# Patient Record
Sex: FEMALE | Employment: OTHER | URBAN - METROPOLITAN AREA
[De-identification: names, ages, dates, MRNs, and addresses within clinical notes are randomized per-mention and may not be internally consistent; named-entity substitution may affect disease eponyms.]

---

## 2017-01-02 ENCOUNTER — OFFICE VISIT (OUTPATIENT)
Dept: BEHAVIORAL HEALTH | Facility: CLINIC | Age: 57
End: 2017-01-02
Payer: MEDICARE

## 2017-01-02 ENCOUNTER — OFFICE VISIT (OUTPATIENT)
Dept: FAMILY MEDICINE | Facility: CLINIC | Age: 57
End: 2017-01-02
Payer: MEDICARE

## 2017-01-02 VITALS
RESPIRATION RATE: 14 BRPM | OXYGEN SATURATION: 95 % | HEART RATE: 76 BPM | WEIGHT: 219 LBS | SYSTOLIC BLOOD PRESSURE: 134 MMHG | TEMPERATURE: 98.8 F | DIASTOLIC BLOOD PRESSURE: 82 MMHG | BODY MASS INDEX: 43 KG/M2 | HEIGHT: 60 IN

## 2017-01-02 DIAGNOSIS — F25.9 SCHIZOAFFECTIVE DISORDER (H): Primary | ICD-10-CM

## 2017-01-02 DIAGNOSIS — I95.1 ORTHOSTATIC HYPOTENSION: ICD-10-CM

## 2017-01-02 DIAGNOSIS — I10 HTN, GOAL BELOW 140/90: ICD-10-CM

## 2017-01-02 DIAGNOSIS — F33.3 SEVERE EPISODE OF RECURRENT MAJOR DEPRESSIVE DISORDER, WITH PSYCHOTIC FEATURES (H): ICD-10-CM

## 2017-01-02 DIAGNOSIS — F25.1 SCHIZOAFFECTIVE DISORDER, DEPRESSIVE TYPE (H): Primary | ICD-10-CM

## 2017-01-02 PROCEDURE — 99215 OFFICE O/P EST HI 40 MIN: CPT | Performed by: PHYSICIAN ASSISTANT

## 2017-01-02 PROCEDURE — 90832 PSYTX W PT 30 MINUTES: CPT | Performed by: SOCIAL WORKER

## 2017-01-02 ASSESSMENT — ANXIETY QUESTIONNAIRES
2. NOT BEING ABLE TO STOP OR CONTROL WORRYING: NOT AT ALL
5. BEING SO RESTLESS THAT IT IS HARD TO SIT STILL: NOT AT ALL
GAD7 TOTAL SCORE: 0
6. BECOMING EASILY ANNOYED OR IRRITABLE: NOT AT ALL
7. FEELING AFRAID AS IF SOMETHING AWFUL MIGHT HAPPEN: NOT AT ALL
3. WORRYING TOO MUCH ABOUT DIFFERENT THINGS: NOT AT ALL
1. FEELING NERVOUS, ANXIOUS, OR ON EDGE: NOT AT ALL
IF YOU CHECKED OFF ANY PROBLEMS ON THIS QUESTIONNAIRE, HOW DIFFICULT HAVE THESE PROBLEMS MADE IT FOR YOU TO DO YOUR WORK, TAKE CARE OF THINGS AT HOME, OR GET ALONG WITH OTHER PEOPLE: NOT DIFFICULT AT ALL

## 2017-01-02 ASSESSMENT — PATIENT HEALTH QUESTIONNAIRE - PHQ9: 5. POOR APPETITE OR OVEREATING: NOT AT ALL

## 2017-01-02 NOTE — PROGRESS NOTES
"  SUBJECTIVE:                                                    Nena Tang is a 56 year old female who presents to clinic today for the following health issues:    She is accompanied today by her foster home staff member, [facility] Follow-up Visit:    Hospital/Nursing Home/IP Rehab Facility: [facility]  Date of Admission: 12/28/2016  Date of Discharge: 12/30/2016  Reason(s) for Admission: Nausea, Schizoaffective disorder            Problems taking medications regularly:  None       Medication changes since discharge: None       Problems adhering to non-medication therapy:  None  Summary of hospitalization:  [employer] discharge summary reviewed.  Of note, patient's metroprolol was decreased from 125 mg to 100 mg and imdur was discontinued.  This was not noted on the summary.    Diagnostic Tests/Treatments reviewed.  Follow up needed: Psychiatry - 1/4/17 at [facility]  Other Healthcare Providers Involved in Patient s Care:         None  Update since discharge: improved.  She notes feeling more \"happy\" and less \"anxious\" since being in the hospital.  She denies any auditory hallucinations.  Also denies any problems with sleep.   She denies any suicidal ideations.  Spent New Years with her son   She denies any etoh use.  Also denies any cravings    She notes an improvement in her dizziness since the blood pressure med changes were made during her hospital stay     Post Discharge Medication Reconciliation: discharge medications reconciled, continue medications without change.  Plan of care communicated with patient and caregiver     Coding guidelines for this visit:  Type of Medical   Decision Making Face-to-Face Visit       within 7 Days of discharge Face-to-Face Visit        within 14 days of discharge   Moderate Complexity [id number]   High Complexity [id number]          The following is from a discharge summary from her recent " hospitalization:    Dizziness: Started less than one week ago. Intermittent bilateral blurred vision. Unclear if patient is noting pre or near-syncopal events. No syncopal events. Is not orthostatic. Dizziness comes and goes, worsened with head movement while in bed today. Denies ear fullness or pressure. No headache, vomiting, double vision, weakness, slurred speech. Does have cardiac history but denies dyspnea, chest pain, palpitations. Increased dizziness with lying on right side. Ddx includes vertigo (most likely given HPI) vs psychiatric vs cardiac (less likely given symptoms although should consider given h/o CAD) vs viral (less likely as no s/s of acute infection) vs medication-induced vs vasovagal (given near-syncope, although does not usually get worse with lying on side). Much less likely central origin given symptoms and lack of neurologic findings.    - EKG today  - prn Zofran    - Scopolamine patch  - Contact medicine if patient develops focal neurological signs, chest pain, dyspnea, palpitations, confusion, AMS, worsening dizziness, syncope or near-syncopal events, or other signs or symptoms of concern  ** Addendum: EKG without acute changes. Borderline Qtc. Likely that dizziness 2/2 vertigo vs psychiatric vs vasovagal  - Would use caution in medications that prolongate QTc    DM2. Most recent A1c 9.7 11/11/2016. PTA on Glipizide 5 mg daily, Lantus 45 units qhs, and Novolog 20 units tid. Glucose 90s-240s since presentation.  - Continue PTA meds and monitoring     HTN/CAD/Hx of Takotsubo cardiomyopathy/HLD. S/p CTA 5/2014 w/ 2 vessel nonobstructive disease (LAD, RCA). Last Echo 5/2014 with EF 55-60%. Last LDL and TC 7/2015 were normal. BPs stable since presentation.    - Continue home regimen of Atorvostatin, Losartan, Metoprolol, Imdur, and ASA.    CINDY. Has not been using her CPAP at home.    - Respiratory therapy consult to arrange CPAP with home settings    Chronic pain. Pain in the right ribs 2/2  fall on ice and possible fracture. PTA on Tylenol, ibuprofen, gabapentin  - Continue PTA meds     Anemia: Hgb 10.4 which is at BL. Normal MCV. Likely 2/2 dietary factors, although could be related to anemia chronic disease.  - Add on B12, ferritin, iron studies, ferritin   ** Addendum: Ferritin, iron studies normal. B12 and Folate pending.       Problem list and histories reviewed & adjusted, as indicated.  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)     Illiterate     Irritable bowel syndrome     overweight - BMI >35     Takotsubo cardiomyopathy     CAD (coronary artery disease)     Restless legs syndrome (RLS)     CINDY (obstructive sleep apnea)- mild AHI 10.3     Verbal auditory hallucination     Chronic low back pain     Schizoaffective disorder, depressive type (H)     Migraine headache     HTN, goal below 140/90     Health Care Home     Lumbago     Cervicalgia     Cocaine abuse, episodic     Suicidal ideation     Esophageal reflux     Mild nonproliferative diabetic retinopathy (H)     Tardive dyskinesia     Alcohol use     Left cataract     Falls frequently     History of uterine cancer     Schizoaffective disorder (H)     Depression     Past Surgical History   Procedure Laterality Date     Hysterectomy  1983     uterine cancer yearly pap's per provider.     Coronary cta  5/21/2014     Release trigger finger  10/11/2012     Left thumb. Procedure: RELEASE TRIGGER FINGER;  LEFT THUMB TRIGGER RELEASE;  Surgeon: Tay Langley MD;  Location: [facility] oophorectormy for sho, w/bx  1983     UTERINE     Laparoscopic cholecystectomy  2008     Release trigger finger Right 12/26/2016     Procedure: RELEASE TRIGGER FINGER;  Surgeon: Albino Castañeda MD;   Location:  OR       Social History   Substance Use Topics     Smoking status: Never Smoker      Smokeless tobacco: Never Used     Alcohol Use: Yes      Comment: 2-3 times a week     Family History   Problem Relation Age of Onset     CANCER Mother      BLADDER     Respiratory Mother      COPD     GASTROINTESTINAL DISEASE Mother      CIRRHOSIS OF LI BOLIVAR     Alcohol/Drug Mother      DIABETES Mother      Hypertension Mother      Lipids Mother      C.A.D. Mother      Glaucoma Mother      Alcohol/Drug Sister      MENTAL ILLNESS Sister      Alcohol/Drug Sister      Psychotic Disorder Sister      CANCER Maternal Grandmother      UNKNOWN TYPE     CANCER Brother      COLON     Cancer - colorectal Brother      IN HIS LATE 30S     Alcohol/Drug Brother       OF HEROIN OVERDOSE AT AGE 22 YRS     Macular Degeneration No family hx of          Current Outpatient Prescriptions   Medication Sig Dispense Refill     metoprolol (TOPROL-XL) 100 MG 24 hr tablet Take 1 tablet (100 mg) by mouth daily 30 tablet 5     insulin aspart (NOVOLOG PEN) 100 UNIT/ML soln Inject 20 Units Subcutaneous 3 times daily (with meals) Inject an extra 7 units once daily for blood sugars over 500mg/dL. Call the clinic if recheck is over 500mg/dL. 1 Month 1     acetaminophen (TYLENOL) 500 MG tablet Take 2 tablets (1,000 mg) by mouth every 6 hours as needed for pain 100 tablet 0     ibuprofen (ADVIL,MOTRIN) 600 MG tablet Take 1 tablet (600 mg) by mouth every 4 hours as needed for moderate pain 60 tablet 0     melatonin 5 MG CAPS Take 1 capsule by mouth daily At dinnertime 90 capsule 1     glucose 4 G CHEW Take 2 every 15 minutes for blood sugar <70mg/dL. Recheck blood sugar every 15 minutes until above 70mg/dL, then eat a substantial meal. 20 tablet 1     order for DME Hold Novolog if meals are refused. 1 each 0     insulin glargine (LANTUS) 100 UNIT/ML PEN Inject 45 Units Subcutaneous At Bedtime 12 mL 1     insulin pen needle 31G X 6 MM Use 4 daily or as  directed. 100 each prn     blood glucose monitoring (ONE TOUCH ULTRA) test strip Check blood sugar 2 times a day as directed, One Touch Ultra Blue Test Strips Please match with Pt's Insurance and meter. 1 Box 11     blood glucose monitoring (ONE TOUCH DELICA) lancets Use to test blood sugar 2 times daily or as directed.  Ok to substitute alternative if insurance prefers. 1 Box prn     citalopram (CELEXA) 20 MG tablet Take 1 tablet (20 mg) by mouth daily 30 tablet 2     cholecalciferol (VITAMIN D3) 80814 UNITS capsule Take 1 capsule (50,000 Units) by mouth once a week FOR LOW VITAMIN D 7 capsule 0     glipiZIDE (GLIPIZIDE XL) 5 MG 24 hr tablet Take 1 tablet (5 mg) by mouth daily 30 tablet 5     atorvastatin (LIPITOR) 80 MG tablet Take 1 tablet (80 mg) by mouth daily 30 tablet 2     gabapentin (NEURONTIN) 300 MG capsule Take 2 capsules (600 mg) by mouth 3 times daily (Patient taking differently: Take 600 mg by mouth 2 times daily ) 180 capsule 2     haloperidol (HALDOL) 5 MG tablet Take 1 tablet (5 mg) by mouth At Bedtime 30 tablet 2     omeprazole (PRILOSEC) 40 MG capsule Take 1 capsule (40 mg) by mouth daily 30 capsule 5     benztropine (COGENTIN) 1 MG tablet Take 1 tablet (1 mg) by mouth 2 times daily 60 tablet 3     order for DME Equipment being ordered: Rolling walker 1 Device 0     losartan (COZAAR) 100 MG tablet Take 1 tablet (100 mg) by mouth daily 30 tablet 2     senna-docusate (SENOKOT-S;PERICOLACE) 8.6-50 MG per tablet Take 1 tablet by mouth 2 times daily as needed for constipation 100 tablet 1     aspirin (ASPIRIN LOW DOSE) 81 MG EC tablet Take 1 tablet (81 mg) by mouth daily 100 tablet 4     Allergies   Allergen Reactions     Imidazole Antifungals Hives     Tolerates diflucan     Ketoprofen Itching     Pruritis to topical     Lisinopril Hives     Metformin Other (See Comments)     Patient hospitalized for lactic acidosis - admitting provider suspectd caused by metformin     Metronidazole Hives     BP  Readings from Last 3 Encounters:   01/02/17 134/82   12/30/16 162/81   12/28/16 116/63    Wt Readings from Last 3 Encounters:   01/02/17 219 lb (99.338 kg)   12/29/16 219 lb 3.2 oz (99.428 kg)   12/28/16 220 lb (99.791 kg)                  Labs reviewed in EPIC    ROS:  Constitutional, HEENT, cardiovascular, pulmonary, GI, , musculoskeletal, neuro, skin, endocrine and psych systems are negative, except as otherwise noted.    OBJECTIVE:                                                    /82 mmHg  Pulse 76  Temp(Src) 98.8  F (37.1  C) (Oral)  Resp 14  Ht 5' (1.524 m)  Wt 219 lb (99.338 kg)  BMI 42.77 kg/m2  SpO2 95%  LMP 01/06/2015  Body mass index is 42.77 kg/(m^2).  GENERAL APPEARANCE: healthy, alert and no distress.  AOx3.  Dressed and groomed casually.  Pleasant female.    MS: extremities normal- no gross deformities noted.  Steady gait - ambulates with assistance from a 4 pronged cane.  FROM of all extremities.    SKIN: no suspicious lesions or rashes  Psychiatric  Appearance:  adequately groomed  Attitude:  cooperative  Eye Contact:  better  Mood:  better  Affect:  appropriate and in normal range  Speech:  clear, coherent  Psychomotor Behavior:  no evidence of tardive dyskinesia, dystonia, or tics  Thought Process:  logical  Associations:  no loose associations  Thought Content:  no evidence of suicidal ideation or homicidal ideation  Insight:  good  Judgment:  intact  Oriented to:  time, person, and place  Attention Span and Concentration:  intact  Recent and Remote Memory:  intact  Fund of Knowledge: appropriate  Muscle Strength and Tone: normal      Diagnostic Test Results:  Results for orders placed or performed during the hospital encounter of 12/28/16   Drug abuse screen 6 urine (tox)   Result Value Ref Range    Amphetamine Qual Urine  NEG     Negative   Cutoff for a negative amphetamine is 500 ng/mL or less.      Barbiturates Qual Urine  NEG     Negative   Cutoff for a negative barbiturate  is 200 ng/mL or less.      Benzodiazepine Qual Urine (A) NEG     Positive   Cutoff for a positive benzodiazepine is greater than 200 ng/mL. This is an   unconfirmed screening result to be used for medical purposes only.      Cannabinoids Qual Urine  NEG     Negative   Cutoff for a negative cannabinoid is 50 ng/mL or less.      Cocaine Qual Urine  NEG     Negative   Cutoff for a negative cocaine is 300 ng/mL or less.      Ethanol Qual Urine  NEG     Negative   Cutoff for a negative urine ethanol is 0.05 g/dL or less      Opiates Qualitative Urine (A) NEG     Positive   Cutoff for a positive opiate is greater than 300 ng/mL. This is an unconfirmed   screening result to be used for medical purposes only.     Glucose by meter   Result Value Ref Range    Glucose 122 (H) 70 - 99 mg/dL   Glucose by meter   Result Value Ref Range    Glucose 97 70 - 99 mg/dL   Glucose by meter   Result Value Ref Range    Glucose 246 (H) 70 - 99 mg/dL   Glucose by meter   Result Value Ref Range    Glucose 191 (H) 70 - 99 mg/dL   Glucose by meter   Result Value Ref Range    Glucose 137 (H) 70 - 99 mg/dL   CBC with platelets   Result Value Ref Range    WBC 5.6 4.0 - 11.0 10e9/L    RBC Count 3.48 (L) 3.8 - 5.2 10e12/L    Hemoglobin 10.4 (L) 11.7 - 15.7 g/dL    Hematocrit 32.7 (L) 35.0 - 47.0 %    MCV 94 78 - 100 fl    MCH 29.9 26.5 - 33.0 pg    MCHC 31.8 31.5 - 36.5 g/dL    RDW 13.0 10.0 - 15.0 %    Platelet Count 204 150 - 450 10e9/L   Comprehensive metabolic panel   Result Value Ref Range    Sodium 139 133 - 144 mmol/L    Potassium 4.2 3.4 - 5.3 mmol/L    Chloride 106 94 - 109 mmol/L    Carbon Dioxide 27 20 - 32 mmol/L    Anion Gap 6 3 - 14 mmol/L    Glucose 168 (H) 70 - 99 mg/dL    Urea Nitrogen 19 7 - 30 mg/dL    Creatinine 0.72 0.52 - 1.04 mg/dL    GFR Estimate 83 >60 mL/min/1.7m2    GFR Estimate If Black >90   GFR Calc   >60 mL/min/1.7m2    Calcium 8.9 8.5 - 10.1 mg/dL    Bilirubin Total 0.3 0.2 - 1.3 mg/dL    Albumin 3.2  (L) 3.4 - 5.0 g/dL    Protein Total 7.1 6.8 - 8.8 g/dL    Alkaline Phosphatase 104 40 - 150 U/L    ALT 26 0 - 50 U/L    AST 17 0 - 45 U/L   TSH with free T4 reflex   Result Value Ref Range    TSH 2.24 0.40 - 4.00 mU/L   Folate   Result Value Ref Range    Folate 10.8 >5.4 ng/mL   Iron and iron binding capacity   Result Value Ref Range    Iron 66 35 - 180 ug/dL    Iron Binding Cap 319 240 - 430 ug/dL    Iron Saturation Index 21 15 - 46 %   Ferritin   Result Value Ref Range    Ferritin 201 8 - 252 ng/mL   Glucose by meter   Result Value Ref Range    Glucose 142 (H) 70 - 99 mg/dL   Glucose by meter   Result Value Ref Range    Glucose 156 (H) 70 - 99 mg/dL   Glucose by meter   Result Value Ref Range    Glucose 145 (H) 70 - 99 mg/dL   Glucose by meter   Result Value Ref Range    Glucose 119 (H) 70 - 99 mg/dL   Glucose by meter   Result Value Ref Range    Glucose 89 70 - 99 mg/dL   EKG 12-lead, complete   Result Value Ref Range    Interpretation ECG Click View Image link to view waveform and result    Internal Medicine Hospitalist Adult IP Consult - [facility]: Patient to be seen: Routine within 24 hrs; Call back #: [phone]; admission, DM type 2, sleep apnea with CPAP- needs CPAP and settings assigned; Consultant may enter orders: Yes    Narrative    Usha Guy PA-C     12/29/2016  1:25 PM    [facility]  Internal Medicine Consult     Nena Tang MRN# [id number]   Age: 56 year old YOB: 1960     Date of Admission: 12/28/2016  Date of Consult:  12/29/2016    Primary Care Provider: Jud Real    Requesting Service: Psychiatry  Reason for Consult: General Medical Evaluation      SUBJECTIVE   CC:   H/o CAD, HTN, DMII   HPI:   Nena Tang is a 56 year old female with a past medical   history of schizoaffective disorder, intermittent substance   abuse, CAD, HTN, IBS, HLD, CINDY, chronic pain, Takotsubo   cardiomyopathy, and DM2 who was admitted to station 3B  with   suicidal ideation. Medicine was consulted in regards to her   complicated medical history.    Per notes, patient has been having worsening depression and is   hearing voices telling her to hurt herself. Patient has been   living in foster care for the past few months. Per   a   few years ago. Reported the holiday season is tough because no   one comes to visit her. Last hospitalization for mental health   10/2016.    Patient reports recent surgery of the right hand for trigger   finger release on . Pain is stable but she has been using   Norco as prescribed. Unsure when she is supposed to follow up   with orthopedics OP. Patient reports good diabetes control. The   manager of her group home does a good job helping her monitor   glucose and stay compliant with meds.     Patient reports intermittent dizziness with some bilateral visual   blurring over the past week. Unclear if patient is noting pre or   near-syncopal events. No syncopal events. No ear fullness or ear   pain. Never had events like the prior. Feels mildly dizzy right   now in bed. Denies medication changes or non-compliance. No   facial droop, difficulty speaking, confusion, AMS, unilateral   weakness. Reports history of MI a few years ago but does not   remember details. Is compliant with PTA meds and reports good   blood pressures. Patient has CINDY and recently found her CPAP. Has   follow up with sleep study OP coming up    Denies recent illness, fever, headache, confusion, acute visual   changes, dizziness, chest pain, dyspnea, cough, abdominal pain,   N/V, urinary symptoms, change in bowels, peripheral edema, new   onset joint pain, or rash       Past Medical History:     Past Medical History   Diagnosis Date     Irritable bowel syndrome      Obesity      Uterine cancer (H)      Type 2 diabetes mellitus (H) 2011     Illiterate 2011     Hyperlipidemia LDL goal <100 10/31/2010     Moderate major depression (H)  6/8/2011     Noncompliance with medication regimen 6/8/2011     Osteopenia 10/7/2009     Vitamin B12 deficiency (non anaemic) 11/23/2009     Vitamin D deficiency 1/29/2009     Schizoaffective disorder (H) 9/13/2011     Fecal urgency 3/8/2012     Migraine 4/19/2012     Verbal auditory hallucination 10/4/2012     CINDY (obstructive sleep apnea) 3/8/2012     uses CPAP     Chronic low back pain 1/22/2013     Schizoaffective disorder, depressive type (H) 2/25/2013     Migraine headache 4/22/2013     Hypertension 7/29/2013     Suicidal intent 10/2/2013     Cocaine abuse, in remission      History of heroin abuse      Takotsubo cardiomyopathy      CAD (coronary artery disease)      5/2014 cath, nonbostructive stenosis to LAD, RCA.        Reviewed and updated in [facility].     Past Surgical History:      Past Surgical History   Procedure Laterality Date     Hysterectomy  1983     uterine cancer yearly pap's per provider.     Coronary cta  5/21/2014     Release trigger finger  10/11/2012     Left thumb. Procedure: RELEASE TRIGGER FINGER;  LEFT THUMB   TRIGGER RELEASE;  Surgeon: Tay Langley MD;  Location: [facility]ophorectormy for sho, w/bx  1983     UTERINE     Laparoscopic cholecystectomy  2008     Release trigger finger Right 12/26/2016     Procedure: RELEASE TRIGGER FINGER;  Surgeon: Albino Castañeda MD;  Location:  OR         Social History:   Tobacco use: denies  Alcohol use: denies  Elicit drug use: denies    Reviewed and updated in Epic.     Family History:     Family History   Problem Relation Age of Onset     CANCER Mother      BLADDER     Respiratory Mother      COPD     GASTROINTESTINAL DISEASE Mother      CIRRHOSIS OF LI BOLIVAR     Alcohol/Drug Mother      DIABETES Mother      Hypertension Mother      Lipids Mother      C.A.D. Mother      Glaucoma Mother      Alcohol/Drug Sister      MENTAL ILLNESS Sister      Alcohol/Drug Sister      Psychotic Disorder Sister      CANCER Maternal Grandmother       UNKNOWN TYPE     CANCER Brother      COLON     Cancer - colorectal Brother      IN HIS LATE 30S     Alcohol/Drug Brother       OF HEROIN OVERDOSE AT AGE 22 YRS     Macular Degeneration No family hx of          Allergies:     Allergies   Allergen Reactions     Imidazole Antifungals Hives     Tolerates diflucan     Ketoprofen Itching     Pruritis to topical     Lisinopril Hives     Metformin Other (See Comments)     Patient hospitalized for lactic acidosis - admitting provider   suspectd caused by metformin     Metronidazole Hives         Medications:   Reviewed. Please see MAR     Review of Systems:   10 point ROS of systems including Constitutional, Eyes,   Respiratory, Cardiovascular, Gastroenterology, Genitourinary,   Integumentary, Muscularskeletal, Psychiatric were all negative   except for pertinent positives noted in my HPI.    OBJECTIVE   Physical Exam:   Vitals were reviewed  Blood pressure 111/62, pulse 66, temperature 97.7  F (36.5  C),   temperature source Oral, resp. rate 16, height 1.524 m (5'),   weight 99.428 kg (219 lb 3.2 oz), last menstrual period   2015, SpO2 94 %.  General: Alert, NAD, pleasant  HEENT: NCAT, anicteric sclera, EOMI, mucous membranes pink and   moist. Increased dizziness with turning on right side  Neck: Supple, no lymphadenopathy or thyromegaly  Cardiovascular: RRR, S1S2  Lungs: CTAB  Abdomen: + BS, soft without distention and no tenderness   Vascular: no peripheral edema, distal pulses palpable  Neurologic: AAO X 3, no focal deficits  Neuropsychiatric: Per psych  Skin: No jaundice, rashes, or lesions        Data:        NA      141   2016 CHLORIDE      107   2016 BUN         13   2016  BUN     13.5   2009   POTASSIUM      4.6   2016 CO2       26   2016 CR       0.67   2016     Lab Results   Component Value Date    WBC 6.3 2016    HGB 10.2* 2016    HCT 31.5* 2016    MCV 96 2016     2016     Lab  Results   Component Value Date    WBC 6.3 11/11/2016         Assessment and Plan/Recommendations:   Nena Tang is a 56 year old female with a past medical   history of schizoaffective disorder, intermittent substance   abuse, CAD, HTN, IBS, HLD, CINDY, chronic pain, Takotsubo   cardiomyopathy, and DM2 who was admitted to station 3B with   suicidal ideation. Medicine was consulted in regards to her   complicated medical history.    Schizoaffective Disorder, depression, and SI. Hearing voices   telling her to hurt herself.  - Management per primary team    S/p A1 pulley release of the right thumb: Performed 12/26/2016 by   Dr. Castañeda for trigger finger. Patient discharged same day.   Prescribed Norco for pain  - Continue Nroco, end date scheduled for Monday  - Would NOT prescribe Norco on discharge  - Prn Senakot    Dizziness: Started less than one week ago. Intermittent bilateral   blurred vision. Unclear if patient is noting pre or near-syncopal   events. No syncopal events. Is not orthostatic. Dizziness comes   and goes, worsened with head movement while in bed today. Denies   ear fullness or pressure. No headache, vomiting, double vision,   weakness, slurred speech. Does have cardiac history but denies   dyspnea, chest pain, palpitations. Increased dizziness with lying   on right side. Ddx includes vertigo (most likely given HPI) vs   psychiatric vs cardiac (less likely given symptoms although   should consider given h/o CAD) vs viral (less likely as no s/s of   acute infection) vs medication-induced vs vasovagal (given   near-syncope, although does not usually get worse with lying on   side). Much less likely central origin given symptoms and lack of   neurologic findings.   - EKG today  - prn Zofran   - Scopolamine patch  - Contact medicine if patient develops focal neurological signs,   chest pain, dyspnea, palpitations, confusion, AMS, worsening   dizziness, syncope or near-syncopal events, or other signs  or   symptoms of concern  ** Addendum: EKG without acute changes. Borderline Qtc. Likely   that dizziness 2/2 vertigo vs psychiatric vs vasovagal  - Would use caution in medications that prolongate QTc    DM2. Most recent A1c 9.7 11/11/2016. PTA on Glipizide 5 mg daily,   Lantus 45 units qhs, and Novolog 20 units tid. Glucose 90s-240s   since presentation.  - Continue PTA meds and monitoring     HTN/CAD/Hx of Takotsubo cardiomyopathy/HLD. S/p CTA 5/2014 w/ 2   vessel nonobstructive disease (LAD, RCA). Last Echo 5/2014 with   EF 55-60%. Last LDL and TC 7/2015 were normal. BPs stable since   presentation.   - Continue home regimen of Atorvostatin, Losartan, Metoprolol,   Imdur, and ASA.    CINDY. Has not been using her CPAP at home.   - Respiratory therapy consult to arrange CPAP with home settings    Chronic pain. Pain in the right ribs 2/2 fall on ice and possible   fracture. PTA on Tylenol, ibuprofen, gabapentin  - Continue PTA meds     Anemia: Hgb 10.4 which is at BL. Normal MCV. Likely 2/2 dietary   factors, although could be related to anemia chronic disease.  - Add on B12, ferritin, iron studies, ferritin   ** Addendum: Ferritin, iron studies normal. B12 and Folate   pending.     Medicine will follow up on labs and EKG. Thank you for the   opportunity to be a part of this patient's care.      Usha Guy  Internal Medicine [facility] Hospitalist  [phone]  December 29, 2016             *Note: Due to a large number of results and/or encounters for the requested time period, some results have not been displayed. A complete set of results can be found in Results Review.        ASSESSMENT/PLAN:                                                    (F25.1) Schizoaffective disorder, depressive type (H)  (primary encounter diagnosis)  Comment: ongoing  Plan: s/p hospitalization for worsening of mood  She reports a significant improvement in her mood since discharge   She met with YORDY EASTON today - see  notes  She will be establishing with a new psychiatrist at [facility] later this week   She will continue to see her outsider therapist as scheduled  Also strongly encouraged to continue with day treatment   Follow up in one month to establish with Vandana Campo since I will be leaving the clinic    (I10) HTN, goal below 140/90  Comment: ongoing  BP Readings from Last 3 Encounters:   01/02/17 134/82   12/30/16 162/81   12/28/16 116/63     Plan: during her recent hospital stay, metoprolol was decreased from 125 mg to 100 mg and imdur was discontinued due to dizziness   Orthostatics done today consistent with orthostatic blood pressure   Advised patient to follow up with [facility]TM as scheduled in ~2 weeks.  If blood pressure continues to be stable, may consider decreasing metoprolol further     (I95.1) Orthostatic hypotension  Comment: new  Plan: see above  Handout given on this  Encouraged increased fluid intake      Patient Instructions   The following changes were made at today's visit:    Meds: no changes in medications     Follow up: in one month with Vandana Campo and Richardson Lopez         Orthostatic Low Blood Pressure (Hypotension)  A blood pressure reading is made up of 2 numbers There is a top number over a bottom number. The top number is the systolic pressure. The bottom number is the diastolic pressure. A normal blood pressure is less than 120 over less than 80. Low blood pressure (hypotension) is a blood pressure that is less than what is normal for you.  Orthostatic hypotension is a type of low blood pressure that occurs only when you change position from lying to standing. It can cause dizziness, lightheadedness, or fainting.  Medicines can cause orthostatic hypotension. These include:    High blood pressure medicines    Water pills (diuretics)    Some heart medicines    Some antidepressants    Pain, anxiety, sedative, and sleeping medicines  Other causes include:    Dehydration from vomiting,  diarrhea, or not getting enough fluids    Severe infection    High fever    Blood loss. This could be bleeding from the stomach or intestines.  Treatment will depend on what is causing your low blood pressure.  Home care  Follow these guidelines when caring for yourself at home:    Rest until your symptoms get better.    Change positions slowly from lying to standing. When getting out of bed, sit on the side of the bed with your legs down for at least 30 seconds before standing. This gives your body time to adjust to the position change.    Follow the treatment plan described by your health care provider.  Follow-up care  Follow up with your health care provider, or as advised.  When to seek medical advice  Call your health care provider right away if any of these occur:    Dizziness, lightheadedness, or fainting    Black or red color in your stools or vomit    Diarrhea or vomiting that doesn t go away    You aren t able to eat or drink    Fever of 100.4 F (38 C) or higher, or as directed by your health care provider    Burning when you urinate    Foul-smelling urine    2176-6818 [employer]. [address] [place] [address]. All rights reserved. This information is not intended as a substitute for professional medical care. Always follow your healthcare professional's instructions.              Jud King PA-C  [facility]  40 min spent in direct face to face time with this pt, greater than 50% in counseling on the issues addressed above and coordination of care.

## 2017-01-02 NOTE — NURSING NOTE
Chief Complaint   Patient presents with     Hospital F/U       Initial /82 mmHg  Pulse 76  Temp(Src) 98.8  F (37.1  C) (Oral)  Resp 14  Ht 5' (1.524 m)  Wt 219 lb (99.338 kg)  BMI 42.77 kg/m2  SpO2 95%  LMP 01/06/2015 Estimated body mass index is 42.77 kg/(m^2) as calculated from the following:    Height as of this encounter: 5' (1.524 m).    Weight as of this encounter: 219 lb (99.338 kg).  BP completed using cuff size: large(lt)    Haley Shelby MA

## 2017-01-02 NOTE — MR AVS SNAPSHOT
After Visit Summary   1/2/2017    Nena Tang    MRN: [id number]           Patient Information     Date Of Birth          1960        Visit Information        Provider Department      1/2/2017 2:00 PM Jud Real PA-C [facility]        Today's Diagnoses     Orthostatic hypotension    -  1       Care Instructions    The following changes were made at today's visit:    Meds: no changes in medications     Follow up: in one month with Vandana Campo and Richardson Lopez         Orthostatic Low Blood Pressure (Hypotension)  A blood pressure reading is made up of 2 numbers There is a top number over a bottom number. The top number is the systolic pressure. The bottom number is the diastolic pressure. A normal blood pressure is less than 120 over less than 80. Low blood pressure (hypotension) is a blood pressure that is less than what is normal for you.  Orthostatic hypotension is a type of low blood pressure that occurs only when you change position from lying to standing. It can cause dizziness, lightheadedness, or fainting.  Medicines can cause orthostatic hypotension. These include:    High blood pressure medicines    Water pills (diuretics)    Some heart medicines    Some antidepressants    Pain, anxiety, sedative, and sleeping medicines  Other causes include:    Dehydration from vomiting, diarrhea, or not getting enough fluids    Severe infection    High fever    Blood loss. This could be bleeding from the stomach or intestines.  Treatment will depend on what is causing your low blood pressure.  Home care  Follow these guidelines when caring for yourself at home:    Rest until your symptoms get better.    Change positions slowly from lying to standing. When getting out of bed, sit on the side of the bed with your legs down for at least 30 seconds before standing. This gives your body time to adjust to the position change.    Follow the treatment plan described  by your health care provider.  Follow-up care  Follow up with your health care provider, or as advised.  When to seek medical advice  Call your health care provider right away if any of these occur:    Dizziness, lightheadedness, or fainting    Black or red color in your stools or vomit    Diarrhea or vomiting that doesn t go away    You aren t able to eat or drink    Fever of 100.4 F (38 C) or higher, or as directed by your health care provider    Burning when you urinate    Foul-smelling urine    6870-1062 [employer]. [address] [place] [address]. All rights reserved. This information is not intended as a substitute for professional medical care. Always follow your healthcare professional's instructions.              Follow-ups after your visit        Your next 10 appointments already scheduled     Jan 03, 2017  1:00 PM   Treatment with ADULT MH DAY 4C   [facility] ([facility])    [address] [place] [address]   [phone]            Jan 05, 2017  9:30 AM   Return Visit with Albino Castañeda MD   [facility] ORTHOPEDIC SURGERY ([facility])    [address]  [address]  [facility] [address]   [phone]            Jan 05, 2017  1:00 PM   Treatment with ADULT MH DAY 4C   [facility] ([facility])    [address] [place] [address]   [phone]            Jan 09, 2017  1:00 PM   Treatment with ADULT MH DAY 4C   [facility] ([facility])    [facility] [address] [address]   [phone]            Dilip 10, 2017  1:00 PM   Treatment with ADULT MH DAY 4C   [facility] ([facility])    [facility] [address] [address]   [phone]             Dilip 10, 2017  8:30 PM   PSG Split with BED 4 SH SLEEP   [facility] ([facility])    [address]  [address]  [place] [address]   [phone]            Jan 11, 2017 12:30 PM   SHORT with Kenzie Sheehan   [facility] ([facility])    [address] [place] [address] [address]  [facility] [address] [phone]            Jan 12, 2017  1:00 PM   Treatment with ADULT MH DAY 4C   [facility] ([facility])    [address] [place] [address]   [phone]            Jan 16, 2017  1:00 PM   Treatment with ADULT MH DAY 4C   [facility] ([facility])    [address] [place] [address]   [phone]            Jan 17, 2017  1:00 PM   Treatment with ADULT MH DAY 4C   [facility] ([facility])    [address] [place] [address] [phone]              Who to contact     If you have questions or need follow up information about today's clinic visit or your schedule please contact [facility] PRIMARY [facility] directly at [phone].  Normal or non-critical lab and imaging results will be communicated to you by MyChart, letter or phone within 4 business days after the clinic has received the results. If you do not hear from us within 7 days, please contact the clinic through [employer] or phone. If you have a critical or abnormal lab result, we will notify you by phone as soon as possible.  Submit refill requests through [employer] or call your pharmacy and they will forward the refill request to us. Please allow 3 business days for your refill to be completed.          Additional Information About Your Visit        [employer] Information     [employer] lets you send  "messages to your doctor, view your test results, renew your prescriptions, schedule appointments and more. To sign up, go to [email or web address] . Click on \"Log in\" on the left side of the screen, which will take you to the Welcome page. Then click on \"Sign up Now\" on the right side of the page.     You will be asked to enter the access code listed below, as well as some personal information. Please follow the directions to create your username and password.     Your access code is: [email or web address]  Expires: 2017  3:41 PM     Your access code will  in 90 days. If you need help or a new code, please call your [place] clinic or [phone].        Your Vitals Were     Pulse Temperature Respirations Height BMI (Body Mass Index) Pulse Oximetry    76 98.8  F (37.1  C) (Oral) 14 5' (1.524 m) 42.77 kg/m2 95%    Last Period                   2015            Blood Pressure from Last 3 Encounters:   17 134/82   16 162/81   16 116/63    Weight from Last 3 Encounters:   17 219 lb (99.338 kg)   16 219 lb 3.2 oz (99.428 kg)   16 220 lb (99.791 kg)              Today, you had the following     No orders found for display         Today's Medication Changes          These changes are accurate as of: 17  3:06 PM.  If you have any questions, ask your nurse or doctor.               These medicines have changed or have updated prescriptions.        Dose/Directions    gabapentin 300 MG capsule   Commonly known as:  NEURONTIN   This may have changed:  when to take this   Used for:  Type 2 diabetes mellitus with diabetic neuropathy, with long-term current use of insulin (H)        Dose:  600 mg   Take 2 capsules (600 mg) by mouth 3 times daily   Quantity:  180 capsule   Refills:  2                Primary Care Provider Office Phone # Fax #    Jud King PA-C [phone]        [facility] [address] [facility] [address]  [facility] [address]        Goals     "    Patient-Stated    Medical     Goal Comments - Note created  7/7/2016  9:15 AM by Chelsea Pulido RN    Get blood sugars under control    Improve medication compliance    As of today's date 7/7/2016 goal is met at 0 - 25%.   Goal Status:  Active          Thank you!     Thank you for choosing [facility]  for your care. Our goal is always to provide you with excellent care. Hearing back from our patients is one way we can continue to improve our services. Please take a few minutes to complete the written survey that you may receive in the mail after your visit with us. Thank you!             Your Updated Medication List - Protect others around you: Learn how to safely use, store and throw away your medicines at www.disposemymeds.org.          This list is accurate as of: 1/2/17  3:06 PM.  Always use your most recent med list.                   Brand Name Dispense Instructions for use    acetaminophen 500 MG tablet    TYLENOL    100 tablet    Take 2 tablets (1,000 mg) by mouth every 6 hours as needed for pain       aspirin 81 MG EC tablet    ASPIRIN LOW DOSE    100 tablet    Take 1 tablet (81 mg) by mouth daily       atorvastatin 80 MG tablet    LIPITOR    30 tablet    Take 1 tablet (80 mg) by mouth daily       benztropine 1 MG tablet    COGENTIN    60 tablet    Take 1 tablet (1 mg) by mouth 2 times daily       blood glucose monitoring lancets     1 Box    Use to test blood sugar 2 times daily or as directed.  Ok to substitute alternative if insurance prefers.       blood glucose monitoring test strip    ONE TOUCH ULTRA    1 Box    Check blood sugar 2 times a day as directed, One Touch Ultra Blue Test Strips Please match with Pt's Insurance and meter.       cholecalciferol 55868 UNITS capsule    VITAMIN D3    7 capsule    Take 1 capsule (50,000 Units) by mouth once a week FOR LOW VITAMIN D       citalopram 20 MG tablet    celeXA    30 tablet    Take 1 tablet (20 mg) by mouth daily        gabapentin 300 MG capsule    NEURONTIN    180 capsule    Take 2 capsules (600 mg) by mouth 3 times daily       glipiZIDE 5 MG 24 hr tablet    glipiZIDE XL    30 tablet    Take 1 tablet (5 mg) by mouth daily       glucose 4 G Chew chewable tablet     20 tablet    Take 2 every 15 minutes for blood sugar <70mg/dL. Recheck blood sugar every 15 minutes until above 70mg/dL, then eat a substantial meal.       haloperidol 5 MG tablet    HALDOL    30 tablet    Take 1 tablet (5 mg) by mouth At Bedtime       ibuprofen 600 MG tablet    ADVIL/MOTRIN    60 tablet    Take 1 tablet (600 mg) by mouth every 4 hours as needed for moderate pain       insulin aspart 100 UNIT/ML injection    NovoLOG PEN    1 Month    Inject 20 Units Subcutaneous 3 times daily (with meals) Inject an extra 7 units once daily for blood sugars over 500mg/dL. Call the clinic if recheck is over 500mg/dL.       insulin glargine 100 UNIT/ML injection    LANTUS    12 mL    Inject 45 Units Subcutaneous At Bedtime       insulin pen needle 31G X 6 MM     100 each    Use 4 daily or as directed.       losartan 100 MG tablet    COZAAR    30 tablet    Take 1 tablet (100 mg) by mouth daily       melatonin 5 MG Caps     90 capsule    Take 1 capsule by mouth daily At dinnertime       metoprolol 100 MG 24 hr tablet    TOPROL-XL    30 tablet    Take 1 tablet (100 mg) by mouth daily       omeprazole 40 MG capsule    priLOSEC    30 capsule    Take 1 capsule (40 mg) by mouth daily       * order for DME     1 Device    Equipment being ordered: Rolling walker       * order for DME     1 each    Hold Novolog if meals are refused.       senna-docusate 8.6-50 MG per tablet    SENOKOT-S;PERICOLACE    100 tablet    Take 1 tablet by mouth 2 times daily as needed for constipation       * Notice:  This list has 2 medication(s) that are the same as other medications prescribed for you. Read the directions carefully, and ask your doctor or other care provider to review them with you.

## 2017-01-02 NOTE — PATIENT INSTRUCTIONS
The following changes were made at today's visit:    Meds: no changes in medications     Follow up: in one month with Vandana Campo and Richardson Lopez         Orthostatic Low Blood Pressure (Hypotension)  A blood pressure reading is made up of 2 numbers There is a top number over a bottom number. The top number is the systolic pressure. The bottom number is the diastolic pressure. A normal blood pressure is less than 120 over less than 80. Low blood pressure (hypotension) is a blood pressure that is less than what is normal for you.  Orthostatic hypotension is a type of low blood pressure that occurs only when you change position from lying to standing. It can cause dizziness, lightheadedness, or fainting.  Medicines can cause orthostatic hypotension. These include:    High blood pressure medicines    Water pills (diuretics)    Some heart medicines    Some antidepressants    Pain, anxiety, sedative, and sleeping medicines  Other causes include:    Dehydration from vomiting, diarrhea, or not getting enough fluids    Severe infection    High fever    Blood loss. This could be bleeding from the stomach or intestines.  Treatment will depend on what is causing your low blood pressure.  Home care  Follow these guidelines when caring for yourself at home:    Rest until your symptoms get better.    Change positions slowly from lying to standing. When getting out of bed, sit on the side of the bed with your legs down for at least 30 seconds before standing. This gives your body time to adjust to the position change.    Follow the treatment plan described by your health care provider.  Follow-up care  Follow up with your health care provider, or as advised.  When to seek medical advice  Call your health care provider right away if any of these occur:    Dizziness, lightheadedness, or fainting    Black or red color in your stools or vomit    Diarrhea or vomiting that doesn t go away    You aren t able to eat or drink    Fever of  100.4 F (38 C) or higher, or as directed by your health care provider    Burning when you urinate    Foul-smelling urine    3201-1030 [employer]. [address] [place] [address], [place] [address]. All rights reserved. This information is not intended as a substitute for professional medical care. Always follow your healthcare professional's instructions.

## 2017-01-03 ASSESSMENT — PATIENT HEALTH QUESTIONNAIRE - PHQ9: SUM OF ALL RESPONSES TO PHQ QUESTIONS 1-9: 0

## 2017-01-03 ASSESSMENT — ANXIETY QUESTIONNAIRES: GAD7 TOTAL SCORE: 0

## 2017-01-03 NOTE — PROGRESS NOTES
[facility]  January 2, 2017      Behavioral Health Clinician Progress Note    Patient Name: Nena Tang           Service Type: Individual      Service Location:  in clinic      Session Start Time: 2:22pm        Session End Time: 2:40pm     Session Length: 16 - 37      Attendees: Client and foster mother    Visit Activities (Refresh list every visit): [facility] [employer]  Diagnostic Assessment Date: not completed yet  Treatment Plan Review Date: not completed yet  See Flowsheets for today's PHQ-9 and TAMIA-7 results  Previous PHQ-9:   PHQ-9 SCORE 11/7/2016 12/20/2016 1/2/2017   Total Score - - -   Total Score - - -   Total Score 0 7 0     Previous TAMIA-7:   TAMIA-7 SCORE 11/7/2016 12/20/2016 1/2/2017   Total Score - - -   Total Score 0 6 0   Total Score - - -       ALEXANDRA LEVEL:  ALEXANDRA Score (Last Two) 8/30/2011 6/9/2014   ALEXANDRA Raw Score 42 37   Activation Score 66 49.9   ALEXANDRA Level 3 2       DATA  Extended Session (60+ minutes): Yes due to crisis assessment and evaluation   Interactive Complexity: No  Crisis: Yes, visit entailed Crisis Management / Stabilization requiring urgent assessment and history of the crisis state, mental status exam and disposition  -Presenting problem with life threatening or complex issues that required immediate attention to a patient in high distress    Treatment Objective(s) Addressed in This Session:  Target Behavior(s): disease management/lifestyle changes manage physical and mental health symptoms    Depressed Mood: Increase interest, engagement, and pleasure in doing things  Decrease frequency and intensity of feeling down, depressed, hopeless  Improve quantity and quality of night time sleep / decrease daytime naps  Feel less tired and more energy during the day   Identify negative self-talk and behaviors: challenge core beliefs, myths, and actions  Improve concentration, focus, and mindfulness in daily activities   Decrease thoughts that you'd be better  "off dead or of suicide / self-harm  Anxiety: will experience a reduction in anxiety, will develop more effective coping skills to manage anxiety symptoms, will develop healthy cognitive patterns and beliefs and will increase ability to function adaptively  Risk / Safety: will develop strategies for more effective management of risk issues and will follow safety plan (in EMR) for more effective management of risk issues  Alcohol / Substance Use: continue to make healthy choices regarding substance use and engage in activities / supportive services that promote sobriety  Thought Disturbance: will develop skills to more effectively manage symptoms, will develop more effective support systems and will continue to take medications as prescribed    Current Stressors / Issues:  The patient reported that she has been feeling \"good\" after her last visit to the clinic she was taken to the ED and admitted to the acute psychiatric care for about 3 days-she stated that since leaving the hospital she has had more stable mood and mental health symptoms-we spent a short time in the visit to normalize the patient past and future needs from going to ED for \"tune up.\"  She reported that her anxiety has been manageable-she reported no hallucinations and no parania-she reported that a sign that she is getting decompensated is when she has increased hallucinations-her sleep has been good-no suicidal thoughts and she is looking to reconnect with day treatment attendance.      Progress on Treatment Objective(s) / Homework:  Minimal progress - ACTION (Actively working towards change); Intervened by reinforcing change plan / affirming steps taken    Motivational Interviewing    MI Intervention: Expressed Empathy/Understanding, Supported Autonomy, Collaboration, Evocation and Permission to raise concern or advise     Change Talk Expressed by the Patient: Desire to change Ability to change Reasons to change Need to change Activation Taking " steps    Provider Response to Change Talk: E - Evoked more info from patient about behavior change, A - Affirmed patient's thoughts, decisions, or attempts at behavior change and R - Reflected patient's change talk      Care Plan review completed: No    Medication Review:  No changes to current psychiatric medication(s)    Medication Compliance:  Yes    Changes in Health Issues:   None reported    Chemical Use Review:   Substance Use: Chemical use reviewed, no active concerns identified      Tobacco Use: No current tobacco use.      Assessment: Current Emotional / Mental Status (status of significant symptoms):    Risk status (Self / Other harm or suicidal ideation)  Patient denies current fears or concerns for personal safety.  Patient denies current or recent suicidal ideation or behaviors.  Patient denies current or recent homicidal ideation or behaviors.  Patient denies current or recent self injurious behavior or ideation.  Patient denies other safety concerns.  A safety and risk management plan has not been developed at this time, however patient was encouraged to call [place] [phone] should there be a change in any of these risk factors.    Appearance:   Appropriate   Eye Contact:   Good   Psychomotor Behavior: Normal   Attitude:   Cooperative   Orientation:   All  Speech   Rate / Production: Normal    Volume:  Normal   Mood:    Elevated  Normal  Affect:    Appropriate  Bright   Thought Content:  Clear   Thought Form:  Coherent  Goal Directed   Insight:    Poor     Provisional Diagnoses:  1. Schizoaffective disorder (H)    2. Severe episode of recurrent major depressive disorder, with psychotic features (H)        Collateral Reports Completed:  see flowsheets    Plan: (Homework, other):  Patient was given information about behavioral services and encouraged to schedule a follow up appointment with the clinic [facility] in one week.  Plan to have phone call with pt next week and see her at next scheduled visit.   She was also given information about mental health symptoms and treatment options .  CD Recommendations: Maintain Sobriety, access day treatment MI/CD. Richardson Lopez [facility], [facility]

## 2017-01-05 ENCOUNTER — HOSPITAL ENCOUNTER (OUTPATIENT)
Dept: BEHAVIORAL HEALTH | Facility: CLINIC | Age: 57
End: 2017-01-05
Attending: PSYCHOLOGIST
Payer: MEDICARE

## 2017-01-05 ENCOUNTER — OFFICE VISIT (OUTPATIENT)
Dept: ORTHOPEDICS | Facility: CLINIC | Age: 57
End: 2017-01-05
Payer: MEDICARE

## 2017-01-05 DIAGNOSIS — M65.311 TRIGGER THUMB, RIGHT THUMB: Primary | ICD-10-CM

## 2017-01-05 PROCEDURE — H2012 BEHAV HLTH DAY TREAT, PER HR: HCPCS

## 2017-01-05 PROCEDURE — 99024 POSTOP FOLLOW-UP VISIT: CPT | Performed by: ORTHOPAEDIC SURGERY

## 2017-01-05 NOTE — PROGRESS NOTES
Group Therapy Progress Notes     Area of Treatment Focus:  Symptom Management, Personal Safety, Community Resources/Discharge Planning, Abstinence/Relapse Prevention, Develop/Improve Independent Living/Socialization Skills/Interpersonal Relationships and Cultural/Racial/Health and Spiritual    Therapeutic Interventions/Treatment Strategies:  Support, Redirection, Feedback, Limit/Boundaries, Safety Assessments, Structuresd Activity, Problem Solving, Clarification, Education, Motivational Enhancement Therapy, Cognitive Behavioral Therapy and Structured Activity    Response to Treatment Strategies:  Accepted Feedback, Gave Feedback, Listened, Focused on Goals, Attentive, Accepted Support, Alert, Demonstrates Behavior Change    Name of Group:       Progress Note   The client reported being safe today.  The group discussed different areas of their lives, and how they are able to care for themselves, get out in the community, and their feelings for the past few days.     She reported the following:  Sleep:   Not through the night, and reporting nightmares of trauma.  Medication:  Changed when she went into the hospital  Concentration: Terrible, per her report  Appetite:  ok  Interest level and activities: She had surgery on her thumb and was inpatient for command-type hallucinations  Social contacts: Family, she has a Home Health Nurse who worked with her  Feelings:   She reported feeling better, after medication changes from inpatient    Psychosis:  The voices have decreased from the past week, where they told her to kill herself     She reported that her psychiatrist admitted her to inpatient.               Is this a Weekly Review of the Progress on the Treatment Plan?  Yes.      Are Treatment Plan Goals being addressed?  Yes, continue treatment goals      Are Treatment Plan Strategies to Address Goals Effective?  Yes, continue treatment strategies      Are there any current contracts in place?  No

## 2017-01-05 NOTE — PROGRESS NOTES
HISTORY OF PRESENT ILLNESS:    Nena Tang is a 56 year old female who is seen in follow up for right trigger thumb release on 12/26/16.  Present symptoms: no pain, just itching, taking Ibu intermittently.     Sutures were removed and covered with steri-strips.    PHYSICAL EXAM:  LMP 01/06/2015  There is no weight on file to calculate BMI.   GENERAL APPEARANCE: healthy, alert and no distress   SKIN: no suspicious lesions or rashes  NEURO: Normal strength and tone, mentation intact and speech normal  VASCULAR:  good pulses, and cappillary refill   LYMPH: no lymphadenopathy   PSYCH:  mentation appears normal and affect normal/bright    MSK:  Wound healing well, CMS is intact to thethumb tip.    IMAGING INTERPRETATION:       ASSESSMENT / PLAN: 2 weeks status post right trigger thumb release, doing well. She will gradually resume her activities of daily livingand return to see me on an as-needed basis.        Return to clinic del Castañeda MD  Dept. Orthopedic Surgery  [facility]

## 2017-01-06 NOTE — PROGRESS NOTES
Group Therapy Progress Notes     Area of Treatment Focus:  Symptom Management    Therapeutic Interventions/Treatment Strategies:  Support, Feedback, Safety Assessments, Clarification Structured Activity and Education    Response to Treatment Strategies:  Accepted Feedback, Listened, Attentive, Accepted Support and Alert    Name of Group:  Life Skills    Progress Note  Client presented with calm,even mood.Good focus and concentration during a discussion related to self esteem with a focus on healthy risk taking. Thought process clear,goal directed and reality based.      Is this a Weekly Review of the Progress on the Treatment Plan?  Yes.      Are Treatment Plan Goals being addressed?  Yes, continue treatment goals      Are Treatment Plan Strategies to Address Goals Effective?  Yes, continue treatment strategies      Are there any current contracts in place?  No

## 2017-01-09 ENCOUNTER — HOSPITAL ENCOUNTER (OUTPATIENT)
Dept: BEHAVIORAL HEALTH | Facility: CLINIC | Age: 57
End: 2017-01-09
Attending: PSYCHOLOGIST
Payer: MEDICARE

## 2017-01-09 PROCEDURE — H2012 BEHAV HLTH DAY TREAT, PER HR: HCPCS

## 2017-01-09 NOTE — PROGRESS NOTES
Group Therapy Progress Notes     Area of Treatment Focus:  Symptom Management, Personal Safety, Community Resources/Discharge Planning, Abstinence/Relapse Prevention, Develop/Improve Independent Living/Socialization Skills/Interpersonal Relationships and Cultural/Racial/Health and Spiritual    Therapeutic Interventions/Treatment Strategies:  Support, Redirection, Feedback, Limit/Boundaries, Safety Assessments, Structuresd Activity, Problem Solving, Clarification, Education, Motivational Enhancement Therapy, Cognitive Behavioral Therapy and Structured Activity    Response to Treatment Strategies:  Accepted Feedback, Gave Feedback, Listened, Focused on Goals, Attentive, Accepted Support, Alert, Demonstrates Behavior Change    Name of Group:  4C Group Therapy     Progress Note   The client reported being safe today.  The group discussed different areas of their lives, and how they are able to care for themselves, get out in the community, and their feelings for the past few days.       Sleep:   poor  Medication:  As prescribed  Concentration: low  Appetite:  ok  Interest level and activities: She talked with family by phone  Social contacts: House-mates, Foster Care staff, family  Feelings:   Anxious and having panic attacks   Psychosis  Decreased symptoms,                 Is this a Weekly Review of the Progress on the Treatment Plan?  Yes.      Are Treatment Plan Goals being addressed?  Yes, continue treatment goals      Are Treatment Plan Strategies to Address Goals Effective?  Yes, continue treatment strategies      Are there any current contracts in place?  No

## 2017-01-10 ENCOUNTER — HOSPITAL ENCOUNTER (OUTPATIENT)
Dept: BEHAVIORAL HEALTH | Facility: CLINIC | Age: 57
End: 2017-01-10
Attending: PSYCHOLOGIST
Payer: MEDICARE

## 2017-01-10 ENCOUNTER — THERAPY VISIT (OUTPATIENT)
Dept: SLEEP MEDICINE | Facility: CLINIC | Age: 57
End: 2017-01-10
Payer: MEDICARE

## 2017-01-10 DIAGNOSIS — G47.9 SLEEPING DIFFICULTIES: ICD-10-CM

## 2017-01-10 DIAGNOSIS — G47.33 OSA (OBSTRUCTIVE SLEEP APNEA): ICD-10-CM

## 2017-01-10 PROCEDURE — H2012 BEHAV HLTH DAY TREAT, PER HR: HCPCS

## 2017-01-10 PROCEDURE — 95811 POLYSOM 6/>YRS CPAP 4/> PARM: CPT | Performed by: FAMILY MEDICINE

## 2017-01-10 NOTE — PROGRESS NOTES
Group Therapy Progress Notes     Area of Treatment Focus:  Symptom Management, Personal Safety, Community Resources/Discharge Planning, Abstinence/Relapse Prevention, Develop/Improve Independent Living/Socialization Skills/Interpersonal Relationships and Cultural/Racial/Health and Spiritual    Therapeutic Interventions/Treatment Strategies:  Support, Redirection, Feedback, Limit/Boundaries, Safety Assessments, Structured Activity, Problem Solving, Clarification, Education, Motivational Enhancement Therapy, Cognitive Behavioral Therapy and Structured Activity    Response to Treatment Strategies:  Accepted Feedback, Gave Feedback, Listened, Focused on Goals, Attentive, Accepted Support, Alert, Demonstrates Behavior Change    Name of Group:  4C Group Therapy and Mental Health Management     Progress Note   The client reported being safe today.  The group discussed different areas of their lives, and how they are able to care for themselves, get out in the community, and their feelings for the past few days.       Sleep:  Poor sleep, [facility]  Medication: As prescribed, ok  Concentration: Low, but watches TV  Appetite:  Good, eating salads    Interest level and activities: She talks with her kids by phone and with her house-mates.    Social contacts: Foster Care providers, family  Feelings:   Anxious, worried about things in the future, feels guilty about her  dying  Psychosis:  Decreased symptoms  Skills used:  Distraction, relaxing by listening to music     Mental Health Management   The group participated in a discussion about levels of anger.  The group talked about different ways to express anger.  The group worked on a structured activity to learn skills about identifying feelings and thought distortions.  The group discussion resulted in them learning a new skill to apply to their daily skills.        Is this a Weekly Review of the Progress on the Treatment Plan?  Yes.      Are  Treatment Plan Goals being addressed?  Yes, continue treatment goals      Are Treatment Plan Strategies to Address Goals Effective?  Yes, continue treatment strategies      Are there any current contracts in place?  No

## 2017-01-10 NOTE — MR AVS SNAPSHOT
After Visit Summary   1/10/2017    Nena Tang    MRN: [id number]           Patient Information     Date Of Birth          1960        Visit Information        Provider Department      1/10/2017 8:30 PM BED 4  SLEEP [facility]        Today's Diagnoses     Sleeping difficulties         CINDY (obstructive sleep apnea)- mild AHI 10.3            Follow-ups after your visit        Your next 10 appointments already scheduled     Jan 11, 2017 12:30 PM   SHORT with Kenzie Sheehan   [facility] ([facility])    [address] [place] [address] [address] [place] [address]   [phone]            Jan 12, 2017  1:00 PM   Treatment with ADULT  DAY 4C   [facility] ([facility])    [address] [place] [address]   [phone]            Jan 16, 2017  1:00 PM   Treatment with ADULT MH DAY 4C   [facility] ([facility])    [address] [place] [address]   [phone]            Jan 17, 2017  1:00 PM   Treatment with ADULT MH DAY 4C   [facility] ([facility])    [address] [place] [address]   [phone]            Jan 19, 2017  1:00 PM   Treatment with ADULT  DAY 4C   [facility] ([facility])    [address] [place] [address]   [phone]            Jan 23, 2017 12:30 PM   RETURN RETINA with Tiburcio Chacon MD   [facility] ([facility])    [address]  [address] [facility]  [address] [address]  [facility] [address]   [phone]            Jan 23, 2017  1:00 PM   Treatment with ADULT MH DAY 4C   [facility] ([facility]  "[facility])    [address] [address]   [phone]            Jan 24, 2017  1:00 PM   Treatment with ADULT MH DAY 4C   [facility] ([facility])    [address] [address]   [phone]            Jan 26, 2017  1:00 PM   Treatment with ADULT MH DAY 4C   [facility] ([facility])    [address] [address]   [phone]            Jan 27, 2017  9:30 AM   Return Sleep Patient with William Eng MD   [facility] ([facility])    [address]  [facility] [address] [phone]              Who to contact     If you have questions or need follow up information about today's clinic visit or your schedule please contact [facility] directly at [phone].  Normal or non-critical lab and imaging results will be communicated to you by OyaGenhart, letter or phone within 4 business days after the clinic has received the results. If you do not hear from us within 7 days, please contact the clinic through OyaGenhart or phone. If you have a critical or abnormal lab result, we will notify you by phone as soon as possible.  Submit refill requests through LIFEMODELER or call your pharmacy and they will forward the refill request to us. Please allow 3 business days for your refill to be completed.          Additional Information About Your Visit        LIFEMODELER Information     LIFEMODELER lets you send messages to your doctor, view your test results, renew your prescriptions, schedule appointments and more. To sign up, go to [email or web address] . Click on \"Log in\" on the left side of the screen, which will take you to the Welcome page. Then click on \"Sign up Now\" on the right side of the page.     You will be asked to enter the access " code listed below, as well as some personal information. Please follow the directions to create your username and password.     Your access code is: [email or web address]  Expires: 2017  3:41 PM     Your access code will  in 90 days. If you need help or a new code, please call your [facility] clinic or [phone].        Care EveryWhere ID     This is your Care EveryWhere ID. This could be used by other organizations to access your [facility] medical records  [id number]        Your Vitals Were     Last Period                   2015            Blood Pressure from Last 3 Encounters:   17 134/82   16 162/81   16 116/63    Weight from Last 3 Encounters:   17 99.338 kg (219 lb)   16 99.428 kg (219 lb 3.2 oz)   16 99.791 kg (220 lb)              We Performed the Following     Comprehensive Sleep Study          Today's Medication Changes          These changes are accurate as of: 1/10/17 11:59 PM.  If you have any questions, ask your nurse or doctor.               These medicines have changed or have updated prescriptions.        Dose/Directions    gabapentin 300 MG capsule   Commonly known as:  NEURONTIN   This may have changed:  when to take this   Used for:  Type 2 diabetes mellitus with diabetic neuropathy, with long-term current use of insulin (H)        Dose:  600 mg   Take 2 capsules (600 mg) by mouth 3 times daily   Quantity:  180 capsule   Refills:  2                Primary Care Provider    None Specified       No primary provider on file.        Goals        Patient-Stated    Medical     Goal Comments - Note created  2016  9:15 AM by Chelsea Pulido RN    Get blood sugars under control    Improve medication compliance    As of today's date 2016 goal is met at 0 - 25%.   Goal Status:  Active          Thank you!     Thank you for choosing [facility]  for your care. Our goal is always to provide you with excellent care. Hearing back from  our patients is one way we can continue to improve our services. Please take a few minutes to complete the written survey that you may receive in the mail after your visit with us. Thank you!             Your Updated Medication List - Protect others around you: Learn how to safely use, store and throw away your medicines at www.disposemymeds.org.          This list is accurate as of: 1/10/17 11:59 PM.  Always use your most recent med list.                   Brand Name Dispense Instructions for use    acetaminophen 500 MG tablet    TYLENOL    100 tablet    Take 2 tablets (1,000 mg) by mouth every 6 hours as needed for pain       aspirin 81 MG EC tablet    ASPIRIN LOW DOSE    100 tablet    Take 1 tablet (81 mg) by mouth daily       atorvastatin 80 MG tablet    LIPITOR    30 tablet    Take 1 tablet (80 mg) by mouth daily       benztropine 1 MG tablet    COGENTIN    60 tablet    Take 1 tablet (1 mg) by mouth 2 times daily       blood glucose monitoring lancets     1 Box    Use to test blood sugar 2 times daily or as directed.  Ok to substitute alternative if insurance prefers.       blood glucose monitoring test strip    ONE TOUCH ULTRA    1 Box    Check blood sugar 2 times a day as directed, One Touch Ultra Blue Test Strips Please match with Pt's Insurance and meter.       cholecalciferol 48557 UNITS capsule    VITAMIN D3    7 capsule    Take 1 capsule (50,000 Units) by mouth once a week FOR LOW VITAMIN D       citalopram 20 MG tablet    celeXA    30 tablet    Take 1 tablet (20 mg) by mouth daily       gabapentin 300 MG capsule    NEURONTIN    180 capsule    Take 2 capsules (600 mg) by mouth 3 times daily       glipiZIDE 5 MG 24 hr tablet    glipiZIDE XL    30 tablet    Take 1 tablet (5 mg) by mouth daily       glucose 4 G Chew chewable tablet     20 tablet    Take 2 every 15 minutes for blood sugar <70mg/dL. Recheck blood sugar every 15 minutes until above 70mg/dL, then eat a substantial meal.       haloperidol 5 MG  tablet    HALDOL    30 tablet    Take 1 tablet (5 mg) by mouth At Bedtime       ibuprofen 600 MG tablet    ADVIL/MOTRIN    60 tablet    Take 1 tablet (600 mg) by mouth every 4 hours as needed for moderate pain       insulin aspart 100 UNIT/ML injection    NovoLOG PEN    1 Month    Inject 20 Units Subcutaneous 3 times daily (with meals) Inject an extra 7 units once daily for blood sugars over 500mg/dL. Call the clinic if recheck is over 500mg/dL.       insulin glargine 100 UNIT/ML injection    LANTUS    12 mL    Inject 45 Units Subcutaneous At Bedtime       insulin pen needle 31G X 6 MM     100 each    Use 4 daily or as directed.       losartan 100 MG tablet    COZAAR    30 tablet    Take 1 tablet (100 mg) by mouth daily       melatonin 5 MG Caps     90 capsule    Take 1 capsule by mouth daily At dinnertime       metoprolol 100 MG 24 hr tablet    TOPROL-XL    30 tablet    Take 1 tablet (100 mg) by mouth daily       omeprazole 40 MG capsule    priLOSEC    30 capsule    Take 1 capsule (40 mg) by mouth daily       * order for DME     1 Device    Equipment being ordered: Rolling walker       * order for DME     1 each    Hold Novolog if meals are refused.       senna-docusate 8.6-50 MG per tablet    SENOKOT-S;PERICOLACE    100 tablet    Take 1 tablet by mouth 2 times daily as needed for constipation       * Notice:  This list has 2 medication(s) that are the same as other medications prescribed for you. Read the directions carefully, and ask your doctor or other care provider to review them with you.

## 2017-01-10 NOTE — PROGRESS NOTES
Group Therapy Progress Notes     Area of Treatment Focus:  Symptom Management    Therapeutic Interventions/Treatment Strategies:  Support, Feedback, Safety Assessments, Clarification Structured Activity and Education    Response to Treatment Strategies:  Accepted Feedback, Listened, Attentive, Accepted Support and Alert    Name of Group:  Life Skills    Progress Note  Client presented with calm,even mood.Good focus and concentration during a discussion related to stress management and coping skills. Thought process clear,goal directed and reality based.      Is this a Weekly Review of the Progress on the Treatment Plan?  Yes.      Are Treatment Plan Goals being addressed?  Yes, continue treatment goals      Are Treatment Plan Strategies to Address Goals Effective?  Yes, continue treatment strategies      Are there any current contracts in place?  No

## 2017-01-11 ENCOUNTER — OFFICE VISIT (OUTPATIENT)
Dept: PHARMACY | Facility: CLINIC | Age: 57
End: 2017-01-11
Payer: COMMERCIAL

## 2017-01-11 DIAGNOSIS — E11.3299 TYPE 2 DIABETES MELLITUS WITH MILD NONPROLIFERATIVE RETINOPATHY WITHOUT MACULAR EDEMA, WITH LONG-TERM CURRENT USE OF INSULIN, UNSPECIFIED LATERALITY (H): Primary | ICD-10-CM

## 2017-01-11 DIAGNOSIS — Z79.4 TYPE 2 DIABETES MELLITUS WITH MILD NONPROLIFERATIVE RETINOPATHY WITHOUT MACULAR EDEMA, WITH LONG-TERM CURRENT USE OF INSULIN, UNSPECIFIED LATERALITY (H): Primary | ICD-10-CM

## 2017-01-11 PROCEDURE — 99606 MTMS BY PHARM EST 15 MIN: CPT | Mod: GY | Performed by: PHARMACIST

## 2017-01-11 PROCEDURE — 99607 MTMS BY PHARM ADDL 15 MIN: CPT | Mod: GY | Performed by: PHARMACIST

## 2017-01-11 NOTE — MR AVS SNAPSHOT
After Visit Summary   1/11/2017    Nena Tang    MRN: [id number]           Patient Information     Date Of Birth          1960        Visit Information        Provider Department      1/11/2017 12:30 PM Kenzie Sheehan [facility] Primary [facility] MTM        Today's Diagnoses     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, with long-term current use of insulin, unspecified laterality (H)    -  1       Care Instructions    Recommendations from today's MTM visit:                                                      1. Stop taking glipizide on Sunday. Start taking Trulicity 0.75mg every week on Monday with the vitamin D. The two meds we talked about today were Trulicity and Victoza.     Next MTM visit:  2/3/17, see appointment notes    To schedule another MTM appointment, please call the clinic directly or you may call the MTM scheduling line at [phone] or toll-free at [phone].     My Clinical Pharmacist's contact information:                                                      It was a pleasure seeing you today!  Please feel free to contact me with any questions or concerns you have.      Kenzie Sheehan, PharmD  Medication Therapy Management Pharmacist  Pager: [phone]    You may receive a survey about the MTM services you received.  I would appreciate your feedback to help me serve you better in the future. Please fill it out and return it when you can. Your comments will be anonymous.            Follow-ups after your visit        Your next 10 appointments already scheduled     Jan 12, 2017  1:00 PM   Treatment with ADULT  DAY 4C   [facility] ([facility])    [address] [place] [address]   [phone]            Jan 16, 2017  1:00 PM   Treatment with ADULT MH DAY 4C   [facility] ([facility])    [address] [address]   [phone]            Jan 17, 2017  1:00 PM   Treatment with ADULT MH DAY 4C   [facility] ([facility])    [facility] [address] [address]   [phone]            Jan 19, 2017  1:00 PM   Treatment with ADULT MH DAY 4C   [facility] ([facility])    [facility] [address] [address]   [phone]            Jan 23, 2017 12:30 PM   RETURN RETINA with Tiburcio Chacon MD   [facility])    [address]  [address] [place] [address]  [address]  [facility] [address]   [phone]            Jan 23, 2017  1:00 PM   Treatment with ADULT MH DAY 4C   [facility] ([facility])    [facility] [address] [address]   [phone]            Jan 24, 2017  1:00 PM   Treatment with ADULT MH DAY 4C   [facility] ([facility])    [facility] [address] [address]   [phone]            Jan 26, 2017  1:00 PM   Treatment with ADULT MH DAY 4C   [facility] ([facility])    [address] [place] [address]   [phone]            Jan 27, 2017  9:30 AM   Return Sleep Patient with William Eng MD   [facility] ([facility])    [address]  [facility] [address]   [phone]            Jan 30, 2017  1:00 PM   Treatment with ADULT MH DAY 4C   [facility] ([facility])    [facility] [address] [address]   [phone]              Who to contact     If you have questions  "or need follow up information about today's clinic visit or your schedule please contact [facility] directly at [phone].  Normal or non-critical lab and imaging results will be communicated to you by MyChart, letter or phone within 4 business days after the clinic has received the results. If you do not hear from us within 7 days, please contact the clinic through Platizahart or phone. If you have a critical or abnormal lab result, we will notify you by phone as soon as possible.  Submit refill requests through Tapingo or call your pharmacy and they will forward the refill request to us. Please allow 3 business days for your refill to be completed.          Additional Information About Your Visit        PlatizaharAdpeps Information     Tapingo lets you send messages to your doctor, view your test results, renew your prescriptions, schedule appointments and more. To sign up, go to [email or web address] . Click on \"Log in\" on the left side of the screen, which will take you to the Welcome page. Then click on \"Sign up Now\" on the right side of the page.     You will be asked to enter the access code listed below, as well as some personal information. Please follow the directions to create your username and password.     Your access code is: [email or web address]  Expires: 2017  3:41 PM     Your access code will  in 90 days. If you need help or a new code, please call your [place] clinic or [phone].        Care EveryWhere ID     This is your Care EveryWhere ID. This could be used by other organizations to access your [place] medical records  [id number]        Your Vitals Were     Last Period                   2015            Blood Pressure from Last 3 Encounters:   17 134/82   16 162/81   16 116/63    Weight from Last 3 Encounters:   17 219 lb (99.338 kg)   16 219 lb 3.2 oz (99.428 kg)   16 220 lb (99.791 kg)              Today, you had the " following     No orders found for display         Today's Medication Changes          These changes are accurate as of: 1/11/17  1:17 PM.  If you have any questions, ask your nurse or doctor.               Start taking these medicines.        Dose/Directions    dulaglutide 0.75 MG/0.5ML pen   Commonly known as:  TRULICITY   Used for:  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, with long-term current use of insulin, unspecified laterality (H)        Dose:  0.75 mg   Inject 0.75 mg Subcutaneous every 7 days   Quantity:  2 mL   Refills:  0         These medicines have changed or have updated prescriptions.        Dose/Directions    gabapentin 300 MG capsule   Commonly known as:  NEURONTIN   This may have changed:  when to take this   Used for:  Type 2 diabetes mellitus with diabetic neuropathy, with long-term current use of insulin (H)        Dose:  600 mg   Take 2 capsules (600 mg) by mouth 3 times daily   Quantity:  180 capsule   Refills:  2            Where to get your medicines      These medications were sent to [facility] - [place], [place] - [address] [facility] [address], [facility] [address]     Phone:  [phone]    - dulaglutide 0.75 MG/0.5ML pen             Primary Care Provider    None Specified       No primary provider on file.        Goals        Patient-Stated    Medical     Goal Comments - Note created  7/7/2016  9:15 AM by Chelsea Pulido, RN    Get blood sugars under control    Improve medication compliance    As of today's date 7/7/2016 goal is met at 0 - 25%.   Goal Status:  Active          Thank you!     Thank you for choosing [facility]  for your care. Our goal is always to provide you with excellent care. Hearing back from our patients is one way we can continue to improve our services. Please take a few minutes to complete the written survey that you may receive in the mail after your visit with us.  Thank you!             Your Updated Medication List - Protect others around you: Learn how to safely use, store and throw away your medicines at www.disposemymeds.org.          This list is accurate as of: 1/11/17  1:17 PM.  Always use your most recent med list.                   Brand Name Dispense Instructions for use    acetaminophen 500 MG tablet    TYLENOL    100 tablet    Take 2 tablets (1,000 mg) by mouth every 6 hours as needed for pain       aspirin 81 MG EC tablet    ASPIRIN LOW DOSE    100 tablet    Take 1 tablet (81 mg) by mouth daily       atorvastatin 80 MG tablet    LIPITOR    30 tablet    Take 1 tablet (80 mg) by mouth daily       benztropine 1 MG tablet    COGENTIN    60 tablet    Take 1 tablet (1 mg) by mouth 2 times daily       blood glucose monitoring lancets     1 Box    Use to test blood sugar 2 times daily or as directed.  Ok to substitute alternative if insurance prefers.       blood glucose monitoring test strip    ONE TOUCH ULTRA    1 Box    Check blood sugar 2 times a day as directed, One Touch Ultra Blue Test Strips Please match with Pt's Insurance and meter.       cholecalciferol 76435 UNITS capsule    VITAMIN D3    7 capsule    Take 1 capsule (50,000 Units) by mouth once a week FOR LOW VITAMIN D       citalopram 20 MG tablet    celeXA    30 tablet    Take 1 tablet (20 mg) by mouth daily       dulaglutide 0.75 MG/0.5ML pen    TRULICITY    2 mL    Inject 0.75 mg Subcutaneous every 7 days       gabapentin 300 MG capsule    NEURONTIN    180 capsule    Take 2 capsules (600 mg) by mouth 3 times daily       glipiZIDE 5 MG 24 hr tablet    glipiZIDE XL    30 tablet    Take 1 tablet (5 mg) by mouth daily       glucose 4 G Chew chewable tablet     20 tablet    Take 2 every 15 minutes for blood sugar <70mg/dL. Recheck blood sugar every 15 minutes until above 70mg/dL, then eat a substantial meal.       haloperidol 5 MG tablet    HALDOL    30 tablet    Take 1 tablet (5 mg) by mouth At Bedtime        ibuprofen 600 MG tablet    ADVIL/MOTRIN    60 tablet    Take 1 tablet (600 mg) by mouth every 4 hours as needed for moderate pain       insulin aspart 100 UNIT/ML injection    NovoLOG PEN    1 Month    Inject 20 Units Subcutaneous 3 times daily (with meals) Inject an extra 7 units once daily for blood sugars over 500mg/dL. Call the clinic if recheck is over 500mg/dL.       insulin glargine 100 UNIT/ML injection    LANTUS    12 mL    Inject 45 Units Subcutaneous At Bedtime       insulin pen needle 31G X 6 MM     100 each    Use 4 daily or as directed.       losartan 100 MG tablet    COZAAR    30 tablet    Take 1 tablet (100 mg) by mouth daily       melatonin 5 MG Caps     90 capsule    Take 1 capsule by mouth daily At dinnertime       metoprolol 100 MG 24 hr tablet    TOPROL-XL    30 tablet    Take 1 tablet (100 mg) by mouth daily       omeprazole 40 MG capsule    priLOSEC    30 capsule    Take 1 capsule (40 mg) by mouth daily       * order for DME     1 Device    Equipment being ordered: Rolling walker       * order for DME     1 each    Hold Novolog if meals are refused.       senna-docusate 8.6-50 MG per tablet    SENOKOT-S;PERICOLACE    100 tablet    Take 1 tablet by mouth 2 times daily as needed for constipation       * Notice:  This list has 2 medication(s) that are the same as other medications prescribed for you. Read the directions carefully, and ask your doctor or other care provider to review them with you.

## 2017-01-11 NOTE — PROGRESS NOTES
Group Therapy Progress Notes     Area of Treatment Focus:  Symptom Management    Therapeutic Interventions/Treatment Strategies:  Support, Feedback, Safety Assessments, Clarification Structured Activity and Education    Response to Treatment Strategies:  Accepted Feedback, Listened, Attentive, Accepted Support and Alert    Name of Group:  Life Skills    Progress Note  Client presented with calm,even mood.Good focus and concentration during a discussion related to communication effectiveness. Thought process clear,goal directed and reality based.      Is this a Weekly Review of the Progress on the Treatment Plan?  Yes.      Are Treatment Plan Goals being addressed?  Yes, continue treatment goals      Are Treatment Plan Strategies to Address Goals Effective?  Yes, continue treatment strategies      Are there any current contracts in place?  No

## 2017-01-11 NOTE — PATIENT INSTRUCTIONS
Recommendations from today's MTM visit:                                                      1. Stop taking glipizide on Sunday. Start taking Trulicity 0.75mg every week on Monday with the vitamin D. The two meds we talked about today were Trulicity and Victoza.     Next MTM visit:  2/3/17, see appointment notes    To schedule another MTM appointment, please call the clinic directly or you may call the MTM scheduling line at [phone] or toll-free at [phone].     My Clinical Pharmacist's contact information:                                                      It was a pleasure seeing you today!  Please feel free to contact me with any questions or concerns you have.      Kenzie Sheehan, PharmD  Medication Therapy Management Pharmacist  Pager: [phone]    You may receive a survey about the MTM services you received.  I would appreciate your feedback to help me serve you better in the future. Please fill it out and return it when you can. Your comments will be anonymous.

## 2017-01-11 NOTE — PROGRESS NOTES
Completed a split night PSG per provider order.    Preliminary AHI >30.  A final therapeutic PAP pressure was achieved.    Supine REM was not seen on therapeutic pressure.    Patient reports feeling refreshed in AM.

## 2017-01-11 NOTE — PROGRESS NOTES
"SUBJECTIVE/OBJECTIVE:                                                    Nena Tang is a 56 year old female coming in for a follow-up visit for Medication Therapy Management.  She was referred to me from Jud King. Pt accompanied by \"foster mother\" - pt now in adult foster care, living with 4 other women.    Chief Complaint: Follow up from our visit on 16.  DM f/up.     Medication Adherence: Much improved now in adult foster care - daily oversight of her medications and they seem very involved.    Diabetes:  Pt currently taking Lantus 45units daily, Novolog 20units TID prior to meals +7 units daily for sugars >500 mg/dL, glipizide XL 5mg daily.  Pt is not experiencing side effects.   SMB-2 times daily - fasting and later in the day.   Ranges (based on BG log):  mg/dL  Symptoms of low blood sugar? none. Frequency of hypoglycemia? never.  Recent symptoms of high blood sugar? vision changes, polydipsia, fatigue, tingling and numbness  Microalbumin is < 30 mg/g. Pt is taking an ACEi/ARB.  Aspirin: Taking 81mg daily and denies side effects  Diet: Unchanged.    Current labs include:  BP Readings from Last 3 Encounters:   17 134/82   16 162/81   16 116/63     A1C      9.7   2016  A1C      9.3   2016  A1C      9.8   2016  A1C      9.5   2016  A1C     12.4   2016    Recent Labs   Lab Test  16   1350  07/14/15   1215   13   1156   CHOL   --   147   --   161   HDL   --   39*   --   37*   LDL  137*  78   < >  90   TRIG   --   151*   --   171*   CHOLHDLRATIO   --   3.8   --   4.4    < > = values in this interval not displayed.       ALT       23   5/3/2016    MICROL       23   2016  MICROALBUMIN    11.39   2016    Last Basic Metabolic Panel:  NA      134   6/15/2016   POTASSIUM      4.5   6/15/2016  CHLORIDE      104   6/15/2016  BUN       19   6/15/2016  BUN     13.5   2009  CR     0.70   6/15/2016    GFR ESTIMATE IF BLACK "   Date Value Ref Range Status   12/29/2016 >90   GFR Calc   >60 mL/min/1.7m2 Final   12/21/2016 >90   GFR Calc   >60 mL/min/1.7m2 Final   11/11/2016 73 >60 mL/min/1.7m2 Final     Comment:      GFR Calc     TSH   Date Value Ref Range Status   12/29/2016 2.24 0.40 - 4.00 mU/L Final   ]  LMP 01/06/2015    Most Recent Immunizations   Administered Date(s) Administered     Influenza (IIV3) 09/24/2012     Influenza Vaccine IM 3yrs+ 4 Valent IIV4 09/16/2016     Mantoux 07/10/2014     Pneumococcal 23 valent 01/22/2009     TDAP (ADACEL AGES 11-64) 01/22/2009     ASSESSMENT:                                                    Current medications were reviewed with her today.     Medication Adherence:  Much improved, updated med list given to foster home staff.    Diabetes: Needs Improvement. Patient is not at A1C goal of <7%,  mg/dL fasting, and <180 mg/dL post-prandial, though the numbers have improved slightly. Discussed the effect of weight loss, and pt would like to try a medication that will help with weight loss so she can potentially stop the Lantus. She would like to try a GLP-1 agonist because she has a lot of food cravings and she knows how to give injections. Should stop glipizide at that time to avoid hypoglycemia episodes.      PLAN:                                                      1. Start Trulicity 0.75mg weekly, stop glipizide.     I spent 30 minutes with this patient today.  All changes were made via collaborative practice agreement with Jud Real. A copy of the visit note was provided to the patient's primary care provider.     Will follow up in one month, appointment scheduled..    The patient was given a summary of these recommendations as an after visit summary.    Kenzie Sheehan, PharmD  Medication Therapy Management Pharmacist  Pager: [phone]

## 2017-01-12 ENCOUNTER — HOSPITAL ENCOUNTER (OUTPATIENT)
Dept: BEHAVIORAL HEALTH | Facility: CLINIC | Age: 57
End: 2017-01-12
Attending: PSYCHOLOGIST
Payer: MEDICARE

## 2017-01-12 PROCEDURE — H2012 BEHAV HLTH DAY TREAT, PER HR: HCPCS

## 2017-01-12 NOTE — PROGRESS NOTES
Group Therapy Progress Notes     Area of Treatment Focus:  Symptom Management, Personal Safety, Community Resources/Discharge Planning, Abstinence/Relapse Prevention, Develop/Improve Independent Living/Socialization Skills/Interpersonal Relationships and Cultural/Racial/Health and Spiritual    Therapeutic Interventions/Treatment Strategies:  Support, Redirection, Feedback, Limit/Boundaries, Safety Assessments, Structuresd Activity, Problem Solving, Clarification, Education, Motivational Enhancement Therapy, Cognitive Behavioral Therapy and Structured Activity    Response to Treatment Strategies:  Accepted Feedback, Gave Feedback, Listened, Focused on Goals, Attentive, Accepted Support, Alert, Demonstrates Behavior Change    Name of Group:  4C Group Therapy     Progress Note   The client reported being safe today.  The group discussed different areas of their lives, and how they are able to care for themselves, get out in the community, and their feelings for the past few days.       Sleep:  Nena said she did a sleep study and needs to wear her C Pap and her machine needs to be cleaned. She said    That she stopped breathing about 30 times last night, during the study.  Medication: As prescribed  Concentration: ok  Appetite:  Eating healthy foods  Interest level and activities: She does things with the Foster Care, and once a week they walk in the mall.    Social contacts: House-mates  Feelings:  Ok  Psychosis: Decreased  Skills:  She reported that she keeps her appointments, watch TV with others, walks 1-2 x a week at the mall and talks    To her grandkids and kids.                 Is this a Weekly Review of the Progress on the Treatment Plan?  Yes.      Are Treatment Plan Goals being addressed?  Yes, continue treatment goals      Are Treatment Plan Strategies to Address Goals Effective?  Yes, continue treatment strategies      Are there any current contracts in place?  No

## 2017-01-13 NOTE — PROGRESS NOTES
Group Therapy Progress Notes     Area of Treatment Focus:  Symptom Management    Therapeutic Interventions/Treatment Strategies:  Support, Feedback, Safety Assessments, Clarification Structured Activity and Education    Response to Treatment Strategies:  Accepted Feedback, Listened, Attentive, Accepted Support and Alert    Name of Group:  Life Skills    Progress Note  Client presented with calm,even mood.Good focus and concentration during a discussion related to self esteem and strategies for self improvement. Client acknowledged that in general she does not trust people but as a person does like herself. Client also acknowledged that she had another sleep study and it was recommended that she use her CPAP which she has but that it needs to be cleaned. Thought process clear,goal directed and reality based.      Is this a Weekly Review of the Progress on the Treatment Plan?  Yes.      Are Treatment Plan Goals being addressed?  Yes, continue treatment goals      Are Treatment Plan Strategies to Address Goals Effective?  Yes, continue treatment strategies      Are there any current contracts in place?  No

## 2017-01-16 ENCOUNTER — HOSPITAL ENCOUNTER (OUTPATIENT)
Dept: BEHAVIORAL HEALTH | Facility: CLINIC | Age: 57
End: 2017-01-16
Attending: PSYCHOLOGIST
Payer: MEDICARE

## 2017-01-16 VITALS — HEIGHT: 60 IN | WEIGHT: 219 LBS | BODY MASS INDEX: 43 KG/M2

## 2017-01-16 PROCEDURE — H2012 BEHAV HLTH DAY TREAT, PER HR: HCPCS

## 2017-01-16 NOTE — PROGRESS NOTES
RN Review of Medical History / Physical Health Screen  Outpatient Behavioral Programs      CLIENT'S NAME: Nena Tang  MRN:   [id number]  :   1960 AGE:56 year old SEX: female    DATE OF DIAGNOSTIC ASSESSMENT: 16  DATE OF ADMISSION: 16  PROGRAM: Day Treatment (DT)      Following admission, the RN reviewed the following:    - Medical History / Physical Health Screen completed during the DA noted above.  - Immediate Health Concerns as noted on the Initial Individual Treatment Plan.    Client height and weight recorded by RN in epic: yes    BMI Review:  Was the patient informed of BMI? yes      Findings Above, 42.86       RN Recommendations include:  Educate on nutrition and exercise to help reach BMI WNL.    Machelle Vogt  2017

## 2017-01-16 NOTE — PROGRESS NOTES
Group Therapy Progress Notes     Area of Treatment Focus:  Symptom Management, Personal Safety, Community Resources/Discharge Planning, Abstinence/Relapse Prevention, Develop/Improve Independent Living/Socialization Skills/Interpersonal Relationships and Cultural/Racial/Health and Spiritual    Therapeutic Interventions/Treatment Strategies:  Support, Redirection, Feedback, Limit/Boundaries, Safety Assessments, Structuresd Activity, Problem Solving, Clarification, Education, Motivational Enhancement Therapy, Cognitive Behavioral Therapy and Structured Activity    Response to Treatment Strategies:  Accepted Feedback, Gave Feedback, Listened, Focused on Goals, Attentive, Accepted Support, Alert, Demonstrates Behavior Change    Name of Group:  4C Group Therapy     Progress Note   The client reported being safe today.  The group discussed different areas of their lives, and how they are able to care for themselves, get out in the community, and their feelings for the past few days.     Nena reported the following:    Sleep:   Poor and she has had nightmares  Medication:  ok  Concentration: poor  Appetite:  good  Interest level and activities: She went to her grandkids and visited with them and talked to her kids  Social contacts: family  Feelings:   Anxious  Psychosis:  She reported that she sees a man in her room, and he walks towards her and then disappears. This     Scares her, so she leaves the light on in her room.                 Is this a Weekly Review of the Progress on the Treatment Plan?  Yes.      Are Treatment Plan Goals being addressed?  Yes, continue treatment goals      Are Treatment Plan Strategies to Address Goals Effective?  Yes, continue treatment strategies      Are there any current contracts in place?  No

## 2017-01-17 ENCOUNTER — TELEPHONE (OUTPATIENT)
Dept: FAMILY MEDICINE | Facility: CLINIC | Age: 57
End: 2017-01-17

## 2017-01-17 ENCOUNTER — MEDICAL CORRESPONDENCE (OUTPATIENT)
Dept: HEALTH INFORMATION MANAGEMENT | Facility: CLINIC | Age: 57
End: 2017-01-17

## 2017-01-17 ENCOUNTER — HOSPITAL ENCOUNTER (OUTPATIENT)
Dept: BEHAVIORAL HEALTH | Facility: CLINIC | Age: 57
End: 2017-01-17
Attending: PSYCHOLOGIST
Payer: MEDICARE

## 2017-01-17 DIAGNOSIS — Z79.4 TYPE 2 DIABETES MELLITUS WITH DIABETIC NEUROPATHY, WITH LONG-TERM CURRENT USE OF INSULIN (H): Primary | ICD-10-CM

## 2017-01-17 DIAGNOSIS — E11.40 TYPE 2 DIABETES MELLITUS WITH DIABETIC NEUROPATHY, WITH LONG-TERM CURRENT USE OF INSULIN (H): Primary | ICD-10-CM

## 2017-01-17 PROCEDURE — H2012 BEHAV HLTH DAY TREAT, PER HR: HCPCS

## 2017-01-17 NOTE — PROGRESS NOTES
Group Therapy Progress Notes     Area of Treatment Focus:  Symptom Management    Therapeutic Interventions/Treatment Strategies:  Support, Feedback, Safety Assessments, Clarification Structured Activity and Education    Response to Treatment Strategies:  Accepted Feedback, Listened, Attentive, Accepted Support and Alert    Name of Group:  Life Skills    Progress Note  Client presented with calm,even mood.Good focus and concentration during a discussion related to stress management with a focus on resiliency skills and how stress affects the brain.  Thought process clear,goal directed and reality based.      Is this a Weekly Review of the Progress on the Treatment Plan?  Yes.      Are Treatment Plan Goals being addressed?  Yes, continue treatment goals      Are Treatment Plan Strategies to Address Goals Effective?  Yes, continue treatment strategies      Are there any current contracts in place?  No

## 2017-01-17 NOTE — PROGRESS NOTES
Group Therapy Progress Notes     Area of Treatment Focus:  Symptom Management, Personal Safety, Community Resources/Discharge Planning, Abstinence/Relapse Prevention, Develop/Improve Independent Living/Socialization Skills/Interpersonal Relationships and Cultural/Racial/Health and Spiritual    Therapeutic Interventions/Treatment Strategies:  Support, Redirection, Feedback, Limit/Boundaries, Safety Assessments, Structured Activity, Problem Solving, Clarification, Education, Motivational Enhancement Therapy, Cognitive Behavioral Therapy and Structured Activity    Response to Treatment Strategies:  Accepted Feedback, Gave Feedback, Listened, Focused on Goals, Attentive, Accepted Support, Alert, Demonstrates Behavior Change    Name of Group:  4[facility] Group Therapy and Mental Health Management     Progress Note   The client reported being safe today.  The group discussed different areas of their lives, and how they are able to care for themselves, get out in the community, and their feelings for the past few days.     The following was reported:    Sleep:   Poor sleep and she has nightmares that wake her  Medication:  As prescribed  Concentration: fair  Appetite:  good  Interest level and activities: She talked to house-mates and rested at home     She is watching her nutrition and following a diabetic diet.  Social contacts: Staff and house-mates  Feelings:   Some anxiety, since she sees a man in her bedroom that walks towards her then disappears.  Psychosis:  Paranoia and visual hallucinations.  She hears voices talking.  Skills used:   Reality Checks and she keeps a small light on in her room to feel safe from the man.     Mental Health Management   The group participated in a discussion about thought distortions and negative self-talk.   The group talked about different ways to identify thought distortions.  The group worked on a structured activity to learn skills about the different styles of thought distortions and how  to do a structured journal.  The group discussion resulted in them learning a new skill to apply to their daily skills and identified positive words about themselves.        Is this a Weekly Review of the Progress on the Treatment Plan?  Yes.      Are Treatment Plan Goals being addressed?  Yes, continue treatment goals      Are Treatment Plan Strategies to Address Goals Effective?  Yes, continue treatment strategies      Are there any current contracts in place?  No

## 2017-01-17 NOTE — TELEPHONE ENCOUNTER
Incoming call from pt's  stating that pt's test strips need a prior auth    Prior Authorization needed on:  01/17/17    Medication:  blood glucose monitoring (ONE TOUCH ULTRA) test strip Dose:  N/A    Pharmacy confirmed as:  [facility] - [place], [place] - [address]  [address]  [facility] [address]  Phone: [phone] Fax: [phone]     Insurance Name:  MEDICARE  Insurance Phone: [phone]  Insurance Patient ID: [id number]    Writer placed form in provider's folder    Val Valenzuela January 17, 2017 at 1:08 PM

## 2017-01-18 ENCOUNTER — TELEPHONE (OUTPATIENT)
Dept: FAMILY MEDICINE | Facility: CLINIC | Age: 57
End: 2017-01-18

## 2017-01-18 NOTE — TELEPHONE ENCOUNTER
PA not needed for one touch ultra strip. Pt has coverage through Medicare. She is unable to receive one touch ultra strips from [facility] but she has coverage at any retail pharmacy per [facility] like [facility], [facility] or [facility]. Pt may also receive coverage for one touch ultra strips through mail order pharmacy. I will route to pcp to reorder with different pharmacy. I contacted patient she reported she will call back after she selects a pharmacy.   Lio Wilder MA

## 2017-01-18 NOTE — TELEPHONE ENCOUNTER
Incoming call from eder Calle's pharmacy is [facility] [address] [place] [address]  For questions please call Alva at [phone]      Thank you,     Giorgi Case     [facility]

## 2017-01-18 NOTE — PROGRESS NOTES
Group Therapy Progress Notes     Area of Treatment Focus:  Symptom Management    Therapeutic Interventions/Treatment Strategies:  Support, Feedback, Safety Assessments, Clarification Structured Activity and Education    Response to Treatment Strategies:  Accepted Feedback, Listened, Attentive, Accepted Support and Alert    Name of Group:  Life Skills    Progress Note  Client presented with calm,even mood.Good focus and concentration during a discussion related to communication with a focus on conflict management.   Thought process clear,goal directed and reality based.      Is this a Weekly Review of the Progress on the Treatment Plan?  Yes.      Are Treatment Plan Goals being addressed?  Yes, continue treatment goals      Are Treatment Plan Strategies to Address Goals Effective?  Yes, continue treatment strategies      Are there any current contracts in place?  No

## 2017-01-18 NOTE — TELEPHONE ENCOUNTER
PA not needed for one touch ultra strip. Pt has coverage through Medicare. She is unable to receive one touch ultra strips from [facility] but she has coverage at any retail pharmacy per [facility] like [facility], [facility] or [facility]. Pt may also receive coverage for one touch ultra strips through mail order pharmacy. I will route to pcp to reorder with different pharmacy.  Lio Wilder MA

## 2017-01-19 ENCOUNTER — HOSPITAL ENCOUNTER (OUTPATIENT)
Dept: BEHAVIORAL HEALTH | Facility: CLINIC | Age: 57
End: 2017-01-19
Attending: PSYCHOLOGIST
Payer: MEDICARE

## 2017-01-19 PROCEDURE — H2012 BEHAV HLTH DAY TREAT, PER HR: HCPCS

## 2017-01-19 NOTE — TELEPHONE ENCOUNTER
Pt would like One Touch Ultra Strips sent to [facility] in [place]. Located as pharmacy option in patients chart. I will send to pcp to advise and order.  Lio Wilder MA

## 2017-01-19 NOTE — PROGRESS NOTES
Group Therapy Progress Notes     Area of Treatment Focus:  Symptom Management, Personal Safety, Community Resources/Discharge Planning, Abstinence/Relapse Prevention, Develop/Improve Independent Living/Socialization Skills/Interpersonal Relationships and Cultural/Racial/Health and Spiritual    Therapeutic Interventions/Treatment Strategies:  Support, Redirection, Feedback, Limit/Boundaries, Safety Assessments, Structured Activity, Problem Solving, Clarification, Education, Motivational Enhancement Therapy, Cognitive Behavioral Therapy and Structured Activity    Response to Treatment Strategies:  Accepted Feedback, Gave Feedback, Listened, Focused on Goals, Attentive, Accepted Support, Alert, Demonstrates Behavior Change    Name of Group:  4[facility] Group Therapy and  Wellness   Progress Note   The client reported being safe today.  The group discussed different areas of their lives, and how they are able to care for themselves, get out in the community, and their feelings for the past few days.     The following was reported:    Sleep:    Nena reported ok sleep last night   Medication:   As prescribed  Concentration:  low  Appetite:   Ok, eating healthy foods at her home  Interest level and activities: She talks with other residents at her home.  She talked to her son, and will watch her 2 year old      Grand-daughter tonight, while he goes to a meeting at the school for his other child.      She reported that she will have cataract surgery on Monday 1/23, and will not be here.    Social contacts:  Staff, residents, son  Feelings:    She felt stable  Psychosis:   Symptoms have decreased  Skills used:   She was resting after her surgery on there thumb.      Wellness   The group participated in a discussion about their favorite activities  The group talked about different ways to find common interests.  The group worked on a structured activity to learn skills about starting conversations and interacting safely with  others.  The group discussion resulted in them learning  skills to apply to their daily skills.        Is this a Weekly Review of the Progress on the Treatment Plan?  Yes.      Are Treatment Plan Goals being addressed?  Yes, continue treatment goals      Are Treatment Plan Strategies to Address Goals Effective?  Yes, continue treatment strategies      Are there any current contracts in place?  No

## 2017-01-20 NOTE — TELEPHONE ENCOUNTER
Rx sent.     Kenzie Sheehan, PharmD  Medication Therapy Management Pharmacist  Pager: [phone]

## 2017-01-20 NOTE — PROGRESS NOTES
Group Therapy Progress Notes     Area of Treatment Focus:  Symptom Management    Therapeutic Interventions/Treatment Strategies:  Support, Feedback, Safety Assessments, Clarification Structured Activity and Education    Response to Treatment Strategies:  Accepted Feedback, Listened, Attentive, Accepted Support and Alert    Name of Group:  Life Skills    Progress Note  Client presented with calm,even mood.Good focus and concentration during a discussion related to self esteem and strategies for self improvement. Also reviewed goals related to stress management,self esteem,motivation and making new friends. During the group client also received some distressing news per phone call that her son's wife received a diagnosis of pancreatic cancer.      Is this a Weekly Review of the Progress on the Treatment Plan?  Yes.      Are Treatment Plan Goals being addressed?  Yes, continue treatment goals      Are Treatment Plan Strategies to Address Goals Effective?  Yes, continue treatment strategies      Are there any current contracts in place?  No

## 2017-01-22 ENCOUNTER — TELEPHONE (OUTPATIENT)
Dept: NURSING | Facility: CLINIC | Age: 57
End: 2017-01-22

## 2017-01-22 ENCOUNTER — HOSPITAL ENCOUNTER (EMERGENCY)
Facility: CLINIC | Age: 57
Discharge: HOME | End: 2017-01-22
Attending: INTERNAL MEDICINE | Admitting: INTERNAL MEDICINE
Payer: MEDICARE

## 2017-01-22 VITALS
SYSTOLIC BLOOD PRESSURE: 161 MMHG | HEART RATE: 76 BPM | RESPIRATION RATE: 20 BRPM | DIASTOLIC BLOOD PRESSURE: 100 MMHG | OXYGEN SATURATION: 93 % | TEMPERATURE: 98.6 F

## 2017-01-22 DIAGNOSIS — R11.0 NAUSEA: ICD-10-CM

## 2017-01-22 DIAGNOSIS — N39.0 URINARY TRACT INFECTION, SITE UNSPECIFIED: ICD-10-CM

## 2017-01-22 LAB
ALBUMIN SERPL-MCNC: 3.4 G/DL (ref 3.4–5)
ALBUMIN UR-MCNC: NEGATIVE MG/DL
ALP SERPL-CCNC: 116 U/L (ref 40–150)
ALT SERPL W P-5'-P-CCNC: 25 U/L (ref 0–50)
ANION GAP SERPL CALCULATED.3IONS-SCNC: 9 MMOL/L (ref 3–14)
APPEARANCE UR: CLEAR
AST SERPL W P-5'-P-CCNC: 14 U/L (ref 0–45)
BACTERIA #/AREA URNS HPF: ABNORMAL /HPF
BASOPHILS # BLD AUTO: 0 10E9/L (ref 0–0.2)
BASOPHILS NFR BLD AUTO: 0.6 %
BILIRUB SERPL-MCNC: 0.3 MG/DL (ref 0.2–1.3)
BILIRUB UR QL STRIP: NEGATIVE
BUN SERPL-MCNC: 17 MG/DL (ref 7–30)
CALCIUM SERPL-MCNC: 9.2 MG/DL (ref 8.5–10.1)
CHLORIDE SERPL-SCNC: 104 MMOL/L (ref 94–109)
CO2 SERPL-SCNC: 26 MMOL/L (ref 20–32)
COLOR UR AUTO: ABNORMAL
CREAT SERPL-MCNC: 0.68 MG/DL (ref 0.52–1.04)
DIFFERENTIAL METHOD BLD: ABNORMAL
EOSINOPHIL # BLD AUTO: 0.1 10E9/L (ref 0–0.7)
EOSINOPHIL NFR BLD AUTO: 1.4 %
ERYTHROCYTE [DISTWIDTH] IN BLOOD BY AUTOMATED COUNT: 13 % (ref 10–15)
GFR SERPL CREATININE-BSD FRML MDRD: 90 ML/MIN/1.7M2
GLUCOSE SERPL-MCNC: 125 MG/DL (ref 70–99)
GLUCOSE UR STRIP-MCNC: NEGATIVE MG/DL
HCT VFR BLD AUTO: 33.4 % (ref 35–47)
HGB BLD-MCNC: 11 G/DL (ref 11.7–15.7)
HGB UR QL STRIP: NEGATIVE
IMM GRANULOCYTES # BLD: 0.1 10E9/L (ref 0–0.4)
IMM GRANULOCYTES NFR BLD: 0.7 %
KETONES UR STRIP-MCNC: NEGATIVE MG/DL
LACTATE BLD-SCNC: 1.1 MMOL/L (ref 0.7–2.1)
LEUKOCYTE ESTERASE UR QL STRIP: ABNORMAL
LIPASE SERPL-CCNC: 175 U/L (ref 73–393)
LYMPHOCYTES # BLD AUTO: 3.1 10E9/L (ref 0.8–5.3)
LYMPHOCYTES NFR BLD AUTO: 44.2 %
MCH RBC QN AUTO: 30.6 PG (ref 26.5–33)
MCHC RBC AUTO-ENTMCNC: 32.9 G/DL (ref 31.5–36.5)
MCV RBC AUTO: 93 FL (ref 78–100)
MONOCYTES # BLD AUTO: 0.6 10E9/L (ref 0–1.3)
MONOCYTES NFR BLD AUTO: 8.7 %
MUCOUS THREADS #/AREA URNS LPF: PRESENT /LPF
NEUTROPHILS # BLD AUTO: 3.1 10E9/L (ref 1.6–8.3)
NEUTROPHILS NFR BLD AUTO: 44.4 %
NITRATE UR QL: NEGATIVE
NRBC # BLD AUTO: 0 10*3/UL
NRBC BLD AUTO-RTO: 0 /100
PH UR STRIP: 5.5 PH (ref 5–7)
PLATELET # BLD AUTO: 217 10E9/L (ref 150–450)
POTASSIUM SERPL-SCNC: 4.1 MMOL/L (ref 3.4–5.3)
PROT SERPL-MCNC: 7.9 G/DL (ref 6.8–8.8)
RBC # BLD AUTO: 3.59 10E12/L (ref 3.8–5.2)
RBC #/AREA URNS AUTO: 2 /HPF (ref 0–2)
SODIUM SERPL-SCNC: 139 MMOL/L (ref 133–144)
SP GR UR STRIP: 1.01 (ref 1–1.03)
SQUAMOUS #/AREA URNS AUTO: 1 /HPF (ref 0–1)
TROPONIN I SERPL-MCNC: NORMAL UG/L (ref 0–0.04)
URN SPEC COLLECT METH UR: ABNORMAL
UROBILINOGEN UR STRIP-MCNC: NORMAL MG/DL (ref 0–2)
WBC # BLD AUTO: 6.9 10E9/L (ref 4–11)
WBC #/AREA URNS AUTO: 12 /HPF (ref 0–2)

## 2017-01-22 PROCEDURE — 83690 ASSAY OF LIPASE: CPT | Performed by: INTERNAL MEDICINE

## 2017-01-22 PROCEDURE — 25000128 H RX IP 250 OP 636: Performed by: INTERNAL MEDICINE

## 2017-01-22 PROCEDURE — 85025 COMPLETE CBC W/AUTO DIFF WBC: CPT | Performed by: INTERNAL MEDICINE

## 2017-01-22 PROCEDURE — 96361 HYDRATE IV INFUSION ADD-ON: CPT

## 2017-01-22 PROCEDURE — 87086 URINE CULTURE/COLONY COUNT: CPT | Performed by: INTERNAL MEDICINE

## 2017-01-22 PROCEDURE — 81001 URINALYSIS AUTO W/SCOPE: CPT | Performed by: INTERNAL MEDICINE

## 2017-01-22 PROCEDURE — 80053 COMPREHEN METABOLIC PANEL: CPT | Performed by: INTERNAL MEDICINE

## 2017-01-22 PROCEDURE — 25000125 ZZHC RX 250: Performed by: INTERNAL MEDICINE

## 2017-01-22 PROCEDURE — 84484 ASSAY OF TROPONIN QUANT: CPT | Performed by: INTERNAL MEDICINE

## 2017-01-22 PROCEDURE — 96374 THER/PROPH/DIAG INJ IV PUSH: CPT

## 2017-01-22 PROCEDURE — 93005 ELECTROCARDIOGRAM TRACING: CPT

## 2017-01-22 PROCEDURE — 99284 EMERGENCY DEPT VISIT MOD MDM: CPT | Mod: 25

## 2017-01-22 PROCEDURE — 83605 ASSAY OF LACTIC ACID: CPT | Performed by: INTERNAL MEDICINE

## 2017-01-22 RX ORDER — ONDANSETRON 4 MG/1
4 TABLET, ORALLY DISINTEGRATING ORAL EVERY 8 HOURS PRN
Qty: 10 TABLET | Refills: 0 | Status: SHIPPED | OUTPATIENT
Start: 2017-01-22 | End: 2017-01-25

## 2017-01-22 RX ORDER — SODIUM CHLORIDE 9 MG/ML
1000 INJECTION, SOLUTION INTRAVENOUS CONTINUOUS
Status: DISCONTINUED | OUTPATIENT
Start: 2017-01-22 | End: 2017-01-22 | Stop reason: HOSPADM

## 2017-01-22 RX ORDER — ONDANSETRON 2 MG/ML
4 INJECTION INTRAMUSCULAR; INTRAVENOUS ONCE
Status: COMPLETED | OUTPATIENT
Start: 2017-01-22 | End: 2017-01-22

## 2017-01-22 RX ORDER — CEFUROXIME AXETIL 500 MG/1
500 TABLET ORAL 2 TIMES DAILY
Qty: 20 TABLET | Refills: 0 | Status: SHIPPED | OUTPATIENT
Start: 2017-01-22 | End: 2017-02-03

## 2017-01-22 RX ADMIN — ONDANSETRON 4 MG: 2 INJECTION INTRAMUSCULAR; INTRAVENOUS at 19:53

## 2017-01-22 RX ADMIN — SODIUM CHLORIDE 1000 ML: 9 INJECTION, SOLUTION INTRAVENOUS at 19:36

## 2017-01-22 ASSESSMENT — ENCOUNTER SYMPTOMS
CONSTIPATION: 1
HEADACHES: 1
DIZZINESS: 1
SHORTNESS OF BREATH: 1
NAUSEA: 1
ABDOMINAL PAIN: 1
VOMITING: 0
COUGH: 0
FATIGUE: 1
FEVER: 0

## 2017-01-22 NOTE — ED AVS SNAPSHOT
[facility] Emergency Department    [address]    [facility] [address]    Phone:  [phone]    Fax:  [phone]                                       Nena Tang   MRN: [id number]    Department:  [facility] Emergency Department   Date of Visit:  1/22/2017           After Visit Summary Signature Page     I have received my discharge instructions, and my questions have been answered. I have discussed any challenges I see with this plan with the nurse or doctor.    ..........................................................................................................................................  Patient/Patient Representative Signature      ..........................................................................................................................................  Patient Representative Print Name and Relationship to Patient    ..................................................               ................................................  Date                                            Time    ..........................................................................................................................................  Reviewed by Signature/Title    ...................................................              ..............................................  Date                                                            Time

## 2017-01-22 NOTE — TELEPHONE ENCOUNTER
"Call Type: Triage Call    Presenting Problem: \"Patient has had Abdominal pain, today it is  worse, she's not eating, and /63\" and severely dizzy and may  faint.  Triage Note:  Guideline Title: Abdominal or Pelvic Pain  Recommended Disposition: Activate   Original Inclination: Wanted to speak with a nurse  Override Disposition:  Intended Action: Call 911  Physician Contacted: No  New or worsening signs and symptoms that may indicate shock ?  YES  Physician Instructions:  Care Advice: IMMEDIATE ACTION  An adult should stay with the patient, preferably one trained in CPR. If  the person is not trained in CPR, then he or she should provide hands-only  (compression-only) CPR as recommended by the [employer] Association.  "

## 2017-01-22 NOTE — ED AVS SNAPSHOT
[facility] Emergency Department    [address]    [facility] [address]    Phone:  [phone]    Fax:  [phone]                                       Nena Tang   MRN: [id number]    Department:  [facility] Emergency Department   Date of Visit:  1/22/2017           Patient Information     Date Of Birth          1960        Your diagnoses for this visit were:     Urinary tract infection, site unspecified     Nausea        You were seen by Wendy Daly MD.      Follow-up Information     Follow up with Clinic, [facility] In 3 days.    Specialty:  Clinic    Contact information:    [address] [facility] [address]  [facility] [address] [phone]          Discharge Instructions       Discharge Instructions  Urinary Tract Infection  You have urinary tract infection, or UTI. The urinary tract includes the kidneys (which make urine), ureters (the tubes that carry urine from the kidneys to the bladder), the bladder (which stores urine), and urethra (the tube that carries urine out of the bladder).  Urinary tract infections occur when bacteria travel up the urethra into the bladder. We suspect a UTI based on chemical and microscopic findings in your urine, but if there is a question about your findings, we will do a culture to see if bacteria grow. A urine culture takes several days. You should always follow-up with your primary physician to find out about results of your culture if one was done.   Return to the Emergency Department if:    You have severe back pain.    You are vomiting so that you can t take your medicine, or have signs of dehydration (such as urinating less than 3 times per day).    You have fever over 101.5 degrees F.    You have significant confusion or are very weak, or feel very ill.    Your child seems much more ill, won t wake up, won t respond right, or is crying for a long time and won t calm down.    Your child is showing signs of  dehydration, Signs of dehydration can be:  o Your infant has had no wet diapers in 4-5 hours.  o Your older child has not passed urine in 6-8 hours.  o Your infant or child starts to have dry mouth and lips, or no saliva or tears.    Follow-up with your doctor:     Children under 24 months need to be seen by their regular doctor within one week after a diagnosis of a UTI. It may be necessary to do some imaging tests to look at the child s kidney or bladder.    You should begin to feel better within 24 - 48 hours of starting your antibiotic.  If you do not, you need to be seen again.      Treatment:     You will be treated with an antibiotic to kill the bacteria. We have to make an educated guess as to which antibiotic will work for your infection. In most healthy people, we can guess right almost all of the time. Sometimes a culture is done to show which antibiotics will work. This usually takes 2-3 days. When the culture is done, we may have to contact you to put you on a different antibiotic.    Take a pain medication such as Tylenol  (acetaminophen), Advil  (ibuprofen), Nuprin  (ibuprofen), or Aleve  (naproxen). If you have been given a narcotic such as Vicodin  (hydrocodone with acetaminophen), Percocet  (oxycodone with acetaminophen), or codeine, do not drive for four hours after you have taken it. If the narcotic contains Tylenol  (acetaminophen), do not take Tylenol  with it. All narcotics will cause constipation, so eat a high fiber diet.      Pyridium  (phenazopyridine) or Uristat  (phenazopyridine) is a prescription medication that numbs the bladder to reduce the burning pain of some UTIs.  The same medication is available in a non-prescription version called Azo-Standard  (phenazopyridine), Urodol  (phenazopyridine), or other brand names. This medication will change the color of the urine and tears (usually blue or orange). If you wear contacts, do not wear them while taking this medication as they may be  "stained by the medication.    Antibiotic Warning:     If you have been placed on antibiotics - watch for signs of allergic reaction.  These include rash, lip swelling, difficulty breathing, wheezing, and dizziness.  If you develop any of these symptoms, stop the antibiotic immediately and go to an emergency room or urgent care for evaluation.    Probiotics: If you have been given an antibiotic, you may want to also take a probiotic pill or eat yogurt with live cultures. Probiotics have \"good bacteria\" to help your intestines stay healthy. Studies have shown that probiotics help prevent diarrhea and other intestine problems (including C. diff infection) when you take antibiotics. You can buy these without a prescription in the pharmacy section of the store.   If you were given a prescription for medicine here today, be sure to read all of the information (including the package insert) that comes with your prescription.  This will include important information about the medicine, its side effects, and any warnings that you need to know about.  The pharmacist who fills the prescription can provide more information and answer questions you may have about the medicine.  If you have questions or concerns that the pharmacist cannot address, please call or return to the Emergency Department.   Remember that you can always come back to the Emergency Department if you are not able to see your regular doctor in the amount of time listed above, if you get any new symptoms, or if there is anything that worries you.      Future Appointments        Provider Department Dept Phone Center    1/23/2017 12:30 PM Tiburcio Chacon MD [facility] [phone] [facility] CLIN    1/23/2017 1:00 PM ADULT  DAY 4C [facility] [phone] [place]    1/24/2017 1:00 PM ADULT  DAY 4C [facility] [phone] [place]    1/26/2017 1:00 PM ADULT  DAY 4C [facility] [phone] [place]    1/27/2017 9:30 AM William Eng MD [facility] [phone]  SLEEP    1/30/2017 1:00 PM ADULT MH DAY 4C [facility] [phone] [place]    1/31/2017 1:00 PM ADULT MH DAY 4C [facility] [phone] [place]    2/2/2017 1:00 PM ADULT MH DAY 4C [facility] [phone] [place]    2/3/2017 10:30 AM BIN Mondragon [facility] [phone] [facility]    2/3/2017 10:30 AM ART Bernal CNP [facility] [phone] [facility]    2/3/2017 11:30 AM Kenzie Sheehan [facility] [phone] [facility]    2/6/2017 1:00 PM ADULT MH DAY 4C [facility] [phone] [place]    2/7/2017 1:00 PM ADULT MH DAY 4C [facility] [phone] [place]    2/9/2017 1:00 PM ADULT MH DAY 4C [facility] [phone] [place]    2/13/2017 1:00 PM ADULT MH DAY 4C [facility] [phone] [place]    2/14/2017 1:00 PM ADULT MH DAY 4C [facility] [phone] [place]    2/16/2017 1:00 PM ADULT MH DAY 4C [facility] [phone] [place]    2/20/2017 1:00 PM ADULT MH DAY 4C [facility] [phone] [place]    2/21/2017 1:00 PM ADULT MH DAY 4C [facility] [phone] [place]    2/23/2017 1:00 PM ADULT MH DAY 4C [facility] [phone] [place]    2/27/2017 1:00 PM ADULT MH DAY 4C [facility] [phone] [place]    2/28/2017 1:00 PM ADULT MH DAY 4C [facility] [phone] [place]    3/2/2017 1:00 PM ADULT  DAY 4C [facility] [phone] [place]    3/6/2017 1:00 PM ADULT  DAY 4C [facility] [phone] [place]    3/7/2017 1:00 PM ADULT  DAY 4C [facility] [phone] [place]    3/9/2017 1:00 PM ADULT  DAY 4C [facility] [phone] [place]      24 Hour Appointment Hotline       To make an appointment at any [facility] clinic, call [phone] ([phone]). If you don't have a family doctor or clinic, we will help you find one. [facility] clinics are conveniently located to serve the needs of you and your family.             Review of your medicines      START taking        Dose / Directions Last dose taken    cefUROXime 500 MG tablet   Commonly known as:  CEFTIN   Dose:  500 mg   Quantity:  20 tablet        Take 1 tablet (500 mg) by mouth 2 times daily   Refills:  0        ondansetron 4 MG ODT tab   Commonly known as:  ZOFRAN ODT   Dose:  4 mg   Quantity:  10 tablet        Take 1 tablet (4 mg) by mouth every 8 hours as needed   Refills:  0          Our records show that you are taking the medicines listed below. If these are incorrect, please call your family doctor or clinic.        Dose / Directions Last dose taken    acetaminophen 500 MG tablet   Commonly known as:  TYLENOL   Dose:  1000 mg   Quantity:  100 tablet        Take 2 tablets (1,000 mg) by mouth every 6 hours as needed for pain   Refills:  0        aspirin 81 MG EC tablet   Commonly known as:  ASPIRIN LOW DOSE   Dose:  81 mg   Quantity:  100 tablet        Take 1 tablet (81 mg) by mouth daily   Refills:  4        atorvastatin 80 MG tablet   Commonly known as:  LIPITOR   Dose:  80 mg   Quantity:  30 tablet        Take 1 tablet (80 mg) by mouth daily   Refills:  2        benztropine 1 MG tablet   Commonly known as:  COGENTIN   Dose:  1 mg   Quantity:  60 tablet        Take 1 tablet (1 mg) by mouth 2 times daily   Refills:  3        blood glucose monitoring lancets   Quantity:  1 Box        Use to test blood sugar 2 times daily or as directed.  Ok to substitute  alternative if insurance prefers.   Refills:  prn        blood glucose monitoring test strip   Commonly known as:  ONE TOUCH ULTRA   Quantity:  1 Box        Check blood sugar 2 times a day as directed, One Touch Ultra Blue Test Strips Please match with Pt's Insurance and meter.   Refills:  11        cholecalciferol 58883 UNITS capsule   Commonly known as:  VITAMIN D3   Dose:  1 capsule   Quantity:  7 capsule        Take 1 capsule (50,000 Units) by mouth once a week FOR LOW VITAMIN D   Refills:  0        citalopram 20 MG tablet   Commonly known as:  celeXA   Dose:  20 mg   Quantity:  30 tablet        Take 1 tablet (20 mg) by mouth daily   Refills:  2        dulaglutide 0.75 MG/0.5ML pen   Commonly known as:  TRULICITY   Dose:  0.75 mg   Quantity:  2 mL        Inject 0.75 mg Subcutaneous every 7 days   Refills:  0        gabapentin 300 MG capsule   Commonly known as:  NEURONTIN   Dose:  600 mg   Quantity:  180 capsule        Take 2 capsules (600 mg) by mouth 3 times daily   Refills:  2        glipiZIDE 5 MG 24 hr tablet   Commonly known as:  glipiZIDE XL   Dose:  5 mg   Quantity:  30 tablet        Take 1 tablet (5 mg) by mouth daily   Refills:  5        glucose 4 G Chew chewable tablet   Quantity:  20 tablet        Take 2 every 15 minutes for blood sugar <70mg/dL. Recheck blood sugar every 15 minutes until above 70mg/dL, then eat a substantial meal.   Refills:  1        haloperidol 5 MG tablet   Commonly known as:  HALDOL   Dose:  5 mg   Quantity:  30 tablet        Take 1 tablet (5 mg) by mouth At Bedtime   Refills:  2        ibuprofen 600 MG tablet   Commonly known as:  ADVIL/MOTRIN   Dose:  600 mg   Quantity:  60 tablet        Take 1 tablet (600 mg) by mouth every 4 hours as needed for moderate pain   Refills:  0        insulin aspart 100 UNIT/ML injection   Commonly known as:  NovoLOG PEN   Dose:  20 Units   Quantity:  1 Month        Inject 20 Units Subcutaneous 3 times daily (with meals) Inject an extra 7 units  once daily for blood sugars over 500mg/dL. Call the clinic if recheck is over 500mg/dL.   Refills:  1        insulin glargine 100 UNIT/ML injection   Commonly known as:  LANTUS   Dose:  45 Units   Quantity:  12 mL        Inject 45 Units Subcutaneous At Bedtime   Refills:  1        insulin pen needle 31G X 6 MM   Quantity:  100 each        Use 4 daily or as directed.   Refills:  prn        losartan 100 MG tablet   Commonly known as:  COZAAR   Dose:  100 mg   Quantity:  30 tablet        Take 1 tablet (100 mg) by mouth daily   Refills:  2        melatonin 5 MG Caps   Dose:  1 capsule   Quantity:  90 capsule        Take 1 capsule by mouth daily At dinnertime   Refills:  1        metoprolol 100 MG 24 hr tablet   Commonly known as:  TOPROL-XL   Dose:  100 mg   Quantity:  30 tablet        Take 1 tablet (100 mg) by mouth daily   Refills:  5        omeprazole 40 MG capsule   Commonly known as:  priLOSEC   Dose:  40 mg   Quantity:  30 capsule        Take 1 capsule (40 mg) by mouth daily   Refills:  5        * order for DME   Quantity:  1 Device        Equipment being ordered: Rolling walker   Refills:  0        * order for DME   Quantity:  1 each        Hold Novolog if meals are refused.   Refills:  0        senna-docusate 8.6-50 MG per tablet   Commonly known as:  SENOKOT-S;PERICOLACE   Dose:  1 tablet   Quantity:  100 tablet        Take 1 tablet by mouth 2 times daily as needed for constipation   Refills:  1        * Notice:  This list has 2 medication(s) that are the same as other medications prescribed for you. Read the directions carefully, and ask your doctor or other care provider to review them with you.            Prescriptions were sent or printed at these locations (2 Prescriptions)                   Other Prescriptions                Printed at Department/Unit printer (2 of 2)         cefUROXime (CEFTIN) 500 MG tablet               ondansetron (ZOFRAN ODT) 4 MG ODT tab                Procedures and tests performed  during your visit     CBC with platelets differential    Cardiac Continuous Monitoring    Comprehensive metabolic panel    EKG 12-lead, tracing only    Lactic acid whole blood    Lipase    Pulse oximetry nursing    Troponin I    UA with Microscopic      Orders Needing Specimen Collection     None      Pending Results     Date and Time Order Name Status Description    1/22/2017 1900 EKG 12-lead, tracing only Preliminary             Pending Culture Results     No orders found from 1/21/2017 to 1/23/2017.       Test Results from your hospital stay           1/22/2017  8:01 PM - Interface, Flexilab Results      Component Results     Component Value Ref Range & Units Status    WBC 6.9 4.0 - 11.0 10e9/L Final    RBC Count 3.59 (L) 3.8 - 5.2 10e12/L Final    Hemoglobin 11.0 (L) 11.7 - 15.7 g/dL Final    Hematocrit 33.4 (L) 35.0 - 47.0 % Final    MCV 93 78 - 100 fl Final    MCH 30.6 26.5 - 33.0 pg Final    MCHC 32.9 31.5 - 36.5 g/dL Final    RDW 13.0 10.0 - 15.0 % Final    Platelet Count 217 150 - 450 10e9/L Final    Diff Method Automated Method  Final    % Neutrophils 44.4 % Final    % Lymphocytes 44.2 % Final    % Monocytes 8.7 % Final    % Eosinophils 1.4 % Final    % Basophils 0.6 % Final    % Immature Granulocytes 0.7 % Final    Nucleated RBCs 0 0 /100 Final    Absolute Neutrophil 3.1 1.6 - 8.3 10e9/L Final    Absolute Lymphocytes 3.1 0.8 - 5.3 10e9/L Final    Absolute Monocytes 0.6 0.0 - 1.3 10e9/L Final    Absolute Eosinophils 0.1 0.0 - 0.7 10e9/L Final    Absolute Basophils 0.0 0.0 - 0.2 10e9/L Final    Abs Immature Granulocytes 0.1 0 - 0.4 10e9/L Final    Absolute Nucleated RBC 0.0  Final         1/22/2017  8:18 PM - Interface, Flexilab Results      Component Results     Component Value Ref Range & Units Status    Sodium 139 133 - 144 mmol/L Final    Potassium 4.1 3.4 - 5.3 mmol/L Final    Chloride 104 94 - 109 mmol/L Final    Carbon Dioxide 26 20 - 32 mmol/L Final    Anion Gap 9 3 - 14 mmol/L Final    Glucose 125  (H) 70 - 99 mg/dL Final    Urea Nitrogen 17 7 - 30 mg/dL Final    Creatinine 0.68 0.52 - 1.04 mg/dL Final    GFR Estimate 90 >60 mL/min/1.7m2 Final    Non  GFR Calc    GFR Estimate If Black >90   GFR Calc   >60 mL/min/1.7m2 Final    Calcium 9.2 8.5 - 10.1 mg/dL Final    Bilirubin Total 0.3 0.2 - 1.3 mg/dL Final    Albumin 3.4 3.4 - 5.0 g/dL Final    Protein Total 7.9 6.8 - 8.8 g/dL Final    Alkaline Phosphatase 116 40 - 150 U/L Final    ALT 25 0 - 50 U/L Final    AST 14 0 - 45 U/L Final         1/22/2017  7:59 PM - Interface, Flexilab Results      Component Results     Component Value Ref Range & Units Status    Lactic Acid 1.1 0.7 - 2.1 mmol/L Final         1/22/2017  8:18 PM - Interface, Flexilab Results      Component Results     Component Value Ref Range & Units Status    Lipase 175 73 - 393 U/L Final         1/22/2017  8:18 PM - Interface, Flexilab Results      Component Results     Component Value Ref Range & Units Status    Troponin I ES  0.000 - 0.045 ug/L Final    <0.015  The 99th percentile for upper reference range is 0.045 ug/L.  Troponin values in   the range of 0.045 - 0.120 ug/L may be associated with risks of adverse   clinical events.           1/22/2017  9:12 PM - Interface, Flexilab Results      Component Results     Component Value Ref Range & Units Status    Color Urine Light Yellow  Final    Appearance Urine Clear  Final    Glucose Urine Negative NEG mg/dL Final    Bilirubin Urine Negative NEG Final    Ketones Urine Negative NEG mg/dL Final    Specific Gravity Urine 1.010 1.003 - 1.035 Final    Blood Urine Negative NEG Final    pH Urine 5.5 5.0 - 7.0 pH Final    Protein Albumin Urine Negative NEG mg/dL Final    Urobilinogen mg/dL Normal 0.0 - 2.0 mg/dL Final    Nitrite Urine Negative NEG Final    Leukocyte Esterase Urine Large (A) NEG Final    Source Midstream Urine  Final    WBC Urine 12 (H) 0 - 2 /HPF Final    RBC Urine 2 0 - 2 /HPF Final    Bacteria Urine Few  (A) NEG /HPF Final    Squamous Epithelial /HPF Urine 1 0 - 1 /HPF Final    Mucous Urine Present (A) NEG /LPF Final                Clinical Quality Measure: Blood Pressure Screening     Your blood pressure was checked while you were in the emergency department today. The last reading we obtained was  BP: 120/81 mmHg . Please read the guidelines below about what these numbers mean and what you should do about them.  If your systolic blood pressure (the top number) is less than 120 and your diastolic blood pressure (the bottom number) is less than 80, then your blood pressure is normal. There is nothing more that you need to do about it.  If your systolic blood pressure (the top number) is 120-139 or your diastolic blood pressure (the bottom number) is 80-89, your blood pressure may be higher than it should be. You should have your blood pressure rechecked within a year by a primary care provider.  If your systolic blood pressure (the top number) is 140 or greater or your diastolic blood pressure (the bottom number) is 90 or greater, you may have high blood pressure. High blood pressure is treatable, but if left untreated over time it can put you at risk for heart attack, stroke, or kidney failure. You should have your blood pressure rechecked by a primary care provider within the next 4 weeks.  If your provider in the emergency department today gave you specific instructions to follow-up with your doctor or provider even sooner than that, you should follow that instruction and not wait for up to 4 weeks for your follow-up visit.        Thank you for choosing [facility]       Thank you for choosing [facility] for your care. Our goal is always to provide you with excellent care. Hearing back from our patients is one way we can continue to improve our services. Please take a few minutes to complete the written survey that you may receive in the mail after you visit with us. Thank you!        MyChart Information     [facility]  "lets you send messages to your doctor, view your test results, renew your prescriptions, schedule appointments and more. To sign up, go to [email or web address] . Click on \"Log in\" on the left side of the screen, which will take you to the Welcome page. Then click on \"Sign up Now\" on the right side of the page.     You will be asked to enter the access code listed below, as well as some personal information. Please follow the directions to create your username and password.     Your access code is: [email or web address]  Expires: 2017  3:41 PM     Your access code will  in 90 days. If you need help or a new code, please call your [facility] clinic or [phone].        Care EveryWhere ID     This is your Care EveryWhere ID. This could be used by other organizations to access your [facility] medical records  [id number]        After Visit Summary       This is your record. Keep this with you and show to your community pharmacist(s) and doctor(s) at your next visit.                  "

## 2017-01-23 ENCOUNTER — OFFICE VISIT (OUTPATIENT)
Dept: OPHTHALMOLOGY | Facility: CLINIC | Age: 57
End: 2017-01-23
Attending: OPHTHALMOLOGY
Payer: MEDICARE

## 2017-01-23 ENCOUNTER — TELEPHONE (OUTPATIENT)
Dept: FAMILY MEDICINE | Facility: CLINIC | Age: 57
End: 2017-01-23

## 2017-01-23 ENCOUNTER — CARE COORDINATION (OUTPATIENT)
Dept: CARE COORDINATION | Facility: CLINIC | Age: 57
End: 2017-01-23

## 2017-01-23 DIAGNOSIS — H52.12 MYOPIA, LEFT EYE: Primary | ICD-10-CM

## 2017-01-23 DIAGNOSIS — H26.9 CATARACTS, BILATERAL: ICD-10-CM

## 2017-01-23 DIAGNOSIS — E11.3299 TYPE 2 DIABETES MELLITUS WITH MILD NONPROLIFERATIVE DIABETIC RETINOPATHY WITHOUT MACULAR EDEMA (H): ICD-10-CM

## 2017-01-23 LAB — INTERPRETATION ECG - MUSE: NORMAL

## 2017-01-23 PROCEDURE — 92134 CPTRZ OPH DX IMG PST SGM RTA: CPT | Mod: ZF | Performed by: OPHTHALMOLOGY

## 2017-01-23 PROCEDURE — 99212 OFFICE O/P EST SF 10 MIN: CPT | Mod: 25,ZF

## 2017-01-23 ASSESSMENT — VISUAL ACUITY
METHOD: SNELLEN - LINEAR
OS_PH_SC: 20/200
OD_SC: 20/40
OD_PH_SC: 20/30-
OS_SC: 20/400

## 2017-01-23 ASSESSMENT — CUP TO DISC RATIO
OS_RATIO: 0.3
OD_RATIO: 0.3

## 2017-01-23 ASSESSMENT — TONOMETRY
OS_IOP_MMHG: 22
IOP_METHOD: TONOPEN
OD_IOP_MMHG: 20

## 2017-01-23 ASSESSMENT — EXTERNAL EXAM - LEFT EYE: OS_EXAM: NORMAL

## 2017-01-23 ASSESSMENT — SLIT LAMP EXAM - LIDS
COMMENTS: NORMAL
COMMENTS: NORMAL

## 2017-01-23 ASSESSMENT — CONF VISUAL FIELD
OD_NORMAL: 1
OS_NORMAL: 1

## 2017-01-23 ASSESSMENT — EXTERNAL EXAM - RIGHT EYE: OD_EXAM: NORMAL

## 2017-01-23 NOTE — TELEPHONE ENCOUNTER
"Call Type: Triage Call    Presenting Problem: \"My name is Alva (caregiver at [facility]in [place]) and I need to know if I should  give pt. her Novolog 20 units tonight, with her Lantus 45 units, at  bedtime.\" Alva says that pt. missed her 5 PM dose of her Novolog 20  units, because pt. was in the ER with a UTI. Alva says that pt's  blood sugar = 176 @ 10:42 PM tonight. RN then called the [facility]  answering service and the [facility] page . This  called Dr. Bar Newman- [facility]-on his cell phone. RN was connected with this   and told the  about Alva's question, about pt's insulin. Dr. Bar Newman says that pt. is to get 0 units of Novolog Insulin  tonight, but can get her Lantus Insulin 45 units at bedtime. RN then  called Alva back and told her what Dr. Bar Newman said. lAva voiced  understanding and had no further questions.  Triage Note:  Guideline Title: No Guideline - Advice Per Reference (Adult)  Recommended Disposition: Call Provider Immediately  Original Inclination: Wanted to speak with a nurse  Override Disposition:  Intended Action: Call PCP/HCP  Physician Contacted: No  CALL PROVIDER IMMEDIATELY ?  YES  SEE ED IMMEDIATELY ? NO  ACTIVATE  ? NO  Physician Instructions:  Care Advice:  "

## 2017-01-23 NOTE — DISCHARGE INSTRUCTIONS
Discharge Instructions  Urinary Tract Infection  You have urinary tract infection, or UTI. The urinary tract includes the kidneys (which make urine), ureters (the tubes that carry urine from the kidneys to the bladder), the bladder (which stores urine), and urethra (the tube that carries urine out of the bladder).  Urinary tract infections occur when bacteria travel up the urethra into the bladder. We suspect a UTI based on chemical and microscopic findings in your urine, but if there is a question about your findings, we will do a culture to see if bacteria grow. A urine culture takes several days. You should always follow-up with your primary physician to find out about results of your culture if one was done.   Return to the Emergency Department if:    You have severe back pain.    You are vomiting so that you can t take your medicine, or have signs of dehydration (such as urinating less than 3 times per day).    You have fever over 101.5 degrees F.    You have significant confusion or are very weak, or feel very ill.    Your child seems much more ill, won t wake up, won t respond right, or is crying for a long time and won t calm down.    Your child is showing signs of dehydration, Signs of dehydration can be:  o Your infant has had no wet diapers in 4-5 hours.  o Your older child has not passed urine in 6-8 hours.  o Your infant or child starts to have dry mouth and lips, or no saliva or tears.    Follow-up with your doctor:     Children under 24 months need to be seen by their regular doctor within one week after a diagnosis of a UTI. It may be necessary to do some imaging tests to look at the child s kidney or bladder.    You should begin to feel better within 24 - 48 hours of starting your antibiotic.  If you do not, you need to be seen again.      Treatment:     You will be treated with an antibiotic to kill the bacteria. We have to make an educated guess as to which antibiotic will work for your infection.  "In most healthy people, we can guess right almost all of the time. Sometimes a culture is done to show which antibiotics will work. This usually takes 2-3 days. When the culture is done, we may have to contact you to put you on a different antibiotic.    Take a pain medication such as Tylenol  (acetaminophen), Advil  (ibuprofen), Nuprin  (ibuprofen), or Aleve  (naproxen). If you have been given a narcotic such as Vicodin  (hydrocodone with acetaminophen), Percocet  (oxycodone with acetaminophen), or codeine, do not drive for four hours after you have taken it. If the narcotic contains Tylenol  (acetaminophen), do not take Tylenol  with it. All narcotics will cause constipation, so eat a high fiber diet.      Pyridium  (phenazopyridine) or Uristat  (phenazopyridine) is a prescription medication that numbs the bladder to reduce the burning pain of some UTIs.  The same medication is available in a non-prescription version called Azo-Standard  (phenazopyridine), Urodol  (phenazopyridine), or other brand names. This medication will change the color of the urine and tears (usually blue or orange). If you wear contacts, do not wear them while taking this medication as they may be stained by the medication.    Antibiotic Warning:     If you have been placed on antibiotics - watch for signs of allergic reaction.  These include rash, lip swelling, difficulty breathing, wheezing, and dizziness.  If you develop any of these symptoms, stop the antibiotic immediately and go to an emergency room or urgent care for evaluation.    Probiotics: If you have been given an antibiotic, you may want to also take a probiotic pill or eat yogurt with live cultures. Probiotics have \"good bacteria\" to help your intestines stay healthy. Studies have shown that probiotics help prevent diarrhea and other intestine problems (including C. diff infection) when you take antibiotics. You can buy these without a prescription in the pharmacy section of " the store.   If you were given a prescription for medicine here today, be sure to read all of the information (including the package insert) that comes with your prescription.  This will include important information about the medicine, its side effects, and any warnings that you need to know about.  The pharmacist who fills the prescription can provide more information and answer questions you may have about the medicine.  If you have questions or concerns that the pharmacist cannot address, please call or return to the Emergency Department.   Remember that you can always come back to the Emergency Department if you are not able to see your regular doctor in the amount of time listed above, if you get any new symptoms, or if there is anything that worries you.

## 2017-01-23 NOTE — ED PROVIDER NOTES
History     Chief Complaint:  Dizziness    HPI   Nena Tang is a 56 year old female with a history of diabetes, hypertension and hyperlipidemia status post cholecystectomy, hysterectomy and oophorectomy who presents to the ED from her group home for evaluation of dizziness. The patient reports that this morning, she had an upset stomach with nausea. Because of this, she has not eaten or had anything to drink all day, though has not vomited. She has chronic constipation, and that has not changed. She also notes dizziness, headache and fatigue. On further questioning, she notes a burning pain all across her upper abdomen and anterior chest, and feels somewhat short of breath. She reports that she has had similar symptoms once before and was seen by a physician, but does not remember the outcome. She denies any fever, cough, vomiting or ill contacts.       Allergies:  Imidazole Antifungals - Hives  Ketoprofen - Pruritis  Lisinopril - Hives  Metformin  Metronidazole  - Hives    Medications:    Dulaglutide  Metoprolol  Insulin aspart  Insulin glargine  Celexa  Cholecalciferol  Glipizide  Lipitor  Gabapentin  Haldol  Omeprazole  Cogentin  Losartan  Senokot  Aspirin    Past Medical History:    Esophageal reflux  IBS  Obesity  Uterine cancer  Type 2 Diabetes  Hyperlipidemia  Depression  Osteopenia  Vitamin D deficiency  Schizoaffective disorder  Migraine  Obstructive sleep apnea  Chronic low back pain  Hypertension   CAD  Takotsubo cardiomyopathy    Past Surgical History:    Hysterectomy  Coronary CTA  Release trigger finger  Oophorectomy  Cholecystectomy     Family History:    Mother - Bladder cancer, COPD, Cirrhosis, Alcohol/Drug abuse, Hypertension, Hyperlipidemia, CAD, Glaucoma    Social History:  Lives in group home  Negative tobacco use  Positive alcohol use  Marital Status:         Review of Systems   Constitutional: Positive for fatigue. Negative for fever.   Respiratory: Positive for shortness of  breath. Negative for cough.    Cardiovascular: Positive for chest pain.   Gastrointestinal: Positive for nausea, abdominal pain and constipation. Negative for vomiting.   Neurological: Positive for dizziness and headaches.   All other systems reviewed and are negative.      Physical Exam     Patient Vitals for the past 24 hrs:   BP Temp Temp src Pulse Heart Rate Resp SpO2   01/22/17 1954 120/81 mmHg - - 76 - 20 92 %   01/22/17 1925 - - - - 76 - 94 %   01/22/17 1915 138/87 mmHg - - - - - 96 %   01/22/17 1914 139/87 mmHg - - - - - -   01/22/17 1713 121/82 mmHg 98.6  F (37  C) Oral 79 - 18 97 %       Physical Exam   Constitutional: She is cooperative.   HENT:   Right Ear: Tympanic membrane normal.   Left Ear: Tympanic membrane normal.   Mouth/Throat: Oropharynx is clear and moist and mucous membranes are normal.   Eyes: Conjunctivae are normal.   Neck: Normal range of motion.   Cardiovascular: Regular rhythm and normal heart sounds.    Pulmonary/Chest: Effort normal and breath sounds normal.   Abdominal: Soft. Normal appearance and bowel sounds are normal. There is tenderness in the right upper quadrant, epigastric area and left upper quadrant. There is no rebound and no guarding.   Musculoskeletal: Normal range of motion.   Lymphadenopathy:     She has no cervical adenopathy.   Neurological: She is alert.   Skin: Skin is warm and dry.   Psychiatric: Her speech is normal. She expresses no suicidal ideation.       Emergency Department Course   ECG:  Indication: chest pain  Time: 19:11  Vent. Rate 75 bpm. NV interval 198. QRS duration 88. QT/QTc 410/457. P-R-T axis 41 -43 -11. Normal sinus rhythm. Left axis deviation. Abnormal ECG. Read time: 1919 Agree with computer interpretation. No significant changes compared to EKG dated 12/29/16.    Laboratory:  CBC: HGB - 11.0 (L) o/w WNL (WBC - 6.9, PLT - 217)  CMP: Glucose - 125 (H) o/w WNL (Creat - 0.68)  Lactic acid whole blood: 1.1  Lipase: 175  Troponin: <0.015    UA:  Leukocyte esterase - Large (A), WBC/HPF - 12 (H), Bacteria - Few (A), Mucous urine - Present (A)  o/w Negative  Urine culture: Pending    Interventions:  1936 IV 0.9% sodium chloride BOLUS 1000 mL  1953 IV Zofran 4 mg    Emergency Department Course:  Nursing notes and vitals reviewed. I performed an exam of the patient as documented above.    EKG obtained in the ED, see results above.     Blood drawn. This was sent to the lab for further testing, results above.    2030 On reevaluation, the patient is feeling much improved and able to drink fluids.    The patient provided a urine sample here in the emergency department. This was sent for laboratory testing, findings above.     Findings and plan explained to the Patient. Patient discharged home with instructions regarding supportive care, medications, and reasons to return. The importance of close follow-up was reviewed.     I personally reviewed the laboratory results with the Patient and answered all related questions prior to discharge.       Impression & Plan      Medical Decision Making:  Nena Tang is a 56 year old female who presents with nausea, lightheadedness, and fatigue. There was report from the nurse of hypotension at her group home, though this was not documented here. Here, she has been normotensive. Her laboratory tests are generally reassuring. She has mild anemia, which is typical for her. There is no evidence of significant dehydration with no acidosis or electrolyte derangement. Serum lactate is normal. She complained of a burning pain in the epigastrium radiating to the chest. Because of this, I obtained EKG, which showed no significant change from previous, as well as troponin which is negative. Given that her symptoms have been going on for greater than 8 hours, I think this effectively rules out ACS. Here she was given saline and Zofran. With this, she was feeling markedly improved and was able to take fluids without difficulty. Her  urine returns showing large leukocyte esterase and elevated white blood cells suggestive of possible UTI. Urine cultures were ordered and are pending. I think given the patient s symptoms, it would be reasonable to start antibiotics pending culture. I will discharge the patient back to her group home with Ceftin as directed, Zofran as needed for nausea, clear liquids advancing as tolerated. Follow up with primary care in 3 days and return if worse or new symptoms.      Diagnosis:    ICD-10-CM    1. Urinary tract infection, site unspecified N39.0    2. Nausea R11.0        Disposition:  discharged to home    Discharge Medications:  New Prescriptions    CEFUROXIME (CEFTIN) 500 MG TABLET    Take 1 tablet (500 mg) by mouth 2 times daily    ONDANSETRON (ZOFRAN ODT) 4 MG ODT TAB    Take 1 tablet (4 mg) by mouth every 8 hours as needed     I, Kenzie Washington, am serving as a scribe on 1/22/2017 at 6:53 PM to personally document services performed by Wendy Daly MD based on my observations and the provider's statements to me.      Kenzie Washington  1/22/2017   [facility] EMERGENCY DEPARTMENT        Wendy Daly MD  01/23/17 0112

## 2017-01-23 NOTE — MR AVS SNAPSHOT
After Visit Summary   1/23/2017    Nena Tang    MRN: [id number]           Patient Information     Date Of Birth          1960        Visit Information        Provider Department      1/23/2017 12:30 PM [facility]urc[facility]        Today's Diagnoses     Type 2 diabetes mellitus with mild nonproliferative diabetic retinopathy without macular edema (H)            Follow-ups after your visit        Follow-up notes from your care team     Return in about 4 months (around 5/23/2017) for Exam & OCT OU.      Your next 10 appointments already scheduled     Jan 24, 2017  1:00 PM   Treatment with ADULT MH DAY 4C   [facility] ([facility])    [facility] [address] [address]   [phone]            Jan 26, 2017  1:00 PM   Treatment with ADULT MH DAY 4C   [facility] ([facility])    [facility] [address] [address]   [phone]            Jan 27, 2017  9:30 AM   Return Sleep Patient with William Eng MD   [facility] ([facility])    [address]  [facility] [address]   [phone]            Jan 30, 2017  1:00 PM   Treatment with ADULT MH DAY 4C   [facility] ([facility])    [facility] [address] [address]   [phone]            Jan 31, 2017  1:00 PM   Treatment with ADULT MH DAY 4C   [facility] ([facility])    [address] [address]   [phone]            Feb 02, 2017  1:00 PM   Treatment with ADULT MH DAY 4C   [facility] ([facility])    [address] [address]   [phone]             Feb 03, 2017 10:30 AM   Return Visit with ART Mims CNP   [facility] ([facility]    [address]  [address]  [facility] [id number] [phone]            Feb 03, 2017 10:30 AM   Return Visit with BIN Mondragon   [facility] ([facility])    [address]  [address] [place] [id number] [phone]            Feb 03, 2017 11:30 AM   SHORT with Kenzie Sheehan   [facility] ([facility])    [address] [place] [address]  [address] [place] [id number] [phone]            Feb 06, 2017  1:00 PM   Treatment with ADULT MH DAY 4C   [facility] ([facility])    [facility] [address] [id number] [phone]              Who to contact     Please call your clinic at [phone] to:    Ask questions about your health    Make or cancel appointments    Discuss your medicines    Learn about your test results    Speak to your doctor   If you have compliments or concerns about an experience at your clinic, or if you wish to file a complaint, please contact [facility] Patient Relations at [phone] or email us at [email or web address]         Additional Information About Your Visit        An[employer] Information     Meizu is an electronic gateway that provides easy, online access to your medical records. With Meizu, you can request a clinic appointment, read your test results, renew a prescription or communicate with your care team.     To sign up for Meizu visit the website at [email or web address]   You will be asked to enter the access code listed below, as well as some personal information. Please follow the directions to create your username and password.      Your access code is: [email or web address]  Expires: 2017  3:41 PM     Your access code will  in 90 days. If you need help or a new code, please contact your [facility] or call [phone] for assistance.        Care EveryWhere ID     This is your Care EveryWhere ID. This could be used by other organizations to access your [place] medical records  [id number]        Your Vitals Were     Last Period                   2015            Blood Pressure from Last 3 Encounters:   17 161/100   17 134/82   16 162/81    Weight from Last 3 Encounters:   17 99.338 kg (219 lb)   17 99.338 kg (219 lb)   16 99.428 kg (219 lb 3.2 oz)              We Performed the Following     OCT Retina Spectralis OU (both eyes)          Today's Medication Changes          These changes are accurate as of: 17  1:59 PM.  If you have any questions, ask your nurse or doctor.               These medicines have changed or have updated prescriptions.        Dose/Directions    gabapentin 300 MG capsule   Commonly known as:  NEURONTIN   This may have changed:  when to take this   Used for:  Type 2 diabetes mellitus with diabetic neuropathy, with long-term current use of insulin (H)        Dose:  600 mg   Take 2 capsules (600 mg) by mouth 3 times daily   Quantity:  180 capsule   Refills:  2                Primary Care Provider Office Phone # Fax #    [facility] [phone]       [address] [address]        Goals        Patient-Stated    Medical     Goal Comments - Note created  2016  9:15 AM by Chelsea Pulido, RN    Get blood sugars under control    Improve medication compliance    As of today's date 2016 goal is met at 0 - 25%.   Goal Status:  Active          Thank you!     Thank you for choosing [facility]  for your care. Our goal is always to provide you with excellent care. Hearing back from our  patients is one way we can continue to improve our services. Please take a few minutes to complete the written survey that you may receive in the mail after your visit with us. Thank you!             Your Updated Medication List - Protect others around you: Learn how to safely use, store and throw away your medicines at www.disposemymeds.org.          This list is accurate as of: 1/23/17  1:59 PM.  Always use your most recent med list.                   Brand Name Dispense Instructions for use    acetaminophen 500 MG tablet    TYLENOL    100 tablet    Take 2 tablets (1,000 mg) by mouth every 6 hours as needed for pain       aspirin 81 MG EC tablet    ASPIRIN LOW DOSE    100 tablet    Take 1 tablet (81 mg) by mouth daily       atorvastatin 80 MG tablet    LIPITOR    30 tablet    Take 1 tablet (80 mg) by mouth daily       benztropine 1 MG tablet    COGENTIN    60 tablet    Take 1 tablet (1 mg) by mouth 2 times daily       blood glucose monitoring lancets     1 Box    Use to test blood sugar 2 times daily or as directed.  Ok to substitute alternative if insurance prefers.       blood glucose monitoring test strip    ONE TOUCH ULTRA    1 Box    Check blood sugar 2 times a day as directed, One Touch Ultra Blue Test Strips Please match with Pt's Insurance and meter.       cefUROXime 500 MG tablet    CEFTIN    20 tablet    Take 1 tablet (500 mg) by mouth 2 times daily       cholecalciferol 91129 UNITS capsule    VITAMIN D3    7 capsule    Take 1 capsule (50,000 Units) by mouth once a week FOR LOW VITAMIN D       citalopram 20 MG tablet    celeXA    30 tablet    Take 1 tablet (20 mg) by mouth daily       dulaglutide 0.75 MG/0.5ML pen    TRULICITY    2 mL    Inject 0.75 mg Subcutaneous every 7 days       gabapentin 300 MG capsule    NEURONTIN    180 capsule    Take 2 capsules (600 mg) by mouth 3 times daily       glipiZIDE 5 MG 24 hr tablet    glipiZIDE XL    30 tablet    Take 1 tablet (5 mg) by mouth daily       glucose 4 G  Chew chewable tablet     20 tablet    Take 2 every 15 minutes for blood sugar <70mg/dL. Recheck blood sugar every 15 minutes until above 70mg/dL, then eat a substantial meal.       haloperidol 5 MG tablet    HALDOL    30 tablet    Take 1 tablet (5 mg) by mouth At Bedtime       ibuprofen 600 MG tablet    ADVIL/MOTRIN    60 tablet    Take 1 tablet (600 mg) by mouth every 4 hours as needed for moderate pain       insulin aspart 100 UNIT/ML injection    NovoLOG PEN    1 Month    Inject 20 Units Subcutaneous 3 times daily (with meals) Inject an extra 7 units once daily for blood sugars over 500mg/dL. Call the clinic if recheck is over 500mg/dL.       insulin glargine 100 UNIT/ML injection    LANTUS    12 mL    Inject 45 Units Subcutaneous At Bedtime       insulin pen needle 31G X 6 MM     100 each    Use 4 daily or as directed.       losartan 100 MG tablet    COZAAR    30 tablet    Take 1 tablet (100 mg) by mouth daily       melatonin 5 MG Caps     90 capsule    Take 1 capsule by mouth daily At dinnertime       metoprolol 100 MG 24 hr tablet    TOPROL-XL    30 tablet    Take 1 tablet (100 mg) by mouth daily       omeprazole 40 MG capsule    priLOSEC    30 capsule    Take 1 capsule (40 mg) by mouth daily       ondansetron 4 MG ODT tab    ZOFRAN ODT    10 tablet    Take 1 tablet (4 mg) by mouth every 8 hours as needed       * order for DME     1 Device    Equipment being ordered: Rolling walker       * order for DME     1 each    Hold Novolog if meals are refused.       senna-docusate 8.6-50 MG per tablet    SENOKOT-S;PERICOLACE    100 tablet    Take 1 tablet by mouth 2 times daily as needed for constipation       * Notice:  This list has 2 medication(s) that are the same as other medications prescribed for you. Read the directions carefully, and ask your doctor or other care provider to review them with you.

## 2017-01-23 NOTE — TELEPHONE ENCOUNTER
Called Alva with [facility] and she states that patient received two doses of Vitamin D3 53407 units last week in error, caregiver wondering if pt should receive the weekly dose this week. Huddled with provider Antionette Campo, approved for patient to receive this weeks dose. Called Alva and informed.  Rina Norris RN

## 2017-01-23 NOTE — PROGRESS NOTES
I have confirmed the patient's CC, HPI and reviewed Past Medical History, Past Surgical History, Social History, Family History, Problem List, Medication List and agree with Tech note.    CC: blurry vision     HPI: 57YO F Hx of DMII here for diabetic exam. Last eye exam 2 years ago. DMII x 12 years. On oral and insulin. Last hgA1c 9.7 in November of 2016.     OCT today shows no Clinical significant macular edema however there is a small amount of Diabetic macular edema outside the fovea <1dd in the right eye      Assessment/plan   1.  DMII: severe NPDR   - last HgA1c 9.7   - emphasized importance of good blood glucose/blood pressure control . Discussed possibility of insulin pump to be discussed with PCP    2. Myopia OS with secondary amblyopia              Does not wear glasses normally     3.  Posterior subcapsular cataract (PSC) cataract both eyes    -Was previously scheduled for cataract surgery OS with Dr Diaz- canceled due to illness   -A1C too high right now   -RTC 3 months with OCT OU- if diabetes control improved, consider surgery with Dr Diaz at that time      Kyle Trent MD  PGY3, Dept of Ophthalmology      ATTESTATION:     I have seen and examined the patient with Dr. Trent and agree with the findings in this note    Tiburcio Martin MD PhD.  Professor & Chair

## 2017-01-23 NOTE — TELEPHONE ENCOUNTER
Clinic Action Needed: Please call back ASAP  to clarify .   Reason for Call:[facility] G[facility] Rupesh called clarification on vit D3  weekly dosage due today  because , left message that she receive a  Extra dose of Vit D3 on 1/17/17 .     Routed to:Sent to PCP's nurse pool.   Linh Harris RN, [facility]        ;

## 2017-01-23 NOTE — NURSING NOTE
Chief Complaints and History of Present Illnesses   Patient presents with     Follow Up For     6 month follow up DM     HPI    Symptoms:     Floaters (Comment: OLYA)   Flashes (Comment: OLYA, daily)   Glare (Comment: OLYA)   No Dryness   No itching            Comments:  Blood sugar 157 this AM  A1C 9, 1+ months ago  Unable to taco cataract surgery due to not having a ride, ready to make appt now  Kylah CEDENO 1:03 PM January 23, 2017

## 2017-01-24 ENCOUNTER — HOSPITAL ENCOUNTER (OUTPATIENT)
Dept: BEHAVIORAL HEALTH | Facility: CLINIC | Age: 57
End: 2017-01-24
Attending: PSYCHOLOGIST
Payer: MEDICARE

## 2017-01-24 ENCOUNTER — HOSPITAL ENCOUNTER (EMERGENCY)
Facility: CLINIC | Age: 57
Discharge: HOME | End: 2017-01-25
Attending: EMERGENCY MEDICINE | Admitting: EMERGENCY MEDICINE
Payer: MEDICARE

## 2017-01-24 ENCOUNTER — APPOINTMENT (OUTPATIENT)
Dept: GENERAL RADIOLOGY | Facility: CLINIC | Age: 57
End: 2017-01-24
Attending: EMERGENCY MEDICINE
Payer: MEDICARE

## 2017-01-24 DIAGNOSIS — R42 DIZZINESS: ICD-10-CM

## 2017-01-24 LAB
ALBUMIN SERPL-MCNC: 3.4 G/DL (ref 3.4–5)
ALBUMIN UR-MCNC: 10 MG/DL
ALP SERPL-CCNC: 109 U/L (ref 40–150)
ALT SERPL W P-5'-P-CCNC: 24 U/L (ref 0–50)
ANION GAP SERPL CALCULATED.3IONS-SCNC: 7 MMOL/L (ref 3–14)
APPEARANCE UR: ABNORMAL
AST SERPL W P-5'-P-CCNC: 22 U/L (ref 0–45)
BACTERIA #/AREA URNS HPF: ABNORMAL /HPF
BACTERIA SPEC CULT: NORMAL
BILIRUB SERPL-MCNC: 0.2 MG/DL (ref 0.2–1.3)
BILIRUB UR QL STRIP: NEGATIVE
BUN SERPL-MCNC: 16 MG/DL (ref 7–30)
CALCIUM SERPL-MCNC: 8.6 MG/DL (ref 8.5–10.1)
CHLORIDE SERPL-SCNC: 108 MMOL/L (ref 94–109)
CO2 SERPL-SCNC: 26 MMOL/L (ref 20–32)
COLOR UR AUTO: YELLOW
CREAT SERPL-MCNC: 0.94 MG/DL (ref 0.52–1.04)
ERYTHROCYTE [DISTWIDTH] IN BLOOD BY AUTOMATED COUNT: 13.2 % (ref 10–15)
GFR SERPL CREATININE-BSD FRML MDRD: 61 ML/MIN/1.7M2
GLUCOSE SERPL-MCNC: 93 MG/DL (ref 70–99)
GLUCOSE UR STRIP-MCNC: NEGATIVE MG/DL
HCT VFR BLD AUTO: 33 % (ref 35–47)
HGB BLD-MCNC: 10.7 G/DL (ref 11.7–15.7)
HGB UR QL STRIP: NEGATIVE
HYALINE CASTS #/AREA URNS LPF: 6 /LPF (ref 0–2)
KETONES UR STRIP-MCNC: NEGATIVE MG/DL
LACTATE BLD-SCNC: 1.5 MMOL/L (ref 0.7–2.1)
LEUKOCYTE ESTERASE UR QL STRIP: ABNORMAL
Lab: NORMAL
MCH RBC QN AUTO: 30.7 PG (ref 26.5–33)
MCHC RBC AUTO-ENTMCNC: 32.4 G/DL (ref 31.5–36.5)
MCV RBC AUTO: 95 FL (ref 78–100)
MICRO REPORT STATUS: NORMAL
MUCOUS THREADS #/AREA URNS LPF: PRESENT /LPF
NITRATE UR QL: NEGATIVE
PH UR STRIP: 6 PH (ref 5–7)
PLATELET # BLD AUTO: 223 10E9/L (ref 150–450)
POTASSIUM SERPL-SCNC: 4.6 MMOL/L (ref 3.4–5.3)
PROT SERPL-MCNC: 7.5 G/DL (ref 6.8–8.8)
RBC # BLD AUTO: 3.49 10E12/L (ref 3.8–5.2)
RBC #/AREA URNS AUTO: 2 /HPF (ref 0–2)
SODIUM SERPL-SCNC: 141 MMOL/L (ref 133–144)
SP GR UR STRIP: 1.01 (ref 1–1.03)
SPECIMEN SOURCE: NORMAL
SQUAMOUS #/AREA URNS AUTO: 6 /HPF (ref 0–1)
TRANS CELLS #/AREA URNS HPF: <1 /HPF (ref 0–1)
TROPONIN I SERPL-MCNC: NORMAL UG/L (ref 0–0.04)
URN SPEC COLLECT METH UR: ABNORMAL
UROBILINOGEN UR STRIP-MCNC: NORMAL MG/DL (ref 0–2)
WBC # BLD AUTO: 7.1 10E9/L (ref 4–11)
WBC #/AREA URNS AUTO: 33 /HPF (ref 0–2)

## 2017-01-24 PROCEDURE — A9270 NON-COVERED ITEM OR SERVICE: HCPCS | Mod: GY

## 2017-01-24 PROCEDURE — 25000128 H RX IP 250 OP 636: Performed by: EMERGENCY MEDICINE

## 2017-01-24 PROCEDURE — 87086 URINE CULTURE/COLONY COUNT: CPT | Performed by: EMERGENCY MEDICINE

## 2017-01-24 PROCEDURE — 96374 THER/PROPH/DIAG INJ IV PUSH: CPT

## 2017-01-24 PROCEDURE — H2012 BEHAV HLTH DAY TREAT, PER HR: HCPCS

## 2017-01-24 PROCEDURE — 96361 HYDRATE IV INFUSION ADD-ON: CPT

## 2017-01-24 PROCEDURE — 84484 ASSAY OF TROPONIN QUANT: CPT | Performed by: EMERGENCY MEDICINE

## 2017-01-24 PROCEDURE — 25000132 ZZH RX MED GY IP 250 OP 250 PS 637: Mod: GY

## 2017-01-24 PROCEDURE — 81001 URINALYSIS AUTO W/SCOPE: CPT | Performed by: EMERGENCY MEDICINE

## 2017-01-24 PROCEDURE — 99285 EMERGENCY DEPT VISIT HI MDM: CPT | Mod: 25

## 2017-01-24 PROCEDURE — 83605 ASSAY OF LACTIC ACID: CPT | Performed by: EMERGENCY MEDICINE

## 2017-01-24 PROCEDURE — 80053 COMPREHEN METABOLIC PANEL: CPT | Performed by: EMERGENCY MEDICINE

## 2017-01-24 PROCEDURE — 93005 ELECTROCARDIOGRAM TRACING: CPT

## 2017-01-24 PROCEDURE — 71020 XR CHEST 2 VW: CPT

## 2017-01-24 PROCEDURE — 85027 COMPLETE CBC AUTOMATED: CPT | Performed by: EMERGENCY MEDICINE

## 2017-01-24 RX ORDER — NITROGLYCERIN 0.4 MG/1
TABLET SUBLINGUAL
Status: COMPLETED
Start: 2017-01-24 | End: 2017-01-24

## 2017-01-24 RX ORDER — NITROGLYCERIN 0.4 MG/1
0.4 TABLET SUBLINGUAL EVERY 5 MIN PRN
Status: DISCONTINUED | OUTPATIENT
Start: 2017-01-24 | End: 2017-01-25 | Stop reason: HOSPADM

## 2017-01-24 RX ORDER — DIAZEPAM 10 MG/2ML
1 INJECTION, SOLUTION INTRAMUSCULAR; INTRAVENOUS ONCE
Status: COMPLETED | OUTPATIENT
Start: 2017-01-24 | End: 2017-01-24

## 2017-01-24 RX ADMIN — SODIUM CHLORIDE 1000 ML: 9 INJECTION, SOLUTION INTRAVENOUS at 20:05

## 2017-01-24 RX ADMIN — NITROGLYCERIN 0.4 MG: 0.4 TABLET SUBLINGUAL at 21:35

## 2017-01-24 RX ADMIN — SODIUM CHLORIDE 500 ML: 9 INJECTION, SOLUTION INTRAVENOUS at 23:18

## 2017-01-24 RX ADMIN — DIAZEPAM 1 MG: 5 INJECTION, SOLUTION INTRAMUSCULAR; INTRAVENOUS at 23:18

## 2017-01-24 NOTE — ED AVS SNAPSHOT
[facility] Emergency Department    [address]    [facility] [place] [address]    Phone:  [phone]    Fax:  [phone]                                       Nena Tang   MRN: [id number]    Department:  [facility] Emergency Department   Date of Visit:  1/24/2017           After Visit Summary Signature Page     I have received my discharge instructions, and my questions have been answered. I have discussed any challenges I see with this plan with the nurse or doctor.    ..........................................................................................................................................  Patient/Patient Representative Signature      ..........................................................................................................................................  Patient Representative Print Name and Relationship to Patient    ..................................................               ................................................  Date                                            Time    ..........................................................................................................................................  Reviewed by Signature/Title    ...................................................              ..............................................  Date                                                            Time

## 2017-01-24 NOTE — ED AVS SNAPSHOT
[facility] Emergency Department    [address]    [facility] [address]    Phone:  [phone]    Fax:  [phone]                                       Nena Tang   MRN: [id number]    Department:  [facility] Emergency Department   Date of Visit:  1/24/2017           Patient Information     Date Of Birth          1960        Your diagnoses for this visit were:     Dizziness        You were seen by Honorio Jeff MD.      Follow-up Information     Follow up with Jud Real PA-C. Schedule an appointment as soon as possible for a visit in 2 days.    Specialty:  Physician Assistant    Contact information:    [facility]  [address] [place] [address]  [facility] [id number] [phone]          Follow up with [facility] Emergency Department.    Specialty:  EMERGENCY MEDICINE    Why:  If symptoms worsen    Contact information:    [address] [facility] [address] [phone]        Discharge Instructions       Follow-up:  Please follow-up with your primary care provider in 2 days for re-evaluation and discussion of your visit to the emergency department today.    Home treatments:  Recommended home therapies include rest, fluids, continue with your previous medications, and close follow-up.    Return precautions:  Warning signs which should prompt you to return to the ER include worsening dizziness, nausea, pain, or any other new or troubling symptoms.  We are always happy to see you again.    Discharge Instructions  Dizziness (Lightheaded)  Today you were seen for dizziness.  Dizziness can be caused by many things.  At this time, your doctor has found no signs that your dizziness is due to a serious or life-threatening condition. However, sometimes there is a serious problem that does not show up right away, and it is important for you to follow up with your regular doctor as instructed.  The most common cause of  dizziness is called a vasovagal reaction. This is the kind of dizziness people get when they have their blood drawn or see unpleasant things. This can also happen with dehydration, extreme emotion, nausea, severe pain and also sometimes with urinating or coughing.  This type of dizziness is not serious. It is more common to be lightheaded when you are warm, when you have been standing still for a long time, when you haven t eaten or had very much to drink, and many other factors. Many times there are several things all going on together that caused your spell today. As you get older, your blood vessels get more stiff, and it is common to have dizziness, especially when you first stand up.  If you have been lying down, be sure to sit for a few minutes before standing up, and always sit right down if you feel faint.  Other causes of dizziness include heart disease, irregular heartbeat, side effects from medications, effects of drugs and alcohol, blood pressure changes, infections, and blood loss (anemia). Some of these causes can be serious and even life threatening. Be sure to follow your doctor s discharge instructions for follow up with other doctors to further evaluate your problem.      Return to the Emergency Department if:      You pass out (fainting or falling out), especially during exercise.      You develop chest pain, chest pressure or difficulty breathing.    Your feel an irregular heartbeat.    You have excessive vaginal bleeding, or blood in your stool or vomit.    You have a high fever.    Your symptoms get worse or more frequent.    If when you begin to feel dizzy, it is important to sit down or lay down immediately to prevent injury from falling.  If you were given a prescription for medicine here today, be sure to read all of the information (including the package insert) that comes with your prescription.  This will include important information about the medicine, its side effects, and any  warnings that you need to know about.  The pharmacist who fills the prescription can provide more information and answer questions you may have about the medicine.  If you have questions or concerns that the pharmacist cannot address, please call or return to the Emergency Department.     Opioid Medication Information    Pain medications are among the most commonly prescribed medicines, so we are including this information for all our patients. If you did not receive pain medication or get a prescription for pain medicine, you can ignore it.     You may have been given a prescription for an opioid (narcotic) pain medicine and/or have received a pain medicine while here in the Emergency Department. These medicines can make you drowsy or impaired. You must not drive, operate dangerous equipment, or engage in any other dangerous activities while taking these medications. If you drive while taking these medications, you could be arrested for DUI, or driving under the influence. Do not drink any alcohol while you are taking these medications.     Opioid pain medications can cause addiction. If you have a history of chemical dependency of any type, you are at a higher risk of becoming addicted to pain medications.  Only take these prescribed medications to treat your pain when all other options have been tried. Take it for as short a time and as few doses as possible. Store your pain pills in a secure place, as they are frequently stolen and provide a dangerous opportunity for children or visitors in your house to start abusing these powerful medications. We will not replace any lost or stolen medicine.  As soon as your pain is better, you should flush all your remaining medication.     Many prescription pain medications contain Tylenol  (acetaminophen), including Vicodin , Tylenol #3 , Norco , Lortab , and Percocet .  You should not take any extra pills of Tylenol  if you are using these prescription medications or you  can get very sick.  Do not ever take more than 3000 mg of acetaminophen in any 24 hour period.    All opioids tend to cause constipation. Drink plenty of water and eat foods that have a lot of fiber, such as fruits, vegetables, prune juice, apple juice and high fiber cereal.  Take a laxative if you don t move your bowels at least every other day. Miralax , Milk of Magnesia, Colace , or Senna  can be used to keep you regular.      Remember that you can always come back to the Emergency Department if you are not able to see your regular doctor in the amount of time listed above, if you get any new symptoms, or if there is anything that worries you.                    Future Appointments        Provider Department Dept Phone Center    1/26/2017 1:00 PM ADULT MH DAY 4C [facility] [phone] [place]    1/27/2017 9:30 AM William Eng MD [facility] [phone] [facility]    1/30/2017 1:00 PM ADULT MH DAY 4C [facility] [phone] [place]    1/31/2017 1:00 PM ADULT MH DAY 4C [facility] [phone] [place]    2/2/2017 1:00 PM ADULT MH DAY 4C [facility] [phone] [place]    2/3/2017 10:30 AM BIN Mondragon [facility] [phone] [facility]    2/3/2017 10:30 AM ART Bernal CNP [facility] [phone] [facility]    2/3/2017 11:30 AM Kenzie Sheehan [facility] [phone] [facility]    2/6/2017 1:00 PM ADULT MH DAY 4C [facility] [phone] [place]    2/7/2017 1:00 PM ADULT MH DAY 4C [facility] [phone] [place]    2/9/2017 1:00 PM ADULT MH DAY 4C [facility] [phone] [place]    2/13/2017 1:00 PM ADULT MH DAY 4C [facility] [phone] [place]    2/14/2017 1:00 PM ADULT  DAY  4C [facility] [phone] [place]    2/16/2017 1:00 PM ADULT MH DAY 4C [facility] [phone] [place]    2/20/2017 1:00 PM ADULT MH DAY 4C [facility] [phone] [place]    2/21/2017 1:00 PM ADULT MH DAY 4C [facility] [phone] [place]    2/23/2017 1:00 PM ADULT MH DAY 4C [facility] [phone] [place]    2/27/2017 1:00 PM ADULT MH DAY 4C [facility] [phone] [place]    2/28/2017 1:00 PM ADULT MH DAY 4C [facility] [phone] [place]    3/2/2017 1:00 PM ADULT MH DAY 4C [facility] [phone] [place]    3/6/2017 1:00 PM ADULT MH DAY 4C [facility] [phone] [place]    3/7/2017 1:00 PM ADULT MH DAY 4C [facility] [phone] [place]    3/9/2017 1:00 PM ADULT  DAY 4C [facility] [phone] [place]    5/25/2017 10:15 AM Tiburcio Chacon [facility] [phone] [facility]      24 Hour Appointment Hotline       To make an appointment at any [facility], call [phone] ([phone]). If you don't have a family doctor or clinic, we will help you find one. [facility] are conveniently located to serve the needs of you and your family.             Review of your medicines      Our records show that you are taking the medicines listed below. If these are incorrect, please call your family doctor or clinic.        Dose / Directions Last dose taken    acetaminophen 500 MG tablet   Commonly known as:  TYLENOL   Dose:  1000 mg   Quantity:  100 tablet        Take 2 tablets (1,000 mg) by mouth every 6 hours as needed for pain   Refills:  0        aspirin 81 MG EC tablet   Commonly known as:  ASPIRIN LOW DOSE   Dose:  81 mg   Quantity:  100 tablet        Take 1 tablet (81 mg) by mouth daily    Refills:  4        atorvastatin 80 MG tablet   Commonly known as:  LIPITOR   Dose:  80 mg   Quantity:  30 tablet        Take 1 tablet (80 mg) by mouth daily   Refills:  2        benztropine 1 MG tablet   Commonly known as:  COGENTIN   Dose:  1 mg   Quantity:  60 tablet        Take 1 tablet (1 mg) by mouth 2 times daily   Refills:  3        blood glucose monitoring lancets   Quantity:  1 Box        Use to test blood sugar 2 times daily or as directed.  Ok to substitute alternative if insurance prefers.   Refills:  prn        blood glucose monitoring test strip   Commonly known as:  ONE TOUCH ULTRA   Quantity:  1 Box        Check blood sugar 2 times a day as directed, One Touch Ultra Blue Test Strips Please match with Pt's Insurance and meter.   Refills:  11        cefUROXime 500 MG tablet   Commonly known as:  CEFTIN   Dose:  500 mg   Quantity:  20 tablet        Take 1 tablet (500 mg) by mouth 2 times daily   Refills:  0        cholecalciferol 94700 UNITS capsule   Commonly known as:  VITAMIN D3   Dose:  1 capsule   Quantity:  7 capsule        Take 1 capsule (50,000 Units) by mouth once a week FOR LOW VITAMIN D   Refills:  0        citalopram 20 MG tablet   Commonly known as:  celeXA   Dose:  20 mg   Quantity:  30 tablet        Take 1 tablet (20 mg) by mouth daily   Refills:  2        dulaglutide 0.75 MG/0.5ML pen   Commonly known as:  TRULICITY   Dose:  0.75 mg   Quantity:  2 mL        Inject 0.75 mg Subcutaneous every 7 days   Refills:  0        gabapentin 300 MG capsule   Commonly known as:  NEURONTIN   Dose:  600 mg   Quantity:  180 capsule        Take 2 capsules (600 mg) by mouth 3 times daily   Refills:  2        glipiZIDE 5 MG 24 hr tablet   Commonly known as:  glipiZIDE XL   Dose:  5 mg   Quantity:  30 tablet        Take 1 tablet (5 mg) by mouth daily   Refills:  5        glucose 4 G Chew chewable tablet   Quantity:  20 tablet        Take 2 every 15 minutes for blood sugar <70mg/dL. Recheck blood sugar  every 15 minutes until above 70mg/dL, then eat a substantial meal.   Refills:  1        haloperidol 5 MG tablet   Commonly known as:  HALDOL   Dose:  5 mg   Quantity:  30 tablet        Take 1 tablet (5 mg) by mouth At Bedtime   Refills:  2        ibuprofen 600 MG tablet   Commonly known as:  ADVIL/MOTRIN   Dose:  600 mg   Quantity:  60 tablet        Take 1 tablet (600 mg) by mouth every 4 hours as needed for moderate pain   Refills:  0        insulin aspart 100 UNIT/ML injection   Commonly known as:  NovoLOG PEN   Dose:  20 Units   Quantity:  1 Month        Inject 20 Units Subcutaneous 3 times daily (with meals) Inject an extra 7 units once daily for blood sugars over 500mg/dL. Call the clinic if recheck is over 500mg/dL.   Refills:  1        insulin glargine 100 UNIT/ML injection   Commonly known as:  LANTUS   Dose:  45 Units   Quantity:  12 mL        Inject 45 Units Subcutaneous At Bedtime   Refills:  1        insulin pen needle 31G X 6 MM   Quantity:  100 each        Use 4 daily or as directed.   Refills:  prn        losartan 100 MG tablet   Commonly known as:  COZAAR   Dose:  100 mg   Quantity:  30 tablet        Take 1 tablet (100 mg) by mouth daily   Refills:  2        melatonin 5 MG Caps   Dose:  1 capsule   Quantity:  90 capsule        Take 1 capsule by mouth daily At dinnertime   Refills:  1        metoprolol 100 MG 24 hr tablet   Commonly known as:  TOPROL-XL   Dose:  100 mg   Quantity:  30 tablet        Take 1 tablet (100 mg) by mouth daily   Refills:  5        omeprazole 40 MG capsule   Commonly known as:  priLOSEC   Dose:  40 mg   Quantity:  30 capsule        Take 1 capsule (40 mg) by mouth daily   Refills:  5        ondansetron 4 MG ODT tab   Commonly known as:  ZOFRAN ODT   Dose:  4 mg   Quantity:  10 tablet        Take 1 tablet (4 mg) by mouth every 8 hours as needed   Refills:  0        * order for DME   Quantity:  1 Device        Equipment being ordered: Rolling walker   Refills:  0        * order  for DME   Quantity:  1 each        Hold Novolog if meals are refused.   Refills:  0        senna-docusate 8.6-50 MG per tablet   Commonly known as:  SENOKOT-S;PERICOLACE   Dose:  1 tablet   Quantity:  100 tablet        Take 1 tablet by mouth 2 times daily as needed for constipation   Refills:  1        * Notice:  This list has 2 medication(s) that are the same as other medications prescribed for you. Read the directions carefully, and ask your doctor or other care provider to review them with you.            Procedures and tests performed during your visit     CBC (platelets, no diff)    Chest XR,  PA & LAT    Comprehensive metabolic panel    EKG 12 lead    Lactic acid whole blood    Troponin I (now)    UA with Microscopic    Urine Culture      Orders Needing Specimen Collection     None      Pending Results     Date and Time Order Name Status Description    1/24/2017 2155 Urine Culture In process     1/24/2017 2001 Chest XR,  PA & LAT Preliminary     1/24/2017 1956 EKG 12 lead Preliminary             Pending Culture Results     Date and Time Order Name Status Description    1/24/2017 2155 Urine Culture In process        Test Results from your hospital stay           1/24/2017  8:12 PM - Interface, Flexilab Results      Component Results     Component Value Ref Range & Units Status    WBC 7.1 4.0 - 11.0 10e9/L Final    RBC Count 3.49 (L) 3.8 - 5.2 10e12/L Final    Hemoglobin 10.7 (L) 11.7 - 15.7 g/dL Final    Hematocrit 33.0 (L) 35.0 - 47.0 % Final    MCV 95 78 - 100 fl Final    MCH 30.7 26.5 - 33.0 pg Final    MCHC 32.4 31.5 - 36.5 g/dL Final    RDW 13.2 10.0 - 15.0 % Final    Platelet Count 223 150 - 450 10e9/L Final         1/24/2017  8:35 PM - Interface, Flexilab Results      Component Results     Component Value Ref Range & Units Status    Sodium 141 133 - 144 mmol/L Final    Potassium 4.6 3.4 - 5.3 mmol/L Final    Chloride 108 94 - 109 mmol/L Final    Carbon Dioxide 26 20 - 32 mmol/L Final    Anion Gap 7 3 -  14 mmol/L Final    Glucose 93 70 - 99 mg/dL Final    Urea Nitrogen 16 7 - 30 mg/dL Final    Creatinine 0.94 0.52 - 1.04 mg/dL Final    GFR Estimate 61 >60 mL/min/1.7m2 Final    Non  GFR Calc    GFR Estimate If Black 74 >60 mL/min/1.7m2 Final    African American GFR Calc    Calcium 8.6 8.5 - 10.1 mg/dL Final    Bilirubin Total 0.2 0.2 - 1.3 mg/dL Final    Albumin 3.4 3.4 - 5.0 g/dL Final    Protein Total 7.5 6.8 - 8.8 g/dL Final    Alkaline Phosphatase 109 40 - 150 U/L Final    ALT 24 0 - 50 U/L Final    AST 22 0 - 45 U/L Final         1/24/2017  8:33 PM - Interface, Flexilab Results      Component Results     Component Value Ref Range & Units Status    Lactic Acid 1.5 0.7 - 2.1 mmol/L Final         1/24/2017  9:30 PM - Interface, Flexilab Results      Component Results     Component Value Ref Range & Units Status    Color Urine Yellow  Final    Appearance Urine Slightly Cloudy  Final    Glucose Urine Negative NEG mg/dL Final    Bilirubin Urine Negative NEG Final    Ketones Urine Negative NEG mg/dL Final    Specific Gravity Urine 1.014 1.003 - 1.035 Final    Blood Urine Negative NEG Final    pH Urine 6.0 5.0 - 7.0 pH Final    Protein Albumin Urine 10 (A) NEG mg/dL Final    Urobilinogen mg/dL Normal 0.0 - 2.0 mg/dL Final    Nitrite Urine Negative NEG Final    Leukocyte Esterase Urine Large (A) NEG Final    Source Midstream Urine  Final    WBC Urine 33 (H) 0 - 2 /HPF Final    RBC Urine 2 0 - 2 /HPF Final    Bacteria Urine Moderate (A) NEG /HPF Final    Squamous Epithelial /HPF Urine 6 (H) 0 - 1 /HPF Final    Transitional Epi <1 0 - 1 /HPF Final    Mucous Urine Present (A) NEG /LPF Final    Hyaline Casts 6 (H) 0 - 2 /LPF Final         1/24/2017  8:32 PM - Interface, Flexilab Results      Component Results     Component Value Ref Range & Units Status    Troponin I ES  0.000 - 0.045 ug/L Final    <0.015  The 99th percentile for upper reference range is 0.045 ug/L.  Troponin values in   the range of 0.045 -  0.120 ug/L may be associated with risks of adverse   clinical events.           1/24/2017  8:40 PM - Interface, Radiant Ib      Narrative     CHEST TWO VIEWS   1/24/2017 8:32 PM     HISTORY: Chest discomfort.    COMPARISON: 11/26/2016    FINDINGS: The lungs are clear. Mild to moderate enlargement of the  cardiopericardial silhouette again noted.        Impression     IMPRESSION: No convincing evidence of acute cardiopulmonary disease.         1/24/2017 10:00 PM - Interface, Flexilab Results                Clinical Quality Measure: Blood Pressure Screening     Your blood pressure was checked while you were in the emergency department today. The last reading we obtained was  BP: 137/76 mmHg . Please read the guidelines below about what these numbers mean and what you should do about them.  If your systolic blood pressure (the top number) is less than 120 and your diastolic blood pressure (the bottom number) is less than 80, then your blood pressure is normal. There is nothing more that you need to do about it.  If your systolic blood pressure (the top number) is 120-139 or your diastolic blood pressure (the bottom number) is 80-89, your blood pressure may be higher than it should be. You should have your blood pressure rechecked within a year by a primary care provider.  If your systolic blood pressure (the top number) is 140 or greater or your diastolic blood pressure (the bottom number) is 90 or greater, you may have high blood pressure. High blood pressure is treatable, but if left untreated over time it can put you at risk for heart attack, stroke, or kidney failure. You should have your blood pressure rechecked by a primary care provider within the next 4 weeks.  If your provider in the emergency department today gave you specific instructions to follow-up with your doctor or provider even sooner than that, you should follow that instruction and not wait for up to 4 weeks for your follow-up visit.        Thank  "you for choosing [facility]       Thank you for choosing [facility] for your care. Our goal is always to provide you with excellent care. Hearing back from our patients is one way we can continue to improve our services. Please take a few minutes to complete the written survey that you may receive in the mail after you visit with us. Thank you!        [employer] Information     [facility] lets you send messages to your doctor, view your test results, renew your prescriptions, schedule appointments and more. To sign up, go to [email or web address] . Click on \"Log in\" on the left side of the screen, which will take you to the Welcome page. Then click on \"Sign up Now\" on the right side of the page.     You will be asked to enter the access code listed below, as well as some personal information. Please follow the directions to create your username and password.     Your access code is: [email or web address]  Expires: 2017  3:41 PM     Your access code will  in 90 days. If you need help or a new code, please call your [facility] clinic or [phone].        Care EveryWhere ID     This is your Care EveryWhere ID. This could be used by other organizations to access your [facility] medical records  [id number]        After Visit Summary       This is your record. Keep this with you and show to your community pharmacist(s) and doctor(s) at your next visit.                  "

## 2017-01-24 NOTE — PROGRESS NOTES
Group Therapy Progress Notes     Area of Treatment Focus:  Symptom Management, Personal Safety, Community Resources/Discharge Planning, Abstinence/Relapse Prevention, Develop/Improve Independent Living/Socialization Skills/Interpersonal Relationships and Cultural/Racial/Health and Spiritual    Therapeutic Interventions/Treatment Strategies:  Support, Redirection, Feedback, Limit/Boundaries, Safety Assessments, Structured Activity, Problem Solving, Clarification, Education, Motivational Enhancement Therapy, Cognitive Behavioral Therapy and Structured Activity    Response to Treatment Strategies:  Accepted Feedback, Gave Feedback, Listened, Focused on Goals, Attentive, Accepted Support, Alert, Demonstrates Behavior Change    Name of Group:  [facility]     Progress Note   Nena reported being safe today.  The group discussed different areas of their lives, and how they are able to care for themselves, get out in the community, and their feelings for the past few days.     The following was reported:  Sleep:    Poor, and she is getting her C-Pap machine fixed.         Medication:  As prescribed  Concentration: lower  Appetite:  Ok, hungry  Interest level and activities: She said that she had a bladder infection and was in the hospital on Saturday, due to dehydration.     She reported feeling better, now that she got her anti-biotic. She said that she had low blood pressure, also.    Social contacts: Staff and her kids  Feelings:   She reported feeling relieved to get her medicine and that she was resting at home.      Psychosis:  She reported a decrease in symptoms  Skills used:  Resting and taking her medications.     Mental Health Management   The group participated in a discussion about negative thoughts.  The group talked about different ways to cope with it and the use of the 5 senses, plus 2 others.  The group worked on a structured activity to learn skills about facing reality  when dealing with psychosis.  The group discussion resulted in them learning a new skill to apply to their daily skills, and practiced a mindfulness exercise that involved watching a fire scene on a DVD.        Is this a Weekly Review of the Progress on the Treatment Plan?  Yes.      Are Treatment Plan Goals being addressed?  Yes, continue treatment goals      Are Treatment Plan Strategies to Address Goals Effective?  Yes, continue treatment strategies      Are there any current contracts in place?  No

## 2017-01-25 VITALS
DIASTOLIC BLOOD PRESSURE: 81 MMHG | OXYGEN SATURATION: 96 % | HEIGHT: 60 IN | SYSTOLIC BLOOD PRESSURE: 137 MMHG | HEART RATE: 86 BPM | BODY MASS INDEX: 41.23 KG/M2 | RESPIRATION RATE: 18 BRPM | WEIGHT: 210 LBS | TEMPERATURE: 98.5 F

## 2017-01-25 LAB — INTERPRETATION ECG - MUSE: NORMAL

## 2017-01-25 ASSESSMENT — ENCOUNTER SYMPTOMS
WEAKNESS: 0
DIARRHEA: 0
NAUSEA: 1
HEMATURIA: 0
FEVER: 0
DYSURIA: 0
COUGH: 1
VOMITING: 0
HEADACHES: 1
DIZZINESS: 1
NUMBNESS: 0

## 2017-01-25 NOTE — ED NOTES
Pt presents to ED from group home reporting she woke up today feeling dizzy, states she is being treated for a UTI, but states this dizziness is different. Group home reports BP in the 80's, . Pt also c/o chest pain that radiates down her right arm. ABCs intact. A/Ox3

## 2017-01-25 NOTE — ED PROVIDER NOTES
History     Chief Complaint:  Dizziness     HPI   Nena Tang is a 56 year old female, with an extensive past medical history, who presents via EMS with dizzinesss. The patient reports currently living in a group home where she states she is being treated for a UTI. The patient states this morning she woke up feeling very dizzy. Per EMS the group home reports a systolic BP in the 80's earlier today but the EMS reports blood pressure of 130s systolic and a blood glucose of 134. Here the patient endorses nausea, dizziness that worsens with position changes, dry cough, headache and decreased appetite. The patient also reports mild chest discomfort and upper abdominal that radiates into her right arm but states this has been there for the past 14 days and has been fairly constant.The patient denies any vomiting, diarrhea, dysuria, hematuria, new numbness or weakness, or fever. She has no thoughts of self harm nor suicide.  The patient does not voice any further concerns or complaints at this time.  On further discussion, patient notes she has been dealing with dizziness similar to this off and on for month.  Per chart review, patient was hospitalized at the end of December, where she too was noted to have dizziness.  At that time, it was felt to be peripheral in etiology, possibly viral, medication induced, vs vaso-vagal.  Pt does use a cane at baseline for assistance with ambulation.  The patient also saw an ophthalmologist yesterday who noted the patient to have severe non-proliferative diabetic retinopathy. Patient was seen and evaluated in this ED 2 days prior for evaluation of similar symptoms.  Work-up at that time was unremarkable aside from possible UTI, for which she was discharged home on Ceftin.    Allergies:  Imidazole Antifungals  Ketoprofen  Lisinopril  Metformin  Metronidazole     Medications:    cefUROXime (CEFTIN) 500 MG tablet   ondansetron (ZOFRAN ODT) 4 MG ODT tab   blood glucose monitoring  (ONE TOUCH ULTRA) test strip   dulaglutide (TRULICITY) 0.75 MG/0.5ML pen   metoprolol (TOPROL-XL) 100 MG 24 hr tablet   insulin aspart (NOVOLOG PEN) 100 UNIT/ML soln   acetaminophen (TYLENOL) 500 MG tablet   ibuprofen (ADVIL,MOTRIN) 600 MG tablet   melatonin 5 MG CAPS   glucose 4 G CHEW   order for DME   insulin glargine (LANTUS) 100 UNIT/ML PEN   insulin pen needle 31G X 6 MM   blood glucose monitoring (ONE TOUCH DELICA) lancets   citalopram (CELEXA) 20 MG tablet   cholecalciferol (VITAMIN D3) 90253 UNITS capsule   glipiZIDE (GLIPIZIDE XL) 5 MG 24 hr tablet   atorvastatin (LIPITOR) 80 MG tablet   gabapentin (NEURONTIN) 300 MG capsule   haloperidol (HALDOL) 5 MG tablet   omeprazole (PRILOSEC) 40 MG capsule   benztropine (COGENTIN) 1 MG tablet   order for DME   losartan (COZAAR) 100 MG tablet   senna-docusate (SENOKOT-S;PERICOLACE) 8.6-50 MG per tablet   aspirin (ASPIRIN LOW DOSE) 81 MG EC tablet      Past Medical History:    IBS   Obesity   Uterine cancer   DM type 2   Illiterate   Moderate to major depression   Hyperlipidemia   Osteopenia   Medicine non-compliance   Migraine   Vitamin D deficiency   Schizoaffective disorder   Fecal urgency   Vitamin D deficiency   Vitamin 12 deficiency   Migraine headache   CINDY   Chronic low back pain   HTN   Suicidal intent   Cocaine abuse   Heroin abuse   Takotsubo cardiomyopathy   CAD   Tardive dyskinesia     Past Surgical History:    Hysterectomy   Coronary CTA   Release trigger finger left thumb   Oophorectomy for malignancy   Laparoscopic cholecystectomy     Family History:    Bladder cancer   COPD   Cirrhosis of liver   HTN   CAD   Glaucoma  Colorectal cancer   HTN     Social History:  Marital Status:   [5]  Non-smoker   No smokeless tobacco use   Weekly alcohol use   Former heroin and cocaine abuse     Review of Systems   Constitutional: Negative for fever.   Respiratory: Positive for cough.    Cardiovascular: Positive for chest pain.   Gastrointestinal: Positive for  nausea. Negative for vomiting and diarrhea.   Genitourinary: Negative for dysuria and hematuria.   Neurological: Positive for dizziness and headaches. Negative for weakness and numbness.   Psychiatric/Behavioral: Negative for suicidal ideas.   All other systems reviewed and are negative.      Physical Exam   First Vitals:  BP: 133/80 mmHg  Pulse: 86  Temp: 98.5  F (36.9  C)  Resp: 18  Height: 152.4 cm (5')  Weight: 95.255 kg (210 lb)  SpO2: 98 %    Physical Exam  General:              Well-nourished              Speaking in full sentences              Answers questions appropriately  Eyes:              Conjunctiva without injection or scleral icterus              PERRL  ENT:              Moist mucous membranes              Posterior oropharynx clear without erythema or exudate              Nares patent              Pinnae normal  Neck:              Full ROM              No stiffness appreciated  Resp:              Lungs CTAB              No crackles, wheezing or audible rubs              Good air movement  CV:                    Normal rate, regular rhythm              S1 and S2 present              No murmur, gallop or rub  GI:              BS present              Abdomen soft without distention              Diffuse mild tenderness              No focal tenderness              No guarding or rebound tenderness  Skin:              Warm, dry, well perfused              No rashes or open wounds on exposed skin  MSK:              Moves all extremities              No focal deformities or swelling  Neuro:              Alert              CN III-XII              5/5  strength              5/5 ankle dorsi/plantarflexion              SILT in BUE and BLE              Answers questions appropriately              Moves all extremities equally   Gait stable   No ataxia  Psych:              Normal affect, normal mood       Emergency Department Course   ECG:  ECG (19:53:05):  Rate 84 bpm. NV interval 186. QRS duration 86.  QT/QTc 366/432. P-R-T axes 31 -41 -2. Normal sinus rhythm. Left axis deviation. Abnormal ECG. Interpreted at 1954 by Honorio Jeff MD.    Imaging:  Radiographic findings were communicated with the patient who voiced understanding of the findings.    Chest X-ray PA & Lateral   No convincing evidence of acute cardiopulmonary disease.  Reading per radiology     Laboratory:  Troponin: <0.015  Lactic Acid: 1.5  Comprehensive Metabolic Panel: WNL Cr 0.94, GFR 74   CBC:  (L), HGB 10.7 (L), HCT 33.0 o/w WNL WBC 7.1,    Urine Culture: pending   UA With Microscopic: Protein Albumin 10, Leukocyte Esterase large, WBC 33 (H)< Bacteria moderate, Squamous Epithelial 6 (H), Mucous present, Hyaline casts o/w WNL     Interventions:  2135: Nitrostat 0.4 mg sublingual   2318:  mL IV   2318:Valium 1 mg  IV     Emergency Department Course:  Past medical records, nursing notes, and vitals reviewed.  1953: I performed an exam of the patient and obtained history, as documented above.  An IV was inserted to administer the above interventions.   IV inserted and blood drawn.  The patient was sent for a x-ray while in the emergency department, findings above.  An EKG was obtained and can be read as above.   2128: I rechecked the patient who reported improvement in her dizziness.  2153: I rechecked the patient. The patient endorses dizziness when asked to ambulate. Findings and plan explained to the Patient. Patient discharged home with instructions regarding supportive care, medications, and reasons to return. The importance of close follow-up was reviewed.     Impression & Plan      Medical Decision Making:  Nena Tang is a pleasant 56 year old female, with a complex past medical history who presents to the ED for evaluation of dizziness. Her vital signs on presentation reveal elevated BP, though otherwise are unremarkable. A broad differential was considered, including but was not limited to underlying  infection, dehydration, medication side effect, CNS process, ACS, intraabdominal process, among others. History, exam and ED course as above. At present, the exact cause of patient's dizziness is not exactly clear. I am most suspicious for peripheral etiologies. In discussion with the patient and per chart review, dizziness has been an ongoing and recurrent issue for which she has been seen and evaluated previously, most recently during a hospital stay at the end of December. She was noted to have similar descriptions dizziness worsening with positional changes. Her neurologic exam is non-focal here and she is ambulatory with a steady, stable gait. She exhibits symmetric motor and sensory function to upper and lower extremities, as well as symmetric cranial nerve function.  At this time, I have low suspicion for central CNS process such as CVA/TIA/dissection, and feel further advanced imaging can be deferred safely. She was seen here 2 days prior for evaluation of similar symptoms where she was diagnosed with a UTI. Urine culture at this time demonstrates mixed timoteo without specific organism.  No other focal evidence of acute infectious process. Dehydration certainly a consideration given her exacerbation of symptoms with changes in position. She was provided IV fluids with notable improvement in symptoms. Labs reveal stable anemia, otherwise no gross metabolic derangements. She notes some associated epigastric/ chest discomfort which has been present for the past two weeks. EKG is without acute ischemic changes as compared with previous and Troponin is undetectable. I feel further evaluation for ACS can be safely deferred. Doubt PE (not tachycardia, not hypoxic).  Lactate is WNL at 1.5. On reevaluation, the patient was updated regarding the results of the above studies. Given her above work up, reassuring exam, stable vital signs I feel she is stable for discharge back to her group home. I recommended follow up  with PCP in 2 days, as well as neurology as an outpatient. The patient felt comfortable with this plan of care and questions were answered prior to discharge.     Diagnosis:    ICD-10-CM    1. Dizziness R42      Disposition:  Discharged to her group home in stable condition     Jennifer Jagjit  1/24/2017   [facility] EMERGENCY DEPARTMENT  I, Jennifer Danielson, marco serving as a scribe at 7:53 PM on 1/24/2017 to document services personally performed by Honorio Jeff MD based on my observations and the provider's statements to me.       Honorio Jeff MD  01/25/17 0156

## 2017-01-25 NOTE — PROGRESS NOTES
Group Therapy Progress Notes     Area of Treatment Focus:  Symptom Management    Therapeutic Interventions/Treatment Strategies:  Support, Feedback, Safety Assessments, Clarification Structured Activity and Education    Response to Treatment Strategies:  Accepted Feedback, Listened, Attentive, Accepted Support and Alert    Name of Group:  Life Skills    Progress Note  Client presented with calm,even mood.Good focus and concentration during a discussion related to communication with a focus on interpersonal style.Thought process clear,goal directed and reality based. Treatment goals not addressed.      Is this a Weekly Review of the Progress on the Treatment Plan?  Yes.      Are Treatment Plan Goals being addressed?  Yes, continue treatment goals      Are Treatment Plan Strategies to Address Goals Effective?  Yes, continue treatment strategies      Are there any current contracts in place?  No

## 2017-01-25 NOTE — ED NOTES
Bed: ED24  Expected date: 1/24/17  Expected time: 7:33 PM  Means of arrival: Ambulance  Comments:  BV1

## 2017-01-25 NOTE — DISCHARGE INSTRUCTIONS
Follow-up:  Please follow-up with your primary care provider in 2 days for re-evaluation and discussion of your visit to the emergency department today.    Home treatments:  Recommended home therapies include rest, fluids, continue with your previous medications, and close follow-up.    Return precautions:  Warning signs which should prompt you to return to the ER include worsening dizziness, nausea, pain, or any other new or troubling symptoms.  We are always happy to see you again.    Discharge Instructions  Dizziness (Lightheaded)  Today you were seen for dizziness.  Dizziness can be caused by many things.  At this time, your doctor has found no signs that your dizziness is due to a serious or life-threatening condition. However, sometimes there is a serious problem that does not show up right away, and it is important for you to follow up with your regular doctor as instructed.  The most common cause of dizziness is called a vasovagal reaction. This is the kind of dizziness people get when they have their blood drawn or see unpleasant things. This can also happen with dehydration, extreme emotion, nausea, severe pain and also sometimes with urinating or coughing.  This type of dizziness is not serious. It is more common to be lightheaded when you are warm, when you have been standing still for a long time, when you haven t eaten or had very much to drink, and many other factors. Many times there are several things all going on together that caused your spell today. As you get older, your blood vessels get more stiff, and it is common to have dizziness, especially when you first stand up.  If you have been lying down, be sure to sit for a few minutes before standing up, and always sit right down if you feel faint.  Other causes of dizziness include heart disease, irregular heartbeat, side effects from medications, effects of drugs and alcohol, blood pressure changes, infections, and blood loss (anemia). Some of  these causes can be serious and even life threatening. Be sure to follow your doctor s discharge instructions for follow up with other doctors to further evaluate your problem.      Return to the Emergency Department if:      You pass out (fainting or falling out), especially during exercise.      You develop chest pain, chest pressure or difficulty breathing.    Your feel an irregular heartbeat.    You have excessive vaginal bleeding, or blood in your stool or vomit.    You have a high fever.    Your symptoms get worse or more frequent.    If when you begin to feel dizzy, it is important to sit down or lay down immediately to prevent injury from falling.  If you were given a prescription for medicine here today, be sure to read all of the information (including the package insert) that comes with your prescription.  This will include important information about the medicine, its side effects, and any warnings that you need to know about.  The pharmacist who fills the prescription can provide more information and answer questions you may have about the medicine.  If you have questions or concerns that the pharmacist cannot address, please call or return to the Emergency Department.     Opioid Medication Information    Pain medications are among the most commonly prescribed medicines, so we are including this information for all our patients. If you did not receive pain medication or get a prescription for pain medicine, you can ignore it.     You may have been given a prescription for an opioid (narcotic) pain medicine and/or have received a pain medicine while here in the Emergency Department. These medicines can make you drowsy or impaired. You must not drive, operate dangerous equipment, or engage in any other dangerous activities while taking these medications. If you drive while taking these medications, you could be arrested for DUI, or driving under the influence. Do not drink any alcohol while you are taking  these medications.     Opioid pain medications can cause addiction. If you have a history of chemical dependency of any type, you are at a higher risk of becoming addicted to pain medications.  Only take these prescribed medications to treat your pain when all other options have been tried. Take it for as short a time and as few doses as possible. Store your pain pills in a secure place, as they are frequently stolen and provide a dangerous opportunity for children or visitors in your house to start abusing these powerful medications. We will not replace any lost or stolen medicine.  As soon as your pain is better, you should flush all your remaining medication.     Many prescription pain medications contain Tylenol  (acetaminophen), including Vicodin , Tylenol #3 , Norco , Lortab , and Percocet .  You should not take any extra pills of Tylenol  if you are using these prescription medications or you can get very sick.  Do not ever take more than 3000 mg of acetaminophen in any 24 hour period.    All opioids tend to cause constipation. Drink plenty of water and eat foods that have a lot of fiber, such as fruits, vegetables, prune juice, apple juice and high fiber cereal.  Take a laxative if you don t move your bowels at least every other day. Miralax , Milk of Magnesia, Colace , or Senna  can be used to keep you regular.      Remember that you can always come back to the Emergency Department if you are not able to see your regular doctor in the amount of time listed above, if you get any new symptoms, or if there is anything that worries you.

## 2017-01-26 LAB
BACTERIA SPEC CULT: NO GROWTH
Lab: NORMAL
MICRO REPORT STATUS: NORMAL
SPECIMEN SOURCE: NORMAL

## 2017-01-27 ENCOUNTER — OFFICE VISIT (OUTPATIENT)
Dept: SLEEP MEDICINE | Facility: CLINIC | Age: 57
End: 2017-01-27
Payer: MEDICARE

## 2017-01-27 ENCOUNTER — OFFICE VISIT (OUTPATIENT)
Dept: FAMILY MEDICINE | Facility: CLINIC | Age: 57
End: 2017-01-27
Payer: MEDICARE

## 2017-01-27 VITALS
HEART RATE: 85 BPM | OXYGEN SATURATION: 96 % | HEIGHT: 60 IN | BODY MASS INDEX: 43.98 KG/M2 | TEMPERATURE: 98.8 F | WEIGHT: 224 LBS | DIASTOLIC BLOOD PRESSURE: 91 MMHG | SYSTOLIC BLOOD PRESSURE: 165 MMHG | RESPIRATION RATE: 16 BRPM

## 2017-01-27 VITALS
OXYGEN SATURATION: 96 % | WEIGHT: 210 LBS | BODY MASS INDEX: 41.23 KG/M2 | RESPIRATION RATE: 16 BRPM | DIASTOLIC BLOOD PRESSURE: 89 MMHG | SYSTOLIC BLOOD PRESSURE: 170 MMHG | HEIGHT: 60 IN | HEART RATE: 84 BPM

## 2017-01-27 DIAGNOSIS — E11.3599 TYPE 2 DIABETES MELLITUS WITH PROLIFERATIVE RETINOPATHY, WITH LONG-TERM CURRENT USE OF INSULIN, UNSPECIFIED LATERALITY, UNSPECIFIED PROLIFERATIVE RETINOPATHY TYPE: ICD-10-CM

## 2017-01-27 DIAGNOSIS — Z79.4 TYPE 2 DIABETES MELLITUS WITH PROLIFERATIVE RETINOPATHY, WITH LONG-TERM CURRENT USE OF INSULIN, UNSPECIFIED LATERALITY, UNSPECIFIED PROLIFERATIVE RETINOPATHY TYPE: ICD-10-CM

## 2017-01-27 DIAGNOSIS — Z79.4 TYPE 2 DIABETES MELLITUS TREATED WITH INSULIN (H): ICD-10-CM

## 2017-01-27 DIAGNOSIS — R42 DIZZINESS: Primary | ICD-10-CM

## 2017-01-27 DIAGNOSIS — I95.9 HYPOTENSIVE EPISODE: ICD-10-CM

## 2017-01-27 DIAGNOSIS — E11.9 TYPE 2 DIABETES MELLITUS TREATED WITH INSULIN (H): ICD-10-CM

## 2017-01-27 DIAGNOSIS — F51.04 PSYCHOPHYSIOLOGICAL INSOMNIA: ICD-10-CM

## 2017-01-27 DIAGNOSIS — Z12.11 SCREEN FOR COLON CANCER: ICD-10-CM

## 2017-01-27 DIAGNOSIS — G47.33 OSA (OBSTRUCTIVE SLEEP APNEA): Primary | ICD-10-CM

## 2017-01-27 DIAGNOSIS — Z78.0 ASYMPTOMATIC POSTMENOPAUSAL STATUS: ICD-10-CM

## 2017-01-27 LAB — HBA1C MFR BLD: 7.1 % (ref 4.3–6)

## 2017-01-27 PROCEDURE — 36415 COLL VENOUS BLD VENIPUNCTURE: CPT | Performed by: FAMILY MEDICINE

## 2017-01-27 PROCEDURE — 99214 OFFICE O/P EST MOD 30 MIN: CPT | Performed by: FAMILY MEDICINE

## 2017-01-27 PROCEDURE — 83036 HEMOGLOBIN GLYCOSYLATED A1C: CPT | Performed by: FAMILY MEDICINE

## 2017-01-27 NOTE — NURSING NOTE
Chief Complaint   Patient presents with     RECHECK     f/u psg       Initial /89 mmHg  Pulse 84  Resp 16  Ht 1.524 m (5')  Wt 95.255 kg (210 lb)  BMI 41.01 kg/m2  SpO2 96%  LMP 01/06/2015 Estimated body mass index is 41.01 kg/(m^2) as calculated from the following:    Height as of this encounter: 1.524 m (5').    Weight as of this encounter: 95.255 kg (210 lb).  BP completed using cuff size: regular    Moraima Aguilar CNA, Clinical coordinator

## 2017-01-27 NOTE — NURSING NOTE
Chief Complaint   Patient presents with     Hospital F/U       Initial /91 mmHg  Pulse 85  Temp(Src) 98.8  F (37.1  C) (Oral)  Resp 16  Ht 5' (1.524 m)  Wt 224 lb (101.606 kg)  BMI 43.75 kg/m2  SpO2 96%  LMP 01/06/2015 Estimated body mass index is 43.75 kg/(m^2) as calculated from the following:    Height as of this encounter: 5' (1.524 m).    Weight as of this encounter: 224 lb (101.606 kg).  BP completed using cuff size: large(lt)    Haley Shelby MA

## 2017-01-27 NOTE — PATIENT INSTRUCTIONS

## 2017-01-27 NOTE — MR AVS SNAPSHOT
After Visit Summary   1/27/2017    Nena Tang    MRN: [id number]           Patient Information     Date Of Birth          1960        Visit Information        Provider Department      1/27/2017 11:00 AM Moraima Da Silva MD [facility]        Today's Diagnoses     Dizziness    -  1     Type 2 diabetes mellitus treated with insulin (H)         Screen for colon cancer         Asymptomatic postmenopausal status         Hypotensive episode            Follow-ups after your visit        Your next 10 appointments already scheduled     Jan 30, 2017  1:00 PM   Treatment with ADULT MH DAY 4C   [facility] ([facility])    [address] [place] [address]   [phone]            Jan 31, 2017  1:00 PM   Treatment with ADULT MH DAY 4C   [facility] ([facility])    [address] [place] [address]   [phone]            Feb 02, 2017  1:00 PM   Treatment with ADULT MH DAY 4C   [facility] ([facility])    [address] [place] [address]   [phone]            Feb 03, 2017 10:30 AM   Return Visit with ART Mims CNP   [facility] ([facility])    [address]   [address] [place] [address]   [phone]            Feb 03, 2017 10:30 AM   Return Visit with BIN Mondragon   [facility] ([facility])    [address]  [address]  [facility] [address]   [phone]            Feb 03, 2017 11:30 AM   SHORT with Kenzie Sheehan   [facility] MTM ([facility])    [address] [place] [address] [place]  [address] [place] [address]  [address]   [phone]            Feb 06, 2017  1:00 PM   Treatment with ADULT MH DAY 4C   [facility] ([facility])    [address] [address]   [phone]            Feb 07, 2017  1:00 PM   Treatment with ADULT MH DAY 4C   [facility] ([facility])    [address] [address]   [phone]            Feb 09, 2017  1:00 PM   Treatment with ADULT MH DAY 4C   [facility] ([facility])    [facility] [address]   [phone]            Feb 13, 2017  1:00 PM   Treatment with ADULT MH DAY 4C   [facility] ([facility])    [facility] [address]   [phone]              Future tests that were ordered for you today     Open Future Orders        Priority Expected Expires Ordered    DEXA HIP/PELVIS/SPINE - Future Routine  1/27/2018 1/27/2017    Cardiac Event Monitor - Peds/Adult Routine  3/13/2017 1/27/2017            Who to contact     If you have questions or need follow up information about today's clinic visit or your schedule please contact [facility] directly at [phone].  Normal or non-critical lab and imaging results will be communicated to you by MyChart, letter or phone within 4 business days after the clinic has received the results. If you do not hear from us within 7 days, please contact the clinic through MyChart or phone. If you have a critical or abnormal lab result, we will notify you by phone as soon as possible.  Submit refill requests through [employer] or call your pharmacy and they will forward the refill request to us. Please allow 3 business days for your refill to be completed.          Additional Information About  "Your Visit        [employer] Information     [employer] lets you send messages to your doctor, view your test results, renew your prescriptions, schedule appointments and more. To sign up, go to [email or web address] . Click on \"Log in\" on the left side of the screen, which will take you to the Welcome page. Then click on \"Sign up Now\" on the right side of the page.     You will be asked to enter the access code listed below, as well as some personal information. Please follow the directions to create your username and password.     Your access code is: [email or web address]  Expires: 2017  3:41 PM     Your access code will  in 90 days. If you need help or a new code, please call your [place] clinic or [phone].        Care EveryWhere ID     This is your Care EveryWhere ID. This could be used by other organizations to access your [place] medical records  [id number]        Your Vitals Were     Pulse Temperature Respirations Height BMI (Body Mass Index) Pulse Oximetry    85 98.8  F (37.1  C) (Oral) 16 1.524 m (5') 43.75 kg/m2 96%    Last Period                   2015            Blood Pressure from Last 3 Encounters:   17 165/91   17 170/89   17 137/81    Weight from Last 3 Encounters:   17 101.606 kg (224 lb)   17 95.255 kg (210 lb)   17 95.255 kg (210 lb)              We Performed the Following     HEMOGLOBIN A1C          Today's Medication Changes          These changes are accurate as of: 17 11:39 AM.  If you have any questions, ask your nurse or doctor.               These medicines have changed or have updated prescriptions.        Dose/Directions    gabapentin 300 MG capsule   Commonly known as:  NEURONTIN   This may have changed:  when to take this   Used for:  Type 2 diabetes mellitus with diabetic neuropathy, with long-term current use of insulin (H)        Dose:  600 mg   Take 2 capsules (600 mg) by mouth 3 times daily   Quantity:  180 capsule   Refills:  " 2                Primary Care Provider Office Phone # Fax #    Jud King PA-C [phone]        [facility] [address] [place] [address]        Goals        Patient-Stated    Medical     Goal Comments - Note created  7/7/2016  9:15 AM by Chelsea Pulido RN    Get blood sugars under control    Improve medication compliance    As of today's date 7/7/2016 goal is met at 0 - 25%.   Goal Status:  Active          Thank you!     Thank you for choosing [facility]  for your care. Our goal is always to provide you with excellent care. Hearing back from our patients is one way we can continue to improve our services. Please take a few minutes to complete the written survey that you may receive in the mail after your visit with us. Thank you!             Your Updated Medication List - Protect others around you: Learn how to safely use, store and throw away your medicines at www.disposemymeds.org.          This list is accurate as of: 1/27/17 11:39 AM.  Always use your most recent med list.                   Brand Name Dispense Instructions for use    acetaminophen 500 MG tablet    TYLENOL    100 tablet    Take 2 tablets (1,000 mg) by mouth every 6 hours as needed for pain       aspirin 81 MG EC tablet    ASPIRIN LOW DOSE    100 tablet    Take 1 tablet (81 mg) by mouth daily       atorvastatin 80 MG tablet    LIPITOR    30 tablet    Take 1 tablet (80 mg) by mouth daily       benztropine 1 MG tablet    COGENTIN    60 tablet    Take 1 tablet (1 mg) by mouth 2 times daily       blood glucose monitoring lancets     1 Box    Use to test blood sugar 2 times daily or as directed.  Ok to substitute alternative if insurance prefers.       blood glucose monitoring test strip    ONE TOUCH ULTRA    1 Box    Check blood sugar 2 times a day as directed, One Touch Ultra Blue Test Strips Please match with Pt's Insurance and meter.       cefUROXime 500 MG tablet     CEFTIN    20 tablet    Take 1 tablet (500 mg) by mouth 2 times daily       cholecalciferol 32169 UNITS capsule    VITAMIN D3    7 capsule    Take 1 capsule (50,000 Units) by mouth once a week FOR LOW VITAMIN D       citalopram 20 MG tablet    celeXA    30 tablet    Take 1 tablet (20 mg) by mouth daily       dulaglutide 0.75 MG/0.5ML pen    TRULICITY    2 mL    Inject 0.75 mg Subcutaneous every 7 days       gabapentin 300 MG capsule    NEURONTIN    180 capsule    Take 2 capsules (600 mg) by mouth 3 times daily       glucose 4 G Chew chewable tablet     20 tablet    Take 2 every 15 minutes for blood sugar <70mg/dL. Recheck blood sugar every 15 minutes until above 70mg/dL, then eat a substantial meal.       haloperidol 5 MG tablet    HALDOL    30 tablet    Take 1 tablet (5 mg) by mouth At Bedtime       ibuprofen 600 MG tablet    ADVIL/MOTRIN    60 tablet    Take 1 tablet (600 mg) by mouth every 4 hours as needed for moderate pain       insulin aspart 100 UNIT/ML injection    NovoLOG PEN    1 Month    Inject 20 Units Subcutaneous 3 times daily (with meals) Inject an extra 7 units once daily for blood sugars over 500mg/dL. Call the clinic if recheck is over 500mg/dL.       insulin glargine 100 UNIT/ML injection    LANTUS    12 mL    Inject 45 Units Subcutaneous At Bedtime       insulin pen needle 31G X 6 MM     100 each    Use 4 daily or as directed.       losartan 100 MG tablet    COZAAR    30 tablet    Take 1 tablet (100 mg) by mouth daily       melatonin 5 MG Caps     90 capsule    Take 1 capsule by mouth daily At dinnertime       metoprolol 100 MG 24 hr tablet    TOPROL-XL    30 tablet    Take 1 tablet (100 mg) by mouth daily       omeprazole 40 MG capsule    priLOSEC    30 capsule    Take 1 capsule (40 mg) by mouth daily       * order for DME     1 Device    Equipment being ordered: Rolling walker       * order for DME     1 each    Hold Novolog if meals are refused.       senna-docusate 8.6-50 MG per tablet     SENOKOT-S;PERICOLACE    100 tablet    Take 1 tablet by mouth 2 times daily as needed for constipation       * Notice:  This list has 2 medication(s) that are the same as other medications prescribed for you. Read the directions carefully, and ask your doctor or other care provider to review them with you.

## 2017-01-27 NOTE — MR AVS SNAPSHOT
After Visit Summary   1/27/2017    Nena Tang    MRN: [id number]           Patient Information     Date Of Birth          1960        Visit Information        Provider Department      1/27/2017 9:30 AM William Eng MD [facility]        Today's Diagnoses     CINDY (obstructive sleep apnea)    -  1       Care Instructions      Your BMI is Body mass index is 41.01 kg/(m^2).  Weight management is a personal decision.  If you are interested in exploring weight loss strategies, the following discussion covers the approaches that may be successful. Body mass index (BMI) is one way to tell whether you are at a healthy weight, overweight, or obese. It measures your weight in relation to your height.  A BMI of 18.5 to 24.9 is in the healthy range. A person with a BMI of 25 to 29.9 is considered overweight, and someone with a BMI of 30 or greater is considered obese. More than two-thirds of American adults are considered overweight or obese.  Being overweight or obese increases the risk for further weight gain. Excess weight may lead to heart disease and diabetes.  Creating and following plans for healthy eating and physical activity may help you improve your health.  Weight control is part of healthy lifestyle and includes exercise, emotional health, and healthy eating habits. Careful eating habits lifelong are the mainstay of weight control. Though there are significant health benefits from weight loss, long-term weight loss with diet alone may be very difficult to achieve- studies show long-term success with dietary management in less than 10% of people. Attaining a healthy weight may be especially difficult to achieve in those with severe obesity. In some cases, medications, devices and surgical management might be considered.  What can you do?  If you are overweight or obese and are interested in methods for weight loss, you should discuss this with your provider.      Consider reducing daily calorie intake by 500 calories.     Keep a food journal.     Avoiding skipping meals, consider cutting portions instead.    Diet combined with exercise helps maintain muscle while optimizing fat loss. Strength training is particularly important for building and maintaining muscle mass. Exercise helps reduce stress, increase energy, and improves fitness. Increasing exercise without diet control, however, may not burn enough calories to loose weight.       Start walking three days a week 10-20 minutes at a time    Work towards walking thirty minutes five days a week     Eventually, increase the speed of your walking for 1-2 minutes at time    In addition, we recommend that you review healthy lifestyles and methods for weight loss available through the National Institutes of Health patient information sites:  http://win.niddk.nih.gov/publications/index.htm    And look into health and wellness programs that may be available through your health insurance provider, employer, local community center, or roxane club.    Weight management plan: Patient was referred to their PCP to discuss a diet and exercise plan.  Please, get new CPAP and return for a follow up visit within 2 months.    Thank you!    William Eng MD, MPH        Follow-ups after your visit        Your next 10 appointments already scheduled     Jan 27, 2017 11:00 AM   Return Visit with Moraima Da Silva MD   [facility] ([facility])    [address] [place] [address]  [address]  [facility] [address]   [phone]            Jan 30, 2017  1:00 PM   Treatment with ADULT  DAY 4C   [facility] ([facility])    [address] [address]   [phone]            Jan 31, 2017  1:00 PM   Treatment with ADULT  DAY 4C   [facility] ([facility])    [facility] [address] [address]   [phone]            Feb 02, 2017  1:00 PM   Treatment with ADULT MH DAY 4C   [facility] ([facility])    [address] [place] [address]   [phone]            Feb 03, 2017 10:30 AM   Return Visit with ART Mims CNP   [facility] ([facility])    [address]  [address]  [facility] [address]   [phone]            Feb 03, 2017 10:30 AM   Return Visit with BIN Mondragon   [facility] ([facility])    [address]  [address]  [facility] [address]   [phone]            Feb 03, 2017 11:30 AM   SHORT with Kenzie Sheehan   [facility] ([facility])    [address] [place] [address] [place]  [address]  [facility] [address]   [phone]            Feb 06, 2017  1:00 PM   Treatment with ADULT MH DAY 4C   [facility] ([facility])    [address] [place] [address]   [phone]            Feb 07, 2017  1:00 PM   Treatment with ADULT MH DAY 4C   [facility] ([facility])    [address] [place] [address]   [phone]            Feb 09, 2017  1:00 PM   Treatment with ADULT MH DAY 4C   [facility] ([facility])    [address] [place] [address]   [phone]              Who to contact     If you have questions or need follow up information about today's clinic visit or your schedule please contact [facility] directly at [phone].  Normal or non-critical lab and imaging results will be  "communicated to you by [employer], letter or phone within 4 business days after the clinic has received the results. If you do not hear from us within 7 days, please contact the clinic through [employer] or phone. If you have a critical or abnormal lab result, we will notify you by phone as soon as possible.  Submit refill requests through [employer] or call your pharmacy and they will forward the refill request to us. Please allow 3 business days for your refill to be completed.          Additional Information About Your Visit        [employer] Information     [employer] lets you send messages to your doctor, view your test results, renew your prescriptions, schedule appointments and more. To sign up, go to [email or web address] . Click on \"Log in\" on the left side of the screen, which will take you to the Welcome page. Then click on \"Sign up Now\" on the right side of the page.     You will be asked to enter the access code listed below, as well as some personal information. Please follow the directions to create your username and password.     Your access code is: [email or web address]  Expires: 2017  3:41 PM     Your access code will  in 90 days. If you need help or a new code, please call your [place] clinic or [phone].        Care EveryWhere ID     This is your Care EveryWhere ID. This could be used by other organizations to access your [place] medical records  [id number]        Your Vitals Were     Pulse Respirations Height BMI (Body Mass Index) Pulse Oximetry Last Period    84 16 1.524 m (5') 41.01 kg/m2 96% 2015       Blood Pressure from Last 3 Encounters:   17 170/89   17 137/81   17 161/100    Weight from Last 3 Encounters:   17 95.255 kg (210 lb)   17 95.255 kg (210 lb)   17 99.338 kg (219 lb)              We Performed the Following     Comprehensive DME          Today's Medication Changes          These changes are accurate as of: 17 10:18 AM.  If you have any " questions, ask your nurse or doctor.               These medicines have changed or have updated prescriptions.        Dose/Directions    gabapentin 300 MG capsule   Commonly known as:  NEURONTIN   This may have changed:  when to take this   Used for:  Type 2 diabetes mellitus with diabetic neuropathy, with long-term current use of insulin (H)        Dose:  600 mg   Take 2 capsules (600 mg) by mouth 3 times daily   Quantity:  180 capsule   Refills:  2                Primary Care Provider Office Phone # Fax #    Jud King PA-C [phone]       [facility] [address] [place] [address]        Goals        Patient-Stated    Medical     Goal Comments - Note created  7/7/2016  9:15 AM by Chelsea Pulido RN    Get blood sugars under control    Improve medication compliance    As of today's date 7/7/2016 goal is met at 0 - 25%.   Goal Status:  Active          Thank you!     Thank you for choosing [facility]  for your care. Our goal is always to provide you with excellent care. Hearing back from our patients is one way we can continue to improve our services. Please take a few minutes to complete the written survey that you may receive in the mail after your visit with us. Thank you!             Your Updated Medication List - Protect others around you: Learn how to safely use, store and throw away your medicines at www.disposemymeds.org.          This list is accurate as of: 1/27/17 10:18 AM.  Always use your most recent med list.                   Brand Name Dispense Instructions for use    acetaminophen 500 MG tablet    TYLENOL    100 tablet    Take 2 tablets (1,000 mg) by mouth every 6 hours as needed for pain       aspirin 81 MG EC tablet    ASPIRIN LOW DOSE    100 tablet    Take 1 tablet (81 mg) by mouth daily       atorvastatin 80 MG tablet    LIPITOR    30 tablet    Take 1 tablet (80 mg) by mouth daily       benztropine 1 MG tablet     COGENTIN    60 tablet    Take 1 tablet (1 mg) by mouth 2 times daily       blood glucose monitoring lancets     1 Box    Use to test blood sugar 2 times daily or as directed.  Ok to substitute alternative if insurance prefers.       blood glucose monitoring test strip    ONE TOUCH ULTRA    1 Box    Check blood sugar 2 times a day as directed, One Touch Ultra Blue Test Strips Please match with Pt's Insurance and meter.       cefUROXime 500 MG tablet    CEFTIN    20 tablet    Take 1 tablet (500 mg) by mouth 2 times daily       cholecalciferol 42170 UNITS capsule    VITAMIN D3    7 capsule    Take 1 capsule (50,000 Units) by mouth once a week FOR LOW VITAMIN D       citalopram 20 MG tablet    celeXA    30 tablet    Take 1 tablet (20 mg) by mouth daily       dulaglutide 0.75 MG/0.5ML pen    TRULICITY    2 mL    Inject 0.75 mg Subcutaneous every 7 days       gabapentin 300 MG capsule    NEURONTIN    180 capsule    Take 2 capsules (600 mg) by mouth 3 times daily       glipiZIDE 5 MG 24 hr tablet    glipiZIDE XL    30 tablet    Take 1 tablet (5 mg) by mouth daily       glucose 4 G Chew chewable tablet     20 tablet    Take 2 every 15 minutes for blood sugar <70mg/dL. Recheck blood sugar every 15 minutes until above 70mg/dL, then eat a substantial meal.       haloperidol 5 MG tablet    HALDOL    30 tablet    Take 1 tablet (5 mg) by mouth At Bedtime       ibuprofen 600 MG tablet    ADVIL/MOTRIN    60 tablet    Take 1 tablet (600 mg) by mouth every 4 hours as needed for moderate pain       insulin aspart 100 UNIT/ML injection    NovoLOG PEN    1 Month    Inject 20 Units Subcutaneous 3 times daily (with meals) Inject an extra 7 units once daily for blood sugars over 500mg/dL. Call the clinic if recheck is over 500mg/dL.       insulin glargine 100 UNIT/ML injection    LANTUS    12 mL    Inject 45 Units Subcutaneous At Bedtime       insulin pen needle 31G X 6 MM     100 each    Use 4 daily or as directed.       losartan 100 MG  tablet    COZAAR    30 tablet    Take 1 tablet (100 mg) by mouth daily       melatonin 5 MG Caps     90 capsule    Take 1 capsule by mouth daily At dinnertime       metoprolol 100 MG 24 hr tablet    TOPROL-XL    30 tablet    Take 1 tablet (100 mg) by mouth daily       omeprazole 40 MG capsule    priLOSEC    30 capsule    Take 1 capsule (40 mg) by mouth daily       * order for DME     1 Device    Equipment being ordered: Rolling walker       * order for DME     1 each    Hold Novolog if meals are refused.       senna-docusate 8.6-50 MG per tablet    SENOKOT-S;PERICOLACE    100 tablet    Take 1 tablet by mouth 2 times daily as needed for constipation       * Notice:  This list has 2 medication(s) that are the same as other medications prescribed for you. Read the directions carefully, and ask your doctor or other care provider to review them with you.

## 2017-01-27 NOTE — PROCEDURES
SLEEP STUDY INTERPRETATION  SPLIT-NIGHT STUDY      Patient: Nena Tang  YOB: 1960  Study Date: 1/10/2017  MRN: [id number]  Referring Provider: Jeffrey King MD, Anne  Ordering Provider: MD Eng Demian    Indications for Polysomnography: The patient is a 56 y old Female who is 5' and weighs 216.0 lbs.  Her BMI is 42.4, Wolf sleepiness scale is 10.0 and neck size is 43.0.  Relevant medical history includes irritable bowel syndrome, diabetes mellitus type 2, dyslipidemia, major depressive disorder, vitamin D deficiency, history of Takotsubo cardiomyopathy, migraine headaches, coronary artery disease with stent placement, and hypertension.  A diagnostic polysomnogram was performed to evaluate for Sleep Apnea and/or Hypoventilation/hypoxemia.  After 182.5 minutes of sleep time the patient exhibited sufficient respiratory events qualifying her for a CPAP trial which was then initiated.      Polysomnogram Data:  A full night polysomnogram (PSG) recorded the standard physiologic parameters including EEG, EOG, EMG, ECG, nasal and oral airflow.  Respiratory parameters of chest and abdominal movements were recorded with respiratory inductance plethysmography.  Oxygen saturation was recorded by pulse oximetry.      Diagnostic PSG  Sleep Architecture: Sleep architecture was somewhat abnormal with significant sleep fragmentation, but preserved sleep efficiency. Limited REM sleep without REM supine was obtained.  The total recording time of the diagnostic portion of the study was 189.6 minutes.  The total sleep time was 182.5 minutes.  During the diagnostic portion of the study the sleep latency was decreased at 0.6 minutes with the use of a sleep aid (zolpidem 5 mg).  REM latency was 180.5 minutes. Arousal index was increased at 61.8 arousals per hour.  Sleep efficiency was normal at 96.3%.  Wake after sleep onset was 3.5 minutes. The patient spent 14.2% of total sleep time in Stage N1, 82.7% in  Stage N2, 0.0% in Stage N3 and 3.0% in REM.       Respiration: Severe Obstructive Sleep Apnea with sleep related hypoxemia was present. Intermittent, moderate to loud snoring observed.     Events - During the diagnostic portion of the study, the polysomnogram revealed a presence of 18 obstructive, 1 central, and 0 mixed apneas resulting in an apnea index of 6.2 events per hour.  There were 171 hypopneas resulting in a hypopnea index of 56.2 events per hour.  The combined apnea/hypopnea index was 62.5 events per hour.  The REM AHI was 43.6 events per hour.  The supine AHI was 90.9 events per hour.  The RERA index was - events per hour.  The RDI was 62.5 events per hour.     Snoring - was reported as moderate to loud and intermittent.    Respiratory rate and pattern - was notable for normal respiratory rate and pattern.    Sustained Sleep Associated Hypoventilation - Transcutaneous carbon dioxide monitoring was not used, however significant hypoventilation was not suggested by oximetry.    Sleep Associated Hypoxemia - (Greater than 5 minutes O2 sat below 89%) was not present. Baseline oxygen saturation was 90.7%. Lowest oxygen saturation was 70.3%. Time spent less than or equal to 88% was 51.4 minutes.  Time spent less than or equal to 89% was 65.7 minutes.    Treatment PSG  Sleep Architecture: Improved sleep architecture with also improved sleep fragmentation during the treatment portion. REM sleep, including REM supine was obtained during treatment (not during final pressure).   At 02:51:23 AM the patient was placed on CPAP treatment and was titrated at pressures ranging from 6 cmH2O up to 13 cmH2O. The total recording time of the treatment portion of the study was 285.5 minutes. The total sleep time was 277.0 minutes. During the treatment portion of the study the sleep latency was 0.0 minutes. REM latency was 68.5 minutes. Arousal index was improved, but still increased at 33.4 arousals per hour. Sleep efficiency  was normal at 97.0%.  Wake after sleep onset was 6.5 minutes. The patient spent 15.5% of total sleep time in Stage N1, 58.8% in Stage N2, 0.0% in Stage N3 and 25.6% in REM.       Respiration: Adequate CPAP titration at final pressure of 13 cmH2O was obtained. No REM supine obtained during the final pressure.  The optimal pressure was 13 cmH2O with an AHI of 7.1 events per hour. No REM supine obtained at final CPAP pressure. This titration was considered adequate.    Movement Activity: No abnormal periodic limb movements, REM EMG activity, nocturnal behaviors, or bruxism noted.    Periodic Limb Movements  o During the diagnostic portion of the study, there were 0 PLMs recorded. The PLM index was 0 movements per hour.  The PLM Arousal Index was 0 per hour.  o During the treatment portion of the study, there were 0 PLMs recorded. The PLM index was 0 movements per hour.  The PLM Arousal Index was 0 per hour.    REM EMG Activity - Excessive transient / sustained muscle activity was not present.    Nocturnal Behavior - Abnormal sleep related behaviors were not noted during / arising out of NREM / REM sleep.    Bruxism - None apparent.    Cardiac Summary: Normal sinus rhythm was observed with no arrhythmias.  During the diagnostic portion of the study, the average pulse rate was 70.3 bpm.  The minimum pulse rate was 63.4 bpm while the maximum pulse rate was 77.3 bpm.    During the treatment portion of the study, the average pulse rate was 70.6 bpm.  The minimum pulse rate was 65.9 bpm while the maximum pulse rate was 81.0 bpm.     Arrhythmias were not noted.    Assessment:     The diagnostic portion of the study demonstrated severe Obstructive Sleep Apnea with sleep related hypoxemia present. In addition, intermittent, moderate to loud snoring was observed. The limited REM sleep without REM supine could underestimate the true severity of the condition.    The treatment portion of the study showed adequate CPAP titration at  a final pressure of 13 cmH2O was obtained. No REM supine obtained during the final pressure.    During the diagnostic portion, the sleep architecture was somewhat abnormal with significant sleep fragmentation, but preserved sleep efficiency. Limited REM sleep without REM supine was obtained.    Improved sleep architecture with also improved sleep fragmentation during the treatment portion was noted. REM sleep, including REM supine was obtained during treatment (not during final pressure).    No abnormal periodic limb movements, REM EMG activity, nocturnal behaviors, or bruxism were noted during the PSG sleep study.    Finally, there was a normal sinus rhythm throughout the PSG sleep study.    Recommendations:    Treatment of CINDY with Auto-titrating PAP therapy with a range of 12 - 15 cmH2O.  Recommend clinical follow up with sleep management team, including review of compliance measures.    Patient may be a candidate for dental appliance through referral to Sleep Dentistry for the treatment of obstructive sleep apnea (only if CPAP treatment is not tolerated).    If devices are not acceptable or effective, patient may benefit from evaluation of possible surgical options for the treatment of obstructive sleep apnea and/or socially disruptive snoring.  If she is interested, would recommend referral to specialized ENT-Sleep provider.    Advise regarding the risks of drowsy driving.    Suggest optimizing sleep schedule and avoiding sleep deprivation.    Weight management (if BMI > 30).    Cardiac Comments: Normal Sinus Rhythm  Diagnostic Codes:    Obstructive Sleep Apnea G47.33    Repetitive Intrusions Into Sleep F51.8    Sleep Hypoxemia/Hypoventilation G47.36     Unspecified Sleep Disturbance G47.9       Split Night:  Sleep Study 1/10/2017 - (216.0 lbs) AHI 62.5, RDI 62.5, Supine AHI 90.9, REM AHI -, Low O2% -, Time Spent ?88% 51.4, Time Spent ?89% 65.7, CPAP was titrated to a pressure of 13 with an AHI 7.1. Time spent  in REM supine at this pressure was - minutes.          _____________________________________   William Eng MD, MPH 01/27/2017             Table of Oximetry Distribution    Range(%) Time in range (min) Time in range (%) Time in or below range (min) Time in or below range (%)   0.0 - 88.0 51.4 10.8% 51.4 10.8%   0.0 - 89.0 65.7 13.8% 65.7 13.8%

## 2017-01-27 NOTE — PROGRESS NOTES
SUBJECTIVE:                                                    Nena Tang is a 56 year old female who presents to clinic today for the following health issues:    ED/UC Followup:    Facility:  [facility] ED  Date of visit: 1/24/2017  Reason for visit: dizziness (HTN)  Current Status: hypotensive episodes, as below.  At this time in the office, Blood Pressure is Elevated. Pt states she is not currently feeling dizzy. Pt's caregiver states the pt's blood pressure fluctuates between high and low. Reports in the evenings her systolic blood pressure decreases to the 80's. States she started noticing this 6 weeks ago. Reports no blood pressure medication changes were made prior to these episodes.   Medications reviewed. Pt's caregiver states she checked her blood sugar levels when the pt was last feeling dizzy before going to the hospital, and her sugars were stable. She states the pt's pulse is usually around 60.   In ED pt dizziness corrected with fluids. Pt reports she is no longer drinking pop and has not replaced liquid intake with water or any other fluids during the day. Discussed possible methods formaking water taste better with pt.   Reports the past 2 weeks she has had incidences of low blood pressure 4 times (once the first week, 3 times this past week).      Hypotensive episodes    Duration: as above.  Most recently the past 2 weeks, but first episode since 6 weeks ago    Description (location/character/radiation):  states the pt has had 4 hypotensive episodes in the past 2 weeks (1 last week, 3 this week), but noticed the pt's first episode 6 weeks ago. States the pt's blood pressure has been fluctuating, and noticed the hypotensive episodes usually occur in the evenings.    Intensity:  moderate    Accompanying signs and symptoms: Denies low blood sugar or abnormal pulse readings during episodes    History (similar episodes/previous evaluation): Pt has hx of hypertension and  is on medication. Blood pressure readingson flowsheet show mostly elevated blood pressure readings and no low blood pressure readings    Therapies tried and outcome: Pt is on metoprolol    Problem list and histories reviewed & adjusted, as indicated.  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)     Illiterate     Irritable bowel syndrome     overweight - BMI >35     Takotsubo cardiomyopathy     CAD (coronary artery disease)     Restless legs syndrome (RLS)     CINDY (obstructive sleep apnea)- mild AHI 10.3     Verbal auditory hallucination     Chronic low back pain     Schizoaffective disorder, depressive type (H)     Migraine headache     HTN, goal below 140/90     Health Care Home     Lumbago     Cervicalgia     Cocaine abuse, episodic     Suicidal ideation     Esophageal reflux     Mild nonproliferative diabetic retinopathy (H)     Tardive dyskinesia     Alcohol use     Left cataract     Falls frequently     History of uterine cancer     Schizoaffective disorder (H)     Depression     Past Surgical History   Procedure Laterality Date     Hysterectomy  1983     uterine cancer yearly pap's per provider.     Coronary cta  5/21/2014     Release trigger finger  10/11/2012     Left thumb. Procedure: RELEASE TRIGGER FINGER;  LEFT THUMB TRIGGER RELEASE;  Surgeon: Tay Langley MD;  Location: [facility] oophorectormy for sho, w/bx  1983     UTERINE     Laparoscopic cholecystectomy  2008     Release trigger finger Right 12/26/2016     Procedure: RELEASE TRIGGER FINGER;  Surgeon: Albino Castañeda MD;  Location:  OR       Social History   Substance Use Topics     Smoking status: Never Smoker      Smokeless tobacco: Never Used     Alcohol Use: Yes      Comment: 2-3  times a week     Family History   Problem Relation Age of Onset     CANCER Mother      BLADDER     Respiratory Mother      COPD     GASTROINTESTINAL DISEASE Mother      CIRRHOSIS OF LI BOLIVAR     Alcohol/Drug Mother      DIABETES Mother      Hypertension Mother      Lipids Mother      C.A.D. Mother      Glaucoma Mother      Alcohol/Drug Sister      MENTAL ILLNESS Sister      Alcohol/Drug Sister      Psychotic Disorder Sister      CANCER Maternal Grandmother      UNKNOWN TYPE     CANCER Brother      COLON     Cancer - colorectal Brother      IN HIS LATE 30S     Alcohol/Drug Brother       OF HEROIN OVERDOSE AT AGE 22 YRS     Macular Degeneration No family hx of          Current Outpatient Prescriptions   Medication Sig Dispense Refill     cefUROXime (CEFTIN) 500 MG tablet Take 1 tablet (500 mg) by mouth 2 times daily 20 tablet 0     blood glucose monitoring (ONE TOUCH ULTRA) test strip Check blood sugar 2 times a day as directed, One Touch Ultra Blue Test Strips Please match with Pt's Insurance and meter. 1 Box 11     dulaglutide (TRULICITY) 0.75 MG/0.5ML pen Inject 0.75 mg Subcutaneous every 7 days 2 mL 0     metoprolol (TOPROL-XL) 100 MG 24 hr tablet Take 1 tablet (100 mg) by mouth daily 30 tablet 5     insulin aspart (NOVOLOG PEN) 100 UNIT/ML soln Inject 20 Units Subcutaneous 3 times daily (with meals) Inject an extra 7 units once daily for blood sugars over 500mg/dL. Call the clinic if recheck is over 500mg/dL. 1 Month 1     acetaminophen (TYLENOL) 500 MG tablet Take 2 tablets (1,000 mg) by mouth every 6 hours as needed for pain 100 tablet 0     ibuprofen (ADVIL,MOTRIN) 600 MG tablet Take 1 tablet (600 mg) by mouth every 4 hours as needed for moderate pain 60 tablet 0     melatonin 5 MG CAPS Take 1 capsule by mouth daily At dinnertime 90 capsule 1     glucose 4 G CHEW Take 2 every 15 minutes for blood sugar <70mg/dL. Recheck blood sugar every 15 minutes until above 70mg/dL, then eat a substantial meal. 20 tablet  1     order for DME Hold Novolog if meals are refused. 1 each 0     insulin glargine (LANTUS) 100 UNIT/ML PEN Inject 45 Units Subcutaneous At Bedtime 12 mL 1     insulin pen needle 31G X 6 MM Use 4 daily or as directed. 100 each prn     blood glucose monitoring (ONE TOUCH DELICA) lancets Use to test blood sugar 2 times daily or as directed.  Ok to substitute alternative if insurance prefers. 1 Box prn     citalopram (CELEXA) 20 MG tablet Take 1 tablet (20 mg) by mouth daily 30 tablet 2     cholecalciferol (VITAMIN D3) 94663 UNITS capsule Take 1 capsule (50,000 Units) by mouth once a week FOR LOW VITAMIN D 7 capsule 0     atorvastatin (LIPITOR) 80 MG tablet Take 1 tablet (80 mg) by mouth daily 30 tablet 2     gabapentin (NEURONTIN) 300 MG capsule Take 2 capsules (600 mg) by mouth 3 times daily (Patient taking differently: Take 600 mg by mouth 2 times daily ) 180 capsule 2     haloperidol (HALDOL) 5 MG tablet Take 1 tablet (5 mg) by mouth At Bedtime 30 tablet 2     omeprazole (PRILOSEC) 40 MG capsule Take 1 capsule (40 mg) by mouth daily 30 capsule 5     benztropine (COGENTIN) 1 MG tablet Take 1 tablet (1 mg) by mouth 2 times daily 60 tablet 3     order for DME Equipment being ordered: Rolling walker 1 Device 0     losartan (COZAAR) 100 MG tablet Take 1 tablet (100 mg) by mouth daily 30 tablet 2     senna-docusate (SENOKOT-S;PERICOLACE) 8.6-50 MG per tablet Take 1 tablet by mouth 2 times daily as needed for constipation 100 tablet 1     aspirin (ASPIRIN LOW DOSE) 81 MG EC tablet Take 1 tablet (81 mg) by mouth daily 100 tablet 4     Allergies   Allergen Reactions     Imidazole Antifungals Hives     Tolerates diflucan     Ketoprofen Itching     Pruritis to topical     Lisinopril Hives     Metformin Other (See Comments)     Patient hospitalized for lactic acidosis - admitting provider suspectd caused by metformin     Metronidazole Hives     BP Readings from Last 3 Encounters:   01/27/17 165/91   01/27/17 170/89    01/25/17 137/81    Wt Readings from Last 3 Encounters:   01/27/17 101.606 kg (224 lb)   01/27/17 95.255 kg (210 lb)   01/24/17 95.255 kg (210 lb)        Labs reviewed in EPIC  Problem list, Medication list, Allergies, and Medical/Social/Surgical histories reviewed in [facility] and updated as appropriate.    ROS:  Constitutional, HEENT, pulmonary, GI, , musculoskeletal, neuro, skin, endocrine and psych systems are negative, except as otherwise noted.  Cardiovascular: Positive for hypertension    This document serves as a record of the services and decisions personally performed and made by Moraima Da Silva MD. It was created on her behalf by Garrick Sanabria, a trained medical scribe. The creation of this document is based the provider's statements to the medical scribe.    Glenys Sanabria 11:25 AM, January 27, 2017    OBJECTIVE:                                                    /91 mmHg  Pulse 85  Temp(Src) 98.8  F (37.1  C) (Oral)  Resp 16  Ht 1.524 m (5')  Wt 101.606 kg (224 lb)  BMI 43.75 kg/m2  SpO2 96%  LMP 01/06/2015  Body mass index is 43.75 kg/(m^2).  GENERAL: Alert and no distress  PSYCH: MENTAL STATUS EXAM   Appearance: neat, well groomed, overweight Attitude: cooperative Behavior: sitting quietly in chair  Eye Contact: good  Speech: regular rate and volume  Orientation: to person, place, time  Mood: neutral  Affect: Mood congruent  Thought Process: logical, somewhat loose associations, appears pt is slow at comprehending what is being said to her  Insight: good  Judgement: good  Suicidal Ideation: none   Hallucination: None   Diagnostic Test Results:  none      ASSESSMENT/PLAN:                                                    Diabetes Type II, A1c Controlled, insulin dependent   Associated with the following complications    Renal Complications:  None    Ophthalmologic Complications: Mild nonproliferative retinopathy without macular edema    Neurologic Complications: None    Peripheral  Vascular Complications:  None    Other: None   Plan:  No changes in the patient's current treatment plan    Hypertension; uncontrolled    Associated with the following complications:    Diabetes   Plan:  No change in medications due to elevated readings here, but episodes of hypotension during the evenings. Discussed Holter monitor use and possible fluid intake methods with pt.       (R42) Dizziness  (primary encounter diagnosis)  Comment: Pt went to the ED 1/24/2017 due to a dizziness episode.  The past 2 weeks the pt has had 4 hypotensive episodes, but states the pt's blood pressure fluctuates between high and low. Discussed Holter monitor use with pt and caregiver  Plan: Cardiac Event Monitor - Peds/Adult            (I95.9) Hypotensive episode  Comment: As above,  states pt has had 4 hypotensive episodes in the past 2 weeks  Plan: Holter monitor prescription written. Monitor and re-evaluate.     (E11.9,  Z79.4) Type 2 diabetes mellitus treated with insulin (H)  Comment:  states she checked the pt's blood sugar level during her most recent hypotensive episode and states her sugars were at normal levels  Plan: HEMOGLOBIN A1C            (Z78.0) Asymptomatic postmenopausal status  Comment:   Plan: DEXA HIP/PELVIS/SPINE - Future            (Z12.11) Screen for colon cancer  Comment: FIT cards given to pt in clinic today  Plan: Complete FIT card assessment.     The information in this document, created by the medical scribe for me, accurately reflects the services I personally performed and the decisions made by me. I have reviewed and approved this document for accuracy prior to leaving the patient care area.  11:41 AM, 01/27/2017    Moraima Da Silva MD, MD  [facility]

## 2017-01-27 NOTE — PROGRESS NOTES
"Sleep Center [facility]  Outpatient Sleep Medicine Follow Up Visit  January 27, 2017          Name: Nena Tang  MRN# [id number]    Age: 56 year old  YOB: 1960        Date of Consultation: January 27, 2017  Consultation is requested by: BRANDON Cooley [facility]  [address] [place] [address] [address] [place] [id number]  Primary care provider: Jud Real    Patient is accompanied by: Patient presents with Caregiver today         Reason for Sleep Consult:      Nena Tang is a 56 year old female patient that presents here for an initial evaluation of \"sleep apnea.\"           Assessment and Plan:      Summary Sleep Diagnoses:    (1) CINDY  (2) Insomnia    Summary Recommendations:    (1) CINDY: This patient has a previously diagnosed case of CINDY categorized as mild with an AHI of 10.3 events per hour. She was started several years ago on Auto-CPAP and this was effective. However, the patient stopped using it over 2 yrs ago. Pt underwent a PSG sleep study that demonstrated severe CINDY. CPAP titration was completed and this was adequate for the condition. The results of the study were discussed and explained. The nature and pathophysiology of CINDY was discussed once again. The different treatment options were reviewed and discussed. The patient will be placed on a Auto-titration CPAP at minimum pressure of 12 cmH2O and maximum pressure of 15 cmH2O. Pt will be placed in a narrow range auto-titration device due to history of CAD and cardiomyopathy. She needs to be in an auto-titration device because no REM supine was obtained during final CPAP pressures and, although there was 75% resolution of the events at this pressure, this was higher than ideal. CPAP compliance was discussed and explained with the patient and caregiver. Pt will return for a follow up visit within 6 weeks.    (2) Insomnia: This patient has a poor hygiene with also significant psychiatric co-morbidities. Sleep " hygiene and relaxation techniques were discussed today again. During the following meeting, some more concepts of CBTI will be discussed. Pt is currently using melatonin as a sleeping aid and this will be continued for now.    Coding:  (G47.33) CINDY (obstructive sleep apnea)- mild AHI 10.3  (primary encounter diagnosis)  (G47.9) Sleeping difficulties  (G47.09) Other insomnia    Counseling included a comprehensive review of diagnostic and therapeutic strategies as well as risks of inadequate therapy.    Educational materials provided in instructions.    All questions and concerns were addressed today. Pt agrees and understands the assessment and plan.           History of Present Illness:      Nena Tang is a 56 year old Black  LHD female pt with history of DM type 2, irritable bowel syndrome, cardiomyopathy, CAD with stent placement, and HTN presents for a follow up visit today after completing a PSG sleep study due to difficulty sleeping and also CINDY follow up. During the initial encounter, the patient explained that she was diagnosed with CINDY several years ago and she was supposed to be using a CPAP. However, she stopped using the CPAP after her  passed away (pt lost interest in using it). The patient endorsed snoring, gasping / choking for air, and she has witnessed apneas. She also reports unrestorative sleep. Pt sleeps that the experiences nocturnal dyspnea with frequent arousals due to gasping and chocking. Pt explains that she spent a night in the hospital recently and she used a CPAP in the hospital and she was able to sleep perfectly. The patient otherwise denies any morning cephalgia, GERD sxs, nocturia, morning myalgias, or any other sleep related sxs.    The patient was evaluated in 02/2012 by Dr Flowers due to EDS and snoring. Patient underwent a PSG sleep study on 03/01/2012 to evaluate for a SDB. During that study, the pt had a mild case of CINDY with an AHI of 10.3 events per  hour. The condition was more prominent during REM and supine sleep. Throughout the study, the pt slept for 396.5 minutes with a sleep latency of 10 minutes after using zolpidem and Percocet. Given that she was symptomatic, she was placed on an Auto-CPAP machine with minimum pressure of 5 cmH2O and maximum pressure of 15 cmH2O. During the last visit, the pt was being compliant with CPAP use and the CPAP pressures were adjusted to a minimum of 6 cmH2O and a maximum of 12 cmH2O. Pt explains that since the diagnosis she gained over 10 lbs.  Pt explains that when she was using the CPAP, she was able to sleep well. She explains that when using the CPAP no snoring, EDS, or witnessed apneas were noted.  This patient underwent an in-lab PSG sleep study that demonstrated severe CINDY with an AHI of 62.5 events per hour. She also had sleep related hypoxemia. The condition was adequately titrated with a CPAP at maximum pressure of 13 cmH2O (no REM supine during final pressure) with a residual AHI of 7.1 events per hour. Pt explains that after using the CPAP during the sleep study, she felt more refreshed and rested in the morning. She following day, she explains that she felt better.  PREVIOUS IN- LAB or HOME SLEEP STUDIES: [employer] [facility]              Date: Study done in 2012              TYPE: In-lab PSG study              AHI: 10.3 events per hour              Intervention: Auto-CPAP with minium pressure of 5 cmH2O and maximum pressure of 15 cmH2O    SLEEP-WAKE SCHEDULE:      Nena Tang                 -Describes herself as a night person; prefers to go to sleep at 11:00 PM and wakes up at 9:00 AM.                 -Naps 5-6 times per week for 120-180 minutes, feels unrefreshed after naps; takes some inadvertent naps.                 -ON WEEKDAYS, goes to sleep at 10:00 PM during the week; awakens  8:00 AM with an alarm; falls asleep in about 2 hours; has difficulty falling asleep. Pt uses melatonin for sleeping.                  -ON WEEKENDS, goes to sleep at 12:00 AM and wakes up at 11:00 AM with an alarm; falls asleep in over 2 hours.                   -Awakens 2-4 times a night for 60 minutes before falling back to sleep; awakens to uncertain reasons (maybe dreams).      BEDTIME ACTIVITIES AND SHIFT WORK:    Nena Tang                -Bedtime Activities and Other Sleeping Information: [facility] in a [facility]. Pt has her own bedroom. Pt watches TV in bed. Otherwise, no other activities reported in bed. However, the patient enjoys eating her mails most of the time at night time.                 -Occupation: Pt is not currently working.      SCALES              SLEEP APNEA: [employer]ang score: 7 / 8              SLEEPINESS: [place] sleepiness scale (ESS):  11 / 24 (initial score)                          Drowsy driving / near accidents: Pt does not drive    SLEEP COMPLAINTS:  Cardio-respiratory                -Snoring: Snores very loud                -Dyspnea: Pt admits having witnessed apneas              -Morning headaches or confusion: Denies any morning cephalgia              -Coexisting Lung disease: Denies diagnosed or known lung disease at this time                                   -Coexisting Heart disease: Pt was diagnosed recently with heart valvular disease. Pt also had a MI about 6 yrs ago.                                   -Does patient have a bed partner: Patient sleeps alone              -Has bed partner been sleeping separately because of snoring:  N/A            RLS Screen:                 -When you try to relax in the evening or sleep at night, do you ever have unpleasant, restless feelings in your legs that can be relieved by walking or movement? Yes. Pt explains that she moves her legs quite a bit during the day. She explains that, although she moves her legs during the day, this does not keep her up at night time.                -Periodic limb movement: None reported    Narcolepsy:          -  Denies sudden urges of sleep attacks        - Denies cataplexy        - Denies sleep paralysis          - Admits hallucinations. When going to sleep, pt reports some visual hallucinations (someone staring over her).     Sleep Behaviors:        - Denies leg symptoms/movements        - Denies motor restlessness        - Denies night terrors        - Denies bruxism        - Denies automatic behaviors    Other Subjective Complaints:        - Denies anxiety or rumination          - Denies pain and discomfort at  night        - Denies waking up with heart pounding or racing        - Denies GERD or aspiration                Parasomnia:               -NREM - Denies recurrent persistent confusional arousal, night eating, sleep walking or sleep terrors                              -REM  - Denies dream enactment; No injuries reported while sleeping.     -Driving Accident or Near Accidents: Pt does not drive           Medications:        Current Outpatient Prescriptions    Medication  Sig       insulin aspart (NOVOLOG PEN) 100 UNIT/ML soln  Inject 20 Units Subcutaneous 3 times daily (with meals) Inject an extra 7 units once daily for blood sugars over 500mg/dL. Call the clinic if recheck is over 500mg/dL.       acetaminophen (TYLENOL) 500 MG tablet  Take 2 tablets (1,000 mg) by mouth every 6 hours as needed for pain       ibuprofen (ADVIL,MOTRIN) 600 MG tablet  Take 1 tablet (600 mg) by mouth every 4 hours as needed for moderate pain       diphenhydrAMINE (BENADRYL) 25 MG tablet  Take 1 tablet (25 mg) by mouth every 6 hours as needed for itching or allergies       diphenhydrAMINE (BENADRYL) 25 MG tablet  Take 1 tablet (25 mg) by mouth every 6 hours as needed for itching or allergies       melatonin 5 MG CAPS  Take 1 capsule by mouth daily At dinnertime       glucose 4 G CHEW  Take 2 every 15 minutes for blood sugar <70mg/dL. Recheck blood sugar every 15 minutes until above 70mg/dL, then eat a substantial meal.       order for  DME  Hold Novolog if meals are refused.       insulin glargine (LANTUS) 100 UNIT/ML PEN  Inject 45 Units Subcutaneous At Bedtime       insulin pen needle 31G X 6 MM  Use 4 daily or as directed.       blood glucose monitoring (ONE TOUCH ULTRA) test strip  Check blood sugar 2 times a day as directed, One Touch Ultra Blue Test Strips Please match with Pt's Insurance and meter.       blood glucose monitoring (ONE TOUCH DELICA) lancets  Use to test blood sugar 2 times daily or as directed.  Ok to substitute alternative if insurance prefers.       citalopram (CELEXA) 20 MG tablet  Take 1 tablet (20 mg) by mouth daily       cholecalciferol (VITAMIN D3) 84393 UNITS capsule  Take 1 capsule (50,000 Units) by mouth once a week FOR LOW VITAMIN D       glipiZIDE (GLIPIZIDE XL) 5 MG 24 hr tablet  Take 1 tablet (5 mg) by mouth daily       isosorbide mononitrate (IMDUR) 60 MG 24 hr tablet  Take 1 tablet (60 mg) by mouth daily       metoprolol (TOPROL-XL) 100 MG 24 hr tablet  Take 1 tablet (100 mg) by mouth daily .  Take with the 25 mg tablet.  Total = 125 mg daily       metoprolol (TOPROL-XL) 25 MG 24 hr tablet  Take 1 tablet (25 mg) by mouth daily .  Take with the 100 mg tablet.  Total = 125 mg daily       atorvastatin (LIPITOR) 80 MG tablet  Take 1 tablet (80 mg) by mouth daily       gabapentin (NEURONTIN) 300 MG capsule  Take 2 capsules (600 mg) by mouth 3 times daily (Patient taking differently: Take 600 mg by mouth 2 times daily )       haloperidol (HALDOL) 5 MG tablet  Take 1 tablet (5 mg) by mouth At Bedtime       omeprazole (PRILOSEC) 40 MG capsule  Take 1 capsule (40 mg) by mouth daily       benztropine (COGENTIN) 1 MG tablet  Take 1 tablet (1 mg) by mouth 2 times daily       order for DME  Equipment being ordered: Rolling walker       losartan (COZAAR) 100 MG tablet  Take 1 tablet (100 mg) by mouth daily       senna-docusate (SENOKOT-S;PERICOLACE) 8.6-50 MG per tablet  Take 1 tablet by mouth 2 times daily as needed for  constipation       aspirin (ASPIRIN LOW DOSE) 81 MG EC tablet  Take 1 tablet (81 mg) by mouth daily        No current facility-administered medications for this visit.           Allergies    Allergen  Reactions       Imidazole Antifungals  Hives        Tolerates diflucan       Ketoprofen  Itching        Pruritis to topical       Lisinopril  Hives       Metronidazole  Hives               Past Medical History:      Denies needing any 02 supplement at night.       Past Medical History     Past Medical History    Diagnosis  Date       Irritable bowel syndrome         Obesity         Uterine cancer (H)  1983       Type 2 diabetes mellitus (H)  8/30/2011       Illiterate  8/30/2011       Hyperlipidemia LDL goal <100  10/31/2010       Moderate major depression (H)  6/8/2011       Noncompliance with medication regimen  6/8/2011       Osteopenia  10/7/2009       Vitamin B12 deficiency (non anaemic)  11/23/2009       Vitamin D deficiency  1/29/2009       Takotsubo cardiomyopathy         Schizoaffective disorder (H)  9/13/2011       Fecal urgency  3/8/2012       Migraine  4/19/2012       Verbal auditory hallucination  10/4/2012       CINDY (obstructive sleep apnea)  3/8/2012        uses CPAP       CAD (coronary artery disease)          5/2014 cath, nonbostructive stenosis to LAD, RCA.       Chronic low back pain  1/22/2013       Schizoaffective disorder, depressive type (H)  2/25/2013       Migraine headache  4/22/2013       Hypertension  7/29/2013       Suicidal intent  10/2/2013       Cocaine abuse, in remission         History of heroin abuse                 Past Surgical History:     Denies previous upper airway surgery.        Past Surgical History     Past Surgical History    Procedure  Laterality  Date       Laparoscopic cholecystectomy    2008       Hysterectomy    1983       C oophorectormy for malig, w/bx    1983        UTERINE       Release trigger finger    10/11/2012        Left thumb. Procedure: RELEASE TRIGGER  FINGER;  LEFT THUMB TRIGGER RELEASE;  Surgeon: Tay Langley MD;  Location: [facility]       Coronary cta    2014              Social History:        Social History    Substance Use Topics       Smoking status:  Never Smoker        Smokeless tobacco:  Never Used       Alcohol Use:  Yes          Comment: 2-3 times a week      Chemical History:              Tobacco: Never smoked              Uses 1 cups/day of coffee. Last caffeine intake is usually before only in the morning.              Supplements for wakefulness: Patient does not use any supplements to stay awake              EtOH: None Reported  Recreational Drugs: Patient denies using any recreational drugs      Psych Hx:    PHQ2: Positive  Current dangers to self or others: Denies any SI / HI, hallucinations, or delusions.           Family History:         Family History     Family History    Problem  Relation  Age of Onset       CANCER  Mother          BLADDER       Respiratory  Mother          COPD       GASTROINTESTINAL DISEASE  Mother          CIRRHOSIS OF LI BOLIVAR       Alcohol/Drug  Mother         DIABETES  Mother         Hypertension  Mother         Lipids  Mother         C.A.D.  Mother         Glaucoma  Mother         Alcohol/Drug  Sister         Schizophrenia  Sister         Alcohol/Drug  Sister         Psychotic Disorder  Sister         CANCER  Maternal Grandmother          UNKNOWN TYPE       CANCER  Brother          COLON       Cancer - colorectal  Brother          IN HIS LATE 30S       Alcohol/Drug  Brother           OF HEROIN OVERDOSE AT AGE 22 YRS       Macular Degeneration  No family hx of             Sleep Family Hx:       RLS- None reported         CINDY - Son  Insomnia - None reported  Parasomnia - None reported           Review of Systems:      A complete 10 point review of systems was negative other than HPI or as commented below:     CONSTITUTIONAL: NEGATIVE for fever, sweats or night sweats, drug allergies. Weight gained and chills  reported.  EYES: Diplopia and changes in vision reported.  ENT: NEGATIVE for ear pain, sore throat, sinus pain, post-nasal drip, runny nose, bloody nose  CARDIAC: NEGATIVE for chest pain or pressure. Pt reports rapid / irregular heart beats, breathlessness when lying flat, and swollen legs or swollen feet. Pt also reports elevated BP.  NEUROLOGIC: NEGATIVE weakness or numbness in the arms or legs. Occasional headaches during the day.  DERMATOLOGIC: NEGATIVE for rashes, new moles or change in mole(s)  PULMONARY: Pt endorses SOB at rest, SOB with activity, dry cough, productive cough, coughing up blood, wheezing or whistling when breathing.     GASTROINTESTINAL: NEGATIVE for loose or watery stools, fat or grease in stools, constipation, abdominal pain, bowel movements black in color or blood noted. Pt endorses some nausea and vomiting.  GENITOURINARY: NEGATIVE for pain during urination, blood in urine, urinating more frequently than usual, irregular menstrual periods.  MUSCULOSKELETAL: NEGATIVE for muscle pain. She admits having bone or joint pain as well as swollen joints.  ENDOCRINE: NEGATIVE for increased thirst or urination. Pt has DM and she is on insulin.  LYMPHATIC: NEGATIVE for swollen lymph nodes, lumps or bumps in the breasts or nipple discharge.           Physical Examination:    ./89 mmHg  Pulse 84  Resp 16  Ht 1.524 m (5')  Wt 95.255 kg (210 lb)  BMI 41.01 kg/m2  SpO2 96%  LMP 01/06/2015    Vitals: Elevated BP.  General: Alert, oriented, not in distress. Dressed casually; Good eye contact; Comfortably sitting in a chair; in no apparent distress  HEENT: Normocephalic and atraumatic; NL TM x 2; pupils are isocoric and equally responsive to the light. PERRLA. EOMI. Normal fundoscopic examination; Nasal turbinates are normal with a normal septal alignment;  Mallampati score: Grade IV; Tonsillar hypertrophy: 2  visible at pillars; Pharynx with no erythema or exudates. Small and crowded oropharynx  with a low-lying soft palate and macroglossia noted.  NECK: neck supple; symmetrical; no lymphadenopathy; no thyromegaly, bruit, JVD noted. Neck circumference of 18 inches (46 cm).  Lungs: both hemithoraces are symmetrical, normal to palpation, no dullness to percussion, auscultation of lungs revealed normal breath sounds with no expirium prolongation, wheezing, rhonci and crackles.  CVS: Normal S1 and S2 heart sounds with no extra heart sounds. No murmur, rubs, or clicks. Normal peripheral pulses throughout with no obvious peripheral edema.  Extremities/musculoskeletal: no peripheral edema, deformity, cyanosis, clubbing  Neurology: awake, alert, and oriented x 3. No obvious gross motor / sensorial deficits with normal strength in all extremities at 5/5 and normal sensation throughout. Cranial nerves are grossly intact with normal II to XII CN functions.  Psychiatry: Mood and affect are appropriate. Euthymic with affect congruent with full range and intensity. No SI/HI with adequate insight and judgement.          Data: All pertinent previous laboratory data reviewed      No results found for: PH, PHARTERIAL, PO2, [id number], SAT, PCO2, HCO3, BASEEXCESS, GILBERTO, BEB  Lab Results    Component  Value  Date      TSH  0.93  10/25/2016      TSH  1.11  02/15/2016      Lab Results    Component  Value  Date      GLC  356*  11/11/2016      GLC  179*  10/25/2016      Lab Results    Component  Value  Date      HGB  10.2*  11/11/2016      HGB  11.4*  10/25/2016      Lab Results    Component  Value  Date      BUN  21  11/11/2016      BUN  12  10/25/2016      CR  0.96  11/11/2016      CR  0.62  10/25/2016      Echocardiology:              -Recent [email or web address] cardiac test showed a left ventricular EF calculated at 56%. Normal myocardial perfusion was noted. Also, normal wall motion was observed.    Chest x-ray:              -Most recent chest x-ray films on 02/14/2016 demonstrated mildly enlarged heart with possible increased pulmonary  vasculature. Otherwise, no other abnormalities noted.    PFT: None Recent Available    Laboratory Studies:      Component  Value  Ref Range & Units  Status       WBC  6.3  4.0 - 11.0 10e9/L  Final       RBC Count  3.30 (L)  3.8 - 5.2 10e12/L  Final       Hemoglobin  10.2 (L)  11.7 - 15.7 g/dL  Final       Hematocrit  31.5 (L)  35.0 - 47.0 %  Final       MCV  96  78 - 100 fl  Final       MCH  30.9  26.5 - 33.0 pg  Final       MCHC  32.4  31.5 - 36.5 g/dL  Final       RDW  12.5  10.0 - 15.0 %  Final       Platelet Count  199  150 - 450 10e9/L  Final       Diff Method  Automated Method    Final       % Neutrophils  51.6  %  Final       % Lymphocytes  36.6  %  Final       % Monocytes  9.8  %  Final       % Eosinophils  1.1  %  Final       % Basophils  0.3  %  Final       % Immature Granulocytes  0.6  %  Final       Nucleated RBCs  0  0 /100  Final       Absolute Neutrophil  3.3  1.6 - 8.3 10e9/L  Final       Absolute Lymphocytes  2.3  0.8 - 5.3 10e9/L  Final       Absolute Monocytes  0.6  0.0 - 1.3 10e9/L  Final       Absolute Eosinophils  0.1  0.0 - 0.7 10e9/L  Final       Absolute Basophils  0.0  0.0 - 0.2 10e9/L  Final       Abs Immature Granulocytes  0.0  0 - 0.4 10e9/L  Final       Absolute Nucleated RBC  0.0    Final           Sodium  139  133 - 144 mmol/L  Final       Potassium  4.5  3.4 - 5.3 mmol/L  Final       Comment:        Specimen slightly hemolyzed, potassium may be falsely elevated       Chloride  106  94 - 109 mmol/L  Final       Carbon Dioxide  24  20 - 32 mmol/L  Final       Anion Gap  9  3 - 14 mmol/L  Final       Glucose  356 (H)  70 - 99 mg/dL  Final       Urea Nitrogen  21  7 - 30 mg/dL  Final       Creatinine  0.96  0.52 - 1.04 mg/dL  Final       GFR Estimate  60 (L)  >60 mL/min/1.7m2  Final       Comment:        Non  GFR Calc       GFR Estimate If Black  73  >60 mL/min/1.7m2  Final       Comment:         GFR Calc       Calcium  8.1 (L)  8.5 - 10.1 mg/dL  Final        Bilirubin Total  0.2  0.2 - 1.3 mg/dL  Final       Albumin  3.0 (L)  3.4 - 5.0 g/dL  Final       Protein Total  6.9  6.8 - 8.8 g/dL  Final       Alkaline Phosphatase  105  40 - 150 U/L  Final       ALT  18  0 - 50 U/L  Final       AST  19  0 - 45 U/L  Final           Component  Value  Ref Range & Units  Status       Hemoglobin A1C  9.7 (H)  4.3 - 6.0 %  Final           Component  Value  Ref Range & Units  Status       INR    0.86 - 1.14  Final        William Eng MD, MPH  Clinical Sleep Medicine    [facility] [address], [place] [address]    [phone] Clinic    Total time spent with patient: 30 min. Over >50% of the time was spent for face to face counseling, education, and evaluation.

## 2017-01-30 ENCOUNTER — HOSPITAL ENCOUNTER (OUTPATIENT)
Dept: BEHAVIORAL HEALTH | Facility: CLINIC | Age: 57
End: 2017-01-30
Attending: PSYCHOLOGIST
Payer: MEDICARE

## 2017-01-30 ENCOUNTER — ALLIED HEALTH/NURSE VISIT (OUTPATIENT)
Dept: CARDIOLOGY | Facility: CLINIC | Age: 57
End: 2017-01-30
Attending: FAMILY MEDICINE
Payer: MEDICARE

## 2017-01-30 DIAGNOSIS — R42 DIZZINESS: ICD-10-CM

## 2017-01-30 PROCEDURE — H2012 BEHAV HLTH DAY TREAT, PER HR: HCPCS

## 2017-01-30 PROCEDURE — 93270 REMOTE 30 DAY ECG REV/REPORT: CPT | Mod: ZF

## 2017-01-30 PROCEDURE — 93228 REMOTE 30 DAY ECG REV/REPORT: CPT | Performed by: INTERNAL MEDICINE

## 2017-01-30 NOTE — NURSING NOTE
Per Dr.SANDRA BALLARD, patient to have 30 DAY CARDIONET monitor placed.  Diagnosis: DIZZINESS  Monitor placed: Yes  Patient Instructed: Yes  Patient verbalized understanding: Yes      # [id number]  KIT# [id number]    PLACED BY- TRACEY Garibaywith Eugenia Harvey CMA

## 2017-01-30 NOTE — PROGRESS NOTES
Group Therapy Progress Notes     Area of Treatment Focus:  Symptom Management, Personal Safety, Community Resources/Discharge Planning, Abstinence/Relapse Prevention, Develop/Improve Independent Living/Socialization Skills/Interpersonal Relationships and Cultural/Racial/Health and Spiritual    Therapeutic Interventions/Treatment Strategies:  Support, Redirection, Feedback, Limit/Boundaries, Safety Assessments, Structured Activity, Problem Solving, Clarification, Education, Motivational Enhancement Therapy, Cognitive Behavioral Therapy and Structured Activity    Response to Treatment Strategies:  Accepted Feedback, Gave Feedback, Listened, Focused on Goals, Attentive, Accepted Support, Alert, Demonstrates Behavior Change    Name of Group:  [facility] Group Therapy   Progress Note   The client , Nena reported being safe today.  The group discussed different areas of their lives, and how they are able to care for themselves, get out in the community, and their feelings for the past few days.     The following was reported:     Sleep:    ok  Medication:   Her blood pressure is being monitored, and she currently is wearing a heart monitor  Concentration:  ok  Appetite:   She noticed an increase in appetite  Interest level and activities: Resting and going to appointments  Social contacts:  Doctors, staff, and she called her kids  Feelings:    Anxious about her physical health  Psychosis:   Decreased, feeling ok  Skills used: Eating healthy foods, resting, keeping appointments        Is this a Weekly Review of the Progress on the Treatment Plan?  Yes.      Are Treatment Plan Goals being addressed?  Yes, continue treatment goals      Are Treatment Plan Strategies to Address Goals Effective?  Yes, continue treatment strategies      Are there any current contracts in place?  No

## 2017-01-31 ENCOUNTER — HOSPITAL ENCOUNTER (OUTPATIENT)
Dept: BEHAVIORAL HEALTH | Facility: CLINIC | Age: 57
End: 2017-01-31
Attending: PSYCHOLOGIST
Payer: MEDICARE

## 2017-01-31 PROCEDURE — H2012 BEHAV HLTH DAY TREAT, PER HR: HCPCS

## 2017-01-31 NOTE — PROGRESS NOTES
Group Therapy Progress Notes     Area of Treatment Focus:  Symptom Management, Personal Safety, Community Resources/Discharge Planning, Abstinence/Relapse Prevention, Develop/Improve Independent Living/Socialization Skills/Interpersonal Relationships and Cultural/Racial/Health and Spiritual    Therapeutic Interventions/Treatment Strategies:  Support, Redirection, Feedback, Limit/Boundaries, Safety Assessments, Structured Activity, Problem Solving, Clarification, Education, Motivational Enhancement Therapy, Cognitive Behavioral Therapy and Structured Activity    Response to Treatment Strategies:  Accepted Feedback, Gave Feedback, Listened, Focused on Goals, Attentive, Accepted Support, Alert, Demonstrates Behavior Change    Name of Group:  4[facility] Group Therapy and Mental Health Management     Progress Note   The client reported being safe today.  The group discussed different areas of their lives, and how they are able to care for themselves, get out in the community, and their feelings for the past few days.     The following was reported:  Sleep:   Agitated and needs a new C-pap machine  Medication:  As prescribed  Concentration: Little off  Appetite:  ok  Interest level and activities: Eating supper with other house-mates, going to dr appointments  Social contacts: Residents, staff, her kids  Feelings:   depression  Psychosis:  She reported that she still sees the shadow man in her room, and leaves the light on to help the image     Go away.    Skills used:  She has gone to  [facility] about her dizziness and heart problems and blood pressure, she continues     To wear her heart monitor, she is eating healthy and resting.     Mental Health Management   The group participated in a discussion about mindfulness   The group talked about different ways to learn to accept thoughts and images and feelings.  The group worked on a structured activity to learn skills about Acceptance and identifying what they experienced.  The  group discussion resulted in them learning a new skill to apply to their daily skills.        Is this a Weekly Review of the Progress on the Treatment Plan?  Yes.      Are Treatment Plan Goals being addressed?  Yes, continue treatment goals      Are Treatment Plan Strategies to Address Goals Effective?  Yes, continue treatment strategies      Are there any current contracts in place?  No

## 2017-01-31 NOTE — PROGRESS NOTES
Group Therapy Progress Notes     Area of Treatment Focus:  Symptom Management    Therapeutic Interventions/Treatment Strategies:  Support, Feedback, Safety Assessments, Clarification Structured Activity and Education    Response to Treatment Strategies:  Accepted Feedback, Listened, Attentive, Accepted Support and Alert    Name of Group:  Life Skills    Progress Note  Client presented with calm,even mood.Good focus and concentration during a discussion related to stress management with a focus on healthy lifestyle.Thought process clear,goal directed and reality based.Treatment goals not addressed.      Is this a Weekly Review of the Progress on the Treatment Plan?  Yes.      Are Treatment Plan Goals being addressed?  Yes, continue treatment goals      Are Treatment Plan Strategies to Address Goals Effective?  Yes, continue treatment strategies      Are there any current contracts in place?  No

## 2017-02-01 NOTE — PROGRESS NOTES
Group Therapy Progress Notes     Area of Treatment Focus:  Symptom Management    Therapeutic Interventions/Treatment Strategies:  Support, Feedback, Safety Assessments, Clarification Structured Activity and Education    Response to Treatment Strategies:  Accepted Feedback, Listened, Attentive, Accepted Support and Alert    Name of Group:  Life Skills    Progress Note  Client presented with calm,even mood.Good focus and concentration during a discussion related to communication skills with a focus on social risk taking.Thought process clear,goal directed and reality based.Treatment goals not addressed.      Is this a Weekly Review of the Progress on the Treatment Plan?  Yes.      Are Treatment Plan Goals being addressed?  Yes, continue treatment goals      Are Treatment Plan Strategies to Address Goals Effective?  Yes, continue treatment strategies      Are there any current contracts in place?  No

## 2017-02-02 DIAGNOSIS — G47.33 OSA (OBSTRUCTIVE SLEEP APNEA): Primary | ICD-10-CM

## 2017-02-03 ENCOUNTER — OFFICE VISIT (OUTPATIENT)
Dept: BEHAVIORAL HEALTH | Facility: CLINIC | Age: 57
End: 2017-02-03
Payer: MEDICARE

## 2017-02-03 ENCOUNTER — OFFICE VISIT (OUTPATIENT)
Dept: FAMILY MEDICINE | Facility: CLINIC | Age: 57
End: 2017-02-03
Payer: MEDICARE

## 2017-02-03 ENCOUNTER — OFFICE VISIT (OUTPATIENT)
Dept: PHARMACY | Facility: CLINIC | Age: 57
End: 2017-02-03

## 2017-02-03 VITALS
WEIGHT: 223 LBS | OXYGEN SATURATION: 96 % | TEMPERATURE: 98.4 F | HEIGHT: 60 IN | BODY MASS INDEX: 43.78 KG/M2 | DIASTOLIC BLOOD PRESSURE: 82 MMHG | HEART RATE: 80 BPM | SYSTOLIC BLOOD PRESSURE: 142 MMHG | RESPIRATION RATE: 16 BRPM

## 2017-02-03 DIAGNOSIS — F33.1 MAJOR DEPRESSIVE DISORDER, RECURRENT EPISODE, MODERATE (H): Primary | ICD-10-CM

## 2017-02-03 DIAGNOSIS — G47.00 INSOMNIA, UNSPECIFIED TYPE: ICD-10-CM

## 2017-02-03 DIAGNOSIS — I10 HTN, GOAL BELOW 140/90: ICD-10-CM

## 2017-02-03 DIAGNOSIS — Z12.11 SCREEN FOR COLON CANCER: ICD-10-CM

## 2017-02-03 DIAGNOSIS — E11.3299 TYPE 2 DIABETES MELLITUS WITH MILD NONPROLIFERATIVE RETINOPATHY WITHOUT MACULAR EDEMA, WITH LONG-TERM CURRENT USE OF INSULIN, UNSPECIFIED LATERALITY (H): Primary | ICD-10-CM

## 2017-02-03 DIAGNOSIS — E11.43 DIABETIC AUTONOMIC NEUROPATHY ASSOCIATED WITH TYPE 2 DIABETES MELLITUS (H): ICD-10-CM

## 2017-02-03 DIAGNOSIS — R52 PAIN: ICD-10-CM

## 2017-02-03 DIAGNOSIS — F14.10 COCAINE ABUSE, EPISODIC (H): ICD-10-CM

## 2017-02-03 DIAGNOSIS — E66.01 MORBID OBESITY DUE TO EXCESS CALORIES (H): ICD-10-CM

## 2017-02-03 DIAGNOSIS — E11.69 TYPE 2 DIABETES MELLITUS WITH OTHER SPECIFIED COMPLICATION (H): Primary | ICD-10-CM

## 2017-02-03 DIAGNOSIS — E66.9 OBESITY, UNSPECIFIED OBESITY SEVERITY, UNSPECIFIED OBESITY TYPE: ICD-10-CM

## 2017-02-03 DIAGNOSIS — Z79.4 TYPE 2 DIABETES MELLITUS WITH MILD NONPROLIFERATIVE RETINOPATHY WITHOUT MACULAR EDEMA, WITH LONG-TERM CURRENT USE OF INSULIN, UNSPECIFIED LATERALITY (H): Primary | ICD-10-CM

## 2017-02-03 DIAGNOSIS — F25.9 SCHIZOAFFECTIVE DISORDER (H): ICD-10-CM

## 2017-02-03 DIAGNOSIS — I25.119 CORONARY ARTERY DISEASE INVOLVING NATIVE HEART WITH ANGINA PECTORIS, UNSPECIFIED VESSEL OR LESION TYPE (H): ICD-10-CM

## 2017-02-03 PROCEDURE — 99607 MTMS BY PHARM ADDL 15 MIN: CPT | Performed by: PHARMACIST

## 2017-02-03 PROCEDURE — 90832 PSYTX W PT 30 MINUTES: CPT | Performed by: SOCIAL WORKER

## 2017-02-03 PROCEDURE — 99214 OFFICE O/P EST MOD 30 MIN: CPT | Performed by: PHYSICIAN ASSISTANT

## 2017-02-03 PROCEDURE — 99606 MTMS BY PHARM EST 15 MIN: CPT | Performed by: PHARMACIST

## 2017-02-03 RX ORDER — LOSARTAN POTASSIUM 100 MG/1
100 TABLET ORAL DAILY
Qty: 30 TABLET | Refills: 2 | Status: SHIPPED | OUTPATIENT
Start: 2017-02-03 | End: 2017-05-02

## 2017-02-03 ASSESSMENT — ANXIETY QUESTIONNAIRES
6. BECOMING EASILY ANNOYED OR IRRITABLE: NOT AT ALL
2. NOT BEING ABLE TO STOP OR CONTROL WORRYING: NOT AT ALL
IF YOU CHECKED OFF ANY PROBLEMS ON THIS QUESTIONNAIRE, HOW DIFFICULT HAVE THESE PROBLEMS MADE IT FOR YOU TO DO YOUR WORK, TAKE CARE OF THINGS AT HOME, OR GET ALONG WITH OTHER PEOPLE: NOT DIFFICULT AT ALL
5. BEING SO RESTLESS THAT IT IS HARD TO SIT STILL: NOT AT ALL
7. FEELING AFRAID AS IF SOMETHING AWFUL MIGHT HAPPEN: NOT AT ALL
3. WORRYING TOO MUCH ABOUT DIFFERENT THINGS: NOT AT ALL
1. FEELING NERVOUS, ANXIOUS, OR ON EDGE: NOT AT ALL
GAD7 TOTAL SCORE: 0

## 2017-02-03 ASSESSMENT — PATIENT HEALTH QUESTIONNAIRE - PHQ9: 5. POOR APPETITE OR OVEREATING: NOT AT ALL

## 2017-02-03 ASSESSMENT — PAIN SCALES - GENERAL: PAINLEVEL: SEVERE PAIN (7)

## 2017-02-03 NOTE — NURSING NOTE
Chief Complaint   Patient presents with     Psychiatric Problem     Medication Therapy Management       Initial /85 mmHg  Pulse 80  Temp(Src) 98.4  F (36.9  C) (Oral)  Resp 16  Ht 5' (1.524 m)  Wt 223 lb (101.152 kg)  BMI 43.55 kg/m2  SpO2 96%  LMP 01/06/2015 Estimated body mass index is 43.55 kg/(m^2) as calculated from the following:    Height as of this encounter: 5' (1.524 m).    Weight as of this encounter: 223 lb (101.152 kg).  BP completed using cuff size: large(lt)    Haley Shelby MA

## 2017-02-03 NOTE — PATIENT INSTRUCTIONS
Recommendations from today's MTM visit:                                                      1. Have a big glass of water in the morning to see if this helps with your stomach pains.     2. Increase Trulicity to 1.5mg weekly. If your stomach pain gets worse, give me a call!    Next MTM visit: 1 month    To schedule another MTM appointment, please call the clinic directly or you may call the MTM scheduling line at [phone] or toll-free at [phone].     My Clinical Pharmacist's contact information:                                                      It was a pleasure seeing you today!  Please feel free to contact me with any questions or concerns you have.      Kenzie Sheehan, PharmD  Medication Therapy Management Pharmacist  Pager: [phone]    You may receive a survey about the MT services you received.  I would appreciate your feedback to help me serve you better in the future. Please fill it out and return it when you can. Your comments will be anonymous.      My healthcare goals:                                                      Diabetes Goals:    Home Monitoring of Blood Sugars:Fasting  mg/dL and 2 hours after a meal less than 150 mg/dL.    Hemoglobin A1C: Less than 7%. Yours is A1C      7.1   1/27/2017.    Blood Pressure: Less than 140/90mmHg.    Cholesterol: You are taking atovastatin to help decrease the risk of heart disease.    Things you can do to help lower your blood sugars:    Diet: 3 meals and 1-2 snacks per day with 45-60 grams of carbohydrates per meal and 15-30 grams of carbohydrates per snack. Try to fill your plate at least half-full with vegetables, fill one-quarter full with lean meats or protein, and also make sure you get at least some carbohydrate with every meal.    Exercise: 30 minutes per day of anything that will increase your heart rate and make you break a sweat! Gardening, walking, cleaning the house, changing the oil in your car, etc. If you feel like 30  minutes per day is too much, start small. Even lifting canned foods or working your arms with a resistance band in front of the TV can help.

## 2017-02-03 NOTE — PROGRESS NOTES
SUBJECTIVE:                                                    Nena Tang is a 57 year old female who presents to clinic today for the following health issues:        Depression and Anxiety Follow-Up    Status since last visit: Improved depression and anxiety    Other associated symptoms:Patient stated she feels good    Complicating factors:     Significant life event: Yes-  Patient just celebrated her 57th birthday yesterday at [facility] and had a birthday cake also     Current substance abuse: None    PHQ-9 SCORE 12/20/2016 1/2/2017 2/3/2017   Total Score - - -   Total Score - - -   Total Score 7 0 0     TAMIA-7 SCORE 12/20/2016 1/2/2017 2/3/2017   Total Score - - -   Total Score 6 0 0   Total Score - - -        PHQ-9  English      PHQ-9   Any Language     GAD7         Amount of exercise or physical activity: None    Problems taking medications regularly: No    Medication side effects: none    Diet: low salt and diabetic    Problem list and histories reviewed & adjusted, as indicated.  Additional history: Patient has been feeling markedly better mentally and admits to better compliance with her medication management and medical appointments. Using her C-pap with good results.  Staying at [facility]. Presents with her Care Taker who relays that she has been having dips in her BP requiring ER visits.  SBP ~ 80 mmHg at one time they can recall.  Has also been presenting to Day treatment here at the .  Says that she has noticed that her vision is still cloudy and she would like Cataract surgery if her BGL is well enough controlled. With a Hgb A1c of 7.1% I believe this is adequate for this procedure.  They would like a podiatry referral for foot care and I am amenable to this as well.  She notes also that her abdominal discomfort persists and that this has become somewhat chronic.      BP Readings from Last 3 Encounters:   02/03/17 142/82   01/27/17 165/91   01/27/17 170/89    Wt Readings  from Last 3 Encounters:   02/03/17 223 lb (101.152 kg)   01/27/17 224 lb (101.606 kg)   01/27/17 210 lb (95.255 kg)                  Labs reviewed in EPIC  Problem list, Medication list, Allergies, and Medical/Social/Surgical histories reviewed in [facility] and updated as appropriate.    ROS:  Constitutional, HEENT, cardiovascular, pulmonary, gi and gu systems are negative, except as otherwise noted.    OBJECTIVE:                                                    /82 mmHg  Pulse 80  Temp(Src) 98.4  F (36.9  C) (Oral)  Resp 16  Ht 5' (1.524 m)  Wt 223 lb (101.152 kg)  BMI 43.55 kg/m2  SpO2 96%  LMP 01/06/2015  Body mass index is 43.55 kg/(m^2).  GENERAL: alert, no distress and obese  NECK: no adenopathy, no asymmetry, masses, or scars and thyroid normal to palpation  RESP: lungs clear to auscultation - no rales, rhonchi or wheezes  CV: regular rate and rhythm, normal S1 S2, no S3 or S4, no murmur, click or rub, no peripheral edema and peripheral pulses strong  ABDOMEN: Rotund. tenderness throughout with no palpable organomegaly or lipodystrophic change  MS: no gross musculoskeletal defects noted, no edema  Neuro: notable tardive dyskinesia with tongue lolling.   PSYCH: Psych: Alert and oriented times 3; coherent speech, normal   rate and volume, able to articulate logical thoughts, able   to abstract reason, no tangential thoughts, no hallucinations   or delusions  Her affect is appropriate.       ASSESSMENT/PLAN:                                                    1. Type 2 diabetes mellitus with other specified complication (H)  - BARIATRIC ADULT REFERRAL  - PODIATRY/FOOT & ANKLE SURGERY REFERRAL  -MTM consultation    2. Diabetic autonomic neuropathy associated with type 2 diabetes mellitus (H)    3. Screen for colon cancer  - GASTROENTEROLOGY ADULT REF PROCEDURE ONLY    4. Coronary artery disease involving native heart with angina pectoris, unspecified vessel or lesion type (H)  - losartan (COZAAR) 100 MG  tablet; Take 1 tablet (100 mg) by mouth daily  Dispense: 30 tablet; Refill: 2    5. Morbid obesity due to excess calories (H)  - BARIATRIC ADULT REFERRAL    6. HTN, goal below 140/90  - losartan (COZAAR) 100 MG tablet; Take 1 tablet (100 mg) by mouth daily  Dispense: 30 tablet; Refill: 2    Use medication as directed.  Follow up per Podiatry  Will consult with Ophthalmology regarding DM control  Follow up per MTM  Follow up in 3 months  Sooner if needed.  Patient amenable to this follow up plan.       Usman Hodgson PA-C  [facility] PRIMARY CARE

## 2017-02-03 NOTE — PROGRESS NOTES
"SUBJECTIVE/OBJECTIVE:                                                    Nena Tang is a 56 year old female coming in for a follow-up visit for Medication Therapy Management.  She was referred to me from Jud King. Pt accompanied by \"foster father\" Zheng - pt now in adult foster care, living with 4 other women. Pt also seeing Usman Hodgson today.    Chief Complaint: Follow up from our visit on 16.  DM f/up.     Medication Adherence: Much improved now in adult foster care - daily oversight of her medications and they seem very involved.    Diabetes:  Pt currently taking Lantus 45units daily at bedtime, Novolog 20units TID prior to meals +7 units daily for sugars >500 mg/dL, Trulicity 0.75mg weekly.  Pt is not experiencing side effects.   SMB-2 times daily - fasting and later in the day.   Ranges (based on BG log):  mg/dL fasting and  mg/dL post-prandial.   Symptoms of low blood sugar? none. Frequency of hypoglycemia? never.  Recent symptoms of high blood sugar? vision changes, polydipsia, fatigue, tingling and numbness  Microalbumin is < 30 mg/g. Pt is taking an ACEi/ARB.  Aspirin: Taking 81mg daily and denies side effects  Diet: Unchanged.    Weight loss: Pt reports that she wishes to lose weight. She reports that she has been drinking diet soda and her \"foster father\" reports they have been trying to make some kind of reward as a motivation to improve health. For example, going to the movies or eating at a restaurant. Pt reports that she has been dancing, which is good exercise for her.     Hypertension: Current medications include losartan 100 mg daily and metoprolol  mg daily. Patient does not self-monitor BP.  Patient reports no current medication side effects. Her BP today was repeated twice at 162/85 and 142/82 mmHg.     Pain: Patient reports that she has been experiencing some stomach pain in the morning. However, she reports her general pain to be 0 out of 10 today. " "    Insomnia: Current medications include: melatonin 5 mg daily. Pt reports no issues and she is  \"finally sleeping well\".    General Mood: Pt is very happy today and showed positivity. She was very motivated to continue to get better.     Current labs include:  BP Readings from Last 3 Encounters:   02/03/17 162/85   01/27/17 165/91   01/27/17 170/89     A1C      7.1   1/27/2017  A1C      9.7   11/11/2016  A1C      9.3   8/11/2016  A1C      9.8   5/23/2016  A1C      9.5   4/19/2016    Recent Labs   Lab Test  07/13/16   1350  07/14/15   1215   09/03/13   1156   CHOL   --   147   --   161   HDL   --   39*   --   37*   LDL  137*  78   < >  90   TRIG   --   151*   --   171*   CHOLHDLRATIO   --   3.8   --   4.4    < > = values in this interval not displayed.       ALT       23   5/3/2016    MICROL       23   7/13/2016  MICROALBUMIN    11.39   7/13/2016    Last Basic Metabolic Panel:  NA      134   6/15/2016   POTASSIUM      4.5   6/15/2016  CHLORIDE      104   6/15/2016  BUN       19   6/15/2016  BUN     13.5   5/19/2009  CR     0.70   6/15/2016    GFR ESTIMATE IF BLACK   Date Value Ref Range Status   01/24/2017 74 >60 mL/min/1.7m2 Final     Comment:      GFR Calc   01/22/2017 >90   GFR Calc   >60 mL/min/1.7m2 Final   12/29/2016 >90   GFR Calc   >60 mL/min/1.7m2 Final     TSH   Date Value Ref Range Status   12/29/2016 2.24 0.40 - 4.00 mU/L Final   ]  LMP 01/06/2015    Most Recent Immunizations   Administered Date(s) Administered     Influenza (IIV3) 09/24/2012     Influenza Vaccine IM 3yrs+ 4 Valent IIV4 09/16/2016     Mantoux 07/10/2014     Pneumococcal 23 valent 01/22/2009     TDAP (ADACEL AGES 11-64) 01/22/2009     ASSESSMENT:                                                    Current medications were reviewed with her today.     Medication Adherence:  Much improved, updated med list given to foster home staff.    Diabetes: Improved significantly. A1C is very close to " goal of <7% (A1C 7.1% on 01/27/2017). Her  mg/dL fasting and  mg/dL post-prandial are not within goal ranges of  mg/dL fasting, and <180 mg/dL post-prandial. She started on Trulicity 0.75 mg weekly during her last visit on 1/11/2017, which has still not brought her glucose levels to goal range. An increase in dose to 1.5mg could potentially help reduce the glucose levels and at the same time help with weight loss.     Weight loss: Needs Improvement. Discussed importance of change in diet, will also likely be helped by dose increase of Trulicity.     Hypertension: Needs improvement. Encouraged patient to reduce salt intake and to continue monitoring BP to achieve goal of <140/90 mmHg. Her second BP taken today during office visit was close to goal.       Pain: Improved. Drinking 12oz glass of water in the morning may help with the stomach pain in the morning.     Insomnia: Improved.     General Mood: Improved.     PLAN:                                                      1. Increase Trulicity dose to 1.5 mg.  2. Drink 12 oz glass water in the morning. Monitor stomach pain and call if gets worse.     I spent 30 minutes with this patient today.  All changes were made via verbal agreement with Usman Hodgson. A copy of the visit note was provided to the patient's primary care provider.     Will follow up in one month, appointment scheduled..    The patient was given a summary of these recommendations as an after visit summary.    Kenzie Sheehan, PharmD  Medication Therapy Management Pharmacist  Pager: [phone]

## 2017-02-03 NOTE — PROGRESS NOTES
[facility] - Integrated Primary Care   February 3, 2017      Behavioral Health Clinician Progress Note    Patient Name: Nena Tang           Service Type: Individual      Service Location:   Face to Face in Clinic     Session Start Time: 10:55 am  Session End Time: 11:10 am      Session Length: 16 - 37      Attendees: Client and caregiver (Zheng)    Visit Activities (Refresh list every visit): [facility] [employer]    Diagnostic Assessment Date: Not completed  Treatment Plan Review Date: Not completed  See Flowsheets for today's PHQ-9 and TAMIA-7 results  Previous PHQ-9:   PHQ-9 SCORE 12/20/2016 1/2/2017 2/3/2017   Total Score - - -   Total Score - - -   Total Score 7 0 0     Previous TAMIA-7:   TAMIA-7 SCORE 12/20/2016 1/2/2017 2/3/2017   Total Score - - -   Total Score 6 0 0   Total Score - - -       ALEXANDRA LEVEL:  ALEXANDRA Score (Last Two) 8/30/2011 6/9/2014   ALEXANDRA Raw Score 42 37   Activation Score 66 49.9   ALEXANDRA Level 3 2       DATA  Extended Session (60+ minutes): No  Interactive Complexity: No  Crisis: No    Treatment Objective(s) Addressed in This Session:  Target Behavior(s): disease management/lifestyle changes Mental Health    Depressed Mood: Increase interest, engagement, and pleasure in doing things  Decrease frequency and intensity of feeling down, depressed, hopeless  Improve quantity and quality of night time sleep / decrease daytime naps  Feel less tired and more energy during the day   Improve diet, appetite, mindful eating, and / or meal planning  Anxiety: will experience a reduction in anxiety, will develop more effective coping skills to manage anxiety symptoms, will develop healthy cognitive patterns and beliefs and will increase ability to function adaptively  Functional Impairment: will effectively address problems that interfere with adaptive functioning    Current Stressors / Issues:  Pt present during co-visit with caregiver (Zheng). Pt is currently staying at [facility] ([facility] Main concerns  "today around Pt's blood pressure level, was admitted to ER/ED twice in January. Pt has also been experiencing abdominal pain every morning. Pt is taking action with attending Day Treatment 3 days/wk. Reports that she has been sleeping well since starting the C-pap, he appetite is good, usually eats healthy at [facility]. She notices that her mental health has been improving and contributes this to her action in taking her medication, sleeping well and making it to her medical appointments. She reports that it has been a couple days since she has not seen the \"little zunilda.\" (hallucinations.)  She feels \"bubbly\" and has not felt this way in a long time. [facility] provided psychoeducation on the impact that sleep has on an individual. Pt maintaining her sobriety from chemical use; she is challenged with finding time with son when he is not with his girlfriend who drinks alcohol. She states this can limit her ability to see her son.       Progress on Treatment Objective(s) / Homework:  Minimal progress - ACTION (Actively working towards change); Intervened by reinforcing change plan / affirming steps taken    Motivational Interviewing    MI Intervention: Expressed Empathy/Understanding, Supported Autonomy, Collaboration, Evocation, Permission to raise concern or advise, Open-ended questions and Reflections: simple and complex     Change Talk Expressed by the Patient: Desire to change Ability to change Reasons to change Need to change Committment to change Activation Taking steps    Provider Response to Change Talk: E - Evoked more info from patient about behavior change, A - Affirmed patient's thoughts, decisions, or attempts at behavior change, R - Reflected patient's change talk and S - Summarized patient's change talk statements    Also provided psychoeducation about behavioral health condition, symptoms, and treatment options    Care Plan review completed: No    Medication Review:  See medication list    Medication " Compliance:  NA    Changes in Health Issues:   Yes: please refer to PCP note on same day and same time    Chemical Use Review:   Substance Use: Chemical use reviewed, no active concerns identified      Tobacco Use: No current tobacco use.      Assessment: Current Emotional / Mental Status (status of significant symptoms):  Risk status (Self / Other harm or suicidal ideation)  Patient has had a history of suicidal ideation: yes in the past  Patient denies current fears or concerns for personal safety.  Patient denies current or recent suicidal ideation or behaviors.  Patient denies current or recent homicidal ideation or behaviors.  Patient denies current or recent self injurious behavior or ideation.  Patient denies other safety concerns.  A safety and risk management plan has not been developed at this time, however patient was encouraged to call [place] [phone] should there be a change in any of these risk factors.    Appearance:   Appropriate   Eye Contact:   Good   Psychomotor Behavior: Normal   Attitude:   Cooperative   Orientation:   All  Speech   Rate / Production: Normal    Volume:  Normal   Mood:    Normal  Affect:    Appropriate  Bright   Thought Content:  Clear   Thought Form:  Coherent  Logical   Insight:    Fair     Diagnoses:  1. Major depressive disorder, recurrent episode, moderate (H)    2. Schizoaffective disorder (H)    3. Cocaine abuse, episodic        Collateral Reports Completed:  Not Applicable    Plan: (Homework, other):  Patient was given information about behavioral services and encouraged to schedule a follow up appointment with the clinic [facility] as needed.  She was also given information about mental health symptoms and treatment options .  CD Recommendations: Maintain Sobriety.      Richardson Lopez [facility], [facility] February 3, 2017

## 2017-02-03 NOTE — MR AVS SNAPSHOT
After Visit Summary   2/3/2017    Nena Tang    MRN: [id number]           Patient Information     Date Of Birth          1960        Visit Information        Provider Department      2/3/2017 10:30 AM Usman Hodgson PA-C [facility] Integrated [facility]        Today's Diagnoses     Type 2 diabetes mellitus with other specified complication (H)    -  1     Diabetic autonomic neuropathy associated with type 2 diabetes mellitus (H)         Screen for colon cancer         Coronary artery disease involving native heart with angina pectoris, unspecified vessel or lesion type (H)         Morbid obesity due to excess calories (H)         HTN, goal below 140/90            Follow-ups after your visit        Additional Services     BARIATRIC ADULT REFERRAL       Your provider has referred you to: [facility]: [facility] [phone]   [email or web address]    Please be aware that coverage of these services is subject to the terms and limitations of your health insurance plan.  Call member services at your health plan with any benefit or coverage questions.      Please bring the following with you to your appointment:      (1) List of current medications   (2) This referral request   (3) Any documents/labs given to you for this referral            GASTROENTEROLOGY ADULT REF PROCEDURE ONLY       Last Lab Result: CREATININE (mg/dL)       Date                     Value                 01/24/2017               0.94             ----------  Body mass index is 43.55 kg/(m^2).     Needed:  No  Language:  English    Patient will be contacted to schedule procedure.     Please be aware that coverage of these services is subject to the terms and limitations of your health insurance plan.  Call member services at your health plan with any benefit or coverage questions.  Any procedures must be performed at a  [facility] OR coordinated by your clinic's referral office.    Please bring the following with you to your appointment:    (1) Any X-Rays, CTs or MRIs which have been performed.  Contact the facility where they were done to arrange for  prior to your scheduled appointment.    (2) List of current medications   (3) This referral request   (4) Any documents/labs given to you for this referral            PODIATRY/FOOT & ANKLE SURGERY REFERRAL       Your provider has referred you to: Outside provider      Please be aware that coverage of these services is subject to the terms and limitations of your health insurance plan.  Call member services at your health plan with any benefit or coverage questions.      Please bring the following to your appointment:  >>   Any x-rays, CTs or MRIs which have been performed.  Contact the facility where they were done to arrange for  prior to your scheduled appointment.    >>   List of current medications   >>   This referral request   >>   Any documents/labs given to you for this referral                  Your next 10 appointments already scheduled     Feb 06, 2017  1:00 PM   Treatment with ADULT MH DAY 4C   [facility] ([facility])    [facility] [address] [address]   [phone]            Feb 07, 2017  1:00 PM   Treatment with ADULT MH DAY 4C   [facility] ([facility])    [facility] [address] [address]   [phone]            Feb 09, 2017  1:00 PM   Treatment with ADULT MH DAY 4C   [facility] ([facility])    [address] [address]   [phone]            Feb 13, 2017  1:00 PM   Treatment with ADULT MH DAY 4C   [facility] ([facility])    [facility]  [address] [address]   [phone]            Feb 14, 2017  1:00 PM   Treatment with ADULT MH DAY 4C   [facility] ([facility])    [address] [address]   [phone]            Feb 16, 2017  1:00 PM   Treatment with ADULT MH DAY 4C   [facility] ([facility])    [address] [address]   [phone]            Feb 20, 2017  1:00 PM   Treatment with ADULT MH DAY 4C   [facility] ([facility])    [address] [address]   [phone]            Feb 21, 2017  1:00 PM   Treatment with ADULT MH DAY 4C   [facility] ([facility])    [facility] [address] [address]   [phone]            Feb 23, 2017  1:00 PM   Treatment with ADULT MH DAY 4C   [facility] ([facility])    [facility] [address] [address]   [phone]            Feb 27, 2017  1:00 PM   Treatment with ADULT MH DAY 4C   [facility] ([facility])    [facility] [address] [address]   [phone]              Who to contact     If you have questions or need follow up information about today's clinic visit or your schedule please contact [facility] directly at [phone].  Normal or non-critical lab and imaging results will be communicated to you by MyChart, letter or phone within 4 business days after the clinic has received the results. If you do not hear from us within 7 days, please contact the clinic through MyChart or phone. If you have a critical or abnormal lab result, we will notify you by phone as soon as  possible.  Submit refill requests through [employer] or call your pharmacy and they will forward the refill request to us. Please allow 3 business days for your refill to be completed.          Additional Information About Your Visit        [employer] Information     [employer] gives you secure access to your electronic health record. If you see a primary care provider, you can also send messages to your care team and make appointments. If you have questions, please call your primary care clinic.  If you do not have a primary care provider, please call [phone] and they will assist you.        Care EveryWhere ID     This is your Care EveryWhere ID. This could be used by other organizations to access your [place] medical records  [id number]        Your Vitals Were     Pulse Temperature Respirations Height BMI (Body Mass Index) Pulse Oximetry    80 98.4  F (36.9  C) (Oral) 16 5' (1.524 m) 43.55 kg/m2 96%    Last Period                   01/06/2015            Blood Pressure from Last 3 Encounters:   02/03/17 142/82   01/27/17 165/91   01/27/17 170/89    Weight from Last 3 Encounters:   02/03/17 223 lb (101.152 kg)   01/27/17 224 lb (101.606 kg)   01/27/17 210 lb (95.255 kg)              We Performed the Following     BARIATRIC ADULT REFERRAL     DEPRESSION ACTION PLAN (DAP) Order [[id number]]     GASTROENTEROLOGY ADULT REF PROCEDURE ONLY     PODIATRY/FOOT & ANKLE SURGERY REFERRAL          Today's Medication Changes          These changes are accurate as of: 2/3/17 12:10 PM.  If you have any questions, ask your nurse or doctor.               Start taking these medicines.        Dose/Directions    dulaglutide 1.5 MG/0.5ML pen   Commonly known as:  TRULICITY   Used for:  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, with long-term current use of insulin, unspecified laterality (H)   Replaces:  dulaglutide 0.75 MG/0.5ML pen   Started by:  Kenzie Sheehan        Dose:  1.5 mg    Inject 1.5 mg Subcutaneous every 7 days   Quantity:  2 mL   Refills:  1         These medicines have changed or have updated prescriptions.        Dose/Directions    gabapentin 300 MG capsule   Commonly known as:  NEURONTIN   This may have changed:  when to take this   Used for:  Type 2 diabetes mellitus with diabetic neuropathy, with long-term current use of insulin (H)        Dose:  600 mg   Take 2 capsules (600 mg) by mouth 3 times daily   Quantity:  180 capsule   Refills:  2         Stop taking these medicines if you haven't already. Please contact your care team if you have questions.     dulaglutide 0.75 MG/0.5ML pen   Commonly known as:  TRULICITY   Replaced by:  dulaglutide 1.5 MG/0.5ML pen   Stopped by:  Kenzie Sheehan                Where to get your medicines      These medications were [place] to [facility] [place] [address], [place] [address]     Phone:  [phone]    - dulaglutide 1.5 MG/0.5ML pen  - losartan 100 MG tablet             Primary Care Provider    None Specified       No primary provider on file.        Goals        Patient-Stated    Medical     Goal Comments - Note created  7/7/2016  9:15 AM by Chelsea Pulido, RN    Get blood sugars under control    Improve medication compliance    As of today's date 7/7/2016 goal is met at 0 - 25%.   Goal Status:  Active          Thank you!     Thank you for choosing [facility]  for your care. Our goal is always to provide you with excellent care. Hearing back from our patients is one way we can continue to improve our services. Please take a few minutes to complete the written survey that you may receive in the mail after your visit with us. Thank you!             Your Updated Medication List - Protect others around you: Learn how to safely use, store and throw away your medicines at www.disposemymeds.org.          This list is accurate as of: 2/3/17  12:10 PM.  Always use your most recent med list.                   Brand Name Dispense Instructions for use    acetaminophen 500 MG tablet    TYLENOL    100 tablet    Take 2 tablets (1,000 mg) by mouth every 6 hours as needed for pain       aspirin 81 MG EC tablet    ASPIRIN LOW DOSE    100 tablet    Take 1 tablet (81 mg) by mouth daily       atorvastatin 80 MG tablet    LIPITOR    30 tablet    Take 1 tablet (80 mg) by mouth daily       benztropine 1 MG tablet    COGENTIN    60 tablet    Take 1 tablet (1 mg) by mouth 2 times daily       blood glucose monitoring lancets     1 Box    Use to test blood sugar 2 times daily or as directed.  Ok to substitute alternative if insurance prefers.       blood glucose monitoring test strip    ONE TOUCH ULTRA    1 Box    Check blood sugar 2 times a day as directed, One Touch Ultra Blue Test Strips Please match with Pt's Insurance and meter.       cholecalciferol 43552 UNITS capsule    VITAMIN D3    7 capsule    Take 1 capsule (50,000 Units) by mouth once a week FOR LOW VITAMIN D       citalopram 20 MG tablet    celeXA    30 tablet    Take 1 tablet (20 mg) by mouth daily       dulaglutide 1.5 MG/0.5ML pen    TRULICITY    2 mL    Inject 1.5 mg Subcutaneous every 7 days       gabapentin 300 MG capsule    NEURONTIN    180 capsule    Take 2 capsules (600 mg) by mouth 3 times daily       glucose 4 G Chew chewable tablet     20 tablet    Take 2 every 15 minutes for blood sugar <70mg/dL. Recheck blood sugar every 15 minutes until above 70mg/dL, then eat a substantial meal.       haloperidol 5 MG tablet    HALDOL    30 tablet    Take 1 tablet (5 mg) by mouth At Bedtime       ibuprofen 600 MG tablet    ADVIL/MOTRIN    60 tablet    Take 1 tablet (600 mg) by mouth every 4 hours as needed for moderate pain       insulin aspart 100 UNIT/ML injection    NovoLOG PEN    1 Month    Inject 20 Units Subcutaneous 3 times daily (with meals) Inject an extra 7 units once daily for blood sugars over  500mg/dL. Call the clinic if recheck is over 500mg/dL.       insulin glargine 100 UNIT/ML injection    LANTUS    12 mL    Inject 45 Units Subcutaneous At Bedtime       losartan 100 MG tablet    COZAAR    30 tablet    Take 1 tablet (100 mg) by mouth daily       melatonin 5 MG Caps     90 capsule    Take 1 capsule by mouth daily At dinnertime       metoprolol 100 MG 24 hr tablet    TOPROL-XL    30 tablet    Take 1 tablet (100 mg) by mouth daily       omeprazole 40 MG capsule    priLOSEC    30 capsule    Take 1 capsule (40 mg) by mouth daily       * order for DME     1 Device    Equipment being ordered: Rolling walker       * order for DME     1 each    Hold Novolog if meals are refused.       senna-docusate 8.6-50 MG per tablet    SENOKOT-S;PERICOLACE    100 tablet    Take 1 tablet by mouth 2 times daily as needed for constipation       * Notice:  This list has 2 medication(s) that are the same as other medications prescribed for you. Read the directions carefully, and ask your doctor or other care provider to review them with you.

## 2017-02-03 NOTE — MR AVS SNAPSHOT
After Visit Summary   2/3/2017    Nena Tang    MRN: [id number]           Patient Information     Date Of Birth          1960        Visit Information        Provider Department      2/3/2017 11:30 AM Kenzie Sheehan [facility]        Today's Diagnoses     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, with long-term current use of insulin, unspecified laterality (H)    -  1       Care Instructions    Recommendations from today's MTM visit:                                                      1. Have a big glass of water in the morning to see if this helps with your stomach pains.     2. Increase Trulicity to 1.5mg weekly. If your stomach pain gets worse, give me a call!    Next MTM visit: 1 month    To schedule another MTM appointment, please call the clinic directly or you may call the MTM scheduling line at [phone] or toll-free at [phone].     My Clinical Pharmacist's contact information:                                                      It was a pleasure seeing you today!  Please feel free to contact me with any questions or concerns you have.      Kenzie Sheehan, PharmD  Medication Therapy Management Pharmacist  Pager: [phone]    You may receive a survey about the MTM services you received.  I would appreciate your feedback to help me serve you better in the future. Please fill it out and return it when you can. Your comments will be anonymous.      My healthcare goals:                                                      Diabetes Goals:    Home Monitoring of Blood Sugars:Fasting  mg/dL and 2 hours after a meal less than 150 mg/dL.    Hemoglobin A1C: Less than 7%. Yours is A1C      7.1   1/27/2017.    Blood Pressure: Less than 140/90mmHg.    Cholesterol: You are taking atovastatin to help decrease the risk of heart disease.    Things you can do to help lower your blood  sugars:    Diet: 3 meals and 1-2 snacks per day with 45-60 grams of carbohydrates per meal and 15-30 grams of carbohydrates per snack. Try to fill your plate at least half-full with vegetables, fill one-quarter full with lean meats or protein, and also make sure you get at least some carbohydrate with every meal.    Exercise: 30 minutes per day of anything that will increase your heart rate and make you break a sweat! Gardening, walking, cleaning the house, changing the oil in your car, etc. If you feel like 30 minutes per day is too much, start small. Even lifting canned foods or working your arms with a resistance band in front of the TV can help.              Follow-ups after your visit        Your next 10 appointments already scheduled     Feb 06, 2017  1:00 PM   Treatment with ADULT MH DAY 4C [facility] ([facility])    [address] [place] [address]   [phone]            Feb 07, 2017  1:00 PM   Treatment with ADULT MH DAY 4C [facility] ([facility])    [address] [place] [address]   [phone]            Feb 09, 2017  1:00 PM   Treatment with ADULT MH DAY 4C [facility] ([facility])    [address] [place] [address]   [phone]            Feb 13, 2017  1:00 PM   Treatment with ADULT MH DAY 4C [facility] ([facility])    [address] [place] [address]   [phone]            Feb 14, 2017  1:00 PM   Treatment with ADULT MH DAY 4C [facility] ([facility])    [address] [place] [address]   [phone]            Feb 16, 2017  1:00 PM   Treatment with [facility]ULT [address] [facility] ([facility])    [address] [address]   [phone]            Feb 20, 2017  1:00 PM   Treatment with ADULT MH DAY 4C   [facility] ([facility])    [address] [address]   [phone]            Feb 21, 2017  1:00 PM   Treatment with ADULT MH DAY 4C   [facility] ([facility])    [place] [address] [address]   [phone]            Feb 23, 2017  1:00 PM   Treatment with ADULT MH DAY 4C   [facility] ([facility])    [address] [address]   [phone]            Feb 27, 2017  1:00 PM   Treatment with ADULT MH DAY 4C   [facility] [address] [address]   [phone]              Who to contact     If you have questions or need follow up information about today's clinic visit or your schedule please contact [facility] MTM directly at [phone].  Normal or non-critical lab and imaging results will be communicated to you by MyChart, letter or phone within 4 business days after the clinic has received the results. If you do not hear from us within 7 days, please contact the clinic through [email or web address] or phone. If you have a critical or abnormal lab result, we will notify you by phone as soon as possible.  Submit refill requests through [employer] or call your pharmacy and they will forward the refill request to us. Please allow 3 business days for your refill to be completed.          Additional Information About Your Visit        [employer] Information     [employer] gives you secure access to your electronic health record. If you see a primary care  provider, you can also send messages to your care team and make appointments. If you have questions, please call your primary care clinic.  If you do not have a primary care provider, please call [phone] and they will assist you.        Care EveryWhere ID     This is your Care EveryWhere ID. This could be used by other organizations to access your [facility] medical records  [id number]        Your Vitals Were     Last Period                   01/06/2015            Blood Pressure from Last 3 Encounters:   02/03/17 142/82   01/27/17 165/91   01/27/17 170/89    Weight from Last 3 Encounters:   02/03/17 223 lb (101.152 kg)   01/27/17 224 lb (101.606 kg)   01/27/17 210 lb (95.255 kg)              Today, you had the following     No orders found for display         Today's Medication Changes          These changes are accurate as of: 2/3/17 12:04 PM.  If you have any questions, ask your nurse or doctor.               Start taking these medicines.        Dose/Directions    dulaglutide 1.5 MG/0.5ML pen   Commonly known as:  TRULICITY   Used for:  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, with long-term current use of insulin, unspecified laterality (H)   Replaces:  dulaglutide 0.75 MG/0.5ML pen   Started by:  Kenzie Sheehan        Dose:  1.5 mg   Inject 1.5 mg Subcutaneous every 7 days   Quantity:  2 mL   Refills:  1         These medicines have changed or have updated prescriptions.        Dose/Directions    gabapentin 300 MG capsule   Commonly known as:  NEURONTIN   This may have changed:  when to take this   Used for:  Type 2 diabetes mellitus with diabetic neuropathy, with long-term current use of insulin (H)        Dose:  600 mg   Take 2 capsules (600 mg) by mouth 3 times daily   Quantity:  180 capsule   Refills:  2         Stop taking these medicines if you haven't already. Please contact your care team if you have questions.     dulaglutide 0.75 MG/0.5ML  pen   Commonly known as:  TRULICITY   Replaced by:  dulaglutide 1.5 MG/0.5ML pen   Stopped by:  Kenzie Sheehan                Where to get your medicines      These medications [place] [facility] [place] [address], [place] [address]     Phone:  [phone]    - dulaglutide 1.5 MG/0.5ML pen             Primary Care Provider    None Specified       No primary provider on file.        Goals        Patient-Stated    Medical     Goal Comments - Note created  7/7/2016  9:15 AM by Chelsea Pulido, RN    Get blood sugars under control    Improve medication compliance    As of today's date 7/7/2016 goal is met at 0 - 25%.   Goal Status:  Active          Thank you!     Thank you for choosing [facility]  for your care. Our goal is always to provide you with excellent care. Hearing back from our patients is one way we can continue to improve our services. Please take a few minutes to complete the written survey that you may receive in the mail after your visit with us. Thank you!             Your Updated Medication List - Protect others around you: Learn how to safely use, store and throw away your medicines at www.disposemymeds.org.          This list is accurate as of: 2/3/17 12:04 PM.  Always use your most recent med list.                   Brand Name Dispense Instructions for use    acetaminophen 500 MG tablet    TYLENOL    100 tablet    Take 2 tablets (1,000 mg) by mouth every 6 hours as needed for pain       aspirin 81 MG EC tablet    ASPIRIN LOW DOSE    100 tablet    Take 1 tablet (81 mg) by mouth daily       atorvastatin 80 MG tablet    LIPITOR    30 tablet    Take 1 tablet (80 mg) by mouth daily       benztropine 1 MG tablet    COGENTIN    60 tablet    Take 1 tablet (1 mg) by mouth 2 times daily       blood glucose monitoring lancets     1 Box    Use to test blood sugar 2 times daily or as directed.  Ok to  substitute alternative if insurance prefers.       blood glucose monitoring test strip    ONE TOUCH ULTRA    1 Box    Check blood sugar 2 times a day as directed, One Touch Ultra Blue Test Strips Please match with Pt's Insurance and meter.       cholecalciferol 71068 UNITS capsule    VITAMIN D3    7 capsule    Take 1 capsule (50,000 Units) by mouth once a week FOR LOW VITAMIN D       citalopram 20 MG tablet    celeXA    30 tablet    Take 1 tablet (20 mg) by mouth daily       dulaglutide 1.5 MG/0.5ML pen    TRULICITY    2 mL    Inject 1.5 mg Subcutaneous every 7 days       gabapentin 300 MG capsule    NEURONTIN    180 capsule    Take 2 capsules (600 mg) by mouth 3 times daily       glucose 4 G Chew chewable tablet     20 tablet    Take 2 every 15 minutes for blood sugar <70mg/dL. Recheck blood sugar every 15 minutes until above 70mg/dL, then eat a substantial meal.       haloperidol 5 MG tablet    HALDOL    30 tablet    Take 1 tablet (5 mg) by mouth At Bedtime       ibuprofen 600 MG tablet    ADVIL/MOTRIN    60 tablet    Take 1 tablet (600 mg) by mouth every 4 hours as needed for moderate pain       insulin aspart 100 UNIT/ML injection    NovoLOG PEN    1 Month    Inject 20 Units Subcutaneous 3 times daily (with meals) Inject an extra 7 units once daily for blood sugars over 500mg/dL. Call the clinic if recheck is over 500mg/dL.       insulin glargine 100 UNIT/ML injection    LANTUS    12 mL    Inject 45 Units Subcutaneous At Bedtime       losartan 100 MG tablet    COZAAR    30 tablet    Take 1 tablet (100 mg) by mouth daily       melatonin 5 MG Caps     90 capsule    Take 1 capsule by mouth daily At dinnertime       metoprolol 100 MG 24 hr tablet    TOPROL-XL    30 tablet    Take 1 tablet (100 mg) by mouth daily       omeprazole 40 MG capsule    priLOSEC    30 capsule    Take 1 capsule (40 mg) by mouth daily       * order for DME     1 Device    Equipment being ordered: Rolling walker       * order for DME     1  each    Hold Novolog if meals are refused.       senna-docusate 8.6-50 MG per tablet    SENOKOT-S;PERICOLACE    100 tablet    Take 1 tablet by mouth 2 times daily as needed for constipation       * Notice:  This list has 2 medication(s) that are the same as other medications prescribed for you. Read the directions carefully, and ask your doctor or other care provider to review them with you.

## 2017-02-04 ASSESSMENT — PATIENT HEALTH QUESTIONNAIRE - PHQ9: SUM OF ALL RESPONSES TO PHQ QUESTIONS 1-9: 0

## 2017-02-04 ASSESSMENT — ANXIETY QUESTIONNAIRES: GAD7 TOTAL SCORE: 0

## 2017-02-06 ENCOUNTER — DOCUMENTATION ONLY (OUTPATIENT)
Dept: SLEEP MEDICINE | Facility: CLINIC | Age: 57
End: 2017-02-06

## 2017-02-06 NOTE — PROGRESS NOTES
Patient was seen at [facility] for a refresher on cpap equipment, went over supplies and how to use her cpap machine. She went home with new equipment and stated she felt comfortable using the machine on her own.

## 2017-02-07 ENCOUNTER — HOSPITAL ENCOUNTER (OUTPATIENT)
Dept: BEHAVIORAL HEALTH | Facility: CLINIC | Age: 57
End: 2017-02-07
Attending: PSYCHOLOGIST
Payer: MEDICARE

## 2017-02-07 PROCEDURE — H2012 BEHAV HLTH DAY TREAT, PER HR: HCPCS

## 2017-02-07 NOTE — PROGRESS NOTES
Group Therapy Progress Notes     Area of Treatment Focus:  Symptom Management, Personal Safety, Community Resources/Discharge Planning, Abstinence/Relapse Prevention, Develop/Improve Independent Living/Socialization Skills/Interpersonal Relationships and Cultural/Racial/Health and Spiritual    Therapeutic Interventions/Treatment Strategies:  Support, Redirection, Feedback, Limit/Boundaries, Safety Assessments, Structured Activity, Problem Solving, Clarification, Education, Motivational Enhancement Therapy, Cognitive Behavioral Therapy and Structured Activity    Response to Treatment Strategies:  Accepted Feedback, Gave Feedback, Listened, Focused on Goals, Attentive, Accepted Support, Alert, Demonstrates Behavior Change    Name of Group:  4C Group Therapy and Mental Health Management     Progress Note   The client reported being safe today.  The group discussed different areas of their lives, and how they are able to care for themselves, get out in the community, and their feelings for the past few days.     The following was reported:     Sleep:   Nena reported better sleep with the C-Pap  Medication:  As prescribed  Concentration: ok  Appetite:  Ok,   Interest level and activities: She went to her son's house to visit, over the weekend.  She helped with her daughter-in-law     Who had medical problems and was vomiting. Her son called 911 and she is in the hospital still.    Social contacts: Son, grandkids, staff  Feelings:   anxious  Psychosis:  She reported some problems with hearing voices    Skills used:  Sobriety, keeping appointments, resting, and wearing the heart monitor     Mental Health Management   The group participated in a discussion about suicidal thoughts, feelings, and skills.  The group talked about different ways to manage the skills and how to build supports.  The group worked on a structured activity to learn skills about identifying feelings, using distraction skills and wise mind.  The  group listened to the sound of ocean waves and did a small meditation.  The group discussion resulted in them learning a new skill to apply to their daily skills.        Is this a Weekly Review of the Progress on the Treatment Plan?  Yes.      Are Treatment Plan Goals being addressed?  Yes, continue treatment goals      Are Treatment Plan Strategies to Address Goals Effective?  Yes, continue treatment strategies      Are there any current contracts in place?  No

## 2017-02-08 NOTE — PROGRESS NOTES
Group Therapy Progress Notes     Area of Treatment Focus:  Symptom Management    Therapeutic Interventions/Treatment Strategies:  Support, Feedback, Safety Assessments, Clarification Structured Activity and Education    Response to Treatment Strategies:  Accepted Feedback, Listened, Attentive, Accepted Support and Alert    Name of Group:  Life Skills    Progress Note  Client presented with calm,even mood.Good focus and concentration during a discussion related to assertive communication which included a self assessment and practice of assertion response. Thought process clear,goal directed and reality based.Treatment goals not addressed.      Is this a Weekly Review of the Progress on the Treatment Plan?  Yes.      Are Treatment Plan Goals being addressed?  Yes, continue treatment goals      Are Treatment Plan Strategies to Address Goals Effective?  Yes, continue treatment strategies      Are there any current contracts in place?  No

## 2017-02-09 ENCOUNTER — HOSPITAL ENCOUNTER (OUTPATIENT)
Dept: BEHAVIORAL HEALTH | Facility: CLINIC | Age: 57
End: 2017-02-09
Attending: PSYCHOLOGIST
Payer: MEDICARE

## 2017-02-09 PROCEDURE — H2012 BEHAV HLTH DAY TREAT, PER HR: HCPCS

## 2017-02-09 NOTE — PROGRESS NOTES
Group Therapy Progress Notes     Area of Treatment Focus:  Symptom Management, Personal Safety, Community Resources/Discharge Planning, Abstinence/Relapse Prevention, Develop/Improve Independent Living/Socialization Skills/Interpersonal Relationships and Cultural/Racial/Health and Spiritual    Therapeutic Interventions/Treatment Strategies:  Support, Redirection, Feedback, Limit/Boundaries, Safety Assessments, Structured Activity, Problem Solving, Clarification, Education, Motivational Enhancement Therapy, Cognitive Behavioral Therapy and Structured Activity    Response to Treatment Strategies:  Accepted Feedback, Gave Feedback, Listened, Focused on Goals, Attentive, Accepted Support, Alert, Demonstrates Behavior Change    Name of Group:  4C Group Therapy      Progress Note   The client reported being safe today.  The group discussed different areas of their lives, and how they are able to care for themselves, get out in the community, and their feelings for the past few days.     The following was reported:    Nena    Sleep:    Better with the C-Pap machine  Medication:   As prescribed  Concentration:  alright  Appetite:   ok  Interest level and activities: She reported an argument with other house-mates, and that she wasn't going to get into the argument      And will say hello, but keep out of other things.She said she doesn't understand why they are      So crabby.  The staff talked to her about it.  Social contacts:  House-mates, son  Feelings:    Upset, and said that her hands have cramps in them  Psychosis:   She continues to see a shadow man that comes in the dark, and so she keeps a light on in      Her room, but still feels afraid.    Skills used:   She listens to music, and was watching TV with the other house-mates, but may not, since they      Are picking at her.  She has kept her doctor's appointments.      She helped her son get her daughter in law to the hospital, and said that she had a  seizure.           Is this a Weekly Review of the Progress on the Treatment Plan?  Yes.      Are Treatment Plan Goals being addressed?  Yes, continue treatment goals      Are Treatment Plan Strategies to Address Goals Effective?  Yes, continue treatment strategies      Are there any current contracts in place?  No

## 2017-02-10 NOTE — PROGRESS NOTES
Group Therapy Progress Notes   Treatment Goal:                                                                                       When in life skills client will learn and practice 1-2 strategies to help with better management of stress providing an update of progress weekly.    Area of Treatment Focus:  Symptom Management    Therapeutic Interventions/Treatment Strategies:  Support, Feedback, Safety Assessments, Clarification Structured Activity and Education    Response to Treatment Strategies:  Accepted Feedback, Listened, Attentive, Accepted Support and Alert    Name of Group:  Life Skills    Progress Note  Client presented with calm,even mood.Good focus and concentration during a discussion related to accepting responsibility for personal actions. Thought process clear,goal directed and reality based.Per goal client reported that she continues to cope effectively cope with interpersonal stress with her family and the other residents she lives with.      Is this a Weekly Review of the Progress on the Treatment Plan?  Yes.      Are Treatment Plan Goals being addressed?  Yes, continue treatment goals      Are Treatment Plan Strategies to Address Goals Effective?  Yes, continue treatment strategies      Are there any current contracts in place?  No

## 2017-02-13 ENCOUNTER — HOSPITAL ENCOUNTER (OUTPATIENT)
Dept: BEHAVIORAL HEALTH | Facility: CLINIC | Age: 57
End: 2017-02-13
Attending: PSYCHOLOGIST
Payer: MEDICARE

## 2017-02-13 PROCEDURE — H2012 BEHAV HLTH DAY TREAT, PER HR: HCPCS

## 2017-02-13 NOTE — PROGRESS NOTES
Group Therapy Progress Notes     Area of Treatment Focus:  Symptom Management, Personal Safety, Community Resources/Discharge Planning, Abstinence/Relapse Prevention, Develop/Improve Independent Living/Socialization Skills/Interpersonal Relationships and Cultural/Racial/Health and Spiritual    Therapeutic Interventions/Treatment Strategies:  Support, Redirection, Feedback, Limit/Boundaries, Safety Assessments, Structured Activity, Problem Solving, Clarification, Education, Motivational Enhancement Therapy, Cognitive Behavioral Therapy and Structured Activity    Response to Treatment Strategies:  Accepted Feedback, Gave Feedback, Listened, Focused on Goals, Attentive, Accepted Support, Alert, Demonstrates Behavior Change    Name of Group:  4C Group Therapy and Mental Health Management     Progress Note   Nena reported being safe today.  The group discussed different areas of their lives, and how they are able to care for themselves, get out in the community, and their feelings for the past few days.     The following was reported:  Sleep:  Better with the C-Pap machine  Medication: As prescribed  Concentration:  ok  Appetite:  good  Interest level and activities: She went to a movie with her house-mates, and went out to dinner with her son and grandchild.    She talked to the house-mate, who was rude and that person apologized.  Social contacts: She saw her son, house-mates, staff  Feelings:  She reported feeling better about the situation with the house-mate.  Psychosis: She reported ongoing problems with the shadow man, who appears at night, and she leaves the light on to    Help her feel safer. It scares her.  Skills used: Reality Checks, being patient, asserting her own feelings and thoughts     Is this a Weekly Review of the Progress on the Treatment Plan?  Yes.      Are Treatment Plan Goals being addressed?  Yes, continue treatment goals      Are Treatment Plan Strategies to Address Goals Effective?  Yes,  continue treatment strategies      Are there any current contracts in place?  No

## 2017-02-14 ENCOUNTER — HOSPITAL ENCOUNTER (OUTPATIENT)
Dept: BEHAVIORAL HEALTH | Facility: CLINIC | Age: 57
End: 2017-02-14
Attending: PSYCHOLOGIST
Payer: MEDICARE

## 2017-02-14 PROCEDURE — H2012 BEHAV HLTH DAY TREAT, PER HR: HCPCS

## 2017-02-14 NOTE — PROGRESS NOTES
Group Therapy Progress Notes     Area of Treatment Focus:  Symptom Management, Personal Safety, Community Resources/Discharge Planning, Abstinence/Relapse Prevention, Develop/Improve Independent Living/Socialization Skills/Interpersonal Relationships and Cultural/Racial/Health and Spiritual    Therapeutic Interventions/Treatment Strategies:  Support, Redirection, Feedback, Limit/Boundaries, Safety Assessments, Structured Activity, Problem Solving, Clarification, Education, Motivational Enhancement Therapy, Cognitive Behavioral Therapy and Structured Activity    Response to Treatment Strategies:  Accepted Feedback, Gave Feedback, Listened, Focused on Goals, Attentive, Accepted Support, Alert, Demonstrates Behavior Change    Name of Group:  4[facility] Group Therapy and Mental Health Management     Progress Note   The client reported being safe today.  The group discussed different areas of their lives, and how they are able to care for themselves, get out in the community, and their feelings for the past few days.     The following was reported:   Nena:  Sleep:   Good, and she is using her C-Pap machine    Medication:  As prescribed  Concentration:  ok  Appetite:  ok  Interest level and activities: She eats supper with her house-mates and watches TV with them. She calls her son and plans     To visit with him regularly.    Social contacts: Family, staff, house-mates  Feelings:   She reported feeling better, after the other house-mate apologized for their argument. Nena talked      Openly with this person.    Psychosis:  She continues to see a shadow man at night, and keeps a light on in the room to feel safe.  Skills used:  Reality Checks, distractions, keeping appointments, doing self-cares.       Mental Health Management   The group participated in a discussion about different ways to make themselves feel more at ease.  The group talked about different ways to use mindfulness and learn about things that make them  happy or sad, or change their mood.  The group worked on a structured activity to learn skills about different ways that they think and act.  The group discussion resulted in them learning a new skill to apply to their daily skills.  Nena participated in the mindfulness exercise and said that hot baths are helpful.      Is this a Weekly Review of the Progress on the Treatment Plan?  Yes.      Are Treatment Plan Goals being addressed?  Yes, continue treatment goals      Are Treatment Plan Strategies to Address Goals Effective?  Yes, continue treatment strategies      Are there any current contracts in place?  No

## 2017-02-14 NOTE — PROGRESS NOTES
Group Therapy Progress Notes   Treatment Goal:                                                                                       Stress management,improve self esteem,improve motivation    Area of Treatment Focus:  Symptom Management    Therapeutic Interventions/Treatment Strategies:  Support, Feedback, Safety Assessments, Clarification Structured Activity and Education    Response to Treatment Strategies:  Accepted Feedback, Listened, Attentive, Accepted Support and Alert    Name of Group:  Life Skills    Progress Note  Client presented with calm,even mood.Good focus and concentration during a discussion related to stress management with a focus on building resiliency skills to mange stress more effectively. Thought process clear,goal directed and reality based.      Is this a Weekly Review of the Progress on the Treatment Plan?  Yes.      Are Treatment Plan Goals being addressed?  Yes, continue treatment goals      Are Treatment Plan Strategies to Address Goals Effective?  Yes, continue treatment strategies      Are there any current contracts in place?  No

## 2017-02-15 NOTE — PROGRESS NOTES
Group Therapy Progress Notes   Treatment Goal:                                                                                       Stress management,improve self esteem,improve motivation    Area of Treatment Focus:  Symptom Management    Therapeutic Interventions/Treatment Strategies:  Support, Feedback, Safety Assessments, Clarification Structured Activity and Education    Response to Treatment Strategies:  Accepted Feedback, Listened, Attentive, Accepted Support and Alert    Name of Group:  Life Skills    Progress Note  Client presented with calm,even mood.Good focus and concentration during a discussion related to communication skills with a focus on social support.Explained the benefits of [facility] and Community Support Programs.  Thought process clear,goal directed and reality based.      Is this a Weekly Review of the Progress on the Treatment Plan?  Yes.      Are Treatment Plan Goals being addressed?  Yes, continue treatment goals      Are Treatment Plan Strategies to Address Goals Effective?  Yes, continue treatment strategies      Are there any current contracts in place?  No

## 2017-02-16 ENCOUNTER — HOSPITAL ENCOUNTER (OUTPATIENT)
Dept: BEHAVIORAL HEALTH | Facility: CLINIC | Age: 57
End: 2017-02-16
Attending: PSYCHOLOGIST
Payer: MEDICARE

## 2017-02-16 PROCEDURE — H2012 BEHAV HLTH DAY TREAT, PER HR: HCPCS

## 2017-02-17 NOTE — PROGRESS NOTES
"Group Therapy Progress Notes   Client Initial Individualized Goals for Treatment:      \" Get help for my anxiety and depression.\"     See Initial Treatment suggestions for the client during the time between Diagnostic Assessment and completion of the Master Individualized Treatment Plan:     Treatment Goals:     1.Client will notify staff when needing assistance to develop or implement a coping plan to manage suicidal or self injurious urges.  2. When in life skills client will learn and practice 1-2 skills to help with better management of stress providing an update of weekly progress.  3. Will report on symptoms and identify skills to use to manage depression, anxiety levels, panic attacks, reduce and recognize Psychosis, and not respond to voices that tell her to harm herself.   4. Will develop an aftercare plan by scheduling an appointment and establishing care with a new psychiatrist    Area of Treatment Focus:  Symptom Management    Therapeutic Interventions/Treatment Strategies:  Support, Feedback, Safety Assessments, Clarification Structured Activity and Education    Response to Treatment Strategies:  Accepted Feedback, Listened, Attentive, Accepted Support and Alert    Name of Group:  Life Skills    Progress Note  Client presented with calm,even mood.Good focus and concentration during a discussion related to mary ann esteem with a focus on identifying life purpose. Thought process clear,goal directed and reality based.      Is this a Weekly Review of the Progress on the Treatment Plan?  Yes.      Are Treatment Plan Goals being addressed?  Yes, continue treatment goals      Are Treatment Plan Strategies to Address Goals Effective?  Yes, continue treatment strategies      Are there any current contracts in place?  No         "

## 2017-02-21 ENCOUNTER — HOSPITAL ENCOUNTER (OUTPATIENT)
Dept: BEHAVIORAL HEALTH | Facility: CLINIC | Age: 57
End: 2017-02-21
Attending: PSYCHOLOGIST
Payer: MEDICARE

## 2017-02-21 PROCEDURE — H2012 BEHAV HLTH DAY TREAT, PER HR: HCPCS

## 2017-02-21 NOTE — PROGRESS NOTES
"Adult Mental Health Outpatient Group Therapy Progress Note         Client Initial Individualized Goals for Treatment:      \" Get help for my anxiety and depression.\"     See Initial Treatment suggestions for the client during the time between Diagnostic Assessment and completion of the Master Individualized Treatment Plan:     Treatment Goals:     1.Client will notify staff when needing assistance to develop or implement a coping plan to manage suicidal or self injurious urges.  2. When in life skills client will learn and practice 1-2 skills to help with better management of stress providing an update of weekly progress.  3. Will report on symptoms and identify skills to use to manage depression, anxiety levels, panic attacks, reduce and recognize Psychosis, and not respond to voices that tell her to harm herself.   4. Will develop an aftercare plan by scheduling an appointment and establishing care with a new psychiatrist.     Area of Treatment Focus:  Symptom Management     Therapeutic Interventions/Treatment Strategies:  Support, Safety Assessments, Problem Solving, Clarification and Education     Response to Treatment Strategies:  Accepted Feedback, Attentive and Alert     Name of Group: 4C Group Therapy and Mental Health Management     Description and Outcome:     The client reported being safe today. The group discussed different areas of their lives, and how they are able to care for themselves, get out in the community, and their feelings for the past few days.   The following was reported:    Sleep:   Ok with C-Pap machine  Medication:  As prescribed  .  Concentration:  Watching TV, listening to music  Appetite:  Ok, but she gained 20# since moving to this new place  Interest level and activities: She ate supper with the other house-mates, and talked to her sons, and visited with her granddaughter  Social contacts: Staff, house-mates, new psychiatrist, sons  Feelings:   Depression, grief and loss, and she " found out that a family friend  over the weekend.     Psychosis:  She has a visual hallucination of a shadow man, and he comes at night. She keeps the light on and   When he appears, she yells at him.     Skills used:  She is practicing assertiveness with others, keeping appointments, resting       Mental Health Management:  The group participated in a structured activity that involved reading a worksheet about different aspects of their lives and writing in comments about positive things that they want to have, or feel that they have.  The group went around and discussed their ideas with each other.  The group members helped explain different life events  to others.  The group also worked on the small coping skills book, and completed the activity.     Is this a Weekly Review of the Progress on the Treatment Plan?  Yes.      Are Treatment Plan Goals being addressed?  Yes, continue treatment goals        Are Treatment Plan Strategies to Address Goals Effective?  Yes, continue treatment strategies        Are there any current contracts in place?  No

## 2017-02-23 ENCOUNTER — HOSPITAL ENCOUNTER (OUTPATIENT)
Dept: BEHAVIORAL HEALTH | Facility: CLINIC | Age: 57
End: 2017-02-23
Attending: PSYCHOLOGIST
Payer: MEDICARE

## 2017-02-23 PROCEDURE — H2012 BEHAV HLTH DAY TREAT, PER HR: HCPCS

## 2017-02-24 NOTE — PROGRESS NOTES
Psychiatry staffing: case discussed  Diagnosis:  Schizoaffective, etoh mild (sober), improved.    Current Outpatient Prescriptions   Medication     aspirin (ASPIRIN LOW DOSE) 81 MG EC tablet     omeprazole (PRILOSEC) 40 MG capsule     atorvastatin (LIPITOR) 80 MG tablet     losartan (COZAAR) 100 MG tablet     dulaglutide (TRULICITY) 1.5 MG/0.5ML pen     blood glucose monitoring (ONE TOUCH ULTRA) test strip     metoprolol (TOPROL-XL) 100 MG 24 hr tablet     insulin aspart (NOVOLOG PEN) 100 UNIT/ML soln     acetaminophen (TYLENOL) 500 MG tablet     ibuprofen (ADVIL,MOTRIN) 600 MG tablet     melatonin 5 MG CAPS     glucose 4 G CHEW     order for DME     insulin glargine (LANTUS) 100 UNIT/ML PEN     blood glucose monitoring (ONE TOUCH DELICA) lancets     citalopram (CELEXA) 20 MG tablet     cholecalciferol (VITAMIN D3) 60590 UNITS capsule     gabapentin (NEURONTIN) 300 MG capsule     haloperidol (HALDOL) 5 MG tablet     benztropine (COGENTIN) 1 MG tablet     order for DME     senna-docusate (SENOKOT-S;PERICOLACE) 8.6-50 MG per tablet     No current facility-administered medications for this encounter.      Past Medical History   Diagnosis Date     CAD (coronary artery disease)      5/2014 cath, nonbostructive stenosis to LAD, RCA.     Chronic low back pain 1/22/2013     Cocaine abuse, in remission      Fecal urgency 3/8/2012     History of heroin abuse      Hyperlipidemia LDL goal <100 10/31/2010     Hypertension 7/29/2013     Illiterate 8/30/2011     Irritable bowel syndrome      Migraine 4/19/2012     Migraine headache 4/22/2013     Moderate major depression (H) 6/8/2011     Noncompliance with medication regimen 6/8/2011     Obesity      CINDY (obstructive sleep apnea) 3/8/2012     uses CPAP     Osteopenia 10/7/2009     Schizoaffective disorder (H) 9/13/2011     Schizoaffective disorder, depressive type (H) 2/25/2013     Suicidal intent 10/2/2013     Takotsubo cardiomyopathy      Type 2 diabetes mellitus (H) 8/30/2011      Uterine cancer (H) 1983     Verbal auditory hallucination 10/4/2012     Vitamin B12 deficiency (non anaemic) 11/23/2009     Vitamin D deficiency 1/29/2009

## 2017-02-24 NOTE — PROGRESS NOTES
"Group Therapy Progress Notes   Client Initial Individualized Goals for Treatment:      \" Get help for my anxiety and depression.\"     See Initial Treatment suggestions for the client during the time between Diagnostic Assessment and completion of the Master Individualized Treatment Plan:     Treatment Goals:     1.Client will notify staff when needing assistance to develop or implement a coping plan to manage suicidal or self injurious urges.  2. When in life skills client will learn and practice 1-2 skills to help with better management of stress providing an update of weekly progress.  3. Will report on symptoms and identify skills to use to manage depression, anxiety levels, panic attacks, reduce and recognize Psychosis, and not respond to voices that tell her to harm herself.   4. Will develop an aftercare plan by scheduling an appointment and establishing care with a new psychiatrist    Area of Treatment Focus:  Symptom Management    Therapeutic Interventions/Treatment Strategies:  Support, Feedback, Safety Assessments, Clarification Structured Activity and Education    Response to Treatment Strategies:  Accepted Feedback, Listened, Attentive, Accepted Support and Alert    Name of Group:  Life Skills    Progress Note  Client presented with calm,even mood.Good focus and concentration during a discussion related to mary ann esteem with a focus on making effective decisions. Client reported that she was not looking forward to going to [place] for a  over the weekend, Thought process clear,goal directed and reality based.Client is also still trying to find a grief support group led by a therapist who will accept her insurance.      Is this a Weekly Review of the Progress on the Treatment Plan?  Yes.      Are Treatment Plan Goals being addressed?  Yes, continue treatment goals      Are Treatment Plan Strategies to Address Goals Effective?  Yes, continue treatment strategies      Are there any current contracts in " place?  No

## 2017-02-28 ENCOUNTER — TRANSFERRED RECORDS (OUTPATIENT)
Dept: HEALTH INFORMATION MANAGEMENT | Facility: CLINIC | Age: 57
End: 2017-02-28

## 2017-03-02 ENCOUNTER — HOSPITAL ENCOUNTER (OUTPATIENT)
Dept: BEHAVIORAL HEALTH | Facility: CLINIC | Age: 57
End: 2017-03-02
Attending: PSYCHOLOGIST
Payer: MEDICARE

## 2017-03-02 PROCEDURE — H2012 BEHAV HLTH DAY TREAT, PER HR: HCPCS

## 2017-03-02 NOTE — PROGRESS NOTES
"      Adult Mental Health Outpatient Group Therapy Progress Note     Client Initial Individualized Goals for Treatment:       \" Get help for my anxiety and depression.\"      See Initial Treatment suggestions for the client during the time between Diagnostic Assessment and completion of the Master Individualized Treatment Plan:      Treatment Goals:      1.Client will notify staff when needing assistance to develop or implement a coping plan to manage suicidal or self injurious urges.  2. When in life skills client will learn and practice 1-2 skills to help with better management of stress providing an update of weekly progress.  3. Will report on symptoms and identify skills to use to manage depression, anxiety levels, panic attacks, reduce and recognize Psychosis, and not respond to voices that tell her to harm herself.   4. Will develop an aftercare plan by scheduling an appointment and establishing care with a new psychiatrist.      Area of Treatment Focus:  Symptom Management      Therapeutic Interventions/Treatment Strategies:  Support, Safety Assessments, Problem Solving, Clarification and Education      Response to Treatment Strategies:  Accepted Feedback, Attentive and Alert      Name of Group: 4C Group Therapy a      Description and Outcome:      The client reported being safe today. The group discussed different areas of their lives, and how they are able to care for themselves, get out in the community, and their feelings for the past few days.   The following was reported:      Sleep: She didn't use her C Pap machine, but stated that she will do it tonight  Medication: As prescribed  .  Concentration: ok    Appetite: Ok,  Interest level and activities:  She went to a  in [place]. She drove with her sister and nieces.    They went for walks in [place] and here at her home.  She talked to her sons and saw her granddaughter.      Social contacts: Staff, house-mates, relatives  Feelings: Depression, " grief and loss, and she is having more suicidal thoughts and self-harm thoughts, since the    The group talked about survival guilt and how they managed it.      Psychosis: She has a visual hallucination of a shadow man, and he comes at night. She keeps the light on and   When he appears, she yells at him. She reported being scared of him. She said that she will ask him what he wants  And why he is there.  She reported problems with paranoia.      Skills used: She is practicing assertiveness with others, keeping appointments, resting, talking to others on the phone,   And setting boundaries. She reported that she will rest this weekend.             Is this a Weekly Review of the Progress on the Treatment Plan?  Yes.       Are Treatment Plan Goals being addressed?  Yes, continue treatment goals          Are Treatment Plan Strategies to Address Goals Effective?  Yes, continue treatment strategies          Are there any current contracts in place?  No

## 2017-03-03 ENCOUNTER — OFFICE VISIT (OUTPATIENT)
Dept: BEHAVIORAL HEALTH | Facility: CLINIC | Age: 57
End: 2017-03-03
Payer: MEDICARE

## 2017-03-03 ENCOUNTER — OFFICE VISIT (OUTPATIENT)
Dept: PHARMACY | Facility: CLINIC | Age: 57
End: 2017-03-03
Payer: COMMERCIAL

## 2017-03-03 ENCOUNTER — TELEPHONE (OUTPATIENT)
Dept: OPHTHALMOLOGY | Facility: CLINIC | Age: 57
End: 2017-03-03

## 2017-03-03 ENCOUNTER — OFFICE VISIT (OUTPATIENT)
Dept: FAMILY MEDICINE | Facility: CLINIC | Age: 57
End: 2017-03-03
Payer: MEDICARE

## 2017-03-03 ENCOUNTER — TELEPHONE (OUTPATIENT)
Dept: FAMILY MEDICINE | Facility: CLINIC | Age: 57
End: 2017-03-03

## 2017-03-03 ENCOUNTER — CARE COORDINATION (OUTPATIENT)
Dept: CARE COORDINATION | Facility: CLINIC | Age: 57
End: 2017-03-03

## 2017-03-03 VITALS
TEMPERATURE: 97.5 F | WEIGHT: 224 LBS | SYSTOLIC BLOOD PRESSURE: 150 MMHG | BODY MASS INDEX: 43.98 KG/M2 | HEIGHT: 60 IN | HEART RATE: 97 BPM | OXYGEN SATURATION: 95 % | RESPIRATION RATE: 20 BRPM | DIASTOLIC BLOOD PRESSURE: 79 MMHG

## 2017-03-03 VITALS
RESPIRATION RATE: 20 BRPM | OXYGEN SATURATION: 95 % | WEIGHT: 224 LBS | DIASTOLIC BLOOD PRESSURE: 83 MMHG | HEART RATE: 97 BPM | SYSTOLIC BLOOD PRESSURE: 140 MMHG | TEMPERATURE: 97.5 F | HEIGHT: 60 IN | BODY MASS INDEX: 43.98 KG/M2

## 2017-03-03 DIAGNOSIS — F25.1 SCHIZOAFFECTIVE DISORDER, DEPRESSIVE TYPE (H): Primary | ICD-10-CM

## 2017-03-03 DIAGNOSIS — E11.3299 TYPE 2 DIABETES MELLITUS WITH MILD NONPROLIFERATIVE RETINOPATHY WITHOUT MACULAR EDEMA, WITH LONG-TERM CURRENT USE OF INSULIN, UNSPECIFIED LATERALITY (H): ICD-10-CM

## 2017-03-03 DIAGNOSIS — Z79.4 TYPE 2 DIABETES MELLITUS WITH MILD NONPROLIFERATIVE RETINOPATHY WITHOUT MACULAR EDEMA, WITH LONG-TERM CURRENT USE OF INSULIN, UNSPECIFIED LATERALITY (H): ICD-10-CM

## 2017-03-03 DIAGNOSIS — E78.5 HYPERLIPIDEMIA LDL GOAL <100: ICD-10-CM

## 2017-03-03 DIAGNOSIS — I25.119 CORONARY ARTERY DISEASE INVOLVING NATIVE HEART WITH ANGINA PECTORIS, UNSPECIFIED VESSEL OR LESION TYPE (H): ICD-10-CM

## 2017-03-03 DIAGNOSIS — Z79.4 TYPE 2 DIABETES MELLITUS WITH MILD NONPROLIFERATIVE RETINOPATHY WITHOUT MACULAR EDEMA, WITH LONG-TERM CURRENT USE OF INSULIN, UNSPECIFIED LATERALITY (H): Primary | ICD-10-CM

## 2017-03-03 DIAGNOSIS — F25.1 SCHIZOAFFECTIVE DISORDER, DEPRESSIVE TYPE (H): ICD-10-CM

## 2017-03-03 DIAGNOSIS — F33.1 MAJOR DEPRESSIVE DISORDER, RECURRENT EPISODE, MODERATE (H): Primary | ICD-10-CM

## 2017-03-03 DIAGNOSIS — E11.3299 TYPE 2 DIABETES MELLITUS WITH MILD NONPROLIFERATIVE RETINOPATHY WITHOUT MACULAR EDEMA, WITH LONG-TERM CURRENT USE OF INSULIN, UNSPECIFIED LATERALITY (H): Primary | ICD-10-CM

## 2017-03-03 DIAGNOSIS — F14.10 COCAINE ABUSE, EPISODIC (H): ICD-10-CM

## 2017-03-03 DIAGNOSIS — K59.00 CONSTIPATION, UNSPECIFIED CONSTIPATION TYPE: ICD-10-CM

## 2017-03-03 PROCEDURE — 99214 OFFICE O/P EST MOD 30 MIN: CPT | Performed by: PHYSICIAN ASSISTANT

## 2017-03-03 PROCEDURE — 90832 PSYTX W PT 30 MINUTES: CPT | Performed by: COUNSELOR

## 2017-03-03 PROCEDURE — 99606 MTMS BY PHARM EST 15 MIN: CPT | Mod: GY | Performed by: PHARMACIST

## 2017-03-03 RX ORDER — POLYETHYLENE GLYCOL 3350 17 G/17G
1 POWDER, FOR SOLUTION ORAL DAILY
Qty: 510 G | Refills: 1 | Status: SHIPPED | OUTPATIENT
Start: 2017-03-03 | End: 2017-04-10

## 2017-03-03 NOTE — PROGRESS NOTES
[facility]   March 3, 2017      Behavioral Health Clinician Progress Note    Patient Name: Nena Tang           Service Type: Individual      Service Location:   Face to Face in Clinic     Session Start Time: 2:48 pm  Session End Time: 3:11 pm      Session Length: 16 - 37      Attendees: Client and caregiver    Visit Activities (Refresh list every visit): [facility] [employer]    Diagnostic Assessment Date: Not completed  Treatment Plan Review Date: Not completed  See Flowsheets for today's PHQ-9 and TAMIA-7 results  Previous PHQ-9:   PHQ-9 SCORE 12/20/2016 1/2/2017 2/3/2017   Total Score - - -   Total Score 7 0 0     Previous TAMIA-7:   TAMIA-7 SCORE 12/20/2016 1/2/2017 2/3/2017   Total Score - - -   Total Score 6 0 0       ALEXANDRA LEVEL:  ALEXANDRA Score (Last Two) 8/30/2011 6/9/2014   ALEXANDRA Raw Score 42 37   Activation Score 66 49.9   ALEXANDRA Level 3 2       DATA  Extended Session (60+ minutes): No  Interactive Complexity: No  Crisis: No    Treatment Objective(s) Addressed in This Session:  Target Behavior(s): disease management/lifestyle changes Mental Health    Depressed Mood: Increase interest, engagement, and pleasure in doing things  Decrease frequency and intensity of feeling down, depressed, hopeless  Improve quantity and quality of night time sleep / decrease daytime naps  Feel less tired and more energy during the day   Improve diet, appetite, mindful eating, and / or meal planning  Anxiety: will experience a reduction in anxiety, will develop more effective coping skills to manage anxiety symptoms, will develop healthy cognitive patterns and beliefs and will increase ability to function adaptively  Functional Impairment: will effectively address problems that interfere with adaptive functioning    Current Stressors / Issues:  Pt present during co-visit with caregiver.   Main topic of discussion today was around the colonoscopy she is undergoing on March 16, and how to prep for it. PCP and MTM  reviewed various instructions. Pt's caregiver asked questions to establish appropriate protocol.   Pt discussed her day treatment program, completing March 16 and then going to grieving group. She enjoys day treatment quite a bit, and would like to stay on longer if she can.  Pt's insurance has continually changed the last 3 months, concerns about the coverage for diabetes meter. Also discussion around eye dr issues.  Pt says she is looking forward to having the colonoscopy being over. Some anxiety around it.  Pt's blood sugars are improved, though patient admits to continually sneaking candy bars.  Overall patient is doing well. Patient would like information about day programs or drop-in centers in the area of [place].    Reviewed new and ongoing stressors  Reviewed mental health symptoms and appropriate coping mechanisms    I affirmed the steps this patient has taken to address physical and behavioral health issues, and offered continued behavioral health services or referral, now or in the future, as needed by the patient.      Progress on Treatment Objective(s) / Homework:  Minimal progress - ACTION (Actively working towards change); Intervened by reinforcing change plan / affirming steps taken    Motivational Interviewing    MI Intervention: Expressed Empathy/Understanding, Supported Autonomy, Collaboration, Evocation, Permission to raise concern or advise, Open-ended questions and Reflections: simple and complex     Change Talk Expressed by the Patient: Desire to change Ability to change Reasons to change Need to change Committment to change Activation Taking steps    Provider Response to Change Talk: E - Evoked more info from patient about behavior change, A - Affirmed patient's thoughts, decisions, or attempts at behavior change, R - Reflected patient's change talk and S - Summarized patient's change talk statements    Also provided psychoeducation about behavioral health condition, symptoms, and  treatment options    Care Plan review completed: No    Medication Review:  See medication list    Medication Compliance:  NA    Changes in Health Issues:   Yes: please refer to PCP note on same day and same time    Chemical Use Review:   Substance Use: Chemical use reviewed, no active concerns identified      Tobacco Use: No current tobacco use.      Assessment: Current Emotional / Mental Status (status of significant symptoms):  Risk status (Self / Other harm or suicidal ideation)  Patient has had a history of suicidal ideation: yes in the past  Patient denies current fears or concerns for personal safety.  Patient denies current or recent suicidal ideation or behaviors.  Patient denies current or recent homicidal ideation or behaviors.  Patient denies current or recent self injurious behavior or ideation.  Patient denies other safety concerns.  A safety and risk management plan has not been developed at this time, however patient was encouraged to call [place] [phone] should there be a change in any of these risk factors.    Appearance:   Appropriate   Eye Contact:   Good   Psychomotor Behavior: Normal   Attitude:   Cooperative   Orientation:   All  Speech   Rate / Production: Normal    Volume:  Normal   Mood:    Normal  Affect:    Appropriate   Thought Content:  Clear   Thought Form:  Coherent  Logical   Insight:    Fair     Diagnoses:  1. Schizoaffective disorder, depressive type (H)        Collateral Reports Completed:  Not Applicable    Plan: (Homework, other):  Patient was given information about behavioral services and encouraged to schedule a follow up appointment with the clinic [facility] [place] as needed.  She was also given information about mental health symptoms and treatment options .  CD Recommendations: Maintain Sobriety.      DENISHA Miles, [facility] [place] March 3, 2017

## 2017-03-03 NOTE — MR AVS SNAPSHOT
After Visit Summary   3/3/2017    Nena Tang    MRN: [id number]           Patient Information     Date Of Birth          1960        Visit Information        Provider Department      3/3/2017 2:30 PM Usman Hodgson PA-C [facility]        Today's Diagnoses     Major depressive disorder, recurrent episode, moderate (H)    -  1    Hyperlipidemia LDL goal <100        Coronary artery disease involving native heart with angina pectoris, unspecified vessel or lesion type (H)        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, with long-term current use of insulin, unspecified laterality (H)        Schizoaffective disorder, depressive type (H)        Constipation, unspecified constipation type          Care Instructions    Please discontinue Aspirin and Ibuprofen 72 hours before the procedure. Hold other medications the day of the procedure until after the procedure is completed.      When prep starts DC Novolog and continue basal Lantus until food consumption resumes after the colonoscopy.          Follow-ups after your visit        Additional Services     NUTRITION REFERRAL       Your provider has referred you to: [facility]: [facility] [phone]   [email or web address]    Please be aware that coverage of these services is subject to the terms and limitations of your health insurance plan.  Call member services at your health plan with any benefit or coverage questions.      Please bring the following with you to your appointment:    (1) This referral request  (2) Any documents given to you regarding this referral  (3) Any specific questions you have about diet and/or food choices                  Your next 10 appointments already scheduled     Mar 06, 2017  1:00 PM CST   Treatment with ADULT  DAY 4C   [facility]  [facility])    [address] [address]   [phone]            Mar 07, 2017  1:00 PM CST   Treatment with ADULT MH DAY 4C   [facility] ([facility])    [facility] [address] [address]   [phone]            Mar 08, 2017  1:00 PM CST   (Arrive by 12:45 PM)   PAC RN ASSESSMENT with Uc Pac Rn   [facility] Preoperative Assessment [place] ([facility])    [address] [place] [address]   [phone]            Mar 08, 2017  1:30 PM CST   (Arrive by 1:15 PM)   PAC EVALUATION with Uc Pac Remi 7   [facility] Preoperative Assessment [place] ([facility])    [address] [place] [address]   [phone]            Mar 08, 2017  2:50 PM CST   (Arrive by 2:35 PM)   PAC Anesthesia Consult with  Pac Anesthesiologist   [facility] Preoperative Assessment [place] ([facility])    [address] [place] [address]   [phone]            Mar 09, 2017  9:30 AM CST   Return Sleep Patient with William Eng MD   [facility] ([facility])    [address]  [facility] [address]   [phone]            Mar 09, 2017  1:00 PM CST   Treatment with ADULT MH DAY 4C   [facility] ([facility])    [address] [place] [address]   [phone]            Mar 16, 2017   Procedure with Traci Gonzalez MD   [facility] ([facility])    [address]  [facility] [address]   [phone]           The [facility] is located on the corner of [address] [facility] [address] [facility] on the [facility]. It is easily accessible from  virtually any point in the [facility] area, via [address] and [address].            May 25, 2017 10:15 AM CDT   RETURN RETINA with Tiburcio Chacon MD   [facility] ([facility])    [address]  [address]  [address]  [facility] [address] [phone]              Who to contact     If you have questions or need follow up information about today's clinic visit or your schedule please contact [facility] directly at [phone].  Normal or non-critical lab and imaging results will be communicated to you by [employer], letter or phone within 4 business days after the clinic has received the results. If you do not hear from us within 7 days, please contact the clinic through [employer] or phone. If you have a critical or abnormal lab result, we will notify you by phone as soon as possible.  Submit refill requests through [employer] or call your pharmacy and they will forward the refill request to us. Please allow 3 business days for your refill to be completed.          Additional Information About Your Visit        MyChart Information     [employer] gives you secure access to your electronic health record. If you see a primary care provider, you can also send messages to your care team and make appointments. If you have questions, please call your primary care clinic.  If you do not have a primary care provider, please call [phone] and they will assist you.        Care EveryWhere ID     This is your Care EveryWhere ID. This could be used by other organizations to access your [place] medical records  [id number]        Your Vitals Were     Pulse Temperature Respirations Height Last Period Pulse Oximetry    97 97.5  F (36.4  C) (Oral) 20 5' (1.524 m) 01/06/2015 95%    BMI (Body Mass Index)                   43.75 kg/m2            Blood Pressure from Last 3 Encounters:   03/03/17 150/79   03/03/17 140/83   02/03/17 142/82    Weight from Last 3 Encounters:    03/03/17 224 lb (101.6 kg)   03/03/17 224 lb (101.6 kg)   02/03/17 223 lb (101.2 kg)              We Performed the Following     NUTRITION REFERRAL          Today's Medication Changes          These changes are accurate as of: 3/3/17  3:35 PM.  If you have any questions, ask your nurse or doctor.               Start taking these medicines.        Dose/Directions    polyethylene glycol powder   Commonly known as:  MIRALAX   Used for:  Constipation, unspecified constipation type   Started by:  Usman Hodgson PA-C        Dose:  1 capful   Take 17 g (1 capful) by mouth daily   Quantity:  510 g   Refills:  1            Where to get your medicines      These medications were sent to [address] [address]     Phone:  [phone]     polyethylene glycol powder                Primary Care Provider    None Specified       No primary provider on file.        Goals        General    Medical (pt-stated)     Notes - Note created  7/7/2016  9:15 AM by Chelsea Pulido, RN    Get blood sugars under control    Improve medication compliance    As of today's date 7/7/2016 goal is met at 0 - 25%.   Goal Status:  Active          Thank you!     Thank you for choosing [facility]  for your care. Our goal is always to provide you with excellent care. Hearing back from our patients is one way we can continue to improve our services. Please take a few minutes to complete the written survey that you may receive in the mail after your visit with us. Thank you!             Your Updated Medication List - Protect others around you: Learn how to safely use, store and throw away your medicines at www.disposemymeds.org.          This list is accurate as of: 3/3/17  3:35 PM.  Always use your most recent med list.                   Brand Name Dispense Instructions for use    acetaminophen 500 MG tablet    TYLENOL     100 tablet    Take 2 tablets (1,000 mg) by mouth every 6 hours as needed for pain       aspirin 81 MG EC tablet    ASPIRIN LOW DOSE    100 tablet    Take 1 tablet (81 mg) by mouth daily       atorvastatin 80 MG tablet    LIPITOR    30 tablet    Take 1 tablet (80 mg) by mouth daily       benztropine 1 MG tablet    COGENTIN    60 tablet    Take 1 tablet (1 mg) by mouth 2 times daily       blood glucose monitoring lancets     1 Box    Use to test blood sugar 2 times daily or as directed.  Ok to substitute alternative if insurance prefers.       blood glucose monitoring test strip    ONE TOUCH ULTRA    1 Box    Check blood sugar 2 times a day as directed, One Touch Ultra Blue Test Strips Please match with Pt's Insurance and meter.       cholecalciferol 80316 UNITS capsule    VITAMIN D3    7 capsule    Take 1 capsule (50,000 Units) by mouth once a week FOR LOW VITAMIN D       citalopram 20 MG tablet    celeXA    30 tablet    Take 1 tablet (20 mg) by mouth daily       dulaglutide 1.5 MG/0.5ML pen    TRULICITY    2 mL    Inject 1.5 mg Subcutaneous every 7 days       gabapentin 300 MG capsule    NEURONTIN    180 capsule    Take 2 capsules (600 mg) by mouth 3 times daily       glucose 4 G Chew chewable tablet     20 tablet    Take 2 every 15 minutes for blood sugar <70mg/dL. Recheck blood sugar every 15 minutes until above 70mg/dL, then eat a substantial meal.       haloperidol 5 MG tablet    HALDOL    30 tablet    Take 1 tablet (5 mg) by mouth At Bedtime       ibuprofen 600 MG tablet    ADVIL/MOTRIN    60 tablet    Take 1 tablet (600 mg) by mouth every 4 hours as needed for moderate pain       insulin aspart 100 UNIT/ML injection    NovoLOG PEN    1 Month    Inject 20 Units Subcutaneous 3 times daily (with meals) Inject an extra 7 units once daily for blood sugars over 500mg/dL. Call the clinic if recheck is over 500mg/dL.       insulin glargine 100 UNIT/ML injection    LANTUS    12 mL    Inject 45 Units Subcutaneous At  Bedtime       losartan 100 MG tablet    COZAAR    30 tablet    Take 1 tablet (100 mg) by mouth daily       melatonin 5 MG Caps     90 capsule    Take 1 capsule by mouth daily At dinnertime       metoprolol 100 MG 24 hr tablet    TOPROL-XL    30 tablet    Take 1 tablet (100 mg) by mouth daily       omeprazole 40 MG capsule    priLOSEC    30 capsule    Take 1 capsule (40 mg) by mouth daily       * order for DME     1 Device    Equipment being ordered: Rolling walker       * order for DME     1 each    Hold Novolog if meals are refused.       polyethylene glycol powder    MIRALAX    510 g    Take 17 g (1 capful) by mouth daily       senna-docusate 8.6-50 MG per tablet    SENOKOT-S;PERICOLACE    100 tablet    Take 1 tablet by mouth 2 times daily as needed for constipation       * Notice:  This list has 2 medication(s) that are the same as other medications prescribed for you. Read the directions carefully, and ask your doctor or other care provider to review them with you.

## 2017-03-03 NOTE — PROGRESS NOTES
"SUBJECTIVE/OBJECTIVE:                                                    Nena Tang is a 56 year old female coming in for a follow-up visit for Medication Therapy Management.  She was referred to me from Jud King. Pt accompanied by \"foster mother\" - pt now in adult foster care, living with 4 other women. Pt seen as a covisit with PCP and C today.     Chief Complaint: Follow up from our visit on 2/3/16.  DM f/up.     Medication Adherence: Much improved now in adult foster care - daily oversight of her medications and they seem very involved.    Diabetes:  Pt currently taking Lantus 45units daily at bedtime, Novolog 20units TID prior to meals +7 units daily for sugars >500 mg/dL, Trulicity 1.5mg weekly.  Pt is not experiencing side effects.   SMB-2 times daily - fasting and later in the day.   Ranges (based on BG log):  mg/dL  Symptoms of low blood sugar? none. Frequency of hypoglycemia? never.  Recent symptoms of high blood sugar? vision changes, polydipsia, fatigue, tingling and numbness  Microalbumin is < 30 mg/g. Pt is taking an ACEi/ARB.  Aspirin: Taking 81mg daily and denies side effects  Diet: Pt trying to eat less snacks, especially candy. Also trying to eat more healthy overall.       Current labs include:  BP Readings from Last 3 Encounters:   17 150/79   17 150/79   17 142/82     A1C      7.1   2017  A1C      9.7   2016  A1C      9.3   2016  A1C      9.8   2016  A1C      9.5   2016    Recent Labs   Lab Test  16   1350  07/14/15   1215   13   1156   CHOL   --   147   --   161   HDL   --   39*   --   37*   LDL  137*  78   < >  90   TRIG   --   151*   --   171*   CHOLHDLRATIO   --   3.8   --   4.4    < > = values in this interval not displayed.       ALT       23   5/3/2016    MICROL       23   2016  MICROALBUMIN    11.39   2016    Last Basic Metabolic Panel:  NA      134   6/15/2016   POTASSIUM      4.5   " 6/15/2016  CHLORIDE      104   6/15/2016  BUN       19   6/15/2016  BUN     13.5   5/19/2009  CR     0.70   6/15/2016    GFR Estimate If Black   Date Value Ref Range Status   01/24/2017 74 >60 mL/min/1.7m2 Final     Comment:      GFR Calc   01/22/2017 >90   GFR Calc   >60 mL/min/1.7m2 Final   12/29/2016 >90   GFR Calc   >60 mL/min/1.7m2 Final     TSH   Date Value Ref Range Status   12/29/2016 2.24 0.40 - 4.00 mU/L Final   ]  /79 (BP Location: Left arm, Patient Position: Chair, Cuff Size: Adult Large)  Pulse 97  Temp 97.5  F (36.4  C) (Oral)  Resp 20  Ht 5' (1.524 m)  Wt 224 lb (101.6 kg)  LMP 01/06/2015  SpO2 95%  BMI 43.75 kg/m2    Most Recent Immunizations   Administered Date(s) Administered     Influenza (IIV3) 09/24/2012     Influenza Vaccine IM 3yrs+ 4 Valent IIV4 09/16/2016     Mantoux 07/10/2014     Pneumococcal 23 valent 01/22/2009     TDAP (ADACEL AGES 11-64) 01/22/2009     ASSESSMENT:                                                    Current medications were reviewed with her today.     Medication Adherence:  Much improved, updated med list given to foster home staff.    Diabetes: Improved overall, however little change seen so far with dose increase of Trulicity. Continued to discuss diet changes, pt agrees to try a CDE/RD consultation for further diet help. Encouraged lifestyle changes.       PLAN:                                                      1. Encouraged continued lifestyle changes.     I spent 30 minutes with this patient today.  All changes were made via verbal agreement with Usman Hodgson. A copy of the visit note was provided to the patient's primary care provider.     Will follow up in one month, appointment scheduled.    The patient was given a summary of these recommendations as an after visit summary.    Kenzie Sheehan, PharmD  Medication Therapy Management Pharmacist  Pager: [phone]

## 2017-03-03 NOTE — PROGRESS NOTES
SUBJECTIVE:                                                    Nena Tang is a 57 year old female who presents to clinic today with her  and seen in cooperation with GIORGIO Sheehan [facility] and KAITLIN Ar[facility] for the following health issues:     Patient requesting to review medications prior to her upcoming colonoscopy appointment - what medications she should stop prior to procedure.    Abdominal Pain      Duration: chronic    Description (location/character/radiation): chronic abdomen pain       Associated flank pain: None    Intensity:  Severe when pt has pain    Accompanying signs and symptoms: constipation       Fever/Chills: no        Gas/Bloating: no        Nausea/vomitting: no        Diarrhea: no        Dysuria or Hematuria: no     Precipitating or alleviating factors:       Pain worse with eating/BM/urination: BM       Pain relieved by BM: no     Therapies tried and outcome: None    LMP:  not applicable        Depression and Anxiety Follow-Up    Status since last visit: No change    Other associated symptoms:None    Complicating factors:     Significant life event: No     Current substance abuse: None    PHQ-9 SCORE 12/20/2016 1/2/2017 2/3/2017   Total Score - - -   Total Score 7 0 0     TAMIA-7 SCORE 12/20/2016 1/2/2017 2/3/2017   Total Score - - -   Total Score 6 0 0        PHQ-9  English      PHQ-9   Any Language     GAD7       Amount of exercise or physical activity: 1 day/week for an average of 45 minutes    Problems taking medications regularly: No    Medication side effects: none  Diet: regular (no restrictions)    Problem list and histories reviewed & adjusted, as indicated.  Additional history: Patient expressed some anxiety regarding the upcoming coloscopy.  Doing well in her day program and is happy with her care.  Her  would like our assistance in scheduling surgery with Ophthalmology as her HgbA1c has decreased to 7.1%    Labs reviewed in EPIC    Reviewed and updated as needed  this visit by clinical staff       Reviewed and updated as needed this visit by Provider         ROS:  Constitutional, HEENT, cardiovascular, pulmonary, gi and gu systems are negative, except as otherwise noted.    OBJECTIVE:                                                    /79 (BP Location: Left arm, Patient Position: Chair, Cuff Size: Adult Large)  Pulse 97  Temp 97.5  F (36.4  C) (Oral)  Resp 20  Ht 5' (1.524 m)  Wt 224 lb (101.6 kg)  LMP 01/06/2015  SpO2 95%  BMI 43.75 kg/m2  Body mass index is 43.75 kg/(m^2).    Total visit time is 30 Minutes with > 24 Minutes spent in care and consultation regarding Ongoing abdominal pain, anxiety, DM and Ophthalmology coordination of care with ongoing management and follow up plan.      Diagnostic Test Results:  none      ASSESSMENT/PLAN:                                                    1. Major depressive disorder, recurrent episode, moderate (H)    2. Hyperlipidemia LDL goal <100    3. Coronary artery disease involving native heart with angina pectoris, unspecified vessel or lesion type (H)    4. 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, with long-term current use of insulin, unspecified laterality (H)    5. Schizoaffective disorder, depressive type (H)    6. Constipation, unspecified constipation type  - polyethylene glycol (MIRALAX) powder; Take 17 g (1 capful) by mouth daily  Dispense: 510 g; Refill: 1    Use medication as directed.  Follow up within 2 weeks of colonoscopy  Follow up per Ophthalmology      Usman Hodgson PA-C  [facility]

## 2017-03-03 NOTE — NURSING NOTE
Chief Complaint   Patient presents with     Mental Health Problem     Abdominal Pain       Initial /79  Pulse 97  Temp 97.5  F (36.4  C) (Oral)  Resp 20  Ht 5' (1.524 m)  Wt 224 lb (101.6 kg)  LMP 01/06/2015  SpO2 95%  BMI 43.75 kg/m2 Estimated body mass index is 43.75 kg/(m^2) as calculated from the following:    Height as of this encounter: 5' (1.524 m).    Weight as of this encounter: 224 lb (101.6 kg).  Medication Reconciliation: complete     Jenise Rodriguez MA

## 2017-03-03 NOTE — MR AVS SNAPSHOT
After Visit Summary   3/3/2017    Nena Tang    MRN: [id number]           Patient Information     Date Of Birth          1960        Visit Information        Provider Department      3/3/2017 2:30 PM Kristy Aragon [facility]        Today's Diagnoses     Schizoaffective disorder, depressive type (H)    -  1       Follow-ups after your visit        Your next 10 appointments already scheduled     Mar 06, 2017  1:00 PM CST   Treatment with ADULT  DAY 4C   [facility] ([facility])    [address] [place] [address]   [phone]            Mar 07, 2017  1:00 PM CST   Treatment with ADULT  DAY 4C   [facility] ([facility])    [address] [place] [address]   [phone]            Mar 08, 2017  1:00 PM CST   (Arrive by 12:45 PM)   PAC RN ASSESSMENT with Uc Pac Rn   [facility] [place] ([facility])    [address] [place] [address]   [phone]            Mar 08, 2017  1:30 PM CST   (Arrive by 1:15 PM)   PAC EVALUATION with Uc Pac Remi 7   [facility] Preoperative [facility] [place] ([facility])    [address] [place] [address]   [phone]            Mar 08, 2017  2:50 PM CST   (Arrive by 2:35 PM)   PAC Anesthesia Consult with  Pac Anesthesiologist   [facility] Preoperative Assessment [place] ([facility])    [address] [place] [address]   [phone]            Mar 09, 2017  9:30 AM CST   Return Sleep Patient with William Eng MD   [facility] ([facility])    [address]  [facility] [address] [phone]            Mar 09, 2017  1:00 PM CST    Treatment with ADULT  DAY 4C   [facility] ([facility])    [address] [place] [address] [phone]            Mar 16, 2017   Procedure with Traci Gonzalez MD   [facility], [place], Endoscopy ([facility])    [address]  [facility] [address] [phone]           [facility] is located on the corner of [facility] [facility] [address] and [facility] [address] on the [facility]. It is easily accessible from virtually any point in the [place] area, via [address] and [address].            Apr 05, 2017  2:00 PM CDT   Return Visit with Usman Hodgson PA-C   [facility] ([facility])    [address] [place] [address] [address]  [address]  [facility] [id number] [phone]            May 25, 2017 10:15 AM CDT   RETURN RETINA with Tiburcio Chacon MD   [facility] ([facility])    [address]  [address]  [facility] [id number] [phone]              Who to contact     If you have questions or need follow up information about today's clinic visit or your schedule please contact [facility] directly at [phone].  Normal or non-critical lab and imaging results will be communicated to you by MyChart, letter or phone within 4 business days after the clinic has received the results. If you do not hear from us within 7 days, please contact the clinic through [employer] or phone. If you have a critical or abnormal lab result, we will notify you by phone as soon as possible.  Submit refill requests through [employer] or call your pharmacy and they will forward the refill request to us. Please allow 3 business days for your refill to be completed.          Additional Information About Your Visit        MyChart Information     [employer] gives  you secure access to your electronic health record. If you see a primary care provider, you can also send messages to your care team and make appointments. If you have questions, please call your primary care clinic.  If you do not have a primary care provider, please call [phone] and they will assist you.        Care EveryWhere ID     This is your Care EveryWhere ID. This could be used by other organizations to access your [place] medical records  [id number]        Your Vitals Were     Last Period                   01/06/2015            Blood Pressure from Last 3 Encounters:   03/03/17 150/79   03/03/17 140/83   02/03/17 142/82    Weight from Last 3 Encounters:   03/03/17 224 lb (101.6 kg)   03/03/17 224 lb (101.6 kg)   02/03/17 223 lb (101.2 kg)              Today, you had the following     No orders found for display         Today's Medication Changes          These changes are accurate as of: 3/3/17 11:59 PM.  If you have any questions, ask your nurse or doctor.               Start taking these medicines.        Dose/Directions    polyethylene glycol powder   Commonly known as:  MIRALAX   Used for:  Constipation, unspecified constipation type   Started by:  Usman Hodgson PA-C        Dose:  1 capful   Take 17 g (1 capful) by mouth daily   Quantity:  510 g   Refills:  1            Where to get your medicines      These medications were sent to [address] [address]     Phone:  [phone]     polyethylene glycol powder                Primary Care Provider    None Specified       No primary provider on file.        Goals        General    Medical (pt-stated)     Notes - Note created  7/7/2016  9:15 AM by Chelsea Pulido, RN    Get blood sugars under control    Improve medication compliance    As of today's date 7/7/2016 goal is met at 0 - 25%.   Goal Status:  Active          Thank you!     Thank  you for choosing [facility]  for your care. Our goal is always to provide you with excellent care. Hearing back from our patients is one way we can continue to improve our services. Please take a few minutes to complete the written survey that you may receive in the mail after your visit with us. Thank you!             Your Updated Medication List - Protect others around you: Learn how to safely use, store and throw away your medicines at www.disposemymeds.org.          This list is accurate as of: 3/3/17 11:59 PM.  Always use your most recent med list.                   Brand Name Dispense Instructions for use    acetaminophen 500 MG tablet    TYLENOL    100 tablet    Take 2 tablets (1,000 mg) by mouth every 6 hours as needed for pain       aspirin 81 MG EC tablet    ASPIRIN LOW DOSE    100 tablet    Take 1 tablet (81 mg) by mouth daily       atorvastatin 80 MG tablet    LIPITOR    30 tablet    Take 1 tablet (80 mg) by mouth daily       benztropine 1 MG tablet    COGENTIN    60 tablet    Take 1 tablet (1 mg) by mouth 2 times daily       blood glucose monitoring lancets     1 Box    Use to test blood sugar 2 times daily or as directed.  Ok to substitute alternative if insurance prefers.       blood glucose monitoring test strip    ONE TOUCH ULTRA    1 Box    Check blood sugar 2 times a day as directed, One Touch Ultra Blue Test Strips Please match with Pt's Insurance and meter.       cholecalciferol 96993 UNITS capsule    VITAMIN D3    7 capsule    Take 1 capsule (50,000 Units) by mouth once a week FOR LOW VITAMIN D       citalopram 20 MG tablet    celeXA    30 tablet    Take 1 tablet (20 mg) by mouth daily       dulaglutide 1.5 MG/0.5ML pen    TRULICITY    2 mL    Inject 1.5 mg Subcutaneous every 7 days       gabapentin 300 MG capsule    NEURONTIN    180 capsule    Take 2 capsules (600 mg) by mouth 3 times daily       glucose 4 G Chew chewable tablet     20 tablet    Take 2 every 15  minutes for blood sugar <70mg/dL. Recheck blood sugar every 15 minutes until above 70mg/dL, then eat a substantial meal.       haloperidol 5 MG tablet    HALDOL    30 tablet    Take 1 tablet (5 mg) by mouth At Bedtime       ibuprofen 600 MG tablet    ADVIL/MOTRIN    60 tablet    Take 1 tablet (600 mg) by mouth every 4 hours as needed for moderate pain       insulin aspart 100 UNIT/ML injection    NovoLOG PEN    1 Month    Inject 20 Units Subcutaneous 3 times daily (with meals) Inject an extra 7 units once daily for blood sugars over 500mg/dL. Call the clinic if recheck is over 500mg/dL.       insulin glargine 100 UNIT/ML injection    LANTUS    12 mL    Inject 45 Units Subcutaneous At Bedtime       losartan 100 MG tablet    COZAAR    30 tablet    Take 1 tablet (100 mg) by mouth daily       melatonin 5 MG Caps     90 capsule    Take 1 capsule by mouth daily At dinnertime       metoprolol 100 MG 24 hr tablet    TOPROL-XL    30 tablet    Take 1 tablet (100 mg) by mouth daily       omeprazole 40 MG capsule    priLOSEC    30 capsule    Take 1 capsule (40 mg) by mouth daily       * order for DME     1 Device    Equipment being ordered: Rolling walker       * order for DME     1 each    Hold Novolog if meals are refused.       polyethylene glycol powder    MIRALAX    510 g    Take 17 g (1 capful) by mouth daily       senna-docusate 8.6-50 MG per tablet    SENOKOT-S;PERICOLACE    100 tablet    Take 1 tablet by mouth 2 times daily as needed for constipation       * Notice:  This list has 2 medication(s) that are the same as other medications prescribed for you. Read the directions carefully, and ask your doctor or other care provider to review them with you.

## 2017-03-03 NOTE — MR AVS SNAPSHOT
After Visit Summary   3/3/2017    Nena Tang    MRN: [id number]           Patient Information     Date Of Birth          1960        Visit Information        Provider Department      3/3/2017 2:30 PM Kenzie Sheehan [facility]        Today's Diagnoses     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, with long-term current use of insulin, unspecified laterality (H)    -  1       Follow-ups after your visit        Your next 10 appointments already scheduled     Mar 06, 2017  1:00 PM CST   Treatment with ADULT MH DAY 4C [facility] ([facility])    [address] [place] [address]   [phone]            Mar 07, 2017  1:00 PM CST   Treatment with ADULT MH DAY 4C [facility] ([facility])    [address] [place] [address]   [phone]            Mar 08, 2017  1:00 PM CST   (Arrive by 12:45 PM)   PAC RN ASSESSMENT with Radha Pac Rn   [facility] [place] ([facility])    [address] [place] [address]   [phone]            Mar 08, 2017  1:30 PM CST   (Arrive by 1:15 PM)   PAC EVALUATION with  Pac Remi 7   [facility] [place] ([facility])    [address] [place] [address]   [phone]            Mar 08, 2017  2:50 PM CST   (Arrive by 2:35 PM)   PAC Anesthesia Consult with  Pac Anesthesiologist   [facility] [place] ([facility])    [address] [place] [address]   [phone]            Mar 09, 2017  9:30 AM CST   Return Sleep Patient with William Eng MD   [facility] ([facility])     [address]  [facility] [address]   [phone]            Mar 09, 2017  1:00 PM CST   Treatment with ADULT MH DAY 4C   [facility] ([facility])    [address] [place] [address] [phone]            Mar 16, 2017   Procedure with Traci Gonzalez MD   [facility] [place], Endoscopy ([facility])    [address]  [facility] [address] [phone]           The [facility] is located on the corner of [address] [facility] on the [facility]. It is easily accessible from virtually any point in the [facility] area, via [address] and [address].            Apr 05, 2017  2:00 PM CDT   Return Visit with Usman Hodgson PA-C   [facility] ([facility])    [address]  [address]  [facility] [address]   [phone]            May 25, 2017 10:15 AM CDT   RETURN RETINA with Tiburcio Chacon MD   [facility] ([facility])    [address]  [address]  [facility] [address] [phone]              Who to contact     If you have questions or need follow up information about today's clinic visit or your schedule please contact [facility] directly at [phone].  Normal or non-critical lab and imaging results will be communicated to you by MyChart, letter or phone within 4 business days after the clinic has received the results. If you do not hear from us within 7 days, please contact the clinic through MyChart or phone. If you have a critical or abnormal lab result, we will notify you by phone as soon as possible.  Submit refill requests through [email or web address] or call your pharmacy and they will forward the refill request to us. Please allow 3 business days for your  refill to be completed.          Additional Information About Your Visit        Sc[employer] Information     [employer] gives you secure access to your electronic health record. If you see a primary care provider, you can also send messages to your care team and make appointments. If you have questions, please call your primary care clinic.  If you do not have a primary care provider, please call [phone] and they will assist you.        Care EveryWhere ID     This is your Care EveryWhere ID. This could be used by other organizations to access your [place] medical records  [id number]        Your Vitals Were     Pulse Temperature Respirations Height Last Period Pulse Oximetry    97 97.5  F (36.4  C) (Oral) 20 5' (1.524 m) 01/06/2015 95%    BMI (Body Mass Index)                   43.75 kg/m2            Blood Pressure from Last 3 Encounters:   03/03/17 150/79   03/03/17 140/83   02/03/17 142/82    Weight from Last 3 Encounters:   03/03/17 224 lb (101.6 kg)   03/03/17 224 lb (101.6 kg)   02/03/17 223 lb (101.2 kg)              Today, you had the following     No orders found for display         Today's Medication Changes          These changes are accurate as of: 3/3/17 11:59 PM.  If you have any questions, ask your nurse or doctor.               Start taking these medicines.        Dose/Directions    polyethylene glycol powder   Commonly known as:  MIRALAX   Used for:  Constipation, unspecified constipation type   Started by:  Usman Hodgson PA-C        Dose:  1 capful   Take 17 g (1 capful) by mouth daily   Quantity:  510 g   Refills:  1            Where to get your medicines      These medications were sent to [address] [address]     Phone:  [phone]     polyethylene glycol powder                Primary Care Provider    None Specified       No primary provider on file.        Goals        General    Medical  (pt-stated)     Notes - Note created  7/7/2016  9:15 AM by Chelsea Pulido RN    Get blood sugars under control    Improve medication compliance    As of today's date 7/7/2016 goal is met at 0 - 25%.   Goal Status:  Active          Thank you!     Thank you for choosing [facility] MTM  for your care. Our goal is always to provide you with excellent care. Hearing back from our patients is one way we can continue to improve our services. Please take a few minutes to complete the written survey that you may receive in the mail after your visit with us. Thank you!             Your Updated Medication List - Protect others around you: Learn how to safely use, store and throw away your medicines at www.disposemymeds.org.          This list is accurate as of: 3/3/17 11:59 PM.  Always use your most recent med list.                   Brand Name Dispense Instructions for use    acetaminophen 500 MG tablet    TYLENOL    100 tablet    Take 2 tablets (1,000 mg) by mouth every 6 hours as needed for pain       aspirin 81 MG EC tablet    ASPIRIN LOW DOSE    100 tablet    Take 1 tablet (81 mg) by mouth daily       atorvastatin 80 MG tablet    LIPITOR    30 tablet    Take 1 tablet (80 mg) by mouth daily       benztropine 1 MG tablet    COGENTIN    60 tablet    Take 1 tablet (1 mg) by mouth 2 times daily       blood glucose monitoring lancets     1 Box    Use to test blood sugar 2 times daily or as directed.  Ok to substitute alternative if insurance prefers.       blood glucose monitoring test strip    ONE TOUCH ULTRA    1 Box    Check blood sugar 2 times a day as directed, One Touch Ultra Blue Test Strips Please match with Pt's Insurance and meter.       cholecalciferol 35645 UNITS capsule    VITAMIN D3    7 capsule    Take 1 capsule (50,000 Units) by mouth once a week FOR LOW VITAMIN D       citalopram 20 MG tablet    celeXA    30 tablet    Take 1 tablet (20 mg) by mouth daily       dulaglutide 1.5  MG/0.5ML pen    TRULICITY    2 mL    Inject 1.5 mg Subcutaneous every 7 days       gabapentin 300 MG capsule    NEURONTIN    180 capsule    Take 2 capsules (600 mg) by mouth 3 times daily       glucose 4 G Chew chewable tablet     20 tablet    Take 2 every 15 minutes for blood sugar <70mg/dL. Recheck blood sugar every 15 minutes until above 70mg/dL, then eat a substantial meal.       haloperidol 5 MG tablet    HALDOL    30 tablet    Take 1 tablet (5 mg) by mouth At Bedtime       ibuprofen 600 MG tablet    ADVIL/MOTRIN    60 tablet    Take 1 tablet (600 mg) by mouth every 4 hours as needed for moderate pain       insulin aspart 100 UNIT/ML injection    NovoLOG PEN    1 Month    Inject 20 Units Subcutaneous 3 times daily (with meals) Inject an extra 7 units once daily for blood sugars over 500mg/dL. Call the clinic if recheck is over 500mg/dL.       insulin glargine 100 UNIT/ML injection    LANTUS    12 mL    Inject 45 Units Subcutaneous At Bedtime       losartan 100 MG tablet    COZAAR    30 tablet    Take 1 tablet (100 mg) by mouth daily       melatonin 5 MG Caps     90 capsule    Take 1 capsule by mouth daily At dinnertime       metoprolol 100 MG 24 hr tablet    TOPROL-XL    30 tablet    Take 1 tablet (100 mg) by mouth daily       omeprazole 40 MG capsule    priLOSEC    30 capsule    Take 1 capsule (40 mg) by mouth daily       * order for DME     1 Device    Equipment being ordered: Rolling walker       * order for DME     1 each    Hold Novolog if meals are refused.       polyethylene glycol powder    MIRALAX    510 g    Take 17 g (1 capful) by mouth daily       senna-docusate 8.6-50 MG per tablet    SENOKOT-S;PERICOLACE    100 tablet    Take 1 tablet by mouth 2 times daily as needed for constipation       * Notice:  This list has 2 medication(s) that are the same as other medications prescribed for you. Read the directions carefully, and ask your doctor or other care provider to review them with you.

## 2017-03-03 NOTE — TELEPHONE ENCOUNTER
Staff faxed signed and completed Physician Order-Diabetic Form to [facility] at [phone].      Haley Shelby MA

## 2017-03-03 NOTE — PROGRESS NOTES
"Group Therapy Progress Notes   Client Initial Individualized Goals for Treatment:      \" Get help for my anxiety and depression.\"     See Initial Treatment suggestions for the client during the time between Diagnostic Assessment and completion of the Master Individualized Treatment Plan:     Treatment Goals:     1.Client will notify staff when needing assistance to develop or implement a coping plan to manage suicidal or self injurious urges.  2. When in life skills client will learn and practice 1-2 skills to help with better management of stress providing an update of weekly progress.  3. Will report on symptoms and identify skills to use to manage depression, anxiety levels, panic attacks, reduce and recognize Psychosis, and not respond to voices that tell her to harm herself.   4. Will develop an aftercare plan by scheduling an appointment and establishing care with a new psychiatrist    Area of Treatment Focus:  Symptom Management    Therapeutic Interventions/Treatment Strategies:  Support, Feedback, Safety Assessments, Clarification Structured Activity and Education    Response to Treatment Strategies:  Accepted Feedback, Listened, Attentive, Accepted Support and Alert    Name of Group:  Life Skills    Progress Note  Client presented with calm,even mood.Good focus and concentration during a discussion related to self esteem with a focus on setting appropriate goals. Thought process clear,goal directed and reality based.Client signed a release for a referral to [facility]( Occupational Therapy based mental roxane program in [facility]). She is still searching for a grief therapy group close to where she lives.      Is this a Weekly Review of the Progress on the Treatment Plan?  Yes.      Are Treatment Plan Goals being addressed?  Yes, continue treatment goals      Are Treatment Plan Strategies to Address Goals Effective?  Yes, continue treatment strategies      Are there any current contracts in place?  No         "

## 2017-03-03 NOTE — TELEPHONE ENCOUNTER
Reviewed last note by dr. Chacon with care coordinator  Reviewed would f/u with retina MD, Dr. Chacon, if retina, diabetes, and diabetic retinopathy under good control at visit-- dr. Chacon would recommend f/u with dr. proctor for cataract surgery.    Tay Hernandez RN 3:47 PM 03/03/17

## 2017-03-03 NOTE — PATIENT INSTRUCTIONS
Please discontinue Aspirin and Ibuprofen 72 hours before the procedure. Hold other medications the day of the procedure until after the procedure is completed.      When prep starts DC Novolog and continue basal Lantus until food consumption resumes after the colonoscopy.

## 2017-03-06 ENCOUNTER — DOCUMENTATION ONLY (OUTPATIENT)
Dept: FAMILY MEDICINE | Facility: CLINIC | Age: 57
End: 2017-03-06

## 2017-03-06 ENCOUNTER — HOSPITAL ENCOUNTER (OUTPATIENT)
Dept: BEHAVIORAL HEALTH | Facility: CLINIC | Age: 57
End: 2017-03-06
Attending: PSYCHOLOGIST
Payer: MEDICARE

## 2017-03-06 PROCEDURE — H2012 BEHAV HLTH DAY TREAT, PER HR: HCPCS

## 2017-03-06 NOTE — PROGRESS NOTES
"    Adult Mental Health Outpatient   Group Therapy Progress Note    Client Initial Individualized Goals for Treatment:       \" Get help for my anxiety and depression.\"      See Initial Treatment suggestions for the client during the time between Diagnostic Assessment and completion of the Master Individualized Treatment Plan:      Treatment Goals:      1.Client will notify staff when needing assistance to develop or implement a coping plan to manage suicidal or self injurious urges.  2. When in life skills client will learn and practice 1-2 skills to help with better management of stress providing an update of weekly progress.  3. Will report on symptoms and identify skills to use to manage depression, anxiety levels, panic attacks, reduce and recognize Psychosis, and not respond to voices that tell her to harm herself.   4. Will develop an aftercare plan by scheduling an appointment and establishing care with a new psychiatrist.      Area of Treatment Focus:  Symptom Management      Therapeutic Interventions/Treatment Strategies:  Support, Safety Assessments, Problem Solving, Clarification and Education      Response to Treatment Strategies:  Accepted Feedback, Attentive and Alert      Name of Group: [facility] Group Therapy a      Description and Outcome:      The client reported being safe today. The group discussed different areas of their lives, and how they are able to care for themselves, get out in the community, and their feelings for the past few days.   The following was reported:      Sleep: not good, She didn't use her C Pap machine, but stated that she will do it tonight  Medication: As prescribed  .  Concentration: \"could be a lil better.\"     Appetite: Ok,  Interest level and activities: She had trouble saying no to her son, who wanted her to babysit. She reported that she has   A lot of doctor appointments and didn't want to do it. The group reminded her that she needed to take care of    herself, first.  " She reported that the entire house  Went to the mall for a walk and went out to a Mexican restaurant   and that she had a nice time.     Social contacts: Staff, house-mates, relatives  Feelings: Depression, grief and loss, and she managed the suicidal thoughts and self-harm thoughts.        Psychosis: She has a visual hallucination of a shadow man, and she reported that she told him off, and yelled at him, and he didn't come last night..      Skills used: She is practicing assertiveness with others, keeping appointments, resting, talking to others on the phone, resting, eating healthy foods, and setting boundaries. She watches TV and listens to music. She eats with the other house-mates.              Is this a Weekly Review of the Progress on the Treatment Plan?  Yes.       Are Treatment Plan Goals being addressed?  Yes, continue treatment goals          Are Treatment Plan Strategies to Address Goals Effective?  Yes, continue treatment strategies          Are there any current contracts in place?  No

## 2017-03-06 NOTE — PROGRESS NOTES
Staff faxed completed and signed diabetic form order to [phone]. Form placed in abstracting.    Jenise Rodriguez MA

## 2017-03-06 NOTE — PROGRESS NOTES
Clinic Care Coordination Contact  Care Team Conversations    PCP received form the mental health OT referral per recommendation of a therapist at  [facility]. She is unclear what diagnosis code is needed for the form. Called therapist Magno and he provided the code. Form completed and faxed to [facility]. Copy to abstraction for scanning.     Renata Pulido R.N.  Clinic Care Coordinator  [facility]  [phone]

## 2017-03-06 NOTE — PROGRESS NOTES
I have reviewed the notes and agree with the treatment plan set forth by myself and Kristy Aragon.    Mey TAYLOR

## 2017-03-07 ENCOUNTER — HOSPITAL ENCOUNTER (OUTPATIENT)
Dept: BEHAVIORAL HEALTH | Facility: CLINIC | Age: 57
End: 2017-03-07
Attending: PSYCHOLOGIST
Payer: MEDICARE

## 2017-03-07 DIAGNOSIS — E11.40 TYPE 2 DIABETES MELLITUS WITH DIABETIC NEUROPATHY, WITH LONG-TERM CURRENT USE OF INSULIN (H): ICD-10-CM

## 2017-03-07 DIAGNOSIS — E55.9 VITAMIN D DEFICIENCY: ICD-10-CM

## 2017-03-07 DIAGNOSIS — Z79.4 TYPE 2 DIABETES MELLITUS WITH DIABETIC NEUROPATHY, WITH LONG-TERM CURRENT USE OF INSULIN (H): ICD-10-CM

## 2017-03-07 PROCEDURE — H2012 BEHAV HLTH DAY TREAT, PER HR: HCPCS

## 2017-03-07 RX ORDER — GABAPENTIN 300 MG/1
CAPSULE ORAL
Qty: 168 CAPSULE | OUTPATIENT
Start: 2017-03-07

## 2017-03-07 RX ORDER — CHOLECALCIFEROL (VITAMIN D3) 1250 MCG
CAPSULE ORAL
Qty: 4 CAPSULE | OUTPATIENT
Start: 2017-03-07

## 2017-03-07 NOTE — PROGRESS NOTES
"Adult Mental Health Outpatient   Group Therapy Progress Note     Client Initial Individualized Goals for Treatment:       \" Get help for my anxiety and depression.\"      See Initial Treatment suggestions for the client during the time between Diagnostic Assessment and completion of the Master Individualized Treatment Plan:      Treatment Goals:      1.Client will notify staff when needing assistance to develop or implement a coping plan to manage suicidal or self injurious urges.  2. When in life skills client will learn and practice 1-2 skills to help with better management of stress providing an update of weekly progress.  3. Will report on symptoms and identify skills to use to manage depression, anxiety levels, panic attacks, reduce and recognize Psychosis, and not respond to voices that tell her to harm herself.   4. Will develop an aftercare plan by scheduling an appointment and establishing care with a new psychiatrist.      Area of Treatment Focus:  Symptom Management      Therapeutic Interventions/Treatment Strategies:  Support, Safety Assessments, Problem Solving, Clarification and Education      Response to Treatment Strategies:  Accepted Feedback, Attentive and Alert      Name of Group: 4C Group Therapy a      Description and Outcome:      The client reported being safe today. The group discussed different areas of their lives, and how they are able to care for themselves, get out in the community, and their feelings for the past few days.   The following was reported:      Sleep: not good, She didn't use her C Pap machine, but stated that she will do it tonight, again  Medication: As prescribed  .  Concentration: ok      Appetite: Ok,  Interest level and activities: She practiced taking care of herself and stayed home to rest.  She talked to her son by phone.   She talked with other staff and house-mates.    Social contacts: Staff, house-mates, relatives  Feelings: Depression, grief and loss, and she " managed the suicidal thoughts.  She reported that her sister was sick. The group talked about making a list of people that need prayers / positive thoughts and keep it as a daily contemplation.       Psychosis: She has a visual hallucination of a shadow man, and she reported that she told him off, and yelled at him, and he didn't come last night..He did not come back last night.      Skills used: She is practicing assertiveness with others, keeping appointments, resting, talking to others on the phone, resting, eating healthy foods, and setting boundaries. She watches TV and listens to music. She eats with the other house-mates.        Mental Health Management   The group participated in a 10 minute mindfulness exercise and focused on listening to their breath.  The group learned to observe thoughts and let them come and go into their mind.  The group participated in a discussion about grief and loss.  The group talked about different ways to manage loss issues and what things they might have learned from it.  The group worked on a structured activity to learn skills about gratitude in relation to loss.  The group discussion resulted in them learning a new skill to apply to their daily skills.        Is this a Weekly Review of the Progress on the Treatment Plan?  Yes.       Are Treatment Plan Goals being addressed?  Yes, continue treatment goals          Are Treatment Plan Strategies to Address Goals Effective?  Yes, continue treatment strategies          Are there any current contracts in place?  No

## 2017-03-07 NOTE — PROGRESS NOTES
"Group Therapy Progress Notes   Client Initial Individualized Goals for Treatment:      \" Get help for my anxiety and depression.\"     See Initial Treatment suggestions for the client during the time between Diagnostic Assessment and completion of the Master Individualized Treatment Plan:     Treatment Goals:     1.Client will notify staff when needing assistance to develop or implement a coping plan to manage suicidal or self injurious urges.  2. When in life skills client will learn and practice 1-2 skills to help with better management of stress providing an update of weekly progress.  3. Will report on symptoms and identify skills to use to manage depression, anxiety levels, panic attacks, reduce and recognize Psychosis, and not respond to voices that tell her to harm herself.   4. Will develop an aftercare plan by scheduling an appointment and establishing care with a new psychiatrist    Area of Treatment Focus:  Symptom Management    Therapeutic Interventions/Treatment Strategies:  Support, Feedback, Safety Assessments, Clarification Structured Activity and Education    Response to Treatment Strategies:  Accepted Feedback, Listened, Attentive, Accepted Support and Alert    Name of Group:  Life Skills    Progress Note  Client presented with calm,even mood.Good focus and concentration during a discussion related to stress amangement with a focus on resiliency and control. Thought process clear,goal directed and reality based. Also reviewed grief support services. Informed client that her referral to [facility] ( [facility]) was completed.      Is this a Weekly Review of the Progress on the Treatment Plan?  Yes.      Are Treatment Plan Goals being addressed?  Yes, continue treatment goals      Are Treatment Plan Strategies to Address Goals Effective?  Yes, continue treatment strategies      Are there any current contracts in place?  No         "

## 2017-03-08 ENCOUNTER — ANESTHESIA EVENT (OUTPATIENT)
Dept: GASTROENTEROLOGY | Facility: CLINIC | Age: 57
End: 2017-03-08
Payer: MEDICARE

## 2017-03-08 ENCOUNTER — OFFICE VISIT (OUTPATIENT)
Dept: SURGERY | Facility: CLINIC | Age: 57
End: 2017-03-08

## 2017-03-08 ENCOUNTER — APPOINTMENT (OUTPATIENT)
Dept: SURGERY | Facility: CLINIC | Age: 57
End: 2017-03-08

## 2017-03-08 VITALS
HEART RATE: 98 BPM | DIASTOLIC BLOOD PRESSURE: 57 MMHG | BODY MASS INDEX: 44.49 KG/M2 | OXYGEN SATURATION: 93 % | RESPIRATION RATE: 18 BRPM | HEIGHT: 60 IN | WEIGHT: 226.6 LBS | SYSTOLIC BLOOD PRESSURE: 97 MMHG | TEMPERATURE: 98.1 F

## 2017-03-08 DIAGNOSIS — Z01.818 PREOP GENERAL PHYSICAL EXAM: Primary | ICD-10-CM

## 2017-03-08 NOTE — PROGRESS NOTES
"Group Therapy Progress Notes   Client Initial Individualized Goals for Treatment:      \" Get help for my anxiety and depression.\"     See Initial Treatment suggestions for the client during the time between Diagnostic Assessment and completion of the Master Individualized Treatment Plan:     Treatment Goals:     1.Client will notify staff when needing assistance to develop or implement a coping plan to manage suicidal or self injurious urges.  2. When in life skills client will learn and practice 1-2 skills to help with better management of stress providing an update of weekly progress.  3. Will report on symptoms and identify skills to use to manage depression, anxiety levels, panic attacks, reduce and recognize Psychosis, and not respond to voices that tell her to harm herself.   4. Will develop an aftercare plan by scheduling an appointment and establishing care with a new psychiatrist    Area of Treatment Focus:  Symptom Management    Therapeutic Interventions/Treatment Strategies:  Support, Feedback, Safety Assessments, Clarification Structured Activity and Education    Response to Treatment Strategies:  Accepted Feedback, Listened, Attentive, Accepted Support and Alert    Name of Group:  Life Skills    Progress Note  Client presented with calm,even mood.Good focus and concentration during a discussion related to communication with a focus on listening skills.    Thought process clear,goal directed and reality based.       Is this a Weekly Review of the Progress on the Treatment Plan?  Yes.      Are Treatment Plan Goals being addressed?  Yes, continue treatment goals      Are Treatment Plan Strategies to Address Goals Effective?  Yes, continue treatment strategies      Are there any current contracts in place?  No         "

## 2017-03-08 NOTE — ANESTHESIA PREPROCEDURE EVALUATION
Anesthesia Evaluation     . Pt has had prior anesthetic. Type: General and MAC      ROS/MED HX    ENT/Pulmonary:     (+)sleep apnea, doesn't use CPAP , . .    Neurologic:  - neg neurologic ROS     Cardiovascular:     (+) hypertension--CAD, --. Taking blood thinners Pt has received instructions: Instructions Given to patient: asa, stopped for 5 days. . . :. . Previous cardiac testing Echodate:5/8/2014results:Stress Testdate:1/6/2015 results:ECG reviewed date:1/24/2017 results:Cath date: 5/21/2014 results:          METS/Exercise Tolerance:  >4 METS   Hematologic:  - neg hematologic  ROS       Musculoskeletal:  - neg musculoskeletal ROS       GI/Hepatic:     (+) GERD Asymptomatic on medication, Inflammatory bowel disease,       Renal/Genitourinary:  - ROS Renal section negative       Endo:     (+) type II DM Obesity, .      Psychiatric:     (+) psychiatric history anxiety, depression and schizophrenia      Infectious Disease:  - neg infectious disease ROS       Malignancy:      - no malignancy   Other:    (+) No chance of pregnancy C-spine cleared: N/A, no H/O Chronic Pain,no other significant disability   - neg other ROS           Physical Exam  Normal systems: cardiovascular, pulmonary and dental    Airway   Mallampati: II  TM distance: >3 FB  Neck ROM: full    Dental     Cardiovascular   Rhythm and rate: regular and normal      Pulmonary    breath sounds clear to auscultation    Other findings: Lab Results      Component                Value               Date                      WBC                      7.1                 01/24/2017            Lab Results      Component                Value               Date                      RBC                      3.49                01/24/2017            Lab Results      Component                Value               Date                      HGB                      10.7                01/24/2017            Lab Results      Component                Value                Date                      HCT                      33.0                01/24/2017            No components found for: MCT  Lab Results      Component                Value               Date                      MCV                      95                  01/24/2017            Lab Results      Component                Value               Date                      MCH                      30.7                01/24/2017            Lab Results      Component                Value               Date                      MCHC                     32.4                01/24/2017            Lab Results      Component                Value               Date                      RDW                      13.2                01/24/2017            Lab Results      Component                Value               Date                      PLT                      223                 01/24/2017              Last Basic Metabolic Panel:  Lab Results      Component                Value               Date                      NA                       141                 01/24/2017             Lab Results      Component                Value               Date                      POTASSIUM                4.6                 01/24/2017            Lab Results      Component                Value               Date                      CHLORIDE                 108                 01/24/2017            Lab Results      Component                Value               Date                      ALLEN                      8.6                 01/24/2017            Lab Results      Component                Value               Date                      CO2                      26                  01/24/2017            Lab Results      Component                Value               Date                      BUN                      16                  01/24/2017                BUN                      13.5                05/19/2009            Lab Results       Component                Value               Date                      CR                       0.94                01/24/2017            Lab Results      Component                Value               Date                      GLC                      93                  01/24/2017               Cath 5/21/2014  2 vessel non-obstructive disease. Medical management    1/6/2015 Lexiscan     Indication/Clinical History: Coronary artery disease     Impression  1. Myocardial perfusion imaging using single isotope technique  demonstrated normal myocardial perfusion.   2. Gated images demonstrated normal wall motion and resting images.   The left ventricular systolic function is normal at rest with a  calculated ejection fraction of 56%.       Echo 5/8/2014  Interpretation Summary   Global and regional left ventricular function is normal with an EF of 55-60%.   Global right ventricular function is normal. The inferior vena cava  is   normal. No pericardial effusion is present.   PatientHeight: 61 in   PatientWeight: 185 lbs   SystolicPressure: 129 mmHg   DiastolicPressure: 84 mmHg   BSA 1.8 m^2           Procedure   Echocardiogram with two-dimensional, color and spectral Doppler performed.       Left Ventricle   Global and regional left ventricular function is normal with an EF of 55-60%.       Right Ventricle   The right ventricle is normal size.   Global right ventricular function is normal.       Atria   Mild to moderate left atrial enlargement is present.       Mitral Valve   The mitral valve is normal.   Trace to mild mitral insufficiency is present.       Aortic Valve   Aortic valve is normal in structure and function.       Tricuspid Valve   The tricuspid valve is normal.   Trace tricuspid insufficiency is present.       Pulmonic Valve   The pulmonic valve is normal.       Vessels   The inferior vena cava  is normal.       Pericardium   No pericardial effusion is present.                       PAC Discussion and  Assessment    ASA Classification: 2  Case is suitable for: [place]  Anesthetic techniques and relevant risks discussed: GA  Invasive monitoring and risk discussed: Yes  Types:   Possibility and Risk of blood transfusion discussed: Yes  NPO instructions given:   Additional anesthetic preparation and risks discussed:   Needs early admission to pre-op area:   Other:     PAC Resident/NP Anesthesia Assessment:  Nena Tang is a 58 yo female scheduled for a colonoscopy on 3/16/2017 by Dr. Gonzalez. PAC referral by Dr. Gonzalez for assessment and optimization of anesthesia due to HTN, GERD and DM II. Previous anesthesia without complications.    Cardiac: HTN, daily metoprolol and cozaar. EKG 1/24/2017 SR with left axis deviation. No arrhythmia on event monitor 2/28/2017. Normal stress and ECHO, results above. Cath 5/21/2014 shows 2 vessel non obstructive CAD.  Pulmonary: CINDY, does not use CPAP. Never smoked  GI: GERD and IBS. Daily Prilosec.  Endo: DM II, daily insulin with recent A1C 7.1  Lives at group home, METS >4 and BMI 43.84   Schizophrenia, depression and anxiety     Pt also evaluated by Dr. Gan. See recommendations below.   I spent 20 minutes face to face with pt, assessing, examining, and educating      Reviewed and Signed by PAC Mid-Level Provider/Resident  Mid-Level Provider/Resident: ART Palma  Date: 3/8/2017  Time: 1500    Attending Anesthesiologist Anesthesia Assessment:  57 year old for colonoscopy in management of abdominal pain. Chart reviewed, patient seen and evaluated; agree with above assessment. Stable HTN, GERD, IBM, DM II. Untreated CINDY.     Patient is appropriate for the planned procedure without further workup or medical management change. The final anesthesia plan will be determined by the physician anesthesiologist caring for the patient on the day of surgery.      Reviewed and Signed by PAC Anesthesiologist  Anesthesiologist: sierra   Date: 3/8/2017  Time:   Pass/Fail:  Pass  Disposition:     PAC Pharmacist Assessment:        Pharmacist:   Date:   Time:      Anesthesia Plan      History & Physical Review  History and physical reviewed and following examination; no interval change.    ASA Status:  3 .    NPO Status:  > 8 hours    Plan for MAC with Intravenous induction. Maintenance will be TIVA.  Reason for MAC:  Deep or markedly invasive procedure (G8)  PONV prophylaxis:  Ondansetron (or other 5HT-3) and Other (See comment)       Postoperative Care  Postoperative pain management:  IV analgesics and Multi-modal analgesia.      Consents  Anesthetic plan, risks, benefits and alternatives discussed with:  Patient..                          .

## 2017-03-08 NOTE — H&P
Pre-Operative H & P     CC:  Preoperative exam to assess for increased cardiopulmonary risk while undergoing surgery and anesthesia.    Date of Encounter: 3/8/2017  Primary Care Physician:  No primary care provider on file.    TIFFANIE Tang is a 57 year old female who presents for pre-operative H & P in preparation for colonoscopy with Dr. Gonzalez on 3/16/17 at [facility].     History is obtained from the patient.     Past Medical History  Past Medical History   Diagnosis Date     CAD (coronary artery disease)      5/2014 cath, nonbostructive stenosis to LAD, RCA.     Chronic low back pain 1/22/2013     Cocaine abuse, in remission      Fecal urgency 3/8/2012     History of heroin abuse      Hyperlipidemia LDL goal <100 10/31/2010     Hypertension 7/29/2013     Illiterate 8/30/2011     Irritable bowel syndrome      Migraine 4/19/2012     Migraine headache 4/22/2013     Moderate major depression (H) 6/8/2011     Noncompliance with medication regimen 6/8/2011     Obesity      CINDY (obstructive sleep apnea) 3/8/2012     uses CPAP     Osteopenia 10/7/2009     Schizoaffective disorder, depressive type (H) 2/25/2013     Suicidal intent 10/2/2013     Takotsubo cardiomyopathy      Type 2 diabetes mellitus (H) 8/30/2011     Uterine cancer (H) 1983     Verbal auditory hallucination 10/4/2012           Past Surgical History  Past Surgical History   Procedure Laterality Date     Hysterectomy  1983     uterine cancer yearly pap's per provider.     Coronary cta  5/21/2014     Release trigger finger  10/11/2012     Left thumb. Procedure: RELEASE TRIGGER FINGER;  LEFT THUMB TRIGGER RELEASE;  Surgeon: Tay Langley MD;  Location: [facility] oophorectormy for sho, w/bx  1983     UTERINE     Laparoscopic cholecystectomy  2008     Release trigger finger Right 12/26/2016     Procedure: RELEASE TRIGGER FINGER;  Surgeon: Albino Castañeda MD;  Location:  OR        of  Blood transfusions/reactions: none    Hx of abnormal bleeding or anti-platelet use: none    Menstrual history: Patient's last menstrual period was 01/06/2015.:     Steroid use in the last year: none    Personal or FH with difficulty with Anesthesia:  None        Prior to Admission Medications  Current Outpatient Prescriptions   Medication Sig Dispense Refill     polyethylene glycol (MIRALAX) powder Take 17 g (1 capful) by mouth daily (Patient taking differently: Take 1 capful by mouth every morning ) 510 g 1     gabapentin (NEURONTIN) 300 MG capsule Take 2 capsules (600 mg) by mouth 3 times daily 180 capsule 0     cholecalciferol (VITAMIN D3) 78578 UNITS capsule Take 1 capsule (50,000 Units) by mouth once a week FOR LOW VITAMIN D 7 capsule 0     aspirin (ASPIRIN LOW DOSE) 81 MG EC tablet Take 1 tablet (81 mg) by mouth daily (Patient taking differently: Take 81 mg by mouth every morning ) 100 tablet 4     omeprazole (PRILOSEC) 40 MG capsule Take 1 capsule (40 mg) by mouth daily (Patient taking differently: Take 40 mg by mouth every morning ) 30 capsule 5     atorvastatin (LIPITOR) 80 MG tablet Take 1 tablet (80 mg) by mouth daily (Patient taking differently: Take 80 mg by mouth every morning ) 30 tablet 2     losartan (COZAAR) 100 MG tablet Take 1 tablet (100 mg) by mouth daily (Patient taking differently: Take 100 mg by mouth every morning ) 30 tablet 2     dulaglutide (TRULICITY) 1.5 MG/0.5ML pen Inject 1.5 mg Subcutaneous every 7 days 2 mL 1     blood glucose monitoring (ONE TOUCH ULTRA) test strip Check blood sugar 2 times a day as directed, One Touch Ultra Blue Test Strips Please match with Pt's Insurance and meter. 1 Box 11     metoprolol (TOPROL-XL) 100 MG 24 hr tablet Take 100 mg by mouth every morning  30 tablet 5     insulin aspart (NOVOLOG PEN) 100 UNIT/ML soln Inject 20 Units Subcutaneous 3 times daily (with meals) Inject an extra 7 units once daily for blood sugars over 500mg/dL. Call the clinic if  recheck is over 500mg/dL. 1 Month 1     acetaminophen (TYLENOL) 500 MG tablet Take 2 tablets (1,000 mg) by mouth every 6 hours as needed for pain 100 tablet 0     ibuprofen (ADVIL,MOTRIN) 600 MG tablet Take 1 tablet (600 mg) by mouth every 4 hours as needed for moderate pain 60 tablet 0     melatonin 5 MG CAPS Take 1 capsule by mouth daily At dinnertime 90 capsule 1     glucose 4 G CHEW Take 2 every 15 minutes for blood sugar <70mg/dL. Recheck blood sugar every 15 minutes until above 70mg/dL, then eat a substantial meal. 20 tablet 1     order for DME Hold Novolog if meals are refused. 1 each 0     insulin glargine (LANTUS) 100 UNIT/ML PEN Inject 45 Units Subcutaneous At Bedtime 12 mL 1     blood glucose monitoring (ONE TOUCH DELICA) lancets Use to test blood sugar 2 times daily or as directed.  Ok to substitute alternative if insurance prefers. 1 Box prn     citalopram (CELEXA) 20 MG tablet Take 1 tablet (20 mg) by mouth daily 30 tablet 2     haloperidol (HALDOL) 5 MG tablet Take 1 tablet (5 mg) by mouth At Bedtime 30 tablet 2     benztropine (COGENTIN) 1 MG tablet Take 1 tablet (1 mg) by mouth 2 times daily 60 tablet 3     order for DME Equipment being ordered: Rolling walker 1 Device 0     senna-docusate (SENOKOT-S;PERICOLACE) 8.6-50 MG per tablet Take 1 tablet by mouth 2 times daily as needed for constipation 100 tablet 1       Allergies  Allergies   Allergen Reactions     Imidazole Antifungals Hives     Tolerates diflucan     Ketoprofen Itching     Pruritis to topical     Lisinopril Hives     Metformin Other (See Comments)     Patient hospitalized for lactic acidosis - admitting provider suspectd caused by metformin     Metronidazole Hives       Social History  Social History     Social History     Marital status:      Spouse name: N/A     Number of children: N/A     Years of education: N/A     Occupational History     Not on file.     Social History Main Topics     Smoking status: Never Smoker      Smokeless tobacco: Never Used     Alcohol use Yes      Comment: 2-3 times a week     Drug use: No     Sexual activity: No     Other Topics Concern      Service No     Blood Transfusions No     Caffeine Concern No     Occupational Exposure No     Hobby Hazards No     Sleep Concern Yes     Stress Concern Yes     Weight Concern Yes     Special Diet Yes     DM     Back Care Yes     Exercise Yes     WALKS DAILY     Seat Belt Yes     Self-Exams No     ENCOURAGED     Social History Narrative     10/2014. Has 2 sons. 7 grandchildren.         Unemployed. Graduated HS.         Tobacco use: Denies    Alcohol use: Escalated use since   10/2014    Drug: Denies       Family History  Family History   Problem Relation Age of Onset     CANCER Mother      BLADDER     Respiratory Mother      COPD     GASTROINTESTINAL DISEASE Mother      CIRRHOSIS OF LI BOLIVAR     Alcohol/Drug Mother      DIABETES Mother      Hypertension Mother      Lipids Mother      C.A.D. Mother      Glaucoma Mother      Alcohol/Drug Sister      MENTAL ILLNESS Sister      Alcohol/Drug Sister      Psychotic Disorder Sister      CANCER Maternal Grandmother      UNKNOWN TYPE     CANCER Brother      COLON     Cancer - colorectal Brother      IN HIS LATE 30S     Alcohol/Drug Brother       OF HEROIN OVERDOSE AT AGE 22 YRS     Macular Degeneration No family hx of                  Anesthesia Evaluation     . Pt has had prior anesthetic. Type: General and MAC      ROS/MED HX    ENT/Pulmonary:     (+)sleep apnea, doesn't use CPAP , . .    Neurologic:  - neg neurologic ROS     Cardiovascular:     (+) hypertension--CAD, --. Taking blood thinners Pt has received instructions: Instructions Given to patient: asa, stopped for 5 days. . . :. . Previous cardiac testing Echodate:results:Stress Testdate: results:ECG reviewed date: results:Cath date: results:          METS/Exercise Tolerance:  >4 METS   Hematologic:  - neg hematologic  ROS      "  Musculoskeletal:  - neg musculoskeletal ROS       GI/Hepatic:     (+) Inflammatory bowel disease,       Renal/Genitourinary:  - ROS Renal section negative       Endo:     (+) type II DM Obesity, .      Psychiatric:     (+) psychiatric history anxiety, depression and schizophrenia      Infectious Disease:  - neg infectious disease ROS       Malignancy:      - no malignancy   Other:    (+) No chance of pregnancy C-spine cleared: N/A, no H/O Chronic Pain,no other significant disability   - neg other ROS           Physical Exam  Normal systems: cardiovascular, pulmonary and dental    Airway   Mallampati: II  TM distance: >3 FB  Neck ROM: full    Dental     Cardiovascular   Rhythm and rate: regular and normal      Pulmonary    breath sounds clear to auscultation               The complete review of systems is negative other than noted in the HPI or here.   Temp: 98.1  F (36.7  C) Temp src: Oral BP: 97/57 Pulse: 98   Resp: 18 SpO2: 93 %         226 lbs 9.6 oz  5' 0\"   Body mass index is 44.25 kg/(m^2).       Physical Exam  Constitutional: Awake, alert, cooperative, no apparent distress, and appears stated age.  Eyes: Pupils equal, round and reactive to light, extra ocular muscles intact, sclera clear, conjunctiva normal.  HENT: Normocephalic, oral pharynx with moist mucus membranes, good dentition. No goiter appreciated.   Respiratory: Clear to auscultation bilaterally, no crackles or wheezing.  Cardiovascular: Regular rate and rhythm, normal S1 and S2, and no murmur noted.  Carotids +2, no bruits. No edema. Palpable pulses to radial  DP and PT arteries.   GI: Normal bowel sounds, soft, non-distended, non-tender, no masses palpated, no hepatosplenomegaly.  Surgical scars: bilateral finger and abdomen  Lymph/Hematologic: No cervical lymphadenopathy and no supraclavicular lymphadenopathy.  Genitourinary:  deferred  Skin: Warm and dry.  No rashes at anticipated surgical site.   Musculoskeletal: Full ROM of neck. There is " no redness, warmth, or swelling of the joints. Gross motor strength is normal.    Neurologic: Awake, alert, oriented to name, place and time. Cranial nerves II-XII are grossly intact. Some level of cognitive impairment. Gait is normal.   Neuropsychiatric: Calm, cooperative. Normal affect.     Labs: (personally reviewed)  Lab Results   Component Value Date    WBC 7.1 2017     Lab Results   Component Value Date    RBC 3.49 2017     Lab Results   Component Value Date    HGB 10.7 2017     Lab Results   Component Value Date    HCT 33.0 2017     No components found for: MCT  Lab Results   Component Value Date    MCV 95 2017     Lab Results   Component Value Date    MCH 30.7 2017     Lab Results   Component Value Date    MCHC 32.4 2017     Lab Results   Component Value Date    RDW 13.2 2017     Lab Results   Component Value Date     2017       Last Basic Metabolic Panel:  Lab Results   Component Value Date     2017      Lab Results   Component Value Date    POTASSIUM 4.6 2017     Lab Results   Component Value Date    CHLORIDE 108 2017     Lab Results   Component Value Date    ALLEN 8.6 2017     Lab Results   Component Value Date    CO2 26 2017     Lab Results   Component Value Date    BUN 16 2017    BUN 13.5 2009     Lab Results   Component Value Date    CR 0.94 2017     Lab Results   Component Value Date    GLC 93 2017         EKG: Personally reviewed but formal cardiology read pendin2017 SR with left axis deviation      Cath 2014  2 vessel non-obstructive disease. Medical management    2015 Lexiscan     Indication/Clinical History: Coronary artery disease     Impression  1. Myocardial perfusion imaging using single isotope technique  demonstrated normal myocardial perfusion.   2. Gated images demonstrated normal wall motion and resting images.   The left ventricular systolic function is  normal at rest with a  calculated ejection fraction of 56%.       Echo 5/8/2014  Interpretation Summary   Global and regional left ventricular function is normal with an EF of 55-60%.   Global right ventricular function is normal. The inferior vena cava  is   normal. No pericardial effusion is present.   PatientHeight: 61 in   PatientWeight: 185 lbs   SystolicPressure: 129 mmHg   DiastolicPressure: 84 mmHg   BSA 1.8 m^2           Procedure   Echocardiogram with two-dimensional, color and spectral Doppler performed.       Left Ventricle   Global and regional left ventricular function is normal with an EF of 55-60%.       Right Ventricle   The right ventricle is normal size.   Global right ventricular function is normal.       Atria   Mild to moderate left atrial enlargement is present.       Mitral Valve   The mitral valve is normal.   Trace to mild mitral insufficiency is present.       Aortic Valve   Aortic valve is normal in structure and function.       Tricuspid Valve   The tricuspid valve is normal.   Trace tricuspid insufficiency is present.       Pulmonic Valve   The pulmonic valve is normal.       Vessels   The inferior vena cava  is normal.       Pericardium   No pericardial effusion is present.           ASSESSMENT and PLAN  Nena Tang is a 57 year old female scheduled to undergo colonoscopy. She has the following specific operative considerations:   - RCRI : Low serious cardiac risks.  0.4% risk of major adverse cardiac event.   - Anesthesia considerations:  Refer to PAC assessment in anesthesia records  - VTE risk: low risk procedure  - CINDY # of risks = known CINDY without CPAP use  - Post-op delirium risk: potential for high risk due to cognitive disability  - Risk of PONV score = 2.  If > 2, anti-emetic intervention recommended.      Previous anesthesia without complications.  Cardiac: HTN, daily metoprolol and cozaar. EKG 1/24/2017 SR with left axis deviation. No arrhythmia on event monitor  2/28/2017. Normal stress and ECHO, results above. Cath 5/21/2014 shows 2 vessel CAD.  Pulmonary: CINDY, does not use CPAP. Never smoked  GI: GERD and IBS. Daily Prilosec.  Endo: DM II, daily insulin with recent A1C 7.1  Lives at group home, METS >4 and BMI 43.84   Schizophrenia, depression and anxiety     Will take AM meds after procedure  Patient was discussed with Dr Gan.    ART Ramon CNS  Preoperative Assessment Center  [facility]  Phone: [phone]  Fax: [phone]

## 2017-03-09 ENCOUNTER — TELEPHONE (OUTPATIENT)
Dept: BEHAVIORAL HEALTH | Facility: CLINIC | Age: 57
End: 2017-03-09

## 2017-03-09 ENCOUNTER — OFFICE VISIT (OUTPATIENT)
Dept: SLEEP MEDICINE | Facility: CLINIC | Age: 57
End: 2017-03-09
Payer: MEDICARE

## 2017-03-09 ENCOUNTER — TELEPHONE (OUTPATIENT)
Dept: GASTROENTEROLOGY | Facility: CLINIC | Age: 57
End: 2017-03-09

## 2017-03-09 ENCOUNTER — TELEPHONE (OUTPATIENT)
Dept: FAMILY MEDICINE | Facility: CLINIC | Age: 57
End: 2017-03-09

## 2017-03-09 ENCOUNTER — HOSPITAL ENCOUNTER (OUTPATIENT)
Dept: BEHAVIORAL HEALTH | Facility: CLINIC | Age: 57
End: 2017-03-09
Attending: PSYCHOLOGIST
Payer: MEDICARE

## 2017-03-09 VITALS
HEART RATE: 91 BPM | BODY MASS INDEX: 43.19 KG/M2 | SYSTOLIC BLOOD PRESSURE: 115 MMHG | RESPIRATION RATE: 12 BRPM | OXYGEN SATURATION: 95 % | HEIGHT: 60 IN | WEIGHT: 220 LBS | DIASTOLIC BLOOD PRESSURE: 77 MMHG

## 2017-03-09 DIAGNOSIS — F51.04 PSYCHOPHYSIOLOGICAL INSOMNIA: ICD-10-CM

## 2017-03-09 DIAGNOSIS — F20.9 SCHIZOPHRENIA, UNSPECIFIED TYPE (H): Primary | ICD-10-CM

## 2017-03-09 DIAGNOSIS — G47.33 OSA (OBSTRUCTIVE SLEEP APNEA): Primary | ICD-10-CM

## 2017-03-09 DIAGNOSIS — Z12.11 ENCOUNTER FOR SCREENING COLONOSCOPY: Primary | ICD-10-CM

## 2017-03-09 PROCEDURE — 99214 OFFICE O/P EST MOD 30 MIN: CPT | Performed by: FAMILY MEDICINE

## 2017-03-09 PROCEDURE — H2012 BEHAV HLTH DAY TREAT, PER HR: HCPCS

## 2017-03-09 NOTE — PATIENT INSTRUCTIONS

## 2017-03-09 NOTE — TELEPHONE ENCOUNTER
Phone call, and a message was left for [facility] : Pavel Moore:  [phone]  To coordinate care and get a [facility] waiver for transportation to community services.    Liliya Martin., D,  L.P.  [facility]

## 2017-03-09 NOTE — TELEPHONE ENCOUNTER
Phone call to foster care dad to coordinate care and transportation to  Community support services    America Donnelly, Psy., D,  L.P.  [facility]

## 2017-03-09 NOTE — TELEPHONE ENCOUNTER
Incoming call from an OT, Pauline Cadena, stating that pt would like to be referred to a grief therapy support group.   Please follow up. Pt Contact info: [phone]    Thank you,  Val Valenzuela

## 2017-03-09 NOTE — TELEPHONE ENCOUNTER
[facility] called and stated that they needed a referral placed.  Order is still needed.    Indra Le RN

## 2017-03-09 NOTE — TELEPHONE ENCOUNTER
Writer called [facility] back.  [facility] stated that Pt was referred to them by Magno Casanova in day treatment for her Schizophrenia and depression Order needed for OT.     Will forward to PCP.    Order T'd up.    Indra Le RN

## 2017-03-09 NOTE — PROGRESS NOTES
Sleep Center [facility]  Outpatient Sleep Medicine Follow Up Visit  March 9, 2017           Name: Nena Tang  MRN# [id number]    Age: 56 year old  YOB: 1960          Date of Consultation: March 9, 2017  Consultation is requested by: BRANDON Cooley [facility]  [address] [place] [address] [address] [place] [id number]  Primary care provider: Jud Real    Patient is accompanied by: Patient presents with Caregiver today        Reason for Sleep Consult:       Nena Tang is a 56 year old female patient that presents here for a follow up visit of CINDY on CPAP.          Assessment and Plan:       Summary Sleep Diagnoses:     (1) CINDY  (2) Insomnia     Summary Recommendations:     (1) CINDY: This patient has a previously diagnosed case of CINDY categorized as mild with an AHI of 10.3 events per hour. She was started several years ago on Auto-CPAP and this was effective. However, the patient stopped using it over 2 yrs ago. Pt underwent a PSG sleep study that demonstrated severe CINDY. CPAP titration was completed and this was adequate for the condition. The patient was placed on an Auto-titration CPAP at minimum pressure of 12 cmH2O and maximum pressure of 15 cmH2O. Pt has not been compliant with the PAP use for unknown reasons. The patient states that the interface may be a reason. As such, a new interphase was ordered today. CPAP compliance was discussed with and explained to the patient and caregiver once again today. The importance of the PAP use was explained and emphasized today. Pt will return for a follow up visit within 6 to 8 weeks. Pt will give us a call if she is not able to use the CPAP or anything disturbs her sleep. This patient should be closely followed by the [facility] program. Lifestyle changes including exercises and diet were discussed.     (2) Insomnia: This patient had a poor hygiene with also significant psychiatric co-morbidities. Sleep hygiene and relaxation  techniques were discussed today again to reinforced its importance. Some concepts of CBTI were discussed today. Pt is currently using melatonin as a sleeping aid and this will be continued for now.     Coding:  (G47.33) CINDY (obstructive sleep apnea)  (G47.9) Sleeping difficulties  (G47.09) Other insomnia     Counseling included a comprehensive review of diagnostic and therapeutic strategies as well as risks of inadequate therapy.     Educational materials provided in instructions.     All questions and concerns were addressed today. Pt agrees and understands the assessment and plan.          History of Present Illness:       Nena Tang is a 56 year old Black  LHD female pt with history of DM type 2, irritable bowel syndrome, cardiomyopathy, CAD with stent placement, and HTN presents for a follow up visit today for CINDY currently on CPAP. During the initial encounter, the patient explained that she was diagnosed with CINDY several years ago and she was supposed to be using a CPAP. However, she stopped using the CPAP after her  passed away (pt lost interest in using it). The patient endorsed snoring, gasping / choking for air, and having witnessed apneas. She also reported unrestorative sleep. Pt explained that the experienced nocturnal dyspnea with frequent arousals due to gasping and chocking. Pt explained that she spent a night in the hospital recently and she used a CPAP in the hospital and she was able to sleep perfectly. The patient otherwise denies any morning cephalgia, GERD sxs, nocturia, morning myalgias, or any other sleep related sxs.     The patient was evaluated in 02/2012 by Dr Flowers due to EDS and snoring. Patient underwent a PSG sleep study on 03/01/2012 to evaluate for a SDB. During that study, the pt had a mild case of CINDY with an AHI of 10.3 events per hour. The condition was more prominent during REM and supine sleep. Throughout the study, the pt slept for 396.5 minutes  with a sleep latency of 10 minutes after using zolpidem and Percocet. Given that she was symptomatic, she was placed on an Auto-CPAP machine with minimum pressure of 5 cmH2O and maximum pressure of 15 cmH2O. During the last visit, the pt was being compliant with CPAP use and the CPAP pressures were adjusted to a minimum of 6 cmH2O and a maximum of 12 cmH2O. Pt explains that since the diagnosis she gained over 10 lbs.    Pt explains that when she was using the CPAP, she was able to sleep well. She explains that when using the CPAP no snoring, EDS, or witnessed apneas were noted.    This patient underwent an in-lab PSG sleep study that demonstrated severe CINDY with an AHI of 62.5 events per hour. She also had sleep-related hypoxemia. The condition was adequately titrated with a CPAP at maximum pressure of 13 cmH2O (no REM supine during final pressure) with a residual AHI of 7.1 events per hour. Pt explains that after using the CPAP during the sleep study, she felt more refreshed and rested in the morning. She following day, she explains that she felt better.    Pt has been using an Auto-titrating PAP device with minimum pressure of 12 cmH2O and maximum pressure of 15 cmH2O. The CPAP download shows a compliance of 50% with the patient using the device of less than 4 hours 80% of the time. The last time the patient used the PAP device was on 02/21/2017. Pt shows large leakage. Residual AHI is 4.6 events per hour. The CPAP mean pressure is 12.3 cmH2O. Pt uses a full face interface. The patient explains that the is not sure why she stopped using the PAP device. In face, the explains that when using the device she feels more rested and wake with no snoring, gasping / choking for air, unrestorative sleep, EDS, or any other sxs. Pt states that she likes the CPAP and that she will use it as instructed. When asked about the leakage noted, she states that she is not sure if the mask bothers her and that's shy she stopped using  the machine (this is a patient with some level of cognitive impairment that presents with caregiver).    Pt tells me today that her insomnia is well controlled with the recommendations given. She has incorporated some sleep hygiene given.    PREVIOUS IN- LAB or HOME SLEEP STUDIES: [facility]              Date: Study done in 2012              TYPE: In-lab PSG study              AHI: 10.3 events per hour              Intervention: Auto-CPAP with minium pressure of 5 cmH2O and maximum pressure of 15 cmH2O     SLEEP-WAKE SCHEDULE:       Nena Tang                  -Describes herself as a night person; prefers to go to sleep at 11:00 PM and wakes up at 9:00 AM.                  -Naps 5-6 times per week for 120-180 minutes, feels unrefreshed after naps; takes some inadvertent naps.                  -ON WEEKDAYS, goes to sleep at 10:00 PM during the week; awakens  8:00 AM with an alarm; falls asleep in about 2 hours; has difficulty falling asleep. Pt uses melatonin for sleeping.                  -ON WEEKENDS, goes to sleep at 12:00 AM and wakes up at 11:00 AM with an alarm; falls asleep in over 2 hours.                    -Awakens 2-4 times a night for 60 minutes before falling back to sleep; awakens to uncertain reasons (maybe dreams).       BEDTIME ACTIVITIES AND SHIFT WORK:     Nena Tang                 -Bedtime Activities and Other Sleeping Information: [facility] in a [facility]. Pt has her own bedroom. Pt watches TV in bed. Otherwise, no other activities reported in bed. However, the patient enjoys eating her mails most of the time at night time.                  -Occupation: Pt is not currently working.       SCALES              SLEEP APNEA: [employer] score: 7 / 8              SLEEPINESS: [place] sleepiness scale (ESS):  11 / 24 (initial score)                          Drowsy driving / near accidents: Pt does not drive     SLEEP COMPLAINTS:  Cardio-respiratory                -Snoring:  Snores very loud                -Dyspnea: Pt admits having witnessed apneas              -Morning headaches or confusion: Denies any morning cephalgia              -Coexisting Lung disease: Denies diagnosed or known lung disease at this time                                   -Coexisting Heart disease: Pt was diagnosed recently with heart valvular disease. Pt also had a MI about 6 yrs ago.                                   -Does patient have a bed partner: Patient sleeps alone              -Has bed partner been sleeping separately because of snoring:  N/A      RLS Screen:                  -When you try to relax in the evening or sleep at night, do you ever have unpleasant, restless feelings in your legs that can be relieved by walking or movement? Yes. Pt explains that she moves her legs quite a bit during the day. She explains that, although she moves her legs during the day, this does not keep her up at night time.                 -Periodic limb movement: None reported     Narcolepsy:          - Denies sudden urges of sleep attacks        - Denies cataplexy        - Denies sleep paralysis          - Admits hallucinations. When going to sleep, pt reports some visual hallucinations (someone staring over her).      Sleep Behaviors:        - Denies leg symptoms/movements        - Denies motor restlessness        - Denies night terrors        - Denies bruxism        - Denies automatic behaviors     Other Subjective Complaints:        - Denies anxiety or rumination          - Denies pain and discomfort at night        - Denies waking up with heart pounding or racing        - Denies GERD or aspiration      Parasomnia:               -NREM - Denies recurrent persistent confusional arousal, night eating, sleep walking or sleep terrors                   -REM  - Denies dream enactment; No injuries reported while sleeping.      -Driving Accident or Near Accidents: Pt does not drive          Medications:            Current  Outpatient Prescriptions    Medication  Sig       insulin aspart (NOVOLOG PEN) 100 UNIT/ML soln  Inject 20 Units Subcutaneous 3 times daily (with meals) Inject an extra 7 units once daily for blood sugars over 500mg/dL. Call the clinic if recheck is over 500mg/dL.       acetaminophen (TYLENOL) 500 MG tablet  Take 2 tablets (1,000 mg) by mouth every 6 hours as needed for pain       ibuprofen (ADVIL,MOTRIN) 600 MG tablet  Take 1 tablet (600 mg) by mouth every 4 hours as needed for moderate pain       diphenhydrAMINE (BENADRYL) 25 MG tablet  Take 1 tablet (25 mg) by mouth every 6 hours as needed for itching or allergies       diphenhydrAMINE (BENADRYL) 25 MG tablet  Take 1 tablet (25 mg) by mouth every 6 hours as needed for itching or allergies       melatonin 5 MG CAPS  Take 1 capsule by mouth daily At dinnertime       glucose 4 G CHEW  Take 2 every 15 minutes for blood sugar <70mg/dL. Recheck blood sugar every 15 minutes until above 70mg/dL, then eat a substantial meal.       order for DME  Hold Novolog if meals are refused.       insulin glargine (LANTUS) 100 UNIT/ML PEN  Inject 45 Units Subcutaneous At Bedtime       insulin pen needle 31G X 6 MM  Use 4 daily or as directed.       blood glucose monitoring (ONE TOUCH ULTRA) test strip  Check blood sugar 2 times a day as directed, One Touch Ultra Blue Test Strips Please match with Pt's Insurance and meter.       blood glucose monitoring (ONE TOUCH DELICA) lancets  Use to test blood sugar 2 times daily or as directed.  Ok to substitute alternative if insurance prefers.       citalopram (CELEXA) 20 MG tablet  Take 1 tablet (20 mg) by mouth daily       cholecalciferol (VITAMIN D3) 35627 UNITS capsule  Take 1 capsule (50,000 Units) by mouth once a week FOR LOW VITAMIN D       glipiZIDE (GLIPIZIDE XL) 5 MG 24 hr tablet  Take 1 tablet (5 mg) by mouth daily       isosorbide mononitrate (IMDUR) 60 MG 24 hr tablet  Take 1 tablet (60 mg) by mouth daily       metoprolol  (TOPROL-XL) 100 MG 24 hr tablet  Take 1 tablet (100 mg) by mouth daily .  Take with the 25 mg tablet.  Total = 125 mg daily       metoprolol (TOPROL-XL) 25 MG 24 hr tablet  Take 1 tablet (25 mg) by mouth daily .  Take with the 100 mg tablet.  Total = 125 mg daily       atorvastatin (LIPITOR) 80 MG tablet  Take 1 tablet (80 mg) by mouth daily       gabapentin (NEURONTIN) 300 MG capsule  Take 2 capsules (600 mg) by mouth 3 times daily (Patient taking differently: Take 600 mg by mouth 2 times daily )       haloperidol (HALDOL) 5 MG tablet  Take 1 tablet (5 mg) by mouth At Bedtime       omeprazole (PRILOSEC) 40 MG capsule  Take 1 capsule (40 mg) by mouth daily       benztropine (COGENTIN) 1 MG tablet  Take 1 tablet (1 mg) by mouth 2 times daily       order for DME  Equipment being ordered: Rolling walker       losartan (COZAAR) 100 MG tablet  Take 1 tablet (100 mg) by mouth daily       senna-docusate (SENOKOT-S;PERICOLACE) 8.6-50 MG per tablet  Take 1 tablet by mouth 2 times daily as needed for constipation       aspirin (ASPIRIN LOW DOSE) 81 MG EC tablet  Take 1 tablet (81 mg) by mouth daily       No current facility-administered medications for this visit.                 Allergies    Allergen  Reactions       Imidazole Antifungals  Hives        Tolerates diflucan       Ketoprofen  Itching        Pruritis to topical       Lisinopril  Hives       Metronidazole  Hives               Past Medical History:       Denies needing any 02 supplement at night.            Past Medical History            Past Medical History    Diagnosis  Date       Irritable bowel syndrome         Obesity         Uterine cancer (H)  1983       Type 2 diabetes mellitus (H)  8/30/2011       Illiterate  8/30/2011       Hyperlipidemia LDL goal <100  10/31/2010       Moderate major depression (H)  6/8/2011       Noncompliance with medication regimen  6/8/2011       Osteopenia  10/7/2009       Vitamin B12 deficiency (non anaemic)  11/23/2009        Vitamin D deficiency  1/29/2009       Takotsubo cardiomyopathy         Schizoaffective disorder (H)  9/13/2011       Fecal urgency  3/8/2012       Migraine  4/19/2012       Verbal auditory hallucination  10/4/2012       CINDY (obstructive sleep apnea)  3/8/2012        uses CPAP       CAD (coronary artery disease)          5/2014 cath, nonbostructive stenosis to LAD, RCA.       Chronic low back pain  1/22/2013       Schizoaffective disorder, depressive type (H)  2/25/2013       Migraine headache  4/22/2013       Hypertension  7/29/2013       Suicidal intent  10/2/2013       Cocaine abuse, in remission         History of heroin abuse                   Past Surgical History:     Denies previous upper airway surgery.             Past Surgical History             Past Surgical History    Procedure  Laterality  Date       Laparoscopic cholecystectomy    2008       Hysterectomy    1983       C oophorectormy for sho, w/bx    1983        UTERINE       Release trigger finger    10/11/2012        Left thumb. Procedure: RELEASE TRIGGER FINGER;  LEFT THUMB TRIGGER RELEASE;  Surgeon: Tay Langley MD;  Location: [facility]       Coronary cta    5/21/2014                Social History:              Social History    Substance Use Topics       Smoking status:  Never Smoker        Smokeless tobacco:  Never Used       Alcohol Use:  Yes          Comment: 2-3 times a week       Chemical History:              Tobacco: Never smoked              Uses 1 cups/day of coffee. Last caffeine intake is usually before only in the morning.              Supplements for wakefulness: Patient does not use any supplements to stay awake              EtOH: None Reported  Recreational Drugs: Patient denies using any recreational drugs       Psych Hx:    PHQ2: Positive  Current dangers to self or others: Denies any SI / HI, hallucinations, or delusions.          Family History:              Family History             Family History    Problem  Relation  Age of  Onset       CANCER  Mother          BLADDER       Respiratory  Mother          COPD       GASTROINTESTINAL DISEASE  Mother          CIRRHOSIS OF LI BOLIVAR       Alcohol/Drug  Mother         DIABETES  Mother         Hypertension  Mother         Lipids  Mother         C.A.D.  Mother         Glaucoma  Mother         Alcohol/Drug  Sister         Schizophrenia  Sister         Alcohol/Drug  Sister         Psychotic Disorder  Sister         CANCER  Maternal Grandmother          UNKNOWN TYPE       CANCER  Brother          COLON       Cancer - colorectal  Brother          IN HIS LATE 30S       Alcohol/Drug  Brother           OF HEROIN OVERDOSE AT AGE 22 YRS       Macular Degeneration  No family hx of              Sleep Family Hx:       RLS- None reported         CINDY - Son  Insomnia - None reported  Parasomnia - None reported          Review of Systems:       A complete 10 point review of systems was negative other than HPI or as commented below:      CONSTITUTIONAL: NEGATIVE for fever, sweats or night sweats, drug allergies. Weight gained and chills reported.  EYES: Diplopia and changes in vision reported.  ENT: NEGATIVE for ear pain, sore throat, sinus pain, post-nasal drip, runny nose, bloody nose  CARDIAC: NEGATIVE for chest pain or pressure. Pt reports rapid / irregular heart beats, breathlessness when lying flat, and swollen legs or swollen feet. Pt also reports elevated BP.  NEUROLOGIC: NEGATIVE weakness or numbness in the arms or legs. Occasional headaches during the day.  DERMATOLOGIC: NEGATIVE for rashes, new moles or change in mole(s)  PULMONARY: Pt endorses SOB at rest, SOB with activity, dry cough, productive cough, coughing up blood, wheezing or whistling when breathing.     GASTROINTESTINAL: NEGATIVE for loose or watery stools, fat or grease in stools, constipation, abdominal pain, bowel movements black in color or blood noted. Pt endorses some nausea and vomiting.  GENITOURINARY: NEGATIVE for pain during  urination, blood in urine, urinating more frequently than usual, irregular menstrual periods.  MUSCULOSKELETAL: NEGATIVE for muscle pain. She admits having bone or joint pain as well as swollen joints.  ENDOCRINE: NEGATIVE for increased thirst or urination. Pt has DM and she is on insulin.  LYMPHATIC: NEGATIVE for swollen lymph nodes, lumps or bumps in the breasts or nipple discharge.          Physical Examination:    /77 (BP Location: Right arm, Patient Position: Chair, Cuff Size: Adult Regular)  Pulse 91  Resp 12  Ht 1.524 m (5')  Wt 99.8 kg (220 lb)  LMP 01/06/2015  SpO2 95%  BMI 42.97 kg/m2     Vitals: Reviewed and normal.  General: Alert, oriented, not in distress. Dressed casually; Good eye contact; Comfortably sitting in a chair; in no apparent distress  HEENT: Normocephalic and atraumatic; NL TM x 2; pupils are isocoric and equally responsive to the light. PERRLA. EOMI. Normal fundoscopic examination; Nasal turbinates are normal with a normal septal alignment;  Mallampati score: Grade IV; Tonsillar hypertrophy: 2  visible at pillars; Pharynx with no erythema or exudates. Small and crowded oropharynx with a low-lying soft palate and macroglossia noted.  NECK: neck supple; symmetrical; no lymphadenopathy; no thyromegaly, bruit, JVD noted. Neck circumference of 18 inches (46 cm).  Lungs: both hemithoraces are symmetrical, normal to palpation, no dullness to percussion, auscultation of lungs revealed normal breath sounds with no expirium prolongation, wheezing, rhonci and crackles.  CVS: Normal S1 and S2 heart sounds with no extra heart sounds. No murmur, rubs, or clicks. Normal peripheral pulses throughout with no obvious peripheral edema.  Extremities/musculoskeletal: no peripheral edema, deformity, cyanosis, clubbing  Neurology: awake, alert, and oriented x 3. No obvious gross motor / sensorial deficits with normal strength in all extremities at 5/5 and normal sensation throughout. Cranial nerves are  grossly intact with normal II to XII CN functions.  Psychiatry: Mood and affect are appropriate. Euthymic with affect congruent with full range and intensity. No SI/HI with adequate insight and judgement.          Data: All pertinent previous laboratory data reviewed       No results found for: PH, PHARTERIAL, PO2, [id number], SAT, PCO2, HCO3, BASEEXCESS, GILBERTO, BEB        Lab Results    Component  Value  Date      TSH  0.93  10/25/2016      TSH  1.11  02/15/2016             Lab Results    Component  Value  Date      GLC  356*  11/11/2016      GLC  179*  10/25/2016             Lab Results    Component  Value  Date      HGB  10.2*  11/11/2016      HGB  11.4*  10/25/2016             Lab Results    Component  Value  Date      BUN  21  11/11/2016      BUN  12  10/25/2016      CR  0.96  11/11/2016      CR  0.62  10/25/2016       Echocardiology:              -Recent lexicam cardiac test showed a left ventricular EF calculated at 56%. Normal myocardial perfusion was noted. Also, normal wall motion was observed.     Chest x-ray:              -Most recent chest x-ray films on 02/14/2016 demonstrated mildly enlarged heart with possible increased pulmonary vasculature. Otherwise, no other abnormalities noted.     PFT: None Recent Available     Laboratory Studies:     Component  Value  Ref Range & Units  Status      WBC  6.3  4.0 - 11.0 10e9/L  Final      RBC Count  3.30 (L)  3.8 - 5.2 10e12/L  Final      Hemoglobin  10.2 (L)  11.7 - 15.7 g/dL  Final      Hematocrit  31.5 (L)  35.0 - 47.0 %  Final      MCV  96  78 - 100 fl  Final      MCH  30.9  26.5 - 33.0 pg  Final      MCHC  32.4  31.5 - 36.5 g/dL  Final      RDW  12.5  10.0 - 15.0 %  Final      Platelet Count  199  150 - 450 10e9/L  Final      Diff Method  Automated Method    Final      % Neutrophils  51.6  %  Final      % Lymphocytes  36.6  %  Final      % Monocytes  9.8  %  Final      % Eosinophils  1.1  %  Final      % Basophils  0.3  %  Final      % Immature Granulocytes   0.6  %  Final      Nucleated RBCs  0  0 /100  Final      Absolute Neutrophil  3.3  1.6 - 8.3 10e9/L  Final      Absolute Lymphocytes  2.3  0.8 - 5.3 10e9/L  Final      Absolute Monocytes  0.6  0.0 - 1.3 10e9/L  Final      Absolute Eosinophils  0.1  0.0 - 0.7 10e9/L  Final      Absolute Basophils  0.0  0.0 - 0.2 10e9/L  Final      Abs Immature Granulocytes  0.0  0 - 0.4 10e9/L  Final      Absolute Nucleated RBC  0.0    Final         Sodium  139  133 - 144 mmol/L  Final      Potassium  4.5  3.4 - 5.3 mmol/L  Final      Comment:       Specimen slightly hemolyzed, potassium may be falsely elevated      Chloride  106  94 - 109 mmol/L  Final      Carbon Dioxide  24  20 - 32 mmol/L  Final      Anion Gap  9  3 - 14 mmol/L  Final      Glucose  356 (H)  70 - 99 mg/dL  Final      Urea Nitrogen  21  7 - 30 mg/dL  Final      Creatinine  0.96  0.52 - 1.04 mg/dL  Final      GFR Estimate  60 (L)  >60 mL/min/1.7m2  Final      Comment:       Non  GFR Calc      GFR Estimate If Black  73  >60 mL/min/1.7m2  Final      Comment:        GFR Calc      Calcium  8.1 (L)  8.5 - 10.1 mg/dL  Final      Bilirubin Total  0.2  0.2 - 1.3 mg/dL  Final      Albumin  3.0 (L)  3.4 - 5.0 g/dL  Final      Protein Total  6.9  6.8 - 8.8 g/dL  Final      Alkaline Phosphatase  105  40 - 150 U/L  Final      ALT  18  0 - 50 U/L  Final      AST  19  0 - 45 U/L  Final         Component  Value  Ref Range & Units  Status      Hemoglobin A1C  9.7 (H)  4.3 - 6.0 %  Final         Component  Value  Ref Range & Units  Status      INR    0.86 - 1.14  Final        William Eng MD, MPH  Clinical Sleep Medicine     [facility] [place] [facility] [address], [place] [address] [phone] Clinic     Total time spent with patient: 30 min. Over >50% of the time was spent for face to face counseling, education, and evaluation.

## 2017-03-09 NOTE — NURSING NOTE
Chief Complaint   Patient presents with     RECHECK     f/u pap       Initial /77 (BP Location: Right arm, Patient Position: Chair, Cuff Size: Adult Regular)  Pulse 91  Resp 12  Ht 1.524 m (5')  Wt 99.8 kg (220 lb)  LMP 01/06/2015  SpO2 95%  BMI 42.97 kg/m2 Estimated body mass index is 42.97 kg/(m^2) as calculated from the following:    Height as of this encounter: 1.524 m (5').    Weight as of this encounter: 99.8 kg (220 lb).  Medication Reconciliation: complete     Moraima Aguilar CNA

## 2017-03-09 NOTE — MR AVS SNAPSHOT
After Visit Summary   3/9/2017    Nena Tang    MRN: [id number]           Patient Information     Date Of Birth          1960        Visit Information        Provider Department      3/9/2017 9:30 AM William Eng MD [facility]        Today's Diagnoses     CINDY (obstructive sleep apnea)    -  1      Care Instructions      Your BMI is Body mass index is 42.97 kg/(m^2).  Weight management is a personal decision.  If you are interested in exploring weight loss strategies, the following discussion covers the approaches that may be successful. Body mass index (BMI) is one way to tell whether you are at a healthy weight, overweight, or obese. It measures your weight in relation to your height.  A BMI of 18.5 to 24.9 is in the healthy range. A person with a BMI of 25 to 29.9 is considered overweight, and someone with a BMI of 30 or greater is considered obese. More than two-thirds of American adults are considered overweight or obese.  Being overweight or obese increases the risk for further weight gain. Excess weight may lead to heart disease and diabetes.  Creating and following plans for healthy eating and physical activity may help you improve your health.  Weight control is part of healthy lifestyle and includes exercise, emotional health, and healthy eating habits. Careful eating habits lifelong are the mainstay of weight control. Though there are significant health benefits from weight loss, long-term weight loss with diet alone may be very difficult to achieve- studies show long-term success with dietary management in less than 10% of people. Attaining a healthy weight may be especially difficult to achieve in those with severe obesity. In some cases, medications, devices and surgical management might be considered.  What can you do?  If you are overweight or obese and are interested in methods for weight loss, you should discuss this with your provider.      Consider reducing daily calorie intake by 500 calories.     Keep a food journal.     Avoiding skipping meals, consider cutting portions instead.    Diet combined with exercise helps maintain muscle while optimizing fat loss. Strength training is particularly important for building and maintaining muscle mass. Exercise helps reduce stress, increase energy, and improves fitness. Increasing exercise without diet control, however, may not burn enough calories to loose weight.       Start walking three days a week 10-20 minutes at a time    Work towards walking thirty minutes five days a week     Eventually, increase the speed of your walking for 1-2 minutes at time    In addition, we recommend that you review healthy lifestyles and methods for weight loss available through the National Institutes of Health patient information sites:  http://win.niddk.nih.gov/publications/index.htm    And look into health and wellness programs that may be available through your health insurance provider, employer, local community center, or roxane club.    Weight management plan: Patient was referred to their PCP to discuss a diet and exercise plan.  Obtain new mask for the CPAP. In the meanwhile, continue using full face mask. Use it every time you sleep. Follow up with me within 6 to 8 weeks.    Thanks!    William Eng MD, MPH  Clinical Sleep and Occupational / Environmental Medicine        Follow-ups after your visit        Your next 10 appointments already scheduled     Mar 09, 2017  1:00 PM CST   Treatment with ADULT  DAY 4C   [facility] ([facility])    [address] [address]   [phone]            Mar 16, 2017   Procedure with Traci Gonzalez MD   [facility] [place] ([facility])    [address]  [facility] [address]   [phone]           [facility] is  located on the corner of [address] and [address] on the [facility]. It is easily accessible from virtually any point in the [place] area, via [address] and [address].            Apr 05, 2017  2:00 PM CDT   Return Visit with Usman Hodgson PA-C   [facility] ([facility])    [address]  [address]  [facility] [place] [address]   [phone]            May 25, 2017 10:15 AM CDT   RETURN RETINA with Tiburcio Chacon MD   [facility] ([facility])    [address]  [address]  [facility] [place] [id number] [phone]              Future tests that were ordered for you today     Open Future Orders        Priority Expected Expires Ordered    Basic metabolic panel Routine 3/8/2017 4/7/2017 3/8/2017    CBC with platelets Routine 3/8/2017 4/7/2017 3/8/2017            Who to contact     If you have questions or need follow up information about today's clinic visit or your schedule please contact [facility] directly at [phone].  Normal or non-critical lab and imaging results will be communicated to you by MyChart, letter or phone within 4 business days after the clinic has received the results. If you do not hear from us within 7 days, please contact the clinic through [employer] or phone. If you have a critical or abnormal lab result, we will notify you by phone as soon as possible.  Submit refill requests through agreement24 [email or web address] or call your pharmacy and they will forward the refill request to us. Please allow 3 business days for your refill to be completed.          Additional Information About Your Visit        [email or web address] Information     agreement24 [email or web address] gives you secure access to your electronic health record. If you see a primary care provider, you can also send messages to your care team and make appointments. If you have questions, please call your primary  care clinic.  If you do not have a primary care provider, please call [phone] and they will assist you.        Care EveryWhere ID     This is your Care EveryWhere ID. This could be used by other organizations to access your [facility] medical records  [id number]        Your Vitals Were     Pulse Respirations Height Last Period Pulse Oximetry BMI (Body Mass Index)    91 12 1.524 m (5') 01/06/2015 95% 42.97 kg/m2       Blood Pressure from Last 3 Encounters:   03/09/17 115/77   03/08/17 97/57   03/03/17 150/79    Weight from Last 3 Encounters:   03/09/17 99.8 kg (220 lb)   03/08/17 102.8 kg (226 lb 9.6 oz)   03/03/17 101.6 kg (224 lb)              We Performed the Following     Comprehensive DME          Today's Medication Changes          These changes are accurate as of: 3/9/17 10:18 AM.  If you have any questions, ask your nurse or doctor.               These medicines have changed or have updated prescriptions.        Dose/Directions    aspirin 81 MG EC tablet   Commonly known as:  ASPIRIN LOW DOSE   This may have changed:  when to take this   Used for:  Coronary artery disease involving native heart with angina pectoris, unspecified vessel or lesion type (H)        Dose:  81 mg   Take 1 tablet (81 mg) by mouth daily   Quantity:  100 tablet   Refills:  4       atorvastatin 80 MG tablet   Commonly known as:  LIPITOR   This may have changed:  when to take this   Used for:  Hyperlipidemia LDL goal <100        Dose:  80 mg   Take 1 tablet (80 mg) by mouth daily   Quantity:  30 tablet   Refills:  2       losartan 100 MG tablet   Commonly known as:  COZAAR   This may have changed:  when to take this   Used for:  Coronary artery disease involving native heart with angina pectoris, unspecified vessel or lesion type (H)        Dose:  100 mg   Take 1 tablet (100 mg) by mouth daily   Quantity:  30 tablet   Refills:  2       omeprazole 40 MG capsule   Commonly known as:  priLOSEC   This may have changed:  when to take  this   Used for:  Gastroesophageal reflux disease without esophagitis        Dose:  40 mg   Take 1 capsule (40 mg) by mouth daily   Quantity:  30 capsule   Refills:  5       polyethylene glycol powder   Commonly known as:  MIRALAX   This may have changed:  when to take this   Used for:  Constipation, unspecified constipation type        Dose:  1 capful   Take 17 g (1 capful) by mouth daily   Quantity:  510 g   Refills:  1                Primary Care Provider    None Specified       No primary provider on file.        Goals        General    Medical (pt-stated)     Notes - Note created  7/7/2016  9:15 AM by Chelsea Pulido RN    Get blood sugars under control    Improve medication compliance    As of today's date 7/7/2016 goal is met at 0 - 25%.   Goal Status:  Active          Thank you!     Thank you for choosing [facility]  for your care. Our goal is always to provide you with excellent care. Hearing back from our patients is one way we can continue to improve our services. Please take a few minutes to complete the written survey that you may receive in the mail after your visit with us. Thank you!             Your Updated Medication List - Protect others around you: Learn how to safely use, store and throw away your medicines at www.disposemymeds.org.          This list is accurate as of: 3/9/17 10:18 AM.  Always use your most recent med list.                   Brand Name Dispense Instructions for use    acetaminophen 500 MG tablet    TYLENOL    100 tablet    Take 2 tablets (1,000 mg) by mouth every 6 hours as needed for pain       aspirin 81 MG EC tablet    ASPIRIN LOW DOSE    100 tablet    Take 1 tablet (81 mg) by mouth daily       atorvastatin 80 MG tablet    LIPITOR    30 tablet    Take 1 tablet (80 mg) by mouth daily       benztropine 1 MG tablet    COGENTIN    60 tablet    Take 1 tablet (1 mg) by mouth 2 times daily       blood glucose monitoring lancets     1 Box    Use to  test blood sugar 2 times daily or as directed.  Ok to substitute alternative if insurance prefers.       blood glucose monitoring test strip    ONE TOUCH ULTRA    1 Box    Check blood sugar 2 times a day as directed, One Touch Ultra Blue Test Strips Please match with Pt's Insurance and meter.       cholecalciferol 21320 UNITS capsule    VITAMIN D3    7 capsule    Take 1 capsule (50,000 Units) by mouth once a week FOR LOW VITAMIN D       citalopram 20 MG tablet    celeXA    30 tablet    Take 1 tablet (20 mg) by mouth daily       dulaglutide 1.5 MG/0.5ML pen    TRULICITY    2 mL    Inject 1.5 mg Subcutaneous every 7 days       gabapentin 300 MG capsule    NEURONTIN    180 capsule    Take 2 capsules (600 mg) by mouth 3 times daily       glucose 4 G Chew chewable tablet     20 tablet    Take 2 every 15 minutes for blood sugar <70mg/dL. Recheck blood sugar every 15 minutes until above 70mg/dL, then eat a substantial meal.       haloperidol 5 MG tablet    HALDOL    30 tablet    Take 1 tablet (5 mg) by mouth At Bedtime       ibuprofen 600 MG tablet    ADVIL/MOTRIN    60 tablet    Take 1 tablet (600 mg) by mouth every 4 hours as needed for moderate pain       insulin aspart 100 UNIT/ML injection    NovoLOG PEN    1 Month    Inject 20 Units Subcutaneous 3 times daily (with meals) Inject an extra 7 units once daily for blood sugars over 500mg/dL. Call the clinic if recheck is over 500mg/dL.       insulin glargine 100 UNIT/ML injection    LANTUS    12 mL    Inject 45 Units Subcutaneous At Bedtime       losartan 100 MG tablet    COZAAR    30 tablet    Take 1 tablet (100 mg) by mouth daily       melatonin 5 MG Caps     90 capsule    Take 1 capsule by mouth daily At dinnertime       metoprolol 100 MG 24 hr tablet    TOPROL-XL    30 tablet    Take 100 mg by mouth every morning       omeprazole 40 MG capsule    priLOSEC    30 capsule    Take 1 capsule (40 mg) by mouth daily       * order for DME     1 Device    Equipment being  ordered: Rolling walker       * order for DME     1 each    Hold Novolog if meals are refused.       polyethylene glycol powder    MIRALAX    510 g    Take 17 g (1 capful) by mouth daily       senna-docusate 8.6-50 MG per tablet    SENOKOT-S;PERICOLACE    100 tablet    Take 1 tablet by mouth 2 times daily as needed for constipation       * Notice:  This list has 2 medication(s) that are the same as other medications prescribed for you. Read the directions carefully, and ask your doctor or other care provider to review them with you.

## 2017-03-09 NOTE — TELEPHONE ENCOUNTER
Incoming call from Magno Casanova, OT with Day Treatment, requesting OT orders. Pt was referred to [facility] ([facility]); OT orders are needed in order for pt to be seen.   [facility] contact info: [phone]  [facility]    Val Valenzuela

## 2017-03-09 NOTE — TELEPHONE ENCOUNTER
Care Coordination Request    SW receives referral from  indicating patients Day [facility], OT requesting SW to follow up with patient stating that patient would like resources for grief and loss support group. SW contacts patient and provides resources including: [facility] for Grief support in [facility] and [facility] support group in [place]. Patient also requests to mail out details. JESSICA prints off resources and mails to patients residence.     MACIEL Burgos  Social Work Care Coordinator

## 2017-03-09 NOTE — PROGRESS NOTES
"Adult Mental Health Outpatient Group Therapy Progress Note      Client Initial Individualized Goals for Treatment:       \" Get help for my anxiety and depression.\"      See Initial Treatment suggestions for the client during the time between Diagnostic Assessment and completion of the Master Individualized Treatment Plan:      Treatment Goals:      1.Client will notify staff when needing assistance to develop or implement a coping plan to manage suicidal or self injurious urges.  2. When in life skills client will learn and practice 1-2 skills to help with better management of stress providing an update of weekly progress.  3. Will report on symptoms and identify skills to use to manage depression, anxiety levels, panic attacks, reduce and recognize Psychosis, and not respond to voices that tell her to harm herself.   4. Will develop an aftercare plan by scheduling an appointment and establishing care with a new psychiatrist.      Area of Treatment Focus:  Symptom Management      Therapeutic Interventions/Treatment Strategies:  Support, Safety Assessments, Problem Solving, Clarification and Education      Response to Treatment Strategies:  Accepted Feedback, Attentive and Alert      Name of Group: 4C Group Therapy       Description and Outcome:      The client reported being safe today. The group discussed different areas of their lives, and how they are able to care for themselves, get out in the community, and their feelings for the past few days.   The following was reported:      Sleep: She didn't use her C Pap machine, but stated that she will do it tonight, and got a call from the Sleep Clinic  Medication: As prescribed  .  Concentration: ok     Appetite: Ok,  Interest level and activities: She was at home resting. She talks to her sons on the phone, watches TV shows, and listens   To music. the past weekend, she went for a walk with the others in the house, at the mall, and said it was crowded.     Social " contacts: Staff, house-mates, relatives, grandkids.  Feelings: Depression, grief and loss.  She was observed interacting with other group members.    Psychosis: She has a visual hallucination of a shadow man, and he comes at night. She keeps the light on and   When he appears, she yells at him, and then he goes away.  He scares her, since he has eyes that glare at her.  During group she smelled something that was burning.   The group members and staff could not smell anything burning.   She reported problems with paranoia.      Skills used: She is practicing assertiveness with others, keeping appointments, resting, talking to others on the phone,  And setting boundaries.               Is this a Weekly Review of the Progress on the Treatment Plan?  Yes.       Are Treatment Plan Goals being addressed?  Yes, continue treatment goals          Are Treatment Plan Strategies to Address Goals Effective?  Yes, continue treatment strategies          Are there any current contracts in place?  No

## 2017-03-10 ENCOUNTER — TELEPHONE (OUTPATIENT)
Dept: SLEEP MEDICINE | Facility: CLINIC | Age: 57
End: 2017-03-10

## 2017-03-10 NOTE — PROGRESS NOTES
"Group Therapy Progress Notes   Client Initial Individualized Goals for Treatment:      \" Get help for my anxiety and depression.\"     See Initial Treatment suggestions for the client during the time between Diagnostic Assessment and completion of the Master Individualized Treatment Plan:     Treatment Goals:     1.Client will notify staff when needing assistance to develop or implement a coping plan to manage suicidal or self injurious urges.  2. When in life skills client will learn and practice 1-2 skills to help with better management of stress providing an update of weekly progress.  3. Will report on symptoms and identify skills to use to manage depression, anxiety levels, panic attacks, reduce and recognize Psychosis, and not respond to voices that tell her to harm herself.   4. Will develop an aftercare plan by scheduling an appointment and establishing care with a new psychiatrist    Area of Treatment Focus:  Symptom Management    Therapeutic Interventions/Treatment Strategies:  Support, Feedback, Safety Assessments, Clarification Structured Activity and Education    Response to Treatment Strategies:  Accepted Feedback, Listened, Attentive, Accepted Support and Alert    Name of Group:  Life Skills    Progress Note  Client presented with calm,even mood.Good focus and concentration during a discussion related to self esteem with a focus on learning strategies to help decrease procrastination.Thought process clear,goal directed and reality based. Client requested to stay until the end of the month.He discharge date will be 3/30/17. He discharge plan will include a grief support group in [facility] ([facility]) and [facility] if she could get transportation.       Is this a Weekly Review of the Progress on the Treatment Plan?  Yes.      Are Treatment Plan Goals being addressed?  Yes, continue treatment goals      Are Treatment Plan Strategies to Address Goals Effective?  Yes, " continue treatment strategies      Are there any current contracts in place?  No

## 2017-03-14 ENCOUNTER — TELEPHONE (OUTPATIENT)
Dept: BEHAVIORAL HEALTH | Facility: CLINIC | Age: 57
End: 2017-03-14

## 2017-03-14 NOTE — TELEPHONE ENCOUNTER
Incoming call from Magno Casanoav stating that [facility] still needs referral orders.     Val Valenzuela

## 2017-03-14 NOTE — TELEPHONE ENCOUNTER
Referral in chart for OT.  Writer called and LVM for Magno.  Question if referral needs to say something more specific.    Indra Le RN

## 2017-03-14 NOTE — TELEPHONE ENCOUNTER
Phone call from SIVA Calvillo worker: [facility]:  [phone]  Who coordinated care for Nena.  Her  is Kelsea Heredia: [place] , [phone]    Liliya Martin., D,  L.P.  Adult Day Tx

## 2017-03-14 NOTE — TELEPHONE ENCOUNTER
Phone call to Kelsea Heredia [facility] , located at [facility]  [phone]    Coordinate care    Liliya Martin., D,  L.P.  Adult Day Tx

## 2017-03-15 ENCOUNTER — TRANSFERRED RECORDS (OUTPATIENT)
Dept: HEALTH INFORMATION MANAGEMENT | Facility: CLINIC | Age: 57
End: 2017-03-15

## 2017-03-15 DIAGNOSIS — Z79.4 TYPE 2 DIABETES MELLITUS WITH DIABETIC NEUROPATHY, WITH LONG-TERM CURRENT USE OF INSULIN (H): ICD-10-CM

## 2017-03-15 DIAGNOSIS — E11.40 TYPE 2 DIABETES MELLITUS WITH DIABETIC NEUROPATHY, WITH LONG-TERM CURRENT USE OF INSULIN (H): ICD-10-CM

## 2017-03-15 RX ORDER — GABAPENTIN 300 MG/1
CAPSULE ORAL
Qty: 168 CAPSULE | Refills: 1 | Status: SHIPPED | OUTPATIENT
Start: 2017-03-15 | End: 2017-05-02

## 2017-03-15 NOTE — TELEPHONE ENCOUNTER
gabapentin (NEURONTIN) 300 MG capsule  Controlled Substance Refill Request    Last refill: 2/27/17    Last clinic visit: 3/3/17    Next appt: 4/5/17    Controlled substance agreement on file: No.    Documentation in problem list reviewed:  Yes    Processing:  E scribe    RX [place] reviewed:  reviewed- no concerns  [place]:  [email or web address]    Indra Le RN

## 2017-03-16 ENCOUNTER — ANESTHESIA (OUTPATIENT)
Dept: GASTROENTEROLOGY | Facility: CLINIC | Age: 57
End: 2017-03-16
Payer: MEDICARE

## 2017-03-16 ENCOUNTER — HOSPITAL ENCOUNTER (OUTPATIENT)
Facility: CLINIC | Age: 57
Discharge: HOME | End: 2017-03-16
Attending: INTERNAL MEDICINE | Admitting: INTERNAL MEDICINE
Payer: MEDICARE

## 2017-03-16 ENCOUNTER — SURGERY (OUTPATIENT)
Age: 57
End: 2017-03-16

## 2017-03-16 VITALS
RESPIRATION RATE: 15 BRPM | BODY MASS INDEX: 43.19 KG/M2 | HEART RATE: 90 BPM | TEMPERATURE: 98.3 F | HEIGHT: 60 IN | OXYGEN SATURATION: 95 % | DIASTOLIC BLOOD PRESSURE: 89 MMHG | SYSTOLIC BLOOD PRESSURE: 163 MMHG | WEIGHT: 220 LBS

## 2017-03-16 LAB — GLUCOSE BLDC GLUCOMTR-MCNC: 146 MG/DL (ref 70–99)

## 2017-03-16 PROCEDURE — 25800025 ZZH RX 258: Performed by: NURSE ANESTHETIST, CERTIFIED REGISTERED

## 2017-03-16 PROCEDURE — 82962 GLUCOSE BLOOD TEST: CPT

## 2017-03-16 PROCEDURE — G0121 COLON CA SCRN NOT HI RSK IND: HCPCS | Performed by: INTERNAL MEDICINE

## 2017-03-16 PROCEDURE — 37000009 ZZH ANESTHESIA TECHNICAL FEE, EACH ADDTL 15 MIN: Performed by: INTERNAL MEDICINE

## 2017-03-16 PROCEDURE — 25000128 H RX IP 250 OP 636: Performed by: NURSE ANESTHETIST, CERTIFIED REGISTERED

## 2017-03-16 PROCEDURE — 45378 DIAGNOSTIC COLONOSCOPY: CPT | Performed by: INTERNAL MEDICINE

## 2017-03-16 PROCEDURE — 25000125 ZZHC RX 250: Performed by: NURSE ANESTHETIST, CERTIFIED REGISTERED

## 2017-03-16 PROCEDURE — 37000008 ZZH ANESTHESIA TECHNICAL FEE, 1ST 30 MIN: Performed by: INTERNAL MEDICINE

## 2017-03-16 RX ORDER — GLYCOPYRROLATE 0.2 MG/ML
INJECTION, SOLUTION INTRAMUSCULAR; INTRAVENOUS PRN
Status: DISCONTINUED | OUTPATIENT
Start: 2017-03-16 | End: 2017-03-16

## 2017-03-16 RX ORDER — FENTANYL CITRATE 50 UG/ML
25-50 INJECTION, SOLUTION INTRAMUSCULAR; INTRAVENOUS
Status: CANCELLED | OUTPATIENT
Start: 2017-03-16

## 2017-03-16 RX ORDER — NALOXONE HYDROCHLORIDE 0.4 MG/ML
.1-.4 INJECTION, SOLUTION INTRAMUSCULAR; INTRAVENOUS; SUBCUTANEOUS
Status: CANCELLED | OUTPATIENT
Start: 2017-03-16 | End: 2017-03-17

## 2017-03-16 RX ORDER — SODIUM CHLORIDE, SODIUM LACTATE, POTASSIUM CHLORIDE, CALCIUM CHLORIDE 600; 310; 30; 20 MG/100ML; MG/100ML; MG/100ML; MG/100ML
INJECTION, SOLUTION INTRAVENOUS CONTINUOUS
Status: CANCELLED | OUTPATIENT
Start: 2017-03-16

## 2017-03-16 RX ORDER — MEPERIDINE HYDROCHLORIDE 25 MG/ML
12.5 INJECTION INTRAMUSCULAR; INTRAVENOUS; SUBCUTANEOUS
Status: CANCELLED | OUTPATIENT
Start: 2017-03-16

## 2017-03-16 RX ORDER — ONDANSETRON 4 MG/1
4 TABLET, ORALLY DISINTEGRATING ORAL EVERY 30 MIN PRN
Status: CANCELLED | OUTPATIENT
Start: 2017-03-16

## 2017-03-16 RX ORDER — ONDANSETRON 2 MG/ML
4 INJECTION INTRAMUSCULAR; INTRAVENOUS EVERY 30 MIN PRN
Status: CANCELLED | OUTPATIENT
Start: 2017-03-16

## 2017-03-16 RX ORDER — SODIUM CHLORIDE, SODIUM LACTATE, POTASSIUM CHLORIDE, CALCIUM CHLORIDE 600; 310; 30; 20 MG/100ML; MG/100ML; MG/100ML; MG/100ML
INJECTION, SOLUTION INTRAVENOUS CONTINUOUS PRN
Status: DISCONTINUED | OUTPATIENT
Start: 2017-03-16 | End: 2017-03-16

## 2017-03-16 RX ORDER — PROPOFOL 10 MG/ML
INJECTION, EMULSION INTRAVENOUS CONTINUOUS PRN
Status: DISCONTINUED | OUTPATIENT
Start: 2017-03-16 | End: 2017-03-16

## 2017-03-16 RX ORDER — LIDOCAINE HYDROCHLORIDE 20 MG/ML
INJECTION, SOLUTION INFILTRATION; PERINEURAL PRN
Status: DISCONTINUED | OUTPATIENT
Start: 2017-03-16 | End: 2017-03-16

## 2017-03-16 RX ADMIN — GLYCOPYRROLATE 0.2 MG: 0.2 INJECTION, SOLUTION INTRAMUSCULAR; INTRAVENOUS at 12:20

## 2017-03-16 RX ADMIN — MIDAZOLAM HYDROCHLORIDE 2 MG: 1 INJECTION, SOLUTION INTRAMUSCULAR; INTRAVENOUS at 12:04

## 2017-03-16 RX ADMIN — LIDOCAINE HYDROCHLORIDE 50 MG: 20 INJECTION, SOLUTION INFILTRATION; PERINEURAL at 12:10

## 2017-03-16 RX ADMIN — PROPOFOL 100 MCG/KG/MIN: 10 INJECTION, EMULSION INTRAVENOUS at 12:10

## 2017-03-16 RX ADMIN — SODIUM CHLORIDE, POTASSIUM CHLORIDE, SODIUM LACTATE AND CALCIUM CHLORIDE: 600; 310; 30; 20 INJECTION, SOLUTION INTRAVENOUS at 12:04

## 2017-03-16 NOTE — ANESTHESIA CARE TRANSFER NOTE
Patient: Nena Tang    Procedure(s):  Colonoscopy/Dx:Screening/Golytely & pre-op info-PAC # mailed - Wound Class: II-Clean Contaminated    Diagnosis: Colonoscopy W Mac/Dx:Screening/Golytely & pre-op info-PAC # mailed  Diagnosis Additional Information: No value filed.    Anesthesia Type:   MAC     Note:  Airway :Room Air  Patient transferred to:Phase II  Comments: VSS. Ventilating well.      Vitals: (Last set prior to Anesthesia Care Transfer)    CRNA VITALS  3/16/2017 1205 - 3/16/2017 1241      3/16/2017             Resp Rate (set): 10                Electronically Signed By: ART Nam CRNA  March 16, 2017  12:41 PM

## 2017-03-16 NOTE — OR NURSING
Procedure: Colonoscopy /s intervention.  Sedation: Anesthesia Assist Sedation  Specimens: None.  O2: See Anesthesia Record (EPIC)  Note: Pt tolerated procedure well.   Transport: Pt transferred to Endo Post-procedure via cart. Siderails elevated porfirio. Accompanied by CRNA.  Report:  Bedside report given upon completion of transport to post-procedure room by CRNA.  Total sedation time:  See Anesthesia Record (EPIC)    Additional Notes: Paper tape placed over earrings by Anesthesiologist prior to procedure.     Camilla Suarez RN  [facility]

## 2017-03-16 NOTE — IP AVS SNAPSHOT
[facility], [place], Endoscopy    [address] [address]    Phone:  [phone]                                       After Visit Summary   3/16/2017    Nena Tang    MRN: [id number]           After Visit Summary Signature Page     I have received my discharge instructions, and my questions have been answered. I have discussed any challenges I see with this plan with the nurse or doctor.    ..........................................................................................................................................  Patient/Patient Representative Signature      ..........................................................................................................................................  Patient Representative Print Name and Relationship to Patient    ..................................................               ................................................  Date                                            Time    ..........................................................................................................................................  Reviewed by Signature/Title    ...................................................              ..............................................  Date                                                            Time

## 2017-03-16 NOTE — IP AVS SNAPSHOT
MRN:[id number]                      After Visit Summary   3/16/2017    Nena Tang    MRN: [id number]           Thank you!     Thank you for choosing [facility] for your care. Our goal is always to provide you with excellent care. Hearing back from our patients is one way we can continue to improve our services. Please take a few minutes to complete the written survey that you may receive in the mail after you visit with us. Thank you!        Patient Information     Date Of Birth          1960        About your hospital stay     You were admitted on:  March 16, 2017 You last received care in the:  [facility], Endoscopy    You were discharged on:  March 16, 2017       Who to Call     For medical emergencies, please call 911.  For non-urgent questions about your medical care, please call your primary care provider or clinic, None  For questions related to your surgery, please call your surgery clinic        Attending Provider     Provider Traci Guillaume MD Internal Medicine       Primary Care Provider    None Specified       No primary provider on file.        Your next 10 appointments already scheduled     Mar 16, 2017  3:30 PM CDT   Treatment with ADULT MH DAY 4C   [facility] ([facility])    [address] [place] [address]   [phone]            Mar 20, 2017  3:30 PM CDT   Treatment with ADULT MH DAY 4C   [facility] ([facility])    [address] [place] [address]   [phone]            Mar 21, 2017  3:30 PM CDT   Treatment with ADULT MH DAY 4C   [facility] ([facility])    [address] [place] [address]   [phone]            Mar 23, 2017  3:30 PM CDT   Treatment with ADULT MH DAY 4C   [facility] ([facility])    [address] [address]   [phone]            Mar 27, 2017  3:30 PM CDT   Treatment with ADULT MH DAY 4C   [facility] ([facility])    [address] [address]   [phone]            Mar 28, 2017  3:30 PM CDT   Treatment with ADULT MH DAY 4C   [facility] ([facility])    [address] [address]   [phone]            Mar 30, 2017  3:30 PM CDT   Treatment with ADULT MH DAY 4C   [facility] ([facility])    [facility] [address] [address]   [phone]            Apr 05, 2017  2:00 PM CDT   Return Visit with Usman Hodgson PA-C   [facility] ([facility])    [address] [place] [address]  [address]  [facility] [address]   [phone]            May 11, 2017  9:30 AM CDT   Return Sleep Patient with William Eng MD   [facility] ([facility])    [address] [place] [address]  [facility] [address] [phone]            May 25, 2017 10:15 AM CDT   RETURN RETINA with Tiburcio Chacon MD   [facility])    [address]  [address] [place] [address] Clin [address]  [facility] [address]   [phone]              Pending Results     No orders found from 3/14/2017 to 3/17/2017.            Admission Information     Date & Time Provider Department Dept. Phone    3/16/2017 Traci Gonzalez MD [facility] [place], Endoscopy [phone]      Your Vitals Were     Blood Pressure Pulse Temperature Respirations Height Weight    163/89 90 98.3  F (36.8  C) (Oral) 15 1.524 m (5') 99.8 kg (220 lb)    Last Period Pulse Oximetry BMI  (Body Mass Index)             01/06/2015 95% 42.97 kg/m2         [employer] Information     [employer] gives you secure access to your electronic health record. If you see a primary care provider, you can also send messages to your care team and make appointments. If you have questions, please call your primary care clinic.  If you do not have a primary care provider, please call [phone] and they will assist you.        Care EveryWhere ID     This is your Care EveryWhere ID. This could be used by other organizations to access your [facility] medical records  [id number]           Review of your medicines      UNREVIEWED medicines. Ask your doctor about these medicines        Dose / Directions    acetaminophen 500 MG tablet   Commonly known as:  TYLENOL   Used for:  Closed fracture of one rib of right side, initial encounter        Dose:  1000 mg   Take 2 tablets (1,000 mg) by mouth every 6 hours as needed for pain   Quantity:  100 tablet   Refills:  0       aspirin 81 MG EC tablet   Commonly known as:  ASPIRIN LOW DOSE   Used for:  Coronary artery disease involving native heart with angina pectoris, unspecified vessel or lesion type (H)        Dose:  81 mg   Take 1 tablet (81 mg) by mouth daily   Quantity:  100 tablet   Refills:  4       atorvastatin 80 MG tablet   Commonly known as:  LIPITOR   Used for:  Hyperlipidemia LDL goal <100        Dose:  80 mg   Take 1 tablet (80 mg) by mouth daily   Quantity:  30 tablet   Refills:  2       benztropine 1 MG tablet   Commonly known as:  COGENTIN   Used for:  Tardive dyskinesia        Dose:  1 mg   Take 1 tablet (1 mg) by mouth 2 times daily   Quantity:  60 tablet   Refills:  3       cholecalciferol 69352 UNITS capsule   Commonly known as:  VITAMIN D3   Used for:  Vitamin D deficiency        Dose:  1 capsule   Take 1 capsule (50,000 Units) by mouth once a week FOR LOW VITAMIN D   Quantity:  7 capsule   Refills:  0       citalopram 20 MG tablet   Commonly known as:  celeXA    Used for:  Schizoaffective disorder, depressive type (H)        Dose:  20 mg   Take 1 tablet (20 mg) by mouth daily   Quantity:  30 tablet   Refills:  2       dulaglutide 1.5 MG/0.5ML pen   Commonly known as:  TRULICITY   Used for:  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, with long-term current use of insulin, unspecified laterality (H)        Dose:  1.5 mg   Inject 1.5 mg Subcutaneous every 7 days   Quantity:  2 mL   Refills:  1       gabapentin 300 MG capsule   Commonly known as:  NEURONTIN   Used for:  Type 2 diabetes mellitus with diabetic neuropathy, with long-term current use of insulin (H)        TAKE 2 CAPSULES (600MG) BY MOUTH THREE TIMES A DAY   Quantity:  168 capsule   Refills:  1       glucose 4 G Chew chewable tablet   Used for:  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, with long-term current use of insulin, unspecified laterality (H)        Take 2 every 15 minutes for blood sugar <70mg/dL. Recheck blood sugar every 15 minutes until above 70mg/dL, then eat a substantial meal.   Quantity:  20 tablet   Refills:  1       haloperidol 5 MG tablet   Commonly known as:  HALDOL   Used for:  Schizoaffective disorder, depressive type (H)        Dose:  5 mg   Take 1 tablet (5 mg) by mouth At Bedtime   Quantity:  30 tablet   Refills:  2       ibuprofen 600 MG tablet   Commonly known as:  ADVIL/MOTRIN   Used for:  Closed fracture of one rib of right side, initial encounter        Dose:  600 mg   Take 1 tablet (600 mg) by mouth every 4 hours as needed for moderate pain   Quantity:  60 tablet   Refills:  0       insulin aspart 100 UNIT/ML injection   Commonly known as:  NovoLOG PEN   Used for:  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, with long-term current use of insulin, unspecified laterality (H)        Dose:  20 Units   Inject 20 Units Subcutaneous 3 times daily (with meals)  Inject an extra 7 units once daily for blood sugars over 500mg/dL. Call the clinic if recheck is over 500mg/dL.   Quantity:  1 Month   Refills:  1       insulin glargine 100 UNIT/ML injection   Commonly known as:  LANTUS   Used for:  Type 2 diabetes mellitus with both eyes affected by mild nonproliferative retinopathy without macular edema, with long-term current use of insulin (H)        Dose:  45 Units   Inject 45 Units Subcutaneous At Bedtime   Quantity:  12 mL   Refills:  1       losartan 100 MG tablet   Commonly known as:  COZAAR   Used for:  Coronary artery disease involving native heart with angina pectoris, unspecified vessel or lesion type (H)        Dose:  100 mg   Take 1 tablet (100 mg) by mouth daily   Quantity:  30 tablet   Refills:  2       melatonin 5 MG Caps   Used for:  Insomnia, unspecified type        Dose:  1 capsule   Take 1 capsule by mouth daily At dinnertime   Quantity:  90 capsule   Refills:  1       metoprolol 100 MG 24 hr tablet   Commonly known as:  TOPROL-XL   Used for:  Coronary artery disease involving native heart with angina pectoris, unspecified vessel or lesion type (H)        TAKE 1 TABLET (100MG) BY MOUTH DAILY   Quantity:  30 tablet   Refills:  1       omeprazole 40 MG capsule   Commonly known as:  priLOSEC   Used for:  Gastroesophageal reflux disease without esophagitis        Dose:  40 mg   Take 1 capsule (40 mg) by mouth daily   Quantity:  30 capsule   Refills:  5       polyethylene glycol powder   Commonly known as:  MIRALAX   Used for:  Constipation, unspecified constipation type        Dose:  1 capful   Take 17 g (1 capful) by mouth daily   Quantity:  510 g   Refills:  1       senna-docusate 8.6-50 MG per tablet   Commonly known as:  SENOKOT-S;PERICOLACE   Used for:  Constipation, unspecified constipation type        Dose:  1 tablet   Take 1 tablet by mouth 2 times daily as needed for constipation   Quantity:  100 tablet   Refills:  1         CONTINUE these medicines which  have NOT CHANGED        Dose / Directions    blood glucose monitoring lancets   Used for:  Type 2 diabetes mellitus with diabetic neuropathy, with long-term current use of insulin (H)        Use to test blood sugar 2 times daily or as directed.  Ok to substitute alternative if insurance prefers.   Quantity:  1 Box   Refills:  prn       blood glucose monitoring test strip   Commonly known as:  ONE TOUCH ULTRA   Used for:  Type 2 diabetes mellitus with diabetic neuropathy, with long-term current use of insulin (H)        Check blood sugar 2 times a day as directed, One Touch Ultra Blue Test Strips Please match with Pt's Insurance and meter.   Quantity:  1 Box   Refills:  11       * order for DME   Used for:  Falls frequently        Equipment being ordered: Rolling walker   Quantity:  1 Device   Refills:  0       * order for DME   Used for:  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, with long-term current use of insulin, unspecified laterality (H)        Hold Novolog if meals are refused.   Quantity:  1 each   Refills:  0       * Notice:  This list has 2 medication(s) that are the same as other medications prescribed for you. Read the directions carefully, and ask your doctor or other care provider to review them with you.             Protect others around you: Learn how to safely use, store and throw away your medicines at www.disposemymeds.org.             Medication List: This is a list of all your medications and when to take them. Check marks below indicate your daily home schedule. Keep this list as a reference.      Medications           Morning Afternoon Evening Bedtime As Needed    acetaminophen 500 MG tablet   Commonly known as:  TYLENOL   Take 2 tablets (1,000 mg) by mouth every 6 hours as needed for pain                                aspirin 81 MG EC tablet   Commonly known as:  ASPIRIN LOW DOSE   Take 1 tablet (81 mg) by mouth daily                                 atorvastatin 80 MG tablet   Commonly known as:  LIPITOR   Take 1 tablet (80 mg) by mouth daily                                benztropine 1 MG tablet   Commonly known as:  COGENTIN   Take 1 tablet (1 mg) by mouth 2 times daily                                blood glucose monitoring lancets   Use to test blood sugar 2 times daily or as directed.  Ok to substitute alternative if insurance prefers.                                blood glucose monitoring test strip   Commonly known as:  ONE TOUCH ULTRA   Check blood sugar 2 times a day as directed, One Touch Ultra Blue Test Strips Please match with Pt's Insurance and meter.                                cholecalciferol 11441 UNITS capsule   Commonly known as:  VITAMIN D3   Take 1 capsule (50,000 Units) by mouth once a week FOR LOW VITAMIN D                                citalopram 20 MG tablet   Commonly known as:  celeXA   Take 1 tablet (20 mg) by mouth daily                                dulaglutide 1.5 MG/0.5ML pen   Commonly known as:  TRULICITY   Inject 1.5 mg Subcutaneous every 7 days                                gabapentin 300 MG capsule   Commonly known as:  NEURONTIN   TAKE 2 CAPSULES (600MG) BY MOUTH THREE TIMES A DAY                                glucose 4 G Chew chewable tablet   Take 2 every 15 minutes for blood sugar <70mg/dL. Recheck blood sugar every 15 minutes until above 70mg/dL, then eat a substantial meal.                                haloperidol 5 MG tablet   Commonly known as:  HALDOL   Take 1 tablet (5 mg) by mouth At Bedtime                                ibuprofen 600 MG tablet   Commonly known as:  ADVIL/MOTRIN   Take 1 tablet (600 mg) by mouth every 4 hours as needed for moderate pain                                insulin aspart 100 UNIT/ML injection   Commonly known as:  NovoLOG PEN   Inject 20 Units Subcutaneous 3 times daily (with meals) Inject an extra 7 units once daily for blood sugars over 500mg/dL. Call the clinic if  recheck is over 500mg/dL.                                insulin glargine 100 UNIT/ML injection   Commonly known as:  LANTUS   Inject 45 Units Subcutaneous At Bedtime                                losartan 100 MG tablet   Commonly known as:  COZAAR   Take 1 tablet (100 mg) by mouth daily                                melatonin 5 MG Caps   Take 1 capsule by mouth daily At dinnertime                                metoprolol 100 MG 24 hr tablet   Commonly known as:  TOPROL-XL   TAKE 1 TABLET (100MG) BY MOUTH DAILY                                omeprazole 40 MG capsule   Commonly known as:  priLOSEC   Take 1 capsule (40 mg) by mouth daily                                * order for DME   Equipment being ordered: Rolling walker                                * order for DME   Hold Novolog if meals are refused.                                polyethylene glycol powder   Commonly known as:  MIRALAX   Take 17 g (1 capful) by mouth daily                                senna-docusate 8.6-50 MG per tablet   Commonly known as:  SENOKOT-S;PERICOLACE   Take 1 tablet by mouth 2 times daily as needed for constipation                                * Notice:  This list has 2 medication(s) that are the same as other medications prescribed for you. Read the directions carefully, and ask your doctor or other care provider to review them with you.

## 2017-03-17 ENCOUNTER — TRANSFERRED RECORDS (OUTPATIENT)
Dept: HEALTH INFORMATION MANAGEMENT | Facility: CLINIC | Age: 57
End: 2017-03-17

## 2017-03-20 DIAGNOSIS — Z79.4 TYPE 2 DIABETES MELLITUS WITH MILD NONPROLIFERATIVE RETINOPATHY WITHOUT MACULAR EDEMA, WITH LONG-TERM CURRENT USE OF INSULIN, UNSPECIFIED LATERALITY (H): ICD-10-CM

## 2017-03-20 DIAGNOSIS — E11.3293 TYPE 2 DIABETES MELLITUS WITH BOTH EYES AFFECTED BY MILD NONPROLIFERATIVE RETINOPATHY WITHOUT MACULAR EDEMA, WITH LONG-TERM CURRENT USE OF INSULIN (H): ICD-10-CM

## 2017-03-20 DIAGNOSIS — Z79.4 TYPE 2 DIABETES MELLITUS WITH BOTH EYES AFFECTED BY MILD NONPROLIFERATIVE RETINOPATHY WITHOUT MACULAR EDEMA, WITH LONG-TERM CURRENT USE OF INSULIN (H): ICD-10-CM

## 2017-03-20 DIAGNOSIS — E11.3299 TYPE 2 DIABETES MELLITUS WITH MILD NONPROLIFERATIVE RETINOPATHY WITHOUT MACULAR EDEMA, WITH LONG-TERM CURRENT USE OF INSULIN, UNSPECIFIED LATERALITY (H): ICD-10-CM

## 2017-03-20 RX ORDER — FLURBIPROFEN SODIUM 0.3 MG/ML
SOLUTION/ DROPS OPHTHALMIC
Qty: 100 EACH | Refills: 11 | Status: ON HOLD | OUTPATIENT
Start: 2017-03-20 | End: 2018-01-12

## 2017-03-20 RX ORDER — INSULIN GLARGINE 100 [IU]/ML
INJECTION, SOLUTION SUBCUTANEOUS
Qty: 15 ML | Refills: 3 | Status: SHIPPED | OUTPATIENT
Start: 2017-03-20 | End: 2017-06-27

## 2017-03-20 NOTE — TELEPHONE ENCOUNTER
Rx refilled per RN protocol.      insulin aspart (NOVOLOG PEN) 100 UNIT/ML soln      Last Written Prescription Date: 11/30/16  Last Fill Quantity: 1 month, # refills: 1  Last Office Visit with G, P or [facility] prescribing provider:  3/3/17  Next 5 appointments (look out 90 days)     Apr 05, 2017  2:00 PM CDT   Return Visit with Usman Hodgson PA-C   [facility] ([facility])    [address] [place] [address]  [address]  [facility] [address] [phone]                   BP Readings from Last 3 Encounters:   03/16/17 163/89   03/09/17 115/77   03/08/17 97/57     Lab Results   Component Value Date    MICROL 23 07/13/2016     No results found for: MICROALBUMIN  Creatinine   Date Value Ref Range Status   01/24/2017 0.94 0.52 - 1.04 mg/dL Final   ]  GFR Estimate   Date Value Ref Range Status   01/24/2017 61 >60 mL/min/1.7m2 Final     Comment:     Non  GFR Calc   01/22/2017 90 >60 mL/min/1.7m2 Final     Comment:     Non  GFR Calc   12/29/2016 83 >60 mL/min/1.7m2 Final     Comment:     Non  GFR Calc     GFR Estimate If Black   Date Value Ref Range Status   01/24/2017 74 >60 mL/min/1.7m2 Final     Comment:      GFR Calc   01/22/2017 >90   GFR Calc   >60 mL/min/1.7m2 Final   12/29/2016 >90   GFR Calc   >60 mL/min/1.7m2 Final     Lab Results   Component Value Date    CHOL 147 07/14/2015     Lab Results   Component Value Date    HDL 39 07/14/2015     Lab Results   Component Value Date     07/13/2016    LDL 78 07/14/2015     Lab Results   Component Value Date    TRIG 151 07/14/2015     Lab Results   Component Value Date    CHOLHDLRATIO 3.8 07/14/2015     Lab Results   Component Value Date    AST 22 01/24/2017     Lab Results   Component Value Date    ALT 24 01/24/2017     Lab Results   Component Value Date    A1C 7.1 01/27/2017    A1C 9.7 11/11/2016    A1C 9.3  08/11/2016    A1C 9.8 05/23/2016    A1C 9.5 04/19/2016     Potassium   Date Value Ref Range Status   01/24/2017 4.6 3.4 - 5.3 mmol/L Final       Indra Line RN

## 2017-03-21 ENCOUNTER — HOSPITAL ENCOUNTER (OUTPATIENT)
Dept: BEHAVIORAL HEALTH | Facility: CLINIC | Age: 57
End: 2017-03-21
Attending: PSYCHIATRY & NEUROLOGY
Payer: MEDICARE

## 2017-03-21 PROCEDURE — H2012 BEHAV HLTH DAY TREAT, PER HR: HCPCS

## 2017-03-22 NOTE — PROGRESS NOTES
"Adult Mental Health Outpatient Group Therapy Progress Note       Client Initial Individualized Goals for Treatment:       \" Get help for my anxiety and depression.\"      See Initial Treatment suggestions for the client during the time between Diagnostic Assessment and completion of the Master Individualized Treatment Plan:      Treatment Goals:      1.Client will notify staff when needing assistance to develop or implement a coping plan to manage suicidal or self injurious urges.  2. When in life skills client will learn and practice 1-2 skills to help with better management of stress providing an update of weekly progress.  3. Will report on symptoms and identify skills to use to manage depression, anxiety levels, panic attacks, reduce and recognize Psychosis, and not respond to voices that tell her to harm herself.   4. Will develop an aftercare plan by scheduling an appointment and establishing care with a new psychiatrist.      Area of Treatment Focus:  Symptom Management      Therapeutic Interventions/Treatment Strategies:  Support, Safety Assessments, Problem Solving, Clarification and Education      Response to Treatment Strategies:  Accepted Feedback, Attentive and Alert      Name of Group: 4C Group Therapy       Description and Outcome:     Nena reported being safe today. The group discussed different areas of their lives, and how they are able to care for themselves, get out in the community, and their feelings for the past few days.   The following was reported:      Sleep: She didn't  use her C Pap machine, but plans to do so.   Medication: As prescribed  .  Concentration: ok      Appetite: Ok,  Interest level and activities: She went out to [employer] with her house-mates and they rode in a van from her residence.  She said she had a nice time. She reported that she talked to her relatives and sons.      Social contacts: Staff, house-mates, relatives, grandkids.  Feelings: Depression, grief and " loss. She was observed interacting with other group members.      Psychosis: She has a visual hallucination of a shadow man, and he comes at night.   She reported that she got a light and used it last night, and the Shadow Man did not appear.         She reported some problems with paranoia.      Skills used: She keeps appointments, rests, talking to others on the phone, takes care of herself, watches TV and listens to music to distract herself. She tells her staff about the things that she needs.    Mental Health Management:   The group participated in a structured activity that involved practicing deep breathing and focusing on relaxing scenes. The group discussed negative thoughts and the importance of observing them and letting them go. The group discussed grief and loss issues and how they have impacted their lives. The group wrote about memories from grief and loss issues and shared it with others.   Nena worked on her coping book and added grief and loss notes to it.              Is this a Weekly Review of the Progress on the Treatment Plan?  Yes.       Are Treatment Plan Goals being addressed?  Yes, continue treatment goals          Are Treatment Plan Strategies to Address Goals Effective?  Yes, continue treatment strategies          Are there any current contracts in place?  No

## 2017-03-22 NOTE — TELEPHONE ENCOUNTER
Phone call to [facility] to coordinate care with staff and Nena  And arrange for completion of the [employer] application and reminder for   Nena to talk to staff about the Sunday night Grief and Loss group at [facility]  At 6:30 pm.    America Donnelly, Liliya., D,  L.P.  [facility]ult Day [place]

## 2017-03-22 NOTE — PROGRESS NOTES
"Group Therapy Progress Notes   Client Initial Individualized Goals for Treatment:      \" Get help for my anxiety and depression.\"     See Initial Treatment suggestions for the client during the time between Diagnostic Assessment and completion of the Master Individualized Treatment Plan:     Treatment Goals:     1.Client will notify staff when needing assistance to develop or implement a coping plan to manage suicidal or self injurious urges.  2. When in life skills client will learn and practice 1-2 skills to help with better management of stress providing an update of weekly progress.  3. Will report on symptoms and identify skills to use to manage depression, anxiety levels, panic attacks, reduce and recognize Psychosis, and not respond to voices that tell her to harm herself.   4. Will develop an aftercare plan by scheduling an appointment and establishing care with a new psychiatrist    Area of Treatment Focus:  Symptom Management    Therapeutic Interventions/Treatment Strategies:  Support, Feedback, Safety Assessments, Clarification Structured Activity and Education    Response to Treatment Strategies:  Accepted Feedback, Listened, Attentive, Accepted Support and Alert    Name of Group:  Life Skills    Progress Note  Client presented with calm,even mood.Good focus and concentration during a discussion related to communication with a focus on conversation skills. Thought process clear,goal directed and reality based. Client appeared to understand the basic concepts of the discussion.        Is this a Weekly Review of the Progress on the Treatment Plan?  Yes.      Are Treatment Plan Goals being addressed?  Yes, continue treatment goals      Are Treatment Plan Strategies to Address Goals Effective?  Yes, continue treatment strategies      Are there any current contracts in place?  No         "

## 2017-03-23 ENCOUNTER — TELEPHONE (OUTPATIENT)
Dept: BEHAVIORAL HEALTH | Facility: CLINIC | Age: 57
End: 2017-03-23

## 2017-03-23 NOTE — TELEPHONE ENCOUNTER
Phone call to staff at [facility] to inform them that Nena signed up for [employer], and   That she wants to go to a Grief and Loss Support Group on Sundays 6:30 -8:00 at [facility] in  [place], and could they talk to her about this, and she needed help with transportation.  Coordination of care to arrange her discharge. She has an appointment with [facility] on 3/24/17.    America Donnelly, Liliya., D,  L.P.  Adult Day [place]

## 2017-03-24 ENCOUNTER — TRANSFERRED RECORDS (OUTPATIENT)
Dept: HEALTH INFORMATION MANAGEMENT | Facility: CLINIC | Age: 57
End: 2017-03-24

## 2017-03-27 ENCOUNTER — TELEPHONE (OUTPATIENT)
Dept: OPHTHALMOLOGY | Facility: CLINIC | Age: 57
End: 2017-03-27

## 2017-03-27 ENCOUNTER — TELEPHONE (OUTPATIENT)
Dept: BEHAVIORAL HEALTH | Facility: CLINIC | Age: 57
End: 2017-03-27

## 2017-03-27 DIAGNOSIS — Z79.4 TYPE 2 DIABETES MELLITUS WITH MILD NONPROLIFERATIVE RETINOPATHY WITHOUT MACULAR EDEMA, WITH LONG-TERM CURRENT USE OF INSULIN, UNSPECIFIED LATERALITY (H): ICD-10-CM

## 2017-03-27 DIAGNOSIS — E11.3299 TYPE 2 DIABETES MELLITUS WITH MILD NONPROLIFERATIVE RETINOPATHY WITHOUT MACULAR EDEMA, WITH LONG-TERM CURRENT USE OF INSULIN, UNSPECIFIED LATERALITY (H): ICD-10-CM

## 2017-03-27 NOTE — TELEPHONE ENCOUNTER
Fax received 03/24/17    dulaglutide (TRULICITY) 1.5 MG/0.5ML pen         Last Written Prescription Date: 02/03/17  Last Fill Quantity: 2ml, # refills: 1  Last Office Visit with G, P or [facility] prescribing provider:  03/03/17   Next 5 appointments (look out 90 days)     Apr 05, 2017  2:00 PM CDT   Return Visit with Usman Hodgson PA-C   [facility] ([facility])    [address]   [address]  [facility] [address]   [phone]                   BP Readings from Last 3 Encounters:   03/16/17 163/89   03/09/17 115/77   03/08/17 97/57     Lab Results   Component Value Date    MICROL 23 07/13/2016     No results found for: MICROALBUMIN  Creatinine   Date Value Ref Range Status   01/24/2017 0.94 0.52 - 1.04 mg/dL Final   ]  GFR Estimate   Date Value Ref Range Status   01/24/2017 61 >60 mL/min/1.7m2 Final     Comment:     Non  GFR Calc   01/22/2017 90 >60 mL/min/1.7m2 Final     Comment:     Non  GFR Calc   12/29/2016 83 >60 mL/min/1.7m2 Final     Comment:     Non  GFR Calc     GFR Estimate If Black   Date Value Ref Range Status   01/24/2017 74 >60 mL/min/1.7m2 Final     Comment:      GFR Calc   01/22/2017 >90   GFR Calc   >60 mL/min/1.7m2 Final   12/29/2016 >90   GFR Calc   >60 mL/min/1.7m2 Final     Lab Results   Component Value Date    CHOL 147 07/14/2015     Lab Results   Component Value Date    HDL 39 07/14/2015     Lab Results   Component Value Date     07/13/2016    LDL 78 07/14/2015     Lab Results   Component Value Date    TRIG 151 07/14/2015     Lab Results   Component Value Date    CHOLHDLRATIO 3.8 07/14/2015     Lab Results   Component Value Date    AST 22 01/24/2017     Lab Results   Component Value Date    ALT 24 01/24/2017     Lab Results   Component Value Date    A1C 7.1 01/27/2017    A1C 9.7 11/11/2016    A1C 9.3 08/11/2016     A1C 9.8 05/23/2016    A1C 9.5 04/19/2016     Potassium   Date Value Ref Range Status   01/24/2017 4.6 3.4 - 5.3 mmol/L Final

## 2017-03-27 NOTE — TELEPHONE ENCOUNTER
I spoke to Alva, the patient's care coordinator, who states she needs to schedule for patient to have a retinal evaluation before scheduling cataract surgery. She states that at the last retina visit the most current A1C was not used and an older number was used.   She notes that the patient's vision is worsening.  I spoke to Dr. Diaz and the patient may meet with the surgery scheduler if Dr. Chacon okays the cataract surgery.

## 2017-03-27 NOTE — TELEPHONE ENCOUNTER
Prescription approved per [facility] Refill Protocol.   Thanks! Aniya Baron RN

## 2017-03-27 NOTE — TELEPHONE ENCOUNTER
Phone call Nena, to check status, as she didn't show today.  A message was left at her phone.    America Donnelly, Psy., D,  L.P.  [facility] [place]

## 2017-03-28 DIAGNOSIS — Z79.4 TYPE 2 DIABETES MELLITUS WITH MILD NONPROLIFERATIVE RETINOPATHY WITHOUT MACULAR EDEMA, WITH LONG-TERM CURRENT USE OF INSULIN, UNSPECIFIED LATERALITY (H): ICD-10-CM

## 2017-03-28 DIAGNOSIS — E11.3299 TYPE 2 DIABETES MELLITUS WITH MILD NONPROLIFERATIVE RETINOPATHY WITHOUT MACULAR EDEMA, WITH LONG-TERM CURRENT USE OF INSULIN, UNSPECIFIED LATERALITY (H): ICD-10-CM

## 2017-03-28 LAB — HBA1C MFR BLD: 7.2 % (ref 4.3–6)

## 2017-03-28 PROCEDURE — 83036 HEMOGLOBIN GLYCOSYLATED A1C: CPT | Performed by: PHYSICIAN ASSISTANT

## 2017-03-28 PROCEDURE — 36415 COLL VENOUS BLD VENIPUNCTURE: CPT | Performed by: PHYSICIAN ASSISTANT

## 2017-03-30 ENCOUNTER — OFFICE VISIT (OUTPATIENT)
Dept: OPHTHALMOLOGY | Facility: CLINIC | Age: 57
End: 2017-03-30
Attending: OPHTHALMOLOGY
Payer: MEDICARE

## 2017-03-30 ENCOUNTER — TELEPHONE (OUTPATIENT)
Dept: BEHAVIORAL HEALTH | Facility: CLINIC | Age: 57
End: 2017-03-30

## 2017-03-30 DIAGNOSIS — H53.002 AMBLYOPIA, LEFT EYE: ICD-10-CM

## 2017-03-30 DIAGNOSIS — E11.3299 NPDR (NONPROLIFERATIVE DIABETIC RETINOPATHY) (H): Primary | ICD-10-CM

## 2017-03-30 DIAGNOSIS — H26.9 CATARACT OF BOTH EYES: ICD-10-CM

## 2017-03-30 DIAGNOSIS — E55.9 VITAMIN D DEFICIENCY: ICD-10-CM

## 2017-03-30 PROCEDURE — 92134 CPTRZ OPH DX IMG PST SGM RTA: CPT | Mod: ZF | Performed by: OPHTHALMOLOGY

## 2017-03-30 PROCEDURE — 99213 OFFICE O/P EST LOW 20 MIN: CPT | Mod: 25,ZF

## 2017-03-30 RX ORDER — CHOLECALCIFEROL (VITAMIN D3) 1250 MCG
50000 CAPSULE ORAL
Qty: 3 CAPSULE | Refills: 1 | Status: SHIPPED | OUTPATIENT
Start: 2017-03-30 | End: 2017-05-02

## 2017-03-30 ASSESSMENT — REFRACTION_WEARINGRX
OD_AXIS: 172
OS_AXIS: 085
OS_SPHERE: -10.25
OS_ADD: +2.50
OD_SPHERE: PLANO
OD_CYLINDER: +1.00
OD_ADD: +2.50
OS_CYLINDER: +2.50

## 2017-03-30 ASSESSMENT — VISUAL ACUITY
METHOD: SNELLEN - LINEAR
OD_SC: 20/40
OD_PH_SC: 20/30-1
OS_PH_SC: 20/200
OS_SC: 20/500

## 2017-03-30 ASSESSMENT — SLIT LAMP EXAM - LIDS
COMMENTS: NORMAL
COMMENTS: NORMAL

## 2017-03-30 ASSESSMENT — TONOMETRY
IOP_METHOD: TONOPEN
OS_IOP_MMHG: 17
OD_IOP_MMHG: 23

## 2017-03-30 ASSESSMENT — CUP TO DISC RATIO
OD_RATIO: 0.3
OS_RATIO: 0.3

## 2017-03-30 ASSESSMENT — EXTERNAL EXAM - LEFT EYE: OS_EXAM: NORMAL

## 2017-03-30 ASSESSMENT — EXTERNAL EXAM - RIGHT EYE: OD_EXAM: NORMAL

## 2017-03-30 NOTE — MR AVS SNAPSHOT
After Visit Summary   3/30/2017    Nena Tang    MRN: [id number]           Patient Information     Date Of Birth          1960        Visit Information        Provider Department      3/30/2017 10:30 AM [facility]urcio [facility]acon [facility]        Today's Diagnoses     NPDR (nonproliferative diabetic retinopathy) (H)    -  1    Amblyopia, left eye        Cataract of both eyes           Follow-ups after your visit        Follow-up notes from your care team     Return in about 6 months (around 9/30/2017) for NPDR OCT OU.      Your next 10 appointments already scheduled     Mar 30, 2017  3:30 PM CDT   Treatment with ADULT  DAY 4C   [facility] ([facility])    [address] [address] [phone]            Apr 05, 2017  2:00 PM CDT   Return Visit with Usman Hodgson PA-C   [facility] ([facility])    [address] [place] [address]  [address]  [facility] [address] [phone]            May 11, 2017  9:30 AM CDT   Return Sleep Patient with William Eng MD   [facility] ([facility])    [address] [place] [address]  [facility] [id number] [phone]            May 25, 2017 10:15 AM CDT   RETURN RETINA with Tiburcio Chacon MD   [facility] ([facility])    [address]  [address] [facility]  [address] [address]  [facility] [address]   [phone]              Future tests that were ordered for you today     Open Future Orders        Priority Expected Expires Ordered    OCT Retina Spectralis OU (both eyes) Routine  10/1/2018 3/30/2017            Who to contact     Please call your clinic at [phone] to:    Ask questions about your health    Make or cancel appointments    Discuss your medicines    Learn about your test results    Speak to your doctor    If you have compliments or concerns about an experience at your clinic, or if you wish to file a complaint, please contact [facility] Patient Relations at [phone] or email us at [email or web address]         Additional Information About Your Visit        MyChart Information     Georgina Goodmanhart gives you secure access to your electronic health record. If you see a primary care provider, you can also send messages to your care team and make appointments. If you have questions, please call your primary care clinic.  If you do not have a primary care provider, please call [phone] and they will assist you.      [employer] is an electronic gateway that provides easy, online access to your medical records. With [employer], you can request a clinic appointment, read your test results, renew a prescription or communicate with your care team.     To access your existing account, please contact your [facility] or call [phone] for assistance.        Care EveryWhere ID     This is your Care EveryWhere ID. This could be used by other organizations to access your [place] medical records  [id number]        Your Vitals Were     Last Period                   01/06/2015            Blood Pressure from Last 3 Encounters:   03/16/17 163/89   03/09/17 115/77   03/08/17 97/57    Weight from Last 3 Encounters:   03/16/17 99.8 kg (220 lb)   03/09/17 99.8 kg (220 lb)   03/08/17 102.8 kg (226 lb 9.6 oz)              We Performed the Following     OCT Retina Spectralis OU (both eyes)          Today's Medication Changes          These changes are accurate as of: 3/30/17 11:56 AM.  If you have any questions, ask your nurse or doctor.               These medicines have changed or have updated prescriptions.        Dose/Directions    aspirin 81 MG EC tablet   Commonly known as:  ASPIRIN LOW DOSE   This may have changed:  when to take this   Used for:  Coronary artery disease  involving native heart with angina pectoris, unspecified vessel or lesion type (H)        Dose:  81 mg   Take 1 tablet (81 mg) by mouth daily   Quantity:  100 tablet   Refills:  4       atorvastatin 80 MG tablet   Commonly known as:  LIPITOR   This may have changed:  when to take this   Used for:  Hyperlipidemia LDL goal <100        Dose:  80 mg   Take 1 tablet (80 mg) by mouth daily   Quantity:  30 tablet   Refills:  2       losartan 100 MG tablet   Commonly known as:  COZAAR   This may have changed:  when to take this   Used for:  Coronary artery disease involving native heart with angina pectoris, unspecified vessel or lesion type (H)        Dose:  100 mg   Take 1 tablet (100 mg) by mouth daily   Quantity:  30 tablet   Refills:  2       omeprazole 40 MG capsule   Commonly known as:  priLOSEC   This may have changed:  when to take this   Used for:  Gastroesophageal reflux disease without esophagitis        Dose:  40 mg   Take 1 capsule (40 mg) by mouth daily   Quantity:  30 capsule   Refills:  5       polyethylene glycol powder   Commonly known as:  MIRALAX   This may have changed:  when to take this   Used for:  Constipation, unspecified constipation type        Dose:  1 capful   Take 17 g (1 capful) by mouth daily   Quantity:  510 g   Refills:  1                Primary Care Provider    None Specified       No primary provider on file.        Goals        General    Medical (pt-stated)     Notes - Note created  7/7/2016  9:15 AM by Chelsea Pulido RN    Get blood sugars under control    Improve medication compliance    As of today's date 7/7/2016 goal is met at 0 - 25%.   Goal Status:  Active          Thank you!     Thank you for choosing [facility]  for your care. Our goal is always to provide you with excellent care. Hearing back from our patients is one way we can continue to improve our services. Please take a few minutes to complete the written survey that you may receive in the mail after your visit  with us. Thank you!             Your Updated Medication List - Protect others around you: Learn how to safely use, store and throw away your medicines at www.disposemymeds.org.          This list is accurate as of: 3/30/17 11:56 AM.  Always use your most recent med list.                   Brand Name Dispense Instructions for use    acetaminophen 500 MG tablet    TYLENOL    100 tablet    Take 2 tablets (1,000 mg) by mouth every 6 hours as needed for pain       aspirin 81 MG EC tablet    ASPIRIN LOW DOSE    100 tablet    Take 1 tablet (81 mg) by mouth daily       atorvastatin 80 MG tablet    LIPITOR    30 tablet    Take 1 tablet (80 mg) by mouth daily       benztropine 1 MG tablet    COGENTIN    60 tablet    Take 1 tablet (1 mg) by mouth 2 times daily       blood glucose monitoring lancets     1 Box    Use to test blood sugar 2 times daily or as directed.  Ok to substitute alternative if insurance prefers.       blood glucose monitoring test strip    ONE TOUCH ULTRA    1 Box    Check blood sugar 2 times a day as directed, One Touch Ultra Blue Test Strips Please match with Pt's Insurance and meter.       cholecalciferol 33203 UNITS capsule    VITAMIN D3    7 capsule    Take 1 capsule (50,000 Units) by mouth once a week FOR LOW VITAMIN D       citalopram 20 MG tablet    celeXA    30 tablet    Take 1 tablet (20 mg) by mouth daily       dulaglutide 1.5 MG/0.5ML pen    TRULICITY    2 mL    Inject 1.5 mg Subcutaneous every 7 days       gabapentin 300 MG capsule    NEURONTIN    168 capsule    TAKE 2 CAPSULES (600MG) BY MOUTH THREE TIMES A DAY       glucose 4 G Chew chewable tablet     20 tablet    Take 2 every 15 minutes for blood sugar <70mg/dL. Recheck blood sugar every 15 minutes until above 70mg/dL, then eat a substantial meal.       haloperidol 5 MG tablet    HALDOL    30 tablet    Take 1 tablet (5 mg) by mouth At Bedtime       ibuprofen 600 MG tablet    ADVIL/MOTRIN    60 tablet    Take 1 tablet (600 mg) by mouth every  4 hours as needed for moderate pain       insulin aspart 100 UNIT/ML injection    NovoLOG PEN    1 Month    Inject 20 Units Subcutaneous 3 times daily (with meals) Inject an extra 7 units once daily for blood sugars over 500mg/dL. Call the clinic if recheck is over 500mg/dL.       LANTUS SOLOSTAR 100 UNIT/ML injection   Generic drug:  insulin glargine     15 mL    INJECT 45 UNITS SUBCUTANEOUSLY EVERY NIGHT AT BEDTIME       losartan 100 MG tablet    COZAAR    30 tablet    Take 1 tablet (100 mg) by mouth daily       melatonin 5 MG Caps     90 capsule    Take 1 capsule by mouth daily At dinnertime       metoprolol 100 MG 24 hr tablet    TOPROL-XL    30 tablet    TAKE 1 TABLET (100MG) BY MOUTH DAILY       omeprazole 40 MG capsule    priLOSEC    30 capsule    Take 1 capsule (40 mg) by mouth daily       * order for DME     1 Device    Equipment being ordered: Rolling walker       * order for DME     1 each    Hold Novolog if meals are refused.       polyethylene glycol powder    MIRALAX    510 g    Take 17 g (1 capful) by mouth daily       senna-docusate 8.6-50 MG per tablet    SENOKOT-S;PERICOLACE    100 tablet    Take 1 tablet by mouth 2 times daily as needed for constipation       ULTICARE MINI 31G X 6 MM   Generic drug:  insulin pen needle     100 each    USE 4 DAILY OR AS DIRECTED       * Notice:  This list has 2 medication(s) that are the same as other medications prescribed for you. Read the directions carefully, and ask your doctor or other care provider to review them with you.

## 2017-03-30 NOTE — TELEPHONE ENCOUNTER
Phone call to Nena, who said that she was at an eye doctor appointment, and didn't know if she would make it in today.,    She said she would like to come one more time to say good-bye to the group.    America Donnelly, Liliya., D,  L.P.  [facility] [place]

## 2017-03-30 NOTE — NURSING NOTE
Chief Complaints and History of Present Illnesses   Patient presents with     Diabetic Retinopathy Follow Up     2 month follow up both eyes.     HPI    Affected eye(s):  Both   Symptoms:     No floaters   No flashes   No redness   No Dryness         Do you have eye pain now?:  No      Comments:  Pt states vision is the same as last visit. No eye pain today.  DMII BS: 194 this morning.  Lab Results       Component                Value               Date                       A1C                      7.2                 03/28/2017                 A1C                      7.1                 01/27/2017                 A1C                      9.7                 11/11/2016                 A1C                      9.3                 08/11/2016                 A1C                      9.8                 05/23/2016                Huy Day [facility] March 30, 2017 10:39 AM

## 2017-03-30 NOTE — PROGRESS NOTES
I have confirmed the patient's CC, HPI and reviewed Past Medical History, Past Surgical History, Social History, Family History, Problem List, Medication List and agree with Tech note.    CC: blurry vision     HPI: 57YO F Hx of DMII here for diabetic exam. DMII x 12 years. On oral and insulin. Last hgA1c 7.2% in 3/28/2017. Glucose under more control. Hoping to get cataract surgery with Dr. Diaz now.    OCT Macula 3/30/2017  OD: mild intra-retinal fluid temporal macula (stable), no subretinal fluid, exudates  OS: exudates, no intra-retinal fluid    Assessment/plan   1.  DMII: severe NPDR   - last HgA1c 7.2 3/2017 significantly improved (from 9 and 12 prior)!   - emphasized importance of good blood glucose/blood pressure control     2. Myopia OS with secondary amblyopia              Does not wear glasses normally     3.  Cataracts, Both Eyes   -Was previously scheduled for cataract surgery OS with Dr Diaz   -A1C now improved    -OK to proceed with cataract surgery    Return 6 months for OCT OU    Geronimo Dorsey MD PGY-3  Resident    ATTESTATION:  I have seen and examined the patient with Dr. Dorsey and agree with the findings in this note.    Tiburcio Martin MD PhD.  Professor & Chair.

## 2017-04-03 ENCOUNTER — HOSPITAL ENCOUNTER (OUTPATIENT)
Dept: BEHAVIORAL HEALTH | Facility: CLINIC | Age: 57
End: 2017-04-03
Attending: PSYCHOLOGIST
Payer: MEDICARE

## 2017-04-03 PROCEDURE — H2012 BEHAV HLTH DAY TREAT, PER HR: HCPCS

## 2017-04-03 NOTE — PROGRESS NOTES
"Adult Mental Health Outpatient Group Therapy Progress Note       Client Initial Individualized Goals for Treatment:       \" Get help for my anxiety and depression.\"      See Initial Treatment suggestions for the client during the time between Diagnostic Assessment and completion of the Master Individualized Treatment Plan:      Treatment Goals:      1.Client will notify staff when needing assistance to develop or implement a coping plan to manage suicidal or self injurious urges.  2. When in life skills client will learn and practice 1-2 skills to help with better management of stress providing an update of weekly progress.  3. Will report on symptoms and identify skills to use to manage depression, anxiety levels, panic attacks, reduce and recognize Psychosis, and not respond to voices that tell her to harm herself.   4. Will develop an aftercare plan by scheduling an appointment and establishing care with a new psychiatrist.      Area of Treatment Focus:  Symptom Management      Therapeutic Interventions/Treatment Strategies:  Support, Safety Assessments, Problem Solving, Clarification and Education      Response to Treatment Strategies:  Accepted Feedback, Attentive and Alert      Name of Group: 4C Group Therapy and Life skills      Description and Outcome:      Nena reported being safe today. The group discussed different areas of their lives, and how they are able to care for themselves, get out in the community, and their feelings for the past few days.   The following was reported:      Sleep: She said that her sleep was good.  Medication: As prescribed  .  Concentration: ok      Appetite: Ok,  Interest level and activities: She went out to a musical with her house-mates and they rode in a van from her residence. She said she had a nice time. She reported that she talked to her relatives and sons, and helped with a family emergency. She attended Confucianism and a Grief and Loss group.  She completed her intake " for [facility].      Social contacts: Staff, house-mates, relatives, grandkids.  Feelings: Depression, grief and loss. She reported feeling relieved that she started the Grief and Loss group      Psychosis: She has a visual hallucination of a shadow man, and he comes at night.   She reported that she got a light and used it last night, and the Shadow Man did not appear.          She reported some problems with paranoia.      Skills used: She keeps appointments, rests, talking to others on the phone, takes care of herself, watches TV and listens to music to distract herself. She tells her staff about the things that she needs.         Life Skills  The group participated in a structured activity that involved reading worksheets about thought distortions, a structured journal, and reading about how to complete it. The group discussed how to recognize patterns that are negative, and how to slow down the negative thoughts. The group discussed their situations, feelings, and things that they did to work through the problems, and related it to the structured journal technique.  The group participated in a 10 minute mindfulness activity and rated their anxiety levels before and after the exercise. The group discussed the thought distortion patterns that they noticed they had. They used the five senses to help focus during the exercise and used cheerios for the focus.          Is this a Weekly Review of the Progress on the Treatment Plan?  Yes.       Are Treatment Plan Goals being addressed?  Yes, continue treatment goals          Are Treatment Plan Strategies to Address Goals Effective?  Yes, continue treatment strategies          Are there any current contracts in place?  No

## 2017-04-04 ENCOUNTER — HOSPITAL ENCOUNTER (OUTPATIENT)
Dept: BEHAVIORAL HEALTH | Facility: CLINIC | Age: 57
End: 2017-04-04
Attending: PSYCHOLOGIST
Payer: MEDICARE

## 2017-04-04 PROCEDURE — H2012 BEHAV HLTH DAY TREAT, PER HR: HCPCS

## 2017-04-04 NOTE — PROGRESS NOTES
"Adult Mental Health Outpatient Group Therapy Progress Note       Client Initial Individualized Goals for Treatment:       \" Get help for my anxiety and depression.\"      See Initial Treatment suggestions for the client during the time between Diagnostic Assessment and completion of the Master Individualized Treatment Plan:      Treatment Goals:      1.Client will notify staff when needing assistance to develop or implement a coping plan to manage suicidal or self injurious urges.  2. When in life skills client will learn and practice 1-2 skills to help with better management of stress providing an update of weekly progress.  3. Will report on symptoms and identify skills to use to manage depression, anxiety levels, panic attacks, reduce and recognize Psychosis, and not respond to voices that tell her to harm herself.   4. Will develop an aftercare plan by scheduling an appointment and establishing care with a new psychiatrist.      Area of Treatment Focus:  Symptom Management      Therapeutic Interventions/Treatment Strategies:  Support, Safety Assessments, Problem Solving, Clarification and Education      Response to Treatment Strategies:  Accepted Feedback, Attentive and Alert      Name of Group: 4C Group Therapy and Mental Health Management      Description and Outcome:      Nena reported being safe today. The group discussed different areas of their lives, and how they are able to care for themselves, get out in the community, and their feelings for the past few days.   The following was reported:      Sleep: She said that her sleep was good.  Medication: As prescribed  .  Concentration: ok      Appetite: Ok,  Interest level and activities: She ate supper with her house-mates and watched some TV.  She went to the intake at [facility], and is going to start on Thursday.      Social contacts: Staff, house-mates, relatives, grandkids.  Feelings: Depression, grief and loss. She reported feeling relieved that she started " the Grief and Loss group, and that it has helped her mood.  She reported that they watched a movie about grief and loss and being mad at God.      Psychosis: She reported that the visual hallucination of a shadow man, who comes at night has decreased.  She reported that she got a light and used it last night, and the Shadow Man did not appear.          She reported some problems with paranoia.      Skills used: She keeps appointments, rests, talking to others on the phone, takes care of herself, watches TV and listens to music to distract herself. She tells her staff about the things that she needs.          Mental Health Management  The group participated in a structured activity that involved managing and coping with symptoms. The group read worksheets about difficult emotions, and how to manage them. The group discussed different ways that they had used to help manage them.  The group worked on small coping books to help remind them to cope with difficulties. They talked about Reality Checks and what they were in relation to psychosis. The group participated in a 10 minute mindfulness exercise and practiced deep breathing.           Is this a Weekly Review of the Progress on the Treatment Plan?  Yes.       Are Treatment Plan Goals being addressed?  Yes, continue treatment goals          Are Treatment Plan Strategies to Address Goals Effective?  Yes, continue treatment strategies          Are there any current contracts in place?  No

## 2017-04-05 ENCOUNTER — OFFICE VISIT (OUTPATIENT)
Dept: FAMILY MEDICINE | Facility: CLINIC | Age: 57
End: 2017-04-05
Payer: MEDICARE

## 2017-04-05 VITALS
TEMPERATURE: 97.9 F | DIASTOLIC BLOOD PRESSURE: 76 MMHG | SYSTOLIC BLOOD PRESSURE: 133 MMHG | OXYGEN SATURATION: 96 % | BODY MASS INDEX: 44.07 KG/M2 | WEIGHT: 224.5 LBS | HEART RATE: 82 BPM | HEIGHT: 60 IN | RESPIRATION RATE: 18 BRPM

## 2017-04-05 DIAGNOSIS — Z79.4 TYPE 2 DIABETES MELLITUS WITH MILD NONPROLIFERATIVE RETINOPATHY WITHOUT MACULAR EDEMA, WITH LONG-TERM CURRENT USE OF INSULIN, UNSPECIFIED LATERALITY (H): ICD-10-CM

## 2017-04-05 DIAGNOSIS — K31.84 GASTROPARESIS: Primary | ICD-10-CM

## 2017-04-05 DIAGNOSIS — E11.3299 TYPE 2 DIABETES MELLITUS WITH MILD NONPROLIFERATIVE RETINOPATHY WITHOUT MACULAR EDEMA, WITH LONG-TERM CURRENT USE OF INSULIN, UNSPECIFIED LATERALITY (H): ICD-10-CM

## 2017-04-05 PROCEDURE — 99213 OFFICE O/P EST LOW 20 MIN: CPT | Performed by: PHYSICIAN ASSISTANT

## 2017-04-05 NOTE — PROGRESS NOTES
SUBJECTIVE:                                                    Nena Tang is a 57 year old female who presents to clinic today with caretaker for the following health issues:      Follow up on Colonoscopy       Patient have not heard anything regarding the nutrition referral.        Problem list and histories reviewed & adjusted, as indicated.  Additional history: MR reviewed.  Colonoscopy WNL. Patient and caretaker note that bowel function has improved on Miralax.  Patient still says it feels like her meals are just sitting in the upper part of her gut, especially in the am.  Diabetes care reviewed.  Nutritional consultation is still pending.  They will call again for an appointment.       Reviewed and updated as needed this visit by clinical staff       Reviewed and updated as needed this visit by Provider         ROS:  Constitutional, HEENT, cardiovascular, pulmonary, gi and gu systems are negative, except as otherwise noted.    OBJECTIVE:                                                    /76  Pulse 82  Temp 97.9  F (36.6  C)  Resp 18  Ht 5' (1.524 m)  Wt 224 lb 8 oz (101.8 kg)  LMP 01/06/2015  SpO2 96%  BMI 43.84 kg/m2  Body mass index is 43.84 kg/(m^2).    Total visit time is 20 Minutes with > 15 Minutes spent in care and consultation regarding Results and medication review with follow up plan.      Diagnostic Test Results:  none      ASSESSMENT/PLAN:                                                    1. Gastroparesis  -Continue Miralax    2. 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, with long-term current use of insulin, unspecified laterality (H)  -Follow up in 1 month  -Follow up per Nutritionist.     Patient and caretaker amenable to this follow up plan.      Usman Hodgson PA-C  [facility]

## 2017-04-05 NOTE — MR AVS SNAPSHOT
After Visit Summary   4/5/2017    Nena Tang    MRN: [id number]           Patient Information     Date Of Birth          1960        Visit Information        Provider Department      4/5/2017 2:00 PM Usman Hodgson PA-C [facility]        Today's Diagnoses     Gastroparesis    -  1    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, with long-term current use of insulin, unspecified laterality (H)           Follow-ups after your visit        Your next 10 appointments already scheduled     Apr 12, 2017 10:00 AM CDT   RETURN GENERAL with Tyra Diaz MD   [facility] ([facility])    [address]  [address]  [facility] [address]   [phone]            May 03, 2017  1:30 PM CDT   Return Visit with Usman Hodgson PA-C   [facility]    [address] [place] [address] [address]  [address]  [facility] [address]   [phone]            May 11, 2017  9:30 AM CDT   Return Sleep Patient with William Eng MD   [facility] ([facility])    [address]  [facility] [address]   [phone]            May 25, 2017 10:15 AM CDT   RETURN RETINA with Tiburcio Chacon MD   [facility] ([facility])    [address]  [address]  [facility] [address]   [phone]              Who to contact     If you have questions or need follow up information about today's clinic visit or your schedule please contact [facility] directly at [phone].  Normal or non-critical lab and imaging results will be communicated to you by MyChart, letter or phone within 4 business days after the clinic has received the results. If you do not hear from us within 7 days, please contact the  clinic through [employer] or phone. If you have a critical or abnormal lab result, we will notify you by phone as soon as possible.  Submit refill requests through [employer] or call your pharmacy and they will forward the refill request to us. Please allow 3 business days for your refill to be completed.          Additional Information About Your Visit        [employer] Information     [employer] gives you secure access to your electronic health record. If you see a primary care provider, you can also send messages to your care team and make appointments. If you have questions, please call your primary care clinic.  If you do not have a primary care provider, please call [phone] and they will assist you.        Care EveryWhere ID     This is your Care EveryWhere ID. This could be used by other organizations to access your [facility] medical records  [id number]        Your Vitals Were     Pulse Temperature Respirations Height Last Period Pulse Oximetry    82 97.9  F (36.6  C) 18 5' (1.524 m) 01/06/2015 96%    BMI (Body Mass Index)                   43.84 kg/m2            Blood Pressure from Last 3 Encounters:   04/05/17 133/76   03/16/17 163/89   03/09/17 115/77    Weight from Last 3 Encounters:   04/05/17 224 lb 8 oz (101.8 kg)   03/16/17 220 lb (99.8 kg)   03/09/17 220 lb (99.8 kg)              Today, you had the following     No orders found for display         Today's Medication Changes          These changes are accurate as of: 4/5/17  2:47 PM.  If you have any questions, ask your nurse or doctor.               These medicines have changed or have updated prescriptions.        Dose/Directions    aspirin 81 MG EC tablet   Commonly known as:  ASPIRIN LOW DOSE   This may have changed:  when to take this   Used for:  Coronary artery disease involving native heart with angina pectoris, unspecified vessel or lesion type (H)        Dose:  81 mg   Take 1 tablet (81 mg) by mouth daily   Quantity:  100 tablet   Refills:  4        atorvastatin 80 MG tablet   Commonly known as:  LIPITOR   This may have changed:  when to take this   Used for:  Hyperlipidemia LDL goal <100        Dose:  80 mg   Take 1 tablet (80 mg) by mouth daily   Quantity:  30 tablet   Refills:  2       losartan 100 MG tablet   Commonly known as:  COZAAR   This may have changed:  when to take this   Used for:  Coronary artery disease involving native heart with angina pectoris, unspecified vessel or lesion type (H)        Dose:  100 mg   Take 1 tablet (100 mg) by mouth daily   Quantity:  30 tablet   Refills:  2       omeprazole 40 MG capsule   Commonly known as:  priLOSEC   This may have changed:  when to take this   Used for:  Gastroesophageal reflux disease without esophagitis        Dose:  40 mg   Take 1 capsule (40 mg) by mouth daily   Quantity:  30 capsule   Refills:  5       polyethylene glycol powder   Commonly known as:  MIRALAX   This may have changed:  when to take this   Used for:  Constipation, unspecified constipation type        Dose:  1 capful   Take 17 g (1 capful) by mouth daily   Quantity:  510 g   Refills:  1                Primary Care Provider    None Specified       No primary provider on file.        Goals        General    Medical (pt-stated)     Notes - Note created  7/7/2016  9:15 AM by Chelsea Pulido, RN    Get blood sugars under control    Improve medication compliance    As of today's date 7/7/2016 goal is met at 0 - 25%.   Goal Status:  Active          Thank you!     Thank you for choosing [facility]  for your care. Our goal is always to provide you with excellent care. Hearing back from our patients is one way we can continue to improve our services. Please take a few minutes to complete the written survey that you may receive in the mail after your visit with us. Thank you!             Your Updated Medication List - Protect others around you: Learn how to safely use, store and throw away your medicines at  www.disposemymeds.org.          This list is accurate as of: 4/5/17  2:47 PM.  Always use your most recent med list.                   Brand Name Dispense Instructions for use    acetaminophen 500 MG tablet    TYLENOL    100 tablet    Take 2 tablets (1,000 mg) by mouth every 6 hours as needed for pain       aspirin 81 MG EC tablet    ASPIRIN LOW DOSE    100 tablet    Take 1 tablet (81 mg) by mouth daily       atorvastatin 80 MG tablet    LIPITOR    30 tablet    Take 1 tablet (80 mg) by mouth daily       benztropine 1 MG tablet    COGENTIN    60 tablet    Take 1 tablet (1 mg) by mouth 2 times daily       blood glucose monitoring lancets     1 Box    Use to test blood sugar 2 times daily or as directed.  Ok to substitute alternative if insurance prefers.       blood glucose monitoring test strip    ONE TOUCH ULTRA    1 Box    Check blood sugar 2 times a day as directed, One Touch Ultra Blue Test Strips Please match with Pt's Insurance and meter.       citalopram 20 MG tablet    celeXA    30 tablet    Take 1 tablet (20 mg) by mouth daily       dulaglutide 1.5 MG/0.5ML pen    TRULICITY    2 mL    Inject 1.5 mg Subcutaneous every 7 days       gabapentin 300 MG capsule    NEURONTIN    168 capsule    TAKE 2 CAPSULES (600MG) BY MOUTH THREE TIMES A DAY       glucose 4 G Chew chewable tablet     20 tablet    Take 2 every 15 minutes for blood sugar <70mg/dL. Recheck blood sugar every 15 minutes until above 70mg/dL, then eat a substantial meal.       haloperidol 5 MG tablet    HALDOL    30 tablet    Take 1 tablet (5 mg) by mouth At Bedtime       ibuprofen 600 MG tablet    ADVIL/MOTRIN    60 tablet    Take 1 tablet (600 mg) by mouth every 4 hours as needed for moderate pain       insulin aspart 100 UNIT/ML injection    NovoLOG PEN    1 Month    Inject 20 Units Subcutaneous 3 times daily (with meals) Inject an extra 7 units once daily for blood sugars over 500mg/dL. Call the clinic if recheck is over 500mg/dL.       LANTUS  SOLOSTAR 100 UNIT/ML injection   Generic drug:  insulin glargine     15 mL    INJECT 45 UNITS SUBCUTANEOUSLY EVERY NIGHT AT BEDTIME       losartan 100 MG tablet    COZAAR    30 tablet    Take 1 tablet (100 mg) by mouth daily       melatonin 5 MG Caps     90 capsule    Take 1 capsule by mouth daily At dinnertime       metoprolol 100 MG 24 hr tablet    TOPROL-XL    30 tablet    TAKE 1 TABLET (100MG) BY MOUTH DAILY       omeprazole 40 MG capsule    priLOSEC    30 capsule    Take 1 capsule (40 mg) by mouth daily       * order for DME     1 Device    Equipment being ordered: Rolling walker       * order for DME     1 each    Hold Novolog if meals are refused.       polyethylene glycol powder    MIRALAX    510 g    Take 17 g (1 capful) by mouth daily       senna-docusate 8.6-50 MG per tablet    SENOKOT-S;PERICOLACE    100 tablet    Take 1 tablet by mouth 2 times daily as needed for constipation       ULTICARE MINI 31G X 6 MM   Generic drug:  insulin pen needle     100 each    USE 4 DAILY OR AS DIRECTED       vitamin D3 84059 UNITS capsule    CHOLECALCIFEROL    3 capsule    Take 1 capsule (50,000 Units) by mouth every 7 days       * Notice:  This list has 2 medication(s) that are the same as other medications prescribed for you. Read the directions carefully, and ask your doctor or other care provider to review them with you.

## 2017-04-05 NOTE — PROGRESS NOTES
"Group Therapy Progress Notes   Date: 4/04/17    Client Initial Individualized Goals for Treatment:      \" Get help for my anxiety and depression.\"     See Initial Treatment suggestions for the client during the time between Diagnostic Assessment and completion of the Master Individualized Treatment Plan:     Treatment Goals:     1.Client will notify staff when needing assistance to develop or implement a coping plan to manage suicidal or self injurious urges.  2. When in life skills client will learn and practice 1-2 skills to help with better management of stress providing an update of weekly progress.  3. Will report on symptoms and identify skills to use to manage depression, anxiety levels, panic attacks, reduce and recognize Psychosis, and not respond to voices that tell her to harm herself.   4. Will develop an aftercare plan by scheduling an appointment and establishing care with a new psychiatrist    Area of Treatment Focus:  Symptom Management    Therapeutic Interventions/Treatment Strategies:  Support, Feedback, Safety Assessments, Clarification Structured Activity and Education    Response to Treatment Strategies:  Accepted Feedback, Listened, Attentive, Accepted Support and Alert    Name of Group:  Life Skills    Progress Note  Ania attended and participated in a structured life skills group focused on education and exploration on self-regulation with a focus on internal and external sensory input for calming and alerting impact. Ania engaged in the facilitated process around the basic concepts presented. Verbalizes increased self-awareness through the activity where she tried out a variety of sensory experiences and wrote down her preferences into her self-support book. Thought process clear, and reality based, Ania was able to describe a sensory experience around cooking food for her late . She appeared to understand the basic concepts of the discussion, but due to not having her glasses needed " assist with the handout.       Is this a Weekly Review of the Progress on the Treatment Plan?  No.

## 2017-04-05 NOTE — NURSING NOTE
Chief Complaint   Patient presents with     RECHECK     colonoscopy       Initial /76  Pulse 82  Temp 97.9  F (36.6  C)  Resp 18  Ht 5' (1.524 m)  Wt 224 lb 8 oz (101.8 kg)  LMP 01/06/2015  SpO2 96%  BMI 43.84 kg/m2 Estimated body mass index is 43.84 kg/(m^2) as calculated from the following:    Height as of this encounter: 5' (1.524 m).    Weight as of this encounter: 224 lb 8 oz (101.8 kg).  Medication Reconciliation: complete   Fallon Solano MA

## 2017-04-10 DIAGNOSIS — K59.00 CONSTIPATION, UNSPECIFIED CONSTIPATION TYPE: ICD-10-CM

## 2017-04-10 DIAGNOSIS — E11.3299 TYPE 2 DIABETES MELLITUS WITH MILD NONPROLIFERATIVE RETINOPATHY WITHOUT MACULAR EDEMA, WITH LONG-TERM CURRENT USE OF INSULIN, UNSPECIFIED LATERALITY (H): ICD-10-CM

## 2017-04-10 DIAGNOSIS — Z79.4 TYPE 2 DIABETES MELLITUS WITH MILD NONPROLIFERATIVE RETINOPATHY WITHOUT MACULAR EDEMA, WITH LONG-TERM CURRENT USE OF INSULIN, UNSPECIFIED LATERALITY (H): ICD-10-CM

## 2017-04-10 NOTE — TELEPHONE ENCOUNTER
insulin aspart (NOVOLOG PEN) 100 UNIT/ML soln         Last Written Prescription Date: 11/30/2016  Last Fill Quantity: 1 month, # refills: 1  Last Office Visit with [facility], [facility] or [facility] prescribing provider:  4/5/2017   Next 5 appointments (look out 90 days)     May 03, 2017  1:30 PM CDT   Return Visit with Usman Hodgson PA-C   [facility] ([facility])    [address]  [address]  [facility] [address] [phone]                   BP Readings from Last 3 Encounters:   04/05/17 133/76   03/16/17 163/89   03/09/17 115/77     Lab Results   Component Value Date    MICROL 23 07/13/2016     Lab Results   Component Value Date    UMALCR 11.39 07/13/2016     Creatinine   Date Value Ref Range Status   01/24/2017 0.94 0.52 - 1.04 mg/dL Final   ]  GFR Estimate   Date Value Ref Range Status   01/24/2017 61 >60 mL/min/1.7m2 Final     Comment:     Non  GFR Calc   01/22/2017 90 >60 mL/min/1.7m2 Final     Comment:     Non  GFR Calc   12/29/2016 83 >60 mL/min/1.7m2 Final     Comment:     Non  GFR Calc     GFR Estimate If Black   Date Value Ref Range Status   01/24/2017 74 >60 mL/min/1.7m2 Final     Comment:      GFR Calc   01/22/2017 >90   GFR Calc   >60 mL/min/1.7m2 Final   12/29/2016 >90   GFR Calc   >60 mL/min/1.7m2 Final     Lab Results   Component Value Date    CHOL 147 07/14/2015     Lab Results   Component Value Date    HDL 39 07/14/2015     Lab Results   Component Value Date     07/13/2016    LDL 78 07/14/2015     Lab Results   Component Value Date    TRIG 151 07/14/2015     Lab Results   Component Value Date    CHOLHDLRATIO 3.8 07/14/2015     Lab Results   Component Value Date    AST 22 01/24/2017     Lab Results   Component Value Date    ALT 24 01/24/2017     Lab Results   Component Value Date    A1C 7.2 03/28/2017    A1C 7.1 01/27/2017    A1C 9.7  11/11/2016    A1C 9.3 08/11/2016    A1C 9.8 05/23/2016     Potassium   Date Value Ref Range Status   01/24/2017 4.6 3.4 - 5.3 mmol/L Final       Called [facility] per possible break in medication use - they only dispense to facility dispense amount prescribed      Sometimes pharmacies dispense entire boxes which may give patient's extra supply - attempted to call [facility] x 3 - no answer per lunch    Recall to [facility] - Last refill: 3/20/2017  From an rx written on 1/27/2017  Prescribing provider:  Moraima Da Silva      Routing refill request to provider for review/approval because:  Writer does not see the rx pharmacy has on file in our system   Writer observes gaps in prescriptions  - provider to review please                Gaye Umana RN

## 2017-04-11 DIAGNOSIS — Z79.4 TYPE 2 DIABETES MELLITUS WITH DIABETIC NEUROPATHY, WITH LONG-TERM CURRENT USE OF INSULIN (H): ICD-10-CM

## 2017-04-11 DIAGNOSIS — E11.40 TYPE 2 DIABETES MELLITUS WITH DIABETIC NEUROPATHY, WITH LONG-TERM CURRENT USE OF INSULIN (H): ICD-10-CM

## 2017-04-11 NOTE — TELEPHONE ENCOUNTER
blood glucose monitoring (ONE TOUCH ULTRA) test strip filled 4/11/16.          Larry Faarax  [facility]

## 2017-04-11 NOTE — TELEPHONE ENCOUNTER
Prescription approved per [facility] Refill Protocol. Sakshi Viera RN April 11, 2017 3:18 PM

## 2017-04-11 NOTE — TELEPHONE ENCOUNTER
Incoming call from [facility] requesting a refill on One Touch Ultra Blue Test Strips. Per group home staff, pt has only 2 test strips left. Writer also received a fax from [email or web address] [place], requesting a refill.     One Touch Ultra Blue Test Strip (new) 50         Last Written Prescription Date: unsure  Last Fill Quantity: unsure # refills: unsure   Last Office Visit with G, P or [facility] prescribing provider:  04/05/17   Next 5 appointments (look out 90 days)     May 03, 2017  1:30 PM CDT   Return Visit with Usman Hodgson PA-C   [facility] ([facility])    [address] [place] [address]  [address]  [facility] [id number] [phone]                   BP Readings from Last 3 Encounters:   04/05/17 133/76   03/16/17 163/89   03/09/17 115/77     Lab Results   Component Value Date    MICROL 23 07/13/2016     Lab Results   Component Value Date    UMALCR 11.39 07/13/2016     Creatinine   Date Value Ref Range Status   01/24/2017 0.94 0.52 - 1.04 mg/dL Final   ]  GFR Estimate   Date Value Ref Range Status   01/24/2017 61 >60 mL/min/1.7m2 Final     Comment:     Non  GFR Calc   01/22/2017 90 >60 mL/min/1.7m2 Final     Comment:     Non  GFR Calc   12/29/2016 83 >60 mL/min/1.7m2 Final     Comment:     Non  GFR Calc     GFR Estimate If Black   Date Value Ref Range Status   01/24/2017 74 >60 mL/min/1.7m2 Final     Comment:      GFR Calc   01/22/2017 >90   GFR Calc   >60 mL/min/1.7m2 Final   12/29/2016 >90   GFR Calc   >60 mL/min/1.7m2 Final     Lab Results   Component Value Date    CHOL 147 07/14/2015     Lab Results   Component Value Date    HDL 39 07/14/2015     Lab Results   Component Value Date     07/13/2016    LDL 78 07/14/2015     Lab Results   Component Value Date    TRIG 151 07/14/2015     Lab Results   Component Value Date    CHOLHDLRATIO 3.8  07/14/2015     Lab Results   Component Value Date    AST 22 01/24/2017     Lab Results   Component Value Date    ALT 24 01/24/2017     Lab Results   Component Value Date    A1C 7.2 03/28/2017    A1C 7.1 01/27/2017    A1C 9.7 11/11/2016    A1C 9.3 08/11/2016    A1C 9.8 05/23/2016     Potassium   Date Value Ref Range Status   01/24/2017 4.6 3.4 - 5.3 mmol/L Final

## 2017-04-12 ENCOUNTER — OFFICE VISIT (OUTPATIENT)
Dept: OPHTHALMOLOGY | Facility: CLINIC | Age: 57
End: 2017-04-12
Attending: OPHTHALMOLOGY
Payer: MEDICARE

## 2017-04-12 DIAGNOSIS — H26.9 CATARACTS, BOTH EYES: Primary | ICD-10-CM

## 2017-04-12 PROCEDURE — 99212 OFFICE O/P EST SF 10 MIN: CPT

## 2017-04-12 RX ORDER — POLYETHYLENE GLYCOL 3350 17 G/17G
POWDER, FOR SOLUTION ORAL
Qty: 527 G | Refills: 3 | Status: SHIPPED | OUTPATIENT
Start: 2017-04-12 | End: 2017-07-25

## 2017-04-12 RX ORDER — INSULIN ASPART 100 [IU]/ML
INJECTION, SOLUTION INTRAVENOUS; SUBCUTANEOUS
Qty: 15 ML | Refills: 3 | Status: SHIPPED | OUTPATIENT
Start: 2017-04-12 | End: 2017-07-25

## 2017-04-12 ASSESSMENT — VISUAL ACUITY
OD_SC+: -1
METHOD: SNELLEN - LINEAR
OD_SC: 20/30
OS_PH_SC: 20/200
OS_SC: 20/400

## 2017-04-12 ASSESSMENT — CUP TO DISC RATIO
OS_RATIO: 0.3
OD_RATIO: 0.3

## 2017-04-12 ASSESSMENT — CONF VISUAL FIELD
OS_NORMAL: 1
METHOD: COUNTING FINGERS
OD_NORMAL: 1

## 2017-04-12 ASSESSMENT — EXTERNAL EXAM - RIGHT EYE: OD_EXAM: NORMAL

## 2017-04-12 ASSESSMENT — SLIT LAMP EXAM - LIDS
COMMENTS: NORMAL
COMMENTS: NORMAL

## 2017-04-12 ASSESSMENT — TONOMETRY
OS_IOP_MMHG: 25
OD_IOP_MMHG: 25
IOP_METHOD: TONOPEN

## 2017-04-12 ASSESSMENT — EXTERNAL EXAM - LEFT EYE: OS_EXAM: NORMAL

## 2017-04-12 NOTE — MR AVS SNAPSHOT
After Visit Summary   4/12/2017    Nena Tang    MRN: [id number]           Patient Information     Date Of Birth          1960        Visit Information        Provider Department      4/12/2017 10:00 AM Tyra Diaz MD [facility]        Today's Diagnoses     Cataracts, both eyes    -  1       Follow-ups after your visit        Follow-up notes from your care team     Return for scheduled procedure.      Your next 10 appointments already scheduled     Apr 21, 2017  3:30 PM CDT   Return Visit with Usman Hodgson PA-C   [facility] ([facility])    [address]  [address]  [facility] [address]   [phone]            May 03, 2017  1:30 PM CDT   Return Visit with Usman Hodgson PA-C   [facility] ([facility])    [address]  [address]  [facility] [address]   [phone]            May 11, 2017  9:30 AM CDT   Return Sleep Patient with William Eng MD   [facility] ([facility])    [address]  [facility] [address]   [phone]            May 25, 2017 10:15 AM CDT   RETURN RETINA with Tiburcio Chacon MD   Eye Clinic ([facility])    [address] [address]  [facility] [address]   [phone]            Jun 05, 2017  2:00 PM CDT   Post-Op with Tyra Diaz MD   Eye Clinic ([facility])    [address]  [address]  [facility] [address]   [phone]              Who to contact     Please call your clinic at [phone] to:    Ask questions about your health    Make or cancel appointments    Discuss your medicines    Learn about your test results    Speak to your doctor   If you have compliments or concerns about an experience at your clinic, or if you wish to  file a complaint, please contact [facility] Patient Relations at [phone] or email us at [email or web address]         Additional Information About Your Visit        [employer] Information     [employer] gives you secure access to your electronic health record. If you see a primary care provider, you can also send messages to your care team and make appointments. If you have questions, please call your primary care clinic.  If you do not have a primary care provider, please call [phone] and they will assist you.      [employer] is an electronic gateway that provides easy, online access to your medical records. With [employer], you can request a clinic appointment, read your test results, renew a prescription or communicate with your care team.     To access your existing account, please contact your [facility] or call [phone] for assistance.        Care EveryWhere ID     This is your Care EveryWhere ID. This could be used by other organizations to access your [place] medical records  [id number]        Your Vitals Were     Last Period                   01/06/2015            Blood Pressure from Last 3 Encounters:   04/05/17 133/76   03/16/17 163/89   03/09/17 115/77    Weight from Last 3 Encounters:   04/05/17 101.8 kg (224 lb 8 oz)   03/16/17 99.8 kg (220 lb)   03/09/17 99.8 kg (220 lb)              We Performed the Following     Lin-Operative Worksheet          Today's Medication Changes          These changes are accurate as of: 4/12/17 12:26 PM.  If you have any questions, ask your nurse or doctor.               These medicines have changed or have updated prescriptions.        Dose/Directions    aspirin 81 MG EC tablet   Commonly known as:  ASPIRIN LOW DOSE   This may have changed:  when to take this   Used for:  Coronary artery disease involving native heart with angina pectoris, unspecified vessel or lesion type (H)        Dose:  81 mg    Take 1 tablet (81 mg) by mouth daily   Quantity:  100 tablet   Refills:  4       atorvastatin 80 MG tablet   Commonly known as:  LIPITOR   This may have changed:  when to take this   Used for:  Hyperlipidemia LDL goal <100        Dose:  80 mg   Take 1 tablet (80 mg) by mouth daily   Quantity:  30 tablet   Refills:  2       losartan 100 MG tablet   Commonly known as:  COZAAR   This may have changed:  when to take this   Used for:  Coronary artery disease involving native heart with angina pectoris, unspecified vessel or lesion type (H)        Dose:  100 mg   Take 1 tablet (100 mg) by mouth daily   Quantity:  30 tablet   Refills:  2       omeprazole 40 MG capsule   Commonly known as:  priLOSEC   This may have changed:  when to take this   Used for:  Gastroesophageal reflux disease without esophagitis        Dose:  40 mg   Take 1 capsule (40 mg) by mouth daily   Quantity:  30 capsule   Refills:  5                Primary Care Provider    None Specified       No primary provider on file.        Goals        General    Medical (pt-stated)     Notes - Note created  7/7/2016  9:15 AM by Chelsea Pulido RN    Get blood sugars under control    Improve medication compliance    As of today's date 7/7/2016 goal is met at 0 - 25%.   Goal Status:  Active          Thank you!     Thank you for choosing EYE CLINIC  for your care. Our goal is always to provide you with excellent care. Hearing back from our patients is one way we can continue to improve our services. Please take a few minutes to complete the written survey that you may receive in the mail after your visit with us. Thank you!             Your Updated Medication List - Protect others around you: Learn how to safely use, store and throw away your medicines at www.disposemymeds.org.          This list is accurate as of: 4/12/17 12:26 PM.  Always use your most recent med list.                   Brand Name Dispense Instructions for use    acetaminophen 500 MG tablet     TYLENOL    100 tablet    Take 2 tablets (1,000 mg) by mouth every 6 hours as needed for pain       aspirin 81 MG EC tablet    ASPIRIN LOW DOSE    100 tablet    Take 1 tablet (81 mg) by mouth daily       atorvastatin 80 MG tablet    LIPITOR    30 tablet    Take 1 tablet (80 mg) by mouth daily       benztropine 1 MG tablet    COGENTIN    60 tablet    Take 1 tablet (1 mg) by mouth 2 times daily       blood glucose monitoring lancets     1 Box    Use to test blood sugar 2 times daily or as directed.  Ok to substitute alternative if insurance prefers.       blood glucose monitoring test strip    ONE TOUCH ULTRA    1 Box    Check blood sugar 2 times a day as directed, One Touch Ultra Blue Test Strips Please match with Pt's Insurance and meter.       citalopram 20 MG tablet    celeXA    30 tablet    Take 1 tablet (20 mg) by mouth daily       dulaglutide 1.5 MG/0.5ML pen    TRULICITY    2 mL    Inject 1.5 mg Subcutaneous every 7 days       gabapentin 300 MG capsule    NEURONTIN    168 capsule    TAKE 2 CAPSULES (600MG) BY MOUTH THREE TIMES A DAY       glucose 4 G Chew chewable tablet     20 tablet    Take 2 every 15 minutes for blood sugar <70mg/dL. Recheck blood sugar every 15 minutes until above 70mg/dL, then eat a substantial meal.       haloperidol 5 MG tablet    HALDOL    30 tablet    Take 1 tablet (5 mg) by mouth At Bedtime       ibuprofen 600 MG tablet    ADVIL/MOTRIN    60 tablet    Take 1 tablet (600 mg) by mouth every 4 hours as needed for moderate pain       LANTUS SOLOSTAR 100 UNIT/ML injection   Generic drug:  insulin glargine     15 mL    INJECT 45 UNITS SUBCUTANEOUSLY EVERY NIGHT AT BEDTIME       losartan 100 MG tablet    COZAAR    30 tablet    Take 1 tablet (100 mg) by mouth daily       melatonin 5 MG Caps     90 capsule    Take 1 capsule by mouth daily At dinnertime       metoprolol 100 MG 24 hr tablet    TOPROL-XL    30 tablet    TAKE 1 TABLET (100MG) BY MOUTH DAILY       NovoLOG FLEXPEN 100 UNIT/ML  injection   Generic drug:  insulin aspart     15 mL    INJECT 20 UNITS SUBCUTANEOUSLY THREE TIMES A DAY (WITH MEALS), INJECT AN EXTRA 7 UNITS ONCE DAILY FOR BLOOD SUGAR OVER 500       omeprazole 40 MG capsule    priLOSEC    30 capsule    Take 1 capsule (40 mg) by mouth daily       * order for DME     1 Device    Equipment being ordered: Rolling walker       * order for DME     1 each    Hold Novolog if meals are refused.       polyethylene glycol powder    MIRALAX/GLYCOLAX    527 g    TAKE 17 GRAMS (1 CAPFUL) BY MOUTH DAILY       senna-docusate 8.6-50 MG per tablet    SENOKOT-S;PERICOLACE    100 tablet    Take 1 tablet by mouth 2 times daily as needed for constipation       ULTICARE MINI 31G X 6 MM   Generic drug:  insulin pen needle     100 each    USE 4 DAILY OR AS DIRECTED       vitamin D3 35146 UNITS capsule    CHOLECALCIFEROL    3 capsule    Take 1 capsule (50,000 Units) by mouth every 7 days       * Notice:  This list has 2 medication(s) that are the same as other medications prescribed for you. Read the directions carefully, and ask your doctor or other care provider to review them with you.

## 2017-04-12 NOTE — NURSING NOTE
Chief Complaints and History of Present Illnesses   Patient presents with     Follow Up For     DMII: severe NPDR     HPI    Affected eye(s):  Both   Symptoms:        Duration:  2 weeks   Frequency:  Constant       Do you have eye pain now?:  No      Comments:  Pt. States no change in VA BE.  No c/o comfort BE.  Pat Cisneros COT 10:24 AM April 12, 2017

## 2017-04-12 NOTE — PROGRESS NOTES
\HPI: Nena Tang is a 57 year old female who presents as a followup for cataract from Dr. Chacon. She had previously been scheduled for cataract surgery but her BGs were poorly controlled. Her last A1C was 7.2 on 3/28/17. She c/o gradual onset  blurry vision in the left eye. The vision is blurred both at near and distance. She wears glasses for reading, but this doesn't seem to help much with the left eye. She is not using any eye drops. She has a history of worse vision in the left eye from amblyopia, but there has definitely been a change from her baseline over time.     Does have history of type 2 diabetes mellitus x 13 years. Takes PO meds and insulin. Most recent A1c 7.2 in 3/28/17. Denies vision loss, eye pain, flashes, new floaters.    IOL calculations were obtain 7/2016.    POHx:  Mild NPDR, both eyes  Myopia, left eye with secondary amblyopia  No trauma/eye surgery  No flomax    Current Eye Medications:   None    Assessment & Plan   (H26.9) Cataracts, bilateral  (primary encounter diagnosis)  Comment: BCVA right eye of 20/30 and left eye 20/70 with high myopia/astigmatism. She is happy with her right eye vision, but finds the left eye is bothersome and limiting her activities. She has a history of amblyopia OS, but has been as good as 20/40 in the past with BCVA of 20/200 today.    Dilates to: 7.5 mm  Alpha blockers/Flomax: None  Trauma/Pseudoxfoliation: None  Fuchs dystrophy/guttae: None    Diabetes: YES, with retinopathy  Anticoagulation: None    Cyl: OD 0.32 @ 81; OS 3.83 @91 (Discussed toric, declined)    We discussed the risks and benefits of cataract surgery in the left eye, and informed consent was obtained.  Proceed with CE/IOL OS. Refractive amblyopia with high myopia and astigmatism left eye. Will aim for emmetropia to match right eye.      Surgical plan:  Topical  Post-op acular  (E11.321) Type 2 diabetes mellitus with mild nonproliferative diabetic retinopathy with macular edema  (H)  Comment: Diagnosed around 2004. On insulin and oral hyoglycemics. Following with Dr. Chacon.  Plan: Discussed the importance of tight blood glucose control in the prevention of diabetic retinopathy. Recommend yearly dilated eye exam.    (H53.022) Refractive amblyopia, left with (H52.12) Myopia, left eye  Comment: High myopia left eye with refractive amblyopia. Has refracted to 20/40 in the past, vision now limited by cataract to 20/200  Plan: See above    Return for scheduled procedure.      Jayson Villar MD  PGY2, Dept of Ophthalmology  Pager [phone]    Teaching statement:  Complete documentation of historical and exam elements from today's encounter can be found in the full encounter summary report (not reduplicated in this progress note). I personally obtained the chief complaint(s) and history of present illness.  I confirmed and edited as necessary the review of systems, past medical/surgical history, family history, social history, and examination findings as documented by others; and I examined the patient myself. I personally reviewed the relevant tests, images, and reports as documented above.     I formulated and edited as necessary the assessment and plan and discussed the findings and management plan with the patient and family.    Tyra Diaz MD  Comprehensive Ophthalmology & Ocular Pathology  Department of Ophthalmology and Visual Neurosciences  [email or web address]  Pager [phone]

## 2017-04-21 ENCOUNTER — OFFICE VISIT (OUTPATIENT)
Dept: FAMILY MEDICINE | Facility: CLINIC | Age: 57
End: 2017-04-21
Payer: MEDICARE

## 2017-04-21 VITALS
BODY MASS INDEX: 43.59 KG/M2 | HEIGHT: 60 IN | DIASTOLIC BLOOD PRESSURE: 58 MMHG | OXYGEN SATURATION: 96 % | TEMPERATURE: 98.9 F | RESPIRATION RATE: 14 BRPM | WEIGHT: 222 LBS | HEART RATE: 84 BPM | SYSTOLIC BLOOD PRESSURE: 102 MMHG

## 2017-04-21 DIAGNOSIS — H25.9 SENILE CATARACTS OF BOTH EYES: ICD-10-CM

## 2017-04-21 DIAGNOSIS — Z01.818 PREOP GENERAL PHYSICAL EXAM: Primary | ICD-10-CM

## 2017-04-21 PROCEDURE — 99214 OFFICE O/P EST MOD 30 MIN: CPT | Performed by: PHYSICIAN ASSISTANT

## 2017-04-21 NOTE — MR AVS SNAPSHOT
After Visit Summary   4/21/2017    Nena Tang    MRN: [id number]           Patient Information     Date Of Birth          1960        Visit Information        Provider Department      4/21/2017 3:30 PM Usman Hodgson PA-C [facility]        Today's Diagnoses     Preop general physical exam    -  1    Senile cataracts of both eyes          Care Instructions      Before Your Surgery      Call your surgeon if there is any change in your health. This includes signs of a cold or flu (such as a sore throat, runny nose, cough, rash or fever).    Do not smoke, drink alcohol or take over the counter medicine (unless your surgeon or primary care doctor tells you to) for the 24 hours before and after surgery.    If you take prescribed drugs: Follow your doctor s orders about which medicines to take and which to stop until after surgery.    Eating and drinking prior to surgery: follow the instructions from your surgeon    Take a shower or bath the night before surgery. Use the soap your surgeon gave you to gently clean your skin. If you do not have soap from your surgeon, use your regular soap. Do not shave or scrub the surgery site.  Wear clean pajamas and have clean sheets on your bed.         Follow-ups after your visit        Your next 10 appointments already scheduled     May 03, 2017  1:30 PM CDT   Return Visit with Usman Hodgson PA-C   [facility] ([facility])    [address]  [address]  [facility] [address]   [phone]            May 05, 2017   Procedure with Tyra Diaz MD   [facility] (--)    [address]  [facility] [address]   [phone]            May 18, 2017 10:30 AM CDT   Return Sleep Patient with William Eng MD   [facility] ([facility])    [address]  [facility] [address]   [phone]            May 25, 2017 10:15 AM CDT   RETURN RETINA with Tiburcio Chacon MD   Eye Clinic ([facility])    [address] [facility]  [address]  [place] [address]   [phone]            Jun 05, 2017  2:00 PM CDT   Post-Op with Tyra Diaz MD   [facility] Clinic ([facility])    [address]  [address] [facility]  [address]  [place] [address]   [phone]              Who to contact     If you have questions or need follow up information about today's clinic visit or your schedule please contact [facility] directly at [phone].  Normal or non-critical lab and imaging results will be communicated to you by MyChart, letter or phone within 4 business days after the clinic has received the results. If you do not hear from us within 7 days, please contact the clinic through [employer] or phone. If you have a critical or abnormal lab result, we will notify you by phone as soon as possible.  Submit refill requests through [employer] or call your pharmacy and they will forward the refill request to us. Please allow 3 business days for your refill to be completed.          Additional Information About Your Visit        [employer] Information     [employer] gives you secure access to your electronic health record. If you see a primary care provider, you can also send messages to your care team and make appointments. If you have questions, please call your primary care clinic.  If you do not have a primary care provider, please call [phone] and they will assist you.        Care EveryWhere ID     This is your Care EveryWhere ID. This could be used by other organizations to access your [place] medical records  [id number]        Your Vitals Were     Pulse Temperature Respirations Height Last Period Pulse Oximetry    84 98.9  F (37.2  C) (Oral) 14 5' (1.524 m) 01/06/2015 96%     BMI (Body Mass Index)                   43.36 kg/m2            Blood Pressure from Last 3 Encounters:   04/21/17 102/58   04/05/17 133/76   03/16/17 163/89    Weight from Last 3 Encounters:   04/21/17 222 lb (100.7 kg)   04/05/17 224 lb 8 oz (101.8 kg)   03/16/17 220 lb (99.8 kg)              Today, you had the following     No orders found for display         Today's Medication Changes          These changes are accurate as of: 4/21/17  3:45 PM.  If you have any questions, ask your nurse or doctor.               These medicines have changed or have updated prescriptions.        Dose/Directions    aspirin 81 MG EC tablet   Commonly known as:  ASPIRIN LOW DOSE   This may have changed:  when to take this   Used for:  Coronary artery disease involving native heart with angina pectoris, unspecified vessel or lesion type (H)        Dose:  81 mg   Take 1 tablet (81 mg) by mouth daily   Quantity:  100 tablet   Refills:  4       atorvastatin 80 MG tablet   Commonly known as:  LIPITOR   This may have changed:  when to take this   Used for:  Hyperlipidemia LDL goal <100        Dose:  80 mg   Take 1 tablet (80 mg) by mouth daily   Quantity:  30 tablet   Refills:  2       losartan 100 MG tablet   Commonly known as:  COZAAR   This may have changed:  when to take this   Used for:  Coronary artery disease involving native heart with angina pectoris, unspecified vessel or lesion type (H)        Dose:  100 mg   Take 1 tablet (100 mg) by mouth daily   Quantity:  30 tablet   Refills:  2       omeprazole 40 MG capsule   Commonly known as:  priLOSEC   This may have changed:  when to take this   Used for:  Gastroesophageal reflux disease without esophagitis        Dose:  40 mg   Take 1 capsule (40 mg) by mouth daily   Quantity:  30 capsule   Refills:  5                Primary Care Provider    None Specified       No primary provider on file.        Goals        General    Medical (pt-stated)     Notes - Note created  7/7/2016  9:15 AM by  Chelsea Pulido, RN    Get blood sugars under control    Improve medication compliance    As of today's date 7/7/2016 goal is met at 0 - 25%.   Goal Status:  Active          Thank you!     Thank you for choosing [facility]  for your care. Our goal is always to provide you with excellent care. Hearing back from our patients is one way we can continue to improve our services. Please take a few minutes to complete the written survey that you may receive in the mail after your visit with us. Thank you!             Your Updated Medication List - Protect others around you: Learn how to safely use, store and throw away your medicines at www.disposemymeds.org.          This list is accurate as of: 4/21/17  3:45 PM.  Always use your most recent med list.                   Brand Name Dispense Instructions for use    acetaminophen 500 MG tablet    TYLENOL    100 tablet    Take 2 tablets (1,000 mg) by mouth every 6 hours as needed for pain       aspirin 81 MG EC tablet    ASPIRIN LOW DOSE    100 tablet    Take 1 tablet (81 mg) by mouth daily       atorvastatin 80 MG tablet    LIPITOR    30 tablet    Take 1 tablet (80 mg) by mouth daily       benztropine 1 MG tablet    COGENTIN    60 tablet    Take 1 tablet (1 mg) by mouth 2 times daily       blood glucose monitoring lancets     1 Box    Use to test blood sugar 2 times daily or as directed.  Ok to substitute alternative if insurance prefers.       citalopram 20 MG tablet    celeXA    30 tablet    Take 1 tablet (20 mg) by mouth daily       dulaglutide 1.5 MG/0.5ML pen    TRULICITY    2 mL    Inject 1.5 mg Subcutaneous every 7 days       gabapentin 300 MG capsule    NEURONTIN    168 capsule    TAKE 2 CAPSULES (600MG) BY MOUTH THREE TIMES A DAY       glucose 4 G Chew chewable tablet     20 tablet    Take 2 every 15 minutes for blood sugar <70mg/dL. Recheck blood sugar every 15 minutes until above 70mg/dL, then eat a substantial meal.        haloperidol 5 MG tablet    HALDOL    30 tablet    Take 1 tablet (5 mg) by mouth At Bedtime       ibuprofen 600 MG tablet    ADVIL/MOTRIN    60 tablet    Take 1 tablet (600 mg) by mouth every 4 hours as needed for moderate pain       LANTUS SOLOSTAR 100 UNIT/ML injection   Generic drug:  insulin glargine     15 mL    INJECT 45 UNITS SUBCUTANEOUSLY EVERY NIGHT AT BEDTIME       losartan 100 MG tablet    COZAAR    30 tablet    Take 1 tablet (100 mg) by mouth daily       melatonin 5 MG Caps     90 capsule    Take 1 capsule by mouth daily At dinnertime       metoprolol 100 MG 24 hr tablet    TOPROL-XL    30 tablet    TAKE 1 TABLET (100MG) BY MOUTH DAILY       NovoLOG FLEXPEN 100 UNIT/ML injection   Generic drug:  insulin aspart     15 mL    INJECT 20 UNITS SUBCUTANEOUSLY THREE TIMES A DAY (WITH MEALS), INJECT AN EXTRA 7 UNITS ONCE DAILY FOR BLOOD SUGAR OVER 500       omeprazole 40 MG capsule    priLOSEC    30 capsule    Take 1 capsule (40 mg) by mouth daily       ONE TOUCH ULTRA test strip   Generic drug:  blood glucose monitoring     150 strip    TEST TWICE DAILY AS DIRECTED.       * order for DME     1 Device    Equipment being ordered: Rolling walker       * order for DME     1 each    Hold Novolog if meals are refused.       polyethylene glycol powder    MIRALAX/GLYCOLAX    527 g    TAKE 17 GRAMS (1 CAPFUL) BY MOUTH DAILY       senna-docusate 8.6-50 MG per tablet    SENOKOT-S;PERICOLACE    100 tablet    Take 1 tablet by mouth 2 times daily as needed for constipation       ULTICARE MINI 31G X 6 MM   Generic drug:  insulin pen needle     100 each    USE 4 DAILY OR AS DIRECTED       vitamin D3 64911 UNITS capsule    CHOLECALCIFEROL    3 capsule    Take 1 capsule (50,000 Units) by mouth every 7 days       * Notice:  This list has 2 medication(s) that are the same as other medications prescribed for you. Read the directions carefully, and ask your doctor or other care provider to review them with you.

## 2017-04-21 NOTE — PROGRESS NOTES
[facility]  [address]  [address]  [facility] [address]  [phone]  Dept: [phone]    PRE-OP EVALUATION:  Today's date: 2017    Nena Tang (: 1960) presents for pre-operative evaluation assessment as requested by Dr. Diaz.  She requires evaluation and anesthesia risk assessment prior to undergoing surgery/procedure for treatment of Cataracts removal of both eyes .  Proposed procedure: Phacoemulsification Clear Cornea with Standard Intraocular Lens Implant both eyes    Date of Surgery/ Procedure:   Time of Surgery/ Procedure: 10:30 AM  Hospital/Surgical Facility: [facility]  Primary Physician: No primary care provider on file.  Type of Anesthesia Anticipated: to be determined    Patient has a Health Care Directive or Living Will:  NO    1. NO - Do you have a history of heart attack, stroke, stent, bypass or surgery on an artery in the head, neck, heart or legs?  2. NO - Do you ever have any pain or discomfort in your chest?  3. NO - DO YOU HAVE A HISTORY OF HEART FAILURE   3. NO - Do you have a history of  Heart Failure?  4. YES - ARE YOUR TROUBLED BY SHORTNESS OF BREATH WHEN WALKING ON THE LEVEL, UP A SLIGHT HILL OR AT NIGHT?   4. NO - Are you troubled by shortness of breath when: walking on the level, up a slight hill or at night?  5. NO - Do you currently have a cold, bronchitis or other respiratory infection?  6. NO - Do you have a cough, shortness of breath or wheezing?  7. NO - Do you sometimes get pains in the calves of your legs when you walk?  8. NO - Do you or anyone in your family have previous history of blood clots?  9. NO - Do you or does anyone in your family have a serious bleeding problem such as prolonged bleeding following surgeries or cuts?  10. NO - Have you ever had problems with anemia or been told to take iron pills?  11. NO - Have you had any abnormal blood loss such as black, tarry or bloody stools, or abnormal vaginal  bleeding?  12. NO - Have you ever had a blood transfusion?  13. NO - Have you or any of your relatives ever had problems with anesthesia?  14. YES- Do you have sleep apnea, excessive snoring or daytime drowsiness?  15. NO - Do you have any prosthetic heart valves?  16. NO - Do you have prosthetic joints?  17. NO - Is there any chance that you may be pregnant?      HPI:                                                      Brief HPI related to upcoming procedure: Decreased vision over time warranting lens replacement.       See problem list for active medical problems.  Problems all longstanding and stable, except as noted/documented.  See ROS for pertinent symptoms related to these conditions.                                                                                                  .    MEDICAL HISTORY:                                                      Patient Active Problem List    Diagnosis Date Noted     CAD (coronary artery disease) 02/29/2012     Priority: High      Tako-Tsubo stress cardiomyopathy 2009. Mild coronary artery disease,        Moderate major depression (H) 06/08/2011     Priority: High     Psychophysiological insomnia 03/09/2017     Priority: Medium     Depression 12/28/2016     Priority: Medium     Schizoaffective disorder (H) 12/20/2016     Priority: Medium     History of uterine cancer 12/07/2016     Priority: Medium     Yearly pap's per the provider office visit note on 12/07/16.       Falls frequently 08/18/2016     Priority: Medium     Left cataract 07/25/2016     Priority: Medium     Alcohol use 04/19/2016     Priority: Medium     Tardive dyskinesia 09/11/2015     Priority: Medium     Mild nonproliferative diabetic retinopathy (H) 06/19/2014     Priority: Medium     Problem list name updated by automated process. Provider to review       Esophageal reflux 06/09/2014     Priority: Medium     Suicidal ideation 05/01/2014     Priority: Medium     Cocaine abuse, episodic 10/03/2013      Priority: Medium     Lumbago 09/20/2013     Priority: Medium     Cervicalgia 09/20/2013     Priority: Medium     Health Care Home 08/16/2013     Priority: Medium     EMERGENCY CARE PLAN  Presenting Problem Signs and Symptoms Treatment Plan    Questions or concerns during clinic hours    I will call the clinic directly     Questions or concerns outside clinic hours    I will call the 24 hour nurse line at [phone]    Patient needs to schedule an appointment    I will call the 24 hour scheduling team at [phone] or clinic directly    Same day treatment     I will call the clinic first, nurse line if after hours, urgent care and express care if needed     Primary Care Provider Dr. Honorio Dias [facility] [phone]  Nurse Care Coordinator Idalmis Nettles [phone]        HTN, goal below 140/90 07/29/2013     Priority: Medium     Migraine headache 04/22/2013     Priority: Medium     Schizoaffective disorder, depressive type (H) 02/25/2013     Priority: Medium     Chronic low back pain 01/22/2013     Priority: Medium     Verbal auditory hallucination 10/04/2012     Priority: Medium     CINDY (obstructive sleep apnea)- mild AHI 10.3 03/08/2012     Priority: Medium     Sleep study 3/12 (198#)-   AHI 10.3, with significant desaturations down to 74%. RDI 10.3. Periodic Limb Movement Index 3.2/hour.         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 Echo 2010, angio 2011 with normal LVF.        Vitamin B12 deficiency without anemia 11/23/2009     Priority: Low      Diagnosis updated by automated process. Provider to review and confirm.       Osteopenia 10/07/2009     Priority: Low     Dexa 2009- Lumbar Spine (L1-L4): T-score -1.8, Left Femoral Neck: T-score -1.0, Right Femoral Neck: T-score -1.0             Past Medical History:   Diagnosis Date     CAD (coronary artery disease)     5/2014 cath, nonbostructive stenosis to LAD, RCA.     Chronic low back pain 1/22/2013     Cocaine abuse, in remission      Fecal urgency 3/8/2012     History of heroin abuse      Hyperlipidemia LDL goal <100 10/31/2010     Hypertension 7/29/2013     Illiterate 8/30/2011     Irritable bowel syndrome      Migraine 4/19/2012     Migraine headache 4/22/2013     Moderate major depression (H) 6/8/2011     Noncompliance with medication regimen 6/8/2011     Obesity      CINDY (obstructive sleep apnea) 3/8/2012    uses CPAP     Osteopenia 10/7/2009     Schizoaffective disorder, depressive type (H) 2/25/2013     Suicidal intent 10/2/2013     Takotsubo cardiomyopathy      Type 2 diabetes mellitus (H) 8/30/2011     Uterine cancer (H) 1983     Verbal auditory hallucination 10/4/2012     Past Surgical History:   Procedure Laterality Date     C OOPHORECTORMY FOR MALIG, W/BX  1983    UTERINE     COLONOSCOPY N/A 3/16/2017    Procedure: COLONOSCOPY;  Surgeon: Traci Gonzalez MD;  Location:  GI     Coronary CTA  5/21/2014     HYSTERECTOMY  1983    uterine cancer yearly pap's per provider.     LAPAROSCOPIC CHOLECYSTECTOMY  2008     RELEASE TRIGGER FINGER  10/11/2012    Left thumb. Procedure: RELEASE TRIGGER FINGER;  LEFT THUMB TRIGGER RELEASE;  Surgeon: Tay Langley MD;  Location: [facility]     RELEASE TRIGGER FINGER Right 12/26/2016    Procedure: RELEASE TRIGGER FINGER;  Surgeon: Albino Castañeda MD;  Location: [facility] OR     Current Outpatient Prescriptions   Medication Sig Dispense Refill     ONE TOUCH ULTRA test strip TEST TWICE DAILY AS DIRECTED. 150 strip 11     polyethylene glycol  (MIRALAX/GLYCOLAX) powder TAKE 17 GRAMS (1 CAPFUL) BY MOUTH DAILY 527 g 3     NOVOLOG FLEXPEN 100 UNIT/ML soln INJECT 20 UNITS SUBCUTANEOUSLY THREE TIMES A DAY (WITH MEALS), INJECT AN EXTRA 7 UNITS ONCE DAILY FOR BLOOD SUGAR OVER 500 15 mL 3     vitamin D3 (CHOLECALCIFEROL) 04920 UNITS capsule Take 1 capsule (50,000 Units) by mouth every 7 days 3 capsule 1     dulaglutide (TRULICITY) 1.5 MG/0.5ML pen Inject 1.5 mg Subcutaneous every 7 days 2 mL 1     ULTICARE MINI 31G X 6 MM insulin pen needle USE 4 DAILY OR AS DIRECTED 100 each 11     LANTUS SOLOSTAR 100 UNIT/ML soln INJECT 45 UNITS SUBCUTANEOUSLY EVERY NIGHT AT BEDTIME 15 mL 3     gabapentin (NEURONTIN) 300 MG capsule TAKE 2 CAPSULES (600MG) BY MOUTH THREE TIMES A  capsule 1     metoprolol (TOPROL-XL) 100 MG 24 hr tablet TAKE 1 TABLET (100MG) BY MOUTH DAILY 30 tablet 1     aspirin (ASPIRIN LOW DOSE) 81 MG EC tablet Take 1 tablet (81 mg) by mouth daily (Patient taking differently: Take 81 mg by mouth every morning ) 100 tablet 4     omeprazole (PRILOSEC) 40 MG capsule Take 1 capsule (40 mg) by mouth daily (Patient taking differently: Take 40 mg by mouth every morning ) 30 capsule 5     atorvastatin (LIPITOR) 80 MG tablet Take 1 tablet (80 mg) by mouth daily (Patient taking differently: Take 80 mg by mouth every morning ) 30 tablet 2     losartan (COZAAR) 100 MG tablet Take 1 tablet (100 mg) by mouth daily (Patient taking differently: Take 100 mg by mouth every morning ) 30 tablet 2     ibuprofen (ADVIL,MOTRIN) 600 MG tablet Take 1 tablet (600 mg) by mouth every 4 hours as needed for moderate pain 60 tablet 0     melatonin 5 MG CAPS Take 1 capsule by mouth daily At dinnertime 90 capsule 1     glucose 4 G CHEW Take 2 every 15 minutes for blood sugar <70mg/dL. Recheck blood sugar every 15 minutes until above 70mg/dL, then eat a substantial meal. 20 tablet 1     order for DME Hold Novolog if meals are refused. 1 each 0     blood glucose monitoring (ONE TOUCH  DELICA) lancets Use to test blood sugar 2 times daily or as directed.  Ok to substitute alternative if insurance prefers. 1 Box prn     citalopram (CELEXA) 20 MG tablet Take 1 tablet (20 mg) by mouth daily 30 tablet 2     haloperidol (HALDOL) 5 MG tablet Take 1 tablet (5 mg) by mouth At Bedtime 30 tablet 2     benztropine (COGENTIN) 1 MG tablet Take 1 tablet (1 mg) by mouth 2 times daily 60 tablet 3     order for DME Equipment being ordered: Rolling walker 1 Device 0     senna-docusate (SENOKOT-S;PERICOLACE) 8.6-50 MG per tablet Take 1 tablet by mouth 2 times daily as needed for constipation 100 tablet 1     acetaminophen (TYLENOL) 500 MG tablet Take 2 tablets (1,000 mg) by mouth every 6 hours as needed for pain (Patient not taking: Reported on 4/21/2017) 100 tablet 0     OTC products: None, except as noted above    Allergies   Allergen Reactions     Imidazole Antifungals Hives     Tolerates diflucan     Ketoprofen Itching     Pruritis to topical     Lisinopril Hives     Metformin Other (See Comments)     Patient hospitalized for lactic acidosis - admitting provider suspectd caused by metformin     Metronidazole Hives      Latex Allergy: NO    Social History   Substance Use Topics     Smoking status: Never Smoker     Smokeless tobacco: Never Used     Alcohol use No      Comment: last month     History   Drug Use No     Comment: history of       REVIEW OF SYSTEMS:                                                    C: NEGATIVE for fever, chills, change in weight  E/M: NEGATIVE for ear, mouth and throat problems  R: NEGATIVE for significant cough or SOB  CV: NEGATIVE for chest pain, palpitations or peripheral edema    EXAM:                                                    /58  Pulse 84  Temp 98.9  F (37.2  C) (Oral)  Resp 14  Ht 5' (1.524 m)  Wt 222 lb (100.7 kg)  LMP 01/06/2015  SpO2 96%  BMI 43.36 kg/m2  GENERAL APPEARANCE: healthy, alert and no distress  HENT: ear canals and TM's normal and nose and  mouth without ulcers or lesions  RESP: lungs clear to auscultation - no rales, rhonchi or wheezes  CV: regular rate and rhythm, normal S1 S2, no S3 or S4 and no murmur, click or rub   ABDOMEN: soft, nontender, no HSM or masses and bowel sounds normal  NEURO: Normal strength and tone, sensory exam grossly normal, mentation intact and speech normal    DIAGNOSTICS:                                                    No labs or EKG required for low risk surgery (cataract, skin procedure, breast biopsy, etc)    Recent Labs   Lab Test  03/28/17   1114  01/27/17   1211  01/24/17   1957  01/22/17   1936   11/11/16   2234   08/29/16   2235   HGB   --    --   10.7*  11.0*   < >  10.2*   < >  11.0*   PLT   --    --   223  217   < >  199   < >  203   INR   --    --    --    --    --   Unsatisfactory specimen - tube underfilled  SPOKE WITH DR VARELA [id number], LY     --   0.86   NA   --    --   141  139   < >  139   < >  139   POTASSIUM   --    --   4.6  4.1   < >  4.5   < >  4.3   CR   --    --   0.94  0.68   < >  0.96   < >  0.66   A1C  7.2*  7.1*   --    --    --   9.7*   --    --     < > = values in this interval not displayed.        IMPRESSION:                                                    The proposed surgical procedure is considered LOW risk.    REVISED CARDIAC RISK INDEX  The patient has the following serious cardiovascular risks for perioperative complications such as (MI, PE, VFib and 3  AV Block):  No serious cardiac risks  INTERPRETATION: 0 risks: Class I (very low risk - 0.4% complication rate)    The patient has the following additional risks for perioperative complications:  No identified additional risks      ICD-10-CM    1. Preop general physical exam Z01.818    2. Senile cataracts of both eyes H25.9        RECOMMENDATIONS:                                                        --Patient is to take all scheduled medications on the day of surgery EXCEPT for modifications listed below.    APPROVAL GIVEN  to proceed with proposed procedure, without further diagnostic evaluation       Signed Electronically by: Usman Hodgson PA-C    Copy of this evaluation report is provided to requesting physician.    [facility] Preop Guidelines

## 2017-04-21 NOTE — NURSING NOTE
Chief Complaint   Patient presents with     Pre-Op Exam       Initial /58  Pulse 84  Temp 98.9  F (37.2  C) (Oral)  Resp 14  Ht 5' (1.524 m)  Wt 222 lb (100.7 kg)  LMP 01/06/2015  SpO2 96%  BMI 43.36 kg/m2 Estimated body mass index is 43.36 kg/(m^2) as calculated from the following:    Height as of this encounter: 5' (1.524 m).    Weight as of this encounter: 222 lb (100.7 kg).  Medication Reconciliation: complete         Haley Shelby MA

## 2017-05-02 DIAGNOSIS — E55.9 VITAMIN D DEFICIENCY: ICD-10-CM

## 2017-05-02 DIAGNOSIS — E78.5 HYPERLIPIDEMIA LDL GOAL <100: ICD-10-CM

## 2017-05-02 DIAGNOSIS — E11.40 TYPE 2 DIABETES MELLITUS WITH DIABETIC NEUROPATHY, WITH LONG-TERM CURRENT USE OF INSULIN (H): ICD-10-CM

## 2017-05-02 DIAGNOSIS — Z79.4 TYPE 2 DIABETES MELLITUS WITH MILD NONPROLIFERATIVE RETINOPATHY WITHOUT MACULAR EDEMA, WITH LONG-TERM CURRENT USE OF INSULIN, UNSPECIFIED LATERALITY (H): ICD-10-CM

## 2017-05-02 DIAGNOSIS — E11.3299 TYPE 2 DIABETES MELLITUS WITH MILD NONPROLIFERATIVE RETINOPATHY WITHOUT MACULAR EDEMA, WITH LONG-TERM CURRENT USE OF INSULIN, UNSPECIFIED LATERALITY (H): ICD-10-CM

## 2017-05-02 DIAGNOSIS — Z79.4 TYPE 2 DIABETES MELLITUS WITH DIABETIC NEUROPATHY, WITH LONG-TERM CURRENT USE OF INSULIN (H): ICD-10-CM

## 2017-05-02 DIAGNOSIS — I25.119 CORONARY ARTERY DISEASE INVOLVING NATIVE HEART WITH ANGINA PECTORIS, UNSPECIFIED VESSEL OR LESION TYPE (H): ICD-10-CM

## 2017-05-02 NOTE — TELEPHONE ENCOUNTER
losartan (COZAAR) 100 MG tablet      Last Written Prescription Date: 02/03/17  Last Fill Quantity: 30, # refills: 2  Last Office Visit with [facility], [facility] or  Health prescribing provider: 04/21/17    Next 5 appointments (look out 90 days)     Jun 02, 2017  9:30 AM CDT   Return Visit with Usman Hodgson PA-C   [facility] ([facility])    [address]  [address]  [facility] [address]   [phone]                   Potassium   Date Value Ref Range Status   01/24/2017 4.6 3.4 - 5.3 mmol/L Final     Creatinine   Date Value Ref Range Status   01/24/2017 0.94 0.52 - 1.04 mg/dL Final     BP Readings from Last 3 Encounters:   04/21/17 102/58   04/05/17 133/76   03/16/17 163/89         atorvastatin (LIPITOR) 80 MG tablet     Last Written Prescription Date: 02/23/17  Last Fill Quantity: 30, # refills: 2  Last Office Visit with [facility], [facility] or [facility] prescribing provider: 04/21/17  Next 5 appointments (look out 90 days)     Jun 02, 2017  9:30 AM CDT   Return Visit with Usman Hodgson PA-C   [facility] ([facility])    [address]  [address]  [facility] [address]   [phone]                   Lab Results   Component Value Date    CHOL 147 07/14/2015     Lab Results   Component Value Date    HDL 39 07/14/2015     Lab Results   Component Value Date     07/13/2016    LDL 78 07/14/2015     Lab Results   Component Value Date    TRIG 151 07/14/2015     Lab Results   Component Value Date    CHOLHDLRATIO 3.8 07/14/2015       metoprolol (TOPROL-XL) 100 MG 24 hr tablet      Last Written Prescription Date: 03/10/17  Last Fill Quantity: 30, # refills: 1    Last Office Visit with [facility], [facility] or  Health prescribing provider:  04/21/17   Future Office Visit:    Next 5 appointments (look out 90 days)     Jun 02, 2017  9:30 AM CDT   Return Visit with Usman oHdgson PA-C   [place]  [facility] ([facility])    [address]  [address]  [facility] [address]   [phone]                    BP Readings from Last 3 Encounters:   04/21/17 102/58   04/05/17 133/76   03/16/17 163/89         gabapentin (NEURONTIN) 300 MG capsule      Last Written Prescription Date: 03/15/17  Last Quantity: 168, # refills: 1  Last Office Visit with [facility] or [facility] provider: 04/21/17  Next 5 appointments (look out 90 days)     Jun 02, 2017  9:30 AM CDT   Return Visit with Usman Hodgson PA-C   [facility] ([facility])    [address]  [address]  [facility] [address] [phone]                   Creatinine   Date Value Ref Range Status   01/24/2017 0.94 0.52 - 1.04 mg/dL Final     Lab Results   Component Value Date    AST 22 01/24/2017     Lab Results   Component Value Date    ALT 24 01/24/2017     BP Readings from Last 3 Encounters:   04/21/17 102/58   04/05/17 133/76   03/16/17 163/89         dulaglutide (TRULICITY) 1.5 MG/0.5ML pen      Last Written Prescription Date:  03/27/17  Last Fill Quantity: 2ml,   # refills: 1  Last Office Visit with [facility] or [facility] prescribing provider: 04/21/17  Future Office visit:    Next 5 appointments (look out 90 days)     Jun 02, 2017  9:30 AM CDT   Return Visit with Usman Hodgson PA-C   [facility] ([facility])    [address]  [address]  [facility] [address]   [phone]                   Routing refill request to provider for review/approval because:  Drug not on the [facility], [facility] or [facility] refill protocol or controlled substance

## 2017-05-03 ENCOUNTER — TELEPHONE (OUTPATIENT)
Dept: FAMILY MEDICINE | Facility: CLINIC | Age: 57
End: 2017-05-03

## 2017-05-03 DIAGNOSIS — H26.9 CATARACT, LEFT: Primary | ICD-10-CM

## 2017-05-03 RX ORDER — ATORVASTATIN CALCIUM 80 MG/1
TABLET, FILM COATED ORAL
Qty: 60 TABLET | Refills: 1 | Status: SHIPPED | OUTPATIENT
Start: 2017-05-03 | End: 2017-08-21

## 2017-05-03 RX ORDER — LEVOFLOXACIN 5 MG/ML
SOLUTION/ DROPS TOPICAL
Qty: 2 BOTTLE | Refills: 1 | Status: ON HOLD | OUTPATIENT
Start: 2017-05-03 | End: 2017-08-29

## 2017-05-03 RX ORDER — METOPROLOL SUCCINATE 100 MG/1
TABLET, EXTENDED RELEASE ORAL
Qty: 60 TABLET | Refills: 1 | Status: SHIPPED | OUTPATIENT
Start: 2017-05-03 | End: 2017-08-21

## 2017-05-03 RX ORDER — DULAGLUTIDE 1.5 MG/.5ML
INJECTION, SOLUTION SUBCUTANEOUS
Qty: 2 ML | Refills: 11 | Status: SHIPPED | OUTPATIENT
Start: 2017-05-03 | End: 2018-04-11

## 2017-05-03 RX ORDER — LOSARTAN POTASSIUM 100 MG/1
TABLET ORAL
Qty: 90 TABLET | Refills: 1 | Status: SHIPPED | OUTPATIENT
Start: 2017-05-03 | End: 2017-10-16

## 2017-05-03 RX ORDER — OFLOXACIN 3 MG/ML
1 SOLUTION/ DROPS OPHTHALMIC
Status: CANCELLED | OUTPATIENT
Start: 2017-05-03 | End: 2017-05-03

## 2017-05-03 RX ORDER — GABAPENTIN 300 MG/1
CAPSULE ORAL
Qty: 168 CAPSULE | Refills: 3 | Status: SHIPPED | OUTPATIENT
Start: 2017-05-03 | End: 2017-08-21

## 2017-05-03 RX ORDER — PREDNISOLONE ACETATE 10 MG/ML
SUSPENSION/ DROPS OPHTHALMIC
Qty: 2 BOTTLE | Refills: 1 | Status: ON HOLD | OUTPATIENT
Start: 2017-05-03 | End: 2017-08-29

## 2017-05-03 NOTE — TELEPHONE ENCOUNTER
Staff faxed completed and signed [facility] Physician Order-Diabetic Form to [phone].      Haley Shelby MA

## 2017-05-03 NOTE — TELEPHONE ENCOUNTER
Fax received 05/02/17 requesting that a Physician Order- Diabetic Form be completed and faxed to [facility] at [phone]    Writer placed form in provider's folder     Val Valenzuela

## 2017-05-03 NOTE — TELEPHONE ENCOUNTER
Routing refill request to provider for review/approval because:  Drug not on the [facility] refill protocol           vitamin D3 (CHOLECALCIFEROL) 41382 UNITS capsule  Last Written Prescription Date: 3/30/17  Last Fill Quantity: 3 caps,  # refills: 1   Last Office Visit with [facility], [facility] or [facility] prescribing provider: 4/21/17                                         Next 5 appointments (look out 90 days)     Jun 02, 2017  9:30 AM CDT   Return Visit with Usman Hodgson PA-C   [facility] ([facility]    [address]   [address]  [facility] [address]   [phone]                    Indra Le RN

## 2017-05-04 NOTE — H&P (VIEW-ONLY)
[facility] PRIMARY CARE  [address]  [address]  [facility] [address]  [phone]  Dept: [phone]    PRE-OP EVALUATION:  Today's date: 2017    Nena Tang (: 1960) presents for pre-operative evaluation assessment as requested by Dr. Diaz.  She requires evaluation and anesthesia risk assessment prior to undergoing surgery/procedure for treatment of Cataracts removal of both eyes .  Proposed procedure: Phacoemulsification Clear Cornea with Standard Intraocular Lens Implant both eyes    Date of Surgery/ Procedure:   Time of Surgery/ Procedure: 10:30 AM  Hospital/Surgical Facility: [facility]  Primary Physician: No primary care provider on file.  Type of Anesthesia Anticipated: to be determined    Patient has a Health Care Directive or Living Will:  NO    1. NO - Do you have a history of heart attack, stroke, stent, bypass or surgery on an artery in the head, neck, heart or legs?  2. NO - Do you ever have any pain or discomfort in your chest?  3. NO - DO YOU HAVE A HISTORY OF HEART FAILURE   3. NO - Do you have a history of  Heart Failure?  4. YES - ARE YOUR TROUBLED BY SHORTNESS OF BREATH WHEN WALKING ON THE LEVEL, UP A SLIGHT HILL OR AT NIGHT?   4. NO - Are you troubled by shortness of breath when: walking on the level, up a slight hill or at night?  5. NO - Do you currently have a cold, bronchitis or other respiratory infection?  6. NO - Do you have a cough, shortness of breath or wheezing?  7. NO - Do you sometimes get pains in the calves of your legs when you walk?  8. NO - Do you or anyone in your family have previous history of blood clots?  9. NO - Do you or does anyone in your family have a serious bleeding problem such as prolonged bleeding following surgeries or cuts?  10. NO - Have you ever had problems with anemia or been told to take iron pills?  11. NO - Have you had any abnormal blood loss such as black, tarry or bloody stools, or abnormal vaginal  bleeding?  12. NO - Have you ever had a blood transfusion?  13. NO - Have you or any of your relatives ever had problems with anesthesia?  14. YES- Do you have sleep apnea, excessive snoring or daytime drowsiness?  15. NO - Do you have any prosthetic heart valves?  16. NO - Do you have prosthetic joints?  17. NO - Is there any chance that you may be pregnant?      HPI:                                                      Brief HPI related to upcoming procedure: Decreased vision over time warranting lens replacement.       See problem list for active medical problems.  Problems all longstanding and stable, except as noted/documented.  See ROS for pertinent symptoms related to these conditions.                                                                                                  .    MEDICAL HISTORY:                                                      Patient Active Problem List    Diagnosis Date Noted     CAD (coronary artery disease) 02/29/2012     Priority: High      Tako-Tsubo stress cardiomyopathy 2009. Mild coronary artery disease,        Moderate major depression (H) 06/08/2011     Priority: High     Psychophysiological insomnia 03/09/2017     Priority: Medium     Depression 12/28/2016     Priority: Medium     Schizoaffective disorder (H) 12/20/2016     Priority: Medium     History of uterine cancer 12/07/2016     Priority: Medium     Yearly pap's per the provider office visit note on 12/07/16.       Falls frequently 08/18/2016     Priority: Medium     Left cataract 07/25/2016     Priority: Medium     Alcohol use 04/19/2016     Priority: Medium     Tardive dyskinesia 09/11/2015     Priority: Medium     Mild nonproliferative diabetic retinopathy (H) 06/19/2014     Priority: Medium     Problem list name updated by automated process. Provider to review       Esophageal reflux 06/09/2014     Priority: Medium     Suicidal ideation 05/01/2014     Priority: Medium     Cocaine abuse, episodic 10/03/2013      Priority: Medium     Lumbago 09/20/2013     Priority: Medium     Cervicalgia 09/20/2013     Priority: Medium     Health Care Home 08/16/2013     Priority: Medium     EMERGENCY CARE PLAN  Presenting Problem Signs and Symptoms Treatment Plan    Questions or concerns during clinic hours    I will call the clinic directly     Questions or concerns outside clinic hours    I will call the 24 hour nurse line at [phone]    Patient needs to schedule an appointment    I will call the 24 hour scheduling team at [phone] or clinic directly    Same day treatment     I will call the clinic first, nurse line if after hours, urgent care and express care if needed     Primary Care Provider Dr. Honorio Dias [facility] [phone]  Nurse Care Coordinator Idalmis Nettles [phone]        HTN, goal below 140/90 07/29/2013     Priority: Medium     Migraine headache 04/22/2013     Priority: Medium     Schizoaffective disorder, depressive type (H) 02/25/2013     Priority: Medium     Chronic low back pain 01/22/2013     Priority: Medium     Verbal auditory hallucination 10/04/2012     Priority: Medium     CINDY (obstructive sleep apnea)- mild AHI 10.3 03/08/2012     Priority: Medium     Sleep study 3/12 (198#)-   AHI 10.3, with significant desaturations down to 74%. RDI 10.3. Periodic Limb Movement Index 3.2/hour.         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 Echo 2010, angio 2011 with normal LVF.        Vitamin B12 deficiency without anemia 11/23/2009     Priority: Low      Diagnosis updated by automated process. Provider to review and confirm.       Osteopenia 10/07/2009     Priority: Low     Dexa 2009- Lumbar Spine (L1-L4): T-score -1.8, Left Femoral Neck: T-score -1.0, Right Femoral Neck: T-score -1.0             Past Medical History:   Diagnosis Date     CAD (coronary artery disease)     5/2014 cath, nonbostructive stenosis to LAD, RCA.     Chronic low back pain 1/22/2013     Cocaine abuse, in remission      Fecal urgency 3/8/2012     History of heroin abuse      Hyperlipidemia LDL goal <100 10/31/2010     Hypertension 7/29/2013     Illiterate 8/30/2011     Irritable bowel syndrome      Migraine 4/19/2012     Migraine headache 4/22/2013     Moderate major depression (H) 6/8/2011     Noncompliance with medication regimen 6/8/2011     Obesity      CINDY (obstructive sleep apnea) 3/8/2012    uses CPAP     Osteopenia 10/7/2009     Schizoaffective disorder, depressive type (H) 2/25/2013     Suicidal intent 10/2/2013     Takotsubo cardiomyopathy      Type 2 diabetes mellitus (H) 8/30/2011     Uterine cancer (H) 1983     Verbal auditory hallucination 10/4/2012     Past Surgical History:   Procedure Laterality Date     C OOPHORECTORMY FOR MALIG, W/BX  1983    UTERINE     COLONOSCOPY N/A 3/16/2017    Procedure: COLONOSCOPY;  Surgeon: Traci Gonzalez MD;  Location:  GI     Coronary CTA  5/21/2014     HYSTERECTOMY  1983    uterine cancer yearly pap's per provider.     LAPAROSCOPIC CHOLECYSTECTOMY  2008     RELEASE TRIGGER FINGER  10/11/2012    Left thumb. Procedure: RELEASE TRIGGER FINGER;  LEFT THUMB TRIGGER RELEASE;  Surgeon: Tay Langley MD;  Location: [facility]     RELEASE TRIGGER FINGER Right 12/26/2016    Procedure: RELEASE TRIGGER FINGER;  Surgeon: Albino Castañeda MD;  Location: [facility] OR     Current Outpatient Prescriptions   Medication Sig Dispense Refill     ONE TOUCH ULTRA test strip TEST TWICE DAILY AS DIRECTED. 150 strip 11     polyethylene glycol  (MIRALAX/GLYCOLAX) powder TAKE 17 GRAMS (1 CAPFUL) BY MOUTH DAILY 527 g 3     NOVOLOG FLEXPEN 100 UNIT/ML soln INJECT 20 UNITS SUBCUTANEOUSLY THREE TIMES A DAY (WITH MEALS), INJECT AN EXTRA 7 UNITS ONCE DAILY FOR BLOOD SUGAR OVER 500 15 mL 3     vitamin D3 (CHOLECALCIFEROL) 81233 UNITS capsule Take 1 capsule (50,000 Units) by mouth every 7 days 3 capsule 1     dulaglutide (TRULICITY) 1.5 MG/0.5ML pen Inject 1.5 mg Subcutaneous every 7 days 2 mL 1     ULTICARE MINI 31G X 6 MM insulin pen needle USE 4 DAILY OR AS DIRECTED 100 each 11     LANTUS SOLOSTAR 100 UNIT/ML soln INJECT 45 UNITS SUBCUTANEOUSLY EVERY NIGHT AT BEDTIME 15 mL 3     gabapentin (NEURONTIN) 300 MG capsule TAKE 2 CAPSULES (600MG) BY MOUTH THREE TIMES A  capsule 1     metoprolol (TOPROL-XL) 100 MG 24 hr tablet TAKE 1 TABLET (100MG) BY MOUTH DAILY 30 tablet 1     aspirin (ASPIRIN LOW DOSE) 81 MG EC tablet Take 1 tablet (81 mg) by mouth daily (Patient taking differently: Take 81 mg by mouth every morning ) 100 tablet 4     omeprazole (PRILOSEC) 40 MG capsule Take 1 capsule (40 mg) by mouth daily (Patient taking differently: Take 40 mg by mouth every morning ) 30 capsule 5     atorvastatin (LIPITOR) 80 MG tablet Take 1 tablet (80 mg) by mouth daily (Patient taking differently: Take 80 mg by mouth every morning ) 30 tablet 2     losartan (COZAAR) 100 MG tablet Take 1 tablet (100 mg) by mouth daily (Patient taking differently: Take 100 mg by mouth every morning ) 30 tablet 2     ibuprofen (ADVIL,MOTRIN) 600 MG tablet Take 1 tablet (600 mg) by mouth every 4 hours as needed for moderate pain 60 tablet 0     melatonin 5 MG CAPS Take 1 capsule by mouth daily At dinnertime 90 capsule 1     glucose 4 G CHEW Take 2 every 15 minutes for blood sugar <70mg/dL. Recheck blood sugar every 15 minutes until above 70mg/dL, then eat a substantial meal. 20 tablet 1     order for DME Hold Novolog if meals are refused. 1 each 0     blood glucose monitoring (ONE TOUCH  DELICA) lancets Use to test blood sugar 2 times daily or as directed.  Ok to substitute alternative if insurance prefers. 1 Box prn     citalopram (CELEXA) 20 MG tablet Take 1 tablet (20 mg) by mouth daily 30 tablet 2     haloperidol (HALDOL) 5 MG tablet Take 1 tablet (5 mg) by mouth At Bedtime 30 tablet 2     benztropine (COGENTIN) 1 MG tablet Take 1 tablet (1 mg) by mouth 2 times daily 60 tablet 3     order for DME Equipment being ordered: Rolling walker 1 Device 0     senna-docusate (SENOKOT-S;PERICOLACE) 8.6-50 MG per tablet Take 1 tablet by mouth 2 times daily as needed for constipation 100 tablet 1     acetaminophen (TYLENOL) 500 MG tablet Take 2 tablets (1,000 mg) by mouth every 6 hours as needed for pain (Patient not taking: Reported on 4/21/2017) 100 tablet 0     OTC products: None, except as noted above    Allergies   Allergen Reactions     Imidazole Antifungals Hives     Tolerates diflucan     Ketoprofen Itching     Pruritis to topical     Lisinopril Hives     Metformin Other (See Comments)     Patient hospitalized for lactic acidosis - admitting provider suspectd caused by metformin     Metronidazole Hives      Latex Allergy: NO    Social History   Substance Use Topics     Smoking status: Never Smoker     Smokeless tobacco: Never Used     Alcohol use No      Comment: last month     History   Drug Use No     Comment: history of       REVIEW OF SYSTEMS:                                                    C: NEGATIVE for fever, chills, change in weight  E/M: NEGATIVE for ear, mouth and throat problems  R: NEGATIVE for significant cough or SOB  CV: NEGATIVE for chest pain, palpitations or peripheral edema    EXAM:                                                    /58  Pulse 84  Temp 98.9  F (37.2  C) (Oral)  Resp 14  Ht 5' (1.524 m)  Wt 222 lb (100.7 kg)  LMP 01/06/2015  SpO2 96%  BMI 43.36 kg/m2  GENERAL APPEARANCE: healthy, alert and no distress  HENT: ear canals and TM's normal and nose and  mouth without ulcers or lesions  RESP: lungs clear to auscultation - no rales, rhonchi or wheezes  CV: regular rate and rhythm, normal S1 S2, no S3 or S4 and no murmur, click or rub   ABDOMEN: soft, nontender, no HSM or masses and bowel sounds normal  NEURO: Normal strength and tone, sensory exam grossly normal, mentation intact and speech normal    DIAGNOSTICS:                                                    No labs or EKG required for low risk surgery (cataract, skin procedure, breast biopsy, etc)    Recent Labs   Lab Test  03/28/17   1114  01/27/17   1211  01/24/17   1957  01/22/17   1936   11/11/16   2234   08/29/16   2235   HGB   --    --   10.7*  11.0*   < >  10.2*   < >  11.0*   PLT   --    --   223  217   < >  199   < >  203   INR   --    --    --    --    --   Unsatisfactory specimen - tube underfilled  SPOKE WITH DR VARELA [id number], LY     --   0.86   NA   --    --   141  139   < >  139   < >  139   POTASSIUM   --    --   4.6  4.1   < >  4.5   < >  4.3   CR   --    --   0.94  0.68   < >  0.96   < >  0.66   A1C  7.2*  7.1*   --    --    --   9.7*   --    --     < > = values in this interval not displayed.        IMPRESSION:                                                    The proposed surgical procedure is considered LOW risk.    REVISED CARDIAC RISK INDEX  The patient has the following serious cardiovascular risks for perioperative complications such as (MI, PE, VFib and 3  AV Block):  No serious cardiac risks  INTERPRETATION: 0 risks: Class I (very low risk - 0.4% complication rate)    The patient has the following additional risks for perioperative complications:  No identified additional risks      ICD-10-CM    1. Preop general physical exam Z01.818    2. Senile cataracts of both eyes H25.9        RECOMMENDATIONS:                                                        --Patient is to take all scheduled medications on the day of surgery EXCEPT for modifications listed below.    APPROVAL GIVEN  to proceed with proposed procedure, without further diagnostic evaluation       Signed Electronically by: Usman Hodgson PA-C    Copy of this evaluation report is provided to requesting physician.    [facility] Preop Guidelines

## 2017-05-05 ENCOUNTER — ANESTHESIA EVENT (OUTPATIENT)
Dept: SURGERY | Facility: CLINIC | Age: 57
End: 2017-05-05
Payer: MEDICARE

## 2017-05-05 ENCOUNTER — ANESTHESIA (OUTPATIENT)
Dept: SURGERY | Facility: CLINIC | Age: 57
End: 2017-05-05
Payer: MEDICARE

## 2017-05-05 ENCOUNTER — HOSPITAL ENCOUNTER (OUTPATIENT)
Facility: CLINIC | Age: 57
Discharge: HOME | End: 2017-05-05
Attending: OPHTHALMOLOGY | Admitting: OPHTHALMOLOGY
Payer: MEDICARE

## 2017-05-05 ENCOUNTER — OFFICE VISIT (OUTPATIENT)
Dept: OPHTHALMOLOGY | Facility: CLINIC | Age: 57
End: 2017-05-05

## 2017-05-05 VITALS
SYSTOLIC BLOOD PRESSURE: 108 MMHG | BODY MASS INDEX: 43.59 KG/M2 | TEMPERATURE: 97.2 F | DIASTOLIC BLOOD PRESSURE: 72 MMHG | WEIGHT: 222 LBS | RESPIRATION RATE: 16 BRPM | OXYGEN SATURATION: 95 % | HEIGHT: 60 IN

## 2017-05-05 DIAGNOSIS — Z96.1 PSEUDOPHAKIA, LEFT EYE: Primary | ICD-10-CM

## 2017-05-05 LAB
GLUCOSE BLDC GLUCOMTR-MCNC: 127 MG/DL (ref 70–99)
GLUCOSE BLDC GLUCOMTR-MCNC: 143 MG/DL (ref 70–99)

## 2017-05-05 PROCEDURE — 40000170 ZZH STATISTIC PRE-PROCEDURE ASSESSMENT II: Performed by: OPHTHALMOLOGY

## 2017-05-05 PROCEDURE — 25000128 H RX IP 250 OP 636: Performed by: NURSE ANESTHETIST, CERTIFIED REGISTERED

## 2017-05-05 PROCEDURE — 25000128 H RX IP 250 OP 636: Performed by: OPHTHALMOLOGY

## 2017-05-05 PROCEDURE — 37000009 ZZH ANESTHESIA TECHNICAL FEE, EACH ADDTL 15 MIN: Performed by: OPHTHALMOLOGY

## 2017-05-05 PROCEDURE — 36000101 ZZH EYE SURGERY LEVEL 3 1ST 30 MIN: Performed by: OPHTHALMOLOGY

## 2017-05-05 PROCEDURE — 25000125 ZZHC RX 250: Performed by: ANESTHESIOLOGY

## 2017-05-05 PROCEDURE — 25000125 ZZHC RX 250: Performed by: NURSE ANESTHETIST, CERTIFIED REGISTERED

## 2017-05-05 PROCEDURE — V2632 POST CHMBR INTRAOCULAR LENS: HCPCS | Performed by: OPHTHALMOLOGY

## 2017-05-05 PROCEDURE — 25000125 ZZHC RX 250: Performed by: OPHTHALMOLOGY

## 2017-05-05 PROCEDURE — 25800025 ZZH RX 258: Performed by: ANESTHESIOLOGY

## 2017-05-05 PROCEDURE — 82962 GLUCOSE BLOOD TEST: CPT | Mod: 91

## 2017-05-05 PROCEDURE — A9270 NON-COVERED ITEM OR SERVICE: HCPCS | Mod: GY | Performed by: OPHTHALMOLOGY

## 2017-05-05 PROCEDURE — 25000132 ZZH RX MED GY IP 250 OP 250 PS 637: Mod: GY | Performed by: OPHTHALMOLOGY

## 2017-05-05 PROCEDURE — 71000028 ZZH EYE RECOVERY PHASE 2 EACH 15 MINS: Performed by: OPHTHALMOLOGY

## 2017-05-05 PROCEDURE — 27210794 ZZH OR GENERAL SUPPLY STERILE: Performed by: OPHTHALMOLOGY

## 2017-05-05 PROCEDURE — 37000008 ZZH ANESTHESIA TECHNICAL FEE, 1ST 30 MIN: Performed by: OPHTHALMOLOGY

## 2017-05-05 DEVICE — EYE IMP IOL AMO PCL TECNIS ZCB00 11.0: Type: IMPLANTABLE DEVICE | Site: EYE | Status: FUNCTIONAL

## 2017-05-05 RX ORDER — PHENYLEPHRINE HYDROCHLORIDE 25 MG/ML
1 SOLUTION/ DROPS OPHTHALMIC
Status: COMPLETED | OUTPATIENT
Start: 2017-05-05 | End: 2017-05-05

## 2017-05-05 RX ORDER — NALOXONE HYDROCHLORIDE 0.4 MG/ML
.1-.4 INJECTION, SOLUTION INTRAMUSCULAR; INTRAVENOUS; SUBCUTANEOUS
Status: DISCONTINUED | OUTPATIENT
Start: 2017-05-05 | End: 2017-05-05 | Stop reason: HOSPADM

## 2017-05-05 RX ORDER — FENTANYL CITRATE 50 UG/ML
25-50 INJECTION, SOLUTION INTRAMUSCULAR; INTRAVENOUS
Status: DISCONTINUED | OUTPATIENT
Start: 2017-05-05 | End: 2017-05-05 | Stop reason: HOSPADM

## 2017-05-05 RX ORDER — MEPERIDINE HYDROCHLORIDE 25 MG/ML
12.5 INJECTION INTRAMUSCULAR; INTRAVENOUS; SUBCUTANEOUS
Status: DISCONTINUED | OUTPATIENT
Start: 2017-05-05 | End: 2017-05-05 | Stop reason: HOSPADM

## 2017-05-05 RX ORDER — BALANCED SALT SOLUTION 6.4; .75; .48; .3; 3.9; 1.7 MG/ML; MG/ML; MG/ML; MG/ML; MG/ML; MG/ML
SOLUTION OPHTHALMIC PRN
Status: DISCONTINUED | OUTPATIENT
Start: 2017-05-05 | End: 2017-05-05 | Stop reason: HOSPADM

## 2017-05-05 RX ORDER — PROPARACAINE HYDROCHLORIDE 5 MG/ML
1 SOLUTION/ DROPS OPHTHALMIC ONCE
Status: COMPLETED | OUTPATIENT
Start: 2017-05-05 | End: 2017-05-05

## 2017-05-05 RX ORDER — ONDANSETRON 2 MG/ML
4 INJECTION INTRAMUSCULAR; INTRAVENOUS EVERY 30 MIN PRN
Status: DISCONTINUED | OUTPATIENT
Start: 2017-05-05 | End: 2017-05-05 | Stop reason: HOSPADM

## 2017-05-05 RX ORDER — SODIUM CHLORIDE, SODIUM LACTATE, POTASSIUM CHLORIDE, CALCIUM CHLORIDE 600; 310; 30; 20 MG/100ML; MG/100ML; MG/100ML; MG/100ML
INJECTION, SOLUTION INTRAVENOUS CONTINUOUS
Status: DISCONTINUED | OUTPATIENT
Start: 2017-05-05 | End: 2017-05-05 | Stop reason: HOSPADM

## 2017-05-05 RX ORDER — ACETAMINOPHEN 500 MG
1000 TABLET ORAL ONCE
Status: COMPLETED | OUTPATIENT
Start: 2017-05-05 | End: 2017-05-05

## 2017-05-05 RX ORDER — MOXIFLOXACIN 5 MG/ML
1 SOLUTION/ DROPS OPHTHALMIC
Status: COMPLETED | OUTPATIENT
Start: 2017-05-05 | End: 2017-05-05

## 2017-05-05 RX ORDER — SODIUM CHLORIDE, SODIUM LACTATE, POTASSIUM CHLORIDE, CALCIUM CHLORIDE 600; 310; 30; 20 MG/100ML; MG/100ML; MG/100ML; MG/100ML
500 INJECTION, SOLUTION INTRAVENOUS CONTINUOUS
Status: DISCONTINUED | OUTPATIENT
Start: 2017-05-05 | End: 2017-05-05 | Stop reason: HOSPADM

## 2017-05-05 RX ORDER — TROPICAMIDE 10 MG/ML
1 SOLUTION/ DROPS OPHTHALMIC
Status: COMPLETED | OUTPATIENT
Start: 2017-05-05 | End: 2017-05-05

## 2017-05-05 RX ORDER — PREDNISOLONE ACETATE 1 %
SUSPENSION, DROPS(FINAL DOSAGE FORM)(ML) OPHTHALMIC (EYE) PRN
Status: DISCONTINUED | OUTPATIENT
Start: 2017-05-05 | End: 2017-05-05 | Stop reason: HOSPADM

## 2017-05-05 RX ORDER — ONDANSETRON 4 MG/1
4 TABLET, ORALLY DISINTEGRATING ORAL EVERY 30 MIN PRN
Status: DISCONTINUED | OUTPATIENT
Start: 2017-05-05 | End: 2017-05-05 | Stop reason: HOSPADM

## 2017-05-05 RX ORDER — LIDOCAINE HYDROCHLORIDE 10 MG/ML
INJECTION, SOLUTION EPIDURAL; INFILTRATION; INTRACAUDAL; PERINEURAL PRN
Status: DISCONTINUED | OUTPATIENT
Start: 2017-05-05 | End: 2017-05-05 | Stop reason: HOSPADM

## 2017-05-05 RX ORDER — CYCLOPENTOLATE HYDROCHLORIDE 10 MG/ML
1 SOLUTION/ DROPS OPHTHALMIC
Status: COMPLETED | OUTPATIENT
Start: 2017-05-05 | End: 2017-05-05

## 2017-05-05 RX ADMIN — PHENYLEPHRINE HYDROCHLORIDE 1 DROP: 2.5 SOLUTION/ DROPS OPHTHALMIC at 09:50

## 2017-05-05 RX ADMIN — CYCLOPENTOLATE HYDROCHLORIDE 1 DROP: 10 SOLUTION/ DROPS OPHTHALMIC at 09:50

## 2017-05-05 RX ADMIN — TROPICAMIDE 1 DROP: 10 SOLUTION/ DROPS OPHTHALMIC at 09:43

## 2017-05-05 RX ADMIN — CYCLOPENTOLATE HYDROCHLORIDE 1 DROP: 10 SOLUTION/ DROPS OPHTHALMIC at 09:55

## 2017-05-05 RX ADMIN — TROPICAMIDE 1 DROP: 10 SOLUTION/ DROPS OPHTHALMIC at 09:51

## 2017-05-05 RX ADMIN — PHENYLEPHRINE HYDROCHLORIDE 1 DROP: 2.5 SOLUTION/ DROPS OPHTHALMIC at 09:43

## 2017-05-05 RX ADMIN — TROPICAMIDE 1 DROP: 10 SOLUTION/ DROPS OPHTHALMIC at 09:55

## 2017-05-05 RX ADMIN — MIDAZOLAM HYDROCHLORIDE 1 MG: 1 INJECTION, SOLUTION INTRAMUSCULAR; INTRAVENOUS at 11:04

## 2017-05-05 RX ADMIN — CYCLOPENTOLATE HYDROCHLORIDE 1 DROP: 10 SOLUTION/ DROPS OPHTHALMIC at 09:43

## 2017-05-05 RX ADMIN — MOXIFLOXACIN HYDROCHLORIDE 1 DROP: 5 SOLUTION/ DROPS OPHTHALMIC at 09:51

## 2017-05-05 RX ADMIN — ACETAMINOPHEN 1000 MG: 500 TABLET ORAL at 11:46

## 2017-05-05 RX ADMIN — SODIUM CHLORIDE, POTASSIUM CHLORIDE, SODIUM LACTATE AND CALCIUM CHLORIDE 500 ML: 600; 310; 30; 20 INJECTION, SOLUTION INTRAVENOUS at 10:14

## 2017-05-05 RX ADMIN — PROPARACAINE HYDROCHLORIDE 1 DROP: 5 SOLUTION/ DROPS OPHTHALMIC at 09:43

## 2017-05-05 RX ADMIN — MOXIFLOXACIN HYDROCHLORIDE 1 DROP: 5 SOLUTION/ DROPS OPHTHALMIC at 09:43

## 2017-05-05 RX ADMIN — PHENYLEPHRINE HYDROCHLORIDE 1 DROP: 2.5 SOLUTION/ DROPS OPHTHALMIC at 09:55

## 2017-05-05 RX ADMIN — DEXMEDETOMIDINE HYDROCHLORIDE 4 MCG: 100 INJECTION, SOLUTION INTRAVENOUS at 11:03

## 2017-05-05 RX ADMIN — DEXMEDETOMIDINE HYDROCHLORIDE 12 MCG: 100 INJECTION, SOLUTION INTRAVENOUS at 10:55

## 2017-05-05 RX ADMIN — LIDOCAINE HYDROCHLORIDE 1 ML: 10 INJECTION, SOLUTION EPIDURAL; INFILTRATION; INTRACAUDAL; PERINEURAL at 10:14

## 2017-05-05 RX ADMIN — MOXIFLOXACIN HYDROCHLORIDE 1 DROP: 5 SOLUTION/ DROPS OPHTHALMIC at 09:55

## 2017-05-05 ASSESSMENT — TONOMETRY
IOP_METHOD: TONOPEN
IOP_METHOD: TONOPEN
OS_IOP_MMHG: 13
OS_IOP_MMHG: 29

## 2017-05-05 ASSESSMENT — VISUAL ACUITY
METHOD: SNELLEN - LINEAR
OS_SC: 20/200

## 2017-05-05 ASSESSMENT — EXTERNAL EXAM - LEFT EYE: OS_EXAM: NORMAL

## 2017-05-05 ASSESSMENT — SLIT LAMP EXAM - LIDS: COMMENTS: NORMAL

## 2017-05-05 NOTE — DISCHARGE INSTRUCTIONS
Dr. Tyra Diaz  [facility]  [phone]  Post Operative Cataract Instructions      If you only have a clear eye shield on, you may remove the eye shield on arrival home and begin eye drops today as directed by Dr. Diaz.      Wear the clear eye shield when sleeping for protection for 5 days.      Do not rub the operated eye.      Light sensitivity may be noticed. Sunglasses may be worn for comfort.      Some discomfort and irritation may be noticed. Acetaminophen (Tylenol) or Ibuprofen (Advil) may be taken for discomfort. If pain persists please call Dr. Diaz's office.      Keep the operated eye dry. You may wash your hair, bathe or shower, but keep the operated eye closed while doing so.       Bring your prescribed eye drops with you to your scheduled post-operative appointment.      Use medication exactly as prescribed by your doctor. You may restart your regular home medications.       Call Dr. Diaz's office at [phone] if any of the following should occur:    - Any sudden vision changes, including decreased vision  - Nausea or severe headache  - Increase in pain not controlled  - Signs of infection (pus, increasing redness or tenderness)  - Severe sensitivity to light    [facility] Discharge  Instructions  Anesthesia ([facility])   Adult Discharge Instructions    For 24 hours after surgery    1. Get plenty of rest.  Make arrangements to have a responsible adult stay with you for at least 6 hours after you leave the hospital.  2. Do not drive or use heavy equipment for 24 hours.    3. Do not drink alcohol for 24 hours.  4. Do not sign legal documents or make important decisions for 24 hours.  5. Avoid strenuous or risky activities. You may feel lightheaded.  If so, sit for a few minutes before standing.  Have someone help you get up.   6. Conscious sedation patients may resume a regular diet..  7. Any questions of medical nature, call your  physician.

## 2017-05-05 NOTE — ANESTHESIA POSTPROCEDURE EVALUATION
Patient: Nena Tang    Procedure(s):  LEFT EYE PHACOEMULSIFICATION CLEAR CORNEA WITH STANDARD INTRAOCULAR LENS IMPLANT  - Wound Class: I-Clean    Diagnosis:LEFT EYE CATARACT  Diagnosis Additional Information: No value filed.    Anesthesia Type:  MAC    Note:  Anesthesia Post Evaluation    Patient location during evaluation: bedside  Patient participation: Able to fully participate in evaluation  Level of consciousness: awake  Pain management: adequate  Airway patency: patent  Cardiovascular status: acceptable  Respiratory status: acceptable  Hydration status: acceptable  PONV: none     Anesthetic complications: None          Last vitals:  Vitals:    05/05/17 0952 05/05/17 1130 05/05/17 1200   BP: 147/81 105/65 108/72   Resp: 16 16 16   Temp: 36.2  C (97.2  F)     SpO2: 98% 94% 95%         Electronically Signed By: Kaushik Rosales DO, DO  May 5, 2017  1:29 PM

## 2017-05-05 NOTE — MR AVS SNAPSHOT
After Visit Summary   5/5/2017    Nena Tang    MRN: [id number]           Patient Information     Date Of Birth          1960        Visit Information        Provider Department      5/5/2017 12:21 PM Tyra Diaz MD [facility]        Today's Diagnoses     Pseudophakia, left eye    -  1       Follow-ups after your visit        Your next 10 appointments already scheduled     May 18, 2017 10:30 AM CDT   Return [place] Patient with William Eng [place]   [facility] [place]    [address]  [facility] [address]   [phone]            May 25, 2017 10:15 AM CDT   RETURN RETINA with Tiburcio Chacon MD   [facility] ([facility])    [address]  [address]  [facility] [address]   [phone]            Jun 02, 2017  9:30 AM CDT   Return Visit with Usman Hodgson PA-C   [facility]    [address]  [address]  [facility] [address]   [phone]            Jun 05, 2017  2:00 PM CDT   Post-Op with Tyra Diaz MD   [facility] ([facility])    [address]  [address] [facility]  [address]  [facility] [address]   [phone]              Who to contact     Please call your clinic at [phone] to:    Ask questions about your health    Make or cancel appointments    Discuss your medicines    Learn about your test results    Speak to your doctor   If you have compliments or concerns about an experience at your clinic, or if you wish to file a complaint, please contact [facility] Patient Relations at [phone] or email us at [email or web address]         Additional Information About Your Visit        MyChart Information     MyChart gives you secure access to your electronic health record. If you see a primary care  provider, you can also send messages to your care team and make appointments. If you have questions, please call your primary care clinic.  If you do not have a primary care provider, please call [phone] and they will assist you.      [employer] is an electronic gateway that provides easy, online access to your medical records. With [employer], you can request a clinic appointment, read your test results, renew a prescription or communicate with your care team.     To access your existing account, please contact your [facility] or call [phone] for assistance.        Care EveryWhere ID     This is your Care EveryWhere ID. This could be used by other organizations to access your [place] medical records  [id number]        Your Vitals Were     Last Period                   01/06/2015            Blood Pressure from Last 3 Encounters:   05/05/17 108/72   04/21/17 102/58   04/05/17 133/76    Weight from Last 3 Encounters:   05/05/17 100.7 kg (222 lb)   04/21/17 100.7 kg (222 lb)   04/05/17 101.8 kg (224 lb 8 oz)              Today, you had the following     No orders found for display         Today's Medication Changes          These changes are accurate as of: 5/5/17 12:53 PM.  If you have any questions, ask your nurse or doctor.               These medicines have changed or have updated prescriptions.        Dose/Directions    aspirin 81 MG EC tablet   Commonly known as:  ASPIRIN LOW DOSE   This may have changed:  when to take this   Used for:  Coronary artery disease involving native heart with angina pectoris, unspecified vessel or lesion type (H)        Dose:  81 mg   Take 1 tablet (81 mg) by mouth daily   Quantity:  100 tablet   Refills:  4       omeprazole 40 MG capsule   Commonly known as:  priLOSEC   This may have changed:  when to take this   Used for:  Gastroesophageal reflux disease without esophagitis        Dose:  40 mg   Take 1 capsule (40 mg) by mouth daily   Quantity:   30 capsule   Refills:  5                Primary Care Provider    None Specified       No primary provider on file.        Goals        General    Medical (pt-stated)     Notes - Note created  7/7/2016  9:15 AM by Chelsea Pulido RN    Get blood sugars under control    Improve medication compliance    As of today's date 7/7/2016 goal is met at 0 - 25%.   Goal Status:  Active          Thank you!     Thank you for choosing [facility]  for your care. Our goal is always to provide you with excellent care. Hearing back from our patients is one way we can continue to improve our services. Please take a few minutes to complete the written survey that you may receive in the mail after your visit with us. Thank you!             Your Updated Medication List - Protect others around you: Learn how to safely use, store and throw away your medicines at www.disposemymeds.org.          This list is accurate as of: 5/5/17 12:53 PM.  Always use your most recent med list.                   Brand Name Dispense Instructions for use    acetaminophen 500 MG tablet    TYLENOL    100 tablet    Take 2 tablets (1,000 mg) by mouth every 6 hours as needed for pain       aspirin 81 MG EC tablet    ASPIRIN LOW DOSE    100 tablet    Take 1 tablet (81 mg) by mouth daily       atorvastatin 80 MG tablet    LIPITOR    60 tablet    TAKE 1 TABLET (80MG) BY MOUTH DAILY       benztropine 1 MG tablet    COGENTIN    60 tablet    Take 1 tablet (1 mg) by mouth 2 times daily       blood glucose monitoring lancets     1 Box    Use to test blood sugar 2 times daily or as directed.  Ok to substitute alternative if insurance prefers.       citalopram 20 MG tablet    celeXA    30 tablet    Take 1 tablet (20 mg) by mouth daily       gabapentin 300 MG capsule    NEURONTIN    168 capsule    TAKE 2 CAPSULES (600MG) BY MOUTH THREE TIMES A DAY       glucose 4 G Chew chewable tablet     20 tablet    Take 2 every 15 minutes for blood sugar <70mg/dL. Recheck blood  sugar every 15 minutes until above 70mg/dL, then eat a substantial meal.       haloperidol 5 MG tablet    HALDOL    30 tablet    Take 1 tablet (5 mg) by mouth At Bedtime       ibuprofen 600 MG tablet    ADVIL/MOTRIN    60 tablet    Take 1 tablet (600 mg) by mouth every 4 hours as needed for moderate pain       LANTUS SOLOSTAR 100 UNIT/ML injection   Generic drug:  insulin glargine     15 mL    INJECT 45 UNITS SUBCUTANEOUSLY EVERY NIGHT AT BEDTIME       levofloxacin 0.5 % ophthalmic solution    QUIXIN    2 Bottle    1 drop in surgical eye as directed - 4x daily for 1 week, then stop       losartan 100 MG tablet    COZAAR    90 tablet    TAKE 1 TABLET (100MG) BY MOUTH DAILY       melatonin 5 MG Caps     90 capsule    Take 1 capsule by mouth daily At dinnertime       metoprolol 100 MG 24 hr tablet    TOPROL-XL    60 tablet    TAKE 1 TABLET (100MG) BY MOUTH DAILY       NovoLOG FLEXPEN 100 UNIT/ML injection   Generic drug:  insulin aspart     15 mL    INJECT 20 UNITS SUBCUTANEOUSLY THREE TIMES A DAY (WITH MEALS), INJECT AN EXTRA 7 UNITS ONCE DAILY FOR BLOOD SUGAR OVER 500       omeprazole 40 MG capsule    priLOSEC    30 capsule    Take 1 capsule (40 mg) by mouth daily       ONE TOUCH ULTRA test strip   Generic drug:  blood glucose monitoring     150 strip    TEST TWICE DAILY AS DIRECTED.       * order for DME     1 Device    Equipment being ordered: Rolling walker       * order for DME     1 each    Hold Novolog if meals are refused.       polyethylene glycol powder    MIRALAX/GLYCOLAX    527 g    TAKE 17 GRAMS (1 CAPFUL) BY MOUTH DAILY       prednisoLONE acetate 1 % ophthalmic susp    PRED FORTE    2 Bottle    1 drop in surgical eye as directed, 4x daily after surgery for 1 week, 3x daily for 1 week, 2x daily for 1 week, daily for 1 week, then stop       senna-docusate 8.6-50 MG per tablet    SENOKOT-S;PERICOLACE    100 tablet    Take 1 tablet by mouth 2 times daily as needed for constipation       TRULICITY 1.5  MG/0.5ML pen   Generic drug:  dulaglutide     2 mL    INJECT 1.5MG SUBCUTANEOUSLY EVERY SEVEN DAYS       ULTICARE MINI 31G X 6 MM   Generic drug:  insulin pen needle     100 each    USE 4 DAILY OR AS DIRECTED       vitamin D3 07573 UNITS capsule    CHOLECALCIFEROL    3 capsule    Take 1 capsule (50,000 Units) by mouth every 7 days       * Notice:  This list has 2 medication(s) that are the same as other medications prescribed for you. Read the directions carefully, and ask your doctor or other care provider to review them with you.

## 2017-05-05 NOTE — ANESTHESIA PREPROCEDURE EVALUATION
Anesthesia Evaluation     .             ROS/MED HX    ENT/Pulmonary:     (+)sleep apnea, uses CPAP , . .    Neurologic:       Cardiovascular:     (+) Dyslipidemia, hypertension--CAD, --. : . CHF etiology: Takatsubo 2009. Cath 2014 showed non-obstructive CAD.  . . :. .       METS/Exercise Tolerance:     Hematologic:         Musculoskeletal:         GI/Hepatic:     (+) GERD       Renal/Genitourinary:         Endo:     (+) type II DM Obesity, .      Psychiatric:     (+) psychiatric history depression      Infectious Disease:         Malignancy:         Other:                     Physical Exam  Normal systems: cardiovascular, pulmonary and dental    Airway   Mallampati: II  TM distance: >3 FB  Neck ROM: full    Dental     Cardiovascular       Pulmonary                     Anesthesia Plan      History & Physical Review  History and physical reviewed and following examination; no interval change.    ASA Status:  3 .        Plan for MAC Reason for MAC:  Procedure to face, neck, head or breast  PONV prophylaxis:  Ondansetron (or other 5HT-3)       Postoperative Care  Postoperative pain management:  IV analgesics.      Consents  Anesthetic plan, risks, benefits and alternatives discussed with:  Patient..          Procedure: Procedure(s):  PHACOEMULSIFICATION CLEAR CORNEA WITH STANDARD INTRAOCULAR LENS IMPLANT  Preop diagnosis: LEFT EYE CATARACT    Allergies   Allergen Reactions     Imidazole Antifungals Hives     Tolerates diflucan     Ketoprofen Itching     Pruritis to topical     Lisinopril Hives     Metformin Other (See Comments)     Patient hospitalized for lactic acidosis - admitting provider suspectd caused by metformin     Metronidazole Hives     Past Medical History:   Diagnosis Date     CAD (coronary artery disease)     5/2014 cath, nonbostructive stenosis to LAD, RCA.     Chronic low back pain 1/22/2013     Cocaine abuse, in remission      Fecal urgency 3/8/2012     History of heroin abuse      Hyperlipidemia LDL  goal <100 10/31/2010     Hypertension 7/29/2013     Illiterate 8/30/2011     Irritable bowel syndrome      Migraine 4/19/2012     Migraine headache 4/22/2013     Moderate major depression (H) 6/8/2011     Noncompliance with medication regimen 6/8/2011     Obesity      CINDY (obstructive sleep apnea) 3/8/2012    uses CPAP     Osteopenia 10/7/2009     Schizoaffective disorder, depressive type (H) 2/25/2013     Suicidal intent 10/2/2013     Takotsubo cardiomyopathy      Type 2 diabetes mellitus (H) 8/30/2011     Uterine cancer (H) 1983     Verbal auditory hallucination 10/4/2012     Past Surgical History:   Procedure Laterality Date     C OOPHORECTORMY FOR MALIG, W/BX  1983    UTERINE     COLONOSCOPY N/A 3/16/2017    Procedure: COLONOSCOPY;  Surgeon: Traci Gonzalez MD;  Location:  GI     Coronary CTA  5/21/2014     HYSTERECTOMY  1983    uterine cancer yearly pap's per provider.     LAPAROSCOPIC CHOLECYSTECTOMY  2008     RELEASE TRIGGER FINGER  10/11/2012    Left thumb. Procedure: RELEASE TRIGGER FINGER;  LEFT THUMB TRIGGER RELEASE;  Surgeon: Tay Langley MD;  Location: [facility]     RELEASE TRIGGER FINGER Right 12/26/2016    Procedure: RELEASE TRIGGER FINGER;  Surgeon: Albino Castañeda MD;  Location:  OR     Prior to Admission medications    Medication Sig Start Date End Date Taking? Authorizing Provider   losartan (COZAAR) 100 MG tablet TAKE 1 TABLET (100MG) BY MOUTH DAILY 5/3/17   Usman Hodgson PA-C   atorvastatin (LIPITOR) 80 MG tablet TAKE 1 TABLET (80MG) BY MOUTH DAILY 5/3/17   Usman Hodgson PA-C   metoprolol (TOPROL-XL) 100 MG 24 hr tablet TAKE 1 TABLET (100MG) BY MOUTH DAILY 5/3/17   Usman Hodgson PA-C   gabapentin (NEURONTIN) 300 MG capsule TAKE 2 CAPSULES (600MG) BY MOUTH THREE TIMES A DAY 5/3/17   Usman Hodgson PA-C   TRULICITY 1.5 MG/0.5ML pen INJECT 1.5MG SUBCUTANEOUSLY EVERY SEVEN DAYS 5/3/17   Usman Hodgson PA-C   levofloxacin (QUIXIN)  0.5 % ophthalmic solution 1 drop in surgical eye as directed - 4x daily for 1 week, then stop 5/3/17   Tyra Diaz MD   prednisoLONE acetate (PRED FORTE) 1 % ophthalmic susp 1 drop in surgical eye as directed, 4x daily after surgery for 1 week, 3x daily for 1 week, 2x daily for 1 week, daily for 1 week, then stop 5/3/17   Tyra Diaz MD   ONE TOUCH ULTRA test strip TEST TWICE DAILY AS DIRECTED. 4/13/17   Usman Hodgson PA-C   polyethylene glycol (MIRALAX/GLYCOLAX) powder TAKE 17 GRAMS (1 CAPFUL) BY MOUTH DAILY 4/12/17   Usman Hodgson PA-C   NOVOLOG FLEXPEN 100 UNIT/ML soln INJECT 20 UNITS SUBCUTANEOUSLY THREE TIMES A DAY (WITH MEALS), INJECT AN EXTRA 7 UNITS ONCE DAILY FOR BLOOD SUGAR OVER 500 4/12/17   Usman Hodgson PA-C   vitamin D3 (CHOLECALCIFEROL) 98140 UNITS capsule Take 1 capsule (50,000 Units) by mouth every 7 days 3/30/17   America Mccarty MD   ULTICARE MINI 31G X 6 MM insulin pen needle USE 4 DAILY OR AS DIRECTED 3/20/17   Moraima Da Silva MD   LANTUS SOLOSTAR 100 UNIT/ML soln INJECT 45 UNITS SUBCUTANEOUSLY EVERY NIGHT AT BEDTIME 3/20/17   Moraima Da Silva MD   aspirin (ASPIRIN LOW DOSE) 81 MG EC tablet Take 1 tablet (81 mg) by mouth daily  Patient taking differently: Take 81 mg by mouth every morning  2/23/17   Usman Hodgson PA-C   omeprazole (PRILOSEC) 40 MG capsule Take 1 capsule (40 mg) by mouth daily  Patient taking differently: Take 40 mg by mouth every morning  2/23/17   Usman Hodgson PA-C   acetaminophen (TYLENOL) 500 MG tablet Take 2 tablets (1,000 mg) by mouth every 6 hours as needed for pain  Patient not taking: Reported on 4/21/2017 11/30/16   Jud Real PA-C   ibuprofen (ADVIL,MOTRIN) 600 MG tablet Take 1 tablet (600 mg) by mouth every 4 hours as needed for moderate pain 11/30/16   Jud Real PA-C   melatonin 5 MG CAPS Take 1 capsule by mouth daily At dinnertime 11/28/16   Jeffrey King,  BRANDON Renner   glucose 4 G CHEW Take 2 every 15 minutes for blood sugar <70mg/dL. Recheck blood sugar every 15 minutes until above 70mg/dL, then eat a substantial meal. 11/18/16   Jud Real PA-C   order for DME Hold Novolog if meals are refused. 11/18/16   Jud Real PA-C   blood glucose monitoring (ONE TOUCH DELICA) lancets Use to test blood sugar 2 times daily or as directed.  Ok to substitute alternative if insurance prefers. 11/7/16   Jud Real PA-C   citalopram (CELEXA) 20 MG tablet Take 1 tablet (20 mg) by mouth daily 11/2/16   Jud Real PA-C   haloperidol (HALDOL) 5 MG tablet Take 1 tablet (5 mg) by mouth At Bedtime 10/4/16   Jud Real PA-C   benztropine (COGENTIN) 1 MG tablet Take 1 tablet (1 mg) by mouth 2 times daily 8/24/16   Jud Real PA-C   order for DME Equipment being ordered: Rolling walker 8/18/16   Jud Real PA-C   senna-docusate (SENOKOT-S;PERICOLACE) 8.6-50 MG per tablet Take 1 tablet by mouth 2 times daily as needed for constipation 8/10/16   Jud Real PA-C     No current Epic-ordered facility-administered medications on file.      No current [facility]-ordered outpatient prescriptions on file.     Wt Readings from Last 1 Encounters:   04/21/17 100.7 kg (222 lb)     Temp Readings from Last 1 Encounters:   04/21/17 37.2  C (98.9  F) (Oral)     BP Readings from Last 6 Encounters:   04/21/17 102/58   04/05/17 133/76   03/16/17 163/89   03/09/17 115/77   03/08/17 97/57   03/03/17 150/79     Pulse Readings from Last 4 Encounters:   04/21/17 84   04/05/17 82   03/16/17 90   03/09/17 91     Resp Readings from Last 1 Encounters:   04/21/17 14     SpO2 Readings from Last 1 Encounters:   04/21/17 96%     Recent Labs   Lab Test  01/24/17 1957 01/22/17   1936   NA  141  139   POTASSIUM  4.6  4.1   CHLORIDE  108  104   CO2  26  26   ANIONGAP  7  9   GLC  93  125*   BUN  16  17   CR  0.94  0.68   ALLEN  8.6  9.2      Recent Labs   Lab Test  01/24/17 1957 01/22/17   1936   WBC  7.1  6.9   HGB  10.7*  11.0*   PLT  223  217     Recent Labs   Lab Test  11/11/16 2234 08/29/16   2235   INR  Unsatisfactory specimen - tube underfilled  SPOKE WITH DR VARELA [id number], LY    0.86      RECENT LABS:   ECG:   ECHO:   CXR:                    .

## 2017-05-05 NOTE — ANESTHESIA CARE TRANSFER NOTE
Patient: Nena Tang    Procedure(s):  LEFT EYE PHACOEMULSIFICATION CLEAR CORNEA WITH STANDARD INTRAOCULAR LENS IMPLANT  - Wound Class: I-Clean    Diagnosis: LEFT EYE CATARACT  Diagnosis Additional Information: No value filed.    Anesthesia Type:   MAC     Note:  Airway :Room Air  Patient transferred to:PACU        Vitals: (Last set prior to Anesthesia Care Transfer)    CRNA VITALS  5/5/2017 1056 - 5/5/2017 1129      5/5/2017             Resp Rate (set): 10                Electronically Signed By: ART Byers CRNA  May 5, 2017  11:29 AM

## 2017-05-05 NOTE — PROGRESS NOTES
POD#0, status post cataract surgery, left eye    No complaints.  Denies eye pain.    Impression/Plan:  Pseudophakia, OS: POD0, good post-operative appearance. IOP reasonable.    - Levofloxacin 4x daily in the surgical eye for 1 week  - Prednisolone 4x daily in the surgical eye for 1 week, then weekly taper      Eye protection at all times and eye shield at night for 1 week.    Limited activities with no exercise or heavy lifting for 1 week.    Instructed patient to contact us for decreasing vision, eye pain, new floaters or flashes of light or other concerning symptoms.    Written instructions given    Return to clinic 3-4 weeks with refraction and dilation.

## 2017-05-05 NOTE — IP AVS SNAPSHOT
[facility]    [address]    [place] [address]    Phone:  [phone]    Fax:  [phone]                                       After Visit Summary   5/5/2017    Nena Tang    MRN: [id number]           After Visit Summary Signature Page     I have received my discharge instructions, and my questions have been answered. I have discussed any challenges I see with this plan with the nurse or doctor.    ..........................................................................................................................................  Patient/Patient Representative Signature      ..........................................................................................................................................  Patient Representative Print Name and Relationship to Patient    ..................................................               ................................................  Date                                            Time    ..........................................................................................................................................  Reviewed by Signature/Title    ...................................................              ..............................................  Date                                                            Time

## 2017-05-05 NOTE — IP AVS SNAPSHOT
MRN:[id number]                      After Visit Summary   5/5/2017    Nena Tang    MRN: [id number]           Thank you!     Thank you for choosing [facility] for your care. Our goal is always to provide you with excellent care. Hearing back from our patients is one way we can continue to improve our services. Please take a few minutes to complete the written survey that you may receive in the mail after you visit with us. Thank you!        Patient Information     Date Of Birth          1960        About your hospital stay     You were admitted on:  May 5, 2017 You last received care in the:  [facility]    You were discharged on:  May 5, 2017       Who to Call     For medical emergencies, please call 911.  For non-urgent questions about your medical care, please call your primary care provider or clinic, None  For questions related to your surgery, please call your surgery clinic        Attending Provider     Provider Specialty    Tyra Diaz MD Ophthalmology       Primary Care Provider    None Specified       No primary provider on file.        After Care Instructions     Eye drops as prescribed by physician.  Start drops today unless told otherwise by the physician           May use Tylenol or Advil for pain as directed by the physician           Notify Physician if you have severe headache or nausea           Remove patch per physician instruction           Return to clinic as instructed by physician           Wear eye shield or patch as directed                 Your next 10 appointments already scheduled     May 18, 2017 10:30 AM CDT   Return Sleep Patient with William Eng MD   [facility] ([facility])    [address] [address] [address]   [phone]            May 25, 2017 10:15 AM CDT   RETURN RETINA with Tiburcio Chacon MD   Eye Clinic ([facility])    [address]  [address]  [facility] [address]   [phone]            Jun 02, 2017  9:30 AM CDT   Return Visit with Usman Hodgson PA-C   [facility] ([facility])    [address]  [address]  [facility] [address]   [phone]            Jun 05, 2017  2:00 PM CDT   Post-Op with Tyra Diaz MD   Eye Clinic ([facility])    [address]  [address]  [address]  [facility] [address]   [phone]              Further instructions from your care team       Dr. Tyra Diaz  [facility]  [phone]  Post Operative Cataract Instructions      If you only have a clear eye shield on, you may remove the eye shield on arrival home and begin eye drops today as directed by Dr. Diaz.      Wear the clear eye shield when sleeping for protection for 5 days.      Do not rub the operated eye.      Light sensitivity may be noticed. Sunglasses may be worn for comfort.      Some discomfort and irritation may be noticed. Acetaminophen (Tylenol) or Ibuprofen (Advil) may be taken for discomfort. If pain persists please call Dr. Diaz's office.      Keep the operated eye dry. You may wash your hair, bathe or shower, but keep the operated eye closed while doing so.       Bring your prescribed eye drops with you to your scheduled post-operative appointment.      Use medication exactly as prescribed by your doctor. You may restart your regular home medications.       Call Dr. Diaz's office at [phone] if any of the following should occur:    - Any sudden vision changes, including decreased vision  - Nausea or severe headache  - Increase in pain not controlled  - Signs of infection (pus, increasing redness or tenderness)  - Severe sensitivity to light    [facility] Discharge  Instructions  Anesthesia ([facility])   Adult Discharge  Instructions    For 24 hours after surgery    1. Get plenty of rest.  Make arrangements to have a responsible adult stay with you for at least 6 hours after you leave the hospital.  2. Do not drive or use heavy equipment for 24 hours.    3. Do not drink alcohol for 24 hours.  4. Do not sign legal documents or make important decisions for 24 hours.  5. Avoid strenuous or risky activities. You may feel lightheaded.  If so, sit for a few minutes before standing.  Have someone help you get up.   6. Conscious sedation patients may resume a regular diet..  7. Any questions of medical nature, call your physician.    Pending Results     No orders found from 5/3/2017 to 5/6/2017.            Admission Information     Date & Time Provider Department Dept. Phone    5/5/2017 Tyra Diaz MD [facility] [phone]      Your Vitals Were     Blood Pressure Temperature Respirations Height Weight Last Period    105/65 97.2  F (36.2  C) (Temporal) 16 1.524 m (5') 100.7 kg (222 lb) 01/06/2015    Pulse Oximetry BMI (Body Mass Index)                94% 43.36 kg/m2          MyChart Information     [employer] gives you secure access to your electronic health record. If you see a primary care provider, you can also send messages to your care team and make appointments. If you have questions, please call your primary care clinic.  If you do not have a primary care provider, please call [phone] and they will assist you.        Care EveryWhere ID     This is your Care EveryWhere ID. This could be used by other organizations to access your [place] medical records  [id number]           Review of your medicines      UNREVIEWED medicines. Ask your doctor about these medicines        Dose / Directions    acetaminophen 500 MG tablet   Commonly known as:  TYLENOL   Used for:  Closed fracture of one rib of right side, initial encounter        Dose:  1000 mg   Take 2 tablets (1,000 mg) by mouth every 6 hours as needed for  pain   Quantity:  100 tablet   Refills:  0       aspirin 81 MG EC tablet   Commonly known as:  ASPIRIN LOW DOSE   Used for:  Coronary artery disease involving native heart with angina pectoris, unspecified vessel or lesion type (H)        Dose:  81 mg   Take 1 tablet (81 mg) by mouth daily   Quantity:  100 tablet   Refills:  4       atorvastatin 80 MG tablet   Commonly known as:  LIPITOR   Used for:  Hyperlipidemia LDL goal <100        TAKE 1 TABLET (80MG) BY MOUTH DAILY   Quantity:  60 tablet   Refills:  1       benztropine 1 MG tablet   Commonly known as:  COGENTIN   Used for:  Tardive dyskinesia        Dose:  1 mg   Take 1 tablet (1 mg) by mouth 2 times daily   Quantity:  60 tablet   Refills:  3       citalopram 20 MG tablet   Commonly known as:  celeXA   Used for:  Schizoaffective disorder, depressive type (H)        Dose:  20 mg   Take 1 tablet (20 mg) by mouth daily   Quantity:  30 tablet   Refills:  2       gabapentin 300 MG capsule   Commonly known as:  NEURONTIN   Used for:  Type 2 diabetes mellitus with diabetic neuropathy, with long-term current use of insulin (H)        TAKE 2 CAPSULES (600MG) BY MOUTH THREE TIMES A DAY   Quantity:  168 capsule   Refills:  3       glucose 4 G Chew chewable tablet   Used for:  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, with long-term current use of insulin, unspecified laterality (H)        Take 2 every 15 minutes for blood sugar <70mg/dL. Recheck blood sugar every 15 minutes until above 70mg/dL, then eat a substantial meal.   Quantity:  20 tablet   Refills:  1       haloperidol 5 MG tablet   Commonly known as:  HALDOL   Used for:  Schizoaffective disorder, depressive type (H)        Dose:  5 mg   Take 1 tablet (5 mg) by mouth At Bedtime   Quantity:  30 tablet   Refills:  2       ibuprofen 600 MG tablet   Commonly known as:  ADVIL/MOTRIN   Used for:  Closed fracture of one rib of right side, initial encounter        Dose:  600  mg   Take 1 tablet (600 mg) by mouth every 4 hours as needed for moderate pain   Quantity:  60 tablet   Refills:  0       LANTUS SOLOSTAR 100 UNIT/ML injection   Used for:  Type 2 diabetes mellitus with both eyes affected by mild nonproliferative retinopathy without macular edema, with long-term current use of insulin (H)   Generic drug:  insulin glargine        INJECT 45 UNITS SUBCUTANEOUSLY EVERY NIGHT AT BEDTIME   Quantity:  15 mL   Refills:  3       levofloxacin 0.5 % ophthalmic solution   Commonly known as:  QUIXIN   Used for:  Cataract, left        1 drop in surgical eye as directed - 4x daily for 1 week, then stop   Quantity:  2 Bottle   Refills:  1       losartan 100 MG tablet   Commonly known as:  COZAAR   Used for:  Coronary artery disease involving native heart with angina pectoris, unspecified vessel or lesion type (H)        TAKE 1 TABLET (100MG) BY MOUTH DAILY   Quantity:  90 tablet   Refills:  1       melatonin 5 MG Caps   Used for:  Insomnia, unspecified type        Dose:  1 capsule   Take 1 capsule by mouth daily At dinnertime   Quantity:  90 capsule   Refills:  1       metoprolol 100 MG 24 hr tablet   Commonly known as:  TOPROL-XL   Used for:  Coronary artery disease involving native heart with angina pectoris, unspecified vessel or lesion type (H)        TAKE 1 TABLET (100MG) BY MOUTH DAILY   Quantity:  60 tablet   Refills:  1       NovoLOG FLEXPEN 100 UNIT/ML injection   Used for:  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, with long-term current use of insulin, unspecified laterality (H)   Generic drug:  insulin aspart        INJECT 20 UNITS SUBCUTANEOUSLY THREE TIMES A DAY (WITH MEALS), INJECT AN EXTRA 7 UNITS ONCE DAILY FOR BLOOD SUGAR OVER 500   Quantity:  15 mL   Refills:  3       omeprazole 40 MG capsule   Commonly known as:  priLOSEC   Used for:  Gastroesophageal reflux disease without esophagitis        Dose:  40 mg   Take 1 capsule (40 mg) by  mouth daily   Quantity:  30 capsule   Refills:  5       polyethylene glycol powder   Commonly known as:  MIRALAX/GLYCOLAX   Used for:  Constipation, unspecified constipation type        TAKE 17 GRAMS (1 CAPFUL) BY MOUTH DAILY   Quantity:  527 g   Refills:  3       prednisoLONE acetate 1 % ophthalmic susp   Commonly known as:  PRED FORTE   Used for:  Cataract, left        1 drop in surgical eye as directed, 4x daily after surgery for 1 week, 3x daily for 1 week, 2x daily for 1 week, daily for 1 week, then stop   Quantity:  2 Bottle   Refills:  1       senna-docusate 8.6-50 MG per tablet   Commonly known as:  SENOKOT-S;PERICOLACE   Used for:  Constipation, unspecified constipation type        Dose:  1 tablet   Take 1 tablet by mouth 2 times daily as needed for constipation   Quantity:  100 tablet   Refills:  1       TRULICITY 1.5 MG/0.5ML pen   Used for:  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, with long-term current use of insulin, unspecified laterality (H)   Generic drug:  dulaglutide        INJECT 1.5MG SUBCUTANEOUSLY EVERY SEVEN DAYS   Quantity:  2 mL   Refills:  11       vitamin D3 58516 UNITS capsule   Commonly known as:  CHOLECALCIFEROL   Used for:  Vitamin D deficiency        Dose:  57065 Units   Take 1 capsule (50,000 Units) by mouth every 7 days   Quantity:  3 capsule   Refills:  1         CONTINUE these medicines which have NOT CHANGED        Dose / Directions    blood glucose monitoring lancets   Used for:  Type 2 diabetes mellitus with diabetic neuropathy, with long-term current use of insulin (H)        Use to test blood sugar 2 times daily or as directed.  Ok to substitute alternative if insurance prefers.   Quantity:  1 Box   Refills:  prn       ONE TOUCH ULTRA test strip   Used for:  Type 2 diabetes mellitus with diabetic neuropathy, with long-term current use of insulin (H)   Generic drug:  blood glucose monitoring        TEST TWICE DAILY AS DIRECTED.    Quantity:  150 strip   Refills:  11       * order for DME   Used for:  Falls frequently        Equipment being ordered: Rolling walker   Quantity:  1 Device   Refills:  0       * order for DME   Used for:  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, with long-term current use of insulin, unspecified laterality (H)        Hold Novolog if meals are refused.   Quantity:  1 each   Refills:  0       ULTICARE MINI 31G X 6 MM   Used for:  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, with long-term current use of insulin, unspecified laterality (H)   Generic drug:  insulin pen needle        USE 4 DAILY OR AS DIRECTED   Quantity:  100 each   Refills:  11       * Notice:  This list has 2 medication(s) that are the same as other medications prescribed for you. Read the directions carefully, and ask your doctor or other care provider to review them with you.             Protect others around you: Learn how to safely use, store and throw away your medicines at www.disposemymeds.org.             Medication List: This is a list of all your medications and when to take them. Check marks below indicate your daily home schedule. Keep this list as a reference.      Medications           Morning Afternoon Evening Bedtime As Needed    acetaminophen 500 MG tablet   Commonly known as:  TYLENOL   Take 2 tablets (1,000 mg) by mouth every 6 hours as needed for pain   Last time this was given:  1,000 mg on 5/5/2017 11:46 AM                                aspirin 81 MG EC tablet   Commonly known as:  ASPIRIN LOW DOSE   Take 1 tablet (81 mg) by mouth daily                                atorvastatin 80 MG tablet   Commonly known as:  LIPITOR   TAKE 1 TABLET (80MG) BY MOUTH DAILY                                benztropine 1 MG tablet   Commonly known as:  COGENTIN   Take 1 tablet (1 mg) by mouth 2 times daily                                blood glucose  monitoring lancets   Use to test blood sugar 2 times daily or as directed.  Ok to substitute alternative if insurance prefers.                                citalopram 20 MG tablet   Commonly known as:  celeXA   Take 1 tablet (20 mg) by mouth daily                                gabapentin 300 MG capsule   Commonly known as:  NEURONTIN   TAKE 2 CAPSULES (600MG) BY MOUTH THREE TIMES A DAY                                glucose 4 G Chew chewable tablet   Take 2 every 15 minutes for blood sugar <70mg/dL. Recheck blood sugar every 15 minutes until above 70mg/dL, then eat a substantial meal.                                haloperidol 5 MG tablet   Commonly known as:  HALDOL   Take 1 tablet (5 mg) by mouth At Bedtime                                ibuprofen 600 MG tablet   Commonly known as:  ADVIL/MOTRIN   Take 1 tablet (600 mg) by mouth every 4 hours as needed for moderate pain                                LANTUS SOLOSTAR 100 UNIT/ML injection   INJECT 45 UNITS SUBCUTANEOUSLY EVERY NIGHT AT BEDTIME   Generic drug:  insulin glargine                                levofloxacin 0.5 % ophthalmic solution   Commonly known as:  QUIXIN   1 drop in surgical eye as directed - 4x daily for 1 week, then stop   Last time this was given:  1 drop on 5/5/2017 11:21 AM                                losartan 100 MG tablet   Commonly known as:  COZAAR   TAKE 1 TABLET (100MG) BY MOUTH DAILY                                melatonin 5 MG Caps   Take 1 capsule by mouth daily At dinnertime                                metoprolol 100 MG 24 hr tablet   Commonly known as:  TOPROL-XL   TAKE 1 TABLET (100MG) BY MOUTH DAILY                                NovoLOG FLEXPEN 100 UNIT/ML injection   INJECT 20 UNITS SUBCUTANEOUSLY THREE TIMES A DAY (WITH MEALS), INJECT AN EXTRA 7 UNITS ONCE DAILY FOR BLOOD SUGAR OVER 500   Generic drug:  insulin aspart                                omeprazole 40 MG capsule   Commonly known as:  priLOSEC   Take 1  capsule (40 mg) by mouth daily                                ONE TOUCH ULTRA test strip   TEST TWICE DAILY AS DIRECTED.   Generic drug:  blood glucose monitoring                                * order for DME   Equipment being ordered: Rolling walker                                * order for DME   Hold Novolog if meals are refused.                                polyethylene glycol powder   Commonly known as:  MIRALAX/GLYCOLAX   TAKE 17 GRAMS (1 CAPFUL) BY MOUTH DAILY                                prednisoLONE acetate 1 % ophthalmic susp   Commonly known as:  PRED FORTE   1 drop in surgical eye as directed, 4x daily after surgery for 1 week, 3x daily for 1 week, 2x daily for 1 week, daily for 1 week, then stop   Last time this was given:  1 drop on 5/5/2017 11:21 AM                                senna-docusate 8.6-50 MG per tablet   Commonly known as:  SENOKOT-S;PERICOLACE   Take 1 tablet by mouth 2 times daily as needed for constipation                                TRULICITY 1.5 MG/0.5ML pen   INJECT 1.5MG SUBCUTANEOUSLY EVERY SEVEN DAYS   Generic drug:  dulaglutide                                ULTICARE MINI 31G X 6 MM   USE 4 DAILY OR AS DIRECTED   Generic drug:  insulin pen needle                                vitamin D3 85777 UNITS capsule   Commonly known as:  CHOLECALCIFEROL   Take 1 capsule (50,000 Units) by mouth every 7 days                                * Notice:  This list has 2 medication(s) that are the same as other medications prescribed for you. Read the directions carefully, and ask your doctor or other care provider to review them with you.

## 2017-05-05 NOTE — PROCEDURES
Ophthalmology Post-op Procedure Note    Surgical Service:    Ophthalmology & Visual Sciences  Date Performed:      May 5, 2017  Location: [facility]      Pre-operative Diagnosis: Visually significant cataract, left eye  Post-operative Diagnosis:  Pseudophakia, left eye  Operative Procedure:  Phacoemulsification with intraocular lens implantation, left eye    Surgeon(s):  Fellow/Staff Surgeon:       Tyra Diaz MD  Resident Surgeon:            Honorio Tate MD    Anesthesia:   Topical/MAC  Findings:  No unusual findings   Blood Loss:    Minimal  Implants:  ZCB00 11.0 intraocular lens  Specimens:  None     Complications:  The patient did not experience any complications.   Condition: Stable    Operative Report Completion:    Description of Operation/Procedure:  After appropriate informed consent was obtained, the patient was brought to the operating room. The appropriate cardiac and blood pressure monitors were placed. A final pause occurred just before the start of the procedure during which the entire procedure team actively confirmed the correct patient, procedure, site, special equipment and special requirements. A few drops of 0.5% tetracaine were instilled onto the operative eye. The patient was prepped and draped in the usual sterile fashion using 5% povidone/iodine.     An eyelid speculum was placed to open the eyelids. A paracentesis port was placed approximately sixty degrees to the left of the planned temporal incision location using the sideport blade. Approximately 0.8 cc of 1% nonpreserved lidocaine was placed into the anterior chamber. The anterior chamber was filled with Viscoat viscoelastic. A clear corneal temporal incision was made with a metal 2.6 mm keratome. A round continuous tear capsulorhexis was initiated with a cystotome and completed with the Utrata forceps. Balanced salt solution on a cannula was used to perform hydrodissection. The nucleus was removed using  phacoemulsification with a chop technique. The remaining cortical material was removed using the irrigation/aspiration handpiece. The capsular bag was filled with Provisc. A 11.0 diopter ZCB00 intraocular lens was placed into the capsular bag using the YOVANY injection system. The remaining viscoelastic was removed using the irrigation aspiration handpiece. The paracentesis and temporal wounds were hydrated with balanced salt solution. At the conclusion of the case, the wounds were felt to be watertight, the pupil was round, the lens was centered, and the anterior chamber was deep. A few drops of levofloxacin and prednisolone were given to the operative eye. The eyelid speculum was removed. A shield was placed over the operative eye.      Attending Attestation:  I was present for and performed the entire procedure.  Tyra Diaz MD

## 2017-05-08 RX ORDER — CHOLECALCIFEROL (VITAMIN D3) 1250 MCG
CAPSULE ORAL
Qty: 4 CAPSULE | Refills: 0 | Status: SHIPPED | OUTPATIENT
Start: 2017-05-08 | End: 2017-05-30

## 2017-05-18 ENCOUNTER — OFFICE VISIT (OUTPATIENT)
Dept: SLEEP MEDICINE | Facility: CLINIC | Age: 57
End: 2017-05-18
Payer: MEDICARE

## 2017-05-18 VITALS
WEIGHT: 221.78 LBS | DIASTOLIC BLOOD PRESSURE: 79 MMHG | RESPIRATION RATE: 16 BRPM | HEART RATE: 85 BPM | HEIGHT: 60 IN | OXYGEN SATURATION: 95 % | BODY MASS INDEX: 43.54 KG/M2 | SYSTOLIC BLOOD PRESSURE: 118 MMHG

## 2017-05-18 DIAGNOSIS — G47.09 OTHER INSOMNIA: ICD-10-CM

## 2017-05-18 DIAGNOSIS — G47.9 SLEEP DIFFICULTIES: ICD-10-CM

## 2017-05-18 DIAGNOSIS — G47.33 OSA (OBSTRUCTIVE SLEEP APNEA): Primary | ICD-10-CM

## 2017-05-18 PROCEDURE — 99215 OFFICE O/P EST HI 40 MIN: CPT | Performed by: FAMILY MEDICINE

## 2017-05-18 NOTE — Clinical Note
This patient has obvious cognitive deficiencies with significant psychiatric conditions. She is getting a new interface to help her compliance, but I am still concern she might lose the device. Please, help me make sure that she is using the PAP device. Monitor usage closely. She will follow up with me within 5 weeks.  Please, Dr De Los Santos, close this encounter for me. Documentation and coding have been completed and placed.  Thank you everyone!

## 2017-05-18 NOTE — PATIENT INSTRUCTIONS

## 2017-05-18 NOTE — MR AVS SNAPSHOT
After Visit Summary   5/18/2017    Nena Tang    MRN: [id number]           Patient Information     Date Of Birth          1960        Visit Information        Provider Department      5/18/2017 10:30 AM [place] Eng [place] [facility] [place]      Your BMI is Body mass index is 43.31 kg/(m^2).  Weight management is a personal decision.  If you are interested in exploring weight loss strategies, the following discussion covers the approaches that may be successful. Body mass index (BMI) is one way to tell whether you are at a healthy weight, overweight, or obese. It measures your weight in relation to your height.  A BMI of 18.5 to 24.9 is in the healthy range. A person with a BMI of 25 to 29.9 is considered overweight, and someone with a BMI of 30 or greater is considered obese. More than two-thirds of American adults are considered overweight or obese.  Being overweight or obese increases the risk for further weight gain. Excess weight may lead to heart disease and diabetes.  Creating and following plans for healthy eating and physical activity may help you improve your health.  Weight control is part of healthy lifestyle and includes exercise, emotional health, and healthy eating habits. Careful eating habits lifelong are the mainstay of weight control. Though there are significant health benefits from weight loss, long-term weight loss with diet alone may be very difficult to achieve- studies show long-term success with dietary management in less than 10% of people. Attaining a healthy weight may be especially difficult to achieve in those with severe obesity. In some cases, medications, devices and surgical management might be considered.  What can you do?  If you are overweight or obese and are interested in methods for weight loss, you should discuss this with your provider.     Consider reducing daily calorie intake by 500 calories.     Keep a  food journal.     Avoiding skipping meals, consider cutting portions instead.    Diet combined with exercise helps maintain muscle while optimizing fat loss. Strength training is particularly important for building and maintaining muscle mass. Exercise helps reduce stress, increase energy, and improves fitness. Increasing exercise without diet control, however, may not burn enough calories to loose weight.       Start walking three days a week 10-20 minutes at a time    Work towards walking thirty minutes five days a week     Eventually, increase the speed of your walking for 1-2 minutes at time    In addition, we recommend that you review healthy lifestyles and methods for weight loss available through the National Institutes of Health patient information sites:  http://win.niddk.nih.gov/publications/index.htm    And look into health and wellness programs that may be available through your health insurance provider, employer, local community center, or [employer] club.    Weight management plan: Patient was referred to their PCP to discuss a diet and exercise plan.    Go to the 2nd floor today and get the new mask. After obtaining a new mask, return here and meet with Loreta. Follow up with me within 4 weeks.    Thanks!    William Leon MD, MPH        Follow-ups after your visit        Your next 10 appointments already scheduled     May 25, 2017 10:15 AM CDT   RETURN RETINA with Tiburcio Chacon MD   [facility] ([facility])    Kiet Betts  [address]  [place] [facility] [address]   [phone]            Jun 02, 2017  9:30 AM CDT   Return Visit with Usman Hodgson PA-C   [facility] ([facility])    [address]  [address]  [place] [address]   [phone]            Jun 05, 2017  2:00 PM CDT   Post-Op with Tyra Diaz MD   [facility] Clinic ([facility])    Kiet Betts  [address]  [place] [address] [phone]              Who to contact     If you have questions or need follow up information about today's clinic visit or your schedule please contact [facility] directly at [phone].  Normal or non-critical lab and imaging results will be communicated to you by MyChart, letter or phone within 4 business days after the clinic has received the results. If you do not hear from us within 7 days, please contact the clinic through [employer] or phone. If you have a critical or abnormal lab result, we will notify you by phone as soon as possible.  Submit refill requests through [employer] or call your pharmacy and they will forward the refill request to us. Please allow 3 business days for your refill to be completed.          Additional Information About Your Visit        MyChart Information     [employer] gives you secure access to your electronic health record. If you see a primary care provider, you can also send messages to your care team and make appointments. If you have questions, please call your primary care clinic.  If you do not have a primary care provider, please call [phone] and they will assist you.        Care EveryWhere ID     This is your Care EveryWhere ID. This could be used by other organizations to access your [facility] medical records  [id number]        Your Vitals Were     Pulse Respirations Height Last Period Pulse Oximetry BMI (Body Mass Index)    85 16 1.524 m (5') 01/06/2015 95% 43.31 kg/m2       Blood Pressure from Last 3 Encounters:   05/18/17 118/79   05/05/17 108/72   04/21/17 102/58    Weight from Last 3 Encounters:   05/18/17 100.6 kg (221 lb 12.5 oz)   05/05/17 100.7 kg (222 lb)   04/21/17 100.7 kg (222 lb)              Today, you had the following     No orders found for display         Today's Medication Changes          These changes are accurate as of: 5/18/17 10:31 AM.  If you have any  questions, ask your nurse or doctor.               These medicines have changed or have updated prescriptions.        Dose/Directions    omeprazole 40 MG capsule   Commonly known as:  priLOSEC   This may have changed:  when to take this   Used for:  Gastroesophageal reflux disease without esophagitis        Dose:  40 mg   Take 1 capsule (40 mg) by mouth daily   Quantity:  30 capsule   Refills:  5                Primary Care Provider    None Specified       No primary provider on file.        Goals        General    Medical (pt-stated)     Notes - Note created  7/7/2016  9:15 AM by Chelsea Pulido RN    Get blood sugars under control    Improve medication compliance    As of today's date 7/7/2016 goal is met at 0 - 25%.   Goal Status:  Active          Thank you!     Thank you for choosing [facility]  for your care. Our goal is always to provide you with excellent care. Hearing back from our patients is one way we can continue to improve our services. Please take a few minutes to complete the written survey that you may receive in the mail after your visit with us. Thank you!             Your Updated Medication List - Protect others around you: Learn how to safely use, store and throw away your medicines at www.disposemymeds.org.          This list is accurate as of: 5/18/17 10:31 AM.  Always use your most recent med list.                   Brand Name Dispense Instructions for use    acetaminophen 500 MG tablet    TYLENOL    100 tablet    Take 2 tablets (1,000 mg) by mouth every 6 hours as needed for pain       aspirin 81 MG EC tablet    ASPIRIN LOW DOSE    100 tablet    Take 1 tablet (81 mg) by mouth daily       atorvastatin 80 MG tablet    LIPITOR    60 tablet    TAKE 1 TABLET (80MG) BY MOUTH DAILY       benztropine 1 MG tablet    COGENTIN    60 tablet    Take 1 tablet (1 mg) by mouth 2 times daily       blood glucose monitoring lancets     1 Box    Use to test blood sugar 2 times  daily or as directed.  Ok to substitute alternative if insurance prefers.       citalopram 20 MG tablet    celeXA    30 tablet    Take 1 tablet (20 mg) by mouth daily       gabapentin 300 MG capsule    NEURONTIN    168 capsule    TAKE 2 CAPSULES (600MG) BY MOUTH THREE TIMES A DAY       glucose 4 G Chew chewable tablet     20 tablet    Take 2 every 15 minutes for blood sugar <70mg/dL. Recheck blood sugar every 15 minutes until above 70mg/dL, then eat a substantial meal.       haloperidol 5 MG tablet    HALDOL    30 tablet    Take 1 tablet (5 mg) by mouth At Bedtime       ibuprofen 600 MG tablet    ADVIL/MOTRIN    60 tablet    Take 1 tablet (600 mg) by mouth every 4 hours as needed for moderate pain       LANTUS SOLOSTAR 100 UNIT/ML injection   Generic drug:  insulin glargine     15 mL    INJECT 45 UNITS SUBCUTANEOUSLY EVERY NIGHT AT BEDTIME       levofloxacin 0.5 % ophthalmic solution    QUIXIN    2 Bottle    1 drop in surgical eye as directed - 4x daily for 1 week, then stop       losartan 100 MG tablet    COZAAR    90 tablet    TAKE 1 TABLET (100MG) BY MOUTH DAILY       melatonin 5 MG Caps     90 capsule    Take 1 capsule by mouth daily At dinnertime       metoprolol 100 MG 24 hr tablet    TOPROL-XL    60 tablet    TAKE 1 TABLET (100MG) BY MOUTH DAILY       NovoLOG FLEXPEN 100 UNIT/ML injection   Generic drug:  insulin aspart     15 mL    INJECT 20 UNITS SUBCUTANEOUSLY THREE TIMES A DAY (WITH MEALS), INJECT AN EXTRA 7 UNITS ONCE DAILY FOR BLOOD SUGAR OVER 500       omeprazole 40 MG capsule    priLOSEC    30 capsule    Take 1 capsule (40 mg) by mouth daily       ONE TOUCH ULTRA test strip   Generic drug:  blood glucose monitoring     150 strip    TEST TWICE DAILY AS DIRECTED.       * order for DME     1 Device    Equipment being ordered: Rolling walker       * order for DME     1 each    Hold Novolog if meals are refused.       polyethylene glycol powder    MIRALAX/GLYCOLAX    527 g    TAKE 17 GRAMS (1 CAPFUL) BY  MOUTH DAILY       prednisoLONE acetate 1 % ophthalmic susp    PRED FORTE    2 Bottle    1 drop in surgical eye as directed, 4x daily after surgery for 1 week, 3x daily for 1 week, 2x daily for 1 week, daily for 1 week, then stop       senna-docusate 8.6-50 MG per tablet    SENOKOT-S;PERICOLACE    100 tablet    Take 1 tablet by mouth 2 times daily as needed for constipation       TRULICITY 1.5 MG/0.5ML pen   Generic drug:  dulaglutide     2 mL    INJECT 1.5MG SUBCUTANEOUSLY EVERY SEVEN DAYS       ULTICARE MINI 31G X 6 MM   Generic drug:  insulin pen needle     100 each    USE 4 DAILY OR AS DIRECTED       vitamin D3 78117 UNITS capsule    CHOLECALCIFEROL    4 capsule    TAKE 1 CAPSULE (50,000 UNITS) BY MOUTH ONCE A WEEK       * Notice:  This list has 2 medication(s) that are the same as other medications prescribed for you. Read the directions carefully, and ask your doctor or other care provider to review them with you.

## 2017-05-18 NOTE — PROGRESS NOTES
Sleep Center [facility]  Outpatient Sleep Medicine Follow Up Visit  March 9, 2017           Name: Nena Tang  MRN# [id number]    Age: 56 year old  YOB: 1960          Date of Consultation: March 9, 2017  Consultation is requested by: BRANDON Cooley [facility]  [address] [place] [address] [address] [place] [id number]  Primary care provider: Jud Real     Patient is accompanied by: Patient presents with caregiver today        Reason for Sleep Consult:       Nena Tang is a 56 year old female patient that presents here for a follow up visit of CINDY on CPAP.          Assessment and Plan:       Summary Sleep Diagnoses:     (1) CINDY  (2) Insomnia     Summary Recommendations:     (1) CINDY: This patient has a previously diagnosed case of CINDY categorized as mild with an AHI of 10.3 events per hour. She was started several years ago on Auto-CPAP and this was effective. However, the patient stopped using it over 2 yrs ago. Pt underwent a PSG sleep study that demonstrated severe CINDY (AHI of 62.5 events per hour). CPAP titration was completed and this was adequate for the condition. The patient was placed on an Auto-titration CPAP at minimum pressure of 12 cmH2O and maximum pressure of 15 cmH2O. Pt has not been compliant with the PAP use for unknown reasons. The patient states that the interface may be a reason. As such, a new interphase was ordered during the last visit. However, the patient did not qualify for a new interface. She is getting the new interface today (she has an appointment schedule for today with DME). After getting the new mask, she will spend time with Loreta and myself for mask and PAP use training. CPAP compliance was discussed with and explained to the patient and caregiver once again today. The importance of the PAP use was explained and emphasized today again. Pt will return for a follow up visit within 4 weeks. Pt will give us a call if she is not able to  use the CPAP or anything disturbs her sleep in the interim. This patient should be closely followed by the [facility] program. I am concerning that this patient, with obvious cognitive difficulties and psychiatric conditions, will lose the PAP device due to none compliance. I explained this to the care giver today. Lifestyle changes including exercises and diet were discussed.     (2) Insomnia: This patient had a poor hygiene with also significant psychiatric co-morbidities. Sleep hygiene and relaxation techniques were discussed today again to reinforced its importance. Some concepts of CBTI were discussed today. Pt is currently using melatonin as a sleeping aid and this will be continued for now. Today, he explains that he is feeling that the recommendations and sleep regimen is helping..     Coding:  (G47.33) CINDY (obstructive sleep apnea)  (G47.9) Sleeping difficulties  (G47.09) Other insomnia     Counseling included a comprehensive review of diagnostic and therapeutic strategies as well as risks of inadequate therapy.     Educational materials provided in instructions.     All questions and concerns were addressed today. Pt agrees and understands the assessment and plan.          History of Present Illness:       Nena Tang is a 56 year old Black  LHD female pt with history of DM type 2, irritable bowel syndrome, cardiomyopathy, CAD with stent placement, and HTN presents for a follow up visit today for CINDY currently on CPAP. During the initial encounter, the patient explained that she was diagnosed with CINDY several years ago and she was supposed to be using a CPAP. However, she stopped using the CPAP after her  passed away (pt lost interest in using it). The patient endorsed snoring, gasping / choking for air, and having witnessed apneas. She also reported unrestorative sleep. Pt explained that the experienced nocturnal dyspnea with frequent arousals due to gasping and chocking. Pt  explained that she spent a night in the hospital recently and she used a CPAP in the hospital and she was able to sleep perfectly. The patient otherwise denies any morning cephalgia, GERD sxs, nocturia, morning myalgias, or any other sleep related sxs.     The patient was evaluated in 02/2012 by Dr Flowers due to EDS and snoring. Patient underwent a PSG sleep study on 03/01/2012 to evaluate for a SDB. During that study, the pt had a mild case of CINDY with an AHI of 10.3 events per hour. The condition was more prominent during REM and supine sleep. Throughout the study, the pt slept for 396.5 minutes with a sleep latency of 10 minutes after using zolpidem and Percocet. Given that she was symptomatic, she was placed on an Auto-CPAP machine with minimum pressure of 5 cmH2O and maximum pressure of 15 cmH2O. During the last visit, the pt was being compliant with CPAP use and the CPAP pressures were adjusted to a minimum of 6 cmH2O and a maximum of 12 cmH2O. Pt explains that since the diagnosis she gained over 10 lbs.     Pt explains that when she was using the CPAP, she was able to sleep well. She explains that when using the CPAP no snoring, EDS, or witnessed apneas were noted.     This patient underwent an in-lab PSG sleep study that demonstrated severe CINDY with an AHI of 62.5 events per hour. She also had sleep-related hypoxemia. The condition was adequately titrated with a CPAP at maximum pressure of 13 cmH2O (no REM supine during final pressure) with a residual AHI of 7.1 events per hour. Pt explains that after using the CPAP during the sleep study, she felt more refreshed and rested in the morning. She following day, she explains that she felt better.     As during the last visit with me, the pt has been using an Auto-titrating PAP device with minimum pressure of 12 cmH2O and maximum pressure of 15 cmH2O. Once again, the CPAP download shows a compliance of only 33.3% with the patient using the device of less than 4  hours 90% of the time. Pt shows some large leakage. Residual AHI is 9.3 events per hour. The CPAP mean pressure is 12.8 cmH2O. Pt has been using a full face interface. Although a new face interface was ordered, she was not able to get a new mask because she did not qualify. However, she is allow to obtain a new mask today. The patient explains that the is not sure why she stopped using the PAP device. Today, she explains that when using the device she feels more rested and wake with no snoring, gasping / choking for air, unrestorative sleep, EDS, or any other sxs. Pt states that she likes the CPAP and that she will use it as instructed. When asked about the leakage noted, she states that she is not sure if the mask bothers her and that's shy she stopped using the machine (this is a patient with some level of cognitive impairment that presents with caregiver). Pt assures me today that with the new mask, she is sure she will be using it. Pt explains today to me that the nocturnal gasping / choking as well as the snoring is worsening when not using the PAP device.   The caregiver, who is here with us again today, explained that the patient refused to use the PAP device at night. The patient gets even awaken by the caring personnel, but she still refuses to use it.  Pt tells me today that her insomnia is well controlled with the recommendations given. She has incorporated some sleep hygiene given.  PREVIOUS IN- LAB or HOME SLEEP STUDIES: Location System              Date: Study done in 2012              TYPE: In-lab PSG study              AHI: 10.3 events per hour              Intervention: Auto-CPAP with minium pressure of 5 cmH2O and maximum pressure of 15 cmH2O     SLEEP-WAKE SCHEDULE:       Nenadell Tang                  -Describes herself as a night person; prefers to go to sleep at 11:00 PM and wakes up at 9:00 AM.                  -Naps 5-6 times per week for 120-180 minutes, feels unrefreshed after naps;  takes some inadvertent naps.                  -ON WEEKDAYS, goes to sleep at 10:00 PM during the week; awakens  8:00 AM with an alarm; falls asleep in about 2 hours; has difficulty falling asleep. Pt uses melatonin for sleeping.                  -ON WEEKENDS, goes to sleep at 12:00 AM and wakes up at 11:00 AM with an alarm; falls asleep in over 2 hours.                    -Awakens 2-4 times a night for 60 minutes before falling back to sleep; awakens to uncertain reasons (maybe dreams).       BEDTIME ACTIVITIES AND SHIFT WORK:     Nena Tang                 -Bedtime Activities and Other Sleeping Information: [facility] in a [facility]. Pt has her own bedroom. Pt watches TV in bed. Otherwise, no other activities reported in bed. However, the patient enjoys eating her mails most of the time at night time.                  -Occupation: Pt is not currently working.       SCALES              SLEEP APNEA: Stopbang score: 7 / 8              SLEEPINESS: [place] sleepiness scale (ESS):  11 / 24 (initial score)                          Drowsy driving / near accidents: Pt does not drive     SLEEP COMPLAINTS:  Cardio-respiratory                -Snoring: Snores very loud                -Dyspnea: Pt admits having witnessed apneas              -Morning headaches or confusion: Denies any morning cephalgia              -Coexisting Lung disease: Denies diagnosed or known lung disease at this time                                   -Coexisting Heart disease: Pt was diagnosed recently with heart valvular disease. Pt also had a MI about 6 yrs ago.                                   -Does patient have a bed partner: Patient sleeps alone              -Has bed partner been sleeping separately because of snoring:  N/A     RLS Screen:                  -When you try to relax in the evening or sleep at night, do you ever have unpleasant, restless feelings in your legs that can be relieved by walking or movement? Yes. Pt  explains that she moves her legs quite a bit during the day. She explains that, although she moves her legs during the day, this does not keep her up at night time.                 -Periodic limb movement: None reported     Narcolepsy:          - Denies sudden urges of sleep attacks        - Denies cataplexy        - Denies sleep paralysis          - Admits hallucinations. When going to sleep, pt reports some visual hallucinations (someone staring over her).      Sleep Behaviors:        - Denies leg symptoms/movements        - Denies motor restlessness        - Denies night terrors        - Denies bruxism        - Denies automatic behaviors     Other Subjective Complaints:        - Denies anxiety or rumination          - Denies pain and discomfort at night        - Denies waking up with heart pounding or racing        - Denies GERD or aspiration     Parasomnia:               -NREM - Denies recurrent persistent confusional arousal, night eating, sleep walking or sleep terrors                   -REM  - Denies dream enactment; No injuries reported while sleeping.      -Driving Accident or Near Accidents: Pt does not drive          Medications:               Current Outpatient Prescriptions    Medication  Sig       insulin aspart (NOVOLOG PEN) 100 UNIT/ML soln  Inject 20 Units Subcutaneous 3 times daily (with meals) Inject an extra 7 units once daily for blood sugars over 500mg/dL. Call the clinic if recheck is over 500mg/dL.       acetaminophen (TYLENOL) 500 MG tablet  Take 2 tablets (1,000 mg) by mouth every 6 hours as needed for pain       ibuprofen (ADVIL,MOTRIN) 600 MG tablet  Take 1 tablet (600 mg) by mouth every 4 hours as needed for moderate pain       diphenhydrAMINE (BENADRYL) 25 MG tablet  Take 1 tablet (25 mg) by mouth every 6 hours as needed for itching or allergies       diphenhydrAMINE (BENADRYL) 25 MG tablet  Take 1 tablet (25 mg) by mouth every 6 hours as needed for itching or allergies       melatonin  5 MG CAPS  Take 1 capsule by mouth daily At dinnertime       glucose 4 G CHEW  Take 2 every 15 minutes for blood sugar <70mg/dL. Recheck blood sugar every 15 minutes until above 70mg/dL, then eat a substantial meal.       order for DME  Hold Novolog if meals are refused.       insulin glargine (LANTUS) 100 UNIT/ML PEN  Inject 45 Units Subcutaneous At Bedtime       insulin pen needle 31G X 6 MM  Use 4 daily or as directed.       blood glucose monitoring (ONE TOUCH ULTRA) test strip  Check blood sugar 2 times a day as directed, One Touch Ultra Blue Test Strips Please match with Pt's Insurance and meter.       blood glucose monitoring (ONE TOUCH DELICA) lancets  Use to test blood sugar 2 times daily or as directed.  Ok to substitute alternative if insurance prefers.       citalopram (CELEXA) 20 MG tablet  Take 1 tablet (20 mg) by mouth daily       cholecalciferol (VITAMIN D3) 75277 UNITS capsule  Take 1 capsule (50,000 Units) by mouth once a week FOR LOW VITAMIN D       glipiZIDE (GLIPIZIDE XL) 5 MG 24 hr tablet  Take 1 tablet (5 mg) by mouth daily       isosorbide mononitrate (IMDUR) 60 MG 24 hr tablet  Take 1 tablet (60 mg) by mouth daily       metoprolol (TOPROL-XL) 100 MG 24 hr tablet  Take 1 tablet (100 mg) by mouth daily .  Take with the 25 mg tablet.  Total = 125 mg daily       metoprolol (TOPROL-XL) 25 MG 24 hr tablet  Take 1 tablet (25 mg) by mouth daily .  Take with the 100 mg tablet.  Total = 125 mg daily       atorvastatin (LIPITOR) 80 MG tablet  Take 1 tablet (80 mg) by mouth daily       gabapentin (NEURONTIN) 300 MG capsule  Take 2 capsules (600 mg) by mouth 3 times daily (Patient taking differently: Take 600 mg by mouth 2 times daily )       haloperidol (HALDOL) 5 MG tablet  Take 1 tablet (5 mg) by mouth At Bedtime       omeprazole (PRILOSEC) 40 MG capsule  Take 1 capsule (40 mg) by mouth daily       benztropine (COGENTIN) 1 MG tablet  Take 1 tablet (1 mg) by mouth 2 times daily       order for DME   Equipment being ordered: Rolling walker       losartan (COZAAR) 100 MG tablet  Take 1 tablet (100 mg) by mouth daily       senna-docusate (SENOKOT-S;PERICOLACE) 8.6-50 MG per tablet  Take 1 tablet by mouth 2 times daily as needed for constipation       aspirin (ASPIRIN LOW DOSE) 81 MG EC tablet  Take 1 tablet (81 mg) by mouth daily       No current facility-administered medications for this visit.                    Allergies    Allergen  Reactions       Imidazole Antifungals  Hives        Tolerates diflucan       Ketoprofen  Itching        Pruritis to topical       Lisinopril  Hives       Metronidazole  Hives               Past Medical History:       Denies needing any 02 supplement at night.               Past Medical History                 Past Medical History    Diagnosis  Date       Irritable bowel syndrome         Obesity         Uterine cancer (H)  1983       Type 2 diabetes mellitus (H)  8/30/2011       Illiterate  8/30/2011       Hyperlipidemia LDL goal <100  10/31/2010       Moderate major depression (H)  6/8/2011       Noncompliance with medication regimen  6/8/2011       Osteopenia  10/7/2009       Vitamin B12 deficiency (non anaemic)  11/23/2009       Vitamin D deficiency  1/29/2009       Takotsubo cardiomyopathy         Schizoaffective disorder (H)  9/13/2011       Fecal urgency  3/8/2012       Migraine  4/19/2012       Verbal auditory hallucination  10/4/2012       CINDY (obstructive sleep apnea)  3/8/2012        uses CPAP       CAD (coronary artery disease)          5/2014 cath, nonbostructive stenosis to LAD, RCA.       Chronic low back pain  1/22/2013       Schizoaffective disorder, depressive type (H)  2/25/2013       Migraine headache  4/22/2013       Hypertension  7/29/2013       Suicidal intent  10/2/2013       Cocaine abuse, in remission         History of heroin abuse                  Past Surgical History:     Denies previous upper airway surgery.                Past Surgical History                    Past Surgical History    Procedure  Laterality  Date       Laparoscopic cholecystectomy    2008       Hysterectomy           C oophorectormy for sho, w/bx            UTERINE       Release trigger finger    10/11/2012        Left thumb. Procedure: RELEASE TRIGGER FINGER;  LEFT THUMB TRIGGER RELEASE;  Surgeon: Tay Langley MD;  Location: [facility]       Coronary cta    2014                Social History:                   Social History    Substance Use Topics       Smoking status:  Never Smoker        Smokeless tobacco:  Never Used       Alcohol Use:  Yes          Comment: 2-3 times a week       Chemical History:              Tobacco: Never smoked              Uses 1 cups/day of coffee. Last caffeine intake is usually before only in the morning.              Supplements for wakefulness: Patient does not use any supplements to stay awake              EtOH: None Reported  Recreational Drugs: Patient denies using any recreational drugs       Psych Hx:    PHQ2: Positive  Current dangers to self or others: Denies any SI / HI, hallucinations, or delusions.          Family History:                 Family History                   Family History    Problem  Relation  Age of Onset       CANCER  Mother          BLADDER       Respiratory  Mother          COPD       GASTROINTESTINAL DISEASE  Mother          CIRRHOSIS OF LI BOLIVAR       Alcohol/Drug  Mother         DIABETES  Mother         Hypertension  Mother         Lipids  Mother         C.A.D.  Mother         Glaucoma  Mother         Alcohol/Drug  Sister         Schizophrenia  Sister         Alcohol/Drug  Sister         Psychotic Disorder  Sister         CANCER  Maternal Grandmother          UNKNOWN TYPE       CANCER  Brother          COLON       Cancer - colorectal  Brother          IN HIS LATE 30S       Alcohol/Drug  Brother           OF HEROIN OVERDOSE AT AGE 22 YRS       Macular Degeneration  No family hx of             Sleep Family Hx:       RLS-  None reported         CINDY - Son  Insomnia - None reported  Parasomnia - None reported          Review of Systems:       A complete 10 point review of systems was negative other than HPI or as commented below:      CONSTITUTIONAL: NEGATIVE for fever, sweats or night sweats, drug allergies. Weight gained and chills reported.  EYES: Diplopia and changes in vision reported.  ENT: NEGATIVE for ear pain, sore throat, sinus pain, post-nasal drip, runny nose, bloody nose  CARDIAC: NEGATIVE for chest pain or pressure. Pt reports rapid / irregular heart beats, breathlessness when lying flat, and swollen legs or swollen feet. Pt also reports elevated BP.  NEUROLOGIC: NEGATIVE weakness or numbness in the arms or legs. Occasional headaches during the day.  DERMATOLOGIC: NEGATIVE for rashes, new moles or change in mole(s)  PULMONARY: Pt endorses SOB at rest, SOB with activity, dry cough, productive cough, coughing up blood, wheezing or whistling when breathing.     GASTROINTESTINAL: NEGATIVE for loose or watery stools, fat or grease in stools, constipation, abdominal pain, bowel movements black in color or blood noted. Pt endorses some nausea and vomiting.  GENITOURINARY: NEGATIVE for pain during urination, blood in urine, urinating more frequently than usual, irregular menstrual periods.  MUSCULOSKELETAL: NEGATIVE for muscle pain. She admits having bone or joint pain as well as swollen joints.  ENDOCRINE: NEGATIVE for increased thirst or urination. Pt has DM and she is on insulin.  LYMPHATIC: NEGATIVE for swollen lymph nodes, lumps or bumps in the breasts or nipple discharge.          Physical Examination:    /79  Pulse 85  Resp 16  Ht 1.524 m (5')  Wt 100.6 kg (221 lb 12.5 oz)  LMP 01/06/2015  SpO2 95%  BMI 43.31 kg/m2    Vitals: Reviewed and normal.  General: Alert, oriented, not in distress. Dressed casually; Good eye contact; Comfortably sitting in a chair; in no apparent distress  HEENT: Normocephalic and  atraumatic; NL TM x 2; pupils are isocoric and equally responsive to the light. PERRLA. EOMI. Normal fundoscopic examination; Nasal turbinates are normal with a normal septal alignment;  Mallampati score: Grade IV; Tonsillar hypertrophy: 2  visible at pillars; Pharynx with no erythema or exudates. Small and crowded oropharynx with a low-lying soft palate and macroglossia noted.  NECK: neck supple; symmetrical; no lymphadenopathy; no thyromegaly, bruit, JVD noted. Neck circumference of 18 inches (46 cm).  Lungs: both hemithoraces are symmetrical, normal to palpation, no dullness to percussion, auscultation of lungs revealed normal breath sounds with no expirium prolongation, wheezing, rhonci and crackles.  CVS: Normal S1 and S2 heart sounds with no extra heart sounds. No murmur, rubs, or clicks. Normal peripheral pulses throughout with no obvious peripheral edema.  Extremities/musculoskeletal: no peripheral edema, deformity, cyanosis, clubbing  Neurology: awake, alert, and oriented x 3. No obvious gross motor / sensorial deficits with normal strength in all extremities at 5/5 and normal sensation throughout. Cranial nerves are grossly intact with normal II to XII CN functions.  Psychiatry: Mood and affect are appropriate. Euthymic with affect congruent with full range and intensity. No SI/HI with adequate insight and judgement.          Data: All pertinent previous laboratory data reviewed       No results found for: PH, PHARTERIAL, PO2, [id number], SAT, PCO2, HCO3, BASEEXCESS, GILBERTO, BEB            Lab Results    Component  Value  Date      TSH  0.93  10/25/2016      TSH  1.11  02/15/2016                 Lab Results    Component  Value  Date      GLC  356*  11/11/2016      GLC  179*  10/25/2016                 Lab Results    Component  Value  Date      HGB  10.2*  11/11/2016      HGB  11.4*  10/25/2016                 Lab Results    Component  Value  Date      BUN  21  11/11/2016      BUN  12  10/25/2016      CR   0.96  11/11/2016      CR  0.62  10/25/2016       Echocardiology:              -Recent lexica cardiac test showed a left ventricular EF calculated at 56%. Normal myocardial perfusion was noted. Also, normal wall motion was observed.     Chest x-ray:              -Most recent chest x-ray films on 02/14/2016 demonstrated mildly enlarged heart with possible increased pulmonary vasculature. Otherwise, no other abnormalities noted.     PFT: None Recent Available     Laboratory Studies:     Component  Value  Ref Range & Units  Status      WBC  6.3  4.0 - 11.0 10e9/L  Final      RBC Count  3.30 (L)  3.8 - 5.2 10e12/L  Final      Hemoglobin  10.2 (L)  11.7 - 15.7 g/dL  Final      Hematocrit  31.5 (L)  35.0 - 47.0 %  Final      MCV  96  78 - 100 fl  Final      MCH  30.9  26.5 - 33.0 pg  Final      MCHC  32.4  31.5 - 36.5 g/dL  Final      RDW  12.5  10.0 - 15.0 %  Final      Platelet Count  199  150 - 450 10e9/L  Final      Diff Method  Automated Method    Final      % Neutrophils  51.6  %  Final      % Lymphocytes  36.6  %  Final      % Monocytes  9.8  %  Final      % Eosinophils  1.1  %  Final      % Basophils  0.3  %  Final      % Immature Granulocytes  0.6  %  Final      Nucleated RBCs  0  0 /100  Final      Absolute Neutrophil  3.3  1.6 - 8.3 10e9/L  Final      Absolute Lymphocytes  2.3  0.8 - 5.3 10e9/L  Final      Absolute Monocytes  0.6  0.0 - 1.3 10e9/L  Final      Absolute Eosinophils  0.1  0.0 - 0.7 10e9/L  Final      Absolute Basophils  0.0  0.0 - 0.2 10e9/L  Final      Abs Immature Granulocytes  0.0  0 - 0.4 10e9/L  Final      Absolute Nucleated RBC  0.0    Final         Sodium  139  133 - 144 mmol/L  Final      Potassium  4.5  3.4 - 5.3 mmol/L  Final      Comment:       Specimen slightly hemolyzed, potassium may be falsely elevated      Chloride  106  94 - 109 mmol/L  Final      Carbon Dioxide  24  20 - 32 mmol/L  Final      Anion Gap  9  3 - 14 mmol/L  Final      Glucose  356 (H)  70 - 99 mg/dL  Final      Urea  Nitrogen  21  7 - 30 mg/dL  Final      Creatinine  0.96  0.52 - 1.04 mg/dL  Final      GFR Estimate  60 (L)  >60 mL/min/1.7m2  Final      Comment:       Non  GFR Calc      GFR Estimate If Black  73  >60 mL/min/1.7m2  Final      Comment:        GFR Calc      Calcium  8.1 (L)  8.5 - 10.1 mg/dL  Final      Bilirubin Total  0.2  0.2 - 1.3 mg/dL  Final      Albumin  3.0 (L)  3.4 - 5.0 g/dL  Final      Protein Total  6.9  6.8 - 8.8 g/dL  Final      Alkaline Phosphatase  105  40 - 150 U/L  Final      ALT  18  0 - 50 U/L  Final      AST  19  0 - 45 U/L  Final         Component  Value  Ref Range & Units  Status      Hemoglobin A1C  9.7 (H)  4.3 - 6.0 %  Final         Component  Value  Ref Range & Units  Status      INR    0.86 - 1.14  Final       William Eng MD, MPH  Clinical Sleep Medicine     [facility] [address], [place] [address] [phone] Clinic     Total time spent with patient: 45 min. Over >50% of the time was spent for face to face counseling, education, and evaluation.

## 2017-05-18 NOTE — NURSING NOTE
Chief Complaint   Patient presents with     RECHECK     recheck pap use       Initial /79  Pulse 85  Resp 16  Ht 1.524 m (5')  Wt 100.6 kg (221 lb 12.5 oz)  LMP 01/06/2015  SpO2 95%  BMI 43.31 kg/m2 Estimated body mass index is 43.31 kg/(m^2) as calculated from the following:    Height as of this encounter: 1.524 m (5').    Weight as of this encounter: 100.6 kg (221 lb 12.5 oz).  Medication Reconciliation: genoveva Aguilar CNA

## 2017-05-22 DIAGNOSIS — K59.00 CONSTIPATION, UNSPECIFIED CONSTIPATION TYPE: ICD-10-CM

## 2017-05-25 ENCOUNTER — OFFICE VISIT (OUTPATIENT)
Dept: OPHTHALMOLOGY | Facility: CLINIC | Age: 57
End: 2017-05-25
Attending: OPHTHALMOLOGY
Payer: MEDICARE

## 2017-05-25 DIAGNOSIS — H25.11 SENILE NUCLEAR SCLEROSIS, RIGHT: ICD-10-CM

## 2017-05-25 DIAGNOSIS — E11.3293 TYPE 2 DIABETES MELLITUS WITH BOTH EYES AFFECTED BY MILD NONPROLIFERATIVE RETINOPATHY WITHOUT MACULAR EDEMA, WITH LONG-TERM CURRENT USE OF INSULIN (H): Primary | ICD-10-CM

## 2017-05-25 DIAGNOSIS — Z79.4 TYPE 2 DIABETES MELLITUS WITH BOTH EYES AFFECTED BY MILD NONPROLIFERATIVE RETINOPATHY WITHOUT MACULAR EDEMA, WITH LONG-TERM CURRENT USE OF INSULIN (H): Primary | ICD-10-CM

## 2017-05-25 PROCEDURE — 92134 CPTRZ OPH DX IMG PST SGM RTA: CPT | Mod: ZF | Performed by: OPHTHALMOLOGY

## 2017-05-25 PROCEDURE — 99213 OFFICE O/P EST LOW 20 MIN: CPT | Mod: ZF

## 2017-05-25 ASSESSMENT — CONF VISUAL FIELD
OS_NORMAL: 1
OD_NORMAL: 1

## 2017-05-25 ASSESSMENT — VISUAL ACUITY
OD_CC: J7
OD_SC: 20/40
METHOD: SNELLEN - LINEAR
OS_SC: 20/50
OS_CC: J7

## 2017-05-25 ASSESSMENT — SLIT LAMP EXAM - LIDS
COMMENTS: NORMAL
COMMENTS: NORMAL

## 2017-05-25 ASSESSMENT — EXTERNAL EXAM - RIGHT EYE: OD_EXAM: NORMAL

## 2017-05-25 ASSESSMENT — CUP TO DISC RATIO
OD_RATIO: 0.3
OS_RATIO: 0.3

## 2017-05-25 ASSESSMENT — TONOMETRY
OD_IOP_MMHG: 27
IOP_METHOD: TONOPEN
OS_IOP_MMHG: 23

## 2017-05-25 ASSESSMENT — EXTERNAL EXAM - LEFT EYE: OS_EXAM: NORMAL

## 2017-05-25 NOTE — MR AVS SNAPSHOT
After Visit Summary   5/25/2017    Nena Tang    MRN: [id number]           Patient Information     Date Of Birth          1960        Visit Information        Provider Department      5/25/2017 10:15 AM Tiburcio Chacon MD [facility]        Today's Diagnoses     Type 2 diabetes mellitus with both eyes affected by mild nonproliferative retinopathy without macular edema, with long-term current use of insulin (H)    -  1    Senile nuclear sclerosis, right           Follow-ups after your visit        Follow-up notes from your care team     Return in about 6 months (around 11/25/2017) for Diabetic exam, Exam & OCT OU.      Your next 10 appointments already scheduled     Jun 02, 2017  9:30 AM CDT   Return Visit with Usman Hodgson PA-C   [facility] ([facility])    [address] [place] [address]  [address]  [facility] [address] [phone]            Jun 05, 2017  2:00 PM CDT   Post-Op with Tyra Diaz MD   Eye Clinic ([facility])    [address]  [address]  [address] [address]  [facility] [address]   [phone]            Jun 30, 2017 10:00 AM CDT   Return Sleep Patient with William Eng MD   [facility] ([facility])    [address]  [facility] [id number] [phone]            Nov 15, 2017 10:15 AM CST   RETURN RETINA with Tiburcio Chacon MD   Eye Clinic ([facility])    [address]  [address]  [address] [address]  [facility] [address]   [phone]              Future tests that were ordered for you today     Open Future Orders        Priority Expected Expires Ordered    OCT Retina Spectralis OU (both eyes) Routine  11/26/2018 5/25/2017            Who to contact     Please call your clinic at [phone] to:    Ask questions about your health    Make or cancel  appointments    Discuss your medicines    Learn about your test results    Speak to your doctor   If you have compliments or concerns about an experience at your clinic, or if you wish to file a complaint, please contact [facility] Patient Relations at [phone] or email us at [email or web address]         Additional Information About Your Visit        ArcS[employer] Information     [employer] gives you secure access to your electronic health record. If you see a primary care provider, you can also send messages to your care team and make appointments. If you have questions, please call your primary care clinic.  If you do not have a primary care provider, please call [phone] and they will assist you.      [employer] is an electronic gateway that provides easy, online access to your medical records. With [employer], you can request a clinic appointment, read your test results, renew a prescription or communicate with your care team.     To access your existing account, please contact your [facility] or call [phone] for assistance.        Care EveryWhere ID     This is your Care EveryWhere ID. This could be used by other organizations to access your [place] medical records  [id number]        Your Vitals Were     Last Period                   01/06/2015            Blood Pressure from Last 3 Encounters:   05/18/17 118/79   05/05/17 108/72   04/21/17 102/58    Weight from Last 3 Encounters:   05/18/17 100.6 kg (221 lb 12.5 oz)   05/05/17 100.7 kg (222 lb)   04/21/17 100.7 kg (222 lb)              We Performed the Following     OCT Retina Spectralis OU (both eyes)          Today's Medication Changes          These changes are accurate as of: 5/25/17 12:10 PM.  If you have any questions, ask your nurse or doctor.               These medicines have changed or have updated prescriptions.        Dose/Directions    omeprazole 40 MG capsule   Commonly known  as:  priLOSEC   This may have changed:  when to take this   Used for:  Gastroesophageal reflux disease without esophagitis        Dose:  40 mg   Take 1 capsule (40 mg) by mouth daily   Quantity:  30 capsule   Refills:  5                Primary Care Provider    None Specified       No primary provider on file.        Goals        General    Medical (pt-stated)     Notes - Note created  7/7/2016  9:15 AM by Chelsea Pulido RN    Get blood sugars under control    Improve medication compliance    As of today's date 7/7/2016 goal is met at 0 - 25%.   Goal Status:  Active          Thank you!     Thank you for choosing EYE CLINIC  for your care. Our goal is always to provide you with excellent care. Hearing back from our patients is one way we can continue to improve our services. Please take a few minutes to complete the written survey that you may receive in the mail after your visit with us. Thank you!             Your Updated Medication List - Protect others around you: Learn how to safely use, store and throw away your medicines at www.disposemymeds.org.          This list is accurate as of: 5/25/17 12:10 PM.  Always use your most recent med list.                   Brand Name Dispense Instructions for use    acetaminophen 500 MG tablet    TYLENOL    100 tablet    Take 2 tablets (1,000 mg) by mouth every 6 hours as needed for pain       aspirin 81 MG EC tablet    ASPIRIN LOW DOSE    100 tablet    Take 1 tablet (81 mg) by mouth daily       atorvastatin 80 MG tablet    LIPITOR    60 tablet    TAKE 1 TABLET (80MG) BY MOUTH DAILY       benztropine 1 MG tablet    COGENTIN    60 tablet    Take 1 tablet (1 mg) by mouth 2 times daily       blood glucose monitoring lancets     1 Box    Use to test blood sugar 2 times daily or as directed.  Ok to substitute alternative if insurance prefers.       citalopram 20 MG tablet    celeXA    30 tablet    Take 1 tablet (20 mg) by mouth daily       DOK PLUS 8.6-50 MG per tablet    Generic drug:  senna-docusate     100 tablet    TAKE 1 TABLET BY MOUTH TWICE A DAY AS NEEDED FOR CONSTIPATION       gabapentin 300 MG capsule    NEURONTIN    168 capsule    TAKE 2 CAPSULES (600MG) BY MOUTH THREE TIMES A DAY       glucose 4 G Chew chewable tablet     20 tablet    Take 2 every 15 minutes for blood sugar <70mg/dL. Recheck blood sugar every 15 minutes until above 70mg/dL, then eat a substantial meal.       haloperidol 5 MG tablet    HALDOL    30 tablet    Take 1 tablet (5 mg) by mouth At Bedtime       ibuprofen 600 MG tablet    ADVIL/MOTRIN    60 tablet    Take 1 tablet (600 mg) by mouth every 4 hours as needed for moderate pain       LANTUS SOLOSTAR 100 UNIT/ML injection   Generic drug:  insulin glargine     15 mL    INJECT 45 UNITS SUBCUTANEOUSLY EVERY NIGHT AT BEDTIME       levofloxacin 0.5 % ophthalmic solution    QUIXIN    2 Bottle    1 drop in surgical eye as directed - 4x daily for 1 week, then stop       losartan 100 MG tablet    COZAAR    90 tablet    TAKE 1 TABLET (100MG) BY MOUTH DAILY       melatonin 5 MG Caps     90 capsule    Take 1 capsule by mouth daily At dinnertime       metoprolol 100 MG 24 hr tablet    TOPROL-XL    60 tablet    TAKE 1 TABLET (100MG) BY MOUTH DAILY       NovoLOG FLEXPEN 100 UNIT/ML injection   Generic drug:  insulin aspart     15 mL    INJECT 20 UNITS SUBCUTANEOUSLY THREE TIMES A DAY (WITH MEALS), INJECT AN EXTRA 7 UNITS ONCE DAILY FOR BLOOD SUGAR OVER 500       omeprazole 40 MG capsule    priLOSEC    30 capsule    Take 1 capsule (40 mg) by mouth daily       ONE TOUCH ULTRA test strip   Generic drug:  blood glucose monitoring     150 strip    TEST TWICE DAILY AS DIRECTED.       * order for DME     1 Device    Equipment being ordered: Rolling walker       * order for DME     1 each    Hold Novolog if meals are refused.       polyethylene glycol powder    MIRALAX/GLYCOLAX    527 g    TAKE 17 GRAMS (1 CAPFUL) BY MOUTH DAILY       prednisoLONE acetate 1 % ophthalmic  susp    PRED FORTE    2 Bottle    1 drop in surgical eye as directed, 4x daily after surgery for 1 week, 3x daily for 1 week, 2x daily for 1 week, daily for 1 week, then stop       TRULICITY 1.5 MG/0.5ML pen   Generic drug:  dulaglutide     2 mL    INJECT 1.5MG SUBCUTANEOUSLY EVERY SEVEN DAYS       ULTICARE MINI 31G X 6 MM   Generic drug:  insulin pen needle     100 each    USE 4 DAILY OR AS DIRECTED       vitamin D3 67937 UNITS capsule    CHOLECALCIFEROL    4 capsule    TAKE 1 CAPSULE (50,000 UNITS) BY MOUTH ONCE A WEEK       * Notice:  This list has 2 medication(s) that are the same as other medications prescribed for you. Read the directions carefully, and ask your doctor or other care provider to review them with you.

## 2017-05-25 NOTE — NURSING NOTE
Chief Complaints and History of Present Illnesses   Patient presents with     Follow Up For     s/p NPDR (nonproliferative diabetic retinopathy) (H) (Primary Dx...     HPI    Affected eye(s):  Both   Symptoms:     No blurred vision   No decreased vision   Floaters   No flashes   No Dryness   No itching      Duration:  2 months   Frequency:  Constant       Do you have eye pain now?:  No      Comments:  Pt stated  LE vision has improved since last visit, RE stable.  Lab Results       Component                Value               Date                       A1C                      7.2                 03/28/2017                 A1C                      7.1                 01/27/2017                 A1C                      9.7                 11/11/2016                 A1C                      9.3                 08/11/2016                 A1C                      9.8                 05/23/2016            BS: 145 this morning  Mg Clifton  10:38 AM May 25, 2017

## 2017-05-25 NOTE — PROGRESS NOTES
I have confirmed the patient's CC, HPI and reviewed Past Medical History, Past Surgical History, Social History, Family History, Problem List, Medication List and agree with Tech note.    CC: blurry vision     HPI: 56YO F Hx of DMII here for diabetic exam. DMII x 13 years. On oral and insulin. Last hgA1c 7.2% in 3/28/2017. Glucose under more control. Hoping to get cataract surgery with Dr. Diaz now.    OCT Macula 3/30/2017  OD: mild intra-retinal fluid temporal macula (stable), no subretinal fluid, exudates  OS: exudates, no intra-retinal fluid    Assessment/plan   1.  DMII: severe NPDR   - last HgA1c 7.2 3/2017 significantly improved (from 9 and 12 prior)!   - emphasized importance of good blood glucose/blood pressure control     2. Myopia OS with secondary amblyopia              Does not wear glasses normally     3.  Cataracts, right eye    -Was previously scheduled for cataract surgery OS with Dr Diaz   -A1C now improved    -OK to proceed with cataract surgery by Tyra Diaz MD    Return 6 months for OCT OU with retina and one month with Tyra Diaz MD for Cataract extraction right eye       Tiburcio Martin MD PhD.  Professor & Chair.

## 2017-05-30 DIAGNOSIS — E55.9 VITAMIN D DEFICIENCY: ICD-10-CM

## 2017-05-30 RX ORDER — CHOLECALCIFEROL (VITAMIN D3) 1250 MCG
CAPSULE ORAL
Qty: 4 CAPSULE | Refills: 3 | Status: SHIPPED | OUTPATIENT
Start: 2017-05-30 | End: 2017-09-18

## 2017-06-02 ENCOUNTER — OFFICE VISIT (OUTPATIENT)
Dept: FAMILY MEDICINE | Facility: CLINIC | Age: 57
End: 2017-06-02
Payer: MEDICARE

## 2017-06-02 VITALS
TEMPERATURE: 98.5 F | OXYGEN SATURATION: 99 % | HEART RATE: 87 BPM | SYSTOLIC BLOOD PRESSURE: 138 MMHG | WEIGHT: 226 LBS | DIASTOLIC BLOOD PRESSURE: 82 MMHG | RESPIRATION RATE: 16 BRPM | BODY MASS INDEX: 44.14 KG/M2

## 2017-06-02 DIAGNOSIS — G24.01 TARDIVE DYSKINESIA: ICD-10-CM

## 2017-06-02 DIAGNOSIS — G47.33 OSA (OBSTRUCTIVE SLEEP APNEA): ICD-10-CM

## 2017-06-02 DIAGNOSIS — Z78.0 ASYMPTOMATIC POSTMENOPAUSAL STATUS: ICD-10-CM
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
## 2017-06-02 DIAGNOSIS — F33.1 MAJOR DEPRESSIVE DISORDER, RECURRENT EPISODE, MODERATE (H): ICD-10-CM

## 2017-06-02 PROCEDURE — 99214 OFFICE O/P EST MOD 30 MIN: CPT | Performed by: PHYSICIAN ASSISTANT

## 2017-06-02 RX ORDER — DIAPER,BRIEF,INFANT-TODD,DISP
EACH MISCELLANEOUS
Qty: 30 G | Refills: 0 | Status: ON HOLD | OUTPATIENT
Start: 2017-06-02 | End: 2017-08-29

## 2017-06-02 NOTE — NURSING NOTE
Chief Complaint   Patient presents with     Derm Problem       Initial /82  Pulse 87  Temp 98.5  F (36.9  C) (Oral)  Resp 16  Wt 226 lb (102.5 kg)  LMP 01/06/2015  SpO2 99%  BMI 44.14 kg/m2 Estimated body mass index is 44.14 kg/(m^2) as calculated from the following:    Height as of 5/18/17: 5' (1.524 m).    Weight as of this encounter: 226 lb (102.5 kg).  Medication Reconciliation: complete   Lio Wilder MA

## 2017-06-02 NOTE — MR AVS SNAPSHOT
After Visit Summary   6/2/2017    Nena Tang    MRN: [id number]           Patient Information     Date Of Birth          1960        Visit Information        Provider Department      6/2/2017 9:30 AM Usman Hodgson PA-C [facility]        Today's Diagnoses     Bug bites, initial encounter    -  1    Asymptomatic postmenopausal status        Major depressive disorder, recurrent episode, moderate (H)           Follow-ups after your visit        Your next 10 appointments already scheduled     Jun 05, 2017  2:00 PM CDT   Post-Op with Tyra Diaz MD   Eye Clinic ([facility])    [address]  [address] [place] [address]   [phone]            Jun 30, 2017 10:00 AM CDT   Return Sleep Patient with William Eng MD   [facility] ([facility])    [address]  [facility] [address]   [phone]            Nov 15, 2017 10:15 AM CST   RETURN RETINA with Tiburcio Chacon MD   Eye Clinic ([facility])    [address]  [address] [facility]  [address] [address]   [phone]              Future tests that were ordered for you today     Open Future Orders        Priority Expected Expires Ordered    DEXA HIP/PELVIS/SPINE - Future Routine  6/2/2018 6/2/2017            Who to contact     If you have questions or need follow up information about today's clinic visit or your schedule please contact [facility] directly at [phone].  Normal or non-critical lab and imaging results will be communicated to you by MyChart, letter or phone within 4 business days after the clinic has received the results. If you do not hear from us within 7 days, please contact the clinic through MyChart or phone. If you have a critical or abnormal lab result, we will notify you by phone as  soon as possible.  Submit refill requests through [employer] or call your pharmacy and they will forward the refill request to us. Please allow 3 business days for your refill to be completed.          Additional Information About Your Visit        [employer] Information     [employer] gives you secure access to your electronic health record. If you see a primary care provider, you can also send messages to your care team and make appointments. If you have questions, please call your primary care clinic.  If you do not have a primary care provider, please call [phone] and they will assist you.        Care EveryWhere ID     This is your Care EveryWhere ID. This could be used by other organizations to access your [place] medical records  [id number]        Your Vitals Were     Pulse Temperature Respirations Last Period Pulse Oximetry BMI (Body Mass Index)    87 98.5  F (36.9  C) (Oral) 16 01/06/2015 99% 44.14 kg/m2       Blood Pressure from Last 3 Encounters:   06/02/17 138/82   05/18/17 118/79   05/05/17 108/72    Weight from Last 3 Encounters:   06/02/17 226 lb (102.5 kg)   05/18/17 221 lb 12.5 oz (100.6 kg)   05/05/17 222 lb (100.7 kg)                 Today's Medication Changes          These changes are accurate as of: 6/2/17  9:58 AM.  If you have any questions, ask your nurse or doctor.               Start taking these medicines.        Dose/Directions    hydrocortisone 1 % ointment   Used for:  Bug bites, initial encounter   Started by:  Usman Hodgson PA-C        Apply sparingly to affected area three times daily for 14 days.   Quantity:  30 g   Refills:  0         These medicines have changed or have updated prescriptions.        Dose/Directions    omeprazole 40 MG capsule   Commonly known as:  priLOSEC   This may have changed:  when to take this   Used for:  Gastroesophageal reflux disease without esophagitis        Dose:  40 mg   Take 1 capsule (40 mg) by mouth daily   Quantity:  30 capsule    Refills:  5            Where to get your medicines      These medications were sent to [address]  [address] [facility] [address]     Phone:  [phone]     hydrocortisone 1 % ointment                Primary Care Provider    None Specified       No primary provider on file.        Goals        General    Medical (pt-stated)     Notes - Note created  7/7/2016  9:15 AM by Chelsea Pulido RN    Get blood sugars under control    Improve medication compliance    As of today's date 7/7/2016 goal is met at 0 - 25%.   Goal Status:  Active          Thank you!     Thank you for choosing [facility]  for your care. Our goal is always to provide you with excellent care. Hearing back from our patients is one way we can continue to improve our services. Please take a few minutes to complete the written survey that you may receive in the mail after your visit with us. Thank you!             Your Updated Medication List - Protect others around you: Learn how to safely use, store and throw away your medicines at www.disposemymeds.org.          This list is accurate as of: 6/2/17  9:58 AM.  Always use your most recent med list.                   Brand Name Dispense Instructions for use    acetaminophen 500 MG tablet    TYLENOL    100 tablet    Take 2 tablets (1,000 mg) by mouth every 6 hours as needed for pain       aspirin 81 MG EC tablet    ASPIRIN LOW DOSE    100 tablet    Take 1 tablet (81 mg) by mouth daily       atorvastatin 80 MG tablet    LIPITOR    60 tablet    TAKE 1 TABLET (80MG) BY MOUTH DAILY       benztropine 1 MG tablet    COGENTIN    60 tablet    Take 1 tablet (1 mg) by mouth 2 times daily       blood glucose monitoring lancets     1 Box    Use to test blood sugar 2 times daily or as directed.  Ok to substitute alternative if insurance prefers.       citalopram 20 MG tablet    celeXA    30 tablet    Take 1  tablet (20 mg) by mouth daily       DOK PLUS 8.6-50 MG per tablet   Generic drug:  senna-docusate     100 tablet    TAKE 1 TABLET BY MOUTH TWICE A DAY AS NEEDED FOR CONSTIPATION       gabapentin 300 MG capsule    NEURONTIN    168 capsule    TAKE 2 CAPSULES (600MG) BY MOUTH THREE TIMES A DAY       glucose 4 G Chew chewable tablet     20 tablet    Take 2 every 15 minutes for blood sugar <70mg/dL. Recheck blood sugar every 15 minutes until above 70mg/dL, then eat a substantial meal.       haloperidol 5 MG tablet    HALDOL    30 tablet    Take 1 tablet (5 mg) by mouth At Bedtime       hydrocortisone 1 % ointment     30 g    Apply sparingly to affected area three times daily for 14 days.       ibuprofen 600 MG tablet    ADVIL/MOTRIN    60 tablet    Take 1 tablet (600 mg) by mouth every 4 hours as needed for moderate pain       LANTUS SOLOSTAR 100 UNIT/ML injection   Generic drug:  insulin glargine     15 mL    INJECT 45 UNITS SUBCUTANEOUSLY EVERY NIGHT AT BEDTIME       levofloxacin 0.5 % ophthalmic solution    QUIXIN    2 Bottle    1 drop in surgical eye as directed - 4x daily for 1 week, then stop       losartan 100 MG tablet    COZAAR    90 tablet    TAKE 1 TABLET (100MG) BY MOUTH DAILY       melatonin 5 MG Caps     90 capsule    Take 1 capsule by mouth daily At dinnertime       metoprolol 100 MG 24 hr tablet    TOPROL-XL    60 tablet    TAKE 1 TABLET (100MG) BY MOUTH DAILY       NovoLOG FLEXPEN 100 UNIT/ML injection   Generic drug:  insulin aspart     15 mL    INJECT 20 UNITS SUBCUTANEOUSLY THREE TIMES A DAY (WITH MEALS), INJECT AN EXTRA 7 UNITS ONCE DAILY FOR BLOOD SUGAR OVER 500       omeprazole 40 MG capsule    priLOSEC    30 capsule    Take 1 capsule (40 mg) by mouth daily       ONE TOUCH ULTRA test strip   Generic drug:  blood glucose monitoring     150 strip    TEST TWICE DAILY AS DIRECTED.       * order for DME     1 Device    Equipment being ordered: Rolling walker       * order for DME     1 each    Hold  Novolog if meals are refused.       polyethylene glycol powder    MIRALAX/GLYCOLAX    527 g    TAKE 17 GRAMS (1 CAPFUL) BY MOUTH DAILY       prednisoLONE acetate 1 % ophthalmic susp    PRED FORTE    2 Bottle    1 drop in surgical eye as directed, 4x daily after surgery for 1 week, 3x daily for 1 week, 2x daily for 1 week, daily for 1 week, then stop       TRULICITY 1.5 MG/0.5ML pen   Generic drug:  dulaglutide     2 mL    INJECT 1.5MG SUBCUTANEOUSLY EVERY SEVEN DAYS       ULTICARE MINI 31G X 6 MM   Generic drug:  insulin pen needle     100 each    USE 4 DAILY OR AS DIRECTED       vitamin D3 00488 UNITS capsule    CHOLECALCIFEROL    4 capsule    TAKE 1 CAPSULE (50,000 UNITS) BY MOUTH ONCE A WEEK       * Notice:  This list has 2 medication(s) that are the same as other medications prescribed for you. Read the directions carefully, and ask your doctor or other care provider to review them with you.

## 2017-06-02 NOTE — PROGRESS NOTES
SUBJECTIVE:                                                    Nena Tang is a 57 year old female who presents to clinic today with group home caretaker for the following health issues:      Rash      Duration:  Week     Description  Location:  Right leg, left hand and face   Itching: mild    Intensity:  mild    Accompanying signs and symptoms: None     History (similar episodes/previous evaluation): None    Precipitating or alleviating factors:  New exposures:  Unknown to pt   Recent travel: no      Therapies tried and outcome: none         Social History   Substance Use Topics     Smoking status: Never Smoker     Smokeless tobacco: Never Used     Alcohol use No      Comment: last month        Problem list and histories reviewed & adjusted, as indicated.  Additional history: Encouraged the restart of C-Pap use.  Patient will start daily for 1 month and follow up. Her sister is currently in the hospital and Patient notes she is doing well despite this and is handling stress better than usual.  Seeing ophthalmology soon for follow up. PHQ-9 and TAMIA-7 obtained and reviewed.      Patient Active Problem List   Diagnosis     Osteopenia     Vitamin B12 deficiency without anemia     Hyperlipidemia LDL goal <100     Moderate major depression (H)     Rotator cuff syndrome     Type 2 diabetes mellitus with mild nonproliferative retinopathy (H)     Illiterate     Irritable bowel syndrome     overweight - BMI >35     Takotsubo cardiomyopathy     CAD (coronary artery disease)     Restless legs syndrome (RLS)     CINDY (obstructive sleep apnea)- mild AHI 10.3     Verbal auditory hallucination     Chronic low back pain     Schizoaffective disorder, depressive type (H)     Migraine headache     HTN, goal below 140/90     Health Care Home     Lumbago     Cervicalgia     Cocaine abuse, episodic     Suicidal ideation     Esophageal reflux     Mild nonproliferative diabetic retinopathy (H)     Tardive dyskinesia     Alcohol use      Left cataract     Falls frequently     History of uterine cancer     Schizoaffective disorder (H)     Depression     Psychophysiological insomnia     Past Surgical History:   Procedure Laterality Date     C OOPHORECTORMY FOR RAHEL, W/BX      UTERINE     COLONOSCOPY N/A 3/16/2017    Procedure: COLONOSCOPY;  Surgeon: Tarci Gonzalez MD;  Location:  GI     Coronary CTA  2014     HYSTERECTOMY      uterine cancer yearly pap's per provider.     LAPAROSCOPIC CHOLECYSTECTOMY       PHACOEMULSIFICATION CLEAR CORNEA WITH STANDARD INTRAOCULAR LENS IMPLANT Left 2017    Procedure: PHACOEMULSIFICATION CLEAR CORNEA WITH STANDARD INTRAOCULAR LENS IMPLANT;  LEFT EYE PHACOEMULSIFICATION CLEAR CORNEA WITH STANDARD INTRAOCULAR LENS IMPLANT ;  Surgeon: Tyra Diaz MD;  Location:  [facility]     RELEASE TRIGGER FINGER  10/11/2012    Left thumb. Procedure: RELEASE TRIGGER FINGER;  LEFT THUMB TRIGGER RELEASE;  Surgeon: Tay Langley MD;  Location:  [facility]     RELEASE TRIGGER FINGER Right 2016    Procedure: RELEASE TRIGGER FINGER;  Surgeon: Albino Castañeda MD;  Location: [facility] OR       Social History   Substance Use Topics     Smoking status: Never Smoker     Smokeless tobacco: Never Used     Alcohol use No      Comment: last month     Family History   Problem Relation Age of Onset     CANCER Mother      BLADDER     Respiratory Mother      COPD     GASTROINTESTINAL DISEASE Mother      CIRRHOSIS OF LI BOLIVAR     Alcohol/Drug Mother      DIABETES Mother      Hypertension Mother      Lipids Mother      C.A.D. Mother      Glaucoma Mother      Alcohol/Drug Sister      MENTAL ILLNESS Sister      Alcohol/Drug Sister      Psychotic Disorder Sister      CANCER Maternal Grandmother      UNKNOWN TYPE     CANCER Brother      COLON     Cancer - colorectal Brother      IN HIS LATE 30S     Alcohol/Drug Brother       OF HEROIN OVERDOSE AT AGE 22 YRS     Macular Degeneration No family hx of              Current Outpatient Prescriptions   Medication Sig Dispense Refill     hydrocortisone 1 % ointment Apply sparingly to affected area three times daily for 14 days. 30 g 0     vitamin D3 (CHOLECALCIFEROL) 71738 UNITS capsule TAKE 1 CAPSULE (50,000 UNITS) BY MOUTH ONCE A WEEK 4 capsule 3     DOK PLUS 50-8.6 MG per tablet TAKE 1 TABLET BY MOUTH TWICE A DAY AS NEEDED FOR CONSTIPATION 100 tablet 3     losartan (COZAAR) 100 MG tablet TAKE 1 TABLET (100MG) BY MOUTH DAILY 90 tablet 1     atorvastatin (LIPITOR) 80 MG tablet TAKE 1 TABLET (80MG) BY MOUTH DAILY 60 tablet 1     metoprolol (TOPROL-XL) 100 MG 24 hr tablet TAKE 1 TABLET (100MG) BY MOUTH DAILY 60 tablet 1     gabapentin (NEURONTIN) 300 MG capsule TAKE 2 CAPSULES (600MG) BY MOUTH THREE TIMES A  capsule 3     TRULICITY 1.5 MG/0.5ML pen INJECT 1.5MG SUBCUTANEOUSLY EVERY SEVEN DAYS 2 mL 11     levofloxacin (QUIXIN) 0.5 % ophthalmic solution 1 drop in surgical eye as directed - 4x daily for 1 week, then stop 2 Bottle 1     prednisoLONE acetate (PRED FORTE) 1 % ophthalmic susp 1 drop in surgical eye as directed, 4x daily after surgery for 1 week, 3x daily for 1 week, 2x daily for 1 week, daily for 1 week, then stop 2 Bottle 1     ONE TOUCH ULTRA test strip TEST TWICE DAILY AS DIRECTED. 150 strip 11     polyethylene glycol (MIRALAX/GLYCOLAX) powder TAKE 17 GRAMS (1 CAPFUL) BY MOUTH DAILY 527 g 3     NOVOLOG FLEXPEN 100 UNIT/ML soln INJECT 20 UNITS SUBCUTANEOUSLY THREE TIMES A DAY (WITH MEALS), INJECT AN EXTRA 7 UNITS ONCE DAILY FOR BLOOD SUGAR OVER 500 15 mL 3     ULTICARE MINI 31G X 6 MM insulin pen needle USE 4 DAILY OR AS DIRECTED 100 each 11     LANTUS SOLOSTAR 100 UNIT/ML soln INJECT 45 UNITS SUBCUTANEOUSLY EVERY NIGHT AT BEDTIME 15 mL 3     aspirin (ASPIRIN LOW DOSE) 81 MG EC tablet Take 1 tablet (81 mg) by mouth daily 100 tablet 4     omeprazole (PRILOSEC) 40 MG capsule Take 1 capsule (40 mg) by mouth daily (Patient taking differently: Take 40 mg by mouth  every morning ) 30 capsule 5     acetaminophen (TYLENOL) 500 MG tablet Take 2 tablets (1,000 mg) by mouth every 6 hours as needed for pain 100 tablet 0     ibuprofen (ADVIL,MOTRIN) 600 MG tablet Take 1 tablet (600 mg) by mouth every 4 hours as needed for moderate pain 60 tablet 0     melatonin 5 MG CAPS Take 1 capsule by mouth daily At dinnertime 90 capsule 1     glucose 4 G CHEW Take 2 every 15 minutes for blood sugar <70mg/dL. Recheck blood sugar every 15 minutes until above 70mg/dL, then eat a substantial meal. 20 tablet 1     order for DME Hold Novolog if meals are refused. 1 each 0     blood glucose monitoring (ONE TOUCH DELICA) lancets Use to test blood sugar 2 times daily or as directed.  Ok to substitute alternative if insurance prefers. 1 Box prn     citalopram (CELEXA) 20 MG tablet Take 1 tablet (20 mg) by mouth daily 30 tablet 2     haloperidol (HALDOL) 5 MG tablet Take 1 tablet (5 mg) by mouth At Bedtime 30 tablet 2     benztropine (COGENTIN) 1 MG tablet Take 1 tablet (1 mg) by mouth 2 times daily 60 tablet 3     order for DME Equipment being ordered: Rolling walker 1 Device 0     Allergies   Allergen Reactions     Imidazole Antifungals Hives     Tolerates diflucan     Ketoprofen Itching     Pruritis to topical     Lisinopril Hives     Metformin Other (See Comments)     Patient hospitalized for lactic acidosis - admitting provider suspectd caused by metformin     Metronidazole Hives     Posaconazole Hives     Tolerates diflucan     Recent Labs   Lab Test  03/28/17   1114  01/27/17   1211  01/24/17   1957  01/22/17   1936  12/29/16   0909   11/11/16   2234  10/25/16   0815   07/13/16   1350   07/14/15   1215   06/02/14   1132   09/03/13   1156   01/22/13   1134   A1C  7.2*  7.1*   --    --    --    --   9.7*   --    < >   --    < >  9.1*   < >  8.3*   < >  10.8*   < >  12.9*   LDL   --    --    --    --    --    --    --    --    --   137*   --   78   --   71   --   90   --   92   HDL   --    --    --     --    --    --    --    --    --    --    --   39*   --    --    --   37*   --   45*   TRIG   --    --    --    --    --    --    --    --    --    --    --   151*   --    --    --   171*   --   220*   ALT   --    --   24  25  26   --   18  15   < >   --    < >   --    < >   --    < >  38   < >  32   CR   --    --   0.94  0.68  0.72   < >  0.96  0.62   < >   --    < >  0.67   < >   --    < >  0.54   < >  0.52   GFRESTIMATED   --    --   61  90  83   < >  60*  >90  Non  GFR Calc     < >   --    < >  >90  Non  GFR Calc     < >   --    < >  >90   < >  >90   GFRESTBLACK   --    --   74  >90   GFR Calc    >90   GFR Calc     < >  73  >90   GFR Calc     < >   --    < >  >90   GFR Calc     < >   --    < >  >90   < >  >90   POTASSIUM   --    --   4.6  4.1  4.2   < >  4.5  3.8   < >   --    < >  4.3   < >   --    < >  4.4   < >  4.0   TSH   --    --    --    --   2.24   --    --   0.93   --    --    < >   --    < >   --    < >  1.42   --    --     < > = values in this interval not displayed.      BP Readings from Last 3 Encounters:   06/02/17 138/82   05/18/17 118/79   05/05/17 108/72    Wt Readings from Last 3 Encounters:   06/02/17 226 lb (102.5 kg)   05/18/17 221 lb 12.5 oz (100.6 kg)   05/05/17 222 lb (100.7 kg)        Labs reviewed in EPIC  Problem list, Medication list, Allergies, and Medical/Social/Surgical histories reviewed in [facility] and updated as appropriate.     ROS: 10 point ROS neg other than the symptoms noted above in the HPI.     OBJECTIVE:                                                    /82  Pulse 87  Temp 98.5  F (36.9  C) (Oral)  Resp 16  Wt 226 lb (102.5 kg)  LMP 01/06/2015  SpO2 99%  BMI 44.14 kg/m2   EXAM:  Constitutional: healthy, alert and no distress   Psychiatric: Psych: Alert and oriented times 3; coherent speech, normal   rate and volume, able to articulate logical thoughts, able   to  abstract reason, no tangential thoughts, no hallucinations   or delusions  Her affect is appropriate.  NEURO: Evident tongue lolling secondary to TD.  SKIN: 0.2-3 mm erythematous papules with excoriation on forearms and LE c/w bug bite likely mosquito.  Cannot r/o other insect  JOINT/EXTREMITIES: extremities normal- no gross deformities noted, gait normal and normal muscle tone       ASSESSMENT/PLAN:                                                    1. Bug bites, initial encounter  - hydrocortisone 1 % ointment; Apply sparingly to affected area three times daily for 14 days.  Dispense: 30 g; Refill: 0  -Preventative measures reviewed.    2. Asymptomatic postmenopausal status  - DEXA HIP/PELVIS/SPINE - Future; Future    3. Major depressive disorder, recurrent episode, moderate (H)    4. CINDY (obstructive sleep apnea)- mild AHI 10.3    5. Tardive dyskinesia    Use medication as directed.  Follow up if symptoms should persist, change or worsen.  Follow up in 1 month, sooner if needed.  Patient and care-taker amenable to this follow up plan.     Usman Hodgson PA-C  [facility]

## 2017-06-05 ENCOUNTER — OFFICE VISIT (OUTPATIENT)
Dept: OPHTHALMOLOGY | Facility: CLINIC | Age: 57
End: 2017-06-05
Attending: OPHTHALMOLOGY
Payer: MEDICARE

## 2017-06-05 ENCOUNTER — TELEPHONE (OUTPATIENT)
Dept: FAMILY MEDICINE | Facility: CLINIC | Age: 57
End: 2017-06-05

## 2017-06-05 DIAGNOSIS — H26.9 CATARACT, RIGHT: Primary | ICD-10-CM

## 2017-06-05 DIAGNOSIS — Z96.1 PSEUDOPHAKIA, LEFT EYE: ICD-10-CM

## 2017-06-05 PROCEDURE — 99213 OFFICE O/P EST LOW 20 MIN: CPT | Mod: ZF

## 2017-06-05 PROCEDURE — 92015 DETERMINE REFRACTIVE STATE: CPT | Mod: GY,ZF

## 2017-06-05 ASSESSMENT — VISUAL ACUITY
OD_SC: 20/40
OD_PH_SC: 20/30-1
OS_PH_SC: 20/40-1
METHOD: SNELLEN - LINEAR
OD_SC+: -1
OS_SC+: +1
OS_SC: 20/70

## 2017-06-05 ASSESSMENT — TONOMETRY
OD_IOP_MMHG: 22
OS_IOP_MMHG: 20
IOP_METHOD: TONOPEN

## 2017-06-05 ASSESSMENT — REFRACTION_MANIFEST
OS_AXIS: 080
OD_AXIS: 175
OD_SPHERE: PLANO
OS_CYLINDER: +1.75
OS_ADD: +2.50
OS_SPHERE: -1.75
OD_ADD: +2.50
OD_CYLINDER: +0.50

## 2017-06-05 ASSESSMENT — SLIT LAMP EXAM - LIDS
COMMENTS: NORMAL
COMMENTS: NORMAL

## 2017-06-05 ASSESSMENT — EXTERNAL EXAM - RIGHT EYE: OD_EXAM: NORMAL

## 2017-06-05 ASSESSMENT — EXTERNAL EXAM - LEFT EYE: OS_EXAM: NORMAL

## 2017-06-05 ASSESSMENT — CUP TO DISC RATIO
OD_RATIO: 0.3
OS_RATIO: 0.3

## 2017-06-05 NOTE — TELEPHONE ENCOUNTER
Incoming call from pt's group home wondering if pt's most recent visit with Usman can count as a pre-op. Pt is having eye surgery on 06/30/17.   Please follow up. Contact info: [phone], Alva Valenzuela

## 2017-06-05 NOTE — TELEPHONE ENCOUNTER
Routing to provider - Usman - please review and advise as appropriate  1. Writer called and advised pre-op is appropriate per last office visit did not address post op eye or preop  2. They want to use last office visit and last pre-op as OK for right eye cataract surgery schedule for the end of June      Thank you,  Gaye Umana RN

## 2017-06-05 NOTE — MR AVS SNAPSHOT
After Visit Summary   6/5/2017    Nena Tang    MRN: [id number]           Patient Information     Date Of Birth          1960        Visit Information        Provider Department      6/5/2017 2:00 PM Tyra Diaz MD [facility]        Today's Diagnoses     Cataract, right    -  1    Pseudophakia, left eye           Follow-ups after your visit        Follow-up notes from your care team     Return for scheduled procedure.      Your next 10 appointments already scheduled     Jun 30, 2017 10:00 AM CDT   Return Sleep Patient with William Eng MD   [facility] ([facility]    [address]  [facility] [address]   [phone]            Jun 30, 2017 11:00 AM CDT   Return Visit with Usman Hodgson PA-C   [facility] ([facility])    [address] [place] [address]  [address]  [facility] [address]   [phone]            Aug 02, 2017  2:00 PM CDT   Post-Op with Tyra Diaz MD   [facility] ([facility])    [address]  [address]   [address]  [facility] [address]   [phone]            Nov 15, 2017 10:15 AM CST   RETURN RETINA with Tiburcio Chacon MD   Eye Clinic ([facility])    [address]  [address]   [address]  [facility] [address]   [phone]              Who to contact     Please call your clinic at [phone] to:    Ask questions about your health    Make or cancel appointments    Discuss your medicines    Learn about your test results    Speak to your doctor   If you have compliments or concerns about an experience at your clinic, or if you wish to file a complaint, please contact [facility] Patient Relations at [phone] or email us at [email or web address]         Additional Information About Your Visit        Flores  Information     [employer] gives you secure access to your electronic health record. If you see a primary care provider, you can also send messages to your care team and make appointments. If you have questions, please call your primary care clinic.  If you do not have a primary care provider, please call [phone] and they will assist you.      [employer] is an electronic gateway that provides easy, online access to your medical records. With [employer], you can request a clinic appointment, read your test results, renew a prescription or communicate with your care team.     To access your existing account, please contact your [facility] or call [phone] for assistance.        Care EveryWhere ID     This is your Care EveryWhere ID. This could be used by other organizations to access your [facility] medical records  [id number]        Your Vitals Were     Last Period                   01/06/2015            Blood Pressure from Last 3 Encounters:   06/02/17 138/82   05/18/17 118/79   05/05/17 108/72    Weight from Last 3 Encounters:   06/02/17 102.5 kg (226 lb)   05/18/17 100.6 kg (221 lb 12.5 oz)   05/05/17 100.7 kg (222 lb)              We Performed the Following     Lin-Operative Worksheet          Today's Medication Changes          These changes are accurate as of: 6/5/17  3:46 PM.  If you have any questions, ask your nurse or doctor.               These medicines have changed or have updated prescriptions.        Dose/Directions    omeprazole 40 MG capsule   Commonly known as:  priLOSEC   This may have changed:  when to take this   Used for:  Gastroesophageal reflux disease without esophagitis        Dose:  40 mg   Take 1 capsule (40 mg) by mouth daily   Quantity:  30 capsule   Refills:  5                Primary Care Provider    None Specified       No primary provider on file.        Goals        General    Medical (pt-stated)     Notes - Note created  7/7/2016  9:15 AM by Ashok  Chelsea EDWARD RN    Get blood sugars under control    Improve medication compliance    As of today's date 7/7/2016 goal is met at 0 - 25%.   Goal Status:  Active          Thank you!     Thank you for choosing EYE CLINIC  for your care. Our goal is always to provide you with excellent care. Hearing back from our patients is one way we can continue to improve our services. Please take a few minutes to complete the written survey that you may receive in the mail after your visit with us. Thank you!             Your Updated Medication List - Protect others around you: Learn how to safely use, store and throw away your medicines at www.disposemymeds.org.          This list is accurate as of: 6/5/17  3:46 PM.  Always use your most recent med list.                   Brand Name Dispense Instructions for use    acetaminophen 500 MG tablet    TYLENOL    100 tablet    Take 2 tablets (1,000 mg) by mouth every 6 hours as needed for pain       aspirin 81 MG EC tablet    ASPIRIN LOW DOSE    100 tablet    Take 1 tablet (81 mg) by mouth daily       atorvastatin 80 MG tablet    LIPITOR    60 tablet    TAKE 1 TABLET (80MG) BY MOUTH DAILY       benztropine 1 MG tablet    COGENTIN    60 tablet    Take 1 tablet (1 mg) by mouth 2 times daily       blood glucose monitoring lancets     1 Box    Use to test blood sugar 2 times daily or as directed.  Ok to substitute alternative if insurance prefers.       citalopram 20 MG tablet    celeXA    30 tablet    Take 1 tablet (20 mg) by mouth daily       DOK PLUS 8.6-50 MG per tablet   Generic drug:  senna-docusate     100 tablet    TAKE 1 TABLET BY MOUTH TWICE A DAY AS NEEDED FOR CONSTIPATION       gabapentin 300 MG capsule    NEURONTIN    168 capsule    TAKE 2 CAPSULES (600MG) BY MOUTH THREE TIMES A DAY       glucose 4 G Chew chewable tablet     20 tablet    Take 2 every 15 minutes for blood sugar <70mg/dL. Recheck blood sugar every 15 minutes until above 70mg/dL, then eat a substantial meal.        haloperidol 5 MG tablet    HALDOL    30 tablet    Take 1 tablet (5 mg) by mouth At Bedtime       hydrocortisone 1 % ointment     30 g    Apply sparingly to affected area three times daily for 14 days.       ibuprofen 600 MG tablet    ADVIL/MOTRIN    60 tablet    Take 1 tablet (600 mg) by mouth every 4 hours as needed for moderate pain       LANTUS SOLOSTAR 100 UNIT/ML injection   Generic drug:  insulin glargine     15 mL    INJECT 45 UNITS SUBCUTANEOUSLY EVERY NIGHT AT BEDTIME       levofloxacin 0.5 % ophthalmic solution    QUIXIN    2 Bottle    1 drop in surgical eye as directed - 4x daily for 1 week, then stop       losartan 100 MG tablet    COZAAR    90 tablet    TAKE 1 TABLET (100MG) BY MOUTH DAILY       melatonin 5 MG Caps     90 capsule    Take 1 capsule by mouth daily At dinnertime       metoprolol 100 MG 24 hr tablet    TOPROL-XL    60 tablet    TAKE 1 TABLET (100MG) BY MOUTH DAILY       NovoLOG FLEXPEN 100 UNIT/ML injection   Generic drug:  insulin aspart     15 mL    INJECT 20 UNITS SUBCUTANEOUSLY THREE TIMES A DAY (WITH MEALS), INJECT AN EXTRA 7 UNITS ONCE DAILY FOR BLOOD SUGAR OVER 500       omeprazole 40 MG capsule    priLOSEC    30 capsule    Take 1 capsule (40 mg) by mouth daily       ONE TOUCH ULTRA test strip   Generic drug:  blood glucose monitoring     150 strip    TEST TWICE DAILY AS DIRECTED.       * order for DME     1 Device    Equipment being ordered: Rolling walker       * order for DME     1 each    Hold Novolog if meals are refused.       polyethylene glycol powder    MIRALAX/GLYCOLAX    527 g    TAKE 17 GRAMS (1 CAPFUL) BY MOUTH DAILY       prednisoLONE acetate 1 % ophthalmic susp    PRED FORTE    2 Bottle    1 drop in surgical eye as directed, 4x daily after surgery for 1 week, 3x daily for 1 week, 2x daily for 1 week, daily for 1 week, then stop       TRULICITY 1.5 MG/0.5ML pen   Generic drug:  dulaglutide     2 mL    INJECT 1.5MG SUBCUTANEOUSLY EVERY SEVEN DAYS       ULTICARE MINI 31G  X 6 MM   Generic drug:  insulin pen needle     100 each    USE 4 DAILY OR AS DIRECTED       vitamin D3 05840 UNITS capsule    CHOLECALCIFEROL    4 capsule    TAKE 1 CAPSULE (50,000 UNITS) BY MOUTH ONCE A WEEK       * Notice:  This list has 2 medication(s) that are the same as other medications prescribed for you. Read the directions carefully, and ask your doctor or other care provider to review them with you.

## 2017-06-05 NOTE — NURSING NOTE
Chief Complaints and History of Present Illnesses   Patient presents with     Post Op (Ophthalmology) Left Eye     one month s/p CE/IOL (5/5/17)     HPI    Affected eye(s):  Left   Symptoms:     No decreased vision   Difficulty with reading   No floaters   No flashes      Duration:  1 month      Do you have eye pain now?:  No      Comments:  Pt here for one month POP left eye CE/IOL (5/5/17)  Pt states vision has improved since the surgery but notes difficulty with up close vision    Pt done with POP drops    Fasting BS was 174 today  Lab Results       Component                Value               Date                       A1C                      7.2                 03/28/2017                 A1C                      7.1                 01/27/2017                 A1C                      9.7                 11/11/2016                 A1C                      9.3                 08/11/2016                 A1C                      9.8                 05/23/2016       Tyra Ryder COA 2:42 PM June 5, 2017

## 2017-06-05 NOTE — PROGRESS NOTES
\HPI: Nena Tang is a 57 year old female here for follow-up after CE/IOL OS. She has noted the improvement in the left eye and is now interested in right eye surgery.    Does have history of type 2 diabetes mellitus x 13 years. Takes PO meds and insulin. Most recent A1c 7.2 in 3/28/17. Denies vision loss, eye pain, flashes, new floaters.    IOL calculations were obtain 7/2016.    POHx:  Mild NPDR, both eyes  Myopia, left eye with secondary amblyopia  No trauma/eye surgery  No flomax    Current Eye Medications:   None    Assessment & Plan   (Z96.1) Pseudophakia, left eye  Comment: Good post-op appearance. Vision limited by retinal changes and refractive amblyopia. Residual astigmatism as expected.  Plan: Complete drop taper.    (H26.9) Cataract, right  Comment: BCVA right eye of 20/30, but not that she has seen improvement with her left eye, she notes the blurring on the right and would like to discuss CE/IOL.    Dilates to: 7.5 mm  Alpha blockers/Flomax: None  Trauma/Pseudoxfoliation: None  Fuchs dystrophy/guttae: None    Diabetes: YES, with retinopathy  Anticoagulation: None    Cyl: OD 0.32 @ 81    We discussed the risks and benefits of cataract surgery in the right eye, and informed consent was obtained.  Proceed with CE/IOL OD. 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 On insulin and oral hyoglycemics. Following with Dr. Chacon.  Plan: Discussed the importance of tight blood glucose control in the prevention of diabetic retinopathy. Recommend yearly dilated eye exam.    (H53.022) Refractive amblyopia, left with (H52.12) Myopia, left eye  Comment: High myopia left eye with refractive amblyopia on the left. Hold off on new glasses Rx until after CE/IOL OD  Plan: See above    Return for scheduled procedure.      Teaching statement:  Complete  documentation of historical and exam elements from today's encounter can be found in the full encounter summary report (not reduplicated in this progress note). I personally obtained the chief complaint(s) and history of present illness.  I confirmed and edited as necessary the review of systems, past medical/surgical history, family history, social history, and examination findings as documented by others; and I examined the patient myself. I personally reviewed the relevant tests, images, and reports as documented above.     I formulated and edited as necessary the assessment and plan and discussed the findings and management plan with the patient and family.    Tyra Diaz MD  Comprehensive Ophthalmology & Ocular Pathology  Department of Ophthalmology and Visual Neurosciences  [email or web address]  Pager [phone]

## 2017-06-07 NOTE — TELEPHONE ENCOUNTER
No PE was performed at the last office visit.  It is up to the Ophthalmologist if the previous Pre-Op PE will serve.  Mey TAYLOR

## 2017-06-08 ENCOUNTER — TELEPHONE (OUTPATIENT)
Dept: FAMILY MEDICINE | Facility: CLINIC | Age: 57
End: 2017-06-08

## 2017-06-08 ENCOUNTER — OFFICE VISIT (OUTPATIENT)
Dept: FAMILY MEDICINE | Facility: CLINIC | Age: 57
End: 2017-06-08
Payer: COMMERCIAL

## 2017-06-08 VITALS
DIASTOLIC BLOOD PRESSURE: 68 MMHG | BODY MASS INDEX: 44.04 KG/M2 | RESPIRATION RATE: 16 BRPM | HEART RATE: 88 BPM | WEIGHT: 225.5 LBS | OXYGEN SATURATION: 94 % | TEMPERATURE: 98.1 F | SYSTOLIC BLOOD PRESSURE: 110 MMHG

## 2017-06-08 DIAGNOSIS — Z01.818 PREOP GENERAL PHYSICAL EXAM: Primary | ICD-10-CM

## 2017-06-08 DIAGNOSIS — K21.9 GASTROESOPHAGEAL REFLUX DISEASE WITHOUT ESOPHAGITIS: ICD-10-CM

## 2017-06-08 PROCEDURE — 99214 OFFICE O/P EST MOD 30 MIN: CPT | Performed by: PHYSICIAN ASSISTANT

## 2017-06-08 NOTE — PROGRESS NOTES
[facility] PRIMARY CARE  [address]  [address]  [facility] [address]  [phone]  Dept: [phone]    PRE-OP EVALUATION:  Today's date: 2017    Nena Tang (: 1960) presents for pre-operative evaluation assessment as requested by Dr. Tyra Diaz.  She requires evaluation and anesthesia risk assessment prior to undergoing surgery/procedure for treatment of PHACOEMULSIFICATION CLEAR CORNEA WITH STANDARD INTRAOCULAR LENS IMPLANT .  Proposed procedure: PHACOEMULSIFICATION CLEAR CORNEA WITH STANDARD INTRAOCULAR LENS IMPLANT    Date of Surgery/ Procedure: 2017  Time of Surgery/ Procedure: 7:30AM  Hospital/Surgical Facility: [facility]    Primary Physician: No primary care provider on file.  Type of Anesthesia Anticipated: Combined MAC with Topical    Patient has a Health Care Directive or Living Will:  NO    1. Yes- Do you have a history of heart attack, stroke, stent, bypass or surgery on an artery in the head, neck, heart or legs? Heart attack 4-5 years ago   2. NO - Do you ever have any pain or discomfort in your chest?  3. NO - Do you have a history of  Heart Failure?  4. YES - ARE YOUR TROUBLED BY SHORTNESS OF BREATH WHEN WALKING ON THE LEVEL, UP A SLIGHT HILL OR AT NIGHT?   5. NO - Do you currently have a cold, bronchitis or other respiratory infection?  6. NO - Do you have a cough, shortness of breath or wheezing?  7. YES - Do you sometimes get pains in the calves of your legs when you walk?  8. NO - Do you or anyone in your family have previous history of blood clots?  9. NO - Do you or does anyone in your family have a serious bleeding problem such as prolonged bleeding following surgeries or cuts?  10. NO - Have you ever had problems with anemia or been told to take iron pills?  11. NO - Have you had any abnormal blood loss such as black, tarry or bloody stools, or abnormal vaginal bleeding?  12. NO - Have you ever had a blood  transfusion?  13. NO - Have you or any of your relatives ever had problems with anesthesia?  14. YES- Do you have sleep apnea, excessive snoring or daytime drowsiness?  15. NO - Do you have any prosthetic heart valves?  16. NO - Do you have prosthetic joints?  17. NO - Is there any chance that you may be pregnant?        HPI:                                                      Brief HPI related to upcoming procedure: Patient presents for replacement of Lens right eye.        DIABETES - Patient has a longstanding history of DiabetesType Type II . Patient is being treated with insulin injections and denies significant side effects. Control has been fair. Complicating factors include but are not limited to: eye, obesity, and sedentary.                                                                                                             .    MEDICAL HISTORY:                                                      Patient Active Problem List    Diagnosis Date Noted     CAD (coronary artery disease) 02/29/2012     Priority: High      Tako-Tsubo stress cardiomyopathy 2009. Mild coronary artery disease,        Moderate major depression (H) 06/08/2011     Priority: High     Psychophysiological insomnia 03/09/2017     Priority: Medium     Depression 12/28/2016     Priority: Medium     Schizoaffective disorder (H) 12/20/2016     Priority: Medium     History of uterine cancer 12/07/2016     Priority: Medium     Yearly pap's per the provider office visit note on 12/07/16.       Falls frequently 08/18/2016     Priority: Medium     Left cataract 07/25/2016     Priority: Medium     Alcohol use 04/19/2016     Priority: Medium     Tardive dyskinesia 09/11/2015     Priority: Medium     Mild nonproliferative diabetic retinopathy (H) 06/19/2014     Priority: Medium     Problem list name updated by automated process. Provider to review       Esophageal reflux 06/09/2014     Priority: Medium     Suicidal ideation 05/01/2014      Priority: Medium     Cocaine abuse, episodic 10/03/2013     Priority: Medium     Lumbago 09/20/2013     Priority: Medium     Cervicalgia 09/20/2013     Priority: Medium     Health Care Home 08/16/2013     Priority: Medium     EMERGENCY CARE PLAN  Presenting Problem Signs and Symptoms Treatment Plan    Questions or concerns during clinic hours    I will call the clinic directly     Questions or concerns outside clinic hours    I will call the 24 hour nurse line at [phone]    Patient needs to schedule an appointment    I will call the 24 hour scheduling team at [phone] or clinic directly    Same day treatment     I will call the clinic first, nurse line if after hours, urgent care and express care if needed     Primary Care Provider Dr. Honorio Dias [facility] [phone]  Nurse Care Coordinator Idalmis Nettles [phone]        HTN, goal below 140/90 07/29/2013     Priority: Medium     Migraine headache 04/22/2013     Priority: Medium     Schizoaffective disorder, depressive type (H) 02/25/2013     Priority: Medium     Chronic low back pain 01/22/2013     Priority: Medium     Verbal auditory hallucination 10/04/2012     Priority: Medium     CINDY (obstructive sleep apnea)- mild AHI 10.3 03/08/2012     Priority: Medium     Sleep study 3/12 (198#)-   AHI 10.3, with significant desaturations down to 74%. RDI 10.3. Periodic Limb Movement Index 3.2/hour.         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 Echo 2010, angio 2011 with normal LVF.        Vitamin  B12 deficiency without anemia 11/23/2009     Priority: Low     Diagnosis updated by automated process. Provider to review and confirm.       Osteopenia 10/07/2009     Priority: Low     Dexa 2009- Lumbar Spine (L1-L4): T-score -1.8, Left Femoral Neck: T-score -1.0, Right Femoral Neck: T-score -1.0             Past Medical History:   Diagnosis Date     CAD (coronary artery disease)     5/2014 cath, nonbostructive stenosis to LAD, RCA.     Chronic low back pain 1/22/2013     Cocaine abuse, in remission      Fecal urgency 3/8/2012     History of heroin abuse      Hyperlipidemia LDL goal <100 10/31/2010     Hypertension 7/29/2013     Illiterate 8/30/2011     Irritable bowel syndrome      Migraine 4/19/2012     Migraine headache 4/22/2013     Moderate major depression (H) 6/8/2011     Noncompliance with medication regimen 6/8/2011     Obesity      CINDY (obstructive sleep apnea) 3/8/2012    uses CPAP     Osteopenia 10/7/2009     Schizoaffective disorder, depressive type (H) 2/25/2013     Suicidal intent 10/2/2013     Takotsubo cardiomyopathy      Type 2 diabetes mellitus (H) 8/30/2011     Uterine cancer (H) 1983     Verbal auditory hallucination 10/4/2012     Past Surgical History:   Procedure Laterality Date     C OOPHORECTORMY FOR MALIG, W/BX  1983    UTERINE     COLONOSCOPY N/A 3/16/2017    Procedure: COLONOSCOPY;  Surgeon: Traci Gonzalez MD;  Location:  GI     Coronary CTA  5/21/2014     HYSTERECTOMY  1983    uterine cancer yearly pap's per provider.     LAPAROSCOPIC CHOLECYSTECTOMY  2008     PHACOEMULSIFICATION CLEAR CORNEA WITH STANDARD INTRAOCULAR LENS IMPLANT Left 5/5/2017    Procedure: PHACOEMULSIFICATION CLEAR CORNEA WITH STANDARD INTRAOCULAR LENS IMPLANT;  LEFT EYE PHACOEMULSIFICATION CLEAR CORNEA WITH STANDARD INTRAOCULAR LENS IMPLANT ;  Surgeon: Tyra Diaz MD;  Location: [facility]     RELEASE TRIGGER FINGER  10/11/2012    Left thumb. Procedure: RELEASE TRIGGER FINGER;  LEFT THUMB TRIGGER  RELEASE;  Surgeon: Tay Langley MD;  Location:  SD     RELEASE TRIGGER FINGER Right 12/26/2016    Procedure: RELEASE TRIGGER FINGER;  Surgeon: Albino Castañeda MD;  Location:  OR     Current Outpatient Prescriptions   Medication Sig Dispense Refill     ranitidine (ZANTAC) 300 MG tablet Take 1 tablet (300 mg) by mouth At Bedtime 30 tablet 1     hydrocortisone 1 % ointment Apply sparingly to affected area three times daily for 14 days. 30 g 0     vitamin D3 (CHOLECALCIFEROL) 95910 UNITS capsule TAKE 1 CAPSULE (50,000 UNITS) BY MOUTH ONCE A WEEK 4 capsule 3     DOK PLUS 50-8.6 MG per tablet TAKE 1 TABLET BY MOUTH TWICE A DAY AS NEEDED FOR CONSTIPATION 100 tablet 3     losartan (COZAAR) 100 MG tablet TAKE 1 TABLET (100MG) BY MOUTH DAILY 90 tablet 1     atorvastatin (LIPITOR) 80 MG tablet TAKE 1 TABLET (80MG) BY MOUTH DAILY 60 tablet 1     metoprolol (TOPROL-XL) 100 MG 24 hr tablet TAKE 1 TABLET (100MG) BY MOUTH DAILY 60 tablet 1     gabapentin (NEURONTIN) 300 MG capsule TAKE 2 CAPSULES (600MG) BY MOUTH THREE TIMES A  capsule 3     TRULICITY 1.5 MG/0.5ML pen INJECT 1.5MG SUBCUTANEOUSLY EVERY SEVEN DAYS 2 mL 11     levofloxacin (QUIXIN) 0.5 % ophthalmic solution 1 drop in surgical eye as directed - 4x daily for 1 week, then stop 2 Bottle 1     prednisoLONE acetate (PRED FORTE) 1 % ophthalmic susp 1 drop in surgical eye as directed, 4x daily after surgery for 1 week, 3x daily for 1 week, 2x daily for 1 week, daily for 1 week, then stop 2 Bottle 1     ONE TOUCH ULTRA test strip TEST TWICE DAILY AS DIRECTED. 150 strip 11     polyethylene glycol (MIRALAX/GLYCOLAX) powder TAKE 17 GRAMS (1 CAPFUL) BY MOUTH DAILY 527 g 3     NOVOLOG FLEXPEN 100 UNIT/ML soln INJECT 20 UNITS SUBCUTANEOUSLY THREE TIMES A DAY (WITH MEALS), INJECT AN EXTRA 7 UNITS ONCE DAILY FOR BLOOD SUGAR OVER 500 15 mL 3     ULTICARE MINI 31G X 6 MM insulin pen needle USE 4 DAILY OR AS DIRECTED 100 each 11     LANTUS SOLOSTAR 100 UNIT/ML soln  INJECT 45 UNITS SUBCUTANEOUSLY EVERY NIGHT AT BEDTIME 15 mL 3     aspirin (ASPIRIN LOW DOSE) 81 MG EC tablet Take 1 tablet (81 mg) by mouth daily 100 tablet 4     acetaminophen (TYLENOL) 500 MG tablet Take 2 tablets (1,000 mg) by mouth every 6 hours as needed for pain 100 tablet 0     ibuprofen (ADVIL,MOTRIN) 600 MG tablet Take 1 tablet (600 mg) by mouth every 4 hours as needed for moderate pain 60 tablet 0     melatonin 5 MG CAPS Take 1 capsule by mouth daily At dinnertime 90 capsule 1     glucose 4 G CHEW Take 2 every 15 minutes for blood sugar <70mg/dL. Recheck blood sugar every 15 minutes until above 70mg/dL, then eat a substantial meal. 20 tablet 1     order for DME Hold Novolog if meals are refused. 1 each 0     blood glucose monitoring (ONE TOUCH DELICA) lancets Use to test blood sugar 2 times daily or as directed.  Ok to substitute alternative if insurance prefers. 1 Box prn     citalopram (CELEXA) 20 MG tablet Take 1 tablet (20 mg) by mouth daily 30 tablet 2     haloperidol (HALDOL) 5 MG tablet Take 1 tablet (5 mg) by mouth At Bedtime 30 tablet 2     benztropine (COGENTIN) 1 MG tablet Take 1 tablet (1 mg) by mouth 2 times daily 60 tablet 3     order for DME Equipment being ordered: Rolling walker 1 Device 0     OTC products: None, except as noted above    Allergies   Allergen Reactions     Imidazole Antifungals Hives     Tolerates diflucan     Ketoprofen Itching     Pruritis to topical     Lisinopril Hives     Metformin Other (See Comments)     Patient hospitalized for lactic acidosis - admitting provider suspectd caused by metformin     Metronidazole Hives     Posaconazole Hives     Tolerates diflucan      Latex Allergy: NO    Social History   Substance Use Topics     Smoking status: Never Smoker     Smokeless tobacco: Never Used     Alcohol use No      Comment: last month     History   Drug Use No     Comment: history of       REVIEW OF SYSTEMS:                                                    C:  NEGATIVE for fever, chills, change in weight  E/M: NEGATIVE for ear, mouth and throat problems  R: NEGATIVE for significant cough or SOB  CV: NEGATIVE for chest pain, palpitations or peripheral edema  PSYCHIATRIC: NEGATIVE for changes in mood or affect  ROS otherwise negative    EXAM:                                                    /68  Pulse 88  Temp 98.1  F (36.7  C) (Oral)  Resp 16  Wt 225 lb 8 oz (102.3 kg)  LMP 01/06/2015  SpO2 94%  BMI 44.04 kg/m2  GENERAL APPEARANCE: healthy, alert and no distress  HENT: ear canals and TM's normal and nose and mouth without ulcers or lesions  RESP: lungs clear to auscultation - no rales, rhonchi or wheezes  CV: regular rate and rhythm, normal S1 S2, no S3 or S4 and no murmur, click or rub   ABDOMEN: soft, nontender, no HSM or masses and bowel sounds normal  NEURO: Normal strength and tone, sensory exam grossly normal, mentation intact and speech normal, Tardive dyskinesia evident in tongue lolling.    DIAGNOSTICS:                                                    No labs or EKG required for low risk surgery (cataract, skin procedure, breast biopsy, etc)    Recent Labs   Lab Test  03/28/17   1114  01/27/17   1211  01/24/17   1957  01/22/17   1936   11/11/16   2234   08/29/16 2235   HGB   --    --   10.7*  11.0*   < >  10.2*   < >  11.0*   PLT   --    --   223  217   < >  199   < >  203   INR   --    --    --    --    --   Unsatisfactory specimen - tube underfilled  SPOKE WITH DR VARELA [id number], LY     --   0.86   NA   --    --   141  139   < >  139   < >  139   POTASSIUM   --    --   4.6  4.1   < >  4.5   < >  4.3   CR   --    --   0.94  0.68   < >  0.96   < >  0.66   A1C  7.2*  7.1*   --    --    --   9.7*   --    --     < > = values in this interval not displayed.        IMPRESSION:                                                    The proposed surgical procedure is considered LOW risk.    REVISED CARDIAC RISK INDEX  The patient has the following  serious cardiovascular risks for perioperative complications such as (MI, PE, VFib and 3  AV Block):  Diabetes Mellitus (on Insulin)  INTERPRETATION: 0 risks: Class I (very low risk - 0.4% complication rate)    The patient has the following additional risks for perioperative complications:  No identified additional risks      ICD-10-CM    1. Preop general physical exam Z01.818    2. Gastroesophageal reflux disease without esophagitis K21.9 ranitidine (ZANTAC) 300 MG tablet       RECOMMENDATIONS:                                                      --Patient is to take all scheduled medications on the day of surgery    APPROVAL GIVEN to proceed with proposed procedure, without further diagnostic evaluation       Signed Electronically by: Usman Hodgson PA-C    Copy of this evaluation report is provided to requesting physician.    Jey Preop Guidelines

## 2017-06-08 NOTE — TELEPHONE ENCOUNTER
Incoming call from [place] pharmacy requesting clarification on ranitidine (ZANTAC) 300 MG. Pharmacy would like to know if Omeprazole needs to be discontinued or if pt is on both medications.     Val Valenzuela

## 2017-06-08 NOTE — NURSING NOTE
Chief Complaint   Patient presents with     Pre-Op Exam       Initial /68  Pulse 88  Temp 98.1  F (36.7  C) (Oral)  Resp 16  Wt 225 lb 8 oz (102.3 kg)  LMP 01/06/2015  SpO2 94%  BMI 44.04 kg/m2 Estimated body mass index is 44.04 kg/(m^2) as calculated from the following:    Height as of 5/18/17: 5' (1.524 m).    Weight as of this encounter: 225 lb 8 oz (102.3 kg).  Medication Reconciliation: complete   Lio Wilder MA

## 2017-06-08 NOTE — TELEPHONE ENCOUNTER
After Visit Summary signed per provider and Faxed to patient group home with instructions pertaining to intentional hold of Aspirin before upcoming procedure. No further action needed.     Thanks! Aniya Baron RN

## 2017-06-08 NOTE — MR AVS SNAPSHOT
After Visit Summary   6/8/2017    Nena Tang    MRN: [id number]           Patient Information     Date Of Birth          1960        Visit Information        Provider Department      6/8/2017 11:00 AM Usman Hodgson PA-C [facility]        Today's Diagnoses     Preop general physical exam    -  1    Gastroesophageal reflux disease without esophagitis          Care Instructions      Before Your Surgery      Call your surgeon if there is any change in your health. This includes signs of a cold or flu (such as a sore throat, runny nose, cough, rash or fever).    Do not smoke, drink alcohol or take over the counter medicine (unless your surgeon or primary care doctor tells you to) for the 24 hours before and after surgery.    If you take prescribed drugs: Follow your doctor s orders about which medicines to take and which to stop until after surgery.  Stop Aspirin 7 days prior to the procedure.     Eating and drinking prior to surgery: follow the instructions from your surgeon    Take a shower or bath the night before surgery. Use the soap your surgeon gave you to gently clean your skin. If you do not have soap from your surgeon, use your regular soap. Do not shave or scrub the surgery site.  Wear clean pajamas and have clean sheets on your bed.             Follow-ups after your visit        Your next 10 appointments already scheduled     Jun 28, 2017 10:30 AM CDT   DX HIP/PELVIS/SPINE with RIDX1   [facility] ([facility]    [address]  [address]  [facility] [address]              Please do not take any of the following 24 hours prior to the day of your exam: vitamins, calcium tablets, antacids.            Jun 30, 2017   Procedure with Tyra Diaz MD   [facility] Per[facility] (--)    [address]  [facility] [id number] [phone]            Jul 06, 2017  8:30 AM CDT    Return Sleep Patient with William Eng MD   [facility] ([facility])    [address] [place] [address]  [facility] [id number] [phone]            Aug 02, 2017  2:00 PM CDT   Post-Op with Tyra Diaz MD   [facility] ([facility])    [address]  [address]   [address]  [facility] [address] [phone]            Nov 15, 2017 10:15 AM CST   RETURN RETINA with Tiburcio Chacon MD   Eye Clinic ([facility])    [address]  [address]   [address]  [facility] [id number] [phone]              Who to contact     If you have questions or need follow up information about today's clinic visit or your schedule please contact [facility] directly at [phone].  Normal or non-critical lab and imaging results will be communicated to you by Crucellhart, letter or phone within 4 business days after the clinic has received the results. If you do not hear from us within 7 days, please contact the clinic through Insighterat or phone. If you have a critical or abnormal lab result, we will notify you by phone as soon as possible.  Submit refill requests through 2Peer (Qlipso) or call your pharmacy and they will forward the refill request to us. Please allow 3 business days for your refill to be completed.          Additional Information About Your Visit        CrucellharWikets Information     2Peer (Qlipso) gives you secure access to your electronic health record. If you see a primary care provider, you can also send messages to your care team and make appointments. If you have questions, please call your primary care clinic.  If you do not have a primary care provider, please call [phone] and they will assist you.        Care EveryWhere ID     This is your Care EveryWhere ID. This could be used by other organizations to access your [place] medical records  [id number]         Your Vitals Were     Pulse Temperature Respirations Last Period Pulse Oximetry BMI (Body Mass Index)    88 98.1  F (36.7  C) (Oral) 16 01/06/2015 94% 44.04 kg/m2       Blood Pressure from Last 3 Encounters:   06/08/17 110/68   06/02/17 138/82   05/18/17 118/79    Weight from Last 3 Encounters:   06/08/17 225 lb 8 oz (102.3 kg)   06/02/17 226 lb (102.5 kg)   05/18/17 221 lb 12.5 oz (100.6 kg)              Today, you had the following     No orders found for display         Today's Medication Changes          These changes are accurate as of: 6/8/17  4:09 PM.  If you have any questions, ask your nurse or doctor.               Start taking these medicines.        Dose/Directions    ranitidine 300 MG tablet   Commonly known as:  ZANTAC   Used for:  Gastroesophageal reflux disease without esophagitis   Started by:  Usman Hodgson PA-C        Dose:  300 mg   Take 1 tablet (300 mg) by mouth At Bedtime   Quantity:  30 tablet   Refills:  1         Stop taking these medicines if you haven't already. Please contact your care team if you have questions.     omeprazole 40 MG capsule   Commonly known as:  priLOSEC   Stopped by:  Usman Hodgson PA-C                Where to get your medicines      These medications were sent to [address] [address]     Phone:  [phone]     ranitidine 300 MG tablet                Primary Care Provider    None Specified       No primary provider on file.        Goals        General    Medical (pt-stated)     Notes - Note created  7/7/2016  9:15 AM by Chelsea Pulido, RN    Get blood sugars under control    Improve medication compliance    As of today's date 7/7/2016 goal is met at 0 - 25%.   Goal Status:  Active          Thank you!     Thank you for choosing [facility]  for your care. Our goal is always to provide you with excellent care. Hearing  back from our patients is one way we can continue to improve our services. Please take a few minutes to complete the written survey that you may receive in the mail after your visit with us. Thank you!             Your Updated Medication List - Protect others around you: Learn how to safely use, store and throw away your medicines at www.disposemymeds.org.          This list is accurate as of: 6/8/17  4:09 PM.  Always use your most recent med list.                   Brand Name Dispense Instructions for use    acetaminophen 500 MG tablet    TYLENOL    100 tablet    Take 2 tablets (1,000 mg) by mouth every 6 hours as needed for pain       aspirin 81 MG EC tablet    ASPIRIN LOW DOSE    100 tablet    Take 1 tablet (81 mg) by mouth daily       atorvastatin 80 MG tablet    LIPITOR    60 tablet    TAKE 1 TABLET (80MG) BY MOUTH DAILY       benztropine 1 MG tablet    COGENTIN    60 tablet    Take 1 tablet (1 mg) by mouth 2 times daily       blood glucose monitoring lancets     1 Box    Use to test blood sugar 2 times daily or as directed.  Ok to substitute alternative if insurance prefers.       citalopram 20 MG tablet    celeXA    30 tablet    Take 1 tablet (20 mg) by mouth daily       DOK PLUS 8.6-50 MG per tablet   Generic drug:  senna-docusate     100 tablet    TAKE 1 TABLET BY MOUTH TWICE A DAY AS NEEDED FOR CONSTIPATION       gabapentin 300 MG capsule    NEURONTIN    168 capsule    TAKE 2 CAPSULES (600MG) BY MOUTH THREE TIMES A DAY       glucose 4 G Chew chewable tablet     20 tablet    Take 2 every 15 minutes for blood sugar <70mg/dL. Recheck blood sugar every 15 minutes until above 70mg/dL, then eat a substantial meal.       haloperidol 5 MG tablet    HALDOL    30 tablet    Take 1 tablet (5 mg) by mouth At Bedtime       hydrocortisone 1 % ointment     30 g    Apply sparingly to affected area three times daily for 14 days.       ibuprofen 600 MG tablet    ADVIL/MOTRIN    60 tablet    Take 1 tablet (600 mg) by mouth  every 4 hours as needed for moderate pain       LANTUS SOLOSTAR 100 UNIT/ML injection   Generic drug:  insulin glargine     15 mL    INJECT 45 UNITS SUBCUTANEOUSLY EVERY NIGHT AT BEDTIME       levofloxacin 0.5 % ophthalmic solution    QUIXIN    2 Bottle    1 drop in surgical eye as directed - 4x daily for 1 week, then stop       losartan 100 MG tablet    COZAAR    90 tablet    TAKE 1 TABLET (100MG) BY MOUTH DAILY       melatonin 5 MG Caps     90 capsule    Take 1 capsule by mouth daily At dinnertime       metoprolol 100 MG 24 hr tablet    TOPROL-XL    60 tablet    TAKE 1 TABLET (100MG) BY MOUTH DAILY       NovoLOG FLEXPEN 100 UNIT/ML injection   Generic drug:  insulin aspart     15 mL    INJECT 20 UNITS SUBCUTANEOUSLY THREE TIMES A DAY (WITH MEALS), INJECT AN EXTRA 7 UNITS ONCE DAILY FOR BLOOD SUGAR OVER 500       ONE TOUCH ULTRA test strip   Generic drug:  blood glucose monitoring     150 strip    TEST TWICE DAILY AS DIRECTED.       * order for DME     1 Device    Equipment being ordered: Rolling walker       * order for DME     1 each    Hold Novolog if meals are refused.       polyethylene glycol powder    MIRALAX/GLYCOLAX    527 g    TAKE 17 GRAMS (1 CAPFUL) BY MOUTH DAILY       prednisoLONE acetate 1 % ophthalmic susp    PRED FORTE    2 Bottle    1 drop in surgical eye as directed, 4x daily after surgery for 1 week, 3x daily for 1 week, 2x daily for 1 week, daily for 1 week, then stop       ranitidine 300 MG tablet    ZANTAC    30 tablet    Take 1 tablet (300 mg) by mouth At Bedtime       TRULICITY 1.5 MG/0.5ML pen   Generic drug:  dulaglutide     2 mL    INJECT 1.5MG SUBCUTANEOUSLY EVERY SEVEN DAYS       ULTICARE MINI 31G X 6 MM   Generic drug:  insulin pen needle     100 each    USE 4 DAILY OR AS DIRECTED       vitamin D3 61712 UNITS capsule    CHOLECALCIFEROL    4 capsule    TAKE 1 CAPSULE (50,000 UNITS) BY MOUTH ONCE A WEEK       * Notice:  This list has 2 medication(s) that are the same as other  medications prescribed for you. Read the directions carefully, and ask your doctor or other care provider to review them with you.

## 2017-06-08 NOTE — TELEPHONE ENCOUNTER
Incoming from pt's group home stating pt was advised by Usman to stop taking Aspirin for 10 days prior to surgery. Group home staff would like an order. Please fax to: [phone] Attn: Alva Valenzuela

## 2017-06-08 NOTE — PATIENT INSTRUCTIONS
Before Your Surgery      Call your surgeon if there is any change in your health. This includes signs of a cold or flu (such as a sore throat, runny nose, cough, rash or fever).    Do not smoke, drink alcohol or take over the counter medicine (unless your surgeon or primary care doctor tells you to) for the 24 hours before and after surgery.    If you take prescribed drugs: Follow your doctor s orders about which medicines to take and which to stop until after surgery.  Stop Aspirin 7 days prior to the procedure.     Eating and drinking prior to surgery: follow the instructions from your surgeon    Take a shower or bath the night before surgery. Use the soap your surgeon gave you to gently clean your skin. If you do not have soap from your surgeon, use your regular soap. Do not shave or scrub the surgery site.  Wear clean pajamas and have clean sheets on your bed.

## 2017-06-12 ENCOUNTER — OFFICE VISIT (OUTPATIENT)
Dept: FAMILY MEDICINE | Facility: CLINIC | Age: 57
End: 2017-06-12
Payer: COMMERCIAL

## 2017-06-12 DIAGNOSIS — R30.0 DYSURIA: Primary | ICD-10-CM

## 2017-06-12 DIAGNOSIS — E11.3299 TYPE 2 DIABETES MELLITUS WITH MILD NONPROLIFERATIVE RETINOPATHY WITHOUT MACULAR EDEMA, WITH LONG-TERM CURRENT USE OF INSULIN, UNSPECIFIED LATERALITY (H): ICD-10-CM

## 2017-06-12 DIAGNOSIS — F25.1 SCHIZOAFFECTIVE DISORDER, DEPRESSIVE TYPE (H): ICD-10-CM

## 2017-06-12 DIAGNOSIS — K21.00 GASTROESOPHAGEAL REFLUX DISEASE WITH ESOPHAGITIS: ICD-10-CM

## 2017-06-12 DIAGNOSIS — G47.33 OSA (OBSTRUCTIVE SLEEP APNEA): ICD-10-CM

## 2017-06-12 DIAGNOSIS — Z79.4 TYPE 2 DIABETES MELLITUS WITH MILD NONPROLIFERATIVE RETINOPATHY WITHOUT MACULAR EDEMA, WITH LONG-TERM CURRENT USE OF INSULIN, UNSPECIFIED LATERALITY (H): ICD-10-CM

## 2017-06-12 LAB
ALBUMIN UR-MCNC: NEGATIVE MG/DL
APPEARANCE UR: CLEAR
BILIRUB UR QL STRIP: NEGATIVE
COLOR UR AUTO: YELLOW
GLUCOSE UR STRIP-MCNC: 100 MG/DL
HGB UR QL STRIP: NEGATIVE
KETONES UR STRIP-MCNC: NEGATIVE MG/DL
LEUKOCYTE ESTERASE UR QL STRIP: NEGATIVE
NITRATE UR QL: NEGATIVE
PH UR STRIP: 5.5 PH (ref 5–7)
SP GR UR STRIP: 1.02 (ref 1–1.03)
URN SPEC COLLECT METH UR: ABNORMAL
UROBILINOGEN UR STRIP-ACNC: 0.2 EU/DL (ref 0.2–1)

## 2017-06-12 PROCEDURE — 81003 URINALYSIS AUTO W/O SCOPE: CPT | Performed by: NURSE PRACTITIONER

## 2017-06-12 PROCEDURE — 99215 OFFICE O/P EST HI 40 MIN: CPT | Performed by: NURSE PRACTITIONER

## 2017-06-12 NOTE — MR AVS SNAPSHOT
After Visit Summary   6/12/2017    Nena Tang    MRN: [id number]           Patient Information     Date Of Birth          1960        Visit Information        Provider Department      6/12/2017 3:30 PM Antionette Campo APRN CNP [facility]        Today's Diagnoses     Dysuria    -  1    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, with long-term current use of insulin, unspecified laterality (H)        Gastroesophageal reflux disease with esophagitis        CINDY (obstructive sleep apnea)- mild AHI 10.3        Schizoaffective disorder, depressive type (H)           Follow-ups after your visit        Follow-up notes from your care team     Return in about 2 weeks (around 6/26/2017) for Mental Health assessment, Lab Work, Diabetes, Obesity, medication management.      Your next 10 appointments already scheduled     Jun 28, 2017 10:30 AM CDT   DX HIP/PELVIS/SPINE with RIDX1   [facility]    [address]  [address]  [facility] [address]              Please do not take any of the following 24 hours prior to the day of your exam: vitamins, calcium tablets, antacids.            Jun 30, 2017   Procedure with Tyra Diaz MD   [facility] (--)    [address]  [facility] [address]   [phone]            Jul 03, 2017  2:30 PM CDT   Return Visit with ART Wilburn CNP   [facility] ([facility])    [address]  [address]  [facility] [address]   [phone]            Jul 03, 2017  2:30 PM CDT   Return Visit with Richardson Lopez [facility] ([facility])    [address]  [address]  [facility] [address]   [phone]            Jul 06, 2017  8:30 AM CDT   Return Sleep  Patient with William Eng MD   [facility] ([facility])    [address] [place] [address]  [facility] [address] [phone]            Aug 02, 2017  2:00 PM CDT   Post-Op with Tyra Diaz MD   Eye Clinic ([facility])    [address]  [address]  [address]  [facility] [id number] [phone]            Nov 15, 2017 10:15 AM CST   RETURN RETINA with Tiburcio Chacon MD   Eye Clinic ([facility])    [address]  [address]  [address]  [facility] [id number] [phone]              Who to contact     If you have questions or need follow up information about today's clinic visit or your schedule please contact [facility] directly at [phone].  Normal or non-critical lab and imaging results will be communicated to you by Dynasilhart, letter or phone within 4 business days after the clinic has received the results. If you do not hear from us within 7 days, please contact the clinic through Dynasilhart or phone. If you have a critical or abnormal lab result, we will notify you by phone as soon as possible.  Submit refill requests through Topple Track or call your pharmacy and they will forward the refill request to us. Please allow 3 business days for your refill to be completed.          Additional Information About Your Visit        Dynasilhart Information     Topple Track gives you secure access to your electronic health record. If you see a primary care provider, you can also send messages to your care team and make appointments. If you have questions, please call your primary care clinic.  If you do not have a primary care provider, please call [phone] and they will assist you.        Care EveryWhere ID     This is your Care EveryWhere ID. This could be used by other organizations to access your [place] medical records  [id number]        Your Vitals Were      Pulse Temperature Respirations Last Period Pulse Oximetry BMI (Body Mass Index)    85 98.4  F (36.9  C) (Oral) 16 01/06/2015 97% 43.94 kg/m2       Blood Pressure from Last 3 Encounters:   06/13/17 118/74   06/08/17 110/68   06/02/17 138/82    Weight from Last 3 Encounters:   06/13/17 225 lb (102.1 kg)   06/08/17 225 lb 8 oz (102.3 kg)   06/02/17 226 lb (102.5 kg)              We Performed the Following     *UA reflex to Microscopic and Culture ([place] [facility] (except [place] and [place])        Primary Care Provider    None Specified       No primary provider on file.        Goals        General    Medical (pt-stated)     Notes - Note created  7/7/2016  9:15 AM by Chelsea Pulido, RN    Get blood sugars under control    Improve medication compliance    As of today's date 7/7/2016 goal is met at 0 - 25%.   Goal Status:  Active          Equal Access to Services     [facility]: Hadii samira shane hadasho Sodelphine, waaxda luqadaha, qaybta kaalmada adeegyada, lorena ellison . So [facility] [phone].    ATENCIÓN: Si habla español, tiene a trinh disposición servicios gratuitos de asistencia lingüística. Llame al [phone].    We comply with applicable federal civil rights laws and [place] laws. We do not discriminate on the basis of race, color, national origin, age, disability sex, sexual orientation or gender identity.            Thank you!     Thank you for choosing [facility]  for your care. Our goal is always to provide you with excellent care. Hearing back from our patients is one way we can continue to improve our services. Please take a few minutes to complete the written survey that you may receive in the mail after your visit with us. Thank you!             Your Updated Medication List - Protect others around you: Learn how to safely use, store and throw away your medicines at www.disposemymeds.org.          This list is accurate as of:  6/12/17 11:59 PM.  Always use your most recent med list.                   Brand Name Dispense Instructions for use Diagnosis    acetaminophen 500 MG tablet    TYLENOL    100 tablet    Take 2 tablets (1,000 mg) by mouth every 6 hours as needed for pain    Closed fracture of one rib of right side, initial encounter       aspirin 81 MG EC tablet    ASPIRIN LOW DOSE    100 tablet    Take 1 tablet (81 mg) by mouth daily    Coronary artery disease involving native heart with angina pectoris, unspecified vessel or lesion type (H)       atorvastatin 80 MG tablet    LIPITOR    60 tablet    TAKE 1 TABLET (80MG) BY MOUTH DAILY    Hyperlipidemia LDL goal <100       benztropine 1 MG tablet    COGENTIN    60 tablet    Take 1 tablet (1 mg) by mouth 2 times daily    Tardive dyskinesia       blood glucose monitoring lancets     1 Box    Use to test blood sugar 2 times daily or as directed.  Ok to substitute alternative if insurance prefers.    Type 2 diabetes mellitus with diabetic neuropathy, with long-term current use of insulin (H)       citalopram 20 MG tablet    celeXA    30 tablet    Take 1 tablet (20 mg) by mouth daily    Schizoaffective disorder, depressive type (H)       DOK PLUS 8.6-50 MG per tablet   Generic drug:  senna-docusate     100 tablet    TAKE 1 TABLET BY MOUTH TWICE A DAY AS NEEDED FOR CONSTIPATION    Constipation, unspecified constipation type       gabapentin 300 MG capsule    NEURONTIN    168 capsule    TAKE 2 CAPSULES (600MG) BY MOUTH THREE TIMES A DAY    Type 2 diabetes mellitus with diabetic neuropathy, with long-term current use of insulin (H)       glucose 4 G Chew chewable tablet     20 tablet    Take 2 every 15 minutes for blood sugar <70mg/dL. Recheck blood sugar every 15 minutes until above 70mg/dL, then eat a substantial meal.    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, with long-term current use of insulin, unspecified laterality (H)       haloperidol  5 MG tablet    HALDOL    30 tablet    Take 1 tablet (5 mg) by mouth At Bedtime    Schizoaffective disorder, depressive type (H)       hydrocortisone 1 % ointment     30 g    Apply sparingly to affected area three times daily for 14 days.    Bug bites, initial encounter       ibuprofen 600 MG tablet    ADVIL/MOTRIN    60 tablet    Take 1 tablet (600 mg) by mouth every 4 hours as needed for moderate pain    Closed fracture of one rib of right side, initial encounter       LANTUS SOLOSTAR 100 UNIT/ML injection   Generic drug:  insulin glargine     15 mL    INJECT 45 UNITS SUBCUTANEOUSLY EVERY NIGHT AT BEDTIME    Type 2 diabetes mellitus with both eyes affected by mild nonproliferative retinopathy without macular edema, with long-term current use of insulin (H)       levofloxacin 0.5 % ophthalmic solution    QUIXIN    2 Bottle    1 drop in surgical eye as directed - 4x daily for 1 week, then stop    Cataract, left       losartan 100 MG tablet    COZAAR    90 tablet    TAKE 1 TABLET (100MG) BY MOUTH DAILY    Coronary artery disease involving native heart with angina pectoris, unspecified vessel or lesion type (H)       melatonin 5 MG Caps     90 capsule    Take 1 capsule by mouth daily At dinnertime    Insomnia, unspecified type       metoprolol 100 MG 24 hr tablet    TOPROL-XL    60 tablet    TAKE 1 TABLET (100MG) BY MOUTH DAILY    Coronary artery disease involving native heart with angina pectoris, unspecified vessel or lesion type (H)       NovoLOG FLEXPEN 100 UNIT/ML injection   Generic drug:  insulin aspart     15 mL    INJECT 20 UNITS SUBCUTANEOUSLY THREE TIMES A DAY (WITH MEALS), INJECT AN EXTRA 7 UNITS ONCE DAILY FOR BLOOD SUGAR OVER 500    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     Type 2 diabetes mellitus with diabetic neuropathy, with long-term current use of insulin (H)       * order for DME     1 Device    Equipment being ordered: Rolling walker    Falls frequently       * order for DME     1 each    Hold Novolog if meals are refused.    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, with long-term current use of insulin, unspecified laterality (H)       polyethylene glycol powder    MIRALAX/GLYCOLAX    527 g    TAKE 17 GRAMS (1 CAPFUL) BY MOUTH DAILY    Constipation, unspecified constipation type       prednisoLONE acetate 1 % ophthalmic susp    PRED FORTE    2 Bottle    1 drop in surgical eye as directed, 4x daily after surgery for 1 week, 3x daily for 1 week, 2x daily for 1 week, daily for 1 week, then stop    Cataract, left       ranitidine 300 MG tablet    ZANTAC    30 tablet    Take 1 tablet (300 mg) by mouth At Bedtime    Gastroesophageal reflux disease without esophagitis       TRULICITY 1.5 MG/0.5ML pen   Generic drug:  dulaglutide     2 mL    INJECT 1.5MG SUBCUTANEOUSLY EVERY SEVEN DAYS    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, with long-term current use of insulin, unspecified laterality (H)       ULTICARE MINI 31G X 6 MM   Generic drug:  insulin pen needle     100 each    USE 4 DAILY OR AS DIRECTED    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, with long-term current use of insulin, unspecified laterality (H)       vitamin D3 83211 UNITS capsule    CHOLECALCIFEROL    4 capsule    TAKE 1 CAPSULE (50,000 UNITS) BY MOUTH ONCE A WEEK    Vitamin D deficiency       * Notice:  This list has 2 medication(s) that are the same as other medications prescribed for you. Read the directions carefully, and ask your doctor or other care provider to review them with you.

## 2017-06-12 NOTE — PROGRESS NOTES
SUBJECTIVE:                                                    Nena Tang is a 57 year old female who presents to clinic today for the following health issues:  [facility]: Richardson Lopez    Urinary Tract Symptoms: She believes she has a UTI  Nena reports that her pee has been dark lately, and her back has been hurting. She reports experiencing dysuria for about one week, but she reported the sx to her home four days ago.    Gastritis  Nena has been experiencing sx of gastritis for a few months. She experiences uncomfortable belly pain. She was taking omeprazole until last Saturday, but her sx didn't change with the medicine change.    Mental Health Follow up: Depression    Status since last visit: improved    See PHQ-9 for current symptoms.  Other associated symptoms: None    Complicating factors: multiple chronic illness  Significant life event: Stress with family members   Current substance abuse: denies  Anxiety or Panic symptoms:  No    Sleep - Stable  Appetite - Normal  Exercise - None    Alcohol - None    PHQ-9  English PHQ-9   Any Language           Diabetes Follow-up  Lab Results   Component Value Date    A1C 7.2 03/28/2017    A1C 7.1 01/27/2017    A1C 9.7 11/11/2016    A1C 9.3 08/11/2016    A1C 9.8 05/23/2016     Patient is checking blood sugars daily.    Results are as follows:  Usually in the 200s  350-500s after visiting family and drinking soda    Diabetic concerns: None and blood sugar frequently over 200     Symptoms of hypoglycemia (low blood sugar): none     Paresthesias (numbness or burning in feet) or sores: No           Amount of exercise or physical activity: None    Problems taking medications regularly: No    Medication side effects: none    Diet: regular (no restrictions)    Social History   Substance Use Topics     Smoking status: Never Smoker     Smokeless tobacco: Never Used     Alcohol use No      Comment: last month        Problem list and histories reviewed & adjusted, as  indicated.  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)     Illiterate     Irritable bowel syndrome     overweight - BMI >35     Takotsubo cardiomyopathy     CAD (coronary artery disease)     Restless legs syndrome (RLS)     CINDY (obstructive sleep apnea)- mild AHI 10.3     Verbal auditory hallucination     Chronic low back pain     Schizoaffective disorder, depressive type (H)     Migraine headache     HTN, goal below 140/90     Health Care Home     Lumbago     Cervicalgia     Cocaine abuse, episodic     Suicidal ideation     Esophageal reflux     Mild nonproliferative diabetic retinopathy (H)     Tardive dyskinesia     Alcohol use     Left cataract     Falls frequently     History of uterine cancer     Schizoaffective disorder (H)     Depression     Psychophysiological insomnia     Dysuria     Past Surgical History:   Procedure Laterality Date     C OOPHORECTORMY FOR RAHEL, W/BX  1983    UTERINE     COLONOSCOPY N/A 3/16/2017    Procedure: COLONOSCOPY;  Surgeon: Traci Gonzalez MD;  Location:  GI     Coronary CTA  5/21/2014     HYSTERECTOMY  1983    uterine cancer yearly pap's per provider.     LAPAROSCOPIC CHOLECYSTECTOMY  2008     PHACOEMULSIFICATION CLEAR CORNEA WITH STANDARD INTRAOCULAR LENS IMPLANT Left 5/5/2017    Procedure: PHACOEMULSIFICATION CLEAR CORNEA WITH STANDARD INTRAOCULAR LENS IMPLANT;  LEFT EYE PHACOEMULSIFICATION CLEAR CORNEA WITH STANDARD INTRAOCULAR LENS IMPLANT ;  Surgeon: Tyra Diaz MD;  Location:  [facility]     RELEASE TRIGGER FINGER  10/11/2012    Left thumb. Procedure: RELEASE TRIGGER FINGER;  LEFT THUMB TRIGGER RELEASE;  Surgeon: Tay Langley MD;  Location:  [facility]     RELEASE TRIGGER FINGER Right 12/26/2016    Procedure:  RELEASE TRIGGER FINGER;  Surgeon: Albino Castañeda MD;  Location: [facility] OR       Social History   Substance Use Topics     Smoking status: Never Smoker     Smokeless tobacco: Never Used     Alcohol use No      Comment: last month     Family History   Problem Relation Age of Onset     CANCER Mother      BLADDER     Respiratory Mother      COPD     GASTROINTESTINAL DISEASE Mother      CIRRHOSIS OF LI BOLIVAR     Alcohol/Drug Mother      DIABETES Mother      Hypertension Mother      Lipids Mother      C.A.D. Mother      Glaucoma Mother      Alcohol/Drug Sister      MENTAL ILLNESS Sister      Alcohol/Drug Sister      Psychotic Disorder Sister      CANCER Maternal Grandmother      UNKNOWN TYPE     CANCER Brother      COLON     Cancer - colorectal Brother      IN HIS LATE 30S     Alcohol/Drug Brother       OF HEROIN OVERDOSE AT AGE 22 YRS     Macular Degeneration No family hx of          Past medication trials: (patient was presented with a list to review all currently available antidepressants, mood stabilizers, tranquilizers, hypnotics and antipsychotics)    Older Antidepressants: None  New Antidepressants:  Celexa (citalopram), Effexor (venlafaxine), Wellbutrin, Zyban, Aplenzin (bupropion) and Zoloft (sertraline)  Mood Stabilizers:  None  Cognition-Enhancing Meds:  None  Stimulants / ADHD Meds: None  Older Antipsychotics:  Haldol (haloperidol)  Newer Antipsychotics: Abilify (aripriprazole)  Sedatives/Hypnotics:  Ambien (zolpidem) and Desyrel (trazadone)  Tranquilizers:  Ativan (lorazepam), Buspar (buspirone) and Xanax (alprazolam)     Current Outpatient Prescriptions   Medication Sig Dispense Refill     ranitidine (ZANTAC) 300 MG tablet Take 1 tablet (300 mg) by mouth At Bedtime 30 tablet 1     hydrocortisone 1 % ointment Apply sparingly to affected area three times daily for 14 days. 30 g 0     vitamin D3 (CHOLECALCIFEROL) 40129 UNITS capsule TAKE 1 CAPSULE (50,000 UNITS) BY MOUTH ONCE A WEEK 4 capsule 3     DOK  PLUS 50-8.6 MG per tablet TAKE 1 TABLET BY MOUTH TWICE A DAY AS NEEDED FOR CONSTIPATION 100 tablet 3     losartan (COZAAR) 100 MG tablet TAKE 1 TABLET (100MG) BY MOUTH DAILY 90 tablet 1     atorvastatin (LIPITOR) 80 MG tablet TAKE 1 TABLET (80MG) BY MOUTH DAILY 60 tablet 1     metoprolol (TOPROL-XL) 100 MG 24 hr tablet TAKE 1 TABLET (100MG) BY MOUTH DAILY 60 tablet 1     gabapentin (NEURONTIN) 300 MG capsule TAKE 2 CAPSULES (600MG) BY MOUTH THREE TIMES A  capsule 3     TRULICITY 1.5 MG/0.5ML pen INJECT 1.5MG SUBCUTANEOUSLY EVERY SEVEN DAYS 2 mL 11     levofloxacin (QUIXIN) 0.5 % ophthalmic solution 1 drop in surgical eye as directed - 4x daily for 1 week, then stop 2 Bottle 1     prednisoLONE acetate (PRED FORTE) 1 % ophthalmic susp 1 drop in surgical eye as directed, 4x daily after surgery for 1 week, 3x daily for 1 week, 2x daily for 1 week, daily for 1 week, then stop 2 Bottle 1     ONE TOUCH ULTRA test strip TEST TWICE DAILY AS DIRECTED. 150 strip 11     polyethylene glycol (MIRALAX/GLYCOLAX) powder TAKE 17 GRAMS (1 CAPFUL) BY MOUTH DAILY 527 g 3     NOVOLOG FLEXPEN 100 UNIT/ML soln INJECT 20 UNITS SUBCUTANEOUSLY THREE TIMES A DAY (WITH MEALS), INJECT AN EXTRA 7 UNITS ONCE DAILY FOR BLOOD SUGAR OVER 500 15 mL 3     ULTICARE MINI 31G X 6 MM insulin pen needle USE 4 DAILY OR AS DIRECTED 100 each 11     LANTUS SOLOSTAR 100 UNIT/ML soln INJECT 45 UNITS SUBCUTANEOUSLY EVERY NIGHT AT BEDTIME 15 mL 3     aspirin (ASPIRIN LOW DOSE) 81 MG EC tablet Take 1 tablet (81 mg) by mouth daily 100 tablet 4     acetaminophen (TYLENOL) 500 MG tablet Take 2 tablets (1,000 mg) by mouth every 6 hours as needed for pain 100 tablet 0     ibuprofen (ADVIL,MOTRIN) 600 MG tablet Take 1 tablet (600 mg) by mouth every 4 hours as needed for moderate pain 60 tablet 0     melatonin 5 MG CAPS Take 1 capsule by mouth daily At dinnertime 90 capsule 1     glucose 4 G CHEW Take 2 every 15 minutes for blood sugar <70mg/dL. Recheck blood sugar  every 15 minutes until above 70mg/dL, then eat a substantial meal. 20 tablet 1     order for DME Hold Novolog if meals are refused. 1 each 0     blood glucose monitoring (ONE TOUCH DELICA) lancets Use to test blood sugar 2 times daily or as directed.  Ok to substitute alternative if insurance prefers. 1 Box prn     citalopram (CELEXA) 20 MG tablet Take 1 tablet (20 mg) by mouth daily 30 tablet 2     haloperidol (HALDOL) 5 MG tablet Take 1 tablet (5 mg) by mouth At Bedtime 30 tablet 2     benztropine (COGENTIN) 1 MG tablet Take 1 tablet (1 mg) by mouth 2 times daily 60 tablet 3     order for DME Equipment being ordered: Rolling walker 1 Device 0     Haldol (haloperidol lactate)  High Risk Drug Monitoring? Yes  Name of Drug being monitored: Haldol  Reason for drug, and what is being monitored and why: schizophrenic signs. CBC, EKG.  Follow-up Plan: continue current plan with regular assessment.    Gabapentin (Neurontin)  High Risk Drug Monitoring? Yes  Name of Drug being monitored: Gabapentin  Reason for drug, and what is being monitored and why: mood stabilization.      Follow-up Plan: Monitor on-going and adjust dose as appropriate.     Allergies   Allergen Reactions     Imidazole Antifungals Hives     Tolerates diflucan     Ketoprofen Itching     Pruritis to topical     Lisinopril Hives     Metformin Other (See Comments)     Patient hospitalized for lactic acidosis - admitting provider suspectd caused by metformin     Metronidazole Hives     Posaconazole Hives     Tolerates diflucan     Recent Labs   Lab Test  03/28/17   1114  01/27/17   1211  01/24/17   1957  01/22/17   1936  12/29/16   0909   11/11/16   2234  10/25/16   0815   07/13/16   1350   07/14/15   1215   06/02/14   1132   09/03/13   1156   01/22/13   1134   A1C  7.2*  7.1*   --    --    --    --   9.7*   --    < >   --    < >  9.1*   < >  8.3*   < >  10.8*   < >  12.9*   LDL   --    --    --    --    --    --    --    --    --   137*   --   78   --   71    --   90   --   92   HDL   --    --    --    --    --    --    --    --    --    --    --   39*   --    --    --   37*   --   45*   TRIG   --    --    --    --    --    --    --    --    --    --    --   151*   --    --    --   171*   --   220*   ALT   --    --   24  25  26   --   18  15   < >   --    < >   --    < >   --    < >  38   < >  32   CR   --    --   0.94  0.68  0.72   < >  0.96  0.62   < >   --    < >  0.67   < >   --    < >  0.54   < >  0.52   GFRESTIMATED   --    --   61  90  83   < >  60*  >90  Non  GFR Calc     < >   --    < >  >90  Non  GFR Calc     < >   --    < >  >90   < >  >90   GFRESTBLACK   --    --   74  >90   GFR Calc    >90   GFR Calc     < >  73  >90   GFR Calc     < >   --    < >  >90   GFR Calc     < >   --    < >  >90   < >  >90   POTASSIUM   --    --   4.6  4.1  4.2   < >  4.5  3.8   < >   --    < >  4.3   < >   --    < >  4.4   < >  4.0   TSH   --    --    --    --   2.24   --    --   0.93   --    --    < >   --    < >   --    < >  1.42   --    --     < > = values in this interval not displayed.      BP Readings from Last 3 Encounters:   06/13/17 118/74   06/08/17 110/68   06/02/17 138/82    Wt Readings from Last 3 Encounters:   06/13/17 225 lb (102.1 kg)   06/08/17 225 lb 8 oz (102.3 kg)   06/02/17 226 lb (102.5 kg)        Labs reviewed in EPIC  Problem list, Medication list, Allergies, and Medical/Social/Surgical histories reviewed in [facility] and updated as appropriate.     ROS: 10 point ROS neg other than the symptoms noted above in the HPI.    This document serves as a record of the services and decisions personally performed and made by ART Castillo NP. It was created on her behalf by Belem Mjeia, a trained medical scribe. The creation of this document is based on the provider's statements to the medical scribe.  Belem Mejia June 12, 2017 4:37 PM     OBJECTIVE:                                                     /74  Pulse 85  Temp 98.4  F (36.9  C) (Oral)  Resp 16  Wt 225 lb (102.1 kg)  LMP 01/06/2015  SpO2 97%  BMI 43.94 kg/m2  Body mass index is 43.94 kg/(m^2).  GENERAL: chronically ill appearing, alert, well nourished, well hydrated, no acute  distress  EYES: Changes related to cataract extraction.  Eyes grossly normal to inspection, extraocular movements - intact, and PERRL  NECK: no tenderness, no adenopathy, no asymmetry, no masses, no stiffness; thyroid- normal to palpation, reduced range of motion, limited by discomfort.   RESP: lungs clear to auscultation - no rales, no rhonchi, no wheezes  CV: regular rates and rhythm, normal S1 S2, no S3 or S4 and no murmur, no click or rub -  MS: extremities- no gross deformities noted, mild non-pitting edema bilaterally pedal to mid calf  SKIN: no suspicious lesions, no rashes  NEURO: Speech slow and deliberate, strength and tone- normal, sensory exam- grossly normal, mentation- intact, speech- normal, reflexes- symmetric  Non focal no aphasia. No facial asymmetry.   BACK: no CVA tenderness, moderate paralumbar tenderness with firm palpation  MENTAL HEALTH  Appearance:  awake, alert and casually groomed  Attitude:  cooperative  Eye Contact: fair  Mood:  depressed  Affect: flat  Speech:  slow, deliberate, no slurred speech  Psychomotor Behavior: Positive evidence of tardive dyskinesia with tongue lolling and jerking hand movements   Thought Process:  logical goal directed  Associations:  some loose associations  Thought Content: Reports stable visual hallucination of showdow of someone standing next to her.  No evidence of suicidal ideation or homicidal ideation and no evidence of psychotic thought  Insight:  fair  Judgment:  limited  Oriented to:  time, person, and place  Attention Span and Concentration:  limited  Recent and Remote Memory:  intact  Fund of Knowledge: appropriate  Muscle Strength and Tone: normal   See [facility]  notes    Diagnostic Test Results:  No results found. However, due to the size of the patient record, not all encounters were searched. Please check Results Review for a complete set of results.     ASSESSMENT/PLAN:                                                    (R30.0) Dysuria  (primary encounter diagnosis)  Comment: Concern for persistent UTI despite recent treatment  Plan: *UA reflex to Microscopic and Culture ([place]         and [facility] (except [place] and         [place]), CANCELED: UA reflex to Microscopic        UA without concern for infection, rather elevated glucose.  No antibiotics prescribed at this time.     (E11.3299,  Z79.4) 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, with long-term current use of insulin, unspecified laterality (H)  Comment: Slowly regaining control of diabetes.  Allowing staff support  Plan: A1C at next visit.Engage staff in encouraging health diabetic diet, increase exercise, walking, testing BG 2 x daily.     (K21.0) Gastroesophageal reflux disease with esophagitis  Comment:Persistent symptoms of epigastric discomfort.  Concern for H. Pylori infection  Plan: H Pylori antigen, stool       Requesting that she submit a stool sample for evaluation.  Will call with results that require further intervention, otherwise will send a letter.      (G47.33) CINDY (obstructive sleep apnea)- mild AHI 10.3  Comment: Struggling to use C-Pap on regular basis  Plan: Encouraged staff to evaluate whether the CPAP setting are current, to call vendor if there is a concern.     (F25.1) Schizoaffective disorder, depressive type (H)  Comment: Stable but persistent symptoms with evidence of tardive dyskinesia   Plan: Haldol and Cogentin to continue.  Also taking moderate dose of Citalopram  Greater than 40 minutes was spent with this patient, with greater than 50 % in counselling about Sizoaffective disorder, diabetes, Sleep Apnea, Urinary tract  symptoms, Gastric symptoms, and extensive chart review.    Patient Instructions:  There are no Patient Instructions on file for this visit.    The information in this document, created by the medical scribe for me, accurately reflects the services I personally performed and the decisions made by me. I have reviewed and approved this document for accuracy prior to leaving the patient care area.  6/12/2017 4:37 PM     ART [facility]

## 2017-06-13 VITALS
RESPIRATION RATE: 16 BRPM | OXYGEN SATURATION: 97 % | DIASTOLIC BLOOD PRESSURE: 74 MMHG | BODY MASS INDEX: 43.94 KG/M2 | SYSTOLIC BLOOD PRESSURE: 118 MMHG | TEMPERATURE: 98.4 F | HEART RATE: 85 BPM | WEIGHT: 225 LBS

## 2017-06-13 NOTE — NURSING NOTE
Chief Complaint   Patient presents with     RECHECK       Initial /74  Pulse 85  Temp 98.4  F (36.9  C) (Oral)  Resp 16  Wt 225 lb (102.1 kg)  LMP 01/06/2015  SpO2 97%  BMI 43.94 kg/m2 Estimated body mass index is 43.94 kg/(m^2) as calculated from the following:    Height as of 5/18/17: 5' (1.524 m).    Weight as of this encounter: 225 lb (102.1 kg).  Medication Reconciliation: complete     Adam Escamilla,CMA

## 2017-06-20 ENCOUNTER — TRANSFERRED RECORDS (OUTPATIENT)
Dept: HEALTH INFORMATION MANAGEMENT | Facility: CLINIC | Age: 57
End: 2017-06-20

## 2017-06-20 DIAGNOSIS — Z79.4 TYPE 2 DIABETES MELLITUS WITH DIABETIC NEUROPATHY, WITH LONG-TERM CURRENT USE OF INSULIN (H): ICD-10-CM

## 2017-06-20 DIAGNOSIS — E11.40 TYPE 2 DIABETES MELLITUS WITH DIABETIC NEUROPATHY, WITH LONG-TERM CURRENT USE OF INSULIN (H): ICD-10-CM

## 2017-06-22 ENCOUNTER — TRANSFERRED RECORDS (OUTPATIENT)
Dept: HEALTH INFORMATION MANAGEMENT | Facility: CLINIC | Age: 57
End: 2017-06-22

## 2017-06-22 LAB — PHQ9 SCORE: 24

## 2017-06-23 ENCOUNTER — NURSE TRIAGE (OUTPATIENT)
Dept: NURSING | Facility: CLINIC | Age: 57
End: 2017-06-23

## 2017-06-23 ENCOUNTER — CARE COORDINATION (OUTPATIENT)
Dept: FAMILY MEDICINE | Facility: CLINIC | Age: 57
End: 2017-06-23

## 2017-06-23 NOTE — PROGRESS NOTES
We could benefit from requesting the Psychiatry records from Nena's psychiatrist.   No current records on file in [facility].     Psychiatrist:  [facility]  [address] [place] [id number]   Phone: [phone]    Will forward this message on to Provider scheduled to see her next.   Antionette Campo CNP, APNP  [facility]

## 2017-06-24 NOTE — TELEPHONE ENCOUNTER
Call from group home staff reporting that patient was inadvertent given 15 mg of haldol this evening instead of usual 10 mg.  Patient is asymptomatic at this time;  Is going to be going home for the weekend with family tonight.  Call placed to on call for  [facility] Ninoska Nguyen NP who advised no further interventions. Group home caregiver contacted and advisement relayed.       Reason for Disposition    [1] DOUBLE DOSE (an extra dose or lesser amount) of prescription drug AND [2] any symptoms (e.g., dizziness, nausea, pain, sleepiness)    Protocols used: MEDICATION QUESTION CALL-ADULT-    Zulema Reyes RN  FNA

## 2017-06-27 ENCOUNTER — OFFICE VISIT (OUTPATIENT)
Dept: BEHAVIORAL HEALTH | Facility: CLINIC | Age: 57
End: 2017-06-27
Payer: MEDICARE

## 2017-06-27 ENCOUNTER — OFFICE VISIT (OUTPATIENT)
Dept: FAMILY MEDICINE | Facility: CLINIC | Age: 57
End: 2017-06-27
Payer: MEDICARE

## 2017-06-27 VITALS
TEMPERATURE: 98.4 F | BODY MASS INDEX: 43.94 KG/M2 | RESPIRATION RATE: 14 BRPM | SYSTOLIC BLOOD PRESSURE: 102 MMHG | HEART RATE: 68 BPM | WEIGHT: 225 LBS | OXYGEN SATURATION: 100 % | DIASTOLIC BLOOD PRESSURE: 60 MMHG

## 2017-06-27 DIAGNOSIS — E11.3293 TYPE 2 DIABETES MELLITUS WITH BOTH EYES AFFECTED BY MILD NONPROLIFERATIVE RETINOPATHY WITHOUT MACULAR EDEMA, WITH LONG-TERM CURRENT USE OF INSULIN (H): ICD-10-CM

## 2017-06-27 DIAGNOSIS — Z00.00 MEDICARE ANNUAL WELLNESS VISIT, SUBSEQUENT: ICD-10-CM

## 2017-06-27 DIAGNOSIS — F32.1 MODERATE MAJOR DEPRESSION (H): ICD-10-CM

## 2017-06-27 DIAGNOSIS — F25.1 SCHIZOAFFECTIVE DISORDER, DEPRESSIVE TYPE (H): Primary | ICD-10-CM

## 2017-06-27 DIAGNOSIS — K21.9 GASTROESOPHAGEAL REFLUX DISEASE WITHOUT ESOPHAGITIS: ICD-10-CM

## 2017-06-27 DIAGNOSIS — E66.01 MORBID OBESITY DUE TO EXCESS CALORIES (H): ICD-10-CM

## 2017-06-27 DIAGNOSIS — E11.3299 TYPE 2 DIABETES MELLITUS WITH MILD NONPROLIFERATIVE RETINOPATHY WITHOUT MACULAR EDEMA, WITH LONG-TERM CURRENT USE OF INSULIN, UNSPECIFIED LATERALITY (H): Primary | ICD-10-CM

## 2017-06-27 DIAGNOSIS — G47.33 OSA (OBSTRUCTIVE SLEEP APNEA): ICD-10-CM

## 2017-06-27 DIAGNOSIS — Z79.4 TYPE 2 DIABETES MELLITUS WITH MILD NONPROLIFERATIVE RETINOPATHY WITHOUT MACULAR EDEMA, WITH LONG-TERM CURRENT USE OF INSULIN, UNSPECIFIED LATERALITY (H): Primary | ICD-10-CM

## 2017-06-27 DIAGNOSIS — F25.1 SCHIZOAFFECTIVE DISORDER, DEPRESSIVE TYPE (H): ICD-10-CM

## 2017-06-27 DIAGNOSIS — M54.50 ACUTE RIGHT-SIDED LOW BACK PAIN WITHOUT SCIATICA: ICD-10-CM

## 2017-06-27 DIAGNOSIS — Z79.4 TYPE 2 DIABETES MELLITUS WITH BOTH EYES AFFECTED BY MILD NONPROLIFERATIVE RETINOPATHY WITHOUT MACULAR EDEMA, WITH LONG-TERM CURRENT USE OF INSULIN (H): ICD-10-CM

## 2017-06-27 LAB — HBA1C MFR BLD: 7 % (ref 4.3–6)

## 2017-06-27 PROCEDURE — 36415 COLL VENOUS BLD VENIPUNCTURE: CPT | Performed by: NURSE PRACTITIONER

## 2017-06-27 PROCEDURE — 80053 COMPREHEN METABOLIC PANEL: CPT | Performed by: NURSE PRACTITIONER

## 2017-06-27 PROCEDURE — 80061 LIPID PANEL: CPT | Performed by: NURSE PRACTITIONER

## 2017-06-27 PROCEDURE — 99214 OFFICE O/P EST MOD 30 MIN: CPT | Performed by: NURSE PRACTITIONER

## 2017-06-27 PROCEDURE — 83036 HEMOGLOBIN GLYCOSYLATED A1C: CPT | Performed by: NURSE PRACTITIONER

## 2017-06-27 PROCEDURE — 90832 PSYTX W PT 30 MINUTES: CPT | Performed by: SOCIAL WORKER

## 2017-06-27 PROCEDURE — 82043 UR ALBUMIN QUANTITATIVE: CPT | Performed by: NURSE PRACTITIONER

## 2017-06-27 RX ORDER — INSULIN GLARGINE 100 [IU]/ML
INJECTION, SOLUTION SUBCUTANEOUS
Qty: 15 ML | Refills: 3 | Status: ON HOLD | OUTPATIENT
Start: 2017-06-27 | End: 2017-08-29

## 2017-06-27 NOTE — PROGRESS NOTES
"[facility] Integrated Primary [facility]   June 27, 2017      Behavioral Health Clinician Progress Note    Patient Name: Nena Tang           Service Type: Individual      Service Location:   Face to Face in Clinic     Session Start Time: 1:20pm  Session End Time: 1:44pm      Session Length: 16 - 37      Attendees: Client and caregiver    Visit Activities (Refresh list every visit): [facility] [employer]    Diagnostic Assessment Date: Not completed  Treatment Plan Review Date: Not completed  See Flowsheets for today's PHQ-9 and TAMIA-7 results  Previous PHQ-9:   PHQ-9 SCORE 12/20/2016 1/2/2017 2/3/2017   Total Score - - -   Total Score 7 0 0     Previous TAMIA-7:   TAMIA-7 SCORE 12/20/2016 1/2/2017 2/3/2017   Total Score - - -   Total Score 6 0 0       ALEXANDRA LEVEL:  ALEXANDRA Score (Last Two) 8/30/2011 6/9/2014   ALEXANDRA Raw Score 42 37   Activation Score 66 49.9   ALEXANDRA Level 3 2       DATA  Extended Session (60+ minutes): No  Interactive Complexity: No  Crisis: No    Treatment Objective(s) Addressed in This Session:  Target Behavior(s): disease management/lifestyle changes Mental Health    Depressed Mood: Increase interest, engagement, and pleasure in doing things  Decrease frequency and intensity of feeling down, depressed, hopeless  Improve quantity and quality of night time sleep / decrease daytime naps  Feel less tired and more energy during the day   Improve diet, appetite, mindful eating, and / or meal planning  Anxiety: will experience a reduction in anxiety, will develop more effective coping skills to manage anxiety symptoms, will develop healthy cognitive patterns and beliefs and will increase ability to function adaptively  Functional Impairment: will effectively address problems that interfere with adaptive functioning    Current Stressors / Issues:    The patient reported that \"the man in the room\" has been bothering her-she reported that he has been coming to her room since she started at her current placement-the times " when she stated to not be bothered by him was when she has a working nightlight-she stated that he only comes to the patient when it is dark so as a result to resist the man she leaves a light on in her room when she is sleeping-she stated that he tells her to kill herself-she does not want to die and she does not want to be visited by the man-she reported that she checks her blood sugars about 2 times a day and has had good/stable readings-the patient reported that a current stressor is that her sister is sick-she reported the family is preparing for the sister to pass away due to her medical issues-the patient has been coping with stress with prayer and attending [employer]-she has an outpatient therapist and community psychiatrist-she reported no depression and no current suicidal thoughts.        Progress on Treatment Objective(s) / Homework:  Minimal progress - ACTION (Actively working towards change); Intervened by reinforcing change plan / affirming steps taken    Motivational Interviewing    MI Intervention: Expressed Empathy/Understanding, Supported Autonomy, Collaboration, Evocation, Permission to raise concern or advise, Open-ended questions and Reflections: simple and complex     Change Talk Expressed by the Patient: Desire to change Ability to change Reasons to change Need to change Committment to change Activation Taking steps    Provider Response to Change Talk: E - Evoked more info from patient about behavior change, A - Affirmed patient's thoughts, decisions, or attempts at behavior change, R - Reflected patient's change talk and S - Summarized patient's change talk statements    Also provided psychoeducation about behavioral health condition, symptoms, and treatment options    Care Plan review completed: No    Medication Review:  See medication list    Medication Compliance:  NA    Changes in Health Issues:   Yes: please refer to PCP note on same day and same time    Chemical Use Review:   Substance  Use: Chemical use reviewed, no active concerns identified      Tobacco Use: No current tobacco use.      Assessment: Current Emotional / Mental Status (status of significant symptoms):  Risk status (Self / Other harm or suicidal ideation)  Patient has had a history of suicidal ideation: yes in the past  Patient denies current fears or concerns for personal safety.  Patient reports the following current or recent suicidal ideation or behaviors: reported having suicidal thoughts about 2 weeks ago. .  Patient denies current or recent homicidal ideation or behaviors.  Patient denies current or recent self injurious behavior or ideation.  Patient denies other safety concerns.  A safety and risk management plan has not been developed at this time, however patient was encouraged to call [place] [phone] should there be a change in any of these risk factors.    Appearance:   Appropriate   Eye Contact:   Good   Psychomotor Behavior: Normal   Attitude:   Cooperative   Orientation:   All  Speech   Rate / Production: Normal    Volume:  Normal   Mood:    Normal  Affect:    Appropriate  Bright   Thought Content:  Clear   Thought Form:  Coherent  Logical   Insight:    Fair     Diagnoses:  1. Schizoaffective disorder, depressive type (H)    2. Moderate major depression (H)        Collateral Reports Completed:  Not Applicable    Plan: (Homework, other):  Patient was given information about behavioral services and encouraged to schedule a follow up appointment with the clinic [facility] as needed.  She was also given information about mental health symptoms and treatment options .  CD Recommendations: Maintain Sobriety.      BIN George, [facility] February 3, 2017

## 2017-06-27 NOTE — MR AVS SNAPSHOT
After Visit Summary   6/27/2017    Nena Tang    MRN: [id number]           Patient Information     Date Of Birth          1960        Visit Information        Provider Department      6/27/2017 1:00 PM Richardson Lopez, [facility]        Today's Diagnoses     Schizoaffective disorder, depressive type (H)    -  1    Moderate major depression (H)           Follow-ups after your visit        Your next 10 appointments already scheduled     Jun 28, 2017 10:30 AM CDT   DX HIP/PELVIS/SPINE with RIDX1   [facility] ([facility])    [address]  [address]  [facility] [address]              Please do not take any of the following 24 hours prior to the day of your exam: vitamins, calcium tablets, antacids.            Jun 30, 2017   Procedure with Tyra Diaz MD   [facility] PeriOP Services (--)    [address]  [facility] [address]   [phone]            Jul 03, 2017  2:30 PM CDT   Return Visit with ART Wilburn [facility] [place] [facility] ([facility] [facility])    [address]  [address]  [facility] [address]   [phone]            Jul 03, 2017  2:30 PM CDT   Return Visit with Richardson Lopez, [facility] ([facility] [place] [facility])    [address]  [address]  [facility] [address]   [phone]            Jul 06, 2017  8:30 AM CDT   Return Sleep Patient with William Eng MD   [facility] ([facility])    [address]  [facility] [address]   [phone]            Aug 02, 2017  2:00 PM CDT   Post-Op with Tyra Diaz MD   [facility] Clinic ([facility])    [address] [facility]  [address] [address]  [facility] [address]   [phone]            Nov 15, 2017  10:15 AM CST   RETURN RETINA with Tiburcio Chacon MD   Eye Clinic ([facility])    [address]  [address] [facility]  9th Fl Clin 9a  [facility] [id number] [phone]              Who to contact     If you have questions or need follow up information about today's clinic visit or your schedule please contact [facility] directly at [phone].  Normal or non-critical lab and imaging results will be communicated to you by Moathart, letter or phone within 4 business days after the clinic has received the results. If you do not hear from us within 7 days, please contact the clinic through [employer] or phone. If you have a critical or abnormal lab result, we will notify you by phone as soon as possible.  Submit refill requests through [employer] or call your pharmacy and they will forward the refill request to us. Please allow 3 business days for your refill to be completed.          Additional Information About Your Visit        [employer] Information     [employer] gives you secure access to your electronic health record. If you see a primary care provider, you can also send messages to your care team and make appointments. If you have questions, please call your primary care clinic.  If you do not have a primary care provider, please call [phone] and they will assist you.        Care EveryWhere ID     This is your Care EveryWhere ID. This could be used by other organizations to access your [place] medical records  [id number]        Your Vitals Were     Last Period                   01/06/2015            Blood Pressure from Last 3 Encounters:   06/27/17 102/60   06/13/17 118/74   06/08/17 110/68    Weight from Last 3 Encounters:   06/27/17 225 lb (102.1 kg)   06/13/17 225 lb (102.1 kg)   06/08/17 225 lb 8 oz (102.3 kg)              Today, you had the following     No orders found for display         Where to get your medicines      These medications were  sent to [place], [place] - [address] [place] [address]  [address] [facility] [address]     Phone:  [phone]     ANUMT SOLOSTAR 100 UNIT/ML injection          Primary Care Provider Office Phone # Fax #    ART Wilburn [phone]       [facility] PRIMARY CARE [address] [facility] [address]  [facility] [address]        Goals        General    Medical (pt-stated)     Notes - Note created  7/7/2016  9:15 AM by Chelsea Pulido, RN    Get blood sugars under control    Improve medication compliance    As of today's date 7/7/2016 goal is met at 0 - 25%.   Goal Status:  Active          Equal Access to Services     RAMESH GARRIDO : Hadii samira shane hadasho Soomaali, waaxda luqadaha, qaybta kaalmada adeegyada, lorena toscano hayfernando ellison . So [facility] [phone].    ATENCIÓN: Si habla español, tiene a trinh disposición servicios gratuitos de asistencia lingüística. Llame al [phone].    We comply with applicable federal civil rights laws and [place] laws. We do not discriminate on the basis of race, color, national origin, age, disability sex, sexual orientation or gender identity.            Thank you!     Thank you for choosing [facility] PRIMARY CARE  for your care. Our goal is always to provide you with excellent care. Hearing back from our patients is one way we can continue to improve our services. Please take a few minutes to complete the written survey that you may receive in the mail after your visit with us. Thank you!             Your Updated Medication List - Protect others around you: Learn how to safely use, store and throw away your medicines at www.disposemymeds.org.          This list is accurate as of: 6/27/17  3:55 PM.  Always use your most recent med list.                   Brand Name Dispense Instructions for use Diagnosis    acetaminophen 500 MG tablet    TYLENOL    100 tablet    Take 2 tablets (1,000  mg) by mouth every 6 hours as needed for pain    Closed fracture of one rib of right side, initial encounter       aspirin 81 MG EC tablet    ASPIRIN LOW DOSE    100 tablet    Take 1 tablet (81 mg) by mouth daily    Coronary artery disease involving native heart with angina pectoris, unspecified vessel or lesion type (H)       atorvastatin 80 MG tablet    LIPITOR    60 tablet    TAKE 1 TABLET (80MG) BY MOUTH DAILY    Hyperlipidemia LDL goal <100       benztropine 1 MG tablet    COGENTIN    60 tablet    Take 1 tablet (1 mg) by mouth 2 times daily    Tardive dyskinesia       blood glucose monitoring lancets     1 Box    Use to test blood sugar 2 times daily or as directed.  Ok to substitute alternative if insurance prefers.    Type 2 diabetes mellitus with diabetic neuropathy, with long-term current use of insulin (H)       citalopram 20 MG tablet    celeXA    30 tablet    Take 1 tablet (20 mg) by mouth daily    Schizoaffective disorder, depressive type (H)       DOK PLUS 8.6-50 MG per tablet   Generic drug:  senna-docusate     100 tablet    TAKE 1 TABLET BY MOUTH TWICE A DAY AS NEEDED FOR CONSTIPATION    Constipation, unspecified constipation type       gabapentin 300 MG capsule    NEURONTIN    168 capsule    TAKE 2 CAPSULES (600MG) BY MOUTH THREE TIMES A DAY    Type 2 diabetes mellitus with diabetic neuropathy, with long-term current use of insulin (H)       glucose 4 G Chew chewable tablet     20 tablet    Take 2 every 15 minutes for blood sugar <70mg/dL. Recheck blood sugar every 15 minutes until above 70mg/dL, then eat a substantial meal.    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, with long-term current use of insulin, unspecified laterality (H)       haloperidol 5 MG tablet    HALDOL    30 tablet    Take 1 tablet (5 mg) by mouth At Bedtime    Schizoaffective disorder, depressive type (H)       hydrocortisone 1 % ointment     30 g    Apply sparingly to affected area  three times daily for 14 days.    Bug bites, initial encounter       ibuprofen 600 MG tablet    ADVIL/MOTRIN    60 tablet    Take 1 tablet (600 mg) by mouth every 4 hours as needed for moderate pain    Closed fracture of one rib of right side, initial encounter       LANTUS SOLOSTAR 100 UNIT/ML injection   Generic drug:  insulin glargine     15 mL    INJECT 45 UNITS SUBCUTANEOUSLY EVERY NIGHT AT BEDTIME    Type 2 diabetes mellitus with both eyes affected by mild nonproliferative retinopathy without macular edema, with long-term current use of insulin (H)       levofloxacin 0.5 % ophthalmic solution    QUIXIN    2 Bottle    1 drop in surgical eye as directed - 4x daily for 1 week, then stop    Cataract, left       losartan 100 MG tablet    COZAAR    90 tablet    TAKE 1 TABLET (100MG) BY MOUTH DAILY    Coronary artery disease involving native heart with angina pectoris, unspecified vessel or lesion type (H)       melatonin 5 MG Caps     90 capsule    Take 1 capsule by mouth daily At dinnertime    Insomnia, unspecified type       metoprolol 100 MG 24 hr tablet    TOPROL-XL    60 tablet    TAKE 1 TABLET (100MG) BY MOUTH DAILY    Coronary artery disease involving native heart with angina pectoris, unspecified vessel or lesion type (H)       NovoLOG FLEXPEN 100 UNIT/ML injection   Generic drug:  insulin aspart     15 mL    INJECT 20 UNITS SUBCUTANEOUSLY THREE TIMES A DAY (WITH MEALS), INJECT AN EXTRA 7 UNITS ONCE DAILY FOR BLOOD SUGAR OVER 500    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    Type 2 diabetes mellitus with diabetic neuropathy, with long-term current use of insulin (H)       * order for DME     1 Device    Equipment being ordered: Rolling walker    Falls frequently       * order  for DME     1 each    Hold Novolog if meals are refused.    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, with long-term current use of insulin, unspecified laterality (H)       polyethylene glycol powder    MIRALAX/GLYCOLAX    527 g    TAKE 17 GRAMS (1 CAPFUL) BY MOUTH DAILY    Constipation, unspecified constipation type       prednisoLONE acetate 1 % ophthalmic susp    PRED FORTE    2 Bottle    1 drop in surgical eye as directed, 4x daily after surgery for 1 week, 3x daily for 1 week, 2x daily for 1 week, daily for 1 week, then stop    Cataract, left       ranitidine 300 MG tablet    ZANTAC    30 tablet    Take 1 tablet (300 mg) by mouth At Bedtime    Gastroesophageal reflux disease without esophagitis       TRULICITY 1.5 MG/0.5ML pen   Generic drug:  dulaglutide     2 mL    INJECT 1.5MG SUBCUTANEOUSLY EVERY SEVEN DAYS    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, with long-term current use of insulin, unspecified laterality (H)       ULTICARE MINI 31G X 6 MM   Generic drug:  insulin pen needle     100 each    USE 4 DAILY OR AS DIRECTED    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, with long-term current use of insulin, unspecified laterality (H)       vitamin D3 56190 UNITS capsule    CHOLECALCIFEROL    4 capsule    TAKE 1 CAPSULE (50,000 UNITS) BY MOUTH ONCE A WEEK    Vitamin D deficiency       * Notice:  This list has 2 medication(s) that are the same as other medications prescribed for you. Read the directions carefully, and ask your doctor or other care provider to review them with you.

## 2017-06-27 NOTE — MR AVS SNAPSHOT
After Visit Summary   6/27/2017    Nena Tang    MRN: [id number]           Patient Information     Date Of Birth          1960        Visit Information        Provider Department      6/27/2017 1:00 PM Rowena Weber APRN CNP [facility]        Today's Diagnoses     Asymptomatic postmenopausal status    -  1    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, with long-term current use of insulin, unspecified laterality (H)        CINDY (obstructive sleep apnea)- mild AHI 10.3        Morbid obesity due to excess calories (H)        Schizoaffective disorder, depressive type (H)          Care Instructions    - Take tylenol on schedule for 2 days.   -  Drink more water.           Follow-ups after your visit        Your next 10 appointments already scheduled     Jun 28, 2017 10:30 AM CDT   DX HIP/PELVIS/SPINE with RIDX1   [facility]    [address]  [address]  [facility] [address]              Please do not take any of the following 24 hours prior to the day of your exam: vitamins, calcium tablets, antacids.            Jun 30, 2017   Procedure with Tyra Diaz MD   [facility] [place] [facility] (--)    [address]  [facility] [address]   [phone]            Jul 03, 2017  2:30 PM CDT   Return Visit with ART Wilburn CNP   [facility] ([facility])    [address]  [address]  [facility] [address]   [phone]            Jul 03, 2017  2:30 PM CDT   Return Visit with Richardson Lopez [facility] ([facility])    [address]  [address]  [facility] [address]   [phone]            Jul 06, 2017  8:30 AM CDT   Return Sleep Patient with William Eng MD   [facility]  [facility] ([facility])    [address] [place] [address]  [facility] [address]   [phone]            Aug 02, 2017  2:00 PM CDT   Post-Op with Tyra Diaz MD   Eye Clinic ([facility])    [address]  [address] [facility]  9th Fl Clin 9a  [facility] [address]   [phone]            Nov 15, 2017 10:15 AM CST   RETURN RETINA with Freyuri Chacon MD   Eye Clinic ([facility])    [address]  [address] [facility]  9th Fl Clin 9a  [facility] [address]   [phone]              Who to contact     If you have questions or need follow up information about today's clinic visit or your schedule please contact [facility] directly at [phone].  Normal or non-critical lab and imaging results will be communicated to you by MyChart, letter or phone within 4 business days after the clinic has received the results. If you do not hear from us within 7 days, please contact the clinic through [employer] or phone. If you have a critical or abnormal lab result, we will notify you by phone as soon as possible.  Submit refill requests through [employer] or call your pharmacy and they will forward the refill request to us. Please allow 3 business days for your refill to be completed.          Additional Information About Your Visit        [employer] Information     [employer] gives you secure access to your electronic health record. If you see a primary care provider, you can also send messages to your care team and make appointments. If you have questions, please call your primary care clinic.  If you do not have a primary care provider, please call [phone] and they will assist you.        Care EveryWhere ID     This is your Care EveryWhere ID. This could be used by other organizations to access your [place] medical records  [id number]        Your Vitals Were     Pulse Temperature Respirations Last Period Pulse  Oximetry BMI (Body Mass Index)    68 98.4  F (36.9  C) (Oral) 14 01/06/2015 100% 43.94 kg/m2       Blood Pressure from Last 3 Encounters:   06/27/17 102/60   06/13/17 118/74   06/08/17 110/68    Weight from Last 3 Encounters:   06/27/17 225 lb (102.1 kg)   06/13/17 225 lb (102.1 kg)   06/08/17 225 lb 8 oz (102.3 kg)              We Performed the Following     Albumin Random Urine Quantitative     COMPREHENSIVE METABOLIC PANEL     HEMOGLOBIN A1C     Lipid panel reflex to direct LDL        Primary Care Provider Office Phone # Fax #    ART Wilburn [phone]       [facility] PRIMARY CARE [address] [facility] [address]  [facility] [address]        Goals        General    Medical (pt-stated)     Notes - Note created  7/7/2016  9:15 AM by Chelsea Pulido RN    Get blood sugars under control    Improve medication compliance    As of today's date 7/7/2016 goal is met at 0 - 25%.   Goal Status:  Active          Equal Access to Services     [facility]: Hadii smaira shane hadso Sodelphine, waaxda lutanikaadaha, qaybta kaalmasilvia catalan, lorena ellison . So [facility] [phone].    ATENCIÓN: Si habla español, tiene a trinh disposición servicios gratuitos de asistencia lingüística. Smith al [phone].    We comply with applicable federal civil rights laws and [place] laws. We do not discriminate on the basis of race, color, national origin, age, disability sex, sexual orientation or gender identity.            Thank you!     Thank you for choosing [facility] PRIMARY CARE  for your care. Our goal is always to provide you with excellent care. Hearing back from our patients is one way we can continue to improve our services. Please take a few minutes to complete the written survey that you may receive in the mail after your visit with us. Thank you!             Your Updated Medication List - Protect others around you: Learn how to safely use, store and throw  away your medicines at www.disposemymeds.org.          This list is accurate as of: 6/27/17  1:56 PM.  Always use your most recent med list.                   Brand Name Dispense Instructions for use Diagnosis    acetaminophen 500 MG tablet    TYLENOL    100 tablet    Take 2 tablets (1,000 mg) by mouth every 6 hours as needed for pain    Closed fracture of one rib of right side, initial encounter       aspirin 81 MG EC tablet    ASPIRIN LOW DOSE    100 tablet    Take 1 tablet (81 mg) by mouth daily    Coronary artery disease involving native heart with angina pectoris, unspecified vessel or lesion type (H)       atorvastatin 80 MG tablet    LIPITOR    60 tablet    TAKE 1 TABLET (80MG) BY MOUTH DAILY    Hyperlipidemia LDL goal <100       benztropine 1 MG tablet    COGENTIN    60 tablet    Take 1 tablet (1 mg) by mouth 2 times daily    Tardive dyskinesia       blood glucose monitoring lancets     1 Box    Use to test blood sugar 2 times daily or as directed.  Ok to substitute alternative if insurance prefers.    Type 2 diabetes mellitus with diabetic neuropathy, with long-term current use of insulin (H)       citalopram 20 MG tablet    celeXA    30 tablet    Take 1 tablet (20 mg) by mouth daily    Schizoaffective disorder, depressive type (H)       DOK PLUS 8.6-50 MG per tablet   Generic drug:  senna-docusate     100 tablet    TAKE 1 TABLET BY MOUTH TWICE A DAY AS NEEDED FOR CONSTIPATION    Constipation, unspecified constipation type       gabapentin 300 MG capsule    NEURONTIN    168 capsule    TAKE 2 CAPSULES (600MG) BY MOUTH THREE TIMES A DAY    Type 2 diabetes mellitus with diabetic neuropathy, with long-term current use of insulin (H)       glucose 4 G Chew chewable tablet     20 tablet    Take 2 every 15 minutes for blood sugar <70mg/dL. Recheck blood sugar every 15 minutes until above 70mg/dL, then eat a substantial meal.    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    Bug bites, initial encounter       ibuprofen 600 MG tablet    ADVIL/MOTRIN    60 tablet    Take 1 tablet (600 mg) by mouth every 4 hours as needed for moderate pain    Closed fracture of one rib of right side, initial encounter       LANTUS SOLOSTAR 100 UNIT/ML injection   Generic drug:  insulin glargine     15 mL    INJECT 45 UNITS SUBCUTANEOUSLY EVERY NIGHT AT BEDTIME    Type 2 diabetes mellitus with both eyes affected by mild nonproliferative retinopathy without macular edema, with long-term current use of insulin (H)       levofloxacin 0.5 % ophthalmic solution    QUIXIN    2 Bottle    1 drop in surgical eye as directed - 4x daily for 1 week, then stop    Cataract, left       losartan 100 MG tablet    COZAAR    90 tablet    TAKE 1 TABLET (100MG) BY MOUTH DAILY    Coronary artery disease involving native heart with angina pectoris, unspecified vessel or lesion type (H)       melatonin 5 MG Caps     90 capsule    Take 1 capsule by mouth daily At dinnertime    Insomnia, unspecified type       metoprolol 100 MG 24 hr tablet    TOPROL-XL    60 tablet    TAKE 1 TABLET (100MG) BY MOUTH DAILY    Coronary artery disease involving native heart with angina pectoris, unspecified vessel or lesion type (H)       NovoLOG FLEXPEN 100 UNIT/ML injection   Generic drug:  insulin aspart     15 mL    INJECT 20 UNITS SUBCUTANEOUSLY THREE TIMES A DAY (WITH MEALS), INJECT AN EXTRA 7 UNITS ONCE DAILY FOR BLOOD SUGAR OVER 500    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, with long-term current use of insulin, unspecified laterality (H)       ONE TOUCH ULTRA test strip    Generic drug:  blood glucose monitoring     150 strip    TEST TWICE DAILY AS DIRECTED.    Type 2 diabetes mellitus with diabetic neuropathy, with long-term current use of insulin (H)       * order for DME     1 Device    Equipment being ordered: Rolling walker    Falls frequently       * order for DME     1 each    Hold Novolog if meals are refused.    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, with long-term current use of insulin, unspecified laterality (H)       polyethylene glycol powder    MIRALAX/GLYCOLAX    527 g    TAKE 17 GRAMS (1 CAPFUL) BY MOUTH DAILY    Constipation, unspecified constipation type       prednisoLONE acetate 1 % ophthalmic susp    PRED FORTE    2 Bottle    1 drop in surgical eye as directed, 4x daily after surgery for 1 week, 3x daily for 1 week, 2x daily for 1 week, daily for 1 week, then stop    Cataract, left       ranitidine 300 MG tablet    ZANTAC    30 tablet    Take 1 tablet (300 mg) by mouth At Bedtime    Gastroesophageal reflux disease without esophagitis       TRULICITY 1.5 MG/0.5ML pen   Generic drug:  dulaglutide     2 mL    INJECT 1.5MG SUBCUTANEOUSLY EVERY SEVEN DAYS    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, with long-term current use of insulin, unspecified laterality (H)       ULTICARE MINI 31G X 6 MM   Generic drug:  insulin pen needle     100 each    USE 4 DAILY OR AS DIRECTED    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, with long-term current use of insulin, unspecified laterality (H)       vitamin D3 60434 UNITS capsule    CHOLECALCIFEROL    4 capsule    TAKE 1 CAPSULE (50,000 UNITS) BY MOUTH ONCE A WEEK    Vitamin D deficiency       * Notice:  This list has 2 medication(s) that are the same as other medications prescribed for you. Read the directions carefully, and ask your doctor or other care provider to review them with you.

## 2017-06-27 NOTE — NURSING NOTE
Chief Complaint   Patient presents with     Pain       Initial /60  Pulse 68  Temp 98.4  F (36.9  C) (Oral)  Resp 14  Wt 225 lb (102.1 kg)  LMP 01/06/2015  SpO2 100%  BMI 43.94 kg/m2 Estimated body mass index is 43.94 kg/(m^2) as calculated from the following:    Height as of 5/18/17: 5' (1.524 m).    Weight as of this encounter: 225 lb (102.1 kg).  Medication Reconciliation: complete       Haley Shelby MA

## 2017-06-27 NOTE — PROGRESS NOTES
SUBJECTIVE:                                                    Nena Tang is a 57 year old female who presents to clinic today for the following health issues:  Patient Description:  female accompanied by caregiver.     Patient is here to Establish care and Right side low back pain.     Patient reports that she is doing ok since her psychiatrist started her on new medications. Patient lives in a home that has has three other residents that have mental illness. Patient is accompanied by her caregiver who is currently practicing as a nurse and helps with the patients in the home.     Patient reports that she sees a man in her room when the lights are off. She states that the man tells her to kill herself, but she does not want to do that. Patient reports that she only sees the man in her room and only at night when her lights are off.     Patient reports that she has been having trouble sleeping at night because her CPAP mask does not fit her well and it keeps falling off. According to the patient's caregiver, the patient needs a new mask for her CPAP. Patient reports that she is scheduled to go fit for a new mask. Patient does report that her sleep has been better since the haldol dose was increased.     Patient reports that she sees both a therapist and a psychiatrist.    Musculoskeletal problem/pain  Patient reports that she has been having right lower back sharp pain, she reports that this has been ongoing for about a week. Patient reports that movement increases the pain, especially in the morning when she is getting out of bed.   Caregiver reports that patient recently got a new mattress and it's softer. She also reports that the patient usually sleeps on her right side for the most part of the night and this could be a positional pain.     Duration: about a week.    Description  Location: Right side lower back pain    Intensity:  moderate    Accompanying signs and symptoms:  none    History  Previous similar problem: no   Previous evaluation:  none    Precipitating or alleviating factors:  Trauma or overuse: no   Aggravating factors include: sitting and standing    Therapies tried and outcome: nothing. Patient reports that she has taken some tylenol and it takes away the pain.     Weight: Patient reports that she has some shortness of breath when she walks about 2 blocks. Patient denies any smoking or any history of asthma. Patient understands that her weight might play a big role in her exertion with activity. Discussed with patient that continued walking and exercising will decrease her demand. Patient reports that her appetite has been too good. And she understands the needs to walk often to decrease the short of breath feeling that she gets with activity.     Diabetes   Patient is checking blood sugars: twice daily.    Blood sugar testing frequency justification: Adjustment of medication(s)  Results are as follows:         118-120    Diabetic concerns: None     Symptoms of hypoglycemia (low blood sugar): none     Paresthesias (numbness or burning in feet) or sores: No     Date of last diabetic eye exam: current    Depression and Anxiety Follow-Up    Status since last visit: improving    Other associated symptoms:None    Complicating factors:     Significant life event: Yes-  Patient reports that her sister is in and out of the hospital constantly and family has said to patient that her sister might die. Patient states that going to [employer] and praying has been helping her deal with this situation.      Current substance abuse: None  Patient denies being anxious.     PHQ-9 SCORE 12/20/2016 1/2/2017 2/3/2017   Total Score - - -   Total Score 7 0 0     TAMIA-7 SCORE 12/20/2016 1/2/2017 2/3/2017   Total Score - - -   Total Score 6 0 0       PHQ-9  English  PHQ-9   Any Language  GAD7    Problem list and histories reviewed & adjusted, as indicated.  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)     Illiterate     Irritable bowel syndrome     overweight - BMI >35     Takotsubo cardiomyopathy     CAD (coronary artery disease)     Restless legs syndrome (RLS)     CINDY (obstructive sleep apnea)- mild AHI 10.3     Verbal auditory hallucination     Chronic low back pain     Schizoaffective disorder, depressive type (H)     Migraine headache     HTN, goal below 140/90     Health Care Home     Lumbago     Cervicalgia     Cocaine abuse, episodic     Suicidal ideation     Esophageal reflux     Mild nonproliferative diabetic retinopathy (H)     Tardive dyskinesia     Alcohol use     Left cataract     Falls frequently     History of uterine cancer     Depression     Psychophysiological insomnia     Dysuria     Past Surgical History:   Procedure Laterality Date     C OOPHORECTORMY FOR RAHEL, W/BX  1983    UTERINE     COLONOSCOPY N/A 3/16/2017    Procedure: COLONOSCOPY;  Surgeon: Traci Gonzalez MD;  Location:  GI     Coronary CTA  5/21/2014     HYSTERECTOMY  1983    uterine cancer yearly pap's per provider.     LAPAROSCOPIC CHOLECYSTECTOMY  2008     PHACOEMULSIFICATION CLEAR CORNEA WITH STANDARD INTRAOCULAR LENS IMPLANT Left 5/5/2017    Procedure: PHACOEMULSIFICATION CLEAR CORNEA WITH STANDARD INTRAOCULAR LENS IMPLANT;  LEFT EYE PHACOEMULSIFICATION CLEAR CORNEA WITH STANDARD INTRAOCULAR LENS IMPLANT ;  Surgeon: Tyra Diaz MD;  Location:  [facility]     RELEASE TRIGGER FINGER  10/11/2012    Left thumb. Procedure: RELEASE TRIGGER FINGER;  LEFT THUMB TRIGGER RELEASE;  Surgeon: Tay Langley MD;  Location:  [facility]     RELEASE TRIGGER FINGER Right 12/26/2016    Procedure: RELEASE TRIGGER FINGER;  Surgeon: Albino Castañeda MD;   Location: [facility]       Social History   Substance Use Topics     Smoking status: Never Smoker     Smokeless tobacco: Never Used     Alcohol use No      Comment: last month     Family History   Problem Relation Age of Onset     CANCER Mother      BLADDER     Respiratory Mother      COPD     GASTROINTESTINAL DISEASE Mother      CIRRHOSIS OF LI BOLIVAR     Alcohol/Drug Mother      DIABETES Mother      Hypertension Mother      Lipids Mother      C.A.D. Mother      Glaucoma Mother      Alcohol/Drug Sister      MENTAL ILLNESS Sister      Alcohol/Drug Sister      Psychotic Disorder Sister      CANCER Maternal Grandmother      UNKNOWN TYPE     CANCER Brother      COLON     Cancer - colorectal Brother      IN HIS LATE 30S     Alcohol/Drug Brother       OF HEROIN OVERDOSE AT AGE 22 YRS     Macular Degeneration No family hx of          Allergies   Allergen Reactions     Imidazole Antifungals Hives     Tolerates diflucan     Ketoprofen Itching     Pruritis to topical     Lisinopril Hives     Metformin Other (See Comments)     Patient hospitalized for lactic acidosis - admitting provider suspectd caused by metformin     Metronidazole Hives     Posaconazole Hives     Tolerates diflucan     BP Readings from Last 3 Encounters:   17 102/60   17 118/74   17 110/68    Wt Readings from Last 3 Encounters:   17 225 lb (102.1 kg)   17 225 lb (102.1 kg)   17 225 lb 8 oz (102.3 kg)         Labs reviewed in EPIC    Reviewed and updated as needed this visit by clinical staff  Tobacco  Allergies  Meds  Med Hx  Surg Hx  Fam Hx  Soc Hx      Reviewed and updated as needed this visit by Provider    ROS:  Constitutional, HEENT, cardiovascular, pulmonary, gi and gu systems are negative, except as otherwise noted.    OBJECTIVE:     /60  Pulse 68  Temp 98.4  F (36.9  C) (Oral)  Resp 14  Wt 225 lb (102.1 kg)  LMP 2015  SpO2 100%  BMI 43.94 kg/m2  Body mass index is 43.94 kg/(m^2).  GENERAL:  healthy, alert and no distress  EYES: Eyes grossly normal to inspection, PERRL and conjunctivae and sclerae normal  HENT: ear canals and TM's normal, nose and mouth without ulcers or lesions  NECK: no adenopathy, no asymmetry, masses, or scars and thyroid normal to palpation  RESP: lungs clear to auscultation - no rales, rhonchi or wheezes  BREAST: normal without masses, tenderness or nipple discharge and no palpable axillary masses or adenopathy  CV: regular rate and rhythm, normal S1 S2, no S3 or S4, no murmur, click or rub, no peripheral edema and peripheral pulses strong  ABDOMEN: soft, nontender, no hepatosplenomegaly, no masses and bowel sounds normal  MS: no gross musculoskeletal defects noted, no edema  SKIN: no suspicious lesions or rashes  NEURO: Normal strength and tone, mentation intact and speech normal  PSYCH: mentation appears normal, affect normal/bright  LYMPH: no cervical, supraclavicular, axillary, or inguinal adenopathy  Diabetic foot exam: normal DP and PT pulses, no trophic changes or ulcerative lesions and normal sensory exam    Diagnostic Test Results:  Results for orders placed or performed in visit on 06/27/17   COMPREHENSIVE METABOLIC PANEL   Result Value Ref Range    Sodium 138 133 - 144 mmol/L    Potassium 4.3 3.4 - 5.3 mmol/L    Chloride 106 94 - 109 mmol/L    Carbon Dioxide 24 20 - 32 mmol/L    Anion Gap 8 3 - 14 mmol/L    Glucose 183 (H) 70 - 99 mg/dL    Urea Nitrogen 18 7 - 30 mg/dL    Creatinine 0.78 0.52 - 1.04 mg/dL    GFR Estimate 76 >60 mL/min/1.7m2    GFR Estimate If Black >90   GFR Calc   >60 mL/min/1.7m2    Calcium 9.0 8.5 - 10.1 mg/dL    Bilirubin Total 0.3 0.2 - 1.3 mg/dL    Albumin 3.5 3.4 - 5.0 g/dL    Protein Total 7.6 6.8 - 8.8 g/dL    Alkaline Phosphatase 113 40 - 150 U/L    ALT 32 0 - 50 U/L    AST 19 0 - 45 U/L   HEMOGLOBIN A1C   Result Value Ref Range    Hemoglobin A1C 7.0 (H) 4.3 - 6.0 %   Lipid panel reflex to direct LDL   Result Value Ref Range     Cholesterol 154 <200 mg/dL    Triglycerides 267 (H) <150 mg/dL    HDL Cholesterol 37 (L) >49 mg/dL    LDL Cholesterol Calculated 64 <100 mg/dL    Non HDL Cholesterol 117 <130 mg/dL   Albumin Random Urine Quantitative   Result Value Ref Range    Creatinine Urine 160 mg/dL    Albumin Urine mg/L 11 mg/L    Albumin Urine mg/g Cr 6.81 0 - 25 mg/g Cr     *Note: Due to a large number of results and/or encounters for the requested time period, some results have not been displayed. A complete set of results can be found in Results Review.       ASSESSMENT/PLAN:   (E11.3299,  Z79.4) 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, with long-term current use of insulin, unspecified laterality (H)  (primary encounter diagnosis)  Comment: ongoing  Plan: COMPREHENSIVE METABOLIC PANEL, HEMOGLOBIN A1C,         Lipid panel reflex to direct LDL, Albumin         Random Urine Quantitative        -Will follow up with MTM about diabetes management.    (F25.1) Schizoaffective disorder, depressive type (H)  Comment: Patient reports visual hallucinations.   Plan: Continue current therapy.    (G47.33) CINDY (obstructive sleep apnea)- mild AHI 10.3  Comment: Patient having problems with CPAP mask.   Plan: Patient reports that she will be getting a new mask for her CPAP machine.     (E66.01) Morbid obesity due to excess calories (H)  Comment: Exertion with activity due to increased weight.   Plan: Encouraged lifestyle changes: increase in activity and making healthy food choices.      (Z00.00) Medicare annual wellness visit, subsequent  Comment: Health Maintenance.   Plan: -Labs done.   -Will follow-up on next visit and discuss results.   -Include MTM for diabetes teaching and blood sugar management. Focus on lowering the A1C.     (M54.5) Acute right-sided low back pain without sciatica  Comment: Lower right side tender to palpation. Patient denies any dysuria, or CVA tenderness.Denies any fever, or chills.   Last UA done 6/12/2017  Plan: Encourage patient to continue using tylenol as needed.   -Encouraged patient to try some stretches when out of bed.   -Patient reports that pt's mattress will be addressed to decrease discomfort.   -Symptom check at the next visit.     Patient Instructions   - Take tylenol on schedule for 2 days.   -  Drink more water.         ART [facility]

## 2017-06-27 NOTE — TELEPHONE ENCOUNTER
Rx refilled per RN protocol.      LANTUS SOLOSTAR 100 UNIT/ML soln     Last Written Prescription Date: 3/20/17  Last Fill Quantity: 15ml, # refills: 3  Last Office Visit with [facility], P or [facility] prescribing provider:  6/27/17  Next 5 appointments (look out 90 days)     Jul 03, 2017  2:30 PM CDT   Return Visit with ART Wilburn CNP   [facility]    [address]  [address]  [facility] [address]   [phone]            Jul 03, 2017  2:30 PM CDT   Return Visit with BIN Mondragon   [facility]    [address]  [address]  [facility] [address]   [phone]                   BP Readings from Last 3 Encounters:   06/27/17 102/60   06/13/17 118/74   06/08/17 110/68     Lab Results   Component Value Date    MICROL 23 07/13/2016     Lab Results   Component Value Date    UMALCR 11.39 07/13/2016     Creatinine   Date Value Ref Range Status   01/24/2017 0.94 0.52 - 1.04 mg/dL Final   ]  GFR Estimate   Date Value Ref Range Status   01/24/2017 61 >60 mL/min/1.7m2 Final     Comment:     Non  GFR Calc   01/22/2017 90 >60 mL/min/1.7m2 Final     Comment:     Non  GFR Calc   12/29/2016 83 >60 mL/min/1.7m2 Final     Comment:     Non  GFR Calc     GFR Estimate If Black   Date Value Ref Range Status   01/24/2017 74 >60 mL/min/1.7m2 Final     Comment:      GFR Calc   01/22/2017 >90   GFR Calc   >60 mL/min/1.7m2 Final   12/29/2016 >90   GFR Calc   >60 mL/min/1.7m2 Final     Lab Results   Component Value Date    CHOL 147 07/14/2015     Lab Results   Component Value Date    HDL 39 07/14/2015     Lab Results   Component Value Date     07/13/2016    LDL 78 07/14/2015     Lab Results   Component Value Date    TRIG 151 07/14/2015     Lab Results   Component  Value Date    CHOLHDLRATIO 3.8 07/14/2015     Lab Results   Component Value Date    AST 22 01/24/2017     Lab Results   Component Value Date    ALT 24 01/24/2017     Lab Results   Component Value Date    A1C 7.0 06/27/2017    A1C 7.2 03/28/2017    A1C 7.1 01/27/2017    A1C 9.7 11/11/2016    A1C 9.3 08/11/2016     Potassium   Date Value Ref Range Status   01/24/2017 4.6 3.4 - 5.3 mmol/L Final     Indra Line RN

## 2017-06-27 NOTE — PROGRESS NOTES
Called and requested psychiatry records from [facility], records will be sent by end of week.  Rina Norris RN

## 2017-06-28 ENCOUNTER — RADIANT APPOINTMENT (OUTPATIENT)
Dept: BONE DENSITY | Facility: CLINIC | Age: 57
End: 2017-06-28
Attending: PHYSICIAN ASSISTANT
Payer: MEDICARE

## 2017-06-28 DIAGNOSIS — Z78.0 ASYMPTOMATIC POSTMENOPAUSAL STATUS: ICD-10-CM

## 2017-06-28 LAB
ALBUMIN SERPL-MCNC: 3.5 G/DL (ref 3.4–5)
ALP SERPL-CCNC: 113 U/L (ref 40–150)
ALT SERPL W P-5'-P-CCNC: 32 U/L (ref 0–50)
ANION GAP SERPL CALCULATED.3IONS-SCNC: 8 MMOL/L (ref 3–14)
AST SERPL W P-5'-P-CCNC: 19 U/L (ref 0–45)
BILIRUB SERPL-MCNC: 0.3 MG/DL (ref 0.2–1.3)
BUN SERPL-MCNC: 18 MG/DL (ref 7–30)
CALCIUM SERPL-MCNC: 9 MG/DL (ref 8.5–10.1)
CHLORIDE SERPL-SCNC: 106 MMOL/L (ref 94–109)
CHOLEST SERPL-MCNC: 154 MG/DL
CO2 SERPL-SCNC: 24 MMOL/L (ref 20–32)
CREAT SERPL-MCNC: 0.78 MG/DL (ref 0.52–1.04)
CREAT UR-MCNC: 160 MG/DL
GFR SERPL CREATININE-BSD FRML MDRD: 76 ML/MIN/1.7M2
GLUCOSE SERPL-MCNC: 183 MG/DL (ref 70–99)
HDLC SERPL-MCNC: 37 MG/DL
LDLC SERPL CALC-MCNC: 64 MG/DL
MICROALBUMIN UR-MCNC: 11 MG/L
MICROALBUMIN/CREAT UR: 6.81 MG/G CR (ref 0–25)
NONHDLC SERPL-MCNC: 117 MG/DL
POTASSIUM SERPL-SCNC: 4.3 MMOL/L (ref 3.4–5.3)
PROT SERPL-MCNC: 7.6 G/DL (ref 6.8–8.8)
SODIUM SERPL-SCNC: 138 MMOL/L (ref 133–144)
TRIGL SERPL-MCNC: 267 MG/DL

## 2017-06-28 PROCEDURE — 77080 DXA BONE DENSITY AXIAL: CPT | Performed by: INTERNAL MEDICINE

## 2017-06-28 RX ORDER — LEVOFLOXACIN 5 MG/ML
SOLUTION/ DROPS TOPICAL
Qty: 2 BOTTLE | Refills: 1 | Status: ON HOLD | OUTPATIENT
Start: 2017-06-28 | End: 2017-08-29

## 2017-06-28 RX ORDER — PREDNISOLONE ACETATE 10 MG/ML
SUSPENSION/ DROPS OPHTHALMIC
Qty: 1 BOTTLE | Refills: 1 | Status: ON HOLD | OUTPATIENT
Start: 2017-06-28 | End: 2017-08-29

## 2017-06-29 NOTE — H&P (VIEW-ONLY)
[facility] PRIMARY CARE  [address]  [address]  [facility] [address]  [phone]  Dept: [phone]    PRE-OP EVALUATION:  Today's date: 2017    Nena Tang (: 1960) presents for pre-operative evaluation assessment as requested by Dr. Tyra Diaz.  She requires evaluation and anesthesia risk assessment prior to undergoing surgery/procedure for treatment of PHACOEMULSIFICATION CLEAR CORNEA WITH STANDARD INTRAOCULAR LENS IMPLANT .  Proposed procedure: PHACOEMULSIFICATION CLEAR CORNEA WITH STANDARD INTRAOCULAR LENS IMPLANT    Date of Surgery/ Procedure: 2017  Time of Surgery/ Procedure: 7:30AM  Hospital/Surgical Facility: [facility]    Primary Physician: No primary care provider on file.  Type of Anesthesia Anticipated: Combined MAC with Topical    Patient has a Health Care Directive or Living Will:  NO    1. Yes- Do you have a history of heart attack, stroke, stent, bypass or surgery on an artery in the head, neck, heart or legs? Heart attack 4-5 years ago   2. NO - Do you ever have any pain or discomfort in your chest?  3. NO - Do you have a history of  Heart Failure?  4. YES - ARE YOUR TROUBLED BY SHORTNESS OF BREATH WHEN WALKING ON THE LEVEL, UP A SLIGHT HILL OR AT NIGHT?   5. NO - Do you currently have a cold, bronchitis or other respiratory infection?  6. NO - Do you have a cough, shortness of breath or wheezing?  7. YES - Do you sometimes get pains in the calves of your legs when you walk?  8. NO - Do you or anyone in your family have previous history of blood clots?  9. NO - Do you or does anyone in your family have a serious bleeding problem such as prolonged bleeding following surgeries or cuts?  10. NO - Have you ever had problems with anemia or been told to take iron pills?  11. NO - Have you had any abnormal blood loss such as black, tarry or bloody stools, or abnormal vaginal bleeding?  12. NO - Have you ever had a blood  transfusion?  13. NO - Have you or any of your relatives ever had problems with anesthesia?  14. YES- Do you have sleep apnea, excessive snoring or daytime drowsiness?  15. NO - Do you have any prosthetic heart valves?  16. NO - Do you have prosthetic joints?  17. NO - Is there any chance that you may be pregnant?        HPI:                                                      Brief HPI related to upcoming procedure: Patient presents for replacement of Lens right eye.        DIABETES - Patient has a longstanding history of DiabetesType Type II . Patient is being treated with insulin injections and denies significant side effects. Control has been fair. Complicating factors include but are not limited to: eye, obesity, and sedentary.                                                                                                             .    MEDICAL HISTORY:                                                      Patient Active Problem List    Diagnosis Date Noted     CAD (coronary artery disease) 02/29/2012     Priority: High      Tako-Tsubo stress cardiomyopathy 2009. Mild coronary artery disease,        Moderate major depression (H) 06/08/2011     Priority: High     Psychophysiological insomnia 03/09/2017     Priority: Medium     Depression 12/28/2016     Priority: Medium     Schizoaffective disorder (H) 12/20/2016     Priority: Medium     History of uterine cancer 12/07/2016     Priority: Medium     Yearly pap's per the provider office visit note on 12/07/16.       Falls frequently 08/18/2016     Priority: Medium     Left cataract 07/25/2016     Priority: Medium     Alcohol use 04/19/2016     Priority: Medium     Tardive dyskinesia 09/11/2015     Priority: Medium     Mild nonproliferative diabetic retinopathy (H) 06/19/2014     Priority: Medium     Problem list name updated by automated process. Provider to review       Esophageal reflux 06/09/2014     Priority: Medium     Suicidal ideation 05/01/2014      Priority: Medium     Cocaine abuse, episodic 10/03/2013     Priority: Medium     Lumbago 09/20/2013     Priority: Medium     Cervicalgia 09/20/2013     Priority: Medium     Health Care Home 08/16/2013     Priority: Medium     EMERGENCY CARE PLAN  Presenting Problem Signs and Symptoms Treatment Plan    Questions or concerns during clinic hours    I will call the clinic directly     Questions or concerns outside clinic hours    I will call the 24 hour nurse line at [phone]    Patient needs to schedule an appointment    I will call the 24 hour scheduling team at [phone] or clinic directly    Same day treatment     I will call the clinic first, nurse line if after hours, urgent care and express care if needed     Primary Care Provider Dr. Honorio Dias [facility] [phone]  Nurse Care Coordinator Idalmis Nettles [phone]        HTN, goal below 140/90 07/29/2013     Priority: Medium     Migraine headache 04/22/2013     Priority: Medium     Schizoaffective disorder, depressive type (H) 02/25/2013     Priority: Medium     Chronic low back pain 01/22/2013     Priority: Medium     Verbal auditory hallucination 10/04/2012     Priority: Medium     CINDY (obstructive sleep apnea)- mild AHI 10.3 03/08/2012     Priority: Medium     Sleep study 3/12 (198#)-   AHI 10.3, with significant desaturations down to 74%. RDI 10.3. Periodic Limb Movement Index 3.2/hour.         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 Echo 2010, angio 2011 with normal LVF.        Vitamin  B12 deficiency without anemia 11/23/2009     Priority: Low     Diagnosis updated by automated process. Provider to review and confirm.       Osteopenia 10/07/2009     Priority: Low     Dexa 2009- Lumbar Spine (L1-L4): T-score -1.8, Left Femoral Neck: T-score -1.0, Right Femoral Neck: T-score -1.0             Past Medical History:   Diagnosis Date     CAD (coronary artery disease)     5/2014 cath, nonbostructive stenosis to LAD, RCA.     Chronic low back pain 1/22/2013     Cocaine abuse, in remission      Fecal urgency 3/8/2012     History of heroin abuse      Hyperlipidemia LDL goal <100 10/31/2010     Hypertension 7/29/2013     Illiterate 8/30/2011     Irritable bowel syndrome      Migraine 4/19/2012     Migraine headache 4/22/2013     Moderate major depression (H) 6/8/2011     Noncompliance with medication regimen 6/8/2011     Obesity      CINDY (obstructive sleep apnea) 3/8/2012    uses CPAP     Osteopenia 10/7/2009     Schizoaffective disorder, depressive type (H) 2/25/2013     Suicidal intent 10/2/2013     Takotsubo cardiomyopathy      Type 2 diabetes mellitus (H) 8/30/2011     Uterine cancer (H) 1983     Verbal auditory hallucination 10/4/2012     Past Surgical History:   Procedure Laterality Date     C OOPHORECTORMY FOR MALIG, W/BX  1983    UTERINE     COLONOSCOPY N/A 3/16/2017    Procedure: COLONOSCOPY;  Surgeon: Traci Gonzalez MD;  Location:  GI     Coronary CTA  5/21/2014     HYSTERECTOMY  1983    uterine cancer yearly pap's per provider.     LAPAROSCOPIC CHOLECYSTECTOMY  2008     PHACOEMULSIFICATION CLEAR CORNEA WITH STANDARD INTRAOCULAR LENS IMPLANT Left 5/5/2017    Procedure: PHACOEMULSIFICATION CLEAR CORNEA WITH STANDARD INTRAOCULAR LENS IMPLANT;  LEFT EYE PHACOEMULSIFICATION CLEAR CORNEA WITH STANDARD INTRAOCULAR LENS IMPLANT ;  Surgeon: Tyra Diaz MD;  Location: [facility]     RELEASE TRIGGER FINGER  10/11/2012    Left thumb. Procedure: RELEASE TRIGGER FINGER;  LEFT THUMB TRIGGER  RELEASE;  Surgeon: Tay Langley MD;  Location:  SD     RELEASE TRIGGER FINGER Right 12/26/2016    Procedure: RELEASE TRIGGER FINGER;  Surgeon: Albino Castañeda MD;  Location:  OR     Current Outpatient Prescriptions   Medication Sig Dispense Refill     ranitidine (ZANTAC) 300 MG tablet Take 1 tablet (300 mg) by mouth At Bedtime 30 tablet 1     hydrocortisone 1 % ointment Apply sparingly to affected area three times daily for 14 days. 30 g 0     vitamin D3 (CHOLECALCIFEROL) 88190 UNITS capsule TAKE 1 CAPSULE (50,000 UNITS) BY MOUTH ONCE A WEEK 4 capsule 3     DOK PLUS 50-8.6 MG per tablet TAKE 1 TABLET BY MOUTH TWICE A DAY AS NEEDED FOR CONSTIPATION 100 tablet 3     losartan (COZAAR) 100 MG tablet TAKE 1 TABLET (100MG) BY MOUTH DAILY 90 tablet 1     atorvastatin (LIPITOR) 80 MG tablet TAKE 1 TABLET (80MG) BY MOUTH DAILY 60 tablet 1     metoprolol (TOPROL-XL) 100 MG 24 hr tablet TAKE 1 TABLET (100MG) BY MOUTH DAILY 60 tablet 1     gabapentin (NEURONTIN) 300 MG capsule TAKE 2 CAPSULES (600MG) BY MOUTH THREE TIMES A  capsule 3     TRULICITY 1.5 MG/0.5ML pen INJECT 1.5MG SUBCUTANEOUSLY EVERY SEVEN DAYS 2 mL 11     levofloxacin (QUIXIN) 0.5 % ophthalmic solution 1 drop in surgical eye as directed - 4x daily for 1 week, then stop 2 Bottle 1     prednisoLONE acetate (PRED FORTE) 1 % ophthalmic susp 1 drop in surgical eye as directed, 4x daily after surgery for 1 week, 3x daily for 1 week, 2x daily for 1 week, daily for 1 week, then stop 2 Bottle 1     ONE TOUCH ULTRA test strip TEST TWICE DAILY AS DIRECTED. 150 strip 11     polyethylene glycol (MIRALAX/GLYCOLAX) powder TAKE 17 GRAMS (1 CAPFUL) BY MOUTH DAILY 527 g 3     NOVOLOG FLEXPEN 100 UNIT/ML soln INJECT 20 UNITS SUBCUTANEOUSLY THREE TIMES A DAY (WITH MEALS), INJECT AN EXTRA 7 UNITS ONCE DAILY FOR BLOOD SUGAR OVER 500 15 mL 3     ULTICARE MINI 31G X 6 MM insulin pen needle USE 4 DAILY OR AS DIRECTED 100 each 11     LANTUS SOLOSTAR 100 UNIT/ML soln  INJECT 45 UNITS SUBCUTANEOUSLY EVERY NIGHT AT BEDTIME 15 mL 3     aspirin (ASPIRIN LOW DOSE) 81 MG EC tablet Take 1 tablet (81 mg) by mouth daily 100 tablet 4     acetaminophen (TYLENOL) 500 MG tablet Take 2 tablets (1,000 mg) by mouth every 6 hours as needed for pain 100 tablet 0     ibuprofen (ADVIL,MOTRIN) 600 MG tablet Take 1 tablet (600 mg) by mouth every 4 hours as needed for moderate pain 60 tablet 0     melatonin 5 MG CAPS Take 1 capsule by mouth daily At dinnertime 90 capsule 1     glucose 4 G CHEW Take 2 every 15 minutes for blood sugar <70mg/dL. Recheck blood sugar every 15 minutes until above 70mg/dL, then eat a substantial meal. 20 tablet 1     order for DME Hold Novolog if meals are refused. 1 each 0     blood glucose monitoring (ONE TOUCH DELICA) lancets Use to test blood sugar 2 times daily or as directed.  Ok to substitute alternative if insurance prefers. 1 Box prn     citalopram (CELEXA) 20 MG tablet Take 1 tablet (20 mg) by mouth daily 30 tablet 2     haloperidol (HALDOL) 5 MG tablet Take 1 tablet (5 mg) by mouth At Bedtime 30 tablet 2     benztropine (COGENTIN) 1 MG tablet Take 1 tablet (1 mg) by mouth 2 times daily 60 tablet 3     order for DME Equipment being ordered: Rolling walker 1 Device 0     OTC products: None, except as noted above    Allergies   Allergen Reactions     Imidazole Antifungals Hives     Tolerates diflucan     Ketoprofen Itching     Pruritis to topical     Lisinopril Hives     Metformin Other (See Comments)     Patient hospitalized for lactic acidosis - admitting provider suspectd caused by metformin     Metronidazole Hives     Posaconazole Hives     Tolerates diflucan      Latex Allergy: NO    Social History   Substance Use Topics     Smoking status: Never Smoker     Smokeless tobacco: Never Used     Alcohol use No      Comment: last month     History   Drug Use No     Comment: history of       REVIEW OF SYSTEMS:                                                    C:  NEGATIVE for fever, chills, change in weight  E/M: NEGATIVE for ear, mouth and throat problems  R: NEGATIVE for significant cough or SOB  CV: NEGATIVE for chest pain, palpitations or peripheral edema  PSYCHIATRIC: NEGATIVE for changes in mood or affect  ROS otherwise negative    EXAM:                                                    /68  Pulse 88  Temp 98.1  F (36.7  C) (Oral)  Resp 16  Wt 225 lb 8 oz (102.3 kg)  LMP 01/06/2015  SpO2 94%  BMI 44.04 kg/m2  GENERAL APPEARANCE: healthy, alert and no distress  HENT: ear canals and TM's normal and nose and mouth without ulcers or lesions  RESP: lungs clear to auscultation - no rales, rhonchi or wheezes  CV: regular rate and rhythm, normal S1 S2, no S3 or S4 and no murmur, click or rub   ABDOMEN: soft, nontender, no HSM or masses and bowel sounds normal  NEURO: Normal strength and tone, sensory exam grossly normal, mentation intact and speech normal, Tardive dyskinesia evident in tongue lolling.    DIAGNOSTICS:                                                    No labs or EKG required for low risk surgery (cataract, skin procedure, breast biopsy, etc)    Recent Labs   Lab Test  03/28/17   1114  01/27/17   1211  01/24/17   1957  01/22/17   1936   11/11/16   2234   08/29/16 2235   HGB   --    --   10.7*  11.0*   < >  10.2*   < >  11.0*   PLT   --    --   223  217   < >  199   < >  203   INR   --    --    --    --    --   Unsatisfactory specimen - tube underfilled  SPOKE WITH DR VARELA [id number], LY     --   0.86   NA   --    --   141  139   < >  139   < >  139   POTASSIUM   --    --   4.6  4.1   < >  4.5   < >  4.3   CR   --    --   0.94  0.68   < >  0.96   < >  0.66   A1C  7.2*  7.1*   --    --    --   9.7*   --    --     < > = values in this interval not displayed.        IMPRESSION:                                                    The proposed surgical procedure is considered LOW risk.    REVISED CARDIAC RISK INDEX  The patient has the following  serious cardiovascular risks for perioperative complications such as (MI, PE, VFib and 3  AV Block):  Diabetes Mellitus (on Insulin)  INTERPRETATION: 0 risks: Class I (very low risk - 0.4% complication rate)    The patient has the following additional risks for perioperative complications:  No identified additional risks      ICD-10-CM    1. Preop general physical exam Z01.818    2. Gastroesophageal reflux disease without esophagitis K21.9 ranitidine (ZANTAC) 300 MG tablet       RECOMMENDATIONS:                                                      --Patient is to take all scheduled medications on the day of surgery    APPROVAL GIVEN to proceed with proposed procedure, without further diagnostic evaluation       Signed Electronically by: Usman Hodgson PA-C    Copy of this evaluation report is provided to requesting physician.    [facility]ey Preop Guidelines

## 2017-06-30 ENCOUNTER — ANESTHESIA EVENT (OUTPATIENT)
Dept: SURGERY | Facility: CLINIC | Age: 57
End: 2017-06-30
Payer: MEDICARE

## 2017-06-30 ENCOUNTER — OFFICE VISIT (OUTPATIENT)
Dept: OPHTHALMOLOGY | Facility: CLINIC | Age: 57
End: 2017-06-30

## 2017-06-30 ENCOUNTER — SURGERY (OUTPATIENT)
Age: 57
End: 2017-06-30

## 2017-06-30 ENCOUNTER — HOSPITAL ENCOUNTER (OUTPATIENT)
Facility: CLINIC | Age: 57
Discharge: HOME | End: 2017-06-30
Attending: OPHTHALMOLOGY | Admitting: OPHTHALMOLOGY
Payer: MEDICARE

## 2017-06-30 ENCOUNTER — ANESTHESIA (OUTPATIENT)
Dept: SURGERY | Facility: CLINIC | Age: 57
End: 2017-06-30
Payer: MEDICARE

## 2017-06-30 VITALS
HEART RATE: 83 BPM | TEMPERATURE: 97.7 F | DIASTOLIC BLOOD PRESSURE: 69 MMHG | OXYGEN SATURATION: 96 % | HEIGHT: 61 IN | SYSTOLIC BLOOD PRESSURE: 108 MMHG | RESPIRATION RATE: 16 BRPM

## 2017-06-30 DIAGNOSIS — H25.11 AGE-RELATED NUCLEAR CATARACT OF RIGHT EYE: Primary | ICD-10-CM

## 2017-06-30 DIAGNOSIS — Z96.1 PSEUDOPHAKIA, RIGHT EYE: Primary | ICD-10-CM

## 2017-06-30 LAB
GLUCOSE BLDC GLUCOMTR-MCNC: 140 MG/DL (ref 70–99)
GLUCOSE BLDC GLUCOMTR-MCNC: 150 MG/DL (ref 70–99)

## 2017-06-30 PROCEDURE — 25000128 H RX IP 250 OP 636: Performed by: ANESTHESIOLOGY

## 2017-06-30 PROCEDURE — 36000101 ZZH EYE SURGERY LEVEL 3 1ST 30 MIN: Performed by: OPHTHALMOLOGY

## 2017-06-30 PROCEDURE — 25000128 H RX IP 250 OP 636: Performed by: OPHTHALMOLOGY

## 2017-06-30 PROCEDURE — 40000170 ZZH STATISTIC PRE-PROCEDURE ASSESSMENT II: Performed by: OPHTHALMOLOGY

## 2017-06-30 PROCEDURE — 37000008 ZZH ANESTHESIA TECHNICAL FEE, 1ST 30 MIN: Performed by: OPHTHALMOLOGY

## 2017-06-30 PROCEDURE — 25000132 ZZH RX MED GY IP 250 OP 250 PS 637: Performed by: OPHTHALMOLOGY

## 2017-06-30 PROCEDURE — 25000128 H RX IP 250 OP 636: Performed by: NURSE ANESTHETIST, CERTIFIED REGISTERED

## 2017-06-30 PROCEDURE — 71000028 ZZH EYE RECOVERY PHASE 2 EACH 15 MINS: Performed by: OPHTHALMOLOGY

## 2017-06-30 PROCEDURE — V2632 POST CHMBR INTRAOCULAR LENS: HCPCS | Performed by: OPHTHALMOLOGY

## 2017-06-30 PROCEDURE — 27210794 ZZH OR GENERAL SUPPLY STERILE: Performed by: OPHTHALMOLOGY

## 2017-06-30 PROCEDURE — 82962 GLUCOSE BLOOD TEST: CPT

## 2017-06-30 PROCEDURE — 25000125 ZZHC RX 250: Performed by: NURSE ANESTHETIST, CERTIFIED REGISTERED

## 2017-06-30 PROCEDURE — 25000125 ZZHC RX 250: Performed by: OPHTHALMOLOGY

## 2017-06-30 DEVICE — EYE IMP IOL AMO PCL TECNIS ZCB00 21.5: Type: IMPLANTABLE DEVICE | Site: EYE | Status: FUNCTIONAL

## 2017-06-30 RX ORDER — OFLOXACIN 3 MG/ML
1 SOLUTION/ DROPS OPHTHALMIC
Status: COMPLETED | OUTPATIENT
Start: 2017-06-30 | End: 2017-06-30

## 2017-06-30 RX ORDER — PREDNISOLONE ACETATE 10 MG/ML
SUSPENSION/ DROPS OPHTHALMIC PRN
Status: DISCONTINUED | OUTPATIENT
Start: 2017-06-30 | End: 2017-06-30 | Stop reason: HOSPADM

## 2017-06-30 RX ORDER — PHENYLEPHRINE HYDROCHLORIDE 25 MG/ML
1 SOLUTION/ DROPS OPHTHALMIC
Status: COMPLETED | OUTPATIENT
Start: 2017-06-30 | End: 2017-06-30

## 2017-06-30 RX ORDER — PROPARACAINE HYDROCHLORIDE 5 MG/ML
1 SOLUTION/ DROPS OPHTHALMIC ONCE
Status: COMPLETED | OUTPATIENT
Start: 2017-06-30 | End: 2017-06-30

## 2017-06-30 RX ORDER — OFLOXACIN 3 MG/ML
SOLUTION/ DROPS OPHTHALMIC PRN
Status: DISCONTINUED | OUTPATIENT
Start: 2017-06-30 | End: 2017-06-30 | Stop reason: HOSPADM

## 2017-06-30 RX ORDER — CYCLOPENTOLATE HYDROCHLORIDE 10 MG/ML
1 SOLUTION/ DROPS OPHTHALMIC
Status: COMPLETED | OUTPATIENT
Start: 2017-06-30 | End: 2017-06-30

## 2017-06-30 RX ORDER — BALANCED SALT SOLUTION 6.4; .75; .48; .3; 3.9; 1.7 MG/ML; MG/ML; MG/ML; MG/ML; MG/ML; MG/ML
SOLUTION OPHTHALMIC PRN
Status: DISCONTINUED | OUTPATIENT
Start: 2017-06-30 | End: 2017-06-30 | Stop reason: HOSPADM

## 2017-06-30 RX ORDER — LIDOCAINE HYDROCHLORIDE 10 MG/ML
INJECTION, SOLUTION EPIDURAL; INFILTRATION; INTRACAUDAL; PERINEURAL PRN
Status: DISCONTINUED | OUTPATIENT
Start: 2017-06-30 | End: 2017-06-30 | Stop reason: HOSPADM

## 2017-06-30 RX ORDER — SODIUM CHLORIDE, SODIUM LACTATE, POTASSIUM CHLORIDE, CALCIUM CHLORIDE 600; 310; 30; 20 MG/100ML; MG/100ML; MG/100ML; MG/100ML
500 INJECTION, SOLUTION INTRAVENOUS CONTINUOUS
Status: DISCONTINUED | OUTPATIENT
Start: 2017-06-30 | End: 2017-06-30 | Stop reason: HOSPADM

## 2017-06-30 RX ORDER — TROPICAMIDE 10 MG/ML
1 SOLUTION/ DROPS OPHTHALMIC
Status: COMPLETED | OUTPATIENT
Start: 2017-06-30 | End: 2017-06-30

## 2017-06-30 RX ADMIN — CYCLOPENTOLATE HYDROCHLORIDE 1 DROP: 10 SOLUTION/ DROPS OPHTHALMIC at 07:06

## 2017-06-30 RX ADMIN — MIDAZOLAM HYDROCHLORIDE 1 MG: 1 INJECTION, SOLUTION INTRAMUSCULAR; INTRAVENOUS at 07:27

## 2017-06-30 RX ADMIN — CYCLOPENTOLATE HYDROCHLORIDE 1 DROP: 10 SOLUTION/ DROPS OPHTHALMIC at 07:15

## 2017-06-30 RX ADMIN — DEXMEDETOMIDINE HYDROCHLORIDE 8 MCG: 100 INJECTION, SOLUTION INTRAVENOUS at 07:34

## 2017-06-30 RX ADMIN — OFLOXACIN 1 DROP: 3 SOLUTION/ DROPS OPHTHALMIC at 06:57

## 2017-06-30 RX ADMIN — CYCLOPENTOLATE HYDROCHLORIDE 1 DROP: 10 SOLUTION/ DROPS OPHTHALMIC at 06:57

## 2017-06-30 RX ADMIN — TROPICAMIDE 1 DROP: 10 SOLUTION/ DROPS OPHTHALMIC at 07:06

## 2017-06-30 RX ADMIN — SODIUM CHONDROITIN SULFATE / SODIUM HYALURONATE 1 ML: 0.55-0.5 INJECTION INTRAOCULAR at 07:38

## 2017-06-30 RX ADMIN — OFLOXACIN 1 DROP: 3 SOLUTION/ DROPS OPHTHALMIC at 07:15

## 2017-06-30 RX ADMIN — PREDNISOLONE ACETATE 1 DROP: 10 SUSPENSION/ DROPS OPHTHALMIC at 07:43

## 2017-06-30 RX ADMIN — LIDOCAINE HYDROCHLORIDE 0.5 ML: 35 GEL OPHTHALMIC at 07:37

## 2017-06-30 RX ADMIN — PHENYLEPHRINE HYDROCHLORIDE 1 DROP: 2.5 SOLUTION/ DROPS OPHTHALMIC at 06:57

## 2017-06-30 RX ADMIN — TROPICAMIDE 1 DROP: 10 SOLUTION/ DROPS OPHTHALMIC at 07:15

## 2017-06-30 RX ADMIN — PROPARACAINE HYDROCHLORIDE 1 DROP: 5 SOLUTION/ DROPS OPHTHALMIC at 06:58

## 2017-06-30 RX ADMIN — SODIUM CHLORIDE, POTASSIUM CHLORIDE, SODIUM LACTATE AND CALCIUM CHLORIDE 500 ML: 600; 310; 30; 20 INJECTION, SOLUTION INTRAVENOUS at 07:14

## 2017-06-30 RX ADMIN — OFLOXACIN 1 DROP: 3 SOLUTION/ DROPS OPHTHALMIC at 07:43

## 2017-06-30 RX ADMIN — LIDOCAINE HYDROCHLORIDE 1 ML: 10 INJECTION, SOLUTION EPIDURAL; INFILTRATION; INTRACAUDAL; PERINEURAL at 07:38

## 2017-06-30 RX ADMIN — TROPICAMIDE 1 DROP: 10 SOLUTION/ DROPS OPHTHALMIC at 06:57

## 2017-06-30 RX ADMIN — DEXMEDETOMIDINE HYDROCHLORIDE 12 MCG: 100 INJECTION, SOLUTION INTRAVENOUS at 07:31

## 2017-06-30 RX ADMIN — PHENYLEPHRINE HYDROCHLORIDE 1 DROP: 2.5 SOLUTION/ DROPS OPHTHALMIC at 07:06

## 2017-06-30 RX ADMIN — BALANCED SALT SOLUTION 15 ML: 6.4; .75; .48; .3; 3.9; 1.7 SOLUTION OPHTHALMIC at 07:37

## 2017-06-30 RX ADMIN — MIDAZOLAM HYDROCHLORIDE 1 MG: 1 INJECTION, SOLUTION INTRAMUSCULAR; INTRAVENOUS at 07:33

## 2017-06-30 RX ADMIN — OFLOXACIN 1 DROP: 3 SOLUTION/ DROPS OPHTHALMIC at 07:06

## 2017-06-30 RX ADMIN — PHENYLEPHRINE HYDROCHLORIDE 1 DROP: 2.5 SOLUTION/ DROPS OPHTHALMIC at 07:15

## 2017-06-30 RX ADMIN — EPINEPHRINE 500 ML: 1 INJECTION, SOLUTION, CONCENTRATE INTRAVENOUS at 07:37

## 2017-06-30 ASSESSMENT — TONOMETRY
OD_IOP_MMHG: 24
IOP_METHOD: TONOPEN

## 2017-06-30 ASSESSMENT — VISUAL ACUITY
METHOD: SNELLEN - LINEAR
OD_SC: 20/100

## 2017-06-30 ASSESSMENT — EXTERNAL EXAM - RIGHT EYE: OD_EXAM: NORMAL

## 2017-06-30 ASSESSMENT — SLIT LAMP EXAM - LIDS: COMMENTS: NORMAL

## 2017-06-30 NOTE — IP AVS SNAPSHOT
[facility]    [address]    [place] [address]    Phone:  [phone]    Fax:  [phone]                                       After Visit Summary   6/30/2017    Nena Tang    MRN: [id number]           After Visit Summary Signature Page     I have received my discharge instructions, and my questions have been answered. I have discussed any challenges I see with this plan with the nurse or doctor.    ..........................................................................................................................................  Patient/Patient Representative Signature      ..........................................................................................................................................  Patient Representative Print Name and Relationship to Patient    ..................................................               ................................................  Date                                            Time    ..........................................................................................................................................  Reviewed by Signature/Title    ...................................................              ..............................................  Date                                                            Time

## 2017-06-30 NOTE — PROGRESS NOTES
POD#0, status post cataract surgery, right eye    No complaints.  Denies eye pain.    Impression/Plan:  Pseudophakia, OD: POD0, good post-operative appearance. IOP reasonable.    - Levofloxacin 4x daily in the surgical eye for 1 week  - Prednisolone 4x daily in the surgical eye for 1 week, then weekly taper      Eye protection at all times and eye shield at night for 1 week.    Limited activities with no exercise or heavy lifting for 1 week.    Instructed patient to contact us for decreasing vision, eye pain, new floaters or flashes of light or other concerning symptoms.    Written instructions given    Return to clinic 3-4 weeks with refraction and dilation.    Teaching statement:  Complete documentation of historical and exam elements from today's encounter can be found in the full encounter summary report (not reduplicated in this progress note). I personally obtained the chief complaint(s) and history of present illness.  I confirmed and edited as necessary the review of systems, past medical/surgical history, family history, social history, and examination findings as documented by others; and I examined the patient myself. I personally reviewed the relevant tests, images, and reports as documented above.     I formulated and edited as necessary the assessment and plan and discussed the findings and management plan with the patient and family.    Tyra Diaz MD  Comprehensive Ophthalmology & Ocular Pathology  Department of Ophthalmology and Visual Neurosciences  [email or web address]  Pager [phone]

## 2017-06-30 NOTE — ANESTHESIA CARE TRANSFER NOTE
Patient: Nena Tang    Procedure(s):  RIGHT EYE PHACOEMULSIFICATION CLEAR CORNEA WITH STANDARD INTRAOCULAR LENS IMPLANT - Wound Class: I-Clean    Diagnosis: RIGHT EYE CATARACT  Diagnosis Additional Information: No value filed.    Anesthesia Type:   MAC     Note:  Airway :Room Air  Patient transferred to:PACU  Comments: VSS      Vitals: (Last set prior to Anesthesia Care Transfer)    CRNA VITALS  6/30/2017 0718 - 6/30/2017 0752      6/30/2017             Pulse: 83    Ht Rate: 82    SpO2: 97 %    Resp Rate (set): 10                Electronically Signed By: ART Byers CRNA  June 30, 2017  7:52 AM

## 2017-06-30 NOTE — ANESTHESIA POSTPROCEDURE EVALUATION
Patient: Nena Tang    Procedure(s):  RIGHT EYE PHACOEMULSIFICATION CLEAR CORNEA WITH STANDARD INTRAOCULAR LENS IMPLANT - Wound Class: I-Clean    Diagnosis:RIGHT EYE CATARACT  Diagnosis Additional Information: No value filed.    Anesthesia Type:  MAC    Note:  Anesthesia Post Evaluation    Patient location during evaluation: PACU  Patient participation: Able to fully participate in evaluation  Level of consciousness: awake  Pain management: adequate  Airway patency: patent  Cardiovascular status: acceptable  Respiratory status: acceptable  Hydration status: acceptable  PONV: none     Anesthetic complications: None          Last vitals:  Vitals:    06/30/17 0703 06/30/17 0753 06/30/17 0811   BP: 125/68 108/62 108/69   Pulse:   83   Resp: 16  16   Temp: 36.5  C (97.7  F)     SpO2: 95% 93% 96%         Electronically Signed By: Albino Tiwari MD  June 30, 2017  2:19 PM

## 2017-06-30 NOTE — OR NURSING
Patient came out of operating room alert, vitals are stable, blood sugar was 150mg/dl,had some water but said she will prefer to eat when she gets home, discharge medication and instruction given to patient's PCA, dr proctor saw her and said she should wait for her at [facility] that she will see them in about an hour.

## 2017-06-30 NOTE — PROCEDURES
Ophthalmology Post-op Procedure Note    Surgical Service:    Ophthalmology & Visual Sciences  Date Performed:      June 30, 2017  Location: [facility]      Pre-operative Diagnosis: Visually significant cataract, right eye  Post-operative Diagnosis:  Pseudophakia, right eye  Operative Procedure:  Phacoemulsification with intraocular lens implantation, right eye      Surgeon(s):  Fellow/Staff Surgeon:       Tyra Diaz MD  Resident Surgeon:            Honorio Tate MD    Anesthesia:   Topical/MAC  Findings:  No unusual findings   Blood Loss:    Minimal  Implants:  ZCB00 21.5 intraocular lens  Specimens:  None     Complications:  The patient did not experience any complications.   Condition: Stable    Operative Report Completion:    Description of Operation/Procedure:  After appropriate informed consent was obtained, the patient was brought to the operating room. The appropriate cardiac and blood pressure monitors were placed. A final pause occurred just before the start of the procedure during which the entire procedure team actively confirmed the correct patient, procedure, site, special equipment and special requirements. The patient was prepped and draped in the usual sterile fashion using 5% povidone/iodine.     An eyelid speculum was placed to open the eyelids. A paracentesis port was placed approximately sixty degrees to the left of the planned temporal incision location using the sideport blade. Approximately 0.8 cc of 1% nonpreserved lidocaine was placed into the anterior chamber. The anterior chamber was filled with dispersive viscoelastic. A clear corneal temporal incision was made with a metal 2.6 mm keratome. A round continuous tear capsulorhexis was initiated with a cystotome and completed with the Utrata forceps. Balanced salt solution on a cannula was used to perform hydrodissection. The nucleus was removed using phacoemulsification with a chop technique. The remaining  cortical material was removed using the irrigation/aspiration handpiece. The capsular bag was filled with dispersive viscoelastic. A lens of the  model and power listed above was placed into the capsular bag using the cartridge injection system. The remaining viscoelastic was removed using the irrigation aspiration handpiece. The paracentesis and temporal wounds were hydrated with balanced salt solution. At the conclusion of the case, the wounds were felt to be watertight, the pupil was round, the lens was centered, and the anterior chamber was deep. A few drops of antibiotic and prednisolone were given to the operative eye. The eyelid speculum was removed. A shield was placed over the operative eye.      Attending Attestation:  I was present for and performed the entire procedure.  Tyra Diaz MD

## 2017-06-30 NOTE — ANESTHESIA PREPROCEDURE EVALUATION
Procedure: Procedure(s):  PHACOEMULSIFICATION CLEAR CORNEA WITH STANDARD INTRAOCULAR LENS IMPLANT  Preop diagnosis: RIGHT EYE CATARACT  Allergies   Allergen Reactions     Imidazole Antifungals Hives     Tolerates diflucan     Ketoprofen Itching     Pruritis to topical     Lisinopril Hives     Metformin Other (See Comments)     Patient hospitalized for lactic acidosis - admitting provider suspectd caused by metformin     Metronidazole Hives     Posaconazole Hives     Tolerates diflucan     Patient Active Problem List   Diagnosis     Osteopenia     Vitamin B12 deficiency without anemia     Hyperlipidemia LDL goal <100     Moderate major depression (H)     Rotator cuff syndrome     Type 2 diabetes mellitus with mild nonproliferative retinopathy (H)     Illiterate     Irritable bowel syndrome     overweight - BMI >35     Takotsubo cardiomyopathy     CAD (coronary artery disease)     Restless legs syndrome (RLS)     CINDY (obstructive sleep apnea)- mild AHI 10.3     Verbal auditory hallucination     Chronic low back pain     Schizoaffective disorder, depressive type (H)     Migraine headache     HTN, goal below 140/90     Health Care Home     Lumbago     Cervicalgia     Cocaine abuse, episodic     Suicidal ideation     Esophageal reflux     Mild nonproliferative diabetic retinopathy (H)     Tardive dyskinesia     Alcohol use     Left cataract     Falls frequently     History of uterine cancer     Depression     Psychophysiological insomnia     Dysuria     Asymptomatic postmenopausal status     Past Medical History:   Diagnosis Date     CAD (coronary artery disease)     5/2014 cath, nonbostructive stenosis to LAD, RCA.     Chronic low back pain 1/22/2013     Cocaine abuse, in remission      Fecal urgency 3/8/2012     History of heroin abuse      Hyperlipidemia LDL goal <100 10/31/2010     Hypertension 7/29/2013     Illiterate 8/30/2011     Irritable bowel syndrome      Left cataract      Migraine 4/19/2012     Migraine  headache 4/22/2013     Moderate major depression (H) 6/8/2011     Noncompliance with medication regimen 6/8/2011     Obesity      CINDY (obstructive sleep apnea) 3/8/2012    uses CPAP     Osteopenia 10/7/2009     Schizoaffective disorder, depressive type (H) 2/25/2013     Suicidal intent 10/2/2013     Takotsubo cardiomyopathy      Type 2 diabetes mellitus (H) 8/30/2011     Uterine cancer (H) 1983     Verbal auditory hallucination 10/4/2012     Past Surgical History:   Procedure Laterality Date     C OOPHORECTORMY FOR RAHEL, W/BX  1983    UTERINE     COLONOSCOPY N/A 3/16/2017    Procedure: COLONOSCOPY;  Surgeon: Traci Gonzalez MD;  Location:  GI     Coronary CTA  5/21/2014     HYSTERECTOMY  1983    uterine cancer yearly pap's per provider.     LAPAROSCOPIC CHOLECYSTECTOMY  2008     PHACOEMULSIFICATION CLEAR CORNEA WITH STANDARD INTRAOCULAR LENS IMPLANT Left 5/5/2017    Procedure: PHACOEMULSIFICATION CLEAR CORNEA WITH STANDARD INTRAOCULAR LENS IMPLANT;  LEFT EYE PHACOEMULSIFICATION CLEAR CORNEA WITH STANDARD INTRAOCULAR LENS IMPLANT ;  Surgeon: Tyra Diaz MD;  Location:  [facility]     RELEASE TRIGGER FINGER  10/11/2012    Left thumb. Procedure: RELEASE TRIGGER FINGER;  LEFT THUMB TRIGGER RELEASE;  Surgeon: Tay Langley MD;  Location:  [facility]     RELEASE TRIGGER FINGER Right 12/26/2016    Procedure: RELEASE TRIGGER FINGER;  Surgeon: Albino Castañeda MD;  Location:  OR       No current facility-administered medications on file prior to encounter.   Current Outpatient Prescriptions on File Prior to Encounter:  hydrocortisone 1 % ointment Apply sparingly to affected area three times daily for 14 days.   vitamin D3 (CHOLECALCIFEROL) 75148 UNITS capsule TAKE 1 CAPSULE (50,000 UNITS) BY MOUTH ONCE A WEEK   DOK PLUS 50-8.6 MG per tablet TAKE 1 TABLET BY MOUTH TWICE A DAY AS NEEDED FOR CONSTIPATION   losartan (COZAAR) 100 MG tablet TAKE 1 TABLET (100MG) BY MOUTH DAILY   atorvastatin (LIPITOR) 80 MG  tablet TAKE 1 TABLET (80MG) BY MOUTH DAILY   metoprolol (TOPROL-XL) 100 MG 24 hr tablet TAKE 1 TABLET (100MG) BY MOUTH DAILY   gabapentin (NEURONTIN) 300 MG capsule TAKE 2 CAPSULES (600MG) BY MOUTH THREE TIMES A DAY   TRULICITY 1.5 MG/0.5ML pen INJECT 1.5MG SUBCUTANEOUSLY EVERY SEVEN DAYS   levofloxacin (QUIXIN) 0.5 % ophthalmic solution 1 drop in surgical eye as directed - 4x daily for 1 week, then stop   prednisoLONE acetate (PRED FORTE) 1 % ophthalmic susp 1 drop in surgical eye as directed, 4x daily after surgery for 1 week, 3x daily for 1 week, 2x daily for 1 week, daily for 1 week, then stop   polyethylene glycol (MIRALAX/GLYCOLAX) powder TAKE 17 GRAMS (1 CAPFUL) BY MOUTH DAILY   NOVOLOG FLEXPEN 100 UNIT/ML soln INJECT 20 UNITS SUBCUTANEOUSLY THREE TIMES A DAY (WITH MEALS), INJECT AN EXTRA 7 UNITS ONCE DAILY FOR BLOOD SUGAR OVER 500   ULTICARE MINI 31G X 6 MM insulin pen needle USE 4 DAILY OR AS DIRECTED   aspirin (ASPIRIN LOW DOSE) 81 MG EC tablet Take 1 tablet (81 mg) by mouth daily   acetaminophen (TYLENOL) 500 MG tablet Take 2 tablets (1,000 mg) by mouth every 6 hours as needed for pain   ibuprofen (ADVIL,MOTRIN) 600 MG tablet Take 1 tablet (600 mg) by mouth every 4 hours as needed for moderate pain   melatonin 5 MG CAPS Take 1 capsule by mouth daily At dinnertime   glucose 4 G CHEW Take 2 every 15 minutes for blood sugar <70mg/dL. Recheck blood sugar every 15 minutes until above 70mg/dL, then eat a substantial meal.   order for DME Hold Novolog if meals are refused.   blood glucose monitoring (ONE TOUCH DELICA) lancets Use to test blood sugar 2 times daily or as directed.  Ok to substitute alternative if insurance prefers.   citalopram (CELEXA) 20 MG tablet Take 1 tablet (20 mg) by mouth daily   haloperidol (HALDOL) 5 MG tablet Take 1 tablet (5 mg) by mouth At Bedtime   benztropine (COGENTIN) 1 MG tablet Take 1 tablet (1 mg) by mouth 2 times daily   order for DME Equipment being ordered: Rolling walker      LMP 01/06/2015    Lab Results   Component Value Date    WBC 7.1 01/24/2017     Lab Results   Component Value Date    RBC 3.49 01/24/2017     Lab Results   Component Value Date    HGB 10.7 01/24/2017     Lab Results   Component Value Date    HCT 33.0 01/24/2017     Lab Results   Component Value Date    MCV 95 01/24/2017     Lab Results   Component Value Date    MCH 30.7 01/24/2017     Lab Results   Component Value Date    MCHC 32.4 01/24/2017     Lab Results   Component Value Date    RDW 13.2 01/24/2017     Lab Results   Component Value Date     01/24/2017     Lab Results   Component Value Date    INR  11/11/2016     Unsatisfactory specimen - tube underfilled  SPOKE WITH DR VARELA [id number], LY         Last Basic Metabolic Panel:  Lab Results   Component Value Date     06/27/2017      Lab Results   Component Value Date    POTASSIUM 4.3 06/27/2017     Lab Results   Component Value Date    CHLORIDE 106 06/27/2017     Lab Results   Component Value Date    ALLEN 9.0 06/27/2017     Lab Results   Component Value Date    CO2 24 06/27/2017     Lab Results   Component Value Date    BUN 18 06/27/2017     Lab Results   Component Value Date    CR 0.78 06/27/2017     Lab Results   Component Value Date     06/27/2017     Echo 5/2014  Interpretation Summary   Global and regional left ventricular function is normal with an EF of 55-60%.   Global right ventricular function is normal. The inferior vena cava  is   normal. No pericardial effusion is present.   PatientHeight: 61 in   PatientWeight: 185 lbs   SystolicPressure: 129 mmHg   DiastolicPressure: 84 mmHg   BSA 1.8 m^2           Procedure   Echocardiogram with two-dimensional, color and spectral Doppler performed.       Left Ventricle   Global and regional left ventricular function is normal with an EF of 55-60%.       Right Ventricle   The right ventricle is normal size.   Global right ventricular function is normal.       Atria   Mild to moderate left  atrial enlargement is present.       Mitral Valve   The mitral valve is normal.   Trace to mild mitral insufficiency is present.       Aortic Valve   Aortic valve is normal in structure and function.       Tricuspid Valve   The tricuspid valve is normal.   Trace tricuspid insufficiency is present.       Pulmonic Valve   The pulmonic valve is normal.       Vessels   The inferior vena cava  is normal.       Pericardium   No pericardial effusion is present.     EKG  24-JAN-2017 19:53:05 [facility] ROUTINE RECORD  Sinus rhythm  Left axis deviation  Abnormal ECG  When compared with ECG of 22-JAN-2017 19:11,  No significant change was found    GATED MYOCARDIAL PERFUSION SCINTIGRAPHY WITH INTRAVENOUS PHARMACOLOGIC  VASODILATATION LEXISCAN -ONE DAY STUDY      1/6/2015 3:34 PM  ERIK BURNHAM  54 years  Female  1960.     Indication/Clinical History: Coronary artery disease     Impression  1.  Myocardial perfusion imaging using single isotope technique  demonstrated normal myocardial perfusion.   2. Gated images demonstrated normal wall motion and resting images.   The left ventricular systolic function is normal at rest with a  calculated ejection fraction of 56%.  Anesthesia Evaluation     . Pt has had prior anesthetic.     No history of anesthetic complications          ROS/MED HX    ENT/Pulmonary:     (+)sleep apnea, uses CPAP 5 cmH2O , . .   (-) recent URI   Neurologic: Comment: Tardive dyskinesia  Restless leg syndrome      Cardiovascular: Comment: Cath 5/2014 nonobstructive disease LAD, RCA    (+) Dyslipidemia, hypertension--CAD, -past MI,-. : . CHF etiology: Takatsubo 2009. Cath 2014 showed non-obstructive CAD.  . . :. .       METS/Exercise Tolerance:     Hematologic:         Musculoskeletal: Comment: Neck and back pain  osteopenia        GI/Hepatic:     (+) GERD Other GI/Hepatic IBS      Renal/Genitourinary:         Endo:     (+) type II DM Diabetic complications: retinopathy, Obesity, .       Psychiatric: Comment: Insomnia  schizzoaffective disorder  Hx of polysubstance abuse - cocain, heroin    (+) psychiatric history depression      Infectious Disease:         Malignancy:   (+) Malignancy History of Other  Other CA uterine cancer status post         Other:                     Physical Exam  Normal systems: cardiovascular, pulmonary and dental    Airway   Mallampati: III  TM distance: >3 FB  Neck ROM: limited    Dental     Cardiovascular   Rhythm and rate: regular and normal      Pulmonary    breath sounds clear to auscultation                    Anesthesia Plan      History & Physical Review  History and physical reviewed and following examination; no interval change.    ASA Status:  3 .    NPO Status:  > 8 hours    Plan for MAC Reason for MAC:  Procedure to face, neck, head or breast  PONV prophylaxis:  Ondansetron (or other 5HT-3)  S/p cataract surgery 5/5/2017      Postoperative Care  Postoperative pain management:  IV analgesics.      Consents  Anesthetic plan, risks, benefits and alternatives discussed with:  Patient..                          .

## 2017-06-30 NOTE — DISCHARGE INSTRUCTIONS
[facility] Discharge  Instructions  Anesthesia ([facility])   Adult Discharge Instructions    For 24 hours after surgery    1. Get plenty of rest.  Make arrangements to have a responsible adult stay with you for at least 6 hours after you leave the hospital.  2. Do not drive or use heavy equipment for 24 hours.    3. Do not drink alcohol for 24 hours.  4. Do not sign legal documents or make important decisions for 24 hours.  5. Avoid strenuous or risky activities. You may feel lightheaded.  If so, sit for a few minutes before standing.  Have someone help you get up.   6. Conscious sedation patients may resume a regular diet..  7. Any questions of medical nature, call your physician.    Dr. Tyra Diaz  [facility]  [phone]  Post Operative Cataract Instructions        If you have a gauze eye patch on, please do not remove it until it is removed by your physician at your first post-operative visit.  You will start your eye drops the next day.    OR      If you only have a clear eye shield on, you may remove the eye shield on arrival home and begin eye drops today as directed by Dr. Diaz.      Wear the clear eye shield when sleeping for protection for 5 days.      Do not rub the operated eye.      Light sensitivity may be noticed. Sunglasses may be worn for comfort.      Some discomfort and irritation may be noticed. Acetaminophen (Tylenol) or Ibuprofen (Advil) may be taken for discomfort. If pain persists please call Dr. Diaz's office.      Keep the operated eye dry. You may wash your hair, bathe or shower, but keep the operated eye closed while doing so.       If you take glaucoma medications, bring them with you to the clinic on your first post operative visit.      Bring your prescribed eye drops with you to your scheduled post-operative appointment.      Use medication exactly as prescribed by your doctor. You may restart your regular home medications.        Call Dr. Diaz's office at [phone] if any of the following should occur:    - Any sudden vision changes, including decreased vision  - Nausea or severe headache  - Increase in pain not controlled  - Signs of infection (pus, increasing redness or tenderness)  - Severe sensitivity to light

## 2017-06-30 NOTE — IP AVS SNAPSHOT
MRN:[id number]                      After Visit Summary   6/30/2017    Nena Tang    MRN: [id number]           Thank you!     Thank you for choosing [facility] for your care. Our goal is always to provide you with excellent care. Hearing back from our patients is one way we can continue to improve our services. Please take a few minutes to complete the written survey that you may receive in the mail after you visit with us. Thank you!        Patient Information     Date Of Birth          1960        About your hospital stay     You were admitted on:  June 30, 2017 You last received care in the:  [facility]    You were discharged on:  June 30, 2017       Who to Call     For medical emergencies, please call 911.  For non-urgent questions about your medical care, please call your primary care provider or clinic, [phone]  For questions related to your surgery, please call your surgery clinic        Attending Provider     Provider Specialty    Tyra Diaz MD Ophthalmology       Primary Care Provider Office Phone # Fax #    Rowena Delaneysharlene APRGARRETT [phone]      After Care Instructions     Eye drops as prescribed by physician.  Start drops today unless told otherwise by the physician           May use Tylenol or Advil for pain as directed by the physician           Notify Physician if you have severe headache or nausea           Remove patch per physician instruction           Return to clinic as instructed by physician           Wear eye shield or patch as directed                 Your next 10 appointments already scheduled     Jul 06, 2017  8:30 AM CDT   Return Sleep Patient with William Eng MD   [facility] ([facility])    [address] [address] [address]   [phone]            Aug 02, 2017  2:00 PM CDT   Post-Op with Tyra Diaz MD   Eye Clinic ([facility])    [address]  [facility] [address]   [phone]            Nov 15, 2017 10:15 AM CST   RETURN RETINA with Tiburcio Chacon MD   Eye Clinic ([facility])    [address]  [facility] [address]   [phone]              Further instructions from your care team       [facility] Discharge  Instructions  Anesthesia (Eye Care Center)   Adult Discharge Instructions    For 24 hours after surgery    1. Get plenty of rest.  Make arrangements to have a responsible adult stay with you for at least 6 hours after you leave the hospital.  2. Do not drive or use heavy equipment for 24 hours.    3. Do not drink alcohol for 24 hours.  4. Do not sign legal documents or make important decisions for 24 hours.  5. Avoid strenuous or risky activities. You may feel lightheaded.  If so, sit for a few minutes before standing.  Have someone help you get up.   6. Conscious sedation patients may resume a regular diet..  7. Any questions of medical nature, call your physician.    Dr. Tyra Diaz  [facility]  [phone]  Post Operative Cataract Instructions        If you have a gauze eye patch on, please do not remove it until it is removed by your physician at your first post-operative visit.  You will start your eye drops the next day.    OR      If you only have a clear eye shield on, you may remove the eye shield on arrival home and begin eye drops today as directed by Dr. Diaz.      Wear the clear eye shield when sleeping for protection for 5 days.      Do not rub the operated eye.      Light sensitivity may be noticed. Sunglasses may be worn for comfort.      Some discomfort and irritation may be noticed. Acetaminophen (Tylenol) or Ibuprofen (Advil) may be taken for discomfort. If pain persists please call Dr. Diaz's office.      Keep the operated eye  "dry. You may wash your hair, bathe or shower, but keep the operated eye closed while doing so.       If you take glaucoma medications, bring them with you to the clinic on your first post operative visit.      Bring your prescribed eye drops with you to your scheduled post-operative appointment.      Use medication exactly as prescribed by your doctor. You may restart your regular home medications.       Call Dr. Diaz's office at [phone] if any of the following should occur:    - Any sudden vision changes, including decreased vision  - Nausea or severe headache  - Increase in pain not controlled  - Signs of infection (pus, increasing redness or tenderness)  - Severe sensitivity to light    Pending Results     Date and Time Order Name Status Description    6/28/2017 1025 DX HIP/PELVIS/SPINE In process             Admission Information     Date & Time Provider Department Dept. Phone    6/30/2017 Tyra Diaz MD [facility] [phone]      Your Vitals Were     Blood Pressure Temperature Respirations Height Last Period Pulse Oximetry    108/62 97.7  F (36.5  C) (Temporal) 16 1.549 m (5' 1\") 01/06/2015 93%      MyChart Information     [employer] gives you secure access to your electronic health record. If you see a primary care provider, you can also send messages to your care team and make appointments. If you have questions, please call your primary care clinic.  If you do not have a primary care provider, please call [phone] and they will assist you.        Care EveryWhere ID     This is your Care EveryWhere ID. This could be used by other organizations to access your [facility] medical records  [id number]        Equal Access to Services     [facility] HEMA : Azul Rios, wapablo napier, qaybta kaallorena nichols. So [facility] [phone].    ATENCIÓN: Si habla español, tiene a trinh disposición servicios gratuitos de asistencia " lingüísticaRichar Mtz al [phone].    We comply with applicable federal civil rights laws and [place] laws. We do not discriminate on the basis of race, color, national origin, age, disability sex, sexual orientation or gender identity.               Review of your medicines      UNREVIEWED medicines. Ask your doctor about these medicines        Dose / Directions    acetaminophen 500 MG tablet   Commonly known as:  TYLENOL   Used for:  Closed fracture of one rib of right side, initial encounter        Dose:  1000 mg   Take 2 tablets (1,000 mg) by mouth every 6 hours as needed for pain   Quantity:  100 tablet   Refills:  0       aspirin 81 MG EC tablet   Commonly known as:  ASPIRIN LOW DOSE   Used for:  Coronary artery disease involving native heart with angina pectoris, unspecified vessel or lesion type (H)        Dose:  81 mg   Take 1 tablet (81 mg) by mouth daily   Quantity:  100 tablet   Refills:  4       atorvastatin 80 MG tablet   Commonly known as:  LIPITOR   Used for:  Hyperlipidemia LDL goal <100        TAKE 1 TABLET (80MG) BY MOUTH DAILY   Quantity:  60 tablet   Refills:  1       benztropine 1 MG tablet   Commonly known as:  COGENTIN   Used for:  Tardive dyskinesia        Dose:  1 mg   Take 1 tablet (1 mg) by mouth 2 times daily   Quantity:  60 tablet   Refills:  3       citalopram 20 MG tablet   Commonly known as:  celeXA   Used for:  Schizoaffective disorder, depressive type (H)        Dose:  20 mg   Take 1 tablet (20 mg) by mouth daily   Quantity:  30 tablet   Refills:  2       DOK PLUS 8.6-50 MG per tablet   Used for:  Constipation, unspecified constipation type   Generic drug:  senna-docusate        TAKE 1 TABLET BY MOUTH TWICE A DAY AS NEEDED FOR CONSTIPATION   Quantity:  100 tablet   Refills:  3       gabapentin 300 MG capsule   Commonly known as:  NEURONTIN   Used for:  Type 2 diabetes mellitus with diabetic neuropathy, with long-term current use of insulin (H)        TAKE 2 CAPSULES (600MG) BY  MOUTH THREE TIMES A DAY   Quantity:  168 capsule   Refills:  3       glucose 4 G Chew chewable tablet   Used for:  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, with long-term current use of insulin, unspecified laterality (H)        Take 2 every 15 minutes for blood sugar <70mg/dL. Recheck blood sugar every 15 minutes until above 70mg/dL, then eat a substantial meal.   Quantity:  20 tablet   Refills:  1       haloperidol 5 MG tablet   Commonly known as:  HALDOL   Used for:  Schizoaffective disorder, depressive type (H)        Dose:  5 mg   Take 1 tablet (5 mg) by mouth At Bedtime   Quantity:  30 tablet   Refills:  2       hydrocortisone 1 % ointment   Used for:  Bug bites, initial encounter        Apply sparingly to affected area three times daily for 14 days.   Quantity:  30 g   Refills:  0       ibuprofen 600 MG tablet   Commonly known as:  ADVIL/MOTRIN   Used for:  Closed fracture of one rib of right side, initial encounter        Dose:  600 mg   Take 1 tablet (600 mg) by mouth every 4 hours as needed for moderate pain   Quantity:  60 tablet   Refills:  0       LANTUS SOLOSTAR 100 UNIT/ML injection   Used for:  Type 2 diabetes mellitus with both eyes affected by mild nonproliferative retinopathy without macular edema, with long-term current use of insulin (H)   Generic drug:  insulin glargine        INJECT 45 UNITS SUBCUTANEOUSLY EVERY NIGHT AT BEDTIME   Quantity:  15 mL   Refills:  3       * levofloxacin 0.5 % ophthalmic solution   Commonly known as:  QUIXIN   Used for:  Cataract, left        1 drop in surgical eye as directed - 4x daily for 1 week, then stop   Quantity:  2 Bottle   Refills:  1       * levofloxacin 0.5 % ophthalmic solution   Commonly known as:  QUIXIN   Used for:  Nuclear cataract of right eye        1 drop in surgical eye as directed - 4x daily for 1 week, then stop   Quantity:  2 Bottle   Refills:  1       losartan 100 MG tablet   Commonly known as:   COZAAR   Used for:  Coronary artery disease involving native heart with angina pectoris, unspecified vessel or lesion type (H)        TAKE 1 TABLET (100MG) BY MOUTH DAILY   Quantity:  90 tablet   Refills:  1       melatonin 5 MG Caps   Used for:  Insomnia, unspecified type        Dose:  1 capsule   Take 1 capsule by mouth daily At dinnertime   Quantity:  90 capsule   Refills:  1       metoprolol 100 MG 24 hr tablet   Commonly known as:  TOPROL-XL   Used for:  Coronary artery disease involving native heart with angina pectoris, unspecified vessel or lesion type (H)        TAKE 1 TABLET (100MG) BY MOUTH DAILY   Quantity:  60 tablet   Refills:  1       NovoLOG FLEXPEN 100 UNIT/ML injection   Used for:  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, with long-term current use of insulin, unspecified laterality (H)   Generic drug:  insulin aspart        INJECT 20 UNITS SUBCUTANEOUSLY THREE TIMES A DAY (WITH MEALS), INJECT AN EXTRA 7 UNITS ONCE DAILY FOR BLOOD SUGAR OVER 500   Quantity:  15 mL   Refills:  3       polyethylene glycol powder   Commonly known as:  MIRALAX/GLYCOLAX   Used for:  Constipation, unspecified constipation type        TAKE 17 GRAMS (1 CAPFUL) BY MOUTH DAILY   Quantity:  527 g   Refills:  3       prednisoLONE acetate 1 % ophthalmic susp   Commonly known as:  PRED FORTE   Used for:  Cataract, left        1 drop in surgical eye as directed, 4x daily after surgery for 1 week, 3x daily for 1 week, 2x daily for 1 week, daily for 1 week, then stop   Quantity:  2 Bottle   Refills:  1       prednisoLONE acetate 1 % ophthalmic susp   Commonly known as:  PRED FORTE   Used for:  Nuclear cataract of right eye        1 drop in surgical eye as directed, 4x daily after surgery for 1 week, 3x daily for 1 week, 2x daily for 1 week, daily for 1 week, then stop   Quantity:  1 Bottle   Refills:  1       ranitidine 300 MG tablet   Commonly known as:  ZANTAC   Used for:   Gastroesophageal reflux disease without esophagitis        Dose:  300 mg   Take 1 tablet (300 mg) by mouth At Bedtime   Quantity:  30 tablet   Refills:  1       TRULICITY 1.5 MG/0.5ML pen   Used for:  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, with long-term current use of insulin, unspecified laterality (H)   Generic drug:  dulaglutide        INJECT 1.5MG SUBCUTANEOUSLY EVERY SEVEN DAYS   Quantity:  2 mL   Refills:  11       vitamin D3 00141 UNITS capsule   Commonly known as:  CHOLECALCIFEROL   Used for:  Vitamin D deficiency        TAKE 1 CAPSULE (50,000 UNITS) BY MOUTH ONCE A WEEK   Quantity:  4 capsule   Refills:  3       * Notice:  This list has 2 medication(s) that are the same as other medications prescribed for you. Read the directions carefully, and ask your doctor or other care provider to review them with you.      CONTINUE these medicines which have NOT CHANGED        Dose / Directions    blood glucose monitoring lancets   Used for:  Type 2 diabetes mellitus with diabetic neuropathy, with long-term current use of insulin (H)        Use to test blood sugar 2 times daily or as directed.  Ok to substitute alternative if insurance prefers.   Quantity:  1 Box   Refills:  prn       ONE TOUCH ULTRA test strip   Used for:  Type 2 diabetes mellitus with diabetic neuropathy, with long-term current use of insulin (H)   Generic drug:  blood glucose monitoring        TEST TWICE DAILY AS DIRECTED   Quantity:  150 strip   Refills:  8       * order for DME   Used for:  Falls frequently        Equipment being ordered: Rolling walker   Quantity:  1 Device   Refills:  0       * order for DME   Used for:  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, with long-term current use of insulin, unspecified laterality (H)        Hold Novolog if meals are refused.   Quantity:  1 each   Refills:  0       ULTICARE MINI 31G X 6 MM   Used for:  Type 2  diabetes mellitus with mild nonproliferative retinopathy without macular edema, with long-term current use of insulin, unspecified laterality (H)   Generic drug:  insulin pen needle        USE 4 DAILY OR AS DIRECTED   Quantity:  100 each   Refills:  11       * Notice:  This list has 2 medication(s) that are the same as other medications prescribed for you. Read the directions carefully, and ask your doctor or other care provider to review them with you.             Protect others around you: Learn how to safely use, store and throw away your medicines at www.disposemymeds.org.             Medication List: This is a list of all your medications and when to take them. Check marks below indicate your daily home schedule. Keep this list as a reference.      Medications           Morning Afternoon Evening Bedtime As Needed    acetaminophen 500 MG tablet   Commonly known as:  TYLENOL   Take 2 tablets (1,000 mg) by mouth every 6 hours as needed for pain                                aspirin 81 MG EC tablet   Commonly known as:  ASPIRIN LOW DOSE   Take 1 tablet (81 mg) by mouth daily                                atorvastatin 80 MG tablet   Commonly known as:  LIPITOR   TAKE 1 TABLET (80MG) BY MOUTH DAILY                                benztropine 1 MG tablet   Commonly known as:  COGENTIN   Take 1 tablet (1 mg) by mouth 2 times daily                                blood glucose monitoring lancets   Use to test blood sugar 2 times daily or as directed.  Ok to substitute alternative if insurance prefers.                                citalopram 20 MG tablet   Commonly known as:  celeXA   Take 1 tablet (20 mg) by mouth daily                                DOK PLUS 8.6-50 MG per tablet   TAKE 1 TABLET BY MOUTH TWICE A DAY AS NEEDED FOR CONSTIPATION   Generic drug:  senna-docusate                                gabapentin 300 MG capsule   Commonly known as:  NEURONTIN   TAKE 2 CAPSULES (600MG) BY MOUTH THREE TIMES A DAY                                 glucose 4 G Chew chewable tablet   Take 2 every 15 minutes for blood sugar <70mg/dL. Recheck blood sugar every 15 minutes until above 70mg/dL, then eat a substantial meal.                                haloperidol 5 MG tablet   Commonly known as:  HALDOL   Take 1 tablet (5 mg) by mouth At Bedtime                                hydrocortisone 1 % ointment   Apply sparingly to affected area three times daily for 14 days.                                ibuprofen 600 MG tablet   Commonly known as:  ADVIL/MOTRIN   Take 1 tablet (600 mg) by mouth every 4 hours as needed for moderate pain                                LANTUS SOLOSTAR 100 UNIT/ML injection   INJECT 45 UNITS SUBCUTANEOUSLY EVERY NIGHT AT BEDTIME   Generic drug:  insulin glargine                                * levofloxacin 0.5 % ophthalmic solution   Commonly known as:  QUIXIN   1 drop in surgical eye as directed - 4x daily for 1 week, then stop                                * levofloxacin 0.5 % ophthalmic solution   Commonly known as:  QUIXIN   1 drop in surgical eye as directed - 4x daily for 1 week, then stop                                losartan 100 MG tablet   Commonly known as:  COZAAR   TAKE 1 TABLET (100MG) BY MOUTH DAILY                                melatonin 5 MG Caps   Take 1 capsule by mouth daily At dinnertime                                metoprolol 100 MG 24 hr tablet   Commonly known as:  TOPROL-XL   TAKE 1 TABLET (100MG) BY MOUTH DAILY                                NovoLOG FLEXPEN 100 UNIT/ML injection   INJECT 20 UNITS SUBCUTANEOUSLY THREE TIMES A DAY (WITH MEALS), INJECT AN EXTRA 7 UNITS ONCE DAILY FOR BLOOD SUGAR OVER 500   Generic drug:  insulin aspart                                ONE TOUCH ULTRA test strip   TEST TWICE DAILY AS DIRECTED   Generic drug:  blood glucose monitoring                                * order for DME   Equipment being ordered: Rolling walker                                *  order for DME   Hold Novolog if meals are refused.                                polyethylene glycol powder   Commonly known as:  MIRALAX/GLYCOLAX   TAKE 17 GRAMS (1 CAPFUL) BY MOUTH DAILY                                prednisoLONE acetate 1 % ophthalmic susp   Commonly known as:  PRED FORTE   1 drop in surgical eye as directed, 4x daily after surgery for 1 week, 3x daily for 1 week, 2x daily for 1 week, daily for 1 week, then stop   Last time this was given:  1 drop on 6/30/2017  7:43 AM                                prednisoLONE acetate 1 % ophthalmic susp   Commonly known as:  PRED FORTE   1 drop in surgical eye as directed, 4x daily after surgery for 1 week, 3x daily for 1 week, 2x daily for 1 week, daily for 1 week, then stop   Last time this was given:  1 drop on 6/30/2017  7:43 AM                                ranitidine 300 MG tablet   Commonly known as:  ZANTAC   Take 1 tablet (300 mg) by mouth At Bedtime                                TRULICITY 1.5 MG/0.5ML pen   INJECT 1.5MG SUBCUTANEOUSLY EVERY SEVEN DAYS   Generic drug:  dulaglutide                                ULTICARE MINI 31G X 6 MM   USE 4 DAILY OR AS DIRECTED   Generic drug:  insulin pen needle                                vitamin D3 39320 UNITS capsule   Commonly known as:  CHOLECALCIFEROL   TAKE 1 CAPSULE (50,000 UNITS) BY MOUTH ONCE A WEEK                                * Notice:  This list has 4 medication(s) that are the same as other medications prescribed for you. Read the directions carefully, and ask your doctor or other care provider to review them with you.

## 2017-06-30 NOTE — MR AVS SNAPSHOT
After Visit Summary   6/30/2017    Nena Tang    MRN: [id number]           Patient Information     Date Of Birth          1960        Visit Information        Provider Department      6/30/2017 12:11 PM Tyra Diaz MD [facility]        Today's Diagnoses     Pseudophakia, right eye    -  1       Follow-ups after your visit        Follow-up notes from your care team     Return in about 3 weeks (around 7/21/2017) for refraction and dilation.      Your next 10 appointments already scheduled     Jul 06, 2017  8:30 AM CDT   Return Sleep Patient with William Eng MD   [facility] ([facility])    [address]  [facility] [id number] [phone]            Aug 02, 2017  2:00 PM CDT   Post-Op with Tyra Diaz MD   Eye Clinic ([facility])    [address]  [address]  [facility] [address] [phone]            Nov 15, 2017 10:15 AM CST   RETURN RETINA with Tiburcio Chacon MD   Eye Clinic ([facility])    [address]  [address]  [facility] [address] [phone]              Who to contact     Please call your clinic at [phone] to:    Ask questions about your health    Make or cancel appointments    Discuss your medicines    Learn about your test results    Speak to your doctor   If you have compliments or concerns about an experience at your clinic, or if you wish to file a complaint, please contact [facility] Patient Relations at [phone] or email us at [email or web address]         Additional Information About Your Visit        MyChart Information     [employer] gives you secure access to your electronic health record. If you see a primary care provider, you can also send messages to your care team and make appointments. If you have questions, please call your  primary care clinic.  If you do not have a primary care provider, please call [phone] and they will assist you.      [employer] is an electronic gateway that provides easy, online access to your medical records. With [employer] you can request a clinic appointment, read your test results, renew a prescription or communicate with your care team.     To access your existing account, please contact your [facility] or call [phone] for assistance.        Care EveryWhere ID     This is your Care EveryWhere ID. This could be used by other organizations to access your [place] medical records  [id number]        Your Vitals Were     Last Period                   01/06/2015            Blood Pressure from Last 3 Encounters:   06/30/17 108/69   06/27/17 102/60   06/13/17 118/74    Weight from Last 3 Encounters:   06/27/17 102.1 kg (225 lb)   06/13/17 102.1 kg (225 lb)   06/08/17 102.3 kg (225 lb 8 oz)              Today, you had the following     No orders found for display       Primary Care Provider Office Phone # Fax #    ART Wilburn [phone]       [facility] [address] [facility] [address] [place] [address]        Goals        General    Medical (pt-stated)     Notes - Note created  7/7/2016  9:15 AM by Chelsea Pulido, RN    Get blood sugars under control    Improve medication compliance    As of today's date 7/7/2016 goal is met at 0 - 25%.   Goal Status:  Active          Equal Access to Services     RAMESH GARRIDO AH: Hadii aad ku hadasho Soomaali, waaxda luqadaha, qaybta kaalmada adeegyada, lorena ellison . So [facility] [phone].    ATENCIÓN: Si habla español, tiene a trinh disposición servicios gratuitos de asistencia lingüística. Llame al [phone].    We comply with applicable federal civil rights laws and [place] laws. We do not discriminate on the basis of race, color, national origin, age,  disability sex, sexual orientation or gender identity.            Thank you!     Thank you for choosing E[facility] CLINIC  for your care. Our goal is always to provide you with excellent care. Hearing back from our patients is one way we can continue to improve our services. Please take a few minutes to complete the written survey that you may receive in the mail after your visit with us. Thank you!             Your Updated Medication List - Protect others around you: Learn how to safely use, store and throw away your medicines at www.disposemymeds.org.          This list is accurate as of: 6/30/17 12:13 PM.  Always use your most recent med list.                   Brand Name Dispense Instructions for use Diagnosis    acetaminophen 500 MG tablet    TYLENOL    100 tablet    Take 2 tablets (1,000 mg) by mouth every 6 hours as needed for pain    Closed fracture of one rib of right side, initial encounter       aspirin 81 MG EC tablet    ASPIRIN LOW DOSE    100 tablet    Take 1 tablet (81 mg) by mouth daily    Coronary artery disease involving native heart with angina pectoris, unspecified vessel or lesion type (H)       atorvastatin 80 MG tablet    LIPITOR    60 tablet    TAKE 1 TABLET (80MG) BY MOUTH DAILY    Hyperlipidemia LDL goal <100       benztropine 1 MG tablet    COGENTIN    60 tablet    Take 1 tablet (1 mg) by mouth 2 times daily    Tardive dyskinesia       blood glucose monitoring lancets     1 Box    Use to test blood sugar 2 times daily or as directed.  Ok to substitute alternative if insurance prefers.    Type 2 diabetes mellitus with diabetic neuropathy, with long-term current use of insulin (H)       citalopram 20 MG tablet    celeXA    30 tablet    Take 1 tablet (20 mg) by mouth daily    Schizoaffective disorder, depressive type (H)       DOK PLUS 8.6-50 MG per tablet   Generic drug:  senna-docusate     100 tablet    TAKE 1 TABLET BY MOUTH TWICE A DAY AS NEEDED FOR CONSTIPATION    Constipation, unspecified  constipation type       gabapentin 300 MG capsule    NEURONTIN    168 capsule    TAKE 2 CAPSULES (600MG) BY MOUTH THREE TIMES A DAY    Type 2 diabetes mellitus with diabetic neuropathy, with long-term current use of insulin (H)       glucose 4 G Chew chewable tablet     20 tablet    Take 2 every 15 minutes for blood sugar <70mg/dL. Recheck blood sugar every 15 minutes until above 70mg/dL, then eat a substantial meal.    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, with long-term current use of insulin, unspecified laterality (H)       haloperidol 5 MG tablet    HALDOL    30 tablet    Take 1 tablet (5 mg) by mouth At Bedtime    Schizoaffective disorder, depressive type (H)       hydrocortisone 1 % ointment     30 g    Apply sparingly to affected area three times daily for 14 days.    Bug bites, initial encounter       ibuprofen 600 MG tablet    ADVIL/MOTRIN    60 tablet    Take 1 tablet (600 mg) by mouth every 4 hours as needed for moderate pain    Closed fracture of one rib of right side, initial encounter       LANTUS SOLOSTAR 100 UNIT/ML injection   Generic drug:  insulin glargine     15 mL    INJECT 45 UNITS SUBCUTANEOUSLY EVERY NIGHT AT BEDTIME    Type 2 diabetes mellitus with both eyes affected by mild nonproliferative retinopathy without macular edema, with long-term current use of insulin (H)       * levofloxacin 0.5 % ophthalmic solution    QUIXIN    2 Bottle    1 drop in surgical eye as directed - 4x daily for 1 week, then stop    Cataract, left       * levofloxacin 0.5 % ophthalmic solution    QUIXIN    2 Bottle    1 drop in surgical eye as directed - 4x daily for 1 week, then stop    Nuclear cataract of right eye       losartan 100 MG tablet    COZAAR    90 tablet    TAKE 1 TABLET (100MG) BY MOUTH DAILY    Coronary artery disease involving native heart with angina pectoris, unspecified vessel or lesion type (H)       melatonin 5 MG Caps     90 capsule    Take 1  capsule by mouth daily At dinnertime    Insomnia, unspecified type       metoprolol 100 MG 24 hr tablet    TOPROL-XL    60 tablet    TAKE 1 TABLET (100MG) BY MOUTH DAILY    Coronary artery disease involving native heart with angina pectoris, unspecified vessel or lesion type (H)       NovoLOG FLEXPEN 100 UNIT/ML injection   Generic drug:  insulin aspart     15 mL    INJECT 20 UNITS SUBCUTANEOUSLY THREE TIMES A DAY (WITH MEALS), INJECT AN EXTRA 7 UNITS ONCE DAILY FOR BLOOD SUGAR OVER 500    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, with long-term current use of insulin, unspecified laterality (H)       ONE TOUCH ULTRA test strip   Generic drug:  blood glucose monitoring     150 strip    TEST TWICE DAILY AS DIRECTED    Type 2 diabetes mellitus with diabetic neuropathy, with long-term current use of insulin (H)       * order for DME     1 Device    Equipment being ordered: Rolling walker    Falls frequently       * order for DME     1 each    Hold Novolog if meals are refused.    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, with long-term current use of insulin, unspecified laterality (H)       polyethylene glycol powder    MIRALAX/GLYCOLAX    527 g    TAKE 17 GRAMS (1 CAPFUL) BY MOUTH DAILY    Constipation, unspecified constipation type       prednisoLONE acetate 1 % ophthalmic susp    PRED FORTE    2 Bottle    1 drop in surgical eye as directed, 4x daily after surgery for 1 week, 3x daily for 1 week, 2x daily for 1 week, daily for 1 week, then stop    Cataract, left       prednisoLONE acetate 1 % ophthalmic susp    PRED FORTE    1 Bottle    1 drop in surgical eye as directed, 4x daily after surgery for 1 week, 3x daily for 1 week, 2x daily for 1 week, daily for 1 week, then stop    Nuclear cataract of right eye       ranitidine 300 MG tablet    ZANTAC    30 tablet    Take 1 tablet (300 mg) by mouth At Bedtime    Gastroesophageal  reflux disease without esophagitis       TRULICITY 1.5 MG/0.5ML pen   Generic drug:  dulaglutide     2 mL    INJECT 1.5MG SUBCUTANEOUSLY EVERY SEVEN DAYS    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, with long-term current use of insulin, unspecified laterality (H)       ULTICARE MINI 31G X 6 MM   Generic drug:  insulin pen needle     100 each    USE 4 DAILY OR AS DIRECTED    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, with long-term current use of insulin, unspecified laterality (H)       vitamin D3 65846 UNITS capsule    CHOLECALCIFEROL    4 capsule    TAKE 1 CAPSULE (50,000 UNITS) BY MOUTH ONCE A WEEK    Vitamin D deficiency       * Notice:  This list has 4 medication(s) that are the same as other medications prescribed for you. Read the directions carefully, and ask your doctor or other care provider to review them with you.

## 2017-07-03 ENCOUNTER — TELEPHONE (OUTPATIENT)
Dept: FAMILY MEDICINE | Facility: CLINIC | Age: 57
End: 2017-07-03

## 2017-07-03 NOTE — TELEPHONE ENCOUNTER
The following prescription was sent to [facility]:    ranitidine (ZANTAC) 300 MG tablet 30 tablet 1 6/8/2017  --   Sig: Take 1 tablet (300 mg) by mouth At Bedtime   Class: E-Prescribe   Route: Oral   Order: [id number]   E-Prescribing Status: Receipt confirmed by pharmacy (6/8/2017 11:37 AM CDT)     Request denied.  Too soon to fill.  Allie Araya RN

## 2017-07-03 NOTE — TELEPHONE ENCOUNTER
Reason for Call:  Form, our goal is to have forms completed with 72 hours, however, some forms may require a visit or additional information.    Type of letter, form or note:  medical    Who is the form from?: OT (if other please explain)    Where did the form come from: form was mailed in    What clinic location was the form placed at?: [facility]    Where the form was placed: [facility]    What number is listed as a contact on the form?: [phone]       Additional comments: Envelope attached for mailing    Call taken on 7/3/2017 at 2:53 PM by Sandhya Mckeon

## 2017-07-04 ENCOUNTER — OFFICE VISIT (OUTPATIENT)
Dept: URGENT CARE | Facility: URGENT CARE | Age: 57
End: 2017-07-04
Payer: MEDICARE

## 2017-07-04 VITALS
DIASTOLIC BLOOD PRESSURE: 80 MMHG | BODY MASS INDEX: 42.51 KG/M2 | HEART RATE: 86 BPM | OXYGEN SATURATION: 99 % | TEMPERATURE: 98.2 F | SYSTOLIC BLOOD PRESSURE: 120 MMHG | WEIGHT: 225 LBS | RESPIRATION RATE: 18 BRPM

## 2017-07-04 DIAGNOSIS — M54.50 ACUTE RIGHT-SIDED LOW BACK PAIN WITHOUT SCIATICA: Primary | ICD-10-CM

## 2017-07-04 PROCEDURE — 99213 OFFICE O/P EST LOW 20 MIN: CPT | Performed by: FAMILY MEDICINE

## 2017-07-04 NOTE — PATIENT INSTRUCTIONS
Tylenol for the pain    Place warmth over the painful areas.      Go to the emergency room if the pain keeps worsening.     follow up with your primary care provider if not better in one week.

## 2017-07-04 NOTE — MR AVS SNAPSHOT
After Visit Summary   7/4/2017    Nena Tang    MRN: [id number]           Patient Information     Date Of Birth          1960        Visit Information        Provider Department      7/4/2017 12:55 PM [facility]        Care Instructions    Tylenol for the pain    Place warmth over the painful areas.      Go to the emergency room if the pain keeps worsening.     follow up with your primary care provider if not better in one week.               Follow-ups after your visit        Your next 10 appointments already scheduled     Jul 06, 2017  8:30 AM CDT   Return Sleep Patient with William Eng MD   [facility] ([facility])    [address]  [facility] [address]   [phone]            Aug 02, 2017  2:00 PM CDT   Post-Op with Tyra Diaz MD   Eye Clinic ([facility])    [address]  [address] [place] [address]   [phone]            Nov 15, 2017 10:15 AM CST   RETURN RETINA with Tiburcio Chacon MD   [facility] Clinic ([facility])    [address]  [address]  [facility] [address]   [phone]              Who to contact     If you have questions or need follow up information about today's clinic visit or your schedule please contact [facility] directly at [phone].  Normal or non-critical lab and imaging results will be communicated to you by MyChart, letter or phone within 4 business days after the clinic has received the results. If you do not hear from us within 7 days, please contact the clinic through MyChart or phone. If you have a critical or abnormal lab result, we will notify you by phone as soon as possible.  Submit refill requests through [employer] or call your pharmacy and they will forward the refill request to us. Please allow 3 business days for your  refill to be completed.          Additional Information About Your Visit        MyChart Information     [employer] gives you secure access to your electronic health record. If you see a primary care provider, you can also send messages to your care team and make appointments. If you have questions, please call your primary care clinic.  If you do not have a primary care provider, please call [phone] and they will assist you.        Care EveryWhere ID     This is your Care EveryWhere ID. This could be used by other organizations to access your [place] medical records  [id number]        Your Vitals Were     Pulse Temperature Respirations Last Period Pulse Oximetry BMI (Body Mass Index)    86 98.2  F (36.8  C) (Oral) 18 01/06/2015 99% 42.51 kg/m2       Blood Pressure from Last 3 Encounters:   07/04/17 120/80   06/30/17 108/69   06/27/17 102/60    Weight from Last 3 Encounters:   07/04/17 225 lb (102.1 kg)   06/27/17 225 lb (102.1 kg)   06/13/17 225 lb (102.1 kg)              Today, you had the following     No orders found for display       Primary Care Provider Office Phone # Fax #    Rowena Delaneysharlene APRGARRETT [phone]       [facility] PRIMARY CARE [address] [address]        Goals        General    Medical (pt-stated)     Notes - Note created  7/7/2016  9:15 AM by Chelsea Pulido RN    Get blood sugars under control    Improve medication compliance    As of today's date 7/7/2016 goal is met at 0 - 25%.   Goal Status:  Active          Equal Access to Services     : Hadii samira shane hadasho Sodelphine, waaxda luqadaha, qaybta kaalmada lorena catalan . So [facility] [phone].    ATENCIÓN: Si habla español, tiene a trinh disposición servicios gratuitos de asistencia lingüística. Llame al [phone].    We comply with applicable federal civil rights laws and [place] laws. We do not discriminate on the basis of race,  color, national origin, age, disability sex, sexual orientation or gender identity.            Thank you!     Thank you for choosing [facility]  for your care. Our goal is always to provide you with excellent care. Hearing back from our patients is one way we can continue to improve our services. Please take a few minutes to complete the written survey that you may receive in the mail after your visit with us. Thank you!             Your Updated Medication List - Protect others around you: Learn how to safely use, store and throw away your medicines at www.disposemymeds.org.          This list is accurate as of: 7/4/17  1:45 PM.  Always use your most recent med list.                   Brand Name Dispense Instructions for use Diagnosis    acetaminophen 500 MG tablet    TYLENOL    100 tablet    Take 2 tablets (1,000 mg) by mouth every 6 hours as needed for pain    Closed fracture of one rib of right side, initial encounter       aspirin 81 MG EC tablet    ASPIRIN LOW DOSE    100 tablet    Take 1 tablet (81 mg) by mouth daily    Coronary artery disease involving native heart with angina pectoris, unspecified vessel or lesion type (H)       atorvastatin 80 MG tablet    LIPITOR    60 tablet    TAKE 1 TABLET (80MG) BY MOUTH DAILY    Hyperlipidemia LDL goal <100       benztropine 1 MG tablet    COGENTIN    60 tablet    Take 1 tablet (1 mg) by mouth 2 times daily    Tardive dyskinesia       blood glucose monitoring lancets     1 Box    Use to test blood sugar 2 times daily or as directed.  Ok to substitute alternative if insurance prefers.    Type 2 diabetes mellitus with diabetic neuropathy, with long-term current use of insulin (H)       citalopram 20 MG tablet    celeXA    30 tablet    Take 1 tablet (20 mg) by mouth daily    Schizoaffective disorder, depressive type (H)       DOK PLUS 8.6-50 MG per tablet   Generic drug:  senna-docusate     100 tablet    TAKE 1 TABLET BY MOUTH TWICE A DAY AS NEEDED FOR  CONSTIPATION    Constipation, unspecified constipation type       gabapentin 300 MG capsule    NEURONTIN    168 capsule    TAKE 2 CAPSULES (600MG) BY MOUTH THREE TIMES A DAY    Type 2 diabetes mellitus with diabetic neuropathy, with long-term current use of insulin (H)       glucose 4 G Chew chewable tablet     20 tablet    Take 2 every 15 minutes for blood sugar <70mg/dL. Recheck blood sugar every 15 minutes until above 70mg/dL, then eat a substantial meal.    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, with long-term current use of insulin, unspecified laterality (H)       haloperidol 5 MG tablet    HALDOL    30 tablet    Take 1 tablet (5 mg) by mouth At Bedtime    Schizoaffective disorder, depressive type (H)       hydrocortisone 1 % ointment     30 g    Apply sparingly to affected area three times daily for 14 days.    Bug bites, initial encounter       ibuprofen 600 MG tablet    ADVIL/MOTRIN    60 tablet    Take 1 tablet (600 mg) by mouth every 4 hours as needed for moderate pain    Closed fracture of one rib of right side, initial encounter       LANTUS SOLOSTAR 100 UNIT/ML injection   Generic drug:  insulin glargine     15 mL    INJECT 45 UNITS SUBCUTANEOUSLY EVERY NIGHT AT BEDTIME    Type 2 diabetes mellitus with both eyes affected by mild nonproliferative retinopathy without macular edema, with long-term current use of insulin (H)       * levofloxacin 0.5 % ophthalmic solution    QUIXIN    2 Bottle    1 drop in surgical eye as directed - 4x daily for 1 week, then stop    Cataract, left       * levofloxacin 0.5 % ophthalmic solution    QUIXIN    2 Bottle    1 drop in surgical eye as directed - 4x daily for 1 week, then stop    Nuclear cataract of right eye       losartan 100 MG tablet    COZAAR    90 tablet    TAKE 1 TABLET (100MG) BY MOUTH DAILY    Coronary artery disease involving native heart with angina pectoris, unspecified vessel or lesion type (H)        melatonin 5 MG Caps     90 capsule    Take 1 capsule by mouth daily At dinnertime    Insomnia, unspecified type       metoprolol 100 MG 24 hr tablet    TOPROL-XL    60 tablet    TAKE 1 TABLET (100MG) BY MOUTH DAILY    Coronary artery disease involving native heart with angina pectoris, unspecified vessel or lesion type (H)       NovoLOG FLEXPEN 100 UNIT/ML injection   Generic drug:  insulin aspart     15 mL    INJECT 20 UNITS SUBCUTANEOUSLY THREE TIMES A DAY (WITH MEALS), INJECT AN EXTRA 7 UNITS ONCE DAILY FOR BLOOD SUGAR OVER 500    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, with long-term current use of insulin, unspecified laterality (H)       ONE TOUCH ULTRA test strip   Generic drug:  blood glucose monitoring     150 strip    TEST TWICE DAILY AS DIRECTED    Type 2 diabetes mellitus with diabetic neuropathy, with long-term current use of insulin (H)       * order for DME     1 Device    Equipment being ordered: Rolling walker    Falls frequently       * order for DME     1 each    Hold Novolog if meals are refused.    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, with long-term current use of insulin, unspecified laterality (H)       polyethylene glycol powder    MIRALAX/GLYCOLAX    527 g    TAKE 17 GRAMS (1 CAPFUL) BY MOUTH DAILY    Constipation, unspecified constipation type       prednisoLONE acetate 1 % ophthalmic susp    PRED FORTE    2 Bottle    1 drop in surgical eye as directed, 4x daily after surgery for 1 week, 3x daily for 1 week, 2x daily for 1 week, daily for 1 week, then stop    Cataract, left       prednisoLONE acetate 1 % ophthalmic susp    PRED FORTE    1 Bottle    1 drop in surgical eye as directed, 4x daily after surgery for 1 week, 3x daily for 1 week, 2x daily for 1 week, daily for 1 week, then stop    Nuclear cataract of right eye       ranitidine 300 MG tablet    ZANTAC    30 tablet    Take 1 tablet (300  mg) by mouth At Bedtime    Gastroesophageal reflux disease without esophagitis       TRULICITY 1.5 MG/0.5ML pen   Generic drug:  dulaglutide     2 mL    INJECT 1.5MG SUBCUTANEOUSLY EVERY SEVEN DAYS    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, with long-term current use of insulin, unspecified laterality (H)       ULTICARE MINI 31G X 6 MM   Generic drug:  insulin pen needle     100 each    USE 4 DAILY OR AS DIRECTED    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, with long-term current use of insulin, unspecified laterality (H)       vitamin D3 68297 UNITS capsule    CHOLECALCIFEROL    4 capsule    TAKE 1 CAPSULE (50,000 UNITS) BY MOUTH ONCE A WEEK    Vitamin D deficiency       * Notice:  This list has 4 medication(s) that are the same as other medications prescribed for you. Read the directions carefully, and ask your doctor or other care provider to review them with you.

## 2017-07-04 NOTE — PROGRESS NOTES
SUBJECTIVE:   Nena Tang is a 57 year old female presenting with a chief complaint of atraumatic moderate, right-sided low back pain and pain at the right pelvis area. The pain worsens when getting out of bed.  Patient usually sleeps on either the left or right side.  No obvious injury/heavy lifting/twisting injury.  The pain is like poking.    Onset of symptoms was a couple days ago.  Course of illness is 1-2 weeks ago. .    Severity moderate  Current and Associated symptoms: as listed above.   Treatment measures tried include nothing. ..  No pain with urination.  No blood in the urine.  No vomiting.  No diarrhea.  Last BM was yesterday and the stools looked normal.      Past Medical History:   Diagnosis Date     CAD (coronary artery disease)     5/2014 cath, nonbostructive stenosis to LAD, RCA.     Chronic low back pain 1/22/2013     Cocaine abuse, in remission      Fecal urgency 3/8/2012     History of heroin abuse      Hyperlipidemia LDL goal <100 10/31/2010     Hypertension 7/29/2013     Illiterate 8/30/2011     Irritable bowel syndrome      Left cataract      Migraine 4/19/2012     Migraine headache 4/22/2013     Moderate major depression (H) 6/8/2011     Noncompliance with medication regimen 6/8/2011     Obesity      CINDY (obstructive sleep apnea) 3/8/2012    uses CPAP     Osteopenia 10/7/2009     Schizoaffective disorder, depressive type (H) 2/25/2013     Suicidal intent 10/2/2013     Takotsubo cardiomyopathy      Type 2 diabetes mellitus (H) 8/30/2011     Uterine cancer (H) 1983     Verbal auditory hallucination 10/4/2012     Current Outpatient Prescriptions   Medication Sig Dispense Refill     ONE TOUCH ULTRA test strip TEST TWICE DAILY AS DIRECTED 150 strip 8     levofloxacin (QUIXIN) 0.5 % ophthalmic solution 1 drop in surgical eye as directed - 4x daily for 1 week, then stop 2 Bottle 1     prednisoLONE acetate (PRED FORTE) 1 % ophthalmic susp 1 drop in surgical eye as directed, 4x daily after  surgery for 1 week, 3x daily for 1 week, 2x daily for 1 week, daily for 1 week, then stop 1 Bottle 1     LANTUS SOLOSTAR 100 UNIT/ML soln INJECT 45 UNITS SUBCUTANEOUSLY EVERY NIGHT AT BEDTIME 15 mL 3     ranitidine (ZANTAC) 300 MG tablet Take 1 tablet (300 mg) by mouth At Bedtime 30 tablet 1     hydrocortisone 1 % ointment Apply sparingly to affected area three times daily for 14 days. 30 g 0     vitamin D3 (CHOLECALCIFEROL) 46137 UNITS capsule TAKE 1 CAPSULE (50,000 UNITS) BY MOUTH ONCE A WEEK 4 capsule 3     DOK PLUS 50-8.6 MG per tablet TAKE 1 TABLET BY MOUTH TWICE A DAY AS NEEDED FOR CONSTIPATION 100 tablet 3     losartan (COZAAR) 100 MG tablet TAKE 1 TABLET (100MG) BY MOUTH DAILY 90 tablet 1     atorvastatin (LIPITOR) 80 MG tablet TAKE 1 TABLET (80MG) BY MOUTH DAILY 60 tablet 1     metoprolol (TOPROL-XL) 100 MG 24 hr tablet TAKE 1 TABLET (100MG) BY MOUTH DAILY 60 tablet 1     gabapentin (NEURONTIN) 300 MG capsule TAKE 2 CAPSULES (600MG) BY MOUTH THREE TIMES A  capsule 3     TRULICITY 1.5 MG/0.5ML pen INJECT 1.5MG SUBCUTANEOUSLY EVERY SEVEN DAYS 2 mL 11     levofloxacin (QUIXIN) 0.5 % ophthalmic solution 1 drop in surgical eye as directed - 4x daily for 1 week, then stop 2 Bottle 1     prednisoLONE acetate (PRED FORTE) 1 % ophthalmic susp 1 drop in surgical eye as directed, 4x daily after surgery for 1 week, 3x daily for 1 week, 2x daily for 1 week, daily for 1 week, then stop 2 Bottle 1     polyethylene glycol (MIRALAX/GLYCOLAX) powder TAKE 17 GRAMS (1 CAPFUL) BY MOUTH DAILY 527 g 3     NOVOLOG FLEXPEN 100 UNIT/ML soln INJECT 20 UNITS SUBCUTANEOUSLY THREE TIMES A DAY (WITH MEALS), INJECT AN EXTRA 7 UNITS ONCE DAILY FOR BLOOD SUGAR OVER 500 15 mL 3     ULTICARE MINI 31G X 6 MM insulin pen needle USE 4 DAILY OR AS DIRECTED 100 each 11     aspirin (ASPIRIN LOW DOSE) 81 MG EC tablet Take 1 tablet (81 mg) by mouth daily 100 tablet 4     acetaminophen (TYLENOL) 500 MG tablet Take 2 tablets (1,000 mg) by mouth  every 6 hours as needed for pain 100 tablet 0     ibuprofen (ADVIL,MOTRIN) 600 MG tablet Take 1 tablet (600 mg) by mouth every 4 hours as needed for moderate pain 60 tablet 0     melatonin 5 MG CAPS Take 1 capsule by mouth daily At dinnertime 90 capsule 1     glucose 4 G CHEW Take 2 every 15 minutes for blood sugar <70mg/dL. Recheck blood sugar every 15 minutes until above 70mg/dL, then eat a substantial meal. 20 tablet 1     order for DME Hold Novolog if meals are refused. 1 each 0     blood glucose monitoring (ONE TOUCH DELICA) lancets Use to test blood sugar 2 times daily or as directed.  Ok to substitute alternative if insurance prefers. 1 Box prn     citalopram (CELEXA) 20 MG tablet Take 1 tablet (20 mg) by mouth daily 30 tablet 2     haloperidol (HALDOL) 5 MG tablet Take 1 tablet (5 mg) by mouth At Bedtime 30 tablet 2     benztropine (COGENTIN) 1 MG tablet Take 1 tablet (1 mg) by mouth 2 times daily 60 tablet 3     order for DME Equipment being ordered: Rolling walker 1 Device 0     Social History   Substance Use Topics     Smoking status: Never Smoker     Smokeless tobacco: Never Used     Alcohol use No      Comment: last month       ROS:  Review of systems negative except as stated above.    OBJECTIVE  :/80 (BP Location: Right arm, Cuff Size: Adult Large)  Pulse 86  Temp 98.2  F (36.8  C) (Oral)  Resp 18  Wt 225 lb (102.1 kg)  LMP 01/06/2015  SpO2 99%  BMI 42.51 kg/m2  GENERAL APPEARANCE: healthy, alert and no distress  NEURO: Normal strength and tone, sensory exam grossly normal, mentation intact, speech normal, gait normal including heel/toe/tandem walking, light touch normal, normal strength throughout and deep tendon reflexes' 1/4 at the patellas and Achilles.   SKIN: no suspicious lesions or rashes  BACK:  No pain with palpation of the spinous processes.  There is a lot of pain with palpation over the muscles of the right lumbar region.  No obvious spasm.   There is also pain with palpation  over the muscles of the right flank.   Normal ROM.  There is, however, pain at the right lumbar region when bending to the right and to the left.     ASSESSMENT:  Low Back Pain, most likely due to myofascial strain.     PLAN:  Tylenol  Warmth  follow up with the primary care provider if not better in one week.   See orders in Epic    Philip Martinez MD

## 2017-07-06 PROCEDURE — 87338 HPYLORI STOOL AG IA: CPT | Performed by: NURSE PRACTITIONER

## 2017-07-07 DIAGNOSIS — K21.00 GASTROESOPHAGEAL REFLUX DISEASE WITH ESOPHAGITIS: ICD-10-CM

## 2017-07-10 LAB
H PYLORI AG STL QL IA: NORMAL
MICRO REPORT STATUS: NORMAL
SPECIMEN SOURCE: NORMAL

## 2017-07-13 ENCOUNTER — OFFICE VISIT (OUTPATIENT)
Dept: BEHAVIORAL HEALTH | Facility: CLINIC | Age: 57
End: 2017-07-13
Payer: MEDICARE

## 2017-07-13 ENCOUNTER — OFFICE VISIT (OUTPATIENT)
Dept: FAMILY MEDICINE | Facility: CLINIC | Age: 57
End: 2017-07-13
Payer: MEDICARE

## 2017-07-13 ENCOUNTER — HOSPITAL ENCOUNTER (OUTPATIENT)
Dept: CT IMAGING | Facility: CLINIC | Age: 57
Discharge: HOME | End: 2017-07-13
Attending: NURSE PRACTITIONER | Admitting: NURSE PRACTITIONER
Payer: MEDICARE

## 2017-07-13 VITALS
TEMPERATURE: 98.2 F | HEART RATE: 88 BPM | RESPIRATION RATE: 16 BRPM | DIASTOLIC BLOOD PRESSURE: 70 MMHG | BODY MASS INDEX: 42.89 KG/M2 | WEIGHT: 227 LBS | OXYGEN SATURATION: 92 % | SYSTOLIC BLOOD PRESSURE: 124 MMHG

## 2017-07-13 DIAGNOSIS — F25.1 SCHIZOAFFECTIVE DISORDER, DEPRESSIVE TYPE (H): ICD-10-CM

## 2017-07-13 DIAGNOSIS — Z85.42 HISTORY OF UTERINE CANCER: Primary | ICD-10-CM

## 2017-07-13 DIAGNOSIS — R10.31 ABDOMINAL PAIN, RIGHT LOWER QUADRANT: ICD-10-CM

## 2017-07-13 DIAGNOSIS — Z85.42 HISTORY OF UTERINE CANCER: ICD-10-CM

## 2017-07-13 DIAGNOSIS — G24.01 TARDIVE DYSKINESIA: ICD-10-CM

## 2017-07-13 DIAGNOSIS — F25.1 SCHIZOAFFECTIVE DISORDER, DEPRESSIVE TYPE (H): Primary | ICD-10-CM

## 2017-07-13 DIAGNOSIS — F32.1 MODERATE MAJOR DEPRESSION (H): ICD-10-CM

## 2017-07-13 LAB
ALBUMIN UR-MCNC: NEGATIVE MG/DL
ANION GAP SERPL CALCULATED.3IONS-SCNC: 11 MMOL/L (ref 3–14)
APPEARANCE UR: CLEAR
BACTERIA #/AREA URNS HPF: ABNORMAL /HPF
BASOPHILS # BLD AUTO: 0 10E9/L (ref 0–0.2)
BASOPHILS NFR BLD AUTO: 0.3 %
BILIRUB UR QL STRIP: NEGATIVE
BUN SERPL-MCNC: 18 MG/DL (ref 7–30)
CALCIUM SERPL-MCNC: 9.2 MG/DL (ref 8.5–10.1)
CHLORIDE SERPL-SCNC: 105 MMOL/L (ref 94–109)
CO2 SERPL-SCNC: 25 MMOL/L (ref 20–32)
COLOR UR AUTO: YELLOW
CREAT SERPL-MCNC: 0.87 MG/DL (ref 0.52–1.04)
DIFFERENTIAL METHOD BLD: ABNORMAL
EOSINOPHIL # BLD AUTO: 0.1 10E9/L (ref 0–0.7)
EOSINOPHIL NFR BLD AUTO: 1.7 %
ERYTHROCYTE [DISTWIDTH] IN BLOOD BY AUTOMATED COUNT: 13.1 % (ref 10–15)
GFR SERPL CREATININE-BSD FRML MDRD: 67 ML/MIN/1.7M2
GLUCOSE SERPL-MCNC: 180 MG/DL (ref 70–99)
GLUCOSE UR STRIP-MCNC: NEGATIVE MG/DL
HCT VFR BLD AUTO: 33.3 % (ref 35–47)
HGB BLD-MCNC: 11 G/DL (ref 11.7–15.7)
HGB UR QL STRIP: NEGATIVE
KETONES UR STRIP-MCNC: ABNORMAL MG/DL
LEUKOCYTE ESTERASE UR QL STRIP: ABNORMAL
LYMPHOCYTES # BLD AUTO: 2.3 10E9/L (ref 0.8–5.3)
LYMPHOCYTES NFR BLD AUTO: 33.7 %
MCH RBC QN AUTO: 30.9 PG (ref 26.5–33)
MCHC RBC AUTO-ENTMCNC: 33 G/DL (ref 31.5–36.5)
MCV RBC AUTO: 94 FL (ref 78–100)
MONOCYTES # BLD AUTO: 0.5 10E9/L (ref 0–1.3)
MONOCYTES NFR BLD AUTO: 7.1 %
NEUTROPHILS # BLD AUTO: 4 10E9/L (ref 1.6–8.3)
NEUTROPHILS NFR BLD AUTO: 57.2 %
NITRATE UR QL: NEGATIVE
NON-SQ EPI CELLS #/AREA URNS LPF: ABNORMAL /LPF
PH UR STRIP: 6 PH (ref 5–7)
PLATELET # BLD AUTO: 226 10E9/L (ref 150–450)
POTASSIUM SERPL-SCNC: 4.2 MMOL/L (ref 3.4–5.3)
RBC # BLD AUTO: 3.56 10E12/L (ref 3.8–5.2)
RBC #/AREA URNS AUTO: ABNORMAL /HPF (ref 0–2)
SODIUM SERPL-SCNC: 141 MMOL/L (ref 133–144)
SP GR UR STRIP: 1.01 (ref 1–1.03)
URN SPEC COLLECT METH UR: ABNORMAL
UROBILINOGEN UR STRIP-ACNC: 1 EU/DL (ref 0.2–1)
WBC # BLD AUTO: 6.9 10E9/L (ref 4–11)
WBC #/AREA URNS AUTO: ABNORMAL /HPF (ref 0–2)

## 2017-07-13 PROCEDURE — 99215 OFFICE O/P EST HI 40 MIN: CPT | Performed by: NURSE PRACTITIONER

## 2017-07-13 PROCEDURE — 74177 CT ABD & PELVIS W/CONTRAST: CPT

## 2017-07-13 PROCEDURE — 85025 COMPLETE CBC W/AUTO DIFF WBC: CPT | Performed by: NURSE PRACTITIONER

## 2017-07-13 PROCEDURE — 36415 COLL VENOUS BLD VENIPUNCTURE: CPT | Performed by: NURSE PRACTITIONER

## 2017-07-13 PROCEDURE — 25000128 H RX IP 250 OP 636: Performed by: NURSE PRACTITIONER

## 2017-07-13 PROCEDURE — 25000125 ZZHC RX 250: Performed by: NURSE PRACTITIONER

## 2017-07-13 PROCEDURE — 90834 PSYTX W PT 45 MINUTES: CPT | Performed by: SOCIAL WORKER

## 2017-07-13 PROCEDURE — 81001 URINALYSIS AUTO W/SCOPE: CPT | Performed by: NURSE PRACTITIONER

## 2017-07-13 PROCEDURE — 80048 BASIC METABOLIC PNL TOTAL CA: CPT | Performed by: NURSE PRACTITIONER

## 2017-07-13 RX ORDER — BENZTROPINE MESYLATE 1 MG/1
TABLET ORAL
Qty: 90 TABLET | Refills: 3 | Status: ON HOLD | OUTPATIENT
Start: 2017-07-13 | End: 2017-08-29

## 2017-07-13 RX ORDER — IOPAMIDOL 755 MG/ML
100 INJECTION, SOLUTION INTRAVASCULAR ONCE
Status: COMPLETED | OUTPATIENT
Start: 2017-07-13 | End: 2017-07-13

## 2017-07-13 RX ORDER — HALOPERIDOL 5 MG/1
10 TABLET ORAL AT BEDTIME
Qty: 60 TABLET | Refills: 2 | Status: ON HOLD | COMMUNITY
Start: 2017-07-13 | End: 2017-08-29

## 2017-07-13 RX ADMIN — SODIUM CHLORIDE 68 ML: 9 INJECTION, SOLUTION INTRAVENOUS at 16:38

## 2017-07-13 RX ADMIN — IOPAMIDOL 100 ML: 755 INJECTION, SOLUTION INTRAVENOUS at 16:38

## 2017-07-13 NOTE — MR AVS SNAPSHOT
After Visit Summary   7/13/2017    Nena Tang    MRN: [id number]           Patient Information     Date Of Birth          1960        Visit Information        Provider Department      7/13/2017 1:00 PM Richardson Lopez, [facility]        Today's Diagnoses     Schizoaffective disorder, depressive type (H)    -  1    Moderate major depression (H)           Follow-ups after your visit        Your next 10 appointments already scheduled     Aug 02, 2017  2:00 PM CDT   Post-Op with Tyra Diaz MD   Eye Clinic ([facility])    [address]  [address]  [facility] [address]   [phone]            Nov 15, 2017 10:15 AM CST   RETURN RETINA with Tiburcio Chacon MD   Eye Clinic ([facility])    [address]  [address]  [facility] [address]   [phone]              Future tests that were ordered for you today     Open Future Orders        Priority Expected Expires Ordered    CT Abdomen Pelvis w Contrast Routine  7/13/2018 7/13/2017            Who to contact     If you have questions or need follow up information about today's clinic visit or your schedule please contact [facility] directly at [phone].  Normal or non-critical lab and imaging results will be communicated to you by MyChart, letter or phone within 4 business days after the clinic has received the results. If you do not hear from us within 7 days, please contact the clinic through MyChart or phone. If you have a critical or abnormal lab result, we will notify you by phone as soon as possible.  Submit refill requests through [employer] or call your pharmacy and they will forward the refill request to us. Please allow 3 business days for your refill to be completed.          Additional Information About Your Visit        MyChart Information     [employer] gives  you secure access to your electronic health record. If you see a primary care provider, you can also send messages to your care team and make appointments. If you have questions, please call your primary care clinic.  If you do not have a primary care provider, please call [phone] and they will assist you.        Care EveryWhere ID     This is your Care EveryWhere ID. This could be used by other organizations to access your [place] medical records  [id number]        Your Vitals Were     Last Period                   01/06/2015            Blood Pressure from Last 3 Encounters:   07/13/17 124/70   07/04/17 120/80   06/30/17 108/69    Weight from Last 3 Encounters:   07/13/17 227 lb (103 kg)   07/04/17 225 lb (102.1 kg)   06/27/17 225 lb (102.1 kg)              Today, you had the following     No orders found for display         Today's Medication Changes          These changes are accurate as of: 7/13/17 11:59 PM.  If you have any questions, ask your nurse or doctor.               These medicines have changed or have updated prescriptions.        Dose/Directions    benztropine 1 MG tablet   Commonly known as:  COGENTIN   This may have changed:    - how much to take  - how to take this  - when to take this  - additional instructions   Used for:  Tardive dyskinesia   Changed by:  Antionette Campo APRN CNP        2 mg in the morning and 1 mg at night.   Quantity:  90 tablet   Refills:  3       haloperidol 5 MG tablet   Commonly known as:  HALDOL   This may have changed:  how much to take   Used for:  Schizoaffective disorder, depressive type (H)   Changed by:  Antionette Campo APRN CNP        Dose:  10 mg   Take 2 tablets (10 mg) by mouth At Bedtime   Quantity:  60 tablet   Refills:  2            Where to get your medicines      These medications were sent to [address] [address]     Phone:  [phone]      benztropine 1 MG tablet                Primary Care Provider Office Phone # Fax #    ART Wilburn [phone]       [facility] [address] [facility] [address]  [facility] [address]        Goals        General    Medical (pt-stated)     Notes - Note created  7/7/2016  9:15 AM by Chelsea Pulido RN    Get blood sugars under control    Improve medication compliance    As of today's date 7/7/2016 goal is met at 0 - 25%.   Goal Status:  Active          Equal Access to Services     [facility]: Hadii aad ku hadasho Soomaali, waaxda luqadaha, qaybta kaalmada adeegyada, waxmichelle toscano hayfernando ellison . So [facility] [phone].    ATENCIÓN: Si habla español, tiene a trinh disposición servicios gratuitos de asistencia lingüística. Llame al [phone].    We comply with applicable federal civil rights laws and [place] laws. We do not discriminate on the basis of race, color, national origin, age, disability sex, sexual orientation or gender identity.            Thank you!     Thank you for choosing [facility]  for your care. Our goal is always to provide you with excellent care. Hearing back from our patients is one way we can continue to improve our services. Please take a few minutes to complete the written survey that you may receive in the mail after your visit with us. Thank you!             Your Updated Medication List - Protect others around you: Learn how to safely use, store and throw away your medicines at www.disposemymeds.org.          This list is accurate as of: 7/13/17 11:59 PM.  Always use your most recent med list.                   Brand Name Dispense Instructions for use Diagnosis    acetaminophen 500 MG tablet    TYLENOL    100 tablet    Take 2 tablets (1,000 mg) by mouth every 6 hours as needed for pain    Closed fracture of one rib of right side, initial encounter       aspirin 81 MG EC tablet    ASPIRIN LOW DOSE     100 tablet    Take 1 tablet (81 mg) by mouth daily    Coronary artery disease involving native heart with angina pectoris, unspecified vessel or lesion type (H)       atorvastatin 80 MG tablet    LIPITOR    60 tablet    TAKE 1 TABLET (80MG) BY MOUTH DAILY    Hyperlipidemia LDL goal <100       benztropine 1 MG tablet    COGENTIN    90 tablet    2 mg in the morning and 1 mg at night.    Tardive dyskinesia       blood glucose monitoring lancets     1 Box    Use to test blood sugar 2 times daily or as directed.  Ok to substitute alternative if insurance prefers.    Type 2 diabetes mellitus with diabetic neuropathy, with long-term current use of insulin (H)       citalopram 20 MG tablet    celeXA    30 tablet    Take 1 tablet (20 mg) by mouth daily    Schizoaffective disorder, depressive type (H)       DOK PLUS 8.6-50 MG per tablet   Generic drug:  senna-docusate     100 tablet    TAKE 1 TABLET BY MOUTH TWICE A DAY AS NEEDED FOR CONSTIPATION    Constipation, unspecified constipation type       gabapentin 300 MG capsule    NEURONTIN    168 capsule    TAKE 2 CAPSULES (600MG) BY MOUTH THREE TIMES A DAY    Type 2 diabetes mellitus with diabetic neuropathy, with long-term current use of insulin (H)       glucose 4 G Chew chewable tablet     20 tablet    Take 2 every 15 minutes for blood sugar <70mg/dL. Recheck blood sugar every 15 minutes until above 70mg/dL, then eat a substantial meal.    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    Bug bites, initial encounter       ibuprofen 600 MG tablet    ADVIL/MOTRIN    60 tablet    Take 1 tablet (600 mg) by mouth every 4  hours as needed for moderate pain    Closed fracture of one rib of right side, initial encounter       LANTUS SOLOSTAR 100 UNIT/ML injection   Generic drug:  insulin glargine     15 mL    INJECT 45 UNITS SUBCUTANEOUSLY EVERY NIGHT AT BEDTIME    Type 2 diabetes mellitus with both eyes affected by mild nonproliferative retinopathy without macular edema, with long-term current use of insulin (H)       * levofloxacin 0.5 % ophthalmic solution    QUIXIN    2 Bottle    1 drop in surgical eye as directed - 4x daily for 1 week, then stop    Cataract, left       * levofloxacin 0.5 % ophthalmic solution    QUIXIN    2 Bottle    1 drop in surgical eye as directed - 4x daily for 1 week, then stop    Nuclear cataract of right eye       losartan 100 MG tablet    COZAAR    90 tablet    TAKE 1 TABLET (100MG) BY MOUTH DAILY    Coronary artery disease involving native heart with angina pectoris, unspecified vessel or lesion type (H)       melatonin 5 MG Caps     90 capsule    Take 1 capsule by mouth daily At dinnertime    Insomnia, unspecified type       metoprolol 100 MG 24 hr tablet    TOPROL-XL    60 tablet    TAKE 1 TABLET (100MG) BY MOUTH DAILY    Coronary artery disease involving native heart with angina pectoris, unspecified vessel or lesion type (H)       NovoLOG FLEXPEN 100 UNIT/ML injection   Generic drug:  insulin aspart     15 mL    INJECT 20 UNITS SUBCUTANEOUSLY THREE TIMES A DAY (WITH MEALS), INJECT AN EXTRA 7 UNITS ONCE DAILY FOR BLOOD SUGAR OVER 500    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, with long-term current use of insulin, unspecified laterality (H)       ONE TOUCH ULTRA test strip   Generic drug:  blood glucose monitoring     150 strip    TEST TWICE DAILY AS DIRECTED    Type 2 diabetes mellitus with diabetic neuropathy, with long-term current use of insulin (H)       * order for DME     1 Device    Equipment being ordered: Rolling walker    Falls frequently        * order for DME     1 each    Hold Novolog if meals are refused.    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, with long-term current use of insulin, unspecified laterality (H)       polyethylene glycol powder    MIRALAX/GLYCOLAX    527 g    TAKE 17 GRAMS (1 CAPFUL) BY MOUTH DAILY    Constipation, unspecified constipation type       prednisoLONE acetate 1 % ophthalmic susp    PRED FORTE    2 Bottle    1 drop in surgical eye as directed, 4x daily after surgery for 1 week, 3x daily for 1 week, 2x daily for 1 week, daily for 1 week, then stop    Cataract, left       prednisoLONE acetate 1 % ophthalmic susp    PRED FORTE    1 Bottle    1 drop in surgical eye as directed, 4x daily after surgery for 1 week, 3x daily for 1 week, 2x daily for 1 week, daily for 1 week, then stop    Nuclear cataract of right eye       ranitidine 300 MG tablet    ZANTAC    30 tablet    Take 1 tablet (300 mg) by mouth At Bedtime    Gastroesophageal reflux disease without esophagitis       TRULICITY 1.5 MG/0.5ML pen   Generic drug:  dulaglutide     2 mL    INJECT 1.5MG SUBCUTANEOUSLY EVERY SEVEN DAYS    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, with long-term current use of insulin, unspecified laterality (H)       ULTICARE MINI 31G X 6 MM   Generic drug:  insulin pen needle     100 each    USE 4 DAILY OR AS DIRECTED    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, with long-term current use of insulin, unspecified laterality (H)       vitamin D3 17360 UNITS capsule    CHOLECALCIFEROL    4 capsule    TAKE 1 CAPSULE (50,000 UNITS) BY MOUTH ONCE A WEEK    Vitamin D deficiency       * Notice:  This list has 4 medication(s) that are the same as other medications prescribed for you. Read the directions carefully, and ask your doctor or other care provider to review them with you.

## 2017-07-13 NOTE — PROGRESS NOTES
"SUBJECTIVE:                                                    Nena Tang is a 57 year old female who presents to clinic today for the following health issues:  [facility]: [facility]    Right sided pain (lower frontal abdomen)    Duration: a couple weeks    Description (location/character/radiation): Has trouble getting out of bed due to the physical pain to try and do so.  Would like an x-ray if applicable.  Has been seen a couple times.    Intensity:  10/10    Accompanying signs and symptoms: Right arm hurts (constant)    History (similar episodes/previous evaluation): None    Precipitating or alleviating factors: None    Therapies tried and outcome: Tylenol, Ibuprofen, Ice Packs with no success.    Patient has been sore in the lower right side of the abdomen and her right shoulder. Hurts to the touch. Reports that she has \"never\" had this pain before and a duration of a month. Patient reports that it hurts the majority in the morning when she wakes up and painful when she lays down. She usually sleeps on her left side. Getting out of the bed, she has to roll over so that her feet are touching the ground and then she gradually stands up. Has to bend over and wait for the pain to go away. She \"just has to deal with it\". Has taken Tylenol and Ibuprofen to alleviate the pain, but did not help. Wants to get an x-ray because the pain has been constant. States that the pain is in her back, but mainly in her abdomen.    Also has pain in the top middle quadrant and states that she cannot eat in the morning because of this. But notes that this pain is different from her lower right abdominal pain. Is constipated right now, but has regular bowel movements. Had a hystectomy, ovaries were also taken out. Reports to not have had had a menstrual cycle \"in a long time\". Does have nausea time to time. Denies fever or vomiting.    Exercise  Is able to walk. Went with her son to get a car, but had to use her cane because her " "abdomen was hurting. But overall she does not exercise.    Sleep  Sleep is not good. She states that it's hard to fall sleep and go to the bathroom. She wakes up a lot during the night. Reports that she has always slept on her left side, but used to be able to move side to side. She sleeps with her CPAP at night. Reports that the caregiver at night reminds her to put it on every night.     Weight  Does not know if she has lost or gained any weight.  Wt Readings from Last 5 Encounters:   07/13/17 103 kg (227 lb)   07/04/17 102.1 kg (225 lb)   06/27/17 102.1 kg (225 lb)   06/13/17 102.1 kg (225 lb)   06/08/17 102.3 kg (225 lb 8 oz)     Fall  Reports to not have fallen or slipped in the last 2 months. Was advised to take her cane everywhere.    Medication  Has not been able to move before taking the Haldol. Hadol was increased last month, but the same dose of Congentin.    Mental Health Follow up: Depression and Anxiety    Status since last visit: Stable    See PHQ-9 for current symptoms.  Other associated symptoms: None    Complicating factors: Abdomen pain, hallucinations   Significant life event:  No   Current substance abuse:  None  Anxiety or Panic symptoms:  No    Reports not to be depressed. Denies suicide ideation, feeling of hopelessness or panic attacks. Gets along with others and likes where she is living right now.     Hallucinations  Reports that the \"zunilda in her room\" is still there. States that the light in her room \"blew out\" and the whole lamp broke. Believes that the man in her room \"did it\" to get to her. Notes that he can only \"get to her\" of she \"listens\" to him.     Family  Worried about patient's sister because she's sick. She has a phone and is able to call to talk. Wants to go and see her sister in August. Will be going to her son's house to help watch her grandchildren because her daughter-in-law is in the hospital for substance abuse. States that her therapist doesn't think that going to her " son's house is a good idea.     Appetite - Doesn't eat in the morning, but lunch and dinner is good.   Smoking - No  Alcohol - No  Street drugs - No    PHQ-9  English PHQ-9   Any Language           Social History   Substance Use Topics     Smoking status: Never Smoker     Smokeless tobacco: Never Used     Alcohol use No      Comment: last month      Problem list and histories reviewed & adjusted, as indicated.  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)     Illiterate     Irritable bowel syndrome     overweight - BMI >35     Takotsubo cardiomyopathy     CAD (coronary artery disease)     Restless legs syndrome (RLS)     CINDY (obstructive sleep apnea)- mild AHI 10.3     Verbal auditory hallucination     Chronic low back pain     Schizoaffective disorder, depressive type (H)     Migraine headache     HTN, goal below 140/90     Health Care Home     Lumbago     Cervicalgia     Cocaine abuse, episodic     Suicidal ideation     Esophageal reflux     Mild nonproliferative diabetic retinopathy (H)     Tardive dyskinesia     Alcohol use     Left cataract     Falls frequently     History of uterine cancer     Depression     Psychophysiological insomnia     Dysuria     Asymptomatic postmenopausal status     Past Surgical History:   Procedure Laterality Date     C OOPHORECTORMY FOR RAHEL, W/BX  1983    UTERINE     COLONOSCOPY N/A 3/16/2017    Procedure: COLONOSCOPY;  Surgeon: Traci Gonzalez MD;  Location: [facility]     Coronary CTA  5/21/2014     HYSTERECTOMY  1983    uterine cancer yearly pap's per provider.     LAPAROSCOPIC CHOLECYSTECTOMY  2008     PHACOEMULSIFICATION CLEAR CORNEA WITH STANDARD INTRAOCULAR LENS IMPLANT Left 5/5/2017    Procedure: PHACOEMULSIFICATION  CLEAR CORNEA WITH STANDARD INTRAOCULAR LENS IMPLANT;  LEFT EYE PHACOEMULSIFICATION CLEAR CORNEA WITH STANDARD INTRAOCULAR LENS IMPLANT ;  Surgeon: Tyra Diaz MD;  Location:  [facility]     PHACOEMULSIFICATION CLEAR CORNEA WITH STANDARD INTRAOCULAR LENS IMPLANT Right 2017    Procedure: PHACOEMULSIFICATION CLEAR CORNEA WITH STANDARD INTRAOCULAR LENS IMPLANT;  RIGHT EYE PHACOEMULSIFICATION CLEAR CORNEA WITH STANDARD INTRAOCULAR LENS IMPLANT;  Surgeon: Tyra Diaz MD;  Location:  [facility]     RELEASE TRIGGER FINGER  10/11/2012    Left thumb. Procedure: RELEASE TRIGGER FINGER;  LEFT THUMB TRIGGER RELEASE;  Surgeon: Tay Langley MD;  Location:  [facility]     RELEASE TRIGGER FINGER Right 2016    Procedure: RELEASE TRIGGER FINGER;  Surgeon: Albino Castañeda MD;  Location: [facility]       Social History   Substance Use Topics     Smoking status: Never Smoker     Smokeless tobacco: Never Used     Alcohol use No      Comment: last month     Family History   Problem Relation Age of Onset     CANCER Mother      BLADDER     Respiratory Mother      COPD     GASTROINTESTINAL DISEASE Mother      CIRRHOSIS OF LI BOLIVAR     Alcohol/Drug Mother      DIABETES Mother      Hypertension Mother      Lipids Mother      C.A.D. Mother      Glaucoma Mother      Alcohol/Drug Sister      MENTAL ILLNESS Sister      Alcohol/Drug Sister      Psychotic Disorder Sister      CANCER Maternal Grandmother      UNKNOWN TYPE     CANCER Brother      COLON     Cancer - colorectal Brother      IN HIS LATE 30S     Alcohol/Drug Brother       OF HEROIN OVERDOSE AT AGE 22 YRS     Macular Degeneration No family hx of            Current Outpatient Prescriptions   Medication Sig Dispense Refill     ONE TOUCH ULTRA test strip TEST TWICE DAILY AS DIRECTED 150 strip 8     levofloxacin (QUIXIN) 0.5 % ophthalmic solution 1 drop in surgical eye as directed - 4x daily for 1 week, then stop 2 Bottle 1     prednisoLONE acetate (PRED FORTE) 1 % ophthalmic susp 1  drop in surgical eye as directed, 4x daily after surgery for 1 week, 3x daily for 1 week, 2x daily for 1 week, daily for 1 week, then stop 1 Bottle 1     LANTUS SOLOSTAR 100 UNIT/ML soln INJECT 45 UNITS SUBCUTANEOUSLY EVERY NIGHT AT BEDTIME 15 mL 3     ranitidine (ZANTAC) 300 MG tablet Take 1 tablet (300 mg) by mouth At Bedtime 30 tablet 1     hydrocortisone 1 % ointment Apply sparingly to affected area three times daily for 14 days. 30 g 0     vitamin D3 (CHOLECALCIFEROL) 65140 UNITS capsule TAKE 1 CAPSULE (50,000 UNITS) BY MOUTH ONCE A WEEK 4 capsule 3     DOK PLUS 50-8.6 MG per tablet TAKE 1 TABLET BY MOUTH TWICE A DAY AS NEEDED FOR CONSTIPATION 100 tablet 3     losartan (COZAAR) 100 MG tablet TAKE 1 TABLET (100MG) BY MOUTH DAILY 90 tablet 1     atorvastatin (LIPITOR) 80 MG tablet TAKE 1 TABLET (80MG) BY MOUTH DAILY 60 tablet 1     metoprolol (TOPROL-XL) 100 MG 24 hr tablet TAKE 1 TABLET (100MG) BY MOUTH DAILY 60 tablet 1     gabapentin (NEURONTIN) 300 MG capsule TAKE 2 CAPSULES (600MG) BY MOUTH THREE TIMES A  capsule 3     TRULICITY 1.5 MG/0.5ML pen INJECT 1.5MG SUBCUTANEOUSLY EVERY SEVEN DAYS 2 mL 11     levofloxacin (QUIXIN) 0.5 % ophthalmic solution 1 drop in surgical eye as directed - 4x daily for 1 week, then stop 2 Bottle 1     prednisoLONE acetate (PRED FORTE) 1 % ophthalmic susp 1 drop in surgical eye as directed, 4x daily after surgery for 1 week, 3x daily for 1 week, 2x daily for 1 week, daily for 1 week, then stop 2 Bottle 1     polyethylene glycol (MIRALAX/GLYCOLAX) powder TAKE 17 GRAMS (1 CAPFUL) BY MOUTH DAILY 527 g 3     NOVOLOG FLEXPEN 100 UNIT/ML soln INJECT 20 UNITS SUBCUTANEOUSLY THREE TIMES A DAY (WITH MEALS), INJECT AN EXTRA 7 UNITS ONCE DAILY FOR BLOOD SUGAR OVER 500 15 mL 3     ULTICARE MINI 31G X 6 MM insulin pen needle USE 4 DAILY OR AS DIRECTED 100 each 11     aspirin (ASPIRIN LOW DOSE) 81 MG EC tablet Take 1 tablet (81 mg) by mouth daily 100 tablet 4     acetaminophen (TYLENOL)  500 MG tablet Take 2 tablets (1,000 mg) by mouth every 6 hours as needed for pain 100 tablet 0     ibuprofen (ADVIL,MOTRIN) 600 MG tablet Take 1 tablet (600 mg) by mouth every 4 hours as needed for moderate pain 60 tablet 0     melatonin 5 MG CAPS Take 1 capsule by mouth daily At dinnertime 90 capsule 1     glucose 4 G CHEW Take 2 every 15 minutes for blood sugar <70mg/dL. Recheck blood sugar every 15 minutes until above 70mg/dL, then eat a substantial meal. 20 tablet 1     order for DME Hold Novolog if meals are refused. 1 each 0     blood glucose monitoring (ONE TOUCH DELICA) lancets Use to test blood sugar 2 times daily or as directed.  Ok to substitute alternative if insurance prefers. 1 Box prn     citalopram (CELEXA) 20 MG tablet Take 1 tablet (20 mg) by mouth daily 30 tablet 2     haloperidol (HALDOL) 5 MG tablet Take 1 tablet (5 mg) by mouth At Bedtime 30 tablet 2     benztropine (COGENTIN) 1 MG tablet Take 1 tablet (1 mg) by mouth 2 times daily 60 tablet 3     order for DME Equipment being ordered: Rolling walker 1 Device 0     Neurontin (gabapentin)  High Risk Drug Monitoring? Yes  Name of Drug being monitored: Neurontin  Reason for drug, and what is being monitored and why: Mood-stable/SI-none, seizure, postherpetic neuralgia  Follow-up Plan: Continue current treatment plan    Allergies   Allergen Reactions     Imidazole Antifungals Hives     Tolerates diflucan     Ketoprofen Itching     Pruritis to topical     Lisinopril Hives     Metformin Other (See Comments)     Patient hospitalized for lactic acidosis - admitting provider suspectd caused by metformin     Metronidazole Hives     Posaconazole Hives     Tolerates diflucan     Recent Labs   Lab Test  06/27/17   1358  03/28/17   1114  01/27/17   1211  01/24/17   1957  01/22/17   1936  12/29/16   0909   10/25/16   0815   07/13/16   1350   07/14/15   1215   09/03/13   1156   A1C  7.0*  7.2*  7.1*   --    --    --    < >   --    < >   --    < >  9.1*   < >   10.8*   LDL  64   --    --    --    --    --    --    --    --   137*   --   78   < >  90   HDL  37*   --    --    --    --    --    --    --    --    --    --   39*   --   37*   TRIG  267*   --    --    --    --    --    --    --    --    --    --   151*   --   171*   ALT  32   --    --   24  25  26   < >  15   < >   --    < >   --    < >  38   CR  0.78   --    --   0.94  0.68  0.72   < >  0.62   < >   --    < >  0.67   < >  0.54   GFRESTIMATED  76   --    --   61  90  83   < >  >90  Non  GFR Calc     < >   --    < >  >90  Non  GFR Calc     < >  >90   GFRESTBLACK  >90   GFR Calc     --    --   74  >90   GFR Calc    >90   GFR Calc     < >  >90   GFR Calc     < >   --    < >  >90   GFR Calc     < >  >90   POTASSIUM  4.3   --    --   4.6  4.1  4.2   < >  3.8   < >   --    < >  4.3   < >  4.4   TSH   --    --    --    --    --   2.24   --   0.93   --    --    < >   --    < >  1.42    < > = values in this interval not displayed.      BP Readings from Last 3 Encounters:   07/13/17 124/70   07/04/17 120/80   06/30/17 108/69    Wt Readings from Last 3 Encounters:   07/13/17 103 kg (227 lb)   07/04/17 102.1 kg (225 lb)   06/27/17 102.1 kg (225 lb)        Labs reviewed in EPIC  Problem list, Medication list, Allergies, and Medical/Social/Surgical histories reviewed in [facility] and updated as appropriate.     ROS: 10 point ROS neg other than the symptoms noted above in the HPI.    This document serves as a record of the services and decisions personally performed and made by Antionette Campo CNP. It was created on her behalf by Oswaldo Lassiter, a trained medical scribe. The creation of this document is based on the provider's statements to the medical scribe.  Oswaldo Lassiter 1:30 PM 7/13/2017    OBJECTIVE:                                                    /70  Pulse 88  Temp 98.2  F (36.8  C) (Oral)  Resp 16   Wt 103 kg (227 lb)  LMP 01/06/2015  SpO2 92%  BMI 42.89 kg/m2  Body mass index is 42.89 kg/(m^2).  GENERAL: healthy, alert, well nourished, well hydrated, no distress  EYES/ENT: PERRL, TM and canals clear bilaterally, no cervical, pre or post auricular nodes or sinus tenderness  Cardiovascular: negative findings:  regular rate and rhythm, S1 normal, S2 normal, no murmur   Respiratory :respiratory rate and rhythm regular, lungs clear to auscultation, no rales, or rhonchi auscultated.   Abdomen: Abdomen soft, non-tender. BS normal. No masses, organomegaly  NEURO: Gait normal. Reflexes normal and symmetric. Sensation grossly WNL  SKIN: no suspicious lesions or rashes     ABDOMEN: Hypoactive bowel sounds in all 4 quads, acute tenderness with deep palpation of right lower quad, no mass identified however right more firm than left lower quad. Central obesity making exam difficult    Mental Status Assessment:  Appearance:   Appropriate   Eye Contact:   Fair   Psychomotor Behavior: constantly shaking legs and hands   Attitude:   Cooperative   Orientation:   All  Speech   Rate / Production: Normal  some slured words    Volume:  Soft   Mood:    Normal  Affect:    Appropriate  Blunted   Thought Content:  Clear  Hallucinations  stable visual hallucinations   Thought Form:  Coherent  Logical   Insight:    Fair   Attention Span and Concentration:  limited  Recent and Remote Memory:  intact  Fund of Knowledge: appropriate  Muscle Strength and Tone: normal   Suicidal Ideation: reports no thoughts, no intention  Hallucination: Yes  Paranoid-Yes  Manic-No  Panic-No  Self harm-No      See [facility] notes     ASSESSMENT/PLAN:                                                    (Z85.42) History of uterine cancer  (primary encounter diagnosis)  Comment: Increased pain consistently over the last month, acute 10/10, history of uterine CA  Plan: CT Abdomen Pelvis w Contrast        Will call with results that require further intervention,  otherwise will send a letter.      (F25.1) Schizoaffective disorder, depressive type (H)  Comment: Haldol dose increased per psychiatrist, now with increased shaking and hand movements  Plan: haloperidol (HALDOL) 5 MG tablet. 10 mg daily        Plan to increase co-gentin and reevaluate, increased to 3 mg total per day.    (R10.31) Abdominal pain, right lower quadrant  Comment: Concern for possible mass vs acute process, unlikely appendix or ovarian.   Plan: CT Abdomen Pelvis w Contrast, UA reflex to         Microscopic and Culture, CBC with platelets and        differential, Basic metabolic panel        Will call with results that require further intervention, otherwise will send a letter.      (G24.01) Tardive dyskinesia  Comment: Increased symptoms with increased dose of haldol  Plan: benztropine (COGENTIN) 1 MG tablet        Increased dose of cogentin to 3 mg daily in divided doses    Patient Instructions:  We will look for the results of the CT scan.      If you get feverish or start vomiting, please go to the emergency room.     I increased the Co-gentin  Take 2 in the morning and 1 tab at night.     We will call with the results of the CT scan.    Greater than 40 minutes was spent face to face with this patient, with greater than 50 % in counselling about Abdominal pain, schizoaffective disorder, tardive dyskinesia.  Conference with Group home/caregiving staff.     The information in this document, created by the medical scribe, Oswaldo Lassiter, for me, accurately reflects the services I personally performed and the decisions made by me. I have reviewed and approved this document for accuracy prior to leaving the patient care area.    ART [facility] PRIMARY [facility]

## 2017-07-13 NOTE — PATIENT INSTRUCTIONS
We will look for the results of the CT scan.      If you get feverish or start vomiting, please go to the emergency room.     I increased the Co-gentin  Take 2 in the morning and 1 tab at night.     We will call with the results of the CT scan.

## 2017-07-13 NOTE — NURSING NOTE
"Chief Complaint   Patient presents with     RECHECK       Initial /70  Pulse 88  Temp 98.2  F (36.8  C) (Oral)  Resp 16  Wt 227 lb (103 kg)  LMP 01/06/2015  SpO2 92%  BMI 42.89 kg/m2 Estimated body mass index is 42.89 kg/(m^2) as calculated from the following:    Height as of 6/30/17: 5' 1\" (1.549 m).    Weight as of this encounter: 227 lb (103 kg).  Medication Reconciliation: complete     Adam Escamilla, TRACEY    "

## 2017-07-14 NOTE — PROGRESS NOTES
"[facility] - Integrated Primary Care   July 13, 2017      Behavioral Health Clinician Progress Note    Patient Name: Nena Tang           Service Type: Individual      Service Location:   Face to Face in Clinic     Session Start Time: 1:28pm  Session End Time: 2:06pm      Session Length: 38 - 52      Attendees: Client and caregiver    Visit Activities (Refresh list every visit): [facility] [employer]    Diagnostic Assessment Date: Not completed  Treatment Plan Review Date: Not completed  See Flowsheets for today's PHQ-9 and TAMIA-7 results  Previous PHQ-9:   PHQ-9 SCORE 12/20/2016 1/2/2017 2/3/2017   Total Score - - -   Total Score 7 0 0     Previous TAMIA-7:   TAMIA-7 SCORE 12/20/2016 1/2/2017 2/3/2017   Total Score - - -   Total Score 6 0 0       ALEXANDRA LEVEL:  ALEXANDRA Score (Last Two) 8/30/2011 6/9/2014   ALEXANDRA Raw Score 42 37   Activation Score 66 49.9   ALEXANDRA Level 3 2       DATA  Extended Session (60+ minutes): No  Interactive Complexity: No  Crisis: No    Treatment Objective(s) Addressed in This Session:  Target Behavior(s): disease management/lifestyle changes Mental Health    Depressed Mood: Increase interest, engagement, and pleasure in doing things  Decrease frequency and intensity of feeling down, depressed, hopeless  Improve quantity and quality of night time sleep / decrease daytime naps  Feel less tired and more energy during the day   Improve diet, appetite, mindful eating, and / or meal planning  Anxiety: will experience a reduction in anxiety, will develop more effective coping skills to manage anxiety symptoms, will develop healthy cognitive patterns and beliefs and will increase ability to function adaptively  Functional Impairment: will effectively address problems that interfere with adaptive functioning    Current Stressors / Issues:    The patient reported that \"the man in the room\" has continued bothering her-this man is a hallucination and the patient has an ambivalent thought process regarding the man " being real and some fear about his presence-she stated that the man can't do her any physical harm but he is able to convince her to do harm to the self and has convinced her to do things that she does not want to do-this writer encouraged the patient stay good on her health and mental health so she is more able to defend against the man-she reported having a recent increase in pain and this pain is in her stomach area she reported that the pain is worse when she is getting up from sleep in the mornings-regarding sleep the patient reported having hard time falling asleep and waking during the night-regarding appetite the patient reported to not have much appetite in the mornings and therefore not eating much in the mornings but that she has good appetite in the afternoon and evening-she reported that the man is still in her room and she actively avoids listening to him-regarding her mood the patient reported feeling stable with no suicidal thoughts-regarding anxiety she continues to feel paranoia regarding the man and worried about her sister who is dealing with some health issues.  The patient reported that she is planing to go to her son's home to help her son by watching his children so he can go to work-she was encouraged to identify a plan of how to stay sober while she is at her son's home-she was given the 2 recommendations: to have all chemicals taken out of the home and to tell her some her commitment to stay sober while she is watching his children.      Progress on Treatment Objective(s) / Homework:  Minimal progress - ACTION (Actively working towards change); Intervened by reinforcing change plan / affirming steps taken    Motivational Interviewing    MI Intervention: Expressed Empathy/Understanding, Supported Autonomy, Collaboration, Evocation, Permission to raise concern or advise, Open-ended questions and Reflections: simple and complex     Change Talk Expressed by the Patient: Desire to change  Ability to change Reasons to change Need to change Committment to change Activation Taking steps    Provider Response to Change Talk: E - Evoked more info from patient about behavior change, A - Affirmed patient's thoughts, decisions, or attempts at behavior change, R - Reflected patient's change talk and S - Summarized patient's change talk statements    Also provided psychoeducation about behavioral health condition, symptoms, and treatment options    Care Plan review completed: No    Medication Review:  See medication list    Medication Compliance:  NA    Changes in Health Issues:   Yes: please refer to PCP note on same day and same time    Chemical Use Review:   Substance Use: Chemical use reviewed, no active concerns identified      Tobacco Use: No current tobacco use.      Assessment: Current Emotional / Mental Status (status of significant symptoms):  Risk status (Self / Other harm or suicidal ideation)  Patient has had a history of suicidal ideation: yes in the past  Patient denies current fears or concerns for personal safety.  Patient denies current or recent suicidal ideation or behaviors.  Patient denies current or recent homicidal ideation or behaviors.  Patient denies current or recent self injurious behavior or ideation.  Patient denies other safety concerns.  A safety and risk management plan has not been developed at this time, however patient was encouraged to call [facility] should there be a change in any of these risk factors.    Appearance:   Appropriate   Eye Contact:   Good   Psychomotor Behavior: Normal   Attitude:   Cooperative   Orientation:   All  Speech   Rate / Production: Normal    Volume:  Normal   Mood:    Normal  Affect:    Appropriate  Bright   Thought Content:  Clear   Thought Form:  Coherent  Logical   Insight:    Fair     Diagnoses:  1. Schizoaffective disorder, depressive type (H)    2. Moderate major depression (H)        Collateral Reports Completed:  Not  Applicable    Plan: (Homework, other):  Patient was given information about behavioral services and encouraged to schedule a follow up appointment with the clinic [facility] as needed.  She was also given information about mental health symptoms and treatment options .  CD Recommendations: Maintain Sobriety.      BIN George, [facility] February 3, 2017

## 2017-07-25 DIAGNOSIS — K59.00 CONSTIPATION, UNSPECIFIED CONSTIPATION TYPE: ICD-10-CM

## 2017-07-25 DIAGNOSIS — E11.3299 TYPE 2 DIABETES MELLITUS WITH MILD NONPROLIFERATIVE RETINOPATHY WITHOUT MACULAR EDEMA, WITH LONG-TERM CURRENT USE OF INSULIN, UNSPECIFIED LATERALITY (H): ICD-10-CM

## 2017-07-25 DIAGNOSIS — K21.9 GASTROESOPHAGEAL REFLUX DISEASE WITHOUT ESOPHAGITIS: ICD-10-CM

## 2017-07-25 DIAGNOSIS — Z79.4 TYPE 2 DIABETES MELLITUS WITH MILD NONPROLIFERATIVE RETINOPATHY WITHOUT MACULAR EDEMA, WITH LONG-TERM CURRENT USE OF INSULIN, UNSPECIFIED LATERALITY (H): ICD-10-CM

## 2017-07-25 NOTE — TELEPHONE ENCOUNTER
polyethylene glycol (MIRALAX/GLYCOLAX) powder      Last Written Prescription Date: 4/12/17  Last Fill Quantity: 527g,  # refills: 3   Last Office Visit with [facility], [facility] or [facility] prescribing provider: 7/13/17                                         Next 5 appointments (look out 90 days)     Jul 31, 2017  9:00 AM CDT   Return Visit with ART Mims CNP   [facility] ([facility])    [address]  [address]  [place] [address]   [phone]                  NOVOLOG FLEXPEN 100 UNIT/ML soln         Last Written Prescription Date: 4/12/17  Last Fill Quantity: 15 ml, # refills: 3  Last Office Visit with [facility], [facility] or [facility] prescribing provider:  7/13/17   Next 5 appointments (look out 90 days)     Jul 31, 2017  9:00 AM CDT   Return Visit with ART Mims CNP   [facility]    [address]  [address]  [place] [address]   [phone]                   BP Readings from Last 3 Encounters:   07/13/17 124/70   07/04/17 120/80   06/30/17 108/69     Lab Results   Component Value Date    MICROL 11 06/27/2017     Lab Results   Component Value Date    UMALCR 6.81 06/27/2017     Creatinine   Date Value Ref Range Status   07/13/2017 0.87 0.52 - 1.04 mg/dL Final   ]  GFR Estimate   Date Value Ref Range Status   07/13/2017 67 >60 mL/min/1.7m2 Final     Comment:     Non  GFR Calc   06/27/2017 76 >60 mL/min/1.7m2 Final     Comment:     Non  GFR Calc   01/24/2017 61 >60 mL/min/1.7m2 Final     Comment:     Non  GFR Calc     GFR Estimate If Black   Date Value Ref Range Status   07/13/2017 81 >60 mL/min/1.7m2 Final     Comment:      GFR Calc   06/27/2017 >90   GFR Calc   >60 mL/min/1.7m2 Final   01/24/2017 74 >60 mL/min/1.7m2 Final     Comment:      GFR Calc     Lab  Results   Component Value Date    CHOL 154 06/27/2017     Lab Results   Component Value Date    HDL 37 06/27/2017     Lab Results   Component Value Date    LDL 64 06/27/2017     Lab Results   Component Value Date    TRIG 267 06/27/2017     Lab Results   Component Value Date    CHOLHDLRATIO 3.8 07/14/2015     Lab Results   Component Value Date    AST 19 06/27/2017     Lab Results   Component Value Date    ALT 32 06/27/2017     Lab Results   Component Value Date    A1C 7.0 06/27/2017    A1C 7.2 03/28/2017    A1C 7.1 01/27/2017    A1C 9.7 11/11/2016    A1C 9.3 08/11/2016     Potassium   Date Value Ref Range Status   07/13/2017 4.2 3.4 - 5.3 mmol/L Final       ranitidine (ZANTAC) 300 MG tablet      Last Written Prescription Date: 6/8/17  Last Fill Quantity: 30,  # refills: 1   Last Office Visit with [facility] prescribing provider: 7/13/17                                         Next 5 appointments (look out 90 days)     Jul 31, 2017  9:00 AM CDT   Return Visit with ART Mims CNP   [facility] ([facility])    [address]  [address]  [facility] [address] [phone]                          Larry Faarax  [facility]

## 2017-07-27 RX ORDER — INSULIN ASPART 100 [IU]/ML
INJECTION, SOLUTION INTRAVENOUS; SUBCUTANEOUS
Qty: 15 ML | Refills: 3 | Status: ON HOLD | OUTPATIENT
Start: 2017-07-27 | End: 2017-09-04

## 2017-07-27 RX ORDER — POLYETHYLENE GLYCOL 3350 17 G/17G
POWDER, FOR SOLUTION ORAL
Qty: 527 G | Refills: 3 | Status: SHIPPED | OUTPATIENT
Start: 2017-07-27 | End: 2018-04-17

## 2017-08-02 ENCOUNTER — OFFICE VISIT (OUTPATIENT)
Dept: OPHTHALMOLOGY | Facility: CLINIC | Age: 57
End: 2017-08-02
Attending: OPHTHALMOLOGY
Payer: MEDICARE

## 2017-08-02 DIAGNOSIS — Z96.1 PSEUDOPHAKIA, BOTH EYES: Primary | ICD-10-CM

## 2017-08-02 PROCEDURE — 99213 OFFICE O/P EST LOW 20 MIN: CPT | Mod: ZF

## 2017-08-02 PROCEDURE — 92015 DETERMINE REFRACTIVE STATE: CPT | Mod: GY,ZF

## 2017-08-02 ASSESSMENT — CUP TO DISC RATIO
OD_RATIO: 0.3
OS_RATIO: 0.3

## 2017-08-02 ASSESSMENT — REFRACTION_MANIFEST
OD_ADD: +2.50
OD_CYLINDER: SPHERE
OD_SPHERE: +0.50
OS_SPHERE: -1.75
OS_ADD: +2.50
OS_AXIS: 080
OS_CYLINDER: +2.00

## 2017-08-02 ASSESSMENT — VISUAL ACUITY
OD_SC: 20/25
OS_PH_SC: 20/40
METHOD: SNELLEN - LINEAR
OS_SC: 20/50

## 2017-08-02 ASSESSMENT — EXTERNAL EXAM - LEFT EYE: OS_EXAM: NORMAL

## 2017-08-02 ASSESSMENT — CONF VISUAL FIELD
OS_NORMAL: 1
METHOD: COUNTING FINGERS
OD_NORMAL: 1

## 2017-08-02 ASSESSMENT — TONOMETRY
OS_IOP_MMHG: 17
IOP_METHOD: TONOPEN
OD_IOP_MMHG: 17

## 2017-08-02 ASSESSMENT — EXTERNAL EXAM - RIGHT EYE: OD_EXAM: NORMAL

## 2017-08-02 ASSESSMENT — SLIT LAMP EXAM - LIDS
COMMENTS: NORMAL
COMMENTS: NORMAL

## 2017-08-02 NOTE — PROGRESS NOTES
\HPI: Nena Tang is a 57 year old female here for follow-up after CE/IOL OS. She has noted the improvement in the left eye and is now interested in right eye surgery.    POHx:  Mild NPDR, both eyes  Myopia, left eye with secondary amblyopia  No trauma/eye surgery  No flomax    Current Eye Medications:   None    Assessment & Plan   (Z96.1) Pseudophakia, both eyes  (primary encounter diagnosis)  Comment: Good post-op appearance right eye  Plan: Given updated glasses Rx.     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 On insulin and oral hyoglycemics. Following with Dr. Chacon.  Plan: Discussed the importance of tight blood glucose control in the prevention of diabetic retinopathy. Recommend yearly dilated eye exam.    (H53.022) Refractive amblyopia, left with (H52.12) Myopia, left eye  Comment: High myopia left eye prior to cataract surgery. Stable  Plan: Observe    Return for follow-up with Dr. Chacon as scheduled.      Teaching statement:  Complete documentation of historical and exam elements from today's encounter can be found in the full encounter summary report (not reduplicated in this progress note). I personally obtained the chief complaint(s) and history of present illness.  I confirmed and edited as necessary the review of systems, past medical/surgical history, family history, social history, and examination findings as documented by others; and I examined the patient myself. I personally reviewed the relevant tests, images, and reports as documented above.     I formulated and edited as necessary the assessment and plan and discussed the findings and management plan with the patient and family.    Tyra Diaz MD  Comprehensive Ophthalmology & Ocular Pathology  Department of Ophthalmology and Visual Neurosciences  [email or web address]  Pager [phone]

## 2017-08-02 NOTE — NURSING NOTE
Chief Complaints and History of Present Illnesses   Patient presents with     Post Op (Ophthalmology) Right Eye     4 week P/O RE IOL     HPI    Affected eye(s):  Right   Symptoms:     No blurred vision   No decreased vision   No floaters   No flashes   Itching      Duration:  4 weeks   Frequency:  Constant       Do you have eye pain now?:  No      Comments:  Pt stated vision has improved over the last 4 weeks EMMANUEL Clifton  2:34 PM August 2, 2017

## 2017-08-02 NOTE — MR AVS SNAPSHOT
After Visit Summary   8/2/2017    Nena Tang    MRN: [id number]           Patient Information     Date Of Birth          1960        Visit Information        Provider Department      8/2/2017 2:00 PM Tyra Diaz MD [facility]        Today's Diagnoses     Pseudophakia, both eyes    -  1       Follow-ups after your visit        Follow-up notes from your care team     Return for follow-up with Dr. Chacon as scheduled.      Your next 10 appointments already scheduled     Nov 15, 2017 10:15 AM CST   RETURN RETINA with Tiburcio Chacon MD   Eye Clinic ([facility])    [address]  [address]  [place] [address] [phone]              Who to contact     Please call your clinic at [phone] to:    Ask questions about your health    Make or cancel appointments    Discuss your medicines    Learn about your test results    Speak to your doctor   If you have compliments or concerns about an experience at your clinic, or if you wish to file a complaint, please contact [facility] Patient Relations at [phone] or email us at [email or web address]         Additional Information About Your Visit        MyChart Information     [employer] gives you secure access to your electronic health record. If you see a primary care provider, you can also send messages to your care team and make appointments. If you have questions, please call your primary care clinic.  If you do not have a primary care provider, please call [phone] and they will assist you.      [employer]ia[employer] is an electronic gateway that provides easy, online access to your medical records. With [employer], you can request a clinic appointment, read your test results, renew a prescription or communicate with your care team.     To access your existing account, please contact your [facility] or call [phone]  for assistance.        Care EveryWhere ID     This is your Care EveryWhere ID. This could be used by other organizations to access your [place] medical records  [id number]        Your Vitals Were     Last Period                   01/06/2015            Blood Pressure from Last 3 Encounters:   07/13/17 124/70   07/04/17 120/80   06/30/17 108/69    Weight from Last 3 Encounters:   07/13/17 103 kg (227 lb)   07/04/17 102.1 kg (225 lb)   06/27/17 102.1 kg (225 lb)              Today, you had the following     No orders found for display       Primary Care Provider Office Phone # Fax #    ART Wilburn [phone]       [facility] [address] [facility] [address]  [facility] [address]        Goals        General    Medical (pt-stated)     Notes - Note created  7/7/2016  9:15 AM by Chelsea Pulido RN    Get blood sugars under control    Improve medication compliance    As of today's date 7/7/2016 goal is met at 0 - 25%.   Goal Status:  Active          Equal Access to Services     [facility]: Hadii samira shane hadso Rios, waaxda luchrissy, qaybta kacastillo catalan, lorena ellison . So [facility] [phone].    ATENCIÓN: Si habla español, tiene a trinh disposición servicios gratuitos de asistencia lingüística. Smith al [phone].    We comply with applicable federal civil rights laws and [place] laws. We do not discriminate on the basis of race, color, national origin, age, disability sex, sexual orientation or gender identity.            Thank you!     Thank you for choosing EYE CLINIC  for your care. Our goal is always to provide you with excellent care. Hearing back from our patients is one way we can continue to improve our services. Please take a few minutes to complete the written survey that you may receive in the mail after your visit with us. Thank you!             Your Updated Medication List - Protect others around you: Learn how to  safely use, store and throw away your medicines at www.disposemymeds.org.          This list is accurate as of: 8/2/17  4:35 PM.  Always use your most recent med list.                   Brand Name Dispense Instructions for use Diagnosis    acetaminophen 500 MG tablet    TYLENOL    100 tablet    Take 2 tablets (1,000 mg) by mouth every 6 hours as needed for pain    Closed fracture of one rib of right side, initial encounter       aspirin 81 MG EC tablet    ASPIRIN LOW DOSE    100 tablet    Take 1 tablet (81 mg) by mouth daily    Coronary artery disease involving native heart with angina pectoris, unspecified vessel or lesion type (H)       atorvastatin 80 MG tablet    LIPITOR    60 tablet    TAKE 1 TABLET (80MG) BY MOUTH DAILY    Hyperlipidemia LDL goal <100       benztropine 1 MG tablet    COGENTIN    90 tablet    2 mg in the morning and 1 mg at night.    Tardive dyskinesia       blood glucose monitoring lancets     1 Box    Use to test blood sugar 2 times daily or as directed.  Ok to substitute alternative if insurance prefers.    Type 2 diabetes mellitus with diabetic neuropathy, with long-term current use of insulin (H)       citalopram 20 MG tablet    celeXA    30 tablet    Take 1 tablet (20 mg) by mouth daily    Schizoaffective disorder, depressive type (H)       DOK PLUS 8.6-50 MG per tablet   Generic drug:  senna-docusate     100 tablet    TAKE 1 TABLET BY MOUTH TWICE A DAY AS NEEDED FOR CONSTIPATION    Constipation, unspecified constipation type       gabapentin 300 MG capsule    NEURONTIN    168 capsule    TAKE 2 CAPSULES (600MG) BY MOUTH THREE TIMES A DAY    Type 2 diabetes mellitus with diabetic neuropathy, with long-term current use of insulin (H)       glucose 4 G Chew chewable tablet     20 tablet    Take 2 every 15 minutes for blood sugar <70mg/dL. Recheck blood sugar every 15 minutes until above 70mg/dL, then eat a substantial meal.    Type 2 diabetes mellitus with mild nonproliferative retinopathy  without macular edema, with long-term current use of insulin, unspecified laterality (H)       haloperidol 5 MG tablet    HALDOL    60 tablet    Take 2 tablets (10 mg) by mouth At Bedtime    Schizoaffective disorder, depressive type (H)       hydrocortisone 1 % ointment     30 g    Apply sparingly to affected area three times daily for 14 days.    Bug bites, initial encounter       ibuprofen 600 MG tablet    ADVIL/MOTRIN    60 tablet    Take 1 tablet (600 mg) by mouth every 4 hours as needed for moderate pain    Closed fracture of one rib of right side, initial encounter       LANTUS SOLOSTAR 100 UNIT/ML injection   Generic drug:  insulin glargine     15 mL    INJECT 45 UNITS SUBCUTANEOUSLY EVERY NIGHT AT BEDTIME    Type 2 diabetes mellitus with both eyes affected by mild nonproliferative retinopathy without macular edema, with long-term current use of insulin (H)       * levofloxacin 0.5 % ophthalmic solution    QUIXIN    2 Bottle    1 drop in surgical eye as directed - 4x daily for 1 week, then stop    Cataract, left       * levofloxacin 0.5 % ophthalmic solution    QUIXIN    2 Bottle    1 drop in surgical eye as directed - 4x daily for 1 week, then stop    Nuclear cataract of right eye       losartan 100 MG tablet    COZAAR    90 tablet    TAKE 1 TABLET (100MG) BY MOUTH DAILY    Coronary artery disease involving native heart with angina pectoris, unspecified vessel or lesion type (H)       melatonin 5 MG Caps     90 capsule    Take 1 capsule by mouth daily At dinnertime    Insomnia, unspecified type       metoprolol 100 MG 24 hr tablet    TOPROL-XL    60 tablet    TAKE 1 TABLET (100MG) BY MOUTH DAILY    Coronary artery disease involving native heart with angina pectoris, unspecified vessel or lesion type (H)       NovoLOG FLEXPEN 100 UNIT/ML injection   Generic drug:  insulin aspart     15 mL    INJECT 20 UNITS SUBCUTANEOUSLY THREE TIMES A DAY (WITH MEALS), INJECT AN EXTRA 7 UNITS ONCE DAILY FOR BLOOD SUGAR OVER  500    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, with long-term current use of insulin, unspecified laterality (H)       ONE TOUCH ULTRA test strip   Generic drug:  blood glucose monitoring     150 strip    TEST TWICE DAILY AS DIRECTED    Type 2 diabetes mellitus with diabetic neuropathy, with long-term current use of insulin (H)       * order for DME     1 Device    Equipment being ordered: Rolling walker    Falls frequently       * order for DME     1 each    Hold Novolog if meals are refused.    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, with long-term current use of insulin, unspecified laterality (H)       polyethylene glycol powder    MIRALAX/GLYCOLAX    527 g    TAKE 17 GRAMS (1 CAPFUL) BY MOUTH DAILY    Constipation, unspecified constipation type       prednisoLONE acetate 1 % ophthalmic susp    PRED FORTE    2 Bottle    1 drop in surgical eye as directed, 4x daily after surgery for 1 week, 3x daily for 1 week, 2x daily for 1 week, daily for 1 week, then stop    Cataract, left       prednisoLONE acetate 1 % ophthalmic susp    PRED FORTE    1 Bottle    1 drop in surgical eye as directed, 4x daily after surgery for 1 week, 3x daily for 1 week, 2x daily for 1 week, daily for 1 week, then stop    Nuclear cataract of right eye       ranitidine 300 MG tablet    ZANTAC    28 tablet    TAKE 1 TABLET (300MG) BY MOUTH AT BEDTIME    Gastroesophageal reflux disease without esophagitis       TRULICITY 1.5 MG/0.5ML pen   Generic drug:  dulaglutide     2 mL    INJECT 1.5MG SUBCUTANEOUSLY EVERY SEVEN DAYS    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, with long-term current use of insulin, unspecified laterality (H)       ULTICARE MINI 31G X 6 MM   Generic drug:  insulin pen needle     100 each    USE 4 DAILY OR AS DIRECTED    Type 2 diabetes mellitus with mild nonproliferative retinopathy  without macular edema, with long-term current use of insulin, unspecified laterality (H)       vitamin D3 55994 UNITS capsule    CHOLECALCIFEROL    4 capsule    TAKE 1 CAPSULE (50,000 UNITS) BY MOUTH ONCE A WEEK    Vitamin D deficiency       * Notice:  This list has 4 medication(s) that are the same as other medications prescribed for you. Read the directions carefully, and ask your doctor or other care provider to review them with you.

## 2017-08-07 DIAGNOSIS — S22.31XA CLOSED FRACTURE OF ONE RIB OF RIGHT SIDE, INITIAL ENCOUNTER: ICD-10-CM

## 2017-08-16 ENCOUNTER — TELEPHONE (OUTPATIENT)
Dept: FAMILY MEDICINE | Facility: CLINIC | Age: 57
End: 2017-08-16

## 2017-08-16 NOTE — TELEPHONE ENCOUNTER
Incoming call from [facility] with [facility], pt was on JEREMIAH on Saturday the Tuesday, but pt came back today she miss all her morning meds: Metoprolol, Lipitor, Gabapentin, Losartan, Aspirin, Cogentin, and Celexa. [facility] is requesting an ok to administer these meds now. (Metoprolol,Lipitor,Losartan,Aspirin,Cogentin,Celexa)  [facility] contact info: [phone]        Giorgi Case

## 2017-08-16 NOTE — TELEPHONE ENCOUNTER
Called and approved for patient to take all medications excluding gabapentin as it is ordered TID.  Rina Norris RN

## 2017-08-22 ENCOUNTER — OFFICE VISIT (OUTPATIENT)
Dept: FAMILY MEDICINE | Facility: CLINIC | Age: 57
End: 2017-08-22
Payer: MEDICARE

## 2017-08-22 ENCOUNTER — TELEPHONE (OUTPATIENT)
Dept: FAMILY MEDICINE | Facility: CLINIC | Age: 57
End: 2017-08-22

## 2017-08-22 VITALS
RESPIRATION RATE: 14 BRPM | TEMPERATURE: 98.5 F | SYSTOLIC BLOOD PRESSURE: 110 MMHG | HEART RATE: 82 BPM | BODY MASS INDEX: 43.36 KG/M2 | OXYGEN SATURATION: 94 % | WEIGHT: 229.5 LBS | DIASTOLIC BLOOD PRESSURE: 58 MMHG

## 2017-08-22 DIAGNOSIS — E11.3299 TYPE 2 DIABETES MELLITUS WITH MILD NONPROLIFERATIVE RETINOPATHY WITHOUT MACULAR EDEMA, WITH LONG-TERM CURRENT USE OF INSULIN, UNSPECIFIED LATERALITY (H): ICD-10-CM

## 2017-08-22 DIAGNOSIS — N30.01 ACUTE CYSTITIS WITH HEMATURIA: Primary | ICD-10-CM

## 2017-08-22 DIAGNOSIS — N94.9 VAGINAL SYMPTOM: ICD-10-CM

## 2017-08-22 DIAGNOSIS — Z79.4 TYPE 2 DIABETES MELLITUS WITH MILD NONPROLIFERATIVE RETINOPATHY WITHOUT MACULAR EDEMA, WITH LONG-TERM CURRENT USE OF INSULIN, UNSPECIFIED LATERALITY (H): ICD-10-CM

## 2017-08-22 DIAGNOSIS — B35.4 TINEA CORPORIS: Primary | ICD-10-CM

## 2017-08-22 DIAGNOSIS — R82.90 NONSPECIFIC FINDING ON EXAMINATION OF URINE: ICD-10-CM

## 2017-08-22 LAB
ALBUMIN UR-MCNC: NEGATIVE MG/DL
APPEARANCE UR: CLEAR
BACTERIA #/AREA URNS HPF: ABNORMAL /HPF
BILIRUB UR QL STRIP: NEGATIVE
COLOR UR AUTO: YELLOW
GLUCOSE UR STRIP-MCNC: NEGATIVE MG/DL
HGB UR QL STRIP: ABNORMAL
KETONES UR STRIP-MCNC: NEGATIVE MG/DL
LEUKOCYTE ESTERASE UR QL STRIP: ABNORMAL
NITRATE UR QL: NEGATIVE
NON-SQ EPI CELLS #/AREA URNS LPF: ABNORMAL /LPF
PH UR STRIP: 5 PH (ref 5–7)
RBC #/AREA URNS AUTO: ABNORMAL /HPF
SOURCE: ABNORMAL
SP GR UR STRIP: 1.02 (ref 1–1.03)
SPECIMEN SOURCE: NORMAL
UROBILINOGEN UR STRIP-ACNC: 0.2 EU/DL (ref 0.2–1)
WBC #/AREA URNS AUTO: ABNORMAL /HPF
WET PREP SPEC: NORMAL

## 2017-08-22 PROCEDURE — 81001 URINALYSIS AUTO W/SCOPE: CPT | Performed by: NURSE PRACTITIONER

## 2017-08-22 PROCEDURE — 87210 SMEAR WET MOUNT SALINE/INK: CPT | Performed by: NURSE PRACTITIONER

## 2017-08-22 PROCEDURE — 99214 OFFICE O/P EST MOD 30 MIN: CPT | Performed by: NURSE PRACTITIONER

## 2017-08-22 PROCEDURE — 87086 URINE CULTURE/COLONY COUNT: CPT | Performed by: NURSE PRACTITIONER

## 2017-08-22 RX ORDER — NITROFURANTOIN 25; 75 MG/1; MG/1
100 CAPSULE ORAL 2 TIMES DAILY
Qty: 14 CAPSULE | Refills: 0 | Status: ON HOLD | OUTPATIENT
Start: 2017-08-22 | End: 2017-08-29

## 2017-08-22 RX ORDER — CLOTRIMAZOLE 1 %
CREAM (GRAM) TOPICAL 2 TIMES DAILY
Qty: 15 G | Refills: 1 | Status: SHIPPED | OUTPATIENT
Start: 2017-08-22 | End: 2017-08-25 | Stop reason: ALTCHOICE

## 2017-08-22 NOTE — PROGRESS NOTES
SUBJECTIVE:                                                    Nena Tang is a 57 year old female who presents to clinic today for the following health issues:    Patient is here today with concerns for a possible UTI.     URINARY TRACT SYMPTOMS  Patient states that she has been scratching, tenderness and having some burning sensation with urination. Patient reports some urine odor, denies fever,  chills or low back pain. Patient states that she has been having increase in urgency and frequency. Patient takes baths once per week, does not take showers.  Patient that she has some vaginal discharge; yellow, itching, and burning with an odor.       Duration: Ongoing 1 week    Description  frequency, urgency and hesitancy    Intensity:  mild, moderate    Accompanying signs and symptoms:  Fever/chills: no   Flank pain no   Nausea and vomiting: no   Vaginal symptoms: odor and itching  Abdominal/Pelvic Pain: no     History  History of frequent UTI's: YES  History of kidney stones: no   Sexually Active: no   Possibility of pregnancy: No    Precipitating or alleviating factors: None  Therapies tried and outcome: none         Diabetes Follow-up  Patient reports blood sugars close to 500 this past week. Patient has higher blood sugars when away from her home. Patient reports that she has higher blood sugars when visiting with her son. Patient also reports increase intake of sugary food when she is feeling depressed.   Patient is checking blood sugars: twice daily.    Blood sugar testing frequency justification: Uncontrolled diabetes  Results are as follows:       am - 113      Diabetic concerns: blood sugar frequently over 200     Symptoms of hypoglycemia (low blood sugar): none     Paresthesias (numbness or burning in feet) or sores: No   Date of last diabetic eye exam: current    Hypertension Follow-up    Outpatient blood pressures are being checked at home.  Results are Normal.    Low Salt Diet: not monitoring  salt        Amount of exercise or physical activity: NONE    Problems taking medications regularly: No    Medication side effects: none  Diet: low salt and diabetic      Mental Health Follow up   Patient goes out to visit her son and stays for a few days and her blood sugars go higher due to increased pop and candy intake. Patient states that she does not blame her son, she reports that she just like visiting with her grand kids, but does not watch what she is eating while she is there. Patient states that she takes full accountability for her decisions.   Patient has been having medication adjustments done by her psychiatrist. She reports some increased depression but she is relating this to the fact that her sister is dying. Patient states that her mood is down a little bit, some increased depression causing increased sugary food intake.  Patient states that she sees a psychiatry once every two weeks and a therapist monthly (Liliana).     Status since last visit: slightly worse.     See PHQ-9 for current symptoms.  Other associated symptoms: None    Complicating factors: none  Significant life event:  No   Current substance abuse:  None  Anxiety or Panic symptoms:  No    Sleep - 6-7 hours, does not use CPAP although has one.   Appetite - was eating healthier, patient has been regressing. Trying to increase water intake.  Exercise - no daily exercise.     Smoking - no  Alcohol - no  Street drugs - no  Marijuana - no  Caffeine - no    PHQ-9  English PHQ-9   Any Language             Problem list and histories reviewed & adjusted, as indicated.  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)     Illiterate     Irritable bowel syndrome     overweight - BMI >35      Takotsubo cardiomyopathy     CAD (coronary artery disease)     Restless legs syndrome (RLS)     CINDY (obstructive sleep apnea)- mild AHI 10.3     Verbal auditory hallucination     Chronic low back pain     Schizoaffective disorder, depressive type (H)     Migraine headache     HTN, goal below 140/90     Health Care Home     Lumbago     Cervicalgia     Cocaine abuse, episodic     Suicidal ideation     Esophageal reflux     Mild nonproliferative diabetic retinopathy (H)     Tardive dyskinesia     Alcohol use     Left cataract     Falls frequently     History of uterine cancer     Depression     Psychophysiological insomnia     Dysuria     Asymptomatic postmenopausal status     Abdominal pain, right lower quadrant     Past Surgical History:   Procedure Laterality Date     C OOPHORECTORMY FOR RAHEL, W/BX  1983    UTERINE     CATARACT IOL, RT/LT Bilateral 2017     COLONOSCOPY N/A 3/16/2017    Procedure: COLONOSCOPY;  Surgeon: Traci Gonzalez MD;  Location:  GI     Coronary CTA  5/21/2014     HYSTERECTOMY  1983    uterine cancer yearly pap's per provider.     LAPAROSCOPIC CHOLECYSTECTOMY  2008     PHACOEMULSIFICATION CLEAR CORNEA WITH STANDARD INTRAOCULAR LENS IMPLANT Left 5/5/2017    Procedure: PHACOEMULSIFICATION CLEAR CORNEA WITH STANDARD INTRAOCULAR LENS IMPLANT;  LEFT EYE PHACOEMULSIFICATION CLEAR CORNEA WITH STANDARD INTRAOCULAR LENS IMPLANT ;  Surgeon: Tyra Diaz MD;  Location:  [facility]     PHACOEMULSIFICATION CLEAR CORNEA WITH STANDARD INTRAOCULAR LENS IMPLANT Right 6/30/2017    Procedure: PHACOEMULSIFICATION CLEAR CORNEA WITH STANDARD INTRAOCULAR LENS IMPLANT;  RIGHT EYE PHACOEMULSIFICATION CLEAR CORNEA WITH STANDARD INTRAOCULAR LENS IMPLANT;  Surgeon: Tyra Diaz MD;  Location:  [facility]     RELEASE TRIGGER FINGER  10/11/2012    Left thumb. Procedure: RELEASE TRIGGER FINGER;  LEFT THUMB TRIGGER RELEASE;  Surgeon: Tay Langley MD;  Location:  [facility]     RELEASE TRIGGER FINGER Right 12/26/2016     Procedure: RELEASE TRIGGER FINGER;  Surgeon: Albino Castañeda MD;  Location: [facility] OR       Social History   Substance Use Topics     Smoking status: Never Smoker     Smokeless tobacco: Never Used     Alcohol use No      Comment: last month     Family History   Problem Relation Age of Onset     CANCER Mother      BLADDER     Respiratory Mother      COPD     GASTROINTESTINAL DISEASE Mother      CIRRHOSIS OF LI BOLIVAR     Alcohol/Drug Mother      DIABETES Mother      Hypertension Mother      Lipids Mother      C.A.D. Mother      Glaucoma Mother      Alcohol/Drug Sister      MENTAL ILLNESS Sister      Alcohol/Drug Sister      Psychotic Disorder Sister      CANCER Maternal Grandmother      UNKNOWN TYPE     CANCER Brother      COLON     Cancer - colorectal Brother      IN HIS LATE 30S     Alcohol/Drug Brother       OF HEROIN OVERDOSE AT AGE 22 YRS     Macular Degeneration No family hx of            Current Outpatient Prescriptions   Medication Sig Dispense Refill     MAPAP 500 MG tablet TAKE 2 TABLETS (1000MG) BY MOUTH EVERY 6 HOURS AS NEEDED FOR PAIN 100 tablet 0     polyethylene glycol (MIRALAX/GLYCOLAX) powder TAKE 17 GRAMS (1 CAPFUL) BY MOUTH DAILY 527 g 3     NOVOLOG FLEXPEN 100 UNIT/ML soln INJECT 20 UNITS SUBCUTANEOUSLY THREE TIMES A DAY (WITH MEALS), INJECT AN EXTRA 7 UNITS ONCE DAILY FOR BLOOD SUGAR OVER 500 15 mL 3     ranitidine (ZANTAC) 300 MG tablet TAKE 1 TABLET (300MG) BY MOUTH AT BEDTIME 28 tablet 3     benztropine (COGENTIN) 1 MG tablet 2 mg in the morning and 1 mg at night. 90 tablet 3     haloperidol (HALDOL) 5 MG tablet Take 2 tablets (10 mg) by mouth At Bedtime 60 tablet 2     ONE TOUCH ULTRA test strip TEST TWICE DAILY AS DIRECTED 150 strip 8     levofloxacin (QUIXIN) 0.5 % ophthalmic solution 1 drop in surgical eye as directed - 4x daily for 1 week, then stop 2 Bottle 1     prednisoLONE acetate (PRED FORTE) 1 % ophthalmic susp 1 drop in surgical eye as directed, 4x daily after surgery for 1  week, 3x daily for 1 week, 2x daily for 1 week, daily for 1 week, then stop 1 Bottle 1     LANTUS SOLOSTAR 100 UNIT/ML soln INJECT 45 UNITS SUBCUTANEOUSLY EVERY NIGHT AT BEDTIME 15 mL 3     hydrocortisone 1 % ointment Apply sparingly to affected area three times daily for 14 days. 30 g 0     vitamin D3 (CHOLECALCIFEROL) 39531 UNITS capsule TAKE 1 CAPSULE (50,000 UNITS) BY MOUTH ONCE A WEEK 4 capsule 3     DOK PLUS 50-8.6 MG per tablet TAKE 1 TABLET BY MOUTH TWICE A DAY AS NEEDED FOR CONSTIPATION 100 tablet 3     losartan (COZAAR) 100 MG tablet TAKE 1 TABLET (100MG) BY MOUTH DAILY 90 tablet 1     atorvastatin (LIPITOR) 80 MG tablet TAKE 1 TABLET (80MG) BY MOUTH DAILY 60 tablet 1     metoprolol (TOPROL-XL) 100 MG 24 hr tablet TAKE 1 TABLET (100MG) BY MOUTH DAILY 60 tablet 1     gabapentin (NEURONTIN) 300 MG capsule TAKE 2 CAPSULES (600MG) BY MOUTH THREE TIMES A  capsule 3     TRULICITY 1.5 MG/0.5ML pen INJECT 1.5MG SUBCUTANEOUSLY EVERY SEVEN DAYS 2 mL 11     levofloxacin (QUIXIN) 0.5 % ophthalmic solution 1 drop in surgical eye as directed - 4x daily for 1 week, then stop 2 Bottle 1     prednisoLONE acetate (PRED FORTE) 1 % ophthalmic susp 1 drop in surgical eye as directed, 4x daily after surgery for 1 week, 3x daily for 1 week, 2x daily for 1 week, daily for 1 week, then stop 2 Bottle 1     ULTICARE MINI 31G X 6 MM insulin pen needle USE 4 DAILY OR AS DIRECTED 100 each 11     aspirin (ASPIRIN LOW DOSE) 81 MG EC tablet Take 1 tablet (81 mg) by mouth daily 100 tablet 4     ibuprofen (ADVIL,MOTRIN) 600 MG tablet Take 1 tablet (600 mg) by mouth every 4 hours as needed for moderate pain 60 tablet 0     melatonin 5 MG CAPS Take 1 capsule by mouth daily At dinnertime 90 capsule 1     glucose 4 G CHEW Take 2 every 15 minutes for blood sugar <70mg/dL. Recheck blood sugar every 15 minutes until above 70mg/dL, then eat a substantial meal. 20 tablet 1     order for DME Hold Novolog if meals are refused. 1 each 0      blood glucose monitoring (ONE TOUCH DELICA) lancets Use to test blood sugar 2 times daily or as directed.  Ok to substitute alternative if insurance prefers. 1 Box prn     citalopram (CELEXA) 20 MG tablet Take 1 tablet (20 mg) by mouth daily 30 tablet 2     order for DME Equipment being ordered: Rolling walker 1 Device 0     Allergies   Allergen Reactions     Imidazole Antifungals Hives     Tolerates diflucan     Ketoprofen Itching     Pruritis to topical     Lisinopril Hives     Metformin Other (See Comments)     Patient hospitalized for lactic acidosis - admitting provider suspectd caused by metformin     Metronidazole Hives     Posaconazole Hives     Tolerates diflucan     Recent Labs   Lab Test  07/13/17   1409  06/27/17   1358  03/28/17   1114  01/27/17   1211  01/24/17   1957  01/22/17   1936  12/29/16   0909   10/25/16   0815   07/13/16   1350   07/14/15   1215   09/03/13   1156   A1C   --   7.0*  7.2*  7.1*   --    --    --    < >   --    < >   --    < >  9.1*   < >  10.8*   LDL   --   64   --    --    --    --    --    --    --    --   137*   --   78   < >  90   HDL   --   37*   --    --    --    --    --    --    --    --    --    --   39*   --   37*   TRIG   --   267*   --    --    --    --    --    --    --    --    --    --   151*   --   171*   ALT   --   32   --    --   24  25  26   < >  15   < >   --    < >   --    < >  38   CR  0.87  0.78   --    --   0.94  0.68  0.72   < >  0.62   < >   --    < >  0.67   < >  0.54   GFRESTIMATED  67  76   --    --   61  90  83   < >  >90  Non  GFR Calc     < >   --    < >  >90  Non  GFR Calc     < >  >90   GFRESTBLACK  81  >90   GFR Calc     --    --   74  >90   GFR Calc    >90   GFR Calc     < >  >90   GFR Calc     < >   --    < >  >90   GFR Calc     < >  >90   POTASSIUM  4.2  4.3   --    --   4.6  4.1  4.2   < >  3.8   < >   --    < >  4.3   < >  4.4    TSH   --    --    --    --    --    --   2.24   --   0.93   --    --    < >   --    < >  1.42    < > = values in this interval not displayed.      BP Readings from Last 3 Encounters:   08/22/17 110/58   07/13/17 124/70   07/04/17 120/80    Wt Readings from Last 3 Encounters:   08/22/17 229 lb 8 oz (104.1 kg)   07/13/17 227 lb (103 kg)   07/04/17 225 lb (102.1 kg)        Labs reviewed in EPIC  Problem list, Medication list, Allergies, and Medical/Social/Surgical histories reviewed in [facility] and updated as appropriate.     ROS: 10 point ROS neg other than the symptoms noted above in the HPI.   OBJECTIVE:                                                    /58  Pulse 82  Temp 98.5  F (36.9  C) (Oral)  Resp 14  Wt 229 lb 8 oz (104.1 kg)  LMP 01/06/2015  SpO2 94%  BMI 43.36 kg/m2  Body mass index is 43.36 kg/(m^2).  GENERAL: healthy, alert, well nourished, well hydrated, no distress  EYES: Eyes grossly normal to inspection, extraocular movements - intact, and PERRL  RESP: lungs clear to auscultation - no rales, no rhonchi, no wheezes  CV: regular rates and rhythm, normal S1 S2, no S3 or S4 and no murmur, no click or rub - no homans or cords  ABDOMEN: soft, no tenderness, no  hepatosplenomegaly, no masses, normal bowel sounds  LYMPHATICS: ant. cervical- normal, post. cervical- normal, axillary- normal, supraclavicular- normal, inguinal- normal  Pelvic Exam: Mild erythema with some scratches to madelaine area.      Mental Status Assessment:  Appearance:   Appropriate   Eye Contact:   Good   Psychomotor Behavior: Normal   Attitude:   Cooperative   Orientation:   All  Speech   Rate / Production: Slow    Volume:  Normal   Mood:    Normal  Affect:    Appropriate   Thought Content:  Clear   Thought Form:  Coherent  Logical   Insight:    Fair   Attention Span and Concentration: appropriate  Recent and Remote Memory:  intact  Fund of Knowledge: appropriate      Diagnostic Test Results:  Results for orders placed or  performed in visit on 08/22/17 (from the past 24 hour(s))   UA with Microscopic reflex to Culture   Result Value Ref Range    Color Urine Yellow     Appearance Urine Clear     Glucose Urine Negative NEG^Negative mg/dL    Bilirubin Urine Negative NEG^Negative    Ketones Urine Negative NEG^Negative mg/dL    Specific Gravity Urine 1.020 1.003 - 1.035    pH Urine 5.0 5.0 - 7.0 pH    Protein Albumin Urine Negative NEG^Negative mg/dL    Urobilinogen Urine 0.2 0.2 - 1.0 EU/dL    Nitrite Urine Negative NEG^Negative    Blood Urine Small (A) NEG^Negative    Leukocyte Esterase Urine Moderate (A) NEG^Negative    Source Midstream Urine     WBC Urine 10-25 (A) OTO2^O - 2 /HPF    RBC Urine 2-5 (A) OTO2^O - 2 /HPF    Squamous Epithelial /LPF Urine Few FEW^Few /LPF    Bacteria Urine Few (A) NEG^Negative /HPF     *Note: Due to a large number of results and/or encounters for the requested time period, some results have not been displayed. A complete set of results can be found in Results Review.        ASSESSMENT/PLAN:                                                    (N30.01) Acute cystitis with hematuria  (primary encounter diagnosis)  Comment: Acute symptoms as noted.   Plan: nitroFURantoin, macrocrystal-monohydrate,         (MACROBID) 100 MG capsule, clotrimazole         (LOTRIMIN) 1 % cream          -Discussed patient taking daily showers or cleaning her madelaine-area before reapplication of clotrimazole cream.     (E11.3299,  Z79.4) 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, with long-term current use of insulin, unspecified laterality (H)  Comment: Ongoing. Discussed above.   Plan: Urine Culture Aerobic Bacterial          (R82.90) Nonspecific finding on examination of urine  Comment: ongoing.   Plan: UA with Microscopic reflex to Culture, Urine         Culture Aerobic Bacterial            (N94.9) Vaginal symptom  Comment: Patient with some discomfort to her madelaine area.   Plan:  "clotrimazole (LOTRIMIN) 1 % cream, Wet prep            Patient Instructions   -Macrobid 100 mg BID x 7days.   -Clotrimazole cream twice daily as needed.   -Call clinic with any questions or concerns.        * BLADDER INFECTION,Female (Adult)    A bladder infection (\"cystitis\" or \"UTI\") usually causes a constant urge to urinate and a burning when passing urine. Urine may be cloudy, smelly or dark. There may be pain in the lower abdomen. A bladder infection occurs when bacteria from the vaginal area enter the bladder opening (urethra). This can occur from sexual intercourse, wearing tight clothing, dehydration and other factors.  HOME CARE:  1. Drink lots of fluids (at least 6-8 glasses a day, unless you must restrict fluids for other medical reasons). This will force the medicine into your urinary system and flush the bacteria out of your body. Cranberry juice has been shown to help clear out the bacteria.  2. Avoid sexual intercourse until your symptoms are gone.  3. A bladder infection is treated with antibiotics. You may also be given Pyridium (generic = phenazopyridine) to reduce the burning sensation. This medicine will cause your urine to become a bright orange color. The orange urine may stain clothing. You may wear a pad or panty-liner to protect clothing.  PREVENTING FUTURE INFECTIONS:  1. Always wipe from front to back after a bowel movement.  2. Keep the genital area clean and dry.  3. Drink plenty of fluids each day to avoid dehydration.  4. Urinate right after intercourse to flush out the bladder.  5. Wear cotton underwear and cotton-lined panty hose; avoid tight-fitting pants.  6. If you are on birth control pills and are having frequent bladder infections, discuss with your doctor.  FOLLOW UP: Return to this facility or see your doctor if ALL symptoms are not gone after three days of treatment.  GET PROMPT MEDICAL ATTENTION if any of the following occur:    Fever over 101 F (38.3 C)    No improvement " by the third day of treatment    Increasing back or abdominal pain    Repeated vomiting; unable to keep medicine down    Weakness, dizziness or fainting    Vaginal discharge    Pain, redness or swelling in the labia (outer vaginal area)    5400-6309 [employer], [address] [place] [address], [place] [address]. All rights reserved. This information is not intended as a substitute for professional medical care. Always follow your healthcare professional's instructions.            [facility] PRIMARY [facility]ARE

## 2017-08-22 NOTE — NURSING NOTE
"Chief Complaint   Patient presents with     UTI     Hypertension     Vaginal Problem     Yeast infection (possible)       Initial /58  Pulse 82  Temp 98.5  F (36.9  C) (Oral)  Resp 14  Wt 229 lb 8 oz (104.1 kg)  LMP 01/06/2015  SpO2 94%  BMI 43.36 kg/m2 Estimated body mass index is 43.36 kg/(m^2) as calculated from the following:    Height as of 6/30/17: 5' 1\" (1.549 m).    Weight as of this encounter: 229 lb 8 oz (104.1 kg).  Medication Reconciliation: complete     Haley Shelby MA      "

## 2017-08-22 NOTE — TELEPHONE ENCOUNTER
Incoming call from [facility] stating that their system shows that pt is allergic to clotrimazole (LOTRIMIN) 1 % cream.  Please follow up. Contact info: [phone], ask for Bar Valenzuela

## 2017-08-22 NOTE — MR AVS SNAPSHOT
After Visit Summary   8/22/2017    Nena Tang    MRN: [id number]           Patient Information     Date Of Birth          1960        Visit Information        Provider Department      8/22/2017 8:30 AM Rowena Weber APRN CNP [facility]        Today's Diagnoses     Acute cystitis with hematuria    -  1    Diabetes mellitus, type 2 (H)        Nonspecific finding on examination of urine          Care Instructions    -Macrobid 100 mg BID x 7days.   -Clotrimazole cream twice daily as needed.   -Call clinic with any questions or concerns.             Follow-ups after your visit        Your next 10 appointments already scheduled     Aug 30, 2017 11:00 AM CDT   SHORT with Kenzie Sheehan   [facility] Primary Care [facility] ([facility])    [address] [place] [address] [address] [place] [id number] [phone]            Nov 15, 2017 10:15 AM CST   RETURN RETINA with Tiburcio Chacon MD   Eye Clinic ([facility])    Kiet Cotto [address]  [facility]  [employer] [address] [phone]              Who to contact     If you have questions or need follow up information about today's clinic visit or your schedule please contact [facility] PRIMARY [facility] directly at [phone].  Normal or non-critical lab and imaging results will be communicated to you by MyChart, letter or phone within 4 business days after the clinic has received the results. If you do not hear from us within 7 days, please contact the clinic through MyChart or phone. If you have a critical or abnormal lab result, we will notify you by phone as soon as possible.  Submit refill requests through [employer] or call your pharmacy and they will forward the refill request to us. Please allow 3 business days for your refill to be completed.          Additional Information About Your Visit         [employer] Information     [employer] gives you secure access to your electronic health record. If you see a primary care provider, you can also send messages to your care team and make appointments. If you have questions, please call your primary care clinic.  If you do not have a primary care provider, please call [phone] and they will assist you.        Care EveryWhere ID     This is your Care EveryWhere ID. This could be used by other organizations to access your [facility] medical records  [id number]        Your Vitals Were     Pulse Temperature Respirations Last Period Pulse Oximetry BMI (Body Mass Index)    82 98.5  F (36.9  C) (Oral) 14 01/06/2015 94% 43.36 kg/m2       Blood Pressure from Last 3 Encounters:   08/22/17 110/58   07/13/17 124/70   07/04/17 120/80    Weight from Last 3 Encounters:   08/22/17 229 lb 8 oz (104.1 kg)   07/13/17 227 lb (103 kg)   07/04/17 225 lb (102.1 kg)              We Performed the Following     UA with Microscopic reflex to Culture     Urine Culture Aerobic Bacterial          Today's Medication Changes          These changes are accurate as of: 8/22/17  9:17 AM.  If you have any questions, ask your nurse or doctor.               Start taking these medicines.        Dose/Directions    clotrimazole 1 % cream   Commonly known as:  LOTRIMIN   Used for:  Acute cystitis with hematuria   Started by:  Rowena Weber APRN CNP        Apply topically 2 times daily   Quantity:  15 g   Refills:  1       nitroFURantoin (macrocrystal-monohydrate) 100 MG capsule   Commonly known as:  MACROBID   Used for:  Acute cystitis with hematuria   Started by:  Rowena Weber APRN CNP        Dose:  100 mg   Take 1 capsule (100 mg) by mouth 2 times daily   Quantity:  14 capsule   Refills:  0            Where to get your medicines      These medications were sent to [address] [address]      Phone:  [phone]     clotrimazole 1 % cream    nitroFURantoin (macrocrystal-monohydrate) 100 MG capsule                Primary Care Provider    None Specified       No primary provider on file.        Goals        General    Medical (pt-stated)     Notes - Note created  7/7/2016  9:15 AM by Chelsea Pulido RN    Get blood sugars under control    Improve medication compliance    As of today's date 7/7/2016 goal is met at 0 - 25%.   Goal Status:  Active          Equal Access to Services     [facility]: Hadii aad ku hadasho Soomaali, waaxda luqadaha, qaybta kaalmada adeegyada, waxay idiin hayaan adeeg kharash laherrera . So [facility] [phone].    ATENCIÓN: Si habla español, tiene a trinh disposición servicios gratuitos de asistencia lingüística. Llame al [phone].    We comply with applicable federal civil rights laws and [place] laws. We do not discriminate on the basis of race, color, national origin, age, disability sex, sexual orientation or gender identity.            Thank you!     Thank you for choosing [facility]  for your care. Our goal is always to provide you with excellent care. Hearing back from our patients is one way we can continue to improve our services. Please take a few minutes to complete the written survey that you may receive in the mail after your visit with us. Thank you!             Your Updated Medication List - Protect others around you: Learn how to safely use, store and throw away your medicines at www.disposemymeds.org.          This list is accurate as of: 8/22/17  9:17 AM.  Always use your most recent med list.                   Brand Name Dispense Instructions for use Diagnosis    aspirin 81 MG EC tablet    ASPIRIN LOW DOSE    100 tablet    Take 1 tablet (81 mg) by mouth daily    Coronary artery disease involving native heart with angina pectoris, unspecified vessel or lesion type (H)       atorvastatin 80 MG tablet    LIPITOR    60 tablet     TAKE 1 TABLET (80MG) BY MOUTH DAILY    Hyperlipidemia LDL goal <100       benztropine 1 MG tablet    COGENTIN    90 tablet    2 mg in the morning and 1 mg at night.    Tardive dyskinesia       blood glucose monitoring lancets     1 Box    Use to test blood sugar 2 times daily or as directed.  Ok to substitute alternative if insurance prefers.    Type 2 diabetes mellitus with diabetic neuropathy, with long-term current use of insulin (H)       citalopram 20 MG tablet    celeXA    30 tablet    Take 1 tablet (20 mg) by mouth daily    Schizoaffective disorder, depressive type (H)       clotrimazole 1 % cream    LOTRIMIN    15 g    Apply topically 2 times daily    Acute cystitis with hematuria       DOK PLUS 8.6-50 MG per tablet   Generic drug:  senna-docusate     100 tablet    TAKE 1 TABLET BY MOUTH TWICE A DAY AS NEEDED FOR CONSTIPATION    Constipation, unspecified constipation type       gabapentin 300 MG capsule    NEURONTIN    168 capsule    TAKE 2 CAPSULES (600MG) BY MOUTH THREE TIMES A DAY    Type 2 diabetes mellitus with diabetic neuropathy, with long-term current use of insulin (H)       glucose 4 G Chew chewable tablet     20 tablet    Take 2 every 15 minutes for blood sugar <70mg/dL. Recheck blood sugar every 15 minutes until above 70mg/dL, then eat a substantial meal.    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    Bug bites, initial encounter       ibuprofen 600 MG tablet    ADVIL/MOTRIN    60 tablet    Take 1 tablet (600 mg) by mouth every 4 hours as needed for moderate pain    Closed fracture of one rib of right side, initial encounter        LANTUS SOLOSTAR 100 UNIT/ML injection   Generic drug:  insulin glargine     15 mL    INJECT 45 UNITS SUBCUTANEOUSLY EVERY NIGHT AT BEDTIME    Type 2 diabetes mellitus with both eyes affected by mild nonproliferative retinopathy without macular edema, with long-term current use of insulin (H)       * levofloxacin 0.5 % ophthalmic solution    QUIXIN    2 Bottle    1 drop in surgical eye as directed - 4x daily for 1 week, then stop    Cataract, left       * levofloxacin 0.5 % ophthalmic solution    QUIXIN    2 Bottle    1 drop in surgical eye as directed - 4x daily for 1 week, then stop    Nuclear cataract of right eye       losartan 100 MG tablet    COZAAR    90 tablet    TAKE 1 TABLET (100MG) BY MOUTH DAILY    Coronary artery disease involving native heart with angina pectoris, unspecified vessel or lesion type (H)       MAPAP 500 MG tablet   Generic drug:  acetaminophen     100 tablet    TAKE 2 TABLETS (1000MG) BY MOUTH EVERY 6 HOURS AS NEEDED FOR PAIN    Closed fracture of one rib of right side, initial encounter       melatonin 5 MG Caps     90 capsule    Take 1 capsule by mouth daily At dinnertime    Insomnia, unspecified type       metoprolol 100 MG 24 hr tablet    TOPROL-XL    60 tablet    TAKE 1 TABLET (100MG) BY MOUTH DAILY    Coronary artery disease involving native heart with angina pectoris, unspecified vessel or lesion type (H)       nitroFURantoin (macrocrystal-monohydrate) 100 MG capsule    MACROBID    14 capsule    Take 1 capsule (100 mg) by mouth 2 times daily    Acute cystitis with hematuria       NovoLOG FLEXPEN 100 UNIT/ML injection   Generic drug:  insulin aspart     15 mL    INJECT 20 UNITS SUBCUTANEOUSLY THREE TIMES A DAY (WITH MEALS), INJECT AN EXTRA 7 UNITS ONCE DAILY FOR BLOOD SUGAR OVER 500    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, with long-term current use of insulin, unspecified laterality (H)       polyethylene glycol powder    MIRALAX/GLYCOLAX    527 g    TAKE 17 GRAMS (1 CAPFUL) BY MOUTH DAILY    Constipation, unspecified constipation type       prednisoLONE acetate 1 % ophthalmic susp    PRED FORTE    2 Bottle    1 drop in surgical eye as directed, 4x daily after surgery for 1 week, 3x daily for 1 week, 2x daily for 1 week, daily for 1 week, then stop    Cataract, left       prednisoLONE acetate 1 % ophthalmic susp    PRED FORTE    1 Bottle    1 drop in surgical eye as directed, 4x daily after surgery for 1 week, 3x daily for 1 week, 2x daily for 1 week, daily for 1 week, then stop    Nuclear cataract of right eye       ranitidine 300 MG tablet    ZANTAC    28 tablet    TAKE 1 TABLET (300MG) BY MOUTH AT BEDTIME    Gastroesophageal reflux disease without esophagitis       TRULICITY 1.5 MG/0.5ML pen   Generic drug:  dulaglutide     2 mL    INJECT 1.5MG SUBCUTANEOUSLY EVERY SEVEN DAYS    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 Read the directions carefully, and ask your doctor or other care provider to review them with you.

## 2017-08-23 LAB
BACTERIA SPEC CULT: NORMAL
SPECIMEN SOURCE: NORMAL

## 2017-08-25 RX ORDER — NAFTIFINE HYDROCHLORIDE 10 MG/G
CREAM TOPICAL DAILY
Qty: 90 G | Refills: 0 | Status: CANCELLED | OUTPATIENT
Start: 2017-08-25

## 2017-08-29 ENCOUNTER — HOSPITAL ENCOUNTER (INPATIENT)
Facility: CLINIC | Age: 57
LOS: 6 days | Discharge: NURSING, REHAB OR LONG-TERM CARE | DRG: 871 | End: 2017-09-04
Attending: EMERGENCY MEDICINE | Admitting: INTERNAL MEDICINE
Payer: MEDICARE

## 2017-08-29 ENCOUNTER — TELEPHONE (OUTPATIENT)
Dept: FAMILY MEDICINE | Facility: CLINIC | Age: 57
End: 2017-08-29

## 2017-08-29 ENCOUNTER — APPOINTMENT (OUTPATIENT)
Dept: GENERAL RADIOLOGY | Facility: CLINIC | Age: 57
DRG: 871 | End: 2017-08-29
Attending: EMERGENCY MEDICINE
Payer: MEDICARE

## 2017-08-29 ENCOUNTER — APPOINTMENT (OUTPATIENT)
Dept: CT IMAGING | Facility: CLINIC | Age: 57
DRG: 871 | End: 2017-08-29
Attending: EMERGENCY MEDICINE
Payer: MEDICARE

## 2017-08-29 DIAGNOSIS — E11.3299 TYPE 2 DIABETES MELLITUS WITH MILD NONPROLIFERATIVE RETINOPATHY WITHOUT MACULAR EDEMA, WITH LONG-TERM CURRENT USE OF INSULIN, UNSPECIFIED LATERALITY (H): ICD-10-CM

## 2017-08-29 DIAGNOSIS — R11.2 NAUSEA AND VOMITING, INTRACTABILITY OF VOMITING NOT SPECIFIED, UNSPECIFIED VOMITING TYPE: ICD-10-CM

## 2017-08-29 DIAGNOSIS — Z79.4 TYPE 2 DIABETES MELLITUS WITH MILD NONPROLIFERATIVE RETINOPATHY WITHOUT MACULAR EDEMA, WITH LONG-TERM CURRENT USE OF INSULIN, UNSPECIFIED LATERALITY (H): ICD-10-CM

## 2017-08-29 DIAGNOSIS — R65.20 SEVERE SEPSIS (H): ICD-10-CM

## 2017-08-29 DIAGNOSIS — I10 HTN, GOAL BELOW 140/90: Primary | ICD-10-CM

## 2017-08-29 DIAGNOSIS — R73.9 HYPERGLYCEMIA: ICD-10-CM

## 2017-08-29 DIAGNOSIS — A41.9 SEVERE SEPSIS (H): ICD-10-CM

## 2017-08-29 DIAGNOSIS — J96.01 ACUTE RESPIRATORY FAILURE WITH HYPOXIA (H): ICD-10-CM

## 2017-08-29 LAB
ALBUMIN SERPL-MCNC: 3.6 G/DL (ref 3.4–5)
ALBUMIN UR-MCNC: NEGATIVE MG/DL
ALP SERPL-CCNC: 119 U/L (ref 40–150)
ALT SERPL W P-5'-P-CCNC: 45 U/L (ref 0–50)
ANION GAP SERPL CALCULATED.3IONS-SCNC: 11 MMOL/L (ref 3–14)
APPEARANCE UR: CLEAR
AST SERPL W P-5'-P-CCNC: 57 U/L (ref 0–45)
BASOPHILS # BLD AUTO: 0 10E9/L (ref 0–0.2)
BASOPHILS NFR BLD AUTO: 0.3 %
BILIRUB SERPL-MCNC: 0.6 MG/DL (ref 0.2–1.3)
BILIRUB UR QL STRIP: NEGATIVE
BUN SERPL-MCNC: 23 MG/DL (ref 7–30)
CALCIUM SERPL-MCNC: 8.9 MG/DL (ref 8.5–10.1)
CHLORIDE SERPL-SCNC: 103 MMOL/L (ref 94–109)
CO2 BLDCOV-SCNC: 24 MMOL/L (ref 21–28)
CO2 SERPL-SCNC: 19 MMOL/L (ref 20–32)
COLOR UR AUTO: YELLOW
CREAT SERPL-MCNC: 0.9 MG/DL (ref 0.52–1.04)
DIFFERENTIAL METHOD BLD: ABNORMAL
EOSINOPHIL # BLD AUTO: 0.3 10E9/L (ref 0–0.7)
EOSINOPHIL NFR BLD AUTO: 2.1 %
ERYTHROCYTE [DISTWIDTH] IN BLOOD BY AUTOMATED COUNT: 13.2 % (ref 10–15)
GFR SERPL CREATININE-BSD FRML MDRD: 65 ML/MIN/1.7M2
GLUCOSE BLDC GLUCOMTR-MCNC: 199 MG/DL (ref 70–99)
GLUCOSE BLDC GLUCOMTR-MCNC: 213 MG/DL (ref 70–99)
GLUCOSE BLDC GLUCOMTR-MCNC: 218 MG/DL (ref 70–99)
GLUCOSE BLDC GLUCOMTR-MCNC: 224 MG/DL (ref 70–99)
GLUCOSE SERPL-MCNC: 223 MG/DL (ref 70–99)
GLUCOSE UR STRIP-MCNC: >499 MG/DL
HCT VFR BLD AUTO: 32.9 % (ref 35–47)
HGB BLD-MCNC: 10.5 G/DL (ref 11.7–15.7)
HGB UR QL STRIP: NEGATIVE
HYALINE CASTS #/AREA URNS LPF: 1 /LPF (ref 0–2)
IMM GRANULOCYTES # BLD: 0.1 10E9/L (ref 0–0.4)
IMM GRANULOCYTES NFR BLD: 0.4 %
KETONES UR STRIP-MCNC: NEGATIVE MG/DL
LACTATE BLD-SCNC: 2.4 MMOL/L (ref 0.7–2)
LACTATE BLD-SCNC: 3.6 MMOL/L (ref 0.7–2.1)
LACTATE SERPL-SCNC: 3 MMOL/L (ref 0.4–2)
LEUKOCYTE ESTERASE UR QL STRIP: NEGATIVE
LYMPHOCYTES # BLD AUTO: 1.3 10E9/L (ref 0.8–5.3)
LYMPHOCYTES NFR BLD AUTO: 9.8 %
MCH RBC QN AUTO: 30.8 PG (ref 26.5–33)
MCHC RBC AUTO-ENTMCNC: 31.9 G/DL (ref 31.5–36.5)
MCV RBC AUTO: 97 FL (ref 78–100)
MONOCYTES # BLD AUTO: 0.8 10E9/L (ref 0–1.3)
MONOCYTES NFR BLD AUTO: 5.7 %
MUCOUS THREADS #/AREA URNS LPF: PRESENT /LPF
NEUTROPHILS # BLD AUTO: 11.1 10E9/L (ref 1.6–8.3)
NEUTROPHILS NFR BLD AUTO: 81.7 %
NITRATE UR QL: NEGATIVE
NRBC # BLD AUTO: 0 10*3/UL
NRBC BLD AUTO-RTO: 0 /100
PCO2 BLDV: 40 MM HG (ref 40–50)
PH BLDV: 7.37 PH (ref 7.32–7.43)
PH UR STRIP: 5 PH (ref 5–7)
PLATELET # BLD AUTO: 214 10E9/L (ref 150–450)
PO2 BLDV: 39 MM HG (ref 25–47)
POTASSIUM SERPL-SCNC: 4.1 MMOL/L (ref 3.4–5.3)
PROT SERPL-MCNC: 7.9 G/DL (ref 6.8–8.8)
RBC # BLD AUTO: 3.41 10E12/L (ref 3.8–5.2)
RBC #/AREA URNS AUTO: <1 /HPF (ref 0–2)
SAO2 % BLDV FROM PO2: 71 %
SODIUM SERPL-SCNC: 133 MMOL/L (ref 133–144)
SOURCE: ABNORMAL
SP GR UR STRIP: 1.01 (ref 1–1.03)
SQUAMOUS #/AREA URNS AUTO: 1 /HPF (ref 0–1)
UROBILINOGEN UR STRIP-MCNC: 0 MG/DL (ref 0–2)
WBC # BLD AUTO: 13.6 10E9/L (ref 4–11)
WBC #/AREA URNS AUTO: 2 /HPF (ref 0–2)

## 2017-08-29 PROCEDURE — 00000146 ZZHCL STATISTIC GLUCOSE BY METER IP

## 2017-08-29 PROCEDURE — A9270 NON-COVERED ITEM OR SERVICE: HCPCS | Mod: GY | Performed by: INTERNAL MEDICINE

## 2017-08-29 PROCEDURE — 87086 URINE CULTURE/COLONY COUNT: CPT | Performed by: EMERGENCY MEDICINE

## 2017-08-29 PROCEDURE — 87040 BLOOD CULTURE FOR BACTERIA: CPT | Performed by: INTERNAL MEDICINE

## 2017-08-29 PROCEDURE — 25000128 H RX IP 250 OP 636: Performed by: INTERNAL MEDICINE

## 2017-08-29 PROCEDURE — 96365 THER/PROPH/DIAG IV INF INIT: CPT

## 2017-08-29 PROCEDURE — 25000132 ZZH RX MED GY IP 250 OP 250 PS 637: Mod: GY | Performed by: INTERNAL MEDICINE

## 2017-08-29 PROCEDURE — 36415 COLL VENOUS BLD VENIPUNCTURE: CPT | Performed by: EMERGENCY MEDICINE

## 2017-08-29 PROCEDURE — 85025 COMPLETE CBC W/AUTO DIFF WBC: CPT | Performed by: EMERGENCY MEDICINE

## 2017-08-29 PROCEDURE — 81001 URINALYSIS AUTO W/SCOPE: CPT | Performed by: EMERGENCY MEDICINE

## 2017-08-29 PROCEDURE — 96375 TX/PRO/DX INJ NEW DRUG ADDON: CPT

## 2017-08-29 PROCEDURE — 83605 ASSAY OF LACTIC ACID: CPT | Performed by: INTERNAL MEDICINE

## 2017-08-29 PROCEDURE — 87040 BLOOD CULTURE FOR BACTERIA: CPT | Performed by: EMERGENCY MEDICINE

## 2017-08-29 PROCEDURE — 25000128 H RX IP 250 OP 636: Performed by: EMERGENCY MEDICINE

## 2017-08-29 PROCEDURE — 83605 ASSAY OF LACTIC ACID: CPT

## 2017-08-29 PROCEDURE — 99285 EMERGENCY DEPT VISIT HI MDM: CPT | Mod: 25

## 2017-08-29 PROCEDURE — 36415 COLL VENOUS BLD VENIPUNCTURE: CPT | Performed by: INTERNAL MEDICINE

## 2017-08-29 PROCEDURE — 80053 COMPREHEN METABOLIC PANEL: CPT | Performed by: EMERGENCY MEDICINE

## 2017-08-29 PROCEDURE — 25000131 ZZH RX MED GY IP 250 OP 636 PS 637: Mod: GY | Performed by: INTERNAL MEDICINE

## 2017-08-29 PROCEDURE — 99223 1ST HOSP IP/OBS HIGH 75: CPT | Mod: AI | Performed by: INTERNAL MEDICINE

## 2017-08-29 PROCEDURE — 71020 XR CHEST 2 VW: CPT

## 2017-08-29 PROCEDURE — 70450 CT HEAD/BRAIN W/O DYE: CPT

## 2017-08-29 PROCEDURE — 12000000 ZZH R&B MED SURG/OB

## 2017-08-29 PROCEDURE — 83605 ASSAY OF LACTIC ACID: CPT | Performed by: EMERGENCY MEDICINE

## 2017-08-29 PROCEDURE — 82803 BLOOD GASES ANY COMBINATION: CPT

## 2017-08-29 PROCEDURE — 96361 HYDRATE IV INFUSION ADD-ON: CPT

## 2017-08-29 RX ORDER — AMOXICILLIN 250 MG
1 CAPSULE ORAL 2 TIMES DAILY PRN
COMMUNITY
End: 2018-06-08

## 2017-08-29 RX ORDER — METOPROLOL SUCCINATE 100 MG/1
100 TABLET, EXTENDED RELEASE ORAL DAILY
Status: DISCONTINUED | OUTPATIENT
Start: 2017-08-30 | End: 2017-08-31

## 2017-08-29 RX ORDER — NICOTINE POLACRILEX 4 MG
15-30 LOZENGE BUCCAL
Status: DISCONTINUED | OUTPATIENT
Start: 2017-08-29 | End: 2017-09-04 | Stop reason: HOSPADM

## 2017-08-29 RX ORDER — CEFTRIAXONE 1 G/1
1 INJECTION, POWDER, FOR SOLUTION INTRAMUSCULAR; INTRAVENOUS ONCE
Status: COMPLETED | OUTPATIENT
Start: 2017-08-29 | End: 2017-08-29

## 2017-08-29 RX ORDER — AZITHROMYCIN 250 MG/1
500 TABLET, FILM COATED ORAL ONCE
Status: DISCONTINUED | OUTPATIENT
Start: 2017-08-29 | End: 2017-08-29

## 2017-08-29 RX ORDER — BISACODYL 5 MG
5-15 TABLET, DELAYED RELEASE (ENTERIC COATED) ORAL DAILY PRN
Status: DISCONTINUED | OUTPATIENT
Start: 2017-08-29 | End: 2017-09-01

## 2017-08-29 RX ORDER — PROCHLORPERAZINE 25 MG
25 SUPPOSITORY, RECTAL RECTAL EVERY 12 HOURS PRN
Status: DISCONTINUED | OUTPATIENT
Start: 2017-08-29 | End: 2017-09-04 | Stop reason: HOSPADM

## 2017-08-29 RX ORDER — GABAPENTIN 400 MG/1
400 CAPSULE ORAL 3 TIMES DAILY
Status: DISCONTINUED | OUTPATIENT
Start: 2017-08-29 | End: 2017-09-04 | Stop reason: HOSPADM

## 2017-08-29 RX ORDER — IBUPROFEN 600 MG/1
600 TABLET, FILM COATED ORAL EVERY 4 HOURS PRN
Status: DISCONTINUED | OUTPATIENT
Start: 2017-08-29 | End: 2017-09-04 | Stop reason: HOSPADM

## 2017-08-29 RX ORDER — ONDANSETRON 2 MG/ML
4 INJECTION INTRAMUSCULAR; INTRAVENOUS EVERY 6 HOURS PRN
Status: DISCONTINUED | OUTPATIENT
Start: 2017-08-29 | End: 2017-09-04 | Stop reason: HOSPADM

## 2017-08-29 RX ORDER — ONDANSETRON 2 MG/ML
4 INJECTION INTRAMUSCULAR; INTRAVENOUS
Status: DISCONTINUED | OUTPATIENT
Start: 2017-08-29 | End: 2017-08-29

## 2017-08-29 RX ORDER — LOSARTAN POTASSIUM 100 MG/1
100 TABLET ORAL DAILY
Status: DISCONTINUED | OUTPATIENT
Start: 2017-08-30 | End: 2017-09-04 | Stop reason: HOSPADM

## 2017-08-29 RX ORDER — ASPIRIN 81 MG/1
81 TABLET ORAL DAILY
Status: DISCONTINUED | OUTPATIENT
Start: 2017-08-30 | End: 2017-09-04 | Stop reason: HOSPADM

## 2017-08-29 RX ORDER — NALOXONE HYDROCHLORIDE 0.4 MG/ML
.1-.4 INJECTION, SOLUTION INTRAMUSCULAR; INTRAVENOUS; SUBCUTANEOUS
Status: DISCONTINUED | OUTPATIENT
Start: 2017-08-29 | End: 2017-09-04 | Stop reason: HOSPADM

## 2017-08-29 RX ORDER — ACETAMINOPHEN 500 MG
1000 TABLET ORAL ONCE
Status: DISCONTINUED | OUTPATIENT
Start: 2017-08-29 | End: 2017-08-29

## 2017-08-29 RX ORDER — ACETAMINOPHEN 325 MG/1
650 TABLET ORAL EVERY 4 HOURS PRN
Status: DISCONTINUED | OUTPATIENT
Start: 2017-08-29 | End: 2017-09-04 | Stop reason: HOSPADM

## 2017-08-29 RX ORDER — PROCHLORPERAZINE MALEATE 5 MG
5-10 TABLET ORAL EVERY 6 HOURS PRN
Status: DISCONTINUED | OUTPATIENT
Start: 2017-08-29 | End: 2017-09-04 | Stop reason: HOSPADM

## 2017-08-29 RX ORDER — ONDANSETRON 4 MG/1
4 TABLET, ORALLY DISINTEGRATING ORAL EVERY 6 HOURS PRN
Status: DISCONTINUED | OUTPATIENT
Start: 2017-08-29 | End: 2017-09-04 | Stop reason: HOSPADM

## 2017-08-29 RX ORDER — LIDOCAINE 40 MG/G
CREAM TOPICAL
Status: DISCONTINUED | OUTPATIENT
Start: 2017-08-29 | End: 2017-09-04 | Stop reason: HOSPADM

## 2017-08-29 RX ORDER — DEXTROSE MONOHYDRATE 25 G/50ML
25-50 INJECTION, SOLUTION INTRAVENOUS
Status: DISCONTINUED | OUTPATIENT
Start: 2017-08-29 | End: 2017-09-04 | Stop reason: HOSPADM

## 2017-08-29 RX ORDER — ATORVASTATIN CALCIUM 40 MG/1
80 TABLET, FILM COATED ORAL DAILY
Status: DISCONTINUED | OUTPATIENT
Start: 2017-08-30 | End: 2017-09-04 | Stop reason: HOSPADM

## 2017-08-29 RX ORDER — SODIUM CHLORIDE, SODIUM LACTATE, POTASSIUM CHLORIDE, CALCIUM CHLORIDE 600; 310; 30; 20 MG/100ML; MG/100ML; MG/100ML; MG/100ML
INJECTION, SOLUTION INTRAVENOUS CONTINUOUS
Status: ACTIVE | OUTPATIENT
Start: 2017-08-29 | End: 2017-08-30

## 2017-08-29 RX ORDER — HALOPERIDOL 5 MG/1
5 TABLET ORAL AT BEDTIME
Status: DISCONTINUED | OUTPATIENT
Start: 2017-08-29 | End: 2017-09-04 | Stop reason: HOSPADM

## 2017-08-29 RX ORDER — BISACODYL 10 MG
10 SUPPOSITORY, RECTAL RECTAL DAILY PRN
Status: DISCONTINUED | OUTPATIENT
Start: 2017-08-29 | End: 2017-09-04 | Stop reason: HOSPADM

## 2017-08-29 RX ORDER — BENZTROPINE MESYLATE 1 MG/1
1 TABLET ORAL 2 TIMES DAILY
Status: DISCONTINUED | OUTPATIENT
Start: 2017-08-29 | End: 2017-09-04 | Stop reason: HOSPADM

## 2017-08-29 RX ORDER — AMOXICILLIN 250 MG
1-2 CAPSULE ORAL 2 TIMES DAILY
Status: DISCONTINUED | OUTPATIENT
Start: 2017-08-29 | End: 2017-09-01

## 2017-08-29 RX ORDER — SODIUM CHLORIDE 9 MG/ML
1000 INJECTION, SOLUTION INTRAVENOUS CONTINUOUS
Status: DISCONTINUED | OUTPATIENT
Start: 2017-08-29 | End: 2017-08-29

## 2017-08-29 RX ADMIN — HALOPERIDOL 5 MG: 5 TABLET ORAL at 21:48

## 2017-08-29 RX ADMIN — INSULIN ASPART 2 UNITS: 100 INJECTION, SOLUTION INTRAVENOUS; SUBCUTANEOUS at 19:39

## 2017-08-29 RX ADMIN — INSULIN GLARGINE 50 UNITS: 100 INJECTION, SOLUTION SUBCUTANEOUS at 21:49

## 2017-08-29 RX ADMIN — GABAPENTIN 400 MG: 400 CAPSULE ORAL at 21:48

## 2017-08-29 RX ADMIN — ENOXAPARIN SODIUM 40 MG: 40 INJECTION SUBCUTANEOUS at 19:13

## 2017-08-29 RX ADMIN — Medication 2 LOZENGE: at 22:24

## 2017-08-29 RX ADMIN — RANITIDINE 300 MG: 150 TABLET ORAL at 21:48

## 2017-08-29 RX ADMIN — SODIUM CHLORIDE 1000 ML: 9 INJECTION, SOLUTION INTRAVENOUS at 13:39

## 2017-08-29 RX ADMIN — BENZTROPINE MESYLATE 1 MG: 1 TABLET ORAL at 21:48

## 2017-08-29 RX ADMIN — SODIUM CHLORIDE, POTASSIUM CHLORIDE, SODIUM LACTATE AND CALCIUM CHLORIDE 1500 ML: 600; 310; 30; 20 INJECTION, SOLUTION INTRAVENOUS at 17:46

## 2017-08-29 RX ADMIN — ONDANSETRON 4 MG: 2 INJECTION INTRAMUSCULAR; INTRAVENOUS at 16:06

## 2017-08-29 RX ADMIN — CEFTRIAXONE SODIUM 1 G: 1 INJECTION, POWDER, FOR SOLUTION INTRAMUSCULAR; INTRAVENOUS at 15:36

## 2017-08-29 RX ADMIN — Medication 5 MG: at 21:48

## 2017-08-29 RX ADMIN — SODIUM CHLORIDE 1000 ML: 9 INJECTION, SOLUTION INTRAVENOUS at 14:03

## 2017-08-29 RX ADMIN — AZITHROMYCIN MONOHYDRATE 500 MG: 500 INJECTION, POWDER, LYOPHILIZED, FOR SOLUTION INTRAVENOUS at 17:45

## 2017-08-29 ASSESSMENT — ENCOUNTER SYMPTOMS
VOMITING: 1
DIZZINESS: 1
CONFUSION: 1
DIARRHEA: 1
COUGH: 0
FEVER: 0

## 2017-08-29 ASSESSMENT — ACTIVITIES OF DAILY LIVING (ADL)
RETIRED_COMMUNICATION: 0-->UNDERSTANDS/COMMUNICATES WITHOUT DIFFICULTY
WHICH_OF_THE_ABOVE_FUNCTIONAL_RISKS_HAD_A_RECENT_ONSET_OR_CHANGE?: AMBULATION
COGNITION: 1 - ATTENTION OR MEMORY DEFICITS
RETIRED_EATING: 0-->INDEPENDENT
SWALLOWING: 0-->SWALLOWS FOODS/LIQUIDS WITHOUT DIFFICULTY

## 2017-08-29 NOTE — ED NOTES
Pt reports that she had diarrhea yesterday. This morning she refused to eat per caregiver, he states that BG was 160 this morning with no food. Pt began vomiting about noon. Caregiver states that the pt had bowel incontinence in the car on the way here. Pt is oriented to self, but cannot tell me the date, situation, or where she is except for that she is in the hospital. Pt is drowsy, but arouseable and responsive. ABC intact.

## 2017-08-29 NOTE — IP AVS SNAPSHOT
` ` Patient Information     Patient Name Sex Nena Taylor ([id number]) Female 1960       Room Bed    [address] [place] [address]      Patient Demographics     Address Phone E-mail Address    [address] [place] [address] [phone] (Home)  NONE (Work)  [phone] (Mobile) *Preferred* [email or web address]      Patient Ethnicity & Race     Ethnic Group Patient Race    American       Emergency Contact(s)     Name Relation Home Work Mobile    Scot Vogt [phone] none [phone]      Documents on File        Status Date Received Description       Documents for the Patient    Insurance Card Received () 09     Face Sheet  () 09     Insurance Card  () 09     External Medication Information Consent Accepted () 09     Face Sheet Received () 09     Face Sheet Received () 06/14/10     Patient ID Received () 16     Consent for Services - Hospital/Clinic Received () 01/10/11     Consent for Services - Hospital/Clinic Received () 12/29/10     External Medication Information Consent Accepted () 01/10/11     Consent for Services - Hospital/Clinic Received () 11     Insurance Card Received () 11     Privacy Notice - [place] Received 11     Insurance Card Received () 10/08/11 Medicaid    CMS IM for Patient Signature Received 10/02/13     External Medication Information Consent Accepted () 12     Consent for Services - Hospital/Clinic  () 03/15/12 CONSENT FOR SERVICES - CLINIC AND HOD    Consent for Services - Hospital/Clinic Received () 12     Consent for Services - Hospital/Clinic Received () 10/11/12     External Medication Information Consent Accepted 13     Insurance Card Received 17 Medicare A&B     Consent for EHR Access  13 Copied from existing Consent for services -  C/HOD collected on 10/11/2012    HIM MUSA Authorization - File Only   Zoe Davenport MUSA [id number]    [facility] Specified Other Received () 14 bor    HIM MUSA Authorization - File Only  09/10/13 REX PEREZ    Consent for Services - Hospital/Clinic Received () 13     Consent to Communicate Received 13     HIM MUSA Authorization  13     Consent for EHR Access Received 10/02/13     Consent for Services - Hospital/Clinic Received () 10/02/13     Privacy Notice - Jey  10/16/13 ACKNOWLEDGMENT OF RECEIPT OF NOTICE OF PRIVACY PRACTICE    HIM MUSA Authorization - File Only  10/18/13 IVON BURNHAM    HIM MUSA Authorization  14 [facility]    HIM MUSA Authorization - File Only   14 MUSA [employer]    HIM MUSA Authorization - File Only   14 M[facility]    Consent to Communicate  ()  14 Auth to Discuss PHI [employer]    HIM MUSA Authorization  14 [employer]    HIM MUSA Authorization - File Only   Thao Ernandez, MUSA, 14    Immunization Record   [place] TB Skin Test Record Form 07/10/14    HIM MUSA Authorization - File Only   Thao Ernandez 14    HIM MUSA Authorization - File Only   shaila morgan assoc request    HIM MUSA Authorization - File Only   Thao Ernandez 2014    Consent for Services - Hospital/Clinic Received () 14     HIM MUSA Authorization - File Only  14 DEC RELEASE OF INFORMATION    HIM MUSA Authorization - File Only  01/06/15 [facility]    HIM MUSA Authorization - File Only  01/06/15 [facility]    HIM MUSA Authorization  01/23/15 [facility]    Consent to Communicate  ()  03/02/15 Auth to Discuss PHI Bar Valiente    HIM MUSA Authorization - File Only  03/08/15 DEC RELEASE OF INFORMATION    HIM MUSA Authorization - File Only  03/24/15 ROSSI NEELY- [facility].    [facility] MUSA Authorization - File Only   03/24/15 HUMAIRACHRIS BURNHAM    HIM MUSA Authorization  04/02/15 [facility]    HIM MUSA Authorization - File Only  06/17/15 DEC RELEASE OF INFORMATION    HIM MUSA Authorization - File Only  07/03/15 [facility]    [facility] MUSA Authorization - File Only  07/03/15 [facility] -ROSSI NEELY    HIM MUSA Authorization - File Only  07/03/15 [facility]    Consent to Communicate  09/02/15 AUTH TO DISCUSS PHI ADELITA LAINEZ    HIM MUSA Authorization  10/01/15 [facility]    Consent for Services/Privacy Notice - Hospital/Clinic Received () 16     HIM MUSA Authorization - File Only  02/15/16 DEC RELEASE OF INFORMATION    HIM MUSA Authorization - File Only  [facility] MUSA Authorization - File Only  16 [facility]    Business/Insurance/Care Coordination/Health Form - Patient  16 [employer] DENIAL OF COVERAGE ON ONE TOUCH ULTRA TEST STRIPS 16    Patient ID Received 17 [place] ID EX [id number]    Insurance Card Received 17 [facility]     [facility] MUSA Authorization - File Only  16 [place] [facility]    Consent to Communicate  () 16 AUTHORIZATION TO DISCUSS PROTECTED HEALTH INFORMATION    Consent for Services - [facility]       Consent to Communicate  () 16 AUTHORIZATION TO DISCUSS PROTECTED HEALTH INFORMATION, 16    HIM MUSA Authorization - File Only  10/24/16 DEC RELEASE OF INFORMATION    [facility] Specified Other Received 16 not needed, refer to CFS     HIM MUSA Authorization - File Only  16 DEC RELEASE OF INFORMATION    HIM MUSA Authorization - File Only  17 [facility]    Consent for Services/Privacy Notice - Hospital/Clinic Received 17     HIM MUSA Authorization  17 [facility] [employer] [facility]/ [facility] MUSA Authorization - File Only  17 [facility]- MUSA    [facility] MUSA Authorization - File Only  17 EMY  ADULT DAY PROGRAM[facility]    HIM MUSA Authorization - File Only  05/19/17 [facility])    HIM MUSA Authorization - File Only  05/19/17 HIM MUSA AUTHORIZATION-FILE ONLY    HIM MUSA Authorization - File Only  05/19/17 EDUARDA HARTLEY-[facility]     HIM MUSA Authorization - File Only  05/19/17 [facility]    HIM MUSA Authorization - File Only  05/19/17 HIM MUSA AUTHORIZATION-FILE ONLY    HIM MUSA Authorization - File Only  05/19/17 [employer]    HIM MUSA Authorization  06/08/17 [facility]    Consent to Communicate Received 06/28/17     HIM MUSA Authorization - File Only  07/01/17 [facility] - MUSA    Privacy Notice - [place] Received (Deleted) 01/22/09     Consent for Services/Privacy Notice - Hospital/Clinic-Esign  (Deleted)         Documents for the Encounter    CMS IM for Patient Signature Received 08/31/17     CMS IM for Patient Signature Received 09/04/17 2nd IMM Given       Admission Information     Attending Provider Admitting Provider Admission Type Admission Date/Time    Belgica Hall MD Dorn, Karen T, MD Emergency 08/29/17  1309    Discharge Date Hospital Service Auth/Cert Status Service Area     General Medicine [facility] SERVICES    Unit Room/Bed Admission Status       [facility] MEDICAL SURGICAL 0502/0502-01 Admission (Confirmed)       Admission     Complaint    Sepsis (H)      Hospital Account     Name Acct ID Class Status Primary Coverage    Nena Tang [id number] Inpatient Open MEDICARE - MEDICARE            Guarantor Account (for Hospital Account #[id number])     Name Relation to Pt Service Area Active? Acct Type    Nena Tang  FCS Yes Personal/Family    Address Phone          [address] [place] [address] [phone](H)  none(O)              Coverage Information (for Hospital Account #[id number])     1. MEDICARE/MEDICARE     F/O  Payor/Plan Precert #    MEDICARE/MEDICARE     Subscriber Subscriber #    HomesteadAniaNena D [id number]    Address Phone    [employer] CLAIMS  [address]  [place] [address] [phone]          2. MEDICA/MEDICA [place]ESS [place]     F/O Payor/Plan Precert #    MEDICA/MEDICA [place]ESS [place]ILITY [place]     Subscriber Subscriber #    KittyNena [id number]    Address Phone    [address] [address]  [place] [address] [phone]

## 2017-08-29 NOTE — ED NOTES
[facility]  ED Nurse Handoff Report    Nena Tang is a 57 year old female   ED Chief complaint: Nausea, Vomiting, & Diarrhea  . ED Diagnosis:   Final diagnoses:   Severe sepsis (H)   Acute respiratory failure with hypoxia (H)   Nausea and vomiting, intractability of vomiting not specified, unspecified vomiting type     Allergies:   Allergies   Allergen Reactions     Imidazole Antifungals Hives     Tolerates diflucan     Ketoprofen Itching     Pruritis to topical     Lisinopril Hives     Metformin Other (See Comments)     Patient hospitalized for lactic acidosis - admitting provider suspectd caused by metformin     Metronidazole Hives     Posaconazole Hives     Tolerates diflucan       Code Status: Full Code  Activity level - Baseline/Home:  Stand with Assist. Activity Level - Current:   Stand with Assist of 2. Lift room needed: No. Bariatric: No   Needed: No   Isolation: No. Infection: Not Applicable.     Vital Signs:   Vitals:    08/29/17 1414 08/29/17 1500 08/29/17 1515 08/29/17 1519   BP:    149/69   Pulse:       Resp:       Temp:       TempSrc:       SpO2: 100% 99% 98% 98%   Weight:       Height:           Cardiac Rhythm:  ,      Pain level: 0-10 Pain Scale: 0  Patient confused: Yes. Patient Falls Risk: Yes.   Elimination Status: Has voided   Patient Report - Initial Complaint: nausea, vomiting. Focused Assessment:  Pt reports that she had diarrhea yesterday. This morning she refused to eat per caregiver, he states that BG was 160 this morning with no food. Pt began vomiting about noon. Caregiver states that the pt had bowel incontinence in the car on the way here. Pt is oriented to self, but cannot tell me the date, situation, or where she is except for that she is in the hospital. Pt is drowsy, but arouseable and responsive. ABC intact. Pt had large amount of brown sputum coughed up. Pt vomited right after zithromax, tylenol admin. Pt is on O2 at 2L nasal cannula  Tests  Performed: CT, xray, labs. Abnormal Results:   Labs Ordered and Resulted from Time of ED Arrival Up to the Time of Departure from the ED   CBC WITH PLATELETS DIFFERENTIAL - Abnormal; Notable for the following:        Result Value    WBC 13.6 (*)     RBC Count 3.41 (*)     Hemoglobin 10.5 (*)     Hematocrit 32.9 (*)     Absolute Neutrophil 11.1 (*)     All other components within normal limits   COMPREHENSIVE METABOLIC PANEL - Abnormal; Notable for the following:     Carbon Dioxide 19 (*)     Glucose 223 (*)     AST 57 (*)     All other components within normal limits   ROUTINE UA WITH MICROSCOPIC - Abnormal; Notable for the following:     Glucose Urine >499 (*)     Mucous Urine Present (*)     All other components within normal limits   GLUCOSE BY METER - Abnormal; Notable for the following:     Glucose 224 (*)     All other components within normal limits   LACTIC ACID - Abnormal; Notable for the following:     Lactic Acid 3.0 (*)     All other components within normal limits   ISTAT  GASES LACTATE DOMINGO POCT - Abnormal; Notable for the following:     Lactic Acid 3.6 (*)     All other components within normal limits   ISTAT CG4 GASES LACTATE DOMINGO NURSING POCT   IV ACCESS   URINE CULTURE AEROBIC BACTERIAL   BLOOD CULTURE   BLOOD CULTURE   .   Treatments provided: fluid bolus, antibiotics, O2 2L Nasal cannula  Family Comments: pt comes from [facility]. Brought in by caregiver  OBS brochure/video discussed/provided to patient:  N/A  ED Medications:   Medications   0.9% sodium chloride BOLUS (0 mLs Intravenous Stopped 8/29/17 1403)     Followed by   0.9% sodium chloride infusion (not administered)   cefTRIAXone (ROCEPHIN) 1 g vial to attach to  mL bag for ADULTS or NS 50 mL bag for PEDS (1 g Intravenous New Bag 8/29/17 1536)   acetaminophen (TYLENOL) tablet 1,000 mg (1,000 mg Oral Not Given 8/29/17 1540)   0.9% sodium chloride BOLUS (not administered)   azithromycin (ZITHROMAX) tablet 500 mg (500 mg Oral Not Given  8/29/17 1539)   0.9% sodium chloride BOLUS (1,000 mLs Intravenous New Bag 8/29/17 1403)     Drips infusing:  Yes  For the majority of the shift this patient was Green. Interventions performed were frequent rounding.     Severe Sepsis OR Septic Shock Diagnosis Present: No      ED Nurse Name/Phone Number: Antonella Tate,   3:52 PM  RECEIVING UNIT ED HANDOFF REVIEW    Above ED Nurse Handoff Report was reviewed: Yes  Reviewed by: Chanell Quijano on August 29, 2017 at 4:47 PM

## 2017-08-29 NOTE — IP AVS SNAPSHOT
"[place] [address] MEDICAL SURGICAL: [phone]                                              INTERAGENCY TRANSFER FORM - PHYSICIAN ORDERS   2017                    Hospital Admission Date: 2017  ERIK BURNHAM   : 1960  Sex: Female        Attending Provider: Belgica Hall MD     Allergies:  Imidazole Antifungals, Ketoprofen, Lisinopril, Metformin, Metronidazole, Posaconazole    Infection:  None   Service:  GENERAL [facility]    Ht:  1.549 m (5' 1\")   Wt:  101.8 kg (224 lb 6.4 oz)   Admission Wt:  104.1 kg (229 lb 8 oz)    BMI:  42.4 kg/m 2   BSA:  2.09 m 2            Patient PCP Information     None on File      ED Clinical Impression     Diagnosis Description Comment Added By Time Added    Severe sepsis (H) [A41.9, R65.20] Severe sepsis (H) [A41.9, R65.20]  Bar Alberts MD 2017  3:29 PM    Acute respiratory failure with hypoxia (H) [J96.01] Acute respiratory failure with hypoxia (H) [J96.01]  Bar Alberts MD 2017  3:29 PM    Nausea and vomiting, intractability of vomiting not specified, unspecified vomiting type [R11.2] Nausea and vomiting, intractability of vomiting not specified, unspecified vomiting type [R11.2]  Bar Alberts MD 2017  3:29 PM    Hyperglycemia [R73.9] Hyperglycemia [R73.9]  Bar Alberts MD 2017  4:41 PM      Hospital Problems as of 2017              Priority Class Noted POA    Type 2 diabetes mellitus with mild nonproliferative retinopathy (H) Medium  2011 Yes    overweight - BMI >35 Low  Unknown Yes    Restless legs syndrome (RLS) Low  2012 Yes    Schizoaffective disorder, depressive type (H) Medium  2013 Yes    Sepsis (H) Medium  2017 Yes    Pneumonia of right lower lobe due to infectious organism (H) Medium  2017 Unknown    Infectious encephalopathy Medium  2017 Unknown    Non-intractable vomiting with nausea Medium  2017 Unknown    Acute respiratory failure with hypoxia (H) Medium  " 9/4/2017 Unknown      Non-Hospital Problems as of 9/4/2017              Priority Class Noted    Osteopenia Low  10/7/2009    Vitamin B12 deficiency without anemia Low  11/23/2009    Hyperlipidemia LDL goal <100 Medium  10/31/2010    Moderate major depression (H) High  6/8/2011    Rotator cuff syndrome Medium  7/6/2011    Illiterate Medium  8/30/2011    Irritable bowel syndrome Low  Unknown    Takotsubo cardiomyopathy Low  Unknown    CAD (coronary artery disease) High  2/29/2012    CINDY (obstructive sleep apnea)- mild AHI 10.3 Medium  3/8/2012    Verbal auditory hallucination Medium  10/4/2012    Chronic low back pain Medium  1/22/2013    Migraine headache Medium  4/22/2013    HTN, goal below 140/90 Medium  7/29/2013    Health Care Home Medium  8/16/2013    Lumbago Medium  9/20/2013    Cervicalgia Medium  9/20/2013    Cocaine abuse, episodic Medium  10/3/2013    Suicidal ideation Medium  5/1/2014    Esophageal reflux Medium  6/9/2014    Mild nonproliferative diabetic retinopathy (H) Medium  6/19/2014    Tardive dyskinesia Medium  9/11/2015    Alcohol use Medium  4/19/2016    Left cataract Medium  7/25/2016    Falls frequently Medium  8/18/2016    History of uterine cancer Medium  12/7/2016    Depression Medium  12/28/2016    Psychophysiological insomnia Medium  3/9/2017    Dysuria Medium  6/12/2017    Asymptomatic postmenopausal status Medium  6/28/2017    Abdominal pain, right lower quadrant Medium  7/13/2017      Code Status History     Date Active Date Inactive Code Status Order ID Comments User Context    9/4/2017  9:30 AM  Full Code [id number]  Manfred Frost MD Outpatient    8/29/2017  6:12 PM 9/4/2017  9:30 AM Full Code [id number]  Belgica Hall MD Inpatient    12/28/2016  6:02 PM 12/30/2016  4:20 PM Full Code [id number]  Estella Smith RN Inpatient    12/26/2016  9:48 AM 12/28/2016  6:02 PM Full Code [id number]  Albino Castañeda MD Outpatient    10/24/2016 10:48 PM 10/27/2016  5:39 PM Full Code  [id number]  Rosa Soto, DAVIN Inpatient    5/4/2016  3:02 PM 10/24/2016 10:48 PM Full Code [id number]  Chava Barrios MD Outpatient    5/3/2016  3:27 AM 5/4/2016  3:02 PM Full Code [id number]  Roverto Fitzgerald MD Inpatient    2/15/2016  1:54 AM 2/22/2016  5:18 PM Full Code [id number]  Codie Coates MD Inpatient    6/17/2015  9:02 PM 6/29/2015  4:02 PM Full Code [id number]  Manjinder Escamilla RN Inpatient    3/7/2015  7:55 PM 3/16/2015  2:54 PM Full Code [id number]  Ada Villela RN Inpatient    1/6/2015  7:17 AM 1/7/2015  6:05 PM Full Code [id number]  Michael Brown MD Inpatient    12/13/2014  2:18 AM 12/23/2014  2:06 PM Full Code [id number]  Jean Claude Meza MD Inpatient    5/29/2014 10:00 AM 12/13/2014  2:18 AM Full Code [id number]  DELROY Gomez MD Outpatient    5/29/2014  1:15 AM 5/29/2014 10:00 AM Full Code [id number]  Cha Denny DO Inpatient    5/1/2014  9:00 PM 5/8/2014  1:53 PM Full Code [id number]  Karthikeyan Hooper MD Inpatient    10/2/2013  3:34 PM 10/5/2013  4:59 PM Full Code [id number]  Kae Stacy RN Inpatient    10/30/2012 11:46 AM 10/2/2013  3:34 PM Full Code [id number]  Bibi Agrawal MD Outpatient    10/28/2012 10:15 PM 10/30/2012 11:46 AM Full Code [id number]  Mag Agrawal MD Inpatient         Medication Review      START taking        Dose / Directions Comments    chlorthalidone 25 MG tablet   Commonly known as:  HYGROTON   Used for:  HTN, goal below 140/90        Dose:  25 mg   Take 1 tablet (25 mg) by mouth daily   Quantity:  30 tablet   Refills:  0          CONTINUE these medications which may have CHANGED, or have new prescriptions. If we are uncertain of the size of tablets/capsules you have at home, strength may be listed as something that might have changed.        Dose / Directions Comments    insulin aspart 100 UNIT/ML injection   Commonly known as:  NovoLOG FLEXPEN   This may have changed:  See the new instructions.   Used for:  Type 2 diabetes  mellitus with mild nonproliferative retinopathy without macular edema, with long-term current use of insulin, unspecified laterality (H)        10 units AC   Quantity:  15 mL   Refills:  1        insulin glargine 100 UNIT/ML injection   Commonly known as:  LANTUS   This may have changed:  how much to take   Used for:  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, with long-term current use of insulin, unspecified laterality (H)        Dose:  30 Units   Inject 30 Units Subcutaneous At Bedtime   Refills:  0          CONTINUE these medications which have NOT CHANGED        Dose / Directions Comments    ACETAMINOPHEN PO        Dose:  1000 mg   Take 1,000 mg by mouth every 6 hours as needed for pain or fever   Refills:  0        aspirin 81 MG EC tablet   Commonly known as:  ASPIRIN LOW DOSE   Used for:  Coronary artery disease involving native heart with angina pectoris, unspecified vessel or lesion type (H)        Dose:  81 mg   Take 1 tablet (81 mg) by mouth daily   Quantity:  100 tablet   Refills:  4        atorvastatin 80 MG tablet   Commonly known as:  LIPITOR   Used for:  Hyperlipidemia LDL goal <100        TAKE 1 TABLET (80MG) BY MOUTH DAILY   Quantity:  28 tablet   Refills:  11    Maximum Refills Reached       BENZTROPINE MESYLATE PO        Dose:  1 mg   Take 1 mg by mouth 2 times daily   Refills:  0        blood glucose monitoring lancets   Used for:  Type 2 diabetes mellitus with diabetic neuropathy, with long-term current use of insulin (H)        Use to test blood sugar 2 times daily or as directed.  Ok to substitute alternative if insurance prefers.   Quantity:  1 Box   Refills:  prn        CITALOPRAM HYDROBROMIDE PO        Dose:  15 mg   Take 15 mg by mouth daily   Refills:  0        gabapentin 300 MG capsule   Commonly known as:  NEURONTIN   Used for:  Type 2 diabetes mellitus with diabetic neuropathy, with long-term current use of insulin (H)        TAKE 2 CAPSULES  (600MG) BY MOUTH THREE TIMES A DAY   Quantity:  168 capsule   Refills:  1    Maximum Refills Reached       glucose 4 G Chew chewable tablet   Used for:  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, with long-term current use of insulin, unspecified laterality (H)        Take 2 every 15 minutes for blood sugar <70mg/dL. Recheck blood sugar every 15 minutes until above 70mg/dL, then eat a substantial meal.   Quantity:  20 tablet   Refills:  1        HALOPERIDOL PO        Dose:  5 mg   Take 5 mg by mouth At Bedtime   Refills:  0        ibuprofen 600 MG tablet   Commonly known as:  ADVIL/MOTRIN   Used for:  Closed fracture of one rib of right side, initial encounter        Dose:  600 mg   Take 1 tablet (600 mg) by mouth every 4 hours as needed for moderate pain   Quantity:  60 tablet   Refills:  0        losartan 100 MG tablet   Commonly known as:  COZAAR   Used for:  Coronary artery disease involving native heart with angina pectoris, unspecified vessel or lesion type (H)        TAKE 1 TABLET (100MG) BY MOUTH DAILY   Quantity:  90 tablet   Refills:  1        melatonin 5 MG Caps   Used for:  Insomnia, unspecified type        Dose:  1 capsule   Take 1 capsule by mouth daily At dinnertime   Quantity:  90 capsule   Refills:  1        metoprolol 100 MG 24 hr tablet   Commonly known as:  TOPROL-XL   Used for:  Coronary artery disease involving native heart with angina pectoris, unspecified vessel or lesion type (H)        TAKE 1 TABLET (100MG) BY MOUTH DAILY   Quantity:  28 tablet   Refills:  11    Maximum Refills Reached       ONE TOUCH ULTRA test strip   Used for:  Type 2 diabetes mellitus with diabetic neuropathy, with long-term current use of insulin (H)   Generic drug:  blood glucose monitoring        TEST TWICE DAILY AS DIRECTED   Quantity:  150 strip   Refills:  8    **Patient requests 90 days supply**       * order for DME   Used for:  Falls frequently        Equipment being  ordered: Rolling walker   Quantity:  1 Device   Refills:  0        * order for DME   Used for:  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, with long-term current use of insulin, unspecified laterality (H)        Hold Novolog if meals are refused.   Quantity:  1 each   Refills:  0        polyethylene glycol powder   Commonly known as:  MIRALAX/GLYCOLAX   Used for:  Constipation, unspecified constipation type        TAKE 17 GRAMS (1 CAPFUL) BY MOUTH DAILY   Quantity:  527 g   Refills:  3    Maximum Refills Reached       ranitidine 300 MG tablet   Commonly known as:  ZANTAC   Used for:  Gastroesophageal reflux disease without esophagitis        TAKE 1 TABLET (300MG) BY MOUTH AT BEDTIME   Quantity:  28 tablet   Refills:  3    Maximum Refills Reached       senna-docusate 8.6-50 MG per tablet   Commonly known as:  SENOKOT-S;PERICOLACE        Dose:  1 tablet   Take 1 tablet by mouth 2 times daily as needed for constipation   Refills:  0        TRULICITY 1.5 MG/0.5ML pen   Used for:  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, with long-term current use of insulin, unspecified laterality (H)   Generic drug:  dulaglutide        INJECT 1.5MG SUBCUTANEOUSLY EVERY SEVEN DAYS   Quantity:  2 mL   Refills:  11        ULTICARE MINI 31G X 6 MM   Used for:  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, with long-term current use of insulin, unspecified laterality (H)   Generic drug:  insulin pen needle        USE 4 DAILY OR AS DIRECTED   Quantity:  100 each   Refills:  11    Refill Too Soon       vitamin D3 02746 UNITS capsule   Commonly known as:  CHOLECALCIFEROL   Used for:  Vitamin D deficiency        TAKE 1 CAPSULE (50,000 UNITS) BY MOUTH ONCE A WEEK   Quantity:  4 capsule   Refills:  3    Refill Too Soon       * Notice:  This list has 2 medication(s) that are the same as other medications prescribed for  you. Read the directions carefully, and ask your doctor or other care provider to review them with you.            Summary of Visit     Reason for your hospital stay       You had come in with confusion and fever and were found to have a pneumonia.             After Care     Activity       Your activity upon discharge: activity as tolerated       Diet       Follow this diet upon discharge: Regular       Monitor and record       blood glucose 4 times a day, before meals and at bedtime and notify md for blood glucose less than 70 or greater than 450             Your next 10 appointments already scheduled     Sep 19, 2017  9:00 AM CDT   Return Visit with [facility] ([facility])    [address]  [address]  [facility] [address]   [phone]            Sep 19, 2017  9:00 AM CDT   [place] with [facility] [place]    [address]  [address]  [facility] [address]   [phone]            Nov 15, 2017 10:15 AM CST   RETURN RETINA with Tiburcio Chacon MD   [facility] ([facility])    [address]  [address]  [address]  [facility] [address]   [phone]              Follow-Up Appointment Instructions     Future Labs/Procedures    Follow-up and recommended labs and tests      Comments:    Follow up with primary care provider in 1-2 weeks to check BP control and to address diabetic management.   You will also need follow up in 6 weeks for repeat CXR.      Follow-Up Appointment Instructions     Follow-up and recommended labs and tests        Follow up with primary care provider in 1-2 weeks to check BP control and to address diabetic management.   You will also need follow up in 6 weeks for repeat CXR.             Statement of Approval     Ordered          09/04/17 0930  I  have reviewed and agree with all the recommendations and orders detailed in this document.  EFFECTIVE NOW     Approved and electronically signed by:  Manfred Frost MD

## 2017-08-29 NOTE — H&P
PRESENTING COMPLAINT:  Fall and confusion.      HISTORY OF PRESENT ILLNESS:  Ms. Nena Tang is a 57-year-old female who has a past medical history notable for schizoaffective disorder, hypertension, hyperlipidemia, coronary artery disease, obstructive sleep apnea, on CPAP, type 2 diabetes, history of polysubstance abuse, currently living in a group home situation, who is brought in by caregivers because of a fall and confusion.  She apparently was in her normal state of health yesterday and [place], where she tells me that she felt well, but is brought in today for evaluation after having a fall.  It sounds like this morning when she awoke she declined breakfast.  She actually does frequently skip meals and it sounds like she does have gastroparesis and that might be the etiology.  Her blood sugars were high at 260 and then rechecked at 218.  It was deemed that she felt a little bit dizzy and a staff member came in to find the patient on the ground and they believed that a fall had occurred, but there was no significant obvious trauma.  The caregiver reported to the Emergency Room physician that she seemed that she had been not acting her normal self and seemed confused.  She had some nausea that started around noon today and apparently had some diarrhea as well as an episode of urinary incontinence in the car yesterday.      The history is obtained in discussion with the Emergency Room physician.  The patient is not able to give me at least reliable information that I can tell.  She is able to recall that she was at the [facility] yesterday and she ate a variety of different foods.  She states that she feels like she is just nauseated and vomiting and she has been doing that all day today.  She reports diarrhea that is watery brown, but denies sick contacts at her group home.  Furthermore though, she states that she is short of breath and she has been coughing, but cannot tell me whether it is productive or  not.  She further endorses nasal congestion, earache and sore throat, but she cannot tell me how long that has been going on.  When I ask her about urinary symptoms, she states that yes she does have dysuria and urgency as well.      In the Emergency Room, she is febrile at 102.7.  She is tachycardic, hypertensive and mildly hypoxic at 88%.  Evaluation is notable for lactic acidosis, hyperglycemia and mild leukocytosis.  Urinalysis is positive only for glucose.  Chest x-ray does not show a discrete infiltrate to my personal read.  She is admitted for sepsis, etiology unclear.  She has been given a couple of liters of fluid in the Emergency Room, as well as treatment for suspected community-acquired pneumonia.      Please note that the history is obtained in direct discussion with the Emergency Room physician, Dr. Alberts, and discussion with her PCA, Zheng Barnes, both of those with good reliability, but the patient is of a suspicious poor reliability.      PAST MEDICAL HISTORY:   1.  Schizoaffective disorder.   2.  Hypertension.   3.  Type 2 diabetes, which was under such poor control that that is what prompted her admission to the [facility] about a year ago.   4.  Obstructive sleep apnea, on CPAP.   5.  Underlying coronary artery disease.   6.  Hyperlipidemia.   7.  History of polysubstance abuse, but apparently sober.      PAST SURGICAL HISTORY:   1.  Trigger finger release, left in 2012, right in 2016.   2.  Cataract surgery on left and right in 2017.   3.  Cholecystectomy in 2008.   4.  Hysterectomy in 1983.   5.  Oophorectomy.      CURRENT MEDICATIONS:  Have not been formally reconciled and she is unable to give me a history regarding those medications.      ALLERGIES:  Imidazole, which causes hives, ketoprofen topical causes itching, lisinopril causes hives, metformin causes lactic acidosis, metronidazole causes hives and posaconazole causes hives.  Apparently, she does tolerate fluconazole.      SOCIAL  HISTORY:  She currently lives in a group home.  She is a never smoker.  She does not drink alcohol.  She has a history of cocaine and heroin abuse, but apparently, that is in remission.  Currently is living in a group home.  She is independent with her ADLs, although the patient tells me she uses a cane.  At the adult foster care, she is administered medications.      FAMILY HISTORY:  Reviewed and noncontributory to this hospitalization.      REVIEW OF SYSTEMS:  A 10-point review is completed and is otherwise negative except as listed in the HPI.      PHYSICAL EXAMINATION:   VITAL SIGNS:  BPs are in the 160s-170s/70s, heart rates in the 100s, respiratory rate is 16.  Presenting oxygen at 88% on room air.  Otherwise, it is 100% on 2 liters by nasal cannula, temp is 102.7.   GENERAL:  This is a very pleasant female.  She is alert and interactive, but her answers are suspect.  I do not feel that I can get a good history from her.   HEENT:  Her head is normocephalic, atraumatic.  Sclerae are clear.  Pupils are equal, round and reactive to light.   NECK:  Supple.   LUNGS:  Clear to auscultation and she exhibits normal respiratory effort.   HEART:  Regular rate and rhythm without murmurs, rubs or gallops.   ABDOMEN:  Soft,  nontender, nondistended.   SKIN:  Warm and dry and there is no cyanosis or clubbing of the extremities.   NEUROLOGIC:  Cranial nerves II-XII are grossly intact and strength and sensation appear intact in the bilateral upper and lower extremities.      LABORATORY EVALUATION:  Comprehensive metabolic panel is within normal limits.  AST is slightly elevated at 57.  Lactic acid on presentation was 3.6 and on recheck is 3.  Carbon dioxide is slightly low at 19, but she has normal gap of 11.  Glucose is 223.  VBG is 7.37, 40, 39, 24.  CBC shows a white count 13.6.  Differential is neutrophilia.  Hemoglobin is 10.5 and platelet count is 214.  Urinalysis just shows glucose, but no evidence of inflammatory  findings.  Urine culture was obtained.  Chest x-ray shows no acute infiltrate to my read and that is confirmed by radiology.  CT of the head without contrast shows normal CT scan of the head.  No blood cultures were obtained, so even though she has received antibiotics, I will ask for it for completeness purposes.      ASSESSMENT AND PLAN:  Mrs. Tang is a 57-year-old female whose past medical history is notable for schizoaffective disorder, hypertension, type 2 diabetes, obstructive sleep apnea, on CPAP, and hyperlipidemia who presented to this facility with evidence of sepsis in light of lactic acidosis, tachycardia, fever and leukocytosis of unclear source.   1.  Sepsis:  Etiology is unclear to me.  She has a positive review of systems, including nasal congestion, earache, sore throat, cough, shortness breath, nausea, vomiting, diarrhea and dysuria.  Chest x-ray does not look consistent with a community-acquired pneumonia and urinalysis is without infection.  She has no significant neck tenderness to make me concerned about meningitis and I see no new skin rashes.  At this point in time, I think it is quite possible that she does have a gastritis as she had some diarrhea yesterday that is verified with her PCA and she has been having nausea and vomiting today.  That would be highly suspicious for a viral illness, so for now, we will hold off on additional antibiotics and let her clear herself.  I have requested blood cultures x2 despite antibiotics that have already been given.  Will order an enteric panel, as well as Clostridium difficile.  Will give intravenous fluids for another 1500 mL over the next 4 hours and recheck a lactate at that time.   2.  Hypertension:  Currently, her outpatient regimen is unknown.  She is definitely hypertensive now, so will have p.r.n. intravenous labetalol available and then would recommend resuming her blood pressure medications once they are known.   3.  Obstructive sleep  apnea, on home CPAP:  Will continue CPAP as at home.   4.  Type 2 diabetes:  Again, unclear what she is taking at home.  Once that has been verified those should be continued unless she is on metformin, which should be stopped in light of her lactic acidosis (although she has had lactic acidosis with metformin, so should not be on her regimen).  Will add an insulin sliding scale and will give her Glargine 5 units x1 now as we await her medication situation being reconciled.   5.  Non-anion gap metabolic acidosis.  I suspect this is actually her lactic acidosis.   6.  Hyperlipidemia:  Continue statin once that is reconciled.   7.  Prophylaxis:  Would use subcutaneous enoxaparin.   8.  CODE STATUS IS FULL.   9.  Disposition:  Admit inpatient.         GREGORIO NUÑEZ MD             D: 2017 17:05   T: 2017 18:33   MT: LUIS      Name:     ERIK BURNHAM   MRN:      [id number]        Account:      [id number]   :      1960           Admitted:     [id number]      Document: [id number]

## 2017-08-29 NOTE — IP AVS SNAPSHOT
"          [place] [address] [facility]: [phone]                                              INTERAGENCY TRANSFER FORM - LAB / IMAGING / EKG / EMG RESULTS   2017                    Hospital Admission Date: 2017  ERIK BURNHAM   : 1960  Sex: Female        Attending Provider: Belgica Hall MD     Allergies:  Imidazole Antifungals, Ketoprofen, Lisinopril, Metformin, Metronidazole, Posaconazole    Infection:  None   Service:  GENERAL MEDI    Ht:  1.549 m (5' 1\")   Wt:  101.8 kg (224 lb 6.4 oz)   Admission Wt:  104.1 kg (229 lb 8 oz)    BMI:  42.4 kg/m 2   BSA:  2.09 m 2            Patient PCP Information     None on File         Lab Results - 3 Days      Glucose by meter [[id number]] (Abnormal)  Resulted: 17 1131, Result status: Final result    Ordering provider: Belgica Hall MD  17 1121 Resulting lab: POINT OF CARE TEST, GLUCOSE    Specimen Information    Type Source Collected On     17 1121          Components       Value Reference Range Flag Lab   Glucose 182 70 - 99 mg/dL H 170            Glucose by meter [[id number]] (Abnormal)  Resulted: 17 0801, Result status: Final result    Ordering provider: Belgica Hall MD  17 0757 Resulting lab: POINT OF CARE TEST, GLUCOSE    Specimen Information    Type Source Collected On     17 0757          Components       Value Reference Range Flag Lab   Glucose 185 70 - 99 mg/dL H 170            Creatinine [[id number]]  Resulted: 17 0750, Result status: Final result    Ordering provider: Belgica Hall MD  17 1800 Resulting lab: [facility]    Specimen Information    Type Source Collected On   Blood  17 0715          Components       Value Reference Range Flag Lab   Creatinine 0.78 0.52 - 1.04 mg/dL  FrRdHs   GFR Estimate 76 >60 mL/min/1.7m2  FrRd   Comment:  Non  GFR Calc   GFR Estimate If Black >90 >60 mL/min/1.7m2  FrRd   Comment:   GFR Calc       "      Platelet count [[id number]]  Resulted: 09/04/17 0744, Result status: Final result    Ordering provider: Belgica Hall MD  09/04/17 0000 Resulting lab: [facility]    Specimen Information    Type Source Collected On   Blood  09/04/17 0715          Components       Value Reference Range Flag Lab   Platelet Count 233 150 - 450 10e9/L  [facility]            Blood culture [[id number]]  Resulted: 09/04/17 0520, Result status: Final result    Ordering provider: Belgica Hall MD  08/29/17 1706 Resulting lab: MICRO RAPID TESTING LAB    Specimen Information    Type Source Collected On   Blood  08/29/17 1735   Comment:  Right Hand          Components       Value Reference Range Flag Lab   Specimen Description Blood Right Hand      Special Requests Aerobic and anaerobic bottles received   75   Culture Micro No growth   226            Blood culture [[id number]]  Resulted: 09/04/17 0520, Result status: Final result    Ordering provider: Bar Alberts MD  08/29/17 1525 Resulting lab: MICRO RAPID TESTING LAB    Specimen Information    Type Source Collected On   Blood  08/29/17 1632   Comment:  Left Arm          Components       Value Reference Range Flag Lab   Specimen Description Blood Left Arm      Special Requests Aerobic and anaerobic bottles received   75   Culture Micro No growth   226            Blood culture [[id number]]  Resulted: 09/04/17 0520, Result status: Final result    Ordering provider: Bar Alberts MD  08/29/17 1525 Resulting lab: MICRO RAPID TESTING LAB    Specimen Information    Type Source Collected On   Blood  08/29/17 1325   Comment:  Right Hand          Components       Value Reference Range Flag Lab   Specimen Description Blood Right Hand      Culture Micro No growth   226            Glucose by meter [[id number]] (Abnormal)  Resulted: 09/04/17 0136, Result status: Final result    Ordering provider: Belgica Hall MD  09/04/17 0131 Resulting lab: POINT OF CARE TEST, GLUCOSE     Specimen Information    Type Source Collected On     09/04/17 0131          Components       Value Reference Range Flag Lab   Glucose 217 70 - 99 mg/dL H 170            Glucose by meter [[id number]] (Abnormal)  Resulted: 09/03/17 2211, Result status: Final result    Ordering provider: Belgica Hall MD  09/03/17 2151 Resulting lab: POINT OF CARE TEST, GLUCOSE    Specimen Information    Type Source Collected On     09/03/17 2151          Components       Value Reference Range Flag Lab   Glucose 309 70 - 99 mg/dL H 170            Glucose by meter [[id number]] (Abnormal)  Resulted: 09/03/17 1721, Result status: Final result    Ordering provider: Belgica Hall MD  09/03/17 1708 Resulting lab: POINT OF CARE TEST, GLUCOSE    Specimen Information    Type Source Collected On     09/03/17 1708          Components       Value Reference Range Flag Lab   Glucose 266 70 - 99 mg/dL H 170            Glucose by meter [[id number]] (Abnormal)  Resulted: 09/03/17 1221, Result status: Final result    Ordering provider: Belgica Hall MD  09/03/17 1207 Resulting lab: POINT OF CARE TEST, GLUCOSE    Specimen Information    Type Source Collected On     09/03/17 1207          Components       Value Reference Range Flag Lab   Glucose 196 70 - 99 mg/dL H 170            Procalcitonin [[id number]]  Resulted: 09/03/17 1147, Result status: Final result    Ordering provider: Manfred Frost MD  09/03/17 0000 Resulting lab: [facility]    Specimen Information    Type Source Collected On   Blood  09/03/17 0705          Components       Value Reference Range Flag Lab   Procalcitonin 0.43 ng/ml  [facility]   Comment:         0.25-0.49 ng/ml  Possible early systemic infection or localized infection.     Recommendation: Encourage antibiotics only in the correct clinical context.   Consider obtaining blood cultures or other relevant cultures. Recheck PCT in   6-12 hours to ensure baseline low level. If repeat PCT is rising, consider   early  systemic infection and consider starting antibiotics.              Glucose by meter [[id number]] (Abnormal)  Resulted: 09/03/17 0910, Result status: Final result    Ordering provider: Belgica Hall MD  09/03/17 0859 Resulting lab: POINT OF CARE TEST, GLUCOSE    Specimen Information    Type Source Collected On     09/03/17 0859          Components       Value Reference Range Flag Lab   Glucose 143 70 - 99 mg/dL H 170            Basic metabolic panel [[id number]] (Abnormal)  Resulted: 09/03/17 0749, Result status: Final result    Ordering provider: Manfred Frost MD  09/03/17 0000 Resulting lab: [facility]    Specimen Information    Type Source Collected On   Blood  09/03/17 0705          Components       Value Reference Range Flag Lab   Sodium 139 133 - 144 mmol/L  FrRdHs   Potassium 3.9 3.4 - 5.3 mmol/L  FrRdHs   Chloride 103 94 - 109 mmol/L  FrRdHs   Carbon Dioxide 29 20 - 32 mmol/L  FrRdHs   Anion Gap 7 3 - 14 mmol/L  FrRdHs   Glucose 154 70 - 99 mg/dL H FrRdHs   Urea Nitrogen 8 7 - 30 mg/dL  FrRdHs   Creatinine 0.89 0.52 - 1.04 mg/dL  FrRdHs   GFR Estimate 65 >60 mL/min/1.7m2  FrRdHs   Comment:  Non  GFR Calc   GFR Estimate If Black 79 >60 mL/min/1.7m2  FrRdHs   Comment:  African American GFR Calc   Calcium 9.0 8.5 - 10.1 mg/dL  FrRdHs            CBC with platelets [[id number]] (Abnormal)  Resulted: 09/03/17 0734, Result status: Final result    Ordering provider: Manfred Frost MD  09/03/17 0000 Resulting lab: [facility]    Specimen Information    Type Source Collected On   Blood  09/03/17 0705          Components       Value Reference Range Flag Lab   WBC 6.4 4.0 - 11.0 10e9/L  FrRdHs   RBC Count 2.76 3.8 - 5.2 10e12/L L FrRdHs   Hemoglobin 8.5 11.7 - 15.7 g/dL L FrRdHs   Hematocrit 25.8 35.0 - 47.0 % L FrRdHs   MCV 94 78 - 100 fl  FrRdHs   MCH 30.8 26.5 - 33.0 pg  FrRdHs   MCHC 32.9 31.5 - 36.5 g/dL  FrRdHs   RDW 13.0 10.0 - 15.0 %  FrRdHs   Platelet Count 188 150 -  450 10e9/L  [facility]            Glucose by meter [[id number]] (Abnormal)  Resulted: 09/03/17 0135, Result status: Final result    Ordering provider: Belgica Hall MD  09/03/17 0133 Resulting lab: POINT OF CARE TEST, GLUCOSE    Specimen Information    Type Source Collected On     09/03/17 0133          Components       Value Reference Range Flag Lab   Glucose 219 70 - 99 mg/dL H 170            Glucose by meter [[id number]] (Abnormal)  Resulted: 09/02/17 2126, Result status: Final result    Ordering provider: Belgica Hall MD  09/02/17 2122 Resulting lab: POINT OF CARE TEST, GLUCOSE    Specimen Information    Type Source Collected On     09/02/17 2122          Components       Value Reference Range Flag Lab   Glucose 228 70 - 99 mg/dL H 170            Glucose by meter [[id number]] (Abnormal)  Resulted: 09/02/17 1635, Result status: Final result    Ordering provider: Belgica Hall MD  09/02/17 1629 Resulting lab: POINT OF CARE TEST, GLUCOSE    Specimen Information    Type Source Collected On     09/02/17 1629          Components       Value Reference Range Flag Lab   Glucose 243 70 - 99 mg/dL H 170            Glucose by meter [[id number]] (Abnormal)  Resulted: 09/02/17 1141, Result status: Final result    Ordering provider: Belgica Hall MD  09/02/17 1137 Resulting lab: POINT OF CARE TEST, GLUCOSE    Specimen Information    Type Source Collected On     09/02/17 1137          Components       Value Reference Range Flag Lab   Glucose 182 70 - 99 mg/dL H 170            Glucose by meter [[id number]]  Resulted: 09/02/17 0756, Result status: Final result    Ordering provider: Belgica Hall MD  09/02/17 0747 Resulting lab: POINT OF CARE TEST, GLUCOSE    Specimen Information    Type Source Collected On     09/02/17 0747          Components       Value Reference Range Flag Lab   Glucose 96 70 - 99 mg/dL  170            Phosphorus [[id number]]  Resulted: 09/02/17 0746, Result status: Final result    Ordering provider: Manfred Frost  MD CHEYANNE  09/02/17 0000 Resulting lab: [facility]    Specimen Information    Type Source Collected On   Blood  09/02/17 0705          Components       Value Reference Range Flag Lab   Phosphorus 4.1 2.5 - 4.5 mg/dL  FrRdHs            Basic metabolic panel [[id number]] (Abnormal)  Resulted: 09/02/17 0746, Result status: Final result    Ordering provider: Manfred Frost MD  09/02/17 0000 Resulting lab: [facility]    Specimen Information    Type Source Collected On   Blood  09/02/17 0705          Components       Value Reference Range Flag Lab   Sodium 139 133 - 144 mmol/L  FrRdHs   Potassium 4.4 3.4 - 5.3 mmol/L  FrRdHs   Chloride 105 94 - 109 mmol/L  FrRdHs   Carbon Dioxide 27 20 - 32 mmol/L  FrRdHs   Anion Gap 7 3 - 14 mmol/L  FrRdHs   Glucose 96 70 - 99 mg/dL  FrRdHs   Urea Nitrogen 6 7 - 30 mg/dL L FrRdHs   Creatinine 0.83 0.52 - 1.04 mg/dL  FrRdHs   GFR Estimate 71 >60 mL/min/1.7m2  FrRd   Comment:  Non  GFR Calc   GFR Estimate If Black 86 >60 mL/min/1.7m2  FrRd   Comment:  African American GFR Calc   Calcium 8.6 8.5 - 10.1 mg/dL  FrRdHs            Magnesium [[id number]]  Resulted: 09/02/17 0746, Result status: Final result    Ordering provider: Manfred Frost MD  09/02/17 0000 Resulting lab: [facility]    Specimen Information    Type Source Collected On   Blood  09/02/17 0705          Components       Value Reference Range Flag Lab   Magnesium 2.1 1.6 - 2.3 mg/dL  FrRdHs            Glucose by meter [[id number]] (Abnormal)  Resulted: 09/02/17 0111, Result status: Final result    Ordering provider: Belgica Hall MD  09/02/17 0105 Resulting lab: POINT OF CARE TEST, GLUCOSE    Specimen Information    Type Source Collected On     09/02/17 0105          Components       Value Reference Range Flag Lab   Glucose 171 70 - 99 mg/dL H 170            Glucose by meter [[id number]] (Abnormal)  Resulted: 09/01/17 2126, Result status: Final result    Ordering provider:  Belgica Hall MD  09/01/17 2044 Resulting lab: POINT OF CARE TEST, GLUCOSE    Specimen Information    Type Source Collected On     09/01/17 2044          Components       Value Reference Range Flag Lab   Glucose 230 70 - 99 mg/dL H 170            Glucose by meter [[id number]] (Abnormal)  Resulted: 09/01/17 1645, Result status: Final result    Ordering provider: Belgica Hall MD  09/01/17 1640 Resulting lab: POINT OF CARE TEST, GLUCOSE    Specimen Information    Type Source Collected On     09/01/17 1640          Components       Value Reference Range Flag Lab   Glucose 146 70 - 99 mg/dL H 170            Glucose by meter [[id number]] (Abnormal)  Resulted: 09/01/17 1221, Result status: Final result    Ordering provider: Belgica Hall MD  09/01/17 1218 Resulting lab: POINT OF CARE TEST, GLUCOSE    Specimen Information    Type Source Collected On     09/01/17 1218          Components       Value Reference Range Flag Lab   Glucose 191 70 - 99 mg/dL H 170            Glucose by meter [[id number]]  Resulted: 09/01/17 0926, Result status: Final result    Ordering provider: Belgica Hall MD  09/01/17 0914 Resulting lab: POINT OF CARE TEST, GLUCOSE    Specimen Information    Type Source Collected On     09/01/17 0914          Components       Value Reference Range Flag Lab   Glucose 86 70 - 99 mg/dL  170            Basic metabolic panel [[id number]]  Resulted: 09/01/17 0737, Result status: Final result    Ordering provider: Garry Naylor DO  09/01/17 0000 Resulting lab: [facility]    Specimen Information    Type Source Collected On   Blood  09/01/17 0700          Components       Value Reference Range Flag Lab   Sodium 140 133 - 144 mmol/L  FrRdHs   Potassium 4.1 3.4 - 5.3 mmol/L  FrRdHs   Chloride 105 94 - 109 mmol/L  FrRdHs   Carbon Dioxide 28 20 - 32 mmol/L  FrRdHs   Anion Gap 7 3 - 14 mmol/L  FrRdHs   Glucose 84 70 - 99 mg/dL  FrRdHs   Urea Nitrogen 9 7 - 30 mg/dL  FrRdHs   Creatinine 0.82 0.52 - 1.04  mg/dL  FrRdHs   GFR Estimate 72 >60 mL/min/1.7m2  FrRdHs   Comment:  Non  GFR Calc   GFR Estimate If Black 88 >60 mL/min/1.7m2  FrRdHs   Comment:  African American GFR Calc   Calcium 9.1 8.5 - 10.1 mg/dL  FrRdHs            CRP inflammation [[id number]] (Abnormal)  Resulted: 09/01/17 0737, Result status: Final result    Ordering provider: Garry Naylor,   09/01/17 0000 Resulting lab: [facility]    Specimen Information    Type Source Collected On   Blood  09/01/17 0700          Components       Value Reference Range Flag Lab   CRP Inflammation 56.3 0.0 - 8.0 mg/L H FrRdHs            CBC with platelets [[id number]] (Abnormal)  Resulted: 09/01/17 0722, Result status: Final result    Ordering provider: Garry Naylor,   09/01/17 0000 Resulting lab: [facility]    Specimen Information    Type Source Collected On   Blood  09/01/17 0700          Components       Value Reference Range Flag Lab   WBC 6.3 4.0 - 11.0 10e9/L  FrRdHs   RBC Count 2.88 3.8 - 5.2 10e12/L L FrRdHs   Hemoglobin 8.8 11.7 - 15.7 g/dL L FrRdHs   Hematocrit 27.0 35.0 - 47.0 % L FrRdHs   MCV 94 78 - 100 fl  FrRdHs   MCH 30.6 26.5 - 33.0 pg  FrRdHs   MCHC 32.6 31.5 - 36.5 g/dL  FrRdHs   RDW 13.0 10.0 - 15.0 %  FrRdHs   Platelet Count 158 150 - 450 10e9/L  FrRdHs            Testing Performed By     Lab - Abbreviation Name Director Address Valid Date Range    12 - FrRdHs [facility] Unknown [address] [place]  [facility] [address] 05/08/15 1057 - Present    14 - FrStHsLb [facility] Unknown [address] [place] [address] 05/08/15 1057 - Present    75 - Unknown [facility] Unknown [address] [facility] [address] 01/15/15 1019 - Present    170 - Unknown POINT OF CARE TEST, GLUCOSE Unknown Unknown 10/31/11 1114 - Present    226 - Unknown MICRO RAPID TESTING LAB Unknown [address] [facility] [address] 12/19/14 0955 - Present             Unresulted Labs (24h ago through future)    Start       Ordered    17 0600  Platelet count  (Pharmacological Prophylaxis - enoxaparin (LOVENOX) *Use only if creatinine clearance is greater than 30 mL/min)  EVERY THREE DAYS,   Routine     Comments:  Repeat every 3 days while on VTE prophylaxis.  Notify provider and hold enoxaparin if platelet count falls by 50% of baseline. If no result is listed, this lab has not been done the past 365 days. LATEST LAB RESULT: Platelet Count (10e9/L)       Date                     Value                 2017               214              ----------    17 1812    17 0000  Creatinine  (Pharmacological Prophylaxis - enoxaparin (LOVENOX) *Use only if creatinine clearance is greater than 30 mL/min)  EVERY THREE DAYS,   Routine     Comments:  Repeat every 3 days while on VTE prophylaxis.    17 1812         ECG/EMG Results      Echocardiogram Complete [[id number]]  Resulted: 17 1437, Result status: Edited Result - FINAL    Ordering provider: Garry Naylor DO  17 1344 Resulted by: Agustín Wiledr MD    Performed: 17 1430 - 17 1500 Resulting lab: RADIOLOGY RESULTS    Narrative:       [id number]  [id number]  [id number]  [id number]^GARRY^ULYSSES           [facility]  Echocardiography Laboratory  [address] [place] [address]        Name: ERIK BURNHAM  MRN: [id number]  : 1960  Study Date: 2017 02:37 PM  Age: 57 yrs  Gender: Female  Patient Location: [facility] [place]  Reason For Study: VT  Ordering Physician: GARRY NAYLOR  Performed By: Rosa Elena Vogt RDCS     BSA: 2.0 m2  Height: 61 in  Weight: 230 lb  HR: 78  BP: 165/66 mmHg  _____________________________________________________________________________  __        Procedure  Complete Portable Echo Adult.  _____________________________________________________________________________  __        Interpretation Summary     Left ventricular systolic function  is normal.  The visual ejection fraction is estimated at 60-65%.  Right ventricular systolic pressure is elevated, consistent with mild  pulmonary hypertension. This is new compared to 2014.  The study was technically difficult.  _____________________________________________________________________________  __        Left Ventricle  The left ventricle is normal in size. There is mild concentric left  ventricular hypertrophy. Left ventricular systolic function is normal. The  visual ejection fraction is estimated at 60-65%. E by E prime ratio is between  8 and 15, which is indeterminate for assessment of left ventricular filling  pressures. No regional wall motion abnormalities noted.     Right Ventricle  The right ventricle is normal size. The right ventricular systolic function is  normal.     Atria  Normal left atrial size. Right atrial size is normal. There is no color  Doppler evidence of an atrial shunt.     Mitral Valve  There is mild (1+) mitral regurgitation.        Tricuspid Valve  There is trace tricuspid regurgitation. The right ventricular systolic  pressure is approximated at 39.0 mmHg plus the right atrial pressure. Right  ventricular systolic pressure is elevated, consistent with mild pulmonary  hypertension. Normal IVC (1.5-2.5cm) with <50% respiratory collapse; right  atrial pressure is estimated at 10-15mmHg.     Aortic Valve  The aortic valve is trileaflet. No aortic regurgitation is present. No aortic  stenosis is present.     Pulmonic Valve  The pulmonic valve is not well visualized.     Vessels  The aortic root is normal size.     Pericardium  There is no pericardial effusion.        Rhythm  The rhythm was normal sinus.  _____________________________________________________________________________  __  MMode/2D Measurements & Calculations     IVSd: 1.2 cm  LVIDd: 4.8 cm  LVIDs: 2.9 cm  LVPWd: 1.2 cm  FS: 40.4 %  EDV(Teich): 107.2 ml  ESV(Teich): 31.1 ml  LV mass(C)d: 220.2 grams  LV mass(C)dI:  109.9 grams/m2  Ao root diam: 2.5 cm  LA dimension: 4.9 cm  asc Aorta Diam: 3.1 cm  LA/Ao: 2.0  LA Volume (BP): 49.0 ml  LA Volume Index (BP): 24.5 ml/m2  TAPSE: 2.3 cm           Doppler Measurements & Calculations  MV E max omayra: 96.7 cm/sec  MV A max omayra: 95.8 cm/sec  MV E/A: 1.0  MV dec time: 0.19 sec  TR max omayra: 312.2 cm/sec  TR max P.0 mmHg  Lateral E/e': 10.0  Medial E/e': 13.1           _____________________________________________________________________________  __           Report approved by: Florencio Boykin 2017 03:22 PM       1    Type Source Collected On     17 1437          View Image (below)              Encounter-Level Documents:     There are no encounter-level documents.      Order-Level Documents:     There are no order-level documents.

## 2017-08-29 NOTE — IP AVS SNAPSHOT
[place] [address] [facility]    [address]    [facility] [address]    Phone:  [phone]    Fax:  [phone]                                       After Visit Summary   8/29/2017    Nena Tang    MRN: [id number]           After Visit Summary Signature Page     I have received my discharge instructions, and my questions have been answered. I have discussed any challenges I see with this plan with the nurse or doctor.    ..........................................................................................................................................  Patient/Patient Representative Signature      ..........................................................................................................................................  Patient Representative Print Name and Relationship to Patient    ..................................................               ................................................  Date                                            Time    ..........................................................................................................................................  Reviewed by Signature/Title    ...................................................              ..............................................  Date                                                            Time

## 2017-08-29 NOTE — IP AVS SNAPSHOT
` `     [place] [address] MEDICAL SURGICAL: [phone]                 INTERAGENCY TRANSFER FORM - NOTES (H&P, Discharge Summary, Consults, Procedures, Therapies)   2017                    Hospital Admission Date: 2017  ERIK BURNHAM   : 1960  Sex: Female        Patient PCP Information     None on File      History & Physicals     No notes of this type exist for this encounter.      Discharge Summaries     No notes of this type exist for this encounter.         Consult Notes      Consults by [place], Corinne C, LSW at 2017  5:10 PM     Author:  [place], Corinne C, LSW Service:  (none) Author Type:      Filed:  2017  5:10 PM Date of Service:  2017  5:10 PM Creation Time:  2017  4:37 PM    Status:  Signed :  [place], Corinne C, LSW ()     Consult Orders:    1. Social Work IP Consult [[id number]] ordered by Mason Hernandez MD at [facility] [employer] [employer] [facility]                Care Transition Initial Assessment -   Reason For Consult: discharge planning  Met with: Patient    Active Problems:    Sepsis (H)         DATA  Lives With: facility resident  Living Arrangements: group home- [facility] [phone] or [phone]  Description of Support System: Supportive, Involved- PT lives in a home with 3 other resident with full time staff.  Who is your support system?: Children, Facility resident(s)/Staff  Support Assessment: Adequate family and caregiver support, Adequate social supports. PT has a  Lot of community support. [facility]. CAD[facility] worker through the [facility]  through [facility] ( [place])  Identified issues/concerns regarding health management: PT admitted from home due to a fall and fevers. Per the  staff pt does well. The  staff did express concerns that pt may have issus with swallowing.  PT is being followed by speech therapy with a recommendation of Dysphagia diet 3 with thin liquids.  baylee  RN for the  setting plans to speak with primary care about a Video Swallow         Other Resources: Group Home They use [facility] located in [place]  Quality Of Family Relationships: supportive  Transportation Available:  ( staff can )      ASSESSMENT  Cognitive Status:  awake, alert and oriented  Met with pt. She is alert, uses a cane for mobility at her  setting. She really enjoys where she is living. PT directed sws to call her staff at  for any questions about her care. PT has no concerns about returning home .       PLAN  Spoke with jermain and Gisselle from [facility]. They are willing to provide transport home for pt once stable. They are able to accept pt back on the weekends and Holiday as well. Do not need to fax VT info to them. They would like a packet for orders and H&P at [place]  Following  Contact number [phone][CW1.1]       Revision History        User Key Date/Time User Provider Type Action    > CW1.1 8/31/2017  5:10 PM [place], Corinne C, LSW  Sign                     Progress Notes - Physician (Notes from 09/01/17 through 09/04/17)      Progress Notes by [place], Corinne C, LSW at 9/4/2017 10:18 AM     Author:  [place], Corinne C, LSW Service:  (none) Author Type:      Filed:  9/4/2017 10:19 AM Date of Service:  9/4/2017 10:18 AM Creation Time:  9/4/2017 10:18 AM    Status:  Signed :  [place], Corinne C, LSW ()         Discharge Planner   Discharge Plans in progress: Spoke with Jermain from [facility]. He is ok with new med being filled here at [facility]   Barriers to discharge plan: None  Follow up plan:  staff to  about 1300. Please send packet with pt home. Do not need to fax       Entered by: Corinne C. [place] 09/04/2017 10:18 AM[CW1.1]          Revision History        User Key Date/Time User Provider Type Action    > CW1.1 9/4/2017 10:19 AM [place], Corinne C, LSW  Sign            Progress Notes by Manfred Frost MD at 9/3/2017  "10:05 PM     Author:  Manfred Frost MD Service:  Hospitalist Author Type:  Physician    Filed:  9/3/2017 10:08 PM Date of Service:  9/3/2017 10:05 PM Creation Time:  9/3/2017 10:05 PM    Status:  Signed :  Manfred Frost MD (Physician)         [facility]    Hospitalist Progress Note  Name: Nena Tang    MRN: [id number]  Provider:  Manfred Frost MD    Assessment & Plan   Summary of Stay: Nena Tang is a 57 year old female who came to attention from her Group Home on 8/29/2017 following a fall and confusion. She was brought in by two caregivers who described her as having been in \"her usual state of health\" on the day prior to admission when she went to the [facility]. On the date of admission, she did not eat breakfast and reported feeling \"dizzy\" when they found the patient on the ground. She had no evident injury and was brought to the ED due to concern for her unusual behavior.     Her past medical history is notable for schizoaffective disorder, hypertension, hyperlipidemia, coronary artery disease, obstructive sleep apnea, on CPAP, type 2 diabetes, history of polysubstance abuse for which she is currently living in a group home. She was brought in by caregivers because of a fall and confusion.      In the ED, she was found to have a fever, but initially, it appeared to be without a source. History of diarrhea, vomiting and an episode of urinary incontinence was obtained, but no cough or suspected SOB. The initial CXR was negative.     There has been concern that Ms. Tang is having runs of \"VT\". However, today, it was quite evident that these are NOT VT, but rather artifact. Discussed with Cardiology.    1.  Infectious Encephalopathy. Fever, hypoxia and confusion associated with right middle and lower lobe pneumonia v atelectasis. Pt is currently on Zosyn (in order to avoid medications that might contribute to prolonged QTc). Will recheck Procalcitonin in am. Also treat presumed " yeast vaginitis with Diflucan 150 mg x 1.    2.  DM with hypoglycemia this AM:  -  Glucoses poorly controlled by distant history though recent A1C's this summer in the 7's.  She has had variable values here I believe related to her nausea/inconsistent eating.  She was hypoglycemic this AM.  I will reduce lantus to 30 units at HS and hold the 20 units with meals.  If she starts spiking as she eats more I would restart meal insulin with carb counting here based off what she is actually eating.  Continue SSI otherwise.    3.  Possible Episode of V tach seem conclusively to be artifact.    4.  HTN:  -  Inadequate control since admission.  Overall though clearly on the high side.  Toprol XL increased to 125 mg daily. Will add Chlorthalidone.    5.  CAD (non-obstructive)  -  Continue metoprolol, ASA, statin, ARB.  No acute cardiac complaints.    6.  Hyperlipidemia:  -  Statin    7.  Schizoaffective disorder:  -  Continue daily SSRI and haldol at HS.     8.  CINDY on CPAP.    DVT Prophylaxis: Enoxaparin (Lovenox) SQ  Code Status: Full Code    Disposition Plan   Expected discharge tomorrow to  prior living arrangement as pt is no longer having fevers and glucoses more stable.     Entered: Manfred Frost 09/03/2017, 10:05 PM     Interval History   Feeling improved, but still uncomfortable with cough. Feels she is coughing more. Appetite is improved. No N/V/D.    -Data reviewed today: I reviewed all new labs and imaging reports over the last 24 hours. I personally reviewed no images or EKG's today.    Physical Exam   Temp: 96.8  F (36  C) Temp src: Oral BP: 171/72   Heart Rate: 60 Resp: 18 SpO2: 98 % O2 Device: None (Room air) Oxygen Delivery: 1.5 LPM  Vitals:    09/01/17 0450 09/02/17 0455 09/03/17 0449   Weight: 104.3 kg (229 lb 14.4 oz) 103.6 kg (228 lb 4.8 oz) 103.4 kg (228 lb)     Vital Signs with Ranges  Temp:  [96.8  F (36  C)-97.7  F (36.5  C)] 96.8  F (36  C)  Heart Rate:  [55-77] 60  Resp:  [18] 18  BP:  (129-188)/(49-86) 171/72  SpO2:  [94 %-99 %] 98 %  I/O last 3 completed shifts:  In: 520 [P.O.:520]  Out: 1950 [Urine:1850; Emesis/NG output:100]    GEN:  Alert, oriented, appears comfortable, NAD.  Pleasant..  HEENT:  Normocephalic/atraumatic, no scleral icterus, no nasal discharge, mouth moist.  CV:  Regular rate and rhythm, distant.  No loud murmur/rub.  LUNGS:  Clear to auscultation bilaterally without rales/rhonchi/wheezing/retractions.  Unable to elicit egophany. Symmetric chest rise on inhalation noted.  ABD:  Active bowel sounds, soft, slight epigastric tenderness, non-distended.  No rebound/guarding/rigidity.  EXT:  No edema.  No cyanosis.  No acute joint synovitis noted.  SKIN:  Dry to touch, no exanthems noted in the visualized areas.    Medications        piperacillin-tazobactam  3.375 g Intravenous Q6H     chlorthalidone  25 mg Oral Daily     insulin glargine  30 Units Subcutaneous At Bedtime     metoprolol  125 mg Oral Daily     insulin aspart  1-7 Units Subcutaneous TID AC     insulin aspart  1-5 Units Subcutaneous At Bedtime     sodium chloride (PF)  3 mL Intracatheter Q8H     enoxaparin  40 mg Subcutaneous Q24H     aspirin  81 mg Oral Daily     atorvastatin  80 mg Oral Daily     benztropine (COGENTIN) tablet 1 mg  1 mg Oral BID     citalopram (celeXA) half-tab 15 mg  15 mg Oral Daily     gabapentin  400 mg Oral TID     haloperidol (HALDOL) tablet 5 mg  5 mg Oral At Bedtime     melatonin  5 mg Oral At Bedtime     losartan  100 mg Oral Daily     ranitidine  300 mg Oral At Bedtime     Data       Recent Labs  Lab 09/03/17  0705 09/01/17  0700 08/31/17  0820   WBC 6.4 6.3 6.7   HGB 8.5* 8.8* 8.3*   HCT 25.8* 27.0* 25.6*   MCV 94 94 96    158 140*       Recent Labs  Lab 08/29/17  1735 08/29/17  1632 08/29/17  1529 08/29/17  1325   CULT No growth after 5 days No growth after 5 days No growth No growth after 5 days       Recent Labs  Lab 09/03/17  0705 09/02/17  0705 09/01/17  0700 08/31/17  0820  08/30/17  0738 08/29/17  1325    139 140 141 137 133   POTASSIUM 3.9 4.4 4.1 4.0 4.0 4.1   CHLORIDE 103 105 105 108 106 103   CO2 29 27 28 28 25 19*   ANIONGAP 7 7 7 5 6 11   * 96 84 58* 128* 223*   BUN 8 6* 9 11 17 23   CR 0.89 0.83 0.82 0.79 0.88 0.90   GFRESTIMATED 65 71 72 75 66 65   GFRESTBLACK 79 86 88 >90 80 78   ALLEN 9.0 8.6 9.1 8.4* 8.3* 8.9   MAG  --  2.1  --  2.1  --   --    PHOS  --  4.1  --   --   --   --    PROTTOTAL  --   --   --   --  6.6* 7.9   ALBUMIN  --   --   --   --  2.9* 3.6   BILITOTAL  --   --   --   --  0.7 0.6   ALKPHOS  --   --   --   --  93 119   AST  --   --   --   --  28 57*   ALT  --   --   --   --  34 45       Recent Labs  Lab 09/01/17  0700 08/30/17  0738   CRP 56.3* 103.0*       Recent Labs  Lab 08/29/17  1529   COLOR Yellow   APPEARANCE Clear   URINEGLC >499*   URINEBILI Negative   URINEKETONE Negative   SG 1.011   UBLD Negative   URINEPH 5.0   PROTEIN Negative   NITRITE Negative   LEUKEST Negative   RBCU <1   WBCU 2       No results found for this or any previous visit (from the past 24 hour(s)).[KP1.1]     Revision History        User Key Date/Time User Provider Type Action    > KP1.1 9/3/2017 10:08 PM Manfred Frost MD Physician Sign            Progress Notes by Estella Gunn RT at 9/2/2017  9:27 PM     Author:  Estella Gunn RT Service:  Respiratory Therapy Author Type:  Respiratory Therapist    Filed:  9/2/2017  9:28 PM Date of Service:  9/2/2017  9:27 PM Creation Time:  9/2/2017  9:27 PM    Status:  Signed :  Estella Gunn RT (Respiratory Therapist)         Pt stated she will have family bring in her home CPAP machine if she is staying another night.[JB1.1]    Estella Gunn[JB1.2]       Revision History        User Key Date/Time User Provider Type Action    > JB1.2 9/2/2017  9:28 PM Estella Gunn, RT Respiratory Therapist Sign     JB1.1 9/2/2017  9:27 PM Estella Gunn, RT Respiratory Therapist             Progress Notes by  "Manfred Frost MD at 9/2/2017  8:14 AM     Author:  Manfred Frost MD Service:  Hospitalist Author Type:  Physician    Filed:  9/2/2017  5:03 PM Date of Service:  9/2/2017  8:14 AM Creation Time:  9/2/2017  8:14 AM    Status:  Addendum :  Manfred Frost MD (Physician)         [facility]    Hospitalist Progress Note  Name: Nena Tang    MRN: [id number]  Provider:  Manfred Frost MD[KP1.1]    Assessment & Plan[KP1.2]   Summary of Stay: Nena Tang is a 57 year old female who came to attention from her Group Home on 8/29/2017 following a fall and confusion. She was brought in by two caregivers who described her as having been in \"her usual state of health\" on the day prior to admission when she went to the [facility]. On the date of admission, she did not eat breakfast and reported feeling \"dizzy\" when they found the patient on the ground. She had no evident injury and was brought to the ED due to concern for her unusual behavior.     Her past medical history is notable for schizoaffective disorder, hypertension, hyperlipidemia, coronary artery disease, obstructive sleep apnea, on CPAP, type 2 diabetes, history of polysubstance abuse for which she is currently living in a group home. She was brought in by caregivers because of a fall and confusion.      In the ED, she was found to have a fever, but initially, it appeared to be without a source. History of diarrhea, vomiting and an episode of urinary incontinence was obtained, but no cough or suspected SOB. The initial CXR was negative.     On 8/31, Ms. Tang evidently had an episode of VT that lasted at least 10 seconds, likely about a couple of minutes, possibly in different salvos. Dr. Naylor, her Hospitalist, worked her up and discussed the case with Dr. Bonner, from cardiology. CT scan that was completed to evaluate for possible PE as a trigger for VT showed a right lower and middle lobe infiltrate.     1.  Infectious Encephalopathy. " "Fever, hypoxia and confusion associated with right middle and lower lobe pneumonia[KP1.1] v atelectasis[KP1.3]. Pt is currently on Zosyn (in order to avoid medications that might contribute to prolonged QTc).[KP1.1] Will recheck Procalcitonin in am. Also treat presumed yeast vaginitis with Diflucan 150 mg x 1.[KP1.3]    2.  DM with hypoglycemia this AM:  -  Glucoses poorly controlled by distant history though recent A1C's this summer in the 7's.  She has had variable values here I believe related to her nausea/inconsistent eating.  She was hypoglycemic this AM.  I will reduce lantus to 30 units at HS and hold the 20 units with meals.  If she starts spiking as she eats more I would restart meal insulin with carb counting here based off what she is actually eating.  Continue SSI otherwise.    3.[KP1.1]  Possible[KP1.3] Episode of V tach. Some of the strips appear compatible with torsades. Dr. Naylor spoke with Cardiology, who agreed with a conservative appropach.[KP1.1] I do note that there is some question as to whether this is a \"true\" finding or artifact, as pt is very low-risk for life-threatening arrhythmia.[KP1.3]  - MG and K levels remain normal.   - EKG and Echo are fully normal and without evidence of CAD.  - pt will remain on Tele and a 12 lead should be obtained if there is any recurrence.   - question whether these tele strips could be artifactual.    4.  HTN:  -  Inadequate control since admission.  Overall though clearly on the high side.  Toprol XL increased to 125 mg daily. Will add Chlorthalidone.    5.  CAD[KP1.1] (non-obstructive)[KP1.3]  -  Continue metoprolol, ASA, statin, ARB.  No acute cardiac complaints.    6.  Hyperlipidemia:  -  Statin    7.  Schizoaffective disorder:  -  Continue daily SSRI and haldol at HS.     8.  CINDY on CPAP.    DVT Prophylaxis: Enoxaparin (Lovenox) SQ  Code Status:[KP1.1] Full Code    Disposition Plan[KP1.2]   Expected discharge in 1-2 days to prior living arrangement " once no fevers and glucoses more stable.     Entered:[KP1.1] Manfred Frost 09/02/2017[KP1.2],[KP1.1] 4:52 PM     Interval History   Pt complaining of vaginal itching with antibiotic. Documented in pt's chart, she has tolerated Fluconazole in the past.  Cough improved, less SOB. Denies fevers overnight. No problem with swallowing and pt notes her appetite is improved. Reports being up and ambulating.[KP1.2]    -Data reviewed today: I reviewed all new labs and imaging reports over the last 24 hours. I personally reviewed no images or EKG's today.[KP1.1]    Physical Exam   Temp: 97.4  F (36.3  C) Temp src: Oral BP: 155/67   Heart Rate: 67 Resp: 18 SpO2: 92 % O2 Device: None (Room air) Oxygen Delivery: 1.5 LPM  Vitals:    08/31/17 0524 09/01/17 0450 09/02/17 0455   Weight: 104.6 kg (230 lb 11.2 oz) 104.3 kg (229 lb 14.4 oz) 103.6 kg (228 lb 4.8 oz)[KP1.2]     Vital Signs with Ranges[KP1.1]  Temp:  [97  F (36.1  C)-97.7  F (36.5  C)] 97.4  F (36.3  C)  Heart Rate:  [61-70] 67  Resp:  [16-20] 18  BP: (118-174)/(46-76) 155/67  SpO2:  [90 %-99 %] 92 %  I/O last 3 completed shifts:  In: 300 [P.O.:300]  Out: 1800 [Urine:1800][KP1.2]    GEN:  Alert, oriented, appears comfortable, NAD.  Pleasant..  HEENT:  Normocephalic/atraumatic, no scleral icterus, no nasal discharge, mouth moist.  CV:  Regular rate and rhythm, distant.  No loud murmur/rub.  LUNGS:  Clear to auscultation bilaterally without rales/rhonchi/wheezing/retractions.  Unable to elicit egophany. Symmetric chest rise on inhalation noted.  ABD:  Active bowel sounds, soft, slight epigastric tenderness, non-distended.  No rebound/guarding/rigidity.  EXT:  No edema.  No cyanosis.  No acute joint synovitis noted.  SKIN:  Dry to touch, no exanthems noted in the visualized areas.[KP1.1]    Medications        chlorthalidone  25 mg Oral Daily     insulin glargine  30 Units Subcutaneous At Bedtime     metoprolol  125 mg Oral Daily     piperacillin-tazobactam  3.375 g  Intravenous Q6H     insulin aspart  1-7 Units Subcutaneous TID AC     insulin aspart  1-5 Units Subcutaneous At Bedtime     sodium chloride (PF)  3 mL Intracatheter Q8H     enoxaparin  40 mg Subcutaneous Q24H     aspirin  81 mg Oral Daily     atorvastatin  80 mg Oral Daily     benztropine (COGENTIN) tablet 1 mg  1 mg Oral BID     citalopram (celeXA) half-tab 15 mg  15 mg Oral Daily     gabapentin  400 mg Oral TID     haloperidol (HALDOL) tablet 5 mg  5 mg Oral At Bedtime     melatonin  5 mg Oral At Bedtime     losartan  100 mg Oral Daily     ranitidine  300 mg Oral At Bedtime     Data       Recent Labs  Lab 09/01/17  0700 08/31/17  0820 08/30/17  0738   WBC 6.3 6.7 10.6   HGB 8.8* 8.3* 8.6*   HCT 27.0* 25.6* 26.8*   MCV 94 96 96    140* 153       Recent Labs  Lab 08/29/17  1735 08/29/17  1632 08/29/17  1529 08/29/17  1325   CULT No growth after 4 days No growth after 4 days No growth No growth after 4 days       Recent Labs  Lab 09/02/17  0705 09/01/17  0700 08/31/17  0820 08/30/17  0738 08/29/17  1325    140 141 137 133   POTASSIUM 4.4 4.1 4.0 4.0 4.1   CHLORIDE 105 105 108 106 103   CO2 27 28 28 25 19*   ANIONGAP 7 7 5 6 11   GLC 96 84 58* 128* 223*   BUN 6* 9 11 17 23   CR 0.83 0.82 0.79 0.88 0.90   GFRESTIMATED 71 72 75 66 65   GFRESTBLACK 86 88 >90 80 78   ALLEN 8.6 9.1 8.4* 8.3* 8.9   MAG 2.1  --  2.1  --   --    PHOS 4.1  --   --   --   --    PROTTOTAL  --   --   --  6.6* 7.9   ALBUMIN  --   --   --  2.9* 3.6   BILITOTAL  --   --   --  0.7 0.6   ALKPHOS  --   --   --  93 119   AST  --   --   --  28 57*   ALT  --   --   --  34 45       Recent Labs  Lab 09/01/17  0700 08/30/17  0738   CRP 56.3* 103.0*       Recent Labs  Lab 08/29/17  1529   COLOR Yellow   APPEARANCE Clear   URINEGLC >499*   URINEBILI Negative   URINEKETONE Negative   SG 1.011   UBLD Negative   URINEPH 5.0   PROTEIN Negative   NITRITE Negative   LEUKEST Negative   RBCU <1   WBCU 2       No results found for this or any previous visit  "(from the past 24 hour(s)).[KP1.2]     Revision History        User Key Date/Time User Provider Type Action    > [N/A] 9/2/2017  5:03 PM Manfred Frost MD Physician Addend     KP1.3 9/2/2017  4:54 PM Manfred Frost MD Physician Sign     KP1.2 9/2/2017  4:52 PM Manfred Frost MD Physician      KP1.1 9/2/2017  8:14 AM Manfred Frost MD Physician             Progress Notes by Manfred Frost MD at 9/1/2017  8:44 AM     Author:  Manfred Frost MD Service:  Hospitalist Author Type:  Physician    Filed:  9/1/2017  8:22 PM Date of Service:  9/1/2017  8:44 AM Creation Time:  9/1/2017  8:44 AM    Status:  Signed :  Manfred Frost MD (Physician)         [facility]    Hospitalist Progress Note  Name: Nena Tang    MRN: [id number]  Provider:  Manfred Frost MD    Assessment & Plan   Summary of Stay: Nena Tang is a 57 year old female who[KP1.1] came to attention from her Group Home[KP1.2] on 8/29/2017[KP1.1] following a fall and confusion. She was brought in by two caregivers who described her as having been in \"her usual state of health\" on the day prior to admission when she went to the [facility]. On the date of admission, she did not eat breakfast and reported feeling \"dizzy\" when they found the patient on the ground. She had no evident injury and was brought to the ED due to concern for her unusual behavior.[KP1.2]     Her past medical history is notable for schizoaffective disorder, hypertension, hyperlipidemia, coronary artery disease, obstructive sleep apnea, on CPAP, type 2 diabetes, history of polysubstance abuse for which she is currently living in a group home. She was brought in by caregivers because of a fall and confusion.      In the ED, she was found to have a fever, but initially, it appeared to be without a source.[KP1.3] History of diarrhea, vomiting and an episode of urinary incontinence was obtained, but no cough or suspected SOB. The initial CXR was negative.     On 8/31, " Ms. Tang evidently had an episode of VT that lasted at least 10 seconds, likely about a couple of minutes, possibly in different salvos. Dr. Naylor, her Hospitalist, worked her up and discussed the case with Dr. Bonner, from cardiology.[KP1.2] CT scan[KP1.4] that was completed to evaluate for possible PE as a trigger for VT showed[KP1.2] a right lower and middle lobe infiltrate.[KP1.4]     1.[KP1.1]  Infectious Encephalopathy.[KP1.2] Fever[KP1.1], hypoxia and confusion[KP1.2] associated with right middle and lower lobe pneumonia.[KP1.1] Pt is currently on Zosyn (in order to avoid medications that might contribute to prolonged QTc).[KP1.2]     2.  DM with hypoglycemia this AM:  -  Glucoses poorly controlled by distant history though recent A1C's this summer in the 7's.  She has had variable values here I believe related to her nausea/inconsistent eating.  She was hypoglycemic this AM.  I will reduce lantus to 30 units at HS and hold the 20 units with meals.  If she starts spiking as she eats more I would restart meal insulin with carb counting here based off what she is actually eating.  Continue SSI otherwise.    3.[KP1.1]  Episode of V tach. Some of the strips appear compatible with torsades. Dr. Naylor spoke with Cardiology, who agreed with a conservative appropach.   - MG and K levels remain normal.   - EKG and Echo are fully normal and without evidence of CAD.  - pt will remain on Tele and a 12 lead should be obtained if there is any recurrence.   - question whether these tele strips could be artifactual.[KP1.2]    4.  HTN:  -[KP1.1]  Inadequate control[KP1.2] since admission.  Overall though clearly on the high side.[KP1.1]  T[KP1.2]oprol XL[KP1.1] increased[KP1.2] to 125 mg daily.[KP1.1] Will add Chlorthalidone.[KP1.2]    5.  CAD:  -  Continue metoprolol, ASA, statin, ARB.  No acute cardiac complaints.    6.  Hyperlipidemia:  -  Statin    7.  Schizoaffective disorder:  -  Continue daily SSRI and haldol at  HS[KP1.1].     8.[KP1.2]  CINDY on CPAP[KP1.1].[KP1.2]    DVT Prophylaxis: Enoxaparin (Lovenox) SQ  Code Status: Full Code    Disposition Plan   Expected discharge in 1-2 days to prior living arrangement once no fevers and glucoses more stable.     Entered: Manfred Frost 09/01/2017, 8:44 AM     Interval History[KP1.1]   Chart reviewed, pt interviewed.    Ms. Tang states she is not hungry. She is overall not feeling too badly, but does not like to breath deeply due to discomfort with cough.  I asked pt about her rhythmic bouncing of her legs, and she states that it is a side effect of the Haldol.[KP1.2]     -Data reviewed today: I reviewed all new labs and imaging reports over the last 24 hours. I personally reviewed no images or EKG's today.    Physical Exam   Temp: 98.6  F (37  C) Temp src: Axillary BP: 145/65 Pulse: 70 Heart Rate: 64 Resp: 18 SpO2: 99 % O2 Device: Nasal cannula Oxygen Delivery: 1 LPM  Vitals:    08/30/17 2340 08/31/17 0524 09/01/17 0450   Weight: 104.6 kg (230 lb 11.2 oz) 104.6 kg (230 lb 11.2 oz) 104.3 kg (229 lb 14.4 oz)     Vital Signs with Ranges  Temp:  [96.9  F (36.1  C)-99.1  F (37.3  C)] 98.6  F (37  C)  Pulse:  [70] 70  Heart Rate:  [64-79] 64  Resp:  [16-20] 18  BP: (145-167)/(61-68) 145/65  SpO2:  [75 %-100 %] 99 %  I/O last 3 completed shifts:  In: 674 [P.O.:480; I.V.:194]  Out: 1350 [Urine:1350]    GEN:  Alert, oriented, appears comfortable, NAD.[KP1.1]  Pleasant. bouncing both legs with a rate of about 3-5/second.[KP1.2]  HEENT:  Normocephalic/atraumatic, no scleral icterus, no nasal discharge, mouth moist.  CV:  Regular rate and rhythm, distant.  No loud murmur/rub.  LUNGS:  Clear to auscultation bilaterally without rales/rhonchi/wheezing/retractions.[KP1.1]  Unable to elicit egophany.[KP1.2] Symmetric chest rise on inhalation noted.  ABD:  Active bowel sounds, soft, slight epigastric tenderness, non-distended.  No rebound/guarding/rigidity.  EXT:  No edema.  No cyanosis.  No  acute joint synovitis noted.  SKIN:  Dry to touch, no exanthems noted in the visualized areas.    Medications        insulin glargine  30 Units Subcutaneous At Bedtime     metoprolol  125 mg Oral Daily     piperacillin-tazobactam  3.375 g Intravenous Q6H     insulin aspart  1-7 Units Subcutaneous TID AC     insulin aspart  1-5 Units Subcutaneous At Bedtime     sodium chloride (PF)  3 mL Intracatheter Q8H     enoxaparin  40 mg Subcutaneous Q24H     senna-docusate  1-2 tablet Oral BID     aspirin  81 mg Oral Daily     atorvastatin  80 mg Oral Daily     benztropine (COGENTIN) tablet 1 mg  1 mg Oral BID     citalopram (celeXA) half-tab 15 mg  15 mg Oral Daily     gabapentin  400 mg Oral TID     haloperidol (HALDOL) tablet 5 mg  5 mg Oral At Bedtime     melatonin  5 mg Oral At Bedtime     losartan  100 mg Oral Daily     ranitidine  300 mg Oral At Bedtime     Data       Recent Labs  Lab 09/01/17  0700 08/31/17  0820 08/30/17  0738   WBC 6.3 6.7 10.6   HGB 8.8* 8.3* 8.6*   HCT 27.0* 25.6* 26.8*   MCV 94 96 96    140* 153       Recent Labs  Lab 08/29/17  1735 08/29/17  1632 08/29/17  1529 08/29/17  1325   CULT No growth after 3 days No growth after 3 days No growth No growth after 3 days       Recent Labs  Lab 09/01/17  0700 08/31/17  0820 08/30/17  0738 08/29/17  1325    141 137 133   POTASSIUM 4.1 4.0 4.0 4.1   CHLORIDE 105 108 106 103   CO2 28 28 25 19*   ANIONGAP 7 5 6 11   GLC 84 58* 128* 223*   BUN 9 11 17 23   CR 0.82 0.79 0.88 0.90   GFRESTIMATED 72 75 66 65   GFRESTBLACK 88 >90 80 78   ALLEN 9.1 8.4* 8.3* 8.9   MAG  --  2.1  --   --    PROTTOTAL  --   --  6.6* 7.9   ALBUMIN  --   --  2.9* 3.6   BILITOTAL  --   --  0.7 0.6   ALKPHOS  --   --  93 119   AST  --   --  28 57*   ALT  --   --  34 45       Recent Labs  Lab 09/01/17  0700 08/30/17  0738   CRP 56.3* 103.0*       Recent Labs  Lab 08/29/17  1529   COLOR Yellow   APPEARANCE Clear   URINEGLC >499*   URINEBILI Negative   URINEKETONE Negative   SG 1.011    UBLD Negative   URINEPH 5.0   PROTEIN Negative   NITRITE Negative   LEUKEST Negative   RBCU <1   WBCU 2       Recent Results (from the past 24 hour(s))   CT Chest Pulmonary Embolism w Contrast    Narrative    CT CHEST PULMONARY EMBOLISM WITH CONTRAST  8/31/2017 3:53 PM     HISTORY: Hypoxia.     COMPARISON: CT abdomen/pelvis 7/13/2017.    TECHNIQUE: Thin section axial images are performed from the thoracic  inlet to the lung bases utilizing 79 mL of Isovue 370 IV contrast  without adverse event. Coronal reformatted images are also generated.  Radiation dose for this scan was reduced using automated exposure  control, adjustment of the mA and/or kV according to patient size, or  iterative reconstruction technique.    FINDINGS:     Chest: Calcified granulomas noted in the left lower lobe on series 5,  image 49. Patchy infiltrate is noted within the lower lobes and right  middle lobe concerning for pneumonia. Lungs are slightly hypoaerated.  Trace pleural effusions are present. No pericardial fluid. The heart  appears enlarged. Esophagus is unremarkable. Thyroid gland appears  normal in size where imaged. Small mediastinal and right hilar lymph  nodes are likely reactive in nature. No enlarged axillary lymph nodes.  No evidence of pulmonary embolism. Thoracic aorta is unremarkable.  Limited images upper abdomen are within normal limits. Bone window  examination demonstrates mild degenerative mid and lower thoracic  spine changes.      Impression    IMPRESSION:  1. Infiltrate and consolidation right middle lobe and lower lobes  concerning for pneumonia. Reactive mediastinal and right hilar lymph  nodes are present.  2. No evidence of pulmonary embolism. Thoracic aorta is unremarkable.    KEILA HEALY MD[KP1.1]          Revision History        User Key Date/Time User Provider Type Action    > KP1.2 9/1/2017  8:22 PM Manfred Frost MD Physician Sign     KP1.4 9/1/2017  4:58 PM Manfred Frost MD Physician      KP1.3  9/1/2017  8:49 AM Manfred Frost MD Physician      KP1.1 9/1/2017  8:44 AM Manfred Frost MD Physician                   Procedure Notes     No notes of this type exist for this encounter.         Progress Notes - Therapies (Notes from 09/01/17 through 09/04/17)      Progress Notes by Estella Gunn RT at 9/2/2017  9:27 PM     Author:  Estella Gunn RT Service:  Respiratory Therapy Author Type:  Respiratory Therapist    Filed:  9/2/2017  9:28 PM Date of Service:  9/2/2017  9:27 PM Creation Time:  9/2/2017  9:27 PM    Status:  Signed :  Estella Gunn RT (Respiratory Therapist)         Pt stated she will have family bring in her home CPAP machine if she is staying another night.[JB1.1]    Estella Gunn[JB1.2]       Revision History        User Key Date/Time User Provider Type Action    > JB1.2 9/2/2017  9:28 PM Estella Gunn RT Respiratory Therapist Sign     JB1.1 9/2/2017  9:27 PM Estella Gunn RT Respiratory Therapist

## 2017-08-29 NOTE — IP AVS SNAPSHOT
` `     [place] [address] MEDICAL SURGICAL: [phone]            Medication Administration Report for Nena Tang TINY as of 09/04/17 1512   Legend:    Given Hold Not Given Due Canceled Entry Other Actions    Time Time (Time) Time  Time-Action       Inactive    Active    Linked        Medications 08/29/17 08/30/17 08/31/17 09/01/17 09/02/17 09/03/17 09/04/17    acetaminophen (TYLENOL) tablet 650 mg  Dose: 650 mg Freq: EVERY 4 HOURS PRN Route: PO  PRN Reason: mild pain  Start: 08/29/17 1807   Admin Instructions: Alternate ibuprofen (if ordered) with acetaminophen.  Maximum acetaminophen dose from all sources = 75 mg/kg/day not to exceed 4 grams/day.      0900 (650 mg)-Given       2011 (650 mg)-Given        0036 (650 mg)-Given       1659 (650 mg)-Given        0912 (650 mg)-Given       1616 (650 mg)-Given        0046 (650 mg)-Given       0742 (650 mg)-Given        1401 (650 mg)-Given        0900 (650 mg)-Given           aspirin EC EC tablet 81 mg  Dose: 81 mg Freq: DAILY Route: PO  Start: 08/30/17 0900   Admin Instructions: DO NOT CRUSH.      0901 (81 mg)-Given        0838 (81 mg)-Given        0907 (81 mg)-Given        0743 (81 mg)-Given               0902 (81 mg)-Given        0859 (81 mg)-Given           atorvastatin (LIPITOR) tablet 80 mg  Dose: 80 mg Freq: DAILY Route: PO  Start: 08/30/17 0900     0901 (80 mg)-Given        0838 (80 mg)-Given        0907 (80 mg)-Given        0747 (80 mg)-Given               0901 (80 mg)-Given        0859 (80 mg)-Given           benzocaine-menthol (CHLORASEPTIC) 6-10 MG lozenge 1-2 lozenge  Dose: 1-2 lozenge Freq: EVERY 1 HOUR PRN Route: BU  PRN Reason: sore throat  PRN Comment: dry/sore throat without fever  Start: 08/29/17 2201    2224 (2 lozenge)-Given        0659 (1 lozenge)-Given            0906 (1 lozenge)-Given           benztropine (COGENTIN) tablet 1 mg  Dose: 1 mg Freq: 2 TIMES DAILY Route: PO  Start: 08/29/17 2100 2148 (1 mg)-Given        0901 (1 mg)-Given        2005 (1 mg)-Given        0838 (1 mg)-Given       2012 (1 mg)-Given        0907 (1 mg)-Given       2045 (1 mg)-Given        0743 (1 mg)-Given              2018 (1 mg)-Given        0900 (1 mg)-Given       2146 (1 mg)-Given        0900 (1 mg)-Given       [ ] 2100           bisacodyl (DULCOLAX) Suppository 10 mg  Dose: 10 mg Freq: DAILY PRN Route: RE  PRN Reason: constipation  Start: 08/29/17 1810   Admin Instructions: Hold for loose stools.  This is the third step of a three step constipation treatment protocol.               chlorthalidone (HYGROTON) tablet 25 mg  Dose: 25 mg Freq: DAILY Route: PO  Start: 09/02/17 0900        0743 (25 mg)-Given               0900 (25 mg)-Given        0907 (25 mg)-Given           citalopram (celeXA) half-tab 15 mg  Dose: 15 mg Freq: DAILY Route: PO  Start: 08/30/17 0900     0900 (15 mg)-Given        0838 (15 mg)-Given        0906 (15 mg)-Given        0742 (15 mg)-Given               0900 (15 mg)-Given        0901 (15 mg)-Given           enoxaparin (LOVENOX) injection 40 mg  Dose: 40 mg Freq: EVERY 24 HOURS Route: SC  Start: 08/29/17 2000   Admin Instructions: HOLD if platelet count falls below 50% of baseline or less than 100,000/ L and notify provider.     1913 (40 mg)-Given        2005 (40 mg)-Given        2011 (40 mg)-Given        2045 (40 mg)-Given        2018 (40 mg)-Given        2141 (40 mg)-Given        [ ] 2100           gabapentin (NEURONTIN) capsule 400 mg  Dose: 400 mg Freq: 3 TIMES DAILY Route: PO  Start: 08/29/17 2200    2148 (400 mg)-Given        0901 (400 mg)-Given       1553 (400 mg)-Given       2202 (400 mg)-Given        0838 (400 mg)-Given       1659 (400 mg)-Given       2128 (400 mg)-Given        0907 (400 mg)-Given       1616 (400 mg)-Given       2045 (400 mg)-Given               0743 (400 mg)-Given              1551 (400 mg)-Given       2117 (400 mg)-Given        0900 (400 mg)-Given       1622 (400 mg)-Given       2146 (400 mg)-Given        0859 (400  mg)-Given       [ ] 1600       [ ] 2200           glucose 40 % gel 15-30 g  Dose: 15-30 g Freq: EVERY 15 MIN PRN Route: PO  PRN Reason: low blood sugar  Start: 08/29/17 1706   Admin Instructions: Give 15 g for BG 51 to 69 mg/dL IF patient is conscious and able to swallow. Give 30 g for BG less than or equal to 50 mg/dL IF patient is conscious and able to swallow. Do NOT give glucose gel via enteral tube.  IF patient has enteral tube: give apple juice 120 mL (4 oz or 15 g of CHO) via enteral tube for BG 51 to 69 mg/dL.  Give apple juice 240 mL (8 oz or 30 g of CHO) via enteral tube for BG less than or equal to 50 mg/dL.    ~Oral gel is preferable for conscious and able to swallow patient.   ~IF gel unavailable or patient refuses may provide apple juice 120 mL (4 oz or 15 g of CHO). Document juice on I and O flowsheet.              Or  dextrose 50 % injection 25-50 mL  Dose: 25-50 mL Freq: EVERY 15 MIN PRN Route: IV  PRN Reason: low blood sugar  Start: 08/29/17 1706   Admin Instructions: Use if have IV access, BG less than 70 mg/dL and meet dose criteria below:  Dose if conscious and alert (or disorientated) and NPO = 25 mL  Dose if unconscious / not alert = 50 mL  Vesicant.              Or  glucagon injection 1 mg  Dose: 1 mg Freq: EVERY 15 MIN PRN Route: SC  PRN Reason: low blood sugar  PRN Comment: May repeat x 1 only  Start: 08/29/17 1706   Admin Instructions: May give SQ or IM. ONLY use glucagon IF patient has NO IV access AND is UNABLE to swallow AND blood glucose is LESS than or EQUAL to 50 mg/dL.               haloperidol (HALDOL) tablet 5 mg  Dose: 5 mg Freq: AT BEDTIME Route: PO  Start: 08/29/17 2200 2148 (5 mg)-Given        2202 (5 mg)-Given        2127 (5 mg)-Given        2045 (5 mg)-Given               2117 (5 mg)-Given        2146 (5 mg)-Given        [ ] 2200           hydrALAZINE (APRESOLINE) injection 10 mg  Dose: 10 mg Freq: EVERY 4 HOURS PRN Route: IV  PRN Reason: high blood pressure  PRN Comment:  give for SBP > 180  Start: 09/04/17 0140              ibuprofen (ADVIL/MOTRIN) tablet 600 mg  Dose: 600 mg Freq: EVERY 4 HOURS PRN Route: PO  PRN Reason: moderate pain  Start: 08/29/17 1847      1841 (600 mg)-Given        2115 (600 mg)-Given        1920 (600 mg)-Given             insulin aspart (NovoLOG) inj (RAPID ACTING)  Dose: 1-5 Units Freq: AT BEDTIME Route: SC  Start: 08/29/17 2200   Admin Instructions: MEDIUM INSULIN RESISTANCE DOSING    Do Not give Bedtime Correction Insulin if BG less than  200.   For  - 249 give 1 units.   For  - 299 give 2 units.   For  - 349 give 3 units.   For  -399 give 4 units.   For BG greater than or equal to 400 give 5 units.  Notify provider if glucose greater than or equal to 350 mg/dL after administration of correction dose.  If given at mealtime, must be administered 5 min before meal or immediately after.     (2147)-Not Given        (2202)-Not Given [C]        (2133)-Not Given        2050 (1 Units)-Given [C]               2123 (1 Units)-Given [C]        2152 (3 Units)-Given [C]        [ ] 2200           insulin aspart (NovoLOG) inj (RAPID ACTING)  Dose: 1-7 Units Freq: 3 TIMES DAILY BEFORE MEALS Route: SC  Start: 08/29/17 1707   Admin Instructions: Correction Scale - MEDIUM INSULIN RESISTANCE DOSING     Do Not give Correction Insulin if Pre-Meal BG less than 140.   For Pre-Meal  - 189 give 1 unit.   For Pre-Meal  - 239 give 2 units.   For Pre-Meal  - 289 give 3 units.   For Pre-Meal  - 339 give 4 units.   For Pre-Meal - 399 give 5 units.   For Pre-Meal -449 give 6 units  For Pre-Meal BG greater than or equal to 450 give 7 units.   To be given with prandial insulin, and based on pre-meal blood glucose.    Notify provider if glucose greater than or equal to 350 mg/dL after administration of correction dose.  If given at mealtime, must be administered 5 min before meal or immediately after.     1939 (2 Units)-Given      "   (0858)-Not Given [C]       (1203)-Not Given [C]       (1604)-Not Given [C]        (0906)-Not Given [C]       1112 (1 Units)-Given [C]       (1702)-Not Given        (0914)-Not Given [C]       1259 (2 Units)-Given [C]       1818 (1 Units)-Given        (0748)-Not Given [C]       1159 (1 Units)-Given [C]       1639 (3 Units)-Given [C]        0900 (1 Units)-Given [C]       1220 (2 Units)-Given [C]       1711 (3 Units)-Given [C]        (0920)-Not Given [C]       1137 (1 Units)-Given       [ ] 1700           insulin glargine (LANTUS) injection 30 Units  Dose: 30 Units Freq: AT BEDTIME Route: SC  Start: 08/31/17 2200   Admin Instructions: *Not for IV use, SQ only.  Do not mix with other insulins*       2127 (30 Units)-Given        2045 (30 Units)-Given               2126 (30 Units)-Given        2152 (30 Units)-Given        [ ] 2200           lidocaine (LMX4) kit  Freq: EVERY 1 HOUR PRN Route: Top  PRN Reason: pain  PRN Comment: with VAD insertion or accessing implanted port.  Start: 08/29/17 1805   Admin Instructions: Do NOT give if patient has a history of allergy to any local anesthetic or any \"deven\" product.   Apply 30 minutes prior to VAD insertion or port access.  MAX Dose:  2.5 g (  of 5 g tube)               lidocaine 1 % 1 mL  Dose: 1 mL Freq: EVERY 1 HOUR PRN Route: OTHER  PRN Comment: mild pain with VAD insertion or accessing implanted port  Start: 08/29/17 1805   Admin Instructions: Do NOT give if patient has a history of allergy to any local anesthetic or any \"deven\" product. MAX dose 1 mL subcutaneous OR intradermal in divided doses.               losartan (COZAAR) tablet 100 mg  Dose: 100 mg Freq: DAILY Route: PO  Start: 08/30/17 0900     0901 (100 mg)-Given        0838 (100 mg)-Given        0907 (100 mg)-Given        0742 (100 mg)-Given                      0900 (100 mg)-Given        0902 (100 mg)-Given           melatonin tablet 5 mg  Dose: 5 mg Freq: AT BEDTIME Route: PO  Start: 08/29/17 9525 9309 (5 " mg)-Given        2202 (5 mg)-Given        2128 (5 mg)-Given        2045 (5 mg)-Given               2117 (5 mg)-Given        2146 (5 mg)-Given        [ ] 2200           metoprolol (TOPROL-XL) 24 hr tablet 125 mg  Dose: 125 mg Freq: DAILY Route: PO  Start: 09/01/17 0900   Admin Instructions: DO NOT CRUSH. Tablet may be split in half along score line.        0907 (125 mg)-Given        0743 (125 mg)-Given               0900 (125 mg)-Given        0900 (125 mg)-Given           naloxone (NARCAN) injection 0.1-0.4 mg  Dose: 0.1-0.4 mg Freq: EVERY 2 MIN PRN Route: IV  PRN Reason: opioid reversal  Start: 08/29/17 1805   Admin Instructions: For respiratory rate LESS than or EQUAL to 8.  Partial reversal dose:  0.1 mg titrated q 2 minutes for Analgesia Side Effects Monitoring Sedation Level of 3 (frequently drowsy, arousable, drifts to sleep during conversation).Full reversal dose:  0.4 mg bolus for Analgesia Side Effects Monitoring Sedation Level of 4 (somnolent, minimal or no response to stimulation).               ondansetron (ZOFRAN-ODT) ODT tab 4 mg  Dose: 4 mg Freq: EVERY 6 HOURS PRN Route: PO  PRN Reasons: nausea,vomiting  Start: 08/29/17 1810   Admin Instructions: This is Step 1 of nausea and vomiting management.  If nausea not resolved in 15 minutes, go to Step 2 prochlorperazine (COMPAZINE). Do not push through foil backing. Peel back foil and gently remove. Place on tongue immediately. Administration with liquid unnecessary             0656 (4 mg)-Given       1400 (4 mg)-Given        0842 (4 mg)-Given                1139 (4 mg)-Given       2301 (4 mg)-Given        1026 (4 mg)-Given           Or  ondansetron (ZOFRAN) injection 4 mg  Dose: 4 mg Freq: EVERY 6 HOURS PRN Route: IV  PRN Reasons: nausea,vomiting  Start: 08/29/17 1810   Admin Instructions: This is Step 1 of nausea and vomiting management.  If nausea not resolved in 15 minutes, go to Step 2 prochlorperazine (COMPAZINE).  Irritant.      0028 (4 mg)-Given                               0936 (4 mg)-Given                                   piperacillin-tazobactam (ZOSYN) infusion 3.375 g  Dose: 3.375 g Freq: EVERY 6 HOURS Route: IV  Indications of Use: ASPIRATION PNEUMONIA,COMMUNITY ACQUIRED PNEUMONIA  Last Dose: 3.375 g (09/04/17 0418)  Start: 09/03/17 1630         1622 (3.375 g)-New Bag       2145 (3.375 g)-New Bag        0418 (3.375 g)-New Bag       (1009)-Not Given       [ ] 1630       [ ] 2230           prochlorperazine (COMPAZINE) injection 5-10 mg  Dose: 5-10 mg Freq: EVERY 6 HOURS PRN Route: IV  PRN Reasons: nausea,vomiting  Start: 08/29/17 1810   Admin Instructions: This is Step 2 of nausea and vomiting management.   If nausea not resolved in 15 minutes, give metoclopramide (REGLAN) if ordered (step 3 of nausea and vomiting management)      0052 (10 mg)-Given                      Or  prochlorperazine (COMPAZINE) tablet 5-10 mg  Dose: 5-10 mg Freq: EVERY 6 HOURS PRN Route: PO  PRN Reason: vomiting  Start: 08/29/17 1810   Admin Instructions: This is Step 2 of nausea and vomiting management.   If nausea not resolved in 15 minutes, give metoclopramide (REGLANI) if ordered (step 3 of nausea and vomiting management)             0900 (5 mg)-Given               Or  prochlorperazine (COMPAZINE) Suppository 25 mg  Dose: 25 mg Freq: EVERY 12 HOURS PRN Route: RE  PRN Reasons: nausea,vomiting  Start: 08/29/17 1810   Admin Instructions: This is Step 2 of nausea and vomiting management.   If nausea not resolved in 15 minutes, give metoclopramide (REGLAN) if ordered (step 3 of nausea and vomiting management)                             ranitidine (ZANTAC) tablet 300 mg  Dose: 300 mg Freq: AT BEDTIME Route: PO  Start: 08/29/17 2200    2148 (300 mg)-Given        2202 (300 mg)-Given        2127 (300 mg)-Given        2044 (300 mg)-Given               2117 (300 mg)-Given        2146 (300 mg)-Given        [ ] 2200           sodium chloride (OCEAN) 0.65 % nasal spray 1-2 spray  Dose: 1-2  spray Freq: EVERY 1 HOUR PRN Route: NA  PRN Reason: other  PRN Comment: dry nasal passages  Start: 08/31/17 2008   Admin Instructions: To affected nostril(s)       2143 (2 spray)-Given               sodium chloride (PF) 0.9% PF flush 3 mL  Dose: 3 mL Freq: EVERY 8 HOURS Route: IK  Start: 08/29/17 1815   Admin Instructions: And Q1H PRN, to lock peripheral IV dormant line.     (1851)-Not Given        (0133)-Not Given              (1717)-Not Given        (0129)-Not Given       (0938)-Not Given       1748 (3 mL)-Given        0159 (3 mL)-Given [C]       0916 (3 mL)-Given       (0917)-Not Given       (2023)-Not Given        (0216)-Not Given       1118 (3 mL)-Given       1724 (3 mL)-Given       2340 (3 mL)-Given        0453 (3 mL)-Given       0904 (3 mL)-Given              1711 (3 mL)-Given       2142 (3 mL)-Given        (0118)-Not Given       0419 (3 mL)-Given       1007 (3 mL)-Given       [ ] 1815           sodium chloride (PF) 0.9% PF flush 3 mL  Dose: 3 mL Freq: EVERY 1 HOUR PRN Route: IK  PRN Reason: line flush  PRN Comment: for peripheral IV flush post IV meds  Start: 08/29/17 1805     0028 (3 mL)-Given         0446 (3 mL)-Given             Completed Medications  Medications 08/29/17 08/30/17 08/31/17 09/01/17 09/02/17 09/03/17 09/04/17         Dose: 150 mg Freq: ONCE Route: PO  Indications of Use: CANDIDIASIS  Start: 09/02/17 1345   End: 09/02/17 1441        1441 (150 mg)-Given            Discontinued Medications  Medications 08/29/17 08/30/17 08/31/17 09/01/17 09/02/17 09/03/17 09/04/17         Dose: 3.375 g Freq: EVERY 6 HOURS Route: IV  Indications of Use: ASPIRATION PNEUMONIA,COMMUNITY ACQUIRED PNEUMONIA  Last Dose: 3.375 g (09/03/17 1025)  Start: 08/31/17 1650   End: 09/03/17 1504      1709 (3.375 g)-New Bag       2310 (3.375 g)-New Bag        0446 (3.375 g)-New Bag       1059 (3.375 g)-New Bag       1704 (3.375 g)-New Bag       2223 (3.375 g)-New Bag        0432 (3.375 g)-New Bag       1118 (3.375 g)-New  Bag       1721 (3.375 g)-New Bag       2258 (3.375 g)-New Bag        0453 (3.375 g)-New Bag       1025 (3.375 g)-New Bag       1504-Med Discontinued

## 2017-08-29 NOTE — TELEPHONE ENCOUNTER
Writer received a call from Alva at pt's group home.      [facility] stated that pt has had Nausea/Vomiting/Diarrhea yesterday, Pt C/O being dizzy today with similar symptoms.      Blood sugar was 143 and 218.      Group home didn't get any other VS.  Writer asked to speak with Pt.  Asked if she has been voiding normal,  Pt was groggy and couldn't answer.    Writer asked Staff about LOC changes.  Staff stated that there are some changes with being able to talk to her.      Staff at group [place] were calling for recommendations, but without staff being able to complete VS, Wt's, Orthostatics, Hx of Takotsubo's cardiomyopathy, very recent Urinary tract infection.      Writer recommended that staff call 911 for ed evaluation.    Will forward to PCP as FRANCHESCA Le RN

## 2017-08-29 NOTE — IP AVS SNAPSHOT
MRN:[id number]                      After Visit Summary   8/29/2017    Nena Tang    MRN: [id number]           Thank you!     Thank you for choosing [facility] for your care. Our goal is always to provide you with excellent care. Hearing back from our patients is one way we can continue to improve our services. Please take a few minutes to complete the written survey that you may receive in the mail after you visit. If you would like to speak to someone directly about your visit please contact Patient Relations at [phone]. Thank you!          Patient Information     Date Of Birth          1960        Designated Caregiver       Most Recent Value    Caregiver    Will someone help with your care after discharge? yes    Name of designated caregiver Zheng Barnes    Phone number of caregiver [phone]    Caregiver address [address] [place] [address] [[facility]]      About your hospital stay     You were admitted on:  August 29, 2017 You last received care in the:  [address] [facility]    You were discharged on:  September 4, 2017        Reason for your hospital stay       You had come in with confusion and fever and were found to have a pneumonia.                  Who to Call     For medical emergencies, please call 911.  For non-urgent questions about your medical care, please call your primary care provider or clinic, None          Attending Provider     Provider Specialty    Bar Alberts MD Emergency Medicine    Belgica Hall MD Internal Medicine       Primary Care Provider    None Specified      After Care Instructions     Activity       Your activity upon discharge: activity as tolerated            Diet       Follow this diet upon discharge: Regular            Monitor and record       blood glucose 4 times a day, before meals and at bedtime and notify md for blood glucose less than 70 or greater than 450                 "  Follow-up Appointments     Follow-up and recommended labs and tests        Follow up with primary care provider in 1-2 weeks to check BP control and to address diabetic management.   You will also need follow up in 6 weeks for repeat CXR.                  Your next 10 appointments already scheduled     Sep 19, 2017  9:00 AM CDT   Return Visit with ART Wilburn CNP   [facility] ([facility]    [address]  [address]  [facility] [address]   [phone]            Sep 19, 2017  9:00 AM CDT   Return Visit with Richardson Lopez [facility] ([facility])    [address]  [address]  [facility] [address]   [phone]            Nov 15, 2017 10:15 AM CST   RETURN RETINA with Tiburcio Chacon MD   [facility] ([facility]    [address]  [address]  [address]  [facility] [address]   [phone]              Pending Results     No orders found from 8/27/2017 to 8/30/2017.            Statement of Approval     Ordered          09/04/17 0930  I have reviewed and agree with all the recommendations and orders detailed in this document.  EFFECTIVE NOW     Approved and electronically signed by:  Manfred Frost MD             Admission Information     Date & Time Provider Department Dept. Phone    8/29/2017 Belgica Hall MD [place] [address] [facility] Surgical [phone]      Your Vitals Were     Blood Pressure Pulse Temperature Respirations Height Weight    138/65 70 97.6  F (36.4  C) (Oral) 18 1.549 m (5' 1\") 101.8 kg (224 lb 6.4 oz)    Last Period Pulse Oximetry BMI (Body Mass Index)             01/06/2015 97% 42.4 kg/m2         MyChart Information     [employer] gives you secure access to your electronic health record. If you see a primary care provider, you can also send messages to your care team and make appointments. " If you have questions, please call your primary care clinic.  If you do not have a primary care provider, please call [phone] and they will assist you.        Care EveryWhere ID     This is your Care EveryWhere ID. This could be used by other organizations to access your [place] medical records  [id number]        Equal Access to Services     RAMESH GARRIDO : Hadii aad ku hadpeeevie Sodelphine, waaxda luqadaha, qaybta kaalmasilvia catalan, lorena kimblekenyattasae martins. So [facility] [phone].    ATENCIÓN: Si habla español, tiene a trinh disposición servicios gratuitos de asistencia lingüística. Smith al [phone].    We comply with applicable federal civil rights laws and [place] laws. We do not discriminate on the basis of race, color, national origin, age, disability sex, sexual orientation or gender identity.               Review of your medicines      START taking        Dose / Directions    chlorthalidone 25 MG tablet   Commonly known as:  HYGROTON   Used for:  HTN, goal below 140/90        Dose:  25 mg   Take 1 tablet (25 mg) by mouth daily   Quantity:  30 tablet   Refills:  0         CONTINUE these medicines which may have CHANGED, or have new prescriptions. If we are uncertain of the size of tablets/capsules you have at home, strength may be listed as something that might have changed.        Dose / Directions    insulin aspart 100 UNIT/ML injection   Commonly known as:  NovoLOG FLEXPEN   This may have changed:  See the new instructions.   Used for:  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, with long-term current use of insulin, unspecified laterality (H)        10 units AC   Quantity:  15 mL   Refills:  1       insulin glargine 100 UNIT/ML injection   Commonly known as:  LANTUS   This may have changed:  how much to take   Used for:  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, with long-term current use of  insulin, unspecified laterality (H)        Dose:  30 Units   Inject 30 Units Subcutaneous At Bedtime   Refills:  0         CONTINUE these medicines which have NOT CHANGED        Dose / Directions    ACETAMINOPHEN PO        Dose:  1000 mg   Take 1,000 mg by mouth every 6 hours as needed for pain or fever   Refills:  0       aspirin 81 MG EC tablet   Commonly known as:  ASPIRIN LOW DOSE   Used for:  Coronary artery disease involving native heart with angina pectoris, unspecified vessel or lesion type (H)        Dose:  81 mg   Take 1 tablet (81 mg) by mouth daily   Quantity:  100 tablet   Refills:  4       atorvastatin 80 MG tablet   Commonly known as:  LIPITOR   Used for:  Hyperlipidemia LDL goal <100        TAKE 1 TABLET (80MG) BY MOUTH DAILY   Quantity:  28 tablet   Refills:  11       BENZTROPINE MESYLATE PO        Dose:  1 mg   Take 1 mg by mouth 2 times daily   Refills:  0       blood glucose monitoring lancets   Used for:  Type 2 diabetes mellitus with diabetic neuropathy, with long-term current use of insulin (H)        Use to test blood sugar 2 times daily or as directed.  Ok to substitute alternative if insurance prefers.   Quantity:  1 Box   Refills:  prn       CITALOPRAM HYDROBROMIDE PO        Dose:  15 mg   Take 15 mg by mouth daily   Refills:  0       gabapentin 300 MG capsule   Commonly known as:  NEURONTIN   Used for:  Type 2 diabetes mellitus with diabetic neuropathy, with long-term current use of insulin (H)        TAKE 2 CAPSULES (600MG) BY MOUTH THREE TIMES A DAY   Quantity:  168 capsule   Refills:  1       glucose 4 G Chew chewable tablet   Used for:  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, with long-term current use of insulin, unspecified laterality (H)        Take 2 every 15 minutes for blood sugar <70mg/dL. Recheck blood sugar every 15 minutes until above 70mg/dL, then eat a substantial meal.   Quantity:  20 tablet   Refills:  1       HALOPERIDOL PO         Dose:  5 mg   Take 5 mg by mouth At Bedtime   Refills:  0       ibuprofen 600 MG tablet   Commonly known as:  ADVIL/MOTRIN   Used for:  Closed fracture of one rib of right side, initial encounter        Dose:  600 mg   Take 1 tablet (600 mg) by mouth every 4 hours as needed for moderate pain   Quantity:  60 tablet   Refills:  0       losartan 100 MG tablet   Commonly known as:  COZAAR   Used for:  Coronary artery disease involving native heart with angina pectoris, unspecified vessel or lesion type (H)        TAKE 1 TABLET (100MG) BY MOUTH DAILY   Quantity:  90 tablet   Refills:  1       melatonin 5 MG Caps   Used for:  Insomnia, unspecified type        Dose:  1 capsule   Take 1 capsule by mouth daily At dinnertime   Quantity:  90 capsule   Refills:  1       metoprolol 100 MG 24 hr tablet   Commonly known as:  TOPROL-XL   Used for:  Coronary artery disease involving native heart with angina pectoris, unspecified vessel or lesion type (H)        TAKE 1 TABLET (100MG) BY MOUTH DAILY   Quantity:  28 tablet   Refills:  11       ONE TOUCH ULTRA test strip   Used for:  Type 2 diabetes mellitus with diabetic neuropathy, with long-term current use of insulin (H)   Generic drug:  blood glucose monitoring        TEST TWICE DAILY AS DIRECTED   Quantity:  150 strip   Refills:  8       * order for DME   Used for:  Falls frequently        Equipment being ordered: Rolling walker   Quantity:  1 Device   Refills:  0       * order for DME   Used for:  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, with long-term current use of insulin, unspecified laterality (H)        Hold Novolog if meals are refused.   Quantity:  1 each   Refills:  0       polyethylene glycol powder   Commonly known as:  MIRALAX/GLYCOLAX   Used for:  Constipation, unspecified constipation type        TAKE 17 GRAMS (1 CAPFUL) BY MOUTH DAILY   Quantity:  527 g   Refills:  3       ranitidine 300 MG tablet   Commonly  known as:  ZANTAC   Used for:  Gastroesophageal reflux disease without esophagitis        TAKE 1 TABLET (300MG) BY MOUTH AT BEDTIME   Quantity:  28 tablet   Refills:  3       senna-docusate 8.6-50 MG per tablet   Commonly known as:  SENOKOT-S;PERICOLACE        Dose:  1 tablet   Take 1 tablet by mouth 2 times daily as needed for constipation   Refills:  0       TRULICITY 1.5 MG/0.5ML pen   Used for:  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, with long-term current use of insulin, unspecified laterality (H)   Generic drug:  dulaglutide        INJECT 1.5MG SUBCUTANEOUSLY EVERY SEVEN DAYS   Quantity:  2 mL   Refills:  11       ULTICARE MINI 31G X 6 MM   Used for:  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, with long-term current use of insulin, unspecified laterality (H)   Generic drug:  insulin pen needle        USE 4 DAILY OR AS DIRECTED   Quantity:  100 each   Refills:  11       vitamin D3 38471 UNITS capsule   Commonly known as:  CHOLECALCIFEROL   Used for:  Vitamin D deficiency        TAKE 1 CAPSULE (50,000 UNITS) BY MOUTH ONCE A WEEK   Quantity:  4 capsule   Refills:  3       * Notice:  This list has 2 medication(s) that are the same as other medications prescribed for you. Read the directions carefully, and ask your doctor or other care provider to review them with you.         Where to get your medicines      These medications were sent to [facility] [address] [address]     Phone:  [phone]     chlorthalidone 25 MG tablet         Some of these will need a paper prescription and others can be bought over the counter. Ask your nurse if you have questions.     You don't need a prescription for these medications     insulin aspart 100 UNIT/ML injection    insulin glargine 100 UNIT/ML injection                Protect others around you: Learn  how to safely use, store and throw away your medicines at www.disposemymeds.org.             Medication List: This is a list of all your medications and when to take them. Check marks below indicate your daily home schedule. Keep this list as a reference.      Medications           Morning Afternoon Evening Bedtime As Needed    ACETAMINOPHEN PO   Take 1,000 mg by mouth every 6 hours as needed for pain or fever   Last time this was given:  650 mg on 9/4/2017  9:00 AM                                aspirin 81 MG EC tablet   Commonly known as:  ASPIRIN LOW DOSE   Take 1 tablet (81 mg) by mouth daily   Last time this was given:  81 mg on 9/4/2017  8:59 AM                                atorvastatin 80 MG tablet   Commonly known as:  LIPITOR   TAKE 1 TABLET (80MG) BY MOUTH DAILY   Last time this was given:  80 mg on 9/4/2017  8:59 AM                                BENZTROPINE MESYLATE PO   Take 1 mg by mouth 2 times daily   Last time this was given:  1 mg on 9/4/2017  9:00 AM                                blood glucose monitoring lancets   Use to test blood sugar 2 times daily or as directed.  Ok to substitute alternative if insurance prefers.                                chlorthalidone 25 MG tablet   Commonly known as:  HYGROTON   Take 1 tablet (25 mg) by mouth daily   Last time this was given:  25 mg on 9/4/2017  9:07 AM                                CITALOPRAM HYDROBROMIDE PO   Take 15 mg by mouth daily   Last time this was given:  15 mg on 9/4/2017  9:01 AM                                gabapentin 300 MG capsule   Commonly known as:  NEURONTIN   TAKE 2 CAPSULES (600MG) BY MOUTH THREE TIMES A DAY   Last time this was given:  400 mg on 9/4/2017  8:59 AM                                glucose 4 G Chew chewable tablet   Take 2 every 15 minutes for blood sugar <70mg/dL. Recheck blood sugar every 15 minutes until above 70mg/dL, then eat a substantial meal.                                HALOPERIDOL PO   Take 5 mg by  mouth At Bedtime   Last time this was given:  5 mg on 9/3/2017  9:46 PM                                ibuprofen 600 MG tablet   Commonly known as:  ADVIL/MOTRIN   Take 1 tablet (600 mg) by mouth every 4 hours as needed for moderate pain   Last time this was given:  600 mg on 9/2/2017  7:20 PM                                insulin aspart 100 UNIT/ML injection   Commonly known as:  NovoLOG FLEXPEN   10 units AC   Last time this was given:  1 Units on 9/4/2017 11:37 AM                                insulin glargine 100 UNIT/ML injection   Commonly known as:  LANTUS   Inject 30 Units Subcutaneous At Bedtime                                losartan 100 MG tablet   Commonly known as:  COZAAR   TAKE 1 TABLET (100MG) BY MOUTH DAILY   Last time this was given:  100 mg on 9/4/2017  9:02 AM                                melatonin 5 MG Caps   Take 1 capsule by mouth daily At dinnertime                                metoprolol 100 MG 24 hr tablet   Commonly known as:  TOPROL-XL   TAKE 1 TABLET (100MG) BY MOUTH DAILY   Last time this was given:  125 mg on 9/4/2017  9:00 AM                                ONE TOUCH ULTRA test strip   TEST TWICE DAILY AS DIRECTED   Generic drug:  blood glucose monitoring                                * order for DME   Equipment being ordered: Rolling walker                                * order for DME   Hold Novolog if meals are refused.                                polyethylene glycol powder   Commonly known as:  MIRALAX/GLYCOLAX   TAKE 17 GRAMS (1 CAPFUL) BY MOUTH DAILY                                ranitidine 300 MG tablet   Commonly known as:  ZANTAC   TAKE 1 TABLET (300MG) BY MOUTH AT BEDTIME   Last time this was given:  300 mg on 9/3/2017  9:46 PM                                senna-docusate 8.6-50 MG per tablet   Commonly known as:  SENOKOT-S;PERICOLACE   Take 1 tablet by mouth 2 times daily as needed for constipation   Last time this was given:  1 tablet on 9/1/2017  9:07 AM                                 TRULICITY 1.5 MG/0.5ML pen   INJECT 1.5MG SUBCUTANEOUSLY EVERY SEVEN DAYS   Generic drug:  dulaglutide                                ULTICARE MINI 31G X 6 MM   USE 4 DAILY OR AS DIRECTED   Generic drug:  insulin pen needle                                vitamin D3 48810 UNITS capsule   Commonly known as:  CHOLECALCIFEROL   TAKE 1 CAPSULE (50,000 UNITS) BY MOUTH ONCE A WEEK                                * Notice:  This list has 2 medication(s) that are the same as other medications prescribed for you. Read the directions carefully, and ask your doctor or other care provider to review them with you.

## 2017-08-29 NOTE — IP AVS SNAPSHOT
"` `           [place] [address] MEDICAL SURGICAL: [phone]                                              INTERAGENCY TRANSFER FORM - NURSING   2017                    Hospital Admission Date: 2017  ERIK BURNHAM   : 1960  Sex: Female        Attending Provider: Belgica Hall MD     Allergies:  Imidazole Antifungals, Ketoprofen, Lisinopril, Metformin, Metronidazole, Posaconazole    Infection:  None   Service:  GENERAL MEDI    Ht:  1.549 m (5' 1\")   Wt:  101.8 kg (224 lb 6.4 oz)   Admission Wt:  104.1 kg (229 lb 8 oz)    BMI:  42.4 kg/m 2   BSA:  2.09 m 2            Patient PCP Information     None on File      Current Code Status     Date Active Code Status Order ID Comments User Context       Prior      Code Status History     Date Active Date Inactive Code Status Order ID Comments User Context    2017  9:30 AM  Full Code [id number]  Manfred Frost MD Outpatient    2017  6:12 PM 2017  9:30 AM Full Code [id number]  Belgica Hall MD Inpatient    2016  6:02 PM 2016  4:20 PM Full Code [id number]  Estella Smith RN Inpatient    2016  9:48 AM 2016  6:02 PM Full Code [id number]  Albino Castañeda MD Outpatient    10/24/2016 10:48 PM 10/27/2016  5:39 PM Full Code [id number]  Rosa Soto RN Inpatient    2016  3:02 PM 10/24/2016 10:48 PM Full Code [id number]  Chava Barrios MD Outpatient    5/3/2016  3:27 AM 2016  3:02 PM Full Code [id number]  Roverto Fitzgerald MD Inpatient    2/15/2016  1:54 AM 2016  5:18 PM Full Code [id number]  Codie Coates MD Inpatient    2015  9:02 PM 2015  4:02 PM Full Code [id number]  Manjinder Escamilla RN Inpatient    3/7/2015  7:55 PM 3/16/2015  2:54 PM Full Code [id number]  Ada Villela RN Inpatient    2015  7:17 AM 2015  6:05 PM Full Code [id number]  Michael Brown MD Inpatient    2014  2:18 AM 2014  2:06 PM Full Code [id number]  Jean Claude Meza MD Inpatient    " 5/29/2014 10:00 AM 12/13/2014  2:18 AM Full Code [id number]  DELROY Gomez MD Outpatient    5/29/2014  1:15 AM 5/29/2014 10:00 AM Full Code [id number]  Cha Denny DO Inpatient    5/1/2014  9:00 PM 5/8/2014  1:53 PM Full Code [id number]  Karthikeyan Hooper MD Inpatient    10/2/2013  3:34 PM 10/5/2013  4:59 PM Full Code [id number]  Kae Stacy RN Inpatient    10/30/2012 11:46 AM 10/2/2013  3:34 PM Full Code [id number]  Bibi Agrawal MD Outpatient    10/28/2012 10:15 PM 10/30/2012 11:46 AM Full Code [id number]  Mag Agrawal MD Inpatient      Advance Directives        Does patient have a scanned Advance Directive/ACP document in EPIC?           No        Hospital Problems as of 9/4/2017              Priority Class Noted POA    Type 2 diabetes mellitus with mild nonproliferative retinopathy (H) Medium  8/30/2011 Yes    overweight - BMI >35 Low  Unknown Yes    Restless legs syndrome (RLS) Low  2/29/2012 Yes    Schizoaffective disorder, depressive type (H) Medium  2/25/2013 Yes    Sepsis (H) Medium  8/29/2017 Yes    Pneumonia of right lower lobe due to infectious organism (H) Medium  9/4/2017 Unknown    Infectious encephalopathy Medium  9/4/2017 Unknown    Non-intractable vomiting with nausea Medium  9/4/2017 Unknown    Acute respiratory failure with hypoxia (H) Medium  9/4/2017 Unknown      Non-Hospital Problems as of 9/4/2017              Priority Class Noted    Osteopenia Low  10/7/2009    Vitamin B12 deficiency without anemia Low  11/23/2009    Hyperlipidemia LDL goal <100 Medium  10/31/2010    Moderate major depression (H) High  6/8/2011    Rotator cuff syndrome Medium  7/6/2011    Illiterate Medium  8/30/2011    Irritable bowel syndrome Low  Unknown    Takotsubo cardiomyopathy Low  Unknown    CAD (coronary artery disease) High  2/29/2012    CINDY (obstructive sleep apnea)- mild AHI 10.3 Medium  3/8/2012    Verbal auditory hallucination Medium  10/4/2012    Chronic low back pain Medium  1/22/2013     Migraine headache Medium  4/22/2013    HTN, goal below 140/90 Medium  7/29/2013    Health Care Home Medium  8/16/2013    Lumbago Medium  9/20/2013    Cervicalgia Medium  9/20/2013    Cocaine abuse, episodic Medium  10/3/2013    Suicidal ideation Medium  5/1/2014    Esophageal reflux Medium  6/9/2014    Mild nonproliferative diabetic retinopathy (H) Medium  6/19/2014    Tardive dyskinesia Medium  9/11/2015    Alcohol use Medium  4/19/2016    Left cataract Medium  7/25/2016    Falls frequently Medium  8/18/2016    History of uterine cancer Medium  12/7/2016    Depression Medium  12/28/2016    Psychophysiological insomnia Medium  3/9/2017    Dysuria Medium  6/12/2017    Asymptomatic postmenopausal status Medium  6/28/2017    Abdominal pain, right lower quadrant Medium  7/13/2017      Immunizations     Name Date      Influenza (IIV3) 09/24/12     Influenza (IIV3) 10/17/11     Influenza (IIV3) 10/06/11     Influenza (IIV3) 02/10/11     Influenza (IIV3) 01/22/09     Influenza Vaccine IM 3yrs+ 4 Valent IIV4 09/16/16     Influenza Vaccine IM 3yrs+ 4 Valent IIV4 02/16/16     Influenza Vaccine IM 3yrs+ 4 Valent IIV4 10/03/13     [place] 07/10/14     Pneumococcal 23 valent 01/22/09     TDAP Vaccine (Adacel) 01/22/09          END      ASSESSMENT     Discharge Profile Flowsheet     EXPECTED DISCHARGE     GI Signs/Symptoms  nausea 09/03/17 1150    Expected Discharge Date  09/02/17 ( GH) 08/31/17 1057   Passing flatus  yes 09/04/17 0303    DISCHARGE NEEDS ASSESSMENT     COMMUNICATION ASSESSMENT      Patient/family verbalizes understanding of discharge plan recommendations?  Yes 08/31/17 1632   Patient's communication style  spoken language (English or Bilingual) 08/29/17 1908    Medical Team notified of plan?  yes 08/31/17 1632   FINAL RESOURCES      Anticipated Changes Related to Illness  none 08/31/17 1632   Resources List  Home Care;Other (Comments) (mental health , day treatment, mental health OT) 03/06/17 0910     "Equipment Currently Used at Home  cane, straight 08/31/17 1632   Other Resources  Group Home 08/31/17 1632    Transportation Available  -- ( staff can ) 08/31/17 1632   Group Home Agency  [facility] 08/31/17 1632    # of Referrals Placed by [place]  [facility];Scheduled Follow-up appointments 05/04/16 1503   Group Home Contact Info  [employer]heng low[employer]  ([phone] or [phone]) 08/31/17 1632    ASSESSMENT OF FUNCTIONAL STATUS     Group Home Status  Active 08/31/17 1632    Prior to admission patient needed assistance with:  Medications;Meal preparation;Laundry/Housekeeping;Shopping;Transportation 08/31/17 1632   SKIN      GASTROINTESTINAL (ADULT,PEDIATRIC,OB)     Inspection of bony prominences  Full 09/03/17 2215    GI WDL  ex 09/03/17 2215   Skin WDL  WDL 09/04/17 0303    Abdominal Appearance  obese 09/04/17 0303   SAFETY      All Quadrants Bowel Sounds  audible and active in all quadrants 09/04/17 0303   Safety WDL  WDL 09/04/17 0303    Last Bowel Movement  09/01/17 09/04/17 0303   Safety Factors  wheels locked;call light in reach;upper side rails raised x 2;ID band on 09/03/17 2215                 Assessment WDL (Within Defined Limits) Definitions           Safety WDL     Effective: 09/28/15    Row Information: <b>WDL Definition:</b> Bed in low position, wheels locked; call light in reach; upper side rails up x 2; ID band on<br> <font color=\"gray\"><i>Item=AS safety wdl>>List=AS safety wdl>>Version=F14</i></font>      Skin WDL     Effective: 09/28/15    Row Information: <b>WDL Definition:</b> Warm; dry; intact; elastic; without discoloration; pressure points without redness<br> <font color=\"gray\"><i>Item=AS skin wdl>>List=AS skin wdl>>Version=F14</i></font>      Vitals     Vital Signs Flowsheet     VITAL SIGNS     ANALGESIA SIDE EFFECTS MONITORING      Temp  97.6  F (36.4  C) 09/04/17 0834   Side Effects Monitoring: Respiratory Quality  R 09/03/17 1633    Temp src  Oral 09/04/17 0834   Side Effects " "Monitoring: Respiratory Depth  N 09/03/17 1633    Resp  18 09/04/17 0834   Side Effects Monitoring: Sedation Level  1 09/03/17 1633    Pulse  70 09/01/17 0811   HEIGHT AND WEIGHT      Heart Rate  62 09/04/17 0834   Height  1.549 m (5' 1\") 08/29/17 1320    Pulse/Heart Rate Source  Monitor 09/03/17 1633   Weight  101.8 kg (224 lb 6.4 oz) 09/04/17 0147    BP  138/65 09/04/17 0834   Weight Method  Standing scale 09/03/17 0451    BP Location  Left arm 09/04/17 0151   BSA (Calculated - sq m)  2.12 08/29/17 1320    OXYGEN THERAPY     BMI (Calculated)  43.45 08/29/17 1320    SpO2  97 % 09/04/17 0834   EZEKIEL COMA SCALE      O2 Device  None (Room air) 09/04/17 0834   Best Eye Response  4-->(E4) spontaneous 09/03/17 1633    Oxygen Delivery  1.5 LPM 09/03/17 0757   Best Motor Response  6-->(M6) obeys commands 09/03/17 1633    PAIN/COMFORT     Best Verbal Response  5-->(V5) oriented (forgetful) 09/03/17 1703    Patient Currently in Pain  denies 09/04/17 0049   Ezekiel Coma Scale Score  15 09/03/17 1633    Preferred Pain Scale  number (Numeric Rating Pain Scale) 09/04/17 0049   POSITIONING      Patient's Stated Pain Goal  No pain 09/03/17 0946   Body Position  independently positioning 09/04/17 0303    0-10 Pain Scale  0 09/03/17 0946   Head of Bed (HOB)  HOB at 30-45 degrees 09/04/17 0303    Word Pain Scale  2 08/31/17 0851   DAILY CARE      Pain Location  Head 09/02/17 0736   Activity Type  activity adjusted per tolerance 09/04/17 0303    Pain Orientation  Mid 09/01/17 2118   Activity Level of Assistance  assistance, stand-by 09/04/17 0303    Pain Descriptors  Headache 09/02/17 0753   POINT OF CARE TESTING      Pain Management Interventions  analgesia administered 09/01/17 2118   Puncture Site  fingertip 09/03/17 0137    Pain Intervention(s)  Medication (See eMAR) 09/02/17 0753   Bedside Glucose (mg/dl )   219 mg/dl 09/03/17 0137    Response to Interventions  Relief 08/31/17 0851                 Patient " Lines/Drains/Airways Status    Active LINES/DRAINS/AIRWAYS     Name: Placement date: Placement time: Site: Days: Last dressing change:    Incision/Surgical Site 12/26/16 Right Hand 12/26/16   1113    252     Incision/Surgical Site 06/30/17 Right Eye 06/30/17   0738    66             Patient Lines/Drains/Airways Status    Active PICC/CVC     None            Intake/Output Detail Report     Date Intake     Output   Net    Shift P.O. I.V. IV Piggyback Total Urine Emesis/NG output Total       Day 09/03/17 0700 - 09/03/17 1459 -- -- -- -- 700 100 800 -800    Rdaha 09/03/17 1500 - 09/03/17 2259 840 -- -- 840 1700 -- 1700 -860    Noc 09/03/17 2300 - 09/04/17 0659 -- -- -- -- 475 -- 475 -475    Day 09/04/17 0700 - 09/04/17 1459 240 3 -- 243 -- -- -- 243    Radha 09/04/17 1500 - 09/04/17 2259 -- -- -- -- -- -- -- 0      Last Void/BM       Most Recent Value    Urine Occurrence 2 at 09/04/2017 1200    Stool Occurrence 1 at 09/01/2017 1100      Case Management/Discharge Planning     Case Management/Discharge Planning Flowsheet     REFERRAL INFORMATION     COPING/STRESS      Did the Initial Social Work Assessment result in a Social Work Case?  Yes 08/31/17 1632   Major Change/Loss/Stressor  none 08/29/17 1912    Admission Type  inpatient 08/31/17 1632   EXPECTED DISCHARGE      Arrived From  home or self-care 08/31/17 1632   Expected Discharge Date  09/02/17 ( ) 08/31/17 1057    Referral Source  physician 08/31/17 1632   DISCHARGE PLANNING      # of Referrals Placed by [facility]  Homecare;Scheduled Follow-up appointments 05/04/16 1503   Patient/family verbalizes understanding of discharge plan recommendations?  Yes 08/31/17 1632    Reason For Consult  discharge planning 08/31/17 1632   Medical Team notified of plan?  yes 08/31/17 1632    Record Reviewed  history and physical;medical record 08/31/17 1632   Anticipated Changes Related to Illness  none 08/31/17 1632     Assigned to Case  corinne 08/31/17 1632   Transportation  Available  -- ( staff can ) 08/31/17 1632    Primary Care MD Name  LEVY Macdonald 08/31/17 1632   FINAL RESOURCES      LIVING ENVIRONMENT     Equipment Currently Used at Home  cane, straight 08/31/17 1632    Lives With  facility resident 08/31/17 1632   Resources List  Home Care;Other (Comments) (mental health , day treatment, mental health OT) 03/06/17 0910    Living Arrangements  group home 08/31/17 1632   Other Resources  Group Home 08/31/17 1632    Provides Primary Care For  no one 08/31/17 1632   Group Home Agency  [facility] 08/31/17 1632    Quality Of Family Relationships  supportive 08/31/17 1632   Group Home Contact Info  [employer]  ([phone] or [phone]) 08/31/17 1632    Able to Return to Prior Living Arrangements  yes 08/31/17 1632   Group Home Status  Active 08/31/17 1632    HOME SAFETY     ABUSE RISK SCREEN      Patient Feels Safe Living in Home?  yes 08/31/17 1632   QUESTION TO PATIENT:  Has a member of your family or a partner(now or in the past) intimidated, hurt, manipulated, or controlled you in any way?  no 08/29/17 1324    ASSESSMENT OF FAMILY/SOCIAL SUPPORT     QUESTION TO PATIENT: Do you feel safe going back to the place where you are living?  yes 08/29/17 1324    Marital Status   08/31/17 1632   OBSERVATION: Is there reason to believe there has been maltreatment of a vulnerable adult (ie. Physical/Sexual/Emotional abuse, self neglect, lack of adequate food, shelter, medical care, or financial exploitation)?  no 08/29/17 1324    Who is your support system?  Children;Facility resident(s)/Staff 08/31/17 1632   (R) MENTAL HEALTH SUICIDE RISK      Description of Support System  Supportive;Involved 08/31/17 1632   Are you depressed or being treated for depression?  No 09/04/17 0952    Support Assessment  Adequate family and caregiver support;Adequate social supports 08/31/17 1632   HOMICIDE RISK      ASSESSMENT OF FUNCTIONAL STATUS     Homicidal Ideation  no  08/29/17 1324    Prior to admission patient needed assistance with:  Medications;Meal preparation;Laundry/Housekeeping;Shopping;Transportation 08/31/17 7678

## 2017-08-29 NOTE — ED PROVIDER NOTES
History     Chief Complaint:  Fall and Confusion    HPI - Limited secondary to confusion  Nena Tang is a diabetic 57 year old female with a history of drug abuse, CAD, HTN, schizoaffective disorder, among others, who presents with her care giver for evaluation after a fall of confusion and altered status. The patient declined breakfast this morning. Her blood sugar was around 260 and dropped to 218. She reported dizziness afterwards. A staff member left her room and came back a few minutes later finding the patient on the ground. They believe that a fall had occurred. The care giver states that she has not been acting her normal self since then and seems very confused. She had an episode of emesis. He does note that she has been hydrating herself today. Of note, the patient did have diarrhea yesterday and an episode of incontinence in a car yesterday. He does not recall any cough or fever recently.     Allergies:  Imidazole  Ketoprofen  Lisinopril  Metformin  Metronidazole  Posaconazole    Medications:    Neurontin  lipitor  Toprol  Nitrofurantonin  Mapap  Miralax  Novolog  Zantac  Cogentin  Haldol  Quixin  Pred Forte  Lantus Solostar  Hydrocortisone  Cholecalciferol  Dok Plus  Cozaar  Trulicity  Quixin  Pred Forte  Ulticare  Aspirin  Ibuprofen  Melatonin  Glucose  Celexa    Past Medical History:    CAD  Chronic lower back pain  Cocaine abuse, in remission  Fecal urgency  Heroin abuse  Hyperlipidemia  HTN  Illiterate  Irritable bowel syndrome  Left cataract  Migraine  Moderate major depression  Noncompliance with medication regimen  Obesity  CINDY  Osteopenia  Schizoaffective disorder  Suicidal intent  Takotsubo cardiomyopathy  Type 2 Diabetes Mellitis  Uterine cancer  Verbal auditory hallucination  Vitamin B12 deficiency without anemia  Rotator cuff syndrome  Lumbago  Esophageal reflux  Tardive dyskinesia    Past Surgical History:    Oophorectomy   Cataract Iol  Hysterectomy  Laparoscopic  "cholecystectomy  Phacoemulsification clear cornea with standard intraocular lens implant  Release trigger finger    Family History:    Mother: Bladder cancer, COPD, cirrhosis of liver, alcohol/drug abuse, HTN, lipids, CAD, glaucoma    Social History:  Heroin/cocaine abuse  Alcohol use positive  Marital Status:       Review of Systems   Constitutional: Negative for fever.   Respiratory: Negative for cough.    Gastrointestinal: Positive for diarrhea and vomiting.   Neurological: Positive for dizziness.   Psychiatric/Behavioral: Positive for confusion.   All other systems reviewed and are negative.      Physical Exam   First Vitals:     /69  Pulse 100  Temp 102.7  F (39.3  C) (Oral)  Resp 16  Ht 1.549 m (5' 1\")  Wt 104.1 kg (229 lb 8 oz)  LMP 01/06/2015  SpO2 98%  BMI 43.36 kg/m2     Physical Exam  Constitutional: Alert, attentive, baseline cognitive delay  HENT:    Nose: Nose normal.    Mouth/Throat: Oropharynx is clear, mucous membranes are moist   Eyes: EOM are normal. Pupils are equal, round, and reactive to light.   CV: tachycardic, regular rhythm; no murmurs, rubs or gallups  Chest: Effort normal and breath sounds normal.   GI:  There is no tenderness. No distension. Normal bowel sounds  MSK: Normal range of motion.   Neurological: Alert, attentive  Skin: Skin is warm and dry.   Groin and intertriginous area clear of reported candidiasis      Emergency Department Course     Imaging:  XR Chest 2 Views  Enlarged cardiac silhouette is again seen and unchanged. Pulmonary vascular congestion is noted. No focal airspace disease, pleural effusion or pneumothorax.  As per radiology.    CT Head w/o Contrast  Normal CT scan of the head.   As per radiology.    Laboratory:  UA with micro: GLC <499, Mucous present o/w negative  Urine Culture: In process    15:10 Lactic Acid: 3.0 (H)  13:34 ISTAT Gases Lactate: Ph 7.37, PCO2 40, PO2 39, Bicarbonate 24, O2 Sat 71, Lactic Acid 3.6 (H)    CBC: WBC: 13.6 (H), " HGB: 10.5 (H), PLT: 214  CMP: Glucose 223 (H), AST 57 (H), o/w WNL (Creatinine: 0.90)    Glucose by Meter: 224 (H)    Interventions:  13:39 0.9% Sodium Chloride 1,000 mL IV  14:03 0.9% Sodium Chloride 1,000 mL IV  15:36 Rocephin 1 g IV  16:06 Zofran 4 mg IV   Azithromycin 500 mg IV    Emergency Department Course:  Nursing notes and vitals reviewed.  I performed an exam of the patient as documented above.     The patient was sent for a Chest Xray and Head CT while in the emergency department, findings above.   The patient provided a urine sample here in the emergency department. This was sent for laboratory testing, findings above.   Blood drawn. This was sent to the lab for further testing, results above.    IV inserted. Medicine administered as documented above.     15:02 I rechecked the patient.  15:20 I reevaluated the patient and provided an update in regards to her ED course to the care giver.    15:24 I consulted with Dr. Hall about the patient's history and plan. Dr. Hall has agreed to accept that patient to the hospital.  15:31 I rechecked the patient and discussed plan and admittance after talking with Dr. Hall.    Repeat lactic improved.    Findings and plan explained to the care giver who consents to admission. Discussed the patient with Dr. Hall, who will admit the patient to a med/surg bed for further monitoring, evaluation, and treatment.      Impression & Plan      CMS Diagnoses:   The patient has signs of Severe Sepsis as evidenced by:    1. 2 SIRS criteria, AND  2. Suspected infection, AND   3. Organ dysfunction: Lactic Acid >2    Time severe sepsis diagnosis confirmed = 1334 as this was the time when Lactate resulted, and the level was >2      3 Hour Severe Sepsis Bundle Completion:  1. Initial Lactic Acid Result:   Recent Labs   Lab Test  08/29/17   1510  08/29/17   1334  01/24/17 1957   LACT  3.0*  3.6*  1.5     2. Blood Cultures before Antibiotics: Yes  3. Broad Spectrum Antibiotics  Administered: Yes     Ceftriaxone      6 Hour Severe Sepsis Bundle Completion:  1. Repeat Lactic Acid Level: 3.0        Medical Decision Making:  Nena Tang is a 57 year old female with complex medical history who presents for evaluation of confusion and vomiting, found today to also have hypoxia and fever. There is no definite pneumonia on xray but given the above and absence of other source, she has been covered for community acquired pneumonia. Aspiration PNA is considered. UA is negative, and there is no evidence of cellulitis. The remainder of the work up shows leukocytosis, stable anemia, elevated lactic acid consistent with severe sepsis, and normal CT scan of the head (performed given possible fall). She has been resuscitated in the department and repeat lactic is improved. I believe she is safe for admission at this time.      ICD-10-CM   1. Severe sepsis (H) A41.9    R65.20   2. Acute respiratory failure with hypoxia (H) J96.01   3. Nausea and vomiting, intractability of vomiting not specified, unspecified vomiting type R11.2   4. Hyperglycemia R73.9     Critical care time: independent of procedures, was 35 minutes.        Disposition:  Admitted to inpatient       I, Rob Abdi, am serving as a scribe on 8/29/2017 at 1:13 PM to personally document services performed by Bar Alberts MD based on my observations and the provider's statements to me.     8/29/2017   [facility] EMERGENCY DEPARTMENT            Bar Alberts MD  08/29/17 0739

## 2017-08-30 ENCOUNTER — APPOINTMENT (OUTPATIENT)
Dept: SPEECH THERAPY | Facility: CLINIC | Age: 57
DRG: 871 | End: 2017-08-30
Attending: HOSPITALIST
Payer: MEDICARE

## 2017-08-30 LAB
ALBUMIN SERPL-MCNC: 2.9 G/DL (ref 3.4–5)
ALP SERPL-CCNC: 93 U/L (ref 40–150)
ALT SERPL W P-5'-P-CCNC: 34 U/L (ref 0–50)
ANION GAP SERPL CALCULATED.3IONS-SCNC: 6 MMOL/L (ref 3–14)
AST SERPL W P-5'-P-CCNC: 28 U/L (ref 0–45)
BACTERIA SPEC CULT: NO GROWTH
BASOPHILS # BLD AUTO: 0 10E9/L (ref 0–0.2)
BASOPHILS NFR BLD AUTO: 0.1 %
BILIRUB SERPL-MCNC: 0.7 MG/DL (ref 0.2–1.3)
BUN SERPL-MCNC: 17 MG/DL (ref 7–30)
C COLI+JEJUNI+LARI FUSA STL QL NAA+PROBE: NOT DETECTED
C DIFF TOX B STL QL: NEGATIVE
CALCIUM SERPL-MCNC: 8.3 MG/DL (ref 8.5–10.1)
CHLORIDE SERPL-SCNC: 106 MMOL/L (ref 94–109)
CO2 SERPL-SCNC: 25 MMOL/L (ref 20–32)
CREAT SERPL-MCNC: 0.88 MG/DL (ref 0.52–1.04)
CRP SERPL-MCNC: 103 MG/L (ref 0–8)
DIFFERENTIAL METHOD BLD: ABNORMAL
EC STX1 GENE STL QL NAA+PROBE: NOT DETECTED
EC STX2 GENE STL QL NAA+PROBE: NOT DETECTED
ENTERIC PATHOGEN COMMENT: NORMAL
EOSINOPHIL # BLD AUTO: 0.2 10E9/L (ref 0–0.7)
EOSINOPHIL NFR BLD AUTO: 1.9 %
ERYTHROCYTE [DISTWIDTH] IN BLOOD BY AUTOMATED COUNT: 13.2 % (ref 10–15)
GFR SERPL CREATININE-BSD FRML MDRD: 66 ML/MIN/1.7M2
GLUCOSE BLDC GLUCOMTR-MCNC: 102 MG/DL (ref 70–99)
GLUCOSE BLDC GLUCOMTR-MCNC: 103 MG/DL (ref 70–99)
GLUCOSE BLDC GLUCOMTR-MCNC: 109 MG/DL (ref 70–99)
GLUCOSE BLDC GLUCOMTR-MCNC: 124 MG/DL (ref 70–99)
GLUCOSE BLDC GLUCOMTR-MCNC: 157 MG/DL (ref 70–99)
GLUCOSE SERPL-MCNC: 128 MG/DL (ref 70–99)
HBA1C MFR BLD: 7.6 % (ref 4.3–6)
HCT VFR BLD AUTO: 26.8 % (ref 35–47)
HGB BLD-MCNC: 8.6 G/DL (ref 11.7–15.7)
IMM GRANULOCYTES # BLD: 0 10E9/L (ref 0–0.4)
IMM GRANULOCYTES NFR BLD: 0.3 %
LYMPHOCYTES # BLD AUTO: 1.9 10E9/L (ref 0.8–5.3)
LYMPHOCYTES NFR BLD AUTO: 17.8 %
Lab: NORMAL
MCH RBC QN AUTO: 30.9 PG (ref 26.5–33)
MCHC RBC AUTO-ENTMCNC: 32.1 G/DL (ref 31.5–36.5)
MCV RBC AUTO: 96 FL (ref 78–100)
MONOCYTES # BLD AUTO: 0.7 10E9/L (ref 0–1.3)
MONOCYTES NFR BLD AUTO: 6.9 %
NEUTROPHILS # BLD AUTO: 7.8 10E9/L (ref 1.6–8.3)
NEUTROPHILS NFR BLD AUTO: 73 %
NOROV GI+II ORF1-ORF2 JNC STL QL NAA+PR: NOT DETECTED
NRBC # BLD AUTO: 0 10*3/UL
NRBC BLD AUTO-RTO: 0 /100
PLATELET # BLD AUTO: 153 10E9/L (ref 150–450)
POTASSIUM SERPL-SCNC: 4 MMOL/L (ref 3.4–5.3)
PROCALCITONIN SERPL-MCNC: 3.27 NG/ML
PROT SERPL-MCNC: 6.6 G/DL (ref 6.8–8.8)
RBC # BLD AUTO: 2.78 10E12/L (ref 3.8–5.2)
RVA NSP5 STL QL NAA+PROBE: NOT DETECTED
SALMONELLA SP RPOD STL QL NAA+PROBE: NOT DETECTED
SHIGELLA SP+EIEC IPAH STL QL NAA+PROBE: NOT DETECTED
SODIUM SERPL-SCNC: 137 MMOL/L (ref 133–144)
SPECIMEN SOURCE: NORMAL
SPECIMEN SOURCE: NORMAL
V CHOL+PARA RFBL+TRKH+TNAA STL QL NAA+PR: NOT DETECTED
WBC # BLD AUTO: 10.6 10E9/L (ref 4–11)
Y ENTERO RECN STL QL NAA+PROBE: NOT DETECTED

## 2017-08-30 PROCEDURE — 25000132 ZZH RX MED GY IP 250 OP 250 PS 637: Mod: GY | Performed by: INTERNAL MEDICINE

## 2017-08-30 PROCEDURE — 99232 SBSQ HOSP IP/OBS MODERATE 35: CPT | Performed by: HOSPITALIST

## 2017-08-30 PROCEDURE — 92526 ORAL FUNCTION THERAPY: CPT | Mod: GN

## 2017-08-30 PROCEDURE — 87493 C DIFF AMPLIFIED PROBE: CPT | Performed by: INTERNAL MEDICINE

## 2017-08-30 PROCEDURE — 87506 IADNA-DNA/RNA PROBE TQ 6-11: CPT | Performed by: INTERNAL MEDICINE

## 2017-08-30 PROCEDURE — 12000000 ZZH R&B MED SURG/OB

## 2017-08-30 PROCEDURE — 92610 EVALUATE SWALLOWING FUNCTION: CPT | Mod: GN

## 2017-08-30 PROCEDURE — 80053 COMPREHEN METABOLIC PANEL: CPT | Performed by: INTERNAL MEDICINE

## 2017-08-30 PROCEDURE — A9270 NON-COVERED ITEM OR SERVICE: HCPCS | Mod: GY | Performed by: INTERNAL MEDICINE

## 2017-08-30 PROCEDURE — 86140 C-REACTIVE PROTEIN: CPT | Performed by: INTERNAL MEDICINE

## 2017-08-30 PROCEDURE — 25000131 ZZH RX MED GY IP 250 OP 636 PS 637: Mod: GY | Performed by: INTERNAL MEDICINE

## 2017-08-30 PROCEDURE — 00000146 ZZHCL STATISTIC GLUCOSE BY METER IP

## 2017-08-30 PROCEDURE — 84145 PROCALCITONIN (PCT): CPT | Performed by: INTERNAL MEDICINE

## 2017-08-30 PROCEDURE — 25000125 ZZHC RX 250: Performed by: INTERNAL MEDICINE

## 2017-08-30 PROCEDURE — 25000128 H RX IP 250 OP 636: Performed by: INTERNAL MEDICINE

## 2017-08-30 PROCEDURE — 36415 COLL VENOUS BLD VENIPUNCTURE: CPT | Performed by: INTERNAL MEDICINE

## 2017-08-30 PROCEDURE — 83036 HEMOGLOBIN GLYCOSYLATED A1C: CPT | Performed by: INTERNAL MEDICINE

## 2017-08-30 PROCEDURE — 85025 COMPLETE CBC W/AUTO DIFF WBC: CPT | Performed by: INTERNAL MEDICINE

## 2017-08-30 PROCEDURE — 40000225 ZZH STATISTIC SLP WARD VISIT

## 2017-08-30 PROCEDURE — 86140 C-REACTIVE PROTEIN: CPT | Performed by: HOSPITALIST

## 2017-08-30 PROCEDURE — 99207 ZZC CDG-MDM COMPONENT: MEETS LOW - DOWN CODED: CPT | Performed by: HOSPITALIST

## 2017-08-30 RX ORDER — ACETAMINOPHEN 10 MG/ML
1000 INJECTION, SOLUTION INTRAVENOUS ONCE
Status: COMPLETED | OUTPATIENT
Start: 2017-08-30 | End: 2017-08-30

## 2017-08-30 RX ADMIN — SENNOSIDES AND DOCUSATE SODIUM 1 TABLET: 8.6; 5 TABLET ORAL at 09:01

## 2017-08-30 RX ADMIN — Medication 1 LOZENGE: at 06:59

## 2017-08-30 RX ADMIN — ONDANSETRON 4 MG: 4 TABLET, ORALLY DISINTEGRATING ORAL at 06:56

## 2017-08-30 RX ADMIN — GABAPENTIN 400 MG: 400 CAPSULE ORAL at 22:02

## 2017-08-30 RX ADMIN — GABAPENTIN 400 MG: 400 CAPSULE ORAL at 15:53

## 2017-08-30 RX ADMIN — RANITIDINE 300 MG: 150 TABLET ORAL at 22:02

## 2017-08-30 RX ADMIN — METOPROLOL SUCCINATE 100 MG: 100 TABLET, FILM COATED, EXTENDED RELEASE ORAL at 09:01

## 2017-08-30 RX ADMIN — LOSARTAN POTASSIUM 100 MG: 100 TABLET ORAL at 09:01

## 2017-08-30 RX ADMIN — CITALOPRAM HYDROBROMIDE 15 MG: 10 TABLET ORAL at 09:00

## 2017-08-30 RX ADMIN — ONDANSETRON 4 MG: 4 TABLET, ORALLY DISINTEGRATING ORAL at 14:00

## 2017-08-30 RX ADMIN — GABAPENTIN 400 MG: 400 CAPSULE ORAL at 09:01

## 2017-08-30 RX ADMIN — ACETAMINOPHEN 1000 MG: 10 INJECTION, SOLUTION INTRAVENOUS at 01:04

## 2017-08-30 RX ADMIN — BENZTROPINE MESYLATE 1 MG: 1 TABLET ORAL at 09:01

## 2017-08-30 RX ADMIN — INSULIN GLARGINE 50 UNITS: 100 INJECTION, SOLUTION SUBCUTANEOUS at 22:02

## 2017-08-30 RX ADMIN — Medication 5 MG: at 22:02

## 2017-08-30 RX ADMIN — SENNOSIDES AND DOCUSATE SODIUM 1 TABLET: 8.6; 5 TABLET ORAL at 20:05

## 2017-08-30 RX ADMIN — SODIUM CHLORIDE, POTASSIUM CHLORIDE, SODIUM LACTATE AND CALCIUM CHLORIDE: 600; 310; 30; 20 INJECTION, SOLUTION INTRAVENOUS at 00:43

## 2017-08-30 RX ADMIN — ACETAMINOPHEN 650 MG: 325 TABLET, FILM COATED ORAL at 09:00

## 2017-08-30 RX ADMIN — HALOPERIDOL 5 MG: 5 TABLET ORAL at 22:02

## 2017-08-30 RX ADMIN — BENZTROPINE MESYLATE 1 MG: 1 TABLET ORAL at 20:05

## 2017-08-30 RX ADMIN — PROCHLORPERAZINE EDISYLATE 10 MG: 5 INJECTION INTRAMUSCULAR; INTRAVENOUS at 00:52

## 2017-08-30 RX ADMIN — ONDANSETRON 4 MG: 2 INJECTION INTRAMUSCULAR; INTRAVENOUS at 00:28

## 2017-08-30 RX ADMIN — ATORVASTATIN CALCIUM 80 MG: 40 TABLET, FILM COATED ORAL at 09:01

## 2017-08-30 RX ADMIN — PROCHLORPERAZINE MALEATE 5 MG: 5 TABLET, FILM COATED ORAL at 09:00

## 2017-08-30 RX ADMIN — ASPIRIN 81 MG: 81 TABLET, COATED ORAL at 09:01

## 2017-08-30 RX ADMIN — ENOXAPARIN SODIUM 40 MG: 40 INJECTION SUBCUTANEOUS at 20:05

## 2017-08-30 RX ADMIN — ACETAMINOPHEN 650 MG: 325 TABLET, FILM COATED ORAL at 20:11

## 2017-08-30 NOTE — PLAN OF CARE
Problem: Goal Outcome Summary  Goal: Goal Outcome Summary  Discharge Planner SLP   Patient plan for discharge: none stated  Current status: Bedside swallow eval completed today. Pt presents with mild oropharyngeal dysphagia. Recommend pt initiate dysphagia diet 3 and thin liquids. Pt should be fully upright and alert for all PO, take small single sips/bites, pace self, alternate consistencies, and remain upright for 30-60 minutes following PO. Hold PO should pt demonstrate overt s/sx of aspiration.  Barriers to return to prior living situation: dysphagia   Recommendations for discharge: anticipate pt will meet goals prior to discharge  Rationale for recommendations: suspect pt is close to tolerating baseline diet level        Entered by: Jolie Sharp 08/30/2017 2:15 PM

## 2017-08-30 NOTE — PROGRESS NOTES
" 08/30/17 1350   General Information   Onset Date 08/29/17   Start of Care Date 08/30/17   Referring Physician Mason Hernandez MD   Patient Profile Review/OT: Additional Occupational Profile Info See Profile for full history and prior level of function   Patient/Family Goals Statement Pt did not state   Swallowing Evaluation Bedside swallow evaluation   Behaviorial Observations WFL (within functional limits)   Mode of current nutrition Oral diet   Type of oral diet Thin liquid;Other (Comment)  (clears)   Respiratory Status O2 Supply   Type of O2 supply Nasal cannula  (2L)   Comments Ms. Nena Tang is a 57-year-old female who has a past medical history notable for schizoaffective disorder, hypertension, hyperlipidemia, coronary artery disease, obstructive sleep apnea, on CPAP, type 2 diabetes, history of polysubstance abuse, currently living in a group home situation, who is brought in by caregivers because of a fall and confusion. Per discussion with RN, pts group home reports pt coughs \"all the time\" with PO. Pt with limited PO intake this date d/t nausea and emesis. Pt states she typically coughs when eating. Bedside swallow eval completed per MD orders to further assess oropharyngeal swallow function.    Clinical Swallow Evaluation   Oral Musculature generally intact   Structural Abnormalities none present   Dentition other (see comments);present and adequate  (missing some back molars)   Mucosal Quality adequate   Mandibular Strength and Mobility intact   Oral Labial Strength and Mobility WFL   Lingual Strength and Mobility WFL   Velar Elevation intact   Buccal Strength and Mobility intact   Laryngeal Function Cough;Throat clear;Swallow;Voicing initiated   Additional Documentation Yes   Clinical Swallow Eval: Thin Liquid Texture Trial   Mode of Presentation, Thin Liquids cup;self-fed   Volume of Liquid or Food Presented 8 oz   Oral Phase of Swallow WFL   Pharyngeal Phase of Swallow intact   Diagnostic " Statement Pt tolerated thin liquids via cup with no overt s/sx of aspiration    Clinical Swallow Eval: Puree Solid Texture Trial   Mode of Presentation, Puree spoon;self-fed   Volume of Puree Presented 3 tbsp   Oral Phase, Puree WFL   Pharyngeal Phase, Puree intact   Diagnostic Statement Pt tolerated pureed textures via spoon with no overt s/sx of aspiration    Clinical Swallow Eval: Semisolid Texture Trial   Mode of Presentation, Semisolid spoon;self-fed   Volume of Semisolid Food Presented 2 tbsp   Oral Phase, Semisolid WFL   Pharyngeal Phase, Semisolid intact   Diagnostic Statement Pt tolerated semisolid textures with no overt s/sx of aspiration    Clinical Swallow Eval: Solid Food Texture Trial   Mode of Presentation, Solid self-fed   Volume of Solid Food Presented 1 cracker   Oral Phase, Solid other (see comments)  (prolonged but functional time for mastication )   Pharyngeal Phase, Solid intact   Diagnostic Statement Regular solid textures resulted in prolonged but functional time for mastication; no overt s/sx of aspiration    VFSS Evaluation   VFSS Additional Documentation No   FEES Evaluation   Additional Documentation No   Swallow Compensations   Swallow Compensations Alternate viscosity of consistencies;Pacing;Reduce amounts   Results No difficulties noted   Esophageal Phase of Swallow   Patient reports or presents with symptoms of esophageal dysphagia No   General Therapy Interventions   Planned Therapy Interventions Dysphagia Treatment   Dysphagia treatment Compensatory strategies for swallowing;Instruction of safe swallow strategies;Modified diet education   Swallow Eval: Clinical Impressions   Skilled Criteria for Therapy Intervention Skilled criteria met.  Treatment indicated.   Functional Assessment Scale (FAS) 5   Treatment Diagnosis Mild oropharyngeal dysphagia   Diet texture recommendations Dysphagia diet level 3;Thin liquids   Recommended Feeding/Eating Techniques alternate between small bites  and sips of food/liquid;hard swallow w/ each bite or sip;maintain upright posture during/after eating for 30 mins;small sips/bites   Demonstrates Need for Referral to Another Service dietitian   Therapy Frequency 5 times/wk   Predicted Duration of Therapy Intervention (days/wks) 1 week   Anticipated Discharge Disposition other (see comments)  (return to group home)   Risks and Benefits of Treatment have been explained. Yes   Patient, family and/or staff in agreement with Plan of Care Yes   Clinical Impression Comments SLP: Bedside swallow eval completed per MD orders. Pt presents with mild oropharyngeal dysphagia. Pt endorses pain with swallowing this date. Pt tolerated thin liquids via cup, pureed textures, semisolid textures and regular solid textures with no overt s/sx of aspiration. Regular solid textures resulted in mildly prolonged but functional time for mastication. Recommend pt initiate dysphagia diet 3 and thin liquids. Pt should be fully upright and alert for all PO, take small single sips/bites, pace self, alternate consistencies, and remain upright for 30-60 minutes following PO. Hold PO should pt demonstrate overt s/sx of aspiration. ST to continue to follow for diet tolerance and advanced trials as appropriate. Anticipate pt will meet goals prior to discharge.    Total Evaluation Time   Total Evaluation Time (Minutes) 10

## 2017-08-30 NOTE — PLAN OF CARE
Problem: Goal Outcome Summary  Goal: Goal Outcome Summary  Outcome: No Change  Pt was admitted to our unit at 1715 due to sepsis. Blood pressure slightly high and last temp was 100.2. On 2L of oxygen nasal cannula. Tele in place and pt was in sinus rhythm in 80s at 2016. Held dinner insulin due to patient only eating a jello. Gave HS lantus due to BS of 199. No pain noted. A&O to self and at times can remember she is in a hospital which is not per baseline. Up with 2 assist, baseline SBA with cane. Group home member helped with her medication information and admission flowsheet. Continue to monitor and call patient's son with any updates.

## 2017-08-30 NOTE — PROGRESS NOTES
[facility]    Hospitalist Progress Note    Date of Service (when I saw the patient): 08/30/2017  Provider:  Mason Hernandez MD     Initial presenting complaint/issue to hospital (Diagnosis): Fall and confusion  Assessment & Plan   Nena Tang is a 57 year old female who was admitted on 8/29/2017.  She resides in a group home and was brought to the emergency department for assessment of confusion and possible fall happening earlier in the day.    1.  Fever, leukocytosis and elevated CRP.  Leukocytosis with neutrophilia on arrival, counts are normal today.  Preliminary pro-calcitonin undetectable, today's  test still in process.  Her only localizing sign is fritz, however had chest x-rays negative.  Patient does believe she has pneumonia.  I would continue empiric antibiotic.  2. Type 2 diabetes mellitus  3.  Essential hypertension  4.  Hyperlipidemia.  5.  CINDY on home  CPAP  6.  History of coronary artery disease.  7.  Schizoaffective disorder.      Plan  -inpatient  -Rocephin Zithromax per protocol  -Diabetic diet  - PTA dose of Lantus, pre-meal insulin and sliding scale.  -Continue home medication including losartan, metoprolol, ranitidine, atorvastatin, etc.  -Weight for blood culture results.    DVT Prophylaxis: Enoxaparin (Lovenox) SQ  Code Status: Full Code    Disposition: Expected discharge in a couple of days once afebrile and clear etiology elicited.    Interval History   Her only symptom is cough, she feels better, she has no appetite at this point.  She denies nausea, vomiting or diarrhea.  No urinary symptoms.    -Data reviewed today: I reviewed all new labs and imaging results over the last 24 hours. I personally reviewed the EKG tracing showing Sinus rhythm in monitor.    Physical Exam   Temp: 98.4  F (36.9  C) Temp src: Oral BP: 139/68 Pulse: 110 Heart Rate: 73 Resp: 18 SpO2: 98 % O2 Device: Nasal cannula Oxygen Delivery: 2 LPM  Vitals:    08/29/17 1318   Weight: 104.1 kg (229 lb 8  oz)     Vital Signs with Ranges  Temp:  [98.4  F (36.9  C)-101.8  F (38.8  C)] 98.4  F (36.9  C)  Pulse:  [110] 110  Heart Rate:  [73-86] 73  Resp:  [18-20] 18  BP: (128-179)/(48-83) 139/68  SpO2:  [96 %-98 %] 98 %  I/O last 3 completed shifts:  In: 2937 [P.O.:320; I.V.:2617]  Out: 300 [Emesis/NG output:300]    GEN:  Alert, oriented x 3, appears comfortable, NAD.  HEENT:  Normocephalic/atraumatic, no scleral icterus, no nasal discharge, mouth moist.  CV:  Regular rate and rhythm, no murmur or JVD.  S1 + S2 noted, no S3 or S4.  LUNGS:  Clear to auscultation bilaterally without rales/rhonchi/wheezing/retractions.  Symmetric chest rise on inhalation noted.  ABD:  Active bowel sounds, soft, non-tender/non-distended.  No rebound/guarding/rigidity.  EXT:  No edema or cyanosis.  No joint synovitis noted.  SKIN:  Dry to touch, no exanthems noted in the visualized areas.       Medications        insulin aspart  1-7 Units Subcutaneous TID AC     insulin aspart  1-5 Units Subcutaneous At Bedtime     sodium chloride (PF)  3 mL Intracatheter Q8H     enoxaparin  40 mg Subcutaneous Q24H     senna-docusate  1-2 tablet Oral BID     aspirin  81 mg Oral Daily     atorvastatin  80 mg Oral Daily     benztropine (COGENTIN) tablet 1 mg  1 mg Oral BID     citalopram (celeXA) half-tab 15 mg  15 mg Oral Daily     gabapentin  400 mg Oral TID     haloperidol (HALDOL) tablet 5 mg  5 mg Oral At Bedtime     insulin glargine  50 Units Subcutaneous At Bedtime     melatonin  5 mg Oral At Bedtime     losartan  100 mg Oral Daily     metoprolol  100 mg Oral Daily     insulin aspart  20 Units Subcutaneous TID w/meals     ranitidine  300 mg Oral At Bedtime       Data     Recent Labs  Lab 08/30/17  0738 08/29/17  1325   WBC 10.6 13.6*   HGB 8.6* 10.5*   MCV 96 97    214    133   POTASSIUM 4.0 4.1   CHLORIDE 106 103   CO2 25 19*   BUN 17 23   CR 0.88 0.90   ANIONGAP 6 11   ALLEN 8.3* 8.9   * 223*   ALBUMIN 2.9* 3.6   PROTTOTAL 6.6* 7.9    BILITOTAL 0.7 0.6   ALKPHOS 93 119   ALT 34 45   AST 28 57*       No results found for this or any previous visit (from the past 24 hour(s)).          Disclaimer: This note consists of symbols derived from keyboarding, dictation and/or voice recognition software. As a result, there may be errors in the script that have gone undetected. Please consider this when interpreting information found in this chart.

## 2017-08-30 NOTE — PLAN OF CARE
Problem: Goal Outcome Summary  Goal: Goal Outcome Summary  Outcome: No Change  Patient alert to self only.  Up w/ A-1 to commode.  VSS except temp 100.8.  Tylenol given.  Zofran and compazine given to control nausea.  Insulin held, on clear liquid diet but patient not eating/drinking much.  Coughing when taking sips of water, MD paged regarding ordering a swallow study.  Started on DD3 Diet w/ thin liquids.   and 109.  C-Diff results negative.  Continues on 2L NC.   SW Consult ordered.  Will continue to monitor.

## 2017-08-30 NOTE — PROGRESS NOTES
Infection Prevention:    Patient placed on Enteric precautions because of loose stool with possible infectious etiology. Please contact Infection Prevention with any questions/concerns at *[phone].    Sharri Lopez, ICP

## 2017-08-30 NOTE — PLAN OF CARE
Problem: Nausea/Vomiting (Adult)  Goal: Identify Related Risk Factors and Signs and Symptoms  Related risk factors and signs and symptoms are identified upon initiation of Human Response Clinical Practice Guideline (CPG)  Outcome: Declining  Tmax 101.8, IV Tylenol x1, 99.3 at recheck. BPmax 179/83, improved to 136/70 with resolution of fever. HR tachy to 110. Pt reports pain, but unable to explain where. Remains only oriented to self & lethargic. x2 emesis, over 300 ml, at times appeared to be aspirating while vomiting, IV Zofran x1 & IV Compazine x1. BS hyperactive, pt passing gas, x1 loose stool. Pt up with assist of 2, very weak, alarm on, incontinent of urine. Recheck labs today & enteric panel pending.

## 2017-08-31 ENCOUNTER — APPOINTMENT (OUTPATIENT)
Dept: CT IMAGING | Facility: CLINIC | Age: 57
DRG: 871 | End: 2017-08-31
Attending: INTERNAL MEDICINE
Payer: MEDICARE

## 2017-08-31 ENCOUNTER — APPOINTMENT (OUTPATIENT)
Dept: CARDIOLOGY | Facility: CLINIC | Age: 57
DRG: 871 | End: 2017-08-31
Attending: INTERNAL MEDICINE
Payer: MEDICARE

## 2017-08-31 ENCOUNTER — TELEPHONE (OUTPATIENT)
Dept: FAMILY MEDICINE | Facility: CLINIC | Age: 57
End: 2017-08-31

## 2017-08-31 LAB
ANION GAP SERPL CALCULATED.3IONS-SCNC: 5 MMOL/L (ref 3–14)
BASOPHILS # BLD AUTO: 0 10E9/L (ref 0–0.2)
BASOPHILS NFR BLD AUTO: 0.1 %
BUN SERPL-MCNC: 11 MG/DL (ref 7–30)
CALCIUM SERPL-MCNC: 8.4 MG/DL (ref 8.5–10.1)
CHLORIDE SERPL-SCNC: 108 MMOL/L (ref 94–109)
CO2 SERPL-SCNC: 28 MMOL/L (ref 20–32)
CREAT SERPL-MCNC: 0.79 MG/DL (ref 0.52–1.04)
DIFFERENTIAL METHOD BLD: ABNORMAL
EOSINOPHIL # BLD AUTO: 0.3 10E9/L (ref 0–0.7)
EOSINOPHIL NFR BLD AUTO: 4 %
ERYTHROCYTE [DISTWIDTH] IN BLOOD BY AUTOMATED COUNT: 13.2 % (ref 10–15)
GFR SERPL CREATININE-BSD FRML MDRD: 75 ML/MIN/1.7M2
GLUCOSE BLDC GLUCOMTR-MCNC: 121 MG/DL (ref 70–99)
GLUCOSE BLDC GLUCOMTR-MCNC: 134 MG/DL (ref 70–99)
GLUCOSE BLDC GLUCOMTR-MCNC: 140 MG/DL (ref 70–99)
GLUCOSE BLDC GLUCOMTR-MCNC: 170 MG/DL (ref 70–99)
GLUCOSE BLDC GLUCOMTR-MCNC: 63 MG/DL (ref 70–99)
GLUCOSE BLDC GLUCOMTR-MCNC: 99 MG/DL (ref 70–99)
GLUCOSE SERPL-MCNC: 58 MG/DL (ref 70–99)
HCT VFR BLD AUTO: 25.6 % (ref 35–47)
HGB BLD-MCNC: 8.3 G/DL (ref 11.7–15.7)
IMM GRANULOCYTES # BLD: 0 10E9/L (ref 0–0.4)
IMM GRANULOCYTES NFR BLD: 0.3 %
LYMPHOCYTES # BLD AUTO: 2 10E9/L (ref 0.8–5.3)
LYMPHOCYTES NFR BLD AUTO: 29.9 %
MAGNESIUM SERPL-MCNC: 2.1 MG/DL (ref 1.6–2.3)
MCH RBC QN AUTO: 31.2 PG (ref 26.5–33)
MCHC RBC AUTO-ENTMCNC: 32.4 G/DL (ref 31.5–36.5)
MCV RBC AUTO: 96 FL (ref 78–100)
MONOCYTES # BLD AUTO: 0.6 10E9/L (ref 0–1.3)
MONOCYTES NFR BLD AUTO: 8.5 %
NEUTROPHILS # BLD AUTO: 3.8 10E9/L (ref 1.6–8.3)
NEUTROPHILS NFR BLD AUTO: 57.2 %
NRBC # BLD AUTO: 0 10*3/UL
NRBC BLD AUTO-RTO: 0 /100
PLATELET # BLD AUTO: 140 10E9/L (ref 150–450)
POTASSIUM SERPL-SCNC: 4 MMOL/L (ref 3.4–5.3)
RBC # BLD AUTO: 2.66 10E12/L (ref 3.8–5.2)
SODIUM SERPL-SCNC: 141 MMOL/L (ref 133–144)
TROPONIN I SERPL-MCNC: <0.015 UG/L (ref 0–0.04)
TROPONIN I SERPL-MCNC: <0.015 UG/L (ref 0–0.04)
WBC # BLD AUTO: 6.7 10E9/L (ref 4–11)

## 2017-08-31 PROCEDURE — 99207 ZZC CDG-MDM COMPONENT: MEETS LOW - DOWN CODED: CPT | Performed by: INTERNAL MEDICINE

## 2017-08-31 PROCEDURE — 25000131 ZZH RX MED GY IP 250 OP 636 PS 637: Mod: GY | Performed by: INTERNAL MEDICINE

## 2017-08-31 PROCEDURE — 36415 COLL VENOUS BLD VENIPUNCTURE: CPT | Performed by: INTERNAL MEDICINE

## 2017-08-31 PROCEDURE — 93306 TTE W/DOPPLER COMPLETE: CPT

## 2017-08-31 PROCEDURE — A9270 NON-COVERED ITEM OR SERVICE: HCPCS | Mod: GY | Performed by: INTERNAL MEDICINE

## 2017-08-31 PROCEDURE — 25000125 ZZHC RX 250: Performed by: INTERNAL MEDICINE

## 2017-08-31 PROCEDURE — 80048 BASIC METABOLIC PNL TOTAL CA: CPT | Performed by: HOSPITALIST

## 2017-08-31 PROCEDURE — 25000132 ZZH RX MED GY IP 250 OP 250 PS 637: Mod: GY | Performed by: INTERNAL MEDICINE

## 2017-08-31 PROCEDURE — 40000275 ZZH STATISTIC RCP TIME EA 10 MIN

## 2017-08-31 PROCEDURE — 00000146 ZZHCL STATISTIC GLUCOSE BY METER IP

## 2017-08-31 PROCEDURE — 84484 ASSAY OF TROPONIN QUANT: CPT | Performed by: INTERNAL MEDICINE

## 2017-08-31 PROCEDURE — 85025 COMPLETE CBC W/AUTO DIFF WBC: CPT | Performed by: HOSPITALIST

## 2017-08-31 PROCEDURE — 99232 SBSQ HOSP IP/OBS MODERATE 35: CPT | Performed by: INTERNAL MEDICINE

## 2017-08-31 PROCEDURE — 83735 ASSAY OF MAGNESIUM: CPT | Performed by: HOSPITALIST

## 2017-08-31 PROCEDURE — 40000556 ZZH STATISTIC PERIPHERAL IV START W US GUIDANCE

## 2017-08-31 PROCEDURE — 25000128 H RX IP 250 OP 636: Performed by: INTERNAL MEDICINE

## 2017-08-31 PROCEDURE — 71260 CT THORAX DX C+: CPT

## 2017-08-31 PROCEDURE — 12000000 ZZH R&B MED SURG/OB

## 2017-08-31 PROCEDURE — 93005 ELECTROCARDIOGRAM TRACING: CPT

## 2017-08-31 PROCEDURE — 93306 TTE W/DOPPLER COMPLETE: CPT | Mod: 26 | Performed by: INTERNAL MEDICINE

## 2017-08-31 PROCEDURE — 36415 COLL VENOUS BLD VENIPUNCTURE: CPT | Performed by: HOSPITALIST

## 2017-08-31 RX ORDER — METOPROLOL SUCCINATE 25 MG/1
25 TABLET, EXTENDED RELEASE ORAL ONCE
Status: COMPLETED | OUTPATIENT
Start: 2017-08-31 | End: 2017-08-31

## 2017-08-31 RX ORDER — IOPAMIDOL 755 MG/ML
500 INJECTION, SOLUTION INTRAVASCULAR ONCE
Status: COMPLETED | OUTPATIENT
Start: 2017-08-31 | End: 2017-08-31

## 2017-08-31 RX ADMIN — ENOXAPARIN SODIUM 40 MG: 40 INJECTION SUBCUTANEOUS at 20:11

## 2017-08-31 RX ADMIN — Medication 5 MG: at 21:28

## 2017-08-31 RX ADMIN — SALINE NASAL SPRAY 2 SPRAY: 1.5 SOLUTION NASAL at 21:43

## 2017-08-31 RX ADMIN — BENZTROPINE MESYLATE 1 MG: 1 TABLET ORAL at 08:38

## 2017-08-31 RX ADMIN — GABAPENTIN 400 MG: 400 CAPSULE ORAL at 08:38

## 2017-08-31 RX ADMIN — CITALOPRAM HYDROBROMIDE 15 MG: 10 TABLET ORAL at 08:38

## 2017-08-31 RX ADMIN — LOSARTAN POTASSIUM 100 MG: 100 TABLET ORAL at 08:38

## 2017-08-31 RX ADMIN — SENNOSIDES AND DOCUSATE SODIUM 1 TABLET: 8.6; 5 TABLET ORAL at 08:38

## 2017-08-31 RX ADMIN — ACETAMINOPHEN 650 MG: 325 TABLET, FILM COATED ORAL at 16:59

## 2017-08-31 RX ADMIN — TAZOBACTAM SODIUM AND PIPERACILLIN SODIUM 3.38 G: 375; 3 INJECTION, SOLUTION INTRAVENOUS at 17:09

## 2017-08-31 RX ADMIN — IBUPROFEN 600 MG: 600 TABLET ORAL at 18:41

## 2017-08-31 RX ADMIN — INSULIN GLARGINE 30 UNITS: 100 INJECTION, SOLUTION SUBCUTANEOUS at 21:27

## 2017-08-31 RX ADMIN — METOPROLOL SUCCINATE 100 MG: 100 TABLET, FILM COATED, EXTENDED RELEASE ORAL at 08:38

## 2017-08-31 RX ADMIN — INSULIN ASPART 1 UNITS: 100 INJECTION, SOLUTION INTRAVENOUS; SUBCUTANEOUS at 11:12

## 2017-08-31 RX ADMIN — HALOPERIDOL 5 MG: 5 TABLET ORAL at 21:27

## 2017-08-31 RX ADMIN — RANITIDINE 300 MG: 150 TABLET ORAL at 21:27

## 2017-08-31 RX ADMIN — SENNOSIDES AND DOCUSATE SODIUM 1 TABLET: 8.6; 5 TABLET ORAL at 20:12

## 2017-08-31 RX ADMIN — TAZOBACTAM SODIUM AND PIPERACILLIN SODIUM 3.38 G: 375; 3 INJECTION, SOLUTION INTRAVENOUS at 23:10

## 2017-08-31 RX ADMIN — SODIUM CHLORIDE 90 ML: 9 INJECTION, SOLUTION INTRAVENOUS at 15:42

## 2017-08-31 RX ADMIN — GABAPENTIN 400 MG: 400 CAPSULE ORAL at 16:59

## 2017-08-31 RX ADMIN — ATORVASTATIN CALCIUM 80 MG: 40 TABLET, FILM COATED ORAL at 08:38

## 2017-08-31 RX ADMIN — ACETAMINOPHEN 650 MG: 325 TABLET, FILM COATED ORAL at 00:36

## 2017-08-31 RX ADMIN — ASPIRIN 81 MG: 81 TABLET, COATED ORAL at 08:38

## 2017-08-31 RX ADMIN — METOPROLOL SUCCINATE 25 MG: 25 TABLET, EXTENDED RELEASE ORAL at 11:10

## 2017-08-31 RX ADMIN — BENZTROPINE MESYLATE 1 MG: 1 TABLET ORAL at 20:12

## 2017-08-31 RX ADMIN — IOPAMIDOL 79 ML: 755 INJECTION, SOLUTION INTRAVENOUS at 15:41

## 2017-08-31 RX ADMIN — GABAPENTIN 400 MG: 400 CAPSULE ORAL at 21:28

## 2017-08-31 RX ADMIN — ONDANSETRON 4 MG: 4 TABLET, ORALLY DISINTEGRATING ORAL at 08:42

## 2017-08-31 NOTE — PLAN OF CARE
Problem: Goal Outcome Summary  Goal: Goal Outcome Summary  ST session cancelled. Attempted to see pt for dysphagia follow up, however per discussion with RN pt not appropriate for participation d/t requiring cardiac work-up.

## 2017-08-31 NOTE — PROGRESS NOTES
Notified of episode of over 10 seconds Vtach.  This occurred midday and resolved back to sinus rhythm on its own.  The patient had no complaints at the time and felt her normal self.  She has had no chest pain other than her occasional abdomen or back pain when she coughs.  She also denies SOB when up moving around though is still borderline hypoxic.  I checked her electrolytes this AM and her K+ was 4.0 and magnesium 2.  An EKG done now doesn't show any dramatic ST elevation/depression or major QT prolongation despite her baseline haldol use.  I will check a troponin STAT.  Further plan to follow once it is back.    Addendum:  Troponin normal range.  Patient continues to have no new complaints.  She is still hypoxic though even when trying to wean her.  I will check a CT chest PE protocol as a small PE could cause hypoxia and her arrhythmia.  An echo will also be checked.  I asked her RN to have a nurse go with the patient and for her to remain on tele when she goes down for the scan.    Addendum 1620:  -  Echo result:     Left ventricular systolic function is normal.  The visual ejection fraction is estimated at 60-65%.  Right ventricular systolic pressure is elevated, consistent with mild  pulmonary hypertension. This is new compared to 2014.  The study was technically difficult.    CT Chest:  IMPRESSION:  1. Infiltrate and consolidation right middle lobe and lower lobes  concerning for pneumonia. Reactive mediastinal and right hilar lymph  nodes are present.  2. No evidence of pulmonary embolism. Thoracic aorta is unremarkable.    Impression/Plan:  1.  Pneumonia:  It appears despite the negative x-rays, the patient does have a brewing right lung pneumonia.  Given the fact her group home reports frequent coughing with food and the location of this pneumonia I think this very well might be aspiration.  I will therefore start zosyn IV q6.  I would prefer to avoid azithromycin/levofloxacin for now since this appears  potentially aspiration.  They also can prolong QT with the haldol that she chronically takes though it should be noted her QT is reasonable right now.   Continued modified diet, speech is following.    2.  Vtach episode:  -  The etiology of the Vtach episode is unclear.  It was asymptomatic and has not further occurred.  Her electrolytes are WNL, EKG, troponin, and echo are not consistent with a clear cause.  I also checked a CT chest for PE and it was negative for clot.  The right sided pressure on the echo is most likely related to her pulmonary issues and sleep apnea.  Plan at this point is telemetry monitoring and I will recheck one more troponin later this evening.  If further Vtach events or any new cardiac complaints I would consult cardiology.

## 2017-08-31 NOTE — PROGRESS NOTES
[facility]    Hospitalist Progress Note  Name: Nena Tang    MRN: [id number]  Provider:  Garry Naylor DO, FHM (Text Page)    Assessment & Plan   Summary of Stay: Nena Tang is a 57 year old female who was admitted on 8/29/2017.  She lives in a group home and presented with some increased confusion, possible fall, and fever.  She quickly improved and after workup there is not a clear source of infection.    1.  Fever, elevated CRP on presentation of unclear etiology:  -  Off abx still and no fevers overnight.  Prior studies and cultures don't reveal a clear source.  She had a recent mild dry cough that could represent a viral illness but there is no infiltrate/pneumonia noted on imaging.  I agree with her remaining off abx for now.  I would monitor her one more day here given her presentation watching for fevers or other deterioration as she did get abx the first day that might have masked something yesterday.  -  No major injuries noted from possible preadmission fall    2.  CINDY on CPAP:  -  On 1 liter oxygen with sats 95-98%.  I asked RN to try to stop this.  They reported she dips into the upper 80's at night but this doesn't surprise me given the sleep apnea history.    3.  DM with hypoglycemia this AM:  -  Glucoses poorly controlled by distant history though recent A1C's this summer in the 7's.  She has had variable values here I believe related to her nausea/inconsistent eating.  She was hypoglycemic this AM.  I will reduce lantus to 30 units at HS and hold the 20 units with meals.  If she starts spiking as she eats more I would restart meal insulin with carb counting here based off what she is actually eating.  Continue SSI otherwise.    4.  HTN:  -  Again variable control since admission with spikes.  Overall though clearly on the high side.  I increased her toprol XL to 125 mg daily.    5.  CAD:  -  Continue metoprolol, ASA, statin, ARB.  No acute cardiac complaints.    6.   Hyperlipidemia:  -  Statin    7.  Schizoaffective disorder:  -  Continue daily SSRI and haldol at HS    DVT Prophylaxis: Enoxaparin (Lovenox) SQ  Code Status: Full Code    Disposition Plan   Expected discharge in 1-2 days to prior living arrangement once no fevers and glucoses more stable.     Entered: Garry Naylor 08/31/2017, 10:21 AM     Interval History   Assumed care for the day, history reviewed.  Ms. Tang reports overall feeling better.  Has a mild dry cough.  No chest pain, nausea, diarrhea today.  Prior nausea resolved and she tolerated breakfast.      -Data reviewed today: I reviewed all new labs and imaging reports over the last 24 hours. I personally reviewed no images or EKG's today.    Physical Exam   Temp: 98.2  F (36.8  C) Temp src: Oral BP: 160/68   Heart Rate: 74 Resp: 18 SpO2: 95 % O2 Device: Nasal cannula Oxygen Delivery: 1 LPM  Vitals:    08/29/17 1318 08/30/17 2340 08/31/17 0524   Weight: 104.1 kg (229 lb 8 oz) 104.6 kg (230 lb 11.2 oz) 104.6 kg (230 lb 11.2 oz)     Vital Signs with Ranges  Temp:  [98.2  F (36.8  C)-99.3  F (37.4  C)] 98.2  F (36.8  C)  Heart Rate:  [73-78] 74  Resp:  [18] 18  BP: (136-183)/(61-87) 160/68  SpO2:  [95 %-99 %] 95 %  I/O last 3 completed shifts:  In: 840 [P.O.:840]  Out: 300 [Urine:300]    GEN:  Alert, oriented, appears comfortable, NAD.  Sitting up in the chair watching cartoons when I saw her.  HEENT:  Normocephalic/atraumatic, no scleral icterus, no nasal discharge, mouth moist.  CV:  Regular rate and rhythm, distant.  No loud murmur/rub.  LUNGS:  Clear to auscultation bilaterally without rales/rhonchi/wheezing/retractions.  Symmetric chest rise on inhalation noted.  ABD:  Active bowel sounds, soft, slight epigastric tenderness, non-distended.  No rebound/guarding/rigidity.  EXT:  No edema.  No cyanosis.  No acute joint synovitis noted.  SKIN:  Dry to touch, no exanthems noted in the visualized areas.    Medications        insulin glargine  30 Units  Subcutaneous At Bedtime     [START ON 9/1/2017] metoprolol  125 mg Oral Daily     metoprolol  25 mg Oral Once     insulin aspart  1-7 Units Subcutaneous TID AC     insulin aspart  1-5 Units Subcutaneous At Bedtime     sodium chloride (PF)  3 mL Intracatheter Q8H     enoxaparin  40 mg Subcutaneous Q24H     senna-docusate  1-2 tablet Oral BID     aspirin  81 mg Oral Daily     atorvastatin  80 mg Oral Daily     benztropine (COGENTIN) tablet 1 mg  1 mg Oral BID     citalopram (celeXA) half-tab 15 mg  15 mg Oral Daily     gabapentin  400 mg Oral TID     haloperidol (HALDOL) tablet 5 mg  5 mg Oral At Bedtime     melatonin  5 mg Oral At Bedtime     losartan  100 mg Oral Daily     ranitidine  300 mg Oral At Bedtime     Data       Recent Labs  Lab 08/31/17  0820 08/30/17  0738 08/29/17  1325   WBC 6.7 10.6 13.6*   HGB 8.3* 8.6* 10.5*   HCT 25.6* 26.8* 32.9*   MCV 96 96 97   * 153 214       Recent Labs  Lab 08/29/17  1735 08/29/17  1632 08/29/17  1529 08/29/17  1325   CULT No growth after 2 days No growth after 2 days No growth No growth after 2 days       Recent Labs  Lab 08/31/17  0820 08/30/17  0738 08/29/17  1325    137 133   POTASSIUM 4.0 4.0 4.1   CHLORIDE 108 106 103   CO2 28 25 19*   ANIONGAP 5 6 11   GLC 58* 128* 223*   BUN 11 17 23   CR 0.79 0.88 0.90   GFRESTIMATED 75 66 65   GFRESTBLACK >90 80 78   ALLEN 8.4* 8.3* 8.9   PROTTOTAL  --  6.6* 7.9   ALBUMIN  --  2.9* 3.6   BILITOTAL  --  0.7 0.6   ALKPHOS  --  93 119   AST  --  28 57*   ALT  --  34 45       Recent Labs  Lab 08/30/17  0738   .0*       Recent Labs  Lab 08/29/17  1529   COLOR Yellow   APPEARANCE Clear   URINEGLC >499*   URINEBILI Negative   URINEKETONE Negative   SG 1.011   UBLD Negative   URINEPH 5.0   PROTEIN Negative   NITRITE Negative   LEUKEST Negative   RBCU <1   WBCU 2       No results found for this or any previous visit (from the past 24 hour(s)).

## 2017-08-31 NOTE — TELEPHONE ENCOUNTER
Kalie (RN) [facility] is requesting an order for Lantus to be increased from 45 to 50. Fax number [phone]. Any questions Kalie can be reached at [phone]

## 2017-08-31 NOTE — PLAN OF CARE
Problem: Goal Outcome Summary  Goal: Goal Outcome Summary  Outcome: No Change  Patient is A/O through shift with some confusion to situations, afebrile, increased BPs noted, and decreased oxygen saturations with ambulation and talking, O2 via nasal cannula continues at 1LPM due to desat at rest on room air to mid/low 80s. Spell for v-fib noted on tele and MD notified and arrive and ordered 12 lead EKG, ECHO, and CT of chest along with troponin (results posted). IV access lost yesterday, and obtained this afternoon by vascular access (x2 PIV). Room service added and will start tomorrow. Blood glusose low and orange juice given x2 due to low meal intake, insulin orders changed, recommend to give insulin with meals and not prior.

## 2017-08-31 NOTE — TELEPHONE ENCOUNTER
Patient is currently inpatient at the hospital for sepsis. Will await discharge orders and review BS's and lantus dose on discharge.  Rina Norris RN

## 2017-08-31 NOTE — PLAN OF CARE
Problem: Goal Outcome Summary  Goal: Goal Outcome Summary  Outcome: No Change  Pt A&Ox3, disoriented to situation, alarms on for safety. Up in room with Ax1, Tier A activity level. VSS, Tmax 99.3. BPmax 180/80's, down to 160's without intervention, note left for rounding MD. Sats maintained at high-90's on 2LPM, weaned to 1LPM. C/o abd pain, PRN Tylenol given. DD3 diet,  + 103, no SSI given, Lantus.

## 2017-08-31 NOTE — PROGRESS NOTES
Respiratory Therapy    Patient offered hospital CPAP for her stay, but said she did not want to use one if she did not have to. Patient informed if she changes her mind she may use a hospital CPAP at any time.     Jennifer Gonzalez RRT  8/31/2017

## 2017-08-31 NOTE — CONSULTS
Care Transition Initial Assessment -   Reason For Consult: discharge planning  Met with: Patient    Active Problems:    Sepsis (H)         DATA  Lives With: facility resident  Living Arrangements: group home- [facility] [phone] or [phone]  Description of Support System: Supportive, Involved- PT lives in a home with 3 other resident with full time staff.  Who is your support system?: Children, Facility resident(s)/Staff  Support Assessment: Adequate family and caregiver support, Adequate social supports. PT has a  Lot of community support. She has an AMRS worker that sees her once per week. CADI worker through the [place] [facility]  through [facility] ( [place])  Identified issues/concerns regarding health management: PT admitted from home due to a fall and fevers. Per the  staff pt does well. The  staff did express concerns that pt may have issus with swallowing.  PT is being followed by speech therapy with a recommendation of Dysphagia diet 3 with thin liquids.  baylee RN for the  setting plans to speak with primary care about a Video Swallow         Other Resources: Group Home They use [facility] located in [place]  Quality Of Family Relationships: supportive  Transportation Available:  ( staff can )      ASSESSMENT  Cognitive Status:  awake, alert and oriented  Met with pt. She is alert, uses a cane for mobility at her  setting. She really enjoys where she is living. PT directed sws to call her staff at  for any questions about her care. PT has no concerns about returning home .       PLAN  Spoke with Donna from  setting. They are willing to provide transport home for pt once stable. They are able to accept pt back on the weekends and Holiday as well. Do not need to fax ID info to them. They would like a packet for orders and H&P at OR  Following  Contact number [phone]

## 2017-08-31 NOTE — PROGRESS NOTES
Nurse caring for patient called via ATOMOO at about 1249 to check patient as on tele appeared to have a brief runs of Toursades. RN from telemetry also went to room to assess patient. Dr. Naylor also came and reviewed in the tele hub.

## 2017-09-01 ENCOUNTER — APPOINTMENT (OUTPATIENT)
Dept: SPEECH THERAPY | Facility: CLINIC | Age: 57
DRG: 871 | End: 2017-09-01
Payer: MEDICARE

## 2017-09-01 LAB
ANION GAP SERPL CALCULATED.3IONS-SCNC: 7 MMOL/L (ref 3–14)
BUN SERPL-MCNC: 9 MG/DL (ref 7–30)
CALCIUM SERPL-MCNC: 9.1 MG/DL (ref 8.5–10.1)
CHLORIDE SERPL-SCNC: 105 MMOL/L (ref 94–109)
CO2 SERPL-SCNC: 28 MMOL/L (ref 20–32)
CREAT SERPL-MCNC: 0.82 MG/DL (ref 0.52–1.04)
CRP SERPL-MCNC: 56.3 MG/L (ref 0–8)
ERYTHROCYTE [DISTWIDTH] IN BLOOD BY AUTOMATED COUNT: 13 % (ref 10–15)
GFR SERPL CREATININE-BSD FRML MDRD: 72 ML/MIN/1.7M2
GLUCOSE BLDC GLUCOMTR-MCNC: 115 MG/DL (ref 70–99)
GLUCOSE BLDC GLUCOMTR-MCNC: 146 MG/DL (ref 70–99)
GLUCOSE BLDC GLUCOMTR-MCNC: 191 MG/DL (ref 70–99)
GLUCOSE BLDC GLUCOMTR-MCNC: 230 MG/DL (ref 70–99)
GLUCOSE BLDC GLUCOMTR-MCNC: 84 MG/DL (ref 70–99)
GLUCOSE BLDC GLUCOMTR-MCNC: 86 MG/DL (ref 70–99)
GLUCOSE SERPL-MCNC: 84 MG/DL (ref 70–99)
HCT VFR BLD AUTO: 27 % (ref 35–47)
HGB BLD-MCNC: 8.8 G/DL (ref 11.7–15.7)
INTERPRETATION ECG - MUSE: NORMAL
MCH RBC QN AUTO: 30.6 PG (ref 26.5–33)
MCHC RBC AUTO-ENTMCNC: 32.6 G/DL (ref 31.5–36.5)
MCV RBC AUTO: 94 FL (ref 78–100)
PLATELET # BLD AUTO: 158 10E9/L (ref 150–450)
POTASSIUM SERPL-SCNC: 4.1 MMOL/L (ref 3.4–5.3)
RBC # BLD AUTO: 2.88 10E12/L (ref 3.8–5.2)
SODIUM SERPL-SCNC: 140 MMOL/L (ref 133–144)
WBC # BLD AUTO: 6.3 10E9/L (ref 4–11)

## 2017-09-01 PROCEDURE — 86140 C-REACTIVE PROTEIN: CPT | Performed by: INTERNAL MEDICINE

## 2017-09-01 PROCEDURE — 25000132 ZZH RX MED GY IP 250 OP 250 PS 637: Mod: GY | Performed by: INTERNAL MEDICINE

## 2017-09-01 PROCEDURE — 25000128 H RX IP 250 OP 636: Performed by: INTERNAL MEDICINE

## 2017-09-01 PROCEDURE — 99207 ZZC CDG-MDM COMPONENT: MEETS LOW - DOWN CODED: CPT | Performed by: INTERNAL MEDICINE

## 2017-09-01 PROCEDURE — 25000131 ZZH RX MED GY IP 250 OP 636 PS 637: Mod: GY | Performed by: INTERNAL MEDICINE

## 2017-09-01 PROCEDURE — 36415 COLL VENOUS BLD VENIPUNCTURE: CPT | Performed by: INTERNAL MEDICINE

## 2017-09-01 PROCEDURE — A9270 NON-COVERED ITEM OR SERVICE: HCPCS | Mod: GY | Performed by: INTERNAL MEDICINE

## 2017-09-01 PROCEDURE — 85027 COMPLETE CBC AUTOMATED: CPT | Performed by: INTERNAL MEDICINE

## 2017-09-01 PROCEDURE — 00000146 ZZHCL STATISTIC GLUCOSE BY METER IP

## 2017-09-01 PROCEDURE — 40000225 ZZH STATISTIC SLP WARD VISIT: Performed by: SPEECH-LANGUAGE PATHOLOGIST

## 2017-09-01 PROCEDURE — 12000007 ZZH R&B INTERMEDIATE

## 2017-09-01 PROCEDURE — 80048 BASIC METABOLIC PNL TOTAL CA: CPT | Performed by: INTERNAL MEDICINE

## 2017-09-01 PROCEDURE — 92526 ORAL FUNCTION THERAPY: CPT | Mod: GN | Performed by: SPEECH-LANGUAGE PATHOLOGIST

## 2017-09-01 PROCEDURE — 99232 SBSQ HOSP IP/OBS MODERATE 35: CPT | Performed by: INTERNAL MEDICINE

## 2017-09-01 RX ORDER — CHLORTHALIDONE 25 MG/1
25 TABLET ORAL DAILY
Status: DISCONTINUED | OUTPATIENT
Start: 2017-09-02 | End: 2017-09-04 | Stop reason: HOSPADM

## 2017-09-01 RX ADMIN — IBUPROFEN 600 MG: 600 TABLET ORAL at 21:15

## 2017-09-01 RX ADMIN — ENOXAPARIN SODIUM 40 MG: 40 INJECTION SUBCUTANEOUS at 20:45

## 2017-09-01 RX ADMIN — TAZOBACTAM SODIUM AND PIPERACILLIN SODIUM 3.38 G: 375; 3 INJECTION, SOLUTION INTRAVENOUS at 04:46

## 2017-09-01 RX ADMIN — ACETAMINOPHEN 650 MG: 325 TABLET, FILM COATED ORAL at 09:12

## 2017-09-01 RX ADMIN — TAZOBACTAM SODIUM AND PIPERACILLIN SODIUM 3.38 G: 375; 3 INJECTION, SOLUTION INTRAVENOUS at 17:04

## 2017-09-01 RX ADMIN — METOPROLOL SUCCINATE 125 MG: 25 TABLET, EXTENDED RELEASE ORAL at 09:07

## 2017-09-01 RX ADMIN — RANITIDINE 300 MG: 150 TABLET ORAL at 20:44

## 2017-09-01 RX ADMIN — SENNOSIDES AND DOCUSATE SODIUM 1 TABLET: 8.6; 5 TABLET ORAL at 09:07

## 2017-09-01 RX ADMIN — CITALOPRAM HYDROBROMIDE 15 MG: 10 TABLET ORAL at 09:06

## 2017-09-01 RX ADMIN — GABAPENTIN 400 MG: 400 CAPSULE ORAL at 09:07

## 2017-09-01 RX ADMIN — INSULIN GLARGINE 30 UNITS: 100 INJECTION, SOLUTION SUBCUTANEOUS at 20:45

## 2017-09-01 RX ADMIN — TAZOBACTAM SODIUM AND PIPERACILLIN SODIUM 3.38 G: 375; 3 INJECTION, SOLUTION INTRAVENOUS at 10:59

## 2017-09-01 RX ADMIN — LOSARTAN POTASSIUM 100 MG: 100 TABLET ORAL at 09:07

## 2017-09-01 RX ADMIN — BENZTROPINE MESYLATE 1 MG: 1 TABLET ORAL at 09:07

## 2017-09-01 RX ADMIN — Medication 5 MG: at 20:45

## 2017-09-01 RX ADMIN — INSULIN ASPART 1 UNITS: 100 INJECTION, SOLUTION INTRAVENOUS; SUBCUTANEOUS at 18:18

## 2017-09-01 RX ADMIN — ONDANSETRON 4 MG: 2 INJECTION INTRAMUSCULAR; INTRAVENOUS at 09:36

## 2017-09-01 RX ADMIN — ATORVASTATIN CALCIUM 80 MG: 40 TABLET, FILM COATED ORAL at 09:07

## 2017-09-01 RX ADMIN — HALOPERIDOL 5 MG: 5 TABLET ORAL at 20:45

## 2017-09-01 RX ADMIN — INSULIN ASPART 2 UNITS: 100 INJECTION, SOLUTION INTRAVENOUS; SUBCUTANEOUS at 12:59

## 2017-09-01 RX ADMIN — BENZTROPINE MESYLATE 1 MG: 1 TABLET ORAL at 20:45

## 2017-09-01 RX ADMIN — ASPIRIN 81 MG: 81 TABLET, COATED ORAL at 09:07

## 2017-09-01 RX ADMIN — GABAPENTIN 400 MG: 400 CAPSULE ORAL at 16:16

## 2017-09-01 RX ADMIN — ACETAMINOPHEN 650 MG: 325 TABLET, FILM COATED ORAL at 16:16

## 2017-09-01 RX ADMIN — TAZOBACTAM SODIUM AND PIPERACILLIN SODIUM 3.38 G: 375; 3 INJECTION, SOLUTION INTRAVENOUS at 22:23

## 2017-09-01 RX ADMIN — GABAPENTIN 400 MG: 400 CAPSULE ORAL at 20:45

## 2017-09-01 ASSESSMENT — PAIN DESCRIPTION - DESCRIPTORS
DESCRIPTORS: HEADACHE
DESCRIPTORS: HEADACHE

## 2017-09-01 NOTE — PROGRESS NOTES
"[facility]    Hospitalist Progress Note  Name: Nena Tang    MRN: [id number]  Provider:  Manfred Frost MD    Assessment & Plan   Summary of Stay: Nena Tang is a 57 year old female who came to attention from her Group Home on 8/29/2017 following a fall and confusion. She was brought in by two caregivers who described her as having been in \"her usual state of health\" on the day prior to admission when she went to the [facility]. On the date of admission, she did not eat breakfast and reported feeling \"dizzy\" when they found the patient on the ground. She had no evident injury and was brought to the ED due to concern for her unusual behavior.     Her past medical history is notable for schizoaffective disorder, hypertension, hyperlipidemia, coronary artery disease, obstructive sleep apnea, on CPAP, type 2 diabetes, history of polysubstance abuse for which she is currently living in a group home. She was brought in by caregivers because of a fall and confusion.      In the ED, she was found to have a fever, but initially, it appeared to be without a source. History of diarrhea, vomiting and an episode of urinary incontinence was obtained, but no cough or suspected SOB. The initial CXR was negative.     On 8/31, Ms. Tang evidently had an episode of VT that lasted at least 10 seconds, likely about a couple of minutes, possibly in different salvos. Dr. Naylor, her Hospitalist, worked her up and discussed the case with Dr. Bonner, from cardiology. CT scan that was completed to evaluate for possible PE as a trigger for VT showed a right lower and middle lobe infiltrate.     1.  Infectious Encephalopathy. Fever, hypoxia and confusion associated with right middle and lower lobe pneumonia. Pt is currently on Zosyn (in order to avoid medications that might contribute to prolonged QTc).     2.  DM with hypoglycemia this AM:  -  Glucoses poorly controlled by distant history though recent A1C's this summer " in the 7's.  She has had variable values here I believe related to her nausea/inconsistent eating.  She was hypoglycemic this AM.  I will reduce lantus to 30 units at HS and hold the 20 units with meals.  If she starts spiking as she eats more I would restart meal insulin with carb counting here based off what she is actually eating.  Continue SSI otherwise.    3.  Episode of V tach. Some of the strips appear compatible with torsades. Dr. Naylor spoke with Cardiology, who agreed with a conservative appropach.   - MG and K levels remain normal.   - EKG and Echo are fully normal and without evidence of CAD.  - pt will remain on Tele and a 12 lead should be obtained if there is any recurrence.   - question whether these tele strips could be artifactual.    4.  HTN:  -  Inadequate control since admission.  Overall though clearly on the high side.  Toprol XL increased to 125 mg daily. Will add Chlorthalidone.    5.  CAD:  -  Continue metoprolol, ASA, statin, ARB.  No acute cardiac complaints.    6.  Hyperlipidemia:  -  Statin    7.  Schizoaffective disorder:  -  Continue daily SSRI and haldol at HS.     8.  CINDY on CPAP.    DVT Prophylaxis: Enoxaparin (Lovenox) SQ  Code Status: Full Code    Disposition Plan   Expected discharge in 1-2 days to prior living arrangement once no fevers and glucoses more stable.     Entered: Manfred Frost 09/01/2017, 8:44 AM     Interval History   Chart reviewed, pt interviewed.    Ms. Tang states she is not hungry. She is overall not feeling too badly, but does not like to breath deeply due to discomfort with cough.  I asked pt about her rhythmic bouncing of her legs, and she states that it is a side effect of the Haldol.     -Data reviewed today: I reviewed all new labs and imaging reports over the last 24 hours. I personally reviewed no images or EKG's today.    Physical Exam   Temp: 98.6  F (37  C) Temp src: Axillary BP: 145/65 Pulse: 70 Heart Rate: 64 Resp: 18 SpO2: 99 % O2 Device:  Nasal cannula Oxygen Delivery: 1 LPM  Vitals:    08/30/17 2340 08/31/17 0524 09/01/17 0450   Weight: 104.6 kg (230 lb 11.2 oz) 104.6 kg (230 lb 11.2 oz) 104.3 kg (229 lb 14.4 oz)     Vital Signs with Ranges  Temp:  [96.9  F (36.1  C)-99.1  F (37.3  C)] 98.6  F (37  C)  Pulse:  [70] 70  Heart Rate:  [64-79] 64  Resp:  [16-20] 18  BP: (145-167)/(61-68) 145/65  SpO2:  [75 %-100 %] 99 %  I/O last 3 completed shifts:  In: 674 [P.O.:480; I.V.:194]  Out: 1350 [Urine:1350]    GEN:  Alert, oriented, appears comfortable, NAD.  Pleasant. bouncing both legs with a rate of about 3-5/second.  HEENT:  Normocephalic/atraumatic, no scleral icterus, no nasal discharge, mouth moist.  CV:  Regular rate and rhythm, distant.  No loud murmur/rub.  LUNGS:  Clear to auscultation bilaterally without rales/rhonchi/wheezing/retractions.  Unable to elicit egophany. Symmetric chest rise on inhalation noted.  ABD:  Active bowel sounds, soft, slight epigastric tenderness, non-distended.  No rebound/guarding/rigidity.  EXT:  No edema.  No cyanosis.  No acute joint synovitis noted.  SKIN:  Dry to touch, no exanthems noted in the visualized areas.    Medications        insulin glargine  30 Units Subcutaneous At Bedtime     metoprolol  125 mg Oral Daily     piperacillin-tazobactam  3.375 g Intravenous Q6H     insulin aspart  1-7 Units Subcutaneous TID AC     insulin aspart  1-5 Units Subcutaneous At Bedtime     sodium chloride (PF)  3 mL Intracatheter Q8H     enoxaparin  40 mg Subcutaneous Q24H     senna-docusate  1-2 tablet Oral BID     aspirin  81 mg Oral Daily     atorvastatin  80 mg Oral Daily     benztropine (COGENTIN) tablet 1 mg  1 mg Oral BID     citalopram (celeXA) half-tab 15 mg  15 mg Oral Daily     gabapentin  400 mg Oral TID     haloperidol (HALDOL) tablet 5 mg  5 mg Oral At Bedtime     melatonin  5 mg Oral At Bedtime     losartan  100 mg Oral Daily     ranitidine  300 mg Oral At Bedtime     Data       Recent Labs  Lab 09/01/17  0700  08/31/17  0820 08/30/17  0738   WBC 6.3 6.7 10.6   HGB 8.8* 8.3* 8.6*   HCT 27.0* 25.6* 26.8*   MCV 94 96 96    140* 153       Recent Labs  Lab 08/29/17  1735 08/29/17  1632 08/29/17  1529 08/29/17  1325   CULT No growth after 3 days No growth after 3 days No growth No growth after 3 days       Recent Labs  Lab 09/01/17  0700 08/31/17  0820 08/30/17  0738 08/29/17  1325    141 137 133   POTASSIUM 4.1 4.0 4.0 4.1   CHLORIDE 105 108 106 103   CO2 28 28 25 19*   ANIONGAP 7 5 6 11   GLC 84 58* 128* 223*   BUN 9 11 17 23   CR 0.82 0.79 0.88 0.90   GFRESTIMATED 72 75 66 65   GFRESTBLACK 88 >90 80 78   ALLEN 9.1 8.4* 8.3* 8.9   MAG  --  2.1  --   --    PROTTOTAL  --   --  6.6* 7.9   ALBUMIN  --   --  2.9* 3.6   BILITOTAL  --   --  0.7 0.6   ALKPHOS  --   --  93 119   AST  --   --  28 57*   ALT  --   --  34 45       Recent Labs  Lab 09/01/17  0700 08/30/17  0738   CRP 56.3* 103.0*       Recent Labs  Lab 08/29/17  1529   COLOR Yellow   APPEARANCE Clear   URINEGLC >499*   URINEBILI Negative   URINEKETONE Negative   SG 1.011   UBLD Negative   URINEPH 5.0   PROTEIN Negative   NITRITE Negative   LEUKEST Negative   RBCU <1   WBCU 2       Recent Results (from the past 24 hour(s))   CT Chest Pulmonary Embolism w Contrast    Narrative    CT CHEST PULMONARY EMBOLISM WITH CONTRAST  8/31/2017 3:53 PM     HISTORY: Hypoxia.     COMPARISON: CT abdomen/pelvis 7/13/2017.    TECHNIQUE: Thin section axial images are performed from the thoracic  inlet to the lung bases utilizing 79 mL of Isovue 370 IV contrast  without adverse event. Coronal reformatted images are also generated.  Radiation dose for this scan was reduced using automated exposure  control, adjustment of the mA and/or kV according to patient size, or  iterative reconstruction technique.    FINDINGS:     Chest: Calcified granulomas noted in the left lower lobe on series 5,  image 49. Patchy infiltrate is noted within the lower lobes and right  middle lobe concerning  for pneumonia. Lungs are slightly hypoaerated.  Trace pleural effusions are present. No pericardial fluid. The heart  appears enlarged. Esophagus is unremarkable. Thyroid gland appears  normal in size where imaged. Small mediastinal and right hilar lymph  nodes are likely reactive in nature. No enlarged axillary lymph nodes.  No evidence of pulmonary embolism. Thoracic aorta is unremarkable.  Limited images upper abdomen are within normal limits. Bone window  examination demonstrates mild degenerative mid and lower thoracic  spine changes.      Impression    IMPRESSION:  1. Infiltrate and consolidation right middle lobe and lower lobes  concerning for pneumonia. Reactive mediastinal and right hilar lymph  nodes are present.  2. No evidence of pulmonary embolism. Thoracic aorta is unremarkable.    KEILA HEALY MD

## 2017-09-01 NOTE — PLAN OF CARE
Problem: Goal Outcome Summary  Goal: Goal Outcome Summary  Discharge Planner SLP   Patient plan for discharge: not stated  Current status: Pt reports no difficulty with DD3 and thin liquid diet but feels like she doesn't have much choice on the diet.  Pt sat on edge of bed and ate robel cracker independently with functional oral management and transit times.  No oral residue.  Laryngeal elevation and no overt s/s aspiration.  Instructed pt to sip water between bites of cracker which she did from her water pitcher via straw without overt s/s aspiration.  Pt should tolerate a regular diet at this time as she reported that she felt like her swallow was back to normal.  MD did not give SLP order to change diet so nurse stated she would call MD for regular diet order.  SLP to check on patient tomorrow to ensure tolerance of diet and then d/c if she is tolerating.  Barriers to return to prior living situation: none  Recommendations for discharge: home  Rationale for recommendations: home.  Pt on regular diet no further SLP intervention should be necessary.       Entered by: Pablito Carr 09/01/2017 4:06 PM

## 2017-09-01 NOTE — PLAN OF CARE
Problem: Goal Outcome Summary  Goal: Goal Outcome Summary  Outcome: No Change  VSS.  Weaned to 1L O2 overnight. Per RT, pt refusing CPAP.  Periods of apnea noted when pt sleeping along with snoring.  Blood sugar 84 overnight, 2 juice given and up to 115.  On Iv zosyn.  SW and speech following.  Up with A-1.  Will continue to monitor.

## 2017-09-01 NOTE — PLAN OF CARE
Problem: Goal Outcome Summary  Goal: Goal Outcome Summary  Outcome: No Change  Afebrile. Requiring 1-2L NC to keep sats >88%. Lethargic and sleeps most of the day. On telemetry monitoring. No chest pain.  BG 86, 191 with coverage given as indicated.  Slight dyspnea upon exertion. IS and ambulation encouraged. LS dim. Pt reports small amounts of yellow sputum. IV zosyn abx.  Abdominal discomfort relieved with tylenol and PRN zofran. BS hypo, +flatus, had BM today.  Voiding adequately.  Ambulated in halls x1.

## 2017-09-02 LAB
ANION GAP SERPL CALCULATED.3IONS-SCNC: 7 MMOL/L (ref 3–14)
BUN SERPL-MCNC: 6 MG/DL (ref 7–30)
CALCIUM SERPL-MCNC: 8.6 MG/DL (ref 8.5–10.1)
CHLORIDE SERPL-SCNC: 105 MMOL/L (ref 94–109)
CO2 SERPL-SCNC: 27 MMOL/L (ref 20–32)
CREAT SERPL-MCNC: 0.83 MG/DL (ref 0.52–1.04)
GFR SERPL CREATININE-BSD FRML MDRD: 71 ML/MIN/1.7M2
GLUCOSE BLDC GLUCOMTR-MCNC: 171 MG/DL (ref 70–99)
GLUCOSE BLDC GLUCOMTR-MCNC: 182 MG/DL (ref 70–99)
GLUCOSE BLDC GLUCOMTR-MCNC: 228 MG/DL (ref 70–99)
GLUCOSE BLDC GLUCOMTR-MCNC: 243 MG/DL (ref 70–99)
GLUCOSE BLDC GLUCOMTR-MCNC: 96 MG/DL (ref 70–99)
GLUCOSE SERPL-MCNC: 96 MG/DL (ref 70–99)
MAGNESIUM SERPL-MCNC: 2.1 MG/DL (ref 1.6–2.3)
PHOSPHATE SERPL-MCNC: 4.1 MG/DL (ref 2.5–4.5)
POTASSIUM SERPL-SCNC: 4.4 MMOL/L (ref 3.4–5.3)
SODIUM SERPL-SCNC: 139 MMOL/L (ref 133–144)

## 2017-09-02 PROCEDURE — 84100 ASSAY OF PHOSPHORUS: CPT | Performed by: INTERNAL MEDICINE

## 2017-09-02 PROCEDURE — A9270 NON-COVERED ITEM OR SERVICE: HCPCS | Mod: GY | Performed by: INTERNAL MEDICINE

## 2017-09-02 PROCEDURE — 80048 BASIC METABOLIC PNL TOTAL CA: CPT | Performed by: INTERNAL MEDICINE

## 2017-09-02 PROCEDURE — 25000132 ZZH RX MED GY IP 250 OP 250 PS 637: Mod: GY | Performed by: INTERNAL MEDICINE

## 2017-09-02 PROCEDURE — 25000125 ZZHC RX 250: Performed by: INTERNAL MEDICINE

## 2017-09-02 PROCEDURE — 25000128 H RX IP 250 OP 636: Performed by: INTERNAL MEDICINE

## 2017-09-02 PROCEDURE — 25000131 ZZH RX MED GY IP 250 OP 636 PS 637: Mod: GY | Performed by: INTERNAL MEDICINE

## 2017-09-02 PROCEDURE — 12000007 ZZH R&B INTERMEDIATE

## 2017-09-02 PROCEDURE — 99232 SBSQ HOSP IP/OBS MODERATE 35: CPT | Performed by: INTERNAL MEDICINE

## 2017-09-02 PROCEDURE — 00000146 ZZHCL STATISTIC GLUCOSE BY METER IP

## 2017-09-02 PROCEDURE — 36416 COLLJ CAPILLARY BLOOD SPEC: CPT | Performed by: INTERNAL MEDICINE

## 2017-09-02 PROCEDURE — 83735 ASSAY OF MAGNESIUM: CPT | Performed by: INTERNAL MEDICINE

## 2017-09-02 RX ORDER — FLUCONAZOLE 150 MG/1
150 TABLET ORAL ONCE
Status: COMPLETED | OUTPATIENT
Start: 2017-09-02 | End: 2017-09-02

## 2017-09-02 RX ADMIN — INSULIN ASPART 3 UNITS: 100 INJECTION, SOLUTION INTRAVENOUS; SUBCUTANEOUS at 16:39

## 2017-09-02 RX ADMIN — BENZTROPINE MESYLATE 1 MG: 1 TABLET ORAL at 20:18

## 2017-09-02 RX ADMIN — BENZTROPINE MESYLATE 1 MG: 1 TABLET ORAL at 07:43

## 2017-09-02 RX ADMIN — IBUPROFEN 600 MG: 600 TABLET ORAL at 19:20

## 2017-09-02 RX ADMIN — ATORVASTATIN CALCIUM 80 MG: 40 TABLET, FILM COATED ORAL at 07:47

## 2017-09-02 RX ADMIN — TAZOBACTAM SODIUM AND PIPERACILLIN SODIUM 3.38 G: 375; 3 INJECTION, SOLUTION INTRAVENOUS at 04:32

## 2017-09-02 RX ADMIN — INSULIN ASPART 1 UNITS: 100 INJECTION, SOLUTION INTRAVENOUS; SUBCUTANEOUS at 11:59

## 2017-09-02 RX ADMIN — GABAPENTIN 400 MG: 400 CAPSULE ORAL at 15:51

## 2017-09-02 RX ADMIN — Medication 5 MG: at 21:17

## 2017-09-02 RX ADMIN — TAZOBACTAM SODIUM AND PIPERACILLIN SODIUM 3.38 G: 375; 3 INJECTION, SOLUTION INTRAVENOUS at 11:18

## 2017-09-02 RX ADMIN — ONDANSETRON 4 MG: 4 TABLET, ORALLY DISINTEGRATING ORAL at 23:01

## 2017-09-02 RX ADMIN — ACETAMINOPHEN 650 MG: 325 TABLET, FILM COATED ORAL at 07:42

## 2017-09-02 RX ADMIN — TAZOBACTAM SODIUM AND PIPERACILLIN SODIUM 3.38 G: 375; 3 INJECTION, SOLUTION INTRAVENOUS at 22:58

## 2017-09-02 RX ADMIN — RANITIDINE 300 MG: 150 TABLET ORAL at 21:17

## 2017-09-02 RX ADMIN — GABAPENTIN 400 MG: 400 CAPSULE ORAL at 21:17

## 2017-09-02 RX ADMIN — INSULIN GLARGINE 30 UNITS: 100 INJECTION, SOLUTION SUBCUTANEOUS at 21:26

## 2017-09-02 RX ADMIN — ASPIRIN 81 MG: 81 TABLET, COATED ORAL at 07:43

## 2017-09-02 RX ADMIN — FLUCONAZOLE 150 MG: 150 TABLET ORAL at 14:41

## 2017-09-02 RX ADMIN — METOPROLOL SUCCINATE 125 MG: 25 TABLET, EXTENDED RELEASE ORAL at 07:43

## 2017-09-02 RX ADMIN — LOSARTAN POTASSIUM 100 MG: 100 TABLET ORAL at 07:42

## 2017-09-02 RX ADMIN — ONDANSETRON 4 MG: 4 TABLET, ORALLY DISINTEGRATING ORAL at 11:39

## 2017-09-02 RX ADMIN — GABAPENTIN 400 MG: 400 CAPSULE ORAL at 07:43

## 2017-09-02 RX ADMIN — CITALOPRAM HYDROBROMIDE 15 MG: 10 TABLET ORAL at 07:42

## 2017-09-02 RX ADMIN — TAZOBACTAM SODIUM AND PIPERACILLIN SODIUM 3.38 G: 375; 3 INJECTION, SOLUTION INTRAVENOUS at 17:21

## 2017-09-02 RX ADMIN — HALOPERIDOL 5 MG: 5 TABLET ORAL at 21:17

## 2017-09-02 RX ADMIN — ENOXAPARIN SODIUM 40 MG: 40 INJECTION SUBCUTANEOUS at 20:18

## 2017-09-02 RX ADMIN — CHLORTHALIDONE 25 MG: 25 TABLET ORAL at 07:43

## 2017-09-02 RX ADMIN — ACETAMINOPHEN 650 MG: 325 TABLET, FILM COATED ORAL at 00:46

## 2017-09-02 NOTE — PLAN OF CARE
Problem: Goal Outcome Summary  Goal: Goal Outcome Summary  Outcome: Improving  Reports some headache relief with tylenol po times one and ibuprofen po times one. No complaints of nausea. Fair appetite. Up with standby assist. Heart rate normal sinus on tele.

## 2017-09-02 NOTE — PROGRESS NOTES
"[facility]    Hospitalist Progress Note  Name: Nena Tang    MRN: [id number]  Provider:  Manfred Frost MD    Assessment & Plan   Summary of Stay: Nena Tang is a 57 year old female who came to attention from her Group Home on 8/29/2017 following a fall and confusion. She was brought in by two caregivers who described her as having been in \"her usual state of health\" on the day prior to admission when she went to the [facility]. On the date of admission, she did not eat breakfast and reported feeling \"dizzy\" when they found the patient on the ground. She had no evident injury and was brought to the ED due to concern for her unusual behavior.     Her past medical history is notable for schizoaffective disorder, hypertension, hyperlipidemia, coronary artery disease, obstructive sleep apnea, on CPAP, type 2 diabetes, history of polysubstance abuse for which she is currently living in a group home. She was brought in by caregivers because of a fall and confusion.      In the ED, she was found to have a fever, but initially, it appeared to be without a source. History of diarrhea, vomiting and an episode of urinary incontinence was obtained, but no cough or suspected SOB. The initial CXR was negative.     On 8/31, Ms. Tang evidently had an episode of VT that lasted at least 10 seconds, likely about a couple of minutes, possibly in different salvos. Dr. Naylor, her Hospitalist, worked her up and discussed the case with Dr. Bonner, from cardiology. CT scan that was completed to evaluate for possible PE as a trigger for VT showed a right lower and middle lobe infiltrate.     1.  Infectious Encephalopathy. Fever, hypoxia and confusion associated with right middle and lower lobe pneumonia v atelectasis. Pt is currently on Zosyn (in order to avoid medications that might contribute to prolonged QTc). Will recheck Procalcitonin in am. Also treat presumed yeast vaginitis with Diflucan 150 mg x 1.    2.  DM " "with hypoglycemia this AM:  -  Glucoses poorly controlled by distant history though recent A1C's this summer in the 7's.  She has had variable values here I believe related to her nausea/inconsistent eating.  She was hypoglycemic this AM.  I will reduce lantus to 30 units at HS and hold the 20 units with meals.  If she starts spiking as she eats more I would restart meal insulin with carb counting here based off what she is actually eating.  Continue SSI otherwise.    3.  Possible Episode of V tach. Some of the strips appear compatible with torsades. Dr. Naylor spoke with Cardiology, who agreed with a conservative appropach. I do note that there is some question as to whether this is a \"true\" finding or artifact, as pt is very low-risk for life-threatening arrhythmia.  - MG and K levels remain normal.   - EKG and Echo are fully normal and without evidence of CAD.  - pt will remain on Tele and a 12 lead should be obtained if there is any recurrence.   - question whether these tele strips could be artifactual.    4.  HTN:  -  Inadequate control since admission.  Overall though clearly on the high side.  Toprol XL increased to 125 mg daily. Will add Chlorthalidone.    5.  CAD (non-obstructive)  -  Continue metoprolol, ASA, statin, ARB.  No acute cardiac complaints.    6.  Hyperlipidemia:  -  Statin    7.  Schizoaffective disorder:  -  Continue daily SSRI and haldol at HS.     8.  CINDY on CPAP.    DVT Prophylaxis: Enoxaparin (Lovenox) SQ  Code Status: Full Code    Disposition Plan   Expected discharge in 1-2 days to prior living arrangement once no fevers and glucoses more stable.     Entered: Manfred Frost 09/02/2017, 4:52 PM     Interval History   Pt complaining of vaginal itching with antibiotic. Documented in pt's chart, she has tolerated Fluconazole in the past.  Cough improved, less SOB. Denies fevers overnight. No problem with swallowing and pt notes her appetite is improved. Reports being up and " ambulating.    -Data reviewed today: I reviewed all new labs and imaging reports over the last 24 hours. I personally reviewed no images or EKG's today.    Physical Exam   Temp: 97.4  F (36.3  C) Temp src: Oral BP: 155/67   Heart Rate: 67 Resp: 18 SpO2: 92 % O2 Device: None (Room air) Oxygen Delivery: 1.5 LPM  Vitals:    08/31/17 0524 09/01/17 0450 09/02/17 0455   Weight: 104.6 kg (230 lb 11.2 oz) 104.3 kg (229 lb 14.4 oz) 103.6 kg (228 lb 4.8 oz)     Vital Signs with Ranges  Temp:  [97  F (36.1  C)-97.7  F (36.5  C)] 97.4  F (36.3  C)  Heart Rate:  [61-70] 67  Resp:  [16-20] 18  BP: (118-174)/(46-76) 155/67  SpO2:  [90 %-99 %] 92 %  I/O last 3 completed shifts:  In: 300 [P.O.:300]  Out: 1800 [Urine:1800]    GEN:  Alert, oriented, appears comfortable, NAD.  Pleasant..  HEENT:  Normocephalic/atraumatic, no scleral icterus, no nasal discharge, mouth moist.  CV:  Regular rate and rhythm, distant.  No loud murmur/rub.  LUNGS:  Clear to auscultation bilaterally without rales/rhonchi/wheezing/retractions.  Unable to elicit egophany. Symmetric chest rise on inhalation noted.  ABD:  Active bowel sounds, soft, slight epigastric tenderness, non-distended.  No rebound/guarding/rigidity.  EXT:  No edema.  No cyanosis.  No acute joint synovitis noted.  SKIN:  Dry to touch, no exanthems noted in the visualized areas.    Medications        chlorthalidone  25 mg Oral Daily     insulin glargine  30 Units Subcutaneous At Bedtime     metoprolol  125 mg Oral Daily     piperacillin-tazobactam  3.375 g Intravenous Q6H     insulin aspart  1-7 Units Subcutaneous TID AC     insulin aspart  1-5 Units Subcutaneous At Bedtime     sodium chloride (PF)  3 mL Intracatheter Q8H     enoxaparin  40 mg Subcutaneous Q24H     aspirin  81 mg Oral Daily     atorvastatin  80 mg Oral Daily     benztropine (COGENTIN) tablet 1 mg  1 mg Oral BID     citalopram (celeXA) half-tab 15 mg  15 mg Oral Daily     gabapentin  400 mg Oral TID     haloperidol (HALDOL)  tablet 5 mg  5 mg Oral At Bedtime     melatonin  5 mg Oral At Bedtime     losartan  100 mg Oral Daily     ranitidine  300 mg Oral At Bedtime     Data       Recent Labs  Lab 09/01/17  0700 08/31/17  0820 08/30/17  0738   WBC 6.3 6.7 10.6   HGB 8.8* 8.3* 8.6*   HCT 27.0* 25.6* 26.8*   MCV 94 96 96    140* 153       Recent Labs  Lab 08/29/17  1735 08/29/17  1632 08/29/17  1529 08/29/17  1325   CULT No growth after 4 days No growth after 4 days No growth No growth after 4 days       Recent Labs  Lab 09/02/17  0705 09/01/17  0700 08/31/17  0820 08/30/17  0738 08/29/17  1325    140 141 137 133   POTASSIUM 4.4 4.1 4.0 4.0 4.1   CHLORIDE 105 105 108 106 103   CO2 27 28 28 25 19*   ANIONGAP 7 7 5 6 11   GLC 96 84 58* 128* 223*   BUN 6* 9 11 17 23   CR 0.83 0.82 0.79 0.88 0.90   GFRESTIMATED 71 72 75 66 65   GFRESTBLACK 86 88 >90 80 78   ALLEN 8.6 9.1 8.4* 8.3* 8.9   MAG 2.1  --  2.1  --   --    PHOS 4.1  --   --   --   --    PROTTOTAL  --   --   --  6.6* 7.9   ALBUMIN  --   --   --  2.9* 3.6   BILITOTAL  --   --   --  0.7 0.6   ALKPHOS  --   --   --  93 119   AST  --   --   --  28 57*   ALT  --   --   --  34 45       Recent Labs  Lab 09/01/17  0700 08/30/17  0738   CRP 56.3* 103.0*       Recent Labs  Lab 08/29/17  1529   COLOR Yellow   APPEARANCE Clear   URINEGLC >499*   URINEBILI Negative   URINEKETONE Negative   SG 1.011   UBLD Negative   URINEPH 5.0   PROTEIN Negative   NITRITE Negative   LEUKEST Negative   RBCU <1   WBCU 2       No results found for this or any previous visit (from the past 24 hour(s)).

## 2017-09-02 NOTE — PLAN OF CARE
Problem: Goal Outcome Summary  Goal: Goal Outcome Summary  Outcome: Improving  Vital signs stable. BP elevated in the AM, stabilized after morning medications. Weaned to RA and stable O2 sats >88%.  LS dim, IS encouraged. On IV zosyn.  Tolerating regular diet. Nausea after antibiotic- PO zofran x1.  One time PO diflucan for suspected vaginal yeast infection d/t vaginal icthing/burning.  Up with SBA, ambulated in halls.  Anticipated D/C back to group home tomorrow.

## 2017-09-02 NOTE — PLAN OF CARE
Problem: Goal Outcome Summary  Goal: Goal Outcome Summary  Outcome: No Change  Pt up SBA.  A&O x2 (self and partial place)  Afebrile.  vss  C/o throat pain, tylenol given  LS Dim.  1.5L O2  +BS.  +flatus.  Slight nausea, declines medication.  Reg diet  Voiding  B  TX:  Zosyn  Disp:  TBD

## 2017-09-02 NOTE — PLAN OF CARE
Problem: Goal Outcome Summary  Goal: Goal Outcome Summary     SLP - Attempt swallow f/u x2. Pt sleeping soundly; did not disturb. No reported difficulty with advancement to regular diet/thin liquids per chart review. Will f/u at meal tomorrow as able. Do not anticipate further SLP needs after discharge. No video swallow indicated at this time.

## 2017-09-03 LAB
ANION GAP SERPL CALCULATED.3IONS-SCNC: 7 MMOL/L (ref 3–14)
BUN SERPL-MCNC: 8 MG/DL (ref 7–30)
CALCIUM SERPL-MCNC: 9 MG/DL (ref 8.5–10.1)
CHLORIDE SERPL-SCNC: 103 MMOL/L (ref 94–109)
CO2 SERPL-SCNC: 29 MMOL/L (ref 20–32)
CREAT SERPL-MCNC: 0.89 MG/DL (ref 0.52–1.04)
ERYTHROCYTE [DISTWIDTH] IN BLOOD BY AUTOMATED COUNT: 13 % (ref 10–15)
GFR SERPL CREATININE-BSD FRML MDRD: 65 ML/MIN/1.7M2
GLUCOSE BLDC GLUCOMTR-MCNC: 143 MG/DL (ref 70–99)
GLUCOSE BLDC GLUCOMTR-MCNC: 196 MG/DL (ref 70–99)
GLUCOSE BLDC GLUCOMTR-MCNC: 219 MG/DL (ref 70–99)
GLUCOSE BLDC GLUCOMTR-MCNC: 266 MG/DL (ref 70–99)
GLUCOSE BLDC GLUCOMTR-MCNC: 309 MG/DL (ref 70–99)
GLUCOSE SERPL-MCNC: 154 MG/DL (ref 70–99)
HCT VFR BLD AUTO: 25.8 % (ref 35–47)
HGB BLD-MCNC: 8.5 G/DL (ref 11.7–15.7)
MCH RBC QN AUTO: 30.8 PG (ref 26.5–33)
MCHC RBC AUTO-ENTMCNC: 32.9 G/DL (ref 31.5–36.5)
MCV RBC AUTO: 94 FL (ref 78–100)
PLATELET # BLD AUTO: 188 10E9/L (ref 150–450)
POTASSIUM SERPL-SCNC: 3.9 MMOL/L (ref 3.4–5.3)
PROCALCITONIN SERPL-MCNC: 0.43 NG/ML
RBC # BLD AUTO: 2.76 10E12/L (ref 3.8–5.2)
SODIUM SERPL-SCNC: 139 MMOL/L (ref 133–144)
WBC # BLD AUTO: 6.4 10E9/L (ref 4–11)

## 2017-09-03 PROCEDURE — 25000132 ZZH RX MED GY IP 250 OP 250 PS 637: Mod: GY | Performed by: INTERNAL MEDICINE

## 2017-09-03 PROCEDURE — 25000128 H RX IP 250 OP 636: Performed by: INTERNAL MEDICINE

## 2017-09-03 PROCEDURE — 93005 ELECTROCARDIOGRAM TRACING: CPT

## 2017-09-03 PROCEDURE — 25000125 ZZHC RX 250: Performed by: INTERNAL MEDICINE

## 2017-09-03 PROCEDURE — 36415 COLL VENOUS BLD VENIPUNCTURE: CPT | Performed by: INTERNAL MEDICINE

## 2017-09-03 PROCEDURE — 25000131 ZZH RX MED GY IP 250 OP 636 PS 637: Mod: GY | Performed by: INTERNAL MEDICINE

## 2017-09-03 PROCEDURE — 40000275 ZZH STATISTIC RCP TIME EA 10 MIN

## 2017-09-03 PROCEDURE — 84145 PROCALCITONIN (PCT): CPT | Performed by: INTERNAL MEDICINE

## 2017-09-03 PROCEDURE — A9270 NON-COVERED ITEM OR SERVICE: HCPCS | Mod: GY | Performed by: INTERNAL MEDICINE

## 2017-09-03 PROCEDURE — 40000895 ZZH STATISTIC SLP IP EVAL DEFER: Performed by: SPEECH-LANGUAGE PATHOLOGIST

## 2017-09-03 PROCEDURE — 99232 SBSQ HOSP IP/OBS MODERATE 35: CPT | Performed by: INTERNAL MEDICINE

## 2017-09-03 PROCEDURE — 00000146 ZZHCL STATISTIC GLUCOSE BY METER IP

## 2017-09-03 PROCEDURE — 12000000 ZZH R&B MED SURG/OB

## 2017-09-03 PROCEDURE — 80048 BASIC METABOLIC PNL TOTAL CA: CPT | Performed by: INTERNAL MEDICINE

## 2017-09-03 PROCEDURE — 85027 COMPLETE CBC AUTOMATED: CPT | Performed by: INTERNAL MEDICINE

## 2017-09-03 RX ADMIN — ASPIRIN 81 MG: 81 TABLET, COATED ORAL at 09:02

## 2017-09-03 RX ADMIN — CITALOPRAM HYDROBROMIDE 15 MG: 10 TABLET ORAL at 09:00

## 2017-09-03 RX ADMIN — TAZOBACTAM SODIUM AND PIPERACILLIN SODIUM 3.38 G: 375; 3 INJECTION, SOLUTION INTRAVENOUS at 10:25

## 2017-09-03 RX ADMIN — INSULIN ASPART 2 UNITS: 100 INJECTION, SOLUTION INTRAVENOUS; SUBCUTANEOUS at 12:20

## 2017-09-03 RX ADMIN — ONDANSETRON 4 MG: 4 TABLET, ORALLY DISINTEGRATING ORAL at 10:26

## 2017-09-03 RX ADMIN — INSULIN GLARGINE 30 UNITS: 100 INJECTION, SOLUTION SUBCUTANEOUS at 21:52

## 2017-09-03 RX ADMIN — HALOPERIDOL 5 MG: 5 TABLET ORAL at 21:46

## 2017-09-03 RX ADMIN — METOPROLOL SUCCINATE 125 MG: 25 TABLET, EXTENDED RELEASE ORAL at 09:00

## 2017-09-03 RX ADMIN — TAZOBACTAM SODIUM AND PIPERACILLIN SODIUM 3.38 G: 375; 3 INJECTION, SOLUTION INTRAVENOUS at 21:45

## 2017-09-03 RX ADMIN — TAZOBACTAM SODIUM AND PIPERACILLIN SODIUM 3.38 G: 375; 3 INJECTION, SOLUTION INTRAVENOUS at 04:53

## 2017-09-03 RX ADMIN — Medication 5 MG: at 21:46

## 2017-09-03 RX ADMIN — BENZTROPINE MESYLATE 1 MG: 1 TABLET ORAL at 21:46

## 2017-09-03 RX ADMIN — RANITIDINE 300 MG: 150 TABLET ORAL at 21:46

## 2017-09-03 RX ADMIN — GABAPENTIN 400 MG: 400 CAPSULE ORAL at 09:00

## 2017-09-03 RX ADMIN — ACETAMINOPHEN 650 MG: 325 TABLET, FILM COATED ORAL at 14:01

## 2017-09-03 RX ADMIN — CHLORTHALIDONE 25 MG: 25 TABLET ORAL at 09:00

## 2017-09-03 RX ADMIN — INSULIN ASPART 1 UNITS: 100 INJECTION, SOLUTION INTRAVENOUS; SUBCUTANEOUS at 09:00

## 2017-09-03 RX ADMIN — INSULIN ASPART 3 UNITS: 100 INJECTION, SOLUTION INTRAVENOUS; SUBCUTANEOUS at 17:11

## 2017-09-03 RX ADMIN — ATORVASTATIN CALCIUM 80 MG: 40 TABLET, FILM COATED ORAL at 09:01

## 2017-09-03 RX ADMIN — BENZTROPINE MESYLATE 1 MG: 1 TABLET ORAL at 09:00

## 2017-09-03 RX ADMIN — ENOXAPARIN SODIUM 40 MG: 40 INJECTION SUBCUTANEOUS at 21:41

## 2017-09-03 RX ADMIN — GABAPENTIN 400 MG: 400 CAPSULE ORAL at 16:22

## 2017-09-03 RX ADMIN — TAZOBACTAM SODIUM AND PIPERACILLIN SODIUM 3.38 G: 375; 3 INJECTION, SOLUTION INTRAVENOUS at 16:22

## 2017-09-03 RX ADMIN — GABAPENTIN 400 MG: 400 CAPSULE ORAL at 21:46

## 2017-09-03 RX ADMIN — LOSARTAN POTASSIUM 100 MG: 100 TABLET ORAL at 09:00

## 2017-09-03 NOTE — PLAN OF CARE
Problem: Goal Outcome Summary  Goal: Goal Outcome Summary  Outcome: Improving  Reports good pain relief for headache with po ibuprofen. Reports mild nausea relief with po zofran. Good appetite. Ambulating halls with standby assist. Using her own cpap for nocs.

## 2017-09-03 NOTE — PLAN OF CARE
Problem: Goal Outcome Summary  Goal: Goal Outcome Summary  Outcome: Improving  Pt up SBA  A&O x3  Denies pain  Afebrile.  vss  LS Dim.  CPAP worn overnight.  O2 sats occasionally dipped below goal of 88  Voiding  Hypo BS.  Passing flatus.  Denies nausea.  Reg diet  B  TX:  Zosyn  Disp:  TBD

## 2017-09-03 NOTE — PLAN OF CARE
Problem: Goal Outcome Summary  Goal: Goal Outcome Summary  Outcome: Improving  Elevated BPs- no scheduled meds. O2 sats stable on RA.  BG checks 154, 145, 196 with coverage given.  LS dim, slight dyspnea upon exertion. IV zosyn abx. IS encouraged.  Voiding adequately. Denies any vaginal itching/burning today.  Up with SBA, steady.

## 2017-09-03 NOTE — PROGRESS NOTES
Pt stated she will have family bring in her home CPAP machine if she is staying another night.    Estella Gunn

## 2017-09-03 NOTE — PLAN OF CARE
Problem: Goal Outcome Summary  Goal: Goal Outcome Summary     SLP - Pt sleeping on meal f/u attempt. Discussed with RN and chart reviewed. Per RN, pt with no overt s/sx of aspiration during meals this admission and pt is tolerating regular diet. Mild/minimal dysphagia on bedside evaluation on 8/30 and f/u 9/1.  reports pt coughing with PO although this has not been observed. Note pt with ? Hx of gastroparesis as well as vomiting during the day prior to admission. ? Possible aspiration of emesis. Per chart, no recurrent pna noted.  requesting video swallow although this is not indicated at this time. Recommend pt and  staff f/u with pt's primary MD if persistent difficulty swallowing.    Discharge Planner SLP   Patient plan for discharge: Return to   Current status: Tolerating regular diet per nsg and chart review; no overt s/sx of aspiration. Video swallow not indicated at this time.  Barriers to return to prior living situation: Medical status  Recommendations for discharge: Return to ; consider home SLP at discharge for assessment of swallowing  Rationale for recommendations: Minimal dysphagia;  staff report coughing with PO @        Entered by: Perez Medina 09/03/2017 9:42 AM

## 2017-09-03 NOTE — PROVIDER NOTIFICATION
Tele tech called reporting that the rhythm was showing possible v-fib or torsades. Pt reporting dizziness/nausea at this time but denies any chest pain. MD notified.

## 2017-09-04 VITALS
HEIGHT: 61 IN | HEART RATE: 70 BPM | TEMPERATURE: 97.6 F | SYSTOLIC BLOOD PRESSURE: 138 MMHG | DIASTOLIC BLOOD PRESSURE: 65 MMHG | OXYGEN SATURATION: 97 % | RESPIRATION RATE: 18 BRPM | BODY MASS INDEX: 42.37 KG/M2 | WEIGHT: 224.4 LBS

## 2017-09-04 PROBLEM — J18.9 PNEUMONIA OF RIGHT LOWER LOBE DUE TO INFECTIOUS ORGANISM: Status: ACTIVE | Noted: 2017-09-04

## 2017-09-04 PROBLEM — G93.49 INFECTIOUS ENCEPHALOPATHY: Status: ACTIVE | Noted: 2017-09-04

## 2017-09-04 PROBLEM — R11.2 NON-INTRACTABLE VOMITING WITH NAUSEA: Status: ACTIVE | Noted: 2017-09-04

## 2017-09-04 PROBLEM — B99.9 INFECTIOUS ENCEPHALOPATHY: Status: ACTIVE | Noted: 2017-09-04

## 2017-09-04 PROBLEM — J96.01 ACUTE RESPIRATORY FAILURE WITH HYPOXIA (H): Status: ACTIVE | Noted: 2017-09-04

## 2017-09-04 LAB
BACTERIA SPEC CULT: NO GROWTH
CREAT SERPL-MCNC: 0.78 MG/DL (ref 0.52–1.04)
GFR SERPL CREATININE-BSD FRML MDRD: 76 ML/MIN/1.7M2
GLUCOSE BLDC GLUCOMTR-MCNC: 182 MG/DL (ref 70–99)
GLUCOSE BLDC GLUCOMTR-MCNC: 185 MG/DL (ref 70–99)
GLUCOSE BLDC GLUCOMTR-MCNC: 217 MG/DL (ref 70–99)
Lab: NORMAL
Lab: NORMAL
PLATELET # BLD AUTO: 233 10E9/L (ref 150–450)
SPECIMEN SOURCE: NORMAL

## 2017-09-04 PROCEDURE — 99238 HOSP IP/OBS DSCHRG MGMT 30/<: CPT | Performed by: INTERNAL MEDICINE

## 2017-09-04 PROCEDURE — 25000128 H RX IP 250 OP 636: Performed by: INTERNAL MEDICINE

## 2017-09-04 PROCEDURE — A9270 NON-COVERED ITEM OR SERVICE: HCPCS | Mod: GY | Performed by: INTERNAL MEDICINE

## 2017-09-04 PROCEDURE — 25000132 ZZH RX MED GY IP 250 OP 250 PS 637: Mod: GY | Performed by: INTERNAL MEDICINE

## 2017-09-04 PROCEDURE — 00000146 ZZHCL STATISTIC GLUCOSE BY METER IP

## 2017-09-04 PROCEDURE — 85049 AUTOMATED PLATELET COUNT: CPT | Performed by: INTERNAL MEDICINE

## 2017-09-04 PROCEDURE — 82565 ASSAY OF CREATININE: CPT | Performed by: INTERNAL MEDICINE

## 2017-09-04 PROCEDURE — 36416 COLLJ CAPILLARY BLOOD SPEC: CPT | Performed by: INTERNAL MEDICINE

## 2017-09-04 RX ORDER — HYDRALAZINE HYDROCHLORIDE 20 MG/ML
10 INJECTION INTRAMUSCULAR; INTRAVENOUS EVERY 4 HOURS PRN
Status: DISCONTINUED | OUTPATIENT
Start: 2017-09-04 | End: 2017-09-04 | Stop reason: HOSPADM

## 2017-09-04 RX ORDER — CHLORTHALIDONE 25 MG/1
25 TABLET ORAL DAILY
Qty: 30 TABLET | Refills: 0 | Status: SHIPPED | OUTPATIENT
Start: 2017-09-04 | End: 2017-09-27

## 2017-09-04 RX ADMIN — CITALOPRAM HYDROBROMIDE 15 MG: 10 TABLET ORAL at 09:01

## 2017-09-04 RX ADMIN — TAZOBACTAM SODIUM AND PIPERACILLIN SODIUM 3.38 G: 375; 3 INJECTION, SOLUTION INTRAVENOUS at 04:18

## 2017-09-04 RX ADMIN — ASPIRIN 81 MG: 81 TABLET, COATED ORAL at 08:59

## 2017-09-04 RX ADMIN — GABAPENTIN 400 MG: 400 CAPSULE ORAL at 08:59

## 2017-09-04 RX ADMIN — Medication 1 LOZENGE: at 09:06

## 2017-09-04 RX ADMIN — BENZTROPINE MESYLATE 1 MG: 1 TABLET ORAL at 09:00

## 2017-09-04 RX ADMIN — INSULIN ASPART 1 UNITS: 100 INJECTION, SOLUTION INTRAVENOUS; SUBCUTANEOUS at 11:37

## 2017-09-04 RX ADMIN — ATORVASTATIN CALCIUM 80 MG: 40 TABLET, FILM COATED ORAL at 08:59

## 2017-09-04 RX ADMIN — ACETAMINOPHEN 650 MG: 325 TABLET, FILM COATED ORAL at 09:00

## 2017-09-04 RX ADMIN — LOSARTAN POTASSIUM 100 MG: 100 TABLET ORAL at 09:02

## 2017-09-04 RX ADMIN — METOPROLOL SUCCINATE 125 MG: 25 TABLET, EXTENDED RELEASE ORAL at 09:00

## 2017-09-04 RX ADMIN — CHLORTHALIDONE 25 MG: 25 TABLET ORAL at 09:07

## 2017-09-04 NOTE — PROGRESS NOTES
"[facility]    Hospitalist Progress Note  Name: Nena Tang    MRN: [id number]  Provider:  Manfred Frost MD    Assessment & Plan   Summary of Stay: Nena Tang is a 57 year old female who came to attention from her Group Home on 8/29/2017 following a fall and confusion. She was brought in by two caregivers who described her as having been in \"her usual state of health\" on the day prior to admission when she went to the [facility]. On the date of admission, she did not eat breakfast and reported feeling \"dizzy\" when they found the patient on the ground. She had no evident injury and was brought to the ED due to concern for her unusual behavior.     Her past medical history is notable for schizoaffective disorder, hypertension, hyperlipidemia, coronary artery disease, obstructive sleep apnea, on CPAP, type 2 diabetes, history of polysubstance abuse for which she is currently living in a group home. She was brought in by caregivers because of a fall and confusion.      In the ED, she was found to have a fever, but initially, it appeared to be without a source. History of diarrhea, vomiting and an episode of urinary incontinence was obtained, but no cough or suspected SOB. The initial CXR was negative.     There has been concern that Ms. Tang is having runs of \"VT\". However, today, it was quite evident that these are NOT VT, but rather artifact. Discussed with Cardiology.    1.  Infectious Encephalopathy. Fever, hypoxia and confusion associated with right middle and lower lobe pneumonia v atelectasis. Pt is currently on Zosyn (in order to avoid medications that might contribute to prolonged QTc). Will recheck Procalcitonin in am. Also treat presumed yeast vaginitis with Diflucan 150 mg x 1.    2.  DM with hypoglycemia this AM:  -  Glucoses poorly controlled by distant history though recent A1C's this summer in the 7's.  She has had variable values here I believe related to her nausea/inconsistent " eating.  She was hypoglycemic this AM.  I will reduce lantus to 30 units at HS and hold the 20 units with meals.  If she starts spiking as she eats more I would restart meal insulin with carb counting here based off what she is actually eating.  Continue SSI otherwise.    3.  Possible Episode of V tach seem conclusively to be artifact.    4.  HTN:  -  Inadequate control since admission.  Overall though clearly on the high side.  Toprol XL increased to 125 mg daily. Will add Chlorthalidone.    5.  CAD (non-obstructive)  -  Continue metoprolol, ASA, statin, ARB.  No acute cardiac complaints.    6.  Hyperlipidemia:  -  Statin    7.  Schizoaffective disorder:  -  Continue daily SSRI and haldol at HS.     8.  CINDY on CPAP.    DVT Prophylaxis: Enoxaparin (Lovenox) SQ  Code Status: Full Code    Disposition Plan   Expected discharge tomorrow to  prior living arrangement as pt is no longer having fevers and glucoses more stable.     Entered: Manfred Frost 09/03/2017, 10:05 PM     Interval History   Feeling improved, but still uncomfortable with cough. Feels she is coughing more. Appetite is improved. No N/V/D.    -Data reviewed today: I reviewed all new labs and imaging reports over the last 24 hours. I personally reviewed no images or EKG's today.    Physical Exam   Temp: 96.8  F (36  C) Temp src: Oral BP: 171/72   Heart Rate: 60 Resp: 18 SpO2: 98 % O2 Device: None (Room air) Oxygen Delivery: 1.5 LPM  Vitals:    09/01/17 0450 09/02/17 0455 09/03/17 0449   Weight: 104.3 kg (229 lb 14.4 oz) 103.6 kg (228 lb 4.8 oz) 103.4 kg (228 lb)     Vital Signs with Ranges  Temp:  [96.8  F (36  C)-97.7  F (36.5  C)] 96.8  F (36  C)  Heart Rate:  [55-77] 60  Resp:  [18] 18  BP: (129-188)/(49-86) 171/72  SpO2:  [94 %-99 %] 98 %  I/O last 3 completed shifts:  In: 520 [P.O.:520]  Out: 1950 [Urine:1850; Emesis/NG output:100]    GEN:  Alert, oriented, appears comfortable, NAD.  Pleasant..  HEENT:  Normocephalic/atraumatic, no scleral icterus,  no nasal discharge, mouth moist.  CV:  Regular rate and rhythm, distant.  No loud murmur/rub.  LUNGS:  Clear to auscultation bilaterally without rales/rhonchi/wheezing/retractions.  Unable to elicit egophany. Symmetric chest rise on inhalation noted.  ABD:  Active bowel sounds, soft, slight epigastric tenderness, non-distended.  No rebound/guarding/rigidity.  EXT:  No edema.  No cyanosis.  No acute joint synovitis noted.  SKIN:  Dry to touch, no exanthems noted in the visualized areas.    Medications        piperacillin-tazobactam  3.375 g Intravenous Q6H     chlorthalidone  25 mg Oral Daily     insulin glargine  30 Units Subcutaneous At Bedtime     metoprolol  125 mg Oral Daily     insulin aspart  1-7 Units Subcutaneous TID AC     insulin aspart  1-5 Units Subcutaneous At Bedtime     sodium chloride (PF)  3 mL Intracatheter Q8H     enoxaparin  40 mg Subcutaneous Q24H     aspirin  81 mg Oral Daily     atorvastatin  80 mg Oral Daily     benztropine (COGENTIN) tablet 1 mg  1 mg Oral BID     citalopram (celeXA) half-tab 15 mg  15 mg Oral Daily     gabapentin  400 mg Oral TID     haloperidol (HALDOL) tablet 5 mg  5 mg Oral At Bedtime     melatonin  5 mg Oral At Bedtime     losartan  100 mg Oral Daily     ranitidine  300 mg Oral At Bedtime     Data       Recent Labs  Lab 09/03/17  0705 09/01/17  0700 08/31/17  0820   WBC 6.4 6.3 6.7   HGB 8.5* 8.8* 8.3*   HCT 25.8* 27.0* 25.6*   MCV 94 94 96    158 140*       Recent Labs  Lab 08/29/17  1735 08/29/17  1632 08/29/17  1529 08/29/17  1325   CULT No growth after 5 days No growth after 5 days No growth No growth after 5 days       Recent Labs  Lab 09/03/17  0705 09/02/17  0705 09/01/17  0700 08/31/17  0820 08/30/17  0738 08/29/17  1325    139 140 141 137 133   POTASSIUM 3.9 4.4 4.1 4.0 4.0 4.1   CHLORIDE 103 105 105 108 106 103   CO2 29 27 28 28 25 19*   ANIONGAP 7 7 7 5 6 11   * 96 84 58* 128* 223*   BUN 8 6* 9 11 17 23   CR 0.89 0.83 0.82 0.79 0.88 0.90    GFRESTIMATED 65 71 72 75 66 65   GFRESTBLACK 79 86 88 >90 80 78   ALLEN 9.0 8.6 9.1 8.4* 8.3* 8.9   MAG  --  2.1  --  2.1  --   --    PHOS  --  4.1  --   --   --   --    PROTTOTAL  --   --   --   --  6.6* 7.9   ALBUMIN  --   --   --   --  2.9* 3.6   BILITOTAL  --   --   --   --  0.7 0.6   ALKPHOS  --   --   --   --  93 119   AST  --   --   --   --  28 57*   ALT  --   --   --   --  34 45       Recent Labs  Lab 09/01/17  0700 08/30/17  0738   CRP 56.3* 103.0*       Recent Labs  Lab 08/29/17  1529   COLOR Yellow   APPEARANCE Clear   URINEGLC >499*   URINEBILI Negative   URINEKETONE Negative   SG 1.011   UBLD Negative   URINEPH 5.0   PROTEIN Negative   NITRITE Negative   LEUKEST Negative   RBCU <1   WBCU 2       No results found for this or any previous visit (from the past 24 hour(s)).

## 2017-09-04 NOTE — PROGRESS NOTES
Pt d/c'd back to group home with staff member. D/c instructions given & verbalized understanding.

## 2017-09-04 NOTE — DISCHARGE SUMMARY
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 (H)    Schizoaffective disorder, depressive type (H)    Sepsis (H)    Pneumonia of right lower lobe due to infectious organism (H)    Infectious encephalopathy    Non-intractable vomiting with nausea    Acute respiratory failure with hypoxia (H)    overweight - BMI >35    Restless legs syndrome (RLS)            Procedures:   CT Chest PE protocol  CXR  CT Head without contrast          Medications Prior to Admission:     Prescriptions Prior to Admission   Medication Sig Dispense Refill Last Dose     BENZTROPINE MESYLATE PO Take 1 mg by mouth 2 times daily   8/29/2017 at am     CITALOPRAM HYDROBROMIDE PO Take 15 mg by mouth daily   8/29/2017 at am     senna-docusate (SENOKOT-S;PERICOLACE) 8.6-50 MG per tablet Take 1 tablet by mouth 2 times daily as needed for constipation   8/28/2017 at Unknown time     HALOPERIDOL PO Take 5 mg by mouth At Bedtime   8/28/2017 at hs     insulin glargine (LANTUS) 100 UNIT/ML injection Inject 50 Units Subcutaneous At Bedtime   8/28/2017 at hs     ACETAMINOPHEN PO Take 1,000 mg by mouth every 6 hours as needed for pain or fever   Past Month at Unknown time     gabapentin (NEURONTIN) 300 MG capsule TAKE 2 CAPSULES (600MG) BY MOUTH THREE TIMES A  capsule 1 8/29/2017 at am     atorvastatin (LIPITOR) 80 MG tablet TAKE 1 TABLET (80MG) BY MOUTH  DAILY 28 tablet 11 8/29/2017 at am     metoprolol (TOPROL-XL) 100 MG 24 hr tablet TAKE 1 TABLET (100MG) BY MOUTH DAILY 28 tablet 11 8/29/2017 at am     polyethylene glycol (MIRALAX/GLYCOLAX) powder TAKE 17 GRAMS (1 CAPFUL) BY MOUTH DAILY 527 g 3 8/29/2017 at am     NOVOLOG FLEXPEN 100 UNIT/ML soln INJECT 20 UNITS SUBCUTANEOUSLY THREE TIMES A DAY (WITH MEALS), INJECT AN EXTRA 7 UNITS ONCE DAILY FOR BLOOD SUGAR OVER 500 15 mL 3 8/28/2017 at pm     ranitidine (ZANTAC) 300 MG tablet TAKE 1 TABLET (300MG) BY MOUTH AT BEDTIME 28 tablet 3 8/28/2017 at hs     vitamin D3 (CHOLECALCIFEROL) 55196 UNITS capsule TAKE 1 CAPSULE (50,000 UNITS) BY MOUTH ONCE A WEEK 4 capsule 3 8/29/2017 at am     losartan (COZAAR) 100 MG tablet TAKE 1 TABLET (100MG) BY MOUTH DAILY 90 tablet 1 8/29/2017 at am     aspirin (ASPIRIN LOW DOSE) 81 MG EC tablet Take 1 tablet (81 mg) by mouth daily 100 tablet 4 8/29/2017 at am     ibuprofen (ADVIL,MOTRIN) 600 MG tablet Take 1 tablet (600 mg) by mouth every 4 hours as needed for moderate pain 60 tablet 0 Past Month at Unknown time     melatonin 5 MG CAPS Take 1 capsule by mouth daily At dinnertime 90 capsule 1 8/28/2017 at hs     ONE TOUCH ULTRA test strip TEST TWICE DAILY AS DIRECTED 150 strip 8 Taking     TRULICITY 1.5 MG/0.5ML pen INJECT 1.5MG SUBCUTANEOUSLY EVERY SEVEN DAYS 2 mL 11 8/22/2017     ULTICARE MINI 31G X 6 MM insulin pen needle USE 4 DAILY OR AS DIRECTED 100 each 11 Taking     glucose 4 G CHEW Take 2 every 15 minutes for blood sugar <70mg/dL. Recheck blood sugar every 15 minutes until above 70mg/dL, then eat a substantial meal. 20 tablet 1 Taking     order for DME Hold Novolog if meals are refused. 1 each 0 Taking     blood glucose monitoring (ONE TOUCH DELICA) lancets Use to test blood sugar 2 times daily or as directed.  Ok to substitute alternative if insurance prefers. 1 Box prn Taking     order for DME Equipment being ordered: Rolling walker 1 Device 0 Taking             Discharge  Medications:     Current Discharge Medication List      START taking these medications    Details   chlorthalidone (HYGROTON) 25 MG tablet Take 1 tablet (25 mg) by mouth daily  Qty: 30 tablet, Refills: 0    Associated Diagnoses: HTN, goal below 140/90      Continue this medication that has changed.   NOVOLOG FLEXPEN 100 UNIT/ML soln INJECT 10 UNITS SUBCUTANEOUSLY THREE TIMES A DAY (WITH MEALS), INJECT AN EXTRA 7 UNITS ONCE DAILY FOR BLOOD SUGAR OVER 500  Qty: 15 mL, Refills: 3      CONTINUE these medications which have NOT CHANGED    Details   BENZTROPINE MESYLATE PO Take 1 mg by mouth 2 times daily      CITALOPRAM HYDROBROMIDE PO Take 15 mg by mouth daily      senna-docusate (SENOKOT-S;PERICOLACE) 8.6-50 MG per tablet Take 1 tablet by mouth 2 times daily as needed for constipation      HALOPERIDOL PO Take 5 mg by mouth At Bedtime      insulin glargine (LANTUS) 100 UNIT/ML injection Inject 50 Units Subcutaneous At Bedtime      ACETAMINOPHEN PO Take 1,000 mg by mouth every 6 hours as needed for pain or fever      gabapentin (NEURONTIN) 300 MG capsule TAKE 2 CAPSULES (600MG) BY MOUTH THREE TIMES A DAY  Qty: 168 capsule, Refills: 1    Comments: Maximum Refills Reached  Associated Diagnoses: Type 2 diabetes mellitus with diabetic neuropathy, with long-term current use of insulin (H)      atorvastatin (LIPITOR) 80 MG tablet TAKE 1 TABLET (80MG) BY MOUTH DAILY  Qty: 28 tablet, Refills: 11    Comments: Maximum Refills Reached  Associated Diagnoses: Hyperlipidemia LDL goal <100      metoprolol (TOPROL-XL) 100 MG 24 hr tablet TAKE 1 TABLET (100MG) BY MOUTH DAILY  Qty: 28 tablet, Refills: 11    Comments: Maximum Refills Reached  Associated Diagnoses: Coronary artery disease involving native heart with angina pectoris, unspecified vessel or lesion type (H)      polyethylene glycol (MIRALAX/GLYCOLAX) powder TAKE 17 GRAMS (1 CAPFUL) BY MOUTH DAILY  Qty: 527 g, Refills: 3    Comments: Maximum Refills Reached  Associated  Diagnoses: Constipation, unspecified constipation type         ranitidine (ZANTAC) 300 MG tablet TAKE 1 TABLET (300MG) BY MOUTH AT BEDTIME  Qty: 28 tablet, Refills: 3    Comments: Maximum Refills Reached  Associated Diagnoses: Gastroesophageal reflux disease without esophagitis      vitamin D3 (CHOLECALCIFEROL) 49530 UNITS capsule TAKE 1 CAPSULE (50,000 UNITS) BY MOUTH ONCE A WEEK  Qty: 4 capsule, Refills: 3    Comments: Refill Too Soon  Associated Diagnoses: Vitamin D deficiency      losartan (COZAAR) 100 MG tablet TAKE 1 TABLET (100MG) BY MOUTH DAILY  Qty: 90 tablet, Refills: 1    Associated Diagnoses: Coronary artery disease involving native heart with angina pectoris, unspecified vessel or lesion type (H)      aspirin (ASPIRIN LOW DOSE) 81 MG EC tablet Take 1 tablet (81 mg) by mouth daily  Qty: 100 tablet, Refills: 4    Associated Diagnoses: Coronary artery disease involving native heart with angina pectoris, unspecified vessel or lesion type (H)      ibuprofen (ADVIL,MOTRIN) 600 MG tablet Take 1 tablet (600 mg) by mouth every 4 hours as needed for moderate pain  Qty: 60 tablet, Refills: 0    Associated Diagnoses: Closed fracture of one rib of right side, initial encounter      melatonin 5 MG CAPS Take 1 capsule by mouth daily At dinnertime  Qty: 90 capsule, Refills: 1    Associated Diagnoses: Insomnia, unspecified type      ONE TOUCH ULTRA test strip TEST TWICE DAILY AS DIRECTED  Qty: 150 strip, Refills: 8    Comments: **Patient requests 90 days supply**  Associated Diagnoses: Type 2 diabetes mellitus with diabetic neuropathy, with long-term current use of insulin (H)      TRULICITY 1.5 MG/0.5ML pen INJECT 1.5MG SUBCUTANEOUSLY EVERY SEVEN DAYS  Qty: 2 mL, Refills: 11    Associated Diagnoses: 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, with long-term current use of insulin, unspecified laterality (H)      ULTICARE MINI 31G X 6 MM insulin pen needle USE 4 DAILY OR AS  "DIRECTED  Qty: 100 each, Refills: 11    Comments: Refill Too Soon  Associated Diagnoses: 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, with long-term current use of insulin, unspecified laterality (H)      glucose 4 G CHEW Take 2 every 15 minutes for blood sugar <70mg/dL. Recheck blood sugar every 15 minutes until above 70mg/dL, then eat a substantial meal.  Qty: 20 tablet, Refills: 1    Associated Diagnoses: 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, with long-term current use of insulin, unspecified laterality (H)      !! order for DME Hold Novolog if meals are refused.  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  Ok to substitute alternative if insurance prefers.  Qty: 1 Box, Refills: prn    Associated Diagnoses: Type 2 diabetes mellitus with diabetic neuropathy, with long-term current use of insulin (H)      !! order for DME Equipment being ordered: Rolling walker  Qty: 1 Device, Refills: 0    Associated Diagnoses: Falls frequently       !! - Potential duplicate medications found. Please discuss with provider.                Consultations:   No consultations were requested during this admission           Hospital Course:   Nena Tang is a 57 year old female who came to attention from her Group Home on 8/29/2017 following a fall and confusion. She was brought in by two caregivers who described her as having been in \"her usual state of health\" on the day prior to admission when she went to the [facility]. On the date of admission, she did not eat breakfast and reported feeling \"dizzy\" when they found the patient on the ground. She had no evident injury and was " "brought to the ED due to concern for her unusual behavior.      Her past medical history is notable for schizoaffective disorder, hypertension, hyperlipidemia, coronary artery disease, obstructive sleep apnea, on CPAP, type 2 diabetes, history of polysubstance abuse for which she is currently living in a group home. She was brought in by caregivers because of a fall and confusion.       In the ED, she was found to have a fever, but initially, it appeared to be without a source. History of diarrhea, vomiting and an episode of urinary incontinence was obtained, but no cough or suspected SOB. The initial CXR was negative.      There has been concern that Ms. Tang is having runs of \"VT\". However, today, it was quite evident that these are NOT VT, but rather artifact. Discussed with Cardiology. (Pt has a \"nervous\" habit of bouncing her legs.      DX:  1.  Infectious Encephalopathy. Pt presented with fever, hypoxia and confusion associated which was thought due to right middle and lower lobe pneumonia v atelectasis. Pt was treated for 7 days with Zosyn (in order to avoid medications that might contribute to prolonged QTc). Also treated presumed yeast vaginitis with Diflucan 150 mg x 1.     2.  DM with hypoglycemia episodes. Discussed need to reduce home dose of Insulin at least preliminarily with expectation to start at decreased Lantus and meal-time dosing upon discharge. The  should follow up with pt's usual doctors to increase the doses again as needed.     3.  Possible Episode of V tach seem conclusively to be artifact.     4.  HTN:  -  Inadequate control since admission. Chlorthalidone added to his usual regimen of antihypertensive medications.     5.  CAD (non-obstructive)  -  Continue metoprolol, ASA, statin, ARB.  No acute cardiac complaints.     6.  Hyperlipidemia:  -  Statin     7.  Schizoaffective disorder:  -  Continue daily SSRI and haldol at HS.   -  pt remained pleasant and appropriate.     8.  CINDY on " "CPAP.    /65  Pulse 70  Temp 97.6  F (36.4  C) (Oral)  Resp 18  Ht 1.549 m (5' 1\")  Wt 101.8 kg (224 lb 6.4 oz)  LMP 01/06/2015  SpO2 97%  BMI 42.4 kg/m2  On the date of discharge, Ms. Tang is alert and comfortable She denies SOB, cough, N/V/D.  Chest: CTA  COR: RRR without murmur  Abd: soft, NTND  Extrem: no edema          Discharge Instructions and Follow-Up:   Discharge diet: Regular   Discharge activity: Activity as tolerated   Discharge follow-up: Follow up with 4 weeks with PMD to recheck BP and then in 6 weeks to repeat CXR.           Discharge Disposition:   Discharged to home      Attestation:  I have reviewed today's vital signs, notes, medications, labs and imaging.  Total time: 25 minutes    Manfred Frost MD     "

## 2017-09-04 NOTE — PLAN OF CARE
Problem: Goal Outcome Summary  Goal: Goal Outcome Summary  Speech Language Therapy Discharge Summary     Reason for therapy discharge:    Discharged to group home     Progress towards therapy goal(s). See goals on Care Plan in Epic electronic health record for goal details.  Goals met  If pt has s/s swallowing difficulty she should see outpatient SLP for follow up.  She appears to be tolerating regular diet with thin liquids at this time;     Therapy recommendation(s):    No further therapy is recommended.  She has been tolerating regular diet at hospital, if she has swallow difficulty at group home she should get order from primary care to see outpatient SLP for further assistance.

## 2017-09-04 NOTE — PROVIDER NOTIFICATION
BP elevated.  186/56.  HR 61.  C/o slight dizziness, but that is ongoing for her, no change.  MD notified.

## 2017-09-04 NOTE — PROGRESS NOTES
Discharge Planner   Discharge Plans in progress: Spoke with Zheng from  setting. He is ok with new med being filled here at [facility]   Barriers to discharge plan: None  Follow up plan:  staff to  about 1300. Please send packet with pt home. Do not need to fax       Entered by: Corinne C. White 09/04/2017 10:18 AM

## 2017-09-04 NOTE — PLAN OF CARE
Problem: Goal Outcome Summary  Goal: Goal Outcome Summary  Outcome: Improving  Pt up SBA  Denies pain  Afebrile.  Hypertensive.  PRN med ordered however, not given as recheck was below parameters.  LS Dim.  Cpap worn art of the night  +BS.  Slight nausea.  Reg diet.  Passing flatus  Voiding  B  TX:  Zosyn  Disp:  Possible dc today

## 2017-09-04 NOTE — PLAN OF CARE
Problem: Goal Outcome Summary  Goal: Goal Outcome Summary  Outcome: Improving  Vitals stable. Denies chest pain or SOB. Ate well. Voiding well. Blood glucose elevated and covered per sliding scale. Ambulated to bathroom many times and twice in hallway. Calls appropriately for nurse most of the time.

## 2017-09-05 DIAGNOSIS — G47.33 OSA (OBSTRUCTIVE SLEEP APNEA): Primary | ICD-10-CM

## 2017-09-05 LAB — INTERPRETATION ECG - MUSE: NORMAL

## 2017-09-06 NOTE — TELEPHONE ENCOUNTER
Patient  Has hospital f/u appt with PCP tomorrow, medication doses will be reviewed at this time.  Rina Norris RN

## 2017-09-07 ENCOUNTER — TELEPHONE (OUTPATIENT)
Dept: FAMILY MEDICINE | Facility: CLINIC | Age: 57
End: 2017-09-07

## 2017-09-07 ENCOUNTER — OFFICE VISIT (OUTPATIENT)
Dept: FAMILY MEDICINE | Facility: CLINIC | Age: 57
End: 2017-09-07
Payer: MEDICARE

## 2017-09-07 ENCOUNTER — OFFICE VISIT (OUTPATIENT)
Dept: PHARMACY | Facility: CLINIC | Age: 57
End: 2017-09-07
Payer: COMMERCIAL

## 2017-09-07 VITALS
HEIGHT: 61 IN | TEMPERATURE: 98.5 F | HEART RATE: 81 BPM | WEIGHT: 223 LBS | RESPIRATION RATE: 12 BRPM | SYSTOLIC BLOOD PRESSURE: 116 MMHG | DIASTOLIC BLOOD PRESSURE: 74 MMHG | BODY MASS INDEX: 42.1 KG/M2

## 2017-09-07 DIAGNOSIS — E11.3299 TYPE 2 DIABETES MELLITUS WITH MILD NONPROLIFERATIVE RETINOPATHY WITHOUT MACULAR EDEMA, WITH LONG-TERM CURRENT USE OF INSULIN, UNSPECIFIED LATERALITY (H): ICD-10-CM

## 2017-09-07 DIAGNOSIS — R06.09 DYSPNEA ON EXERTION: ICD-10-CM

## 2017-09-07 DIAGNOSIS — I10 HTN, GOAL BELOW 140/90: ICD-10-CM

## 2017-09-07 DIAGNOSIS — R05.9 COUGH: Primary | ICD-10-CM

## 2017-09-07 DIAGNOSIS — J18.9 PNEUMONIA OF RIGHT LOWER LOBE DUE TO INFECTIOUS ORGANISM: Primary | ICD-10-CM

## 2017-09-07 DIAGNOSIS — Z79.4 TYPE 2 DIABETES MELLITUS WITH MILD NONPROLIFERATIVE RETINOPATHY WITHOUT MACULAR EDEMA, WITH LONG-TERM CURRENT USE OF INSULIN, UNSPECIFIED LATERALITY (H): ICD-10-CM

## 2017-09-07 PROCEDURE — 99607 MTMS BY PHARM ADDL 15 MIN: CPT | Performed by: PHARMACIST

## 2017-09-07 PROCEDURE — 99605 MTMS BY PHARM NP 15 MIN: CPT | Performed by: PHARMACIST

## 2017-09-07 PROCEDURE — 99214 OFFICE O/P EST MOD 30 MIN: CPT | Performed by: NURSE PRACTITIONER

## 2017-09-07 RX ORDER — ALBUTEROL SULFATE 90 UG/1
2 AEROSOL, METERED RESPIRATORY (INHALATION) EVERY 6 HOURS PRN
Qty: 3 INHALER | Refills: 1 | Status: SHIPPED | OUTPATIENT
Start: 2017-09-07 | End: 2018-06-29

## 2017-09-07 NOTE — MR AVS SNAPSHOT
After Visit Summary   9/7/2017    Nena Tang    MRN: [id number]           Patient Information     Date Of Birth          1960        Visit Information        Provider Department      9/7/2017 11:00 AM Rowena Weber APRN CNP [facility]        Today's Diagnoses     Cough    -  1    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, with long-term current use of insulin, unspecified laterality (H)        Dyspnea on exertion          Care Instructions    -Video swallow study ordered.   -Call clinic with any questions or concerns.             Follow-ups after your visit        Additional Services     Speech Therapy Referral       *This order will print in the [facility] Central Scheduling Office*    [facility] provides Speech Therapy evaluation and treatment and many specialty services across the [place] system.  If requesting a specialty program, please choose from the list below.    Call [phone] to schedule [facility] at all locations, with the exception of [facility], please call [phone].     Treatment: Evaluation & Treatment  Speech Treatment Diagnosis: coughing with eating.   Special Instructions: recent aspiration pneumonia  Special Programs: none    Please be aware that coverage of these services is subject to the terms and limitations of your health insurance plan.  Call member services at your health plan with any benefit or coverage questions.      **Note to Provider** To refer patients to therapy outside of the location list, change the order class to External Referral in the order composer.                  Follow-up notes from your care team     Return in about 4 weeks (around 10/5/2017).      Your next 10 appointments already scheduled     Sep 19, 2017  9:00 AM CDT   Return Visit with ART Wilburn CNP   [place]  [facility] ([facility])    [address]  [address]  [facility] [address]   [phone]            Sep 19, 2017  9:00 AM CDT   Return Visit with Richardson Lopez [facility] Primary [facility] ([facility])    [address]  [address]  [facility] [address]   [phone]            Nov 15, 2017 10:15 AM CST   RETURN RETINA with Tiburcio Chacon MD   [facility] ([facility])    [address]  [address]  [address]  [facility] [address]   [phone]              Future tests that were ordered for you today     Open Future Orders        Priority Expected Expires Ordered    XR Video Swallow w Esophagram - Order with Speech Therapy Referral Routine 9/7/2017 9/7/2018 9/7/2017            Who to contact     If you have questions or need follow up information about today's clinic visit or your schedule please contact [facility] directly at [phone].  Normal or non-critical lab and imaging results will be communicated to you by [employer]hart, letter or phone within 4 business days after the clinic has received the results. If you do not hear from us within 7 days, please contact the clinic through [employer] or phone. If you have a critical or abnormal lab result, we will notify you by phone as soon as possible.  Submit refill requests through [employer] or call your pharmacy and they will forward the refill request to us. Please allow 3 business days for your refill to be completed.          Additional Information About Your Visit        [employer] Information     DashThis gives you secure access to your electronic health record. If you see a primary care provider, you can also send messages to your care team and make appointments. If you have questions, please call your primary care clinic.  If you do not have a primary care provider, please  "call [phone] and they will assist you.        Care EveryWhere ID     This is your Care EveryWhere ID. This could be used by other organizations to access your [place] medical records  [id number]        Your Vitals Were     Pulse Temperature Respirations Height Last Period BMI (Body Mass Index)    81 98.5  F (36.9  C) (Oral) 12 5' 1\" (1.549 m) 01/06/2015 42.14 kg/m2       Blood Pressure from Last 3 Encounters:   09/07/17 116/74   09/04/17 138/65   08/22/17 110/58    Weight from Last 3 Encounters:   09/07/17 223 lb (101.2 kg)   09/04/17 224 lb 6.4 oz (101.8 kg)   08/22/17 229 lb 8 oz (104.1 kg)              We Performed the Following     Speech Therapy Referral          Today's Medication Changes          These changes are accurate as of: 9/7/17 11:49 AM.  If you have any questions, ask your nurse or doctor.               Start taking these medicines.        Dose/Directions    albuterol 108 (90 BASE) MCG/ACT Inhaler   Commonly known as:  PROAIR HFA/PROVENTIL HFA/VENTOLIN HFA   Used for:  Dyspnea on exertion   Started by:  Rowena Weber APRN CNP        Dose:  2 puff   Inhale 2 puffs into the lungs every 6 hours as needed for shortness of breath / dyspnea or wheezing   Quantity:  3 Inhaler   Refills:  1         These medicines have changed or have updated prescriptions.        Dose/Directions    insulin glargine 100 UNIT/ML injection   Commonly known as:  LANTUS   This may have changed:  how much to take   Used for:  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, with long-term current use of insulin, unspecified laterality (H)   Changed by:  Rowena Weber APRN CNP        Dose:  35 Units   Inject 35 Units Subcutaneous At Bedtime   Quantity:  3 mL   Refills:  3            Where to get your medicines      These medications were sent to [address] [address]     " Phone:  [phone]     albuterol 108 (90 BASE) MCG/ACT Inhaler    insulin glargine 100 UNIT/ML injection                Primary Care Provider    None Specified       No primary provider on file.        Goals        General    Medical (pt-stated)     Notes - Note created  7/7/2016  9:15 AM by Chelsea Pulido RN    Get blood sugars under control    Improve medication compliance    As of today's date 7/7/2016 goal is met at 0 - 25%.   Goal Status:  Active          Equal Access to Services     KIMBERLY [facility] Center[facility]YANNE : Hadii aad ku hadasho Soomaali, waaxda luqadaha, qaybta kaalmada adeegyada, waxay idiin hayaan adeeg kharash la'aan . So [facility] [phone].    ATENCIÓN: Si habla español, tiene a trinh disposición servicios gratuitos de asistencia lingüística. [facility] [phone].    We comply with applicable federal civil rights laws and [place] laws. We do not discriminate on the basis of race, color, national origin, age, disability sex, sexual orientation or gender identity.            Thank you!     Thank you for choosing [facility]  for your care. Our goal is always to provide you with excellent care. Hearing back from our patients is one way we can continue to improve our services. Please take a few minutes to complete the written survey that you may receive in the mail after your visit with us. Thank you!             Your Updated Medication List - Protect others around you: Learn how to safely use, store and throw away your medicines at www.disposemymeds.org.          This list is accurate as of: 9/7/17 11:49 AM.  Always use your most recent med list.                   Brand Name Dispense Instructions for use Diagnosis    ACETAMINOPHEN PO      Take 1,000 mg by mouth every 6 hours as needed for pain or fever        albuterol 108 (90 BASE) MCG/ACT Inhaler    PROAIR HFA/PROVENTIL HFA/VENTOLIN HFA    3 Inhaler    Inhale 2 puffs into the lungs every 6 hours as needed for shortness of breath  / dyspnea or wheezing    Dyspnea on exertion       aspirin 81 MG EC tablet    ASPIRIN LOW DOSE    100 tablet    Take 1 tablet (81 mg) by mouth daily    Coronary artery disease involving native heart with angina pectoris, unspecified vessel or lesion type (H)       atorvastatin 80 MG tablet    LIPITOR    28 tablet    TAKE 1 TABLET (80MG) BY MOUTH DAILY    Hyperlipidemia LDL goal <100       BENZTROPINE MESYLATE PO      Take 1 mg by mouth 2 times daily        blood glucose monitoring lancets     1 Box    Use to test blood sugar 2 times daily or as directed.  Ok to substitute alternative if insurance prefers.    Type 2 diabetes mellitus with diabetic neuropathy, with long-term current use of insulin (H)       chlorthalidone 25 MG tablet    HYGROTON    30 tablet    Take 1 tablet (25 mg) by mouth daily    HTN, goal below 140/90       CITALOPRAM HYDROBROMIDE PO      Take 15 mg by mouth daily        gabapentin 300 MG capsule    NEURONTIN    168 capsule    TAKE 2 CAPSULES (600MG) BY MOUTH THREE TIMES A DAY    Type 2 diabetes mellitus with diabetic neuropathy, with long-term current use of insulin (H)       glucose 4 G Chew chewable tablet     20 tablet    Take 2 every 15 minutes for blood sugar <70mg/dL. Recheck blood sugar every 15 minutes until above 70mg/dL, then eat a substantial meal.    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, with long-term current use of insulin, unspecified laterality (H)       HALOPERIDOL PO      Take 5 mg by mouth At Bedtime        ibuprofen 600 MG tablet    ADVIL/MOTRIN    60 tablet    Take 1 tablet (600 mg) by mouth every 4 hours as needed for moderate pain    Closed fracture of one rib of right side, initial encounter       insulin aspart 100 UNIT/ML injection    NovoLOG FLEXPEN    15 mL    10 units AC    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, with long-term current use of insulin, unspecified  laterality (H)       insulin glargine 100 UNIT/ML injection    LANTUS    3 mL    Inject 35 Units Subcutaneous At Bedtime    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, with long-term current use of insulin, unspecified laterality (H)       losartan 100 MG tablet    COZAAR    90 tablet    TAKE 1 TABLET (100MG) BY MOUTH DAILY    Coronary artery disease involving native heart with angina pectoris, unspecified vessel or lesion type (H)       melatonin 5 MG Caps     90 capsule    Take 1 capsule by mouth daily At dinnertime    Insomnia, unspecified type       metoprolol 100 MG 24 hr tablet    TOPROL-XL    28 tablet    TAKE 1 TABLET (100MG) BY MOUTH DAILY    Coronary artery disease involving native heart with angina pectoris, unspecified vessel or lesion type (H)       ONE TOUCH ULTRA test strip   Generic drug:  blood glucose monitoring     150 strip    TEST TWICE DAILY AS DIRECTED    Type 2 diabetes mellitus with diabetic neuropathy, with long-term current use of insulin (H)       * order for DME     1 Device    Equipment being ordered: Rolling walker    Falls frequently       * order for DME     1 each    Hold Novolog if meals are refused.    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, with long-term current use of insulin, unspecified laterality (H)       polyethylene glycol powder    MIRALAX/GLYCOLAX    527 g    TAKE 17 GRAMS (1 CAPFUL) BY MOUTH DAILY    Constipation, unspecified constipation type       ranitidine 300 MG tablet    ZANTAC    28 tablet    TAKE 1 TABLET (300MG) BY MOUTH AT BEDTIME    Gastroesophageal reflux disease without esophagitis       senna-docusate 8.6-50 MG per tablet    SENOKOT-S;PERICOLACE     Take 1 tablet by mouth 2 times daily as needed for constipation        TRULICITY 1.5 MG/0.5ML pen   Generic drug:  dulaglutide     2 mL    INJECT 1.5MG SUBCUTANEOUSLY EVERY SEVEN DAYS    Type 2 diabetes mellitus with mild  nonproliferative retinopathy without macular edema, with long-term current use of insulin, unspecified laterality (H)       ULTICARE MINI 31G X 6 MM   Generic drug:  insulin pen needle     100 each    USE 4 DAILY OR AS DIRECTED    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, with long-term current use of insulin, unspecified laterality (H)       vitamin D3 63501 UNITS capsule    CHOLECALCIFEROL    4 capsule    TAKE 1 CAPSULE (50,000 UNITS) BY MOUTH ONCE A WEEK    Vitamin D deficiency       * Notice:  This list has 2 medication(s) that are the same as other medications prescribed for you. Read the directions carefully, and ask your doctor or other care provider to review them with you.

## 2017-09-07 NOTE — PROGRESS NOTES
"SUBJECTIVE/OBJECTIVE:                                                    Nena Tang is a 57 year old female coming in for a follow-up visit for Medication Therapy Management.  She was referred to me from Jud King, PCP is now Rowena Weber. Pt accompanied by \"foster mother\" - pt now in adult foster care, living with 4 other women. Pt seen as a covisit with PCP today.     Chief Complaint: Follow up from our visit on 3/3/17.  DM/HTN f/up.     Medication Adherence: Much improved now in adult foster care - daily oversight of her medications and they seem very involved.    Pneumonia: Pt was in the hospital for a week for pneumonia. She is still having significant issues with breathing with any sort of exertion. Spirometry last done 4 years ago.     Diabetes:  Pt currently taking Lantus 30units daily at bedtime, Novolog 10units TID prior to meals +7 units daily for sugars >500 mg/dL, Trulicity 1.5mg weekly. Doses of insulin were decreased by 50% when in the hospital.  Pt is not experiencing side effects.   SMB-2 times daily - fasting and later in the day.   Ranges (based on BG log): 143-254 mg/dL fasting since discharge; 264-404mg/dL later in the day (not 2 hours PP)  Symptoms of low blood sugar? none. Frequency of hypoglycemia? never.  Recent symptoms of high blood sugar? vision changes, polydipsia, fatigue, tingling and numbness  Microalbumin is < 30 mg/g. Pt is taking an ACEi/ARB.  Aspirin: Taking 81mg daily and denies side effects  Diet: Pt trying to eat less snacks, especially candy. Also trying to eat more healthy overall.     Hypertension: Current medications include chlorthalidone 25mg daily, metoprolol XL 100mg daily and losartan 100mg daily.  Patient does self-monitor BP. Home BP monitoring in normal range per \"foster mother.\"  Patient reports no current significant medication side effects though urination has increased.    Current labs include:  BP Readings from Last 3 Encounters: "   09/07/17 116/74   09/04/17 138/65   08/22/17 110/58     Lab Results   Component Value Date    A1C 7.6 08/30/2017    A1C 7.0 06/27/2017    A1C 7.2 03/28/2017    A1C 7.1 01/27/2017    A1C 9.7 11/11/2016     Recent Labs   Lab Test  07/13/16   1350  07/14/15   1215   09/03/13   1156   CHOL   --   147   --   161   HDL   --   39*   --   37*   LDL  137*  78   < >  90   TRIG   --   151*   --   171*   CHOLHDLRATIO   --   3.8   --   4.4    < > = values in this interval not displayed.       ALT       23   5/3/2016    MICROL       23   7/13/2016  MICROALBUMIN    11.39   7/13/2016    Last Basic Metabolic Panel:  NA      134   6/15/2016   POTASSIUM      4.5   6/15/2016  CHLORIDE      104   6/15/2016  BUN       19   6/15/2016  BUN     13.5   5/19/2009  CR     0.70   6/15/2016    GFR Estimate If Black   Date Value Ref Range Status   09/04/2017 >90 >60 mL/min/1.7m2 Final     Comment:      GFR Calc   09/03/2017 79 >60 mL/min/1.7m2 Final     Comment:      GFR Calc   09/02/2017 86 >60 mL/min/1.7m2 Final     Comment:      GFR Calc     TSH   Date Value Ref Range Status   12/29/2016 2.24 0.40 - 4.00 mU/L Final   ]  LMP 01/06/2015    Most Recent Immunizations   Administered Date(s) Administered     Influenza (IIV3) 09/24/2012     Influenza Vaccine IM 3yrs+ 4 Valent IIV4 09/16/2016     Mantoux 07/10/2014     Pneumococcal 23 valent 01/22/2009     TDAP Vaccine (Adacel) 01/22/2009     ASSESSMENT:                                                    Current medications were reviewed with her today only as above.     Medication Adherence:  Much improved, updated med list given to foster home staff.    Pneumonia: Still likely having issues stemming from pneumonia. Discussed this with PCP, see note for further details. May be appropriate to have spirometry done after evidence that pneumonia is resolved.    Diabetes:  Worsened now that the pt is out of the hospital. Will likely need to increase  insulin back to pre-hospital values. Should increase Lantus at this time and continue to closely monitor BGs with the purpose of titrating back up quickly.     Hypertension: Stable.    PLAN:                                                      1. Increase Lantus to 35 units daily.     I spent 30 minutes with this patient today.  All changes were made via collaborative practice agreement with Rowena Weber. A copy of the visit note was provided to the patient's primary care provider.     Will follow up on Monday via phone.    The patient was given a summary of these recommendations as an after visit summary.    Kenzie Sheehan, PharmD  Medication Therapy Management Pharmacist  Pager: [phone]

## 2017-09-07 NOTE — NURSING NOTE
"Chief Complaint   Patient presents with     Hospital F/U       Initial /74 (BP Location: Right arm)  Pulse 81  Temp 98.5  F (36.9  C) (Oral)  Resp 12  Ht 5' 1\" (1.549 m)  Wt 223 lb (101.2 kg)  LMP 01/06/2015  BMI 42.14 kg/m2 Estimated body mass index is 42.14 kg/(m^2) as calculated from the following:    Height as of this encounter: 5' 1\" (1.549 m).    Weight as of this encounter: 223 lb (101.2 kg).  Medication Reconciliation: complete     Karla Vargas CMA      "

## 2017-09-07 NOTE — TELEPHONE ENCOUNTER
Will forward to PCP to review and order Chest X-Ray if appropriate for 5 weeks out.    Indra Le RN

## 2017-09-07 NOTE — TELEPHONE ENCOUNTER
Dinorah guerrier called they received a call from pt's group home stating that Toprol  mg daily, was [place] to 125 mg daily when pt was seen at [facility].  Phar want to clarify which dose should pt be taking 100 or 125 mg.   Phar contact info:  [phone]        Giorgi Case

## 2017-09-07 NOTE — PROGRESS NOTES
SUBJECTIVE:   Nena Tang is a 57 year old female who presents to clinic today for the following health issues:  MTM: Kenzie Sheehan    Patient is accompanied by her caregiver.     Hospital Follow-up Visit:  Patient states that she was in the hospital with pneumonia for about a week and is here for a follow-up visit.   Patient reports some coughing with small white phlegm, denies any chest pain. Patient states that she would like something to help stop the coughing. We discussed that she needs to cough up the phlegm to keep her lungs clean and open. Patient denies cough that is keeping her up at night or stopping her from eating and keeping food down. Patient states that she did not bring home her incentive spirometer at discharge. Encouraged patient to continue with current plan at this time.     Patient's caregiver states that every time the patient has to swallow, she coughs. Patient's caregiver reports that this might have been the cause of her recent aspiration pneumonia. Patient reports that she has coughing with both liquid and solid foods.     Patient states that she has not been using her CPAP as prescribed. Patient's care giver would like a new CPAP for patient to see if a new mask would enable the patient to wear her CPAP daily at night.     Hospital/Nursing Home/IP Rehab Facility: [facility]  Date of Admission: 08/29/2017  Date of Discharge: 09/04/2017  Reason(s) for Admission: Hyperglycemia, Acute Respiratory failure with hypoxia, severe sepsis, nausea and vomiting non-intractable, Pneumonia of right lower lobe due to infectious organism, type II DM with nonproliferative retinopathy, restless leg syndrome            Problems taking medications regularly:  None       Medication changes since discharge: chlorthalidone, novolog        Problems adhering to non-medication therapy:  None    Summary of hospitalization:  [facility] discharge summary reviewed  Diagnostic  Tests/Treatments reviewed.  Follow up needed: Chest X-Ray in 6 weeks after discharge.   Other Healthcare Providers Involved in Patient s Care:         MTM  Update since discharge: improved.     Post Discharge Medication Reconciliation: discharge medications reconciled and changed, per note/orders (see AVS).  Plan of care communicated with patient and caregiver     Coding guidelines for this visit:  Type of Medical   Decision Making Face-to-Face Visit       within 7 Days of discharge Face-to-Face Visit        within 14 days of discharge   Moderate Complexity [id number]   High Complexity [id number]            Diabetes Follow-up  Patient reports blood sugar results ranging. 233-404 since discharge.   Patient is checking blood sugars: four times daily.    Blood sugar testing frequency justification: Uncontrolled diabetes and Adjustment of medication(s)  Results are as follows:       As noted.       Diabetic concerns: blood sugar frequently over 200     Symptoms of hypoglycemia (low blood sugar): none     Paresthesias (numbness or burning in feet) or sores: No     Date of last diabetic eye exam: current    Hypertension Follow-up  Patient denies any headaches, dizziness of syncopal episodes. Patient denies any feet or leg swelling.     Outpatient blood pressures are being checked at home.  Results are stable.    Low Salt Diet: no added salt      Problem list and histories reviewed & adjusted, as indicated.  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)     Illiterate     Irritable bowel syndrome     overweight - BMI >35     Takotsubo cardiomyopathy     CAD (coronary artery disease)     Restless legs syndrome (RLS)     CINDY (obstructive sleep apnea)- mild AHI 10.3      Verbal auditory hallucination     Chronic low back pain     Schizoaffective disorder, depressive type (H)     Migraine headache     HTN, goal below 140/90     Health Care Home     Lumbago     Cervicalgia     Cocaine abuse, episodic     Suicidal ideation     Esophageal reflux     Mild nonproliferative diabetic retinopathy (H)     Tardive dyskinesia     Alcohol use     Left cataract     Falls frequently     History of uterine cancer     Depression     Psychophysiological insomnia     Dysuria     Asymptomatic postmenopausal status     Abdominal pain, right lower quadrant     Sepsis (H)     Pneumonia of right lower lobe due to infectious organism (H)     Infectious encephalopathy     Non-intractable vomiting with nausea     Acute respiratory failure with hypoxia (H)     Past Surgical History:   Procedure Laterality Date     C OOPHORECTORMY FOR JALENIG, W/BX  1983    UTERINE     CATARACT IOL, RT/LT Bilateral 2017     COLONOSCOPY N/A 3/16/2017    Procedure: COLONOSCOPY;  Surgeon: Traci Gonzalez MD;  Location: [facility]     Coronary CTA  5/21/2014     HYSTERECTOMY  1983    uterine cancer yearly pap's per provider.     LAPAROSCOPIC CHOLECYSTECTOMY  2008     PHACOEMULSIFICATION CLEAR CORNEA WITH STANDARD INTRAOCULAR LENS IMPLANT Left 5/5/2017    Procedure: PHACOEMULSIFICATION CLEAR CORNEA WITH STANDARD INTRAOCULAR LENS IMPLANT;  LEFT EYE PHACOEMULSIFICATION CLEAR CORNEA WITH STANDARD INTRAOCULAR LENS IMPLANT ;  Surgeon: Tyra Diaz MD;  Location:  [facility]     PHACOEMULSIFICATION CLEAR CORNEA WITH STANDARD INTRAOCULAR LENS IMPLANT Right 6/30/2017    Procedure: PHACOEMULSIFICATION CLEAR CORNEA WITH STANDARD INTRAOCULAR LENS IMPLANT;  RIGHT EYE PHACOEMULSIFICATION CLEAR CORNEA WITH STANDARD INTRAOCULAR LENS IMPLANT;  Surgeon: Tyra Diaz MD;  Location:  [facility]     RELEASE TRIGGER FINGER  10/11/2012    Left thumb. Procedure: RELEASE TRIGGER FINGER;  LEFT THUMB TRIGGER RELEASE;  Surgeon: Tay Langley MD;   Location:  [facility]     RELEASE TRIGGER FINGER Right 2016    Procedure: RELEASE TRIGGER FINGER;  Surgeon: Albino Castañeda MD;  Location: [facility]       Social History   Substance Use Topics     Smoking status: Never Smoker     Smokeless tobacco: Never Used     Alcohol use No      Comment: last month     Family History   Problem Relation Age of Onset     CANCER Mother      BLADDER     Respiratory Mother      COPD     GASTROINTESTINAL DISEASE Mother      CIRRHOSIS OF LI BOLIVAR     Alcohol/Drug Mother      DIABETES Mother      Hypertension Mother      Lipids Mother      C.A.D. Mother      Glaucoma Mother      Alcohol/Drug Sister      MENTAL ILLNESS Sister      Alcohol/Drug Sister      Psychotic Disorder Sister      CANCER Maternal Grandmother      UNKNOWN TYPE     CANCER Brother      COLON     Cancer - colorectal Brother      IN HIS LATE 30S     Alcohol/Drug Brother       OF HEROIN OVERDOSE AT AGE 22 YRS     Macular Degeneration No family hx of          Allergies   Allergen Reactions     Imidazole Antifungals Hives     Tolerates diflucan     Ketoprofen Itching     Pruritis to topical     Lisinopril Hives     Metformin Other (See Comments)     Patient hospitalized for lactic acidosis - admitting provider suspectd caused by metformin     Metronidazole Hives     Posaconazole Hives     Tolerates diflucan     Recent Labs   Lab Test  17   0715  17   0705  17   0705   17   0738  17   1325   17   1358  17   1114   16   0909   10/25/16   0815   16   1350   07/14/15   1215   13   1156   A1C   --    --    --    --   7.6*   --    --   7.0*  7.2*   < >   --    < >   --    < >   --    < >  9.1*   < >  10.8*   LDL   --    --    --    --    --    --    --   64   --    --    --    --    --    --   137*   --   78   < >  90   HDL   --    --    --    --    --    --    --   37*   --    --    --    --    --    --    --    --   39*   --   37*   TRIG   --    --    --    --     "--    --    --   267*   --    --    --    --    --    --    --    --   151*   --   171*   ALT   --    --    --    --   34  45   --   32   --    < >  26   < >  15   < >   --    < >   --    < >  38   CR  0.78  0.89  0.83   < >  0.88  0.90   < >  0.78   --    < >  0.72   < >  0.62   < >   --    < >  0.67   < >  0.54   GFRESTIMATED  76  65  71   < >  66  65   < >  76   --    < >  83   < >  >90  Non  GFR Calc     < >   --    < >  >90  Non  GFR Calc     < >  >90   GFRESTBLACK  >90  79  86   < >  80  78   < >  >90   GFR Calc     --    < >  >90   GFR Calc     < >  >90   GFR Calc     < >   --    < >  >90   GFR Calc     < >  >90   POTASSIUM   --   3.9  4.4   < >  4.0  4.1   < >  4.3   --    < >  4.2   < >  3.8   < >   --    < >  4.3   < >  4.4   TSH   --    --    --    --    --    --    --    --    --    --   2.24   --   0.93   --    --    < >   --    < >  1.42    < > = values in this interval not displayed.      BP Readings from Last 3 Encounters:   09/07/17 116/74   09/04/17 138/65   08/22/17 110/58    Wt Readings from Last 3 Encounters:   09/07/17 223 lb (101.2 kg)   09/04/17 224 lb 6.4 oz (101.8 kg)   08/22/17 229 lb 8 oz (104.1 kg)         Labs reviewed in EPIC    Reviewed and updated as needed this visit by Provider    ROS:  Constitutional, HEENT, cardiovascular, pulmonary, gi and gu systems are negative, except as otherwise noted.    OBJECTIVE:   /74 (BP Location: Right arm)  Pulse 81  Temp 98.5  F (36.9  C) (Oral)  Resp 12  Ht 5' 1\" (1.549 m)  Wt 223 lb (101.2 kg)  LMP 01/06/2015  BMI 42.14 kg/m2  Body mass index is 42.14 kg/(m^2).  GENERAL: healthy, alert and no distress  EYES: Eyes grossly normal to inspection, PERRL and conjunctivae and sclerae normal  NECK: no adenopathy, no asymmetry, masses, or scars and thyroid normal to palpation  RESP: expiratory wheezes and bronchial breath sounds to LLL  CV: regular " rate and rhythm, normal S1 S2, no S3 or S4, no murmur, click or rub, no peripheral edema and peripheral pulses strong  MS: no gross musculoskeletal defects noted, no edema    Diagnostic Test Results:  none   ASSESSMENT/PLAN:   1. Cough  Patient reports that cough is better now compared to when she came from the hospital. Encouraged patient to continue doing deep breathing and coughing to keep her airway open.     - Speech Therapy Referral  - XR Video Swallow w Esophagram - Order with Speech Therapy Referral; Future    2. 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, with long-term current use of insulin, unspecified laterality (H)  Ongoing.     - insulin glargine (LANTUS) 100 UNIT/ML injection; Inject 35 Units Subcutaneous At Bedtime  Dispense: 3 mL; Refill: 3  -Increased Lantus dose.     3. Dyspnea on exertion  Reports some shortness of breath with minimal activity. Encouraged deep breathing exercise.   Assess at follow-up visit.   Patient needs to do PFT's.     - albuterol (PROAIR HFA/PROVENTIL HFA/VENTOLIN HFA) 108 (90 BASE) MCG/ACT Inhaler; Inhale 2 puffs into the lungs every 6 hours as needed for shortness of breath / dyspnea or wheezing  Dispense: 3 Inhaler; Refill: 1    4. HTN, goal below 140/90  Improving.    -Continue with current therapy.       Patient Instructions   -Video swallow study ordered.   -Call clinic with any questions or concerns.         ART Anand [facility] PRIMARY CARE

## 2017-09-07 NOTE — MR AVS SNAPSHOT
After Visit Summary   9/7/2017    Nena Tang    MRN: [id number]           Patient Information     Date Of Birth          1960        Visit Information        Provider Department      9/7/2017 11:30 AM Kenzie Sheehan [facility] Primary [facility] MTM        Today's Diagnoses     Pneumonia of right lower lobe due to infectious organism (H)    -  1    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, with long-term current use of insulin, unspecified laterality (H)        HTN, goal below 140/90           Follow-ups after your visit        Your next 10 appointments already scheduled     Sep 28, 2017  1:30 PM CDT   Video Swallow with [facility] SLP OP VFSS   [facility] Speech Therapy ([facility])    [address]  [facility] [address] [phone]            Sep 28, 2017  1:30 PM CDT   XR VIDEO SPEECH EVALUATION WITH ESOPHAGRAM with [email or web address], [facility] ([facility])    [address]  [facility] [address] [phone]           Please bring a list of your current medicines to your exam. (Include vitamins, minerals and over-the-counter medicines.) Leave your valuables at home.  Tell the doctor if there is a chance you could be pregnant.  Do not eat for 4 hours before the exam. Keep drinking clear liquids until 2 hours before the exam.  You may take pain medicine (with a sip of water) up to 4 hours before the exam.  Do not swallow any other medicines unless your doctor tells you to. Talk to your doctor to be sure it s safe to stop your medicines.  Please call the Imaging Department at your exam site with any questions.            Oct 05, 2017 11:00 AM CDT   Return Visit with ART Wilburn CNP   [facility] ([facility])    [address]   [address]  [facility]  [address]   [phone]            Oct 05, 2017 11:00 AM CDT   Return Visit with BIN Mondragon   [facility]    [address]  [address]  [facility] [address]   [phone]            Oct 16, 2017  9:00 AM CDT   XR CHEST 2 VIEWS with [email or web address]   [facility] ([facility])    [address]  [facility] [id number] [phone]           Please bring a list of your current medicines to your exam. (Include vitamins, minerals and over-thecounter medicines.) Leave your valuables at home.  Tell your doctor if there is a chance you may be pregnant.  You do not need to do anything special for this exam.            Nov 15, 2017 10:15 AM CST   RETURN RETINA with Tiburcio Chacon MD   [facility] ([facility])    [address]  [address]  [address] [address]  [facility] [address] [phone]              Who to contact     If you have questions or need follow up information about today's clinic visit or your schedule please contact [facility] PRIMARY CARE MTM directly at [phone].  Normal or non-critical lab and imaging results will be communicated to you by MyChart, letter or phone within 4 business days after the clinic has received the results. If you do not hear from us within 7 days, please contact the clinic through MyChart or phone. If you have a critical or abnormal lab result, we will notify you by phone as soon as possible.  Submit refill requests through [employer] or call your pharmacy and they will forward the refill request to us. Please allow 3 business days for your refill to be completed.          Additional Information About Your Visit        [employer] Information     [employer] gives you secure access to your electronic health record. If you see a primary care provider, you can also send messages to your  care team and make appointments. If you have questions, please call your primary care clinic.  If you do not have a primary care provider, please call [phone] and they will assist you.        Care EveryWhere ID     This is your Care EveryWhere ID. This could be used by other organizations to access your [facility] medical records  [id number]        Your Vitals Were     Last Period                   01/06/2015            Blood Pressure from Last 3 Encounters:   09/07/17 116/74   09/04/17 138/65   08/22/17 110/58    Weight from Last 3 Encounters:   09/07/17 223 lb (101.2 kg)   09/04/17 224 lb 6.4 oz (101.8 kg)   08/22/17 229 lb 8 oz (104.1 kg)              Today, you had the following     No orders found for display         Today's Medication Changes          These changes are accurate as of: 9/7/17 11:59 PM.  If you have any questions, ask your nurse or doctor.               Start taking these medicines.        Dose/Directions    albuterol 108 (90 BASE) MCG/ACT Inhaler   Commonly known as:  PROAIR HFA/PROVENTIL HFA/VENTOLIN HFA   Used for:  Dyspnea on exertion   Started by:  [place]ena Weber APRN CNP        Dose:  2 puff   Inhale 2 puffs into the lungs every 6 hours as needed for shortness of breath / dyspnea or wheezing   Quantity:  3 Inhaler   Refills:  1         These medicines have changed or have updated prescriptions.        Dose/Directions    insulin glargine 100 UNIT/ML injection   Commonly known as:  LANTUS   This may have changed:  how much to take   Used for:  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, with long-term current use of insulin, unspecified laterality (H)   Changed by:  Rowena Weber APRN CNP        Dose:  35 Units   Inject 35 Units Subcutaneous At Bedtime   Quantity:  3 mL   Refills:  3            Where to get your medicines      These medications were sent to [place], [place] - [address] [place] [address]  [facility] [address]     Phone:  [phone]     albuterol 108 (90 BASE) MCG/ACT Inhaler    insulin glargine 100 UNIT/ML injection                Primary Care Provider Office Phone # Fax #    ART Wilburn [phone] [address] [facility] [address]  [facility] [address]        Goals        General    Medical (pt-stated)     Notes - Note created  7/7/2016  9:15 AM by Chelsea Pulido, RN    Get blood sugars under control    Improve medication compliance    As of today's date 7/7/2016 goal is met at 0 - 25%.   Goal Status:  Active          Equal Access to Services     RAMESH GARRIDO : Hadii samira wadeo Sodelphine, waaxda luqadaha, qaybta kaalmada adeegyada, lorena ellison . So [facility] [phone].    ATENCIÓN: Si habla español, tiene a trinh disposición servicios gratuitos de asistencia lingüística. Llame al [phone].    We comply with applicable federal civil rights laws and [place] laws. We do not discriminate on the basis of race, color, national origin, age, disability sex, sexual orientation or gender identity.            Thank you!     Thank you for choosing [facility] PRIMARY CARE [facility]  for your care. Our goal is always to provide you with excellent care. Hearing back from our patients is one way we can continue to improve our services. Please take a few minutes to complete the written survey that you may receive in the mail after your visit with us. Thank you!             Your Updated Medication List - Protect others around you: Learn how to safely use, store and throw away your medicines at www.disposemymeds.org.          This list is accurate as of: 9/7/17 11:59 PM.  Always use your most recent med list.                   Brand Name Dispense Instructions for use Diagnosis    ACETAMINOPHEN PO      Take 1,000 mg by mouth every 6 hours as needed for pain or fever        albuterol 108 (90 BASE) MCG/ACT Inhaler     PROAIR HFA/PROVENTIL HFA/VENTOLIN HFA    3 Inhaler    Inhale 2 puffs into the lungs every 6 hours as needed for shortness of breath / dyspnea or wheezing    Dyspnea on exertion       aspirin 81 MG EC tablet    ASPIRIN LOW DOSE    100 tablet    Take 1 tablet (81 mg) by mouth daily    Coronary artery disease involving native heart with angina pectoris, unspecified vessel or lesion type (H)       atorvastatin 80 MG tablet    LIPITOR    28 tablet    TAKE 1 TABLET (80MG) BY MOUTH DAILY    Hyperlipidemia LDL goal <100       BENZTROPINE MESYLATE PO      Take 1 mg by mouth 2 times daily        blood glucose monitoring lancets     1 Box    Use to test blood sugar 2 times daily or as directed.  Ok to substitute alternative if insurance prefers.    Type 2 diabetes mellitus with diabetic neuropathy, with long-term current use of insulin (H)       chlorthalidone 25 MG tablet    HYGROTON    30 tablet    Take 1 tablet (25 mg) by mouth daily    HTN, goal below 140/90       CITALOPRAM HYDROBROMIDE PO      Take 15 mg by mouth daily        gabapentin 300 MG capsule    NEURONTIN    168 capsule    TAKE 2 CAPSULES (600MG) BY MOUTH THREE TIMES A DAY    Type 2 diabetes mellitus with diabetic neuropathy, with long-term current use of insulin (H)       glucose 4 G Chew chewable tablet     20 tablet    Take 2 every 15 minutes for blood sugar <70mg/dL. Recheck blood sugar every 15 minutes until above 70mg/dL, then eat a substantial meal.    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, with long-term current use of insulin, unspecified laterality (H)       HALOPERIDOL PO      Take 5 mg by mouth At Bedtime        ibuprofen 600 MG tablet    ADVIL/MOTRIN    60 tablet    Take 1 tablet (600 mg) by mouth every 4 hours as needed for moderate pain    Closed fracture of one rib of right side, initial encounter       insulin aspart 100 UNIT/ML injection    NovoLOG FLEXPEN    15 mL    10 units AC    Type 2  diabetes mellitus with mild nonproliferative retinopathy without macular edema, with long-term current use of insulin, unspecified laterality (H)       insulin glargine 100 UNIT/ML injection    LANTUS    3 mL    Inject 35 Units Subcutaneous At Bedtime    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, with long-term current use of insulin, unspecified laterality (H)       losartan 100 MG tablet    COZAAR    90 tablet    TAKE 1 TABLET (100MG) BY MOUTH DAILY    Coronary artery disease involving native heart with angina pectoris, unspecified vessel or lesion type (H)       melatonin 5 MG Caps     90 capsule    Take 1 capsule by mouth daily At dinnertime    Insomnia, unspecified type       metoprolol 100 MG 24 hr tablet    TOPROL-XL    28 tablet    TAKE 1 TABLET (100MG) BY MOUTH DAILY    Coronary artery disease involving native heart with angina pectoris, unspecified vessel or lesion type (H)       ONE TOUCH ULTRA test strip   Generic drug:  blood glucose monitoring     150 strip    TEST TWICE DAILY AS DIRECTED    Type 2 diabetes mellitus with diabetic neuropathy, with long-term current use of insulin (H)       * order for DME     1 Device    Equipment being ordered: Rolling walker    Falls frequently       * order for DME     1 each    Hold Novolog if meals are refused.    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, with long-term current use of insulin, unspecified laterality (H)       polyethylene glycol powder    MIRALAX/GLYCOLAX    527 g    TAKE 17 GRAMS (1 CAPFUL) BY MOUTH DAILY    Constipation, unspecified constipation type       ranitidine 300 MG tablet    ZANTAC    28 tablet    TAKE 1 TABLET (300MG) BY MOUTH AT BEDTIME    Gastroesophageal reflux disease without esophagitis       senna-docusate 8.6-50 MG per tablet    SENOKOT-S;PERICOLACE     Take 1 tablet by mouth 2 times daily as needed for constipation        TRULICITY 1.5  MG/0.5ML pen   Generic drug:  dulaglutide     2 mL    INJECT 1.5MG SUBCUTANEOUSLY EVERY SEVEN DAYS    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, with long-term current use of insulin, unspecified laterality (H)       ULTICARE MINI 31G X 6 MM   Generic drug:  insulin pen needle     100 each    USE 4 DAILY OR AS DIRECTED    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, with long-term current use of insulin, unspecified laterality (H)       vitamin D3 97240 UNITS capsule    CHOLECALCIFEROL    4 capsule    TAKE 1 CAPSULE (50,000 UNITS) BY MOUTH ONCE A WEEK    Vitamin D deficiency       * Notice:  This list has 2 medication(s) that are the same as other medications prescribed for you. Read the directions carefully, and ask your doctor or other care provider to review them with you.

## 2017-09-07 NOTE — TELEPHONE ENCOUNTER
Alva with group home called, pt saw Rowena today and pt was told that she would need a Chest X-ray in 5 weeks to f/u pneumonia.  Alva called to scheduled the x-ray but was told by Radiologist at  that they need orders before x-ray can be schedule.  Alva contact info: cell [phone],  Office [phone]  Ok to leave detailed message on either number.        Giorgi Case

## 2017-09-08 NOTE — TELEPHONE ENCOUNTER
Patient needs to be still on Metoprolol  mg daily, this medication dosing was not changed at discharge.     ART Anand CNP

## 2017-09-10 ENCOUNTER — NURSE TRIAGE (OUTPATIENT)
Dept: NURSING | Facility: CLINIC | Age: 57
End: 2017-09-10

## 2017-09-11 ENCOUNTER — TELEPHONE (OUTPATIENT)
Dept: FAMILY MEDICINE | Facility: CLINIC | Age: 57
End: 2017-09-11

## 2017-09-11 ENCOUNTER — ALLIED HEALTH/NURSE VISIT (OUTPATIENT)
Dept: PHARMACY | Facility: CLINIC | Age: 57
End: 2017-09-11
Payer: COMMERCIAL

## 2017-09-11 DIAGNOSIS — Z79.4 TYPE 2 DIABETES MELLITUS WITH MILD NONPROLIFERATIVE RETINOPATHY WITHOUT MACULAR EDEMA, WITH LONG-TERM CURRENT USE OF INSULIN, UNSPECIFIED LATERALITY (H): Primary | ICD-10-CM

## 2017-09-11 DIAGNOSIS — E11.3299 TYPE 2 DIABETES MELLITUS WITH MILD NONPROLIFERATIVE RETINOPATHY WITHOUT MACULAR EDEMA, WITH LONG-TERM CURRENT USE OF INSULIN, UNSPECIFIED LATERALITY (H): Primary | ICD-10-CM

## 2017-09-11 PROCEDURE — 99606 MTMS BY PHARM EST 15 MIN: CPT | Performed by: PHARMACIST

## 2017-09-11 NOTE — MR AVS SNAPSHOT
After Visit Summary   9/11/2017    Nena Tang    MRN: [id number]           Patient Information     Date Of Birth          1960        Visit Information        Provider Department      9/11/2017 2:00 PM Kenzie Sheehan [facility]        Today's Diagnoses     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, with long-term current use of insulin, unspecified laterality (H)    -  1       Follow-ups after your visit        Your next 10 appointments already scheduled     Sep 28, 2017  1:30 PM CDT   Video Swallow with RH SLP OP VFSS   [facility] ([facility])    [address]  [facility] [address] [phone]            Sep 28, 2017  1:30 PM CDT   XR VIDEO SPEECH EVALUATION WITH ESOPHAGRAM with RSCCXR1, [facility] ([facility])    [address]  [facility] [address] [phone]           Please bring a list of your current medicines to your exam. (Include vitamins, minerals and over-the-counter medicines.) Leave your valuables at home.  Tell the doctor if there is a chance you could be pregnant.  Do not eat for 4 hours before the exam. Keep drinking clear liquids until 2 hours before the exam.  You may take pain medicine (with a sip of water) up to 4 hours before the exam.  Do not swallow any other medicines unless your doctor tells you to. Talk to your doctor to be sure it s safe to stop your medicines.  Please call the Imaging Department at your exam site with any questions.            Oct 05, 2017 11:00 AM CDT   Return Visit with ART Wilburn CNP   [facility] ([facility])    [address]   [address]  [facility] [address] [phone]            Oct 05, 2017 11:00 AM CDT   Return Visit with BIN Mondragon    [facility] ([facility])    [address]  [address]  [facility] [address] [phone]            Oct 16, 2017  9:00 AM CDT   XR CHEST 2 VIEWS with RSCCXR1   [facility] [place] ([facility])    [address]  [facility] [id number] [phone]           Please bring a list of your current medicines to your exam. (Include vitamins, minerals and over-thecounter medicines.) Leave your valuables at home.  Tell your doctor if there is a chance you may be pregnant.  You do not need to do anything special for this exam.            Nov 15, 2017 10:15 AM CST   RETURN RETINA with Tiburcio Chacon MD   [facility] ([facility])    [address] [facility]  [address]  [facility] [address] [phone]              Who to contact     If you have questions or need follow up information about today's clinic visit or your schedule please contact [facility] directly at [phone].  Normal or non-critical lab and imaging results will be communicated to you by MyChart, letter or phone within 4 business days after the clinic has received the results. If you do not hear from us within 7 days, please contact the clinic through MyChart or phone. If you have a critical or abnormal lab result, we will notify you by phone as soon as possible.  Submit refill requests through [employer] or call your pharmacy and they will forward the refill request to us. Please allow 3 business days for your refill to be completed.          Additional Information About Your Visit        [employer] Information     [employer] gives you secure access to your electronic health record. If you see a primary care provider, you can also send messages to your care team and make appointments. If you have questions, please call your primary care clinic.  If you do not have a  primary care provider, please call [phone] and they will assist you.        Care EveryWhere ID     This is your Care EveryWhere ID. This could be used by other organizations to access your [place] medical records  [id number]        Your Vitals Were     Last Period                   01/06/2015            Blood Pressure from Last 3 Encounters:   09/07/17 116/74   09/04/17 138/65   08/22/17 110/58    Weight from Last 3 Encounters:   09/07/17 223 lb (101.2 kg)   09/04/17 224 lb 6.4 oz (101.8 kg)   08/22/17 229 lb 8 oz (104.1 kg)              Today, you had the following     No orders found for display         Today's Medication Changes          These changes are accurate as of: 9/11/17  3:04 PM.  If you have any questions, ask your nurse or doctor.               These medicines have changed or have updated prescriptions.        Dose/Directions    insulin glargine 100 UNIT/ML injection   Commonly known as:  LANTUS   This may have changed:  how much to take   Used for:  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, with long-term current use of insulin, unspecified laterality (H)   Changed by:  Kenzie Sheehan        Dose:  40 Units   Inject 40 Units Subcutaneous At Bedtime   Quantity:  3 mL   Refills:  3       NovoLOG FLEXPEN 100 UNIT/ML injection   This may have changed:    - how much to take  - how to take this  - when to take this  - additional instructions   Used for:  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, with long-term current use of insulin, unspecified laterality (H)   Generic drug:  insulin aspart   Changed by:  Kenzie Sheehan        Dose:  15 Units   Inject 15 Units Subcutaneous 3 times daily (with meals) Once daily, can add additional 5 units if BGs are >500mg/dL. Call clinic if BGs continue above 500mg/dL   Quantity:  15 mL   Refills:  1                Primary Care Provider Office Phone # Fax  #    Rowena Weber, APRN [phone]       [address] [facility] [address]  [facility] [address]        Goals        General    Medical (pt-stated)     Notes - Note created  7/7/2016  9:15 AM by Chelsea Pulido RN    Get blood sugars under control    Improve medication compliance    As of today's date 7/7/2016 goal is met at 0 - 25%.   Goal Status:  Active          Equal Access to Services     KIMBERLY [facility] Center[facility]YANNE : Hadii aad ku hadasho Soomaali, waaxda luqadaha, qaybta kaalmada adeegyada, waxay idiin hayaan adeeg kharash la'aan . So [facility] [phone].    ATENCIÓN: Si habla español, tiene a trinh disposición servicios gratuitos de asistencia lingüística. Llame al [phone].    We comply with applicable federal civil rights laws and [place] laws. We do not discriminate on the basis of race, color, national origin, age, disability sex, sexual orientation or gender identity.            Thank you!     Thank you for choosing [facility]  for your care. Our goal is always to provide you with excellent care. Hearing back from our patients is one way we can continue to improve our services. Please take a few minutes to complete the written survey that you may receive in the mail after your visit with us. Thank you!             Your Updated Medication List - Protect others around you: Learn how to safely use, store and throw away your medicines at www.disposemymeds.org.          This list is accurate as of: 9/11/17  3:04 PM.  Always use your most recent med list.                   Brand Name Dispense Instructions for use Diagnosis    ACETAMINOPHEN PO      Take 1,000 mg by mouth every 6 hours as needed for pain or fever        albuterol 108 (90 BASE) MCG/ACT Inhaler    PROAIR HFA/PROVENTIL HFA/VENTOLIN HFA    3 Inhaler    Inhale 2 puffs into the lungs every 6 hours as needed for shortness of breath / dyspnea or wheezing    Dyspnea on exertion       aspirin 81 MG EC tablet    ASPIRIN LOW  DOSE    100 tablet    Take 1 tablet (81 mg) by mouth daily    Coronary artery disease involving native heart with angina pectoris, unspecified vessel or lesion type (H)       atorvastatin 80 MG tablet    LIPITOR    28 tablet    TAKE 1 TABLET (80MG) BY MOUTH DAILY    Hyperlipidemia LDL goal <100       BENZTROPINE MESYLATE PO      Take 1 mg by mouth 2 times daily        blood glucose monitoring lancets     1 Box    Use to test blood sugar 2 times daily or as directed.  Ok to substitute alternative if insurance prefers.    Type 2 diabetes mellitus with diabetic neuropathy, with long-term current use of insulin (H)       chlorthalidone 25 MG tablet    HYGROTON    30 tablet    Take 1 tablet (25 mg) by mouth daily    HTN, goal below 140/90       CITALOPRAM HYDROBROMIDE PO      Take 15 mg by mouth daily        gabapentin 300 MG capsule    NEURONTIN    168 capsule    TAKE 2 CAPSULES (600MG) BY MOUTH THREE TIMES A DAY    Type 2 diabetes mellitus with diabetic neuropathy, with long-term current use of insulin (H)       glucose 4 G Chew chewable tablet     20 tablet    Take 2 every 15 minutes for blood sugar <70mg/dL. Recheck blood sugar every 15 minutes until above 70mg/dL, then eat a substantial meal.    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, with long-term current use of insulin, unspecified laterality (H)       HALOPERIDOL PO      Take 5 mg by mouth At Bedtime        ibuprofen 600 MG tablet    ADVIL/MOTRIN    60 tablet    Take 1 tablet (600 mg) by mouth every 4 hours as needed for moderate pain    Closed fracture of one rib of right side, initial encounter       insulin glargine 100 UNIT/ML injection    LANTUS    3 mL    Inject 40 Units Subcutaneous At Bedtime    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, with long-term current use of insulin, unspecified laterality (H)       losartan 100 MG tablet    COZAAR    90 tablet    TAKE 1  TABLET (100MG) BY MOUTH DAILY    Coronary artery disease involving native heart with angina pectoris, unspecified vessel or lesion type (H)       melatonin 5 MG Caps     90 capsule    Take 1 capsule by mouth daily At dinnertime    Insomnia, unspecified type       metoprolol 100 MG 24 hr tablet    TOPROL-XL    28 tablet    TAKE 1 TABLET (100MG) BY MOUTH DAILY    Coronary artery disease involving native heart with angina pectoris, unspecified vessel or lesion type (H)       NovoLOG FLEXPEN 100 UNIT/ML injection   Generic drug:  insulin aspart     15 mL    Inject 15 Units Subcutaneous 3 times daily (with meals) Once daily, can add additional 5 units if BGs are >500mg/dL. Call clinic if BGs continue above 500mg/dL    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, with long-term current use of insulin, unspecified laterality (H)       ONE TOUCH ULTRA test strip   Generic drug:  blood glucose monitoring     150 strip    TEST TWICE DAILY AS DIRECTED    Type 2 diabetes mellitus with diabetic neuropathy, with long-term current use of insulin (H)       * order for DME     1 Device    Equipment being ordered: Rolling walker    Falls frequently       * order for DME     1 each    Hold Novolog if meals are refused.    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, with long-term current use of insulin, unspecified laterality (H)       polyethylene glycol powder    MIRALAX/GLYCOLAX    527 g    TAKE 17 GRAMS (1 CAPFUL) BY MOUTH DAILY    Constipation, unspecified constipation type       ranitidine 300 MG tablet    ZANTAC    28 tablet    TAKE 1 TABLET (300MG) BY MOUTH AT BEDTIME    Gastroesophageal reflux disease without esophagitis       senna-docusate 8.6-50 MG per tablet    SENOKOT-S;PERICOLACE     Take 1 tablet by mouth 2 times daily as needed for constipation        TRULICITY 1.5 MG/0.5ML pen   Generic drug:  dulaglutide     2 mL    INJECT 1.5MG  SUBCUTANEOUSLY EVERY SEVEN DAYS    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, with long-term current use of insulin, unspecified laterality (H)       ULTICARE MINI 31G X 6 MM   Generic drug:  insulin pen needle     100 each    USE 4 DAILY OR AS DIRECTED    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, with long-term current use of insulin, unspecified laterality (H)       vitamin D3 54481 UNITS capsule    CHOLECALCIFEROL    4 capsule    TAKE 1 CAPSULE (50,000 UNITS) BY MOUTH ONCE A WEEK    Vitamin D deficiency       * Notice:  This list has 2 medication(s) that are the same as other medications prescribed for you. Read the directions carefully, and ask your doctor or other care provider to review them with you.

## 2017-09-11 NOTE — TELEPHONE ENCOUNTER
Alva care taker from [facility] calling regarding . Patient recently had insulin doses decreased and now experiencing high blood sugar. Someone from clinic will be addressing blood sugars tomorrow. Group home is instructed to call provider for blood sugars >450. Connected Alva with answering service who has Betty Jessica NP on call for group and is available by cell so they will connect her with caller.   Reason for Disposition    Blood glucose > 400 mg/dl (22 mmol/l)    Additional Information    Negative: Unconscious or difficult to awaken    Negative: Acting confused (e.g., disoriented, slurred speech)    Negative: Very weak (e.g., can't stand)    Negative: Sounds like a life-threatening emergency to the triager    Negative: [1] Vomiting AND [2] signs of dehydration (e.g., very dry mouth, lightheaded, etc.)    Negative: [1] Blood glucose > 240 mg/dl (13 mmol/l) AND [2] rapid breathing    Negative: Blood glucose > 500 mg/dl (27.5 mmol/l)    Negative: [1] Blood glucose > 240 mg/dl (13 mmol/l) AND [2] urine ketones moderate-large (or more than 1+)    Negative: [1] Blood glucose > 240 mg/dl (13 mmol/l) AND [2] blood ketones > 1.5 mmol/l    Negative: [1] Blood glucose > 240 mg/dl (13 mmol/l) AND [2] vomiting AND [3] unable to check for ketones (in blood or urine)    Negative: [1] New onset Diabetes suspected (e.g., frequent urination, weak, weight loss) AND [2] vomiting or rapid breathing    Negative: Vomiting lasts > 4 hours    Negative: Patient sounds very sick or weak to the triager    Negative: Fever > 100.5 F (38.1 C)    Protocols used: DIABETES - HIGH BLOOD SUGAR-ADULT-

## 2017-09-11 NOTE — TELEPHONE ENCOUNTER
Reason for Call:   call back    Detailed comments: Alva with group home called pt blood sugars are running high, last night it was 431, this morning at 8 am it was 253.    Phone Number Patient can be reached at: Other phone number: Alva [phone]    Best Time: anytime    Can we leave a detailed message on this number? NO    Call taken on 9/11/2017 at 11:52 AM by Giorgi Case

## 2017-09-11 NOTE — PROGRESS NOTES
SUBJECTIVE/OBJECTIVE:                                                    Nena Tang is a 57 year old female called for a follow-up visit for Medication Therapy Management.  She was referred to me from Jud King, PCP is now Rowena Weber. Spoke exclusively with Alva Barnes today regarding the pt.     Chief Complaint: Follow up from our visit on 17.  DM f/up.     Medication Adherence: Pt did spend several days with her family rather than in her foster care house and thought that she didn't have her pen needles with her. She restarted insulin on  in the afternoon.     Diabetes:  Pt currently taking Lantus 35units daily at bedtime, Novolog 10units TID prior to meals, Trulicity 1.5mg weekly. Doses of insulin were decreased by 50% when in the hospital.  Pt is not experiencing side effects. Alva is also asking today for a reorder on pt's dosing instructions for BGs >500mg/dL because they were discontinued at discharge.  SMB-2 times daily - fasting and later in the day.   Ranges (based on BG log): 479mg/dL while at her family's house.   : 370, 492mg/dL  10/10: 409, 333, 453, 413 mg/dL  453mg/dL last night  253 and 278mg/dL this morning  Symptoms of low blood sugar? none. Frequency of hypoglycemia? never.  Recent symptoms of high blood sugar? vision changes, polydipsia, fatigue, tingling and numbness  Microalbumin is < 30 mg/g. Pt is taking an ACEi/ARB.  Aspirin: Taking 81mg daily and denies side effects    Current labs include:  BP Readings from Last 3 Encounters:   17 116/74   17 138/65   17 110/58     Lab Results   Component Value Date    A1C 7.6 2017    A1C 7.0 2017    A1C 7.2 2017    A1C 7.1 2017    A1C 9.7 2016     Recent Labs   Lab Test  16   1350  07/14/15   1215   13   1156   CHOL   --   147   --   161   HDL   --   39*   --   37*   LDL  137*  78   < >  90   TRIG   --   151*   --   171*   CHOLHDLRATIO   --   3.8   --   4.4     < > = values in this interval not displayed.       ALT       23   5/3/2016    MICROL       23   7/13/2016  MICROALBUMIN    11.39   7/13/2016    Last Basic Metabolic Panel:  NA      134   6/15/2016   POTASSIUM      4.5   6/15/2016  CHLORIDE      104   6/15/2016  BUN       19   6/15/2016  BUN     13.5   5/19/2009  CR     0.70   6/15/2016    GFR Estimate If Black   Date Value Ref Range Status   09/04/2017 >90 >60 mL/min/1.7m2 Final     Comment:      GFR Calc   09/03/2017 79 >60 mL/min/1.7m2 Final     Comment:      GFR Calc   09/02/2017 86 >60 mL/min/1.7m2 Final     Comment:      GFR Calc     TSH   Date Value Ref Range Status   12/29/2016 2.24 0.40 - 4.00 mU/L Final   ]  LMP 01/06/2015    Most Recent Immunizations   Administered Date(s) Administered     Influenza (IIV3) 09/24/2012     Influenza Vaccine IM 3yrs+ 4 Valent IIV4 09/16/2016     Mantoux 07/10/2014     Pneumococcal 23 valent 01/22/2009     TDAP Vaccine (Adacel) 01/22/2009     ASSESSMENT:                                                    Current medications were reviewed with her today only as above.     Medication Adherence:  Worsened since she wasn't at her foster home, however will likely be improved now.     Diabetes:  Worsened. Should increase both insulins today, as nighttime and fasting values are out of range. Should increase Lantus to 40 units daily and should also increase Novolog to 15 units before meals (prior to hospitalization, pt was moderately well controlled on 50 units of Lantus daily and 20 units of Novolog before meals). Should also have the plan of what to do if BGs are over 500mg/dL    PLAN:                                                      1. Increase Lantus to 40 units daily.   2. Increase Novolog to 15 units before each meal.   3. Will reorder Novolog 1 time dose of 5 units if BGs are >500mg/dL    I spent 10 minutes with this patient today.  All changes were made via collaborative  practice agreement with Rowena Weber. A copy of the visit note was provided to the patient's primary care provider.     Will follow up on Monday via phone.    The patient was faxed a summary of these recommendations as an after visit summary.    Kenzie Sheehan, PharmD  Medication Therapy Management Pharmacist  Pager: [phone]

## 2017-09-11 NOTE — TELEPHONE ENCOUNTER
Will forward to PCP as FYI.      Please advise if you would like to change anything.  Pt's next Appt is on 10/5/17    Indra Le RN

## 2017-09-11 NOTE — TELEPHONE ENCOUNTER
Called Alva back, see MTM telemed appointment.     Kenzie Sheehan, PharmD  Medication Therapy Management Pharmacist  Pager: [phone]

## 2017-09-18 ENCOUNTER — TELEPHONE (OUTPATIENT)
Dept: FAMILY MEDICINE | Facility: CLINIC | Age: 57
End: 2017-09-18

## 2017-09-18 DIAGNOSIS — E55.9 VITAMIN D DEFICIENCY: ICD-10-CM

## 2017-09-18 RX ORDER — CHOLECALCIFEROL (VITAMIN D3) 1250 MCG
CAPSULE ORAL
Qty: 4 CAPSULE | Refills: 3 | Status: SHIPPED | OUTPATIENT
Start: 2017-09-18 | End: 2018-01-24

## 2017-09-18 NOTE — TELEPHONE ENCOUNTER
Incoming call from pt's group home stating that pt has been nauseous the past couple hours, and has had an urge to vomit, but is unable to vomit.   Please follow up. Contact info: [phone], Alva Valenzuela

## 2017-09-19 ENCOUNTER — TRANSFERRED RECORDS (OUTPATIENT)
Dept: HEALTH INFORMATION MANAGEMENT | Facility: CLINIC | Age: 57
End: 2017-09-19

## 2017-09-25 ENCOUNTER — TRANSFERRED RECORDS (OUTPATIENT)
Dept: HEALTH INFORMATION MANAGEMENT | Facility: CLINIC | Age: 57
End: 2017-09-25

## 2017-09-27 ENCOUNTER — TELEPHONE (OUTPATIENT)
Dept: FAMILY MEDICINE | Facility: CLINIC | Age: 57
End: 2017-09-27

## 2017-09-27 DIAGNOSIS — Z79.4 TYPE 2 DIABETES MELLITUS WITH DIABETIC NEUROPATHY, WITH LONG-TERM CURRENT USE OF INSULIN (H): ICD-10-CM

## 2017-09-27 DIAGNOSIS — E11.40 TYPE 2 DIABETES MELLITUS WITH DIABETIC NEUROPATHY, WITH LONG-TERM CURRENT USE OF INSULIN (H): ICD-10-CM

## 2017-09-27 DIAGNOSIS — I10 HTN, GOAL BELOW 140/90: ICD-10-CM

## 2017-09-27 RX ORDER — CHLORTHALIDONE 25 MG/1
25 TABLET ORAL DAILY
Qty: 30 TABLET | Refills: 0 | Status: SHIPPED | OUTPATIENT
Start: 2017-09-27 | End: 2017-10-16

## 2017-09-27 NOTE — TELEPHONE ENCOUNTER
Reason for Call:  Glucometer home, test strips     Detailed comments: Kalie with group home called, pt need a new glucometer and test strips, pt previous machine is not working. They need this done as soon as possible.  Phar of choice is: [facility] in [place] [address]  Tel: [phone]    Phone Number Patient can be reached at: Kalie cell # [phone] or group home # [phone]    Best Time: anytime    Can we leave a detailed message on this number? YES    Call taken on 9/27/2017 at 11:55 AM by Giorgi Case

## 2017-09-27 NOTE — TELEPHONE ENCOUNTER
Fax received 09/27/17    chlorthalidone (HYGROTON) 25 MG      Last Written Prescription Date: 09/04/17  Last Fill Quantity: 30, # refills: 0  Last Office Visit with G, P or [facility] prescribing provider: 09/07/17  Next 5 appointments (look out 90 days)     Oct 05, 2017 11:00 AM CDT   Return Visit with ART Wilburn CNP   [facility] ([facility]    [address]  [address]  [facility] [address]   [phone]            Oct 05, 2017 11:00 AM CDT   Return Visit with BIN Mondragon   [facility] ([facility])    [address]  [address]  [facility] [address]   [phone]                 Potassium   Date Value Ref Range Status   09/03/2017 3.9 3.4 - 5.3 mmol/L Final     Creatinine   Date Value Ref Range Status   09/04/2017 0.78 0.52 - 1.04 mg/dL Final     BP Readings from Last 3 Encounters:   09/07/17 116/74   09/04/17 138/65   08/22/17 110/58

## 2017-09-28 ENCOUNTER — HOSPITAL ENCOUNTER (OUTPATIENT)
Dept: SPEECH THERAPY | Facility: CLINIC | Age: 57
Setting detail: THERAPIES SERIES
End: 2017-09-28
Attending: NURSE PRACTITIONER
Payer: MEDICARE

## 2017-09-28 ENCOUNTER — HOSPITAL ENCOUNTER (OUTPATIENT)
Dept: GENERAL RADIOLOGY | Facility: CLINIC | Age: 57
Discharge: HOME | End: 2017-09-28
Attending: NURSE PRACTITIONER | Admitting: NURSE PRACTITIONER
Payer: MEDICARE

## 2017-09-28 DIAGNOSIS — R05.9 COUGH: ICD-10-CM

## 2017-09-28 PROCEDURE — G8997 SWALLOW GOAL STATUS: HCPCS | Mod: GN,CI

## 2017-09-28 PROCEDURE — G8996 SWALLOW CURRENT STATUS: HCPCS | Mod: GN,CI

## 2017-09-28 PROCEDURE — 92526 ORAL FUNCTION THERAPY: CPT | Mod: GN

## 2017-09-28 PROCEDURE — 40000211 ZZHC STATISTIC SLP  DEPARTMENT VISIT

## 2017-09-28 PROCEDURE — 92611 MOTION FLUOROSCOPY/SWALLOW: CPT | Mod: GN

## 2017-09-28 PROCEDURE — G8998 SWALLOW D/C STATUS: HCPCS | Mod: GN,CI

## 2017-09-28 NOTE — PROGRESS NOTES
[facility]          OUTPATIENT SWALLOW  EVALUATION  PLAN OF TREATMENT FOR OUTPATIENT REHABILITATION  (COMPLETE FOR INITIAL CLAIMS ONLY)  Patient's Last Name, First Name, M.I.  YOB: 1960  Nena Tang     Provider's Name   [facility]   Medical Record No.  [id number]     Start of Care Date:  09/28/17   Onset Date:  09/07/17 (per MD order date)   Type:     ___PT   ____OT  ___X_SLP Medical Diagnosis:  Oropharyngeal swallow WFL     Treatment Diagnosis:  Oropharyngeal swallow WFL Visits from SOC:  1     _________________________________________________________________________________  Plan of Treatment/Functional Goals:  Planned Therapy Interventions: Dysphagia Treatment  Dysphagia treatment: Compensatory strategies for swallowing, Instruction of safe swallow strategies                     Goals   1. Goal Identifier: LTG        Goal Description: Pt will verbalize understanding and agreement with VFSS results and recommendations       Target Date: 09/28/17       Date Met: 09/28/17                                        Therapy Frequency: other (see comments) (x1 tx following VFSS)       Jolie Sharp, SLP       I CERTIFY THE NEED FOR THESE SERVICES FURNISHED UNDER        THIS PLAN OF TREATMENT AND WHILE UNDER MY CARE     (Physician co-signature of this document indicates review and certification of the therapy plan).                    Certification date from: 09/28/17 Certification date to: 09/29/17          Referring Physician: ART Wilburn CNP    Initial Assessment        See Epic Evaluation Start Of Care Date: 09/28/17

## 2017-09-28 NOTE — TELEPHONE ENCOUNTER
Incoming call from pt's adult foster care provider stating that [facility] charged pt a $37 co-pay for her meter. Pt is wondering if her glucometer can be sent to a home medical supply store to avoid a co-pay.   Please send order to: [employer] in [place]. Ph: [phone]    Val Valenzuela

## 2017-09-28 NOTE — ANESTHESIA POSTPROCEDURE EVALUATION
Patient: Nena Tang    Procedure(s):  Colonoscopy/Dx:Screening/Golytely & pre-op info-PAC # mailed - Wound Class: II-Clean Contaminated    Diagnosis:Colonoscopy W Mac/Dx:Screening/Golytely & pre-op info-PAC # mailed  Diagnosis Additional Information: No value filed.    Anesthesia Type:  MAC    Note:  Anesthesia Post Evaluation    Patient location during evaluation: Bedside  Patient participation: Able to fully participate in evaluation  Level of consciousness: awake and alert  Pain management: adequate  Airway patency: patent  Cardiovascular status: acceptable  Respiratory status: acceptable  Hydration status: acceptable  PONV: none     Anesthetic complications: None          Last vitals:  Vitals:    03/16/17 1122   BP: 163/89   Pulse: 90   Resp: 15   Temp: 36.8  C (98.3  F)   SpO2: 95%         Electronically Signed By: Sofia Cervantes MD  March 16, 2017  1:22 PM  
none

## 2017-09-28 NOTE — PROGRESS NOTES
" FR OP Video swallow study   09/28/17 1300       Present No   General Information   Type Of Visit Initial   Start Of Care Date 09/28/17   Referring Physician ART Wilburn CNP   Orders Evaluate And Treat   Medical Diagnosis dysphagia   Onset Of Illness/injury Or Date Of Surgery 09/07/17  (per MD order date)   Precautions/limitations Fall Precautions   Hearing WFL   Pertinent History of Current Problem/OT: Additional Occupational Profile Info Pt is a 57 year old female with past medical hx of schizoaffective disorder. Pt was recently hospitalized in end of August 2017 for PNA. Pt also with hx of gastroparesis and GERD. Pt reports coughing \"all the time\" with PO and without PO. Pt also endorses some sensation of PO \"going down the wrong way.\" Pt currently eats regular textures and thin liquids. Video swallow study completed per MD orders to further assess oropharyngeal swallow function.    Respiratory Status Room air   Prior Level Of Function Swallowing   Prior Level Of Function Comment Pt currently eats regular textures and thin liquids   Patient Role/employment History Disabled   Living Environment Group Home   Patient/family Goals Pt did not state   Pain Assessment   Pain Reported No   FALL RISK SCREEN   Comments Please see radiology report for further details   Clinical Swallow Evaluation   Oral Musculature generally intact   Structural Abnormalities none present   Dentition present and adequate;other (see comments)  (missing some back molars)   Mucosal Quality adequate   Mandibular Strength and Mobility intact   Oral Labial Strength and Mobility WFL   Lingual Strength and Mobility WFL   Velar Elevation intact   Buccal Strength and Mobility intact   Laryngeal Function Cough;Throat clear;Swallow;Voicing initiated   Oral Musculature Comments WFL   VFSS Evaluation   VFSS Additional Documentation Yes   VFSS Eval: Radiology   Radiologist Dr. Morrow   Views Taken left lateral "   Physical Location of Procedure [facility]   VFSS Eval: Thin Liquid Texture Trial   Mode of Presentation, Thin Liquid cup;self-fed   Order of Presentation 1, 2, 5   Preparatory Phase Poor bolus control   Oral Phase, Thin Liquid Premature pharyngeal entry   Pharyngeal Phase, Thin Liquid Delayed swallow reflex   Rosenbek's Penetration Aspiration Scale: Thin Liquid Trial Results 1 - no aspiration, contrast does not enter airway   Diagnostic Statement No aspiration/penetration; no pharyngeal residuals remaining   VFSS Eval: Puree Solid Texture Trial   Mode of Presentation, Puree spoon;self-fed   Order of Presentation 3   Preparatory Phase Poor bolus control   Oral Phase, Puree Premature pharyngeal entry   Pharyngeal Phase, Puree Delayed swallow reflex   Rosenbek's Penetration Aspiration Scale: Puree Food Trial Results 1 - no aspiration, contrast does not enter airway   Diagnostic Statement No aspiration/penetration; no pharyngeal residuals remaining   VFSS Eval: Solid Food Texture Trial   Mode of Presentation, Solid self-fed   Order of Presentation 4   Preparatory Phase Poor bolus control   Oral Phase, Solid Premature pharyngeal entry   Pharyngeal Phase, Solid Delayed swallow reflex   Rosenbek's Penetration Aspiration Scale: Solid Food Trial Results 1 - no aspiration, contrast does not enter airway   Diagnostic Statement No aspiration/penetration; no pharyngeal residuals remaining   FEES Evaluation   Additional Documentation No   Swallow Compensations   Swallow Compensations Alternate viscosity of consistencies;Pacing;Reduce amounts;Multiple swallow   Results No difficulties noted   Educational Assessment   Barriers to Learning Cognitive   Preferred Learning Style Listening;Demonstration;Pictures/video   Esophageal Phase of Swallow   Patient reports or presents with symptoms of esophageal dysphagia Yes   Esophageal sweep performed during today s vidofluoroscopic exam  No   Esophageal comments Pt with hx of GERD   General  Therapy Interventions   Planned Therapy Interventions Dysphagia Treatment   Dysphagia treatment Compensatory strategies for swallowing;Instruction of safe swallow strategies   Swallow Eval: Clinical Impressions   Skilled Criteria for Therapy Intervention Skilled criteria met.  Treatment indicated.   Dysphagia Outcome Severity Scale (MEGAN) Level 6 - MEGAN   Treatment Diagnosis Oropharyngeal swallow WFL   Diet texture recommendations Regular diet;Thin liquids   Recommended Feeding/Eating Techniques alternate between small bites and sips of food/liquid;hard swallow w/ each bite or sip;maintain upright posture during/after eating for 30 mins;small sips/bites   Rehab Potential good, to achieve stated therapy goals   Demonstrates Need for Referral to Another Service other (see comments)  (pulmonary medicine; GI medicine)   Therapy Frequency other (see comments)  (x1 tx following VFSS)   Anticipated Discharge Disposition home   Risks and Benefits of Treatment have been explained. Yes   Patient, family and/or staff in agreement with Plan of Care Yes   Clinical Impression Comments SLP: Video swallow study completed per MD orders. Pts oropharyngeal swallow is WFL. Pts swallow function characterized by poor bolus control, premature spillage to level of valleculae and pyriforms, and delayed pharyngeal swallow response. No aspiration or penetration observed across any PO trials. No evidence of pharyngeal residuals remaining following PO trials. Recommend regular textures and thin liquids. Pt should be fully upright and alert for all PO, take small single sips/bites, pace self, alternate consistencies, and remain upright for 30-60 minutes following PO. Consider pulmonary and/or GI consult to further assess etiology of chronic cough with and without PO. No further ST needs indicated for dysphagia.    Swallow Goals   SLP Swallow Goals 1   Swallow Goal 1   Goal Identifier LTG    Goal Description Pt will verbalize understanding and  agreement with VFSS results and recommendations   Target Date 09/28/17   Date Met 09/28/17   Total Session Time   Total Session Time 25   Total Evaluation Time 15   Therapy Certification   Certification date from 09/28/17   Certification date to 09/29/17   Medical Diagnosis Oropharyngeal swallow WFL   Certification I certify the need for these services furnished under this plan of treatment and while under my care.  (Physician co-signature of this document indicates review and certification of the therapy plan).

## 2017-09-28 NOTE — TELEPHONE ENCOUNTER
Writer printed Rx and will have PCP sign.  Then will fax to Medical supply store as requested.    Indra Le RN

## 2017-09-29 ENCOUNTER — ALLIED HEALTH/NURSE VISIT (OUTPATIENT)
Dept: PHARMACY | Facility: CLINIC | Age: 57
End: 2017-09-29
Payer: COMMERCIAL

## 2017-09-29 DIAGNOSIS — Z79.4 TYPE 2 DIABETES MELLITUS WITH MILD NONPROLIFERATIVE RETINOPATHY WITHOUT MACULAR EDEMA, WITH LONG-TERM CURRENT USE OF INSULIN, UNSPECIFIED LATERALITY (H): ICD-10-CM

## 2017-09-29 DIAGNOSIS — E11.3299 TYPE 2 DIABETES MELLITUS WITH MILD NONPROLIFERATIVE RETINOPATHY WITHOUT MACULAR EDEMA, WITH LONG-TERM CURRENT USE OF INSULIN, UNSPECIFIED LATERALITY (H): ICD-10-CM

## 2017-09-29 PROCEDURE — 99606 MTMS BY PHARM EST 15 MIN: CPT | Mod: GY | Performed by: PHARMACIST

## 2017-09-29 NOTE — PROGRESS NOTES
SUBJECTIVE/OBJECTIVE:                                                    Nena Tang is a 57 year old female called for a follow-up visit for Medication Therapy Management.  She was referred to me from Jud King, PCP is now Rowena Weber. Pt's PCA faxed pt's BGs to us today. Spoke exclusively with Alva Barnes today regarding the pt.    Chief Complaint: Follow up from our visit on 17.  DM f/up.     Medication Adherence: stable per pt    Diabetes:  Pt currently taking Lantus 40units daily at bedtime, Novolog 15units TID prior to meals, Trulicity 1.5mg weekly. Pre pneumonia hospitalization doses were 50 units of Lantus and 20 units of Novolog prior to meals. Pt is not experiencing side effects.  SMB-2 times daily - fasting and later in the day.   Ranges (based on BG log): 171-274mg/dL fasting and 203-425mg/dL later in the day.  Symptoms of low blood sugar? none. Frequency of hypoglycemia? never.  Recent symptoms of high blood sugar? vision changes, polydipsia, fatigue, tingling and numbness  Microalbumin is < 30 mg/g. Pt is taking an ACEi/ARB.  Aspirin: Taking 81mg daily and denies side effects    Current labs include:  BP Readings from Last 3 Encounters:   17 116/74   17 138/65   17 110/58     Lab Results   Component Value Date    A1C 7.6 2017    A1C 7.0 2017    A1C 7.2 2017    A1C 7.1 2017    A1C 9.7 2016     Recent Labs   Lab Test  16   1350  07/14/15   1215   13   1156   CHOL   --   147   --   161   HDL   --   39*   --   37*   LDL  137*  78   < >  90   TRIG   --   151*   --   171*   CHOLHDLRATIO   --   3.8   --   4.4    < > = values in this interval not displayed.       ALT       23   5/3/2016    MICROL       23   2016  MICROALBUMIN    11.39   2016    Last Basic Metabolic Panel:  NA      134   6/15/2016   POTASSIUM      4.5   6/15/2016  CHLORIDE      104   6/15/2016  BUN       19   6/15/2016  BUN     13.5   2009  CR      0.70   6/15/2016    GFR Estimate If Black   Date Value Ref Range Status   09/04/2017 >90 >60 mL/min/1.7m2 Final     Comment:      GFR Calc   09/03/2017 79 >60 mL/min/1.7m2 Final     Comment:      GFR Calc   09/02/2017 86 >60 mL/min/1.7m2 Final     Comment:      GFR Calc     TSH   Date Value Ref Range Status   12/29/2016 2.24 0.40 - 4.00 mU/L Final   ]  LMP 01/06/2015    Most Recent Immunizations   Administered Date(s) Administered     Influenza (IIV3) 09/24/2012     Influenza Vaccine IM 3yrs+ 4 Valent IIV4 09/16/2016     Mantoux 07/10/2014     Pneumococcal 23 valent 01/22/2009     TDAP Vaccine (Adacel) 01/22/2009     ASSESSMENT:                                                    Current medications were reviewed with her today only as above.     Medication Adherence:  Improving.    Diabetes:  Needs impovement. Should increase both insulins today to help with both morning BGs and post prandial values. Pt still not at pre-hospitalization doses however discussed that too fast of increases of the insulin may cause symptoms of hypoglycemia and cause pt to overeat.    PLAN:                                                      1. Increase Lantus to 45 units daily.   2. Increase Novolog to 18 units before each meal.     I spent 10 minutes with this patient today.  All changes were made via collaborative practice agreement with Rowena Weber. A copy of the visit note was provided to the patient's primary care provider.     Will follow up on Thursday via phone.    The patient was faxed a summary of these recommendations as an after visit summary.    Kenzie Sheehan, PharmD  Medication Therapy Management Pharmacist  Pager: [phone]

## 2017-09-29 NOTE — MR AVS SNAPSHOT
After Visit Summary   9/29/2017    Nena Tang    MRN: [id number]           Patient Information     Date Of Birth          1960        Visit Information        Provider Department      9/29/2017 1:30 PM Kenzie Sheehan [facility]        Today's Diagnoses     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, with long-term current use of insulin, unspecified laterality (H)           Follow-ups after your visit        Your next 10 appointments already scheduled     Oct 05, 2017 11:00 AM CDT   [facility] with ART Wilburn CNP   [facility]    [address]  [address]  [facility] [address] [phone]            Oct 05, 2017 11:00 AM CDT   Return Visit with BIN Mondragon   [facility]    [address]  [address]  [facility] [id number] [phone]            Oct 16, 2017  9:00 AM CDT   XR CHEST 2 VIEWS with [id number]   [facility] ([facility]    [address]  [facility] [address] [phone]           Please bring a list of your current medicines to your exam. (Include vitamins, minerals and over-thecounter medicines.) Leave your valuables at home.  Tell your doctor if there is a chance you may be pregnant.  You do not need to do anything special for this exam.            Nov 15, 2017 10:15 AM CST   RETURN RETINA with Tiburcio Chacon MD   [facility] ([facility]    [address]  [address] [facility]  [address]  [facility] [id number] [phone]              Who to contact     If you have questions or need follow up information about today's clinic visit or your schedule please contact [facility] directly at [phone].  Normal or non-critical lab and imaging results will be communicated to you by [employer], letter or phone within 4 business days after the clinic has received the results. If you do not hear from us within 7 days, please contact the clinic through [employer] or phone. If you have a critical or abnormal lab result, we will notify you by phone as soon as possible.  Submit refill requests through [employer] or call your pharmacy and they will forward the refill request to us. Please allow 3 business days for your refill to be completed.          Additional Information About Your Visit        [employer] Information     [employer] gives you secure access to your electronic health record. If you see a primary care provider, you can also send messages to your care team and make appointments. If you have questions, please call your primary care clinic.  If you do not have a primary care provider, please call [phone] and they will assist you.        Care EveryWhere ID     This is your Care EveryWhere ID. This could be used by other organizations to access your [place] medical records  [id number]        Your Vitals Were     Last Period                   01/06/2015            Blood Pressure from Last 3 Encounters:   09/07/17 116/74   09/04/17 138/65   08/22/17 110/58    Weight from Last 3 Encounters:   09/07/17 223 lb (101.2 kg)   09/04/17 224 lb 6.4 oz (101.8 kg)   08/22/17 229 lb 8 oz (104.1 kg)              Today, you had the following     No orders found for display         Today's Medication Changes          These changes are accurate as of: 9/29/17  2:24 PM.  If you have any questions, ask your nurse or doctor.               These medicines have changed or have updated prescriptions.        Dose/Directions    insulin aspart 100 UNIT/ML injection   Commonly known as:  NovoLOG FLEXPEN   This may have changed:    - how much to take  - additional instructions   Used for:  Type 2 diabetes mellitus  with mild nonproliferative retinopathy without macular edema, with long-term current use of insulin, unspecified laterality (H)        Dose:  18 Units   Inject 18 Units Subcutaneous 3 times daily (with meals) Once daily, can add additional 5 units if BGs are >500mg/dL.   Quantity:  15 mL   Refills:  1       insulin glargine 100 UNIT/ML injection   Commonly known as:  LANTUS   This may have changed:  how much to take   Used for:  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, with long-term current use of insulin, unspecified laterality (H)        Dose:  45 Units   Inject 45 Units Subcutaneous At Bedtime   Quantity:  3 mL   Refills:  3            Where to get your medicines      These medications were sent to [address] [address]     Phone:  [phone]     insulin aspart 100 UNIT/ML injection    insulin glargine 100 UNIT/ML injection                Primary Care Provider Office Phone # Fax #    Rowena Mar ART Weber [phone]       [address]  [facility] [address]        Goals        General    Medical (pt-stated)     Notes - Note created  7/7/2016  9:15 AM by Chelsea Pulido RN    Get blood sugars under control    Improve medication compliance    As of today's date 7/7/2016 goal is met at 0 - 25%.   Goal Status:  Active          Equal Access to Services     [facility]: Hadii aad ku hadasho Soomaali, waaxda luqadaha, qaybta kaalmada adeegyada, lorena toscano hayfernando ellison . So [facility] [phone].    ATENCIÓN: Si habla español, tiene a trinh disposición servicios gratuitos de asistencia lingüística. Llame al [phone].    We comply with applicable federal civil rights laws and [place] laws. We do not discriminate on the basis of race, color, national origin, age, disability, sex, sexual orientation, or gender identity.             Thank you!     Thank you for choosing [facility]  for your care. Our goal is always to provide you with excellent care. Hearing back from our patients is one way we can continue to improve our services. Please take a few minutes to complete the written survey that you may receive in the mail after your visit with us. Thank you!             Your Updated Medication List - Protect others around you: Learn how to safely use, store and throw away your medicines at www.disposemymeds.org.          This list is accurate as of: 9/29/17  2:24 PM.  Always use your most recent med list.                   Brand Name Dispense Instructions for use Diagnosis    ACETAMINOPHEN PO      Take 1,000 mg by mouth every 6 hours as needed for pain or fever        albuterol 108 (90 BASE) MCG/ACT Inhaler    PROAIR HFA/PROVENTIL HFA/VENTOLIN HFA    3 Inhaler    Inhale 2 puffs into the lungs every 6 hours as needed for shortness of breath / dyspnea or wheezing    Dyspnea on exertion       aspirin 81 MG EC tablet    ASPIRIN LOW DOSE    100 tablet    Take 1 tablet (81 mg) by mouth daily    Coronary artery disease involving native heart with angina pectoris, unspecified vessel or lesion type (H)       atorvastatin 80 MG tablet    LIPITOR    28 tablet    TAKE 1 TABLET (80MG) BY MOUTH DAILY    Hyperlipidemia LDL goal <100       BENZTROPINE MESYLATE PO      Take 1 mg by mouth 2 times daily        blood glucose monitoring lancets     150 each    Use to test blood sugar 2 times daily or as directed.  Ok to substitute alternative if insurance prefers.    Type 2 diabetes mellitus with diabetic neuropathy, with long-term current use of insulin (H)       blood glucose monitoring meter device kit     1 kit    Use to test blood sugar 2 times daily or as directed.  Ok to substitute alternative if insurance prefers.    Type 2 diabetes mellitus with diabetic neuropathy, with long-term current use of insulin (H)        chlorthalidone 25 MG tablet    HYGROTON    30 tablet    Take 1 tablet (25 mg) by mouth daily    HTN, goal below 140/90       CITALOPRAM HYDROBROMIDE PO      Take 15 mg by mouth daily        gabapentin 300 MG capsule    NEURONTIN    168 capsule    TAKE 2 CAPSULES (600MG) BY MOUTH THREE TIMES A DAY    Type 2 diabetes mellitus with diabetic neuropathy, with long-term current use of insulin (H)       glucose 4 G Chew chewable tablet     20 tablet    Take 2 every 15 minutes for blood sugar <70mg/dL. Recheck blood sugar every 15 minutes until above 70mg/dL, then eat a substantial meal.    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, with long-term current use of insulin, unspecified laterality (H)       HALOPERIDOL PO      Take 5 mg by mouth At Bedtime        ibuprofen 600 MG tablet    ADVIL/MOTRIN    60 tablet    Take 1 tablet (600 mg) by mouth every 4 hours as needed for moderate pain    Closed fracture of one rib of right side, initial encounter       insulin aspart 100 UNIT/ML injection    NovoLOG FLEXPEN    15 mL    Inject 18 Units Subcutaneous 3 times daily (with meals) Once daily, can add additional 5 units if BGs are >500mg/dL.    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, with long-term current use of insulin, unspecified laterality (H)       insulin glargine 100 UNIT/ML injection    LANTUS    3 mL    Inject 45 Units Subcutaneous At Bedtime    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, with long-term current use of insulin, unspecified laterality (H)       losartan 100 MG tablet    COZAAR    90 tablet    TAKE 1 TABLET (100MG) BY MOUTH DAILY    Coronary artery disease involving native heart with angina pectoris, unspecified vessel or lesion type (H)       melatonin 5 MG Caps     90 capsule    Take 1 capsule by mouth daily At dinnertime    Insomnia, unspecified type       metoprolol 100  MG 24 hr tablet    TOPROL-XL    28 tablet    TAKE 1 TABLET (100MG) BY MOUTH DAILY    Coronary artery disease involving native heart with angina pectoris, unspecified vessel or lesion type (H)       * ONE TOUCH ULTRA test strip   Generic drug:  blood glucose monitoring     150 strip    TEST TWICE DAILY AS DIRECTED    Type 2 diabetes mellitus with diabetic neuropathy, with long-term current use of insulin (H)       * blood glucose monitoring test strip    ONE TOUCH VERIO IQ    150 strip    Use to test blood sugar 2 times daily or as directed.  Ok to substitute alternative if insurance prefers.    Type 2 diabetes mellitus with diabetic neuropathy, with long-term current use of insulin (H)       * order for DME     1 Device    Equipment being ordered: Rolling walker    Falls frequently       * order for DME     1 each    Hold Novolog if meals are refused.    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, with long-term current use of insulin, unspecified laterality (H)       polyethylene glycol powder    MIRALAX/GLYCOLAX    527 g    TAKE 17 GRAMS (1 CAPFUL) BY MOUTH DAILY    Constipation, unspecified constipation type       ranitidine 300 MG tablet    ZANTAC    28 tablet    TAKE 1 TABLET (300MG) BY MOUTH AT BEDTIME    Gastroesophageal reflux disease without esophagitis       senna-docusate 8.6-50 MG per tablet    SENOKOT-S;PERICOLACE     Take 1 tablet by mouth 2 times daily as needed for constipation        TRULICITY 1.5 MG/0.5ML pen   Generic drug:  dulaglutide     2 mL    INJECT 1.5MG SUBCUTANEOUSLY EVERY SEVEN DAYS    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, with long-term current use of insulin, unspecified laterality (H)       ULTICARE MINI 31G X 6 MM   Generic drug:  insulin pen needle     100 each    USE 4 DAILY OR AS DIRECTED    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, with  long-term current use of insulin, unspecified laterality (H)       vitamin D3 92499 UNITS capsule    CHOLECALCIFEROL    4 capsule    TAKE 1 CAPSULE (50,000 UNITS) BY MOUTH ONCE A WEEK    Vitamin D deficiency       * Notice:  This list has 4 medication(s) that are the same as other medications prescribed for you. Read the directions carefully, and ask your doctor or other care provider to review them with you.

## 2017-10-02 ENCOUNTER — TELEPHONE (OUTPATIENT)
Dept: FAMILY MEDICINE | Facility: CLINIC | Age: 57
End: 2017-10-02

## 2017-10-02 NOTE — TELEPHONE ENCOUNTER
Reason for Call:  Form, our goal is to have forms completed with 72 hours, however, some forms may require a visit or additional information.    Type of letter, form or note:  medical    Who is the form from?: [facility] (if other please explain)    Where did the form come from: form was mailed in    What clinic location was the form placed at?: [facility]    Where the form was placed: 's Box    What number is listed as a contact on the form?: NA       Additional comments: Attached envelope for mailing. Thanks!    Call taken on 10/2/2017 at 10:24 AM by Sandhya Mckeon

## 2017-10-03 ENCOUNTER — TELEPHONE (OUTPATIENT)
Dept: FAMILY MEDICINE | Facility: CLINIC | Age: 57
End: 2017-10-03

## 2017-10-03 NOTE — TELEPHONE ENCOUNTER
Faxed received from [facility] , Completed [place] portion of form, Please review  & sign.    Karla Vargas, CMA

## 2017-10-03 NOTE — TELEPHONE ENCOUNTER
Mailed completed forms to :  [facility]  [address]  [place] [address]    Karla Vargas CMA

## 2017-10-03 NOTE — TELEPHONE ENCOUNTER
Completed and faxed to [facility] [phone], Document sent to abstract for scanning    Karla Vargas CMA

## 2017-10-10 ENCOUNTER — TELEPHONE (OUTPATIENT)
Dept: FAMILY MEDICINE | Facility: CLINIC | Age: 57
End: 2017-10-10

## 2017-10-16 ENCOUNTER — HOSPITAL ENCOUNTER (OUTPATIENT)
Dept: GENERAL RADIOLOGY | Facility: CLINIC | Age: 57
Discharge: HOME | End: 2017-10-16
Attending: NURSE PRACTITIONER | Admitting: NURSE PRACTITIONER
Payer: MEDICARE

## 2017-10-16 DIAGNOSIS — J18.9 PNEUMONIA OF RIGHT LOWER LOBE DUE TO INFECTIOUS ORGANISM: ICD-10-CM

## 2017-10-16 PROCEDURE — 71020 XR CHEST 2 VW: CPT

## 2017-10-23 ENCOUNTER — OFFICE VISIT (OUTPATIENT)
Dept: BEHAVIORAL HEALTH | Facility: CLINIC | Age: 57
End: 2017-10-23
Payer: MEDICARE

## 2017-10-23 ENCOUNTER — OFFICE VISIT (OUTPATIENT)
Dept: FAMILY MEDICINE | Facility: CLINIC | Age: 57
End: 2017-10-23
Payer: MEDICARE

## 2017-10-23 VITALS
DIASTOLIC BLOOD PRESSURE: 56 MMHG | BODY MASS INDEX: 42.7 KG/M2 | WEIGHT: 226 LBS | TEMPERATURE: 98.1 F | OXYGEN SATURATION: 95 % | HEART RATE: 80 BPM | RESPIRATION RATE: 16 BRPM | SYSTOLIC BLOOD PRESSURE: 90 MMHG

## 2017-10-23 DIAGNOSIS — R41.89 COGNITIVE CHANGES: ICD-10-CM

## 2017-10-23 DIAGNOSIS — R45.89 THOUGHTS OF SELF HARM: ICD-10-CM

## 2017-10-23 DIAGNOSIS — Z79.4 TYPE 2 DIABETES MELLITUS WITH MILD NONPROLIFERATIVE RETINOPATHY WITHOUT MACULAR EDEMA, WITH LONG-TERM CURRENT USE OF INSULIN, UNSPECIFIED LATERALITY (H): Primary | ICD-10-CM

## 2017-10-23 DIAGNOSIS — E11.3299 TYPE 2 DIABETES MELLITUS WITH MILD NONPROLIFERATIVE RETINOPATHY WITHOUT MACULAR EDEMA, WITH LONG-TERM CURRENT USE OF INSULIN, UNSPECIFIED LATERALITY (H): Primary | ICD-10-CM

## 2017-10-23 DIAGNOSIS — F25.1 SCHIZOAFFECTIVE DISORDER, DEPRESSIVE TYPE (H): Primary | ICD-10-CM

## 2017-10-23 DIAGNOSIS — F32.1 MODERATE MAJOR DEPRESSION (H): ICD-10-CM

## 2017-10-23 DIAGNOSIS — E08.3293 MILD NONPROLIFERATIVE DIABETIC RETINOPATHY OF BOTH EYES WITHOUT MACULAR EDEMA ASSOCIATED WITH DIABETES MELLITUS DUE TO UNDERLYING CONDITION (H): ICD-10-CM

## 2017-10-23 LAB
ALBUMIN SERPL-MCNC: 3.5 G/DL (ref 3.4–5)
ALP SERPL-CCNC: 116 U/L (ref 40–150)
ALT SERPL W P-5'-P-CCNC: 23 U/L (ref 0–50)
AMMONIA PLAS-SCNC: 24 UMOL/L (ref 10–50)
ANION GAP SERPL CALCULATED.3IONS-SCNC: 8 MMOL/L (ref 3–14)
AST SERPL W P-5'-P-CCNC: 11 U/L (ref 0–45)
BILIRUB SERPL-MCNC: 0.3 MG/DL (ref 0.2–1.3)
BUN SERPL-MCNC: 23 MG/DL (ref 7–30)
CALCIUM SERPL-MCNC: 9.9 MG/DL (ref 8.5–10.1)
CHLORIDE SERPL-SCNC: 103 MMOL/L (ref 94–109)
CO2 SERPL-SCNC: 27 MMOL/L (ref 20–32)
CREAT SERPL-MCNC: 0.98 MG/DL (ref 0.52–1.04)
GFR SERPL CREATININE-BSD FRML MDRD: 58 ML/MIN/1.7M2
GLUCOSE SERPL-MCNC: 220 MG/DL (ref 70–99)
HBA1C MFR BLD: 8.8 % (ref 4.3–6)
POTASSIUM SERPL-SCNC: 4.6 MMOL/L (ref 3.4–5.3)
PROT SERPL-MCNC: 8.1 G/DL (ref 6.8–8.8)
SODIUM SERPL-SCNC: 138 MMOL/L (ref 133–144)

## 2017-10-23 PROCEDURE — 99215 OFFICE O/P EST HI 40 MIN: CPT | Performed by: NURSE PRACTITIONER

## 2017-10-23 PROCEDURE — 80053 COMPREHEN METABOLIC PANEL: CPT | Performed by: NURSE PRACTITIONER

## 2017-10-23 PROCEDURE — 36415 COLL VENOUS BLD VENIPUNCTURE: CPT | Performed by: NURSE PRACTITIONER

## 2017-10-23 PROCEDURE — 82140 ASSAY OF AMMONIA: CPT | Performed by: NURSE PRACTITIONER

## 2017-10-23 PROCEDURE — 83036 HEMOGLOBIN GLYCOSYLATED A1C: CPT | Performed by: NURSE PRACTITIONER

## 2017-10-23 PROCEDURE — 90834 PSYTX W PT 45 MINUTES: CPT | Performed by: SOCIAL WORKER

## 2017-10-23 NOTE — PATIENT INSTRUCTIONS
-Increased Novolog Insulin dose.   -Flu vaccination today.   -Call clinic with any questions or concerns.

## 2017-10-23 NOTE — MR AVS SNAPSHOT
After Visit Summary   10/23/2017    Nena Tang    MRN: [id number]           Patient Information     Date Of Birth          1960        Visit Information        Provider Department      10/23/2017 1:30 PM Richardson Lopez, [facility]        Today's Diagnoses     Schizoaffective disorder, depressive type (H)    -  1    Moderate major depression (H)           Follow-ups after your visit        Your next 10 appointments already scheduled     Nov 03, 2017  9:30 AM CDT   Return Visit with Richardson Lopez [facility] ([facility])    [address]  [address]  [place] [address]   [phone]            Nov 03, 2017  9:30 AM CDT   Return Visit with ART Wilburn [facility] ([facility])    [address]  [address]  [facility] [address]   [phone]            Nov 15, 2017 10:15 AM CST   RETURN RETINA with Tiburcio Chacon MD   [facility] ([facility])    [address]  [address]  [place] [address]   [phone]              Who to contact     If you have questions or need follow up information about today's clinic visit or your schedule please contact [facility] directly at [phone].  Normal or non-critical lab and imaging results will be communicated to you by MyChart, letter or phone within 4 business days after the clinic has received the results. If you do not hear from us within 7 days, please contact the clinic through MyChart or phone. If you have a critical or abnormal lab result, we will notify you by phone as soon as possible.  Submit refill requests through [employer] or call your pharmacy and they will forward the refill request to us. Please allow 3 business days for your refill to be  completed.          Additional Information About Your Visit        [employer] Information     [employer] gives you secure access to your electronic health record. If you see a primary care provider, you can also send messages to your care team and make appointments. If you have questions, please call your primary care clinic.  If you do not have a primary care provider, please call [phone] and they will assist you.        Care EveryWhere ID     This is your Care EveryWhere ID. This could be used by other organizations to access your [place] medical records  [id number]        Your Vitals Were     Last Period                   01/06/2015            Blood Pressure from Last 3 Encounters:   10/23/17 90/56   09/07/17 116/74   09/04/17 138/65    Weight from Last 3 Encounters:   10/23/17 226 lb (102.5 kg)   09/07/17 223 lb (101.2 kg)   09/04/17 224 lb 6.4 oz (101.8 kg)              Today, you had the following     No orders found for display         Today's Medication Changes          These changes are accurate as of: 10/23/17 11:59 PM.  If you have any questions, ask your nurse or doctor.               These medicines have changed or have updated prescriptions.        Dose/Directions    insulin aspart 100 UNIT/ML injection   Commonly known as:  NovoLOG FLEXPEN   This may have changed:  how much to take   Used for:  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, with long-term current use of insulin, unspecified laterality (H)   Changed by:  Rowena Weber APRN CNP        Dose:  20 Units   Inject 20 Units Subcutaneous 3 times daily (with meals) Once daily, can add additional 5 units if BGs are >500mg/dL.   Quantity:  15 mL   Refills:  3       * order for DME   This may have changed:  Another medication with the same name was added. Make sure you understand how and when to take each.   Used for:  Falls frequently   Changed by:  Jud Real PA-C        Equipment  being ordered: Rolling walker   Quantity:  1 Device   Refills:  0       * order for DME   This may have changed:  Another medication with the same name was added. Make sure you understand how and when to take each.   Used for:  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, with long-term current use of insulin, unspecified laterality (H)   Changed by:  Kenzie Sheehan        Hold Novolog if meals are refused.   Quantity:  1 each   Refills:  0       * order for DME   This may have changed:  You were already taking a medication with the same name, and this prescription was added. Make sure you understand how and when to take each.   Used for:  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, with long-term current use of insulin, unspecified laterality (H)   Changed by:  Rowena Weber APRN CNP        Diabetic Shoes   Quantity:  2 each   Refills:  0       * Notice:  This list has 3 medication(s) that are the same as other medications prescribed for you. Read the directions carefully, and ask your doctor or other care provider to review them with you.         Where to get your medicines      These medications were sent to [address] [address]     Phone:  [phone]     insulin aspart 100 UNIT/ML injection         Some of these will need a paper prescription and others can be bought over the counter.  Ask your nurse if you have questions.     Bring a paper prescription for each of these medications     order for DME                Primary Care Provider Office Phone # Fax #    ART Wilburn [phone]       [address] [facility] [address]  [facility] [address]        [facility]        General    Medical (pt-stated)     Notes - Note created  7/7/2016  9:15 AM by Chelsea Pulido RN    Get blood sugars under  control    Improve medication compliance    As of today's date 7/7/2016 goal is met at 0 - 25%.   Goal Status:  Active          Equal Access to Services     EMAKIMBERLY HEMA : Hadii samira shane tonny Rios, luis sangeethachrissy, devante catalan, lorena marroquin limonica johnson laprudencefredi lakshmi. So [facility] [phone].    ATENCIÓN: Si habla español, tiene a trinh disposición servicios gratuitos de asistencia lingüística. Llame al [phone].    We comply with applicable federal civil rights laws and [place] laws. We do not discriminate on the basis of race, color, national origin, age, disability, sex, sexual orientation, or gender identity.            Thank you!     Thank you for choosing [facility] PRIMARY [facility]ARE  for your care. Our goal is always to provide you with excellent care. Hearing back from our patients is one way we can continue to improve our services. Please take a few minutes to complete the written survey that you may receive in the mail after your visit with us. Thank you!             Your Updated Medication List - Protect others around you: Learn how to safely use, store and throw away your medicines at www.disposemymeds.org.          This list is accurate as of: 10/23/17 11:59 PM.  Always use your most recent med list.                   Brand Name Dispense Instructions for use Diagnosis    ACETAMINOPHEN PO      Take 1,000 mg by mouth every 6 hours as needed for pain or fever        albuterol 108 (90 BASE) MCG/ACT Inhaler    PROAIR HFA/PROVENTIL HFA/VENTOLIN HFA    3 Inhaler    Inhale 2 puffs into the lungs every 6 hours as needed for shortness of breath / dyspnea or wheezing    Dyspnea on exertion       aspirin 81 MG EC tablet    ASPIRIN LOW DOSE    100 tablet    Take 1 tablet (81 mg) by mouth daily    Coronary artery disease involving native heart with angina pectoris, unspecified vessel or lesion type (H)       atorvastatin 80 MG tablet    LIPITOR    28 tablet    TAKE 1 TABLET (80MG) BY  MOUTH DAILY    Hyperlipidemia LDL goal <100       BENZTROPINE MESYLATE PO      Take 1 mg by mouth 2 times daily        blood glucose monitoring lancets     150 each    Use to test blood sugar 2 times daily or as directed.  Ok to substitute alternative if insurance prefers.    Type 2 diabetes mellitus with diabetic neuropathy, with long-term current use of insulin (H)       blood glucose monitoring meter device kit     1 kit    Use to test blood sugar 2 times daily or as directed.  Ok to substitute alternative if insurance prefers.    Type 2 diabetes mellitus with diabetic neuropathy, with long-term current use of insulin (H)       chlorthalidone 25 MG tablet    HYGROTON    30 tablet    Take 1 tablet (25 mg) by mouth daily    HTN, goal below 140/90       CITALOPRAM HYDROBROMIDE PO      Take 15 mg by mouth daily        gabapentin 300 MG capsule    NEURONTIN    168 capsule    TAKE 2 CAPSULES (600MG) BY MOUTH THREE TIMES A DAY    Type 2 diabetes mellitus with diabetic neuropathy, with long-term current use of insulin (H)       glucose 4 G Chew chewable tablet     20 tablet    Take 2 every 15 minutes for blood sugar <70mg/dL. Recheck blood sugar every 15 minutes until above 70mg/dL, then eat a substantial meal.    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, with long-term current use of insulin, unspecified laterality (H)       HALOPERIDOL PO      Take 5 mg by mouth At Bedtime        ibuprofen 600 MG tablet    ADVIL/MOTRIN    60 tablet    Take 1 tablet (600 mg) by mouth every 4 hours as needed for moderate pain    Closed fracture of one rib of right side, initial encounter       insulin aspart 100 UNIT/ML injection    NovoLOG FLEXPEN    15 mL    Inject 20 Units Subcutaneous 3 times daily (with meals) Once daily, can add additional 5 units if BGs are >500mg/dL.    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, with long-term current use of  insulin, unspecified laterality (H)       insulin glargine 100 UNIT/ML injection    LANTUS    3 mL    Inject 45 Units Subcutaneous At Bedtime    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, with long-term current use of insulin, unspecified laterality (H)       losartan 100 MG tablet    COZAAR    28 tablet    TAKE 1 TABLET (100MG) BY MOUTH DAILY    Coronary artery disease involving native heart with angina pectoris, unspecified vessel or lesion type (H)       melatonin 5 MG Caps     90 capsule    Take 1 capsule by mouth daily At dinnertime    Insomnia, unspecified type       metoprolol 100 MG 24 hr tablet    TOPROL-XL    28 tablet    TAKE 1 TABLET (100MG) BY MOUTH DAILY    Coronary artery disease involving native heart with angina pectoris, unspecified vessel or lesion type (H)       * ONE TOUCH ULTRA test strip   Generic drug:  blood glucose monitoring     150 strip    TEST TWICE DAILY AS DIRECTED    Type 2 diabetes mellitus with diabetic neuropathy, with long-term current use of insulin (H)       * blood glucose monitoring test strip    ONE TOUCH VERIO IQ    150 strip    Use to test blood sugar 2 times daily or as directed.  Ok to substitute alternative if insurance prefers.    Type 2 diabetes mellitus with diabetic neuropathy, with long-term current use of insulin (H)       * order for DME     1 Device    Equipment being ordered: Rolling walker    Falls frequently       * order for DME     1 each    Hold Novolog if meals are refused.    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, with long-term current use of insulin, unspecified laterality (H)       polyethylene glycol powder    MIRALAX/GLYCOLAX    527 g    TAKE 17 GRAMS (1  CAPFUL) BY MOUTH DAILY    Constipation, unspecified constipation type       ranitidine 300 MG tablet    ZANTAC    28 tablet    TAKE 1 TABLET (300MG) BY MOUTH AT BEDTIME    Gastroesophageal reflux disease without esophagitis       senna-docusate 8.6-50 MG per tablet    SENOKOT-S;PERICOLACE     Take 1 tablet by mouth 2 times daily as needed for constipation        TRULICITY 1.5 MG/0.5ML pen   Generic drug:  dulaglutide     2 mL    INJECT 1.5MG SUBCUTANEOUSLY EVERY SEVEN DAYS    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, with long-term current use of insulin, unspecified laterality (H)       ULTICARE MINI 31G X 6 MM   Generic drug:  insulin pen needle     100 each    USE 4 DAILY OR AS DIRECTED    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, with long-term current use of insulin, unspecified laterality (H)       vitamin D3 03131 UNITS capsule    CHOLECALCIFEROL    4 capsule    TAKE 1 CAPSULE (50,000 UNITS) BY MOUTH ONCE A WEEK    Vitamin D deficiency       * Notice:  This list has 5 medication(s) that are the same as other medications prescribed for you. Read the directions carefully, and ask your doctor or other care provider to review them with you.

## 2017-10-23 NOTE — PROGRESS NOTES
SUBJECTIVE:                                                    Nena Tang is a 57 year old  female who presents to clinic today for the following health issues:  [facility]: Richardson Lopez & Hussein Diana (Intern).    Diabetes Follow-up  Patient states that she has been visitng a podiatrist but needs a physician order for diabetic shoes.  Patient states that she needs the order to be faxed over to [phone].   Patient reports a blood sugar of 500 last night with a baseline of 200's most recently. Recorded blood sugars ranging 161-290  Patient is checking blood sugars: two times daily.   Blood sugar testing frequency justification: uncontrolled diabetes  Results are as follows:       am - 290            postprandial after supper- 497      Diabetic concerns: None and blood sugar frequently over 200     Symptoms of hypoglycemia (low blood sugar): none     Paresthesias (numbness or burning in feet) or sores: No     Date of last diabetic eye exam: 8/2/2017    Hyperlipidemia Follow-Up      Rate your low fat/cholesterol diet?: not monitoring fat    Taking statin?  Yes, Atorvastatin 80 mg. Has been having muscle issues, being weak.    Other lipid medications/supplements?:  none    Hypertension Follow-up      Outpatient blood pressures are not being checked.  Low Salt Diet: not monitoring salt  BP Readings from Last 3 Encounters:   10/23/17 90/56   09/07/17 116/74   09/04/17 138/65       Mental Health Follow up Depression         Caregiver (Alva) concerned that patient is having some cognitive issues. Reports some forgetfulness and constant reminders to patient on things that she needs to get done like: using her CPAP at night, dosing her insulin-concern for remembering her required dose when away from home.         Patient reports that she has been having daily activities scheduled for interactions with other people away from the home. Patient reporting mood improvement with meeting new people and creating  things like painting pictures. Patient states that this has improved her daily routine and she is more active than her usual.        Patient states that she has been hearing voices at night when it is dark in her room. Patient states that the voices go away when the light is on. Reports that this is the month her  death and she always feels down.         Patient is having thoughts to harm self, states that she has been thinking about cutting herself.  States that she would like to harm herself but not to kill herself.       Status since last visit: patient having thoughts of harming self. October is a tough month for patient with the anniversary of her 's death.     See PHQ-9 for current symptoms.  Other associated symptoms: None    Complicating factors: as noted.   Significant life event:  No   Current substance abuse:  None  Anxiety or Panic symptoms:  No    Sleep - tough falling asleep when it is dark, patient has a night light that she keeps on at night. Nocturia x 1, when she has her CPAP on. Patient has not been using it nightly as recommended, although she reports that she is well rested and able to focus during the day on the nights that she uses her CPAP.   Appetite - good.   Exercise - walking     Smoking - no  Alcohol - no  Street drugs - no  Marijuana - no  Caffeine - yes- soda    PHQ-9  English PHQ-9   Any Language             Problems taking medications regularly: No    Medication side effects: none    Diet: regular (no restrictions)  Social History   Substance Use Topics     Smoking status: Never Smoker     Smokeless tobacco: Never Used     Alcohol use No      Comment: last month        Problem list and histories reviewed & adjusted, as indicated.  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)     Illiterate     Irritable bowel syndrome     overweight - BMI >35     Takotsubo cardiomyopathy     CAD (coronary artery disease)     Restless legs syndrome (RLS)     CINDY (obstructive sleep apnea)- mild AHI 10.3     Verbal auditory hallucination     Chronic low back pain     Schizoaffective disorder, depressive type (H)     Migraine headache     HTN, goal below 140/90     Health Care Home     Lumbago     Cervicalgia     Cocaine abuse, episodic     Suicidal ideation     Esophageal reflux     Mild nonproliferative diabetic retinopathy (H)     Tardive dyskinesia     Alcohol use     Left cataract     Falls frequently     History of uterine cancer     Depression     Psychophysiological insomnia     Dysuria     Asymptomatic postmenopausal status     Abdominal pain, right lower quadrant     Sepsis (H)     Pneumonia of right lower lobe due to infectious organism (H)     Infectious encephalopathy     Non-intractable vomiting with nausea     Acute respiratory failure with hypoxia (H)     Past Surgical History:   Procedure Laterality Date     C OOPHORECTORMY FOR RAHEL, W/BX  1983    UTERINE     CATARACT IOL, RT/LT Bilateral 2017     COLONOSCOPY N/A 3/16/2017    Procedure: COLONOSCOPY;  Surgeon: Traci Gonzalez MD;  Location:  GI     Coronary CTA  5/21/2014     HYSTERECTOMY  1983    uterine cancer yearly pap's per provider.     LAPAROSCOPIC CHOLECYSTECTOMY  2008     PHACOEMULSIFICATION CLEAR CORNEA WITH STANDARD INTRAOCULAR LENS IMPLANT Left 5/5/2017    Procedure: PHACOEMULSIFICATION CLEAR CORNEA WITH STANDARD INTRAOCULAR LENS IMPLANT;  LEFT EYE PHACOEMULSIFICATION CLEAR CORNEA WITH STANDARD INTRAOCULAR LENS IMPLANT ;  Surgeon: Tyra Diaz MD;  Location[facility]     PHACOEMULSIFICATION CLEAR CORNEA WITH STANDARD INTRAOCULAR LENS IMPLANT Right 6/30/2017    Procedure: PHACOEMULSIFICATION CLEAR CORNEA WITH STANDARD INTRAOCULAR LENS IMPLANT;  RIGHT  EYE PHACOEMULSIFICATION CLEAR CORNEA WITH STANDARD INTRAOCULAR LENS IMPLANT;  Surgeon: Tyra Diaz MD;  Location:  [facility]     RELEASE TRIGGER FINGER  10/11/2012    Left thumb. Procedure: RELEASE TRIGGER FINGER;  LEFT THUMB TRIGGER RELEASE;  Surgeon: Tay Langley MD;  Location:  SD     RELEASE TRIGGER FINGER Right 2016    Procedure: RELEASE TRIGGER FINGER;  Surgeon: Albino Castañeda MD;  Location:  OR       Social History   Substance Use Topics     Smoking status: Never Smoker     Smokeless tobacco: Never Used     Alcohol use No      Comment: last month     Family History   Problem Relation Age of Onset     CANCER Mother      BLADDER     Respiratory Mother      COPD     GASTROINTESTINAL DISEASE Mother      CIRRHOSIS OF LI BOLIVAR     Alcohol/Drug Mother      DIABETES Mother      Hypertension Mother      Lipids Mother      C.A.D. Mother      Glaucoma Mother      Alcohol/Drug Sister      MENTAL ILLNESS Sister      Alcohol/Drug Sister      Psychotic Disorder Sister      CANCER Maternal Grandmother      UNKNOWN TYPE     CANCER Brother      COLON     Cancer - colorectal Brother      IN HIS LATE 30S     Alcohol/Drug Brother       OF HEROIN OVERDOSE AT AGE 22 YRS     Macular Degeneration No family hx of            Current Outpatient Prescriptions   Medication Sig Dispense Refill     losartan (COZAAR) 100 MG tablet TAKE 1 TABLET (100MG) BY MOUTH DAILY 28 tablet 3     chlorthalidone (HYGROTON) 25 MG tablet Take 1 tablet (25 mg) by mouth daily 30 tablet 3     gabapentin (NEURONTIN) 300 MG capsule TAKE 2 CAPSULES (600MG) BY MOUTH THREE TIMES A  capsule 3     insulin aspart (NOVOLOG FLEXPEN) 100 UNIT/ML injection Inject 18 Units Subcutaneous 3 times daily (with meals) Once daily, can add additional 5 units if BGs are >500mg/dL. 15 mL 1     insulin glargine (LANTUS) 100 UNIT/ML injection Inject 45 Units Subcutaneous At Bedtime 3 mL 3     blood glucose monitoring (ONETOUCH VERIO IQ SYSTEM) meter  device kit Use to test blood sugar 2 times daily or as directed.  Ok to substitute alternative if insurance prefers. 1 kit 0     blood glucose monitoring (ONE TOUCH VERIO IQ) test strip Use to test blood sugar 2 times daily or as directed.  Ok to substitute alternative if insurance prefers. 150 strip 11     blood glucose monitoring (ONE TOUCH DELICA) lancets Use to test blood sugar 2 times daily or as directed.  Ok to substitute alternative if insurance prefers. 150 each 11     vitamin D3 (CHOLECALCIFEROL) 73265 UNITS capsule TAKE 1 CAPSULE (50,000 UNITS) BY MOUTH ONCE A WEEK 4 capsule 3     albuterol (PROAIR HFA/PROVENTIL HFA/VENTOLIN HFA) 108 (90 BASE) MCG/ACT Inhaler Inhale 2 puffs into the lungs every 6 hours as needed for shortness of breath / dyspnea or wheezing 3 Inhaler 1     BENZTROPINE MESYLATE PO Take 1 mg by mouth 2 times daily       CITALOPRAM HYDROBROMIDE PO Take 15 mg by mouth daily       senna-docusate (SENOKOT-S;PERICOLACE) 8.6-50 MG per tablet Take 1 tablet by mouth 2 times daily as needed for constipation       HALOPERIDOL PO Take 5 mg by mouth At Bedtime       ACETAMINOPHEN PO Take 1,000 mg by mouth every 6 hours as needed for pain or fever       atorvastatin (LIPITOR) 80 MG tablet TAKE 1 TABLET (80MG) BY MOUTH DAILY 28 tablet 11     metoprolol (TOPROL-XL) 100 MG 24 hr tablet TAKE 1 TABLET (100MG) BY MOUTH DAILY 28 tablet 11     polyethylene glycol (MIRALAX/GLYCOLAX) powder TAKE 17 GRAMS (1 CAPFUL) BY MOUTH DAILY 527 g 3     ranitidine (ZANTAC) 300 MG tablet TAKE 1 TABLET (300MG) BY MOUTH AT BEDTIME 28 tablet 3     ONE TOUCH ULTRA test strip TEST TWICE DAILY AS DIRECTED 150 strip 8     TRULICITY 1.5 MG/0.5ML pen INJECT 1.5MG SUBCUTANEOUSLY EVERY SEVEN DAYS 2 mL 11     ULTICARE MINI 31G X 6 MM insulin pen needle USE 4 DAILY OR AS DIRECTED 100 each 11     aspirin (ASPIRIN LOW DOSE) 81 MG EC tablet Take 1 tablet (81 mg) by mouth daily 100 tablet 4     ibuprofen (ADVIL,MOTRIN) 600 MG tablet Take 1  tablet (600 mg) by mouth every 4 hours as needed for moderate pain 60 tablet 0     melatonin 5 MG CAPS Take 1 capsule by mouth daily At dinnertime 90 capsule 1     glucose 4 G CHEW Take 2 every 15 minutes for blood sugar <70mg/dL. Recheck blood sugar every 15 minutes until above 70mg/dL, then eat a substantial meal. 20 tablet 1     order for DME Hold Novolog if meals are refused. 1 each 0     order for DME Equipment being ordered: Rolling walker 1 Device 0     Allergies   Allergen Reactions     Imidazole Antifungals Hives     Tolerates diflucan     Ketoprofen Itching     Pruritis to topical     Lisinopril Hives     Metformin Other (See Comments)     Patient hospitalized for lactic acidosis - admitting provider suspectd caused by metformin     Metronidazole Hives     Posaconazole Hives     Tolerates diflucan     Recent Labs   Lab Test  09/04/17   0715  09/03/17   0705  09/02/17   0705   08/30/17   0738  08/29/17   1325   06/27/17   1358  03/28/17   1114   12/29/16   0909   10/25/16   0815   07/13/16   1350   07/14/15   1215   09/03/13   1156   A1C   --    --    --    --   7.6*   --    --   7.0*  7.2*   < >   --    < >   --    < >   --    < >  9.1*   < >  10.8*   LDL   --    --    --    --    --    --    --   64   --    --    --    --    --    --   137*   --   78   < >  90   HDL   --    --    --    --    --    --    --   37*   --    --    --    --    --    --    --    --   39*   --   37*   TRIG   --    --    --    --    --    --    --   267*   --    --    --    --    --    --    --    --   151*   --   171*   ALT   --    --    --    --   34  45   --   32   --    < >  26   < >  15   < >   --    < >   --    < >  38   CR  0.78  0.89  0.83   < >  0.88  0.90   < >  0.78   --    < >  0.72   < >  0.62   < >   --    < >  0.67   < >  0.54   GFRESTIMATED  76  65  71   < >  66  65   < >  76   --    < >  83   < >  >90  Non  GFR Calc     < >   --    < >  >90  Non  GFR Calc     < >  >90   GFRESTBLACK   >90  79  86   < >  80  78   < >  >90   GFR Calc     --    < >  >90   GFR Calc     < >  >90   GFR Calc     < >   --    < >  >90   GFR Calc     < >  >90   POTASSIUM   --   3.9  4.4   < >  4.0  4.1   < >  4.3   --    < >  4.2   < >  3.8   < >   --    < >  4.3   < >  4.4   TSH   --    --    --    --    --    --    --    --    --    --   2.24   --   0.93   --    --    < >   --    < >  1.42    < > = values in this interval not displayed.      BP Readings from Last 3 Encounters:   09/07/17 116/74   09/04/17 138/65   08/22/17 110/58    Wt Readings from Last 3 Encounters:   09/07/17 223 lb (101.2 kg)   09/04/17 224 lb 6.4 oz (101.8 kg)   08/22/17 229 lb 8 oz (104.1 kg)        Labs reviewed in EPIC  Problem list, Medication list, Allergies, and Medical/Social/Surgical histories reviewed in [facility] and updated as appropriate.     ROS: Constitutional, neuro, ENT, endocrine, pulmonary, cardiac, gastrointestinal, genitourinary, musculoskeletal, integument and psychiatric systems are negative, except as otherwise noted above in the HPI.   OBJECTIVE:                                                    BP 90/56  Pulse 80  Temp 98.1  F (36.7  C) (Oral)  Resp 16  Wt 226 lb (102.5 kg)  LMP 01/06/2015  SpO2 95%  BMI 42.7 kg/m2  Body mass index is 42.7 kg/(m^2).  GENERAL: obese, alert, well nourished, well hydrated, no distress  EYES: Eyes grossly normal to inspection, extraocular movements - intact, and PERRL  RESP: no shortness of breath.  MS: extremities- no gross deformities noted, no edema  SKIN: no suspicious lesions, no rashes, dry and flaky.  NEURO: strength and tone- normal, sensory exam- grossly normal, mentation- intact, speech- normal, reflexes- symmetric  Non focal no aphasia. No facial asymmetry.     Mental Status Assessment:  Appearance:   Appropriate   Eye Contact:   Fair   Psychomotor Behavior: Normal   Attitude:   Cooperative    Orientation:   All  Speech   Rate / Production: Normal    Volume:  Normal   Mood:    Depressed  Sad   Affect:    Appropriate   Thought Content:  Clear   Thought Form:  Coherent  Logical   Insight:    Fair   Attention Span and Concentration:  limited  Recent and Remote Memory:  intact  Fund of Knowledge: appropriate  Muscle Strength and Tone: normal   Suicidal Ideation: reports no thoughts, no intention  Hallucination: Yes  Paranoid-No  Manic-No  Panic-No  Self harm-Yes      See [facility] notes     Diagnostic Test Results:  Results for orders placed or performed during the hospital encounter of 10/16/17   XR Chest 2 Views    Narrative    CHEST TWO VIEWS  10/16/2017 8:40 AM     HISTORY: Lobar pneumonia, unspecified organism.     COMPARISON: Chest x-ray 8/29/2017.      Impression    IMPRESSION: Lungs are slightly hypoaerated but otherwise clear. Heart  remains enlarged. No pneumothorax or pleural effusions. Mild  degenerative spine changes are noted in the lower thoracic and upper  lumbar region where imaged.    KEILA HEALY MD     *Note: Due to a large number of results and/or encounters for the requested time period, some results have not been displayed. A complete set of results can be found in Results Review.      Labs pending.   ASSESSMENT/PLAN:                                                    (E11.3299,  Z79.4) 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, with long-term current use of insulin, unspecified laterality (H)  (primary encounter diagnosis)  Comment: as noted above.   Plan: order for DME, insulin aspart (NOVOLOG FLEXPEN)        100 UNIT/ML injection, Hemoglobin A1c        Refills done.     (R41.89) Cognitive changes  Comment: Forgetfulness in relation to metabolic encephalopathy vs medications vs other etiologies.   Plan: Comprehensive metabolic panel (BMP + Alb, Alk         Phos, ALT, AST, Total. Bili, TP), Ammonia,           (R45.89) Thoughts of self  harm  Comment: as noted above.   Plan: Patient will be reaching out to her caregiver or son if she continues to have these thoughts and is struggling to stay safe.   -Follow-up next week with PCP and [facility].       Patient Instructions   -Increased Novolog Insulin dose.   -Flu vaccination today.   -Call clinic with any questions or concerns.       I spent 25 min spent in direct face to face time with Nena Tang, greater than 50% in counseling and coordination of care for:  Self harm and diabetes.     ART Oliver [facility] PRIMARY [facility]

## 2017-10-23 NOTE — MR AVS SNAPSHOT
After Visit Summary   10/23/2017    Nena Tang    MRN: [id number]           Patient Information     Date Of Birth          1960        Visit Information        Provider Department      10/23/2017 1:30 PM Rowena Weber APRN CNP [facility]        Today's Diagnoses     Mild nonproliferative diabetic retinopathy of both eyes without macular edema associated with diabetes mellitus due to underlying condition (H)    -  1    Cognitive changes        Thoughts of self harm        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, with long-term current use of insulin, unspecified laterality (H)        Need for prophylactic vaccination and inoculation against influenza          Care Instructions    -Increased Novolog Insulin dose.   -Flu vaccination today.   -Call clinic with any questions or concerns.             Follow-ups after your visit        Your next 10 appointments already scheduled     Nov 15, 2017 10:15 AM CST   RETURN RETINA with Tiburcio Chacon MD   Eye Clinic ([facility])    [address]  [address]  [address] [address]  [facility] [address] [phone]              Who to contact     If you have questions or need follow up information about today's clinic visit or your schedule please contact [facility] directly at [phone].  Normal or non-critical lab and imaging results will be communicated to you by MyChart, letter or phone within 4 business days after the clinic has received the results. If you do not hear from us within 7 days, please contact the clinic through MyChart or phone. If you have a critical or abnormal lab result, we will notify you by phone as soon as possible.  Submit refill requests through [employer] or call your pharmacy and they will forward the refill request to us. Please allow 3 business days for your refill to be  completed.          Additional Information About Your Visit        [employer] Information     [employer] gives you secure access to your electronic health record. If you see a primary care provider, you can also send messages to your care team and make appointments. If you have questions, please call your primary care clinic.  If you do not have a primary care provider, please call [phone] and they will assist you.        Care EveryWhere ID     This is your Care EveryWhere ID. This could be used by other organizations to access your [facility] medical records  [id number]        Your Vitals Were     Pulse Temperature Respirations Last Period Pulse Oximetry BMI (Body Mass Index)    80 98.1  F (36.7  C) (Oral) 16 01/06/2015 95% 42.7 kg/m2       Blood Pressure from Last 3 Encounters:   10/23/17 90/56   09/07/17 116/74   09/04/17 138/65    Weight from Last 3 Encounters:   10/23/17 226 lb (102.5 kg)   09/07/17 223 lb (101.2 kg)   09/04/17 224 lb 6.4 oz (101.8 kg)              We Performed the Following     ADMIN INFLUENZA (For MEDICARE Patients ONLY) []     Ammonia     Comprehensive metabolic panel (BMP + Alb, Alk Phos, ALT, AST, Total. Bili, TP)     FLU VAC, SPLIT VIRUS IM > 3 YO (QUADRIVALENT) [24778]     Hemoglobin A1c          Today's Medication Changes          These changes are accurate as of: 10/23/17  2:45 PM.  If you have any questions, ask your nurse or doctor.               These medicines have changed or have updated prescriptions.        Dose/Directions    insulin aspart 100 UNIT/ML injection   Commonly known as:  NovoLOG FLEXPEN   This may have changed:  how much to take   Used for:  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, with long-term current use of insulin, unspecified laterality (H)   Changed by:  Rowena Weber APRN CNP        Dose:  20 Units   Inject 20 Units Subcutaneous 3 times daily (with meals) Once daily, can add additional 5 units if BGs  are >500mg/dL.   Quantity:  15 mL   Refills:  3       * order for DME   This may have changed:  Another medication with the same name was added. Make sure you understand how and when to take each.   Used for:  Falls frequently   Changed by:  Jud Real PA-C        Equipment being ordered: Rolling walker   Quantity:  1 Device   Refills:  0       * order for DME   This may have changed:  Another medication with the same name was added. Make sure you understand how and when to take each.   Used for:  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, with long-term current use of insulin, unspecified laterality (H)   Changed by:  Kenzie Sheehan        Hold Novolog if meals are refused.   Quantity:  1 each   Refills:  0       * order for DME   This may have changed:  You were already taking a medication with the same name, and this prescription was added. Make sure you understand how and when to take each.   Used for:  Mild nonproliferative diabetic retinopathy of both eyes without macular edema associated with diabetes mellitus due to underlying condition (H)   Changed by:  Rowena Weber APRN CNP        Diabetic Shoes   Quantity:  2 each   Refills:  0       * Notice:  This list has 3 medication(s) that are the same as other medications prescribed for you. Read the directions carefully, and ask your doctor or other care provider to review them with you.         Where to get your medicines      These medications were sent to [address] [address]     Phone:  [phone]     insulin aspart 100 UNIT/ML injection         Some of these will need a paper prescription and others can be bought over the counter.  Ask your nurse if you have questions.     Bring a paper prescription for each of these medications     order for DME                Primary Care Provider  Office Phone # Fax #    ART Wilburn [phone]       [address] [facility] [address]  [facility] [address]        Goals        General    Medical (pt-stated)     Notes - Note created  7/7/2016  9:15 AM by Chelsea Pulido RN    Get blood sugars under control    Improve medication compliance    As of today's date 7/7/2016 goal is met at 0 - 25%.   Goal Status:  Active          Equal Access to Services     RAMESH GARRIDO : Hadii aad ku hadasho Soomaali, waaxda luqadaha, qaybta kaalmada adeegyada, waxay idiin hayaan adeeg kharash laherrera . So [facility] [phone].    ATENCIÓN: Si habla español, tiene a trinh disposición servicios gratuitos de asistencia lingüística. Llame al [phone].    We comply with applicable federal civil rights laws and [place] laws. We do not discriminate on the basis of race, color, national origin, age, disability, sex, sexual orientation, or gender identity.            Thank you!     Thank you for choosing [facility]  for your care. Our goal is always to provide you with excellent care. Hearing back from our patients is one way we can continue to improve our services. Please take a few minutes to complete the written survey that you may receive in the mail after your visit with us. Thank you!             Your Updated Medication List - Protect others around you: Learn how to safely use, store and throw away your medicines at www.disposemymeds.org.          This list is accurate as of: 10/23/17  2:45 PM.  Always use your most recent med list.                   Brand Name Dispense Instructions for use Diagnosis    ACETAMINOPHEN PO      Take 1,000 mg by mouth every 6 hours as needed for pain or fever        albuterol 108 (90 BASE) MCG/ACT Inhaler    PROAIR HFA/PROVENTIL HFA/VENTOLIN HFA    3 Inhaler    Inhale 2 puffs into the lungs every 6 hours as needed for shortness of breath / dyspnea or wheezing    Dyspnea on exertion       aspirin 81  MG EC tablet    ASPIRIN LOW DOSE    100 tablet    Take 1 tablet (81 mg) by mouth daily    Coronary artery disease involving native heart with angina pectoris, unspecified vessel or lesion type (H)       atorvastatin 80 MG tablet    LIPITOR    28 tablet    TAKE 1 TABLET (80MG) BY MOUTH DAILY    Hyperlipidemia LDL goal <100       BENZTROPINE MESYLATE PO      Take 1 mg by mouth 2 times daily        blood glucose monitoring lancets     150 each    Use to test blood sugar 2 times daily or as directed.  Ok to substitute alternative if insurance prefers.    Type 2 diabetes mellitus with diabetic neuropathy, with long-term current use of insulin (H)       blood glucose monitoring meter device kit     1 kit    Use to test blood sugar 2 times daily or as directed.  Ok to substitute alternative if insurance prefers.    Type 2 diabetes mellitus with diabetic neuropathy, with long-term current use of insulin (H)       chlorthalidone 25 MG tablet    HYGROTON    30 tablet    Take 1 tablet (25 mg) by mouth daily    HTN, goal below 140/90       CITALOPRAM HYDROBROMIDE PO      Take 15 mg by mouth daily        gabapentin 300 MG capsule    NEURONTIN    168 capsule    TAKE 2 CAPSULES (600MG) BY MOUTH THREE TIMES A DAY    Type 2 diabetes mellitus with diabetic neuropathy, with long-term current use of insulin (H)       glucose 4 G Chew chewable tablet     20 tablet    Take 2 every 15 minutes for blood sugar <70mg/dL. Recheck blood sugar every 15 minutes until above 70mg/dL, then eat a substantial meal.    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, with long-term current use of insulin, unspecified laterality (H)       HALOPERIDOL PO      Take 5 mg by mouth At Bedtime        ibuprofen 600 MG tablet    ADVIL/MOTRIN    60 tablet    Take 1 tablet (600 mg) by mouth every 4 hours as needed for moderate pain    Closed fracture of one rib of right side, initial encounter       insulin aspart 100  UNIT/ML injection    NovoLOG FLEXPEN    15 mL    Inject 20 Units Subcutaneous 3 times daily (with meals) Once daily, can add additional 5 units if BGs are >500mg/dL.    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, with long-term current use of insulin, unspecified laterality (H)       insulin glargine 100 UNIT/ML injection    LANTUS    3 mL    Inject 45 Units Subcutaneous At Bedtime    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, with long-term current use of insulin, unspecified laterality (H)       losartan 100 MG tablet    COZAAR    28 tablet    TAKE 1 TABLET (100MG) BY MOUTH DAILY    Coronary artery disease involving native heart with angina pectoris, unspecified vessel or lesion type (H)       melatonin 5 MG Caps     90 capsule    Take 1 capsule by mouth daily At dinnertime    Insomnia, unspecified type       metoprolol 100 MG 24 hr tablet    TOPROL-XL    28 tablet    TAKE 1 TABLET (100MG) BY MOUTH DAILY    Coronary artery disease involving native heart with angina pectoris, unspecified vessel or lesion type (H)       * ONE TOUCH ULTRA test strip   Generic drug:  blood glucose monitoring     150 strip    TEST TWICE DAILY AS DIRECTED    Type 2 diabetes mellitus with diabetic neuropathy, with long-term current use of insulin (H)       * blood glucose monitoring test strip    ONE TOUCH VERIO IQ    150 strip    Use to test blood sugar 2 times daily or as directed.  Ok to substitute alternative if insurance prefers.    Type 2 diabetes mellitus with diabetic neuropathy, with long-term current use of insulin (H)       * order for DME     1 Device    Equipment being ordered: Rolling walker    Falls frequently       * order for DME     1 each    Hold Novolog if meals are refused.    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, with long-term current use of insulin, unspecified laterality (H)        * order for DME     2 each    Diabetic Shoes    Mild nonproliferative diabetic retinopathy of both eyes without macular edema associated with diabetes mellitus due to underlying condition (H)       polyethylene glycol powder    MIRALAX/GLYCOLAX    527 g    TAKE 17 GRAMS (1 CAPFUL) BY MOUTH DAILY    Constipation, unspecified constipation type       ranitidine 300 MG tablet    ZANTAC    28 tablet    TAKE 1 TABLET (300MG) BY MOUTH AT BEDTIME    Gastroesophageal reflux disease without esophagitis       senna-docusate 8.6-50 MG per tablet    SENOKOT-S;PERICOLACE     Take 1 tablet by mouth 2 times daily as needed for constipation        TRULICITY 1.5 MG/0.5ML pen   Generic drug:  dulaglutide     2 mL    INJECT 1.5MG SUBCUTANEOUSLY EVERY SEVEN DAYS    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, with long-term current use of insulin, unspecified laterality (H)       ULTICARE MINI 31G X 6 MM   Generic drug:  insulin pen needle     100 each    USE 4 DAILY OR AS DIRECTED    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, with long-term current use of insulin, unspecified laterality (H)       vitamin D3 29728 UNITS capsule    CHOLECALCIFEROL    4 capsule    TAKE 1 CAPSULE (50,000 UNITS) BY MOUTH ONCE A WEEK    Vitamin D deficiency       * Notice:  This list has 5 medication(s) that are the same as other medications prescribed for you. Read the directions carefully, and ask your doctor or other care provider to review them with you.

## 2017-10-23 NOTE — NURSING NOTE
"Chief Complaint   Patient presents with     RECHECK       Initial BP 90/56  Pulse 80  Temp 98.1  F (36.7  C) (Oral)  Resp 16  Wt 226 lb (102.5 kg)  LMP 01/06/2015  SpO2 95%  BMI 42.7 kg/m2 Estimated body mass index is 42.7 kg/(m^2) as calculated from the following:    Height as of 9/7/17: 5' 1\" (1.549 m).    Weight as of this encounter: 226 lb (102.5 kg).  Medication Reconciliation: complete     Adam Escamilla, TRACEY    "

## 2017-10-24 NOTE — PROGRESS NOTES
[facility]   October 23, 2017      Behavioral Health Clinician Progress Note    Patient Name: Nena Tang           Service Type: Individual      Service Location:   Face to Face in Clinic     Session Start Time: 1:50pm  Session End Time: 2:33pm      Session Length: 38 - 52      Attendees: Client and caregiver    Visit Activities (Refresh list every visit): [facility] [employer]    Diagnostic Assessment Date: Not completed  Treatment Plan Review Date: Not completed  See Flowsheets for today's PHQ-9 and TAMIA-7 results  Previous PHQ-9:   PHQ-9 SCORE 12/20/2016 1/2/2017 2/3/2017   Total Score - - -   Total Score - - -   Total Score 7 0 0     Previous TAMIA-7:   TAMIA-7 SCORE 12/20/2016 1/2/2017 2/3/2017   Total Score - - -   Total Score 6 0 0   Total Score - - -       ALEXANDRA LEVEL:  ALEXANDRA Score (Last Two) 8/30/2011 6/9/2014   ALEXANDRA Raw Score 42 37   Activation Score 66 49.9   ALEXANDRA Level 3 2       DATA  Extended Session (60+ minutes): No  Interactive Complexity: No  Crisis: No    Treatment Objective(s) Addressed in This Session:  Target Behavior(s): disease management/lifestyle changes Mental Health    Depressed Mood: Increase interest, engagement, and pleasure in doing things  Decrease frequency and intensity of feeling down, depressed, hopeless  Improve quantity and quality of night time sleep / decrease daytime naps  Improve diet, appetite, mindful eating, and / or meal planning  Identify negative self-talk and behaviors: challenge core beliefs, myths, and actions  Improve concentration, focus, and mindfulness in daily activities   Decrease thoughts that you'd be better off dead or of suicide / self-harm  Anxiety: will experience a reduction in anxiety, will develop more effective coping skills to manage anxiety symptoms, will develop healthy cognitive patterns and beliefs and will increase ability to function adaptively  Risk / Safety: will develop strategies for more effective management of risk  issues and will follow safety plan (in EMR) for more effective management of risk issues  Grief / Loss: will engage in effective approach to address and resolve grief/loss issues and will process grief/loss issues in an adaptive manner  Alcohol / Substance Use: continue to make healthy choices regarding substance use and engage in activities / supportive services that promote sobriety  Thought Disturbance: will develop skills to more effectively manage symptoms, will develop more effective support systems and will continue to take medications as prescribed    Current Stressors / Issues:    The patient has been having some memory issues of late according to the patient and her placement staff member-in addition she has been having some difficulty remembering things in order to manager her diabetes-she has been having higher blood sugar readings-in addition the patient has been forgetting to put her CPAP machine on when sleeping at times so the house staff members have been checking on her when she falls asleep to make sure she puts machine on before bed-she reported this month being the anniversary of the death of her  which happen years ago-she is grieving his death at this time and reported to have a hard time with mental health and grieving during the month of October-usually the patient's staff reported that she is positive and supportive to other residents but that more recently (in the month of October) the patient has been experiencing more low mood-she has been having thoughts to self harm (not die)-she reported having ideas of how to hurt the self-she reported no current thoughts to hurt the self and she contracted that she would tell someone if she was having imminent thoughts to hurt the self-he placement staff was told to contact crisis line they contract with if the patient tells them she is at risk of harming self.  The patient reported that she has been hearing voices when she is in the dark and  as a result she makes sure that her night light is always on as when the lights are on she does not hear voices.      Progress on Treatment Objective(s) / Homework:  Worsening - PREPARATION (Decided to change - considering how); Intervened by negotiating a change plan and determining options / strategies for behavior change, identifying triggers, exploring social supports, and working towards setting a date to begin behavior change    Motivational Interviewing    MI Intervention: Expressed Empathy/Understanding, Supported Autonomy, Collaboration, Evocation, Permission to raise concern or advise, Open-ended questions and Reflections: simple and complex     Change Talk Expressed by the Patient: Desire to change Reasons to change Need to change Committment to change Activation Taking steps    Provider Response to Change Talk: E - Evoked more info from patient about behavior change, A - Affirmed patient's thoughts, decisions, or attempts at behavior change, R - Reflected patient's change talk and S - Summarized patient's change talk statements    Also provided psychoeducation about behavioral health condition, symptoms, and treatment options    Care Plan review completed: No    Medication Review:  See medication list    Medication Compliance:  NA    Changes in Health Issues:   Yes: please refer to PCP note on same day and same time    Chemical Use Review:   Substance Use: Chemical use reviewed, no active concerns identified      Tobacco Use: No current tobacco use.      Assessment: Current Emotional / Mental Status (status of significant symptoms):  Risk status (Self / Other harm or suicidal ideation)  Patient has had a history of suicidal ideation: yes in the past  Patient denies current fears or concerns for personal safety.  Patient denies current or recent suicidal ideation or behaviors.  Patient denies current or recent homicidal ideation or behaviors.  Patient reports current or recent self injurious behavior or  ideation including no current thoughts and she was able to contract for safety.  Patient denies other safety concerns.  A safety and risk management plan has not been developed at this time, however patient was encouraged to call [place] [phone] should there be a change in any of these risk factors.    Appearance:   Appropriate   Eye Contact:   Good   Psychomotor Behavior: Normal   Attitude:   Cooperative   Orientation:   All  Speech   Rate / Production: Normal    Volume:  Normal   Mood:    Normal  Affect:    Appropriate  Bright   Thought Content:  Clear   Thought Form:  Coherent  Logical   Insight:    Fair     Diagnoses:  1. Schizoaffective disorder, depressive type (H)    2. Moderate major depression (H)        Collateral Reports Completed:  Not Applicable    Plan: (Homework, other):  Patient was given information about behavioral services and encouraged to schedule a follow up appointment with the clinic [facility] as needed.  She was also given information about mental health symptoms and treatment options .  CD Recommendations: Maintain Sobriety.      BIN George, [facility] February 3, 2017

## 2017-11-03 ENCOUNTER — OFFICE VISIT (OUTPATIENT)
Dept: BEHAVIORAL HEALTH | Facility: CLINIC | Age: 57
End: 2017-11-03
Payer: MEDICARE

## 2017-11-03 ENCOUNTER — OFFICE VISIT (OUTPATIENT)
Dept: FAMILY MEDICINE | Facility: CLINIC | Age: 57
End: 2017-11-03
Payer: MEDICARE

## 2017-11-03 VITALS
BODY MASS INDEX: 42.89 KG/M2 | HEART RATE: 85 BPM | SYSTOLIC BLOOD PRESSURE: 100 MMHG | DIASTOLIC BLOOD PRESSURE: 56 MMHG | OXYGEN SATURATION: 95 % | TEMPERATURE: 98.2 F | WEIGHT: 227 LBS | RESPIRATION RATE: 16 BRPM

## 2017-11-03 DIAGNOSIS — Z23 NEED FOR PROPHYLACTIC VACCINATION AND INOCULATION AGAINST INFLUENZA: Primary | ICD-10-CM

## 2017-11-03 DIAGNOSIS — F25.1 SCHIZOAFFECTIVE DISORDER, DEPRESSIVE TYPE (H): ICD-10-CM

## 2017-11-03 DIAGNOSIS — F32.1 MODERATE MAJOR DEPRESSION (H): Primary | ICD-10-CM

## 2017-11-03 DIAGNOSIS — R45.851 SUICIDAL IDEATION: ICD-10-CM

## 2017-11-03 DIAGNOSIS — R45.89 THOUGHTS OF SELF HARM: ICD-10-CM

## 2017-11-03 PROCEDURE — 90832 PSYTX W PT 30 MINUTES: CPT | Performed by: SOCIAL WORKER

## 2017-11-03 PROCEDURE — 99215 OFFICE O/P EST HI 40 MIN: CPT | Mod: 25 | Performed by: NURSE PRACTITIONER

## 2017-11-03 PROCEDURE — G0008 ADMIN INFLUENZA VIRUS VAC: HCPCS | Performed by: NURSE PRACTITIONER

## 2017-11-03 RX ORDER — HALOPERIDOL 5 MG/1
5 TABLET ORAL 2 TIMES DAILY PRN
Qty: 30 TABLET | Refills: 1 | Status: SHIPPED | OUTPATIENT
Start: 2017-11-03 | End: 2017-11-03

## 2017-11-03 RX ORDER — HALOPERIDOL 10 MG/1
10 TABLET ORAL AT BEDTIME
Qty: 30 TABLET | Refills: 1 | Status: SHIPPED | OUTPATIENT
Start: 2017-11-03 | End: 2017-11-03

## 2017-11-03 RX ORDER — HALOPERIDOL 5 MG/1
5 TABLET ORAL AT BEDTIME
Qty: 30 TABLET | Refills: 1 | Status: ON HOLD | OUTPATIENT
Start: 2017-11-03 | End: 2017-11-10

## 2017-11-03 RX ORDER — HALOPERIDOL 5 MG/1
5 TABLET ORAL EVERY 12 HOURS PRN
Qty: 30 TABLET | Refills: 1 | Status: ON HOLD | OUTPATIENT
Start: 2017-11-03 | End: 2017-11-10

## 2017-11-03 NOTE — PROGRESS NOTES
SUBJECTIVE:                                                    Nena Tang is a 57 year old  female with a one point cane who presents to clinic today for the following health issues:    Patient is accompanied by her care giver- Zheng.     Diabetes Follow-up  Patient states that she her blood sugars have been high. Patient states that she has been sneaking candy and that is why her blood sugars have been high. Patient states she went tricker treating on halloween night with her grand kid.    Patient is checking blood sugars: 3 times daily. Ranging 144-290  Blood sugar testing frequency justification: Uncontrolled diabetes  Results are as follows:       am - 275 (Today)       lunchtime - 170 (Yesterday)       suppertime - 172 (Yesterday)      Diabetic concerns: None and blood sugar frequently over 200     Symptoms of hypoglycemia (low blood sugar): shaky, dizzy, weak, at least once or every other day feels these symptoms; not related to low blood sugars.      Paresthesias (numbness or burning in feet) or sores: Yes, same.   Date of last diabetic eye exam: Needs glasses.    Mental Health Follow up: Anxiety / Depression  Patient states that her sister is feeling better so she is a bit better with that. States that her mood has been up and down. Patient states that she has been isolating more, and has more opportunities to hurt herself.    Patient states that she is suicidal and agitated. Patient is schedule to see her psychiatrist on the 13 th, for follow-up and medication adjustment. Patient would like to see a therapist today.   Patient states that she is tired of living. States that she wants to cut herself with scissors she has in her room. Patient states that she has been thinking about hurting herself but not to kill herself.  Patient states that she often has the feelings to hurt herself, but has not gotten the courage to do so.   Patient states that the man that comes to her room is getting  more powerful even though she keeps her bedside light on all the time. Patient reports that she is weaker and that is why the man is getting stronger and telling her to hurt herself with the scissors.      Status since last visit: increased thoughts of wanting to hurt herself.     See PHQ-9 for current symptoms.  Other associated symptoms: None    Complicating factors: as noted.   Significant life event:  No   Current substance abuse:  None  Anxiety or Panic symptoms:  No    Sleep - staying up late at night, using CPAP at night and feeling much better in the morning. Nocturia x2 , sometimes able to fall asleep right away.   Appetite - good, no concerns.   Exercise - staying more active.     Smoking - no  Alcohol - no  Street drugs - no  Marijuana - no  Caffeine - no    PHQ-9  English PHQ-9   Any Language               Problems taking medications regularly: No    Medication side effects: none    Diet: regular (no restrictions)  Social History   Substance Use Topics     Smoking status: Never Smoker     Smokeless tobacco: Never Used     Alcohol use No      Comment: last month        Problem list and histories reviewed & adjusted, as indicated.  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)     Illiterate     Irritable bowel syndrome     overweight - BMI >35     Takotsubo cardiomyopathy     CAD (coronary artery disease)     Restless legs syndrome (RLS)     CINDY (obstructive sleep apnea)- mild AHI 10.3     Verbal auditory hallucination     Chronic low back pain     Schizoaffective disorder, depressive type (H)     Migraine headache     HTN, goal below 140/90     Health Care Home     Lumbago     Cervicalgia     Cocaine abuse, episodic     Suicidal ideation     Esophageal  reflux     Mild nonproliferative diabetic retinopathy (H)     Tardive dyskinesia     Alcohol use     Left cataract     Falls frequently     History of uterine cancer     Depression     Psychophysiological insomnia     Dysuria     Asymptomatic postmenopausal status     Abdominal pain, right lower quadrant     Sepsis (H)     Pneumonia of right lower lobe due to infectious organism (H)     Infectious encephalopathy     Non-intractable vomiting with nausea     Acute respiratory failure with hypoxia (H)     Thoughts of self harm     Past Surgical History:   Procedure Laterality Date     C OOPHORECTORMY FOR RAHEL, W/BX  1983    UTERINE     CATARACT IOL, RT/LT Bilateral 2017     COLONOSCOPY N/A 3/16/2017    Procedure: COLONOSCOPY;  Surgeon: Traci Gonzalez MD;  Location:  GI     Coronary CTA  5/21/2014     HYSTERECTOMY  1983    uterine cancer yearly pap's per provider.     LAPAROSCOPIC CHOLECYSTECTOMY  2008     PHACOEMULSIFICATION CLEAR CORNEA WITH STANDARD INTRAOCULAR LENS IMPLANT Left 5/5/2017    Procedure: PHACOEMULSIFICATION CLEAR CORNEA WITH STANDARD INTRAOCULAR LENS IMPLANT;  LEFT EYE PHACOEMULSIFICATION CLEAR CORNEA WITH STANDARD INTRAOCULAR LENS IMPLANT ;  Surgeon: Tyra Diaz MD;  Location: [facility]     PHACOEMULSIFICATION CLEAR CORNEA WITH STANDARD INTRAOCULAR LENS IMPLANT Right 6/30/2017    Procedure: PHACOEMULSIFICATION CLEAR CORNEA WITH STANDARD INTRAOCULAR LENS IMPLANT;  RIGHT EYE PHACOEMULSIFICATION CLEAR CORNEA WITH STANDARD INTRAOCULAR LENS IMPLANT;  Surgeon: Tyra Diaz MD;  Location:  [facility]     RELEASE TRIGGER FINGER  10/11/2012    Left thumb. Procedure: RELEASE TRIGGER FINGER;  LEFT THUMB TRIGGER RELEASE;  Surgeon: Tay Langley MD;  Location:  [facility]     RELEASE TRIGGER FINGER Right 12/26/2016    Procedure: RELEASE TRIGGER FINGER;  Surgeon: Albino Castañeda MD;  Location:  OR       Social History   Substance Use Topics     Smoking status: Never Smoker     Smokeless  tobacco: Never Used     Alcohol use No      Comment: last month     Family History   Problem Relation Age of Onset     CANCER Mother      BLADDER     Respiratory Mother      COPD     GASTROINTESTINAL DISEASE Mother      CIRRHOSIS OF LI BOLIVAR     Alcohol/Drug Mother      DIABETES Mother      Hypertension Mother      Lipids Mother      C.A.D. Mother      Glaucoma Mother      Alcohol/Drug Sister      MENTAL ILLNESS Sister      Alcohol/Drug Sister      Psychotic Disorder Sister      CANCER Maternal Grandmother      UNKNOWN TYPE     CANCER Brother      COLON     Cancer - colorectal Brother      IN HIS LATE 30S     Alcohol/Drug Brother       OF HEROIN OVERDOSE AT AGE 22 YRS     Macular Degeneration No family hx of            Current Outpatient Prescriptions   Medication Sig Dispense Refill     order for DME Diabetic Shoes 2 each 0     insulin aspart (NOVOLOG FLEXPEN) 100 UNIT/ML injection Inject 20 Units Subcutaneous 3 times daily (with meals) Once daily, can add additional 5 units if BGs are >500mg/dL. 15 mL 3     losartan (COZAAR) 100 MG tablet TAKE 1 TABLET (100MG) BY MOUTH DAILY 28 tablet 3     chlorthalidone (HYGROTON) 25 MG tablet Take 1 tablet (25 mg) by mouth daily 30 tablet 3     gabapentin (NEURONTIN) 300 MG capsule TAKE 2 CAPSULES (600MG) BY MOUTH THREE TIMES A  capsule 3     insulin glargine (LANTUS) 100 UNIT/ML injection Inject 45 Units Subcutaneous At Bedtime 3 mL 3     blood glucose monitoring (ONETOUCH VERIO IQ SYSTEM) meter device kit Use to test blood sugar 2 times daily or as directed.  Ok to substitute alternative if insurance prefers. 1 kit 0     blood glucose monitoring (ONE TOUCH VERIO IQ) test strip Use to test blood sugar 2 times daily or as directed.  Ok to substitute alternative if insurance prefers. 150 strip 11     blood glucose monitoring (ONE TOUCH DELICA) lancets Use to test blood sugar 2 times daily or as directed.  Ok to substitute alternative if insurance prefers. 150 each 11      vitamin D3 (CHOLECALCIFEROL) 84921 UNITS capsule TAKE 1 CAPSULE (50,000 UNITS) BY MOUTH ONCE A WEEK 4 capsule 3     albuterol (PROAIR HFA/PROVENTIL HFA/VENTOLIN HFA) 108 (90 BASE) MCG/ACT Inhaler Inhale 2 puffs into the lungs every 6 hours as needed for shortness of breath / dyspnea or wheezing 3 Inhaler 1     BENZTROPINE MESYLATE PO Take 1 mg by mouth 2 times daily       CITALOPRAM HYDROBROMIDE PO Take 15 mg by mouth daily       senna-docusate (SENOKOT-S;PERICOLACE) 8.6-50 MG per tablet Take 1 tablet by mouth 2 times daily as needed for constipation       HALOPERIDOL PO Take 5 mg by mouth At Bedtime       ACETAMINOPHEN PO Take 1,000 mg by mouth every 6 hours as needed for pain or fever       atorvastatin (LIPITOR) 80 MG tablet TAKE 1 TABLET (80MG) BY MOUTH DAILY 28 tablet 11     metoprolol (TOPROL-XL) 100 MG 24 hr tablet TAKE 1 TABLET (100MG) BY MOUTH DAILY 28 tablet 11     polyethylene glycol (MIRALAX/GLYCOLAX) powder TAKE 17 GRAMS (1 CAPFUL) BY MOUTH DAILY 527 g 3     ranitidine (ZANTAC) 300 MG tablet TAKE 1 TABLET (300MG) BY MOUTH AT BEDTIME 28 tablet 3     ONE TOUCH ULTRA test strip TEST TWICE DAILY AS DIRECTED 150 strip 8     TRULICITY 1.5 MG/0.5ML pen INJECT 1.5MG SUBCUTANEOUSLY EVERY SEVEN DAYS 2 mL 11     ULTICARE MINI 31G X 6 MM insulin pen needle USE 4 DAILY OR AS DIRECTED 100 each 11     aspirin (ASPIRIN LOW DOSE) 81 MG EC tablet Take 1 tablet (81 mg) by mouth daily 100 tablet 4     ibuprofen (ADVIL,MOTRIN) 600 MG tablet Take 1 tablet (600 mg) by mouth every 4 hours as needed for moderate pain 60 tablet 0     melatonin 5 MG CAPS Take 1 capsule by mouth daily At dinnertime 90 capsule 1     glucose 4 G CHEW Take 2 every 15 minutes for blood sugar <70mg/dL. Recheck blood sugar every 15 minutes until above 70mg/dL, then eat a substantial meal. 20 tablet 1     order for DME Hold Novolog if meals are refused. 1 each 0     order for DME Equipment being ordered: Rolling walker 1 Device 0     Allergies    Allergen Reactions     Imidazole Antifungals Hives     Tolerates diflucan     Ketoprofen Itching     Pruritis to topical     Lisinopril Hives     Metformin Other (See Comments)     Patient hospitalized for lactic acidosis - admitting provider suspectd caused by metformin     Metronidazole Hives     Posaconazole Hives     Tolerates diflucan     Recent Labs   Lab Test  10/23/17   1515  09/04/17   0715  09/03/17   0705   08/30/17   0738  08/29/17   1325   06/27/17   1358   12/29/16   0909   10/25/16   0815   07/13/16   1350   07/14/15   1215   09/03/13   1156   A1C  8.8*   --    --    --   7.6*   --    --   7.0*   < >   --    < >   --    < >   --    < >  9.1*   < >  10.8*   LDL   --    --    --    --    --    --    --   64   --    --    --    --    --   137*   --   78   < >  90   HDL   --    --    --    --    --    --    --   37*   --    --    --    --    --    --    --   39*   --   37*   TRIG   --    --    --    --    --    --    --   267*   --    --    --    --    --    --    --   151*   --   171*   ALT  23   --    --    --   34  45   --   32   < >  26   < >  15   < >   --    < >   --    < >  38   CR  0.98  0.78  0.89   < >  0.88  0.90   < >  0.78   < >  0.72   < >  0.62   < >   --    < >  0.67   < >  0.54   GFRESTIMATED  58*  76  65   < >  66  65   < >  76   < >  83   < >  >90  Non  GFR Calc     < >   --    < >  >90  Non  GFR Calc     < >  >90   GFRESTBLACK  71  >90  79   < >  80  78   < >  >90   GFR Calc     < >  >90   GFR Calc     < >  >90   GFR Calc     < >   --    < >  >90   GFR Calc     < >  >90   POTASSIUM  4.6   --   3.9   < >  4.0  4.1   < >  4.3   < >  4.2   < >  3.8   < >   --    < >  4.3   < >  4.4   TSH   --    --    --    --    --    --    --    --    --   2.24   --   0.93   --    --    < >   --    < >  1.42    < > = values in this interval not displayed.      BP Readings from Last 3 Encounters:   10/23/17  90/56   09/07/17 116/74   09/04/17 138/65    Wt Readings from Last 3 Encounters:   10/23/17 226 lb (102.5 kg)   09/07/17 223 lb (101.2 kg)   09/04/17 224 lb 6.4 oz (101.8 kg)        Labs reviewed in EPIC  Problem list, Medication list, Allergies, and Medical/Social/Surgical histories reviewed in [facility] and updated as appropriate.     ROS: Constitutional, neuro, ENT, endocrine, pulmonary, cardiac, gastrointestinal, genitourinary, musculoskeletal, integument and psychiatric systems are negative, except as otherwise noted above in the HPI.   OBJECTIVE:                                                    /56  Pulse 85  Temp 98.2  F (36.8  C) (Oral)  Resp 16  Wt 227 lb (103 kg)  LMP 01/06/2015  SpO2 95%  BMI 42.89 kg/m2  Body mass index is 42.89 kg/(m^2).  GENERAL: Obese, alert, well nourished, well hydrated, no distress  EYES: Eyes grossly normal to inspection, extraocular movements - intact, and PERRL  RESP: lungs clear to auscultation - no rales, no rhonchi, no wheezes  CV: regular rates and rhythm, normal S1 S2, no S3 or S4 and no murmur, no click or rub - no homans or cords  ABDOMEN: soft, no tenderness,normal bowel sounds  MS: extremities- no gross deformities noted, no edema  SKIN: no suspicious lesions, no rashes  NEURO: strength and tone- normal, sensory exam- grossly normal, mentation- intact, speech- normal, reflexes- symmetric  Non focal no aphasia. No facial asymmetry  LYMPHATICS: ant. cervical- normal, post. cervical- normal, axillary- normal, supraclavicular- normal, inguinal- normal  Mental Status Assessment:  Appearance:   Appropriate   Eye Contact:   Fair   Psychomotor Behavior: Restless   Attitude:   Cooperative   Orientation:   All  Speech   Rate / Production: Normal    Volume:  Normal   Mood:    Normal  Affect:    Appropriate   Thought Content:  Clear   Thought Form:  Coherent  Logical   Insight:    Fair   Attention Span and Concentration:  appropriate  Recent and Remote Memory:   intact  Fund of Knowledge: appropriate  Muscle Strength and Tone: normal   Suicidal Ideation: reports no thoughts, no intention  Hallucination: Yes  Paranoid-No  Manic-No  Panic-No  Self harm-Yes      See [facility] notes     Diagnostic Test Results:  none      ASSESSMENT/PLAN:                                                    (Z23) Need for prophylactic vaccination and inoculation against influenza  (primary encounter diagnosis)  Comment: Routine annual vaccination.   Plan: FLU VAC, SPLIT VIRUS IM > 3 YO (QUADRIVALENT)         [05623], ADMIN INFLUENZA (For MEDICARE Patients        ONLY) [], Vaccine Administration, Initial         [07901]          (R45.89) Thoughts of self harm  Comment: as noted.   Plan: Continue with current therapy. Follow-up with psychiatrist.   -Patient knows to call her caregivers if the thoughts to harm herself are overwhelming and she needs to talk to someone.     (R49.361) Suicidal ideation  Comment: patient denies.   Plan: Continue with current therapy.   -Follow-up with psychiatrist for medication management.   -Encouraged patient to call clinic with any questions or concerns     (F25.1) Schizoaffective disorder, depressive type (H)  Comment: as noted above.   Plan: haloperidol (HALDOL) 5 MG tablet, haloperidol         (HALDOL) 5 MG tablet,         -Haldol 5 mg ordered as needed for when patient is reporting increased agitation.   -Will follow-up with EKG at the next visit if patient is frequently using her as needed dose.       Patient Instructions   -Flu vaccination today.   -Haldol 5 mg every 12 hours as needed for agitation.   -Call clinic with any questions or concerns.     I spent Greater than 40 minutes was spent face to face with Nena Tang, with greater than 50 % in counselling and consultation about self-harm behavior, staying safe and medication changes.     ART Fay [facility] PRIMARY CARE

## 2017-11-03 NOTE — PATIENT INSTRUCTIONS
-Flu vaccination today.   -Haldol 5 mg every 12 hours as needed for agitation.   -Call clinic with any questions or concerns.

## 2017-11-03 NOTE — PROGRESS NOTES
[facility]   November 3, 2017      Behavioral Health Clinician Progress Note    Patient Name: Nena Tang           Service Type: Individual      Service Location:   Face to Face in Clinic     Session Start Time: 10:12am  Session End Time: 10:31am      Session Length: 16 - 37      Attendees: Patient, PCP and caregiver    Visit Activities (Refresh list every visit): [facility] [employer]    Diagnostic Assessment Date: Not completed  Treatment Plan Review Date: Not completed  See Flowsheets for today's PHQ-9 and TAMIA-7 results  Previous PHQ-9:   PHQ-9 SCORE 12/20/2016 1/2/2017 2/3/2017   Total Score - - -   Total Score - - -   Total Score 7 0 0     Previous TAMIA-7:   TAMIA-7 SCORE 12/20/2016 1/2/2017 2/3/2017   Total Score - - -   Total Score 6 0 0   Total Score - - -       ALEXANDRA LEVEL:  ALEXANDRA Score (Last Two) 8/30/2011 6/9/2014   ALEXANDRA Raw Score 42 37   Activation Score 66 49.9   ALEXANDRA Level 3 2       DATA  Extended Session (60+ minutes): No  Interactive Complexity: No  Crisis: No    Treatment Objective(s) Addressed in This Session:  Target Behavior(s): disease management/lifestyle changes Mental Health    Depressed Mood: Increase interest, engagement, and pleasure in doing things  Decrease frequency and intensity of feeling down, depressed, hopeless  Improve quantity and quality of night time sleep / decrease daytime naps  Improve diet, appetite, mindful eating, and / or meal planning  Identify negative self-talk and behaviors: challenge core beliefs, myths, and actions  Improve concentration, focus, and mindfulness in daily activities   Decrease thoughts that you'd be better off dead or of suicide / self-harm  Anxiety: will experience a reduction in anxiety, will develop more effective coping skills to manage anxiety symptoms, will develop healthy cognitive patterns and beliefs and will increase ability to function adaptively  Risk / Safety: will develop strategies for more effective management of  risk issues and will follow safety plan (in EMR) for more effective management of risk issues  Grief / Loss: will engage in effective approach to address and resolve grief/loss issues and will process grief/loss issues in an adaptive manner  Alcohol / Substance Use: continue to make healthy choices regarding substance use and engage in activities / supportive services that promote sobriety  Thought Disturbance: will develop skills to more effectively manage symptoms, will develop more effective support systems and will continue to take medications as prescribed    Current Stressors / Issues:    [facility] met with patient at PCP request to assess current behavioral health needs and provide information and support as necessary.  Pt reported that this month is very difficult for her because of the anniversary of the death of her  and several other people in her family. Pt reported that this has caused her to want to hurt herself. Pt stated that she was cutting her hair with scissors and then started to think about cutting herself with the scissors because she was curious as to how it would feel. Writer used motivational interviewing to help pt see how hurting herself would not actually help the situation but in fact make it worse. Pt stated that she was willing to give the staff at her adult foster care the scissors but stated that there are several things around the house that she could use to cut herself. Pt and writer processed that there is choice involved in keeping herself safe and that she needs to make the choice in order for it to happen. Pt stated that she doesn't actually want to do harm to her body so she doesn't think she would cut herself but the thought is there. Pt stated that she would ask the group home staff for help if she needed it. Pt stated that she has 7 grandchildren who are very important to her and she wants to continue to have relationships with. Pt was able to identify several protective  factors and seems to have a good relationship with the staff at her group home. Pt contracted for safety and stated that this is a difficult time of her year for her every year but knows that she can get through it.    Reviewed new and ongoing stressors  Reviewed mental health symptoms and appropriate coping mechanisms    I affirmed the steps this patient has taken to address physical and behavioral health issues, and offered continued behavioral health services or referral, now or in the future, as needed by the patient.    Progress on Treatment Objective(s) / Homework:  Worsening - PREPARATION (Decided to change - considering how); Intervened by negotiating a change plan and determining options / strategies for behavior change, identifying triggers, exploring social supports, and working towards setting a date to begin behavior change    Motivational Interviewing    MI Intervention: Expressed Empathy/Understanding, Supported Autonomy, Collaboration, Evocation, Permission to raise concern or advise, Open-ended questions and Reflections: simple and complex     Change Talk Expressed by the Patient: Desire to change Reasons to change Need to change Committment to change Activation Taking steps    Provider Response to Change Talk: E - Evoked more info from patient about behavior change, A - Affirmed patient's thoughts, decisions, or attempts at behavior change, R - Reflected patient's change talk and S - Summarized patient's change talk statements    Also provided psychoeducation about behavioral health condition, symptoms, and treatment options    Care Plan review completed: No    Medication Review:  See medication list    Medication Compliance:  NA    Changes in Health Issues:   Yes: please refer to PCP note on same day and same time    Chemical Use Review:   Substance Use: Chemical use reviewed, no active concerns identified      Tobacco Use: No current tobacco use.      Assessment: Current Emotional / Mental Status  (status of significant symptoms):  Risk status (Self / Other harm or suicidal ideation)  Patient has had a history of suicidal ideation: yes in the past  Patient denies current fears or concerns for personal safety.  Patient denies current or recent suicidal ideation or behaviors.  Patient denies current or recent homicidal ideation or behaviors.  Patient reports current or recent self injurious behavior or ideation including no current thoughts and she was able to contract for safety.  Patient denies other safety concerns.  A safety and risk management plan has not been developed at this time, however patient was encouraged to call [place] [phone] should there be a change in any of these risk factors.    Appearance:   Appropriate   Eye Contact:   Good   Psychomotor Behavior: Normal   Attitude:   Cooperative   Orientation:   All  Speech   Rate / Production: Normal    Volume:  Normal   Mood:    Normal  Affect:    Appropriate   Thought Content:  Clear   Thought Form:  Coherent  Logical   Insight:    Fair     Diagnoses:  1. Moderate major depression (H)    2. Schizoaffective disorder, depressive type (H)        Collateral Reports Completed:  Not Applicable    Plan: (Homework, other):  Patient was given information about behavioral services and encouraged to schedule a follow up appointment with the clinic [facility] as needed.  She was also given information about mental health symptoms and treatment options .  CD Recommendations: Maintain Sobriety.      BIN Reyes, [facility] February 3, 2017

## 2017-11-03 NOTE — NURSING NOTE
"Chief Complaint   Patient presents with     RECHECK       Initial /56  Pulse 85  Temp 98.2  F (36.8  C) (Oral)  Resp 16  Wt 227 lb (103 kg)  LMP 01/06/2015  SpO2 95%  BMI 42.89 kg/m2 Estimated body mass index is 42.89 kg/(m^2) as calculated from the following:    Height as of 9/7/17: 5' 1\" (1.549 m).    Weight as of this encounter: 227 lb (103 kg).  Medication Reconciliation: complete     Adam Escamilla, [facility]    "

## 2017-11-03 NOTE — MR AVS SNAPSHOT
After Visit Summary   11/3/2017    Nena Tang    MRN: [id number]           Patient Information     Date Of Birth          1960        Visit Information        Provider Department      11/3/2017 9:30 AM Yessenia Gorman, [facility]        Today's Diagnoses     Moderate major depression (H)    -  1    Schizoaffective disorder, depressive type (H)           Follow-ups after your visit        Your next 10 appointments already scheduled     Nov 15, 2017 10:15 AM CST   RETURN RETINA with Tiburcio Chacon MD   Eye Clinic ([facility])    [address]  [address]  [address]  [facility] [address]   [phone]            Nov 27, 2017  1:00 PM CST   Return Visit with ART Wetzel [facility] ([facility]    [address]  [address]  [facility] [id number] [phone]            Nov 27, 2017  1:00 PM CST   Return Visit with Richardson Lopez [facility] ([facility])    [address]  [address]  [facility] [address] [phone]              Who to contact     If you have questions or need follow up information about today's clinic visit or your schedule please contact [facility] directly at [phone].  Normal or non-critical lab and imaging results will be communicated to you by MyChart, letter or phone within 4 business days after the clinic has received the results. If you do not hear from us within 7 days, please contact the clinic through MyChart or phone. If you have a critical or abnormal lab result, we will notify you by phone as soon as possible.  Submit refill requests through Datalot or call your pharmacy and they will forward the refill request to us. Please allow 3 business days for your refill to be completed.           Additional Information About Your Visit        Tiangehart Information     Weeleo gives you secure access to your electronic health record. If you see a primary care provider, you can also send messages to your care team and make appointments. If you have questions, please call your primary care clinic.  If you do not have a primary care provider, please call [phone] and they will assist you.        Care EveryWhere ID     This is your Care EveryWhere ID. This could be used by other organizations to access your [place] medical records  [id number]        Your Vitals Were     Last Period                   01/06/2015            Blood Pressure from Last 3 Encounters:   11/03/17 100/56   10/23/17 90/56   09/07/17 116/74    Weight from Last 3 Encounters:   11/03/17 227 lb (103 kg)   10/23/17 226 lb (102.5 kg)   09/07/17 223 lb (101.2 kg)              Today, you had the following     No orders found for display         Today's Medication Changes          These changes are accurate as of: 11/3/17 12:28 PM.  If you have any questions, ask your nurse or doctor.               Start taking these medicines.        Dose/Directions    * haloperidol 5 MG tablet   Commonly known as:  HALDOL   Used for:  Schizoaffective disorder, depressive type (H)   Started by:  Rowena Haas APRN CNP        Dose:  5 mg   Take 1 tablet (5 mg) by mouth At Bedtime   Quantity:  30 tablet   Refills:  1       * haloperidol 5 MG tablet   Commonly known as:  HALDOL   Used for:  Schizoaffective disorder, depressive type (H)   Started by:  Rowena Haas APRN CNP        Dose:  5 mg   Take 1 tablet (5 mg) by mouth every 12 hours as needed for agitation   Quantity:  30 tablet   Refills:  1       * Notice:  This list has 2 medication(s) that are the same as other medications prescribed for you. Read the directions carefully, and ask your doctor or other care provider to review them with you.         Where to get your medicines       These medications were sent to [place], [place] - [address]  [address] [facility] [address]     Phone:  [phone]     haloperidol 5 MG tablet    haloperidol 5 MG tablet                Primary Care Provider Office Phone # Fax #    ART Wetzel [phone]       [address]  [facility] [address]        Goals        General    Medical (pt-stated)     Notes - Note created  7/7/2016  9:15 AM by Chelsea Pulido, RN    Get blood sugars under control    Improve medication compliance    As of today's date 7/7/2016 goal is met at 0 - 25%.   Goal Status:  Active          Equal Access to Services     RAMESH GARRIDO : Azul wadeo Someliaali, waaxda luqadaha, qaybta kaalmada adeegyada, lorena ellison . So [facility] [phone].    ATENCIÓN: Si habla español, tiene a trinh disposición servicios gratuitos de asistencia lingüística. Llame al [phone].    We comply with applicable federal civil rights laws and [place] laws. We do not discriminate on the basis of race, color, national origin, age, disability, sex, sexual orientation, or gender identity.            Thank you!     Thank you for choosing [facility]  for your care. Our goal is always to provide you with excellent care. Hearing back from our patients is one way we can continue to improve our services. Please take a few minutes to complete the written survey that you may receive in the mail after your visit with us. Thank you!             Your Updated Medication List - Protect others around you: Learn how to safely use, store and throw away your medicines at www.disposemymeds.org.          This list is accurate as of: 11/3/17 12:28 PM.  Always use your most recent med list.                   Brand Name Dispense Instructions for use Diagnosis    ACETAMINOPHEN PO      Take 1,000 mg by mouth every 6 hours as  needed for pain or fever        albuterol 108 (90 BASE) MCG/ACT Inhaler    PROAIR HFA/PROVENTIL HFA/VENTOLIN HFA    3 Inhaler    Inhale 2 puffs into the lungs every 6 hours as needed for shortness of breath / dyspnea or wheezing    Dyspnea on exertion       aspirin 81 MG EC tablet    ASPIRIN LOW DOSE    100 tablet    Take 1 tablet (81 mg) by mouth daily    Coronary artery disease involving native heart with angina pectoris, unspecified vessel or lesion type (H)       atorvastatin 80 MG tablet    LIPITOR    28 tablet    TAKE 1 TABLET (80MG) BY MOUTH DAILY    Hyperlipidemia LDL goal <100       BENZTROPINE MESYLATE PO      Take 1 mg by mouth 2 times daily        blood glucose monitoring lancets     150 each    Use to test blood sugar 2 times daily or as directed.  Ok to substitute alternative if insurance prefers.    Type 2 diabetes mellitus with diabetic neuropathy, with long-term current use of insulin (H)       blood glucose monitoring meter device kit     1 kit    Use to test blood sugar 2 times daily or as directed.  Ok to substitute alternative if insurance prefers.    Type 2 diabetes mellitus with diabetic neuropathy, with long-term current use of insulin (H)       chlorthalidone 25 MG tablet    HYGROTON    30 tablet    Take 1 tablet (25 mg) by mouth daily    HTN, goal below 140/90       CITALOPRAM HYDROBROMIDE PO      Take 15 mg by mouth daily        gabapentin 300 MG capsule    NEURONTIN    168 capsule    TAKE 2 CAPSULES (600MG) BY MOUTH THREE TIMES A DAY    Type 2 diabetes mellitus with diabetic neuropathy, with long-term current use of insulin (H)       glucose 4 G Chew chewable tablet     20 tablet    Take 2 every 15 minutes for blood sugar <70mg/dL. Recheck blood sugar every 15 minutes until above 70mg/dL, then eat a substantial meal.    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, with long-term current use of insulin, unspecified laterality (H)       *  haloperidol 5 MG tablet    HALDOL    30 tablet    Take 1 tablet (5 mg) by mouth At Bedtime    Schizoaffective disorder, depressive type (H)       * haloperidol 5 MG tablet    HALDOL    30 tablet    Take 1 tablet (5 mg) by mouth every 12 hours as needed for agitation    Schizoaffective disorder, depressive type (H)       ibuprofen 600 MG tablet    ADVIL/MOTRIN    60 tablet    Take 1 tablet (600 mg) by mouth every 4 hours as needed for moderate pain    Closed fracture of one rib of right side, initial encounter       insulin aspart 100 UNIT/ML injection    NovoLOG FLEXPEN    15 mL    Inject 20 Units Subcutaneous 3 times daily (with meals) Once daily, can add additional 5 units if BGs are >500mg/dL.    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, with long-term current use of insulin, unspecified laterality (H)       insulin glargine 100 UNIT/ML injection    LANTUS    3 mL    Inject 45 Units Subcutaneous At Bedtime    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, with long-term current use of insulin, unspecified laterality (H)       losartan 100 MG tablet    COZAAR    28 tablet    TAKE 1 TABLET (100MG) BY MOUTH DAILY    Coronary artery disease involving native heart with angina pectoris, unspecified vessel or lesion type (H)       melatonin 5 MG Caps     90 capsule    Take 1 capsule by mouth daily At dinnertime    Insomnia, unspecified type       metoprolol 100 MG 24 hr tablet    TOPROL-XL    28 tablet    TAKE 1 TABLET (100MG) BY MOUTH DAILY    Coronary artery disease involving native heart with angina pectoris, unspecified vessel or lesion type (H)       * ONETOUCH ULTRA test strip   Generic drug:  blood glucose monitoring     150 strip    TEST TWICE DAILY AS DIRECTED    Type 2 diabetes mellitus with diabetic neuropathy, with long-term current use of insulin (H)       * blood glucose monitoring test strip    ONETOUCH VERIO IQ    150  strip    Use to test blood sugar 2 times daily or as directed.  Ok to substitute alternative if insurance prefers.    Type 2 diabetes mellitus with diabetic neuropathy, with long-term current use of insulin (H)       * order for DME     1 Device    Equipment being ordered: Rolling walker    Falls frequently       * order for DME     1 each    Hold Novolog if meals are refused.    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, with long-term current use of insulin, unspecified laterality (H)       polyethylene glycol powder    MIRALAX/GLYCOLAX    527 g    TAKE 17 GRAMS (1 CAPFUL) BY MOUTH DAILY    Constipation, unspecified constipation type       ranitidine 300 MG tablet    ZANTAC    28 tablet    TAKE 1 TABLET (300MG) BY MOUTH AT BEDTIME    Gastroesophageal reflux disease without esophagitis       senna-docusate 8.6-50 MG per tablet    SENOKOT-S;PERICOLACE     Take 1 tablet by mouth 2 times daily as needed for constipation        TRULICITY 1.5 MG/0.5ML pen   Generic drug:  dulaglutide     2 mL    INJECT 1.5MG SUBCUTANEOUSLY EVERY SEVEN DAYS    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, with long-term current use of insulin, unspecified laterality (H)       ULTICARE MINI 31G X 6 MM   Generic drug:  insulin pen needle     100 each    USE 4 DAILY OR AS DIRECTED    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, with long-term current use of insulin, unspecified laterality (H)       vitamin D3 86008 UNITS capsule    CHOLECALCIFEROL    4 capsule    TAKE 1 CAPSULE (50,000 UNITS) BY MOUTH ONCE A WEEK    Vitamin D deficiency       * Notice:  This list has 7 medication(s) that are the same as other  medications prescribed for you. Read the directions carefully, and ask your doctor or other care provider to review them with you.

## 2017-11-03 NOTE — MR AVS SNAPSHOT
After Visit Summary   11/3/2017    Nena Tang    MRN: [id number]           Patient Information     Date Of Birth          1960        Visit Information        Provider Department      11/3/2017 9:30 AM Rowena Haas APRN CNP [facility]        Today's Diagnoses     Need for prophylactic vaccination and inoculation against influenza    -  1    Thoughts of self harm        Suicidal ideation        Schizoaffective disorder, depressive type (H)          Care Instructions    -Flu vaccination today.   -Haldol 5 mg every 12 hours as needed for agitation.   -Call clinic with any questions or concerns.           Follow-ups after your visit        Your next 10 appointments already scheduled     Nov 15, 2017 10:15 AM CST   RETURN RETINA with Tiburcio Chacon MD   Eye Clinic ([facility])    [address] [facility]  [address]  [address]  [facility] [id number] [phone]              Who to contact     If you have questions or need follow up information about today's clinic visit or your schedule please contact [facility] directly at [phone].  Normal or non-critical lab and imaging results will be communicated to you by Yubhart, letter or phone within 4 business days after the clinic has received the results. If you do not hear from us within 7 days, please contact the clinic through Yubhart or phone. If you have a critical or abnormal lab result, we will notify you by phone as soon as possible.  Submit refill requests through Chicfy or call your pharmacy and they will forward the refill request to us. Please allow 3 business days for your refill to be completed.          Additional Information About Your Visit        Yubhart Information     Chicfy gives you secure access to your electronic health record. If you see a primary care provider, you can also send messages to your care team and  make appointments. If you have questions, please call your primary care clinic.  If you do not have a primary care provider, please call [phone] and they will assist you.        Care EveryWhere ID     This is your Care EveryWhere ID. This could be used by other organizations to access your [place] medical records  [id number]        Your Vitals Were     Pulse Temperature Respirations Last Period Pulse Oximetry BMI (Body Mass Index)    85 98.2  F (36.8  C) (Oral) 16 01/06/2015 95% 42.89 kg/m2       Blood Pressure from Last 3 Encounters:   11/03/17 100/56   10/23/17 90/56   09/07/17 116/74    Weight from Last 3 Encounters:   11/03/17 227 lb (103 kg)   10/23/17 226 lb (102.5 kg)   09/07/17 223 lb (101.2 kg)              We Performed the Following     ADMIN INFLUENZA (For MEDICARE Patients ONLY) []     FLU VAC, SPLIT VIRUS IM > 3 YO (QUADRIVALENT) [74620]     Vaccine Administration, Initial [61587]          Today's Medication Changes          These changes are accurate as of: 11/3/17 10:39 AM.  If you have any questions, ask your nurse or doctor.               Start taking these medicines.        Dose/Directions    * haloperidol 5 MG tablet   Commonly known as:  HALDOL   Used for:  Schizoaffective disorder, depressive type (H)   Started by:  Rowena Haas APRN CNP        Dose:  5 mg   Take 1 tablet (5 mg) by mouth At Bedtime   Quantity:  30 tablet   Refills:  1       * haloperidol 5 MG tablet   Commonly known as:  HALDOL   Used for:  Schizoaffective disorder, depressive type (H)   Started by:  Rowena Haas APRN CNP        Dose:  5 mg   Take 1 tablet (5 mg) by mouth every 12 hours as needed for agitation   Quantity:  30 tablet   Refills:  1       * Notice:  This list has 2 medication(s) that are the same as other medications prescribed for you. Read the directions carefully, and ask your doctor or other care provider to review them with you.         Where to get your medicines      These  medications were sent to [address]  [address] [facility] [address]     Phone:  [phone]     haloperidol 5 MG tablet    haloperidol 5 MG tablet                Primary Care Provider Office Phone # Fax #    ART Wetzel [phone]       [address] [facility] [address]  [facility] [place] [address]        Goals        General    Medical (pt-stated)     Notes - Note created  7/7/2016  9:15 AM by Chelsea Pulido, RN    Get blood sugars under control    Improve medication compliance    As of today's date 7/7/2016 goal is met at 0 - 25%.   Goal Status:  Active          Equal Access to Services     RAMESH GARRIDO : Azul wadeo Someliaali, waaxda luqadaha, qaybta kaalmada adeegyada, lorena ellison . So [facility] [phone].    ATENCIÓN: Si habla español, tiene a trinh disposición servicios gratuitos de asistencia lingüística. Llame al [phone].    We comply with applicable federal civil rights laws and [place] laws. We do not discriminate on the basis of race, color, national origin, age, disability, sex, sexual orientation, or gender identity.            Thank you!     Thank you for choosing [facility]  for your care. Our goal is always to provide you with excellent care. Hearing back from our patients is one way we can continue to improve our services. Please take a few minutes to complete the written survey that you may receive in the mail after your visit with us. Thank you!             Your Updated Medication List - Protect others around you: Learn how to safely use, store and throw away your medicines at www.disposemymeds.org.          This list is accurate as of: 11/3/17 10:39 AM.  Always use your most recent med list.                   Brand Name Dispense Instructions for use Diagnosis    ACETAMINOPHEN PO      Take 1,000 mg by mouth every 6 hours as needed for  pain or fever        albuterol 108 (90 BASE) MCG/ACT Inhaler    PROAIR HFA/PROVENTIL HFA/VENTOLIN HFA    3 Inhaler    Inhale 2 puffs into the lungs every 6 hours as needed for shortness of breath / dyspnea or wheezing    Dyspnea on exertion       aspirin 81 MG EC tablet    ASPIRIN LOW DOSE    100 tablet    Take 1 tablet (81 mg) by mouth daily    Coronary artery disease involving native heart with angina pectoris, unspecified vessel or lesion type (H)       atorvastatin 80 MG tablet    LIPITOR    28 tablet    TAKE 1 TABLET (80MG) BY MOUTH DAILY    Hyperlipidemia LDL goal <100       BENZTROPINE MESYLATE PO      Take 1 mg by mouth 2 times daily        blood glucose monitoring lancets     150 each    Use to test blood sugar 2 times daily or as directed.  Ok to substitute alternative if insurance prefers.    Type 2 diabetes mellitus with diabetic neuropathy, with long-term current use of insulin (H)       blood glucose monitoring meter device kit     1 kit    Use to test blood sugar 2 times daily or as directed.  Ok to substitute alternative if insurance prefers.    Type 2 diabetes mellitus with diabetic neuropathy, with long-term current use of insulin (H)       chlorthalidone 25 MG tablet    HYGROTON    30 tablet    Take 1 tablet (25 mg) by mouth daily    HTN, goal below 140/90       CITALOPRAM HYDROBROMIDE PO      Take 15 mg by mouth daily        gabapentin 300 MG capsule    NEURONTIN    168 capsule    TAKE 2 CAPSULES (600MG) BY MOUTH THREE TIMES A DAY    Type 2 diabetes mellitus with diabetic neuropathy, with long-term current use of insulin (H)       glucose 4 G Chew chewable tablet     20 tablet    Take 2 every 15 minutes for blood sugar <70mg/dL. Recheck blood sugar every 15 minutes until above 70mg/dL, then eat a substantial meal.    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, with long-term current use of insulin, unspecified laterality (H)       * haloperidol 5 MG  tablet    HALDOL    30 tablet    Take 1 tablet (5 mg) by mouth At Bedtime    Schizoaffective disorder, depressive type (H)       * haloperidol 5 MG tablet    HALDOL    30 tablet    Take 1 tablet (5 mg) by mouth every 12 hours as needed for agitation    Schizoaffective disorder, depressive type (H)       ibuprofen 600 MG tablet    ADVIL/MOTRIN    60 tablet    Take 1 tablet (600 mg) by mouth every 4 hours as needed for moderate pain    Closed fracture of one rib of right side, initial encounter       insulin aspart 100 UNIT/ML injection    NovoLOG FLEXPEN    15 mL    Inject 20 Units Subcutaneous 3 times daily (with meals) Once daily, can add additional 5 units if BGs are >500mg/dL.    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, with long-term current use of insulin, unspecified laterality (H)       insulin glargine 100 UNIT/ML injection    LANTUS    3 mL    Inject 45 Units Subcutaneous At Bedtime    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, with long-term current use of insulin, unspecified laterality (H)       losartan 100 MG tablet    COZAAR    28 tablet    TAKE 1 TABLET (100MG) BY MOUTH DAILY    Coronary artery disease involving native heart with angina pectoris, unspecified vessel or lesion type (H)       melatonin 5 MG Caps     90 capsule    Take 1 capsule by mouth daily At dinnertime    Insomnia, unspecified type       metoprolol 100 MG 24 hr tablet    TOPROL-XL    28 tablet    TAKE 1 TABLET (100MG) BY MOUTH DAILY    Coronary artery disease involving native heart with angina pectoris, unspecified vessel or lesion type (H)       * ONETOUCH ULTRA test strip   Generic drug:  blood glucose monitoring     150 strip    TEST TWICE DAILY AS DIRECTED    Type 2 diabetes mellitus with diabetic neuropathy, with long-term current use of insulin (H)       * blood glucose monitoring test strip    ONETOUCH VERIO IQ    150 strip    Use to  test blood sugar 2 times daily or as directed.  Ok to substitute alternative if insurance prefers.    Type 2 diabetes mellitus with diabetic neuropathy, with long-term current use of insulin (H)       * order for DME     1 Device    Equipment being ordered: Rolling walker    Falls frequently       * order for DME     1 each    Hold Novolog if meals are refused.    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, with long-term current use of insulin, unspecified laterality (H)       polyethylene glycol powder    MIRALAX/GLYCOLAX    527 g    TAKE 17 GRAMS (1 CAPFUL) BY MOUTH DAILY    Constipation, unspecified constipation type       ranitidine 300 MG tablet    ZANTAC    28 tablet    TAKE 1 TABLET (300MG) BY MOUTH AT BEDTIME    Gastroesophageal reflux disease without esophagitis       senna-docusate 8.6-50 MG per tablet    SENOKOT-S;PERICOLACE     Take 1 tablet by mouth 2 times daily as needed for constipation        TRULICITY 1.5 MG/0.5ML pen   Generic drug:  dulaglutide     2 mL    INJECT 1.5MG SUBCUTANEOUSLY EVERY SEVEN DAYS    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, with long-term current use of insulin, unspecified laterality (H)       ULTICARE MINI 31G X 6 MM   Generic drug:  insulin pen needle     100 each    USE 4 DAILY OR AS DIRECTED    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, with long-term current use of insulin, unspecified laterality (H)       vitamin D3 78050 UNITS capsule    CHOLECALCIFEROL    4 capsule    TAKE 1 CAPSULE (50,000 UNITS) BY MOUTH ONCE A WEEK    Vitamin D deficiency       * Notice:  This list has 7 medication(s) that are the same as other medications  prescribed for you. Read the directions carefully, and ask your doctor or other care provider to review them with you.

## 2017-11-06 ENCOUNTER — HOSPITAL ENCOUNTER (INPATIENT)
Facility: CLINIC | Age: 57
LOS: 4 days | Discharge: NURSING, REHAB OR LONG-TERM CARE | DRG: 885 | End: 2017-11-10
Attending: PSYCHIATRY & NEUROLOGY | Admitting: PSYCHIATRY & NEUROLOGY
Payer: MEDICARE

## 2017-11-06 DIAGNOSIS — Z79.4 TYPE 2 DIABETES MELLITUS WITH MILD NONPROLIFERATIVE RETINOPATHY WITHOUT MACULAR EDEMA, WITH LONG-TERM CURRENT USE OF INSULIN, UNSPECIFIED LATERALITY (H): ICD-10-CM

## 2017-11-06 DIAGNOSIS — F25.1 SCHIZOAFFECTIVE DISORDER, DEPRESSIVE TYPE (H): ICD-10-CM

## 2017-11-06 DIAGNOSIS — E11.3299 TYPE 2 DIABETES MELLITUS WITH MILD NONPROLIFERATIVE RETINOPATHY WITHOUT MACULAR EDEMA, WITH LONG-TERM CURRENT USE OF INSULIN, UNSPECIFIED LATERALITY (H): ICD-10-CM

## 2017-11-06 DIAGNOSIS — R45.851 SUICIDAL IDEATION: ICD-10-CM

## 2017-11-06 LAB
GLUCOSE BLDC GLUCOMTR-MCNC: 160 MG/DL (ref 70–99)
GLUCOSE BLDC GLUCOMTR-MCNC: 212 MG/DL (ref 70–99)

## 2017-11-06 PROCEDURE — 90686 IIV4 VACC NO PRSV 0.5 ML IM: CPT | Performed by: NURSE PRACTITIONER

## 2017-11-06 PROCEDURE — 12400007 ZZH R&B MH INTERMEDIATE UMMC

## 2017-11-06 PROCEDURE — 00000146 ZZHCL STATISTIC GLUCOSE BY METER IP

## 2017-11-06 PROCEDURE — 99285 EMERGENCY DEPT VISIT HI MDM: CPT | Mod: Z6 | Performed by: PSYCHIATRY & NEUROLOGY

## 2017-11-06 PROCEDURE — 99285 EMERGENCY DEPT VISIT HI MDM: CPT | Mod: 25 | Performed by: PSYCHIATRY & NEUROLOGY

## 2017-11-06 PROCEDURE — 90791 PSYCH DIAGNOSTIC EVALUATION: CPT

## 2017-11-06 RX ORDER — CHLORTHALIDONE 25 MG/1
25 TABLET ORAL DAILY
Status: DISCONTINUED | OUTPATIENT
Start: 2017-11-07 | End: 2017-11-10 | Stop reason: HOSPADM

## 2017-11-06 RX ORDER — NICOTINE POLACRILEX 4 MG
15-30 LOZENGE BUCCAL
Status: DISCONTINUED | OUTPATIENT
Start: 2017-11-06 | End: 2017-11-06

## 2017-11-06 RX ORDER — ATORVASTATIN CALCIUM 40 MG/1
80 TABLET, FILM COATED ORAL DAILY
Status: DISCONTINUED | OUTPATIENT
Start: 2017-11-07 | End: 2017-11-10 | Stop reason: HOSPADM

## 2017-11-06 RX ORDER — METOPROLOL SUCCINATE 100 MG/1
100 TABLET, EXTENDED RELEASE ORAL DAILY
Status: DISCONTINUED | OUTPATIENT
Start: 2017-11-07 | End: 2017-11-10 | Stop reason: HOSPADM

## 2017-11-06 RX ORDER — ACETAMINOPHEN 500 MG
1000 TABLET ORAL EVERY 6 HOURS PRN
Status: DISCONTINUED | OUTPATIENT
Start: 2017-11-06 | End: 2017-11-10 | Stop reason: HOSPADM

## 2017-11-06 RX ORDER — ASPIRIN 81 MG/1
81 TABLET ORAL DAILY
Status: DISCONTINUED | OUTPATIENT
Start: 2017-11-07 | End: 2017-11-10 | Stop reason: HOSPADM

## 2017-11-06 RX ORDER — NICOTINE POLACRILEX 4 MG
15-30 LOZENGE BUCCAL
Status: DISCONTINUED | OUTPATIENT
Start: 2017-11-06 | End: 2017-11-10 | Stop reason: HOSPADM

## 2017-11-06 RX ORDER — OLANZAPINE 10 MG/2ML
10 INJECTION, POWDER, FOR SOLUTION INTRAMUSCULAR
Status: DISCONTINUED | OUTPATIENT
Start: 2017-11-06 | End: 2017-11-10 | Stop reason: HOSPADM

## 2017-11-06 RX ORDER — LOSARTAN POTASSIUM 100 MG/1
100 TABLET ORAL DAILY
Status: DISCONTINUED | OUTPATIENT
Start: 2017-11-07 | End: 2017-11-09

## 2017-11-06 RX ORDER — DEXTROSE MONOHYDRATE 25 G/50ML
25-50 INJECTION, SOLUTION INTRAVENOUS
Status: DISCONTINUED | OUTPATIENT
Start: 2017-11-06 | End: 2017-11-06

## 2017-11-06 RX ORDER — IBUPROFEN 600 MG/1
600 TABLET, FILM COATED ORAL EVERY 4 HOURS PRN
Status: DISCONTINUED | OUTPATIENT
Start: 2017-11-06 | End: 2017-11-10 | Stop reason: HOSPADM

## 2017-11-06 RX ORDER — HYDROXYZINE HYDROCHLORIDE 25 MG/1
25-50 TABLET, FILM COATED ORAL EVERY 4 HOURS PRN
Status: DISCONTINUED | OUTPATIENT
Start: 2017-11-06 | End: 2017-11-10 | Stop reason: HOSPADM

## 2017-11-06 RX ORDER — HALOPERIDOL 5 MG/1
5 TABLET ORAL EVERY 12 HOURS PRN
Status: DISCONTINUED | OUTPATIENT
Start: 2017-11-06 | End: 2017-11-10 | Stop reason: HOSPADM

## 2017-11-06 RX ORDER — DEXTROSE MONOHYDRATE 25 G/50ML
25-50 INJECTION, SOLUTION INTRAVENOUS
Status: DISCONTINUED | OUTPATIENT
Start: 2017-11-06 | End: 2017-11-10 | Stop reason: HOSPADM

## 2017-11-06 RX ORDER — ALBUTEROL SULFATE 90 UG/1
2 AEROSOL, METERED RESPIRATORY (INHALATION) EVERY 6 HOURS PRN
Status: DISCONTINUED | OUTPATIENT
Start: 2017-11-06 | End: 2017-11-10 | Stop reason: HOSPADM

## 2017-11-06 RX ORDER — HALOPERIDOL 5 MG/1
5 TABLET ORAL AT BEDTIME
Status: DISCONTINUED | OUTPATIENT
Start: 2017-11-06 | End: 2017-11-07

## 2017-11-06 RX ORDER — GABAPENTIN 300 MG/1
600 CAPSULE ORAL 3 TIMES DAILY
Status: DISCONTINUED | OUTPATIENT
Start: 2017-11-06 | End: 2017-11-10 | Stop reason: HOSPADM

## 2017-11-06 RX ORDER — BENZTROPINE MESYLATE 0.5 MG/1
1 TABLET ORAL 2 TIMES DAILY
Status: DISCONTINUED | OUTPATIENT
Start: 2017-11-06 | End: 2017-11-10 | Stop reason: HOSPADM

## 2017-11-06 RX ORDER — OLANZAPINE 10 MG/1
10 TABLET ORAL
Status: DISCONTINUED | OUTPATIENT
Start: 2017-11-06 | End: 2017-11-10 | Stop reason: HOSPADM

## 2017-11-06 ASSESSMENT — ACTIVITIES OF DAILY LIVING (ADL)
DRESS: INDEPENDENT;SCRUBS (BEHAVIORAL HEALTH)
RETIRED_EATING: 0-->INDEPENDENT
RETIRED_COMMUNICATION: 0-->UNDERSTANDS/COMMUNICATES WITHOUT DIFFICULTY
BATHING: 0-->INDEPENDENT
TRANSFERRING: 0-->INDEPENDENT
DRESS: 0-->INDEPENDENT
SWALLOWING: 0-->SWALLOWS FOODS/LIQUIDS WITHOUT DIFFICULTY
GROOMING: INDEPENDENT
FALL_HISTORY_WITHIN_LAST_SIX_MONTHS: YES
COGNITION: 0 - NO COGNITION ISSUES REPORTED
NUMBER_OF_TIMES_PATIENT_HAS_FALLEN_WITHIN_LAST_SIX_MONTHS: 2
TOILETING: 0-->INDEPENDENT
ORAL_HYGIENE: INDEPENDENT
AMBULATION: 1-->ASSISTIVE EQUIPMENT

## 2017-11-06 ASSESSMENT — ENCOUNTER SYMPTOMS
DECREASED CONCENTRATION: 1
HYPERACTIVE: 0
CONSTITUTIONAL NEGATIVE: 1
NEUROLOGICAL NEGATIVE: 1
NERVOUS/ANXIOUS: 1
MUSCULOSKELETAL NEGATIVE: 1
ENDOCRINE NEGATIVE: 1
GASTROINTESTINAL NEGATIVE: 1
EYES NEGATIVE: 1
HEMATOLOGIC/LYMPHATIC NEGATIVE: 1
CARDIOVASCULAR NEGATIVE: 1
RESPIRATORY NEGATIVE: 1
HALLUCINATIONS: 1
SLEEP DISTURBANCE: 1

## 2017-11-06 NOTE — NURSING NOTE
"Injectable Influenza Immunization Documentation    1.  Has the patient received the information for the injectable influenza vaccine? YES     2. Is the patient 6 months of age or older? YES     3. Does the patient have any of the following contraindications?         Severe allergy to eggs? No     Severe allergic reaction to previous influenza vaccines? No   Severe allergy to latex? No       History of Guillain-Woodbine syndrome? No     Currently have a temperature greater than 100.4F? No        4.  Severely egg allergic patients should have flu vaccine eligibility assessed by an MD, RN, or pharmacist, and those who received flu vaccine should be observed for 15 min by an MD, RN, Pharmacist, Medical Technician, or member of clinic staff.\": YES           Vaccination given by Lio Wilder MA        "

## 2017-11-06 NOTE — IP AVS SNAPSHOT
MRN:[id number]                      After Visit Summary   11/6/2017    Nena Tang    MRN: [id number]           Thank you!     Thank you for choosing [facility] for your care. Our goal is always to provide you with excellent care.        Patient Information     Date Of Birth          1960        Designated Caregiver       Most Recent Value    Caregiver    Will someone help with your care after discharge? yes    Name of designated caregiver Gisselle    Phone number of caregiver in cell phone    Caregiver address [address]      About your hospital stay     You were admitted on:  November 6, 2017 You last received care in the:  [address]    You were discharged on:  November 10, 2017       Who to Call     For medical emergencies, please call 911.  For non-urgent questions about your medical care, please call your primary care provider or clinic, [phone]          Attending Provider     Provider Specialty    Fadi Pearl MD Psychiatry    Wade Siddiqi MD Psychiatry       Primary Care Provider Office Phone # Fax # Hillary Winkelmann [phone]      Your next 10 appointments already scheduled     Nov 15, 2017 10:15 AM CST   RETURN RETINA with Tiburcio Chacon MD   [facility] ([facility])    [address]  [address]  [address]  [facility] [address]   [phone]            Nov 27, 2017  1:00 PM CST   Return Visit with [facility] ([facility])    [address]  [address]  [facility] [address] [phone]            Nov 27, 2017  1:00 PM CST   Return Visit with Richardson Lopez [facility] ([facility])    [address]  [address]  [facility] [address] [phone]              Further instructions from your care team         Behavioral Discharge Planning and Instructions      Summary:  You were admitted on 11/6/2017  due to Depression.  You were treated by Dr. Wade Siddiqi MD and discharged on 11/10/17 from [facility] to [facility] Home.      Principal Diagnosis:   Schizoaffective disorder, depressed type, with acute exacerbation.     Health Care Follow-up Appointments:   Psychiatrist:  Hillary Winkelmann - Wednesday, November 15th at 2:00 pm  [facility]  [address] [place] [address]  [phone]     Therapist:  Liliana Miller - Tuesday, November 28th at 12:00 pm  [facility]  [address] [place], [place]   [phone]       Attend all scheduled appointments with your outpatient providers. Call at least 24 hours in advance if you need to reschedule an appointment to ensure continued access to your outpatient providers.   Major Treatments, Procedures and Findings:  You were provided with: a psychiatric assessment, assessed for medical stability, medication evaluation and/or management and milieu management    Symptoms to Report: feeling more aggressive, increased confusion, losing more sleep, mood getting worse or thoughts of suicide    Early warning signs can include: increased depression or anxiety sleep disturbances increased thoughts or behaviors of suicide or self-harm  increased unusual thinking, such as paranoia or hearing voices    Safety and Wellness:  Take all medicines as directed.  Make no changes unless your doctor suggests them.      Follow treatment recommendations.  Refrain from alcohol and non-prescribed drugs.  If there is a concern for safety, call 911.    Resources:   Crisis Intervention: 770.715.9652 or 924-286-4611 (TTY: 114.954.4980).  Call anytime for help.  National [place] on Mental Illness (www.[place].marah.org): 206.938.5405 or 154-158-3240.  Suicide Awareness Voices of Education (SAVE) (www.save.org): 208-083-ZLFM (9724)  National Suicide Prevention Line  (www.mentalhealthmn.org): 880-755-PURH (8255)  Mental Health Consumer/Survivor Network of [place] (www.mhcsn.net): 824.981.7848 or 576-577-3747  Mental Health Association of [place] (www.mentalhealth.org): 695.241.4899 or 311-779-7601  [facility] Crisis Response [phone]    The treatment team has appreciated the opportunity to work with you.     If you have any questions or concerns our unit number is [phone].      STOP TAKING THIS MEDICATION:  ESCITALOPRAM 10 MG DAILY (also known as Lexapro)        Pending Results     No orders found from 11/4/2017 to 11/7/2017.            Admission Information     Date & Time Provider Department Dept. Phone    11/6/2017 Wade Siddiqi MD [address] [phone]      Your Vitals Were     Blood Pressure Pulse Temperature Respirations Height Weight    149/54 76 97.3  F (36.3  C) (Tympanic) 16 1.524 m (5') 104.4 kg (230 lb 1.6 oz)    Last Period Pulse Oximetry BMI (Body Mass Index)             01/06/2015 98% 44.94 kg/m2         [employer] Information     The [employer] gives you secure access to your electronic health record. If you see a primary care provider, you can also send messages to your care team and make appointments. If you have questions, please call your primary care clinic.  If you do not have a primary care provider, please call [phone] and they will assist you.        Care EveryWhere ID     This is your Care EveryWhere ID. This could be used by other organizations to access your [place] medical records  [id number]        Equal Access to Services     RAMESH GARRIDO : Azul Rios, luis napier, lorena galvin. So [facility] [phone].    ATENCIÓN: Si habla español, tiene a trinh disposición servicios gratuitos de asistencia lingüística. Llame al [phone].    We comply with applicable federal civil rights laws and [place] laws. We do not discriminate on the basis of race, color, national origin,  age, disability, sex, sexual orientation, or gender identity.               Review of your medicines      CONTINUE these medicines which may have CHANGED, or have new prescriptions. If we are uncertain of the size of tablets/capsules you have at home, strength may be listed as something that might have changed.        Dose / Directions    citalopram 20 MG tablet   Commonly known as:  celeXA   This may have changed:    - medication strength  - how much to take   Used for:  Schizoaffective disorder, depressive type (H)        Dose:  20 mg   Take 1 tablet (20 mg) by mouth daily   Quantity:  30 tablet   Refills:  0       * haloperidol 5 MG tablet   Commonly known as:  HALDOL   This may have changed:  when to take this   Used for:  Schizoaffective disorder, depressive type (H)        Dose:  5 mg   Take 1 tablet (5 mg) by mouth 2 times daily   Quantity:  60 tablet   Refills:  0       * haloperidol 5 MG tablet   Commonly known as:  HALDOL   This may have changed:  Another medication with the same name was changed. Make sure you understand how and when to take each.   Used for:  Schizoaffective disorder, depressive type (H)        Dose:  5 mg   Take 1 tablet (5 mg) by mouth every 12 hours as needed for agitation   Quantity:  30 tablet   Refills:  0       insulin glargine 100 UNIT/ML injection   Commonly known as:  LANTUS   This may have changed:  how much to take   Used for:  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, with long-term current use of insulin, unspecified laterality (H)        Dose:  55 Units   Inject 55 Units Subcutaneous At Bedtime   Quantity:  3 mL   Refills:  3       * Notice:  This list has 2 medication(s) that are the same as other medications prescribed for you. Read the directions carefully, and ask your doctor or other care provider to review them with you.      CONTINUE these medicines which have NOT CHANGED        Dose / Directions    ACETAMINOPHEN PO         Dose:  1000 mg   Take 1,000 mg by mouth every 6 hours as needed for pain or fever   Refills:  0       albuterol 108 (90 BASE) MCG/ACT Inhaler   Commonly known as:  PROAIR HFA/PROVENTIL HFA/VENTOLIN HFA   Used for:  Dyspnea on exertion        Dose:  2 puff   Inhale 2 puffs into the lungs every 6 hours as needed for shortness of breath / dyspnea or wheezing   Quantity:  3 Inhaler   Refills:  1       aspirin 81 MG EC tablet   Commonly known as:  ASPIRIN LOW DOSE   Used for:  Coronary artery disease involving native heart with angina pectoris, unspecified vessel or lesion type (H)        Dose:  81 mg   Take 1 tablet (81 mg) by mouth daily   Quantity:  100 tablet   Refills:  4       atorvastatin 80 MG tablet   Commonly known as:  LIPITOR   Used for:  Hyperlipidemia LDL goal <100        TAKE 1 TABLET (80MG) BY MOUTH DAILY   Quantity:  28 tablet   Refills:  11       BENZTROPINE MESYLATE PO        Dose:  1 mg   Take 1 mg by mouth 2 times daily   Refills:  0       blood glucose monitoring lancets   Used for:  Type 2 diabetes mellitus with diabetic neuropathy, with long-term current use of insulin (H)        Use to test blood sugar 2 times daily or as directed.  Ok to substitute alternative if insurance prefers.   Quantity:  150 each   Refills:  11       blood glucose monitoring meter device kit   Used for:  Type 2 diabetes mellitus with diabetic neuropathy, with long-term current use of insulin (H)        Use to test blood sugar 2 times daily or as directed.  Ok to substitute alternative if insurance prefers.   Quantity:  1 kit   Refills:  0       chlorthalidone 25 MG tablet   Commonly known as:  HYGROTON   Used for:  HTN, goal below 140/90        Dose:  25 mg   Take 1 tablet (25 mg) by mouth daily   Quantity:  30 tablet   Refills:  3       gabapentin 300 MG capsule   Commonly known as:  NEURONTIN   Used for:  Type 2 diabetes mellitus with diabetic neuropathy, with long-term current use of insulin (H)        TAKE 2  CAPSULES (600MG) BY MOUTH THREE TIMES A DAY   Quantity:  168 capsule   Refills:  3       glucose 4 G Chew chewable tablet   Used for:  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, with long-term current use of insulin, unspecified laterality (H)        Take 2 every 15 minutes for blood sugar <70mg/dL. Recheck blood sugar every 15 minutes until above 70mg/dL, then eat a substantial meal.   Quantity:  20 tablet   Refills:  1       ibuprofen 600 MG tablet   Commonly known as:  ADVIL/MOTRIN   Used for:  Closed fracture of one rib of right side, initial encounter        Dose:  600 mg   Take 1 tablet (600 mg) by mouth every 4 hours as needed for moderate pain   Quantity:  60 tablet   Refills:  0       insulin aspart 100 UNIT/ML injection   Commonly known as:  NovoLOG FLEXPEN   Used for:  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, with long-term current use of insulin, unspecified laterality (H)        Dose:  20 Units   Inject 20 Units Subcutaneous 3 times daily (with meals) Once daily, can add additional 5 units if BGs are >500mg/dL.   Quantity:  15 mL   Refills:  3       losartan 100 MG tablet   Commonly known as:  COZAAR   Used for:  Coronary artery disease involving native heart with angina pectoris, unspecified vessel or lesion type (H)        TAKE 1 TABLET (100MG) BY MOUTH DAILY   Quantity:  28 tablet   Refills:  3       melatonin 5 MG Caps   Used for:  Insomnia, unspecified type        Dose:  1 capsule   Take 1 capsule by mouth daily At dinnertime   Quantity:  90 capsule   Refills:  1       metoprolol 100 MG 24 hr tablet   Commonly known as:  TOPROL-XL   Used for:  Coronary artery disease involving native heart with angina pectoris, unspecified vessel or lesion type (H)        TAKE 1 TABLET (100MG) BY MOUTH DAILY   Quantity:  28 tablet   Refills:  11       * ONETOUCH ULTRA test strip   Used for:  Type 2 diabetes mellitus with  diabetic neuropathy, with long-term current use of insulin (H)   Generic drug:  blood glucose monitoring        TEST TWICE DAILY AS DIRECTED   Quantity:  150 strip   Refills:  8       * blood glucose monitoring test strip   Commonly known as:  ONETOUCH VERIO IQ   Used for:  Type 2 diabetes mellitus with diabetic neuropathy, with long-term current use of insulin (H)        Use to test blood sugar 2 times daily or as directed.  Ok to substitute alternative if insurance prefers.   Quantity:  150 strip   Refills:  11       * order for DME   Used for:  Falls frequently        Equipment being ordered: Rolling walker   Quantity:  1 Device   Refills:  0       * order for DME   Used for:  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, with long-term current use of insulin, unspecified laterality (H)        Hold Novolog if meals are refused.   Quantity:  1 each   Refills:  0       * order for DME   Used for:  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, with long-term current use of insulin, unspecified laterality (H)        Diabetic Shoes   Quantity:  2 each   Refills:  0       polyethylene glycol powder   Commonly known as:  MIRALAX/GLYCOLAX   Used for:  Constipation, unspecified constipation type        TAKE 17 GRAMS (1 CAPFUL) BY MOUTH DAILY   Quantity:  527 g   Refills:  3       ranitidine 300 MG tablet   Commonly known as:  ZANTAC   Used for:  Gastroesophageal reflux disease without esophagitis        TAKE 1 TABLET (300MG) BY MOUTH AT BEDTIME   Quantity:  28 tablet   Refills:  3       senna-docusate 8.6-50 MG per tablet   Commonly known as:  SENOKOT-S;PERICOLACE        Dose:  1 tablet   Take 1 tablet by mouth 2 times daily as needed for constipation   Refills:  0       TRULICITY 1.5 MG/0.5ML pen   Used for:  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, with long-term current use of insulin,  unspecified laterality (H)   Generic drug:  dulaglutide        INJECT 1.5MG SUBCUTANEOUSLY EVERY SEVEN DAYS   Quantity:  2 mL   Refills:  11       ULTICARE MINI 31G X 6 MM   Used for:  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, with long-term current use of insulin, unspecified laterality (H)   Generic drug:  insulin pen needle        USE 4 DAILY OR AS DIRECTED   Quantity:  100 each   Refills:  11       vitamin D3 95733 UNITS capsule   Commonly known as:  CHOLECALCIFEROL   Used for:  Vitamin D deficiency        TAKE 1 CAPSULE (50,000 UNITS) BY MOUTH ONCE A WEEK   Quantity:  4 capsule   Refills:  3       * Notice:  This list has 5 medication(s) that are the same as other medications prescribed for you. Read the directions carefully, and ask your doctor or other care provider to review them with you.         Where to get your medicines      These medications were sent to [facility] [place], [place] - [address]  [address] [facility] [address], [facility] [address]     Phone:  [phone]     citalopram 20 MG tablet    haloperidol 5 MG tablet    haloperidol 5 MG tablet    insulin glargine 100 UNIT/ML injection                Protect others around you: Learn how to safely use, store and throw away your medicines at www.disposemymeds.org.             Medication List: This is a list of all your medications and when to take them. Check marks below indicate your daily home schedule. Keep this list as a reference.      Medications           Morning Afternoon Evening Bedtime As Needed    ACETAMINOPHEN PO   Take 1,000 mg by mouth every 6 hours as needed for pain or fever                                albuterol 108 (90 BASE) MCG/ACT Inhaler   Commonly known as:  PROAIR HFA/PROVENTIL HFA/VENTOLIN HFA   Inhale 2 puffs into the lungs every 6 hours as needed for shortness of breath / dyspnea or wheezing                                aspirin 81 MG EC tablet   Commonly  known as:  ASPIRIN LOW DOSE   Take 1 tablet (81 mg) by mouth daily   Last time this was given:  81 mg on 11/10/2017  8:51 AM                                atorvastatin 80 MG tablet   Commonly known as:  LIPITOR   TAKE 1 TABLET (80MG) BY MOUTH DAILY   Last time this was given:  80 mg on 11/10/2017  8:51 AM                                BENZTROPINE MESYLATE PO   Take 1 mg by mouth 2 times daily   Last time this was given:  1 mg on 11/10/2017  8:51 AM                                blood glucose monitoring lancets   Use to test blood sugar 2 times daily or as directed.  Ok to substitute alternative if insurance prefers.                                blood glucose monitoring meter device kit   Use to test blood sugar 2 times daily or as directed.  Ok to substitute alternative if insurance prefers.                                chlorthalidone 25 MG tablet   Commonly known as:  HYGROTON   Take 1 tablet (25 mg) by mouth daily   Last time this was given:  25 mg on 11/10/2017  8:51 AM                                citalopram 20 MG tablet   Commonly known as:  celeXA   Take 1 tablet (20 mg) by mouth daily   Last time this was given:  20 mg on 11/10/2017  8:50 AM                                gabapentin 300 MG capsule   Commonly known as:  NEURONTIN   TAKE 2 CAPSULES (600MG) BY MOUTH THREE TIMES A DAY   Last time this was given:  600 mg on 11/10/2017  2:31 PM                                glucose 4 G Chew chewable tablet   Take 2 every 15 minutes for blood sugar <70mg/dL. Recheck blood sugar every 15 minutes until above 70mg/dL, then eat a substantial meal.                                * haloperidol 5 MG tablet   Commonly known as:  HALDOL   Take 1 tablet (5 mg) by mouth 2 times daily   Last time this was given:  5 mg on 11/10/2017  8:51 AM                                * haloperidol 5 MG tablet   Commonly known as:  HALDOL   Take 1 tablet (5 mg) by mouth every 12 hours as needed for agitation   Last time this was  given:  5 mg on 11/10/2017  8:51 AM                                ibuprofen 600 MG tablet   Commonly known as:  ADVIL/MOTRIN   Take 1 tablet (600 mg) by mouth every 4 hours as needed for moderate pain   Last time this was given:  600 mg on 11/10/2017  2:31 PM                                insulin aspart 100 UNIT/ML injection   Commonly known as:  NovoLOG FLEXPEN   Inject 20 Units Subcutaneous 3 times daily (with meals) Once daily, can add additional 5 units if BGs are >500mg/dL.   Last time this was given:  3 Units on 11/10/2017 12:33 PM                                insulin glargine 100 UNIT/ML injection   Commonly known as:  LANTUS   Inject 55 Units Subcutaneous At Bedtime   Last time this was given:  45 Units on 11/9/2017  8:52 PM                                losartan 100 MG tablet   Commonly known as:  COZAAR   TAKE 1 TABLET (100MG) BY MOUTH DAILY   Last time this was given:  50 mg on 11/10/2017  8:51 AM                                melatonin 5 MG Caps   Take 1 capsule by mouth daily At dinnertime                                metoprolol 100 MG 24 hr tablet   Commonly known as:  TOPROL-XL   TAKE 1 TABLET (100MG) BY MOUTH DAILY   Last time this was given:  100 mg on 11/10/2017  8:51 AM                                * ONETOUCH ULTRA test strip   TEST TWICE DAILY AS DIRECTED   Generic drug:  blood glucose monitoring                                * blood glucose monitoring test strip   Commonly known as:  ONETOUCH VERIO IQ   Use to test blood sugar 2 times daily or as directed.  Ok to substitute alternative if insurance prefers.                                * order for DME   Equipment being ordered: Rolling walker                                * order for DME   Hold Novolog if meals are refused.                                * order for DME   Diabetic Shoes                                polyethylene glycol powder   Commonly known as:  MIRALAX/GLYCOLAX   TAKE 17 GRAMS (1 CAPFUL) BY MOUTH DAILY                                 ranitidine 300 MG tablet   Commonly known as:  ZANTAC   TAKE 1 TABLET (300MG) BY MOUTH AT BEDTIME   Last time this was given:  300 mg on 11/9/2017  8:48 PM                                senna-docusate 8.6-50 MG per tablet   Commonly known as:  SENOKOT-S;PERICOLACE   Take 1 tablet by mouth 2 times daily as needed for constipation                                TRULICITY 1.5 MG/0.5ML pen   INJECT 1.5MG SUBCUTANEOUSLY EVERY SEVEN DAYS   Generic drug:  dulaglutide                                ULTICARE MINI 31G X 6 MM   USE 4 DAILY OR AS DIRECTED   Generic drug:  insulin pen needle                                vitamin D3 67190 UNITS capsule   Commonly known as:  CHOLECALCIFEROL   TAKE 1 CAPSULE (50,000 UNITS) BY MOUTH ONCE A WEEK                                * Notice:  This list has 7 medication(s) that are the same as other medications prescribed for you. Read the directions carefully, and ask your doctor or other care provider to review them with you.

## 2017-11-06 NOTE — IP AVS SNAPSHOT
[facility]    [address]    [place] [address]    Phone:  [phone]                                       After Visit Summary   11/6/2017    Nena Tang    MRN: [id number]           After Visit Summary Signature Page     I have received my discharge instructions, and my questions have been answered. I have discussed any challenges I see with this plan with the nurse or doctor.    ..........................................................................................................................................  Patient/Patient Representative Signature      ..........................................................................................................................................  Patient Representative Print Name and Relationship to Patient    ..................................................               ................................................  Date                                            Time    ..........................................................................................................................................  Reviewed by Signature/Title    ...................................................              ..............................................  Date                                                            Time

## 2017-11-07 LAB
ALBUMIN SERPL-MCNC: 3.5 G/DL (ref 3.4–5)
ALBUMIN UR-MCNC: NEGATIVE MG/DL
ALP SERPL-CCNC: 114 U/L (ref 40–150)
ALT SERPL W P-5'-P-CCNC: 23 U/L (ref 0–50)
AMPHETAMINES UR QL SCN: NEGATIVE
ANION GAP SERPL CALCULATED.3IONS-SCNC: 7 MMOL/L (ref 3–14)
APPEARANCE UR: CLEAR
AST SERPL W P-5'-P-CCNC: 13 U/L (ref 0–45)
BARBITURATES UR QL: NEGATIVE
BASOPHILS # BLD AUTO: 0 10E9/L (ref 0–0.2)
BASOPHILS NFR BLD AUTO: 0.3 %
BENZODIAZ UR QL: NEGATIVE
BILIRUB SERPL-MCNC: 0.4 MG/DL (ref 0.2–1.3)
BILIRUB UR QL STRIP: NEGATIVE
BUN SERPL-MCNC: 17 MG/DL (ref 7–30)
CALCIUM SERPL-MCNC: 9.5 MG/DL (ref 8.5–10.1)
CANNABINOIDS UR QL SCN: NEGATIVE
CHLORIDE SERPL-SCNC: 104 MMOL/L (ref 94–109)
CO2 SERPL-SCNC: 28 MMOL/L (ref 20–32)
COCAINE UR QL: NEGATIVE
COLOR UR AUTO: ABNORMAL
CREAT SERPL-MCNC: 0.88 MG/DL (ref 0.52–1.04)
DEPRECATED CALCIDIOL+CALCIFEROL SERPL-MC: 69 UG/L (ref 20–75)
DIFFERENTIAL METHOD BLD: ABNORMAL
EOSINOPHIL # BLD AUTO: 0.1 10E9/L (ref 0–0.7)
EOSINOPHIL NFR BLD AUTO: 1.7 %
ERYTHROCYTE [DISTWIDTH] IN BLOOD BY AUTOMATED COUNT: 13 % (ref 10–15)
ETHANOL UR QL SCN: NEGATIVE
FOLATE SERPL-MCNC: 10.7 NG/ML
GFR SERPL CREATININE-BSD FRML MDRD: 66 ML/MIN/1.7M2
GLUCOSE BLDC GLUCOMTR-MCNC: 140 MG/DL (ref 70–99)
GLUCOSE BLDC GLUCOMTR-MCNC: 144 MG/DL (ref 70–99)
GLUCOSE BLDC GLUCOMTR-MCNC: 236 MG/DL (ref 70–99)
GLUCOSE BLDC GLUCOMTR-MCNC: 279 MG/DL (ref 70–99)
GLUCOSE BLDC GLUCOMTR-MCNC: 280 MG/DL (ref 70–99)
GLUCOSE SERPL-MCNC: 142 MG/DL (ref 70–99)
GLUCOSE UR STRIP-MCNC: 70 MG/DL
HBA1C MFR BLD: 8.9 % (ref 4.3–6)
HCT VFR BLD AUTO: 32.8 % (ref 35–47)
HGB BLD-MCNC: 10.8 G/DL (ref 11.7–15.7)
HGB UR QL STRIP: NEGATIVE
HYALINE CASTS #/AREA URNS LPF: 1 /LPF (ref 0–2)
IMM GRANULOCYTES # BLD: 0 10E9/L (ref 0–0.4)
IMM GRANULOCYTES NFR BLD: 0.3 %
KETONES UR STRIP-MCNC: NEGATIVE MG/DL
LEUKOCYTE ESTERASE UR QL STRIP: ABNORMAL
LYMPHOCYTES # BLD AUTO: 2.6 10E9/L (ref 0.8–5.3)
LYMPHOCYTES NFR BLD AUTO: 45 %
MCH RBC QN AUTO: 30.9 PG (ref 26.5–33)
MCHC RBC AUTO-ENTMCNC: 32.9 G/DL (ref 31.5–36.5)
MCV RBC AUTO: 94 FL (ref 78–100)
MONOCYTES # BLD AUTO: 0.4 10E9/L (ref 0–1.3)
MONOCYTES NFR BLD AUTO: 7.7 %
NEUTROPHILS # BLD AUTO: 2.6 10E9/L (ref 1.6–8.3)
NEUTROPHILS NFR BLD AUTO: 45 %
NITRATE UR QL: NEGATIVE
NRBC # BLD AUTO: 0 10*3/UL
NRBC BLD AUTO-RTO: 0 /100
OPIATES UR QL SCN: NEGATIVE
PH UR STRIP: 6 PH (ref 5–7)
PLATELET # BLD AUTO: 209 10E9/L (ref 150–450)
POTASSIUM SERPL-SCNC: 3.9 MMOL/L (ref 3.4–5.3)
PROT SERPL-MCNC: 7.8 G/DL (ref 6.8–8.8)
RBC # BLD AUTO: 3.49 10E12/L (ref 3.8–5.2)
RBC #/AREA URNS AUTO: <1 /HPF (ref 0–2)
SODIUM SERPL-SCNC: 139 MMOL/L (ref 133–144)
SOURCE: ABNORMAL
SP GR UR STRIP: 1.01 (ref 1–1.03)
SQUAMOUS #/AREA URNS AUTO: 1 /HPF (ref 0–1)
TSH SERPL DL<=0.005 MIU/L-ACNC: 3.21 MU/L (ref 0.4–4)
UROBILINOGEN UR STRIP-MCNC: NORMAL MG/DL (ref 0–2)
VIT B12 SERPL-MCNC: 308 PG/ML (ref 193–986)
WBC # BLD AUTO: 5.8 10E9/L (ref 4–11)
WBC #/AREA URNS AUTO: 5 /HPF (ref 0–2)

## 2017-11-07 PROCEDURE — 82746 ASSAY OF FOLIC ACID SERUM: CPT | Performed by: CLINICAL NURSE SPECIALIST

## 2017-11-07 PROCEDURE — 99207 ZZC CDG-HISTORY COMP: MEETS EXP. PROBLEM FOCUSED-DOWN CODED LACK OF ROS: CPT | Performed by: PSYCHIATRY & NEUROLOGY

## 2017-11-07 PROCEDURE — 99232 SBSQ HOSP IP/OBS MODERATE 35: CPT | Performed by: PHYSICIAN ASSISTANT

## 2017-11-07 PROCEDURE — 99207 ZZC CONSULT E&M CHANGED TO SUBSEQUENT LEVEL: CPT | Performed by: PHYSICIAN ASSISTANT

## 2017-11-07 PROCEDURE — 25000131 ZZH RX MED GY IP 250 OP 636 PS 637: Mod: GY | Performed by: PSYCHIATRY & NEUROLOGY

## 2017-11-07 PROCEDURE — 80307 DRUG TEST PRSMV CHEM ANLYZR: CPT | Performed by: PSYCHIATRY & NEUROLOGY

## 2017-11-07 PROCEDURE — 36415 COLL VENOUS BLD VENIPUNCTURE: CPT | Performed by: CLINICAL NURSE SPECIALIST

## 2017-11-07 PROCEDURE — 99221 1ST HOSP IP/OBS SF/LOW 40: CPT | Mod: AI | Performed by: PSYCHIATRY & NEUROLOGY

## 2017-11-07 PROCEDURE — 25000132 ZZH RX MED GY IP 250 OP 250 PS 637: Mod: GY | Performed by: PSYCHIATRY & NEUROLOGY

## 2017-11-07 PROCEDURE — A9270 NON-COVERED ITEM OR SERVICE: HCPCS | Mod: GY | Performed by: PSYCHIATRY & NEUROLOGY

## 2017-11-07 PROCEDURE — 25000131 ZZH RX MED GY IP 250 OP 636 PS 637: Mod: GY | Performed by: PHYSICIAN ASSISTANT

## 2017-11-07 PROCEDURE — 84443 ASSAY THYROID STIM HORMONE: CPT | Performed by: CLINICAL NURSE SPECIALIST

## 2017-11-07 PROCEDURE — 80053 COMPREHEN METABOLIC PANEL: CPT | Performed by: CLINICAL NURSE SPECIALIST

## 2017-11-07 PROCEDURE — 85025 COMPLETE CBC W/AUTO DIFF WBC: CPT | Performed by: CLINICAL NURSE SPECIALIST

## 2017-11-07 PROCEDURE — 82607 VITAMIN B-12: CPT | Performed by: CLINICAL NURSE SPECIALIST

## 2017-11-07 PROCEDURE — 97150 GROUP THERAPEUTIC PROCEDURES: CPT | Mod: GO

## 2017-11-07 PROCEDURE — 00000146 ZZHCL STATISTIC GLUCOSE BY METER IP

## 2017-11-07 PROCEDURE — 80320 DRUG SCREEN QUANTALCOHOLS: CPT | Performed by: PSYCHIATRY & NEUROLOGY

## 2017-11-07 PROCEDURE — 81001 URINALYSIS AUTO W/SCOPE: CPT | Performed by: PSYCHIATRY & NEUROLOGY

## 2017-11-07 PROCEDURE — 83036 HEMOGLOBIN GLYCOSYLATED A1C: CPT | Performed by: CLINICAL NURSE SPECIALIST

## 2017-11-07 PROCEDURE — 82306 VITAMIN D 25 HYDROXY: CPT | Performed by: CLINICAL NURSE SPECIALIST

## 2017-11-07 PROCEDURE — 12400007 ZZH R&B MH INTERMEDIATE UMMC

## 2017-11-07 PROCEDURE — H2032 ACTIVITY THERAPY, PER 15 MIN: HCPCS

## 2017-11-07 RX ORDER — POLYETHYLENE GLYCOL 3350 17 G/17G
17 POWDER, FOR SOLUTION ORAL DAILY
Status: DISCONTINUED | OUTPATIENT
Start: 2017-11-08 | End: 2017-11-10 | Stop reason: HOSPADM

## 2017-11-07 RX ORDER — CITALOPRAM HYDROBROMIDE 20 MG/1
20 TABLET ORAL DAILY
Status: DISCONTINUED | OUTPATIENT
Start: 2017-11-08 | End: 2017-11-10 | Stop reason: HOSPADM

## 2017-11-07 RX ORDER — HALOPERIDOL 5 MG/1
5 TABLET ORAL 2 TIMES DAILY
Status: DISCONTINUED | OUTPATIENT
Start: 2017-11-07 | End: 2017-11-10 | Stop reason: HOSPADM

## 2017-11-07 RX ADMIN — INSULIN GLARGINE 45 UNITS: 100 INJECTION, SOLUTION SUBCUTANEOUS at 21:44

## 2017-11-07 RX ADMIN — HALOPERIDOL 5 MG: 5 TABLET ORAL at 19:48

## 2017-11-07 RX ADMIN — ASPIRIN 81 MG: 81 TABLET, COATED ORAL at 08:33

## 2017-11-07 RX ADMIN — GABAPENTIN 600 MG: 300 CAPSULE ORAL at 00:16

## 2017-11-07 RX ADMIN — Medication 5 MG: at 00:16

## 2017-11-07 RX ADMIN — GABAPENTIN 600 MG: 300 CAPSULE ORAL at 13:04

## 2017-11-07 RX ADMIN — INSULIN ASPART 20 UNITS: 100 INJECTION, SOLUTION INTRAVENOUS; SUBCUTANEOUS at 08:46

## 2017-11-07 RX ADMIN — CITALOPRAM HYDROBROMIDE 15 MG: 10 TABLET ORAL at 08:34

## 2017-11-07 RX ADMIN — INSULIN ASPART 3 UNITS: 100 INJECTION, SOLUTION INTRAVENOUS; SUBCUTANEOUS at 17:38

## 2017-11-07 RX ADMIN — BENZTROPINE MESYLATE 1 MG: 0.5 TABLET ORAL at 19:48

## 2017-11-07 RX ADMIN — HYDROXYZINE HYDROCHLORIDE 50 MG: 25 TABLET ORAL at 01:57

## 2017-11-07 RX ADMIN — Medication 5 MG: at 17:38

## 2017-11-07 RX ADMIN — GABAPENTIN 600 MG: 300 CAPSULE ORAL at 19:48

## 2017-11-07 RX ADMIN — BENZTROPINE MESYLATE 1 MG: 0.5 TABLET ORAL at 08:33

## 2017-11-07 RX ADMIN — LOSARTAN POTASSIUM 100 MG: 100 TABLET, FILM COATED ORAL at 08:33

## 2017-11-07 RX ADMIN — INSULIN ASPART 2 UNITS: 100 INJECTION, SOLUTION INTRAVENOUS; SUBCUTANEOUS at 13:05

## 2017-11-07 RX ADMIN — METOPROLOL SUCCINATE 100 MG: 25 TABLET, EXTENDED RELEASE ORAL at 08:49

## 2017-11-07 RX ADMIN — ATORVASTATIN CALCIUM 80 MG: 40 TABLET, FILM COATED ORAL at 08:33

## 2017-11-07 RX ADMIN — HYDROXYZINE HYDROCHLORIDE 50 MG: 25 TABLET ORAL at 21:43

## 2017-11-07 RX ADMIN — RANITIDINE 300 MG: 150 TABLET ORAL at 21:43

## 2017-11-07 RX ADMIN — GABAPENTIN 600 MG: 300 CAPSULE ORAL at 08:34

## 2017-11-07 RX ADMIN — INSULIN GLARGINE 45 UNITS: 100 INJECTION, SOLUTION SUBCUTANEOUS at 00:35

## 2017-11-07 RX ADMIN — CHLORTHALIDONE 25 MG: 25 TABLET ORAL at 08:33

## 2017-11-07 RX ADMIN — RANITIDINE 300 MG: 150 TABLET ORAL at 00:16

## 2017-11-07 RX ADMIN — HALOPERIDOL 5 MG: 5 TABLET ORAL at 00:16

## 2017-11-07 RX ADMIN — BENZTROPINE MESYLATE 1 MG: 0.5 TABLET ORAL at 00:17

## 2017-11-07 ASSESSMENT — ACTIVITIES OF DAILY LIVING (ADL)
DRESS: SCRUBS (BEHAVIORAL HEALTH);INDEPENDENT
ORAL_HYGIENE: INDEPENDENT
GROOMING: INDEPENDENT
ORAL_HYGIENE: INDEPENDENT
DRESS: INDEPENDENT
GROOMING: INDEPENDENT

## 2017-11-07 NOTE — PROGRESS NOTES
"SPIRITUAL HEALTH SERVICES  SPIRITUAL ASSESSMENT Progress Note  [facility]     REFERRAL SOURCE:  initiated    Met with Nena to offer spiritual and emotional support.  I gave a brief overview of spiritual health and our resources and Nena said she is \"getting through\" and is \"doing what [she] has to do.\"  Nena feels supported by her \"Judaism community\" which is a \"[employer] in [place].\"  She finds connection in \"preaching, music and prayer.\"  At the end of our visit I offered prayer but Nena had to \"take care of something.\" She said she would \"let me know\" if she wants another visit.     PLAN: spiritual health remains available upon pt's request     Mica Baird  Chaplain Resident  Pager [phone]    "

## 2017-11-07 NOTE — PROGRESS NOTES
"Pt reports audio and visual hallucinations at night - a man dressed in black with glowing eyes telling pt to \"kill herself.\"  Pt uses CPAP at night and has her cpap from home with her.  Pt placed on 1:1 at night when using CPAP.  Pt placed on fall precautions, reports two falls within the past 6 months.  Pt placed on suicide precautions.      Writer spoke to Alva at [facility] ([phone]), the group home where pt resides.  She confirmed PTA meds.  Trulicity weekly injection and Vitamin D3 weekly dose both due 11/7/17.          "

## 2017-11-07 NOTE — PLAN OF CARE
Problem: General Plan of Care (Inpatient Behavioral)  Goal: Individualization/Patient Specific Goal (IP Behavioral)  The patient and/or their representative will achieve their patient-specific goals related to the plan of care.    The patient-specific goals include:  Illness Management Recovery model: Objectives    Patient will identify reason(s) for hospitalization from their perspective.  Patient will identify a minimum of three goals for discharge.  Patient will identify a minimum of three triggers that may increase their symptoms.  Patient will identify a minimum of three coping skills they can do to stay well.   Patient will identify their support system to demonstrate readiness for discharge.   Reasons you are in the hospital:  1. Suicidal ideation, thoughts about self-harm.  2. Seeing a man who comes when she is alone in the dark. He gets stronger around the death anniversary of loved ones. She calls him a shadow with a black cape with glowing red or yellow eyes.   3. It is the anniversary of my 's death.     Goals for discharge:  1. Wants to get ride of the ghost (the man who she sees) in her room.   2. Take medications.   3. Go to groups.

## 2017-11-07 NOTE — PROGRESS NOTES
11/06/17 6633   Patient Belongings   Did you bring any home meds/supplements to the hospital?  No   Patient Belongings other (see comments)   Belongings Search Yes   Clothing Search Yes   Second Staff Drew Rubi    General Info Comment See Notes     Lockgiulia Acharya Jacket, Tennis shoes,broken screen cell phone, , cane   With Patient  2 T-shirts, Jeans, bra  To security Env # [id number]  1 Master Card    A               Admission:  I am responsible for any personal items that are not sent to the safe or pharmacy.  [facility] is not responsible for loss, theft or damage of any property in my possession.    Signature:  _________________________________ Date: _______  Time: _____                                              Staff Signature:  ____________________________ Date: ________  Time: _____      2nd Staff person, if patient is unable/unwilling to sign:    Signature: ________________________________ Date: ________  Time: _____     Discharge:  [facility] has returned all of my personal belongings:    Signature: _________________________________ Date: ________  Time: _____                                          Staff Signature:  ____________________________ Date: ________  Time: _____

## 2017-11-07 NOTE — PROGRESS NOTES
58 y/o with long hx of depression. It is anniversary of 's death and she has had suicide thoughts of using scissors on herself. Since his death, 1.5 years ago, she has moved to a group home. She lives with 3 other women and likes living there. Hx CD tx and denies current use of ETOH and drugs. Denies active SI at time of interview, She has auditory hallucinations telling her to kill herself. Hx: Schizoaffective disorder. IDDM. Fall risk; using 4 prong cane at home but states she can walk without it and declined use of a walker. Uses Cpap for sleep and states it does help her to sleep better. Plan SIO when she has Cpap at night only.

## 2017-11-07 NOTE — PROGRESS NOTES
Pt observed on SIO status for SI and using a CPAP.  Her HS meds and Lantus were available and given at 0017. She asked the SIO staff if they could see a man in her room.  She has been having A/V hallucinations during the night of a man dressed in black with glowing eyes.  Pt is compliant with taking her meds.  She had difficulty getting to sleep and was given Hydroxyzine 50 mg at 0157.  Her BG was 140 during the night.  She went to sleep at 0330.    0615 Pt slept 3 hours.

## 2017-11-07 NOTE — PROGRESS NOTES
[facility] intern spoke with Zheng, [facility] provider. Zheng said that pt's baseline is daily dealing with A/V hallucinations of the man in black who comes to her room. She leaves her light on at night to avoid seeing him. She typically does not talk about him much but over the past couple of weeks she has been talking about it more. She has been more distressed the last couple of weeks.    Zheng also said that pt sleeps a lot during the day and then stays awake at night. She makes no effort to stay awake during they day.    She had an appointment with psychiatrist a month or 2 ago where her haldol was decreased from 10mg to 5mg due to leg and mouth movements. Her PCP has recently prescribed PRN haldol 5mg BID. She saw her psychiatrist again this week but he was not sure what the medication changes were but thinks they increased her lexapro. He had concerns she was not sent with correct medications to inpatient unit 3BW.

## 2017-11-07 NOTE — PLAN OF CARE
Problem: Depressive Symptoms  Goal: Depressive Symptoms  Signs and symptoms of listed problems will be absent or manageable.   1. Pt will report that she no longer has suicidal thoughts.  2. Pt will attend 75% of unit programming.   3. Pt will contribute to aftercare/discharge planning.      Pt attended 1/2 OT groups. She was independent with decision making. She was bright on approach with smiles. She stated she can read but can t write. She requested the author for assistance for writing answers on the OT goal sheet. She identified the reason for admission as  suicidal thoughts  and her goals as to improve self esteem, express feelings better, manage time better, and to problem solve independently. Pt was given and completed a written self assessment. OT staff oriented pt to OT program, discussed the value of being involved in their treatment plan, and provided options to meet their current needs/self identified goals. Plan: encourage group attendance, provide opportunities to identify and build coping strategies and for successful task completion to improve self-esteem and concentration. Provide structures to improve problem solving skills. OT Initial Eval will be completed with further attendance.

## 2017-11-07 NOTE — PLAN OF CARE
"Problem: General Plan of Care (Inpatient Behavioral)  Goal: Individualization/Patient Specific Goal (IP Behavioral)  The patient and/or their representative will achieve their patient-specific goals related to the plan of care.    The patient-specific goals include:  Illness Management Recovery model: Objectives    Patient will identify reason(s) for hospitalization from their perspective.  Patient will identify a minimum of three goals for discharge.  Patient will identify a minimum of three triggers that may increase their symptoms.  Patient will identify a minimum of three coping skills they can do to stay well.   Patient will identify their support system to demonstrate readiness for discharge.   Outcome: Kaley Barrett was out in the lounge most of the shift. Attended all groups. Calm, flat, cooperative, pleasant upon approach, did not engage peers or staff unless first engaged and largely kept to herself.      She reports having AH for \"a long time\" but it has been much worse in the last 3 days. \"I see a man coming into my room. Im having a hard time sleeping because of this.\" She does not know of anything that has happened in the last 3 days that might be attributed to her increase in AH but it has frightened her.      She reports an improvement in her mood overall, denying SI at present.       "

## 2017-11-07 NOTE — ED NOTES
Introduced self to pt.  Pt given sandwich and beverage.  Pt is diabetic, glucose at 1800 was 160.  No additional needs at this time.  Pt is watching TV, waiting for assessment.

## 2017-11-07 NOTE — PLAN OF CARE
"Problem: General Plan of Care (Inpatient Behavioral)  Goal: Individualization/Patient Specific Goal (IP Behavioral)  The patient and/or their representative will achieve their patient-specific goals related to the plan of care.    The patient-specific goals include:  Illness Management Recovery model: Objectives    Patient will identify reason(s) for hospitalization from their perspective.  Patient will identify a minimum of three goals for discharge.  Patient will identify a minimum of three triggers that may increase their symptoms.  Patient will identify a minimum of three coping skills they can do to stay well.   Patient will identify their support system to demonstrate readiness for discharge.   Illness Management Recovery model: Objectives     Patient will identify reason(s) for hospitalization from their perspective.  Patient will identify a minimum of three goals for discharge.  Patient will identify a minimum of three triggers that may increase their symptoms.  Patient will identify a minimum of three coping skills they can do to stay well.  Patient will identify their support system to demonstrate readiness for discharge.     Illness Management Recovery model:  Triggers.      Patient identified the following triggers which may exacerbate their illness:     1. \"When I think about my  who passed away two years ago\".  2. \"When I think so many things\"  3.           "

## 2017-11-07 NOTE — CONSULTS
"    Internal Medicine Initial Visit      Nena Tang MRN# [id number]   YOB: 1960 Age: 57 year old   Date of Admission: 11/6/2017  PCP: Rowena Haas    Referring Provider: Behavioral Health - Wade Siddiqi MD  Reason for Visit: General Medical Evaluation, Diabetes Mellitus          Assessment and Recommendations:   Nena Tang is a 57 year old year old woman with a history of schizoaffective disorder, DM II, HTN, HLD, CINDY, CAD, GERD, and uterine CA s/p hysterectomy (1980s) who is admitted to station [facility] with suicidal ideation and auditory/visual hallucinations.        #Decompensated Schizoaffective Disorder. Management per psychiatry team.     #DM II. Not optimally controlled w/ hgbA1c 8.9%. BG 140s this AM, 236 before lunch. States 200s are normal for her. Is compliant w/ OP insulin regimen.   - Continue home Lantus of 45 units nightly.   - Continue home Novolog of 20 units TID WM.   - Add Novolog \"medium\" sliding scale for high BG.   - Anticipate that we may need to make some insulin adjustments while here.   - Also on Trulicity but ok to hold here.     #HTN. /71 on admit w/ orthostatic drop to 111/53. Today is 121/66-->100/54. Denies sx. HR 60s-80s.   - Ok to continue home losartan, HCTZ, metoprolol. Added holding parameters. Will chart check for continued orthostatic change but please call if symptomatic.     #CINDY. Continue home CPAP.     #GERD. Continue home ranitidine.     #Chronic Constipation. Remains regular w/ Miralax (ordered).     #Chronic Normocytic Anemia. Hgb 10.8, improved from 8s previously. No recent iron studies but on prior ones was not deficient. Vit B12 308. Folate wnl. No further IP w/u needed.     #CAD. Had an MI at the time of her mother's death 5-6 years ago, followed by stress (Takotsubo) cardiomyopathy. Cath from 2014 w/ 2v CAD but no flow limiting lesions; minor narrowing of RCA/LCx. Stress test 2015 w/ no perfusion defect. Last echo 8/2017 w/ LVEF " "60-65%, RV systolic pressure elevated c/w mild pulm HTN. Asymptomatic currently.   - Continue home ASA and statin.     Medicine will follow blood sugars and BPs, please page with any additional concerns.     Sophie Saldivar PA-C  Hospitalist Service   Pager: [phone]     Reason for Admission:   Suicidal Ideation          History of Present Illness:   History is obtained from the patient and medical record.     Nena is a 57 year old woman with a history of schizoaffective disorder who is admitted with suicidal ideation and auditory/visual hallucinations involving a man in her room who by her report appears \"ghost-like\" and tells her to harm herself.     Internal Medicine service was asked to see patient for a general medication evaluation and assessment of patient's diabetes. Currently, Nena reports feeling ok. She reports that her diabetes is not well controlled at times with blood sugars in the 200s. She is surprised to see herself in the 100s here. Appetite is normal. Feels that her GERD and HTN are well controlled with medications, and is compliant with her CPAP for sleep apnea. Denies cough, chest pain, SOB, LE edema. Denies bowel or bladder complaints.           Review of Systems:   10 point ROS performed and negative unless otherwise noted in HPI.           Past Medical History:   Reviewed and updated in Epic.  Past Medical History:   Diagnosis Date     CAD (coronary artery disease)     5/2014 cath, nonbostructive stenosis to LAD, RCA.     Chronic low back pain 1/22/2013     Cocaine abuse, in remission      Fecal urgency 3/8/2012     History of heroin abuse      Hyperlipidemia LDL goal <100 10/31/2010     Hypertension 7/29/2013     Illiterate 8/30/2011     Irritable bowel syndrome      Left cataract      Migraine 4/19/2012     Migraine headache 4/22/2013     Moderate major depression (H) 6/8/2011     Noncompliance with medication regimen 6/8/2011     Obesity      CINDY (obstructive sleep apnea) 3/8/2012 "    uses CPAP     Osteopenia 10/7/2009     Schizoaffective disorder, depressive type (H) 2/25/2013     Suicidal intent 10/2/2013     Takotsubo cardiomyopathy      Type 2 diabetes mellitus (H) 8/30/2011     Uterine cancer (H) 1983     Verbal auditory hallucination 10/4/2012             Past Surgical History:   Reviewed and updated in Epic.  Past Surgical History:   Procedure Laterality Date     C OOPHORECTORMY FOR MALIG, W/BX  1983    UTERINE     CATARACT IOL, RT/LT Bilateral 2017     COLONOSCOPY N/A 3/16/2017    Procedure: COLONOSCOPY;  Surgeon: Traci Gonzalez MD;  Location:  GI     Coronary CTA  5/21/2014     HYSTERECTOMY  1983    uterine cancer yearly pap's per provider.     LAPAROSCOPIC CHOLECYSTECTOMY  2008     PHACOEMULSIFICATION CLEAR CORNEA WITH STANDARD INTRAOCULAR LENS IMPLANT Left 5/5/2017    Procedure: PHACOEMULSIFICATION CLEAR CORNEA WITH STANDARD INTRAOCULAR LENS IMPLANT;  LEFT EYE PHACOEMULSIFICATION CLEAR CORNEA WITH STANDARD INTRAOCULAR LENS IMPLANT ;  Surgeon: Tyra Diaz MD;  Location:  [facility]     PHACOEMULSIFICATION CLEAR CORNEA WITH STANDARD INTRAOCULAR LENS IMPLANT Right 6/30/2017    Procedure: PHACOEMULSIFICATION CLEAR CORNEA WITH STANDARD INTRAOCULAR LENS IMPLANT;  RIGHT EYE PHACOEMULSIFICATION CLEAR CORNEA WITH STANDARD INTRAOCULAR LENS IMPLANT;  Surgeon: Tyra Diaz MD;  Location:  [facility]     RELEASE TRIGGER FINGER  10/11/2012    Left thumb. Procedure: RELEASE TRIGGER FINGER;  LEFT THUMB TRIGGER RELEASE;  Surgeon: Tay Langley MD;  Location:  [facility]     RELEASE TRIGGER FINGER Right 12/26/2016    Procedure: RELEASE TRIGGER FINGER;  Surgeon: Albino Castañeda MD;  Location:  OR             Social History:     Social History     Social History     Marital status:      Residing in a group home.     Social History Main Topics     Smoking status: Never Smoker     Smokeless tobacco: Never Used     Alcohol use No     Drug use: No             Family History:      Family History   Problem Relation Age of Onset     CANCER Mother      BLADDER     Respiratory Mother      COPD     GASTROINTESTINAL DISEASE Mother      CIRRHOSIS OF LI BOLIVAR     Alcohol/Drug Mother      DIABETES Mother      Hypertension Mother      Lipids Mother      C.A.D. Mother      Glaucoma Mother      Alcohol/Drug Sister      MENTAL ILLNESS Sister      Alcohol/Drug Sister      Psychotic Disorder Sister      CANCER Maternal Grandmother      UNKNOWN TYPE     CANCER Brother      COLON     Cancer - colorectal Brother      IN HIS LATE 30S     Alcohol/Drug Brother       OF HEROIN OVERDOSE AT AGE 22 YRS     Macular Degeneration No family hx of              Allergies:     Allergies   Allergen Reactions     Imidazole Antifungals Hives     Tolerates diflucan     Ketoprofen Itching     Pruritis to topical     Lisinopril Hives     Metformin Other (See Comments)     Patient hospitalized for lactic acidosis - admitting provider suspectd caused by metformin     Metronidazole Hives     Posaconazole Hives     Tolerates diflucan             Medications:     Prescriptions Prior to Admission   Medication Sig Dispense Refill Last Dose     haloperidol (HALDOL) 5 MG tablet Take 1 tablet (5 mg) by mouth At Bedtime 30 tablet 1 2017 at Unknown time     haloperidol (HALDOL) 5 MG tablet Take 1 tablet (5 mg) by mouth every 12 hours as needed for agitation 30 tablet 1 2017 at Unknown time     insulin aspart (NOVOLOG FLEXPEN) 100 UNIT/ML injection Inject 20 Units Subcutaneous 3 times daily (with meals) Once daily, can add additional 5 units if BGs are >500mg/dL. 15 mL 3 2017 at unknown     losartan (COZAAR) 100 MG tablet TAKE 1 TABLET (100MG) BY MOUTH DAILY 28 tablet 3 2017 at 0800     gabapentin (NEURONTIN) 300 MG capsule TAKE 2 CAPSULES (600MG) BY MOUTH THREE TIMES A  capsule 3 2017 at 1400     insulin glargine (LANTUS) 100 UNIT/ML injection Inject 45 Units Subcutaneous At Bedtime 3 mL 3 2017 at  HS     vitamin D3 (CHOLECALCIFEROL) 74654 UNITS capsule TAKE 1 CAPSULE (50,000 UNITS) BY MOUTH ONCE A WEEK 4 capsule 3 10/31/2017 at Unknown time     albuterol (PROAIR HFA/PROVENTIL HFA/VENTOLIN HFA) 108 (90 BASE) MCG/ACT Inhaler Inhale 2 puffs into the lungs every 6 hours as needed for shortness of breath / dyspnea or wheezing 3 Inhaler 1      BENZTROPINE MESYLATE PO Take 1 mg by mouth 2 times daily   11/6/2017 at 0800     ACETAMINOPHEN PO Take 1,000 mg by mouth every 6 hours as needed for pain or fever   11/6/2017 at a.m.     atorvastatin (LIPITOR) 80 MG tablet TAKE 1 TABLET (80MG) BY MOUTH DAILY 28 tablet 11 11/6/2017 at Unknown time     metoprolol (TOPROL-XL) 100 MG 24 hr tablet TAKE 1 TABLET (100MG) BY MOUTH DAILY 28 tablet 11 11/6/2017 at 0800     polyethylene glycol (MIRALAX/GLYCOLAX) powder TAKE 17 GRAMS (1 CAPFUL) BY MOUTH DAILY 527 g 3 Past Month at Unknown time     ranitidine (ZANTAC) 300 MG tablet TAKE 1 TABLET (300MG) BY MOUTH AT BEDTIME 28 tablet 3 11/5/2017 at HS     aspirin (ASPIRIN LOW DOSE) 81 MG EC tablet Take 1 tablet (81 mg) by mouth daily 100 tablet 4 11/6/2017 at a.m.      ibuprofen (ADVIL,MOTRIN) 600 MG tablet Take 1 tablet (600 mg) by mouth every 4 hours as needed for moderate pain 60 tablet 0 11/5/2017 at Unknown time     melatonin 5 MG CAPS Take 1 capsule by mouth daily At dinnertime 90 capsule 1 11/5/2017 at 1800     order for DME Diabetic Shoes 2 each 0 Taking     chlorthalidone (HYGROTON) 25 MG tablet Take 1 tablet (25 mg) by mouth daily 30 tablet 3 11/6/2017 at 0800     blood glucose monitoring (ONETOUCH VERIO IQ SYSTEM) meter device kit Use to test blood sugar 2 times daily or as directed.  Ok to substitute alternative if insurance prefers. 1 kit 0 Taking     blood glucose monitoring (ONE TOUCH VERIO IQ) test strip Use to test blood sugar 2 times daily or as directed.  Ok to substitute alternative if insurance prefers. 150 strip 11 Taking     blood glucose monitoring (ONE TOUCH  DELICA) lancets Use to test blood sugar 2 times daily or as directed.  Ok to substitute alternative if insurance prefers. 150 each 11 Taking     CITALOPRAM HYDROBROMIDE PO Take 15 mg by mouth daily   Unknown at Unknown time     senna-docusate (SENOKOT-S;PERICOLACE) 8.6-50 MG per tablet Take 1 tablet by mouth 2 times daily as needed for constipation   11/6/2017 at Unknown time     ONE TOUCH ULTRA test strip TEST TWICE DAILY AS DIRECTED 150 strip 8 Taking     TRULICITY 1.5 MG/0.5ML pen INJECT 1.5MG SUBCUTANEOUSLY EVERY SEVEN DAYS 2 mL 11 10/31/2017 at unknown     ULTICARE MINI 31G X 6 MM insulin pen needle USE 4 DAILY OR AS DIRECTED 100 each 11 Taking     glucose 4 G CHEW Take 2 every 15 minutes for blood sugar <70mg/dL. Recheck blood sugar every 15 minutes until above 70mg/dL, then eat a substantial meal. 20 tablet 1 Taking     order for DME Hold Novolog if meals are refused. 1 each 0 Taking     order for DME Equipment being ordered: Rolling walker 1 Device 0 Taking        Current Facility-Administered Medications   Medication     insulin aspart (NovoLOG) inj (RAPID ACTING)     insulin aspart (NovoLOG) inj (RAPID ACTING)     insulin aspart (NovoLOG) inj (RAPID ACTING)     acetaminophen (TYLENOL) tablet 1,000 mg     albuterol (PROAIR HFA/PROVENTIL HFA/VENTOLIN HFA) Inhaler 2 puff     aspirin [facility] EC tablet 81 mg     atorvastatin (LIPITOR) tablet 80 mg     benztropine (COGENTIN) tablet 1 mg     chlorthalidone (HYGROTON) tablet 25 mg     citalopram (celeXA) half-tab 15 mg     gabapentin (NEURONTIN) capsule 600 mg     haloperidol (HALDOL) tablet 5 mg     haloperidol (HALDOL) tablet 5 mg     ibuprofen (ADVIL/MOTRIN) tablet 600 mg     insulin glargine (LANTUS) injection 45 Units     losartan (COZAAR) tablet 100 mg     melatonin tablet 5 mg     metoprolol (TOPROL-XL) 24 hr tablet 100 mg     ranitidine (ZANTAC) tablet 300 mg     hydrOXYzine (ATARAX) tablet 25-50 mg     OLANZapine (zyPREXA) tablet 10 mg    Or     OLANZapine  (zyPREXA) injection 10 mg     glucose 40 % gel 15-30 g    Or     dextrose 50 % injection 25-50 mL    Or     glucagon injection 1 mg            Physical Exam:   Blood pressure 116/62, pulse 76, temperature 98.6  F (37  C), temperature source Tympanic, resp. rate 16, height 1.524 m (5'), weight 103.4 kg (228 lb), last menstrual period 01/06/2015, SpO2 97 %.  Body mass index is 44.53 kg/(m^2).  GENERAL: Alert and oriented x 3. Ambulatory on unit, appears comfortable. Pleasant and conversant.   HEENT: Anicteric sclera. Mucous membranes moist.   CV: RRR. S1, S2. No murmurs appreciated.   RESPIRATORY: Effort normal on room air. Lungs CTAB with no wheezing, rales, rhonchi.   GI: Abdomen soft, non distended, non tender.   MUSCULOSKELETAL: No joint swelling or tenderness.  NEUROLOGICAL: No focal deficits. Moves all extremities.   EXTREMITIES: No peripheral edema. Intact bilateral pedal pulses.   SKIN: No jaundice. No rashes.           Data:   CBC:  Recent Labs   Lab Test  11/07/17   0801   WBC  5.8   RBC  3.49*   HGB  10.8*   HCT  32.8*   MCV  94   MCH  30.9   MCHC  32.9   RDW  13.0   PLT  209       CMP:  Recent Labs   Lab Test  11/07/17   0801   NA  139   POTASSIUM  3.9   CHLORIDE  104   ALLEN  9.5   CO2  28   BUN  17   CR  0.88   GLC  142*   AST  13   ALT  23   BILITOTAL  0.4   ALBUMIN  3.5   PROTTOTAL  7.8   ALKPHOS  114       TSH:  TSH   Date Value Ref Range Status   11/07/2017 3.21 0.40 - 4.00 mU/L Final       Unresulted Labs Ordered in the Past 30 Days of this Admission     No orders found for last 61 day(s).

## 2017-11-07 NOTE — ED PROVIDER NOTES
"  History     Chief Complaint   Patient presents with     Suicidal     Suicidal thoughts, \"I am afraid I will hurt myself.\"     The history is provided by the patient and medical records.     Nena Tang is a 57 year old female who is here reporting that she feels suicidal and unsafe. Patient reports hearing voices and seeing male figure who is telling her to kill herself. Patient has schizoaffective disorder. She recently saw her provider (3 days ago) and she did not feel she needed to be in the hospital. She felt her symptoms got worse over the weekend and now feels unsafe. She has many plans of suicide. She has been hospitalized here multiple times. Today is the anniversary of spouse's death. Patient was recommended to take prn haldol during her last psychiatric visit to manage her hallucinations. She admitted to taking a dose earlier today. She denies drug use. She reports her blood sugars typically have been much worse. Level today is at 160. Patient denies acute medical concerns. Sleep and appetite have been altered.    Please see DEC Crisis Assessment on 11/6/17 in  [facility] for further details.    PERSONAL MEDICAL HISTORY  Past Medical History:   Diagnosis Date     CAD (coronary artery disease)     5/2014 cath, nonbostructive stenosis to LAD, RCA.     Chronic low back pain 1/22/2013     Cocaine abuse, in remission      Fecal urgency 3/8/2012     History of heroin abuse      Hyperlipidemia LDL goal <100 10/31/2010     Hypertension 7/29/2013     Illiterate 8/30/2011     Irritable bowel syndrome      Left cataract      Migraine 4/19/2012     Migraine headache 4/22/2013     Moderate major depression (H) 6/8/2011     Noncompliance with medication regimen 6/8/2011     Obesity      CINDY (obstructive sleep apnea) 3/8/2012    uses CPAP     Osteopenia 10/7/2009     Schizoaffective disorder, depressive type (H) 2/25/2013     Suicidal intent 10/2/2013     Takotsubo cardiomyopathy      Type 2 diabetes mellitus (H) " 8/30/2011     Uterine cancer (H) 1983     Verbal auditory hallucination 10/4/2012     PAST SURGICAL HISTORY  Past Surgical History:   Procedure Laterality Date     C OOPHORECTORMY FOR MALALEJO, W/BX  1983    UTERINE     CATARACT IOL, RT/LT Bilateral 2017     COLONOSCOPY N/A 3/16/2017    Procedure: COLONOSCOPY;  Surgeon: Traci Gonzalez MD;  Location:  GI     Coronary CTA  5/21/2014     HYSTERECTOMY  1983    uterine cancer yearly pap's per provider.     LAPAROSCOPIC CHOLECYSTECTOMY  2008     PHACOEMULSIFICATION CLEAR CORNEA WITH STANDARD INTRAOCULAR LENS IMPLANT Left 5/5/2017    Procedure: PHACOEMULSIFICATION CLEAR CORNEA WITH STANDARD INTRAOCULAR LENS IMPLANT;  LEFT EYE PHACOEMULSIFICATION CLEAR CORNEA WITH STANDARD INTRAOCULAR LENS IMPLANT ;  Surgeon: Tyra Diaz MD;  Location:  [facility]     PHACOEMULSIFICATION CLEAR CORNEA WITH STANDARD INTRAOCULAR LENS IMPLANT Right 6/30/2017    Procedure: PHACOEMULSIFICATION CLEAR CORNEA WITH STANDARD INTRAOCULAR LENS IMPLANT;  RIGHT EYE PHACOEMULSIFICATION CLEAR CORNEA WITH STANDARD INTRAOCULAR LENS IMPLANT;  Surgeon: Tyra Diaz MD;  Location:  [facility]     RELEASE TRIGGER FINGER  10/11/2012    Left thumb. Procedure: RELEASE TRIGGER FINGER;  LEFT THUMB TRIGGER RELEASE;  Surgeon: Tay Langley MD;  Location:  [facility]     RELEASE TRIGGER FINGER Right 12/26/2016    Procedure: RELEASE TRIGGER FINGER;  Surgeon: Albino Castañeda MD;  Location: [facility] OR     FAMILY HISTORY  Family History   Problem Relation Age of Onset     CANCER Mother      BLADDER     Respiratory Mother      COPD     GASTROINTESTINAL DISEASE Mother      CIRRHOSIS OF LI BOLIVAR     Alcohol/Drug Mother      DIABETES Mother      Hypertension Mother      Lipids Mother      C.A.D. Mother      Glaucoma Mother      Alcohol/Drug Sister      MENTAL ILLNESS Sister      Alcohol/Drug Sister      Psychotic Disorder Sister      CANCER Maternal Grandmother      UNKNOWN TYPE     CANCER Brother      COLON     Cancer -  colorectal Brother      IN HIS LATE 30S     Alcohol/Drug Brother       OF HEROIN OVERDOSE AT AGE 22 YRS     Macular Degeneration No family hx of      SOCIAL HISTORY  Social History   Substance Use Topics     Smoking status: Never Smoker     Smokeless tobacco: Never Used     Alcohol use No      Comment: last month     MEDICATIONS  No current facility-administered medications for this encounter.      Current Outpatient Prescriptions   Medication     haloperidol (HALDOL) 5 MG tablet     haloperidol (HALDOL) 5 MG tablet     insulin aspart (NOVOLOG FLEXPEN) 100 UNIT/ML injection     losartan (COZAAR) 100 MG tablet     gabapentin (NEURONTIN) 300 MG capsule     insulin glargine (LANTUS) 100 UNIT/ML injection     vitamin D3 (CHOLECALCIFEROL) 61395 UNITS capsule     BENZTROPINE MESYLATE PO     ACETAMINOPHEN PO     atorvastatin (LIPITOR) 80 MG tablet     metoprolol (TOPROL-XL) 100 MG 24 hr tablet     polyethylene glycol (MIRALAX/GLYCOLAX) powder     ranitidine (ZANTAC) 300 MG tablet     TRULICITY 1.5 MG/0.5ML pen     aspirin (ASPIRIN LOW DOSE) 81 MG EC tablet     ibuprofen (ADVIL,MOTRIN) 600 MG tablet     melatonin 5 MG CAPS     order for DME     chlorthalidone (HYGROTON) 25 MG tablet     blood glucose monitoring (ONETOUCH VERIO IQ SYSTEM) meter device kit     blood glucose monitoring (ONE TOUCH VERIO IQ) test strip     blood glucose monitoring (ONE TOUCH DELICA) lancets     albuterol (PROAIR HFA/PROVENTIL HFA/VENTOLIN HFA) 108 (90 BASE) MCG/ACT Inhaler     CITALOPRAM HYDROBROMIDE PO     senna-docusate (SENOKOT-S;PERICOLACE) 8.6-50 MG per tablet     ONE TOUCH ULTRA test strip     ULTICARE MINI 31G X 6 MM insulin pen needle     glucose 4 G CHEW     order for DME     order for DME     ALLERGIES  Allergies   Allergen Reactions     Imidazole Antifungals Hives     Tolerates diflucan     Ketoprofen Itching     Pruritis to topical     Lisinopril Hives     Metformin Other (See Comments)     Patient hospitalized for lactic  acidosis - admitting provider suspectd caused by metformin     Metronidazole Hives     Posaconazole Hives     Tolerates diflucan         I have reviewed the Medications, Allergies, Past Medical and Surgical History, and Social History in the Epic system.    Review of Systems   Constitutional: Negative.    HENT: Negative.    Eyes: Negative.    Respiratory: Negative.    Cardiovascular: Negative.    Gastrointestinal: Negative.    Endocrine: Negative.    Musculoskeletal: Negative.    Skin: Negative.    Neurological: Negative.    Hematological: Negative.    Psychiatric/Behavioral: Positive for decreased concentration, hallucinations, sleep disturbance and suicidal ideas. The patient is nervous/anxious. The patient is not hyperactive.    All other systems reviewed and are negative.      Physical Exam   BP: 176/74  Pulse: 76  Heart Rate: 76  Temp: 96  F (35.6  C)  Resp: 18  SpO2: 97 %      Physical Exam   Constitutional: She appears well-developed and well-nourished.   HENT:   Head: Normocephalic.   Eyes: Pupils are equal, round, and reactive to light.   Neck: Normal range of motion.   Cardiovascular: Normal rate.    Pulmonary/Chest: Effort normal.   Abdominal: Soft.   Musculoskeletal: Normal range of motion.   Neurological: She is alert.   Skin: Skin is warm.   Psychiatric: Her speech is normal. Judgment normal. Her mood appears anxious. She is not agitated, not aggressive, not hyperactive, not actively hallucinating and not combative. Cognition and memory are normal. She exhibits a depressed mood. She expresses suicidal ideation. She is inattentive.   Nursing note and vitals reviewed.      ED Course     ED Course     Procedures      Labs Ordered and Resulted from Time of ED Arrival Up to the Time of Departure from the ED   GLUCOSE BY METER - Abnormal; Notable for the following:        Result Value    Glucose 160 (*)     All other components within normal limits   DRUG ABUSE SCREEN 6 CHEM DEP URINE ([facility])             Assessments & Plan (with Medical Decision Making)   Patient with schizoaffective disorder who has exacerbated psychosis and is feeling suicidal. She feels unsafe. She has many plans in mind. She is referred for admission. She is voluntary.    I have reviewed the nursing notes.    I have reviewed the findings, diagnosis, plan and need for follow up with the patient.    New Prescriptions    No medications on file       Final diagnoses:   Schizoaffective disorder, depressive type (H)   Suicidal ideation       11/6/2017   [facility], [place], EMERGENCY DEPARTMENT     Fadi Pearl MD  11/06/17 4546

## 2017-11-07 NOTE — PROGRESS NOTES
Initial Psychosocial Assessment     I have reviewed the chart, met with the patient, and developed Care Plan. Information for assessment obtained by review of electronic records, spoke to [facility] provider Zheng, and interview with patient     Presenting Problem:  Pt is a 57 year old female admitted to unit 3BW for SI with hx of schizoaffective disorder, depressive type. She denies substance use and has been sober for 12+ years. She states she is hearing voices telling her to hurt herself and she must die from a man in black with glowing yellow/red eyes that comes to her when the room is dark and she is alone. She reported the man in black  gets stronger  when there is a death anniversary. Her  s anniversary of passing was yesterday. She loves her AF and does not want to commit suicide there because  they are good people there . She is worried she is going to hurt herself so she came into the ED. She had plans to cut herself with scissors. She reports feeling safe on the unit and plans to attend groups.      History of Mental Health and Chemical Dependency:  Hx of schizoaffective disorder. Pt also reports she has bipolar and depression. No current substance use. Went to CD treatment 15 years ago at [facility]. Has 12+ years of sobriety.      Family Description (Constellation, Family Psychiatric History):  Pt grew up with both parents and 4 sisters. 1 sister  who also had schizoaffective per notes (or schizophrenia as reported today). Pt has 2 adult sons. There are two family members with CD issues.      Significant Life Events (Illness, Abuse, Trauma, Death):  Lost her mother and husbands within a few months of each other in . She also lost her sister recently who  on pt s birthday.      Living Situation:  Pt lives at [facility] where she loves it.      Educational Background:  Completed high school in special ed.     Occupational History:  Worked as a  at [employer] for 4 years.      Financial  Status:  SSDI, Medicare and [place]     Legal Issues:    None     Ethnic/Cultural Issues:   No issues identified.     Spiritual Orientation:  Hoahaoism      Service History:  No     Current Treatment Providers are:     PCP:  ART Schofield CNP  [facility]  [address]  [place] [address]     Psychiatrist:  [facility]  [address] [place], [place] [id number] [phone]     Therapist:  Has not gone recently but would like an appointment  Liliana Miller  [facility]  [address] [place], [place]   [phone]     CADI Worker:  None    Social Service Assessment/Plan:     Hospital staff will provide a safe environment and a therapeutic milieu. Pt will have psychiatric assessment and medication management by the psychiatrist. CTC will do individual inpatient treatment planning and after care planning. Staff will continue to assess pt as needed. Patient will participate in unit groups and activities. Pt will receive individual and group support on the unit.      Patient admitted for suicidal ideation. She will be monitored 24 hours on unit.  Medication will be reviewed, adjusted per MD's as indicated.   Will contact outpatient providers for care coordination.  Will discuss options for increased community supports.  Will continue to assess, coordinate care, and ensure appropriate f/u care is in place.

## 2017-11-08 LAB
GLUCOSE BLDC GLUCOMTR-MCNC: 185 MG/DL (ref 70–99)
GLUCOSE BLDC GLUCOMTR-MCNC: 187 MG/DL (ref 70–99)
GLUCOSE BLDC GLUCOMTR-MCNC: 211 MG/DL (ref 70–99)
GLUCOSE BLDC GLUCOMTR-MCNC: 279 MG/DL (ref 70–99)
GLUCOSE BLDC GLUCOMTR-MCNC: 320 MG/DL (ref 70–99)

## 2017-11-08 PROCEDURE — 99232 SBSQ HOSP IP/OBS MODERATE 35: CPT | Performed by: PSYCHIATRY & NEUROLOGY

## 2017-11-08 PROCEDURE — A9270 NON-COVERED ITEM OR SERVICE: HCPCS | Mod: GY | Performed by: PSYCHIATRY & NEUROLOGY

## 2017-11-08 PROCEDURE — 25000132 ZZH RX MED GY IP 250 OP 250 PS 637: Mod: GY | Performed by: PSYCHIATRY & NEUROLOGY

## 2017-11-08 PROCEDURE — 25000132 ZZH RX MED GY IP 250 OP 250 PS 637: Mod: GY | Performed by: PHYSICIAN ASSISTANT

## 2017-11-08 PROCEDURE — A9270 NON-COVERED ITEM OR SERVICE: HCPCS | Mod: GY | Performed by: PHYSICIAN ASSISTANT

## 2017-11-08 PROCEDURE — 90853 GROUP PSYCHOTHERAPY: CPT

## 2017-11-08 PROCEDURE — 12400007 ZZH R&B MH INTERMEDIATE UMMC

## 2017-11-08 PROCEDURE — 00000146 ZZHCL STATISTIC GLUCOSE BY METER IP

## 2017-11-08 RX ADMIN — INSULIN ASPART 2 UNITS: 100 INJECTION, SOLUTION INTRAVENOUS; SUBCUTANEOUS at 13:11

## 2017-11-08 RX ADMIN — HALOPERIDOL 5 MG: 5 TABLET ORAL at 21:18

## 2017-11-08 RX ADMIN — HYDROXYZINE HYDROCHLORIDE 50 MG: 25 TABLET ORAL at 21:20

## 2017-11-08 RX ADMIN — Medication 5 MG: at 17:29

## 2017-11-08 RX ADMIN — GABAPENTIN 600 MG: 300 CAPSULE ORAL at 13:17

## 2017-11-08 RX ADMIN — HALOPERIDOL 5 MG: 5 TABLET ORAL at 09:14

## 2017-11-08 RX ADMIN — CITALOPRAM HYDROBROMIDE 20 MG: 20 TABLET ORAL at 09:14

## 2017-11-08 RX ADMIN — BENZTROPINE MESYLATE 1 MG: 0.5 TABLET ORAL at 21:18

## 2017-11-08 RX ADMIN — LOSARTAN POTASSIUM 100 MG: 100 TABLET, FILM COATED ORAL at 09:14

## 2017-11-08 RX ADMIN — GABAPENTIN 600 MG: 300 CAPSULE ORAL at 21:17

## 2017-11-08 RX ADMIN — ASPIRIN 81 MG: 81 TABLET, COATED ORAL at 09:14

## 2017-11-08 RX ADMIN — RANITIDINE 300 MG: 150 TABLET ORAL at 21:17

## 2017-11-08 RX ADMIN — INSULIN ASPART 4 UNITS: 100 INJECTION, SOLUTION INTRAVENOUS; SUBCUTANEOUS at 17:30

## 2017-11-08 RX ADMIN — POLYETHYLENE GLYCOL 3350 17 G: 17 POWDER, FOR SOLUTION ORAL at 09:17

## 2017-11-08 RX ADMIN — METOPROLOL SUCCINATE 100 MG: 25 TABLET, EXTENDED RELEASE ORAL at 09:13

## 2017-11-08 RX ADMIN — INSULIN GLARGINE 45 UNITS: 100 INJECTION, SOLUTION SUBCUTANEOUS at 21:23

## 2017-11-08 RX ADMIN — BENZTROPINE MESYLATE 1 MG: 0.5 TABLET ORAL at 09:14

## 2017-11-08 RX ADMIN — ATORVASTATIN CALCIUM 80 MG: 40 TABLET, FILM COATED ORAL at 09:14

## 2017-11-08 RX ADMIN — CHLORTHALIDONE 25 MG: 25 TABLET ORAL at 09:21

## 2017-11-08 RX ADMIN — INSULIN ASPART 1 UNITS: 100 INJECTION, SOLUTION INTRAVENOUS; SUBCUTANEOUS at 09:16

## 2017-11-08 RX ADMIN — GABAPENTIN 600 MG: 300 CAPSULE ORAL at 09:13

## 2017-11-08 ASSESSMENT — ACTIVITIES OF DAILY LIVING (ADL)
LAUNDRY: UNABLE TO COMPLETE
GROOMING: INDEPENDENT
GROOMING: INDEPENDENT
DRESS: INDEPENDENT
ORAL_HYGIENE: INDEPENDENT
ORAL_HYGIENE: INDEPENDENT
DRESS: INDEPENDENT
LAUNDRY: WITH SUPERVISION

## 2017-11-08 NOTE — PLAN OF CARE
Problem: General Plan of Care (Inpatient Behavioral)  Goal: Individualization/Patient Specific Goal (IP Behavioral)  The patient and/or their representative will achieve their patient-specific goals related to the plan of care.    The patient-specific goals include:  Illness Management Recovery model: Objectives    Patient will identify reason(s) for hospitalization from their perspective.  Patient will identify a minimum of three goals for discharge.  Patient will identify a minimum of three triggers that may increase their symptoms.  Patient will identify a minimum of three coping skills they can do to stay well.   Patient will identify their support system to demonstrate readiness for discharge.   Illness Management Recovery model:  Warning Signs.     Patient identified the following early warning signs which may indicate that a relapse of their illness is startin. Thinking about  who    2. Sleeping too much  3.

## 2017-11-08 NOTE — PROGRESS NOTES
"  [facility], [place]   Psychiatric Progress Note        Interim History:   The patient's care was discussed with the treatment team during the daily team meeting and/or staff's chart notes were reviewed.  Staff report patient slept well last night and spend majority of day sleeping yesterday. Reported ongoing VH and AH but on task, does not appear in distress nor appears distracted or responding. Reported ongoing passive SI and depression. Affect is bright on approach and fully engaged. Attending one groups yesterday. independent with ADL and decision making. Compliant with medications and care. Eating well.    Met patient with staff. She was sleeping when approached but cooperative and engaged. Noted that she is feeling tired because she slept poorly last night. She stated that depression and anxiety resolving and denied SI and BREANNE. She noted that AH and VH resolved, \"nope they are gone\". Tolerating medications well. No racing thoughts or irritability. Appetite is intact. No physical complaints. She would like to return to [facility] on Friday.     Discussed medications and care plan.        Medications:       insulin aspart  20 Units Subcutaneous TID w/meals     insulin aspart  1-7 Units Subcutaneous TID AC     insulin aspart  1-5 Units Subcutaneous At Bedtime     polyethylene glycol  17 g Oral Daily     haloperidol  5 mg Oral BID     citalopram (celeXA) tablet 20 mg  20 mg Oral Daily     aspirin  81 mg Oral Daily     atorvastatin  80 mg Oral Daily     benztropine (COGENTIN) tablet 1 mg  1 mg Oral BID     chlorthalidone  25 mg Oral Daily     gabapentin  600 mg Oral TID     insulin glargine  45 Units Subcutaneous At Bedtime     losartan  100 mg Oral Daily     melatonin  5 mg Oral QPM     metoprolol  100 mg Oral Daily     ranitidine  300 mg Oral At Bedtime          Allergies:     Allergies   Allergen Reactions     Imidazole Antifungals Hives     Tolerates diflucan     Ketoprofen Itching     " Pruritis to topical     Lisinopril Hives     Metformin Other (See Comments)     Patient hospitalized for lactic acidosis - admitting provider suspectd caused by metformin     Metronidazole Hives     Posaconazole Hives     Tolerates diflucan          Labs:     Recent Results (from the past 24 hour(s))   Drug abuse screen 6 urine (tox)    Collection Time: 11/07/17 10:49 AM   Result Value Ref Range    Amphetamine Qual Urine Negative NEG^Negative    Barbiturates Qual Urine Negative NEG^Negative    Benzodiazepine Qual Urine Negative NEG^Negative    Cannabinoids Qual Urine Negative NEG^Negative    Cocaine Qual Urine Negative NEG^Negative    Ethanol Qual Urine Negative NEG^Negative    Opiates Qualitative Urine Negative NEG^Negative   UA with Microscopic reflex to Culture    Collection Time: 11/07/17 10:49 AM   Result Value Ref Range    Color Urine Light Yellow     Appearance Urine Clear     Glucose Urine 70 (A) NEG^Negative mg/dL    Bilirubin Urine Negative NEG^Negative    Ketones Urine Negative NEG^Negative mg/dL    Specific Gravity Urine 1.006 1.003 - 1.035    Blood Urine Negative NEG^Negative    pH Urine 6.0 5.0 - 7.0 pH    Protein Albumin Urine Negative NEG^Negative mg/dL    Urobilinogen mg/dL Normal 0.0 - 2.0 mg/dL    Nitrite Urine Negative NEG^Negative    Leukocyte Esterase Urine Small (A) NEG^Negative    Source Midstream Urine     WBC Urine 5 (H) 0 - 2 /HPF    RBC Urine <1 0 - 2 /HPF    Squamous Epithelial /HPF Urine 1 0 - 1 /HPF    Hyaline Casts 1 0 - 2 /LPF   Glucose by meter    Collection Time: 11/07/17 12:11 PM   Result Value Ref Range    Glucose 236 (H) 70 - 99 mg/dL   Glucose by meter    Collection Time: 11/07/17  4:58 PM   Result Value Ref Range    Glucose 280 (H) 70 - 99 mg/dL   Glucose by meter    Collection Time: 11/07/17  9:35 PM   Result Value Ref Range    Glucose 279 (H) 70 - 99 mg/dL   Glucose by meter    Collection Time: 11/08/17  2:07 AM   Result Value Ref Range    Glucose 187 (H) 70 - 99 mg/dL  "  Glucose by meter    Collection Time: 11/08/17  8:02 AM   Result Value Ref Range    Glucose 185 (H) 70 - 99 mg/dL          Psychiatric Examination:     Vitals:    11/07/17 0815 11/07/17 1223 11/07/17 1612 11/07/17 2055   BP: 121/66 116/62 113/50 115/60   Pulse: 67 76 78 77   Resp: 16 16 16 16   Temp: 96.7  F (35.9  C) 98.6  F (37  C) 99  F (37.2  C) 97.8  F (36.6  C)   TempSrc: Tympanic Tympanic Tympanic Tympanic   SpO2:    98%   Weight: 103.4 kg (228 lb)      Height:                         Sitting Orthostatic BP: 113/50      Sitting Orthostatic Pulse: 78 bpm      Standing Orthostatic BP: 100/54      Standing Orthostatic Pulse: 80 bpm       Weight is 228 lbs 0 oz  Body mass index is 44.53 kg/(m^2).    Appearance: adequately groomed, appeared as age stated and no apparent distress  Attitude:  cooperative  Eye Contact:  good  Mood:  anxious, depressed and better  Affect:  appropriate and in normal range, intensity is normal and reactive  Speech:  clear, coherent and normal prosody  Psychomotor Behavior:  no evidence of tardive dyskinesia, dystonia, or tics  Throught Process:  linear and goal oriented  Associations:  no loose associations  Thought Content:  no evidence of suicidal ideation or homicidal ideation and no evidence of psychotic thought  Insight:  fair  Judgement:  intact  Oriented to:  time, person, and place  Attention Span and Concentration:  intact  Recent and Remote Memory:  intact         Precautions:     Behavioral Orders   Procedures     Code 1 - Restrict to Unit     Fall precautions     Pt reports two falls within the past 6 mos     Routine Programming     As clinically indicated     Status 15     Every 15 minutes.     Status Individual Observation     Pt SIO while wearing CPAP - pt has audio / visual hallucinations at night to \"kill herself.\"     Order Specific Question:   AT NIGHT     Answer:   Distance and Exceptions     Order Specific Question:   Distance     Answer:   at the door of the " "patient's room     Order Specific Question:   Exceptions     Answer:   none     Suicide precautions     Plan to cut self with scissors earlier today.  Audio / visual hallucinations at night - a man dressed in black with glowing eyes tells her to \"kill herself.\"          Diagnoses:     1.  Schizoaffective disorder, depressed type, with acute exacerbation.   2.  Alcohol and cocaine use disorder in full remission.          Plan:     Medications:  1.  Benztropine continued at 1 mg twice a day.   2.  Celexa increased from 15 to 20 mg daily   3.  Gabapentin continued at 600 mg 3 times a day.   4.  Haldol increased from 5 mg at bedtime to 5 mg b.i.d.   5.  Melatonin continued at 5 mg at bedtime.   6.  P.r.n. Haldol for psychosis, hydroxyzine for anxiety, and Zyprexa for agitation and severe psychosis will be offered.      Legal Status and Disposition:  --  The patient was admitted to station 3B on a voluntary status.  --  Likely discharge on Friday.  She would like to return to the adult foster care.  Discharge will be granted once she achieves remission of suicidal ideations, reduction of visual and auditory hallucinations, and establish safety in the community.          "

## 2017-11-08 NOTE — PLAN OF CARE
Problem: Depressive Symptoms  Goal: Depressive Symptoms  Signs and symptoms of listed problems will be absent or manageable.   1. Pt will report that she no longer has suicidal thoughts.  2. Pt will attend 75% of unit programming.   3. Pt will contribute to aftercare/discharge planning.   Patient was mostly isolative to her room sleeping/napping. She appears calm and cooperative. She endorses visual and command auditory hallucinations to harm self. She denies any intent or plan to act on the hallucinations. Encouraged participation in groups and developing healthy coping skills.

## 2017-11-08 NOTE — PROGRESS NOTES
"Pt has flat affect, is calm and cooperative.  Endorses AH earlier today that command pt to kill herself.  Pt denies an urge to act upon at this time, but states she was thinking about it prior to admission. At night, pt endorses VH -  \"the man\" appears and commands pt to kill herself.  Pt attended community meeting.  She is present in the milieu, although withdrawn to self.  Appetite is good.  Compliant with medications.    Continues on SIO at NOC when using CPAP for safety.  Continues on suicide precautions.    Hydroxyzine 50 mg at HS - pt states was helpful in falling asleep last nt   and 279  "

## 2017-11-08 NOTE — PROGRESS NOTES
11/08/17 1600   General Information   Special Considerations completed on 11-8-17   Clinical Impression   Affect Flat   Orientation Oriented to person, place and time   Appearance and ADLs General cleanliness observed in most areas   Attention to Internal Stimuli No observed signs   Interaction Skills Interacts appropriately with staff;Interacts appropriately with peers   Ability to Communicate Needs Independent   Verbal Content Clear;Appropriate to topic   Ability to Maintain Boundaries Maintains appropriate physical boundaries;Maintains appropriate verbal boundaries   Participation Independently participates   Concentration Concentrates 30+ minutes   Ability to Concentrate With structure   Follows and Comprehends Directions Needs further assessment   Memory Needs further assessment;Delayed and immediate recall intact   Organization Independently organizes simple tasks   Decision Making Needs further assessment   Planning and Problem Solving Occasionally needs assist/feedback;Needs further assessment   Ability to Apply and Learn Concepts Needs further assessment   Frustrations / Stress Tolerance Independently identifies skills ;Needs further assessment   Level of Insight Identifies needs with structure/support   Self Esteem Can identify positives   Social Supports Has knowledge of support systems   General Observation/Plan   General Observations/Plan See Comments   Attended 1 of 2 OT groups. She participated quietly. Appeared attentive to discussion and joined in with occasional participation in the afternoon OT group, discussed and experienced basic moves focused mostly on breathing with Ruddy Ji, which has the goal of balance and stability of muscles and joint control. Pleasant with others and on approach.   See note on 11-7-17 for goals chosen and OT goal plan. Affect brightened some with interactions.

## 2017-11-08 NOTE — PROGRESS NOTES
Behavioral Health  Note    Behavioral Health  Spirituality Group Note    UNIT 3BW    Name: Nena Tang YOB: 1960   MRN: [id number] Age: 57 year old      Patient attended -led group, which included discussion of spirituality, coping with illness and building resilience.    Patient attended group for 1.0 hrs.    The patient actively participated in group discussion      Mica Baird  Chaplain Resident  Pager [phone]

## 2017-11-09 LAB
GLUCOSE BLDC GLUCOMTR-MCNC: 132 MG/DL (ref 70–99)
GLUCOSE BLDC GLUCOMTR-MCNC: 181 MG/DL (ref 70–99)
GLUCOSE BLDC GLUCOMTR-MCNC: 221 MG/DL (ref 70–99)
GLUCOSE BLDC GLUCOMTR-MCNC: 251 MG/DL (ref 70–99)
GLUCOSE BLDC GLUCOMTR-MCNC: 360 MG/DL (ref 70–99)

## 2017-11-09 PROCEDURE — H2032 ACTIVITY THERAPY, PER 15 MIN: HCPCS

## 2017-11-09 PROCEDURE — A9270 NON-COVERED ITEM OR SERVICE: HCPCS | Mod: GY | Performed by: PHYSICIAN ASSISTANT

## 2017-11-09 PROCEDURE — 25000132 ZZH RX MED GY IP 250 OP 250 PS 637: Mod: GY | Performed by: PHYSICIAN ASSISTANT

## 2017-11-09 PROCEDURE — 25000132 ZZH RX MED GY IP 250 OP 250 PS 637: Mod: GY | Performed by: PSYCHIATRY & NEUROLOGY

## 2017-11-09 PROCEDURE — 97150 GROUP THERAPEUTIC PROCEDURES: CPT | Mod: GO

## 2017-11-09 PROCEDURE — 00000146 ZZHCL STATISTIC GLUCOSE BY METER IP

## 2017-11-09 PROCEDURE — 90853 GROUP PSYCHOTHERAPY: CPT

## 2017-11-09 PROCEDURE — 12400007 ZZH R&B MH INTERMEDIATE UMMC

## 2017-11-09 PROCEDURE — A9270 NON-COVERED ITEM OR SERVICE: HCPCS | Mod: GY | Performed by: PSYCHIATRY & NEUROLOGY

## 2017-11-09 RX ORDER — LOSARTAN POTASSIUM 25 MG/1
50 TABLET ORAL DAILY
Status: DISCONTINUED | OUTPATIENT
Start: 2017-11-10 | End: 2017-11-10 | Stop reason: HOSPADM

## 2017-11-09 RX ADMIN — INSULIN GLARGINE 45 UNITS: 100 INJECTION, SOLUTION SUBCUTANEOUS at 20:52

## 2017-11-09 RX ADMIN — BENZTROPINE MESYLATE 1 MG: 0.5 TABLET ORAL at 08:55

## 2017-11-09 RX ADMIN — CITALOPRAM HYDROBROMIDE 20 MG: 20 TABLET ORAL at 08:55

## 2017-11-09 RX ADMIN — POLYETHYLENE GLYCOL 3350 17 G: 17 POWDER, FOR SOLUTION ORAL at 08:55

## 2017-11-09 RX ADMIN — GABAPENTIN 600 MG: 300 CAPSULE ORAL at 13:46

## 2017-11-09 RX ADMIN — Medication 5 MG: at 17:58

## 2017-11-09 RX ADMIN — BENZTROPINE MESYLATE 1 MG: 0.5 TABLET ORAL at 20:47

## 2017-11-09 RX ADMIN — IBUPROFEN 600 MG: 600 TABLET ORAL at 17:03

## 2017-11-09 RX ADMIN — HALOPERIDOL 5 MG: 5 TABLET ORAL at 08:56

## 2017-11-09 RX ADMIN — GABAPENTIN 600 MG: 300 CAPSULE ORAL at 20:47

## 2017-11-09 RX ADMIN — GABAPENTIN 600 MG: 300 CAPSULE ORAL at 08:55

## 2017-11-09 RX ADMIN — RANITIDINE 300 MG: 150 TABLET ORAL at 20:48

## 2017-11-09 RX ADMIN — INSULIN ASPART 3 UNITS: 100 INJECTION, SOLUTION INTRAVENOUS; SUBCUTANEOUS at 17:36

## 2017-11-09 RX ADMIN — ATORVASTATIN CALCIUM 80 MG: 40 TABLET, FILM COATED ORAL at 08:55

## 2017-11-09 RX ADMIN — ASPIRIN 81 MG: 81 TABLET, COATED ORAL at 08:55

## 2017-11-09 RX ADMIN — METOPROLOL SUCCINATE 100 MG: 25 TABLET, EXTENDED RELEASE ORAL at 08:55

## 2017-11-09 RX ADMIN — INSULIN ASPART 2 UNITS: 100 INJECTION, SOLUTION INTRAVENOUS; SUBCUTANEOUS at 12:16

## 2017-11-09 RX ADMIN — HALOPERIDOL 5 MG: 5 TABLET ORAL at 20:47

## 2017-11-09 ASSESSMENT — ACTIVITIES OF DAILY LIVING (ADL)
ORAL_HYGIENE: INDEPENDENT
DRESS: SCRUBS (BEHAVIORAL HEALTH);INDEPENDENT
GROOMING: INDEPENDENT
GROOMING: INDEPENDENT
DRESS: STREET CLOTHES;INDEPENDENT
ORAL_HYGIENE: INDEPENDENT
LAUNDRY: WITH SUPERVISION
LAUNDRY: UNABLE TO COMPLETE

## 2017-11-09 NOTE — PLAN OF CARE
"Problem: Depressive Symptoms  Goal: Depressive Symptoms  Signs and symptoms of listed problems will be absent or manageable.   1. Pt will report that she no longer has suicidal thoughts.  2. Pt will attend 75% of unit programming.   3. Pt will contribute to aftercare/discharge planning.      Attended 2 of 2 OT groups. She worked at a more steady pace and added more steps than she initially planned when encouraged. With time she initiated asking assistance with details as needed. She was social with peers. She also took an active role in activity focused on Values. She spoke up, offered ideas and explained why the values she chose to be important to her. She stressed the importance of \"a sense of humor\" to be greatly valued to her, \"It makes me feel good to make people laugh\". Stated importance of her grandchildren to her in her life. Affect brightened with interactions.          "

## 2017-11-09 NOTE — PROGRESS NOTES
Spoke with Fior from [facility] to report that pt will be discharging tomorrow. Fior will  pt at 1:00 pm.

## 2017-11-09 NOTE — PROGRESS NOTES
Brief Medicine Note    Medicine following for BP and BG management.    BP's have been running low-normal. Patient has had several doses of losartan and HCTZ held due to parameters not being met. Per last office visit on 10/23/17, patient's BP was noted to be 90/56. Suspect too strict of control on current regimen. Will decrease losartan to 50 mg QD, continue metoprolol and HCTZ at current dose and monitor BP trend. May need to decrease further if BP remains softer.    BG have been running on the higher side, ranging 132-320 over past 24 hours. Fasting BG in the AM actually looks adequate (132). Appears to be spiking after meals. Will increase meal-time coverage to 24 units novolog.    Medicine will continue to follow for BP and BG management.    Regla Bello PA-C  Hospitalist Service  [phone]

## 2017-11-09 NOTE — PROGRESS NOTES
"Pt was in the milieu.  She attended groups + participated.  Confirmed anx/dep but confirmed that she is no longer feeling SI, has no SIB.  \"I think that the meds are helping.\"    "

## 2017-11-09 NOTE — PROGRESS NOTES
Pt spend most of the time during this shift in the lounge. Pt attended and participated in group. Pt reports feeling better than when admitted, she reports depression and anxiety at 6/10. Pt endorses passive SI.Pt was calm and social. Pt is pleasant on approach. Affect is brighter     11/08/17 2125   Behavioral Health   Thinking distractable   Orientation person: oriented;place: oriented;date: oriented;time: oriented   Memory baseline memory   Insight insight appropriate to situation   Judgement impaired   Eye Contact at examiner   Affect full range affect   Mood mood is calm   Physical Appearance/Attire attire appropriate to age and situation   Hygiene well groomed   Suicidality thoughts only   Self Injury other (see comment)  (denies)   Activity other (see comment)  (attended group)   Speech clear;coherent   Medication Sensitivity no stated side effects   Psychomotor / Gait slow;steady   Psycho Education   Type of Intervention 1:1 intervention   Response participates, initiates socially appropriate   Hours 0.5   Treatment Detail check-in   Activities of Daily Living   Hygiene/Grooming independent   Oral Hygiene independent   Dress independent   Laundry unable to complete   Room Organization independent   Activity   Activity Assistance Provided independent   .

## 2017-11-10 VITALS
DIASTOLIC BLOOD PRESSURE: 54 MMHG | TEMPERATURE: 97.3 F | HEIGHT: 60 IN | WEIGHT: 230.1 LBS | BODY MASS INDEX: 45.17 KG/M2 | SYSTOLIC BLOOD PRESSURE: 149 MMHG | RESPIRATION RATE: 16 BRPM | HEART RATE: 76 BPM | OXYGEN SATURATION: 98 %

## 2017-11-10 LAB
GLUCOSE BLDC GLUCOMTR-MCNC: 242 MG/DL (ref 70–99)
GLUCOSE BLDC GLUCOMTR-MCNC: 244 MG/DL (ref 70–99)
GLUCOSE BLDC GLUCOMTR-MCNC: 264 MG/DL (ref 70–99)

## 2017-11-10 PROCEDURE — 25000132 ZZH RX MED GY IP 250 OP 250 PS 637: Mod: GY | Performed by: PSYCHIATRY & NEUROLOGY

## 2017-11-10 PROCEDURE — A9270 NON-COVERED ITEM OR SERVICE: HCPCS | Mod: GY | Performed by: PSYCHIATRY & NEUROLOGY

## 2017-11-10 PROCEDURE — 97150 GROUP THERAPEUTIC PROCEDURES: CPT | Mod: GO

## 2017-11-10 PROCEDURE — 90853 GROUP PSYCHOTHERAPY: CPT

## 2017-11-10 PROCEDURE — 25000132 ZZH RX MED GY IP 250 OP 250 PS 637: Mod: GY | Performed by: PHYSICIAN ASSISTANT

## 2017-11-10 PROCEDURE — 99238 HOSP IP/OBS DSCHRG MGMT 30/<: CPT | Performed by: PSYCHIATRY & NEUROLOGY

## 2017-11-10 PROCEDURE — A9270 NON-COVERED ITEM OR SERVICE: HCPCS | Mod: GY | Performed by: PHYSICIAN ASSISTANT

## 2017-11-10 PROCEDURE — 00000146 ZZHCL STATISTIC GLUCOSE BY METER IP

## 2017-11-10 RX ORDER — HALOPERIDOL 5 MG/1
5 TABLET ORAL EVERY 12 HOURS PRN
Qty: 30 TABLET | Refills: 0 | Status: ON HOLD | OUTPATIENT
Start: 2017-11-10 | End: 2018-01-17

## 2017-11-10 RX ORDER — CITALOPRAM HYDROBROMIDE 20 MG/1
20 TABLET ORAL DAILY
Qty: 30 TABLET | Refills: 0 | Status: SHIPPED | OUTPATIENT
Start: 2017-11-10 | End: 2017-11-27

## 2017-11-10 RX ORDER — HALOPERIDOL 5 MG/1
5 TABLET ORAL 2 TIMES DAILY
Qty: 60 TABLET | Refills: 0 | Status: SHIPPED | OUTPATIENT
Start: 2017-11-10 | End: 2017-12-10

## 2017-11-10 RX ADMIN — METOPROLOL SUCCINATE 100 MG: 25 TABLET, EXTENDED RELEASE ORAL at 08:51

## 2017-11-10 RX ADMIN — GABAPENTIN 600 MG: 300 CAPSULE ORAL at 08:51

## 2017-11-10 RX ADMIN — GABAPENTIN 600 MG: 300 CAPSULE ORAL at 14:31

## 2017-11-10 RX ADMIN — HALOPERIDOL 5 MG: 5 TABLET ORAL at 08:51

## 2017-11-10 RX ADMIN — ATORVASTATIN CALCIUM 80 MG: 40 TABLET, FILM COATED ORAL at 08:51

## 2017-11-10 RX ADMIN — CHLORTHALIDONE 25 MG: 25 TABLET ORAL at 08:51

## 2017-11-10 RX ADMIN — ASPIRIN 81 MG: 81 TABLET, COATED ORAL at 08:51

## 2017-11-10 RX ADMIN — BENZTROPINE MESYLATE 1 MG: 0.5 TABLET ORAL at 08:51

## 2017-11-10 RX ADMIN — CITALOPRAM HYDROBROMIDE 20 MG: 20 TABLET ORAL at 08:50

## 2017-11-10 RX ADMIN — INSULIN ASPART 3 UNITS: 100 INJECTION, SOLUTION INTRAVENOUS; SUBCUTANEOUS at 08:54

## 2017-11-10 RX ADMIN — LOSARTAN POTASSIUM 50 MG: 25 TABLET, FILM COATED ORAL at 08:51

## 2017-11-10 RX ADMIN — IBUPROFEN 600 MG: 600 TABLET ORAL at 14:31

## 2017-11-10 RX ADMIN — INSULIN ASPART 3 UNITS: 100 INJECTION, SOLUTION INTRAVENOUS; SUBCUTANEOUS at 12:33

## 2017-11-10 ASSESSMENT — ACTIVITIES OF DAILY LIVING (ADL)
ORAL_HYGIENE: INDEPENDENT
GROOMING: INDEPENDENT
DRESS: INDEPENDENT
DRESS: INDEPENDENT
GROOMING: INDEPENDENT
ORAL_HYGIENE: INDEPENDENT

## 2017-11-10 NOTE — DISCHARGE INSTRUCTIONS
Behavioral Discharge Planning and Instructions      Summary:  You were admitted on 11/6/2017  due to Depression.  You were treated by Dr. Wade Siddiqi MD and discharged on 11/10/17 from [facility] to Group Home.      Principal Diagnosis:   Schizoaffective disorder, depressed type, with acute exacerbation.     Health Care Follow-up Appointments:   Psychiatrist:  Hillary Winkelmann - Wednesday, November 15th at 2:00 pm  [facility]  [address] [place] [address]  [phone]     Therapist:  Liliana Miller - Tuesday, November 28th at 12:00 pm  [facility]  [address] [place], [place]   [phone]       Attend all scheduled appointments with your outpatient providers. Call at least 24 hours in advance if you need to reschedule an appointment to ensure continued access to your outpatient providers.   Major Treatments, Procedures and Findings:  You were provided with: a psychiatric assessment, assessed for medical stability, medication evaluation and/or management and milieu management    Symptoms to Report: feeling more aggressive, increased confusion, losing more sleep, mood getting worse or thoughts of suicide    Early warning signs can include: increased depression or anxiety sleep disturbances increased thoughts or behaviors of suicide or self-harm  increased unusual thinking, such as paranoia or hearing voices    Safety and Wellness:  Take all medicines as directed.  Make no changes unless your doctor suggests them.      Follow treatment recommendations.  Refrain from alcohol and non-prescribed drugs.  If there is a concern for safety, call 911.    Resources:   Crisis Intervention: 221.502.2769 or 567-054-5851 (TTY: 305.442.6466).  Call anytime for help.  National [place] on Mental Illness (www.[place].marah.org): 588.323.5249 or 175-946-6601.  Suicide Awareness Voices of Education (SAVE) (www.save.org): 207-707-PJPD (0971)  National Suicide Prevention Line  (www.mentalhealthmn.org): 909-178-IPAZ (6182)  Mental Health Consumer/Survivor Network of [place] (www.mhcsn.net): 179.625.3591 or 837-063-8756  Mental Health Association of [place] (www.mentalhealth.org): 514.298.1645 or 718-892-6423  [facility] Crisis Response [phone]    The treatment team has appreciated the opportunity to work with you.     If you have any questions or concerns our unit number is [phone].      STOP TAKING THIS MEDICATION:  ESCITALOPRAM 10 MG DAILY (also known as Lexapro)

## 2017-11-10 NOTE — PROGRESS NOTES
"Pt has been out in the milieu and social with peers. Pt has been attending programming and utilizing coping skills, coloring, watching the movie and attending groups. Pt states her mood, \"much better.\" She reports that her depression, \"6 out of 10,\" (10 being the worst). She states he anxiety, \"5-6 out of 10,\" (10 being the worst). She says her medications are helpful. Pt states she has been attending groups, \"yes, they are pretty good.\" She says her sleep, \"much better.\" Her appetite has been, \"good.\" Pt plans on discharging tomorrow and states, \"I am excited to go home and yes I feel ready.\" Pt denies any voice or hallucinations this evening. Pt has chronic SI thoughts but denies any of those thoughts this evening. Pt was calm and cooperative. Somewhat guarded with writer when checking in.     Pt's BG- 251 and 360 this evening.     BP continues to run on the lower side (see IM note- they are following this). This evening (125/55) and (101/45), pt has been drinking fluids and steady on her feet.     Pt continues on a SIO on nights for safety when using CPAP.      Pt requested and was given PRN Ibuprofen around dinner time for some lower back pain.   "

## 2017-11-10 NOTE — PROGRESS NOTES
Brief Medicine Note    Medicine following for HTN and DM2. Sugars remain relatively elevated ranging 130-360 within the past 24h, with AM glucose 242 today.     Patient to discharge back to group home today.     Recommendations:    HTN:  Cont current anti-hypertensive regimen on discharge; follow up with PCP in 1-2 weeks    DM2:  Increase Lantus to 55 units QHS (~20% increase); Restart trulicity upon discharge; Cont Novolog 20 units TID w meals; Primary care vs Endocrine follow up in 1-2 weeks.    Ryan Montoya PA-C  Internal Medicine [facility] Hospitalist  [facility]  Pager [phone]

## 2017-11-10 NOTE — PROGRESS NOTES
Facilitated a group where pt anderson a life tree which involved identifying roots, values, goals, aspirations and legacies.  Pt participated fully in group.

## 2017-11-10 NOTE — PROGRESS NOTES
"Pt discharged to [facility] home, [facility], at 1735.  Discharge instructions, medication instructions / changes and follow-up appointments reviewed with pt and Zheng, staff from [facility].  Zheng's questions regarding medication changes clarified and updated on instructions.  Pt denies SI/SIB/HI - states that hallucinations are \"not a problem\" at this time.  Pt states she feels \"great\" and is ready for discharge.  Pt and Zheng left the Unit with discharge instructions, discharge medications, Items from security, and belongings.  Transportation back to [facility] provided by Zheng.  "

## 2017-11-10 NOTE — PROGRESS NOTES
Attended 2 of 2 OT groups. She was pleasant, social, worked at a steady pace on familiar step task. She asked for recommendations and assistance as needed. She talked about the support she gains from [facility] and her other programs she attends in the community. She spoke highly of the assistance she receives. Attentive to others and her work details.

## 2017-11-10 NOTE — DISCHARGE SUMMARY
Psychiatric Discharge Summary    Nena Tang MRN# [id number]   Age: 57 year old YOB: 1960     Date of Admission:  11/6/2017  Date of Discharge:  11/10/2017  5:30 PM  Admitting Physician:  Wade Siddiqi MD  Discharge Physician:  Wade Siddiqi MD         Event Leading to Hospitalization:     Nena Tang is a 57-year-old -American female with history of schizoaffective disorder, depressed type, and remote history of chemical dependency.   She resides at adult foster [facility].  She has been there for about a year.  She tells me that she resides there with 3 other residents.  She likes it there and been treated well.  She tells me that she has been compliant with medications and tolerating them well.  She was seen by her outpatient psychiatrist via telepsych 3 days ago and p.victor manuel Garberl was added.  At the time, the outpatient provider did not feel that the patient needed to be hospitalized.  She also has a therapist whom she tells me that she sees regularly.  The patient stated that she has been stressed out because yesterday was the anniversary of her spouse's death.  She also lost her mother  recently.  She likes the adult foster care facility she resides at and she would like to return there once her mood is stabilized.  At the Emergency Department, she alleged having suicidal ideations with the plan to cut herself with scissors.  She was unable to contract for safety on discharge and demanded admission.  Upon arrival to the unit, the patient reported no suicidal ideations but at the time of this encounter, she tells me that she is having passive suicidal ideations.         The patient states that she has been feeling depressed for about 2 weeks, was triggered by her 's death anniversary.  She tells me that she often has difficulty falling and staying asleep.  Her appetite has been poor, but tells me that she has been gaining weight.  Energy, motivation, concentration and  focus fluctuate.  She tells me that in the past couple of weeks she has been having intermittent hopelessness, helplessness with fleeting suicidal ideations.  She spoke about wanting to cut herself with scissors but denies any actual intent.  She was unable to recall or  identify past history related to jennifer or hypomania.  She tells me that she sees a man dressed in a black suit that appears in her room only when she shuts off the light.  She tells me that the man is telling her that she should kill herself.  She stated that such hallucinations have intensified in the past couple of weeks.  Other than  she reports no other visual or auditory hallucinations.  She denies paranoia.  She denies symptoms related to eating disorder, PTSD, and OCD.  She endorsed some situational anxiety but no history of panic attacks or agoraphobia.       The patient sees Hilary Winkelmann at [facility] for medication management.  She sees Liliana Miller for individual therapy at [facility].  She told me that she sees her therapist regularly but she told our  that she has not seen her therapist for a while.  She was seen by her outpatient psychiatrist via telehealth 3 days ago.   ULYSSES.r.n. Haldol was added.  The patient tells me that she carries the diagnosis of schizoaffective disorder, depressed type.  She has been hospitalized over 12 times.  She initially tells me that she was last hospitalized 2 months ago but later recanted and stated that she was last hospitalized in 2016 at our facility.  Based on records she was hospitalized at [facility] for 3 days in 2016 and was discharged on home medications.  She denies prior suicidal attempts, but acknowledged that she has had frequent ideations.  Denies history of self-harming behavior.  She tells me that she was in chemical dependency treatment close to 20 years ago and has been sober since.  She recalls being tried on  Zoloft and Abilify, did not find them helpful.  No prior trials with Zyprexa, Seroquel, Risperdal, or mood stabilizers.       The patient tells me that her mother was alcoholic.  Her sister had schizophrenia.  The patient denied tobacco use.  She had a history of heavy cocaine and alcohol use for about 15-16 years, but tells me that she has been sober for 20 years.  No history of methamphetamine or heroin use.  She drinks about a pot of coffee per day.       The patient has history of diabetes, hypertension, sleep apnea, and recalls undergoing hysterectomy.  Based on medical records, she also carries the diagnosis of uterine cancer 1983, Takotsubo cardiomyopathy and coronary artery disease, osteopenia, obesity, migraines, left cataract, and lower back pain.  Based on medical records, she also underwent release of trigger finger, cataract IOL bilateral, and oophorectomy for malignancy.            Diagnoses:     1.  Schizoaffective disorder, depressed type, with acute exacerbation.   2.  Alcohol and cocaine use disorder in full remission.          Labs:     Recent Results (from the past 168 hour(s))   Glucose by meter    Collection Time: 11/06/17  5:49 PM   Result Value Ref Range    Glucose 160 (H) 70 - 99 mg/dL   Glucose by meter    Collection Time: 11/06/17 10:27 PM   Result Value Ref Range    Glucose 212 (H) 70 - 99 mg/dL   Glucose by meter    Collection Time: 11/07/17  4:29 AM   Result Value Ref Range    Glucose 140 (H) 70 - 99 mg/dL   Comprehensive metabolic panel    Collection Time: 11/07/17  8:01 AM   Result Value Ref Range    Sodium 139 133 - 144 mmol/L    Potassium 3.9 3.4 - 5.3 mmol/L    Chloride 104 94 - 109 mmol/L    Carbon Dioxide 28 20 - 32 mmol/L    Anion Gap 7 3 - 14 mmol/L    Glucose 142 (H) 70 - 99 mg/dL    Urea Nitrogen 17 7 - 30 mg/dL    Creatinine 0.88 0.52 - 1.04 mg/dL    GFR Estimate 66 >60 mL/min/1.7m2    GFR Estimate If Black 80 >60 mL/min/1.7m2    Calcium 9.5 8.5 - 10.1 mg/dL    Bilirubin  Total 0.4 0.2 - 1.3 mg/dL    Albumin 3.5 3.4 - 5.0 g/dL    Protein Total 7.8 6.8 - 8.8 g/dL    Alkaline Phosphatase 114 40 - 150 U/L    ALT 23 0 - 50 U/L    AST 13 0 - 45 U/L   CBC with platelets differential    Collection Time: 11/07/17  8:01 AM   Result Value Ref Range    WBC 5.8 4.0 - 11.0 10e9/L    RBC Count 3.49 (L) 3.8 - 5.2 10e12/L    Hemoglobin 10.8 (L) 11.7 - 15.7 g/dL    Hematocrit 32.8 (L) 35.0 - 47.0 %    MCV 94 78 - 100 fl    MCH 30.9 26.5 - 33.0 pg    MCHC 32.9 31.5 - 36.5 g/dL    RDW 13.0 10.0 - 15.0 %    Platelet Count 209 150 - 450 10e9/L    Diff Method Automated Method     % Neutrophils 45.0 %    % Lymphocytes 45.0 %    % Monocytes 7.7 %    % Eosinophils 1.7 %    % Basophils 0.3 %    % Immature Granulocytes 0.3 %    Nucleated RBCs 0 0 /100    Absolute Neutrophil 2.6 1.6 - 8.3 10e9/L    Absolute Lymphocytes 2.6 0.8 - 5.3 10e9/L    Absolute Monocytes 0.4 0.0 - 1.3 10e9/L    Absolute Eosinophils 0.1 0.0 - 0.7 10e9/L    Absolute Basophils 0.0 0.0 - 0.2 10e9/L    Abs Immature Granulocytes 0.0 0 - 0.4 10e9/L    Absolute Nucleated RBC 0.0    TSH with free T4 reflex    Collection Time: 11/07/17  8:01 AM   Result Value Ref Range    TSH 3.21 0.40 - 4.00 mU/L   Vitamin D    Collection Time: 11/07/17  8:01 AM   Result Value Ref Range    Vitamin D Deficiency screening 69 20 - 75 ug/L   Vitamin B12    Collection Time: 11/07/17  8:01 AM   Result Value Ref Range    Vitamin B12 308 193 - 986 pg/mL   Folate    Collection Time: 11/07/17  8:01 AM   Result Value Ref Range    Folate 10.7 >5.4 ng/mL   Hemoglobin A1c    Collection Time: 11/07/17  8:01 AM   Result Value Ref Range    Hemoglobin A1C 8.9 (H) 4.3 - 6.0 %   Glucose by meter    Collection Time: 11/07/17  8:22 AM   Result Value Ref Range    Glucose 144 (H) 70 - 99 mg/dL   Drug abuse screen 6 urine (tox)    Collection Time: 11/07/17 10:49 AM   Result Value Ref Range    Amphetamine Qual Urine Negative NEG^Negative    Barbiturates Qual Urine Negative NEG^Negative     Benzodiazepine Qual Urine Negative NEG^Negative    Cannabinoids Qual Urine Negative NEG^Negative    Cocaine Qual Urine Negative NEG^Negative    Ethanol Qual Urine Negative NEG^Negative    Opiates Qualitative Urine Negative NEG^Negative   UA with Microscopic reflex to Culture    Collection Time: 11/07/17 10:49 AM   Result Value Ref Range    Color Urine Light Yellow     Appearance Urine Clear     Glucose Urine 70 (A) NEG^Negative mg/dL    Bilirubin Urine Negative NEG^Negative    Ketones Urine Negative NEG^Negative mg/dL    Specific Gravity Urine 1.006 1.003 - 1.035    Blood Urine Negative NEG^Negative    pH Urine 6.0 5.0 - 7.0 pH    Protein Albumin Urine Negative NEG^Negative mg/dL    Urobilinogen mg/dL Normal 0.0 - 2.0 mg/dL    Nitrite Urine Negative NEG^Negative    Leukocyte Esterase Urine Small (A) NEG^Negative    Source Midstream Urine     WBC Urine 5 (H) 0 - 2 /HPF    RBC Urine <1 0 - 2 /HPF    Squamous Epithelial /HPF Urine 1 0 - 1 /HPF    Hyaline Casts 1 0 - 2 /LPF   Glucose by meter    Collection Time: 11/07/17 12:11 PM   Result Value Ref Range    Glucose 236 (H) 70 - 99 mg/dL   Glucose by meter    Collection Time: 11/07/17  4:58 PM   Result Value Ref Range    Glucose 280 (H) 70 - 99 mg/dL   Glucose by meter    Collection Time: 11/07/17  9:35 PM   Result Value Ref Range    Glucose 279 (H) 70 - 99 mg/dL   Glucose by meter    Collection Time: 11/08/17  2:07 AM   Result Value Ref Range    Glucose 187 (H) 70 - 99 mg/dL   Glucose by meter    Collection Time: 11/08/17  8:02 AM   Result Value Ref Range    Glucose 185 (H) 70 - 99 mg/dL   Glucose by meter    Collection Time: 11/08/17 12:03 PM   Result Value Ref Range    Glucose 211 (H) 70 - 99 mg/dL   Glucose by meter    Collection Time: 11/08/17  5:17 PM   Result Value Ref Range    Glucose 320 (H) 70 - 99 mg/dL   Glucose by meter    Collection Time: 11/08/17  9:08 PM   Result Value Ref Range    Glucose 279 (H) 70 - 99 mg/dL   Glucose by meter    Collection Time:  11/09/17  2:03 AM   Result Value Ref Range    Glucose 181 (H) 70 - 99 mg/dL   Glucose by meter    Collection Time: 11/09/17  8:28 AM   Result Value Ref Range    Glucose 132 (H) 70 - 99 mg/dL   Glucose by meter    Collection Time: 11/09/17 12:09 PM   Result Value Ref Range    Glucose 221 (H) 70 - 99 mg/dL   Glucose by meter    Collection Time: 11/09/17  5:01 PM   Result Value Ref Range    Glucose 251 (H) 70 - 99 mg/dL   Glucose by meter    Collection Time: 11/09/17  8:45 PM   Result Value Ref Range    Glucose 360 (H) 70 - 99 mg/dL   Glucose by meter    Collection Time: 11/10/17  1:46 AM   Result Value Ref Range    Glucose 264 (H) 70 - 99 mg/dL   Glucose by meter    Collection Time: 11/10/17  8:16 AM   Result Value Ref Range    Glucose 242 (H) 70 - 99 mg/dL          Consults:   Consultation during this admission received from internal medicine.  No medical intervention was indicated.           Hospital Course:   Nena Tang was admitted to [facility] with attending Wade Siddiqi MD as a voluntary patient. The patient was placed under status 15 (15 minute checks) to ensure patient safety.   CBC, BMP and utox obtained.  Patient  did not require seclusion or administration of emergency medications to manage behavior.    The following medication changes took place:   1.  Benztropine continued at 1 mg twice a day.   2.  Celexa increased from 15 to 20 mg daily   3.  Gabapentin continued at 600 mg 3 times a day.   4.  Haldol increased from 5 mg at bedtime to 5 mg b.i.d.   5.  Melatonin continued at 5 mg at bedtime.     The patient tolerated medications well. Reported mood symptoms resolved.  She was initially isolative and spend majority of the day in bed. However, as symptoms subsided, patient was active on the unit. The patient was social, engaged and attended groups. She reported auditory and visual hallucinations. No overt jennifer, confusion or paranoia noted. The patient maintained denial of SI, HI and BREANNE. The  patient slept well. Appetite was intact. The patient was compliant with medications and care.     Nena Tang was released to home. At the time of this encounter, Nena Tang was determined to not be a danger to herself or others and symptoms did not meet criteria for involuntary hospitalization.  The patient denied depression, anxiety, racing thoughts and irritability. The patient denied hallucinations and paranoia. The patient was future oriented and denied SI, BREANNE and HI. The patient noted tolerating medications well.    Safety plan, post discharge recommendations and relapse prevention were discussed with the patient. The patient agreed to call 911 or present to ED if symptoms worsen or developed thoughts of suicide, self harm or homicide.  The patient agreed to continue medications and outpatient care.         Discharge Medications:     Current Discharge Medication List      CONTINUE these medications which have CHANGED    Details   citalopram (CELEXA) 20 MG tablet Take 1 tablet (20 mg) by mouth daily  Qty: 30 tablet, Refills: 0    Associated Diagnoses: Schizoaffective disorder, depressive type (H)      !! haloperidol (HALDOL) 5 MG tablet Take 1 tablet (5 mg) by mouth 2 times daily  Qty: 60 tablet, Refills: 0    Associated Diagnoses: Schizoaffective disorder, depressive type (H)      !! haloperidol (HALDOL) 5 MG tablet Take 1 tablet (5 mg) by mouth every 12 hours as needed for agitation  Qty: 30 tablet, Refills: 0    Associated Diagnoses: Schizoaffective disorder, depressive type (H)       !! - Potential duplicate medications found. Please discuss with provider.      CONTINUE these medications which have NOT CHANGED    Details   insulin aspart (NOVOLOG FLEXPEN) 100 UNIT/ML injection Inject 20 Units Subcutaneous 3 times daily (with meals) Once daily, can add additional 5 units if BGs are >500mg/dL.  Qty: 15 mL, Refills: 3    Associated Diagnoses: Type 2 diabetes mellitus with mild nonproliferative  retinopathy without macular edema, with long-term current use of insulin, unspecified laterality (H)      losartan (COZAAR) 100 MG tablet TAKE 1 TABLET (100MG) BY MOUTH DAILY  Qty: 28 tablet, Refills: 3    Comments: Maximum Refills Reached  Associated Diagnoses: Coronary artery disease involving native heart with angina pectoris, unspecified vessel or lesion type (H)      gabapentin (NEURONTIN) 300 MG capsule TAKE 2 CAPSULES (600MG) BY MOUTH THREE TIMES A DAY  Qty: 168 capsule, Refills: 3    Associated Diagnoses: Type 2 diabetes mellitus with diabetic neuropathy, with long-term current use of insulin (H)      insulin glargine (LANTUS) 100 UNIT/ML injection Inject 45 Units Subcutaneous At Bedtime  Qty: 3 mL, Refills: 3    Associated Diagnoses: 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, with long-term current use of insulin, unspecified laterality (H)      vitamin D3 (CHOLECALCIFEROL) 83774 UNITS capsule TAKE 1 CAPSULE (50,000 UNITS) BY MOUTH ONCE A WEEK  Qty: 4 capsule, Refills: 3    Comments: Maximum Refills Reached  Associated Diagnoses: Vitamin D deficiency      albuterol (PROAIR HFA/PROVENTIL HFA/VENTOLIN HFA) 108 (90 BASE) MCG/ACT Inhaler Inhale 2 puffs into the lungs every 6 hours as needed for shortness of breath / dyspnea or wheezing  Qty: 3 Inhaler, Refills: 1    Associated Diagnoses: Dyspnea on exertion      BENZTROPINE MESYLATE PO Take 1 mg by mouth 2 times daily      ACETAMINOPHEN PO Take 1,000 mg by mouth every 6 hours as needed for pain or fever      atorvastatin (LIPITOR) 80 MG tablet TAKE 1 TABLET (80MG) BY MOUTH DAILY  Qty: 28 tablet, Refills: 11    Comments: Maximum Refills Reached  Associated Diagnoses: Hyperlipidemia LDL goal <100      metoprolol (TOPROL-XL) 100 MG 24 hr tablet TAKE 1 TABLET (100MG) BY MOUTH DAILY  Qty: 28 tablet, Refills: 11    Comments: Maximum Refills Reached  Associated Diagnoses: Coronary artery disease involving native heart with  angina pectoris, unspecified vessel or lesion type (H)      polyethylene glycol (MIRALAX/GLYCOLAX) powder TAKE 17 GRAMS (1 CAPFUL) BY MOUTH DAILY  Qty: 527 g, Refills: 3    Comments: Maximum Refills Reached  Associated Diagnoses: Constipation, unspecified constipation type      ranitidine (ZANTAC) 300 MG tablet TAKE 1 TABLET (300MG) BY MOUTH AT BEDTIME  Qty: 28 tablet, Refills: 3    Comments: Maximum Refills Reached  Associated Diagnoses: Gastroesophageal reflux disease without esophagitis      aspirin (ASPIRIN LOW DOSE) 81 MG EC tablet Take 1 tablet (81 mg) by mouth daily  Qty: 100 tablet, Refills: 4    Associated Diagnoses: Coronary artery disease involving native heart with angina pectoris, unspecified vessel or lesion type (H)      ibuprofen (ADVIL,MOTRIN) 600 MG tablet Take 1 tablet (600 mg) by mouth every 4 hours as needed for moderate pain  Qty: 60 tablet, Refills: 0    Associated Diagnoses: Closed fracture of one rib of right side, initial encounter      melatonin 5 MG CAPS Take 1 capsule by mouth daily At dinnertime  Qty: 90 capsule, Refills: 1    Associated Diagnoses: Insomnia, unspecified type      !! order for DME Diabetic Shoes  Qty: 2 each, Refills: 0    Associated Diagnoses: 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, with long-term current use of insulin, unspecified laterality (H)      chlorthalidone (HYGROTON) 25 MG tablet Take 1 tablet (25 mg) by mouth daily  Qty: 30 tablet, Refills: 3    Associated Diagnoses: HTN, goal below 140/90      blood glucose monitoring (ONETOUCH VERIO IQ SYSTEM) meter device kit Use to test blood sugar 2 times daily or as directed.  Ok to substitute alternative if insurance prefers.  Qty: 1 kit, Refills: 0    Associated Diagnoses: Type 2 diabetes mellitus with diabetic neuropathy, with long-term current use of insulin (H)      !! blood glucose monitoring (ONE TOUCH VERIO IQ) test strip Use to test blood sugar 2 times daily or as  directed.  Ok to substitute alternative if insurance prefers.  Qty: 150 strip, Refills: 11    Associated Diagnoses: Type 2 diabetes mellitus with diabetic neuropathy, with long-term current use of insulin (H)      blood glucose monitoring (ONE TOUCH DELICA) lancets Use to test blood sugar 2 times daily or as directed.  Ok to substitute alternative if insurance prefers.  Qty: 150 each, Refills: 11    Associated Diagnoses: Type 2 diabetes mellitus with diabetic neuropathy, with long-term current use of insulin (H)      senna-docusate (SENOKOT-S;PERICOLACE) 8.6-50 MG per tablet Take 1 tablet by mouth 2 times daily as needed for constipation      !! ONE TOUCH ULTRA test strip TEST TWICE DAILY AS DIRECTED  Qty: 150 strip, Refills: 8    Comments: **Patient requests 90 days supply**  Associated Diagnoses: Type 2 diabetes mellitus with diabetic neuropathy, with long-term current use of insulin (H)      TRULICITY 1.5 MG/0.5ML pen INJECT 1.5MG SUBCUTANEOUSLY EVERY SEVEN DAYS  Qty: 2 mL, Refills: 11    Associated Diagnoses: 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, with long-term current use of insulin, unspecified laterality (H)      ULTICARE MINI 31G X 6 MM insulin pen needle USE 4 DAILY OR AS DIRECTED  Qty: 100 each, Refills: 11    Comments: Refill Too Soon  Associated Diagnoses: 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, with long-term current use of insulin, unspecified laterality (H)      glucose 4 G CHEW Take 2 every 15 minutes for blood sugar <70mg/dL. Recheck blood sugar every 15 minutes until above 70mg/dL, then eat a substantial meal.  Qty: 20 tablet, Refills: 1    Associated Diagnoses: 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, with long-term current use of insulin, unspecified laterality (H)      !! order for DME Hold Novolog if meals are refused.  Qty: 1 each, Refills: 0     Associated Diagnoses: 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, with long-term current use of insulin, unspecified laterality (H)      !! order for DME Equipment being ordered: Rolling walker  Qty: 1 Device, Refills: 0    Associated Diagnoses: Falls frequently       !! - Potential duplicate medications found. Please discuss with provider.               Psychiatric and Physical Examinations:   Appearance:  awake, alert, adequately groomed and no apparent distress  Attitude:  cooperative  Eye Contact:  good  Mood:  better and good  Affect:  appropriate and in normal range, mood congruent and full range  Speech:  clear, coherent and normal prosody  Psychomotor Behavior:  no evidence of tardive dyskinesia, dystonia, or tics and intact station, gait and muscle tone  Thought Process:  linear and goal oriented  Associations:  no loose associations  Thought Content:  no evidence of suicidal ideation or homicidal ideation and no evidence of psychotic thought  Insight:  fair  Judgment:  intact  Oriented to:  time, person, and place  Attention Span and Concentration:  intact  Recent and Remote Memory:  intact  Language and Fund of Knowledge: low-normal  Muscle Strength and Tone: normal  Gait and Station: Normal  Vitals:    11/08/17 2108 11/09/17 0843 11/09/17 1956 11/10/17 0844   BP: 110/58 106/83 125/55 149/54   Pulse: 77 69 81 76   Resp: 16 16 16 16   Temp: 97.1  F (36.2  C) 96.7  F (35.9  C)  97.3  F (36.3  C)   TempSrc: Tympanic Tympanic  Tympanic   SpO2:       Weight:  104.4 kg (230 lb 1.6 oz)     Height:              Discharge Plan:     Follow up Appointment:  Health Care Follow-up Appointments:   Psychiatrist:  Hillary Winkelmann - Wednesday, November 15th at 2:00 pm  [facility]  [address] [place], [place] [id number] [phone]      Therapist:  Liliana Miller - Tuesday, November 28th at 12:00 pm  [facility]  [address] [place]    [phone]        Attestation:  The patient has been seen and evaluated by me,  Wade Siddiqi MD

## 2017-11-17 DIAGNOSIS — K21.9 GASTROESOPHAGEAL REFLUX DISEASE WITHOUT ESOPHAGITIS: ICD-10-CM

## 2017-11-17 NOTE — TELEPHONE ENCOUNTER
11/3/2017 - last office visit    Prescription approved per [facility] Refill Protocol.    Signed Prescriptions:                        Disp   Refills    ranitidine (ZANTAC) 300 MG tablet          90 tab*1        Sig: TAKE 1 TABLET (300MG) BY MOUTH AT BEDTIME  Authorizing Provider: MICHAELLE CALIXTO  Ordering User: PATI GARCIA      Closing encounter - no further actions needed at this time    Pati Garcia RN

## 2017-11-22 ENCOUNTER — TELEPHONE (OUTPATIENT)
Dept: FAMILY MEDICINE | Facility: CLINIC | Age: 57
End: 2017-11-22

## 2017-11-22 DIAGNOSIS — E11.3299 TYPE 2 DIABETES MELLITUS WITH MILD NONPROLIFERATIVE RETINOPATHY WITHOUT MACULAR EDEMA, WITH LONG-TERM CURRENT USE OF INSULIN, UNSPECIFIED LATERALITY (H): ICD-10-CM

## 2017-11-22 DIAGNOSIS — Z79.4 TYPE 2 DIABETES MELLITUS WITH MILD NONPROLIFERATIVE RETINOPATHY WITHOUT MACULAR EDEMA, WITH LONG-TERM CURRENT USE OF INSULIN, UNSPECIFIED LATERALITY (H): ICD-10-CM

## 2017-11-22 NOTE — TELEPHONE ENCOUNTER
[facility] is requesting a refill on LANTUS SOLOSTAR INJ. Directions: inject 45 units subcutaneously every night at bedtime.     Val Valenzuela

## 2017-11-27 ENCOUNTER — OFFICE VISIT (OUTPATIENT)
Dept: BEHAVIORAL HEALTH | Facility: CLINIC | Age: 57
End: 2017-11-27
Payer: MEDICARE

## 2017-11-27 ENCOUNTER — OFFICE VISIT (OUTPATIENT)
Dept: FAMILY MEDICINE | Facility: CLINIC | Age: 57
End: 2017-11-27
Payer: MEDICARE

## 2017-11-27 VITALS
HEART RATE: 74 BPM | TEMPERATURE: 98.9 F | DIASTOLIC BLOOD PRESSURE: 72 MMHG | SYSTOLIC BLOOD PRESSURE: 100 MMHG | RESPIRATION RATE: 16 BRPM | BODY MASS INDEX: 45.35 KG/M2 | OXYGEN SATURATION: 96 % | HEIGHT: 60 IN | WEIGHT: 231 LBS

## 2017-11-27 DIAGNOSIS — F25.1 SCHIZOAFFECTIVE DISORDER, DEPRESSIVE TYPE (H): Primary | ICD-10-CM

## 2017-11-27 DIAGNOSIS — F33.3 SEVERE EPISODE OF RECURRENT MAJOR DEPRESSIVE DISORDER, WITH PSYCHOTIC FEATURES (H): Primary | ICD-10-CM

## 2017-11-27 DIAGNOSIS — Z79.4 TYPE 2 DIABETES MELLITUS WITH MILD NONPROLIFERATIVE RETINOPATHY WITHOUT MACULAR EDEMA, WITH LONG-TERM CURRENT USE OF INSULIN, UNSPECIFIED LATERALITY (H): ICD-10-CM

## 2017-11-27 DIAGNOSIS — F32.1 MODERATE MAJOR DEPRESSION (H): ICD-10-CM

## 2017-11-27 DIAGNOSIS — R45.89 THOUGHTS OF SELF HARM: ICD-10-CM

## 2017-11-27 DIAGNOSIS — E66.01 MORBID OBESITY (H): ICD-10-CM

## 2017-11-27 DIAGNOSIS — E11.3299 TYPE 2 DIABETES MELLITUS WITH MILD NONPROLIFERATIVE RETINOPATHY WITHOUT MACULAR EDEMA, WITH LONG-TERM CURRENT USE OF INSULIN, UNSPECIFIED LATERALITY (H): ICD-10-CM

## 2017-11-27 LAB — GLUCOSE BLD-MCNC: 170 MG/DL (ref 70–99)

## 2017-11-27 PROCEDURE — 90834 PSYTX W PT 45 MINUTES: CPT | Performed by: SOCIAL WORKER

## 2017-11-27 PROCEDURE — 99215 OFFICE O/P EST HI 40 MIN: CPT | Performed by: NURSE PRACTITIONER

## 2017-11-27 PROCEDURE — 36415 COLL VENOUS BLD VENIPUNCTURE: CPT | Performed by: NURSE PRACTITIONER

## 2017-11-27 PROCEDURE — 82947 ASSAY GLUCOSE BLOOD QUANT: CPT | Performed by: NURSE PRACTITIONER

## 2017-11-27 NOTE — NURSING NOTE
Chief Complaint   Patient presents with     Diabetes     Mental Health Problem     Depression     Hypertension       Initial LMP 01/06/2015 Estimated body mass index is 44.94 kg/(m^2) as calculated from the following:    Height as of 11/6/17: 5' (1.524 m).    Weight as of 11/9/17: 230 lb 1.6 oz (104.4 kg).  Medication Reconciliation: complete   ARRON Salinas

## 2017-11-27 NOTE — PATIENT INSTRUCTIONS
-Increase Lantus to 57 units daily.  -Increase Novolog to 22 units with meal replacement.   -Blood glucose check in the lab.   -Follow-up in two weeks with PCP and [facility].

## 2017-11-27 NOTE — MR AVS SNAPSHOT
After Visit Summary   11/27/2017    Nena Tang    MRN: [id number]           Patient Information     Date Of Birth          1960        Visit Information        Provider Department      11/27/2017 1:00 PM [facility]        Today's Diagnoses     Schizoaffective disorder, depressive type (H)    -  1    Moderate major depression (H)           Follow-ups after your visit        Your next 10 appointments already scheduled     Dec 29, 2017  1:30 PM CST   Return Visit with ART Arias CNP   [facility] ([facility])    [address]  [address]  [facility] [address]   [phone]            Dec 29, 2017  1:30 PM CST   Return Visit with BIN Moon   [facility] ([facility])    [address]  [address]  [facility] [address]   [phone]            Jan 31, 2018  8:15 AM CST   RETURN RETINA with Tiburcio Chacon MD   [facility] ([facility])    Kiet Cotto [facility]  [address] [address]  [facility] [address]   [phone]              Who to contact     If you have questions or need follow up information about today's clinic visit or your schedule please contact [facility] directly at [phone].  Normal or non-critical lab and imaging results will be communicated to you by MyChart, letter or phone within 4 business days after the clinic has received the results. If you do not hear from us within 7 days, please contact the clinic through MyChart or phone. If you have a critical or abnormal lab result, we will notify you by phone as soon as possible.  Submit refill requests through [facility] or call your pharmacy and they will forward the refill request to us. Please allow 3 business days for your refill to be completed.           Additional Information About Your Visit        [employer] Information     [employer] gives you secure access to your electronic health record. If you see a primary care provider, you can also send messages to your care team and make appointments. If you have questions, please call your primary care clinic.  If you do not have a primary care provider, please call [phone] and they will assist you.        Care EveryWhere ID     This is your Care EveryWhere ID. This could be used by other organizations to access your [place] medical records  [id number]        Your Vitals Were     Last Period                   01/06/2015            Blood Pressure from Last 3 Encounters:   12/09/17 120/61   11/27/17 100/72   11/10/17 149/54    Weight from Last 3 Encounters:   12/09/17 225 lb (102.1 kg)   11/27/17 231 lb (104.8 kg)   11/09/17 230 lb 1.6 oz (104.4 kg)              Today, you had the following     No orders found for display         Today's Medication Changes          These changes are accurate as of: 11/27/17 11:59 PM.  If you have any questions, ask your nurse or doctor.               These medicines have changed or have updated prescriptions.        Dose/Directions    insulin aspart 100 UNIT/ML injection   Commonly known as:  NovoLOG FLEXPEN   This may have changed:  how much to take   Used for:  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, with long-term current use of insulin, unspecified laterality (H)   Changed by:  Rowena Haas APRN CNP        Dose:  22 Units   Inject 22 Units Subcutaneous 3 times daily (with meals) Once daily, can add additional 5 units if BGs are >500mg/dL.   Quantity:  15 mL   Refills:  11       insulin glargine 100 UNIT/ML injection   Commonly known as:  LANTUS   This may have changed:  how much to take   Used for:  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, with long-term current use of insulin,  unspecified laterality (H)   Changed by:  Rowena Haas APRN CNP        Dose:  57 Units   Inject 57 Units Subcutaneous At Bedtime   Quantity:  3 mL   Refills:  11            Where to get your medicines      These medications were sent to [address] [address]     Phone:  [phone]     insulin aspart 100 UNIT/ML injection    insulin glargine 100 UNIT/ML injection                Primary Care Provider Office Phone # Fax #    Rowena ART Blanton [phone]       [address] [facility] [address]  [facility] [address]        Goals        General    Medical (pt-stated)     Notes - Note created  7/7/2016  9:15 AM by Chelsea Pulido RN    Get blood sugars under control    Improve medication compliance    As of today's date 7/7/2016 goal is met at 0 - 25%.   Goal Status:  Active          Equal Access to Services     [facility] AH: Hadii aad ku hadasho Soomaali, waaxda luqadaha, qaybta kaalmada adeegyada, waxay idiin hayaan adeeg kharash la'aafredi . So [facility] [phone].    ATENCIÓN: Si habla español, tiene a trinh disposición servicios gratuitos de asistencia lingüística. Llame al [phone].    We comply with applicable federal civil rights laws and [place] laws. We do not discriminate on the basis of race, color, national origin, age, disability, sex, sexual orientation, or gender identity.            Thank you!     Thank you for choosing [facility]  for your care. Our goal is always to provide you with excellent care. Hearing back from our patients is one way we can continue to improve our services. Please take a few minutes to complete the written survey that you may receive in the mail after your visit with us. Thank you!             Your Updated Medication List - Protect others around you: Learn how to safely use, store and throw away your medicines at  www.disposemymeds.org.          This list is accurate as of: 11/27/17 11:59 PM.  Always use your most recent med list.                   Brand Name Dispense Instructions for use Diagnosis    ACETAMINOPHEN PO      Take 1,000 mg by mouth every 6 hours as needed for pain or fever        albuterol 108 (90 BASE) MCG/ACT Inhaler    PROAIR HFA/PROVENTIL HFA/VENTOLIN HFA    3 Inhaler    Inhale 2 puffs into the lungs every 6 hours as needed for shortness of breath / dyspnea or wheezing    Dyspnea on exertion       aspirin 81 MG EC tablet    ASPIRIN LOW DOSE    100 tablet    Take 1 tablet (81 mg) by mouth daily    Coronary artery disease involving native heart with angina pectoris, unspecified vessel or lesion type (H)       atorvastatin 80 MG tablet    LIPITOR    28 tablet    TAKE 1 TABLET (80MG) BY MOUTH DAILY    Hyperlipidemia LDL goal <100       blood glucose monitoring lancets     150 each    Use to test blood sugar 2 times daily or as directed.  Ok to substitute alternative if insurance prefers.    Type 2 diabetes mellitus with diabetic neuropathy, with long-term current use of insulin (H)       blood glucose monitoring meter device kit     1 kit    Use to test blood sugar 2 times daily or as directed.  Ok to substitute alternative if insurance prefers.    Type 2 diabetes mellitus with diabetic neuropathy, with long-term current use of insulin (H)       chlorthalidone 25 MG tablet    HYGROTON    30 tablet    Take 1 tablet (25 mg) by mouth daily    HTN, goal below 140/90       gabapentin 300 MG capsule    NEURONTIN    168 capsule    TAKE 2 CAPSULES (600MG) BY MOUTH THREE TIMES A DAY    Type 2 diabetes mellitus with diabetic neuropathy, with long-term current use of insulin (H)       glucose 4 G Chew chewable tablet     20 tablet    Take 2 every 15 minutes for blood sugar <70mg/dL. Recheck blood sugar every 15 minutes until above 70mg/dL, then eat a substantial meal.    Type 2 diabetes mellitus with mild nonproliferative  retinopathy without macular edema, with long-term current use of insulin, unspecified laterality (H)       * haloperidol 5 MG tablet    HALDOL    60 tablet    Take 1 tablet (5 mg) by mouth 2 times daily    Schizoaffective disorder, depressive type (H)       * haloperidol 5 MG tablet    HALDOL    30 tablet    Take 1 tablet (5 mg) by mouth every 12 hours as needed for agitation    Schizoaffective disorder, depressive type (H)       ibuprofen 600 MG tablet    ADVIL/MOTRIN    60 tablet    Take 1 tablet (600 mg) by mouth every 4 hours as needed for moderate pain    Closed fracture of one rib of right side, initial encounter       insulin aspart 100 UNIT/ML injection    NovoLOG FLEXPEN    15 mL    Inject 22 Units Subcutaneous 3 times daily (with meals) Once daily, can add additional 5 units if BGs are >500mg/dL.    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, with long-term current use of insulin, unspecified laterality (H)       losartan 100 MG tablet    COZAAR    28 tablet    TAKE 1 TABLET (100MG) BY MOUTH DAILY    Coronary artery disease involving native heart with angina pectoris, unspecified vessel or lesion type (H)       melatonin 5 MG Caps     90 capsule    Take 1 capsule by mouth daily At dinnertime    Insomnia, unspecified type       metoprolol 100 MG 24 hr tablet    TOPROL-XL    28 tablet    TAKE 1 TABLET (100MG) BY MOUTH DAILY    Coronary artery disease involving native heart with angina pectoris, unspecified vessel or lesion type (H)       * ONETOUCH ULTRA test strip   Generic drug:  blood glucose monitoring     150 strip    TEST TWICE DAILY AS DIRECTED    Type 2 diabetes mellitus with diabetic neuropathy, with  long-term current use of insulin (H)       * blood glucose monitoring test strip    ONETOUCH VERIO IQ    150 strip    Use to test blood sugar 2 times daily or as directed.  Ok to substitute alternative if insurance prefers.    Type 2 diabetes mellitus with diabetic neuropathy, with long-term current use of insulin (H)       * order for DME     1 Device    Equipment being ordered: Rolling walker    Falls frequently       * order for DME     1 each    Hold Novolog if meals are refused.    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, with long-term current use of insulin, unspecified laterality (H)       polyethylene glycol powder    MIRALAX/GLYCOLAX    527 g    TAKE 17 GRAMS (1 CAPFUL) BY MOUTH DAILY    Constipation, unspecified constipation type       ranitidine 300 MG tablet    ZANTAC    90 tablet    TAKE 1 TABLET (300MG) BY MOUTH AT BEDTIME    Gastroesophageal reflux disease without esophagitis       senna-docusate 8.6-50 MG per tablet    SENOKOT-S;PERICOLACE     Take 1 tablet by mouth 2 times daily as needed for constipation        SERTRALINE HCL PO      Take 50 mg by mouth daily        TRULICITY 1.5 MG/0.5ML pen   Generic drug:  dulaglutide     2 mL    INJECT 1.5MG SUBCUTANEOUSLY EVERY SEVEN DAYS    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, with long-term current use of insulin, unspecified laterality (H)       ULTICARE MINI 31G X 6 MM   Generic drug:  insulin pen needle     100 each    USE 4 DAILY OR AS DIRECTED    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, with long-term current use of insulin, unspecified laterality (H)       vitamin D3 23722 UNITS capsule    CHOLECALCIFEROL     4 capsule    TAKE 1 CAPSULE (50,000 UNITS) BY MOUTH ONCE A WEEK    Vitamin D deficiency       * Notice:  This list has 7 medication(s) that are the same as other medications prescribed for you. Read the directions carefully, and ask your doctor or other care provider to review them with you.

## 2017-11-27 NOTE — MR AVS SNAPSHOT
After Visit Summary   11/27/2017    Nena Tang    MRN: [id number]           Patient Information     Date Of Birth          1960        Visit Information        Provider Department      11/27/2017 1:00 PM Rowena Haas APRN CNP [facility]        Today's Diagnoses     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, with long-term current use of insulin, unspecified laterality (H)          Care Instructions    -Increase Lantus to 57 units daily.  -Increase Novolog to 22 units with meal replacement.   -Blood glucose check in the lab.   -Follow-up in two weeks with PCP and [facility].           Follow-ups after your visit        Your next 10 appointments already scheduled     Nov 30, 2017  8:00 AM CST   RETURN RETINA with Tiburcio Chacon MD   Eye Clinic ([facility])    [facility]  [address]  [address] [address]  [facility] [address] [phone]              Who to contact     If you have questions or need follow up information about today's clinic visit or your schedule please contact [facility] PRIMARY [facility] directly at [phone].  Normal or non-critical lab and imaging results will be communicated to you by MyChart, letter or phone within 4 business days after the clinic has received the results. If you do not hear from us within 7 days, please contact the clinic through [employer] or phone. If you have a critical or abnormal lab result, we will notify you by phone as soon as possible.  Submit refill requests through [employer] or call your pharmacy and they will forward the refill request to us. Please allow 3 business days for your refill to be completed.          Additional Information About Your Visit        MyChart Information     [employer] gives you secure access to your electronic health record. If you see a primary care provider, you can also send messages to  your care team and make appointments. If you have questions, please call your primary care clinic.  If you do not have a primary care provider, please call [phone] and they will assist you.        Care EveryWhere ID     This is your Care EveryWhere ID. This could be used by other organizations to access your [place] medical records  [id number]        Your Vitals Were     Pulse Temperature Respirations Height Last Period Pulse Oximetry    74 98.9  F (37.2  C) (Oral) 16 5' (1.524 m) 01/06/2015 96%    BMI (Body Mass Index)                   45.11 kg/m2            Blood Pressure from Last 3 Encounters:   11/27/17 100/72   11/10/17 149/54   11/03/17 100/56    Weight from Last 3 Encounters:   11/27/17 231 lb (104.8 kg)   11/09/17 230 lb 1.6 oz (104.4 kg)   11/03/17 227 lb (103 kg)              We Performed the Following     Basic metabolic panel  (Ca, Cl, CO2, Creat, Gluc, K, Na, BUN)          Today's Medication Changes          These changes are accurate as of: 11/27/17  2:21 PM.  If you have any questions, ask your nurse or doctor.               These medicines have changed or have updated prescriptions.        Dose/Directions    insulin aspart 100 UNIT/ML injection   Commonly known as:  NovoLOG FLEXPEN   This may have changed:  how much to take   Used for:  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, with long-term current use of insulin, unspecified laterality (H)   Changed by:  Rowena Haas APRN CNP        Dose:  22 Units   Inject 22 Units Subcutaneous 3 times daily (with meals) Once daily, can add additional 5 units if BGs are >500mg/dL.   Quantity:  15 mL   Refills:  11       insulin glargine 100 UNIT/ML injection   Commonly known as:  LANTUS   This may have changed:  how much to take   Used for:  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, with long-term current use of insulin, unspecified laterality (H)    Changed by:  Rowena Haas APRN CNP        Dose:  57 Units   Inject 57 Units Subcutaneous At Bedtime   Quantity:  3 mL   Refills:  11            Where to get your medicines      These medications were sent to [address] [address]     Phone:  [phone]     insulin aspart 100 UNIT/ML injection    insulin glargine 100 UNIT/ML injection                Primary Care Provider Office Phone # Fax #    ART Wetzel [phone] [address] [facility] [address]  [facility] [address]        Goals        General    Medical (pt-stated)     Notes - Note created  7/7/2016  9:15 AM by Chelsea Pulido RN    Get blood sugars under control    Improve medication compliance    As of today's date 7/7/2016 goal is met at 0 - 25%.   Goal Status:  Active          Equal Access to Services     [facility] AH: Hadii aad ku hadasho Soomaali, waaxda luqadaha, qaybta kaalmada adeegyada, waxay idiin hayxiomaran yahir ellison . So [facility] [phone].    ATENCIÓN: Si habla español, tiene a trinh disposición servicios gratuitos de asistencia lingüística. Llame al [phone].    We comply with applicable federal civil rights laws and [place] laws. We do not discriminate on the basis of race, color, national origin, age, disability, sex, sexual orientation, or gender identity.            Thank you!     Thank you for choosing [facility] PRIMARY CARE  for your care. Our goal is always to provide you with excellent care. Hearing back from our patients is one way we can continue to improve our services. Please take a few minutes to complete the written survey that you may receive in the mail after your visit with us. Thank you!             Your Updated Medication List - Protect others around you: Learn how to safely use, store and throw away your medicines at www.disposemymeds.org.          This list  is accurate as of: 11/27/17  2:21 PM.  Always use your most recent med list.                   Brand Name Dispense Instructions for use Diagnosis    ACETAMINOPHEN PO      Take 1,000 mg by mouth every 6 hours as needed for pain or fever        albuterol 108 (90 BASE) MCG/ACT Inhaler    PROAIR HFA/PROVENTIL HFA/VENTOLIN HFA    3 Inhaler    Inhale 2 puffs into the lungs every 6 hours as needed for shortness of breath / dyspnea or wheezing    Dyspnea on exertion       aspirin 81 MG EC tablet    ASPIRIN LOW DOSE    100 tablet    Take 1 tablet (81 mg) by mouth daily    Coronary artery disease involving native heart with angina pectoris, unspecified vessel or lesion type (H)       atorvastatin 80 MG tablet    LIPITOR    28 tablet    TAKE 1 TABLET (80MG) BY MOUTH DAILY    Hyperlipidemia LDL goal <100       blood glucose monitoring lancets     150 each    Use to test blood sugar 2 times daily or as directed.  Ok to substitute alternative if insurance prefers.    Type 2 diabetes mellitus with diabetic neuropathy, with long-term current use of insulin (H)       blood glucose monitoring meter device kit     1 kit    Use to test blood sugar 2 times daily or as directed.  Ok to substitute alternative if insurance prefers.    Type 2 diabetes mellitus with diabetic neuropathy, with long-term current use of insulin (H)       chlorthalidone 25 MG tablet    HYGROTON    30 tablet    Take 1 tablet (25 mg) by mouth daily    HTN, goal below 140/90       gabapentin 300 MG capsule    NEURONTIN    168 capsule    TAKE 2 CAPSULES (600MG) BY MOUTH THREE TIMES A DAY    Type 2 diabetes mellitus with diabetic neuropathy, with long-term current use of insulin (H)       glucose 4 G Chew chewable tablet     20 tablet    Take 2 every 15 minutes for blood sugar <70mg/dL. Recheck blood sugar every 15 minutes until above 70mg/dL, then eat a substantial meal.    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, with  long-term current use of insulin, unspecified laterality (H)       * haloperidol 5 MG tablet    HALDOL    60 tablet    Take 1 tablet (5 mg) by mouth 2 times daily    Schizoaffective disorder, depressive type (H)       * haloperidol 5 MG tablet    HALDOL    30 tablet    Take 1 tablet (5 mg) by mouth every 12 hours as needed for agitation    Schizoaffective disorder, depressive type (H)       ibuprofen 600 MG tablet    ADVIL/MOTRIN    60 tablet    Take 1 tablet (600 mg) by mouth every 4 hours as needed for moderate pain    Closed fracture of one rib of right side, initial encounter       insulin aspart 100 UNIT/ML injection    NovoLOG FLEXPEN    15 mL    Inject 22 Units Subcutaneous 3 times daily (with meals) Once daily, can add additional 5 units if BGs are >500mg/dL.    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, with long-term current use of insulin, unspecified laterality (H)       losartan 100 MG tablet    COZAAR    28 tablet    TAKE 1 TABLET (100MG) BY MOUTH DAILY    Coronary artery disease involving native heart with angina pectoris, unspecified vessel or lesion type (H)       melatonin 5 MG Caps     90 capsule    Take 1 capsule by mouth daily At dinnertime    Insomnia, unspecified type       metoprolol 100 MG 24 hr tablet    TOPROL-XL    28 tablet    TAKE 1 TABLET (100MG) BY MOUTH DAILY    Coronary artery disease involving native heart with angina pectoris, unspecified vessel or lesion type (H)       * ONETOUCH ULTRA test strip   Generic drug:  blood glucose monitoring     150 strip    TEST TWICE DAILY AS DIRECTED    Type 2 diabetes mellitus with diabetic neuropathy, with long-term current use of insulin (H)        * blood glucose monitoring test strip    ONETOUCH VERIO IQ    150 strip    Use to test blood sugar 2 times daily or as directed.  Ok to substitute alternative if insurance prefers.    Type 2 diabetes mellitus with diabetic neuropathy, with long-term current use of insulin (H)       * order for DME     1 Device    Equipment being ordered: Rolling walker    Falls frequently       * order for DME     1 each    Hold Novolog if meals are refused.    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, with long-term current use of insulin, unspecified laterality (H)       polyethylene glycol powder    MIRALAX/GLYCOLAX    527 g    TAKE 17 GRAMS (1 CAPFUL) BY MOUTH DAILY    Constipation, unspecified constipation type       ranitidine 300 MG tablet    ZANTAC    90 tablet    TAKE 1 TABLET (300MG) BY MOUTH AT BEDTIME    Gastroesophageal reflux disease without esophagitis       senna-docusate 8.6-50 MG per tablet    SENOKOT-S;PERICOLACE     Take 1 tablet by mouth 2 times daily as needed for constipation        SERTRALINE HCL PO      Take 50 mg by mouth daily        TRULICITY 1.5 MG/0.5ML pen   Generic drug:  dulaglutide     2 mL    INJECT 1.5MG SUBCUTANEOUSLY EVERY SEVEN DAYS    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, with long-term current use of insulin, unspecified laterality (H)       ULTICARE MINI 31G X 6 MM   Generic drug:  insulin pen needle     100 each    USE 4 DAILY OR AS DIRECTED    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, with long-term current use of insulin, unspecified laterality (H)       vitamin D3 32887 UNITS capsule    CHOLECALCIFEROL    4 capsule    TAKE 1 CAPSULE (50,000  UNITS) BY MOUTH ONCE A WEEK    Vitamin D deficiency       * Notice:  This list has 7 medication(s) that are the same as other medications prescribed for you. Read the directions carefully, and ask your doctor or other care provider to review them with you.

## 2017-11-27 NOTE — PROGRESS NOTES
SUBJECTIVE:                                                    Nena aTng is a 57 year old  female who presents to clinic today for the following health issues:  Patient accompanied by her caregiver Zheng.   [facility]: Hussein Diana (Intern)    Diabetes Follow-up  Patient states that her sugars have been high 200 in the am and 350's at night. Encouraged patient to monitor her food portioning and focus on having one serving instead of multiple servings at meal times.   Patient is checking blood sugars: twice daily.    Blood sugar testing frequency justification: Frequent hypoglycemia  Results are as follows:       am - Pt states sugars are okay on the morning.       bedtime - Pt states that her sugars are high in the evening        Diabetic concerns: None and blood sugar frequently over 200     Symptoms of hypoglycemia (low blood sugar): shaky, dizzy, blurred vision     Paresthesias (numbness or burning in feet) or sores: No     Date of last diabetic eye exam: Pt states it has been about 6 months ago.    Hypertension Follow-up    Outpatient blood pressures are not being checked.    Low Salt Diet: not monitoring salt but states sometimes she adds a lot of salt.  BP Readings from Last 3 Encounters:   11/27/17 100/72   11/10/17 149/54   11/03/17 100/56         Amount of exercise or physical activity: Walks as much as she can but bothers her legs sometimes.    Problems taking medications regularly: No    Medication side effects: Pt states that her left hand has become very shaky and sometimes drops things. Discussed medication side effect, and the changes that were made with her psychiatry. Patient following back with her psychiatrist next week for a follow up after the medication changes.   Diet: regular (no restrictions)    Mental Health Follow up Depression  Patient states that she is feeling more down because of having constant high blood sugars. Patient states that she has been having a tough time  and is tired of the ups and downs. Patient tearful for part of the visit. Patient states that she feels that her mood is going back to what it was prior to her being admitted to the ED. Patient states that she is still having suicidal ideation and thoughts and would like to go to the ED today. Patient states that she feels safe at her home and Zheng(caregiver) reports that he was not aware that she was not doing well because she has been happy and joyful at the house. Encouraged patient to let her caregivers now how she is feeling if and when she wants to hurt herself in order for them to call the crisis line or take her to the ED.   Patient reports going to the drop in center about 5 times per week.     Status since last visit: about the same. Patient still sad and depressed with the anniversary of her 's passing.     See PHQ-9 for current symptoms.  Other associated symptoms: None    Complicating factors: concern for self harm.   Significant life event:  No   Current substance abuse:  None  Anxiety or Panic symptoms:  No    Sleep - not as good. Patient reports seeing the man if she does not keep her night light on.  Appetite - good appetite. Reports some concern with the food portioning and some candy on the side that might be causing the increase in her blood sugar levels.   Exercise - walking, but not regularly.     Smoking - no  Alcohol - no  Street drugs - no  Marijuana - no  Caffeine - no    PHQ-9  English PHQ-9   Any Language               Problems taking medications regularly: No    Medication side effects: none    Diet: regular (no restrictions)  Social History   Substance Use Topics     Smoking status: Never Smoker     Smokeless tobacco: Never Used     Alcohol use No      Comment: last month        Problem list and histories reviewed & adjusted, as indicated.  Additional history: as documented.  Patient Active Problem List   Diagnosis     Osteopenia     Vitamin B12 deficiency without anemia      Hyperlipidemia LDL goal <100     Moderate major depression (H)     Rotator cuff syndrome     Type 2 diabetes mellitus with mild nonproliferative retinopathy (H)     Illiterate     Irritable bowel syndrome     overweight - BMI >35     Takotsubo cardiomyopathy     CAD (coronary artery disease)     Restless legs syndrome (RLS)     CINDY (obstructive sleep apnea)- mild AHI 10.3     Verbal auditory hallucination     Chronic low back pain     Schizoaffective disorder, depressive type (H)     Migraine headache     HTN, goal below 140/90     Health Care Home     Lumbago     Cervicalgia     Cocaine abuse, episodic     Suicidal ideation     Esophageal reflux     Mild nonproliferative diabetic retinopathy (H)     Tardive dyskinesia     Alcohol use     Left cataract     Falls frequently     History of uterine cancer     Depression     Psychophysiological insomnia     Dysuria     Asymptomatic postmenopausal status     Abdominal pain, right lower quadrant     Sepsis (H)     Pneumonia of right lower lobe due to infectious organism (H)     Infectious encephalopathy     Non-intractable vomiting with nausea     Acute respiratory failure with hypoxia (H)     Thoughts of self harm     Past Surgical History:   Procedure Laterality Date     C OOPHORECTORMY FOR RAHEL, W/BX  1983    UTERINE     CATARACT IOL, RT/LT Bilateral 2017     COLONOSCOPY N/A 3/16/2017    Procedure: COLONOSCOPY;  Surgeon: Traci Gonzalez MD;  Location:  GI     Coronary CTA  5/21/2014     HYSTERECTOMY  1983    uterine cancer yearly pap's per provider.     LAPAROSCOPIC CHOLECYSTECTOMY  2008     PHACOEMULSIFICATION CLEAR CORNEA WITH STANDARD INTRAOCULAR LENS IMPLANT Left 5/5/2017    Procedure: PHACOEMULSIFICATION CLEAR CORNEA WITH STANDARD INTRAOCULAR LENS IMPLANT;  LEFT EYE PHACOEMULSIFICATION CLEAR CORNEA WITH STANDARD INTRAOCULAR LENS IMPLANT ;  Surgeon: Tyra Diaz MD;  Location: [facility]     PHACOEMULSIFICATION CLEAR CORNEA WITH STANDARD INTRAOCULAR  LENS IMPLANT Right 2017    Procedure: PHACOEMULSIFICATION CLEAR CORNEA WITH STANDARD INTRAOCULAR LENS IMPLANT;  RIGHT EYE PHACOEMULSIFICATION CLEAR CORNEA WITH STANDARD INTRAOCULAR LENS IMPLANT;  Surgeon: Tyra Diaz MD;  Location:  [facility]     RELEASE TRIGGER FINGER  10/11/2012    Left thumb. Procedure: RELEASE TRIGGER FINGER;  LEFT THUMB TRIGGER RELEASE;  Surgeon: Tay Langley MD;  Location:  [facility]     RELEASE TRIGGER FINGER Right 2016    Procedure: RELEASE TRIGGER FINGER;  Surgeon: Albino Castañeda MD;  Location:  OR       Social History   Substance Use Topics     Smoking status: Never Smoker     Smokeless tobacco: Never Used     Alcohol use No      Comment: last month     Family History   Problem Relation Age of Onset     CANCER Mother      BLADDER     Respiratory Mother      COPD     GASTROINTESTINAL DISEASE Mother      CIRRHOSIS OF LI BOLIVAR     Alcohol/Drug Mother      DIABETES Mother      Hypertension Mother      Lipids Mother      C.A.D. Mother      Glaucoma Mother      Alcohol/Drug Sister      MENTAL ILLNESS Sister      Alcohol/Drug Sister      Psychotic Disorder Sister      CANCER Maternal Grandmother      UNKNOWN TYPE     CANCER Brother      COLON     Cancer - colorectal Brother      IN HIS LATE 30S     Alcohol/Drug Brother       OF HEROIN OVERDOSE AT AGE 22 YRS     Macular Degeneration No family hx of            Current Outpatient Prescriptions   Medication Sig Dispense Refill     insulin glargine (LANTUS) 100 UNIT/ML injection Inject 55 Units Subcutaneous At Bedtime 3 mL 3     ranitidine (ZANTAC) 300 MG tablet TAKE 1 TABLET (300MG) BY MOUTH AT BEDTIME 90 tablet 1     citalopram (CELEXA) 20 MG tablet Take 1 tablet (20 mg) by mouth daily 30 tablet 0     haloperidol (HALDOL) 5 MG tablet Take 1 tablet (5 mg) by mouth 2 times daily 60 tablet 0     haloperidol (HALDOL) 5 MG tablet Take 1 tablet (5 mg) by mouth every 12 hours as needed for agitation 30 tablet 0     order for DME  Diabetic Shoes 2 each 0     insulin aspart (NOVOLOG FLEXPEN) 100 UNIT/ML injection Inject 20 Units Subcutaneous 3 times daily (with meals) Once daily, can add additional 5 units if BGs are >500mg/dL. 15 mL 3     losartan (COZAAR) 100 MG tablet TAKE 1 TABLET (100MG) BY MOUTH DAILY 28 tablet 3     chlorthalidone (HYGROTON) 25 MG tablet Take 1 tablet (25 mg) by mouth daily 30 tablet 3     gabapentin (NEURONTIN) 300 MG capsule TAKE 2 CAPSULES (600MG) BY MOUTH THREE TIMES A  capsule 3     blood glucose monitoring (ONETOUCH VERIO IQ SYSTEM) meter device kit Use to test blood sugar 2 times daily or as directed.  Ok to substitute alternative if insurance prefers. 1 kit 0     blood glucose monitoring (ONE TOUCH VERIO IQ) test strip Use to test blood sugar 2 times daily or as directed.  Ok to substitute alternative if insurance prefers. 150 strip 11     blood glucose monitoring (ONE TOUCH DELICA) lancets Use to test blood sugar 2 times daily or as directed.  Ok to substitute alternative if insurance prefers. 150 each 11     vitamin D3 (CHOLECALCIFEROL) 13595 UNITS capsule TAKE 1 CAPSULE (50,000 UNITS) BY MOUTH ONCE A WEEK 4 capsule 3     albuterol (PROAIR HFA/PROVENTIL HFA/VENTOLIN HFA) 108 (90 BASE) MCG/ACT Inhaler Inhale 2 puffs into the lungs every 6 hours as needed for shortness of breath / dyspnea or wheezing 3 Inhaler 1     BENZTROPINE MESYLATE PO Take 1 mg by mouth 2 times daily       senna-docusate (SENOKOT-S;PERICOLACE) 8.6-50 MG per tablet Take 1 tablet by mouth 2 times daily as needed for constipation       ACETAMINOPHEN PO Take 1,000 mg by mouth every 6 hours as needed for pain or fever       atorvastatin (LIPITOR) 80 MG tablet TAKE 1 TABLET (80MG) BY MOUTH DAILY 28 tablet 11     metoprolol (TOPROL-XL) 100 MG 24 hr tablet TAKE 1 TABLET (100MG) BY MOUTH DAILY 28 tablet 11     polyethylene glycol (MIRALAX/GLYCOLAX) powder TAKE 17 GRAMS (1 CAPFUL) BY MOUTH DAILY 527 g 3     ONE TOUCH ULTRA test strip TEST  TWICE DAILY AS DIRECTED 150 strip 8     TRULICITY 1.5 MG/0.5ML pen INJECT 1.5MG SUBCUTANEOUSLY EVERY SEVEN DAYS 2 mL 11     ULTICARE MINI 31G X 6 MM insulin pen needle USE 4 DAILY OR AS DIRECTED 100 each 11     aspirin (ASPIRIN LOW DOSE) 81 MG EC tablet Take 1 tablet (81 mg) by mouth daily 100 tablet 4     ibuprofen (ADVIL,MOTRIN) 600 MG tablet Take 1 tablet (600 mg) by mouth every 4 hours as needed for moderate pain 60 tablet 0     melatonin 5 MG CAPS Take 1 capsule by mouth daily At dinnertime 90 capsule 1     glucose 4 G CHEW Take 2 every 15 minutes for blood sugar <70mg/dL. Recheck blood sugar every 15 minutes until above 70mg/dL, then eat a substantial meal. 20 tablet 1     order for DME [employer] if meals are refused. 1 each 0     order for DME Equipment being ordered: Rolling walker 1 Device 0     Allergies   Allergen Reactions     Imidazole Antifungals Hives     Tolerates diflucan     Ketoprofen Itching     Pruritis to topical     Lisinopril Hives     Metformin Other (See Comments)     Patient hospitalized for lactic acidosis - admitting provider suspectd caused by metformin     Metronidazole Hives     Posaconazole Hives     Tolerates diflucan     Recent Labs   Lab Test  11/07/17   0801  10/23/17   1515   08/30/17   0738   06/27/17   1358   12/29/16   0909   07/13/16   1350   07/14/15   1215   09/03/13   1156   A1C  8.9*  8.8*   --   7.6*   --   7.0*   < >   --    < >   --    < >  9.1*   < >  10.8*   LDL   --    --    --    --    --   64   --    --    --   137*   --   78   < >  90   HDL   --    --    --    --    --   37*   --    --    --    --    --   39*   --   37*   TRIG   --    --    --    --    --   267*   --    --    --    --    --   151*   --   171*   ALT  23  23   --   34   < >  32   < >  26   < >   --    < >   --    < >  38   CR  0.88  0.98   < >  0.88   < >  0.78   < >  0.72   < >   --    < >  0.67   < >  0.54   GFRESTIMATED  66  58*   < >  66   < >  76   < >  83   < >   --    < >  >90  Non   GFR Calc     < >  >90   GFRESTBLACK  80  71   < >  80   < >  >90   GFR Calc     < >  >90   GFR Calc     < >   --    < >  >90   GFR Calc     < >  >90   POTASSIUM  3.9  4.6   < >  4.0   < >  4.3   < >  4.2   < >   --    < >  4.3   < >  4.4   TSH  3.21   --    --    --    --    --    --   2.24   < >   --    < >   --    < >  1.42    < > = values in this interval not displayed.      BP Readings from Last 3 Encounters:   11/10/17 149/54   11/03/17 100/56   10/23/17 90/56    Wt Readings from Last 3 Encounters:   11/09/17 230 lb 1.6 oz (104.4 kg)   11/03/17 227 lb (103 kg)   10/23/17 226 lb (102.5 kg)        Labs reviewed in EPIC  Problem list, Medication list, Allergies, and Medical/Social/Surgical histories reviewed in [facility] and updated as appropriate.     ROS: Constitutional, neuro, ENT, endocrine, pulmonary, cardiac, gastrointestinal, genitourinary, musculoskeletal, integument and psychiatric systems are negative, except as otherwise noted above in the HPI.   OBJECTIVE:                                                    /72  Pulse 74  Temp 98.9  F (37.2  C) (Oral)  Resp 16  Ht 5' (1.524 m)  Wt 231 lb (104.8 kg)  LMP 01/06/2015  SpO2 96%  BMI 45.11 kg/m2  Body mass index is 45.11 kg/(m^2).  GENERAL: healthy, alert, well nourished, well hydrated, no distress  EYES: Eyes grossly normal to inspection, extraocular movements - intact, and PERRL  HENT: ear canals- normal; TMs- normal; Nose- normal; Mouth- no ulcers, no lesions  NECK: no tenderness, no adenopathy, no asymmetry, no masses, no stiffness; thyroid- normal to palpation  RESP: lungs clear to auscultation - no rales, no rhonchi, no wheezes  CV: regular rates and rhythm, normal S1 S2, no S3 or S4 and no murmur, no click or rub - no homans or cords  ABDOMEN: soft, no tenderness, unable to palpate organs due to body habitus, normal bowel sounds  MS: extremities- no gross deformities noted, no  edema  SKIN: no suspicious lesions, no rashes  NEURO: strength and tone- normal, sensory exam- grossly normal, mentation- intact, speech- normal, reflexes- symmetric  Non focal no aphasia. No facial asymmetry.   BACK: no CVA tenderness, no paralumbar tenderness  LYMPHATICS: ant. cervical- normal, post. cervical- normal, axillary- normal, supraclavicular- normal.    Mental Status Assessment:  Appearance:   Appropriate   Eye Contact:   Good   Psychomotor Behavior: Normal   Attitude:   Cooperative   Orientation:   All  Speech   Rate / Production: Normal    Volume:  Normal   Mood:    Sad  tearful   Affect:    Worrisome   Thought Content:  Clear   Thought Form:  Coherent   Insight:    Fair   Attention Span and Concentration:  limited  Recent and Remote Memory:  intact  Fund of Knowledge: appropriate  Muscle Strength and Tone: normal   Suicidal Ideation: reports thoughts, and intentions  Hallucination: Yes  Paranoid-No  Manic-No  Panic-No  Self harm-thoughts of self-harm.    See [facility] notes     Diagnostic Test Results:  Results for orders placed or performed in visit on 11/27/17   Glucose, whole blood   Result Value Ref Range    Glucose Whole Blood 170 (H) 70 - 99 mg/dL     *Note: Due to a large number of results and/or encounters for the requested time period, some results have not been displayed. A complete set of results can be found in Results Review.        ASSESSMENT/PLAN:                                                    (F33.3) Severe episode of recurrent major depressive disorder, with psychotic features (H)  (primary encounter diagnosis)  Comment: as noted above.   Plan: Continue with the new changes as recommended by psychiatry.   -follow up in 2 weeks or sooner if needed.   -Call [facility] line or go to the ED if patient has uncontrolled feelings of self-injury.     (E66.01) Morbid obesity (H)  Comment: Body mass index is 45.11 kg/(m^2).    Plan: Discussed food portioning with patient.   -Encouraged patient to  increase her activity level.   -Will refer patient to Bariatric clinic at the next office visit.     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   Plan: insulin glargine (LANTUS) 100 UNIT/ML         injection, insulin aspart (NOVOLOG FLEXPEN) 100        UNIT/ML injection, Glucose, whole blood,   -changed insulin doses.   -Discussed lifestyle modifications with patient and caregiver.   Lab Results   Component Value Date    A1C 8.9 11/07/2017    A1C 8.8 10/23/2017    A1C 7.6 08/30/2017    A1C 7.0 06/27/2017    A1C 7.2 03/28/2017       (R45.89) Thoughts of self harm  Comment: Patient states that these are still there. Patient states that the feeling got better when she was in the hospital and now they are back again.   Plan: Continue to assess patient.   -Patient is following up with her psychiatry next week and schedule to meet with me and [facility] in two weeks or sooner if needed.         Patient Instructions   -Increase Lantus to 57 units daily.  -Increase Novolog to 22 units with meal replacement.   -Blood glucose check in the lab.   -Follow-up in two weeks with PCP and [facility].       I spent Greater than 40 minutes was spent face to face with Nena Tang, with greater than 50 % in counselling and consultation about diabetes and weight management, depression and anxiety.     ART Fay CNP  [facility] PRIMARY CARE

## 2017-11-30 ENCOUNTER — OFFICE VISIT (OUTPATIENT)
Dept: OPHTHALMOLOGY | Facility: CLINIC | Age: 57
End: 2017-11-30
Attending: OPHTHALMOLOGY
Payer: MEDICARE

## 2017-11-30 DIAGNOSIS — Z79.4 TYPE 2 DIABETES MELLITUS WITH BOTH EYES AFFECTED BY MILD NONPROLIFERATIVE RETINOPATHY WITHOUT MACULAR EDEMA, WITH LONG-TERM CURRENT USE OF INSULIN (H): ICD-10-CM

## 2017-11-30 DIAGNOSIS — E11.3293 TYPE 2 DIABETES MELLITUS WITH BOTH EYES AFFECTED BY MILD NONPROLIFERATIVE RETINOPATHY WITHOUT MACULAR EDEMA, WITH LONG-TERM CURRENT USE OF INSULIN (H): ICD-10-CM

## 2017-11-30 PROCEDURE — 99213 OFFICE O/P EST LOW 20 MIN: CPT | Mod: ZF | Performed by: TECHNICIAN/TECHNOLOGIST

## 2017-11-30 PROCEDURE — 92134 CPTRZ OPH DX IMG PST SGM RTA: CPT | Mod: ZF | Performed by: OPHTHALMOLOGY

## 2017-11-30 ASSESSMENT — VISUAL ACUITY
OS_SC: 20/40
OD_SC: 20/40
OD_PH_SC: 20/30
OS_SC+: -2
OD_SC+: -1
METHOD: SNELLEN - LINEAR

## 2017-11-30 ASSESSMENT — CONF VISUAL FIELD
OD_NORMAL: 1
METHOD: COUNTING FINGERS
OS_NORMAL: 1

## 2017-11-30 ASSESSMENT — TONOMETRY
IOP_METHOD: ICARE
OD_IOP_MMHG: 19
OS_IOP_MMHG: 19

## 2017-11-30 NOTE — PROGRESS NOTES
I have confirmed the patient's CC, HPI and reviewed Past Medical History, Past Surgical History, Social History, Family History, Problem List, Medication List and agree with Tech note.    CC: Follow up Diabetes mellitus retinopathy  HPI: 56YO F Hx of DMII here for diabetic exam. DMII x 13 years. On oral and insulin. Last hgA1c 7.2% in 3/28/2017. Glucose under more control. Hoping to get cataract surgery with Dr. Diaz now.    OCT Macula 11/30/17  OD: center involving intra-retinal fluid temporal macula with interval worsening, no subretinal fluid, exudates  OS: temporal intraretinal fluid     Assessment/plan   1.  DMII: severe NPDR   - last HgA1c 7.2 3/2017 significantly improved (from 9 and 12 prior)    - emphasized importance of good blood glucose/blood pressure control     2. Diabetic macular edema both eyes   - center-involving right eye    - consider injections right eye and focal left eye    - closely observe for now with low threshold with injecitons    3. Myopia OS with secondary amblyopia              Does not wear glasses normally     4.  Pseudophakia both eyes     Return 3 months for OCT OU      Isaac Payan MD, PhD  Vitreoretinal Surgery Fellow    Attestation:  I have seen and examined the patient with Dr. Payan and agree with the findings in this note, as well as the interpretations of the diagnostic tests.      Tiburcio Martin MD PhD.  Professor & Chair

## 2017-11-30 NOTE — MR AVS SNAPSHOT
After Visit Summary   11/30/2017    Nena Tang    MRN: [id number]           Patient Information     Date Of Birth          1960        Visit Information        Provider Department      11/30/2017 8:00 AM [facility]urcio [facility]acon [facility]        Today's Diagnoses     Type 2 diabetes mellitus with both eyes affected by mild nonproliferative retinopathy without macular edema, with long-term current use of insulin (H) - Both Eyes           Follow-ups after your visit        Follow-up notes from your care team     Return in about 3 months (around 2/28/2018) for DFE, OCT Mac, Diabetic exam.      Your next 10 appointments already scheduled     Dec 11, 2017  1:00 PM CST   Return Visit with ART Wetzel CNP   [facility] ([facility])    [address]  [address]  [facility] [address]   [phone]            Dec 11, 2017  1:00 PM CST   Return Visit with BIN Mondragon   [facility] ([facility] [place] [facility])    [address]  [address]  [facility] [address]   [phone]            Dec 29, 2017  1:30 PM CST   Return Visit with ART Wetzel CNP   [facility] ([facility] [place] [facility])    [address]  [address]  [facility] [address]   [phone]            Dec 29, 2017  1:30 PM CST   Return Visit with BIN Mondragon   [facility] ([facility] [place] [facility])    [address]  [address]  [facility] [address]   [phone]            Jan 31, 2018  8:15 AM CST   RETURN RETINA with Tiburcio Chacon MD   [facility]    [address]  [address]  [facility] [address]   [phone]              Future tests that were ordered for you today     Open Future  Orders        Priority Expected Expires Ordered    OCT Retina Spectralis OU (both eyes) Routine  6/3/2019 11/30/2017            Who to contact     Please call your clinic at [phone] to:    Ask questions about your health    Make or cancel appointments    Discuss your medicines    Learn about your test results    Speak to your doctor   If you have compliments or concerns about an experience at your clinic, or if you wish to file a complaint, please contact [facility] Patient Relations at [phone] or email us at [email or web address]         Additional Information About Your Visit        [employer] Information     [employer] gives you secure access to your electronic health record. If you see a primary care provider, you can also send messages to your care team and make appointments. If you have questions, please call your primary care clinic.  If you do not have a primary care provider, please call [phone] and they will assist you.      [employer] is an electronic gateway that provides easy, online access to your medical records. With [employer], you can request a clinic appointment, read your test results, renew a prescription or communicate with your care team.     To access your existing account, please contact your [facility] or call [phone] for assistance.        Care EveryWhere ID     This is your Care EveryWhere ID. This could be used by other organizations to access your [facility] medical records  [id number]        Your Vitals Were     Last Period                   01/06/2015            Blood Pressure from Last 3 Encounters:   11/27/17 100/72   11/10/17 149/54   11/03/17 100/56    Weight from Last 3 Encounters:   11/27/17 104.8 kg (231 lb)   11/09/17 104.4 kg (230 lb 1.6 oz)   11/03/17 103 kg (227 lb)              We Performed the Following     OCT Retina Spectralis OU (both eyes)        Primary Care Provider Office Phone # Fax #     Rowena Haas, APRN [phone]       [address] [facility] [address]  [facility] [address]        Goals        General    Medical (pt-stated)     Notes - Note created  7/7/2016  9:15 AM by Chelsea Pulido RN    Get blood sugars under control    Improve medication compliance    As of today's date 7/7/2016 goal is met at 0 - 25%.   Goal Status:  Active          Equal Access to Services     EMAKIMBERLY HEMA : Hadii aad ku hadasho Soomaali, waaxda luqadaha, qaybta kaalmada adeegyada, waxay idiin hayaan adeeg kharash la'aan . So [facility] [phone].    ATENCIÓN: Si habla español, tiene a trinh disposición servicios gratuitos de asistencia lingüística. Llame al [phone].    We comply with applicable federal civil rights laws and [place] laws. We do not discriminate on the basis of race, color, national origin, age, disability, sex, sexual orientation, or gender identity.            Thank you!     Thank you for choosing [facility]  for your care. Our goal is always to provide you with excellent care. Hearing back from our patients is one way we can continue to improve our services. Please take a few minutes to complete the written survey that you may receive in the mail after your visit with us. Thank you!             Your Updated Medication List - Protect others around you: Learn how to safely use, store and throw away your medicines at www.disposemymeds.org.          This list is accurate as of: 11/30/17  9:39 AM.  Always use your most recent med list.                   Brand Name Dispense Instructions for use Diagnosis    ACETAMINOPHEN PO      Take 1,000 mg by mouth every 6 hours as needed for pain or fever        albuterol 108 (90 BASE) MCG/ACT Inhaler    PROAIR HFA/PROVENTIL HFA/VENTOLIN HFA    3 Inhaler    Inhale 2 puffs into the lungs every 6 hours as needed for shortness of breath / dyspnea or wheezing    Dyspnea on exertion       aspirin 81 MG EC tablet    ASPIRIN LOW DOSE    100 tablet    Take  1 tablet (81 mg) by mouth daily    Coronary artery disease involving native heart with angina pectoris, unspecified vessel or lesion type (H)       atorvastatin 80 MG tablet    LIPITOR    28 tablet    TAKE 1 TABLET (80MG) BY MOUTH DAILY    Hyperlipidemia LDL goal <100       blood glucose monitoring lancets     150 each    Use to test blood sugar 2 times daily or as directed.  Ok to substitute alternative if insurance prefers.    Type 2 diabetes mellitus with diabetic neuropathy, with long-term current use of insulin (H)       blood glucose monitoring meter device kit     1 kit    Use to test blood sugar 2 times daily or as directed.  Ok to substitute alternative if insurance prefers.    Type 2 diabetes mellitus with diabetic neuropathy, with long-term current use of insulin (H)       chlorthalidone 25 MG tablet    HYGROTON    30 tablet    Take 1 tablet (25 mg) by mouth daily    HTN, goal below 140/90       gabapentin 300 MG capsule    NEURONTIN    168 capsule    TAKE 2 CAPSULES (600MG) BY MOUTH THREE TIMES A DAY    Type 2 diabetes mellitus with diabetic neuropathy, with long-term current use of insulin (H)       glucose 4 G Chew chewable tablet     20 tablet    Take 2 every 15 minutes for blood sugar <70mg/dL. Recheck blood sugar every 15 minutes until above 70mg/dL, then eat a substantial meal.    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, with long-term current use of insulin, unspecified laterality (H)       * haloperidol 5 MG tablet    HALDOL    60 tablet    Take 1 tablet (5 mg) by mouth 2 times daily    Schizoaffective disorder, depressive type (H)       * haloperidol 5 MG tablet    HALDOL    30 tablet    Take 1 tablet (5 mg) by mouth every 12 hours as needed for agitation    Schizoaffective disorder, depressive type (H)       ibuprofen 600 MG tablet    ADVIL/MOTRIN    60 tablet    Take 1 tablet (600 mg) by mouth every 4 hours as needed for moderate pain    Closed  fracture of one rib of right side, initial encounter       insulin aspart 100 UNIT/ML injection    NovoLOG FLEXPEN    15 mL    Inject 22 Units Subcutaneous 3 times daily (with meals) Once daily, can add additional 5 units if BGs are >500mg/dL.    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, with long-term current use of insulin, unspecified laterality (H)       losartan 100 MG tablet    COZAAR    28 tablet    TAKE 1 TABLET (100MG) BY MOUTH DAILY    Coronary artery disease involving native heart with angina pectoris, unspecified vessel or lesion type (H)       melatonin 5 MG Caps     90 capsule    Take 1 capsule by mouth daily At dinnertime    Insomnia, unspecified type       metoprolol 100 MG 24 hr tablet    TOPROL-XL    28 tablet    TAKE 1 TABLET (100MG) BY MOUTH DAILY    Coronary artery disease involving native heart with angina pectoris, unspecified vessel or lesion type (H)       * ONETOUCH ULTRA test strip   Generic drug:  blood glucose monitoring     150 strip    TEST TWICE DAILY AS DIRECTED    Type 2 diabetes mellitus with diabetic neuropathy, with long-term current use of insulin (H)       * blood glucose monitoring test strip    ONETOUCH VERIO IQ    150 strip    Use to test blood sugar 2 times daily or as directed.  Ok to substitute alternative if insurance prefers.    Type 2 diabetes mellitus with diabetic neuropathy, with long-term current use of insulin (H)       * order for DME     1 Device    Equipment being ordered: Rolling walker    Falls frequently       * order for DME     1 each    Hold Novolog if meals are refused.    Type 2 diabetes mellitus with mild nonproliferative retinopathy without macular  edema, with long-term current use of insulin, unspecified laterality (H)       * order for DME     2 each    Diabetic Shoes    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, with long-term current use of insulin, unspecified laterality (H)       polyethylene glycol powder    MIRALAX/GLYCOLAX    527 g    TAKE 17 GRAMS (1 CAPFUL) BY MOUTH DAILY    Constipation, unspecified constipation type       ranitidine 300 MG tablet    ZANTAC    90 tablet    TAKE 1 TABLET (300MG) BY MOUTH AT BEDTIME    Gastroesophageal reflux disease without esophagitis       senna-docusate 8.6-50 MG per tablet    SENOKOT-S;PERICOLACE     Take 1 tablet by mouth 2 times daily as needed for constipation        SERTRALINE HCL PO      Take 50 mg by mouth daily        TRULICITY 1.5 MG/0.5ML pen   Generic drug:  dulaglutide     2 mL    INJECT 1.5MG SUBCUTANEOUSLY EVERY SEVEN DAYS    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, with long-term current use of insulin, unspecified laterality (H)       ULTICARE MINI 31G X 6 MM   Generic drug:  insulin pen needle     100 each    USE 4 DAILY OR AS DIRECTED    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 Read the directions carefully, and ask your doctor or other care provider to review them with you.

## 2017-12-07 ENCOUNTER — NURSE TRIAGE (OUTPATIENT)
Dept: NURSING | Facility: CLINIC | Age: 57
End: 2017-12-07

## 2017-12-07 ENCOUNTER — HOSPITAL ENCOUNTER (OUTPATIENT)
Facility: CLINIC | Age: 57
Setting detail: OBSERVATION
Discharge: NURSING, REHAB OR LONG-TERM CARE | End: 2017-12-09
Attending: EMERGENCY MEDICINE | Admitting: INTERNAL MEDICINE
Payer: MEDICARE

## 2017-12-07 ENCOUNTER — APPOINTMENT (OUTPATIENT)
Dept: GENERAL RADIOLOGY | Facility: CLINIC | Age: 57
End: 2017-12-07
Attending: EMERGENCY MEDICINE
Payer: MEDICARE

## 2017-12-07 ENCOUNTER — TELEPHONE (OUTPATIENT)
Dept: FAMILY MEDICINE | Facility: CLINIC | Age: 57
End: 2017-12-07

## 2017-12-07 DIAGNOSIS — E11.3299 TYPE 2 DIABETES MELLITUS WITH MILD NONPROLIFERATIVE RETINOPATHY WITHOUT MACULAR EDEMA, WITH LONG-TERM CURRENT USE OF INSULIN, UNSPECIFIED LATERALITY (H): ICD-10-CM

## 2017-12-07 DIAGNOSIS — R11.2 INTRACTABLE VOMITING WITH NAUSEA, UNSPECIFIED VOMITING TYPE: ICD-10-CM

## 2017-12-07 DIAGNOSIS — E16.2 HYPOGLYCEMIA: ICD-10-CM

## 2017-12-07 DIAGNOSIS — Z79.4 TYPE 2 DIABETES MELLITUS WITH MILD NONPROLIFERATIVE RETINOPATHY WITHOUT MACULAR EDEMA, WITH LONG-TERM CURRENT USE OF INSULIN, UNSPECIFIED LATERALITY (H): ICD-10-CM

## 2017-12-07 LAB
ANION GAP SERPL CALCULATED.3IONS-SCNC: 11 MMOL/L (ref 3–14)
BASOPHILS # BLD AUTO: 0 10E9/L (ref 0–0.2)
BASOPHILS NFR BLD AUTO: 0.3 %
BUN SERPL-MCNC: 24 MG/DL (ref 7–30)
CALCIUM SERPL-MCNC: 9.4 MG/DL (ref 8.5–10.1)
CHLORIDE SERPL-SCNC: 101 MMOL/L (ref 94–109)
CO2 SERPL-SCNC: 25 MMOL/L (ref 20–32)
CREAT SERPL-MCNC: 1.02 MG/DL (ref 0.52–1.04)
DIFFERENTIAL METHOD BLD: ABNORMAL
EOSINOPHIL # BLD AUTO: 0.1 10E9/L (ref 0–0.7)
EOSINOPHIL NFR BLD AUTO: 0.9 %
ERYTHROCYTE [DISTWIDTH] IN BLOOD BY AUTOMATED COUNT: 13 % (ref 10–15)
GFR SERPL CREATININE-BSD FRML MDRD: 56 ML/MIN/1.7M2
GLUCOSE BLDC GLUCOMTR-MCNC: 126 MG/DL (ref 70–99)
GLUCOSE BLDC GLUCOMTR-MCNC: 145 MG/DL (ref 70–99)
GLUCOSE BLDC GLUCOMTR-MCNC: 200 MG/DL (ref 70–99)
GLUCOSE BLDC GLUCOMTR-MCNC: 74 MG/DL (ref 70–99)
GLUCOSE SERPL-MCNC: 81 MG/DL (ref 70–99)
HCT VFR BLD AUTO: 32.3 % (ref 35–47)
HGB BLD-MCNC: 10.8 G/DL (ref 11.7–15.7)
IMM GRANULOCYTES # BLD: 0.1 10E9/L (ref 0–0.4)
IMM GRANULOCYTES NFR BLD: 1.3 %
LYMPHOCYTES # BLD AUTO: 3 10E9/L (ref 0.8–5.3)
LYMPHOCYTES NFR BLD AUTO: 34.4 %
MCH RBC QN AUTO: 30.9 PG (ref 26.5–33)
MCHC RBC AUTO-ENTMCNC: 33.4 G/DL (ref 31.5–36.5)
MCV RBC AUTO: 93 FL (ref 78–100)
MONOCYTES # BLD AUTO: 0.7 10E9/L (ref 0–1.3)
MONOCYTES NFR BLD AUTO: 8.4 %
NEUTROPHILS # BLD AUTO: 4.7 10E9/L (ref 1.6–8.3)
NEUTROPHILS NFR BLD AUTO: 54.7 %
NRBC # BLD AUTO: 0 10*3/UL
NRBC BLD AUTO-RTO: 0 /100
PLATELET # BLD AUTO: 225 10E9/L (ref 150–450)
POTASSIUM SERPL-SCNC: 3.3 MMOL/L (ref 3.4–5.3)
RBC # BLD AUTO: 3.49 10E12/L (ref 3.8–5.2)
SODIUM SERPL-SCNC: 137 MMOL/L (ref 133–144)
WBC # BLD AUTO: 8.7 10E9/L (ref 4–11)

## 2017-12-07 PROCEDURE — 96374 THER/PROPH/DIAG INJ IV PUSH: CPT

## 2017-12-07 PROCEDURE — 25800025 ZZH RX 258: Performed by: EMERGENCY MEDICINE

## 2017-12-07 PROCEDURE — 80048 BASIC METABOLIC PNL TOTAL CA: CPT | Performed by: EMERGENCY MEDICINE

## 2017-12-07 PROCEDURE — 00000146 ZZHCL STATISTIC GLUCOSE BY METER IP

## 2017-12-07 PROCEDURE — 99285 EMERGENCY DEPT VISIT HI MDM: CPT | Mod: 25

## 2017-12-07 PROCEDURE — 25000128 H RX IP 250 OP 636: Performed by: EMERGENCY MEDICINE

## 2017-12-07 PROCEDURE — 85025 COMPLETE CBC W/AUTO DIFF WBC: CPT | Performed by: EMERGENCY MEDICINE

## 2017-12-07 PROCEDURE — 96375 TX/PRO/DX INJ NEW DRUG ADDON: CPT

## 2017-12-07 PROCEDURE — 74020 XR ABDOMEN 2 VW: CPT

## 2017-12-07 RX ORDER — DEXTROSE MONOHYDRATE 25 G/50ML
50 INJECTION, SOLUTION INTRAVENOUS ONCE
Status: COMPLETED | OUTPATIENT
Start: 2017-12-07 | End: 2017-12-07

## 2017-12-07 RX ORDER — ONDANSETRON 2 MG/ML
4 INJECTION INTRAMUSCULAR; INTRAVENOUS ONCE
Status: COMPLETED | OUTPATIENT
Start: 2017-12-07 | End: 2017-12-07

## 2017-12-07 RX ADMIN — DEXTROSE MONOHYDRATE 50 ML: 25 INJECTION, SOLUTION INTRAVENOUS at 20:29

## 2017-12-07 RX ADMIN — ONDANSETRON 4 MG: 2 INJECTION INTRAMUSCULAR; INTRAVENOUS at 20:29

## 2017-12-07 RX ADMIN — PROCHLORPERAZINE EDISYLATE 10 MG: 5 INJECTION INTRAMUSCULAR; INTRAVENOUS at 21:18

## 2017-12-07 ASSESSMENT — ENCOUNTER SYMPTOMS
NAUSEA: 1
VOMITING: 1
TREMORS: 1

## 2017-12-07 NOTE — IP AVS SNAPSHOT
MRN:[id number]                      After Visit Summary   12/7/2017    Nena Tang    MRN: [id number]           Thank you!     Thank you for choosing [facility] for your care. Our goal is always to provide you with excellent care. Hearing back from our patients is one way we can continue to improve our services. Please take a few minutes to complete the written survey that you may receive in the mail after you visit. If you would like to speak to someone directly about your visit please contact Patient Relations at [phone]. Thank you!          Patient Information     Date Of Birth          1960        Designated Caregiver       Most Recent Value    Caregiver    Will someone help with your care after discharge? yes    Name of designated caregiver Alva     Phone number of caregiver [phone]      About your hospital stay     You were admitted on:  December 8, 2017 You last received care in the:  [place] [address] [facility]    You were discharged on:  December 9, 2017       Who to Call     For medical emergencies, please call 911.  For non-urgent questions about your medical care, please call your primary care provider or clinic, [phone]          Attending Provider     Provider Specialty    De Miller DO Emergency Medicine    Karthikeyan Miles MD Internal Medicine       Primary Care Provider Office Phone # Fax #    ART Arias [phone]      After Care Instructions     Activity       Your activity upon discharge: activity as tolerated            Diet       Follow this diet upon discharge: diabetic                  Follow-up Appointments     Follow-up and recommended labs and tests        You are being discharged on a reduced dose of insulin.  As your diet improves I would expect your blood sugar to climb and you will likely need to increase your insulin dosing.  Monitor your blood sugars three times a day  at home.  If your blood sugars are climbing consistently above 200 recommend you contact your doctor to get instructions on how much to increase your insulin dose                  Your next 10 appointments already scheduled     Dec 29, 2017  1:30 PM CST   Return Visit with ART Arias CNP   [facility] ([facility])    [address]  [address]  [facility] [address]   [phone]            Dec 29, 2017  1:30 PM CST   Return Visit with BIN Mondragon   [facility] ([facility])    [address]  [address]  [facility] [address]   [phone]            Jan 31, 2018  8:15 AM CST   RETURN RETINA with Tiburcio Chacon MD   [facility] ([facility]    [address]  [address]  [address] [address]  [facility] [address]   [phone]              Further instructions from your care team       Please check blood sugar before meals and at bedtime. Contact your doctor if blood sugars are consistently over 200.     Pending Results     No orders found from 12/5/2017 to 12/8/2017.            Statement of Approval     Ordered          12/09/17 1155  I have reviewed and agree with all the recommendations and orders detailed in this document.  EFFECTIVE NOW     Approved and electronically signed by:  Indra Garcia MD             Admission Information     Date & Time Provider Department Dept. Phone    12/7/2017 Karthikeyan Miles MD [place] [address] [facility] [phone]      Your Vitals Were     Blood Pressure Pulse Temperature Respirations Height Weight    120/61 (BP Location: Right arm) 88 98.2  F (36.8  C) (Oral) 16 1.524 m (5') 102.1 kg (225 lb)    Last Period Pulse Oximetry BMI (Body Mass Index)             01/06/2015 94% 43.94 kg/m2         MyChart Information     [employer] gives you secure access to your  electronic health record. If you see a primary care provider, you can also send messages to your care team and make appointments. If you have questions, please call your primary care clinic.  If you do not have a primary care provider, please call [phone] and they will assist you.        Care EveryWhere ID     This is your Care EveryWhere ID. This could be used by other organizations to access your [place] medical records  [id number]        Equal Access to Services     RAMESH GARRIDO : Hadii samira lancaster Sodelphine, wadeejayda lutanikaadaha, qaybta kaalmada hossein, lorena martins. So [facility] [phone].    ATENCIÓN: Si habla natalie, tiene a trinh disposición servicios gratuitos de asistencia lingüística. Llame al [phone].    We comply with applicable federal civil rights laws and [place] laws. We do not discriminate on the basis of race, color, national origin, age, disability, sex, sexual orientation, or gender identity.               Review of your medicines      CONTINUE these medicines which may have CHANGED, or have new prescriptions. If we are uncertain of the size of tablets/capsules you have at home, strength may be listed as something that might have changed.        Dose / Directions    insulin aspart 100 UNIT/ML injection   Commonly known as:  NovoLOG FLEXPEN   This may have changed:  how much to take   Used for:  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, with long-term current use of insulin, unspecified laterality (H)        Dose:  5 Units   Inject 5 Units Subcutaneous 3 times daily (with meals) Once daily, can add additional 5 units if BGs are >500mg/dL.   Quantity:  15 mL   Refills:  11       insulin glargine 100 UNIT/ML injection   Commonly known as:  LANTUS   This may have changed:  how much to take   Used for:  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, with long-term current use  of insulin, unspecified laterality (H)        Dose:  30 Units   Inject 30 Units Subcutaneous At Bedtime   Quantity:  3 mL   Refills:  11         CONTINUE these medicines which have NOT CHANGED        Dose / Directions    ACETAMINOPHEN PO        Dose:  1000 mg   Take 1,000 mg by mouth every 6 hours as needed for pain or fever   Refills:  0       albuterol 108 (90 BASE) MCG/ACT Inhaler   Commonly known as:  PROAIR HFA/PROVENTIL HFA/VENTOLIN HFA   Used for:  Dyspnea on exertion        Dose:  2 puff   Inhale 2 puffs into the lungs every 6 hours as needed for shortness of breath / dyspnea or wheezing   Quantity:  3 Inhaler   Refills:  1       aspirin 81 MG EC tablet   Commonly known as:  ASPIRIN LOW DOSE   Used for:  Coronary artery disease involving native heart with angina pectoris, unspecified vessel or lesion type (H)        Dose:  81 mg   Take 1 tablet (81 mg) by mouth daily   Quantity:  100 tablet   Refills:  4       atorvastatin 80 MG tablet   Commonly known as:  LIPITOR   Used for:  Hyperlipidemia LDL goal <100        TAKE 1 TABLET (80MG) BY MOUTH DAILY   Quantity:  28 tablet   Refills:  11       blood glucose monitoring lancets   Used for:  Type 2 diabetes mellitus with diabetic neuropathy, with long-term current use of insulin (H)        Use to test blood sugar 2 times daily or as directed.  Ok to substitute alternative if insurance prefers.   Quantity:  150 each   Refills:  11       blood glucose monitoring meter device kit   Used for:  Type 2 diabetes mellitus with diabetic neuropathy, with long-term current use of insulin (H)        Use to test blood sugar 2 times daily or as directed.  Ok to substitute alternative if insurance prefers.   Quantity:  1 kit   Refills:  0       chlorthalidone 25 MG tablet   Commonly known as:  HYGROTON   Used for:  HTN, goal below 140/90        Dose:  25 mg   Take 1 tablet (25 mg) by mouth daily   Quantity:  30 tablet   Refills:  3       gabapentin 300 MG capsule   Commonly  known as:  NEURONTIN   Used for:  Type 2 diabetes mellitus with diabetic neuropathy, with long-term current use of insulin (H)        TAKE 2 CAPSULES (600MG) BY MOUTH THREE TIMES A DAY   Quantity:  168 capsule   Refills:  3       glucose 4 G Chew chewable tablet   Used for:  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, with long-term current use of insulin, unspecified laterality (H)        Take 2 every 15 minutes for blood sugar <70mg/dL. Recheck blood sugar every 15 minutes until above 70mg/dL, then eat a substantial meal.   Quantity:  20 tablet   Refills:  1       * haloperidol 5 MG tablet   Commonly known as:  HALDOL   Used for:  Schizoaffective disorder, depressive type (H)        Dose:  5 mg   Take 1 tablet (5 mg) by mouth 2 times daily   Quantity:  60 tablet   Refills:  0       * haloperidol 5 MG tablet   Commonly known as:  HALDOL   Used for:  Schizoaffective disorder, depressive type (H)        Dose:  5 mg   Take 1 tablet (5 mg) by mouth every 12 hours as needed for agitation   Quantity:  30 tablet   Refills:  0       ibuprofen 600 MG tablet   Commonly known as:  ADVIL/MOTRIN   Used for:  Closed fracture of one rib of right side, initial encounter        Dose:  600 mg   Take 1 tablet (600 mg) by mouth every 4 hours as needed for moderate pain   Quantity:  60 tablet   Refills:  0       losartan 100 MG tablet   Commonly known as:  COZAAR   Used for:  Coronary artery disease involving native heart with angina pectoris, unspecified vessel or lesion type (H)        TAKE 1 TABLET (100MG) BY MOUTH DAILY   Quantity:  28 tablet   Refills:  3       melatonin 5 MG Caps   Used for:  Insomnia, unspecified type        Dose:  1 capsule   Take 1 capsule by mouth daily At dinnertime   Quantity:  90 capsule   Refills:  1       metoprolol 100 MG 24 hr tablet   Commonly known as:  TOPROL-XL   Used for:  Coronary artery disease involving native heart with angina pectoris, unspecified  vessel or lesion type (H)        TAKE 1 TABLET (100MG) BY MOUTH DAILY   Quantity:  28 tablet   Refills:  11       * ONETOUCH ULTRA test strip   Used for:  Type 2 diabetes mellitus with diabetic neuropathy, with long-term current use of insulin (H)   Generic drug:  blood glucose monitoring        TEST TWICE DAILY AS DIRECTED   Quantity:  150 strip   Refills:  8       * blood glucose monitoring test strip   Commonly known as:  ONETOUCH VERIO IQ   Used for:  Type 2 diabetes mellitus with diabetic neuropathy, with long-term current use of insulin (H)        Use to test blood sugar 2 times daily or as directed.  Ok to substitute alternative if insurance prefers.   Quantity:  150 strip   Refills:  11       * order for DME   Used for:  Falls frequently        Equipment being ordered: Rolling walker   Quantity:  1 Device   Refills:  0       * order for DME   Used for:  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, with long-term current use of insulin, unspecified laterality (H)        Hold Novolog if meals are refused.   Quantity:  1 each   Refills:  0       * order for DME   Used for:  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, with long-term current use of insulin, unspecified laterality (H)        Diabetic Shoes   Quantity:  2 each   Refills:  0       polyethylene glycol powder   Commonly known as:  MIRALAX/GLYCOLAX   Used for:  Constipation, unspecified constipation type        TAKE 17 GRAMS (1 CAPFUL) BY MOUTH DAILY   Quantity:  527 g   Refills:  3       ranitidine 300 MG tablet   Commonly known as:  ZANTAC   Used for:  Gastroesophageal reflux disease without esophagitis        TAKE 1 TABLET (300MG) BY MOUTH AT BEDTIME   Quantity:  90 tablet   Refills:  1       senna-docusate 8.6-50 MG per tablet   Commonly known as:  SENOKOT-S;PERICOLACE        Dose:  1 tablet   Take 1 tablet by mouth 2 times daily as needed for constipation    Refills:  0       SERTRALINE HCL PO        Dose:  50 mg   Take 50 mg by mouth daily   Refills:  0       TRULICITY 1.5 MG/0.5ML pen   Used for:  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, with long-term current use of insulin, unspecified laterality (H)   Generic drug:  dulaglutide        INJECT 1.5MG SUBCUTANEOUSLY EVERY SEVEN DAYS   Quantity:  2 mL   Refills:  11       ULTICARE MINI 31G X 6 MM   Used for:  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, with long-term current use of insulin, unspecified laterality (H)   Generic drug:  insulin pen needle        USE 4 DAILY OR AS DIRECTED   Quantity:  100 each   Refills:  11       vitamin D3 95635 UNITS capsule   Commonly known as:  CHOLECALCIFEROL   Used for:  Vitamin D deficiency        TAKE 1 CAPSULE (50,000 UNITS) BY MOUTH ONCE A WEEK   Quantity:  4 capsule   Refills:  3       * Notice:  This list has 7 medication(s) that are the same as other medications prescribed for you. Read the directions carefully, and ask your doctor or other care provider to review them with you.         Where to get your medicines      Some of these will need a paper prescription and others can be bought over the counter. Ask your nurse if you have questions.     You don't need a prescription for these medications     insulin aspart 100 UNIT/ML injection    insulin glargine 100 UNIT/ML injection                Protect others around you: Learn how to safely use, store and throw away your medicines at www.disposemymeds.org.             Medication List: This is a list of all your medications and when to take them. Check marks below indicate your daily home schedule. Keep this list as a reference.      Medications           Morning Afternoon Evening Bedtime As Needed    ACETAMINOPHEN PO   Take 1,000 mg by mouth every 6 hours as needed for pain or fever   Next Dose Due:  Did not take during hospital stay.  May take as directed                                albuterol 108 (90 BASE) MCG/ACT Inhaler   Commonly known as:  PROAIR HFA/PROVENTIL HFA/VENTOLIN HFA   Inhale 2 puffs into the lungs every 6 hours as needed for shortness of breath / dyspnea or wheezing   Next Dose Due:  Did not take during hospital stay. May take as directed                                aspirin 81 MG EC tablet   Commonly known as:  ASPIRIN LOW DOSE   Take 1 tablet (81 mg) by mouth daily   Next Dose Due:  Did not take during hospital stay. May take as directed                                atorvastatin 80 MG tablet   Commonly known as:  LIPITOR   TAKE 1 TABLET (80MG) BY MOUTH DAILY   Next Dose Due:  Did not take during hospital stay. May take as directed                                blood glucose monitoring lancets   Use to test blood sugar 2 times daily or as directed.  Ok to substitute alternative if insurance prefers.                                blood glucose monitoring meter device kit   Use to test blood sugar 2 times daily or as directed.  Ok to substitute alternative if insurance prefers.                                chlorthalidone 25 MG tablet   Commonly known as:  HYGROTON   Take 1 tablet (25 mg) by mouth daily   Next Dose Due:  Did not take during hospital stay. May take as directed                                gabapentin 300 MG capsule   Commonly known as:  NEURONTIN   TAKE 2 CAPSULES (600MG) BY MOUTH THREE TIMES A DAY   Last time this was given:  600 mg on 12/9/2017  8:25 AM   Next Dose Due:  Take two more times today (12/9) at 4:00PM and 10:00PM                                         glucose 4 G Chew chewable tablet   Take 2 every 15 minutes for blood sugar <70mg/dL. Recheck blood sugar every 15 minutes until above 70mg/dL, then eat a substantial meal.                                * haloperidol 5 MG tablet   Commonly known as:  HALDOL   Take 1 tablet (5 mg) by mouth 2 times daily   Last time this was given:  5 mg on  12/9/2017  8:24 AM   Next Dose Due:  Take once more today (12/9)  in the evening.                                      * haloperidol 5 MG tablet   Commonly known as:  HALDOL   Take 1 tablet (5 mg) by mouth every 12 hours as needed for agitation   Last time this was given:  5 mg on 12/9/2017  8:24 AM   Next Dose Due:  Take as directed                                   ibuprofen 600 MG tablet   Commonly known as:  ADVIL/MOTRIN   Take 1 tablet (600 mg) by mouth every 4 hours as needed for moderate pain   Next Dose Due:  Did not take during hospital stay. May take as directed                                insulin aspart 100 UNIT/ML injection   Commonly known as:  NovoLOG FLEXPEN   Inject 5 Units Subcutaneous 3 times daily (with meals) Once daily, can add additional 5 units if BGs are >500mg/dL.   Last time this was given:  4 Units on 12/8/2017  2:31 AM   Next Dose Due:  Take with breakfast, lunch and dinner                                         insulin glargine 100 UNIT/ML injection   Commonly known as:  LANTUS   Inject 30 Units Subcutaneous At Bedtime   Next Dose Due:  Today 12/9 at bedtime                                   losartan 100 MG tablet   Commonly known as:  COZAAR   TAKE 1 TABLET (100MG) BY MOUTH DAILY   Last time this was given:  100 mg on 12/9/2017  8:24 AM   Next Dose Due:  Tomorrow 12/10 in the morning                                   melatonin 5 MG Caps   Take 1 capsule by mouth daily At dinnertime   Last time this was given:  Yesterday 12/9 at 10:23PM   Next Dose Due:  Today 12/9 in the evening with dinner                                   metoprolol 100 MG 24 hr tablet   Commonly known as:  TOPROL-XL   TAKE 1 TABLET (100MG) BY MOUTH DAILY   Last time this was given:  100 mg on 12/9/2017  8:24 AM   Next Dose Due:  Tomorrow 12/10 in the morning                                   * ONETOUCH ULTRA test strip   TEST TWICE DAILY AS DIRECTED   Generic drug:  blood glucose monitoring                                 * blood glucose monitoring test strip   Commonly known as:  ONETOUCH VERIO IQ   Use to test blood sugar 2 times daily or as directed.  Ok to substitute alternative if insurance prefers.                                * order for DME   Equipment being ordered: Rolling walker                                * order for DME   Hold Novolog if meals are refused.                                * order for DME   Diabetic Shoes                                polyethylene glycol powder   Commonly known as:  MIRALAX/GLYCOLAX   TAKE 17 GRAMS (1 CAPFUL) BY MOUTH DAILY   Last time this was given:  Today 12/9 at 8:24AM   Next Dose Due:  Tomorrow 12/10 in the morning                                   ranitidine 300 MG tablet   Commonly known as:  ZANTAC   TAKE 1 TABLET (300MG) BY MOUTH AT BEDTIME   Last time this was given:  300 mg on 12/8/2017 10:23 PM   Next Dose Due:  Today 12/9 at bedtime                                   senna-docusate 8.6-50 MG per tablet   Commonly known as:  SENOKOT-S;PERICOLACE   Take 1 tablet by mouth 2 times daily as needed for constipation   Next Dose Due:  Did not take during hospital stay. May take as directed                                SERTRALINE HCL PO   Take 50 mg by mouth daily   Last time this was given:  50 mg on 12/9/2017  8:25 AM   Next Dose Due:  Tomorrow 12/10 in the morning                                   TRULICITY 1.5 MG/0.5ML pen   INJECT 1.5MG SUBCUTANEOUSLY EVERY SEVEN DAYS   Generic drug:  dulaglutide   Next Dose Due:  Did not take during hospital stay. May take as directed                                ULTICARE MINI 31G X 6 MM   USE 4 DAILY OR AS DIRECTED   Generic drug:  insulin pen needle   Next Dose Due:  Did not take during hospital stay. May take as directed                                vitamin D3 07302 UNITS capsule   Commonly known as:  CHOLECALCIFEROL   TAKE 1 CAPSULE (50,000 UNITS) BY MOUTH ONCE A WEEK   Next Dose Due:  Did not take during hospital stay.  May take as directed                                * Notice:  This list has 7 medication(s) that are the same as other medications prescribed for you. Read the directions carefully, and ask your doctor or other care provider to review them with you.

## 2017-12-07 NOTE — IP AVS SNAPSHOT
[place] [address] [facility]    [address]    [facility] [address]    Phone:  [phone]    Fax:  [phone]                                       After Visit Summary   12/7/2017    Nena Tang    MRN: [id number]           After Visit Summary Signature Page     I have received my discharge instructions, and my questions have been answered. I have discussed any challenges I see with this plan with the nurse or doctor.    ..........................................................................................................................................  Patient/Patient Representative Signature      ..........................................................................................................................................  Patient Representative Print Name and Relationship to Patient    ..................................................               ................................................  Date                                            Time    ..........................................................................................................................................  Reviewed by Signature/Title    ...................................................              ..............................................  Date                                                            Time

## 2017-12-07 NOTE — LETTER
Transition Communication Hand-off for Care Transitions to Next Level of Care Provider    Hand-off for Care Transitions to Next Level of Care Provider  Name: Nena Tang  : 1960  MRN #: [id number]  Reason for Hospitalization:  Hypoglycemia [E16.2]  Intractable vomiting with nausea, unspecified vomiting type [R11.2]  Admit Date/Time: 2017  8:10 PM  Discharge Date:  2017    Reason for Communication Hand-off Referral: Other Nausea & Vomiting    Discharge Plan:  Discharged to: Home with support                   Patient agreeable to post-hospital support suggestions:  Yes  Discharge Plan:  Return to [facility].            Patient is on new medications:   No           MTM follow up recommended: No           Tel-Assurance program:  Ineligible  Follow-up specialty is recommended: No.   Follow-up plan:  Future Appointments  Date Time Provider Department Center   2017 1:30 [facility]   2017 1:30 PM Rowena Haas, APRN [facility]   2018 8:15 AM Tiburcio Chacon MD [facility]     Any outstanding tests or procedures:  No.       Key Recommendations:  a1c 8.9 as of 17 and trending upward.  Pt said recently she has had high readings.  Checks her bg 3x/day and is compliant with her medications.  From [facility]    Communicated handoff via EPIC Comm Mgt to Rowena Haas NP's CC at [phone].      Andra Vogt, RN, BSN, CTS  [facility]  [phone]    AVS/Discharge Summary is the source of truth; this is a helpful guide for improved communication of patient story

## 2017-12-07 NOTE — LETTER
Key Recommendations:  a1c 8.9 as of 11/7/17 and trending upward.  Pt said recently she has had high readings.  Checks her bg 3x/day and is compliant with her medications.  From Miriam Flowers    AVS/Discharge Summary is the source of truth; this is a helpful guide for improved communication of patient story

## 2017-12-08 PROBLEM — K52.9 GASTROENTERITIS: Status: ACTIVE | Noted: 2017-12-08

## 2017-12-08 LAB
GLUCOSE BLDC GLUCOMTR-MCNC: 121 MG/DL (ref 70–99)
GLUCOSE BLDC GLUCOMTR-MCNC: 146 MG/DL (ref 70–99)
GLUCOSE BLDC GLUCOMTR-MCNC: 149 MG/DL (ref 70–99)
GLUCOSE BLDC GLUCOMTR-MCNC: 152 MG/DL (ref 70–99)
GLUCOSE BLDC GLUCOMTR-MCNC: 189 MG/DL (ref 70–99)
GLUCOSE BLDC GLUCOMTR-MCNC: 194 MG/DL (ref 70–99)
GLUCOSE BLDC GLUCOMTR-MCNC: 194 MG/DL (ref 70–99)
GLUCOSE BLDC GLUCOMTR-MCNC: 227 MG/DL (ref 70–99)

## 2017-12-08 PROCEDURE — 96372 THER/PROPH/DIAG INJ SC/IM: CPT

## 2017-12-08 PROCEDURE — 00000146 ZZHCL STATISTIC GLUCOSE BY METER IP

## 2017-12-08 PROCEDURE — 25000125 ZZHC RX 250: Performed by: INTERNAL MEDICINE

## 2017-12-08 PROCEDURE — A9270 NON-COVERED ITEM OR SERVICE: HCPCS | Mod: GY | Performed by: INTERNAL MEDICINE

## 2017-12-08 PROCEDURE — 25000128 H RX IP 250 OP 636: Performed by: INTERNAL MEDICINE

## 2017-12-08 PROCEDURE — 99220 ZZC INITIAL OBSERVATION CARE,LEVL III: CPT | Performed by: INTERNAL MEDICINE

## 2017-12-08 PROCEDURE — 25000131 ZZH RX MED GY IP 250 OP 636 PS 637: Mod: GY | Performed by: INTERNAL MEDICINE

## 2017-12-08 PROCEDURE — 96375 TX/PRO/DX INJ NEW DRUG ADDON: CPT

## 2017-12-08 PROCEDURE — G0378 HOSPITAL OBSERVATION PER HR: HCPCS

## 2017-12-08 PROCEDURE — 25000132 ZZH RX MED GY IP 250 OP 250 PS 637: Mod: GY | Performed by: INTERNAL MEDICINE

## 2017-12-08 PROCEDURE — 25000128 H RX IP 250 OP 636: Performed by: EMERGENCY MEDICINE

## 2017-12-08 PROCEDURE — 96376 TX/PRO/DX INJ SAME DRUG ADON: CPT

## 2017-12-08 RX ORDER — POLYETHYLENE GLYCOL 3350 17 G/17G
17 POWDER, FOR SOLUTION ORAL DAILY
Status: DISCONTINUED | OUTPATIENT
Start: 2017-12-08 | End: 2017-12-09 | Stop reason: HOSPADM

## 2017-12-08 RX ORDER — GABAPENTIN 300 MG/1
600 CAPSULE ORAL 3 TIMES DAILY
Status: DISCONTINUED | OUTPATIENT
Start: 2017-12-08 | End: 2017-12-09 | Stop reason: HOSPADM

## 2017-12-08 RX ORDER — HALOPERIDOL 5 MG/1
5 TABLET ORAL EVERY 12 HOURS PRN
Status: DISCONTINUED | OUTPATIENT
Start: 2017-12-08 | End: 2017-12-09 | Stop reason: HOSPADM

## 2017-12-08 RX ORDER — ACETAMINOPHEN 325 MG/1
650 TABLET ORAL EVERY 4 HOURS PRN
Status: DISCONTINUED | OUTPATIENT
Start: 2017-12-08 | End: 2017-12-09 | Stop reason: HOSPADM

## 2017-12-08 RX ORDER — ONDANSETRON 2 MG/ML
4 INJECTION INTRAMUSCULAR; INTRAVENOUS EVERY 6 HOURS PRN
Status: DISCONTINUED | OUTPATIENT
Start: 2017-12-08 | End: 2017-12-09 | Stop reason: HOSPADM

## 2017-12-08 RX ORDER — ALBUTEROL SULFATE 90 UG/1
2 AEROSOL, METERED RESPIRATORY (INHALATION) EVERY 6 HOURS PRN
Status: DISCONTINUED | OUTPATIENT
Start: 2017-12-08 | End: 2017-12-09 | Stop reason: HOSPADM

## 2017-12-08 RX ORDER — ONDANSETRON 4 MG/1
4 TABLET, ORALLY DISINTEGRATING ORAL EVERY 6 HOURS PRN
Status: DISCONTINUED | OUTPATIENT
Start: 2017-12-08 | End: 2017-12-09 | Stop reason: HOSPADM

## 2017-12-08 RX ORDER — DEXTROSE MONOHYDRATE 25 G/50ML
25-50 INJECTION, SOLUTION INTRAVENOUS
Status: DISCONTINUED | OUTPATIENT
Start: 2017-12-08 | End: 2017-12-09 | Stop reason: HOSPADM

## 2017-12-08 RX ORDER — METOCLOPRAMIDE HYDROCHLORIDE 5 MG/ML
10 INJECTION INTRAMUSCULAR; INTRAVENOUS ONCE
Status: COMPLETED | OUTPATIENT
Start: 2017-12-08 | End: 2017-12-08

## 2017-12-08 RX ORDER — PROCHLORPERAZINE MALEATE 5 MG
10 TABLET ORAL EVERY 6 HOURS PRN
Status: DISCONTINUED | OUTPATIENT
Start: 2017-12-08 | End: 2017-12-09 | Stop reason: HOSPADM

## 2017-12-08 RX ORDER — SODIUM CHLORIDE AND POTASSIUM CHLORIDE 150; 900 MG/100ML; MG/100ML
INJECTION, SOLUTION INTRAVENOUS CONTINUOUS
Status: DISPENSED | OUTPATIENT
Start: 2017-12-08 | End: 2017-12-08

## 2017-12-08 RX ORDER — PROCHLORPERAZINE 25 MG
25 SUPPOSITORY, RECTAL RECTAL EVERY 12 HOURS PRN
Status: DISCONTINUED | OUTPATIENT
Start: 2017-12-08 | End: 2017-12-09 | Stop reason: HOSPADM

## 2017-12-08 RX ORDER — NALOXONE HYDROCHLORIDE 0.4 MG/ML
.1-.4 INJECTION, SOLUTION INTRAMUSCULAR; INTRAVENOUS; SUBCUTANEOUS
Status: DISCONTINUED | OUTPATIENT
Start: 2017-12-08 | End: 2017-12-09 | Stop reason: HOSPADM

## 2017-12-08 RX ORDER — NICOTINE POLACRILEX 4 MG
15-30 LOZENGE BUCCAL
Status: DISCONTINUED | OUTPATIENT
Start: 2017-12-08 | End: 2017-12-09 | Stop reason: HOSPADM

## 2017-12-08 RX ORDER — LIDOCAINE 40 MG/G
CREAM TOPICAL
Status: DISCONTINUED | OUTPATIENT
Start: 2017-12-08 | End: 2017-12-09 | Stop reason: HOSPADM

## 2017-12-08 RX ORDER — HALOPERIDOL 5 MG/1
5 TABLET ORAL 2 TIMES DAILY
Status: DISCONTINUED | OUTPATIENT
Start: 2017-12-08 | End: 2017-12-09 | Stop reason: HOSPADM

## 2017-12-08 RX ORDER — AMOXICILLIN 250 MG
1 CAPSULE ORAL 2 TIMES DAILY PRN
Status: DISCONTINUED | OUTPATIENT
Start: 2017-12-08 | End: 2017-12-09 | Stop reason: HOSPADM

## 2017-12-08 RX ORDER — METOPROLOL SUCCINATE 100 MG/1
100 TABLET, EXTENDED RELEASE ORAL DAILY
Status: DISCONTINUED | OUTPATIENT
Start: 2017-12-08 | End: 2017-12-09 | Stop reason: HOSPADM

## 2017-12-08 RX ORDER — LOSARTAN POTASSIUM 100 MG/1
100 TABLET ORAL DAILY
Status: DISCONTINUED | OUTPATIENT
Start: 2017-12-08 | End: 2017-12-09 | Stop reason: HOSPADM

## 2017-12-08 RX ADMIN — PROCHLORPERAZINE EDISYLATE 10 MG: 5 INJECTION INTRAMUSCULAR; INTRAVENOUS at 18:09

## 2017-12-08 RX ADMIN — GABAPENTIN 600 MG: 300 CAPSULE ORAL at 22:23

## 2017-12-08 RX ADMIN — GABAPENTIN 600 MG: 300 CAPSULE ORAL at 17:07

## 2017-12-08 RX ADMIN — LOSARTAN POTASSIUM 100 MG: 100 TABLET ORAL at 12:16

## 2017-12-08 RX ADMIN — HALOPERIDOL 5 MG: 5 TABLET ORAL at 22:23

## 2017-12-08 RX ADMIN — INSULIN ASPART 4 UNITS: 100 INJECTION, SOLUTION INTRAVENOUS; SUBCUTANEOUS at 02:31

## 2017-12-08 RX ADMIN — SERTRALINE HYDROCHLORIDE 50 MG: 50 TABLET ORAL at 12:16

## 2017-12-08 RX ADMIN — POTASSIUM CHLORIDE AND SODIUM CHLORIDE: 900; 150 INJECTION, SOLUTION INTRAVENOUS at 02:25

## 2017-12-08 RX ADMIN — POTASSIUM CHLORIDE AND SODIUM CHLORIDE: 900; 150 INJECTION, SOLUTION INTRAVENOUS at 13:25

## 2017-12-08 RX ADMIN — METOPROLOL SUCCINATE 100 MG: 100 TABLET, EXTENDED RELEASE ORAL at 12:16

## 2017-12-08 RX ADMIN — RANITIDINE HYDROCHLORIDE 300 MG: 150 TABLET, FILM COATED ORAL at 22:23

## 2017-12-08 RX ADMIN — INSULIN GLARGINE 30 UNITS: 100 INJECTION, SOLUTION SUBCUTANEOUS at 22:23

## 2017-12-08 RX ADMIN — METOCLOPRAMIDE 10 MG: 5 INJECTION, SOLUTION INTRAMUSCULAR; INTRAVENOUS at 00:51

## 2017-12-08 RX ADMIN — POLYETHYLENE GLYCOL 3350 17 G: 17 POWDER, FOR SOLUTION ORAL at 12:16

## 2017-12-08 RX ADMIN — PROCHLORPERAZINE EDISYLATE 10 MG: 5 INJECTION INTRAMUSCULAR; INTRAVENOUS at 09:15

## 2017-12-08 RX ADMIN — ONDANSETRON 4 MG: 2 INJECTION INTRAMUSCULAR; INTRAVENOUS at 17:38

## 2017-12-08 RX ADMIN — HALOPERIDOL 5 MG: 5 TABLET ORAL at 08:26

## 2017-12-08 RX ADMIN — GABAPENTIN 600 MG: 300 CAPSULE ORAL at 12:16

## 2017-12-08 RX ADMIN — Medication 5 MG: at 22:23

## 2017-12-08 RX ADMIN — ONDANSETRON 4 MG: 4 TABLET, ORALLY DISINTEGRATING ORAL at 08:32

## 2017-12-08 NOTE — PLAN OF CARE
Problem: Patient Care Overview  Goal: Plan of Care/Patient Progress Review  Outcome: No Change  2PRIMARY DIAGNOSIS: GASTROENTERITIS    OUTPATIENT/OBSERVATION GOALS TO BE MET BEFORE DISCHARGE  1. Orthostatic performed: No    2. Tolerating PO fluid and/or antibiotics (if applicable): No    3. Nausea/Vomiting/Diarrhea symptoms improved: No,  nausea and emesis    4. Pain status: Pain free.    5. Return to near baseline physical activity: Yes    Discharge Planner Nurse   Safe discharge environment identified: Yes  Barriers to discharge: Yes       Entered by: Christin Baron 12/08/2017 3:49 PM     Please review provider order for any additional goals.   Nurse to notify provider when observation goals have been met and patient is ready for discharge.

## 2017-12-08 NOTE — ED NOTES
Pt vomiting, writer instructed pt to not attempt to eat or drink anything more at this time. Pt verbalized understanding

## 2017-12-08 NOTE — PHARMACY-ADMISSION MEDICATION HISTORY
Admission medication history interview status for this patient is complete. See [facility] admission navigator for allergy information, prior to admission medications and immunization status.     Medication history interview source(s):Caregiver   Medication history resources (including written lists, pill bottles, clinic record): Epic list, (same as Med list from group [facility]).  Primary pharmacy:     Changes made to PTA medication list:  Added: none  Deleted: none  Changed: none    Actions taken by pharmacist (provider contacted, etc): Called Group home([facility])     Additional medication history information:None    Medication reconciliation/reorder completed by provider prior to medication history? Yes some meds addressed    Do you take OTC medications (eg tylenol, ibuprofen, fish oil, eye/ear drops, etc)? Y(Y/N)    For patients on insulin therapy: Y (Y/N)  Lantus:   (Y/N) (see Med list for doses)   Sliding scale Novolog --once daily can add 5 units to 22 unit mealtime dose for BG > 500  If Yes, do you have a baseline novolog pre-meal dose:  22 units with meals  Patients eat three meals a day:       How many episodes of hypoglycemia do you have per week: _______  How many missed doses do you have per week: ______  How many times do you check your blood glucose per day: _______   Any Barriers to therapy - Be specific :  cost of medications, comfortable with giving injections (if applicable), comfortable and confident with current diabetes regimen: Y/N ______________      Prior to Admission medications    Medication Sig Last Dose Taking? Auth Provider   SERTRALINE HCL PO Take 50 mg by mouth daily 12/7/2017 at Unknown time Yes Reported, Patient   insulin glargine (LANTUS) 100 UNIT/ML injection Inject 57 Units Subcutaneous At Bedtime 12/6/2017 at HS Yes Rowena Haas, APRN CNP   insulin aspart (NOVOLOG FLEXPEN) 100 UNIT/ML injection Inject 22 Units Subcutaneous 3 times daily (with meals) Once daily, can add  additional 5 units if BGs are >500mg/dL. 12/7/2017 at Unknown time Yes Rowena Haas APRN CNP   ranitidine (ZANTAC) 300 MG tablet TAKE 1 TABLET (300MG) BY MOUTH AT BEDTIME 12/6/2017 at Unknown time Yes Rowena Haas APRN CNP   haloperidol (HALDOL) 5 MG tablet Take 1 tablet (5 mg) by mouth 2 times daily 12/7/2017 at Unknown time Yes Wade Siddiqi MD   haloperidol (HALDOL) 5 MG tablet Take 1 tablet (5 mg) by mouth every 12 hours as needed for agitation  Yes Wade Siddiqi MD   losartan (COZAAR) 100 MG tablet TAKE 1 TABLET (100MG) BY MOUTH DAILY 12/7/2017 at Unknown time Yes Rowena Haas APRN CNP   chlorthalidone (HYGROTON) 25 MG tablet Take 1 tablet (25 mg) by mouth daily 12/7/2017 at Unknown time Yes Rowena Haas APRN CNP   gabapentin (NEURONTIN) 300 MG capsule TAKE 2 CAPSULES (600MG) BY MOUTH THREE TIMES A DAY 12/7/2017 at Unknown time Yes Rowena Haas APRN CNP   vitamin D3 (CHOLECALCIFEROL) 24365 UNITS capsule TAKE 1 CAPSULE (50,000 UNITS) BY MOUTH ONCE A WEEK 12/5/2017 at Unknown time Yes Rowena Haas APRN CNP   albuterol (PROAIR HFA/PROVENTIL HFA/VENTOLIN HFA) 108 (90 BASE) MCG/ACT Inhaler Inhale 2 puffs into the lungs every 6 hours as needed for shortness of breath / dyspnea or wheezing  Yes Rowena Haas APRN CNP   senna-docusate (SENOKOT-S;PERICOLACE) 8.6-50 MG per tablet Take 1 tablet by mouth 2 times daily as needed for constipation  Yes Unknown, Entered By History   ACETAMINOPHEN PO Take 1,000 mg by mouth every 6 hours as needed for pain or fever  Yes Unknown, Entered By History   atorvastatin (LIPITOR) 80 MG tablet TAKE 1 TABLET (80MG) BY MOUTH DAILY 12/7/2017 at Unknown time Yes Moraima Da Silva MD   metoprolol (TOPROL-XL) 100 MG 24 hr tablet TAKE 1 TABLET (100MG) BY MOUTH DAILY 12/7/2017 at Unknown time Yes Moraima Da Silva MD   polyethylene glycol (MIRALAX/GLYCOLAX) powder TAKE 17 GRAMS (1 CAPFUL) BY MOUTH DAILY 12/7/2017 at Unknown time Yes Rowena Haas,  APRN CNP   TRULICITY 1.5 MG/0.5ML pen INJECT 1.5MG SUBCUTANEOUSLY EVERY SEVEN DAYS 12/1/2017 at Unknown time Yes Usman Hodgson PA-C   aspirin (ASPIRIN LOW DOSE) 81 MG EC tablet Take 1 tablet (81 mg) by mouth daily 12/7/2017 at Unknown time Yes Usman Hodgson PA-C   ibuprofen (ADVIL,MOTRIN) 600 MG tablet Take 1 tablet (600 mg) by mouth every 4 hours as needed for moderate pain  Yes Jud Real PA-C   melatonin 5 MG CAPS Take 1 capsule by mouth daily At dinnertime 12/7/2017 at Dinnertime Yes Jud Real PA-C   glucose 4 G CHEW Take 2 every 15 minutes for blood sugar <70mg/dL. Recheck blood sugar every 15 minutes until above 70mg/dL, then eat a substantial meal.  Yes Jud Real PA-C   order for DME Diabetic Shoes   Rowena Haas APRN CNP   blood glucose monitoring (ONETOUCH VERIO IQ SYSTEM) meter device kit Use to test blood sugar 2 times daily or as directed.  Ok to substitute alternative if insurance prefers.   Rowena Haas APRN CNP   blood glucose monitoring (ONE TOUCH VERIO IQ) test strip Use to test blood sugar 2 times daily or as directed.  Ok to substitute alternative if insurance prefers.   Rowena Haas APRN CNP   blood glucose monitoring (ONE TOUCH DELICA) lancets Use to test blood sugar 2 times daily or as directed.  Ok to substitute alternative if insurance prefers.   Rowena Haas APRN CNP   ONE TOUCH ULTRA test strip TEST TWICE DAILY AS DIRECTED   Usman Hodgson PA-C   ULTIC[employer] MINI 31G X 6 MM insulin pen needle USE 4 DAILY OR AS DIRECTED   Moraima Da Silva MD   order for DME Hold Novolog if meals are refused.   Jud Real PA-C   order for DME Equipment being ordered: Jud Santana PA-C

## 2017-12-08 NOTE — PROGRESS NOTES
Pt seen and examiend.  Agree with admit H&P per Dr. Miles from earlier this AM    Admit with n/v.  Emesis resolved; still having nausea.  ? Viral gastroenteritis.  ADAT.  Continue IVF for today; stop this evening.  Will resume Lantus at 1/2 normal dose tonight.      Possible d/c tomorrow assuming sx improved and able to tolerate a diet.

## 2017-12-08 NOTE — PLAN OF CARE
Problem: Patient Care Overview  Goal: Plan of Care/Patient Progress Review  Outcome: No Change  Problem: Patient Care Overview  Goal: Plan of Care/Patient Progress Review  Outcome: No Change  PRIMARY DIAGNOSIS: GASTROENTERITIS     OUTPATIENT/OBSERVATION GOALS TO BE MET BEFORE DISCHARGE  1. Orthostatic performed: No     2. Tolerating PO fluid and/or antibiotics (if applicable): No     3. Nausea/Vomiting/Diarrhea symptoms improved: Yes - nausea, pt currently sleeping     4. Pain status: Pain free.     5. Return to near baseline physical activity: No     Discharge Planner Nurse   Safe discharge environment identified: Yes  Barriers to discharge: No       Entered by: Zahira Mckeon 12/08/2017 3:54 AM     Please review provider order for any additional goals.   Nurse to notify provider when observation goals have been met and patient is ready for discharge.

## 2017-12-08 NOTE — TELEPHONE ENCOUNTER
Nena's blood glucose usually runs around 250. She's eaten less yesterday and today in an effort to lower her blood glucose. She was 132 and nauseated, shaky and had a headache. She ate two glucose tabs and her recheck was 133. She ate a second and her blood glucose was 130. Her other symptoms are persisting. She ate two more. If she continues to trend downward, Alva her current caregiver at [facility], will call 911. Other vitals are normal.  This call was an FYI for Nena's chart. Alva was not asking to have Nena triaged.  Erum PULIDO RN [facility]

## 2017-12-08 NOTE — ED PROVIDER NOTES
History     Chief Complaint:  Nausea & Vomiting    The history is provided by the patient.      Nena Tang is a 57 year old female with type 2 diabetes who presents to the emergency department via EMS for evaluation of nausea and vomiting. The patient reports nausea, shaking, and vomiting all day today, and states that she vomited about 8 times. She has tried eating several times but cannot keep anything down. Her blood sugar ranges between 250 to 350 on a regular basis, but was measured by EMS at 132. They gave her Zofran and 4 glucose tablets en route to the emergency department, but her blood sugar afterward was only measured at 109. Here in the emergency department her blood sugar is measured at 74. The patient reports taking her insulin today as normal, but did not take the Lantus. She currently reports improvement of her nausea and denies any pain. The patient lives at [facility].    Allergies:  Imidazole Antifungals  Ketoprofen  Lisinopril  Metformin  Metronidazole  Posaconazole     Medications:    Albuterol inhaler  Atorvastatin  Blood glucose monitoring kit  Chlorthalidone  Gabapentin   Glucose tablets  Haloperidol   Insulin injection  Losartan  Melatonin  Metoprolol  Polyethylene glycol powder  Ranitidine   Senna-docusate  Sertraline   Trulicity  Ulticare     Past Medical History:    CAD (coronary artery disease)   Chronic low back pain   Cocaine abuse, in remission   Fecal urgency   History of heroin abuse   Hyperlipidemia LDL goal <100   Hypertension   Illiterate   Irritable bowel syndrome   Left cataract   Migraine   Migraine headache   Moderate major depression   Noncompliance with medication regimen   Obesity   CINDY (obstructive sleep apnea)   Osteopenia   Schizoaffective disorder, depressive type   Suicidal intent   Takotsubo cardiomyopathy   Type 2 diabetes mellitus   Uterine cancer   Verbal auditory hallucination     Past Surgical History:    Oophorectomy   Cataract IOL,  rt/lt  Colonoscopy  Coronary CTA  Hysterectomy   Laparoscopic cholecystectomy  Phacoemulsification clear cornea with std intraocular lens implant x 2  Release trigger finger x 2    Family History:    Bladder Cancer Mother   COPD   Mother   Cirrhosis of liver Mother   Alcohol/Drug  Mother   DIABETES  Mother   Hypertension  Mother   Lipids   Mother   C.A.D.   Mother   Glaucoma  Mother   Alcohol/Drug  Sister   MENTAL ILLNESS Sister   Alcohol/Drug  Sister   Psychotic Disorder Sister   CANCER  Maternal Grandmother   Colon Cancer  Brother   Cancer - colorectal Brother   Alcohol/Drug  Brother     Social History:  Smoking Status: Never Smoker  Smokeless Tobacco: Never Used  Alcohol Use: No  Marital Status:      Review of Systems   Gastrointestinal: Positive for nausea and vomiting.   Neurological: Positive for tremors.   All other systems reviewed and are negative.    Physical Exam     Patient Vitals for the past 24 hrs:   BP Temp Pulse Heart Rate Resp SpO2   12/07/17 2344 - - - - - 96 %   12/07/17 2343 132/84 - - - - -   12/07/17 2227 - - - - - 98 %   12/07/17 2226 - - - - - 100 %   12/07/17 2225 138/71 - - - - -   12/07/17 2132 129/72 - - - - 96 %   12/07/17 2045 126/79 - - - - -   12/07/17 2030 128/83 - - - - -   12/07/17 2015 - - - - - 97 %   12/07/17 2014 (!) 167/93 98.2  F (36.8  C) 93 93 18 99 %     Physical Exam  Constitutional: Patient appears well-developed and well-nourished. Patient is in no acute distress  HENT:    Head: No external signs of trauma noted.   Eyes: Conjunctivae are normal. Pupils are equal, round, and reactive to light.   Cardiovascular:    Normal rate, regular rhythm and normal heart sounds.     Exam reveals no friction rub.     No murmur heard.  Pulmonary/Chest:    Effort normal and breath sounds normal.    No respiratory distress.    There are no wheezes.    There are no rales.   Abdominal:    Soft.    Bowel sounds normal.    There is no distension.    There is no tenderness.    There  is no rebound or guarding.   Musculoskeletal:    Normal range of motion.    Normal Tone  Neurological: Patient is alert and oriented to person, place, and time.   Skin: Skin is warm and dry. Patient is not diaphoretic.    Emergency Department Course     Imaging:  Radiology findings were communicated with the patient who voiced understanding of the findings.    Abdomen XR, 2 vw, flat and upright  No evidence for any bowel obstruction.  Reading per radiology.    Laboratory:  Laboratory findings were communicated with the patient who voiced understanding of the findings.    Glucose by meter (Collected 0002): 149 (H)  Glucose by meter (Collected 2304) 126 (H)  Glucose by meter (Collected 2201): 145 (H)  Glucose by meter (Collected 2100): 200 (H)  Glucose by meter (Collected 2018): 74  CBC: WBC 8.7, HGB 10.8 (L),   BMP: Potassium 3.3 (L) o/w WNL (Creatinine 1.02)    Interventions:  2029 Dextrose 50% 50 mL IV  2029 Zofran 4 mg IV  2118 Compazine 10 mg IV  (Pending): Reglan 10 mg IV    Emergency Department Course:    Nursing notes and vitals reviewed.    I performed an exam of the patient as documented above.     IV was inserted and blood was drawn for laboratory testing, results above.     The patient was sent for an x-ray while in the emergency department, results above.    2332 I reevaluated and updated the patient. She continues to feel nauseous and vomited, unable to tolerate the oral intake.    0005 I reevaluated the patient. She continues to feel nauseous.    0015 I discussed the treatment plan with the patient. They expressed understanding of this plan and consented to admission. I discussed the patient with Dr. Carter, who will admit the patient to a monitored bed for further evaluation and treatment.    I personally reviewed the laboratory and imaging results with the patient and answered all related questions prior to admission.    Impression & Plan      Medical Decision Making:  Nena Tang is  a 57 year old female who presents for evaluation of nausea and vomiting. Please see the HPI for specifics. The patient has not taken her long acting insulin today but has been vomiting. As she has not been able to keep any fluid down her sugar, which normally runs in the 250 to 300's, did seem to drop. The staff at her facility tried to give her oral glucose but the patient has been vomiting. She received Zofran by EMS and on the first attempt of a PO challenge here, she vomited. The patient received a second Zofran dose here as well as IV glucose.She initially improved, but vomited again. After compazine, the patient felt somewhat better, then eventually began feeling worse like she was going to vomit again. She doesn't want to try any other food as she feels she will vomit. The patient's sugar has remained stable over her emergency department stay, but as she still feels nauseated, we will place her in observation for further antiemetic care.    Impressions:    ICD-10-CM    1. Hypoglycemia E16.2    2. Intractable vomiting with nausea, unspecified vomiting type R11.2      Disposition:   The patient was admitted into the care of Dr. Miles for further evaluation and management.    Scribe Disclosure:  I, Ml Newton, am serving as a scribe at 9:25 PM on 12/7/2017 to document services personally performed by De Miller DO based on my observations and the provider's statements to me.    [facility] EMERGENCY DEPARTMENT       De Miller DO  12/08/17 0051

## 2017-12-08 NOTE — ED NOTES
Pt still dry heaving, states she was able to get some of the soda down but would prefer some IV dextrose instead of continuing to try and drink the rest of the soda.

## 2017-12-08 NOTE — ED NOTES
[facility]  ED Nurse Handoff Report    Nena Tang is a 57 year old female   ED Chief complaint: Nausea & Vomiting  . ED Diagnosis:   Final diagnoses:   Hypoglycemia   Intractable vomiting with nausea, unspecified vomiting type     Allergies:   Allergies   Allergen Reactions     Imidazole Antifungals Hives     Tolerates diflucan     Ketoprofen Itching     Pruritis to topical     Lisinopril Hives     Metformin Other (See Comments)     Patient hospitalized for lactic acidosis - admitting provider suspectd caused by metformin     Metronidazole Hives     Posaconazole Hives     Tolerates diflucan       Code Status: Full Code  Activity level - Baseline/Home:  Independent. Activity Level - Current:   Stand with Assist. Lift room needed: No. Bariatric: No   Needed: No   Isolation: No. Infection: Not Applicable.     Vital Signs:   Vitals:    12/07/17 2226 12/07/17 2227 12/07/17 2343 12/07/17 2344   BP:   132/84    Pulse:       Resp:       Temp:       SpO2: 100% 98%  96%       Cardiac Rhythm:  ,      Pain level: 0-10 Pain Scale: 0  Patient confused: No. Patient Falls Risk: Yes.   Elimination Status: Has voided   Patient Report - Initial Complaint: Nausea and vomiting. Focused Assessment: Gastrointestinal - GI WDL: nausea and vomiting; GI symptoms GI Signs/Symptoms: nausea; vomiting Gastrointestinal Comment: Pt presents with n/v that has been ongoing today with BGT around 100 which is low for her, recently started a longer acting insulin because BG was consistenly in the 250s for the past 3 months.  Blood sugars have leveled off the past two hours averaging about 130. Pt continues to complain of nausea and dizziness, but has not vomited for about two hours. Compazine seems to be effective in relieving nausea for periods of time. Pt able to tolerate water, sprite, and saltine crackers in ED.    Tests Performed: Labs. Abnormal Results:   Labs Ordered and Resulted from Time of ED Arrival Up to the  Time of Departure from the ED   CBC WITH PLATELETS DIFFERENTIAL - Abnormal; Notable for the following:        Result Value    RBC Count 3.49 (*)     Hemoglobin 10.8 (*)     Hematocrit 32.3 (*)     All other components within normal limits   BASIC METABOLIC PANEL - Abnormal; Notable for the following:     Potassium 3.3 (*)     GFR Estimate 56 (*)     All other components within normal limits   GLUCOSE BY METER - Abnormal; Notable for the following:     Glucose 200 (*)     All other components within normal limits   GLUCOSE BY METER - Abnormal; Notable for the following:     Glucose 145 (*)     All other components within normal limits   GLUCOSE BY METER - Abnormal; Notable for the following:     Glucose 126 (*)     All other components within normal limits   GLUCOSE BY METER - Abnormal; Notable for the following:     Glucose 149 (*)     All other components within normal limits   GLUCOSE BY METER     Abdomen XR, 2 vw, flat and upright   Final Result   IMPRESSION:  No evidence for any bowel obstruction..      MARILYN GEIGER MD      .   Treatments provided: Monitoring, nausea control, PO challenge  Family Comments: No family at bedside, caretaker Alva teixeira phone number: [phone]  OBS brochure/video discussed/provided to patient:  Yes  ED Medications:   Medications   ondansetron (ZOFRAN) injection 4 mg (4 mg Intravenous Given 12/7/17 2029)   dextrose 50 % injection 50 mL (50 mLs Intravenous Given 12/7/17 2029)   prochlorperazine (COMPAZINE) injection 10 mg (10 mg Intravenous Given 12/7/17 2118)     Drips infusing:  No  For the majority of the shift, the patient's behavior Green. Interventions performed were NA.     Severe Sepsis OR Septic Shock Diagnosis Present: No      ED Nurse Name/Phone Number: Manohar Salguero,   12:22 AM  RECEIVING UNIT ED HANDOFF REVIEW    Above ED Nurse Handoff Report was reviewed: Yes  Reviewed by: Zahira Mckeon on December 8, 2017 at 1:02 AM

## 2017-12-08 NOTE — ED NOTES
"Pt able to keep down water and crackers at this time. Pt continues to c/o nausea but says she is \"feeling ok.\" Pointed her to the emesis bag and she said \"I don't think I'm going to need it.\" Told her we would continue to monitor her nausea and blood sugars, which would ultimately determine her disposition.   "

## 2017-12-08 NOTE — ED NOTES
Bed: ED18  Expected date: 12/7/17  Expected time: 8:01 PM  Means of arrival: Ambulance  Comments:  BSV 3

## 2017-12-08 NOTE — ED NOTES
Pt in with BS reading of 100 at group home which she states is low for her. Feels dizzy and having n/v. Denies pain. Recently started on longer acting insulin due to BS readings in the 250's the past 3 months. ABC's intact, alert and oriented X3.

## 2017-12-08 NOTE — CONSULTS
Care Transition Initial Assessment - RN    Reason For Consult: care coordination/care conference, discharge planning   Met with: Patient.    DATA   Active Problems:    Gastroenteritis       Cognitive Status: awake, alert and oriented.  [facility]: [facility]  Primary Care MD Name: NP Rowena  Contact information and PCP information verified: Yes    Lives With: other (see comments) (group home) Miriam Ryan. Contact Zheng [phone] or Alva [phone].  They can provide transportation at time of dc.  They can also take her back on weekends.  New medications can be filled here.  They prefer a packet at [facility] for dc paperwork.                        Insurance concerns: No Insurance issues identified    ASSESSMENT  Patient currently receives the following services:  See below        Identified issues/concerns regarding health management: Pt is admitted with n/v.  She does have a hx of DM.  She said she checks her bg 3 times a day.  a1c is 8.9 as of 11/7/17.  She said lately they have been running high.        PLAN  Patient given options and choices for discharge yes .  Patient/family is agreeable to the plan?  Yes:   Patient anticipates discharging to  .        Patient anticipates needs for home equipment: No  Discharge Planner   Discharge Plans in progress: yes  Barriers to discharge plan: none  Plan/Disposition: Home       Care  (CTS) will continue to follow as needed.    Current Treatment Providers are:     PCP:  ART Schofield CNP  [facility]  [address]  [place] [address]     Psychiatrist:  [facility]  [address] [place] [address] [phone]     Therapist:  Liliana Miller  [facility] [place]  [facility],  [place], [place]   [phone]    CADI worker and ARMS worker.      Susanna RN CTS [id number]

## 2017-12-08 NOTE — ED NOTES
"Pt reports feeling less nauseated quite quickly after administration of Reglan. Pt concerned about blood sugars saying \"I'm scared to go to sleep tonight.\" Told her that her concerns were valid and that ED and upstairs nurses would be monitoring her blood sugars closely.   "

## 2017-12-08 NOTE — ED NOTES
Pt states nausea has improved slightly after medication, hasn't been vomiting anymore.  after D50.

## 2017-12-08 NOTE — ED NOTES
"Pts caretaker at bedside and left phone number. Caretaker verbalized concern about \"unsteady blood sugars\" and left her phone number if pt is going to be discharged. Caretaker said adult foster care staff can come pick her up at anytime, just to call her.  Alva: [phone]  Miguel Angel: [phone]  "

## 2017-12-08 NOTE — TELEPHONE ENCOUNTER
Clinic Action Needed:No    Reason for Call: Alva, care giver at [facility] is calling back.  They had talked to triage nurse earlier regarding Nena's blood sugars.  See note by DAVIN Nix.  Nena is now vomiting uncontrollably and they are wondering what to do with her Lantus for tonight.  They had previously been advised to call 911 and they did not.  I again advised 911 as Nena remains shaky, she is actively vomiting.  Due to her blood sugars being labile and now she is vomiting, she runs the risk of dehydration and continued blood sugar control complications. Caller appears to understand directives and agrees with plan.     Routed to: Not routed.    Rebekah Tabares RN  [facility]

## 2017-12-08 NOTE — PLAN OF CARE
Problem: Patient Care Overview  Goal: Plan of Care/Patient Progress Review  PRIMARY DIAGNOSIS: GASTROENTERITIS    OUTPATIENT/OBSERVATION GOALS TO BE MET BEFORE DISCHARGE  1. Orthostatic performed: No    2. Tolerating PO fluid and/or antibiotics (if applicable): No    3. Nausea/Vomiting/Diarrhea symptoms improved: No,  nausea and emesis    4. Pain status: Pain free.    5. Return to near baseline physical activity: Yes    Discharge Planner Nurse   Safe discharge environment identified: Yes  Barriers to discharge: Yes       Entered by: Christin Baron 12/08/2017 3:46 PM     Please review provider order for any additional goals.   Nurse to notify provider when observation goals have been met and patient is ready for discharge.

## 2017-12-08 NOTE — PLAN OF CARE
Problem: Patient Care Overview  Goal: Plan of Care/Patient Progress Review  Outcome: No Change  PRIMARY DIAGNOSIS: GASTROENTERITIS    OUTPATIENT/OBSERVATION GOALS TO BE MET BEFORE DISCHARGE  1. Orthostatic performed: No    2. Tolerating PO fluid and/or antibiotics (if applicable): No    3. Nausea/Vomiting/Diarrhea symptoms improved: Yes - nausea, pt currently sleeping    4. Pain status: Pain free.    5. Return to near baseline physical activity: No    Discharge Planner Nurse   Safe discharge environment identified: Yes  Barriers to discharge: No       Entered by: Zahira Mckeon 12/08/2017 3:54 AM     Please review provider order for any additional goals.   Nurse to notify provider when observation goals have been met and patient is ready for discharge.

## 2017-12-08 NOTE — H&P
[facility]  Hospitalist Admission Note  Name: Nena Tang    MRN: [id number]  YOB: 1960    Age: 57 year old  Date of admission: 12/7/2017  Primary care provider: Rowena Haas    Chief Complaint:  Nausea and vomiting    Assessment and Plan:   1. Nausea and vomiting: acute, with onset and symptoms most consistent with viral gastroenteritis.  She has improved somewhat with fluids and anti-emetics int he ER but cannot yet tolerate oral intake.  In the setting of borderline low blood sugars we'll admit her under observation status and continue to treat her supportively with fluids and prn IV antiemetics overnight.  If she does well and can advance her diet tomorrow she may be able to discharge home though we might need to consider a reduced insulin regimen if she is not yet eating normally.  --IVF at 100 cc/hr  --prn zofran, compazine  --hold long-acting insulin overnight pending blood sugar trend (she is on large doses of lantus at home so these will need to be readdressed in the AM by my colleague once we see the trend and whether she is eating yet or not)  --Q4H glucose checks, more frequent if hypoglycemic.  --monitor for any other signs/symptoms of pathology but currently her abdominal exam is benign so biliary disease (note she had a cholecystectomy in 2008) or obstructive process seems less likely.  Expand workup if she develops evidence of obstruction.    2.   DM2 on insulin: blood sugars borderline low, and for her much lower than usual.    --hold long-acting insulin overnight pending blood sugar trend (she is on large doses of insulin at home (57 U QHS lantus, large mealtime doses of aspart) so these will need to be readdressed in the AM by my colleague once we see the trend and whether she is eating yet or not)  --treat w/ high scale Q4H insulin SSI    3.   Schizoaffective disorder: prn haldol.  Will need to try to resume her usual oral meds in the morning if she can  tolerate PO at that point.    4.   CINDY: she has her cpap here with her.    5.   CAD: no chest pain.  Was non-obstructive by cath in 2014.  Resume ASA, usual home meds as able.    6.  HTN: hold home oral chlorthalidone and losartan until she can eat.    DVT Prophylaxis: ambulate  Code Status: Full Code  Discharge Dispo: admit to obs status      History of Present Illness:  Nena Tang is a 57 year old female with PMH including schizoaffective disorder who resides in a group home, DM on insulin, obesity, CAD, substance abuse in remission, chronic low back pain, IBS who presents with nausea and vomiting which started earlier today.  She notes that she resides in [facility] and went to a center today which she frequently visits.  She became nauseated and vomited a number of times to the point that her abdomen became sore.  She devloped some dizziness and weakness after this as well. She denies dyspnea, chest pain, fevers, bleeding issues, dysuria or other changes.  She normally has blood sugars that frequently run in the 300s and group home staff reported concern as she had some blood sugars in the 70s-80s so she was given glucose tabs which she vomited up as well. She then was sent to the ER.      Here her initial blood sugar was 74.  She was mildly hypokalemic but otherwise workup was fairly unrevealing.  She was given anti-emetics and fluids but still could not tolerate PO intake so she was admitted for further care and blood sugar monitoring overnight.     Past Medical History:  Past Medical History:   Diagnosis Date     CAD (coronary artery disease)     5/2014 cath, nonbostructive stenosis to LAD, RCA.     Chronic low back pain 1/22/2013     Cocaine abuse, in remission      Fecal urgency 3/8/2012     History of heroin abuse      Hyperlipidemia LDL goal <100 10/31/2010     Hypertension 7/29/2013     Illiterate 8/30/2011     Irritable bowel syndrome      Left cataract      Migraine 4/19/2012      Migraine headache 4/22/2013     Moderate major depression (H) 6/8/2011     Noncompliance with medication regimen 6/8/2011     Obesity      CINDY (obstructive sleep apnea) 3/8/2012    uses CPAP     Osteopenia 10/7/2009     Schizoaffective disorder, depressive type (H) 2/25/2013     Suicidal intent 10/2/2013     Takotsubo cardiomyopathy      Type 2 diabetes mellitus (H) 8/30/2011     Uterine cancer (H) 1983     Verbal auditory hallucination 10/4/2012     Past Surgical History:  Past Surgical History:   Procedure Laterality Date     C OOPHORECTORMY FOR MALIG, W/BX  1983    UTERINE     CATARACT IOL, RT/LT Bilateral 2017     COLONOSCOPY N/A 3/16/2017    Procedure: COLONOSCOPY;  Surgeon: Traci Gonzalez MD;  Location:  GI     Coronary CTA  5/21/2014     HYSTERECTOMY  1983    uterine cancer yearly pap's per provider.     LAPAROSCOPIC CHOLECYSTECTOMY  2008     PHACOEMULSIFICATION CLEAR CORNEA WITH STANDARD INTRAOCULAR LENS IMPLANT Left 5/5/2017    Procedure: PHACOEMULSIFICATION CLEAR CORNEA WITH STANDARD INTRAOCULAR LENS IMPLANT;  LEFT EYE PHACOEMULSIFICATION CLEAR CORNEA WITH STANDARD INTRAOCULAR LENS IMPLANT ;  Surgeon: Tyra Diaz MD;  Location:  [facility]     PHACOEMULSIFICATION CLEAR CORNEA WITH STANDARD INTRAOCULAR LENS IMPLANT Right 6/30/2017    Procedure: PHACOEMULSIFICATION CLEAR CORNEA WITH STANDARD INTRAOCULAR LENS IMPLANT;  RIGHT EYE PHACOEMULSIFICATION CLEAR CORNEA WITH STANDARD INTRAOCULAR LENS IMPLANT;  Surgeon: Tyra Diaz MD;  Location:  [facility]     RELEASE TRIGGER FINGER  10/11/2012    Left thumb. Procedure: RELEASE TRIGGER FINGER;  LEFT THUMB TRIGGER RELEASE;  Surgeon: Tay Langley MD;  Location:  [facility]     RELEASE TRIGGER FINGER Right 12/26/2016    Procedure: RELEASE TRIGGER FINGER;  Surgeon: Albino Castañeda MD;  Location:  OR     Social History:  Social History   Substance Use Topics     Smoking status: Never Smoker     Smokeless tobacco: Never Used     Alcohol use No       Comment: last month     Social History     Social History Narrative     10/2014. Has 2 sons. 7 grandchildren.         Unemployed. Graduated HS.         Tobacco use: Denies    Alcohol use: Escalated use since   10/2014    Drug: Denies     Family History:  Family History   Problem Relation Age of Onset     CANCER Mother      BLADDER     Respiratory Mother      COPD     GASTROINTESTINAL DISEASE Mother      CIRRHOSIS OF LI BOILVAR     Alcohol/Drug Mother      DIABETES Mother      Hypertension Mother      Lipids Mother      C.A.D. Mother      Glaucoma Mother      Alcohol/Drug Sister      MENTAL ILLNESS Sister      Alcohol/Drug Sister      Psychotic Disorder Sister      CANCER Maternal Grandmother      UNKNOWN TYPE     CANCER Brother      COLON     Cancer - colorectal Brother      IN HIS LATE 30S     Alcohol/Drug Brother       OF HEROIN OVERDOSE AT AGE 22 YRS     Macular Degeneration No family hx of      Allergies:  Allergies   Allergen Reactions     Imidazole Antifungals Hives     Tolerates diflucan     Ketoprofen Itching     Pruritis to topical     Lisinopril Hives     Metformin Other (See Comments)     Patient hospitalized for lactic acidosis - admitting provider suspectd caused by metformin     Metronidazole Hives     Posaconazole Hives     Tolerates diflucan     Medications:  Prior to Admission Medications   Prescriptions Last Dose Informant Patient Reported? Taking?   ACETAMINOPHEN PO  Care Giver Yes No   Sig: Take 1,000 mg by mouth every 6 hours as needed for pain or fever   ONE TOUCH ULTRA test strip   No No   Sig: TEST TWICE DAILY AS DIRECTED   SERTRALINE HCL PO   Yes No   Sig: Take 50 mg by mouth daily   TRULICITY 1.5 MG/0.5ML pen   No No   Sig: INJECT 1.5MG SUBCUTANEOUSLY EVERY SEVEN DAYS   ULTICARE MINI 31G X 6 MM insulin pen needle   No No   Sig: USE 4 DAILY OR AS DIRECTED   albuterol (PROAIR HFA/PROVENTIL HFA/VENTOLIN HFA) 108 (90 BASE) MCG/ACT Inhaler   No No   Sig: Inhale 2 puffs into the  lungs every 6 hours as needed for shortness of breath / dyspnea or wheezing   aspirin (ASPIRIN LOW DOSE) 81 MG EC tablet   No No   Sig: Take 1 tablet (81 mg) by mouth daily   atorvastatin (LIPITOR) 80 MG tablet   No No   Sig: TAKE 1 TABLET (80MG) BY MOUTH DAILY   blood glucose monitoring (ONE TOUCH DELICA) lancets   No No   Sig: Use to test blood sugar 2 times daily or as directed.  Ok to substitute alternative if insurance prefers.   blood glucose monitoring (ONE TOUCH VERIO IQ) test strip   No No   Sig: Use to test blood sugar 2 times daily or as directed.  Ok to substitute alternative if insurance prefers.   blood glucose monitoring (ONETOUCH VERIO IQ SYSTEM) meter device kit   No No   Sig: Use to test blood sugar 2 times daily or as directed.  Ok to substitute alternative if insurance prefers.   chlorthalidone (HYGROTON) 25 MG tablet   No No   Sig: Take 1 tablet (25 mg) by mouth daily   gabapentin (NEURONTIN) 300 MG capsule   No No   Sig: TAKE 2 CAPSULES (600MG) BY MOUTH THREE TIMES A DAY   glucose 4 G CHEW   No No   Sig: Take 2 every 15 minutes for blood sugar <70mg/dL. Recheck blood sugar every 15 minutes until above 70mg/dL, then eat a substantial meal.   haloperidol (HALDOL) 5 MG tablet   No No   Sig: Take 1 tablet (5 mg) by mouth 2 times daily   haloperidol (HALDOL) 5 MG tablet   No No   Sig: Take 1 tablet (5 mg) by mouth every 12 hours as needed for agitation   ibuprofen (ADVIL,MOTRIN) 600 MG tablet   No No   Sig: Take 1 tablet (600 mg) by mouth every 4 hours as needed for moderate pain   insulin aspart (NOVOLOG FLEXPEN) 100 UNIT/ML injection   No No   Sig: Inject 22 Units Subcutaneous 3 times daily (with meals) Once daily, can add additional 5 units if BGs are >500mg/dL.   insulin glargine (LANTUS) 100 UNIT/ML injection   No No   Sig: Inject 57 Units Subcutaneous At Bedtime   losartan (COZAAR) 100 MG tablet   No No   Sig: TAKE 1 TABLET (100MG) BY MOUTH DAILY   melatonin 5 MG CAPS   No No   Sig: Take 1  capsule by mouth daily At dinnertime   metoprolol (TOPROL-XL) 100 MG 24 hr tablet   No No   Sig: TAKE 1 TABLET (100MG) BY MOUTH DAILY   order for DME  Self No No   Sig: Equipment being ordered: Rolling walker   order for DME   No No   Sig: Hold Novolog if meals are refused.   order for DME   No No   Sig: Diabetic Shoes   polyethylene glycol (MIRALAX/GLYCOLAX) powder   No No   Sig: TAKE 17 GRAMS (1 CAPFUL) BY MOUTH DAILY   ranitidine (ZANTAC) 300 MG tablet   No No   Sig: TAKE 1 TABLET (300MG) BY MOUTH AT BEDTIME   senna-docusate (SENOKOT-S;PERICOLACE) 8.6-50 MG per tablet  Care Giver Yes No   Sig: Take 1 tablet by mouth 2 times daily as needed for constipation   vitamin D3 (CHOLECALCIFEROL) 33419 UNITS capsule   No No   Sig: TAKE 1 CAPSULE (50,000 UNITS) BY MOUTH ONCE A WEEK      Facility-Administered Medications: None         Review of Systems:  A Comprehensive greater than 10 system review of systems was carried out.  Pertinent positives and negatives are noted above.  Otherwise negative for contributory information.     Physical Exam:  Blood pressure 132/84, pulse 93, temperature 98.2  F (36.8  C), resp. rate 18, last menstrual period 01/06/2015, SpO2 96 %.  Wt Readings from Last 1 Encounters:   11/27/17 104.8 kg (231 lb)     Exam:  General: Alert, awake, no acute distress.  HEENT: NC/AT, eyes anicteric, external occular movements intact, face symmetric.  Dentition WNL, MM moist.  Cardiac: RRR, S1, S2.  No murmurs appreciated.  Pulmonary: Normal chest rise, normal work of breathing.  Lungs CTA BL  Abdomen: soft, non-tender, non-distended.  Bowel Sounds Present.  No guarding.  Extremities: no deformities.  Warm, well perfused.  Skin: no rashes or lesions noted.  Warm and Dry.  Neuro: No focal deficits noted.  Speech clear.  Coordination and strength grossly normal.  Psych: Appropriate affect.    Data:  EKG:  None.  Imaging:  Recent Results (from the past 48 hour(s))   Abdomen XR, 2 vw, flat and upright    Narrative     XR ABDOMEN 2 VW   12/7/2017 9:40 PM      HISTORY:  vomiting, eval for obstruction;     COMPARISON: None.    FINDINGS: The gas pattern is normal. No free air. Surgical clip is  seen in the right upper quadrant of the abdomen. There is a large  amount of stool throughout the colon.      Impression    IMPRESSION:  No evidence for any bowel obstruction..    MARILYN GEIGER MD     Labs:    Recent Labs  Lab 12/07/17 2019   WBC 8.7   HGB 10.8*   HCT 32.3*   MCV 93             Lab Results   Component Value Date     12/07/2017     11/07/2017     10/23/2017    Lab Results   Component Value Date    CHLORIDE 101 12/07/2017    CHLORIDE 104 11/07/2017    CHLORIDE 103 10/23/2017    Lab Results   Component Value Date    BUN 24 12/07/2017    BUN 17 11/07/2017    BUN 23 10/23/2017      Lab Results   Component Value Date    POTASSIUM 3.3 12/07/2017    POTASSIUM 3.9 11/07/2017    POTASSIUM 4.6 10/23/2017    Lab Results   Component Value Date    CO2 25 12/07/2017    CO2 28 11/07/2017    CO2 27 10/23/2017    Lab Results   Component Value Date    CR 1.02 12/07/2017    CR 0.88 11/07/2017    CR 0.98 10/23/2017          Recent Labs  Lab 12/08/17  0002 12/07/17  2304 12/07/17  2201 12/07/17  2100 12/07/17  2019 12/07/17  2018   GLC  --   --   --   --  81  --    * 126* 145* 200*  --  74           Devon Miles MD  Hospitalist  [facility]

## 2017-12-08 NOTE — ED NOTES
"Pt has eaten two packages of saltine crackers, but said she had to stop because she was feeling nauseated. When asked how she is feeling at this moment, pt said \"Just a tiny bit icky.\"  "

## 2017-12-09 VITALS
OXYGEN SATURATION: 94 % | HEART RATE: 88 BPM | WEIGHT: 225 LBS | RESPIRATION RATE: 16 BRPM | SYSTOLIC BLOOD PRESSURE: 120 MMHG | TEMPERATURE: 98.2 F | HEIGHT: 60 IN | BODY MASS INDEX: 44.17 KG/M2 | DIASTOLIC BLOOD PRESSURE: 61 MMHG

## 2017-12-09 LAB
GLUCOSE BLDC GLUCOMTR-MCNC: 111 MG/DL (ref 70–99)
GLUCOSE BLDC GLUCOMTR-MCNC: 124 MG/DL (ref 70–99)
GLUCOSE BLDC GLUCOMTR-MCNC: 173 MG/DL (ref 70–99)
GLUCOSE BLDC GLUCOMTR-MCNC: 215 MG/DL (ref 70–99)
HBA1C MFR BLD: 8.9 % (ref 4.3–6)

## 2017-12-09 PROCEDURE — 25000132 ZZH RX MED GY IP 250 OP 250 PS 637: Mod: GY | Performed by: INTERNAL MEDICINE

## 2017-12-09 PROCEDURE — A9270 NON-COVERED ITEM OR SERVICE: HCPCS | Mod: GY | Performed by: INTERNAL MEDICINE

## 2017-12-09 PROCEDURE — 83036 HEMOGLOBIN GLYCOSYLATED A1C: CPT | Performed by: INTERNAL MEDICINE

## 2017-12-09 PROCEDURE — G0378 HOSPITAL OBSERVATION PER HR: HCPCS

## 2017-12-09 PROCEDURE — 99217 ZZC OBSERVATION CARE DISCHARGE: CPT | Performed by: INTERNAL MEDICINE

## 2017-12-09 PROCEDURE — 36415 COLL VENOUS BLD VENIPUNCTURE: CPT | Performed by: INTERNAL MEDICINE

## 2017-12-09 PROCEDURE — 00000146 ZZHCL STATISTIC GLUCOSE BY METER IP

## 2017-12-09 RX ADMIN — POLYETHYLENE GLYCOL 3350 17 G: 17 POWDER, FOR SOLUTION ORAL at 08:24

## 2017-12-09 RX ADMIN — SERTRALINE HYDROCHLORIDE 50 MG: 50 TABLET ORAL at 08:25

## 2017-12-09 RX ADMIN — LOSARTAN POTASSIUM 100 MG: 100 TABLET ORAL at 08:24

## 2017-12-09 RX ADMIN — GABAPENTIN 600 MG: 300 CAPSULE ORAL at 08:25

## 2017-12-09 RX ADMIN — PROCHLORPERAZINE MALEATE 10 MG: 5 TABLET, FILM COATED ORAL at 09:48

## 2017-12-09 RX ADMIN — METOPROLOL SUCCINATE 100 MG: 100 TABLET, EXTENDED RELEASE ORAL at 08:24

## 2017-12-09 RX ADMIN — HALOPERIDOL 5 MG: 5 TABLET ORAL at 08:24

## 2017-12-09 NOTE — DISCHARGE INSTRUCTIONS
Please check blood sugar before meals and at bedtime. Contact your doctor if blood sugars are consistently over 200.

## 2017-12-09 NOTE — PLAN OF CARE
Problem: Patient Care Overview  Goal: Plan of Care/Patient Progress Review  Outcome: No Change  VSS.  No emesis overnight.  Blood sugar 111 overnight.  Up ind.  Will monitor.

## 2017-12-09 NOTE — DISCHARGE SUMMARY
PRINCIPAL FINAL DIAGNOSIS:  Nausea and vomiting, suspect secondary to viral gastroenteritis.      PAST MEDICAL HISTORY:   1.  Diabetes mellitus, maintained on insulin.   2.  Schizoaffective disorder.  Resides in a group home setting.   3.  Sleep apnea, uses CPAP.   4.  Coronary artery disease with nonobstructive disease by catheterization in 2014.   5.  Hypertension history.      PRINCIPAL PROCEDURES THIS ADMISSION:   1.  Abdominal x-ray.   2.  IV fluid hydration.      REASON FOR ADMISSION:  Please see dictated History and Physical by Dr. Miles.  In brief, Ms. Nena Tang is a 57-year-old female with the above medical history who presented to the hospital for concerns about nausea and vomiting.  Please see dictated History and Physical for details.      HOSPITAL COURSE:   1.  Nausea and vomiting:  Symptoms self-limited and resolved by the time of discharge.  The patient was treated with IV fluids for hydration.  On day of discharge, she was able to tolerate her entire breakfast with no emesis.  She appears stable for discharge from the hospital today.      2.  In regards to her diabetes mellitus, given her decreased oral intake this admission, we did reduce her insulin significantly.  Currently, she is receiving Lantus 30 units at night time.  Previously she was receiving 57 units prior to admission.  She was also receiving NovoLog 22 units with meals at baseline, which has been reduced to 5 units with meals currently.  Her blood sugars have been well controlled on these reduced doses in the hospital, but I expect that as she starts to eat more her blood sugars will start to rise.  Therefore, I recommend that if blood sugar is consistently above 200 at her group home her primary MD should be contacted to assist with titration of her insulin, and likely will need to be titrated in short order back to her previous home insulin dosing.      Plan is for discharge back to group home setting today.       DISCHARGE MEDICATIONS:   1.  Novolog insulin 5 units 3 times a day with meals.  Dose decreased from 22 units 3 times a day with meals previously.   2.  Glargine insulin 30 units at bedtime.  Dose decreased from 57 units at bedtime previously.   3.  Acetaminophen as needed.   4.  Albuterol as needed.   5.  Aspirin 81 mg daily.   6.  Atorvastatin 80 mg daily.   7.  Chlorthalidone 25 mg daily.   8.  Gabapentin 600 mg 3 times a day.   9.  Haldol 5 mg twice a day and q.12h. as needed for agitation.   10.  Ibuprofen as needed for pain.   11.  Losartan 100 mg daily.   12.  Melatonin 5 mg at dinnertime.   13.  Metoprolol  mg daily.   14.  MiraLax 17 g daily.   15.  Zantac 300 mg at bedtime.   16.  Senokot.   17.  Sertraline 50 mg daily.   18.  Trulicity 1.5 mg subcutaneous every 7 days.   19.  Vitamin D.      DISCHARGE INSTRUCTIONS:   You are being discharged on a reduced dose of insulin.  As your diet improves I would expect her blood sugar to climb and you will likely need to increase your insulin dosing.  Monitor your blood sugars 3 times a day at home.  If your blood sugars are climbing consistently above 200, recommend you contact your doctor to get instructions on how much to increase your insulin dosing.         WILFRID MCNULTY MD             D: 2017 15:47   T: 2017 16:23   MT: [id number]      Name:     ERIK BURNHAM   MRN:      [id number]        Account:        [id number]   :      1960           Admit Date:                                       Discharge Date: 2017      Document: [id number]       cc: MICHAELLE CORDOVA, CNP

## 2017-12-09 NOTE — PLAN OF CARE
Problem: Patient Care Overview  Goal: Plan of Care/Patient Progress Review  Outcome: Improving  PRIMARY DIAGNOSIS: GASTROENTERITIS    OUTPATIENT/OBSERVATION GOALS TO BE MET BEFORE DISCHARGE  1. Orthostatic performed: N/A    2. Tolerating PO fluid and/or antibiotics (if applicable): Yes    3. Nausea/Vomiting/Diarrhea symptoms improved: YES; no further episodes of nausea. No emesis today. Had formed BM today.    4. Pain status: Pain free.    5. Return to near baseline physical activity: Yes    Discharge Planner Nurse   Safe discharge environment identified: Yes  Barriers to discharge: No; will D/C home this evening       Entered by: Bhavik Adamson 12/09/2017 3:00 PM     Nausea improved, no emesis today. Had formed BM. Ok to D/C home today. Writer spoke with Zheng, a contact from [facility] , and he confirmed that pt will have a ride to  later this evening around 1700.     Please review provider order for any additional goals.   Nurse to notify provider when observation goals have been met and patient is ready for discharge.

## 2017-12-09 NOTE — PLAN OF CARE
Problem: Patient Care Overview  Goal: Plan of Care/Patient Progress Review  Outcome: Adequate for Discharge Date Met: 12/09/17  Patient discharged at 1157, group home providing transport. Patient and group home staff verbalized understanding of discharge instructions. Patient will follow up with her primary provider for any changes needed with her insulin. Both patient and group home staff did not have any questions.

## 2017-12-09 NOTE — PROGRESS NOTES
Feels better today.  Ate her entire breakfast      Last 24 hours Vitals signs,imaging,microbiology;laboratory results were reviewed by me in EPIC  GENERAL:  Comfortable.  PSYCH: pleasant, oriented, No acute distress.  HEART:  Normal S1, S2 with no edema.  LUNGS:  Clear to auscultation, normal Respiratory effort.  ABDOMEN:  Soft, no hepatosplenomegaly, normal bowel sounds.  SKIN:  Dry to touch, No rash.     Last Basic Metabolic Panel:  Lab Results   Component Value Date     12/07/2017      Lab Results   Component Value Date    POTASSIUM 3.3 12/07/2017     Lab Results   Component Value Date    CHLORIDE 101 12/07/2017     Lab Results   Component Value Date    ALLEN 9.4 12/07/2017     Lab Results   Component Value Date    CO2 25 12/07/2017     Lab Results   Component Value Date    BUN 24 12/07/2017     Lab Results   Component Value Date    CR 1.02 12/07/2017     Lab Results   Component Value Date    GLC 81 12/07/2017          A/p:  N/v - resolved.  Home today.  Back to

## 2017-12-09 NOTE — PLAN OF CARE
Problem: Patient Care Overview  Goal: Plan of Care/Patient Progress Review  Outcome: Improving  PRIMARY DIAGNOSIS: GASTROENTERITIS    OUTPATIENT/OBSERVATION GOALS TO BE MET BEFORE DISCHARGE  1. Orthostatic performed: N/A    2. Tolerating PO fluid and/or antibiotics (if applicable): Yes    3. Nausea/Vomiting/Diarrhea symptoms improved: Yes; still having nausea but controlled with PO compazine    4. Pain status: Pain free.    5. Return to near baseline physical activity: Yes    Discharge Planner Nurse   Safe discharge environment identified: Yes  Barriers to discharge: Yes       Entered by: Bhavik Adamson 12/09/2017 11:34 AM     Vital signs stable. Good PO intake. Nausea after eating but relieved with PO compazine. No emesis. No BM today. Up ind, steady gait.      Please review provider order for any additional goals.   Nurse to notify provider when observation goals have been met and patient is ready for discharge.

## 2017-12-10 NOTE — PROGRESS NOTES
Hand-off for Care Transitions to Next Level of Care Provider  Name: Nena Tang  : 1960  MRN #: [id number]  Reason for Hospitalization:  Hypoglycemia [E16.2]  Intractable vomiting with nausea, unspecified vomiting type [R11.2]  Admit Date/Time: 2017  8:10 PM  Discharge Date:  2017    Reason for Communication Hand-off Referral: Other Nausea & Vomiting    Discharge Plan:  Discharged to: Home with support                   Patient agreeable to post-hospital support suggestions:  Yes  Discharge Plan:  Return to [facility].            Patient is on new medications:   No           MTM follow up recommended: No           Tel-Assurance program:  Ineligible  Follow-up specialty is recommended: No.   Follow-up plan:  Future Appointments  Date Time Provider Department Center   2017 1:30 PM Richardson Lopez, LICSW [facility]   2017 1:30 PM Rowena Haas, APRN [facility]   2018 8:15 AM Tiburcio Chacon MD [facility]     Any outstanding tests or procedures:  No.       Key Recommendations:  a1c 8.9 as of 17 and trending upward.  Pt said recently she has had high readings.  Checks her bg 3x/day and is compliant with her medications.  [facility]    Communicated handoff via EPIC Comm Mgt to Rowena Haas NP's CC at [phone].      Andra Vogt, RN, BSN, CTS  [facility]  [phone]

## 2017-12-15 ENCOUNTER — TELEPHONE (OUTPATIENT)
Dept: FAMILY MEDICINE | Facility: CLINIC | Age: 57
End: 2017-12-15

## 2017-12-15 DIAGNOSIS — Z79.4 TYPE 2 DIABETES MELLITUS WITH MILD NONPROLIFERATIVE RETINOPATHY WITHOUT MACULAR EDEMA, WITH LONG-TERM CURRENT USE OF INSULIN, UNSPECIFIED LATERALITY (H): ICD-10-CM

## 2017-12-15 DIAGNOSIS — E11.3299 TYPE 2 DIABETES MELLITUS WITH MILD NONPROLIFERATIVE RETINOPATHY WITHOUT MACULAR EDEMA, WITH LONG-TERM CURRENT USE OF INSULIN, UNSPECIFIED LATERALITY (H): ICD-10-CM

## 2017-12-15 NOTE — TELEPHONE ENCOUNTER
Reason for Call:  High B/G    Detailed comments: [facility] (group home) called  pt's B/G was 479 today and he's requesting a call back from a nurse to discuss this further.     Phone Number Patient can be reached at: Other phone number:  [facility]  [phone]    Best Time: anytime    Can we leave a detailed message on this number? YES    Call taken on 12/15/2017 at 4:45 PM by Giorgi Case

## 2017-12-18 ENCOUNTER — MEDICAL CORRESPONDENCE (OUTPATIENT)
Dept: HEALTH INFORMATION MANAGEMENT | Facility: CLINIC | Age: 57
End: 2017-12-18

## 2017-12-18 NOTE — TELEPHONE ENCOUNTER
Writer called Alexander back.  After pt was hosptitalized the hospitalist decrease insulin from 22 u at mealtime to 5 units at mealtime.  Pt's BS's have been elevated since.      BS's continue to be high after Hospitalization.      Alexander is concerned that Pt's BS's are still too high and needs adjustment on insulin after hospitalization.    Pt's GH Manager will fax in BS's since when Pt was D/C from hospital.    Indra Le RN

## 2017-12-19 ENCOUNTER — TRANSFERRED RECORDS (OUTPATIENT)
Dept: HEALTH INFORMATION MANAGEMENT | Facility: CLINIC | Age: 57
End: 2017-12-19

## 2017-12-21 ENCOUNTER — MEDICAL CORRESPONDENCE (OUTPATIENT)
Dept: HEALTH INFORMATION MANAGEMENT | Facility: CLINIC | Age: 57
End: 2017-12-21

## 2017-12-28 ENCOUNTER — TELEPHONE (OUTPATIENT)
Dept: FAMILY MEDICINE | Facility: CLINIC | Age: 57
End: 2017-12-28

## 2017-12-28 NOTE — TELEPHONE ENCOUNTER
Occupational Therapy, discharge summary form completed and signed by PCP. Form mailed in the enclosed envelope to [facility] at [address] [place] [address]., [address] [place] [address]. A copy of form placed in abstract.    Jenise Rodriguez MA

## 2017-12-29 ENCOUNTER — OFFICE VISIT (OUTPATIENT)
Dept: FAMILY MEDICINE | Facility: CLINIC | Age: 57
End: 2017-12-29
Payer: MEDICARE

## 2017-12-29 ENCOUNTER — OFFICE VISIT (OUTPATIENT)
Dept: BEHAVIORAL HEALTH | Facility: CLINIC | Age: 57
End: 2017-12-29
Payer: MEDICARE

## 2017-12-29 VITALS
HEART RATE: 85 BPM | OXYGEN SATURATION: 94 % | TEMPERATURE: 98.6 F | DIASTOLIC BLOOD PRESSURE: 66 MMHG | SYSTOLIC BLOOD PRESSURE: 108 MMHG | BODY MASS INDEX: 43.26 KG/M2 | WEIGHT: 221.5 LBS | RESPIRATION RATE: 22 BRPM

## 2017-12-29 DIAGNOSIS — F25.1 SCHIZOAFFECTIVE DISORDER, DEPRESSIVE TYPE (H): Primary | ICD-10-CM

## 2017-12-29 DIAGNOSIS — G47.00 INSOMNIA, UNSPECIFIED TYPE: ICD-10-CM

## 2017-12-29 DIAGNOSIS — Z79.4 TYPE 2 DIABETES MELLITUS WITH MILD NONPROLIFERATIVE RETINOPATHY WITHOUT MACULAR EDEMA, WITH LONG-TERM CURRENT USE OF INSULIN, UNSPECIFIED LATERALITY (H): ICD-10-CM

## 2017-12-29 DIAGNOSIS — R11.0 NAUSEA: ICD-10-CM

## 2017-12-29 DIAGNOSIS — E11.3299 TYPE 2 DIABETES MELLITUS WITH MILD NONPROLIFERATIVE RETINOPATHY WITHOUT MACULAR EDEMA, WITH LONG-TERM CURRENT USE OF INSULIN, UNSPECIFIED LATERALITY (H): ICD-10-CM

## 2017-12-29 PROCEDURE — 90834 PSYTX W PT 45 MINUTES: CPT | Performed by: SOCIAL WORKER

## 2017-12-29 PROCEDURE — 99215 OFFICE O/P EST HI 40 MIN: CPT | Performed by: NURSE PRACTITIONER

## 2017-12-29 RX ORDER — PROCHLORPERAZINE MALEATE 5 MG
5 TABLET ORAL EVERY 6 HOURS PRN
Qty: 90 TABLET | Refills: 0 | Status: SHIPPED | OUTPATIENT
Start: 2017-12-29 | End: 2018-10-17

## 2017-12-29 NOTE — PROGRESS NOTES
SUBJECTIVE:                                                    Nena Tang is a 57 year old  female who presents to clinic today for the following health issues:  [facility]: Yessenia Gorman     Diabetes Follow-up  Blood sugars ranging 180-475, Mostly 200's. Patient reporting chills, feeling sweaty, ongoing shaking that she concerned that is caused by her high blood sugars.   Patient is checking blood sugars: three times daily.   Blood sugar testing frequency justification: high blood sugar  Results are as follows:       am - 188       lunchtime - 194       suppertime - 284      Diabetic concerns: None and blood sugar frequently over 200     Symptoms of hypoglycemia (low blood sugar): shaky, dizzy, weak     Paresthesias (numbness or burning in feet) or sores: No     Date of last diabetic eye exam: yes  BP Readings from Last 2 Encounters:   12/29/17 108/66   12/09/17 120/61     Hemoglobin A1C (%)   Date Value   12/09/2017 8.9 (H)   11/07/2017 8.9 (H)     LDL Cholesterol Calculated (mg/dL)   Date Value   06/27/2017 64   07/14/2015 78     LDL Cholesterol Direct (mg/dL)   Date Value   07/13/2016 137 (H)         Amount of exercise or physical activity: None    Problems taking medications regularly: No    Medication side effects: none    Diet: regular (no restrictions)    Mental Health Follow up   Patient states that her mood is still down and she is still having thoughts of wanting to harm her self. Patient sees her therapist biweekly at [facility], and also see a psychiatrist. Patient states that she continues to struggle with her mood especially during the winter months especially with the anniversary for her . Patient states that she has been using her PRN haldol when she is feeling like she wants to harm herself or others.   We discussed in length about her diabetes care and management. Patient stated that she would kill herself if she was to go blind due to her diabetes. Writer discussed  that patient has to take some responsibility in the choices she makes in her daily life that would worsen her diabetes like making the right food choices, increasing the activity level and taking her insulin as prescribed even when she is not at her foster home.     Status since last visit: no change.     See PHQ-9 for current symptoms.  Other associated symptoms: None    Complicating factors: depressed mood. Having increased restlessness from the increased haldol dosing.   Significant life event:  No   Current substance abuse:  None  Anxiety or Panic symptoms:  No    Sleep - having difficulty falling asleep or staying asleep.   Appetite - low appetite with the up and down mood swings.   Exercise - no    Smoking - no  Alcohol - no  Street drugs - no  Marijuana - no  Caffeine - no.     PHQ-9  English PHQ-9   Any Language             Problems taking medications regularly: No    Medication side effects: none    Diet: regular (no restrictions)  Social History   Substance Use Topics     Smoking status: Never Smoker     Smokeless tobacco: Never Used     Alcohol use No      Comment: last month        Problem list and histories reviewed & adjusted, as indicated.  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)     Illiterate     Irritable bowel syndrome     overweight - BMI >35     Takotsubo cardiomyopathy     CAD (coronary artery disease)     Restless legs syndrome (RLS)     CINDY (obstructive sleep apnea)- mild AHI 10.3     Verbal auditory hallucination     Chronic low back pain     Schizoaffective disorder, depressive type (H)     Migraine headache     HTN, goal below 140/90     Health Care Home     Lumbago     Cervicalgia     Cocaine abuse, episodic     Suicidal ideation      Esophageal reflux     Mild nonproliferative diabetic retinopathy (H)     Tardive dyskinesia     Alcohol use     Left cataract     Falls frequently     History of uterine cancer     Depression     Psychophysiological insomnia     Dysuria     Asymptomatic postmenopausal status     Abdominal pain, right lower quadrant     Sepsis (H)     Pneumonia of right lower lobe due to infectious organism (H)     Infectious encephalopathy     Non-intractable vomiting with nausea     Acute respiratory failure with hypoxia (H)     Thoughts of self harm     Gastroenteritis     Past Surgical History:   Procedure Laterality Date     C OOPHORECTORMY FOR MALIG, W/BX  1983    UTERINE     CATARACT IOL, RT/LT Bilateral 2017     COLONOSCOPY N/A 3/16/2017    Procedure: COLONOSCOPY;  Surgeon: Traci Gonzalez MD;  Location:  GI     Coronary CTA  5/21/2014     HYSTERECTOMY  1983    uterine cancer yearly pap's per provider.     LAPAROSCOPIC CHOLECYSTECTOMY  2008     PHACOEMULSIFICATION CLEAR CORNEA WITH STANDARD INTRAOCULAR LENS IMPLANT Left 5/5/2017    Procedure: PHACOEMULSIFICATION CLEAR CORNEA WITH STANDARD INTRAOCULAR LENS IMPLANT;  LEFT EYE PHACOEMULSIFICATION CLEAR CORNEA WITH STANDARD INTRAOCULAR LENS IMPLANT ;  Surgeon: Tyra Diaz MD;  Location[facility]     PHACOEMULSIFICATION CLEAR CORNEA WITH STANDARD INTRAOCULAR LENS IMPLANT Right 6/30/2017    Procedure: PHACOEMULSIFICATION CLEAR CORNEA WITH STANDARD INTRAOCULAR LENS IMPLANT;  RIGHT EYE PHACOEMULSIFICATION CLEAR CORNEA WITH STANDARD INTRAOCULAR LENS IMPLANT;  Surgeon: Tyra Diaz MD;  Location:  EC     RELEASE TRIGGER FINGER  10/11/2012    Left thumb. Procedure: RELEASE TRIGGER FINGER;  LEFT THUMB TRIGGER RELEASE;  Surgeon: Tay Langley MD;  Location:  SD     RELEASE TRIGGER FINGER Right 12/26/2016    Procedure: RELEASE TRIGGER FINGER;  Surgeon: Albino Castañeda MD;  Location:  OR       Social History   Substance Use Topics     Smoking status:  Never Smoker     Smokeless tobacco: Never Used     Alcohol use No      Comment: last month     Family History   Problem Relation Age of Onset     CANCER Mother      BLADDER     Respiratory Mother      COPD     GASTROINTESTINAL DISEASE Mother      CIRRHOSIS OF LI BOLIVAR     Alcohol/Drug Mother      DIABETES Mother      Hypertension Mother      Lipids Mother      C.A.D. Mother      Glaucoma Mother      Alcohol/Drug Sister      MENTAL ILLNESS Sister      Alcohol/Drug Sister      Psychotic Disorder Sister      CANCER Maternal Grandmother      UNKNOWN TYPE     CANCER Brother      COLON     Cancer - colorectal Brother      IN HIS LATE 30S     Alcohol/Drug Brother       OF HEROIN OVERDOSE AT AGE 22 YRS     Macular Degeneration No family hx of            Current Outpatient Prescriptions   Medication Sig Dispense Refill     insulin aspart (NOVOLOG FLEXPEN) 100 UNIT/ML injection Inject 15 Units Subcutaneous 3 times daily (with meals) Once daily, can add additional 5 units if BGs are >500mg/dL. 15 mL 11     insulin glargine (LANTUS) 100 UNIT/ML injection Inject 30 Units Subcutaneous At Bedtime 3 mL 11     SERTRALINE HCL PO Take 50 mg by mouth daily       ranitidine (ZANTAC) 300 MG tablet TAKE 1 TABLET (300MG) BY MOUTH AT BEDTIME 90 tablet 1     haloperidol (HALDOL) 5 MG tablet Take 1 tablet (5 mg) by mouth every 12 hours as needed for agitation 30 tablet 0     order for DME Diabetic Shoes 2 each 0     losartan (COZAAR) 100 MG tablet TAKE 1 TABLET (100MG) BY MOUTH DAILY 28 tablet 3     chlorthalidone (HYGROTON) 25 MG tablet Take 1 tablet (25 mg) by mouth daily 30 tablet 3     gabapentin (NEURONTIN) 300 MG capsule TAKE 2 CAPSULES (600MG) BY MOUTH THREE TIMES A  capsule 3     blood glucose monitoring (ONETOUCH VERIO IQ SYSTEM) meter device kit Use to test blood sugar 2 times daily or as directed.  Ok to substitute alternative if insurance prefers. 1 kit 0     blood glucose monitoring (ONE TOUCH VERIO IQ) test strip Use to  test blood sugar 2 times daily or as directed.  Ok to substitute alternative if insurance prefers. 150 strip 11     blood glucose monitoring (ONE TOUCH DELICA) lancets Use to test blood sugar 2 times daily or as directed.  Ok to substitute alternative if insurance prefers. 150 each 11     vitamin D3 (CHOLECALCIFEROL) 12817 UNITS capsule TAKE 1 CAPSULE (50,000 UNITS) BY MOUTH ONCE A WEEK 4 capsule 3     albuterol (PROAIR HFA/PROVENTIL HFA/VENTOLIN HFA) 108 (90 BASE) MCG/ACT Inhaler Inhale 2 puffs into the lungs every 6 hours as needed for shortness of breath / dyspnea or wheezing 3 Inhaler 1     senna-docusate (SENOKOT-S;PERICOLACE) 8.6-50 MG per tablet Take 1 tablet by mouth 2 times daily as needed for constipation       ACETAMINOPHEN PO Take 1,000 mg by mouth every 6 hours as needed for pain or fever       atorvastatin (LIPITOR) 80 MG tablet TAKE 1 TABLET (80MG) BY MOUTH DAILY 28 tablet 11     metoprolol (TOPROL-XL) 100 MG 24 hr tablet TAKE 1 TABLET (100MG) BY MOUTH DAILY 28 tablet 11     polyethylene glycol (MIRALAX/GLYCOLAX) powder TAKE 17 GRAMS (1 CAPFUL) BY MOUTH DAILY 527 g 3     ONE TOUCH ULTRA test strip TEST TWICE DAILY AS DIRECTED 150 strip 8     TRULICITY 1.5 MG/0.5ML pen INJECT 1.5MG SUBCUTANEOUSLY EVERY SEVEN DAYS 2 mL 11     ULTICARE MINI 31G X 6 MM insulin pen needle USE 4 DAILY OR AS DIRECTED 100 each 11     aspirin (ASPIRIN LOW DOSE) 81 MG EC tablet Take 1 tablet (81 mg) by mouth daily 100 tablet 4     ibuprofen (ADVIL,MOTRIN) 600 MG tablet Take 1 tablet (600 mg) by mouth every 4 hours as needed for moderate pain 60 tablet 0     melatonin 5 MG CAPS Take 1 capsule by mouth daily At dinnertime 90 capsule 1     glucose 4 G CHEW Take 2 every 15 minutes for blood sugar <70mg/dL. Recheck blood sugar every 15 minutes until above 70mg/dL, then eat a substantial meal. 20 tablet 1     order for DME Hold Novolog if meals are refused. 1 each 0     order for DME Equipment being ordered: Rolling walker 1 Device 0      Allergies   Allergen Reactions     Imidazole Antifungals Hives     Tolerates diflucan     Ketoprofen Itching     Pruritis to topical     Lisinopril Hives     Metformin Other (See Comments)     Patient hospitalized for lactic acidosis - admitting provider suspectd caused by metformin     Metronidazole Hives     Posaconazole Hives     Tolerates diflucan     Recent Labs   Lab Test  12/09/17   0748  12/07/17 2019 11/07/17   0801  10/23/17   1515   08/30/17   0738   06/27/17   1358   12/29/16   0909   07/13/16   1350   07/14/15   1215   09/03/13   1156   A1C  8.9*   --   8.9*  8.8*   --   7.6*   --   7.0*   < >   --    < >   --    < >  9.1*   < >  10.8*   LDL   --    --    --    --    --    --    --   64   --    --    --   137*   --   78   < >  90   HDL   --    --    --    --    --    --    --   37*   --    --    --    --    --   39*   --   37*   TRIG   --    --    --    --    --    --    --   267*   --    --    --    --    --   151*   --   171*   ALT   --    --   23  23   --   34   < >  32   < >  26   < >   --    < >   --    < >  38   CR   --   1.02  0.88  0.98   < >  0.88   < >  0.78   < >  0.72   < >   --    < >  0.67   < >  0.54   GFRESTIMATED   --   56*  66  58*   < >  66   < >  76   < >  83   < >   --    < >  >90  Non  GFR Calc     < >  >90   GFRESTBLACK   --   68  80  71   < >  80   < >  >90   GFR Calc     < >  >90   GFR Calc     < >   --    < >  >90   GFR Calc     < >  >90   POTASSIUM   --   3.3*  3.9  4.6   < >  4.0   < >  4.3   < >  4.2   < >   --    < >  4.3   < >  4.4   TSH   --    --   3.21   --    --    --    --    --    --   2.24   < >   --    < >   --    < >  1.42    < > = values in this interval not displayed.      BP Readings from Last 3 Encounters:   12/09/17 120/61   11/27/17 100/72   11/10/17 149/54    Wt Readings from Last 3 Encounters:   12/09/17 225 lb (102.1 kg)   11/27/17 231 lb (104.8 kg)   11/09/17 230 lb 1.6 oz (104.4 kg)         Labs reviewed in EPIC  Problem list, Medication list, Allergies, and Medical/Social/Surgical histories reviewed in [facility] and updated as appropriate.     ROS: Constitutional, neuro, ENT, endocrine, pulmonary, cardiac, gastrointestinal, genitourinary, musculoskeletal, integument and psychiatric systems are negative, except as otherwise noted above in the HPI.   OBJECTIVE:                                                    /66  Pulse 85  Temp 98.6  F (37  C) (Oral)  Resp 22  Wt 221 lb 8 oz (100.5 kg)  LMP 01/06/2015  SpO2 94%  BMI 43.26 kg/m2  Body mass index is 43.26 kg/(m^2).  GENERAL: healthy, alert, well nourished, well hydrated, no distress  EYES: Eyes grossly normal to inspection, extraocular movements - intact, and PERRL  HENT: ear canals- normal; TMs- normal; Nose- normal; Mouth- no ulcers, no lesions  NECK: no tenderness, no adenopathy, no asymmetry, no masses, no stiffness; thyroid- normal to palpation  RESP: lungs clear to auscultation - no rales, no rhonchi, no wheezes  CV: regular rates and rhythm, normal S1 S2, no S3 or S4 and no murmur, no click or rub - no homans or cords  ABDOMEN: soft, no tenderness, no  hepatosplenomegaly, no masses, normal bowel sounds  MS: extremities- no gross deformities noted, no edema  SKIN: no suspicious lesions, no rashes  NEURO: strength and tone- normal, sensory exam- grossly normal, mentation- intact, speech- normal, reflexes- symmetric  Non focal no aphasia. No facial asymmetry.  BACK: no CVA tenderness, no paralumbar tenderness  LYMPHATICS: ant. cervical- normal, post. cervical- normal, axillary- normal, supraclavicular- normal.    Mental Status Assessment:  Appearance:   Appropriate   Eye Contact:   Good   Psychomotor Behavior: Restless   Attitude:   Cooperative   Orientation:   All  Speech   Rate / Production: Normal    Volume:  Normal   Mood:    Depressed  Sad   Affect:    Appropriate   Thought Content:  Clear   Thought Form:  Coherent    Insight:    Poor   Attention Span and Concentration:  appropriate  Recent and Remote Memory:  intact  Fund of Knowledge: appropriate  Muscle Strength and Tone: normal   Suicidal Ideation: reports thoughts, no intention  Hallucination: Yes  Paranoid-No  Manic-No  Panic-No  Self harm-No    See [facility] notes     Diagnostic Test Results:  none      ASSESSMENT/PLAN:                                                    (F25.1) Schizoaffective disorder, depressive type (H)  (primary encounter diagnosis)  Comment: as noted above.   Plan: Encourage patient to follow-up with her psychiatrist for medication management.   -Discussed patient working with her therapist weekly to help with her behavioral issues that are concerning to the staff at her foster home.     (E11.3299,  Z79.4) 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, with long-term current use of insulin, unspecified laterality (H)  Comment: see above.   Plan: insulin glargine (LANTUS) 100 UNIT/ML         injection, insulin aspart (NOVOLOG FLEXPEN) 100        UNIT/ML injection        Insulin dosages increased.   -Care team will send blood sugars results through the weekend to the office for insulin adjustment.     (R11.0) Nausea  Comment: Patient reporting some nausea that has increased with her anxiety and high blood sugar incidences. Patient denies any vomiting.   Plan: prochlorperazine (COMPAZINE) 5 MG tablet      (G47.00) Insomnia, unspecified type  Comment: Patient reports trouble falling and staying asleep. Patient reports that her sleep is usually better when she uses her CPAP at night time and keeps her bedside lamp on.   Plan: Encouraged patient to use her CPAP routinely for better sleep.       Patient Instructions   -Prochlorperazine 5 mg every 6 hours as needed for nausea.   -Increased Insulin doses; Please send blood sugar report for the weekend on Tuesday for insulin adjustment.   -Call clinic with any questions or  concerns.     I spent Greater than 40 minutes was spent face to face with Nena Tang, with greater than 50 % in counselling and consultation about diabetes management, insomnia, and nausea.     ART Fay [facility] PRIMARY CARE

## 2017-12-29 NOTE — PROGRESS NOTES
[facility] - Integrated Primary Care   December 29, 2017      Behavioral Health Clinician Progress Note    Patient Name: Nena Tang           Service Type: Individual      Service Location:   Face to Face in Clinic     Session Start Time: 2:05pm  Session End Time: 2:44pm      Session Length: 38 - 52      Attendees: Client, PCP and caregiver    Visit Activities (Refresh list every visit): [facility] [employer]    Diagnostic Assessment Date: Not completed  Treatment Plan Review Date: Not completed  See Flowsheets for today's PHQ-9 and TAMIA-7 results  Previous PHQ-9:   PHQ-9 SCORE 12/20/2016 1/2/2017 2/3/2017   Total Score - - -   Total Score - - -   Total Score 7 0 0     Previous TAMIA-7:   TAMIA-7 SCORE 12/20/2016 1/2/2017 2/3/2017   Total Score - - -   Total Score 6 0 0   Total Score - - -       ALEXANDRA LEVEL:  ALEXANDRA Score (Last Two) 8/30/2011 6/9/2014   ALEXANDRA Raw Score 42 37   Activation Score 66 49.9   ALEXANDRA Level 3 2       DATA  Extended Session (60+ minutes): No  Interactive Complexity: No  Crisis: No    Treatment Objective(s) Addressed in This Session:  Target Behavior(s): disease management/lifestyle changes Mental Health    Depressed Mood: Increase interest, engagement, and pleasure in doing things  Decrease frequency and intensity of feeling down, depressed, hopeless  Improve quantity and quality of night time sleep / decrease daytime naps  Improve diet, appetite, mindful eating, and / or meal planning  Identify negative self-talk and behaviors: challenge core beliefs, myths, and actions  Improve concentration, focus, and mindfulness in daily activities   Feel less fidgety, restless or slow in daily activities / interpersonal interactions  Decrease thoughts that you'd be better off dead or of suicide / self-harm  Anxiety: will experience a reduction in anxiety, will develop more effective coping skills to manage anxiety symptoms, will develop healthy cognitive patterns and beliefs and will increase ability to function  adaptively  Mood Instability: will develop better understanding of triggers and coping strategies to stabilize mood  Risk / Safety: will develop strategies for more effective management of risk issues and will follow safety plan (in EMR) for more effective management of risk issues  Grief / Loss: will increase understanding of normal grieving process, will engage in effective approach to address and resolve grief/loss issues and will process grief/loss issues in an adaptive manner  Thought Disturbance: will develop skills to more effectively manage symptoms, will develop more effective support systems and will continue to take medications as prescribed    Current Stressors / Issues:    [facility] met with patient at PCP request to assess current behavioral health needs and provide information and support as necessary.  Pt reported that she continues to struggle with sadness on a daily basis. Pt stated that after being discharged from the hospital her mood had increased for a period of time, however, now she is experiencing the same depression as before. Pt stated that she does have thoughts to hurt herself and commit suicide however denied a plan and stated that she always tells staff when she is feeling that way. Pt stated that she has a PRN of Haldol that is helpful when she is feeling that way. Pt stated that she is also constantly anxious. Pt stated that going to the mental health drop in center is helpful to distract herself from her anxiety and depression. Pt stated that she is does typically struggle this time of year as the holidays are difficult. Pt's caregiver stated that it is difficult to help pt manage her moods because a lot of time she does not follow through on what they suggest doing. Pt stated that she lacks the motivation to follow and she is not doing it intentionally, she just lacks desire to do things. Pt stated that she is not exercising and is likely not drinking enough water. Pt stated that her  blood sugars have been very high since discharging from the hospital as they changed her medications while there. Pt stated that she will try to watch her intake, but it is difficult at times. Pt reported that she sees her psychiatrist and therapist in the next couple of weeks.    Reviewed new and ongoing stressors  Reviewed mental health symptoms and appropriate coping mechanisms    I affirmed the steps this patient has taken to address physical and behavioral health issues, and offered continued behavioral health services or referral, now or in the future, as needed by the patient.    Progress on Treatment Objective(s) / Homework:  Worsening - PREPARATION (Decided to change - considering how); Intervened by negotiating a change plan and determining options / strategies for behavior change, identifying triggers, exploring social supports, and working towards setting a date to begin behavior change    Motivational Interviewing    MI Intervention: Expressed Empathy/Understanding, Supported Autonomy, Collaboration, Evocation, Permission to raise concern or advise, Open-ended questions and Reflections: simple and complex     Change Talk Expressed by the Patient: Desire to change Reasons to change Need to change Committment to change Activation Taking steps    Provider Response to Change Talk: E - Evoked more info from patient about behavior change, A - Affirmed patient's thoughts, decisions, or attempts at behavior change, R - Reflected patient's change talk and S - Summarized patient's change talk statements    Also provided psychoeducation about behavioral health condition, symptoms, and treatment options    Care Plan review completed: No    Medication Review:  See medication list    Medication Compliance:  NA    Changes in Health Issues:   None reported    Chemical Use Review:   Substance Use: Chemical use reviewed, no active concerns identified      Tobacco Use: No current tobacco use.      Assessment: Current  Emotional / Mental Status (status of significant symptoms):  Risk status (Self / Other harm or suicidal ideation)  Patient has had a history of suicidal ideation: yes in the past  Patient denies current fears or concerns for personal safety.  Patient reports the following current or recent suicidal ideation or behaviors: sucidal thoughts with plan to stay safe.  Patient denies current or recent homicidal ideation or behaviors.  Patient reports current or recent self injurious behavior or ideation including no current thoughts and she was able to contract for safety.  Patient denies other safety concerns.  A safety and risk management plan has not been developed at this time, however patient was encouraged to call [place] [phone] should there be a change in any of these risk factors.    Appearance:   Appropriate   Eye Contact:   Good   Psychomotor Behavior: Normal   Attitude:   Cooperative   Orientation:   All  Speech   Rate / Production: Normal    Volume:  Normal   Mood:    Depressed   Affect:    Blunted  Lethargic   Thought Content:  Clear   Thought Form:  Coherent  Logical   Insight:    Fair     Diagnoses:  1. Schizoaffective disorder, depressive type (H)        Collateral Reports Completed:  Not Applicable    Plan: (Homework, other):  Patient was given information about behavioral services and encouraged to schedule a follow up appointment with the clinic [facility] as needed.  She was also given information about mental health symptoms and treatment options .  CD Recommendations: Maintain Sobriety.  BIN Reyes, [facility] February 3, 2017

## 2017-12-29 NOTE — PATIENT INSTRUCTIONS
-Prochlorperazine 5 mg every 6 hours as needed for nausea.   -Increased Insulin doses; Please send blood sugar report for the weekend on Tuesday for insulin adjustment.   -Call clinic with any questions or concerns.

## 2017-12-29 NOTE — MR AVS SNAPSHOT
After Visit Summary   12/29/2017    Nena Tang    MRN: [id number]           Patient Information     Date Of Birth          1960        Visit Information        Provider Department      12/29/2017 1:30 PM Rowena Haas APRN CNP [facility]        Today's Diagnoses     Nausea    -  1    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, with long-term current use of insulin, unspecified laterality (H)        Insomnia, unspecified type          Care Instructions    -Prochlorperazine 5 mg every 6 hours as needed for nausea.   -Increased Insulin doses; Please send blood sugar report for the weekend on Tuesday for insulin adjustment.   -Call clinic with any questions or concerns.             Follow-ups after your visit        Your next 10 appointments already scheduled     Jan 31, 2018  8:15 AM CST   RETURN RETINA with Tiburcio Chacon MD   Eye Clinic ([facility])    [address]  [address]  [address] [address]  [facility] [address] [phone]              Who to contact     If you have questions or need follow up information about today's clinic visit or your schedule please contact [facility] directly at [phone].  Normal or non-critical lab and imaging results will be communicated to you by MyChart, letter or phone within 4 business days after the clinic has received the results. If you do not hear from us within 7 days, please contact the clinic through [employer] or phone. If you have a critical or abnormal lab result, we will notify you by phone as soon as possible.  Submit refill requests through [employer] or call your pharmacy and they will forward the refill request to us. Please allow 3 business days for your refill to be completed.          Additional Information About Your Visit        [employer] Information     [employer] gives you secure access to  your electronic health record. If you see a primary care provider, you can also send messages to your care team and make appointments. If you have questions, please call your primary care clinic.  If you do not have a primary care provider, please call [phone] and they will assist you.        Care EveryWhere ID     This is your Care EveryWhere ID. This could be used by other organizations to access your [place] medical records  [id number]        Your Vitals Were     Pulse Temperature Respirations Last Period Pulse Oximetry BMI (Body Mass Index)    85 98.6  F (37  C) (Oral) 22 01/06/2015 94% 43.26 kg/m2       Blood Pressure from Last 3 Encounters:   12/29/17 108/66   12/09/17 120/61   11/27/17 100/72    Weight from Last 3 Encounters:   12/29/17 221 lb 8 oz (100.5 kg)   12/09/17 225 lb (102.1 kg)   11/27/17 231 lb (104.8 kg)              Today, you had the following     No orders found for display         Today's Medication Changes          These changes are accurate as of: 12/29/17  2:41 PM.  If you have any questions, ask your nurse or doctor.               Start taking these medicines.        Dose/Directions    prochlorperazine 5 MG tablet   Commonly known as:  COMPAZINE   Used for:  Nausea   Started by:  Rowena Haas APRN CNP        Dose:  5 mg   Take 1 tablet (5 mg) by mouth every 6 hours as needed for nausea or vomiting   Quantity:  90 tablet   Refills:  0         These medicines have changed or have updated prescriptions.        Dose/Directions    insulin aspart 100 UNIT/ML injection   Commonly known as:  NovoLOG FLEXPEN   This may have changed:  how much to take   Used for:  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, with long-term current use of insulin, unspecified laterality (H)   Changed by:  Rowena Haas APRN CNP        Dose:  20 Units   Inject 20 Units Subcutaneous 3 times daily (with meals) Once daily, can add additional 5 units if BGs are  >500mg/dL.   Quantity:  15 mL   Refills:  11       insulin glargine 100 UNIT/ML injection   Commonly known as:  LANTUS   This may have changed:  how much to take   Used for:  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, with long-term current use of insulin, unspecified laterality (H)   Changed by:  Rowena Haas APRN CNP        Dose:  35 Units   Inject 35 Units Subcutaneous At Bedtime   Quantity:  3 mL   Refills:  11            Where to get your medicines      These medications were sent to [address] [address]     Phone:  [phone]     insulin aspart 100 UNIT/ML injection    insulin glargine 100 UNIT/ML injection    prochlorperazine 5 MG tablet                Primary Care Provider Office Phone # Fax #    ART Arias [phone]       [address] [facility] [address]  [facility] [address]        Goals        General    Medical (pt-stated)     Notes - Note created  7/7/2016  9:15 AM by Chelsea Pulido, RN    Get blood sugars under control    Improve medication compliance    As of today's date 7/7/2016 goal is met at 0 - 25%.   Goal Status:  Active          Equal Access to Services     RAMESH GARRIDO AH: Hadii samira ku hadasho Soomaali, waaxda luqadaha, qaybta kaalmada adeegyada, waxay idiin hayfernando sinclair khmelody ellison . So [facility] [phone].    ATENCIÓN: Si habla español, tiene a trinh disposición servicios gratuitos de asistencia lingüística. Llame al [phone].    We comply with applicable federal civil rights laws and [place] laws. We do not discriminate on the basis of race, color, national origin, age, disability, sex, sexual orientation, or gender identity.            Thank you!     Thank you for choosing [facility]  for your care. Our goal is always to provide you with excellent care. Hearing back from our patients is  one way we can continue to improve our services. Please take a few minutes to complete the written survey that you may receive in the mail after your visit with us. Thank you!             Your Updated Medication List - Protect others around you: Learn how to safely use, store and throw away your medicines at www.disposemymeds.org.          This list is accurate as of: 12/29/17  2:41 PM.  Always use your most recent med list.                   Brand Name Dispense Instructions for use Diagnosis    ACETAMINOPHEN PO      Take 1,000 mg by mouth every 6 hours as needed for pain or fever        albuterol 108 (90 BASE) MCG/ACT Inhaler    PROAIR HFA/PROVENTIL HFA/VENTOLIN HFA    3 Inhaler    Inhale 2 puffs into the lungs every 6 hours as needed for shortness of breath / dyspnea or wheezing    Dyspnea on exertion       aspirin 81 MG EC tablet    ASPIRIN LOW DOSE    100 tablet    Take 1 tablet (81 mg) by mouth daily    Coronary artery disease involving native heart with angina pectoris, unspecified vessel or lesion type (H)       atorvastatin 80 MG tablet    LIPITOR    28 tablet    TAKE 1 TABLET (80MG) BY MOUTH DAILY    Hyperlipidemia LDL goal <100       blood glucose monitoring lancets     150 each    Use to test blood sugar 2 times daily or as directed.  Ok to substitute alternative if insurance prefers.    Type 2 diabetes mellitus with diabetic neuropathy, with long-term current use of insulin (H)       blood glucose monitoring meter device kit     1 kit    Use to test blood sugar 2 times daily or as directed.  Ok to substitute alternative if insurance prefers.    Type 2 diabetes mellitus with diabetic neuropathy, with long-term current use of insulin (H)       chlorthalidone 25 MG tablet    HYGROTON    30 tablet    Take 1 tablet (25 mg) by mouth daily    HTN, goal below 140/90       gabapentin 300 MG capsule    NEURONTIN    168 capsule    TAKE 2 CAPSULES (600MG) BY MOUTH THREE TIMES A DAY    Type 2 diabetes mellitus with  diabetic neuropathy, with long-term current use of insulin (H)       glucose 4 G Chew chewable tablet     20 tablet    Take 2 every 15 minutes for blood sugar <70mg/dL. Recheck blood sugar every 15 minutes until above 70mg/dL, then eat a substantial meal.    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, with long-term current use of insulin, unspecified laterality (H)       haloperidol 5 MG tablet    HALDOL    30 tablet    Take 1 tablet (5 mg) by mouth every 12 hours as needed for agitation    Schizoaffective disorder, depressive type (H)       ibuprofen 600 MG tablet    ADVIL/MOTRIN    60 tablet    Take 1 tablet (600 mg) by mouth every 4 hours as needed for moderate pain    Closed fracture of one rib of right side, initial encounter       insulin aspart 100 UNIT/ML injection    NovoLOG FLEXPEN    15 mL    Inject 20 Units Subcutaneous 3 times daily (with meals) Once daily, can add additional 5 units if BGs are >500mg/dL.    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, with long-term current use of insulin, unspecified laterality (H)       insulin glargine 100 UNIT/ML injection    LANTUS    3 mL    Inject 35 Units Subcutaneous At Bedtime    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, with long-term current use of insulin, unspecified laterality (H)       losartan 100 MG tablet    COZAAR    28 tablet    TAKE 1 TABLET (100MG) BY MOUTH DAILY    Coronary artery disease involving native heart with angina pectoris, unspecified vessel or lesion type (H)       melatonin 5 MG Caps     90 capsule    Take 1 capsule by mouth daily At dinnertime    Insomnia, unspecified type       metoprolol 100 MG 24 hr tablet    TOPROL-XL    28 tablet    TAKE 1 TABLET (100MG) BY MOUTH DAILY    Coronary artery disease involving native heart with angina pectoris, unspecified vessel or lesion type (H)       * ONETOUCH ULTRA  test strip   Generic drug:  blood glucose monitoring     150 strip    TEST TWICE DAILY AS DIRECTED    Type 2 diabetes mellitus with diabetic neuropathy, with long-term current use of insulin (H)       * blood glucose monitoring test strip    ONETOUCH VERIO IQ    150 strip    Use to test blood sugar 2 times daily or as directed.  Ok to substitute alternative if insurance prefers.    Type 2 diabetes mellitus with diabetic neuropathy, with long-term current use of insulin (H)       * order for DME     1 Device    Equipment being ordered: Rolling walker    Falls frequently       * order for DME     1 each    Hold Novolog if meals are refused.    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, with long-term current use of insulin, unspecified laterality (H)       polyethylene glycol powder    MIRALAX/GLYCOLAX    527 g    TAKE 17 GRAMS (1 CAPFUL) BY MOUTH DAILY    Constipation, unspecified constipation type       prochlorperazine 5 MG tablet    COMPAZINE    90 tablet    Take 1 tablet (5 mg) by mouth every 6 hours as needed for nausea or vomiting    Nausea       ranitidine 300 MG tablet    ZANTAC    90 tablet    TAKE 1 TABLET (300MG) BY MOUTH AT BEDTIME    Gastroesophageal reflux disease without esophagitis       senna-docusate 8.6-50 MG per tablet    SENOKOT-S;PERICOLACE     Take 1 tablet by mouth 2 times daily as needed for constipation        SERTRALINE HCL PO      Take 50 mg by mouth daily        TRULICITY 1.5 MG/0.5ML pen   Generic drug:  dulaglutide     2 mL    INJECT 1.5MG SUBCUTANEOUSLY EVERY SEVEN DAYS    Type 2 diabetes mellitus with mild nonproliferative retinopathy without macular edema, with long-term current use of insulin, unspecified laterality (H)       ULTICARE  MINI 31G X 6 MM   Generic drug:  insulin pen needle     100 each    USE 4 DAILY OR AS DIRECTED    Type 2 diabetes mellitus with mild nonproliferative retinopathy without macular edema, with long-term current use of insulin, unspecified laterality (H)       vitamin D3 13181 UNITS capsule    CHOLECALCIFEROL    4 capsule    TAKE 1 CAPSULE (50,000 UNITS) BY MOUTH ONCE A WEEK    Vitamin D deficiency       * Notice:  This list has 5 medication(s) that are the same as other medications prescribed for you. Read the directions carefully, and ask your doctor or other care provider to review them with you.

## 2017-12-29 NOTE — MR AVS SNAPSHOT
After Visit Summary   12/29/2017    Nena Tang    MRN: [id number]           Patient Information     Date Of Birth          1960        Visit Information        Provider Department      12/29/2017 1:30 PM [facility]        Today's Diagnoses     Schizoaffective disorder, depressive type (H)    -  1       Follow-ups after your visit        Your next 10 appointments already scheduled     Jan 31, 2018  8:15 AM CST   RETURN RETINA with Tiburcio Chacon MD   [facility] Clinic ([facility])    [address]  [address]  [address] [address]  [facility] [address] [phone]              Who to contact     If you have questions or need follow up information about today's clinic visit or your schedule please contact [facility] PRIMARY [facility] directly at [phone].  Normal or non-critical lab and imaging results will be communicated to you by MyChart, letter or phone within 4 business days after the clinic has received the results. If you do not hear from us within 7 days, please contact the clinic through MyChart or phone. If you have a critical or abnormal lab result, we will notify you by phone as soon as possible.  Submit refill requests through [employer] or call your pharmacy and they will forward the refill request to us. Please allow 3 business days for your refill to be completed.          Additional Information About Your Visit        MyChart Information     [employer] gives you secure access to your electronic health record. If you see a primary care provider, you can also send messages to your care team and make appointments. If you have questions, please call your primary care clinic.  If you do not have a primary care provider, please call [phone] and they will assist you.        Care EveryWhere ID     This is your Care EveryWhere ID. This could be used by other organizations to access  your [place] medical records  [id number]        Your Vitals Were     Last Period                   01/06/2015            Blood Pressure from Last 3 Encounters:   12/29/17 108/66   12/09/17 120/61   11/27/17 100/72    Weight from Last 3 Encounters:   12/29/17 221 lb 8 oz (100.5 kg)   12/09/17 225 lb (102.1 kg)   11/27/17 231 lb (104.8 kg)              Today, you had the following     No orders found for display         Today's Medication Changes          These changes are accurate as of: 12/29/17  2:52 PM.  If you have any questions, ask your nurse or doctor.               Start taking these medicines.        Dose/Directions    prochlorperazine 5 MG tablet   Commonly known as:  COMPAZINE   Used for:  Nausea   Started by:  Rowena Haas APRN CNP        Dose:  5 mg   Take 1 tablet (5 mg) by mouth every 6 hours as needed for nausea or vomiting   Quantity:  90 tablet   Refills:  0         These medicines have changed or have updated prescriptions.        Dose/Directions    insulin aspart 100 UNIT/ML injection   Commonly known as:  NovoLOG FLEXPEN   This may have changed:  how much to take   Used for:  Type 2 diabetes mellitus with mild nonproliferative retinopathy without macular edema, with long-term current use of insulin, unspecified laterality (H)   Changed by:  Rowena Haas APRN CNP        Dose:  20 Units   Inject 20 Units Subcutaneous 3 times daily (with meals) Once daily, can add additional 5 units if BGs are >500mg/dL.   Quantity:  15 mL   Refills:  11       insulin glargine 100 UNIT/ML injection   Commonly known as:  LANTUS   This may have changed:  how much to take   Used for:  Type 2 diabetes mellitus with mild nonproliferative retinopathy without macular edema, with long-term current use of insulin, unspecified laterality (H)   Changed by:  Rowena Haas APRN CNP        Dose:  35 Units   Inject 35 Units Subcutaneous At Bedtime   Quantity:  3 mL   Refills:  11            Where to get your  medicines      These medications were sent to [facility] [address]  [address] [facility] [address]     Phone:  [phone]     insulin aspart 100 UNIT/ML injection    insulin glargine 100 UNIT/ML injection    prochlorperazine 5 MG tablet                Primary Care Provider Office Phone # Fax #    ART Arias [phone]       [address] [facility] [address]  [facility] [address]        Goals        General    Medical (pt-stated)     Notes - Note created  7/7/2016  9:15 AM by Chelsea Pulido RN    Get blood sugars under control    Improve medication compliance    As of today's date 7/7/2016 goal is met at 0 - 25%.   Goal Status:  Active          Equal Access to Services     RAMESH GARRIDO : Hadii aad svitlana hadasho Someliaali, waaxda luqadaha, qaybta kaalmada adeegyada, lorena ellison . So [facility] [phone].    ATENCIÓN: Si habla español, tiene a trinh disposición servicios gratuitos de asistencia lingüística. Smith al [phone].    We comply with applicable federal civil rights laws and [place] laws. We do not discriminate on the basis of race, color, national origin, age, disability, sex, sexual orientation, or gender identity.            Thank you!     Thank you for choosing [facility]  for your care. Our goal is always to provide you with excellent care. Hearing back from our patients is one way we can continue to improve our services. Please take a few minutes to complete the written survey that you may receive in the mail after your visit with us. Thank you!             Your Updated Medication List - Protect others around you: Learn how to safely use, store and throw away your medicines at www.disposemymeds.org.          This list is accurate as of: 12/29/17  2:52 PM.  Always use your most recent med list.                   Brand Name Dispense Instructions for use  Diagnosis    ACETAMINOPHEN PO      Take 1,000 mg by mouth every 6 hours as needed for pain or fever        albuterol 108 (90 BASE) MCG/ACT Inhaler    PROAIR HFA/PROVENTIL HFA/VENTOLIN HFA    3 Inhaler    Inhale 2 puffs into the lungs every 6 hours as needed for shortness of breath / dyspnea or wheezing    Dyspnea on exertion       aspirin 81 MG EC tablet    ASPIRIN LOW DOSE    100 tablet    Take 1 tablet (81 mg) by mouth daily    Coronary artery disease involving native heart with angina pectoris, unspecified vessel or lesion type (H)       atorvastatin 80 MG tablet    LIPITOR    28 tablet    TAKE 1 TABLET (80MG) BY MOUTH DAILY    Hyperlipidemia LDL goal <100       blood glucose monitoring lancets     150 each    Use to test blood sugar 2 times daily or as directed.  Ok to substitute alternative if insurance prefers.    Type 2 diabetes mellitus with diabetic neuropathy, with long-term current use of insulin (H)       blood glucose monitoring meter device kit     1 kit    Use to test blood sugar 2 times daily or as directed.  Ok to substitute alternative if insurance prefers.    Type 2 diabetes mellitus with diabetic neuropathy, with long-term current use of insulin (H)       chlorthalidone 25 MG tablet    HYGROTON    30 tablet    Take 1 tablet (25 mg) by mouth daily    HTN, goal below 140/90       gabapentin 300 MG capsule    NEURONTIN    168 capsule    TAKE 2 CAPSULES (600MG) BY MOUTH THREE TIMES A DAY    Type 2 diabetes mellitus with diabetic neuropathy, with long-term current use of insulin (H)       glucose 4 G Chew chewable tablet     20 tablet    Take 2 every 15 minutes for blood sugar <70mg/dL. Recheck blood sugar every 15 minutes until above 70mg/dL, then eat a substantial meal.    Type 2 diabetes mellitus with mild nonproliferative retinopathy without macular edema, with long-term current use of insulin, unspecified laterality (H)       haloperidol 5 MG tablet    HALDOL    30 tablet    Take 1 tablet (5 mg)  by mouth every 12 hours as needed for agitation    Schizoaffective disorder, depressive type (H)       ibuprofen 600 MG tablet    ADVIL/MOTRIN    60 tablet    Take 1 tablet (600 mg) by mouth every 4 hours as needed for moderate pain    Closed fracture of one rib of right side, initial encounter       insulin aspart 100 UNIT/ML injection    NovoLOG FLEXPEN    15 mL    Inject 20 Units Subcutaneous 3 times daily (with meals) Once daily, can add additional 5 units if BGs are >500mg/dL.    Type 2 diabetes mellitus with mild nonproliferative retinopathy without macular edema, with long-term current use of insulin, unspecified laterality (H)       insulin glargine 100 UNIT/ML injection    LANTUS    3 mL    Inject 35 Units Subcutaneous At Bedtime    Type 2 diabetes mellitus with mild nonproliferative retinopathy without macular edema, with long-term current use of insulin, unspecified laterality (H)       losartan 100 MG tablet    COZAAR    28 tablet    TAKE 1 TABLET (100MG) BY MOUTH DAILY    Coronary artery disease involving native heart with angina pectoris, unspecified vessel or lesion type (H)       melatonin 5 MG Caps     90 capsule    Take 1 capsule by mouth daily At dinnertime    Insomnia, unspecified type       metoprolol 100 MG 24 hr tablet    TOPROL-XL    28 tablet    TAKE 1 TABLET (100MG) BY MOUTH DAILY    Coronary artery disease involving native heart with angina pectoris, unspecified vessel or lesion type (H)       * ONETOUCH ULTRA test strip   Generic drug:  blood glucose monitoring     150 strip    TEST TWICE DAILY AS DIRECTED    Type 2 diabetes mellitus with diabetic neuropathy, with long-term current use of insulin (H)       * blood glucose monitoring test strip    ONETOUCH VERIO IQ    150 strip    Use to test blood sugar 2 times daily or as directed.  Ok to substitute alternative if insurance prefers.    Type 2 diabetes mellitus with diabetic neuropathy, with long-term current use of insulin (H)       *  order for DME     1 Device    Equipment being ordered: Rolling walker    Falls frequently       * order for DME     1 each    Hold Novolog if meals are refused.    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, with long-term current use of insulin, unspecified laterality (H)       polyethylene glycol powder    MIRALAX/GLYCOLAX    527 g    TAKE 17 GRAMS (1 CAPFUL) BY MOUTH DAILY    Constipation, unspecified constipation type       prochlorperazine 5 MG tablet    COMPAZINE    90 tablet    Take 1 tablet (5 mg) by mouth every 6 hours as needed for nausea or vomiting    Nausea       ranitidine 300 MG tablet    ZANTAC    90 tablet    TAKE 1 TABLET (300MG) BY MOUTH AT BEDTIME    Gastroesophageal reflux disease without esophagitis       senna-docusate 8.6-50 MG per tablet    SENOKOT-S;PERICOLACE     Take 1 tablet by mouth 2 times daily as needed for constipation        SERTRALINE HCL PO      Take 50 mg by mouth daily        TRULICITY 1.5 MG/0.5ML pen   Generic drug:  dulaglutide     2 mL    INJECT 1.5MG SUBCUTANEOUSLY EVERY SEVEN DAYS    Type 2 diabetes mellitus with mild nonproliferative retinopathy without macular edema, with long-term current use of insulin, unspecified laterality (H)       ULTICARE MINI 31G X 6 MM   Generic drug:  insulin pen needle     100 each    USE 4 DAILY OR AS DIRECTED    Type 2 diabetes mellitus with mild nonproliferative retinopathy without macular edema, with long-term current use of insulin, unspecified laterality (H)       vitamin D3 70478 UNITS capsule    CHOLECALCIFEROL    4 capsule    TAKE 1 CAPSULE (50,000 UNITS) BY MOUTH ONCE A WEEK    Vitamin D deficiency       * Notice:  This list has 5 medication(s) that are the same as other medications  prescribed for you. Read the directions carefully, and ask your doctor or other care provider to review them with you.

## 2018-01-02 ENCOUNTER — MEDICAL CORRESPONDENCE (OUTPATIENT)
Dept: HEALTH INFORMATION MANAGEMENT | Facility: CLINIC | Age: 58
End: 2018-01-02

## 2018-01-02 ENCOUNTER — TELEPHONE (OUTPATIENT)
Dept: FAMILY MEDICINE | Facility: CLINIC | Age: 58
End: 2018-01-02

## 2018-01-02 DIAGNOSIS — E11.3299 TYPE 2 DIABETES MELLITUS WITH MILD NONPROLIFERATIVE RETINOPATHY WITHOUT MACULAR EDEMA, WITH LONG-TERM CURRENT USE OF INSULIN, UNSPECIFIED LATERALITY (H): ICD-10-CM

## 2018-01-02 DIAGNOSIS — Z79.4 TYPE 2 DIABETES MELLITUS WITH MILD NONPROLIFERATIVE RETINOPATHY WITHOUT MACULAR EDEMA, WITH LONG-TERM CURRENT USE OF INSULIN, UNSPECIFIED LATERALITY (H): ICD-10-CM

## 2018-01-02 NOTE — PROGRESS NOTES
Increased Lantus Dose to 45 units daily. Will follow-up at the next appointment.   ART Oliver CNP'

## 2018-01-02 NOTE — TELEPHONE ENCOUNTER
Reason for Call:  Lantus     Detailed comments: Alva from group home ([facility]) will need new Lantus orders fax over to the group home. Also any changes to pt's med list will need to be fax over to the group home.   Fax # [phone]     Phone Number Patient can be reached at: Other phone number: Alva # [phone]    Best Time: anytime    Can we leave a detailed message on this number? Not Applicable    Call taken on 1/2/2018 at 12:25 PM by Giorgi Case

## 2018-01-02 NOTE — TELEPHONE ENCOUNTER
Pt called and notified.  Pt verbalized understanding and had no further questions or concerns    Indra Le RN

## 2018-01-02 NOTE — TELEPHONE ENCOUNTER
Please call patient and notify that Fax was received with blood sugar results. Increase Lantus to 45 units daily.   Follow-up at the next appointment for further medication changes.   ART Oliver CNP

## 2018-01-12 DIAGNOSIS — Z79.4 TYPE 2 DIABETES MELLITUS WITH DIABETIC NEUROPATHY, WITH LONG-TERM CURRENT USE OF INSULIN (H): ICD-10-CM

## 2018-01-12 DIAGNOSIS — E11.40 TYPE 2 DIABETES MELLITUS WITH DIABETIC NEUROPATHY, WITH LONG-TERM CURRENT USE OF INSULIN (H): ICD-10-CM

## 2018-01-12 NOTE — TELEPHONE ENCOUNTER
Requested Prescriptions   Pending Prescriptions Disp Refills     ONETOUCH ULTRA test strip [Pharmacy Med Name: ONE TOUCH ULTRA BLUE TEST STR 25'S]  Last Written Prescription Date:  09/27/17  Last Fill Quantity: 150,  # refills: 11   Last Office Visit with G, P or [facility] prescribing provider:  12/29/17   Future Office Visit:    Next 5 appointments (look out 90 days)     Jan 23, 2018  2:00 PM CST   Return Visit with ART Arias CNP   [facility] Primary [facility] ([facility])    [address] [place] [address]  [address] [place] [id number] [phone]            Jan 23, 2018  2:00 PM CST   Return Visit with Richardson Lopez [facility] ([facility])    [address] [place] [address]  [address] [place] [id number] [phone]            Jan 23, 2018  2:00 PM CST   SHORT with Eugenia De Jesus [facility] Primary Care [facility] ([facility])    [address] [place] [address]  [address] [place] [id number] [phone]                175 strip 1     Sig: TEST TWICE DAILY    Diabetic Supplies Protocol Passed    1/12/2018 12:43 PM       Passed - Patient is 18 years of age or older       Passed - Patient has had appt within past 6 mos    IV to PO - Antibiotics     None

## 2018-01-13 ENCOUNTER — APPOINTMENT (OUTPATIENT)
Dept: CT IMAGING | Facility: CLINIC | Age: 58
End: 2018-01-13
Attending: EMERGENCY MEDICINE
Payer: MEDICARE

## 2018-01-13 ENCOUNTER — NURSE TRIAGE (OUTPATIENT)
Dept: NURSING | Facility: CLINIC | Age: 58
End: 2018-01-13

## 2018-01-13 ENCOUNTER — HOSPITAL ENCOUNTER (EMERGENCY)
Facility: CLINIC | Age: 58
Discharge: PSYCHIATRIC OR SUBSTANCE USE FACILITY | End: 2018-01-14
Attending: EMERGENCY MEDICINE | Admitting: EMERGENCY MEDICINE
Payer: MEDICARE

## 2018-01-13 DIAGNOSIS — R42 VERTIGO: ICD-10-CM

## 2018-01-13 DIAGNOSIS — N17.9 AKI (ACUTE KIDNEY INJURY) (H): ICD-10-CM

## 2018-01-13 DIAGNOSIS — R45.851 SUICIDAL IDEATION: ICD-10-CM

## 2018-01-13 LAB
ALBUMIN SERPL-MCNC: 3.8 G/DL (ref 3.4–5)
ALBUMIN UR-MCNC: 30 MG/DL
ALP SERPL-CCNC: 115 U/L (ref 40–150)
ALT SERPL W P-5'-P-CCNC: 29 U/L (ref 0–50)
ANION GAP SERPL CALCULATED.3IONS-SCNC: 8 MMOL/L (ref 3–14)
APPEARANCE UR: ABNORMAL
AST SERPL W P-5'-P-CCNC: 15 U/L (ref 0–45)
BACTERIA #/AREA URNS HPF: ABNORMAL /HPF
BASOPHILS # BLD AUTO: 0 10E9/L (ref 0–0.2)
BASOPHILS NFR BLD AUTO: 0.2 %
BILIRUB SERPL-MCNC: 0.2 MG/DL (ref 0.2–1.3)
BILIRUB UR QL STRIP: ABNORMAL
BUN SERPL-MCNC: 27 MG/DL (ref 7–30)
CALCIUM SERPL-MCNC: 8.8 MG/DL (ref 8.5–10.1)
CHLORIDE SERPL-SCNC: 103 MMOL/L (ref 94–109)
CO2 SERPL-SCNC: 26 MMOL/L (ref 20–32)
COLOR UR AUTO: YELLOW
CREAT SERPL-MCNC: 1.62 MG/DL (ref 0.52–1.04)
DIFFERENTIAL METHOD BLD: ABNORMAL
EOSINOPHIL # BLD AUTO: 0.1 10E9/L (ref 0–0.7)
EOSINOPHIL NFR BLD AUTO: 0.9 %
ERYTHROCYTE [DISTWIDTH] IN BLOOD BY AUTOMATED COUNT: 13.2 % (ref 10–15)
GFR SERPL CREATININE-BSD FRML MDRD: 33 ML/MIN/1.7M2
GLUCOSE SERPL-MCNC: 154 MG/DL (ref 70–99)
GLUCOSE UR STRIP-MCNC: NEGATIVE MG/DL
HCT VFR BLD AUTO: 32.2 % (ref 35–47)
HGB BLD-MCNC: 10.6 G/DL (ref 11.7–15.7)
HGB UR QL STRIP: NEGATIVE
HYALINE CASTS #/AREA URNS LPF: 62 /LPF (ref 0–2)
IMM GRANULOCYTES # BLD: 0 10E9/L (ref 0–0.4)
IMM GRANULOCYTES NFR BLD: 0.4 %
KETONES UR STRIP-MCNC: NEGATIVE MG/DL
LEUKOCYTE ESTERASE UR QL STRIP: ABNORMAL
LYMPHOCYTES # BLD AUTO: 2.8 10E9/L (ref 0.8–5.3)
LYMPHOCYTES NFR BLD AUTO: 29.9 %
MCH RBC QN AUTO: 31.5 PG (ref 26.5–33)
MCHC RBC AUTO-ENTMCNC: 32.9 G/DL (ref 31.5–36.5)
MCV RBC AUTO: 96 FL (ref 78–100)
MONOCYTES # BLD AUTO: 0.8 10E9/L (ref 0–1.3)
MONOCYTES NFR BLD AUTO: 8.7 %
MUCOUS THREADS #/AREA URNS LPF: PRESENT /LPF
NEUTROPHILS # BLD AUTO: 5.5 10E9/L (ref 1.6–8.3)
NEUTROPHILS NFR BLD AUTO: 59.9 %
NITRATE UR QL: NEGATIVE
NRBC # BLD AUTO: 0 10*3/UL
NRBC BLD AUTO-RTO: 0 /100
PH UR STRIP: 5 PH (ref 5–7)
PLATELET # BLD AUTO: 225 10E9/L (ref 150–450)
POTASSIUM SERPL-SCNC: 4 MMOL/L (ref 3.4–5.3)
PROT SERPL-MCNC: 8.2 G/DL (ref 6.8–8.8)
RBC # BLD AUTO: 3.37 10E12/L (ref 3.8–5.2)
RBC #/AREA URNS AUTO: 3 /HPF (ref 0–2)
SODIUM SERPL-SCNC: 137 MMOL/L (ref 133–144)
SOURCE: ABNORMAL
SP GR UR STRIP: 1.02 (ref 1–1.03)
SQUAMOUS #/AREA URNS AUTO: 21 /HPF (ref 0–1)
TRANS CELLS #/AREA URNS HPF: 2 /HPF (ref 0–1)
TROPONIN I SERPL-MCNC: <0.015 UG/L (ref 0–0.04)
UROBILINOGEN UR STRIP-MCNC: 2 MG/DL (ref 0–2)
WBC # BLD AUTO: 9.3 10E9/L (ref 4–11)
WBC #/AREA URNS AUTO: 91 /HPF (ref 0–2)

## 2018-01-13 PROCEDURE — 93005 ELECTROCARDIOGRAM TRACING: CPT

## 2018-01-13 PROCEDURE — 70450 CT HEAD/BRAIN W/O DYE: CPT

## 2018-01-13 PROCEDURE — A9270 NON-COVERED ITEM OR SERVICE: HCPCS | Mod: GY | Performed by: EMERGENCY MEDICINE

## 2018-01-13 PROCEDURE — 81001 URINALYSIS AUTO W/SCOPE: CPT | Performed by: EMERGENCY MEDICINE

## 2018-01-13 PROCEDURE — 25000132 ZZH RX MED GY IP 250 OP 250 PS 637: Mod: GY | Performed by: EMERGENCY MEDICINE

## 2018-01-13 PROCEDURE — 96361 HYDRATE IV INFUSION ADD-ON: CPT

## 2018-01-13 PROCEDURE — 80053 COMPREHEN METABOLIC PANEL: CPT | Performed by: EMERGENCY MEDICINE

## 2018-01-13 PROCEDURE — 96374 THER/PROPH/DIAG INJ IV PUSH: CPT

## 2018-01-13 PROCEDURE — 80307 DRUG TEST PRSMV CHEM ANLYZR: CPT | Performed by: EMERGENCY MEDICINE

## 2018-01-13 PROCEDURE — 85025 COMPLETE CBC W/AUTO DIFF WBC: CPT | Performed by: EMERGENCY MEDICINE

## 2018-01-13 PROCEDURE — 84484 ASSAY OF TROPONIN QUANT: CPT | Performed by: EMERGENCY MEDICINE

## 2018-01-13 PROCEDURE — 99285 EMERGENCY DEPT VISIT HI MDM: CPT | Mod: 25

## 2018-01-13 PROCEDURE — 90791 PSYCH DIAGNOSTIC EVALUATION: CPT

## 2018-01-13 PROCEDURE — 25000128 H RX IP 250 OP 636: Performed by: EMERGENCY MEDICINE

## 2018-01-13 PROCEDURE — 80307 DRUG TEST PRSMV CHEM ANLYZR: CPT | Mod: 59 | Performed by: EMERGENCY MEDICINE

## 2018-01-13 PROCEDURE — 80320 DRUG SCREEN QUANTALCOHOLS: CPT | Performed by: EMERGENCY MEDICINE

## 2018-01-13 PROCEDURE — 25000131 ZZH RX MED GY IP 250 OP 636 PS 637: Mod: GY | Performed by: EMERGENCY MEDICINE

## 2018-01-13 RX ORDER — MECLIZINE HYDROCHLORIDE 25 MG/1
25 TABLET ORAL 3 TIMES DAILY PRN
Status: DISCONTINUED | OUTPATIENT
Start: 2018-01-13 | End: 2018-01-14 | Stop reason: HOSPADM

## 2018-01-13 RX ORDER — SODIUM CHLORIDE 9 MG/ML
1000 INJECTION, SOLUTION INTRAVENOUS CONTINUOUS
Status: DISCONTINUED | OUTPATIENT
Start: 2018-01-13 | End: 2018-01-14 | Stop reason: HOSPADM

## 2018-01-13 RX ORDER — ONDANSETRON 2 MG/ML
4 INJECTION INTRAMUSCULAR; INTRAVENOUS EVERY 30 MIN PRN
Status: DISCONTINUED | OUTPATIENT
Start: 2018-01-13 | End: 2018-01-14 | Stop reason: HOSPADM

## 2018-01-13 RX ORDER — DIAZEPAM 5 MG
5 TABLET ORAL ONCE
Status: COMPLETED | OUTPATIENT
Start: 2018-01-13 | End: 2018-01-13

## 2018-01-13 RX ADMIN — ONDANSETRON 4 MG: 2 INJECTION INTRAMUSCULAR; INTRAVENOUS at 23:27

## 2018-01-13 RX ADMIN — SODIUM CHLORIDE 1000 ML: 9 INJECTION, SOLUTION INTRAVENOUS at 23:26

## 2018-01-13 RX ADMIN — DIAZEPAM 5 MG: 5 TABLET ORAL at 23:28

## 2018-01-13 RX ADMIN — MECLIZINE HYDROCHLORIDE 25 MG: 25 TABLET ORAL at 23:28

## 2018-01-14 ENCOUNTER — HOSPITAL ENCOUNTER (INPATIENT)
Facility: CLINIC | Age: 58
LOS: 5 days | Discharge: NURSING, REHAB OR LONG-TERM CARE | DRG: 885 | End: 2018-01-19
Attending: PSYCHIATRY & NEUROLOGY | Admitting: PSYCHIATRY & NEUROLOGY
Payer: MEDICARE

## 2018-01-14 VITALS
HEART RATE: 72 BPM | SYSTOLIC BLOOD PRESSURE: 109 MMHG | DIASTOLIC BLOOD PRESSURE: 61 MMHG | RESPIRATION RATE: 13 BRPM | TEMPERATURE: 98.6 F | OXYGEN SATURATION: 96 %

## 2018-01-14 DIAGNOSIS — G24.01 TARDIVE DYSKINESIA: ICD-10-CM

## 2018-01-14 DIAGNOSIS — F25.1 SCHIZOAFFECTIVE DISORDER, DEPRESSIVE TYPE (H): Primary | ICD-10-CM

## 2018-01-14 LAB
ALBUMIN UR-MCNC: NEGATIVE MG/DL
AMPHETAMINES UR QL SCN: NEGATIVE
APPEARANCE UR: CLEAR
BACTERIA #/AREA URNS HPF: ABNORMAL /HPF
BARBITURATES UR QL: NEGATIVE
BENZODIAZ UR QL: NEGATIVE
BILIRUB UR QL STRIP: NEGATIVE
CANNABINOIDS UR QL SCN: NEGATIVE
COCAINE UR QL: NEGATIVE
COLOR UR AUTO: YELLOW
CREAT SERPL-MCNC: 1.11 MG/DL (ref 0.52–1.04)
ETHANOL SERPL-MCNC: <0.01 G/DL
GFR SERPL CREATININE-BSD FRML MDRD: 51 ML/MIN/1.7M2
GLUCOSE BLDC GLUCOMTR-MCNC: 134 MG/DL (ref 70–99)
GLUCOSE BLDC GLUCOMTR-MCNC: 137 MG/DL (ref 70–99)
GLUCOSE BLDC GLUCOMTR-MCNC: 141 MG/DL (ref 70–99)
GLUCOSE BLDC GLUCOMTR-MCNC: 165 MG/DL (ref 70–99)
GLUCOSE BLDC GLUCOMTR-MCNC: 179 MG/DL (ref 70–99)
GLUCOSE UR STRIP-MCNC: 50 MG/DL
HGB UR QL STRIP: NEGATIVE
HYALINE CASTS #/AREA URNS LPF: 4 /LPF (ref 0–2)
INTERPRETATION ECG - MUSE: NORMAL
KETONES UR STRIP-MCNC: NEGATIVE MG/DL
LEUKOCYTE ESTERASE UR QL STRIP: NEGATIVE
MUCOUS THREADS #/AREA URNS LPF: PRESENT /LPF
NITRATE UR QL: NEGATIVE
OPIATES UR QL SCN: NEGATIVE
PCP UR QL SCN: NEGATIVE
PH UR STRIP: 5 PH (ref 5–7)
RBC #/AREA URNS AUTO: 1 /HPF (ref 0–2)
SOURCE: ABNORMAL
SP GR UR STRIP: 1.02 (ref 1–1.03)
SQUAMOUS #/AREA URNS AUTO: 1 /HPF (ref 0–1)
UROBILINOGEN UR STRIP-MCNC: 0 MG/DL (ref 0–2)
WBC #/AREA URNS AUTO: <1 /HPF (ref 0–2)

## 2018-01-14 PROCEDURE — 82565 ASSAY OF CREATININE: CPT | Performed by: PHYSICIAN ASSISTANT

## 2018-01-14 PROCEDURE — 25000132 ZZH RX MED GY IP 250 OP 250 PS 637: Mod: GY | Performed by: PSYCHIATRY & NEUROLOGY

## 2018-01-14 PROCEDURE — A9270 NON-COVERED ITEM OR SERVICE: HCPCS | Mod: GY | Performed by: PSYCHIATRY & NEUROLOGY

## 2018-01-14 PROCEDURE — 99221 1ST HOSP IP/OBS SF/LOW 40: CPT | Mod: AI | Performed by: PSYCHIATRY & NEUROLOGY

## 2018-01-14 PROCEDURE — 81001 URINALYSIS AUTO W/SCOPE: CPT | Performed by: EMERGENCY MEDICINE

## 2018-01-14 PROCEDURE — 99222 1ST HOSP IP/OBS MODERATE 55: CPT | Performed by: PHYSICIAN ASSISTANT

## 2018-01-14 PROCEDURE — 36415 COLL VENOUS BLD VENIPUNCTURE: CPT | Performed by: PHYSICIAN ASSISTANT

## 2018-01-14 PROCEDURE — 00000146 ZZHCL STATISTIC GLUCOSE BY METER IP

## 2018-01-14 PROCEDURE — 99207 ZZC CONSULT E&M CHANGED TO INITIAL LEVEL: CPT | Performed by: PHYSICIAN ASSISTANT

## 2018-01-14 PROCEDURE — 99207 ZZC DOWN CODE DUE TO INITIAL EXAM: CPT | Performed by: PSYCHIATRY & NEUROLOGY

## 2018-01-14 PROCEDURE — 12400007 ZZH R&B MH INTERMEDIATE UMMC

## 2018-01-14 PROCEDURE — 25000131 ZZH RX MED GY IP 250 OP 636 PS 637: Mod: GY | Performed by: PSYCHIATRY & NEUROLOGY

## 2018-01-14 RX ORDER — GABAPENTIN 300 MG/1
600 CAPSULE ORAL 3 TIMES DAILY
Status: DISCONTINUED | OUTPATIENT
Start: 2018-01-14 | End: 2018-01-19 | Stop reason: HOSPADM

## 2018-01-14 RX ORDER — ALBUTEROL SULFATE 90 UG/1
2 AEROSOL, METERED RESPIRATORY (INHALATION) EVERY 6 HOURS PRN
Status: DISCONTINUED | OUTPATIENT
Start: 2018-01-14 | End: 2018-01-19 | Stop reason: HOSPADM

## 2018-01-14 RX ORDER — AMANTADINE HYDROCHLORIDE 100 MG/1
100 CAPSULE, GELATIN COATED ORAL 2 TIMES DAILY
Status: DISCONTINUED | OUTPATIENT
Start: 2018-01-14 | End: 2018-01-19 | Stop reason: HOSPADM

## 2018-01-14 RX ORDER — ATORVASTATIN CALCIUM 80 MG/1
80 TABLET, FILM COATED ORAL DAILY
Status: DISCONTINUED | OUTPATIENT
Start: 2018-01-14 | End: 2018-01-19 | Stop reason: HOSPADM

## 2018-01-14 RX ORDER — HALOPERIDOL 5 MG/1
5 TABLET ORAL EVERY 12 HOURS PRN
Status: DISCONTINUED | OUTPATIENT
Start: 2018-01-14 | End: 2018-01-19 | Stop reason: HOSPADM

## 2018-01-14 RX ORDER — DEXTROSE MONOHYDRATE 25 G/50ML
25-50 INJECTION, SOLUTION INTRAVENOUS
Status: DISCONTINUED | OUTPATIENT
Start: 2018-01-14 | End: 2018-01-19 | Stop reason: HOSPADM

## 2018-01-14 RX ORDER — CHLORTHALIDONE 25 MG/1
25 TABLET ORAL DAILY
Status: DISCONTINUED | OUTPATIENT
Start: 2018-01-14 | End: 2018-01-14

## 2018-01-14 RX ORDER — CHOLECALCIFEROL (VITAMIN D3) 1250 MCG
50000 CAPSULE ORAL
Status: DISCONTINUED | OUTPATIENT
Start: 2018-01-16 | End: 2018-01-19 | Stop reason: HOSPADM

## 2018-01-14 RX ORDER — MECLIZINE HCL 12.5 MG 12.5 MG/1
12.5 TABLET ORAL 3 TIMES DAILY PRN
Status: DISCONTINUED | OUTPATIENT
Start: 2018-01-14 | End: 2018-01-19 | Stop reason: HOSPADM

## 2018-01-14 RX ORDER — ONDANSETRON 4 MG/1
4-8 TABLET, FILM COATED ORAL EVERY 6 HOURS PRN
Status: DISCONTINUED | OUTPATIENT
Start: 2018-01-14 | End: 2018-01-19 | Stop reason: HOSPADM

## 2018-01-14 RX ORDER — HYDROXYZINE HYDROCHLORIDE 25 MG/1
25-50 TABLET, FILM COATED ORAL EVERY 4 HOURS PRN
Status: DISCONTINUED | OUTPATIENT
Start: 2018-01-14 | End: 2018-01-19 | Stop reason: HOSPADM

## 2018-01-14 RX ORDER — ASPIRIN 81 MG/1
81 TABLET ORAL DAILY
Status: DISCONTINUED | OUTPATIENT
Start: 2018-01-14 | End: 2018-01-19 | Stop reason: HOSPADM

## 2018-01-14 RX ORDER — HALOPERIDOL 5 MG/1
5 TABLET ORAL 2 TIMES DAILY
Status: DISCONTINUED | OUTPATIENT
Start: 2018-01-14 | End: 2018-01-19 | Stop reason: HOSPADM

## 2018-01-14 RX ORDER — METOPROLOL SUCCINATE 100 MG/1
100 TABLET, EXTENDED RELEASE ORAL DAILY
Status: DISCONTINUED | OUTPATIENT
Start: 2018-01-14 | End: 2018-01-19 | Stop reason: HOSPADM

## 2018-01-14 RX ORDER — CHOLECALCIFEROL (VITAMIN D3) 1250 MCG
50000 CAPSULE ORAL
Status: DISCONTINUED | OUTPATIENT
Start: 2018-01-14 | End: 2018-01-14

## 2018-01-14 RX ORDER — METOPROLOL SUCCINATE 100 MG/1
100 TABLET, EXTENDED RELEASE ORAL DAILY
Status: DISCONTINUED | OUTPATIENT
Start: 2018-01-14 | End: 2018-01-14

## 2018-01-14 RX ORDER — LOSARTAN POTASSIUM 50 MG/1
100 TABLET ORAL DAILY
Status: DISCONTINUED | OUTPATIENT
Start: 2018-01-15 | End: 2018-01-19 | Stop reason: HOSPADM

## 2018-01-14 RX ORDER — DIPHENHYDRAMINE HCL 25 MG
25 CAPSULE ORAL EVERY 6 HOURS PRN
Status: DISCONTINUED | OUTPATIENT
Start: 2018-01-14 | End: 2018-01-19 | Stop reason: HOSPADM

## 2018-01-14 RX ORDER — AMOXICILLIN 250 MG
1 CAPSULE ORAL 2 TIMES DAILY PRN
Status: DISCONTINUED | OUTPATIENT
Start: 2018-01-14 | End: 2018-01-19 | Stop reason: HOSPADM

## 2018-01-14 RX ORDER — ACETAMINOPHEN 500 MG
1000 TABLET ORAL EVERY 6 HOURS PRN
Status: DISCONTINUED | OUTPATIENT
Start: 2018-01-14 | End: 2018-01-19 | Stop reason: HOSPADM

## 2018-01-14 RX ORDER — CLOTRIMAZOLE 1 %
CREAM (GRAM) TOPICAL 2 TIMES DAILY
COMMUNITY
End: 2018-10-17

## 2018-01-14 RX ORDER — NICOTINE POLACRILEX 4 MG
15-30 LOZENGE BUCCAL
Status: DISCONTINUED | OUTPATIENT
Start: 2018-01-14 | End: 2018-01-19 | Stop reason: HOSPADM

## 2018-01-14 RX ORDER — IBUPROFEN 600 MG/1
600 TABLET, FILM COATED ORAL EVERY 4 HOURS PRN
Status: DISCONTINUED | OUTPATIENT
Start: 2018-01-14 | End: 2018-01-14

## 2018-01-14 RX ORDER — LOSARTAN POTASSIUM 50 MG/1
100 TABLET ORAL DAILY
Status: DISCONTINUED | OUTPATIENT
Start: 2018-01-14 | End: 2018-01-14

## 2018-01-14 RX ORDER — CLOTRIMAZOLE 1 %
CREAM (GRAM) TOPICAL 2 TIMES DAILY
Status: DISCONTINUED | OUTPATIENT
Start: 2018-01-14 | End: 2018-01-19 | Stop reason: HOSPADM

## 2018-01-14 RX ORDER — POLYETHYLENE GLYCOL 3350 17 G/17G
17 POWDER, FOR SOLUTION ORAL DAILY
Status: DISCONTINUED | OUTPATIENT
Start: 2018-01-14 | End: 2018-01-19 | Stop reason: HOSPADM

## 2018-01-14 RX ORDER — CHLORTHALIDONE 25 MG/1
25 TABLET ORAL DAILY
Status: DISCONTINUED | OUTPATIENT
Start: 2018-01-15 | End: 2018-01-18

## 2018-01-14 RX ADMIN — LOSARTAN POTASSIUM 100 MG: 50 TABLET ORAL at 09:21

## 2018-01-14 RX ADMIN — HALOPERIDOL 5 MG: 5 TABLET ORAL at 21:49

## 2018-01-14 RX ADMIN — ASPIRIN 81 MG: 81 TABLET, COATED ORAL at 09:21

## 2018-01-14 RX ADMIN — INSULIN GLARGINE 45 UNITS: 100 INJECTION, SOLUTION SUBCUTANEOUS at 21:48

## 2018-01-14 RX ADMIN — SERTRALINE HYDROCHLORIDE 50 MG: 50 TABLET ORAL at 09:22

## 2018-01-14 RX ADMIN — GABAPENTIN 600 MG: 300 CAPSULE ORAL at 14:12

## 2018-01-14 RX ADMIN — HALOPERIDOL 5 MG: 5 TABLET ORAL at 09:21

## 2018-01-14 RX ADMIN — GABAPENTIN 600 MG: 300 CAPSULE ORAL at 21:48

## 2018-01-14 RX ADMIN — ATORVASTATIN CALCIUM 80 MG: 80 TABLET, FILM COATED ORAL at 09:20

## 2018-01-14 RX ADMIN — POLYETHYLENE GLYCOL 3350 17 G: 17 POWDER, FOR SOLUTION ORAL at 09:22

## 2018-01-14 RX ADMIN — AMANTADINE HYDROCHLORIDE 100 MG: 100 CAPSULE ORAL at 21:49

## 2018-01-14 RX ADMIN — INSULIN ASPART 20 UNITS: 100 INJECTION, SOLUTION INTRAVENOUS; SUBCUTANEOUS at 18:10

## 2018-01-14 RX ADMIN — INSULIN ASPART 20 UNITS: 100 INJECTION, SOLUTION INTRAVENOUS; SUBCUTANEOUS at 12:43

## 2018-01-14 RX ADMIN — GABAPENTIN 600 MG: 300 CAPSULE ORAL at 09:21

## 2018-01-14 RX ADMIN — RANITIDINE 300 MG: 150 TABLET, FILM COATED ORAL at 21:50

## 2018-01-14 RX ADMIN — Medication 5 MG: at 21:50

## 2018-01-14 RX ADMIN — METOPROLOL SUCCINATE 100 MG: 100 TABLET, EXTENDED RELEASE ORAL at 09:20

## 2018-01-14 ASSESSMENT — ENCOUNTER SYMPTOMS
WEAKNESS: 0
BLOOD IN STOOL: 0
HALLUCINATIONS: 1
ANAL BLEEDING: 0
HEADACHES: 1
NUMBNESS: 0
DYSPHORIC MOOD: 1
DIZZINESS: 1
SHORTNESS OF BREATH: 0

## 2018-01-14 ASSESSMENT — ACTIVITIES OF DAILY LIVING (ADL)
RETIRED_COMMUNICATION: 0-->UNDERSTANDS/COMMUNICATES WITHOUT DIFFICULTY
COGNITION: 0 - NO COGNITION ISSUES REPORTED
DRESS: SCRUBS (BEHAVIORAL HEALTH)
SWALLOWING: 0-->SWALLOWS FOODS/LIQUIDS WITHOUT DIFFICULTY
GROOMING: INDEPENDENT;PROMPTS
TOILETING: 0-->INDEPENDENT
RETIRED_EATING: 0-->INDEPENDENT
ORAL_HYGIENE: INDEPENDENT
LAUNDRY: WITH SUPERVISION
AMBULATION: 0-->INDEPENDENT
DRESS: 0-->INDEPENDENT
FALL_HISTORY_WITHIN_LAST_SIX_MONTHS: YES
GROOMING: INDEPENDENT
BATHING: 0-->INDEPENDENT
DRESS: SCRUBS (BEHAVIORAL HEALTH);INDEPENDENT
ORAL_HYGIENE: INDEPENDENT;PROMPTS
TRANSFERRING: 0-->INDEPENDENT

## 2018-01-14 NOTE — PROGRESS NOTES
Continue to encourage fluids due to low BP. Pt still denies dizziness and has been educated to talk to staff if she feels dizzy. Pt is aware that we will checking her BP often.  Pt is cooperative and pleasant and is currently watching football in the [employer].

## 2018-01-14 NOTE — PLAN OF CARE
Problem: General Plan of Care (Inpatient Behavioral)  Goal: Individualization/Patient Specific Goal (IP Behavioral)  The patient and/or their representative will achieve their patient-specific goals related to the plan of care.    The patient-specific goals include:   Illness Management Recovery model: Objectives    Patient will identify reason(s) for hospitalization from their perspective.  Patient will identify a minimum of three goals for discharge.  Patient will identify a minimum of three triggers that may increase their symptoms.  Patient will identify a minimum of three coping skills they can do to stay well.   Patient will identify their support system to demonstrate readiness for discharge.  Outcome: No Change  Illness Management Recovery model: Objectives    Patient will identify reason(s) for hospitalization from their perspective.  Patient will identify a minimum of three goals for discharge.  Patient will identify a minimum of three triggers that may increase their symptoms.  Patient will identify a minimum of three coping skills they can do to stay well.  Patient will identify their support system to demonstrate readiness for discharge.

## 2018-01-14 NOTE — TELEPHONE ENCOUNTER
Group home staff states that pt c/o feeling dizzy for the past 1 hour tonight. When pt got up to walk around she was unsteady. BP 85/64. 15-20 min later BP 92/68. 15-20 min after than /73. Takes  Medication for HTN but no changes in meds or dosages recently. Pt is diabetic and takes insulin.  tonight. Denies breathing difficulty or chest pain/discomfort. No fever or URI symptoms. Advised pt be evaluated at ED. It is difficult to know what is going on and the patient and staff were not the best historians. The unsteadiness when walking due to dizziness indicates this could become a safety concern into the night. Staff said they are going to call 911 (there is only one staff member there for multiple residents). Camille Harrell RN/FNA    Reason for Disposition    SEVERE dizziness (e.g., unable to stand, requires support to walk, feels like passing out now)    Additional Information    Negative: Severe difficulty breathing (e.g., struggling for each breath, speaks in single words)    Negative: [1] Difficulty breathing or swallowing AND [2] started suddenly after medicine, an allergic food or bee sting    Negative: Shock suspected (e.g., cold/pale/clammy skin, too weak to stand, low BP, rapid pulse)    Negative: Difficult to awaken or acting confused  (e.g., disoriented, slurred speech)    Negative: [1] Weakness (i.e., paralysis, loss of muscle strength) of the face, arm or leg on one side of the body AND [2] sudden onset AND [3] present now    Negative: [1] Numbness (i.e., loss of sensation) of the face, arm or leg on one side of the body AND [2] sudden onset AND [3] present now    Negative: [1] Loss of speech or garbled speech AND [2] sudden onset AND [3] present now    Negative: Overdose (accidental or intentional) of medications    Negative: [1] Fainted > 15 minutes ago AND [2] still feels too weak or dizzy to stand    Negative: Heart beating < 50 beats per minute OR > 140 beats per minute    Negative:  "Sounds like a life-threatening emergency to the triager    Negative: Chest pain    Negative: Rectal bleeding, bloody stool, or tarry-black stool    Negative: [1] Vomiting AND [2] contains red blood or black (\"coffee ground\") material    Negative: Vomiting is main symptom    Negative: Diarrhea is main symptom    Negative: Headache is main symptom    Negative: Patient states that he/she is having an anxiety/panic attack    Negative: Dizziness from low blood sugar (i.e., < 60 mg/dl or 3.5 mmol/l)    Negative: Dizziness is described as a spinning sensation (i.e., vertigo)    Negative: Heat exhaustion suspected    Negative: Difficulty breathing    Protocols used: DIZZINESS - LIGHTHEADEDNESS-ADULT-AH    "

## 2018-01-14 NOTE — CONSULTS
[facility]  Internal Medicine Consult     Nena Tang MRN# [id number]   Age: 57 year old YOB: 1960     Date of Admission: 1/14/2018  Date of Consult:  1/14/2018    Primary Care Provider: Rowena Haas    Requesting Service: Dr. mendez  Reason for Consult: General Medical Evaluation      SUBJECTIVE   CC:   DMII, KATY   Assessment and Plan/Recommendations:   Nena Tang is a 57 year old female with history of DMII, HTN, HLD, CINDY, CAD, GERD, uterine cancer (s/p hysterectomy 1980s), and schizoaffective disorder who was admitted to station 10 with psychiatric decompensation.    Schizoaffective disorder: With acute decompensation. Has tardive dyskinesia   - Management per psych    KATY: Cr 1.62 on admission, BL around 0.8. UA with large LE, 91 WBC, 3 RBC, bacterial, 21 squamous cells, mucous. No urinary symptoms. Likely prerenal given poor oral intake PTA  - Hold Losartan, chlorthalidone, NSAIDs  - Repeat Cr today   - Will consider fluid bolus pending repeat Cr  - Will consider repeat UA (suggested after patient showers) and urine studies if ongoing issue  ** Addendum: Renal function significantly improved over the past 24 hours. Cr 1.11 (1.62), GFR 51 (33)  - Continue to encourage oral intake and hydration  - No indication at this time for further workup  - Contact medicine in the future if oral intake is notably poor, urinary symptoms develop, or acute concerns arise  - Follow up with PCP following discharge for repeat BMP and ongoing care     Dizziness, headache: Initial presentation to [facility] was due to headache, dizziness. HCT negative. Symptoms resolved with meclizine and Ativan in the ED. Denies current symptoms. Per notes, patient thought dizziness was increased due to depression  - Several psychiatric meds that could be contributing to symptoms, will defer change in psych meds to primary team  - Continue meclozine tid prn  - Ativan per primary  - If dizziness  resumes or worsens, contact medicine for ongoing care     DMII: A1c 8.9 12/2017. PTA on dulaglutide 1.5 mg q7 days, lantus 45 units qhs, and fixed Novolog 20 units tid with meals. Glucose overall well controlled, ranging 130s-160s this admission.   - Continue PTA meds  - Hypoglycemia protocol     HTN: PTA on chlorthalidone, Losartan, metoprolol. BPs stable  - Continue PTA metoprolol with hold parameters   - Holding Chlorthalidone and Losartan given KATY  - Hydralazine prn for SBP>175 or DBP>110  ** Addendum: Given Cr improvement, ok to resume Chlorthalidone and Losartan with hold parameters. Will continue to hold Ibuprofen     HLD: Continue statin    CAD: Per chart review, h/o MI 5-6 year ago, followed by stress cardiomyopathy. Cath 2014 with 2v CAD but no flow limited lesions, minor narrowing of RCA/LCx. Stress test 2015 without perfusion defect. Echo 8/2017 EF 60-65%, RV systolic pressure elevated c/w mild pulmonary HTN  - Continue PTA ASA, metoprolol, and statin    CINDY: Nursing coordinating getting home CPAP here     GERD: Stable. Continue PTA ranitidine     H/o uterine cancer: S/p hysterectomy 1980s    Chronic constipation: Stable. Continue PTA Miralax, prn bowel regimen       Medicine will peripherally follow and treat KATY as indicted. Please contact if future questions or concerns arise. Thank you for the opportunity to be a part of this patient's care.  ** Addendum: Given the above (under KATY), medicine will sign off.      Usha Guy  Internal Medicine [facility] Hospitalist  [phone]  January 14, 2018         HPI:   Nena Tang is a 57 year old female with history of DMII, HTN, HLD, CINDY, CAD, GERD, uterine cancer (s/p hysterectomy 1980s), and schizoaffective disorder who was admitted to station 10 with psychiatric decompensation.    Per notes, patient was brought in by group home with dizziness and headaches. Noted to have worsening SI and hallucinations. Felt as if depression worsening dizziness.  Denies dizziness or headache on interview today. HCT negative in the ED. Treated with Ativan and meclozine by ED with good response. Per nursing, poor oral intake since arrival on the unit. Patient denies urinary symptoms. Reports intermittent abdominal cramping, but no symptoms since arriving on the unit. Thinks this is related to chronic constipation. Reports good control of diabetes, glucose 160s-180s PTA. Using CPAP. Denies recent illness, fever, headache, confusion, acute visual changes, dizziness, chest pain, dyspnea, cough, abdominal pain, N/V, urinary symptoms, change in bowels, peripheral edema, new onset joint pain, or rash       Past Medical History:     Past Medical History:   Diagnosis Date     CAD (coronary artery disease)     5/2014 cath, nonbostructive stenosis to LAD, RCA.     Chronic low back pain 1/22/2013     Cocaine abuse, in remission      Fecal urgency 3/8/2012     History of heroin abuse      Hyperlipidemia LDL goal <100 10/31/2010     Hypertension 7/29/2013     Illiterate 8/30/2011     Irritable bowel syndrome      Left cataract      Migraine 4/19/2012     Migraine headache 4/22/2013     Moderate major depression (H) 6/8/2011     Noncompliance with medication regimen 6/8/2011     Obesity      CINDY (obstructive sleep apnea) 3/8/2012    uses CPAP     Osteopenia 10/7/2009     Schizoaffective disorder, depressive type (H) 2/25/2013     Suicidal intent 10/2/2013     Takotsubo cardiomyopathy      Type 2 diabetes mellitus (H) 8/30/2011     Uterine cancer (H) 1983     Verbal auditory hallucination 10/4/2012        Reviewed and updated in [facility].     Past Surgical History:      Past Surgical History:   Procedure Laterality Date     C OOPHORECTORMY FOR MALIG, W/BX  1983    UTERINE     CATARACT IOL, RT/LT Bilateral 2017     COLONOSCOPY N/A 3/16/2017    Procedure: COLONOSCOPY;  Surgeon: Traci Gonzalez MD;  Location:  GI     Coronary CTA  5/21/2014     HYSTERECTOMY  1983    uterine cancer  yearly pap's per provider.     LAPAROSCOPIC CHOLECYSTECTOMY       PHACOEMULSIFICATION CLEAR CORNEA WITH STANDARD INTRAOCULAR LENS IMPLANT Left 2017    Procedure: PHACOEMULSIFICATION CLEAR CORNEA WITH STANDARD INTRAOCULAR LENS IMPLANT;  LEFT EYE PHACOEMULSIFICATION CLEAR CORNEA WITH STANDARD INTRAOCULAR LENS IMPLANT ;  Surgeon: Tyra Diaz MD;  Location:  [facility]     PHACOEMULSIFICATION CLEAR CORNEA WITH STANDARD INTRAOCULAR LENS IMPLANT Right 2017    Procedure: PHACOEMULSIFICATION CLEAR CORNEA WITH STANDARD INTRAOCULAR LENS IMPLANT;  RIGHT EYE PHACOEMULSIFICATION CLEAR CORNEA WITH STANDARD INTRAOCULAR LENS IMPLANT;  Surgeon: Tyra Diaz MD;  Location:  [facility]     RELEASE TRIGGER FINGER  10/11/2012    Left thumb. Procedure: RELEASE TRIGGER FINGER;  LEFT THUMB TRIGGER RELEASE;  Surgeon: Tya Langley MD;  Location:  [facility]     RELEASE TRIGGER FINGER Right 2016    Procedure: RELEASE TRIGGER FINGER;  Surgeon: Albino Castañeda MD;  Location: [facility] OR         Social History:   Tobacco use: denies  Alcohol use: denies  Elicit drug use: denies       Family History:     Family History   Problem Relation Age of Onset     CANCER Mother      BLADDER     Respiratory Mother      COPD     GASTROINTESTINAL DISEASE Mother      CIRRHOSIS OF LI BOLIVAR     Alcohol/Drug Mother      DIABETES Mother      Hypertension Mother      Lipids Mother      C.A.D. Mother      Glaucoma Mother      Alcohol/Drug Sister      MENTAL ILLNESS Sister      Alcohol/Drug Sister      Psychotic Disorder Sister      CANCER Maternal Grandmother      UNKNOWN TYPE     CANCER Brother      COLON     Cancer - colorectal Brother      IN HIS LATE 30S     Alcohol/Drug Brother       OF HEROIN OVERDOSE AT AGE 22 YRS     Macular Degeneration No family hx of          Allergies:     Allergies   Allergen Reactions     Imidazole Antifungals Hives     Tolerates diflucan     Ketoprofen Itching     Pruritis to topical     Lisinopril Hives      "Metformin Other (See Comments)     Patient hospitalized for lactic acidosis - admitting provider suspectd caused by metformin     Metronidazole Hives     Posaconazole Hives     Tolerates diflucan         Medications:   Reviewed. Please see MAR     Review of Systems:   10 point ROS of systems including Constitutional, Eyes, Respiratory, Cardiovascular, Gastroenterology, Genitourinary, Integumentary, Muscularskeletal, Psychiatric were all negative except for pertinent positives noted in my HPI.      OBJECTIVE   Physical Exam:   Vitals were reviewed  Blood pressure 123/74, pulse 72, temperature 97.8  F (36.6  C), temperature source Oral, resp. rate 18, height 1.527 m (5' 0.1\"), weight 100.2 kg (221 lb), last menstrual period 01/06/2015, SpO2 96 %.  General: Alert, NAD, sleepy but able to interact while sitting up in chair   HEENT: Anicteric sclera, EOMI, membranes moist  Cardiovascular: RRR, S1S2. No murmur noted  Lungs: CTAB without wheezing or crackles   GI: Abdomen soft, non-tender with bowel sounds present. No guarding or rebound present  Vascular: No peripheral edema, distal pulses palpable  Neurologic: AAO X 3, no focal deficits  Neuropsychiatric: Per psych  Skin: No jaundice, rashes, or lesions        Data:        Lab Results   Component Value Date     01/13/2018    Lab Results   Component Value Date    CHLORIDE 103 01/13/2018    Lab Results   Component Value Date    BUN 27 01/13/2018      Lab Results   Component Value Date    POTASSIUM 4.0 01/13/2018    Lab Results   Component Value Date    CO2 26 01/13/2018    Lab Results   Component Value Date    CR 1.62 01/13/2018        Lab Results   Component Value Date    WBC 9.3 01/13/2018    HGB 10.6 (L) 01/13/2018    HCT 32.2 (L) 01/13/2018    MCV 96 01/13/2018     01/13/2018     Lab Results   Component Value Date    WBC 9.3 01/13/2018            "

## 2018-01-14 NOTE — PROGRESS NOTES
01/14/18 1629   Sitting Orthostatic BP   Sitting Orthostatic BP 95/45   Sitting Orthostatic Pulse 74 bpm   Standing Orthostatic BP   Standing Orthostatic BP 71/40   Standing Orthostatic Pulse 76 bpm     Pt instructed to remain seated for next 15 minutes while drinking full 20 oz cup of water. Recheck BP/P at that time.

## 2018-01-14 NOTE — ED NOTES
"Pt arrives via EMS from group home.  Pt c/o dizziness, pt has h/o dizziness, but states that dizziness is worse today. C/o room is spinning. Dizziness that started a few days ago.  Pt ambulated from EMS [place] to bed with steady gait.  Pt has h/o diabetes, schizophrenia. blood sugar by EMS was 185.  Vitals stable.  When RN asked about harming self, pt c/o feeling depressed and states she had a thought of \"slicing wrist\" pt is hearing voices as well. No thoughts of harming others.  "

## 2018-01-14 NOTE — PLAN OF CARE
"Problem: Depressive Symptoms  Goal: Depressive Symptoms  Signs and symptoms of listed problems will be absent or manageable. Signs and symptoms of listed problems will be absent or manageable  Pt will report reduction in depression and anxiety  Pt will deny suicidal and self harm thoughts  Pt will report improved sleep as evidence by less difficulty falling asleep and staying asleep.Signs and symptoms of listed problems will be absent or manageable  Pt will report reduction in depression and anxiety  Pt will deny suicidal and self harm thoughts  Pt will report improved sleep as evidence by less difficulty falling asleep and staying asleep.   Outcome: No Change  Pt has thoughts only for SI with no plan and contracts for safety.  Pt states \"a long time ago\" she attempted SI by OD.  Pt does not have a guardian. Pt's CPAP was brought in by group home. Group home states she does not use it and sleeps well but does snore. Pt states she does not sleep well . [facility] for [facility]. Group home states they will bring the trulicity if she needs a dose, next dose due on Saturday.  Pt seen by internal medicine today (see note) BP dropped around lunch time. Last creatinine was 1.11 and GFR was 51.  Pt denied any dizziness.   Last A1c was 12/9/17 was 8.9.  Pt did not eat breakfast and slept through it. Pt did eat most of her lunch and was given novolog. BG was 141.  58 grams of carbs eaten.     Pt states her depression is 10/10 and anxiety is 9/10. Pt denied any intervention at this time.  Pt has auditory and visual hallucinations.      Pt lives in a group home that she likes and would like to return to.   Pt has one adult son.    15 minute checks initiated. Brief orientation given. Pt contracts for safety and appears to be no harm to self or others.      Group home states the pt \"gets fluid overload when given IV fluids and it goes to her lungs and causes respiratory  " "problems\"     Pt is on SI and fall precautions. Fall bracelet given to pt.        "

## 2018-01-14 NOTE — IP AVS SNAPSHOT
MRN:[id number]                      After Visit Summary   1/14/2018    Nena Tang    MRN: [id number]           Thank you!     Thank you for choosing [facility] for your care. Our goal is always to provide you with excellent care.        Patient Information     Date Of Birth          1960        Designated Caregiver       Most Recent Value    Caregiver    Will someone help with your care after discharge? yes    Name of designated caregiver Miriam metzger     Phone number of caregiver     Caregiver address [place]      About your hospital stay     You were admitted on:  January 14, 2018 You last received care in the:   [facility]    You were discharged on:  January 19, 2018        Reason for your hospital stay       Schizoaffective disorder and not using CPAP                  Who to Call     For medical emergencies, please call 911.  For non-urgent questions about your medical care, please call your primary care provider or clinic, [phone]          Attending Provider     Provider Specialty    Wade Siddiqi MD Psychiatry    [facility], Cristian Granados MD Psychiatry       Primary Care Provider Office Phone # Fax #    RowenaART Devlin [phone]      After Care Instructions     Activity       Your activity upon discharge: activity as tolerated            Diet       Follow this diet upon discharge: Orders Placed This Encounter      Snacks/Supplements Adult: Other - Please comment; 1/2 turkey sandwich with king, lettuce, tomato, cheese @2 pm; hardboiled eggs x2 and fresh fruit at 8 pm; Between Meals      Regular Diet Adult            Discharge Instructions       1. Please do not harm yourself or others.  2. Please continue to take your medications.  3. Please follow up with your outpatient care team.  4. Please do not take drugs or alcohol as this will worsen your condition.  5. Please do not take more than the prescribed doses of medications as this may make  them dangerous.   6. Please follow your safety plan of action.  7. Please call crisis if having trouble.  8. If having thoughts of harming self or others please come in to the emergency department as soon as possible.                  Follow-up Appointments     Follow Up ([facility])       Follow up with primary care provider, Rowena Haas, within 7 days for hospital follow- up.  The following labs/tests are recommended: BMP in the setting of recent KATY.      Appointments on [place] and/or [facility] (with [facility] or [facility] provider or service). Call [phone] if you haven't heard regarding these appointments within 7 days of discharge.                  Your next 10 appointments already scheduled     Jan 23, 2018  2:00 PM CST   Return Visit with ART Arias [facility] ([facility])    [address] [place] [address]  [address] [place] [id number] [phone]            Jan 23, 2018  2:00 PM CST   Return Visit with Richardson Lopez [facility] ([facility])    [address] [place] [address]  [address] [place] [address]   [phone]            Jan 23, 2018  2:00 PM CST   SHORT with Eugenia De Jesus [facility] [place] ([facility])    [address] [place] [address]  [address] [place] [address]   [phone]            Jan 31, 2018  8:15 AM CST   RETURN RETINA with Tiburcio Chacon MD   [facility] Clinic ([facility])    Kiet Cotto [address]  [facility] [address]   [phone]              Further instructions from your care team        Behavioral Discharge Planning and Instructions      Summary:  You were admitted on 1/14/2018  due to decompensation and needing medication adjustments.  You were treated by Dr. Cristian Avalos MD and  discharged on  from [facility].       Principal Diagnosis: Schizoaffective      Health Care Follow-up Appointments:  Psychiatrist Hillary Winkelmann - Next Appt 1:20pm Tuesday, 1/23/18  [facility] [place]  [phone]  Fax [phone]    Therapist Liliana Miller (Group Home will schedule for her.)  [facility] [place]note 2[facility]  [address] [place]  [phone]    PCP Rowena CORDOVA [facility]     Attend all scheduled appointments with your outpatient providers. Call at least 24 hours in advance if you need to reschedule an appointment to ensure continued access to your outpatient providers.   Major Treatments, Procedures and Findings:  You were provided with: a psychiatric assessment, assessed for medical stability, medication evaluation and/or management, group therapy and milieu management    Symptoms to Report: increased confusion, losing more sleep, mood getting worse or thoughts of suicide    Early warning signs can include: increased depression or anxiety sleep disturbances increased thoughts or behaviors of suicide or self-harm  increased unusual thinking, such as paranoia or hearing voices    Safety and Wellness:  Take all medicines as directed.  Make no changes unless your doctor suggests them.      Follow treatment recommendations.  Refrain from alcohol and non-prescribed drugs.  If there is a concern for safety, call 911.    Resources:   Crisis Intervention: 602.505.1307 or 874-454-2713 (TTY: 390.400.9000).  Call anytime for help.  National [place] on Mental Illness (www.[place].marah.org): 673.996.5916 or 156-615-4264.    The treatment team has appreciated the opportunity to work with you.     If you have any questions or concerns our unit number is [phone].    Pending Results     No orders found from 1/12/2018 to 1/15/2018.            Statement of Approval      "Ordered          01/19/18 0914  I have reviewed and agree with all the recommendations and orders detailed in this document.  EFFECTIVE NOW     Approved and electronically signed by:  Cristian Avalos MD             Admission Information     Date & Time Provider Department Dept. Phone    1/14/2018 Cristian Avalos MD [facility] [phone]      Your Vitals Were     Blood Pressure Pulse Temperature Respirations Height Weight    113/49 84 97.2  F (36.2  C) (Oral) 18 1.527 m (5' 0.1\") 99.5 kg (219 lb 4.8 oz)    Last Period Pulse Oximetry BMI (Body Mass Index)             01/06/2015 97% 42.69 kg/m2         MyChart Information     [employer] gives you secure access to your electronic health record. If you see a primary care provider, you can also send messages to your care team and make appointments. If you have questions, please call your primary care clinic.  If you do not have a primary care provider, please call [phone] and they will assist you.        Care EveryWhere ID     This is your Care EveryWhere ID. This could be used by other organizations to access your [facility] medical records  [id number]        Equal Access to Services     RAMESH GARRIDO : Hadii samira Rios, wadeejayda karthikeyan, qaybta kaalmada hossein, lorena martins. So [facility] [phone].    ATENCIÓN: Si habla español, tiene a trinh disposición servicios gratuitos de asistencia lingüística. Llame al [phone].    We comply with applicable federal civil rights laws and [place] laws. We do not discriminate on the basis of race, color, national origin, age, disability, sex, sexual orientation, or gender identity.               Review of your medicines      START taking        Dose / Directions    amantadine 100 MG capsule   Commonly known as:  SYMMETREL   Used for:  Schizoaffective disorder, depressive type (H), Tardive dyskinesia        Dose:  100 mg   Take 1 capsule (100 mg) by mouth 2 times " daily   Quantity:  60 capsule   Refills:  0         CONTINUE these medicines which may have CHANGED, or have new prescriptions. If we are uncertain of the size of tablets/capsules you have at home, strength may be listed as something that might have changed.        Dose / Directions    * blood glucose monitoring test strip   Commonly known as:  ONETOUCH VERIO IQ   This may have changed:  Another medication with the same name was changed. Make sure you understand how and when to take each.   Used for:  Type 2 diabetes mellitus with diabetic neuropathy, with long-term current use of insulin (H)        Use to test blood sugar 2 times daily or as directed.  Ok to substitute alternative if insurance prefers.   Quantity:  150 strip   Refills:  11       * ONETOUCH ULTRA test strip   This may have changed:  See the new instructions.   Used for:  Type 2 diabetes mellitus with diabetic neuropathy, with long-term current use of insulin (H)   Generic drug:  blood glucose monitoring        TEST TWICE DAILY   Quantity:  175 strip   Refills:  3       insulin glargine 100 UNIT/ML injection   Commonly known as:  LANTUS   This may have changed:  additional instructions   Used for:  Type 2 diabetes mellitus with mild nonproliferative retinopathy without macular edema, with long-term current use of insulin, unspecified laterality (H)        Dose:  45 Units   Inject 45 Units Subcutaneous At Bedtime   Quantity:  3 mL   Refills:  11       melatonin 5 MG Caps   This may have changed:    - when to take this  - additional instructions   Used for:  Insomnia, unspecified type        Dose:  1 capsule   Take 1 capsule by mouth daily At dinnertime   Quantity:  90 capsule   Refills:  1       NOVOFINE 30 30G X 8 MM   This may have changed:  See the new instructions.   Used for:  Type 2 diabetes mellitus with mild nonproliferative retinopathy without macular edema, with long-term current use of insulin, unspecified laterality (H)   Generic drug:   insulin pen needle        USE 4 DAILY OR AS DIRECTED   Quantity:  100 each   Refills:  1       vitamin D3 10495 UNITS capsule   Commonly known as:  CHOLECALCIFEROL   This may have changed:  See the new instructions.   Used for:  Vitamin D deficiency        TAKE 1 CAPSULE (50,000 UNITS) BY MOUTH ONCE A WEEK   Quantity:  4 capsule   Refills:  3       * Notice:  This list has 2 medication(s) that are the same as other medications prescribed for you. Read the directions carefully, and ask your doctor or other care provider to review them with you.      CONTINUE these medicines which have NOT CHANGED        Dose / Directions    ACETAMINOPHEN PO        Dose:  1000 mg   Take 1,000 mg by mouth every 6 hours as needed for pain or fever   Refills:  0       albuterol 108 (90 BASE) MCG/ACT Inhaler   Commonly known as:  PROAIR HFA/PROVENTIL HFA/VENTOLIN HFA   Used for:  Dyspnea on exertion        Dose:  2 puff   Inhale 2 puffs into the lungs every 6 hours as needed for shortness of breath / dyspnea or wheezing   Quantity:  3 Inhaler   Refills:  1       aspirin 81 MG EC tablet   Commonly known as:  ASPIRIN LOW DOSE   Used for:  Coronary artery disease involving native heart with angina pectoris, unspecified vessel or lesion type (H)        Dose:  81 mg   Take 1 tablet (81 mg) by mouth daily   Quantity:  100 tablet   Refills:  4       atorvastatin 80 MG tablet   Commonly known as:  LIPITOR   Used for:  Hyperlipidemia LDL goal <100        TAKE 1 TABLET (80MG) BY MOUTH DAILY   Quantity:  28 tablet   Refills:  11       blood glucose monitoring lancets   Used for:  Type 2 diabetes mellitus with diabetic neuropathy, with long-term current use of insulin (H)        Use to test blood sugar 2 times daily or as directed.  Ok to substitute alternative if insurance prefers.   Quantity:  150 each   Refills:  11       blood glucose monitoring meter device kit   Used for:  Type 2 diabetes mellitus with diabetic neuropathy, with long-term  current use of insulin (H)        Use to test blood sugar 2 times daily or as directed.  Ok to substitute alternative if insurance prefers.   Quantity:  1 kit   Refills:  0       clotrimazole 1 % cream   Commonly known as:  LOTRIMIN        Apply topically 2 times daily   Refills:  0       DIPHENDRYL PO        Dose:  25 mg   Take 25 mg by mouth every 6 hours as needed for itching   Refills:  0       gabapentin 300 MG capsule   Commonly known as:  NEURONTIN   Used for:  Type 2 diabetes mellitus with diabetic neuropathy, with long-term current use of insulin (H)        TAKE 2 CAPSULES (600MG) BY MOUTH THREE TIMES A DAY   Quantity:  168 capsule   Refills:  3       glucose 4 G Chew chewable tablet   Used for:  Type 2 diabetes mellitus with mild nonproliferative retinopathy without macular edema, with long-term current use of insulin, unspecified laterality (H)        Take 2 every 15 minutes for blood sugar <70mg/dL. Recheck blood sugar every 15 minutes until above 70mg/dL, then eat a substantial meal.   Quantity:  20 tablet   Refills:  1       * HALOPERIDOL PO        Dose:  5 mg   Take 5 mg by mouth every 12 hours as needed for agitation   Refills:  0       * haloperidol 5 MG tablet   Commonly known as:  HALDOL   Used for:  Schizoaffective disorder, depressive type (H)        Dose:  5 mg   Take 1 tablet (5 mg) by mouth 2 times daily   Quantity:  60 tablet   Refills:  0       ibuprofen 600 MG tablet   Commonly known as:  ADVIL/MOTRIN   Used for:  Closed fracture of one rib of right side, initial encounter        Dose:  600 mg   Take 1 tablet (600 mg) by mouth every 4 hours as needed for moderate pain   Quantity:  60 tablet   Refills:  0       insulin aspart 100 UNIT/ML injection   Commonly known as:  NovoLOG FLEXPEN   Used for:  Type 2 diabetes mellitus with mild nonproliferative retinopathy without macular edema, with long-term current use of insulin, unspecified laterality (H)        Dose:  20 Units   Inject 20 Units  Subcutaneous 3 times daily (with meals) Once daily, can add additional 5 units if BGs are >500mg/dL.   Quantity:  15 mL   Refills:  11       losartan 100 MG tablet   Commonly known as:  COZAAR   Used for:  Coronary artery disease involving native heart with angina pectoris, unspecified vessel or lesion type (H)        TAKE 1 TABLET (100MG) BY MOUTH DAILY   Quantity:  28 tablet   Refills:  3       metoprolol succinate 100 MG 24 hr tablet   Commonly known as:  TOPROL-XL   Used for:  Coronary artery disease involving native heart with angina pectoris, unspecified vessel or lesion type (H)        TAKE 1 TABLET (100MG) BY MOUTH DAILY   Quantity:  28 tablet   Refills:  11       * order for DME   Used for:  Falls frequently        Equipment being ordered: Rolling walker   Quantity:  1 Device   Refills:  0       * order for DME   Used for:  Type 2 diabetes mellitus with mild nonproliferative retinopathy without macular edema, with long-term current use of insulin, unspecified laterality (H)        Hold Novolog if meals are refused.   Quantity:  1 each   Refills:  0       * order for DME   Used for:  Type 2 diabetes mellitus with mild nonproliferative retinopathy without macular edema, with long-term current use of insulin, unspecified laterality (H)        Diabetic Shoes   Quantity:  2 each   Refills:  0       polyethylene glycol powder   Commonly known as:  MIRALAX/GLYCOLAX   Used for:  Constipation, unspecified constipation type        TAKE 17 GRAMS (1 CAPFUL) BY MOUTH DAILY   Quantity:  527 g   Refills:  3       prochlorperazine 5 MG tablet   Commonly known as:  COMPAZINE   Used for:  Nausea        Dose:  5 mg   Take 1 tablet (5 mg) by mouth every 6 hours as needed for nausea or vomiting   Quantity:  90 tablet   Refills:  0       ranitidine 300 MG tablet   Commonly known as:  ZANTAC   Used for:  Gastroesophageal reflux disease without esophagitis        TAKE 1 TABLET (300MG) BY MOUTH AT BEDTIME   Quantity:  90 tablet    Refills:  1       senna-docusate 8.6-50 MG per tablet   Commonly known as:  SENOKOT-S;PERICOLACE        Dose:  1 tablet   Take 1 tablet by mouth 2 times daily as needed for constipation   Refills:  0       TRULICITY 1.5 MG/0.5ML pen   Used for:  Type 2 diabetes mellitus with mild nonproliferative retinopathy without macular edema, with long-term current use of insulin, unspecified laterality (H)   Generic drug:  dulaglutide        INJECT 1.5MG SUBCUTANEOUSLY EVERY SEVEN DAYS   Quantity:  2 mL   Refills:  11       * Notice:  This list has 5 medication(s) that are the same as other medications prescribed for you. Read the directions carefully, and ask your doctor or other care provider to review them with you.      STOP taking     chlorthalidone 25 MG tablet   Commonly known as:  HYGROTON           INGREZZA 40 MG capsule   Generic drug:  valbenazine           SERTRALINE HCL PO                Where to get your medicines      These medications were sent to [address] [address]     Phone:  [phone]     amantadine 100 MG capsule    haloperidol 5 MG tablet    NOVOFINE 30 30G X 8 MM         These medications were sent to [facility] [address] [place] [address] [address] AT [facility] & [address]  [address] [facility] [address]     Phone:  [phone]     ONETOUCH ULTRA test strip                Protect others around you: Learn how to safely use, store and throw away your medicines at www.disposemymeds.org.             Medication List: This is a list of all your medications and when to take them. Check marks below indicate your daily home schedule. Keep this list as a reference.      Medications           Morning Afternoon Evening Bedtime As Needed    ACETAMINOPHEN PO   Take 1,000 mg by mouth every 6 hours as needed for pain or fever   Last time this was given:  1,000 mg on 1/18/2018  2:56 PM                                    albuterol 108 (90 BASE) MCG/ACT Inhaler   Commonly known as:  PROAIR HFA/PROVENTIL HFA/VENTOLIN HFA   Inhale 2 puffs into the lungs every 6 hours as needed for shortness of breath / dyspnea or wheezing                                   amantadine 100 MG capsule   Commonly known as:  SYMMETREL   Take 1 capsule (100 mg) by mouth 2 times daily   Last time this was given:  100 mg on 1/19/2018  8:59 AM                                      aspirin 81 MG EC tablet   Commonly known as:  ASPIRIN LOW DOSE   Take 1 tablet (81 mg) by mouth daily   Last time this was given:  81 mg on 1/19/2018  8:59 AM                                   atorvastatin 80 MG tablet   Commonly known as:  LIPITOR   TAKE 1 TABLET (80MG) BY MOUTH DAILY   Last time this was given:  80 mg on 1/19/2018  8:59 AM                                   blood glucose monitoring lancets   Use to test blood sugar 2 times daily or as directed.  Ok to substitute alternative if insurance prefers.                                         blood glucose monitoring meter device kit   Use to test blood sugar 2 times daily or as directed.  Ok to substitute alternative if insurance prefers.                                     Check before meals and as needed for hypo/hyperglycemia       * blood glucose monitoring test strip   Commonly known as:  ONETOUCH VERIO IQ   Use to test blood sugar 2 times daily or as directed.  Ok to substitute alternative if insurance prefers.                                            * ONETOUCH ULTRA test strip   TEST TWICE DAILY   Generic drug:  blood glucose monitoring                                            clotrimazole 1 % cream   Commonly known as:  LOTRIMIN   Apply topically 2 times daily                                      DIPHENDRYL PO   Take 25 mg by mouth every 6 hours as needed for itching   Last time this was given:  25 mg on 1/18/2018  8:48 PM                                   gabapentin 300 MG capsule    Commonly known as:  NEURONTIN   TAKE 2 CAPSULES (600MG) BY MOUTH THREE TIMES A DAY   Last time this was given:  600 mg on 1/19/2018  8:59 AM                                glucose 4 G Chew chewable tablet   Take 2 every 15 minutes for blood sugar <70mg/dL. Recheck blood sugar every 15 minutes until above 70mg/dL, then eat a substantial meal.                                   * HALOPERIDOL PO   Take 5 mg by mouth every 12 hours as needed for agitation   Last time this was given:  5 mg on 1/19/2018  8:59 AM                                   * haloperidol 5 MG tablet   Commonly known as:  HALDOL   Take 1 tablet (5 mg) by mouth 2 times daily   Last time this was given:  5 mg on 1/19/2018  8:59 AM                                      ibuprofen 600 MG tablet   Commonly known as:  ADVIL/MOTRIN   Take 1 tablet (600 mg) by mouth every 4 hours as needed for moderate pain                                   insulin aspart 100 UNIT/ML injection   Commonly known as:  NovoLOG FLEXPEN   Inject 20 Units Subcutaneous 3 times daily (with meals) Once daily, can add additional 5 units if BGs are >500mg/dL.   Last time this was given:  20 Units on 1/19/2018 12:57 PM                                         insulin glargine 100 UNIT/ML injection   Commonly known as:  LANTUS   Inject 45 Units Subcutaneous At Bedtime   Last time this was given:  45 Units on 1/18/2018  8:51 PM                                   losartan 100 MG tablet   Commonly known as:  COZAAR   TAKE 1 TABLET (100MG) BY MOUTH DAILY   Last time this was given:  100 mg on 1/19/2018  8:59 AM                                   melatonin 5 MG Caps   Take 1 capsule by mouth daily At dinnertime                                   metoprolol succinate 100 MG 24 hr tablet   Commonly known as:  TOPROL-XL   TAKE 1 TABLET (100MG) BY MOUTH DAILY   Last time this was given:  100 mg on 1/18/2018  8:49 AM                                   NOVOFINE 30 30G X 8 MM   USE 4 DAILY OR AS DIRECTED    Generic drug:  insulin pen needle                                * order for DME   Equipment being ordered: Rolling walker                                * order for DME   Hold Novolog if meals are refused.                                * order for DME   Diabetic Shoes                                polyethylene glycol powder   Commonly known as:  MIRALAX/GLYCOLAX   TAKE 17 GRAMS (1 CAPFUL) BY MOUTH DAILY                                   prochlorperazine 5 MG tablet   Commonly known as:  COMPAZINE   Take 1 tablet (5 mg) by mouth every 6 hours as needed for nausea or vomiting                                   ranitidine 300 MG tablet   Commonly known as:  ZANTAC   TAKE 1 TABLET (300MG) BY MOUTH AT BEDTIME   Last time this was given:  300 mg on 1/18/2018  8:47 PM                                   senna-docusate 8.6-50 MG per tablet   Commonly known as:  SENOKOT-S;PERICOLACE   Take 1 tablet by mouth 2 times daily as needed for constipation   Last time this was given:  1 tablet on 1/18/2018  8:54 AM                                   TRULICITY 1.5 MG/0.5ML pen   INJECT 1.5MG SUBCUTANEOUSLY EVERY SEVEN DAYS   Generic drug:  dulaglutide                         Once every 7 days       vitamin D3 14245 UNITS capsule   Commonly known as:  CHOLECALCIFEROL   TAKE 1 CAPSULE (50,000 UNITS) BY MOUTH ONCE A WEEK   Last time this was given:  50,000 Units on 1/16/2018 12:28 PM                         Take once every 7 days       * Notice:  This list has 7 medication(s) that are the same as other medications prescribed for you. Read the directions carefully, and ask your doctor or other care provider to review them with you.

## 2018-01-14 NOTE — PROGRESS NOTES
"Initial Psychosocial Assessment     I have reviewed the chart, met with the patient, and developed Care Plan. Information for assessment obtained by review of electronic records, spoke to [facility] provider Janes [phone].  attempted to meet with pt but she was asleep due to   Early morning admission today 18.     Presenting Problem:  Pt is a 57 year old female admitted for  with hx of schizoaffective disorder, depressive type and hx of ETOH and cocaine abuse-but has been sober for 12+ years. Pt lives in an [facility].   [facility] staff  (Janes)report pt was admitted to [facility] medical unit  2 months ago with low blood sugar. [facility] decrease her insulin \"drastically\". Outpatient providers are increasing again slowly and staff reports they believe this to be cause of pts current presentation.   Janes was surprised to hear pt was admitted to psychiatric unit as they thought she would be admitted to medical unit for recently sx(s) of confusion, dizziness, abnormal blood pressures, fatigued. Staff report pt is non-complaint with her diabetic diet.  Pt has outpatient psychiatrist  José Wild, therapist at [facility] and attends DBT day program 5 days per week.      History of Mental Health and Chemical Dependency:  Hx of schizoaffective disorder. Pt also reports she has bipolar and depression. No current substance use. Went to CD treatment 15 years ago at [facility]. Has 12+ years of sobriety.   Pt most recent  admission was at [facility] 2017 on unit 3B.      Family Description (Constellation, Family Psychiatric History):  Pt grew up with both parents and 4 sisters. 1 sister  who also had schizoaffective D/O . Pt has 2 adult sons. There are two family members with CD issues.      Significant Life Events (Illness, Abuse, Trauma, Death):  Lost her mother and husbands within a few months of each other in . She also lost her sister  who  on pt s birthday last year. "      Living Situation:  Pt lives at [facility] where she loves it. Phone : [phone]     Educational Background:  Completed high school in special ed.     Occupational History:  Worked as a  at [employer] for 4 years.      Financial Status:  SSDI, Medicare and [place]     Legal Issues:    [facility] staff report pt has no legal guardian and is able to make decisions on her own.      Ethnic/Cultural Issues:   No issues identified.     Spiritual Orientation:  Samaritan      Service History:  No     Current Treatment Providers are:     PCP:  ART Schofield CNP  [facility]  [address]  [place] [address]     Psychiatrist:  [facility]  [address] [place] [address] [phone]     Therapist:  Has not gone recently but would like an appointment  Liliana Miller  [facility]  [address],  [place], [place]   [phone]     CADI Worker:  None     Social Service Assessment/Plan:     Hospital staff will provide a safe environment and a therapeutic milieu. Pt will have psychiatric assessment and medication management by the psychiatrist. CTC will do individual inpatient treatment planning and after care planning. Staff will continue to assess pt as needed. Patient will participate in unit groups and activities. Pt will receive individual and group support on the unit.     Patient admitted for safety/stabilization of mood disorder sx's.  Medication will be reviewed, adjusted per MD's as indicated.   Will contact outpatient providers for care coordination.  Will discuss options for increased community supports.  Will continue to assess, coordinate care, and ensure appropriate f/u care is in place.

## 2018-01-14 NOTE — PLAN OF CARE
Problem: Depressive Symptoms  Goal: Depressive Symptoms  Signs and symptoms of listed problems will be absent or manageable.   Outcome: No Change  Admitted this 57 year old female from [facility]. Pt admitted on a 72 hour hold  After she presented with SI and AH. Hx of several other mental health admissions. Has dx of schizoaffective disorder. Multiple medical concerns including type 2 diabetes, HTN, CINDY and others. BG on arrival to unit 134. Pt lives in a group home in [place] ; [facility]  [phone]. Reports she has a guardian? Scot Vogt [phone].    Pt arrives on unit  at 0515, affect flat and blunted. Had complained of dizziness and needed  assist with initial ambulation. Pleasant and cooperative with admission process. Currently denying any suicidal ideation, continues to endorse AH urging suicide but reports she feels safe on the unit and denies wish to be dead. Admission profile started but needs completion. PTA med Rec completed from outdated MAR faxed over from group with current changes read out to writer by group home staff Alva. Pt oriented to room and immediately went to sleep. No behavior concerns so far.    Orders received from Dr. Siddiqi. Pt placed on fall precautions due to hx of dizziness. Also placed on suicide precaution. Report off to morning RNJulia.

## 2018-01-14 NOTE — PROGRESS NOTES
Per pharmacy, Ingrezza 40 mg and Trulicity 1.5 mg are both formulary and has to be brought from Group home. Chorthalidone 25 mg place on hold by pharmacy due to elevated LF values. Internal medicine will address this issue after consulting. Report given to Julia robles RN.

## 2018-01-14 NOTE — ED PROVIDER NOTES
History     Chief Complaint:  Dizziness and Suicidal Ideation    HPI   Nena Tang is a 57 year old female with a complex medical history including CAD, diabetes, migraines, and schizophrenia who presents to the emergency department today from her group home for evaluation of dizziness and suicidal ideation. Per EMS, the patient has a history of room-spinning dizziness and reports several days of this. She presents tonight due to concern from group home staff as she almost fell while standing up. Here, she denies any numbness, weakness, black or bloody stools, chest pain, or shortness of breath, but has been having a lot of headaches with one here at 7/10 over the past several days.  She states that she does not normally get headaches like this.  In regards to the suicidal ideation, the patient reports feeling depressed for a long time, worsened over the last couple weeks.  She also reports hearing voices telling her to hurt herself, and does not want to be here.  She has tried hurting herself in the past but here, denies any attempt though if she were to hurt herself, states that she would slit her wrists.  She believes that her depression may be influencing her dizziness.    Allergies:  Imidazole Antifungals  Ketoprofen  Lisinopril  Metformin  Metronidazole  Posaconazole    Medications:    Lantus  NovoLog  Compazine  Sertraline  Ranitidine  Haldol  Cozaar  Chlorthalidone  Gabapentin  Albuterol inhaler  Senokot  Atorvastatin  Metoprolol  MiraLAX  Melatonin  Aspirin 81    Past Medical History:    CAD (coronary artery disease)   Chronic low back pain   Cocaine abuse, in remission   Fecal urgency   History of heroin abuse   Hyperlipidemia LDL goal <100   Hypertension   Illiterate   Irritable bowel syndrome   Left cataract   Migraine   Migraine headache   Moderate major depression (H)   Noncompliance with medication regimen   Obesity   CINDY (obstructive sleep apnea)   Osteopenia   Schizoaffective disorder,  depressive type (H)   Suicidal intent   Takotsubo cardiomyopathy   Type 2 diabetes mellitus (H)   Uterine cancer (H)   Verbal auditory hallucination     Past Surgical History:    Oophorectomy  Cataract IOL, bilateral  Colonoscopy  Hysterectomy  Laparoscopic cholecystectomy  Release trigger finger x2    Family History:    Bladder cancer-mother  COPD-mother  Cirrhosis of liver-mother  Alcohol/drug-mother, sister, brother  Diabetes-mother  Hypertension-mother  Lipids-mother  CAD-mother  Glaucoma-mother  Mental illness-sister  Colorectal cancer-brother    Social History:  The patient was accompanied to the ED by EMS.  Smoking Status: Never smoker  Smokeless Tobacco: Never used  Alcohol Use: No  Marital Status:   [5]    Review of Systems   Respiratory: Negative for shortness of breath.    Cardiovascular: Negative for chest pain.   Gastrointestinal: Negative for anal bleeding and blood in stool.   Neurological: Positive for dizziness and headaches. Negative for weakness and numbness.   Psychiatric/Behavioral: Positive for dysphoric mood, hallucinations and suicidal ideas. Negative for self-injury.   All other systems reviewed and are negative.    Physical Exam     Patient Vitals for the past 24 hrs:   BP Temp Temp src Pulse Heart Rate Resp SpO2   01/14/18 0124 - - - - 75 13 95 %   01/14/18 0118 - - - - 74 14 95 %   01/14/18 0117 - - - - 75 16 96 %   01/14/18 0115 - - - - 73 15 94 %   01/14/18 0114 100/54 - - - - - -   01/14/18 0015 111/44 - - - 75 16 97 %   01/14/18 0000 (!) 116/96 - - - - - -   01/13/18 2345 111/61 - - - 73 15 96 %   01/13/18 2249 131/71 98.6  F (37  C) Oral 72 - 16 97 %     Physical Exam  Constitutional:  Oriented to person, place, and time. Depressed.  HENT:   Head:    Normocephalic.   Mouth/Throat:   Oropharynx is clear and moist.   Ears:   Tympanic membranes are clear.  Eyes:    EOM are normal. Pupils are equal, round, and reactive to light.   Neck:    Neck supple.   Cardiovascular:  Normal  rate, regular rhythm and normal heart sounds.      Exam reveals no gallop and no friction rub.       No murmur heard.  Pulmonary/Chest:  Effort normal and breath sounds normal.      No respiratory distress. No wheezes. No rales.      No reproducible chest wall pain.  Abdominal:   Soft. No distension. No tenderness. No rebound and no guarding.   Musculoskeletal:  Normal range of motion.   Neurological:   Alert and oriented to person, place, and time.           Moves all 4 extremities spontaneously      Cranial nerves 2-12 grossly normal. No meningeal signs. No ataxia. 5/5 strength and    sensation intact to light touch in all 4 extremities.   Psychiatric:  Depressed. Endorses suicidal ideation with a plan. Endorses auditory hallucinations telling her to hurt herself.   Skin:    No rash noted. No pallor.     Emergency Department Course     ECG:  ECG taken at 2250, ECG read at 2252  Normal sinus rhythm  Left axis deviation  Nonspecific T wave abnormality  Abnormal ECG  Rate 73 bpm. WI interval 198. QRS duration 86. QT/QTc 386/425. P-R-T axes 49 -47 -38.    Imaging:  Radiology findings were communicated with the patient who voiced understanding of the findings.    Head CT w/o contrast  Normal head CT.  Reading per radiology    Laboratory:  Laboratory findings were communicated with the patient who voiced understanding of the findings.    CBC: HGB 10.6 (L) o/w WNL. (WBC 9.3, )   CMP: Glucose 154 (H), Creatinine 1.62 (H), GFR 40 (L) o/w WNL  Troponin (Collected 2315): <0.015  Alcohol ethyl: <0.01    UA with micro (Collected 2307): Small urinebilin (A), Protein albumin 30 (A), Leukocyte esterase large (A), WBC/HPF 91 (H), RBC/HPF 3 (H), Bacteria many (A), Squamous Epithelial/HPF 21 (H), Transitional epi 2 (H), Mucous present (A), Hyaline casts 62 (H) o/w negative  UA with micro (Collected 0030): Glucose 50 (A), Bacteria moderate (A), Mucous present (A), Hyaline casts 4 (H) o/w negative  Drug abuse screen 77 urine:  Negative    Interventions:  2326 ns 1 L IV  2327 Zofran 4 mg IV  2328 Valium 5 mg PO  2328 Meclizine 25 mg PO    Emergency Department Course:  Nursing notes and vitals reviewed.  2252: I performed an exam of the patient as documented above.   CK hold placed.  IV was inserted and blood was drawn for laboratory testing, results above.  The patient provided a urine sample here in the emergency department. This was sent for laboratory testing, findings above.  The patient was sent for a head CT w/o contrast while in the emergency department, results above.   2346: I spoke with the DEC  Maicol regarding patient's presentation, findings, and plan of care after his evaluation of the patient. He agreed with the plan to admit for inpatient psychiatric treatment.  0115: I rechecked the patient and updated her on the results of laboratory and imaging studies and plan of care. She expressed understanding of this plan and all questions were answered. Her headache and dizziness were resolved at this time.  0126: I spoke with central intake.    Impression & Plan      Medical Decision Making:  Nena Tang is a 57 year old female came in complaining of dizziness as well as suicidal ideation.  She does discuss increased depression and suicidal thoughts over the past few weeks with voices telling her to slit her wrists.  She denies attempting any self-harm.  In regards to her dizziness, she feels as if the room is spinning.  She is on a number of psychiatric medications that certainly could be contributing to this.  She was complaining of headache as well.  I did end up getting a CT of her head to evaluate for possible mass, intracranial hemorrhage which is negative.  Her dizziness and headache are currently resolved after meclizine and Ativan.  I would highly doubt central vertigo and/or need for MRI imaging.  Workup thus far is otherwise nonspecific.  She does have an elevated creatinine without obvious source, negative  for urinary tract infection and denies any abdominal symptoms to suggest renal colic.  She is told to follow-up with primary doctor in regards to this.  In regards to her psychiatric illness, she is not safe to be discharged home.  Therefore, she has been placed on an CK hold and will be transferred to [facility].    Diagnosis:    ICD-10-CM    1. Vertigo R42    2. Suicidal ideation R45.851    3. KATY (acute kidney injury) (H) N17.9      Disposition:   Transfer to [facility] on CK hold    Scribe Disclosure:  I, Alea Palacio, am serving as a scribe at 10:52 PM on 1/13/2018 to document services personally performed by Connor Bah MD, based on my observations and the provider's statements to me.    1/13/2018   [facility] EMERGENCY DEPARTMENT       Connor Bah MD  01/14/18 0215

## 2018-01-14 NOTE — ED NOTES
Bed: ED01  Expected date: 1/13/18  Expected time: 10:35 PM  Means of arrival: Ambulance  Comments:  EFREM1

## 2018-01-14 NOTE — PROGRESS NOTES
01/14/18 0536   Patient Belongings   Did you bring any home meds/supplements to the hospital?  No   Patient Belongings clothing   Disposition of Belongings Locker   Belongings Search Yes   Clothing Search Yes   Second Staff Blanka WILCOX     In Locker:  1 Shirt   1 Pair of Slippers  1 Hat  1 Scarf  1 Pair of Pajama Pants  1 Pair of Gloves  1 Jacket  Earings in Plastic Bag.    In Jacket in Locker:  Various Business Cards    A               Admission:  I am responsible for any personal items that are not sent to the safe or pharmacy.  [facility] is not responsible for loss, theft or damage of any property in my possession.    Signature:  _________________________________ Date: _______  Time: _____                                              Staff Signature:  ____________________________ Date: ________  Time: _____      2nd Staff person, if patient is unable/unwilling to sign:    Signature: ________________________________ Date: ________  Time: _____     Discharge:  [place] has returned all of my personal belongings:    Signature: _________________________________ Date: ________  Time: _____                                          Staff Signature:  ____________________________ Date: ________  Time: _____

## 2018-01-14 NOTE — ED NOTES
DEC eval complete and in chart. Attempts were made to call central intake to provide clinical but placed on hold for extended period of time. Please try calling central intake back to inquire about psych bed availability to coordinate placement. CAROLANN

## 2018-01-14 NOTE — PROGRESS NOTES
01/14/18 1650   Sitting Orthostatic BP   Sitting Orthostatic /61   Sitting Orthostatic Pulse 76 bpm   Standing Orthostatic BP   Standing Orthostatic /60   Standing Orthostatic Pulse 77 bpm     Improved after fluid intake

## 2018-01-15 LAB
GLUCOSE BLDC GLUCOMTR-MCNC: 119 MG/DL (ref 70–99)
GLUCOSE BLDC GLUCOMTR-MCNC: 125 MG/DL (ref 70–99)
GLUCOSE BLDC GLUCOMTR-MCNC: 134 MG/DL (ref 70–99)
GLUCOSE BLDC GLUCOMTR-MCNC: 184 MG/DL (ref 70–99)
GLUCOSE BLDC GLUCOMTR-MCNC: 64 MG/DL (ref 70–99)
GLUCOSE BLDC GLUCOMTR-MCNC: 84 MG/DL (ref 70–99)

## 2018-01-15 PROCEDURE — A9270 NON-COVERED ITEM OR SERVICE: HCPCS | Mod: GY | Performed by: PSYCHIATRY & NEUROLOGY

## 2018-01-15 PROCEDURE — 25000132 ZZH RX MED GY IP 250 OP 250 PS 637: Mod: GY | Performed by: PHYSICIAN ASSISTANT

## 2018-01-15 PROCEDURE — A9270 NON-COVERED ITEM OR SERVICE: HCPCS | Mod: GY | Performed by: PHYSICIAN ASSISTANT

## 2018-01-15 PROCEDURE — 12400007 ZZH R&B MH INTERMEDIATE UMMC

## 2018-01-15 PROCEDURE — 00000146 ZZHCL STATISTIC GLUCOSE BY METER IP

## 2018-01-15 PROCEDURE — 25000132 ZZH RX MED GY IP 250 OP 250 PS 637: Mod: GY | Performed by: PSYCHIATRY & NEUROLOGY

## 2018-01-15 PROCEDURE — 99232 SBSQ HOSP IP/OBS MODERATE 35: CPT | Performed by: PSYCHIATRY & NEUROLOGY

## 2018-01-15 RX ADMIN — AMANTADINE HYDROCHLORIDE 100 MG: 100 CAPSULE ORAL at 08:54

## 2018-01-15 RX ADMIN — METOPROLOL SUCCINATE 100 MG: 100 TABLET, EXTENDED RELEASE ORAL at 08:54

## 2018-01-15 RX ADMIN — RANITIDINE 300 MG: 150 TABLET, FILM COATED ORAL at 20:54

## 2018-01-15 RX ADMIN — INSULIN ASPART 20 UNITS: 100 INJECTION, SOLUTION INTRAVENOUS; SUBCUTANEOUS at 08:58

## 2018-01-15 RX ADMIN — GABAPENTIN 600 MG: 300 CAPSULE ORAL at 13:48

## 2018-01-15 RX ADMIN — HALOPERIDOL 5 MG: 5 TABLET ORAL at 08:54

## 2018-01-15 RX ADMIN — ATORVASTATIN CALCIUM 80 MG: 80 TABLET, FILM COATED ORAL at 08:54

## 2018-01-15 RX ADMIN — Medication 5 MG: at 20:54

## 2018-01-15 RX ADMIN — GABAPENTIN 600 MG: 300 CAPSULE ORAL at 08:54

## 2018-01-15 RX ADMIN — AMANTADINE HYDROCHLORIDE 100 MG: 100 CAPSULE ORAL at 20:54

## 2018-01-15 RX ADMIN — LOSARTAN POTASSIUM 100 MG: 50 TABLET, FILM COATED ORAL at 08:54

## 2018-01-15 RX ADMIN — INSULIN ASPART 20 UNITS: 100 INJECTION, SOLUTION INTRAVENOUS; SUBCUTANEOUS at 18:12

## 2018-01-15 RX ADMIN — ASPIRIN 81 MG: 81 TABLET, COATED ORAL at 08:54

## 2018-01-15 RX ADMIN — HALOPERIDOL 5 MG: 5 TABLET ORAL at 20:54

## 2018-01-15 RX ADMIN — INSULIN GLARGINE 45 UNITS: 100 INJECTION, SOLUTION SUBCUTANEOUS at 20:56

## 2018-01-15 RX ADMIN — CHLORTHALIDONE 25 MG: 25 TABLET ORAL at 08:54

## 2018-01-15 RX ADMIN — GABAPENTIN 600 MG: 300 CAPSULE ORAL at 20:54

## 2018-01-15 ASSESSMENT — ACTIVITIES OF DAILY LIVING (ADL)
GROOMING: INDEPENDENT
DRESS: INDEPENDENT
ORAL_HYGIENE: INDEPENDENT
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
## 2018-01-15 NOTE — PROGRESS NOTES
Pt has not attended scheduled occupational therapy sessions. Encourage attendance and participation. Pt will be given self-assessment form, and OT staff will explain the purpose of including them in their treatment plan and offer options for meeting their needs.

## 2018-01-15 NOTE — PLAN OF CARE
Problem: Patient Care Overview  Goal: Team Discussion  Team Plan:   BEHAVIORAL TEAM DISCUSSION    Participants: Dr. Cristian Avalos; Jemima STEWARD; Sobeida JIN; Tia Bourgeois OTR/L    Progress: Met with patient who has been in her room all day.  She has not attended any programming at this point but is pleasant and cooperative with care.    Continued Stay Criteria/Rationale: Needs stabilization    Medical/Physical: See Consult    Precautions:   Behavioral Orders   Procedures     Code 1 - Restrict to Unit     Fall precautions     Routine Programming     As clinically indicated     Status 15     Every 15 minutes.     Suicide precautions     Plan: Patient's medications are being adjusted and her mood needs to be stabilized. She will be sent back to her group home[facility] at discharge.  [facility] to ensure she has a comprehensive care plan at discharge.    Rationale for change in precautions or plan: No changes

## 2018-01-15 NOTE — TELEPHONE ENCOUNTER
Prescription approved per [facility] Refill Protocol.    Antonella Woodall RN   [facility]

## 2018-01-15 NOTE — PROGRESS NOTES
"Pt was hearing voices this morning but not in the afternoon; \"They are annoying\". Pt has been in bed 90% of the shift, usually sleeping. Pt did not attend groups but her goal today was to be \"out there doing things\". Pt said she's depressed and anxious. Pt has passive and fleeting suicidal thoughts; \"I wouldn't care if I didn't wake up in the morning\". Pt contracted for safety.     01/15/18 1501   Behavioral Health   Hallucinations denies / not responding to hallucinations  (but said she heard voices in the am)   Thinking distractable   Orientation person: oriented;place: oriented;date: oriented;time: oriented   Memory baseline memory   Insight insight appropriate to situation   Judgement impaired   Eye Contact out of corner of eyes   Affect blunted, flat   Mood depressed;anxious;hopeless   Physical Appearance/Attire appears stated age;untidy   Hygiene neglected grooming - unclean body, hair, teeth   Suicidality thoughts only   1. Wish to be Dead Yes  (apathetic)   2. Non-Specific Active Suicidal Thoughts  No   Self Injury (denies)   Elopement (no signs of eloping)   Activity isolative;withdrawn   Speech coherent;clear   Medication Sensitivity no stated side effects;no observed side effects   Psychomotor / Gait balanced;steady;slow   Coping/Psychosocial   Verbalized Emotional State anxiety;depression;hopelessness;suicidal thoughts   Behavioral Health Interventions   Depression maintain safety precautions;monitor need to revise level of observation;maintain safe secure environment;encourage nutrition and hydration;encourage participation / independence with adls;provide emotional support;establish therapeutic relationship;assist with developing and utilizing healthy coping strategies;assess patient response to medication;assess medication adherance;monitor need for prn medication   Social and Therapeutic Interventions (Depression) encourage socialization with peers;encourage effective boundaries with peers;encourage " participation in therapeutic groups and milieu activities

## 2018-01-15 NOTE — PROGRESS NOTES
"Introduced myself to the patient who was in bed and resting.  She lives at [facility] and \"loves it\" there.  She sees Psychiatrist Hillary Winkelmann at [facility] and wants a new therapy appt with Liliana Miller at [facility].  She will be encouraged to attend all programming and has been instructed to let the staff know if she questions or concerns. Pleasant and cooperative with care.  "

## 2018-01-15 NOTE — PROGRESS NOTES
[facility]   Psychiatric Progress Note  Nena Tang  [id number]  01/15/18    Chief Complaint: Continued medical care          Interim History:   The patient's care was discussed with the treatment team during the daily team meeting and/or staff's chart notes were reviewed.  Staff report patient has been having auditory hallucinations and continued thoughts of suicide and slept about 7 hours.    Nena reports that she is feeling pretty good though she continues to have auditory hallucinations that she cannot control that tell her to harm herself.  She continues to have suicidal thoughts because of this but she does not believe that she will act upon any of it.  She feels safe here in the hospital and has been sleeping well though she has been taking naps at times.  She has been not having any paranoia or hopelessness or helplessness or anxiety troubles while on the unit.  She has been having poor energy as well as attention and concentration.  She again reports that the sertraline was not previously helpful and I discontinued at this morning.  She was informed about other options such as venlafaxine or bupropion and also ziprasidone and we discussed the risks and benefits in detail with these medication changes.  She was not interested in making medication changes at this time wanted to think about the information and discuss it more tomorrow.    We additionally talked about her current legal status and she requested to be placed in the hospital voluntarily.         Medications:       aspirin  81 mg Oral Daily     atorvastatin  80 mg Oral Daily     clotrimazole   Topical BID     gabapentin  600 mg Oral TID     haloperidol  5 mg Oral BID     insulin aspart  20 Units Subcutaneous TID w/meals     insulin glargine  45 Units Subcutaneous At Bedtime     polyethylene glycol  17 g Oral Daily     ranitidine  300 mg Oral At Bedtime     metoprolol succinate  100 mg Oral Daily      "melatonin  5 mg Oral At Bedtime     [START ON 1/16/2018] vitamin D3  50,000 Units Oral Q7 Days     [START ON 1/20/2018] dulaglutide  1.5 mg Subcutaneous Q7 Days     pneumococcal vaccine  0.5 mL Intramuscular Prior to discharge     losartan  100 mg Oral Daily     chlorthalidone  25 mg Oral Daily     amantadine  100 mg Oral BID          Allergies:     Allergies   Allergen Reactions     Imidazole Antifungals Hives     Tolerates diflucan     Ketoprofen Itching     Pruritis to topical     Lisinopril Hives     Metformin Other (See Comments)     Patient hospitalized for lactic acidosis - admitting provider suspectd caused by metformin     Metronidazole Hives     Posaconazole Hives     Tolerates diflucan          Labs:     Recent Results (from the past 24 hour(s))   Glucose by meter    Collection Time: 01/14/18  5:43 PM   Result Value Ref Range    Glucose 179 (H) 70 - 99 mg/dL   Glucose by meter    Collection Time: 01/14/18  9:47 PM   Result Value Ref Range    Glucose 137 (H) 70 - 99 mg/dL   Glucose by meter    Collection Time: 01/15/18  2:33 AM   Result Value Ref Range    Glucose 134 (H) 70 - 99 mg/dL   Glucose by meter    Collection Time: 01/15/18  8:40 AM   Result Value Ref Range    Glucose 125 (H) 70 - 99 mg/dL          Psychiatric Examination:     /73  Pulse 81  Temp 97.1  F (36.2  C) (Oral)  Resp 16  Ht 1.527 m (5' 0.1\")  Wt 100.2 kg (221 lb)  LMP 01/06/2015  SpO2 97%  BMI 43.02 kg/m2  Weight is 221 lbs 0 oz  Body mass index is 43.02 kg/(m^2).                Sitting Orthostatic BP: 117/73      Sitting Orthostatic Pulse: 81 bpm      Standing Orthostatic BP: 88/56      Standing Orthostatic Pulse: 85 bpm       Weight over time:  Vitals:    01/14/18 0558   Weight: 100.2 kg (221 lb)       Orthostatic Vitals       Most Recent      Sitting Orthostatic /73 01/15 1200    Sitting Orthostatic Pulse (bpm) 81 01/15 1200    Standing Orthostatic BP 88/56 01/15 1200    Standing Orthostatic Pulse (bpm) 85 01/15 " 1200            Nena 57-year-old female wearing hospital scrubs and is obese.  Her speech is simplistic and monotone without other abnormalities.  Her behavior is appropriative though she does have some tardive dyskinesia type movements of both her legs and her mouth.  Her affect is subdued.  Her mood she describes as pretty good.  Her thought content consists of the above without thoughts of harming herself or others or current delusions.  Her thought process is concrete without looseness of association.  She does have abnormal perceptions currently.  Her attention and concentration is limited.  Her cognition and fund of knowledge is below average.  Her long-term/short-term/remote memory appears limited.  Her insight and judgment are both limited.         Precautions:     Behavioral Orders   Procedures     Code 1 - Restrict to Unit     Fall precautions     Routine Programming     As clinically indicated     Status 15     Every 15 minutes.     Suicide precautions          DIagnoses:     Schizoaffective disorder bipolar type most recently depressed  Alcohol use disorder in full remission  Cocaine use disorder in full remission         Assessment & Plan:     Nena potentially has some worsening auditory hallucinations that tell her to harm herself likely leading to her decompensation.  She notes that that is the worst thing she is experiencing is the auditory hallucinations though she would like to stay away from medications that have poor side effects such as weight gain or diabetes dysfunction.  She seems to make appropriate decision making when discussing the risks and benefits of all the medications that she could potentially switch to.  She is going to think about potentially taking ziprasidone and also venlafaxine and we will switch her from a 72 hour hold to a voluntary hospitalization and continue to monitor and evaluate her current symptoms.  She was not interested in clozapine, weight gain type  medications, ECT, or lurasidone.    Change from 72 hour hold to voluntary hospitalization  Discontinue sertraline for lack of previous benefit  Discussing future medication changes    The risks, benefits, alternatives and side effects have been discussed and are understood by the patient and other caregivers.      Cristian Joseph  [facility]      The following document has been created with voice recognition software and may contain unintentional word substitutions.

## 2018-01-15 NOTE — PROGRESS NOTES
Nena was quiet throughout the evening. She was often in the milieu, but kept to herself most of the time. She rated her depression an 8/10 and her anxiety a 7/10, 10 being most severe. She has been having auditory and visual hallucinations, but did not describe any further; she did say, however, that those hallucinations are unchanged (no better nor worse) than preceding events.    Nena has been having suicidal thoughts and urges to hurt herself. The bathroom lights in her room have been turned to provide further monitoring (with her CPAP). She does contract for safety and knows that she can talk to staff when those thoughts become overwhelming. No elopement concerns at this time, and no aggressive behaviors.     01/14/18 2221   Behavioral Health   Hallucinations auditory;visual   Thinking intact   Orientation person: oriented;place: oriented;time: oriented   Memory baseline memory  (denies memory issues)   Insight other (see comment)  (fair)   Judgement intact   Eye Contact at examiner   Affect blunted, flat;sad   Mood depressed;hopeless;anxious;mood is calm   Physical Appearance/Attire attire appropriate to age and situation;appears stated age   Hygiene neglected grooming - unclean body, hair, teeth   Suicidality thoughts only;other (see comments)  (pt contracts for safety)   1. Wish to be Dead Yes   Wish to be Dead Description verbal confirmation, no specific comment   2. Non-Specific Active Suicidal Thoughts  Yes   Non-Specific Active Suicidal Thought Description verbal confirmation, no specific comment   Self Injury urges;other (see comment)  (pt contracts for safety)   Elopement (BY[place])   Activity other (see comment)  (visible in milieu but often withdrawn)   Speech clear;coherent   Medication Sensitivity no stated side effects;no observed side effects   Psychomotor / Gait balanced;steady;slow   Activities of Daily Living   Hygiene/Grooming independent   Oral Hygiene independent   Dress scrubs (behavioral  health);independent   Room Organization independent   Behavioral Health Interventions   Depression maintain safety precautions;maintain safe secure environment;establish therapeutic relationship

## 2018-01-16 LAB
GLUCOSE BLDC GLUCOMTR-MCNC: 106 MG/DL (ref 70–99)
GLUCOSE BLDC GLUCOMTR-MCNC: 116 MG/DL (ref 70–99)
GLUCOSE BLDC GLUCOMTR-MCNC: 129 MG/DL (ref 70–99)
GLUCOSE BLDC GLUCOMTR-MCNC: 165 MG/DL (ref 70–99)
GLUCOSE BLDC GLUCOMTR-MCNC: 187 MG/DL (ref 70–99)

## 2018-01-16 PROCEDURE — 25000132 ZZH RX MED GY IP 250 OP 250 PS 637: Mod: GY | Performed by: PSYCHIATRY & NEUROLOGY

## 2018-01-16 PROCEDURE — A9270 NON-COVERED ITEM OR SERVICE: HCPCS | Mod: GY | Performed by: PHYSICIAN ASSISTANT

## 2018-01-16 PROCEDURE — 90853 GROUP PSYCHOTHERAPY: CPT

## 2018-01-16 PROCEDURE — 00000146 ZZHCL STATISTIC GLUCOSE BY METER IP

## 2018-01-16 PROCEDURE — 25000132 ZZH RX MED GY IP 250 OP 250 PS 637: Mod: GY | Performed by: PHYSICIAN ASSISTANT

## 2018-01-16 PROCEDURE — 99232 SBSQ HOSP IP/OBS MODERATE 35: CPT | Performed by: PSYCHIATRY & NEUROLOGY

## 2018-01-16 PROCEDURE — 12400007 ZZH R&B MH INTERMEDIATE UMMC

## 2018-01-16 PROCEDURE — A9270 NON-COVERED ITEM OR SERVICE: HCPCS | Mod: GY | Performed by: PSYCHIATRY & NEUROLOGY

## 2018-01-16 PROCEDURE — 97150 GROUP THERAPEUTIC PROCEDURES: CPT | Mod: GO

## 2018-01-16 RX ADMIN — LOSARTAN POTASSIUM 100 MG: 50 TABLET, FILM COATED ORAL at 09:13

## 2018-01-16 RX ADMIN — ASPIRIN 81 MG: 81 TABLET, COATED ORAL at 09:14

## 2018-01-16 RX ADMIN — RANITIDINE 300 MG: 150 TABLET, FILM COATED ORAL at 20:22

## 2018-01-16 RX ADMIN — INSULIN ASPART 20 UNITS: 100 INJECTION, SOLUTION INTRAVENOUS; SUBCUTANEOUS at 13:13

## 2018-01-16 RX ADMIN — HALOPERIDOL 5 MG: 5 TABLET ORAL at 20:22

## 2018-01-16 RX ADMIN — GABAPENTIN 600 MG: 300 CAPSULE ORAL at 13:57

## 2018-01-16 RX ADMIN — METOPROLOL SUCCINATE 100 MG: 100 TABLET, EXTENDED RELEASE ORAL at 09:13

## 2018-01-16 RX ADMIN — CHLORTHALIDONE 25 MG: 25 TABLET ORAL at 09:14

## 2018-01-16 RX ADMIN — Medication 5 MG: at 20:22

## 2018-01-16 RX ADMIN — GABAPENTIN 600 MG: 300 CAPSULE ORAL at 20:22

## 2018-01-16 RX ADMIN — CHOLECALCIFEROL CAP 1.25 MG (50000 UNIT) 50000 UNITS: 1.25 CAP at 12:28

## 2018-01-16 RX ADMIN — INSULIN ASPART 20 UNITS: 100 INJECTION, SOLUTION INTRAVENOUS; SUBCUTANEOUS at 17:59

## 2018-01-16 RX ADMIN — GABAPENTIN 600 MG: 300 CAPSULE ORAL at 09:13

## 2018-01-16 RX ADMIN — AMANTADINE HYDROCHLORIDE 100 MG: 100 CAPSULE ORAL at 09:13

## 2018-01-16 RX ADMIN — HALOPERIDOL 5 MG: 5 TABLET ORAL at 09:14

## 2018-01-16 RX ADMIN — INSULIN GLARGINE 45 UNITS: 100 INJECTION, SOLUTION SUBCUTANEOUS at 20:27

## 2018-01-16 RX ADMIN — ATORVASTATIN CALCIUM 80 MG: 80 TABLET, FILM COATED ORAL at 09:14

## 2018-01-16 RX ADMIN — ACETAMINOPHEN 1000 MG: 500 TABLET ORAL at 19:44

## 2018-01-16 RX ADMIN — AMANTADINE HYDROCHLORIDE 100 MG: 100 CAPSULE ORAL at 20:22

## 2018-01-16 ASSESSMENT — ACTIVITIES OF DAILY LIVING (ADL)
LAUNDRY: WITH SUPERVISION
GROOMING: INDEPENDENT
ORAL_HYGIENE: INDEPENDENT
DRESS: INDEPENDENT

## 2018-01-16 NOTE — PROGRESS NOTES
Behavioral Health  Note    Behavioral Health  Spirituality Group Note    [address]    Name: Nena Tang YOB: 1960   MRN: [id number] Age: 57 year old      Patient attended -led group, which included discussion of spirituality, coping with illness and building resilience.    Patient attended group for 1.0 hrs.    The patient actively participated in group discussion on the topic of new chances.      Merced Ledesma  Chaplain Resident  Pager [phone]

## 2018-01-16 NOTE — PROGRESS NOTES
"CLINICAL NUTRITION SERVICES - ASSESSMENT NOTE     Nutrition Prescription    RECOMMENDATIONS FOR MDs/PROVIDERS TO ORDER:  None today    Malnutrition Status:    Patient does not meet two of the above criteria necessary for diagnosing malnutrition    Recommendations already ordered by Registered Dietitian (RD):  ordered 1/2 turkey sandwich at 2 pm and hardboiled eggs x2 with fresh fruit at 8 pm    Future/Additional Recommendations:  Encourage PO intakes of small frequent meals and snacks. Encourage healthy selections such as fruit, vegetables, low-fat dairy, whole grains, lean proteins.     Consider adding Glucerna supplement if PO continues to decline.      REASON FOR ASSESSMENT  Nena Tang is a 57 year old female assessed by the dietitian for RN Consult - Pt Type 2 IDDM but poor intake. Please meet with pt to help with food options.     NUTRITION HISTORY  Patient reports she typically eats very well at her group home, including a lot of veggies. She takes SSI insulin but doesn't count carbohydrates.     CURRENT NUTRITION ORDERS  Diet: Regular  Intake/Tolerance: Patient reports she has a poor appetite and has been eating small amounts at meals. She orders what sounds good on the menu but often is not able to finish >50% of trays.     LABS  Labs reviewed  BG range:  mg/dL  Hemoglobin A1C from 12/9/17: 8.9% (uncontrolled)    MEDICATIONS  Medications reviewed  Lantus  Novolog 20 Units before meals  Vitamin D    ANTHROPOMETRICS  Height: 152.7 cm (5' .1\")  Most Recent Weight: 99.5 kg (219 lb 6.4 oz)    IBW: 48 kg  BMI: Obesity Grade III BMI >40  Weight History: Patient doesn't know her UBW. Per chart, it appears patient has lost 2.6 kg (3%) over the past month.   Wt Readings from Last 10 Encounters:   01/16/18 99.5 kg (219 lb 6.4 oz)   12/29/17 100.5 kg (221 lb 8 oz)   12/09/17 102.1 kg (225 lb)   11/27/17 104.8 kg (231 lb)   11/09/17 104.4 kg (230 lb 1.6 oz)   11/03/17 103 kg (227 lb)   10/23/17 102.5 kg (226 " lb)   09/07/17 101.2 kg (223 lb)   09/04/17 101.8 kg (224 lb 6.4 oz)   08/22/17 104.1 kg (229 lb 8 oz)       Dosing Weight: 61 kg (adjusted weight)     ASSESSED NUTRITION NEEDS  Estimated Energy Needs: 4731-2535 kcals/day (25 - 30 kcals/kg)  Justification: Maintenance  Estimated Protein Needs: 49-61 grams protein/day (0.8 - 1 grams of pro/kg)  Justification: Maintenance  Estimated Fluid Needs: (1 mL/kcal)   Justification: Maintenance    PHYSICAL FINDINGS  See malnutrition section below.    MALNUTRITION  % Intake: Decreased intake does not meet criteria  % Weight Loss: Up to 5% in 1 month (non-severe)  Subcutaneous Fat Loss: None observed  Muscle Loss: None observed  Fluid Accumulation/Edema: None noted  Malnutrition Diagnosis: Patient does not meet two of the above criteria necessary for diagnosing malnutrition    NUTRITION DIAGNOSIS  Inadequate oral intake related to decreased appetite as evidenced by patient report of eating <50% of meal trays with some snacks over the past week, weight loss of ~3% over the past month.       INTERVENTIONS  Implementation  Nutrition Education: Provided education on importance of regular meals with consistent carbohydrate amounts throughout the day to improve blood glucose control. Encouraged smaller, frequent meals to manage low appetite and suggested sending scheduled snacks per pt preference.    Modify composition of meals/snacks - ordered 1/2 turkey sandwich at 2 pm and hardboiled eggs x2 with fresh fruit at 8 pm    Goals  1. Patient to consume % of nutritionally adequate meal trays TID, or the equivalent with supplements/snacks.  2. BG within normal range (<99 mg/dL fasting).      Monitoring/Evaluation  Progress toward goals will be monitored and evaluated per protocol.    Daniela Dasilva RD, LD  Unit Pager: [phone]

## 2018-01-16 NOTE — PROGRESS NOTES
"INITIAL O.T. ASSESSMENT: Nena has attended 2 OT sessions since admission.  Was given and completed a written self assessment which was reviewed with this writer. OT staff explained the purpose of patient being involved in current treatment plan.     Patient identified experiencing the following symptoms, selected from checklist provided:     Feelings -      Sadness, Anxiety, Anger, Feeling abandoned, Feeling helpless, Rage, Mood swings, Depression, Guilt, Despair  Thoughts -  Negative thoughts, Racing thoughts, Memory problems, Trouble concentrating, Trouble making decisions, Hearing voices, Nightmares, Slowed thinking, Blanking out, Suicidal thoughts, Obsessive thoughts  Behaviors -    Suicidal gesture, Self-harm, Social withdrawal, Hostile reactions to others, Restlessness, Problems starting/completing projects, Procrastinating, Eating disorder    When asked about coping skills that she used regularly, pt wrote \"talking with someone, staying on meds\"    When asked about personal supports in her life, pt wrote \"son, , therapist\"    When asked about personal strengths, pt wrote \"kind-hearted\"      Goals prioritized for hospitalization (selected from list): To get better, Make voices go away      Plan: Encourage ongoing attendance as able to meet self-stated goals, implement positive coping skills, engage in graded opportunities for success, and provide assessment ongoing.     "

## 2018-01-16 NOTE — PROGRESS NOTES
Spoke with DAVIN Calle at the patient's group home-- [facility].  Provided her with an update. Team discuss possible discharge of patient on Friday so this writer tentatively arranged to have their staff  the patient on Friday, 1/19 at 2pm here on unit. This is pending my discussion with Dr. Avalos tomorrow.  Also, Alva requested this writer make another psychiatric appointment with Dr. Hillary Winkelmann at [facility].  Any medication changes should be escripted over to [facility] in [place] on (Thursday) so they can be delivered to the group home on Friday.

## 2018-01-16 NOTE — PROGRESS NOTES
"   01/16/18 1300   Behavioral Health   Hallucinations auditory   Thinking poor concentration   Orientation person: oriented;place: oriented   Memory baseline memory   Insight poor   Judgement impaired   Eye Contact at examiner   Affect blunted, flat   Mood mood is calm   Physical Appearance/Attire neat   Hygiene well groomed   Suicidality other (see comments)  (denies )   1. Wish to be Dead No   2. Non-Specific Active Suicidal Thoughts  No   3. Active Sucidal Ideation with any Methods (Not Plan) Without Intent to Act  No   4. Active Suicidal Ideation with Some Intent to Act, Without Specific Plan  No   5. Active Suicidal Ideation with Specific Plan and Intent  No   Self Injury other (see comment)  (denies )   Elopement (None reported or observed )   Activity other (see comment)  (attended group and was visible in the milieu )   Speech clear;coherent   Activities of Daily Living   Hygiene/Grooming independent   Oral Hygiene independent   Dress independent   Laundry with supervision   Room Organization independent       Pt denied SI and SIB.  Pt reported feeling depressed (7), irritated (7), agitated (7), sad (7), confused (6) and anxious (6) \" I don't know just worried about things  I can't do nothing about.\"  Pt reported overall feeling \" pretty good.\"  Pt reported little to no appetite.  Pt reported having psychotic symptoms and having racing thoughts.  Pt daily goal \" stop hearing voices.\"   "

## 2018-01-16 NOTE — PROGRESS NOTES
[facility], [place]   Psychiatric Progress Note  Nena Tang  [id number]  01/16/18    Chief Complaint: Continued medical care          Interim History:   The patient's care was discussed with the treatment team during the daily team meeting and/or staff's chart notes were reviewed.  Staff report patient has been primarily spends spending time in bed and had elevated glucose and slept about 8 hours.    Nena upon meeting with me describes that she is feeling better and she used her CPAP machine last night.  She feels more rested and the auditory hallucinations have improved and she is no longer feeling suicidal.  She reports that she will use the CPAP at home when she leaves and she is thinking about leaving after making sure she is safe on Friday.  She has not noticed any worsening in hopelessness or helplessness and does not feel hopeless or helpless or have any worsening sadness after being off of her antidepressant medication.  Her appetite is still a little different and she has not been involved in group activities.  She has been having some constipation but has not noticed any other side effects and is not currently paranoid or worried about other people on the unit.  She does not describe any anxiety conditions and feels as though things are getting better.         Medications:       aspirin  81 mg Oral Daily     atorvastatin  80 mg Oral Daily     clotrimazole   Topical BID     gabapentin  600 mg Oral TID     haloperidol  5 mg Oral BID     insulin aspart  20 Units Subcutaneous TID w/meals     insulin glargine  45 Units Subcutaneous At Bedtime     polyethylene glycol  17 g Oral Daily     ranitidine  300 mg Oral At Bedtime     metoprolol succinate  100 mg Oral Daily     melatonin  5 mg Oral At Bedtime     vitamin D3  50,000 Units Oral Q7 Days     [START ON 1/20/2018] dulaglutide  1.5 mg Subcutaneous Q7 Days     pneumococcal vaccine  0.5 mL Intramuscular Prior to discharge  "    losartan  100 mg Oral Daily     chlorthalidone  25 mg Oral Daily     amantadine  100 mg Oral BID          Allergies:     Allergies   Allergen Reactions     Imidazole Antifungals Hives     Tolerates diflucan     Ketoprofen Itching     Pruritis to topical     Lisinopril Hives     Metformin Other (See Comments)     Patient hospitalized for lactic acidosis - admitting provider suspectd caused by metformin     Metronidazole Hives     Posaconazole Hives     Tolerates diflucan          Labs:     Recent Results (from the past 24 hour(s))   Glucose by meter    Collection Time: 01/15/18 12:52 PM   Result Value Ref Range    Glucose 64 (L) 70 - 99 mg/dL   Glucose by meter    Collection Time: 01/15/18  1:15 PM   Result Value Ref Range    Glucose 84 70 - 99 mg/dL   Glucose by meter    Collection Time: 01/15/18  5:47 PM   Result Value Ref Range    Glucose 119 (H) 70 - 99 mg/dL   Glucose by meter    Collection Time: 01/15/18  8:54 PM   Result Value Ref Range    Glucose 184 (H) 70 - 99 mg/dL   Glucose by meter    Collection Time: 01/16/18  2:03 AM   Result Value Ref Range    Glucose 116 (H) 70 - 99 mg/dL   Glucose by meter    Collection Time: 01/16/18  8:14 AM   Result Value Ref Range    Glucose 129 (H) 70 - 99 mg/dL          Psychiatric Examination:     /63  Pulse 70  Temp 96.6  F (35.9  C) (Oral)  Resp 16  Ht 1.527 m (5' 0.1\")  Wt 99.5 kg (219 lb 6.4 oz)  LMP 01/06/2015  SpO2 97%  BMI 42.71 kg/m2  Weight is 219 lbs 6.4 oz  Body mass index is 42.71 kg/(m^2).                Sitting Orthostatic BP: 133/63      Sitting Orthostatic Pulse: 70 bpm      Standing Orthostatic BP: 113/55      Standing Orthostatic Pulse: 80 bpm       Weight over time:  Vitals:    01/14/18 0558 01/16/18 0852   Weight: 100.2 kg (221 lb) 99.5 kg (219 lb 6.4 oz)       Orthostatic Vitals       Most Recent      Sitting Orthostatic /63 01/16 0852    Sitting Orthostatic Pulse (bpm) 70 01/16 0852    Standing Orthostatic /55 01/16 0852    " Standing Orthostatic Pulse (bpm) 80 01/16 0852            Nena 57-year-old female wearing hospital scrubs and is obese.  Her speech is simplistic and monotone without other abnormalities.  Her behavior is appropriative though she does have some tardive dyskinesia type movements of both her legs and her mouth.  Her affect is smiling.  Her mood she describes as better.  Her thought content consists of the above without thoughts of harming herself or others or current delusions.  Her thought process is concrete without looseness of association.  She does have abnormal perceptions currently.  Her attention and concentration is limited.  Her cognition and fund of knowledge is below average.  Her long-term/short-term/remote memory appears limited.  Her insight and judgment are both limited.         Precautions:     Behavioral Orders   Procedures     Code 1 - Restrict to Unit     Fall precautions     Routine Programming     As clinically indicated     Status 15     Every 15 minutes.     Suicide precautions          DIagnoses:     Schizoaffective disorder bipolar type most recently depressed  Alcohol use disorder in full remission  Cocaine use disorder in full remission         Assessment & Plan:     Nena appears to have improved mood and symptomology based on her use of her CPAP machine.  She has previously been urged to use the machine and we discussed in detail why it is important to have proper sleep.  She describes an understanding of this and will continue it when she is discharged from the hospital.    Continue voluntary hospitalization  Likely discharge on Friday    The risks, benefits, alternatives and side effects have been discussed and are understood by the patient and other caregivers.      Cristian Joseph  [facility]iat[facility]      The following document has been created with voice recognition software and may contain unintentional word substitutions.

## 2018-01-17 LAB
GLUCOSE BLDC GLUCOMTR-MCNC: 131 MG/DL (ref 70–99)
GLUCOSE BLDC GLUCOMTR-MCNC: 133 MG/DL (ref 70–99)
GLUCOSE BLDC GLUCOMTR-MCNC: 149 MG/DL (ref 70–99)
GLUCOSE BLDC GLUCOMTR-MCNC: 155 MG/DL (ref 70–99)
GLUCOSE BLDC GLUCOMTR-MCNC: 157 MG/DL (ref 70–99)

## 2018-01-17 PROCEDURE — 90853 GROUP PSYCHOTHERAPY: CPT

## 2018-01-17 PROCEDURE — A9270 NON-COVERED ITEM OR SERVICE: HCPCS | Mod: GY | Performed by: PSYCHIATRY & NEUROLOGY

## 2018-01-17 PROCEDURE — 99232 SBSQ HOSP IP/OBS MODERATE 35: CPT | Performed by: PSYCHIATRY & NEUROLOGY

## 2018-01-17 PROCEDURE — A9270 NON-COVERED ITEM OR SERVICE: HCPCS | Mod: GY | Performed by: PHYSICIAN ASSISTANT

## 2018-01-17 PROCEDURE — 00000146 ZZHCL STATISTIC GLUCOSE BY METER IP

## 2018-01-17 PROCEDURE — 12400007 ZZH R&B MH INTERMEDIATE UMMC

## 2018-01-17 PROCEDURE — 25000132 ZZH RX MED GY IP 250 OP 250 PS 637: Mod: GY | Performed by: PHYSICIAN ASSISTANT

## 2018-01-17 PROCEDURE — 25000132 ZZH RX MED GY IP 250 OP 250 PS 637: Mod: GY | Performed by: PSYCHIATRY & NEUROLOGY

## 2018-01-17 RX ORDER — HALOPERIDOL 5 MG/1
5 TABLET ORAL 2 TIMES DAILY
Qty: 60 TABLET | Refills: 0 | Status: SHIPPED | OUTPATIENT
Start: 2018-01-17 | End: 2018-02-05 | Stop reason: SINTOL

## 2018-01-17 RX ORDER — AMANTADINE HYDROCHLORIDE 100 MG/1
100 CAPSULE, GELATIN COATED ORAL 2 TIMES DAILY
Qty: 60 CAPSULE | Refills: 0 | Status: SHIPPED | OUTPATIENT
Start: 2018-01-17 | End: 2018-02-15

## 2018-01-17 RX ADMIN — GABAPENTIN 600 MG: 300 CAPSULE ORAL at 14:07

## 2018-01-17 RX ADMIN — AMANTADINE HYDROCHLORIDE 100 MG: 100 CAPSULE ORAL at 08:26

## 2018-01-17 RX ADMIN — ASPIRIN 81 MG: 81 TABLET, COATED ORAL at 08:26

## 2018-01-17 RX ADMIN — INSULIN ASPART 20 UNITS: 100 INJECTION, SOLUTION INTRAVENOUS; SUBCUTANEOUS at 13:10

## 2018-01-17 RX ADMIN — RANITIDINE 300 MG: 150 TABLET, FILM COATED ORAL at 21:06

## 2018-01-17 RX ADMIN — INSULIN ASPART 20 UNITS: 100 INJECTION, SOLUTION INTRAVENOUS; SUBCUTANEOUS at 18:02

## 2018-01-17 RX ADMIN — AMANTADINE HYDROCHLORIDE 100 MG: 100 CAPSULE ORAL at 21:06

## 2018-01-17 RX ADMIN — INSULIN ASPART 20 UNITS: 100 INJECTION, SOLUTION INTRAVENOUS; SUBCUTANEOUS at 08:28

## 2018-01-17 RX ADMIN — ATORVASTATIN CALCIUM 80 MG: 80 TABLET, FILM COATED ORAL at 08:25

## 2018-01-17 RX ADMIN — DIPHENHYDRAMINE HYDROCHLORIDE 25 MG: 25 CAPSULE ORAL at 21:06

## 2018-01-17 RX ADMIN — HALOPERIDOL 5 MG: 5 TABLET ORAL at 21:06

## 2018-01-17 RX ADMIN — Medication 5 MG: at 21:06

## 2018-01-17 RX ADMIN — INSULIN GLARGINE 45 UNITS: 100 INJECTION, SOLUTION SUBCUTANEOUS at 21:08

## 2018-01-17 RX ADMIN — ACETAMINOPHEN 1000 MG: 500 TABLET ORAL at 16:40

## 2018-01-17 RX ADMIN — HALOPERIDOL 5 MG: 5 TABLET ORAL at 08:26

## 2018-01-17 RX ADMIN — GABAPENTIN 600 MG: 300 CAPSULE ORAL at 08:26

## 2018-01-17 RX ADMIN — GABAPENTIN 600 MG: 300 CAPSULE ORAL at 21:06

## 2018-01-17 ASSESSMENT — ACTIVITIES OF DAILY LIVING (ADL)
HYGIENE/GROOMING: INDEPENDENT
LAUNDRY: WITH SUPERVISION
DRESS: SCRUBS (BEHAVIORAL HEALTH)
ORAL_HYGIENE: INDEPENDENT
LAUNDRY: WITH SUPERVISION
ORAL_HYGIENE: INDEPENDENT
GROOMING: INDEPENDENT
DRESS: INDEPENDENT

## 2018-01-17 NOTE — PROGRESS NOTES
"Pt was visible in the milieu during most of this shift and attended groups. Pt denies SI/SIB, rates depression 5/10, and rates anxiety 5/10 today. Pt reports having auditory hallucinations, \"They just tell me to hurt myself.\" Pt states that this is not new for her and reports, \"I feel pretty good, stable.\" Pt states that she may discharge on Friday, and that she looks forward to this and has no concerns at this time.       01/17/18 1255   Behavioral Health   Hallucinations auditory   Thinking distractable   Orientation person: oriented;place: oriented   Memory baseline memory   Insight poor   Judgement impaired   Eye Contact at examiner   Affect full range affect   Mood mood is calm   Physical Appearance/Attire appears stated age;attire appropriate to age and situation   Suicidality other (see comments)  (Denies)   1. Wish to be Dead No   2. Non-Specific Active Suicidal Thoughts  No   Self Injury other (see comment)  (Denies)   Elopement (None stated, none observed)   Activity other (see comment)  (Visible in the milieu, attending group)   Speech clear;coherent   Medication Sensitivity other (see comment)  (\"Shakiness\")   Psychomotor / Gait balanced;steady   Psycho Education   Type of Intervention 1:1 intervention   Response participates, initiates socially appropriate   Hours 0.5   Treatment Detail (Warning Signs, MH Check-in)   Activities of Daily Living   Hygiene/Grooming independent   Oral Hygiene independent   Dress scrubs (behavioral health)   Laundry with supervision   Room Organization independent   Behavioral Health Interventions   Depression maintain safety precautions;monitor need to revise level of observation;maintain safe secure environment;provide emotional support;establish therapeutic relationship   Social and Therapeutic Interventions (Depression) encourage socialization with peers;encourage effective boundaries with peers;encourage participation in therapeutic groups and milieu activities     "

## 2018-01-17 NOTE — PLAN OF CARE
"Problem: Depressive Symptoms  Goal: Depressive Symptoms  Signs and symptoms of listed problems will be absent or manageable. Signs and symptoms of listed problems will be absent or manageable  Pt will report reduction in depression and anxiety  Pt will deny suicidal and self harm thoughts  Pt will report improved sleep as evidence by less difficulty falling asleep and staying asleep.Signs and symptoms of listed problems will be absent or manageable  Pt will report reduction in depression and anxiety  Pt will deny suicidal and self harm thoughts  Pt will report improved sleep as evidence by less difficulty falling asleep and staying asleep.   Outcome: No Change  Pt's goal was to \"don't hear the voices\"  Pt plans to discharge back to her group home when ready.  Pt participated in group.  Rates depression 6/10 and anxiety 6/10.  Pt states she has auditory hallucinations, the voices are saying \"mean things\"  Pt states that watching TV helps with the voices.  Concentration is \"a little off\"  Pt states she has racing thoughts. Pt denies SI and SIB.  Pt ate 100% of supper tonight. Pt has chronic lower back pain and was given PRN tylenol for it.  Pt states she has been sleeping well since using the CPAP.  Pt denies side effects to medications.       "

## 2018-01-17 NOTE — PROGRESS NOTES
[facility], [place]   Psychiatric Progress Note  Nena Tang  [id number]  01/17/18    Chief Complaint: Continued medical care          Interim History:   The patient's care was discussed with the treatment team during the daily team meeting and/or staff's chart notes were reviewed.  Staff report patient has been sleeping better now that she is using her CPAP and has improvement in her auditory hallucinations and slept about 8 hours going to group activities.  Her blood sugar was elevated at 155.    Ania reports that she continues to feel better and she has had more energy has been more active.  She has been up on the unit going to groups and feels as though her sleep is much better while using her CPAP.  She is planning on using it when she goes back to the facility that she came from.  She denies any thoughts of harming herself or others or any hopelessness or helplessness or attention or concentration issues or appetite troubles or any paranoia.  She does still have some auditory hallucinations though she is able to ignore them and they are not as bothersome and she has not experienced any side effects from her medications.  We discussed her possibility of possibly discharging on Friday.         Medications:       aspirin  81 mg Oral Daily     atorvastatin  80 mg Oral Daily     clotrimazole   Topical BID     gabapentin  600 mg Oral TID     haloperidol  5 mg Oral BID     insulin aspart  20 Units Subcutaneous TID w/meals     insulin glargine  45 Units Subcutaneous At Bedtime     polyethylene glycol  17 g Oral Daily     ranitidine  300 mg Oral At Bedtime     metoprolol succinate  100 mg Oral Daily     melatonin  5 mg Oral At Bedtime     vitamin D3  50,000 Units Oral Q7 Days     [START ON 1/20/2018] dulaglutide  1.5 mg Subcutaneous Q7 Days     pneumococcal vaccine  0.5 mL Intramuscular Prior to discharge     losartan  100 mg Oral Daily     chlorthalidone  25 mg Oral Daily      "amantadine  100 mg Oral BID          Allergies:     Allergies   Allergen Reactions     Imidazole Antifungals Hives     Tolerates diflucan     Ketoprofen Itching     Pruritis to topical     Lisinopril Hives     Metformin Other (See Comments)     Patient hospitalized for lactic acidosis - admitting provider suspectd caused by metformin     Metronidazole Hives     Posaconazole Hives     Tolerates diflucan          Labs:     Recent Results (from the past 24 hour(s))   Glucose by meter    Collection Time: 01/16/18  5:07 PM   Result Value Ref Range    Glucose 106 (H) 70 - 99 mg/dL   Glucose by meter    Collection Time: 01/16/18  8:25 PM   Result Value Ref Range    Glucose 165 (H) 70 - 99 mg/dL   Glucose by meter    Collection Time: 01/17/18  2:16 AM   Result Value Ref Range    Glucose 155 (H) 70 - 99 mg/dL   Glucose by meter    Collection Time: 01/17/18  8:08 AM   Result Value Ref Range    Glucose 131 (H) 70 - 99 mg/dL   Glucose by meter    Collection Time: 01/17/18 12:13 PM   Result Value Ref Range    Glucose 133 (H) 70 - 99 mg/dL          Psychiatric Examination:     /49  Pulse 84  Temp 97.4  F (36.3  C) (Oral)  Resp 16  Ht 1.527 m (5' 0.1\")  Wt 99.5 kg (219 lb 6.4 oz)  LMP 01/06/2015  SpO2 97%  BMI 42.71 kg/m2  Weight is 219 lbs 6.4 oz  Body mass index is 42.71 kg/(m^2).                Sitting Orthostatic BP: 121/61      Sitting Orthostatic Pulse: 85 bpm      Standing Orthostatic BP: 99/66      Standing Orthostatic Pulse: 88 bpm       Weight over time:  Vitals:    01/14/18 0558 01/16/18 0852   Weight: 100.2 kg (221 lb) 99.5 kg (219 lb 6.4 oz)       Orthostatic Vitals       Most Recent      Sitting Orthostatic /61 01/17 1222    Sitting Orthostatic Pulse (bpm) 85 01/17 1222    Standing Orthostatic BP 99/66 01/17 1222    Standing Orthostatic Pulse (bpm) 88 01/17 1222            Nena 57-year-old female wearing hospital scrubs and is obese.  Her speech is simplistic and monotone without other " abnormalities.  Her behavior is appropriative though she does have some tardive dyskinesia type movements of both her legs and her mouth.  Her affect is smiling.  Her mood she describes as better.  Her thought content consists of the above without thoughts of harming herself or others or current delusions.  Her thought process is concrete without looseness of association.  She does have abnormal perceptions currently.  Her attention and concentration is limited.  Her cognition and fund of knowledge is below average.  Her long-term/short-term/remote memory appears limited.  Her insight and judgment are both limited.         Precautions:     Behavioral Orders   Procedures     Code 1 - Restrict to Unit     Fall precautions     Routine Programming     As clinically indicated     Status 15     Every 15 minutes.     Suicide precautions          DIagnoses:     Schizoaffective disorder bipolar type most recently depressed  Alcohol use disorder in full remission  Cocaine use disorder in full remission         Assessment & Plan:     Ania continues to do well now that she is using her CPAP machine only medication changes that have occurred during this hospitalization or addition of amantadine, and scheduling haloperidol 5 mg twice daily though she does have some tardive dyskinesia but that this could potentially worsen.  We are preparing her for potential discharge on Friday and I have sent her medications.    Continue voluntary hospitalization  Likely discharge on Friday    The risks, benefits, alternatives and side effects have been discussed and are understood by the patient and other caregivers.      Cristian Joseph  [facility] Psychiatry      The following document has been created with voice recognition software and may contain unintentional word substitutions.

## 2018-01-18 LAB
GLUCOSE BLDC GLUCOMTR-MCNC: 117 MG/DL (ref 70–99)
GLUCOSE BLDC GLUCOMTR-MCNC: 150 MG/DL (ref 70–99)
GLUCOSE BLDC GLUCOMTR-MCNC: 156 MG/DL (ref 70–99)
GLUCOSE BLDC GLUCOMTR-MCNC: 165 MG/DL (ref 70–99)
GLUCOSE BLDC GLUCOMTR-MCNC: 174 MG/DL (ref 70–99)

## 2018-01-18 PROCEDURE — A9270 NON-COVERED ITEM OR SERVICE: HCPCS | Mod: GY | Performed by: PSYCHIATRY & NEUROLOGY

## 2018-01-18 PROCEDURE — 25000132 ZZH RX MED GY IP 250 OP 250 PS 637: Mod: GY | Performed by: PSYCHIATRY & NEUROLOGY

## 2018-01-18 PROCEDURE — A9270 NON-COVERED ITEM OR SERVICE: HCPCS | Mod: GY | Performed by: PHYSICIAN ASSISTANT

## 2018-01-18 PROCEDURE — 90853 GROUP PSYCHOTHERAPY: CPT

## 2018-01-18 PROCEDURE — 25000132 ZZH RX MED GY IP 250 OP 250 PS 637: Mod: GY | Performed by: PHYSICIAN ASSISTANT

## 2018-01-18 PROCEDURE — 12400007 ZZH R&B MH INTERMEDIATE UMMC

## 2018-01-18 PROCEDURE — 00000146 ZZHCL STATISTIC GLUCOSE BY METER IP

## 2018-01-18 RX ADMIN — HALOPERIDOL 5 MG: 5 TABLET ORAL at 20:48

## 2018-01-18 RX ADMIN — RANITIDINE 300 MG: 150 TABLET, FILM COATED ORAL at 20:47

## 2018-01-18 RX ADMIN — AMANTADINE HYDROCHLORIDE 100 MG: 100 CAPSULE ORAL at 20:48

## 2018-01-18 RX ADMIN — GABAPENTIN 600 MG: 300 CAPSULE ORAL at 08:49

## 2018-01-18 RX ADMIN — INSULIN GLARGINE 45 UNITS: 100 INJECTION, SOLUTION SUBCUTANEOUS at 20:51

## 2018-01-18 RX ADMIN — POLYETHYLENE GLYCOL 3350 17 G: 17 POWDER, FOR SOLUTION ORAL at 08:48

## 2018-01-18 RX ADMIN — ATORVASTATIN CALCIUM 80 MG: 80 TABLET, FILM COATED ORAL at 08:49

## 2018-01-18 RX ADMIN — CHLORTHALIDONE 25 MG: 25 TABLET ORAL at 08:49

## 2018-01-18 RX ADMIN — Medication 5 MG: at 20:48

## 2018-01-18 RX ADMIN — DIPHENHYDRAMINE HYDROCHLORIDE 25 MG: 25 CAPSULE ORAL at 20:48

## 2018-01-18 RX ADMIN — ACETAMINOPHEN 1000 MG: 500 TABLET ORAL at 14:56

## 2018-01-18 RX ADMIN — METOPROLOL SUCCINATE 100 MG: 100 TABLET, EXTENDED RELEASE ORAL at 08:49

## 2018-01-18 RX ADMIN — INSULIN ASPART 20 UNITS: 100 INJECTION, SOLUTION INTRAVENOUS; SUBCUTANEOUS at 13:08

## 2018-01-18 RX ADMIN — AMANTADINE HYDROCHLORIDE 100 MG: 100 CAPSULE ORAL at 08:49

## 2018-01-18 RX ADMIN — SENNOSIDES AND DOCUSATE SODIUM 1 TABLET: 8.6; 5 TABLET ORAL at 08:54

## 2018-01-18 RX ADMIN — LOSARTAN POTASSIUM 100 MG: 50 TABLET, FILM COATED ORAL at 08:50

## 2018-01-18 RX ADMIN — ASPIRIN 81 MG: 81 TABLET, COATED ORAL at 08:49

## 2018-01-18 RX ADMIN — GABAPENTIN 600 MG: 300 CAPSULE ORAL at 14:22

## 2018-01-18 RX ADMIN — GABAPENTIN 600 MG: 300 CAPSULE ORAL at 20:47

## 2018-01-18 RX ADMIN — HALOPERIDOL 5 MG: 5 TABLET ORAL at 08:49

## 2018-01-18 RX ADMIN — INSULIN ASPART 20 UNITS: 100 INJECTION, SOLUTION INTRAVENOUS; SUBCUTANEOUS at 08:49

## 2018-01-18 RX ADMIN — INSULIN ASPART 20 UNITS: 100 INJECTION, SOLUTION INTRAVENOUS; SUBCUTANEOUS at 18:12

## 2018-01-18 ASSESSMENT — ACTIVITIES OF DAILY LIVING (ADL)
LAUNDRY: WITH SUPERVISION
ORAL_HYGIENE: INDEPENDENT
GROOMING: INDEPENDENT
DRESS: INDEPENDENT

## 2018-01-18 NOTE — PROGRESS NOTES
"Brief Medicine Note    Contacted by nursing regarding low BP 1/17 and afternoon of 1/18. BP prior to med administration this afternoon 98/51 sitting, 85/50 standing.     Currently on Losartan 100 mg QD and Chlorthalidone 25 mg QD.    Will discontinue Chlorthalidone at this time and continue to monitor BP. If patient discharges, would recommend close follow up with group home provider for repeat BP readings to determine need for further adjustments in regimen.     Today's vital signs, medications, and nursing notes were reviewed.     /49  Pulse 84  Temp 97.2  F (36.2  C)  Resp 16  Ht 1.527 m (5' 0.1\")  Wt 99.5 kg (219 lb 4.8 oz)  LMP 01/06/2015  SpO2 97%  BMI 42.69 kg/m2      Pam Vang PA-C  Hospitalist Service  Pager [phone]    "

## 2018-01-18 NOTE — PROGRESS NOTES
Nena attended an OT Life Skills group this a.m. on the topic of coping with stressful situations. Voiced several relevant examples of stressful situations that she has encountered, and reflected upon various tactics for coping with those situations. Became teary when discussing the loss several years ago of her possessions which she had kept in a storage unit (she had been unable to continue paying the rent for the unit and thus assumes her possessions are no longer being stored there).

## 2018-01-19 VITALS
HEART RATE: 84 BPM | OXYGEN SATURATION: 97 % | BODY MASS INDEX: 43.05 KG/M2 | HEIGHT: 60 IN | RESPIRATION RATE: 18 BRPM | WEIGHT: 219.3 LBS | SYSTOLIC BLOOD PRESSURE: 113 MMHG | DIASTOLIC BLOOD PRESSURE: 49 MMHG | TEMPERATURE: 97.2 F

## 2018-01-19 LAB
GLUCOSE BLDC GLUCOMTR-MCNC: 127 MG/DL (ref 70–99)
GLUCOSE BLDC GLUCOMTR-MCNC: 151 MG/DL (ref 70–99)
GLUCOSE BLDC GLUCOMTR-MCNC: 158 MG/DL (ref 70–99)

## 2018-01-19 PROCEDURE — 25000132 ZZH RX MED GY IP 250 OP 250 PS 637: Mod: GY | Performed by: PSYCHIATRY & NEUROLOGY

## 2018-01-19 PROCEDURE — A9270 NON-COVERED ITEM OR SERVICE: HCPCS | Mod: GY | Performed by: PHYSICIAN ASSISTANT

## 2018-01-19 PROCEDURE — 25000131 ZZH RX MED GY IP 250 OP 636 PS 637: Mod: GY | Performed by: PHYSICIAN ASSISTANT

## 2018-01-19 PROCEDURE — A9270 NON-COVERED ITEM OR SERVICE: HCPCS | Mod: GY | Performed by: PSYCHIATRY & NEUROLOGY

## 2018-01-19 PROCEDURE — 00000146 ZZHCL STATISTIC GLUCOSE BY METER IP

## 2018-01-19 PROCEDURE — 99239 HOSP IP/OBS DSCHRG MGMT >30: CPT | Performed by: PSYCHIATRY & NEUROLOGY

## 2018-01-19 PROCEDURE — 97150 GROUP THERAPEUTIC PROCEDURES: CPT | Mod: GO

## 2018-01-19 PROCEDURE — 25000132 ZZH RX MED GY IP 250 OP 250 PS 637: Mod: GY | Performed by: PHYSICIAN ASSISTANT

## 2018-01-19 PROCEDURE — 90853 GROUP PSYCHOTHERAPY: CPT

## 2018-01-19 RX ADMIN — AMANTADINE HYDROCHLORIDE 100 MG: 100 CAPSULE ORAL at 08:59

## 2018-01-19 RX ADMIN — GABAPENTIN 600 MG: 300 CAPSULE ORAL at 08:59

## 2018-01-19 RX ADMIN — INSULIN ASPART 20 UNITS: 100 INJECTION, SOLUTION INTRAVENOUS; SUBCUTANEOUS at 12:57

## 2018-01-19 RX ADMIN — ATORVASTATIN CALCIUM 80 MG: 80 TABLET, FILM COATED ORAL at 08:59

## 2018-01-19 RX ADMIN — ASPIRIN 81 MG: 81 TABLET, COATED ORAL at 08:59

## 2018-01-19 RX ADMIN — LOSARTAN POTASSIUM 100 MG: 50 TABLET, FILM COATED ORAL at 08:59

## 2018-01-19 RX ADMIN — HALOPERIDOL 5 MG: 5 TABLET ORAL at 08:59

## 2018-01-19 RX ADMIN — INSULIN ASPART 20 UNITS: 100 INJECTION, SOLUTION INTRAVENOUS; SUBCUTANEOUS at 09:01

## 2018-01-19 ASSESSMENT — ACTIVITIES OF DAILY LIVING (ADL)
LAUNDRY: WITH SUPERVISION
ORAL_HYGIENE: INDEPENDENT
DRESS: INDEPENDENT
GROOMING: INDEPENDENT

## 2018-01-19 NOTE — DISCHARGE SUMMARY
[facility], [place]   Psychiatric Discharge Summary  Time: 37 minutes on encounter.    Nena Tang MRN# [id number]   Age: 57 year old YOB: 1960     Date of Admission:  1/14/2018  Date of Discharge:  01/19/18  Admitting Physician:  Cristian Avalos  Discharge Physician:  Cristian Avalos         Event Leading to Hospitalization and Hospital Course:   Nena Tang was admitted for suicidal ideation.       See Admission note by Devang on 1/14 for additional details.     Nena Tang was admitted on a 72 hour hold transition to [facility] for suicidal ideation and possible side effects on Ingrezza which is a new medication for tardive dyskinesia.  She is potentially having worsening tremors and dizziness.  This medication was discontinued and she was started on amantadine 100 mg twice daily for parkinsonian symptoms and possibly for abnormal movements.  Her sertraline was discontinued for lack of benefits and efficacy.  She began using her CPAP machine while on the unit and was sleeping more appropriately at night and feeling more rested her energy subsequently increased and so does her concentration and her suicidal thoughts subsided.  She has not been using her CPAP machine while at the facility for some reason.  She states that it needs to be adjusted on her mask.  On day of discharge she feels better still having some auditory hallucinations though much improved and she is able to ignore it.  The medicine service would like her to have her blood pressure reevaluated in the outpatient setting and her blood sugar was slightly elevated at 151.  She is not hopeless or helpless not having any thoughts of harming herself or others sleeping well at night not paranoid about others and has a safety plan after her discharge.  She did have some constipation during her hospitalization and had a good bowel movement yesterday.           DIagnoses:   Schizoaffective  disorder bipolar type most recently depressed  Alcohol use disorder in full remission  Cocaine use disorder in full remission         Labs:     Recent Results (from the past 24 hour(s))   Glucose by meter    Collection Time: 01/18/18 12:13 PM   Result Value Ref Range    Glucose 150 (H) 70 - 99 mg/dL   Glucose by meter    Collection Time: 01/18/18  5:31 PM   Result Value Ref Range    Glucose 165 (H) 70 - 99 mg/dL   Glucose by meter    Collection Time: 01/18/18  8:51 PM   Result Value Ref Range    Glucose 174 (H) 70 - 99 mg/dL   Glucose by meter    Collection Time: 01/19/18  2:53 AM   Result Value Ref Range    Glucose 151 (H) 70 - 99 mg/dL   Glucose by meter    Collection Time: 01/19/18  8:27 AM   Result Value Ref Range    Glucose 127 (H) 70 - 99 mg/dL            Consults:   Consultation during this admission received from internal medicine    She was essentially continued on her general medications though they would like her to follow-up on her blood pressure in the outpatient setting as she had some reduced blood pressure as well on the unit.         Discharge Medications:        Review of your medicines      START taking       Dose / Directions    amantadine 100 MG capsule   Commonly known as:  SYMMETREL   Used for:  Schizoaffective disorder, depressive type (H), Tardive dyskinesia        Dose:  100 mg   Take 1 capsule (100 mg) by mouth 2 times daily   Quantity:  60 capsule   Refills:  0         CONTINUE these medicines which may have CHANGED, or have new prescriptions. If we are uncertain of the size of tablets/capsules you have at home, strength may be listed as something that might have changed.       Dose / Directions    * blood glucose monitoring test strip   Commonly known as:  ONETOUCH VERIO IQ   This may have changed:  Another medication with the same name was changed. Make sure you understand how and when to take each.   Used for:  Type 2 diabetes mellitus with diabetic neuropathy, with long-term current  use of insulin (H)        Use to test blood sugar 2 times daily or as directed.  Ok to substitute alternative if insurance prefers.   Quantity:  150 strip   Refills:  11       * ONETOUCH ULTRA test strip   This may have changed:  See the new instructions.   Used for:  Type 2 diabetes mellitus with diabetic neuropathy, with long-term current use of insulin (H)   Generic drug:  blood glucose monitoring        TEST TWICE DAILY   Quantity:  175 strip   Refills:  3       insulin glargine 100 UNIT/ML injection   Commonly known as:  LANTUS   This may have changed:  additional instructions   Used for:  Type 2 diabetes mellitus with mild nonproliferative retinopathy without macular edema, with long-term current use of insulin, unspecified laterality (H)        Dose:  45 Units   Inject 45 Units Subcutaneous At Bedtime   Quantity:  3 mL   Refills:  11       melatonin 5 MG Caps   This may have changed:    - when to take this  - additional instructions   Used for:  Insomnia, unspecified type        Dose:  1 capsule   Take 1 capsule by mouth daily At dinnertime   Quantity:  90 capsule   Refills:  1       NOVOFINE 30 30G X 8 MM   This may have changed:  See the new instructions.   Used for:  Type 2 diabetes mellitus with mild nonproliferative retinopathy without macular edema, with long-term current use of insulin, unspecified laterality (H)   Generic drug:  insulin pen needle        USE 4 DAILY OR AS DIRECTED   Quantity:  100 each   Refills:  1       vitamin D3 38100 UNITS capsule   Commonly known as:  CHOLECALCIFEROL   This may have changed:  See the new instructions.   Used for:  Vitamin D deficiency        TAKE 1 CAPSULE (50,000 UNITS) BY MOUTH ONCE A WEEK   Quantity:  4 capsule   Refills:  3       * Notice:  This list has 2 medication(s) that are the same as other medications prescribed for you. Read the directions carefully, and ask your doctor or other care provider to review them with you.      CONTINUE these medicines  which have NOT CHANGED       Dose / Directions    ACETAMINOPHEN PO        Dose:  1000 mg   Take 1,000 mg by mouth every 6 hours as needed for pain or fever   Refills:  0       albuterol 108 (90 BASE) MCG/ACT Inhaler   Commonly known as:  PROAIR HFA/PROVENTIL HFA/VENTOLIN HFA   Used for:  Dyspnea on exertion        Dose:  2 puff   Inhale 2 puffs into the lungs every 6 hours as needed for shortness of breath / dyspnea or wheezing   Quantity:  3 Inhaler   Refills:  1       aspirin 81 MG EC tablet   Commonly known as:  ASPIRIN LOW DOSE   Used for:  Coronary artery disease involving native heart with angina pectoris, unspecified vessel or lesion type (H)        Dose:  81 mg   Take 1 tablet (81 mg) by mouth daily   Quantity:  100 tablet   Refills:  4       atorvastatin 80 MG tablet   Commonly known as:  LIPITOR   Used for:  Hyperlipidemia LDL goal <100        TAKE 1 TABLET (80MG) BY MOUTH DAILY   Quantity:  28 tablet   Refills:  11       blood glucose monitoring lancets   Used for:  Type 2 diabetes mellitus with diabetic neuropathy, with long-term current use of insulin (H)        Use to test blood sugar 2 times daily or as directed.  Ok to substitute alternative if insurance prefers.   Quantity:  150 each   Refills:  11       blood glucose monitoring meter device kit   Used for:  Type 2 diabetes mellitus with diabetic neuropathy, with long-term current use of insulin (H)        Use to test blood sugar 2 times daily or as directed.  Ok to substitute alternative if insurance prefers.   Quantity:  1 kit   Refills:  0       chlorthalidone 25 MG tablet   Commonly known as:  HYGROTON   Used for:  HTN, goal below 140/90        Dose:  25 mg   Take 1 tablet (25 mg) by mouth daily   Quantity:  30 tablet   Refills:  3       clotrimazole 1 % cream   Commonly known as:  LOTRIMIN        Apply topically 2 times daily   Refills:  0       DIPHENDRYL PO        Dose:  25 mg   Take 25 mg by mouth every 6 hours as needed for itching    Refills:  0       gabapentin 300 MG capsule   Commonly known as:  NEURONTIN   Used for:  Type 2 diabetes mellitus with diabetic neuropathy, with long-term current use of insulin (H)        TAKE 2 CAPSULES (600MG) BY MOUTH THREE TIMES A DAY   Quantity:  168 capsule   Refills:  3       glucose 4 G Chew chewable tablet   Used for:  Type 2 diabetes mellitus with mild nonproliferative retinopathy without macular edema, with long-term current use of insulin, unspecified laterality (H)        Take 2 every 15 minutes for blood sugar <70mg/dL. Recheck blood sugar every 15 minutes until above 70mg/dL, then eat a substantial meal.   Quantity:  20 tablet   Refills:  1       * HALOPERIDOL PO        Dose:  5 mg   Take 5 mg by mouth every 12 hours as needed for agitation   Refills:  0       * haloperidol 5 MG tablet   Commonly known as:  HALDOL   Used for:  Schizoaffective disorder, depressive type (H)        Dose:  5 mg   Take 1 tablet (5 mg) by mouth 2 times daily   Quantity:  60 tablet   Refills:  0       ibuprofen 600 MG tablet   Commonly known as:  ADVIL/MOTRIN   Used for:  Closed fracture of one rib of right side, initial encounter        Dose:  600 mg   Take 1 tablet (600 mg) by mouth every 4 hours as needed for moderate pain   Quantity:  60 tablet   Refills:  0       insulin aspart 100 UNIT/ML injection   Commonly known as:  NovoLOG FLEXPEN   Used for:  Type 2 diabetes mellitus with mild nonproliferative retinopathy without macular edema, with long-term current use of insulin, unspecified laterality (H)        Dose:  20 Units   Inject 20 Units Subcutaneous 3 times daily (with meals) Once daily, can add additional 5 units if BGs are >500mg/dL.   Quantity:  15 mL   Refills:  11       losartan 100 MG tablet   Commonly known as:  COZAAR   Used for:  Coronary artery disease involving native heart with angina pectoris, unspecified vessel or lesion type (H)        TAKE 1 TABLET (100MG) BY MOUTH DAILY   Quantity:  28 tablet    Refills:  3       metoprolol succinate 100 MG 24 hr tablet   Commonly known as:  TOPROL-XL   Used for:  Coronary artery disease involving native heart with angina pectoris, unspecified vessel or lesion type (H)        TAKE 1 TABLET (100MG) BY MOUTH DAILY   Quantity:  28 tablet   Refills:  11       * order for DME   Used for:  Falls frequently        Equipment being ordered: Rolling walker   Quantity:  1 Device   Refills:  0       * order for DME   Used for:  Type 2 diabetes mellitus with mild nonproliferative retinopathy without macular edema, with long-term current use of insulin, unspecified laterality (H)        Hold Novolog if meals are refused.   Quantity:  1 each   Refills:  0       * order for DME   Used for:  Type 2 diabetes mellitus with mild nonproliferative retinopathy without macular edema, with long-term current use of insulin, unspecified laterality (H)        Diabetic Shoes   Quantity:  2 each   Refills:  0       polyethylene glycol powder   Commonly known as:  MIRALAX/GLYCOLAX   Used for:  Constipation, unspecified constipation type        TAKE 17 GRAMS (1 CAPFUL) BY MOUTH DAILY   Quantity:  527 g   Refills:  3       prochlorperazine 5 MG tablet   Commonly known as:  COMPAZINE   Used for:  Nausea        Dose:  5 mg   Take 1 tablet (5 mg) by mouth every 6 hours as needed for nausea or vomiting   Quantity:  90 tablet   Refills:  0       ranitidine 300 MG tablet   Commonly known as:  ZANTAC   Used for:  Gastroesophageal reflux disease without esophagitis        TAKE 1 TABLET (300MG) BY MOUTH AT BEDTIME   Quantity:  90 tablet   Refills:  1       senna-docusate 8.6-50 MG per tablet   Commonly known as:  SENOKOT-S;PERICOLACE        Dose:  1 tablet   Take 1 tablet by mouth 2 times daily as needed for constipation   Refills:  0       TRULICITY 1.5 MG/0.5ML pen   Used for:  Type 2 diabetes mellitus with mild nonproliferative retinopathy without macular edema, with long-term current use of insulin,  unspecified laterality (H)   Generic drug:  dulaglutide        INJECT 1.5MG SUBCUTANEOUSLY EVERY SEVEN DAYS   Quantity:  2 mL   Refills:  11       * Notice:  This list has 5 medication(s) that are the same as other medications prescribed for you. Read the directions carefully, and ask your doctor or other care provider to review them with you.      STOP taking          INGREZZA 40 MG capsule   Generic drug:  valbenazine           SERTRALINE HCL PO                Where to get your medicines      These medications were sent to [address] [address]     Phone:  [phone]      amantadine 100 MG capsule     haloperidol 5 MG tablet     NOVOFINE 30 30G X 8 MM         These medications were sent to [facility] [address] [place] [address] [address] [facility] [address]  [address] [facility] [address]     Phone:  [phone]      ONETOUCH ULTRA test strip                  Mental Status Examination:   Nena 57-year-old female wearing hospital scrubs and is obese.  Her speech is simplistic and monotone without other abnormalities.  Her behavior is appropriative though she does have some tardive dyskinesia type movements of both her legs and her mouth.  Her affect is smiling.  Her mood she describes as better.  Her thought content consists of the above without thoughts of harming herself or others or current delusions.  Her thought process is concrete without looseness of association.  She does have abnormal perceptions currently.  Her attention and concentration is limited.  Her cognition and fund of knowledge is below average.  Her long-term/short-term/remote memory appears limited.  Her insight and judgment are both limited.         Discharge Plan:     Follow Up ([facility])   Follow up with primary care provider, Rowena Haas, within 7 days for hospital follow- up.  The following  labs/tests are recommended: BMP in the setting of recent KATY.      Appointments on [place] and/or [facility] (with [facility] or [facility] provider or service). Call [phone] if you haven't heard regarding these appointments within 7 days of discharge.     Reason for your hospital stay   Schizoaffective disorder and not using CPAP     Activity   Your activity upon discharge: activity as tolerated     Discharge Instructions   1. Please do not harm yourself or others.  2. Please continue to take your medications.  3. Please follow up with your outpatient care team.  4. Please do not take drugs or alcohol as this will worsen your condition.  5. Please do not take more than the prescribed doses of medications as this may make them dangerous.   6. Please follow your safety plan of action.  7. Please call crisis if having trouble.  8. If having thoughts of harming self or others please come in to the emergency department as soon as possible.     Full Code     Diet   Follow this diet upon discharge: Orders Placed This Encounter     Snacks/Supplements Adult: Other - Please comment; 1/2 turkey sandwich with king, lettuce, tomato, cheese @2 pm; hardboiled eggs x2 and fresh fruit at 8 pm; Between Meals     Regular Diet Adult             Further instructions from your care team        Behavioral Discharge Planning and Instructions      Summary:  You were admitted on 1/14/2018  due to decompensation and needing medication adjustments.  You were treated by Dr. Cristian Avalos MD and discharged on  from [facility] to the [facility].       Principal Diagnosis: Schizoaffective      Health Care Follow-up Appointments:  Psychiatrist Hillary Winkelmann - Next Appt 1:20pm Tuesday, 1/23/18  [facility] & [place], [place]  [phone]  Fax [phone]    Therapist Liliana Miller (Group Home will schedule for her.)  [facility] [place] [facility] [place]  [address]  [phone]    PCP  Rowena CORDOVA [facility]     Attend all scheduled appointments with your outpatient providers. Call at least 24 hours in advance if you need to reschedule an appointment to ensure continued access to your outpatient providers.   Major Treatments, Procedures and Findings:  You were provided with: a psychiatric assessment, assessed for medical stability, medication evaluation and/or management, group therapy and milieu management    Symptoms to Report: increased confusion, losing more sleep, mood getting worse or thoughts of suicide    Early warning signs can include: increased depression or anxiety sleep disturbances increased thoughts or behaviors of suicide or self-harm  increased unusual thinking, such as paranoia or hearing voices    Safety and Wellness:  Take all medicines as directed.  Make no changes unless your doctor suggests them.      Follow treatment recommendations.  Refrain from alcohol and non-prescribed drugs.  If there is a concern for safety, call 911.    Resources:   Crisis Intervention: 375.799.2476 or [phone] (TTY: 741.127.9720).  Call anytime for help.  [place] [employer] [place] [employer]ness (www.[place].marah.org): 226.213.8931 or 146-631-5316.    The treatment team has appreciated the opportunity to work with you.     If you have any questions or concerns our unit number is [phone].          Please send copy to Hillary Winkelmann    During hospitalizations patients have perpetuating, complicating, situational, acute, and chronic conditions that lead to risk for suicidality or homicidality. During hospitalizations we mitigate any modifiable risk factors that may have been identified in the history and physical examination and throughout the hospitalization. Chronic non-modifiable risk factors are not able to be changed with acute hospitalization. As a patient that is discharging these risk factors have been modified as  much as possible and a supportive network and safety plan has been put in place. Further modifications and assistance will have to be obtained in the outpatient setting and the inpatient hospitalization team is no longer responsible for outcomes.    Cristian Joseph  [facility]      The following document has been created with voice recognition software and may contain unintentional word substitutions.

## 2018-01-19 NOTE — PROGRESS NOTES
Patient reviewed AVS with writer and was given a copy. Patient verbalizes understanding of all discharge instructions and follow up appointments. Patient denies suicidal ideation, plan, intent, and has plan to talk to group home staff or call crisis number or 911 if thoughts arise. All belongings returned to patient and medications sent to [facility] pharmacy. Group home staff to pick patient up at 1400. Patient is discharged at this time.

## 2018-01-19 NOTE — PROGRESS NOTES
The patient is being discharged back to her group home with staff coming to provide transport.  All medications were escripted to outpatient pharmacy.

## 2018-01-19 NOTE — PLAN OF CARE
"Problem: Depressive Symptoms  Goal: Depressive Symptoms  Signs and symptoms of listed problems will be absent or manageable. Signs and symptoms of listed problems will be absent or manageable  Pt will report reduction in depression and anxiety  Pt will deny suicidal and self harm thoughts  Pt will report improved sleep as evidence by less difficulty falling asleep and staying asleep.Signs and symptoms of listed problems will be absent or manageable  Pt will report reduction in depression and anxiety  Pt will deny suicidal and self harm thoughts  Pt will report improved sleep as evidence by less difficulty falling asleep and staying asleep.   Outcome: Improving    RN ASSESSMENT    Pt visible in milieu. Social with select peers. Appropriate interactions with staff. Pleasant, calm, cooperative. Affect blunted. This evening, pt denied depression, anxiety, SI/SIB. Did continue to endorse AH which she stated \"tell me to hurt myself.\" However, she stated they were less intense and less frequent than in recent days. Stated she has no intention of following through with those commands. States she is eating well, sleep is good with CPAP, hygiene appears adequate. Med-compliant. Compliant with BG monitoring and with insulin administration. Continue with current treatment plan and recommendations. Continue to monitor and reassess symptoms. Monitor response to medications. Monitor progress towards treatment goals. Encourage groups and participation.      "

## 2018-01-20 ENCOUNTER — HEALTH MAINTENANCE LETTER (OUTPATIENT)
Age: 58
End: 2018-01-20

## 2018-01-22 ENCOUNTER — TELEPHONE (OUTPATIENT)
Dept: FAMILY MEDICINE | Facility: CLINIC | Age: 58
End: 2018-01-22

## 2018-01-22 DIAGNOSIS — Z79.4 TYPE 2 DIABETES MELLITUS WITH DIABETIC NEUROPATHY, WITH LONG-TERM CURRENT USE OF INSULIN (H): ICD-10-CM

## 2018-01-22 DIAGNOSIS — E11.40 TYPE 2 DIABETES MELLITUS WITH DIABETIC NEUROPATHY, WITH LONG-TERM CURRENT USE OF INSULIN (H): ICD-10-CM

## 2018-01-22 NOTE — TELEPHONE ENCOUNTER
Reason for Call:  Teat Strips     Detailed comments: Kalie with pt's group home called, [facility] will not fill pt's Rx for Test Strips, please send order to phar listed below, and order need to say that pt check blood sugar 4 times day instead of twice daily.   [facility] in [place]  # [phone]    Phone Number Patient can be reached at: Other phone number:  Group home # [phone] - ask nieves Calle (Amy's boss)    Best Time: anytime    Can we leave a detailed message on this number? Not Applicable    Call taken on 1/22/2018 at 11:11 AM by Giorgi Case

## 2018-01-23 ENCOUNTER — OFFICE VISIT (OUTPATIENT)
Dept: PHARMACY | Facility: CLINIC | Age: 58
End: 2018-01-23
Payer: COMMERCIAL

## 2018-01-23 ENCOUNTER — OFFICE VISIT (OUTPATIENT)
Dept: FAMILY MEDICINE | Facility: CLINIC | Age: 58
End: 2018-01-23
Payer: MEDICARE

## 2018-01-23 ENCOUNTER — TRANSFERRED RECORDS (OUTPATIENT)
Dept: HEALTH INFORMATION MANAGEMENT | Facility: CLINIC | Age: 58
End: 2018-01-23

## 2018-01-23 ENCOUNTER — OFFICE VISIT (OUTPATIENT)
Dept: BEHAVIORAL HEALTH | Facility: CLINIC | Age: 58
End: 2018-01-23
Payer: MEDICARE

## 2018-01-23 ENCOUNTER — MEDICAL CORRESPONDENCE (OUTPATIENT)
Dept: HEALTH INFORMATION MANAGEMENT | Facility: CLINIC | Age: 58
End: 2018-01-23

## 2018-01-23 VITALS
SYSTOLIC BLOOD PRESSURE: 130 MMHG | OXYGEN SATURATION: 98 % | TEMPERATURE: 98.2 F | RESPIRATION RATE: 16 BRPM | WEIGHT: 226.5 LBS | BODY MASS INDEX: 44.09 KG/M2 | DIASTOLIC BLOOD PRESSURE: 80 MMHG | HEART RATE: 80 BPM

## 2018-01-23 DIAGNOSIS — F43.10 POSTTRAUMATIC STRESS DISORDER: ICD-10-CM

## 2018-01-23 DIAGNOSIS — E11.3299 TYPE 2 DIABETES MELLITUS WITH MILD NONPROLIFERATIVE RETINOPATHY WITHOUT MACULAR EDEMA, WITH LONG-TERM CURRENT USE OF INSULIN, UNSPECIFIED LATERALITY (H): ICD-10-CM

## 2018-01-23 DIAGNOSIS — F25.1 SCHIZOAFFECTIVE DISORDER, DEPRESSIVE TYPE (H): Primary | ICD-10-CM

## 2018-01-23 DIAGNOSIS — G47.00 INSOMNIA, UNSPECIFIED TYPE: ICD-10-CM

## 2018-01-23 DIAGNOSIS — K59.01 SLOW TRANSIT CONSTIPATION: ICD-10-CM

## 2018-01-23 DIAGNOSIS — Z79.4 TYPE 2 DIABETES MELLITUS WITH HYPERGLYCEMIA, WITH LONG-TERM CURRENT USE OF INSULIN (H): Primary | ICD-10-CM

## 2018-01-23 DIAGNOSIS — R45.851 SUICIDAL IDEATION: ICD-10-CM

## 2018-01-23 DIAGNOSIS — E63.9 NUTRITIONAL DEFICIENCY: ICD-10-CM

## 2018-01-23 DIAGNOSIS — F25.1 SCHIZOAFFECTIVE DISORDER, DEPRESSIVE TYPE (H): ICD-10-CM

## 2018-01-23 DIAGNOSIS — Z09 HOSPITAL DISCHARGE FOLLOW-UP: Primary | ICD-10-CM

## 2018-01-23 DIAGNOSIS — E11.65 TYPE 2 DIABETES MELLITUS WITH HYPERGLYCEMIA, WITH LONG-TERM CURRENT USE OF INSULIN (H): Primary | ICD-10-CM

## 2018-01-23 DIAGNOSIS — I10 HTN, GOAL BELOW 140/90: ICD-10-CM

## 2018-01-23 DIAGNOSIS — Z79.4 TYPE 2 DIABETES MELLITUS WITH MILD NONPROLIFERATIVE RETINOPATHY WITHOUT MACULAR EDEMA, WITH LONG-TERM CURRENT USE OF INSULIN, UNSPECIFIED LATERALITY (H): ICD-10-CM

## 2018-01-23 PROCEDURE — 36415 COLL VENOUS BLD VENIPUNCTURE: CPT | Performed by: NURSE PRACTITIONER

## 2018-01-23 PROCEDURE — 99605 MTMS BY PHARM NP 15 MIN: CPT | Performed by: PHARMACIST

## 2018-01-23 PROCEDURE — 80048 BASIC METABOLIC PNL TOTAL CA: CPT | Performed by: NURSE PRACTITIONER

## 2018-01-23 PROCEDURE — 99496 TRANSJ CARE MGMT HIGH F2F 7D: CPT | Performed by: NURSE PRACTITIONER

## 2018-01-23 PROCEDURE — 90791 PSYCH DIAGNOSTIC EVALUATION: CPT | Performed by: SOCIAL WORKER

## 2018-01-23 PROCEDURE — 99607 MTMS BY PHARM ADDL 15 MIN: CPT | Performed by: PHARMACIST

## 2018-01-23 NOTE — PROGRESS NOTES
[facility]   Integrated Behavioral Health Services   Diagnostic Assessment      PATIENT'S NAME: Nena Tang  MRN:   [id number]  :   1960  DATE OF SERVICE: 2018  SERVICE LOCATION: Face to Face in Clinic  Visit Activities: [facility] Only      Identifying Information:  Patient is a 57 year old year old, ,  female.  Patient attended the session with foster home worker.        Referral:  Patient was referred for an assessment by self and PCP.   Reason for referral: clarify behavioral health diagnosis and determine behavioral health treatment options.       Patient's Statement of Presenting Concern:  Patient reports the following reason(s) for seeking an assessment at this time: To clarify her current diagnosis.  Patient stated that her symptoms have resulted in the following functional impairments: academic performance, chronic disease management, educational activities, health maintenance, home life with family, management of the household and or completion of tasks, relationship(s), social interactions and work / vocational responsibilities      History of Presenting Concern:  Patient reports that these problem(s) began many years ago. Patient has attempted to resolve these concerns in the past through: counseling, medication(s) from physician / PCP and psychiatry. Patient reports that other professional(s) are not involved in providing support / services. Pt has worked with Psychiatry and therapist in the past but has not seen either of these individuals for quite awhile. Patient reports that other professional(s) are involved in providing support / services. PCP and [facility]      Social History:  Patient reported she grew up in [place]. They were the seventh born of 7 children.  Patient reported that her childhood was chaotic - her mother and step-father also was an alcoholic - parents got  when she was 19 years old.  Patient reported a  "history of two committed relationships and one marriage. Patient has been  for three years. Patient reported having two children - son Richard is 33 and son Hal is 31 years old.   Pt has 7 grandchildren from her 2 sons - she does maintain contact with these individuals.. Patient identified few stable and meaningful social connections.   Patient lives adult foster care.  Patient is currently disabled.  Work history     Patient reported that she has not been involved with the legal system.  Patient's highest education level was high school graduate and  - pt notes she was \"in a special class\" and says she was dx with a learning disability but does not know what type of disability.     Mental Health History:  Patient reported the following biological family members or relatives with mental health issues: Son experienced Bipolar Disorder, Sister experienced Bipolar Disorder and Schizophrenia. Patient has received the following mental health services in the past: counseling and psychiatry and inpatient acute psychiatric, tried day treatment but did not complete, , [facility] worker [facility] worker,. Patient is currently receiving any mental health services of [facility] and PCP.  Sister Ann struggled with Bipolar and Schizophrenic and she states her son has struggled with Bipolar Disorder     Chemical Health History:  Patient reported the following biological family members or relatives with chemical health issues: her 2 sisters, her parent, brothers has struggled with chemical health issues. Patient has received chemical dependency treatment in the past at [facility] and she did have extended period of sobriety in the past. Patient is not currently receiving any chemical dependency treatment. Patient reported the following problems as a result of drinking: financial problems, relationship problems and poor management of her diabetes when she is drinking.  Has been of drug for 15 to 20 years and " sober of alcohol about 1 year.     Cage-AID score is: 0 Based on Cage-Aid score and clinical interview there        Discussed the general effects of drugs and alcohol on health and well-being.      Significant Losses / Trauma / Abuse / Neglect Issues:  There are indications or report of significant loss, trauma, abuse or neglect issues related to: death of , job loss yes, major medical problems yes, divorce / relational changes yes and client s experience of emotional abuse yes.    Issues of possible neglect are not present.      Medical History:   Patient Active Problem List   Diagnosis     Osteopenia     Vitamin B12 deficiency without anemia     Hyperlipidemia LDL goal <100     Moderate major depression (H)     Rotator cuff syndrome     Type 2 diabetes mellitus with mild nonproliferative retinopathy (H)     Illiterate     Irritable bowel syndrome     overweight - BMI >35     Takotsubo cardiomyopathy     CAD (coronary artery disease)     Restless legs syndrome (RLS)     CINDY (obstructive sleep apnea)- mild AHI 10.3     Verbal auditory hallucination     Chronic low back pain     Schizoaffective disorder, depressive type (H)     Migraine headache     HTN, goal below 140/90     Health Care Home     Lumbago     Cervicalgia     Cocaine abuse, episodic     Suicidal ideation     Esophageal reflux     Mild nonproliferative diabetic retinopathy (H)     Tardive dyskinesia     Alcohol use     Left cataract     Falls frequently     History of uterine cancer     Depression     Psychophysiological insomnia     Dysuria     Asymptomatic postmenopausal status     Abdominal pain, right lower quadrant     Sepsis (H)     Pneumonia of right lower lobe due to infectious organism (H)     Infectious encephalopathy     Non-intractable vomiting with nausea     Acute respiratory failure with hypoxia (H)     Thoughts of self harm     Gastroenteritis       Medication Review:  Current Outpatient Prescriptions   Medication     blood  glucose monitoring (ONETOUCH VERIO IQ) test strip     amantadine (SYMMETREL) 100 MG capsule     haloperidol (HALDOL) 5 MG tablet     ONETOUCH ULTRA test strip     NOVOFINE 30 30G X 8 MM insulin pen needle     DiphenhydrAMINE HCl (DIPHENDRYL PO)     clotrimazole (LOTRIMIN) 1 % cream     HALOPERIDOL PO     insulin glargine (LANTUS) 100 UNIT/ML injection     insulin aspart (NOVOLOG FLEXPEN) 100 UNIT/ML injection     prochlorperazine (COMPAZINE) 5 MG tablet     ranitidine (ZANTAC) 300 MG tablet     order for DME     losartan (COZAAR) 100 MG tablet     gabapentin (NEURONTIN) 300 MG capsule     blood glucose monitoring (ONETOUCH VERIO IQ SYSTEM) meter device kit     blood glucose monitoring (ONE TOUCH DELICA) lancets     vitamin D3 (CHOLECALCIFEROL) 70021 UNITS capsule     albuterol (PROAIR HFA/PROVENTIL HFA/VENTOLIN HFA) 108 (90 BASE) MCG/ACT Inhaler     senna-docusate (SENOKOT-S;PERICOLACE) 8.6-50 MG per tablet     ACETAMINOPHEN PO     atorvastatin (LIPITOR) 80 MG tablet     metoprolol (TOPROL-XL) 100 MG 24 hr tablet     polyethylene glycol (MIRALAX/GLYCOLAX) powder     TRULICITY 1.5 MG/0.5ML pen     aspirin (ASPIRIN LOW DOSE) 81 MG EC tablet     ibuprofen (ADVIL,MOTRIN) 600 MG tablet     melatonin 5 MG CAPS     glucose 4 G CHEW     order for DME     order for DME     No current facility-administered medications for this visit.        Patient was provided recommendation to follow-up with physician.    Mental Status Assessment:  Appearance:   Appropriate   Eye Contact:   Fair   Psychomotor Behavior: Normal   Attitude:   Cooperative   Orientation:   All  Speech   Rate / Production: Normal    Volume:  Normal   Mood:    Anxious  Normal  Affect:    Appropriate  Blunted  Worrisome   Thought Content:  Clear   Thought Form:  Coherent  Goal Directed   Insight:    Fair       Safety Assessment:    Patient has had a history of suicidal ideation: yes in the recent past  Patient denies current or recent suicidal ideation or  behaviors.  Patient denies current or recent homicidal ideation or behaviors.  Patient denies current or recent self injurious behavior or ideation.  Patient denies other safety concerns.  Patient reports there are no firearms in the house  Protective Factors Sense of responsibility to family, Religiosity, Positive social support and Positive therapeutic releationships   Risk Factors Current high stress, Family history of suicide, History of attempted suicide, Intense emotionality, Intense guilt, Isolation, Sense of hopelessness and/or helplessness and Sense of worthlessness      Plan for Safety and Risk Management:  A safety and risk management plan has not been developed at this time, however patient was encouraged to call [place] [phone] should there be a change in any of these risk factors.      Review of Symptoms:  Depression:               Sleep Interest Energy Concentration Appetite Psychomotor slowing or agitation Hopeless Ruminations Irritability, having suicidal thoughts, feeling low most days  Shannon:                                   Psychosis:                 Auditory or Visual Hallucinations Paranoia Pt notes she feels people try to conspire agains her but she does not have specific example  Anxiety:                     Worries Nervousness Usual Unusual Triggers: Pt states she becomes more anxious when she drinks, she reported that she has had panic attacks, worrying to much-hard to control the worrying.     Panic:                                  Palpitations Tremors Shortness of Breath Tingling Numbness Sense of Impending Doom Triggers:    Post Traumatic Stress Disorder:                  flashbacks, has emotional reactions to flashbacks, has nightmares, avoids places and people that remind her of event-reported dissociative experiences.    Obsessive Compulsive Disorder:                 No symptoms  Eating Disorder:                    No symptoms  Oppositional Defiant Disorder:                      No symptoms  ADD / ADHD:            No symptoms  Conduct Disorder:                 No symptoms      The patient has history of active auditory and visual hallucinations    Patient's Strengths and Limitations:  Patient identified the following strengths or resources that will help her succeed in counseling: Cheondoism, commitment to health and well being, demi / spirituality, friends / good social support and sense of humor. Patient identified the following supports: Shinto / spirituality, support group and . Things that may interfere with the patien'ts success in behavioral health services include:few friends and lack of family support.    Diagnostic Criteria:    Depression:               Sleep Interest Energy Concentration Appetite Psychomotor slowing or agitation Hopeless Ruminations Irritability, having suicidal thoughts, feeling low most days  Shannon:                                   Psychosis:                 Auditory or Visual Hallucinations Paranoia Pt notes she feels people try to conspire agains her but she does not have specific example  Anxiety:                     Worries Nervousness Usual Unusual Triggers: Pt states she becomes more anxious when she drinks, she reported that she has had panic attacks, worrying to much-hard to control the worrying.     Panic:                                  Palpitations Tremors Shortness of Breath Tingling Numbness Sense of Impending Doom Triggers:    Post Traumatic Stress Disorder:                  flashbacks, has emotional reactions to flashbacks, has nightmares, avoids places and people that remind her of event-reported dissociative experiences.    Obsessive Compulsive Disorder:                 No symptoms  Eating Disorder:                    No symptoms  Oppositional Defiant Disorder:                     No symptoms  ADD / ADHD:            No symptoms  Conduct Disorder:                 No symptoms      The patient has history of active auditory and  visual hallucinations      Functional Status:  Patient's symptoms have caused and are causing reduced functional status in the following areas: Academics / Education - yes  Activities of Daily Living - yes  Financial management yes  Follow through with Medical recommendations - yes  Occupational / Vocational - yes  Social / Relational - yes      DSM5 Diagnoses: (Sustained by DSM5 Criteria Listed Above)  Diagnoses: 295.70 (F25.0), Schizoaffective disorder, depressive type; 309.81 (F43.10) Posttraumatic Stress Disorder with panic specifier, history of chemical dependency issues (polysubstance dependence)  Psychosocial & Contextual Factors: Academics / Education - yes  Activities of Daily Living - yes  Financial management yes  Follow through with Medical recommendations - yes  Occupational / Vocational - yes  Social / Relational - yes, grief and loss  WHODAS Score: 30  See Media section of EPIC medical record for completed WHODAS    Preliminary Treatment Plan:    The client reports no currently identified Lutheran, ethnic or cultural issues relevant to therapy.    Initial Treatment will focus on: Depressed Mood - The patient will score 5 or below on the PHQ-9  Anxiety - The patient will identify at least 2 ways to successfully manage anxiety  Risk Management / Safety Concerns related to: Suicidal ideation  Grief / Loss - The patient will work through grief issues  Alcohol / Substance Use - the patient will remain sober  Thought Disturbance including: delusions, hallucinations and paranoia.    Chemical dependency recommendations: Maintain Sobriety    As a preliminary treatment goal, patient will experience a reduction in depressed mood, will develop more effective coping skills to manage depressive symptoms, will develop healthy cognitive patterns and beliefs, will increase ability to function adaptively and will continue to take medications as prescribed / participate in supportive activities and services , will  experience a reduction in anxiety, will develop more effective coping skills to manage anxiety symptoms, will develop healthy cognitive patterns and beliefs and will increase ability to function adaptively, will develop strategies for more effective management of risk issues, will increase understanding of normal grieving process, will engage in effective approach to address and resolve grief/loss issues and will process grief/loss issues in an adaptive manner, will discuss/consider potential need for formal substance use evaluation, treatment and/or support  continue to make healthy choices regarding substance use and engage in activities / supportive services that promote sobriety and will develop skills to more effectively manage symptoms, will develop more effective support systems and will continue to take medications as prescribed.    The focus of initial interventions will be to alleviate anxiety, alleviate depressed mood, alleviate psychotic symptoms, facilitate appropriate expression of feelings, increase ability to function adaptively, increase coping skills, increase self esteem, increase trust, process losses, process traumas, provide homework to reinforce skill development, reduce panic attacks, teach CBT skills, teach communication skills, teach DBT skills, teach distress tolerance skills, teach emotional regulation, teach mindfulness skills, teach problem-solving skills, teach relaxation strategies, teach sleep hygiene, teach social skills and teach stress mangement techniques.    Collaboration with other professionals is not indicated at this time.    Referral to another professional/service is not indicated at this time..    A Release of Information is not needed at this time.    Report to child or adult protection services was NA.    Richardson Lopez, [facility], Behavioral Health Clinician

## 2018-01-23 NOTE — NURSING NOTE
"Chief Complaint   Patient presents with     Hospital F/U       Initial /80  Pulse 80  Temp 98.2  F (36.8  C) (Oral)  Resp 16  Wt 226 lb 8 oz (102.7 kg)  LMP 01/06/2015  SpO2 98%  BMI 44.09 kg/m2 Estimated body mass index is 44.09 kg/(m^2) as calculated from the following:    Height as of 1/14/18: 5' 0.1\" (1.527 m).    Weight as of this encounter: 226 lb 8 oz (102.7 kg).  Medication Reconciliation: complete    "

## 2018-01-23 NOTE — MR AVS SNAPSHOT
After Visit Summary   1/23/2018    Nena Tang    MRN: [id number]           Patient Information     Date Of Birth          1960        Visit Information        Provider Department      1/23/2018 2:00 PM Eugenia De Jesus, [facility]        Today's Diagnoses     Type 2 diabetes mellitus with hyperglycemia, with long-term current use of insulin (H)    -  1    HTN, goal below 140/90        Schizoaffective disorder, depressive type (H)        Insomnia, unspecified type        Nutritional deficiency          Care Instructions    See AVS from PCP encounter for details           Follow-ups after your visit        Your next 10 appointments already scheduled     Jan 31, 2018  8:15 AM CST   RETURN RETINA with Tiburcio Chacon MD   [facility] Clinic ([facility])    [address]  [address] [place] [address]  [address]  [facility] [address]   [phone]            Feb 05, 2018  9:30 AM CST   New Visit with Kraig Pedersen MD   [facility] ([facility])    [address]  [facility] [address] [phone]            Feb 06, 2018  2:30 PM CST   Return Visit with Richardson Lopez [facility] ([facility])    [address]  [address] [place] [id number] [phone]            Feb 20, 2018  3:00 PM CST   SHORT with Eugenia De Jesus [facility] ([facility])    [address] [facility]  [address] [place] [address] [phone]            Feb 20, 2018  3:30 PM CST   Return Visit with ART Arias CNP   [facility] ([facility])    [address]  [address] [place] [address] [phone]            Feb 20, 2018  3:30 PM CST   Return Visit with  Richardson Lopez, [facility] ([facility])    [address]  [address]  [facility] [address] [phone]              Who to contact     If you have questions or need follow up information about today's clinic visit or your schedule please contact [facility]TM directly at [phone].  Normal or non-critical lab and imaging results will be communicated to you by MyChart, letter or phone within 4 business days after the clinic has received the results. If you do not hear from us within 7 days, please contact the clinic through medineeringhart or phone. If you have a critical or abnormal lab result, we will notify you by phone as soon as possible.  Submit refill requests through [employer] or call your pharmacy and they will forward the refill request to us. Please allow 3 business days for your refill to be completed.          Additional Information About Your Visit        medineeringhart Information     [employer] gives you secure access to your electronic health record. If you see a primary care provider, you can also send messages to your care team and make appointments. If you have questions, please call your primary care clinic.  If you do not have a primary care provider, please call [phone] and they will assist you.        Care EveryWhere ID     This is your Care EveryWhere ID. This could be used by other organizations to access your [place] medical records  [id number]        Your Vitals Were     Last Period                   01/06/2015            Blood Pressure from Last 3 Encounters:   01/23/18 130/80   01/17/18 113/49   01/14/18 109/61    Weight from Last 3 Encounters:   01/23/18 226 lb 8 oz (102.7 kg)   01/18/18 219 lb 4.8 oz (99.5 kg)   12/29/17 221 lb 8 oz (100.5 kg)              Today, you had the following     No orders found for display         Today's Medication Changes          These changes are  accurate as of 1/23/18 11:59 PM.  If you have any questions, ask your nurse or doctor.               These medicines have changed or have updated prescriptions.        Dose/Directions    insulin glargine 100 UNIT/ML injection   Commonly known as:  LANTUS   This may have changed:  additional instructions   Used for:  Type 2 diabetes mellitus with mild nonproliferative retinopathy without macular edema, with long-term current use of insulin, unspecified laterality (H)        Dose:  45 Units   Inject 45 Units Subcutaneous At Bedtime   Quantity:  3 mL   Refills:  11       melatonin 5 MG Caps   This may have changed:  how much to take   Used for:  Insomnia, unspecified type   Changed by:  Eugenia De Jesus RPH        Dose:  2 capsule   Take 2 capsules by mouth daily At dinnertime   Quantity:  180 capsule   Refills:  3            Where to get your medicines      These medications were sent to [address] [address]     Phone:  [phone]     melatonin 5 MG Caps                Primary Care Provider Office Phone # Fax #    Rowena ART Blanton [phone]       [address] [facility] [address]  [facility] [address]        Goals        General    Medical (pt-stated)     Notes - Note created  7/7/2016  9:15 AM by Chelsea Pulido, RN    Get blood sugars under control    Improve medication compliance    As of today's date 7/7/2016 goal is met at 0 - 25%.   Goal Status:  Active          Equal Access to Services     RAMESH GARRIDO AH: Hadii samira wadeo Sodelphine, waaxda luqadaha, qaybta kaalmada adeegyada, lorena ellison . So [facility] [phone].    ATENCIÓN: Si habla español, tiene a trinh disposición servicios gratuitos de asistencia lingüística. Llame al [phone].    We comply with applicable federal civil rights laws and [place] laws. We do not discriminate on the basis of race, color,  national origin, age, disability, sex, sexual orientation, or gender identity.            Thank you!     Thank you for choosing [facility] [place] [facility]TM  for your care. Our goal is always to provide you with excellent care. Hearing back from our patients is one way we can continue to improve our services. Please take a few minutes to complete the written survey that you may receive in the mail after your visit with us. Thank you!             Your Updated Medication List - Protect others around you: Learn how to safely use, store and throw away your medicines at www.disposemymeds.org.          This list is accurate as of 1/23/18 11:59 PM.  Always use your most recent med list.                   Brand Name Dispense Instructions for use Diagnosis    ACETAMINOPHEN PO      Take 1,000 mg by mouth every 6 hours as needed for pain or fever        albuterol 108 (90 BASE) MCG/ACT Inhaler    PROAIR HFA/PROVENTIL HFA/VENTOLIN HFA    3 Inhaler    Inhale 2 puffs into the lungs every 6 hours as needed for shortness of breath / dyspnea or wheezing    Dyspnea on exertion       amantadine 100 MG capsule    SYMMETREL    60 capsule    Take 1 capsule (100 mg) by mouth 2 times daily    Schizoaffective disorder, depressive type (H), Tardive dyskinesia       aspirin 81 MG EC tablet    ASPIRIN LOW DOSE    100 tablet    Take 1 tablet (81 mg) by mouth daily    Coronary artery disease involving native heart with angina pectoris, unspecified vessel or lesion type (H)       atorvastatin 80 MG tablet    LIPITOR    28 tablet    TAKE 1 TABLET (80MG) BY MOUTH DAILY    Hyperlipidemia LDL goal <100       blood glucose monitoring lancets     150 each    Use to test blood sugar 2 times daily or as directed.  Ok to substitute alternative if insurance prefers.    Type 2 diabetes mellitus with diabetic neuropathy, with long-term current use of insulin (H)       blood glucose monitoring test strip    ONETOUCH VERIO IQ    200 strip    Use  to test blood sugar 4 times daily .  Ok to substitute alternative if insurance prefers.    Type 2 diabetes mellitus with diabetic neuropathy, with long-term current use of insulin (H)       clotrimazole 1 % cream    LOTRIMIN     Apply topically 2 times daily        DIPHENDRYL PO      Take 25 mg by mouth every 6 hours as needed for itching        gabapentin 300 MG capsule    NEURONTIN    168 capsule    TAKE 2 CAPSULES (600MG) BY MOUTH THREE TIMES A DAY    Type 2 diabetes mellitus with diabetic neuropathy, with long-term current use of insulin (H)       glucose 4 G Chew chewable tablet     20 tablet    Take 2 every 15 minutes for blood sugar <70mg/dL. Recheck blood sugar every 15 minutes until above 70mg/dL, then eat a substantial meal.    Type 2 diabetes mellitus with mild nonproliferative retinopathy without macular edema, with long-term current use of insulin, unspecified laterality (H)       * HALOPERIDOL PO      Take 5 mg by mouth every 12 hours as needed for agitation        * haloperidol 5 MG tablet    HALDOL    60 tablet    Take 1 tablet (5 mg) by mouth 2 times daily    Schizoaffective disorder, depressive type (H)       ibuprofen 600 MG tablet    ADVIL/MOTRIN    60 tablet    Take 1 tablet (600 mg) by mouth every 4 hours as needed for moderate pain    Closed fracture of one rib of right side, initial encounter       insulin aspart 100 UNIT/ML injection    NovoLOG FLEXPEN    15 mL    Inject 20 Units Subcutaneous 3 times daily (with meals) Once daily, can add additional 5 units if BGs are >500mg/dL.    Type 2 diabetes mellitus with mild nonproliferative retinopathy without macular edema, with long-term current use of insulin, unspecified laterality (H)       insulin glargine 100 UNIT/ML injection    LANTUS    3 mL    Inject 45 Units Subcutaneous At Bedtime    Type 2 diabetes mellitus with mild nonproliferative retinopathy without macular edema, with long-term current use of insulin, unspecified laterality (H)        losartan 100 MG tablet    COZAAR    28 tablet    TAKE 1 TABLET (100MG) BY MOUTH DAILY    Coronary artery disease involving native heart with angina pectoris, unspecified vessel or lesion type (H)       melatonin 5 MG Caps     180 capsule    Take 2 capsules by mouth daily At dinnertime    Insomnia, unspecified type       metoprolol succinate 100 MG 24 hr tablet    TOPROL-XL    28 tablet    TAKE 1 TABLET (100MG) BY MOUTH DAILY    Coronary artery disease involving native heart with angina pectoris, unspecified vessel or lesion type (H)       NOVOFINE 30 30G X 8 MM   Generic drug:  insulin pen needle     100 each    USE 4 DAILY OR AS DIRECTED    Type 2 diabetes mellitus with mild nonproliferative retinopathy without macular edema, with long-term current use of insulin, unspecified laterality (H)       * order for DME     1 each    Hold Novolog if meals are refused.    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, with long-term current use of insulin, unspecified laterality (H)       polyethylene glycol powder    MIRALAX/GLYCOLAX    527 g    TAKE 17 GRAMS (1 CAPFUL) BY MOUTH DAILY    Constipation, unspecified constipation type       prochlorperazine 5 MG tablet    COMPAZINE    90 tablet    Take 1 tablet (5 mg) by mouth every 6 hours as needed for nausea or vomiting    Nausea       ranitidine 300 MG tablet    ZANTAC    90 tablet    TAKE 1 TABLET (300MG) BY MOUTH AT BEDTIME    Gastroesophageal reflux disease without esophagitis       senna-docusate 8.6-50 MG per tablet    SENOKOT-S;PERICOLACE     Take 1 tablet by mouth 2 times daily as needed for constipation        TRULICITY 1.5 MG/0.5ML pen   Generic drug:  dulaglutide     2 mL    INJECT 1.5MG SUBCUTANEOUSLY EVERY SEVEN  DAYS    Type 2 diabetes mellitus with mild nonproliferative retinopathy without macular edema, with long-term current use of insulin, unspecified laterality (H)       * Notice:  This list has 4 medication(s) that are the same as other medications prescribed for you. Read the directions carefully, and ask your doctor or other care provider to review them with you.

## 2018-01-23 NOTE — PROGRESS NOTES
SUBJECTIVE/OBJECTIVE:                Nena Tang is a 57 year old female coming in for a transitions of care visit.  She was discharged from [facility] on 1/19/18 for schizoaffective disorder with suicidal ideation.   Seen as a covisit with Jemima Tang NP. Pt was accompanied by staff from her adult foster home.   This is a follow-up visit but the first visit of this calendar year.    Chief Complaint: Review of diabetes, wants to have lower BS for upcoming eye surgery.     Allergies/ADRs: Reviewed in Epic  Tobacco: No tobacco use   Alcohol: none  Caffeine: occasional coffee or soda while hospitalized but not currently consuming anything  Activity: sedentary  PMH: Reviewed in Epic    Medication Adherence/Access  The patient does not have any issues with adherence.  She has assistance with medication administration.  Medication barriers: no issues reported by patient.  The patient fills at [facility] in [place] and [facility] in [place] for test strips    Has the patient been offered to fill at [facility]? No  Other programs or coupons used: None     Diabetes:  Pt currently taking lantus 45 units daily at bedtime, novolog 20 units three times daily with meals, and trulicity 1.5mg once weekly.  Pt is not experiencing side effects.  SMBG: four times daily.  Having trouble getting adequate supply of test strips from pharmacy due to Medicare regulations. Ranges (from patient's glucose log): 105-311  During hospital stay BS ranged 100-150 consistently.   Date FBG/ 2hours post Lunch/2hours post Dinner/2hours post   1/23/18 105     1/22/18 204 249 151   1/21/18 205 152 243   1/20/18 163     1/19/18   207   hosp      hosp        Patient is not experiencing hypoglycemia  Recent symptoms of high blood sugar? none  Eye exam: up to date  Foot exam: up to date  Microalbumin is < 30 mg/g. Pt is taking an ACEi/ARB.  Aspirin: Taking 81mg daily and denies side effects  Diet/Exercise: Pt  "reports being unable to walk very far and uses a cane but tires easily.  Pt eats 2+ snacks per day, has been making a concerted effort to eat better since coming home from the hospital but snack example banana bread or cookies at bedtime. No longer having coffee with sugar. States last night had sm amt grapes for snack.     Hypertension: Currently taking losartan 100 mg tablet by mouth daily and metoprolol  mg tablet by mouth once daily.  Was taken off chlorthalidone while inpatient - unable to find documentation of this in Epic but [facility] staff showed discharge paperwork that clearly stated to d/c chlorthalidone.  Pt has no complaints about current HTN medication regimen and reports no side effects.    Schizoaffective: Taking haloperidol 5 mg by mouth BID (increased from once daily while inpatient). Has not needed PRN haloperidol since hospitalization, tried a couple doses without success prior to hospitalization. No longer taking sertraline, d/c'd while inpatient. Also taken off ingrezza while hospitalized and started on amantadine 100 mg capsule by mouth twice daily on 1/17/18 for tardive dyskinesia.  Has not noticed any difference in leg shaking. Per foster home staff, when pt is relaxed at home her leg shaking is greatly reduced.  Reports improvement in mood, continues to have passive suicidal thoughts but denies a plan. Tearful at times during the visit, states she is overwhelmed. Hallucinations: auditory, man is telling her to kill herself. Visual, sees a man in her room when lights are off. Feels like she is \"being haunted\" and very disturbed. The man has been present since she moved into this residence. Hallucination is reduced when lights are on.   She is currently seeing an outside psychiatrist via virtual visits but is interested in establishing with face-to-face psychiatrist at [facility].    Insomnia: Has been taking melatonin 5 mg tablets by mouth at dinner but reports that it does not work well.  " She is up most nights until 12:00 or 1:00 and tosses and turns throughout the night.  Usually sleeps until 7am.   Reports starting to use her CPAP more frequently but believes it may have a leak.  States she likes to sleep with the lights to reduce visual hallucination as above; leaves on the TV and also turns on lights. When ready to fall asleep, will put on her mask and close her eyes, trying to go to sleep as quickly as possible due to fear of the hallucination.     Vit D deficiency: Reports continuing vitamin D 48202 unit capsule once weekly.  No reported problems with this.    Lab Results   Component Value Date    VITDT 69 11/07/2017    VITDT 13 (L) 07/13/2016    VITDT (L) 04/19/2016     <13  Season, race, dietary intake, and treatment affect the concentration of   25-hydroxy-Vitamin D. Values may decrease during winter months and increase   during summer months. Values 20-29 ug/L may indicate Vitamin D insufficiency   and values <20 ug/L may indicate Vitamin D deficiency.   Vitamin D determination is routinely performed by an immunoassay specific for   25 hydroxyvitamin D3.  If an individual is on vitamin D2 (ergocalciferol)   supplementation, please specify 25 OH vitamin D2 and D3 level determination by   LCMSMS test [id number].      VITDT (L) 01/28/2013     <13  Season, race, dietary intake, and treatment affect the concentration of   25-hydroxy-Vitamin D. Values may decrease during winter months and increase   during summer months. Values less than 30 ug/L may indicate Vitamin D   deficiency.   Vitamin D determiniation is routinely performed by an immunoassay specific for   25 hydroxyvitamin D3.  If an individual is on vitamin D2 (ergocalciferol)   supplementation, please specify 25 OH vitamin D2 and D3 level determination by   LCMSMS test VITD23.  For questions, please contact the laboratory at   [phone].         Current labs include:  BP Readings from Last 3 Encounters:   01/23/18 130/80   01/17/18  113/49   01/14/18 109/61     Lab Results   Component Value Date    A1C 8.9 12/09/2017   .  Lab Results   Component Value Date    CHOL 154 06/27/2017     Lab Results   Component Value Date    TRIG 267 06/27/2017     Lab Results   Component Value Date    HDL 37 06/27/2017     Lab Results   Component Value Date    LDL 64 06/27/2017       Liver Function Studies -   Recent Labs   Lab Test  01/13/18   2315   PROTTOTAL  8.2   ALBUMIN  3.8   BILITOTAL  0.2   ALKPHOS  115   AST  15   ALT  29       Lab Results   Component Value Date    UCRR 160 06/27/2017    MICROL 11 06/27/2017    UMALCR 6.81 06/27/2017       Last Basic Metabolic Panel:  Lab Results   Component Value Date     01/13/2018      Lab Results   Component Value Date    POTASSIUM 4.0 01/13/2018     Lab Results   Component Value Date    CHLORIDE 103 01/13/2018     Lab Results   Component Value Date    BUN 27 01/13/2018     Lab Results   Component Value Date    CR 1.11 01/14/2018     GFR Estimate   Date Value Ref Range Status   01/14/2018 51 (L) >60 mL/min/1.7m2 Final     Comment:     Non  GFR Calc   01/13/2018 33 (L) >60 mL/min/1.7m2 Final     Comment:     Non  GFR Calc   12/07/2017 56 (L) >60 mL/min/1.7m2 Final     Comment:     Non  GFR Calc     GFR Estimate If Black   Date Value Ref Range Status   01/14/2018 61 >60 mL/min/1.7m2 Final     Comment:      GFR Calc   01/13/2018 40 (L) >60 mL/min/1.7m2 Final     Comment:      GFR Calc   12/07/2017 68 >60 mL/min/1.7m2 Final     Comment:      GFR Calc     TSH   Date Value Ref Range Status   11/07/2017 3.21 0.40 - 4.00 mU/L Final   ]    Most Recent Immunizations   Administered Date(s) Administered     Influenza (IIV3) PF 09/24/2012     Influenza Vaccine IM 3yrs+ 4 Valent IIV4 11/06/2017     Mantoux Tuberculin Skin Test 07/10/2014     Pneumococcal 23 valent 01/22/2009     TDAP Vaccine (Adacel) 01/22/2009       ASSESSMENT:                  Current medications were reviewed today.      Medication Adherence: no issues identified    Diabetes: needs improvement- Last A1C was 8.9 so she is above her A1C goal of <7.  She is also not meeting her fasting BG goal of  and post prandial goal of <180.  Pt would benefit from more exercise and better control of carbohydrate intake from diet.  Pt would benefit from adjustment in Novolog dose, including lower dose of Novolog to be administered with her bedtime snack, but pt declines changes in dosing today. Contacted [facility] ([place] and spoke with Connor to have Medicare authorization form faxed to us for PCP to complete so patient can start receiving adequate number of test strips to test BG 4x/day instead of the 3x/day she's currently authorized for.       Hypertension: Controlled - currently meeting her BP goal of <140/90.  The discontinuation of chlorthalidone is not currently impacting her BP readings. Will monitor for need to restart.     schizoaffective: improved. Pt will benefit from psychiatry follow-up as scheduled and establish care with Dr Pedersen.     Insomnia: Needs improvement - pt may benefit from dose increase of melatonin. Will address bothersome hallucinations with psychiatry as above.    Vit D deficiency: Stable - vit D level is on the high side of normal. Will consider reducing dose of supplementation to daily dose of 2000u. Did not discuss today, deferred to follow-up.    PLAN:              1. Patient encouraged to exercise more and reduce carb intake especially with snacks.  Awaiting medicare authorization form for testing strips.    2. Increase melatonin to 10 mg by mouth at dinner    I spent 30 minutes with this patient today.  All changes were made via collaborative practice agreement with Rowena Haas. A copy of the visit note was provided to the patient's primary care provider.    Will follow up in 1 month.    The patient was given a summary of these  recommendations as an after visit summary.    John Bess, 2018 PharmD Candidate  Eugenia De Jesus, PharmD, BCACP

## 2018-01-23 NOTE — MR AVS SNAPSHOT
After Visit Summary   1/23/2018    Nena Tang    MRN: [id number]           Patient Information     Date Of Birth          1960        Visit Information        Provider Department      1/23/2018 2:00 PM Wendy Tang APRN [facility]        Today's Diagnoses     Hospital discharge follow-up    -  1    Schizoaffective disorder, depressive type (H)        Suicidal ideation        Type 2 diabetes mellitus with mild nonproliferative retinopathy without macular edema, with long-term current use of insulin, unspecified laterality (H)        Slow transit constipation          Care Instructions    We have scheduled you a visit wit our Psychiatrist 2/5 at 9:30    Please work on moving every 2 hours    Please work on better snacks for your diabetes    We will get your strips ordered    We will have you see Rowena in one month     If your Miralax isn't helping with your constipation go ahead and take your Senna-s          Follow-ups after your visit        Your next 10 appointments already scheduled     Jan 31, 2018  8:15 AM CST   RETURN RETINA with Tiburcio Chacon MD   Eye Clinic ([facility])    [address]  [address] [facility]  [address]a  [facility] [address] [phone]              Who to contact     If you have questions or need follow up information about today's clinic visit or your schedule please contact [facility] [place] [facility] directly at [phone].  Normal or non-critical lab and imaging results will be communicated to you by MyChart, letter or phone within 4 business days after the clinic has received the results. If you do not hear from us within 7 days, please contact the clinic through MyChart or phone. If you have a critical or abnormal lab result, we will notify you by phone as soon as possible.  Submit refill requests through [employer] or call your pharmacy and they will forward  the refill request to us. Please allow 3 business days for your refill to be completed.          Additional Information About Your Visit        [employer] Information     [employer] gives you secure access to your electronic health record. If you see a primary care provider, you can also send messages to your care team and make appointments. If you have questions, please call your primary care clinic.  If you do not have a primary care provider, please call [phone] and they will assist you.        Care EveryWhere ID     This is your Care EveryWhere ID. This could be used by other organizations to access your [place] medical records  [id number]        Your Vitals Were     Pulse Temperature Respirations Last Period Pulse Oximetry BMI (Body Mass Index)    80 98.2  F (36.8  C) (Oral) 16 01/06/2015 98% 44.09 kg/m2       Blood Pressure from Last 3 Encounters:   01/23/18 130/80   01/17/18 113/49   01/14/18 109/61    Weight from Last 3 Encounters:   01/23/18 226 lb 8 oz (102.7 kg)   01/18/18 219 lb 4.8 oz (99.5 kg)   12/29/17 221 lb 8 oz (100.5 kg)              We Performed the Following     Basic metabolic panel  (Ca, Cl, CO2, Creat, Gluc, K, Na, BUN)          Today's Medication Changes          These changes are accurate as of: 1/23/18  3:18 PM.  If you have any questions, ask your nurse or doctor.               These medicines have changed or have updated prescriptions.        Dose/Directions    insulin glargine 100 UNIT/ML injection   Commonly known as:  LANTUS   This may have changed:  additional instructions   Used for:  Type 2 diabetes mellitus with mild nonproliferative retinopathy without macular edema, with long-term current use of insulin, unspecified laterality (H)        Dose:  45 Units   Inject 45 Units Subcutaneous At Bedtime   Quantity:  3 mL   Refills:  11       melatonin 5 MG Caps   This may have changed:  how much to take   Used for:  Insomnia, unspecified type   Changed by:  Eugenia De Jesus,  RPH        Dose:  2 capsule   Take 2 capsules by mouth daily At dinnertime   Quantity:  180 capsule   Refills:  3       vitamin D3 19115 UNITS capsule   Commonly known as:  CHOLECALCIFEROL   This may have changed:  See the new instructions.   Used for:  Vitamin D deficiency        TAKE 1 CAPSULE (50,000 UNITS) BY MOUTH ONCE A WEEK   Quantity:  4 capsule   Refills:  3            Where to get your medicines      These medications were sent to [address] [address]     Phone:  [phone]     melatonin 5 MG Caps                Primary Care Provider Office Phone # Fax #    Rowena HaasART [phone]       [address] [facility] [address]  [facility] [address]        Goals        General    Medical (pt-stated)     Notes - Note created  7/7/2016  9:15 AM by Chelsea Pulido RN    Get blood sugars under control    Improve medication compliance    As of today's date 7/7/2016 goal is met at 0 - 25%.   Goal Status:  Active          Equal Access to Services     [facility]: Hadii aad ku hadasho Soomaali, waaxda luqadaha, qaybta kaalmada adeegyada, lorena ellison . So [facility] [phone].    ATENCIÓN: Si habla español, tiene a trinh disposición servicios gratGuadalupe County Hospitalos de asistencia lingüística. Smith al [phone].    We comply with applicable federal civil rights laws and [place] laws. We do not discriminate on the basis of race, color, national origin, age, disability, sex, sexual orientation, or gender identity.            Thank you!     Thank you for choosing [facility] PRIMARY CARE  for your care. Our goal is always to provide you with excellent care. Hearing back from our patients is one way we can continue to improve our services. Please take a few minutes to complete the written survey that you may receive in the mail after your visit with us. Thank you!              Your Updated Medication List - Protect others around you: Learn how to safely use, store and throw away your medicines at www.disposemymeds.org.          This list is accurate as of: 1/23/18  3:18 PM.  Always use your most recent med list.                   Brand Name Dispense Instructions for use Diagnosis    ACETAMINOPHEN PO      Take 1,000 mg by mouth every 6 hours as needed for pain or fever        albuterol 108 (90 BASE) MCG/ACT Inhaler    PROAIR HFA/PROVENTIL HFA/VENTOLIN HFA    3 Inhaler    Inhale 2 puffs into the lungs every 6 hours as needed for shortness of breath / dyspnea or wheezing    Dyspnea on exertion       amantadine 100 MG capsule    SYMMETREL    60 capsule    Take 1 capsule (100 mg) by mouth 2 times daily    Schizoaffective disorder, depressive type (H), Tardive dyskinesia       aspirin 81 MG EC tablet    ASPIRIN LOW DOSE    100 tablet    Take 1 tablet (81 mg) by mouth daily    Coronary artery disease involving native heart with angina pectoris, unspecified vessel or lesion type (H)       atorvastatin 80 MG tablet    LIPITOR    28 tablet    TAKE 1 TABLET (80MG) BY MOUTH DAILY    Hyperlipidemia LDL goal <100       blood glucose monitoring lancets     150 each    Use to test blood sugar 2 times daily or as directed.  Ok to substitute alternative if insurance prefers.    Type 2 diabetes mellitus with diabetic neuropathy, with long-term current use of insulin (H)       blood glucose monitoring test strip    ONETOUCH VERIO IQ    200 strip    Use to test blood sugar 4 times daily .  Ok to substitute alternative if insurance prefers.    Type 2 diabetes mellitus with diabetic neuropathy, with long-term current use of insulin (H)       clotrimazole 1 % cream    LOTRIMIN     Apply topically 2 times daily        DIPHENDRYL PO      Take 25 mg by mouth every 6 hours as needed for itching        gabapentin 300 MG capsule    NEURONTIN    168 capsule    TAKE 2 CAPSULES (600MG) BY MOUTH THREE TIMES A DAY     Type 2 diabetes mellitus with diabetic neuropathy, with long-term current use of insulin (H)       glucose 4 G Chew chewable tablet     20 tablet    Take 2 every 15 minutes for blood sugar <70mg/dL. Recheck blood sugar every 15 minutes until above 70mg/dL, then eat a substantial meal.    Type 2 diabetes mellitus with mild nonproliferative retinopathy without macular edema, with long-term current use of insulin, unspecified laterality (H)       * HALOPERIDOL PO      Take 5 mg by mouth every 12 hours as needed for agitation        * haloperidol 5 MG tablet    HALDOL    60 tablet    Take 1 tablet (5 mg) by mouth 2 times daily    Schizoaffective disorder, depressive type (H)       ibuprofen 600 MG tablet    ADVIL/MOTRIN    60 tablet    Take 1 tablet (600 mg) by mouth every 4 hours as needed for moderate pain    Closed fracture of one rib of right side, initial encounter       insulin aspart 100 UNIT/ML injection    NovoLOG FLEXPEN    15 mL    Inject 20 Units Subcutaneous 3 times daily (with meals) Once daily, can add additional 5 units if BGs are >500mg/dL.    Type 2 diabetes mellitus with mild nonproliferative retinopathy without macular edema, with long-term current use of insulin, unspecified laterality (H)       insulin glargine 100 UNIT/ML injection    LANTUS    3 mL    Inject 45 Units Subcutaneous At Bedtime    Type 2 diabetes mellitus with mild nonproliferative retinopathy without macular edema, with long-term current use of insulin, unspecified laterality (H)       losartan 100 MG tablet    COZAAR    28 tablet    TAKE 1 TABLET (100MG) BY MOUTH DAILY    Coronary artery disease involving native heart with angina pectoris, unspecified vessel or lesion type (H)       melatonin 5 MG Caps     180 capsule    Take 2 capsules by mouth daily At dinnertime    Insomnia, unspecified type       metoprolol succinate 100 MG 24 hr tablet    TOPROL-XL    28 tablet    TAKE 1 TABLET (100MG) BY MOUTH DAILY    Coronary artery disease  involving native heart with angina pectoris, unspecified vessel or lesion type (H)       NOVOFINE 30 30G X 8 MM   Generic drug:  insulin pen needle     100 each    USE 4 DAILY OR AS DIRECTED    Type 2 diabetes mellitus with mild nonproliferative retinopathy without macular edema, with long-term current use of insulin, unspecified laterality (H)       * order for DME     1 each    Hold Novolog if meals are refused.    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, with long-term current use of insulin, unspecified laterality (H)       polyethylene glycol powder    MIRALAX/GLYCOLAX    527 g    TAKE 17 GRAMS (1 CAPFUL) BY MOUTH DAILY    Constipation, unspecified constipation type       prochlorperazine 5 MG tablet    COMPAZINE    90 tablet    Take 1 tablet (5 mg) by mouth every 6 hours as needed for nausea or vomiting    Nausea       ranitidine 300 MG tablet    ZANTAC    90 tablet    TAKE 1 TABLET (300MG) BY MOUTH AT BEDTIME    Gastroesophageal reflux disease without esophagitis       senna-docusate 8.6-50 MG per tablet    SENOKOT-S;PERICOLACE     Take 1 tablet by mouth 2 times daily as needed for constipation        TRULICITY 1.5 MG/0.5ML pen   Generic drug:  dulaglutide     2 mL    INJECT 1.5MG SUBCUTANEOUSLY EVERY SEVEN DAYS    Type 2 diabetes mellitus with mild nonproliferative retinopathy without macular edema, with long-term current use of insulin, unspecified laterality (H)       vitamin D3 33701 UNITS capsule    CHOLECALCIFEROL    4 capsule    TAKE 1 CAPSULE (50,000 UNITS) BY MOUTH ONCE A WEEK    Vitamin D deficiency       * Notice:  This list has 4 medication(s) that are the same as other medications prescribed for you. Read the directions carefully, and ask your  doctor or other care provider to review them with you.

## 2018-01-23 NOTE — MR AVS SNAPSHOT
After Visit Summary   1/23/2018    Nena Tang    MRN: [id number]           Patient Information     Date Of Birth          1960        Visit Information        Provider Department      1/23/2018 2:00 PM Richardson Lopez [facility]        Today's Diagnoses     Schizoaffective disorder, depressive type (H)    -  1    Posttraumatic stress disorder           Follow-ups after your visit        Your next 10 appointments already scheduled     Jan 31, 2018  8:15 AM CST   RETURN RETINA with Tiburcio Chacon MD   Eye Clinic ([facility])    [address]  [address] [facility]  [address] [address]a  [facility] [address]   [phone]            Feb 05, 2018  9:00 AM CST   (Arrive by 8:30 AM)   New Visit with Kraig Pedersen MD   [facility] ([facility])    [address]  [facility] [address] [phone]            Feb 06, 2018  2:30 PM CST   Return Visit with Richardson Lopez [facility] Care ([facility])    [address]  [address]  [facility] [address] [phone]            Feb 20, 2018  3:00 PM CST   SHORT with Eugenia De Jesus [facility] Care [place] ([facility])    [address] [place] [address]  [address] [place] [address] [phone]            Feb 20, 2018  3:30 PM CST   Return Visit with ART Arias [facility] Primary Care ([facility])    [address]  [address] [place] [address] [phone]            Feb 20, 2018  3:30 PM CST   Return Visit with Richardson Lopez [facility] ([facility])    [address]  [address] [place] [address] [phone]              Who  to contact     If you have questions or need follow up information about today's clinic visit or your schedule please contact [facility] PRIMARY CARE directly at [phone].  Normal or non-critical lab and imaging results will be communicated to you by MyChart, letter or phone within 4 business days after the clinic has received the results. If you do not hear from us within 7 days, please contact the clinic through [employer] or phone. If you have a critical or abnormal lab result, we will notify you by phone as soon as possible.  Submit refill requests through [employer] or call your pharmacy and they will forward the refill request to us. Please allow 3 business days for your refill to be completed.          Additional Information About Your Visit        [employer] Information     [employer] gives you secure access to your electronic health record. If you see a primary care provider, you can also send messages to your care team and make appointments. If you have questions, please call your primary care clinic.  If you do not have a primary care provider, please call [phone] and they will assist you.        Care EveryWhere ID     This is your Care EveryWhere ID. This could be used by other organizations to access your [facility] medical records  [id number]        Your Vitals Were     Last Period                   01/06/2015            Blood Pressure from Last 3 Encounters:   01/23/18 130/80   01/17/18 113/49   01/14/18 109/61    Weight from Last 3 Encounters:   01/23/18 226 lb 8 oz (102.7 kg)   01/18/18 219 lb 4.8 oz (99.5 kg)   12/29/17 221 lb 8 oz (100.5 kg)              Today, you had the following     No orders found for display         Today's Medication Changes          These changes are accurate as of 1/23/18 11:59 PM.  If you have any questions, ask your nurse or doctor.               These medicines have changed or have updated prescriptions.        Dose/Directions    insulin glargine 100  UNIT/ML injection   Commonly known as:  LANTUS   This may have changed:  additional instructions   Used for:  Type 2 diabetes mellitus with mild nonproliferative retinopathy without macular edema, with long-term current use of insulin, unspecified laterality (H)        Dose:  45 Units   Inject 45 Units Subcutaneous At Bedtime   Quantity:  3 mL   Refills:  11       melatonin 5 MG Caps   This may have changed:  how much to take   Used for:  Insomnia, unspecified type   Changed by:  Eugenia De Jesus RPH        Dose:  2 capsule   Take 2 capsules by mouth daily At dinnertime   Quantity:  180 capsule   Refills:  3            Where to get your medicines      These medications were sent to [address] [address]     Phone:  [phone]     melatonin 5 MG Caps                Primary Care Provider Office Phone # Fax #    Rowena ART Blanton [phone]       [address] [facility] [address]  [facility] [address]        Goals        General    Medical (pt-stated)     Notes - Note created  7/7/2016  9:15 AM by Chelsea Pulido, RN    Get blood sugars under control    Improve medication compliance    As of today's date 7/7/2016 goal is met at 0 - 25%.   Goal Status:  Active          Equal Access to Services     [facility]: Hadii samira ku hadasho Soomaali, waaxda luqadaha, qaybta kaalmada adeegyada, lorena toscano hayfernando ellison . So [facility] [phone].    ATENCIÓN: Si habla español, tiene a trinh disposición servicios gratuitos de asistencia lingüística. Llame al [phone].    We comply with applicable federal civil rights laws and [place] laws. We do not discriminate on the basis of race, color, national origin, age, disability, sex, sexual orientation, or gender identity.            Thank you!     Thank you for choosing [facility] PRIMARY CARE  for your care. Our goal is always to  provide you with excellent care. Hearing back from our patients is one way we can continue to improve our services. Please take a few minutes to complete the written survey that you may receive in the mail after your visit with us. Thank you!             Your Updated Medication List - Protect others around you: Learn how to safely use, store and throw away your medicines at www.disposemymeds.org.          This list is accurate as of 1/23/18 11:59 PM.  Always use your most recent med list.                   Brand Name Dispense Instructions for use Diagnosis    ACETAMINOPHEN PO      Take 1,000 mg by mouth every 6 hours as needed for pain or fever        albuterol 108 (90 BASE) MCG/ACT Inhaler    PROAIR HFA/PROVENTIL HFA/VENTOLIN HFA    3 Inhaler    Inhale 2 puffs into the lungs every 6 hours as needed for shortness of breath / dyspnea or wheezing    Dyspnea on exertion       amantadine 100 MG capsule    SYMMETREL    60 capsule    Take 1 capsule (100 mg) by mouth 2 times daily    Schizoaffective disorder, depressive type (H), Tardive dyskinesia       aspirin 81 MG EC tablet    ASPIRIN LOW DOSE    100 tablet    Take 1 tablet (81 mg) by mouth daily    Coronary artery disease involving native heart with angina pectoris, unspecified vessel or lesion type (H)       atorvastatin 80 MG tablet    LIPITOR    28 tablet    TAKE 1 TABLET (80MG) BY MOUTH DAILY    Hyperlipidemia LDL goal <100       blood glucose monitoring lancets     150 each    Use to test blood sugar 2 times daily or as directed.  Ok to substitute alternative if insurance prefers.    Type 2 diabetes mellitus with diabetic neuropathy, with long-term current use of insulin (H)       blood glucose monitoring test strip    ONETOUCH VERIO IQ    200 strip    Use to test blood sugar 4 times daily .  Ok to substitute alternative if insurance prefers.    Type 2 diabetes mellitus with diabetic neuropathy, with long-term current use of insulin (H)       clotrimazole 1 %  cream    LOTRIMIN     Apply topically 2 times daily        DIPHENDRYL PO      Take 25 mg by mouth every 6 hours as needed for itching        gabapentin 300 MG capsule    NEURONTIN    168 capsule    TAKE 2 CAPSULES (600MG) BY MOUTH THREE TIMES A DAY    Type 2 diabetes mellitus with diabetic neuropathy, with long-term current use of insulin (H)       glucose 4 G Chew chewable tablet     20 tablet    Take 2 every 15 minutes for blood sugar <70mg/dL. Recheck blood sugar every 15 minutes until above 70mg/dL, then eat a substantial meal.    Type 2 diabetes mellitus with mild nonproliferative retinopathy without macular edema, with long-term current use of insulin, unspecified laterality (H)       * HALOPERIDOL PO      Take 5 mg by mouth every 12 hours as needed for agitation        * haloperidol 5 MG tablet    HALDOL    60 tablet    Take 1 tablet (5 mg) by mouth 2 times daily    Schizoaffective disorder, depressive type (H)       ibuprofen 600 MG tablet    ADVIL/MOTRIN    60 tablet    Take 1 tablet (600 mg) by mouth every 4 hours as needed for moderate pain    Closed fracture of one rib of right side, initial encounter       insulin aspart 100 UNIT/ML injection    NovoLOG FLEXPEN    15 mL    Inject 20 Units Subcutaneous 3 times daily (with meals) Once daily, can add additional 5 units if BGs are >500mg/dL.    Type 2 diabetes mellitus with mild nonproliferative retinopathy without macular edema, with long-term current use of insulin, unspecified laterality (H)       insulin glargine 100 UNIT/ML injection    LANTUS    3 mL    Inject 45 Units Subcutaneous At Bedtime    Type 2 diabetes mellitus with mild nonproliferative retinopathy without macular edema, with long-term current use of insulin, unspecified laterality (H)       losartan 100 MG tablet    COZAAR    28 tablet    TAKE 1 TABLET (100MG) BY MOUTH DAILY    Coronary artery disease involving native heart with angina pectoris, unspecified vessel or lesion type (H)        melatonin 5 MG Caps     180 capsule    Take 2 capsules by mouth daily At dinnertime    Insomnia, unspecified type       metoprolol succinate 100 MG 24 hr tablet    TOPROL-XL    28 tablet    TAKE 1 TABLET (100MG) BY MOUTH DAILY    Coronary artery disease involving native heart with angina pectoris, unspecified vessel or lesion type (H)       NOVOFINE 30 30G X 8 MM   Generic drug:  insulin pen needle     100 each    USE 4 DAILY OR AS DIRECTED    Type 2 diabetes mellitus with mild nonproliferative retinopathy without macular edema, with long-term current use of insulin, unspecified laterality (H)       * order for DME     1 each    Hold Novolog if meals are refused.    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, with long-term current use of insulin, unspecified laterality (H)       polyethylene glycol powder    MIRALAX/GLYCOLAX    527 g    TAKE 17 GRAMS (1 CAPFUL) BY MOUTH DAILY    Constipation, unspecified constipation type       prochlorperazine 5 MG tablet    COMPAZINE    90 tablet    Take 1 tablet (5 mg) by mouth every 6 hours as needed for nausea or vomiting    Nausea       ranitidine 300 MG tablet    ZANTAC    90 tablet    TAKE 1 TABLET (300MG) BY MOUTH AT BEDTIME    Gastroesophageal reflux disease without esophagitis       senna-docusate 8.6-50 MG per tablet    SENOKOT-S;PERICOLACE     Take 1 tablet by mouth 2 times daily as needed for constipation        TRULICITY 1.5 MG/0.5ML pen   Generic drug:  dulaglutide     2 mL    INJECT 1.5MG SUBCUTANEOUSLY EVERY SEVEN DAYS    Type 2 diabetes mellitus with mild nonproliferative retinopathy without macular edema, with long-term current use of insulin, unspecified laterality (H)       * Notice:  This list has 4 medication(s)  that are the same as other medications prescribed for you. Read the directions carefully, and ask your doctor or other care provider to review them with you.

## 2018-01-23 NOTE — PATIENT INSTRUCTIONS
We have scheduled you a visit wit our Psychiatrist 2/5 at 9:30    Please work on moving every 2 hours    Please work on better snacks for your diabetes    We will get your strips ordered    We will have you see Rowena in one month     If your Miralax isn't helping with your constipation go ahead and take your Senna-s    We will try and increase your melatonin to 10mg daily to help with sleep

## 2018-01-23 NOTE — PROGRESS NOTES
SUBJECTIVE:                                                    Nena Tang is a 57 year old female who presents to clinic today for the following health issues:  [facility]: Richardson Lopez, PharmD- Eugenia Webster     **Pt states she was told by hospital that she is to check b/s 4 x per day. Pt requesting a note justifying this.    Hospital Follow-up Visit:    Hospital/Nursing Home/ Rehab Facility: [facility]  Date of Admission: 1/13/18  Date of Discharge:1/19/18  Reason(s) for Admission: Suicidal thoughts, low blood pressure            Problems taking medications regularly:  None       Medication changes since discharge: stopped her Ingrezza and Sertraline, increased her haldol to 5 mg 2x daily as well as needed, also stopped Chothidone        Problems adhering to non-medication therapy:  None    Summary of hospitalization:  [facility] discharge summary reviewed  Diagnostic Tests/Treatments reviewed.  Follow up needed: BMP  Other Healthcare Providers Involved in Patient s Care:         lives in a foster home, comes today with care giver  Update since discharge: improved.     Post Discharge Medication Reconciliation: discharge medications reconciled, continue medications without change.  Plan of care communicated with patient and caregiver     Coding guidelines for this visit:  Type of Medical   Decision Making Face-to-Face Visit       within 7 Days of discharge Face-to-Face Visit        within 14 days of discharge   Moderate Complexity [id number]   High Complexity [id number]        [facility]   Psychiatric Discharge Summary    Nena Tang was admitted on a 72 hour hold transition to [facility] for suicidal ideation and possible side effects on Ingrezza which is a new medication for tardive dyskinesia.  She is potentially having worsening tremors and dizziness.  This medication was discontinued and she was started on amantadine 100 mg twice daily  for parkinsonian symptoms and possibly for abnormal movements.  Her sertraline was discontinued for lack of benefits and efficacy.  She began using her CPAP machine while on the unit and was sleeping more appropriately at night and feeling more rested her energy subsequently increased and so does her concentration and her suicidal thoughts subsided.  She has not been using her CPAP machine while at the facility for some reason.  She states that it needs to be adjusted on her mask.  On day of discharge she feels better still having some auditory hallucinations though much improved and she is able to ignore it.  The medicine service would like her to have her blood pressure reevaluated in the outpatient setting and her blood sugar was slightly elevated at 151.  She is not hopeless or helpless not having any thoughts of harming herself or others sleeping well at night not paranoid about others and has a safety plan after her discharge.  She did have some constipation during her hospitalization and had a good bowel movement yesterday.  START taking        Dose / Directions     amantadine 100 MG capsule   Commonly known as:  SYMMETREL   Used for:  Schizoaffective disorder, depressive type (H), Tardive dyskinesia          Dose:  100 mg   Take 1 capsule (100 mg) by mouth 2 times daily   Quantity:  60 capsule   Refills:  0         STOP taking            INGREZZA 40 MG capsule   Generic drug:  valbenazine              SERTRALINE HCL PO                 Mental Health Follow up     Status since last visit: improved since hospitalization, taking new medication for shaking, amantadine,  but also stopped Sertaline and Ingrezza, haldol is helpful and that was increased to 5mg 2x daily as well as PRN, willing to switch Psychiatry here at [facility] and we scheduled her for 2/5, with Dr. Pedersen     See PHQ-9 for current symptoms.  Other associated symptoms: None    Complicating factors: Cognitive delay  Significant life event:  No   Current  substance abuse:  None  Anxiety or Panic symptoms:  No    Sleep - CPAP has helped, taking melatonin 5mg with no real improvement, tosses and turns, sleeps from 12-7pm, CPAP mask leaks now, has an appt for mask fitting    Appetite - good, eats too much, discussed diabetic diet  Exercise - walks short distances with a cane, gets too tired, walker was prescribed     Smoking - no  Alcohol - no  Street drugs - no  Marijuana - no  Caffeine - decaf, puts sugar in it     PHQ-9  English PHQ-9   Any Language           Diabetes Follow-up    Patient is checking blood sugars: four times daily.    Blood sugar testing frequency justification: Uncontrolled diabetes  Results are as follows:  4x daily, 150-300  Diabetic concerns: None and blood sugar frequently over 200     Symptoms of hypoglycemia (low blood sugar): none     Paresthesias (numbness or burning in feet) or sores: No     Date of last diabetic eye exam: scheduling eye surgery, eye exam scheduled 1/31, needs to get HgbA1c down before she can have surgery,     BP Readings from Last 2 Encounters:   01/23/18 130/80   01/17/18 113/49     Hemoglobin A1C (%)   Date Value   12/09/2017 8.9 (H)   11/07/2017 8.9 (H)     LDL Cholesterol Calculated (mg/dL)   Date Value   06/27/2017 64   07/14/2015 78     LDL Cholesterol Direct (mg/dL)   Date Value   07/13/2016 137 (H)         Problems taking medications regularly: No, facility setting up meds     Medication side effects: none  Diet: diabeticSocial History   Substance Use Topics     Smoking status: Never Smoker     Smokeless tobacco: Never Used     Alcohol use No      Comment: last month        Problem list and histories reviewed & adjusted, as indicated.  Additional history: as documented    Patient Active Problem List   Diagnosis     Osteopenia     Vitamin B12 deficiency without anemia     Hyperlipidemia LDL goal <100     Moderate major depression (H)     Rotator cuff syndrome     Type 2 diabetes mellitus with mild nonproliferative  retinopathy (H)     Illiterate     Irritable bowel syndrome     overweight - BMI >35     Takotsubo cardiomyopathy     CAD (coronary artery disease)     Restless legs syndrome (RLS)     CINDY (obstructive sleep apnea)- mild AHI 10.3     Verbal auditory hallucination     Chronic low back pain     Schizoaffective disorder, depressive type (H)     Migraine headache     HTN, goal below 140/90     Health Care Home     Lumbago     Cervicalgia     Cocaine abuse, episodic     Suicidal ideation     Esophageal reflux     Mild nonproliferative diabetic retinopathy (H)     Tardive dyskinesia     Alcohol use     Left cataract     Falls frequently     History of uterine cancer     Depression     Psychophysiological insomnia     Dysuria     Asymptomatic postmenopausal status     Abdominal pain, right lower quadrant     Sepsis (H)     Pneumonia of right lower lobe due to infectious organism (H)     Infectious encephalopathy     Non-intractable vomiting with nausea     Acute respiratory failure with hypoxia (H)     Thoughts of self harm     Gastroenteritis     Past Surgical History:   Procedure Laterality Date     C OOPHORECTORMY FOR RAHEL, W/BX  1983    UTERINE     CATARACT IOL, RT/LT Bilateral 2017     COLONOSCOPY N/A 3/16/2017    Procedure: COLONOSCOPY;  Surgeon: Traci Gonzalez MD;  Location:  GI     Coronary CTA  5/21/2014     HYSTERECTOMY  1983    uterine cancer yearly pap's per provider.     LAPAROSCOPIC CHOLECYSTECTOMY  2008     PHACOEMULSIFICATION CLEAR CORNEA WITH STANDARD INTRAOCULAR LENS IMPLANT Left 5/5/2017    Procedure: PHACOEMULSIFICATION CLEAR CORNEA WITH STANDARD INTRAOCULAR LENS IMPLANT;  LEFT EYE PHACOEMULSIFICATION CLEAR CORNEA WITH STANDARD INTRAOCULAR LENS IMPLANT ;  Surgeon: Tyra Diaz MD;  Location: [facility]     PHACOEMULSIFICATION CLEAR CORNEA WITH STANDARD INTRAOCULAR LENS IMPLANT Right 6/30/2017    Procedure: PHACOEMULSIFICATION CLEAR CORNEA WITH STANDARD INTRAOCULAR LENS IMPLANT;  RIGHT  EYE PHACOEMULSIFICATION CLEAR CORNEA WITH STANDARD INTRAOCULAR LENS IMPLANT;  Surgeon: Tyra Diaz MD;  Location:  EC     RELEASE TRIGGER FINGER  10/11/2012    Left thumb. Procedure: RELEASE TRIGGER FINGER;  LEFT THUMB TRIGGER RELEASE;  Surgeon: Tay Langley MD;  Location:  SD     RELEASE TRIGGER FINGER Right 2016    Procedure: RELEASE TRIGGER FINGER;  Surgeon: Albino Castañeda MD;  Location:  OR       Social History   Substance Use Topics     Smoking status: Never Smoker     Smokeless tobacco: Never Used     Alcohol use No      Comment: last month     Family History   Problem Relation Age of Onset     CANCER Mother      BLADDER     Respiratory Mother      COPD     GASTROINTESTINAL DISEASE Mother      CIRRHOSIS OF LI BOLIVAR     Alcohol/Drug Mother      DIABETES Mother      Hypertension Mother      Lipids Mother      C.A.D. Mother      Glaucoma Mother      Alcohol/Drug Sister      MENTAL ILLNESS Sister      Alcohol/Drug Sister      Psychotic Disorder Sister      CANCER Maternal Grandmother      UNKNOWN TYPE     CANCER Brother      COLON     Cancer - colorectal Brother      IN HIS LATE 30S     Alcohol/Drug Brother       OF HEROIN OVERDOSE AT AGE 22 YRS     Macular Degeneration No family hx of            Current Outpatient Prescriptions   Medication Sig Dispense Refill     blood glucose monitoring (ONETOUCH VERIO IQ) test strip Use to test blood sugar 4 times daily .  Ok to substitute alternative if insurance prefers. 200 strip 11     amantadine (SYMMETREL) 100 MG capsule Take 1 capsule (100 mg) by mouth 2 times daily 60 capsule 0     haloperidol (HALDOL) 5 MG tablet Take 1 tablet (5 mg) by mouth 2 times daily 60 tablet 0     ONETOUCH ULTRA test strip TEST TWICE DAILY 175 strip 3     NOVOFINE 30 30G X 8 MM insulin pen needle USE 4 DAILY OR AS DIRECTED 100 each 1     DiphenhydrAMINE HCl (DIPHENDRYL PO) Take 25 mg by mouth every 6 hours as needed for itching       clotrimazole (LOTRIMIN) 1 %  cream Apply topically 2 times daily       HALOPERIDOL PO Take 5 mg by mouth every 12 hours as needed for agitation       insulin glargine (LANTUS) 100 UNIT/ML injection Inject 45 Units Subcutaneous At Bedtime (Patient taking differently: Inject 45 Units Subcutaneous At Bedtime Lantus dose is 45 units Q HS) 3 mL 11     insulin aspart (NOVOLOG FLEXPEN) 100 UNIT/ML injection Inject 20 Units Subcutaneous 3 times daily (with meals) Once daily, can add additional 5 units if BGs are >500mg/dL. 15 mL 11     prochlorperazine (COMPAZINE) 5 MG tablet Take 1 tablet (5 mg) by mouth every 6 hours as needed for nausea or vomiting 90 tablet 0     ranitidine (ZANTAC) 300 MG tablet TAKE 1 TABLET (300MG) BY MOUTH AT BEDTIME 90 tablet 1     order for DME Diabetic Shoes 2 each 0     losartan (COZAAR) 100 MG tablet TAKE 1 TABLET (100MG) BY MOUTH DAILY 28 tablet 3     gabapentin (NEURONTIN) 300 MG capsule TAKE 2 CAPSULES (600MG) BY MOUTH THREE TIMES A  capsule 3     blood glucose monitoring (ONETOUCH VERIO IQ SYSTEM) meter device kit Use to test blood sugar 2 times daily or as directed.  Ok to substitute alternative if insurance prefers. 1 kit 0     blood glucose monitoring (ONE TOUCH DELICA) lancets Use to test blood sugar 2 times daily or as directed.  Ok to substitute alternative if insurance prefers. 150 each 11     vitamin D3 (CHOLECALCIFEROL) 02986 UNITS capsule TAKE 1 CAPSULE (50,000 UNITS) BY MOUTH ONCE A WEEK (Patient taking differently: TAKE 1 CAPSULE (50,000 UNITS) BY MOUTH ONCE A WEEK ON TUESDAYS) 4 capsule 3     albuterol (PROAIR HFA/PROVENTIL HFA/VENTOLIN HFA) 108 (90 BASE) MCG/ACT Inhaler Inhale 2 puffs into the lungs every 6 hours as needed for shortness of breath / dyspnea or wheezing 3 Inhaler 1     senna-docusate (SENOKOT-S;PERICOLACE) 8.6-50 MG per tablet Take 1 tablet by mouth 2 times daily as needed for constipation       ACETAMINOPHEN PO Take 1,000 mg by mouth every 6 hours as needed for pain or fever        atorvastatin (LIPITOR) 80 MG tablet TAKE 1 TABLET (80MG) BY MOUTH DAILY 28 tablet 11     metoprolol (TOPROL-XL) 100 MG 24 hr tablet TAKE 1 TABLET (100MG) BY MOUTH DAILY 28 tablet 11     polyethylene glycol (MIRALAX/GLYCOLAX) powder TAKE 17 GRAMS (1 CAPFUL) BY MOUTH DAILY 527 g 3     TRULICITY 1.5 MG/0.5ML pen INJECT 1.5MG SUBCUTANEOUSLY EVERY SEVEN DAYS 2 mL 11     aspirin (ASPIRIN LOW DOSE) 81 MG EC tablet Take 1 tablet (81 mg) by mouth daily 100 tablet 4     ibuprofen (ADVIL,MOTRIN) 600 MG tablet Take 1 tablet (600 mg) by mouth every 4 hours as needed for moderate pain 60 tablet 0     melatonin 5 MG CAPS Take 1 capsule by mouth daily At dinnertime (Patient taking differently: Take 1 capsule by mouth At Bedtime At dinnertime) 90 capsule 1     glucose 4 G CHEW Take 2 every 15 minutes for blood sugar <70mg/dL. Recheck blood sugar every 15 minutes until above 70mg/dL, then eat a substantial meal. 20 tablet 1     order for DME Hold [employer] if meals are refused. 1 each 0     order for DME Equipment being ordered: Rolling walker 1 Device 0     Allergies   Allergen Reactions     Imidazole Antifungals Hives     Tolerates diflucan     Ketoprofen Itching     Pruritis to topical     Lisinopril Hives     Metformin Other (See Comments)     Patient hospitalized for lactic acidosis - admitting provider suspectd caused by metformin     Metronidazole Hives     Posaconazole Hives     Tolerates diflucan     Recent Labs   Lab Test  01/14/18   1223  01/13/18   2315  12/09/17   0748  12/07/17 2019 11/07/17   0801  10/23/17   1515   06/27/17   1358   12/29/16   0909   07/13/16   1350   07/14/15   1215   09/03/13   1156   A1C   --    --   8.9*   --   8.9*  8.8*   < >  7.0*   < >   --    < >   --    < >  9.1*   < >  10.8*   LDL   --    --    --    --    --    --    --   64   --    --    --   137*   --   78   < >  90   HDL   --    --    --    --    --    --    --   37*   --    --    --    --    --   39*   --   37*   TRIG   --    --    --     --    --    --    --   267*   --    --    --    --    --   151*   --   171*   ALT   --   29   --    --   23  23   < >  32   < >  26   < >   --    < >   --    < >  38   CR  1.11*  1.62*   --   1.02  0.88  0.98   < >  0.78   < >  0.72   < >   --    < >  0.67   < >  0.54   GFRESTIMATED  51*  33*   --   56*  66  58*   < >  76   < >  83   < >   --    < >  >90  Non  GFR Calc     < >  >90   GFRESTBLACK  61  40*   --   68  80  71   < >  >90   GFR Calc     < >  >90   GFR Calc     < >   --    < >  >90   GFR Calc     < >  >90   POTASSIUM   --   4.0   --   3.3*  3.9  4.6   < >  4.3   < >  4.2   < >   --    < >  4.3   < >  4.4   TSH   --    --    --    --   3.21   --    --    --    --   2.24   < >   --    < >   --    < >  1.42    < > = values in this interval not displayed.      BP Readings from Last 3 Encounters:   01/17/18 113/49   01/14/18 109/61   12/29/17 108/66    Wt Readings from Last 3 Encounters:   01/18/18 219 lb 4.8 oz (99.5 kg)   12/29/17 221 lb 8 oz (100.5 kg)   12/09/17 225 lb (102.1 kg)        Labs reviewed in EPIC  Problem list, Medication list, Allergies, and Medical/Social/Surgical histories reviewed in [facility] and updated as appropriate.     ROS: Constitutional, neuro, ENT, endocrine, pulmonary, cardiac, gastrointestinal, genitourinary, musculoskeletal, integument and psychiatric systems are negative, except as otherwise noted above in the HPI.       OBJECTIVE:                                                    LMP 01/06/2015  Body mass index is 44.09 kg/(m^2)./80, P 80 R 16  GENERAL: healthy, alert, well nourished, well hydrated, no distress  EYES: Eyes grossly normal to inspection,   HENT: ear canals- normal; TMs- normal; Nose- normal; Mouth- no ulcers, no lesions  NECK: no tenderness, no adenopathy, no asymmetry, no masses, no stiffness; thyroid- normal to palpation  RESP: lungs clear to auscultation - no rales, no rhonchi, no  wheezes  CV: regular rates and rhythm, normal S1 S2, no S3 or S4 and no murmur, no click or rub  ABDOMEN: soft, no tenderness, no  hepatosplenomegaly, no masses, normal bowel sounds  MS: extremities- no gross deformities noted, no edema  SKIN: no suspicious lesions, no rashes  NEURO: strength and tone- normal, sensory exam- grossly normal, mentation- intact, speech- normal, reflexes- symmetric  Non focal no aphasia. No facial asymmetry.  Mental Status Assessment:  Appearance:   Appropriate   Eye Contact:   Fair   Psychomotor Behavior: Restless   Attitude:   Cooperative  Guarded  Suspicious   Orientation:   Person Place  Speech   Rate / Production: Slow    Volume:  Soft   Mood:    Anxious   Affect:    Flat   Thought Content:  Hallucinations  Paranoia   Thought Form:  Flight of Ideas  Tangential   Insight:    Poor   Attention Span and Concentration:  limited  Recent and Remote Memory:  intact  Fund of Knowledge: appropriate  Muscle Strength and Tone: normal   Suicidal Ideation: reports no thoughts, no intention  Hallucination: Yes  Paranoid-Yes  Manic-No  Panic-No  Self harm-No      See [facility] notes Don Lopez    Diagnostic Test Results:  Results for orders placed or performed in visit on 01/23/18   Basic metabolic panel  (Ca, Cl, CO2, Creat, Gluc, K, Na, BUN)   Result Value Ref Range    Sodium 140 133 - 144 mmol/L    Potassium 4.1 3.4 - 5.3 mmol/L    Chloride 106 94 - 109 mmol/L    Carbon Dioxide 28 20 - 32 mmol/L    Anion Gap 6 3 - 14 mmol/L    Glucose 86 70 - 99 mg/dL    Urea Nitrogen 16 7 - 30 mg/dL    Creatinine 0.91 0.52 - 1.04 mg/dL    GFR Estimate 64 >60 mL/min/1.7m2    GFR Estimate If Black 77 >60 mL/min/1.7m2    Calcium 9.3 8.5 - 10.1 mg/dL     *Note: Due to a large number of results and/or encounters for the requested time period, some results have not been displayed. A complete set of results can be found in Results Review.        ASSESSMENT/PLAN:                                                    ASSESSMENT  / PLAN:  (Z09) Hospital discharge follow-up  (primary encounter diagnosis)  Comment: stable at this time has a follow up with her previous psychiatrist this week, lab follow up requested in the hospital, sleep issues continue   Plan: Basic metabolic panel  (Ca, Cl, CO2, Creat,         Gluc, K, Na, BUN)        Lab ordered, increase in melatonin to 10mg at bedtime     (F25.1) Schizoaffective disorder, depressive type (H)  Comment: has follow up with previous Psychiatrist this week, agrees to switch to [facility] psychiatry    Plan: Appt scheduled with Dr. Pedersen 2/5. 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 macular edema, with long-term current use of insulin, unspecified laterality (H)  Comment: high random sugars, last HGBA1c  but needs to test 4x daily  Plan: PharmD will contact Pharmacy about test strips, diet and exercise discussed      (K59.01) Slow transit constipation  Comment: stable on miralax   Plan: ok to take Senna-s in no BM in 3 days    Patient Instructions   We have scheduled you a visit wit our Psychiatrist 2/5 at 9:30    Please work on moving every 2 hours    Please work on better snacks for your diabetes    We will get your strips ordered    We will have you see Rowena in one month     If your Miralax isn't helping with your constipation go ahead and take your Senna-s    We will try and increase your melatonin to 10mg daily to help with sleep      Wendy Tang, DIONISIO, APRN CNP  [facility]

## 2018-01-24 ENCOUNTER — TELEPHONE (OUTPATIENT)
Dept: FAMILY MEDICINE | Facility: CLINIC | Age: 58
End: 2018-01-24

## 2018-01-24 DIAGNOSIS — E55.9 VITAMIN D DEFICIENCY: ICD-10-CM

## 2018-01-24 LAB
ANION GAP SERPL CALCULATED.3IONS-SCNC: 6 MMOL/L (ref 3–14)
BUN SERPL-MCNC: 16 MG/DL (ref 7–30)
CALCIUM SERPL-MCNC: 9.3 MG/DL (ref 8.5–10.1)
CHLORIDE SERPL-SCNC: 106 MMOL/L (ref 94–109)
CO2 SERPL-SCNC: 28 MMOL/L (ref 20–32)
CREAT SERPL-MCNC: 0.91 MG/DL (ref 0.52–1.04)
GFR SERPL CREATININE-BSD FRML MDRD: 64 ML/MIN/1.7M2
GLUCOSE SERPL-MCNC: 86 MG/DL (ref 70–99)
POTASSIUM SERPL-SCNC: 4.1 MMOL/L (ref 3.4–5.3)
SODIUM SERPL-SCNC: 140 MMOL/L (ref 133–144)

## 2018-01-24 RX ORDER — CHOLECALCIFEROL (VITAMIN D3) 1250 MCG
CAPSULE ORAL
Qty: 4 CAPSULE | Refills: 3 | Status: SHIPPED | OUTPATIENT
Start: 2018-01-24 | End: 2018-03-13

## 2018-01-24 NOTE — TELEPHONE ENCOUNTER
Incoming call from pt's group home stating that blood glucose monitoring (ONETOUCH VERIO IQ) test strip has a very high co-pay, and they would like it  switched to something affordable.   Pt will run out of strips tomorrow morning.     Please send new script to [facility], [place] on [address]    Please follow up. Contact info: [phone], ask for Zheng Valenzuela

## 2018-01-24 NOTE — TELEPHONE ENCOUNTER
PCP Rowena Haas,    Is the high dose Vit D intended long term? Last Vit D level was in range and was  done on 11/7/17  If so please sign rx or advise    Thanks,    Rosina Rao, RN, BSN

## 2018-01-24 NOTE — TELEPHONE ENCOUNTER
Last Written Prescription Date:  09/18/17  Last Fill Quantity: 4,  # refills: 3   Last Office Visit with G, P or [facility] prescribing provider:  01/23/18   Future Office Visit:    Next 5 appointments (look out 90 days)     Feb 06, 2018  2:30 PM CST   [facility]    [address]  [address]  [facility] [address]   [phone]            Feb 20, 2018  3:00 PM CST   SHORT with Eugenia De Jesus [facility] ([facility])    [address] [place] [address]  [address]  [facility] [address]   [phone]            Feb 20, 2018  3:30 PM CST   Return Visit with RAT Arias CNP   [facility] ([facility]    [address]  [address]  [facility] [address]   [phone]            Feb 20, 2018  3:30 PM CST   Return Visit with Richardson Lopez [facility] ([facility])    [address]  [address]  [facility] [address]   [phone]                Requested Prescriptions   Pending Prescriptions Disp Refills     vitamin D3 (CHOLECALCIFEROL) 68882 UNITS capsule 4 capsule 3    There is no refill protocol information for this order

## 2018-01-25 ENCOUNTER — TELEPHONE (OUTPATIENT)
Dept: FAMILY MEDICINE | Facility: CLINIC | Age: 58
End: 2018-01-25

## 2018-01-25 NOTE — TELEPHONE ENCOUNTER
There is also a Medicare form in PCP's box that needs to be completed and signed by PCP and sent back ASAP.

## 2018-01-25 NOTE — TELEPHONE ENCOUNTER
Rowena,   Please see notes below. Medicare Forms in box. Needs completion and signature.     Thanks! Aniya Baron RN

## 2018-01-26 NOTE — TELEPHONE ENCOUNTER
Spoke with Zheng at the  regarding glucose monitoring strips.  The issue is not to switch the type of strips, but to sign the medical necessity certificate that reflects the need for qid testing.  Spoke with the pharmacy staff at [facility] and they confirmed this.  Staff noted that the form from the insurance company has not yet been faxed to them.  When they receive it, they will fax to [facility] for signing. Sakshi Viera RN January 26, 2018 12:41 PM

## 2018-01-29 PROBLEM — F43.10 POSTTRAUMATIC STRESS DISORDER: Status: ACTIVE | Noted: 2018-01-29

## 2018-01-31 NOTE — TELEPHONE ENCOUNTER
Incoming call from Kalie with group home checking on the status of forms below.  Kalie contact info: [phone]      Giorgi Case

## 2018-01-31 NOTE — TELEPHONE ENCOUNTER
[facility] from [facility] stating that pt's insurance did no receive the form. Writer checked pt's chart, but form has not been scanned yet. Will check again this week and next week, and re-fax the form when it is scanned.     Val Valenzuela

## 2018-02-01 ENCOUNTER — TELEPHONE (OUTPATIENT)
Dept: FAMILY MEDICINE | Facility: CLINIC | Age: 58
End: 2018-02-01

## 2018-02-01 NOTE — TELEPHONE ENCOUNTER
Writer spoke with Group home employee who reported that patient did not test BG before breakfast. BG tested after breakfast and result was 123. Group home employee was wondering if it is ok to to give 20 units of insulin. No sliding scale or parameters in sig. Writer directed group home employee to instruct patient to take the 20 units as patient BG would most like ly be increasing as food was being metabolized and this is what is prescribed:    insulin aspart (NOVOLOG FLEXPEN) 100 UNIT/ML injection 15 mL 11 12/29/2017  No     Sig: Inject 20 Units Subcutaneous 3 times daily (with meals) Once daily, can add additional 5 units if BGs are >500mg/dL.      Patient instructed to recheck in 30 minutes to ensure safety. Group home Employee, Alva,  stated that patient did so then left to go to adult day care. Writer stated that patient must be monitored and report what BG will be in 30 minutes to ensure safety. Alva stated she would call patient ain 30 minutes to find out the number of BG. If patient symptomatic of Hypoglycemia or number in below 70, patient is to drink juice and recheck BG in 15 minutes. Writer instructed that group home employee speak with whomever is in charge at adult  to give patient juice if it is necessary and to monitor patient. Alva stated that the adult day care in not a skilled facility. Writer stated that no skills are necessary to give patient juice. It is the responsibility of the group home to speak with the patient to ensure BG is taken and the BG is wnl. Group home employee in agreement with plan and verbalizes understanding.   Thanks!   Aniya Baron RN    The other request per the Alva was to follow up on the testing strips which were not being covered by the patient insurance. Insurance stated they did not receive a letter of necessity for patient to test QID. Writer called [employer] requested another form. The form was faxed and Writer huddled with ART Lobo who  filled out the letter. Writer faxed the required form to the number that was requested it be faxed to [phone]. Writer confirmed that the fax was successful. Writer called [facility] to inform them that the form was sent to the insurance company and the fax was successful. [facility] pharmacy verbalized understanding.      Writer spoke with Alva and alerted her to the form being faxed and successful to the insurance company. The completion of this order is in the hands of the insurance company and they should be called if any other issues. Alva in  verbalizes understanding. Alva updated writer that BG taken at 10:30 was 160.   Thanks!   Aniya Braon RN

## 2018-02-05 ENCOUNTER — TELEPHONE (OUTPATIENT)
Dept: FAMILY MEDICINE | Facility: CLINIC | Age: 58
End: 2018-02-05

## 2018-02-05 ENCOUNTER — OFFICE VISIT (OUTPATIENT)
Dept: PSYCHIATRY | Facility: CLINIC | Age: 58
End: 2018-02-05
Payer: MEDICARE

## 2018-02-05 DIAGNOSIS — F25.0 SCHIZOAFFECTIVE DISORDER, BIPOLAR TYPE (H): Primary | ICD-10-CM

## 2018-02-05 PROCEDURE — 99214 OFFICE O/P EST MOD 30 MIN: CPT | Performed by: PSYCHIATRY & NEUROLOGY

## 2018-02-05 RX ORDER — PALIPERIDONE 6 MG/1
6 TABLET, EXTENDED RELEASE ORAL EVERY MORNING
Qty: 30 TABLET | Refills: 0 | Status: SHIPPED | OUTPATIENT
Start: 2018-02-05 | End: 2018-02-27

## 2018-02-05 NOTE — TELEPHONE ENCOUNTER
Patient's PCA Alva called.  At visit today, patient's Haldol was stopped and patient was started on Invega.  See screenshot from AVS with Dr. Pedersen below:      Per Alva, the pharmacy told her that they could not dispense the Invega because it requires a prior authorization.  Alva would like to know what to do.      Does Dr. Pedersen want patient to continue on haldol until invega goes through PA process?    Any orders need to be written and faxed to [phone].  Alva cannot take verbal orders over the phone.  But she can be reached at [phone]

## 2018-02-05 NOTE — MR AVS SNAPSHOT
MRN:[id number]                      After Visit Summary   2/5/2018    Nena Tang    MRN: [id number]           Visit Information        Provider Department      2/5/2018 11:00 AM Kraig Pedersen MD [facility]        Your next 10 appointments already scheduled     Feb 06, 2018  2:30 PM CST   Return Visit with Richardson Lopez, [facility] ([facility])    [address]  [address]  [facility] [address]   [phone]            Feb 20, 2018  3:00 PM CST   SHORT with Eugenia De Jesus [facility] [place] ([facility])    [address] [facility]  [address]  [facility] [address] [phone]            Feb 20, 2018  3:30 PM CST   [place] ART [facility]    [address]  [address]  [facility] [address] [phone]            Feb 20, 2018  3:30 PM CST   Return Visit with Richardson Lopez, [facility] ([facility])    [address]  [address]  [facility] [address] [phone]              Care Instructions    - Stop Haldol due to EPS  - Start Invega (Paliperidone) 6mg by mouth once daily.  - Continue other medications.  - I was informed of the [facility] where [facility] received and extra 50mg of Sertraline every day for four days. Sertraline was stopped at the [facility] facility.  Recheck in 4 weeks.  NATHAN Zavala    24/7 Crisis Hotlines:    National Suicide Prevention Hotline   323-254-FGDJ (0875)    Crisis Hotlines by County:    [place] Crisis Line     343.217.9532  [place] Crisis Line   999.896.9161  [place] Crisis Line     [phone]  [place] COPE for  Adults   [phone]  [facility]    [phone]  [facility] for [facility]    [phone]  [facility]    [phone]  [facility] Crisis Line    [phone]    [facility] Crisis Response Team  [phone] ([phone])  [facility] Crisis is available 24 hours a day. The team provides callers with a needs assessment and referrals to appropriate resources. If medically necessary, the Crisis Response Team assists individuals by traveling to their location to provide face-to-face interventions and assessments. Crisis services are available for adults and children in [facility] and [facility]. Adult Crisis beds are also available if appropriate.    Walk-In Centers and Mobile Teams  [facility] Walk-In Crisis Intervention  [phone]  [facility] COPE (18+) Crisis Mobile Team    [phone]  [facility] Child (under 17) Crisis Mobile Team [phone]  [facility] for Adults Walk-In & Mobile Teams [phone]    Additional Crisis Hotlines:  Bridge for Youth      887.230.8296  Crisis Connection      [phone]  [facility] ([facility] Crisis Services)  [phone]  [facility] Social Service ([facility])    [phone]  Men s Crisis Line      697-562-LYRI (468-344-1719)  National Suicide Prevention Hotline   233-138-UQJZ (7782)  [facility] Crisis Line    818.963.4440  Suicide Hotline      272.129.4485  [facility] (formerly [facility] for Help)  Dial 2-1-8 (133-207-3863)    Domestic Violence, Rape and Sexual Abuse Hotlines:  [place] Crisis Line     491.719.3899  [place]: [facility] Helpline  [phone]  Domestic Abuse Project (DAP)    8-995-386-2284*  [place] Crisis Line     [phone]  [place] [facility]  3-202-626-2331*  [place] [facility]       728.724.3619 (7-929-589-9882*)  National Domestic Violence Hotline   8-930-964-SAFE  Rape/Sexual Abuse Center Crisis Line     622.478.3910   Sexual Violence Center ([facility])    [phone]  [facility] [employer]     214.948.5342 ([phone]*)           [facility] Information     [facility] gives you secure access to your electronic health record. If you see a primary care provider, you can also send messages to your care team and make appointments. If you have questions, please call your primary care clinic.  If you do not have a primary care provider, please call [phone] and they will assist you.        Care EveryWhere ID     This is your Care EveryWhere ID. This could be used by other organizations to access your [facility] medical records  [id number]        Equal Access to Services     RAMESH GARRIDO : Azul Rios, luis napier, lorena galvin. So [facility] [phone].    ATENCIÓN: Si habla español, tiene a trinh disposición servicios gratuitos de asistencia lingüística. Llame al [phone].    We comply with applicable federal civil rights laws and [place] laws. We do not discriminate on the basis of race, color, national origin, age, disability, sex, sexual orientation, or gender identity.

## 2018-02-05 NOTE — PATIENT INSTRUCTIONS
- Stop Haldol due to EPS  - Start Invega (Paliperidone) 6mg by mouth once daily.  - Continue other medications.  - I was informed of the [facility] where she received and extra 50mg of Sertraline every day for four days. Sertraline was stopped at the [facility] facility.  Recheck in 4 weeks.  NATHAN Zavala    24/7 Crisis Hotlines:    National Suicide Prevention Hotline   424-045-GLSK (3679)    Crisis Hotlines by County:    [facility] Crisis Line     175.275.9028  [place] Crisis Line   683.697.9783  [facility] Crisis Line     [phone]  [facility] COPE for Adults   [phone]  [facility] for Children    [phone]  [facility] for Adults    [phone]  [place] for Children    [phone]  [facility] Crisis Line    [phone]    [facility] Crisis Response Team  [phone] ([phone])  [facility] Crisis is available 24 hours a day. The team provides callers with a needs assessment and referrals to appropriate resources. If medically necessary, the Crisis Response Team assists individuals by traveling to their location to provide face-to-face interventions and assessments. Crisis services are available for adults and children in [facility]. Adult Crisis beds are also available if appropriate.    Walk-In Centers and Mobile Teams  [facility] Walk-In Crisis Intervention  [phone]  [facility] COPE (18+) Crisis Mobile Team    [phone]  [facility] Child (under 17) Crisis Mobile Team [phone]  [facility] UrgentCare for Adults Walk-In & Mobile Teams [phone]    Additional Crisis Hotlines:  Bridge for Youth      943.941.4047  Crisis Connection      191.381.9092  [facility] ([facility] Crisis Services)  [phone]  [facility] Social Service (S)    [phone]  Men s Crisis Line      565-795-IPSN (986-335-6993)  National Suicide Prevention Hotline   672-271-MTUL  (8974)  [facility] Crisis Line    533.281.7467  Suicide Hotline      465.161.7090  [facility] (formerly [facility])  Dial 2-1-16 (901-195-3959)    Domestic Violence, Rape and Sexual Abuse Hotlines:  [place] Crisis Line     [phone]  [place] Helpline  [phone]  Domestic Abuse Project (DAP)    4-704-627-4580*  [facility] Crisis Line     [phone]  [place] [facility]  2-846-658-1988*  [place] [facility]       234.629.3627 (8-383-247-2990*)  National Domestic Violence Hotline   0-429-913-SCZT  Rape/Sexual Abuse Center Crisis Line    435.163.8616   Sexual Violence Center ([facility])    [phone]  [facility] [employer]     [phone] ([phone]*)

## 2018-02-05 NOTE — TELEPHONE ENCOUNTER
Letter written as order for pt to continue haldol until PA is approved for Invega. Signed and faxed to [facility] at group home to number provided below.  Rina Norris RN

## 2018-02-05 NOTE — TELEPHONE ENCOUNTER
Prior Authorization Retail Medication Request  Medication/Dose: Invega 6 mg   Diagnosis and ICD code: F25.0  New/Renewal/Insurance Change PA: new  Previously Tried and Failed Therapies:     Insurance ID (if provided): Submit via [employer]  Key: [id number]  Insurance Phone (if provided):    Any additional info from fax request:     If you received a fax notification from an outside Pharmacy:  Pharmacy Name: [facility]   Pharmacy #:[phone]  Pharmacy Fax:[phone]

## 2018-02-05 NOTE — LETTER
[facility]  [address]  [address]  [facility] [address]  [phone]          February 5, 2018    Nena Tang                                                                                                                     [address] [place] [id number]            To Whom It May Concern,    This is a written order for patient Nena Tang to continue on Haldol 5 mg BID until prior authorization is approved for Invega 6 mg daily.    Sincerely,         Rowena Haas CNP

## 2018-02-05 NOTE — PROGRESS NOTES
"  [facility] Initial Psychiatry Visit              Nena Tang is a 58 year old female who was referred by her primary care provider for treatment and evaluation of depression and psychosis  Therapist: N/A  PCP: Rowena Haas  Other Providers: N/A     History was provided by patient and care taker Zheng Barnes (tel # [phone]) who was a limited historian.     Chief Complaint                                                                                                             \" I have depression \"     History of Present Illness                                                                                   4, 4      Pertinent Background:  See section specific documentation.  Psych critical item history includes suicidal ideation, psychosis [sxs include hallucinations], mutiple psychotropic trials, psych hosp (>5) and SUBSTANCE USE: cocaine and alcohol .   Most recent history began approximately 20 days ago. She has a history of Schizoaffective Disorder. She was hospitalized at [facility] for suicidal ideation and lethargy after being started on Ingrezza by her outpatient psychiatrist Dr. Karen See for tremors. During this hospitalization Ingrezza was stopped and Haldol 5mg po BID was continued. I was informed by her caretaker Zheng that following her discharge from inpatient she was inadvertently administered a higher dose of Zoloft for days and Zoloft was discontinued on 2/5/18.     At her visit today she reported that she was feeling much better compared to how she was feeling when she went into the hospital. She reported occasional passive thoughts of suicide without intent or plan. She cited her family as protective factors against suicide. She described seeing a demonic figure in a cape at night who would tell her to hurt herself. She was tearful when discussing her hallucinations. She complained of tremors of her hands and had visible shaking of her " "right leg.    Recent Symptoms:   Depression:  occasional passive suicidal thoughts without intent or plan, depressed mood, low energy, poor concentration /memory and feeling worthless  Psychosis:  auditory hallucinations without commands [details in Interim History] and visual hallucinations [details in Interim History]  ADVERSE EFFECTS: tremors of the hands and shaking of the legs       Recent Substance Use:  none reported     Substance Use History                                                                 According to inpatient HPI dated 1/14/18 \"The patient denied tobacco use.  She has a history of heavy cocaine and alcohol use for about 15-16 years but has been sober for over 20 years.  No history of methamphetamine or heroin use.  She drinks about a pot of coffee per day.\"        Psychiatric History     She reported numerous previous psychiatric hospitalizations in the past for depression and psychosis. Approx 12 times. She reported no history of mental health commitments or ECT.       Psychiatric Medication Trials     Zoloft (sertraline) and Celexa (citalopram)  Zyprexa (olanzapine), Seroquel (quetiapine) and Abilify (aripriprazole)  Haldol (haloperidol)  Ambien (zolpidem)                                                         Social/ Family History               [per patient report]                                                       1ea, 1ea     She grew up in [place]. She has a 11thgrade level of education. She was in Special Education classes. She is currently on SSDI and lives in an Adult Foster [facility] Facility. She is  for the last 3-4 years and was  about 12 years. She has two adult sons. She reported no history of abuse in her life.    Medical / Surgical History                                                                                                                     Patient Active Problem List   Diagnosis     Osteopenia     Vitamin B12 deficiency without anemia     " Hyperlipidemia LDL goal <100     Rotator cuff syndrome     Type 2 diabetes mellitus with mild nonproliferative retinopathy (H)     Illiterate     Irritable bowel syndrome     overweight - BMI >35     Takotsubo cardiomyopathy     CAD (coronary artery disease)     Restless legs syndrome (RLS)     CINDY (obstructive sleep apnea)- mild AHI 10.3     Verbal auditory hallucination     Chronic low back pain     Schizoaffective disorder, depressive type (H)     Migraine headache     HTN, goal below 140/90     Health Care Home     Lumbago     Cervicalgia     Cocaine abuse, episodic     Suicidal ideation     Esophageal reflux     Mild nonproliferative diabetic retinopathy (H)     Tardive dyskinesia     Alcohol use     Left cataract     Falls frequently     History of uterine cancer     Psychophysiological insomnia     Dysuria     Asymptomatic postmenopausal status     Abdominal pain, right lower quadrant     Sepsis (H)     Pneumonia of right lower lobe due to infectious organism (H)     Infectious encephalopathy     Non-intractable vomiting with nausea     Acute respiratory failure with hypoxia (H)     Thoughts of self harm     Gastroenteritis     Posttraumatic stress disorder       Past Surgical History:   Procedure Laterality Date     C OOPHORECTORMY FOR RAHEL, W/BX  1983    UTERINE     CATARACT IOL, RT/LT Bilateral 2017     COLONOSCOPY N/A 3/16/2017    Procedure: COLONOSCOPY;  Surgeon: Traci Gonzalez MD;  Location:  GI     Coronary CTA  5/21/2014     HYSTERECTOMY  1983    uterine cancer yearly pap's per provider.     LAPAROSCOPIC CHOLECYSTECTOMY  2008     PHACOEMULSIFICATION CLEAR CORNEA WITH STANDARD INTRAOCULAR LENS IMPLANT Left 5/5/2017    Procedure: PHACOEMULSIFICATION CLEAR CORNEA WITH STANDARD INTRAOCULAR LENS IMPLANT;  LEFT EYE PHACOEMULSIFICATION CLEAR CORNEA WITH STANDARD INTRAOCULAR LENS IMPLANT ;  Surgeon: Tyra Diaz MD;  Location: [facility]     PHACOEMULSIFICATION CLEAR CORNEA WITH STANDARD  INTRAOCULAR LENS IMPLANT Right 6/30/2017    Procedure: PHACOEMULSIFICATION CLEAR CORNEA WITH STANDARD INTRAOCULAR LENS IMPLANT;  RIGHT EYE PHACOEMULSIFICATION CLEAR CORNEA WITH STANDARD INTRAOCULAR LENS IMPLANT;  Surgeon: Tyra Diaz MD;  Location:  EC     RELEASE TRIGGER FINGER  10/11/2012    Left thumb. Procedure: RELEASE TRIGGER FINGER;  LEFT THUMB TRIGGER RELEASE;  Surgeon: Tay Langley MD;  Location:  SD     RELEASE TRIGGER FINGER Right 12/26/2016    Procedure: RELEASE TRIGGER FINGER;  Surgeon: Albino Castañeda MD;  Location:  OR      Medical Review of Systems                                                                                                       2, 10     A comprehensive review of systems was performed and is negative other than noted in the HPI.    Allergy                                Imidazole antifungals; Ketoprofen; Lisinopril; Metformin; Metronidazole; and Posaconazole  Current Medications                                                                                                         Current Outpatient Prescriptions   Medication Sig Dispense Refill     acetaminophen (TYLENOL) 500 MG tablet Take 2 tablets (1,000 mg) by mouth 3 times daily as needed for mild pain 100 tablet 0     vitamin D3 (CHOLECALCIFEROL) 46222 UNITS capsule TAKE 1 CAPSULE (50,000 UNITS) BY MOUTH ONCE A WEEK 4 capsule 3     melatonin 5 MG CAPS Take 2 capsules by mouth daily At dinnertime 180 capsule 3     blood glucose monitoring (ONETOUCH VERIO IQ) test strip Use to test blood sugar 4 times daily .  Ok to substitute alternative if insurance prefers. 200 strip 11     amantadine (SYMMETREL) 100 MG capsule Take 1 capsule (100 mg) by mouth 2 times daily 60 capsule 0     haloperidol (HALDOL) 5 MG tablet Take 1 tablet (5 mg) by mouth 2 times daily 60 tablet 0     NOVOFINE 30 30G X 8 MM insulin pen needle USE 4 DAILY OR AS DIRECTED 100 each 1     DiphenhydrAMINE HCl (DIPHENDRYL PO) Take 25 mg by  mouth every 6 hours as needed for itching       clotrimazole (LOTRIMIN) 1 % cream Apply topically 2 times daily       HALOPERIDOL PO Take 5 mg by mouth every 12 hours as needed for agitation       insulin glargine (LANTUS) 100 UNIT/ML injection Inject 45 Units Subcutaneous At Bedtime (Patient taking differently: Inject 45 Units Subcutaneous At Bedtime Lantus dose is 45 units Q HS) 3 mL 11     insulin aspart (NOVOLOG FLEXPEN) 100 UNIT/ML injection Inject 20 Units Subcutaneous 3 times daily (with meals) Once daily, can add additional 5 units if BGs are >500mg/dL. 15 mL 11     prochlorperazine (COMPAZINE) 5 MG tablet Take 1 tablet (5 mg) by mouth every 6 hours as needed for nausea or vomiting 90 tablet 0     ranitidine (ZANTAC) 300 MG tablet TAKE 1 TABLET (300MG) BY MOUTH AT BEDTIME 90 tablet 1     order for DME Diabetic Shoes 2 each 0     losartan (COZAAR) 100 MG tablet TAKE 1 TABLET (100MG) BY MOUTH DAILY 28 tablet 3     gabapentin (NEURONTIN) 300 MG capsule TAKE 2 CAPSULES (600MG) BY MOUTH THREE TIMES A  capsule 3     blood glucose monitoring (ONE TOUCH DELICA) lancets Use to test blood sugar 2 times daily or as directed.  Ok to substitute alternative if insurance prefers. 150 each 11     albuterol (PROAIR HFA/PROVENTIL HFA/VENTOLIN HFA) 108 (90 BASE) MCG/ACT Inhaler Inhale 2 puffs into the lungs every 6 hours as needed for shortness of breath / dyspnea or wheezing 3 Inhaler 1     senna-docusate (SENOKOT-S;PERICOLACE) 8.6-50 MG per tablet Take 1 tablet by mouth 2 times daily as needed for constipation       ACETAMINOPHEN PO Take 1,000 mg by mouth every 6 hours as needed for pain or fever       atorvastatin (LIPITOR) 80 MG tablet TAKE 1 TABLET (80MG) BY MOUTH DAILY 28 tablet 11     metoprolol (TOPROL-XL) 100 MG 24 hr tablet TAKE 1 TABLET (100MG) BY MOUTH DAILY 28 tablet 11     polyethylene glycol (MIRALAX/GLYCOLAX) powder TAKE 17 GRAMS (1 CAPFUL) BY MOUTH DAILY 527 g 3     TRULICITY 1.5 MG/0.5ML pen INJECT  1.5MG SUBCUTANEOUSLY EVERY SEVEN DAYS 2 mL 11     aspirin (ASPIRIN LOW DOSE) 81 MG EC tablet Take 1 tablet (81 mg) by mouth daily 100 tablet 4     ibuprofen (ADVIL,MOTRIN) 600 MG tablet Take 1 tablet (600 mg) by mouth every 4 hours as needed for moderate pain 60 tablet 0     glucose 4 G CHEW Take 2 every 15 minutes for blood sugar <70mg/dL. Recheck blood sugar every 15 minutes until above 70mg/dL, then eat a substantial meal. 20 tablet 1     order for DME Hold Novolog if meals are refused. 1 each 0     Vitals                                                                                                                           3, 3     LMP 01/06/2015     Mental Status Exam                                                                                       9, 14 cog gs     Alertness: alert   Appearance: casually groomed  Behavior/Demeanor: cooperative, with good  eye contact   Speech: normal  Language: intact  Psychomotor: tremor, shaking of right leg.  Mood: depressed  Affect: full range; was congruent to mood; was congruent to content  Thought Process/Associations: unremarkable  Thought Content:  Reports none;  Denies suicidal ideation and violent ideation  Perception:  Reports none;  Denies auditory hallucinations and visual hallucinations  Insight: limited  Judgment: limited  Cognition: (6) does  appear grossly intact; formal cognitive testing was not done  grossly oriented to her circumstances  Gait and Station: unremarkable     Suicide Risk Assessment:  Today Nena Tang reports no suicidal ideation and no homicidal ideation. In addition, she has notable risk factors for self-harm, including single status and psychosis. However, risk is mitigated by commitment to family and history of seeking help when needed. Therefore, based on all available evidence including the factors cited above, she does not appear to be at imminent risk for self-harm, does not meet criteria for a 72-hr hold, and therefore  remains appropriate for ongoing outpatient level of care.     Labs and Data                                                                                                                     Rating Scales:  N/A    PHQ9   PHQ-9 SCORE 12/20/2016 1/2/2017 2/3/2017   Total Score - - -   Total Score - - -   Total Score 7 0 0     Recent Labs   Lab Test  01/23/18   1526  01/14/18   1223  01/13/18   2315   CR  0.91  1.11*  1.62*   GFRESTIMATED  64  51*  33*     Recent Labs   Lab Test  01/13/18   2315  11/07/17   0801   AST  15  13   ALT  29  23   ALKPHOS  115  114       Diagnosis and Assessment                                                                               m2, h3     Today the following issues were addressed:    1) Hallucinations  2) Extrapyrimidal side effects  3) Depression    [place] Prescription Monitoring Program [] review was not needed today.    PSYCHOTROPIC DRUG INTERACTIONS: none clinically relevant     Diagnosis:  Schizoaffective Disorder    Plan                                                                                                                       m2, h3     - Stop Haldol due to EPS  - Start Invega (Paliperidone) 6mg by mouth once daily.  - Continue other medications.  Current Outpatient Prescriptions   Medication Sig     paliperidone (INVEGA) 6 MG 24 hr tablet Take 1 tablet (6 mg) by mouth every morning     acetaminophen (TYLENOL) 500 MG tablet Take 2 tablets (1,000 mg) by mouth 3 times daily as needed for mild pain     vitamin D3 (CHOLECALCIFEROL) 91768 UNITS capsule TAKE 1 CAPSULE (50,000 UNITS) BY MOUTH ONCE A WEEK     melatonin 5 MG CAPS Take 2 capsules by mouth daily At dinnertime     blood glucose monitoring (ONETOUCH VERIO IQ) test strip Use to test blood sugar 4 times daily .  Ok to substitute alternative if insurance prefers.     amantadine (SYMMETREL) 100 MG capsule Take 1 capsule (100 mg) by mouth 2 times daily     [DISCONTINUED] haloperidol (HALDOL) 5 MG tablet Take 1  tablet (5 mg) by mouth 2 times daily     NOVOFINE 30 30G X 8 MM insulin pen needle USE 4 DAILY OR AS DIRECTED     DiphenhydrAMINE HCl (DIPHENDRYL PO) Take 25 mg by mouth every 6 hours as needed for itching     clotrimazole (LOTRIMIN) 1 % cream Apply topically 2 times daily     HALOPERIDOL PO Take 5 mg by mouth every 12 hours as needed for agitation     insulin glargine (LANTUS) 100 UNIT/ML injection Inject 45 Units Subcutaneous At Bedtime (Patient taking differently: Inject 45 Units Subcutaneous At Bedtime Lantus dose is 45 units Q HS)     insulin aspart (NOVOLOG FLEXPEN) 100 UNIT/ML injection Inject 20 Units Subcutaneous 3 times daily (with meals) Once daily, can add additional 5 units if BGs are >500mg/dL.     prochlorperazine (COMPAZINE) 5 MG tablet Take 1 tablet (5 mg) by mouth every 6 hours as needed for nausea or vomiting     ranitidine (ZANTAC) 300 MG tablet TAKE 1 TABLET (300MG) BY MOUTH AT BEDTIME     order for DME Diabetic Shoes     losartan (COZAAR) 100 MG tablet TAKE 1 TABLET (100MG) BY MOUTH DAILY     gabapentin (NEURONTIN) 300 MG capsule TAKE 2 CAPSULES (600MG) BY MOUTH THREE TIMES A DAY     blood glucose monitoring (ONE TOUCH DELICA) lancets Use to test blood sugar 2 times daily or as directed.  Ok to substitute alternative if insurance prefers.     albuterol (PROAIR HFA/PROVENTIL HFA/VENTOLIN HFA) 108 (90 BASE) MCG/ACT Inhaler Inhale 2 puffs into the lungs every 6 hours as needed for shortness of breath / dyspnea or wheezing     senna-docusate (SENOKOT-S;PERICOLACE) 8.6-50 MG per tablet Take 1 tablet by mouth 2 times daily as needed for constipation     ACETAMINOPHEN PO Take 1,000 mg by mouth every 6 hours as needed for pain or fever     atorvastatin (LIPITOR) 80 MG tablet TAKE 1 TABLET (80MG) BY MOUTH DAILY     metoprolol (TOPROL-XL) 100 MG 24 hr tablet TAKE 1 TABLET (100MG) BY MOUTH DAILY     polyethylene glycol (MIRALAX/GLYCOLAX) powder TAKE 17 GRAMS (1 CAPFUL) BY MOUTH DAILY     TRULICITY 1.5  MG/0.5ML pen INJECT 1.5MG SUBCUTANEOUSLY EVERY SEVEN DAYS     aspirin (ASPIRIN LOW DOSE) 81 MG EC tablet Take 1 tablet (81 mg) by mouth daily     ibuprofen (ADVIL,MOTRIN) 600 MG tablet Take 1 tablet (600 mg) by mouth every 4 hours as needed for moderate pain     glucose 4 G CHEW Take 2 every 15 minutes for blood sugar <70mg/dL. Recheck blood sugar every 15 minutes until above 70mg/dL, then eat a substantial meal.     order for DME Hold Novolog if meals are refused.     No current facility-administered medications for this visit.      - I was informed of the med error at the [facility] where she received and extra 50mg of Sertraline every day for four days. Sertraline was stopped at the Foster Care facility.  Recheck in 4 weeks    CRISIS NUMBERS:   Provided routinely in AVS.    Treatment Risk Statement:  The patient understands the risks, benefits, adverse effects and alternatives. Agrees to treatment with the capacity to do so. No medical contraindications to treatment. Agrees to call clinic for any problems. The patient understands to call 911 or go to the nearest ED if life threatening or urgent symptoms occur.     Time spent face to face with patient was 30 min. Greater than 50% or 20 min were spent in counseling and coordination of care. Patient was counseled about symptoms, life stressors and treatment options.     PROVIDER:  Kraig Pedersen MD

## 2018-02-06 ENCOUNTER — OFFICE VISIT (OUTPATIENT)
Dept: BEHAVIORAL HEALTH | Facility: CLINIC | Age: 58
End: 2018-02-06
Payer: MEDICARE

## 2018-02-06 DIAGNOSIS — F43.10 POSTTRAUMATIC STRESS DISORDER: ICD-10-CM

## 2018-02-06 DIAGNOSIS — F25.1 SCHIZOAFFECTIVE DISORDER, DEPRESSIVE TYPE (H): Primary | ICD-10-CM

## 2018-02-06 PROCEDURE — 90834 PSYTX W PT 45 MINUTES: CPT | Performed by: SOCIAL WORKER

## 2018-02-06 NOTE — PROGRESS NOTES
[facility]   February 6, 2018      Behavioral Health Clinician Progress Note    Patient Name: Nena Tang           Service Type:  Individual      Service Location:   Face to Face in Clinic     Session Start Time: 2:48pm  Session End Time: 3:26pm      Session Length: 38 - 52      Attendees: Patient    Visit Activities (Refresh list every visit): [facility] Only    Diagnostic Assessment Date: 1-23-18  Treatment Plan Review Date: Not completed yet  See Flowsheets for today's PHQ-9 and TAMIA-7 results  Previous PHQ-9:   PHQ-9 SCORE 12/20/2016 1/2/2017 2/3/2017   Total Score - - -   Total Score - - -   Total Score 7 0 0     Previous TAMIA-7:   TAMIA-7 SCORE 12/20/2016 1/2/2017 2/3/2017   Total Score - - -   Total Score 6 0 0   Total Score - - -       ALEXANDRA LEVEL:  ALEXANDRA Score (Last Two) 8/30/2011 6/9/2014   ALEXANDRA Raw Score 42 37   Activation Score 66 49.9   ALEXANDRA Level 3 2       DATA  Extended Session (60+ minutes): No  Interactive Complexity: No  Crisis: No  Located within [facility] Patient: No    Treatment Objective(s) Addressed in This Session:  Target Behavior(s): disease management/lifestyle changes mental health    Depressed Mood: Increase interest, engagement, and pleasure in doing things  Decrease frequency and intensity of feeling down, depressed, hopeless  Improve quantity and quality of night time sleep / decrease daytime naps  Feel less tired and more energy during the day   Identify negative self-talk and behaviors: challenge core beliefs, myths, and actions  Improve concentration, focus, and mindfulness in daily activities   Decrease thoughts that you'd be better off dead or of suicide / self-harm  Anxiety: will experience a reduction in anxiety, will develop more effective coping skills to manage anxiety symptoms, will develop healthy cognitive patterns and beliefs and will increase ability to function adaptively  Alcohol / Substance Use: continue to make healthy choices regarding substance use and engage in  "activities / supportive services that promote sobriety  Thought Disturbance: will develop skills to more effectively manage symptoms, will develop more effective support systems and will continue to take medications as prescribed    Current Stressors / Issues:  The patient reported that \"the man\" is in her room-she sees him when the lights are off-he scares the patient as she hides from him by putting head under the cover-she has hard time avoiding ignoring him-she believes that he is getting closer-he tells her to hurt herself-she is worried about following the man if her gets too close-when he away from her at a distance she is okay and can think for the self-to avoid(don't want them to get too close the hallucination: watches TV-walk in the wall way-I'm strong right now-I'm getting good sleep this makes me stronger-wearing the CPAP machine every night-the man could get stronger and tell me not use the CPAP-he is the strongest at night-we talked about her sleeping pattern and how it may be making her more vulnerable-she talked about what would help her to adjust her sleeping-we agreed on plan to use some of the time doing something that would bring up positive feelings and calmness-regarding mood she reported mood-the birthday of her late  is coming up-she became tearful-regarding anxiety she has been worrying a lot but not panic attacks.         Progress on Treatment Objective(s) / Homework:  Minimal progress - PREPARATION (Decided to change - considering how); Intervened by negotiating a change plan and determining options / strategies for behavior change, identifying triggers, exploring social supports, and working towards setting a date to begin behavior change    Motivational Interviewing    MI Intervention: Expressed Empathy/Understanding, Supported Autonomy, Collaboration, Evocation, Permission to raise concern or advise, Open-ended questions, Reflections: simple and complex, Rolled with resistance: " Emphasized patient autonomy, Simple reflection and Evoked patient agenda and Change talk (evoked)     Change Talk Expressed by the Patient: Desire to change Ability to change Reasons to change Need to change Activation Taking steps    Provider Response to Change Talk: E - Evoked more info from patient about behavior change, A - Affirmed patient's thoughts, decisions, or attempts at behavior change, R - Reflected patient's change talk and S - Summarized patient's change talk statements      Care Plan review completed: No    Medication Review:  No changes to current psychiatric medication(s)    Medication Compliance:  NA    Changes in Health Issues:   None reported    Chemical Use Review:   Substance Use: Chemical use reviewed, no active concerns identified      Tobacco Use: No current tobacco use.      Assessment: Current Emotional / Mental Status (status of significant symptoms):  Risk status (Self / Other harm or suicidal ideation)  Patient has had a history of suicidal ideation: yes  Patient denies current fears or concerns for personal safety.  Patient denies current or recent suicidal ideation or behaviors.  Patient denies current or recent homicidal ideation or behaviors.  Patient denies current or recent self injurious behavior or ideation.  Patient denies other safety concerns.  A safety and risk management plan has not been developed at this time, however patient was encouraged to call [place] [phone] should there be a change in any of these risk factors.    Appearance:   Appropriate   Eye Contact:   Good   Psychomotor Behavior: Normal   Attitude:   Cooperative   Orientation:   All  Speech   Rate / Production: Normal    Volume:  Normal   Mood:    Normal Sad   Affect:    Appropriate   Thought Content:  Clear   Thought Form:  Coherent  Goal Directed  Logical   Insight:    Fair     Diagnoses:  1. Schizoaffective disorder, depressive type (H)    2. Posttraumatic stress disorder        Collateral Reports  Completed:  Not Applicable    Plan: (Homework, other):  Patient was given information about behavioral services and encouraged to schedule a follow up appointment with the clinic [facility] in 2 weeks.  She was also given information about mental health symptoms and treatment options .  CD Recommendations: Maintain Sobriety.  BIN George, [facility]      ______________________________________________________________________

## 2018-02-06 NOTE — TELEPHONE ENCOUNTER
Central Prior Authorization Team   Phone: [phone]      PA Initiation    Medication: Invega 6 mg   Insurance Company: [employer] - Phone [phone] Fax [phone]  Pharmacy Filling the Rx: [place], [place] - [address] [place] [address]  Filling Pharmacy Phone: [phone]  Filling Pharmacy Fax: [phone]  Start Date: 2/6/2018

## 2018-02-06 NOTE — MR AVS SNAPSHOT
After Visit Summary   2/6/2018    Nena Tang    MRN: [id number]           Patient Information     Date Of Birth          1960        Visit Information        Provider Department      2/6/2018 2:30 PM Richardson Lopez, [facility]        Today's Diagnoses     Schizoaffective disorder, depressive type (H)    -  1    Posttraumatic stress disorder           Follow-ups after your visit        Your next 10 appointments already scheduled     Feb 20, 2018  3:00 PM CST   SHORT with Eugenia De Jesus [facility] ([facility]    [address] [facility] [address]  [address]  [facility] [address]   [phone]            Feb 20, 2018  3:30 PM CST   Return Visit with ART Arias CNP   [facility] ([facility])    [address]  [address]  [place] [facility] [address]   [phone]            Feb 20, 2018  3:30 PM CST   Return Visit with Richardson Lopez [facility] ([facility] [place] [facility]    [address]  [address]  [facility] [address]   [phone]            Mar 02, 2018 10:30 AM CST   Return Visit with Richardson Lopez [facility] ([facility] [place] [facility])    [address]  [address]  [facility] [address]   [phone]            Mar 09, 2018 11:30 AM CST   Return Visit with Kraig Pedersen MD   [facility] ([facility])    [address]  [facility] [address] [phone]              Who to contact     If you have questions or need follow up information about today's clinic visit or your schedule please contact [facility] directly at [phone].  Normal or non-critical lab and imaging results will be  communicated to you by panOpenhart, letter or phone within 4 business days after the clinic has received the results. If you do not hear from us within 7 days, please contact the clinic through Mixers or phone. If you have a critical or abnormal lab result, we will notify you by phone as soon as possible.  Submit refill requests through Mixers or call your pharmacy and they will forward the refill request to us. Please allow 3 business days for your refill to be completed.          Additional Information About Your Visit        Mixers Information     Mixers gives you secure access to your electronic health record. If you see a primary care provider, you can also send messages to your care team and make appointments. If you have questions, please call your primary care clinic.  If you do not have a primary care provider, please call [phone] and they will assist you.        Care EveryWhere ID     This is your Care EveryWhere ID. This could be used by other organizations to access your [facility] medical records  [id number]        Your Vitals Were     Last Period                   01/06/2015            Blood Pressure from Last 3 Encounters:   01/23/18 130/80   01/17/18 113/49   01/14/18 109/61    Weight from Last 3 Encounters:   01/23/18 226 lb 8 oz (102.7 kg)   01/18/18 219 lb 4.8 oz (99.5 kg)   12/29/17 221 lb 8 oz (100.5 kg)              Today, you had the following     No orders found for display         Today's Medication Changes          These changes are accurate as of 2/6/18  3:48 PM.  If you have any questions, ask your nurse or doctor.               These medicines have changed or have updated prescriptions.        Dose/Directions    insulin glargine 100 UNIT/ML injection   Commonly known as:  LANTUS   This may have changed:  additional instructions   Used for:  Type 2 diabetes mellitus with mild nonproliferative retinopathy without macular edema, with long-term current use of insulin, unspecified  laterality (H)        Dose:  45 Units   Inject 45 Units Subcutaneous At Bedtime   Quantity:  3 mL   Refills:  11                Primary Care Provider Office Phone # Fax #    ART Arias [phone]       [address] [facility] [address]  [facility] [address]        Goals        General    Medical (pt-stated)     Notes - Note created  7/7/2016  9:15 AM by Chelsea Pulido RN    Get blood sugars under control    Improve medication compliance    As of today's date 7/7/2016 goal is met at 0 - 25%.   Goal Status:  Active          Equal Access to Services     [facility]: Hadii aad ku hadasho Soomaali, waaxda luqadaha, qaybta kaalmada adeegyada, lorena ellison . So [facility] [phone].    ATENCIÓN: Si habla español, tiene a trinh disposición servicios gratuitos de asistencia lingüística. Llame al [phone].    We comply with applicable federal civil rights laws and [place] laws. We do not discriminate on the basis of race, color, national origin, age, disability, sex, sexual orientation, or gender identity.            Thank you!     Thank you for choosing [facility]  for your care. Our goal is always to provide you with excellent care. Hearing back from our patients is one way we can continue to improve our services. Please take a few minutes to complete the written survey that you may receive in the mail after your visit with us. Thank you!             Your Updated Medication List - Protect others around you: Learn how to safely use, store and throw away your medicines at www.disposemymeds.org.          This list is accurate as of 2/6/18  3:48 PM.  Always use your most recent med list.                   Brand Name Dispense Instructions for use Diagnosis    * ACETAMINOPHEN PO      Take 1,000 mg by mouth every 6 hours as needed for pain or fever        * acetaminophen 500 MG tablet    TYLENOL    100 tablet    Take 2 tablets (1,000 mg) by mouth  3 times daily as needed for mild pain    Chronic low back pain, unspecified back pain laterality, with sciatica presence unspecified       albuterol 108 (90 BASE) MCG/ACT Inhaler    PROAIR HFA/PROVENTIL HFA/VENTOLIN HFA    3 Inhaler    Inhale 2 puffs into the lungs every 6 hours as needed for shortness of breath / dyspnea or wheezing    Dyspnea on exertion       amantadine 100 MG capsule    SYMMETREL    60 capsule    Take 1 capsule (100 mg) by mouth 2 times daily    Schizoaffective disorder, depressive type (H), Tardive dyskinesia       aspirin 81 MG EC tablet    ASPIRIN LOW DOSE    100 tablet    Take 1 tablet (81 mg) by mouth daily    Coronary artery disease involving native heart with angina pectoris, unspecified vessel or lesion type (H)       atorvastatin 80 MG tablet    LIPITOR    28 tablet    TAKE 1 TABLET (80MG) BY MOUTH DAILY    Hyperlipidemia LDL goal <100       blood glucose monitoring lancets     150 each    Use to test blood sugar 2 times daily or as directed.  Ok to substitute alternative if insurance prefers.    Type 2 diabetes mellitus with diabetic neuropathy, with long-term current use of insulin (H)       blood glucose monitoring test strip    ONETOUCH VERIO IQ    200 strip    Use to test blood sugar 4 times daily .  Ok to substitute alternative if insurance prefers.    Type 2 diabetes mellitus with diabetic neuropathy, with long-term current use of insulin (H)       clotrimazole 1 % cream    LOTRIMIN     Apply topically 2 times daily        DIPHENDRYL PO      Take 25 mg by mouth every 6 hours as needed for itching        gabapentin 300 MG capsule    NEURONTIN    168 capsule    TAKE 2 CAPSULES (600MG) BY MOUTH THREE TIMES A DAY    Type 2 diabetes mellitus with diabetic neuropathy, with long-term current use of insulin (H)       glucose 4 G Chew chewable tablet     20 tablet    Take 2 every 15 minutes for blood sugar <70mg/dL. Recheck blood sugar every 15 minutes until above 70mg/dL, then eat a  substantial meal.    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    Type 2 diabetes mellitus with mild nonproliferative retinopathy without macular edema, with long-term current use of insulin, unspecified laterality (H)       insulin glargine 100 UNIT/ML injection    LANTUS    3 mL    Inject 45 Units Subcutaneous At Bedtime    Type 2 diabetes mellitus with mild nonproliferative retinopathy without macular edema, with long-term current use of insulin, unspecified laterality (H)       losartan 100 MG tablet    COZAAR    28 tablet    TAKE 1 TABLET (100MG) BY MOUTH DAILY    Coronary artery disease involving native heart with angina pectoris, unspecified vessel or lesion type (H)       melatonin 5 MG Caps     180 capsule    Take 2 capsules by mouth daily At dinnertime    Insomnia, unspecified type       metoprolol succinate 100 MG 24 hr tablet    TOPROL-XL    28 tablet    TAKE 1 TABLET (100MG) BY MOUTH DAILY    Coronary artery disease involving native heart with angina pectoris, unspecified vessel or lesion type (H)       NOVOFINE 30 30G X 8 MM   Generic drug:  insulin pen needle     100 each    USE 4 DAILY OR AS DIRECTED    Type 2 diabetes mellitus with mild nonproliferative retinopathy without macular edema, with long-term current use of insulin, unspecified laterality (H)       * order for DME     1 each    Hold Novolog if meals are refused.    Type 2 diabetes mellitus with mild nonproliferative retinopathy without macular  edema, with long-term current use of insulin, unspecified laterality (H)       * order for DME     2 each    Diabetic Shoes    Type 2 diabetes mellitus with mild nonproliferative retinopathy without macular edema, with long-term current use of insulin, unspecified laterality (H)       paliperidone 6 MG 24 hr tablet    INVEGA    30 tablet    Take 1 tablet (6 mg) by mouth every morning    Schizoaffective disorder, bipolar type (H)       polyethylene glycol powder    MIRALAX/GLYCOLAX    527 g    TAKE 17 GRAMS (1 CAPFUL) BY MOUTH DAILY    Constipation, unspecified constipation type       prochlorperazine 5 MG tablet    COMPAZINE    90 tablet    Take 1 tablet (5 mg) by mouth every 6 hours as needed for nausea or vomiting    Nausea       ranitidine 300 MG tablet    ZANTAC    90 tablet    TAKE 1 TABLET (300MG) BY MOUTH AT BEDTIME    Gastroesophageal reflux disease without esophagitis       senna-docusate 8.6-50 MG per tablet    SENOKOT-S;PERICOLACE     Take 1 tablet by mouth 2 times daily as needed for constipation        TRULICITY 1.5 MG/0.5ML pen   Generic drug:  dulaglutide     2 mL    INJECT 1.5MG SUBCUTANEOUSLY EVERY SEVEN DAYS    Type 2 diabetes mellitus with mild nonproliferative retinopathy without macular edema, with long-term current use of insulin, unspecified laterality (H)       vitamin D3 65164 UNITS capsule    CHOLECALCIFEROL    4 capsule    TAKE 1 CAPSULE (50,000 UNITS) BY MOUTH ONCE A WEEK    Vitamin D deficiency       * Notice:  This list has 4 medication(s) that are the same as other medications prescribed for you. Read the directions carefully, and ask your doctor or other care provider to review them with you.

## 2018-02-07 ENCOUNTER — MEDICAL CORRESPONDENCE (OUTPATIENT)
Dept: HEALTH INFORMATION MANAGEMENT | Facility: CLINIC | Age: 58
End: 2018-02-07

## 2018-02-07 NOTE — TELEPHONE ENCOUNTER
Prior Authorization Approval    Authorization Effective Date: 2/6/2018  Authorization Expiration Date: 2/6/2020  Medication: Invega 6 mg - APPROVED  Approved Dose/Quantity: 30/30 days  Reference #: [id number]   Insurance Company: [employer] - Phone [phone] Fax [phone]  Expected CoPay: $0     CoPay Card Available:      Foundation Assistance Needed:    Which Pharmacy is filling the prescription (Not needed for infusion/clinic administered): [place], [place] - [address] [place] [address]  Pharmacy Notified: YesComment:  they have it shceduled to be delivered to the patient this afternoon  Patient Notified: Yes

## 2018-02-08 ENCOUNTER — HOSPITAL ENCOUNTER (EMERGENCY)
Facility: CLINIC | Age: 58
Discharge: HOME | End: 2018-02-09
Attending: EMERGENCY MEDICINE | Admitting: EMERGENCY MEDICINE
Payer: MEDICARE

## 2018-02-08 DIAGNOSIS — R11.2 NON-INTRACTABLE VOMITING WITH NAUSEA, UNSPECIFIED VOMITING TYPE: ICD-10-CM

## 2018-02-08 LAB
ALBUMIN SERPL-MCNC: 3.7 G/DL (ref 3.4–5)
ALP SERPL-CCNC: 99 U/L (ref 40–150)
ALT SERPL W P-5'-P-CCNC: 23 U/L (ref 0–50)
ANION GAP SERPL CALCULATED.3IONS-SCNC: 4 MMOL/L (ref 3–14)
AST SERPL W P-5'-P-CCNC: 12 U/L (ref 0–45)
BASOPHILS # BLD AUTO: 0 10E9/L (ref 0–0.2)
BASOPHILS NFR BLD AUTO: 0.5 %
BILIRUB DIRECT SERPL-MCNC: <0.1 MG/DL (ref 0–0.2)
BILIRUB SERPL-MCNC: 0.2 MG/DL (ref 0.2–1.3)
BUN SERPL-MCNC: 19 MG/DL (ref 7–30)
CALCIUM SERPL-MCNC: 9.8 MG/DL (ref 8.5–10.1)
CHLORIDE SERPL-SCNC: 103 MMOL/L (ref 94–109)
CO2 SERPL-SCNC: 31 MMOL/L (ref 20–32)
CREAT SERPL-MCNC: 1.09 MG/DL (ref 0.52–1.04)
DIFFERENTIAL METHOD BLD: ABNORMAL
EOSINOPHIL # BLD AUTO: 0.1 10E9/L (ref 0–0.7)
EOSINOPHIL NFR BLD AUTO: 0.8 %
ERYTHROCYTE [DISTWIDTH] IN BLOOD BY AUTOMATED COUNT: 13.3 % (ref 10–15)
GFR SERPL CREATININE-BSD FRML MDRD: 52 ML/MIN/1.7M2
GLUCOSE BLDC GLUCOMTR-MCNC: 76 MG/DL (ref 70–99)
GLUCOSE SERPL-MCNC: 84 MG/DL (ref 70–99)
HCT VFR BLD AUTO: 31.1 % (ref 35–47)
HGB BLD-MCNC: 10.2 G/DL (ref 11.7–15.7)
IMM GRANULOCYTES # BLD: 0 10E9/L (ref 0–0.4)
IMM GRANULOCYTES NFR BLD: 0.3 %
LIPASE SERPL-CCNC: 329 U/L (ref 73–393)
LYMPHOCYTES # BLD AUTO: 1.9 10E9/L (ref 0.8–5.3)
LYMPHOCYTES NFR BLD AUTO: 29.4 %
MCH RBC QN AUTO: 32 PG (ref 26.5–33)
MCHC RBC AUTO-ENTMCNC: 32.8 G/DL (ref 31.5–36.5)
MCV RBC AUTO: 98 FL (ref 78–100)
MONOCYTES # BLD AUTO: 0.5 10E9/L (ref 0–1.3)
MONOCYTES NFR BLD AUTO: 8.4 %
NEUTROPHILS # BLD AUTO: 3.9 10E9/L (ref 1.6–8.3)
NEUTROPHILS NFR BLD AUTO: 60.6 %
NRBC # BLD AUTO: 0 10*3/UL
NRBC BLD AUTO-RTO: 0 /100
PLATELET # BLD AUTO: 210 10E9/L (ref 150–450)
POTASSIUM SERPL-SCNC: 4.3 MMOL/L (ref 3.4–5.3)
PROT SERPL-MCNC: 8 G/DL (ref 6.8–8.8)
RBC # BLD AUTO: 3.19 10E12/L (ref 3.8–5.2)
SODIUM SERPL-SCNC: 138 MMOL/L (ref 133–144)
WBC # BLD AUTO: 6.4 10E9/L (ref 4–11)

## 2018-02-08 PROCEDURE — 80048 BASIC METABOLIC PNL TOTAL CA: CPT | Performed by: EMERGENCY MEDICINE

## 2018-02-08 PROCEDURE — 00000146 ZZHCL STATISTIC GLUCOSE BY METER IP

## 2018-02-08 PROCEDURE — 96361 HYDRATE IV INFUSION ADD-ON: CPT

## 2018-02-08 PROCEDURE — 85025 COMPLETE CBC W/AUTO DIFF WBC: CPT | Performed by: EMERGENCY MEDICINE

## 2018-02-08 PROCEDURE — 83690 ASSAY OF LIPASE: CPT | Performed by: EMERGENCY MEDICINE

## 2018-02-08 PROCEDURE — 25000128 H RX IP 250 OP 636: Performed by: EMERGENCY MEDICINE

## 2018-02-08 PROCEDURE — 80076 HEPATIC FUNCTION PANEL: CPT | Performed by: EMERGENCY MEDICINE

## 2018-02-08 PROCEDURE — 99284 EMERGENCY DEPT VISIT MOD MDM: CPT | Mod: 25

## 2018-02-08 PROCEDURE — 96376 TX/PRO/DX INJ SAME DRUG ADON: CPT

## 2018-02-08 PROCEDURE — 96374 THER/PROPH/DIAG INJ IV PUSH: CPT

## 2018-02-08 RX ADMIN — SODIUM CHLORIDE 1000 ML: 9 INJECTION, SOLUTION INTRAVENOUS at 22:02

## 2018-02-08 RX ADMIN — PROCHLORPERAZINE EDISYLATE 5 MG: 5 INJECTION INTRAMUSCULAR; INTRAVENOUS at 22:03

## 2018-02-08 RX ADMIN — PROCHLORPERAZINE EDISYLATE 5 MG: 5 INJECTION INTRAMUSCULAR; INTRAVENOUS at 22:49

## 2018-02-08 ASSESSMENT — ENCOUNTER SYMPTOMS
ABDOMINAL PAIN: 1
NAUSEA: 1
FEVER: 0
DIARRHEA: 0
COUGH: 1
VOMITING: 1

## 2018-02-08 NOTE — ED AVS SNAPSHOT
[facility]    [address]    [facility] [address]    Phone:  [phone]    Fax:  [phone]                                       Nena Tang   MRN: [id number]    Department:  [facility]   Date of Visit:  2/8/2018           After Visit Summary Signature Page     I have received my discharge instructions, and my questions have been answered. I have discussed any challenges I see with this plan with the nurse or doctor.    ..........................................................................................................................................  Patient/Patient Representative Signature      ..........................................................................................................................................  Patient Representative Print Name and Relationship to Patient    ..................................................               ................................................  Date                                            Time    ..........................................................................................................................................  Reviewed by Signature/Title    ...................................................              ..............................................  Date                                                            Time

## 2018-02-08 NOTE — ED AVS SNAPSHOT
[facility]    [address]    [facility] [address]    Phone:  [phone]    Fax:  [phone]                                       Nena Tang   MRN: [id number]    Department:  [facility]   Date of Visit:  2/8/2018           Patient Information     Date Of Birth          1960        Your diagnoses for this visit were:     Non-intractable vomiting with nausea, unspecified vomiting type        You were seen by Olga Coyne MD.      Follow-up Information     Follow up with Rowena Haas APRN CNP In 3 days.    Specialty:  Nurse Practitioner - Gerontology    Contact information:    [address] [facility] [place] [address]  [facility] [address] [phone]          Follow up with [facility].    Specialty:  EMERGENCY MEDICINE    Why:  If symptoms worsen    Contact information:    [address] [facility] [address] [phone]        Discharge Instructions       Use compazine as needed for nausea and vomiting as we discussed.  Give only 1/2 of Lantus tonight (20 units).  Follow up with primary care.  If vomiting happens again after new medication, patient may need to stop this and try a different medication.  Return with new or concerning symptoms.    Discharge References/Attachments     VOMITING (7Y-ADULT) (ENGLISH)      Future Appointments        Provider Department Dept Phone Center    2/20/2018 3:00 PM Eugenia De Jesus, [facility] Primary Care [facility] [phone] [facility]    2/20/2018 3:30 PM Richardson Lopez [facility] Primary Care [phone] [facility]    2/20/2018 3:30 PM ART Fay CNP [facility] [phone] [facility]    3/2/2018 10:30 AM Richardson Lopez, [facility] [phone] [facility]    3/9/2018 11:30 AM Kraig Pedersen MD [facility]  Primary Care [phone] [facility]      24 Hour Appointment Hotline       To make an appointment at any [facility] [place], call [phone] ([phone]). If you don't have a family doctor or clinic, we will help you find one. [facility] are conveniently located to serve the needs of you and your family.             Review of your medicines      Our records show that you are taking the medicines listed below. If these are incorrect, please call your family doctor or clinic.        Dose / Directions Last dose taken    * ACETAMINOPHEN PO   Dose:  1000 mg        Take 1,000 mg by mouth every 6 hours as needed for pain or fever   Refills:  0        * acetaminophen 500 MG tablet   Commonly known as:  TYLENOL   Dose:  1000 mg   Quantity:  100 tablet        Take 2 tablets (1,000 mg) by mouth 3 times daily as needed for mild pain   Refills:  0        albuterol 108 (90 BASE) MCG/ACT Inhaler   Commonly known as:  PROAIR HFA/PROVENTIL HFA/VENTOLIN HFA   Dose:  2 puff   Quantity:  3 Inhaler        Inhale 2 puffs into the lungs every 6 hours as needed for shortness of breath / dyspnea or wheezing   Refills:  1        amantadine 100 MG capsule   Commonly known as:  SYMMETREL   Dose:  100 mg   Quantity:  60 capsule        Take 1 capsule (100 mg) by mouth 2 times daily   Refills:  0        aspirin 81 MG EC tablet   Commonly known as:  ASPIRIN LOW DOSE   Dose:  81 mg   Quantity:  100 tablet        Take 1 tablet (81 mg) by mouth daily   Refills:  4        atorvastatin 80 MG tablet   Commonly known as:  LIPITOR   Quantity:  28 tablet        TAKE 1 TABLET (80MG) BY MOUTH DAILY   Refills:  11        blood glucose monitoring lancets   Quantity:  150 each        Use to test blood sugar 2 times daily or as directed.  Ok to substitute alternative if insurance prefers.   Refills:  11        blood glucose monitoring test strip   Commonly known as:  ONETOUCH VERIO IQ   Quantity:  200 strip        Use to test blood sugar 4 times daily .   Ok to substitute alternative if insurance prefers.   Refills:  11        clotrimazole 1 % cream   Commonly known as:  LOTRIMIN        Apply topically 2 times daily   Refills:  0        DIPHENDRYL PO   Dose:  25 mg        Take 25 mg by mouth every 6 hours as needed for itching   Refills:  0        gabapentin 300 MG capsule   Commonly known as:  NEURONTIN   Quantity:  168 capsule        TAKE 2 CAPSULES (600MG) BY MOUTH THREE TIMES A DAY   Refills:  3        glucose 4 G Chew chewable tablet   Quantity:  20 tablet        Take 2 every 15 minutes for blood sugar <70mg/dL. Recheck blood sugar every 15 minutes until above 70mg/dL, then eat a substantial meal.   Refills:  1        ibuprofen 600 MG tablet   Commonly known as:  ADVIL/MOTRIN   Dose:  600 mg   Quantity:  60 tablet        Take 1 tablet (600 mg) by mouth every 4 hours as needed for moderate pain   Refills:  0        insulin aspart 100 UNIT/ML injection   Commonly known as:  NovoLOG FLEXPEN   Dose:  20 Units   Quantity:  15 mL        Inject 20 Units Subcutaneous 3 times daily (with meals) Once daily, can add additional 5 units if BGs are >500mg/dL.   Refills:  11        insulin glargine 100 UNIT/ML injection   Commonly known as:  LANTUS   Dose:  45 Units   Quantity:  3 mL        Inject 45 Units Subcutaneous At Bedtime   Refills:  11        losartan 100 MG tablet   Commonly known as:  COZAAR   Quantity:  28 tablet        TAKE 1 TABLET (100MG) BY MOUTH DAILY   Refills:  3        melatonin 5 MG Caps   Dose:  2 capsule   Quantity:  180 capsule        Take 2 capsules by mouth daily At dinnertime   Refills:  3        metoprolol succinate 100 MG 24 hr tablet   Commonly known as:  TOPROL-XL   Quantity:  28 tablet        TAKE 1 TABLET (100MG) BY MOUTH DAILY   Refills:  11        NOVOFINE 30 30G X 8 MM   Quantity:  100 each   Generic drug:  insulin pen needle        USE 4 DAILY OR AS DIRECTED   Refills:  1        * order for DME   Quantity:  1 each        Hold Novolog if  meals are refused.   Refills:  0        * order for DME   Quantity:  2 each        Diabetic Shoes   Refills:  0        paliperidone 6 MG 24 hr tablet   Commonly known as:  INVEGA   Dose:  6 mg   Quantity:  30 tablet        Take 1 tablet (6 mg) by mouth every morning   Refills:  0        polyethylene glycol powder   Commonly known as:  MIRALAX/GLYCOLAX   Quantity:  527 g        TAKE 17 GRAMS (1 CAPFUL) BY MOUTH DAILY   Refills:  3        prochlorperazine 5 MG tablet   Commonly known as:  COMPAZINE   Dose:  5 mg   Quantity:  90 tablet        Take 1 tablet (5 mg) by mouth every 6 hours as needed for nausea or vomiting   Refills:  0        ranitidine 300 MG tablet   Commonly known as:  ZANTAC   Quantity:  90 tablet        TAKE 1 TABLET (300MG) BY MOUTH AT BEDTIME   Refills:  1        senna-docusate 8.6-50 MG per tablet   Commonly known as:  SENOKOT-S;PERICOLACE   Dose:  1 tablet        Take 1 tablet by mouth 2 times daily as needed for constipation   Refills:  0        TRULICITY 1.5 MG/0.5ML pen   Quantity:  2 mL   Generic drug:  dulaglutide        INJECT 1.5MG SUBCUTANEOUSLY EVERY SEVEN DAYS   Refills:  11        vitamin D3 16878 UNITS capsule   Commonly known as:  CHOLECALCIFEROL   Quantity:  4 capsule        TAKE 1 CAPSULE (50,000 UNITS) BY MOUTH ONCE A WEEK   Refills:  3        * Notice:  This list has 4 medication(s) that are the same as other medications prescribed for you. Read the directions carefully, and ask your doctor or other care provider to review them with you.            Procedures and tests performed during your visit     Basic metabolic panel    CBC with platelets differential    Glucose by meter    Hepatic panel    Lipase    Peripheral IV catheter      Orders Needing Specimen Collection     None      Pending Results     No orders found for last 3 day(s).            Pending Culture Results     No orders found for last 3 day(s).            Pending Results Instructions     If you had any lab results  that were not finalized at the time of your Discharge, you can call the ED Lab Result RN at [phone]. You will be contacted by this team for any positive Lab results or changes in treatment. The nurses are available 7 days a week from 10A to 6:30P.  You can leave a message 24 hours per day and they will return your call.        Test Results From Your Hospital Stay        2/8/2018  9:16 PM      Component Results     Component Value Ref Range & Units Status    Glucose 76 70 - 99 mg/dL Final         2/8/2018 10:23 PM      Component Results     Component Value Ref Range & Units Status    Sodium 138 133 - 144 mmol/L Final    Potassium 4.3 3.4 - 5.3 mmol/L Final    Chloride 103 94 - 109 mmol/L Final    Carbon Dioxide 31 20 - 32 mmol/L Final    Anion Gap 4 3 - 14 mmol/L Final    Glucose 84 70 - 99 mg/dL Final    Urea Nitrogen 19 7 - 30 mg/dL Final    Creatinine 1.09 (H) 0.52 - 1.04 mg/dL Final    GFR Estimate 52 (L) >60 mL/min/1.7m2 Final    Non  GFR Calc    GFR Estimate If Black 63 >60 mL/min/1.7m2 Final    African American GFR Calc    Calcium 9.8 8.5 - 10.1 mg/dL Final         2/8/2018 10:23 PM      Component Results     Component Value Ref Range & Units Status    Lipase 329 73 - 393 U/L Final         2/8/2018 10:23 PM      Component Results     Component Value Ref Range & Units Status    Bilirubin Direct <0.1 0.0 - 0.2 mg/dL Final    Bilirubin Total 0.2 0.2 - 1.3 mg/dL Final    Albumin 3.7 3.4 - 5.0 g/dL Final    Protein Total 8.0 6.8 - 8.8 g/dL Final    Alkaline Phosphatase 99 40 - 150 U/L Final    ALT 23 0 - 50 U/L Final    AST 12 0 - 45 U/L Final         2/8/2018 10:07 PM      Component Results     Component Value Ref Range & Units Status    WBC 6.4 4.0 - 11.0 10e9/L Final    RBC Count 3.19 (L) 3.8 - 5.2 10e12/L Final    Hemoglobin 10.2 (L) 11.7 - 15.7 g/dL Final    Hematocrit 31.1 (L) 35.0 - 47.0 % Final    MCV 98 78 - 100 fl Final    MCH 32.0 26.5 - 33.0 pg Final    MCHC 32.8 31.5 - 36.5 g/dL  Final    RDW 13.3 10.0 - 15.0 % Final    Platelet Count 210 150 - 450 10e9/L Final    Diff Method Automated Method  Final    % Neutrophils 60.6 % Final    % Lymphocytes 29.4 % Final    % Monocytes 8.4 % Final    % Eosinophils 0.8 % Final    % Basophils 0.5 % Final    % Immature Granulocytes 0.3 % Final    Nucleated RBCs 0 0 /100 Final    Absolute Neutrophil 3.9 1.6 - 8.3 10e9/L Final    Absolute Lymphocytes 1.9 0.8 - 5.3 10e9/L Final    Absolute Monocytes 0.5 0.0 - 1.3 10e9/L Final    Absolute Eosinophils 0.1 0.0 - 0.7 10e9/L Final    Absolute Basophils 0.0 0.0 - 0.2 10e9/L Final    Abs Immature Granulocytes 0.0 0 - 0.4 10e9/L Final    Absolute Nucleated RBC 0.0  Final                Clinical Quality Measure: Blood Pressure Screening     Your blood pressure was checked while you were in the emergency department today. The last reading we obtained was  BP: 143/70 . Please read the guidelines below about what these numbers mean and what you should do about them.  If your systolic blood pressure (the top number) is less than 120 and your diastolic blood pressure (the bottom number) is less than 80, then your blood pressure is normal. There is nothing more that you need to do about it.  If your systolic blood pressure (the top number) is 120-139 or your diastolic blood pressure (the bottom number) is 80-89, your blood pressure may be higher than it should be. You should have your blood pressure rechecked within a year by a primary care provider.  If your systolic blood pressure (the top number) is 140 or greater or your diastolic blood pressure (the bottom number) is 90 or greater, you may have high blood pressure. High blood pressure is treatable, but if left untreated over time it can put you at risk for heart attack, stroke, or kidney failure. You should have your blood pressure rechecked by a primary care provider within the next 4 weeks.  If your provider in the emergency department today gave you specific  instructions to follow-up with your doctor or provider even sooner than that, you should follow that instruction and not wait for up to 4 weeks for your follow-up visit.        Thank you for choosing [facility]       Thank you for choosing [facility] for your care. Our goal is always to provide you with excellent care. Hearing back from our patients is one way we can continue to improve our services. Please take a few minutes to complete the written survey that you may receive in the mail after you visit with us. Thank you!        [employer] Information     [employer] gives you secure access to your electronic health record. If you see a primary care provider, you can also send messages to your care team and make appointments. If you have questions, please call your primary care clinic.  If you do not have a primary care provider, please call [phone] and they will assist you.        Care EveryWhere ID     This is your Care EveryWhere ID. This could be used by other organizations to access your [facility] medical records  [id number]        Equal Access to Services     RAMESH GARRIDO : Hadii samira Rios, luis napier, devante catalan, lorena martins. So [facility] [phone].    ATENCIÓN: Si habla español, tiene a trinh disposición servicios gratuitos de asistencia lingüística. Llame al [phone].    We comply with applicable federal civil rights laws and [place] laws. We do not discriminate on the basis of race, color, national origin, age, disability, sex, sexual orientation, or gender identity.            After Visit Summary       This is your record. Keep this with you and show to your community pharmacist(s) and doctor(s) at your next visit.

## 2018-02-09 ENCOUNTER — TELEPHONE (OUTPATIENT)
Dept: FAMILY MEDICINE | Facility: CLINIC | Age: 58
End: 2018-02-09

## 2018-02-09 VITALS
RESPIRATION RATE: 18 BRPM | TEMPERATURE: 96.9 F | HEART RATE: 82 BPM | SYSTOLIC BLOOD PRESSURE: 161 MMHG | DIASTOLIC BLOOD PRESSURE: 84 MMHG | OXYGEN SATURATION: 100 %

## 2018-02-09 NOTE — DISCHARGE INSTRUCTIONS
Use compazine as needed for nausea and vomiting as we discussed.  Give only 1/2 of Lantus tonight (20 units).  Follow up with primary care.  If vomiting happens again after new medication, patient may need to stop this and try a different medication.  Return with new or concerning symptoms.

## 2018-02-09 NOTE — ED NOTES
Nausea, vomiting, and abdominal pain started today. ABC Intact alert and no distress. Glucose checked at home was 71. ABC Intact alert and no distress.

## 2018-02-09 NOTE — ED PROVIDER NOTES
History     Chief Complaint:  Nausea & Vomiting    The history is provided by the patient and a caregiver.      Nena Tang is a 58 year old female with medical history including IDDM, hypertension, and schizoaffective disorder with recent psychiatric admission for suicidal ideation, who presents with her group home for evaluation of nausea and vomiting. The patient reports she has been nauseous and had 3 episodes of emesis that started this evening. She also states she has diffuse upper abdominal pain that began this morning and that she has had a cough for a couple of days. Her  reports she started a new psychiatric medication (Invega) this morning and was told to watch for nausea and vomiting with this change in medication. She notes the patient has Compazine for nausea but was unable to take it due to her vomiting. She reports other people in the group home are sick with similar symptoms and that abdominal pain is normal for the patient. She denies fever or diarrhea. Group home caregiver notes she was most concerned that patient's glucose would drop too low during the night since the patient is vomiting and it decreased from >100 to 71 prior to arrival.    Allergies:  Imidazole Antifungals  Ketoprofen  Lisinopril  Metformin  Metronidazole  Posaconazole     Medications:    Invega  Melatonin  Symmetrel  Diphendryl  Lantus  Novolog Flexpen  Compazine  Zantac  Cozaar  Gabapentin  Senokot  Lipitor  Metoprolol  Miralax PRN  Trulicity  Aspirin  Glucose Chew    Past Medical History:    CAD  Chronic low back pain  Cocaine abuse, in remission  Fecal Urgency  Heroin Abuse  Hyperlipidemia  Hypertension  Illiterate  IBS  Left cataract  Migraine  Depression  Obesity  Osteopenia  Schizoaffective disorder  Suicidal ideations  Takotsubo cardiomyopathy  Type 2 diabetes  Uterine cancer  Verbal auditory hallucinations    Past Surgical History:    Oophorectomy  Cataract,  bilateral  Hysterectomy  Cholecystectomy  Phacoemulsification clear cornea with standard intraocular lens implant  Release trigger finger x2    Family History:    Bladder cancer  COPD  Cirrhosis  Alcohol/drug abuse  Diabetes  Hypertension  Hyperlipidemia  CAD  Glaucoma  Mental illness  Colon cancer    Social History:  Smoking status: Never smoker  History of Cocaine and Heroin abuse  Alcohol use: No   Marital Status:     Presents with group home care provider     Review of Systems   Constitutional: Negative for fever.   Respiratory: Positive for cough.    Gastrointestinal: Positive for abdominal pain, nausea and vomiting. Negative for diarrhea.   All other systems reviewed and are negative.      Physical Exam     Patient Vitals for the past 24 hrs:   BP Temp Temp src Pulse Resp SpO2   02/08/18 2315 - - - - - 99 %   02/08/18 2300 143/70 - - - - 99 %   02/08/18 2245 - - - - - 99 %   02/08/18 2230 142/73 - - - - 98 %   02/08/18 2204 143/65 - - - - 99 %   02/08/18 2125 - - - - - 100 %   02/08/18 2124 154/83 - - - - -   02/08/18 2111 (!) 169/93 96.9  F (36.1  C) Temporal 82 18 99 %      Physical Exam  General: Well-developed and obese; well appearing middle-aged  woman; cooperative  Head:  Atraumatic  Eyes:  Conjunctivae, lids, and sclerae are normal  ENT:    Normal oropharynx. Normal nose; moist mucous membranes  Neck:  Supple; normal range of motion  CV:  Regular rate and rhythm; normal heart sounds with no murmurs, rubs, or gallops  detected  Resp:  No respiratory distress; clear to auscultation bilaterally without decreased breath sounds, wheezing, rales, or rhonchi  GI:  Soft; non-distended; non-tender    MS:  Normal ROM.  Skin:  Warm; non-diaphoretic; no pallor  Neuro: Awake; A&Ox3; normal strength  Psych:  Normal mood and affect; normal speech  Vitals reviewed.    Emergency Department Course   Laboratory:  BMP: Creatinine 1.09 (H), GFR estimate 52 (L), o/w WNL   Lipase: 329  Hepatic Panel:  WNL  CBC: HGB 10.2 (L), WNL (WBC 6.4, )   Glucose by Meter: 76    Interventions:  2202: NS 1L IV Bolus   2203: Compazine 5 mg IV  2249: Compazine 5 mg IV     Emergency Department Course:  Past medical records, nursing notes, and vitals reviewed.  2140: I performed an exam of the patient and obtained history, as documented above.   IV inserted and blood drawn.     2355: I rechecked the patient. Explained findings to the patient and her caregiver. She is feeling well, has no abdominal tenderness, tolerated PO, and is ready to go.    Findings and plan explained to the patient and caregiver. Patient discharged home with instructions regarding supportive care, medications, and reasons to return. The importance of close follow-up was reviewed.      Impression & Plan    Medical Decision Making:  Nena is a 58-year-old female who presents with nausea and vomiting.  This is concerning to group home care staff because they could not give her any glucose tablets and her glucose was 71.  Patient endorses abdominal pain though she has no tenderness on exam and is well-appearing.  Grossly normal vital signs.    Accu-Chek here is 76.  She was given Compazine and an IV fluid bolus.  CBC, BMP, LFTs, and lipase are reassuring.  Patient tolerated PO challenge without emesis though she did have some nausea and was given a second 5 mg of Compazine.    After this patient was reevaluated and states she is feeling well.  She continues to have no abdominal tenderness on exam.  I discussed plan for discharge with use of home Compazine as needed for nausea and vomiting.  I discussed with group home staff this could possibly be due to reaction to her new medication though they should attempt at least 1 more day to see if she has recurrent symptoms or if the nausea and vomiting today is due to a different etiology.  I do not believe patient warrants imaging of her abdomen and she has no tenderness and has had improvement in her  vomiting, as well as normal labs.  She has no evidence for an acute or overwhelming infection or intra-abdominal process.  I did instruct group home staff to give only half dose of Lantus (20 units) given patient's glucose was 84 on BMP and I doubt she will eat tonight.  I recommended patient follow-up with primary care provider, particularly if she has return of nausea and vomiting after administration of her new medication tomorrow, as this may need to be stopped and patient should be on a different medication.  I provided return precautions and answered on the patient and the group home staff's questions.  They verbalized understanding and are amenable to discharge.      Diagnosis:    ICD-10-CM   1. Non-intractable vomiting with nausea, unspecified vomiting type R11.2     Disposition:  Discharged to group home with caregiver    Aniya Fulton  2/8/2018   [facility] EMERGENCY DEPARTMENT    I, Aniya Fulton, am serving as a scribe at 9:40 PM on 2/8/2018 to document services personally performed by Olga Coyne MD based on my observations and the provider's statements to me.       Olga Coyne MD  02/09/18 0109

## 2018-02-09 NOTE — ED NOTES
Initiated PO challenge with diet linette and crackers. Pt educated on taking small amount of liquids at a time.

## 2018-02-10 ENCOUNTER — HEALTH MAINTENANCE LETTER (OUTPATIENT)
Age: 58
End: 2018-02-10

## 2018-02-12 ENCOUNTER — OFFICE VISIT (OUTPATIENT)
Dept: OPHTHALMOLOGY | Facility: CLINIC | Age: 58
End: 2018-02-12
Attending: OPHTHALMOLOGY
Payer: MEDICARE

## 2018-02-12 ENCOUNTER — OFFICE VISIT (OUTPATIENT)
Dept: BEHAVIORAL HEALTH | Facility: CLINIC | Age: 58
End: 2018-02-12
Payer: MEDICARE

## 2018-02-12 ENCOUNTER — OFFICE VISIT (OUTPATIENT)
Dept: FAMILY MEDICINE | Facility: CLINIC | Age: 58
End: 2018-02-12
Payer: MEDICARE

## 2018-02-12 VITALS
BODY MASS INDEX: 44.48 KG/M2 | HEART RATE: 84 BPM | DIASTOLIC BLOOD PRESSURE: 62 MMHG | SYSTOLIC BLOOD PRESSURE: 110 MMHG | TEMPERATURE: 99.1 F | WEIGHT: 228.5 LBS | OXYGEN SATURATION: 97 % | RESPIRATION RATE: 16 BRPM

## 2018-02-12 DIAGNOSIS — Z96.1 PSEUDOPHAKIA, BOTH EYES: ICD-10-CM

## 2018-02-12 DIAGNOSIS — E66.01 MORBID OBESITY (H): ICD-10-CM

## 2018-02-12 DIAGNOSIS — F25.1 SCHIZOAFFECTIVE DISORDER, DEPRESSIVE TYPE (H): Primary | ICD-10-CM

## 2018-02-12 DIAGNOSIS — F43.10 POSTTRAUMATIC STRESS DISORDER: ICD-10-CM

## 2018-02-12 DIAGNOSIS — E11.3299 TYPE 2 DIABETES MELLITUS WITH MILD NONPROLIFERATIVE RETINOPATHY WITHOUT MACULAR EDEMA, WITH LONG-TERM CURRENT USE OF INSULIN, UNSPECIFIED LATERALITY (H): Primary | ICD-10-CM

## 2018-02-12 DIAGNOSIS — Z79.4 TYPE 2 DIABETES MELLITUS WITH BOTH EYES AFFECTED BY MILD NONPROLIFERATIVE RETINOPATHY WITHOUT MACULAR EDEMA, WITH LONG-TERM CURRENT USE OF INSULIN (H): Primary | ICD-10-CM

## 2018-02-12 DIAGNOSIS — E11.3293 TYPE 2 DIABETES MELLITUS WITH BOTH EYES AFFECTED BY MILD NONPROLIFERATIVE RETINOPATHY WITHOUT MACULAR EDEMA, WITH LONG-TERM CURRENT USE OF INSULIN (H): Primary | ICD-10-CM

## 2018-02-12 DIAGNOSIS — Z79.4 TYPE 2 DIABETES MELLITUS WITH MILD NONPROLIFERATIVE RETINOPATHY WITHOUT MACULAR EDEMA, WITH LONG-TERM CURRENT USE OF INSULIN, UNSPECIFIED LATERALITY (H): Primary | ICD-10-CM

## 2018-02-12 DIAGNOSIS — R82.90 ABNORMAL URINE ODOR: ICD-10-CM

## 2018-02-12 DIAGNOSIS — G43.109 MIGRAINE WITH AURA AND WITHOUT STATUS MIGRAINOSUS, NOT INTRACTABLE: ICD-10-CM

## 2018-02-12 LAB
ALBUMIN UR-MCNC: NEGATIVE MG/DL
APPEARANCE UR: CLEAR
BACTERIA #/AREA URNS HPF: ABNORMAL /HPF
BILIRUB UR QL STRIP: NEGATIVE
COLOR UR AUTO: YELLOW
GLUCOSE UR STRIP-MCNC: 500 MG/DL
HBA1C MFR BLD: 6.8 % (ref 4.3–6)
HGB UR QL STRIP: NEGATIVE
KETONES UR STRIP-MCNC: NEGATIVE MG/DL
LEUKOCYTE ESTERASE UR QL STRIP: ABNORMAL
NITRATE UR QL: NEGATIVE
NON-SQ EPI CELLS #/AREA URNS LPF: ABNORMAL /LPF
PH UR STRIP: 6 PH (ref 5–7)
RBC #/AREA URNS AUTO: ABNORMAL /HPF
SOURCE: ABNORMAL
SP GR UR STRIP: 1.01 (ref 1–1.03)
UROBILINOGEN UR STRIP-ACNC: 0.2 EU/DL (ref 0.2–1)
WBC #/AREA URNS AUTO: ABNORMAL /HPF

## 2018-02-12 PROCEDURE — 99215 OFFICE O/P EST HI 40 MIN: CPT | Performed by: NURSE PRACTITIONER

## 2018-02-12 PROCEDURE — 83036 HEMOGLOBIN GLYCOSYLATED A1C: CPT | Performed by: NURSE PRACTITIONER

## 2018-02-12 PROCEDURE — G0463 HOSPITAL OUTPT CLINIC VISIT: HCPCS | Mod: ZF

## 2018-02-12 PROCEDURE — 81001 URINALYSIS AUTO W/SCOPE: CPT | Performed by: NURSE PRACTITIONER

## 2018-02-12 PROCEDURE — 90834 PSYTX W PT 45 MINUTES: CPT | Performed by: SOCIAL WORKER

## 2018-02-12 PROCEDURE — 36415 COLL VENOUS BLD VENIPUNCTURE: CPT | Performed by: NURSE PRACTITIONER

## 2018-02-12 PROCEDURE — 92134 CPTRZ OPH DX IMG PST SGM RTA: CPT | Mod: ZF | Performed by: OPHTHALMOLOGY

## 2018-02-12 RX ORDER — SUMATRIPTAN 25 MG/1
25-50 TABLET, FILM COATED ORAL
Qty: 12 TABLET | Refills: 1 | Status: SHIPPED | OUTPATIENT
Start: 2018-02-12 | End: 2019-07-28

## 2018-02-12 ASSESSMENT — TONOMETRY
IOP_METHOD: TONOPEN
OS_IOP_MMHG: 18
OD_IOP_MMHG: 18

## 2018-02-12 ASSESSMENT — VISUAL ACUITY
OS_PH_SC: 20/30
OD_SC: 20/30
METHOD: SNELLEN - LINEAR
OS_SC: 20/40

## 2018-02-12 ASSESSMENT — CUP TO DISC RATIO
OD_RATIO: 0.3
OS_RATIO: 0.3

## 2018-02-12 ASSESSMENT — EXTERNAL EXAM - RIGHT EYE: OD_EXAM: NORMAL

## 2018-02-12 ASSESSMENT — SLIT LAMP EXAM - LIDS
COMMENTS: NORMAL
COMMENTS: NORMAL

## 2018-02-12 ASSESSMENT — CONF VISUAL FIELD
OD_NORMAL: 1
OS_NORMAL: 1

## 2018-02-12 ASSESSMENT — EXTERNAL EXAM - LEFT EYE: OS_EXAM: NORMAL

## 2018-02-12 NOTE — PROGRESS NOTES
I have confirmed the patient's CC, HPI and reviewed Past Medical History, Past Surgical History, Social History, Family History, Problem List, Medication List and agree with Tech note.    CC: Follow up Diabetes mellitus retinopathy  HPI: 57YO F Hx of DMII here for diabetic exam. DMII x 13 years. On oral and insulin. Last hgA1c 8.9% in 12/9/17. Glucose under more control. Hoping to get cataract surgery with Dr. Diaz now.    OCT Macula 2/12/18  OD: center involving intra-retinal fluid temporal macula with slight interval worsening, no subretinal fluid, exudates  OS: temporal intraretinal fluid     Assessment/plan   1.  DMII: severe NPDR   - last HgA1c 8.9 12/9/2017   - emphasized importance of good blood glucose/blood pressure control     2. Diabetic macular edema both eyes   - center-involving right eye; ->310   - consider injections right eye and focal left eye    - closely observe for now with low threshold with injecitons    3. Myopia OS with secondary amblyopia              Does not wear glasses normally     4.  Pseudophakia both eyes    - Mild PCO in both eyes    - Monitor    Return 2 months for exam and OCT OU  (ordered)    Albino Huffman MD  Ophthalmology, PGY-3    ATTESTATION:  I have seen and examined the patient with Dr. Huffman and agree with the findings in this note, as well as the interpretations of the diagnostic tests.     Tiburcio Martin MD PhD.  Professor & Chair

## 2018-02-12 NOTE — PROGRESS NOTES
[facility] - Integrated Primary Care   February 12, 2018      Behavioral Health Clinician Progress Note    Patient Name: Nena Tang           Service Type:  Individual      Service Location:   Face to Face in Clinic     Session Start Time: 1:16pm  Session End Time: 1:55pm      Session Length: 38 - 52      Attendees: Patient, PCP and caregiver    Visit Activities (Refresh list every visit): [facility] [place] [facility]    Diagnostic Assessment Date: 1-23-18  Treatment Plan Review Date: Not completed yet  See Flowsheets for today's PHQ-9 and TAMIA-7 results  Previous PHQ-9:   PHQ-9 SCORE 12/20/2016 1/2/2017 2/3/2017   Total Score - - -   Total Score - - -   Total Score 7 0 0     Previous TAMIA-7:   TAMIA-7 SCORE 12/20/2016 1/2/2017 2/3/2017   Total Score - - -   Total Score 6 0 0   Total Score - - -       ALEXANDRA LEVEL:  ALEXANDRA Score (Last Two) 8/30/2011 6/9/2014   ALEXANDRA Raw Score 42 37   Activation Score 66 49.9   ALEXANDRA Level 3 2       DATA  Extended Session (60+ minutes): No  Interactive Complexity: No  Crisis: No  [facility] Patient: No    Treatment Objective(s) Addressed in This Session:  Target Behavior(s): disease management/lifestyle changes mental health    Depressed Mood: Increase interest, engagement, and pleasure in doing things  Decrease frequency and intensity of feeling down, depressed, hopeless  Improve quantity and quality of night time sleep / decrease daytime naps  Feel less tired and more energy during the day   Identify negative self-talk and behaviors: challenge core beliefs, myths, and actions  Improve concentration, focus, and mindfulness in daily activities   Decrease thoughts that you'd be better off dead or of suicide / self-harm  Anxiety: will experience a reduction in anxiety, will develop more effective coping skills to manage anxiety symptoms, will develop healthy cognitive patterns and beliefs and will increase ability to function adaptively  Alcohol / Substance Use: continue to make healthy choices regarding  "substance use and engage in activities / supportive services that promote sobriety  Thought Disturbance: will develop skills to more effectively manage symptoms, will develop more effective support systems and will continue to take medications as prescribed    Current Stressors / Issues:  The [facility] intern met with the patient at the PCP's request. We discussed Nena's most recent ED visit with nausea symptoms and addressed her concern about this being due to a recent medication she started. We also discussed her blood sugar numbers and the importance of consistently checking her numbers, eating, and fasting the same time every day to see the best results. Her caregiver was not aware of this and said he will work with Nena to make this happen. We also talked about her a1c numbers and this writer helped facilitate a conversation between the patient and caregiver and PCP to better understand how Nena can lower her a1c numbers, as Nena is having poor eyesight due to high a1c levels. Nena said she wants to lose weight by eating healthier and exercising more so she can decrease her a1c numbers and improve her eyesight. She said she has tried to lose weight before by \"starving herself\" and the PCP provided education on how to safely lose weight using diet and exercise. Nena said she is also having trouble sleeping, going to bed at midnight and waking up around 8. She said she wants to go to sleep earlier but does not know how. We agreed for Nena to program her TV to automatically shut off 15 minutes earlier each night until she finds a bed time that she is happy with. Nena said she has had a stable mood since her last visit with \"average\" anxiety. She is thinking about her 's death anniversary on February 15, and said her sister is sick and may not get better so she is deciding whether she wants to travel to [place] because \"she may not get to see her again.\" At the end of the visit, this writer " reaffirmed Nena's progress and commitment to her health.     Progress on Treatment Objective(s) / Homework:  Minimal progress - PREPARATION (Decided to change - considering how); Intervened by negotiating a change plan and determining options / strategies for behavior change, identifying triggers, exploring social supports, and working towards setting a date to begin behavior change    Motivational Interviewing    MI Intervention: Expressed Empathy/Understanding, Supported Autonomy, Collaboration, Evocation, Permission to raise concern or advise, Open-ended questions, Reflections: simple and complex, Rolled with resistance: Emphasized patient autonomy, Simple reflection and Evoked patient agenda and Change talk (evoked)     Change Talk Expressed by the Patient: Desire to change Ability to change Reasons to change Need to change Activation Taking steps    Provider Response to Change Talk: E - Evoked more info from patient about behavior change, A - Affirmed patient's thoughts, decisions, or attempts at behavior change, R - Reflected patient's change talk and S - Summarized patient's change talk statements      Care Plan review completed: No    Medication Review:  No changes to current psychiatric medication(s)    Medication Compliance:  NA    Changes in Health Issues:   None reported    Chemical Use Review:   Substance Use: Chemical use reviewed, no active concerns identified      Tobacco Use: No current tobacco use.      Assessment: Current Emotional / Mental Status (status of significant symptoms):  Risk status (Self / Other harm or suicidal ideation)  Patient has had a history of suicidal ideation: yes  Patient denies current fears or concerns for personal safety.  Patient denies current or recent suicidal ideation or behaviors.  Patient denies current or recent homicidal ideation or behaviors.  Patient denies current or recent self injurious behavior or ideation.  Patient denies other safety concerns.  A  safety and risk management plan has not been developed at this time, however patient was encouraged to call [place] [phone] should there be a change in any of these risk factors.    Appearance:   Appropriate   Eye Contact:   Good   Psychomotor Behavior: Normal   Attitude:   Cooperative   Orientation:   All  Speech   Rate / Production: Normal    Volume:  Normal   Mood:    Normal Sad   Affect:    Appropriate   Thought Content:  Clear   Thought Form:  Coherent  Goal Directed  Logical   Insight:    Fair     Diagnoses:  1. Schizoaffective disorder, depressive type (H)    2. Posttraumatic stress disorder      Collateral Reports Completed:  Not Applicable    Plan: (Homework, other):  Patient was given information about behavioral services and encouraged to schedule a follow up appointment with the clinic [facility] in 2 weeks.  She was also given information about mental health symptoms and treatment options .  CD Recommendations: Maintain Sobriety. This note was written by Dennis Diana, [facility] intern and reviewed by BIN George, [facility]      ______________________________________________________________________

## 2018-02-12 NOTE — PROGRESS NOTES
SUBJECTIVE:                                                    Nena Tang is a 58 year old  female who presents to clinic today for the following health issues:  [facility], [place]: Hussein Diana (Intern)    ED/UC Followup:  Facility:  [facility] ED   Date of visit: 02/08/2018-02/09/2018  Reason for visit: Non- Intractable vomiting with nausea , unspecified vomiting typing   Current Status: Dizziness and nausea x 4 - 5 days   Patient was concerned that her new psychiatric medication (paliperidone) might be causing her the nausea, and vomiting. Discussed with patient and care giver that the patient was already having the symptoms prior to these ED visit. Encouraged patient to continue with the medication until she follows up with the psychiatrist on 3/9/2018       Mental Health Follow up   Patient states that her mood has been stable since the last visit. Patient is glad that she is not shaking at all since her psychiatrist stopped her haldol.   Patient reports some anxiety about her 's anniversary that's coming up. Patient also concerned about her sister that is sick in [place] and worried that she might not see her again due to her illness that is not treatable.     Status since last visit: some improvement.     See PHQ-9 for current symptoms.  Other associated symptoms: None    Complicating factors: diabetes concern.   Significant life event:  No   Current substance abuse:  None  Anxiety or Panic symptoms:  No    Sleep - sleeping at midnight and waking up at 6 in the morning.   Appetite - able to eat and keep some food down. Patient is on prochlorperazine as needed for the nausea.   Exercise - none. Would like to increase her activity level to help with her weight loss.     Smoking - no  Alcohol - no  Street drugs - no  Marijuana - no  Caffeine - Soda.     PHQ-9  English PHQ-9   Any Language           Diabetes Follow-up  Blood sugar ranging between  form the group home report.  Patient states that she only uses her novolog with meal times.   Patient would like to work on her weight management as part of her diabetes management. Patient states that she wants to lose weight but it is a challenge for her because she cannot walk for a period of time.   Patient is checking blood sugars: four times daily.    Blood sugar testing frequency justification: Uncontrolled diabetes     Symptoms of hypoglycemia (low blood sugar): weak     Paresthesias (numbness or burning in feet) or sores: No     Date of last diabetic eye exam: patient has an eye appointment today.     BP Readings from Last 2 Encounters:   02/12/18 110/62   02/09/18 161/84     Hemoglobin A1C (%)   Date Value   12/09/2017 8.9 (H)   11/07/2017 8.9 (H)     LDL Cholesterol Calculated (mg/dL)   Date Value   06/27/2017 64   07/14/2015 78     LDL Cholesterol Direct (mg/dL)   Date Value   07/13/2016 137 (H)         Problems taking medications regularly: No    Medication side effects: none    Diet: regular (no restrictions)    Social History   Substance Use Topics     Smoking status: Never Smoker     Smokeless tobacco: Never Used     Alcohol use No      Comment: last month        Problem list and histories reviewed & adjusted, as indicated.  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 (H)     Illiterate     Irritable bowel syndrome     overweight - BMI >35     Takotsubo cardiomyopathy     CAD (coronary artery disease)     Restless legs syndrome (RLS)     CINDY (obstructive sleep apnea)- mild AHI 10.3     Verbal auditory hallucination     Chronic low back pain     Schizoaffective disorder, depressive type (H)     Migraine headache     HTN, goal below 140/90     Health Care Home     Lumbago     Cervicalgia     Cocaine abuse, episodic      Suicidal ideation     Esophageal reflux     Mild nonproliferative diabetic retinopathy (H)     Tardive dyskinesia     Alcohol use     Left cataract     Falls frequently     History of uterine cancer     Psychophysiological insomnia     Dysuria     Asymptomatic postmenopausal status     Abdominal pain, right lower quadrant     Sepsis (H)     Pneumonia of right lower lobe due to infectious organism (H)     Infectious encephalopathy     Non-intractable vomiting with nausea     Acute respiratory failure with hypoxia (H)     Thoughts of self harm     Gastroenteritis     Posttraumatic stress disorder     Past Surgical History:   Procedure Laterality Date     C OOPHORECTORMY FOR MALIG, W/BX  1983    UTERINE     CATARACT IOL, RT/LT Bilateral 2017     COLONOSCOPY N/A 3/16/2017    Procedure: COLONOSCOPY;  Surgeon: Traci Gonzalez MD;  Location:  GI     Coronary CTA  5/21/2014     HYSTERECTOMY  1983    uterine cancer yearly pap's per provider.     LAPAROSCOPIC CHOLECYSTECTOMY  2008     PHACOEMULSIFICATION CLEAR CORNEA WITH STANDARD INTRAOCULAR LENS IMPLANT Left 5/5/2017    Procedure: PHACOEMULSIFICATION CLEAR CORNEA WITH STANDARD INTRAOCULAR LENS IMPLANT;  LEFT EYE PHACOEMULSIFICATION CLEAR CORNEA WITH STANDARD INTRAOCULAR LENS IMPLANT ;  Surgeon: Tyra Diaz MD;  Location: [facility]     PHACOEMULSIFICATION CLEAR CORNEA WITH STANDARD INTRAOCULAR LENS IMPLANT Right 6/30/2017    Procedure: PHACOEMULSIFICATION CLEAR CORNEA WITH STANDARD INTRAOCULAR LENS IMPLANT;  RIGHT EYE PHACOEMULSIFICATION CLEAR CORNEA WITH STANDARD INTRAOCULAR LENS IMPLANT;  Surgeon: Tyra Diaz MD;  Location:  EC     RELEASE TRIGGER FINGER  10/11/2012    Left thumb. Procedure: RELEASE TRIGGER FINGER;  LEFT THUMB TRIGGER RELEASE;  Surgeon: Tay Langley MD;  Location:  SD     RELEASE TRIGGER FINGER Right 12/26/2016    Procedure: RELEASE TRIGGER FINGER;  Surgeon: Albino Castañeda MD;  Location:  OR       Social History    Substance Use Topics     Smoking status: Never Smoker     Smokeless tobacco: Never Used     Alcohol use No      Comment: last month     Family History   Problem Relation Age of Onset     CANCER Mother      BLADDER     Respiratory Mother      COPD     GASTROINTESTINAL DISEASE Mother      CIRRHOSIS OF LI BOLIVAR     Alcohol/Drug Mother      DIABETES Mother      Hypertension Mother      Lipids Mother      C.A.D. Mother      Glaucoma Mother      Alcohol/Drug Sister      MENTAL ILLNESS Sister      Alcohol/Drug Sister      Psychotic Disorder Sister      CANCER Maternal Grandmother      UNKNOWN TYPE     CANCER Brother      COLON     Cancer - colorectal Brother      IN HIS LATE 30S     Alcohol/Drug Brother       OF HEROIN OVERDOSE AT AGE 22 YRS     Macular Degeneration No family hx of            Current Outpatient Prescriptions   Medication Sig Dispense Refill     paliperidone (INVEGA) 6 MG 24 hr tablet Take 1 tablet (6 mg) by mouth every morning 30 tablet 0     acetaminophen (TYLENOL) 500 MG tablet Take 2 tablets (1,000 mg) by mouth 3 times daily as needed for mild pain 100 tablet 0     vitamin D3 (CHOLECALCIFEROL) 18299 UNITS capsule TAKE 1 CAPSULE (50,000 UNITS) BY MOUTH ONCE A WEEK 4 capsule 3     melatonin 5 MG CAPS Take 2 capsules by mouth daily At dinnertime 180 capsule 3     blood glucose monitoring (ONETOUCH VERIO IQ) test strip Use to test blood sugar 4 times daily .  Ok to substitute alternative if insurance prefers. 200 strip 11     amantadine (SYMMETREL) 100 MG capsule Take 1 capsule (100 mg) by mouth 2 times daily 60 capsule 0     NOVOFINE 30 30G X 8 MM insulin pen needle USE 4 DAILY OR AS DIRECTED 100 each 1     DiphenhydrAMINE HCl (DIPHENDRYL PO) Take 25 mg by mouth every 6 hours as needed for itching       clotrimazole (LOTRIMIN) 1 % cream Apply topically 2 times daily       insulin glargine (LANTUS) 100 UNIT/ML injection Inject 45 Units Subcutaneous At Bedtime (Patient taking differently: Inject 45 Units  Subcutaneous At Bedtime Lantus dose is 45 units Q HS) 3 mL 11     insulin aspart (NOVOLOG FLEXPEN) 100 UNIT/ML injection Inject 20 Units Subcutaneous 3 times daily (with meals) Once daily, can add additional 5 units if BGs are >500mg/dL. 15 mL 11     prochlorperazine (COMPAZINE) 5 MG tablet Take 1 tablet (5 mg) by mouth every 6 hours as needed for nausea or vomiting 90 tablet 0     ranitidine (ZANTAC) 300 MG tablet TAKE 1 TABLET (300MG) BY MOUTH AT BEDTIME 90 tablet 1     order for DME Diabetic Shoes 2 each 0     losartan (COZAAR) 100 MG tablet TAKE 1 TABLET (100MG) BY MOUTH DAILY 28 tablet 3     gabapentin (NEURONTIN) 300 MG capsule TAKE 2 CAPSULES (600MG) BY MOUTH THREE TIMES A  capsule 3     blood glucose monitoring (ONE TOUCH DELICA) lancets Use to test blood sugar 2 times daily or as directed.  Ok to substitute alternative if insurance prefers. 150 each 11     albuterol (PROAIR HFA/PROVENTIL HFA/VENTOLIN HFA) 108 (90 BASE) MCG/ACT Inhaler Inhale 2 puffs into the lungs every 6 hours as needed for shortness of breath / dyspnea or wheezing 3 Inhaler 1     senna-docusate (SENOKOT-S;PERICOLACE) 8.6-50 MG per tablet Take 1 tablet by mouth 2 times daily as needed for constipation       ACETAMINOPHEN PO Take 1,000 mg by mouth every 6 hours as needed for pain or fever       atorvastatin (LIPITOR) 80 MG tablet TAKE 1 TABLET (80MG) BY MOUTH DAILY 28 tablet 11     metoprolol (TOPROL-XL) 100 MG 24 hr tablet TAKE 1 TABLET (100MG) BY MOUTH DAILY 28 tablet 11     polyethylene glycol (MIRALAX/GLYCOLAX) powder TAKE 17 GRAMS (1 CAPFUL) BY MOUTH DAILY 527 g 3     TRULICITY 1.5 MG/0.5ML pen INJECT 1.5MG SUBCUTANEOUSLY EVERY SEVEN DAYS 2 mL 11     aspirin (ASPIRIN LOW DOSE) 81 MG EC tablet Take 1 tablet (81 mg) by mouth daily 100 tablet 4     ibuprofen (ADVIL,MOTRIN) 600 MG tablet Take 1 tablet (600 mg) by mouth every 4 hours as needed for moderate pain 60 tablet 0     glucose 4 G CHEW Take 2 every 15 minutes for blood sugar  <70mg/dL. Recheck blood sugar every 15 minutes until above 70mg/dL, then eat a substantial meal. 20 tablet 1     order for DME Hold Novolog if meals are refused. 1 each 0     Allergies   Allergen Reactions     Imidazole Antifungals Hives     Tolerates diflucan     Ketoprofen Itching     Pruritis to topical     Lisinopril Hives     Metformin Other (See Comments)     Patient hospitalized for lactic acidosis - admitting provider suspectd caused by metformin     Metronidazole Hives     Posaconazole Hives     Tolerates diflucan     Recent Labs   Lab Test  02/08/18   2155  01/23/18   1526   01/13/18   2315  12/09/17   0748   11/07/17   0801  10/23/17   1515   06/27/17   1358   12/29/16   0909   07/13/16   1350   07/14/15   1215   09/03/13   1156   A1C   --    --    --    --   8.9*   --   8.9*  8.8*   < >  7.0*   < >   --    < >   --    < >  9.1*   < >  10.8*   LDL   --    --    --    --    --    --    --    --    --   64   --    --    --   137*   --   78   < >  90   HDL   --    --    --    --    --    --    --    --    --   37*   --    --    --    --    --   39*   --   37*   TRIG   --    --    --    --    --    --    --    --    --   267*   --    --    --    --    --   151*   --   171*   ALT  23   --    --   29   --    --   23  23   < >  32   < >  26   < >   --    < >   --    < >  38   CR  1.09*  0.91   < >  1.62*   --    < >  0.88  0.98   < >  0.78   < >  0.72   < >   --    < >  0.67   < >  0.54   GFRESTIMATED  52*  64   < >  33*   --    < >  66  58*   < >  76   < >  83   < >   --    < >  >90  Non  GFR Calc     < >  >90   GFRESTBLACK  63  77   < >  40*   --    < >  80  71   < >  >90   GFR Calc     < >  >90   GFR Calc     < >   --    < >  >90   GFR Calc     < >  >90   POTASSIUM  4.3  4.1   --   4.0   --    < >  3.9  4.6   < >  4.3   < >  4.2   < >   --    < >  4.3   < >  4.4   TSH   --    --    --    --    --    --   3.21   --    --    --    --   2.24   < >    --    < >   --    < >  1.42    < > = values in this interval not displayed.      BP Readings from Last 3 Encounters:   02/09/18 161/84   01/23/18 130/80   01/17/18 113/49    Wt Readings from Last 3 Encounters:   01/23/18 226 lb 8 oz (102.7 kg)   01/18/18 219 lb 4.8 oz (99.5 kg)   12/29/17 221 lb 8 oz (100.5 kg)        Labs reviewed in EPIC  Problem list, Medication list, Allergies, and Medical/Social/Surgical histories reviewed in [facility] and updated as appropriate.     ROS: Constitutional, neuro, ENT, endocrine, pulmonary, cardiac, gastrointestinal, genitourinary, musculoskeletal, integument and psychiatric systems are negative, except as otherwise noted above in the HPI.   OBJECTIVE:                                                    /62  Pulse 84  Temp 99.1  F (37.3  C) (Oral)  Resp 16  Wt 228 lb 8 oz (103.6 kg)  LMP 01/06/2015  SpO2 97%  BMI 44.48 kg/m2  Body mass index is 44.48 kg/(m^2).  GENERAL: obese, alert, well nourished, well hydrated, no distress.  EYES: Eyes grossly normal to inspection, extraocular movements - intact, and PERRL  NECK: no tenderness, no adenopathy, no asymmetry, no masses, no stiffness; thyroid- normal to palpation  RESP: lungs clear to auscultation - no rales, no rhonchi, no wheezes  CV: regular rates and rhythm, normal S1 S2, no S3 or S4 and no murmur, no click or rub - no homans or cords  ABDOMEN: soft, no tenderness,  normal bowel sounds  MS: extremities- no gross deformities noted, no edema  SKIN: no suspicious lesions, no rashes  NEURO: strength and tone- normal, sensory exam- grossly normal, mentation- intact, speech- normal, Non focal no aphasia. No facial asymmetry.  BACK: no CVA tenderness, no paralumbar tenderness  LYMPHATICS: ant. cervical- normal, post. cervical- normal, supraclavicular- normal  Mental Status Assessment:  Appearance:   Appropriate   Eye Contact:   Good   Psychomotor Behavior: Normal   Attitude:   Cooperative   Orientation:   All  Speech   Rate /  Production: Normal    Volume:  Normal   Mood:    Depressed   Affect:    Lethargic   Thought Content:  Clear   Thought Form:  Coherent  Logical   Insight:    Fair   Attention Span and Concentration: appropriate.   Recent and Remote Memory:  intact  Fund of Knowledge: appropriate  Muscle Strength and Tone: normal   Suicidal Ideation: reports no thoughts, no intention  Hallucination: Yes, reports seeing a man at night if the light is off.   Paranoid-No  Manic-No  Panic-No  Self harm-No      See [facility] notes     Diagnostic Test Results:  Results for orders placed or performed in visit on 02/12/18   Hemoglobin A1c   Result Value Ref Range    Hemoglobin A1C 6.8 (H) 4.3 - 6.0 %   UA with Microscopic   Result Value Ref Range    Color Urine Yellow     Appearance Urine Clear     Glucose Urine 500 (A) NEG^Negative mg/dL    Bilirubin Urine Negative NEG^Negative    Ketones Urine Negative NEG^Negative mg/dL    Specific Gravity Urine 1.015 1.003 - 1.035    pH Urine 6.0 5.0 - 7.0 pH    Protein Albumin Urine Negative NEG^Negative mg/dL    Urobilinogen Urine 0.2 0.2 - 1.0 EU/dL    Nitrite Urine Negative NEG^Negative    Blood Urine Negative NEG^Negative    Leukocyte Esterase Urine Small (A) NEG^Negative    Source Midstream Urine     WBC Urine 2-5 (A) OTO2^O - 2 /HPF    RBC Urine O - 2 OTO2^O - 2 /HPF    Squamous Epithelial /LPF Urine Moderate (A) FEW^Few /LPF    Bacteria Urine Few (A) NEG^Negative /HPF     *Note: Due to a large number of results and/or encounters for the requested time period, some results have not been displayed. A complete set of results can be found in Results Review.        ASSESSMENT/PLAN:                                                    (E11.3299,  Z79.4) 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   Lab Results   Component Value Date    A1C 6.8  02/12/2018    A1C 8.9 12/09/2017    A1C 8.9 11/07/2017    A1C 8.8 10/23/2017    A1C 7.6 08/30/2017     Plan: Hemoglobin A1c        -Continue with current insulin dosing with no changes.     (E66.01) Morbid obesity (H)  Comment: Body mass index is 44.48 kg/(m^2).  Wt Readings from Last 4 Encounters:   02/12/18 228 lb 8 oz (103.6 kg)   01/23/18 226 lb 8 oz (102.7 kg)   01/18/18 219 lb 4.8 oz (99.5 kg)   12/29/17 221 lb 8 oz (100.5 kg)     Plan: BARIATRIC ADULT REFERRAL        Discussed some healthy food options with patients: increasing vegetables, high fiber foods, and water intake.   -Patient to call and schedule with the weight clinic for help with management.   -Encourage patient to start increasing her activity level.     (G43.109) Migraine with aura and without status migrainosus, not intractable  Comment: Patient reporting headaches that are not controlled by tylenol or ibuprofen. Reports some nausea and vomiting that also happens when she is having the headaches.   Plan: SUMAtriptan (IMITREX) 25 MG tablet        Discussed a trial of Imitrex with a follow-up appointment in two weeks if no changes.     (R82.90) Abnormal urine odor  Comment: Patient reporting an odor to her urine, denies any dysuria, urgency or other symptoms.   Plan: UA with Microscopic        Encouraged patient to increase her water intake.  -No antibiotics treatment at this time; this will be reconsidered if symptomatic.       Patient Instructions   -Bariatric clinic referral.  -Labs today.   -Imitrex for migraine headaches.     I spent Greater than 40 minutes was spent face to face with Nena Tang, with greater than 50 % in counselling and consultation about Diabetes, weight management, and       ART Fay [facility] PRIMARY [facility]

## 2018-02-12 NOTE — MR AVS SNAPSHOT
After Visit Summary   2/12/2018    Nena Tang    MRN: [id number]           Patient Information     Date Of Birth          1960        Visit Information        Provider Department      2/12/2018 1:00 PM Dennis Diana [facility]        Today's Diagnoses     Schizoaffective disorder, depressive type (H)    -  1    Posttraumatic stress disorder           Follow-ups after your visit        Your next 10 appointments already scheduled     Mar 02, 2018 10:30 AM CST   Return Visit with Richardson Lopez [facility] ([facility])    [address]  [address]  [facility] [address]   [phone]            Mar 09, 2018 11:30 AM CST   Return Visit with Kraig Pedersen MD   [facility] ([facility])    [address]  [facility] [address]   [phone]            Mar 13, 2018 10:00 AM CDT   Return Visit with ART Arias [facility] ([facility])    [address]   [address]  [facility] [address]   [phone]            Mar 13, 2018 10:00 AM CDT   SHORT with Eugenia De Jesus [facility] [place] ([facility])    [address] [place] [address]  [address]  [facility] [address]   [phone]            Mar 13, 2018 10:00 AM CDT   Return Visit with Richadrson Lopez [facility] ([facility])    [address]  [address]  [facility] [address]   [phone]            Apr 12, 2018 12:30 PM CDT   RETURN RETINA with Tiburcio Chacon MD   [facility] ([facility])    [address] [place] [address]   [phone]              Who to contact     If you  have questions or need follow up information about today's clinic visit or your schedule please contact [facility] directly at [phone].  Normal or non-critical lab and imaging results will be communicated to you by MyChart, letter or phone within 4 business days after the clinic has received the results. If you do not hear from us within 7 days, please contact the clinic through [employer] or phone. If you have a critical or abnormal lab result, we will notify you by phone as soon as possible.  Submit refill requests through [facility] or call your pharmacy and they will forward the refill request to us. Please allow 3 business days for your refill to be completed.          Additional Information About Your Visit        [employer] Information     [facility] gives you secure access to your electronic health record. If you see a primary care provider, you can also send messages to your care team and make appointments. If you have questions, please call your primary care clinic.  If you do not have a primary care provider, please call [phone] and they will assist you.        Care EveryWhere ID     This is your Care EveryWhere ID. This could be used by other organizations to access your [facility] medical records  [id number]        Your Vitals Were     Last Period                   01/06/2015            Blood Pressure from Last 3 Encounters:   02/12/18 110/62   02/09/18 161/84   01/23/18 130/80    Weight from Last 3 Encounters:   02/12/18 228 lb 8 oz (103.6 kg)   01/23/18 226 lb 8 oz (102.7 kg)   01/18/18 219 lb 4.8 oz (99.5 kg)              Today, you had the following     No orders found for display         Today's Medication Changes          These changes are accurate as of 2/12/18 11:59 PM.  If you have any questions, ask your nurse or doctor.               Start taking these medicines.        Dose/Directions    SUMAtriptan 25 MG tablet   Commonly known as:  IMITREX   Used for:  Migraine  with aura and without status migrainosus, not intractable   Started by:  Rowena Haas APRN CNP        Dose:  25-50 mg   Take 1-2 tablets (25-50 mg) by mouth at onset of headache for migraine May repeat in 2 hours. Max 8 tablets/24 hours.   Quantity:  12 tablet   Refills:  1         These medicines have changed or have updated prescriptions.        Dose/Directions    insulin glargine 100 UNIT/ML injection   Commonly known as:  LANTUS   This may have changed:  additional instructions   Used for:  Type 2 diabetes mellitus with mild nonproliferative retinopathy without macular edema, with long-term current use of insulin, unspecified laterality (H)        Dose:  45 Units   Inject 45 Units Subcutaneous At Bedtime   Quantity:  3 mL   Refills:  11            Where to get your medicines      These medications were sent to [address] [address]     Phone:  [phone]     SUMAtriptan 25 MG tablet                Primary Care Provider Office Phone # Fax #    ART Arias [phone]       [address] [facility] [address]  [facility] [address]        Goals        General    Medical (pt-stated)     Notes - Note created  7/7/2016  9:15 AM by Chelsea Pulido, RN    Get blood sugars under control    Improve medication compliance    As of today's date 7/7/2016 goal is met at 0 - 25%.   Goal Status:  Active          Equal Access to Services     [facility] AH: Hadii aad ku hadasho Soomaali, waaxda luqadaha, qaybta kaalmada adeegyada, waxay dorcas hayfernando ellison . So [facility] [phone].    ATENCIÓN: Si habla español, tiene a tirnh disposición servicios gratuitos de asistencia lingüística. Llame al [phone].    We comply with applicable federal civil rights laws and [place] laws. We do not discriminate on the basis of race, color, national origin, age, disability, sex, sexual orientation, or gender  identity.            Thank you!     Thank you for choosing [facility]  for your care. Our goal is always to provide you with excellent care. Hearing back from our patients is one way we can continue to improve our services. Please take a few minutes to complete the written survey that you may receive in the mail after your visit with us. Thank you!             Your Updated Medication List - Protect others around you: Learn how to safely use, store and throw away your medicines at www.disposemymeds.org.          This list is accurate as of 2/12/18 11:59 PM.  Always use your most recent med list.                   Brand Name Dispense Instructions for use Diagnosis    * ACETAMINOPHEN PO      Take 1,000 mg by mouth every 6 hours as needed for pain or fever        * acetaminophen 500 MG tablet    TYLENOL    100 tablet    Take 2 tablets (1,000 mg) by mouth 3 times daily as needed for mild pain    Chronic low back pain, unspecified back pain laterality, with sciatica presence unspecified       albuterol 108 (90 BASE) MCG/ACT Inhaler    PROAIR HFA/PROVENTIL HFA/VENTOLIN HFA    3 Inhaler    Inhale 2 puffs into the lungs every 6 hours as needed for shortness of breath / dyspnea or wheezing    Dyspnea on exertion       aspirin 81 MG EC tablet    ASPIRIN LOW DOSE    100 tablet    Take 1 tablet (81 mg) by mouth daily    Coronary artery disease involving native heart with angina pectoris, unspecified vessel or lesion type (H)       atorvastatin 80 MG tablet    LIPITOR    28 tablet    TAKE 1 TABLET (80MG) BY MOUTH DAILY    Hyperlipidemia LDL goal <100       blood glucose monitoring lancets     150 each    Use to test blood sugar 2 times daily or as directed.  Ok to substitute alternative if insurance prefers.    Type 2 diabetes mellitus with diabetic neuropathy, with long-term current use of insulin (H)       blood glucose monitoring test strip    ONETOUCH VERIO IQ    200 strip    Use to test blood  sugar 4 times daily .  Ok to substitute alternative if insurance prefers.    Type 2 diabetes mellitus with diabetic neuropathy, with long-term current use of insulin (H)       clotrimazole 1 % cream    LOTRIMIN     Apply topically 2 times daily        DIPHENDRYL PO      Take 25 mg by mouth every 6 hours as needed for itching        glucose 4 G Chew chewable tablet     20 tablet    Take 2 every 15 minutes for blood sugar <70mg/dL. Recheck blood sugar every 15 minutes until above 70mg/dL, then eat a substantial meal.    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    Type 2 diabetes mellitus with mild nonproliferative retinopathy without macular edema, with long-term current use of insulin, unspecified laterality (H)       insulin glargine 100 UNIT/ML injection    LANTUS    3 mL    Inject 45 Units Subcutaneous At Bedtime    Type 2 diabetes mellitus with mild nonproliferative retinopathy without macular edema, with long-term current use of insulin, unspecified laterality (H)       melatonin 5 MG Caps     180 capsule    Take 2 capsules by mouth daily At dinnertime    Insomnia, unspecified type       metoprolol succinate 100 MG 24 hr tablet    TOPROL-XL    28 tablet    TAKE 1 TABLET (100MG) BY MOUTH DAILY    Coronary artery disease involving native heart with angina pectoris, unspecified vessel or lesion type (H)       NOVOFINE 30 30G X 8 MM   Generic drug:  insulin pen needle     100 each    USE 4 DAILY OR AS DIRECTED    Type 2  diabetes mellitus with mild nonproliferative retinopathy without macular edema, with long-term current use of insulin, unspecified laterality (H)       * order for DME     1 each    Hold Novolog if meals are refused.    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, with long-term current use of insulin, unspecified laterality (H)       paliperidone 6 MG 24 hr tablet    INVEGA    30 tablet    Take 1 tablet (6 mg) by mouth every morning    Schizoaffective disorder, bipolar type (H)       polyethylene glycol powder    MIRALAX/GLYCOLAX    527 g    TAKE 17 GRAMS (1 CAPFUL) BY MOUTH DAILY    Constipation, unspecified constipation type       prochlorperazine 5 MG tablet    COMPAZINE    90 tablet    Take 1 tablet (5 mg) by mouth every 6 hours as needed for nausea or vomiting    Nausea       ranitidine 300 MG tablet    ZANTAC    90 tablet    TAKE 1 TABLET (300MG) BY MOUTH AT BEDTIME    Gastroesophageal reflux disease without esophagitis       senna-docusate 8.6-50 MG per tablet    SENOKOT-S;PERICOLACE     Take 1 tablet by mouth 2 times daily as needed for constipation        SUMAtriptan 25 MG tablet    IMITREX    12 tablet    Take 1-2 tablets (25-50 mg) by mouth at onset of headache for migraine May repeat in 2 hours. Max 8 tablets/24 hours.    Migraine with aura and without status migrainosus, not intractable       TRULICITY 1.5 MG/0.5ML pen   Generic drug:  dulaglutide     2 mL    INJECT 1.5MG SUBCUTANEOUSLY EVERY SEVEN DAYS    Type 2 diabetes mellitus with mild nonproliferative retinopathy without macular edema, with long-term current use of insulin, unspecified laterality (H)       vitamin D3 57811 UNITS capsule    CHOLECALCIFEROL    4 capsule    TAKE 1 CAPSULE (50,000 UNITS)  BY MOUTH ONCE A WEEK    Vitamin D deficiency       * Notice:  This list has 4 medication(s) that are the same as other medications prescribed for you. Read the directions carefully, and ask your doctor or other care provider to review them with you.

## 2018-02-12 NOTE — MR AVS SNAPSHOT
After Visit Summary   2/12/2018    Nena Tang    MRN: [id number]           Patient Information     Date Of Birth          1960        Visit Information        Provider Department      2/12/2018 1:00 PM Rowena Haas APRN CNP [facility]        Today's Diagnoses     Type 2 diabetes mellitus with mild nonproliferative retinopathy without macular edema, with long-term current use of insulin, unspecified laterality (H)    -  1    Morbid obesity (H)        Migraine with aura and without status migrainosus, not intractable          Care Instructions    -Bariatric clinic referral.  -Labs today.   -Imitrex for migraine headaches.             Follow-ups after your visit        Additional Services     BARIATRIC ADULT REFERRAL       Your provider has referred you to: [facility]: [facility] [phone]. [email or web address]    Please be aware that coverage of these services is subject to the terms and limitations of your health insurance plan.  Call member services at your health plan with any benefit or coverage questions.      Please bring the following with you to your appointment:      (1) List of current medications   (2) This referral request   (3) Any documents/labs given to you for this referral                  Your next 10 appointments already scheduled     Feb 12, 2018  2:30 PM CST   RETURN RETINA with Tiburcio Chacon MD   [facility] Clinic ([facility])    [address]  [address]  [facility] [place] [address]   [phone]            Feb 20, 2018  3:00 PM CST   SHORT with Eugenia De Jesus [facility] [place] ([facility])    [address] [place] [id number] [phone]            Feb 20, 2018  3:30 PM CST    Return Visit with ART Arias CNP   [facility] ([facility])    [address]  [address]  [facility] [address]   [phone]            Feb 20, 2018  3:30 PM CST   Return Visit with Richardson Lopez [facility] ([facility])    [address]  [address]  [facility] [address]   [phone]            Mar 02, 2018 10:30 AM CST   Return Visit with Richardson Lopez, [facility] ([facility])    [address]  [address]  [facility] [address]   [phone]            Mar 09, 2018 11:30 AM CST   Return Visit with Kraig Pedersen MD   [facility] ([facility])    [address]  [facility] [address] [phone]              Who to contact     If you have questions or need follow up information about today's clinic visit or your schedule please contact [facility] directly at [phone].  Normal or non-critical lab and imaging results will be communicated to you by MyChart, letter or phone within 4 business days after the clinic has received the results. If you do not hear from us within 7 days, please contact the clinic through MyChart or phone. If you have a critical or abnormal lab result, we will notify you by phone as soon as possible.  Submit refill requests through [employer] or call your pharmacy and they will forward the refill request to us. Please allow 3 business days for your refill to be completed.          Additional Information About Your Visit        [facility] [employer] [facility] Information     [employer] gives you secure access to your electronic health record. If you see a primary care provider, you can also send messages to your care team and make appointments. If you have questions, please call your primary  care clinic.  If you do not have a primary care provider, please call [phone] and they will assist you.        Care EveryWhere ID     This is your Care EveryWhere ID. This could be used by other organizations to access your [facility] medical records  [id number]        Your Vitals Were     Pulse Temperature Respirations Last Period Pulse Oximetry BMI (Body Mass Index)    84 99.1  F (37.3  C) (Oral) 16 01/06/2015 97% 44.48 kg/m2       Blood Pressure from Last 3 Encounters:   02/12/18 110/62   02/09/18 161/84   01/23/18 130/80    Weight from Last 3 Encounters:   02/12/18 228 lb 8 oz (103.6 kg)   01/23/18 226 lb 8 oz (102.7 kg)   01/18/18 219 lb 4.8 oz (99.5 kg)              We Performed the Following     BARIATRIC ADULT REFERRAL     Hemoglobin A1c          Today's Medication Changes          These changes are accurate as of 2/12/18  2:00 PM.  If you have any questions, ask your nurse or doctor.               Start taking these medicines.        Dose/Directions    SUMAtriptan 25 MG tablet   Commonly known as:  IMITREX   Used for:  Migraine with aura and without status migrainosus, not intractable   Started by:  Rowena Haas, APRN CNP        Dose:  25-50 mg   Take 1-2 tablets (25-50 mg) by mouth at onset of headache for migraine May repeat in 2 hours. Max 8 tablets/24 hours.   Quantity:  12 tablet   Refills:  1         These medicines have changed or have updated prescriptions.        Dose/Directions    insulin glargine 100 UNIT/ML injection   Commonly known as:  LANTUS   This may have changed:  additional instructions   Used for:  Type 2 diabetes mellitus with mild nonproliferative retinopathy without macular edema, with long-term current use of insulin, unspecified laterality (H)        Dose:  45 Units   Inject 45 Units Subcutaneous At Bedtime   Quantity:  3 mL   Refills:  11            Where to get your medicines      These medications were sent to [place], [place] - [address] [place] [address]  [address] [address]     Phone:  [phone]     SUMAtriptan 25 MG tablet                Primary Care Provider Office Phone # Fax #    ART Arias [phone]       [address] [facility] [address]  [facility] [address]        Goals        General    Medical (pt-stated)     Notes - Note created  7/7/2016  9:15 AM by Chelsea Pulido RN    Get blood sugars under control    Improve medication compliance    As of today's date 7/7/2016 goal is met at 0 - 25%.   Goal Status:  Active          Equal Access to Services     [facility]: Hadii aad ku hadasho Soomaali, waaxda luqadaha, qaybta kaalmada adeegyada, waxay idiin hayaan ademonica ellison . So [facility] [phone].    ATENCIÓN: Si habla español, tiene a trinh disposición servicios gratuitos de asistencia lingüística. Llame al [phone].    We comply with applicable federal civil rights laws and [place] laws. We do not discriminate on the basis of race, color, national origin, age, disability, sex, sexual orientation, or gender identity.            Thank you!     Thank you for choosing [facility]  for your care. Our goal is always to provide you with excellent care. Hearing back from our patients is one way we can continue to improve our services. Please take a few minutes to complete the written survey that you may receive in the mail after your visit with us. Thank you!             Your Updated Medication List - Protect others around you: Learn how to safely use, store and throw away your medicines at www.disposemymeds.org.          This list is accurate as of 2/12/18  2:00 PM.  Always use your most recent med list.                   Brand Name Dispense Instructions for use Diagnosis    * ACETAMINOPHEN PO      Take 1,000 mg by mouth every 6 hours as needed for pain or fever        * acetaminophen 500 MG tablet    TYLENOL    100 tablet    Take 2 tablets (1,000 mg) by mouth 3  times daily as needed for mild pain    Chronic low back pain, unspecified back pain laterality, with sciatica presence unspecified       albuterol 108 (90 BASE) MCG/ACT Inhaler    PROAIR HFA/PROVENTIL HFA/VENTOLIN HFA    3 Inhaler    Inhale 2 puffs into the lungs every 6 hours as needed for shortness of breath / dyspnea or wheezing    Dyspnea on exertion       amantadine 100 MG capsule    SYMMETREL    60 capsule    Take 1 capsule (100 mg) by mouth 2 times daily    Schizoaffective disorder, depressive type (H), Tardive dyskinesia       aspirin 81 MG EC tablet    ASPIRIN LOW DOSE    100 tablet    Take 1 tablet (81 mg) by mouth daily    Coronary artery disease involving native heart with angina pectoris, unspecified vessel or lesion type (H)       atorvastatin 80 MG tablet    LIPITOR    28 tablet    TAKE 1 TABLET (80MG) BY MOUTH DAILY    Hyperlipidemia LDL goal <100       blood glucose monitoring lancets     150 each    Use to test blood sugar 2 times daily or as directed.  Ok to substitute alternative if insurance prefers.    Type 2 diabetes mellitus with diabetic neuropathy, with long-term current use of insulin (H)       blood glucose monitoring test strip    ONETOUCH VERIO IQ    200 strip    Use to test blood sugar 4 times daily .  Ok to substitute alternative if insurance prefers.    Type 2 diabetes mellitus with diabetic neuropathy, with long-term current use of insulin (H)       clotrimazole 1 % cream    LOTRIMIN     Apply topically 2 times daily        DIPHENDRYL PO      Take 25 mg by mouth every 6 hours as needed for itching        gabapentin 300 MG capsule    NEURONTIN    168 capsule    TAKE 2 CAPSULES (600MG) BY MOUTH THREE TIMES A DAY    Type 2 diabetes mellitus with diabetic neuropathy, with long-term current use of insulin (H)       glucose 4 G Chew chewable tablet     20 tablet    Take 2 every 15 minutes for blood sugar <70mg/dL. Recheck blood sugar every 15 minutes until above 70mg/dL, then eat a  substantial meal.    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    Type 2 diabetes mellitus with mild nonproliferative retinopathy without macular edema, with long-term current use of insulin, unspecified laterality (H)       insulin glargine 100 UNIT/ML injection    LANTUS    3 mL    Inject 45 Units Subcutaneous At Bedtime    Type 2 diabetes mellitus with mild nonproliferative retinopathy without macular edema, with long-term current use of insulin, unspecified laterality (H)       losartan 100 MG tablet    COZAAR    28 tablet    TAKE 1 TABLET (100MG) BY MOUTH DAILY    Coronary artery disease involving native heart with angina pectoris, unspecified vessel or lesion type (H)       melatonin 5 MG Caps     180 capsule    Take 2 capsules by mouth daily At dinnertime    Insomnia, unspecified type       metoprolol succinate 100 MG 24 hr tablet    TOPROL-XL    28 tablet    TAKE 1 TABLET (100MG) BY MOUTH DAILY    Coronary artery disease involving native heart with angina pectoris, unspecified vessel or lesion type (H)       NOVOFINE 30 30G X 8 MM   Generic drug:  insulin pen needle     100 each    USE 4 DAILY OR AS DIRECTED    Type 2 diabetes mellitus with mild nonproliferative retinopathy without macular edema, with long-term current use of insulin, unspecified laterality (H)       * order for DME     1 each    Hold Novolog if meals are refused.    Type 2 diabetes mellitus with mild nonproliferative retinopathy without macular  edema, with long-term current use of insulin, unspecified laterality (H)       * order for DME     2 each    Diabetic Shoes    Type 2 diabetes mellitus with mild nonproliferative retinopathy without macular edema, with long-term current use of insulin, unspecified laterality (H)       paliperidone 6 MG 24 hr tablet    INVEGA    30 tablet    Take 1 tablet (6 mg) by mouth every morning    Schizoaffective disorder, bipolar type (H)       polyethylene glycol powder    MIRALAX/GLYCOLAX    527 g    TAKE 17 GRAMS (1 CAPFUL) BY MOUTH DAILY    Constipation, unspecified constipation type       prochlorperazine 5 MG tablet    COMPAZINE    90 tablet    Take 1 tablet (5 mg) by mouth every 6 hours as needed for nausea or vomiting    Nausea       ranitidine 300 MG tablet    ZANTAC    90 tablet    TAKE 1 TABLET (300MG) BY MOUTH AT BEDTIME    Gastroesophageal reflux disease without esophagitis       senna-docusate 8.6-50 MG per tablet    SENOKOT-S;PERICOLACE     Take 1 tablet by mouth 2 times daily as needed for constipation        SUMAtriptan 25 MG tablet    IMITREX    12 tablet    Take 1-2 tablets (25-50 mg) by mouth at onset of headache for migraine May repeat in 2 hours. Max 8 tablets/24 hours.    Migraine with aura and without status migrainosus, not intractable       TRULICITY 1.5 MG/0.5ML pen   Generic drug:  dulaglutide     2 mL    INJECT 1.5MG SUBCUTANEOUSLY EVERY SEVEN DAYS    Type 2 diabetes mellitus with mild nonproliferative retinopathy without macular edema, with long-term current use of insulin, unspecified laterality (H)       vitamin D3 87031 UNITS capsule    CHOLECALCIFEROL    4 capsule    TAKE 1 CAPSULE (50,000 UNITS) BY MOUTH ONCE A WEEK    Vitamin D deficiency       * Notice:  This list has 4 medication(s) that are the same as other medications prescribed for you. Read the directions carefully, and ask your doctor or other care provider to review them with you.

## 2018-02-12 NOTE — MR AVS SNAPSHOT
After Visit Summary   2/12/2018    Nena Tang    MRN: [id number]           Patient Information     Date Of Birth          1960        Visit Information        Provider Department      2/12/2018 2:30 PM [facility]cio [facility]        Today's Diagnoses     Type 2 diabetes mellitus with both eyes affected by mild nonproliferative retinopathy without macular edema, with long-term current use of insulin (H) - Both Eyes    -  1    Pseudophakia, both eyes           Follow-ups after your visit        Follow-up notes from your care team     Return in about 2 months (around 4/12/2018) for exam oct.      Your next 10 appointments already scheduled     Mar 02, 2018 10:30 AM CST   Return Visit with BIN Mondragon   [facility] ([facility])    [address]  [address]  [facility] [address]   [phone]            Mar 09, 2018 11:30 AM CST   Return Visit with Kraig Pedersen MD   [facility] ([facility])    [address]  [facility] [address]   [phone]            Mar 13, 2018 10:00 AM CDT   Return Visit with ART Arias [facility] ([facility])    [address]  [address] [place] [address]   [phone]            Mar 13, 2018 10:00 AM CDT   SHORT with Eugenia De Jesus [facility] [place] ([facility])    [address] [place] [address]  [address] [place] [address]   [phone]            Mar 13, 2018 10:00 AM CDT   Return Visit with BIN Mondragon   [facility] ([facility])    [address]  [address]  [facility] [address]   [phone]            Apr 12, 2018 12:30 PM CDT   RETURN RETINA with Tiburcio  Mercedez Chacon MD   [facility] ([facility])    [address]  [address] [address] [phone]              Future tests that were ordered for you today     Open Future Orders        Priority Expected Expires Ordered    OCT Retina Spectralis OU (both eyes) Routine  8/16/2019 2/12/2018            Who to contact     Please call your clinic at [phone] to:    Ask questions about your health    Make or cancel appointments    Discuss your medicines    Learn about your test results    Speak to your doctor            Additional Information About Your Visit        [employer] Information     [employer] gives you secure access to your electronic health record. If you see a primary care provider, you can also send messages to your care team and make appointments. If you have questions, please call your primary care clinic.  If you do not have a primary care provider, please call [phone] and they will assist you.      [employer] is an electronic gateway that provides easy, online access to your medical records. With [employer], you can request a clinic appointment, read your test results, renew a prescription or communicate with your care team.     To access your existing account, please contact your [facility] or call [phone] for assistance.        Care EveryWhere ID     This is your Care EveryWhere ID. This could be used by other organizations to access your [facility] medical records  [id number]        Your Vitals Were     Last Period                   01/06/2015            Blood Pressure from Last 3 Encounters:   02/12/18 110/62   02/09/18 161/84   01/23/18 130/80    Weight from Last 3 Encounters:   02/12/18 103.6 kg (228 lb 8 oz)   01/23/18 102.7 kg (226 lb 8 oz)   01/18/18 99.5 kg (219 lb 4.8 oz)              We Performed the Following     OCT Retina Spectralis OU (both eyes)          Today's Medication Changes           These changes are accurate as of 2/12/18  3:40 PM.  If you have any questions, ask your nurse or doctor.               Start taking these medicines.        Dose/Directions    SUMAtriptan 25 MG tablet   Commonly known as:  IMITREX   Used for:  Migraine with aura and without status migrainosus, not intractable   Started by:  Rowena Haas APRN CNP        Dose:  25-50 mg   Take 1-2 tablets (25-50 mg) by mouth at onset of headache for migraine May repeat in 2 hours. Max 8 tablets/24 hours.   Quantity:  12 tablet   Refills:  1         These medicines have changed or have updated prescriptions.        Dose/Directions    insulin glargine 100 UNIT/ML injection   Commonly known as:  LANTUS   This may have changed:  additional instructions   Used for:  Type 2 diabetes mellitus with mild nonproliferative retinopathy without macular edema, with long-term current use of insulin, unspecified laterality (H)        Dose:  45 Units   Inject 45 Units Subcutaneous At Bedtime   Quantity:  3 mL   Refills:  11            Where to get your medicines      These medications were sent to [address] [address]     Phone:  [phone]     SUMAtriptan 25 MG tablet                Primary Care Provider Office Phone # Fax #    ART Arias [phone]       [address] [facility] [address]  [facility] [address]        Goals        General    Medical (pt-stated)     Notes - Note created  7/7/2016  9:15 AM by Chelsea Pulido RN    Get blood sugars under control    Improve medication compliance    As of today's date 7/7/2016 goal is met at 0 - 25%.   Goal Status:  Active          Equal Access to Services     [facility]: Azul Rios, luis napier, lorena galvin. So [facility] [phone].    ATENCIÓN: Si habla español, tiene a trinh disposición servicios  jefferson de asistencia lingüística. Smith zacarias [phone].    We comply with applicable federal civil rights laws and [place] laws. We do not discriminate on the basis of race, color, national origin, age, disability, sex, sexual orientation, or gender identity.            Thank you!     Thank you for choosing [facility]  for your care. Our goal is always to provide you with excellent care. Hearing back from our patients is one way we can continue to improve our services. Please take a few minutes to complete the written survey that you may receive in the mail after your visit with us. Thank you!             Your Updated Medication List - Protect others around you: Learn how to safely use, store and throw away your medicines at www.disposemymeds.org.          This list is accurate as of 2/12/18  3:40 PM.  Always use your most recent med list.                   Brand Name Dispense Instructions for use Diagnosis    * ACETAMINOPHEN PO      Take 1,000 mg by mouth every 6 hours as needed for pain or fever        * acetaminophen 500 MG tablet    TYLENOL    100 tablet    Take 2 tablets (1,000 mg) by mouth 3 times daily as needed for mild pain    Chronic low back pain, unspecified back pain laterality, with sciatica presence unspecified       albuterol 108 (90 BASE) MCG/ACT Inhaler    PROAIR HFA/PROVENTIL HFA/VENTOLIN HFA    3 Inhaler    Inhale 2 puffs into the lungs every 6 hours as needed for shortness of breath / dyspnea or wheezing    Dyspnea on exertion       amantadine 100 MG capsule    SYMMETREL    60 capsule    Take 1 capsule (100 mg) by mouth 2 times daily    Schizoaffective disorder, depressive type (H), Tardive dyskinesia       aspirin 81 MG EC tablet    ASPIRIN LOW DOSE    100 tablet    Take 1 tablet (81 mg) by mouth daily    Coronary artery disease involving native heart with angina pectoris, unspecified vessel or lesion type (H)       atorvastatin 80 MG tablet    LIPITOR    28 tablet    TAKE 1 TABLET (80MG)  BY MOUTH DAILY    Hyperlipidemia LDL goal <100       blood glucose monitoring lancets     150 each    Use to test blood sugar 2 times daily or as directed.  Ok to substitute alternative if insurance prefers.    Type 2 diabetes mellitus with diabetic neuropathy, with long-term current use of insulin (H)       blood glucose monitoring test strip    ONETOUCH VERIO IQ    200 strip    Use to test blood sugar 4 times daily .  Ok to substitute alternative if insurance prefers.    Type 2 diabetes mellitus with diabetic neuropathy, with long-term current use of insulin (H)       clotrimazole 1 % cream    LOTRIMIN     Apply topically 2 times daily        DIPHENDRYL PO      Take 25 mg by mouth every 6 hours as needed for itching        gabapentin 300 MG capsule    NEURONTIN    168 capsule    TAKE 2 CAPSULES (600MG) BY MOUTH THREE TIMES A DAY    Type 2 diabetes mellitus with diabetic neuropathy, with long-term current use of insulin (H)       glucose 4 G Chew chewable tablet     20 tablet    Take 2 every 15 minutes for blood sugar <70mg/dL. Recheck blood sugar every 15 minutes until above 70mg/dL, then eat a substantial meal.    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, with long-term current use of insulin, unspecified laterality (H)       insulin glargine 100 UNIT/ML injection    LANTUS    3 mL    Inject 45 Units Subcutaneous At Bedtime    Type 2  diabetes mellitus with mild nonproliferative retinopathy without macular edema, with long-term current use of insulin, unspecified laterality (H)       losartan 100 MG tablet    COZAAR    28 tablet    TAKE 1 TABLET (100MG) BY MOUTH DAILY    Coronary artery disease involving native heart with angina pectoris, unspecified vessel or lesion type (H)       melatonin 5 MG Caps     180 capsule    Take 2 capsules by mouth daily At dinnertime    Insomnia, unspecified type       metoprolol succinate 100 MG 24 hr tablet    TOPROL-XL    28 tablet    TAKE 1 TABLET (100MG) BY MOUTH DAILY    Coronary artery disease involving native heart with angina pectoris, unspecified vessel or lesion type (H)       NOVOFINE 30 30G X 8 MM   Generic drug:  insulin pen needle     100 each    USE 4 DAILY OR AS DIRECTED    Type 2 diabetes mellitus with mild nonproliferative retinopathy without macular edema, with long-term current use of insulin, unspecified laterality (H)       * order for DME     1 each    Hold Novolog if meals are refused.    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, with long-term current use of insulin, unspecified laterality (H)       paliperidone 6 MG 24 hr tablet    INVEGA    30 tablet    Take 1 tablet (6 mg) by mouth every morning    Schizoaffective disorder, bipolar type (H)       polyethylene glycol powder    MIRALAX/GLYCOLAX    527 g    TAKE 17 GRAMS (1 CAPFUL) BY MOUTH DAILY    Constipation, unspecified constipation type       prochlorperazine 5 MG tablet    COMPAZINE    90 tablet    Take 1 tablet (5 mg) by mouth every 6 hours as needed for nausea or vomiting    Nausea       ranitidine 300 MG tablet    ZANTAC    90 tablet    TAKE 1 TABLET (300MG) BY MOUTH AT BEDTIME     Gastroesophageal reflux disease without esophagitis       senna-docusate 8.6-50 MG per tablet    SENOKOT-S;PERICOLACE     Take 1 tablet by mouth 2 times daily as needed for constipation        SUMAtriptan 25 MG tablet    IMITREX    12 tablet    Take 1-2 tablets (25-50 mg) by mouth at onset of headache for migraine May repeat in 2 hours. Max 8 tablets/24 hours.    Migraine with aura and without status migrainosus, not intractable       TRULICITY 1.5 MG/0.5ML pen   Generic drug:  dulaglutide     2 mL    INJECT 1.5MG SUBCUTANEOUSLY EVERY SEVEN DAYS    Type 2 diabetes mellitus with mild nonproliferative retinopathy without macular edema, with long-term current use of insulin, unspecified laterality (H)       vitamin D3 51599 UNITS capsule    CHOLECALCIFEROL    4 capsule    TAKE 1 CAPSULE (50,000 UNITS) BY MOUTH ONCE A WEEK    Vitamin D deficiency       * Notice:  This list has 4 medication(s) that are the same as other medications prescribed for you. Read the directions carefully, and ask your doctor or other care provider to review them with you.

## 2018-02-12 NOTE — NURSING NOTE
Chief Complaints and History of Present Illnesses   Patient presents with     Follow Up For     2 month follow up DM     HPI    Symptoms:     Floaters   Flashes (Comment: had flashes last PM BE)   No glare   No halos      Duration:  2 months      Do you have eye pain now?:  No      Comments:  2 month follow up  Vision a little better  Blood sugar 131 this AM, A1C 7,3 months ago. Took today haven't gotten reading  Refresh qd BE  Kylah CEDENO 2:53 PM February 12, 2018

## 2018-02-13 ENCOUNTER — TELEPHONE (OUTPATIENT)
Dept: FAMILY MEDICINE | Facility: CLINIC | Age: 58
End: 2018-02-13

## 2018-02-13 DIAGNOSIS — F25.1 SCHIZOAFFECTIVE DISORDER, DEPRESSIVE TYPE (H): ICD-10-CM

## 2018-02-13 DIAGNOSIS — I25.119 CORONARY ARTERY DISEASE INVOLVING NATIVE HEART WITH ANGINA PECTORIS, UNSPECIFIED VESSEL OR LESION TYPE (H): ICD-10-CM

## 2018-02-13 DIAGNOSIS — Z79.4 TYPE 2 DIABETES MELLITUS WITH DIABETIC NEUROPATHY, WITH LONG-TERM CURRENT USE OF INSULIN (H): ICD-10-CM

## 2018-02-13 DIAGNOSIS — G24.01 TARDIVE DYSKINESIA: ICD-10-CM

## 2018-02-13 DIAGNOSIS — E11.40 TYPE 2 DIABETES MELLITUS WITH DIABETIC NEUROPATHY, WITH LONG-TERM CURRENT USE OF INSULIN (H): ICD-10-CM

## 2018-02-13 RX ORDER — GABAPENTIN 300 MG/1
CAPSULE ORAL
Qty: 168 CAPSULE | Refills: 3 | Status: SHIPPED | OUTPATIENT
Start: 2018-02-13 | End: 2018-06-06

## 2018-02-13 RX ORDER — LOSARTAN POTASSIUM 100 MG/1
TABLET ORAL
Qty: 28 TABLET | Refills: 3 | Status: SHIPPED | OUTPATIENT
Start: 2018-02-13 | End: 2018-06-06

## 2018-02-13 NOTE — TELEPHONE ENCOUNTER
Last Written Prescription Date:  10/19/17  Last Fill Quantity: 28,  # refills: 3   Last office visit: 2/12/2018 with prescribing provider:  Rowena Mejia Office Visit:   Next 5 appointments (look out 90 days)     Mar 02, 2018 10:30 AM CST   Return Visit with Richardson Lopez [facility] ([facility])    [address]  [address]  [facility] [address]   [phone]            Mar 09, 2018 11:30 AM CST   Return Visit with Kraig Pedersen MD   [facility] ([facility])    [address]  [facility] [address] [phone]            Mar 13, 2018 10:00 AM CDT   Return Visit with ART Arias [facility] ([facility])    [address]  [address]  [facility] [address]   [phone]            Mar 13, 2018 10:00 AM CDT   SHORT with Eugenia De Jesus [facility] ([facility])    [address] [facility]  [address]  [facility] [address]   [phone]            Mar 13, 2018 10:00 AM CDT   Return Visit with Richardson Lopez, [facility] ([facility])    [address]  [address]  [facility] [address]   [phone]                    Last Written Prescription Date:  10/16/17  Last Fill Quantity: 168,  # refills: 3   Last office visit: 2/12/2018 with prescribing provider:  Rowena Mejia Office Visit:   Next 5 appointments (look out 90 days)     Mar 02, 2018 10:30 AM CST   Return Visit with Richardson Lopez [facility] ([facility])    [address]  [address]  [facility] [address]   [phone]            Mar 09, 2018 11:30 AM CST   Return Visit with Kraig Pedersen MD  "  [facility] ([facility])    [address]  [facility] [address]   [phone]            Mar 13, 2018 10:00 AM CDT   Return Visit with ART Arias CNP   [facility] ([facility])    [address]  [address]  [facility] [address]   [phone]            Mar 13, 2018 10:00 AM CDT   SHORT with Eugenia De Jesus [facility] ([facility])    [address]  [address]  [facility] [address]   [phone]            Mar 13, 2018 10:00 AM CDT   Return Visit with Richardson Lopez [facility] ([facility])    [address]  [address]  [facility] [address]   [phone]                   Requested Prescriptions   Pending Prescriptions Disp Refills     losartan (COZAAR) 100 MG tablet 28 tablet 3    Angiotensin-II Receptors Failed    2/13/2018  2:21 PM       Failed - Normal serum creatinine on file in past 12 months    Recent Labs   Lab Test  02/08/18   2155   CR  1.09*            Passed - Blood pressure under 140/90 in past 12 months.    BP Readings from Last 3 Encounters:   02/12/18 110/62   02/09/18 161/84   01/23/18 130/80                Passed - Recent or future visit with authorizing provider's specialty    Patient had office visit in the last year or has a visit in the next 30 days with authorizing provider.  See \"Patient Info\" tab in inbasket, or \"Choose Columns\" in Meds & Orders section of the refill encounter.            Passed - Patient is age 18 or older       Passed - No active pregnancy on record       Passed - Normal serum potassium on file in past 12 months    Recent Labs   Lab Test  02/08/18   2155   POTASSIUM  4.3                   Passed - No positive pregnancy test in past 12 months        gabapentin " (NEURONTIN) 300 MG capsule 168 capsule 3     Sig: TAKE 2 CAPSULES (600MG) BY MOUTH THREE TIMES A DAY    There is no refill protocol information for this order

## 2018-02-13 NOTE — TELEPHONE ENCOUNTER
Incoming call from Kalie, staff at pt's group home, stating that pt was prescribed amantadine (SYMMETREL) 100 MG upon discharge from the hospital. Group home staff would like to know if pt should continue taking it.     Please follow up with [facility]. Contact info: [phone]    Val Valenzuela

## 2018-02-13 NOTE — TELEPHONE ENCOUNTER
Medication can be discontinued as long as patient is not still having the movements that she was having when she was on Haldol.   ART Oliver CNP

## 2018-02-13 NOTE — TELEPHONE ENCOUNTER
gabapentin (NEURONTIN) 300 MG capsule  Controlled Substance Refill Request     Last refill: 1/1/18,  168 tabs, 28 days supply    Last clinic visit: 2/12/18     Next appt: 3/13/18    Controlled substance agreement on file: No.    Documentation in problem list reviewed:  Yes    Processing:  [place]    [place] reviewed:  reviewed- no concerns  [place]:  [email or web address]      Cozaar  Routing refill request to provider for review/approval because:  Labs out of range:  Creatinine      Indra Le RN

## 2018-02-14 RX ORDER — AMANTADINE HYDROCHLORIDE 100 MG/1
100 CAPSULE, GELATIN COATED ORAL 2 TIMES DAILY
Qty: 60 CAPSULE | Refills: 0 | Status: CANCELLED | OUTPATIENT
Start: 2018-02-14

## 2018-02-14 NOTE — TELEPHONE ENCOUNTER
Writer received a call back from Pt's group home.  Group home stated that they can't make clinical judgement regarding Pt's movements.  This would have to be PCP decision.      They originally reached out because pharmacy is needing a renewal for this medication.      If medication is to be D/C they need written order to D/C.  Fax is [phone].    Pt's next Appt with PCP is on 3/13/18.    Sorry, I closed note after first call.    Indra Le RN

## 2018-02-20 NOTE — TELEPHONE ENCOUNTER
Fax received from pt's group home stating that Amantadine order needs to be re-ordered with [place] pharmacy or discontinued with the phar. Pt will be taking last dose of med on 2/27.  [facility] home ([facility]) [phone]      Giorgi Case

## 2018-03-07 ENCOUNTER — TELEPHONE (OUTPATIENT)
Dept: FAMILY MEDICINE | Facility: CLINIC | Age: 58
End: 2018-03-07

## 2018-03-07 NOTE — TELEPHONE ENCOUNTER
Reason for Call:  Novolog 20 units before breakfast     Detailed comments: [facility] (pt's group [facility]) called today pt miss her insulin dose. [facility] need to know should pt receive dose with lunch.    Phone Number Patient can be reached at: [facility]  [phone]    Best Time: anytime    Can we leave a detailed message on this number? Not Applicable    Call taken on 3/7/2018 at 10:43 AM by Giorgi Case

## 2018-03-07 NOTE — TELEPHONE ENCOUNTER
"Returned call to group home and advised to just give regular dose of 20 units prior to lunch and do not try and \"make up\" breakfast dose.  Rina Norris RN     "

## 2018-03-09 ENCOUNTER — OFFICE VISIT (OUTPATIENT)
Dept: PSYCHIATRY | Facility: CLINIC | Age: 58
End: 2018-03-09
Payer: MEDICARE

## 2018-03-09 DIAGNOSIS — G25.9 EXTRAPYRAMIDAL AND MOVEMENT DISORDER: Primary | ICD-10-CM

## 2018-03-09 DIAGNOSIS — F25.0 SCHIZOAFFECTIVE DISORDER, BIPOLAR TYPE (H): ICD-10-CM

## 2018-03-09 PROCEDURE — 90833 PSYTX W PT W E/M 30 MIN: CPT | Performed by: PSYCHIATRY & NEUROLOGY

## 2018-03-09 PROCEDURE — 99214 OFFICE O/P EST MOD 30 MIN: CPT | Performed by: PSYCHIATRY & NEUROLOGY

## 2018-03-09 RX ORDER — TRIHEXYPHENIDYL HYDROCHLORIDE 2 MG/1
2 TABLET ORAL DAILY
Qty: 30 TABLET | Refills: 0 | Status: SHIPPED | OUTPATIENT
Start: 2018-03-09 | End: 2018-03-23

## 2018-03-09 RX ORDER — PALIPERIDONE 6 MG/1
6 TABLET, EXTENDED RELEASE ORAL EVERY MORNING
Qty: 30 TABLET | Refills: 0 | Status: SHIPPED | OUTPATIENT
Start: 2018-03-09 | End: 2018-03-12

## 2018-03-09 NOTE — MR AVS SNAPSHOT
MRN:[id number]                      After Visit Summary   3/9/2018    Nena Tang    MRN: [id number]           Visit Information        Provider Department      3/9/2018 11:30 AM Kraig Pedersen MD [facility]        Your next 10 appointments already scheduled     Mar 13, 2018 10:00 AM CDT   Return Visit with ART Arias CNP   [facility] ([facility])    [address]  [address] [place] [id number] [phone]            Mar 13, 2018 10:00 AM CDT   SHORT with Eugenia De Jesus [facility] MTM ([facility])    [address] [place] [address]  [address] [place] [phone]            Mar 13, 2018 10:00 AM CDT   Return Visit with Richardson Lopez [facility] ([facility])    [address]  [address] [place] [phone]              Care Instructions    - Increase Invega to 9mg by mouth once daily for the voices.  - Start Trihexyphenidyl 2mg by mouth once daily for extrapyramidal side effects.      24/7 Crisis Hotlines:    National Suicide Prevention Hotline   796-986-ZXKZ (7243)    Crisis Hotlines by County:    [place] Crisis Line     [phone]  [place] Crisis Line   [phone]  [place] Crisis Line     [phone]  [place] COPE for Adults   [phone]  [place] for Children    [phone]  [place] for Adults    [phone]  [place] for Children    [phone]  [facility] Crisis Line    [phone]    [facility] Crisis Response Team  [phone] ([phone])  [facility] Crisis is available 24 hours a day. The team provides callers with a needs assessment and referrals to appropriate resources. If medically necessary, the Crisis Response Team assists individuals by traveling  to their location to provide face-to-face interventions and assessments. Crisis services are available for adults and children in [facility] and [facility]. Adult Crisis beds are also available if appropriate.    Walk-In Centers and Mobile Teams  [facility] Walk-In Crisis Intervention  [phone]  [facility] COPE (18+) Crisis Mobile Team    [phone]  [facility] Child (under 17) Crisis Mobile Team [phone]  [facility] Walk-In & Mobile Teams [phone]    Additional Crisis Hotlines:  Bridge for Youth      567.644.7550  Crisis Connection      [phone]  [facility] ([facility] Crisis Services)  [phone]  [facility] Social Service (S)    [phone]  Men s Crisis Line      877-888-YIVZ ([phone])  National Suicide Prevention Hotline   291-035-MRHY (2689)  [facility] Crisis Line    [phone]  Suicide Hotline      471.508.5138  [facility] (formerly First Call for Help)  Dial 2-1-3 (403-579-8048)    Domestic Violence, Rape and Sexual Abuse Hotlines:  [place] Crisis Line     287.946.3595  [place] Helpline  [phone]  Domestic Abuse Project (DAP)    1-457.169.2288*  [facility] Crisis Line     [phone]  [place] [facility]attered [facility]  9-607-847-2953*  [place] [facility]       [phone] ([phone]*)  National Domestic Violence Hotline   3-787-524-UHKQ  Rape/Sexual Abuse Center Crisis Line    [phone]   Sexual Violence Center (SVC)    [phone]  [facility] [employer]     [phone] ([phone]*)           MyChart Information     [facility] gives you secure access to your electronic health record. If you see a primary care provider, you can also send messages to your care team and make appointments. If you have questions, please call your primary care clinic.  If you do not have a primary care provider, please call [phone] and they will  assist you.        Care EveryWhere ID     This is your Care EveryWhere ID. This could be used by other organizations to access your [place] medical records  [id number]        Equal Access to Services     RAMESH GARRIDO : Azul Rios, luis napier, devante catalan, lorena martins. So [facility] [phone].    ATENCIÓN: Si habla español, tiene a trinh disposición servicios gratuitos de asistencia lingüística. Llame al [phone].    We comply with applicable federal civil rights laws and [place] laws. We do not discriminate on the basis of race, color, national origin, age, disability, sex, sexual orientation, or gender identity.

## 2018-03-09 NOTE — PROGRESS NOTES
Psychiatry Clinic Progress Note                                                                   Nena Tang is a 58 year old female who was referred by her primary care provider for treatment and evaluation of depression and psychosis  Therapist: N/A  PCP: Rowena Haas  Other Providers: N/A     History was provided by patient and care taker Zheng Barnes (tel # [phone]) who was a limited historian.    Pertinent Background:  See section specific documentation.  Psych critical item history includes suicidal ideation, psychosis [sxs include hallucinations], mutiple psychotropic trials, psych hosp (>5) and SUBSTANCE USE: cocaine and alcohol   Interim History                                                                                                        4, 4     The patient is a fair historian.  At her last visit I had asked her to stop Haldol and had started her on Invega 6mg po daily. She reported that this change has helped stop her leg from constantly shaking but that she has noticed symptoms of her lower jaw and tongue hanging low. She also reported some drooling. She is still experiencing auditory hallucinations of voices telling her to kill herself although stated that they have decreased in intensity. She stated that at times she sees a shadow of a man in her room and feels that this is also diminishing. She was recently visiting [place] to see her sister who is undergoing chemotherapy for lung cancer. She was tearful when discussing this.    Recent Symptoms:   Psychosis:  auditory hallucinations and visual hallucinations  ADVERSE EFFECTS: lower jaw and tongue drooping  MEDICAL CONCERNS: blurry vision likely secondary to Diabetes (see recent Opthalmology note)     Recent Substance Use:  none reported        Social/ Family History                                  [per patient report]                                 1ea,1ea   he is currently on SSDI and lives in an Adult Foster Care  Facility. She is  for the last 3-4 years and was  about 12 years. She has two adult sons. She reported no history of abuse in her life.    Medical / Surgical History                                                                                                                  Patient Active Problem List   Diagnosis     Osteopenia     Vitamin B12 deficiency without anemia     Hyperlipidemia LDL goal <100     Rotator cuff syndrome     Type 2 diabetes mellitus with mild nonproliferative retinopathy (H)     Illiterate     Irritable bowel syndrome     overweight - BMI >35     Takotsubo cardiomyopathy     CAD (coronary artery disease)     Restless legs syndrome (RLS)     CINDY (obstructive sleep apnea)- mild AHI 10.3     Verbal auditory hallucination     Chronic low back pain     Schizoaffective disorder, depressive type (H)     Migraine headache     HTN, goal below 140/90     Health Care Home     Lumbago     Cervicalgia     Cocaine abuse, episodic     Suicidal ideation     Esophageal reflux     Mild nonproliferative diabetic retinopathy (H)     Tardive dyskinesia     Alcohol use     Left cataract     Falls frequently     History of uterine cancer     Psychophysiological insomnia     Dysuria     Asymptomatic postmenopausal status     Abdominal pain, right lower quadrant     Sepsis (H)     Pneumonia of right lower lobe due to infectious organism (H)     Infectious encephalopathy     Non-intractable vomiting with nausea     Acute respiratory failure with hypoxia (H)     Thoughts of self harm     Gastroenteritis     Posttraumatic stress disorder       Past Surgical History:   Procedure Laterality Date     C OOPHORECTORMY FOR RAHEL, W/BX  1983    UTERINE     CATARACT IOL, RT/LT Bilateral 2017     COLONOSCOPY N/A 3/16/2017    Procedure: COLONOSCOPY;  Surgeon: Traci Gonzalez MD;  Location:  GI     Coronary CTA  5/21/2014     HYSTERECTOMY  1983    uterine cancer yearly pap's per provider.      LAPAROSCOPIC CHOLECYSTECTOMY  2008     PHACOEMULSIFICATION CLEAR CORNEA WITH STANDARD INTRAOCULAR LENS IMPLANT Left 5/5/2017    Procedure: PHACOEMULSIFICATION CLEAR CORNEA WITH STANDARD INTRAOCULAR LENS IMPLANT;  LEFT EYE PHACOEMULSIFICATION CLEAR CORNEA WITH STANDARD INTRAOCULAR LENS IMPLANT ;  Surgeon: Tyra Diaz MD;  Location:  EC     PHACOEMULSIFICATION CLEAR CORNEA WITH STANDARD INTRAOCULAR LENS IMPLANT Right 6/30/2017    Procedure: PHACOEMULSIFICATION CLEAR CORNEA WITH STANDARD INTRAOCULAR LENS IMPLANT;  RIGHT EYE PHACOEMULSIFICATION CLEAR CORNEA WITH STANDARD INTRAOCULAR LENS IMPLANT;  Surgeon: Tyra Diaz MD;  Location:  EC     RELEASE TRIGGER FINGER  10/11/2012    Left thumb. Procedure: RELEASE TRIGGER FINGER;  LEFT THUMB TRIGGER RELEASE;  Surgeon: Tay Langley MD;  Location:  SD     RELEASE TRIGGER FINGER Right 12/26/2016    Procedure: RELEASE TRIGGER FINGER;  Surgeon: Albino Castañeda MD;  Location:  OR        Medical Review of Systems                                                                                                    2,10   A comprehensive review of systems was performed and is negative other than noted in the HPI.  Allergy                                Imidazole antifungals; Ketoprofen; Lisinopril; Metformin; Metronidazole; and Posaconazole  Current Medications                                                                                                       Current Outpatient Prescriptions   Medication Sig Dispense Refill     paliperidone (INVEGA) 6 MG 24 hr tablet Take 1 tablet (6 mg) by mouth every morning 30 tablet 3     losartan (COZAAR) 100 MG tablet TAKE 1 TABLET (100MG) BY MOUTH DAILY 28 tablet 3     gabapentin (NEURONTIN) 300 MG capsule TAKE 2 CAPSULES (600MG) BY MOUTH THREE TIMES A  capsule 3     SUMAtriptan (IMITREX) 25 MG tablet Take 1-2 tablets (25-50 mg) by mouth at onset of headache for migraine May repeat in 2 hours. Max 8  tablets/24 hours. 12 tablet 1     acetaminophen (TYLENOL) 500 MG tablet Take 2 tablets (1,000 mg) by mouth 3 times daily as needed for mild pain 100 tablet 0     vitamin D3 (CHOLECALCIFEROL) 54773 UNITS capsule TAKE 1 CAPSULE (50,000 UNITS) BY MOUTH ONCE A WEEK 4 capsule 3     melatonin 5 MG CAPS Take 2 capsules by mouth daily At dinnertime 180 capsule 3     blood glucose monitoring (ONETOUCH VERIO IQ) test strip Use to test blood sugar 4 times daily .  Ok to substitute alternative if insurance prefers. 200 strip 11     NOVOFINE 30 30G X 8 MM insulin pen needle USE 4 DAILY OR AS DIRECTED 100 each 1     DiphenhydrAMINE HCl (DIPHENDRYL PO) Take 25 mg by mouth every 6 hours as needed for itching       clotrimazole (LOTRIMIN) 1 % cream Apply topically 2 times daily       insulin glargine (LANTUS) 100 UNIT/ML injection Inject 45 Units Subcutaneous At Bedtime (Patient taking differently: Inject 45 Units Subcutaneous At Bedtime Lantus dose is 45 units Q HS) 3 mL 11     insulin aspart (NOVOLOG FLEXPEN) 100 UNIT/ML injection Inject 20 Units Subcutaneous 3 times daily (with meals) Once daily, can add additional 5 units if BGs are >500mg/dL. 15 mL 11     prochlorperazine (COMPAZINE) 5 MG tablet Take 1 tablet (5 mg) by mouth every 6 hours as needed for nausea or vomiting 90 tablet 0     ranitidine (ZANTAC) 300 MG tablet TAKE 1 TABLET (300MG) BY MOUTH AT BEDTIME 90 tablet 1     order for DME Diabetic Shoes 2 each 0     blood glucose monitoring (ONE TOUCH DELICA) lancets Use to test blood sugar 2 times daily or as directed.  Ok to substitute alternative if insurance prefers. 150 each 11     albuterol (PROAIR HFA/PROVENTIL HFA/VENTOLIN HFA) 108 (90 BASE) MCG/ACT Inhaler Inhale 2 puffs into the lungs every 6 hours as needed for shortness of breath / dyspnea or wheezing 3 Inhaler 1     senna-docusate (SENOKOT-S;PERICOLACE) 8.6-50 MG per tablet Take 1 tablet by mouth 2 times daily as needed for constipation       ACETAMINOPHEN PO  Take 1,000 mg by mouth every 6 hours as needed for pain or fever       atorvastatin (LIPITOR) 80 MG tablet TAKE 1 TABLET (80MG) BY MOUTH DAILY 28 tablet 11     metoprolol (TOPROL-XL) 100 MG 24 hr tablet TAKE 1 TABLET (100MG) BY MOUTH DAILY 28 tablet 11     polyethylene glycol (MIRALAX/GLYCOLAX) powder TAKE 17 GRAMS (1 CAPFUL) BY MOUTH DAILY 527 g 3     TRULICITY 1.5 MG/0.5ML pen INJECT 1.5MG SUBCUTANEOUSLY EVERY SEVEN DAYS 2 mL 11     aspirin (ASPIRIN LOW DOSE) 81 MG EC tablet Take 1 tablet (81 mg) by mouth daily 100 tablet 4     ibuprofen (ADVIL,MOTRIN) 600 MG tablet Take 1 tablet (600 mg) by mouth every 4 hours as needed for moderate pain 60 tablet 0     glucose 4 G CHEW Take 2 every 15 minutes for blood sugar <70mg/dL. Recheck blood sugar every 15 minutes until above 70mg/dL, then eat a substantial meal. 20 tablet 1     order for DME Hold Novolog if meals are refused. 1 each 0     Vitals                                                                                                                       3, 3   LMP 01/06/2015   Mental Status Exam                                                                                    9, 14 cog gs     Appearance: casually groomed  Behavior/Demeanor: cooperative, with good  eye contact   Speech: normal  Language: intact  Psychomotor: tremor, shaking of right leg.  Mood: depressed  Affect: full range; was congruent to mood; was congruent to content  Thought Process/Associations: unremarkable  Thought Content:  Reports none;  Denies suicidal ideation and violent ideation  Perception:  Reports none;  Denies auditory hallucinations and visual hallucinations  Insight: limited  Judgment: limited  Cognition: (6) does  appear grossly intact; formal cognitive testing was not done  grossly oriented to her circumstances  Gait and Station: unremarkable     Labs and Data                                                                                                                  PHQ9   PHQ-9 SCORE 12/20/2016 1/2/2017 2/3/2017   Total Score - - -   Total Score - - -   Total Score 7 0 0     Diagnosis and Assessment                                                                             m2, h3     Today the following issues were addressed:    1) Psychosis  2) Extrapyramidal side effects    [place] Prescription Monitoring Program [] review was not needed today.    PSYCHOTROPIC DRUG INTERACTIONS: none clinically relevant     Diagnosis:  Schizoaffective Disorder    Plan                                                                                                                    m2, h3      - Increase Invega to 9mg by mouth once daily for the voices.  - Start Trihexyphenidyl 2mg by mouth once daily for extrapyramidal side effects. I am not sure what her drooping of her lower jaw and tongue are due to.   - Continue other medications:  Current Outpatient Prescriptions   Medication Sig     paliperidone (INVEGA) 6 MG 24 hr tablet Take 1 tablet (6 mg) by mouth every morning     trihexyphenidyl (ARTANE) 2 MG tablet Take 1 tablet (2 mg) by mouth daily     [DISCONTINUED] paliperidone (INVEGA) 6 MG 24 hr tablet Take 1 tablet (6 mg) by mouth every morning     losartan (COZAAR) 100 MG tablet TAKE 1 TABLET (100MG) BY MOUTH DAILY     gabapentin (NEURONTIN) 300 MG capsule TAKE 2 CAPSULES (600MG) BY MOUTH THREE TIMES A DAY     SUMAtriptan (IMITREX) 25 MG tablet Take 1-2 tablets (25-50 mg) by mouth at onset of headache for migraine May repeat in 2 hours. Max 8 tablets/24 hours.     acetaminophen (TYLENOL) 500 MG tablet Take 2 tablets (1,000 mg) by mouth 3 times daily as needed for mild pain     vitamin D3 (CHOLECALCIFEROL) 24688 UNITS capsule TAKE 1 CAPSULE (50,000 UNITS) BY MOUTH ONCE A WEEK     melatonin 5 MG CAPS Take 2 capsules by mouth daily At dinnertime     blood glucose monitoring (ONETOUCH VERIO IQ) test strip Use to test blood sugar 4 times daily .  Ok to substitute alternative if insurance  prefers.     NOVOFINE 30 30G X 8 MM insulin pen needle USE 4 DAILY OR AS DIRECTED     DiphenhydrAMINE HCl (DIPHENDRYL PO) Take 25 mg by mouth every 6 hours as needed for itching     clotrimazole (LOTRIMIN) 1 % cream Apply topically 2 times daily     insulin glargine (LANTUS) 100 UNIT/ML injection Inject 45 Units Subcutaneous At Bedtime (Patient taking differently: Inject 45 Units Subcutaneous At Bedtime Lantus dose is 45 units Q HS)     insulin aspart (NOVOLOG FLEXPEN) 100 UNIT/ML injection Inject 20 Units Subcutaneous 3 times daily (with meals) Once daily, can add additional 5 units if BGs are >500mg/dL.     prochlorperazine (COMPAZINE) 5 MG tablet Take 1 tablet (5 mg) by mouth every 6 hours as needed for nausea or vomiting     ranitidine (ZANTAC) 300 MG tablet TAKE 1 TABLET (300MG) BY MOUTH AT BEDTIME     order for DME Diabetic Shoes     blood glucose monitoring (ONE TOUCH DELICA) lancets Use to test blood sugar 2 times daily or as directed.  Ok to substitute alternative if insurance prefers.     albuterol (PROAIR HFA/PROVENTIL HFA/VENTOLIN HFA) 108 (90 BASE) MCG/ACT Inhaler Inhale 2 puffs into the lungs every 6 hours as needed for shortness of breath / dyspnea or wheezing     senna-docusate (SENOKOT-S;PERICOLACE) 8.6-50 MG per tablet Take 1 tablet by mouth 2 times daily as needed for constipation     ACETAMINOPHEN PO Take 1,000 mg by mouth every 6 hours as needed for pain or fever     atorvastatin (LIPITOR) 80 MG tablet TAKE 1 TABLET (80MG) BY MOUTH DAILY     metoprolol (TOPROL-XL) 100 MG 24 hr tablet TAKE 1 TABLET (100MG) BY MOUTH DAILY     polyethylene glycol (MIRALAX/GLYCOLAX) powder TAKE 17 GRAMS (1 CAPFUL) BY MOUTH DAILY     TRULICITY 1.5 MG/0.5ML pen INJECT 1.5MG SUBCUTANEOUSLY EVERY SEVEN DAYS     aspirin (ASPIRIN LOW DOSE) 81 MG EC tablet Take 1 tablet (81 mg) by mouth daily     ibuprofen (ADVIL,MOTRIN) 600 MG tablet Take 1 tablet (600 mg) by mouth every 4 hours as needed for moderate pain     glucose 4 G  CHEW Take 2 every 15 minutes for blood sugar <70mg/dL. Recheck blood sugar every 15 minutes until above 70mg/dL, then eat a substantial meal.     order for DME Hold Novolog if meals are refused.     No current facility-administered medications for this visit.        RTC: 4 weeks    CRISIS NUMBERS:   Provided routinely in AVS.    Treatment Risk Statement:  The patient understands the risks, benefits, adverse effects and alternatives. Agrees to treatment with the capacity to do so. No medical contraindications to treatment. Agrees to call clinic for any problems. The patient understands to call 911 or go to the nearest ED if life threatening or urgent symptoms occur.      Psychiatry Clinic Individual Psychotherapy Note                                                                     [16]     Start time - 11:30 am        End time - 11:40 am    Subjective: This supportive psychotherapy session addressed issues related to family of origin - grief surrounding sister's failing health due to cancer.  Patient's reaction: Contemplation in the context of mental status appropriate for ambulatory setting.  Progress: good  Plan: RTC 4 weeks  Psychotherapy services during this visit included myself and the patient.     PROVIDER:  Kraig Pedersen MD

## 2018-03-09 NOTE — PATIENT INSTRUCTIONS
- Increase Invega to 9mg by mouth once daily for the voices.  - Start Trihexyphenidyl 2mg by mouth once daily for extrapyramidal side effects.      24/7 Crisis Hotlines:    National Suicide Prevention Hotline   822-299-GDHJ (4020)    Crisis Hotlines by County:    [facility] Crisis Line     [phone]  [place] Crisis Line   [phone]  [facility]     [phone]  [place] COPE for Adults   [phone]  [place] for Children    [phone]  [place] for Adults    [phone]  [place] for Children    [phone]  [facility] Crisis Line    [phone]    [facility] Crisis Response Team  [phone] ([phone])  [facility] Crisis is available 24 hours a day. The team provides callers with a needs assessment and referrals to appropriate resources. If medically necessary, the Crisis Response Team assists individuals by traveling to their location to provide face-to-face interventions and assessments. Crisis services are available for adults and children in [facility] and [facility]. Adult Crisis beds are also available if appropriate.    Walk-In Centers and Mobile Teams  [facility] Walk-In Crisis Intervention  [phone]  [facility] COPE (18+) Crisis Mobile Team    [phone]  [facility] Child (under 17) Crisis Mobile Team [phone]  [facility] [place] [facility] for Ad[facility] Walk-In & Mobile Teams [phone]    Additional Crisis Hotlines:  Bridge for Youth      [phone]  Crisis Connection      [phone]  [facility] ([facility] Crisis Services)  [phone]  [facility] Social Service (S)    [phone]  Men s Crisis Line      876-717-EENN ([phone])  National Suicide Prevention Hotline   470-779-ZZMR (4667)  [facility] Crisis Line    [phone]  Suicide Hotline      [phone]  [employer] (formerly First Call for Help)  Dial 2-1-4 (743-240-5929)    Domestic Violence, Rape and Sexual Abuse  Hotlines:  [place] Crisis Line     [phone]  [place] Helpline  [phone]  Domestic Abuse Project (DAP)    1-289.762.1655*  [facility] [place] Crisis Line     [phone]  [place] [facility]  5-895-009-6153*  [facility]       [phone] ([phone]*)  National Domestic Violence Hotline   8-702-852-UZSL  Rape/Sexual Abuse Center Crisis Line    [phone]   Sexual Violence Center (SVC)    [phone]  [facility] [employer] [employer]     [phone] ([phone]*)

## 2018-03-12 ENCOUNTER — TELEPHONE (OUTPATIENT)
Dept: PSYCHIATRY | Facility: CLINIC | Age: 58
End: 2018-03-12

## 2018-03-12 DIAGNOSIS — F25.0 SCHIZOAFFECTIVE DISORDER, BIPOLAR TYPE (H): ICD-10-CM

## 2018-03-12 RX ORDER — PALIPERIDONE 9 MG/1
9 TABLET, EXTENDED RELEASE ORAL EVERY MORNING
Qty: 30 TABLET | Refills: 0 | Status: SHIPPED | OUTPATIENT
Start: 2018-03-12 | End: 2018-03-23

## 2018-03-12 NOTE — TELEPHONE ENCOUNTER
Incoming all from [facility] with pt's group home, she would like to confirm the dose of Invega.  Pt current dose is 6 mg daily, but in Dr. Nuvia BARNES notes from 3/9 it says to increase to 9 mg daily. Please call Dinorah guerrier with changes.  Dinorah guerrier - [phone]  [facility] - [phone]      Giorgi Case

## 2018-03-13 ENCOUNTER — OFFICE VISIT (OUTPATIENT)
Dept: FAMILY MEDICINE | Facility: CLINIC | Age: 58
End: 2018-03-13
Payer: MEDICARE

## 2018-03-13 ENCOUNTER — OFFICE VISIT (OUTPATIENT)
Dept: BEHAVIORAL HEALTH | Facility: CLINIC | Age: 58
End: 2018-03-13
Payer: MEDICARE

## 2018-03-13 ENCOUNTER — OFFICE VISIT (OUTPATIENT)
Dept: PHARMACY | Facility: CLINIC | Age: 58
End: 2018-03-13
Payer: MEDICARE

## 2018-03-13 VITALS
DIASTOLIC BLOOD PRESSURE: 72 MMHG | TEMPERATURE: 98.2 F | RESPIRATION RATE: 14 BRPM | BODY MASS INDEX: 45.94 KG/M2 | SYSTOLIC BLOOD PRESSURE: 112 MMHG | WEIGHT: 236 LBS | OXYGEN SATURATION: 97 % | HEART RATE: 83 BPM

## 2018-03-13 DIAGNOSIS — Z79.4 TYPE 2 DIABETES MELLITUS WITH HYPERGLYCEMIA, WITH LONG-TERM CURRENT USE OF INSULIN (H): Primary | ICD-10-CM

## 2018-03-13 DIAGNOSIS — E63.9 NUTRITIONAL DEFICIENCY: ICD-10-CM

## 2018-03-13 DIAGNOSIS — F25.1 SCHIZOAFFECTIVE DISORDER, DEPRESSIVE TYPE (H): ICD-10-CM

## 2018-03-13 DIAGNOSIS — E11.65 TYPE 2 DIABETES MELLITUS WITH HYPERGLYCEMIA, WITH LONG-TERM CURRENT USE OF INSULIN (H): Primary | ICD-10-CM

## 2018-03-13 DIAGNOSIS — E11.3299 TYPE 2 DIABETES MELLITUS WITH MILD NONPROLIFERATIVE RETINOPATHY WITHOUT MACULAR EDEMA, WITH LONG-TERM CURRENT USE OF INSULIN, UNSPECIFIED LATERALITY (H): Primary | ICD-10-CM

## 2018-03-13 DIAGNOSIS — E11.3299 TYPE 2 DIABETES MELLITUS WITH MILD NONPROLIFERATIVE RETINOPATHY WITHOUT MACULAR EDEMA, WITH LONG-TERM CURRENT USE OF INSULIN, UNSPECIFIED LATERALITY (H): ICD-10-CM

## 2018-03-13 DIAGNOSIS — Z79.4 TYPE 2 DIABETES MELLITUS WITH MILD NONPROLIFERATIVE RETINOPATHY WITHOUT MACULAR EDEMA, WITH LONG-TERM CURRENT USE OF INSULIN, UNSPECIFIED LATERALITY (H): Primary | ICD-10-CM

## 2018-03-13 DIAGNOSIS — M81.8 OTHER OSTEOPOROSIS WITHOUT CURRENT PATHOLOGICAL FRACTURE: ICD-10-CM

## 2018-03-13 DIAGNOSIS — F43.10 POSTTRAUMATIC STRESS DISORDER: ICD-10-CM

## 2018-03-13 DIAGNOSIS — K21.9 GASTROESOPHAGEAL REFLUX DISEASE WITHOUT ESOPHAGITIS: ICD-10-CM

## 2018-03-13 DIAGNOSIS — K59.00 CONSTIPATION, UNSPECIFIED CONSTIPATION TYPE: ICD-10-CM

## 2018-03-13 DIAGNOSIS — F25.1 SCHIZOAFFECTIVE DISORDER, DEPRESSIVE TYPE (H): Primary | ICD-10-CM

## 2018-03-13 DIAGNOSIS — Z79.4 TYPE 2 DIABETES MELLITUS WITH MILD NONPROLIFERATIVE RETINOPATHY WITHOUT MACULAR EDEMA, WITH LONG-TERM CURRENT USE OF INSULIN, UNSPECIFIED LATERALITY (H): ICD-10-CM

## 2018-03-13 DIAGNOSIS — G47.00 INSOMNIA, UNSPECIFIED TYPE: ICD-10-CM

## 2018-03-13 DIAGNOSIS — E55.9 VITAMIN D DEFICIENCY: ICD-10-CM

## 2018-03-13 PROCEDURE — 99606 MTMS BY PHARM EST 15 MIN: CPT | Mod: GY | Performed by: PHARMACIST

## 2018-03-13 PROCEDURE — 99607 MTMS BY PHARM ADDL 15 MIN: CPT | Mod: GY | Performed by: PHARMACIST

## 2018-03-13 PROCEDURE — 99214 OFFICE O/P EST MOD 30 MIN: CPT | Performed by: NURSE PRACTITIONER

## 2018-03-13 PROCEDURE — 90834 PSYTX W PT 45 MINUTES: CPT | Performed by: SOCIAL WORKER

## 2018-03-13 RX ORDER — CHOLECALCIFEROL (VITAMIN D3) 1250 MCG
CAPSULE ORAL
Qty: 12 CAPSULE | Refills: 1 | Status: SHIPPED | OUTPATIENT
Start: 2018-03-13 | End: 2019-03-13

## 2018-03-13 ASSESSMENT — ANXIETY QUESTIONNAIRES
7. FEELING AFRAID AS IF SOMETHING AWFUL MIGHT HAPPEN: NEARLY EVERY DAY
1. FEELING NERVOUS, ANXIOUS, OR ON EDGE: SEVERAL DAYS
GAD7 TOTAL SCORE: 17
IF YOU CHECKED OFF ANY PROBLEMS ON THIS QUESTIONNAIRE, HOW DIFFICULT HAVE THESE PROBLEMS MADE IT FOR YOU TO DO YOUR WORK, TAKE CARE OF THINGS AT HOME, OR GET ALONG WITH OTHER PEOPLE: NOT DIFFICULT AT ALL
5. BEING SO RESTLESS THAT IT IS HARD TO SIT STILL: NEARLY EVERY DAY
2. NOT BEING ABLE TO STOP OR CONTROL WORRYING: NEARLY EVERY DAY
6. BECOMING EASILY ANNOYED OR IRRITABLE: NEARLY EVERY DAY
3. WORRYING TOO MUCH ABOUT DIFFERENT THINGS: NEARLY EVERY DAY

## 2018-03-13 ASSESSMENT — PATIENT HEALTH QUESTIONNAIRE - PHQ9: 5. POOR APPETITE OR OVEREATING: SEVERAL DAYS

## 2018-03-13 NOTE — MR AVS SNAPSHOT
After Visit Summary   3/13/2018    Nena Tang    MRN: [id number]           Patient Information     Date Of Birth          1960        Visit Information        Provider Department      3/13/2018 10:00 AM Eugenia De Jesus, [facility]        Today's Diagnoses     Type 2 diabetes mellitus with hyperglycemia, with long-term current use of insulin (H)    -  1    Schizoaffective disorder, depressive type (H)        Constipation, unspecified constipation type        Insomnia, unspecified type        Nutritional deficiency        Gastroesophageal reflux disease without esophagitis          Care Instructions    See AVS from PCP encounter for details           Follow-ups after your visit        Your next 10 appointments already scheduled     Mar 20, 2018  1:30 PM CDT   Return Visit with Richardson Lopez [facility] ([facility])    [address]  [address]  [facility] [address]   [phone]            Mar 23, 2018  4:00 PM CDT   Return Visit with Kraig Pedersen MD   [facility] ([facility])    [address]  [facility] [address]   [phone]            Apr 17, 2018  2:00 PM CDT   Return Visit with ART Arias [facility] ([facility])    [address]  [address]  [facility] [address]   [phone]            Apr 17, 2018  2:00 PM CDT   Return Visit with Richardson Lopez [facility] ([facility])    [address]  [address]  [facility] [address]   [phone]            Apr 17, 2018  2:00 PM CDT   SHORT with Eugenia De Jesus [facility] ([facility])    [address] [place] [address]  [address]  [facility] [address] [phone]              Who to contact     If you have questions or need follow up information about today's clinic visit or your schedule please contact [facility] directly at [phone].  Normal or non-critical lab and imaging results will be communicated to you by MyChart, letter or phone within 4 business days after the clinic has received the results. If you do not hear from us within 7 days, please contact the clinic through [employer] or phone. If you have a critical or abnormal lab result, we will notify you by phone as soon as possible.  Submit refill requests through [employer] or call your pharmacy and they will forward the refill request to us. Please allow 3 business days for your refill to be completed.          Additional Information About Your Visit        [employer] Information     [employer] gives you secure access to your electronic health record. If you see a primary care provider, you can also send messages to your care team and make appointments. If you have questions, please call your primary care clinic.  If you do not have a primary care provider, please call [phone] and they will assist you.        Care EveryWhere ID     This is your Care EveryWhere ID. This could be used by other organizations to access your [facility] medical records  [id number]        Your Vitals Were     Last Period                   01/06/2015            Blood Pressure from Last 3 Encounters:   03/13/18 112/72   02/12/18 110/62   02/09/18 161/84    Weight from Last 3 Encounters:   03/13/18 236 lb (107 kg)   02/12/18 228 lb 8 oz (103.6 kg)   01/23/18 226 lb 8 oz (102.7 kg)              Today, you had the following     No orders found for display         Today's Medication Changes          These changes are accurate as of 3/13/18 11:59 PM.  If you have any questions, ask your nurse or doctor.               Start taking these medicines.        Dose/Directions     calcium carbonate 1500 (600 CA) MG tablet   Commonly known as:  OS-ALLEN 600 mg [place]. Ca   Used for:  Other osteoporosis without current pathological fracture   Started by:  Rowena Haas APRN CNP        Dose:  1500 mg   Take 1 tablet (1,500 mg) by mouth daily   Quantity:  180 tablet   Refills:  3         These medicines have changed or have updated prescriptions.        Dose/Directions    insulin glargine 100 UNIT/ML injection   Commonly known as:  LANTUS   This may have changed:  how much to take   Used for:  Type 2 diabetes mellitus with mild nonproliferative retinopathy without macular edema, with long-term current use of insulin, unspecified laterality (H)   Changed by:  Rowena Haas APRN CNP        Dose:  48 Units   Inject 48 Units Subcutaneous At Bedtime   Quantity:  3 mL   Refills:  11       insulin pen needle 30G X 8 MM   Commonly known as:  NOVOFINE 30   This may have changed:  See the new instructions.   Used for:  Type 2 diabetes mellitus with mild nonproliferative retinopathy without macular edema, with long-term current use of insulin, unspecified laterality (H)   Changed by:  Rowena Haas APRN CNP        USE 4 DAILY OR AS DIRECTED   Quantity:  400 each   Refills:  0       vitamin D3 24902 UNITS capsule   Commonly known as:  CHOLECALCIFEROL   This may have changed:  additional instructions   Used for:  Vitamin D deficiency   Changed by:  Rowena Haas APRN CNP        TAKE 1 CAPSULE (50,000 UNITS) MONTHLY   Quantity:  12 capsule   Refills:  1            Where to get your medicines      These medications were sent to [address] [address]     Phone:  [phone]     calcium carbonate 1500 (600 CA) MG tablet    insulin glargine 100 UNIT/ML injection    insulin pen needle 30G X 8 MM    vitamin D3 79375 UNITS capsule                Primary Care Provider Office Phone # Fax #    Rowena Haas  APRN [facility] [phone]       [address] [facility] [address]  [facility] [address]        Goals        General    Medical (pt-stated)     Notes - Note created  7/7/2016  9:15 AM by Chelsea Pulido, RN    Get blood sugars under control    Improve medication compliance    As of today's date 7/7/2016 goal is met at 0 - 25%.   Goal Status:  Active          Equal Access to Services     EMAKIMBERLY HEMA AH: Hadii aad ku hadasho Soomaali, waaxda luqadaha, qaybta kaalmada adeegyada, waxay idiin hayaan adeeg kharash la'aan . So [facility] [phone].    ATENCIÓN: Si habla español, tiene a trinh disposición servicios gratuitos de asistencia lingüística. Llame al [phone].    We comply with applicable federal civil rights laws and [place] laws. We do not discriminate on the basis of race, color, national origin, age, disability, sex, sexual orientation, or gender identity.            Thank you!     Thank you for choosing [facility]  for your care. Our goal is always to provide you with excellent care. Hearing back from our patients is one way we can continue to improve our services. Please take a few minutes to complete the written survey that you may receive in the mail after your visit with us. Thank you!             Your Updated Medication List - Protect others around you: Learn how to safely use, store and throw away your medicines at www.disposemymeds.org.          This list is accurate as of 3/13/18 11:59 PM.  Always use your most recent med list.                   Brand Name Dispense Instructions for use Diagnosis    * ACETAMINOPHEN PO      Take 1,000 mg by mouth every 6 hours as needed for pain or fever        * acetaminophen 500 MG tablet    TYLENOL    100 tablet    Take 2 tablets (1,000 mg) by mouth 3 times daily as needed for mild pain    Chronic low back pain, unspecified back pain laterality, with sciatica presence unspecified       albuterol 108 (90 BASE) MCG/ACT Inhaler     PROAIR HFA/PROVENTIL HFA/VENTOLIN HFA    3 Inhaler    Inhale 2 puffs into the lungs every 6 hours as needed for shortness of breath / dyspnea or wheezing    Dyspnea on exertion       atorvastatin 80 MG tablet    LIPITOR    28 tablet    TAKE 1 TABLET (80MG) BY MOUTH DAILY    Hyperlipidemia LDL goal <100       blood glucose monitoring lancets     150 each    Use to test blood sugar 2 times daily or as directed.  Ok to substitute alternative if insurance prefers.    Type 2 diabetes mellitus with diabetic neuropathy, with long-term current use of insulin (H)       blood glucose monitoring test strip    ONETOUCH VERIO IQ    200 strip    Use to test blood sugar 4 times daily .  Ok to substitute alternative if insurance prefers.    Type 2 diabetes mellitus with diabetic neuropathy, with long-term current use of insulin (H)       calcium carbonate 1500 (600 CA) MG tablet    OS-ALLEN 600 mg [place]. Ca    180 tablet    Take 1 tablet (1,500 mg) by mouth daily    Other osteoporosis without current pathological fracture       clotrimazole 1 % cream    LOTRIMIN     Apply topically 2 times daily        DIPHENDRYL PO      Take 25 mg by mouth every 6 hours as needed for itching        gabapentin 300 MG capsule    NEURONTIN    168 capsule    TAKE 2 CAPSULES (600MG) BY MOUTH THREE TIMES A DAY    Type 2 diabetes mellitus with diabetic neuropathy, with long-term current use of insulin (H)       glucose 4 G Chew chewable tablet     20 tablet    Take 2 every 15 minutes for blood sugar <70mg/dL. Recheck blood sugar every 15 minutes until above 70mg/dL, then eat a substantial meal.    Type 2 diabetes mellitus with mild nonproliferative retinopathy without macular edema, with long-term current use of insulin, unspecified laterality (H)       ibuprofen 600 MG tablet    ADVIL/MOTRIN    60 tablet    Take 1 tablet (600 mg) by mouth every 4 hours as needed for moderate pain    Closed fracture of one rib of right side, initial encounter       insulin  aspart 100 UNIT/ML injection    NovoLOG FLEXPEN    15 mL    Inject 20 Units Subcutaneous 3 times daily (with meals) Once daily, can add additional 5 units if BGs are >500mg/dL.    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, with long-term current use of insulin, unspecified laterality (H)       insulin pen needle 30G X 8 MM    NOVOFINE 30    400 each    USE 4 DAILY OR AS DIRECTED    Type 2 diabetes mellitus with mild nonproliferative retinopathy without macular edema, with long-term current use of insulin, unspecified laterality (H)       losartan 100 MG tablet    COZAAR    28 tablet    TAKE 1 TABLET (100MG) BY MOUTH DAILY    Coronary artery disease involving native heart with angina pectoris, unspecified vessel or lesion type (H)       melatonin 5 MG Caps     180 capsule    Take 2 capsules by mouth daily At dinnertime    Insomnia, unspecified type       metoprolol succinate 100 MG 24 hr tablet    TOPROL-XL    28 tablet    TAKE 1 TABLET (100MG) BY MOUTH DAILY    Coronary artery disease involving native heart with angina pectoris, unspecified vessel or lesion type (H)       * order for DME     1 each    Hold Novolog if meals are refused.    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, with long-term current use of insulin, unspecified laterality (H)       paliperidone 9 MG 24 hr tablet    INVEGA    30 tablet    Take 1 tablet (9 mg) by mouth every morning    Schizoaffective  disorder, bipolar type (H)       polyethylene glycol powder    MIRALAX/GLYCOLAX    527 g    TAKE 17 GRAMS (1 CAPFUL) BY MOUTH DAILY    Constipation, unspecified constipation type       prochlorperazine 5 MG tablet    COMPAZINE    90 tablet    Take 1 tablet (5 mg) by mouth every 6 hours as needed for nausea or vomiting    Nausea       ranitidine 300 MG tablet    ZANTAC    90 tablet    TAKE 1 TABLET (300MG) BY MOUTH AT BEDTIME    Gastroesophageal reflux disease without esophagitis       senna-docusate 8.6-50 MG per tablet    SENOKOT-S;PERICOLACE     Take 1 tablet by mouth 2 times daily as needed for constipation        SUMAtriptan 25 MG tablet    IMITREX    12 tablet    Take 1-2 tablets (25-50 mg) by mouth at onset of headache for migraine May repeat in 2 hours. Max 8 tablets/24 hours.    Migraine with aura and without status migrainosus, not intractable       trihexyphenidyl 2 MG tablet    ARTANE    30 tablet    Take 1 tablet (2 mg) by mouth daily    Schizoaffective disorder, bipolar type (H), Extrapyramidal and movement disorder       TRULICITY 1.5 MG/0.5ML pen   Generic drug:  dulaglutide     2 mL    INJECT 1.5MG SUBCUTANEOUSLY EVERY SEVEN DAYS    Type 2 diabetes mellitus with mild nonproliferative retinopathy without macular edema, with long-term current use of insulin, unspecified laterality (H)       vitamin D3 22702 UNITS capsule    CHOLECALCIFEROL    12 capsule    TAKE 1 CAPSULE (50,000 UNITS) MONTHLY    Vitamin D deficiency       * Notice:  This list has 4 medication(s) that are the same as other medications prescribed for you. Read the directions carefully, and ask your doctor or other care provider to review them with you.

## 2018-03-13 NOTE — PROGRESS NOTES
SUBJECTIVE/OBJECTIVE:                Nena Tang is a 58 year old female coming in for a follow-up visit for Medication Therapy Management.  She was referred to me from Rowena Haas.  This was a co-visit with Riaz Montenegro ([facility]) and Rowena Haas (PCP).     Chief Complaint: Follow up from our visit on 1/23/18.    Tobacco: No tobacco use  Alcohol: not currently using    Medication Adherence/Access:  no issues reported, set up and administered by group home staff, except when pt leaves group home (ex lunches at [employer] in [place], trip to [place] to see her sister)    Diabetes:  Pt currently taking lantus 45u QHS, novolog 20u TID with meals, and trulicity 1.5mg subcutaneously each week.  Pt is not experiencing side effects.  SMBG: four times daily. Ranges (from patient's glucose log):  Date FBG/ 2hours post Lunch/2hours post Dinner /2hours post    3/13 124      3/12 155 bf declined 117 229 236    3/11 148 216 283 320   3/10 147 212 172 248   3/9 221 bf declined 175 202 196   3/8 180 280 318 283   3/7 119 223 255 179   Patient is not experiencing hypoglycemia (lowest BG in past month was 96)  Recent symptoms of high blood sugar? none  Eye exam: up to date  Foot exam: up to date  Microalbumin is < 30 mg/g. Pt is taking an ACEi/ARB.  Aspirin: Taking 81mg daily and denies side effects  Diet/Exercise: Tries to eat a lot of vegetables (greens, cauliflower, and broccoli), occasional sweets and chips; walks a lot at the [employer]-in center but gets tired after walking too far.  Plans to get some walking done outside once the weather improves.      Depression/PTSD/schizoaffective: Currently taking invega 9mg every morning (recent dose increase) and artane 2mg daily (started a couple of days ago).  Has not been on an antidepressant since being discharged from hospital in January.  Reports sadness for over a month due to the health of her sister in [place] (terminal cancer).  Tearful throughout visit. Worry is making it difficult  "to sleep.  Visits drop-in center every day to distract (cards, movies, and parties).  Reports some thoughts of suicidal ideation (no specific plans, however asks today if a person can OD on insulin).  She continues to eat but appetite has decreased.  Reports she thinks she'll need to be hospitalized when her sister passes away.  Getting to the point where \"she doesn't care anymore about her health.\"   Continues to have hallucinations, visual and auditory. See [facility] note for additional details.  SE: bothersome leg tremor has improved dramatically with discontinuation of Haldol. Pt has noticeable tongue thrusting today, which was also noted on last psychiatry exam when Artane was added.    Migraines: Currently taking tylenol or ibuprofen for headaches but doesn't think these are effective.  Has sumatriptan 25mg tablets 1-2 PRN at onset of migraine.  She thinks her irregular sleep may be causing some of the headaches.  With migraines, reports some dizziness.  Frequency is almost weekly.       Constipation: Has senekot-S PRN for constipation on her profile.  Pt does not recall ever taking this.  Reports she uses 17g of miralax every morning and soft stools, denies diarrhea.     GERD: Currently taking ranitidine 300mg tablet QHS.  Denies symptoms of acid reflux or heartburn.  No reported side effects.    Nutritional deficiency: Currently taking weekly vitamin D 88867l.  Last vitamin D level was 69 last November.     Dietary calcium intake: no yogurt, little milk, some cheese.     Current labs include:    BP Readings from Last 3 Encounters:   03/13/18 112/72   02/12/18 110/62   02/09/18 161/84     Lab Results   Component Value Date    A1C 6.8 02/12/2018   .  Lab Results   Component Value Date    CHOL 154 06/27/2017     Lab Results   Component Value Date    TRIG 267 06/27/2017     Lab Results   Component Value Date    HDL 37 06/27/2017     Lab Results   Component Value Date    LDL 64 06/27/2017       Liver Function Studies - "   Recent Labs   Lab Test  02/08/18   2155   PROTTOTAL  8.0   ALBUMIN  3.7   BILITOTAL  0.2   ALKPHOS  99   AST  12   ALT  23       Lab Results   Component Value Date    UCRR 160 06/27/2017    MICROL 11 06/27/2017    UMALCR 6.81 06/27/2017       Last Basic Metabolic Panel:  Lab Results   Component Value Date     02/08/2018      Lab Results   Component Value Date    POTASSIUM 4.3 02/08/2018     Lab Results   Component Value Date    CHLORIDE 103 02/08/2018     Lab Results   Component Value Date    BUN 19 02/08/2018     Lab Results   Component Value Date    CR 1.09 02/08/2018     GFR Estimate   Date Value Ref Range Status   02/08/2018 52 (L) >60 mL/min/1.7m2 Final     Comment:     Non  GFR Calc   01/23/2018 64 >60 mL/min/1.7m2 Final     Comment:     Non  GFR Calc   01/14/2018 51 (L) >60 mL/min/1.7m2 Final     Comment:     Non  GFR Calc     GFR Estimate If Black   Date Value Ref Range Status   02/08/2018 63 >60 mL/min/1.7m2 Final     Comment:      GFR Calc   01/23/2018 77 >60 mL/min/1.7m2 Final     Comment:      GFR Calc   01/14/2018 61 >60 mL/min/1.7m2 Final     Comment:      GFR Calc     TSH   Date Value Ref Range Status   11/07/2017 3.21 0.40 - 4.00 mU/L Final   ]    Most Recent Immunizations   Administered Date(s) Administered     Influenza (IIV3) PF 09/24/2012     Influenza Vaccine IM 3yrs+ 4 Valent IIV4 11/06/2017     Mantoux Tuberculin Skin Test 07/10/2014     Pneumococcal 23 valent 01/22/2009     TDAP Vaccine (Adacel) 01/22/2009       ASSESSMENT:              Current medications were reviewed today as discussed above.      Medication Adherence: good, no issues identified    Diabetes: lc Perez  Pt is currently meeting her A1C goal of <7%.  Reported fasting BG levels over the past week have ranged from 119-221mg/dl.  She is not consistently meeting her fasting SMBG goal of 80-130mg/dl.  Her post prandial SMBGs  have also ranged from 172-320 over the past week.   She would benefit from additional basal insulin (lantus) dosing to reduce instances of hyperglycemia.    Depression/PTSD/schizoaffective: needs improvement- Pt's mood has been deeply impacted by the health of her sister.  Previous antidepressants have been ineffective for the patient.  In discussion with her PCP, patient would more likely benefit from frequent counseling at this time as opposed to adding additional pharmacotherapy.  Pt would benefit from frequent follow-up with mental health and medical providers, currently scheduled weekly.  Will also reach out to psychiatry to report increased symptoms of sadness and hopelessness     Migraines: Needs improvement - Continues to treat with sumatriptan as needed.  Sleep difficulties may be exacerbating frequency and/or severity.  Further intervention for mood stability may prove beneficial with headaches.  No additional medication adjustment is necessary at this time.          Constipation: Stable - Current therapy is effective.  Continue daily miralax with senekot-S as needed.         GERD: Stable.    Nutritional deficiency: Needs improvement - Pt is not receiving supplemental calcium and is receiving very little dietary calcium.  DEXA from last June was indicative of osteoporosis.  Would benefit from additional calcium supplement.  Vitamin D level from November was high-normal and therefore could be reduced to monthly versus weekly.       PLAN:                  1. Increase lantus from 45u QHS to 48u QHS; continue SMBG four times daily    2. Decrease frequency of vitamin D 72681v from weekly to monthly    3. Start calcium carbonate 600mg daily     4. Outreach to Dr Pedersen psychiatry - recommends moving up follow-up appointment due to increased depressive symptoms for further evaluation.     I spent 40 minutes with this patient today. All changes were made via collaborative practice agreement with Rowena Haas  copy of the visit note was provided to the patient's primary care provider.     Will follow up in 2-3 weeks.    The patient was given a summary of these recommendations as an after visit summary.    John Bess, 2018 PharmD Candidate  Eugenia De Jesus, KiD, BCACP

## 2018-03-13 NOTE — PROGRESS NOTES
SUBJECTIVE:                                                    Nena Tang is a 58 year old  female who presents to clinic today for the following health issues:  [facility]: Riaz COCHRAN MTM: Eugenia WILCOX    Diabetes Follow-up  Patient states that she has been checking her blood sugars about four times per day. Records show ranges between  over the past month.   Patient is checking blood sugars: four times daily.    Blood sugar testing frequency justification: Uncontrolled diabetes    Diabetic concerns: other - up and down blood sugars.      Symptoms of hypoglycemia (low blood sugar): none     Paresthesias (numbness or burning in feet) or sores: No     Date of last diabetic eye exam: current- done 2/12/2018, scheduled for a 2 month follow-up.     BP Readings from Last 2 Encounters:   03/13/18 112/72   02/12/18 110/62     Hemoglobin A1C (%)   Date Value   02/12/2018 6.8 (H)   12/09/2017 8.9 (H)     LDL Cholesterol Calculated (mg/dL)   Date Value   06/27/2017 64   07/14/2015 78     LDL Cholesterol Direct (mg/dL)   Date Value   07/13/2016 137 (H)     Hypertension Follow-up  Reports headaches and some dizziness. Denies any chest pain or shortness of breath.     Outpatient blood pressures are not being checked.    Low Salt Diet: no added salt; patient states that she like adding salt to her food. Discussed with patient that this might increase her BP and swelling to her ankles.     Mental Health Follow up: Depression/Anxiety  Patient states that she recently went to see her sister in [place] who has a terminal illness and would possibly be dying soon. Patient states that she would like to see her again before she dies. Patient is planning another trip with her sister to go visit again sooner. States that her caregivers have been supportive during this time.   Patient reports that she is still going to the drop in center daily. Reports thoughts of wanting to harm herself, states that he has no plans.  Reports that she will communicate her feelings to her caregiver if she needs to go to the hospital.   States that the voices in her head are stronger now that because of her fears and     Status since last visit: depression has been up lately.     See PHQ-9 for current symptoms.  Other associated symptoms: None    Complicating factors: none  Significant life event:  No   Current substance abuse:  None  Anxiety or Panic symptoms:  No    Sleep - not able to sleep, due to increased worrying.   Appetite - good.   Exercise - walking, nothing regularly.     Smoking - no  Alcohol - no  Street drugs - no  Marijuana - no    PHQ-9  English PHQ-9   Any Language               Problems taking medications regularly: No    Medication side effects: none    Diet: regular (no restrictions)    Social History   Substance Use Topics     Smoking status: Never Smoker     Smokeless tobacco: Never Used     Alcohol use No      Comment: last month        Problem list and histories reviewed & adjusted, as indicated.  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 (H)     Illiterate     Irritable bowel syndrome     overweight - BMI >35     Takotsubo cardiomyopathy     CAD (coronary artery disease)     Restless legs syndrome (RLS)     CINDY (obstructive sleep apnea)- mild AHI 10.3     Verbal auditory hallucination     Chronic low back pain     Schizoaffective disorder, depressive type (H)     Migraine headache     HTN, goal below 140/90     Health Care Home     Lumbago     Cervicalgia     Cocaine abuse, episodic     Suicidal ideation     Esophageal reflux     Mild nonproliferative diabetic retinopathy (H)     Tardive dyskinesia     Alcohol use     Left cataract     Falls frequently     History of uterine cancer     Psychophysiological  insomnia     Dysuria     Asymptomatic postmenopausal status     Abdominal pain, right lower quadrant     Sepsis (H)     Pneumonia of right lower lobe due to infectious organism (H)     Infectious encephalopathy     Non-intractable vomiting with nausea     Acute respiratory failure with hypoxia (H)     Thoughts of self harm     Gastroenteritis     Posttraumatic stress disorder     Past Surgical History:   Procedure Laterality Date     C OOPHORECTORMY FOR RAHEL, W/BX  1983    UTERINE     CATARACT IOL, RT/LT Bilateral 2017     COLONOSCOPY N/A 3/16/2017    Procedure: COLONOSCOPY;  Surgeon: Traci Gonzalez MD;  Location:  GI     Coronary CTA  5/21/2014     HYSTERECTOMY  1983    uterine cancer yearly pap's per provider.     LAPAROSCOPIC CHOLECYSTECTOMY  2008     PHACOEMULSIFICATION CLEAR CORNEA WITH STANDARD INTRAOCULAR LENS IMPLANT Left 5/5/2017    Procedure: PHACOEMULSIFICATION CLEAR CORNEA WITH STANDARD INTRAOCULAR LENS IMPLANT;  LEFT EYE PHACOEMULSIFICATION CLEAR CORNEA WITH STANDARD INTRAOCULAR LENS IMPLANT ;  Surgeon: Tyra Diaz MD;  Location:  [facility]     PHACOEMULSIFICATION CLEAR CORNEA WITH STANDARD INTRAOCULAR LENS IMPLANT Right 6/30/2017    Procedure: PHACOEMULSIFICATION CLEAR CORNEA WITH STANDARD INTRAOCULAR LENS IMPLANT;  RIGHT EYE PHACOEMULSIFICATION CLEAR CORNEA WITH STANDARD INTRAOCULAR LENS IMPLANT;  Surgeon: Tyra Diaz MD;  Location:  [facility]     RELEASE TRIGGER FINGER  10/11/2012    Left thumb. Procedure: RELEASE TRIGGER FINGER;  LEFT THUMB TRIGGER RELEASE;  Surgeon: Tay Langley MD;  Location:  [facility]     RELEASE TRIGGER FINGER Right 12/26/2016    Procedure: RELEASE TRIGGER FINGER;  Surgeon: Albino Castañeda MD;  Location: [facility] OR       Social History   Substance Use Topics     Smoking status: Never Smoker     Smokeless tobacco: Never Used     Alcohol use No      Comment: last month     Family History   Problem Relation Age of Onset     CANCER Mother      BLADDER      Respiratory Mother      COPD     GASTROINTESTINAL DISEASE Mother      CIRRHOSIS OF LI BOLIVAR     Alcohol/Drug Mother      DIABETES Mother      Hypertension Mother      Lipids Mother      C.A.D. Mother      Glaucoma Mother      Alcohol/Drug Sister      MENTAL ILLNESS Sister      Alcohol/Drug Sister      Psychotic Disorder Sister      CANCER Maternal Grandmother      UNKNOWN TYPE     CANCER Brother      COLON     Cancer - colorectal Brother      IN HIS LATE 30S     Alcohol/Drug Brother       OF HEROIN OVERDOSE AT AGE 22 YRS     Macular Degeneration No family hx of            Current Outpatient Prescriptions   Medication Sig Dispense Refill     paliperidone (INVEGA) 9 MG 24 hr tablet Take 1 tablet (9 mg) by mouth every morning 30 tablet 0     trihexyphenidyl (ARTANE) 2 MG tablet Take 1 tablet (2 mg) by mouth daily 30 tablet 0     losartan (COZAAR) 100 MG tablet TAKE 1 TABLET (100MG) BY MOUTH DAILY 28 tablet 3     gabapentin (NEURONTIN) 300 MG capsule TAKE 2 CAPSULES (600MG) BY MOUTH THREE TIMES A  capsule 3     SUMAtriptan (IMITREX) 25 MG tablet Take 1-2 tablets (25-50 mg) by mouth at onset of headache for migraine May repeat in 2 hours. Max 8 tablets/24 hours. 12 tablet 1     acetaminophen (TYLENOL) 500 MG tablet Take 2 tablets (1,000 mg) by mouth 3 times daily as needed for mild pain 100 tablet 0     vitamin D3 (CHOLECALCIFEROL) 66797 UNITS capsule TAKE 1 CAPSULE (50,000 UNITS) BY MOUTH ONCE A WEEK 4 capsule 3     melatonin 5 MG CAPS Take 2 capsules by mouth daily At dinnertime 180 capsule 3     blood glucose monitoring (ONETOUCH VERIO IQ) test strip Use to test blood sugar 4 times daily .  Ok to substitute alternative if insurance prefers. 200 strip 11     NOVOFINE 30 30G X 8 MM insulin pen needle USE 4 DAILY OR AS DIRECTED 100 each 1     DiphenhydrAMINE HCl (DIPHENDRYL PO) Take 25 mg by mouth every 6 hours as needed for itching       clotrimazole (LOTRIMIN) 1 % cream Apply topically 2 times daily        insulin glargine (LANTUS) 100 UNIT/ML injection Inject 45 Units Subcutaneous At Bedtime (Patient taking differently: Inject 45 Units Subcutaneous At Bedtime Lantus dose is 45 units Q HS) 3 mL 11     insulin aspart (NOVOLOG FLEXPEN) 100 UNIT/ML injection Inject 20 Units Subcutaneous 3 times daily (with meals) Once daily, can add additional 5 units if BGs are >500mg/dL. 15 mL 11     prochlorperazine (COMPAZINE) 5 MG tablet Take 1 tablet (5 mg) by mouth every 6 hours as needed for nausea or vomiting 90 tablet 0     ranitidine (ZANTAC) 300 MG tablet TAKE 1 TABLET (300MG) BY MOUTH AT BEDTIME 90 tablet 1     order for DME Diabetic Shoes 2 each 0     blood glucose monitoring (ONE TOUCH DELICA) lancets Use to test blood sugar 2 times daily or as directed.  Ok to substitute alternative if insurance prefers. 150 each 11     albuterol (PROAIR HFA/PROVENTIL HFA/VENTOLIN HFA) 108 (90 BASE) MCG/ACT Inhaler Inhale 2 puffs into the lungs every 6 hours as needed for shortness of breath / dyspnea or wheezing 3 Inhaler 1     senna-docusate (SENOKOT-S;PERICOLACE) 8.6-50 MG per tablet Take 1 tablet by mouth 2 times daily as needed for constipation       ACETAMINOPHEN PO Take 1,000 mg by mouth every 6 hours as needed for pain or fever       atorvastatin (LIPITOR) 80 MG tablet TAKE 1 TABLET (80MG) BY MOUTH DAILY 28 tablet 11     metoprolol (TOPROL-XL) 100 MG 24 hr tablet TAKE 1 TABLET (100MG) BY MOUTH DAILY 28 tablet 11     polyethylene glycol (MIRALAX/GLYCOLAX) powder TAKE 17 GRAMS (1 CAPFUL) BY MOUTH DAILY 527 g 3     TRULICITY 1.5 MG/0.5ML pen INJECT 1.5MG SUBCUTANEOUSLY EVERY SEVEN DAYS 2 mL 11     aspirin (ASPIRIN LOW DOSE) 81 MG EC tablet Take 1 tablet (81 mg) by mouth daily 100 tablet 4     ibuprofen (ADVIL,MOTRIN) 600 MG tablet Take 1 tablet (600 mg) by mouth every 4 hours as needed for moderate pain 60 tablet 0     glucose 4 G CHEW Take 2 every 15 minutes for blood sugar <70mg/dL. Recheck blood sugar every 15 minutes until above  70mg/dL, then eat a substantial meal. 20 tablet 1     order for DME Hold Novolog if meals are refused. 1 each 0     Allergies   Allergen Reactions     Imidazole Antifungals Hives     Tolerates diflucan     Ketoprofen Itching     Pruritis to topical     Lisinopril Hives     Metformin Other (See Comments)     Patient hospitalized for lactic acidosis - admitting provider suspectd caused by metformin     Metronidazole Hives     Posaconazole Hives     Tolerates diflucan     Recent Labs   Lab Test  02/12/18   1408  02/08/18   2155  01/23/18   1526   01/13/18   2315  12/09/17   0748   11/07/17   0801   06/27/17   1358   12/29/16   0909   07/13/16   1350   07/14/15   1215   09/03/13   1156   A1C  6.8*   --    --    --    --   8.9*   --   8.9*   < >  7.0*   < >   --    < >   --    < >  9.1*   < >  10.8*   LDL   --    --    --    --    --    --    --    --    --   64   --    --    --   137*   --   78   < >  90   HDL   --    --    --    --    --    --    --    --    --   37*   --    --    --    --    --   39*   --   37*   TRIG   --    --    --    --    --    --    --    --    --   267*   --    --    --    --    --   151*   --   171*   ALT   --   23   --    --   29   --    --   23   < >  32   < >  26   < >   --    < >   --    < >  38   CR   --   1.09*  0.91   < >  1.62*   --    < >  0.88   < >  0.78   < >  0.72   < >   --    < >  0.67   < >  0.54   GFRESTIMATED   --   52*  64   < >  33*   --    < >  66   < >  76   < >  83   < >   --    < >  >90  Non  GFR Calc     < >  >90   GFRESTBLACK   --   63  77   < >  40*   --    < >  80   < >  >90   GFR Calc     < >  >90   GFR Calc     < >   --    < >  >90   GFR Calc     < >  >90   POTASSIUM   --   4.3  4.1   --   4.0   --    < >  3.9   < >  4.3   < >  4.2   < >   --    < >  4.3   < >  4.4   TSH   --    --    --    --    --    --    --   3.21   --    --    --   2.24   < >   --    < >   --    < >  1.42    < > = values in this  interval not displayed.      BP Readings from Last 3 Encounters:   02/12/18 110/62   02/09/18 161/84   01/23/18 130/80    Wt Readings from Last 3 Encounters:   02/12/18 228 lb 8 oz (103.6 kg)   01/23/18 226 lb 8 oz (102.7 kg)   01/18/18 219 lb 4.8 oz (99.5 kg)        Labs reviewed in EPIC  Problem list, Medication list, Allergies, and Medical/Social/Surgical histories reviewed in [facility] and updated as appropriate.     ROS: Constitutional, neuro, ENT, endocrine, pulmonary, cardiac, gastrointestinal, genitourinary, musculoskeletal, integument and psychiatric systems are negative, except as otherwise noted above in the HPI.     OBJECTIVE:                                                    /72 (BP Location: Right arm)  Pulse 83  Temp 98.2  F (36.8  C) (Oral)  Resp 14  Wt 236 lb (107 kg)  LMP 01/06/2015  SpO2 97%  BMI 45.94 kg/m2  Body mass index is 45.94 kg/(m^2).  GENERAL: healthy, alert, well nourished, well hydrated, no distress  EYES: Eyes grossly normal to inspection, extraocular movements - intact, and PERRL  HENT: ear canals- normal; TMs- normal; Nose- normal; Mouth- no ulcers, no lesions  NECK: no tenderness, no adenopathy, no asymmetry, no masses, no stiffness; thyroid- normal to palpation  RESP: lungs clear to auscultation - no rales, no rhonchi, no wheezes  CV: regular rates and rhythm, normal S1 S2, no S3 or S4 and no murmur, no click or rub  ABDOMEN: soft, no tenderness,  normal bowel sounds  MS: extremities- no gross deformities noted, 1+ edema bilateral ankles.   SKIN: no suspicious lesions, no rashes  NEURO: strength and tone- normal, sensory exam- grossly normal, mentation- intact, speech- normal, Non focal no aphasia. No facial asymmetry.   BACK: no CVA tenderness, no paralumbar tenderness  LYMPHATICS: ant. cervical- normal, post. cervical- normal, supraclavicular- normal.   Mental Status Assessment:  Appearance:   Appropriate   Eye Contact:   Good   Psychomotor Behavior: Normal    Attitude:   Cooperative   Orientation:   All  Speech   Rate / Production: Normal    Volume:  Normal   Mood:    Depressed  Sad   Affect:    Appropriate   Thought Content:  Clear   Thought Form:  Coherent  Logical   Insight:    Fair   Attention Span and Concentration: appropriate  Recent and Remote Memory:  intact  Fund of Knowledge: appropriate  Muscle Strength and Tone: normal   Suicidal Ideation: reports thoughts, no intention  Hallucination: Yes  Paranoid-No  Manic-No  Panic-No  Self harm-No    See [facility] notes     Diagnostic Test Results:  none      ASSESSMENT/PLAN:                                                    (E11.3299,  Z79.4) 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   Plan: insulin glargine (LANTUS) 100 UNIT/ML injection        Increased Lantus to 48 units daily.       (E55.9) Vitamin D deficiency  Comment: Last Vitamin D level at 69 (11/7/2017)  Plan: vitamin D3 (CHOLECALCIFEROL) 66034 UNITS         capsule        Changed vitamin D dose to monthly.       (M81.8) Other osteoporosis without current pathological fracture  Comment: Dexa Scan done 6/28/2017  FINDINGS:               Lumbar Spine (L1-L4) T-score:  -2.6, degenerative changes present               Left Femoral Neck T-score:  -2.1               Right Femoral Neck   T-score:  -2.6                             Lumbar (L1-L4) BMD: 0.876                                                           Total Hip Mean BMD: 0.914     IMPRESSION  Osteoporosis, Degenerative changes of the lumbar spine which may falsely elevate results.  Plan: calcium carbonate (OS-ALLEN 600 MG Yocha Dehe. CA) 1500        (600 CA) MG tablet        Continue with Vitamin D monthly.       (F25.1) Schizoaffective disorder, depressive type (H)  Comment: noted above. Patient continues to have visual and auditory hallucinations.  Reports that the auditory hallucinations are getting louder with her thoughts on death in regards to her sister.   Plan: continue with paliperidone daily and follow up with Dr. Pedersen- psychiatry as scheduled or sooner if needed.       Patient Instructions   -Follow-up with Richardson (therapy) on Monday.   -Increase Lantus to 48 units daily.   -Vitamin D changed to Monthly.   -Start taking calcium tablet daily.     I spent 25 min spent in direct face to face time with Nena Tang, greater than 50% in counseling and coordination of care for:  Diabetes, osteoporosis, depression and anxiety.      ART Fay CNP  [facility]

## 2018-03-13 NOTE — PROGRESS NOTES
[facility] - Integrated Primary Care   March 14, 2018      Behavioral Health Clinician Progress Note    Patient Name: Nena Tang           Service Type:  Individual      Service Location:   Face to Face in Clinic     Session Start Time: 10:00 a.m.  Session End Time: 10:45 a.m.      Session Length: 38 - 52      Attendees: Patient, PCP and MTM    Visit Activities (Refresh list every visit): [facility] and [facility]    Diagnostic Assessment Date: 1-23-18  Treatment Plan Review Date: Not completed yet  See Flowsheets for today's PHQ-9 and TAMIA-7 results  Previous PHQ-9:   PHQ-9 SCORE 1/2/2017 2/3/2017 3/13/2018   Total Score - - -   Total Score - - -   Total Score 0 0 14     Previous TAMIA-7:   TAMIA-7 SCORE 1/2/2017 2/3/2017 3/13/2018   Total Score - - -   Total Score 0 0 17   Total Score - - -       ALEXANDRA LEVEL:  ALEXANDRA Score (Last Two) 8/30/2011 6/9/2014   ALEXANDRA Raw Score 42 37   Activation Score 66 49.9   ALEXANDRA Level 3 2       DATA  Extended Session (60+ minutes): No  Interactive Complexity: No  Crisis: No  [facility] Patient: No    Treatment Objective(s) Addressed in This Session:  Target Behavior(s): disease management/lifestyle changes mental health    Depressed Mood: Increase interest, engagement, and pleasure in doing things  Decrease frequency and intensity of feeling down, depressed, hopeless  Improve quantity and quality of night time sleep / decrease daytime naps  Feel less tired and more energy during the day   Identify negative self-talk and behaviors: challenge core beliefs, myths, and actions  Improve concentration, focus, and mindfulness in daily activities   Decrease thoughts that you'd be better off dead or of suicide / self-harm  Anxiety: will experience a reduction in anxiety, will develop more effective coping skills to manage anxiety symptoms, will develop healthy cognitive patterns and beliefs and will increase ability to function adaptively  Alcohol / Substance Use: continue to make healthy choices  "regarding substance use and engage in activities / supportive services that promote sobriety  Thought Disturbance: will develop skills to more effectively manage symptoms, will develop more effective support systems and will continue to take medications as prescribed    Current Stressors / Issues:  [facility] co-visit with patient, PCP, and MTM in the clinic. Patient is tearful throughout the visit as her sister continues to suffer with lung cancer. She is participating in palliative chemotherapy, as the cancer is terminal. Patient returned from visiting her in [place] last week, and is planning a return trip soon. She is connecting with her son and grandchildren, and has another sister who lives in [place]. \"My sister here is helpful, but she thinks I'm taking all of this too hard. I don't know what I'll do when she passes. I'll probably have to go to the hospital\". Patient reports that auditory and visual hallucinations are becoming more intense, \"The man is getting closer. I can tell him to back off, but it's getting harder to tell the voices to be quiet. They know my weakness and play on it.\" When asked what her weakness is, she said, \"Death\". She said she feels like giving up at times, and that the lack of motivation is fueled by grief. Patient endorsed passive SI, denied intent or plan. Patient's appetite is reduced; she sits for three meals each day and eats as much as she can. The foster care provider and other residents know what's going on and are supportive, checking in and keeping tabs. She will let the foster care provider know if SI increases to having specific thoughts/plan, and will call 911 to present to the ED. Patient will keep a photo of her granddaughter close, as motivation to practice self-care.  Patient is going to the drop-in center daily for distraction. She will schedule with her therapist at the drop-in center and will see Richardson [facility], next week. At the end of the visit, patient asked whether a " "person can OD on insulin. She became tearful when asked why she wanted to know and said, \"I don't know. I was just wondering\". MTM and PCP advised patient.    Progress on Treatment Objective(s) / Homework:  Minimal progress - PREPARATION (Decided to change - considering how); Intervened by negotiating a change plan and determining options / strategies for behavior change, identifying triggers, exploring social supports, and working towards setting a date to begin behavior change    Motivational Interviewing    MI Intervention: Expressed Empathy/Understanding, Supported Autonomy, Collaboration, Evocation, Permission to raise concern or advise, Open-ended questions, Reflections: simple and complex, Rolled with resistance: Emphasized patient autonomy, Simple reflection and Evoked patient agenda and Change talk (evoked)     Change Talk Expressed by the Patient: Desire to change Ability to change Reasons to change Need to change Activation Taking steps    Provider Response to Change Talk: E - Evoked more info from patient about behavior change, A - Affirmed patient's thoughts, decisions, or attempts at behavior change, R - Reflected patient's change talk and S - Summarized patient's change talk statements      Care Plan review completed: No    Medication Review:  No changes to current psychiatric medication(s) - anti-depressant stopped in the hospital at the end of January. Patient will follow up with psychiatry in one month.     Medication Compliance:  NA    Changes in Health Issues:   None reported  Please see medical note by PCP ART Lobo, CNP    Chemical Use Review:   Substance Use: Chemical use reviewed, no active concerns identified      Tobacco Use: No current tobacco use.      Assessment: Current Emotional / Mental Status (status of significant symptoms):  Risk status (Self / Other harm or suicidal ideation)  Patient has had a history of suicidal ideation: yes  Patient denies current fears or concerns " for personal safety.  Patient denies current or recent suicidal ideation or behaviors.  Patient denies current or recent homicidal ideation or behaviors.  Patient denies current or recent self injurious behavior or ideation.  Patient denies other safety concerns.  A safety and risk management plan has not been developed at this time, however patient was encouraged to call [place] [phone] should there be a change in any of these risk factors.    Appearance:   Appropriate   Eye Contact:   Good   Psychomotor Behavior: Agitated   Attitude:   Cooperative   Orientation:   All  Speech   Rate / Production: Normal    Volume:  Normal   Mood:    Depressed  Sad   Affect:    Labile  Tearful   Thought Content:  Clear   Thought Form:  Coherent   Insight:    Fair     Diagnoses:  1. Schizoaffective disorder, depressive type (H)    2. Posttraumatic stress disorder        Collateral Reports Completed:  Not Applicable    Plan: (Homework, other):  Patient was given information about behavioral services and encouraged to schedule a follow up appointment with the clinic [facility] in 1 week.  She was also given information about mental health symptoms and treatment options .  CD Recommendations: Maintain Sobriety.    BIN Lee, [facility]      ______________________________________________________________________

## 2018-03-13 NOTE — NURSING NOTE
"Chief Complaint   Patient presents with     Anxiety     Depression       Initial /72 (BP Location: Right arm)  Pulse 83  Temp 98.2  F (36.8  C) (Oral)  Resp 14  Wt 236 lb (107 kg)  LMP 01/06/2015  SpO2 97%  BMI 45.94 kg/m2 Estimated body mass index is 45.94 kg/(m^2) as calculated from the following:    Height as of 1/14/18: 5' 0.1\" (1.527 m).    Weight as of this encounter: 236 lb (107 kg).  Medication Reconciliation: complete     Karla Vargas CMA      "

## 2018-03-13 NOTE — LETTER
My Depression Action Plan  Name: Nena Tang   Date of Birth 1960  Date: 3/13/2018    My doctor: Rowena Haas   My clinic: [facility] [place] [facility]  [address] [place] [address]  [address]  [facility] [address]  [phone]          GREEN    ZONE   Good Control    What it looks like:     Things are going generally well. You have normal up s and down s. You may even feel depressed from time to time, but bad moods usually last less than a day.   What you need to do:  1. Continue to care for yourself (see self care plan)  2. Check your depression survival kit and update it as needed  3. Follow your physician s recommendations including any medication.  4. Do not stop taking medication unless you consult with your physician first.           YELLOW         ZONE Getting Worse    What it looks like:     Depression is starting to interfere with your life.     It may be hard to get out of bed; you may be starting to isolate yourself from others.    Symptoms of depression are starting to last most all day and this has happened for several days.     You may have suicidal thoughts but they are not constant.   What you need to do:     1. Call your care team, your response to treatment will improve if you keep your care team informed of your progress. Yellow periods are signs an adjustment may need to be made.     2. Continue your self-care, even if you have to fake it!    3. Talk to someone in your support network    4. Open up your depression survival kit           RED    ZONE Medical Alert - Get Help    What it looks like:     Depression is seriously interfering with your life.     You may experience these or other symptoms: You can t get out of bed most days, can t work or engage in other necessary activities, you have trouble taking care of basic hygiene, or basic responsibilities, thoughts of suicide or death that will not go away, self-injurious behavior.     What you need to  do:  1. Call your care team and request a same-day appointment. If they are not available (weekends or after hours) call your local crisis line, emergency room or 911.      Electronically signed by: Karla Vargas, March 13, 2018    Depression Self Care Plan / Survival Kit    Self-Care for Depression  Here s the deal. Your body and mind are really not as separate as most people think.  What you do and think affects how you feel and how you feel influences what you do and think. This means if you do things that people who feel good do, it will help you feel better.  Sometimes this is all it takes.  There is also a place for medication and therapy depending on how severe your depression is, so be sure to consult with your medical provider and/ or Behavioral Health Consultant if your symptoms are worsening or not improving.     In order to better manage my stress, I will:    Exercise  Get some form of exercise, every day. This will help reduce pain and release endorphins, the  feel good  chemicals in your brain. This is almost as good as taking antidepressants!  This is not the same as joining a gym and then never going! (they count on that by the way ) It can be as simple as just going for a walk or doing some gardening, anything that will get you moving.      Hygiene   Maintain good hygiene (Get out of bed in the morning, Make your bed, Brush your teeth, Take a shower, and Get dressed like you were going to work, even if you are unemployed).  If your clothes don't fit try to get ones that do.    Diet  I will strive to eat foods that are good for me, drink plenty of water, and avoid excessive sugar, caffeine, alcohol, and other mood-altering substances.  Some foods that are helpful in depression are: complex carbohydrates, B vitamins, flaxseed, fish or fish oil, fresh fruits and vegetables.    Psychotherapy  I agree to participate in Individual Therapy (if recommended).    Medication  If prescribed medications, I agree  to take them.  Missing doses can result in serious side effects.  I understand that drinking alcohol, or other illicit drug use, may cause potential side effects.  I will not stop my medication abruptly without first discussing it with my provider.    Staying Connected With Others  I will stay in touch with my friends, family members, and my primary care provider/team.    Use your imagination  Be creative.  We all have a creative side; it doesn t matter if it s oil painting, sand castles, or mud pies! This will also kick up the endorphins.    Witness Beauty  (AKA stop and smell the roses) Take a look outside, even in mid-winter. Notice colors, textures. Watch the squirrels and birds.     Service to others  Be of service to others.  There is always someone else in need.  By helping others we can  get out of ourselves  and remember the really important things.  This also provides opportunities for practicing all the other parts of the program.    Humor  Laugh and be silly!  Adjust your TV habits for less news and crime-drama and more comedy.    Control your stress  Try breathing deep, massage therapy, biofeedback, and meditation. Find time to relax each day.     My support system    Clinic Contact:  Phone number:    Contact 1:  Phone number:    Contact 2:  Phone number:    Faith/:  Phone number:    Therapist:  Phone number:    Local crisis center:    Phone number:    Other community support:  Phone number:

## 2018-03-13 NOTE — MR AVS SNAPSHOT
After Visit Summary   3/13/2018    Nena Tang    MRN: [id number]           Patient Information     Date Of Birth          1960        Visit Information        Provider Department      3/13/2018 10:00 AM Riaz Montenegro, [facility] [place]        Today's Diagnoses     Schizoaffective disorder, depressive type (H)    -  1    Posttraumatic stress disorder           Follow-ups after your visit        Your next 10 appointments already scheduled     Mar 20, 2018  1:30 PM CDT   Return Visit with Richardson Lopez, [facility] ([facility] [place])    [address]  [address]  [facility] [address] [phone]            Mar 23, 2018  4:00 PM CDT   Return Visit with Kraig Pedersen [place]   [facility] [place] [place]    [address]  [facility] [id number] [phone]            Apr 17, 2018  2:00 PM CDT   Return Visit with ART Arias [facility] [place]    [address]  [address]  [facility] [id number] [phone]            Apr 17, 2018  2:00 PM CDT   Return Visit with Richardson Lopez, [facility] ([facility] [place])    [address]  [address]  [facility] [id number] [phone]            Apr 17, 2018  2:00 PM CDT   SHORT with Eugenia De Jesus [facility] [place] ([facility])    [address] [place] [address]  [address]  [facility] [address] [phone]              Who to contact     If you have questions or need follow up information about today's clinic visit or your schedule please contact [facility] directly at [phone].  Normal or non-critical lab and imaging results will  be communicated to you by [employer], letter or phone within 4 business days after the clinic has received the results. If you do not hear from us within 7 days, please contact the clinic through [employer] or phone. If you have a critical or abnormal lab result, we will notify you by phone as soon as possible.  Submit refill requests through [employer] or call your pharmacy and they will forward the refill request to us. Please allow 3 business days for your refill to be completed.          Additional Information About Your Visit        [employer] Information     [employer] gives you secure access to your electronic health record. If you see a primary care provider, you can also send messages to your care team and make appointments. If you have questions, please call your primary care clinic.  If you do not have a primary care provider, please call [phone] and they will assist you.        Care EveryWhere ID     This is your Care EveryWhere ID. This could be used by other organizations to access your [place] medical records  [id number]        Your Vitals Were     Last Period                   01/06/2015            Blood Pressure from Last 3 Encounters:   03/13/18 112/72   02/12/18 110/62   02/09/18 161/84    Weight from Last 3 Encounters:   03/13/18 236 lb (107 kg)   02/12/18 228 lb 8 oz (103.6 kg)   01/23/18 226 lb 8 oz (102.7 kg)              Today, you had the following     No orders found for display         Today's Medication Changes          These changes are accurate as of 3/13/18 11:59 PM.  If you have any questions, ask your nurse or doctor.               Start taking these medicines.        Dose/Directions    calcium carbonate 1500 (600 CA) MG tablet   Commonly known as:  OS-ALLEN 600 mg [place]. [place]   Used for:  Other osteoporosis without current pathological fracture   Started by:  Rowena Haas, APRN CNP        Dose:  1500 mg   Take 1 tablet (1,500 mg) by mouth daily   Quantity:  180 tablet   Refills:  3          These medicines have changed or have updated prescriptions.        Dose/Directions    insulin glargine 100 UNIT/ML injection   Commonly known as:  LANTUS   This may have changed:  how much to take   Used for:  Type 2 diabetes mellitus with mild nonproliferative retinopathy without macular edema, with long-term current use of insulin, unspecified laterality (H)   Changed by:  Rowena Haas APRN CNP        Dose:  48 Units   Inject 48 Units Subcutaneous At Bedtime   Quantity:  3 mL   Refills:  11       insulin pen needle 30G X 8 MM   Commonly known as:  NOVOFINE 30   This may have changed:  See the new instructions.   Used for:  Type 2 diabetes mellitus with mild nonproliferative retinopathy without macular edema, with long-term current use of insulin, unspecified laterality (H)   Changed by:  Rowena Haas APRN CNP        USE 4 DAILY OR AS DIRECTED   Quantity:  400 each   Refills:  0       vitamin D3 69264 UNITS capsule   Commonly known as:  CHOLECALCIFEROL   This may have changed:  additional instructions   Used for:  Vitamin D deficiency   Changed by:  Rowena Haas APRN CNP        TAKE 1 CAPSULE (50,000 UNITS) MONTHLY   Quantity:  12 capsule   Refills:  1            Where to get your medicines      These medications were sent to [address] [address]     Phone:  [phone]     calcium carbonate 1500 (600 CA) MG tablet    insulin glargine 100 UNIT/ML injection    insulin pen needle 30G X 8 MM    vitamin D3 40727 UNITS capsule                Primary Care Provider Office Phone # Fax #    ART Arias [phone]       [address] [address]  [facility] [address]        Goals        General    Medical (pt-stated)     Notes - Note created  7/7/2016  9:15 AM by Chelsea Pulido RN    Get blood sugars under control    Improve medication compliance    As of today's date 7/7/2016  goal is met at 0 - 25%.   Goal Status:  Active          Equal Access to Services     RAMESH RINCONCHEYANNE : Hadii samira ku tonny Rios, shamikada davidelijah, devante abreu lileonardo, lorena phuongin hayaafredi jeffersonmonica johnson la'xiomarafredi . So [facility] [phone].    ATENCIÓN: Si habla español, tiene a trinh disposición servicios gratuitos de asistencia lingüística. [facility] [phone].    We comply with applicable federal civil rights laws and [place] laws. We do not discriminate on the basis of race, color, national origin, age, disability, sex, sexual orientation, or gender identity.            Thank you!     Thank you for choosing [facility]  for your care. Our goal is always to provide you with excellent care. Hearing back from our patients is one way we can continue to improve our services. Please take a few minutes to complete the written survey that you may receive in the mail after your visit with us. Thank you!             Your Updated Medication List - Protect others around you: Learn how to safely use, store and throw away your medicines at www.disposemymeds.org.          This list is accurate as of 3/13/18 11:59 PM.  Always use your most recent med list.                   Brand Name Dispense Instructions for use Diagnosis    * ACETAMINOPHEN PO      Take 1,000 mg by mouth every 6 hours as needed for pain or fever        * acetaminophen 500 MG tablet    TYLENOL    100 tablet    Take 2 tablets (1,000 mg) by mouth 3 times daily as needed for mild pain    Chronic low back pain, unspecified back pain laterality, with sciatica presence unspecified       albuterol 108 (90 BASE) MCG/ACT Inhaler    PROAIR HFA/PROVENTIL HFA/VENTOLIN HFA    3 Inhaler    Inhale 2 puffs into the lungs every 6 hours as needed for shortness of breath / dyspnea or wheezing    Dyspnea on exertion       aspirin 81 MG EC tablet    ASPIRIN LOW DOSE    100 tablet    Take 1 tablet (81 mg) by mouth daily    Coronary artery disease  involving native heart with angina pectoris, unspecified vessel or lesion type (H)       atorvastatin 80 MG tablet    LIPITOR    28 tablet    TAKE 1 TABLET (80MG) BY MOUTH DAILY    Hyperlipidemia LDL goal <100       blood glucose monitoring lancets     150 each    Use to test blood sugar 2 times daily or as directed.  Ok to substitute alternative if insurance prefers.    Type 2 diabetes mellitus with diabetic neuropathy, with long-term current use of insulin (H)       blood glucose monitoring test strip    ONETOUCH VERIO IQ    200 strip    Use to test blood sugar 4 times daily .  Ok to substitute alternative if insurance prefers.    Type 2 diabetes mellitus with diabetic neuropathy, with long-term current use of insulin (H)       calcium carbonate 1500 (600 CA) MG tablet    OS-ALLEN 600 mg [place]. Ca    180 tablet    Take 1 tablet (1,500 mg) by mouth daily    Other osteoporosis without current pathological fracture       clotrimazole 1 % cream    LOTRIMIN     Apply topically 2 times daily        DIPHENDRYL PO      Take 25 mg by mouth every 6 hours as needed for itching        gabapentin 300 MG capsule    NEURONTIN    168 capsule    TAKE 2 CAPSULES (600MG) BY MOUTH THREE TIMES A DAY    Type 2 diabetes mellitus with diabetic neuropathy, with long-term current use of insulin (H)       glucose 4 G Chew chewable tablet     20 tablet    Take 2 every 15 minutes for blood sugar <70mg/dL. Recheck blood sugar every 15 minutes until above 70mg/dL, then eat a substantial meal.    Type 2 diabetes mellitus with mild nonproliferative retinopathy without macular edema, with long-term current use of insulin, unspecified laterality (H)       ibuprofen 600 MG tablet    ADVIL/MOTRIN    60 tablet    Take 1 tablet (600 mg) by mouth every 4 hours as needed for moderate pain    Closed fracture of one rib of right side, initial encounter       insulin aspart 100 UNIT/ML injection    NovoLOG FLEXPEN    15 mL    Inject 20 Units Subcutaneous 3  times daily (with meals) Once daily, can add additional 5 units if BGs are >500mg/dL.    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, with long-term current use of insulin, unspecified laterality (H)       insulin pen needle 30G X 8 MM    NOVOFINE 30    400 each    USE 4 DAILY OR AS DIRECTED    Type 2 diabetes mellitus with mild nonproliferative retinopathy without macular edema, with long-term current use of insulin, unspecified laterality (H)       losartan 100 MG tablet    COZAAR    28 tablet    TAKE 1 TABLET (100MG) BY MOUTH DAILY    Coronary artery disease involving native heart with angina pectoris, unspecified vessel or lesion type (H)       melatonin 5 MG Caps     180 capsule    Take 2 capsules by mouth daily At dinnertime    Insomnia, unspecified type       metoprolol succinate 100 MG 24 hr tablet    TOPROL-XL    28 tablet    TAKE 1 TABLET (100MG) BY MOUTH DAILY    Coronary artery disease involving native heart with angina pectoris, unspecified vessel or lesion type (H)       * order for DME     1 each    Hold Novolog if meals are refused.    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, with long-term current use of insulin, unspecified laterality (H)       paliperidone 9 MG 24 hr tablet    INVEGA    30 tablet    Take 1 tablet (9 mg) by mouth every morning    Schizoaffective disorder, bipolar type (H)       polyethylene glycol powder    MIRALAX/GLYCOLAX    527 g     TAKE 17 GRAMS (1 CAPFUL) BY MOUTH DAILY    Constipation, unspecified constipation type       prochlorperazine 5 MG tablet    COMPAZINE    90 tablet    Take 1 tablet (5 mg) by mouth every 6 hours as needed for nausea or vomiting    Nausea       ranitidine 300 MG tablet    ZANTAC    90 tablet    TAKE 1 TABLET (300MG) BY MOUTH AT BEDTIME    Gastroesophageal reflux disease without esophagitis       senna-docusate 8.6-50 MG per tablet    SENOKOT-S;PERICOLACE     Take 1 tablet by mouth 2 times daily as needed for constipation        SUMAtriptan 25 MG tablet    IMITREX    12 tablet    Take 1-2 tablets (25-50 mg) by mouth at onset of headache for migraine May repeat in 2 hours. Max 8 tablets/24 hours.    Migraine with aura and without status migrainosus, not intractable       trihexyphenidyl 2 MG tablet    ARTANE    30 tablet    Take 1 tablet (2 mg) by mouth daily    Schizoaffective disorder, bipolar type (H), Extrapyramidal and movement disorder       TRULICITY 1.5 MG/0.5ML pen   Generic drug:  dulaglutide     2 mL    INJECT 1.5MG SUBCUTANEOUSLY EVERY SEVEN DAYS    Type 2 diabetes mellitus with mild nonproliferative retinopathy without macular edema, with long-term current use of insulin, unspecified laterality (H)       vitamin D3 77589 UNITS capsule    CHOLECALCIFEROL    12 capsule    TAKE 1 CAPSULE (50,000 UNITS) MONTHLY    Vitamin D deficiency       * Notice:  This list has 4 medication(s) that are the same as other medications prescribed for you. Read the directions carefully, and ask your doctor or other care provider to review them with you.

## 2018-03-13 NOTE — PATIENT INSTRUCTIONS
-Follow-up with Richardson (therapy) on Monday.   -Increase Lantus to 48 units daily.   -Vitamin D changed to Monthly.   -Start taking calcium tablet daily.

## 2018-03-13 NOTE — MR AVS SNAPSHOT
After Visit Summary   3/13/2018    Nena Tang    MRN: [id number]           Patient Information     Date Of Birth          1960        Visit Information        Provider Department      3/13/2018 10:00 AM Rowena Haas APRN CNP [facility]        Today's Diagnoses     Screening for malignant neoplasm of cervix    -  1    Type 2 diabetes mellitus with mild nonproliferative retinopathy without macular edema, with long-term current use of insulin, unspecified laterality (H)        Vitamin D deficiency        Other osteoporosis without current pathological fracture          Care Instructions    -Follow-up with Richardson (therapy) on Monday.   -Increase Lantus to 48 units daily.   -Vitamin D changed to Monthly.   -Start taking calcium tablet daily.           Follow-ups after your visit        Your next 10 appointments already scheduled     Mar 19, 2018 11:00 AM CDT   [facility]    [address]  [address]  [facility] [address] [phone]            Apr 13, 2018  4:00 PM CDT   Return Visit with Kraig Pedersen MD   [facility] ([facility])    [address]  [facility] [address] [phone]              Who to contact     If you have questions or need follow up information about today's clinic visit or your schedule please contact [facility] directly at [phone].  Normal or non-critical lab and imaging results will be communicated to you by MyChart, letter or phone within 4 business days after the clinic has received the results. If you do not hear from us within 7 days, please contact the clinic through MyChart or phone. If you have a critical or abnormal lab result, we will notify you by phone as soon as possible.  Submit refill requests through  [employer] or call your pharmacy and they will forward the refill request to us. Please allow 3 business days for your refill to be completed.          Additional Information About Your Visit        [employer] Information     [employer] gives you secure access to your electronic health record. If you see a primary care provider, you can also send messages to your care team and make appointments. If you have questions, please call your primary care clinic.  If you do not have a primary care provider, please call [phone] and they will assist you.        Care EveryWhere ID     This is your Care EveryWhere ID. This could be used by other organizations to access your [facility] medical records  [id number]        Your Vitals Were     Pulse Temperature Respirations Last Period Pulse Oximetry BMI (Body Mass Index)    83 98.2  F (36.8  C) (Oral) 14 01/06/2015 97% 45.94 kg/m2       Blood Pressure from Last 3 Encounters:   03/13/18 112/72   02/12/18 110/62   02/09/18 161/84    Weight from Last 3 Encounters:   03/13/18 236 lb (107 kg)   02/12/18 228 lb 8 oz (103.6 kg)   01/23/18 226 lb 8 oz (102.7 kg)              Today, you had the following     No orders found for display         Today's Medication Changes          These changes are accurate as of 3/13/18 10:59 AM.  If you have any questions, ask your nurse or doctor.               Start taking these medicines.        Dose/Directions    calcium carbonate 1500 (600 CA) MG tablet   Commonly known as:  OS-ALLEN 600 mg [place]. [place]   Used for:  Other osteoporosis without current pathological fracture   Started by:  Rowena Haas APRN CNP        Dose:  1500 mg   Take 1 tablet (1,500 mg) by mouth daily   Quantity:  180 tablet   Refills:  3         These medicines have changed or have updated prescriptions.        Dose/Directions    insulin glargine 100 UNIT/ML injection   Commonly known as:  LANTUS   This may have changed:  how much to take   Used for:  Type 2 diabetes mellitus with mild  nonproliferative retinopathy without macular edema, with long-term current use of insulin, unspecified laterality (H)   Changed by:  Rowena Haas APRN CNP        Dose:  48 Units   Inject 48 Units Subcutaneous At Bedtime   Quantity:  3 mL   Refills:  11       vitamin D3 33005 UNITS capsule   Commonly known as:  CHOLECALCIFEROL   This may have changed:  additional instructions   Used for:  Vitamin D deficiency   Changed by:  Rowena Haas APRN CNP        TAKE 1 CAPSULE (50,000 UNITS) MONTHLY   Quantity:  12 capsule   Refills:  1            Where to get your medicines      These medications were sent to [address] [address]     Phone:  [phone]     calcium carbonate 1500 (600 CA) MG tablet    insulin glargine 100 UNIT/ML injection    vitamin D3 08816 UNITS capsule                Primary Care Provider Office Phone # Fax #    ART Arias [phone]       [address] [address]  [facility] [address]        Goals        General    Medical (pt-stated)     Notes - Note created  7/7/2016  9:15 AM by Chelsea Pulido RN    Get blood sugars under control    Improve medication compliance    As of today's date 7/7/2016 goal is met at 0 - 25%.   Goal Status:  Active          Equal Access to Services     [facility] AH: Hadii samira ku hadasho Soomaali, waaxda luqadaha, qaybta kaalmada adeegyada, lorena martins. So [facility] [phone].    ATENCIÓN: Si habla español, tiene a trinh disposición servicios gratuitos de asistencia lingüística. Llame al [phone].    We comply with applicable federal civil rights laws and [place] laws. We do not discriminate on the basis of race, color, national origin, age, disability, sex, sexual orientation, or gender identity.            Thank you!     Thank you for choosing [facility]  for your care. Our  goal is always to provide you with excellent care. Hearing back from our patients is one way we can continue to improve our services. Please take a few minutes to complete the written survey that you may receive in the mail after your visit with us. Thank you!             Your Updated Medication List - Protect others around you: Learn how to safely use, store and throw away your medicines at www.disposemymeds.org.          This list is accurate as of 3/13/18 10:59 AM.  Always use your most recent med list.                   Brand Name Dispense Instructions for use Diagnosis    * ACETAMINOPHEN PO      Take 1,000 mg by mouth every 6 hours as needed for pain or fever        * acetaminophen 500 MG tablet    TYLENOL    100 tablet    Take 2 tablets (1,000 mg) by mouth 3 times daily as needed for mild pain    Chronic low back pain, unspecified back pain laterality, with sciatica presence unspecified       albuterol 108 (90 BASE) MCG/ACT Inhaler    PROAIR HFA/PROVENTIL HFA/VENTOLIN HFA    3 Inhaler    Inhale 2 puffs into the lungs every 6 hours as needed for shortness of breath / dyspnea or wheezing    Dyspnea on exertion       aspirin 81 MG EC tablet    ASPIRIN LOW DOSE    100 tablet    Take 1 tablet (81 mg) by mouth daily    Coronary artery disease involving native heart with angina pectoris, unspecified vessel or lesion type (H)       atorvastatin 80 MG tablet    LIPITOR    28 tablet    TAKE 1 TABLET (80MG) BY MOUTH DAILY    Hyperlipidemia LDL goal <100       blood glucose monitoring lancets     150 each    Use to test blood sugar 2 times daily or as directed.  Ok to substitute alternative if insurance prefers.    Type 2 diabetes mellitus with diabetic neuropathy, with long-term current use of insulin (H)       blood glucose monitoring test strip    ONETOUCH VERIO IQ    200 strip    Use to test blood sugar 4 times daily .  Ok to substitute alternative if insurance prefers.    Type 2 diabetes mellitus with diabetic  neuropathy, with long-term current use of insulin (H)       calcium carbonate 1500 (600 CA) MG tablet    OS-ALLEN 600 mg [place]. Ca    180 tablet    Take 1 tablet (1,500 mg) by mouth daily    Other osteoporosis without current pathological fracture       clotrimazole 1 % cream    LOTRIMIN     Apply topically 2 times daily        DIPHENDRYL PO      Take 25 mg by mouth every 6 hours as needed for itching        gabapentin 300 MG capsule    NEURONTIN    168 capsule    TAKE 2 CAPSULES (600MG) BY MOUTH THREE TIMES A DAY    Type 2 diabetes mellitus with diabetic neuropathy, with long-term current use of insulin (H)       glucose 4 G Chew chewable tablet     20 tablet    Take 2 every 15 minutes for blood sugar <70mg/dL. Recheck blood sugar every 15 minutes until above 70mg/dL, then eat a substantial meal.    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, with long-term current use of insulin, unspecified laterality (H)       losartan 100 MG tablet    COZAAR    28 tablet    TAKE 1 TABLET (100MG) BY MOUTH DAILY    Coronary artery disease involving native heart with angina pectoris, unspecified  vessel or lesion type (H)       melatonin 5 MG Caps     180 capsule    Take 2 capsules by mouth daily At dinnertime    Insomnia, unspecified type       metoprolol succinate 100 MG 24 hr tablet    TOPROL-XL    28 tablet    TAKE 1 TABLET (100MG) BY MOUTH DAILY    Coronary artery disease involving native heart with angina pectoris, unspecified vessel or lesion type (H)       NOVOFINE 30 30G X 8 MM   Generic drug:  insulin pen needle     100 each    USE 4 DAILY OR AS DIRECTED    Type 2 diabetes mellitus with mild nonproliferative retinopathy without macular edema, with long-term current use of insulin, unspecified laterality (H)       * order for DME     1 each    Hold Novolog if meals are refused.    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, with long-term current use of insulin, unspecified laterality (H)       paliperidone 9 MG 24 hr tablet    INVEGA    30 tablet    Take 1 tablet (9 mg) by mouth every morning    Schizoaffective disorder, bipolar type (H)       polyethylene glycol powder    MIRALAX/GLYCOLAX    527 g    TAKE 17 GRAMS (1 CAPFUL) BY MOUTH DAILY    Constipation, unspecified constipation type       prochlorperazine 5 MG tablet    COMPAZINE    90 tablet    Take 1 tablet (5 mg) by mouth every 6 hours as needed for nausea or vomiting    Nausea       ranitidine 300 MG tablet    ZANTAC    90 tablet    TAKE 1 TABLET (300MG) BY MOUTH AT BEDTIME    Gastroesophageal reflux disease without esophagitis       senna-docusate 8.6-50 MG per tablet    SENOKOT-S;PERICOLACE     Take 1 tablet by mouth 2 times daily as needed for constipation        SUMAtriptan 25 MG tablet    IMITREX    12 tablet    Take 1-2 tablets (25-50 mg) by mouth at onset of headache for migraine May  repeat in 2 hours. Max 8 tablets/24 hours.    Migraine with aura and without status migrainosus, not intractable       trihexyphenidyl 2 MG tablet    ARTANE    30 tablet    Take 1 tablet (2 mg) by mouth daily    Schizoaffective disorder, bipolar type (H), Extrapyramidal and movement disorder       TRULICITY 1.5 MG/0.5ML pen   Generic drug:  dulaglutide     2 mL    INJECT 1.5MG SUBCUTANEOUSLY EVERY SEVEN DAYS    Type 2 diabetes mellitus with mild nonproliferative retinopathy without macular edema, with long-term current use of insulin, unspecified laterality (H)       vitamin D3 08225 UNITS capsule    CHOLECALCIFEROL    12 capsule    TAKE 1 CAPSULE (50,000 UNITS) MONTHLY    Vitamin D deficiency       * Notice:  This list has 4 medication(s) that are the same as other medications prescribed for you. Read the directions carefully, and ask your doctor or other care provider to review them with you.

## 2018-03-13 NOTE — TELEPHONE ENCOUNTER
Received fax request from pharmacy     Last Written Prescription Date:  1/15/2018  Last Fill Quantity: 100,   # refills: 1  Last Office Visit: 2/12/2018  Future Office visit:    Next 5 appointments (look out 90 days)     Mar 13, 2018 10:00 AM CDT   Return Visit with ART Arias CNP   [facility] ([facility])    [address]  [address]  [facility] [address]   [phone]            Mar 13, 2018 10:00 AM CDT   SHORT with Eugenia De Jesus [facility] ([facility])    [address] [place] [address]  [address]  [facility] [address]   [phone]            Mar 13, 2018 10:00 AM CDT   Return Visit with Riaz Montenegro [facility] ([facility])    [address]  [address]  [facility] [address]   [phone]            Apr 13, 2018  4:00 PM CDT   Return Visit with Kraig Pedersen MD   [facility] ([facility])    [address]  [facility] [address]   [phone]

## 2018-03-14 ENCOUNTER — TELEPHONE (OUTPATIENT)
Dept: FAMILY MEDICINE | Facility: CLINIC | Age: 58
End: 2018-03-14

## 2018-03-14 ASSESSMENT — ANXIETY QUESTIONNAIRES: GAD7 TOTAL SCORE: 17

## 2018-03-14 ASSESSMENT — PATIENT HEALTH QUESTIONNAIRE - PHQ9: SUM OF ALL RESPONSES TO PHQ QUESTIONS 1-9: 14

## 2018-03-14 NOTE — TELEPHONE ENCOUNTER
PHQ-9 SCORE 1/2/2017 2/3/2017 3/13/2018   Total Score - - -   Total Score - - -   Total Score 0 0 14     Writer notes recent increase in PHQ-9 - last office visit with [facility] 3/13/2018 - is experiencing auditory and visual hallucinations    +SI with last PHQ-9 - thoughts of self harm - contracted for safety in clinic at office visit       IP[facility] Reception - please call patient and offer earlier appointment with psychiatry - Dr. Pedersen per his request    Thank you,  Gaye Umana RN

## 2018-03-14 NOTE — TELEPHONE ENCOUNTER
Prescription approved per [facility] Refill Protocol.    Signed Prescriptions:                        Disp   Refills    insulin pen needle (NOVOFINE 30) 30G X 8  ea*0        Sig: USE 4 DAILY OR AS DIRECTED  Authorizing Provider: MICHAELLE CALIXTO  Ordering User: PATI GARCIA      Closing encounter - no further actions needed at this time    Pati Garcia RN

## 2018-03-14 NOTE — TELEPHONE ENCOUNTER
offer earlier appt with me  Received: Yesterday       Kraig Pedersen MD  P [facility] All Nurse Pool                   I got a note from the pharmacist who said that she was expressing depression and hopelessness. Pharmacist is suggesting starting an SSRI.     Can you please call her and offer her an earlier follow up with me so that I can discuss this with her?     Thanks,     NATHAN Zavala

## 2018-03-20 ENCOUNTER — OFFICE VISIT (OUTPATIENT)
Dept: BEHAVIORAL HEALTH | Facility: CLINIC | Age: 58
End: 2018-03-20
Payer: MEDICARE

## 2018-03-20 DIAGNOSIS — F25.1 SCHIZOAFFECTIVE DISORDER, DEPRESSIVE TYPE (H): Primary | ICD-10-CM

## 2018-03-20 DIAGNOSIS — F43.10 POSTTRAUMATIC STRESS DISORDER: ICD-10-CM

## 2018-03-20 PROCEDURE — 90834 PSYTX W PT 45 MINUTES: CPT | Performed by: SOCIAL WORKER

## 2018-03-20 NOTE — PROGRESS NOTES
[facility]   March 20, 2018      Behavioral Health Clinician Progress Note    Patient Name: Nena Tang           Service Type:  Individual      Service Location:   Face to Face in Clinic     Session Start Time: 1:30pm  Session End Time: 2:08pm      Session Length: 38 - 52      Attendees: Patient    Visit Activities (Refresh list every visit): [facility] Only    Diagnostic Assessment Date: 1-23-18  Treatment Plan Review Date: Not completed yet  See Flowsheets for today's PHQ-9 and TAMIA-7 results  Previous PHQ-9:   PHQ-9 SCORE 1/2/2017 2/3/2017 3/13/2018   Total Score - - -   Total Score - - -   Total Score 0 0 14     Previous TAMIA-7:   TAMIA-7 SCORE 1/2/2017 2/3/2017 3/13/2018   Total Score - - -   Total Score 0 0 17   Total Score - - -       ALEXANDRA LEVEL:  ALEXANDRA Score (Last Two) 8/30/2011 6/9/2014   ALEXANDRA Raw Score 42 37   Activation Score 66 49.9   ALEXANDRA Level 3 2       DATA  Extended Session (60+ minutes): No  Interactive Complexity: No  Crisis: No  [facility] Patient: No    Treatment Objective(s) Addressed in This Session:  Target Behavior(s): disease management/lifestyle changes mental health    Depressed Mood: Increase interest, engagement, and pleasure in doing things  Decrease frequency and intensity of feeling down, depressed, hopeless  Improve quantity and quality of night time sleep / decrease daytime naps  Feel less tired and more energy during the day   Identify negative self-talk and behaviors: challenge core beliefs, myths, and actions  Improve concentration, focus, and mindfulness in daily activities   Decrease thoughts that you'd be better off dead or of suicide / self-harm  Anxiety: will experience a reduction in anxiety, will develop more effective coping skills to manage anxiety symptoms, will develop healthy cognitive patterns and beliefs and will increase ability to function adaptively  Alcohol / Substance Use: continue to make healthy choices regarding substance use and engage in  activities / supportive services that promote sobriety  Thought Disturbance: will develop skills to more effectively manage symptoms, will develop more effective support systems and will continue to take medications as prescribed    Current Stressors / Issues:    The patient reported that she has been going to a drop in center for the past years and she enjoys going to the center-she has friends there-she goes the the drop in center daily-good social outlet-this writer attempted to have the patient talk about what is going well for her-she talked about her sister and she went to see her it went well sad-worried about her sister and her health issues-she thinks that her sister is holding back information about her true health issues-her condition is worse than how she is saying it is-you can't control her condition-how can you take care of you and do the best for your sister-I want to take it like my other sister but I can't-she is going to tell me that things happen-worried that she will become suicidal and stress-she does not do good with loss-things happen for a reason she will try to cheer me up and don't worry and take care of your self-she is good at cooking-you can share your  with the cooking that he taught her-suicidal thoughts with no plan and no intent and no plan-      Progress on Treatment Objective(s) / Homework:  Minimal progress - PREPARATION (Decided to change - considering how); Intervened by negotiating a change plan and determining options / strategies for behavior change, identifying triggers, exploring social supports, and working towards setting a date to begin behavior change    Motivational Interviewing    MI Intervention: Expressed Empathy/Understanding, Supported Autonomy, Collaboration, Evocation, Permission to raise concern or advise, Open-ended questions, Reflections: simple and complex, Rolled with resistance: Emphasized patient autonomy, Simple reflection and Evoked patient  agenda and Change talk (evoked)     Change Talk Expressed by the Patient: Desire to change Reasons to change Need to change Committment to change Activation Taking steps    Provider Response to Change Talk: E - Evoked more info from patient about behavior change, A - Affirmed patient's thoughts, decisions, or attempts at behavior change, R - Reflected patient's change talk and S - Summarized patient's change talk statements      Care Plan review completed: No    Medication Review:  No changes to current psychiatric medication(s) - anti-depressant stopped in the hospital at the end of January. Patient will follow up with psychiatry in one month.     Medication Compliance:  NA    Changes in Health Issues:   None reported     Chemical Use Review:   Substance Use: Chemical use reviewed, no active concerns identified      Tobacco Use: No current tobacco use.      Assessment: Current Emotional / Mental Status (status of significant symptoms):  Risk status (Self / Other harm or suicidal ideation)  Patient has had a history of suicidal ideation: yes  Patient denies current fears or concerns for personal safety.  Patient denies current or recent suicidal ideation or behaviors.  Patient denies current or recent homicidal ideation or behaviors.  Patient denies current or recent self injurious behavior or ideation.  Patient denies other safety concerns.  A safety and risk management plan has not been developed at this time, however patient was encouraged to call [place] [phone] should there be a change in any of these risk factors.    Appearance:   Appropriate   Eye Contact:   Good   Psychomotor Behavior: Agitated   Attitude:   Cooperative   Orientation:   All  Speech   Rate / Production: Normal    Volume:  Normal   Mood:    Depressed  Sad   Affect:    Labile  Tearful   Thought Content:  Clear   Thought Form:  Coherent   Insight:    Fair     Diagnoses:  1. Schizoaffective disorder, depressive type (H)    2. Posttraumatic  stress disorder        Collateral Reports Completed:  Not Applicable    Plan: (Homework, other):  Patient was given information about behavioral services and encouraged to schedule a follow up appointment with the clinic [facility] in 1 week.  She was also given information about mental health symptoms and treatment options .  CD Recommendations: Maintain Sobriety.    BIN Lee, [facility]      ______________________________________________________________________

## 2018-03-20 NOTE — MR AVS SNAPSHOT
After Visit Summary   3/20/2018    Nena Tang    MRN: [id number]           Patient Information     Date Of Birth          1960        Visit Information        Provider Department      3/20/2018 1:30 PM Richardson Lopez, [facility]        Today's Diagnoses     Schizoaffective disorder, depressive type (H)    -  1    Posttraumatic stress disorder           Follow-ups after your visit        Your next 10 appointments already scheduled     Mar 23, 2018  3:30 PM CDT   Return Visit with Richardson Lopez [facility] ([facility])    [address]  [address]  [facility] [id number] [phone]            Mar 23, 2018  4:00 PM CDT   Return Visit with Kraig Pedersen MD   [facility] ([facility])    [address]  [facility] [id number] [phone]            Apr 17, 2018  2:00 PM CDT   Return Visit with ART Arias [facility] ([facility])    [address]  [address]  [facility] [id number] [phone]            Apr 17, 2018  2:00 PM CDT   Return Visit with Richardson Lopez [facility] ([facility])    [address]  [address]  [facility] [id number] [phone]            Apr 17, 2018  2:00 PM CDT   SHORT with Eugenia De Jesus [facility] [place] ([facility])    [address] [facility]  [address]  [facility] [id number] [phone]              Who to contact     If you have questions or need follow up information about today's clinic visit or your schedule please contact [facility] directly at [phone].  Normal or non-critical lab and imaging results will  be communicated to you by MyChart, letter or phone within 4 business days after the clinic has received the results. If you do not hear from us within 7 days, please contact the clinic through Zadego or phone. If you have a critical or abnormal lab result, we will notify you by phone as soon as possible.  Submit refill requests through Zadego or call your pharmacy and they will forward the refill request to us. Please allow 3 business days for your refill to be completed.          Additional Information About Your Visit        Zadego Information     Zadego gives you secure access to your electronic health record. If you see a primary care provider, you can also send messages to your care team and make appointments. If you have questions, please call your primary care clinic.  If you do not have a primary care provider, please call [phone] and they will assist you.        Care EveryWhere ID     This is your Care EveryWhere ID. This could be used by other organizations to access your [place] medical records  [id number]        Your Vitals Were     Last Period                   01/06/2015            Blood Pressure from Last 3 Encounters:   03/13/18 112/72   02/12/18 110/62   02/09/18 161/84    Weight from Last 3 Encounters:   03/13/18 236 lb (107 kg)   02/12/18 228 lb 8 oz (103.6 kg)   01/23/18 226 lb 8 oz (102.7 kg)              Today, you had the following     No orders found for display       Primary Care Provider Office Phone # Fax #    Rowena ART Blanton [phone]       [address] [place] [address]  [facility] [id number]        Goals        General    Medical (pt-stated)     Notes - Note created  7/7/2016  9:15 AM by Chelsea Pulido, RN    Get blood sugars under control    Improve medication compliance    As of today's date 7/7/2016 goal is met at 0 - 25%.   Goal Status:  Active          Equal Access to Services     RAMESH GARRIDO : luis Peters,  devante abreu lilorena patterson ah. So [facility] [phone].    ATENCIÓN: Si mary alice estrada, tiene a trinh disposición servicios gratuitos de asistencia lingüística. Smith al [phone].    We comply with applicable federal civil rights laws and [place] laws. We do not discriminate on the basis of race, color, national origin, age, disability, sex, sexual orientation, or gender identity.            Thank you!     Thank you for choosing [facility]  for your care. Our goal is always to provide you with excellent care. Hearing back from our patients is one way we can continue to improve our services. Please take a few minutes to complete the written survey that you may receive in the mail after your visit with us. Thank you!             Your Updated Medication List - Protect others around you: Learn how to safely use, store and throw away your medicines at www.disposemymeds.org.          This list is accurate as of 3/20/18 11:59 PM.  Always use your most recent med list.                   Brand Name Dispense Instructions for use Diagnosis    * ACETAMINOPHEN PO      Take 1,000 mg by mouth every 6 hours as needed for pain or fever        * acetaminophen 500 MG tablet    TYLENOL    100 tablet    Take 2 tablets (1,000 mg) by mouth 3 times daily as needed for mild pain    Chronic low back pain, unspecified back pain laterality, with sciatica presence unspecified       albuterol 108 (90 BASE) MCG/ACT Inhaler    PROAIR HFA/PROVENTIL HFA/VENTOLIN HFA    3 Inhaler    Inhale 2 puffs into the lungs every 6 hours as needed for shortness of breath / dyspnea or wheezing    Dyspnea on exertion       aspirin 81 MG EC tablet     28 tablet    TAKE 1 TABLET (81MG) BY MOUTH DAILY    Coronary artery disease involving native heart with angina pectoris, unspecified vessel or lesion type (H)       atorvastatin 80 MG tablet    LIPITOR    28 tablet    TAKE 1 TABLET (80MG) BY MOUTH  DAILY    Hyperlipidemia LDL goal <100       blood glucose monitoring lancets     150 each    Use to test blood sugar 2 times daily or as directed.  Ok to substitute alternative if insurance prefers.    Type 2 diabetes mellitus with diabetic neuropathy, with long-term current use of insulin (H)       blood glucose monitoring test strip    ONETOUCH VERIO IQ    200 strip    Use to test blood sugar 4 times daily .  Ok to substitute alternative if insurance prefers.    Type 2 diabetes mellitus with diabetic neuropathy, with long-term current use of insulin (H)       calcium carbonate 1500 (600 CA) MG tablet    OS-ALLEN 600 mg [place]. Ca    180 tablet    Take 1 tablet (1,500 mg) by mouth daily    Other osteoporosis without current pathological fracture       clotrimazole 1 % cream    LOTRIMIN     Apply topically 2 times daily        DIPHENDRYL PO      Take 25 mg by mouth every 6 hours as needed for itching        gabapentin 300 MG capsule    NEURONTIN    168 capsule    TAKE 2 CAPSULES (600MG) BY MOUTH THREE TIMES A DAY    Type 2 diabetes mellitus with diabetic neuropathy, with long-term current use of insulin (H)       glucose 4 G Chew chewable tablet     20 tablet    Take 2 every 15 minutes for blood sugar <70mg/dL. Recheck blood sugar every 15 minutes until above 70mg/dL, then eat a substantial meal.    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    Type 2 diabetes mellitus with mild nonproliferative retinopathy without macular edema,  with long-term current use of insulin, unspecified laterality (H)       insulin glargine 100 UNIT/ML injection    LANTUS    3 mL    Inject 48 Units Subcutaneous At Bedtime    Type 2 diabetes mellitus with mild nonproliferative retinopathy without macular edema, with long-term current use of insulin, unspecified laterality (H)       insulin pen needle 30G X 8 MM    NOVOFINE 30    400 each    USE 4 DAILY OR AS DIRECTED    Type 2 diabetes mellitus with mild nonproliferative retinopathy without macular edema, with long-term current use of insulin, unspecified laterality (H)       losartan 100 MG tablet    COZAAR    28 tablet    TAKE 1 TABLET (100MG) BY MOUTH DAILY    Coronary artery disease involving native heart with angina pectoris, unspecified vessel or lesion type (H)       melatonin 5 MG Caps     180 capsule    Take 2 capsules by mouth daily At dinnertime    Insomnia, unspecified type       metoprolol succinate 100 MG 24 hr tablet    TOPROL-XL    28 tablet    TAKE 1 TABLET (100MG) BY MOUTH DAILY    Coronary artery disease involving native heart with angina pectoris, unspecified vessel or lesion type (H)       * order for DME     1 each    Hold Novolog if meals are refused.    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, with long-term current use of insulin, unspecified laterality (H)       paliperidone 9 MG 24 hr tablet    INVEGA    30 tablet    Take 1 tablet (9 mg) by mouth every morning    Schizoaffective disorder, bipolar type (H)       polyethylene glycol powder    MIRALAX/GLYCOLAX    527 g    TAKE 17 GRAMS (1 CAPFUL) BY MOUTH DAILY    Constipation, unspecified constipation type       prochlorperazine 5 MG tablet    COMPAZINE    90 tablet    Take 1 tablet (5 mg) by  mouth every 6 hours as needed for nausea or vomiting    Nausea       ranitidine 300 MG tablet    ZANTAC    90 tablet    TAKE 1 TABLET (300MG) BY MOUTH AT BEDTIME    Gastroesophageal reflux disease without esophagitis       senna-docusate 8.6-50 MG per tablet    SENOKOT-S;PERICOLACE     Take 1 tablet by mouth 2 times daily as needed for constipation        SUMAtriptan 25 MG tablet    IMITREX    12 tablet    Take 1-2 tablets (25-50 mg) by mouth at onset of headache for migraine May repeat in 2 hours. Max 8 tablets/24 hours.    Migraine with aura and without status migrainosus, not intractable       trihexyphenidyl 2 MG tablet    ARTANE    30 tablet    Take 1 tablet (2 mg) by mouth daily    Schizoaffective disorder, bipolar type (H), Extrapyramidal and movement disorder       TRULICITY 1.5 MG/0.5ML pen   Generic drug:  dulaglutide     2 mL    INJECT 1.5MG SUBCUTANEOUSLY EVERY SEVEN DAYS    Type 2 diabetes mellitus with mild nonproliferative retinopathy without macular edema, with long-term current use of insulin, unspecified laterality (H)       vitamin D3 32134 UNITS capsule    CHOLECALCIFEROL    12 capsule    TAKE 1 CAPSULE (50,000 UNITS) MONTHLY    Vitamin D deficiency       * Notice:  This list has 4 medication(s) that are the same as other medications prescribed for you. Read the directions carefully, and ask your doctor or other care provider to review them with you.

## 2018-03-23 ENCOUNTER — OFFICE VISIT (OUTPATIENT)
Dept: BEHAVIORAL HEALTH | Facility: CLINIC | Age: 58
End: 2018-03-23
Payer: MEDICARE

## 2018-03-23 ENCOUNTER — TELEPHONE (OUTPATIENT)
Dept: FAMILY MEDICINE | Facility: CLINIC | Age: 58
End: 2018-03-23

## 2018-03-23 ENCOUNTER — OFFICE VISIT (OUTPATIENT)
Dept: PSYCHIATRY | Facility: CLINIC | Age: 58
End: 2018-03-23
Payer: MEDICARE

## 2018-03-23 DIAGNOSIS — R26.89 DECREASED MOBILITY: Primary | ICD-10-CM

## 2018-03-23 DIAGNOSIS — G25.9 EXTRAPYRAMIDAL AND MOVEMENT DISORDER: ICD-10-CM

## 2018-03-23 DIAGNOSIS — F43.10 POSTTRAUMATIC STRESS DISORDER: ICD-10-CM

## 2018-03-23 DIAGNOSIS — F25.1 SCHIZOAFFECTIVE DISORDER, DEPRESSIVE TYPE (H): Primary | ICD-10-CM

## 2018-03-23 DIAGNOSIS — F25.0 SCHIZOAFFECTIVE DISORDER, BIPOLAR TYPE (H): ICD-10-CM

## 2018-03-23 PROCEDURE — 90833 PSYTX W PT W E/M 30 MIN: CPT | Performed by: PSYCHIATRY & NEUROLOGY

## 2018-03-23 PROCEDURE — 99214 OFFICE O/P EST MOD 30 MIN: CPT | Performed by: PSYCHIATRY & NEUROLOGY

## 2018-03-23 PROCEDURE — 90834 PSYTX W PT 45 MINUTES: CPT | Performed by: SOCIAL WORKER

## 2018-03-23 RX ORDER — PALIPERIDONE 9 MG/1
9 TABLET, EXTENDED RELEASE ORAL EVERY MORNING
Qty: 30 TABLET | Refills: 0 | Status: SHIPPED | OUTPATIENT
Start: 2018-03-23 | End: 2018-04-13

## 2018-03-23 RX ORDER — TRIHEXYPHENIDYL HYDROCHLORIDE 2 MG/1
2 TABLET ORAL 3 TIMES DAILY
Qty: 90 TABLET | Refills: 0 | Status: SHIPPED | OUTPATIENT
Start: 2018-03-23 | End: 2018-04-13

## 2018-03-23 RX ORDER — SERTRALINE HYDROCHLORIDE 100 MG/1
TABLET, FILM COATED ORAL
Qty: 60 TABLET | Refills: 0 | Status: SHIPPED | OUTPATIENT
Start: 2018-03-23 | End: 2018-04-20

## 2018-03-23 NOTE — PATIENT INSTRUCTIONS
- Start Zoloft (Sertraline) 50mg by mouth once daily for 2 weeks then increase to 100mg by mouth once daily.  - Increase Artane (trihexyphenidyl) to 2mg by mouth three times a day for abnormal movements of the mouth.  - Continue other medications.  - Recheck in 4 weeks.    24/7 Crisis Hotlines:    National Suicide Prevention Hotline   972-709-DUSJ (8293)    Crisis Hotlines by County:    [facility] Crisis Line     [phone]  [facility] Crisis Line   [phone]  [facility] Crisis Line     [phone]  [facility] COPE for Adults   [phone]  [facility] for [facility]    [phone]  [facility] for [facility]ults    [phone]  [facility]    [phone]  [facility] Crisis Line    [phone]    [facility] Crisis Response Team  [phone] ([phone])  [facility] Crisis is available 24 hours a day. The team provides callers with a needs assessment and referrals to appropriate resources. If medically necessary, the Crisis Response Team assists individuals by traveling to their location to provide face-to-face interventions and assessments. Crisis services are available for adults and children in [facility] and [facility]. Adult Crisis beds are also available if appropriate.    Walk-In Centers and Mobile Teams  [facility] Walk-In Crisis Intervention  [phone]  [facility] COPE (18+) Crisis Mobile Team    [phone]  [facility] Child (under 17) Crisis Mobile Team [phone]  [facility] for [facility] Walk-In & Mobile Teams [phone]    Additional Crisis Hotlines:  [facility]      222.712.5995  Crisis Connection      [phone]  [facility] ([facility] Crisis Services)  [phone]  [facility] Social Service (S)    [phone]  Men s Crisis Line      210-335-WIMG ([phone])  National Suicide Prevention Hotline   501-354-QMSM (9533)  [facility] Crisis Line    [phone]  Suicide Hotline       [phone]  [facility] ([facility])  Dial 2-1-17 ([phone])    Domestic Violence, Rape and Sexual Abuse Hotlines:  [place] Crisis Line     [phone]  [place] [place] Helpline  [phone]  Domestic Abuse Project (DAP)    9-245-039-7676*  [facility] Crisis Line     [phone]  [place] [facility]  1-280.944.3401*  [facility]       [phone] ([phone]*)  National Domestic Violence Hotline   4-507-696-GWTK  [facility]ape[facility] Abuse [facility] Crisis Line    [phone]   Sexual Violence Center (S[facility])    [phone]  [facility]     [phone] ([phone]*)

## 2018-03-23 NOTE — PROGRESS NOTES
"  [facility]iat[facility] Progress Note                                                                  eNna Tang is a 58 year old female who was referred by her primary care provider for treatment and evaluation of depression and psychosis  Therapist: N/A  PCP: Rowena Haas  Other Providers: N/A     History was provided by patient and care taker Zheng Barnes (tel # [phone]) who was a limited historian.    Pertinent Background:  See section specific documentation.  Psych critical item history includes suicidal ideation, psychosis [sxs include hallucinations], mutiple psychotropic trials, psych hosp (>5) and SUBSTANCE USE: cocaine and alcohol     Interim History                                                                                                        4, 4     - She reported feeling sad and tearful in the context of her sister being diagnosed with cancer.She said that her sister just had surgery and is doing better and because of that she is feeling better.  - She used to be on Zoloft but this was stopped after a medication error at her nursing home.  - She asks to restart a medication for depression. She was tearful during the session.  - Her abnormal movements of the tongue and lower jaw are still there. She is agreeable to increasing the Artane.  - She stated that her auditory hallucinations are \"letting up\" and not as severe.     Recent Symptoms:   Depression:  depressed mood, anhedonia, low energy and tearfulness  Psychosis:  auditory hallucinations  ADVERSE EFFECTS: abnormal movements of the tongue and lower jaw     Recent Substance Use:  none reported        Social/ Family History                                  [per patient report]                                 1ea,1ea   She is currently on SSDI and lives in an Adult [facility]. She is  for the last 3-4 years and was  about 12 years. She has two adult sons. She reported no history of " abuse in her life    Medical / Surgical History                                                                                                                  Patient Active Problem List   Diagnosis     Osteopenia     Vitamin B12 deficiency without anemia     Hyperlipidemia LDL goal <100     Rotator cuff syndrome     Type 2 diabetes mellitus with mild nonproliferative retinopathy (H)     Illiterate     Irritable bowel syndrome     overweight - BMI >35     Takotsubo cardiomyopathy     CAD (coronary artery disease)     Restless legs syndrome (RLS)     CINDY (obstructive sleep apnea)- mild AHI 10.3     Verbal auditory hallucination     Chronic low back pain     Schizoaffective disorder, depressive type (H)     Migraine headache     HTN, goal below 140/90     Health Care Home     Lumbago     Cervicalgia     Cocaine abuse, episodic     Suicidal ideation     Esophageal reflux     Mild nonproliferative diabetic retinopathy (H)     Tardive dyskinesia     Alcohol use     Left cataract     Falls frequently     History of uterine cancer     Psychophysiological insomnia     Dysuria     Asymptomatic postmenopausal status     Abdominal pain, right lower quadrant     Sepsis (H)     Pneumonia of right lower lobe due to infectious organism (H)     Infectious encephalopathy     Non-intractable vomiting with nausea     Acute respiratory failure with hypoxia (H)     Thoughts of self harm     Gastroenteritis     Posttraumatic stress disorder       Past Surgical History:   Procedure Laterality Date     C OOPHORECTORMY FOR RAHEL, W/BX  1983    UTERINE     CATARACT IOL, RT/LT Bilateral 2017     COLONOSCOPY N/A 3/16/2017    Procedure: COLONOSCOPY;  Surgeon: Traci Gonzalez MD;  Location: [facility]     Coronary CTA  5/21/2014     HYSTERECTOMY  1983    uterine cancer yearly pap's per provider.     LAPAROSCOPIC CHOLECYSTECTOMY  2008     PHACOEMULSIFICATION CLEAR CORNEA WITH STANDARD INTRAOCULAR LENS IMPLANT Left 5/5/2017    Procedure:  PHACOEMULSIFICATION CLEAR CORNEA WITH STANDARD INTRAOCULAR LENS IMPLANT;  LEFT EYE PHACOEMULSIFICATION CLEAR CORNEA WITH STANDARD INTRAOCULAR LENS IMPLANT ;  Surgeon: Tyra Diaz MD;  Location:  [facility]     PHACOEMULSIFICATION CLEAR CORNEA WITH STANDARD INTRAOCULAR LENS IMPLANT Right 6/30/2017    Procedure: PHACOEMULSIFICATION CLEAR CORNEA WITH STANDARD INTRAOCULAR LENS IMPLANT;  RIGHT EYE PHACOEMULSIFICATION CLEAR CORNEA WITH STANDARD INTRAOCULAR LENS IMPLANT;  Surgeon: Tyra Diaz MD;  Location:  [facility]     RELEASE TRIGGER FINGER  10/11/2012    Left thumb. Procedure: RELEASE TRIGGER FINGER;  LEFT THUMB TRIGGER RELEASE;  Surgeon: Tay Langley MD;  Location:  [facility]     RELEASE TRIGGER FINGER Right 12/26/2016    Procedure: RELEASE TRIGGER FINGER;  Surgeon: Albino Castañeda MD;  Location:  OR        Medical Review of Systems                                                                                                    2,10   A comprehensive review of systems was performed and is negative other than noted in the HPI.  Allergy                                Imidazole antifungals; Ketoprofen; Lisinopril; Metformin; Metronidazole; and Posaconazole  Current Medications                                                                                                       Current Outpatient Prescriptions   Medication Sig Dispense Refill     aspirin 81 MG EC tablet TAKE 1 TABLET (81MG) BY MOUTH DAILY 28 tablet 3     insulin pen needle (NOVOFINE 30) 30G X 8 MM USE 4 DAILY OR AS DIRECTED 400 each 0     insulin glargine (LANTUS) 100 UNIT/ML injection Inject 48 Units Subcutaneous At Bedtime 3 mL 11     vitamin D3 (CHOLECALCIFEROL) 26544 UNITS capsule TAKE 1 CAPSULE (50,000 UNITS) MONTHLY 12 capsule 1     calcium carbonate (OS-ALLEN 600 MG [place]. CA) 1500 (600 CA) MG tablet Take 1 tablet (1,500 mg) by mouth daily 180 tablet 3     paliperidone (INVEGA) 9 MG 24 hr tablet Take 1 tablet (9 mg) by mouth every morning  30 tablet 0     trihexyphenidyl (ARTANE) 2 MG tablet Take 1 tablet (2 mg) by mouth daily 30 tablet 0     losartan (COZAAR) 100 MG tablet TAKE 1 TABLET (100MG) BY MOUTH DAILY 28 tablet 3     gabapentin (NEURONTIN) 300 MG capsule TAKE 2 CAPSULES (600MG) BY MOUTH THREE TIMES A  capsule 3     SUMAtriptan (IMITREX) 25 MG tablet Take 1-2 tablets (25-50 mg) by mouth at onset of headache for migraine May repeat in 2 hours. Max 8 tablets/24 hours. 12 tablet 1     acetaminophen (TYLENOL) 500 MG tablet Take 2 tablets (1,000 mg) by mouth 3 times daily as needed for mild pain 100 tablet 0     melatonin 5 MG CAPS Take 2 capsules by mouth daily At dinnertime 180 capsule 3     blood glucose monitoring (ONETOUCH VERIO IQ) test strip Use to test blood sugar 4 times daily .  Ok to substitute alternative if insurance prefers. 200 strip 11     DiphenhydrAMINE HCl (DIPHENDRYL PO) Take 25 mg by mouth every 6 hours as needed for itching       clotrimazole (LOTRIMIN) 1 % cream Apply topically 2 times daily       insulin aspart (NOVOLOG FLEXPEN) 100 UNIT/ML injection Inject 20 Units Subcutaneous 3 times daily (with meals) Once daily, can add additional 5 units if BGs are >500mg/dL. 15 mL 11     prochlorperazine (COMPAZINE) 5 MG tablet Take 1 tablet (5 mg) by mouth every 6 hours as needed for nausea or vomiting 90 tablet 0     ranitidine (ZANTAC) 300 MG tablet TAKE 1 TABLET (300MG) BY MOUTH AT BEDTIME 90 tablet 1     order for DME Diabetic Shoes 2 each 0     blood glucose monitoring (ONE TOUCH DELICA) lancets Use to test blood sugar 2 times daily or as directed.  Ok to substitute alternative if insurance prefers. 150 each 11     albuterol (PROAIR HFA/PROVENTIL HFA/VENTOLIN HFA) 108 (90 BASE) MCG/ACT Inhaler Inhale 2 puffs into the lungs every 6 hours as needed for shortness of breath / dyspnea or wheezing 3 Inhaler 1     senna-docusate (SENOKOT-S;PERICOLACE) 8.6-50 MG per tablet Take 1 tablet by mouth 2 times daily as needed for  constipation       ACETAMINOPHEN PO Take 1,000 mg by mouth every 6 hours as needed for pain or fever       atorvastatin (LIPITOR) 80 MG tablet TAKE 1 TABLET (80MG) BY MOUTH DAILY 28 tablet 11     metoprolol (TOPROL-XL) 100 MG 24 hr tablet TAKE 1 TABLET (100MG) BY MOUTH DAILY 28 tablet 11     polyethylene glycol (MIRALAX/GLYCOLAX) powder TAKE 17 GRAMS (1 CAPFUL) BY MOUTH DAILY 527 g 3     TRULICITY 1.5 MG/0.5ML pen INJECT 1.5MG SUBCUTANEOUSLY EVERY SEVEN DAYS 2 mL 11     ibuprofen (ADVIL,MOTRIN) 600 MG tablet Take 1 tablet (600 mg) by mouth every 4 hours as needed for moderate pain 60 tablet 0     glucose 4 G CHEW Take 2 every 15 minutes for blood sugar <70mg/dL. Recheck blood sugar every 15 minutes until above 70mg/dL, then eat a substantial meal. 20 tablet 1     order for DME Hold Novolog if meals are refused. 1 each 0     Vitals                                                                                                                       3, 3   LMP 01/06/2015   Mental Status Exam                                                                                    9, 14 cog gs     Appearance: casually groomed  Behavior/Demeanor: cooperative, with good  eye contact   Speech: normal  Language: intact  Psychomotor: abnormal movements of the tongue and lower jaw  Mood: depressed  Affect: full range; was congruent to mood; was congruent to content  Thought Process/Associations: unremarkable  Thought Content:  Reports none;  Denies suicidal ideation and violent ideation  Perception:  Reports none;  Denies auditory hallucinations and visual hallucinations  Insight: limited  Judgment: limited  Cognition: (6) does  appear grossly intact; formal cognitive testing was not done  grossly oriented to her circumstances  Gait and Station: unremarkable     Labs and Data                                                                                                                 PHQ9   PHQ-9 SCORE 1/2/2017 2/3/2017 3/13/2018    Total Score - - -   Total Score - - -   Total Score 0 0 14     Diagnosis and Assessment                                                                             m2, h3     Today the following issues were addressed:    1) Psychosis  2) Extrapyramidal side effects    [place] Prescription Monitoring Program [] review was not needed today.    PSYCHOTROPIC DRUG INTERACTIONS: none clinically relevant     Diagnosis:  Schizoaffective Disorder    Plan                                                                                                                    m2, h3      - Start Zoloft (Sertraline) 50mg by mouth once daily for 2 weeks then increase to 100mg by mouth once daily.  - Increase Artane (trihexyphenidyl) to 2mg by mouth three times a day for abnormal movements of the mouth.  - Continue other medications.  - Recheck in 4 weeks.    CRISIS NUMBERS:   Provided routinely in AVS.    Treatment Risk Statement:  The patient understands the risks, benefits, adverse effects and alternatives. Agrees to treatment with the capacity to do so. No medical contraindications to treatment. Agrees to call clinic for any problems. The patient understands to call 911 or go to the nearest ED if life threatening or urgent symptoms occur.      [facility] Individual Psychotherapy Note                                                                     [16]     Start time - 4pm        End time -4:20 pm    Subjective: This supportive psychotherapy session addressed issues related to feelings of sadness regarding her sister's poor health.  Patient's reaction: Contemplation in the context of mental status appropriate for ambulatory setting.  Progress: fair to good  Plan: RTC 4 weeks  Psychotherapy services during this visit included myself, [facility] Lopez and the patient.     PROVIDER:  Kraig Pedersen MD

## 2018-03-23 NOTE — PROGRESS NOTES
[facility]   March 23, 2018      Behavioral Health Clinician Progress Note    Patient Name: Nena Tang           Service Type:  Individual      Service Location:   Face to Face in Clinic     Session Start Time: 3:48pm  Session End Time: 4:25pm      Session Length: 38 - 52      Attendees: Patient    Visit Activities (Refresh list every visit): [facility] Only    Diagnostic Assessment Date: 1-23-18  Treatment Plan Review Date: Not completed yet  See Flowsheets for today's PHQ-9 and TAMIA-7 results  Previous PHQ-9:   PHQ-9 SCORE 1/2/2017 2/3/2017 3/13/2018   Total Score - - -   Total Score - - -   Total Score 0 0 14     Previous TAMIA-7:   TAMIA-7 SCORE 1/2/2017 2/3/2017 3/13/2018   Total Score - - -   Total Score 0 0 17   Total Score - - -       ALEXANDRA LEVEL:  ALEXANDRA Score (Last Two) 8/30/2011 6/9/2014   ALEXANDRA Raw Score 42 37   Activation Score 66 49.9   ALEXANDRA Level 3 2       DATA  Extended Session (60+ minutes): No  Interactive Complexity: No  Crisis: No  [facility] Patient: No    Treatment Objective(s) Addressed in This Session:  Target Behavior(s): disease management/lifestyle changes mental health    Depressed Mood: Increase interest, engagement, and pleasure in doing things  Decrease frequency and intensity of feeling down, depressed, hopeless  Improve quantity and quality of night time sleep / decrease daytime naps  Feel less tired and more energy during the day   Identify negative self-talk and behaviors: challenge core beliefs, myths, and actions  Improve concentration, focus, and mindfulness in daily activities   Decrease thoughts that you'd be better off dead or of suicide / self-harm  Anxiety: will experience a reduction in anxiety, will develop more effective coping skills to manage anxiety symptoms, will develop healthy cognitive patterns and beliefs and will increase ability to function adaptively  Alcohol / Substance Use: continue to make healthy choices regarding substance use and engage in  "activities / supportive services that promote sobriety  Thought Disturbance: will develop skills to more effectively manage symptoms, will develop more effective support systems and will continue to take medications as prescribed    Current Stressors / Issues:    The patient stated that her sister is doing better and that she feels better and that she was able to get some of that worry off her-grief work around her -anger, sadness, hell no, about letting the ashes go-his daughter wants some of his ashes-I think I could give her some of the ashes-I will give her some ashes because it is his daughter-something crazy happen between patient and 's family around the time of his death-no suicidal intent to harm the self-The second part of the visit was during a visit with [facility] psychiatrist and he reviewed the medication changes-she reported that she has been doing much better since her sister has been having improved health-voices are less problematic-feels sadness and feels alone-she was emotional during the visit-\"I don't have no one\" she wants to be around her family-I will be able to see my sister and my grandchildren and will see my son that I have not seen in a long time and this will be good-this writer reminded the patient of how when she gets determined she can do anything.  She agreed with the psychiatrist recommendations for some medication changes.         Progress on Treatment Objective(s) / Homework:  Minimal progress - PREPARATION (Decided to change - considering how); Intervened by negotiating a change plan and determining options / strategies for behavior change, identifying triggers, exploring social supports, and working towards setting a date to begin behavior change    Motivational Interviewing    MI Intervention: Expressed Empathy/Understanding, Supported Autonomy, Collaboration, Evocation, Permission to raise concern or advise, Open-ended questions, Reflections: simple and complex, " Rolled with resistance: Emphasized patient autonomy, Simple reflection and Evoked patient agenda and Change talk (evoked)     Change Talk Expressed by the Patient: Desire to change Reasons to change Need to change Committment to change Activation Taking steps    Provider Response to Change Talk: E - Evoked more info from patient about behavior change, A - Affirmed patient's thoughts, decisions, or attempts at behavior change, R - Reflected patient's change talk and S - Summarized patient's change talk statements      Care Plan review completed: No    Medication Review:  No changes to current psychiatric medication(s)   Medication Compliance:  NA    Changes in Health Issues:   None reported     Chemical Use Review:   Substance Use: Chemical use reviewed, no active concerns identified      Tobacco Use: No current tobacco use.      Assessment: Current Emotional / Mental Status (status of significant symptoms):  Risk status (Self / Other harm or suicidal ideation)  Patient has had a history of suicidal ideation: yes  Patient denies current fears or concerns for personal safety.  Patient denies current or recent suicidal ideation or behaviors.  Patient denies current or recent homicidal ideation or behaviors.  Patient denies current or recent self injurious behavior or ideation.  Patient denies other safety concerns.  A safety and risk management plan has not been developed at this time, however patient was encouraged to call [place] [phone] should there be a change in any of these risk factors.    Appearance:   Appropriate   Eye Contact:   Good   Psychomotor Behavior: Agitated   Attitude:   Cooperative   Orientation:   All  Speech   Rate / Production: Normal    Volume:  Normal   Mood:    Depressed  Sad   Affect:    Appropriate  Blunted  Tearful   Thought Content:  Clear   Thought Form:  Coherent   Insight:    Fair     Diagnoses:  1. Schizoaffective disorder, depressive type (H)    2. Posttraumatic stress disorder         Collateral Reports Completed:  Not Applicable    Plan: (Homework, other):  Patient was given information about behavioral services and encouraged to schedule a follow up appointment with the clinic [facility] in 1 week.  She was also given information about mental health symptoms and treatment options .  CD Recommendations: Maintain Sobriety.    BIN George, [facility]      ______________________________________________________________________

## 2018-03-23 NOTE — MR AVS SNAPSHOT
After Visit Summary   3/23/2018    Nena Tang    MRN: [id number]           Patient Information     Date Of Birth          1960        Visit Information        Provider Department      3/23/2018 3:30 PM Richardson Lopez, [facility]        Today's Diagnoses     Schizoaffective disorder, depressive type (H)    -  1    Posttraumatic stress disorder           Follow-ups after your visit        Your next 10 appointments already scheduled     Apr 04, 2018 10:30 AM CDT   Return Visit with Richardson Lopez [facility] ([facility])    [address]  [address]  [facility] [address]   [phone]            Apr 17, 2018  2:00 PM CDT   Return Visit with ART Arias [facility] ([facility])    [address]  [address]  [facility] [id number] [phone]            Apr 17, 2018  2:00 PM CDT   Return Visit with Richardson Lopez [facility] ([facility])    [address]  [address]  [facility] [id number] [phone]            Apr 17, 2018  2:00 PM CDT   SHORT with Eugenia De Jesus [facility] ([facility])    [address] [place] [address]  [address]  [facility] [phone]            Apr 20, 2018 10:00 AM CDT   Return Visit with Kraig Pedersen MD   [facility] ([facility])    [address]  [facility] [id number] [phone]              Who to contact     If you have questions or need follow up information about today's clinic visit or your schedule please contact [facility] directly at [phone].  Normal or non-critical lab and imaging results will  be communicated to you by Funjihart, letter or phone within 4 business days after the clinic has received the results. If you do not hear from us within 7 days, please contact the clinic through OutTrippin or phone. If you have a critical or abnormal lab result, we will notify you by phone as soon as possible.  Submit refill requests through OutTrippin or call your pharmacy and they will forward the refill request to us. Please allow 3 business days for your refill to be completed.          Additional Information About Your Visit        OutTrippin Information     OutTrippin gives you secure access to your electronic health record. If you see a primary care provider, you can also send messages to your care team and make appointments. If you have questions, please call your primary care clinic.  If you do not have a primary care provider, please call [phone] and they will assist you.        Care EveryWhere ID     This is your Care EveryWhere ID. This could be used by other organizations to access your [place] medical records  [id number]        Your Vitals Were     Last Period                   01/06/2015            Blood Pressure from Last 3 Encounters:   03/13/18 112/72   02/12/18 110/62   02/09/18 161/84    Weight from Last 3 Encounters:   03/13/18 236 lb (107 kg)   02/12/18 228 lb 8 oz (103.6 kg)   01/23/18 226 lb 8 oz (102.7 kg)              Today, you had the following     No orders found for display         Today's Medication Changes          These changes are accurate as of 3/23/18 11:59 PM.  If you have any questions, ask your nurse or doctor.               Start taking these medicines.        Dose/Directions    sertraline 100 MG tablet   Commonly known as:  ZOLOFT   Used for:  Schizoaffective disorder, bipolar type (H)   Started by:  Kraig Pedersen MD        Take 50mg by mouth once daily for 2 weeks then increase to 100mg by mouth once daily   Quantity:  60 tablet   Refills:  0         These medicines have changed  or have updated prescriptions.        Dose/Directions    trihexyphenidyl 2 MG tablet   Commonly known as:  ARTANE   This may have changed:  when to take this   Used for:  Schizoaffective disorder, bipolar type (H), Extrapyramidal and movement disorder   Changed by:  Kraig Pedersen MD        Dose:  2 mg   Take 1 tablet (2 mg) by mouth 3 times daily   Quantity:  90 tablet   Refills:  0            Where to get your medicines      These medications were sent to [address] [address]     Phone:  [phone]     paliperidone 9 MG 24 hr tablet    sertraline 100 MG tablet    trihexyphenidyl 2 MG tablet                Primary Care Provider Office Phone # Fax #    Rowena GARRETT [facility] [phone]       [address] [facility] [address]  [facility] [address]        Goals        General    Medical (pt-stated)     Notes - Note created  7/7/2016  9:15 AM by Chelsea Pulido RN    Get blood sugars under control    Improve medication compliance    As of today's date 7/7/2016 goal is met at 0 - 25%.   Goal Status:  Active          Equal Access to Services     RMAESH GARRIDO : Hadii samira shane hadasho Soomaali, waaxda luqadaha, qaybta kaalmada adeegyada, lorena martins. So [facility] [phone].    ATENCIÓN: Si habla español, tiene a trinh disposición servicios gratuitos de asistencia lingüística. Llame al [phone].    We comply with applicable federal civil rights laws and [place] laws. We do not discriminate on the basis of race, color, national origin, age, disability, sex, sexual orientation, or gender identity.            Thank you!     Thank you for choosing [facility]  for your care. Our goal is always to provide you with excellent care. Hearing back from our patients is one way we can continue to improve our services. Please take a few minutes to complete the written  survey that you may receive in the mail after your visit with us. Thank you!             Your Updated Medication List - Protect others around you: Learn how to safely use, store and throw away your medicines at www.disposemymeds.org.          This list is accurate as of 3/23/18 11:59 PM.  Always use your most recent med list.                   Brand Name Dispense Instructions for use Diagnosis    * ACETAMINOPHEN PO      Take 1,000 mg by mouth every 6 hours as needed for pain or fever        * acetaminophen 500 MG tablet    TYLENOL    100 tablet    Take 2 tablets (1,000 mg) by mouth 3 times daily as needed for mild pain    Chronic low back pain, unspecified back pain laterality, with sciatica presence unspecified       albuterol 108 (90 BASE) MCG/ACT Inhaler    PROAIR HFA/PROVENTIL HFA/VENTOLIN HFA    3 Inhaler    Inhale 2 puffs into the lungs every 6 hours as needed for shortness of breath / dyspnea or wheezing    Dyspnea on exertion       aspirin 81 MG EC tablet     28 tablet    TAKE 1 TABLET (81MG) BY MOUTH DAILY    Coronary artery disease involving native heart with angina pectoris, unspecified vessel or lesion type (H)       atorvastatin 80 MG tablet    LIPITOR    28 tablet    TAKE 1 TABLET (80MG) BY MOUTH DAILY    Hyperlipidemia LDL goal <100       blood glucose monitoring lancets     150 each    Use to test blood sugar 2 times daily or as directed.  Ok to substitute alternative if insurance prefers.    Type 2 diabetes mellitus with diabetic neuropathy, with long-term current use of insulin (H)       blood glucose monitoring test strip    ONETOUCH VERIO IQ    200 strip    Use to test blood sugar 4 times daily .  Ok to substitute alternative if insurance prefers.    Type 2 diabetes mellitus with diabetic neuropathy, with long-term current use of insulin (H)       calcium carbonate 1500 (600 CA) MG tablet    OS-ALLEN 600 mg [place]. [place]    180 tablet    Take 1 tablet (1,500 mg) by mouth daily    Other osteoporosis  without current pathological fracture       clotrimazole 1 % cream    LOTRIMIN     Apply topically 2 times daily        DIPHENDRYL PO      Take 25 mg by mouth every 6 hours as needed for itching        gabapentin 300 MG capsule    NEURONTIN    168 capsule    TAKE 2 CAPSULES (600MG) BY MOUTH THREE TIMES A DAY    Type 2 diabetes mellitus with diabetic neuropathy, with long-term current use of insulin (H)       glucose 4 G Chew chewable tablet     20 tablet    Take 2 every 15 minutes for blood sugar <70mg/dL. Recheck blood sugar every 15 minutes until above 70mg/dL, then eat a substantial meal.    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, with long-term current use of insulin, unspecified laterality (H)       insulin pen needle 30G X 8 MM    NOVOFINE 30    400 each    USE 4 DAILY OR AS DIRECTED    Type 2 diabetes mellitus with mild nonproliferative retinopathy without macular edema, with long-term current use of insulin, unspecified laterality (H)       losartan 100 MG tablet    COZAAR    28 tablet    TAKE 1 TABLET (100MG) BY MOUTH DAILY    Coronary artery disease involving native  heart with angina pectoris, unspecified vessel or lesion type (H)       melatonin 5 MG Caps     180 capsule    Take 2 capsules by mouth daily At dinnertime    Insomnia, unspecified type       metoprolol succinate 100 MG 24 hr tablet    TOPROL-XL    28 tablet    TAKE 1 TABLET (100MG) BY MOUTH DAILY    Coronary artery disease involving native heart with angina pectoris, unspecified vessel or lesion type (H)       * order for DME     1 each    Hold Novolog if meals are refused.    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, with long-term current use of insulin, unspecified laterality (H)       paliperidone 9 MG 24 hr tablet    INVEGA    30 tablet    Take 1 tablet (9 mg) by mouth every morning    Schizoaffective disorder, bipolar type (H)       polyethylene glycol powder    MIRALAX/GLYCOLAX    527 g    TAKE 17 GRAMS (1 CAPFUL) BY MOUTH DAILY    Constipation, unspecified constipation type       prochlorperazine 5 MG tablet    COMPAZINE    90 tablet    Take 1 tablet (5 mg) by mouth every 6 hours as needed for nausea or vomiting    Nausea       ranitidine 300 MG tablet    ZANTAC    90 tablet    TAKE 1 TABLET (300MG) BY MOUTH AT BEDTIME    Gastroesophageal reflux disease without esophagitis       senna-docusate 8.6-50 MG per tablet    SENOKOT-S;PERICOLACE     Take 1 tablet by mouth 2 times daily as needed for constipation        sertraline 100 MG tablet    ZOLOFT    60 tablet    Take 50mg by mouth once daily for 2 weeks then increase to 100mg by mouth once daily    Schizoaffective disorder, bipolar type (H)       SUMAtriptan 25 MG tablet    IMITREX    12 tablet    Take 1-2 tablets (25-50 mg) by mouth at onset of headache for migraine May repeat in 2 hours. Max 8 tablets/24  hours.    Migraine with aura and without status migrainosus, not intractable       trihexyphenidyl 2 MG tablet    ARTANE    90 tablet    Take 1 tablet (2 mg) by mouth 3 times daily    Schizoaffective disorder, bipolar type (H), Extrapyramidal and movement disorder       TRULICITY 1.5 MG/0.5ML pen   Generic drug:  dulaglutide     2 mL    INJECT 1.5MG SUBCUTANEOUSLY EVERY SEVEN DAYS    Type 2 diabetes mellitus with mild nonproliferative retinopathy without macular edema, with long-term current use of insulin, unspecified laterality (H)       vitamin D3 31600 UNITS capsule    CHOLECALCIFEROL    12 capsule    TAKE 1 CAPSULE (50,000 UNITS) MONTHLY    Vitamin D deficiency       * Notice:  This list has 4 medication(s) that are the same as other medications prescribed for you. Read the directions carefully, and ask your doctor or other care provider to review them with you.

## 2018-03-23 NOTE — MR AVS SNAPSHOT
MRN:[id number]                      After Visit Summary   3/23/2018    Nena Tang    MRN: [id number]           Visit Information        Provider Department      3/23/2018 4:00 PM Kraig Pedersen MD [facility]        Your next 10 appointments already scheduled     Apr 17, 2018  2:00 PM CDT   Return Visit with Rowena Haas, ART CNP   [facility] ([facility])    [address]  [address] [place] [id number] [phone]            Apr 17, 2018  2:00 PM CDT   Return Visit with Richardson Lopez, [facility] ([facility])    [address]  [address] [place] [id number] [phone]            Apr 17, 2018  2:00 PM CDT   SHORT with Eugenia De Jesus [facility] ([facility])    [address] [place] [address]  [address] [place] [address] [id number] [phone]              Care Instructions    - Start Zoloft (Sertraline) 50mg by mouth once daily for 2 weeks then increase to 100mg by mouth once daily.  - Increase Artane (trihexyphenidyl) to 2mg by mouth three times a day for abnormal movements of the mouth.  - Continue other medications.  - Recheck in 4 weeks.    24/7 Crisis Hotlines:    National Suicide Prevention Hotline   278-028-UZMK (2169)    Crisis Hotlines by County:    [place] Crisis Line     [phone]  [place] Crisis Line   [phone]  [place] Crisis Line     [phone]  [place] COPE for Adults   [phone]  [place] for Children    [phone]  [place] for Adults    [phone]  [place] for Children    [phone]  [place] Crisis Line    [phone]    [facility] Crisis Response Team  [phone] ([phone])  [facility] Crisis is available 24 hours a day. The team provides callers with a needs  assessment and referrals to appropriate resources. If medically necessary, the Crisis Response Team assists individuals by traveling to their location to provide face-to-face interventions and assessments. Crisis services are available for adults and children in [facility] and [facility]. Adult Crisis beds are also available if appropriate.    Walk-In Centers and Mobile Teams  [facility] Walk-In Crisis Intervention  [phone]  [facility] COPE (18+) Crisis Mobile Team    [phone]  [facility] Child (under 17) Crisis Mobile Team [phone]  [place] [facility] Walk-In & Mobile Teams [phone]    Additional Crisis Hotlines:  [facility] for Youth      422.633.3152  Crisis Connection      [phone]  [facility] ([facility] Crisis Services)  [phone]  [facility] Social Service (S)    [phone]  Men s Crisis Line      591-211-HMOF ([phone])  National Suicide Prevention Hotline   242-636-MBDX (9318)  [facility] Crisis Line    [phone]  Suicide Hotline      405.278.1532  [facility] (formerly [facility])  Dial 2-1-8 ([phone])    Domestic Violence, Rape and Sexual Abuse Hotlines:  [place] Crisis Line     948.246.8029  [place]: [facility] Helpline  [phone]  Domestic Abuse Project (DAP)    0-990-146-6197*  [place] Crisis Line     [phone]  [place] [facility]attered [facility]  0-020-300-6837*  [place] [facility]       [phone] ([phone]*)  National Domestic Violence Hotline   7-817-506-CTDS  Rape/Sexual Abuse Center Crisis Line    [phone]   Sexual Violence Center (SVC)    [phone]  [employer]     [phone] ([phone]*)           MyChart Information     MetroWorkshart gives you secure access to your electronic health record. If you see a primary care provider, you can also send messages to your care team and make appointments. If you have  questions, please call your primary care clinic.  If you do not have a primary care provider, please call [phone] and they will assist you.        Care EveryWhere ID     This is your Care EveryWhere ID. This could be used by other organizations to access your [place] medical records  [id number]        Equal Access to Services     RAMESH GARRIDO : Azul Rios, luis napier, lorena galvin. So [facility] [phone].    ATENCIÓN: Si habla español, tiene a trinh disposición servicios gratuitos de asistencia lingüística. Llame al [phone].    We comply with applicable federal civil rights laws and [place] laws. We do not discriminate on the basis of race, color, national origin, age, disability, sex, sexual orientation, or gender identity.

## 2018-03-23 NOTE — TELEPHONE ENCOUNTER
Reason for Call:  Physical Therapy assessment     Detailed comments: Alva ([facility]) called she's requesting a referral for PT assessment for mobility.     Phone Number Patient can be reached at: Other phone number:  Alva  [phone]    Best Time: anytime    Can we leave a detailed message on this number? Not sure     Call taken on 3/23/2018 at 10:19 AM by Giorgi Case

## 2018-03-27 ENCOUNTER — TELEPHONE (OUTPATIENT)
Dept: FAMILY MEDICINE | Facility: CLINIC | Age: 58
End: 2018-03-27

## 2018-03-27 DIAGNOSIS — R26.89 DECREASED MOBILITY: Primary | ICD-10-CM

## 2018-03-27 NOTE — TELEPHONE ENCOUNTER
Writer called [facility] back.    Referral is not for Exercise,     It's a required state stated assessment called Mobility Assessment.    Indra Le RN

## 2018-03-27 NOTE — TELEPHONE ENCOUNTER
Please call the [facility] and determine what kind of assessment they are looking for, it is our understanding that she is very mobile, and just chooses not to exercise so what exactly do they want PT to do    Jemima HENSLEY

## 2018-03-27 NOTE — TELEPHONE ENCOUNTER
Writer called Shane back and informed that pt will need to have assessment completed at a PT location and would not qualify for HC assessment.    Shane asked for a place close to [place].    Indra Le RN

## 2018-03-27 NOTE — TELEPHONE ENCOUNTER
[facility] requesting a PT referral for mobility assessment mandated by the state   PT referral placed, they will call to set up the appt    Jemima HENSLEY

## 2018-03-30 NOTE — TELEPHONE ENCOUNTER
PT REFERRAL:    *This therapy referral will be filtered to a centralized scheduling office at [facility] and the patient will receive a call to schedule an appointment at a [place] location most convenient for them. * Patient prefers to go somewhere close to [place]    If you have not heard from the scheduling office within 2 business days, please call [phone] for all locations    Attempted to reach [facility] at [facility], unable. Left message  to call back.    Rosina Rao RN

## 2018-04-04 ENCOUNTER — OFFICE VISIT (OUTPATIENT)
Dept: BEHAVIORAL HEALTH | Facility: CLINIC | Age: 58
End: 2018-04-04
Payer: MEDICARE

## 2018-04-04 DIAGNOSIS — F25.1 SCHIZOAFFECTIVE DISORDER, DEPRESSIVE TYPE (H): Primary | ICD-10-CM

## 2018-04-04 DIAGNOSIS — F43.10 POSTTRAUMATIC STRESS DISORDER: ICD-10-CM

## 2018-04-04 PROCEDURE — 90834 PSYTX W PT 45 MINUTES: CPT | Performed by: SOCIAL WORKER

## 2018-04-04 NOTE — MR AVS SNAPSHOT
After Visit Summary   4/4/2018    Nena Tang    MRN: [id number]           Patient Information     Date Of Birth          1960        Visit Information        Provider Department      4/4/2018 10:30 AM Richardson Lopez, [facility]        Today's Diagnoses     Schizoaffective disorder, depressive type (H)    -  1    Posttraumatic stress disorder           Follow-ups after your visit        Your next 10 appointments already scheduled     Apr 16, 2018  3:15 PM CDT   Evaluation with Kiko Gallego, PT   [facility] ([facility])    [address] [facility] [address]   [phone]            Apr 17, 2018  2:00 PM CDT   Return Visit with ART Arias [facility] ([facility])    [address]  [address] [place] [address]   [phone]            Apr 17, 2018  2:00 PM CDT   Return Visit with Richardson Lopez [facility] ([facility])    [address]  [address] [place] [address]   [phone]            Apr 17, 2018  2:00 PM CDT   SHORT with Eugenia De Jesus [facility] [place] ([facility])    [address] [place] [address]  [address] [place] [address]   [phone]            Apr 20, 2018 10:00 AM CDT   Return Visit with Kraig Pedersen MD   [facility] ([facility])    [address] [facility] [address]   [phone]            Apr 25, 2018 10:30 AM CDT   Return Visit with Richardson Lopez [facility] ([facility])    [address]  [address] [place] [address]   [phone]            Jun 07, 2018  4:15 PM CDT   (Arrive by 4:00 PM)    New Weight Management Visit with Debbie Germain PA-C   [facility] ([facility])    [address]  [address] [facility] [address] [phone]              Who to contact     If you have questions or need follow up information about today's clinic visit or your schedule please contact [facility] directly at [phone].  Normal or non-critical lab and imaging results will be communicated to you by bluebottlebizhart, letter or phone within 4 business days after the clinic has received the results. If you do not hear from us within 7 days, please contact the clinic through [email or web address] or phone. If you have a critical or abnormal lab result, we will notify you by phone as soon as possible.  Submit refill requests through [email or web address] or call your pharmacy and they will forward the refill request to us. Please allow 3 business days for your refill to be completed.          Additional Information About Your Visit        [email or web address] Information     [email or web address] gives you secure access to your electronic health record. If you see a primary care provider, you can also send messages to your care team and make appointments. If you have questions, please call your primary care clinic.  If you do not have a primary care provider, please call [phone] and they will assist you.        Care EveryWhere ID     This is your Care EveryWhere ID. This could be used by other organizations to access your [place] medical records  [id number]        Your Vitals Were     Last Period                   01/06/2015            Blood Pressure from Last 3 Encounters:   03/13/18 112/72   02/12/18 110/62   02/09/18 161/84    Weight from Last 3 Encounters:   03/13/18 236 lb (107 kg)   02/12/18 228 lb 8 oz (103.6 kg)   01/23/18 226 lb 8 oz (102.7 kg)              Today, you had the following     No orders found for display       Primary Care Provider Office Phone # Fax #     Rowena Haas, APRN [phone]       [address] [facility] [address]  [facility] [address]        Goals        General    Medical (pt-stated)     Notes - Note created  7/7/2016  9:15 AM by Chelsea Pulido, RN    Get blood sugars under control    Improve medication compliance    As of today's date 7/7/2016 goal is met at 0 - 25%.   Goal Status:  Active          Equal Access to Services     [facility] : Hadii aad ku hadasho Soomaali, waaxda luqadaha, qaybta kaalmada adeegyada, waxay idiin hayaan adeeg kharash la'aan . So [facility] [phone].    ATENCIÓN: Si habla español, tiene a trinh disposición servicios gratuitos de asistencia lingüística. [facility] [phone].    We comply with applicable federal civil rights laws and [place] laws. We do not discriminate on the basis of race, color, national origin, age, disability, sex, sexual orientation, or gender identity.            Thank you!     Thank you for choosing [facility]  for your care. Our goal is always to provide you with excellent care. Hearing back from our patients is one way we can continue to improve our services. Please take a few minutes to complete the written survey that you may receive in the mail after your visit with us. Thank you!             Your Updated Medication List - Protect others around you: Learn how to safely use, store and throw away your medicines at www.disposemymeds.org.          This list is accurate as of 4/4/18 11:59 PM.  Always use your most recent med list.                   Brand Name Dispense Instructions for use Diagnosis    * ACETAMINOPHEN PO      Take 1,000 mg by mouth every 6 hours as needed for pain or fever        * acetaminophen 500 MG tablet    TYLENOL    100 tablet    Take 2 tablets (1,000 mg) by mouth 3 times daily as needed for mild pain    Chronic low back pain, unspecified back pain laterality, with sciatica presence unspecified       albuterol 108 (90 BASE) MCG/ACT  Inhaler    PROAIR HFA/PROVENTIL HFA/VENTOLIN HFA    3 Inhaler    Inhale 2 puffs into the lungs every 6 hours as needed for shortness of breath / dyspnea or wheezing    Dyspnea on exertion       aspirin 81 MG EC tablet     28 tablet    TAKE 1 TABLET (81MG) BY MOUTH DAILY    Coronary artery disease involving native heart with angina pectoris, unspecified vessel or lesion type (H)       atorvastatin 80 MG tablet    LIPITOR    28 tablet    TAKE 1 TABLET (80MG) BY MOUTH DAILY    Hyperlipidemia LDL goal <100       blood glucose monitoring lancets     150 each    Use to test blood sugar 2 times daily or as directed.  Ok to substitute alternative if insurance prefers.    Type 2 diabetes mellitus with diabetic neuropathy, with long-term current use of insulin (H)       blood glucose monitoring test strip    ONETOUCH VERIO IQ    200 strip    Use to test blood sugar 4 times daily .  Ok to substitute alternative if insurance prefers.    Type 2 diabetes mellitus with diabetic neuropathy, with long-term current use of insulin (H)       calcium carbonate 1500 (600 CA) MG tablet    OS-ALLEN 600 mg [place]. Ca    180 tablet    Take 1 tablet (1,500 mg) by mouth daily    Other osteoporosis without current pathological fracture       clotrimazole 1 % cream    LOTRIMIN     Apply topically 2 times daily        DIPHENDRYL PO      Take 25 mg by mouth every 6 hours as needed for itching        gabapentin 300 MG capsule    NEURONTIN    168 capsule    TAKE 2 CAPSULES (600MG) BY MOUTH THREE TIMES A DAY    Type 2 diabetes mellitus with diabetic neuropathy, with long-term current use of insulin (H)       glucose 4 G Chew chewable tablet     20 tablet    Take 2 every 15 minutes for blood sugar <70mg/dL. Recheck blood sugar every 15 minutes until above 70mg/dL, then eat a substantial meal.    Type 2 diabetes mellitus with mild nonproliferative retinopathy without macular edema, with long-term current use of insulin, unspecified laterality (H)        ibuprofen 600 MG tablet    ADVIL/MOTRIN    60 tablet    Take 1 tablet (600 mg) by mouth every 4 hours as needed for moderate pain    Closed fracture of one rib of right side, initial encounter       insulin aspart 100 UNIT/ML injection    NovoLOG FLEXPEN    15 mL    Inject 20 Units Subcutaneous 3 times daily (with meals) Once daily, can add additional 5 units if BGs are >500mg/dL.    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, with long-term current use of insulin, unspecified laterality (H)       insulin pen needle 30G X 8 MM    NOVOFINE 30    400 each    USE 4 DAILY OR AS DIRECTED    Type 2 diabetes mellitus with mild nonproliferative retinopathy without macular edema, with long-term current use of insulin, unspecified laterality (H)       losartan 100 MG tablet    COZAAR    28 tablet    TAKE 1 TABLET (100MG) BY MOUTH DAILY    Coronary artery disease involving native heart with angina pectoris, unspecified vessel or lesion type (H)       melatonin 5 MG Caps     180 capsule    Take 2 capsules by mouth daily At dinnertime    Insomnia, unspecified type       metoprolol succinate 100 MG 24 hr tablet    TOPROL-XL    28 tablet    TAKE 1 TABLET (100MG) BY MOUTH DAILY    Coronary artery disease involving native heart with angina pectoris, unspecified vessel or lesion type (H)       * order for DME     1 each    Hold Novolog if meals are refused.    Type 2 diabetes mellitus with mild nonproliferative retinopathy without macular edema, with long-term current use of insulin, unspecified laterality (H)       * order for DME     2 each    Diabetic Shoes    Type 2 diabetes mellitus with mild nonproliferative retinopathy without  macular edema, with long-term current use of insulin, unspecified laterality (H)       paliperidone 9 MG 24 hr tablet    INVEGA    30 tablet    Take 1 tablet (9 mg) by mouth every morning    Schizoaffective disorder, bipolar type (H)       polyethylene glycol powder    MIRALAX/GLYCOLAX    527 g    TAKE 17 GRAMS (1 CAPFUL) BY MOUTH DAILY    Constipation, unspecified constipation type       prochlorperazine 5 MG tablet    COMPAZINE    90 tablet    Take 1 tablet (5 mg) by mouth every 6 hours as needed for nausea or vomiting    Nausea       ranitidine 300 MG tablet    ZANTAC    90 tablet    TAKE 1 TABLET (300MG) BY MOUTH AT BEDTIME    Gastroesophageal reflux disease without esophagitis       senna-docusate 8.6-50 MG per tablet    SENOKOT-S;PERICOLACE     Take 1 tablet by mouth 2 times daily as needed for constipation        sertraline 100 MG tablet    ZOLOFT    60 tablet    Take 50mg by mouth once daily for 2 weeks then increase to 100mg by mouth once daily    Schizoaffective disorder, bipolar type (H)       SUMAtriptan 25 MG tablet    IMITREX    12 tablet    Take 1-2 tablets (25-50 mg) by mouth at onset of headache for migraine May repeat in 2 hours. Max 8 tablets/24 hours.    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 Read the directions carefully, and ask your doctor or other care provider to review them with you.

## 2018-04-04 NOTE — PROGRESS NOTES
[facility]   April 4, 2018      Behavioral Health Clinician Progress Note    Patient Name: Nena Tang           Service Type:  Individual      Service Location:   Face to Face in Clinic     Session Start Time: 10:35pm  Session End Time: 11:15am      Session Length: 38 - 52      Attendees: Patient    Visit Activities (Refresh list every visit): [facility] Only    Diagnostic Assessment Date: 1-23-18  Treatment Plan Review Date: Not completed yet  See Flowsheets for today's PHQ-9 and TAMIA-7 results  Previous PHQ-9:   PHQ-9 SCORE 1/2/2017 2/3/2017 3/13/2018   Total Score - - -   Total Score - - -   Total Score 0 0 14     Previous TAMIA-7:   TAMIA-7 SCORE 1/2/2017 2/3/2017 3/13/2018   Total Score - - -   Total Score 0 0 17   Total Score - - -       ALEXANDRA LEVEL:  ALEXANDRA Score (Last Two) 8/30/2011 6/9/2014   ALEXANDRA Raw Score 42 37   Activation Score 66 49.9   ALEXANDRA Level 3 2       DATA  Extended Session (60+ minutes): No  Interactive Complexity: No  Crisis: No  [facility] Patient: No    Treatment Objective(s) Addressed in This Session:  Target Behavior(s): disease management/lifestyle changes mental health    Depressed Mood: Increase interest, engagement, and pleasure in doing things  Decrease frequency and intensity of feeling down, depressed, hopeless  Improve quantity and quality of night time sleep / decrease daytime naps  Feel less tired and more energy during the day   Identify negative self-talk and behaviors: challenge core beliefs, myths, and actions  Improve concentration, focus, and mindfulness in daily activities   Decrease thoughts that you'd be better off dead or of suicide / self-harm  Anxiety: will experience a reduction in anxiety, will develop more effective coping skills to manage anxiety symptoms, will develop healthy cognitive patterns and beliefs and will increase ability to function adaptively  Alcohol / Substance Use: continue to make healthy choices regarding substance use and engage in  activities / supportive services that promote sobriety  Thought Disturbance: will develop skills to more effectively manage symptoms, will develop more effective support systems and will continue to take medications as prescribed    Current Stressors / Issues:    The patient talked about her -she talked about how they met and things about how he was a cook-she is current pursuing accessing an apartment-she talked about it being scary moving to an apartment because this will be the first time that she is living on her own since her husbands death-she is still grieving the death of her mother and her -she is worried that she will have suicidal thoughts and may actually hurt the self with no one there to stop her-she stated that she has demi in her self and she stated that she will be okay-she is not planing to isolate-she has 2 therapist, a psychiatrist and PCP and has a  and [facility] worker-she was emotional during the visit and stated that she was thinking about her sister who is have health issues-she is concerned about her sister-What does the concern/worry motivate you to do: I have been pretty good the last 2 days-no suicidal thoughts-she has had numerous deaths in her life significant deaths-she blames herself for the death of her sister-I blame myself for my husbands death-she was able to contract for safety.           Progress on Treatment Objective(s) / Homework:  Minimal progress - PREPARATION (Decided to change - considering how); Intervened by negotiating a change plan and determining options / strategies for behavior change, identifying triggers, exploring social supports, and working towards setting a date to begin behavior change    Motivational Interviewing    MI Intervention: Expressed Empathy/Understanding, Supported Autonomy, Collaboration, Evocation, Permission to raise concern or advise, Open-ended questions, Reflections: simple and complex, Rolled with resistance:  Emphasized patient autonomy, Simple reflection and Evoked patient agenda and Change talk (evoked)     Change Talk Expressed by the Patient: Desire to change Ability to change Reasons to change Need to change Committment to change Activation Taking steps    Provider Response to Change Talk: E - Evoked more info from patient about behavior change, A - Affirmed patient's thoughts, decisions, or attempts at behavior change, R - Reflected patient's change talk and S - Summarized patient's change talk statements      Care Plan review completed: No    Medication Review:  No changes to current psychiatric medication(s)   Medication Compliance:  NA    Changes in Health Issues:   None reported     Chemical Use Review:   Substance Use: Chemical use reviewed, no active concerns identified      Tobacco Use: No current tobacco use.      Assessment: Current Emotional / Mental Status (status of significant symptoms):  Risk status (Self / Other harm or suicidal ideation)  Patient has had a history of suicidal ideation: yes  Patient denies current fears or concerns for personal safety.  Patient denies current or recent suicidal ideation or behaviors.  Patient denies current or recent homicidal ideation or behaviors.  Patient denies current or recent self injurious behavior or ideation.  Patient denies other safety concerns.  A safety and risk management plan has not been developed at this time, however patient was encouraged to call [facility] should there be a change in any of these risk factors.    Appearance:   Appropriate   Eye Contact:   Good   Psychomotor Behavior: Normal   Attitude:   Cooperative   Orientation:   All  Speech   Rate / Production: Normal    Volume:  Normal   Mood:    Normal Sad   Affect:    Appropriate  Blunted  Tearful   Thought Content:  Clear   Thought Form:  Coherent   Insight:    Fair     Diagnoses:  1. Schizoaffective disorder, depressive type (H)    2. Posttraumatic stress disorder         Collateral Reports Completed:  Not Applicable    Plan: (Homework, other):  Patient was given information about behavioral services and encouraged to schedule a follow up appointment with the clinic [facility] in 2 weeks.  She was also given information about mental health symptoms and treatment options .  CD Recommendations: Maintain Sobriety.    BIN George, [facility]      ______________________________________________________________________

## 2018-04-09 ENCOUNTER — TELEPHONE (OUTPATIENT)
Dept: FAMILY MEDICINE | Facility: CLINIC | Age: 58
End: 2018-04-09

## 2018-04-09 NOTE — TELEPHONE ENCOUNTER
Call from [facility] at group home, pt's BS have been running low. Pt's BS was fax over. [facility] want to know if pt insulin will change.  [facility] contact info: [phone]      Giorgi Case

## 2018-04-10 NOTE — TELEPHONE ENCOUNTER
Blood sugars are appropriate, continue to monitor and keep prescriptions as is. We will discuss the results at the next appointment date- 4/17/18.   ART Oliver CNP

## 2018-04-10 NOTE — TELEPHONE ENCOUNTER
Writer called [facility] and informed that BS are normal and that Pt should continue medication as is.  Discuss at F/U appt.    Indra Le RN

## 2018-04-11 DIAGNOSIS — Z79.4 TYPE 2 DIABETES MELLITUS WITH MILD NONPROLIFERATIVE RETINOPATHY WITHOUT MACULAR EDEMA, WITH LONG-TERM CURRENT USE OF INSULIN, UNSPECIFIED LATERALITY (H): ICD-10-CM

## 2018-04-11 DIAGNOSIS — E11.3299 TYPE 2 DIABETES MELLITUS WITH MILD NONPROLIFERATIVE RETINOPATHY WITHOUT MACULAR EDEMA, WITH LONG-TERM CURRENT USE OF INSULIN, UNSPECIFIED LATERALITY (H): ICD-10-CM

## 2018-04-11 NOTE — TELEPHONE ENCOUNTER
"Requested Prescriptions   Pending Prescriptions Disp Refills     TRULICITY 1.5 MG/0.5ML pen [Pharmacy Med Name: TRULICITY 1.5/0.5 INJ] 2 mL      Sig: INJECT 1.5MG SUBCUTANEOUSLY EVERY SEVEN DAYS    GLP-1 Agonists Protocol Failed    4/11/2018 11:29 AM       Failed - Normal serum creatinine on file in past 12 months    Recent Labs   Lab Test  02/08/18   2155   CR  1.09*            Passed - Blood pressure less than 140/90 in past 6 months    BP Readings from Last 3 Encounters:   03/13/18 112/72   02/12/18 110/62   02/09/18 161/84                Passed - LDL on file in past 12 months    Recent Labs   Lab Test  06/27/17   1358   LDL  64            Passed - Microalbumin on file in past 12 months    Recent Labs   Lab Test  06/27/17   1404   MICROL  11   UMALCR  6.81            Passed - HgbA1C in past 3 or 6 months    Recent Labs   Lab Test  02/12/18   1408   A1C  6.8*            Passed - Patient is age 18 or older       Passed - No active pregnancy on record       Passed - No positive pregnancy test in past 12 months       Passed - Recent (6 mo) or future (30 days) visit within the authorizing provider's specialty    Patient had office visit in the last 6 months or has a visit in the next 30 days with authorizing provider.  See \"Patient Info\" tab in inbasket, or \"Choose Columns\" in Meds & Orders section of the refill encounter.            Last Written Prescription Date:  5/3/17  Last Fill Quantity: 2,  # refills: 11   Last office visit: 3/13/2018 with prescribing provider:  DURGA   Future Office Visit:   Next 5 appointments (look out 90 days)     Apr 17, 2018  2:00 PM CDT   Return Visit with ART Arias CNP   [facility] ([facility])    [address] [place] [address]  [address]  [facility] [address] [phone]            Apr 17, 2018  2:00 PM CDT   Return Visit with Richardson Lopez [facility] ([facility])    [address]  [address]  [facility] [address]   [phone]            Apr 17, 2018  2:00 PM CDT   SHORT with Eugenia De Jesus [facility] ([facility])    [address] [facility]  [address]  [facility] [address]   [phone]            Apr 20, 2018 10:00 AM CDT   Return Visit with Kraig Pedersen MD   [facility] ([facility])    [address]  [facility] [address]   [phone]            Apr 25, 2018 10:30 AM CDT   Return Visit with Richardson Lopez [facility] ([facility])    [address]  [address]  [facility] [address]   [phone]

## 2018-04-13 DIAGNOSIS — F25.0 SCHIZOAFFECTIVE DISORDER, BIPOLAR TYPE (H): ICD-10-CM

## 2018-04-13 DIAGNOSIS — G25.9 EXTRAPYRAMIDAL AND MOVEMENT DISORDER: ICD-10-CM

## 2018-04-13 NOTE — TELEPHONE ENCOUNTER
Last Written Prescription Date:  3/23/18  Last Fill Quantity: 90,  # refills: 0   Last office visit: 3/13/2018 with prescribing provider:     Future Office Visit:   Next 5 appointments (look out 90 days)     Apr 17, 2018  2:00 PM CDT   Return Visit with ART Arias CNP   [facility] ([facility])    [address]  [address]  [facility] [address]   [phone]            Apr 17, 2018  2:00 PM CDT   Return Visit with Richardson Lopez [facility] ([facility])    [address]  [address]  [facility] [address]   [phone]            Apr 17, 2018  2:00 PM CDT   SHORT with Eugenia De Jesus [facility] MT ([facility])    [address] [place] [address]  [address]  [facility] [address] [phone]            Apr 20, 2018 10:00 AM CDT   Return Visit with Kraig Pedersen MD   [facility] ([facility])    [address]  [facility] [address] [phone]            Apr 25, 2018 10:30 AM CDT   [facility]   [facility] ([facility])    [address]  [address]  [facility] [address]   [phone]                 Last Written Prescription Date:  3/23/18  Last Fill Quantity: 30,  # refills: 0   Last office visit: 3/13/2018 with prescribing provider:     Future Office Visit:   Next 5 appointments (look out 90 days)     Apr 17, 2018  2:00 PM CDT   Return Visit with ART Arias CNP   [facility] ([facility])    [address]  [address]  [facility] [address]   [phone]            Apr 17, 2018  2:00 PM CDT   Return Visit with Richardson Lopez [facility] ([facility])    [address]  [address]  [facility] [address]   [phone]            Apr 17, 2018  2:00 PM CDT   SHORT with Eugenia De Jesus [facility] MTM ([facility])    [address] [place] [address]  [address]  [facility] [address]   [phone]            Apr 20, 2018 10:00 AM CDT   Return Visit with Kraig Pedersen MD   [facility] ([facility])    [address]  [facility] [address]   [phone]            Apr 25, 2018 10:30 AM CDT   Return Visit with Richardson Lopez [facility] ([facility])    [address]   [address]  [facility] [address]   [phone]                 Requested Prescriptions   Pending Prescriptions Disp Refills     trihexyphenidyl (ARTANE) 2 MG tablet 90 tablet 0     Sig: Take 1 tablet (2 mg) by mouth 3 times daily    There is no refill protocol information for this order        paliperidone (INVEGA) 9 MG 24 hr tablet 30 tablet 0     Sig: Take 1 tablet (9 mg) by mouth every morning    There is no refill protocol information for this order

## 2018-04-16 ENCOUNTER — TELEPHONE (OUTPATIENT)
Dept: FAMILY MEDICINE | Facility: CLINIC | Age: 58
End: 2018-04-16

## 2018-04-16 ENCOUNTER — HOSPITAL ENCOUNTER (OUTPATIENT)
Dept: PHYSICAL THERAPY | Facility: CLINIC | Age: 58
Setting detail: THERAPIES SERIES
End: 2018-04-16
Attending: NURSE PRACTITIONER | Admitting: NURSE PRACTITIONER
Payer: MEDICARE

## 2018-04-16 DIAGNOSIS — M62.81 GENERALIZED MUSCLE WEAKNESS: Primary | ICD-10-CM

## 2018-04-16 PROCEDURE — 40000718 ZZHC STATISTIC PT DEPARTMENT ORTHO VISIT: Performed by: PHYSICAL THERAPIST

## 2018-04-16 PROCEDURE — G8979 MOBILITY GOAL STATUS: HCPCS | Mod: GP,CI | Performed by: PHYSICAL THERAPIST

## 2018-04-16 PROCEDURE — 97161 PT EVAL LOW COMPLEX 20 MIN: CPT | Mod: GP | Performed by: PHYSICAL THERAPIST

## 2018-04-16 PROCEDURE — G8978 MOBILITY CURRENT STATUS: HCPCS | Mod: GP,CJ | Performed by: PHYSICAL THERAPIST

## 2018-04-16 PROCEDURE — 97116 GAIT TRAINING THERAPY: CPT | Mod: GP | Performed by: PHYSICAL THERAPIST

## 2018-04-16 PROCEDURE — 97110 THERAPEUTIC EXERCISES: CPT | Mod: GP | Performed by: PHYSICAL THERAPIST

## 2018-04-16 RX ORDER — DULAGLUTIDE 1.5 MG/.5ML
INJECTION, SOLUTION SUBCUTANEOUS
Qty: 2 ML | Refills: 0 | Status: SHIPPED | OUTPATIENT
Start: 2018-04-16 | End: 2018-06-07

## 2018-04-16 NOTE — TELEPHONE ENCOUNTER
Prescription approved per [facility] Refill Protocol.  Jessica Hutchinson RN

## 2018-04-16 NOTE — PROGRESS NOTES
" 04/16/18 1432   Quick Adds   Quick Adds Certification   Type of Visit Initial OP PT Evaluation   General Information   Start of Care Date 04/16/18   Referring Physician Wendy Tang APRN CNP  (PCP is Dr. Rowena Haas)   Orders Evaluate and Treat as Indicated   Additional Orders additional PT orders dated 3/30/18, same diagnosis   Order Date 03/27/18   Medical Diagnosis Decreased mobility R26.89    Onset of illness/injury or Date of Surgery 04/16/17  (pt estimates ~1 yr ago)   Precautions/Limitations no known precautions/limitations   Special Instructions \"Mobility Assessment, mandated by the state\"   Surgical/Medical history reviewed Yes   Pertinent history of current problem (include personal factors and/or comorbidities that impact the POC) Pt presents to PT to address generalized mobility impairment. She reports decline in her heatlh and ability to walk over the past year.  She has been living in a group home setting over the past year, and has fallen several times.  She states she cannot walk > 1 block distances due to weakness and her legs give out.  She reports using a SEC 'sometimes' with gait skills, but that staff want her to use it regularly.  She endorses numbness/tingling in her extremities, possibly related to diabetes.  PMH including depression, psychiatric history (psychosocial insomnia, schizoaffective disorder), osteopenia, RTC issues, chronic LBP, auditory hallucinations, migraines, cervicalgia, HTN, Tardive Dyskinesia, and diabetes.     Prior level of function comment Indep PLOF, ambulates independently without AD but does own SEC she has been recommended to use.  Reports she has supervision with bathing due to fear of falling in shower.   Current Community Support ( facility staff)   Patient role/Employment history Disabled   Living environment Group home   Home/Community Accessibility Comments Currently on SSDI living in [facility] setting.  Single " "level home with no stairs, staffed 24/7.   Current Assistive Devices Standard Cane   ADL Devices Shower/Tub Grab Bar   Assistive Devices Comments Uses SEC with gait skills at times, not consistently.  Has walk-in shower with grab bars   Patient/Family Goals Statement Pt hopes to improve her strength and ability to walk for longer distances.  She does indicate she would prefer to obtain a 4WW, which would likely improve her walking tolerance.   Fall Risk Screen   Fall screen completed by PT   Have you fallen 2 or more times in the past year? Yes   Have you fallen and had an injury in the past year? Yes  (abrasions, \"cracked ribs\")   Timed Up and Go score (seconds) 10.16   Is patient a fall risk? Yes;Department fall risk interventions implemented   Fall screen comments Fall risk as evidenced by DGI score of 17/24 and hx of falls.   Pain   Patient currently in pain Yes   Pain location Low back and B knees   Pain rating 6/10   Pain description Discomfort   Pain description comment increases with activity/walking   Cognitive Status Examination   Orientation orientation to person, place and time   Integumentary   Integumentary Comments very mild generalized swelling B lower legs, otherwise skin appears intact   Posture   Posture Forward head position;Protracted shoulders   Palpation   Palpation mild TTP B lower legs at ankles, presumably due to mild swelling   Range of Motion (ROM)   ROM Comment WFL BUEs and BLEs.  End range limitations B hip flexion and IR/ER. Limited spinal extension noted in standing. AROM B shoulders > 120 deg.   Strength   Strength Comments Generalized weakness/deconditioning evident, symmetrical L vs R.  B ankle DF 4+/5, knee extension 4+/5, knee flexion 4/5, hip flexion 4-/5, hip abduction 3+/5. Symmetrical  strength, mild weakness B shoulder flexion.  Fatigues quickly with standing/walking activity.   Bed Mobility   Bed Mobility Comments Indep, needing multiple attempts to come to sitting " though, difficulty noted.   Transfer Skills   Transfer Comments Independent sit <> stand, able to perform without use of UEs, stablizes independently.   Gait   Gait Comments Amb with mild forward flexed posture, slowed gait speed, decreased foot clearance B.  Limited arm swing and trunk rotation.  Amb distances limited to < 1 block due to fatigue.   Gait Special Tests   Gait Special Tests 25 FOOT TIMED WALK;DYNAMIC GAIT INDEX   Gait Special Tests 25 Foot Timed Walk   Seconds 10.69   Comments no AD, equivalent to ~0.7 m/sec gait speed.  Normative for age/sex would be > 1.2 m/sec.   Gait Special Tests Dynamic Gait Index   Score out of 24 17   Comments fall risk indicitive of scores < 19/24.   Balance   Balance Comments Impaired dynamic gait stability, 17/24 on DGI indicated fall risk.  WNL static standing balance.   Balance Special Tests   Balance Special Tests Timed up and go;Romberg;Sit to stand reps   Balance Special Tests Timed Up and Go   Seconds 10.16 Seconds   Comments no AD.  >13.5 sec indicates risk for falls.  Normative for age/sex is <8 sec.   Balance Special Tests Romberg   Seconds 30 Seconds   Comments EO and EC conditions x 30 sec, mild/moderate sway EC, no LOB   Balance Special Tests Sit to Stand Reps in 30 Seconds   Reps in 30 seconds 9   Height 18'' chair   Comments no use of hands, limited by fatigue in legs, generalized LBP/knee pain and SOB noted   Sensory Examination   Sensory Perception Comments numbness/tingling reported B feet and B hand digits 3-5, intact to gross and light touch   Muscle Tone   Muscle Tone no deficits were identified   Muscle Tone Comments involuntary movements of mouth/tongue noted, hx of TD   Modality Interventions   Planned Modality Interventions Comments as indicated per therapist discretion   Planned Therapy Interventions   Planned Therapy Interventions balance training;gait training;neuromuscular re-education;ROM;strengthening;stretching;transfer training;manual therapy    Clinical Impression   Criteria for Skilled Therapeutic Interventions Met yes, treatment indicated   PT Diagnosis Functional weakness and impaired mobility   Influenced by the following impairments Generalized LBP, knee pain, BLE weakness especially proximally at hips, neuropathy, impaired balance   Functional limitations due to impairments Impaired tolerance with community functional ambulation distances, fall risk, impaired tolerance on stairs, impaired ability to complete ADLs and basic mobility skills on her own safely   Clinical Presentation Stable/Uncomplicated   Clinical Presentation Rationale complex PMH, but relativel stable symptoms, fall hx, current clinical presentation   Clinical Decision Making (Complexity) Low complexity   Therapy Frequency other (see comments)  (2x/wk x 3 wks, then 1x/wk x 3 wks, dec freq as appropriate)   Predicted Duration of Therapy Intervention (days/wks) 6 wks   Risk & Benefits of therapy have been explained Yes   Patient, Family & other staff in agreement with plan of care Yes   Education Assessment   Preferred Learning Style Listening;Reading;Demonstration;Pictures/video   Barriers to Learning Cognitive   GOALS   PT Eval Goals 1;2;3;4   Goal 1   Goal Identifier TUG   Goal Description Nena will demo TUG with least restrictive AD in 8 sec or less to demonstrate improving strength, balance, and gait speed, as well as lower risk for falls (baseline score 10.16'', no AD)   Target Date 06/08/18   Goal 2   Goal Identifier Gait Speed   Goal Description Nena will demo 25' Gait assessment with LRAD in <7.62 sec to show gait speed of at least 1.0 m/sec needed for improved ambulation in community environments (baseline 25' Gait assessment 10.69 sec equivalent to ~0.7 m/sec).   Target Date 06/08/18   Goal 3   Goal Identifier Activity   Goal Description Nena will demonstrate walking with LRAD or use of Nu-Step machine x 8 minutes continuously for improved strength and ability to  comply with regular walking program (baseline limited to <2:30).   Target Date 06/08/18   Goal 4   Goal Identifier HEP   Goal Description Nena will demo (I) with HEP for continued improvement of strength and safety with gait/mobility skills.   Target Date 06/08/18   Total Evaluation Time   Total Evaluation Time (Minutes) 30   Therapy Certification   Certification date from 04/16/18   Certification date to 06/08/18   Medical Diagnosis Decreased mobility R26.89

## 2018-04-16 NOTE — TELEPHONE ENCOUNTER
Kiko Gallego, PT, DPT ; Recommendation for patient to use a 4 wheeled- walker due to generalized weakness, unsteady gait and fall history.   Order completed.   ART Oliver CNP

## 2018-04-16 NOTE — PROGRESS NOTES
[facility]        OUTPATIENT PHYSICAL THERAPY FUNCTIONAL EVALUATION  PLAN OF TREATMENT FOR OUTPATIENT REHABILITATION  (COMPLETE FOR INITIAL CLAIMS ONLY)  Patient's Last Name, First Name, M.I.  YOB: 1960  Nena Tang     Provider's Name   [facility]   Medical Record No.  [id number]     Start of Care Date:  04/16/18   Onset Date:  04/16/17 (pt estimates ~1 yr ago)   Type:     _X__PT   ____OT  ____SLP Medical Diagnosis:  Decreased mobility R26.89      PT Diagnosis:  Functional weakness and impaired mobility Visits from SOC:  1                              __________________________________________________________________________________  Plan of Treatment/Functional Goals:  balance training, gait training, neuromuscular re-education, ROM, strengthening, stretching, transfer training, manual therapy           GOALS  TUG  Nena will demo TUG with least restrictive AD in 8 sec or less to demonstrate improving strength, balance, and gait speed, as well as lower risk for falls (baseline score 10.16'', no AD)  06/08/18    Gait Speed  Nena will demo 25' Gait assessment with LRAD in <7.62 sec to show gait speed of at least 1.0 m/sec needed for improved ambulation in community environments (baseline 25' Gait assessment 10.69 sec equivalent to ~0.7 m/sec).  06/08/18    Activity  Nena will demonstrate walking with LRAD or use of Nu-Step machine x 8 minutes continuously for improved strength and ability to comply with regular walking program (baseline limited to <2:30).  06/08/18    HEP  Nena will demo (I) with HEP for continued improvement of strength and safety with gait/mobility skills.  06/08/18       Therapy Frequency:  other (see comments) (2x/wk x 3 wks, then 1x/wk x 3 wks, dec freq as appropriate)   Predicted Duration of Therapy Intervention:  6  tiffanie Gallego, CISCO                                    I CERTIFY THE NEED FOR THESE SERVICES FURNISHED UNDER        THIS PLAN OF TREATMENT AND WHILE UNDER MY CARE     (Physician co-signature of this document indicates review and certification of the therapy plan).                Certification Date From:  04/16/18   Certification Date To:  06/08/18    Referring Provider:  Wendy Tang APRN CNP (PCP is Dr. Rowena Haas)    Initial Assessment  See Epic Evaluation- Start of Care Date: 04/16/18

## 2018-04-17 ENCOUNTER — OFFICE VISIT (OUTPATIENT)
Dept: BEHAVIORAL HEALTH | Facility: CLINIC | Age: 58
End: 2018-04-17
Payer: MEDICARE

## 2018-04-17 ENCOUNTER — OFFICE VISIT (OUTPATIENT)
Dept: FAMILY MEDICINE | Facility: CLINIC | Age: 58
End: 2018-04-17
Payer: MEDICARE

## 2018-04-17 ENCOUNTER — OFFICE VISIT (OUTPATIENT)
Dept: PHARMACY | Facility: CLINIC | Age: 58
End: 2018-04-17
Payer: COMMERCIAL

## 2018-04-17 VITALS
BODY MASS INDEX: 45.55 KG/M2 | HEART RATE: 82 BPM | TEMPERATURE: 97.4 F | WEIGHT: 234 LBS | OXYGEN SATURATION: 96 % | DIASTOLIC BLOOD PRESSURE: 64 MMHG | SYSTOLIC BLOOD PRESSURE: 98 MMHG

## 2018-04-17 DIAGNOSIS — K59.00 CONSTIPATION, UNSPECIFIED CONSTIPATION TYPE: ICD-10-CM

## 2018-04-17 DIAGNOSIS — E11.65 TYPE 2 DIABETES MELLITUS WITH HYPERGLYCEMIA, WITH LONG-TERM CURRENT USE OF INSULIN (H): ICD-10-CM

## 2018-04-17 DIAGNOSIS — Z79.4 TYPE 2 DIABETES MELLITUS WITH HYPERGLYCEMIA, WITH LONG-TERM CURRENT USE OF INSULIN (H): ICD-10-CM

## 2018-04-17 DIAGNOSIS — F25.1 SCHIZOAFFECTIVE DISORDER, DEPRESSIVE TYPE (H): ICD-10-CM

## 2018-04-17 DIAGNOSIS — F25.1 SCHIZOAFFECTIVE DISORDER, DEPRESSIVE TYPE (H): Primary | ICD-10-CM

## 2018-04-17 DIAGNOSIS — R45.851 SUICIDAL IDEATION: ICD-10-CM

## 2018-04-17 DIAGNOSIS — F43.10 POSTTRAUMATIC STRESS DISORDER: ICD-10-CM

## 2018-04-17 DIAGNOSIS — L73.9 FOLLICULITIS: ICD-10-CM

## 2018-04-17 DIAGNOSIS — I25.119 CORONARY ARTERY DISEASE INVOLVING NATIVE HEART WITH ANGINA PECTORIS, UNSPECIFIED VESSEL OR LESION TYPE (H): Primary | ICD-10-CM

## 2018-04-17 DIAGNOSIS — I10 HTN, GOAL BELOW 140/90: Primary | ICD-10-CM

## 2018-04-17 PROCEDURE — 99607 MTMS BY PHARM ADDL 15 MIN: CPT | Performed by: PHARMACIST

## 2018-04-17 PROCEDURE — 90832 PSYTX W PT 30 MINUTES: CPT | Performed by: SOCIAL WORKER

## 2018-04-17 PROCEDURE — 99215 OFFICE O/P EST HI 40 MIN: CPT | Performed by: NURSE PRACTITIONER

## 2018-04-17 PROCEDURE — 99606 MTMS BY PHARM EST 15 MIN: CPT | Performed by: PHARMACIST

## 2018-04-17 RX ORDER — PALIPERIDONE 9 MG/1
9 TABLET, EXTENDED RELEASE ORAL EVERY MORNING
Qty: 30 TABLET | Refills: 0 | Status: SHIPPED | OUTPATIENT
Start: 2018-04-17 | End: 2018-04-20

## 2018-04-17 RX ORDER — TRIHEXYPHENIDYL HYDROCHLORIDE 2 MG/1
2 TABLET ORAL 3 TIMES DAILY
Qty: 90 TABLET | Refills: 0 | Status: SHIPPED | OUTPATIENT
Start: 2018-04-17 | End: 2018-04-20

## 2018-04-17 RX ORDER — DOXYCYCLINE 100 MG/1
100 CAPSULE ORAL 2 TIMES DAILY
Qty: 14 CAPSULE | Refills: 0 | Status: SHIPPED | OUTPATIENT
Start: 2018-04-17 | End: 2018-06-04

## 2018-04-17 RX ORDER — METOPROLOL SUCCINATE 50 MG/1
50 TABLET, EXTENDED RELEASE ORAL DAILY
Qty: 60 TABLET | Refills: 3 | Status: SHIPPED | OUTPATIENT
Start: 2018-04-17 | End: 2018-06-04

## 2018-04-17 RX ORDER — POLYETHYLENE GLYCOL 3350 17 G/17G
POWDER, FOR SOLUTION ORAL
Qty: 527 G | Refills: 3 | Status: SHIPPED | OUTPATIENT
Start: 2018-04-17 | End: 2018-12-17

## 2018-04-17 NOTE — MR AVS SNAPSHOT
After Visit Summary   4/17/2018    Nena Tang    MRN: [id number]           Patient Information     Date Of Birth          1960        Visit Information        Provider Department      4/17/2018 2:00 PM Eugenia De Jesus, [facility]        Today's Diagnoses     HTN, goal below 140/90    -  1    Type 2 diabetes mellitus with hyperglycemia, with long-term current use of insulin (H)        Schizoaffective disorder, depressive type (H)        Constipation, unspecified constipation type          Care Instructions    See AVS from PCP encounter for details           Follow-ups after your visit        Your next 10 appointments already scheduled     Apr 25, 2018  8:45 AM CDT   RETURN RETINA with Tiburcio Chacon MD   [facility] ([facility])    [address]  [facility] [address]   [phone]            Apr 25, 2018 10:30 AM CDT   Return Visit with Richardson Lopez [facility] ([facility])    [address]  [address]  [facility] [address]   [phone]            Apr 30, 2018 10:30 AM CDT   Ortho Treatment with Kiko Lamar, PT   [facility] ([facility])    [address] [address]   [phone]            May 02, 2018 10:00 AM CDT   Ortho Treatment with Kiko Julián, PT   [facility] ([facility])    [address] [address]   [phone]            May 07, 2018 10:30 AM CDT   Ortho Treatment with Kiko Julián, PT   [facility] ([facility])    [address] [address]   [phone]            May 09, 2018 10:00 AM CDT   Ortho Treatment with Kiko Julián, PT   [facility] ([facility])     [address] [facility] [address]   [phone]            May 14, 2018 10:30 AM CDT   Ortho Treatment with Kiko Gallego, PT   [facility] ([facility])    [address] [facility] [address]   [phone]            May 16, 2018 10:45 AM CDT   Ortho Treatment with Kiko Gallego, PT   [facility] ([facility])    [address] [facility] [address]   [phone]            May 22, 2018  1:30 PM CDT   Return Visit with ART Arias CNP   [facility] ([facility])    [address]  [address]  [facility] [id number] [phone]            May 22, 2018  1:30 PM CDT   Return Visit with BIN Mondragon   [facility] ([facility])    [address]  [address]  [facility] [id number] [phone]              Who to contact     If you have questions or need follow up information about today's clinic visit or your schedule please contact [facility] directly at [phone].  Normal or non-critical lab and imaging results will be communicated to you by MyChart, letter or phone within 4 business days after the clinic has received the results. If you do not hear from us within 7 days, please contact the clinic through MyChart or phone. If you have a critical or abnormal lab result, we will notify you by phone as soon as possible.  Submit refill requests through Jigsee or call your pharmacy and they will forward the refill request to us. Please allow 3 business days for your refill to be completed.          Additional Information About Your Visit        Dizmohart Information     Jigsee gives you secure access to your electronic health record. If you see a primary care provider, you can also send messages to your care team and make  appointments. If you have questions, please call your primary care clinic.  If you do not have a primary care provider, please call [phone] and they will assist you.        Care EveryWhere ID     This is your Care EveryWhere ID. This could be used by other organizations to access your [place] medical records  [id number]        Your Vitals Were     Last Period                   01/06/2015            Blood Pressure from Last 3 Encounters:   04/17/18 98/64   03/13/18 112/72   02/12/18 110/62    Weight from Last 3 Encounters:   04/17/18 234 lb (106.1 kg)   03/13/18 236 lb (107 kg)   02/12/18 228 lb 8 oz (103.6 kg)              Today, you had the following     No orders found for display         Today's Medication Changes          These changes are accurate as of 4/17/18 11:59 PM.  If you have any questions, ask your nurse or doctor.               Start taking these medicines.        Dose/Directions    doxycycline 100 MG capsule   Commonly known as:  VIBRAMYCIN   Used for:  Folliculitis   Started by:  Rowena Haas APRN CNP        Dose:  100 mg   Take 1 capsule (100 mg) by mouth 2 times daily   Quantity:  14 capsule   Refills:  0         These medicines have changed or have updated prescriptions.        Dose/Directions    metoprolol succinate 50 MG 24 hr tablet   Commonly known as:  TOPROL-XL   This may have changed:  See the new instructions.   Used for:  Coronary artery disease involving native heart with angina pectoris, unspecified vessel or lesion type (H)   Changed by:  Rowena Haas APRN CNP        Dose:  50 mg   Take 1 tablet (50 mg) by mouth daily   Quantity:  60 tablet   Refills:  3       polyethylene glycol powder   Commonly known as:  MIRALAX/GLYCOLAX   This may have changed:  See the new instructions.   Used for:  Constipation, unspecified constipation type   Changed by:  Rowena Haas APRN CNP        TAKE 8.5 GRAMS (1/2 CAPFUL) BY MOUTH DAILY   Quantity:  527 g   Refills:  3             Where to get your medicines      These medications were sent to [address]  [address] [facility] [address]     Phone:  [phone]     doxycycline 100 MG capsule    metoprolol succinate 50 MG 24 hr tablet    polyethylene glycol powder                Primary Care Provider Office Phone # Fax #    ART Arias [phone]       [address] [facility] [address]  [facility] [address]        Goals        General    Medical (pt-stated)     Notes - Note created  7/7/2016  9:15 AM by Chelsea Pulido, RN    Get blood sugars under control    Improve medication compliance    As of today's date 7/7/2016 goal is met at 0 - 25%.   Goal Status:  Active          Equal Access to Services     RAMESH GARRIDO : Hadii aad svitlana hadasho Soomaali, waaxda luqadaha, qaybta kaalmada adeegyada, waxmichelle ellison . So [facility] [phone].    ATENCIÓN: Si habla español, tiene a trinh disposición servicios gratuitos de asistencia lingüística. Smith al [phone].    We comply with applicable federal civil rights laws and [place] laws. We do not discriminate on the basis of race, color, national origin, age, disability, sex, sexual orientation, or gender identity.            Thank you!     Thank you for choosing [facility]  for your care. Our goal is always to provide you with excellent care. Hearing back from our patients is one way we can continue to improve our services. Please take a few minutes to complete the written survey that you may receive in the mail after your visit with us. Thank you!             Your Updated Medication List - Protect others around you: Learn how to safely use, store and throw away your medicines at www.disposemymeds.org.          This list is accurate as of 4/17/18 11:59 PM.  Always use your most recent med list.                   Brand Name Dispense Instructions for  use Diagnosis    * ACETAMINOPHEN PO      Take 1,000 mg by mouth every 6 hours as needed for pain or fever        * acetaminophen 500 MG tablet    TYLENOL    100 tablet    Take 2 tablets (1,000 mg) by mouth 3 times daily as needed for mild pain    Chronic low back pain, unspecified back pain laterality, with sciatica presence unspecified       albuterol 108 (90 Base) MCG/ACT Inhaler    PROAIR HFA/PROVENTIL HFA/VENTOLIN HFA    3 Inhaler    Inhale 2 puffs into the lungs every 6 hours as needed for shortness of breath / dyspnea or wheezing    Dyspnea on exertion       aspirin 81 MG EC tablet     28 tablet    TAKE 1 TABLET (81MG) BY MOUTH DAILY    Coronary artery disease involving native heart with angina pectoris, unspecified vessel or lesion type (H)       atorvastatin 80 MG tablet    LIPITOR    28 tablet    TAKE 1 TABLET (80MG) BY MOUTH DAILY    Hyperlipidemia LDL goal <100       blood glucose monitoring lancets     150 each    Use to test blood sugar 2 times daily or as directed.  Ok to substitute alternative if insurance prefers.    Type 2 diabetes mellitus with diabetic neuropathy, with long-term current use of insulin (H)       blood glucose monitoring test strip    ONETOUCH VERIO IQ    200 strip    Use to test blood sugar 4 times daily .  Ok to substitute alternative if insurance prefers.    Type 2 diabetes mellitus with diabetic neuropathy, with long-term current use of insulin (H)       calcium carbonate 1500 (600 Ca) MG tablet    OS-ALLEN 600 mg [place]. Ca    180 tablet    Take 1 tablet (1,500 mg) by mouth daily    Other osteoporosis without current pathological fracture       clotrimazole 1 % cream    LOTRIMIN     Apply topically 2 times daily        DIPHENDRYL PO      Take 25 mg by mouth every 6 hours as needed for itching        doxycycline 100 MG capsule    VIBRAMYCIN    14 capsule    Take 1 capsule (100 mg) by mouth 2 times daily    Folliculitis       gabapentin 300 MG capsule    NEURONTIN    168 capsule     TAKE 2 CAPSULES (600MG) BY MOUTH THREE TIMES A DAY    Type 2 diabetes mellitus with diabetic neuropathy, with long-term current use of insulin (H)       glucose 4 g Chew chewable tablet     20 tablet    Take 2 every 15 minutes for blood sugar <70mg/dL. Recheck blood sugar every 15 minutes until above 70mg/dL, then eat a substantial meal.    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, with long-term current use of insulin, unspecified laterality (H)       insulin pen needle 30G X 8 MM    NOVOFINE 30    400 each    USE 4 DAILY OR AS DIRECTED    Type 2 diabetes mellitus with mild nonproliferative retinopathy without macular edema, with long-term current use of insulin, unspecified laterality (H)       losartan 100 MG tablet    COZAAR    28 tablet    TAKE 1 TABLET (100MG) BY MOUTH DAILY    Coronary artery disease involving native heart with angina pectoris, unspecified vessel or lesion type (H)       melatonin 5 MG Caps     180 capsule    Take 2 capsules by mouth daily At dinnertime    Insomnia, unspecified type       metoprolol succinate 50 MG 24 hr tablet    TOPROL-XL    60 tablet     Take 1 tablet (50 mg) by mouth daily    Coronary artery disease involving native heart with angina pectoris, unspecified vessel or lesion type (H)       * order for DME     1 each    Hold Novolog if meals are refused.    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    Generalized muscle weakness       polyethylene glycol powder    MIRALAX/GLYCOLAX    527 g    TAKE 8.5 GRAMS (1/2 CAPFUL) BY MOUTH DAILY    Constipation, unspecified constipation type       prochlorperazine 5 MG tablet    COMPAZINE    90 tablet    Take 1 tablet (5 mg) by mouth every 6 hours as needed for nausea or vomiting    Nausea       ranitidine 300 MG tablet    ZANTAC    90 tablet    TAKE 1 TABLET (300MG) BY MOUTH AT BEDTIME    Gastroesophageal reflux disease without esophagitis       senna-docusate 8.6-50 MG per tablet    SENOKOT-S;PERICOLACE     Take 1 tablet by mouth 2 times daily as needed for constipation        SUMAtriptan 25 MG tablet    IMITREX    12 tablet    Take 1-2 tablets (25-50 mg) by mouth at onset of headache for migraine May repeat in 2 hours. Max 8 tablets/24 hours.    Migraine with aura and without status migrainosus, not intractable       trihexyphenidyl 2 MG tablet    ARTANE    90 tablet    Take 1 tablet (2 mg) by mouth 3 times daily    Schizoaffective disorder, bipolar type (H), Extrapyramidal and movement disorder       TRULICITY 1.5 MG/0.5ML pen   Generic drug:  dulaglutide     2 mL    INJECT 1.5MG SUBCUTANEOUSLY EVERY SEVEN DAYS    Type 2 diabetes mellitus with mild nonproliferative retinopathy without macular edema,  with long-term current use of insulin, unspecified laterality (H)       vitamin D3 75027 UNITS capsule    CHOLECALCIFEROL    12 capsule    TAKE 1 CAPSULE (50,000 UNITS) MONTHLY    Vitamin D deficiency       * Notice:  This list has 4 medication(s) that are the same as other medications prescribed for you. Read the directions carefully, and ask your doctor or other care provider to review them with you.

## 2018-04-17 NOTE — TELEPHONE ENCOUNTER
Requested Prescriptions   Pending Prescriptions Disp Refills     trihexyphenidyl (ARTANE) 2 MG tablet 90 tablet 0     Sig: Take 1 tablet (2 mg) by mouth 3 times daily    There is no refill protocol information for this order        paliperidone (INVEGA) 9 MG 24 hr tablet 30 tablet 0     Sig: Take 1 tablet (9 mg) by mouth every morning    There is no refill protocol information for this order

## 2018-04-17 NOTE — PROGRESS NOTES
[facility]   April 17, 2018      Behavioral Health Clinician Progress Note    Patient Name: Nena Tang           Service Type:  Individual      Service Location:   Face to Face in Clinic     Session Start Time: 2:15pm  Session End Time: 2:38pm      Session Length: 16 - 37      Attendees: Patient    Visit Activities (Refresh list every visit): [facility] [employer]    Diagnostic Assessment Date: 1-23-18  Treatment Plan Review Date: Not completed yet  See Flowsheets for today's PHQ-9 and TAMIA-7 results  Previous PHQ-9:   PHQ-9 SCORE 1/2/2017 2/3/2017 3/13/2018   Total Score - - -   Total Score - - -   Total Score 0 0 14     Previous TAMIA-7:   TAMIA-7 SCORE 1/2/2017 2/3/2017 3/13/2018   Total Score - - -   Total Score 0 0 17   Total Score - - -       ALEXANDRA LEVEL:  ALEXANDRA Score (Last Two) 8/30/2011 6/9/2014   ALEXANDRA Raw Score 42 37   Activation Score 66 49.9   ALEXANDRA Level 3 2       DATA  Extended Session (60+ minutes): No  Interactive Complexity: No  Crisis: No  [facility] Patient: No    Treatment Objective(s) Addressed in This Session:  Target Behavior(s): disease management/lifestyle changes mental health    Depressed Mood: Increase interest, engagement, and pleasure in doing things  Decrease frequency and intensity of feeling down, depressed, hopeless  Improve quantity and quality of night time sleep / decrease daytime naps  Feel less tired and more energy during the day   Identify negative self-talk and behaviors: challenge core beliefs, myths, and actions  Improve concentration, focus, and mindfulness in daily activities   Decrease thoughts that you'd be better off dead or of suicide / self-harm  Anxiety: will experience a reduction in anxiety, will develop more effective coping skills to manage anxiety symptoms, will develop healthy cognitive patterns and beliefs and will increase ability to function adaptively  Alcohol / Substance Use: continue to make healthy choices regarding substance use and engage in  activities / supportive services that promote sobriety  Thought Disturbance: will develop skills to more effectively manage symptoms, will develop more effective support systems and will continue to take medications as prescribed    Current Stressors / Issues:    The patient reported that it is hard to fall asleep-wakes during the night-having good dreams and bad dreams-she continues to have auditory and visual hallucination and she is able to ignore him successfully-regarding mood she has suicidal thoughts and she stated that eventually she will attempt suicide-we had discussion about suicide and how it is covered by her belief system and how she does not want the spiritual consequences of suicide-we had discussion of taking suicide off the table for ways of coping due to the spiritual consequences-she agreed to meet with this [facility] soon for individual visit.           Progress on Treatment Objective(s) / Homework:  No improvement - PREPARATION (Decided to change - considering how); Intervened by negotiating a change plan and determining options / strategies for behavior change, identifying triggers, exploring social supports, and working towards setting a date to begin behavior change    Motivational Interviewing    MI Intervention: Expressed Empathy/Understanding, Supported Autonomy, Collaboration, Evocation, Permission to raise concern or advise, Open-ended questions, Reflections: simple and complex, Rolled with resistance: Emphasized patient autonomy, Simple reflection and Evoked patient agenda and Change talk (evoked)     Change Talk Expressed by the Patient: Desire to change Ability to change Reasons to change Need to change Committment to change Activation Taking steps    Provider Response to Change Talk: E - Evoked more info from patient about behavior change, A - Affirmed patient's thoughts, decisions, or attempts at behavior change, R - Reflected patient's change talk and S - Summarized patient's change  talk statements      Care Plan review completed: No    Medication Review:  No changes to current psychiatric medication(s)   Medication Compliance:  NA    Changes in Health Issues:   None reported     Chemical Use Review:   Substance Use: Chemical use reviewed, no active concerns identified      Tobacco Use: No current tobacco use.      Assessment: Current Emotional / Mental Status (status of significant symptoms):  Risk status (Self / Other harm or suicidal ideation)  Patient has had a history of suicidal ideation: yes  Patient denies current fears or concerns for personal safety.  Patient reports the following current or recent suicidal ideation or behaviors: no intent to harm the self at this time.  Patient denies current or recent homicidal ideation or behaviors.  Patient denies current or recent self injurious behavior or ideation.  Patient denies other safety concerns.  A safety and risk management plan has not been developed at this time, however patient was encouraged to call [place] [phone] should there be a change in any of these risk factors.    Appearance:   Appropriate   Eye Contact:   Good   Psychomotor Behavior: Normal   Attitude:   Cooperative   Orientation:   All  Speech   Rate / Production: Normal    Volume:  Normal   Mood:    Depressed  Normal  Affect:    Appropriate  Blunted   Thought Content:  Clear   Thought Form:  Coherent   Insight:    Fair     Diagnoses:  1. Schizoaffective disorder, depressive type (H)    2. Suicidal ideation    3. Posttraumatic stress disorder        Collateral Reports Completed:  Not Applicable    Plan: (Homework, other):  Patient was given information about behavioral services and encouraged to schedule a follow up appointment with the clinic [facility] as needed.  She was also given information about mental health symptoms and treatment options .  CD Recommendations: Maintain Sobriety.    BIN George,  [facility]      ______________________________________________________________________

## 2018-04-17 NOTE — PROGRESS NOTES
SUBJECTIVE/OBJECTIVE:                Nena Tang is a 58 year old female coming in for a follow-up visit for Medication Therapy Management.  She was referred to me from Rowena Haas. Seen as a covisit with PCP and Richardson Lopez [facility] [place]. Accompanied by group home staff.     Chief Complaint: Follow up from our visit on 3/13/18.    Tobacco: No tobacco use  Alcohol: not currently using     Medication Adherence/Access:  no issues reported, set up and administered by group home staff, except when pt leaves group home (ex lunches at [facility] in [place], trip to [place] to see her sister)    Diabetes:  Pt currently taking lantus 48u QHS, novolog 20u TID with meals, and trulicity 1.5mg subcutaneously each week.  Pt is not experiencing side effects.  SMBG: four times daily. Ranges (from patient's glucose log):  Date FBG/ 2hours post Lunch/2hours post Dinner /2hours post Bedtime   4/16 120 117 176 160   4/15 132 223 187 150    136 155 146 243    151 126 229 245    124 116 260 142    195 153 177 155    150 112 379 346   Patient is rarely experiencing hypoglycemia (lowest BG in past month was 76)  Recent symptoms of high blood sugar? none  Eye exam: up to date  Foot exam: up to date  Microalbumin is < 30 mg/g. Pt is taking an ACEi/ARB.  Aspirin: Taking 81mg daily and denies side effects  Diet/Exercise: Tries to eat a lot of vegetables (greens, cauliflower, and broccoli), occasional sweets and chips; walks a lot at the [facility] center but gets tired after walking too far.  Plans to get some walking done outside once the weather improves.      Depression/PTSD/schizoaffective: Currently taking invega 9mg every morning and artane 2mg TID (increased dose), also restarted on sertraline and taking 100mg daily.  Less tearful and sad today. Visits drop-in center every day to distract (cards, movies, and parties).   Continues to have hallucinations, visual and auditory. Difficulty sleeping due to hallucination of man in her room. See [facility] [place]  note for additional details.  SE: bothersome leg tremor has improved dramatically with discontinuation of Haldol. Pt has noticeable tongue thrusting today, less prevalent with dose increase in Artane. Pt also notes improvement and denies SE to Artane.     Hypertension: Current medications include losartan 100mg daily, metoprolol XL 100mg daily.  Patient does not self-monitor BP.  Patient reports no current medication side effects.    Constipation: currently taking Miralax 1 capful most days but having to hold medication due to loose stools at times. Has not needed to take any senna-docusate recently.     Current labs include:    BP Readings from Last 3 Encounters:   04/17/18 98/64   03/13/18 112/72   02/12/18 110/62     Lab Results   Component Value Date    A1C 6.8 02/12/2018   .  Lab Results   Component Value Date    CHOL 154 06/27/2017     Lab Results   Component Value Date    TRIG 267 06/27/2017     Lab Results   Component Value Date    HDL 37 06/27/2017     Lab Results   Component Value Date    LDL 64 06/27/2017       Liver Function Studies -   Recent Labs   Lab Test  02/08/18   2155   PROTTOTAL  8.0   ALBUMIN  3.7   BILITOTAL  0.2   ALKPHOS  99   AST  12   ALT  23       Lab Results   Component Value Date    UCRR 160 06/27/2017    MICROL 11 06/27/2017    UMALCR 6.81 06/27/2017       Last Basic Metabolic Panel:  Lab Results   Component Value Date     02/08/2018      Lab Results   Component Value Date    POTASSIUM 4.3 02/08/2018     Lab Results   Component Value Date    CHLORIDE 103 02/08/2018     Lab Results   Component Value Date    BUN 19 02/08/2018     Lab Results   Component Value Date    CR 1.09 02/08/2018     GFR Estimate   Date Value Ref Range Status   02/08/2018 52 (L) >60 mL/min/1.7m2 Final     Comment:     Non  GFR Calc   01/23/2018 64 >60 mL/min/1.7m2 Final     Comment:     Non  GFR Calc   01/14/2018 51 (L) >60 mL/min/1.7m2 Final     Comment:     Non   American GFR Calc     GFR Estimate If Black   Date Value Ref Range Status   02/08/2018 63 >60 mL/min/1.7m2 Final     Comment:      GFR Calc   01/23/2018 77 >60 mL/min/1.7m2 Final     Comment:      GFR Calc   01/14/2018 61 >60 mL/min/1.7m2 Final     Comment:      GFR Calc     TSH   Date Value Ref Range Status   11/07/2017 3.21 0.40 - 4.00 mU/L Final   ]    Most Recent Immunizations   Administered Date(s) Administered     Influenza (IIV3) PF 09/24/2012     Influenza Vaccine IM 3yrs+ 4 Valent IIV4 11/06/2017     Mantoux Tuberculin Skin Test 07/10/2014     Pneumococcal 23 valent 01/22/2009     TDAP Vaccine (Adacel) 01/22/2009       ASSESSMENT:              Current medications were reviewed today as discussed above.      Medication Adherence: excellent, no issues identified    diabetes: improved. A1c at goal <7%. OK to reduce BS testing per pt preference.    Depression/PTSD/schizoaffective: improved. PT will f/u with psychiatry as scheduled.    Hypertension: Stable. Patient is meeting BP goal of < 140/90mmHg. BP is well below goal consistently for the last few months; will reduce dose of metoprolol.     Constipation: improved, however dose of Miralax seems too high, will reduce.     PLAN:                  Decrease metoprolol XL to 50mg daily  Decrease BS testing to TID  Decrease Miralax to 1/2 capful daily    I spent 30 minutes with this patient today. All changes were made via collaborative practice agreement with Rowena Haas. A copy of the visit note was provided to the patient's primary care provider.     Will follow up in 1-2 months.    The patient was given a summary of these recommendations as an after visit summary.    Eugenia De Jesus, PharmD, BCACP

## 2018-04-17 NOTE — MR AVS SNAPSHOT
After Visit Summary   4/17/2018    Nena Tang    MRN: [id number]           Patient Information     Date Of Birth          1960        Visit Information        Provider Department      4/17/2018 2:00 PM Richardson Lopez, [facility]        Today's Diagnoses     Schizoaffective disorder, depressive type (H)    -  1    Suicidal ideation        Posttraumatic stress disorder           Follow-ups after your visit        Your next 10 appointments already scheduled     Apr 20, 2018  9:30 AM CDT   Return Visit with Richardson Lopez [facility] Care ([facility])    [address]  [address]  [facility] [address]   [phone]            Apr 20, 2018 10:00 AM CDT   Return Visit with Kraig Pedersen MD   [facility] ([facility])    [address]  [facility] [address]   [phone]            Apr 25, 2018  8:45 AM CDT   RETURN RETINA with Tiburcio Chacon MD   [facility] ([facility])    [address]  [facility] [address]   [phone]            Apr 25, 2018 10:30 AM CDT   Return Visit with Richardson Lopez [facility] ([facility])    [address]  [address]  [facility] [address]   [phone]            Apr 30, 2018 10:30 AM CDT   Ortho Treatment with Kiko Julián, PT   [facility] ([facility])    [address] [facility] [address]   [phone]            May 02, 2018 10:00 AM CDT   Ortho Treatment with Kiko [place], PT   [facility] ([facility])    [address] [facility] [address]   [phone]            May 07, 2018 10:30 AM CDT   Ortho Treatment with Kiko [place],  PT   [facility] ([facility])    [address] [facility] [address]   [phone]            May 09, 2018 10:00 AM CDT   Ortho Treatment with Kiko Julián, PT   [facility] ([facility])    [address] [facility] [address]   [phone]            May 14, 2018 10:30 AM CDT   Ortho Treatment with Kiko Julián, PT   [facility] ([facility])    [address] [facility] [address]   [phone]            May 16, 2018 10:45 AM CDT   Ortho Treatment with Kiko [place], PT   [facility] ([facility])    [address] [facility] [address]   [phone]              Who to contact     If you have questions or need follow up information about today's clinic visit or your schedule please contact [facility] PR[facility] directly at [phone].  Normal or non-critical lab and imaging results will be communicated to you by Telepathyhart, letter or phone within 4 business days after the clinic has received the results. If you do not hear from us within 7 days, please contact the clinic through [employer] or phone. If you have a critical or abnormal lab result, we will notify you by phone as soon as possible.  Submit refill requests through [employer] or call your pharmacy and they will forward the refill request to us. Please allow 3 business days for your refill to be completed.          Additional Information About Your Visit        TelepathyharObjectWay Information     [employer] gives you secure access to your electronic health record. If you see a primary care provider, you can also send messages to your care team and make appointments. If you have questions, please call your primary care clinic.  If you do not have a primary care provider, please call [phone] and they will assist you.        Care EveryWhere ID     This is your  Care EveryWhere ID. This could be used by other organizations to access your [place] medical records  [id number]        Your Vitals Were     Last Period                   01/06/2015            Blood Pressure from Last 3 Encounters:   04/17/18 98/64   03/13/18 112/72   02/12/18 110/62    Weight from Last 3 Encounters:   04/17/18 234 lb (106.1 kg)   03/13/18 236 lb (107 kg)   02/12/18 228 lb 8 oz (103.6 kg)              Today, you had the following     No orders found for display         Today's Medication Changes          These changes are accurate as of 4/17/18 11:59 PM.  If you have any questions, ask your nurse or doctor.               Start taking these medicines.        Dose/Directions    doxycycline 100 MG capsule   Commonly known as:  VIBRAMYCIN   Used for:  Folliculitis   Started by:  Rowena Haas APRN CNP        Dose:  100 mg   Take 1 capsule (100 mg) by mouth 2 times daily   Quantity:  14 capsule   Refills:  0         These medicines have changed or have updated prescriptions.        Dose/Directions    metoprolol succinate 50 MG 24 hr tablet   Commonly known as:  TOPROL-XL   This may have changed:  See the new instructions.   Used for:  Coronary artery disease involving native heart with angina pectoris, unspecified vessel or lesion type (H)   Changed by:  Rowena Haas APRN CNP        Dose:  50 mg   Take 1 tablet (50 mg) by mouth daily   Quantity:  60 tablet   Refills:  3       polyethylene glycol powder   Commonly known as:  MIRALAX/GLYCOLAX   This may have changed:  See the new instructions.   Used for:  Constipation, unspecified constipation type   Changed by:  Rowena Haas APRN CNP        TAKE 8.5 GRAMS (1/2 CAPFUL) BY MOUTH DAILY   Quantity:  527 g   Refills:  3            Where to get your medicines      These medications were sent to [address] [address]     Phone:  [phone]      doxycycline 100 MG capsule    metoprolol succinate 50 MG 24 hr tablet    polyethylene glycol powder                Primary Care Provider Office Phone # Fax #    ART Arias [phone]       [address] [facility] [address]  [facility] [address]        Goals        General    Medical (pt-stated)     Notes - Note created  7/7/2016  9:15 AM by Chelsea Pulido RN    Get blood sugars under control    Improve medication compliance    As of today's date 7/7/2016 goal is met at 0 - 25%.   Goal Status:  Active          Equal Access to Services     [facility]: Hadii aad ku hadasho Soomaali, waaxda luqadaha, qaybta kaalmada adeegyada, lorena ellison . So [facility] [phone].    ATENCIÓN: Si habla español, tiene a trinh disposición servicios gratuitos de asistencia lingüística. Llame al [phone].    We comply with applicable federal civil rights laws and [place] laws. We do not discriminate on the basis of race, color, national origin, age, disability, sex, sexual orientation, or gender identity.            Thank you!     Thank you for choosing [facility] PRIMARY [facility]ARE  for your care. Our goal is always to provide you with excellent care. Hearing back from our patients is one way we can continue to improve our services. Please take a few minutes to complete the written survey that you may receive in the mail after your visit with us. Thank you!             Your Updated Medication List - Protect others around you: Learn how to safely use, store and throw away your medicines at www.disposemymeds.org.          This list is accurate as of 4/17/18 11:59 PM.  Always use your most recent med list.                   Brand Name Dispense Instructions for use Diagnosis    * ACETAMINOPHEN PO      Take 1,000 mg by mouth every 6 hours as needed for pain or fever        * acetaminophen 500 MG tablet    TYLENOL    100 tablet    Take 2 tablets (1,000 mg) by mouth 3 times  daily as needed for mild pain    Chronic low back pain, unspecified back pain laterality, with sciatica presence unspecified       albuterol 108 (90 Base) MCG/ACT Inhaler    PROAIR HFA/PROVENTIL HFA/VENTOLIN HFA    3 Inhaler    Inhale 2 puffs into the lungs every 6 hours as needed for shortness of breath / dyspnea or wheezing    Dyspnea on exertion       aspirin 81 MG EC tablet     28 tablet    TAKE 1 TABLET (81MG) BY MOUTH DAILY    Coronary artery disease involving native heart with angina pectoris, unspecified vessel or lesion type (H)       atorvastatin 80 MG tablet    LIPITOR    28 tablet    TAKE 1 TABLET (80MG) BY MOUTH DAILY    Hyperlipidemia LDL goal <100       blood glucose monitoring lancets     150 each    Use to test blood sugar 2 times daily or as directed.  Ok to substitute alternative if insurance prefers.    Type 2 diabetes mellitus with diabetic neuropathy, with long-term current use of insulin (H)       blood glucose monitoring test strip    ONETOUCH VERIO IQ    200 strip    Use to test blood sugar 4 times daily .  Ok to substitute alternative if insurance prefers.    Type 2 diabetes mellitus with diabetic neuropathy, with long-term current use of insulin (H)       calcium carbonate 1500 (600 Ca) MG tablet    OS-ALLEN 600 mg [place]. Ca    180 tablet    Take 1 tablet (1,500 mg) by mouth daily    Other osteoporosis without current pathological fracture       clotrimazole 1 % cream    LOTRIMIN     Apply topically 2 times daily        DIPHENDRYL PO      Take 25 mg by mouth every 6 hours as needed for itching        doxycycline 100 MG capsule    VIBRAMYCIN    14 capsule    Take 1 capsule (100 mg) by mouth 2 times daily    Folliculitis       gabapentin 300 MG capsule    NEURONTIN    168 capsule    TAKE 2 CAPSULES (600MG) BY MOUTH THREE TIMES A DAY    Type 2 diabetes mellitus with diabetic neuropathy, with long-term current use of insulin (H)       glucose 4 g Chew chewable tablet     20 tablet    Take 2 every  15 minutes for blood sugar <70mg/dL. Recheck blood sugar every 15 minutes until above 70mg/dL, then eat a substantial meal.    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, with long-term current use of insulin, unspecified laterality (H)       insulin pen needle 30G X 8 MM    NOVOFINE 30    400 each    USE 4 DAILY OR AS DIRECTED    Type 2 diabetes mellitus with mild nonproliferative retinopathy without macular edema, with long-term current use of insulin, unspecified laterality (H)       losartan 100 MG tablet    COZAAR    28 tablet    TAKE 1 TABLET (100MG) BY MOUTH DAILY    Coronary artery disease involving native heart with angina pectoris, unspecified vessel or lesion type (H)       melatonin 5 MG Caps     180 capsule    Take 2 capsules by mouth daily At dinnertime    Insomnia, unspecified type       metoprolol succinate 50 MG 24 hr tablet    TOPROL-XL    60 tablet    Take 1 tablet (50 mg) by mouth daily    Coronary artery disease involving native heart with angina pectoris, unspecified vessel or lesion type (H)       * order for DME     1 each    Hold Novolog if meals are  refused.    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    Generalized muscle weakness       paliperidone 9 MG 24 hr tablet    INVEGA    30 tablet    Take 1 tablet (9 mg) by mouth every morning    Schizoaffective disorder, bipolar type (H)       polyethylene glycol powder    MIRALAX/GLYCOLAX    527 g    TAKE 8.5 GRAMS (1/2 CAPFUL) BY MOUTH DAILY    Constipation, unspecified constipation type       prochlorperazine 5 MG tablet    COMPAZINE    90 tablet    Take 1 tablet (5 mg) by mouth every 6 hours as needed for nausea or vomiting    Nausea       ranitidine 300 MG tablet    ZANTAC    90 tablet    TAKE 1 TABLET (300MG) BY MOUTH AT BEDTIME    Gastroesophageal reflux disease without esophagitis       senna-docusate 8.6-50 MG per tablet    SENOKOT-S;PERICOLACE     Take 1 tablet by mouth 2 times daily as needed for constipation        sertraline 100 MG tablet    ZOLOFT    60 tablet    Take 50mg by mouth once daily for 2 weeks then increase to 100mg by mouth once daily    Schizoaffective disorder, bipolar type (H)       SUMAtriptan 25 MG tablet    IMITREX    12 tablet    Take 1-2 tablets (25-50 mg) by mouth at onset of headache for migraine May repeat in 2 hours. Max 8 tablets/24 hours.    Migraine with aura and without status migrainosus, not intractable       trihexyphenidyl 2 MG tablet    ARTANE    90 tablet    Take 1 tablet (2 mg) by mouth 3 times daily    Schizoaffective disorder, bipolar type (H), Extrapyramidal and movement disorder       TRULICITY 1.5 MG/0.5ML pen   Generic drug:  dulaglutide     2 mL     INJECT 1.5MG SUBCUTANEOUSLY EVERY SEVEN DAYS    Type 2 diabetes mellitus with mild nonproliferative retinopathy without macular edema, with long-term current use of insulin, unspecified laterality (H)       vitamin D3 55426 UNITS capsule    CHOLECALCIFEROL    12 capsule    TAKE 1 CAPSULE (50,000 UNITS) MONTHLY    Vitamin D deficiency       * Notice:  This list has 4 medication(s) that are the same as other medications prescribed for you. Read the directions carefully, and ask your doctor or other care provider to review them with you.

## 2018-04-17 NOTE — PROGRESS NOTES
SUBJECTIVE:                                                    Nena Tang is a 58 year old   female who presents to clinic today for the following health issues:  [facility]: Richardson Lopez    Patient is accompanied by her caregiver     Diabetes Follow-up  Patient states that she has been staying away from the sweets. States that she started working with physical therapy. Blood sugars ranging ; Fasting   Patient is checking blood sugars: 4 times daily.    Blood sugar testing frequency justification: Uncontrolled diabetes  Results are as follows         lunchtime - 155         suppertime - 150         bedtime - 245    Diabetic concerns: blood sugar frequently over 200     Symptoms of hypoglycemia (low blood sugar): shaky, dizzy, confusion     Paresthesias (numbness or burning in feet) or sores: None     Date of last diabetic eye exam: due for eye exam, appointment scheduled for 4/23/18    BP Readings from Last 2 Encounters:   03/13/18 112/72   02/12/18 110/62     Hemoglobin A1C (%)   Date Value   02/12/2018 6.8 (H)   12/09/2017 8.9 (H)     LDL Cholesterol Calculated (mg/dL)   Date Value   06/27/2017 64   07/14/2015 78     LDL Cholesterol Direct (mg/dL)   Date Value   07/13/2016 137 (H)     Cervical Cancer Screening: Patient states that she had ?uterine cancer and possible increased bleeding that caused her to have a partial hysterectomy- patient states that she is unsure why this surgery was done. States that it was done in [place] over 20 years. Writer is unclear about the surgical history; plan to do a screening at the next visit- patient is ok with this.     Mental Health Follow up Anxiety, Depression  Patient still having suicidal thoughts, states that she will eventually hurt herself. Unsure about when she will attempt suicide. Patient states that she is feeling more smiley and feeling better overall. Patient states that she goes to the drop in center daily.     Status since last visit:  "good    See PHQ-9 for current symptoms.  Other associated symptoms: None    Complicating factors: none  Significant life event:  No   Current substance abuse:  None  Anxiety or Panic symptoms:  No    Sleep - takes about an hour to fall asleep. Nocturia x 2-3, sleeps with lights on because of the \"man\" she sees in the dark.   Appetite - good, trying to drink more water and eat less sweets.   Exercise - started working with PT    Smoking - no  Alcohol - no  Street drugs - no  Marijuana - no    PHQ-9  English PHQ-9   Any Language               Problems taking medications regularly: No    Medication side effects: dizziness    Diet: regular (no restrictions)    Social History   Substance Use Topics     Smoking status: Never Smoker     Smokeless tobacco: Never Used     Alcohol use No      Comment: last month        Problem list and histories reviewed & adjusted, as indicated.  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 (H)     Illiterate     Irritable bowel syndrome     overweight - BMI >35     Takotsubo cardiomyopathy     CAD (coronary artery disease)     Restless legs syndrome (RLS)     CINDY (obstructive sleep apnea)- mild AHI 10.3     Verbal auditory hallucination     Chronic low back pain     Schizoaffective disorder, depressive type (H)     Migraine headache     HTN, goal below 140/90     Health Care Home     Lumbago     Cervicalgia     Cocaine abuse, episodic     Suicidal ideation     Esophageal reflux     Mild nonproliferative diabetic retinopathy (H)     Tardive dyskinesia     Alcohol use     Left cataract     Falls frequently     History of uterine cancer     Psychophysiological insomnia     Dysuria     Asymptomatic postmenopausal status     Abdominal pain, right lower quadrant     Sepsis (H)     " Pneumonia of right lower lobe due to infectious organism (H)     Infectious encephalopathy     Non-intractable vomiting with nausea     Acute respiratory failure with hypoxia (H)     Thoughts of self harm     Gastroenteritis     Posttraumatic stress disorder     Past Surgical History:   Procedure Laterality Date     C OOPHORECTORMY FOR MALALEJO, W/BX  1983    UTERINE     CATARACT IOL, RT/LT Bilateral 2017     COLONOSCOPY N/A 3/16/2017    Procedure: COLONOSCOPY;  Surgeon: Traci Gonzalez MD;  Location:  GI     Coronary CTA  5/21/2014     HYSTERECTOMY  1983    uterine cancer yearly pap's per provider.     LAPAROSCOPIC CHOLECYSTECTOMY  2008     PHACOEMULSIFICATION CLEAR CORNEA WITH STANDARD INTRAOCULAR LENS IMPLANT Left 5/5/2017    Procedure: PHACOEMULSIFICATION CLEAR CORNEA WITH STANDARD INTRAOCULAR LENS IMPLANT;  LEFT EYE PHACOEMULSIFICATION CLEAR CORNEA WITH STANDARD INTRAOCULAR LENS IMPLANT ;  Surgeon: Tyra Diaz MD;  Location:  [facility]     PHACOEMULSIFICATION CLEAR CORNEA WITH STANDARD INTRAOCULAR LENS IMPLANT Right 6/30/2017    Procedure: PHACOEMULSIFICATION CLEAR CORNEA WITH STANDARD INTRAOCULAR LENS IMPLANT;  RIGHT EYE PHACOEMULSIFICATION CLEAR CORNEA WITH STANDARD INTRAOCULAR LENS IMPLANT;  Surgeon: Tyra Diaz MD;  Location:  [facility]     RELEASE TRIGGER FINGER  10/11/2012    Left thumb. Procedure: RELEASE TRIGGER FINGER;  LEFT THUMB TRIGGER RELEASE;  Surgeon: Tay Langley MD;  Location:  [facility]     RELEASE TRIGGER FINGER Right 12/26/2016    Procedure: RELEASE TRIGGER FINGER;  Surgeon: Albino Castañeda MD;  Location:  OR       Social History   Substance Use Topics     Smoking status: Never Smoker     Smokeless tobacco: Never Used     Alcohol use No      Comment: last month     Family History   Problem Relation Age of Onset     CANCER Mother      BLADDER     Respiratory Mother      COPD     GASTROINTESTINAL DISEASE Mother      CIRRHOSIS OF LI BOLIVAR     Alcohol/Drug Mother      DIABETES  Mother      Hypertension Mother      Lipids Mother      C.A.D. Mother      Glaucoma Mother      Alcohol/Drug Sister      MENTAL ILLNESS Sister      Alcohol/Drug Sister      Psychotic Disorder Sister      CANCER Maternal Grandmother      UNKNOWN TYPE     CANCER Brother      COLON     Cancer - colorectal Brother      IN HIS LATE 30S     Alcohol/Drug Brother       OF HEROIN OVERDOSE AT AGE 22 YRS     Macular Degeneration No family hx of            Current Outpatient Prescriptions   Medication Sig Dispense Refill     TRULICITY 1.5 MG/0.5ML pen INJECT 1.5MG SUBCUTANEOUSLY EVERY SEVEN DAYS 2 mL 0     order for DME Equipment being ordered: 4 Wheeled walker with seat and brakes. 1 each 0     glucose 4 G CHEW chewable tablet Take 2 every 15 minutes for blood sugar <70mg/dL. Recheck blood sugar every 15 minutes until above 70mg/dL, then eat a substantial meal. 20 tablet 1     trihexyphenidyl (ARTANE) 2 MG tablet Take 1 tablet (2 mg) by mouth 3 times daily 90 tablet 0     sertraline (ZOLOFT) 100 MG tablet Take 50mg by mouth once daily for 2 weeks then increase to 100mg by mouth once daily 60 tablet 0     paliperidone (INVEGA) 9 MG 24 hr tablet Take 1 tablet (9 mg) by mouth every morning 30 tablet 0     aspirin 81 MG EC tablet TAKE 1 TABLET (81MG) BY MOUTH DAILY 28 tablet 3     insulin pen needle (NOVOFINE 30) 30G X 8 MM USE 4 DAILY OR AS DIRECTED 400 each 0     insulin glargine (LANTUS) 100 UNIT/ML injection Inject 48 Units Subcutaneous At Bedtime 3 mL 11     vitamin D3 (CHOLECALCIFEROL) 51249 UNITS capsule TAKE 1 CAPSULE (50,000 UNITS) MONTHLY 12 capsule 1     calcium carbonate (OS-ALLEN 600 MG [place]. CA) 1500 (600 CA) MG tablet Take 1 tablet (1,500 mg) by mouth daily 180 tablet 3     losartan (COZAAR) 100 MG tablet TAKE 1 TABLET (100MG) BY MOUTH DAILY 28 tablet 3     gabapentin (NEURONTIN) 300 MG capsule TAKE 2 CAPSULES (600MG) BY MOUTH THREE TIMES A  capsule 3     SUMAtriptan (IMITREX) 25 MG tablet Take 1-2 tablets  (25-50 mg) by mouth at onset of headache for migraine May repeat in 2 hours. Max 8 tablets/24 hours. 12 tablet 1     acetaminophen (TYLENOL) 500 MG tablet Take 2 tablets (1,000 mg) by mouth 3 times daily as needed for mild pain 100 tablet 0     melatonin 5 MG CAPS Take 2 capsules by mouth daily At dinnertime 180 capsule 3     blood glucose monitoring (ONETOUCH VERIO IQ) test strip Use to test blood sugar 4 times daily .  Ok to substitute alternative if insurance prefers. 200 strip 11     DiphenhydrAMINE HCl (DIPHENDRYL PO) Take 25 mg by mouth every 6 hours as needed for itching       clotrimazole (LOTRIMIN) 1 % cream Apply topically 2 times daily       insulin aspart (NOVOLOG FLEXPEN) 100 UNIT/ML injection Inject 20 Units Subcutaneous 3 times daily (with meals) Once daily, can add additional 5 units if BGs are >500mg/dL. 15 mL 11     prochlorperazine (COMPAZINE) 5 MG tablet Take 1 tablet (5 mg) by mouth every 6 hours as needed for nausea or vomiting 90 tablet 0     ranitidine (ZANTAC) 300 MG tablet TAKE 1 TABLET (300MG) BY MOUTH AT BEDTIME 90 tablet 1     order for DME Diabetic Shoes 2 each 0     blood glucose monitoring (ONE TOUCH DELICA) lancets Use to test blood sugar 2 times daily or as directed.  Ok to substitute alternative if insurance prefers. 150 each 11     albuterol (PROAIR HFA/PROVENTIL HFA/VENTOLIN HFA) 108 (90 BASE) MCG/ACT Inhaler Inhale 2 puffs into the lungs every 6 hours as needed for shortness of breath / dyspnea or wheezing 3 Inhaler 1     senna-docusate (SENOKOT-S;PERICOLACE) 8.6-50 MG per tablet Take 1 tablet by mouth 2 times daily as needed for constipation       ACETAMINOPHEN PO Take 1,000 mg by mouth every 6 hours as needed for pain or fever       atorvastatin (LIPITOR) 80 MG tablet TAKE 1 TABLET (80MG) BY MOUTH DAILY 28 tablet 11     metoprolol (TOPROL-XL) 100 MG 24 hr tablet TAKE 1 TABLET (100MG) BY MOUTH DAILY 28 tablet 11     polyethylene glycol (MIRALAX/GLYCOLAX) powder TAKE 17 GRAMS (1  CAPFUL) BY MOUTH DAILY 527 g 3     ibuprofen (ADVIL,MOTRIN) 600 MG tablet Take 1 tablet (600 mg) by mouth every 4 hours as needed for moderate pain 60 tablet 0     order for DME Hold Novolog if meals are refused. 1 each 0     Allergies   Allergen Reactions     Imidazole Antifungals Hives     Tolerates diflucan     Ketoprofen Itching     Pruritis to topical     Lisinopril Hives     Metformin Other (See Comments)     Patient hospitalized for lactic acidosis - admitting provider suspectd caused by metformin     Metronidazole Hives     Posaconazole Hives     Tolerates diflucan     Recent Labs   Lab Test  02/12/18   1408  02/08/18   2155  01/23/18   1526   01/13/18   2315  12/09/17   0748   11/07/17   0801   06/27/17   1358   12/29/16   0909   07/13/16   1350   07/14/15   1215   09/03/13   1156   A1C  6.8*   --    --    --    --   8.9*   --   8.9*   < >  7.0*   < >   --    < >   --    < >  9.1*   < >  10.8*   LDL   --    --    --    --    --    --    --    --    --   64   --    --    --   137*   --   78   < >  90   HDL   --    --    --    --    --    --    --    --    --   37*   --    --    --    --    --   39*   --   37*   TRIG   --    --    --    --    --    --    --    --    --   267*   --    --    --    --    --   151*   --   171*   ALT   --   23   --    --   29   --    --   23   < >  32   < >  26   < >   --    < >   --    < >  38   CR   --   1.09*  0.91   < >  1.62*   --    < >  0.88   < >  0.78   < >  0.72   < >   --    < >  0.67   < >  0.54   GFRESTIMATED   --   52*  64   < >  33*   --    < >  66   < >  76   < >  83   < >   --    < >  >90  Non  GFR Calc     < >  >90   GFRESTBLACK   --   63  77   < >  40*   --    < >  80   < >  >90   GFR Calc     < >  >90   GFR Calc     < >   --    < >  >90   GFR Calc     < >  >90   POTASSIUM   --   4.3  4.1   --   4.0   --    < >  3.9   < >  4.3   < >  4.2   < >   --    < >  4.3   < >  4.4   TSH   --    --    --    --     --    --    --   3.21   --    --    --   2.24   < >   --    < >   --    < >  1.42    < > = values in this interval not displayed.      BP Readings from Last 3 Encounters:   03/13/18 112/72   02/12/18 110/62   02/09/18 161/84    Wt Readings from Last 3 Encounters:   03/13/18 236 lb (107 kg)   02/12/18 228 lb 8 oz (103.6 kg)   01/23/18 226 lb 8 oz (102.7 kg)        Labs reviewed in EPIC  Problem list, Medication list, Allergies, and Medical/Social/Surgical histories reviewed in [facility] and updated as appropriate.     ROS: Constitutional, neuro, ENT, endocrine, pulmonary, cardiac, gastrointestinal, genitourinary, musculoskeletal, integument and psychiatric systems are negative, except as otherwise noted above in the HPI.   OBJECTIVE:                                                    BP 98/64 (BP Location: Right arm)  Pulse 82  Temp 97.4  F (36.3  C) (Oral)  Wt 234 lb (106.1 kg)  LMP 01/06/2015  SpO2 96%  BMI 45.55 kg/m2  Body mass index is 45.55 kg/(m^2).  GENERAL: obese, alert,  no distress, abnormal tongue and jaw movement.   EYES: Eyes grossly normal to inspection, extraocular movements - intact, and PERRL  RESP: lungs clear to auscultation - no rales, no rhonchi, no wheezes  CV: regular rates and rhythm, normal S1 S2, no S3 or S4 and no murmur, no click or rub.  ABDOMEN: soft, no tenderness, normal bowel sounds  NEURO: strength and tone- normal, sensory exam- grossly normal, mentation- intact, speech- normal, reflexes- symmetric  Non focal no aphasia. No facial asymmetry.   Mental Status Assessment:  Appearance:   Appropriate   Eye Contact:   Good   Psychomotor Behavior: Normal  abnormal tongue and jaw movement   Attitude:   Cooperative   Orientation:   All  Speech   Rate / Production: Normal    Volume:  Normal   Mood:    Normal  Affect:    Appropriate   Thought Content:  Clear   Thought Form:  Coherent  Logical   Insight:    Fair   Attention Span and Concentration:  appropriate  Recent and Remote Memory:   intact  Fund of Knowledge: appropriate  Muscle Strength and Tone: normal   Suicidal Ideation: reports thoughts, no intention  Hallucination: Yes    See [facility] notes     Diagnostic Test Results:  none      ASSESSMENT/PLAN:                                                    (I25.119) Coronary artery disease involving native heart with angina pectoris, unspecified vessel or lesion type (H)  (primary encounter diagnosis)  Comment: Patient reporting some dizziness that comes and goes. Denies any chest pain, heart palpitations or shortness of breath.   Plan: metoprolol succinate (TOPROL-XL) 50 MG 24 hr         tablet        -Decreased Metoprolol to help with the dizziness.   -Follow-up with psychiatry about new medication (Artane) side effects.     (F25.1) Schizoaffective disorder, depressive type (H)  Comment: Patient reports some improvement to her overall mood since she was started on anti-depressant.   Caregiver also reports an improvement since her blood sugars have improved.    Plan: -Continue with anti-depressants as prescribed.   -Encouraged patient to follow up with individual therapy.     (K59.00) Constipation, unspecified constipation type  Comment: Stable.   Plan: polyethylene glycol (MIRALAX/GLYCOLAX) powder        Refill done.     (L73.9) Folliculitis  Comment: Patient with a cluster of about 4 pustules and a small open area on the upper right buttock fold. Patient reporting itching and some pain. States that she has been using powder to keep the area dry.     Plan: doxycycline (VIBRAMYCIN) 100 MG capsule        -Discussed keeping the area dry and open to air without the use of powder.  -Encouraged patient to use a clean warm wash cloth to clean the area, and let it air dry.     Patient Instructions   -Get sugar substitutes for coffee at the drop-in center.   -Please check blood sugars three times per day: morning, lunch time and at bedtime.   -Pelvic Exam at the next visit.  -Metoprolol decreased to 50 mg  daily.   -Doxycycline 100 mg twice per day x 7 days.   -Miralax reduce to 1/2 capful.     I spent Greater than 40 minutes was spent face to face with Nena Tang, with greater than 50 % in counselling and consultation about depression, skin concern and medication side effects.     [facility] PRIMARY [facility]ARE

## 2018-04-17 NOTE — MR AVS SNAPSHOT
After Visit Summary   4/17/2018    Nena Tang    MRN: [id number]           Patient Information     Date Of Birth          1960        Visit Information        Provider Department      4/17/2018 2:00 PM Rowena Haas APRN CNP [facility]        Today's Diagnoses     Screening for malignant neoplasm of cervix    -  1    Coronary artery disease involving native heart with angina pectoris, unspecified vessel or lesion type (H)        Constipation, unspecified constipation type        Folliculitis          Care Instructions    -Get sugar substitutes for coffee at the drop-in center.   -Please check blood sugars three times per day: morning, lunch time and at bedtime.   -Pelvic Exam at the next visit.  -Metoprolol decreased to 50 mg daily.   -Doxycycline 100 mg twice per day x 7 days.   -Miralax reduce to 1/2 capful.           Follow-ups after your visit        Your next 10 appointments already scheduled     Apr 20, 2018 10:00 AM CDT   Return Visit with Kraig Pedersen MD   [facility] ([facility])    [address]  [facility] [address]   [phone]            Apr 25, 2018  8:45 AM CDT   RETURN RETINA with Tiburcio Chacon MD   [facility] ([facility])    [address]  [address] [address]  [facility] [address]   [phone]            Apr 25, 2018 10:30 AM CDT   Return Visit with BIN Mondragon   [facility] ([facility])    [address]  [address]  [facility] [address]   [phone]            Apr 30, 2018 10:30 AM CDT   Ortho Treatment with Kiko Gallego PT   [facility] ([facility])    [address] [facility] [address] [phone]            May 02, 2018 10:00 AM CDT   Ortho Treatment with Kiko Gallego PT    [facility] ([facility])    [address] [facility] [address]   [phone]            May 07, 2018 10:30 AM CDT   Ortho Treatment with Kiko [place], PT   [facility] ([facility])    [address] [facility] [address]   [phone]            May 09, 2018 10:00 AM CDT   Ortho Treatment with Kiko Julián, PT   [facility] ([facility])    [address] [facility] [address]   [phone]            May 14, 2018 10:30 AM CDT   Ortho Treatment with Kiko Julián, PT   [facility] ([facility])    [address] [facility] [address]   [phone]            May 16, 2018 10:45 AM CDT   Ortho Treatment with Kiko Julián, PT   [facility] ([facility])    [address] [facility] [address]   [phone]            May 23, 2018 10:45 AM CDT   Ortho Treatment with Agatha Pierce, PT   [facility] ([facility])    [address] [facility] [address]   [phone]              Who to contact     If you have questions or need follow up information about today's clinic visit or your schedule please contact [facility] directly at [phone].  Normal or non-critical lab and imaging results will be communicated to you by MyChart, letter or phone within 4 business days after the clinic has received the results. If you do not hear from us within 7 days, please contact the clinic through MyChart or phone. If you have a critical or abnormal lab result, we will notify you by phone as soon as possible.  Submit refill requests through [employer] or call your pharmacy and they will forward the refill request to us. Please allow 3 business days for your refill to be completed.          Additional Information  About Your Visit        Givit Information     [employer] gives you secure access to your electronic health record. If you see a primary care provider, you can also send messages to your care team and make appointments. If you have questions, please call your primary care clinic.  If you do not have a primary care provider, please call [phone] and they will assist you.        Care EveryWhere ID     This is your Care EveryWhere ID. This could be used by other organizations to access your [place] medical records  [id number]        Your Vitals Were     Pulse Temperature Last Period Pulse Oximetry BMI (Body Mass Index)       82 97.4  F (36.3  C) (Oral) 01/06/2015 96% 45.55 kg/m2        Blood Pressure from Last 3 Encounters:   04/17/18 98/64   03/13/18 112/72   02/12/18 110/62    Weight from Last 3 Encounters:   04/17/18 234 lb (106.1 kg)   03/13/18 236 lb (107 kg)   02/12/18 228 lb 8 oz (103.6 kg)              Today, you had the following     No orders found for display         Today's Medication Changes          These changes are accurate as of 4/17/18  3:02 PM.  If you have any questions, ask your nurse or doctor.               Start taking these medicines.        Dose/Directions    doxycycline 100 MG capsule   Commonly known as:  VIBRAMYCIN   Used for:  Folliculitis   Started by:  Rowena Haas APRN CNP        Dose:  100 mg   Take 1 capsule (100 mg) by mouth 2 times daily   Quantity:  14 capsule   Refills:  0         These medicines have changed or have updated prescriptions.        Dose/Directions    metoprolol succinate 50 MG 24 hr tablet   Commonly known as:  TOPROL-XL   This may have changed:  See the new instructions.   Used for:  Coronary artery disease involving native heart with angina pectoris, unspecified vessel or lesion type (H)   Changed by:  Rowena Haas APRN CNP        Dose:  50 mg   Take 1 tablet (50 mg) by mouth daily   Quantity:  60 tablet   Refills:  3       polyethylene glycol  powder   Commonly known as:  MIRALAX/GLYCOLAX   This may have changed:  See the new instructions.   Used for:  Constipation, unspecified constipation type   Changed by:  Rowena Haas APRN CNP        TAKE 8.5 GRAMS (1/2 CAPFUL) BY MOUTH DAILY   Quantity:  527 g   Refills:  3            Where to get your medicines      These medications were sent to [address] [address]     Phone:  [phone]     doxycycline 100 MG capsule    metoprolol succinate 50 MG 24 hr tablet    polyethylene glycol powder                Primary Care Provider Office Phone # Fax #    ART Arias [phone]       [address] [facility] [address]  [facility] [address]        Goals        General    Medical (pt-stated)     Notes - Note created  7/7/2016  9:15 AM by Chelsea Pulido RN    Get blood sugars under control    Improve medication compliance    As of today's date 7/7/2016 goal is met at 0 - 25%.   Goal Status:  Active          Equal Access to Services     [facility]: Hadii samira ku hadasho Soomaali, waaxda luqadaha, qaybta kaalmada hossein, lorena ellison . So [facility] [phone].    ATENCIÓN: Si habla español, tiene a trinh disposición servicios gratGuadalupe County Hospitalos de asistencia lingüística. Smith al [phone].    We comply with applicable federal civil rights laws and [place] laws. We do not discriminate on the basis of race, color, national origin, age, disability, sex, sexual orientation, or gender identity.            Thank you!     Thank you for choosing [facility]  for your care. Our goal is always to provide you with excellent care. Hearing back from our patients is one way we can continue to improve our services. Please take a few minutes to complete the written survey that you may receive in the mail after your visit with us. Thank you!             Your  Updated Medication List - Protect others around you: Learn how to safely use, store and throw away your medicines at www.disposemymeds.org.          This list is accurate as of 4/17/18  3:02 PM.  Always use your most recent med list.                   Brand Name Dispense Instructions for use Diagnosis    * ACETAMINOPHEN PO      Take 1,000 mg by mouth every 6 hours as needed for pain or fever        * acetaminophen 500 MG tablet    TYLENOL    100 tablet    Take 2 tablets (1,000 mg) by mouth 3 times daily as needed for mild pain    Chronic low back pain, unspecified back pain laterality, with sciatica presence unspecified       albuterol 108 (90 Base) MCG/ACT Inhaler    PROAIR HFA/PROVENTIL HFA/VENTOLIN HFA    3 Inhaler    Inhale 2 puffs into the lungs every 6 hours as needed for shortness of breath / dyspnea or wheezing    Dyspnea on exertion       aspirin 81 MG EC tablet     28 tablet    TAKE 1 TABLET (81MG) BY MOUTH DAILY    Coronary artery disease involving native heart with angina pectoris, unspecified vessel or lesion type (H)       atorvastatin 80 MG tablet    LIPITOR    28 tablet    TAKE 1 TABLET (80MG) BY MOUTH DAILY    Hyperlipidemia LDL goal <100       blood glucose monitoring lancets     150 each    Use to test blood sugar 2 times daily or as directed.  Ok to substitute alternative if insurance prefers.    Type 2 diabetes mellitus with diabetic neuropathy, with long-term current use of insulin (H)       blood glucose monitoring test strip    ONETOUCH VERIO IQ    200 strip    Use to test blood sugar 4 times daily .  Ok to substitute alternative if insurance prefers.    Type 2 diabetes mellitus with diabetic neuropathy, with long-term current use of insulin (H)       calcium carbonate 1500 (600 Ca) MG tablet    OS-ALLEN 600 mg Ruby. Ca    180 tablet    Take 1 tablet (1,500 mg) by mouth daily    Other osteoporosis without current pathological fracture       clotrimazole 1 % cream    LOTRIMIN     Apply topically  2 times daily        DIPHENDRYL PO      Take 25 mg by mouth every 6 hours as needed for itching        doxycycline 100 MG capsule    VIBRAMYCIN    14 capsule    Take 1 capsule (100 mg) by mouth 2 times daily    Folliculitis       gabapentin 300 MG capsule    NEURONTIN    168 capsule    TAKE 2 CAPSULES (600MG) BY MOUTH THREE TIMES A DAY    Type 2 diabetes mellitus with diabetic neuropathy, with long-term current use of insulin (H)       glucose 4 g Chew chewable tablet     20 tablet    Take 2 every 15 minutes for blood sugar <70mg/dL. Recheck blood sugar every 15 minutes until above 70mg/dL, then eat a substantial meal.    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, with long-term current use of insulin, unspecified laterality (H)       insulin pen needle 30G X 8 MM    NOVOFINE 30    400 each    USE 4 DAILY OR AS DIRECTED    Type 2 diabetes mellitus with mild nonproliferative retinopathy without macular edema, with long-term current use of insulin, unspecified laterality (H)       losartan 100 MG tablet    COZAAR    28 tablet    TAKE 1 TABLET (100MG) BY MOUTH DAILY    Coronary  artery disease involving native heart with angina pectoris, unspecified vessel or lesion type (H)       melatonin 5 MG Caps     180 capsule    Take 2 capsules by mouth daily At dinnertime    Insomnia, unspecified type       metoprolol succinate 50 MG 24 hr tablet    TOPROL-XL    60 tablet    Take 1 tablet (50 mg) by mouth daily    Coronary artery disease involving native heart with angina pectoris, unspecified vessel or lesion type (H)       * order for DME     1 each    Hold Novolog if meals are refused.    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    Generalized muscle weakness       paliperidone 9 MG 24 hr tablet    INVEGA    30 tablet    Take 1 tablet (9 mg) by mouth every morning    Schizoaffective disorder, bipolar type (H)       polyethylene glycol powder    MIRALAX/GLYCOLAX    527 g    TAKE 8.5 GRAMS (1/2 CAPFUL) BY MOUTH DAILY    Constipation, unspecified constipation type       prochlorperazine 5 MG tablet    COMPAZINE    90 tablet    Take 1 tablet (5 mg) by mouth every 6 hours as needed for nausea or vomiting    Nausea       ranitidine 300 MG tablet    ZANTAC    90 tablet    TAKE 1 TABLET (300MG) BY MOUTH AT BEDTIME    Gastroesophageal reflux disease without esophagitis       senna-docusate 8.6-50 MG per tablet    SENOKOT-S;PERICOLACE     Take 1 tablet by mouth 2 times daily as needed for constipation        sertraline 100 MG tablet    ZOLOFT    60 tablet    Take 50mg by mouth once daily for 2 weeks then increase to 100mg by mouth once daily    Schizoaffective disorder, bipolar type (H)        SUMAtriptan 25 MG tablet    IMITREX    12 tablet    Take 1-2 tablets (25-50 mg) by mouth at onset of headache for migraine May repeat in 2 hours. Max 8 tablets/24 hours.    Migraine with aura and without status migrainosus, not intractable       trihexyphenidyl 2 MG tablet    ARTANE    90 tablet    Take 1 tablet (2 mg) by mouth 3 times daily    Schizoaffective disorder, bipolar type (H), Extrapyramidal and movement disorder       TRULICITY 1.5 MG/0.5ML pen   Generic drug:  dulaglutide     2 mL    INJECT 1.5MG SUBCUTANEOUSLY EVERY SEVEN DAYS    Type 2 diabetes mellitus with mild nonproliferative retinopathy without macular edema, with long-term current use of insulin, unspecified laterality (H)       vitamin D3 85891 UNITS capsule    CHOLECALCIFEROL    12 capsule    TAKE 1 CAPSULE (50,000 UNITS) MONTHLY    Vitamin D deficiency       * Notice:  This list has 4 medication(s) that are the same as other medications prescribed for you. Read the directions carefully, and ask your doctor or other care provider to review them with you.

## 2018-04-17 NOTE — PATIENT INSTRUCTIONS
-Get sugar substitutes for coffee at the drop-in center.   -Please check blood sugars three times per day: morning, lunch time and at bedtime.   -Pelvic Exam at the next visit.  -Metoprolol decreased to 50 mg daily.   -Doxycycline 100 mg twice per day x 7 days.   -Miralax reduce to 1/2 capful.

## 2018-04-19 NOTE — PROGRESS NOTES
"  [facility] Progress Note                                                                   Nena Tang is a 58 year old female who was referred by her primary care provider for treatment and evaluation of depression and psychosis  Therapist: N/A  PCP: Rowena Haas  Other Providers: N/A     History was provided by patient and care taker Zheng Barnes (tel # [phone]) who was a limited historian.    Pertinent Background:  See section specific documentation.  Psych critical item history includes suicidal ideation, psychosis [sxs include hallucinations], mutiple psychotropic trials, psych hosp (>5) and SUBSTANCE USE: cocaine and alcohol     Interim History                                                                                                        4, 4     She reported feeling better. She was accompanied by her direct care provider Zheng who also verified that her affect has gone from completely flat to a brighter affects.    She said, \"The depression meds are working.\" She agreed to increase her Zoloft.    She reported occasionally still hearing voices that tell her to kill herself but stated that they are less frequent and that she is able to ignore them. She reported that she has not heard voices for a week now.    Her sister is doing better.     She is still exhibiting leg tremors and jaw thrusting along with involuntary tongue movements. She and Zheng both said that this symptom has been there since she used to be on Haldol and that her tremors have overall improved. She is agreeable to changing from Trihexiphenydil to Cogentin to see if it will help with symptoms.    Recent Symptoms:   Depression:  depressed mood (improving)  Psychosis:  auditory hallucinations without commands [details in Interim History]  ADVERSE EFFECTS: legs tremors and jaw thrusting     Recent Substance Use:  none reported        Social/ Family History                                  [per " patient report]                                 1ea,1ea     She is currently on SSDI and lives in an Adult Foster [facility] Facility. She is  for the last 3-4 years and was  about 12 years. She has two adult sons. She reported no history of abuse in her life  Medical / Surgical History                                                                                                                  Patient Active Problem List   Diagnosis     Osteopenia     Vitamin B12 deficiency without anemia     Hyperlipidemia LDL goal <100     Rotator cuff syndrome     Type 2 diabetes mellitus with mild nonproliferative retinopathy (H)     Illiterate     Irritable bowel syndrome     overweight - BMI >35     Takotsubo cardiomyopathy     CAD (coronary artery disease)     Restless legs syndrome (RLS)     CINDY (obstructive sleep apnea)- mild AHI 10.3     Verbal auditory hallucination     Chronic low back pain     Schizoaffective disorder, depressive type (H)     Migraine headache     HTN, goal below 140/90     Health Care Home     Lumbago     Cervicalgia     Cocaine abuse, episodic     Suicidal ideation     Esophageal reflux     Mild nonproliferative diabetic retinopathy (H)     Tardive dyskinesia     Alcohol use     Left cataract     Falls frequently     History of uterine cancer     Psychophysiological insomnia     Dysuria     Asymptomatic postmenopausal status     Abdominal pain, right lower quadrant     Sepsis (H)     Pneumonia of right lower lobe due to infectious organism (H)     Infectious encephalopathy     Non-intractable vomiting with nausea     Acute respiratory failure with hypoxia (H)     Thoughts of self harm     Gastroenteritis     Posttraumatic stress disorder       Past Surgical History:   Procedure Laterality Date     C OOPHORECTORMY FOR RAHEL, W/BX  1983    UTERINE     CATARACT IOL, RT/LT Bilateral 2017     COLONOSCOPY N/A 3/16/2017    Procedure: COLONOSCOPY;  Surgeon: Traci Gonzalez MD;   [facility]: [facility]     Coronary CTA  5/21/2014     HYSTERECTOMY  1983    uterine cancer yearly pap's per provider.     LAPAROSCOPIC CHOLECYSTECTOMY  2008     PHACOEMULSIFICATION CLEAR CORNEA WITH STANDARD INTRAOCULAR LENS IMPLANT Left 5/5/2017    Procedure: PHACOEMULSIFICATION CLEAR CORNEA WITH STANDARD INTRAOCULAR LENS IMPLANT;  LEFT EYE PHACOEMULSIFICATION CLEAR CORNEA WITH STANDARD INTRAOCULAR LENS IMPLANT ;  Surgeon: Tyra Diaz MD;  Location:  [facility]     PHACOEMULSIFICATION CLEAR CORNEA WITH STANDARD INTRAOCULAR LENS IMPLANT Right 6/30/2017    Procedure: PHACOEMULSIFICATION CLEAR CORNEA WITH STANDARD INTRAOCULAR LENS IMPLANT;  RIGHT EYE PHACOEMULSIFICATION CLEAR CORNEA WITH STANDARD INTRAOCULAR LENS IMPLANT;  Surgeon: Tyra Diaz MD;  Location:  [facility]     RELEASE TRIGGER FINGER  10/11/2012    Left thumb. Procedure: RELEASE TRIGGER FINGER;  LEFT THUMB TRIGGER RELEASE;  Surgeon: Tay Langley MD;  Location:  [facility]     RELEASE TRIGGER FINGER Right 12/26/2016    Procedure: RELEASE TRIGGER FINGER;  Surgeon: Albino Castañeda MD;  Location:  OR        Medical Review of Systems                                                                                                    2,10   A comprehensive review of systems was performed and is negative other than noted in the HPI.  Allergy                                Imidazole antifungals; Ketoprofen; Lisinopril; Metformin; Metronidazole; and Posaconazole  Current Medications                                                                                                       Current Outpatient Prescriptions   Medication Sig Dispense Refill     acetaminophen (TYLENOL) 500 MG tablet Take 2 tablets (1,000 mg) by mouth 3 times daily as needed for mild pain 100 tablet 0     ACETAMINOPHEN PO Take 1,000 mg by mouth every 6 hours as needed for pain or fever       albuterol (PROAIR HFA/PROVENTIL HFA/VENTOLIN HFA) 108 (90 BASE) MCG/ACT Inhaler Inhale 2 puffs into the  lungs every 6 hours as needed for shortness of breath / dyspnea or wheezing 3 Inhaler 1     aspirin 81 MG EC tablet TAKE 1 TABLET (81MG) BY MOUTH DAILY 28 tablet 3     atorvastatin (LIPITOR) 80 MG tablet TAKE 1 TABLET (80MG) BY MOUTH DAILY 28 tablet 11     blood glucose monitoring (ONE TOUCH DELICA) lancets Use to test blood sugar 2 times daily or as directed.  Ok to substitute alternative if insurance prefers. 150 each 11     blood glucose monitoring (ONETOUCH VERIO IQ) test strip Use to test blood sugar 4 times daily .  Ok to substitute alternative if insurance prefers. 200 strip 11     calcium carbonate (OS-ALLEN 600 MG [place]. CA) 1500 (600 CA) MG tablet Take 1 tablet (1,500 mg) by mouth daily 180 tablet 3     clotrimazole (LOTRIMIN) 1 % cream Apply topically 2 times daily       DiphenhydrAMINE HCl (DIPHENDRYL PO) Take 25 mg by mouth every 6 hours as needed for itching       doxycycline (VIBRAMYCIN) 100 MG capsule Take 1 capsule (100 mg) by mouth 2 times daily 14 capsule 0     gabapentin (NEURONTIN) 300 MG capsule TAKE 2 CAPSULES (600MG) BY MOUTH THREE TIMES A  capsule 3     glucose 4 G CHEW chewable tablet Take 2 every 15 minutes for blood sugar <70mg/dL. Recheck blood sugar every 15 minutes until above 70mg/dL, then eat a substantial meal. 20 tablet 1     ibuprofen (ADVIL,MOTRIN) 600 MG tablet Take 1 tablet (600 mg) by mouth every 4 hours as needed for moderate pain 60 tablet 0     insulin aspart (NOVOLOG FLEXPEN) 100 UNIT/ML injection Inject 20 Units Subcutaneous 3 times daily (with meals) Once daily, can add additional 5 units if BGs are >500mg/dL. 15 mL 11     insulin glargine (LANTUS) 100 UNIT/ML injection Inject 48 Units Subcutaneous At Bedtime 3 mL 11     insulin pen needle (NOVOFINE 30) 30G X 8 MM USE 4 DAILY OR AS DIRECTED 400 each 0     losartan (COZAAR) 100 MG tablet TAKE 1 TABLET (100MG) BY MOUTH DAILY 28 tablet 3     melatonin 5 MG CAPS Take 2 capsules by mouth daily At dinnertime 180 capsule 3      metoprolol succinate (TOPROL-XL) 50 MG 24 hr tablet Take 1 tablet (50 mg) by mouth daily 60 tablet 3     order for DME Hold Novolog if meals are refused. 1 each 0     order for DME Diabetic Shoes 2 each 0     order for DME Equipment being ordered: 4 Wheeled walker with seat and brakes. 1 each 0     paliperidone (INVEGA) 9 MG 24 hr tablet Take 1 tablet (9 mg) by mouth every morning 30 tablet 0     polyethylene glycol (MIRALAX/GLYCOLAX) powder TAKE 8.5 GRAMS (1/2 CAPFUL) BY MOUTH DAILY 527 g 3     prochlorperazine (COMPAZINE) 5 MG tablet Take 1 tablet (5 mg) by mouth every 6 hours as needed for nausea or vomiting 90 tablet 0     ranitidine (ZANTAC) 300 MG tablet TAKE 1 TABLET (300MG) BY MOUTH AT BEDTIME 90 tablet 1     senna-docusate (SENOKOT-S;PERICOLACE) 8.6-50 MG per tablet Take 1 tablet by mouth 2 times daily as needed for constipation       sertraline (ZOLOFT) 100 MG tablet Take 50mg by mouth once daily for 2 weeks then increase to 100mg by mouth once daily 60 tablet 0     SUMAtriptan (IMITREX) 25 MG tablet Take 1-2 tablets (25-50 mg) by mouth at onset of headache for migraine May repeat in 2 hours. Max 8 tablets/24 hours. 12 tablet 1     trihexyphenidyl (ARTANE) 2 MG tablet Take 1 tablet (2 mg) by mouth 3 times daily 90 tablet 0     TRULICITY 1.5 MG/0.5ML pen INJECT 1.5MG SUBCUTANEOUSLY EVERY SEVEN DAYS 2 mL 0     vitamin D3 (CHOLECALCIFEROL) 48296 UNITS capsule TAKE 1 CAPSULE (50,000 UNITS) MONTHLY 12 capsule 1     Vitals                                                                                                                       3, 3   LMP 01/06/2015   Mental Status Exam                                                                                    9, 14 cog gs     Appearance: casually groomed  Behavior/Demeanor: cooperative, with good  eye contact   Speech: normal  Language: intact  Psychomotor: abnormal movements of the tongue and lower jaw  Mood: depressed  Affect: full range; was congruent  to mood; was congruent to content  Thought Process/Associations: unremarkable  Thought Content:  Reports none;  Denies suicidal ideation and violent ideation  Perception:  Reports none;  Denies auditory hallucinations and visual hallucinations  Insight: limited  Judgment: limited  Cognition: (6) does  appear grossly intact; formal cognitive testing was not done  grossly oriented to her circumstances  Gait and Station: unremarkable     Suicide Risk Assessment:  Today Nena Tang reports no suicidal ideation and no homicidal ideation. In addition, she has notable risk factors for self-harm, including single status and psychosis. However, risk is mitigated by commitment to family and history of seeking help when needed. Therefore, based on all available evidence including the factors cited above, she does not appear to be at imminent risk for self-harm, does not meet criteria for a 72-hr hold, and therefore remains appropriate for ongoing outpatient level of care.     Labs and Data                                                                                                                 PHQ9   PHQ-9 SCORE 1/2/2017 2/3/2017 3/13/2018   Total Score - - -   Total Score - - -   Total Score 0 0 14     Diagnosis and Assessment                                                                             m2, h3     Today the following issues were addressed:    1) Psychosis  2) Extrapyramidal side effects    [place] Prescription Monitoring Program [] review was not needed today.    PSYCHOTROPIC DRUG INTERACTIONS: none clinically relevant     Diagnosis:  Schizoaffective Disorder    Plan                                                                                                                    m2, h3      - Stop Trihexyphenidyl  - Start Cogentin 0.5mg by mouth twice daily.  - Increase Sertraline to 150mg by mouth once daily.  - Continue Paliperidone 9mg by mouth once daily.    RTC: 4 weeks    CRISIS NUMBERS:    Provided routinely in AVS.    Treatment Risk Statement:  The patient understands the risks, benefits, adverse effects and alternatives. Agrees to treatment with the capacity to do so. No medical contraindications to treatment. Agrees to call clinic for any problems. The patient understands to call 911 or go to the nearest ED if life threatening or urgent symptoms occur.      Psychiatry Clinic Individual Psychotherapy Note                                                                     [16]     Start time - 10:05am       End time - 10:25am  Subjective: This supportive psychotherapy session addressed issues related to low mood, worry thoughts about sister's health.  Patient's reaction: Contemplation in the context of mental status appropriate for ambulatory setting.  Progress: fair to good  Plan: RTC 4 weeks  Psychotherapy services during this visit included myself, direct care staff from patient's residential facility ([place]) and the patient.     PROVIDER:  Kraig Pedersen MD

## 2018-04-20 ENCOUNTER — OFFICE VISIT (OUTPATIENT)
Dept: BEHAVIORAL HEALTH | Facility: CLINIC | Age: 58
End: 2018-04-20
Payer: MEDICARE

## 2018-04-20 ENCOUNTER — OFFICE VISIT (OUTPATIENT)
Dept: PSYCHIATRY | Facility: CLINIC | Age: 58
End: 2018-04-20
Payer: MEDICARE

## 2018-04-20 DIAGNOSIS — F25.1 SCHIZOAFFECTIVE DISORDER, DEPRESSIVE TYPE (H): Primary | ICD-10-CM

## 2018-04-20 DIAGNOSIS — F25.0 SCHIZOAFFECTIVE DISORDER, BIPOLAR TYPE (H): ICD-10-CM

## 2018-04-20 DIAGNOSIS — G25.9 EXTRAPYRAMIDAL AND MOVEMENT DISORDER: ICD-10-CM

## 2018-04-20 DIAGNOSIS — F43.10 POSTTRAUMATIC STRESS DISORDER: ICD-10-CM

## 2018-04-20 PROCEDURE — 90832 PSYTX W PT 30 MINUTES: CPT | Performed by: SOCIAL WORKER

## 2018-04-20 PROCEDURE — 90833 PSYTX W PT W E/M 30 MIN: CPT | Performed by: PSYCHIATRY & NEUROLOGY

## 2018-04-20 PROCEDURE — 99214 OFFICE O/P EST MOD 30 MIN: CPT | Performed by: PSYCHIATRY & NEUROLOGY

## 2018-04-20 RX ORDER — PALIPERIDONE 9 MG/1
9 TABLET, EXTENDED RELEASE ORAL EVERY MORNING
Qty: 30 TABLET | Refills: 0 | Status: SHIPPED | OUTPATIENT
Start: 2018-04-20 | End: 2018-06-06

## 2018-04-20 RX ORDER — BENZTROPINE MESYLATE 0.5 MG/1
0.5 TABLET ORAL 2 TIMES DAILY
Qty: 60 TABLET | Refills: 1 | Status: SHIPPED | OUTPATIENT
Start: 2018-04-20 | End: 2018-06-07

## 2018-04-20 RX ORDER — SERTRALINE HYDROCHLORIDE 100 MG/1
150 TABLET, FILM COATED ORAL DAILY
Qty: 60 TABLET | Refills: 1 | Status: SHIPPED | OUTPATIENT
Start: 2018-04-20 | End: 2018-06-08

## 2018-04-20 NOTE — MR AVS SNAPSHOT
MRN:[id number]                      After Visit Summary   4/20/2018    Nena Tang    MRN: [id number]           Visit Information        Provider Department      4/20/2018 10:00 AM Kraig Pedersen MD [facility]        Your next 10 appointments already scheduled     Apr 25, 2018  8:45 AM CDT   RETURN RETINA with Tiburcio Chacon MD   [facility] ([facility])    [address]  [address]  [facility] [address]   [phone]            Apr 25, 2018 10:30 AM CDT   Return Visit with Richardson Lopez, [facility] ([facility])    [address]  [address]  [facility] [address]   [phone]            Apr 30, 2018 10:30 AM CDT   Ortho Treatment with Kiko Julián, PT   [facility] ([facility])    [address] [address]   [phone]            May 02, 2018 10:00 AM CDT   Ortho Treatment with Kiko Julián, PT   [facility] ([facility])    [address] [address]   [phone]            May 07, 2018 10:30 AM CDT   Ortho Treatment with Kiko [place], PT   [facility] ([facility])    [address] [facility] [address]   [phone]            May 09, 2018 10:00 AM CDT   Ortho Treatment with Kiko [place], PT   [facility] ([facility])    [address] [facility] [address]   [phone]            May 14, 2018 10:30 AM CDT   Ortho Treatment with Kiko Julián, PT   [facility] ([facility])    [address] [facility] [address]   [phone]            May 16, 2018 10:45 AM CDT   Ortho Treatment with Kiko [place], PT   [facility] ([facility])    [address]  [facility] [address]   [phone]            May 22, 2018  1:30 PM CDT   Return Visit with ART Arias CNP   [facility] ([facility])    [address]  [address]  [facility] [id number] [phone]            May 22, 2018  1:30 PM CDT   Return Visit with BIN Mondragon   [facility] ([facility])    [address]  [address]  [facility] [id number] [phone]              Care Instructions    - Stop Trihexyphenidyl  - Start Cogentin 0.5mg by mouth twice daily.  - Increase Sertraline to 150mg by mouth once daily.  - Continue Paliperidone 9mg by mouth once daily.  Current Outpatient Prescriptions   Medication Sig     benztropine (COGENTIN) 0.5 MG tablet Take 1 tablet (0.5 mg) by mouth 2 times daily     paliperidone (INVEGA) 9 MG 24 hr tablet Take 1 tablet (9 mg) by mouth every morning     sertraline (ZOLOFT) 100 MG tablet Take 1.5 tablets (150 mg) by mouth daily     acetaminophen (TYLENOL) 500 MG tablet Take 2 tablets (1,000 mg) by mouth 3 times daily as needed for mild pain     ACETAMINOPHEN PO Take 1,000 mg by mouth every 6 hours as needed for pain or fever     albuterol (PROAIR HFA/PROVENTIL HFA/VENTOLIN HFA) 108 (90 BASE) MCG/ACT Inhaler Inhale 2 puffs into the lungs every 6 hours as needed for shortness of breath / dyspnea or wheezing     aspirin 81 MG EC tablet TAKE 1 TABLET (81MG) BY MOUTH DAILY     atorvastatin (LIPITOR) 80 MG tablet TAKE 1 TABLET (80MG) BY MOUTH DAILY     blood glucose monitoring (ONE TOUCH DELICA) lancets Use to test blood sugar 2 times daily or as directed.  Ok to substitute alternative if insurance prefers.     blood glucose monitoring (ONETOUCH VERIO IQ) test strip Use to test blood sugar 4 times daily .  Ok to substitute alternative if insurance prefers.     calcium carbonate (OS-ALLEN 600  MG [place]. CA) 1500 (600 CA) MG tablet Take 1 tablet (1,500 mg) by mouth daily     clotrimazole (LOTRIMIN) 1 % cream Apply topically 2 times daily     DiphenhydrAMINE HCl (DIPHENDRYL PO) Take 25 mg by mouth every 6 hours as needed for itching     doxycycline (VIBRAMYCIN) 100 MG capsule Take 1 capsule (100 mg) by mouth 2 times daily     gabapentin (NEURONTIN) 300 MG capsule TAKE 2 CAPSULES (600MG) BY MOUTH THREE TIMES A DAY     glucose 4 G CHEW chewable tablet Take 2 every 15 minutes for blood sugar <70mg/dL. Recheck blood sugar every 15 minutes until above 70mg/dL, then eat a substantial meal.     ibuprofen (ADVIL,MOTRIN) 600 MG tablet Take 1 tablet (600 mg) by mouth every 4 hours as needed for moderate pain     insulin aspart (NOVOLOG FLEXPEN) 100 UNIT/ML injection Inject 20 Units Subcutaneous 3 times daily (with meals) Once daily, can add additional 5 units if BGs are >500mg/dL.     insulin glargine (LANTUS) 100 UNIT/ML injection Inject 48 Units Subcutaneous At Bedtime     insulin pen needle (NOVOFINE 30) 30G X 8 MM USE 4 DAILY OR AS DIRECTED     losartan (COZAAR) 100 MG tablet TAKE 1 TABLET (100MG) BY MOUTH DAILY     melatonin 5 MG CAPS Take 2 capsules by mouth daily At dinnertime     metoprolol succinate (TOPROL-XL) 50 MG 24 hr tablet Take 1 tablet (50 mg) by mouth daily     order for DME Hold Novolog if meals are refused.     order for DME Diabetic Shoes     order for DME Equipment being ordered: 4 Wheeled walker with seat and brakes.     polyethylene glycol (MIRALAX/GLYCOLAX) powder TAKE 8.5 GRAMS (1/2 CAPFUL) BY MOUTH DAILY     prochlorperazine (COMPAZINE) 5 MG tablet Take 1 tablet (5 mg) by mouth every 6 hours as needed for nausea or vomiting     ranitidine (ZANTAC) 300 MG tablet TAKE 1 TABLET (300MG) BY MOUTH AT BEDTIME     senna-docusate (SENOKOT-S;PERICOLACE) 8.6-50 MG per tablet Take 1 tablet by mouth 2 times daily as needed for constipation     SUMAtriptan (IMITREX) 25 MG tablet Take 1-2 tablets (25-50  mg) by mouth at onset of headache for migraine May repeat in 2 hours. Max 8 tablets/24 hours.     TRULICITY 1.5 MG/0.5ML pen INJECT 1.5MG SUBCUTANEOUSLY EVERY SEVEN DAYS     vitamin D3 (CHOLECALCIFEROL) 89034 UNITS capsule TAKE 1 CAPSULE (50,000 UNITS) MONTHLY     [DISCONTINUED] paliperidone (INVEGA) 9 MG 24 hr tablet Take 1 tablet (9 mg) by mouth every morning     [DISCONTINUED] sertraline (ZOLOFT) 100 MG tablet Take 50mg by mouth once daily for 2 weeks then increase to 100mg by mouth once daily     No current facility-administered medications for this visit.      24/7 Crisis Hotlines:    National Suicide Prevention Hotline   913-896-CJXZ (6250)    Crisis Hotlines by County:    [facility] Crisis Line     [phone]  [place] Crisis Line   [phone]  [facility] Crisis Line     [phone]  [facility] COPE for Adults   [phone]  [place] for Children    [phone]  [place] for Adults    [phone]  [facility] for Children    [phone]  [facility] Crisis Line    [phone]    [facility] Crisis Response Team  [phone] ([phone])  [facility] Crisis is available 24 hours a day. The team provides callers with a needs assessment and referrals to appropriate resources. If medically necessary, the Crisis Response Team assists individuals by traveling to their location to provide face-to-face interventions and assessments. Crisis services are available for adults and children in [facility] and [facility]. Adult Crisis beds are also available if appropriate.    Walk-In Centers and Mobile Teams  [facility] Walk-In Crisis Intervention  [phone]  [facility] COPE (18+) Crisis Mobile Team    [phone]  [facility] Child (under 17) Crisis Mobile Team [phone]  [place] [facility] Walk-In & Mobile Teams [phone]    Additional Crisis Hotlines:  [facility] for [facility]      [phone]  Crisis Connection       [phone]  [facility] ([facility] Crisis Services)  [phone]  [facility] Social Service (S)    [phone]  Men s Crisis Line      697-775-VABY ([phone])  National Suicide Prevention Hotline   185-075-VFAN (2092)  [facility] Crisis Line    [phone]  Suicide Hotline      613.214.5849  [employer] (formerly First Call for Help)  Dial 2-1-10 (328-236-7157)    Domestic Violence, Rape and Sexual Abuse Hotlines:  [place] Crisis Line     [phone]  [place] Helpline  [phone]  Domestic Abuse Project (DAP)    1-988.678.8302*  [place] Crisis Line     [phone]  [facility] for Battered Women  4-975-427-9552*  [facility]       [phone] ([phone]*)  National Domestic Violence Hotline   2-296-065-OMWD  Rape/Sexual Abuse Center Crisis Line    [phone]   Sexual Violence Center (SVC)    [phone]  [employer]     [phone] ([phone]*)           MyChart Information     [employer] gives you secure access to your electronic health record. If you see a primary care provider, you can also send messages to your care team and make appointments. If you have questions, please call your primary care clinic.  If you do not have a primary care provider, please call [phone] and they will assist you.        Care EveryWhere ID     This is your Care EveryWhere ID. This could be used by other organizations to access your [facility] medical records  [id number]        Equal Access to Services     RAMESH GARRIDO AH: Azul Rios, luis napier, lorena galvin [facility] [phone].    ATENCIÓN: Si habla español, tiene a trinh disposición servicios gratuitos de asistencia lingüística. Smith al [phone].    We comply with applicable federal civil rights laws and [place] laws. We do not discriminate on the basis of race, color, national origin, age,  disability, sex, sexual orientation, or gender identity.

## 2018-04-20 NOTE — PROGRESS NOTES
[facility]   April 20, 2018      Behavioral Health Clinician Progress Note    Patient Name: Nena Tang           Service Type:  Individual      Service Location:   Face to Face in Clinic     Session Start Time: 9:40am  Session End Time: 10:06am      Session Length: 16 - 37      Attendees: Patient    Visit Activities (Refresh list every visit): [facility]    Diagnostic Assessment Date: 1-23-18  Treatment Plan Review Date: Not completed yet  See Flowsheets for today's PHQ-9 and TAMIA-7 results  Previous PHQ-9:   PHQ-9 SCORE 1/2/2017 2/3/2017 3/13/2018   Total Score - - -   Total Score - - -   Total Score 0 0 14     Previous TAMIA-7:   TAMIA-7 SCORE 1/2/2017 2/3/2017 3/13/2018   Total Score - - -   Total Score 0 0 17   Total Score - - -       ALEXANDRA LEVEL:  ALEXANDRA Score (Last Two) 8/30/2011 6/9/2014   ALEXANDRA Raw Score 42 37   Activation Score 66 49.9   ALEXANDRA Level 3 2       DATA  Extended Session (60+ minutes): No  Interactive Complexity: No  Crisis: No  [facility] Patient: No    Treatment Objective(s) Addressed in This Session:  Target Behavior(s): disease management/lifestyle changes mental health    Depressed Mood: Increase interest, engagement, and pleasure in doing things  Decrease frequency and intensity of feeling down, depressed, hopeless  Improve quantity and quality of night time sleep / decrease daytime naps  Feel less tired and more energy during the day   Identify negative self-talk and behaviors: challenge core beliefs, myths, and actions  Improve concentration, focus, and mindfulness in daily activities   Decrease thoughts that you'd be better off dead or of suicide / self-harm  Anxiety: will experience a reduction in anxiety, will develop more effective coping skills to manage anxiety symptoms, will develop healthy cognitive patterns and beliefs and will increase ability to function adaptively  Alcohol / Substance Use: continue to make healthy choices regarding substance use and engage  "in activities / supportive services that promote sobriety  Thought Disturbance: will develop skills to more effectively manage symptoms, will develop more effective support systems and will continue to take medications as prescribed    Current Stressors / Issues:    The patient reported that it is hard to fall asleep-wakes during the night-having bad dreams-she continues to have auditory and visual hallucination and she is able to ignore him successfully-and sometime she engages \"the man\" he never says anything good-it is hard to ignore \"the man\" when she ignores the man she feels \"good\" when she listens to him she feels sad and angry-WHY WOULD YOU LISTEN TO THE MAN-he is in my room I can't ignore him-the effort of avoiding the man leads me to feeling good-she has difficulty sleeping due to the man-WHAT COULD YOU TELL YOUR SELF TO REMIND YOURSELF THAT THINGS WILL GET BETTER-Don't worry about it just remember the good times-no suicidal thoughts            Progress on Treatment Objective(s) / Homework:  Minimal progress - ACTION (Actively working towards change); Intervened by reinforcing change plan / affirming steps taken    Motivational Interviewing    MI Intervention: Expressed Empathy/Understanding, Supported Autonomy, Collaboration, Evocation, Permission to raise concern or advise, Open-ended questions, Reflections: simple and complex, Rolled with resistance: Emphasized patient autonomy, Simple reflection and Evoked patient agenda and Change talk (evoked)     Change Talk Expressed by the Patient: Desire to change Reasons to change Need to change Activation Taking steps    Provider Response to Change Talk: E - Evoked more info from patient about behavior change, A - Affirmed patient's thoughts, decisions, or attempts at behavior change, R - Reflected patient's change talk and S - Summarized patient's change talk statements      Care Plan review completed: No    Medication Review:  No changes to current " psychiatric medication(s)   Medication Compliance:  NA    Changes in Health Issues:   None reported     Chemical Use Review:   Substance Use: Chemical use reviewed, no active concerns identified      Tobacco Use: No current tobacco use.      Assessment: Current Emotional / Mental Status (status of significant symptoms):  Risk status (Self / Other harm or suicidal ideation)  Patient has had a history of suicidal ideation: yes  Patient denies current fears or concerns for personal safety.  Patient reports the following current or recent suicidal ideation or behaviors: no intent to harm the self at this time.  Patient denies current or recent homicidal ideation or behaviors.  Patient denies current or recent self injurious behavior or ideation.  Patient denies other safety concerns.  A safety and risk management plan has not been developed at this time, however patient was encouraged to call [place] [phone] should there be a change in any of these risk factors.    Appearance:   Appropriate   Eye Contact:   Good   Psychomotor Behavior: Normal   Attitude:   Cooperative   Orientation:   All  Speech   Rate / Production: Normal    Volume:  Normal   Mood:    Anxious  Normal  Affect:    Appropriate  Blunted   Thought Content:  Clear   Thought Form:  Coherent   Insight:    Fair     Diagnoses:  1. Schizoaffective disorder, depressive type (H)    2. Posttraumatic stress disorder        Collateral Reports Completed:  Not Applicable    Plan: (Homework, other):  Patient was given information about behavioral services and encouraged to schedule a follow up appointment with the clinic [facility] as needed.  She was also given information about mental health symptoms and treatment options .  CD Recommendations: Maintain Sobriety.    BIN George, [facility]      ______________________________________________________________________

## 2018-04-20 NOTE — PATIENT INSTRUCTIONS
- Stop Trihexyphenidyl  - Start Cogentin 0.5mg by mouth twice daily.  - Increase Sertraline to 150mg by mouth once daily.  - Continue Paliperidone 9mg by mouth once daily.  Current Outpatient Prescriptions   Medication Sig     benztropine (COGENTIN) 0.5 MG tablet Take 1 tablet (0.5 mg) by mouth 2 times daily     paliperidone (INVEGA) 9 MG 24 hr tablet Take 1 tablet (9 mg) by mouth every morning     sertraline (ZOLOFT) 100 MG tablet Take 1.5 tablets (150 mg) by mouth daily     acetaminophen (TYLENOL) 500 MG tablet Take 2 tablets (1,000 mg) by mouth 3 times daily as needed for mild pain     ACETAMINOPHEN PO Take 1,000 mg by mouth every 6 hours as needed for pain or fever     albuterol (PROAIR HFA/PROVENTIL HFA/VENTOLIN HFA) 108 (90 BASE) MCG/ACT Inhaler Inhale 2 puffs into the lungs every 6 hours as needed for shortness of breath / dyspnea or wheezing     aspirin 81 MG EC tablet TAKE 1 TABLET (81MG) BY MOUTH DAILY     atorvastatin (LIPITOR) 80 MG tablet TAKE 1 TABLET (80MG) BY MOUTH DAILY     blood glucose monitoring (ONE TOUCH DELICA) lancets Use to test blood sugar 2 times daily or as directed.  Ok to substitute alternative if insurance prefers.     blood glucose monitoring (ONETOUCH VERIO IQ) test strip Use to test blood sugar 4 times daily .  Ok to substitute alternative if insurance prefers.     calcium carbonate (OS-ALLEN 600 MG [place]. CA) 1500 (600 CA) MG tablet Take 1 tablet (1,500 mg) by mouth daily     clotrimazole (LOTRIMIN) 1 % cream Apply topically 2 times daily     DiphenhydrAMINE HCl (DIPHENDRYL PO) Take 25 mg by mouth every 6 hours as needed for itching     doxycycline (VIBRAMYCIN) 100 MG capsule Take 1 capsule (100 mg) by mouth 2 times daily     gabapentin (NEURONTIN) 300 MG capsule TAKE 2 CAPSULES (600MG) BY MOUTH THREE TIMES A DAY     glucose 4 G CHEW chewable tablet Take 2 every 15 minutes for blood sugar <70mg/dL. Recheck blood sugar every 15 minutes until above 70mg/dL, then eat a substantial meal.      ibuprofen (ADVIL,MOTRIN) 600 MG tablet Take 1 tablet (600 mg) by mouth every 4 hours as needed for moderate pain     insulin aspart (NOVOLOG FLEXPEN) 100 UNIT/ML injection Inject 20 Units Subcutaneous 3 times daily (with meals) Once daily, can add additional 5 units if BGs are >500mg/dL.     insulin glargine (LANTUS) 100 UNIT/ML injection Inject 48 Units Subcutaneous At Bedtime     insulin pen needle (NOVOFINE 30) 30G X 8 MM USE 4 DAILY OR AS DIRECTED     losartan (COZAAR) 100 MG tablet TAKE 1 TABLET (100MG) BY MOUTH DAILY     melatonin 5 MG CAPS Take 2 capsules by mouth daily At dinnertime     metoprolol succinate (TOPROL-XL) 50 MG 24 hr tablet Take 1 tablet (50 mg) by mouth daily     order for DME Hold Novolog if meals are refused.     order for DME Diabetic Shoes     order for DME Equipment being ordered: 4 Wheeled walker with seat and brakes.     polyethylene glycol (MIRALAX/GLYCOLAX) powder TAKE 8.5 GRAMS (1/2 CAPFUL) BY MOUTH DAILY     prochlorperazine (COMPAZINE) 5 MG tablet Take 1 tablet (5 mg) by mouth every 6 hours as needed for nausea or vomiting     ranitidine (ZANTAC) 300 MG tablet TAKE 1 TABLET (300MG) BY MOUTH AT BEDTIME     senna-docusate (SENOKOT-S;PERICOLACE) 8.6-50 MG per tablet Take 1 tablet by mouth 2 times daily as needed for constipation     SUMAtriptan (IMITREX) 25 MG tablet Take 1-2 tablets (25-50 mg) by mouth at onset of headache for migraine May repeat in 2 hours. Max 8 tablets/24 hours.     TRULICITY 1.5 MG/0.5ML pen INJECT 1.5MG SUBCUTANEOUSLY EVERY SEVEN DAYS     vitamin D3 (CHOLECALCIFEROL) 70619 UNITS capsule TAKE 1 CAPSULE (50,000 UNITS) MONTHLY     [DISCONTINUED] paliperidone (INVEGA) 9 MG 24 hr tablet Take 1 tablet (9 mg) by mouth every morning     [DISCONTINUED] sertraline (ZOLOFT) 100 MG tablet Take 50mg by mouth once daily for 2 weeks then increase to 100mg by mouth once daily     No current facility-administered medications for this visit.      24/7 Crisis  Hotlines:    National Suicide Prevention Hotline   675-525-LYAF (3913)    Crisis Hotlines by County:    [facility] Crisis Line     [phone]  [place] Crisis Line   [phone]  [facility] Crisis Line     [phone]  [place] COPE for Adults   [phone]  [place] for Children    [phone]  [facility] for Adults    [phone]  [place] for Children    [phone]  [facility] Crisis Line    [phone]    [facility] Crisis Response Team  [phone] ([phone])  [facility] Crisis is available 24 hours a day. The team provides callers with a needs assessment and referrals to appropriate resources. If medically necessary, the Crisis Response Team assists individuals by traveling to their location to provide face-to-face interventions and assessments. Crisis services are available for adults and children in [facility] and [facility]. Adult Crisis beds are also available if appropriate.    Walk-In Centers and Mobile Teams  [facility] Walk-In Crisis Intervention  [phone]  [facility] COPE (18+) Crisis Mobile Team    [phone]  [facility] Child (under 17) Crisis Mobile Team [phone]  [facility] for Adults Walk-In & Mobile Teams [phone]    Additional Crisis Hotlines:  [facility]      [phone]  Crisis Connection      [phone]  [facility] ([facility] Crisis Services)  [phone]  [facility] Social Service (S)    [phone]  Men s Crisis Line      [phone] ([phone])  National Suicide Prevention Hotline   201-981-HMNI (8886)  [facility] Crisis Line    [phone]  Suicide Hotline      [phone]  [facility] (formerly [facility])  Dial 2-1-13 ([phone])    Domestic Violence, Rape and Sexual Abuse Hotlines:  [place] Crisis Line     [phone]  [place] Helpline  [phone]  Domestic Abuse Project  (DAP)    7-746-502-3842*  [facility] Crisis Line     [phone]  [place] [facility]  5-444-610-0421*  [place] [facility]       [phone] ([phone]*)  National Domestic Violence Hotline   6-446-032-SAFE  Rape/Sexual Abuse Center Crisis Line    [phone]   Sexual Violence Center (SVC)    [phone]  [facility] [employer]     [phone] ([phone]*)

## 2018-04-20 NOTE — MR AVS SNAPSHOT
After Visit Summary   4/20/2018    Nena Tang    MRN: [id number]           Patient Information     Date Of Birth          1960        Visit Information        Provider Department      4/20/2018 9:30 AM Richardson Lopez [facility]        Today's Diagnoses     Schizoaffective disorder, depressive type (H)    -  1    Posttraumatic stress disorder           Follow-ups after your visit        Your next 10 appointments already scheduled     Apr 25, 2018  8:45 AM CDT   RETURN RETINA with Tiburcio Chacon MD   [facility] ([facility])    [address]  [facility] [address]   [phone]            Apr 25, 2018 10:30 AM CDT   Return Visit with Richardson Lopez [facility] ([facility])    [address] [place], [place] [address] [address]  [address]  [facility] [address]   [phone]            Apr 30, 2018 10:30 AM CDT   Ortho Treatment with Kiko Julián, PT   [facility] ([facility])    [address] [facility] [address]   [phone]            May 02, 2018 10:00 AM CDT   Ortho Treatment with Kiko Julián, PT   [facility] ([facility])    [address] [facility] [address]   [phone]            May 07, 2018 10:30 AM CDT   Ortho Treatment with Kiko Monarch, PT   [facility] ([facility])    [address] [facility] [address]   [phone]            May 09, 2018 10:00 AM CDT   Ortho Treatment with Kiko Monarch, PT   [facility] ([facility])    [address] [facility] [address]   [phone]            May 14, 2018 10:30 AM CDT   Ortho Treatment with Kiko Julián, PT   [facility] ([facility])     [address] [facility] [address] [facility] [facility] [address]   [phone]            May 16, 2018 10:45 AM CDT   Ortho Treatment with Kiko Gallego PT   [facility] ([facility])    [address]  [facility] [address]   [phone]            May 22, 2018  1:30 PM CDT   Return Visit with ART Arias CNP   [facility] ([facility])    [address]  [address]  [facility] [id number] [phone]            May 22, 2018  1:30 PM CDT   Return Visit with BIN Mondragon   [facility] ([facility])    [address]  [address]  [facility] [address] [phone]              Who to contact     If you have questions or need follow up information about today's clinic visit or your schedule please contact [facility] directly at [phone].  Normal or non-critical lab and imaging results will be communicated to you by [employer], letter or phone within 4 business days after the clinic has received the results. If you do not hear from us within 7 days, please contact the clinic through [employer] or phone. If you have a critical or abnormal lab result, we will notify you by phone as soon as possible.  Submit refill requests through [employer] or call your pharmacy and they will forward the refill request to us. Please allow 3 business days for your refill to be completed.          Additional Information About Your Visit        [employer] Information     [employer] gives you secure access to your electronic health record. If you see a primary care provider, you can also send messages to your care team and make appointments. If you have questions, please call your primary care clinic.  If you do not have a primary care provider, please call [phone] and they will assist you.        Care EveryWhere ID     This is your Care  EveryWhere ID. This could be used by other organizations to access your [facility] medical records  [id number]        Your Vitals Were     Last Period                   01/06/2015            Blood Pressure from Last 3 Encounters:   04/17/18 98/64   03/13/18 112/72   02/12/18 110/62    Weight from Last 3 Encounters:   04/17/18 234 lb (106.1 kg)   03/13/18 236 lb (107 kg)   02/12/18 228 lb 8 oz (103.6 kg)              Today, you had the following     No orders found for display         Today's Medication Changes          These changes are accurate as of 4/20/18 11:59 PM.  If you have any questions, ask your nurse or doctor.               Start taking these medicines.        Dose/Directions    benztropine 0.5 MG tablet   Commonly known as:  COGENTIN   Used for:  Extrapyramidal and movement disorder   Started by:  Kraig Pedersen MD        Dose:  0.5 mg   Take 1 tablet (0.5 mg) by mouth 2 times daily   Quantity:  60 tablet   Refills:  1         These medicines have changed or have updated prescriptions.        Dose/Directions    sertraline 100 MG tablet   Commonly known as:  ZOLOFT   This may have changed:    - how much to take  - how to take this  - when to take this  - additional instructions   Used for:  Schizoaffective disorder, bipolar type (H)   Changed by:  Kraig Pedersen MD        Dose:  150 mg   Take 1.5 tablets (150 mg) by mouth daily   Quantity:  60 tablet   Refills:  1         Stop taking these medicines if you haven't already. Please contact your care team if you have questions.     trihexyphenidyl 2 MG tablet   Commonly known as:  ARTANE   Stopped by:  Kraig Pedersen MD                Where to get your medicines      These medications were sent to [address] [address]     Phone:  [phone]     benztropine 0.5 MG tablet    paliperidone 9 MG 24 hr tablet    sertraline 100 MG tablet                Primary Care  Provider Office Phone # Fax #    ART Arias [phone]       [address] [facility] [address]  [facility] [address]        Goals        General    Medical (pt-stated)     Notes - Note created  7/7/2016  9:15 AM by Chelsea Pulido RN    Get blood sugars under control    Improve medication compliance    As of today's date 7/7/2016 goal is met at 0 - 25%.   Goal Status:  Active          Equal Access to Services     [facility] : Hadii aad ku hadasho Soomaali, waaxda luqadaha, qaybta kaalmada adeegyada, waxay idiin hayaan adeeg kharash la'aan . So [facility] [phone].    ATENCIÓN: Si habla español, tiene a trinh disposición servicios gratuitos de asistencia lingüística. Llame al [phone].    We comply with applicable federal civil rights laws and [place] laws. We do not discriminate on the basis of race, color, national origin, age, disability, sex, sexual orientation, or gender identity.            Thank you!     Thank you for choosing [facility] PRIMARY CARE  for your care. Our goal is always to provide you with excellent care. Hearing back from our patients is one way we can continue to improve our services. Please take a few minutes to complete the written survey that you may receive in the mail after your visit with us. Thank you!             Your Updated Medication List - Protect others around you: Learn how to safely use, store and throw away your medicines at www.disposemymeds.org.          This list is accurate as of 4/20/18 11:59 PM.  Always use your most recent med list.                   Brand Name Dispense Instructions for use Diagnosis    * ACETAMINOPHEN PO      Take 1,000 mg by mouth every 6 hours as needed for pain or fever        * acetaminophen 500 MG tablet    TYLENOL    100 tablet    Take 2 tablets (1,000 mg) by mouth 3 times daily as needed for mild pain    Chronic low back pain, unspecified back pain laterality, with sciatica presence unspecified        albuterol 108 (90 Base) MCG/ACT Inhaler    PROAIR HFA/PROVENTIL HFA/VENTOLIN HFA    3 Inhaler    Inhale 2 puffs into the lungs every 6 hours as needed for shortness of breath / dyspnea or wheezing    Dyspnea on exertion       aspirin 81 MG EC tablet     28 tablet    TAKE 1 TABLET (81MG) BY MOUTH DAILY    Coronary artery disease involving native heart with angina pectoris, unspecified vessel or lesion type (H)       atorvastatin 80 MG tablet    LIPITOR    28 tablet    TAKE 1 TABLET (80MG) BY MOUTH DAILY    Hyperlipidemia LDL goal <100       benztropine 0.5 MG tablet    COGENTIN    60 tablet    Take 1 tablet (0.5 mg) by mouth 2 times daily    Extrapyramidal and movement disorder       blood glucose monitoring lancets     150 each    Use to test blood sugar 2 times daily or as directed.  Ok to substitute alternative if insurance prefers.    Type 2 diabetes mellitus with diabetic neuropathy, with long-term current use of insulin (H)       blood glucose monitoring test strip    ONETOUCH VERIO IQ    200 strip    Use to test blood sugar 4 times daily .  Ok to substitute alternative if insurance prefers.    Type 2 diabetes mellitus with diabetic neuropathy, with long-term current use of insulin (H)       calcium carbonate 1500 (600 Ca) MG tablet    OS-ALLEN 600 mg [place]. Ca    180 tablet    Take 1 tablet (1,500 mg) by mouth daily    Other osteoporosis without current pathological fracture       clotrimazole 1 % cream    LOTRIMIN     Apply topically 2 times daily        DIPHENDRYL PO      Take 25 mg by mouth every 6 hours as needed for itching        doxycycline 100 MG capsule    VIBRAMYCIN    14 capsule    Take 1 capsule (100 mg) by mouth 2 times daily    Folliculitis       gabapentin 300 MG capsule    NEURONTIN    168 capsule    TAKE 2 CAPSULES (600MG) BY MOUTH THREE TIMES A DAY    Type 2 diabetes mellitus with diabetic neuropathy, with long-term current use of insulin (H)       glucose 4 g Chew chewable tablet     20 tablet     Take 2 every 15 minutes for blood sugar <70mg/dL. Recheck blood sugar every 15 minutes until above 70mg/dL, then eat a substantial meal.    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, with long-term current use of insulin, unspecified laterality (H)       insulin pen needle 30G X 8 MM    NOVOFINE 30    400 each    USE 4 DAILY OR AS DIRECTED    Type 2 diabetes mellitus with mild nonproliferative retinopathy without macular edema, with long-term current use of insulin, unspecified laterality (H)       losartan 100 MG tablet    COZAAR    28 tablet    TAKE 1 TABLET (100MG) BY MOUTH DAILY    Coronary artery disease involving native heart with angina pectoris, unspecified vessel or lesion type (H)       melatonin 5 MG Caps     180 capsule    Take 2 capsules by mouth daily At dinnertime    Insomnia, unspecified type       metoprolol succinate 50 MG 24 hr tablet    TOPROL-XL    60 tablet    Take 1 tablet (50 mg) by mouth daily    Coronary artery disease involving native heart with angina pectoris, unspecified vessel or lesion type (H)       * order for DME     1 each    Hold Novolog if  meals are refused.    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    Generalized muscle weakness       paliperidone 9 MG 24 hr tablet    INVEGA    30 tablet    Take 1 tablet (9 mg) by mouth every morning    Schizoaffective disorder, bipolar type (H)       polyethylene glycol powder    MIRALAX/GLYCOLAX    527 g    TAKE 8.5 GRAMS (1/2 CAPFUL) BY MOUTH DAILY    Constipation, unspecified constipation type       prochlorperazine 5 MG tablet    COMPAZINE    90 tablet    Take 1 tablet (5 mg) by mouth every 6 hours as needed for nausea or vomiting    Nausea       ranitidine 300 MG tablet    ZANTAC    90 tablet    TAKE 1 TABLET (300MG) BY MOUTH AT BEDTIME    Gastroesophageal reflux disease without esophagitis       senna-docusate 8.6-50 MG per tablet    SENOKOT-S;PERICOLACE     Take 1 tablet by mouth 2 times daily as needed for constipation        sertraline 100 MG tablet    ZOLOFT    60 tablet    Take 1.5 tablets (150 mg) by mouth daily    Schizoaffective disorder, bipolar type (H)       SUMAtriptan 25 MG tablet    IMITREX    12 tablet    Take 1-2 tablets (25-50 mg) by mouth at onset of headache for migraine May repeat in 2 hours. Max 8 tablets/24 hours.    Migraine with aura and without status migrainosus, not intractable       TRULICITY 1.5 MG/0.5ML pen   Generic drug:  dulaglutide     2 mL    INJECT 1.5MG SUBCUTANEOUSLY EVERY SEVEN DAYS    Type 2 diabetes mellitus with mild nonproliferative retinopathy without macular edema, with long-term current use of insulin, unspecified laterality (H)       vitamin D3 98268  UNITS capsule    CHOLECALCIFEROL    12 capsule    TAKE 1 CAPSULE (50,000 UNITS) MONTHLY    Vitamin D deficiency       * Notice:  This list has 4 medication(s) that are the same as other medications prescribed for you. Read the directions carefully, and ask your doctor or other care provider to review them with you.

## 2018-04-25 ENCOUNTER — OFFICE VISIT (OUTPATIENT)
Dept: OPHTHALMOLOGY | Facility: CLINIC | Age: 58
End: 2018-04-25
Attending: OPHTHALMOLOGY
Payer: MEDICARE

## 2018-04-25 DIAGNOSIS — Z79.4 TYPE 2 DIABETES MELLITUS WITH BOTH EYES AFFECTED BY MILD NONPROLIFERATIVE RETINOPATHY WITHOUT MACULAR EDEMA, WITH LONG-TERM CURRENT USE OF INSULIN (H): ICD-10-CM

## 2018-04-25 DIAGNOSIS — E11.3293 TYPE 2 DIABETES MELLITUS WITH BOTH EYES AFFECTED BY MILD NONPROLIFERATIVE RETINOPATHY WITHOUT MACULAR EDEMA, WITH LONG-TERM CURRENT USE OF INSULIN (H): ICD-10-CM

## 2018-04-25 PROCEDURE — 67210 TREATMENT OF RETINAL LESION: CPT | Mod: ZF,LT | Performed by: OPHTHALMOLOGY

## 2018-04-25 PROCEDURE — G0463 HOSPITAL OUTPT CLINIC VISIT: HCPCS | Mod: ZF

## 2018-04-25 PROCEDURE — 92250 FUNDUS PHOTOGRAPHY W/I&R: CPT | Mod: ZF | Performed by: OPHTHALMOLOGY

## 2018-04-25 PROCEDURE — 92134 CPTRZ OPH DX IMG PST SGM RTA: CPT | Mod: ZF | Performed by: OPHTHALMOLOGY

## 2018-04-25 ASSESSMENT — CUP TO DISC RATIO
OS_RATIO: 0.3
OD_RATIO: 0.3

## 2018-04-25 ASSESSMENT — SLIT LAMP EXAM - LIDS
COMMENTS: NORMAL
COMMENTS: NORMAL

## 2018-04-25 ASSESSMENT — TONOMETRY
OS_IOP_MMHG: 23
OD_IOP_MMHG: 21
IOP_METHOD: TONOPEN

## 2018-04-25 ASSESSMENT — CONF VISUAL FIELD
METHOD: COUNTING FINGERS
OS_NORMAL: 1
OD_NORMAL: 1

## 2018-04-25 ASSESSMENT — VISUAL ACUITY
METHOD: SNELLEN - LINEAR
OS_SC+: -2
OD_SC+: -1
OS_SC: 20/40
OD_SC: 20/30

## 2018-04-25 ASSESSMENT — EXTERNAL EXAM - LEFT EYE: OS_EXAM: NORMAL

## 2018-04-25 ASSESSMENT — EXTERNAL EXAM - RIGHT EYE: OD_EXAM: NORMAL

## 2018-04-25 NOTE — MR AVS SNAPSHOT
After Visit Summary   4/25/2018    Nena Tang    MRN: [id number]           Patient Information     Date Of Birth          1960        Visit Information        Provider Department      4/25/2018 8:45 AM [facility]        Today's Diagnoses     Type 2 diabetes mellitus with both eyes affected by mild nonproliferative retinopathy without macular edema, with long-term current use of insulin (H) - Both Eyes           Follow-ups after your visit        Follow-up notes from your care team     Return in about 8 weeks (around 6/20/2018) for DFE, OCT Mac, NPDR.      Your next 10 appointments already scheduled     Apr 30, 2018 10:30 AM CDT   Ortho Treatment with Kiko Beatrice, PT   [facility] ([facility])    [address] [address]   [phone]            May 02, 2018 10:00 AM CDT   Ortho Treatment with Kiko Julián, PT   [facility] ([facility])    [address] [address]   [phone]            May 07, 2018 10:30 AM CDT   Ortho Treatment with Kiko Beatrice, PT   [facility] ([facility])    [address] [address]   [phone]            May 08, 2018 11:00 AM CDT   Return Visit with Richardson Lopez, [facility] ([facility])    [address]   [address]  [facility] [address]   [phone]            May 09, 2018 10:00 AM CDT   Ortho Treatment with Kiko Julián, PT   [facility] ([facility])    [address] [address]   [phone]            May 14, 2018 10:30 AM CDT   Ortho Treatment with Kiko Beatrice, PT   [facility] ([facility])    [address] [address]   [phone]            May 16, 2018  10:45 AM CDT   Ortho Treatment with Kiko Gallego, PT   [facility] ([facility])    [address] [id number] [phone]            May 22, 2018  1:30 PM CDT   Return Visit with ART Arias CNP   [facility]    [address]  [address]  [facility] [id number] [phone]            May 22, 2018  1:30 PM CDT   Return Visit with BIN Mondragon   [facility] ([facility])    [address]  [address]  [facility] [id number] [phone]            May 23, 2018 10:45 AM CDT   Ortho Treatment with Agatha Pierce, PT   [facility] ([facility])    [address] [id number] [phone]              Future tests that were ordered for you today     Open Future Orders        Priority Expected Expires Ordered    OCT Retina Spectralis OU (both eyes) Routine  10/27/2019 4/25/2018            Who to contact     Please call your clinic at [phone] to:    Ask questions about your health    Make or cancel appointments    Discuss your medicines    Learn about your test results    Speak to your doctor            Additional Information About Your Visit        [email or web address] Information     [email or web address] gives you secure access to your electronic health record. If you see a primary care provider, you can also send messages to your care team and make appointments. If you have questions, please call your primary care clinic.  If you do not have a primary care provider, please call [phone] and they will assist you.      [email or web address] is an electronic gateway that provides easy, online access to your medical records. With [email or web address], you can request a clinic appointment, read your test results, renew a prescription or communicate with your care team.     To access  your existing account, please contact your [facility] or call [phone] for assistance.        Care EveryWhere ID     This is your Care EveryWhere ID. This could be used by other organizations to access your [place] medical records  [id number]        Your Vitals Were     Last Period                   01/06/2015            Blood Pressure from Last 3 Encounters:   04/17/18 98/64   03/13/18 112/72   02/12/18 110/62    Weight from Last 3 Encounters:   04/17/18 106.1 kg (234 lb)   03/13/18 107 kg (236 lb)   02/12/18 103.6 kg (228 lb 8 oz)              We Performed the Following     Focal Laser Edema OS (left eye)     Fundus Photos OU (both eyes)     OCT Retina Spectralis OU (both eyes)        Primary Care Provider Office Phone # Fax #    ART Arias [phone]       [address] [facility] [address]  [facility] [address]        Goals        General    Medical (pt-stated)     Notes - Note created  7/7/2016  9:15 AM by Chelsea Pulido, RN    Get blood sugars under control    Improve medication compliance    As of today's date 7/7/2016 goal is met at 0 - 25%.   Goal Status:  Active          Equal Access to Services     RAMESH GARRIDO AH: Hadii samira ku hadasho Soomaali, waaxda luqadaha, qaybta kaalmada adeegyada, lorena ellison . So [facility] [phone].    ATENCIÓN: Si habla español, tiene a trinh disposición servicios gratuitos de asistencia lingüística. Llya al [phone].    We comply with applicable federal civil rights laws and [place] laws. We do not discriminate on the basis of race, color, national origin, age, disability, sex, sexual orientation, or gender identity.            Thank you!     Thank you for choosing [facility]  for your care. Our goal is always to provide you with excellent care. Hearing back from our patients is one way we can continue to improve our services. Please take a few minutes to complete the written survey that  you may receive in the mail after your visit with us. Thank you!             Your Updated Medication List - Protect others around you: Learn how to safely use, store and throw away your medicines at www.disposemymeds.org.          This list is accurate as of 4/25/18 11:18 AM.  Always use your most recent med list.                   Brand Name Dispense Instructions for use Diagnosis    * ACETAMINOPHEN PO      Take 1,000 mg by mouth every 6 hours as needed for pain or fever        * acetaminophen 500 MG tablet    TYLENOL    100 tablet    Take 2 tablets (1,000 mg) by mouth 3 times daily as needed for mild pain    Chronic low back pain, unspecified back pain laterality, with sciatica presence unspecified       albuterol 108 (90 Base) MCG/ACT Inhaler    PROAIR HFA/PROVENTIL HFA/VENTOLIN HFA    3 Inhaler    Inhale 2 puffs into the lungs every 6 hours as needed for shortness of breath / dyspnea or wheezing    Dyspnea on exertion       aspirin 81 MG EC tablet     28 tablet    TAKE 1 TABLET (81MG) BY MOUTH DAILY    Coronary artery disease involving native heart with angina pectoris, unspecified vessel or lesion type (H)       atorvastatin 80 MG tablet    LIPITOR    28 tablet    TAKE 1 TABLET (80MG) BY MOUTH DAILY    Hyperlipidemia LDL goal <100       benztropine 0.5 MG tablet    COGENTIN    60 tablet    Take 1 tablet (0.5 mg) by mouth 2 times daily    Extrapyramidal and movement disorder       blood glucose monitoring lancets     150 each    Use to test blood sugar 2 times daily or as directed.  Ok to substitute alternative if insurance prefers.    Type 2 diabetes mellitus with diabetic neuropathy, with long-term current use of insulin (H)       blood glucose monitoring test strip    ONETOUCH VERIO IQ    200 strip    Use to test blood sugar 4 times daily .  Ok to substitute alternative if insurance prefers.    Type 2 diabetes mellitus with diabetic neuropathy, with long-term current use of insulin (H)       calcium carbonate  1500 (600 Ca) MG tablet    OS-ALLEN 600 mg [place]. Ca    180 tablet    Take 1 tablet (1,500 mg) by mouth daily    Other osteoporosis without current pathological fracture       clotrimazole 1 % cream    LOTRIMIN     Apply topically 2 times daily        DIPHENDRYL PO      Take 25 mg by mouth every 6 hours as needed for itching        doxycycline 100 MG capsule    VIBRAMYCIN    14 capsule    Take 1 capsule (100 mg) by mouth 2 times daily    Folliculitis       gabapentin 300 MG capsule    NEURONTIN    168 capsule    TAKE 2 CAPSULES (600MG) BY MOUTH THREE TIMES A DAY    Type 2 diabetes mellitus with diabetic neuropathy, with long-term current use of insulin (H)       glucose 4 g Chew chewable tablet     20 tablet    Take 2 every 15 minutes for blood sugar <70mg/dL. Recheck blood sugar every 15 minutes until above 70mg/dL, then eat a substantial meal.    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, with long-term current use of insulin, unspecified laterality (H)       insulin pen needle 30G X 8 MM    NOVOFINE 30    400 each    USE 4 DAILY OR AS DIRECTED    Type 2 diabetes mellitus with mild  nonproliferative retinopathy without macular edema, with long-term current use of insulin, unspecified laterality (H)       losartan 100 MG tablet    COZAAR    28 tablet    TAKE 1 TABLET (100MG) BY MOUTH DAILY    Coronary artery disease involving native heart with angina pectoris, unspecified vessel or lesion type (H)       melatonin 5 MG Caps     180 capsule    Take 2 capsules by mouth daily At dinnertime    Insomnia, unspecified type       metoprolol succinate 50 MG 24 hr tablet    TOPROL-XL    60 tablet    Take 1 tablet (50 mg) by mouth daily    Coronary artery disease involving native heart with angina pectoris, unspecified vessel or lesion type (H)       * order for DME     1 each    Hold Novolog if meals are refused.    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    Generalized muscle weakness       paliperidone 9 MG 24 hr tablet    INVEGA    30 tablet    Take 1 tablet (9 mg) by mouth every morning    Schizoaffective disorder, bipolar type (H)       polyethylene glycol powder    MIRALAX/GLYCOLAX    527 g    TAKE 8.5 GRAMS (1/2 CAPFUL) BY MOUTH DAILY    Constipation, unspecified constipation type       prochlorperazine 5 MG tablet    COMPAZINE    90 tablet    Take 1 tablet (5 mg) by mouth every 6 hours as needed for nausea or vomiting    Nausea       ranitidine 300 MG tablet    ZANTAC    90 tablet    TAKE 1 TABLET (300MG) BY MOUTH AT BEDTIME    Gastroesophageal reflux disease without esophagitis       senna-docusate 8.6-50 MG per tablet    SENOKOT-S;PERICOLACE     Take 1 tablet by mouth 2 times daily as  needed for constipation        sertraline 100 MG tablet    ZOLOFT    60 tablet    Take 1.5 tablets (150 mg) by mouth daily    Schizoaffective disorder, bipolar type (H)       SUMAtriptan 25 MG tablet    IMITREX    12 tablet    Take 1-2 tablets (25-50 mg) by mouth at onset of headache for migraine May repeat in 2 hours. Max 8 tablets/24 hours.    Migraine with aura and without status migrainosus, not intractable       TRULICITY 1.5 MG/0.5ML pen   Generic drug:  dulaglutide     2 mL    INJECT 1.5MG SUBCUTANEOUSLY EVERY SEVEN DAYS    Type 2 diabetes mellitus with mild nonproliferative retinopathy without macular edema, with long-term current use of insulin, unspecified laterality (H)       vitamin D3 78770 UNITS capsule    CHOLECALCIFEROL    12 capsule    TAKE 1 CAPSULE (50,000 UNITS) MONTHLY    Vitamin D deficiency       * Notice:  This list has 4 medication(s) that are the same as other medications prescribed for you. Read the directions carefully, and ask your doctor or other care provider to review them with you.

## 2018-04-25 NOTE — PROGRESS NOTES
I have confirmed the patient's CC, HPI and reviewed Past Medical History, Past Surgical History, Social History, Family History, Problem List, Medication List and agree with Tech note.    CC: Follow up Diabetes mellitus retinopathy  HPI: 59YO F Hx of DMII here for diabetic exam. DMII x 13 years. On oral and insulin. Last hgA1c 8.9% in 12/9/17. Glucose under more control. Hoping to get cataract surgery with Dr. Diaz now.    OCT Macula 4/25/18  OD: center involving intra-retinal fluid temporal macula with slight interval worsening, no subretinal fluid, exudates  OS: temporal intraretinal fluid surrounding an MA    Assessment/plan   1. 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 Myopia OS with secondary amblyopia              Does not wear glasses normally     4.  Pseudophakia both eyes    - Mild PCO in both eyes    - Monitor    Return 2 months for exam and OCT OU         Complete documentation of historical and exam elements from today's encounter can be found in the full encounter summary report (not reduplicated in this progress note).  I personally obtained the chief complaint(s) and history of present illness.  I confirmed and edited as necessary the review of systems, past medical/surgical history, family history, social history, and examination findings as documented by others; and I examined the patient myself.  I personally reviewed the relevant tests, images, and reports as documented above.  I formulated and edited as necessary the assessment and plan and discussed the findings and management plan with the patient and family    Isaac Payan MD  PhD  Vitreoretinal Surgery Fellow  [facility]

## 2018-04-25 NOTE — NURSING NOTE
Chief Complaints and History of Present Illnesses   Patient presents with     Follow Up For     severe NPDR     HPI    Affected eye(s):  Both   Symptoms:        Frequency:  Constant       Do you have eye pain now?:  No      Comments:  Feels va is more blurry  +floaters but no changes   this am  Lab Results       Component                Value               Date                       A1C                      6.8                 02/12/2018                 A1C                      8.9                 12/09/2017                 A1C                      8.9                 11/07/2017                 A1C                      8.8                 10/23/2017                 A1C                      7.6                 08/30/2017            Dina Zambrano COT 9:04 AM April 25, 2018

## 2018-04-30 ENCOUNTER — HOSPITAL ENCOUNTER (OUTPATIENT)
Dept: PHYSICAL THERAPY | Facility: CLINIC | Age: 58
Setting detail: THERAPIES SERIES
End: 2018-04-30
Attending: NURSE PRACTITIONER
Payer: MEDICARE

## 2018-04-30 PROCEDURE — 97110 THERAPEUTIC EXERCISES: CPT | Mod: GP | Performed by: PHYSICAL THERAPIST

## 2018-04-30 PROCEDURE — 97112 NEUROMUSCULAR REEDUCATION: CPT | Mod: GP | Performed by: PHYSICAL THERAPIST

## 2018-04-30 PROCEDURE — 40000718 ZZHC STATISTIC PT DEPARTMENT ORTHO VISIT: Performed by: PHYSICAL THERAPIST

## 2018-05-01 ENCOUNTER — TRANSFERRED RECORDS (OUTPATIENT)
Dept: HEALTH INFORMATION MANAGEMENT | Facility: CLINIC | Age: 58
End: 2018-05-01

## 2018-05-02 ENCOUNTER — HOSPITAL ENCOUNTER (OUTPATIENT)
Dept: PHYSICAL THERAPY | Facility: CLINIC | Age: 58
Setting detail: THERAPIES SERIES
End: 2018-05-02
Attending: NURSE PRACTITIONER
Payer: MEDICARE

## 2018-05-02 PROCEDURE — 40000718 ZZHC STATISTIC PT DEPARTMENT ORTHO VISIT: Performed by: PHYSICAL THERAPIST

## 2018-05-02 PROCEDURE — 97112 NEUROMUSCULAR REEDUCATION: CPT | Mod: GP | Performed by: PHYSICAL THERAPIST

## 2018-05-02 PROCEDURE — 97110 THERAPEUTIC EXERCISES: CPT | Mod: GP | Performed by: PHYSICAL THERAPIST

## 2018-05-08 ENCOUNTER — OFFICE VISIT (OUTPATIENT)
Dept: BEHAVIORAL HEALTH | Facility: CLINIC | Age: 58
End: 2018-05-08
Payer: MEDICARE

## 2018-05-08 DIAGNOSIS — F43.10 POSTTRAUMATIC STRESS DISORDER: ICD-10-CM

## 2018-05-08 DIAGNOSIS — F25.1 SCHIZOAFFECTIVE DISORDER, DEPRESSIVE TYPE (H): Primary | ICD-10-CM

## 2018-05-08 PROCEDURE — 90832 PSYTX W PT 30 MINUTES: CPT | Performed by: SOCIAL WORKER

## 2018-05-08 NOTE — MR AVS SNAPSHOT
After Visit Summary   5/8/2018    Nena Tang    MRN: [id number]           Patient Information     Date Of Birth          1960        Visit Information        Provider Department      5/8/2018 11:00 AM Richardson Lopez, [facility]        Today's Diagnoses     Schizoaffective disorder, depressive type (H)    -  1    Posttraumatic stress disorder           Follow-ups after your visit        Your next 10 appointments already scheduled     May 09, 2018 10:00 AM CDT   Ortho Treatment with Kiko Gallego, PT   [facility] ([facility])    [address] [address]   [phone]            May 14, 2018 10:30 AM CDT   Ortho Treatment with Kiko Gallego, PT   [facility] ([facility])    [address] [address]   [phone]            May 16, 2018 10:45 AM CDT   Ortho Treatment with Kiko Gallego, PT   [facility] ([facility])    [address] [address]   [phone]            May 22, 2018  1:30 PM CDT   Return Visit with ART Arias CNP   [facility] ([facility])    [address]  [address]  [facility] [address]   [phone]            May 22, 2018  1:30 PM CDT   Return Visit with Richardson Lopez [facility] ([facility])    [address]  [address]  [facility] [address]   [phone]            May 23, 2018 10:45 AM CDT   Ortho Treatment with Agatha Pierce PT   [facility] ([facility])    [address] [address]   [phone]            May 30, 2018 10:45 AM CDT   Ortho Treatment with Kiko Gallego, PT   [facility] ([facility])    [address] [facility] [address]   [phone]            Jun 06, 2018 10:45 AM CDT   Ortho Treatment with Kiko Gallego PT   [facility] ([facility])    [address]  [facility] [address]   [phone]            Jun 07, 2018  4:15 PM CDT   (Arrive by 4:00 PM)   New Weight Management Visit with Debbie Germain PA-C   [facility] ([facility])    [address]  4th [address]  [facility] [address] [phone]            Jun 08, 2018  3:30 PM CDT   Return Visit with Kraig Pedersen MD   [facility] ([facility])    [address] [facility] [address] [phone]              Who to contact     If you have questions or need follow up information about today's clinic visit or your schedule please contact [facility] directly at [phone].  Normal or non-critical lab and imaging results will be communicated to you by Molecular Detectionhart, letter or phone within 4 business days after the clinic has received the results. If you do not hear from us within 7 days, please contact the clinic through TastyKhanat or phone. If you have a critical or abnormal lab result, we will notify you by phone as soon as possible.  Submit refill requests through Intellinote or call your pharmacy and they will forward the refill request to us. Please allow 3 business days for your refill to be completed.          Additional Information About Your Visit        Molecular Detectionhart Information     Intellinote gives you secure access to your electronic health record. If you see a primary care provider, you can also send messages to your care team and make appointments. If you have questions, please call your primary care clinic.  If you do not have a primary care provider, please call [phone] and they will assist you.        Care EveryWhere  ID     This is your Care EveryWhere ID. This could be used by other organizations to access your [place] medical records  [id number]        Your Vitals Were     Last Period                   01/06/2015            Blood Pressure from Last 3 Encounters:   04/17/18 98/64   03/13/18 112/72   02/12/18 110/62    Weight from Last 3 Encounters:   04/17/18 234 lb (106.1 kg)   03/13/18 236 lb (107 kg)   02/12/18 228 lb 8 oz (103.6 kg)              Today, you had the following     No orders found for display       Primary Care Provider Office Phone # Fax #    ART Arias [phone]       [address] [address]        Goals        General    Medical (pt-stated)     Notes - Note created  7/7/2016  9:15 AM by Chelsea Pulido RN    Get blood sugars under control    Improve medication compliance    As of today's date 7/7/2016 goal is met at 0 - 25%.   Goal Status:  Active          Equal Access to Services     [facility]: Hadii aad ku hadasho Soomaali, waaxda luqadaha, qaybta kaalmada adeegyada, waxmichelle bacain hayfernando ellison . So [facility] [phone].    ATENCIÓN: Si habla español, tiene a trinh disposición servicios gratuitos de asistencia lingüística. Llame al [phone].    We comply with applicable federal civil rights laws and [place] laws. We do not discriminate on the basis of race, color, national origin, age, disability, sex, sexual orientation, or gender identity.            Thank you!     Thank you for choosing [facility]  for your care. Our goal is always to provide you with excellent care. Hearing back from our patients is one way we can continue to improve our services. Please take a few minutes to complete the written survey that you may receive in the mail after your visit with us. Thank you!             Your Updated Medication List - Protect others around you: Learn how to safely use, store and throw away your  medicines at www.disposemymeds.org.          This list is accurate as of 5/8/18  1:12 PM.  Always use your most recent med list.                   Brand Name Dispense Instructions for use Diagnosis    * ACETAMINOPHEN PO      Take 1,000 mg by mouth every 6 hours as needed for pain or fever        * acetaminophen 500 MG tablet    TYLENOL    100 tablet    Take 2 tablets (1,000 mg) by mouth 3 times daily as needed for mild pain    Chronic low back pain, unspecified back pain laterality, with sciatica presence unspecified       albuterol 108 (90 Base) MCG/ACT Inhaler    PROAIR HFA/PROVENTIL HFA/VENTOLIN HFA    3 Inhaler    Inhale 2 puffs into the lungs every 6 hours as needed for shortness of breath / dyspnea or wheezing    Dyspnea on exertion       aspirin 81 MG EC tablet     28 tablet    TAKE 1 TABLET (81MG) BY MOUTH DAILY    Coronary artery disease involving native heart with angina pectoris, unspecified vessel or lesion type (H)       atorvastatin 80 MG tablet    LIPITOR    28 tablet    TAKE 1 TABLET (80MG) BY MOUTH DAILY    Hyperlipidemia LDL goal <100       benztropine 0.5 MG tablet    COGENTIN    60 tablet    Take 1 tablet (0.5 mg) by mouth 2 times daily    Extrapyramidal and movement disorder       blood glucose monitoring lancets     150 each    Use to test blood sugar 2 times daily or as directed.  Ok to substitute alternative if insurance prefers.    Type 2 diabetes mellitus with diabetic neuropathy, with long-term current use of insulin (H)       blood glucose monitoring test strip    ONETOUCH VERIO IQ    200 strip    Use to test blood sugar 4 times daily .  Ok to substitute alternative if insurance prefers.    Type 2 diabetes mellitus with diabetic neuropathy, with long-term current use of insulin (H)       calcium carbonate 1500 (600 Ca) MG tablet    OS-ALLEN 600 mg [place] IRA. Ca    180 tablet    Take 1 tablet (1,500 mg) by mouth daily    Other osteoporosis without current pathological fracture       clotrimazole  1 % cream    LOTRIMIN     Apply topically 2 times daily        DIPHENDRYL PO      Take 25 mg by mouth every 6 hours as needed for itching        doxycycline 100 MG capsule    VIBRAMYCIN    14 capsule    Take 1 capsule (100 mg) by mouth 2 times daily    Folliculitis       gabapentin 300 MG capsule    NEURONTIN    168 capsule    TAKE 2 CAPSULES (600MG) BY MOUTH THREE TIMES A DAY    Type 2 diabetes mellitus with diabetic neuropathy, with long-term current use of insulin (H)       glucose 4 g Chew chewable tablet     20 tablet    Take 2 every 15 minutes for blood sugar <70mg/dL. Recheck blood sugar every 15 minutes until above 70mg/dL, then eat a substantial meal.    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, with long-term current use of insulin, unspecified laterality (H)       insulin pen needle 30G X 8 MM    NOVOFINE 30    400 each    USE 4 DAILY OR AS DIRECTED    Type 2 diabetes mellitus with mild nonproliferative retinopathy without macular edema, with long-term current use of insulin, unspecified laterality (H)       losartan 100 MG tablet    COZAAR    28 tablet    TAKE 1  TABLET (100MG) BY MOUTH DAILY    Coronary artery disease involving native heart with angina pectoris, unspecified vessel or lesion type (H)       melatonin 5 MG Caps     180 capsule    Take 2 capsules by mouth daily At dinnertime    Insomnia, unspecified type       metoprolol succinate 50 MG 24 hr tablet    TOPROL-XL    60 tablet    Take 1 tablet (50 mg) by mouth daily    Coronary artery disease involving native heart with angina pectoris, unspecified vessel or lesion type (H)       * order for DME     1 each    Hold Novolog if meals are refused.    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    Generalized muscle weakness       paliperidone 9 MG 24 hr tablet    INVEGA    30 tablet    Take 1 tablet (9 mg) by mouth every morning    Schizoaffective disorder, bipolar type (H)       polyethylene glycol powder    MIRALAX/GLYCOLAX    527 g    TAKE 8.5 GRAMS (1/2 CAPFUL) BY MOUTH DAILY    Constipation, unspecified constipation type       prochlorperazine 5 MG tablet    COMPAZINE    90 tablet    Take 1 tablet (5 mg) by mouth every 6 hours as needed for nausea or vomiting    Nausea       ranitidine 300 MG tablet    ZANTAC    90 tablet    TAKE 1 TABLET (300MG) BY MOUTH AT BEDTIME    Gastroesophageal reflux disease without esophagitis       senna-docusate 8.6-50 MG per tablet    SENOKOT-S;PERICOLACE     Take 1 tablet by mouth 2 times daily as needed for constipation        sertraline 100 MG tablet    ZOLOFT    60 tablet    Take 1.5 tablets (150 mg) by mouth daily    Schizoaffective disorder, bipolar type (H)        SUMAtriptan 25 MG tablet    IMITREX    12 tablet    Take 1-2 tablets (25-50 mg) by mouth at onset of headache for migraine May repeat in 2 hours. Max 8 tablets/24 hours.    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 Read the directions carefully, and ask your doctor or other care provider to review them with you.
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
## 2018-05-09 ENCOUNTER — HOSPITAL ENCOUNTER (OUTPATIENT)
Dept: PHYSICAL THERAPY | Facility: CLINIC | Age: 58
Setting detail: THERAPIES SERIES
End: 2018-05-09
Attending: NURSE PRACTITIONER
Payer: MEDICARE

## 2018-05-09 DIAGNOSIS — K21.9 GASTROESOPHAGEAL REFLUX DISEASE WITHOUT ESOPHAGITIS: ICD-10-CM

## 2018-05-09 PROCEDURE — 97112 NEUROMUSCULAR REEDUCATION: CPT | Mod: GP | Performed by: PHYSICAL THERAPIST

## 2018-05-09 PROCEDURE — 97110 THERAPEUTIC EXERCISES: CPT | Mod: GP | Performed by: PHYSICAL THERAPIST

## 2018-05-09 PROCEDURE — 40000718 ZZHC STATISTIC PT DEPARTMENT ORTHO VISIT: Performed by: PHYSICAL THERAPIST

## 2018-05-22 ENCOUNTER — OFFICE VISIT (OUTPATIENT)
Dept: BEHAVIORAL HEALTH | Facility: CLINIC | Age: 58
End: 2018-05-22
Payer: MEDICARE

## 2018-05-22 ENCOUNTER — OFFICE VISIT (OUTPATIENT)
Dept: FAMILY MEDICINE | Facility: CLINIC | Age: 58
End: 2018-05-22
Payer: MEDICARE

## 2018-05-22 ENCOUNTER — TELEPHONE (OUTPATIENT)
Dept: FAMILY MEDICINE | Facility: CLINIC | Age: 58
End: 2018-05-22

## 2018-05-22 VITALS
DIASTOLIC BLOOD PRESSURE: 74 MMHG | SYSTOLIC BLOOD PRESSURE: 108 MMHG | WEIGHT: 236 LBS | BODY MASS INDEX: 45.94 KG/M2 | OXYGEN SATURATION: 96 % | RESPIRATION RATE: 16 BRPM | HEART RATE: 79 BPM | TEMPERATURE: 98.4 F

## 2018-05-22 DIAGNOSIS — Z79.4 TYPE 2 DIABETES MELLITUS WITH HYPERGLYCEMIA, WITH LONG-TERM CURRENT USE OF INSULIN (H): Primary | ICD-10-CM

## 2018-05-22 DIAGNOSIS — N89.8 VAGINAL DISCHARGE: ICD-10-CM

## 2018-05-22 DIAGNOSIS — F25.1 SCHIZOAFFECTIVE DISORDER, DEPRESSIVE TYPE (H): Primary | ICD-10-CM

## 2018-05-22 DIAGNOSIS — F43.10 POSTTRAUMATIC STRESS DISORDER: ICD-10-CM

## 2018-05-22 DIAGNOSIS — Z12.4 SCREENING FOR CERVICAL CANCER: ICD-10-CM

## 2018-05-22 DIAGNOSIS — E11.65 TYPE 2 DIABETES MELLITUS WITH HYPERGLYCEMIA, WITH LONG-TERM CURRENT USE OF INSULIN (H): Primary | ICD-10-CM

## 2018-05-22 DIAGNOSIS — M62.81 GENERALIZED MUSCLE WEAKNESS: ICD-10-CM

## 2018-05-22 LAB
HBA1C MFR BLD: 6.2 % (ref 0–5.6)
SPECIMEN SOURCE: NORMAL
WET PREP SPEC: NORMAL

## 2018-05-22 PROCEDURE — 87210 SMEAR WET MOUNT SALINE/INK: CPT | Performed by: NURSE PRACTITIONER

## 2018-05-22 PROCEDURE — G0145 SCR C/V CYTO,THINLAYER,RESCR: HCPCS | Performed by: NURSE PRACTITIONER

## 2018-05-22 PROCEDURE — 83036 HEMOGLOBIN GLYCOSYLATED A1C: CPT | Performed by: NURSE PRACTITIONER

## 2018-05-22 PROCEDURE — 36415 COLL VENOUS BLD VENIPUNCTURE: CPT | Performed by: NURSE PRACTITIONER

## 2018-05-22 PROCEDURE — 87624 HPV HI-RISK TYP POOLED RSLT: CPT | Performed by: NURSE PRACTITIONER

## 2018-05-22 PROCEDURE — 90832 PSYTX W PT 30 MINUTES: CPT | Performed by: SOCIAL WORKER

## 2018-05-22 PROCEDURE — 99215 OFFICE O/P EST HI 40 MIN: CPT | Performed by: NURSE PRACTITIONER

## 2018-05-22 PROCEDURE — G0476 HPV COMBO ASSAY CA SCREEN: HCPCS | Performed by: NURSE PRACTITIONER

## 2018-05-22 NOTE — LETTER
[facility]  [address] [place] [address]  [address]  [facility] [address]  [phone]          June 1, 2018    Nena Tang                                                                                                                     [address] [place] [id number]            Dear ,      I am happy to inform you that your cervical cancer screening test (PAP smear) was normal and your Human Papillomavirus (HPV) test was negative.    Per current guidelines, you no longer need to have pap smears completed. Please return for annual pelvic exams.    Please continue to be seen every year for an annual wellness visit and other preventative tests.     Please contact my office at [phone] if you have further questions.    Sincerely,      ART Fay CNP./  Antionette Lucia RN-Pap Tracking

## 2018-05-22 NOTE — MR AVS SNAPSHOT
After Visit Summary   5/22/2018    Nena Tang    MRN: [id number]           Patient Information     Date Of Birth          1960        Visit Information        Provider Department      5/22/2018 1:30 PM Rowena Haas APRN CNP [facility]        Today's Diagnoses     Type 2 diabetes mellitus with hyperglycemia, with long-term current use of insulin (H)    -  1    Generalized muscle weakness        Screening for cervical cancer          Care Instructions    -Will call with results of the pap.     -Lab today: checking A1C    -Call clinic with any questions or concerns.             Follow-ups after your visit        Your next 10 appointments already scheduled     May 23, 2018 10:45 AM CDT   Ortho Treatment with Agatha Pierce PT   [facility] ([facility])    [address] [facility] [address]   [phone]            Jun 06, 2018 10:45 AM CDT   Ortho Treatment with Kiko Gallego, CISCO   [facility] ([facility])    [address] [facility] [address]   [phone]            Jun 07, 2018  4:15 PM CDT   (Arrive by 4:00 PM)   New Weight Management Visit with Debbie Germain PA-C   [facility] ([facility])    [address] [address] [phone]            Jun 08, 2018  3:30 PM CDT   Return Visit with Kraig Pedersen MD   [facility] ([facility])    [address] [facility] [address] [phone]            Jun 13, 2018 10:00 AM CDT   Ortho Treatment with Kiko Gallego, PT   [facility] ([facility])    [address] [facility] [address]   [phone]            Jun 18, 2018  3:30 PM CDT   RETURN RETINA with Tiburcio Chacon MD   [facility] ([facility])    [address]  [address]  [facility] [address]   [phone]            Jun 20, 2018 10:00 AM CDT   Ortho Treatment with Kiko Gallego PT   [facility] ([facility])    [address]  [facility] [address] [phone]              Who to contact     If you have questions or need follow up information about today's clinic visit or your schedule please contact [facility] directly at [phone].  Normal or non-critical lab and imaging results will be communicated to you by MyChart, letter or phone within 4 business days after the clinic has received the results. If you do not hear from us within 7 days, please contact the clinic through backstitchhart or phone. If you have a critical or abnormal lab result, we will notify you by phone as soon as possible.  Submit refill requests through NCR or call your pharmacy and they will forward the refill request to us. Please allow 3 business days for your refill to be completed.          Additional Information About Your Visit        MyChart Information     NCR gives you secure access to your electronic health record. If you see a primary care provider, you can also send messages to your care team and make appointments. If you have questions, please call your primary care clinic.  If you do not have a primary care provider, please call [phone] and they will assist you.        Care EveryWhere ID     This is your Care EveryWhere ID. This could be used by other organizations to access your [place] medical records  [id number]        Your Vitals Were     Pulse Temperature Respirations Last Period Pulse Oximetry BMI (Body Mass Index)    79 98.4  F (36.9  C) (Oral) 16 01/06/2015 96% 45.94 kg/m2       Blood Pressure from Last 3 Encounters:   05/22/18 108/74   04/17/18 98/64   03/13/18 112/72    Weight from Last 3 Encounters:   05/22/18  236 lb (107 kg)   04/17/18 234 lb (106.1 kg)   03/13/18 236 lb (107 kg)              We Performed the Following     **A1C FUTURE 1yr     HPV High Risk Types DNA Cervical     Pap imaged thin layer screen with HPV - recommended age 30 - 65 years (select HPV order below)          Where to get your medicines      Some of these will need a paper prescription and others can be bought over the counter.  Ask your nurse if you have questions.     Bring a paper prescription for each of these medications     order for DME          Primary Care Provider Office Phone # Fax #    Rowena Haas ART [phone]       [address] [facility] [address]  [facility] [address]        Goals        General    Medical (pt-stated)     Notes - Note created  7/7/2016  9:15 AM by Chelsea Pulido RN    Get blood sugars under control    Improve medication compliance    As of today's date 7/7/2016 goal is met at 0 - 25%.   Goal Status:  Active          Equal Access to Services     [facility]: Hadii aad ku hadasho Soomaali, waaxda luqadaha, qaybta kaalmada adeegyada, waxay idiin hayxiomaran yahir ellison . So [facility] [phone].    ATENCIÓN: Si habla español, tiene a trinh disposición servicios gratuitos de asistencia lingüística. Llame al [phone].    We comply with applicable federal civil rights laws and [place] laws. We do not discriminate on the basis of race, color, national origin, age, disability, sex, sexual orientation, or gender identity.            Thank you!     Thank you for choosing [facility]  for your care. Our goal is always to provide you with excellent care. Hearing back from our patients is one way we can continue to improve our services. Please take a few minutes to complete the written survey that you may receive in the mail after your visit with us. Thank you!             Your Updated Medication List - Protect others around you: Learn how to safely use, store and throw  away your medicines at www.disposemymeds.org.          This list is accurate as of 5/22/18  2:32 PM.  Always use your most recent med list.                   Brand Name Dispense Instructions for use Diagnosis    * ACETAMINOPHEN PO      Take 1,000 mg by mouth every 6 hours as needed for pain or fever        * acetaminophen 500 MG tablet    TYLENOL    100 tablet    Take 2 tablets (1,000 mg) by mouth 3 times daily as needed for mild pain    Chronic low back pain, unspecified back pain laterality, with sciatica presence unspecified       albuterol 108 (90 Base) MCG/ACT Inhaler    PROAIR HFA/PROVENTIL HFA/VENTOLIN HFA    3 Inhaler    Inhale 2 puffs into the lungs every 6 hours as needed for shortness of breath / dyspnea or wheezing    Dyspnea on exertion       aspirin 81 MG EC tablet     28 tablet    TAKE 1 TABLET (81MG) BY MOUTH DAILY    Coronary artery disease involving native heart with angina pectoris, unspecified vessel or lesion type (H)       atorvastatin 80 MG tablet    LIPITOR    28 tablet    TAKE 1 TABLET (80MG) BY MOUTH DAILY    Hyperlipidemia LDL goal <100       benztropine 0.5 MG tablet    COGENTIN    60 tablet    Take 1 tablet (0.5 mg) by mouth 2 times daily    Extrapyramidal and movement disorder       blood glucose monitoring lancets     150 each    Use to test blood sugar 2 times daily or as directed.  Ok to substitute alternative if insurance prefers.    Type 2 diabetes mellitus with diabetic neuropathy, with long-term current use of insulin (H)       blood glucose monitoring test strip    ONETOUCH VERIO IQ    200 strip    Use to test blood sugar 4 times daily .  Ok to substitute alternative if insurance prefers.    Type 2 diabetes mellitus with diabetic neuropathy, with long-term current use of insulin (H)       calcium carbonate 1500 (600 Ca) MG tablet    OS-ALLEN 600 mg Resighini. Ca    180 tablet    Take 1 tablet (1,500 mg) by mouth daily    Other osteoporosis without current pathological fracture        clotrimazole 1 % cream    LOTRIMIN     Apply topically 2 times daily        DIPHENDRYL PO      Take 25 mg by mouth every 6 hours as needed for itching        doxycycline 100 MG capsule    VIBRAMYCIN    14 capsule    Take 1 capsule (100 mg) by mouth 2 times daily    Folliculitis       gabapentin 300 MG capsule    NEURONTIN    168 capsule    TAKE 2 CAPSULES (600MG) BY MOUTH THREE TIMES A DAY    Type 2 diabetes mellitus with diabetic neuropathy, with long-term current use of insulin (H)       glucose 4 g Chew chewable tablet     20 tablet    Take 2 every 15 minutes for blood sugar <70mg/dL. Recheck blood sugar every 15 minutes until above 70mg/dL, then eat a substantial meal.    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, with long-term current use of insulin, unspecified laterality (H)       insulin pen needle 30G X 8 MM    NOVOFINE 30    400 each    USE 4 DAILY OR AS DIRECTED    Type 2 diabetes mellitus with mild nonproliferative retinopathy without macular edema, with long-term current use of insulin, unspecified laterality (H)       losartan 100 MG tablet    COZAAR    28 tablet     TAKE 1 TABLET (100MG) BY MOUTH DAILY    Coronary artery disease involving native heart with angina pectoris, unspecified vessel or lesion type (H)       melatonin 5 MG Caps     180 capsule    Take 2 capsules by mouth daily At dinnertime    Insomnia, unspecified type       metoprolol succinate 50 MG 24 hr tablet    TOPROL-XL    60 tablet    Take 1 tablet (50 mg) by mouth daily    Coronary artery disease involving native heart with angina pectoris, unspecified vessel or lesion type (H)       * order for DME     1 each    Hold Novolog if meals are refused.    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    Generalized muscle weakness       paliperidone 9 MG 24 hr tablet    INVEGA    30 tablet    Take 1 tablet (9 mg) by mouth every morning    Schizoaffective disorder, bipolar type (H)       polyethylene glycol powder    MIRALAX/GLYCOLAX    527 g    TAKE 8.5 GRAMS (1/2 CAPFUL) BY MOUTH DAILY    Constipation, unspecified constipation type       prochlorperazine 5 MG tablet    COMPAZINE    90 tablet    Take 1 tablet (5 mg) by mouth every 6 hours as needed for nausea or vomiting    Nausea       ranitidine 300 MG tablet    ZANTAC    90 tablet    TAKE 1 TABLET (300MG) BY MOUTH AT BEDTIME    Gastroesophageal reflux disease without esophagitis       senna-docusate 8.6-50 MG per tablet    SENOKOT-S;PERICOLACE     Take 1 tablet by mouth 2 times daily as needed for constipation        sertraline 100 MG tablet    ZOLOFT    60 tablet    Take 1.5 tablets (150 mg) by mouth daily    Schizoaffective disorder, bipolar type (H)        SUMAtriptan 25 MG tablet    IMITREX    12 tablet    Take 1-2 tablets (25-50 mg) by mouth at onset of headache for migraine May repeat in 2 hours. Max 8 tablets/24 hours.    Migraine with aura and without status migrainosus, not intractable       TRULICITY 1.5 MG/0.5ML pen   Generic drug:  dulaglutide     2 mL    INJECT 1.5MG SUBCUTANEOUSLY EVERY SEVEN DAYS    Type 2 diabetes mellitus with mild nonproliferative retinopathy without macular edema, with long-term current use of insulin, unspecified laterality (H)       vitamin D3 75252 UNITS capsule    CHOLECALCIFEROL    12 capsule    TAKE 1 CAPSULE (50,000 UNITS) MONTHLY    Vitamin D deficiency       * Notice:  This list has 4 medication(s) that are the same as other medications prescribed for you. Read the directions carefully, and ask your doctor or other care provider to review them with you.

## 2018-05-22 NOTE — MR AVS SNAPSHOT
After Visit Summary   5/22/2018    Nena Tang    MRN: [id number]           Patient Information     Date Of Birth          1960        Visit Information        Provider Department      5/22/2018 1:30 PM Richardson Lopez, [facility]        Today's Diagnoses     Schizoaffective disorder, depressive type (H)    -  1    Posttraumatic stress disorder           Follow-ups after your visit        Your next 10 appointments already scheduled     May 23, 2018 10:45 AM CDT   Ortho Treatment with Agatha Pierce PT   [facility] ([facility])    [address] [address]   [phone]            Jun 06, 2018 10:45 AM CDT   Ortho Treatment with Kiko Gallego PT   [facility] ([facility])    [address] [address]   [phone]            Jun 07, 2018  4:15 PM CDT   (Arrive by 4:00 PM)   New Weight Management Visit with Debbie Germain PA-C   [facility] ([facility])    [address]  [address] [facility] [address]   [phone]            Jun 08, 2018  3:00 PM CDT   Return Visit with Richardson Lopez, [facility] ([facility])    [address]  [address]  [facility] [address]   [phone]            Jun 08, 2018  3:30 PM CDT   Return Visit with Kraig Pedersen MD   [facility] ([facility])    [address]  [facility] [address]   [phone]            Jun 13, 2018 10:00 AM CDT   Ortho Treatment with Kiko Gallego, CISCO   [facility] ([facility])    [address] [address]   [phone]            Jun 18, 2018  3:30 PM CDT   RETURN RETINA with Tiburcio Chacon MD    [facility] ([facility])    [address]  [address]  [facility] [address]   [phone]            Jun 20, 2018 10:00 AM CDT   Ortho Treatment with Kiko Gallego PT   [facility] ([facility])    [address]  [facility] [address]   [phone]            Jun 22, 2018  1:00 PM CDT   Return Visit with ART Arias CNP   [facility] ([facility])    [address]  [address]  [facility] [address] [phone]            Jun 22, 2018  1:00 PM CDT   Return Visit with BIN Mondragon   [facility] ([facility])    [address]  [address]  [facility] [address] [phone]              Who to contact     If you have questions or need follow up information about today's clinic visit or your schedule please contact [facility] directly at [phone].  Normal or non-critical lab and imaging results will be communicated to you by MyChart, letter or phone within 4 business days after the clinic has received the results. If you do not hear from us within 7 days, please contact the clinic through MyChart or phone. If you have a critical or abnormal lab result, we will notify you by phone as soon as possible.  Submit refill requests through [employer] or call your pharmacy and they will forward the refill request to us. Please allow 3 business days for your refill to be completed.          Additional Information About Your Visit        MyChart Information     [employer] gives you secure access to your electronic health record. If you see a primary care provider, you can also send messages to your care team and make appointments. If you have questions, please call your primary care clinic.  If you do not have a primary care provider, please call  [phone] and they will assist you.        Care EveryWhere ID     This is your Care EveryWhere ID. This could be used by other organizations to access your [facility] medical records  [id number]        Your Vitals Were     Last Period                   01/06/2015            Blood Pressure from Last 3 Encounters:   05/22/18 108/74   04/17/18 98/64   03/13/18 112/72    Weight from Last 3 Encounters:   05/22/18 236 lb (107 kg)   04/17/18 234 lb (106.1 kg)   03/13/18 236 lb (107 kg)              Today, you had the following     No orders found for display         Where to get your medicines      Some of these will need a paper prescription and others can be bought over the counter.  Ask your nurse if you have questions.     Bring a paper prescription for each of these medications     order for DME          Primary Care Provider Office Phone # Fax #    Rowena ART Blanton [phone]       [address] [facility] [address]  [facility] [address]        Goals        General    Medical (pt-stated)     Notes - Note created  7/7/2016  9:15 AM by Chelsea Pulido, RN    Get blood sugars under control    Improve medication compliance    As of today's date 7/7/2016 goal is met at 0 - 25%.   Goal Status:  Active          Equal Access to Services     RAMESH GARRIDO AH: Hadii samira ku hadasho Soomaali, waaxda luqadaha, qaybta kaalmada adeegyada, lorena martins. So [facility] [phone].    ATENCIÓN: Si habla español, tiene a trinh disposición servicios gratuitos de asistencia lingüística. Llame al [phone].    We comply with applicable federal civil rights laws and [place] laws. We do not discriminate on the basis of race, color, national origin, age, disability, sex, sexual orientation, or gender identity.            Thank you!     Thank you for choosing [facility]  for your care. Our goal is always to provide you with excellent care. Hearing back from our patients  is one way we can continue to improve our services. Please take a few minutes to complete the written survey that you may receive in the mail after your visit with us. Thank you!             Your Updated Medication List - Protect others around you: Learn how to safely use, store and throw away your medicines at www.disposemymeds.org.          This list is accurate as of 5/22/18  4:19 PM.  Always use your most recent med list.                   Brand Name Dispense Instructions for use Diagnosis    * ACETAMINOPHEN PO      Take 1,000 mg by mouth every 6 hours as needed for pain or fever        * acetaminophen 500 MG tablet    TYLENOL    100 tablet    Take 2 tablets (1,000 mg) by mouth 3 times daily as needed for mild pain    Chronic low back pain, unspecified back pain laterality, with sciatica presence unspecified       albuterol 108 (90 Base) MCG/ACT Inhaler    PROAIR HFA/PROVENTIL HFA/VENTOLIN HFA    3 Inhaler    Inhale 2 puffs into the lungs every 6 hours as needed for shortness of breath / dyspnea or wheezing    Dyspnea on exertion       aspirin 81 MG EC tablet     28 tablet    TAKE 1 TABLET (81MG) BY MOUTH DAILY    Coronary artery disease involving native heart with angina pectoris, unspecified vessel or lesion type (H)       atorvastatin 80 MG tablet    LIPITOR    28 tablet    TAKE 1 TABLET (80MG) BY MOUTH DAILY    Hyperlipidemia LDL goal <100       benztropine 0.5 MG tablet    COGENTIN    60 tablet    Take 1 tablet (0.5 mg) by mouth 2 times daily    Extrapyramidal and movement disorder       blood glucose monitoring lancets     150 each    Use to test blood sugar 2 times daily or as directed.  Ok to substitute alternative if insurance prefers.    Type 2 diabetes mellitus with diabetic neuropathy, with long-term current use of insulin (H)       blood glucose monitoring test strip    ONETOUCH VERIO IQ    200 strip    Use to test blood sugar 4 times daily .  Ok to substitute alternative if insurance prefers.     Type 2 diabetes mellitus with diabetic neuropathy, with long-term current use of insulin (H)       calcium carbonate 1500 (600 Ca) MG tablet    OS-ALLEN 600 mg [place]. Ca    180 tablet    Take 1 tablet (1,500 mg) by mouth daily    Other osteoporosis without current pathological fracture       clotrimazole 1 % cream    LOTRIMIN     Apply topically 2 times daily        DIPHENDRYL PO      Take 25 mg by mouth every 6 hours as needed for itching        doxycycline 100 MG capsule    VIBRAMYCIN    14 capsule    Take 1 capsule (100 mg) by mouth 2 times daily    Folliculitis       gabapentin 300 MG capsule    NEURONTIN    168 capsule    TAKE 2 CAPSULES (600MG) BY MOUTH THREE TIMES A DAY    Type 2 diabetes mellitus with diabetic neuropathy, with long-term current use of insulin (H)       glucose 4 g Chew chewable tablet     20 tablet    Take 2 every 15 minutes for blood sugar <70mg/dL. Recheck blood sugar every 15 minutes until above 70mg/dL, then eat a substantial meal.    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, with long-term current use of insulin, unspecified laterality (H)       insulin  pen needle 30G X 8 MM    NOVOFINE 30    400 each    USE 4 DAILY OR AS DIRECTED    Type 2 diabetes mellitus with mild nonproliferative retinopathy without macular edema, with long-term current use of insulin, unspecified laterality (H)       losartan 100 MG tablet    COZAAR    28 tablet    TAKE 1 TABLET (100MG) BY MOUTH DAILY    Coronary artery disease involving native heart with angina pectoris, unspecified vessel or lesion type (H)       melatonin 5 MG Caps     180 capsule    Take 2 capsules by mouth daily At dinnertime    Insomnia, unspecified type       metoprolol succinate 50 MG 24 hr tablet    TOPROL-XL    60 tablet    Take 1 tablet (50 mg) by mouth daily    Coronary artery disease involving native heart with angina pectoris, unspecified vessel or lesion type (H)       * order for DME     1 each    Hold Novolog if meals are refused.    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    Generalized muscle weakness       paliperidone 9 MG 24 hr tablet    INVEGA    30 tablet    Take 1 tablet (9 mg) by mouth every morning    Schizoaffective disorder, bipolar type (H)       polyethylene glycol powder    MIRALAX/GLYCOLAX    527 g    TAKE 8.5 GRAMS (1/2 CAPFUL) BY MOUTH DAILY    Constipation, unspecified constipation type       prochlorperazine 5 MG tablet    COMPAZINE    90 tablet    Take 1 tablet (5 mg) by mouth every 6 hours as needed for nausea or vomiting    Nausea       ranitidine 300 MG tablet    ZANTAC    90 tablet    TAKE 1 TABLET (300MG) BY MOUTH AT BEDTIME    Gastroesophageal reflux disease without  esophagitis       senna-docusate 8.6-50 MG per tablet    SENOKOT-S;PERICOLACE     Take 1 tablet by mouth 2 times daily as needed for constipation        sertraline 100 MG tablet    ZOLOFT    60 tablet    Take 1.5 tablets (150 mg) by mouth daily    Schizoaffective disorder, bipolar type (H)       SUMAtriptan 25 MG tablet    IMITREX    12 tablet    Take 1-2 tablets (25-50 mg) by mouth at onset of headache for migraine May repeat in 2 hours. Max 8 tablets/24 hours.    Migraine with aura and without status migrainosus, not intractable       TRULICITY 1.5 MG/0.5ML pen   Generic drug:  dulaglutide     2 mL    INJECT 1.5MG SUBCUTANEOUSLY EVERY SEVEN DAYS    Type 2 diabetes mellitus with mild nonproliferative retinopathy without macular edema, with long-term current use of insulin, unspecified laterality (H)       vitamin D3 50223 UNITS capsule    CHOLECALCIFEROL    12 capsule    TAKE 1 CAPSULE (50,000 UNITS) MONTHLY    Vitamin D deficiency       * Notice:  This list has 4 medication(s) that are the same as other medications prescribed for you. Read the directions carefully, and ask your doctor or other care provider to review them with you.

## 2018-05-22 NOTE — PATIENT INSTRUCTIONS
-Will call with results of the pap.     -Lab today: checking A1C    -Call clinic with any questions or concerns.

## 2018-05-22 NOTE — PROGRESS NOTES
[facility]   May 22, 2018      Behavioral Health Clinician Progress Note    Patient Name: Nena Tang           Service Type:  Individual      Service Location:   Face to Face in Clinic     Session Start Time: 1:37pm  Session End Time: 2:15pm      Session Length: 38 - 52      Attendees: Patient    Visit Activities (Refresh list every visit): [facility]    Diagnostic Assessment Date: 1-23-18  Treatment Plan Review Date: Not completed yet  See Flowsheets for today's PHQ-9 and TAMIA-7 results  Previous PHQ-9:   PHQ-9 SCORE 1/2/2017 2/3/2017 3/13/2018   Total Score - - -   Total Score - - -   Total Score 0 0 14     Previous TAMIA-7:   TAMIA-7 SCORE 1/2/2017 2/3/2017 3/13/2018   Total Score - - -   Total Score 0 0 17   Total Score - - -       ALEXANDRA LEVEL:  ALEXANDRA Score (Last Two) 8/30/2011 6/9/2014   ALEXANDRA Raw Score 42 37   Activation Score 66 49.9   ALEXANDRA Level 3 2       DATA  Extended Session (60+ minutes): No  Interactive Complexity: No  Crisis: No  [facility] Patient: No    Treatment Objective(s) Addressed in This Session:  Target Behavior(s): disease management/lifestyle changes mental health    Depressed Mood: Increase interest, engagement, and pleasure in doing things  Decrease frequency and intensity of feeling down, depressed, hopeless  Improve quantity and quality of night time sleep / decrease daytime naps  Feel less tired and more energy during the day   Identify negative self-talk and behaviors: challenge core beliefs, myths, and actions  Improve concentration, focus, and mindfulness in daily activities   Decrease thoughts that you'd be better off dead or of suicide / self-harm  Anxiety: will experience a reduction in anxiety, will develop more effective coping skills to manage anxiety symptoms, will develop healthy cognitive patterns and beliefs and will increase ability to function adaptively  Alcohol / Substance Use: continue to make healthy choices regarding substance use and engage in  activities / supportive services that promote sobriety  Thought Disturbance: will develop skills to more effectively manage symptoms, will develop more effective support systems and will continue to take medications as prescribed    Current Stressors / Issues:    The patient was seen by [facility] per the request of the PCP-she does not drink much water and the result is her blood pressure goes down-the owner of her current placement is focused on helping to remind the patient to consume water during the day-the PCP gave the patient some health information to encourage her to drink more water-the patient does not like the taste of water-the patient was able to state that she wants the benefit of the water and that she will start consuming more water today-her staff is encouraging her to loose weight with exercise-she was able to put words to her what she wants from all the work she will put forth for rehab from medical condition-she is excited about having an opportunity for employment.        Plan: command him to leave and then engage life with interactions to distract and fill your attention.                Progress on Treatment Objective(s) / Homework:  Minimal progress - ACTION (Actively working towards change); Intervened by reinforcing change plan / affirming steps taken    Motivational Interviewing    MI Intervention: Expressed Empathy/Understanding, Supported Autonomy, Collaboration, Evocation, Permission to raise concern or advise, Open-ended questions, Reflections: simple and complex, Rolled with resistance: Emphasized patient autonomy, Simple reflection and Evoked patient agenda and Change talk (evoked)     Change Talk Expressed by the Patient: Desire to change Ability to change Reasons to change Need to change Committment to change Activation Taking steps    Provider Response to Change Talk: E - Evoked more info from patient about behavior change, A - Affirmed patient's thoughts, decisions, or attempts at  behavior change, R - Reflected patient's change talk and S - Summarized patient's change talk statements      Care Plan review completed: No    Medication Review:  No changes to current psychiatric medication(s)   Medication Compliance:  NA    Changes in Health Issues:   None reported     Chemical Use Review:   Substance Use: Chemical use reviewed, no active concerns identified      Tobacco Use: No current tobacco use.      Assessment: Current Emotional / Mental Status (status of significant symptoms):  Risk status (Self / Other harm or suicidal ideation)  Patient has had a history of suicidal ideation: yes  Patient denies current fears or concerns for personal safety.  Patient reports the following current or recent suicidal ideation or behaviors: no intent to harm the self at this time.  Patient denies current or recent homicidal ideation or behaviors.  Patient denies current or recent self injurious behavior or ideation.  Patient denies other safety concerns.  A safety and risk management plan has not been developed at this time, however patient was encouraged to call [place] [phone] should there be a change in any of these risk factors.    Appearance:   Appropriate   Eye Contact:   Good   Psychomotor Behavior: Normal   Attitude:   Cooperative   Orientation:   All  Speech   Rate / Production: Normal    Volume:  Normal   Mood:    Normal  Affect:    Appropriate  Bright   Thought Content:  Clear   Thought Form:  Coherent   Insight:    Fair     Diagnoses:  1. Schizoaffective disorder, depressive type (H)    2. Posttraumatic stress disorder        Collateral Reports Completed:  Not Applicable    Plan: (Homework, other):  Patient was given information about behavioral services and encouraged to schedule a follow up appointment with the clinic [facility] as needed.  She was also given information about mental health symptoms and treatment options .  CD Recommendations: Maintain Sobriety.    BIN George,  [facility]      ______________________________________________________________________

## 2018-05-23 ENCOUNTER — HOSPITAL ENCOUNTER (OUTPATIENT)
Dept: PHYSICAL THERAPY | Facility: CLINIC | Age: 58
Setting detail: THERAPIES SERIES
End: 2018-05-23
Attending: NURSE PRACTITIONER
Payer: MEDICARE

## 2018-05-23 PROCEDURE — 97110 THERAPEUTIC EXERCISES: CPT | Mod: GP | Performed by: PHYSICAL THERAPIST

## 2018-05-23 PROCEDURE — 97116 GAIT TRAINING THERAPY: CPT | Mod: GP | Performed by: PHYSICAL THERAPIST

## 2018-05-23 PROCEDURE — 40000185 ZZHC STATISTIC PT OUTPT VISIT: Performed by: PHYSICAL THERAPIST

## 2018-05-23 PROCEDURE — 97112 NEUROMUSCULAR REEDUCATION: CPT | Mod: GP | Performed by: PHYSICAL THERAPIST

## 2018-05-24 LAB
COPATH REPORT: NORMAL
PAP: NORMAL

## 2018-05-28 ENCOUNTER — HOSPITAL ENCOUNTER (EMERGENCY)
Facility: CLINIC | Age: 58
Discharge: HOME | End: 2018-05-28
Attending: EMERGENCY MEDICINE | Admitting: EMERGENCY MEDICINE
Payer: MEDICARE

## 2018-05-28 ENCOUNTER — APPOINTMENT (OUTPATIENT)
Dept: GENERAL RADIOLOGY | Facility: CLINIC | Age: 58
End: 2018-05-28
Attending: EMERGENCY MEDICINE
Payer: MEDICARE

## 2018-05-28 VITALS
WEIGHT: 230 LBS | BODY MASS INDEX: 45.16 KG/M2 | RESPIRATION RATE: 16 BRPM | OXYGEN SATURATION: 98 % | HEART RATE: 77 BPM | SYSTOLIC BLOOD PRESSURE: 129 MMHG | DIASTOLIC BLOOD PRESSURE: 70 MMHG | TEMPERATURE: 98.3 F | HEIGHT: 60 IN

## 2018-05-28 DIAGNOSIS — R42 LIGHTHEADEDNESS: ICD-10-CM

## 2018-05-28 LAB
ALBUMIN SERPL-MCNC: 3.3 G/DL (ref 3.4–5)
ALBUMIN UR-MCNC: NEGATIVE MG/DL
ALP SERPL-CCNC: 88 U/L (ref 40–150)
ALT SERPL W P-5'-P-CCNC: 27 U/L (ref 0–50)
ANION GAP SERPL CALCULATED.3IONS-SCNC: 8 MMOL/L (ref 3–14)
APPEARANCE UR: CLEAR
AST SERPL W P-5'-P-CCNC: 18 U/L (ref 0–45)
BACTERIA #/AREA URNS HPF: ABNORMAL /HPF
BASOPHILS # BLD AUTO: 0 10E9/L (ref 0–0.2)
BASOPHILS NFR BLD AUTO: 0.4 %
BILIRUB SERPL-MCNC: <0.1 MG/DL (ref 0.2–1.3)
BILIRUB UR QL STRIP: NEGATIVE
BUN SERPL-MCNC: 22 MG/DL (ref 7–30)
CALCIUM SERPL-MCNC: 8.6 MG/DL (ref 8.5–10.1)
CHLORIDE SERPL-SCNC: 104 MMOL/L (ref 94–109)
CO2 SERPL-SCNC: 24 MMOL/L (ref 20–32)
COLOR UR AUTO: ABNORMAL
CREAT SERPL-MCNC: 1.16 MG/DL (ref 0.52–1.04)
DIFFERENTIAL METHOD BLD: ABNORMAL
EOSINOPHIL # BLD AUTO: 0.1 10E9/L (ref 0–0.7)
EOSINOPHIL NFR BLD AUTO: 1.7 %
ERYTHROCYTE [DISTWIDTH] IN BLOOD BY AUTOMATED COUNT: 12.9 % (ref 10–15)
GFR SERPL CREATININE-BSD FRML MDRD: 48 ML/MIN/1.7M2
GLUCOSE SERPL-MCNC: 195 MG/DL (ref 70–99)
GLUCOSE UR STRIP-MCNC: NEGATIVE MG/DL
HCT VFR BLD AUTO: 31.7 % (ref 35–47)
HGB BLD-MCNC: 10.2 G/DL (ref 11.7–15.7)
HGB UR QL STRIP: NEGATIVE
HYALINE CASTS #/AREA URNS LPF: 1 /LPF (ref 0–2)
IMM GRANULOCYTES # BLD: 0 10E9/L (ref 0–0.4)
IMM GRANULOCYTES NFR BLD: 0.6 %
KETONES UR STRIP-MCNC: NEGATIVE MG/DL
LACTATE BLD-SCNC: 1.8 MMOL/L (ref 0.7–2)
LEUKOCYTE ESTERASE UR QL STRIP: ABNORMAL
LIPASE SERPL-CCNC: 316 U/L (ref 73–393)
LYMPHOCYTES # BLD AUTO: 2.4 10E9/L (ref 0.8–5.3)
LYMPHOCYTES NFR BLD AUTO: 34.6 %
MCH RBC QN AUTO: 30.6 PG (ref 26.5–33)
MCHC RBC AUTO-ENTMCNC: 32.2 G/DL (ref 31.5–36.5)
MCV RBC AUTO: 95 FL (ref 78–100)
MONOCYTES # BLD AUTO: 0.6 10E9/L (ref 0–1.3)
MONOCYTES NFR BLD AUTO: 8.7 %
NEUTROPHILS # BLD AUTO: 3.7 10E9/L (ref 1.6–8.3)
NEUTROPHILS NFR BLD AUTO: 54 %
NITRATE UR QL: NEGATIVE
NRBC # BLD AUTO: 0 10*3/UL
NRBC BLD AUTO-RTO: 0 /100
PH UR STRIP: 6 PH (ref 5–7)
PLATELET # BLD AUTO: 216 10E9/L (ref 150–450)
POTASSIUM SERPL-SCNC: 4.5 MMOL/L (ref 3.4–5.3)
PROT SERPL-MCNC: 7.3 G/DL (ref 6.8–8.8)
RBC # BLD AUTO: 3.33 10E12/L (ref 3.8–5.2)
RBC #/AREA URNS AUTO: 1 /HPF (ref 0–2)
SODIUM SERPL-SCNC: 136 MMOL/L (ref 133–144)
SOURCE: ABNORMAL
SP GR UR STRIP: 1 (ref 1–1.03)
SQUAMOUS #/AREA URNS AUTO: 4 /HPF (ref 0–1)
TROPONIN I SERPL-MCNC: <0.015 UG/L (ref 0–0.04)
UROBILINOGEN UR STRIP-MCNC: 0 MG/DL (ref 0–2)
WBC # BLD AUTO: 6.9 10E9/L (ref 4–11)
WBC #/AREA URNS AUTO: 5 /HPF (ref 0–5)

## 2018-05-28 PROCEDURE — 84484 ASSAY OF TROPONIN QUANT: CPT | Performed by: EMERGENCY MEDICINE

## 2018-05-28 PROCEDURE — 85025 COMPLETE CBC W/AUTO DIFF WBC: CPT | Performed by: EMERGENCY MEDICINE

## 2018-05-28 PROCEDURE — 96360 HYDRATION IV INFUSION INIT: CPT

## 2018-05-28 PROCEDURE — 83605 ASSAY OF LACTIC ACID: CPT | Performed by: EMERGENCY MEDICINE

## 2018-05-28 PROCEDURE — 99285 EMERGENCY DEPT VISIT HI MDM: CPT | Mod: 25

## 2018-05-28 PROCEDURE — 71046 X-RAY EXAM CHEST 2 VIEWS: CPT

## 2018-05-28 PROCEDURE — 25000128 H RX IP 250 OP 636: Performed by: EMERGENCY MEDICINE

## 2018-05-28 PROCEDURE — 93005 ELECTROCARDIOGRAM TRACING: CPT

## 2018-05-28 PROCEDURE — 81001 URINALYSIS AUTO W/SCOPE: CPT | Performed by: EMERGENCY MEDICINE

## 2018-05-28 PROCEDURE — 83690 ASSAY OF LIPASE: CPT | Performed by: EMERGENCY MEDICINE

## 2018-05-28 PROCEDURE — 96361 HYDRATE IV INFUSION ADD-ON: CPT

## 2018-05-28 PROCEDURE — 80053 COMPREHEN METABOLIC PANEL: CPT | Performed by: EMERGENCY MEDICINE

## 2018-05-28 RX ADMIN — SODIUM CHLORIDE 1000 ML: 9 INJECTION, SOLUTION INTRAVENOUS at 16:30

## 2018-05-28 ASSESSMENT — ENCOUNTER SYMPTOMS
FEVER: 0
LIGHT-HEADEDNESS: 1
APPETITE CHANGE: 0
SHORTNESS OF BREATH: 0
ABDOMINAL PAIN: 0
BLOOD IN STOOL: 0
DYSURIA: 0
DIARRHEA: 0
NAUSEA: 0
VOMITING: 0

## 2018-05-28 NOTE — ED AVS SNAPSHOT
[facility]    [address]    [facility] [address]    Phone:  [phone]    Fax:  [phone]                                       Nena Tang   MRN: [id number]    Department:  [facility]   Date of Visit:  5/28/2018           After Visit Summary Signature Page     I have received my discharge instructions, and my questions have been answered. I have discussed any challenges I see with this plan with the nurse or doctor.    ..........................................................................................................................................  Patient/Patient Representative Signature      ..........................................................................................................................................  Patient Representative Print Name and Relationship to Patient    ..................................................               ................................................  Date                                            Time    ..........................................................................................................................................  Reviewed by Signature/Title    ...................................................              ..............................................  Date                                                            Time

## 2018-05-28 NOTE — ED NOTES
Bed: ED10  Expected date: 5/28/18  Expected time: 4:00 PM  Means of arrival: Ambulance  Comments:  BSV 3

## 2018-05-28 NOTE — ED AVS SNAPSHOT
[facility] Emergency Department    [address]    [facility] [address]    Phone:  [phone]    Fax:  [phone]                                       Nena Tang   MRN: [id number]    Department:  [facility] Emergency Department   Date of Visit:  5/28/2018           Patient Information     Date Of Birth          1960        Your diagnoses for this visit were:     Lightheadedness        You were seen by Tyra Temple MD.      Follow-up Information     Follow up with Rowena Haas APRN CNP In 3 days.    Specialty:  Nurse Practitioner - Gerontology    Contact information:    [address]  [facility] [address]  [phone]          Discharge Instructions       Please follow up closely with your regular physician.     Please return to the ED if your symptoms worsen or if you develop new or concerning symptoms.         Your next 10 appointments already scheduled     Jun 06, 2018 10:45 AM CDT   Ortho Treatment with Kiko Gallego PT   [facility] ([facility])    [address] [place] [facility] [id number] [phone]            Jun 07, 2018  4:15 PM CDT   (Arrive by 4:00 PM)   New Weight Management Visit with Debbie Germain PA-C   [facility] ([facility])    [address] [address] [phone]            Jun 08, 2018  3:00 PM CDT   [facility]    [address]  [address]  [facility] [address] [phone]            Jun 08, 2018  3:30 PM CDT   Return Visit with Kraig Pedersen MD   [facility] ([facility])    [address]  [facility] [address] [phone]            Jun 13, 2018 10:00 AM CDT   Ortho Treatment with Kiko  Julián, CISCO   [facility] ([facility])    [address] [facility] [address]   [phone]            Jun 18, 2018  3:30 PM CDT   RETURN RETINA with Tiburcio Chacon MD   [facility] ([facility])    [address]  [address]  [facility] [address]   [phone]            Jun 20, 2018 10:00 AM CDT   Ortho Treatment with Kiko Gallego PT   [facility] ([facility])    [address] [facility] [address]   [phone]            Jun 22, 2018  1:00 PM CDT   Return Visit with ART Arias CNP   [facility] ([facility])    [address]  [address]  [facility] [address] [phone]            Jun 22, 2018  1:00 PM CDT   [facility]   [facility] ([facility])    [address]  [address]  [facility] [address] [phone]              24 Hour Appointment Hotline       To make an appointment at any [facility], call [phone] ([phone]). If you don't have a family doctor or clinic, we will help you find one. [facility] are conveniently located to serve the needs of you and your family.             Review of your medicines      Our records show that you are taking the medicines listed below. If these are incorrect, please call your family doctor or clinic.        Dose / Directions Last dose taken    * ACETAMINOPHEN PO   Dose:  1000 mg        Take 1,000 mg by mouth every 6 hours as needed for pain or fever   Refills:  0        * acetaminophen 500 MG tablet   Commonly known as:  TYLENOL   Dose:  1000 mg   Quantity:  100 tablet        Take 2 tablets (1,000 mg) by mouth 3 times daily as needed for mild pain   Refills:  0        albuterol 108 (90 Base) MCG/ACT Inhaler    Commonly known as:  PROAIR HFA/PROVENTIL HFA/VENTOLIN HFA   Dose:  2 puff   Quantity:  3 Inhaler        Inhale 2 puffs into the lungs every 6 hours as needed for shortness of breath / dyspnea or wheezing   Refills:  1        aspirin 81 MG EC tablet   Quantity:  28 tablet        TAKE 1 TABLET (81MG) BY MOUTH DAILY   Refills:  3        atorvastatin 80 MG tablet   Commonly known as:  LIPITOR   Quantity:  28 tablet        TAKE 1 TABLET (80MG) BY MOUTH DAILY   Refills:  11        benztropine 0.5 MG tablet   Commonly known as:  COGENTIN   Dose:  0.5 mg   Quantity:  60 tablet        Take 1 tablet (0.5 mg) by mouth 2 times daily   Refills:  1        blood glucose monitoring lancets   Quantity:  150 each        Use to test blood sugar 2 times daily or as directed.  Ok to substitute alternative if insurance prefers.   Refills:  11        blood glucose monitoring test strip   Commonly known as:  ONETOUCH VERIO IQ   Quantity:  200 strip        Use to test blood sugar 4 times daily .  Ok to substitute alternative if insurance prefers.   Refills:  11        calcium carbonate 1500 (600 Ca) MG tablet   Commonly known as:  OS-ALLEN 600 mg [place]. Ca   Dose:  1500 mg   Quantity:  180 tablet        Take 1 tablet (1,500 mg) by mouth daily   Refills:  3        clotrimazole 1 % cream   Commonly known as:  LOTRIMIN        Apply topically 2 times daily   Refills:  0        DIPHENDRYL PO   Dose:  25 mg        Take 25 mg by mouth every 6 hours as needed for itching   Refills:  0        doxycycline 100 MG capsule   Commonly known as:  VIBRAMYCIN   Dose:  100 mg   Quantity:  14 capsule        Take 1 capsule (100 mg) by mouth 2 times daily   Refills:  0        gabapentin 300 MG capsule   Commonly known as:  NEURONTIN   Quantity:  168 capsule        TAKE 2 CAPSULES (600MG) BY MOUTH THREE TIMES A DAY   Refills:  3        glucose 4 g Chew chewable tablet   Quantity:  20 tablet        Take 2 every 15 minutes for blood sugar <70mg/dL. Recheck blood  sugar every 15 minutes until above 70mg/dL, then eat a substantial meal.   Refills:  1        ibuprofen 600 MG tablet   Commonly known as:  ADVIL/MOTRIN   Dose:  600 mg   Quantity:  60 tablet        Take 1 tablet (600 mg) by mouth every 4 hours as needed for moderate pain   Refills:  0        insulin aspart 100 UNIT/ML injection   Commonly known as:  NovoLOG FLEXPEN   Dose:  20 Units   Quantity:  15 mL        Inject 20 Units Subcutaneous 3 times daily (with meals) Once daily, can add additional 5 units if BGs are >500mg/dL.   Refills:  11        insulin glargine 100 UNIT/ML injection   Commonly known as:  LANTUS   Dose:  48 Units   Quantity:  3 mL        Inject 48 Units Subcutaneous At Bedtime   Refills:  11        insulin pen needle 30G X 8 MM   Commonly known as:  NOVOFINE 30   Quantity:  400 each        USE 4 DAILY OR AS DIRECTED   Refills:  0        losartan 100 MG tablet   Commonly known as:  COZAAR   Quantity:  28 tablet        TAKE 1 TABLET (100MG) BY MOUTH DAILY   Refills:  3        melatonin 5 MG Caps   Dose:  2 capsule   Quantity:  180 capsule        Take 2 capsules by mouth daily At dinnertime   Refills:  3        metoprolol succinate 50 MG 24 hr tablet   Commonly known as:  TOPROL-XL   Dose:  50 mg   Quantity:  60 tablet        Take 1 tablet (50 mg) by mouth daily   Refills:  3        * order for DME   Quantity:  1 each        Hold Novolog if meals are refused.   Refills:  0        * order for DME   Quantity:  2 each        Diabetic Shoes   Refills:  0        order for DME   Quantity:  1 each        Equipment being ordered: 4 Wheeled walker with seat and brakes.   Refills:  0        paliperidone 9 MG 24 hr tablet   Commonly known as:  INVEGA   Dose:  9 mg   Quantity:  30 tablet        Take 1 tablet (9 mg) by mouth every morning   Refills:  0        polyethylene glycol powder   Commonly known as:  MIRALAX/GLYCOLAX   Quantity:  527 g        TAKE 8.5 GRAMS (1/2 CAPFUL) BY MOUTH DAILY   Refills:  3         prochlorperazine 5 MG tablet   Commonly known as:  COMPAZINE   Dose:  5 mg   Quantity:  90 tablet        Take 1 tablet (5 mg) by mouth every 6 hours as needed for nausea or vomiting   Refills:  0        ranitidine 300 MG tablet   Commonly known as:  ZANTAC   Quantity:  90 tablet        TAKE 1 TABLET (300MG) BY MOUTH AT BEDTIME   Refills:  1        senna-docusate 8.6-50 MG per tablet   Commonly known as:  SENOKOT-S;PERICOLACE   Dose:  1 tablet        Take 1 tablet by mouth 2 times daily as needed for constipation   Refills:  0        sertraline 100 MG tablet   Commonly known as:  ZOLOFT   Dose:  150 mg   Quantity:  60 tablet        Take 1.5 tablets (150 mg) by mouth daily   Refills:  1        SUMAtriptan 25 MG tablet   Commonly known as:  IMITREX   Dose:  25-50 mg   Quantity:  12 tablet        Take 1-2 tablets (25-50 mg) by mouth at onset of headache for migraine May repeat in 2 hours. Max 8 tablets/24 hours.   Refills:  1        TRULICITY 1.5 MG/0.5ML pen   Quantity:  2 mL   Generic drug:  dulaglutide        INJECT 1.5MG SUBCUTANEOUSLY EVERY SEVEN DAYS   Refills:  0        vitamin D3 88608 UNITS capsule   Commonly known as:  CHOLECALCIFEROL   Quantity:  12 capsule        TAKE 1 CAPSULE (50,000 UNITS) MONTHLY   Refills:  1        * Notice:  This list has 4 medication(s) that are the same as other medications prescribed for you. Read the directions carefully, and ask your doctor or other care provider to review them with you.            Procedures and tests performed during your visit     CBC + differential    Chest XR,  PA & LAT    Comprehensive metabolic panel    EKG 12 lead    Lactic acid whole blood    Lipase    Orthostatic blood pressure and pulse    Troponin I    UA with Microscopic      Orders Needing Specimen Collection     None      Pending Results     Date and Time Order Name Status Description    5/28/2018 1624 EKG 12 lead Preliminary             Pending Culture Results     No orders found from 5/26/2018 to  5/29/2018.            Pending Results Instructions     If you had any lab results that were not finalized at the time of your Discharge, you can call the ED Lab Result RN at [phone]. You will be contacted by this team for any positive Lab results or changes in treatment. The nurses are available 7 days a week from 10A to 6:30P.  You can leave a message 24 hours per day and they will return your call.        Test Results From Your Hospital Stay        5/28/2018  4:41 PM      Component Results     Component Value Ref Range & Units Status    WBC 6.9 4.0 - 11.0 10e9/L Final    RBC Count 3.33 (L) 3.8 - 5.2 10e12/L Final    Hemoglobin 10.2 (L) 11.7 - 15.7 g/dL Final    Hematocrit 31.7 (L) 35.0 - 47.0 % Final    MCV 95 78 - 100 fl Final    MCH 30.6 26.5 - 33.0 pg Final    MCHC 32.2 31.5 - 36.5 g/dL Final    RDW 12.9 10.0 - 15.0 % Final    Platelet Count 216 150 - 450 10e9/L Final    Diff Method Automated Method  Final    % Neutrophils 54.0 % Final    % Lymphocytes 34.6 % Final    % Monocytes 8.7 % Final    % Eosinophils 1.7 % Final    % Basophils 0.4 % Final    % Immature Granulocytes 0.6 % Final    Nucleated RBCs 0 0 /100 Final    Absolute Neutrophil 3.7 1.6 - 8.3 10e9/L Final    Absolute Lymphocytes 2.4 0.8 - 5.3 10e9/L Final    Absolute Monocytes 0.6 0.0 - 1.3 10e9/L Final    Absolute Eosinophils 0.1 0.0 - 0.7 10e9/L Final    Absolute Basophils 0.0 0.0 - 0.2 10e9/L Final    Abs Immature Granulocytes 0.0 0 - 0.4 10e9/L Final    Absolute Nucleated RBC 0.0  Final         5/28/2018  5:02 PM      Component Results     Component Value Ref Range & Units Status    Sodium 136 133 - 144 mmol/L Final    Potassium 4.5 3.4 - 5.3 mmol/L Final    Chloride 104 94 - 109 mmol/L Final    Carbon Dioxide 24 20 - 32 mmol/L Final    Anion Gap 8 3 - 14 mmol/L Final    Glucose 195 (H) 70 - 99 mg/dL Final    Urea Nitrogen 22 7 - 30 mg/dL Final    Creatinine 1.16 (H) 0.52 - 1.04 mg/dL Final    GFR Estimate 48 (L) >60 mL/min/1.7m2 Final     Non  GFR Calc    GFR Estimate If Black 58 (L) >60 mL/min/1.7m2 Final    African American GFR Calc    Calcium 8.6 8.5 - 10.1 mg/dL Final    Bilirubin Total <0.1 (L) 0.2 - 1.3 mg/dL Final    Albumin 3.3 (L) 3.4 - 5.0 g/dL Final    Protein Total 7.3 6.8 - 8.8 g/dL Final    Alkaline Phosphatase 88 40 - 150 U/L Final    ALT 27 0 - 50 U/L Final    AST 18 0 - 45 U/L Final         5/28/2018  5:02 PM      Component Results     Component Value Ref Range & Units Status    Lipase 316 73 - 393 U/L Final         5/28/2018  5:02 PM      Component Results     Component Value Ref Range & Units Status    Troponin I ES <0.015 0.000 - 0.045 ug/L Final    The 99th percentile for upper reference range is 0.045 ug/L.  Troponin values   in the range of 0.045 - 0.120 ug/L may be associated with risks of adverse   clinical events.           5/28/2018  4:41 PM      Component Results     Component Value Ref Range & Units Status    Lactic Acid 1.8 0.7 - 2.0 mmol/L Final         5/28/2018  6:23 PM      Component Results     Component Value Ref Range & Units Status    Color Urine Straw  Final    Appearance Urine Clear  Final    Glucose Urine Negative NEG^Negative mg/dL Final    Bilirubin Urine Negative NEG^Negative Final    Ketones Urine Negative NEG^Negative mg/dL Final    Specific Gravity Urine 1.004 1.003 - 1.035 Final    Blood Urine Negative NEG^Negative Final    pH Urine 6.0 5.0 - 7.0 pH Final    Protein Albumin Urine Negative NEG^Negative mg/dL Final    Urobilinogen mg/dL 0.0 0.0 - 2.0 mg/dL Final    Nitrite Urine Negative NEG^Negative Final    Leukocyte Esterase Urine Moderate (A) NEG^Negative Final    Source Midstream Urine  Final    WBC Urine 5 0 - 5 /HPF Final    RBC Urine 1 0 - 2 /HPF Final    Bacteria Urine Few (A) NEG^Negative /HPF Final    Squamous Epithelial /HPF Urine 4 (H) 0 - 1 /HPF Final    Hyaline Casts 1 0 - 2 /LPF Final         5/28/2018  5:41 PM      Narrative     XR CHEST 2 VW 5/28/2018 5:33 PM    HISTORY:  Short of breath.    COMPARISON: 10/16/2017    FINDINGS: Diffuse interstitial edema. No lobar consolidation. No reyes  pleural effusion or pneumothorax. Stable heart size.        Impression     IMPRESSION: Edema.    DELORES CONCEPCION MD                Clinical Quality Measure: Blood Pressure Screening     Your blood pressure was checked while you were in the emergency department today. The last reading we obtained was  BP: 129/70 . Please read the guidelines below about what these numbers mean and what you should do about them.  If your systolic blood pressure (the top number) is less than 120 and your diastolic blood pressure (the bottom number) is less than 80, then your blood pressure is normal. There is nothing more that you need to do about it.  If your systolic blood pressure (the top number) is 120-139 or your diastolic blood pressure (the bottom number) is 80-89, your blood pressure may be higher than it should be. You should have your blood pressure rechecked within a year by a primary care provider.  If your systolic blood pressure (the top number) is 140 or greater or your diastolic blood pressure (the bottom number) is 90 or greater, you may have high blood pressure. High blood pressure is treatable, but if left untreated over time it can put you at risk for heart attack, stroke, or kidney failure. You should have your blood pressure rechecked by a primary care provider within the next 4 weeks.  If your provider in the emergency department today gave you specific instructions to follow-up with your doctor or provider even sooner than that, you should follow that instruction and not wait for up to 4 weeks for your follow-up visit.        Thank you for choosing [facility]       Thank you for choosing [facility] for your care. Our goal is always to provide you with excellent care. Hearing back from our patients is one way we can continue to improve our services. Please take a few minutes to complete the written  survey that you may receive in the mail after you visit with us. Thank you!        [employer] Information     [employer] gives you secure access to your electronic health record. If you see a primary care provider, you can also send messages to your care team and make appointments. If you have questions, please call your primary care clinic.  If you do not have a primary care provider, please call [phone] and they will assist you.        Care EveryWhere ID     This is your Care EveryWhere ID. This could be used by other organizations to access your [place] medical records  [id number]        Equal Access to Services     [facility] : Azul Rios, luis napier, devante catalan, lorena ellison . So [facility] [phone].    ATENCIÓN: Si habla español, tiene a trinh disposición servicios gratuitos de asistencia lingüística. Smith al [phone].    We comply with applicable federal civil rights laws and [place] laws. We do not discriminate on the basis of race, color, national origin, age, disability, sex, sexual orientation, or gender identity.            After Visit Summary       This is your record. Keep this with you and show to your community pharmacist(s) and doctor(s) at your next visit.

## 2018-05-28 NOTE — ED TRIAGE NOTES
Patient BIBA from her group home after becoming light headed. Patient's blood pressure was taken and was found to be hypotensive, 70's systolic, so EMS was called. Patient has had these episodes in the past, has been given fluids and discharged. Patient states she has been drinking fluids today, and she was outside today in the 90 degree weather, but not for very long. Patient is normotensive here in the ED. A&Ox4. VSS.

## 2018-05-28 NOTE — ED PROVIDER NOTES
"  History     Chief Complaint:  Hypotension    HPI   Nena Tang is a 58 year old female, with history of CAD, hypertension, hyperlipidemia, type II diabetes amongst others as noted below, who presents via EMS to the emergency department for evaluation of hypotension noted by patient's group home. Per patient, patient was feeling lightheaded and staff at group home took patient's blood pressure and it was noted to be 70's systolic, prompting them to call EMS. Per EMS, patient was 80's systolic but is normotensive here. Of note, patient has had these episodes in the past due to dehydration, however patient endorsed that she went out to lunch with friends earlier and was drinking fluids today, but was outside for a little bit in the 90 degree weather. Patient notes that she has been feeling lightheaded \"for hours today\", primarily when she is standing and currently endorses lightheadedness.     She denies any syncope, fever, nausea, vomiting, new diarrhea or shortness of breath, abdominal pain, chest pain, bloody stools, dysuria or appetite change.     Allergies:  Imidazole antifungals  Ketoprofen  Lisinopril  Metformin  Metronidazole  Posaconazole     Medications:    Tylenol  Acetaminophen  Albuterol inhaler  Aspirin  Atorvastatin  Cogentin  Calcium carbonate  Lotrimin  Diphendryl  Vibramycin  Neurontin  Ibuprofen  Novolog flexpen  Lantus  Novofine  Cozaar  Melatonin  Metoprolol succinate  Invega  Miralax/glycolax  Compazine  Zantac  Senna-socusate  Zoloft  Imitrex  Trulicity  Vitamin D3    Past Medical History:    CAD   Chronic low back pain  Cocaine abuse in remission  Fecal urgency  History of heroin abuse  Hyperlipidemia  Hypertension  Illiterate  Irritable bowel syndrome  Left cataract  Migraine  Moderate major depression  Noncompliance with medication regimen  Obesity  CINDY  Osteopenia  Schizoaffective disorder, depressive  Suicidal intent  Takotsubo cardiomyopathy  Type II Diabetes  Uterine Cancer  Verbal " auditory hallucination    Past Surgical History:    Oophorectomy for malig, w/bx - uterine  Cataract IOL  Colonoscopy  Coronary CTA  Hysterectomy   Laparoscopic cholecystectomy  Phacoemulsification clear cornea with standard intraocular lens implant x2  Release trigger finger x2    Family History:    Bladder cancer  COPD  Cirrhosis of liver  Alcohol/drug  Diabetes  Hypertension  Lipids  CAD  Glaucoma  Mental illness  Psychotic disorder  Colorectal cancer  Heroin overdose    Social History:  The patient was accompanied to the ED by EMS.  Smoking Status: No  Smokeless Tobacco: No  Alcohol Use: Yes   Marital Status:   [5]     Review of Systems   Constitutional: Negative for appetite change and fever.        Hypotensive   Respiratory: Negative for shortness of breath.    Cardiovascular: Negative for chest pain.   Gastrointestinal: Negative for abdominal pain, blood in stool, diarrhea, nausea and vomiting.   Genitourinary: Negative for dysuria.   Neurological: Positive for light-headedness. Negative for syncope.   All other systems reviewed and are negative.    Physical Exam   Vitals:  Patient Vitals for the past 24 hrs:   BP Temp Temp src Pulse Heart Rate Resp SpO2 Height Weight   05/28/18 1948 129/70 - - 77 77 16 98 % - -   05/28/18 1900 - - - - - - 94 % - -   05/28/18 1845 - - - - - - 94 % - -   05/28/18 1830 - - - - - - 94 % - -   05/28/18 1815 - - - - - - 93 % - -   05/28/18 1800 - - - - - - 96 % - -   05/28/18 1755 - - - - - - 96 % - -   05/28/18 1754 129/70 - - - - - - - -   05/28/18 1715 113/57 - - - 79 13 95 % - -   05/28/18 1700 - - - - 78 26 93 % - -   05/28/18 1645 - - - - 75 13 93 % - -   05/28/18 1617 128/52 98.3  F (36.8  C) Oral 77 77 16 98 % 1.524 m (5') 104.3 kg (230 lb)      Physical Exam  Constitutional: The patient is oriented to person, place, and time. Alert and cooperative.  HENT:   Right Ear: External ear normal.   Left Ear: External ear normal.   Nose: Nose normal.   Mouth/Throat: Uvula  is midline, oropharynx is clear and moist and mucous membranes are normal. No posterior oropharyngeal edema or erythema.   Eyes: Conjunctivae, EOM and lids are normal. Pupils are equal, round, and reactive to light.   Neck: Trachea normal. Normal range of motion. Neck supple.   Cardiovascular: Normal rate, regular rhythm, normal heart sounds, and intact distal pulses.    Pulmonary/Chest: Effort normal and breath sounds equal bilaterally. No crackles or wheezing.   Abdominal: Soft. No tenderness. No rebound and no guarding.   Musculoskeletal: Normal range of motion.  No extremity tenderness or edema.  Neurological: Alert and Oriented. Strength 5/5 in upper and lower extremities bilaterally. Sensation intact to light touch throughout.  Skin: Skin is dry. No rash noted.        Emergency Department Course     ECG:  ECG taken at 1632, ECG read at 1638 by Dr. Temple  Normal sinus rhythm  Left axis deviation  Septal infarct, age undetermined  Abnormal ECG   No significant change compared to 1/13/18  Rate 74 bpm. WI interval 182. QRS duration 88. QT/QTc 388/430. P-R-T axes 37 -47 -16.     Imaging:  Radiology findings were communicated with the patient who voiced understanding of the findings.  Chest XR, PA & LAT:  IMPRESSION: Edema.  Reading per radiology.     Laboratory:  Laboratory findings were communicated with the patient who voiced understanding of the findings.  CBC: HGB 10.2 (L) o/w WNL (WBC 6.9, )  CMP: Glucose 195 (H), Creatinine 1.16 (H), GFR Estimate if Black 58 (L), Bilirubin Total <0.1 (L), Albumin 3.3 (L) o/w WNL  Lipase: 316  Troponin (Collected 1625): <0.015  Lactic Acid Whole Blood (Collected 1625): 1.8    UA with Microscopic: Leukocyte Esterase Urine Moderate (A), Bacteria Few (A), Squamous Epithelial/HPF Urine 4 (H) o/w WNL     Interventions:  1630 0.9% NaCl Bolus 1000 mL IV     Emergency Department Course:  Nursing notes and vitals reviewed.  IV was inserted and blood was drawn for laboratory  testing, results above.  The patient provided a urine sample here in the emergency department. This was sent for laboratory testing, findings above.   EKG obtained in the ED, see results above.    The patient was sent for a Chest XR, PA & LAT while in the emergency department, results above.      4:16 PM: I performed an exam of the patient as documented above. History was obtained from patient.  7:29 PM: Updated patient pertaining results. Patient will be discharged home.     I discussed the treatment plan with the patient. They expressed understanding of this plan and consented to discharge. They will be discharged home with instructions for care and follow up. In addition, the patient will return to the emergency department if their symptoms persist, worsen, if new symptoms arise or if there is any concern.  All questions were answered.      I personally reviewed the laboratory results with the Patient and answered all related questions prior to discharge.    Impression & Plan      Medical Decision Making:  Nena Tang is a 58 year old female, with history of CAD, hypertension, hyperlipidemia, type II diabetes who presents via EMS to the emergency department for evaluation of hypotension noted by patient's group home.  Upon presentation in the ED, the patient is nontoxic-appearing and vitals are within normal limits and stable.  On exam, she is well-appearing.  She is alert, oriented, and neurologic exam is nonfocal.  Cardiopulmonary exam is unremarkable.  Abdomen is soft nontender throughout.  She has full range of motion of all extremities without pain.  She has no lower extremity edema.  The rest of her exam is as mentioned above.     EKG was obtained and demonstrates sinus rhythm.  There are no concerning acute ischemic changes.  Labs were obtained and are as mentioned above.  Notably, she does not have a leukocytosis.  Creatinine is very mildly increased from baseline, but not significantly.  Troponin  and lactate are within normal limits.  Chest x-ray was obtained and demonstrates mild diffuse interstitial edema.  There is no lobar consolidation, reyes effusion, or pneumothorax.  The heart size appears stable. The patient denies any significant lower extremity edema or shortness of breath that is changed from baseline.  She is in no acute respiratory distress and not requiring supplemental oxygen.  Orthostatics are within normal limits.  The patient notes that she has had several episodes like this in the past in which she has felt lightheaded and had a low blood pressure.  After evaluation in the emergency department and IV fluids, symptoms improved. On my repeat evaluation after IV fluid, the patient does note resolution of her symptoms.  She was given a trial of ambulation and tolerated this well without difficulty.  The patient has no findings on exam to suggest an underlying infectious process.  Given the unremarkable EKG and negative troponin, ACS is unlikely.  Although there is mild edema noted on chest x-ray, she does not clinically appear volume overloaded.  She is in no acute respiratory distress and has a normal oxygen saturation on room air. She denies shortness of breath. Given that she is well-appearing, with resolved symptoms, had no episodes of hypotension while in the emergency department, and had a relatively unremarkable workup, I do feel she is safe for discharge to home.  I did discuss with the patient that I recommend close follow-up with a primary care physician and she notes understanding and agreement with this plan.  Return instructions given.  She was stable/improved at time of discharge.    Diagnosis:    ICD-10-CM    1. Lightheadedness R42         Disposition:   Discharged.     Scribe Disclosure:  Christin HUSSEIN, am serving as a scribe at 4:16 PM on 5/28/2018 to document services personally performed by Tyra Temple MD, based on my observations and the provider's statements to  me.  5/28/2018   [facility] EMERGENCY DEPARTMENT       Tyra Temple MD  05/29/18 0036

## 2018-05-29 LAB
FINAL DIAGNOSIS: NORMAL
HPV HR 12 DNA CVX QL NAA+PROBE: NEGATIVE
HPV16 DNA SPEC QL NAA+PROBE: NEGATIVE
HPV18 DNA SPEC QL NAA+PROBE: NEGATIVE
INTERPRETATION ECG - MUSE: NORMAL
SPECIMEN DESCRIPTION: NORMAL
SPECIMEN SOURCE CVX/VAG CYTO: NORMAL

## 2018-06-01 PROBLEM — Z12.4 CERVICAL CANCER SCREENING: Status: ACTIVE | Noted: 2018-06-01

## 2018-06-04 ENCOUNTER — OFFICE VISIT (OUTPATIENT)
Dept: FAMILY MEDICINE | Facility: CLINIC | Age: 58
End: 2018-06-04
Payer: MEDICARE

## 2018-06-04 VITALS
WEIGHT: 235 LBS | HEART RATE: 80 BPM | RESPIRATION RATE: 18 BRPM | TEMPERATURE: 98.3 F | SYSTOLIC BLOOD PRESSURE: 94 MMHG | DIASTOLIC BLOOD PRESSURE: 58 MMHG | OXYGEN SATURATION: 93 % | BODY MASS INDEX: 45.9 KG/M2

## 2018-06-04 DIAGNOSIS — E11.22 TYPE 2 DIABETES MELLITUS WITH STAGE 3 CHRONIC KIDNEY DISEASE, WITH LONG-TERM CURRENT USE OF INSULIN (H): ICD-10-CM

## 2018-06-04 DIAGNOSIS — N18.30 TYPE 2 DIABETES MELLITUS WITH STAGE 3 CHRONIC KIDNEY DISEASE, WITH LONG-TERM CURRENT USE OF INSULIN (H): ICD-10-CM

## 2018-06-04 DIAGNOSIS — I25.119 CORONARY ARTERY DISEASE INVOLVING NATIVE HEART WITH ANGINA PECTORIS, UNSPECIFIED VESSEL OR LESION TYPE (H): ICD-10-CM

## 2018-06-04 DIAGNOSIS — Z79.4 TYPE 2 DIABETES MELLITUS WITH STAGE 3 CHRONIC KIDNEY DISEASE, WITH LONG-TERM CURRENT USE OF INSULIN (H): ICD-10-CM

## 2018-06-04 DIAGNOSIS — F25.1 SCHIZOAFFECTIVE DISORDER, DEPRESSIVE TYPE (H): Primary | ICD-10-CM

## 2018-06-04 PROCEDURE — 99214 OFFICE O/P EST MOD 30 MIN: CPT | Performed by: NURSE PRACTITIONER

## 2018-06-04 RX ORDER — METOPROLOL SUCCINATE 25 MG/1
25 TABLET, EXTENDED RELEASE ORAL DAILY
Qty: 30 TABLET | Refills: 1 | Status: SHIPPED | OUTPATIENT
Start: 2018-06-04 | End: 2018-08-01

## 2018-06-04 NOTE — MR AVS SNAPSHOT
After Visit Summary   6/4/2018    Nena Tang    MRN: [id number]           Patient Information     Date Of Birth          1960        Visit Information        Provider Department      6/4/2018 11:00 AM Rowena Haas APRN CNP [facility]        Today's Diagnoses     Coronary artery disease involving native heart with angina pectoris, unspecified vessel or lesion type (H)          Care Instructions    -Remember to use CPAP at night and keep night light on.     -Reduced Metoprolol dose to 25 mg daily.     -Drink plenty of fluids; water, or low carb powerade/gatorade when it's warm outside.           Follow-ups after your visit        Your next 10 appointments already scheduled     Jun 06, 2018 10:45 AM CDT   Ortho Treatment with Kiko Gallego PT   [facility] Physical Therapy ([facility])    [address] [address]   [phone]            Jun 07, 2018  4:15 PM CDT   (Arrive by 4:00 PM)   New Weight Management Visit with Debbie Germain PA-C   [facility] Management ([facility])    [address]  4th Floor  [facility] [address]   [phone]            Jun 08, 2018  3:00 PM CDT   Return Visit with BIN Mondragon   [facility] ([facility])    [address]  [address]  [facility] [address]   [phone]            Jun 08, 2018  3:30 PM CDT   Return Visit with Kraig Pedersen MD   [facility] ([facility])    [address]  [facility] [address]   [phone]            Jun 13, 2018 10:00 AM CDT   Ortho Treatment with Kiko Gallego PT   [facility] Therapy ([facility])    [address] [address]   [phone]            Jun 18, 2018  3:30 PM CDT   RETURN RETINA with  Tiburcio Chacon MD   [facility] ([facility]    [facility]  [address] [place] [address]  [address]  [facility] [address]   [phone]            Jun 20, 2018 10:00 AM CDT   Ortho Treatment with Kiko Gallego PT   [facility] ([facility])    [address]  [facility] [address]   [phone]            Jun 22, 2018  1:00 PM CDT   Return Visit with ART Arias CNP   [facility] ([facility])    [address]  [address]  [facility] [id number] [phone]            Jun 22, 2018  1:00 PM CDT   Return Visit with BIN Mondragon   [facility] ([facility])    [address]  [address]  [facility] [address] [phone]              Who to contact     If you have questions or need follow up information about today's clinic visit or your schedule please contact [facility] directly at [phone].  Normal or non-critical lab and imaging results will be communicated to you by MyChart, letter or phone within 4 business days after the clinic has received the results. If you do not hear from us within 7 days, please contact the clinic through [employer] or phone. If you have a critical or abnormal lab result, we will notify you by phone as soon as possible.  Submit refill requests through [employer] or call your pharmacy and they will forward the refill request to us. Please allow 3 business days for your refill to be completed.          Additional Information About Your Visit        [employer] Information     [employer] gives you secure access to your electronic health record. If you see a primary care provider, you can also send messages to your care team and make appointments. If you have questions, please call your primary care clinic.  If you do not have a primary  care provider, please call [phone] and they will assist you.        Care EveryWhere ID     This is your Care EveryWhere ID. This could be used by other organizations to access your [facility] medical records  [id number]        Your Vitals Were     Pulse Temperature Respirations Last Period Pulse Oximetry BMI (Body Mass Index)    80 98.3  F (36.8  C) (Oral) 18 01/06/2015 93% 45.9 kg/m2       Blood Pressure from Last 3 Encounters:   06/04/18 94/58   05/28/18 129/70   05/22/18 108/74    Weight from Last 3 Encounters:   06/04/18 235 lb (106.6 kg)   05/28/18 230 lb (104.3 kg)   05/22/18 236 lb (107 kg)              Today, you had the following     No orders found for display         Today's Medication Changes          These changes are accurate as of 6/4/18 11:30 AM.  If you have any questions, ask your nurse or doctor.               These medicines have changed or have updated prescriptions.        Dose/Directions    metoprolol succinate 25 MG 24 hr tablet   Commonly known as:  TOPROL-XL   This may have changed:    - medication strength  - how much to take   Used for:  Coronary artery disease involving native heart with angina pectoris, unspecified vessel or lesion type (H)   Changed by:  Rowena Haas APRN CNP        Dose:  25 mg   Take 1 tablet (25 mg) by mouth daily   Quantity:  30 tablet   Refills:  1            Where to get your medicines      These medications were sent to [address] [address]     Phone:  [phone]     metoprolol succinate 25 MG 24 hr tablet                Primary Care Provider Office Phone # Fax #    ART Arias [phone]       [address] [place], [place] [address] [address]        Goals        General    Medical (pt-stated)     Notes - Note created  7/7/2016  9:15 AM by Chelsea Pulido, RN    Get blood sugars under control    Improve medication  compliance    As of today's date 7/7/2016 goal is met at 0 - 25%.   Goal Status:  Active          Equal Access to Services     EMAKIMBERLY HEMA : Hadii samira shane tonny Rios, shamikada sangeethachrissy, devante catalan, lorena johnson laLuzfernando martins. So [facility] [phone].    ATENCIÓN: Si habla español, tiene a trinh disposición servicios gratuitos de asistencia lingüística. Fatouame al [phone].    We comply with applicable federal civil rights laws and [place] laws. We do not discriminate on the basis of race, color, national origin, age, disability, sex, sexual orientation, or gender identity.            Thank you!     Thank you for choosing [facility]  for your care. Our goal is always to provide you with excellent care. Hearing back from our patients is one way we can continue to improve our services. Please take a few minutes to complete the written survey that you may receive in the mail after your visit with us. Thank you!             Your Updated Medication List - Protect others around you: Learn how to safely use, store and throw away your medicines at www.disposemymeds.org.          This list is accurate as of 6/4/18 11:30 AM.  Always use your most recent med list.                   Brand Name Dispense Instructions for use Diagnosis    * ACETAMINOPHEN PO      Take 1,000 mg by mouth every 6 hours as needed for pain or fever        * acetaminophen 500 MG tablet    TYLENOL    100 tablet    Take 2 tablets (1,000 mg) by mouth 3 times daily as needed for mild pain    Chronic low back pain, unspecified back pain laterality, with sciatica presence unspecified       albuterol 108 (90 Base) MCG/ACT Inhaler    PROAIR HFA/PROVENTIL HFA/VENTOLIN HFA    3 Inhaler    Inhale 2 puffs into the lungs every 6 hours as needed for shortness of breath / dyspnea or wheezing    Dyspnea on exertion       aspirin 81 MG EC tablet     28 tablet    TAKE 1 TABLET (81MG) BY MOUTH DAILY    Coronary  artery disease involving native heart with angina pectoris, unspecified vessel or lesion type (H)       atorvastatin 80 MG tablet    LIPITOR    28 tablet    TAKE 1 TABLET (80MG) BY MOUTH DAILY    Hyperlipidemia LDL goal <100       benztropine 0.5 MG tablet    COGENTIN    60 tablet    Take 1 tablet (0.5 mg) by mouth 2 times daily    Extrapyramidal and movement disorder       blood glucose monitoring lancets     150 each    Use to test blood sugar 2 times daily or as directed.  Ok to substitute alternative if insurance prefers.    Type 2 diabetes mellitus with diabetic neuropathy, with long-term current use of insulin (H)       blood glucose monitoring test strip    ONETOUCH VERIO IQ    200 strip    Use to test blood sugar 4 times daily .  Ok to substitute alternative if insurance prefers.    Type 2 diabetes mellitus with diabetic neuropathy, with long-term current use of insulin (H)       calcium carbonate 1500 (600 Ca) MG tablet    OS-ALLEN 600 mg [place]. Ca    180 tablet    Take 1 tablet (1,500 mg) by mouth daily    Other osteoporosis without current pathological fracture       clotrimazole 1 % cream    LOTRIMIN     Apply topically 2 times daily        DIPHENDRYL PO      Take 25 mg by mouth every 6 hours as needed for itching        gabapentin 300 MG capsule    NEURONTIN    168 capsule    TAKE 2 CAPSULES (600MG) BY MOUTH THREE TIMES A DAY    Type 2 diabetes mellitus with diabetic neuropathy, with long-term current use of insulin (H)       glucose 4 g Chew chewable tablet     20 tablet    Take 2 every 15 minutes for blood sugar <70mg/dL. Recheck blood sugar every 15 minutes until above 70mg/dL, then eat a substantial meal.    Type 2 diabetes mellitus with mild nonproliferative retinopathy without macular edema, with long-term current use of insulin, unspecified laterality (H)       ibuprofen 600 MG tablet    ADVIL/MOTRIN    60 tablet    Take 1 tablet (600 mg) by mouth every 4 hours as needed for moderate pain    Closed  fracture of one rib of right side, initial encounter       insulin aspart 100 UNIT/ML injection    NovoLOG FLEXPEN    15 mL    Inject 20 Units Subcutaneous 3 times daily (with meals) Once daily, can add additional 5 units if BGs are >500mg/dL.    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, with long-term current use of insulin, unspecified laterality (H)       insulin pen needle 30G X 8 MM    NOVOFINE 30    400 each    USE 4 DAILY OR AS DIRECTED    Type 2 diabetes mellitus with mild nonproliferative retinopathy without macular edema, with long-term current use of insulin, unspecified laterality (H)       losartan 100 MG tablet    COZAAR    28 tablet    TAKE 1 TABLET (100MG) BY MOUTH DAILY    Coronary artery disease involving native heart with angina pectoris, unspecified vessel or lesion type (H)       melatonin 5 MG Caps     180 capsule    Take 2 capsules by mouth daily At dinnertime    Insomnia, unspecified type       metoprolol succinate 25 MG 24 hr tablet    TOPROL-XL    30 tablet    Take 1 tablet (25 mg) by mouth daily    Coronary artery disease involving native heart with angina pectoris, unspecified vessel or lesion type (H)       * order for DME     1 each    Hold Novolog if meals are refused.    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    Generalized muscle weakness       paliperidone 9 MG 24 hr tablet    INVEGA    30 tablet    Take 1 tablet (9 mg) by mouth every morning    Schizoaffective disorder, bipolar type (H)       polyethylene glycol powder    MIRALAX/GLYCOLAX    527 g    TAKE 8.5 GRAMS (1/2 CAPFUL) BY MOUTH DAILY    Constipation, unspecified constipation type       prochlorperazine 5 MG tablet    COMPAZINE    90 tablet    Take 1 tablet (5 mg) by mouth every 6 hours as needed for nausea or vomiting    Nausea       ranitidine 300 MG tablet    ZANTAC    90 tablet    TAKE 1 TABLET (300MG) BY MOUTH AT BEDTIME    Gastroesophageal reflux disease without esophagitis       senna-docusate 8.6-50 MG per tablet    SENOKOT-S;PERICOLACE     Take 1 tablet by mouth 2 times daily as needed for constipation        sertraline 100 MG tablet    ZOLOFT    60 tablet    Take 1.5 tablets (150 mg) by mouth daily    Schizoaffective disorder, bipolar type (H)       SUMAtriptan 25 MG tablet    IMITREX    12 tablet    Take 1-2 tablets (25-50 mg) by mouth at onset of headache for migraine May repeat in 2 hours. Max 8 tablets/24 hours.    Migraine with aura and without status migrainosus, not intractable       TRULICITY 1.5 MG/0.5ML pen   Generic drug:  dulaglutide     2 mL    INJECT 1.5MG SUBCUTANEOUSLY EVERY SEVEN DAYS    Type 2 diabetes mellitus with mild nonproliferative retinopathy without macular edema, with long-term current use of insulin, unspecified laterality (H)       vitamin D3 12814 UNITS capsule    CHOLECALCIFEROL    12 capsule    TAKE 1 CAPSULE (50,000 UNITS) MONTHLY    Vitamin D deficiency       * Notice:  This list has 4 medication(s) that are the same as other medications prescribed for you. Read the directions carefully, and ask your doctor or other care provider to review them with you.

## 2018-06-04 NOTE — PATIENT INSTRUCTIONS
-Remember to use CPAP at night and keep night light on.     -Reduced Metoprolol dose to 25 mg daily.     -Drink plenty of fluids; water, or low carb powerade/gatorade when it's warm outside.

## 2018-06-04 NOTE — PROGRESS NOTES
"SUBJECTIVE:                                                    Nena Tang is a 58 year old  female who presents to clinic today for the following health issues:    ED/UC Followup:  Facility:  [facility] Emergency Department  Date of visit: 5/28/18  Reason for visit: Lightheadedness, Hypotension  Current Status: a little lightheadedness     Patient states that she is working on increasing her fluid intake/hydration to help with her hypotension/lightheadedness. Patient states that she felt better after receiving the IVF at the ED. Discussed that the same feeling would continue when she drinks plenty of water during the day.   Discussed cutting back further on her BP medication to help with some of the symptoms; patient is agreeable to this plan.        Mental Health Follow up     Patient reports that she has been feeling down; worrying about her sister, and also moving to a new apartment.  Patient states that she was feeling unhappy and some suicidal thoughts a few days ago. Denies any of the thoughts today. Excited to soon having her own place where she can have her grand kids over.     Status since last visit: some improvement.     See PHQ-9 for current symptoms.  Other associated symptoms: None    Complicating factors: none.   Significant life event:  No   Current substance abuse:  None  Anxiety or Panic symptoms:  No    Sleep - not using her CPAP. Feels like she feels like the \"man\" is trying to suffocate when she has her CPAP on. Tries to keep night light on.    Appetite - good, three meals per day. Brings lunch to drop in center. Discussed drinking more water.   Exercise - walking, PT twice per week.     Patient is scheduled to go the weight clinic this week to help with her weight loss.     Smoking - no  Alcohol - no  Street drugs - no  Marijuana - no  Caffeine - no    PHQ-9  English PHQ-9   Any Language           Diabetes Follow-up  Patient is checking blood sugars: three " times daily.   Blood sugar testing frequency justification: Frequent hypoglycemia and Patient modifying lifestyle changes (diet, exercise) with blood sugars  Results are as follows:         am - 101         lunchtime - 163         suppertime - 178  Blood sugar average ranges: ;     Diabetic concerns: None     Symptoms of hypoglycemia (low blood sugar): dizziness     Paresthesias (numbness or burning in feet) or sores: Yes, very minimal.      Date of last diabetic eye exam: scheduled.     BP Readings from Last 2 Encounters:   06/04/18 94/58   05/28/18 129/70     Hemoglobin A1C (%)   Date Value   05/22/2018 6.2 (H)   02/12/2018 6.8 (H)     LDL Cholesterol Calculated (mg/dL)   Date Value   06/27/2017 64   07/14/2015 78     LDL Cholesterol Direct (mg/dL)   Date Value   07/13/2016 137 (H)         Problems taking medications regularly: No    Medication side effects: none    Diet: regular (no restrictions)    Social History   Substance Use Topics     Smoking status: Never Smoker     Smokeless tobacco: Never Used     Alcohol use No      Comment: last month        Problem list and histories reviewed & adjusted, as indicated.  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 (H)     Illiterate     Irritable bowel syndrome     overweight - BMI >35     Takotsubo cardiomyopathy     CAD (coronary artery disease)     Restless legs syndrome (RLS)     CINDY (obstructive sleep apnea)- mild AHI 10.3     Verbal auditory hallucination     Chronic low back pain     Schizoaffective disorder, depressive type (H)     Migraine headache     HTN, goal below 140/90     Health Care Home     Lumbago     Cervicalgia     Cocaine abuse, episodic     Suicidal ideation     Esophageal reflux     Mild nonproliferative diabetic  retinopathy (H)     Tardive dyskinesia     Alcohol use     Left cataract     Falls frequently     History of uterine cancer     Psychophysiological insomnia     Dysuria     Asymptomatic postmenopausal status     Abdominal pain, right lower quadrant     Sepsis (H)     Pneumonia of right lower lobe due to infectious organism (H)     Infectious encephalopathy     Non-intractable vomiting with nausea     Acute respiratory failure with hypoxia (H)     Thoughts of self harm     Gastroenteritis     Posttraumatic stress disorder     Cervical cancer screening     Past Surgical History:   Procedure Laterality Date     C OOPHORECTORMY FOR MALIG, W/BX  1983    UTERINE     CATARACT IOL, RT/LT Bilateral 2017     COLONOSCOPY N/A 3/16/2017    Procedure: COLONOSCOPY;  Surgeon: Traci Gonzalez MD;  Location:  GI     Coronary CTA  5/21/2014     HYSTERECTOMY  1983    uterine cancer yearly pap's per provider.     LAPAROSCOPIC CHOLECYSTECTOMY  2008     PHACOEMULSIFICATION CLEAR CORNEA WITH STANDARD INTRAOCULAR LENS IMPLANT Left 5/5/2017    Procedure: PHACOEMULSIFICATION CLEAR CORNEA WITH STANDARD INTRAOCULAR LENS IMPLANT;  LEFT EYE PHACOEMULSIFICATION CLEAR CORNEA WITH STANDARD INTRAOCULAR LENS IMPLANT ;  Surgeon: Tyra Diaz MD;  Location: [facility]     PHACOEMULSIFICATION CLEAR CORNEA WITH STANDARD INTRAOCULAR LENS IMPLANT Right 6/30/2017    Procedure: PHACOEMULSIFICATION CLEAR CORNEA WITH STANDARD INTRAOCULAR LENS IMPLANT;  RIGHT EYE PHACOEMULSIFICATION CLEAR CORNEA WITH STANDARD INTRAOCULAR LENS IMPLANT;  Surgeon: Tyra Diaz MD;  Location:  [facility]     RELEASE TRIGGER FINGER  10/11/2012    Left thumb. Procedure: RELEASE TRIGGER FINGER;  LEFT THUMB TRIGGER RELEASE;  Surgeon: Tay Langley MD;  Location:  [facility]     RELEASE TRIGGER FINGER Right 12/26/2016    Procedure: RELEASE TRIGGER FINGER;  Surgeon: Albino Castañeda MD;  Location:  OR       Social History   Substance Use Topics     Smoking status: Never  Smoker     Smokeless tobacco: Never Used     Alcohol use No      Comment: last month     Family History   Problem Relation Age of Onset     CANCER Mother      BLADDER     Respiratory Mother      COPD     GASTROINTESTINAL DISEASE Mother      CIRRHOSIS OF LI BOLIVAR     Alcohol/Drug Mother      DIABETES Mother      Hypertension Mother      Lipids Mother      C.A.D. Mother      Glaucoma Mother      Alcohol/Drug Sister      MENTAL ILLNESS Sister      Alcohol/Drug Sister      Psychotic Disorder Sister      CANCER Maternal Grandmother      UNKNOWN TYPE     CANCER Brother      COLON     Cancer - colorectal Brother      IN HIS LATE 30S     Alcohol/Drug Brother       OF HEROIN OVERDOSE AT AGE 22 YRS     Macular Degeneration No family hx of            Current Outpatient Prescriptions   Medication Sig Dispense Refill     acetaminophen (TYLENOL) 500 MG tablet Take 2 tablets (1,000 mg) by mouth 3 times daily as needed for mild pain 100 tablet 0     ACETAMINOPHEN PO Take 1,000 mg by mouth every 6 hours as needed for pain or fever       albuterol (PROAIR HFA/PROVENTIL HFA/VENTOLIN HFA) 108 (90 BASE) MCG/ACT Inhaler Inhale 2 puffs into the lungs every 6 hours as needed for shortness of breath / dyspnea or wheezing 3 Inhaler 1     aspirin 81 MG EC tablet TAKE 1 TABLET (81MG) BY MOUTH DAILY 28 tablet 3     atorvastatin (LIPITOR) 80 MG tablet TAKE 1 TABLET (80MG) BY MOUTH DAILY 28 tablet 11     benztropine (COGENTIN) 0.5 MG tablet Take 1 tablet (0.5 mg) by mouth 2 times daily 60 tablet 1     blood glucose monitoring (ONE TOUCH DELICA) lancets Use to test blood sugar 2 times daily or as directed.  Ok to substitute alternative if insurance prefers. 150 each 11     blood glucose monitoring (ONETOUCH VERIO IQ) test strip Use to test blood sugar 4 times daily .  Ok to substitute alternative if insurance prefers. 200 strip 11     calcium carbonate (OS-ALLEN 600 MG [place]. CA) 1500 (600 CA) MG tablet Take 1 tablet (1,500 mg) by mouth daily 180  tablet 3     clotrimazole (LOTRIMIN) 1 % cream Apply topically 2 times daily       DiphenhydrAMINE HCl (DIPHENDRYL PO) Take 25 mg by mouth every 6 hours as needed for itching       doxycycline (VIBRAMYCIN) 100 MG capsule Take 1 capsule (100 mg) by mouth 2 times daily 14 capsule 0     gabapentin (NEURONTIN) 300 MG capsule TAKE 2 CAPSULES (600MG) BY MOUTH THREE TIMES A  capsule 3     glucose 4 G CHEW chewable tablet Take 2 every 15 minutes for blood sugar <70mg/dL. Recheck blood sugar every 15 minutes until above 70mg/dL, then eat a substantial meal. 20 tablet 1     ibuprofen (ADVIL,MOTRIN) 600 MG tablet Take 1 tablet (600 mg) by mouth every 4 hours as needed for moderate pain 60 tablet 0     insulin aspart (NOVOLOG FLEXPEN) 100 UNIT/ML injection Inject 20 Units Subcutaneous 3 times daily (with meals) Once daily, can add additional 5 units if BGs are >500mg/dL. 15 mL 11     insulin glargine (LANTUS) 100 UNIT/ML injection Inject 48 Units Subcutaneous At Bedtime 3 mL 11     insulin pen needle (NOVOFINE 30) 30G X 8 MM USE 4 DAILY OR AS DIRECTED 400 each 0     losartan (COZAAR) 100 MG tablet TAKE 1 TABLET (100MG) BY MOUTH DAILY 28 tablet 3     melatonin 5 MG CAPS Take 2 capsules by mouth daily At dinnertime 180 capsule 3     metoprolol succinate (TOPROL-XL) 50 MG 24 hr tablet Take 1 tablet (50 mg) by mouth daily 60 tablet 3     order for DME Equipment being ordered: 4 Wheeled walker with seat and brakes. 1 each 0     order for DME Diabetic Shoes 2 each 0     order for DME Hold Novolog if meals are refused. 1 each 0     paliperidone (INVEGA) 9 MG 24 hr tablet Take 1 tablet (9 mg) by mouth every morning 30 tablet 0     polyethylene glycol (MIRALAX/GLYCOLAX) powder TAKE 8.5 GRAMS (1/2 CAPFUL) BY MOUTH DAILY 527 g 3     prochlorperazine (COMPAZINE) 5 MG tablet Take 1 tablet (5 mg) by mouth every 6 hours as needed for nausea or vomiting 90 tablet 0     ranitidine (ZANTAC) 300 MG tablet TAKE 1 TABLET (300MG) BY MOUTH AT  BEDTIME 90 tablet 1     senna-docusate (SENOKOT-S;PERICOLACE) 8.6-50 MG per tablet Take 1 tablet by mouth 2 times daily as needed for constipation       sertraline (ZOLOFT) 100 MG tablet Take 1.5 tablets (150 mg) by mouth daily 60 tablet 1     SUMAtriptan (IMITREX) 25 MG tablet Take 1-2 tablets (25-50 mg) by mouth at onset of headache for migraine May repeat in 2 hours. Max 8 tablets/24 hours. 12 tablet 1     TRULICITY 1.5 MG/0.5ML pen INJECT 1.5MG SUBCUTANEOUSLY EVERY SEVEN DAYS 2 mL 0     vitamin D3 (CHOLECALCIFEROL) 28214 UNITS capsule TAKE 1 CAPSULE (50,000 UNITS) MONTHLY 12 capsule 1     Allergies   Allergen Reactions     Imidazole Antifungals Hives     Tolerates diflucan     Ketoprofen Itching     Pruritis to topical     Lisinopril Hives     Metformin Other (See Comments)     Patient hospitalized for lactic acidosis - admitting provider suspectd caused by metformin     Metronidazole Hives     Posaconazole Hives     Tolerates diflucan     Recent Labs   Lab Test  05/28/18   1625  05/22/18   1434  02/12/18   1408  02/08/18   2155   01/13/18   2315  12/09/17   0748   11/07/17   0801   06/27/17   1358   12/29/16   0909   07/13/16   1350   07/14/15   1215   09/03/13   1156   A1C   --   6.2*  6.8*   --    --    --   8.9*   --   8.9*   < >  7.0*   < >   --    < >   --    < >  9.1*   < >  10.8*   LDL   --    --    --    --    --    --    --    --    --    --   64   --    --    --   137*   --   78   < >  90   HDL   --    --    --    --    --    --    --    --    --    --   37*   --    --    --    --    --   39*   --   37*   TRIG   --    --    --    --    --    --    --    --    --    --   267*   --    --    --    --    --   151*   --   171*   ALT  27   --    --   23   --   29   --    --   23   < >  32   < >  26   < >   --    < >   --    < >  38   CR  1.16*   --    --   1.09*   < >  1.62*   --    < >  0.88   < >  0.78   < >  0.72   < >   --    < >  0.67   < >  0.54   GFRESTIMATED  48*   --    --   52*   < >  33*   --     < >  66   < >  76   < >  83   < >   --    < >  >90  Non  GFR Calc     < >  >90   GFRESTBLACK  58*   --    --   63   < >  40*   --    < >  80   < >  >90   GFR Calc     < >  >90   GFR Calc     < >   --    < >  >90   GFR Calc     < >  >90   POTASSIUM  4.5   --    --   4.3   < >  4.0   --    < >  3.9   < >  4.3   < >  4.2   < >   --    < >  4.3   < >  4.4   TSH   --    --    --    --    --    --    --    --   3.21   --    --    --   2.24   < >   --    < >   --    < >  1.42    < > = values in this interval not displayed.      BP Readings from Last 3 Encounters:   05/28/18 129/70   05/22/18 108/74   04/17/18 98/64    Wt Readings from Last 3 Encounters:   05/28/18 230 lb (104.3 kg)   05/22/18 236 lb (107 kg)   04/17/18 234 lb (106.1 kg)        Labs reviewed in EPIC  Problem list, Medication list, Allergies, and Medical/Social/Surgical histories reviewed in [facility] and updated as appropriate.     ROS: Constitutional, neuro, ENT, endocrine, pulmonary, cardiac, gastrointestinal, genitourinary, musculoskeletal, integument and psychiatric systems are negative, except as otherwise noted above in the HPI.   OBJECTIVE:                                                    BP 94/58 (BP Location: Left arm)  Pulse 80  Temp 98.3  F (36.8  C) (Oral)  Resp 18  Wt 235 lb (106.6 kg)  LMP 01/06/2015  SpO2 93%  BMI 45.9 kg/m2  There is no height or weight on file to calculate BMI.  GENERAL: Obese, alert, no distress  EYES: Eyes grossly normal to inspection, extraocular movements - intact  NECK: no tenderness, no adenopathy, no asymmetry, no masses, no stiffness  RESP: lungs clear to auscultation - no rales, no rhonchi, no wheezes  CV: regular rates and rhythm, normal S1 S2, no S3 or S4 and no murmur, no click or rub - no homans or cords  ABDOMEN: soft, no tenderness,  normal bowel sounds  MS: extremities- no gross deformities noted, no edema  SKIN: no suspicious lesions, no  zoya  NEURO: strength and tone- normal, sensory exam- grossly normal, mentation- intact, speech- normal,  Non focal no aphasia. No facial asymmetry.   Mental Status Assessment:  Appearance:   Appropriate   Eye Contact:   Good   Psychomotor Behavior: Normal   Attitude:   Cooperative   Orientation:   All  Speech   Rate / Production: Normal    Volume:  Normal   Mood:    Anxious  Normal  Affect:    Appropriate   Thought Content:  Clear   Thought Form:  Coherent  Logical   Insight:    Fair   Attention Span and Concentration:  appropriate  Recent and Remote Memory:  intact  Fund of Knowledge: appropriate  Muscle Strength and Tone: normal   Suicidal Ideation: reports no thoughts, no intention  Hallucination: Yes  Paranoid-No  Manic-No  Panic-No  Self harm-No    Diagnostic Test Results:  none      ASSESSMENT/PLAN:                                                    (F25.1) Schizoaffective disorder, depressive type (H)  (primary encounter diagnosis)  Comment: stable.  Plan: -Continue with psychiatric medication with no changes.     (I25.119) Coronary artery disease involving native heart with angina pectoris, unspecified vessel or lesion type (H)  Comment: Recent ED visit for lightheadedness. Denies any chest pain, heart palpitations, or syncopal episodes.   Plan: metoprolol succinate (TOPROL-XL) 25 MG 24 hr         tablet        Decreased metoprolol to 25 mg daily; re-evaluate at the next visit.     (E11.22,  N18.3,  Z79.4) Type 2 diabetes mellitus with stage 3 chronic kidney disease, with long-term current use of insulin (H)  Comment: noted above.   Plan: Continue with blood sugar monitoring and insulin coverage with no changes.       Patient Instructions   -Remember to use CPAP at night and keep night light on.     -Reduced Metoprolol dose to 25 mg daily.     -Drink plenty of fluids; water, or low carb powerade/gatorade when it's warm outside.     I spent 30 min spent in direct face to face time with Nena Tang,  greater than 50% in counseling and coordination of care for:  ED follow-up, diabetes and mental health.       ART [facility]

## 2018-06-05 PROBLEM — N18.30 TYPE 2 DIABETES MELLITUS WITH STAGE 3 CHRONIC KIDNEY DISEASE, WITH LONG-TERM CURRENT USE OF INSULIN (H): Status: ACTIVE | Noted: 2018-06-05

## 2018-06-06 ENCOUNTER — TELEPHONE (OUTPATIENT)
Dept: ENDOCRINOLOGY | Facility: CLINIC | Age: 58
End: 2018-06-06

## 2018-06-06 ENCOUNTER — HOSPITAL ENCOUNTER (OUTPATIENT)
Dept: PHYSICAL THERAPY | Facility: CLINIC | Age: 58
Setting detail: THERAPIES SERIES
End: 2018-06-06
Attending: NURSE PRACTITIONER
Payer: MEDICARE

## 2018-06-06 DIAGNOSIS — I25.119 CORONARY ARTERY DISEASE INVOLVING NATIVE HEART WITH ANGINA PECTORIS, UNSPECIFIED VESSEL OR LESION TYPE (H): ICD-10-CM

## 2018-06-06 DIAGNOSIS — F25.0 SCHIZOAFFECTIVE DISORDER, BIPOLAR TYPE (H): ICD-10-CM

## 2018-06-06 DIAGNOSIS — Z79.4 TYPE 2 DIABETES MELLITUS WITH DIABETIC NEUROPATHY, WITH LONG-TERM CURRENT USE OF INSULIN (H): ICD-10-CM

## 2018-06-06 DIAGNOSIS — E11.40 TYPE 2 DIABETES MELLITUS WITH DIABETIC NEUROPATHY, WITH LONG-TERM CURRENT USE OF INSULIN (H): ICD-10-CM

## 2018-06-06 PROCEDURE — G8979 MOBILITY GOAL STATUS: HCPCS | Mod: GP,CI | Performed by: PHYSICAL THERAPIST

## 2018-06-06 PROCEDURE — 97110 THERAPEUTIC EXERCISES: CPT | Mod: GP | Performed by: PHYSICAL THERAPIST

## 2018-06-06 PROCEDURE — G8978 MOBILITY CURRENT STATUS: HCPCS | Mod: GP,CJ | Performed by: PHYSICAL THERAPIST

## 2018-06-06 PROCEDURE — 40000718 ZZHC STATISTIC PT DEPARTMENT ORTHO VISIT: Performed by: PHYSICAL THERAPIST

## 2018-06-06 PROCEDURE — 97112 NEUROMUSCULAR REEDUCATION: CPT | Mod: GP | Performed by: PHYSICAL THERAPIST

## 2018-06-06 RX ORDER — LOSARTAN POTASSIUM 100 MG/1
TABLET ORAL
Qty: 28 TABLET | Refills: 3 | Status: SHIPPED | OUTPATIENT
Start: 2018-06-06 | End: 2018-09-24

## 2018-06-06 NOTE — PROGRESS NOTES
06/06/18 1106   Signing Clinician's Name / Credentials   Signing clinician's name / credentials Kiko Gallego DPT   Session Number   Session Number 6/10 Medicare   Progress Note/Recertification   Progress Note Due Date (10th visit)   Progress Note Completed Date 06/06/18   Recertification Due Date 06/29/18   Mobility: Walking & Moving Around   Mobility Current Status,  (eval/re-eval & every progress note) CJ: 20-39% impairment   Current Mobility Modifier Rationale TUG, clinical judgement   Mobility Goal,  (eval/re-eval, every progress note, & discharge) CI: 1-19% impairment   Goal 1   Goal Identifier TUG   Goal Description Nena will demo TUG with least restrictive AD in 8 sec or less to demonstrate improving strength, balance, and gait speed, as well as lower risk for falls (baseline score 10.16'', no AD)   Target Date 06/29/18  (goal in progress, improvement to 8.10''' with 4WW)   Goal 2   Goal Identifier Gait Speed   Goal Description Nena will demo 25' Gait assessment with LRAD in <7.62 sec to show gait speed of at least 1.0 m/sec needed for improved ambulation in community environments (baseline 25' Gait assessment 10.69 sec equivalent to ~0.7 m/sec).   Target Date 06/08/18   Date Met 05/09/18  (6.56'' no AD, incdicating gait speed of >1.1 m/sec)   Goal 3   Goal Identifier Activity   Goal Description Nena will demonstrate walking with LRAD or use of Nu-Step machine x 8 minutes continuously for improved strength and ability to comply with regular walking program (baseline limited to <2:30).   Target Date 06/29/18  (Goal in progress, tolerating x 10 min with short rest breaks)   Goal 4   Goal Identifier HEP   Goal Description Nena will demo (I) with HEP for continued improvement of strength and safety with gait/mobility skills.   Target Date 06/29/18  (goal in progress)   Subjective Report   Subjective Report Nena reports she was unable to obtain a 4WW due to insurance denying coverage,  hoping to look through [employer] or [employer] to purchase out of pocket.  She states she does not walk long distances and has not been able to attend [employer] games with her day program due to not being able to walk long distances to her seat. Generalized muscle soreness/leg pain with walking reported.   Objective Measure 1   Objective Measure TUG   Details 8.10'' with no AD on 5/9/18   Objective Measure 2   Objective Measure 25' Gait   Details 6.56'' no AD taken 5/9/18   Objective Measure 3   Objective Measure 30'' Sit <> Stand   Details 14 reps taken 5/9/18   Therapeutic Procedure/exercise   Minutes 15   Skilled Intervention cues/demo for technique, ed on HEP, monitoring tolerance and modifications as needed   Patient Response fatigue B thighs reported, mild LBP/hip discomfort reported   Treatment Detail R-stepper x 10' total resistance level 1.0-1.2, Omni effort rated up to 5-6/10, pt taking 2-3 short < 30 sec rest breaks due to fatigue in legs.  Repeated alternating step ups onto 6'' step with BUE support x 1:20, limited by fatigue in legs, ed on this exercise for HEP, progressing 10'' per week.     Progress strengthening to improve stability. Goal 3 in progress   Neuromuscular Re-education   Minutes 25   Skilled Intervention cues for posture, guarding for safety   Patient Response fatigue limiting, rest breaks needed, tolerating well   Treatment Detail AirEx Foam pad balance training:  Trunk rotation holding rubber ball out in front of body, diagonal trunk rotation, functional squatting to multiple targets on ground.  Rest breaks incorporated due to fatigue. Time spent discussing options to find/purchase 4WW out of pocket, provided resources to [employer] and proper dimensions for seat to fit her best.   Progress Goals 1-2 in progress, targeting balance training to improve gait speed/stability   Education   Learner Patient   Readiness Acceptance   Method Explanation;Demonstration   Response Verbalizes  Understanding;Demonstrates Understanding   Education Comments HEP progressions, 4WW options, see above   Plan   Home program Mini squats, sit <> stands, heel raises, standing B rows with YTB, repeated alternating step ups onto stairs at home, walking program 5x/wk   Updates to plan of care PN completed, target dates extended on goals, will continue with 1x/wk x 2-3 more weeks   Plan for next session progressive R-stepper time/resistance, hip strengthening, training with 4WW for endurance/energy conservation longer distance/time gait skills, balance training   Comments   Comments Pt will benefit from ongoing skilled PT intervention to improve overall strength, conditioning, balance/gait training to minimize risk for falls and optimize energy conservation, and establish independence with HEP.  Has made progress thus far, will likely benefit from obtaining a 4WW longterm for energy conservation/tolerance with longer distance walking.   Total Session Time   Timed Code Treatment Minutes 40   Total Treatment Time (sum of timed and untimed services) 40   AMBULATORY CLINICS ONLY-MEDICAL AND TREATMENT DIAGNOSIS   Medical Diagnosis Decreased mobility R26.89    PT Diagnosis Functional weakness and impaired mobility

## 2018-06-06 NOTE — PROGRESS NOTES
[facility]      OUTPATIENT PHYSICAL THERAPY  PLAN OF TREATMENT FOR OUTPATIENT REHABILITATION    Patient's Last Name, First Name, M.I.                YOB: 1960  Nena Tang  TINY                        Provider's Name  [facility] Medical Record No.  [id number]                               Onset Date: 4/16/18 (pt estimates ~1 yr ago)   Start of Care Date: 4/16/18   Type:     _X_PT   ___OT   ___SLP Medical Diagnosis: Decreased Mobility (R26.89)                       PT Diagnosis: Functional Weakness and Impaired Mobility      _________________________________________________________________________________  Plan of Treatment: Strengthening, Balance/Gait training, Neuromuscular Reeducation, ROM, Stretching, Manual therapy, Mobilizations    Frequency/Duration: 1x/wk x 3 wks     Goals:  Goal Identifier TUG   Goal Description Nena will demo TUG with least restrictive AD in 8 sec or less to demonstrate improving strength, balance, and gait speed, as well as lower risk for falls (baseline score 10.16'', no AD)   Target Date 06/29/18 (goal in progress, improvement to 8.10''' with 4WW)   Date Met      Progress:     Goal Identifier Gait Speed   Goal Description Nena will demo 25' Gait assessment with LRAD in <7.62 sec to show gait speed of at least 1.0 m/sec needed for improved ambulation in community environments (baseline 25' Gait assessment 10.69 sec equivalent to ~0.7 m/sec).   Target Date 06/08/18   Date Met  05/09/18 (6.56'' no AD, incdicating gait speed of >1.1 m/sec)   Progress:     Goal Identifier Activity   Goal Description Nena will demonstrate walking with LRAD or use of Nu-Step machine x 8 minutes continuously for improved strength and ability to comply with regular walking program (baseline limited to <2:30).   Target Date 06/29/18 (Goal in progress, tolerating x 10  min with short rest breaks)   Date Met      Progress:     Goal Identifier HEP   Goal Description Nena will demo (I) with HEP for continued improvement of strength and safety with gait/mobility skills.   Target Date 06/29/18 (goal in progress)   Date Met      Progress:       Progress Toward Goals:   Progress this reporting period: Nena has been seen for a total of 6 PT visits thus far, has made some improvement in strength, overall endurance and balance. Some scheduling conflicts/no-shows, requiring extended POC.  Pt will benefit from ongoing skilled PT intervention to improve overall strength, conditioning, balance/gait training to minimize risk for falls and optimize energy conservation, and establish independence with HEP. Has made progress thus far, will likely benefit from obtaining a 4WW longterm for energy conservation/tolerance with longer distance walking.    Certification date from 6/9/18 to 6/29/18.    Kiko Gallego, PT          I CERTIFY THE NEED FOR THESE SERVICES FURNISHED UNDER        THIS PLAN OF TREATMENT AND WHILE UNDER MY CARE     (Physician co-signature of this document indicates review and certification of the therapy plan).                Referring Provider: Wendy Tang, ART, CNP

## 2018-06-06 NOTE — TELEPHONE ENCOUNTER
gabapentin (NEURONTIN) 300 MG capsule  Controlled Substance Refill Request    Last refill: 5/9/18, , 168 for 28 days     Last clinic visit: 6/4/18    Next appt: 6/22/18    Controlled substance agreement on file: No.    Documentation in problem list reviewed:  Yes    Processing:  Excribe    [place] reviewed:  reviewed- no concerns  [place]:  [email or web address]      paliperidone (INVEGA) 9 MG 24 hr tablet  Routing refill request to provider for review/approval because:  Drug not on the [facility] refill protocol       Indra Le RN

## 2018-06-06 NOTE — TELEPHONE ENCOUNTER
Spoke with patient in regards to upcoming appointment with Debbie Germain. Advised patient to arrive 15 minutes prior to scheduled appointment time and complete assigned questionnaires ahead of time, if possible. Patient had no questions at this time and confirmed she would be here.

## 2018-06-07 ENCOUNTER — OFFICE VISIT (OUTPATIENT)
Dept: ENDOCRINOLOGY | Facility: CLINIC | Age: 58
End: 2018-06-07
Payer: MEDICARE

## 2018-06-07 VITALS
DIASTOLIC BLOOD PRESSURE: 89 MMHG | HEART RATE: 80 BPM | SYSTOLIC BLOOD PRESSURE: 157 MMHG | HEIGHT: 61 IN | OXYGEN SATURATION: 97 % | BODY MASS INDEX: 44.8 KG/M2 | WEIGHT: 237.3 LBS

## 2018-06-07 DIAGNOSIS — Z79.4 TYPE 2 DIABETES MELLITUS WITH MILD NONPROLIFERATIVE RETINOPATHY WITHOUT MACULAR EDEMA, WITH LONG-TERM CURRENT USE OF INSULIN, UNSPECIFIED LATERALITY (H): ICD-10-CM

## 2018-06-07 DIAGNOSIS — E11.3299 TYPE 2 DIABETES MELLITUS WITH MILD NONPROLIFERATIVE RETINOPATHY WITHOUT MACULAR EDEMA, WITH LONG-TERM CURRENT USE OF INSULIN, UNSPECIFIED LATERALITY (H): ICD-10-CM

## 2018-06-07 DIAGNOSIS — E66.01 MORBID OBESITY (H): Primary | ICD-10-CM

## 2018-06-07 DIAGNOSIS — G25.9 EXTRAPYRAMIDAL AND MOVEMENT DISORDER: ICD-10-CM

## 2018-06-07 RX ORDER — TOPIRAMATE 25 MG/1
TABLET, FILM COATED ORAL
Qty: 90 TABLET | Refills: 3 | Status: SHIPPED | OUTPATIENT
Start: 2018-06-07 | End: 2018-09-10

## 2018-06-07 RX ORDER — BENZTROPINE MESYLATE 0.5 MG/1
0.5 TABLET ORAL 2 TIMES DAILY
Qty: 60 TABLET | Refills: 1 | Status: SHIPPED | OUTPATIENT
Start: 2018-06-07 | End: 2018-06-08

## 2018-06-07 RX ORDER — PALIPERIDONE 9 MG/1
9 TABLET, EXTENDED RELEASE ORAL EVERY MORNING
Qty: 30 TABLET | Refills: 3 | Status: SHIPPED | OUTPATIENT
Start: 2018-06-07 | End: 2018-06-08

## 2018-06-07 RX ORDER — GABAPENTIN 300 MG/1
CAPSULE ORAL
Qty: 168 CAPSULE | Refills: 0 | Status: SHIPPED | OUTPATIENT
Start: 2018-06-09 | End: 2018-07-03

## 2018-06-07 NOTE — TELEPHONE ENCOUNTER
TRULICITY 1.5 MG/0.5ML pen  Last Written Prescription Date:  4/16/18  Last Fill Quantity: 2 mL,  # refills:    Last office visit: 6/4/2018 with prescribing provider:     Future Office Visit:   Next 5 appointments (look out 90 days)     Jun 08, 2018  3:00 PM CDT   [facility] with BIN Mondragon   [facility]    [address]  [address]  [facility] [address]   [phone]            Jun 08, 2018  3:30 PM CDT   Return Visit with Kraig Pedersen MD   [facility]    [address]  [facility] [address]   [phone]            Jun 22, 2018  1:00 PM CDT   Return Visit with ART Arias CNP   [facility] ([facility])    [address]  [address]  [facility] [address]   [phone]            Jun 22, 2018  1:00 PM CDT   Return Visit with BIN Mondragon   [facility]    [address]  [address]  [facility] [address]   [phone]            Jul 17, 2018 10:30 AM CDT   Return Visit with ART Arias CNP   [facility]    [address]  [address]  [facility] [address]   [phone]                 Requested Prescriptions   Pending Prescriptions Disp Refills     dulaglutide (TRULICITY) 1.5 MG/0.5ML pen 2 mL 0    GLP-1 Agonists Protocol Failed    6/7/2018  1:57 PM       Failed - Normal serum creatinine on file in past 12 months    Recent Labs   Lab Test  05/28/18   1625   CR  1.16*            Passed - Blood pressure less than 140/90 in past 6 months    BP Readings from Last 3 Encounters:   06/04/18 94/58   05/28/18 129/70   05/22/18 108/74                Passed - LDL on file in past 12 months    Recent Labs  "  Lab Test  06/27/17   1358   LDL  64            Passed - Microalbumin on file in past 12 months    Recent Labs   Lab Test  06/27/17   1404   MICROL  11   UMALCR  6.81            Passed - HgbA1C in past 3 or 6 months    If HgbA1C is 8 or greater, it needs to be on file within the past 3 months.  If less than 8, must be on file within the past 6 months.     Recent Labs   Lab Test  05/22/18   1434   A1C  6.2*            Passed - Patient is age 18 or older       Passed - No active pregnancy on record       Passed - No positive pregnancy test in past 12 months       Passed - Recent (6 mo) or future (30 days) visit within the authorizing provider's specialty    Patient had office visit in the last 6 months or has a visit in the next 30 days with authorizing provider.  See \"Patient Info\" tab in inbasket, or \"Choose Columns\" in Meds & Orders section of the refill encounter.              "

## 2018-06-07 NOTE — NURSING NOTE
"    Chief Complaint   Patient presents with     Weight Problem     NMWM     Vitals:    06/07/18 1641   BP: 157/89   Pulse: 80   SpO2: 97%   Weight: 237 lb 4.8 oz   Height: 5' 0.63\"     Body mass index is 45.39 kg/(m^2).  Татьяна Munoz CMA    "

## 2018-06-07 NOTE — PATIENT INSTRUCTIONS
See Debbie in 3-4 months for return [facility]    Start topiramate ramp to 75mg    MEDICATION STARTED AT THIS APPOINTMENT  We are starting topiramate at bedtime.  Start one tab, 25 mg, for a week. Go up to 50 mg (2 tabs) for the next week. At the third week, take   3 tabs (75 mg).  Stay at 3 tabs until you are seen again. Call the nurse at [phone] if you have any questions or concerns. (Do not stop taking it if you don't think it's working. For some people it works even though they do not feel much different.)    Topiramate (Topamax) is a medication that is used most often to treat migraine headaches or for seizures. It has also been found to help with weight loss. Although it's not currently FDA approved for weight loss, it has been used safely for a number of years to help people who are carrying extra weight.     Just how topiramate helps with weight loss has not been exactly determined. However it seems to work on areas of the brain to quiet down signals related to eating.      Topiramate may make you:    >feel less interest in eating in between meals   >think less about food and eating   >find it easier to push the plate away   >find giving up pop easier    >have an easier time eating less    For some of our patients, the pills work right away. They feel and think quite differently about food. Other patients don't feel much of a change but find in fact they have lost weight! Like all weight loss medications, topiramate works best when you help it work.  This means:    1) Have less tempting high calorie (fattening) food around the house or office    2) Have lower calorie food (fruits, vegetables,low fat meats and dairy) for snacks    3) Eat out only one time or less each week.   4) Eat your meals at a table with the TV or computer off.    Side-effects. Topiramate is generally well tolerated. The main side-effects we see are:   Tingling in hands,feet, or face (usually not very troublesome)   Mental confusion and  word finding trouble (about 10% of patients have this.)     Feeling sleepy or a bit dopey- this goes away very soon after starting.    One of the dangers of topiramate is the possibility of birth defects--if you get pregnant when you are on it, there is the risk that your baby will be born with a cleft lip or palate.  If you are on topiramate and of child bearing age, you need to be on a reliable form of birth control or refrain from sexual intercourse.     Please refer to the pharmacy insert for more information on side-effects. Since many pharmacists are not familiar with the use of topiramate in weight loss, calling the clinic will get you the most accurate information on the use of this medication for weight loss.     In order to get refills of this or any medication we prescribe you must be seen in the medical weight mgmt clinic every 2-3 months. Please have your pharmacy fax a refill request to [phone].

## 2018-06-07 NOTE — PROGRESS NOTES
[facility] Psychiatry Progress Note                                                                Nena Tang is a 58 year old female who was referred by her primary care provider for treatment and evaluation of depression and psychosis  Therapist: N/A  PCP: Rowena Haas  Other Providers: N/A     History was provided by patient and care taker Zheng Barnes (tel # [phone]) who was a limited historian.    Pertinent Background:  See section specific documentation.  Psych critical item history includes suicidal ideation, psychosis [sxs include hallucinations], mutiple psychotropic trials, psych hosp (>5) and SUBSTANCE USE: cocaine and alcohol   Interim History                                                                                                        4, 4     She reported that her depression is under better control.    She expressed worry thoughts about her sister's failing health and is wondering is she should go visit her in [place]. She expressed fear of seeing her sister suffering.    She reported still seeing the man (hallucination) in her room who wears a cape and has red eyes. She stated that the man makes disparaging statements towards her and makes her want to end her life. She stated that praying helps her not think about suicide. She stated that she is able to use reality testing and tell the voice that she is not going to listen to it.     She opted to stay on the current dose of Paliperidone but agreed to increasing her Sertraline dose.     Recent Symptoms:   Depression:  suicidal ideation without plan, without intent, depressed mood and overwhelmed  Psychosis:  auditory hallucinations and visual hallucinations  ADVERSE EFFECTS: weight gain, involuntary tongue movements     Recent Substance Use:  none reported    Social/ Family History                                  [per patient report]                                 1ea,1ea   She is currently on SSDI and lives  in an Adult Foster [facility] Facility. She is  for the last 3-4 years and was  about 12 years. She has two adult sons. She reported no history of abuse in her life    Medical / Surgical History                                                                                                                  Patient Active Problem List   Diagnosis     Osteopenia     Vitamin B12 deficiency without anemia     Hyperlipidemia LDL goal <100     Rotator cuff syndrome     Type 2 diabetes mellitus with mild nonproliferative retinopathy (H)     Illiterate     Irritable bowel syndrome     overweight - BMI >35     Takotsubo cardiomyopathy     CAD (coronary artery disease)     Restless legs syndrome (RLS)     CINDY (obstructive sleep apnea)- mild AHI 10.3     Verbal auditory hallucination     Chronic low back pain     Schizoaffective disorder, depressive type (H)     Migraine headache     HTN, goal below 140/90     Health Care Home     Lumbago     Cervicalgia     Cocaine abuse, episodic     Suicidal ideation     Esophageal reflux     Mild nonproliferative diabetic retinopathy (H)     Tardive dyskinesia     Alcohol use     Left cataract     Falls frequently     History of uterine cancer     Psychophysiological insomnia     Dysuria     Asymptomatic postmenopausal status     Abdominal pain, right lower quadrant     Sepsis (H)     Pneumonia of right lower lobe due to infectious organism (H)     Infectious encephalopathy     Non-intractable vomiting with nausea     Acute respiratory failure with hypoxia (H)     Thoughts of self harm     Gastroenteritis     Posttraumatic stress disorder     Cervical cancer screening     Type 2 diabetes mellitus with stage 3 chronic kidney disease, with long-term current use of insulin (H)       Past Surgical History:   Procedure Laterality Date     C OOPHORECTORMY FOR RAHEL, W/BX  1983    UTERINE     CATARACT IOL, RT/LT Bilateral 2017     COLONOSCOPY N/A 3/16/2017    Procedure: COLONOSCOPY;   Surgeon: Traci Gonzaelz MD;  Location:  GI     Coronary CTA  5/21/2014     HYSTERECTOMY  1983    uterine cancer yearly pap's per provider.     LAPAROSCOPIC CHOLECYSTECTOMY  2008     PHACOEMULSIFICATION CLEAR CORNEA WITH STANDARD INTRAOCULAR LENS IMPLANT Left 5/5/2017    Procedure: PHACOEMULSIFICATION CLEAR CORNEA WITH STANDARD INTRAOCULAR LENS IMPLANT;  LEFT EYE PHACOEMULSIFICATION CLEAR CORNEA WITH STANDARD INTRAOCULAR LENS IMPLANT ;  Surgeon: Tyra Diaz MD;  Location:  [facility]     PHACOEMULSIFICATION CLEAR CORNEA WITH STANDARD INTRAOCULAR LENS IMPLANT Right 6/30/2017    Procedure: PHACOEMULSIFICATION CLEAR CORNEA WITH STANDARD INTRAOCULAR LENS IMPLANT;  RIGHT EYE PHACOEMULSIFICATION CLEAR CORNEA WITH STANDARD INTRAOCULAR LENS IMPLANT;  Surgeon: Tyra Diaz MD;  Location:  [facility]     RELEASE TRIGGER FINGER  10/11/2012    Left thumb. Procedure: RELEASE TRIGGER FINGER;  LEFT THUMB TRIGGER RELEASE;  Surgeon: Tay Langley MD;  Location:  [facility]     RELEASE TRIGGER FINGER Right 12/26/2016    Procedure: RELEASE TRIGGER FINGER;  Surgeon: Albino Castañeda MD;  Location:  OR        Medical Review of Systems                                                                                                    2,10   A comprehensive review of systems was performed and is negative other than noted in the HPI.  Allergy                                Imidazole antifungals; Ketoprofen; Lisinopril; Metformin; Metronidazole; and Posaconazole  Current Medications                                                                                                       Current Outpatient Prescriptions   Medication Sig Dispense Refill     acetaminophen (TYLENOL) 500 MG tablet Take 2 tablets (1,000 mg) by mouth 3 times daily as needed for mild pain 100 tablet 0     ACETAMINOPHEN PO Take 1,000 mg by mouth every 6 hours as needed for pain or fever       albuterol (PROAIR HFA/PROVENTIL HFA/VENTOLIN HFA) 108 (90  BASE) MCG/ACT Inhaler Inhale 2 puffs into the lungs every 6 hours as needed for shortness of breath / dyspnea or wheezing 3 Inhaler 1     aspirin 81 MG EC tablet TAKE 1 TABLET (81MG) BY MOUTH DAILY 28 tablet 3     atorvastatin (LIPITOR) 80 MG tablet TAKE 1 TABLET (80MG) BY MOUTH DAILY 28 tablet 11     benztropine (COGENTIN) 0.5 MG tablet Take 1 tablet (0.5 mg) by mouth 2 times daily 60 tablet 1     blood glucose monitoring (ONE TOUCH DELICA) lancets Use to test blood sugar 2 times daily or as directed.  Ok to substitute alternative if insurance prefers. 150 each 11     blood glucose monitoring (ONETOUCH VERIO IQ) test strip Use to test blood sugar 4 times daily .  Ok to substitute alternative if insurance prefers. 200 strip 11     calcium carbonate (OS-ALLEN 600 MG [place]. CA) 1500 (600 CA) MG tablet Take 1 tablet (1,500 mg) by mouth daily 180 tablet 3     clotrimazole (LOTRIMIN) 1 % cream Apply topically 2 times daily       DiphenhydrAMINE HCl (DIPHENDRYL PO) Take 25 mg by mouth every 6 hours as needed for itching       [START ON 6/9/2018] gabapentin (NEURONTIN) 300 MG capsule TAKE 2 CAPSULES (600MG) BY MOUTH THREE TIMES A  capsule 0     glucose 4 G CHEW chewable tablet Take 2 every 15 minutes for blood sugar <70mg/dL. Recheck blood sugar every 15 minutes until above 70mg/dL, then eat a substantial meal. 20 tablet 1     ibuprofen (ADVIL,MOTRIN) 600 MG tablet Take 1 tablet (600 mg) by mouth every 4 hours as needed for moderate pain 60 tablet 0     insulin aspart (NOVOLOG FLEXPEN) 100 UNIT/ML injection Inject 20 Units Subcutaneous 3 times daily (with meals) Once daily, can add additional 5 units if BGs are >500mg/dL. 15 mL 11     insulin glargine (LANTUS) 100 UNIT/ML injection Inject 48 Units Subcutaneous At Bedtime 3 mL 11     insulin pen needle (NOVOFINE 30) 30G X 8 MM USE 4 DAILY OR AS DIRECTED 400 each 0     losartan (COZAAR) 100 MG tablet TAKE 1 TABLET (100MG) BY MOUTH DAILY 28 tablet 3     melatonin 5 MG  CAPS Take 2 capsules by mouth daily At dinnertime 180 capsule 3     metoprolol succinate (TOPROL-XL) 25 MG 24 hr tablet Take 1 tablet (25 mg) by mouth daily 30 tablet 1     order for DME Equipment being ordered: 4 Wheeled walker with seat and brakes. 1 each 0     order for DME Diabetic Shoes 2 each 0     order for DME Hold Novolog if meals are refused. 1 each 0     paliperidone (INVEGA) 9 MG 24 hr tablet Take 1 tablet (9 mg) by mouth every morning 30 tablet 3     polyethylene glycol (MIRALAX/GLYCOLAX) powder TAKE 8.5 GRAMS (1/2 CAPFUL) BY MOUTH DAILY 527 g 3     prochlorperazine (COMPAZINE) 5 MG tablet Take 1 tablet (5 mg) by mouth every 6 hours as needed for nausea or vomiting 90 tablet 0     ranitidine (ZANTAC) 300 MG tablet TAKE 1 TABLET (300MG) BY MOUTH AT BEDTIME 90 tablet 1     senna-docusate (SENOKOT-S;PERICOLACE) 8.6-50 MG per tablet Take 1 tablet by mouth 2 times daily as needed for constipation       sertraline (ZOLOFT) 100 MG tablet Take 1.5 tablets (150 mg) by mouth daily 60 tablet 1     SUMAtriptan (IMITREX) 25 MG tablet Take 1-2 tablets (25-50 mg) by mouth at onset of headache for migraine May repeat in 2 hours. Max 8 tablets/24 hours. 12 tablet 1     TRULICITY 1.5 MG/0.5ML pen INJECT 1.5MG SUBCUTANEOUSLY EVERY SEVEN DAYS 2 mL 0     vitamin D3 (CHOLECALCIFEROL) 76490 UNITS capsule TAKE 1 CAPSULE (50,000 UNITS) MONTHLY 12 capsule 1     [DISCONTINUED] benztropine (COGENTIN) 0.5 MG tablet Take 1 tablet (0.5 mg) by mouth 2 times daily 60 tablet 1     Vitals                                                                                                                       3, 3   LMP 01/06/2015   Mental Status Exam                                                                                    9, 14 cog gs     Appearance: casually groomed  Behavior/Demeanor: cooperative, with good  eye contact   Speech: normal  Language: intact  Psychomotor: abnormal movements of the tongue and lower jaw  Mood:  depressed  Affect: full range; was congruent to mood; was congruent to content  Thought Process/Associations: unremarkable  Thought Content:  Reports none;  Denies suicidal ideation and violent ideation  Perception:  Reports none;  Denies auditory hallucinations and visual hallucinations  Insight: limited  Judgment: limited  Cognition: (6) does  appear grossly intact; formal cognitive testing was not done  grossly oriented to her circumstances  Gait and Station: unremarkable     Labs and Data                                                                                                                 PHQ9   PHQ-9 SCORE 1/2/2017 2/3/2017 3/13/2018   Total Score - - -   Total Score - - -   Total Score 0 0 14     Diagnosis and Assessment                                                                             m2, h3     Today the following issues were addressed:    1) Psychosis  2) Grief    [place] Prescription Monitoring Program [] review was not needed today.    PSYCHOTROPIC DRUG INTERACTIONS: none clinically relevant     Diagnosis:  Schizoaffective Disorder    Plan                                                                                                                    m2, h3      - Increase Zoloft (Sertraline) to 200 mg per day.   - Continue other psychiatric medications.  Current Outpatient Prescriptions   Medication Sig     benztropine (COGENTIN) 0.5 MG tablet Take 1 tablet (0.5 mg) by mouth 2 times daily     paliperidone (INVEGA) 9 MG 24 hr tablet Take 1 tablet (9 mg) by mouth every morning     senna-docusate (SENOKOT-S;PERICOLACE) 8.6-50 MG per tablet Take 1 tablet by mouth 2 times daily as needed for constipation     sertraline (ZOLOFT) 100 MG tablet Take 2 tablets (200 mg) by mouth daily     acetaminophen (TYLENOL) 500 MG tablet Take 2 tablets (1,000 mg) by mouth 3 times daily as needed for mild pain     ACETAMINOPHEN PO Take 1,000 mg by mouth every 6 hours as needed for pain or fever      albuterol (PROAIR HFA/PROVENTIL HFA/VENTOLIN HFA) 108 (90 BASE) MCG/ACT Inhaler Inhale 2 puffs into the lungs every 6 hours as needed for shortness of breath / dyspnea or wheezing     aspirin 81 MG EC tablet TAKE 1 TABLET (81MG) BY MOUTH DAILY     blood glucose monitoring (ONE TOUCH DELICA) lancets Use to test blood sugar 2 times daily or as directed.  Ok to substitute alternative if insurance prefers.     blood glucose monitoring (ONETOUCH VERIO IQ) test strip Use to test blood sugar 4 times daily .  Ok to substitute alternative if insurance prefers.     calcium carbonate (OS-ALLEN 600 MG [place]. CA) 1500 (600 CA) MG tablet Take 1 tablet (1,500 mg) by mouth daily (Patient not taking: Reported on 6/7/2018)     clotrimazole (LOTRIMIN) 1 % cream Apply topically 2 times daily     DiphenhydrAMINE HCl (DIPHENDRYL PO) Take 25 mg by mouth every 6 hours as needed for itching     [START ON 6/9/2018] gabapentin (NEURONTIN) 300 MG capsule TAKE 2 CAPSULES (600MG) BY MOUTH THREE TIMES A DAY     glucose 4 G CHEW chewable tablet Take 2 every 15 minutes for blood sugar <70mg/dL. Recheck blood sugar every 15 minutes until above 70mg/dL, then eat a substantial meal. (Patient not taking: Reported on 6/7/2018)     ibuprofen (ADVIL,MOTRIN) 600 MG tablet Take 1 tablet (600 mg) by mouth every 4 hours as needed for moderate pain     insulin aspart (NOVOLOG FLEXPEN) 100 UNIT/ML injection Inject 20 Units Subcutaneous 3 times daily (with meals) Once daily, can add additional 5 units if BGs are >500mg/dL.     insulin glargine (LANTUS) 100 UNIT/ML injection Inject 48 Units Subcutaneous At Bedtime     insulin pen needle (NOVOFINE 30) 30G X 8 MM USE 4 DAILY OR AS DIRECTED     losartan (COZAAR) 100 MG tablet TAKE 1 TABLET (100MG) BY MOUTH DAILY     melatonin 5 MG CAPS Take 2 capsules by mouth daily At dinnertime     metoprolol succinate (TOPROL-XL) 25 MG 24 hr tablet Take 1 tablet (25 mg) by mouth daily     order for DME Equipment being ordered: 4  Wheeled walker with seat and brakes.     order for DME Diabetic Shoes     order for DME Hold Novolog if meals are refused.     polyethylene glycol (MIRALAX/GLYCOLAX) powder TAKE 8.5 GRAMS (1/2 CAPFUL) BY MOUTH DAILY (Patient not taking: Reported on 6/7/2018)     prochlorperazine (COMPAZINE) 5 MG tablet Take 1 tablet (5 mg) by mouth every 6 hours as needed for nausea or vomiting (Patient not taking: Reported on 6/7/2018)     ranitidine (ZANTAC) 300 MG tablet TAKE 1 TABLET (300MG) BY MOUTH AT BEDTIME (Patient not taking: Reported on 6/7/2018)     SUMAtriptan (IMITREX) 25 MG tablet Take 1-2 tablets (25-50 mg) by mouth at onset of headache for migraine May repeat in 2 hours. Max 8 tablets/24 hours. (Patient not taking: Reported on 6/7/2018)     topiramate (TOPAMAX) 25 MG tablet 25mg at bedtime for week 1, 50mg at bedtime for 1 week, and 75mg at bedtime thereafter     TRULICITY 1.5 MG/0.5ML pen INJECT 1.5MG SUBCUTANEOUSLY EVERY SEVEN DAYS     vitamin D3 (CHOLECALCIFEROL) 55610 UNITS capsule TAKE 1 CAPSULE (50,000 UNITS) MONTHLY (Patient not taking: Reported on 6/7/2018)     [DISCONTINUED] benztropine (COGENTIN) 0.5 MG tablet Take 1 tablet (0.5 mg) by mouth 2 times daily     [DISCONTINUED] paliperidone (INVEGA) 9 MG 24 hr tablet Take 1 tablet (9 mg) by mouth every morning     [DISCONTINUED] sertraline (ZOLOFT) 100 MG tablet Take 1.5 tablets (150 mg) by mouth daily     No current facility-administered medications for this visit.        RTC: 3 months    CRISIS NUMBERS:   Provided routinely in AVS.    Treatment Risk Statement:  The patient understands the risks, benefits, adverse effects and alternatives. Agrees to treatment with the capacity to do so. No medical contraindications to treatment. Agrees to call clinic for any problems. The patient understands to call 911 or go to the nearest ED if life threatening or urgent symptoms occur.      [facility] Individual Psychotherapy Note                                                                      [16]     Start time - 3:30 pm     End time - 3:50 pm    Subjective: This supportive psychotherapy session addressed issues related to sister's failing health and symptoms of psychosis.  Patient's reaction: Contemplation in the context of mental status appropriate for ambulatory setting.  Progress: fair  Plan: RTC 3 months  Psychotherapy services during this visit included myself and the patient.   PROVIDER:  Karig Pedersen MD

## 2018-06-07 NOTE — TELEPHONE ENCOUNTER
Last Written Prescription Date:  4/20/18  Last Fill Quantity: 60,  # refills: 1   Last office visit: 6/4/2018 with prescribing provider:     Future Office Visit:   Next 5 appointments (look out 90 days)     Jun 08, 2018  3:00 PM CDT   Return Visit with BIN Mondragon   [facility] ([facility])    [address]  [address]  [facility] [address]   [phone]            Jun 08, 2018  3:30 PM CDT   Return Visit with Kraig Pedersen MD   [facility] ([facility])    [address]  [facility] [address]   [phone]            Jun 22, 2018  1:00 PM CDT   Return Visit with ART Arias CNP   [facility] ([facility])    [address]  [address]  [facility] [address]   [phone]            Jun 22, 2018  1:00 PM CDT   Return Visit with BIN Mondragon   [facility] ([facility])    [address]  [address]  [facility] [address]   [phone]            Jul 17, 2018 10:30 AM CDT   Return Visit with ART Arias CNP   [facility] ([facility])    [address]  [address]  [facility] [address]   [phone]                 Requested Prescriptions   Pending Prescriptions Disp Refills     benztropine (COGENTIN) 0.5 MG tablet 60 tablet 1     Sig: Take 1 tablet (0.5 mg) by mouth 2 times daily    Antiparkinson's Agents Protocol Passed    6/7/2018  8:11 AM       Passed - Blood pressure under 140/90 in past 12 months    BP Readings from Last 3 Encounters:   06/04/18 94/58   05/28/18 129/70   05/22/18 108/74                Passed - CBC on record in past 12 months    Recent Labs   Lab Test  05/28/18   1625   WBC  6.9   RBC  3.33*   HGB  10.2*   HCT  31.7*   PLT  216  "      For [facility] ONLY: QLEX647 = WBC, EAAM285 = RBC         Passed - ALT on record in past 12 months        Recent Labs   Lab Test  05/28/18   1625   ALT  27            Passed - Serum Creatinine on file in past 12 months    Recent Labs   Lab Test  05/28/18   1625   CR  1.16*            Passed - Recent (12 mo) or future (30 days) visit within the authorizing provider's specialty    Patient had office visit in the last 12 months or has a visit in the next 30 days with authorizing provider or within the authorizing provider's specialty.  See \"Patient Info\" tab in inbasket, or \"Choose Columns\" in Meds & Orders section of the refill encounter.           Passed - Patient is age 18 or older       Passed - No active pregnancy on record       Passed - No positive pregnancy test in the past 12 months          "

## 2018-06-07 NOTE — PROGRESS NOTES
"New Medical Weight Management Consult    PATIENT:  Nena Tang  MRN:         [id number]  :         1960  GINNY:         2018    Dear Rowena Haas,    I had the pleasure of seeing your patient, Nena Tang.  Full intake/assessment done to determine barriers to weight loss success and develop a treatment plan.  Nena Tang is a 58 year old female interested in treatment of medical problems associated with weight.  Her weight today is 237 lbs 4.8 oz, Body mass index is 45.39 kg/(m^2)., and she has the following co-morbidities:     2018   I have the following co-morbidities associated with obesity: Type II Diabetes, Pre-Diabetes, Heart Disease, High Blood Pressure, High Cholesterol     Lantus 48 units at bedtime and novolog 20 units TID with meals, Trulicity  Dx with Type II DM over 20 yrs ago  Last A1C 6.2    Schizoaffective disorder diagnosed 20 years ago with weight gain from medications.     Patient Goals Reviewed With Patient 2018   I am interested in attaining a healthier weight to diminish current health problems related to co-morbid conditions: Yes   I am interested in attaining a healthier weight in order to prevent future health problems: Yes       Referring Provider 2018   Please name the provider who referred you to Medical Weight Management.  If you do not know, please answer: \"I Don't Know\". [email or web address]       Wt Readings from Last 4 Encounters:   18 237 lb 4.8 oz   18 235 lb   18 230 lb   18 236 lb       Weight History Reviewed With Patient 2018   How concerned are you about your weight? Very Concerned   Would you describe your weight gain as gradual? Yes   I became overweight: As an Adult   The following factors have contributed to my weight gain:  A Health Crisis/Stress   I have tried the following methods to lose weight: Exercise   I have the following family history of obesity/being overweight:  One or more of my siblings are " overweight   Has anyone in your family had weight loss surgery? No       Diet Recall Reviewed With Patient 6/7/2018   How many glasses of juice do you drink in a typical day? 2   How many of glasses of milk do you drink in a typical day? 1   How many 8oz glasses of sugar containing drinks such as Josh-Aid/sweet tea do you drink in a day? 2   How many cans/bottles of sugar pop/soda/tea/sports drinks do you drink in a day? 2   How many cans/bottles of diet pop/soda/tea or sports drink do you drink in a day? 2   How often do you have a drink of alcohol? Never       Eating Habits Reviewed With Patient 6/7/2018   Generally, my meals include foods like these: bread, pasta, rice, potatoes, corn, crackers, sweet dessert, pop, or juice. Everyday   Generally, my meals include foods like these: fried meats, brats, burgers, french fries, pizza, cheese, chips, or ice cream. Everyday   Eat fast food (like [employer], [employer], [employer]aco Bell). Almost Everyday   Eat at a buffet or sit-down restaurant. Half of the Week   Eat most of my meals in front of the TV or computer. Never   Often skip meals, eat at random times, have no regular eating times. Half of the Week   Eat extra snacks between meals. Almost Everyday   Eat most of my food at the end of the day. Half of the Week   Eat in the middle of the night or wake up at night to eat. Never   Eat extra snacks to prevent or correct low blood sugar. Almost Everyday   Eat to prevent acid reflux or stomach pain. Almost Everyday   Worry about not having enough food to eat. Never   Have you been to the food shelf at least a few times this year? No   I eat when I am depressed, stressed, anxious, or bored. Almost Everyday   I eat when I am happy or as a reward. Almost Everyday   I feel hungry all the time even if I just have eaten. Almost Everyday   Feeling full is important to me. Almost Everyday   Once I start eating, it is hard to stop. Almost Everyday   I finish all the food on my plate  even if I am already full. Almost Everyday   I can't resist eating delicious food or walk past the good food/smell. Everyday   I eat/snack without noticing that I am eating. Everyday   I eat when I am preparing the meal. Never   I eat more than usual when I see others eating. Almost Everyday   I have trouble not eating sweets, ice cream, cookies, or chips if they are around the house. Almost Everyday   I think about food all day. Almost Everyday   What foods, if any, do you crave? Sweets/Candy/Chocolate   I feel out of control when eating. Everyday   I eat a large amount of food, like a loaf of bread, a box of cookies, a pint/quart of ice cream, all at once. Never   I eat a large amount of food even when I am not hungry. Never   I eat rapidly. Everyday   I eat alone because I feel embarrassed and do not want others to see how much I have eaten. Everyday   I eat until I am uncomfortably full. Everyday   I feel bad, disgusted, or guilty after I overeat. Almost Everyday   I make myself vomit what I have eaten or use laxatives to get rid of food. Never       Activity/Exercise History Reviewed With Patient 6/7/2018   How much of a typical 12 hour day do you spend sitting? Most of the Day   How much of a typical 12 hour day do you spend lying down? Half the Day   How much of a typical day do you spend walking/standing? Half the Day   How many hours (not including work) do you spend on the TV/Video Games/Computer/Tablet/Phone? 6 Hours or More   How many times a week are you active for the purpose of exercise? Never   How many total minutes do you spend doing some activity for the purpose of exercising when you exercise? None   What keeps you from being more active? Pain, Shortness of Breath, Too tired, Worried People Will Look At Me       PAST MEDICAL HISTORY:  Past Medical History:   Diagnosis Date     CAD (coronary artery disease)     5/2014 cath, nonbostructive stenosis to LAD, RCA.     Chronic low back pain 1/22/2013      Cocaine abuse, in remission      Fecal urgency 3/8/2012     History of heroin abuse      Hyperlipidemia LDL goal <100 10/31/2010     Hypertension 7/29/2013     Illiterate 8/30/2011     Irritable bowel syndrome      Left cataract      Migraine 4/19/2012     Migraine headache 4/22/2013     Moderate major depression (H) 6/8/2011     Noncompliance with medication regimen 6/8/2011     Obesity      CINDY (obstructive sleep apnea) 3/8/2012    uses CPAP     Osteopenia 10/7/2009     Schizoaffective disorder, depressive type (H) 2/25/2013     Suicidal intent 10/2/2013     Takotsubo cardiomyopathy      Type 2 diabetes mellitus (H) 8/30/2011     Uterine cancer (H) 1983     Verbal auditory hallucination 10/4/2012       Work/Social History Reviewed With Patient 6/7/2018   My employment status is: Disabled   What is your marital status? Single   If in a relationship, is your significant other overweight? N/A   Do you have children? Yes   If you have children, are they overweight? Yes   Lives in a group home    Mental Health History Reviewed With Patient 6/7/2018   Have you ever been physically or sexually abused? No   How often in the past 2 weeks have you felt little interest or pleasure in doing things? Nearly Everyday   Over the past 2 weeks how often have you felt down, depressed, or hopeless? Nearly Everyday       Sleep History Reviewed With Patient 6/7/2018   How many hours do you sleep at night? 7   Do you think that you snore loudly or has anybody ever heard you snore loudly (louder than talking or so loud it can be heard behind a shut door)? Yes   Has anyone seen or heard you stop breathing during your sleep? No   Do you often feel tired, fatigued, or sleepy during the day? Yes       MEDICATIONS:   Current Outpatient Prescriptions   Medication Sig Dispense Refill     acetaminophen (TYLENOL) 500 MG tablet Take 2 tablets (1,000 mg) by mouth 3 times daily as needed for mild pain 100 tablet 0     ACETAMINOPHEN PO Take 1,000  mg by mouth every 6 hours as needed for pain or fever       albuterol (PROAIR HFA/PROVENTIL HFA/VENTOLIN HFA) 108 (90 BASE) MCG/ACT Inhaler Inhale 2 puffs into the lungs every 6 hours as needed for shortness of breath / dyspnea or wheezing 3 Inhaler 1     aspirin 81 MG EC tablet TAKE 1 TABLET (81MG) BY MOUTH DAILY 28 tablet 3     blood glucose monitoring (ONE TOUCH DELICA) lancets Use to test blood sugar 2 times daily or as directed.  Ok to substitute alternative if insurance prefers. 150 each 11     blood glucose monitoring (ONETOUCH VERIO IQ) test strip Use to test blood sugar 4 times daily .  Ok to substitute alternative if insurance prefers. 200 strip 11     [START ON 6/9/2018] gabapentin (NEURONTIN) 300 MG capsule TAKE 2 CAPSULES (600MG) BY MOUTH THREE TIMES A  capsule 0     ibuprofen (ADVIL,MOTRIN) 600 MG tablet Take 1 tablet (600 mg) by mouth every 4 hours as needed for moderate pain 60 tablet 0     insulin aspart (NOVOLOG FLEXPEN) 100 UNIT/ML injection Inject 20 Units Subcutaneous 3 times daily (with meals) Once daily, can add additional 5 units if BGs are >500mg/dL. 15 mL 11     insulin glargine (LANTUS) 100 UNIT/ML injection Inject 48 Units Subcutaneous At Bedtime 3 mL 11     insulin pen needle (NOVOFINE 30) 30G X 8 MM USE 4 DAILY OR AS DIRECTED 400 each 0     losartan (COZAAR) 100 MG tablet TAKE 1 TABLET (100MG) BY MOUTH DAILY 28 tablet 3     melatonin 5 MG CAPS Take 2 capsules by mouth daily At dinnertime 180 capsule 3     metoprolol succinate (TOPROL-XL) 25 MG 24 hr tablet Take 1 tablet (25 mg) by mouth daily 30 tablet 1     order for DME Equipment being ordered: 4 Wheeled walker with seat and brakes. 1 each 0     order for DME Diabetic Shoes 2 each 0     order for DME Hold Novolog if meals are refused. 1 each 0     paliperidone (INVEGA) 9 MG 24 hr tablet Take 1 tablet (9 mg) by mouth every morning 30 tablet 3     senna-docusate (SENOKOT-S;PERICOLACE) 8.6-50 MG per tablet Take 1 tablet by mouth 2  times daily as needed for constipation       sertraline (ZOLOFT) 100 MG tablet Take 1.5 tablets (150 mg) by mouth daily 60 tablet 1     TRULICITY 1.5 MG/0.5ML pen INJECT 1.5MG SUBCUTANEOUSLY EVERY SEVEN DAYS 2 mL 0     benztropine (COGENTIN) 0.5 MG tablet Take 1 tablet (0.5 mg) by mouth 2 times daily 60 tablet 1     calcium carbonate (OS-ALLEN 600 MG [place]. [place]) 1500 (600 [place]) MG tablet Take 1 tablet (1,500 mg) by mouth daily (Patient not taking: Reported on 6/7/2018) 180 tablet 3     clotrimazole (LOTRIMIN) 1 % cream Apply topically 2 times daily       DiphenhydrAMINE HCl (DIPHENDRYL PO) Take 25 mg by mouth every 6 hours as needed for itching       glucose 4 G CHEW chewable tablet Take 2 every 15 minutes for blood sugar <70mg/dL. Recheck blood sugar every 15 minutes until above 70mg/dL, then eat a substantial meal. (Patient not taking: Reported on 6/7/2018) 20 tablet 1     polyethylene glycol (MIRALAX/GLYCOLAX) powder TAKE 8.5 GRAMS (1/2 CAPFUL) BY MOUTH DAILY (Patient not taking: Reported on 6/7/2018) 527 g 3     prochlorperazine (COMPAZINE) 5 MG tablet Take 1 tablet (5 mg) by mouth every 6 hours as needed for nausea or vomiting (Patient not taking: Reported on 6/7/2018) 90 tablet 0     ranitidine (ZANTAC) 300 MG tablet TAKE 1 TABLET (300MG) BY MOUTH AT BEDTIME (Patient not taking: Reported on 6/7/2018) 90 tablet 1     SUMAtriptan (IMITREX) 25 MG tablet Take 1-2 tablets (25-50 mg) by mouth at onset of headache for migraine May repeat in 2 hours. Max 8 tablets/24 hours. (Patient not taking: Reported on 6/7/2018) 12 tablet 1     vitamin D3 (CHOLECALCIFEROL) 15821 UNITS capsule TAKE 1 CAPSULE (50,000 UNITS) MONTHLY (Patient not taking: Reported on 6/7/2018) 12 capsule 1     [DISCONTINUED] benztropine (COGENTIN) 0.5 MG tablet Take 1 tablet (0.5 mg) by mouth 2 times daily 60 tablet 1       ALLERGIES:   Allergies   Allergen Reactions     Imidazole Antifungals Hives     Tolerates diflucan     Ketoprofen Itching     Pruritis  "to topical     Lisinopril Hives     Metformin Other (See Comments)     Patient hospitalized for lactic acidosis - admitting provider suspectd caused by metformin     Metronidazole Hives     Posaconazole Hives     Tolerates diflucan       PHYSICAL EXAM:  /89  Pulse 80  Ht 5' 0.63\"  Wt 237 lb 4.8 oz  LMP 01/06/2015  SpO2 97%  BMI 45.39 kg/m2   A & O x 3  HEENT: NCAT, mucous membranes moist  Respirations unlabored  Location of obesity: [place] [facility]    ASSESSMENT:  Nena is a patient with mature onset obesity with significant element of familial/genetic influence and with current health consequences. She does need aggressive weight loss plan due to BMI 45.  Nena Tang eats a high carb diet, eats a high fat diet, uses food as a reward and uses food as mood management.    Her problem is complicated by a hunger disorder, strong craving/reward pathways, mental health/psychopharmacological barriers and gender and short stature    Her ability to lose weight is impacted by functional impairment, inability to perceive that food intake is at a level that prevents weight loss, misinformation and strongly held beliefs about food, lack of education on nutrition and dietary needs and socioeconomic situation.    PLAN:      Start topiramate ramp to 75 mg    Follow up in 3-4 months with Debbie Germain    MEDICATION STARTED AT THIS APPOINTMENT  We are starting topiramate at bedtime.  Start one tab, 25 mg, for a week. Go up to 50 mg (2 tabs) for the next week. At the third week, take   3 tabs (75 mg).  Stay at 3 tabs until you are seen again. Call the nurse at [phone] if you have any questions or concerns. (Do not stop taking it if you don't think it's working. For some people it works even though they do not feel much different.)    Topiramate (Topamax) is a medication that is used most often to treat migraine headaches or for seizures. It has also been found to help with weight loss. Although it's not " currently FDA approved for weight loss, it has been used safely for a number of years to help people who are carrying extra weight.     Just how topiramate helps with weight loss has not been exactly determined. However it seems to work on areas of the brain to quiet down signals related to eating.      Topiramate may make you:    >feel less interest in eating in between meals   >think less about food and eating   >find it easier to push the plate away   >find giving up pop easier    >have an easier time eating less    For some of our patients, the pills work right away. They feel and think quite differently about food. Other patients don't feel much of a change but find in fact they have lost weight! Like all weight loss medications, topiramate works best when you help it work.  This means:    1) Have less tempting high calorie (fattening) food around the house or office    2) Have lower calorie food (fruits, vegetables,low fat meats and dairy) for snacks    3) Eat out only one time or less each week.   4) Eat your meals at a table with the TV or computer off.    Side-effects. Topiramate is generally well tolerated. The main side-effects we see are:   Tingling in hands,feet, or face (usually not very troublesome)   Mental confusion and word finding trouble (about 10% of patients have this.)     Feeling sleepy or a bit dopey- this goes away very soon after starting.    One of the dangers of topiramate is the possibility of birth defects--if you get pregnant when you are on it, there is the risk that your baby will be born with a cleft lip or palate.  If you are on topiramate and of child bearing age, you need to be on a reliable form of birth control or refrain from sexual intercourse.     Please refer to the pharmacy insert for more information on side-effects. Since many pharmacists are not familiar with the use of topiramate in weight loss, calling the clinic will get you the most accurate information on the use  of this medication for weight loss.     In order to get refills of this or any medication we prescribe you must be seen in the medical weight mgmt clinic every 2-3 months. Please have your pharmacy fax a refill request to [phone].      RTC:    12 weeks.    TIME: 30 min spent on evaluation, management, counseling, education, & motivational interviewing with greater than 50 % of the total time was spent on counseling and coordinating care    Sincerely,    Debbie Germain PA-C

## 2018-06-07 NOTE — LETTER
"2018       RE: Nena Tang  [address] [facility] [address]     Dear Colleague,    Thank you for referring your patient, Nena Tang, to the [facility] MEDICAL WEIGHT MANAGEMENT at [facility]. Please see a copy of my visit note below.    New Medical Weight Management Consult    PATIENT:  Nena Tang  MRN:         [id number]  :         1960  GINNY:         2018    Dear Rowena Haas,    I had the pleasure of seeing your patient, Nena Tang.  Full intake/assessment done to determine barriers to weight loss success and develop a treatment plan.  Nena Tang is a 58 year old female interested in treatment of medical problems associated with weight.  Her weight today is 237 lbs 4.8 oz, Body mass index is 45.39 kg/(m^2)., and she has the following co-morbidities:     2018   I have the following co-morbidities associated with obesity: Type II Diabetes, Pre-Diabetes, Heart Disease, High Blood Pressure, High Cholesterol     Lantus 48 units at bedtime and novolog 20 units TID with meals, Trulicity  Dx with Type II DM over 20 yrs ago  Last A1C 6.2    Schizoaffective disorder diagnosed 20 years ago with weight gain from medications.     Patient Goals Reviewed With Patient 2018   I am interested in attaining a healthier weight to diminish current health problems related to co-morbid conditions: Yes   I am interested in attaining a healthier weight in order to prevent future health problems: Yes       Referring Provider 2018   Please name the provider who referred you to Medical Weight Management.  If you do not know, please answer: \"I Don't Know\". [email or web address]       Wt Readings from Last 4 Encounters:   18 237 lb 4.8 oz   18 235 lb   18 230 lb   18 236 lb       Weight History Reviewed With Patient 2018   How concerned are you about your weight? Very Concerned   Would you describe your weight gain as " gradual? Yes   I became overweight: As an Adult   The following factors have contributed to my weight gain:  A Health Crisis/Stress   I have tried the following methods to lose weight: Exercise   I have the following family history of obesity/being overweight:  One or more of my siblings are overweight   Has anyone in your family had weight loss surgery? No       Diet Recall Reviewed With Patient 6/7/2018   How many glasses of juice do you drink in a typical day? 2   How many of glasses of milk do you drink in a typical day? 1   How many 8oz glasses of sugar containing drinks such as [employer]/sweet tea do you drink in a day? 2   How many cans/bottles of sugar pop/soda/tea/sports drinks do you drink in a day? 2   How many cans/bottles of diet pop/soda/tea or sports drink do you drink in a day? 2   How often do you have a drink of alcohol? Never       Eating Habits Reviewed With Patient 6/7/2018   Generally, my meals include foods like these: bread, pasta, rice, potatoes, corn, crackers, sweet dessert, pop, or juice. Everyday   Generally, my meals include foods like these: fried meats, brats, burgers, french fries, pizza, cheese, chips, or ice cream. Everyday   Eat fast food (like [employer], [employer], [employer]). Almost Everyday   Eat at a buffet or sit-down restaurant. Half of the Week   Eat most of my meals in front of the TV or computer. Never   Often skip meals, eat at random times, have no regular eating times. Half of the Week   Eat extra snacks between meals. Almost Everyday   Eat most of my food at the end of the day. Half of the Week   Eat in the middle of the night or wake up at night to eat. Never   Eat extra snacks to prevent or correct low blood sugar. Almost Everyday   Eat to prevent acid reflux or stomach pain. Almost Everyday   Worry about not having enough food to eat. Never   Have you been to the food shelf at least a few times this year? No   I eat when I am depressed, stressed, anxious, or  bored. Almost Everyday   I eat when I am happy or as a reward. Almost Everyday   I feel hungry all the time even if I just have eaten. Almost Everyday   Feeling full is important to me. Almost Everyday   Once I start eating, it is hard to stop. Almost Everyday   I finish all the food on my plate even if I am already full. Almost Everyday   I can't resist eating delicious food or walk past the good food/smell. Everyday   I eat/snack without noticing that I am eating. Everyday   I eat when I am preparing the meal. Never   I eat more than usual when I see others eating. Almost Everyday   I have trouble not eating sweets, ice cream, cookies, or chips if they are around the house. Almost Everyday   I think about food all day. Almost Everyday   What foods, if any, do you crave? Sweets/Candy/Chocolate   I feel out of control when eating. Everyday   I eat a large amount of food, like a loaf of bread, a box of cookies, a pint/quart of ice cream, all at once. Never   I eat a large amount of food even when I am not hungry. Never   I eat rapidly. Everyday   I eat alone because I feel embarrassed and do not want others to see how much I have eaten. Everyday   I eat until I am uncomfortably full. Everyday   I feel bad, disgusted, or guilty after I overeat. Almost Everyday   I make myself vomit what I have eaten or use laxatives to get rid of food. Never       Activity/Exercise History Reviewed With Patient 6/7/2018   How much of a typical 12 hour day do you spend sitting? Most of the Day   How much of a typical 12 hour day do you spend lying down? Half the Day   How much of a typical day do you spend walking/standing? Half the Day   How many hours (not including work) do you spend on the TV/Video Games/Computer/Tablet/Phone? 6 Hours or More   How many times a week are you active for the purpose of exercise? Never   How many total minutes do you spend doing some activity for the purpose of exercising when you exercise? None   What  keeps you from being more active? Pain, Shortness of Breath, Too tired, Worried People Will Look At Me       PAST MEDICAL HISTORY:  Past Medical History:   Diagnosis Date     CAD (coronary artery disease)     5/2014 cath, nonbostructive stenosis to LAD, RCA.     Chronic low back pain 1/22/2013     Cocaine abuse, in remission      Fecal urgency 3/8/2012     History of heroin abuse      Hyperlipidemia LDL goal <100 10/31/2010     Hypertension 7/29/2013     Illiterate 8/30/2011     Irritable bowel syndrome      Left cataract      Migraine 4/19/2012     Migraine headache 4/22/2013     Moderate major depression (H) 6/8/2011     Noncompliance with medication regimen 6/8/2011     Obesity      CINDY (obstructive sleep apnea) 3/8/2012    uses CPAP     Osteopenia 10/7/2009     Schizoaffective disorder, depressive type (H) 2/25/2013     Suicidal intent 10/2/2013     Takotsubo cardiomyopathy      Type 2 diabetes mellitus (H) 8/30/2011     Uterine cancer (H) 1983     Verbal auditory hallucination 10/4/2012       Work/Social History Reviewed With Patient 6/7/2018   My employment status is: Disabled   What is your marital status? Single   If in a relationship, is your significant other overweight? N/A   Do you have children? Yes   If you have children, are they overweight? Yes   Lives in a group home    Mental Health History Reviewed With Patient 6/7/2018   Have you ever been physically or sexually abused? No   How often in the past 2 weeks have you felt little interest or pleasure in doing things? Nearly Everyday   Over the past 2 weeks how often have you felt down, depressed, or hopeless? Nearly Everyday       Sleep History Reviewed With Patient 6/7/2018   How many hours do you sleep at night? 7   Do you think that you snore loudly or has anybody ever heard you snore loudly (louder than talking or so loud it can be heard behind a shut door)? Yes   Has anyone seen or heard you stop breathing during your sleep? No   Do you often  feel tired, fatigued, or sleepy during the day? Yes       MEDICATIONS:   Current Outpatient Prescriptions   Medication Sig Dispense Refill     acetaminophen (TYLENOL) 500 MG tablet Take 2 tablets (1,000 mg) by mouth 3 times daily as needed for mild pain 100 tablet 0     ACETAMINOPHEN PO Take 1,000 mg by mouth every 6 hours as needed for pain or fever       albuterol (PROAIR HFA/PROVENTIL HFA/VENTOLIN HFA) 108 (90 BASE) MCG/ACT Inhaler Inhale 2 puffs into the lungs every 6 hours as needed for shortness of breath / dyspnea or wheezing 3 Inhaler 1     aspirin 81 MG EC tablet TAKE 1 TABLET (81MG) BY MOUTH DAILY 28 tablet 3     blood glucose monitoring (ONE TOUCH DELICA) lancets Use to test blood sugar 2 times daily or as directed.  Ok to substitute alternative if insurance prefers. 150 each 11     blood glucose monitoring (ONETOUCH VERIO IQ) test strip Use to test blood sugar 4 times daily .  Ok to substitute alternative if insurance prefers. 200 strip 11     [START ON 6/9/2018] gabapentin (NEURONTIN) 300 MG capsule TAKE 2 CAPSULES (600MG) BY MOUTH THREE TIMES A  capsule 0     ibuprofen (ADVIL,MOTRIN) 600 MG tablet Take 1 tablet (600 mg) by mouth every 4 hours as needed for moderate pain 60 tablet 0     insulin aspart (NOVOLOG FLEXPEN) 100 UNIT/ML injection Inject 20 Units Subcutaneous 3 times daily (with meals) Once daily, can add additional 5 units if BGs are >500mg/dL. 15 mL 11     insulin glargine (LANTUS) 100 UNIT/ML injection Inject 48 Units Subcutaneous At Bedtime 3 mL 11     insulin pen needle (NOVOFINE 30) 30G X 8 MM USE 4 DAILY OR AS DIRECTED 400 each 0     losartan (COZAAR) 100 MG tablet TAKE 1 TABLET (100MG) BY MOUTH DAILY 28 tablet 3     melatonin 5 MG CAPS Take 2 capsules by mouth daily At dinnertime 180 capsule 3     metoprolol succinate (TOPROL-XL) 25 MG 24 hr tablet Take 1 tablet (25 mg) by mouth daily 30 tablet 1     order for DME Equipment being ordered: 4 Wheeled walker with seat and brakes. 1  each 0     order for DME Diabetic Shoes 2 each 0     order for DME Hold Novolog if meals are refused. 1 each 0     paliperidone (INVEGA) 9 MG 24 hr tablet Take 1 tablet (9 mg) by mouth every morning 30 tablet 3     senna-docusate (SENOKOT-S;PERICOLACE) 8.6-50 MG per tablet Take 1 tablet by mouth 2 times daily as needed for constipation       sertraline (ZOLOFT) 100 MG tablet Take 1.5 tablets (150 mg) by mouth daily 60 tablet 1     TRULICITY 1.5 MG/0.5ML pen INJECT 1.5MG SUBCUTANEOUSLY EVERY SEVEN DAYS 2 mL 0     benztropine (COGENTIN) 0.5 MG tablet Take 1 tablet (0.5 mg) by mouth 2 times daily 60 tablet 1     calcium carbonate (OS-ALLEN 600 MG [place]. CA) 1500 (600 CA) MG tablet Take 1 tablet (1,500 mg) by mouth daily (Patient not taking: Reported on 6/7/2018) 180 tablet 3     clotrimazole (LOTRIMIN) 1 % cream Apply topically 2 times daily       DiphenhydrAMINE HCl (DIPHENDRYL PO) Take 25 mg by mouth every 6 hours as needed for itching       glucose 4 G CHEW chewable tablet Take 2 every 15 minutes for blood sugar <70mg/dL. Recheck blood sugar every 15 minutes until above 70mg/dL, then eat a substantial meal. (Patient not taking: Reported on 6/7/2018) 20 tablet 1     polyethylene glycol (MIRALAX/GLYCOLAX) powder TAKE 8.5 GRAMS (1/2 CAPFUL) BY MOUTH DAILY (Patient not taking: Reported on 6/7/2018) 527 g 3     prochlorperazine (COMPAZINE) 5 MG tablet Take 1 tablet (5 mg) by mouth every 6 hours as needed for nausea or vomiting (Patient not taking: Reported on 6/7/2018) 90 tablet 0     ranitidine (ZANTAC) 300 MG tablet TAKE 1 TABLET (300MG) BY MOUTH AT BEDTIME (Patient not taking: Reported on 6/7/2018) 90 tablet 1     SUMAtriptan (IMITREX) 25 MG tablet Take 1-2 tablets (25-50 mg) by mouth at onset of headache for migraine May repeat in 2 hours. Max 8 tablets/24 hours. (Patient not taking: Reported on 6/7/2018) 12 tablet 1     vitamin D3 (CHOLECALCIFEROL) 83636 UNITS capsule TAKE 1 CAPSULE (50,000 UNITS) MONTHLY (Patient not  "taking: Reported on 6/7/2018) 12 capsule 1     [DISCONTINUED] benztropine (COGENTIN) 0.5 MG tablet Take 1 tablet (0.5 mg) by mouth 2 times daily 60 tablet 1       ALLERGIES:   Allergies   Allergen Reactions     Imidazole Antifungals Hives     Tolerates diflucan     Ketoprofen Itching     Pruritis to topical     Lisinopril Hives     Metformin Other (See Comments)     Patient hospitalized for lactic acidosis - admitting provider suspectd caused by metformin     Metronidazole Hives     Posaconazole Hives     Tolerates diflucan       PHYSICAL EXAM:  /89  Pulse 80  Ht 5' 0.63\"  Wt 237 lb 4.8 oz  LMP 01/06/2015  SpO2 97%  BMI 45.39 kg/m2   A & O x 3  HEENT: NCAT, mucous membranes moist  Respirations unlabored  Location of obesity: [facility] Obesity    ASSESSMENT:  Nena is a patient with mature onset obesity with significant element of familial/genetic influence and with current health consequences. She does need aggressive weight loss plan due to BMI 45.  Nena Tang eats a high carb diet, eats a high fat diet, uses food as a reward and uses food as mood management.    Her problem is complicated by a hunger disorder, strong craving/reward pathways, mental health/psychopharmacological barriers and gender and short stature    Her ability to lose weight is impacted by functional impairment, inability to perceive that food intake is at a level that prevents weight loss, misinformation and strongly held beliefs about food, lack of education on nutrition and dietary needs and socioeconomic situation.    PLAN:      Start topiramate ramp to 75 mg    Follow up in 3-4 months with Debbie Germain    MEDICATION STARTED AT THIS APPOINTMENT  We are starting topiramate at bedtime.  Start one tab, 25 mg, for a week. Go up to 50 mg (2 tabs) for the next week. At the third week, take   3 tabs (75 mg).  Stay at 3 tabs until you are seen again. Call the nurse at [phone] if you have any questions or concerns. (Do not " stop taking it if you don't think it's working. For some people it works even though they do not feel much different.)    Topiramate (Topamax) is a medication that is used most often to treat migraine headaches or for seizures. It has also been found to help with weight loss. Although it's not currently FDA approved for weight loss, it has been used safely for a number of years to help people who are carrying extra weight.     Just how topiramate helps with weight loss has not been exactly determined. However it seems to work on areas of the brain to quiet down signals related to eating.      Topiramate may make you:    >feel less interest in eating in between meals   >think less about food and eating   >find it easier to push the plate away   >find giving up pop easier    >have an easier time eating less    For some of our patients, the pills work right away. They feel and think quite differently about food. Other patients don't feel much of a change but find in fact they have lost weight! Like all weight loss medications, topiramate works best when you help it work.  This means:    1) Have less tempting high calorie (fattening) food around the house or office    2) Have lower calorie food (fruits, vegetables,low fat meats and dairy) for snacks    3) Eat out only one time or less each week.   4) Eat your meals at a table with the TV or computer off.    Side-effects. Topiramate is generally well tolerated. The main side-effects we see are:   Tingling in hands,feet, or face (usually not very troublesome)   Mental confusion and word finding trouble (about 10% of patients have this.)     Feeling sleepy or a bit dopey- this goes away very soon after starting.    One of the dangers of topiramate is the possibility of birth defects--if you get pregnant when you are on it, there is the risk that your baby will be born with a cleft lip or palate.  If you are on topiramate and of child bearing age, you need to be on a  reliable form of birth control or refrain from sexual intercourse.     Please refer to the pharmacy insert for more information on side-effects. Since many pharmacists are not familiar with the use of topiramate in weight loss, calling the clinic will get you the most accurate information on the use of this medication for weight loss.     In order to get refills of this or any medication we prescribe you must be seen in the medical weight mgmt clinic every 2-3 months. Please have your pharmacy fax a refill request to [phone].      RTC:    12 weeks.    TIME: 30 min spent on evaluation, management, counseling, education, & motivational interviewing with greater than 50 % of the total time was spent on counseling and coordinating care    Sincerely,    Debbie Germain PA-C

## 2018-06-07 NOTE — MR AVS SNAPSHOT
After Visit Summary   6/7/2018    Nena Tang    MRN: [id number]           Patient Information     Date Of Birth          1960        Visit Information        Provider Department      6/7/2018 4:15 PM Debbie Germain PA-C [facility] Medical Weight Management        Today's Diagnoses     Morbid obesity (H)    -  1      Care Instructions    See Debbie in 3-4 months for return MWM    Start topiramate ramp to 75mg    MEDICATION STARTED AT THIS APPOINTMENT  We are starting topiramate at bedtime.  Start one tab, 25 mg, for a week. Go up to 50 mg (2 tabs) for the next week. At the third week, take   3 tabs (75 mg).  Stay at 3 tabs until you are seen again. Call the nurse at [phone] if you have any questions or concerns. (Do not stop taking it if you don't think it's working. For some people it works even though they do not feel much different.)    Topiramate (Topamax) is a medication that is used most often to treat migraine headaches or for seizures. It has also been found to help with weight loss. Although it's not currently FDA approved for weight loss, it has been used safely for a number of years to help people who are carrying extra weight.     Just how topiramate helps with weight loss has not been exactly determined. However it seems to work on areas of the brain to quiet down signals related to eating.      Topiramate may make you:    >feel less interest in eating in between meals   >think less about food and eating   >find it easier to push the plate away   >find giving up pop easier    >have an easier time eating less    For some of our patients, the pills work right away. They feel and think quite differently about food. Other patients don't feel much of a change but find in fact they have lost weight! Like all weight loss medications, topiramate works best when you help it work.  This means:    1) Have less tempting high calorie (fattening) food around the house or office     2) Have lower calorie food (fruits, vegetables,low fat meats and dairy) for snacks    3) Eat out only one time or less each week.   4) Eat your meals at a table with the TV or computer off.    Side-effects. Topiramate is generally well tolerated. The main side-effects we see are:   Tingling in hands,feet, or face (usually not very troublesome)   Mental confusion and word finding trouble (about 10% of patients have this.)     Feeling sleepy or a bit dopey- this goes away very soon after starting.    One of the dangers of topiramate is the possibility of birth defects--if you get pregnant when you are on it, there is the risk that your baby will be born with a cleft lip or palate.  If you are on topiramate and of child bearing age, you need to be on a reliable form of birth control or refrain from sexual intercourse.     Please refer to the pharmacy insert for more information on side-effects. Since many pharmacists are not familiar with the use of topiramate in weight loss, calling the clinic will get you the most accurate information on the use of this medication for weight loss.     In order to get refills of this or any medication we prescribe you must be seen in the medical weight mgmt clinic every 2-3 months. Please have your pharmacy fax a refill request to [phone].                  Follow-ups after your visit        Follow-up notes from your care team     Return in 3 months (on 9/7/2018).      Your next 10 appointments already scheduled     Jun 08, 2018  3:00 PM CDT   Return Visit with Richardson Lopez [facility] ([facility])    [address]  [address]  [facility] [address]   [phone]            Jun 08, 2018  3:30 PM CDT   Return Visit with Kraig Pedersen MD   [facility] ([facility])    [address]  [facility] [address]   [phone]            Jun 13,  2018 10:00 AM CDT   Ortho Treatment with Kiko Gallego, PT   [facility] ([facility])    [address]  [facility] [address]   [phone]            Jun 18, 2018  3:30 PM CDT   RETURN RETINA with Tiburcio Chacon MD   [facility] Clinic ([facility])    [address]  [address]  [facility] [address]   [phone]            Jun 20, 2018 10:00 AM CDT   Ortho Treatment with Kiko Gallego, PT   [facility] ([facility])    [address]  [facility] [address]   [phone]            Jun 22, 2018  1:00 PM CDT   Return Visit with ART Arias CNP   [facility] ([facility])    [address]  [address]  [facility] [address] [phone]            Jun 22, 2018  1:00 PM CDT   Return Visit with BIN Mondragon   [facility] ([facility])    [address]  [address]  [facility] [address] [phone]            Jul 17, 2018 10:30 AM CDT   Return Visit with ART rAias CNP   [facility] ([facility])    [address]  [address]  [facility] [address] [phone]            Jul 17, 2018 10:30 AM CDT   Return Visit with BIN Mondragon   [facility] ([facility])    [address]  [address]  [facility] [address]   [phone]              Who to contact     Please call your clinic at [phone] to:    Ask questions about your health    Make or cancel appointments    Discuss your medicines    Learn about your test results    Speak to your doctor            Additional Information About Your Visit        MyChart Information     MyChart gives you secure access to your  "electronic health record. If you see a primary care provider, you can also send messages to your care team and make appointments. If you have questions, please call your primary care clinic.  If you do not have a primary care provider, please call [phone] and they will assist you.      "[employer] is an electronic gateway that provides easy, online access to your medical records. With "[employer] you can request a clinic appointment, read your test results, renew a prescription or communicate with your care team.     To access your existing account, please contact your [facility] or call [phone] for assistance.        Care EveryWhere ID     This is your Care EveryWhere ID. This could be used by other organizations to access your [facility] medical records  [id number]        Your Vitals Were     Pulse Height Last Period Pulse Oximetry BMI (Body Mass Index)       80 5' 0.63\" 01/06/2015 97% 45.39 kg/m2        Blood Pressure from Last 3 Encounters:   06/07/18 157/89   06/04/18 94/58   05/28/18 129/70    Weight from Last 3 Encounters:   06/07/18 237 lb 4.8 oz   06/04/18 235 lb   05/28/18 230 lb              Today, you had the following     No orders found for display         Today's Medication Changes          These changes are accurate as of 6/7/18  4:59 PM.  If you have any questions, ask your nurse or doctor.               Start taking these medicines.        Dose/Directions    topiramate 25 MG tablet   Commonly known as:  TOPAMAX   Used for:  Morbid obesity (H)   Started by:  Debbie Germain PA-C        25mg at bedtime for week 1, 50mg at bedtime for 1 week, and 75mg at bedtime thereafter   Quantity:  90 tablet   Refills:  3            Where to get your medicines      These medications were sent to [address] [address]     Phone:  [phone]     benztropine 0.5 MG tablet    " topiramate 25 MG tablet                Primary Care Provider Office Phone # Fax #    ART Arias [phone]       [address] [address]        Goals        General    Medical (pt-stated)     Notes - Note created  7/7/2016  9:15 AM by Chelsea Pulido RN    Get blood sugars under control    Improve medication compliance    As of today's date 7/7/2016 goal is met at 0 - 25%.   Goal Status:  Active          Equal Access to Services     [facility]: Hadii aad ku hadasho Soomaali, waaxda luqadaha, qaybta kaalmada adeegyada, waxay idiin hayaan adeeg kharash laLuzaafredi . So [facility] [phone].    ATENCIÓN: Si habla español, tiene a trinh disposición servicios gratuitos de asistencia lingüística. Llame al [phone].    We comply with applicable federal civil rights laws and [place] laws. We do not discriminate on the basis of race, color, national origin, age, disability, sex, sexual orientation, or gender identity.            Thank you!     Thank you for choosing [facility] MEDICAL WEIGHT MANAGEMENT  for your care. Our goal is always to provide you with excellent care. Hearing back from our patients is one way we can continue to improve our services. Please take a few minutes to complete the written survey that you may receive in the mail after your visit with us. Thank you!             Your Updated Medication List - Protect others around you: Learn how to safely use, store and throw away your medicines at www.disposemymeds.org.          This list is accurate as of 6/7/18  4:59 PM.  Always use your most recent med list.                   Brand Name Dispense Instructions for use Diagnosis    * ACETAMINOPHEN PO      Take 1,000 mg by mouth every 6 hours as needed for pain or fever        * acetaminophen 500 MG tablet    TYLENOL    100 tablet    Take 2 tablets (1,000 mg) by mouth 3 times daily as needed for mild pain    Chronic low back pain, unspecified back pain  laterality, with sciatica presence unspecified       albuterol 108 (90 Base) MCG/ACT Inhaler    PROAIR HFA/PROVENTIL HFA/VENTOLIN HFA    3 Inhaler    Inhale 2 puffs into the lungs every 6 hours as needed for shortness of breath / dyspnea or wheezing    Dyspnea on exertion       aspirin 81 MG EC tablet     28 tablet    TAKE 1 TABLET (81MG) BY MOUTH DAILY    Coronary artery disease involving native heart with angina pectoris, unspecified vessel or lesion type (H)       benztropine 0.5 MG tablet    COGENTIN    60 tablet    Take 1 tablet (0.5 mg) by mouth 2 times daily    Extrapyramidal and movement disorder       blood glucose monitoring lancets     150 each    Use to test blood sugar 2 times daily or as directed.  Ok to substitute alternative if insurance prefers.    Type 2 diabetes mellitus with diabetic neuropathy, with long-term current use of insulin (H)       blood glucose monitoring test strip    ONETOUCH VERIO IQ    200 strip    Use to test blood sugar 4 times daily .  Ok to substitute alternative if insurance prefers.    Type 2 diabetes mellitus with diabetic neuropathy, with long-term current use of insulin (H)       calcium carbonate 1500 (600 Ca) MG tablet    OS-ALLEN 600 mg [place]. Ca    180 tablet    Take 1 tablet (1,500 mg) by mouth daily    Other osteoporosis without current pathological fracture       clotrimazole 1 % cream    LOTRIMIN     Apply topically 2 times daily        DIPHENDRYL PO      Take 25 mg by mouth every 6 hours as needed for itching        gabapentin 300 MG capsule   Start taking on:  6/9/2018    NEURONTIN    168 capsule    TAKE 2 CAPSULES (600MG) BY MOUTH THREE TIMES A DAY    Type 2 diabetes mellitus with diabetic neuropathy, with long-term current use of insulin (H)       glucose 4 g Chew chewable tablet     20 tablet    Take 2 every 15 minutes for blood sugar <70mg/dL. Recheck blood sugar every 15 minutes until above 70mg/dL, then eat a substantial meal.    Type 2 diabetes mellitus with  mild nonproliferative retinopathy without macular edema, with long-term current use of insulin, unspecified laterality (H)       ibuprofen 600 MG tablet    ADVIL/MOTRIN    60 tablet    Take 1 tablet (600 mg) by mouth every 4 hours as needed for moderate pain    Closed fracture of one rib of right side, initial encounter       insulin aspart 100 UNIT/ML injection    NovoLOG FLEXPEN    15 mL    Inject 20 Units Subcutaneous 3 times daily (with meals) Once daily, can add additional 5 units if BGs are >500mg/dL.    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, with long-term current use of insulin, unspecified laterality (H)       insulin pen needle 30G X 8 MM    NOVOFINE 30    400 each    USE 4 DAILY OR AS DIRECTED    Type 2 diabetes mellitus with mild nonproliferative retinopathy without macular edema, with long-term current use of insulin, unspecified laterality (H)       losartan 100 MG tablet    COZAAR    28 tablet    TAKE 1 TABLET (100MG) BY MOUTH DAILY    Coronary artery disease involving native heart with angina pectoris, unspecified vessel or lesion type (H)       melatonin 5 MG Caps     180 capsule    Take 2 capsules by mouth daily At dinnertime    Insomnia, unspecified type       metoprolol succinate 25 MG 24 hr tablet    TOPROL-XL    30 tablet    Take 1 tablet (25 mg) by mouth daily    Coronary artery disease involving native heart with angina pectoris, unspecified vessel or lesion type (H)       * order for DME     1 each    Hold Novolog if meals are refused.    Type 2 diabetes mellitus with mild nonproliferative retinopathy without macular edema, with long-term current use of insulin, unspecified laterality  (H)       * order for DME     2 each    Diabetic Shoes    Type 2 diabetes mellitus with mild nonproliferative retinopathy without macular edema, with long-term current use of insulin, unspecified laterality (H)       order for DME     1 each    Equipment being ordered: 4 Wheeled walker with seat and brakes.    Generalized muscle weakness       paliperidone 9 MG 24 hr tablet    INVEGA    30 tablet    Take 1 tablet (9 mg) by mouth every morning    Schizoaffective disorder, bipolar type (H)       polyethylene glycol powder    MIRALAX/GLYCOLAX    527 g    TAKE 8.5 GRAMS (1/2 CAPFUL) BY MOUTH DAILY    Constipation, unspecified constipation type       prochlorperazine 5 MG tablet    COMPAZINE    90 tablet    Take 1 tablet (5 mg) by mouth every 6 hours as needed for nausea or vomiting    Nausea       ranitidine 300 MG tablet    ZANTAC    90 tablet    TAKE 1 TABLET (300MG) BY MOUTH AT BEDTIME    Gastroesophageal reflux disease without esophagitis       senna-docusate 8.6-50 MG per tablet    SENOKOT-S;PERICOLACE     Take 1 tablet by mouth 2 times daily as needed for constipation        sertraline 100 MG tablet    ZOLOFT    60 tablet    Take 1.5 tablets (150 mg) by mouth daily    Schizoaffective disorder, bipolar type (H)       SUMAtriptan 25 MG tablet    IMITREX    12 tablet    Take 1-2 tablets (25-50 mg) by mouth at onset of headache for migraine May repeat in 2 hours. Max 8 tablets/24 hours.    Migraine with aura and without status migrainosus, not intractable       topiramate 25 MG tablet    TOPAMAX    90 tablet    25mg at bedtime for week 1, 50mg at bedtime for 1 week, and 75mg at bedtime thereafter    Morbid obesity (H)       TRULICITY 1.5 MG/0.5ML pen   Generic drug:  dulaglutide     2 mL    INJECT 1.5MG SUBCUTANEOUSLY EVERY SEVEN DAYS    Type 2 diabetes mellitus with mild nonproliferative retinopathy without macular edema, with long-term current use of insulin, unspecified laterality (H)       vitamin D3 52125 UNITS  capsule    CHOLECALCIFEROL    12 capsule    TAKE 1 CAPSULE (50,000 UNITS) MONTHLY    Vitamin D deficiency       * Notice:  This list has 4 medication(s) that are the same as other medications prescribed for you. Read the directions carefully, and ask your doctor or other care provider to review them with you.

## 2018-06-08 ENCOUNTER — OFFICE VISIT (OUTPATIENT)
Dept: PSYCHIATRY | Facility: CLINIC | Age: 58
End: 2018-06-08
Payer: MEDICARE

## 2018-06-08 ENCOUNTER — OFFICE VISIT (OUTPATIENT)
Dept: BEHAVIORAL HEALTH | Facility: CLINIC | Age: 58
End: 2018-06-08
Payer: MEDICARE

## 2018-06-08 DIAGNOSIS — K59.03 DRUG-INDUCED CONSTIPATION: Primary | ICD-10-CM

## 2018-06-08 DIAGNOSIS — G25.9 EXTRAPYRAMIDAL AND MOVEMENT DISORDER: ICD-10-CM

## 2018-06-08 DIAGNOSIS — F25.1 SCHIZOAFFECTIVE DISORDER, DEPRESSIVE TYPE (H): Primary | ICD-10-CM

## 2018-06-08 DIAGNOSIS — F43.10 POSTTRAUMATIC STRESS DISORDER: ICD-10-CM

## 2018-06-08 DIAGNOSIS — F25.0 SCHIZOAFFECTIVE DISORDER, BIPOLAR TYPE (H): ICD-10-CM

## 2018-06-08 PROCEDURE — 90833 PSYTX W PT W E/M 30 MIN: CPT | Performed by: PSYCHIATRY & NEUROLOGY

## 2018-06-08 PROCEDURE — 99214 OFFICE O/P EST MOD 30 MIN: CPT | Performed by: PSYCHIATRY & NEUROLOGY

## 2018-06-08 PROCEDURE — 90832 PSYTX W PT 30 MINUTES: CPT | Performed by: SOCIAL WORKER

## 2018-06-08 RX ORDER — SERTRALINE HYDROCHLORIDE 100 MG/1
200 TABLET, FILM COATED ORAL DAILY
Qty: 60 TABLET | Refills: 1 | Status: SHIPPED | OUTPATIENT
Start: 2018-06-08 | End: 2018-08-01

## 2018-06-08 RX ORDER — PALIPERIDONE 9 MG/1
9 TABLET, EXTENDED RELEASE ORAL EVERY MORNING
Qty: 30 TABLET | Refills: 3 | Status: SHIPPED | OUTPATIENT
Start: 2018-06-08 | End: 2018-08-31

## 2018-06-08 RX ORDER — AMOXICILLIN 250 MG
1 CAPSULE ORAL 2 TIMES DAILY PRN
Qty: 100 TABLET | Refills: 1 | Status: SHIPPED | OUTPATIENT
Start: 2018-06-08 | End: 2019-07-28

## 2018-06-08 RX ORDER — BENZTROPINE MESYLATE 0.5 MG/1
0.5 TABLET ORAL 2 TIMES DAILY
Qty: 60 TABLET | Refills: 1 | Status: SHIPPED | OUTPATIENT
Start: 2018-06-08 | End: 2018-08-29

## 2018-06-08 NOTE — PROGRESS NOTES
[facility]   June 8, 2018      Behavioral Health Clinician Progress Note    Patient Name: Nena Tang           Service Type:  Individual      Service Location:   Face to Face in Clinic     Session Start Time: 3:pm  Session End Time: 3:30pm      Session Length: 16 - 37      Attendees: Patient    Visit Activities (Refresh list every visit): [facility] Only    Diagnostic Assessment Date: 1-23-18  Treatment Plan Review Date: Not completed yet  See Flowsheets for today's PHQ-9 and TAMIA-7 results  Previous PHQ-9:   PHQ-9 SCORE 1/2/2017 2/3/2017 3/13/2018   Total Score - - -   Total Score - - -   Total Score 0 0 14     Previous TAMIA-7:   TAMIA-7 SCORE 1/2/2017 2/3/2017 3/13/2018   Total Score - - -   Total Score 0 0 17   Total Score - - -       ALEXANDRA LEVEL:  ALEXANDRA Score (Last Two) 8/30/2011 6/9/2014   ALEXANDRA Raw Score 42 37   Activation Score 66 49.9   ALEXANDRA Level 3 2       DATA  Extended Session (60+ minutes): No  Interactive Complexity: No  Crisis: No  [facility] Patient: No    Treatment Objective(s) Addressed in This Session:  Target Behavior(s): disease management/lifestyle changes mental health    Depressed Mood: Increase interest, engagement, and pleasure in doing things  Decrease frequency and intensity of feeling down, depressed, hopeless  Improve quantity and quality of night time sleep / decrease daytime naps  Feel less tired and more energy during the day   Identify negative self-talk and behaviors: challenge core beliefs, myths, and actions  Improve concentration, focus, and mindfulness in daily activities   Decrease thoughts that you'd be better off dead or of suicide / self-harm  Anxiety: will experience a reduction in anxiety, will develop more effective coping skills to manage anxiety symptoms, will develop healthy cognitive patterns and beliefs and will increase ability to function adaptively  Alcohol / Substance Use: continue to make healthy choices regarding substance use and engage in  "activities / supportive services that promote sobriety  Thought Disturbance: will develop skills to more effectively manage symptoms, will develop more effective support systems and will continue to take medications as prescribed    Current Stressors / Issues:    The patient reported that she continues to having hallucination and she stated that she is focused on \"being strong\" and as a result it is not bothering her as much-the control is in your hands-we talked about how she is able to control if the man harms her physically or mentally-we talked about the patient commanding the man to go away when she is bothered by him-she stated that she has a friend that told her this before-she stated that she is not afraid of the man because she is more powerful than she use to be-she stated that she talks to her TV and that her TV talks back to her-she watches crimes shows and she has nightmares and she is aware of how this form of TV but she likes this kind of shows-she is thinking to purchasing a CD player to listen to music-she has not been using her CPAP because she has the feeling that the man is trying the suffocate her and the result is that she is having hard time falling asleep and she waking during the night-remind self that she is safe and that her God is protecting her-she will make statement and we discussed ways to keep focused on her feeling safe and present-had recent suicidal thoughts no current suicidal thoughts with no intent to harm self-she has been worrying about her sister's health-her plan is to come to hospital if something bad happens to her sister (911 or get a ride.)  This writer encouraged the patient to take things in the present moment (day by day.)               Progress on Treatment Objective(s) / Homework:  Minimal progress - ACTION (Actively working towards change); Intervened by reinforcing change plan / affirming steps taken    Motivational Interviewing    MI Intervention: Expressed " Empathy/Understanding, Supported Autonomy, Collaboration, Evocation, Permission to raise concern or advise, Open-ended questions, Reflections: simple and complex, Rolled with resistance: Emphasized patient autonomy, Simple reflection and Evoked patient agenda and Change talk (evoked)     Change Talk Expressed by the Patient: Desire to change Ability to change Reasons to change Need to change Committment to change Activation Taking steps    Provider Response to Change Talk: E - Evoked more info from patient about behavior change, A - Affirmed patient's thoughts, decisions, or attempts at behavior change, R - Reflected patient's change talk and S - Summarized patient's change talk statements      Care Plan review completed: No    Medication Review:  No changes to current psychiatric medication(s)   Medication Compliance:  NA    Changes in Health Issues:   None reported     Chemical Use Review:   Substance Use: Chemical use reviewed, no active concerns identified      Tobacco Use: No current tobacco use.      Assessment: Current Emotional / Mental Status (status of significant symptoms):  Risk status (Self / Other harm or suicidal ideation)  Patient has had a history of suicidal ideation: yes  Patient denies current fears or concerns for personal safety.  Patient reports the following current or recent suicidal ideation or behaviors: no intent to harm the self at this time.  Patient denies current or recent homicidal ideation or behaviors.  Patient denies current or recent self injurious behavior or ideation.  Patient denies other safety concerns.  A safety and risk management plan has not been developed at this time, however patient was encouraged to call [facility] should there be a change in any of these risk factors.    Appearance:   Appropriate   Eye Contact:   Good   Psychomotor Behavior: Normal   Attitude:   Cooperative   Orientation:   All  Speech   Rate / Production: Normal    Volume:  Normal    Mood:    Normal  Affect:    Appropriate  Bright   Thought Content:  Clear   Thought Form:  Coherent   Insight:    Fair     Diagnoses:  1. Schizoaffective disorder, depressive type (H)    2. Posttraumatic stress disorder        Collateral Reports Completed:  Not Applicable    Plan: (Homework, other):  Patient was given information about behavioral services and encouraged to schedule a follow up appointment with the clinic [facility] as needed.  She was also given information about mental health symptoms and treatment options .  CD Recommendations: Maintain Sobriety.    BIN George, [facility]      ______________________________________________________________________

## 2018-06-08 NOTE — TELEPHONE ENCOUNTER
"Requested Prescriptions   Pending Prescriptions Disp Refills     dulaglutide (TRULICITY) 1.5 MG/0.5ML pen 2 mL 0    GLP-1 Agonists Protocol Failed    6/7/2018  1:58 PM       Failed - Normal serum creatinine on file in past 12 months    Recent Labs   Lab Test  05/28/18   1625   CR  1.16*            Passed - Blood pressure less than 140/90 in past 6 months    BP Readings from Last 3 Encounters:   06/07/18 157/89   06/04/18 94/58   05/28/18 129/70                Passed - LDL on file in past 12 months    Recent Labs   Lab Test  06/27/17   1358   LDL  64            Passed - Microalbumin on file in past 12 months    Recent Labs   Lab Test  06/27/17   1404   MICROL  11   UMALCR  6.81            Passed - HgbA1C in past 3 or 6 months    If HgbA1C is 8 or greater, it needs to be on file within the past 3 months.  If less than 8, must be on file within the past 6 months.     Recent Labs   Lab Test  05/22/18   1434   A1C  6.2*            Passed - Patient is age 18 or older       Passed - No active pregnancy on record       Passed - No positive pregnancy test in past 12 months       Passed - Recent (6 mo) or future (30 days) visit within the authorizing provider's specialty    Patient had office visit in the last 6 months or has a visit in the next 30 days with authorizing provider.  See \"Patient Info\" tab in inbasket, or \"Choose Columns\" in Meds & Orders section of the refill encounter.              "

## 2018-06-08 NOTE — PATIENT INSTRUCTIONS
- Increase Zoloft (Sertraline) to 200 mg per day.   - Continue other psychiatric medications.  24/7 Crisis Hotlines:    National Suicide Prevention Hotline   170-504-QYXB (5213)    Crisis Hotlines by County:    [place] Crisis Line     460.431.1820  [place] Crisis Line   [phone]  [facility] Crisis Line     [phone]  [place] COPE for Adults   [phone]  [place] for Children    [phone]  [place] for Adults    [phone]  [place] for Children    [phone]  [facility] Crisis Line    [phone]    [facility] Crisis Response Team  [phone] ([phone])  [facility] Crisis is available 24 hours a day. The team provides callers with a needs assessment and referrals to appropriate resources. If medically necessary, the Crisis Response Team assists individuals by traveling to their location to provide face-to-face interventions and assessments. Crisis services are available for adults and children in [facility] and [facility]. Adult Crisis beds are also available if appropriate.    Walk-In Centers and Mobile Teams  [facility] Walk-In Crisis Intervention  [phone]  [facility] COPE (18+) Crisis Mobile Team    [phone]  [facility] Child (under 17) Crisis Mobile Team [phone]  \[facility]\"" UrgentCare for Adults Walk-In & Mobile Teams [phone]    Additional Crisis Hotlines:  Bridge for Youth      383.680.8281  Crisis Connection      [phone]  EMACS ([facility] Crisis Services)  [phone]  [facility] Social Service (LSS)    [phone]  Men s Crisis Line      871-404-WOPF ([phone])  National Suicide Prevention Hotline   962-838-DROD (1971)  [facility] Crisis Line    104.987.6382  Suicide Hotline      182.560.3661  [facility] (formerly First Call for Help)  Dial 2-1-4 (774-275-4631)    Domestic Violence, Rape and Sexual Abuse Hotlines:  [place] Crisis Line      [phone]  [place] Helpline  [phone]  Domestic Abuse Project (DAP)    5-048-322-8454*  [place] Crisis Line     [phone]  [place] [facility]  [phone]*  [facility]       [phone] ([phone]*)  National Domestic Violence Hotline   2-080-772-MGJX  Rape/Sexual Abuse Center Crisis Line    [phone]   Sexual Violence Center (SVC)    [phone]  [facility] [employer]     [phone] ([phone]*)

## 2018-06-08 NOTE — MR AVS SNAPSHOT
After Visit Summary   6/8/2018    Nena Tang    MRN: [id number]           Patient Information     Date Of Birth          1960        Visit Information        Provider Department      6/8/2018 3:00 PM Richardson Lopez LICSW [facility] Primary Care        Today's Diagnoses     Schizoaffective disorder, depressive type (H)    -  1    Posttraumatic stress disorder           Follow-ups after your visit        Your next 10 appointments already scheduled     Jun 13, 2018 10:00 AM CDT   Ortho Treatment with Kiko Gallego PT   [facility] Physical Therapy ([facility])    [address] [address]   [phone]            Jun 18, 2018  3:30 PM CDT   RETURN RETINA with Tiburcio Chacon MD   Eye Clinic ([facility])    [address]  [facility] [address]   [phone]            Jun 20, 2018 10:00 AM CDT   Ortho Treatment with Kiko Gallego PT   [facility] ([facility])    [address] [address]   [phone]            Jun 22, 2018  1:00 PM CDT   Return Visit with ART Arias CNP   [facility] ([facility]    [address]  [facility] [address]   [phone]            Jun 22, 2018  1:00 PM CDT   Return Visit with BIN Mondragon   [facility] ([facility])    [address]  [address]  [facility] [address]   [phone]            Jul 17, 2018 10:30 AM CDT   Return Visit with ART Arias CNP   [facility] ([facility]    [address]  [facility] [address]   [phone]            Jul 17, 2018 10:30 AM CDT   Return Visit with BIN Mondragon    [facility] ([facility])    [address]  [facility] [address]   [phone]            Sep 07, 2018  3:00 PM CDT   Return Visit with Kraig Pedersen MD   [facility] ([facility])    [address]  [facility] [address]   [phone]            Sep 10, 2018 10:45 AM CDT   (Arrive by 10:30 AM)   Return Weight Management Visit with Debbie Germain PA-C   [facility] Weight Management ([facility] [place])    [address]  [address]th Floor  [facility] [address] [phone]              Who to contact     If you have questions or need follow up information about today's clinic visit or your schedule please contact [facility] PRIMARY CARE directly at [phone].  Normal or non-critical lab and imaging results will be communicated to you by Kinderminthart, letter or phone within 4 business days after the clinic has received the results. If you do not hear from us within 7 days, please contact the clinic through Leiyoot or phone. If you have a critical or abnormal lab result, we will notify you by phone as soon as possible.  Submit refill requests through Mind FactoryAR or call your pharmacy and they will forward the refill request to us. Please allow 3 business days for your refill to be completed.          Additional Information About Your Visit        MyChart Information     [employer] gives you secure access to your electronic health record. If you see a primary care provider, you can also send messages to your care team and make appointments. If you have questions, please call your primary care clinic.  If you do not have a primary care provider, please call [phone] and they will assist you.        Care EveryWhere ID     This is your Care EveryWhere ID. This could be used by other organizations to access your [facility]  records  [id number]        Your Vitals Were     Last Period                   01/06/2015            Blood Pressure from Last 3 Encounters:   06/07/18 157/89   06/04/18 94/58   05/28/18 129/70    Weight from Last 3 Encounters:   06/07/18 237 lb 4.8 oz (107.6 kg)   06/04/18 235 lb (106.6 kg)   05/28/18 230 lb (104.3 kg)              Today, you had the following     No orders found for display         Today's Medication Changes          These changes are accurate as of 6/8/18  4:19 PM.  If you have any questions, ask your nurse or doctor.               These medicines have changed or have updated prescriptions.        Dose/Directions    sertraline 100 MG tablet   Commonly known as:  ZOLOFT   This may have changed:  how much to take   Used for:  Schizoaffective disorder, bipolar type (H)   Changed by:  Kraig Pedersen MD        Dose:  200 mg   Take 2 tablets (200 mg) by mouth daily   Quantity:  60 tablet   Refills:  1            Where to get your medicines      These medications were sent to [address] [address]     Phone:  [phone]     benztropine 0.5 MG tablet    paliperidone 9 MG 24 hr tablet    senna-docusate 8.6-50 MG per tablet    sertraline 100 MG tablet                Primary Care Provider Office Phone # Fax #    Rowena ART Blanton [phone]       [address] [facility] [address] [place] [address]        Goals        General    Medical (pt-stated)     Notes - Note created  7/7/2016  9:15 AM by Chelsea Pulido, RN    Get blood sugars under control    Improve medication compliance    As of today's date 7/7/2016 goal is met at 0 - 25%.   Goal Status:  Active          Equal Access to Services     KIMBERLY GARRIDO : Azul Rios, wadeejayda luqadaha, qaybta kaalmada hossein, lorena martins. [facility] [phone].    ATENCIÓN: Si hunter doan  disposición servicios gratuitos de asistencia lingüística. Smith zacarias [phone].    We comply with applicable federal civil rights laws and [place] laws. We do not discriminate on the basis of race, color, national origin, age, disability, sex, sexual orientation, or gender identity.            Thank you!     Thank you for choosing [facility] PRIMARY CARE  for your care. Our goal is always to provide you with excellent care. Hearing back from our patients is one way we can continue to improve our services. Please take a few minutes to complete the written survey that you may receive in the mail after your visit with us. Thank you!             Your Updated Medication List - Protect others around you: Learn how to safely use, store and throw away your medicines at www.disposemymeds.org.          This list is accurate as of 6/8/18  4:19 PM.  Always use your most recent med list.                   Brand Name Dispense Instructions for use Diagnosis    * ACETAMINOPHEN PO      Take 1,000 mg by mouth every 6 hours as needed for pain or fever        * acetaminophen 500 MG tablet    TYLENOL    100 tablet    Take 2 tablets (1,000 mg) by mouth 3 times daily as needed for mild pain    Chronic low back pain, unspecified back pain laterality, with sciatica presence unspecified       albuterol 108 (90 Base) MCG/ACT Inhaler    PROAIR HFA/PROVENTIL HFA/VENTOLIN HFA    3 Inhaler    Inhale 2 puffs into the lungs every 6 hours as needed for shortness of breath / dyspnea or wheezing    Dyspnea on exertion       aspirin 81 MG EC tablet     28 tablet    TAKE 1 TABLET (81MG) BY MOUTH DAILY    Coronary artery disease involving native heart with angina pectoris, unspecified vessel or lesion type (H)       benztropine 0.5 MG tablet    COGENTIN    60 tablet    Take 1 tablet (0.5 mg) by mouth 2 times daily    Extrapyramidal and movement disorder       blood glucose monitoring lancets     150 each    Use to test blood sugar 2  times daily or as directed.  Ok to substitute alternative if insurance prefers.    Type 2 diabetes mellitus with diabetic neuropathy, with long-term current use of insulin (H)       blood glucose monitoring test strip    ONETOUCH VERIO IQ    200 strip    Use to test blood sugar 4 times daily .  Ok to substitute alternative if insurance prefers.    Type 2 diabetes mellitus with diabetic neuropathy, with long-term current use of insulin (H)       calcium carbonate 1500 (600 Ca) MG tablet    OS-ALLEN 600 mg [place] Ca    180 tablet    Take 1 tablet (1,500 mg) by mouth daily    Other osteoporosis without current pathological fracture       clotrimazole 1 % cream    LOTRIMIN     Apply topically 2 times daily        DIPHENDRYL PO      Take 25 mg by mouth every 6 hours as needed for itching        gabapentin 300 MG capsule   Start taking on:  6/9/2018    NEURONTIN    168 capsule    TAKE 2 CAPSULES (600MG) BY MOUTH THREE TIMES A DAY    Type 2 diabetes mellitus with diabetic neuropathy, with long-term current use of insulin (H)       glucose 4 g Chew chewable tablet     20 tablet    Take 2 every 15 minutes for blood sugar <70mg/dL. Recheck blood sugar every 15 minutes until above 70mg/dL, then eat a substantial meal.    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, with long-term current use of insulin, unspecified laterality (H)       losartan 100 MG tablet    COZAAR    28 tablet    TAKE 1 TABLET (100MG) BY MOUTH DAILY    Coronary artery disease involving native heart with angina pectoris, unspecified vessel or lesion type (H)       melatonin 5 MG Caps     180 capsule    Take 2 capsules by mouth daily At dinnertime    Insomnia, unspecified type       metoprolol succinate 25 MG 24 hr tablet    TOPROL-XL    30 tablet    Take 1 tablet (25 mg) by mouth daily    Coronary artery disease involving native heart with angina pectoris, unspecified vessel or lesion type (H)       * order for DME     1 each    Hold Novolog if meals are refused.    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    Generalized muscle weakness       paliperidone 9 MG 24 hr tablet    INVEGA    30 tablet    Take 1 tablet (9 mg) by mouth every morning    Schizoaffective disorder, bipolar type (H)       polyethylene glycol powder    MIRALAX/GLYCOLAX    527 g    TAKE 8.5 GRAMS (1/2 CAPFUL) BY MOUTH DAILY    Constipation, unspecified constipation type       prochlorperazine 5 MG tablet     COMPAZINE    90 tablet    Take 1 tablet (5 mg) by mouth every 6 hours as needed for nausea or vomiting    Nausea       ranitidine 300 MG tablet    ZANTAC    90 tablet    TAKE 1 TABLET (300MG) BY MOUTH AT BEDTIME    Gastroesophageal reflux disease without esophagitis       senna-docusate 8.6-50 MG per tablet    SENOKOT-S;PERICOLACE    100 tablet    Take 1 tablet by mouth 2 times daily as needed for constipation    Drug-induced constipation       sertraline 100 MG tablet    ZOLOFT    60 tablet    Take 2 tablets (200 mg) by mouth daily    Schizoaffective disorder, bipolar type (H)       SUMAtriptan 25 MG tablet    IMITREX    12 tablet    Take 1-2 tablets (25-50 mg) by mouth at onset of headache for migraine May repeat in 2 hours. Max 8 tablets/24 hours.    Migraine with aura and without status migrainosus, not intractable       topiramate 25 MG tablet    TOPAMAX    90 tablet    25mg at bedtime for week 1, 50mg at bedtime for 1 week, and 75mg at bedtime thereafter    Morbid obesity (H)       TRULICITY 1.5 MG/0.5ML pen   Generic drug:  dulaglutide     2 mL    INJECT 1.5MG SUBCUTANEOUSLY EVERY SEVEN DAYS    Type 2 diabetes mellitus with mild nonproliferative retinopathy without macular edema, with long-term current use of insulin, unspecified laterality (H)       vitamin D3 17390 UNITS capsule    CHOLECALCIFEROL    12 capsule    TAKE 1 CAPSULE (50,000 UNITS) MONTHLY    Vitamin D deficiency       * Notice:  This list has 4 medication(s) that are the same as other medications prescribed for you. Read the directions carefully, and ask your doctor or other care provider to review them with you.

## 2018-06-08 NOTE — MR AVS SNAPSHOT
MRN:[id number]                      After Visit Summary   6/8/2018    Nena Tang    MRN: [id number]           Visit Information        Provider Department      6/8/2018 3:30 PM Kraig Pedersen MD [facility] Primary Care        Your next 10 appointments already scheduled     Jun 13, 2018 10:00 AM CDT   Ortho Treatment with Kikoannabelle Gallego, PT   [facility] Physical Therapy ([facility])    [address] [facility] [address]   [phone]            Jun 18, 2018  3:30 PM CDT   RETURN RETINA with Tiburcio Chacon MD   Eye Clinic ([facility])    [address]  [address] [facility] [address]  [facility] [address]   [phone]            Jun 20, 2018 10:00 AM CDT   Ortho Treatment with Kiko Gallego, PT   [facility] Therapy ([facility])    [address] [facility] [address]   [phone]            Jun 22, 2018  1:00 PM CDT   Return Visit with ART Arias CNP   [facility] ([facility])    [address]  [facility] [address]   [phone]            Jun 22, 2018  1:00 PM CDT   Return Visit with BIN Mondragon   [facility] ([facility])    [address]  [address]  [facility] [address]   [phone]            Jul 17, 2018 10:30 AM CDT   Return Visit with ART Arias CNP   [facility] ([facility])    [address]  [facility] [address]   [phone]            Jul 17, 2018 10:30 AM CDT   Return Visit with BIN Mondragon   [facility] ([facility])    [address]  [facility] [address]   [phone]            Sep 10, 2018 10:45  AM CDT   (Arrive by 10:30 AM)   Return Weight Management Visit with Debbie Germain PA-C   [facility] Medical Weight Management ([facility] [place])    [address] [address] [phone]              Care Instructions    - Increase Zoloft (Sertraline) to 200 mg per day.   - Continue other psychiatric medications.  24/7 Crisis Hotlines:    National Suicide Prevention Hotline   798-907-XFCD (6352)    Crisis Hotlines by County:    [place] Crisis Line     [phone]  [place] Crisis Line   [phone]  [place] Crisis Line     [phone]  [place] COPE for Adults   [phone]  [place] for Children    [phone]  [place] for Adults    [phone]  [place] for Children    [phone]  [facility] Crisis Line    [phone]    [facility] Crisis Response Team  [phone] ([phone])  [facility] Crisis is available 24 hours a day. The team provides callers with a needs assessment and referrals to appropriate resources. If medically necessary, the Crisis Response Team assists individuals by traveling to their location to provide face-to-face interventions and assessments. Crisis services are available for adults and children in [facility] and [facility]. Adult Crisis beds are also available if appropriate.    Walk-In Centers and Mobile Teams  [facility] Acute Psychiatric Service Walk-In Crisis Intervention  [phone]  [facility] COPE (18+) Crisis Mobile Team    [phone]  [facility] Child (under 17) Crisis Mobile Team [phone]  [place] UrgentCare for Adults Walk-In & Mobile Teams [phone]    Additional Crisis Hotlines:  Bridge for Youth      445.857.6645  Crisis Connection      [phone]  EMACS ([facility] Crisis Services)  [phone]  [facility] Social Service (LSS)    [phone]  Men s Crisis Line      279-532-CQRR ([phone])  National Suicide Prevention Hotline    379-231-PVSP (2388)  [facility] Crisis Line    [phone]  Suicide Hotline      638.562.5308  [facility] (formerly First [facility] for Help)  Dial 2-1-10 (137-498-8669)    Domestic Violence, Rape and Sexual Abuse Hotlines:  [place] Crisis Line     [phone]  [place] Helpline  [phone]  Domestic Abuse Project (DAP)    7-312-235-1219*  [facility] Crisis Line     [phone]  [place] [facility] for [facility]  [phone]*  [facility]       [phone] ([phone]*)  National Domestic Violence Hotline   8-113-584-LOAZ  Rape/Sexual Abuse Center Crisis Line    [phone]   Sexual Violence Center (SVC)    [phone]  [facility] [employer]     [phone] ([phone]*)           YourPlacehart Information     "[employer] gives you secure access to your electronic health record. If you see a primary care provider, you can also send messages to your care team and make appointments. If you have questions, please call your primary care clinic.  If you do not have a primary care provider, please call [phone] and they will assist you.        Care EveryWhere ID     This is your Care EveryWhere ID. This could be used by other organizations to access your [facility] medical records  [id number]        Equal Access to Services     RAMESH GARRIDO : Hadii samira wadeo Sodelphine, waaxda luqadaha, qaybta kaalmada adeegyada, wax dorcas martins. So [facility] [phone].    ATENCIÓN: Si habla español, tiene a trinh disposición servicios gratuitos de asistencia lingüística. Llame al [phone].    We comply with applicable federal civil rights laws and [place] laws. We do not discriminate on the basis of race, color, national origin, age, disability, sex, sexual orientation, or gender identity.

## 2018-06-13 ENCOUNTER — TELEPHONE (OUTPATIENT)
Dept: FAMILY MEDICINE | Facility: CLINIC | Age: 58
End: 2018-06-13

## 2018-06-13 ENCOUNTER — HOSPITAL ENCOUNTER (OUTPATIENT)
Dept: PHYSICAL THERAPY | Facility: CLINIC | Age: 58
Setting detail: THERAPIES SERIES
End: 2018-06-13
Attending: NURSE PRACTITIONER
Payer: MEDICARE

## 2018-06-13 PROCEDURE — 97112 NEUROMUSCULAR REEDUCATION: CPT | Mod: GP | Performed by: PHYSICAL THERAPIST

## 2018-06-13 PROCEDURE — 97110 THERAPEUTIC EXERCISES: CPT | Mod: GP | Performed by: PHYSICAL THERAPIST

## 2018-06-13 PROCEDURE — 40000718 ZZHC STATISTIC PT DEPARTMENT ORTHO VISIT: Performed by: PHYSICAL THERAPIST

## 2018-06-13 NOTE — TELEPHONE ENCOUNTER
"Pt called very concerned about her BP being elevated. Pt stated that her most recent BP reading was 147/77. Pt said \"I would like some advice on what I should do since I know this is high\".     Pt call back #: [phone] (okay to leave a detailed message)    Sherly Villatoro      "

## 2018-06-13 NOTE — TELEPHONE ENCOUNTER
2nd call from pt about her B/P, pt states she would like a call back ASAP.      Giorgi Case

## 2018-06-13 NOTE — TELEPHONE ENCOUNTER
Writer called pt.  Pt stated that her BP medication was recently decreased.    Writer asked pt to check BP daily and bring log into next office visit.    Indra Le RN

## 2018-06-18 ENCOUNTER — OFFICE VISIT (OUTPATIENT)
Dept: OPHTHALMOLOGY | Facility: CLINIC | Age: 58
End: 2018-06-18
Attending: OPHTHALMOLOGY
Payer: MEDICARE

## 2018-06-18 DIAGNOSIS — E11.3213 TYPE 2 DIABETES MELLITUS WITH BOTH EYES AFFECTED BY MILD NONPROLIFERATIVE RETINOPATHY AND MACULAR EDEMA, WITH LONG-TERM CURRENT USE OF INSULIN (H): Primary | ICD-10-CM

## 2018-06-18 DIAGNOSIS — Z79.4 TYPE 2 DIABETES MELLITUS WITH BOTH EYES AFFECTED BY MILD NONPROLIFERATIVE RETINOPATHY AND MACULAR EDEMA, WITH LONG-TERM CURRENT USE OF INSULIN (H): Primary | ICD-10-CM

## 2018-06-18 PROCEDURE — 92134 CPTRZ OPH DX IMG PST SGM RTA: CPT | Mod: ZF | Performed by: OPHTHALMOLOGY

## 2018-06-18 PROCEDURE — G0463 HOSPITAL OUTPT CLINIC VISIT: HCPCS | Mod: ZF

## 2018-06-18 ASSESSMENT — TONOMETRY
OS_IOP_MMHG: 22
IOP_METHOD: TONOPEN
OD_IOP_MMHG: 22

## 2018-06-18 ASSESSMENT — CUP TO DISC RATIO
OD_RATIO: 0.3
OS_RATIO: 0.3

## 2018-06-18 ASSESSMENT — VISUAL ACUITY
OS_SC: 20/40
OD_SC: 20/40
METHOD: SNELLEN - LINEAR

## 2018-06-18 ASSESSMENT — EXTERNAL EXAM - LEFT EYE: OS_EXAM: NORMAL

## 2018-06-18 ASSESSMENT — SLIT LAMP EXAM - LIDS
COMMENTS: NORMAL
COMMENTS: NORMAL

## 2018-06-18 ASSESSMENT — EXTERNAL EXAM - RIGHT EYE: OD_EXAM: NORMAL

## 2018-06-18 ASSESSMENT — CONF VISUAL FIELD
OS_NORMAL: 1
OD_NORMAL: 1

## 2018-06-18 NOTE — NURSING NOTE
Chief Complaints and History of Present Illnesses   Patient presents with     Follow Up For     8 week follow up Type 2 diabetes mellitus      HPI    Affected eye(s):  Both   Symptoms:     Blurred vision   Floaters   Flashes   Glare   Halos      Duration:  8 weeks      Do you have eye pain now?:  No      Comments:  6 week follow up DM  Blood sugar 113 this AM, A1C 6.1 1 month ago

## 2018-06-18 NOTE — PROGRESS NOTES
I have confirmed the patient's CC, HPI and reviewed Past Medical History, Past Surgical History, Social History, Family History, Problem List, Medication List and agree with Tech note.    CC: Follow up Diabetes mellitus retinopathy    Interval history: VA seems blurrier both eyes. S/p focal laser OS with Dr. Payan at last visit 4/25/18. Marcy flashes or new floaters.    HPI: 57YO F Hx of DMII here for diabetic exam. DMII x 13 years. On oral and insulin. Last hgA1c 6.2% in 5/22/18. Glucose under more control. Hoping to get cataract surgery with Dr. Diaz now.    OCT Macula 6/18/18  OD: center involving intra-retinal fluid temporal macula with slight interval worsening, no subretinal fluid, exudates  OS: temporal intraretinal fluid surrounding an MA    Assessment/plan   1. 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 Payan 4/25/18   - new swelling just nasal to fovea today   - recheck in 2 months        3. Myopia OS with secondary amblyopia              Does not wear glasses normally     4.  Pseudophakia both eyes    - Mild PCO in both eyes    - Monitor    Return 2 months for exam and OCT OU        Linda Romero MD   Ophthalmology PGY-3    ATTESTATION:  I have seen and examined the patient with Dr. Romero and agree with the findings in this note,as well as the interpretations of the diagnostic tests.    Tiburcio Martin MD PhD.  Professor & Chair.

## 2018-06-18 NOTE — MR AVS SNAPSHOT
After Visit Summary   6/18/2018    Nena Tang    MRN: [id number]           Patient Information     Date Of Birth          1960        Visit Information        Provider Department      6/18/2018 3:30 PM [facility]urcio [facility]acon [facility]        Today's Diagnoses     Type 2 diabetes mellitus with both eyes affected by mild nonproliferative retinopathy and macular edema, with long-term current use of insulin (H)    -  1       Follow-ups after your visit        Follow-up notes from your care team     Return in about 2 months (around 8/18/2018) for f/u DME, DFE, OCT.      Your next 10 appointments already scheduled     Jun 20, 2018 10:00 AM CDT   Ortho Treatment with Kiko Gallego PT   [facility] ([facility])    [address] [address]   [phone]            Jun 22, 2018  1:00 PM CDT   Return Visit with ART Arias CNP   [facility] ([facility])    [address]  [address]  [facility] [address]   [phone]            Jun 22, 2018  1:00 PM CDT   Return Visit with BIN Mondragon   [facility] ([facility])    [address]  [address]  [facility] [address]   [phone]            Jul 16, 2018 10:30 AM CDT   Return Visit with BIN Mondragon   [facility] ([facility])    [address]  [address]  [facility] [address]   [phone]            Jul 16, 2018 10:30 AM CDT   Return Visit with ART Arias CNP   [facility] ([facility])    [address]  [address]  [facility] [address]   [phone]            Aug 30, 2018  3:30 PM CDT   RETURN RETINA with Tiburcio Chacon MD   [facility] ([facility])     [address]  [address]  [facility] [address]   [phone]            Sep 07, 2018  3:00 PM CDT   Return Visit with Kraig Pedersen MD   [facility] ([facility])    [address]  [facility] [address]   [phone]            Sep 10, 2018 10:45 AM CDT   (Arrive by 10:30 AM)   Return Weight Management Visit with Debbie Germain PA-C   [facility] ([facility])    [address]  4th [address]  [facility] [address] [phone]              Future tests that were ordered for you today     Open Future Orders        Priority Expected Expires Ordered    OCT Retina Spectralis OU (both eyes) Routine  12/20/2019 6/18/2018            Who to contact     Please call your clinic at [phone] to:    Ask questions about your health    Make or cancel appointments    Discuss your medicines    Learn about your test results    Speak to your doctor            Additional Information About Your Visit        [employer] Information     [employer] gives you secure access to your electronic health record. If you see a primary care provider, you can also send messages to your care team and make appointments. If you have questions, please call your primary care clinic.  If you do not have a primary care provider, please call [phone] and they will assist you.      [employer] is an electronic gateway that provides easy, online access to your medical records. With [employer], you can request a clinic appointment, read your test results, renew a prescription or communicate with your care team.     To access your existing account, please contact your [facility] or call [phone] for assistance.        Care EveryWhere ID     This is your Care EveryWhere ID. This could be used by other organizations to access your [facility]  records  [id number]        Your Vitals Were     Last Period                   01/06/2015            Blood Pressure from Last 3 Encounters:   06/07/18 157/89   06/04/18 94/58   05/28/18 129/70    Weight from Last 3 Encounters:   06/07/18 107.6 kg (237 lb 4.8 oz)   06/04/18 106.6 kg (235 lb)   05/28/18 104.3 kg (230 lb)              We Performed the Following     OCT Retina Spectralis OU (both eyes)        Primary Care Provider Office Phone # Fax ART Cifuentes [phone]       [address] [facility] [address]  [facility] [address]        Goals        General    Medical (pt-stated)     Notes - Note created  7/7/2016  9:15 AM by Chelsea Pulido RN    Get blood sugars under control    Improve medication compliance    As of today's date 7/7/2016 goal is met at 0 - 25%.   Goal Status:  Active          Equal Access to Services     [facility]LY [facility] : Hadii aad ku hadasho Soomaali, waaxda luqadaha, qaybta kaalmada adeegyada, waxay idiin hayxiomaran yahir kharasae ellison . So [facility] [phone].    ATENCIÓN: Si habla español, tiene a trinh disposición servicios gratuitos de asistencia lingüística. Llame al [phone].    We comply with applicable federal civil rights laws and [place] laws. We do not discriminate on the basis of race, color, national origin, age, disability, sex, sexual orientation, or gender identity.            Thank you!     Thank you for choosing [facility]  for your care. Our goal is always to provide you with excellent care. Hearing back from our patients is one way we can continue to improve our services. Please take a few minutes to complete the written survey that you may receive in the mail after your visit with us. Thank you!             Your Updated Medication List - Protect others around you: Learn how to safely use, store and throw away your medicines at www.disposemymeds.org.          This list is accurate as of 6/18/18  4:57 PM.  Always use your most recent med list.                    Brand Name Dispense Instructions for use Diagnosis    * ACETAMINOPHEN PO      Take 1,000 mg by mouth every 6 hours as needed for pain or fever        * acetaminophen 500 MG tablet    TYLENOL    100 tablet    Take 2 tablets (1,000 mg) by mouth 3 times daily as needed for mild pain    Chronic low back pain, unspecified back pain laterality, with sciatica presence unspecified       albuterol 108 (90 Base) MCG/ACT Inhaler    PROAIR HFA/PROVENTIL HFA/VENTOLIN HFA    3 Inhaler    Inhale 2 puffs into the lungs every 6 hours as needed for shortness of breath / dyspnea or wheezing    Dyspnea on exertion       aspirin 81 MG EC tablet     28 tablet    TAKE 1 TABLET (81MG) BY MOUTH DAILY    Coronary artery disease involving native heart with angina pectoris, unspecified vessel or lesion type (H)       benztropine 0.5 MG tablet    COGENTIN    60 tablet    Take 1 tablet (0.5 mg) by mouth 2 times daily    Extrapyramidal and movement disorder       blood glucose monitoring lancets     150 each    Use to test blood sugar 2 times daily or as directed.  Ok to substitute alternative if insurance prefers.    Type 2 diabetes mellitus with diabetic neuropathy, with long-term current use of insulin (H)       blood glucose monitoring test strip    ONETOUCH VERIO IQ    200 strip    Use to test blood sugar 4 times daily .  Ok to substitute alternative if insurance prefers.    Type 2 diabetes mellitus with diabetic neuropathy, with long-term current use of insulin (H)       calcium carbonate 1500 (600 Ca) MG tablet    OS-ALLEN 600 mg Kiana. Ca    180 tablet    Take 1 tablet (1,500 mg) by mouth daily    Other osteoporosis without current pathological fracture       clotrimazole 1 % cream    LOTRIMIN     Apply topically 2 times daily        DIPHENDRYL PO      Take 25 mg by mouth every 6 hours as needed for itching        dulaglutide 1.5 MG/0.5ML pen    TRULICITY    2 mL    Inject 1.5 mg Subcutaneous every 7 days    Type 2 diabetes  mellitus with mild nonproliferative retinopathy without macular edema, with long-term current use of insulin, unspecified laterality (H)       gabapentin 300 MG capsule    NEURONTIN    168 capsule    TAKE 2 CAPSULES (600MG) BY MOUTH THREE TIMES A DAY    Type 2 diabetes mellitus with diabetic neuropathy, with long-term current use of insulin (H)       glucose 4 g Chew chewable tablet     20 tablet    Take 2 every 15 minutes for blood sugar <70mg/dL. Recheck blood sugar every 15 minutes until above 70mg/dL, then eat a substantial meal.    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, with long-term current use of insulin, unspecified laterality (H)       insulin pen needle 30G X 8 MM    NOVOFINE 30    400 each    USE 4 DAILY OR AS DIRECTED    Type 2 diabetes mellitus with mild nonproliferative retinopathy without macular edema, with long-term current use of insulin, unspecified laterality (H)       losartan 100 MG tablet    COZAAR    28 tablet    TAKE 1 TABLET (100MG) BY MOUTH DAILY    Coronary artery disease involving native heart with angina pectoris, unspecified vessel or lesion  type (H)       melatonin 5 MG Caps     180 capsule    Take 2 capsules by mouth daily At dinnertime    Insomnia, unspecified type       metoprolol succinate 25 MG 24 hr tablet    TOPROL-XL    30 tablet    Take 1 tablet (25 mg) by mouth daily    Coronary artery disease involving native heart with angina pectoris, unspecified vessel or lesion type (H)       * order for DME     1 each    Hold Novolog if meals are refused.    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    Generalized muscle weakness       paliperidone 9 MG 24 hr tablet    INVEGA    30 tablet    Take 1 tablet (9 mg) by mouth every morning    Schizoaffective disorder, bipolar type (H)       polyethylene glycol powder    MIRALAX/GLYCOLAX    527 g    TAKE 8.5 GRAMS (1/2 CAPFUL) BY MOUTH DAILY    Constipation, unspecified constipation type       prochlorperazine 5 MG tablet    COMPAZINE    90 tablet    Take 1 tablet (5 mg) by mouth every 6 hours as needed for nausea or vomiting    Nausea       ranitidine 300 MG tablet    ZANTAC    90 tablet    TAKE 1 TABLET (300MG) BY MOUTH AT BEDTIME    Gastroesophageal reflux disease without esophagitis       senna-docusate 8.6-50 MG per tablet    SENOKOT-S;PERICOLACE    100 tablet    Take 1 tablet by mouth 2 times daily as needed for constipation    Drug-induced constipation       sertraline 100 MG tablet    ZOLOFT    60 tablet    Take 2 tablets (200 mg) by mouth daily    Schizoaffective disorder, bipolar type (H)       SUMAtriptan 25 MG tablet    IMITREX    12 tablet    Take 1-2 tablets (25-50 mg) by mouth at onset  of headache for migraine May repeat in 2 hours. Max 8 tablets/24 hours.    Migraine with aura and without status migrainosus, not intractable       topiramate 25 MG tablet    TOPAMAX    90 tablet    25mg at bedtime for week 1, 50mg at bedtime for 1 week, and 75mg at bedtime thereafter    Morbid obesity (H)       vitamin D3 60433 UNITS capsule    CHOLECALCIFEROL    12 capsule    TAKE 1 CAPSULE (50,000 UNITS) MONTHLY    Vitamin D deficiency       * Notice:  This list has 4 medication(s) that are the same as other medications prescribed for you. Read the directions carefully, and ask your doctor or other care provider to review them with you.

## 2018-06-20 ENCOUNTER — HOSPITAL ENCOUNTER (OUTPATIENT)
Dept: PHYSICAL THERAPY | Facility: CLINIC | Age: 58
Setting detail: THERAPIES SERIES
End: 2018-06-20
Attending: NURSE PRACTITIONER
Payer: MEDICARE

## 2018-06-20 PROCEDURE — 97110 THERAPEUTIC EXERCISES: CPT | Mod: GP | Performed by: PHYSICAL THERAPIST

## 2018-06-20 PROCEDURE — G8979 MOBILITY GOAL STATUS: HCPCS | Mod: GP,CI | Performed by: PHYSICAL THERAPIST

## 2018-06-20 PROCEDURE — 40000718 ZZHC STATISTIC PT DEPARTMENT ORTHO VISIT: Performed by: PHYSICAL THERAPIST

## 2018-06-20 PROCEDURE — G8980 MOBILITY D/C STATUS: HCPCS | Mod: GP,CJ | Performed by: PHYSICAL THERAPIST

## 2018-06-20 NOTE — PROGRESS NOTES
Outpatient Physical Therapy Discharge Note     Patient: Nena Tang  : 1960    Beginning/End Dates of Reporting Period:  18 to 2018    Referring Provider: ART Ricketts, CNP    Therapy Diagnosis: Functional weakness and impaired mobility     Client Self Report: Nena reports she has been overall progressing and feeling well with her HEP, but today feels achy and has an upper chest cold.      Objective Measurements:  Objective Measure: TUG  Details: 8.09'' with no AD (baseline 10.16'' at eval)    Objective Measure: 25' Gait  Details: 6.56'' no AD taken 18 (baseline 10.69'' at eval)    Objective Measure: 30'' Sit <> Stand  Details: 14 reps taken on 18  (baseline 9 reps at eval)     Goals:  Goal Identifier TUG   Goal Description Nena will demo TUG with least restrictive AD in 8 sec or less to demonstrate improving strength, balance, and gait speed, as well as lower risk for falls (baseline score 10.16'', no AD)   Target Date 18 (Improvement to 8.09 without AD)   Date Met      Progress:  Goal improved but not met     Goal Identifier Gait Speed   Goal Description Nena will demo 25' Gait assessment with LRAD in <7.62 sec to show gait speed of at least 1.0 m/sec needed for improved ambulation in community environments (baseline 25' Gait assessment 10.69 sec equivalent to ~0.7 m/sec).   Target Date 18   Date Met  18 (6.56'' no AD, incdicating gait speed of >1.1 m/sec)   Progress:  Goal met     Goal Identifier Activity   Goal Description Nena will demonstrate walking with LRAD or use of Nu-Step machine x 8 minutes continuously for improved strength and ability to comply with regular walking program (baseline limited to <2:30).   Target Date 18 (Goal in progress, tolerating x 10 min with short rest breaks)   Date Met      Progress:  Goal partially met     Goal Identifier HEP   Goal Description Nena will demo (I) with HEP for continued  improvement of strength and safety with gait/mobility skills.   Target Date 06/29/18 (Goal met 6/20/18)   Date Met      Progress: Goal met     Progress Toward Goals:   Progress this reporting period: Nena has been seen for a total of 8 PT visits to address generalized functional weakness and impaired mobility.  She has shown improvement in overall strength, endurance with continuous exercise, and generalized balance/gait stability.  She continues to be limited in her ability to ambulate long distances without rest break due to deconditioning/weakness, and will likely benefit from 4WW long term in order to optimize walking tolerance.  She will benefit from a long-term walking program to maintain/improve strength, conditioning, and cardiopulmonary health.  Nena is in agreement with this and is planning to obtain a 4WW.  She was instructed in a long term HEP, goals partially met.  Does not require ongoing skilled PT intervention, will continue with HEP.    Plan:  Discharge from therapy.    Discharge: yes    Reason for Discharge: No further expectation of progress.  No further skilled therapy need, continue HEP.    Equipment Issued: none, pt plans to obtain 4WW on her own as insurance did not cover one as requested by this therapist    Discharge Plan: Patient to continue home program.

## 2018-06-22 ENCOUNTER — TELEPHONE (OUTPATIENT)
Dept: FAMILY MEDICINE | Facility: CLINIC | Age: 58
End: 2018-06-22

## 2018-06-22 ENCOUNTER — NURSE TRIAGE (OUTPATIENT)
Dept: NURSING | Facility: CLINIC | Age: 58
End: 2018-06-22

## 2018-06-22 ENCOUNTER — OFFICE VISIT (OUTPATIENT)
Dept: BEHAVIORAL HEALTH | Facility: CLINIC | Age: 58
End: 2018-06-22
Payer: MEDICARE

## 2018-06-22 ENCOUNTER — OFFICE VISIT (OUTPATIENT)
Dept: FAMILY MEDICINE | Facility: CLINIC | Age: 58
End: 2018-06-22
Payer: MEDICARE

## 2018-06-22 VITALS
SYSTOLIC BLOOD PRESSURE: 128 MMHG | WEIGHT: 236 LBS | BODY MASS INDEX: 45.14 KG/M2 | RESPIRATION RATE: 22 BRPM | DIASTOLIC BLOOD PRESSURE: 58 MMHG | HEART RATE: 84 BPM | TEMPERATURE: 99.3 F | OXYGEN SATURATION: 94 %

## 2018-06-22 DIAGNOSIS — F25.1 SCHIZOAFFECTIVE DISORDER, DEPRESSIVE TYPE (H): Primary | ICD-10-CM

## 2018-06-22 DIAGNOSIS — J18.9 PNEUMONIA DUE TO INFECTIOUS ORGANISM, UNSPECIFIED LATERALITY, UNSPECIFIED PART OF LUNG: Primary | ICD-10-CM

## 2018-06-22 DIAGNOSIS — J18.9 PNEUMONIA DUE TO INFECTIOUS ORGANISM, UNSPECIFIED LATERALITY, UNSPECIFIED PART OF LUNG: ICD-10-CM

## 2018-06-22 DIAGNOSIS — Z78.9 ALCOHOL USE: ICD-10-CM

## 2018-06-22 DIAGNOSIS — R06.2 WHEEZING: ICD-10-CM

## 2018-06-22 DIAGNOSIS — F25.1 SCHIZOAFFECTIVE DISORDER, DEPRESSIVE TYPE (H): ICD-10-CM

## 2018-06-22 DIAGNOSIS — J18.9 PNEUMONIA, ORGANISM UNSPECIFIED(486): Primary | ICD-10-CM

## 2018-06-22 DIAGNOSIS — F43.10 POSTTRAUMATIC STRESS DISORDER: ICD-10-CM

## 2018-06-22 PROCEDURE — 90832 PSYTX W PT 30 MINUTES: CPT | Performed by: SOCIAL WORKER

## 2018-06-22 PROCEDURE — 99214 OFFICE O/P EST MOD 30 MIN: CPT | Performed by: NURSE PRACTITIONER

## 2018-06-22 RX ORDER — PREDNISONE 20 MG/1
40 TABLET ORAL DAILY
Qty: 10 TABLET | Refills: 0 | Status: SHIPPED | OUTPATIENT
Start: 2018-06-22 | End: 2019-01-30

## 2018-06-22 RX ORDER — AZITHROMYCIN 250 MG/1
TABLET, FILM COATED ORAL
Qty: 6 TABLET | Refills: 0 | Status: SHIPPED | OUTPATIENT
Start: 2018-06-22 | End: 2018-06-22

## 2018-06-22 RX ORDER — IPRATROPIUM BROMIDE AND ALBUTEROL SULFATE 2.5; .5 MG/3ML; MG/3ML
1 SOLUTION RESPIRATORY (INHALATION) EVERY 6 HOURS PRN
Qty: 30 VIAL | Refills: 0 | Status: SHIPPED | OUTPATIENT
Start: 2018-06-22 | End: 2018-08-22

## 2018-06-22 RX ORDER — AZITHROMYCIN 250 MG/1
TABLET, FILM COATED ORAL
Qty: 6 TABLET | Refills: 0 | Status: SHIPPED | OUTPATIENT
Start: 2018-06-22 | End: 2018-06-29

## 2018-06-22 RX ORDER — IPRATROPIUM BROMIDE AND ALBUTEROL SULFATE 2.5; .5 MG/3ML; MG/3ML
1 SOLUTION RESPIRATORY (INHALATION) EVERY 6 HOURS PRN
Qty: 30 VIAL | Refills: 0 | Status: SHIPPED | OUTPATIENT
Start: 2018-06-22 | End: 2018-06-29

## 2018-06-22 RX ORDER — ALBUTEROL SULFATE 90 UG/1
2 AEROSOL, METERED RESPIRATORY (INHALATION) EVERY 6 HOURS PRN
Qty: 1 INHALER | Refills: 0 | Status: SHIPPED | OUTPATIENT
Start: 2018-06-22 | End: 2019-07-28

## 2018-06-22 NOTE — TELEPHONE ENCOUNTER
"Wendy Christie NP paged and order for Albuterol inhaler obtained.  Message routed to PCP   Carmen Jacobs RN  [facility]      Reason for Disposition    [1] Request for URGENT new prescription or refill of \"essential\" medication (i.e., likelihood of harm to patient if not taken) AND [2] triager unable to fill per unit policy    Protocols used: MEDICATION QUESTION CALL-ADULT-      "

## 2018-06-22 NOTE — MR AVS SNAPSHOT
After Visit Summary   6/22/2018    Nena Tang    MRN: [id number]           Patient Information     Date Of Birth          1960        Visit Information        Provider Department      6/22/2018 1:30 PM Richardson Lopez, BIN [facility]        Today's Diagnoses     Schizoaffective disorder, depressive type (H)    -  1    Posttraumatic stress disorder        Alcohol use           Follow-ups after your visit        Your next 10 appointments already scheduled     Jul 16, 2018 10:30 AM CDT   Return Visit with BIN Mondragon   [facility] ([facility])    [address]  [address]  [facility] [address]   [phone]            Jul 16, 2018 10:30 AM CDT   Return Visit with ART Arias CNP   [facility] ([facility])    [address]  [address]  [facility] [address]   [phone]            Aug 30, 2018  3:30 PM CDT   RETURN RETINA with Tiburcio Chacon MD   [facility] ([facility])    [address]  [address] [address]  [facility] [address]   [phone]            Sep 07, 2018  3:00 PM CDT   Return Visit with Kraig Pedersen MD   [facility] ([facility])    [address]  [facility] [address]   [phone]            Sep 10, 2018 10:45 AM CDT   (Arrive by 10:30 AM)   Return Weight Management Visit with Debbie Germain PA-C   [facility] ([facility])    [address]  4th Floor  [facility] [id number] [phone]              Who to contact     If you have questions or need follow up information about today's clinic visit or your schedule please contact [facility] directly at [phone].  Normal or  non-critical lab and imaging results will be communicated to you by MyChart, letter or phone within 4 business days after the clinic has received the results. If you do not hear from us within 7 days, please contact the clinic through Garmort or phone. If you have a critical or abnormal lab result, we will notify you by phone as soon as possible.  Submit refill requests through Xamarin or call your pharmacy and they will forward the refill request to us. Please allow 3 business days for your refill to be completed.          Additional Information About Your Visit        Xamarin Information     [employer] gives you secure access to your electronic health record. If you see a primary care provider, you can also send messages to your care team and make appointments. If you have questions, please call your primary care clinic.  If you do not have a primary care provider, please call [phone] and they will assist you.        Care EveryWhere ID     This is your Care EveryWhere ID. This could be used by other organizations to access your [place] medical records  [id number]        Your Vitals Were     Last Period                   01/06/2015            Blood Pressure from Last 3 Encounters:   06/22/18 128/58   06/07/18 157/89   06/04/18 94/58    Weight from Last 3 Encounters:   06/22/18 236 lb (107 kg)   06/07/18 237 lb 4.8 oz (107.6 kg)   06/04/18 235 lb (106.6 kg)              Today, you had the following     No orders found for display         Today's Medication Changes          These changes are accurate as of 6/22/18 11:59 PM.  If you have any questions, ask your nurse or doctor.               Start taking these medicines.        Dose/Directions    azithromycin 250 MG tablet   Commonly known as:  ZITHROMAX   Used for:  Pneumonia due to infectious organism, unspecified laterality, unspecified part of lung   Started by:  Wendy Tang APRN CNP        Two tablets first day, then one tablet daily for four  days.   Quantity:  6 tablet   Refills:  0       * ipratropium - albuterol 0.5 mg/2.5 mg/3 mL 0.5-2.5 (3) MG/3ML neb solution   Commonly known as:  DUONEB   Used for:  Pneumonia due to infectious organism, unspecified laterality, unspecified part of lung, Wheezing   Started by:  Violetta Solorzano APRN CNP        Dose:  1 vial   Take 1 vial (3 mLs) by nebulization every 6 hours as needed for shortness of breath / dyspnea or wheezing   Quantity:  30 vial   Refills:  0       * ipratropium - albuterol 0.5 mg/2.5 mg/3 mL 0.5-2.5 (3) MG/3ML neb solution   Commonly known as:  DUONEB   Used for:  Pneumonia due to infectious organism, unspecified laterality, unspecified part of lung, Wheezing   Started by:  Violetta Solorzano APRN CNP        Dose:  1 vial   Take 1 vial (3 mLs) by nebulization every 6 hours as needed for shortness of breath / dyspnea or wheezing   Quantity:  30 vial   Refills:  0       order for DME   Used for:  Pneumonia due to infectious organism, unspecified laterality, unspecified part of lung, Wheezing   Started by:  Violetta Solorzano APRN CNP        Equipment being ordered: Nebulizer   Quantity:  1 Units   Refills:  0       predniSONE 20 MG tablet   Commonly known as:  DELTASONE   Used for:  Wheezing   Started by:  Violetta Solorzano APRN CNP        Dose:  40 mg   Take 2 tablets (40 mg) by mouth daily for 5 days   Quantity:  10 tablet   Refills:  0       * Notice:  This list has 2 medication(s) that are the same as other medications prescribed for you. Read the directions carefully, and ask your doctor or other care provider to review them with you.      These medicines have changed or have updated prescriptions.        Dose/Directions    * albuterol 108 (90 Base) MCG/ACT Inhaler   Commonly known as:  PROAIR HFA/PROVENTIL HFA/VENTOLIN HFA   This may have changed:  Another medication with the same name was added. Make sure you understand how and when to take each.   Used for:   Dyspnea on exertion   Changed by:  Wendy Tang APRN CNP        Dose:  2 puff   Inhale 2 puffs into the lungs every 6 hours as needed for shortness of breath / dyspnea or wheezing   Quantity:  3 Inhaler   Refills:  1       * albuterol 108 (90 Base) MCG/ACT Inhaler   Commonly known as:  PROAIR HFA/PROVENTIL HFA/VENTOLIN HFA   This may have changed:  You were already taking a medication with the same name, and this prescription was added. Make sure you understand how and when to take each.   Used for:  Pneumonia, organism unspecified(486)   Changed by:  Wendy Tang APRN CNP        Dose:  2 puff   Inhale 2 puffs into the lungs every 6 hours as needed for shortness of breath / dyspnea or wheezing   Quantity:  1 Inhaler   Refills:  0       * Notice:  This list has 2 medication(s) that are the same as other medications prescribed for you. Read the directions carefully, and ask your doctor or other care provider to review them with you.         Where to get your medicines      These medications were sent to [address] [address]     Phone:  [phone]     ipratropium - albuterol 0.5 mg/2.5 mg/3 mL 0.5-2.5 (3) MG/3ML neb solution    ipratropium - albuterol 0.5 mg/2.5 mg/3 mL 0.5-2.5 (3) MG/3ML neb solution    predniSONE 20 MG tablet         These medications were sent to [facility] [address] [place] [id number] [address] [address]     Phone:  [phone]     albuterol 108 (90 Base) MCG/ACT Inhaler    azithromycin 250 MG tablet         Some of these will need a paper prescription and others can be bought over the counter.  Ask your nurse if you have questions.     Bring a paper prescription for each of these medications     order for [facility]                Primary Care Provider Office Phone # Fax #    ART Arias  [facility] [phone]       [address] [facility] [address]  [facility] [address]        Goals        General    Medical (pt-stated)     Notes - Note created  7/7/2016  9:15 AM by Chelsea Pulido, RN    Get blood sugars under control    Improve medication compliance    As of today's date 7/7/2016 goal is met at 0 - 25%.   Goal Status:  Active          Equal Access to Services     [facility] : Hadii aad ku hadasho Soomaali, waaxda luqadaha, qaybta kaalmada adeegyada, waxay idiin hayaan adeeg alex laLuzaan . So [facility] [phone].    ATENCIÓN: Si habla español, tiene a trinh disposición servicios gratuitos de asistencia lingüística. Llame al [phone].    We comply with applicable federal civil rights laws and [place] laws. We do not discriminate on the basis of race, color, national origin, age, disability, sex, sexual orientation, or gender identity.            Thank you!     Thank you for choosing [facility]  for your care. Our goal is always to provide you with excellent care. Hearing back from our patients is one way we can continue to improve our services. Please take a few minutes to complete the written survey that you may receive in the mail after your visit with us. Thank you!             Your Updated Medication List - Protect others around you: Learn how to safely use, store and throw away your medicines at www.disposemymeds.org.          This list is accurate as of 6/22/18 11:59 PM.  Always use your most recent med list.                   Brand Name Dispense Instructions for use Diagnosis    * ACETAMINOPHEN PO      Take 1,000 mg by mouth every 6 hours as needed for pain or fever        * acetaminophen 500 MG tablet    TYLENOL    100 tablet    Take 2 tablets (1,000 mg) by mouth 3 times daily as needed for mild pain    Chronic low back pain, unspecified back pain laterality, with sciatica presence unspecified       * albuterol 108 (90 Base) MCG/ACT Inhaler    PROAIR  HFA/PROVENTIL HFA/VENTOLIN HFA    3 Inhaler    Inhale 2 puffs into the lungs every 6 hours as needed for shortness of breath / dyspnea or wheezing    Dyspnea on exertion       * albuterol 108 (90 Base) MCG/ACT Inhaler    PROAIR HFA/PROVENTIL HFA/VENTOLIN HFA    1 Inhaler    Inhale 2 puffs into the lungs every 6 hours as needed for shortness of breath / dyspnea or wheezing    Pneumonia, organism unspecified(486)       aspirin 81 MG EC tablet     28 tablet    TAKE 1 TABLET (81MG) BY MOUTH DAILY    Coronary artery disease involving native heart with angina pectoris, unspecified vessel or lesion type (H)       azithromycin 250 MG tablet    ZITHROMAX    6 tablet    Two tablets first day, then one tablet daily for four days.    Pneumonia due to infectious organism, unspecified laterality, unspecified part of lung       benztropine 0.5 MG tablet    COGENTIN    60 tablet    Take 1 tablet (0.5 mg) by mouth 2 times daily    Extrapyramidal and movement disorder       blood glucose monitoring lancets     150 each    Use to test blood sugar 2 times daily or as directed.  Ok to substitute alternative if insurance prefers.    Type 2 diabetes mellitus with diabetic neuropathy, with long-term current use of insulin (H)       blood glucose monitoring test strip    ONETOUCH VERIO IQ    200 strip    Use to test blood sugar 4 times daily .  Ok to substitute alternative if insurance prefers.    Type 2 diabetes mellitus with diabetic neuropathy, with long-term current use of insulin (H)       calcium carbonate 1500 (600 Ca) MG tablet    OS-ALLEN 600 mg [place]. 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 Recheck blood sugar every 15 minutes until above 70mg/dL, then eat a substantial meal.    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, with long-term current use of insulin, unspecified laterality (H)       insulin pen needle 30G X 8 MM    NOVOFINE 30    400 each    USE 4 DAILY OR AS DIRECTED    Type 2 diabetes mellitus with mild nonproliferative retinopathy without macular edema, with long-term current use of insulin, unspecified laterality (H)       * ipratropium - albuterol 0.5 mg/2.5 mg/3 mL 0.5-2.5 (3) MG/3ML neb solution    DUONEB    30 vial    Take 1 vial (3 mLs) by nebulization every 6 hours as needed for shortness of  breath / dyspnea or wheezing    Pneumonia due to infectious organism, unspecified laterality, unspecified part of lung, Wheezing       * ipratropium - albuterol 0.5 mg/2.5 mg/3 mL 0.5-2.5 (3) MG/3ML neb solution    DUONEB    30 vial    Take 1 vial (3 mLs) by nebulization every 6 hours as needed for shortness of breath / dyspnea or wheezing    Pneumonia due to infectious organism, unspecified laterality, unspecified part of lung, Wheezing       losartan 100 MG tablet    COZAAR    28 tablet    TAKE 1 TABLET (100MG) BY MOUTH DAILY    Coronary artery disease involving native heart with angina pectoris, unspecified vessel or lesion type (H)       melatonin 5 MG Caps     180 capsule    Take 2 capsules by mouth daily At dinnertime    Insomnia, unspecified type       metoprolol succinate 25 MG 24 hr tablet    TOPROL-XL    30 tablet    Take 1 tablet (25 mg) by mouth daily    Coronary artery disease involving native heart with angina pectoris, unspecified vessel or lesion type (H)       * order for DME     1 each    Hold Novolog if meals are refused.    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    Generalized muscle weakness       order for DME     1 Units    Equipment being ordered: Nebulizer    Pneumonia due to infectious organism, unspecified laterality, unspecified part of lung, Wheezing       paliperidone 9 MG 24 hr tablet    INVEGA    30 tablet    Take 1 tablet (9 mg) by mouth every morning    Schizoaffective disorder, bipolar type (H)       polyethylene glycol powder    MIRALAX/GLYCOLAX    527 g    TAKE 8.5  GRAMS (1/2 CAPFUL) BY MOUTH DAILY    Constipation, unspecified constipation type       predniSONE 20 MG tablet    DELTASONE    10 tablet    Take 2 tablets (40 mg) by mouth daily for 5 days    Wheezing       prochlorperazine 5 MG tablet    COMPAZINE    90 tablet    Take 1 tablet (5 mg) by mouth every 6 hours as needed for nausea or vomiting    Nausea       ranitidine 300 MG tablet    ZANTAC    90 tablet    TAKE 1 TABLET (300MG) BY MOUTH AT BEDTIME    Gastroesophageal reflux disease without esophagitis       senna-docusate 8.6-50 MG per tablet    SENOKOT-S;PERICOLACE    100 tablet    Take 1 tablet by mouth 2 times daily as needed for constipation    Drug-induced constipation       sertraline 100 MG tablet    ZOLOFT    60 tablet    Take 2 tablets (200 mg) by mouth daily    Schizoaffective disorder, bipolar type (H)       SUMAtriptan 25 MG tablet    IMITREX    12 tablet    Take 1-2 tablets (25-50 mg) by mouth at onset of headache for migraine May repeat in 2 hours. Max 8 tablets/24 hours.    Migraine with aura and without status migrainosus, not intractable       topiramate 25 MG tablet    TOPAMAX    90 tablet    25mg at bedtime for week 1, 50mg at bedtime for 1 week, and 75mg at bedtime thereafter    Morbid obesity (H)       vitamin D3 70483 UNITS capsule    CHOLECALCIFEROL    12 capsule    TAKE 1 CAPSULE (50,000 UNITS) MONTHLY    Vitamin D deficiency       * Notice:  This list has 8 medication(s) that are the same as other medications prescribed for you. Read the directions carefully, and ask your doctor or other care provider to review them with you.

## 2018-06-22 NOTE — NURSING NOTE
The following nebulizer treatment was given:     MEDICATION: Duoneb  : [employer]  LOT #: [id number]  EXPIRATION DATE:  June 19  NDC # 8998-0971-05     Nebulizer Start Time:  2:05 pm   Nebulizer Stop Time:  02:11 pm   See Vital Signs Flowsheet

## 2018-06-22 NOTE — TELEPHONE ENCOUNTER
Zithromax interacts with Haldol and Paliperidone.  Paged Wendy Christie NP to [phone] via answering service, who gave the go-ahead to continue prescription. Reordered and sent to [facility] in [place] as [facility] is now closed.  Routed to PCP.  Carmen Jacobs RN  [facility]      Reason for Disposition    Pharmacy calling with prescription questions and triager unable to answer question    Protocols used: MEDICATION QUESTION CALL-ADULT-

## 2018-06-22 NOTE — MR AVS SNAPSHOT
After Visit Summary   6/22/2018    Nena Tang    MRN: [id number]           Patient Information     Date Of Birth          1960        Visit Information        Provider Department      6/22/2018 1:30 PM Violetta Solorzano APRN CNP [facility]        Today's Diagnoses     Screening for HIV (human immunodeficiency virus)    -  1    Pneumonia due to infectious organism, unspecified laterality, unspecified part of lung        Wheezing           Follow-ups after your visit        Your next 10 appointments already scheduled     Jul 16, 2018 10:30 AM CDT   Return Visit with BIN Mondragon   [facility] ([facility])    [address]  [address]  [facility] [address]   [phone]            Jul 16, 2018 10:30 AM CDT   Return Visit with ART Arias CNP   [facility] ([facility])    [address]  [address]  [facility] [address]   [phone]            Aug 30, 2018  3:30 PM CDT   RETURN RETINA with Tiburcio Chacon MD   [facility] ([facility])    [address]  [address] [facility] [address]  [facility] [address]   [phone]            Sep 07, 2018  3:00 PM CDT   Return Visit with Kraig Pedersen MD   [facility] ([facility])    [address]  [facility] [address]   [phone]            Sep 10, 2018 10:45 AM CDT   (Arrive by 10:30 AM)   Return Weight Management Visit with Debbie Germain PA-C   [facility] ([facility])    [address]  [address]th [address]  [facility] [id number] [phone]              Who to contact     If you have questions or need follow up information about today's clinic visit or your schedule please contact [facility] directly at [phone].  Normal or non-critical lab and imaging results will be communicated to you by MyChart, letter or phone within 4 business days after the clinic has received the results. If you do not hear from us within 7 days, please contact the clinic through Synchrohart or phone. If you have a critical or abnormal lab result, we will notify you by phone as soon as possible.  Submit refill requests through Circular or call your pharmacy and they will forward the refill request to us. Please allow 3 business days for your refill to be completed.          Additional Information About Your Visit        SynchroharL4 Mobile Information     Circular gives you secure access to your electronic health record. If you see a primary care provider, you can also send messages to your care team and make appointments. If you have questions, please call your primary care clinic.  If you do not have a primary care provider, please call [phone] and they will assist you.        Care EveryWhere ID     This is your Care EveryWhere ID. This could be used by other organizations to access your [facility] medical records  [id number]        Your Vitals Were     Pulse Temperature Respirations Last Period Pulse Oximetry BMI (Body Mass Index)    84 99.3  F (37.4  C) (Oral) 22 01/06/2015 94% 45.14 kg/m2       Blood Pressure from Last 3 Encounters:   06/22/18 128/58   06/07/18 157/89   06/04/18 94/58    Weight from Last 3 Encounters:   06/22/18 236 lb (107 kg)   06/07/18 237 lb 4.8 oz (107.6 kg)   06/04/18 235 lb (106.6 kg)              Today, you had the following     No orders found for display         Today's Medication Changes          These changes are accurate as of 6/22/18  2:18 PM.  If you have any questions, ask your nurse or doctor.               Start taking these medicines.        Dose/Directions    azithromycin 250 MG tablet   Commonly known as:  ZITHROMAX   Used for:  Pneumonia due to infectious  organism, unspecified laterality, unspecified part of lung   Started by:  Violetta Solorzano APRN CNP        Two tablets first day, then one tablet daily for four days.   Quantity:  6 tablet   Refills:  0       * ipratropium - albuterol 0.5 mg/2.5 mg/3 mL 0.5-2.5 (3) MG/3ML neb solution   Commonly known as:  DUONEB   Used for:  Pneumonia due to infectious organism, unspecified laterality, unspecified part of lung, Wheezing   Started by:  Violetta Solorzano APRN CNP        Dose:  1 vial   Take 1 vial (3 mLs) by nebulization every 6 hours as needed for shortness of breath / dyspnea or wheezing   Quantity:  30 vial   Refills:  0       * ipratropium - albuterol 0.5 mg/2.5 mg/3 mL 0.5-2.5 (3) MG/3ML neb solution   Commonly known as:  DUONEB   Used for:  Pneumonia due to infectious organism, unspecified laterality, unspecified part of lung, Wheezing   Started by:  Violetta Solorzano APRN CNP        Dose:  1 vial   Take 1 vial (3 mLs) by nebulization every 6 hours as needed for shortness of breath / dyspnea or wheezing   Quantity:  30 vial   Refills:  0       order for DME   Used for:  Pneumonia due to infectious organism, unspecified laterality, unspecified part of lung, Wheezing   Started by:  Violetta Solorzano APRN CNP        Equipment being ordered: Nebulizer   Quantity:  1 Units   Refills:  0       predniSONE 20 MG tablet   Commonly known as:  DELTASONE   Used for:  Wheezing   Started by:  Violetta Solorzano APRN CNP        Dose:  40 mg   Take 2 tablets (40 mg) by mouth daily for 5 days   Quantity:  10 tablet   Refills:  0       * Notice:  This list has 2 medication(s) that are the same as other medications prescribed for you. Read the directions carefully, and ask your doctor or other care provider to review them with you.         Where to get your medicines      These medications were sent to [place], [place] - [address] [place] [address]  [facility] [address]     Phone:  [phone]     azithromycin 250 MG tablet    ipratropium - albuterol 0.5 mg/2.5 mg/3 mL 0.5-2.5 (3) MG/3ML neb solution    ipratropium - albuterol 0.5 mg/2.5 mg/3 mL 0.5-2.5 (3) MG/3ML neb solution    predniSONE 20 MG tablet         Some of these will need a paper prescription and others can be bought over the counter.  Ask your nurse if you have questions.     Bring a paper prescription for each of these medications     order for DME                Primary Care Provider Office Phone # Fax #    Rowena Haas ART [phone]       [address] [facility] [address]  [facility] [address]        Goals        General    Medical (pt-stated)     Notes - Note created  7/7/2016  9:15 AM by Chelsea Pulido RN    Get blood sugars under control    Improve medication compliance    As of today's date 7/7/2016 goal is met at 0 - 25%.   Goal Status:  Active          Equal Access to Services     [facility]: Hadii samira shane hadasho Sodelphine, waaxda luqadaha, qaybta kaalmada ademonicayasilvia, lorena ellison . So [facility] [phone].    ATENCIÓN: Si habla español, tiene a trinh disposición servicios gratuitos de asistencia lingüística. Fatouame al [phone].    We comply with applicable federal civil rights laws and [place] laws. We do not discriminate on the basis of race, color, national origin, age, disability, sex, sexual orientation, or gender identity.            Thank you!     Thank you for choosing [facility]  for your care. Our goal is always to provide you with excellent care. Hearing back from our patients is one way we can continue to improve our services. Please take a few minutes to complete the written survey that you may receive in the mail after your visit with us. Thank you!             Your Updated Medication List - Protect others around you: Learn how to safely use, store and throw away your medicines at  www.disposemymeds.org.          This list is accurate as of 6/22/18  2:18 PM.  Always use your most recent med list.                   Brand Name Dispense Instructions for use Diagnosis    * ACETAMINOPHEN PO      Take 1,000 mg by mouth every 6 hours as needed for pain or fever        * acetaminophen 500 MG tablet    TYLENOL    100 tablet    Take 2 tablets (1,000 mg) by mouth 3 times daily as needed for mild pain    Chronic low back pain, unspecified back pain laterality, with sciatica presence unspecified       albuterol 108 (90 Base) MCG/ACT Inhaler    PROAIR HFA/PROVENTIL HFA/VENTOLIN HFA    3 Inhaler    Inhale 2 puffs into the lungs every 6 hours as needed for shortness of breath / dyspnea or wheezing    Dyspnea on exertion       aspirin 81 MG EC tablet     28 tablet    TAKE 1 TABLET (81MG) BY MOUTH DAILY    Coronary artery disease involving native heart with angina pectoris, unspecified vessel or lesion type (H)       azithromycin 250 MG tablet    ZITHROMAX    6 tablet    Two tablets first day, then one tablet daily for four days.    Pneumonia due to infectious organism, unspecified laterality, unspecified part of lung       benztropine 0.5 MG tablet    COGENTIN    60 tablet    Take 1 tablet (0.5 mg) by mouth 2 times daily    Extrapyramidal and movement disorder       blood glucose monitoring lancets     150 each    Use to test blood sugar 2 times daily or as directed.  Ok to substitute alternative if insurance prefers.    Type 2 diabetes mellitus with diabetic neuropathy, with long-term current use of insulin (H)       blood glucose monitoring test strip    ONETOUCH VERIO IQ    200 strip    Use to test blood sugar 4 times daily .  Ok to substitute alternative if insurance prefers.    Type 2 diabetes mellitus with diabetic neuropathy, with long-term current use of insulin (H)       calcium carbonate 1500 (600 Ca) MG tablet    OS-ALLEN 600 mg [place]. Ca    180 tablet    Take 1 tablet (1,500 mg) by mouth daily     Other osteoporosis without current pathological fracture       clotrimazole 1 % cream    LOTRIMIN     Apply topically 2 times daily        DIPHENDRYL PO      Take 25 mg by mouth every 6 hours as needed for itching        dulaglutide 1.5 MG/0.5ML pen    TRULICITY    2 mL    Inject 1.5 mg Subcutaneous every 7 days    Type 2 diabetes mellitus with mild nonproliferative retinopathy without macular edema, with long-term current use of insulin, unspecified laterality (H)       gabapentin 300 MG capsule    NEURONTIN    168 capsule    TAKE 2 CAPSULES (600MG) BY MOUTH THREE TIMES A DAY    Type 2 diabetes mellitus with diabetic neuropathy, with long-term current use of insulin (H)       glucose 4 g Chew chewable tablet     20 tablet    Take 2 every 15 minutes for blood sugar <70mg/dL. Recheck blood sugar every 15 minutes until above 70mg/dL, then eat a substantial meal.    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, with long-term current use of insulin, unspecified laterality (H)       insulin pen needle 30G X 8 MM    NOVOFINE 30    400 each    USE 4 DAILY OR AS DIRECTED    Type 2 diabetes  mellitus with mild nonproliferative retinopathy without macular edema, with long-term current use of insulin, unspecified laterality (H)       * ipratropium - albuterol 0.5 mg/2.5 mg/3 mL 0.5-2.5 (3) MG/3ML neb solution    DUONEB    30 vial    Take 1 vial (3 mLs) by nebulization every 6 hours as needed for shortness of breath / dyspnea or wheezing    Pneumonia due to infectious organism, unspecified laterality, unspecified part of lung, Wheezing       * ipratropium - albuterol 0.5 mg/2.5 mg/3 mL 0.5-2.5 (3) MG/3ML neb solution    DUONEB    30 vial    Take 1 vial (3 mLs) by nebulization every 6 hours as needed for shortness of breath / dyspnea or wheezing    Pneumonia due to infectious organism, unspecified laterality, unspecified part of lung, Wheezing       losartan 100 MG tablet    COZAAR    28 tablet    TAKE 1 TABLET (100MG) BY MOUTH DAILY    Coronary artery disease involving native heart with angina pectoris, unspecified vessel or lesion type (H)       melatonin 5 MG Caps     180 capsule    Take 2 capsules by mouth daily At dinnertime    Insomnia, unspecified type       metoprolol succinate 25 MG 24 hr tablet    TOPROL-XL    30 tablet    Take 1 tablet (25 mg) by mouth daily    Coronary artery disease involving native heart with angina pectoris, unspecified vessel or lesion type (H)       * order for DME     1 each    Hold Novolog if meals are refused.    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    Generalized muscle weakness       order for DME     1 Units    Equipment being ordered: Nebulizer     Pneumonia due to infectious organism, unspecified laterality, unspecified part of lung, Wheezing       paliperidone 9 MG 24 hr tablet    INVEGA    30 tablet    Take 1 tablet (9 mg) by mouth every morning    Schizoaffective disorder, bipolar type (H)       polyethylene glycol powder    MIRALAX/GLYCOLAX    527 g    TAKE 8.5 GRAMS (1/2 CAPFUL) BY MOUTH DAILY    Constipation, unspecified constipation type       predniSONE 20 MG tablet    DELTASONE    10 tablet    Take 2 tablets (40 mg) by mouth daily for 5 days    Wheezing       prochlorperazine 5 MG tablet    COMPAZINE    90 tablet    Take 1 tablet (5 mg) by mouth every 6 hours as needed for nausea or vomiting    Nausea       ranitidine 300 MG tablet    ZANTAC    90 tablet    TAKE 1 TABLET (300MG) BY MOUTH AT BEDTIME    Gastroesophageal reflux disease without esophagitis       senna-docusate 8.6-50 MG per tablet    SENOKOT-S;PERICOLACE    100 tablet    Take 1 tablet by mouth 2 times daily as needed for constipation    Drug-induced constipation       sertraline 100 MG tablet    ZOLOFT    60 tablet    Take 2 tablets (200 mg) by mouth daily    Schizoaffective disorder, bipolar type (H)       SUMAtriptan 25 MG tablet    IMITREX    12 tablet    Take 1-2 tablets (25-50 mg) by mouth at onset of headache for migraine May repeat in 2 hours. Max 8 tablets/24 hours.    Migraine with aura and without status migrainosus, not intractable       topiramate 25 MG tablet    TOPAMAX    90 tablet    25mg at bedtime for week 1, 50mg at bedtime for 1 week, and 75mg at bedtime thereafter    Morbid obesity (H)       vitamin D3 40001 UNITS capsule    CHOLECALCIFEROL    12 capsule    TAKE 1 CAPSULE (50,000 UNITS) MONTHLY    Vitamin D deficiency       * Notice:  This list has 6 medication(s) that are the same as other medications prescribed for you. Read the directions carefully, and ask your doctor or other care provider to review them with you.

## 2018-06-22 NOTE — TELEPHONE ENCOUNTER
Reason for Call:  Call back     Detailed comments: Potential drug interaction between these 3 medication, Zithromax, Invega, and Haldol.     Phone Number Patient can be reached at: [phone]    Best Time: anytime    Can we leave a detailed message on this number? YES       Call taken on 6/22/2018 at 2:34 PM by Giorgi Case

## 2018-06-22 NOTE — TELEPHONE ENCOUNTER
[facility] calling to say Zithromax interacts with Haldol and Paliperidone.  Paged Wendy Christie NP to [phone] via answering service, who gave the override to continue prescription.  Reordered and sent to [facility] as [facility] is now closed.  Routed to [facility].  Carmen Jacobs RN  [facility]

## 2018-06-22 NOTE — TELEPHONE ENCOUNTER
Medications can all cause QT prolongation.    Pharmacy wants provider to be aware and see if PCP would like to continue with orders.    Indra Le RN

## 2018-06-22 NOTE — PROGRESS NOTES
SUBJECTIVE:                                                    Nena Tang is a 58 year old female who presents to clinic today for the following health issues:  [facility]: Don    Acute Illness   Acute illness concerns: body aches, cough  Onset: 1 1/2 weeks ago    Fever: no     Chills/Sweats: YES- chills    Headache (location?): YES    Sinus Pressure:no    Conjunctivitis:  no    Ear Pain: no    Rhinorrhea: YES    Congestion: YES    Sore Throat: YES     Cough: YES-productive of green sputum    Wheeze: no     Decreased Appetite: YES    Nausea: no     Vomiting: no     Diarrhea:  no     Dysuria/Freq.: no     Fatigue/Achiness: YES    Sick/Strep Exposure: no      Therapies Tried and outcome: none      Social History   Substance Use Topics     Smoking status: Never Smoker     Smokeless tobacco: Never Used     Alcohol use No      Comment: last month        Problem list and histories reviewed & adjusted, as indicated.  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 (H)     Illiterate     Irritable bowel syndrome     overweight - BMI >35     Takotsubo cardiomyopathy     CAD (coronary artery disease)     Restless legs syndrome (RLS)     CINDY (obstructive sleep apnea)- mild AHI 10.3     Verbal auditory hallucination     Chronic low back pain     Schizoaffective disorder, depressive type (H)     Migraine headache     HTN, goal below 140/90     Health Care Home     Lumbago     Cervicalgia     Cocaine abuse, episodic     Suicidal ideation     Esophageal reflux     Mild nonproliferative diabetic retinopathy (H)     Tardive dyskinesia     Alcohol use     Left cataract     Falls frequently     History of uterine cancer     Psychophysiological insomnia     Dysuria     Asymptomatic postmenopausal status     Abdominal pain,  right lower quadrant     Sepsis (H)     Pneumonia of right lower lobe due to infectious organism (H)     Infectious encephalopathy     Non-intractable vomiting with nausea     Acute respiratory failure with hypoxia (H)     Thoughts of self harm     Gastroenteritis     Posttraumatic stress disorder     Cervical cancer screening     Type 2 diabetes mellitus with stage 3 chronic kidney disease, with long-term current use of insulin (H)     Past Surgical History:   Procedure Laterality Date     C OOPHORECTORMY FOR RAHEL, W/BX  1983    UTERINE     CATARACT IOL, RT/LT Bilateral 2017     COLONOSCOPY N/A 3/16/2017    Procedure: COLONOSCOPY;  Surgeon: Traci Gonzalez MD;  Location:  GI     Coronary CTA  5/21/2014     HYSTERECTOMY  1983    uterine cancer yearly pap's per provider.     LAPAROSCOPIC CHOLECYSTECTOMY  2008     PHACOEMULSIFICATION CLEAR CORNEA WITH STANDARD INTRAOCULAR LENS IMPLANT Left 5/5/2017    Procedure: PHACOEMULSIFICATION CLEAR CORNEA WITH STANDARD INTRAOCULAR LENS IMPLANT;  LEFT EYE PHACOEMULSIFICATION CLEAR CORNEA WITH STANDARD INTRAOCULAR LENS IMPLANT ;  Surgeon: Tyra Diaz MD;  Location: [facility]     PHACOEMULSIFICATION CLEAR CORNEA WITH STANDARD INTRAOCULAR LENS IMPLANT Right 6/30/2017    Procedure: PHACOEMULSIFICATION CLEAR CORNEA WITH STANDARD INTRAOCULAR LENS IMPLANT;  RIGHT EYE PHACOEMULSIFICATION CLEAR CORNEA WITH STANDARD INTRAOCULAR LENS IMPLANT;  Surgeon: Tyra Diaz MD;  Location:  [facility]     RELEASE TRIGGER FINGER  10/11/2012    Left thumb. Procedure: RELEASE TRIGGER FINGER;  LEFT THUMB TRIGGER RELEASE;  Surgeon: Tay Langley MD;  Location:  SD     RELEASE TRIGGER FINGER Right 12/26/2016    Procedure: RELEASE TRIGGER FINGER;  Surgeon: Albino Castañeda MD;  Location:  OR       Social History   Substance Use Topics     Smoking status: Never Smoker     Smokeless tobacco: Never Used     Alcohol use No      Comment: last month     Family History   Problem  Relation Age of Onset     Cancer Mother      BLADDER     Respiratory Mother      COPD     GASTROINTESTINAL DISEASE Mother      CIRRHOSIS OF LI BOLIVAR     Alcohol/Drug Mother      Diabetes Mother      Hypertension Mother      Lipids Mother      C.A.D. Mother      Glaucoma Mother      Alcohol/Drug Sister      Mental Illness Sister      Alcohol/Drug Sister      Psychotic Disorder Sister      Cancer Maternal Grandmother      UNKNOWN TYPE     Cancer Brother      COLON     Cancer - colorectal Brother      IN HIS LATE 30S     Alcohol/Drug Brother       OF HEROIN OVERDOSE AT AGE 22 YRS     Macular Degeneration No family hx of            Current Outpatient Prescriptions   Medication Sig Dispense Refill     acetaminophen (TYLENOL) 500 MG tablet Take 2 tablets (1,000 mg) by mouth 3 times daily as needed for mild pain 100 tablet 0     ACETAMINOPHEN PO Take 1,000 mg by mouth every 6 hours as needed for pain or fever       albuterol (PROAIR HFA/PROVENTIL HFA/VENTOLIN HFA) 108 (90 BASE) MCG/ACT Inhaler Inhale 2 puffs into the lungs every 6 hours as needed for shortness of breath / dyspnea or wheezing 3 Inhaler 1     aspirin 81 MG EC tablet TAKE 1 TABLET (81MG) BY MOUTH DAILY 28 tablet 3     benztropine (COGENTIN) 0.5 MG tablet Take 1 tablet (0.5 mg) by mouth 2 times daily 60 tablet 1     blood glucose monitoring (ONE TOUCH DELICA) lancets Use to test blood sugar 2 times daily or as directed.  Ok to substitute alternative if insurance prefers. 150 each 11     blood glucose monitoring (ONETOUCH VERIO IQ) test strip Use to test blood sugar 4 times daily .  Ok to substitute alternative if insurance prefers. 200 strip 11     calcium carbonate (OS-ALLEN 600 MG [place] CA) 1500 (600 CA) MG tablet Take 1 tablet (1,500 mg) by mouth daily 180 tablet 3     clotrimazole (LOTRIMIN) 1 % cream Apply topically 2 times daily       DiphenhydrAMINE HCl (DIPHENDRYL PO) Take 25 mg by mouth every 6 hours as needed for itching       dulaglutide (TRULICITY)  1.5 MG/0.5ML pen Inject 1.5 mg Subcutaneous every 7 days 2 mL 3     gabapentin (NEURONTIN) 300 MG capsule TAKE 2 CAPSULES (600MG) BY MOUTH THREE TIMES A  capsule 0     glucose 4 G CHEW chewable tablet Take 2 every 15 minutes for blood sugar <70mg/dL. Recheck blood sugar every 15 minutes until above 70mg/dL, then eat a substantial meal. 20 tablet 1     ibuprofen (ADVIL,MOTRIN) 600 MG tablet Take 1 tablet (600 mg) by mouth every 4 hours as needed for moderate pain 60 tablet 0     insulin aspart (NOVOLOG FLEXPEN) 100 UNIT/ML injection Inject 20 Units Subcutaneous 3 times daily (with meals) Once daily, can add additional 5 units if BGs are >500mg/dL. 15 mL 11     insulin glargine (LANTUS) 100 UNIT/ML injection Inject 48 Units Subcutaneous At Bedtime 3 mL 11     insulin pen needle (NOVOFINE 30) 30G X 8 MM USE 4 DAILY OR AS DIRECTED 400 each 0     losartan (COZAAR) 100 MG tablet TAKE 1 TABLET (100MG) BY MOUTH DAILY 28 tablet 3     melatonin 5 MG CAPS Take 2 capsules by mouth daily At dinnertime 180 capsule 3     metoprolol succinate (TOPROL-XL) 25 MG 24 hr tablet Take 1 tablet (25 mg) by mouth daily 30 tablet 1     order for DME Equipment being ordered: 4 Wheeled walker with seat and brakes. 1 each 0     order for DME Diabetic Shoes 2 each 0     order for DME Hold Novolog if meals are refused. 1 each 0     paliperidone (INVEGA) 9 MG 24 hr tablet Take 1 tablet (9 mg) by mouth every morning 30 tablet 3     polyethylene glycol (MIRALAX/GLYCOLAX) powder TAKE 8.5 GRAMS (1/2 CAPFUL) BY MOUTH DAILY 527 g 3     prochlorperazine (COMPAZINE) 5 MG tablet Take 1 tablet (5 mg) by mouth every 6 hours as needed for nausea or vomiting 90 tablet 0     ranitidine (ZANTAC) 300 MG tablet TAKE 1 TABLET (300MG) BY MOUTH AT BEDTIME 90 tablet 1     senna-docusate (SENOKOT-S;PERICOLACE) 8.6-50 MG per tablet Take 1 tablet by mouth 2 times daily as needed for constipation 100 tablet 1     sertraline (ZOLOFT) 100 MG tablet Take 2 tablets (200  mg) by mouth daily 60 tablet 1     SUMAtriptan (IMITREX) 25 MG tablet Take 1-2 tablets (25-50 mg) by mouth at onset of headache for migraine May repeat in 2 hours. Max 8 tablets/24 hours. 12 tablet 1     topiramate (TOPAMAX) 25 MG tablet 25mg at bedtime for week 1, 50mg at bedtime for 1 week, and 75mg at bedtime thereafter 90 tablet 3     vitamin D3 (CHOLECALCIFEROL) 47450 UNITS capsule TAKE 1 CAPSULE (50,000 UNITS) MONTHLY 12 capsule 1     Allergies   Allergen Reactions     Imidazole Antifungals Hives     Tolerates diflucan     Ketoprofen Itching     Pruritis to topical     Lisinopril Hives     Metformin Other (See Comments)     Patient hospitalized for lactic acidosis - admitting provider suspectd caused by metformin     Metronidazole Hives     Posaconazole Hives     Tolerates diflucan     Recent Labs   Lab Test  05/28/18   1625  05/22/18   1434  02/12/18   1408  02/08/18   2155   01/13/18   2315  12/09/17   0748   11/07/17   0801   06/27/17   1358   12/29/16   0909   07/13/16   1350   07/14/15   1215   09/03/13   1156   A1C   --   6.2*  6.8*   --    --    --   8.9*   --   8.9*   < >  7.0*   < >   --    < >   --    < >  9.1*   < >  10.8*   LDL   --    --    --    --    --    --    --    --    --    --   64   --    --    --   137*   --   78   < >  90   HDL   --    --    --    --    --    --    --    --    --    --   37*   --    --    --    --    --   39*   --   37*   TRIG   --    --    --    --    --    --    --    --    --    --   267*   --    --    --    --    --   151*   --   171*   ALT  27   --    --   23   --   29   --    --   23   < >  32   < >  26   < >   --    < >   --    < >  38   CR  1.16*   --    --   1.09*   < >  1.62*   --    < >  0.88   < >  0.78   < >  0.72   < >   --    < >  0.67   < >  0.54   GFRESTIMATED  48*   --    --   52*   < >  33*   --    < >  66   < >  76   < >  83   < >   --    < >  >90  Non  GFR Calc     < >  >90   GFRESTBLACK  58*   --    --   63   < >  40*   --    < >   80   < >  >90   GFR Calc     < >  >90   GFR Calc     < >   --    < >  >90   GFR Calc     < >  >90   POTASSIUM  4.5   --    --   4.3   < >  4.0   --    < >  3.9   < >  4.3   < >  4.2   < >   --    < >  4.3   < >  4.4   TSH   --    --    --    --    --    --    --    --   3.21   --    --    --   2.24   < >   --    < >   --    < >  1.42    < > = values in this interval not displayed.      BP Readings from Last 3 Encounters:   06/07/18 157/89   06/04/18 94/58   05/28/18 129/70    Wt Readings from Last 3 Encounters:   06/07/18 237 lb 4.8 oz (107.6 kg)   06/04/18 235 lb (106.6 kg)   05/28/18 230 lb (104.3 kg)        Labs reviewed in EPIC  Problem list, Medication list, Allergies, and Medical/Social/Surgical histories reviewed in [facility] and updated as appropriate.     ROS: Constitutional, neuro, ENT, endocrine, pulmonary, cardiac, gastrointestinal, genitourinary, musculoskeletal, integument and psychiatric systems are negative, except as otherwise noted above in the HPI.       OBJECTIVE:                                                    LMP 01/06/2015  Body mass index is 45.14 kg/(m^2).   /58 (BP Location: Right arm)  Pulse 90  Temp 99.3  F (37.4  C) (Oral)  Resp 22  Wt 236 lb (107 kg)  LMP 01/06/2015  SpO2 95%  BMI 45.14 kg/m2    GENERAL: healthy, alert, well nourished, well hydrated, no distress  EYES: Eyes grossly normal to inspection, extraocular movements - intact, and PERRL  RESP: lungs with rhonchi, rales and wheezing, post neb somewhat improved.   CV: regular rates and rhythm, normal S1 S2, no S3 or S4 and no murmur    Mental Status Assessment:  Appearance:   Appropriate   Eye Contact:   Good   Psychomotor Behavior: Normal   Attitude:   Cooperative   Orientation:   All  Speech   Rate / Production: Normal    Volume:  Normal   Mood:    Normal  Affect:    Appropriate   Thought Content:  Clear   Thought Form:  Coherent  Logical   Insight:    Good   Attention  Span and Concentration:  limited  Recent and Remote Memory:  intact  Fund of Knowledge: appropriate  Muscle Strength and Tone: normal   Suicidal Ideation: reports no thoughts, no intention  Hallucination: Yes  Paranoid-Yes  Manic-No  Panic-No  Self harm-No      See [facility] notes     Diagnostic Test Results:  none     ASSESSMENT/PLAN:                                                    (J18.9) Pneumonia due to infectious organism, unspecified laterality, unspecified part of lung  (primary encounter diagnosis)  Comment: high risk for hospitalization due to previous sequelas, will go ahead and treat as pneumonia based on exam.   Plan: ipratropium - albuterol 0.5 mg/2.5 mg/3 mL         (DUONEB) 0.5-2.5 (3) MG/3ML neb solution,    azithromycin (ZITHROMAX) 250 MG         tablet      (R06.2) Wheezing  Comment:   Plan: predniSONE (DELTASONE) 20 MG tablet,         ipratropium - albuterol 0.5 mg/2.5 mg/3 mL         (DUONEB) 0.5-2.5 (3) MG/3ML neb solution,            (F25.1) Schizoaffective disorder, depressive type (H)  Comment: fairly stable, see [facility] note for further details  Plan: defer to PCP and C        [facility] PRIMARY [facility]ARE

## 2018-06-22 NOTE — PROGRESS NOTES
[facility]   June 22, 2018      Behavioral Health Clinician Progress Note    Patient Name: Nena Tang           Service Type:  Individual      Service Location:   Face to Face in Clinic     Session Start Time: 1:34pm  Session End Time: 1:55pm      Session Length: 16 - 37      Attendees: Patient    Visit Activities (Refresh list every visit): [facility] [employer]    Diagnostic Assessment Date: 1-23-18  Treatment Plan Review Date: Not completed yet  See Flowsheets for today's PHQ-9 and TAMIA-7 results  Previous PHQ-9:   PHQ-9 SCORE 1/2/2017 2/3/2017 3/13/2018   Total Score - - -   Total Score - - -   Total Score 0 0 14     Previous TAMIA-7:   TAMIA-7 SCORE 1/2/2017 2/3/2017 3/13/2018   Total Score - - -   Total Score 0 0 17   Total Score - - -       ALEXANDRA LEVEL:  ALEXANDRA Score (Last Two) 8/30/2011 6/9/2014   ALEXANDRA Raw Score 42 37   Activation Score 66 49.9   ALEXANDRA Level 3 2       DATA  Extended Session (60+ minutes): No  Interactive Complexity: No  Crisis: No  [facility] Patient: No    Treatment Objective(s) Addressed in This Session:  Target Behavior(s): disease management/lifestyle changes mental health    Depressed Mood: Increase interest, engagement, and pleasure in doing things  Decrease frequency and intensity of feeling down, depressed, hopeless  Improve quantity and quality of night time sleep / decrease daytime naps  Feel less tired and more energy during the day   Identify negative self-talk and behaviors: challenge core beliefs, myths, and actions  Improve concentration, focus, and mindfulness in daily activities   Decrease thoughts that you'd be better off dead or of suicide / self-harm  Anxiety: will experience a reduction in anxiety, will develop more effective coping skills to manage anxiety symptoms, will develop healthy cognitive patterns and beliefs and will increase ability to function adaptively  Alcohol / Substance Use: continue to make healthy choices regarding substance use and engage in  activities / supportive services that promote sobriety  Thought Disturbance: will develop skills to more effectively manage symptoms, will develop more effective support systems and will continue to take medications as prescribed    Current Stressors / Issues:    The patient reported that she has not CPAP machine due to her fear of the man-we had discussion how she can use use self talk and mindful distraction with the help of her Caodaism beliefs-she talked with PCP about her current symptoms and the result was that she would be assessed to find treatment-low appetite-regarding sleeping she has hard time falling asleep and waking during the night-regarding mood the patient reported feeling low-no suicidal thoughts-able to feel ronal-worrying too much-no panic attacks-visual and auditory hallucination and they are stressful            Progress on Treatment Objective(s) / Homework:  No improvement - PREPARATION (Decided to change - considering how); Intervened by negotiating a change plan and determining options / strategies for behavior change, identifying triggers, exploring social supports, and working towards setting a date to begin behavior change    Motivational Interviewing    MI Intervention: Expressed Empathy/Understanding, Supported Autonomy, Collaboration, Evocation, Permission to raise concern or advise, Open-ended questions, Reflections: simple and complex, Rolled with resistance: Emphasized patient autonomy, Simple reflection and Evoked patient agenda and Change talk (evoked)     Change Talk Expressed by the Patient: Desire to change Reasons to change Need to change Committment to change Activation Taking steps    Provider Response to Change Talk: E - Evoked more info from patient about behavior change, A - Affirmed patient's thoughts, decisions, or attempts at behavior change, R - Reflected patient's change talk and S - Summarized patient's change talk statements      Care Plan review completed:  No    Medication Review:  No changes to current psychiatric medication(s)   Medication Compliance:  NA    Changes in Health Issues:   None reported     Chemical Use Review:   Substance Use: Chemical use reviewed, no active concerns identified      Tobacco Use: No current tobacco use.      Assessment: Current Emotional / Mental Status (status of significant symptoms):  Risk status (Self / Other harm or suicidal ideation)  Patient has had a history of suicidal ideation: yes  Patient denies current fears or concerns for personal safety.  Patient reports the following current or recent suicidal ideation or behaviors: no intent to harm the self at this time.  Patient denies current or recent homicidal ideation or behaviors.  Patient denies current or recent self injurious behavior or ideation.  Patient denies other safety concerns.  A safety and risk management plan has not been developed at this time, however patient was encouraged to call [place] [phone] should there be a change in any of these risk factors.    Appearance:   Appropriate   Eye Contact:   Good   Psychomotor Behavior: Normal   Attitude:   Cooperative   Orientation:   All  Speech   Rate / Production: Normal    Volume:  Normal   Mood:    Normal  Affect:    Appropriate  Bright  Worrisome   Thought Content:  Clear   Thought Form:  Coherent   Insight:    Fair     Diagnoses:  1. Schizoaffective disorder, depressive type (H)    2. Posttraumatic stress disorder    3. Alcohol use        Collateral Reports Completed:  Not Applicable    Plan: (Homework, other):  Patient was given information about behavioral services and encouraged to schedule a follow up appointment with the clinic [facility] as needed.  She was also given information about mental health symptoms and treatment options .  CD Recommendations: Maintain Sobriety.    BIN George, [facility]      ______________________________________________________________________

## 2018-06-23 ENCOUNTER — TELEPHONE (OUTPATIENT)
Dept: FAMILY MEDICINE | Facility: CLINIC | Age: 58
End: 2018-06-23

## 2018-06-23 ENCOUNTER — NURSE TRIAGE (OUTPATIENT)
Dept: NURSING | Facility: CLINIC | Age: 58
End: 2018-06-23

## 2018-06-23 NOTE — TELEPHONE ENCOUNTER
Patient won't have access to a nebulizer machine until Monday 6-25-18.  Paged Wendy Christie and received order for an Albuterol inhaler with a note to the pharmacy why. Patient may have to pay iupfront if insurance denies.  Routed to [facility].  Carmen Jacobs RN  [facility]

## 2018-06-23 NOTE — LETTER
[facility]  Critical access hospital[address] [facility] [address]  [phone]          June 25, 2018    Nena Tang                                                                                                                     [address] [facility] [address]          To Whom it may concern.      Orders from 6/22/18 and 6/23/18.  Note these orders are for records only, Orders should have been implemented over the weekend.    Clinic Action Needed:  Request for orders to be faxed.  Reason for Call:  Caregiver states insurance doesn't cover albuterol inhaler; patient has as needed ventolin inhaler.  Asking if needs another inhaler prescribed.  Caregiver also states patient didn't receive 48 units Lantus insulin last evening and also didn't check blood sugar at that time.  Blood sugar now is 314; asking for direction regarding Lantus insulin.  [facility] contacted on call, BLAIR Christie CNP, via cell phone, at 8:28AM, and left message to return call to [facility].  On call provider returned call and gave order to administer 48 units of Lantus now and to check blood sugar at HS and if below 200 administer 24 units of Lantus, otherwise, administer 48 units of Lantus as prescribed.  Use prn ventolin inhaler at least 3 times daily if short of breath.  [facility] called caregiver with above orders which he read back to [facility].  Caregiver requesting orders be faxed to him at [phone] when clinic opens.    Routed to:  Clinic Pool        Sincerely,         Jemima Tang CNP, APRN

## 2018-06-23 NOTE — TELEPHONE ENCOUNTER
Caregiver states insurance doesn't cover albuterol inhaler; patient has as needed ventolin inhaler.  Asking if needs another inhaler prescribed.  Caregiver also states patient didn't receive 48 units Lantus insulin last evening and also didn't check blood sugar at that time.  Blood sugar now is 314; asking for direction regarding Lantus insulin.  [facility] contacted on call, BLAIR Christie CNP, via cell phone, at 8:28AM, and left message to return call to [facility].  On call provider returned call and gave order to administer 48 units of Lantus now and to check blood sugar at HS and if below 200 administer 24 units of Lantus, otherwise, administer 48 units of Lantus as prescribed.  Use prn ventolin inhaler at least 3 times daily if short of breath.  [facility] called caregiver with above orders which he read back to [facility].  Caregiver requesting orders be faxed to him at [phone] when clinic opens.  [facility] routed to Clinic Pool.  Reason for Disposition    Caller has URGENT medication or insulin pump question and triager unable to answer question    Additional Information    Negative: Unconscious or difficult to awaken    Negative: Acting confused (e.g., disoriented, slurred speech)    Negative: Very weak (e.g., can't stand)    Negative: Sounds like a life-threatening emergency to the triager    Negative: [1] Vomiting AND [2] signs of dehydration (e.g., very dry mouth, lightheaded, etc.)    Negative: [1] Blood glucose > 240 mg/dl (13 mmol/l) AND [2] rapid breathing    Negative: Blood glucose > 500 mg/dl (27.5 mmol/l)    Negative: [1] Blood glucose > 240 mg/dl (13 mmol/l) AND [2] urine ketones moderate-large (or more than 1+)    Negative: [1] Blood glucose > 240 mg/dl (13 mmol/l) AND [2] blood ketones > 1.5 mmol/l    Negative: [1] Blood glucose > 240 mg/dl (13 mmol/l) AND [2] vomiting AND [3] unable to check for ketones (in blood or urine)    Negative: [1] New onset Diabetes suspected (e.g., frequent urination, weak, weight loss) AND  [2] vomiting or rapid breathing    Negative: Vomiting lasts > 4 hours    Negative: Patient sounds very sick or weak to the triager    Negative: Fever > 100.5 F (38.1 C)    Negative: Blood glucose > 400 mg/dl (22 mmol/l)    Negative: [1] Blood glucose > 300 mg/dl (16.5 mmol/l) AND [2] two or more times in a row    Negative: Urine ketones moderate - large    Protocols used: DIABETES - HIGH BLOOD SUGAR-ADULT-AH

## 2018-06-23 NOTE — TELEPHONE ENCOUNTER
Clinic Action Needed:  Request for orders to be faxed.  Reason for Call:  Caregiver states insurance doesn't cover albuterol inhaler; patient has as needed ventolin inhaler.  Asking if needs another inhaler prescribed.  Caregiver also states patient didn't receive 48 units Lantus insulin last evening and also didn't check blood sugar at that time.  Blood sugar now is 314; asking for direction regarding Lantus insulin.  [facility] contacted on call, BLAIR Christie CNP, via cell phone, at 8:28AM, and left message to return call to [facility].  On call provider returned call and gave order to administer 48 units of Lantus now and to check blood sugar at HS and if below 200 administer 24 units of Lantus, otherwise, administer 48 units of Lantus as prescribed.  Use prn ventolin inhaler at least 3 times daily if short of breath.  [facility] called caregiver with above orders which he read back to [facility].  Caregiver requesting orders be faxed to him at [phone] when clinic opens.    Routed to:  Clinic Pool

## 2018-06-29 ENCOUNTER — APPOINTMENT (OUTPATIENT)
Dept: GENERAL RADIOLOGY | Facility: CLINIC | Age: 58
End: 2018-06-29
Attending: EMERGENCY MEDICINE
Payer: MEDICARE

## 2018-06-29 ENCOUNTER — OFFICE VISIT (OUTPATIENT)
Dept: FAMILY MEDICINE | Facility: CLINIC | Age: 58
End: 2018-06-29
Payer: MEDICARE

## 2018-06-29 ENCOUNTER — OFFICE VISIT (OUTPATIENT)
Dept: BEHAVIORAL HEALTH | Facility: CLINIC | Age: 58
End: 2018-06-29
Payer: MEDICARE

## 2018-06-29 ENCOUNTER — HOSPITAL ENCOUNTER (EMERGENCY)
Facility: CLINIC | Age: 58
Discharge: HOME | End: 2018-06-29
Attending: EMERGENCY MEDICINE | Admitting: EMERGENCY MEDICINE
Payer: MEDICARE

## 2018-06-29 VITALS
RESPIRATION RATE: 26 BRPM | DIASTOLIC BLOOD PRESSURE: 66 MMHG | OXYGEN SATURATION: 97 % | HEART RATE: 76 BPM | SYSTOLIC BLOOD PRESSURE: 106 MMHG | TEMPERATURE: 98.9 F

## 2018-06-29 VITALS
BODY MASS INDEX: 44.37 KG/M2 | WEIGHT: 232 LBS | SYSTOLIC BLOOD PRESSURE: 128 MMHG | OXYGEN SATURATION: 95 % | RESPIRATION RATE: 22 BRPM | TEMPERATURE: 98.5 F | DIASTOLIC BLOOD PRESSURE: 58 MMHG | HEART RATE: 92 BPM

## 2018-06-29 DIAGNOSIS — R05.9 COUGH: Primary | ICD-10-CM

## 2018-06-29 DIAGNOSIS — R06.2 WHEEZING: ICD-10-CM

## 2018-06-29 DIAGNOSIS — R05.9 COUGH: ICD-10-CM

## 2018-06-29 DIAGNOSIS — J18.9 PNEUMONIA DUE TO INFECTIOUS ORGANISM, UNSPECIFIED LATERALITY, UNSPECIFIED PART OF LUNG: ICD-10-CM

## 2018-06-29 DIAGNOSIS — Z53.9 ERRONEOUS ENCOUNTER--DISREGARD: Primary | ICD-10-CM

## 2018-06-29 LAB
ANION GAP SERPL CALCULATED.3IONS-SCNC: 10 MMOL/L (ref 3–14)
BASOPHILS # BLD AUTO: 0 10E9/L (ref 0–0.2)
BASOPHILS NFR BLD AUTO: 0.4 %
BUN SERPL-MCNC: 28 MG/DL (ref 7–30)
CALCIUM SERPL-MCNC: 9.1 MG/DL (ref 8.5–10.1)
CHLORIDE SERPL-SCNC: 107 MMOL/L (ref 94–109)
CO2 SERPL-SCNC: 22 MMOL/L (ref 20–32)
CREAT SERPL-MCNC: 1.06 MG/DL (ref 0.52–1.04)
DIFFERENTIAL METHOD BLD: ABNORMAL
EOSINOPHIL # BLD AUTO: 0.2 10E9/L (ref 0–0.7)
EOSINOPHIL NFR BLD AUTO: 2 %
ERYTHROCYTE [DISTWIDTH] IN BLOOD BY AUTOMATED COUNT: 13.5 % (ref 10–15)
GFR SERPL CREATININE-BSD FRML MDRD: 53 ML/MIN/1.7M2
GLUCOSE SERPL-MCNC: 104 MG/DL (ref 70–99)
HCT VFR BLD AUTO: 33.5 % (ref 35–47)
HGB BLD-MCNC: 10.7 G/DL (ref 11.7–15.7)
IMM GRANULOCYTES # BLD: 0 10E9/L (ref 0–0.4)
IMM GRANULOCYTES NFR BLD: 0.5 %
LYMPHOCYTES # BLD AUTO: 2.9 10E9/L (ref 0.8–5.3)
LYMPHOCYTES NFR BLD AUTO: 39.2 %
MCH RBC QN AUTO: 29.6 PG (ref 26.5–33)
MCHC RBC AUTO-ENTMCNC: 31.9 G/DL (ref 31.5–36.5)
MCV RBC AUTO: 93 FL (ref 78–100)
MONOCYTES # BLD AUTO: 0.7 10E9/L (ref 0–1.3)
MONOCYTES NFR BLD AUTO: 8.8 %
NEUTROPHILS # BLD AUTO: 3.6 10E9/L (ref 1.6–8.3)
NEUTROPHILS NFR BLD AUTO: 49.1 %
NRBC # BLD AUTO: 0 10*3/UL
NRBC BLD AUTO-RTO: 0 /100
PLATELET # BLD AUTO: 239 10E9/L (ref 150–450)
POTASSIUM SERPL-SCNC: 4.7 MMOL/L (ref 3.4–5.3)
RBC # BLD AUTO: 3.62 10E12/L (ref 3.8–5.2)
SODIUM SERPL-SCNC: 139 MMOL/L (ref 133–144)
TROPONIN I SERPL-MCNC: <0.015 UG/L (ref 0–0.04)
WBC # BLD AUTO: 7.4 10E9/L (ref 4–11)

## 2018-06-29 PROCEDURE — 85025 COMPLETE CBC W/AUTO DIFF WBC: CPT | Performed by: EMERGENCY MEDICINE

## 2018-06-29 PROCEDURE — 99214 OFFICE O/P EST MOD 30 MIN: CPT | Performed by: NURSE PRACTITIONER

## 2018-06-29 PROCEDURE — 71046 X-RAY EXAM CHEST 2 VIEWS: CPT

## 2018-06-29 PROCEDURE — 93005 ELECTROCARDIOGRAM TRACING: CPT | Performed by: EMERGENCY MEDICINE

## 2018-06-29 PROCEDURE — 93010 ELECTROCARDIOGRAM REPORT: CPT | Mod: Z6 | Performed by: EMERGENCY MEDICINE

## 2018-06-29 PROCEDURE — 99285 EMERGENCY DEPT VISIT HI MDM: CPT | Mod: 25 | Performed by: EMERGENCY MEDICINE

## 2018-06-29 PROCEDURE — 80048 BASIC METABOLIC PNL TOTAL CA: CPT | Performed by: EMERGENCY MEDICINE

## 2018-06-29 PROCEDURE — 84484 ASSAY OF TROPONIN QUANT: CPT | Performed by: EMERGENCY MEDICINE

## 2018-06-29 RX ORDER — CODEINE PHOSPHATE AND GUAIFENESIN 10; 100 MG/5ML; MG/5ML
1 SOLUTION ORAL EVERY 4 HOURS PRN
Qty: 120 ML | Refills: 0 | Status: SHIPPED | OUTPATIENT
Start: 2018-06-29 | End: 2018-08-22

## 2018-06-29 RX ORDER — CODEINE PHOSPHATE AND GUAIFENESIN 10; 100 MG/5ML; MG/5ML
1 SOLUTION ORAL EVERY 4 HOURS PRN
Qty: 120 ML | Refills: 0 | Status: SHIPPED | OUTPATIENT
Start: 2018-06-29 | End: 2018-06-29

## 2018-06-29 ASSESSMENT — ENCOUNTER SYMPTOMS
COUGH: 1
SINUS PRESSURE: 1
SHORTNESS OF BREATH: 1
FEVER: 0
DIZZINESS: 1

## 2018-06-29 NOTE — PATIENT INSTRUCTIONS
-Will call for transport and have you wheeled down to the Emergency room for further assessment and evaluation of the chest pain.     -Follow-up in about one week after the ED visit.

## 2018-06-29 NOTE — ED AVS SNAPSHOT
[facility], Emergency Department    [address]    [facility] [address]    Phone:  [phone]    Fax:  [phone]                                       Nena Tang   MRN: [id number]    Department:  [facility], Emergency Department   Date of Visit:  6/29/2018           Patient Information     Date Of Birth          1960        Your diagnoses for this visit were:     Cough        You were seen by Bossman Robles MD.        Discharge Instructions       Please make an appointment to follow up with Your Primary Care Provider in 7-10 days for reevaluation.    Augmentin as directed.    Robitussin AC as directed for cough.    Tylenol for pain.    Return to the emergency department for any problems.      Your next 10 appointments already scheduled     Jul 16, 2018 10:30 AM CDT   Return Visit with BIN Mondragon   [facility] ([facility])    [address]  [address]  [facility] [address] [phone]            Jul 16, 2018 10:30 AM CDT   Return Visit with [facility] ([facility])    [address]  [address]  [facility] [address] [phone]            Aug 30, 2018  3:30 PM CDT   RETURN RETINA with Tiburcio Chacon MD   [facility] ([facility])    [address]  [facility] [address]   [phone]            Sep 07, 2018  3:00 PM CDT   Return Visit with Kraig Pedersen MD   [facility] ([facility])    [address]  [facility] [address]   [phone]            Sep 10, 2018 10:45 AM CDT   (Arrive by 10:30 AM)   Return Weight Management Visit with Debbie Germain PA-C   [facility] ([facility])    [address] [place] [address]  [facility] [address] [phone]              24 Hour Appointment Hotline       To make an appointment at any [facility], call [phone] ([phone]). If you don't have a family doctor or clinic, we will help you find one. [place] clinics are conveniently located to serve the needs of you and your family.             Review of your medicines      START taking        Dose / Directions Last dose taken    amoxicillin-clavulanate 875-125 MG per tablet   Commonly known as:  AUGMENTIN   Dose:  1 tablet   Quantity:  20 tablet        Take 1 tablet by mouth 2 times daily for 10 days   Refills:  0          Our records show that you are taking the medicines listed below. If these are incorrect, please call your family doctor or clinic.        Dose / Directions Last dose taken    * ACETAMINOPHEN PO   Dose:  1000 mg        Take 1,000 mg by mouth every 6 hours as needed for pain or fever   Refills:  0        * acetaminophen 500 MG tablet   Commonly known as:  TYLENOL   Dose:  1000 mg   Quantity:  100 tablet        Take 2 tablets (1,000 mg) by mouth 3 times daily as needed for mild pain   Refills:  0        albuterol 108 (90 Base) MCG/ACT Inhaler   Commonly known as:  PROAIR HFA/PROVENTIL HFA/VENTOLIN HFA   Dose:  2 puff   Quantity:  1 Inhaler        Inhale 2 puffs into the lungs every 6 hours as needed for shortness of breath / dyspnea or wheezing   Refills:  0        aspirin 81 MG EC tablet   Quantity:  28 tablet        TAKE 1 TABLET (81MG) BY MOUTH DAILY   Refills:  3        benztropine 0.5 MG tablet   Commonly known as:  COGENTIN   Dose:  0.5 mg   Quantity:  60 tablet        Take 1 tablet (0.5 mg) by mouth 2 times daily   Refills:  1        blood glucose monitoring lancets   Quantity:  150 each        Use to test blood sugar 2 times daily or as directed.  Ok to substitute alternative if insurance prefers.   Refills:  11        blood glucose monitoring test strip   Commonly known as:  ONETOUCH  VERIO IQ   Quantity:  200 strip        Use to test blood sugar 4 times daily .  Ok to substitute alternative if insurance prefers.   Refills:  11        calcium carbonate 1500 (600 Ca) MG tablet   Commonly known as:  OS-ALLEN 600 mg [place]. Ca   Dose:  1500 mg   Quantity:  180 tablet        Take 1 tablet (1,500 mg) by mouth daily   Refills:  3        clotrimazole 1 % cream   Commonly known as:  LOTRIMIN        Apply topically 2 times daily   Refills:  0        DIPHENDRYL PO   Dose:  25 mg        Take 25 mg by mouth every 6 hours as needed for itching   Refills:  0        dulaglutide 1.5 MG/0.5ML pen   Commonly known as:  TRULICITY   Dose:  1.5 mg   Quantity:  2 mL        Inject 1.5 mg Subcutaneous every 7 days   Refills:  3        gabapentin 300 MG capsule   Commonly known as:  NEURONTIN   Quantity:  168 capsule        TAKE 2 CAPSULES (600MG) BY MOUTH THREE TIMES A DAY   Refills:  0        glucose 4 g Chew chewable tablet   Quantity:  20 tablet        Take 2 every 15 minutes for blood sugar <70mg/dL. Recheck blood sugar every 15 minutes until above 70mg/dL, then eat a substantial meal.   Refills:  1        guaiFENesin-codeine 100-10 MG/5ML Soln solution   Commonly known as:  ROBITUSSIN AC   Dose:  1 tsp.   Quantity:  120 mL        Take 5 mLs by mouth every 4 hours as needed for cough   Refills:  0        ibuprofen 600 MG tablet   Commonly known as:  ADVIL/MOTRIN   Dose:  600 mg   Quantity:  60 tablet        Take 1 tablet (600 mg) by mouth every 4 hours as needed for moderate pain   Refills:  0        insulin aspart 100 UNIT/ML injection   Commonly known as:  NovoLOG FLEXPEN   Dose:  20 Units   Quantity:  15 mL        Inject 20 Units Subcutaneous 3 times daily (with meals) Once daily, can add additional 5 units if BGs are >500mg/dL.   Refills:  11        insulin glargine 100 UNIT/ML injection   Commonly known as:  LANTUS   Dose:  48 Units   Quantity:  3 mL        Inject 48 Units Subcutaneous At Bedtime   Refills:  11         insulin pen needle 30G X 8 MM   Commonly known as:  NOVOFINE 30   Quantity:  400 each        USE 4 DAILY OR AS DIRECTED   Refills:  0        ipratropium - albuterol 0.5 mg/2.5 mg/3 mL 0.5-2.5 (3) MG/3ML neb solution   Commonly known as:  DUONEB   Dose:  1 vial   Quantity:  30 vial        Take 1 vial (3 mLs) by nebulization every 6 hours as needed for shortness of breath / dyspnea or wheezing   Refills:  0        losartan 100 MG tablet   Commonly known as:  COZAAR   Quantity:  28 tablet        TAKE 1 TABLET (100MG) BY MOUTH DAILY   Refills:  3        melatonin 5 MG Caps   Dose:  2 capsule   Quantity:  180 capsule        Take 2 capsules by mouth daily At dinnertime   Refills:  3        metoprolol succinate 25 MG 24 hr tablet   Commonly known as:  TOPROL-XL   Dose:  25 mg   Quantity:  30 tablet        Take 1 tablet (25 mg) by mouth daily   Refills:  1        * order for DME   Quantity:  1 each        Hold Novolog if meals are refused.   Refills:  0        * order for DME   Quantity:  2 each        Diabetic Shoes   Refills:  0        order for DME   Quantity:  1 each        Equipment being ordered: 4 Wheeled walker with seat and brakes.   Refills:  0        order for DME   Quantity:  1 Units        Equipment being ordered: Nebulizer Machine   Refills:  0        paliperidone 9 MG 24 hr tablet   Commonly known as:  INVEGA   Dose:  9 mg   Quantity:  30 tablet        Take 1 tablet (9 mg) by mouth every morning   Refills:  3        polyethylene glycol powder   Commonly known as:  MIRALAX/GLYCOLAX   Quantity:  527 g        TAKE 8.5 GRAMS (1/2 CAPFUL) BY MOUTH DAILY   Refills:  3        prochlorperazine 5 MG tablet   Commonly known as:  COMPAZINE   Dose:  5 mg   Quantity:  90 tablet        Take 1 tablet (5 mg) by mouth every 6 hours as needed for nausea or vomiting   Refills:  0        ranitidine 300 MG tablet   Commonly known as:  ZANTAC   Quantity:  90 tablet        TAKE 1 TABLET (300MG) BY MOUTH AT BEDTIME   Refills:  1         senna-docusate 8.6-50 MG per tablet   Commonly known as:  SENOKOT-S;PERICOLACE   Dose:  1 tablet   Quantity:  100 tablet        Take 1 tablet by mouth 2 times daily as needed for constipation   Refills:  1        sertraline 100 MG tablet   Commonly known as:  ZOLOFT   Dose:  200 mg   Quantity:  60 tablet        Take 2 tablets (200 mg) by mouth daily   Refills:  1        SUMAtriptan 25 MG tablet   Commonly known as:  IMITREX   Dose:  25-50 mg   Quantity:  12 tablet        Take 1-2 tablets (25-50 mg) by mouth at onset of headache for migraine May repeat in 2 hours. Max 8 tablets/24 hours.   Refills:  1        topiramate 25 MG tablet   Commonly known as:  TOPAMAX   Quantity:  90 tablet        25mg at bedtime for week 1, 50mg at bedtime for 1 week, and 75mg at bedtime thereafter   Refills:  3        vitamin D3 50383 UNITS capsule   Commonly known as:  CHOLECALCIFEROL   Quantity:  12 capsule        TAKE 1 CAPSULE (50,000 UNITS) MONTHLY   Refills:  1        * Notice:  This list has 4 medication(s) that are the same as other medications prescribed for you. Read the directions carefully, and ask your doctor or other care provider to review them with you.            Prescriptions were sent or printed at these locations (2 Prescriptions)                   Other Prescriptions                Printed at Department/Unit printer (2 of 2)         amoxicillin-clavulanate (AUGMENTIN) 875-125 MG per tablet               guaiFENesin-codeine (ROBITUSSIN AC) 100-10 MG/5ML SOLN solution                Procedures and tests performed during your visit     Basic metabolic panel    CBC with platelets differential    Chest XR,  PA & LAT    EKG 12 lead    Troponin I      Orders Needing Specimen Collection     None      Pending Results     No orders found from 6/27/2018 to 6/30/2018.            Pending Culture Results     No orders found from 6/27/2018 to 6/30/2018.            Pending Results Instructions     If you had any lab results  that were not finalized at the time of your Discharge, you can call the ED Lab Result RN at [phone]. You will be contacted by this team for any positive Lab results or changes in treatment. The nurses are available 7 days a week from 10A to 6:30P.  You can leave a message 24 hours per day and they will return your call.        Thank you for choosing [facility]       Thank you for choosing [facility] for your care. Our goal is always to provide you with excellent care. Hearing back from our patients is one way we can continue to improve our services. Please take a few minutes to complete the written survey that you may receive in the mail after you visit with us. Thank you!        [employer] Information     [employer] gives you secure access to your electronic health record. If you see a primary care provider, you can also send messages to your care team and make appointments. If you have questions, please call your primary care clinic.  If you do not have a primary care provider, please call [phone] and they will assist you.        Care EveryWhere ID     This is your Care EveryWhere ID. This could be used by other organizations to access your [facility] medical records  [id number]        Equal Access to Services     RAMESH GARRIDO : Azul Rios, luis napier, lorena galvin . So [facility] [phone].    ATENCIÓN: Si habla español, tiene a trinh disposición servicios gratuitos de asistencia lingüística. Fatouame al [phone].    We comply with applicable federal civil rights laws and [place] laws. We do not discriminate on the basis of race, color, national origin, age, disability, sex, sexual orientation, or gender identity.            After Visit Summary       This is your record. Keep this with you and show to your community pharmacist(s) and doctor(s) at your next visit.

## 2018-06-29 NOTE — MR AVS SNAPSHOT
After Visit Summary   6/29/2018    Nena Tang    MRN: [id number]           Patient Information     Date Of Birth          1960        Visit Information        Provider Department      6/29/2018 11:30 AM Richardson Lopez, [place]        Today's Diagnoses     ERRONEOUS ENCOUNTER--DISREGARD    -  1       Follow-ups after your visit        Your next 10 appointments already scheduled     Jul 16, 2018 10:30 AM CDT   Return Visit with BIN Mondragon   [facility] ([facility])    [address]  [address]  [facility] [address]   [phone]            Jul 16, 2018 10:30 AM CDT   Return Visit with ART Arias CNP   [facility]    [address]  [address]  [facility] [address] [phone]            Aug 30, 2018  3:30 PM CDT   RETURN RETINA with Tiburcio Chacon MD   [facility] ([facility])    [address]  [facility] [address]   [phone]            Sep 07, 2018  3:00 PM CDT   Return Visit with Kraig Pedersen MD   [facility] ([facility])    [address]  [facility] [address] [phone]            Sep 10, 2018 10:45 AM CDT   (Arrive by 10:30 AM)   Return Weight Management Visit with Debbie Germain PA-C   [facility] ([facility])    [address]  4th Floor  [facility] [id number] [phone]              Who to contact     If you have questions or need follow up information about today's clinic visit or your schedule please contact [facility] directly at [phone].  Normal or non-critical lab and imaging results will be communicated to you by  MyChart, letter or phone within 4 business days after the clinic has received the results. If you do not hear from us within 7 days, please contact the clinic through [employer] or phone. If you have a critical or abnormal lab result, we will notify you by phone as soon as possible.  Submit refill requests through [employer] or call your pharmacy and they will forward the refill request to us. Please allow 3 business days for your refill to be completed.          Additional Information About Your Visit        [employer] Information     [employer] gives you secure access to your electronic health record. If you see a primary care provider, you can also send messages to your care team and make appointments. If you have questions, please call your primary care clinic.  If you do not have a primary care provider, please call [phone] and they will assist you.        Care EveryWhere ID     This is your Care EveryWhere ID. This could be used by other organizations to access your [facility] medical records  [id number]        Your Vitals Were     Last Period                   01/06/2015            Blood Pressure from Last 3 Encounters:   06/29/18 106/66   06/29/18 128/58   06/22/18 128/58    Weight from Last 3 Encounters:   06/29/18 232 lb (105.2 kg)   06/22/18 236 lb (107 kg)   06/07/18 237 lb 4.8 oz (107.6 kg)              Today, you had the following     No orders found for display         Today's Medication Changes          These changes are accurate as of 6/29/18 11:59 PM.  If you have any questions, ask your nurse or doctor.               Start taking these medicines.        Dose/Directions    order for DME   Used for:  Wheezing, Cough, Pneumonia due to infectious organism, unspecified laterality, unspecified part of lung   Started by:  Rowena Haas APRN CNP        Equipment being ordered: Nebulizer Machine   Quantity:  1 Units   Refills:  0            Where to get your medicines      Some of these will need a paper  prescription and others can be bought over the counter.  Ask your nurse if you have questions.     Bring a paper prescription for each of these medications     order for DME                Primary Care Provider Office Phone # Fax #    ART Arias [phone]       [address]  [facility] [address]        Goals        General    Medical (pt-stated)     Notes - Note created  7/7/2016  9:15 AM by Chelsea Pulido, RN    Get blood sugars under control    Improve medication compliance    As of today's date 7/7/2016 goal is met at 0 - 25%.   Goal Status:  Active          Equal Access to Services     [facility]: Hadii aad ku hadasho Soomaali, waaxda luqadaha, qaybta kaalmada adeegyada, waxmichelle toscano hayaafredi ellison . So [facility] [phone].    ATENCIÓN: Si habla español, tiene a trinh disposición servicios gratuitos de asistencia lingüística. Llame al [phone].    We comply with applicable federal civil rights laws and [place] laws. We do not discriminate on the basis of race, color, national origin, age, disability, sex, sexual orientation, or gender identity.            Thank you!     Thank you for choosing [facility]  for your care. Our goal is always to provide you with excellent care. Hearing back from our patients is one way we can continue to improve our services. Please take a few minutes to complete the written survey that you may receive in the mail after your visit with us. Thank you!             Your Updated Medication List - Protect others around you: Learn how to safely use, store and throw away your medicines at www.disposemymeds.org.          This list is accurate as of 6/29/18 11:59 PM.  Always use your most recent med list.                   Brand Name Dispense Instructions for use Diagnosis    * ACETAMINOPHEN PO      Take 1,000 mg by mouth every 6 hours as needed for pain or fever        * acetaminophen 500 MG tablet     TYLENOL    100 tablet    Take 2 tablets (1,000 mg) by mouth 3 times daily as needed for mild pain    Chronic low back pain, unspecified back pain laterality, with sciatica presence unspecified       albuterol 108 (90 Base) MCG/ACT Inhaler    PROAIR HFA/PROVENTIL HFA/VENTOLIN HFA    1 Inhaler    Inhale 2 puffs into the lungs every 6 hours as needed for shortness of breath / dyspnea or wheezing    Pneumonia, organism unspecified(486)       amoxicillin-clavulanate 875-125 MG per tablet    AUGMENTIN    20 tablet    Take 1 tablet by mouth 2 times daily for 10 days        aspirin 81 MG EC tablet     28 tablet    TAKE 1 TABLET (81MG) BY MOUTH DAILY    Coronary artery disease involving native heart with angina pectoris, unspecified vessel or lesion type (H)       benztropine 0.5 MG tablet    COGENTIN    60 tablet    Take 1 tablet (0.5 mg) by mouth 2 times daily    Extrapyramidal and movement disorder       blood glucose monitoring lancets     150 each    Use to test blood sugar 2 times daily or as directed.  Ok to substitute alternative if insurance prefers.    Type 2 diabetes mellitus with diabetic neuropathy, with long-term current use of insulin (H)       blood glucose monitoring test strip    ONETOUCH VERIO IQ    200 strip    Use to test blood sugar 4 times daily .  Ok to substitute alternative if insurance prefers.    Type 2 diabetes mellitus with diabetic neuropathy, with long-term current use of insulin (H)       calcium carbonate 1500 (600 Ca) MG tablet    OS-ALLEN 600 mg [place]. Ca    180 tablet    Take 1 tablet (1,500 mg) by mouth daily    Other osteoporosis without current pathological fracture       clotrimazole 1 % cream    LOTRIMIN     Apply topically 2 times daily        DIPHENDRYL PO      Take 25 mg by mouth every 6 hours as needed for itching        dulaglutide 1.5 MG/0.5ML pen    TRULICITY    2 mL    Inject 1.5 mg Subcutaneous every 7 days    Type 2 diabetes mellitus with mild nonproliferative retinopathy  without macular edema, with long-term current use of insulin, unspecified laterality (H)       gabapentin 300 MG capsule    NEURONTIN    168 capsule    TAKE 2 CAPSULES (600MG) BY MOUTH THREE TIMES A DAY    Type 2 diabetes mellitus with diabetic neuropathy, with long-term current use of insulin (H)       glucose 4 g Chew chewable tablet     20 tablet    Take 2 every 15 minutes for blood sugar <70mg/dL. Recheck blood sugar every 15 minutes until above 70mg/dL, then eat a substantial meal.    Type 2 diabetes mellitus with mild nonproliferative retinopathy without macular edema, with long-term current use of insulin, unspecified laterality (H)       guaiFENesin-codeine 100-10 MG/5ML Soln solution    ROBITUSSIN AC    120 mL    Take 5 mLs by mouth every 4 hours as needed for cough    Cough       ibuprofen 600 MG tablet    ADVIL/MOTRIN    60 tablet    Take 1 tablet (600 mg) by mouth every 4 hours as needed for moderate pain    Closed fracture of one rib of right side, initial encounter       insulin aspart 100 UNIT/ML injection    NovoLOG FLEXPEN    15 mL    Inject 20 Units Subcutaneous 3 times daily (with meals) Once daily, can add additional 5 units if BGs are >500mg/dL.    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, with long-term current use of insulin, unspecified laterality (H)       insulin pen needle 30G X 8 MM    NOVOFINE 30    400 each    USE 4 DAILY OR AS DIRECTED    Type 2 diabetes mellitus with mild nonproliferative retinopathy without macular edema, with long-term current use of insulin, unspecified laterality (H)       ipratropium - albuterol 0.5 mg/2.5 mg/3 mL 0.5-2.5 (3) MG/3ML neb solution    DUONEB    30  vial    Take 1 vial (3 mLs) by nebulization every 6 hours as needed for shortness of breath / dyspnea or wheezing    Pneumonia due to infectious organism, unspecified laterality, unspecified part of lung, Wheezing       losartan 100 MG tablet    COZAAR    28 tablet    TAKE 1 TABLET (100MG) BY MOUTH DAILY    Coronary artery disease involving native heart with angina pectoris, unspecified vessel or lesion type (H)       melatonin 5 MG Caps     180 capsule    Take 2 capsules by mouth daily At dinnertime    Insomnia, unspecified type       metoprolol succinate 25 MG 24 hr tablet    TOPROL-XL    30 tablet    Take 1 tablet (25 mg) by mouth daily    Coronary artery disease involving native heart with angina pectoris, unspecified vessel or lesion type (H)       * order for DME     1 each    Hold Novolog if meals are refused.    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    Generalized muscle weakness       order for DME     1 Units    Equipment being ordered: Nebulizer Machine    Wheezing, Cough, Pneumonia due to infectious organism, unspecified laterality, unspecified part of lung       paliperidone 9 MG 24 hr tablet    INVEGA    30 tablet    Take 1 tablet (9 mg) by mouth every morning    Schizoaffective disorder, bipolar type (H)       polyethylene glycol powder    MIRALAX/GLYCOLAX    527 g    TAKE 8.5 GRAMS (1/2 CAPFUL) BY MOUTH DAILY    Constipation, unspecified constipation type       prochlorperazine 5 MG tablet    COMPAZINE    90 tablet    Take 1 tablet (5 mg) by mouth every 6 hours as needed for nausea or  vomiting    Nausea       ranitidine 300 MG tablet    ZANTAC    90 tablet    TAKE 1 TABLET (300MG) BY MOUTH AT BEDTIME    Gastroesophageal reflux disease without esophagitis       senna-docusate 8.6-50 MG per tablet    SENOKOT-S;PERICOLACE    100 tablet    Take 1 tablet by mouth 2 times daily as needed for constipation    Drug-induced constipation       sertraline 100 MG tablet    ZOLOFT    60 tablet    Take 2 tablets (200 mg) by mouth daily    Schizoaffective disorder, bipolar type (H)       SUMAtriptan 25 MG tablet    IMITREX    12 tablet    Take 1-2 tablets (25-50 mg) by mouth at onset of headache for migraine May repeat in 2 hours. Max 8 tablets/24 hours.    Migraine with aura and without status migrainosus, not intractable       topiramate 25 MG tablet    TOPAMAX    90 tablet    25mg at bedtime for week 1, 50mg at bedtime for 1 week, and 75mg at bedtime thereafter    Morbid obesity (H)       vitamin D3 92408 UNITS capsule    CHOLECALCIFEROL    12 capsule    TAKE 1 CAPSULE (50,000 UNITS) MONTHLY    Vitamin D deficiency       * Notice:  This list has 4 medication(s) that are the same as other medications prescribed for you. Read the directions carefully, and ask your doctor or other care provider to review them with you.

## 2018-06-29 NOTE — MR AVS SNAPSHOT
After Visit Summary   6/29/2018    Nena Tang    MRN: [id number]           Patient Information     Date Of Birth          1960        Visit Information        Provider Department      6/29/2018 11:30 AM Rowena Haas APRN CNP [facility]        Today's Diagnoses     Cough    -  1    Pneumonia due to infectious organism, unspecified laterality, unspecified part of lung        Wheezing          Care Instructions    -Will call for transport and have you wheeled down to the Emergency room for further assessment and evaluation of the chest pain.     -Follow-up in about one week after the ED visit.           Follow-ups after your visit        Your next 10 appointments already scheduled     Jul 16, 2018 10:30 AM CDT   Return Visit with Richardson Lopez [facility] ([facility])    [address]  [address]  [facility] [address]   [phone]            Jul 16, 2018 10:30 AM CDT   Return Visit with ART Arias CNP   [facility] ([facility])    [address]  [address]  [facility] [address]   [phone]            Aug 30, 2018  3:30 PM CDT   RETURN RETINA with Tiburcio Chacon MD   [facility] ([facility])    [address]  [facility] [address] [place] [address]   [phone]            Sep 07, 2018  3:00 PM CDT   Return Visit with Kraig Pedersen MD   [facility] ([facility])    [address]  [facility] [address]   [phone]            Sep 10, 2018 10:45 AM CDT   (Arrive by 10:30 AM)   Return Weight Management Visit with Debbie Germain PA-C   [facility] ([facility])    [address] [facility]  [address] [facility] [address]    [phone]              Who to contact     If you have questions or need follow up information about today's clinic visit or your schedule please contact [facility] directly at [phone].  Normal or non-critical lab and imaging results will be communicated to you by MyChart, letter or phone within 4 business days after the clinic has received the results. If you do not hear from us within 7 days, please contact the clinic through [employer] or phone. If you have a critical or abnormal lab result, we will notify you by phone as soon as possible.  Submit refill requests through [employer] or call your pharmacy and they will forward the refill request to us. Please allow 3 business days for your refill to be completed.          Additional Information About Your Visit        [employer] Information     [employer] gives you secure access to your electronic health record. If you see a primary care provider, you can also send messages to your care team and make appointments. If you have questions, please call your primary care clinic.  If you do not have a primary care provider, please call [phone] and they will assist you.        Care EveryWhere ID     This is your Care EveryWhere ID. This could be used by other organizations to access your [place] medical records  [id number]        Your Vitals Were     Pulse Temperature Respirations Last Period Pulse Oximetry BMI (Body Mass Index)    92 98.5  F (36.9  C) (Oral) 22 01/06/2015 95% 44.37 kg/m2       Blood Pressure from Last 3 Encounters:   06/29/18 99/73   06/29/18 128/58   06/22/18 128/58    Weight from Last 3 Encounters:   06/29/18 232 lb (105.2 kg)   06/22/18 236 lb (107 kg)   06/07/18 237 lb 4.8 oz (107.6 kg)              Today, you had the following     No orders found for display         Today's Medication Changes          These changes are accurate as of 6/29/18  3:19 PM.  If you have any questions, ask your nurse or doctor.                Start taking these medicines.        Dose/Directions    guaiFENesin-codeine 100-10 MG/5ML Soln solution   Commonly known as:  ROBITUSSIN AC   Used for:  Cough   Started by:  Rowena Haas APRN CNP        Dose:  1 tsp.   Take 5 mLs by mouth every 4 hours as needed for cough   Quantity:  120 mL   Refills:  0       order for DME   Used for:  Wheezing, Cough, Pneumonia due to infectious organism, unspecified laterality, unspecified part of lung   Started by:  Rowena Haas APRN CNP        Equipment being ordered: Nebulizer Machine   Quantity:  1 Units   Refills:  0            Where to get your medicines      Some of these will need a paper prescription and others can be bought over the counter.  Ask your nurse if you have questions.     Bring a paper prescription for each of these medications     guaiFENesin-codeine 100-10 MG/5ML Soln solution    order for DME                Primary Care Provider Office Phone # Fax #    ART Arias [phone]       [address] [address] [place] [id number]        Goals        General    Medical (pt-stated)     Notes - Note created  7/7/2016  9:15 AM by Chelsea Pulido, RN    Get blood sugars under control    Improve medication compliance    As of today's date 7/7/2016 goal is met at 0 - 25%.   Goal Status:  Active          Equal Access to Services     [facility]: Hadii samira ku hadasho Soomaali, waaxda luqadaha, qaybta kaalmada adeegyada, lorena toscano hayfernando ellison . So [facility] [phone].    ATENCIÓN: Si habla español, tiene a trinh disposición servicios gratuitos de asistencia lingüística. Llame al [phone].    We comply with applicable federal civil rights laws and [place] laws. We do not discriminate on the basis of race, color, national origin, age, disability, sex, sexual orientation, or gender identity.            Thank you!     Thank you for choosing [facility]  for your care. Our  goal is always to provide you with excellent care. Hearing back from our patients is one way we can continue to improve our services. Please take a few minutes to complete the written survey that you may receive in the mail after your visit with us. Thank you!             Your Updated Medication List - Protect others around you: Learn how to safely use, store and throw away your medicines at www.disposemymeds.org.          This list is accurate as of 6/29/18  3:19 PM.  Always use your most recent med list.                   Brand Name Dispense Instructions for use Diagnosis    * ACETAMINOPHEN PO      Take 1,000 mg by mouth every 6 hours as needed for pain or fever        * acetaminophen 500 MG tablet    TYLENOL    100 tablet    Take 2 tablets (1,000 mg) by mouth 3 times daily as needed for mild pain    Chronic low back pain, unspecified back pain laterality, with sciatica presence unspecified       albuterol 108 (90 Base) MCG/ACT Inhaler    PROAIR HFA/PROVENTIL HFA/VENTOLIN HFA    1 Inhaler    Inhale 2 puffs into the lungs every 6 hours as needed for shortness of breath / dyspnea or wheezing    Pneumonia, organism unspecified(486)       aspirin 81 MG EC tablet     28 tablet    TAKE 1 TABLET (81MG) BY MOUTH DAILY    Coronary artery disease involving native heart with angina pectoris, unspecified vessel or lesion type (H)       benztropine 0.5 MG tablet    COGENTIN    60 tablet    Take 1 tablet (0.5 mg) by mouth 2 times daily    Extrapyramidal and movement disorder       blood glucose monitoring lancets     150 each    Use to test blood sugar 2 times daily or as directed.  Ok to substitute alternative if insurance prefers.    Type 2 diabetes mellitus with diabetic neuropathy, with long-term current use of insulin (H)       blood glucose monitoring test strip    ONETOUCH VERIO IQ    200 strip    Use to test blood sugar 4 times daily .  Ok to substitute alternative if insurance prefers.    Type 2 diabetes mellitus with  diabetic neuropathy, with long-term current use of insulin (H)       calcium carbonate 1500 (600 Ca) MG tablet    OS-ALLEN 600 mg Andreafski. Ca    180 tablet    Take 1 tablet (1,500 mg) by mouth daily    Other osteoporosis without current pathological fracture       clotrimazole 1 % cream    LOTRIMIN     Apply topically 2 times daily        DIPHENDRYL PO      Take 25 mg by mouth every 6 hours as needed for itching        dulaglutide 1.5 MG/0.5ML pen    TRULICITY    2 mL    Inject 1.5 mg Subcutaneous every 7 days    Type 2 diabetes mellitus with mild nonproliferative retinopathy without macular edema, with long-term current use of insulin, unspecified laterality (H)       gabapentin 300 MG capsule    NEURONTIN    168 capsule    TAKE 2 CAPSULES (600MG) BY MOUTH THREE TIMES A DAY    Type 2 diabetes mellitus with diabetic neuropathy, with long-term current use of insulin (H)       glucose 4 g Chew chewable tablet     20 tablet    Take 2 every 15 minutes for blood sugar <70mg/dL. Recheck blood sugar every 15 minutes until above 70mg/dL, then eat a substantial meal.    Type 2 diabetes mellitus with mild nonproliferative retinopathy without macular edema, with long-term current use of insulin, unspecified laterality (H)       guaiFENesin-codeine 100-10 MG/5ML Soln solution    ROBITUSSIN AC    120 mL    Take 5 mLs by mouth every 4 hours as needed for cough    Cough       ibuprofen 600 MG tablet    ADVIL/MOTRIN    60 tablet    Take 1 tablet (600 mg) by mouth every 4 hours as needed for moderate pain    Closed fracture of one rib of right side, initial encounter       insulin aspart 100 UNIT/ML injection    NovoLOG FLEXPEN    15 mL    Inject 20 Units Subcutaneous 3 times daily (with meals) Once daily, can add additional 5 units if BGs are >500mg/dL.    Type 2 diabetes mellitus with mild nonproliferative retinopathy without macular edema, with long-term current use of insulin, unspecified laterality (H)       insulin glargine 100  UNIT/ML injection    LANTUS    3 mL    Inject 48 Units Subcutaneous At Bedtime    Type 2 diabetes mellitus with mild nonproliferative retinopathy without macular edema, with long-term current use of insulin, unspecified laterality (H)       insulin pen needle 30G X 8 MM    NOVOFINE 30    400 each    USE 4 DAILY OR AS DIRECTED    Type 2 diabetes mellitus with mild nonproliferative retinopathy without macular edema, with long-term current use of insulin, unspecified laterality (H)       ipratropium - albuterol 0.5 mg/2.5 mg/3 mL 0.5-2.5 (3) MG/3ML neb solution    DUONEB    30 vial    Take 1 vial (3 mLs) by nebulization every 6 hours as needed for shortness of breath / dyspnea or wheezing    Pneumonia due to infectious organism, unspecified laterality, unspecified part of lung, Wheezing       losartan 100 MG tablet    COZAAR    28 tablet    TAKE 1 TABLET (100MG) BY MOUTH DAILY    Coronary artery disease involving native heart with angina pectoris, unspecified vessel or lesion type (H)       melatonin 5 MG Caps     180 capsule    Take 2 capsules by mouth daily At dinnertime    Insomnia, unspecified type       metoprolol succinate 25 MG 24 hr tablet    TOPROL-XL    30 tablet    Take 1 tablet (25 mg) by mouth daily    Coronary artery disease involving native heart with angina pectoris, unspecified vessel or lesion type (H)       * order for DME     1 each    Hold Novolog if meals are refused.    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    Generalized muscle weakness       order for DME     1 Units    Equipment  being ordered: Nebulizer Machine    Wheezing, Cough, Pneumonia due to infectious organism, unspecified laterality, unspecified part of lung       paliperidone 9 MG 24 hr tablet    INVEGA    30 tablet    Take 1 tablet (9 mg) by mouth every morning    Schizoaffective disorder, bipolar type (H)       polyethylene glycol powder    MIRALAX/GLYCOLAX    527 g    TAKE 8.5 GRAMS (1/2 CAPFUL) BY MOUTH DAILY    Constipation, unspecified constipation type       prochlorperazine 5 MG tablet    COMPAZINE    90 tablet    Take 1 tablet (5 mg) by mouth every 6 hours as needed for nausea or vomiting    Nausea       ranitidine 300 MG tablet    ZANTAC    90 tablet    TAKE 1 TABLET (300MG) BY MOUTH AT BEDTIME    Gastroesophageal reflux disease without esophagitis       senna-docusate 8.6-50 MG per tablet    SENOKOT-S;PERICOLACE    100 tablet    Take 1 tablet by mouth 2 times daily as needed for constipation    Drug-induced constipation       sertraline 100 MG tablet    ZOLOFT    60 tablet    Take 2 tablets (200 mg) by mouth daily    Schizoaffective disorder, bipolar type (H)       SUMAtriptan 25 MG tablet    IMITREX    12 tablet    Take 1-2 tablets (25-50 mg) by mouth at onset of headache for migraine May repeat in 2 hours. Max 8 tablets/24 hours.    Migraine with aura and without status migrainosus, not intractable       topiramate 25 MG tablet    TOPAMAX    90 tablet    25mg at bedtime for week 1, 50mg at bedtime for 1 week, and 75mg at bedtime thereafter    Morbid obesity (H)       vitamin D3 76815 UNITS capsule    CHOLECALCIFEROL    12 capsule    TAKE 1 CAPSULE (50,000 UNITS) MONTHLY    Vitamin D deficiency       * Notice:  This list has 4 medication(s) that are the same as other medications prescribed for you. Read the directions carefully, and ask your doctor or other care provider to review them with you.

## 2018-06-29 NOTE — DISCHARGE INSTRUCTIONS
Please make an appointment to follow up with Your Primary Care Provider in 7-10 days for reevaluation.    Augmentin as directed.    Robitussin AC as directed for cough.    Tylenol for pain.    Return to the emergency department for any problems.

## 2018-06-29 NOTE — ED PROVIDER NOTES
History     Chief Complaint   Patient presents with     Chest Pain     productive cough and SOB; sent from clinic for eval for c/o chest pain/pressure. Finished po abx for pna 2 days ago.     HPI  Nena Tang is a 58 year old female with a history of DMII, hypertension, hyperlipidemia and CAD who presents to the Emergency Department with complaints of chest pain. The patient was sent from clinic. The patient states she was sick with pneumonia the past two weeks, and notes that she finished a five day course of azithromycin Wednesday, 6/27.The patient reports a productive cough with yellow sputum. She reports nasal congestion and mild sinus and face pain. She states her chest is sore from coughing. She reports that she feels a little bit dizzy. She reports shortness of breath as well. The patient has not taking any cold medicine to relieve her symptoms. She denies fever. She denies tobacco use. A chest xray was not performed for the pneumonia.    I have reviewed the Medications, Allergies, Past Medical and Surgical History, and Social History in the [employer] system.  Past Medical History:   Diagnosis Date     CAD (coronary artery disease)     5/2014 cath, nonbostructive stenosis to LAD, RCA.     Chronic low back pain 1/22/2013     Cocaine abuse, in remission      Fecal urgency 3/8/2012     History of heroin abuse      Hyperlipidemia LDL goal <100 10/31/2010     Hypertension 7/29/2013     Illiterate 8/30/2011     Irritable bowel syndrome      Left cataract      Migraine 4/19/2012     Migraine headache 4/22/2013     Moderate major depression (H) 6/8/2011     Noncompliance with medication regimen 6/8/2011     Obesity      CINDY (obstructive sleep apnea) 3/8/2012    uses CPAP     Osteopenia 10/7/2009     Schizoaffective disorder, depressive type (H) 2/25/2013     Suicidal intent 10/2/2013     Takotsubo cardiomyopathy      Type 2 diabetes mellitus (H) 8/30/2011     Uterine cancer (H) 1983     Verbal auditory  hallucination 10/4/2012       Past Surgical History:   Procedure Laterality Date     C OOPHORECTORMY FOR RAHEL, W/BX      UTERINE     CATARACT IOL, RT/LT Bilateral 2017     COLONOSCOPY N/A 3/16/2017    Procedure: COLONOSCOPY;  Surgeon: Traci Gonzalez MD;  Location:  GI     Coronary CTA  2014     HYSTERECTOMY      uterine cancer yearly pap's per provider.     LAPAROSCOPIC CHOLECYSTECTOMY       PHACOEMULSIFICATION CLEAR CORNEA WITH STANDARD INTRAOCULAR LENS IMPLANT Left 2017    Procedure: PHACOEMULSIFICATION CLEAR CORNEA WITH STANDARD INTRAOCULAR LENS IMPLANT;  LEFT EYE PHACOEMULSIFICATION CLEAR CORNEA WITH STANDARD INTRAOCULAR LENS IMPLANT ;  Surgeon: Tyra Diaz MD;  Location:  [facility]     PHACOEMULSIFICATION CLEAR CORNEA WITH STANDARD INTRAOCULAR LENS IMPLANT Right 2017    Procedure: PHACOEMULSIFICATION CLEAR CORNEA WITH STANDARD INTRAOCULAR LENS IMPLANT;  RIGHT EYE PHACOEMULSIFICATION CLEAR CORNEA WITH STANDARD INTRAOCULAR LENS IMPLANT;  Surgeon: Tyra Diaz MD;  Location:  [facility]     RELEASE TRIGGER FINGER  10/11/2012    Left thumb. Procedure: RELEASE TRIGGER FINGER;  LEFT THUMB TRIGGER RELEASE;  Surgeon: Tay Langley MD;  Location:  [facility]     RELEASE TRIGGER FINGER Right 2016    Procedure: RELEASE TRIGGER FINGER;  Surgeon: Albino Castañeda MD;  Location: [facility]       Family History   Problem Relation Age of Onset     Cancer Mother      BLADDER     Respiratory Mother      COPD     GASTROINTESTINAL DISEASE Mother      CIRRHOSIS OF LI BOLIVAR     Alcohol/Drug Mother      Diabetes Mother      Hypertension Mother      Lipids Mother      C.A.D. Mother      Glaucoma Mother      Alcohol/Drug Sister      Mental Illness Sister      Alcohol/Drug Sister      Psychotic Disorder Sister      Cancer Maternal Grandmother      UNKNOWN TYPE     Cancer Brother      COLON     Cancer - colorectal Brother      IN HIS LATE 30S     Alcohol/Drug Brother       OF HEROIN  OVERDOSE AT AGE 22 YRS     Macular Degeneration No family hx of        Social History   Substance Use Topics     Smoking status: Never Smoker     Smokeless tobacco: Never Used     Alcohol use No      Comment: last month       No current facility-administered medications for this encounter.      Current Outpatient Prescriptions   Medication     amoxicillin-clavulanate (AUGMENTIN) 875-125 MG per tablet     guaiFENesin-codeine (ROBITUSSIN AC) 100-10 MG/5ML SOLN solution     acetaminophen (TYLENOL) 500 MG tablet     ACETAMINOPHEN PO     albuterol (PROAIR HFA/PROVENTIL HFA/VENTOLIN HFA) 108 (90 Base) MCG/ACT Inhaler     aspirin 81 MG EC tablet     benztropine (COGENTIN) 0.5 MG tablet     blood glucose monitoring (ONE TOUCH DELICA) lancets     blood glucose monitoring (ONETOUCH VERIO IQ) test strip     calcium carbonate (OS-ALLEN 600 MG [place]. CA) 1500 (600 CA) MG tablet     clotrimazole (LOTRIMIN) 1 % cream     DiphenhydrAMINE HCl (DIPHENDRYL PO)     dulaglutide (TRULICITY) 1.5 MG/0.5ML pen     gabapentin (NEURONTIN) 300 MG capsule     glucose 4 G CHEW chewable tablet     ibuprofen (ADVIL,MOTRIN) 600 MG tablet     insulin aspart (NOVOLOG FLEXPEN) 100 UNIT/ML injection     insulin glargine (LANTUS) 100 UNIT/ML injection     insulin pen needle (NOVOFINE 30) 30G X 8 MM     ipratropium - albuterol 0.5 mg/2.5 mg/3 mL (DUONEB) 0.5-2.5 (3) MG/3ML neb solution     losartan (COZAAR) 100 MG tablet     melatonin 5 MG CAPS     metoprolol succinate (TOPROL-XL) 25 MG 24 hr tablet     order for DME     order for DME     order for DME     order for DME     paliperidone (INVEGA) 9 MG 24 hr tablet     polyethylene glycol (MIRALAX/GLYCOLAX) powder     prochlorperazine (COMPAZINE) 5 MG tablet     ranitidine (ZANTAC) 300 MG tablet     senna-docusate (SENOKOT-S;PERICOLACE) 8.6-50 MG per tablet     sertraline (ZOLOFT) 100 MG tablet     SUMAtriptan (IMITREX) 25 MG tablet     topiramate (TOPAMAX) 25 MG tablet     vitamin D3 (CHOLECALCIFEROL) 22532  UNITS capsule        Allergies   Allergen Reactions     Imidazole Antifungals Hives     Tolerates diflucan     Ketoprofen Itching     Pruritis to topical     Lisinopril Hives     Metformin Other (See Comments)     Patient hospitalized for lactic acidosis - admitting provider suspectd caused by metformin     Metronidazole Hives     Posaconazole Hives     Tolerates diflucan       Review of Systems   Constitutional: Negative for fever.   HENT: Positive for congestion and sinus pressure.    Respiratory: Positive for cough (productive) and shortness of breath.    Cardiovascular: Positive for chest pain.   Neurological: Positive for dizziness.   All other systems reviewed and are negative.      Physical Exam   BP: 102/50  Pulse: 83  Heart Rate: 82  Temp: 98.9  F (37.2  C)  Resp: 20  SpO2: 95 %      Physical Exam   Constitutional: She is oriented to person, place, and time. Vital signs are normal. She appears well-developed and well-nourished.  Non-toxic appearance. She does not appear ill. No distress.   HENT:   Head: Normocephalic and atraumatic.   Mouth/Throat: Oropharynx is clear and moist. No oropharyngeal exudate.   Eyes: Conjunctivae and EOM are normal. Pupils are equal, round, and reactive to light. No scleral icterus.   Neck: Normal range of motion. Neck supple. No JVD present. No tracheal deviation present. No thyromegaly present.   Cardiovascular: Normal rate, regular rhythm, normal heart sounds and intact distal pulses.  Exam reveals no gallop and no friction rub.    No murmur heard.  Pulmonary/Chest: Effort normal and breath sounds normal. No respiratory distress. She exhibits tenderness.       Abdominal: Soft. Bowel sounds are normal. She exhibits no distension and no mass. There is no tenderness.   Musculoskeletal: Normal range of motion. She exhibits no edema or tenderness.   Lymphadenopathy:     She has no cervical adenopathy.   Neurological: She is alert and oriented to person, place, and time. She has  normal strength. No cranial nerve deficit or sensory deficit.   Skin: Skin is warm and dry. No rash noted. No erythema. No pallor.   Psychiatric: She has a normal mood and affect. Her behavior is normal.   Nursing note and vitals reviewed.      ED Course     ED Course     Procedures             EKG Interpretation:      Interpreted by Bossman Robles    Symptoms at time of EKG: Cough, Chest pain   Rhythm: normal sinus   Rate: 82  Ectopy: none  Conduction: normal  ST Segments/ T Waves: No ST-T wave changes  Q Waves: none  Comparison to prior: No old EKG available    Clinical Impression: normal EKG    Results for orders placed or performed during the hospital encounter of 06/29/18   Chest XR,  PA & LAT    Narrative    XR CHEST 2 VW 6/29/2018 2:35 PM    HISTORY: Cough.    COMPARISON: 5/28/2018    FINDINGS: No consolidation, effusion or pneumothorax. Normal heart  size.      Impression    IMPRESSION: No acute cardiopulmonary abnormality.    DELORES CONCEPCION MD   CBC with platelets differential   Result Value Ref Range    WBC 7.4 4.0 - 11.0 10e9/L    RBC Count 3.62 (L) 3.8 - 5.2 10e12/L    Hemoglobin 10.7 (L) 11.7 - 15.7 g/dL    Hematocrit 33.5 (L) 35.0 - 47.0 %    MCV 93 78 - 100 fl    MCH 29.6 26.5 - 33.0 pg    MCHC 31.9 31.5 - 36.5 g/dL    RDW 13.5 10.0 - 15.0 %    Platelet Count 239 150 - 450 10e9/L    Diff Method Automated Method     % Neutrophils 49.1 %    % Lymphocytes 39.2 %    % Monocytes 8.8 %    % Eosinophils 2.0 %    % Basophils 0.4 %    % Immature Granulocytes 0.5 %    Nucleated RBCs 0 0 /100    Absolute Neutrophil 3.6 1.6 - 8.3 10e9/L    Absolute Lymphocytes 2.9 0.8 - 5.3 10e9/L    Absolute Monocytes 0.7 0.0 - 1.3 10e9/L    Absolute Eosinophils 0.2 0.0 - 0.7 10e9/L    Absolute Basophils 0.0 0.0 - 0.2 10e9/L    Abs Immature Granulocytes 0.0 0 - 0.4 10e9/L    Absolute Nucleated RBC 0.0    Basic metabolic panel   Result Value Ref Range    Sodium 139 133 - 144 mmol/L    Potassium 4.7 3.4 - 5.3 mmol/L     Chloride 107 94 - 109 mmol/L    Carbon Dioxide 22 20 - 32 mmol/L    Anion Gap 10 3 - 14 mmol/L    Glucose 104 (H) 70 - 99 mg/dL    Urea Nitrogen 28 7 - 30 mg/dL    Creatinine 1.06 (H) 0.52 - 1.04 mg/dL    GFR Estimate 53 (L) >60 mL/min/1.7m2    GFR Estimate If Black 65 >60 mL/min/1.7m2    Calcium 9.1 8.5 - 10.1 mg/dL   Troponin I   Result Value Ref Range    Troponin I ES <0.015 0.000 - 0.045 ug/L   EKG 12 lead   Result Value Ref Range    Interpretation ECG Click View Image link to view waveform and result      *Note: Due to a large number of results and/or encounters for the requested time period, some results have not been displayed. A complete set of results can be found in Results Review.            Assessments & Plan (with Medical Decision Making)   This patient presented in the Emergency Department complaining of right sided chest pain. She had been treated with a course of citramax and has had two weeks of cough that has not improved. She was sent here for concerns of pnemonia. She is afebrile, does not have leukocytosis and has a clear lung exam. Her chest xray demonstrated no evidence to suggest pnemonia. Chest pain is reproducible on palpation and a 12 lead ekg shows no acute ischemic changes and she has a negative troponin despite having pain for several days, decreasing any suspicion for cardiac source of pain. Clinical suspicion is that the pain is secondary to all of the coughing she has been doing, and I suspect the coughing may be coming from more of a sinus infection, she does reports she is blowing yellow to greenish discharge from her nose and has been having post nasal drip. She will be placed on Augmentin and was also given a prescription of robitussin AC. At this point I am comfortable discharging her and have her return if anything worsens.     This part of the medical record was transcribed by Li Barrientos, Medical Scribe, from a dictation done by Dr. Robles.     I have reviewed the nursing  notes.    I have reviewed the findings, diagnosis, plan and need for follow up with the patient.    Discharge Medication List as of 6/29/2018  4:13 PM      START taking these medications    Details   amoxicillin-clavulanate (AUGMENTIN) 875-125 MG per tablet Take 1 tablet by mouth 2 times daily for 10 days, Disp-20 tablet, R-0, Local Print             Final diagnoses:   Cough     I, Li Barrientos, am serving as a trained medical scribe to document services personally performed by Bossman Robles MD, based on the provider's statements to me.   IBossman MD, was physically present and have reviewed and verified the accuracy of this note documented by Li Barrientos.    6/29/2018   [facility], [place], EMERGENCY DEPARTMENT     Bossman Robles MD  07/03/18 [id number]

## 2018-06-29 NOTE — PROGRESS NOTES
SUBJECTIVE:                                                    Nena Tang is a 58 year old  female who presents to clinic today for the following health issues:  Patient is accompanied by her caregiver from her foster home.     Pneumonia Follow-up  Patient was seen 6/22/18 for body aches, cough and cold symptoms. Patient is here for a follow-up appointment. Reports that her cough is not getting better, still having body aches, cough and feeling out of sorts and tired. Finished antibiotic a couple of days ago. Struggling with using her inhaler-unable to use it appropriately even when directed during the office visit.   During the visit patient started complaining about chest pain and pressure. Denies any other symptoms at the time.     Duration: about 1 week ago    Description  nasal congestion, rhinorrhea, sore throat, facial pain/pressure, cough, wheezing, chills, ear pain both, headache, fatigue/malaise and hoarse voice    Severity: moderate    Accompanying signs and symptoms: dizziness    History (predisposing factors):  none    Precipitating or alleviating factors: None    Therapies tried and outcome:  Antibiotics; inhaler, unable to get nebulizer to be covered by her insurance.         Problems taking medications regularly: No    Medication side effects: none    Diet: low fat/cholesterol    Social History   Substance Use Topics     Smoking status: Never Smoker     Smokeless tobacco: Never Used     Alcohol use No      Comment: last month        Problem list and histories reviewed & adjusted, as indicated.  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 (H)     Illiterate     Irritable bowel syndrome     overweight - BMI >35     Takotsubo cardiomyopathy     CAD (coronary artery  disease)     Restless legs syndrome (RLS)     CINDY (obstructive sleep apnea)- mild AHI 10.3     Verbal auditory hallucination     Chronic low back pain     Schizoaffective disorder, depressive type (H)     Migraine headache     HTN, goal below 140/90     Health Care Home     Lumbago     Cervicalgia     Cocaine abuse, episodic     Suicidal ideation     Esophageal reflux     Mild nonproliferative diabetic retinopathy (H)     Tardive dyskinesia     Alcohol use     Left cataract     Falls frequently     History of uterine cancer     Psychophysiological insomnia     Dysuria     Asymptomatic postmenopausal status     Abdominal pain, right lower quadrant     Sepsis (H)     Pneumonia of right lower lobe due to infectious organism (H)     Infectious encephalopathy     Non-intractable vomiting with nausea     Acute respiratory failure with hypoxia (H)     Thoughts of self harm     Gastroenteritis     Posttraumatic stress disorder     Cervical cancer screening     Type 2 diabetes mellitus with stage 3 chronic kidney disease, with long-term current use of insulin (H)     Past Surgical History:   Procedure Laterality Date     C OOPHORECTORMY FOR RAHEL, W/BX  1983    UTERINE     CATARACT IOL, RT/LT Bilateral 2017     COLONOSCOPY N/A 3/16/2017    Procedure: COLONOSCOPY;  Surgeon: Traci Gonzalez MD;  Location:  GI     Coronary CTA  5/21/2014     HYSTERECTOMY  1983    uterine cancer yearly pap's per provider.     LAPAROSCOPIC CHOLECYSTECTOMY  2008     PHACOEMULSIFICATION CLEAR CORNEA WITH STANDARD INTRAOCULAR LENS IMPLANT Left 5/5/2017    Procedure: PHACOEMULSIFICATION CLEAR CORNEA WITH STANDARD INTRAOCULAR LENS IMPLANT;  LEFT EYE PHACOEMULSIFICATION CLEAR CORNEA WITH STANDARD INTRAOCULAR LENS IMPLANT ;  Surgeon: Tyra Diaz MD;  Location: [facility]     PHACOEMULSIFICATION CLEAR CORNEA WITH STANDARD INTRAOCULAR LENS IMPLANT Right 6/30/2017    Procedure: PHACOEMULSIFICATION CLEAR CORNEA WITH STANDARD INTRAOCULAR LENS  IMPLANT;  RIGHT EYE PHACOEMULSIFICATION CLEAR CORNEA WITH STANDARD INTRAOCULAR LENS IMPLANT;  Surgeon: Tyra Diaz MD;  Location:  [facility]     RELEASE TRIGGER FINGER  10/11/2012    Left thumb. Procedure: RELEASE TRIGGER FINGER;  LEFT THUMB TRIGGER RELEASE;  Surgeon: Tay Langley MD;  Location:  [facility]     RELEASE TRIGGER FINGER Right 2016    Procedure: RELEASE TRIGGER FINGER;  Surgeon: Albino Castañeda MD;  Location: [facility]       Social History   Substance Use Topics     Smoking status: Never Smoker     Smokeless tobacco: Never Used     Alcohol use No      Comment: last month     Family History   Problem Relation Age of Onset     Cancer Mother      BLADDER     Respiratory Mother      COPD     GASTROINTESTINAL DISEASE Mother      CIRRHOSIS OF LI BOLIVAR     Alcohol/Drug Mother      Diabetes Mother      Hypertension Mother      Lipids Mother      C.A.D. Mother      Glaucoma Mother      Alcohol/Drug Sister      Mental Illness Sister      Alcohol/Drug Sister      Psychotic Disorder Sister      Cancer Maternal Grandmother      UNKNOWN TYPE     Cancer Brother      COLON     Cancer - colorectal Brother      IN HIS LATE 30S     Alcohol/Drug Brother       OF HEROIN OVERDOSE AT AGE 22 YRS     Macular Degeneration No family hx of            Current Outpatient Prescriptions   Medication Sig Dispense Refill     acetaminophen (TYLENOL) 500 MG tablet Take 2 tablets (1,000 mg) by mouth 3 times daily as needed for mild pain 100 tablet 0     ACETAMINOPHEN PO Take 1,000 mg by mouth every 6 hours as needed for pain or fever       albuterol (PROAIR HFA/PROVENTIL HFA/VENTOLIN HFA) 108 (90 Base) MCG/ACT Inhaler Inhale 2 puffs into the lungs every 6 hours as needed for shortness of breath / dyspnea or wheezing 1 Inhaler 0     albuterol (PROAIR HFA/PROVENTIL HFA/VENTOLIN HFA) 108 (90 BASE) MCG/ACT Inhaler Inhale 2 puffs into the lungs every 6 hours as needed for shortness of breath / dyspnea or wheezing 3 Inhaler 1      aspirin 81 MG EC tablet TAKE 1 TABLET (81MG) BY MOUTH DAILY 28 tablet 3     azithromycin (ZITHROMAX) 250 MG tablet Two tablets first day, then one tablet daily for four days. 6 tablet 0     benztropine (COGENTIN) 0.5 MG tablet Take 1 tablet (0.5 mg) by mouth 2 times daily 60 tablet 1     blood glucose monitoring (ONE TOUCH DELICA) lancets Use to test blood sugar 2 times daily or as directed.  Ok to substitute alternative if insurance prefers. 150 each 11     blood glucose monitoring (ONETOUCH VERIO IQ) test strip Use to test blood sugar 4 times daily .  Ok to substitute alternative if insurance prefers. 200 strip 11     calcium carbonate (OS-ALLEN 600 MG [place]. CA) 1500 (600 CA) MG tablet Take 1 tablet (1,500 mg) by mouth daily 180 tablet 3     clotrimazole (LOTRIMIN) 1 % cream Apply topically 2 times daily       DiphenhydrAMINE HCl (DIPHENDRYL PO) Take 25 mg by mouth every 6 hours as needed for itching       dulaglutide (TRULICITY) 1.5 MG/0.5ML pen Inject 1.5 mg Subcutaneous every 7 days 2 mL 3     gabapentin (NEURONTIN) 300 MG capsule TAKE 2 CAPSULES (600MG) BY MOUTH THREE TIMES A  capsule 0     glucose 4 G CHEW chewable tablet Take 2 every 15 minutes for blood sugar <70mg/dL. Recheck blood sugar every 15 minutes until above 70mg/dL, then eat a substantial meal. 20 tablet 1     ibuprofen (ADVIL,MOTRIN) 600 MG tablet Take 1 tablet (600 mg) by mouth every 4 hours as needed for moderate pain 60 tablet 0     insulin aspart (NOVOLOG FLEXPEN) 100 UNIT/ML injection Inject 20 Units Subcutaneous 3 times daily (with meals) Once daily, can add additional 5 units if BGs are >500mg/dL. 15 mL 11     insulin glargine (LANTUS) 100 UNIT/ML injection Inject 48 Units Subcutaneous At Bedtime 3 mL 11     insulin pen needle (NOVOFINE 30) 30G X 8 MM USE 4 DAILY OR AS DIRECTED 400 each 0     ipratropium - albuterol 0.5 mg/2.5 mg/3 mL (DUONEB) 0.5-2.5 (3) MG/3ML neb solution Take 1 vial (3 mLs) by nebulization every 6 hours as needed  for shortness of breath / dyspnea or wheezing 30 vial 0     ipratropium - albuterol 0.5 mg/2.5 mg/3 mL (DUONEB) 0.5-2.5 (3) MG/3ML neb solution Take 1 vial (3 mLs) by nebulization every 6 hours as needed for shortness of breath / dyspnea or wheezing 30 vial 0     losartan (COZAAR) 100 MG tablet TAKE 1 TABLET (100MG) BY MOUTH DAILY 28 tablet 3     melatonin 5 MG CAPS Take 2 capsules by mouth daily At dinnertime 180 capsule 3     metoprolol succinate (TOPROL-XL) 25 MG 24 hr tablet Take 1 tablet (25 mg) by mouth daily 30 tablet 1     order for DME Equipment being ordered: Nebulizer 1 Units 0     order for DME Equipment being ordered: 4 Wheeled walker with seat and brakes. 1 each 0     order for DME Diabetic Shoes 2 each 0     order for DME Hold Novolog if meals are refused. 1 each 0     paliperidone (INVEGA) 9 MG 24 hr tablet Take 1 tablet (9 mg) by mouth every morning 30 tablet 3     polyethylene glycol (MIRALAX/GLYCOLAX) powder TAKE 8.5 GRAMS (1/2 CAPFUL) BY MOUTH DAILY 527 g 3     prochlorperazine (COMPAZINE) 5 MG tablet Take 1 tablet (5 mg) by mouth every 6 hours as needed for nausea or vomiting 90 tablet 0     ranitidine (ZANTAC) 300 MG tablet TAKE 1 TABLET (300MG) BY MOUTH AT BEDTIME 90 tablet 1     senna-docusate (SENOKOT-S;PERICOLACE) 8.6-50 MG per tablet Take 1 tablet by mouth 2 times daily as needed for constipation 100 tablet 1     sertraline (ZOLOFT) 100 MG tablet Take 2 tablets (200 mg) by mouth daily 60 tablet 1     SUMAtriptan (IMITREX) 25 MG tablet Take 1-2 tablets (25-50 mg) by mouth at onset of headache for migraine May repeat in 2 hours. Max 8 tablets/24 hours. 12 tablet 1     topiramate (TOPAMAX) 25 MG tablet 25mg at bedtime for week 1, 50mg at bedtime for 1 week, and 75mg at bedtime thereafter 90 tablet 3     vitamin D3 (CHOLECALCIFEROL) 41649 UNITS capsule TAKE 1 CAPSULE (50,000 UNITS) MONTHLY 12 capsule 1     Allergies   Allergen Reactions     Imidazole Antifungals Hives     Tolerates diflucan      Ketoprofen Itching     Pruritis to topical     Lisinopril Hives     Metformin Other (See Comments)     Patient hospitalized for lactic acidosis - admitting provider suspectd caused by metformin     Metronidazole Hives     Posaconazole Hives     Tolerates diflucan     Recent Labs   Lab Test  05/28/18   1625  05/22/18   1434  02/12/18   1408  02/08/18   2155   01/13/18   2315  12/09/17   0748   11/07/17   0801   06/27/17   1358   12/29/16   0909   07/13/16   1350   07/14/15   1215   09/03/13   1156   A1C   --   6.2*  6.8*   --    --    --   8.9*   --   8.9*   < >  7.0*   < >   --    < >   --    < >  9.1*   < >  10.8*   LDL   --    --    --    --    --    --    --    --    --    --   64   --    --    --   137*   --   78   < >  90   HDL   --    --    --    --    --    --    --    --    --    --   37*   --    --    --    --    --   39*   --   37*   TRIG   --    --    --    --    --    --    --    --    --    --   267*   --    --    --    --    --   151*   --   171*   ALT  27   --    --   23   --   29   --    --   23   < >  32   < >  26   < >   --    < >   --    < >  38   CR  1.16*   --    --   1.09*   < >  1.62*   --    < >  0.88   < >  0.78   < >  0.72   < >   --    < >  0.67   < >  0.54   GFRESTIMATED  48*   --    --   52*   < >  33*   --    < >  66   < >  76   < >  83   < >   --    < >  >90  Non  GFR Calc     < >  >90   GFRESTBLACK  58*   --    --   63   < >  40*   --    < >  80   < >  >90   GFR Calc     < >  >90   GFR Calc     < >   --    < >  >90   GFR Calc     < >  >90   POTASSIUM  4.5   --    --   4.3   < >  4.0   --    < >  3.9   < >  4.3   < >  4.2   < >   --    < >  4.3   < >  4.4   TSH   --    --    --    --    --    --    --    --   3.21   --    --    --   2.24   < >   --    < >   --    < >  1.42    < > = values in this interval not displayed.      BP Readings from Last 3 Encounters:   06/22/18 128/58   06/07/18 157/89   06/04/18 94/58    Wt  Readings from Last 3 Encounters:   06/22/18 236 lb (107 kg)   06/07/18 237 lb 4.8 oz (107.6 kg)   06/04/18 235 lb (106.6 kg)        Labs reviewed in EPIC  Problem list, Medication list, Allergies, and Medical/Social/Surgical histories reviewed in [facility] and updated as appropriate.     ROS: Constitutional, neuro, ENT, endocrine, pulmonary, cardiac, gastrointestinal, genitourinary, musculoskeletal, integument and psychiatric systems are negative, except as otherwise noted above in the HPI.   OBJECTIVE:                                                    /58  Pulse 92  Temp 98.5  F (36.9  C) (Oral)  Resp 22  Wt 232 lb (105.2 kg)  LMP 01/06/2015  SpO2 95%  BMI 44.37 kg/m2  Body mass index is 44.37 kg/(m^2).  GENERAL: lethargic  EYES: Eyes grossly normal to inspection, extraocular movements - intact  HENT: sinus pressure and congestion.   NECK: no tenderness, no adenopathy, no asymmetry, no masses, no stiffness; thyroid- normal to palpation  RESP: productive cough, wheezing throughout.   CV: Chest pain and pressure.   MS: extremities- no gross deformities noted, no edema  NEURO:Dizziness, A & O x 4.   LYMPHATICS: ant. cervical- normal, post. cervical- normal, supraclavicular- normal  Mental Status Assessment:  Appearance:   Appropriate   Eye Contact:   Good   Psychomotor Behavior: Normal   Attitude:   Cooperative   Orientation:   All  Speech   Rate / Production: Normal    Volume:  Normal   Mood:    Normal  Affect:    Appropriate   Thought Content:  Clear   Thought Form:  Coherent  Logical   Insight:    Good     Diagnostic Test Results:  Pending.      ASSESSMENT/PLAN:                                                    (R05) Cough  (primary encounter diagnosis)  Comment: Patient complaining of chest pain while in clinic. Denies any improvement with antibiotic course. Patient is dizzy and still having some symptoms that she presented with about a week ago.   Pneumonia vs other respiratory illness with no change  after oral antibiotics.   Plan: guaiFENesin-codeine (ROBITUSSIN AC) 100-10         MG/5ML SOLN solution, order for DME,    XR Chest 2 Views, CBC with platelets FUTURE;Comprehensive metabolic panel         (BMP + Alb, Alk Phos, ALT, AST, Total. Bili,         TP)  -Will have patient transfer to the emergency room for further evaluation and closer monitoring.     (J18.9) Pneumonia due to infectious organism, unspecified laterality, unspecified part of lung  Comment: noted above.   Plan: order for DME,         -Re-ordering nebulizer machine for treatments.       Patient Instructions   -Will call for transport and have you wheeled down to the Emergency room for further assessment and evaluation of the chest pain.     -Follow-up in about one week after the ED visit.       ART Fay [facility] PRIMARY [facility]

## 2018-06-29 NOTE — ED AVS SNAPSHOT
[facility], Emergency Department    [address]    [facility] [address]    Phone:  [phone]    Fax:  [phone]                                       Nena Tang   MRN: [id number]    Department:  [facility], Emergency Department   Date of Visit:  6/29/2018           After Visit Summary Signature Page     I have received my discharge instructions, and my questions have been answered. I have discussed any challenges I see with this plan with the nurse or doctor.    ..........................................................................................................................................  Patient/Patient Representative Signature      ..........................................................................................................................................  Patient Representative Print Name and Relationship to Patient    ..................................................               ................................................  Date                                            Time    ..........................................................................................................................................  Reviewed by Signature/Title    ...................................................              ..............................................  Date                                                            Time

## 2018-07-02 LAB — INTERPRETATION ECG - MUSE: NORMAL

## 2018-07-03 DIAGNOSIS — I25.119 CORONARY ARTERY DISEASE INVOLVING NATIVE HEART WITH ANGINA PECTORIS, UNSPECIFIED VESSEL OR LESION TYPE (H): ICD-10-CM

## 2018-07-03 DIAGNOSIS — E11.40 TYPE 2 DIABETES MELLITUS WITH DIABETIC NEUROPATHY, WITH LONG-TERM CURRENT USE OF INSULIN (H): ICD-10-CM

## 2018-07-03 DIAGNOSIS — Z79.4 TYPE 2 DIABETES MELLITUS WITH DIABETIC NEUROPATHY, WITH LONG-TERM CURRENT USE OF INSULIN (H): ICD-10-CM

## 2018-07-03 RX ORDER — GABAPENTIN 300 MG/1
CAPSULE ORAL
Qty: 168 CAPSULE | Refills: 0 | Status: SHIPPED | OUTPATIENT
Start: 2018-07-03 | End: 2018-07-30

## 2018-07-03 NOTE — TELEPHONE ENCOUNTER
"insulin pen needle (NOVOFINE 30) 30G X 8 MM  Last Written Prescription Date:  3/14/18  Last Fill Quantity: 400,  # refills: 0   Last office visit: 6/29/2018 with prescribing provider:     Future Office Visit:   Next 5 appointments (look out 90 days)     Jul 16, 2018 10:30 AM CDT   Return Visit with BIN Mondragon   [facility] ([facility])    [address]  [address]  [facility] [address]   [phone]            Jul 16, 2018 10:30 AM CDT   Return Visit with ART Arias CNP   [facility] ([facility])    [address]  [address]  [facility] [address]   [phone]            Sep 07, 2018  3:00 PM CDT   Return Visit with Kraig Pedersen MD   [facility] ([facility])    [address]  [facility] [address]   [phone]                 Requested Prescriptions   Pending Prescriptions Disp Refills     gabapentin (NEURONTIN) 300 MG capsule 168 capsule 0     Sig: TAKE 2 CAPSULES (600MG) BY MOUTH THREE TIMES A DAY    There is no refill protocol information for this order        aspirin 81 MG EC tablet 28 tablet 3    Analgesics (Non-Narcotic Tylenol and ASA Only) Passed    7/3/2018  3:57 PM       Passed - Recent (12 mo) or future (30 days) visit within the authorizing provider's specialty    Patient had office visit in the last 12 months or has a visit in the next 30 days with authorizing provider or within the authorizing provider's specialty.  See \"Patient Info\" tab in inbasket, or \"Choose Columns\" in Meds & Orders section of the refill encounter.           Passed - Patient is age 20 years or older    If ASA is flagged for ages under 20 years old. Forward to provider for confirmation Ryes Syndrome is not a concern.              Writer received another refill request for same pharmacy. Linking refill " request.  Sherly Villatoro

## 2018-07-03 NOTE — TELEPHONE ENCOUNTER
gabapentin (NEURONTIN) 300 MG capsule  Last Written Prescription Date:  6/9/18  Last Fill Quantity: 168,  # refills: 0   Last office visit: 6/29/2018 with prescribing provider:     Future Office Visit:   Next 5 appointments (look out 90 days)     Jul 16, 2018 10:30 AM CDT   Return Visit with BIN Mondragon   [facility] ([facility]    [address]  [address]  [facility] [address]   [phone]            Jul 16, 2018 10:30 AM CDT   Return Visit with ART Arias CNP   [facility] ([facility])    [address]  [address]  [facility] [address]   [phone]            Sep 07, 2018  3:00 PM CDT   Return Visit with Kraig Pedersen MD   [facility] ([facility])    [address]  [facility] [address]   [phone]                 aspirin 81 MG EC tablet  Last Written Prescription Date:  3/16/18  Last Fill Quantity: 28 tablets,  # refills: 3   Last office visit: 6/29/2018 with prescribing provider:     Future Office Visit:   Next 5 appointments (look out 90 days)     Jul 16, 2018 10:30 AM CDT   Return Visit with BIN Mondragon   [facility]    [address]  [address]  [facility] [address]   [phone]            Jul 16, 2018 10:30 AM CDT   Return Visit with ART Arias CNP   [facility]    [address]  [address]  [facility] [address]   [phone]            Sep 07, 2018  3:00 PM CDT   Return Visit with Kraig Pedersen MD   [facility] ([facility])    [address]  [facility] [address]   [phone]                 Requested Prescriptions   Pending  "Prescriptions Disp Refills     gabapentin (NEURONTIN) 300 MG capsule 168 capsule 0     Sig: TAKE 2 CAPSULES (600MG) BY MOUTH THREE TIMES A DAY    There is no refill protocol information for this order        aspirin 81 MG EC tablet 28 tablet 3    Analgesics (Non-Narcotic Tylenol and ASA Only) Passed    7/3/2018  3:55 PM       Passed - Recent (12 mo) or future (30 days) visit within the authorizing provider's specialty    Patient had office visit in the last 12 months or has a visit in the next 30 days with authorizing provider or within the authorizing provider's specialty.  See \"Patient Info\" tab in inbasket, or \"Choose Columns\" in Meds & Orders section of the refill encounter.           Passed - Patient is age 20 years or older    If ASA is flagged for ages under 20 years old. Forward to provider for confirmation Ryes Syndrome is not a concern.                "

## 2018-07-03 NOTE — TELEPHONE ENCOUNTER
gabapentin (NEURONTIN) 300 MG capsule  Controlled Substance Refill Request    Last refill: 6/7/18, , 167 for 28 days    Last clinic visit: 6/29/18    Next appt: 7/16/18    Controlled substance agreement on file: No.    Documentation in problem list reviewed:  Yes    Processing:  escribe    [place] reviewed:  reviewed- no concerns  [place]:  [email or web address]      Indra Le RN

## 2018-07-12 DIAGNOSIS — M54.5 CHRONIC LOW BACK PAIN, UNSPECIFIED BACK PAIN LATERALITY, WITH SCIATICA PRESENCE UNSPECIFIED: ICD-10-CM

## 2018-07-12 DIAGNOSIS — G89.29 CHRONIC LOW BACK PAIN, UNSPECIFIED BACK PAIN LATERALITY, WITH SCIATICA PRESENCE UNSPECIFIED: ICD-10-CM

## 2018-07-12 RX ORDER — ACETAMINOPHEN 500 MG
1000 TABLET ORAL 3 TIMES DAILY PRN
Qty: 100 TABLET | Refills: 3 | Status: SHIPPED | OUTPATIENT
Start: 2018-07-12 | End: 2019-07-22

## 2018-07-12 NOTE — TELEPHONE ENCOUNTER
"acetaminophen (TYLENOL) 500 MG tablet  Last Written Prescription Date:  1/26/18  Last Fill Quantity: 100,  # refills: 0   Last office visit: 6/29/2018 with prescribing provider:     Future Office Visit:   Next 5 appointments (look out 90 days)     Jul 16, 2018 10:30 AM CDT   Return Visit with BIN Mondragon   [facility] ([facility])    [address]  [address]  [facility] [address]   [phone]            Jul 16, 2018 10:30 AM CDT   Return Visit with ART Arias CNP   [facility] [place]    [address]  [address]  [facility] [address]   [phone]            Sep 07, 2018  3:00 PM CDT   Return Visit with Kraig Pedersen MD   [facility] ([facility]    [address]  [facility] [address]   [phone]                 Requested Prescriptions   Pending Prescriptions Disp Refills     acetaminophen (TYLENOL) 500 MG tablet 100 tablet 0     Sig: Take 2 tablets (1,000 mg) by mouth 3 times daily as needed for mild pain    Analgesics (Non-Narcotic Tylenol and ASA Only) Passed    7/12/2018  9:24 AM       Passed - Recent (12 mo) or future (30 days) visit within the authorizing provider's specialty    Patient had office visit in the last 12 months or has a visit in the next 30 days with authorizing provider or within the authorizing provider's specialty.  See \"Patient Info\" tab in inbasket, or \"Choose Columns\" in Meds & Orders section of the refill encounter.           Passed - Patient is 7 months old or older    If patient is a peds patient of the age 7 mos -12 years, ok to refill using weight-based dosing.     If >3g daily and/or sig is not \"prn\", check for liver enzymes. If normal in the last year, ok to refill.  If not, refer to the provider.            "

## 2018-07-14 ENCOUNTER — MEDICAL CORRESPONDENCE (OUTPATIENT)
Dept: HEALTH INFORMATION MANAGEMENT | Facility: CLINIC | Age: 58
End: 2018-07-14

## 2018-07-16 ENCOUNTER — OFFICE VISIT (OUTPATIENT)
Dept: BEHAVIORAL HEALTH | Facility: CLINIC | Age: 58
End: 2018-07-16
Payer: MEDICARE

## 2018-07-16 ENCOUNTER — OFFICE VISIT (OUTPATIENT)
Dept: FAMILY MEDICINE | Facility: CLINIC | Age: 58
End: 2018-07-16
Payer: MEDICARE

## 2018-07-16 VITALS
WEIGHT: 235 LBS | SYSTOLIC BLOOD PRESSURE: 92 MMHG | TEMPERATURE: 98.2 F | HEART RATE: 89 BPM | DIASTOLIC BLOOD PRESSURE: 58 MMHG | OXYGEN SATURATION: 97 % | BODY MASS INDEX: 44.95 KG/M2

## 2018-07-16 DIAGNOSIS — F25.1 SCHIZOAFFECTIVE DISORDER, DEPRESSIVE TYPE (H): Primary | ICD-10-CM

## 2018-07-16 DIAGNOSIS — F43.10 POSTTRAUMATIC STRESS DISORDER: ICD-10-CM

## 2018-07-16 DIAGNOSIS — E66.01 MORBID OBESITY (H): ICD-10-CM

## 2018-07-16 DIAGNOSIS — N18.30 TYPE 2 DIABETES MELLITUS WITH STAGE 3 CHRONIC KIDNEY DISEASE, WITH LONG-TERM CURRENT USE OF INSULIN (H): Primary | ICD-10-CM

## 2018-07-16 DIAGNOSIS — Z11.4 SCREENING FOR HIV (HUMAN IMMUNODEFICIENCY VIRUS): ICD-10-CM

## 2018-07-16 DIAGNOSIS — Z79.4 TYPE 2 DIABETES MELLITUS WITH STAGE 3 CHRONIC KIDNEY DISEASE, WITH LONG-TERM CURRENT USE OF INSULIN (H): Primary | ICD-10-CM

## 2018-07-16 DIAGNOSIS — E11.22 TYPE 2 DIABETES MELLITUS WITH STAGE 3 CHRONIC KIDNEY DISEASE, WITH LONG-TERM CURRENT USE OF INSULIN (H): Primary | ICD-10-CM

## 2018-07-16 PROCEDURE — 99207 ZZC NO CHARGE LOS: CPT | Performed by: SOCIAL WORKER

## 2018-07-16 PROCEDURE — 99214 OFFICE O/P EST MOD 30 MIN: CPT | Performed by: NURSE PRACTITIONER

## 2018-07-16 NOTE — MR AVS SNAPSHOT
After Visit Summary   7/16/2018    Nena Tang    MRN: [id number]           Patient Information     Date Of Birth          1960        Visit Information        Provider Department      7/16/2018 10:30 AM Richardson Lopez, [facility]        Today's Diagnoses     Schizoaffective disorder, depressive type (H)    -  1    Posttraumatic stress disorder           Follow-ups after your visit        Your next 10 appointments already scheduled     Jul 23, 2018 10:00 AM CDT   Return Visit with BIN Mondragon   [facility] ([facility])    [address]  [address]  [facility] [address]   [phone]            Aug 06, 2018 11:00 AM CDT   Return Visit with ART Arias CNP   [facility] ([facility])    [address]  [address]  [facility] [address]   [phone]            Aug 06, 2018 11:00 AM CDT   Return Visit with Richardson Lopez [facility] ([facility])    [address]  [address]  [facility] [address]   [phone]            Aug 30, 2018  3:30 PM CDT   RETURN RETINA with iTburcio Chacon MD   [facility] ([facility])    [address]  [address] [address]  [facility] [address]   [phone]            Sep 07, 2018  3:00 PM CDT   Return Visit with Kraig Pedersen MD   [facility] ([facility])    [address]  [facility] [address]   [phone]            Sep 10, 2018 10:45 AM CDT   (Arrive by 10:30 AM)   Return Weight Management Visit with Debbie Germain PA-C   [facility] ([facility])    [address] [facility] [address]  [address] [facility] [address]    [phone]              Future tests that were ordered for you today     Open Future Orders        Priority Expected Expires Ordered    HIV Screening Routine  7/16/2019 7/16/2018    Lipid panel reflex to direct LDL Fasting Routine  7/16/2019 7/16/2018    Albumin Random Urine Quantitative with Creat Ratio Routine  7/16/2019 7/16/2018            Who to contact     If you have questions or need follow up information about today's clinic visit or your schedule please contact [facility] directly at [phone].  Normal or non-critical lab and imaging results will be communicated to you by National Institutes of Health (NIH)hart, letter or phone within 4 business days after the clinic has received the results. If you do not hear from us within 7 days, please contact the clinic through [employer] or phone. If you have a critical or abnormal lab result, we will notify you by phone as soon as possible.  Submit refill requests through [employer] or call your pharmacy and they will forward the refill request to us. Please allow 3 business days for your refill to be completed.          Additional Information About Your Visit        National Institutes of Health (NIH)hart Information     [employer] gives you secure access to your electronic health record. If you see a primary care provider, you can also send messages to your care team and make appointments. If you have questions, please call your primary care clinic.  If you do not have a primary care provider, please call [phone] and they will assist you.        Care EveryWhere ID     This is your Care EveryWhere ID. This could be used by other organizations to access your [place] medical records  [id number]        Your Vitals Were     Last Period                   01/06/2015            Blood Pressure from Last 3 Encounters:   07/16/18 92/58   06/29/18 106/66   06/29/18 128/58    Weight from Last 3 Encounters:   07/16/18 235 lb (106.6 kg)   06/29/18 232 lb (105.2 kg)   06/22/18 236 lb (107 kg)               Today, you had the following     No orders found for display       Primary Care Provider Office Phone # Fax #    ART Arias [phone]       [address] [facility] [address] [place] [address]        Goals        General    Medical (pt-stated)     Notes - Note created  7/7/2016  9:15 AM by Chelsea Pulido RN    Get blood sugars under control    Improve medication compliance    As of today's date 7/7/2016 goal is met at 0 - 25%.   Goal Status:  Active          Equal Access to Services     [facility]: Hadii aad ku hadasho Soomaali, waaxda luqadaha, qaybta kaalmada adeegyada, waxay idiin hayaan adeeg kharasae ellison . So [facility] [phone].    ATENCIÓN: Si habla español, tiene a trinh disposición servicios gratuitos de asistencia lingüística. Llame al [phone].    We comply with applicable federal civil rights laws and [place] laws. We do not discriminate on the basis of race, color, national origin, age, disability, sex, sexual orientation, or gender identity.            Thank you!     Thank you for choosing [facility]  for your care. Our goal is always to provide you with excellent care. Hearing back from our patients is one way we can continue to improve our services. Please take a few minutes to complete the written survey that you may receive in the mail after your visit with us. Thank you!             Your Updated Medication List - Protect others around you: Learn how to safely use, store and throw away your medicines at www.disposemymeds.org.          This list is accurate as of 7/16/18  1:40 PM.  Always use your most recent med list.                   Brand Name Dispense Instructions for use Diagnosis    * ACETAMINOPHEN PO      Take 1,000 mg by mouth every 6 hours as needed for pain or fever        * acetaminophen 500 MG tablet    TYLENOL    100 tablet    Take 2 tablets (1,000 mg) by mouth 3 times daily as needed for mild pain    Chronic low back  pain, unspecified back pain laterality, with sciatica presence unspecified       albuterol 108 (90 Base) MCG/ACT Inhaler    PROAIR HFA/PROVENTIL HFA/VENTOLIN HFA    1 Inhaler    Inhale 2 puffs into the lungs every 6 hours as needed for shortness of breath / dyspnea or wheezing    Pneumonia, organism unspecified(486)       aspirin 81 MG EC tablet     28 tablet    TAKE 1 TABLET (81MG) BY MOUTH DAILY    Coronary artery disease involving native heart with angina pectoris, unspecified vessel or lesion type (H)       benztropine 0.5 MG tablet    COGENTIN    60 tablet    Take 1 tablet (0.5 mg) by mouth 2 times daily    Extrapyramidal and movement disorder       blood glucose monitoring lancets     150 each    Use to test blood sugar 2 times daily or as directed.  Ok to substitute alternative if insurance prefers.    Type 2 diabetes mellitus with diabetic neuropathy, with long-term current use of insulin (H)       blood glucose monitoring test strip    ONETOUCH VERIO IQ    200 strip    Use to test blood sugar 4 times daily .  Ok to substitute alternative if insurance prefers.    Type 2 diabetes mellitus with diabetic neuropathy, with long-term current use of insulin (H)       calcium carbonate 1500 (600 Ca) MG tablet    OS-ALLEN 600 mg [place]. Ca    180 tablet    Take 1 tablet (1,500 mg) by mouth daily    Other osteoporosis without current pathological fracture       clotrimazole 1 % cream    LOTRIMIN     Apply topically 2 times daily        DIPHENDRYL PO      Take 25 mg by mouth every 6 hours as needed for itching        dulaglutide 1.5 MG/0.5ML pen    TRULICITY    2 mL    Inject 1.5 mg Subcutaneous every 7 days    Type 2 diabetes mellitus with mild nonproliferative retinopathy without macular edema, with long-term current use of insulin, unspecified laterality (H)       gabapentin 300 MG capsule    NEURONTIN    168 capsule    TAKE 2 CAPSULES (600MG) BY MOUTH THREE TIMES A DAY    Type 2 diabetes mellitus with diabetic  neuropathy, with long-term current use of insulin (H)       glucose 4 g Chew chewable tablet     20 tablet    Take 2 every 15 minutes for blood sugar <70mg/dL. Recheck blood sugar every 15 minutes until above 70mg/dL, then eat a substantial meal.    Type 2 diabetes mellitus with mild nonproliferative retinopathy without macular edema, with long-term current use of insulin, unspecified laterality (H)       guaiFENesin-codeine 100-10 MG/5ML Soln solution    ROBITUSSIN AC    120 mL    Take 5 mLs by mouth every 4 hours as needed for cough    Cough       ibuprofen 600 MG tablet    ADVIL/MOTRIN    60 tablet    Take 1 tablet (600 mg) by mouth every 4 hours as needed for moderate pain    Closed fracture of one rib of right side, initial encounter       insulin aspart 100 UNIT/ML injection    NovoLOG FLEXPEN    15 mL    Inject 20 Units Subcutaneous 3 times daily (with meals) Once daily, can add additional 5 units if BGs are >500mg/dL.    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, with long-term current use of insulin, unspecified laterality (H)       insulin pen needle 30G X 8 MM    NOVOFINE 30    400 each    USE 4 DAILY OR AS DIRECTED    Type 2 diabetes mellitus with mild nonproliferative retinopathy without macular edema, with long-term current use of insulin, unspecified laterality (H)       ipratropium - albuterol 0.5 mg/2.5 mg/3 mL 0.5-2.5 (3) MG/3ML neb solution    DUONEB    30 vial    Take 1 vial (3 mLs) by nebulization every 6 hours as needed for shortness of breath / dyspnea or wheezing    Pneumonia due to infectious organism, unspecified laterality, unspecified part of lung, Wheezing       losartan 100 MG tablet     COZAAR    28 tablet    TAKE 1 TABLET (100MG) BY MOUTH DAILY    Coronary artery disease involving native heart with angina pectoris, unspecified vessel or lesion type (H)       melatonin 5 MG Caps     180 capsule    Take 2 capsules by mouth daily At dinnertime    Insomnia, unspecified type       metoprolol succinate 25 MG 24 hr tablet    TOPROL-XL    30 tablet    Take 1 tablet (25 mg) by mouth daily    Coronary artery disease involving native heart with angina pectoris, unspecified vessel or lesion type (H)       * order for DME     1 each    Hold Novolog if meals are refused.    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    Generalized muscle weakness       order for DME     1 Units    Equipment being ordered: Nebulizer Machine    Wheezing, Cough, Pneumonia due to infectious organism, unspecified laterality, unspecified part of lung       paliperidone 9 MG 24 hr tablet    INVEGA    30 tablet    Take 1 tablet (9 mg) by mouth every morning    Schizoaffective disorder, bipolar type (H)       polyethylene glycol powder    MIRALAX/GLYCOLAX    527 g    TAKE 8.5 GRAMS (1/2 CAPFUL) BY MOUTH DAILY    Constipation, unspecified constipation type       prochlorperazine 5 MG tablet    COMPAZINE    90 tablet    Take 1 tablet (5 mg) by mouth every 6 hours as needed for nausea or vomiting    Nausea       ranitidine 300 MG tablet    ZANTAC    90 tablet    TAKE 1 TABLET (300MG) BY MOUTH AT BEDTIME    Gastroesophageal reflux disease without esophagitis       senna-docusate 8.6-50 MG per tablet    SENOKOT-S;PERICOLACE    100  tablet    Take 1 tablet by mouth 2 times daily as needed for constipation    Drug-induced constipation       sertraline 100 MG tablet    ZOLOFT    60 tablet    Take 2 tablets (200 mg) by mouth daily    Schizoaffective disorder, bipolar type (H)       SUMAtriptan 25 MG tablet    IMITREX    12 tablet    Take 1-2 tablets (25-50 mg) by mouth at onset of headache for migraine May repeat in 2 hours. Max 8 tablets/24 hours.    Migraine with aura and without status migrainosus, not intractable       topiramate 25 MG tablet    TOPAMAX    90 tablet    25mg at bedtime for week 1, 50mg at bedtime for 1 week, and 75mg at bedtime thereafter    Morbid obesity (H)       vitamin D3 17161 UNITS capsule    CHOLECALCIFEROL    12 capsule    TAKE 1 CAPSULE (50,000 UNITS) MONTHLY    Vitamin D deficiency       * Notice:  This list has 4 medication(s) that are the same as other medications prescribed for you. Read the directions carefully, and ask your doctor or other care provider to review them with you.

## 2018-07-16 NOTE — PROGRESS NOTES
The patient reported that she will hurt herself if she does not get access to this particular apartment-this writer encouraged the patient to think about what she will do once she gets the apartment-she stated that she will be happy-she had an interview for a job-she wants to be happy-she is to find out about the apartment next week-she continues to not use her CPAP due to fear of the man and feeling uncomfortable with the mask.

## 2018-07-16 NOTE — PROGRESS NOTES
SUBJECTIVE:                                                    Nena Tang is a 58 year old  female who presents to clinic today for the following health issues:  [facility], [place]: Richardson Lopez     Patient reports that she felt that she had a yeast infection while she was on her antibiotics and now she feels like it's getting better. Patient also reports that she will know next week about getting her own apartment. States that she had an interview at [employer] for 's position and will also be hearing back about the position in about a week.     Reports that she has been taking a new medication from the weight clinic to help with her appetite and help her reduce her weight.     Diabetes Follow-up  Patient had higher blood sugars [phone] when she had her pneumonia. This months BG .  Patient is checking blood sugars: 3 times daily.    Blood sugar testing frequency justification: Uncontrolled diabetes  Results are as follows:             Diabetic concerns: None     Symptoms of hypoglycemia (low blood sugar): none     Paresthesias (numbness or burning in feet) or sores: Denies any numbness or tingling.      Date of last diabetic eye exam: 2 weeks ago    BP Readings from Last 2 Encounters:   06/29/18 106/66   06/29/18 128/58     Hemoglobin A1C (%)   Date Value   05/22/2018 6.2 (H)   02/12/2018 6.8 (H)     LDL Cholesterol Calculated (mg/dL)   Date Value   06/27/2017 64   07/14/2015 78     LDL Cholesterol Direct (mg/dL)   Date Value   07/13/2016 137 (H)       Diabetes Management Resources      RESPIRATORY SYMPTOMS  Reports a small non-concerning cough, denies any chest pain.     Duration: last couple of weeks    Description  cough    Severity: mild    Accompanying signs and symptoms: None    History (predisposing factors):  none    Precipitating or alleviating factors: Improved on the second course of antibiotics after emergency room visit.     Therapies tried and outcome:  Done with cough syrup.        Mental Health Follow up   Patient reports that she will hurt herself if she does not get her new apartment. Reports that she has a way to do it and is not willing to share at this time. And states that I will know when she is in the hospital. Reports that she is happy about the possibility of being in her own space/apartment and cooking her own food, cleaning her own space and having her grand kids over for the night.     Status since last visit: improved     See PHQ-9 for current symptoms.  Other associated symptoms: None    Complicating factors: possibility of not getting an apartment.   Significant life event:  No   Current substance abuse:  None  Anxiety or Panic symptoms:  No    Sleep - not using her CPAP, feels like she is suffocation. Reports   Appetite - less food since starting on Topamax.   Exercise - trying walking.     Smoking - no  Alcohol - no  Street drugs - no  Marijuana - no    PHQ-9  English PHQ-9   Any Language               Problems taking medications regularly: No    Medication side effects: none    Diet: low fat/cholesterol    Social History   Substance Use Topics     Smoking status: Never Smoker     Smokeless tobacco: Never Used     Alcohol use No      Comment: last month        Problem list and histories reviewed & adjusted, as indicated.  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 (H)     Illiterate     Irritable bowel syndrome     overweight - BMI >35     Takotsubo cardiomyopathy     CAD (coronary artery disease)     Restless legs syndrome (RLS)     CINDY (obstructive sleep apnea)- mild AHI 10.3     Verbal auditory hallucination     Chronic low back pain     Schizoaffective disorder, depressive type (H)     Migraine headache     HTN, goal below 140/90     Health Care Home      Lumbago     Cervicalgia     Cocaine abuse, episodic     Suicidal ideation     Esophageal reflux     Mild nonproliferative diabetic retinopathy (H)     Tardive dyskinesia     Alcohol use     Left cataract     Falls frequently     History of uterine cancer     Psychophysiological insomnia     Dysuria     Asymptomatic postmenopausal status     Abdominal pain, right lower quadrant     Sepsis (H)     Pneumonia of right lower lobe due to infectious organism (H)     Infectious encephalopathy     Non-intractable vomiting with nausea     Acute respiratory failure with hypoxia (H)     Thoughts of self harm     Gastroenteritis     Posttraumatic stress disorder     Cervical cancer screening     Type 2 diabetes mellitus with stage 3 chronic kidney disease, with long-term current use of insulin (H)     Past Surgical History:   Procedure Laterality Date     C OOPHORECTORMY FOR MALIG, W/BX  1983    UTERINE     CATARACT IOL, RT/LT Bilateral 2017     COLONOSCOPY N/A 3/16/2017    Procedure: COLONOSCOPY;  Surgeon: Traci Gonzalez MD;  Location: [facility]     Coronary CTA  5/21/2014     HYSTERECTOMY  1983    uterine cancer yearly pap's per provider.     LAPAROSCOPIC CHOLECYSTECTOMY  2008     PHACOEMULSIFICATION CLEAR CORNEA WITH STANDARD INTRAOCULAR LENS IMPLANT Left 5/5/2017    Procedure: PHACOEMULSIFICATION CLEAR CORNEA WITH STANDARD INTRAOCULAR LENS IMPLANT;  LEFT EYE PHACOEMULSIFICATION CLEAR CORNEA WITH STANDARD INTRAOCULAR LENS IMPLANT ;  Surgeon: Tyra Diaz MD;  Location:  [facility]     PHACOEMULSIFICATION CLEAR CORNEA WITH STANDARD INTRAOCULAR LENS IMPLANT Right 6/30/2017    Procedure: PHACOEMULSIFICATION CLEAR CORNEA WITH STANDARD INTRAOCULAR LENS IMPLANT;  RIGHT EYE PHACOEMULSIFICATION CLEAR CORNEA WITH STANDARD INTRAOCULAR LENS IMPLANT;  Surgeon: Tyra Diaz MD;  Location:  [facility]     RELEASE TRIGGER FINGER  10/11/2012    Left thumb. Procedure: RELEASE TRIGGER FINGER;  LEFT THUMB TRIGGER RELEASE;  Surgeon:  Tya Langley MD;  Location:  [facility]     RELEASE TRIGGER FINGER Right 2016    Procedure: RELEASE TRIGGER FINGER;  Surgeon: Albino Castañeda MD;  Location:  [facility]       Social History   Substance Use Topics     Smoking status: Never Smoker     Smokeless tobacco: Never Used     Alcohol use No      Comment: last month     Family History   Problem Relation Age of Onset     Cancer Mother      BLADDER     Respiratory Mother      COPD     GASTROINTESTINAL DISEASE Mother      CIRRHOSIS OF LI BOLIVAR     Alcohol/Drug Mother      Diabetes Mother      Hypertension Mother      Lipids Mother      C.A.D. Mother      Glaucoma Mother      Alcohol/Drug Sister      Mental Illness Sister      Alcohol/Drug Sister      Psychotic Disorder Sister      Cancer Maternal Grandmother      UNKNOWN TYPE     Cancer Brother      COLON     Cancer - colorectal Brother      IN HIS LATE 30S     Alcohol/Drug Brother       OF HEROIN OVERDOSE AT AGE 22 YRS     Macular Degeneration No family hx of            Current Outpatient Prescriptions   Medication Sig Dispense Refill     acetaminophen (TYLENOL) 500 MG tablet Take 2 tablets (1,000 mg) by mouth 3 times daily as needed for mild pain 100 tablet 3     ACETAMINOPHEN PO Take 1,000 mg by mouth every 6 hours as needed for pain or fever       albuterol (PROAIR HFA/PROVENTIL HFA/VENTOLIN HFA) 108 (90 Base) MCG/ACT Inhaler Inhale 2 puffs into the lungs every 6 hours as needed for shortness of breath / dyspnea or wheezing 1 Inhaler 0     aspirin 81 MG EC tablet TAKE 1 TABLET (81MG) BY MOUTH DAILY 28 tablet 3     benztropine (COGENTIN) 0.5 MG tablet Take 1 tablet (0.5 mg) by mouth 2 times daily 60 tablet 1     blood glucose monitoring (ONE TOUCH DELICA) lancets Use to test blood sugar 2 times daily or as directed.  Ok to substitute alternative if insurance prefers. 150 each 11     blood glucose monitoring (ONETOUCH VERIO IQ) test strip Use to test blood sugar 4 times daily .  Ok to substitute  alternative if insurance prefers. 200 strip 11     calcium carbonate (OS-ALLEN 600 MG [place]. CA) 1500 (600 CA) MG tablet Take 1 tablet (1,500 mg) by mouth daily 180 tablet 3     clotrimazole (LOTRIMIN) 1 % cream Apply topically 2 times daily       DiphenhydrAMINE HCl (DIPHENDRYL PO) Take 25 mg by mouth every 6 hours as needed for itching       dulaglutide (TRULICITY) 1.5 MG/0.5ML pen Inject 1.5 mg Subcutaneous every 7 days 2 mL 3     gabapentin (NEURONTIN) 300 MG capsule TAKE 2 CAPSULES (600MG) BY MOUTH THREE TIMES A  capsule 0     glucose 4 G CHEW chewable tablet Take 2 every 15 minutes for blood sugar <70mg/dL. Recheck blood sugar every 15 minutes until above 70mg/dL, then eat a substantial meal. 20 tablet 1     guaiFENesin-codeine (ROBITUSSIN AC) 100-10 MG/5ML SOLN solution Take 5 mLs by mouth every 4 hours as needed for cough 120 mL 0     ibuprofen (ADVIL,MOTRIN) 600 MG tablet Take 1 tablet (600 mg) by mouth every 4 hours as needed for moderate pain 60 tablet 0     insulin aspart (NOVOLOG FLEXPEN) 100 UNIT/ML injection Inject 20 Units Subcutaneous 3 times daily (with meals) Once daily, can add additional 5 units if BGs are >500mg/dL. 15 mL 11     insulin glargine (LANTUS) 100 UNIT/ML injection Inject 48 Units Subcutaneous At Bedtime 3 mL 11     insulin pen needle (NOVOFINE 30) 30G X 8 MM USE 4 DAILY OR AS DIRECTED 400 each 0     ipratropium - albuterol 0.5 mg/2.5 mg/3 mL (DUONEB) 0.5-2.5 (3) MG/3ML neb solution Take 1 vial (3 mLs) by nebulization every 6 hours as needed for shortness of breath / dyspnea or wheezing 30 vial 0     losartan (COZAAR) 100 MG tablet TAKE 1 TABLET (100MG) BY MOUTH DAILY 28 tablet 3     melatonin 5 MG CAPS Take 2 capsules by mouth daily At dinnertime 180 capsule 3     metoprolol succinate (TOPROL-XL) 25 MG 24 hr tablet Take 1 tablet (25 mg) by mouth daily 30 tablet 1     order for DME Equipment being ordered: Nebulizer Machine 1 Units 0     order for DME Equipment being ordered: 4  Wheeled walker with seat and brakes. 1 each 0     order for DME Diabetic Shoes 2 each 0     order for DME Hold Novolog if meals are refused. 1 each 0     paliperidone (INVEGA) 9 MG 24 hr tablet Take 1 tablet (9 mg) by mouth every morning 30 tablet 3     polyethylene glycol (MIRALAX/GLYCOLAX) powder TAKE 8.5 GRAMS (1/2 CAPFUL) BY MOUTH DAILY 527 g 3     prochlorperazine (COMPAZINE) 5 MG tablet Take 1 tablet (5 mg) by mouth every 6 hours as needed for nausea or vomiting 90 tablet 0     ranitidine (ZANTAC) 300 MG tablet TAKE 1 TABLET (300MG) BY MOUTH AT BEDTIME 90 tablet 1     senna-docusate (SENOKOT-S;PERICOLACE) 8.6-50 MG per tablet Take 1 tablet by mouth 2 times daily as needed for constipation 100 tablet 1     sertraline (ZOLOFT) 100 MG tablet Take 2 tablets (200 mg) by mouth daily 60 tablet 1     SUMAtriptan (IMITREX) 25 MG tablet Take 1-2 tablets (25-50 mg) by mouth at onset of headache for migraine May repeat in 2 hours. Max 8 tablets/24 hours. 12 tablet 1     topiramate (TOPAMAX) 25 MG tablet 25mg at bedtime for week 1, 50mg at bedtime for 1 week, and 75mg at bedtime thereafter 90 tablet 3     vitamin D3 (CHOLECALCIFEROL) 83916 UNITS capsule TAKE 1 CAPSULE (50,000 UNITS) MONTHLY 12 capsule 1     Allergies   Allergen Reactions     Imidazole Antifungals Hives     Tolerates diflucan     Ketoprofen Itching     Pruritis to topical     Lisinopril Hives     Metformin Other (See Comments)     Patient hospitalized for lactic acidosis - admitting provider suspectd caused by metformin     Metronidazole Hives     Posaconazole Hives     Tolerates diflucan     Recent Labs   Lab Test  06/29/18   1333  05/28/18   1625  05/22/18   1434  02/12/18   1408  02/08/18   2155   01/13/18   2315  12/09/17   0748   11/07/17   0801   06/27/17   1358   12/29/16   0909   07/13/16   1350   07/14/15   1215   09/03/13   1156   A1C   --    --   6.2*  6.8*   --    --    --   8.9*   --   8.9*   < >  7.0*   < >   --    < >   --    < >  9.1*   < >   10.8*   LDL   --    --    --    --    --    --    --    --    --    --    --   64   --    --    --   137*   --   78   < >  90   HDL   --    --    --    --    --    --    --    --    --    --    --   37*   --    --    --    --    --   39*   --   37*   TRIG   --    --    --    --    --    --    --    --    --    --    --   267*   --    --    --    --    --   151*   --   171*   ALT   --   27   --    --   23   --   29   --    --   23   < >  32   < >  26   < >   --    < >   --    < >  38   CR  1.06*  1.16*   --    --   1.09*   < >  1.62*   --    < >  0.88   < >  0.78   < >  0.72   < >   --    < >  0.67   < >  0.54   GFRESTIMATED  53*  48*   --    --   52*   < >  33*   --    < >  66   < >  76   < >  83   < >   --    < >  >90  Non  GFR Calc     < >  >90   GFRESTBLACK  65  58*   --    --   63   < >  40*   --    < >  80   < >  >90   GFR Calc     < >  >90   GFR Calc     < >   --    < >  >90   GFR Calc     < >  >90   POTASSIUM  4.7  4.5   --    --   4.3   < >  4.0   --    < >  3.9   < >  4.3   < >  4.2   < >   --    < >  4.3   < >  4.4   TSH   --    --    --    --    --    --    --    --    --   3.21   --    --    --   2.24   < >   --    < >   --    < >  1.42    < > = values in this interval not displayed.      BP Readings from Last 3 Encounters:   06/29/18 106/66   06/29/18 128/58   06/22/18 128/58    Wt Readings from Last 3 Encounters:   06/29/18 232 lb (105.2 kg)   06/22/18 236 lb (107 kg)   06/07/18 237 lb 4.8 oz (107.6 kg)        Labs reviewed in EPIC  Problem list, Medication list, Allergies, and Medical/Social/Surgical histories reviewed in EPIC and updated as appropriate.     ROS: Constitutional, neuro, ENT, endocrine, pulmonary, cardiac, gastrointestinal, genitourinary, musculoskeletal, integument and psychiatric systems are negative, except as otherwise noted above in the HPI.   OBJECTIVE:                                                    BP 92/58 (BP  Location: Right arm)  Pulse 89  Temp 98.2  F (36.8  C) (Oral)  Wt 235 lb (106.6 kg)  LMP 01/06/2015  SpO2 97%  BMI 44.95 kg/m2  Body mass index is 44.95 kg/(m^2).  GENERAL: alert, no distress  EYES: Eyes grossly normal to inspection, extraocular movements - intact  RESP:no shortness of breath.   MS: Slow to get up from chair and gait.   NEURO: A&O x4  Mental Status Assessment:  Appearance:   Appropriate   Eye Contact:   Good   Psychomotor Behavior: Normal   Attitude:   Cooperative   Orientation:   All  Speech   Rate / Production: Normal    Volume:  Normal   Mood:    Normal  Affect:    Appropriate   Thought Content:  Clear   Thought Form:  Coherent  Logical   Insight:    Poor   Attention Span and Concentration:  appropriate  Recent and Remote Memory:  intact  Fund of Knowledge: appropriate  Muscle Strength and Tone: normal   Suicidal Ideation: reports thoughts, with intention; if she does not get her apartment next week.   Hallucination: Yes  Paranoid-No  Manic-No  Panic-No  Self harm-thoughts.     See [facility] notes     Diagnostic Test Results:  Pending.      ASSESSMENT/PLAN:                                                    (E11.22,  N18.3,  Z79.4) Type 2 diabetes mellitus with stage 3 chronic kidney disease, with long-term current use of insulin (H)  (primary encounter diagnosis)  Comment: noted above.   Plan: Lipid panel reflex to direct LDL Fasting,         Albumin Random Urine Quantitative with Creat         Ratio        Will call patient with results and send letter out with results.     (E66.01) overweight - BMI >35  Comment: Improvement on food choices. Patient working with the weight clinic.   Wt Readings from Last 4 Encounters:   07/16/18 235 lb (106.6 kg)   06/29/18 232 lb (105.2 kg)   06/22/18 236 lb (107 kg)   06/07/18 237 lb 4.8 oz (107.6 kg)     Plan: Lipid panel reflex to direct LDL Fasting        Encouraged patient to start working more to increase her activity level, for weight management and  also reduce pain to her back and hips.     (Z11.4) Screening for HIV (human immunodeficiency virus)  Comment: Routine screening.   Plan: HIV Screening            Patient Instructions   -Work on increasing activity and managing her weight loss.     -Follow-up in 2 weeks.     I spent 25 min spent in direct face to face time with Nena Tang, greater than 50% in counseling and coordination of care for:  Diabetes, Obesity, Routine screening and Depression and Anxiety.       [facility]

## 2018-07-16 NOTE — MR AVS SNAPSHOT
After Visit Summary   7/16/2018    Nena Tang    MRN: [id number]           Patient Information     Date Of Birth          1960        Visit Information        Provider Department      7/16/2018 10:30 AM Rowena Haas APRN CNP [facility]        Today's Diagnoses     Screening for HIV (human immunodeficiency virus)    -  1    Type 2 diabetes mellitus with stage 3 chronic kidney disease, with long-term current use of insulin (H)        overweight - BMI >35          Care Instructions    -Work on increasing activity and managing her weight loss.     -Follow-up in 2 weeks.           Follow-ups after your visit        Your next 10 appointments already scheduled     Aug 30, 2018  3:30 PM CDT   RETURN RETINA with Tiburcio Chacon MD   [facility] ([facility])    [address]  [address]  [facility] [address]   [phone]            Sep 07, 2018  3:00 PM CDT   Return Visit with Kraig Pedersen MD   [facility] ([facility])    [address] [place], [place] [address] [id number] [phone]            Sep 10, 2018 10:45 AM CDT   (Arrive by 10:30 AM)   Return Weight Management Visit with Debbie Germain PA-C   [facility] ([facility])    [address] [facility]  [address]th [facility] [id number] [phone]              Who to contact     If you have questions or need follow up information about today's clinic visit or your schedule please contact [facility] directly at [phone].  Normal or non-critical lab and imaging results will be communicated to you by MyChart, letter or phone within 4 business days after the clinic has received the results. If you do not hear from us within 7 days, please contact the clinic through MyChart or phone. If you have a critical or  abnormal lab result, we will notify you by phone as soon as possible.  Submit refill requests through [employer] or call your pharmacy and they will forward the refill request to us. Please allow 3 business days for your refill to be completed.          Additional Information About Your Visit        Gojimohart Information     [employer] gives you secure access to your electronic health record. If you see a primary care provider, you can also send messages to your care team and make appointments. If you have questions, please call your primary care clinic.  If you do not have a primary care provider, please call [phone] and they will assist you.        Care EveryWhere ID     This is your Care EveryWhere ID. This could be used by other organizations to access your [facility] medical records  [id number]        Your Vitals Were     Pulse Temperature Last Period Pulse Oximetry BMI (Body Mass Index)       89 98.2  F (36.8  C) (Oral) 01/06/2015 97% 44.95 kg/m2        Blood Pressure from Last 3 Encounters:   07/16/18 92/58   06/29/18 106/66   06/29/18 128/58    Weight from Last 3 Encounters:   07/16/18 235 lb (106.6 kg)   06/29/18 232 lb (105.2 kg)   06/22/18 236 lb (107 kg)              We Performed the Following     Albumin Random Urine Quantitative with Creat Ratio     HIV Screening     Lipid panel reflex to direct LDL Fasting        Primary Care Provider Office Phone # Fax #    Rowena RAT Blanton [phone]       [address] [facility] [address]  [facility] [address]        Goals        General    Medical (pt-stated)     Notes - Note created  7/7/2016  9:15 AM by Chelsea Pulido, RN    Get blood sugars under control    Improve medication compliance    As of today's date 7/7/2016 goal is met at 0 - 25%.   Goal Status:  Active          Equal Access to Services     RAMESH GARRIDO : Azul Rios, waaxda lutanikaadaha, qaybta kaalmada hossein, lorena martins. So [facility]  [phone].    ATENCIÓN: Si mary alice estrada, tiene a trinh disposición servicios gratuitos de asistencia lingüística. Smith zacarias [phone].    We comply with applicable federal civil rights laws and [place] laws. We do not discriminate on the basis of race, color, national origin, age, disability, sex, sexual orientation, or gender identity.            Thank you!     Thank you for choosing [facility]  for your care. Our goal is always to provide you with excellent care. Hearing back from our patients is one way we can continue to improve our services. Please take a few minutes to complete the written survey that you may receive in the mail after your visit with us. Thank you!             Your Updated Medication List - Protect others around you: Learn how to safely use, store and throw away your medicines at www.disposemymeds.org.          This list is accurate as of 7/16/18 11:42 AM.  Always use your most recent med list.                   Brand Name Dispense Instructions for use Diagnosis    * ACETAMINOPHEN PO      Take 1,000 mg by mouth every 6 hours as needed for pain or fever        * acetaminophen 500 MG tablet    TYLENOL    100 tablet    Take 2 tablets (1,000 mg) by mouth 3 times daily as needed for mild pain    Chronic low back pain, unspecified back pain laterality, with sciatica presence unspecified       albuterol 108 (90 Base) MCG/ACT Inhaler    PROAIR HFA/PROVENTIL HFA/VENTOLIN HFA    1 Inhaler    Inhale 2 puffs into the lungs every 6 hours as needed for shortness of breath / dyspnea or wheezing    Pneumonia, organism unspecified(486)       aspirin 81 MG EC tablet     28 tablet    TAKE 1 TABLET (81MG) BY MOUTH DAILY    Coronary artery disease involving native heart with angina pectoris, unspecified vessel or lesion type (H)       benztropine 0.5 MG tablet    COGENTIN    60 tablet    Take 1 tablet (0.5 mg) by mouth 2 times daily    Extrapyramidal and movement disorder        blood glucose monitoring lancets     150 each    Use to test blood sugar 2 times daily or as directed.  Ok to substitute alternative if insurance prefers.    Type 2 diabetes mellitus with diabetic neuropathy, with long-term current use of insulin (H)       blood glucose monitoring test strip    ONETOUCH VERIO IQ    200 strip    Use to test blood sugar 4 times daily .  Ok to substitute alternative if insurance prefers.    Type 2 diabetes mellitus with diabetic neuropathy, with long-term current use of insulin (H)       calcium carbonate 1500 (600 Ca) MG tablet    OS-ALLEN 600 mg [place]. Ca    180 tablet    Take 1 tablet (1,500 mg) by mouth daily    Other osteoporosis without current pathological fracture       clotrimazole 1 % cream    LOTRIMIN     Apply topically 2 times daily        DIPHENDRYL PO      Take 25 mg by mouth every 6 hours as needed for itching        dulaglutide 1.5 MG/0.5ML pen    TRULICITY    2 mL    Inject 1.5 mg Subcutaneous every 7 days    Type 2 diabetes mellitus with mild nonproliferative retinopathy without macular edema, with long-term current use of insulin, unspecified laterality (H)       gabapentin 300 MG capsule    NEURONTIN    168 capsule    TAKE 2 CAPSULES (600MG) BY MOUTH THREE TIMES A DAY    Type 2 diabetes mellitus with diabetic neuropathy, with long-term current use of insulin (H)       glucose 4 g Chew chewable tablet     20 tablet    Take 2 every 15 minutes for blood sugar <70mg/dL. Recheck blood sugar every 15 minutes until above 70mg/dL, then eat a substantial meal.    Type 2 diabetes mellitus with mild nonproliferative retinopathy without macular edema, with long-term current use of insulin, unspecified laterality (H)       guaiFENesin-codeine 100-10 MG/5ML Soln solution    ROBITUSSIN AC    120 mL    Take 5 mLs by mouth every 4 hours as needed for cough    Cough       ibuprofen 600 MG tablet    ADVIL/MOTRIN    60 tablet    Take 1 tablet (600 mg) by mouth every 4 hours as needed for  moderate pain    Closed fracture of one rib of right side, initial encounter       insulin aspart 100 UNIT/ML injection    NovoLOG FLEXPEN    15 mL    Inject 20 Units Subcutaneous 3 times daily (with meals) Once daily, can add additional 5 units if BGs are >500mg/dL.    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, with long-term current use of insulin, unspecified laterality (H)       insulin pen needle 30G X 8 MM    NOVOFINE 30    400 each    USE 4 DAILY OR AS DIRECTED    Type 2 diabetes mellitus with mild nonproliferative retinopathy without macular edema, with long-term current use of insulin, unspecified laterality (H)       ipratropium - albuterol 0.5 mg/2.5 mg/3 mL 0.5-2.5 (3) MG/3ML neb solution    DUONEB    30 vial    Take 1 vial (3 mLs) by nebulization every 6 hours as needed for shortness of breath / dyspnea or wheezing    Pneumonia due to infectious organism, unspecified laterality, unspecified part of lung, Wheezing       losartan 100 MG tablet    COZAAR    28 tablet    TAKE 1 TABLET (100MG) BY MOUTH DAILY    Coronary artery disease involving native heart with angina pectoris, unspecified vessel or lesion type (H)       melatonin 5 MG Caps     180 capsule    Take 2 capsules by mouth daily At dinnertime    Insomnia, unspecified type       metoprolol succinate 25 MG 24 hr tablet    TOPROL-XL    30 tablet    Take 1 tablet (25 mg) by mouth daily    Coronary artery disease involving native heart with angina pectoris, unspecified vessel or lesion type (H)       * order for DME     1 each    Hold Novolog if meals are refused.    Type 2 diabetes mellitus with mild nonproliferative retinopathy without macular  edema, with long-term current use of insulin, unspecified laterality (H)       * order for DME     2 each    Diabetic Shoes    Type 2 diabetes mellitus with mild nonproliferative retinopathy without macular edema, with long-term current use of insulin, unspecified laterality (H)       order for DME     1 each    Equipment being ordered: 4 Wheeled walker with seat and brakes.    Generalized muscle weakness       order for DME     1 Units    Equipment being ordered: Nebulizer Machine    Wheezing, Cough, Pneumonia due to infectious organism, unspecified laterality, unspecified part of lung       paliperidone 9 MG 24 hr tablet    INVEGA    30 tablet    Take 1 tablet (9 mg) by mouth every morning    Schizoaffective disorder, bipolar type (H)       polyethylene glycol powder    MIRALAX/GLYCOLAX    527 g    TAKE 8.5 GRAMS (1/2 CAPFUL) BY MOUTH DAILY    Constipation, unspecified constipation type       prochlorperazine 5 MG tablet    COMPAZINE    90 tablet    Take 1 tablet (5 mg) by mouth every 6 hours as needed for nausea or vomiting    Nausea       ranitidine 300 MG tablet    ZANTAC    90 tablet    TAKE 1 TABLET (300MG) BY MOUTH AT BEDTIME    Gastroesophageal reflux disease without esophagitis       senna-docusate 8.6-50 MG per tablet    SENOKOT-S;PERICOLACE    100 tablet    Take 1 tablet by mouth 2 times daily as needed for constipation    Drug-induced constipation       sertraline 100 MG tablet    ZOLOFT    60 tablet    Take 2 tablets (200 mg) by mouth daily    Schizoaffective disorder, bipolar type (H)       SUMAtriptan 25 MG tablet    IMITREX    12 tablet    Take 1-2 tablets (25-50 mg) by mouth at onset of headache for migraine May repeat in 2 hours. Max 8 tablets/24 hours.    Migraine with aura and without status migrainosus, not intractable       topiramate 25 MG tablet    TOPAMAX    90 tablet    25mg at bedtime for week 1, 50mg at bedtime for 1 week, and 75mg at bedtime thereafter    Morbid obesity (H)        vitamin D3 91621 UNITS capsule    CHOLECALCIFEROL    12 capsule    TAKE 1 CAPSULE (50,000 UNITS) MONTHLY    Vitamin D deficiency       * Notice:  This list has 4 medication(s) that are the same as other medications prescribed for you. Read the directions carefully, and ask your doctor or other care provider to review them with you.

## 2018-07-30 DIAGNOSIS — E11.40 TYPE 2 DIABETES MELLITUS WITH DIABETIC NEUROPATHY, WITH LONG-TERM CURRENT USE OF INSULIN (H): ICD-10-CM

## 2018-07-30 DIAGNOSIS — Z79.4 TYPE 2 DIABETES MELLITUS WITH DIABETIC NEUROPATHY, WITH LONG-TERM CURRENT USE OF INSULIN (H): ICD-10-CM

## 2018-07-30 NOTE — TELEPHONE ENCOUNTER
gabapentin (NEURONTIN) 300 MG capsule  Requested Prescriptions  Last Written Prescription Date:  7/3/18  Last Fill Quantity: 168,  # refills: 0   Last office visit: 7/16/2018 with prescribing provider:     Future Office Visit:   Next 5 appointments (look out 90 days)     Aug 06, 2018 11:00 AM CDT   Return Visit with ART Arias CNP   [facility] ([facility])    [address]  [address]  [facility] [address]   [phone]            Aug 06, 2018 11:00 AM CDT   Return Visit with Rcihardson Lopez [facility] ([facility])    [address]  [address]  [facility] [address]   [phone]            Sep 07, 2018  3:00 PM CDT   Return Visit with Kraig Pedersen MD   [facility] ([facility])    [address]  [facility] [address]   [phone]                    Pending Prescriptions Disp Refills     gabapentin (NEURONTIN) 300 MG capsule 168 capsule 0     Sig: TAKE 2 CAPSULES (600MG) BY MOUTH THREE TIMES A DAY    There is no refill protocol information for this order

## 2018-08-01 DIAGNOSIS — F25.0 SCHIZOAFFECTIVE DISORDER, BIPOLAR TYPE (H): ICD-10-CM

## 2018-08-01 DIAGNOSIS — I25.119 CORONARY ARTERY DISEASE INVOLVING NATIVE HEART WITH ANGINA PECTORIS, UNSPECIFIED VESSEL OR LESION TYPE (H): ICD-10-CM

## 2018-08-01 NOTE — TELEPHONE ENCOUNTER
sertraline (ZOLOFT) 100 MG tablet  Last Written Prescription Date:  6/8/18  Last Fill Quantity: 60,  # refills: 1   Last office visit: 7/16/2018 with prescribing provider:     Future Office Visit:   Next 5 appointments (look out 90 days)     Aug 06, 2018 11:00 AM CDT   Return Visit with ART Arias CNP   [facility] ([facility])    [address]  [address]  [facility] [place] [address]   [phone]            Aug 06, 2018 11:00 AM CDT   Return Visit with Richardson Lopez [facility] ([facility])    [address]  [address]  [facility] [place] [address]   [phone]            Sep 07, 2018  3:00 PM CDT   Return Visit with Kraig Pedersen MD   [facility] ([facility]    [address]  [facility] [place] [address]   [phone]                   metoprolol succinate (TOPROL-XL) 25 MG 24 hr tablet  Last Written Prescription Date:  6/4/18  Last Fill Quantity: 30,  # refills: 1   Last office visit: 7/16/2018 with prescribing provider:     Future Office Visit:   Next 5 appointments (look out 90 days)     Aug 06, 2018 11:00 AM CDT   Return Visit with ART Arias CNP   [facility] ([facility])    [address]  [address]  [facility] [place] [address]   [phone]            Aug 06, 2018 11:00 AM CDT   Return Visit with Richardson Lopez [facility] ([facility])    [address]  [address]  [facility] [place] [address]   [phone]            Sep 07, 2018  3:00 PM CDT   Return Visit with Kraig Pedersen MD   [facility] ([facility])    [address]  [facility] [place] [address]   [phone]                   Requested Prescriptions  "  Pending Prescriptions Disp Refills     metoprolol succinate (TOPROL-XL) 25 MG 24 hr tablet 30 tablet 1     Sig: Take 1 tablet (25 mg) by mouth daily    Beta-Blockers Protocol Passed    8/1/2018  4:21 PM       Passed - Blood pressure under 140/90 in past 12 months    BP Readings from Last 3 Encounters:   07/16/18 92/58   06/29/18 106/66   06/29/18 128/58                Passed - Patient is age 6 or older       Passed - Recent (12 mo) or future (30 days) visit within the authorizing provider's specialty    Patient had office visit in the last 12 months or has a visit in the next 30 days with authorizing provider or within the authorizing provider's specialty.  See \"Patient Info\" tab in inbasket, or \"Choose Columns\" in Meds & Orders section of the refill encounter.            sertraline (ZOLOFT) 100 MG tablet 60 tablet 1     Sig: Take 2 tablets (200 mg) by mouth daily    SSRIs Protocol Passed    8/1/2018  4:21 PM       Passed - Recent (12 mo) or future (30 days) visit within the authorizing provider's specialty    Patient had office visit in the last 12 months or has a visit in the next 30 days with authorizing provider or within the authorizing provider's specialty.  See \"Patient Info\" tab in inbasket, or \"Choose Columns\" in Meds & Orders section of the refill encounter.           Passed - Patient is age 18 or older       Passed - No active pregnancy on record       Passed - No positive pregnancy test in last 12 months          "

## 2018-08-01 NOTE — TELEPHONE ENCOUNTER
Problem List Complete:  Yes    Clinic visit frequency required: unspecified     Controlled substance agreement on file: No.     Processing:  may be e-scribed   checked in past 3 months?  No, route to RN [place]  reviewed 8/1/2018  9:40 AM    Last refill 7/5/18  Disp: 168    Writer notes patient filled controlled substance outside of clinic for VIRTUSSIN AC LIQUID - per NATASHA SARABIA MD - [place] [facility] provider    Thank you,  Gaye Umana RN

## 2018-08-02 RX ORDER — METOPROLOL SUCCINATE 25 MG/1
25 TABLET, EXTENDED RELEASE ORAL DAILY
Qty: 30 TABLET | Refills: 1 | Status: SHIPPED | OUTPATIENT
Start: 2018-08-02 | End: 2018-10-01

## 2018-08-02 RX ORDER — SERTRALINE HYDROCHLORIDE 100 MG/1
200 TABLET, FILM COATED ORAL DAILY
Qty: 60 TABLET | Refills: 1 | Status: SHIPPED | OUTPATIENT
Start: 2018-08-02 | End: 2018-08-31

## 2018-08-02 RX ORDER — GABAPENTIN 300 MG/1
CAPSULE ORAL
Qty: 168 CAPSULE | Refills: 1 | Status: SHIPPED | OUTPATIENT
Start: 2018-08-02 | End: 2018-09-24

## 2018-08-06 ENCOUNTER — OFFICE VISIT (OUTPATIENT)
Dept: BEHAVIORAL HEALTH | Facility: CLINIC | Age: 58
End: 2018-08-06
Payer: MEDICARE

## 2018-08-06 ENCOUNTER — OFFICE VISIT (OUTPATIENT)
Dept: FAMILY MEDICINE | Facility: CLINIC | Age: 58
End: 2018-08-06
Payer: MEDICARE

## 2018-08-06 VITALS
BODY MASS INDEX: 45.52 KG/M2 | TEMPERATURE: 98.1 F | HEART RATE: 82 BPM | DIASTOLIC BLOOD PRESSURE: 64 MMHG | SYSTOLIC BLOOD PRESSURE: 122 MMHG | WEIGHT: 238 LBS | OXYGEN SATURATION: 95 % | RESPIRATION RATE: 18 BRPM

## 2018-08-06 DIAGNOSIS — E11.22 TYPE 2 DIABETES MELLITUS WITH STAGE 3 CHRONIC KIDNEY DISEASE, WITH LONG-TERM CURRENT USE OF INSULIN (H): Primary | ICD-10-CM

## 2018-08-06 DIAGNOSIS — F25.1 SCHIZOAFFECTIVE DISORDER, DEPRESSIVE TYPE (H): Primary | ICD-10-CM

## 2018-08-06 DIAGNOSIS — Z12.31 VISIT FOR SCREENING MAMMOGRAM: ICD-10-CM

## 2018-08-06 DIAGNOSIS — Z79.4 TYPE 2 DIABETES MELLITUS WITH STAGE 3 CHRONIC KIDNEY DISEASE, WITH LONG-TERM CURRENT USE OF INSULIN (H): Primary | ICD-10-CM

## 2018-08-06 DIAGNOSIS — N18.30 TYPE 2 DIABETES MELLITUS WITH STAGE 3 CHRONIC KIDNEY DISEASE, WITH LONG-TERM CURRENT USE OF INSULIN (H): Primary | ICD-10-CM

## 2018-08-06 DIAGNOSIS — F43.10 POSTTRAUMATIC STRESS DISORDER: ICD-10-CM

## 2018-08-06 DIAGNOSIS — E66.01 MORBID OBESITY (H): ICD-10-CM

## 2018-08-06 LAB
CHOLEST SERPL-MCNC: 173 MG/DL
HBA1C MFR BLD: 6.4 % (ref 0–5.6)
HDLC SERPL-MCNC: 46 MG/DL
LDLC SERPL CALC-MCNC: 84 MG/DL
NONHDLC SERPL-MCNC: 127 MG/DL
TRIGL SERPL-MCNC: 215 MG/DL

## 2018-08-06 PROCEDURE — 80061 LIPID PANEL: CPT | Performed by: NURSE PRACTITIONER

## 2018-08-06 PROCEDURE — 90834 PSYTX W PT 45 MINUTES: CPT | Performed by: SOCIAL WORKER

## 2018-08-06 PROCEDURE — 36415 COLL VENOUS BLD VENIPUNCTURE: CPT | Performed by: NURSE PRACTITIONER

## 2018-08-06 PROCEDURE — 99214 OFFICE O/P EST MOD 30 MIN: CPT | Performed by: NURSE PRACTITIONER

## 2018-08-06 PROCEDURE — 83036 HEMOGLOBIN GLYCOSYLATED A1C: CPT | Performed by: NURSE PRACTITIONER

## 2018-08-06 NOTE — LETTER
August 7, 2018      Nena Tang  [address] [facility] [address]        Dear MsSal,    We are writing to inform you of your test results.    Your test results fall within the expected range(s) or remain unchanged from previous results.  Please continue with current treatment plan.    Resulted Orders   HEMOGLOBIN A1C   Result Value Ref Range    Hemoglobin A1C 6.4 (H) 0 - 5.6 %      Comment:      Normal <5.7% Prediabetes 5.7-6.4%  Diabetes 6.5% or higher - adopted from ADA   consensus guidelines.     Lipid panel reflex to direct LDL Fasting   Result Value Ref Range    Cholesterol 173 <200 mg/dL    Triglycerides 215 (H) <150 mg/dL      Comment:      Borderline high:  150-199 mg/dl  High:             200-499 mg/dl  Very high:       >499 mg/dl  Non Fasting      HDL Cholesterol 46 (L) >49 mg/dL    LDL Cholesterol Calculated 84 <100 mg/dL      Comment:      Desirable:       <100 mg/dl    Non HDL Cholesterol 127 <130 mg/dL       If you have any questions or concerns, please call the clinic at the number listed above.       Sincerely,        ART Fay CNP

## 2018-08-06 NOTE — LETTER
August 6, 2018      Nena Tang  [address] [facility] [address]        Dear ,    We are writing to inform you of your test results.  Resulted Orders   HEMOGLOBIN A1C   Result Value Ref Range    Hemoglobin A1C 6.4 (H) 0 - 5.6 %      Comment:      Normal <5.7% Prediabetes 5.7-6.4%  Diabetes 6.5% or higher - adopted from ADA   consensus guidelines.     Lipid panel reflex to direct LDL Fasting   Result Value Ref Range    Cholesterol 173 <200 mg/dL    Triglycerides 215 (H) <150 mg/dL      Comment:      Borderline high:  150-199 mg/dl  High:             200-499 mg/dl  Very high:       >499 mg/dl  Non Fasting      HDL Cholesterol 46 (L) >49 mg/dL    LDL Cholesterol Calculated 84 <100 mg/dL      Comment:      Desirable:       <100 mg/dl    Non HDL Cholesterol 127 <130 mg/dL     If you have any questions or concerns, please call the clinic at the number listed above.       Sincerely,        ART Fay CNP

## 2018-08-06 NOTE — Clinical Note
Hi, Can you please schedule her for a mammogram one of the days she is on schedule to come to clinic. Thanks! Rowena

## 2018-08-06 NOTE — PATIENT INSTRUCTIONS
-Will call patient with appointment for mammogram.     -Call clinic with any questions or concerns.

## 2018-08-06 NOTE — PROGRESS NOTES
"SUBJECTIVE:                                                    Nena Tang is a 58 year old  female who presents to clinic today for the following health issues:  [facility]: Don    Patient reports that she was denied her apartment and she will be appealing this decision with the help of her CADI worker, ILS worker, , Alva-her care giver and her son. Patient reports that she did not hurt herself as she had previously reported at the last visit.     Reports that she is having a good day today.     Diabetes Follow-up  Patient with reports showing his logged blood sugars; morning fastin-224, Midday: 100-253, Night time:  .   Patient is checking blood sugars: 3-4 times daily.    Blood sugar testing frequency justification: Uncontrolled diabetes  Results are as follows:             Diabetic concerns: None     Symptoms of hypoglycemia (low blood sugar): none     Paresthesias (numbness or burning in feet) or sores: Yes numbness and burning     Date of last diabetic eye exam: upcoming exam at the end of this month    BP Readings from Last 2 Encounters:   18 92/58   18 106/66     Hemoglobin A1C (%)   Date Value   2018 6.2 (H)   2018 6.8 (H)     LDL Cholesterol Calculated (mg/dL)   Date Value   2017 64   2015 78     LDL Cholesterol Direct (mg/dL)   Date Value   2016 137 (H)       Diabetes Management Resources        Mental Health Follow up   Patient reports feeling \"a little suicidal today\", reports that she is thinking about how to do it. Plans to go on a trip with his son and grandkids which she is really looking forward to.  Patient reports that she has her meeting this coming Wednesday and this might affect how she deals with this situation.     Status since last visit: having more positive outlook to her apartment situation and decision.     See PHQ-9 for current symptoms.  Other associated symptoms: None    Complicating factors: some " suicidal thoughts.   Significant life event:  No   Current substance abuse:  None  Anxiety or Panic symptoms:  No    Sleep - visual hallucination at night. Has not been getting good sleep because she is not using her CPAP at night.  Appetite - good, no issues.   Exercise - walking more now.     Smoking - no  Alcohol - no  Street drugs - no  Marijuana - no  Caffeine - soda.     PHQ-9  English PHQ-9   Any Language               Problems taking medications regularly: No    Medication side effects: none    Diet: regular (no restrictions)    Social History   Substance Use Topics     Smoking status: Never Smoker     Smokeless tobacco: Never Used     Alcohol use No      Comment: last month        Problem list and histories reviewed & adjusted, as indicated.  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 (H)     Illiterate     Irritable bowel syndrome     overweight - BMI >35     Takotsubo cardiomyopathy     CAD (coronary artery disease)     Restless legs syndrome (RLS)     CINDY (obstructive sleep apnea)- mild AHI 10.3     Verbal auditory hallucination     Chronic low back pain     Schizoaffective disorder, depressive type (H)     Migraine headache     HTN, goal below 140/90     Health Care Home     Lumbago     Cervicalgia     Cocaine abuse, episodic     Suicidal ideation     Esophageal reflux     Mild nonproliferative diabetic retinopathy (H)     Tardive dyskinesia     Alcohol use     Left cataract     Falls frequently     History of uterine cancer     Psychophysiological insomnia     Dysuria     Asymptomatic postmenopausal status     Abdominal pain, right lower quadrant     Sepsis (H)     Pneumonia of right lower lobe due to infectious organism (H)     Infectious encephalopathy     Non-intractable vomiting with  nausea     Acute respiratory failure with hypoxia (H)     Thoughts of self harm     Gastroenteritis     Posttraumatic stress disorder     Cervical cancer screening     Type 2 diabetes mellitus with stage 3 chronic kidney disease, with long-term current use of insulin (H)     Past Surgical History:   Procedure Laterality Date     C OOPHORECTORMY FOR RAHEL, W/BX  1983    UTERINE     CATARACT IOL, RT/LT Bilateral 2017     COLONOSCOPY N/A 3/16/2017    Procedure: COLONOSCOPY;  Surgeon: Traci Gonzalez MD;  Location:  GI     Coronary CTA  5/21/2014     HYSTERECTOMY  1983    uterine cancer yearly pap's per provider.     LAPAROSCOPIC CHOLECYSTECTOMY  2008     PHACOEMULSIFICATION CLEAR CORNEA WITH STANDARD INTRAOCULAR LENS IMPLANT Left 5/5/2017    Procedure: PHACOEMULSIFICATION CLEAR CORNEA WITH STANDARD INTRAOCULAR LENS IMPLANT;  LEFT EYE PHACOEMULSIFICATION CLEAR CORNEA WITH STANDARD INTRAOCULAR LENS IMPLANT ;  Surgeon: Tyra Diaz MD;  Location: [facility]     PHACOEMULSIFICATION CLEAR CORNEA WITH STANDARD INTRAOCULAR LENS IMPLANT Right 6/30/2017    Procedure: PHACOEMULSIFICATION CLEAR CORNEA WITH STANDARD INTRAOCULAR LENS IMPLANT;  RIGHT EYE PHACOEMULSIFICATION CLEAR CORNEA WITH STANDARD INTRAOCULAR LENS IMPLANT;  Surgeon: Tyra Diaz MD;  Location:  EC     RELEASE TRIGGER FINGER  10/11/2012    Left thumb. Procedure: RELEASE TRIGGER FINGER;  LEFT THUMB TRIGGER RELEASE;  Surgeon: Tay Langley MD;  Location:  SD     RELEASE TRIGGER FINGER Right 12/26/2016    Procedure: RELEASE TRIGGER FINGER;  Surgeon: Albino Castañeda MD;  Location:  OR       Social History   Substance Use Topics     Smoking status: Never Smoker     Smokeless tobacco: Never Used     Alcohol use No      Comment: last month     Family History   Problem Relation Age of Onset     Cancer Mother      BLADDER     Respiratory Mother      COPD     GASTROINTESTINAL DISEASE Mother      CIRRHOSIS OF LI BOLIVAR     Alcohol/Drug Mother       Diabetes Mother      Hypertension Mother      Lipids Mother      C.A.D. Mother      Glaucoma Mother      Alcohol/Drug Sister      Mental Illness Sister      Alcohol/Drug Sister      Psychotic Disorder Sister      Cancer Maternal Grandmother      UNKNOWN TYPE     Cancer Brother      COLON     Cancer - colorectal Brother      IN HIS LATE 30S     Alcohol/Drug Brother       OF HEROIN OVERDOSE AT AGE 22 YRS     Macular Degeneration No family hx of            Current Outpatient Prescriptions   Medication Sig Dispense Refill     acetaminophen (TYLENOL) 500 MG tablet Take 2 tablets (1,000 mg) by mouth 3 times daily as needed for mild pain 100 tablet 3     ACETAMINOPHEN PO Take 1,000 mg by mouth every 6 hours as needed for pain or fever       albuterol (PROAIR HFA/PROVENTIL HFA/VENTOLIN HFA) 108 (90 Base) MCG/ACT Inhaler Inhale 2 puffs into the lungs every 6 hours as needed for shortness of breath / dyspnea or wheezing 1 Inhaler 0     aspirin 81 MG EC tablet TAKE 1 TABLET (81MG) BY MOUTH DAILY 28 tablet 3     benztropine (COGENTIN) 0.5 MG tablet Take 1 tablet (0.5 mg) by mouth 2 times daily 60 tablet 1     blood glucose monitoring (ONE TOUCH DELICA) lancets Use to test blood sugar 2 times daily or as directed.  Ok to substitute alternative if insurance prefers. 150 each 11     blood glucose monitoring (ONETOUCH VERIO IQ) test strip Use to test blood sugar 4 times daily .  Ok to substitute alternative if insurance prefers. 200 strip 11     calcium carbonate (OS-ALLEN 600 MG [place]. CA) 1500 (600 CA) MG tablet Take 1 tablet (1,500 mg) by mouth daily 180 tablet 3     clotrimazole (LOTRIMIN) 1 % cream Apply topically 2 times daily       DiphenhydrAMINE HCl (DIPHENDRYL PO) Take 25 mg by mouth every 6 hours as needed for itching       dulaglutide (TRULICITY) 1.5 MG/0.5ML pen Inject 1.5 mg Subcutaneous every 7 days 2 mL 3     gabapentin (NEURONTIN) 300 MG capsule TAKE 2 CAPSULES (600MG) BY MOUTH THREE TIMES A  capsule 1      glucose 4 G CHEW chewable tablet Take 2 every 15 minutes for blood sugar <70mg/dL. Recheck blood sugar every 15 minutes until above 70mg/dL, then eat a substantial meal. 20 tablet 1     guaiFENesin-codeine (ROBITUSSIN AC) 100-10 MG/5ML SOLN solution Take 5 mLs by mouth every 4 hours as needed for cough 120 mL 0     ibuprofen (ADVIL,MOTRIN) 600 MG tablet Take 1 tablet (600 mg) by mouth every 4 hours as needed for moderate pain 60 tablet 0     insulin aspart (NOVOLOG FLEXPEN) 100 UNIT/ML injection Inject 20 Units Subcutaneous 3 times daily (with meals) Once daily, can add additional 5 units if BGs are >500mg/dL. 15 mL 11     insulin glargine (LANTUS) 100 UNIT/ML injection Inject 48 Units Subcutaneous At Bedtime 3 mL 11     insulin pen needle (NOVOFINE 30) 30G X 8 MM USE 4 DAILY OR AS DIRECTED 400 each 0     ipratropium - albuterol 0.5 mg/2.5 mg/3 mL (DUONEB) 0.5-2.5 (3) MG/3ML neb solution Take 1 vial (3 mLs) by nebulization every 6 hours as needed for shortness of breath / dyspnea or wheezing 30 vial 0     losartan (COZAAR) 100 MG tablet TAKE 1 TABLET (100MG) BY MOUTH DAILY 28 tablet 3     melatonin 5 MG CAPS Take 2 capsules by mouth daily At dinnertime 180 capsule 3     metoprolol succinate (TOPROL-XL) 25 MG 24 hr tablet Take 1 tablet (25 mg) by mouth daily 30 tablet 1     order for DME Equipment being ordered: Nebulizer Machine 1 Units 0     order for DME Equipment being ordered: 4 Wheeled walker with seat and brakes. 1 each 0     order for DME Diabetic Shoes 2 each 0     order for DME Hold Novolog if meals are refused. 1 each 0     paliperidone (INVEGA) 9 MG 24 hr tablet Take 1 tablet (9 mg) by mouth every morning 30 tablet 3     polyethylene glycol (MIRALAX/GLYCOLAX) powder TAKE 8.5 GRAMS (1/2 CAPFUL) BY MOUTH DAILY 527 g 3     prochlorperazine (COMPAZINE) 5 MG tablet Take 1 tablet (5 mg) by mouth every 6 hours as needed for nausea or vomiting 90 tablet 0     ranitidine (ZANTAC) 300 MG tablet TAKE 1 TABLET  (300MG) BY MOUTH AT BEDTIME 90 tablet 1     senna-docusate (SENOKOT-S;PERICOLACE) 8.6-50 MG per tablet Take 1 tablet by mouth 2 times daily as needed for constipation 100 tablet 1     sertraline (ZOLOFT) 100 MG tablet Take 2 tablets (200 mg) by mouth daily 60 tablet 1     SUMAtriptan (IMITREX) 25 MG tablet Take 1-2 tablets (25-50 mg) by mouth at onset of headache for migraine May repeat in 2 hours. Max 8 tablets/24 hours. 12 tablet 1     topiramate (TOPAMAX) 25 MG tablet 25mg at bedtime for week 1, 50mg at bedtime for 1 week, and 75mg at bedtime thereafter 90 tablet 3     vitamin D3 (CHOLECALCIFEROL) 61637 UNITS capsule TAKE 1 CAPSULE (50,000 UNITS) MONTHLY 12 capsule 1     Allergies   Allergen Reactions     Imidazole Antifungals Hives     Tolerates diflucan     Ketoprofen Itching     Pruritis to topical     Lisinopril Hives     Metformin Other (See Comments)     Patient hospitalized for lactic acidosis - admitting provider suspectd caused by metformin     Metronidazole Hives     Posaconazole Hives     Tolerates diflucan     Recent Labs   Lab Test  06/29/18   1333  05/28/18   1625  05/22/18   1434  02/12/18   1408  02/08/18   2155   01/13/18   2315  12/09/17   0748   11/07/17   0801   06/27/17   1358   12/29/16   0909   07/13/16   1350   07/14/15   1215   09/03/13   1156   A1C   --    --   6.2*  6.8*   --    --    --   8.9*   --   8.9*   < >  7.0*   < >   --    < >   --    < >  9.1*   < >  10.8*   LDL   --    --    --    --    --    --    --    --    --    --    --   64   --    --    --   137*   --   78   < >  90   HDL   --    --    --    --    --    --    --    --    --    --    --   37*   --    --    --    --    --   39*   --   37*   TRIG   --    --    --    --    --    --    --    --    --    --    --   267*   --    --    --    --    --   151*   --   171*   ALT   --   27   --    --   23   --   29   --    --   23   < >  32   < >  26   < >   --    < >   --    < >  38   CR  1.06*  1.16*   --    --   1.09*   < >   1.62*   --    < >  0.88   < >  0.78   < >  0.72   < >   --    < >  0.67   < >  0.54   GFRESTIMATED  53*  48*   --    --   52*   < >  33*   --    < >  66   < >  76   < >  83   < >   --    < >  >90  Non  GFR Calc     < >  >90   GFRESTBLACK  65  58*   --    --   63   < >  40*   --    < >  80   < >  >90   GFR Calc     < >  >90   GFR Calc     < >   --    < >  >90   GFR Calc     < >  >90   POTASSIUM  4.7  4.5   --    --   4.3   < >  4.0   --    < >  3.9   < >  4.3   < >  4.2   < >   --    < >  4.3   < >  4.4   TSH   --    --    --    --    --    --    --    --    --   3.21   --    --    --   2.24   < >   --    < >   --    < >  1.42    < > = values in this interval not displayed.      BP Readings from Last 3 Encounters:   07/16/18 92/58   06/29/18 106/66   06/29/18 128/58    Wt Readings from Last 3 Encounters:   07/16/18 235 lb (106.6 kg)   06/29/18 232 lb (105.2 kg)   06/22/18 236 lb (107 kg)        Labs reviewed in EPIC  Problem list, Medication list, Allergies, and Medical/Social/Surgical histories reviewed in [facility] and updated as appropriate.     ROS: Constitutional, neuro, ENT, endocrine, pulmonary, cardiac, gastrointestinal, genitourinary, musculoskeletal, integument and psychiatric systems are negative, except as otherwise noted above in the HPI.     OBJECTIVE:                                                    /64 (BP Location: Left arm)  Pulse 82  Temp 98.1  F (36.7  C) (Oral)  Resp 18  Wt 238 lb (108 kg)  LMP 01/06/2015  SpO2 95%  BMI 45.52 kg/m2  Body mass index is 45.52 kg/(m^2).  GENERAL: alert, no distress  EYES: Eyes grossly normal to inspection, extraocular movements - intact  RESP: lungs clear to auscultation - no rales, no rhonchi, no wheezes  CV: regular rates and rhythm, normal S1 S2, no S3 or S4 and no murmur, no click or rub  MS: extremities- no gross deformities noted, no edema  NEURO: strength and tone- normal, sensory exam-  grossly normal, mentation- intact, speech- normal, Non focal no aphasia. No facial asymmetry.   Mental Status Assessment:  Appearance:   Appropriate   Eye Contact:   Good   Psychomotor Behavior: Normal   Attitude:   Cooperative   Orientation:   All  Speech   Rate / Production: Normal    Volume:  Normal   Mood:    Normal  Affect:    Appropriate   Thought Content:  Clear   Thought Form:  Coherent  Logical   Insight:    Fair   Attention Span and Concentration: appropriate  Recent and Remote Memory:  intact  Fund of Knowledge: appropriate  Muscle Strength and Tone: normal   Suicidal Ideation: reports thoughts, no intention  Hallucination: Yes    See [facility] notes     Diagnostic Test Results:  Pending.      ASSESSMENT/PLAN:                                                    (E11.22,  N18.3,  Z79.4) Type 2 diabetes mellitus with stage 3 chronic kidney disease, with long-term current use of insulin (H)  (primary encounter diagnosis)  Comment: noted above.   Plan: HEMOGLOBIN A1C, Lipid panel reflex to direct         LDL Fasting, Albumin Random Urine Quantitative         with Creat Ratio        Will discuss results with patient at the next visit.     (Z12.31) Visit for screening mammogram  Comment: Routine screening.   Plan: MA SCREENING DIGITAL BILAT - Future  (s+30)        Will call patient with an appointment for mammogram.     (E66.01) overweight - BMI >35  Comment: Discussed making healthy food choices and increasing activity to help with weight reduction.   Body mass index is 45.52 kg/(m^2).  Wt Readings from Last 4 Encounters:   08/06/18 238 lb (108 kg)   07/16/18 235 lb (106.6 kg)   06/29/18 232 lb (105.2 kg)   06/22/18 236 lb (107 kg)     Plan: Continue with plan of care as discussed.       Patient Instructions   -Will call patient with appointment for mammogram.     -Call clinic with any questions or concerns.       I spent 25 min spent in direct face to face time with Nena Tang, greater than 50% in counseling  and coordination of care for:  Diabetes, Weight Management and routine screening.     ART Fay [facility] PRIMARY CARE

## 2018-08-06 NOTE — PROGRESS NOTES
[facility]   August 6, 2018      Behavioral Health Clinician Progress Note    Patient Name: Nena Tang           Service Type:  Individual      Service Location:   Face to Face in Clinic     Session Start Time: 11:am  Session End Time: 11: 45am      Session Length: 38 - 52      Attendees: Patient    Visit Activities (Refresh list every visit): [facility] [employer]    Diagnostic Assessment Date: 1-23-18  Treatment Plan Review Date: Not completed yet  See Flowsheets for today's PHQ-9 and TAMIA-7 results  Previous PHQ-9:   PHQ-9 SCORE 1/2/2017 2/3/2017 3/13/2018   Total Score - - -   Total Score - - -   Total Score 0 0 14     Previous TAMIA-7:   TAMIA-7 SCORE 1/2/2017 2/3/2017 3/13/2018   Total Score - - -   Total Score 0 0 17   Total Score - - -       ALEXANDRA LEVEL:  ALEXANDRA Score (Last Two) 8/30/2011 6/9/2014   ALEXANDRA Raw Score 42 37   Activation Score 66 49.9   ALEXANDRA Level 3 2       DATA  Extended Session (60+ minutes): No  Interactive Complexity: No  Crisis: No  [facility] Patient: No    Treatment Objective(s) Addressed in This Session:  Target Behavior(s): disease management/lifestyle changes mental health    Depressed Mood: Increase interest, engagement, and pleasure in doing things  Decrease frequency and intensity of feeling down, depressed, hopeless  Improve quantity and quality of night time sleep / decrease daytime naps  Feel less tired and more energy during the day   Identify negative self-talk and behaviors: challenge core beliefs, myths, and actions  Improve concentration, focus, and mindfulness in daily activities   Decrease thoughts that you'd be better off dead or of suicide / self-harm  Anxiety: will experience a reduction in anxiety, will develop more effective coping skills to manage anxiety symptoms, will develop healthy cognitive patterns and beliefs and will increase ability to function adaptively  Alcohol / Substance Use: continue to make healthy choices regarding substance use and engage  "in activities / supportive services that promote sobriety  Thought Disturbance: will develop skills to more effectively manage symptoms, will develop more effective support systems and will continue to take medications as prescribed    Current Stressors / Issues:    The patient was seen by [facility] per the request of the PCP-she stated that she got the letter stating that she is not able to move into new apartment-she stated that she is not feeling suicidal she was sad but she does not want to harm the self she is going to leave it in God's hand-she was able to talk about things she is grateful for regarding having a place to live and the opportunity to have independent housing if she chooses to move forward-she is able to reapply appeal the decision-no suicidal thoughts today-she stated that she is having a good day today-she has been socializing with some friends from the [facility] in [place] about their mental health experiences and this is helpful for the patient and for the other friends-she is able to socialize with others that are able to understand and relate with her mental health experiences-she stated that she is feeling a little suicidal just a little-she is planing to spend time with her son and grandchildren out of town-she stated that she is going have some fun with her family members-has plan to harm self-no intent and no desire-she stated that she wants to go have fun with her grandchildren and then she wants to go to the hospital for mental health stabilization-she has not been using the CPAP machine due to fear of \"the man\" sleeping has been very difficult-she is going to try to use the CPAP with the light on-worrying a lot and has panic attacks    I want to alert your workers and your son of your thoughts    [place] [phone] house: talked with worker about suicidal concern and the worker stated she would alert her other staff and that she has the number for crisis line      Pedro ([place]) ([phone]) " [phone]: left message about suicidal concern and directions of how to assess and how to respond    SIVA Hammond    Son: Hal [phone]:eft message about suicidal concern and directions of how to assess and how to respond         The patient gave this writer verbal permission to inform the people listed above of her suicidal thoughts so they can be supportive of her.     Progress on Treatment Objective(s) / Homework:  No improvement - PREPARATION (Decided to change - considering how); Intervened by negotiating a change plan and determining options / strategies for behavior change, identifying triggers, exploring social supports, and working towards setting a date to begin behavior change    Motivational Interviewing    MI Intervention: Expressed Empathy/Understanding, Supported Autonomy, Collaboration, Evocation, Permission to raise concern or advise, Open-ended questions, Reflections: simple and complex, Rolled with resistance: Emphasized patient autonomy, Simple reflection and Evoked patient agenda and Change talk (evoked)     Change Talk Expressed by the Patient: Desire to change Reasons to change Need to change Committment to change Activation Taking steps    Provider Response to Change Talk: E - Evoked more info from patient about behavior change, A - Affirmed patient's thoughts, decisions, or attempts at behavior change, R - Reflected patient's change talk and S - Summarized patient's change talk statements      Care Plan review completed: No    Medication Review:  No changes to current psychiatric medication(s)   Medication Compliance:  NA    Changes in Health Issues:   None reported     Chemical Use Review:   Substance Use: Chemical use reviewed, no active concerns identified      Tobacco Use: No current tobacco use.      Assessment: Current Emotional / Mental Status (status of significant symptoms):  Risk status (Self / Other harm or suicidal ideation)  Patient has had a history of suicidal  ideation: yes  Patient denies current fears or concerns for personal safety.  Patient reports the following current or recent suicidal ideation or behaviors: no intent to harm the self at this time.  Patient denies current or recent homicidal ideation or behaviors.  Patient denies current or recent self injurious behavior or ideation.  Patient denies other safety concerns.  A safety and risk management plan has not been developed at this time, however patient was encouraged to call [place] [phone] should there be a change in any of these risk factors.    Appearance:   Appropriate   Eye Contact:   Good   Psychomotor Behavior: Normal   Attitude:   Cooperative   Orientation:   All  Speech   Rate / Production: Normal    Volume:  Normal   Mood:    Normal  Affect:    Appropriate  Bright  Worrisome   Thought Content:  Clear   Thought Form:  Coherent   Insight:    Fair     Diagnoses:  1. Schizoaffective disorder, depressive type (H)    2. Posttraumatic stress disorder        Collateral Reports Completed:  Not Applicable    Plan: (Homework, other):  Patient was given information about behavioral services and encouraged to schedule a follow up appointment with the clinic [facility] as needed.  She was also given information about mental health symptoms and treatment options .  CD Recommendations: Maintain Sobriety.    BIN George, [facility]      ______________________________________________________________________

## 2018-08-06 NOTE — MR AVS SNAPSHOT
After Visit Summary   8/6/2018    Nena Tang    MRN: [id number]           Patient Information     Date Of Birth          1960        Visit Information        Provider Department      8/6/2018 11:00 AM Richardson Lopez [facility]        Today's Diagnoses     Schizoaffective disorder, depressive type (H)    -  1    Posttraumatic stress disorder           Follow-ups after your visit        Your next 10 appointments already scheduled     Aug 28, 2018  1:30 PM CDT   Return Visit with Richardson Lopez [facility] ([facility])    [address]  [address]  [facility] [address]   [phone]            Aug 30, 2018  3:30 PM CDT   RETURN RETINA with Tiburcio Chacon MD   [facility] Clinic ([facility])    [address]  [facility] [address]   [phone]            Sep 06, 2018 11:00 AM CDT   Return Visit with ART Arias [facility] ([facility])    [address]  [address]  [facility] [address]   [phone]            Sep 06, 2018 11:00 AM CDT   Return Visit with Richardson Lopez [facility] ([facility])    [address]  [address]  [facility] [address]   [phone]            Sep 07, 2018  3:00 PM CDT   Return Visit with Kraig Pedersen MD   [facility] ([facility])    [address]  [facility] [address]   [phone]            Sep 10, 2018 10:45 AM CDT   (Arrive by 10:30 AM)   Return Weight Management Visit with Debbie Germain PA-C   [facility] ([facility])    [address]  [address] [facility] [address]    [phone]              Future tests that were ordered for you today     Open Future Orders        Priority Expected Expires Ordered    MA SCREENING DIGITAL BILAT - Future  (s+30) Routine  8/6/2019 8/6/2018            Who to contact     If you have questions or need follow up information about today's clinic visit or your schedule please contact [facility] directly at [phone].  Normal or non-critical lab and imaging results will be communicated to you by Highfivehart, letter or phone within 4 business days after the clinic has received the results. If you do not hear from us within 7 days, please contact the clinic through Highfivehart or phone. If you have a critical or abnormal lab result, we will notify you by phone as soon as possible.  Submit refill requests through [employer] Genius or call your pharmacy and they will forward the refill request to us. Please allow 3 business days for your refill to be completed.          Additional Information About Your Visit        Highfivehart Information     [employer] gives you secure access to your electronic health record. If you see a primary care provider, you can also send messages to your care team and make appointments. If you have questions, please call your primary care clinic.  If you do not have a primary care provider, please call [phone] and they will assist you.        Care EveryWhere ID     This is your Care EveryWhere ID. This could be used by other organizations to access your [place] medical records  [id number]        Your Vitals Were     Last Period                   01/06/2015            Blood Pressure from Last 3 Encounters:   08/06/18 122/64   07/16/18 92/58   06/29/18 106/66    Weight from Last 3 Encounters:   08/06/18 238 lb (108 kg)   07/16/18 235 lb (106.6 kg)   06/29/18 232 lb (105.2 kg)              Today, you had the following     No orders found for display       Primary Care Provider Office Phone # Fax #    Rowena TUCKER  [facility] [phone]       [address] [facility] [address]  [facility] [address]        Goals        General    Medical (pt-stated)     Notes - Note created  7/7/2016  9:15 AM by Chelsea Pulido, RN    Get blood sugars under control    Improve medication compliance    As of today's date 7/7/2016 goal is met at 0 - 25%.   Goal Status:  Active          Equal Access to Services     [facility] : Hadii aad ku hadasho Soomaali, waaxda luqadaha, qaybta kaalmada adeegyada, waxay idiin hayaan adeeg kharash la'aan . So [facility] [phone].    ATENCIÓN: Si habla español, tiene a trinh disposición servicios gratuitos de asistencia lingüística. Llame al [phone].    We comply with applicable federal civil rights laws and [place] laws. We do not discriminate on the basis of race, color, national origin, age, disability, sex, sexual orientation, or gender identity.            Thank you!     Thank you for choosing [facility]  for your care. Our goal is always to provide you with excellent care. Hearing back from our patients is one way we can continue to improve our services. Please take a few minutes to complete the written survey that you may receive in the mail after your visit with us. Thank you!             Your Updated Medication List - Protect others around you: Learn how to safely use, store and throw away your medicines at www.disposemymeds.org.          This list is accurate as of 8/6/18  3:32 PM.  Always use your most recent med list.                   Brand Name Dispense Instructions for use Diagnosis    * ACETAMINOPHEN PO      Take 1,000 mg by mouth every 6 hours as needed for pain or fever        * acetaminophen 500 MG tablet    TYLENOL    100 tablet    Take 2 tablets (1,000 mg) by mouth 3 times daily as needed for mild pain    Chronic low back pain, unspecified back pain laterality, with sciatica presence unspecified       albuterol 108 (90 Base) MCG/ACT Inhaler     PROAIR HFA/PROVENTIL HFA/VENTOLIN HFA    1 Inhaler    Inhale 2 puffs into the lungs every 6 hours as needed for shortness of breath / dyspnea or wheezing    Pneumonia, organism unspecified(486)       aspirin 81 MG EC tablet     28 tablet    TAKE 1 TABLET (81MG) BY MOUTH DAILY    Coronary artery disease involving native heart with angina pectoris, unspecified vessel or lesion type (H)       benztropine 0.5 MG tablet    COGENTIN    60 tablet    Take 1 tablet (0.5 mg) by mouth 2 times daily    Extrapyramidal and movement disorder       blood glucose monitoring lancets     150 each    Use to test blood sugar 2 times daily or as directed.  Ok to substitute alternative if insurance prefers.    Type 2 diabetes mellitus with diabetic neuropathy, with long-term current use of insulin (H)       * [employer] TEST STRIPS test strip   Generic drug:  blood glucose monitoring           * blood glucose monitoring test strip    ONETOUCH VERIO IQ    200 strip    Use to test blood sugar 4 times daily .  Ok to substitute alternative if insurance prefers.    Type 2 diabetes mellitus with diabetic neuropathy, with long-term current use of insulin (H)       calcium carbonate 1500 (600 Ca) MG tablet    OS-ALLEN 600 mg [place]. Ca    180 tablet    Take 1 tablet (1,500 mg) by mouth daily    Other osteoporosis without current pathological fracture       clotrimazole 1 % cream    LOTRIMIN     Apply topically 2 times daily        DIPHENDRYL PO      Take 25 mg by mouth every 6 hours as needed for itching        dulaglutide 1.5 MG/0.5ML pen    TRULICITY    2 mL    Inject 1.5 mg Subcutaneous every 7 days    Type 2 diabetes mellitus with mild nonproliferative retinopathy without macular edema, with long-term current use of insulin, unspecified laterality (H)       gabapentin 300 MG capsule    NEURONTIN    168 capsule    TAKE 2 CAPSULES (600MG) BY MOUTH THREE TIMES A DAY    Type 2 diabetes mellitus with diabetic neuropathy, with long-term current use of  insulin (H)       glucose 4 g Chew chewable tablet     20 tablet    Take 2 every 15 minutes for blood sugar <70mg/dL. Recheck blood sugar every 15 minutes until above 70mg/dL, then eat a substantial meal.    Type 2 diabetes mellitus with mild nonproliferative retinopathy without macular edema, with long-term current use of insulin, unspecified laterality (H)       guaiFENesin-codeine 100-10 MG/5ML Soln solution    ROBITUSSIN AC    120 mL    Take 5 mLs by mouth every 4 hours as needed for cough    Cough       ibuprofen 600 MG tablet    ADVIL/MOTRIN    60 tablet    Take 1 tablet (600 mg) by mouth every 4 hours as needed for moderate pain    Closed fracture of one rib of right side, initial encounter       insulin aspart 100 UNIT/ML injection    NovoLOG FLEXPEN    15 mL    Inject 20 Units Subcutaneous 3 times daily (with meals) Once daily, can add additional 5 units if BGs are >500mg/dL.    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, with long-term current use of insulin, unspecified laterality (H)       insulin pen needle 30G X 8 MM    NOVOFINE 30    400 each    USE 4 DAILY OR AS DIRECTED    Type 2 diabetes mellitus with mild nonproliferative retinopathy without macular edema, with long-term current use of insulin, unspecified laterality (H)       ipratropium - albuterol 0.5 mg/2.5 mg/3 mL 0.5-2.5 (3) MG/3ML neb solution    DUONEB    30 vial    Take 1 vial (3 mLs) by nebulization every 6 hours as needed for shortness of breath / dyspnea or wheezing    Pneumonia due to infectious organism, unspecified laterality, unspecified part of lung, Wheezing       losartan 100 MG tablet    COZAAR    28 tablet    TAKE 1 TABLET  (100MG) BY MOUTH DAILY    Coronary artery disease involving native heart with angina pectoris, unspecified vessel or lesion type (H)       melatonin 5 MG Caps     180 capsule    Take 2 capsules by mouth daily At dinnertime    Insomnia, unspecified type       metoprolol succinate 25 MG 24 hr tablet    TOPROL-XL    30 tablet    Take 1 tablet (25 mg) by mouth daily    Coronary artery disease involving native heart with angina pectoris, unspecified vessel or lesion type (H)       * order for DME     1 each    Hold Novolog if meals are refused.    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    Generalized muscle weakness       order for DME     1 Units    Equipment being ordered: Nebulizer Machine    Wheezing, Cough, Pneumonia due to infectious organism, unspecified laterality, unspecified part of lung       paliperidone 9 MG 24 hr tablet    INVEGA    30 tablet    Take 1 tablet (9 mg) by mouth every morning    Schizoaffective disorder, bipolar type (H)       polyethylene glycol powder    MIRALAX/GLYCOLAX    527 g    TAKE 8.5 GRAMS (1/2 CAPFUL) BY MOUTH DAILY    Constipation, unspecified constipation type       prochlorperazine 5 MG tablet    COMPAZINE    90 tablet    Take 1 tablet (5 mg) by mouth every 6 hours as needed for nausea or vomiting    Nausea       ranitidine 300 MG tablet    ZANTAC    90 tablet    TAKE 1 TABLET (300MG) BY MOUTH AT BEDTIME    Gastroesophageal reflux disease without esophagitis       senna-docusate 8.6-50 MG per tablet    SENOKOT-S;PERICOLACE    100 tablet    Take 1 tablet by mouth 2 times  daily as needed for constipation    Drug-induced constipation       sertraline 100 MG tablet    ZOLOFT    60 tablet    Take 2 tablets (200 mg) by mouth daily    Schizoaffective disorder, bipolar type (H)       SUMAtriptan 25 MG tablet    IMITREX    12 tablet    Take 1-2 tablets (25-50 mg) by mouth at onset of headache for migraine May repeat in 2 hours. Max 8 tablets/24 hours.    Migraine with aura and without status migrainosus, not intractable       topiramate 25 MG tablet    TOPAMAX    90 tablet    25mg at bedtime for week 1, 50mg at bedtime for 1 week, and 75mg at bedtime thereafter    Morbid obesity (H)       vitamin D3 44202 UNITS capsule    CHOLECALCIFEROL    12 capsule    TAKE 1 CAPSULE (50,000 UNITS) MONTHLY    Vitamin D deficiency       * Notice:  This list has 6 medication(s) that are the same as other medications prescribed for you. Read the directions carefully, and ask your doctor or other care provider to review them with you.

## 2018-08-06 NOTE — MR AVS SNAPSHOT
After Visit Summary   8/6/2018    Nena Tang    MRN: [id number]           Patient Information     Date Of Birth          1960        Visit Information        Provider Department      8/6/2018 11:00 AM Rowena Haas APRN CNP [facility]        Today's Diagnoses     Type 2 diabetes mellitus with stage 3 chronic kidney disease, with long-term current use of insulin (H)    -  1    Visit for screening mammogram        overweight - BMI >35          Care Instructions    -Will call patient with appointment for mammogram.     -Call clinic with any questions or concerns.               Follow-ups after your visit        Follow-up notes from your care team     Return in about 4 weeks (around 9/3/2018) for Mental Health, Diabetes.      Your next 10 appointments already scheduled     Aug 30, 2018  3:30 PM CDT   RETURN RETINA with Tiburcio Chacon MD   [facility] ([facility])    [address]  [address] [address]  [facility] [address]   [phone]            Sep 07, 2018  3:00 PM CDT   Return Visit with Kraig Pedersen [place]   [facility] [place]    [address]  [facility] [address]   [phone]            Sep 10, 2018 10:45 AM CDT   (Arrive by 10:30 AM)   Return Weight Management Visit with Debbie Germain PA-C   [facility] ([facility])    [address]  4th Floor  [facility] [address]   [phone]              Future tests that were ordered for you today     Open Future Orders        Priority Expected Expires Ordered    MA SCREENING DIGITAL BILAT - Future  (s+30) Routine  8/6/2019 8/6/2018            Who to contact     If you have questions or need follow up information about today's clinic visit or your schedule please contact [facility] directly  at [phone].  Normal or non-critical lab and imaging results will be communicated to you by MyChart, letter or phone within 4 business days after the clinic has received the results. If you do not hear from us within 7 days, please contact the clinic through [employer] or phone. If you have a critical or abnormal lab result, we will notify you by phone as soon as possible.  Submit refill requests through [facility] or call your pharmacy and they will forward the refill request to us. Please allow 3 business days for your refill to be completed.          Additional Information About Your Visit        [employer] Information     [facility] gives you secure access to your electronic health record. If you see a primary care provider, you can also send messages to your care team and make appointments. If you have questions, please call your primary care clinic.  If you do not have a primary care provider, please call [phone] and they will assist you.        Care EveryWhere ID     This is your Care EveryWhere ID. This could be used by other organizations to access your [facility] medical records  [id number]        Your Vitals Were     Pulse Temperature Respirations Last Period Pulse Oximetry BMI (Body Mass Index)    82 98.1  F (36.7  C) (Oral) 18 01/06/2015 95% 45.52 kg/m2       Blood Pressure from Last 3 Encounters:   08/06/18 122/64   07/16/18 92/58   06/29/18 106/66    Weight from Last 3 Encounters:   08/06/18 238 lb (108 kg)   07/16/18 235 lb (106.6 kg)   06/29/18 232 lb (105.2 kg)              We Performed the Following     Albumin Random Urine Quantitative with Creat Ratio     HEMOGLOBIN A1C     Lipid panel reflex to direct LDL Fasting        Primary Care Provider Office Phone # Fax #    ART Arias [phone]       [address] [place] [address]  [facility] [id number]        Goals        General    Medical (pt-stated)     Notes - Note created  7/7/2016  9:15 AM by Chelsea Pulido RN    Get  blood sugars under control    Improve medication compliance    As of today's date 7/7/2016 goal is met at 0 - 25%.   Goal Status:  Active          Equal Access to Services     RAMESH GARRIDO : Azul aad ku hadso Blanca, luis davidarturoha, devante joselocastillo catalan, lorena marroquin limonica johnson laprudencefredi martins. So [facility] [phone].    ATENCIÓN: Si habla español, tiene a trinh disposición servicios gratuitos de asistencia lingüística. Llame al [phone].    We comply with applicable federal civil rights laws and [place] laws. We do not discriminate on the basis of race, color, national origin, age, disability, sex, sexual orientation, or gender identity.            Thank you!     Thank you for choosing [facility] PRIMARY [facility]ARE  for your care. Our goal is always to provide you with excellent care. Hearing back from our patients is one way we can continue to improve our services. Please take a few minutes to complete the written survey that you may receive in the mail after your visit with us. Thank you!             Your Updated Medication List - Protect others around you: Learn how to safely use, store and throw away your medicines at www.disposemymeds.org.          This list is accurate as of 8/6/18 11:41 AM.  Always use your most recent med list.                   Brand Name Dispense Instructions for use Diagnosis    * ACETAMINOPHEN PO      Take 1,000 mg by mouth every 6 hours as needed for pain or fever        * acetaminophen 500 MG tablet    TYLENOL    100 tablet    Take 2 tablets (1,000 mg) by mouth 3 times daily as needed for mild pain    Chronic low back pain, unspecified back pain laterality, with sciatica presence unspecified       albuterol 108 (90 Base) MCG/ACT Inhaler    PROAIR HFA/PROVENTIL HFA/VENTOLIN HFA    1 Inhaler    Inhale 2 puffs into the lungs every 6 hours as needed for shortness of breath / dyspnea or wheezing    Pneumonia, organism unspecified(486)       aspirin 81 MG EC tablet      28 tablet    TAKE 1 TABLET (81MG) BY MOUTH DAILY    Coronary artery disease involving native heart with angina pectoris, unspecified vessel or lesion type (H)       benztropine 0.5 MG tablet    COGENTIN    60 tablet    Take 1 tablet (0.5 mg) by mouth 2 times daily    Extrapyramidal and movement disorder       blood glucose monitoring lancets     150 each    Use to test blood sugar 2 times daily or as directed.  Ok to substitute alternative if insurance prefers.    Type 2 diabetes mellitus with diabetic neuropathy, with long-term current use of insulin (H)       * [employer] TEST STRIPS test strip   Generic drug:  blood glucose monitoring           * blood glucose monitoring test strip    ONETOUCH VERIO IQ    200 strip    Use to test blood sugar 4 times daily .  Ok to substitute alternative if insurance prefers.    Type 2 diabetes mellitus with diabetic neuropathy, with long-term current use of insulin (H)       calcium carbonate 1500 (600 Ca) MG tablet    OS-ALLEN 600 mg [place]. Ca    180 tablet    Take 1 tablet (1,500 mg) by mouth daily    Other osteoporosis without current pathological fracture       clotrimazole 1 % cream    LOTRIMIN     Apply topically 2 times daily        DIPHENDRYL PO      Take 25 mg by mouth every 6 hours as needed for itching        dulaglutide 1.5 MG/0.5ML pen    TRULICITY    2 mL    Inject 1.5 mg Subcutaneous every 7 days    Type 2 diabetes mellitus with mild nonproliferative retinopathy without macular edema, with long-term current use of insulin, unspecified laterality (H)       gabapentin 300 MG capsule    NEURONTIN    168 capsule    TAKE 2 CAPSULES (600MG) BY MOUTH THREE TIMES A DAY    Type 2 diabetes mellitus with diabetic neuropathy, with long-term current use of insulin (H)       glucose 4 g Chew chewable tablet     20 tablet    Take 2 every 15 minutes for blood sugar <70mg/dL. Recheck blood sugar every 15 minutes until above 70mg/dL, then eat a substantial meal.    Type 2 diabetes  mellitus with mild nonproliferative retinopathy without macular edema, with long-term current use of insulin, unspecified laterality (H)       guaiFENesin-codeine 100-10 MG/5ML Soln solution    ROBITUSSIN AC    120 mL    Take 5 mLs by mouth every 4 hours as needed for cough    Cough       ibuprofen 600 MG tablet    ADVIL/MOTRIN    60 tablet    Take 1 tablet (600 mg) by mouth every 4 hours as needed for moderate pain    Closed fracture of one rib of right side, initial encounter       insulin aspart 100 UNIT/ML injection    NovoLOG FLEXPEN    15 mL    Inject 20 Units Subcutaneous 3 times daily (with meals) Once daily, can add additional 5 units if BGs are >500mg/dL.    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, with long-term current use of insulin, unspecified laterality (H)       insulin pen needle 30G X 8 MM    NOVOFINE 30    400 each    USE 4 DAILY OR AS DIRECTED    Type 2 diabetes mellitus with mild nonproliferative retinopathy without macular edema, with long-term current use of insulin, unspecified laterality (H)       ipratropium - albuterol 0.5 mg/2.5 mg/3 mL 0.5-2.5 (3) MG/3ML neb solution    DUONEB    30 vial    Take 1 vial (3 mLs) by nebulization every 6 hours as needed for shortness of breath / dyspnea or wheezing    Pneumonia due to infectious organism, unspecified laterality, unspecified part of lung, Wheezing       losartan 100 MG tablet    COZAAR    28 tablet    TAKE 1 TABLET (100MG) BY MOUTH DAILY    Coronary artery disease involving native heart with angina pectoris, unspecified vessel or lesion type (H)       melatonin 5 MG Caps     180 capsule    Take 2 capsules by mouth daily At dinnertime     Insomnia, unspecified type       metoprolol succinate 25 MG 24 hr tablet    TOPROL-XL    30 tablet    Take 1 tablet (25 mg) by mouth daily    Coronary artery disease involving native heart with angina pectoris, unspecified vessel or lesion type (H)       * order for DME     1 each    Hold Novolog if meals are refused.    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    Generalized muscle weakness       order for DME     1 Units    Equipment being ordered: Nebulizer Machine    Wheezing, Cough, Pneumonia due to infectious organism, unspecified laterality, unspecified part of lung       paliperidone 9 MG 24 hr tablet    INVEGA    30 tablet    Take 1 tablet (9 mg) by mouth every morning    Schizoaffective disorder, bipolar type (H)       polyethylene glycol powder    MIRALAX/GLYCOLAX    527 g    TAKE 8.5 GRAMS (1/2 CAPFUL) BY MOUTH DAILY    Constipation, unspecified constipation type       prochlorperazine 5 MG tablet    COMPAZINE    90 tablet    Take 1 tablet (5 mg) by mouth every 6 hours as needed for nausea or vomiting    Nausea       ranitidine 300 MG tablet    ZANTAC    90 tablet    TAKE 1 TABLET (300MG) BY MOUTH AT BEDTIME    Gastroesophageal reflux disease without esophagitis       senna-docusate 8.6-50 MG per tablet    SENOKOT-S;PERICOLACE    100 tablet    Take 1 tablet by mouth 2 times daily as needed for constipation    Drug-induced constipation       sertraline 100 MG tablet    ZOLOFT    60 tablet    Take 2 tablets (200 mg) by mouth daily    Schizoaffective disorder, bipolar type (H)       SUMAtriptan 25  MG tablet    IMITREX    12 tablet    Take 1-2 tablets (25-50 mg) by mouth at onset of headache for migraine May repeat in 2 hours. Max 8 tablets/24 hours.    Migraine with aura and without status migrainosus, not intractable       topiramate 25 MG tablet    TOPAMAX    90 tablet    25mg at bedtime for week 1, 50mg at bedtime for 1 week, and 75mg at bedtime thereafter    Morbid obesity (H)       vitamin D3 03388 UNITS capsule    CHOLECALCIFEROL    12 capsule    TAKE 1 CAPSULE (50,000 UNITS) MONTHLY    Vitamin D deficiency       * Notice:  This list has 6 medication(s) that are the same as other medications prescribed for you. Read the directions carefully, and ask your doctor or other care provider to review them with you.

## 2018-08-08 ENCOUNTER — TELEPHONE (OUTPATIENT)
Dept: FAMILY MEDICINE | Facility: CLINIC | Age: 58
End: 2018-08-08

## 2018-08-08 DIAGNOSIS — E78.5 HYPERLIPIDEMIA LDL GOAL <100: ICD-10-CM

## 2018-08-08 NOTE — TELEPHONE ENCOUNTER
Fax received from [facility], requesting a refill on Atorvastatin 80 mg. Writer can't find med on pt's med list.    [place] tel: [phone]      Giorgi Case

## 2018-08-09 NOTE — TELEPHONE ENCOUNTER
Call from [place] with group home, pt's going on vacation today her flight leaves today at 10 am. Please refill med ASAP.     [place] tel: [phone]        Giorgi Case

## 2018-08-09 NOTE — TELEPHONE ENCOUNTER
"Discontinued by MA on 6/7/18 with reason \"therapy completed\". Should pt still be taking lipitor?    Thank you!  Rina Norris RN     "

## 2018-08-10 RX ORDER — ATORVASTATIN CALCIUM 80 MG/1
TABLET, FILM COATED ORAL
Qty: 30 TABLET | Refills: 11 | Status: SHIPPED | OUTPATIENT
Start: 2018-08-10 | End: 2019-07-02

## 2018-08-10 NOTE — TELEPHONE ENCOUNTER
Return call to patient - group home caregiver stated patient received emergency supply for her travels - advised one year supply of refills on file    Advised pharmacy had been trying to contact clinic multiple times with no response - writer doesn't see these requests - encouraged 72 hours for processing and if no response they are always welcome to call the clinic to clarify    Closing encounter - no further actions needed at this time    Gaye Umana RN

## 2018-08-22 ENCOUNTER — OFFICE VISIT (OUTPATIENT)
Dept: FAMILY MEDICINE | Facility: CLINIC | Age: 58
End: 2018-08-22
Payer: MEDICARE

## 2018-08-22 ENCOUNTER — OFFICE VISIT (OUTPATIENT)
Dept: PHARMACY | Facility: CLINIC | Age: 58
End: 2018-08-22
Payer: COMMERCIAL

## 2018-08-22 VITALS
BODY MASS INDEX: 45.71 KG/M2 | TEMPERATURE: 98.4 F | OXYGEN SATURATION: 95 % | SYSTOLIC BLOOD PRESSURE: 110 MMHG | HEART RATE: 95 BPM | WEIGHT: 239 LBS | RESPIRATION RATE: 18 BRPM | DIASTOLIC BLOOD PRESSURE: 58 MMHG

## 2018-08-22 DIAGNOSIS — E11.65 TYPE 2 DIABETES MELLITUS WITH HYPERGLYCEMIA, WITH LONG-TERM CURRENT USE OF INSULIN (H): ICD-10-CM

## 2018-08-22 DIAGNOSIS — Z79.4 TYPE 2 DIABETES MELLITUS WITH HYPERGLYCEMIA, WITH LONG-TERM CURRENT USE OF INSULIN (H): ICD-10-CM

## 2018-08-22 DIAGNOSIS — B96.89 ACUTE BACTERIAL BRONCHITIS: Primary | ICD-10-CM

## 2018-08-22 DIAGNOSIS — G47.33 OSA (OBSTRUCTIVE SLEEP APNEA): ICD-10-CM

## 2018-08-22 DIAGNOSIS — J30.2 SEASONAL ALLERGIC RHINITIS, UNSPECIFIED CHRONICITY, UNSPECIFIED TRIGGER: ICD-10-CM

## 2018-08-22 DIAGNOSIS — F25.1 SCHIZOAFFECTIVE DISORDER, DEPRESSIVE TYPE (H): Primary | ICD-10-CM

## 2018-08-22 DIAGNOSIS — R05.9 COUGH: ICD-10-CM

## 2018-08-22 DIAGNOSIS — R06.2 WHEEZING: ICD-10-CM

## 2018-08-22 DIAGNOSIS — J20.8 ACUTE BACTERIAL BRONCHITIS: Primary | ICD-10-CM

## 2018-08-22 LAB
FEF 25/75: NORMAL
FEV-1: NORMAL
FEV1/FVC: NORMAL
FVC: NORMAL

## 2018-08-22 PROCEDURE — 99214 OFFICE O/P EST MOD 30 MIN: CPT | Mod: 25 | Performed by: NURSE PRACTITIONER

## 2018-08-22 PROCEDURE — 99607 MTMS BY PHARM ADDL 15 MIN: CPT | Performed by: PHARMACIST

## 2018-08-22 PROCEDURE — 99606 MTMS BY PHARM EST 15 MIN: CPT | Performed by: PHARMACIST

## 2018-08-22 PROCEDURE — 94640 AIRWAY INHALATION TREATMENT: CPT | Performed by: NURSE PRACTITIONER

## 2018-08-22 RX ORDER — CODEINE PHOSPHATE AND GUAIFENESIN 10; 100 MG/5ML; MG/5ML
1 SOLUTION ORAL EVERY 6 HOURS PRN
Qty: 120 ML | Refills: 0 | Status: SHIPPED | OUTPATIENT
Start: 2018-08-22 | End: 2018-09-06

## 2018-08-22 RX ORDER — IPRATROPIUM BROMIDE AND ALBUTEROL SULFATE 2.5; .5 MG/3ML; MG/3ML
1 SOLUTION RESPIRATORY (INHALATION) EVERY 6 HOURS PRN
Qty: 30 VIAL | Refills: 0 | Status: ON HOLD | OUTPATIENT
Start: 2018-08-22 | End: 2019-12-21

## 2018-08-22 RX ORDER — LEVOFLOXACIN 750 MG/1
750 TABLET, FILM COATED ORAL DAILY
Qty: 7 TABLET | Refills: 0 | Status: SHIPPED | OUTPATIENT
Start: 2018-08-22 | End: 2018-09-06

## 2018-08-22 RX ORDER — FLUTICASONE PROPIONATE 50 MCG
1-2 SPRAY, SUSPENSION (ML) NASAL DAILY
Qty: 1 BOTTLE | Refills: 11 | Status: SHIPPED | OUTPATIENT
Start: 2018-08-22 | End: 2019-03-21

## 2018-08-22 NOTE — MR AVS SNAPSHOT
After Visit Summary   8/22/2018    Nena Tang    MRN: [id number]           Patient Information     Date Of Birth          1960        Visit Information        Provider Department      8/22/2018 3:00 PM Eugenia De Jesus, [facility]        Today's Diagnoses     Schizoaffective disorder, depressive type (H)    -  1    Wheezing        Type 2 diabetes mellitus with hyperglycemia, with long-term current use of insulin (H)        CINDY (obstructive sleep apnea)- mild AHI 10.3          Care Instructions    See AVS from PCP encounter for details           Follow-ups after your visit        Your next 10 appointments already scheduled     Aug 28, 2018  1:30 PM CDT   Return Visit with Richardson Lopez, [facility] ([facility])    [address]  [address]  [facility] [id number] [phone]            Aug 28, 2018  2:15 PM CDT   MA SCREENING DIGITAL BILATERAL with URBCMA1   [facility] ([facility])    [address] [place] [address], [address]  [facility] [id number] [phone]           Do not use any powder, lotion or deodorant under your arms or on your breast. If you do, we will ask you to remove it before your exam.  Wear comfortable, two-piece clothing.  If you have any allergies, tell your care team.  Bring any previous mammograms from other facilities or have them mailed to the breast center.            Aug 30, 2018  3:30 PM CDT   RETURN RETINA with Tiburcio Chacon MD   [facility] ([facility])    [address]  [facility] [address] [phone]            Aug 31, 2018  2:30 PM CDT   Return Visit with Kraig Pedersen MD   [facility] ([facility])    [address]  [facility] [id number] [phone]            Sep 06, 2018  11:00 AM CDT   Return Visit with ART Arias CNP   [facility] ([facility])    [address]  [address]  [facility] [address] [phone]            Sep 06, 2018 11:00 AM CDT   Return Visit with Richardson Lopez, [facility] ([facility])    [address]  [address]  [facility] [address] [phone]            Sep 06, 2018 11:00 AM CDT   SHORT with Eugenia De Jesus, [facility] [place] ([facility])    [address] [place] [address]  [address]  [facility] [id number] [phone]            Sep 10, 2018 10:45 AM CDT   (Arrive by 10:30 AM)   Return Weight Management Visit with Debbie Germain PA-C   [facility] ([facility])    [address] [place] [address]  [address] [facility] [id number] [phone]              Who to contact     If you have questions or need follow up information about today's clinic visit or your schedule please contact [facility] directly at [phone].  Normal or non-critical lab and imaging results will be communicated to you by Smart [employer], letter or phone within 4 business days after the clinic has received the results. If you do not hear from us within 7 days, please contact the clinic through MyChart or phone. If you have a critical or abnormal lab result, we will notify you by phone as soon as possible.  Submit refill requests through BookThatDoc or call your pharmacy and they will forward the refill request to us. Please allow 3 business days for your refill to be completed.          Additional Information About Your Visit        BookThatDoc Information     Book[employer]Doc gives you secure access to your electronic health record. If you see a primary care provider, you can also send messages to your  care team and make appointments. If you have questions, please call your primary care clinic.  If you do not have a primary care provider, please call [phone] and they will assist you.        Care EveryWhere ID     This is your Care EveryWhere ID. This could be used by other organizations to access your [facility] records  [id number]        Your Vitals Were     Last Period                   01/06/2015            Blood Pressure from Last 3 Encounters:   08/22/18 110/58   08/06/18 122/64   07/16/18 92/58    Weight from Last 3 Encounters:   08/22/18 239 lb (108.4 kg)   08/06/18 238 lb (108 kg)   07/16/18 235 lb (106.6 kg)              Today, you had the following     No orders found for display         Today's Medication Changes          These changes are accurate as of 8/22/18 11:59 PM.  If you have any questions, ask your nurse or doctor.               Start taking these medicines.        Dose/Directions    fluticasone 50 MCG/ACT spray   Commonly known as:  FLONASE   Used for:  Seasonal allergic rhinitis, unspecified chronicity, unspecified trigger   Started by:  Adriana Georges APRN CNP        Dose:  1-2 spray   Spray 1-2 sprays into both nostrils daily   Quantity:  1 Bottle   Refills:  11       levofloxacin 750 MG tablet   Commonly known as:  LEVAQUIN   Used for:  Acute bacterial bronchitis   Started by:  Adriana Georges APRN CNP        Dose:  750 mg   Take 1 tablet (750 mg) by mouth daily   Quantity:  7 tablet   Refills:  0         These medicines have changed or have updated prescriptions.        Dose/Directions    guaiFENesin-codeine 100-10 MG/5ML Soln solution   Commonly known as:  ROBITUSSIN AC   This may have changed:  when to take this   Used for:  Cough   Changed by:  Adriana Georges APRN CNP        Dose:  1 tsp.   Take 5 mLs by mouth every 6 hours as needed for cough   Quantity:  120 mL   Refills:  0            Where to get your medicines      These medications were  sent to [facility] [address] - [place], [place] - [address] [facility]  [address], [place] [address]     Phone:  [phone]     fluticasone 50 MCG/ACT spray    ipratropium - albuterol 0.5 mg/2.5 mg/3 mL 0.5-2.5 (3) MG/3ML neb solution    levofloxacin 750 MG tablet         Some of these will need a paper prescription and others can be bought over the counter.  Ask your nurse if you have questions.     Bring a paper prescription for each of these medications     guaiFENesin-codeine 100-10 MG/5ML Soln solution                Primary Care Provider Office Phone # Fax #    Rowena Haas ART [phone]       [address] [facility] [address]  [facility] [place] [facility] [address]        Goals        General    Medical (pt-stated)     Notes - Note created  7/7/2016  9:15 AM by Chelsea Pulido RN    Get blood sugars under control    Improve medication compliance    As of today's date 7/7/2016 goal is met at 0 - 25%.   Goal Status:  Active          Equal Access to Services     [facility] AH: Hadii aad ku hadasho Someliaali, waaxda luqadaha, qaybta kaalmada adeegyada, lorena ellison . So [facility] [phone].    ATENCIÓN: Si habla español, tiene a trinh disposición servicios gratuitos de asistencia lingüística. Fatouame al [phone].    We comply with applicable federal civil rights laws and [place] laws. We do not discriminate on the basis of race, color, national origin, age, disability, sex, sexual orientation, or gender identity.            Thank you!     Thank you for choosing [facility] [place]  for your care. Our goal is always to provide you with excellent care. Hearing back from our patients is one way we can continue to improve our services. Please take a few minutes to complete the written survey that you may receive in the mail after your visit with us. Thank you!             Your Updated Medication List - Protect others around you: Learn how to  safely use, store and throw away your medicines at www.disposemymeds.org.          This list is accurate as of 8/22/18 11:59 PM.  Always use your most recent med list.                   Brand Name Dispense Instructions for use Diagnosis    * ACETAMINOPHEN PO      Take 1,000 mg by mouth every 6 hours as needed for pain or fever        * acetaminophen 500 MG tablet    TYLENOL    100 tablet    Take 2 tablets (1,000 mg) by mouth 3 times daily as needed for mild pain    Chronic low back pain, unspecified back pain laterality, with sciatica presence unspecified       albuterol 108 (90 Base) MCG/ACT inhaler    PROAIR HFA/PROVENTIL HFA/VENTOLIN HFA    1 Inhaler    Inhale 2 puffs into the lungs every 6 hours as needed for shortness of breath / dyspnea or wheezing    Pneumonia, organism unspecified(486)       aspirin 81 MG EC tablet     28 tablet    TAKE 1 TABLET (81MG) BY MOUTH DAILY    Coronary artery disease involving native heart with angina pectoris, unspecified vessel or lesion type (H)       atorvastatin 80 MG tablet    LIPITOR    30 tablet    TAKE 1 TABLET (80MG) BY MOUTH DAILY    Hyperlipidemia LDL goal <100       benztropine 0.5 MG tablet    COGENTIN    60 tablet    Take 1 tablet (0.5 mg) by mouth 2 times daily    Extrapyramidal and movement disorder       blood glucose monitoring lancets     150 each    Use to test blood sugar 2 times daily or as directed.  Ok to substitute alternative if insurance prefers.    Type 2 diabetes mellitus with diabetic neuropathy, with long-term current use of insulin (H)       * [employer] TEST STRIPS test strip   Generic drug:  blood glucose monitoring           * blood glucose monitoring test strip    ONETOUCH VERIO IQ    200 strip    Use to test blood sugar 4 times daily .  Ok to substitute alternative if insurance prefers.    Type 2 diabetes mellitus with diabetic neuropathy, with long-term current use of insulin (H)       calcium carbonate 600 mg {elemental} 1500 (600 Ca) MG tablet     OS-ALLEN    180 tablet    Take 1 tablet (1,500 mg) by mouth daily    Other osteoporosis without current pathological fracture       clotrimazole 1 % cream    LOTRIMIN     Apply topically 2 times daily        DIPHENDRYL PO      Take 25 mg by mouth every 6 hours as needed for itching        dulaglutide 1.5 MG/0.5ML pen    TRULICITY    2 mL    Inject 1.5 mg Subcutaneous every 7 days    Type 2 diabetes mellitus with mild nonproliferative retinopathy without macular edema, with long-term current use of insulin, unspecified laterality (H)       fluticasone 50 MCG/ACT spray    FLONASE    1 Bottle    Spray 1-2 sprays into both nostrils daily    Seasonal allergic rhinitis, unspecified chronicity, unspecified trigger       gabapentin 300 MG capsule    NEURONTIN    168 capsule    TAKE 2 CAPSULES (600MG) BY MOUTH THREE TIMES A DAY    Type 2 diabetes mellitus with diabetic neuropathy, with long-term current use of insulin (H)       glucose 4 g Chew chewable tablet     20 tablet    Take 2 every 15 minutes for blood sugar <70mg/dL. Recheck blood sugar every 15 minutes until above 70mg/dL, then eat a substantial meal.    Type 2 diabetes mellitus with mild nonproliferative retinopathy without macular edema, with long-term current use of insulin, unspecified laterality (H)       guaiFENesin-codeine 100-10 MG/5ML Soln solution    ROBITUSSIN AC    120 mL    Take 5 mLs by mouth every 6 hours as needed for cough    Cough       ibuprofen 600 MG tablet    ADVIL/MOTRIN    60 tablet    Take 1 tablet (600 mg) by mouth every 4 hours as needed for moderate pain    Closed fracture of one rib of right side, initial encounter       insulin aspart 100 UNIT/ML injection    NovoLOG FLEXPEN    15 mL    Inject 20 Units Subcutaneous 3 times daily (with meals) Once daily, can add additional 5 units if BGs are >500mg/dL.    Type 2 diabetes mellitus with mild nonproliferative retinopathy without macular edema, with long-term current use of insulin,  unspecified laterality (H)       insulin pen needle 30G X 8 MM    NOVOFINE 30    400 each    USE 4 DAILY OR AS DIRECTED    Type 2 diabetes mellitus with mild nonproliferative retinopathy without macular edema, with long-term current use of insulin, unspecified laterality (H)       ipratropium - albuterol 0.5 mg/2.5 mg/3 mL 0.5-2.5 (3) MG/3ML neb solution    DUONEB    30 vial    Take 1 vial (3 mLs) by nebulization every 6 hours as needed for shortness of breath / dyspnea or wheezing    Wheezing       levofloxacin 750 MG tablet    LEVAQUIN    7 tablet    Take 1 tablet (750 mg) by mouth daily    Acute bacterial bronchitis       losartan 100 MG tablet    COZAAR    28 tablet    TAKE 1 TABLET (100MG) BY MOUTH DAILY    Coronary artery disease involving native heart with angina pectoris, unspecified vessel or lesion type (H)       melatonin 5 MG Caps     180 capsule    Take 2 capsules by mouth daily At dinnertime    Insomnia, unspecified type       metoprolol succinate 25 MG 24 hr tablet    TOPROL-XL    30 tablet    Take 1 tablet (25 mg) by mouth daily    Coronary artery disease involving native heart with angina pectoris, unspecified vessel or lesion type (H)       * order for DME     1 each    Hold Novolog if meals are refused.    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    Generalized muscle weakness       order for DME     1 Units    Equipment being ordered: Nebulizer Machine    Wheezing, Cough, Pneumonia due to infectious organism, unspecified laterality, unspecified part of lung       paliperidone 9 MG 24 hr  tablet    INVEGA    30 tablet    Take 1 tablet (9 mg) by mouth every morning    Schizoaffective disorder, bipolar type (H)       polyethylene glycol powder    MIRALAX/GLYCOLAX    527 g    TAKE 8.5 GRAMS (1/2 CAPFUL) BY MOUTH DAILY    Constipation, unspecified constipation type       prochlorperazine 5 MG tablet    COMPAZINE    90 tablet    Take 1 tablet (5 mg) by mouth every 6 hours as needed for nausea or vomiting    Nausea       ranitidine 300 MG tablet    ZANTAC    90 tablet    TAKE 1 TABLET (300MG) BY MOUTH AT BEDTIME    Gastroesophageal reflux disease without esophagitis       senna-docusate 8.6-50 MG per tablet    SENOKOT-S;PERICOLACE    100 tablet    Take 1 tablet by mouth 2 times daily as needed for constipation    Drug-induced constipation       sertraline 100 MG tablet    ZOLOFT    60 tablet    Take 2 tablets (200 mg) by mouth daily    Schizoaffective disorder, bipolar type (H)       SUMAtriptan 25 MG tablet    IMITREX    12 tablet    Take 1-2 tablets (25-50 mg) by mouth at onset of headache for migraine May repeat in 2 hours. Max 8 tablets/24 hours.    Migraine with aura and without status migrainosus, not intractable       topiramate 25 MG tablet    TOPAMAX    90 tablet    25mg at bedtime for week 1, 50mg at bedtime for 1 week, and 75mg at bedtime thereafter    Morbid obesity (H)       vitamin D3 85566 UNITS capsule    CHOLECALCIFEROL    12 capsule    TAKE 1 CAPSULE (50,000 UNITS) MONTHLY    Vitamin D deficiency       * Notice:  This list has 6 medication(s) that are the same as other medications prescribed for you. Read the directions carefully, and ask your doctor or other care provider to review them with you.

## 2018-08-22 NOTE — MR AVS SNAPSHOT
After Visit Summary   8/22/2018    Nena Tang    MRN: [id number]           Patient Information     Date Of Birth          1960        Visit Information        Provider Department      8/22/2018 3:00 PM Adriana Georges APRN [facility]        Today's Diagnoses     Acute bacterial bronchitis    -  1    Cough        Seasonal allergic rhinitis, unspecified chronicity, unspecified trigger          Care Instructions    Start Levaquin 750 mg daily for seven days (hold your calcium while taking)  Start using your nebulizer 2-3 times daily until your lungs feel better.  Take 1 tsp of the Robitussin at bedtime if needed for your cough.  Start Flonase spray to open up your nose.  Bronchitis, Antibiotic Treatment (Adult)    Bronchitis is an infection of the air passages (bronchial tubes) in your lungs. It often occurs when you have a cold. This illness is contagious during the first few days and is spread through the air by coughing and sneezing, or by direct contact (touching the sick person and then touching your own eyes, nose, or mouth).  Symptoms of bronchitis include cough with mucus (phlegm) and low-grade fever. Bronchitis usually lasts 7 to 14 days. Mild cases can be treated with simple home remedies. More severe infection is treated with an antibiotic.  Home care  Follow these guidelines when caring for yourself at home:    If your symptoms are severe, rest at home for the first 2 to 3 days. When you go back to your usual activities, don't let yourself get too tired.    Do not smoke. Also avoid being exposed to secondhand smoke.    You may use over-the-counter medicines to control fever or pain, unless another medicine was prescribed. If you have chronic liver or kidney disease or have ever had a stomach ulcer or gastrointestinal bleeding, talk with your healthcare provider before using these medicines. Also talk to your provider if you are taking  medicine to prevent blood clots. Aspirin should never be given to anyone younger than 18 years of age who is ill with a viral infection or fever. It may cause severe liver or brain damage.    Your appetite may be poor, so a light diet is fine. Avoid dehydration by drinking 6 to 8 glasses of fluids per day (such as water, soft drinks, sports drinks, juices, tea, or soup). Extra fluids will help loosen secretions in the nose and lungs.    Over-the-counter cough, cold, and sore-throat medicines will not shorten the length of the illness, but they may be helpful to reduce symptoms. (Note: Do not use decongestants if you have high blood pressure.)    Finish all antibiotic medicine. Do this even if you are feeling better after only a few days.  Follow-up care  Follow up with your healthcare provider, or as advised. If you had an X-ray or ECG (electrocardiogram), a specialist will review it. You will be notified of any new findings that may affect your care.  If you are age 65 or older, or if you have a chronic lung disease or condition that affects your immune system, or you smoke, ask your healthcare provider about getting a pneumococcal vaccine and a yearly flu shot (influenza vaccine).  When to seek medical advice  Call your healthcare provider right away if any of these occur:    Fever of 100.4 F (38 C) or higher, or as directed by your healthcare provider    Coughing up increased amounts of colored sputum    Weakness, drowsiness, headache, facial pain, ear pain, or a stiff neck  Call 911  Call 911 if any of these occur.    Coughing up blood    Worsening weakness, drowsiness, headache, or stiff neck    Trouble breathing, wheezing, or pain with breathing  Date Last Reviewed: 9/13/2015 2000-2017 [employer]. [address] [place] [address], [place] [address]. All rights reserved. This information is not intended as a substitute for professional medical care. Always follow your healthcare professional's  instructions.                Follow-ups after your visit        Your next 10 appointments already scheduled     Aug 28, 2018  1:30 PM CDT   Return Visit with BIN Mondragon   [facility]    [address]  [address]  [facility] [address]   [phone]            Aug 28, 2018  2:15 PM CDT   MA SCREENING DIGITAL BILATERAL with URBCMA1   [facility] ([facility])    [address], [address]  [facility] [id number] [phone]           Do not use any powder, lotion or deodorant under your arms or on your breast. If you do, we will ask you to remove it before your exam.  Wear comfortable, two-piece clothing.  If you have any allergies, tell your care team.  Bring any previous mammograms from other facilities or have them mailed to the breast center.            Aug 30, 2018  3:30 PM CDT   RETURN RETINA with Tiburcio Chacon MD   [facility] ([facility])    [address]  [address] [address]  [facility] [address]   [phone]            Aug 31, 2018  2:30 PM CDT   Return Visit with Kraig Pedersen MD   [facility]    [address]  [facility] [address] [phone]            Sep 06, 2018 11:00 AM CDT   Return Visit with ART Arias CNP   [facility] ([facility])    [address]  [address]  [facility] [address]   [phone]            Sep 06, 2018 11:00 AM CDT   [facility]   [facility]    [address]  [address]  [facility] [address]   [phone]            Sep 06, 2018 11:00 AM CDT   SHORT with Eugenia De Jesus [facility]  [facility]    [address] [place] [address] [address]  [facility] [id number] [phone]            Sep 10, 2018 10:45 AM CDT   (Arrive by 10:30 AM)   Return Weight Management Visit with Debbie Germain PA-C   [facility] ([facility])    [address]  4th Floor  [facility] [id number] [phone]              Who to contact     If you have questions or need follow up information about today's clinic visit or your schedule please contact [facility] directly at [phone].  Normal or non-critical lab and imaging results will be communicated to you by [employer], letter or phone within 4 business days after the clinic has received the results. If you do not hear from us within 7 days, please contact the clinic through [employer] or phone. If you have a critical or abnormal lab result, we will notify you by phone as soon as possible.  Submit refill requests through [employer] or call your pharmacy and they will forward the refill request to us. Please allow 3 business days for your refill to be completed.          Additional Information About Your Visit        [employer] Information     [employer] gives you secure access to your electronic health record. If you see a primary care provider, you can also send messages to your care team and make appointments. If you have questions, please call your primary care clinic.  If you do not have a primary care provider, please call [phone] and they will assist you.        Care EveryWhere ID     This is your Care EveryWhere ID. This could be used by other organizations to access your [place] medical records  [id number]        Your Vitals Were     Pulse Temperature Respirations Last Period Pulse Oximetry BMI (Body Mass Index)    95 98.4  F (36.9  C) (Oral) 18 01/06/2015 95% 45.71 kg/m2       Blood Pressure from Last 3 Encounters:   08/22/18 110/58   08/06/18 122/64    07/16/18 92/58    Weight from Last 3 Encounters:   08/22/18 239 lb (108.4 kg)   08/06/18 238 lb (108 kg)   07/16/18 235 lb (106.6 kg)              Today, you had the following     No orders found for display         Today's Medication Changes          These changes are accurate as of 8/22/18  3:15 PM.  If you have any questions, ask your nurse or doctor.               Start taking these medicines.        Dose/Directions    fluticasone 50 MCG/ACT spray   Commonly known as:  FLONASE   Used for:  Seasonal allergic rhinitis, unspecified chronicity, unspecified trigger   Started by:  Adriana Georges APRN CNP        Dose:  1-2 spray   Spray 1-2 sprays into both nostrils daily   Quantity:  1 Bottle   Refills:  11       levofloxacin 750 MG tablet   Commonly known as:  LEVAQUIN   Used for:  Acute bacterial bronchitis   Started by:  Adriana Georges APRN CNP        Dose:  750 mg   Take 1 tablet (750 mg) by mouth daily   Quantity:  7 tablet   Refills:  0         These medicines have changed or have updated prescriptions.        Dose/Directions    guaiFENesin-codeine 100-10 MG/5ML Soln solution   Commonly known as:  ROBITUSSIN AC   This may have changed:  when to take this   Used for:  Cough   Changed by:  Adriana Georges APRN CNP        Dose:  1 tsp.   Take 5 mLs by mouth every 6 hours as needed for cough   Quantity:  120 mL   Refills:  0            Where to get your medicines      These medications were sent to [facility] [place] [address] [place] - [address], [place] [address]     Phone:  [phone]     fluticasone 50 MCG/ACT spray    ipratropium - albuterol 0.5 mg/2.5 mg/3 mL 0.5-2.5 (3) MG/3ML neb solution    levofloxacin 750 MG tablet         Some of these will need a paper prescription and others can be bought over the counter.  Ask your nurse if you have questions.     Bring a paper prescription for each of these medications     guaiFENesin-codeine  100-10 MG/5ML Soln solution                Primary Care Provider Office Phone # Fax #    ART Arias [phone]       [address] [address]        Goals        General    Medical (pt-stated)     Notes - Note created  7/7/2016  9:15 AM by Chelsea Pulido RN    Get blood sugars under control    Improve medication compliance    As of today's date 7/7/2016 goal is met at 0 - 25%.   Goal Status:  Active          Equal Access to Services     [facility]: Hadii aad ku hadasho Soomaali, waaxda luqadaha, qaybta kaalmada adeegyada, waxay idiin hayaan adeeg kharash scot . So [facility] [phone].    ATENCIÓN: Si habla español, tiene a trinh disposición servicios gratuitos de asistencia lingüística. Llame al [phone].    We comply with applicable federal civil rights laws and [place] laws. We do not discriminate on the basis of race, color, national origin, age, disability, sex, sexual orientation, or gender identity.            Thank you!     Thank you for choosing [facility]  for your care. Our goal is always to provide you with excellent care. Hearing back from our patients is one way we can continue to improve our services. Please take a few minutes to complete the written survey that you may receive in the mail after your visit with us. Thank you!             Your Updated Medication List - Protect others around you: Learn how to safely use, store and throw away your medicines at www.disposemymeds.org.          This list is accurate as of 8/22/18  3:15 PM.  Always use your most recent med list.                   Brand Name Dispense Instructions for use Diagnosis    * ACETAMINOPHEN PO      Take 1,000 mg by mouth every 6 hours as needed for pain or fever        * acetaminophen 500 MG tablet    TYLENOL    100 tablet    Take 2 tablets (1,000 mg) by mouth 3 times daily as needed for mild pain    Chronic low back pain, unspecified back  pain laterality, with sciatica presence unspecified       albuterol 108 (90 Base) MCG/ACT inhaler    PROAIR HFA/PROVENTIL HFA/VENTOLIN HFA    1 Inhaler    Inhale 2 puffs into the lungs every 6 hours as needed for shortness of breath / dyspnea or wheezing    Pneumonia, organism unspecified(486)       aspirin 81 MG EC tablet     28 tablet    TAKE 1 TABLET (81MG) BY MOUTH DAILY    Coronary artery disease involving native heart with angina pectoris, unspecified vessel or lesion type (H)       atorvastatin 80 MG tablet    LIPITOR    30 tablet    TAKE 1 TABLET (80MG) BY MOUTH DAILY    Hyperlipidemia LDL goal <100       benztropine 0.5 MG tablet    COGENTIN    60 tablet    Take 1 tablet (0.5 mg) by mouth 2 times daily    Extrapyramidal and movement disorder       blood glucose monitoring lancets     150 each    Use to test blood sugar 2 times daily or as directed.  Ok to substitute alternative if insurance prefers.    Type 2 diabetes mellitus with diabetic neuropathy, with long-term current use of insulin (H)       * [employer] TEST STRIPS test strip   Generic drug:  blood glucose monitoring           * blood glucose monitoring test strip    ONETOUCH VERIO IQ    200 strip    Use to test blood sugar 4 times daily .  Ok to substitute alternative if insurance prefers.    Type 2 diabetes mellitus with diabetic neuropathy, with long-term current use of insulin (H)       calcium carbonate 1500 (600 Ca) MG tablet    OS-ALLEN 600 mg Yuhaaviatam. Ca    180 tablet    Take 1 tablet (1,500 mg) by mouth daily    Other osteoporosis without current pathological fracture       clotrimazole 1 % cream    LOTRIMIN     Apply topically 2 times daily        DIPHENDRYL PO      Take 25 mg by mouth every 6 hours as needed for itching        dulaglutide 1.5 MG/0.5ML pen    TRULICITY    2 mL    Inject 1.5 mg Subcutaneous every 7 days    Type 2 diabetes mellitus with mild nonproliferative retinopathy without macular edema, with long-term current use of insulin,  unspecified laterality (H)       fluticasone 50 MCG/ACT spray    FLONASE    1 Bottle    Spray 1-2 sprays into both nostrils daily    Seasonal allergic rhinitis, unspecified chronicity, unspecified trigger       gabapentin 300 MG capsule    NEURONTIN    168 capsule    TAKE 2 CAPSULES (600MG) BY MOUTH THREE TIMES A DAY    Type 2 diabetes mellitus with diabetic neuropathy, with long-term current use of insulin (H)       glucose 4 g Chew chewable tablet     20 tablet    Take 2 every 15 minutes for blood sugar <70mg/dL. Recheck blood sugar every 15 minutes until above 70mg/dL, then eat a substantial meal.    Type 2 diabetes mellitus with mild nonproliferative retinopathy without macular edema, with long-term current use of insulin, unspecified laterality (H)       guaiFENesin-codeine 100-10 MG/5ML Soln solution    ROBITUSSIN AC    120 mL    Take 5 mLs by mouth every 6 hours as needed for cough    Cough       ibuprofen 600 MG tablet    ADVIL/MOTRIN    60 tablet    Take 1 tablet (600 mg) by mouth every 4 hours as needed for moderate pain    Closed fracture of one rib of right side, initial encounter       insulin aspart 100 UNIT/ML injection    NovoLOG FLEXPEN    15 mL    Inject 20 Units Subcutaneous 3 times daily (with meals) Once daily, can add additional 5 units if BGs are >500mg/dL.    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, with long-term current use of insulin, unspecified laterality (H)       insulin pen needle 30G X 8 MM    NOVOFINE 30    400 each    USE 4 DAILY OR AS DIRECTED    Type 2 diabetes mellitus with mild nonproliferative retinopathy without macular edema, with long-term current use of insulin,  unspecified laterality (H)       ipratropium - albuterol 0.5 mg/2.5 mg/3 mL 0.5-2.5 (3) MG/3ML neb solution    DUONEB    30 vial    Take 1 vial (3 mLs) by nebulization every 6 hours as needed for shortness of breath / dyspnea or wheezing    Wheezing       levofloxacin 750 MG tablet    LEVAQUIN    7 tablet    Take 1 tablet (750 mg) by mouth daily    Acute bacterial bronchitis       losartan 100 MG tablet    COZAAR    28 tablet    TAKE 1 TABLET (100MG) BY MOUTH DAILY    Coronary artery disease involving native heart with angina pectoris, unspecified vessel or lesion type (H)       melatonin 5 MG Caps     180 capsule    Take 2 capsules by mouth daily At dinnertime    Insomnia, unspecified type       metoprolol succinate 25 MG 24 hr tablet    TOPROL-XL    30 tablet    Take 1 tablet (25 mg) by mouth daily    Coronary artery disease involving native heart with angina pectoris, unspecified vessel or lesion type (H)       * order for DME     1 each    Hold Novolog if meals are refused.    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    Generalized muscle weakness       order for DME     1 Units    Equipment being ordered: Nebulizer Machine    Wheezing, Cough, Pneumonia due to infectious organism, unspecified laterality, unspecified part of lung       paliperidone 9 MG 24 hr tablet    INVEGA    30 tablet    Take 1 tablet (9 mg) by mouth every morning    Schizoaffective disorder, bipolar type (H)       polyethylene glycol powder    MIRALAX/GLYCOLAX    527 g    TAKE 8.5 GRAMS (1/2 CAPFUL) BY MOUTH DAILY    Constipation,  unspecified constipation type       prochlorperazine 5 MG tablet    COMPAZINE    90 tablet    Take 1 tablet (5 mg) by mouth every 6 hours as needed for nausea or vomiting    Nausea       ranitidine 300 MG tablet    ZANTAC    90 tablet    TAKE 1 TABLET (300MG) BY MOUTH AT BEDTIME    Gastroesophageal reflux disease without esophagitis       senna-docusate 8.6-50 MG per tablet    SENOKOT-S;PERICOLACE    100 tablet    Take 1 tablet by mouth 2 times daily as needed for constipation    Drug-induced constipation       sertraline 100 MG tablet    ZOLOFT    60 tablet    Take 2 tablets (200 mg) by mouth daily    Schizoaffective disorder, bipolar type (H)       SUMAtriptan 25 MG tablet    IMITREX    12 tablet    Take 1-2 tablets (25-50 mg) by mouth at onset of headache for migraine May repeat in 2 hours. Max 8 tablets/24 hours.    Migraine with aura and without status migrainosus, not intractable       topiramate 25 MG tablet    TOPAMAX    90 tablet    25mg at bedtime for week 1, 50mg at bedtime for 1 week, and 75mg at bedtime thereafter    Morbid obesity (H)       vitamin D3 70572 UNITS capsule    CHOLECALCIFEROL    12 capsule    TAKE 1 CAPSULE (50,000 UNITS) MONTHLY    Vitamin D deficiency       * Notice:  This list has 6 medication(s) that are the same as other medications prescribed for you. Read the directions carefully, and ask your doctor or other care provider to review them with you.

## 2018-08-22 NOTE — PROGRESS NOTES
"SUBJECTIVE/OBJECTIVE:                Nena Tang is a 58 year old female coming in for a follow-up visit for Medication Therapy Management.  She was referred to me from ***.     Chief Complaint: Follow up from our visit on ***.  Cough, not feeling well    Tobacco: {Tobacco use:389988}{Tobacco Cessation needed for ACO -- Delete if patient is a non-smoker:660347}  Alcohol: {ALCOHOL CONSUMPTION HX:625978}    Medication Adherence/Access:  {fumedadherence:877310}    cough: ***  Sleep apnea: ***  Diabetes: ***  Back pain: ***  Depression/PTSD/schizoaffective: Currently taking invega 9mg every morning and artane 2mg TID (increased dose), also restarted on sertraline and taking 100mg daily.  Less tearful and sad today. Visits drop-in center every day to distract (cards, movies, and parties).   Continues to have hallucinations, visual and auditory. Difficulty sleeping due to hallucination of man in her room. See [facility] note for additional details.  SE: bothersome leg tremor has improved dramatically with discontinuation of Haldol. Pt has noticeable tongue thrusting today, less prevalent with dose increase in Artane. Pt also notes improvement and denies SE to Artane.     Today's Vitals: LMP 01/06/2015  BP Readings from Last 3 Encounters:   08/22/18 110/58   08/06/18 122/64   07/16/18 92/58        ASSESSMENT:              Current medications were reviewed today as discussed above.  {mtmpartdquestion:138232}    Medication Adherence: {adherenceassess:635645}, {ADHERENCEOPTIONSASSES:070243}    ***: ***  ***: ***  ***: ***  ***: ***  ***: ***     PLAN:                  ***    I spent {time:165798} with this patient today{MTMpartdbillingquestion:420568}. { :668671}. A copy of the visit note was provided to the patient's {ccd chart:998503} provider.     Will follow up in ***.    The patient {GIVEN/NOT GIVEN:520131::\"was given\"} a summary of these recommendations as an after visit summary.    ***  "

## 2018-08-22 NOTE — PROGRESS NOTES
SUBJECTIVE:   Nena Tang is a 58 year old female with a history of type II DM, CINDY, CAD, hypertension, and schizoaffective disorder who presents to clinic today for the following health issues:        RESPIRATORY SYMPTOMS : Cough     Duration: about ten days     Description  nasal congestion, rhinorrhea, cough, chills, headache, fatigue/malaise and pleuritic rib pain    Severity: severe    Accompanying signs and symptoms:nasal congestion, rhinorrhea, cough, chills, headache, fatigue/malaise and stomach ache     History (predisposing factors):  none    Precipitating or alleviating factors: None    Therapies tried and outcome:  None    Patient reports history of pneumonia in June and was treated with Zithromax. She completed the course of antibiotics and symptoms did not completely subside. She was then seen in the ER, and was placed on Augmentin. She was also given Robitussin AC. She states that the symptoms did then eventually improve.    Today patient states she began experiencing rhinorrhea and sinus pressure about ten days ago. She then developed respiratory symptoms a few days later. She reports a productive cough with occasional wheezing and pleuritic pain. Denies any known fevers. She has not tried any remedies for relief.     Otherwise, patient states she is doing okay. She was denied her housing application. States she plans to start her part time job as a  at the [employer] tomorrow. Feels she will be well enough at that time to do her job.    States her blood sugars are stable. She is working on weight loss. She would like to lose about 50-60 lbs.       Problem list and histories reviewed & adjusted, as indicated.  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 (H)      Illiterate     Irritable bowel syndrome     overweight - BMI >35     Takotsubo cardiomyopathy     CAD (coronary artery disease)     Restless legs syndrome (RLS)     CINDY (obstructive sleep apnea)- mild AHI 10.3     Verbal auditory hallucination     Chronic low back pain     Schizoaffective disorder, depressive type (H)     Migraine headache     HTN, goal below 140/90     Health Care Home     Lumbago     Cervicalgia     Cocaine abuse, episodic     Suicidal ideation     Esophageal reflux     Mild nonproliferative diabetic retinopathy (H)     Tardive dyskinesia     Alcohol use     Left cataract     Falls frequently     History of uterine cancer     Psychophysiological insomnia     Dysuria     Asymptomatic postmenopausal status     Abdominal pain, right lower quadrant     Sepsis (H)     Pneumonia of right lower lobe due to infectious organism (H)     Infectious encephalopathy     Non-intractable vomiting with nausea     Acute respiratory failure with hypoxia (H)     Thoughts of self harm     Gastroenteritis     Posttraumatic stress disorder     Cervical cancer screening     Type 2 diabetes mellitus with stage 3 chronic kidney disease, with long-term current use of insulin (H)     Past Surgical History:   Procedure Laterality Date     C OOPHORECTORMY FOR RAHEL, W/BX  1983    UTERINE     CATARACT IOL, RT/LT Bilateral 2017     COLONOSCOPY N/A 3/16/2017    Procedure: COLONOSCOPY;  Surgeon: Traci Gonzalez MD;  Location:  GI     Coronary CTA  5/21/2014     HYSTERECTOMY  1983    uterine cancer yearly pap's per provider.     LAPAROSCOPIC CHOLECYSTECTOMY  2008     PHACOEMULSIFICATION CLEAR CORNEA WITH STANDARD INTRAOCULAR LENS IMPLANT Left 5/5/2017    Procedure: PHACOEMULSIFICATION CLEAR CORNEA WITH STANDARD INTRAOCULAR LENS IMPLANT;  LEFT EYE PHACOEMULSIFICATION CLEAR CORNEA WITH STANDARD INTRAOCULAR LENS IMPLANT ;  Surgeon: Tyra Diaz MD;  Location: [facility]     PHACOEMULSIFICATION CLEAR CORNEA WITH STANDARD  INTRAOCULAR LENS IMPLANT Right 2017    Procedure: PHACOEMULSIFICATION CLEAR CORNEA WITH STANDARD INTRAOCULAR LENS IMPLANT;  RIGHT EYE PHACOEMULSIFICATION CLEAR CORNEA WITH STANDARD INTRAOCULAR LENS IMPLANT;  Surgeon: Tyra Diaz MD;  Location:  [facility]     RELEASE TRIGGER FINGER  10/11/2012    Left thumb. Procedure: RELEASE TRIGGER FINGER;  LEFT THUMB TRIGGER RELEASE;  Surgeon: Tay Langley MD;  Location:  [facility]     RELEASE TRIGGER FINGER Right 2016    Procedure: RELEASE TRIGGER FINGER;  Surgeon: Albino Castañeda MD;  Location:  OR       Social History   Substance Use Topics     Smoking status: Never Smoker     Smokeless tobacco: Never Used     Alcohol use No      Comment: last month     Family History   Problem Relation Age of Onset     Cancer Mother      BLADDER     Respiratory Mother      COPD     GASTROINTESTINAL DISEASE Mother      CIRRHOSIS OF LI BOLIVAR     Alcohol/Drug Mother      Diabetes Mother      Hypertension Mother      Lipids Mother      C.A.D. Mother      Glaucoma Mother      Alcohol/Drug Sister      Mental Illness Sister      Alcohol/Drug Sister      Psychotic Disorder Sister      Cancer Maternal Grandmother      UNKNOWN TYPE     Cancer Brother      COLON     Cancer - colorectal Brother      IN HIS LATE 30S     Alcohol/Drug Brother       OF HEROIN OVERDOSE AT AGE 22 YRS     Macular Degeneration No family hx of          Current Outpatient Prescriptions   Medication Sig Dispense Refill     fluticasone (FLONASE) 50 MCG/ACT spray Spray 1-2 sprays into both nostrils daily 1 Bottle 11     guaiFENesin-codeine (ROBITUSSIN AC) 100-10 MG/5ML SOLN solution Take 5 mLs by mouth every 6 hours as needed for cough 120 mL 0     levofloxacin (LEVAQUIN) 750 MG tablet Take 1 tablet (750 mg) by mouth daily 7 tablet 0     acetaminophen (TYLENOL) 500 MG tablet Take 2 tablets (1,000 mg) by mouth 3 times daily as needed for mild pain 100 tablet 3     ACETAMINOPHEN PO Take 1,000 mg by mouth every 6  hours as needed for pain or fever       albuterol (PROAIR HFA/PROVENTIL HFA/VENTOLIN HFA) 108 (90 Base) MCG/ACT Inhaler Inhale 2 puffs into the lungs every 6 hours as needed for shortness of breath / dyspnea or wheezing 1 Inhaler 0     aspirin 81 MG EC tablet TAKE 1 TABLET (81MG) BY MOUTH DAILY 28 tablet 3     atorvastatin (LIPITOR) 80 MG tablet TAKE 1 TABLET (80MG) BY MOUTH DAILY 30 tablet 11     benztropine (COGENTIN) 0.5 MG tablet Take 1 tablet (0.5 mg) by mouth 2 times daily 60 tablet 1     blood glucose monitoring ([employer] TEST STRIPS) test strip        blood glucose monitoring (ONE TOUCH DELICA) lancets Use to test blood sugar 2 times daily or as directed.  Ok to substitute alternative if insurance prefers. 150 each 11     blood glucose monitoring (ONETOUCH VERIO IQ) test strip Use to test blood sugar 4 times daily .  Ok to substitute alternative if insurance prefers. 200 strip 11     calcium carbonate (OS-ALLEN 600 MG [place]. CA) 1500 (600 CA) MG tablet Take 1 tablet (1,500 mg) by mouth daily 180 tablet 3     clotrimazole (LOTRIMIN) 1 % cream Apply topically 2 times daily       DiphenhydrAMINE HCl (DIPHENDRYL PO) Take 25 mg by mouth every 6 hours as needed for itching       dulaglutide (TRULICITY) 1.5 MG/0.5ML pen Inject 1.5 mg Subcutaneous every 7 days 2 mL 3     gabapentin (NEURONTIN) 300 MG capsule TAKE 2 CAPSULES (600MG) BY MOUTH THREE TIMES A  capsule 1     glucose 4 G CHEW chewable tablet Take 2 every 15 minutes for blood sugar <70mg/dL. Recheck blood sugar every 15 minutes until above 70mg/dL, then eat a substantial meal. 20 tablet 1     ibuprofen (ADVIL,MOTRIN) 600 MG tablet Take 1 tablet (600 mg) by mouth every 4 hours as needed for moderate pain 60 tablet 0     insulin aspart (NOVOLOG FLEXPEN) 100 UNIT/ML injection Inject 20 Units Subcutaneous 3 times daily (with meals) Once daily, can add additional 5 units if BGs are >500mg/dL. 15 mL 11     insulin glargine (LANTUS) 100 UNIT/ML injection Inject  48 Units Subcutaneous At Bedtime 3 mL 11     insulin pen needle (NOVOFINE 30) 30G X 8 MM USE 4 DAILY OR AS DIRECTED 400 each 0     ipratropium - albuterol 0.5 mg/2.5 mg/3 mL (DUONEB) 0.5-2.5 (3) MG/3ML neb solution Take 1 vial (3 mLs) by nebulization every 6 hours as needed for shortness of breath / dyspnea or wheezing 30 vial 0     losartan (COZAAR) 100 MG tablet TAKE 1 TABLET (100MG) BY MOUTH DAILY 28 tablet 3     melatonin 5 MG CAPS Take 2 capsules by mouth daily At dinnertime 180 capsule 3     metoprolol succinate (TOPROL-XL) 25 MG 24 hr tablet Take 1 tablet (25 mg) by mouth daily 30 tablet 1     order for DME Equipment being ordered: Nebulizer Machine 1 Units 0     order for DME Equipment being ordered: 4 Wheeled walker with seat and brakes. 1 each 0     order for DME Diabetic Shoes 2 each 0     order for DME Hold Novolog if meals are refused. 1 each 0     paliperidone (INVEGA) 9 MG 24 hr tablet Take 1 tablet (9 mg) by mouth every morning 30 tablet 3     polyethylene glycol (MIRALAX/GLYCOLAX) powder TAKE 8.5 GRAMS (1/2 CAPFUL) BY MOUTH DAILY 527 g 3     prochlorperazine (COMPAZINE) 5 MG tablet Take 1 tablet (5 mg) by mouth every 6 hours as needed for nausea or vomiting 90 tablet 0     ranitidine (ZANTAC) 300 MG tablet TAKE 1 TABLET (300MG) BY MOUTH AT BEDTIME 90 tablet 1     senna-docusate (SENOKOT-S;PERICOLACE) 8.6-50 MG per tablet Take 1 tablet by mouth 2 times daily as needed for constipation 100 tablet 1     sertraline (ZOLOFT) 100 MG tablet Take 2 tablets (200 mg) by mouth daily 60 tablet 1     SUMAtriptan (IMITREX) 25 MG tablet Take 1-2 tablets (25-50 mg) by mouth at onset of headache for migraine May repeat in 2 hours. Max 8 tablets/24 hours. 12 tablet 1     topiramate (TOPAMAX) 25 MG tablet 25mg at bedtime for week 1, 50mg at bedtime for 1 week, and 75mg at bedtime thereafter 90 tablet 3     vitamin D3 (CHOLECALCIFEROL) 83634 UNITS capsule TAKE 1 CAPSULE (50,000 UNITS) MONTHLY 12 capsule 1      [DISCONTINUED] ipratropium - albuterol 0.5 mg/2.5 mg/3 mL (DUONEB) 0.5-2.5 (3) MG/3ML neb solution Take 1 vial (3 mLs) by nebulization every 6 hours as needed for shortness of breath / dyspnea or wheezing 30 vial 0     Allergies   Allergen Reactions     Imidazole Antifungals Hives     Tolerates diflucan     Ketoprofen Itching     Pruritis to topical     Lisinopril Hives     Metformin Other (See Comments)     Patient hospitalized for lactic acidosis - admitting provider suspectd caused by metformin     Metronidazole Hives     Posaconazole Hives     Tolerates diflucan     Recent Labs   Lab Test  08/06/18   1038  06/29/18   1333  05/28/18   1625  05/22/18   1434  02/12/18   1408  02/08/18   2155   01/13/18   2315   11/07/17   0801   06/27/17   1358   12/29/16   0909   07/13/16   1350   07/14/15   1215   A1C  6.4*   --    --   6.2*  6.8*   --    --    --    < >  8.9*   < >  7.0*   < >   --    < >   --    < >  9.1*   LDL  84   --    --    --    --    --    --    --    --    --    --   64   --    --    --   137*   --   78   HDL  46*   --    --    --    --    --    --    --    --    --    --   37*   --    --    --    --    --   39*   TRIG  215*   --    --    --    --    --    --    --    --    --    --   267*   --    --    --    --    --   151*   ALT   --    --   27   --    --   23   --   29   --   23   < >  32   < >  26   < >   --    < >   --    CR   --   1.06*  1.16*   --    --   1.09*   < >  1.62*   < >  0.88   < >  0.78   < >  0.72   < >   --    < >  0.67   GFRESTIMATED   --   53*  48*   --    --   52*   < >  33*   < >  66   < >  76   < >  83   < >   --    < >  >90  Non  GFR Calc     GFRESTBLACK   --   65  58*   --    --   63   < >  40*   < >  80   < >  >90   GFR Calc     < >  >90   GFR Calc     < >   --    < >  >90   GFR Calc     POTASSIUM   --   4.7  4.5   --    --   4.3   < >  4.0   < >  3.9   < >  4.3   < >  4.2   < >   --    < >  4.3   TSH   --    --     --    --    --    --    --    --    --   3.21   --    --    --   2.24   < >   --    < >   --     < > = values in this interval not displayed.      BP Readings from Last 3 Encounters:   08/22/18 110/58   08/06/18 122/64   07/16/18 92/58    Wt Readings from Last 3 Encounters:   08/22/18 239 lb (108.4 kg)   08/06/18 238 lb (108 kg)   07/16/18 235 lb (106.6 kg)                    Reviewed and updated as needed this visit by clinical staff       Reviewed and updated as needed this visit by Provider         ROS:  Constitutional, HEENT, cardiovascular, pulmonary, gi and gu systems are negative, except as otherwise noted.    OBJECTIVE:     /58  Pulse 95  Temp 98.4  F (36.9  C) (Oral)  Resp 18  Wt 239 lb (108.4 kg)  LMP 01/06/2015  SpO2 95%  BMI 45.71 kg/m2  Body mass index is 45.71 kg/(m^2).  GENERAL: healthy, alert and no distress  HENT: normal cephalic/atraumatic, ear canals and TM's normal, nose and mouth without ulcers or lesions, nasal mucosa edematous , rhinorrhea clear and yellow, oropharynx clear, oral mucous membranes moist and sinuses: maxillary, frontal tenderness on bilateral  NECK: bilateral anterior cervical adenopathy and no asymmetry, masses, or scars  RESP: expiratory wheezes R lower posterior, L mid posterior and L lower posterior, inspiratory wheezes L upper posterior, no use of accessory muscles  CV: regular rate and rhythm, normal S1 S2, no S3 or S4, no murmur, click or rub, no peripheral edema and peripheral pulses strong    Diagnostic Test Results:  Results for orders placed or performed in visit on 08/06/18   HEMOGLOBIN A1C   Result Value Ref Range    Hemoglobin A1C 6.4 (H) 0 - 5.6 %   Lipid panel reflex to direct LDL Fasting   Result Value Ref Range    Cholesterol 173 <200 mg/dL    Triglycerides 215 (H) <150 mg/dL    HDL Cholesterol 46 (L) >49 mg/dL    LDL Cholesterol Calculated 84 <100 mg/dL    Non HDL Cholesterol 127 <130 mg/dL     *Note: Due to a large number of results and/or  encounters for the requested time period, some results have not been displayed. A complete set of results can be found in Results Review.       ASSESSMENT/PLAN:     Diabetes Type II, A1c Controlled, insulin dependent   Associated with the following complications    Renal Complications:  None    Ophthalmologic Complications: Macular Edema    Neurologic Complications: None    Peripheral Vascular Complications:  None    Other: None   Plan:  No changes in the patient's current treatment plan      Depression; recurrent episode-- Moderate   Associated with the following complications:    Heart Disease and Diabetes   Plan:  No changes in the patient's current treatment plan        (J20.8,  B96.89) Acute bacterial bronchitis  (primary encounter diagnosis)  Comment: patient has completed two antibiotics in the past few months. Will treat with Levaquin 750 mg po daily x 7 days.  -Nebulizer treatments bid-tid for next few days  Robitussin with codeine at HS if needed.  Plan: levofloxacin (LEVAQUIN) 750 MG tablet,         ALBUTEROL/IPRATROPIUM 3ML NEB - .001,         INHALATION/NEBULIZER TREATMENT, INITIAL            (R05) Cough  Comment: refilled on Robitussin and neb solution  -encouraged rest, fluids  Plan: guaiFENesin-codeine (ROBITUSSIN AC) 100-10         MG/5ML SOLN solution, ALBUTEROL/IPRATROPIUM 3ML        NEB - .001            (J30.2) Seasonal allergic rhinitis, unspecified chronicity, unspecified trigger  Comment: Start Flonase daily for nasal symptoms.  Plan: fluticasone (FLONASE) 50 MCG/ACT spray,         ALBUTEROL/IPRATROPIUM 3ML NEB - .001              Patient Instructions   Start Levaquin 750 mg daily for seven days (hold your calcium while taking)  Start using your nebulizer 2-3 times daily until your lungs feel better.  Take 1 tsp of the Robitussin at bedtime if needed for your cough.  Start Flonase spray to open up your nose.  Bronchitis, Antibiotic Treatment (Adult)    Bronchitis is an  infection of the air passages (bronchial tubes) in your lungs. It often occurs when you have a cold. This illness is contagious during the first few days and is spread through the air by coughing and sneezing, or by direct contact (touching the sick person and then touching your own eyes, nose, or mouth).  Symptoms of bronchitis include cough with mucus (phlegm) and low-grade fever. Bronchitis usually lasts 7 to 14 days. Mild cases can be treated with simple home remedies. More severe infection is treated with an antibiotic.  Home care  Follow these guidelines when caring for yourself at home:    If your symptoms are severe, rest at home for the first 2 to 3 days. When you go back to your usual activities, don't let yourself get too tired.    Do not smoke. Also avoid being exposed to secondhand smoke.    You may use over-the-counter medicines to control fever or pain, unless another medicine was prescribed. If you have chronic liver or kidney disease or have ever had a stomach ulcer or gastrointestinal bleeding, talk with your healthcare provider before using these medicines. Also talk to your provider if you are taking medicine to prevent blood clots. Aspirin should never be given to anyone younger than 18 years of age who is ill with a viral infection or fever. It may cause severe liver or brain damage.    Your appetite may be poor, so a light diet is fine. Avoid dehydration by drinking 6 to 8 glasses of fluids per day (such as water, soft drinks, sports drinks, juices, tea, or soup). Extra fluids will help loosen secretions in the nose and lungs.    Over-the-counter cough, cold, and sore-throat medicines will not shorten the length of the illness, but they may be helpful to reduce symptoms. (Note: Do not use decongestants if you have high blood pressure.)    Finish all antibiotic medicine. Do this even if you are feeling better after only a few days.  Follow-up care  Follow up with your healthcare provider, or as  advised. If you had an X-ray or ECG (electrocardiogram), a specialist will review it. You will be notified of any new findings that may affect your care.  If you are age 65 or older, or if you have a chronic lung disease or condition that affects your immune system, or you smoke, ask your healthcare provider about getting a pneumococcal vaccine and a yearly flu shot (influenza vaccine).  When to seek medical advice  Call your healthcare provider right away if any of these occur:    Fever of 100.4 F (38 C) or higher, or as directed by your healthcare provider    Coughing up increased amounts of colored sputum    Weakness, drowsiness, headache, facial pain, ear pain, or a stiff neck  Call 911  Call 911 if any of these occur.    Coughing up blood    Worsening weakness, drowsiness, headache, or stiff neck    Trouble breathing, wheezing, or pain with breathing  Date Last Reviewed: 9/13/2015 2000-2017 [employer]. [address] [place] [address]. All rights reserved. This information is not intended as a substitute for professional medical care. Always follow your healthcare professional's instructions.            [facility] PRIMARY CARE

## 2018-08-22 NOTE — PATIENT INSTRUCTIONS
Start Levaquin 750 mg daily for seven days (hold your calcium while taking)  Start using your nebulizer 2-3 times daily until your lungs feel better.  Take 1 tsp of the Robitussin at bedtime if needed for your cough.  Start Flonase spray to open up your nose.  Bronchitis, Antibiotic Treatment (Adult)    Bronchitis is an infection of the air passages (bronchial tubes) in your lungs. It often occurs when you have a cold. This illness is contagious during the first few days and is spread through the air by coughing and sneezing, or by direct contact (touching the sick person and then touching your own eyes, nose, or mouth).  Symptoms of bronchitis include cough with mucus (phlegm) and low-grade fever. Bronchitis usually lasts 7 to 14 days. Mild cases can be treated with simple home remedies. More severe infection is treated with an antibiotic.  Home care  Follow these guidelines when caring for yourself at home:    If your symptoms are severe, rest at home for the first 2 to 3 days. When you go back to your usual activities, don't let yourself get too tired.    Do not smoke. Also avoid being exposed to secondhand smoke.    You may use over-the-counter medicines to control fever or pain, unless another medicine was prescribed. If you have chronic liver or kidney disease or have ever had a stomach ulcer or gastrointestinal bleeding, talk with your healthcare provider before using these medicines. Also talk to your provider if you are taking medicine to prevent blood clots. Aspirin should never be given to anyone younger than 18 years of age who is ill with a viral infection or fever. It may cause severe liver or brain damage.    Your appetite may be poor, so a light diet is fine. Avoid dehydration by drinking 6 to 8 glasses of fluids per day (such as water, soft drinks, sports drinks, juices, tea, or soup). Extra fluids will help loosen secretions in the nose and lungs.    Over-the-counter cough, cold, and sore-throat  medicines will not shorten the length of the illness, but they may be helpful to reduce symptoms. (Note: Do not use decongestants if you have high blood pressure.)    Finish all antibiotic medicine. Do this even if you are feeling better after only a few days.  Follow-up care  Follow up with your healthcare provider, or as advised. If you had an X-ray or ECG (electrocardiogram), a specialist will review it. You will be notified of any new findings that may affect your care.  If you are age 65 or older, or if you have a chronic lung disease or condition that affects your immune system, or you smoke, ask your healthcare provider about getting a pneumococcal vaccine and a yearly flu shot (influenza vaccine).  When to seek medical advice  Call your healthcare provider right away if any of these occur:    Fever of 100.4 F (38 C) or higher, or as directed by your healthcare provider    Coughing up increased amounts of colored sputum    Weakness, drowsiness, headache, facial pain, ear pain, or a stiff neck  Call 911  Call 911 if any of these occur.    Coughing up blood    Worsening weakness, drowsiness, headache, or stiff neck    Trouble breathing, wheezing, or pain with breathing  Date Last Reviewed: 9/13/2015 2000-2017 [employer]. [address] [place] [address], [place] [address]. All rights reserved. This information is not intended as a substitute for professional medical care. Always follow your healthcare professional's instructions.

## 2018-08-24 DIAGNOSIS — E11.3299 TYPE 2 DIABETES MELLITUS WITH MILD NONPROLIFERATIVE RETINOPATHY WITHOUT MACULAR EDEMA, WITH LONG-TERM CURRENT USE OF INSULIN, UNSPECIFIED LATERALITY (H): ICD-10-CM

## 2018-08-24 DIAGNOSIS — Z79.4 TYPE 2 DIABETES MELLITUS WITH MILD NONPROLIFERATIVE RETINOPATHY WITHOUT MACULAR EDEMA, WITH LONG-TERM CURRENT USE OF INSULIN, UNSPECIFIED LATERALITY (H): ICD-10-CM

## 2018-08-24 NOTE — PROGRESS NOTES
SUBJECTIVE/OBJECTIVE:                Nena Tang is a 58 year old female coming in for a follow-up visit for Medication Therapy Management.  She was referred to me from Rowena Haas. Seen as a covisit with Adriana Georges.      Chief Complaint: Follow up from our visit on 4/17/18.  Cough, not feeling well    Tobacco: No tobacco use  Alcohol: not currently using     Medication Adherence/Access:  no issues reported, set up and administered by group home staff, except when pt leaves group home (ex lunches at [facility], trip to [place] to see her sister)    cough: Recent treatment for pneumonia in June with Zithromax and symptoms did not entirely resolve, so then treatment with Augmentin. Symptoms then did improve but about a week ago starting having cough and runny nose, occasional wheezing, fatigue, rib pain.     Sleep apnea: has not been using CPAP, not wanting to go into details today but has discussed with [facility] previously and thinks she would be able to start using it again, as long as nightlight is on. Pt has history of visual hallucinations with the light off.     Diabetes:  Pt currently taking lantus 48u QHS, novolog 20u TID with meals, and trulicity 1.5mg subcutaneously each week.  Pt is not experiencing side effects.  SMBG: four times daily. Ranges (pt reported)  Low to mid 100s usually  Patient is rarely experiencing hypoglycemia   Recent symptoms of high blood sugar? none  Eye exam: up to date  Foot exam: up to date  Microalbumin is < 30 mg/g. Pt is taking an ACEi/ARB.  Aspirin: Taking 81mg daily and denies side effects  Diet/Exercise:  Has been doing more walking, would like to lose weight  Lab Results   Component Value Date    A1C 6.4 08/06/2018    A1C 6.2 05/22/2018    A1C 6.8 02/12/2018    A1C 8.9 12/09/2017    A1C 8.9 11/07/2017     Depression/PTSD/schizoaffective: Currently taking invega 9mg every morning and benztropine 0.5mg BID, sertraline 200mg daily.    Reports symptoms as improved. Had  been denied for social security and was feeling down but working on an appeal, feeling hopeful about this. She is looking forward to starting a new part-time job tomorrow as a  at the [employer].  Side effects: TD has dramatically improved compared to last visit, no tongue thrusting noted    Today's Vitals: LMP 01/06/2015  BP Readings from Last 3 Encounters:   08/22/18 110/58   08/06/18 122/64   07/16/18 92/58        ASSESSMENT:              Current medications were reviewed today as discussed above.      Medication Adherence: good, no issues identified    cough: needs improvement, see NP's note for details of assessment and treatment plan.     Sleep apnea: needs improvement. Discussed consistent use of CPAP and pt is willing to attempt restarting    diabetes: stable.     Depression/PTSD/schizoaffective: stable     PLAN:                  Start levofloxacin per NP  Restart use of CPAP    I spent 20 minutes with this patient today. All changes were made via collaborative practice agreement with Rowena Haas. A copy of the visit note was provided to the patient's primary care provider.     Will follow up in 1-2 months.    The patient was given a summary of these recommendations as an after visit summary.    Eugenia De Jesus, PharmD, BCACP

## 2018-08-24 NOTE — TELEPHONE ENCOUNTER
Group [place] [facility] is stating that they ran out of Lantus as pharmacy has requested it from us, but clinic has not approved refill yet. No previous requests found. Will send over refill for Lantus. Group home nurse also questioning status of Robitussin and Duoneb, as neither will be covered by insurance. Writer will look into both the medications of concern.     Corine Cunha RN  08/24/18  9:47 AM

## 2018-08-28 ENCOUNTER — RADIANT APPOINTMENT (OUTPATIENT)
Dept: MAMMOGRAPHY | Facility: CLINIC | Age: 58
End: 2018-08-28
Attending: NURSE PRACTITIONER
Payer: MEDICARE

## 2018-08-28 ENCOUNTER — OFFICE VISIT (OUTPATIENT)
Dept: BEHAVIORAL HEALTH | Facility: CLINIC | Age: 58
End: 2018-08-28
Payer: MEDICARE

## 2018-08-28 DIAGNOSIS — F43.10 POSTTRAUMATIC STRESS DISORDER: ICD-10-CM

## 2018-08-28 DIAGNOSIS — F25.1 SCHIZOAFFECTIVE DISORDER, DEPRESSIVE TYPE (H): Primary | ICD-10-CM

## 2018-08-28 DIAGNOSIS — Z12.31 VISIT FOR SCREENING MAMMOGRAM: ICD-10-CM

## 2018-08-28 DIAGNOSIS — F14.10 COCAINE ABUSE, EPISODIC (H): ICD-10-CM

## 2018-08-28 DIAGNOSIS — Z78.9 ALCOHOL USE: ICD-10-CM

## 2018-08-28 PROCEDURE — 90832 PSYTX W PT 30 MINUTES: CPT | Performed by: SOCIAL WORKER

## 2018-08-28 PROCEDURE — 77067 SCR MAMMO BI INCL CAD: CPT

## 2018-08-28 NOTE — MR AVS SNAPSHOT
After Visit Summary   8/28/2018    Nena Tang    MRN: [id number]           Patient Information     Date Of Birth          1960        Visit Information        Provider Department      8/28/2018 1:30 PM Richardson Lopez [facility]        Today's Diagnoses     Schizoaffective disorder, depressive type (H)    -  1    Posttraumatic stress disorder        Alcohol use        Cocaine abuse, episodic           Follow-ups after your visit        Your next 10 appointments already scheduled     Aug 31, 2018  2:30 PM CDT   Return Visit with Kraig Pedersen MD   [facility] ([facility])    [address]  [facility] [address]   [phone]            Sep 06, 2018 11:00 AM CDT   Return Visit with ART Arias CNP   [facility] ([facility])    [address]  [address]  [facility] [address]   [phone]            Sep 06, 2018 11:00 AM CDT   Return Visit with Richardson Lopez [facility] ([facility])    [address]  [address]  [facility] [address]   [phone]            Sep 06, 2018 11:00 AM CDT   SHORT with Eugenia De Jesus [facility] ([facility])    [address] [place] [address]  [address]  [facility] [address]   [phone]            Sep 10, 2018 10:45 AM CDT   (Arrive by 10:30 AM)   Return Weight Management Visit with Debbie Germain PA-C   [facility] ([facility])    [address]  4th [address]  [facility] [address]   [phone]            Sep 11, 2018  3:00 PM CDT   Return Visit with Richardson Lopez [facility] ([facility])    [address]  [address]  [facility] [address]   [phone]            Sep 17, 2018  2:45 PM CDT   RETURN RETINA with Tiburcio Chacon MD   [facility] ([facility])    [address]  [address]  [facility] [address] [phone]              Who to contact     If you have questions or need follow up information about today's clinic visit or your schedule please contact [facility] directly at [phone].  Normal or non-critical lab and imaging results will be communicated to you by [employer], letter or phone within 4 business days after the clinic has received the results. If you do not hear from us within 7 days, please contact the clinic through [employer] or phone. If you have a critical or abnormal lab result, we will notify you by phone as soon as possible.  Submit refill requests through [employer] or call your pharmacy and they will forward the refill request to us. Please allow 3 business days for your refill to be completed.          Additional Information About Your Visit        MyChart Information     [employer] gives you secure access to your electronic health record. If you see a primary care provider, you can also send messages to your care team and make appointments. If you have questions, please call your primary care clinic.  If you do not have a primary care provider, please call [phone] and they will assist you.        Care EveryWhere ID     This is your Care EveryWhere ID. This could be used by other organizations to access your [facility] medical records  [id number]        Your Vitals Were     Last Period                   01/06/2015            Blood Pressure from Last 3 Encounters:   08/22/18 110/58   08/06/18 122/64   07/16/18 92/58    Weight from Last 3 Encounters:   08/22/18 239 lb (108.4 kg)   08/06/18 238 lb (108 kg)   07/16/18 235 lb (106.6 kg)              Today, you had the following     No  orders found for display       Primary Care Provider Office Phone # Fax #    ART Arias [phone]       [address] [address]        Goals        General    Medical (pt-stated)     Notes - Note created  7/7/2016  9:15 AM by Chelsea Pulido RN    Get blood sugars under control    Improve medication compliance    As of today's date 7/7/2016 goal is met at 0 - 25%.   Goal Status:  Active          Equal Access to Services     RAMESH [facility]NE : Hadii aad ku hadasho Soomaali, waaxda luqadaha, qaybta kaalmada adeegyada, waxay idiin hayaan adeeg kharash laherrera . So [facility] [phone].    ATENCIÓN: Si habla español, tiene a trinh disposición servicios gratuitos de asistencia lingüística. Llame al [phone].    We comply with applicable federal civil rights laws and [place] laws. We do not discriminate on the basis of race, color, national origin, age, disability, sex, sexual orientation, or gender identity.            Thank you!     Thank you for choosing [facility]  for your care. Our goal is always to provide you with excellent care. Hearing back from our patients is one way we can continue to improve our services. Please take a few minutes to complete the written survey that you may receive in the mail after your visit with us. Thank you!             Your Updated Medication List - Protect others around you: Learn how to safely use, store and throw away your medicines at www.disposemymeds.org.          This list is accurate as of 8/28/18 11:59 PM.  Always use your most recent med list.                   Brand Name Dispense Instructions for use Diagnosis    * ACETAMINOPHEN PO      Take 1,000 mg by mouth every 6 hours as needed for pain or fever        * acetaminophen 500 MG tablet    TYLENOL    100 tablet    Take 2 tablets (1,000 mg) by mouth 3 times daily as needed for mild pain    Chronic low back pain, unspecified back pain  laterality, with sciatica presence unspecified       albuterol 108 (90 Base) MCG/ACT inhaler    PROAIR HFA/PROVENTIL HFA/VENTOLIN HFA    1 Inhaler    Inhale 2 puffs into the lungs every 6 hours as needed for shortness of breath / dyspnea or wheezing    Pneumonia, organism unspecified(486)       aspirin 81 MG EC tablet     28 tablet    TAKE 1 TABLET (81MG) BY MOUTH DAILY    Coronary artery disease involving native heart with angina pectoris, unspecified vessel or lesion type (H)       atorvastatin 80 MG tablet    LIPITOR    30 tablet    TAKE 1 TABLET (80MG) BY MOUTH DAILY    Hyperlipidemia LDL goal <100       blood glucose monitoring lancets     150 each    Use to test blood sugar 2 times daily or as directed.  Ok to substitute alternative if insurance prefers.    Type 2 diabetes mellitus with diabetic neuropathy, with long-term current use of insulin (H)       * [employer] TEST STRIPS test strip   Generic drug:  blood glucose monitoring           * blood glucose monitoring test strip    ONETOUCH VERIO IQ    200 strip    Use to test blood sugar 4 times daily .  Ok to substitute alternative if insurance prefers.    Type 2 diabetes mellitus with diabetic neuropathy, with long-term current use of insulin (H)       calcium carbonate 600 mg {elemental} 1500 (600 Ca) MG tablet    OS-ALLEN    180 tablet    Take 1 tablet (1,500 mg) by mouth daily    Other osteoporosis without current pathological fracture       clotrimazole 1 % cream    LOTRIMIN     Apply topically 2 times daily        DIPHENDRYL PO      Take 25 mg by mouth every 6 hours as needed for itching        dulaglutide 1.5 MG/0.5ML pen    TRULICITY    2 mL    Inject 1.5 mg Subcutaneous every 7 days    Type 2 diabetes mellitus with mild nonproliferative retinopathy without macular edema, with long-term current use of insulin, unspecified laterality (H)       fluticasone 50 MCG/ACT spray    FLONASE    1 Bottle    Spray 1-2 sprays into both nostrils daily    Seasonal  allergic rhinitis, unspecified chronicity, unspecified trigger       gabapentin 300 MG capsule    NEURONTIN    168 capsule    TAKE 2 CAPSULES (600MG) BY MOUTH THREE TIMES A DAY    Type 2 diabetes mellitus with diabetic neuropathy, with long-term current use of insulin (H)       glucose 4 g Chew chewable tablet     20 tablet    Take 2 every 15 minutes for blood sugar <70mg/dL. Recheck blood sugar every 15 minutes until above 70mg/dL, then eat a substantial meal.    Type 2 diabetes mellitus with mild nonproliferative retinopathy without macular edema, with long-term current use of insulin, unspecified laterality (H)       guaiFENesin-codeine 100-10 MG/5ML Soln solution    ROBITUSSIN AC    120 mL    Take 5 mLs by mouth every 6 hours as needed for cough    Cough       ibuprofen 600 MG tablet    ADVIL/MOTRIN    60 tablet    Take 1 tablet (600 mg) by mouth every 4 hours as needed for moderate pain    Closed fracture of one rib of right side, initial encounter       insulin aspart 100 UNIT/ML injection    NovoLOG FLEXPEN    15 mL    Inject 20 Units Subcutaneous 3 times daily (with meals) Once daily, can add additional 5 units if BGs are >500mg/dL.    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, with long-term current use of insulin, unspecified laterality (H)       insulin pen needle 30G X 8 MM    NOVOFINE 30    400 each    USE 4 DAILY OR AS DIRECTED    Type 2 diabetes mellitus with mild nonproliferative retinopathy without macular edema, with long-term current use of insulin, unspecified laterality (H)       ipratropium - albuterol 0.5 mg/2.5 mg/3 mL 0.5-2.5 (3) MG/3ML neb solution    DUONEB    30 vial    Take 1 vial (3  mLs) by nebulization every 6 hours as needed for shortness of breath / dyspnea or wheezing    Wheezing       levofloxacin 750 MG tablet    LEVAQUIN    7 tablet    Take 1 tablet (750 mg) by mouth daily    Acute bacterial bronchitis       losartan 100 MG tablet    COZAAR    28 tablet    TAKE 1 TABLET (100MG) BY MOUTH DAILY    Coronary artery disease involving native heart with angina pectoris, unspecified vessel or lesion type (H)       melatonin 5 MG Caps     180 capsule    Take 2 capsules by mouth daily At dinnertime    Insomnia, unspecified type       metoprolol succinate 25 MG 24 hr tablet    TOPROL-XL    30 tablet    Take 1 tablet (25 mg) by mouth daily    Coronary artery disease involving native heart with angina pectoris, unspecified vessel or lesion type (H)       * order for DME     1 each    Hold Novolog if meals are refused.    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    Generalized muscle weakness       order for DME     1 Units    Equipment being ordered: Nebulizer Machine    Wheezing, Cough, Pneumonia due to infectious organism, unspecified laterality, unspecified part of lung       paliperidone 9 MG 24 hr tablet    INVEGA    30 tablet    Take 1 tablet (9 mg) by mouth every morning    Schizoaffective disorder, bipolar type (H)       polyethylene glycol powder    MIRALAX/GLYCOLAX    527 g    TAKE 8.5 GRAMS (1/2 CAPFUL) BY MOUTH DAILY    Constipation, unspecified constipation type       prochlorperazine 5 MG tablet    COMPAZINE    90 tablet    Take 1 tablet (5 mg) by mouth every 6 hours as needed  for nausea or vomiting    Nausea       ranitidine 300 MG tablet    ZANTAC    90 tablet    TAKE 1 TABLET (300MG) BY MOUTH AT BEDTIME    Gastroesophageal reflux disease without esophagitis       senna-docusate 8.6-50 MG per tablet    SENOKOT-S;PERICOLACE    100 tablet    Take 1 tablet by mouth 2 times daily as needed for constipation    Drug-induced constipation       sertraline 100 MG tablet    ZOLOFT    60 tablet    Take 2 tablets (200 mg) by mouth daily    Schizoaffective disorder, bipolar type (H)       SUMAtriptan 25 MG tablet    IMITREX    12 tablet    Take 1-2 tablets (25-50 mg) by mouth at onset of headache for migraine May repeat in 2 hours. Max 8 tablets/24 hours.    Migraine with aura and without status migrainosus, not intractable       topiramate 25 MG tablet    TOPAMAX    90 tablet    25mg at bedtime for week 1, 50mg at bedtime for 1 week, and 75mg at bedtime thereafter    Morbid obesity (H)       vitamin D3 97974 UNITS capsule    CHOLECALCIFEROL    12 capsule    TAKE 1 CAPSULE (50,000 UNITS) MONTHLY    Vitamin D deficiency       * Notice:  This list has 6 medication(s) that are the same as other medications prescribed for you. Read the directions carefully, and ask your doctor or other care provider to review them with you.

## 2018-08-28 NOTE — PROGRESS NOTES
[facility]   August 28, 2018      Behavioral Health Clinician Progress Note    Patient Name: Nena Tang           Service Type:  Individual      Service Location:   Face to Face in Clinic     Session Start Time: 1:35pm  Session End Time: 2:05pm      Session Length: 16 - 37      Attendees: Patient    Visit Activities (Refresh list every visit): [facility]    Diagnostic Assessment Date: 1-23-18  Treatment Plan Review Date: Not completed yet  See Flowsheets for today's PHQ-9 and TAMIA-7 results  Previous PHQ-9:   PHQ-9 SCORE 1/2/2017 2/3/2017 3/13/2018   Total Score - - -   Total Score - - -   Total Score 0 0 14     Previous TAMIA-7:   TAMIA-7 SCORE 1/2/2017 2/3/2017 3/13/2018   Total Score - - -   Total Score 0 0 17   Total Score - - -       ALEXANDRA LEVEL:  ALEXANDRA Score (Last Two) 8/30/2011 6/9/2014   ALEXANDRA Raw Score 42 37   Activation Score 66 49.9   ALEXANDRA Level 3 2       DATA  Extended Session (60+ minutes): No  Interactive Complexity: No  Crisis: No  [facility] Patient: No    Treatment Objective(s) Addressed in This Session:  Target Behavior(s): disease management/lifestyle changes mental health    Depressed Mood: Increase interest, engagement, and pleasure in doing things  Decrease frequency and intensity of feeling down, depressed, hopeless  Improve quantity and quality of night time sleep / decrease daytime naps  Feel less tired and more energy during the day   Identify negative self-talk and behaviors: challenge core beliefs, myths, and actions  Improve concentration, focus, and mindfulness in daily activities   Decrease thoughts that you'd be better off dead or of suicide / self-harm  Anxiety: will experience a reduction in anxiety, will develop more effective coping skills to manage anxiety symptoms, will develop healthy cognitive patterns and beliefs and will increase ability to function adaptively  Alcohol / Substance Use: continue to make healthy choices regarding substance use and engage in  activities / supportive services that promote sobriety  Thought Disturbance: will develop skills to more effectively manage symptoms, will develop more effective support systems and will continue to take medications as prescribed    Current Stressors / Issues:    The patient reported that she was hired for a job and do to some issues with her social security card she was not able to get the job-she stated that she had her daughter in law about the job and she was hired-the patient stated that initially she was upset but then she found peace with her getting the job-she stated that she and ILS worker are going to follow up about her social security being miss used-she learned that she is employable-she has a hearing to appeal the housing decision so that she will get approved to move in-she talked about the man and how he blow out the light in when you having negative (the man thoughts) put God into your mouth-no suicidal thoughts-    Progress on Treatment Objective(s) / Homework:  Minimal progress - ACTION (Actively working towards change); Intervened by reinforcing change plan / affirming steps taken    Motivational Interviewing    MI Intervention: Expressed Empathy/Understanding, Supported Autonomy, Collaboration, Evocation, Permission to raise concern or advise, Open-ended questions, Reflections: simple and complex, Rolled with resistance: Emphasized patient autonomy, Simple reflection and Evoked patient agenda and Change talk (evoked)     Change Talk Expressed by the Patient: Desire to change Ability to change Reasons to change Need to change Committment to change Activation Taking steps    Provider Response to Change Talk: E - Evoked more info from patient about behavior change, A - Affirmed patient's thoughts, decisions, or attempts at behavior change, R - Reflected patient's change talk and S - Summarized patient's change talk statements      Care Plan review completed: No    Medication Review:  No changes to  current psychiatric medication(s)   Medication Compliance:  NA    Changes in Health Issues:   None reported     Chemical Use Review:   Substance Use: Chemical use reviewed, no active concerns identified      Tobacco Use: No current tobacco use.      Assessment: Current Emotional / Mental Status (status of significant symptoms):  Risk status (Self / Other harm or suicidal ideation)  Patient has had a history of suicidal ideation: yes  Patient denies current fears or concerns for personal safety.  Patient denies current or recent suicidal ideation or behaviors.  Patient denies current or recent homicidal ideation or behaviors.  Patient denies current or recent self injurious behavior or ideation.  Patient denies other safety concerns.  A safety and risk management plan has not been developed at this time, however patient was encouraged to call [place] [phone] should there be a change in any of these risk factors.    Appearance:   Appropriate   Eye Contact:   Good   Psychomotor Behavior: Normal   Attitude:   Cooperative   Orientation:   All  Speech   Rate / Production: Normal    Volume:  Normal   Mood:    Normal  Affect:    Appropriate  Bright  Worrisome   Thought Content:  Clear   Thought Form:  Coherent   Insight:    Good     Diagnoses:  1. Schizoaffective disorder, depressive type (H)    2. Posttraumatic stress disorder    3. Alcohol use    4. Cocaine abuse, episodic        Collateral Reports Completed:  Not Applicable    Plan: (Homework, other):  Patient was given information about behavioral services and encouraged to schedule a follow up appointment with the clinic [facility] as needed.  She was also given information about mental health symptoms and treatment options .  CD Recommendations: Maintain Sobriety.    BIN George, [facility]      ______________________________________________________________________

## 2018-08-31 ENCOUNTER — OFFICE VISIT (OUTPATIENT)
Dept: PSYCHIATRY | Facility: CLINIC | Age: 58
End: 2018-08-31
Payer: MEDICARE

## 2018-08-31 DIAGNOSIS — F25.0 SCHIZOAFFECTIVE DISORDER, BIPOLAR TYPE (H): ICD-10-CM

## 2018-08-31 DIAGNOSIS — G25.9 EXTRAPYRAMIDAL AND MOVEMENT DISORDER: ICD-10-CM

## 2018-08-31 DIAGNOSIS — G47.00 INSOMNIA, UNSPECIFIED TYPE: ICD-10-CM

## 2018-08-31 PROCEDURE — 99214 OFFICE O/P EST MOD 30 MIN: CPT | Performed by: PSYCHIATRY & NEUROLOGY

## 2018-08-31 PROCEDURE — 90833 PSYTX W PT W E/M 30 MIN: CPT | Performed by: PSYCHIATRY & NEUROLOGY

## 2018-08-31 RX ORDER — PALIPERIDONE 9 MG/1
9 TABLET, EXTENDED RELEASE ORAL EVERY MORNING
Qty: 30 TABLET | Refills: 2 | Status: SHIPPED | OUTPATIENT
Start: 2018-08-31 | End: 2018-11-20

## 2018-08-31 RX ORDER — BENZTROPINE MESYLATE 0.5 MG/1
0.5 TABLET ORAL 2 TIMES DAILY
Qty: 60 TABLET | Refills: 2 | Status: SHIPPED | OUTPATIENT
Start: 2018-08-31 | End: 2018-11-20

## 2018-08-31 RX ORDER — SERTRALINE HYDROCHLORIDE 100 MG/1
200 TABLET, FILM COATED ORAL DAILY
Qty: 60 TABLET | Refills: 2 | Status: SHIPPED | OUTPATIENT
Start: 2018-08-31 | End: 2018-11-20

## 2018-08-31 NOTE — MR AVS SNAPSHOT
MRN:[id number]                      After Visit Summary   8/31/2018    Nena Tang    MRN: [id number]           Visit Information        Provider Department      8/31/2018 2:30 PM Kraig Pedersen MD [facility]        Your next 10 appointments already scheduled     Sep 06, 2018 11:00 AM CDT   Return Visit with ART Arias CNP   [facility] ([facility])    [address]  [address]  [facility] [address]   [phone]            Sep 06, 2018 11:00 AM CDT   Return Visit with Richardson Lopez [facility] ([facility])    [address]  [address]  [facility] [address] [phone]            Sep 06, 2018 11:00 AM CDT   SHORT with Eugenia De Jesus [facility] [place] ([facility])    [address] [place] [address]  [address]  [facility] [address] [phone]            Sep 10, 2018 10:45 AM CDT   (Arrive by 10:30 AM)   Return Weight Management Visit with Debbie Germain PA-C   [facility] ([facility])    [address] [facility]  [address]th [facility] [address] [phone]            Sep 11, 2018  3:00 PM CDT   Return Visit with Richardson Lopez [facility] ([facility])    [address]  [address]  [facility] [address]   [phone]            Sep 17, 2018  2:45 PM CDT   RETURN RETINA with Tiburcio Chacon MD   [facility] ([facility])    [address]  [facility] [address] [place] [id number] [phone]              Care Instructions    - Continue current psychiatric medications.  Current Outpatient Prescriptions   Medication Sig     benztropine (COGENTIN) 0.5  MG tablet Take 1 tablet (0.5 mg) by mouth 2 times daily     melatonin 5 MG CAPS Take 2 capsules by mouth daily At dinnertime     paliperidone (INVEGA) 9 MG 24 hr tablet Take 1 tablet (9 mg) by mouth every morning     sertraline (ZOLOFT) 100 MG tablet Take 2 tablets (200 mg) by mouth daily     acetaminophen (TYLENOL) 500 MG tablet Take 2 tablets (1,000 mg) by mouth 3 times daily as needed for mild pain     ACETAMINOPHEN PO Take 1,000 mg by mouth every 6 hours as needed for pain or fever     albuterol (PROAIR HFA/PROVENTIL HFA/VENTOLIN HFA) 108 (90 Base) MCG/ACT Inhaler Inhale 2 puffs into the lungs every 6 hours as needed for shortness of breath / dyspnea or wheezing     aspirin 81 MG EC tablet TAKE 1 TABLET (81MG) BY MOUTH DAILY     atorvastatin (LIPITOR) 80 MG tablet TAKE 1 TABLET (80MG) BY MOUTH DAILY     blood glucose monitoring ([employer] TEST STRIPS) test strip      blood glucose monitoring (ONE TOUCH DELICA) lancets Use to test blood sugar 2 times daily or as directed.  Ok to substitute alternative if insurance prefers.     blood glucose monitoring (ONETOUCH VERIO IQ) test strip Use to test blood sugar 4 times daily .  Ok to substitute alternative if insurance prefers.     calcium carbonate (OS-ALLEN 600 MG [place]. CA) 1500 (600 CA) MG tablet Take 1 tablet (1,500 mg) by mouth daily     clotrimazole (LOTRIMIN) 1 % cream Apply topically 2 times daily     DiphenhydrAMINE HCl (DIPHENDRYL PO) Take 25 mg by mouth every 6 hours as needed for itching     dulaglutide (TRULICITY) 1.5 MG/0.5ML pen Inject 1.5 mg Subcutaneous every 7 days     fluticasone (FLONASE) 50 MCG/ACT spray Spray 1-2 sprays into both nostrils daily     gabapentin (NEURONTIN) 300 MG capsule TAKE 2 CAPSULES (600MG) BY MOUTH THREE TIMES A DAY     glucose 4 G CHEW chewable tablet Take 2 every 15 minutes for blood sugar <70mg/dL. Recheck blood sugar every 15 minutes until above 70mg/dL, then eat a substantial meal.     guaiFENesin-codeine (ROBITUSSIN AC) 100-10  MG/5ML SOLN solution Take 5 mLs by mouth every 6 hours as needed for cough     ibuprofen (ADVIL,MOTRIN) 600 MG tablet Take 1 tablet (600 mg) by mouth every 4 hours as needed for moderate pain     insulin aspart (NOVOLOG FLEXPEN) 100 UNIT/ML injection Inject 20 Units Subcutaneous 3 times daily (with meals) Once daily, can add additional 5 units if BGs are >500mg/dL.     insulin glargine (LANTUS SOLOSTAR) 100 UNIT/ML pen Inject 48 Units Subcutaneous At Bedtime     insulin pen needle (NOVOFINE 30) 30G X 8 MM USE 4 DAILY OR AS DIRECTED     ipratropium - albuterol 0.5 mg/2.5 mg/3 mL (DUONEB) 0.5-2.5 (3) MG/3ML neb solution Take 1 vial (3 mLs) by nebulization every 6 hours as needed for shortness of breath / dyspnea or wheezing     levofloxacin (LEVAQUIN) 750 MG tablet Take 1 tablet (750 mg) by mouth daily     losartan (COZAAR) 100 MG tablet TAKE 1 TABLET (100MG) BY MOUTH DAILY     metoprolol succinate (TOPROL-XL) 25 MG 24 hr tablet Take 1 tablet (25 mg) by mouth daily     order for DME Equipment being ordered: Nebulizer Machine     order for DME Equipment being ordered: 4 Wheeled walker with seat and brakes.     order for DME Diabetic Shoes     order for DME Hold Novolog if meals are refused.     polyethylene glycol (MIRALAX/GLYCOLAX) powder TAKE 8.5 GRAMS (1/2 CAPFUL) BY MOUTH DAILY     prochlorperazine (COMPAZINE) 5 MG tablet Take 1 tablet (5 mg) by mouth every 6 hours as needed for nausea or vomiting     ranitidine (ZANTAC) 300 MG tablet TAKE 1 TABLET (300MG) BY MOUTH AT BEDTIME     senna-docusate (SENOKOT-S;PERICOLACE) 8.6-50 MG per tablet Take 1 tablet by mouth 2 times daily as needed for constipation     SUMAtriptan (IMITREX) 25 MG tablet Take 1-2 tablets (25-50 mg) by mouth at onset of headache for migraine May repeat in 2 hours. Max 8 tablets/24 hours.     topiramate (TOPAMAX) 25 MG tablet 25mg at bedtime for week 1, 50mg at bedtime for 1 week, and 75mg at bedtime thereafter     vitamin D3 (CHOLECALCIFEROL) 63519  UNITS capsule TAKE 1 CAPSULE (50,000 UNITS) MONTHLY     [DISCONTINUED] benztropine (COGENTIN) 0.5 MG tablet Take 1 tablet (0.5 mg) by mouth 2 times daily     [DISCONTINUED] paliperidone (INVEGA) 9 MG 24 hr tablet Take 1 tablet (9 mg) by mouth every morning     [DISCONTINUED] sertraline (ZOLOFT) 100 MG tablet Take 2 tablets (200 mg) by mouth daily     No current facility-administered medications for this visit.      24/7 Crisis Hotlines:    National Suicide Prevention Hotline   063-564-ZLSX (6179)    Crisis Hotlines by County:    [place] Crisis Line     [phone]  [place] Crisis Line   [phone]  [facility] Crisis Line     [phone]  [place] COPE for Adults   [phone]  [place] for Children    [phone]  [facility] for Adults    [phone]  [facility] for Children    [phone]  [facility] Crisis Line    [phone]    [facility] Crisis [facility] Team  [phone] ([phone])  [facility] Crisis is available 24 hours a day. The team provides callers with a needs assessment and referrals to appropriate resources. If medically necessary, the Crisis Response Team assists individuals by traveling to their location to provide face-to-face interventions and assessments. Crisis services are available for adults and children in [facility] and [facility]. Adult Crisis beds are also available if appropriate.    Walk-In Centers and Mobile Teams  [facility] Walk-In Crisis Intervention  [phone]  [facility] (18+) Crisis Mobile Team    [phone]  [facility] Child (under 17) Crisis Mobile Team [phone]  [facility] for Adults Walk-In & Mobile Teams [phone]    Additional Crisis Hotlines:  [facility]      [phone]  Crisis Connection      [phone]  EMACS ([facility])  [phone]  [facility] Social Service (S)    [phone]  Men s Crisis Line      [phone]  ([phone])  National Suicide Prevention Hotline   016-947-UEXB (2024)  [facility] Crisis Line    [phone]  Suicide Hotline      [phone]  [facility] (formerly [facility])  Dial 2-1-10 (082-636-3597)    Domestic Violence, Rape and Sexual Abuse Hotlines:  [place] Crisis Line     [phone]  [facility] Helpline  [phone]  Domestic Abuse Project (DAP)    5-276-656-8811*  [facility] Crisis Line     [phone]  [place] [facility]  [phone]*  [facility]       [phone] ([phone]*)  National Domestic Violence Hotline   3-553-878-XZNZ  [facility]ape[facility] Abuse Center Crisis Line    [phone]   Sexual Violence Center ([facility])    [phone]  [facility] [employer]     [phone] ([phone]*)           MyChart Information     [facility] gives you secure access to your electronic health record. If you see a primary care provider, you can also send messages to your care team and make appointments. If you have questions, please call your primary care clinic.  If you do not have a primary care provider, please call [phone] and they will assist you.        Care EveryWhere ID     This is your Care EveryWhere ID. This could be used by other organizations to access your [facility] medical records  [id number]        Equal Access to Services     RAMESH GARRIDO : Hadii samira wadeo Someliaali, waaxda luqadaha, qaybta kaalmada adeegyasilvia, lorena martins. So [facility] [phone].    ATENCIÓN: Si habla español, tiene a trinh disposición servicios gratuitos de asistencia lingüística. Llame al [phone].    We comply with applicable federal civil rights laws and [place] laws. We do not discriminate on the basis of race, color, national origin, age, disability, sex, sexual orientation, or gender identity.
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
## 2018-08-31 NOTE — PATIENT INSTRUCTIONS
- Continue current psychiatric medications.  Current Outpatient Prescriptions   Medication Sig     benztropine (COGENTIN) 0.5 MG tablet Take 1 tablet (0.5 mg) by mouth 2 times daily     melatonin 5 MG CAPS Take 2 capsules by mouth daily At dinnertime     paliperidone (INVEGA) 9 MG 24 hr tablet Take 1 tablet (9 mg) by mouth every morning     sertraline (ZOLOFT) 100 MG tablet Take 2 tablets (200 mg) by mouth daily     acetaminophen (TYLENOL) 500 MG tablet Take 2 tablets (1,000 mg) by mouth 3 times daily as needed for mild pain     ACETAMINOPHEN PO Take 1,000 mg by mouth every 6 hours as needed for pain or fever     albuterol (PROAIR HFA/PROVENTIL HFA/VENTOLIN HFA) 108 (90 Base) MCG/ACT Inhaler Inhale 2 puffs into the lungs every 6 hours as needed for shortness of breath / dyspnea or wheezing     aspirin 81 MG EC tablet TAKE 1 TABLET (81MG) BY MOUTH DAILY     atorvastatin (LIPITOR) 80 MG tablet TAKE 1 TABLET (80MG) BY MOUTH DAILY     blood glucose monitoring ([employer] TEST STRIPS) test strip      blood glucose monitoring (ONE TOUCH DELICA) lancets Use to test blood sugar 2 times daily or as directed.  Ok to substitute alternative if insurance prefers.     blood glucose monitoring (ONETOUCH VERIO IQ) test strip Use to test blood sugar 4 times daily .  Ok to substitute alternative if insurance prefers.     calcium carbonate (OS-ALLEN 600 MG [place]. CA) 1500 (600 CA) MG tablet Take 1 tablet (1,500 mg) by mouth daily     clotrimazole (LOTRIMIN) 1 % cream Apply topically 2 times daily     DiphenhydrAMINE HCl (DIPHENDRYL PO) Take 25 mg by mouth every 6 hours as needed for itching     dulaglutide (TRULICITY) 1.5 MG/0.5ML pen Inject 1.5 mg Subcutaneous every 7 days     fluticasone (FLONASE) 50 MCG/ACT spray Spray 1-2 sprays into both nostrils daily     gabapentin (NEURONTIN) 300 MG capsule TAKE 2 CAPSULES (600MG) BY MOUTH THREE TIMES A DAY     glucose 4 G CHEW chewable tablet Take 2 every 15 minutes for blood sugar <70mg/dL. Recheck  blood sugar every 15 minutes until above 70mg/dL, then eat a substantial meal.     guaiFENesin-codeine (ROBITUSSIN AC) 100-10 MG/5ML SOLN solution Take 5 mLs by mouth every 6 hours as needed for cough     ibuprofen (ADVIL,MOTRIN) 600 MG tablet Take 1 tablet (600 mg) by mouth every 4 hours as needed for moderate pain     insulin aspart (NOVOLOG FLEXPEN) 100 UNIT/ML injection Inject 20 Units Subcutaneous 3 times daily (with meals) Once daily, can add additional 5 units if BGs are >500mg/dL.     insulin glargine (LANTUS SOLOSTAR) 100 UNIT/ML pen Inject 48 Units Subcutaneous At Bedtime     insulin pen needle (NOVOFINE 30) 30G X 8 MM USE 4 DAILY OR AS DIRECTED     ipratropium - albuterol 0.5 mg/2.5 mg/3 mL (DUONEB) 0.5-2.5 (3) MG/3ML neb solution Take 1 vial (3 mLs) by nebulization every 6 hours as needed for shortness of breath / dyspnea or wheezing     levofloxacin (LEVAQUIN) 750 MG tablet Take 1 tablet (750 mg) by mouth daily     losartan (COZAAR) 100 MG tablet TAKE 1 TABLET (100MG) BY MOUTH DAILY     metoprolol succinate (TOPROL-XL) 25 MG 24 hr tablet Take 1 tablet (25 mg) by mouth daily     order for DME Equipment being ordered: Nebulizer Machine     order for DME Equipment being ordered: 4 Wheeled walker with seat and brakes.     order for DME Diabetic Shoes     order for DME Hold Novolog if meals are refused.     polyethylene glycol (MIRALAX/GLYCOLAX) powder TAKE 8.5 GRAMS (1/2 CAPFUL) BY MOUTH DAILY     prochlorperazine (COMPAZINE) 5 MG tablet Take 1 tablet (5 mg) by mouth every 6 hours as needed for nausea or vomiting     ranitidine (ZANTAC) 300 MG tablet TAKE 1 TABLET (300MG) BY MOUTH AT BEDTIME     senna-docusate (SENOKOT-S;PERICOLACE) 8.6-50 MG per tablet Take 1 tablet by mouth 2 times daily as needed for constipation     SUMAtriptan (IMITREX) 25 MG tablet Take 1-2 tablets (25-50 mg) by mouth at onset of headache for migraine May repeat in 2 hours. Max 8 tablets/24 hours.     topiramate (TOPAMAX) 25 MG tablet  25mg at bedtime for week 1, 50mg at bedtime for 1 week, and 75mg at bedtime thereafter     vitamin D3 (CHOLECALCIFEROL) 74487 UNITS capsule TAKE 1 CAPSULE (50,000 UNITS) MONTHLY     [DISCONTINUED] benztropine (COGENTIN) 0.5 MG tablet Take 1 tablet (0.5 mg) by mouth 2 times daily     [DISCONTINUED] paliperidone (INVEGA) 9 MG 24 hr tablet Take 1 tablet (9 mg) by mouth every morning     [DISCONTINUED] sertraline (ZOLOFT) 100 MG tablet Take 2 tablets (200 mg) by mouth daily     No current facility-administered medications for this visit.      24/7 Crisis Hotlines:    National Suicide Prevention Hotline   996-354-QEBO (6513)    Crisis Hotlines by County:    [facility] Crisis Line     [phone]  [place] Crisis Line   [phone]  [facility] Crisis Line     [phone]  [facility]E for Adults   [phone]  [place] for Children    [phone]  [place] for Adults    [phone]  [facility] for Children    [phone]  [facility] Crisis Line    [phone]    [facility] Crisis Response Team  [phone] ([phone])  [facility] Crisis is available 24 hours a day. The team provides callers with a needs assessment and referrals to appropriate resources. If medically necessary, the Crisis Response Team assists individuals by traveling to their location to provide face-to-face interventions and assessments. Crisis services are available for adults and children in [facility] and [facility]. Adult Crisis beds are also available if appropriate.    Walk-In Centers and Mobile Teams  [facility] Walk-In Crisis Intervention  [phone]  [facility] COPE (18+) Crisis Mobile Team    [phone]  [facility] Child (under 17) Crisis Mobile Team [phone]  [place] [facility] for [facility] Walk-In & Mobile Teams [phone]    Additional Crisis Hotlines:  [facility]      [phone]  Crisis Connection      [phone]  [facility]  Crisis Services)  [phone]  [facility] Social Service (LSS)    [phone]  Men s Crisis Line      740-518-NTJQ ([phone])  National Suicide Prevention Hotline   893-022-MZWM (1183)  [facility] Crisis Line    [phone]  Suicide Hotline      [phone]  [facility] (formerly [facility])  Dial 2-1-16 ([phone])    Domestic Violence, Rape and Sexual Abuse Hotlines:  [place] Crisis Line     [phone]  [place] Helpline  [phone]  Domestic Abuse Project (DAP)    9-868-060-0036*  [place] Crisis Line     [phone]  [place] [facility]  8-964-784-1409*  [place] [facility]       [phone] ([phone]*)  National Domestic Violence Hotline   1-119-605-UOLA  Rape[facility] Abuse [facility] Crisis Line    [phone]   Sexual Violence Center (SVC)    [phone]  [facility]     [phone] ([phone]*)

## 2018-09-06 ENCOUNTER — OFFICE VISIT (OUTPATIENT)
Dept: FAMILY MEDICINE | Facility: CLINIC | Age: 58
End: 2018-09-06
Payer: MEDICARE

## 2018-09-06 ENCOUNTER — OFFICE VISIT (OUTPATIENT)
Dept: BEHAVIORAL HEALTH | Facility: CLINIC | Age: 58
End: 2018-09-06
Payer: MEDICARE

## 2018-09-06 ENCOUNTER — OFFICE VISIT (OUTPATIENT)
Dept: PHARMACY | Facility: CLINIC | Age: 58
End: 2018-09-06
Payer: COMMERCIAL

## 2018-09-06 VITALS
RESPIRATION RATE: 18 BRPM | OXYGEN SATURATION: 96 % | HEART RATE: 80 BPM | TEMPERATURE: 98.4 F | BODY MASS INDEX: 45.9 KG/M2 | WEIGHT: 240 LBS

## 2018-09-06 DIAGNOSIS — Z79.4 TYPE 2 DIABETES MELLITUS WITH HYPERGLYCEMIA, WITH LONG-TERM CURRENT USE OF INSULIN (H): ICD-10-CM

## 2018-09-06 DIAGNOSIS — E11.22 TYPE 2 DIABETES MELLITUS WITH STAGE 3 CHRONIC KIDNEY DISEASE, WITH LONG-TERM CURRENT USE OF INSULIN (H): ICD-10-CM

## 2018-09-06 DIAGNOSIS — E55.9 VITAMIN D DEFICIENCY: ICD-10-CM

## 2018-09-06 DIAGNOSIS — N18.30 TYPE 2 DIABETES MELLITUS WITH STAGE 3 CHRONIC KIDNEY DISEASE, WITH LONG-TERM CURRENT USE OF INSULIN (H): ICD-10-CM

## 2018-09-06 DIAGNOSIS — E78.5 HYPERLIPIDEMIA LDL GOAL <100: Primary | ICD-10-CM

## 2018-09-06 DIAGNOSIS — E63.9 NUTRITIONAL DEFICIENCY: ICD-10-CM

## 2018-09-06 DIAGNOSIS — F25.1 SCHIZOAFFECTIVE DISORDER, DEPRESSIVE TYPE (H): Primary | ICD-10-CM

## 2018-09-06 DIAGNOSIS — R05.9 COUGH: ICD-10-CM

## 2018-09-06 DIAGNOSIS — E66.9 OBESITY, UNSPECIFIED OBESITY SEVERITY, UNSPECIFIED OBESITY TYPE: ICD-10-CM

## 2018-09-06 DIAGNOSIS — E11.65 TYPE 2 DIABETES MELLITUS WITH HYPERGLYCEMIA, WITH LONG-TERM CURRENT USE OF INSULIN (H): ICD-10-CM

## 2018-09-06 DIAGNOSIS — R52 PAIN: ICD-10-CM

## 2018-09-06 DIAGNOSIS — F25.1 SCHIZOAFFECTIVE DISORDER, DEPRESSIVE TYPE (H): ICD-10-CM

## 2018-09-06 DIAGNOSIS — M62.81 MUSCLE WEAKNESS (GENERALIZED): Primary | ICD-10-CM

## 2018-09-06 DIAGNOSIS — Z11.4 SCREENING FOR HIV (HUMAN IMMUNODEFICIENCY VIRUS): ICD-10-CM

## 2018-09-06 DIAGNOSIS — Z13.89 SCREENING FOR DIABETIC PERIPHERAL NEUROPATHY: ICD-10-CM

## 2018-09-06 DIAGNOSIS — Z79.4 TYPE 2 DIABETES MELLITUS WITH STAGE 3 CHRONIC KIDNEY DISEASE, WITH LONG-TERM CURRENT USE OF INSULIN (H): ICD-10-CM

## 2018-09-06 PROCEDURE — 87389 HIV-1 AG W/HIV-1&-2 AB AG IA: CPT | Performed by: NURSE PRACTITIONER

## 2018-09-06 PROCEDURE — 82306 VITAMIN D 25 HYDROXY: CPT | Performed by: NURSE PRACTITIONER

## 2018-09-06 PROCEDURE — 99207 C FOOT EXAM  NO CHARGE: CPT | Performed by: NURSE PRACTITIONER

## 2018-09-06 PROCEDURE — 82043 UR ALBUMIN QUANTITATIVE: CPT | Performed by: NURSE PRACTITIONER

## 2018-09-06 PROCEDURE — 99606 MTMS BY PHARM EST 15 MIN: CPT | Performed by: PHARMACIST

## 2018-09-06 PROCEDURE — 36415 COLL VENOUS BLD VENIPUNCTURE: CPT | Performed by: NURSE PRACTITIONER

## 2018-09-06 PROCEDURE — 99215 OFFICE O/P EST HI 40 MIN: CPT | Performed by: NURSE PRACTITIONER

## 2018-09-06 PROCEDURE — 90832 PSYTX W PT 30 MINUTES: CPT | Performed by: SOCIAL WORKER

## 2018-09-06 PROCEDURE — 82550 ASSAY OF CK (CPK): CPT | Performed by: NURSE PRACTITIONER

## 2018-09-06 PROCEDURE — 99607 MTMS BY PHARM ADDL 15 MIN: CPT | Performed by: PHARMACIST

## 2018-09-06 RX ORDER — CODEINE PHOSPHATE AND GUAIFENESIN 10; 100 MG/5ML; MG/5ML
1 SOLUTION ORAL EVERY 6 HOURS PRN
Qty: 120 ML | Refills: 0 | Status: SHIPPED | OUTPATIENT
Start: 2018-09-06 | End: 2018-10-05

## 2018-09-06 RX ORDER — GUAIFENESIN 600 MG/1
600 TABLET, EXTENDED RELEASE ORAL 2 TIMES DAILY PRN
Qty: 20 TABLET | Refills: 0 | Status: SHIPPED | OUTPATIENT
Start: 2018-09-06 | End: 2018-10-05

## 2018-09-06 NOTE — PATIENT INSTRUCTIONS
-Take tylenol in the morning and at bedtime to help with the back pain.   -Will be called about scheduling for Back CT.   -Physical therapy ordered.   -Labs today.

## 2018-09-06 NOTE — PROGRESS NOTES
SUBJECTIVE/OBJECTIVE:                Nena Tang is a 58 year old female coming in for a follow-up visit for Medication Therapy Management.  She was referred to me from Rowena Haas. Seen as a covisit with PCP and Yessenia Gorman [facility].     Chief Complaint: Follow up from our visit on 8/22/18.    Tobacco: No tobacco use  Alcohol: not currently using     Medication Adherence/Access:  no issues reported, set up and administered by group home staff, except when pt leaves group home (ex lunches at [facility], trip to [place] to see her sister)    Back pain: currently taking apap 1000mg HS which doesn't seem to help much and worried about taking too much pain pills. Limits ability to walk. Had been in physical therapy which was helpful but didn't continue exercises and now continues to have pain.     Weight: currently taking topiramate 75mg HS and has noticed a reduction in appetite. No SE reported. Not able to walk more than 15 minutes without stopping. Feels like she is limited with back and leg pain/weakness.    Diabetes:  Pt currently taking lantus 48u QHS, novolog 20u 2-3 times a day with meals, and trulicity 1.5mg subcutaneously each week.  States she had one day of taking 30u Novolog instead of 20u because BS was high, wondering if that was OK and what would happen if she took too much insulin.   Pt is not experiencing side effects.  SMBG: 3-4 times daily. Ranges (glucose log)  Date FBG/ 2hours post Lunch/2hours post Dinner /2hours post    9/6 137      9/5 110 283 327/187     100 212 /172     141  123/153     114  131/194     112  /189     115 130 /180       Patient is rarely experiencing hypoglycemia   Recent symptoms of high blood sugar? none  Eye exam: up to date  Foot exam: up to date  Microalbumin is < 30 mg/g. Pt is taking an ACEi/ARB.  Aspirin: Taking 81mg daily and denies side effects  Diet/Exercise:  Has been doing more walking, would like to lose weight  Lab Results   Component Value Date    A1C  6.4 08/06/2018    A1C 6.2 05/22/2018    A1C 6.8 02/12/2018    A1C 8.9 12/09/2017    A1C 8.9 11/07/2017     Depression/PTSD/schizoaffective: Currently taking invega 9mg every morning and benztropine 0.5mg BID, sertraline 200mg daily.    Reports symptoms as improved. Had been denied for social security and was feeling down but working on an appeal, feeling hopeful about this. Did not get job at [employer] and looking for other options.   Not sleeping well, not using CPAP. STates it's been a difficult month and hasn't wanted to try.   Side effects: TD has improved, no tongue thrusting noted but some slight movement to lower jaw.    Hyperlipidemia: Current therapy includes atorvastatin 80mg once daily.  Pt reports the following possible side effects: myalgias in upper legs     Vitamin D deficiency: no longer taking weekly vitamin D, unsure when stopped.   Lab Results   Component Value Date    VITDT 69 11/07/2017    VITDT 13 (L) 07/13/2016    VITDT (L) 04/19/2016     <13  Season, race, dietary intake, and treatment affect the concentration of   25-hydroxy-Vitamin D. Values may decrease during winter months and increase   during summer months. Values 20-29 ug/L may indicate Vitamin D insufficiency   and values <20 ug/L may indicate Vitamin D deficiency.   Vitamin D determination is routinely performed by an immunoassay specific for   25 hydroxyvitamin D3.  If an individual is on vitamin D2 (ergocalciferol)   supplementation, please specify 25 OH vitamin D2 and D3 level determination by   LCMSMS test [id number].      VITDT (L) 01/28/2013     <13  Season, race, dietary intake, and treatment affect the concentration of   25-hydroxy-Vitamin D. Values may decrease during winter months and increase   during summer months. Values less than 30 ug/L may indicate Vitamin D   deficiency.   Vitamin D determiniation is routinely performed by an immunoassay specific for   25 hydroxyvitamin D3.  If an individual is on vitamin D2  (ergocalciferol)   supplementation, please specify 25 OH vitamin D2 and D3 level determination by   LCMSMS test [id number].  For questions, please contact the laboratory at   [phone].         Today's Vitals:  BP Readings from Last 3 Encounters:   08/22/18 110/58   08/06/18 122/64   07/16/18 92/58        ASSESSMENT:              Current medications were reviewed today as discussed above.      Medication Adherence: good, no issues identified    Back pain: needs improvement. Encouraged restarting PT, increasing walking as tolerated, possible pool therapy. See PCP note for details. OK to increase apap to 1000mg BID if needed.     weight: needs improvement. No change. Room to increase topiramate; pt will f/u with bariatric clinic to further discuss.     Diabetes: stable. A1c at goal <7%.     Depression/PTSD/schizoaffective: improved    Hyperlipidemia: stable, myalgia is a possible side effect to statin vs deconditioning; will check CK and consider dose reduction or holding medication.      vitamin D deficiency: needs improvement, will recheck level     PLAN:                  Check CK and vit D level    I spent 30 minutes with this patient today. All changes were made via collaborative practice agreement with Rowena Haas. A copy of the visit note was provided to the patient's primary care provider.     Will follow up in 1 month.    The patient was given a summary of these recommendations as an after visit summary.    Eugenia De Jesus, PharmD, BCACP

## 2018-09-06 NOTE — PROGRESS NOTES
[facility]   September 6, 2018      Behavioral Health Clinician Progress Note    Patient Name: Nena Tang           Service Type:  Individual      Service Location:   Face to Face in Clinic     Session Start Time: 11:15am  Session End Time: 11:40am      Session Length: 16 - 37      Attendees: Patient, PCP and MTM    Visit Activities (Refresh list every visit): [facility] Covisit    Diagnostic Assessment Date: 1-23-18  Treatment Plan Review Date: Not completed yet  See Flowsheets for today's PHQ-9 and TAMIA-7 results  Previous PHQ-9:   PHQ-9 SCORE 1/2/2017 2/3/2017 3/13/2018   Total Score - - -   Total Score - - -   Total Score 0 0 14     Previous TAMIA-7:   TAMIA-7 SCORE 1/2/2017 2/3/2017 3/13/2018   Total Score - - -   Total Score 0 0 17   Total Score - - -       ALEXANDRA LEVEL:  ALEXANDRA Score (Last Two) 8/30/2011 6/9/2014   ALEXANDRA Raw Score 42 37   Activation Score 66 49.9   ALEXANDRA Level 3 2       DATA  Extended Session (60+ minutes): No  Interactive Complexity: No  Crisis: No  [facility] Patient: No    Treatment Objective(s) Addressed in This Session:  Target Behavior(s): disease management/lifestyle changes mental health    Depressed Mood: Increase interest, engagement, and pleasure in doing things  Decrease frequency and intensity of feeling down, depressed, hopeless  Improve quantity and quality of night time sleep / decrease daytime naps  Feel less tired and more energy during the day   Identify negative self-talk and behaviors: challenge core beliefs, myths, and actions  Improve concentration, focus, and mindfulness in daily activities   Decrease thoughts that you'd be better off dead or of suicide / self-harm  Anxiety: will experience a reduction in anxiety, will develop more effective coping skills to manage anxiety symptoms, will develop healthy cognitive patterns and beliefs and will increase ability to function adaptively  Alcohol / Substance Use: continue to make healthy choices regarding substance  "use and engage in activities / supportive services that promote sobriety  Thought Disturbance: will develop skills to more effectively manage symptoms, will develop more effective support systems and will continue to take medications as prescribed    Current Stressors / Issues:    [facility] met with patient at PCP request to assess current behavioral health needs and provide information and support as necessary.  Pt reported that she has been doing well. She stated that she does still have a cough which has been a challenge. Pt reported that her mood has been \"up and down\". Pt stated that she did not get the apartment and she had the hearing and they denied her again. Pt stated that that was difficult for her but she has accepted it and moved on. Pt stated that she went to visit her oldest son and they went [place] and \"I think I got heat stroke or something\". Pt stated that her son had to carry her after she started to get dizzy and urinated on herself. Pt and PCP discussed starting slower with walking and working up to walking longer distances. Pt and PCP discussed the importance of strengthening her muscles before doing too much. Pt reported that she is still going to the weight loss clinic as well. Pt was open to going to physical therapy as she recognized that it was helpful in terms of strengthening. Pt reported that she did not get the job that she applied for and stated \"bad luck has been coming my way\". Pt stated that she is upset but is going to keep looking.    Reviewed new and ongoing stressors  Reviewed mental health symptoms and appropriate coping mechanisms    I affirmed the steps this patient has taken to address physical and behavioral health issues, and offered continued behavioral health services or referral, now or in the future, as needed by the patient.    Progress on Treatment Objective(s) / Homework:  Minimal progress - ACTION (Actively working towards change); Intervened by " reinforcing change plan / affirming steps taken    Motivational Interviewing    MI Intervention: Expressed Empathy/Understanding, Supported Autonomy, Collaboration, Evocation, Permission to raise concern or advise, Open-ended questions, Reflections: simple and complex, Rolled with resistance: Emphasized patient autonomy, Simple reflection and Evoked patient agenda and Change talk (evoked)     Change Talk Expressed by the Patient: Desire to change Ability to change Reasons to change Need to change Committment to change Activation Taking steps    Provider Response to Change Talk: E - Evoked more info from patient about behavior change, A - Affirmed patient's thoughts, decisions, or attempts at behavior change, R - Reflected patient's change talk and S - Summarized patient's change talk statements      Care Plan review completed: No    Medication Review:  No changes to current psychiatric medication(s)   Medication Compliance:  NA    Changes in Health Issues:   None reported     Chemical Use Review:   Substance Use: Chemical use reviewed, no active concerns identified      Tobacco Use: No current tobacco use.      Assessment: Current Emotional / Mental Status (status of significant symptoms):  Risk status (Self / Other harm or suicidal ideation)  Patient has had a history of suicidal ideation: yes  Patient denies current fears or concerns for personal safety.  Patient denies current or recent suicidal ideation or behaviors.  Patient denies current or recent homicidal ideation or behaviors.  Patient denies current or recent self injurious behavior or ideation.  Patient denies other safety concerns.  A safety and risk management plan has not been developed at this time, however patient was encouraged to call [facility] should there be a change in any of these risk factors.    Appearance:   Appropriate   Eye Contact:   Good   Psychomotor Behavior: Normal   Attitude:   Cooperative    Orientation:   All  Speech   Rate / Production: Normal    Volume:  Normal   Mood:    Normal  Affect:    Appropriate  Bright  Worrisome   Thought Content:  Clear   Thought Form:  Coherent   Insight:    Good     Diagnoses:  1. Schizoaffective disorder, depressive type (H)        Collateral Reports Completed:  Not Applicable    Plan: (Homework, other):  Patient was given information about behavioral services and encouraged to schedule a follow up appointment with the clinic [facility] as needed.  She was also given information about mental health symptoms and treatment options .  CD Recommendations: Maintain Sobriety.    BIN Reyes, [facility]      ______________________________________________________________________

## 2018-09-06 NOTE — MR AVS SNAPSHOT
After Visit Summary   9/6/2018    Nena Tang    MRN: [id number]           Patient Information     Date Of Birth          1960        Visit Information        Provider Department      9/6/2018 11:00 AM Eugenia De Jesus [facility]        Today's Diagnoses     Hyperlipidemia LDL goal <100    -  1    Pain        Obesity, unspecified obesity severity, unspecified obesity type        Type 2 diabetes mellitus with hyperglycemia, with long-term current use of insulin (H)        Schizoaffective disorder, depressive type (H)        Nutritional deficiency          Care Instructions    See AVS from PCP encounter for details           Follow-ups after your visit        Your next 10 appointments already scheduled     Sep 10, 2018 10:45 AM CDT   (Arrive by 10:30 AM)   Return Weight Management Visit with Debbie Germain PA-C   [facility] ([facility])    [address]  [address] [facility] [address]   [phone]            Sep 11, 2018  3:00 PM CDT   Return Visit with Richardson Lopez [facility] [place] [facility] ([facility]    [address]  [address]  [facility] [address]   [phone]            Sep 17, 2018  2:45 PM CDT   RETURN RETINA with Tibucrio Chacon MD   [facility] ([facility])    [address]  [facility] [address]   [phone]            Oct 05, 2018  2:00 PM CDT   Return Visit with ART Arias [facility] ([facility]    [address]  [address]  [facility] [address]   [phone]            Oct 05, 2018  2:00 PM CDT   [place] [facility] Lopez [facility]    [address]  [address]  [place] [address] [phone]            Nov 30, 2018  1:30 PM CST   Return Visit with Kraig Pedersen MD   [facility] ([facility]    [address]  [place] [address] [phone]              Future tests that were ordered for you today     Open Future Orders        Priority Expected Expires Ordered    CT Lumbar Spine w/o Contrast Routine  9/6/2019 9/6/2018            Who to contact     If you have questions or need follow up information about today's clinic visit or your schedule please contact [facility] directly at [phone].  Normal or non-critical lab and imaging results will be communicated to you by ENEFprohart, letter or phone within 4 business days after the clinic has received the results. If you do not hear from us within 7 days, please contact the clinic through ENEFprohart or phone. If you have a critical or abnormal lab result, we will notify you by phone as soon as possible.  Submit refill requests through CogniK or call your pharmacy and they will forward the refill request to us. Please allow 3 business days for your refill to be completed.          Additional Information About Your Visit        MyChart Information     CogniK gives you secure access to your electronic health record. If you see a primary care provider, you can also send messages to your care team and make appointments. If you have questions, please call your primary care clinic.  If you do not have a primary care provider, please call [phone] and they will assist you.        Care EveryWhere ID     This is your Care EveryWhere ID. This could be used by other organizations to access your [facility] medical records  [id number]        Your Vitals Were     Last Period                   01/06/2015            Blood Pressure from Last 3 Encounters:   08/22/18 110/58   08/06/18 122/64   07/16/18 92/58     Weight from Last 3 Encounters:   09/06/18 240 lb (108.9 kg)   08/22/18 239 lb (108.4 kg)   08/06/18 238 lb (108 kg)              Today, you had the following     No orders found for display         Today's Medication Changes          These changes are accurate as of 9/6/18  4:36 PM.  If you have any questions, ask your nurse or doctor.               Start taking these medicines.        Dose/Directions    guaiFENesin 600 MG 12 hr tablet   Commonly known as:  MUCINEX   Used for:  Cough   Started by:  Rowena Haas APRN CNP        Dose:  600 mg   Take 1 tablet (600 mg) by mouth 2 times daily as needed for congestion   Quantity:  20 tablet   Refills:  0            Where to get your medicines      These medications were sent to [facility] [address]  [address], [facility] [address]     Phone:  [phone]     guaiFENesin 600 MG 12 hr tablet         Some of these will need a paper prescription and others can be bought over the counter.  Ask your nurse if you have questions.     Bring a paper prescription for each of these medications     guaiFENesin-codeine 100-10 MG/5ML Soln solution                Primary Care Provider Office Phone # Fax #    ART Arias [phone]       [address]  [facility] [address]        Goals        General    Medical (pt-stated)     Notes - Note created  7/7/2016  9:15 AM by Chelsea Pulido, RN    Get blood sugars under control    Improve medication compliance    As of today's date 7/7/2016 goal is met at 0 - 25%.   Goal Status:  Active          Equal Access to Services     [facility]: Hadii samira lancaster Sodelphine, waaxda luqadaha, qaybta kaalmada hossein, lorena martins. So [facility] [phone].    ATENCIÓN: Si habla español, tiene a trinh disposición servicios gratuitos de asistencia lingüística. Llame al [phone].    We comply with applicable [facility] civil  rights laws and [place] laws. We do not discriminate on the basis of race, color, national origin, age, disability, sex, sexual orientation, or gender identity.            Thank you!     Thank you for choosing [facility]  for your care. Our goal is always to provide you with excellent care. Hearing back from our patients is one way we can continue to improve our services. Please take a few minutes to complete the written survey that you may receive in the mail after your visit with us. Thank you!             Your Updated Medication List - Protect others around you: Learn how to safely use, store and throw away your medicines at www.disposemymeds.org.          This list is accurate as of 9/6/18  4:36 PM.  Always use your most recent med list.                   Brand Name Dispense Instructions for use Diagnosis    acetaminophen 500 MG tablet    TYLENOL    100 tablet    Take 2 tablets (1,000 mg) by mouth 3 times daily as needed for mild pain    Chronic low back pain, unspecified back pain laterality, with sciatica presence unspecified       albuterol 108 (90 Base) MCG/ACT inhaler    PROAIR HFA/PROVENTIL HFA/VENTOLIN HFA    1 Inhaler    Inhale 2 puffs into the lungs every 6 hours as needed for shortness of breath / dyspnea or wheezing    Pneumonia, organism unspecified(486)       aspirin 81 MG EC tablet     28 tablet    TAKE 1 TABLET (81MG) BY MOUTH DAILY    Coronary artery disease involving native heart with angina pectoris, unspecified vessel or lesion type (H)       atorvastatin 80 MG tablet    LIPITOR    30 tablet    TAKE 1 TABLET (80MG) BY MOUTH DAILY    Hyperlipidemia LDL goal <100       benztropine 0.5 MG tablet    COGENTIN    60 tablet    Take 1 tablet (0.5 mg) by mouth 2 times daily    Extrapyramidal and movement disorder       blood glucose monitoring lancets     150 each    Use to test blood sugar 2 times daily or as directed.  Ok to substitute alternative if insurance  prefers.    Type 2 diabetes mellitus with diabetic neuropathy, with long-term current use of insulin (H)       blood glucose monitoring test strip    ONETOUCH VERIO IQ    200 strip    Use to test blood sugar 4 times daily .  Ok to substitute alternative if insurance prefers.    Type 2 diabetes mellitus with diabetic neuropathy, with long-term current use of insulin (H)       calcium carbonate 600 mg {elemental} 1500 (600 Ca) MG tablet    OS-ALLEN    180 tablet    Take 1 tablet (1,500 mg) by mouth daily    Other osteoporosis without current pathological fracture       clotrimazole 1 % cream    LOTRIMIN     Apply topically 2 times daily        DIPHENDRYL PO      Take 25 mg by mouth every 6 hours as needed for itching        dulaglutide 1.5 MG/0.5ML pen    TRULICITY    2 mL    Inject 1.5 mg Subcutaneous every 7 days    Type 2 diabetes mellitus with mild nonproliferative retinopathy without macular edema, with long-term current use of insulin, unspecified laterality (H)       fluticasone 50 MCG/ACT spray    FLONASE    1 Bottle    Spray 1-2 sprays into both nostrils daily    Seasonal allergic rhinitis, unspecified chronicity, unspecified trigger       gabapentin 300 MG capsule    NEURONTIN    168 capsule    TAKE 2 CAPSULES (600MG) BY MOUTH THREE TIMES A DAY    Type 2 diabetes mellitus with diabetic neuropathy, with long-term current use of insulin (H)       glucose 4 g Chew chewable tablet     20 tablet    Take 2 every 15 minutes for blood sugar <70mg/dL. Recheck blood sugar every 15 minutes until above 70mg/dL, then eat a substantial meal.    Type 2 diabetes mellitus with mild nonproliferative retinopathy without macular edema, with long-term current use of insulin, unspecified laterality (H)       guaiFENesin 600 MG 12 hr tablet    MUCINEX    20 tablet    Take 1 tablet (600 mg) by mouth 2 times daily as needed for congestion    Cough       guaiFENesin-codeine 100-10 MG/5ML Soln solution    ROBITUSSIN AC    120 mL    Take  5 mLs by mouth every 6 hours as needed for cough    Cough       ibuprofen 600 MG tablet    ADVIL/MOTRIN    60 tablet    Take 1 tablet (600 mg) by mouth every 4 hours as needed for moderate pain    Closed fracture of one rib of right side, initial encounter       insulin aspart 100 UNIT/ML injection    NovoLOG FLEXPEN    15 mL    Inject 20 Units Subcutaneous 3 times daily (with meals) Once daily, can add additional 5 units if BGs are >500mg/dL.    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, with long-term current use of insulin, unspecified laterality (H)       insulin pen needle 30G X 8 MM    NOVOFINE 30    400 each    USE 4 DAILY OR AS DIRECTED    Type 2 diabetes mellitus with mild nonproliferative retinopathy without macular edema, with long-term current use of insulin, unspecified laterality (H)       ipratropium - albuterol 0.5 mg/2.5 mg/3 mL 0.5-2.5 (3) MG/3ML neb solution    DUONEB    30 vial    Take 1 vial (3 mLs) by nebulization every 6 hours as needed for shortness of breath / dyspnea or wheezing    Wheezing       losartan 100 MG tablet    COZAAR    28 tablet    TAKE 1 TABLET (100MG) BY MOUTH DAILY    Coronary artery disease involving native heart with angina pectoris, unspecified vessel or lesion type (H)       melatonin 5 MG Caps     180 capsule    Take 2 capsules by mouth daily At dinnertime    Insomnia, unspecified type       metoprolol succinate 25 MG 24 hr tablet    TOPROL-XL    30 tablet    Take 1 tablet (25 mg) by mouth daily    Coronary artery disease involving native heart with angina pectoris, unspecified vessel or lesion type (H)       * order for DME     1 each    Hold Novolog if meals are  refused.    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    Generalized muscle weakness       order for DME     1 Units    Equipment being ordered: Nebulizer Machine    Wheezing, Cough, Pneumonia due to infectious organism, unspecified laterality, unspecified part of lung       paliperidone 9 MG 24 hr tablet    INVEGA    30 tablet    Take 1 tablet (9 mg) by mouth every morning    Schizoaffective disorder, bipolar type (H)       polyethylene glycol powder    MIRALAX/GLYCOLAX    527 g    TAKE 8.5 GRAMS (1/2 CAPFUL) BY MOUTH DAILY    Constipation, unspecified constipation type       prochlorperazine 5 MG tablet    COMPAZINE    90 tablet    Take 1 tablet (5 mg) by mouth every 6 hours as needed for nausea or vomiting    Nausea       ranitidine 300 MG tablet    ZANTAC    90 tablet    TAKE 1 TABLET (300MG) BY MOUTH AT BEDTIME    Gastroesophageal reflux disease without esophagitis       senna-docusate 8.6-50 MG per tablet    SENOKOT-S;PERICOLACE    100 tablet    Take 1 tablet by mouth 2 times daily as needed for constipation    Drug-induced constipation       sertraline 100 MG tablet    ZOLOFT    60 tablet    Take 2 tablets (200 mg) by mouth daily    Schizoaffective disorder, bipolar type (H)       SUMAtriptan 25 MG tablet    IMITREX    12 tablet    Take 1-2 tablets (25-50 mg) by mouth at onset of headache for migraine May repeat in 2 hours. Max 8 tablets/24 hours.    Migraine with aura and without status migrainosus, not intractable       topiramate 25 MG tablet    TOPAMAX    90 tablet    25mg at bedtime for week 1,  50mg at bedtime for 1 week, and 75mg at bedtime thereafter    Morbid obesity (H)       vitamin D3 72808 UNITS capsule    CHOLECALCIFEROL    12 capsule    TAKE 1 CAPSULE (50,000 UNITS) MONTHLY    Vitamin D deficiency       * Notice:  This list has 2 medication(s) that are the same as other medications prescribed for you. Read the directions carefully, and ask your doctor or other care provider to review them with you.

## 2018-09-06 NOTE — MR AVS SNAPSHOT
After Visit Summary   9/6/2018    Nena Tang    MRN: [id number]           Patient Information     Date Of Birth          1960        Visit Information        Provider Department      9/6/2018 11:00 AM Rowena Haas APRN CNP [facility]        Today's Diagnoses     Muscle weakness (generalized)    -  1    Screening for HIV (human immunodeficiency virus)        Screening for diabetic peripheral neuropathy        Type 2 diabetes mellitus with stage 3 chronic kidney disease, with long-term current use of insulin (H)        Cough        Vitamin D deficiency          Care Instructions    -Take tylenol in the morning and at bedtime to help with the back pain.   -Will be called about scheduling for Back CT.   -Physical therapy ordered.   -Labs today.           Follow-ups after your visit        Additional Services     PHYSICAL THERAPY REFERRAL       *This therapy referral will be filtered to a centralized scheduling office at [facility] and the patient will receive a call to schedule an appointment at a [place] location most convenient for them. *     [facility] provides Physical Therapy evaluation and treatment and many specialty services across the [place] system.  If requesting a specialty program, please choose from the list below.    If you have not heard from the scheduling office within 2 business days, please call [phone] for all locations, with the exception of [place], please call [phone] and [facility], please call [phone]  Treatment: Evaluation & Treatment  Special Instructions/Modalities: None.   Special Programs: None    Please be aware that coverage of these services is subject to the terms and limitations of your health insurance plan.  Call member services at your health plan with any benefit or coverage questions.      **Note to Provider:  If you are referring outside of [facility] [place] for the  "therapy appointment, please list the name of the location in the \"special instructions\" above, print the referral and give to the patient to schedule the appointment.                  Follow-up notes from your care team     Return in about 4 weeks (around 10/4/2018) for Diabetes, Chronic pain, Med check.      Your next 10 appointments already scheduled     Sep 10, 2018 10:45 AM CDT   (Arrive by 10:30 AM)   Return Weight Management Visit with Debbie Germain PA-C   [facility] ([facility])    [address]  4th Floor  [place] [address]   [phone]            Sep 11, 2018  3:00 PM CDT   Return Visit with Richardson Lopez [facility] ([facility])    [address]  [address]  [place] [address] [phone]            Sep 17, 2018  2:45 PM CDT   RETURN RETINA with Tiburcio Chacon MD   [facility] ([facility])    [address]  [address]  [place] [address]   [phone]            Nov 30, 2018  1:30 PM CST   Return Visit with Kraig Pedersen MD   [facility] ([facility])    [address]  [place] [address] [phone]              Future tests that were ordered for you today     Open Future Orders        Priority Expected Expires Ordered    CT Lumbar Spine w/o Contrast Routine  9/6/2019 9/6/2018            Who to contact     If you have questions or need follow up information about today's clinic visit or your schedule please contact [facility] directly at [phone].  Normal or non-critical lab and imaging results will be communicated to you by MyChart, letter or phone within 4 business days after the clinic has received the results. If you do not hear from us within 7 days, please contact the clinic " through [employer] or phone. If you have a critical or abnormal lab result, we will notify you by phone as soon as possible.  Submit refill requests through [employer] or call your pharmacy and they will forward the refill request to us. Please allow 3 business days for your refill to be completed.          Additional Information About Your Visit        [employer] Information     [employer] gives you secure access to your electronic health record. If you see a primary care provider, you can also send messages to your care team and make appointments. If you have questions, please call your primary care clinic.  If you do not have a primary care provider, please call [phone] and they will assist you.        Care EveryWhere ID     This is your Care EveryWhere ID. This could be used by other organizations to access your [place] medical records  [id number]        Your Vitals Were     Pulse Temperature Respirations Last Period Pulse Oximetry BMI (Body Mass Index)    80 98.4  F (36.9  C) (Oral) 18 01/06/2015 96% 45.9 kg/m2       Blood Pressure from Last 3 Encounters:   08/22/18 110/58   08/06/18 122/64   07/16/18 92/58    Weight from Last 3 Encounters:   09/06/18 240 lb (108.9 kg)   08/22/18 239 lb (108.4 kg)   08/06/18 238 lb (108 kg)              We Performed the Following     Albumin Random Urine Quantitative with Creat Ratio     CK total     FOOT EXAM  NO CHARGE [78939.114]     HIV Screening     PHYSICAL THERAPY REFERRAL     Vitamin D deficiency screening          Where to get your medicines      Some of these will need a paper prescription and others can be bought over the counter.  Ask your nurse if you have questions.     Bring a paper prescription for each of these medications     guaiFENesin-codeine 100-10 MG/5ML Soln solution          Primary Care Provider Office Phone # Fax #    ART Arias [phone]       [address] [place], [place] [address]E [facility] [address]  [facility] [address]        [facility]        General     Medical (pt-stated)     Notes - Note created  7/7/2016  9:15 AM by Chelsea Pulido, RN    Get blood sugars under control    Improve medication compliance    As of today's date 7/7/2016 goal is met at 0 - 25%.   Goal Status:  Active          Equal Access to Services     RAMESH GARRIDO : Hadii aad ku hadpeeo Soomaali, waaxda luqadaha, qaybta kaalmada adeegyada, waxmichelle dorcas eddiefredi jeffersonmonica johnson laprudencefredi . So [facility] [phone].    ATENCIÓN: Si habla español, tiene a trinh disposición servicios gratuitos de asistencia lingüística. Llame al [phone].    We comply with applicable federal civil rights laws and [place] laws. We do not discriminate on the basis of race, color, national origin, age, disability, sex, sexual orientation, or gender identity.            Thank you!     Thank you for choosing [facility]  for your care. Our goal is always to provide you with excellent care. Hearing back from our patients is one way we can continue to improve our services. Please take a few minutes to complete the written survey that you may receive in the mail after your visit with us. Thank you!             Your Updated Medication List - Protect others around you: Learn how to safely use, store and throw away your medicines at www.disposemymeds.org.          This list is accurate as of 9/6/18 11:56 AM.  Always use your most recent med list.                   Brand Name Dispense Instructions for use Diagnosis    acetaminophen 500 MG tablet    TYLENOL    100 tablet    Take 2 tablets (1,000 mg) by mouth 3 times daily as needed for mild pain    Chronic low back pain, unspecified back pain laterality, with sciatica presence unspecified       albuterol 108 (90 Base) MCG/ACT inhaler    PROAIR HFA/PROVENTIL HFA/VENTOLIN HFA    1 Inhaler    Inhale 2 puffs into the lungs every 6 hours as needed for shortness of breath / dyspnea or wheezing    Pneumonia, organism unspecified(486)       aspirin 81 MG EC tablet      28 tablet    TAKE 1 TABLET (81MG) BY MOUTH DAILY    Coronary artery disease involving native heart with angina pectoris, unspecified vessel or lesion type (H)       atorvastatin 80 MG tablet    LIPITOR    30 tablet    TAKE 1 TABLET (80MG) BY MOUTH DAILY    Hyperlipidemia LDL goal <100       benztropine 0.5 MG tablet    COGENTIN    60 tablet    Take 1 tablet (0.5 mg) by mouth 2 times daily    Extrapyramidal and movement disorder       blood glucose monitoring lancets     150 each    Use to test blood sugar 2 times daily or as directed.  Ok to substitute alternative if insurance prefers.    Type 2 diabetes mellitus with diabetic neuropathy, with long-term current use of insulin (H)       blood glucose monitoring test strip    ONETOUCH VERIO IQ    200 strip    Use to test blood sugar 4 times daily .  Ok to substitute alternative if insurance prefers.    Type 2 diabetes mellitus with diabetic neuropathy, with long-term current use of insulin (H)       calcium carbonate 600 mg {elemental} 1500 (600 Ca) MG tablet    OS-ALLEN    180 tablet    Take 1 tablet (1,500 mg) by mouth daily    Other osteoporosis without current pathological fracture       clotrimazole 1 % cream    LOTRIMIN     Apply topically 2 times daily        DIPHENDRYL PO      Take 25 mg by mouth every 6 hours as needed for itching        dulaglutide 1.5 MG/0.5ML pen    TRULICITY    2 mL    Inject 1.5 mg Subcutaneous every 7 days    Type 2 diabetes mellitus with mild nonproliferative retinopathy without macular edema, with long-term current use of insulin, unspecified laterality (H)       fluticasone 50 MCG/ACT spray    FLONASE    1 Bottle    Spray 1-2 sprays into both nostrils daily    Seasonal allergic rhinitis, unspecified chronicity, unspecified trigger       gabapentin 300 MG capsule    NEURONTIN    168 capsule    TAKE 2 CAPSULES (600MG) BY MOUTH THREE TIMES A DAY    Type 2 diabetes mellitus with diabetic neuropathy, with long-term current use of  insulin (H)       glucose 4 g Chew chewable tablet     20 tablet    Take 2 every 15 minutes for blood sugar <70mg/dL. Recheck blood sugar every 15 minutes until above 70mg/dL, then eat a substantial meal.    Type 2 diabetes mellitus with mild nonproliferative retinopathy without macular edema, with long-term current use of insulin, unspecified laterality (H)       guaiFENesin-codeine 100-10 MG/5ML Soln solution    ROBITUSSIN AC    120 mL    Take 5 mLs by mouth every 6 hours as needed for cough    Cough       ibuprofen 600 MG tablet    ADVIL/MOTRIN    60 tablet    Take 1 tablet (600 mg) by mouth every 4 hours as needed for moderate pain    Closed fracture of one rib of right side, initial encounter       insulin aspart 100 UNIT/ML injection    NovoLOG FLEXPEN    15 mL    Inject 20 Units Subcutaneous 3 times daily (with meals) Once daily, can add additional 5 units if BGs are >500mg/dL.    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, with long-term current use of insulin, unspecified laterality (H)       insulin pen needle 30G X 8 MM    NOVOFINE 30    400 each    USE 4 DAILY OR AS DIRECTED    Type 2 diabetes mellitus with mild nonproliferative retinopathy without macular edema, with long-term current use of insulin, unspecified laterality (H)       ipratropium - albuterol 0.5 mg/2.5 mg/3 mL 0.5-2.5 (3) MG/3ML neb solution    DUONEB    30 vial    Take 1 vial (3 mLs) by nebulization every 6 hours as needed for shortness of breath / dyspnea or wheezing    Wheezing       losartan 100 MG tablet    COZAAR    28 tablet    TAKE 1 TABLET (100MG) BY MOUTH DAILY    Coronary artery disease involving native heart with angina  pectoris, unspecified vessel or lesion type (H)       melatonin 5 MG Caps     180 capsule    Take 2 capsules by mouth daily At dinnertime    Insomnia, unspecified type       metoprolol succinate 25 MG 24 hr tablet    TOPROL-XL    30 tablet    Take 1 tablet (25 mg) by mouth daily    Coronary artery disease involving native heart with angina pectoris, unspecified vessel or lesion type (H)       * order for DME     1 each    Hold Novolog if meals are refused.    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    Generalized muscle weakness       order for DME     1 Units    Equipment being ordered: Nebulizer Machine    Wheezing, Cough, Pneumonia due to infectious organism, unspecified laterality, unspecified part of lung       paliperidone 9 MG 24 hr tablet    INVEGA    30 tablet    Take 1 tablet (9 mg) by mouth every morning    Schizoaffective disorder, bipolar type (H)       polyethylene glycol powder    MIRALAX/GLYCOLAX    527 g    TAKE 8.5 GRAMS (1/2 CAPFUL) BY MOUTH DAILY    Constipation, unspecified constipation type       prochlorperazine 5 MG tablet    COMPAZINE    90 tablet    Take 1 tablet (5 mg) by mouth every 6 hours as needed for nausea or vomiting    Nausea       ranitidine 300 MG tablet    ZANTAC    90 tablet    TAKE 1 TABLET (300MG) BY MOUTH AT BEDTIME    Gastroesophageal reflux disease without esophagitis       senna-docusate 8.6-50 MG per tablet    SENOKOT-S;PERICOLACE    100 tablet    Take 1 tablet by mouth 2 times daily as needed for constipation    Drug-induced constipation       sertraline 100  MG tablet    ZOLOFT    60 tablet    Take 2 tablets (200 mg) by mouth daily    Schizoaffective disorder, bipolar type (H)       SUMAtriptan 25 MG tablet    IMITREX    12 tablet    Take 1-2 tablets (25-50 mg) by mouth at onset of headache for migraine May repeat in 2 hours. Max 8 tablets/24 hours.    Migraine with aura and without status migrainosus, not intractable       topiramate 25 MG tablet    TOPAMAX    90 tablet    25mg at bedtime for week 1, 50mg at bedtime for 1 week, and 75mg at bedtime thereafter    Morbid obesity (H)       vitamin D3 21341 UNITS capsule    CHOLECALCIFEROL    12 capsule    TAKE 1 CAPSULE (50,000 UNITS) MONTHLY    Vitamin D deficiency       * Notice:  This list has 2 medication(s) that are the same as other medications prescribed for you. Read the directions carefully, and ask your doctor or other care provider to review them with you.

## 2018-09-06 NOTE — MR AVS SNAPSHOT
After Visit Summary   9/6/2018    Nena Tang    MRN: [id number]           Patient Information     Date Of Birth          1960        Visit Information        Provider Department      9/6/2018 11:00 AM Yessenia Gorman [facility]        Today's Diagnoses     Schizoaffective disorder, depressive type (H)    -  1       Follow-ups after your visit        Your next 10 appointments already scheduled     Sep 10, 2018 10:45 AM CDT   (Arrive by 10:30 AM)   Return Weight Management Visit with Debbie Germain PA-C   [facility] ([facility])    [address]  4th [address]  [facility] [address]   [phone]            Sep 11, 2018  3:00 PM CDT   Return Visit with Richardson Lopez [facility] ([facility])    [address]  [address]  [facility] [address]   [phone]            Sep 17, 2018  2:45 PM CDT   RETURN RETINA with Tiburcio Chacon MD   [facility] ([facility])    [address]  [address]  [facility] [address]   [phone]            Oct 05, 2018  2:00 PM CDT   Return Visit with ART Arias [facility] ([facility])    [address]  [address]  [facility] [address]   [phone]            Oct 05, 2018  2:00 PM CDT   Return Visit with Richardson Lopez [facility] ([facility])    [address]  [address]  [facility] [address]   [phone]            Nov 30, 2018  1:30 PM CST   Return Visit with Kraig Pedersen MD   [facility] ([facility])    [address]  [facility] [address] [phone]              Future tests that were  ordered for you today     Open Future Orders        Priority Expected Expires Ordered    CT Lumbar Spine w/o Contrast Routine  9/6/2019 9/6/2018            Who to contact     If you have questions or need follow up information about today's clinic visit or your schedule please contact [facility] directly at [phone].  Normal or non-critical lab and imaging results will be communicated to you by MyChart, letter or phone within 4 business days after the clinic has received the results. If you do not hear from us within 7 days, please contact the clinic through [employer] or phone. If you have a critical or abnormal lab result, we will notify you by phone as soon as possible.  Submit refill requests through [employer] or call your pharmacy and they will forward the refill request to us. Please allow 3 business days for your refill to be completed.          Additional Information About Your Visit        [employer] Information     [employer] gives you secure access to your electronic health record. If you see a primary care provider, you can also send messages to your care team and make appointments. If you have questions, please call your primary care clinic.  If you do not have a primary care provider, please call [phone] and they will assist you.        Care EveryWhere ID     This is your Care EveryWhere ID. This could be used by other organizations to access your [facility] medical records  [id number]        Your Vitals Were     Last Period                   01/06/2015            Blood Pressure from Last 3 Encounters:   08/22/18 110/58   08/06/18 122/64   07/16/18 92/58    Weight from Last 3 Encounters:   09/06/18 240 lb (108.9 kg)   08/22/18 239 lb (108.4 kg)   08/06/18 238 lb (108 kg)              Today, you had the following     No orders found for display         Today's Medication Changes          These changes are accurate as of 9/6/18  2:04 PM.  If you have any questions, ask your  nurse or doctor.               Start taking these medicines.        Dose/Directions    guaiFENesin 600 MG 12 hr tablet   Commonly known as:  MUCINEX   Used for:  Cough   Started by:  Rowena Haas APRN CNP        Dose:  600 mg   Take 1 tablet (600 mg) by mouth 2 times daily as needed for congestion   Quantity:  20 tablet   Refills:  0            Where to get your medicines      These medications were sent to [facility] [address]  [address], [facility] [address]     Phone:  [phone]     guaiFENesin 600 MG 12 hr tablet         Some of these will need a paper prescription and others can be bought over the counter.  Ask your nurse if you have questions.     Bring a paper prescription for each of these medications     guaiFENesin-codeine 100-10 MG/5ML Soln solution                Primary Care Provider Office Phone # Fax #    ART Arias [phone]       [address]  [facility] [address]        Goals        General    Medical (pt-stated)     Notes - Note created  7/7/2016  9:15 AM by Chelsea Pulido, RN    Get blood sugars under control    Improve medication compliance    As of today's date 7/7/2016 goal is met at 0 - 25%.   Goal Status:  Active          Equal Access to Services     KIMBERLY GARRIDO : Hadii samira shane hadasho Soomaali, waaxda luqadaha, qaybta kaalmada adeegyada, lorena toscano hayfernando ellison . So [facility] [phone].    ATENCIÓN: Si habla español, tiene a trinh disposición servicios gratuitos de asistencia lingüística. Llame al [phone].    We comply with applicable federal civil rights laws and [place] laws. We do not discriminate on the basis of race, color, national origin, age, disability, sex, sexual orientation, or gender identity.            Thank you!     Thank you for choosing [facility]  for your care. Our goal is always to provide you with excellent care.  Hearing back from our patients is one way we can continue to improve our services. Please take a few minutes to complete the written survey that you may receive in the mail after your visit with us. Thank you!             Your Updated Medication List - Protect others around you: Learn how to safely use, store and throw away your medicines at www.disposemymeds.org.          This list is accurate as of 9/6/18  2:04 PM.  Always use your most recent med list.                   Brand Name Dispense Instructions for use Diagnosis    acetaminophen 500 MG tablet    TYLENOL    100 tablet    Take 2 tablets (1,000 mg) by mouth 3 times daily as needed for mild pain    Chronic low back pain, unspecified back pain laterality, with sciatica presence unspecified       albuterol 108 (90 Base) MCG/ACT inhaler    PROAIR HFA/PROVENTIL HFA/VENTOLIN HFA    1 Inhaler    Inhale 2 puffs into the lungs every 6 hours as needed for shortness of breath / dyspnea or wheezing    Pneumonia, organism unspecified(486)       aspirin 81 MG EC tablet     28 tablet    TAKE 1 TABLET (81MG) BY MOUTH DAILY    Coronary artery disease involving native heart with angina pectoris, unspecified vessel or lesion type (H)       atorvastatin 80 MG tablet    LIPITOR    30 tablet    TAKE 1 TABLET (80MG) BY MOUTH DAILY    Hyperlipidemia LDL goal <100       benztropine 0.5 MG tablet    COGENTIN    60 tablet    Take 1 tablet (0.5 mg) by mouth 2 times daily    Extrapyramidal and movement disorder       blood glucose monitoring lancets     150 each    Use to test blood sugar 2 times daily or as directed.  Ok to substitute alternative if insurance prefers.    Type 2 diabetes mellitus with diabetic neuropathy, with long-term current use of insulin (H)       blood glucose monitoring test strip    ONETOUCH VERIO IQ    200 strip    Use to test blood sugar 4 times daily .  Ok to substitute alternative if insurance prefers.    Type 2 diabetes mellitus with diabetic neuropathy,  with long-term current use of insulin (H)       calcium carbonate 600 mg {elemental} 1500 (600 Ca) MG tablet    OS-ALLEN    180 tablet    Take 1 tablet (1,500 mg) by mouth daily    Other osteoporosis without current pathological fracture       clotrimazole 1 % cream    LOTRIMIN     Apply topically 2 times daily        DIPHENDRYL PO      Take 25 mg by mouth every 6 hours as needed for itching        dulaglutide 1.5 MG/0.5ML pen    TRULICITY    2 mL    Inject 1.5 mg Subcutaneous every 7 days    Type 2 diabetes mellitus with mild nonproliferative retinopathy without macular edema, with long-term current use of insulin, unspecified laterality (H)       fluticasone 50 MCG/ACT spray    FLONASE    1 Bottle    Spray 1-2 sprays into both nostrils daily    Seasonal allergic rhinitis, unspecified chronicity, unspecified trigger       gabapentin 300 MG capsule    NEURONTIN    168 capsule    TAKE 2 CAPSULES (600MG) BY MOUTH THREE TIMES A DAY    Type 2 diabetes mellitus with diabetic neuropathy, with long-term current use of insulin (H)       glucose 4 g Chew chewable tablet     20 tablet    Take 2 every 15 minutes for blood sugar <70mg/dL. Recheck blood sugar every 15 minutes until above 70mg/dL, then eat a substantial meal.    Type 2 diabetes mellitus with mild nonproliferative retinopathy without macular edema, with long-term current use of insulin, unspecified laterality (H)       guaiFENesin 600 MG 12 hr tablet    MUCINEX    20 tablet    Take 1 tablet (600 mg) by mouth 2 times daily as needed for congestion    Cough       guaiFENesin-codeine 100-10 MG/5ML Soln solution    ROBITUSSIN AC    120 mL    Take 5 mLs by mouth every 6 hours as needed for cough    Cough       ibuprofen 600 MG tablet    ADVIL/MOTRIN    60 tablet    Take 1 tablet (600 mg) by mouth every 4 hours as needed for moderate pain    Closed fracture of one rib of right side, initial encounter       insulin aspart 100 UNIT/ML injection    NovoLOG FLEXPEN    15 mL     Inject 20 Units Subcutaneous 3 times daily (with meals) Once daily, can add additional 5 units if BGs are >500mg/dL.    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, with long-term current use of insulin, unspecified laterality (H)       insulin pen needle 30G X 8 MM    NOVOFINE 30    400 each    USE 4 DAILY OR AS DIRECTED    Type 2 diabetes mellitus with mild nonproliferative retinopathy without macular edema, with long-term current use of insulin, unspecified laterality (H)       ipratropium - albuterol 0.5 mg/2.5 mg/3 mL 0.5-2.5 (3) MG/3ML neb solution    DUONEB    30 vial    Take 1 vial (3 mLs) by nebulization every 6 hours as needed for shortness of breath / dyspnea or wheezing    Wheezing       losartan 100 MG tablet    COZAAR    28 tablet    TAKE 1 TABLET (100MG) BY MOUTH DAILY    Coronary artery disease involving native heart with angina pectoris, unspecified vessel or lesion type (H)       melatonin 5 MG Caps     180 capsule    Take 2 capsules by mouth daily At dinnertime    Insomnia, unspecified type       metoprolol succinate 25 MG 24 hr tablet    TOPROL-XL    30 tablet    Take 1 tablet (25 mg) by mouth daily    Coronary artery disease involving native heart with angina pectoris, unspecified vessel or lesion type (H)       * order for DME     1 each    Hold Novolog if meals are refused.    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, with long-term current use  of insulin, unspecified laterality (H)       order for DME     1 each    Equipment being ordered: 4 Wheeled walker with seat and brakes.    Generalized muscle weakness       order for DME     1 Units    Equipment being ordered: Nebulizer Machine    Wheezing, Cough, Pneumonia due to infectious organism, unspecified laterality, unspecified part of lung       paliperidone 9 MG 24 hr tablet    INVEGA    30 tablet    Take 1 tablet (9 mg) by mouth every morning    Schizoaffective disorder, bipolar type (H)       polyethylene glycol powder    MIRALAX/GLYCOLAX    527 g    TAKE 8.5 GRAMS (1/2 CAPFUL) BY MOUTH DAILY    Constipation, unspecified constipation type       prochlorperazine 5 MG tablet    COMPAZINE    90 tablet    Take 1 tablet (5 mg) by mouth every 6 hours as needed for nausea or vomiting    Nausea       ranitidine 300 MG tablet    ZANTAC    90 tablet    TAKE 1 TABLET (300MG) BY MOUTH AT BEDTIME    Gastroesophageal reflux disease without esophagitis       senna-docusate 8.6-50 MG per tablet    SENOKOT-S;PERICOLACE    100 tablet    Take 1 tablet by mouth 2 times daily as needed for constipation    Drug-induced constipation       sertraline 100 MG tablet    ZOLOFT    60 tablet    Take 2 tablets (200 mg) by mouth daily    Schizoaffective disorder, bipolar type (H)       SUMAtriptan 25 MG tablet    IMITREX    12 tablet    Take 1-2 tablets (25-50 mg) by mouth at onset of headache for migraine May repeat in 2 hours. Max 8 tablets/24 hours.    Migraine with aura and without status migrainosus, not intractable       topiramate 25 MG tablet    TOPAMAX    90 tablet    25mg at bedtime for week 1, 50mg at bedtime for 1 week, and 75mg at bedtime thereafter    Morbid obesity (H)       vitamin D3 62525 UNITS capsule    CHOLECALCIFEROL    12 capsule    TAKE 1 CAPSULE (50,000 UNITS) MONTHLY    Vitamin D deficiency       * Notice:  This list has 2 medication(s) that are the same as other medications prescribed for you. Read the  directions carefully, and ask your doctor or other care provider to review them with you.

## 2018-09-06 NOTE — PROGRESS NOTES
SUBJECTIVE:                                                    Nena Tang is a 58 year old  female who presents to clinic today for the following health issues:  [facility]: Yessenia   MTM: Eugenia    Patient reports that when she went on vacation with her family to [place] she was too weak to walk and there were no scooters or wheelchair available for her. Patient reports that she thought she possibly got a heat stroke and she was dizzy and urinated herself and her son had to practically carry her back to the car. Patient reports feeling embarrassed about the whole situation especially because she was unable to even wash herself up after the incidence.     Diabetes Follow-up  Patient reports that she increased her dose of Lantus to 30 units one day because her blood sugar was too high. Patient did not experience any side effects. Discussed with patient that this is not safe practise to change medication doses without a provider or pharmacist guidance.   Reported blood sugar ranging: Mornin-137; Midday: 123-327 and Evenin-194  Patient is checking blood sugars: 3-4 times daily.    Blood sugar testing frequency justification: Adjustment of medication(s) and Patient modifying lifestyle changes (diet, exercise) with blood sugars  Results are as follows:    Diabetic concerns: None     Symptoms of hypoglycemia (low blood sugar): none     Paresthesias (numbness or burning in feet) or sores: Yes numbness     Date of last diabetic eye exam: COUPLE MONTHS AGO    BP Readings from Last 2 Encounters:   18 110/58   18 122/64     Hemoglobin A1C (%)   Date Value   2018 6.4 (H)   2018 6.2 (H)     LDL Cholesterol Calculated (mg/dL)   Date Value   2018 84   2017 64       Diabetes Management Resources    Urinary Incontinence: Reports that she has been having some incontinence especially when she coughs or sneezes. Patient states that she has been using depends to reduce  "accidents. Discussed that causes could involve being post menopausal, history of vaginal birth, age, obesity. Patient will continue to think about possibly being on medication if the issue continues to increase.     Mental Health Follow up  Reports that her mood has been up and down. States that she was not able to get the apartment that she had applied to and also lost the appeal. Patient states that she decided that her life is worth more and she will plan to apply to other places and try and get her own place. Patient also states that she did not get the job she had applied- she feels like bad luck has been coming her way- this is up setting to her but she keeps on planning to look for other options and solutions.     Status since last visit: improving.     See PHQ-9 for current symptoms.  Other associated symptoms: None    Complicating factors: none   Significant life event:  No   Current substance abuse:  None  Anxiety or Panic symptoms:  No    Sleep - still struggling with going to sleep because of the \"man\" she sees at night. Always helpful when she keeps her light open. Has not been wearing her CPAP at night which is typically something that helps with getting better sleep. Encouraged patient to try to remember to use her CPAP more often.   Appetite - good, no issues. Discussed making better food options and watching her portions especially when going to fast food restaurants.   Exercise - Discussed going back to physical therapy for some strengthening exercises. Encouraged patient to start walking small distances first and then increasing as tolerated.      Smoking - no  Alcohol - no  Street drugs - no  Marijuana - no    PHQ-9  English PHQ-9   Any Language               Problems taking medications regularly: No    Medication side effects: none    Diet: low fat/cholesterol    Social History   Substance Use Topics     Smoking status: Never Smoker     Smokeless tobacco: Never Used     Alcohol use No      " Comment: last month        Problem list and histories reviewed & adjusted, as indicated.  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 (H)     Illiterate     Irritable bowel syndrome     overweight - BMI >35     Takotsubo cardiomyopathy     CAD (coronary artery disease)     Restless legs syndrome (RLS)     CINDY (obstructive sleep apnea)- mild AHI 10.3     Verbal auditory hallucination     Chronic low back pain     Schizoaffective disorder, depressive type (H)     Migraine headache     HTN, goal below 140/90     Health Care Home     Lumbago     Cervicalgia     Cocaine abuse, episodic     Suicidal ideation     Esophageal reflux     Mild nonproliferative diabetic retinopathy (H)     Tardive dyskinesia     Alcohol use     Left cataract     Falls frequently     History of uterine cancer     Psychophysiological insomnia     Dysuria     Asymptomatic postmenopausal status     Abdominal pain, right lower quadrant     Sepsis (H)     Pneumonia of right lower lobe due to infectious organism (H)     Infectious encephalopathy     Non-intractable vomiting with nausea     Acute respiratory failure with hypoxia (H)     Thoughts of self harm     Gastroenteritis     Posttraumatic stress disorder     Cervical cancer screening     Type 2 diabetes mellitus with stage 3 chronic kidney disease, with long-term current use of insulin (H)     Past Surgical History:   Procedure Laterality Date     C OOPHORECTORMY FOR RAHEL, W/BX  1983    UTERINE     CATARACT IOL, RT/LT Bilateral 2017     COLONOSCOPY N/A 3/16/2017    Procedure: COLONOSCOPY;  Surgeon: Traci Gonzalez MD;  Location: [facility]     Coronary CTA  5/21/2014     HYSTERECTOMY  1983    uterine cancer yearly pap's per provider.     LAPAROSCOPIC CHOLECYSTECTOMY  2008      PHACOEMULSIFICATION CLEAR CORNEA WITH STANDARD INTRAOCULAR LENS IMPLANT Left 2017    Procedure: PHACOEMULSIFICATION CLEAR CORNEA WITH STANDARD INTRAOCULAR LENS IMPLANT;  LEFT EYE PHACOEMULSIFICATION CLEAR CORNEA WITH STANDARD INTRAOCULAR LENS IMPLANT ;  Surgeon: Tyra Diaz MD;  Location:  [facility]     PHACOEMULSIFICATION CLEAR CORNEA WITH STANDARD INTRAOCULAR LENS IMPLANT Right 2017    Procedure: PHACOEMULSIFICATION CLEAR CORNEA WITH STANDARD INTRAOCULAR LENS IMPLANT;  RIGHT EYE PHACOEMULSIFICATION CLEAR CORNEA WITH STANDARD INTRAOCULAR LENS IMPLANT;  Surgeon: Tyra Diaz MD;  Location:  [facility]     RELEASE TRIGGER FINGER  10/11/2012    Left thumb. Procedure: RELEASE TRIGGER FINGER;  LEFT THUMB TRIGGER RELEASE;  Surgeon: Tay Langley MD;  Location:  [facility]     RELEASE TRIGGER FINGER Right 2016    Procedure: RELEASE TRIGGER FINGER;  Surgeon: Albino Castañeda MD;  Location: [facility]       Social History   Substance Use Topics     Smoking status: Never Smoker     Smokeless tobacco: Never Used     Alcohol use No      Comment: last month     Family History   Problem Relation Age of Onset     Cancer Mother      BLADDER     Respiratory Mother      COPD     GASTROINTESTINAL DISEASE Mother      CIRRHOSIS OF LI BOLIVAR     Alcohol/Drug Mother      Diabetes Mother      Hypertension Mother      Lipids Mother      C.A.D. Mother      Glaucoma Mother      Alcohol/Drug Sister      Mental Illness Sister      Alcohol/Drug Sister      Psychotic Disorder Sister      Cancer Maternal Grandmother      UNKNOWN TYPE     Cancer Brother      COLON     Cancer - colorectal Brother      IN HIS LATE 30S     Alcohol/Drug Brother       OF HEROIN OVERDOSE AT AGE 22 YRS     Macular Degeneration No family hx of            Current Outpatient Prescriptions   Medication Sig Dispense Refill     acetaminophen (TYLENOL) 500 MG tablet Take 2 tablets (1,000 mg) by mouth 3 times daily as needed for mild pain 100 tablet 3      ACETAMINOPHEN PO Take 1,000 mg by mouth every 6 hours as needed for pain or fever       albuterol (PROAIR HFA/PROVENTIL HFA/VENTOLIN HFA) 108 (90 Base) MCG/ACT Inhaler Inhale 2 puffs into the lungs every 6 hours as needed for shortness of breath / dyspnea or wheezing 1 Inhaler 0     aspirin 81 MG EC tablet TAKE 1 TABLET (81MG) BY MOUTH DAILY 28 tablet 3     atorvastatin (LIPITOR) 80 MG tablet TAKE 1 TABLET (80MG) BY MOUTH DAILY 30 tablet 11     benztropine (COGENTIN) 0.5 MG tablet Take 1 tablet (0.5 mg) by mouth 2 times daily 60 tablet 2     blood glucose monitoring ([employer] TEST STRIPS) test strip        blood glucose monitoring (ONE TOUCH DELICA) lancets Use to test blood sugar 2 times daily or as directed.  Ok to substitute alternative if insurance prefers. 150 each 11     blood glucose monitoring (ONETOUCH VERIO IQ) test strip Use to test blood sugar 4 times daily .  Ok to substitute alternative if insurance prefers. 200 strip 11     calcium carbonate (OS-ALLEN 600 MG [place]. [place]) 1500 (600 CA) MG tablet Take 1 tablet (1,500 mg) by mouth daily 180 tablet 3     clotrimazole (LOTRIMIN) 1 % cream Apply topically 2 times daily       DiphenhydrAMINE HCl (DIPHENDRYL PO) Take 25 mg by mouth every 6 hours as needed for itching       dulaglutide (TRULICITY) 1.5 MG/0.5ML pen Inject 1.5 mg Subcutaneous every 7 days 2 mL 3     fluticasone (FLONASE) 50 MCG/ACT spray Spray 1-2 sprays into both nostrils daily 1 Bottle 11     gabapentin (NEURONTIN) 300 MG capsule TAKE 2 CAPSULES (600MG) BY MOUTH THREE TIMES A  capsule 1     glucose 4 G CHEW chewable tablet Take 2 every 15 minutes for blood sugar <70mg/dL. Recheck blood sugar every 15 minutes until above 70mg/dL, then eat a substantial meal. 20 tablet 1     guaiFENesin-codeine (ROBITUSSIN AC) 100-10 MG/5ML SOLN solution Take 5 mLs by mouth every 6 hours as needed for cough 120 mL 0     ibuprofen (ADVIL,MOTRIN) 600 MG tablet Take 1 tablet (600 mg) by mouth every 4 hours as  needed for moderate pain 60 tablet 0     insulin aspart (NOVOLOG FLEXPEN) 100 UNIT/ML injection Inject 20 Units Subcutaneous 3 times daily (with meals) Once daily, can add additional 5 units if BGs are >500mg/dL. 15 mL 11     insulin glargine (LANTUS SOLOSTAR) 100 UNIT/ML pen Inject 48 Units Subcutaneous At Bedtime 3 mL 11     insulin pen needle (NOVOFINE 30) 30G X 8 MM USE 4 DAILY OR AS DIRECTED 400 each 0     ipratropium - albuterol 0.5 mg/2.5 mg/3 mL (DUONEB) 0.5-2.5 (3) MG/3ML neb solution Take 1 vial (3 mLs) by nebulization every 6 hours as needed for shortness of breath / dyspnea or wheezing 30 vial 0     levofloxacin (LEVAQUIN) 750 MG tablet Take 1 tablet (750 mg) by mouth daily 7 tablet 0     losartan (COZAAR) 100 MG tablet TAKE 1 TABLET (100MG) BY MOUTH DAILY 28 tablet 3     melatonin 5 MG CAPS Take 2 capsules by mouth daily At dinnertime 180 capsule 2     metoprolol succinate (TOPROL-XL) 25 MG 24 hr tablet Take 1 tablet (25 mg) by mouth daily 30 tablet 1     order for DME Equipment being ordered: Nebulizer Machine 1 Units 0     order for DME Equipment being ordered: 4 Wheeled walker with seat and brakes. 1 each 0     order for DME Diabetic Shoes 2 each 0     order for DME Hold Novolog if meals are refused. 1 each 0     paliperidone (INVEGA) 9 MG 24 hr tablet Take 1 tablet (9 mg) by mouth every morning 30 tablet 2     polyethylene glycol (MIRALAX/GLYCOLAX) powder TAKE 8.5 GRAMS (1/2 CAPFUL) BY MOUTH DAILY 527 g 3     prochlorperazine (COMPAZINE) 5 MG tablet Take 1 tablet (5 mg) by mouth every 6 hours as needed for nausea or vomiting 90 tablet 0     ranitidine (ZANTAC) 300 MG tablet TAKE 1 TABLET (300MG) BY MOUTH AT BEDTIME 90 tablet 1     senna-docusate (SENOKOT-S;PERICOLACE) 8.6-50 MG per tablet Take 1 tablet by mouth 2 times daily as needed for constipation 100 tablet 1     sertraline (ZOLOFT) 100 MG tablet Take 2 tablets (200 mg) by mouth daily 60 tablet 2     SUMAtriptan (IMITREX) 25 MG tablet Take 1-2  tablets (25-50 mg) by mouth at onset of headache for migraine May repeat in 2 hours. Max 8 tablets/24 hours. 12 tablet 1     topiramate (TOPAMAX) 25 MG tablet 25mg at bedtime for week 1, 50mg at bedtime for 1 week, and 75mg at bedtime thereafter 90 tablet 3     vitamin D3 (CHOLECALCIFEROL) 57735 UNITS capsule TAKE 1 CAPSULE (50,000 UNITS) MONTHLY 12 capsule 1     Allergies   Allergen Reactions     Imidazole Antifungals Hives     Tolerates diflucan     Ketoprofen Itching     Pruritis to topical     Lisinopril Hives     Metformin Other (See Comments)     Patient hospitalized for lactic acidosis - admitting provider suspectd caused by metformin     Metronidazole Hives     Posaconazole Hives     Tolerates diflucan     Recent Labs   Lab Test  08/06/18   1038  06/29/18   1333  05/28/18   1625  05/22/18   1434  02/12/18   1408  02/08/18   2155   01/13/18   2315   11/07/17   0801   06/27/17   1358   12/29/16   0909   07/13/16   1350   07/14/15   1215   A1C  6.4*   --    --   6.2*  6.8*   --    --    --    < >  8.9*   < >  7.0*   < >   --    < >   --    < >  9.1*   LDL  84   --    --    --    --    --    --    --    --    --    --   64   --    --    --   137*   --   78   HDL  46*   --    --    --    --    --    --    --    --    --    --   37*   --    --    --    --    --   39*   TRIG  215*   --    --    --    --    --    --    --    --    --    --   267*   --    --    --    --    --   151*   ALT   --    --   27   --    --   23   --   29   --   23   < >  32   < >  26   < >   --    < >   --    CR   --   1.06*  1.16*   --    --   1.09*   < >  1.62*   < >  0.88   < >  0.78   < >  0.72   < >   --    < >  0.67   GFRESTIMATED   --   53*  48*   --    --   52*   < >  33*   < >  66   < >  76   < >  83   < >   --    < >  >90  Non  GFR Calc     GFRESTBLACK   --   65  58*   --    --   63   < >  40*   < >  80   < >  >90   GFR Calc     < >  >90   GFR Calc     < >   --    < >  >90    American GFR Calc     POTASSIUM   --   4.7  4.5   --    --   4.3   < >  4.0   < >  3.9   < >  4.3   < >  4.2   < >   --    < >  4.3   TSH   --    --    --    --    --    --    --    --    --   3.21   --    --    --   2.24   < >   --    < >   --     < > = values in this interval not displayed.      BP Readings from Last 3 Encounters:   08/22/18 110/58   08/06/18 122/64   07/16/18 92/58    Wt Readings from Last 3 Encounters:   08/22/18 239 lb (108.4 kg)   08/06/18 238 lb (108 kg)   07/16/18 235 lb (106.6 kg)        Labs reviewed in EPIC  Problem list, Medication list, Allergies, and Medical/Social/Surgical histories reviewed in [facility] and updated as appropriate.     ROS: Constitutional, neuro, ENT, endocrine, pulmonary, cardiac, gastrointestinal, genitourinary, musculoskeletal, integument and psychiatric systems are negative, except as otherwise noted above in the HPI.   OBJECTIVE:                                                    Pulse 80  Temp 98.4  F (36.9  C) (Oral)  Resp 18  Wt 240 lb (108.9 kg)  LMP 01/06/2015  SpO2 96%  BMI 45.9 kg/m2  Body mass index is 45.9 kg/(m^2).  GENERAL: Obese, alert, no distress  EYES: Eyes grossly normal to inspection, extraocular movements - intact  RESP: lungs clear to auscultation - no rales, no rhonchi, no wheezes  CV: regular rates and rhythm, normal S1 S2, no S3 or S4 and no murmur, no click or rub   MS: Slow gait.   NEURO: A&O x4  Diabetic foot exam: normal DP and PT pulses, no trophic changes or ulcerative lesions and normal sensory exam    Mental Status Assessment:  Appearance:   Appropriate   Eye Contact:   Good   Psychomotor Behavior: Normal   Attitude:   Cooperative   Orientation:   All  Speech   Rate / Production: Normal    Volume:  Normal   Mood:    Normal  Affect:    Appropriate   Thought Content:  Clear   Thought Form:  Coherent  Logical   Insight:    Fair   Attention Span and Concentration:  appropriate  Recent and Remote Memory:  intact  Fund of Knowledge:  appropriate  Muscle Strength and Tone: normal   Suicidal Ideation: reports no thoughts, no intention  Hallucination: Yes  Paranoid-No  Manic-No  Panic-No  Self harm-No    See [facility] notes     Diagnostic Test Results:  Results for orders placed or performed in visit on 09/06/18   HIV Screening   Result Value Ref Range    HIV Antigen Antibody Combo Nonreactive NR^Nonreactive       Albumin Random Urine Quantitative with Creat Ratio   Result Value Ref Range    Creatinine Urine 76 mg/dL    Albumin Urine mg/L <5 mg/L    Albumin Urine mg/g Cr Unable to calculate due to low value 0 - 25 mg/g Cr   CK total   Result Value Ref Range    CK Total 120 30 - 225 U/L   Vitamin D deficiency screening   Result Value Ref Range    Vitamin D Deficiency screening 43 20 - 75 ug/L     *Note: Due to a large number of results and/or encounters for the requested time period, some results have not been displayed. A complete set of results can be found in Results Review.        ASSESSMENT/PLAN:                                                    (M62.81) Muscle weakness (generalized)  (primary encounter diagnosis)  Comment: Reports increased weakness to legs and back pain that might be related to deconditioning vs rhabdo vs other etiology. Will check labs and update lumbar imaging to rule out possible causes.   Plan: PHYSICAL THERAPY REFERRAL, CK total, CT Lumbar         Spine w/o Contrast        PT referral to help with muscle strengthening,     (E11.22,  N18.3,  Z79.4) Type 2 diabetes mellitus with stage 3 chronic kidney disease, with long-term current use of insulin (H)  Comment: Noted above.   Plan: Albumin Random Urine Quantitative with Creat         Ratio        Normal labs. Will continue to monitor.     (Z11.4) Screening for HIV (human immunodeficiency virus)  Comment: Routine screening.   Plan: HIV Screening          (Z13.89) Screening for diabetic peripheral neuropathy  Comment: Routine screening.   Plan: FOOT EXAM  NO CHARGE [67814.114]          (R05) Cough  Comment: Still coughing, insurance would not cover for the prescribed cough medication. Alternative ordered.   Plan: guaiFENesin-codeine (ROBITUSSIN AC) 100-10         MG/5ML SOLN solution, guaiFENesin (MUCINEX) 600        MG 12 hr tablet          (E55.9) Vitamin D deficiency  Comment: Routine monitoring.   Plan: Vitamin D deficiency screening        -Level at therapeutic level- will discuss with patient at the next visit.       Patient Instructions   -Take tylenol in the morning and at bedtime to help with the back pain.   -Will be called about scheduling for Back CT.   -Physical therapy ordered.   -Labs today.     I spent Greater than 40 minutes was spent face to face with Nena Tang, with greater than 50 % in counselling and consultation about: as noted above.       [facility] PRIMARY CARE

## 2018-09-07 LAB
CK SERPL-CCNC: 120 U/L (ref 30–225)
CREAT UR-MCNC: 76 MG/DL
DEPRECATED CALCIDIOL+CALCIFEROL SERPL-MC: 43 UG/L (ref 20–75)
HIV 1+2 AB+HIV1 P24 AG SERPL QL IA: NONREACTIVE
MICROALBUMIN UR-MCNC: <5 MG/L
MICROALBUMIN/CREAT UR: NORMAL MG/G CR (ref 0–25)

## 2018-09-10 ENCOUNTER — OFFICE VISIT (OUTPATIENT)
Dept: ENDOCRINOLOGY | Facility: CLINIC | Age: 58
End: 2018-09-10
Payer: MEDICARE

## 2018-09-10 VITALS
WEIGHT: 237.5 LBS | DIASTOLIC BLOOD PRESSURE: 72 MMHG | OXYGEN SATURATION: 94 % | BODY MASS INDEX: 44.84 KG/M2 | HEART RATE: 78 BPM | HEIGHT: 61 IN | SYSTOLIC BLOOD PRESSURE: 135 MMHG

## 2018-09-10 DIAGNOSIS — E66.01 MORBID OBESITY (H): ICD-10-CM

## 2018-09-10 RX ORDER — TOPIRAMATE 25 MG/1
50 TABLET, FILM COATED ORAL 2 TIMES DAILY
Qty: 120 TABLET | Refills: 3 | Status: SHIPPED | OUTPATIENT
Start: 2018-09-10 | End: 2019-01-03

## 2018-09-10 NOTE — NURSING NOTE
"  Chief Complaint   Patient presents with     Weight Problem     RMWM     Vitals:    09/10/18 1030   BP: 135/72   Pulse: 78   SpO2: 94%   Weight: 237 lb 8 oz   Height: 5' 0.63\"     Body mass index is 45.42 kg/(m^2).  Татьяна Munoz CMA    "

## 2018-09-10 NOTE — LETTER
"9/10/2018      RE: Nena Tang  [address] [facility] [address]       Return Medical Weight Management Note     Nena Tang  MRN:  [id number]  :  1960  GINNY:  9/10/2018    Dear Rowena Haas,    I had the pleasure of seeing your patient Nena Tang.  She is a 58 year old female who I am continuing to see for treatment of obesity related to:       2018   I have the following co-morbidities associated with obesity: Type II Diabetes, Pre-Diabetes, Heart Disease, High Blood Pressure, High Cholesterol       INTERVAL HISTORY:  New consult 18 237 lbs and today same weight.      Lantus 48 units at bedtime and novolog 20 units TID with meals, Trulicity  Dx with Type II DM over 20 yrs ago  Last A1C 6.4 on 18    Started on topiramate last visit. She is tolerating it well and taking 75mg at bedtime.      CURRENT WEIGHT:   237 lbs 8 oz    Wt Readings from Last 4 Encounters:   09/10/18 237 lb 8 oz   18 240 lb   18 239 lb   18 238 lb       Height:  5' .63\"  Body Mass Index:  Body mass index is 45.42 kg/(m^2).  Vitals:  /72  Pulse 78  Ht 5' 0.63\"  Wt 237 lb 8 oz  LMP 2015  SpO2 94%  BMI 45.42 kg/m2      Initial consult weight was 237 on 18.  Weight change since last seen on 2018 is down 0 pounds.   Total loss is 0 pounds.    Diet and Activity Changes Since Last Visit Reviewed With Patient 9/10/2018   I have made the following changes to my activity/exercise since my last visit: good        MEDICATIONS:   Current Outpatient Prescriptions   Medication     acetaminophen (TYLENOL) 500 MG tablet     albuterol (PROAIR HFA/PROVENTIL HFA/VENTOLIN HFA) 108 (90 Base) MCG/ACT Inhaler     aspirin 81 MG EC tablet     atorvastatin (LIPITOR) 80 MG tablet     benztropine (COGENTIN) 0.5 MG tablet     blood glucose monitoring (ONE TOUCH DELICA) lancets     blood glucose monitoring (ONETOUCH VERIO IQ) test strip     calcium carbonate (OS-ALLEN 600 MG [place]. [place]) " 1500 (600 CA) MG tablet     clotrimazole (LOTRIMIN) 1 % cream     DiphenhydrAMINE HCl (DIPHENDRYL PO)     dulaglutide (TRULICITY) 1.5 MG/0.5ML pen     fluticasone (FLONASE) 50 MCG/ACT spray     gabapentin (NEURONTIN) 300 MG capsule     glucose 4 G CHEW chewable tablet     guaiFENesin (MUCINEX) 600 MG 12 hr tablet     guaiFENesin-codeine (ROBITUSSIN AC) 100-10 MG/5ML SOLN solution     ibuprofen (ADVIL,MOTRIN) 600 MG tablet     insulin aspart (NOVOLOG FLEXPEN) 100 UNIT/ML injection     insulin glargine (LANTUS SOLOSTAR) 100 UNIT/ML pen     insulin pen needle (NOVOFINE 30) 30G X 8 MM     ipratropium - albuterol 0.5 mg/2.5 mg/3 mL (DUONEB) 0.5-2.5 (3) MG/3ML neb solution     losartan (COZAAR) 100 MG tablet     melatonin 5 MG CAPS     metoprolol succinate (TOPROL-XL) 25 MG 24 hr tablet     order for DME     order for DME     order for DME     order for DME     paliperidone (INVEGA) 9 MG 24 hr tablet     polyethylene glycol (MIRALAX/GLYCOLAX) powder     prochlorperazine (COMPAZINE) 5 MG tablet     ranitidine (ZANTAC) 300 MG tablet     senna-docusate (SENOKOT-S;PERICOLACE) 8.6-50 MG per tablet     sertraline (ZOLOFT) 100 MG tablet     SUMAtriptan (IMITREX) 25 MG tablet     topiramate (TOPAMAX) 25 MG tablet     vitamin D3 (CHOLECALCIFEROL) 00366 UNITS capsule     No current facility-administered medications for this visit.        No flowsheet data found.    ASSESSMENT:   58 y.o. Female here for [facility] follow up.  She has had weight gain after the start of psychiatric medications.  She is tolerating topiramate 75mg and it is helping her to eat less. After lunch she often doesn't feel hungry at night.     PLAN:   Increase dose of topiramate 50mg twice daily.    FOLLOW-UP:    12 weeks.    Time: 15 min spent on evaluation, management, counseling, education, & motivational interviewing with greater than 50 % of the total time was spent on counseling and coordinating care      Debbie Germain PA-C

## 2018-09-10 NOTE — PATIENT INSTRUCTIONS
Increase topiramate to 50mg twice daily    See Debbie Germain in 3-4 months for return medical weight management

## 2018-09-10 NOTE — PROGRESS NOTES
"    Return Medical Weight Management Note     Nena Tang  MRN:  [id number]  :  1960  GINNY:  9/10/2018    Dear Rowena Haas,    I had the pleasure of seeing your patient Nena Tang.  She is a 58 year old female who I am continuing to see for treatment of obesity related to:       2018   I have the following co-morbidities associated with obesity: Type II Diabetes, Pre-Diabetes, Heart Disease, High Blood Pressure, High Cholesterol       INTERVAL HISTORY:  New consult 18 237 lbs and today same weight.      Lantus 48 units at bedtime and novolog 20 units TID with meals, Trulicity  Dx with Type II DM over 20 yrs ago  Last A1C 6.4 on 18    Started on topiramate last visit. She is tolerating it well and taking 75mg at bedtime.      CURRENT WEIGHT:   237 lbs 8 oz    Wt Readings from Last 4 Encounters:   09/10/18 237 lb 8 oz   18 240 lb   18 239 lb   18 238 lb       Height:  5' .63\"  Body Mass Index:  Body mass index is 45.42 kg/(m^2).  Vitals:  /72  Pulse 78  Ht 5' 0.63\"  Wt 237 lb 8 oz  LMP 2015  SpO2 94%  BMI 45.42 kg/m2      Initial consult weight was 237 on 18.  Weight change since last seen on 2018 is down 0 pounds.   Total loss is 0 pounds.    Diet and Activity Changes Since Last Visit Reviewed With Patient 9/10/2018   I have made the following changes to my activity/exercise since my last visit: good        MEDICATIONS:   Current Outpatient Prescriptions   Medication     acetaminophen (TYLENOL) 500 MG tablet     albuterol (PROAIR HFA/PROVENTIL HFA/VENTOLIN HFA) 108 (90 Base) MCG/ACT Inhaler     aspirin 81 MG EC tablet     atorvastatin (LIPITOR) 80 MG tablet     benztropine (COGENTIN) 0.5 MG tablet     blood glucose monitoring (ONE TOUCH DELICA) lancets     blood glucose monitoring (ONETOUCH VERIO IQ) test strip     calcium carbonate (OS-ALLEN 600 MG [place]. CA) 1500 (600 CA) MG tablet     clotrimazole (LOTRIMIN) 1 % cream     DiphenhydrAMINE " HCl (DIPHENDRYL PO)     dulaglutide (TRULICITY) 1.5 MG/0.5ML pen     fluticasone (FLONASE) 50 MCG/ACT spray     gabapentin (NEURONTIN) 300 MG capsule     glucose 4 G CHEW chewable tablet     guaiFENesin (MUCINEX) 600 MG 12 hr tablet     guaiFENesin-codeine (ROBITUSSIN AC) 100-10 MG/5ML SOLN solution     ibuprofen (ADVIL,MOTRIN) 600 MG tablet     insulin aspart (NOVOLOG FLEXPEN) 100 UNIT/ML injection     insulin glargine (LANTUS SOLOSTAR) 100 UNIT/ML pen     insulin pen needle (NOVOFINE 30) 30G X 8 MM     ipratropium - albuterol 0.5 mg/2.5 mg/3 mL (DUONEB) 0.5-2.5 (3) MG/3ML neb solution     losartan (COZAAR) 100 MG tablet     melatonin 5 MG CAPS     metoprolol succinate (TOPROL-XL) 25 MG 24 hr tablet     order for DME     order for DME     order for DME     order for DME     paliperidone (INVEGA) 9 MG 24 hr tablet     polyethylene glycol (MIRALAX/GLYCOLAX) powder     prochlorperazine (COMPAZINE) 5 MG tablet     ranitidine (ZANTAC) 300 MG tablet     senna-docusate (SENOKOT-S;PERICOLACE) 8.6-50 MG per tablet     sertraline (ZOLOFT) 100 MG tablet     SUMAtriptan (IMITREX) 25 MG tablet     topiramate (TOPAMAX) 25 MG tablet     vitamin D3 (CHOLECALCIFEROL) 75158 UNITS capsule     No current facility-administered medications for this visit.        No flowsheet data found.    ASSESSMENT:   58 y.o. Female here for [facility] follow up.  She has had weight gain after the start of psychiatric medications.  She is tolerating topiramate 75mg and it is helping her to eat less. After lunch she often doesn't feel hungry at night.     PLAN:   Increase dose of topiramate 50mg twice daily.    FOLLOW-UP:    12 weeks.    Time: 15 min spent on evaluation, management, counseling, education, & motivational interviewing with greater than 50 % of the total time was spent on counseling and coordinating care    Sincerely,    Debbie Germain PA-C

## 2018-09-10 NOTE — LETTER
"9/10/2018       RE: Nena Tang  [address] [address]     Dear Colleague,    Thank you for referring your patient, Nena Tang, to the [facility] MEDICAL WEIGHT MANAGEMENT at [facility]. Please see a copy of my visit note below.        Return Medical Weight Management Note     Nena Tang  MRN:  [id number]  :  1960  GINNY:  9/10/2018    Dear Rowena Haas,    I had the pleasure of seeing your patient Nena Tang.  She is a 58 year old female who I am continuing to see for treatment of obesity related to:       2018   I have the following co-morbidities associated with obesity: Type II Diabetes, Pre-Diabetes, Heart Disease, High Blood Pressure, High Cholesterol       INTERVAL HISTORY:  New consult 18 237 lbs and today same weight.      Lantus 48 units at bedtime and novolog 20 units TID with meals, Trulicity  Dx with Type II DM over 20 yrs ago  Last A1C 6.4 on 18    Started on topiramate last visit. She is tolerating it well and taking 75mg at bedtime.      CURRENT WEIGHT:   237 lbs 8 oz    Wt Readings from Last 4 Encounters:   09/10/18 237 lb 8 oz   18 240 lb   18 239 lb   18 238 lb       Height:  5' .63\"  Body Mass Index:  Body mass index is 45.42 kg/(m^2).  Vitals:  /72  Pulse 78  Ht 5' 0.63\"  Wt 237 lb 8 oz  LMP 2015  SpO2 94%  BMI 45.42 kg/m2      Initial consult weight was 237 on 18.  Weight change since last seen on 2018 is down 0 pounds.   Total loss is 0 pounds.    Diet and Activity Changes Since Last Visit Reviewed With Patient 9/10/2018   I have made the following changes to my activity/exercise since my last visit: good        MEDICATIONS:   Current Outpatient Prescriptions   Medication     acetaminophen (TYLENOL) 500 MG tablet     albuterol (PROAIR HFA/PROVENTIL HFA/VENTOLIN HFA) 108 (90 Base) MCG/ACT Inhaler     aspirin 81 MG EC tablet     atorvastatin (LIPITOR) " 80 MG tablet     benztropine (COGENTIN) 0.5 MG tablet     blood glucose monitoring (ONE TOUCH DELICA) lancets     blood glucose monitoring (ONETOUCH VERIO IQ) test strip     calcium carbonate (OS-ALLEN 600 MG [place]. CA) 1500 (600 CA) MG tablet     clotrimazole (LOTRIMIN) 1 % cream     DiphenhydrAMINE HCl (DIPHENDRYL PO)     dulaglutide (TRULICITY) 1.5 MG/0.5ML pen     fluticasone (FLONASE) 50 MCG/ACT spray     gabapentin (NEURONTIN) 300 MG capsule     glucose 4 G CHEW chewable tablet     guaiFENesin (MUCINEX) 600 MG 12 hr tablet     guaiFENesin-codeine (ROBITUSSIN AC) 100-10 MG/5ML SOLN solution     ibuprofen (ADVIL,MOTRIN) 600 MG tablet     insulin aspart (NOVOLOG FLEXPEN) 100 UNIT/ML injection     insulin glargine (LANTUS SOLOSTAR) 100 UNIT/ML pen     insulin pen needle (NOVOFINE 30) 30G X 8 MM     ipratropium - albuterol 0.5 mg/2.5 mg/3 mL (DUONEB) 0.5-2.5 (3) MG/3ML neb solution     losartan (COZAAR) 100 MG tablet     melatonin 5 MG CAPS     metoprolol succinate (TOPROL-XL) 25 MG 24 hr tablet     order for DME     order for DME     order for DME     order for DME     paliperidone (INVEGA) 9 MG 24 hr tablet     polyethylene glycol (MIRALAX/GLYCOLAX) powder     prochlorperazine (COMPAZINE) 5 MG tablet     ranitidine (ZANTAC) 300 MG tablet     senna-docusate (SENOKOT-S;PERICOLACE) 8.6-50 MG per tablet     sertraline (ZOLOFT) 100 MG tablet     SUMAtriptan (IMITREX) 25 MG tablet     topiramate (TOPAMAX) 25 MG tablet     vitamin D3 (CHOLECALCIFEROL) 84426 UNITS capsule     No current facility-administered medications for this visit.        No flowsheet data found.    ASSESSMENT:   58 y.o. Female here for [facility] follow up.  She has had weight gain after the start of psychiatric medications.  She is tolerating topiramate 75mg and it is helping her to eat less. After lunch she often doesn't feel hungry at night.     PLAN:   Increase dose of topiramate 50mg twice daily.    FOLLOW-UP:    12 weeks.    Time: 15 min spent on  evaluation, management, counseling, education, & motivational interviewing with greater than 50 % of the total time was spent on counseling and coordinating care    Sincerely,    Debbie Germain PA-C    Again, thank you for allowing me to participate in the care of your patient.      Sincerely,    Debbie Germain PA-C

## 2018-09-10 NOTE — MR AVS SNAPSHOT
After Visit Summary   9/10/2018    Nena Tang    MRN: [id number]           Patient Information     Date Of Birth          1960        Visit Information        Provider Department      9/10/2018 10:45 AM Debbie Germain PA-C [facility]        Today's Diagnoses     Morbid obesity (H)          Care Instructions    Increase topiramate to 50mg twice daily    See Debbie Germain in 3-4 months for return medical weight management          Follow-ups after your visit        Your next 10 appointments already scheduled     Sep 11, 2018  3:00 PM CDT   Return Visit with Richardson Lopez [facility] ([facility])    [address]  [address]  [facility] [address]   [phone]            Sep 17, 2018  2:45 PM CDT   RETURN RETINA with Tiburcio Chacon MD   [facility] Clinic ([facility])    [address]  [facility] [address]   [phone]            Sep 20, 2018 10:15 AM CDT   Evaluation with Agatha Pierce, PT   [facility] ([facility])    [address] [facility] [id number] [phone]            Oct 05, 2018  2:00 PM CDT   Return Visit with ART Arias [facility] ([facility])    [address]  [address]  [facility] [address]   [phone]            Oct 05, 2018  2:00 PM CDT   Return Visit with Richardson Lopez [facility] ([facility])    [address]  [address]  [facility] [address]   [phone]            Nov 30, 2018  1:30 PM CST   Return Visit with Kraig Pedersen MD   [facility] ([facility])    [address]  [facility] [address]   [phone]  "             Who to contact     Please call your clinic at [phone] to:    Ask questions about your health    Make or cancel appointments    Discuss your medicines    Learn about your test results    Speak to your doctor            Additional Information About Your Visit        [employer] Information     upad gives you secure access to your electronic health record. If you see a primary care provider, you can also send messages to your care team and make appointments. If you have questions, please call your primary care clinic.  If you do not have a primary care provider, please call [phone] and they will assist you.      upad is an electronic gateway that provides easy, online access to your medical records. With upad, you can request a clinic appointment, read your test results, renew a prescription or communicate with your care team.     To access your existing account, please contact your [facility] or call [phone] for assistance.        Care EveryWhere ID     This is your Care EveryWhere ID. This could be used by other organizations to access your [place] medical records  [id number]        Your Vitals Were     Pulse Height Last Period Pulse Oximetry BMI (Body Mass Index)       78 5' 0.63\" 01/06/2015 94% 45.42 kg/m2        Blood Pressure from Last 3 Encounters:   09/10/18 135/72   08/22/18 110/58   08/06/18 122/64    Weight from Last 3 Encounters:   09/10/18 237 lb 8 oz   09/06/18 240 lb   08/22/18 239 lb              Today, you had the following     No orders found for display         Today's Medication Changes          These changes are accurate as of 9/10/18 11:11 AM.  If you have any questions, ask your nurse or doctor.               These medicines have changed or have updated prescriptions.        Dose/Directions    topiramate 25 MG tablet   Commonly known as:  TOPAMAX   This may have changed:    - how much to take  - how to take this  - when to take " this  - additional instructions   Used for:  Morbid obesity (H)   Changed by:  Debbie Germain PA-C        Dose:  50 mg   Take 2 tablets (50 mg) by mouth 2 times daily   Quantity:  120 tablet   Refills:  3            Where to get your medicines      These medications were sent to [facility] [place] [address] - [place], [place] - [address]  [address], [place] [address]     Phone:  [phone]     topiramate 25 MG tablet                Primary Care Provider Office Phone # Fax #    ART Arias [phone]       [address] [facility] [address]  [facility] [address]        Goals        General    Medical (pt-stated)     Notes - Note created  7/7/2016  9:15 AM by Chelsea Pulido RN    Get blood sugars under control    Improve medication compliance    As of today's date 7/7/2016 goal is met at 0 - 25%.   Goal Status:  Active          Equal Access to Services     [facility]: Hadii aad ku hadasho Soomaali, waaxda luqadaha, qaybta kaalmada adeegyada, waxay idiin hayaan adeeg kharasae ellison . So [facility] [phone].    ATENCIÓN: Si habla español, tiene a trinh disposición servicios gratuitos de asistencia lingüística. Llame al [phone].    We comply with applicable federal civil rights laws and [place] laws. We do not discriminate on the basis of race, color, national origin, age, disability, sex, sexual orientation, or gender identity.            Thank you!     Thank you for choosing [facility] MEDICAL WEIGHT MANAGEMENT  for your care. Our goal is always to provide you with excellent care. Hearing back from our patients is one way we can continue to improve our services. Please take a few minutes to complete the written survey that you may receive in the mail after your visit with us. Thank you!             Your Updated Medication List - Protect others around you: Learn how to safely use, store and throw away your medicines at www.disposemymeds.org.          This  list is accurate as of 9/10/18 11:11 AM.  Always use your most recent med list.                   Brand Name Dispense Instructions for use Diagnosis    acetaminophen 500 MG tablet    TYLENOL    100 tablet    Take 2 tablets (1,000 mg) by mouth 3 times daily as needed for mild pain    Chronic low back pain, unspecified back pain laterality, with sciatica presence unspecified       albuterol 108 (90 Base) MCG/ACT inhaler    PROAIR HFA/PROVENTIL HFA/VENTOLIN HFA    1 Inhaler    Inhale 2 puffs into the lungs every 6 hours as needed for shortness of breath / dyspnea or wheezing    Pneumonia, organism unspecified(486)       aspirin 81 MG EC tablet     28 tablet    TAKE 1 TABLET (81MG) BY MOUTH DAILY    Coronary artery disease involving native heart with angina pectoris, unspecified vessel or lesion type (H)       atorvastatin 80 MG tablet    LIPITOR    30 tablet    TAKE 1 TABLET (80MG) BY MOUTH DAILY    Hyperlipidemia LDL goal <100       benztropine 0.5 MG tablet    COGENTIN    60 tablet    Take 1 tablet (0.5 mg) by mouth 2 times daily    Extrapyramidal and movement disorder       blood glucose monitoring lancets     150 each    Use to test blood sugar 2 times daily or as directed.  Ok to substitute alternative if insurance prefers.    Type 2 diabetes mellitus with diabetic neuropathy, with long-term current use of insulin (H)       blood glucose monitoring test strip    ONETOUCH VERIO IQ    200 strip    Use to test blood sugar 4 times daily .  Ok to substitute alternative if insurance prefers.    Type 2 diabetes mellitus with diabetic neuropathy, with long-term current use of insulin (H)       calcium carbonate 600 mg {elemental} 1500 (600 Ca) MG tablet    OS-ALLEN    180 tablet    Take 1 tablet (1,500 mg) by mouth daily    Other osteoporosis without current pathological fracture       clotrimazole 1 % cream    LOTRIMIN     Apply topically 2 times daily        DIPHENDRYL PO      Take 25 mg by mouth every 6 hours as needed  for itching        dulaglutide 1.5 MG/0.5ML pen    TRULICITY    2 mL    Inject 1.5 mg Subcutaneous every 7 days    Type 2 diabetes mellitus with mild nonproliferative retinopathy without macular edema, with long-term current use of insulin, unspecified laterality (H)       fluticasone 50 MCG/ACT spray    FLONASE    1 Bottle    Spray 1-2 sprays into both nostrils daily    Seasonal allergic rhinitis, unspecified chronicity, unspecified trigger       gabapentin 300 MG capsule    NEURONTIN    168 capsule    TAKE 2 CAPSULES (600MG) BY MOUTH THREE TIMES A DAY    Type 2 diabetes mellitus with diabetic neuropathy, with long-term current use of insulin (H)       glucose 4 g Chew chewable tablet     20 tablet    Take 2 every 15 minutes for blood sugar <70mg/dL. Recheck blood sugar every 15 minutes until above 70mg/dL, then eat a substantial meal.    Type 2 diabetes mellitus with mild nonproliferative retinopathy without macular edema, with long-term current use of insulin, unspecified laterality (H)       guaiFENesin 600 MG 12 hr tablet    MUCINEX    20 tablet    Take 1 tablet (600 mg) by mouth 2 times daily as needed for congestion    Cough       guaiFENesin-codeine 100-10 MG/5ML Soln solution    ROBITUSSIN AC    120 mL    Take 5 mLs by mouth every 6 hours as needed for cough    Cough       ibuprofen 600 MG tablet    ADVIL/MOTRIN    60 tablet    Take 1 tablet (600 mg) by mouth every 4 hours as needed for moderate pain    Closed fracture of one rib of right side, initial encounter       insulin aspart 100 UNIT/ML injection    NovoLOG FLEXPEN    15 mL    Inject 20 Units Subcutaneous 3 times daily (with meals) Once daily, can add additional 5 units if BGs are >500mg/dL.    Type 2 diabetes mellitus with mild nonproliferative retinopathy without macular edema, with long-term current use of insulin, unspecified laterality (H)       insulin glargine 100 UNIT/ML injection    LANTUS    3 mL    Inject 48 Units Subcutaneous At Bedtime     Type 2 diabetes mellitus with mild nonproliferative retinopathy without macular edema, with long-term current use of insulin, unspecified laterality (H)       insulin pen needle 30G X 8 MM    NOVOFINE 30    400 each    USE 4 DAILY OR AS DIRECTED    Type 2 diabetes mellitus with mild nonproliferative retinopathy without macular edema, with long-term current use of insulin, unspecified laterality (H)       ipratropium - albuterol 0.5 mg/2.5 mg/3 mL 0.5-2.5 (3) MG/3ML neb solution    DUONEB    30 vial    Take 1 vial (3 mLs) by nebulization every 6 hours as needed for shortness of breath / dyspnea or wheezing    Wheezing       losartan 100 MG tablet    COZAAR    28 tablet    TAKE 1 TABLET (100MG) BY MOUTH DAILY    Coronary artery disease involving native heart with angina pectoris, unspecified vessel or lesion type (H)       melatonin 5 MG Caps     180 capsule    Take 2 capsules by mouth daily At dinnertime    Insomnia, unspecified type       metoprolol succinate 25 MG 24 hr tablet    TOPROL-XL    30 tablet    Take 1 tablet (25 mg) by mouth daily    Coronary artery disease involving native heart with angina pectoris, unspecified vessel or lesion type (H)       * order for DME     1 each    Hold Novolog if meals are refused.    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    Generalized muscle weakness       order for DME     1 Units    Equipment being ordered: Nebulizer Machine    Wheezing, Cough, Pneumonia due to infectious organism, unspecified laterality, unspecified part of lung       paliperidone 9 MG 24 hr  tablet    INVEGA    30 tablet    Take 1 tablet (9 mg) by mouth every morning    Schizoaffective disorder, bipolar type (H)       polyethylene glycol powder    MIRALAX/GLYCOLAX    527 g    TAKE 8.5 GRAMS (1/2 CAPFUL) BY MOUTH DAILY    Constipation, unspecified constipation type       prochlorperazine 5 MG tablet    COMPAZINE    90 tablet    Take 1 tablet (5 mg) by mouth every 6 hours as needed for nausea or vomiting    Nausea       ranitidine 300 MG tablet    ZANTAC    90 tablet    TAKE 1 TABLET (300MG) BY MOUTH AT BEDTIME    Gastroesophageal reflux disease without esophagitis       senna-docusate 8.6-50 MG per tablet    SENOKOT-S;PERICOLACE    100 tablet    Take 1 tablet by mouth 2 times daily as needed for constipation    Drug-induced constipation       sertraline 100 MG tablet    ZOLOFT    60 tablet    Take 2 tablets (200 mg) by mouth daily    Schizoaffective disorder, bipolar type (H)       SUMAtriptan 25 MG tablet    IMITREX    12 tablet    Take 1-2 tablets (25-50 mg) by mouth at onset of headache for migraine May repeat in 2 hours. Max 8 tablets/24 hours.    Migraine with aura and without status migrainosus, not intractable       topiramate 25 MG tablet    TOPAMAX    120 tablet    Take 2 tablets (50 mg) by mouth 2 times daily    Morbid obesity (H)       vitamin D3 90713 UNITS capsule    CHOLECALCIFEROL    12 capsule    TAKE 1 CAPSULE (50,000 UNITS) MONTHLY    Vitamin D deficiency       * Notice:  This list has 2 medication(s) that are the same as other medications prescribed for you. Read the directions carefully, and ask your doctor or other care provider to review them with you.

## 2018-09-17 ENCOUNTER — OFFICE VISIT (OUTPATIENT)
Dept: OPHTHALMOLOGY | Facility: CLINIC | Age: 58
End: 2018-09-17
Attending: OPHTHALMOLOGY
Payer: MEDICARE

## 2018-09-17 DIAGNOSIS — Z79.4 TYPE 2 DIABETES MELLITUS WITH BOTH EYES AFFECTED BY MILD NONPROLIFERATIVE RETINOPATHY AND MACULAR EDEMA, WITH LONG-TERM CURRENT USE OF INSULIN (H): ICD-10-CM

## 2018-09-17 DIAGNOSIS — E11.3213 TYPE 2 DIABETES MELLITUS WITH BOTH EYES AFFECTED BY MILD NONPROLIFERATIVE RETINOPATHY AND MACULAR EDEMA, WITH LONG-TERM CURRENT USE OF INSULIN (H): ICD-10-CM

## 2018-09-17 PROCEDURE — G0463 HOSPITAL OUTPT CLINIC VISIT: HCPCS | Mod: ZF

## 2018-09-17 PROCEDURE — 92134 CPTRZ OPH DX IMG PST SGM RTA: CPT | Mod: ZF | Performed by: OPHTHALMOLOGY

## 2018-09-17 ASSESSMENT — VISUAL ACUITY
OS_SC: 20/50
OD_PH_SC: 20/30
OD_SC: 20/40
OS_SC+: -1
METHOD: SNELLEN - LINEAR

## 2018-09-17 ASSESSMENT — CUP TO DISC RATIO
OS_RATIO: 0.3
OD_RATIO: 0.3

## 2018-09-17 ASSESSMENT — TONOMETRY
OS_IOP_MMHG: 20
IOP_METHOD: TONOPEN
OD_IOP_MMHG: 17

## 2018-09-17 ASSESSMENT — SLIT LAMP EXAM - LIDS
COMMENTS: NORMAL
COMMENTS: NORMAL

## 2018-09-17 ASSESSMENT — EXTERNAL EXAM - LEFT EYE: OS_EXAM: NORMAL

## 2018-09-17 ASSESSMENT — CONF VISUAL FIELD
OD_NORMAL: 1
OS_NORMAL: 1

## 2018-09-17 ASSESSMENT — EXTERNAL EXAM - RIGHT EYE: OD_EXAM: NORMAL

## 2018-09-17 NOTE — MR AVS SNAPSHOT
After Visit Summary   9/17/2018    Nena Tang    MRN: [id number]           Patient Information     Date Of Birth          1960        Visit Information        Provider Department      9/17/2018 2:45 PM [facility]urcio Chacon [facility]        Today's Diagnoses     Type 2 diabetes mellitus with both eyes affected by mild nonproliferative retinopathy and macular edema, with long-term current use of insulin (H)           Follow-ups after your visit        Follow-up notes from your care team     Return in about 6 months (around 3/17/2019) for Diabetic exam, Exam & OCT OU.      Your next 10 appointments already scheduled     Sep 20, 2018 10:15 AM CDT   Evaluation with Agatha Pierce, PT   [facility] ([facility])    [address] [facility] [id number] [phone]            Oct 05, 2018  2:00 PM CDT   Return Visit with ART Arias CNP   [facility] ([facility])    [address]  [address]  [facility] [address]   [phone]            Oct 05, 2018  2:00 PM CDT   Return Visit with BIN Mondragon   [facility]    [address]  [address]  [facility] [address]   [phone]            Nov 30, 2018  1:30 PM CST   Return Visit with Kraig Pedersen MD   [facility] ([facility])    [address]  [facility] [address]   [phone]            Mar 14, 2019  3:15 PM CDT   RETURN RETINA with Tiburcio Chacon MD   [facility] ([facility])    [address]  [address] [address]  [facility] [address]   [phone]              Future tests that were ordered for you today     Open Future Orders        Priority Expected Expires Ordered    OCT Retina Spectralis OU  (both eyes) Routine  3/20/2020 9/17/2018            Who to contact     Please call your clinic at [phone] to:    Ask questions about your health    Make or cancel appointments    Discuss your medicines    Learn about your test results    Speak to your doctor            Additional Information About Your Visit        MyChart Information     [employer] gives you secure access to your electronic health record. If you see a primary care provider, you can also send messages to your care team and make appointments. If you have questions, please call your primary care clinic.  If you do not have a primary care provider, please call [phone] and they will assist you.      [employer] is an electronic gateway that provides easy, online access to your medical records. With [employer], you can request a clinic appointment, read your test results, renew a prescription or communicate with your care team.     To access your existing account, please contact your [facility] or call [phone] for assistance.        Care EveryWhere ID     This is your Care EveryWhere ID. This could be used by other organizations to access your [facility] medical records  [id number]        Your Vitals Were     Last Period                   01/06/2015            Blood Pressure from Last 3 Encounters:   09/10/18 135/72   08/22/18 110/58   08/06/18 122/64    Weight from Last 3 Encounters:   09/10/18 107.7 kg (237 lb 8 oz)   09/06/18 108.9 kg (240 lb)   08/22/18 108.4 kg (239 lb)              We Performed the Following     OCT Retina Spectralis OU (both eyes)        Primary Care Provider Office Phone # Fax #    RowenaART Cedillo [phone]       [address] [address] [facility] [facility] [address]  [facility] [address]        Goals        General    Medical (pt-stated)     Notes - Note created  7/7/2016  9:15 AM by Chelsea Pulido, RN    Get blood sugars under control    Improve medication compliance    As of today's date  7/7/2016 goal is met at 0 - 25%.   Goal Status:  Active          Equal Access to Services     [facility]: Hadii samira shane tonny Rios, wadeejayda davidelijah, dannyta lois catalan, lorena phuongin hayaafredi jeffersonmonica johnson scot . So [facility] [phone].    ATENCIÓN: Si habla español, tiene a trinh disposición servicios gratuitos de asistencia lingüística. Llame al [phone].    We comply with applicable federal civil rights laws and [place] laws. We do not discriminate on the basis of race, color, national origin, age, disability, sex, sexual orientation, or gender identity.            Thank you!     Thank you for choosing [facility]  for your care. Our goal is always to provide you with excellent care. Hearing back from our patients is one way we can continue to improve our services. Please take a few minutes to complete the written survey that you may receive in the mail after your visit with us. Thank you!             Your Updated Medication List - Protect others around you: Learn how to safely use, store and throw away your medicines at www.disposemymeds.org.          This list is accurate as of 9/17/18  4:03 PM.  Always use your most recent med list.                   Brand Name Dispense Instructions for use Diagnosis    acetaminophen 500 MG tablet    TYLENOL    100 tablet    Take 2 tablets (1,000 mg) by mouth 3 times daily as needed for mild pain    Chronic low back pain, unspecified back pain laterality, with sciatica presence unspecified       albuterol 108 (90 Base) MCG/ACT inhaler    PROAIR HFA/PROVENTIL HFA/VENTOLIN HFA    1 Inhaler    Inhale 2 puffs into the lungs every 6 hours as needed for shortness of breath / dyspnea or wheezing    Pneumonia, organism unspecified(486)       aspirin 81 MG EC tablet     28 tablet    TAKE 1 TABLET (81MG) BY MOUTH DAILY    Coronary artery disease involving native heart with angina pectoris, unspecified vessel or lesion type (H)       atorvastatin 80 MG tablet    LIPITOR     30 tablet    TAKE 1 TABLET (80MG) BY MOUTH DAILY    Hyperlipidemia LDL goal <100       benztropine 0.5 MG tablet    COGENTIN    60 tablet    Take 1 tablet (0.5 mg) by mouth 2 times daily    Extrapyramidal and movement disorder       blood glucose monitoring lancets     150 each    Use to test blood sugar 2 times daily or as directed.  Ok to substitute alternative if insurance prefers.    Type 2 diabetes mellitus with diabetic neuropathy, with long-term current use of insulin (H)       blood glucose monitoring test strip    ONETOUCH VERIO IQ    200 strip    Use to test blood sugar 4 times daily .  Ok to substitute alternative if insurance prefers.    Type 2 diabetes mellitus with diabetic neuropathy, with long-term current use of insulin (H)       calcium carbonate 600 mg {elemental} 1500 (600 Ca) MG tablet    OS-ALLEN    180 tablet    Take 1 tablet (1,500 mg) by mouth daily    Other osteoporosis without current pathological fracture       clotrimazole 1 % cream    LOTRIMIN     Apply topically 2 times daily        DIPHENDRYL PO      Take 25 mg by mouth every 6 hours as needed for itching        dulaglutide 1.5 MG/0.5ML pen    TRULICITY    2 mL    Inject 1.5 mg Subcutaneous every 7 days    Type 2 diabetes mellitus with mild nonproliferative retinopathy without macular edema, with long-term current use of insulin, unspecified laterality (H)       fluticasone 50 MCG/ACT spray    FLONASE    1 Bottle    Spray 1-2 sprays into both nostrils daily    Seasonal allergic rhinitis, unspecified chronicity, unspecified trigger       gabapentin 300 MG capsule    NEURONTIN    168 capsule    TAKE 2 CAPSULES (600MG) BY MOUTH THREE TIMES A DAY    Type 2 diabetes mellitus with diabetic neuropathy, with long-term current use of insulin (H)       glucose 4 g Chew chewable tablet     20 tablet    Take 2 every 15 minutes for blood sugar <70mg/dL. Recheck blood sugar every 15 minutes until above 70mg/dL, then eat a substantial meal.    Type 2  diabetes mellitus with mild nonproliferative retinopathy without macular edema, with long-term current use of insulin, unspecified laterality (H)       guaiFENesin 600 MG 12 hr tablet    MUCINEX    20 tablet    Take 1 tablet (600 mg) by mouth 2 times daily as needed for congestion    Cough       guaiFENesin-codeine 100-10 MG/5ML Soln solution    ROBITUSSIN AC    120 mL    Take 5 mLs by mouth every 6 hours as needed for cough    Cough       ibuprofen 600 MG tablet    ADVIL/MOTRIN    60 tablet    Take 1 tablet (600 mg) by mouth every 4 hours as needed for moderate pain    Closed fracture of one rib of right side, initial encounter       insulin aspart 100 UNIT/ML injection    NovoLOG FLEXPEN    15 mL    Inject 20 Units Subcutaneous 3 times daily (with meals) Once daily, can add additional 5 units if BGs are >500mg/dL.    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, with long-term current use of insulin, unspecified laterality (H)       insulin pen needle 30G X 8 MM    NOVOFINE 30    400 each    USE 4 DAILY OR AS DIRECTED    Type 2 diabetes mellitus with mild nonproliferative retinopathy without macular edema, with long-term current use of insulin, unspecified laterality (H)       ipratropium - albuterol 0.5 mg/2.5 mg/3 mL 0.5-2.5 (3) MG/3ML neb solution    DUONEB    30 vial    Take 1 vial (3 mLs) by nebulization every 6 hours as needed for shortness of breath / dyspnea or wheezing    Wheezing       losartan 100 MG tablet    COZAAR    28 tablet    TAKE 1 TABLET (100MG) BY MOUTH DAILY    Coronary artery disease involving native heart with angina pectoris, unspecified vessel or lesion type (H)       melatonin 5 MG Caps      180 capsule    Take 2 capsules by mouth daily At dinnertime    Insomnia, unspecified type       metoprolol succinate 25 MG 24 hr tablet    TOPROL-XL    30 tablet    Take 1 tablet (25 mg) by mouth daily    Coronary artery disease involving native heart with angina pectoris, unspecified vessel or lesion type (H)       * order for DME     1 each    Hold Novolog if meals are refused.    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    Generalized muscle weakness       order for DME     1 Units    Equipment being ordered: Nebulizer Machine    Wheezing, Cough, Pneumonia due to infectious organism, unspecified laterality, unspecified part of lung       paliperidone 9 MG 24 hr tablet    INVEGA    30 tablet    Take 1 tablet (9 mg) by mouth every morning    Schizoaffective disorder, bipolar type (H)       polyethylene glycol powder    MIRALAX/GLYCOLAX    527 g    TAKE 8.5 GRAMS (1/2 CAPFUL) BY MOUTH DAILY    Constipation, unspecified constipation type       prochlorperazine 5 MG tablet    COMPAZINE    90 tablet    Take 1 tablet (5 mg) by mouth every 6 hours as needed for nausea or vomiting    Nausea       ranitidine 300 MG tablet    ZANTAC    90 tablet    TAKE 1 TABLET (300MG) BY MOUTH AT BEDTIME    Gastroesophageal reflux disease without esophagitis       senna-docusate 8.6-50 MG per tablet    SENOKOT-S;PERICOLACE    100 tablet    Take 1 tablet by mouth 2 times daily as needed for constipation    Drug-induced constipation       sertraline 100 MG tablet    ZOLOFT    60 tablet    Take 2 tablets (200 mg) by mouth daily     Schizoaffective disorder, bipolar type (H)       SUMAtriptan 25 MG tablet    IMITREX    12 tablet    Take 1-2 tablets (25-50 mg) by mouth at onset of headache for migraine May repeat in 2 hours. Max 8 tablets/24 hours.    Migraine with aura and without status migrainosus, not intractable       topiramate 25 MG tablet    TOPAMAX    120 tablet    Take 2 tablets (50 mg) by mouth 2 times daily    Morbid obesity (H)       vitamin D3 39732 UNITS capsule    CHOLECALCIFEROL    12 capsule    TAKE 1 CAPSULE (50,000 UNITS) MONTHLY    Vitamin D deficiency       * Notice:  This list has 2 medication(s) that are the same as other medications prescribed for you. Read the directions carefully, and ask your doctor or other care provider to review them with you.

## 2018-09-17 NOTE — PROGRESS NOTES
I have confirmed the patient's CC, HPI and reviewed Past Medical History, Past Surgical History, Social History, Family History, Problem List, Medication List and agree with Tech note.    CC: Follow up Diabetes mellitus retinopathy    Interval history: VA seems blurrier both eyes. S/p focal laser OS with Dr. Payan at last visit 4/25/18. Marcy flashes or new floaters.    HPI: 57YO F Hx of DMII here for diabetic exam. DMII x 13 years. On oral and insulin. Last hgA1c 6.2% in 5/22/18. Glucose under more control. Hoping to get cataract surgery with Dr. Diaz now.    OCT Macula 6/18/18  OD: center involving intra-retinal fluid temporal macula with slight interval worsening, no subretinal fluid, exudates  OS: temporal intraretinal fluid surrounding an MA    Assessment/plan   1. 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 Payan 4/25/18   - new swelling just nasal to fovea in June that has resolved    - recheck in 6 months       3. Myopia OS with secondary amblyopia              Does not wear glasses normally     4.  Pseudophakia both eyes    - Mild PCO in both eyes    - Monitor    Return 6 months for exam and OCT OU      Tiburcio Martin MD PhD.  Professor & Chair.

## 2018-09-17 NOTE — NURSING NOTE
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 States that VA seems to continue to worsen BE.  Still seeing floaters BE.  No c/o comfort BE.  Pat Cisneros COT 3:31 PM September 17, 2018

## 2018-09-20 ENCOUNTER — HOSPITAL ENCOUNTER (OUTPATIENT)
Dept: PHYSICAL THERAPY | Facility: CLINIC | Age: 58
Setting detail: THERAPIES SERIES
End: 2018-09-20
Attending: NURSE PRACTITIONER
Payer: MEDICARE

## 2018-09-20 PROCEDURE — 40000718 ZZHC STATISTIC PT DEPARTMENT ORTHO VISIT: Performed by: PHYSICAL THERAPIST

## 2018-09-20 PROCEDURE — 97110 THERAPEUTIC EXERCISES: CPT | Mod: GP | Performed by: PHYSICAL THERAPIST

## 2018-09-20 PROCEDURE — G8978 MOBILITY CURRENT STATUS: HCPCS | Mod: GP,CK | Performed by: PHYSICAL THERAPIST

## 2018-09-20 PROCEDURE — G8979 MOBILITY GOAL STATUS: HCPCS | Mod: GP,CI | Performed by: PHYSICAL THERAPIST

## 2018-09-20 PROCEDURE — 97161 PT EVAL LOW COMPLEX 20 MIN: CPT | Mod: GP | Performed by: PHYSICAL THERAPIST

## 2018-09-20 NOTE — PROGRESS NOTES
09/20/18 1000   Quick Adds   Quick Adds Certification   Type of Visit Initial OP PT Evaluation   General Information   Start of Care Date 09/20/18   Referring Physician Rowena Haas, APRN CNP    Orders Evaluate and Treat as Indicated   Order Date 09/06/18   Medical Diagnosis Muscle weakness (generalized) M62.81   Onset of illness/injury or Date of Surgery 09/06/18   Surgical/Medical history reviewed Yes   Pertinent history of current problem (include personal factors and/or comorbidities that impact the POC) Patient returns to resume PT with report of increasing weakness since her discharge in June 2018. She reports she has not been doing her HEP but has been walking with staff. Her tolerance at this time is less than a block but patient reports in the past 1-2 weeks since obtaining her 4WW with a seat, she has been able to go a little farther as she can sit as needed to rest. She report fatigue and pain in hips/knees with walking.    Pertinent Visual History  A1C impacts vison, when high vision is blurry. Better currently.   Prior level of function comment Patient is independent in mobility with walker short distances   Previous/Current Treatment Physical Therapy   Improvement after PT Significant   Current Community Support Home health aid;Transportation service  (assist with meals, housekeeping, meds, baths. )   Patient role/Employment history Disabled   Living environment Group home   Home/Community Accessibility Comments Currently on SSDI living in Adult Foster [facility] setting.  Single level home with no stairs, staffed 24/7.   Current Assistive Devices Four Wheeled Walker   Assistive Devices Comments pt recently got a 4WW, works much better than a cane, per pt   General Information Comments pt attends an adult day program M-F. They go on outings in the community, play cards, attend various groups and go for walks. pt reports they will help her with her HEP if she asks. At times she is  "unable to go on longer outings due to fatigue.   Fall Risk Screen   Fall screen completed by PT   Have you fallen 2 or more times in the past year? No   Have you fallen and had an injury in the past year? No   Timed Up and Go score (seconds) 16.03\" with walker and 13.03\" without AD   Is patient a fall risk? Yes   Pain   Patient currently in pain Yes   Pain location sides B   Pain rating 7/10   Pain description Sharp   Pain comments worse with walking   Cognitive Status Examination   Orientation orientation to person, place and time   Level of Consciousness alert   Follows Commands and Answers Questions able to follow single-step instructions   Personal Safety and Judgment intact   Posture   Posture Forward head position;Protracted shoulders   Strength   Manual Muscle Testing Quick Adds MMT: Hip;MMT: Knee   Strength Comments functional weakness noted with transfers and ambulation. trendelenburg B with gait due to hip weakness.   MMT: Hip, Rehab Eval   Hip Flexion - Left Side (3+/5) fair plus, left   Hip ABduction - Left Side (2+/5) poor plus, left   Hip Flexion - Right Side (3+/5) fair plus, right   Hip ABduction - Right Side (2+/5) poor plus, right   MMT: Knee, Rehab Eval   Knee Flexion - Left Side (5/5) normal,left   Knee Extension - Left Side (5/5) normal,left   Knee Flexion - Right Side (5/5) normal,right   Knee Extension - Right Side (5/5) normal,right   Bed Mobility   Bed Mobility Comments Indep, needing multiple attempts to come to sitting though, difficulty noted.   Transfer Skills   Transfer Comments Independent sit <> stand, able to perform without use of UEs, stablizes independently.   Gait   Gait Comments Amb with forward flexed posture, slowed gait speed, decreased foot clearance B using 4WW.  Amb distances limited to < 1 block due to fatigue and hip pain.   Balance Special Tests   Balance Special Tests Sit to stand reps   Balance Special Tests Sit to Stand Reps in 30 Seconds   Reps in 30 seconds 7 "   Height 18'' chair   Comments no use of hands, limited by fatigue in legs and pain in knees   Sensory Examination   Sensory Perception Comments pt denies numbness or tingling in feet or legs   Muscle Tone   Muscle Tone no deficits were identified   Planned Therapy Interventions   Planned Therapy Interventions balance training;gait training;neuromuscular re-education;strengthening;stretching;transfer training   Clinical Impression   Criteria for Skilled Therapeutic Interventions Met yes, treatment indicated   PT Diagnosis deconditioned status, impaired mobility tolerance   Influenced by the following impairments BLE weakness, especially proximally. impaired gait stability, reduced balance, hip and knee pain   Functional limitations due to impairments   Impaired tolerance with community functional ambulation distances, fall risk, impaired tolerance on stairs, impaired ability to complete ADLs and basic mobility skills on her own safely    Clinical Presentation Stable/Uncomplicated   Clinical Decision Making (Complexity) Low complexity   Therapy Frequency 1 time/week   Predicted Duration of Therapy Intervention (days/wks) 5 weeks   Risk & Benefits of therapy have been explained Yes   Patient, Family & other staff in agreement with plan of care Yes   Education Assessment   Barriers to Learning Cognitive   GOALS   PT Eval Goals 1;2;3;4   Goal 1   Goal Identifier TUG   Goal Description Nena will demo TUG with least restrictive AD in 10 sec or less to demonstrate improving strength, balance, and gait speed, as well as lower risk for falls.   Target Date 10/25/18   Goal 2   Goal Identifier Activity tolerance   Goal Description Nena will be able to walk or step at least a half mile in the clinic without a rest break indicating improved activity tolerance for daily walks with staff and greater ease of participating in outings in the community.   Target Date 10/25/18   Goal 3   Goal Identifier HEP   Goal Description  Nena will demo (I) with [facility] for continued improvement of strength and safety with gait/mobility skills.   Target Date 10/25/18   Goal 4   Goal Identifier Hip strength   Goal Description Patient will have improved glute med strength to at least 3+/5 for greater stability with ambulation and reduced hip pain.   Target Date 10/25/18   Total Evaluation Time   Total Evaluation Time (Minutes) 36   Therapy Certification   Certification date from 09/20/18   Certification date to 10/25/18   Medical Diagnosis Muscle weakness (generalized) M62.81

## 2018-09-24 DIAGNOSIS — Z79.4 TYPE 2 DIABETES MELLITUS WITH DIABETIC NEUROPATHY, WITH LONG-TERM CURRENT USE OF INSULIN (H): ICD-10-CM

## 2018-09-24 DIAGNOSIS — Z79.4 TYPE 2 DIABETES MELLITUS WITH MILD NONPROLIFERATIVE RETINOPATHY WITHOUT MACULAR EDEMA, WITH LONG-TERM CURRENT USE OF INSULIN, UNSPECIFIED LATERALITY (H): ICD-10-CM

## 2018-09-24 DIAGNOSIS — E11.3299 TYPE 2 DIABETES MELLITUS WITH MILD NONPROLIFERATIVE RETINOPATHY WITHOUT MACULAR EDEMA, WITH LONG-TERM CURRENT USE OF INSULIN, UNSPECIFIED LATERALITY (H): ICD-10-CM

## 2018-09-24 DIAGNOSIS — E11.40 TYPE 2 DIABETES MELLITUS WITH DIABETIC NEUROPATHY, WITH LONG-TERM CURRENT USE OF INSULIN (H): ICD-10-CM

## 2018-09-24 DIAGNOSIS — I25.119 CORONARY ARTERY DISEASE INVOLVING NATIVE HEART WITH ANGINA PECTORIS, UNSPECIFIED VESSEL OR LESION TYPE (H): ICD-10-CM

## 2018-09-24 NOTE — TELEPHONE ENCOUNTER
dulaglutide (TRULICITY) 1.5 MG/0.5ML pen  Last Written Prescription Date:  6/11/18  Last Fill Quantity: 2mL,  # refills: 3   Last office visit: 9/6/2018 with prescribing provider:     Future Office Visit:   Next 5 appointments (look out 90 days)     Oct 05, 2018  2:00 PM CDT   Return Visit with ART Arias CNP   [facility] ([facility])    [address]  [address]  [facility] [address]   [phone]            Oct 05, 2018  2:00 PM CDT   Return Visit with Richardson Lopez [facility] ([facility])    [address]  [address]  [facility] [address]   [phone]            Nov 30, 2018  1:30 PM CST   Return Visit with Kraig Pedersen MD   [facility] ([facility])    [address]  [facility] [address] [phone]                 gabapentin (NEURONTIN) 300 MG capsule  Last Written Prescription Date:  8/2/18  Last Fill Quantity: 168,  # refills: 1   Last office visit: 9/6/2018 with prescribing provider:     Future Office Visit:   Next 5 appointments (look out 90 days)     Oct 05, 2018  2:00 PM CDT   Return Visit with ART Arias CNP   [facility] ([facility])    [address]  [address]  [facility] [address]   [phone]            Oct 05, 2018  2:00 PM CDT   Return Visit with Richardson Lopez [facility] ([facility])    [address]  [address]  [facility] [address]   [phone]            Nov 30, 2018  1:30 PM CST   Return Visit with Kraig Pedersen MD   [facility] ([facility])    [address]  [facility] [address]   [phone]                 losartan (COZAAR) 100 MG tablet  Last  Written Prescription Date:  6/6/18  Last Fill Quantity: 28,  # refills: 3   Last office visit: 9/6/2018 with prescribing provider:     Future Office Visit:   Next 5 appointments (look out 90 days)     Oct 05, 2018  2:00 PM CDT   Return Visit with ART Arias CNP   [facility] ([facility])    [address]  [address]  [facility] [address]   [phone]            Oct 05, 2018  2:00 PM CDT   Return Visit with BIN Mondragon   [facility] ([facility])    [address]  [address]  [facility] [address]   [phone]            Nov 30, 2018  1:30 PM CST   Return Visit with Kraig Pedersen MD   [facility] ([facility])    [address]  [facility] [address]   [phone]                 Requested Prescriptions   Pending Prescriptions Disp Refills     dulaglutide (TRULICITY) 1.5 MG/0.5ML pen 2 mL 3     Sig: Inject 1.5 mg Subcutaneous every 7 days    GLP-1 Agonists Protocol Failed    9/24/2018  3:13 PM       Failed - Normal serum creatinine on file in past 12 months    Recent Labs   Lab Test  06/29/18   1333   CR  1.06*            Passed - Blood pressure less than 140/90 in past 6 months    BP Readings from Last 3 Encounters:   09/10/18 135/72   08/22/18 110/58   08/06/18 122/64                Passed - LDL on file in past 12 months    Recent Labs   Lab Test  08/06/18   1038   LDL  84            Passed - Microalbumin on file in past 12 months    Recent Labs   Lab Test  09/06/18   1207   MICROL  <5   UMALCR  Unable to calculate due to low value            Passed - HgbA1C in past 3 or 6 months    If HgbA1C is 8 or greater, it needs to be on file within the past 3 months.  If less than 8, must be on file within the past 6 months.     Recent Labs   Lab Test  08/06/18   1038   A1C  6.4*            Passed - Patient  "is age 18 or older       Passed - No active pregnancy on record       Passed - No positive pregnancy test in past 12 months       Passed - Recent (6 mo) or future (30 days) visit within the authorizing provider's specialty    Patient had office visit in the last 6 months or has a visit in the next 30 days with authorizing provider.  See \"Patient Info\" tab in inbasket, or \"Choose Columns\" in Meds & Orders section of the refill encounter.            gabapentin (NEURONTIN) 300 MG capsule 168 capsule 1     Sig: TAKE 2 CAPSULES (600MG) BY MOUTH THREE TIMES A DAY    There is no refill protocol information for this order        losartan (COZAAR) 100 MG tablet 28 tablet 3     Sig: TAKE 1 TABLET (100MG) BY MOUTH DAILY    Angiotensin-II Receptors Failed    9/24/2018  3:13 PM       Failed - Normal serum creatinine on file in past 12 months    Recent Labs   Lab Test  06/29/18   1333   CR  1.06*            Passed - Blood pressure under 140/90 in past 12 months    BP Readings from Last 3 Encounters:   09/10/18 135/72   08/22/18 110/58   08/06/18 122/64                Passed - Recent (12 mo) or future (30 days) visit within the authorizing provider's specialty    Patient had office visit in the last 12 months or has a visit in the next 30 days with authorizing provider or within the authorizing provider's specialty.  See \"Patient Info\" tab in inbasket, or \"Choose Columns\" in Meds & Orders section of the refill encounter.           Passed - Patient is age 18 or older       Passed - No active pregnancy on record       Passed - Normal serum potassium on file in past 12 months    Recent Labs   Lab Test  06/29/18   1333   POTASSIUM  4.7                   Passed - No positive pregnancy test in past 12 months          "

## 2018-09-25 NOTE — NURSING NOTE
"Nena Tang is a 57 year old female.      Chief Complaint   Patient presents with     Urgent Care     Pain     pt is here for a pain on her right side - starts in her back anbd comes around - starte a couple days ago - hurts to get out of bed - NKI       Initial /80 (BP Location: Right arm, Cuff Size: Adult Large)  Pulse 86  Temp 98.2  F (36.8  C) (Oral)  Resp 18  Wt 225 lb (102.1 kg)  LMP 01/06/2015  SpO2 99%  BMI 42.51 kg/m2 Estimated body mass index is 42.51 kg/(m^2) as calculated from the following:    Height as of 6/30/17: 5' 1\" (1.549 m).    Weight as of this encounter: 225 lb (102.1 kg).  Medication Reconciliation: complete      Questioned patient about current smoking habits.  Pt. has never smoked.      Kae Terry CMA      " Patient is a 86y old  Female who presents with a chief complaint of Abdominal pain (25 Sep 2018 05:58)      INTERVAL HPI/OVERNIGHT EVENTS: no events overnight     MEDICATIONS  (STANDING):  carbidopa/levodopa  25/100 1 Tablet(s) Oral <User Schedule>  dextrose 5%. 1000 milliLiter(s) (50 mL/Hr) IV Continuous <Continuous>  escitalopram 5 milliGRAM(s) Oral daily  fluconAZOLE IVPB 100 milliGRAM(s) IV Intermittent every 24 hours  fluconAZOLE IVPB      heparin  Injectable 5000 Unit(s) SubCutaneous every 12 hours  hydroxychloroquine 200 milliGRAM(s) Oral daily  influenza   Vaccine 0.5 milliLiter(s) IntraMuscular once  mirtazapine 15 milliGRAM(s) Oral at bedtime  pantoprazole  Injectable 40 milliGRAM(s) IV Push daily  piperacillin/tazobactam IVPB. 3.375 Gram(s) IV Intermittent every 8 hours  potassium phosphate IVPB 15 milliMole(s) IV Intermittent once    MEDICATIONS  (PRN):  acetaminophen   Tablet .. 650 milliGRAM(s) Oral every 6 hours PRN Temp greater or equal to 38C (100.4F), Mild Pain (1 - 3)  morphine  - Injectable 2 milliGRAM(s) IV Push every 4 hours PRN Severe Pain (7 - 10)  ondansetron Injectable 4 milliGRAM(s) IV Push every 6 hours PRN Nausea and/or Vomiting  oxyCODONE    5 mG/acetaminophen 325 mG 1 Tablet(s) Oral every 4 hours PRN Moderate Pain (4 - 6)      Allergies    No Known Allergies    Intolerances         Vital Signs Last 24 Hrs  T(C): 36.8 (25 Sep 2018 12:01), Max: 36.8 (25 Sep 2018 12:01)  T(F): 98.2 (25 Sep 2018 12:01), Max: 98.2 (25 Sep 2018 12:01)  HR: 89 (25 Sep 2018 12:01) (61 - 110)  BP: 106/68 (25 Sep 2018 12:01) (106/68 - 132/78)  BP(mean): 82 (24 Sep 2018 22:00) (75 - 89)  RR: 18 (25 Sep 2018 12:01) (13 - 31)  SpO2: 97% (25 Sep 2018 12:01) (95% - 100%)    GENERAL: NAD, well-groomed, well-developed, frail elderly female   HEAD:  Atraumatic, Normocephalic  EYES: EOMI, PERRLA, conjunctiva and sclera clear  ENMT: No tonsillar erythema, exudates, or enlargement; Moist mucous membranes, Good dentition, No lesions  NECK: Supple, No JVD, Normal thyroid  NERVOUS SYSTEM:  Alert & Oriented X3, Good concentration; Motor Strength 5/5 B/L upper and lower extremities; DTRs 2+ intact and symmetric  CHEST/LUNG: Clear to percussion bilaterally; No rales, rhonchi, wheezing, or rubs  HEART: Regular rate and rhythm; No murmurs, rubs, or gallops  ABDOMEN: Soft, Ostomy bag w/stool in it. Surgical wounds dressed. + BS  EXTREMITIES:  2+ Peripheral Pulses, No clubbing, cyanosis, or edema  LYMPH: No lymphadenopathy noted  LABS:                        9.9    8.71  )-----------( 207      ( 25 Sep 2018 10:17 )             30.4     09-25    139  |  105  |  7   ----------------------------<  141<H>  2.8<LL>   |  27  |  0.56    Ca    6.6<L>      25 Sep 2018 10:17  Phos  1.1     09-25  Mg     1.8     09-25          CAPILLARY BLOOD GLUCOSE      POCT Blood Glucose.: 109 mg/dL (24 Sep 2018 17:35)      RADIOLOGY & ADDITIONAL TESTS:    Imaging Personally Reviewed:  [ X] YES  [ ] NO    Consultant(s) Notes Reviewed:  [ X] YES  [ ] NO    Care Discussed with Consultants/Other Providers [X ] YES  [ ] NO

## 2018-09-26 RX ORDER — LOSARTAN POTASSIUM 100 MG/1
TABLET ORAL
Qty: 28 TABLET | Refills: 3 | Status: SHIPPED | OUTPATIENT
Start: 2018-09-26 | End: 2019-01-14

## 2018-09-26 NOTE — TELEPHONE ENCOUNTER
Medication Refill Request    Medication(s) requested:  dulaglutide (TRULICITY) 1.5 MG/0.5ML pen  losartan (COZAAR) 100 MG tablet    Last Office Visit: 9/6/18  Next Office Visit: 10/5/18  Labs: see below       Last creat drawn on 6/29/18-- elevated at 1.06, stable    Last 3 BPs on record as follows:  9/10/18-- 135/72  8/22/18-- 110/58  8/6/18-- 122/64    Rx approved and refilled per [facility] refill protocol.    ---------------------------    Refill Request for gabapentin (NEURONTIN) 300 MG capsule   Last refill: 8/29/18-- #168 for 28 day supply      Documentation in problem list reviewed:  Yes  Processing:  electronically send to patient's pharmacy    [place] reviewed:  reviewed- no concerns      Corine Cunha RN  09/26/18  9:20 AM

## 2018-09-27 RX ORDER — GABAPENTIN 300 MG/1
CAPSULE ORAL
Qty: 168 CAPSULE | Refills: 1 | Status: SHIPPED | OUTPATIENT
Start: 2018-09-27 | End: 2018-11-16

## 2018-10-01 DIAGNOSIS — I25.119 CORONARY ARTERY DISEASE INVOLVING NATIVE HEART WITH ANGINA PECTORIS, UNSPECIFIED VESSEL OR LESION TYPE (H): ICD-10-CM

## 2018-10-01 NOTE — TELEPHONE ENCOUNTER
"metoprolol succinate (TOPROL-XL) 25 MG 24 hr tablet  Requested Prescriptions  Last Written Prescription Date:  8/2/18  Last Fill Quantity: 30,  # refills: 1   Last office visit: 9/6/2018 with prescribing provider:     Future Office Visit:   Next 5 appointments (look out 90 days)     Oct 05, 2018  9:00 AM CDT   Return Visit with BIN Mondragon   [facility] ([facility])    [address]  [address]  [facility] [address]   [phone]            Oct 05, 2018  9:00 AM CDT   [facility] Visit with ART Arias CNP   [facility]    [address]  [address]  [facility] [address]   [phone]            Nov 30, 2018  1:30 PM CST   Return Visit with Kraig Pedersen MD   [facility]    [address]  [facility] [address]   [phone]                    Pending Prescriptions Disp Refills     metoprolol succinate (TOPROL-XL) 25 MG 24 hr tablet 30 tablet 1     Sig: Take 1 tablet (25 mg) by mouth daily    Beta-Blockers Protocol Passed    10/1/2018 10:43 AM       Passed - Blood pressure under 140/90 in past 12 months    BP Readings from Last 3 Encounters:   09/10/18 135/72   08/22/18 110/58   08/06/18 122/64                Passed - Patient is age 6 or older       Passed - Recent (12 mo) or future (30 days) visit within the authorizing provider's specialty    Patient had office visit in the last 12 months or has a visit in the next 30 days with authorizing provider or within the authorizing provider's specialty.  See \"Patient Info\" tab in inbasket, or \"Choose Columns\" in Meds & Orders section of the refill encounter.              "

## 2018-10-02 RX ORDER — METOPROLOL SUCCINATE 25 MG/1
25 TABLET, EXTENDED RELEASE ORAL DAILY
Qty: 30 TABLET | Refills: 1 | Status: SHIPPED | OUTPATIENT
Start: 2018-10-02 | End: 2018-11-27

## 2018-10-02 NOTE — TELEPHONE ENCOUNTER
Prescription approved per [facility] Refill Protocol.    Jolie Stephenson RN  [facility]

## 2018-10-05 ENCOUNTER — OFFICE VISIT (OUTPATIENT)
Dept: BEHAVIORAL HEALTH | Facility: CLINIC | Age: 58
End: 2018-10-05
Payer: MEDICARE

## 2018-10-05 ENCOUNTER — NURSE TRIAGE (OUTPATIENT)
Dept: NURSING | Facility: CLINIC | Age: 58
End: 2018-10-05

## 2018-10-05 ENCOUNTER — OFFICE VISIT (OUTPATIENT)
Dept: FAMILY MEDICINE | Facility: CLINIC | Age: 58
End: 2018-10-05
Payer: MEDICARE

## 2018-10-05 VITALS
HEART RATE: 79 BPM | DIASTOLIC BLOOD PRESSURE: 78 MMHG | WEIGHT: 238 LBS | BODY MASS INDEX: 45.52 KG/M2 | RESPIRATION RATE: 16 BRPM | SYSTOLIC BLOOD PRESSURE: 130 MMHG | OXYGEN SATURATION: 92 % | TEMPERATURE: 97.3 F

## 2018-10-05 DIAGNOSIS — F25.1 SCHIZOAFFECTIVE DISORDER, DEPRESSIVE TYPE (H): Primary | ICD-10-CM

## 2018-10-05 DIAGNOSIS — Z79.4 TYPE 2 DIABETES MELLITUS WITH STAGE 3 CHRONIC KIDNEY DISEASE, WITH LONG-TERM CURRENT USE OF INSULIN (H): ICD-10-CM

## 2018-10-05 DIAGNOSIS — R60.0 BILATERAL LEG EDEMA: ICD-10-CM

## 2018-10-05 DIAGNOSIS — E66.01 MORBID OBESITY (H): ICD-10-CM

## 2018-10-05 DIAGNOSIS — Z23 NEED FOR PROPHYLACTIC VACCINATION AND INOCULATION AGAINST INFLUENZA: Primary | ICD-10-CM

## 2018-10-05 DIAGNOSIS — R35.0 URINARY FREQUENCY: ICD-10-CM

## 2018-10-05 DIAGNOSIS — E11.22 TYPE 2 DIABETES MELLITUS WITH STAGE 3 CHRONIC KIDNEY DISEASE, WITH LONG-TERM CURRENT USE OF INSULIN (H): ICD-10-CM

## 2018-10-05 DIAGNOSIS — F43.10 POSTTRAUMATIC STRESS DISORDER: ICD-10-CM

## 2018-10-05 DIAGNOSIS — N18.30 TYPE 2 DIABETES MELLITUS WITH STAGE 3 CHRONIC KIDNEY DISEASE, WITH LONG-TERM CURRENT USE OF INSULIN (H): ICD-10-CM

## 2018-10-05 LAB
ALBUMIN UR-MCNC: NEGATIVE MG/DL
APPEARANCE UR: CLEAR
BACTERIA #/AREA URNS HPF: ABNORMAL /HPF
BILIRUB UR QL STRIP: NEGATIVE
COLOR UR AUTO: YELLOW
GLUCOSE UR STRIP-MCNC: NEGATIVE MG/DL
HGB UR QL STRIP: NEGATIVE
KETONES UR STRIP-MCNC: NEGATIVE MG/DL
LEUKOCYTE ESTERASE UR QL STRIP: ABNORMAL
NITRATE UR QL: NEGATIVE
NON-SQ EPI CELLS #/AREA URNS LPF: ABNORMAL /LPF
PH UR STRIP: 7 PH (ref 5–7)
RBC #/AREA URNS AUTO: ABNORMAL /HPF
SOURCE: ABNORMAL
SP GR UR STRIP: 1.02 (ref 1–1.03)
UROBILINOGEN UR STRIP-ACNC: 1 EU/DL (ref 0.2–1)
WBC #/AREA URNS AUTO: ABNORMAL /HPF

## 2018-10-05 PROCEDURE — 81001 URINALYSIS AUTO W/SCOPE: CPT | Performed by: NURSE PRACTITIONER

## 2018-10-05 PROCEDURE — 99215 OFFICE O/P EST HI 40 MIN: CPT | Performed by: NURSE PRACTITIONER

## 2018-10-05 PROCEDURE — 90832 PSYTX W PT 30 MINUTES: CPT | Performed by: SOCIAL WORKER

## 2018-10-05 NOTE — MR AVS SNAPSHOT
After Visit Summary   10/5/2018    Nena Tang    MRN: [id number]           Patient Information     Date Of Birth          1960        Visit Information        Provider Department      10/5/2018 9:00 AM Rowena Haas APRN CNP [facility]        Today's Diagnoses     Need for prophylactic vaccination and inoculation against influenza    -  1    Bilateral leg edema        Urinary frequency          Care Instructions    -Will call you with results of urine.   -Use compression socks during the day time.   -Call clinic with any questions or concerns.             Follow-ups after your visit        Follow-up notes from your care team     Return in about 4 weeks (around 11/2/2018) for Diabetes, Med check, Mental Health.      Your next 10 appointments already scheduled     Oct 08, 2018  2:30 PM CDT   Ortho Treatment with Kiko Gallego PT   [facility] ([facility])    [address] [address]   [phone]            Oct 15, 2018  1:00 PM CDT   Ortho Treatment with Kiko Gallego PT   [facility] ([facility])    [address] [address]   [phone]            Oct 22, 2018 10:30 AM CDT   (Arrive by 10:15 AM)   CT LUMBAR SPINE W/O CONTRAST with RSCCCT1   [facility] ([facility])    [address]  [facility] [id number] [phone]           How do I prepare for my exam? (Food and drink instructions) No Food and Drink Restrictions.  How do I prepare for my exam? (Other instructions) You do not need to do anything special to prepare for this exam. For a sinus scan: Use your nose spray (nasal decongestant spray) as directed.  What should I wear: Please wear loose clothing, such as a sweat suit or jogging clothes. Avoid snaps, zippers and other metal. We may ask you to undress and put on  a hospital gown.  How long does the exam take: Most scans take less than 20 minutes.  What should I bring: Please bring any scans or X-rays taken at other hospitals, if similar tests were done. Also bring a list of your medicines, including vitamins, minerals and over-the-counter drugs. It is safest to leave personal items at home.  Do I need a : No  is needed.  What do I need to tell my doctor? Be sure to tell your doctor: * If you have any allergies. * If there s any chance you are pregnant. * If you are breastfeeding.  What should I do after the exam: No restrictions, You may resume normal activities.  What is this test: A CT (computed tomography) scan is a series of pictures that allows us to look inside your body. The scanner creates images of the body in cross sections, much like slices of bread. This helps us see any problems more clearly.  Who should I call with questions: If you have any questions, please call the Imaging Department where you will have your exam. Directions, parking instructions, and other information is available on our website, [email or web address].            Oct 22, 2018  1:00 PM CDT   Ortho Treatment with Kiko Magdalenoon, PT   [facility] ([facility])    [address] [address]   [phone]            Oct 29, 2018  1:00 PM CDT   Ortho Treatment with Kiko Magdalenoon, PT   [facility] ([facility])    [address] [address]   [phone]            Nov 30, 2018  1:30 PM CST   Return Visit with Kraig Pedersen MD   [facility] ([facility])    [address]  [facility] [address]   [phone]            Mar 14, 2019  3:15 PM CDT   RETURN RETINA with Tiburcio Chacon MD   [facility] ([facility])    [address]  [address] [address]  [facility]  [id number] [phone]              Who to contact     If you have questions or need follow up information about today's clinic visit or your schedule please contact [facility] directly at [phone].  Normal or non-critical lab and imaging results will be communicated to you by Anergishart, letter or phone within 4 business days after the clinic has received the results. If you do not hear from us within 7 days, please contact the clinic through Anergishart or phone. If you have a critical or abnormal lab result, we will notify you by phone as soon as possible.  Submit refill requests through [employer] or call your pharmacy and they will forward the refill request to us. Please allow 3 business days for your refill to be completed.          Additional Information About Your Visit        [employer] Information     [employer] gives you secure access to your electronic health record. If you see a primary care provider, you can also send messages to your care team and make appointments. If you have questions, please call your primary care clinic.  If you do not have a primary care provider, please call [phone] and they will assist you.        Care EveryWhere ID     This is your Care EveryWhere ID. This could be used by other organizations to access your [place] medical records  [id number]        Your Vitals Were     Pulse Temperature Respirations Last Period Pulse Oximetry BMI (Body Mass Index)    79 97.3  F (36.3  C) (Temporal) 16 01/06/2015 92% 45.52 kg/m2       Blood Pressure from Last 3 Encounters:   10/05/18 130/78   09/10/18 135/72   08/22/18 110/58    Weight from Last 3 Encounters:   10/05/18 238 lb (108 kg)   09/10/18 237 lb 8 oz (107.7 kg)   09/06/18 240 lb (108.9 kg)              We Performed the Following     UA with Microscopic reflex to Culture          Today's Medication Changes          These changes are accurate as of 10/5/18  9:32 AM.  If you have any questions, ask your  nurse or doctor.               Start taking these medicines.        Dose/Directions    order for DME   Used for:  Bilateral leg edema   Started by:  Rowena Haas APRN CNP        Equipment being ordered: Compression socks.   Quantity:  2 each   Refills:  0            Where to get your medicines      Some of these will need a paper prescription and others can be bought over the counter.  Ask your nurse if you have questions.     Bring a paper prescription for each of these medications     order for DME                Primary Care Provider Office Phone # Fax #    ART Arias [phone]       [address] [place] [address]  [facility] [id number]        Goals        General    Medical (pt-stated)     Notes - Note created  7/7/2016  9:15 AM by Chelsea Pulido, RN    Get blood sugars under control    Improve medication compliance    As of today's date 7/7/2016 goal is met at 0 - 25%.   Goal Status:  Active          Equal Access to Services     [facility]: Hadii aad ku hadasho Soomaali, waaxda luqadaha, qaybta kaalmada adeegyada, waxay idiin hayaafredi kimbelarasae ellison . So [facility] [phone].    ATENCIÓN: Si habla español, tiene a trinh disposición servicios gratuitos de asistencia lingüística. Llame al [phone].    We comply with applicable federal civil rights laws and [place] laws. We do not discriminate on the basis of race, color, national origin, age, disability, sex, sexual orientation, or gender identity.            Thank you!     Thank you for choosing [facility]  for your care. Our goal is always to provide you with excellent care. Hearing back from our patients is one way we can continue to improve our services. Please take a few minutes to complete the written survey that you may receive in the mail after your visit with us. Thank you!             Your Updated Medication List - Protect others around you: Learn how to safely use, store and throw  away your medicines at www.disposemymeds.org.          This list is accurate as of 10/5/18  9:32 AM.  Always use your most recent med list.                   Brand Name Dispense Instructions for use Diagnosis    acetaminophen 500 MG tablet    TYLENOL    100 tablet    Take 2 tablets (1,000 mg) by mouth 3 times daily as needed for mild pain    Chronic low back pain, unspecified back pain laterality, with sciatica presence unspecified       albuterol 108 (90 Base) MCG/ACT inhaler    PROAIR HFA/PROVENTIL HFA/VENTOLIN HFA    1 Inhaler    Inhale 2 puffs into the lungs every 6 hours as needed for shortness of breath / dyspnea or wheezing    Pneumonia, organism unspecified(486)       aspirin 81 MG EC tablet     28 tablet    TAKE 1 TABLET (81MG) BY MOUTH DAILY    Coronary artery disease involving native heart with angina pectoris, unspecified vessel or lesion type (H)       atorvastatin 80 MG tablet    LIPITOR    30 tablet    TAKE 1 TABLET (80MG) BY MOUTH DAILY    Hyperlipidemia LDL goal <100       benztropine 0.5 MG tablet    COGENTIN    60 tablet    Take 1 tablet (0.5 mg) by mouth 2 times daily    Extrapyramidal and movement disorder       blood glucose monitoring lancets     150 each    Use to test blood sugar 2 times daily or as directed.  Ok to substitute alternative if insurance prefers.    Type 2 diabetes mellitus with diabetic neuropathy, with long-term current use of insulin (H)       blood glucose monitoring test strip    ONETOUCH VERIO IQ    200 strip    Use to test blood sugar 4 times daily .  Ok to substitute alternative if insurance prefers.    Type 2 diabetes mellitus with diabetic neuropathy, with long-term current use of insulin (H)       calcium carbonate 600 mg (elemental) 1500 (600 Ca) MG tablet    OS-ALLEN    180 tablet    Take 1 tablet (1,500 mg) by mouth daily    Other osteoporosis without current pathological fracture       clotrimazole 1 % cream    LOTRIMIN     Apply topically 2 times daily         DIPHENDRYL PO      Take 25 mg by mouth every 6 hours as needed for itching        dulaglutide 1.5 MG/0.5ML pen    TRULICITY    2 mL    Inject 1.5 mg Subcutaneous every 7 days    Type 2 diabetes mellitus with mild nonproliferative retinopathy without macular edema, with long-term current use of insulin, unspecified laterality (H)       fluticasone 50 MCG/ACT spray    FLONASE    1 Bottle    Spray 1-2 sprays into both nostrils daily    Seasonal allergic rhinitis, unspecified chronicity, unspecified trigger       gabapentin 300 MG capsule    NEURONTIN    168 capsule    TAKE 2 CAPSULES (600MG) BY MOUTH THREE TIMES A DAY    Type 2 diabetes mellitus with diabetic neuropathy, with long-term current use of insulin (H)       glucose 4 g Chew chewable tablet     20 tablet    Take 2 every 15 minutes for blood sugar <70mg/dL. Recheck blood sugar every 15 minutes until above 70mg/dL, then eat a substantial meal.    Type 2 diabetes mellitus with mild nonproliferative retinopathy without macular edema, with long-term current use of insulin, unspecified laterality (H)       guaiFENesin 600 MG 12 hr tablet    MUCINEX    20 tablet    Take 1 tablet (600 mg) by mouth 2 times daily as needed for congestion    Cough       guaiFENesin-codeine 100-10 MG/5ML Soln solution    ROBITUSSIN AC    120 mL    Take 5 mLs by mouth every 6 hours as needed for cough    Cough       ibuprofen 600 MG tablet    ADVIL/MOTRIN    60 tablet    Take 1 tablet (600 mg) by mouth every 4 hours as needed for moderate pain    Closed fracture of one rib of right side, initial encounter       insulin aspart 100 UNIT/ML injection    NovoLOG FLEXPEN    15 mL    Inject 20 Units Subcutaneous 3 times daily (with meals) Once daily, can add additional 5 units if BGs are >500mg/dL.    Type 2 diabetes mellitus with mild nonproliferative retinopathy without macular edema, with long-term current use of insulin, unspecified laterality (H)       insulin glargine 100 UNIT/ML  injection    LANTUS    3 mL    Inject 48 Units Subcutaneous At Bedtime    Type 2 diabetes mellitus with mild nonproliferative retinopathy without macular edema, with long-term current use of insulin, unspecified laterality (H)       insulin pen needle 30G X 8 MM    NOVOFINE 30    400 each    USE 4 DAILY OR AS DIRECTED    Type 2 diabetes mellitus with mild nonproliferative retinopathy without macular edema, with long-term current use of insulin, unspecified laterality (H)       ipratropium - albuterol 0.5 mg/2.5 mg/3 mL 0.5-2.5 (3) MG/3ML neb solution    DUONEB    30 vial    Take 1 vial (3 mLs) by nebulization every 6 hours as needed for shortness of breath / dyspnea or wheezing    Wheezing       losartan 100 MG tablet    COZAAR    28 tablet    TAKE 1 TABLET (100MG) BY MOUTH DAILY    Coronary artery disease involving native heart with angina pectoris, unspecified vessel or lesion type (H)       melatonin 5 MG Caps     180 capsule    Take 2 capsules by mouth daily At dinnertime    Insomnia, unspecified type       metoprolol succinate 25 MG 24 hr tablet    TOPROL-XL    30 tablet    Take 1 tablet (25 mg) by mouth daily    Coronary artery disease involving native heart with angina pectoris, unspecified vessel or lesion type (H)       * order for DME     1 each    Hold Novolog if meals are refused.    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    Generalized muscle weakness       order for DME     1 Units    Equipment being ordered: Nebulizer Machine    Wheezing, Cough, Pneumonia due to infectious organism,  unspecified laterality, unspecified part of lung       order for DME     2 each    Equipment being ordered: Compression socks.    Bilateral leg edema       paliperidone 9 MG 24 hr tablet    INVEGA    30 tablet    Take 1 tablet (9 mg) by mouth every morning    Schizoaffective disorder, bipolar type (H)       polyethylene glycol powder    MIRALAX/GLYCOLAX    527 g    TAKE 8.5 GRAMS (1/2 CAPFUL) BY MOUTH DAILY    Constipation, unspecified constipation type       prochlorperazine 5 MG tablet    COMPAZINE    90 tablet    Take 1 tablet (5 mg) by mouth every 6 hours as needed for nausea or vomiting    Nausea       ranitidine 300 MG tablet    ZANTAC    90 tablet    TAKE 1 TABLET (300MG) BY MOUTH AT BEDTIME    Gastroesophageal reflux disease without esophagitis       senna-docusate 8.6-50 MG per tablet    SENOKOT-S;PERICOLACE    100 tablet    Take 1 tablet by mouth 2 times daily as needed for constipation    Drug-induced constipation       sertraline 100 MG tablet    ZOLOFT    60 tablet    Take 2 tablets (200 mg) by mouth daily    Schizoaffective disorder, bipolar type (H)       SUMAtriptan 25 MG tablet    IMITREX    12 tablet    Take 1-2 tablets (25-50 mg) by mouth at onset of headache for migraine May repeat in 2 hours. Max 8 tablets/24 hours.    Migraine with aura and without status migrainosus, not intractable       topiramate 25 MG tablet    TOPAMAX    120 tablet    Take 2 tablets (50 mg) by mouth 2 times daily    Morbid obesity (H)       vitamin D3 64455 UNITS capsule    CHOLECALCIFEROL    12 capsule    TAKE 1 CAPSULE (50,000 UNITS) MONTHLY    Vitamin D deficiency       * Notice:  This list has 2 medication(s) that are the same as other medications prescribed for you. Read the directions carefully, and ask your doctor or other care provider to review them with you.

## 2018-10-05 NOTE — MR AVS SNAPSHOT
After Visit Summary   10/5/2018    Nena Tang    MRN: [id number]           Patient Information     Date Of Birth          1960        Visit Information        Provider Department      10/5/2018 9:00 AM Richardson Lopez, [facility]        Today's Diagnoses     Schizoaffective disorder, depressive type (H)    -  1    Posttraumatic stress disorder           Follow-ups after your visit        Your next 10 appointments already scheduled     Oct 08, 2018  2:30 PM CDT   Ortho Treatment with Kiko Gallego, PT   [facility] ([facility])    [address] [facility] [address]   [phone]            Oct 15, 2018  1:00 PM CDT   Ortho Treatment with Kiko Gallego, PT   [facility] ([facility])    [address] [address]   [phone]            Oct 22, 2018 10:30 AM CDT   (Arrive by 10:15 AM)   CT LUMBAR SPINE W/O CONTRAST with [address] ([facility])    [address]  [facility] [address]   [phone]           How do I prepare for my exam? (Food and drink instructions) No Food and Drink Restrictions.  How do I prepare for my exam? (Other instructions) You do not need to do anything special to prepare for this exam. For a sinus scan: Use your nose spray (nasal decongestant spray) as directed.  What should I wear: Please wear loose clothing, such as a sweat suit or jogging clothes. Avoid snaps, zippers and other metal. We may ask you to undress and put on a hospital gown.  How long does the exam take: Most scans take less than 20 minutes.  What should I bring: Please bring any scans or X-rays taken at other hospitals, if similar tests were done. Also bring a list of your medicines, including vitamins, minerals and over-the-counter drugs. It is safest to leave personal items  at home.  Do I need a : No  is needed.  What do I need to tell my doctor? Be sure to tell your doctor: * If you have any allergies. * If there s any chance you are pregnant. * If you are breastfeeding.  What should I do after the exam: No restrictions, You may resume normal activities.  What is this test: A CT (computed tomography) scan is a series of pictures that allows us to look inside your body. The scanner creates images of the body in cross sections, much like slices of bread. This helps us see any problems more clearly.  Who should I call with questions: If you have any questions, please call the Imaging Department where you will have your exam. Directions, parking instructions, and other information is available on our website, [email or web address].            Oct 22, 2018  1:00 PM CDT   Ortho Treatment with Kiko Gallego, CISCO   [facility] ([facility])    [address] [address]   [phone]            Oct 29, 2018  1:00 PM CDT   Ortho Treatment with Kiko Gallego, CISCO   [facility] ([facility])    [address] [address]   [phone]            Nov 01, 2018  9:30 AM CDT   Return Visit with ART Arias [facility] ([facility])    [address] [place] [address]  [address] [place] [address] [phone]            Nov 01, 2018  9:30 AM CDT   Return Visit with Richardson Lopez [facility] ([facility])    [address] [place] [address]  [address] [place] [address] [phone]            Nov 01, 2018  9:30 AM CDT   Office Visit with Eugenia De Jesus [facility] ([facility])    [address] [place] [address]  [address] [place] [address] [phone]           Bring  a current list of meds and any records pertaining to this visit. For Physicals, please bring immunization records and any forms needing to be filled out. Please arrive 10 minutes early to complete paperwork.            Nov 30, 2018  1:30 PM CST   Return Visit with Kraig Pedersen MD   [facility] ([facility])    [address]  [facility] [address]   [phone]            Mar 14, 2019  3:15 PM CDT   RETURN RETINA with Tiburcio Chacon MD   [facility] ([facility])    [address]  [address]  [facility] [id number] [phone]              Who to contact     If you have questions or need follow up information about today's clinic visit or your schedule please contact [facility] directly at [phone].  Normal or non-critical lab and imaging results will be communicated to you by MyChart, letter or phone within 4 business days after the clinic has received the results. If you do not hear from us within 7 days, please contact the clinic through MyChart or phone. If you have a critical or abnormal lab result, we will notify you by phone as soon as possible.  Submit refill requests through [employer] or call your pharmacy and they will forward the refill request to us. Please allow 3 business days for your refill to be completed.          Additional Information About Your Visit        MyChart Information     [employer] gives you secure access to your electronic health record. If you see a primary care provider, you can also send messages to your care team and make appointments. If you have questions, please call your primary care clinic.  If you do not have a primary care provider, please call [phone] and they will assist you.        Care EveryWhere ID     This is your Care EveryWhere ID. This could be used by other organizations to access your [facility]  records  [id number]        Your Vitals Were     Last Period                   01/06/2015            Blood Pressure from Last 3 Encounters:   10/05/18 130/78   09/10/18 135/72   08/22/18 110/58    Weight from Last 3 Encounters:   10/05/18 238 lb (108 kg)   09/10/18 237 lb 8 oz (107.7 kg)   09/06/18 240 lb (108.9 kg)              Today, you had the following     No orders found for display         Today's Medication Changes          These changes are accurate as of 10/5/18 12:08 PM.  If you have any questions, ask your nurse or doctor.               Start taking these medicines.        Dose/Directions    order for DME   Used for:  Bilateral leg edema   Started by:  Rowena Haas APRN CNP        Equipment being ordered: Compression socks.   Quantity:  2 each   Refills:  0            Where to get your medicines      Some of these will need a paper prescription and others can be bought over the counter.  Ask your nurse if you have questions.     Bring a paper prescription for each of these medications     order for DME                Primary Care Provider Office Phone # Fax #    ART Arias [phone]       [address] [place] [address]        Goals        General    Medical (pt-stated)     Notes - Note created  7/7/2016  9:15 AM by Chelsea Pulido, RN    Get blood sugars under control    Improve medication compliance    As of today's date 7/7/2016 goal is met at 0 - 25%.   Goal Status:  Active          Equal Access to Services     RAMESH GARRIDO AH: Hadii samira wadeo Sodelphine, waaxda luqadaha, qaybta kaalmada adeegyada, waxmichelle dorcas marroquin ademonica ellison . So [facility] [phone].    ATENCIÓN: Si habla español, tiene a trinh disposición servicios gratuitos de asistencia lingüística. Llame al [phone].    We comply with applicable federal civil rights laws and [place] laws. We do not discriminate on the basis of race, color, national origin, age, disability,  sex, sexual orientation, or gender identity.            Thank you!     Thank you for choosing [facility]  for your care. Our goal is always to provide you with excellent care. Hearing back from our patients is one way we can continue to improve our services. Please take a few minutes to complete the written survey that you may receive in the mail after your visit with us. Thank you!             Your Updated Medication List - Protect others around you: Learn how to safely use, store and throw away your medicines at www.disposemymeds.org.          This list is accurate as of 10/5/18 12:08 PM.  Always use your most recent med list.                   Brand Name Dispense Instructions for use Diagnosis    acetaminophen 500 MG tablet    TYLENOL    100 tablet    Take 2 tablets (1,000 mg) by mouth 3 times daily as needed for mild pain    Chronic low back pain, unspecified back pain laterality, with sciatica presence unspecified       albuterol 108 (90 Base) MCG/ACT inhaler    PROAIR HFA/PROVENTIL HFA/VENTOLIN HFA    1 Inhaler    Inhale 2 puffs into the lungs every 6 hours as needed for shortness of breath / dyspnea or wheezing    Pneumonia, organism unspecified(486)       aspirin 81 MG EC tablet     28 tablet    TAKE 1 TABLET (81MG) BY MOUTH DAILY    Coronary artery disease involving native heart with angina pectoris, unspecified vessel or lesion type (H)       atorvastatin 80 MG tablet    LIPITOR    30 tablet    TAKE 1 TABLET (80MG) BY MOUTH DAILY    Hyperlipidemia LDL goal <100       benztropine 0.5 MG tablet    COGENTIN    60 tablet    Take 1 tablet (0.5 mg) by mouth 2 times daily    Extrapyramidal and movement disorder       blood glucose monitoring lancets     150 each    Use to test blood sugar 2 times daily or as directed.  Ok to substitute alternative if insurance prefers.    Type 2 diabetes mellitus with diabetic neuropathy, with long-term current use of insulin (H)       blood glucose  monitoring test strip    ONETOUCH VERIO IQ    200 strip    Use to test blood sugar 4 times daily .  Ok to substitute alternative if insurance prefers.    Type 2 diabetes mellitus with diabetic neuropathy, with long-term current use of insulin (H)       calcium carbonate 600 mg (elemental) 1500 (600 Ca) MG tablet    OS-ALLEN    180 tablet    Take 1 tablet (1,500 mg) by mouth daily    Other osteoporosis without current pathological fracture       clotrimazole 1 % cream    LOTRIMIN     Apply topically 2 times daily        DIPHENDRYL PO      Take 25 mg by mouth every 6 hours as needed for itching        dulaglutide 1.5 MG/0.5ML pen    TRULICITY    2 mL    Inject 1.5 mg Subcutaneous every 7 days    Type 2 diabetes mellitus with mild nonproliferative retinopathy without macular edema, with long-term current use of insulin, unspecified laterality (H)       fluticasone 50 MCG/ACT spray    FLONASE    1 Bottle    Spray 1-2 sprays into both nostrils daily    Seasonal allergic rhinitis, unspecified chronicity, unspecified trigger       gabapentin 300 MG capsule    NEURONTIN    168 capsule    TAKE 2 CAPSULES (600MG) BY MOUTH THREE TIMES A DAY    Type 2 diabetes mellitus with diabetic neuropathy, with long-term current use of insulin (H)       glucose 4 g Chew chewable tablet     20 tablet    Take 2 every 15 minutes for blood sugar <70mg/dL. Recheck blood sugar every 15 minutes until above 70mg/dL, then eat a substantial meal.    Type 2 diabetes mellitus with mild nonproliferative retinopathy without macular edema, with long-term current use of insulin, unspecified laterality (H)       ibuprofen 600 MG tablet    ADVIL/MOTRIN    60 tablet    Take 1 tablet (600 mg) by mouth every 4 hours as needed for moderate pain    Closed fracture of one rib of right side, initial encounter       insulin aspart 100 UNIT/ML injection    NovoLOG FLEXPEN    15 mL    Inject 20 Units Subcutaneous 3 times daily (with meals) Once daily, can add  additional 5 units if BGs are >500mg/dL.    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, with long-term current use of insulin, unspecified laterality (H)       insulin pen needle 30G X 8 MM    NOVOFINE 30    400 each    USE 4 DAILY OR AS DIRECTED    Type 2 diabetes mellitus with mild nonproliferative retinopathy without macular edema, with long-term current use of insulin, unspecified laterality (H)       ipratropium - albuterol 0.5 mg/2.5 mg/3 mL 0.5-2.5 (3) MG/3ML neb solution    DUONEB    30 vial    Take 1 vial (3 mLs) by nebulization every 6 hours as needed for shortness of breath / dyspnea or wheezing    Wheezing       losartan 100 MG tablet    COZAAR    28 tablet    TAKE 1 TABLET (100MG) BY MOUTH DAILY    Coronary artery disease involving native heart with angina pectoris, unspecified vessel or lesion type (H)       melatonin 5 MG Caps     180 capsule    Take 2 capsules by mouth daily At dinnertime    Insomnia, unspecified type       metoprolol succinate 25 MG 24 hr tablet    TOPROL-XL    30 tablet    Take 1 tablet (25 mg) by mouth daily    Coronary artery disease involving native heart with angina pectoris, unspecified vessel or lesion type (H)       * order for DME     1 each    Hold Novolog if meals are refused.    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, with long-term current use of insulin, unspecified laterality (H)       order for DME     1 each     Equipment being ordered: 4 Wheeled walker with seat and brakes.    Generalized muscle weakness       order for DME     1 Units    Equipment being ordered: Nebulizer Machine    Wheezing, Cough, Pneumonia due to infectious organism, unspecified laterality, unspecified part of lung       order for DME     2 each    Equipment being ordered: Compression socks.    Bilateral leg edema       paliperidone 9 MG 24 hr tablet    INVEGA    30 tablet    Take 1 tablet (9 mg) by mouth every morning    Schizoaffective disorder, bipolar type (H)       polyethylene glycol powder    MIRALAX/GLYCOLAX    527 g    TAKE 8.5 GRAMS (1/2 CAPFUL) BY MOUTH DAILY    Constipation, unspecified constipation type       prochlorperazine 5 MG tablet    COMPAZINE    90 tablet    Take 1 tablet (5 mg) by mouth every 6 hours as needed for nausea or vomiting    Nausea       ranitidine 300 MG tablet    ZANTAC    90 tablet    TAKE 1 TABLET (300MG) BY MOUTH AT BEDTIME    Gastroesophageal reflux disease without esophagitis       senna-docusate 8.6-50 MG per tablet    SENOKOT-S;PERICOLACE    100 tablet    Take 1 tablet by mouth 2 times daily as needed for constipation    Drug-induced constipation       sertraline 100 MG tablet    ZOLOFT    60 tablet    Take 2 tablets (200 mg) by mouth daily    Schizoaffective disorder, bipolar type (H)       SUMAtriptan 25 MG tablet    IMITREX    12 tablet    Take 1-2 tablets (25-50 mg) by mouth at onset of headache for migraine May repeat in 2 hours. Max 8 tablets/24 hours.    Migraine with aura and without status migrainosus, not intractable       topiramate 25 MG tablet    TOPAMAX    120 tablet    Take 2 tablets (50 mg) by mouth 2 times daily    Morbid obesity (H)       vitamin D3 61052 UNITS capsule    CHOLECALCIFEROL    12 capsule    TAKE 1 CAPSULE (50,000 UNITS) MONTHLY    Vitamin D deficiency       * Notice:  This list has 2 medication(s) that are the same as other medications prescribed for you. Read the directions  carefully, and ask your doctor or other care provider to review them with you.

## 2018-10-05 NOTE — PATIENT INSTRUCTIONS
-Will call you with results of urine.   -Use compression socks during the day time.   -Call clinic with any questions or concerns.

## 2018-10-05 NOTE — PROGRESS NOTES
SUBJECTIVE:                                                    Nena Tang is a 58 year old  female who presents to clinic today for the following health issues:  [facility]: Don    Patient with a wheeled walker today. Reports that her [employer] worker will help get a bigger walker.     Diabetes Follow-up  Patient reports that she forgot to bring her blood sugar list today- will plan to have this faxed to the clinic.   Patient is checking blood sugars: three daily with meals.   Blood sugar testing frequency justification: Uncontrolled diabetes  Results are as follows:  A few in the 300's in the evening. Morning blood sugars less than 200; ; Reports a couple of 80's at bedtime after her meal replacement novolog.     Diabetic concerns: None     Symptoms of hypoglycemia (low blood sugar): none     Paresthesias (numbness or burning in feet) or sores: No     Date of last diabetic eye exam: Up to date     Leg Edema: Patient reports some lower leg swelling. Reports that this happens occasionally and will typically be more noticeable at night. Patient currently has no swelling.     BP Readings from Last 2 Encounters:   09/10/18 135/72   08/22/18 110/58     Hemoglobin A1C (%)   Date Value   08/06/2018 6.4 (H)   05/22/2018 6.2 (H)     LDL Cholesterol Calculated (mg/dL)   Date Value   08/06/2018 84   06/27/2017 64       Diabetes Management Resources      Mental Health Follow up   Feels that her mood has been good.Reports that her sister has been doing good- has bad and good days. Patient's sister was diagnosed with lung cancer.Denies any suicidal thoughts. Reports that she has been worrying a little bit, but not too much to disrupt her daily life.     Status since last visit: improving.     See PHQ-9 for current symptoms.  Other associated symptoms: None    Complicating factors: none today.   Significant life event:  No   Current substance abuse:  None  Anxiety or Panic symptoms:  No    Sleep - trouble falling  "asleep and when awake at night, unable to fall back to sleep. Does not use her mask at night because she feels like she might get suffocated by the \"man\" that she sees if she has no night light on.   Patient commits today to use her CPAP at least 2 times per week.   Appetite - very good.   Exercise - using the walk[facility]     Smoking - denies.   Alcohol - denies.   Street drugs - denies.   Marijuana - denies.     PHQ-9  English PHQ-9   Any Language               Problems taking medications regularly: No    Medication side effects: none    Diet: regular (no restrictions) and working on eating healthier.     Social History   Substance Use Topics     Smoking status: Never Smoker     Smokeless tobacco: Never Used     Alcohol use No      Comment: last month        Problem list and histories reviewed & adjusted, as indicated.  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 (H)     Illiterate     Irritable bowel syndrome     overweight - BMI >35     Takotsubo cardiomyopathy     CAD (coronary artery disease)     Restless legs syndrome (RLS)     CINDY (obstructive sleep apnea)- mild AHI 10.3     Verbal auditory hallucination     Chronic low back pain     Schizoaffective disorder, depressive type (H)     Migraine headache     HTN, goal below 140/90     Health Care Home     Lumbago     Cervicalgia     Cocaine abuse, episodic (H)     Suicidal ideation     Esophageal reflux     Mild nonproliferative diabetic retinopathy (H)     Tardive dyskinesia     Alcohol use     Left cataract     Falls frequently     History of uterine cancer     Psychophysiological insomnia     Dysuria     Asymptomatic postmenopausal status     Abdominal pain, right lower quadrant     Sepsis (H)     Pneumonia of " right lower lobe due to infectious organism (H)     Infectious encephalopathy     Non-intractable vomiting with nausea     Acute respiratory failure with hypoxia (H)     Thoughts of self harm     Gastroenteritis     Posttraumatic stress disorder     Cervical cancer screening     Type 2 diabetes mellitus with stage 3 chronic kidney disease, with long-term current use of insulin (H)     Past Surgical History:   Procedure Laterality Date     C OOPHORECTORMY FOR RAHEL, W/BX  1983    UTERINE     CATARACT IOL, RT/LT Bilateral 2017     COLONOSCOPY N/A 3/16/2017    Procedure: COLONOSCOPY;  Surgeon: Traci Gonzalez MD;  Location:  GI     Coronary CTA  5/21/2014     HYSTERECTOMY  1983    uterine cancer yearly pap's per provider.     LAPAROSCOPIC CHOLECYSTECTOMY  2008     PHACOEMULSIFICATION CLEAR CORNEA WITH STANDARD INTRAOCULAR LENS IMPLANT Left 5/5/2017    Procedure: PHACOEMULSIFICATION CLEAR CORNEA WITH STANDARD INTRAOCULAR LENS IMPLANT;  LEFT EYE PHACOEMULSIFICATION CLEAR CORNEA WITH STANDARD INTRAOCULAR LENS IMPLANT ;  Surgeon: Tyra Diaz MD;  Location:  [facility]     PHACOEMULSIFICATION CLEAR CORNEA WITH STANDARD INTRAOCULAR LENS IMPLANT Right 6/30/2017    Procedure: PHACOEMULSIFICATION CLEAR CORNEA WITH STANDARD INTRAOCULAR LENS IMPLANT;  RIGHT EYE PHACOEMULSIFICATION CLEAR CORNEA WITH STANDARD INTRAOCULAR LENS IMPLANT;  Surgeon: Tyra Diaz MD;  Location:  [facility]     RELEASE TRIGGER FINGER  10/11/2012    Left thumb. Procedure: RELEASE TRIGGER FINGER;  LEFT THUMB TRIGGER RELEASE;  Surgeon: Tay Langley MD;  Location:  [facility]     RELEASE TRIGGER FINGER Right 12/26/2016    Procedure: RELEASE TRIGGER FINGER;  Surgeon: Albino Castañeda MD;  Location:  OR       Social History   Substance Use Topics     Smoking status: Never Smoker     Smokeless tobacco: Never Used     Alcohol use No      Comment: last month     Family History   Problem Relation Age of Onset     Cancer Mother      BLADDER      Respiratory Mother      COPD     GASTROINTESTINAL DISEASE Mother      CIRRHOSIS OF LI BOLIVAR     Alcohol/Drug Mother      Diabetes Mother      Hypertension Mother      Lipids Mother      C.A.D. Mother      Glaucoma Mother      Alcohol/Drug Sister      Mental Illness Sister      Alcohol/Drug Sister      Psychotic Disorder Sister      Cancer Maternal Grandmother      UNKNOWN TYPE     Cancer Brother      COLON     Cancer - colorectal Brother      IN HIS LATE 30S     Alcohol/Drug Brother       OF HEROIN OVERDOSE AT AGE 22 YRS     Macular Degeneration No family hx of            Current Outpatient Prescriptions   Medication Sig Dispense Refill     acetaminophen (TYLENOL) 500 MG tablet Take 2 tablets (1,000 mg) by mouth 3 times daily as needed for mild pain 100 tablet 3     albuterol (PROAIR HFA/PROVENTIL HFA/VENTOLIN HFA) 108 (90 Base) MCG/ACT Inhaler Inhale 2 puffs into the lungs every 6 hours as needed for shortness of breath / dyspnea or wheezing 1 Inhaler 0     aspirin 81 MG EC tablet TAKE 1 TABLET (81MG) BY MOUTH DAILY 28 tablet 3     atorvastatin (LIPITOR) 80 MG tablet TAKE 1 TABLET (80MG) BY MOUTH DAILY 30 tablet 11     benztropine (COGENTIN) 0.5 MG tablet Take 1 tablet (0.5 mg) by mouth 2 times daily 60 tablet 2     blood glucose monitoring (ONE TOUCH DELICA) lancets Use to test blood sugar 2 times daily or as directed.  Ok to substitute alternative if insurance prefers. 150 each 11     blood glucose monitoring (ONETOUCH VERIO IQ) test strip Use to test blood sugar 4 times daily .  Ok to substitute alternative if insurance prefers. 200 strip 11     calcium carbonate (OS-ALLEN 600 MG [place]. [place]) 1500 (600 [place]) MG tablet Take 1 tablet (1,500 mg) by mouth daily 180 tablet 3     clotrimazole (LOTRIMIN) 1 % cream Apply topically 2 times daily       DiphenhydrAMINE HCl (DIPHENDRYL PO) Take 25 mg by mouth every 6 hours as needed for itching       dulaglutide (TRULICITY) 1.5 MG/0.5ML pen Inject 1.5 mg Subcutaneous every 7  days 2 mL 3     fluticasone (FLONASE) 50 MCG/ACT spray Spray 1-2 sprays into both nostrils daily 1 Bottle 11     gabapentin (NEURONTIN) 300 MG capsule TAKE 2 CAPSULES (600MG) BY MOUTH THREE TIMES A  capsule 1     glucose 4 G CHEW chewable tablet Take 2 every 15 minutes for blood sugar <70mg/dL. Recheck blood sugar every 15 minutes until above 70mg/dL, then eat a substantial meal. 20 tablet 1     guaiFENesin (MUCINEX) 600 MG 12 hr tablet Take 1 tablet (600 mg) by mouth 2 times daily as needed for congestion 20 tablet 0     guaiFENesin-codeine (ROBITUSSIN AC) 100-10 MG/5ML SOLN solution Take 5 mLs by mouth every 6 hours as needed for cough 120 mL 0     ibuprofen (ADVIL,MOTRIN) 600 MG tablet Take 1 tablet (600 mg) by mouth every 4 hours as needed for moderate pain 60 tablet 0     insulin aspart (NOVOLOG FLEXPEN) 100 UNIT/ML injection Inject 20 Units Subcutaneous 3 times daily (with meals) Once daily, can add additional 5 units if BGs are >500mg/dL. 15 mL 11     insulin glargine (LANTUS SOLOSTAR) 100 UNIT/ML pen Inject 48 Units Subcutaneous At Bedtime 3 mL 11     insulin pen needle (NOVOFINE 30) 30G X 8 MM USE 4 DAILY OR AS DIRECTED 400 each 0     ipratropium - albuterol 0.5 mg/2.5 mg/3 mL (DUONEB) 0.5-2.5 (3) MG/3ML neb solution Take 1 vial (3 mLs) by nebulization every 6 hours as needed for shortness of breath / dyspnea or wheezing 30 vial 0     losartan (COZAAR) 100 MG tablet TAKE 1 TABLET (100MG) BY MOUTH DAILY 28 tablet 3     melatonin 5 MG CAPS Take 2 capsules by mouth daily At dinnertime 180 capsule 2     metoprolol succinate (TOPROL-XL) 25 MG 24 hr tablet Take 1 tablet (25 mg) by mouth daily 30 tablet 1     order for DME Equipment being ordered: Nebulizer Machine 1 Units 0     order for DME Equipment being ordered: 4 Wheeled walker with seat and brakes. 1 each 0     order for DME Diabetic Shoes 2 each 0     order for DME Hold Novolog if meals are refused. 1 each 0     paliperidone (INVEGA) 9 MG 24 hr  tablet Take 1 tablet (9 mg) by mouth every morning 30 tablet 2     polyethylene glycol (MIRALAX/GLYCOLAX) powder TAKE 8.5 GRAMS (1/2 CAPFUL) BY MOUTH DAILY 527 g 3     prochlorperazine (COMPAZINE) 5 MG tablet Take 1 tablet (5 mg) by mouth every 6 hours as needed for nausea or vomiting 90 tablet 0     ranitidine (ZANTAC) 300 MG tablet TAKE 1 TABLET (300MG) BY MOUTH AT BEDTIME 90 tablet 1     senna-docusate (SENOKOT-S;PERICOLACE) 8.6-50 MG per tablet Take 1 tablet by mouth 2 times daily as needed for constipation 100 tablet 1     sertraline (ZOLOFT) 100 MG tablet Take 2 tablets (200 mg) by mouth daily 60 tablet 2     SUMAtriptan (IMITREX) 25 MG tablet Take 1-2 tablets (25-50 mg) by mouth at onset of headache for migraine May repeat in 2 hours. Max 8 tablets/24 hours. 12 tablet 1     topiramate (TOPAMAX) 25 MG tablet Take 2 tablets (50 mg) by mouth 2 times daily 120 tablet 3     vitamin D3 (CHOLECALCIFEROL) 99240 UNITS capsule TAKE 1 CAPSULE (50,000 UNITS) MONTHLY 12 capsule 1     Allergies   Allergen Reactions     Imidazole Antifungals Hives     Tolerates diflucan     Ketoprofen Itching     Pruritis to topical     Lisinopril Hives     Metformin Other (See Comments)     Patient hospitalized for lactic acidosis - admitting provider suspectd caused by metformin     Metronidazole Hives     Posaconazole Hives     Tolerates diflucan     Recent Labs   Lab Test  08/06/18   1038  06/29/18   1333  05/28/18   1625  05/22/18   1434  02/12/18   1408  02/08/18   2155   01/13/18   2315   11/07/17   0801   06/27/17   1358   12/29/16   0909   07/13/16   1350   07/14/15   1215   A1C  6.4*   --    --   6.2*  6.8*   --    --    --    < >  8.9*   < >  7.0*   < >   --    < >   --    < >  9.1*   LDL  84   --    --    --    --    --    --    --    --    --    --   64   --    --    --   137*   --   78   HDL  46*   --    --    --    --    --    --    --    --    --    --   37*   --    --    --    --    --   39*   TRIG  215*   --    --    --     --    --    --    --    --    --    --   267*   --    --    --    --    --   151*   ALT   --    --   27   --    --   23   --   29   --   23   < >  32   < >  26   < >   --    < >   --    CR   --   1.06*  1.16*   --    --   1.09*   < >  1.62*   < >  0.88   < >  0.78   < >  0.72   < >   --    < >  0.67   GFRESTIMATED   --   53*  48*   --    --   52*   < >  33*   < >  66   < >  76   < >  83   < >   --    < >  >90  Non  GFR Calc     GFRESTBLACK   --   65  58*   --    --   63   < >  40*   < >  80   < >  >90   GFR Calc     < >  >90   GFR Calc     < >   --    < >  >90   GFR Calc     POTASSIUM   --   4.7  4.5   --    --   4.3   < >  4.0   < >  3.9   < >  4.3   < >  4.2   < >   --    < >  4.3   TSH   --    --    --    --    --    --    --    --    --   3.21   --    --    --   2.24   < >   --    < >   --     < > = values in this interval not displayed.      BP Readings from Last 3 Encounters:   09/10/18 135/72   08/22/18 110/58   08/06/18 122/64    Wt Readings from Last 3 Encounters:   09/10/18 237 lb 8 oz (107.7 kg)   09/06/18 240 lb (108.9 kg)   08/22/18 239 lb (108.4 kg)        Labs reviewed in EPIC  Problem list, Medication list, Allergies, and Medical/Social/Surgical histories reviewed in [facility] and updated as appropriate.     ROS: Constitutional, neuro, ENT, endocrine, pulmonary, cardiac, gastrointestinal, genitourinary, musculoskeletal, integument and psychiatric systems are negative, except as otherwise noted above in the HPI.   OBJECTIVE:                                                    /78  Pulse 79  Temp 97.3  F (36.3  C) (Temporal)  Resp 16  Wt 238 lb (108 kg)  LMP 01/06/2015  SpO2 92%  BMI 45.52 kg/m2  Body mass index is 45.52 kg/(m^2).  GENERAL: alert, no distress  EYES: Eyes grossly normal to inspection, extraocular movements - intact.  RESP: no shortness of breath.   MS: normal gait with wheeled walker.     Mental Status  Assessment:  Appearance:   Appropriate   Eye Contact:   Good   Psychomotor Behavior: Normal   Attitude:   Cooperative   Orientation:   All  Speech   Rate / Production: Normal    Volume:  Normal   Mood:    Normal  Affect:    Appropriate   Thought Content:  Clear   Thought Form:  Coherent  Logical   Insight:    Good   Attention Span and Concentration:  appropriate.   Recent and Remote Memory:  intact  Fund of Knowledge: appropriate  Muscle Strength and Tone: normal   Suicidal Ideation: reports no thoughts, no intention  Hallucination: No  Paranoid-No  Manic-No  Panic-No  Self harm-No      See [facility] notes     Diagnostic Test Results:  Results for orders placed or performed in visit on 10/05/18   UA with Microscopic reflex to Culture   Result Value Ref Range    Color Urine Yellow     Appearance Urine Clear     Glucose Urine Negative NEG^Negative mg/dL    Bilirubin Urine Negative NEG^Negative    Ketones Urine Negative NEG^Negative mg/dL    Specific Gravity Urine 1.020 1.003 - 1.035    pH Urine 7.0 5.0 - 7.0 pH    Protein Albumin Urine Negative NEG^Negative mg/dL    Urobilinogen Urine 1.0 0.2 - 1.0 EU/dL    Nitrite Urine Negative NEG^Negative    Blood Urine Negative NEG^Negative    Leukocyte Esterase Urine Trace (A) NEG^Negative    Source Urine     WBC Urine 0 - 5 OTO5^0 - 5 /HPF    RBC Urine O - 2 OTO2^O - 2 /HPF    Squamous Epithelial /LPF Urine Few FEW^Few /LPF    Bacteria Urine Few (A) NEG^Negative /HPF     *Note: Due to a large number of results and/or encounters for the requested time period, some results have not been displayed. A complete set of results can be found in Results Review.        ASSESSMENT/PLAN:                                                    (Z23) Need for prophylactic vaccination and inoculation against influenza  (primary encounter diagnosis)  Comment: Patient reports that she got her vaccination at her group home.     (E11.22,  N18.3,  Z79.4) Type 2 diabetes mellitus with stage 3 chronic kidney  disease, with long-term current use of insulin (H)  Comment: Noted above.   Plan: Continue with current insulin dosing with no changes.     (R60.0) Bilateral leg edema  Comment: reports some edema at the end of the day.   Plan: order for DME        -Will trial compressions and see if there is any improvements.     (R35.0) Urinary frequency  Comment: Reports some increased urinary frequency, denies any pain, itchiness, or abdominal pain.   Plan: UA with Microscopic reflex to Culture        -Will call patient with the results.     (E66.01) overweight - BMI >35  Comment: Body mass index is 45.52 kg/(m^2).  Wt Readings from Last 4 Encounters:   10/05/18 238 lb (108 kg)   09/10/18 237 lb 8 oz (107.7 kg)   09/06/18 240 lb (108.9 kg)   08/22/18 239 lb (108.4 kg)     Plan: Continue with healthy eating and increased activity level as tolerated. Encouraged patient to continue using her walker for better stability.       Patient Instructions   -Will call you with results of urine.   -Use compression socks during the day time.   -Call clinic with any questions or concerns.     I spent Greater than 40 minutes was spent face to face with Nena Tang, with greater than 50 % in counselling and consultation about as noted above.       ART F[facility] PRIMARY CARE

## 2018-10-05 NOTE — PROGRESS NOTES
[facility] - Integrated Primary Care   October 5, 2018      Behavioral Health Clinician Progress Note    Patient Name: Nena Tang           Service Type:  Individual      Service Location:   Face to Face in Clinic     Session Start Time: 9am  Session End Time: 9:27am      Session Length: 16 - 37      Attendees: Patient and PCP    Visit Activities (Refresh list every visit): [facility] [employer]    Diagnostic Assessment Date: 1-23-18  Treatment Plan Review Date: Not completed yet  See Flowsheets for today's PHQ-9 and TAMIA-7 results  Previous PHQ-9:   PHQ-9 SCORE 1/2/2017 2/3/2017 3/13/2018   Total Score - - -   Total Score - - -   Total Score 0 0 14     Previous TAMIA-7:   TAMIA-7 SCORE 1/2/2017 2/3/2017 3/13/2018   Total Score - - -   Total Score 0 0 17   Total Score - - -       ALEXANDRA LEVEL:  ALEXANDRA Score (Last Two) 8/30/2011 6/9/2014   ALEXANDRA Raw Score 42 37   Activation Score 66 49.9   ALEXANDRA Level 3 2       DATA  Extended Session (60+ minutes): No  Interactive Complexity: No  Crisis: No  [facility] Patient: No    Treatment Objective(s) Addressed in This Session:  Target Behavior(s): disease management/lifestyle changes mental health    Depressed Mood: Increase interest, engagement, and pleasure in doing things  Decrease frequency and intensity of feeling down, depressed, hopeless  Improve quantity and quality of night time sleep / decrease daytime naps  Feel less tired and more energy during the day   Identify negative self-talk and behaviors: challenge core beliefs, myths, and actions  Improve concentration, focus, and mindfulness in daily activities   Decrease thoughts that you'd be better off dead or of suicide / self-harm  Anxiety: will experience a reduction in anxiety, will develop more effective coping skills to manage anxiety symptoms, will develop healthy cognitive patterns and beliefs and will increase ability to function adaptively  Alcohol / Substance Use: continue to make healthy choices regarding substance use and  engage in activities / supportive services that promote sobriety  Thought Disturbance: will develop skills to more effectively manage symptoms, will develop more effective support systems and will continue to take medications as prescribed    Current Stressors / Issues:    [facility] met with patient at PCP request-she has been checking her blood sugars 3 times a day and she reported having some readings in the 300s-these high numbers are after she eats at fast food restaurant-she stated that over all she is doing good-her son recently purchased a walker chair for the patient and she has been using this to mobile around-she is planing to get a new larger walker chair with the financial support of her [employer] worker-she had discussion of her management of diabetes with the PCP during the visit-regarding sleeping she reported not being able to sleep good-hard to fall asleep-waking during night and hard to go back to sleep-this sleep issue is due fears and she has not been using her CPAP-she chooses not to wear the mask-she agreed to wear the mask 2 times a week-regarding mood she reported feeling pretty good-she reported that her sister with health issues is doing good-no suicidal thoughts-regarding anxiety she reported to worry a little-she has been able manage her attention to hallucination-       Progress on Treatment Objective(s) / Homework:  Minimal progress - ACTION (Actively working towards change); Intervened by reinforcing change plan / affirming steps taken    Motivational Interviewing    MI Intervention: Expressed Empathy/Understanding, Supported Autonomy, Collaboration, Evocation, Permission to raise concern or advise, Open-ended questions, Reflections: simple and complex, Rolled with resistance: Emphasized patient autonomy, Simple reflection and Evoked patient agenda and Change talk (evoked)     Change Talk Expressed by the Patient: Desire to change Ability to change Reasons to change Need to change Committment to  change Activation Taking steps    Provider Response to Change Talk: E - Evoked more info from patient about behavior change, A - Affirmed patient's thoughts, decisions, or attempts at behavior change, R - Reflected patient's change talk and S - Summarized patient's change talk statements      Care Plan review completed: No    Medication Review:  No changes to current psychiatric medication(s)   Medication Compliance:  NA    Changes in Health Issues:   None reported     Chemical Use Review:   Substance Use: Chemical use reviewed, no active concerns identified      Tobacco Use: No current tobacco use.      Assessment: Current Emotional / Mental Status (status of significant symptoms):  Risk status (Self / Other harm or suicidal ideation)  Patient has had a history of suicidal ideation: yes  Patient denies current fears or concerns for personal safety.  Patient denies current or recent suicidal ideation or behaviors.  Patient denies current or recent homicidal ideation or behaviors.  Patient denies current or recent self injurious behavior or ideation.  Patient denies other safety concerns.  A safety and risk management plan has not been developed at this time, however patient was encouraged to call [place] [phone] should there be a change in any of these risk factors.    Appearance:   Appropriate   Eye Contact:   Good   Psychomotor Behavior: Normal   Attitude:   Cooperative   Orientation:   All  Speech   Rate / Production: Normal    Volume:  Normal   Mood:    Normal  Affect:    Appropriate  Bright  Worrisome   Thought Content:  Clear   Thought Form:  Coherent   Insight:    Good     Diagnoses:  1. Schizoaffective disorder, depressive type (H)    2. Posttraumatic stress disorder        Collateral Reports Completed:  Not Applicable    Plan: (Homework, other):  Patient was given information about behavioral services and encouraged to schedule a follow up appointment with the clinic [facility] as needed.  She was also given  information about mental health symptoms and treatment options .  CD Recommendations: Maintain Sobriety.    BIN George, [facility]      ______________________________________________________________________

## 2018-10-06 NOTE — TELEPHONE ENCOUNTER
Caregiver (Alva) calling from pt's group home. Alva spoke w/ FNA earlier tonight about low BG which was 74. Pt refused ED at that time. Current  (9pm). Alva asks if they need to hold the Lantus pt gets at bedtime. Advised Alva it is not necessary to hold Lantus because Lantus is a long acting insulin which will not take effect until tomorrow AM. Pt should eat breakfast in AM according to her ordered diet. Caregiver voiced understanding and agreement.  Camille Harrell RN/FNA    Additional Information    Negative: Unconscious or difficult to awaken    Negative: Seizure occurs    Negative: Acting confused (e.g., disoriented, slurred speech)    Negative: Very weak (e.g., can't stand)    Negative: Sounds like a life-threatening emergency to the triager    Negative: [1] Vomiting AND [2] signs of dehydration (e.g., no urine > 12 hours, very dry mouth, dark urine, etc.)    Negative: [1] Low blood sugar symptoms persist > 15 minutes AND [2] using low blood sugar Care Advice    Negative: [1] Low blood glucose (< 70 mg/dl  or 3.9 mmol/l) persists > 15 minutes AND [2] using low blood sugar Care Advice    Negative: Patient sounds very sick or weak to the triager    Negative: [1] Low blood sugar symptoms with no other adult present AND [2] hasn't tried Care Advice    Negative: [1] Low blood glucose (< 70 mg/dl  or 3.9 mmol/l) with no other adult present AND [2] hasn't tried Care Advice    Negative: Diabetes drug error or overdose (e.g., insulin error or extra dose)    Negative: Caller has URGENT medication or insulin pump question and triager unable to answer question    Negative: [1] Blood glucose < 70  mg/dl (3.9 mmol/l) or symptomatic with other adult present AND [2] cause unknown food, strenuous exercise.)    Negative: [1] Morning (before breakfast) blood glucose < 80 mg/dl (4.5 mmol/L) AND [2] more than once in past week    Negative: [1] Evening (after bedtime snack) blood glucose < 100 mg/dl (5.6 mmol/l) AND [2] more  than once in past week    Negative: Caller has NON-URGENT medication question about med that PCP prescribed and triager unable to answer question    Negative: [1] Blood glucose < 70 mg/dl (3.9 mmol/l) or symptomatic AND [2] cause known (all triage questions negative)    Low blood sugar definition and treatment, questions about    Protocols used: DIABETES - LOW BLOOD SUGAR-ADULT-AH

## 2018-10-06 NOTE — TELEPHONE ENCOUNTER
"Patient's care giver Alva calling on patient's behalf reporting patient had low blood sugar of 74. Requesting advise on if patient should take her Lantus medication tonight. RN called patient and conference patient to do triage. RN requested patient recheck blood sugar and it is 100. Patient states she ate \"a piece of cake\". Patient reports feeling \"a little dizzy\". Per guideline advised patient to be seen at the emergency department. Patient refused disposition and states \"I am not going to the hospital\".     RN paged Nurse Practitioner Wendy Tang For integrated care at 9:00pm through answering services and Nurse Practitioner's phone goes to [employer].     Called patient back and informed unable to reach a provider and the recommendation is to go to the emergency department.     Abdulaziz Villalobos RN  [facility]       Reason for Disposition    Patient sounds very sick or weak to the triager    Additional Information    Negative: Unconscious or difficult to awaken    Negative: Seizure occurs    Negative: Acting confused (e.g., disoriented, slurred speech)    Negative: Very weak (e.g., can't stand)    Negative: Sounds like a life-threatening emergency to the triager    Negative: [1] Vomiting AND [2] signs of dehydration (e.g., no urine > 12 hours, very dry mouth, dark urine, etc.)    Negative: [1] Low blood sugar symptoms persist > 15 minutes AND [2] using low blood sugar Care Advice    Negative: [1] Low blood glucose (< 70 mg/dl  or 3.9 mmol/l) persists > 15 minutes AND [2] using low blood sugar Care Advice    Protocols used: DIABETES - LOW BLOOD SUGAR-ADULT-      "

## 2018-10-17 ENCOUNTER — TRANSFERRED RECORDS (OUTPATIENT)
Dept: HEALTH INFORMATION MANAGEMENT | Facility: CLINIC | Age: 58
End: 2018-10-17

## 2018-10-17 ENCOUNTER — HOSPITAL ENCOUNTER (INPATIENT)
Facility: CLINIC | Age: 58
LOS: 4 days | Discharge: NURSING, REHAB OR LONG-TERM CARE | DRG: 871 | End: 2018-10-21
Attending: EMERGENCY MEDICINE | Admitting: INTERNAL MEDICINE
Payer: MEDICARE

## 2018-10-17 ENCOUNTER — APPOINTMENT (OUTPATIENT)
Dept: GENERAL RADIOLOGY | Facility: CLINIC | Age: 58
DRG: 871 | End: 2018-10-17
Attending: EMERGENCY MEDICINE
Payer: MEDICARE

## 2018-10-17 DIAGNOSIS — R06.02 SHORTNESS OF BREATH: ICD-10-CM

## 2018-10-17 DIAGNOSIS — A41.9 SEPSIS, DUE TO UNSPECIFIED ORGANISM: ICD-10-CM

## 2018-10-17 DIAGNOSIS — R05.8 PRODUCTIVE COUGH: ICD-10-CM

## 2018-10-17 LAB
ALBUMIN SERPL-MCNC: 3.7 G/DL (ref 3.4–5)
ALBUMIN UR-MCNC: NEGATIVE MG/DL
ALP SERPL-CCNC: 95 U/L (ref 40–150)
ALT SERPL W P-5'-P-CCNC: 23 U/L (ref 0–50)
ANION GAP SERPL CALCULATED.3IONS-SCNC: 5 MMOL/L (ref 3–14)
APPEARANCE UR: CLEAR
AST SERPL W P-5'-P-CCNC: 17 U/L (ref 0–45)
BASOPHILS # BLD AUTO: 0 10E9/L (ref 0–0.2)
BASOPHILS NFR BLD AUTO: 0.3 %
BILIRUB SERPL-MCNC: 0.2 MG/DL (ref 0.2–1.3)
BILIRUB UR QL STRIP: NEGATIVE
BUN SERPL-MCNC: 15 MG/DL (ref 7–30)
CALCIUM SERPL-MCNC: 8.9 MG/DL (ref 8.5–10.1)
CHLORIDE SERPL-SCNC: 106 MMOL/L (ref 94–109)
CO2 BLDCOV-SCNC: 22 MMOL/L (ref 21–28)
CO2 SERPL-SCNC: 26 MMOL/L (ref 20–32)
COLOR UR AUTO: YELLOW
CREAT SERPL-MCNC: 1.04 MG/DL (ref 0.52–1.04)
DIFFERENTIAL METHOD BLD: ABNORMAL
EOSINOPHIL # BLD AUTO: 0.1 10E9/L (ref 0–0.7)
EOSINOPHIL NFR BLD AUTO: 0.7 %
ERYTHROCYTE [DISTWIDTH] IN BLOOD BY AUTOMATED COUNT: 13.3 % (ref 10–15)
FLUAV+FLUBV AG SPEC QL: NEGATIVE
FLUAV+FLUBV AG SPEC QL: NEGATIVE
GFR SERPL CREATININE-BSD FRML MDRD: 54 ML/MIN/1.7M2
GLUCOSE BLDC GLUCOMTR-MCNC: 128 MG/DL (ref 70–99)
GLUCOSE BLDC GLUCOMTR-MCNC: 172 MG/DL (ref 70–99)
GLUCOSE SERPL-MCNC: 173 MG/DL (ref 70–99)
GLUCOSE UR STRIP-MCNC: NEGATIVE MG/DL
HCT VFR BLD AUTO: 33.2 % (ref 35–47)
HGB BLD-MCNC: 10.5 G/DL (ref 11.7–15.7)
HGB UR QL STRIP: NEGATIVE
IMM GRANULOCYTES # BLD: 0.1 10E9/L (ref 0–0.4)
IMM GRANULOCYTES NFR BLD: 0.5 %
KETONES UR STRIP-MCNC: NEGATIVE MG/DL
LACTATE BLD-SCNC: 1.4 MMOL/L (ref 0.7–2)
LACTATE BLD-SCNC: 2 MMOL/L (ref 0.7–2.1)
LACTATE BLD-SCNC: 2.1 MMOL/L (ref 0.7–2)
LEUKOCYTE ESTERASE UR QL STRIP: NEGATIVE
LYMPHOCYTES # BLD AUTO: 1.1 10E9/L (ref 0.8–5.3)
LYMPHOCYTES NFR BLD AUTO: 8.7 %
MCH RBC QN AUTO: 31 PG (ref 26.5–33)
MCHC RBC AUTO-ENTMCNC: 31.6 G/DL (ref 31.5–36.5)
MCV RBC AUTO: 98 FL (ref 78–100)
MONOCYTES # BLD AUTO: 0.8 10E9/L (ref 0–1.3)
MONOCYTES NFR BLD AUTO: 6.6 %
MUCOUS THREADS #/AREA URNS LPF: PRESENT /LPF
NEUTROPHILS # BLD AUTO: 10 10E9/L (ref 1.6–8.3)
NEUTROPHILS NFR BLD AUTO: 83.2 %
NITRATE UR QL: NEGATIVE
NRBC # BLD AUTO: 0 10*3/UL
NRBC BLD AUTO-RTO: 0 /100
PCO2 BLDV: 45 MM HG (ref 40–50)
PH BLDV: 7.31 PH (ref 7.32–7.43)
PH UR STRIP: 6 PH (ref 5–7)
PLATELET # BLD AUTO: 212 10E9/L (ref 150–450)
PO2 BLDV: 23 MM HG (ref 25–47)
POTASSIUM SERPL-SCNC: 4.3 MMOL/L (ref 3.4–5.3)
PROT SERPL-MCNC: 8 G/DL (ref 6.8–8.8)
RBC # BLD AUTO: 3.39 10E12/L (ref 3.8–5.2)
RBC #/AREA URNS AUTO: 0 /HPF (ref 0–2)
SAO2 % BLDV FROM PO2: 33 %
SODIUM SERPL-SCNC: 137 MMOL/L (ref 133–144)
SOURCE: ABNORMAL
SP GR UR STRIP: 1.02 (ref 1–1.03)
SPECIMEN SOURCE: NORMAL
SQUAMOUS #/AREA URNS AUTO: 1 /HPF (ref 0–1)
UROBILINOGEN UR STRIP-MCNC: 0 MG/DL (ref 0–2)
WBC # BLD AUTO: 12.1 10E9/L (ref 4–11)
WBC #/AREA URNS AUTO: <1 /HPF (ref 0–5)

## 2018-10-17 PROCEDURE — 96365 THER/PROPH/DIAG IV INF INIT: CPT

## 2018-10-17 PROCEDURE — 81001 URINALYSIS AUTO W/SCOPE: CPT | Performed by: EMERGENCY MEDICINE

## 2018-10-17 PROCEDURE — 87804 INFLUENZA ASSAY W/OPTIC: CPT | Performed by: EMERGENCY MEDICINE

## 2018-10-17 PROCEDURE — 84145 PROCALCITONIN (PCT): CPT | Performed by: EMERGENCY MEDICINE

## 2018-10-17 PROCEDURE — 25000128 H RX IP 250 OP 636: Performed by: EMERGENCY MEDICINE

## 2018-10-17 PROCEDURE — 36415 COLL VENOUS BLD VENIPUNCTURE: CPT

## 2018-10-17 PROCEDURE — 25000132 ZZH RX MED GY IP 250 OP 250 PS 637: Mod: GY | Performed by: EMERGENCY MEDICINE

## 2018-10-17 PROCEDURE — 87040 BLOOD CULTURE FOR BACTERIA: CPT | Performed by: EMERGENCY MEDICINE

## 2018-10-17 PROCEDURE — 00000146 ZZHCL STATISTIC GLUCOSE BY METER IP

## 2018-10-17 PROCEDURE — 96367 TX/PROPH/DG ADDL SEQ IV INF: CPT

## 2018-10-17 PROCEDURE — 25000131 ZZH RX MED GY IP 250 OP 636 PS 637: Mod: GY | Performed by: INTERNAL MEDICINE

## 2018-10-17 PROCEDURE — 82803 BLOOD GASES ANY COMBINATION: CPT

## 2018-10-17 PROCEDURE — 99285 EMERGENCY DEPT VISIT HI MDM: CPT | Mod: 25

## 2018-10-17 PROCEDURE — 80053 COMPREHEN METABOLIC PANEL: CPT | Performed by: EMERGENCY MEDICINE

## 2018-10-17 PROCEDURE — 83605 ASSAY OF LACTIC ACID: CPT

## 2018-10-17 PROCEDURE — 36415 COLL VENOUS BLD VENIPUNCTURE: CPT | Performed by: EMERGENCY MEDICINE

## 2018-10-17 PROCEDURE — 71046 X-RAY EXAM CHEST 2 VIEWS: CPT

## 2018-10-17 PROCEDURE — 12000000 ZZH R&B MED SURG/OB

## 2018-10-17 PROCEDURE — 25000132 ZZH RX MED GY IP 250 OP 250 PS 637: Mod: GY | Performed by: INTERNAL MEDICINE

## 2018-10-17 PROCEDURE — 83605 ASSAY OF LACTIC ACID: CPT | Performed by: EMERGENCY MEDICINE

## 2018-10-17 PROCEDURE — 99223 1ST HOSP IP/OBS HIGH 75: CPT | Mod: AI | Performed by: INTERNAL MEDICINE

## 2018-10-17 PROCEDURE — 96361 HYDRATE IV INFUSION ADD-ON: CPT

## 2018-10-17 PROCEDURE — 87086 URINE CULTURE/COLONY COUNT: CPT | Performed by: EMERGENCY MEDICINE

## 2018-10-17 PROCEDURE — A9270 NON-COVERED ITEM OR SERVICE: HCPCS | Mod: GY | Performed by: INTERNAL MEDICINE

## 2018-10-17 PROCEDURE — 85025 COMPLETE CBC W/AUTO DIFF WBC: CPT | Performed by: EMERGENCY MEDICINE

## 2018-10-17 PROCEDURE — A9270 NON-COVERED ITEM OR SERVICE: HCPCS | Mod: GY | Performed by: EMERGENCY MEDICINE

## 2018-10-17 PROCEDURE — 25000128 H RX IP 250 OP 636: Performed by: INTERNAL MEDICINE

## 2018-10-17 RX ORDER — CEFTRIAXONE 2 G/1
2 INJECTION, POWDER, FOR SOLUTION INTRAMUSCULAR; INTRAVENOUS EVERY 24 HOURS
Status: DISCONTINUED | OUTPATIENT
Start: 2018-10-18 | End: 2018-10-21 | Stop reason: HOSPADM

## 2018-10-17 RX ORDER — ACETAMINOPHEN 500 MG
500 TABLET ORAL ONCE
Status: COMPLETED | OUTPATIENT
Start: 2018-10-17 | End: 2018-10-17

## 2018-10-17 RX ORDER — GABAPENTIN 300 MG/1
600 CAPSULE ORAL 3 TIMES DAILY
Status: DISCONTINUED | OUTPATIENT
Start: 2018-10-17 | End: 2018-10-21 | Stop reason: HOSPADM

## 2018-10-17 RX ORDER — POTASSIUM CHLORIDE 29.8 MG/ML
20 INJECTION INTRAVENOUS
Status: DISCONTINUED | OUTPATIENT
Start: 2018-10-17 | End: 2018-10-21 | Stop reason: HOSPADM

## 2018-10-17 RX ORDER — TOPIRAMATE 25 MG/1
50 TABLET, FILM COATED ORAL 2 TIMES DAILY
Status: DISCONTINUED | OUTPATIENT
Start: 2018-10-17 | End: 2018-10-21 | Stop reason: HOSPADM

## 2018-10-17 RX ORDER — CEFTRIAXONE 2 G/1
2 INJECTION, POWDER, FOR SOLUTION INTRAMUSCULAR; INTRAVENOUS ONCE
Status: COMPLETED | OUTPATIENT
Start: 2018-10-17 | End: 2018-10-17

## 2018-10-17 RX ORDER — NICOTINE POLACRILEX 4 MG
15-30 LOZENGE BUCCAL
Status: DISCONTINUED | OUTPATIENT
Start: 2018-10-17 | End: 2018-10-21 | Stop reason: HOSPADM

## 2018-10-17 RX ORDER — BENZTROPINE MESYLATE 0.5 MG/1
0.5 TABLET ORAL 2 TIMES DAILY
Status: DISCONTINUED | OUTPATIENT
Start: 2018-10-17 | End: 2018-10-21 | Stop reason: HOSPADM

## 2018-10-17 RX ORDER — DEXTROSE MONOHYDRATE 25 G/50ML
25-50 INJECTION, SOLUTION INTRAVENOUS
Status: DISCONTINUED | OUTPATIENT
Start: 2018-10-17 | End: 2018-10-21 | Stop reason: HOSPADM

## 2018-10-17 RX ORDER — ACETAMINOPHEN 325 MG/1
650 TABLET ORAL EVERY 4 HOURS PRN
Status: DISCONTINUED | OUTPATIENT
Start: 2018-10-17 | End: 2018-10-21 | Stop reason: HOSPADM

## 2018-10-17 RX ORDER — LOSARTAN POTASSIUM 100 MG/1
100 TABLET ORAL DAILY
Status: DISCONTINUED | OUTPATIENT
Start: 2018-10-18 | End: 2018-10-21 | Stop reason: HOSPADM

## 2018-10-17 RX ORDER — POTASSIUM CHLORIDE 1500 MG/1
20-40 TABLET, EXTENDED RELEASE ORAL
Status: DISCONTINUED | OUTPATIENT
Start: 2018-10-17 | End: 2018-10-21 | Stop reason: HOSPADM

## 2018-10-17 RX ORDER — SODIUM CHLORIDE 9 MG/ML
INJECTION, SOLUTION INTRAVENOUS CONTINUOUS
Status: DISCONTINUED | OUTPATIENT
Start: 2018-10-17 | End: 2018-10-17

## 2018-10-17 RX ORDER — SERTRALINE HYDROCHLORIDE 100 MG/1
200 TABLET, FILM COATED ORAL DAILY
Status: DISCONTINUED | OUTPATIENT
Start: 2018-10-18 | End: 2018-10-21 | Stop reason: HOSPADM

## 2018-10-17 RX ORDER — POTASSIUM CHLORIDE 7.45 MG/ML
10 INJECTION INTRAVENOUS
Status: DISCONTINUED | OUTPATIENT
Start: 2018-10-17 | End: 2018-10-21 | Stop reason: HOSPADM

## 2018-10-17 RX ORDER — SODIUM CHLORIDE 9 MG/ML
INJECTION, SOLUTION INTRAVENOUS CONTINUOUS
Status: DISCONTINUED | OUTPATIENT
Start: 2018-10-17 | End: 2018-10-19

## 2018-10-17 RX ORDER — ONDANSETRON 2 MG/ML
4 INJECTION INTRAMUSCULAR; INTRAVENOUS EVERY 6 HOURS PRN
Status: DISCONTINUED | OUTPATIENT
Start: 2018-10-17 | End: 2018-10-21 | Stop reason: HOSPADM

## 2018-10-17 RX ORDER — ONDANSETRON 4 MG/1
4 TABLET, ORALLY DISINTEGRATING ORAL EVERY 6 HOURS PRN
Status: DISCONTINUED | OUTPATIENT
Start: 2018-10-17 | End: 2018-10-21 | Stop reason: HOSPADM

## 2018-10-17 RX ORDER — LIDOCAINE 40 MG/G
CREAM TOPICAL
Status: DISCONTINUED | OUTPATIENT
Start: 2018-10-17 | End: 2018-10-21 | Stop reason: HOSPADM

## 2018-10-17 RX ORDER — ASPIRIN 81 MG/1
81 TABLET ORAL DAILY
Status: DISCONTINUED | OUTPATIENT
Start: 2018-10-18 | End: 2018-10-21 | Stop reason: HOSPADM

## 2018-10-17 RX ORDER — POTASSIUM CHLORIDE 1.5 G/1.58G
20-40 POWDER, FOR SOLUTION ORAL
Status: DISCONTINUED | OUTPATIENT
Start: 2018-10-17 | End: 2018-10-21 | Stop reason: HOSPADM

## 2018-10-17 RX ORDER — MAGNESIUM SULFATE HEPTAHYDRATE 40 MG/ML
4 INJECTION, SOLUTION INTRAVENOUS EVERY 4 HOURS PRN
Status: DISCONTINUED | OUTPATIENT
Start: 2018-10-17 | End: 2018-10-21 | Stop reason: HOSPADM

## 2018-10-17 RX ORDER — METOPROLOL SUCCINATE 25 MG/1
25 TABLET, EXTENDED RELEASE ORAL DAILY
Status: DISCONTINUED | OUTPATIENT
Start: 2018-10-18 | End: 2018-10-21 | Stop reason: HOSPADM

## 2018-10-17 RX ORDER — POTASSIUM CL/LIDO/0.9 % NACL 10MEQ/0.1L
10 INTRAVENOUS SOLUTION, PIGGYBACK (ML) INTRAVENOUS
Status: DISCONTINUED | OUTPATIENT
Start: 2018-10-17 | End: 2018-10-21 | Stop reason: HOSPADM

## 2018-10-17 RX ORDER — ALBUTEROL SULFATE 0.83 MG/ML
2.5 SOLUTION RESPIRATORY (INHALATION) EVERY 4 HOURS PRN
Status: DISCONTINUED | OUTPATIENT
Start: 2018-10-17 | End: 2018-10-21 | Stop reason: HOSPADM

## 2018-10-17 RX ORDER — BENZONATATE 100 MG/1
100 CAPSULE ORAL 3 TIMES DAILY PRN
Status: DISCONTINUED | OUTPATIENT
Start: 2018-10-17 | End: 2018-10-21 | Stop reason: HOSPADM

## 2018-10-17 RX ORDER — NALOXONE HYDROCHLORIDE 0.4 MG/ML
.1-.4 INJECTION, SOLUTION INTRAMUSCULAR; INTRAVENOUS; SUBCUTANEOUS
Status: DISCONTINUED | OUTPATIENT
Start: 2018-10-17 | End: 2018-10-21 | Stop reason: HOSPADM

## 2018-10-17 RX ADMIN — INSULIN GLARGINE 48 UNITS: 100 INJECTION, SOLUTION SUBCUTANEOUS at 22:58

## 2018-10-17 RX ADMIN — ACETAMINOPHEN 500 MG: 500 TABLET, FILM COATED ORAL at 16:03

## 2018-10-17 RX ADMIN — ACETAMINOPHEN 650 MG: 325 TABLET, FILM COATED ORAL at 22:21

## 2018-10-17 RX ADMIN — SODIUM CHLORIDE: 9 INJECTION, SOLUTION INTRAVENOUS at 22:18

## 2018-10-17 RX ADMIN — SODIUM CHLORIDE 497 ML: 9 INJECTION, SOLUTION INTRAVENOUS at 17:35

## 2018-10-17 RX ADMIN — GABAPENTIN 600 MG: 300 CAPSULE ORAL at 22:18

## 2018-10-17 RX ADMIN — AZITHROMYCIN MONOHYDRATE 500 MG: 500 INJECTION, POWDER, LYOPHILIZED, FOR SOLUTION INTRAVENOUS at 19:11

## 2018-10-17 RX ADMIN — SODIUM CHLORIDE 1000 ML: 9 INJECTION, SOLUTION INTRAVENOUS at 15:33

## 2018-10-17 RX ADMIN — TOPIRAMATE 50 MG: 25 TABLET, FILM COATED ORAL at 22:18

## 2018-10-17 RX ADMIN — BENZTROPINE MESYLATE 0.5 MG: 0.5 TABLET ORAL at 22:58

## 2018-10-17 RX ADMIN — CEFTRIAXONE SODIUM 2 G: 2 INJECTION, POWDER, FOR SOLUTION INTRAMUSCULAR; INTRAVENOUS at 18:32

## 2018-10-17 ASSESSMENT — ENCOUNTER SYMPTOMS
MYALGIAS: 1
FEVER: 1
ABDOMINAL PAIN: 1
BLOOD IN STOOL: 1
ARTHRALGIAS: 1
DIAPHORESIS: 1
DYSURIA: 1
FREQUENCY: 1
SHORTNESS OF BREATH: 1

## 2018-10-17 NOTE — IP AVS SNAPSHOT
` [place] [address] MEDICAL SURGICAL: [phone]                                              INTERAGENCY TRANSFER FORM - NURSING   10/17/2018                    Hospital Admission Date: 10/17/2018  ERIK BURNHAM   : 1960  Sex: Female        Attending Provider: Manuelito Smith MD     Allergies:  Imidazole Antifungals, Ketoprofen, Lisinopril, Metformin, Metronidazole, Posaconazole    Infection:  None   Service:  GENERAL MEDI    Ht:  1.524 m (5')   Wt:  108.9 kg (240 lb)   Admission Wt:  99.8 kg (220 lb)    BMI:  46.87 kg/m 2   BSA:  2.15 m 2            Patient PCP Information     Provider PCP Type    ART Fay CNP General      Current Code Status     Date Active Code Status Order ID Comments User Context       Prior      Code Status History     Date Active Date Inactive Code Status Order ID Comments User Context    10/21/2018  9:52 AM  Full Code [id number]  Manuelito Smith MD Outpatient    10/17/2018  9:33 PM 10/21/2018  9:52 AM Full Code [id number]  Manuelito Smith MD Inpatient    2018  1:35 PM 10/17/2018  9:33 PM Full Code [id number]  Cristian Avalos MD Outpatient    2018  7:48 AM 2018  1:35 PM Full Code [id number]  Jacki Allen RN Inpatient    2017  1:37 AM 2017  6:34 PM Full Code [id number]  Karthikeyan Miles MD Inpatient    2017 10:57 PM 11/10/2017  8:16 PM Full Code [id number]  Fadi Pearl MD Inpatient    2017  9:30 AM 2017 10:57 PM Full Code [id number]  Manfred Frost MD Outpatient    2017  6:12 PM 2017  9:30 AM Full Code [id number]  Belgica Hall MD Inpatient    2016  6:02 PM 2016  4:20 PM Full Code [id number]  Estella Smith RN Inpatient    2016  9:48 AM 2016  6:02 PM Full Code [id number]  Albino Castañeda MD Outpatient    10/24/2016 10:48 PM 10/27/2016  5:39 PM Full Code [id number]  Rosa Soto RN Inpatient    2016  3:02 PM  10/24/2016 10:48 PM Full Code [id number]  Chava Barrios MD Outpatient    5/3/2016  3:27 AM 5/4/2016  3:02 PM Full Code [id number]  Roverto Fitzgerald MD Inpatient    2/15/2016  1:54 AM 2/22/2016  5:18 PM Full Code [id number]  Codie Coates MD Inpatient    6/17/2015  9:02 PM 6/29/2015  4:02 PM Full Code [id number]  Manjinder Escamilla RN Inpatient    3/7/2015  7:55 PM 3/16/2015  2:54 PM Full Code [id number]  Ada Villela RN Inpatient    1/6/2015  7:17 AM 1/7/2015  6:05 PM Full Code [id number]  Michael Brown MD Inpatient    12/13/2014  2:18 AM 12/23/2014  2:06 PM Full Code [id number]  Jean Claude Meza MD Inpatient    5/29/2014 10:00 AM 12/13/2014  2:18 AM Full Code [id number]  DELROY Gomez MD Outpatient    5/29/2014  1:15 AM 5/29/2014 10:00 AM Full Code [id number]  Cha Denny DO Inpatient    5/1/2014  9:00 PM 5/8/2014  1:53 PM Full Code [id number]  Karthikeyan Hooper MD Inpatient    10/2/2013  3:34 PM 10/5/2013  4:59 PM Full Code [id number]  Kae Stacy RN Inpatient    10/30/2012 11:46 AM 10/2/2013  3:34 PM Full Code [id number]  Bibi Agrawal MD Outpatient    10/28/2012 10:15 PM 10/30/2012 11:46 AM Full Code [id number]  Mag Agrawal MD Inpatient      Advance Directives        Scanned docmt in ACP Activity?           No scanned doc        Hospital Problems as of 10/21/2018              Priority Class Noted POA    Sepsis (H) Medium  8/29/2017 Yes      Non-Hospital Problems as of 10/21/2018              Priority Class Noted    Osteopenia Low  10/7/2009    Vitamin B12 deficiency without anemia Low  11/23/2009    Hyperlipidemia LDL goal <100 Medium  10/31/2010    Rotator cuff syndrome Medium  7/6/2011    Type 2 diabetes mellitus with mild nonproliferative retinopathy (H) Medium  8/30/2011    Illiterate Medium  8/30/2011    Irritable bowel syndrome Low  Unknown    overweight - BMI >35 Low  Unknown    Takotsubo cardiomyopathy Low  Unknown    CAD (coronary artery disease) High   2/29/2012    Restless legs syndrome (RLS) Low  2/29/2012    CINDY (obstructive sleep apnea)- mild AHI 10.3 Medium  3/8/2012    Verbal auditory hallucination Medium  10/4/2012    Chronic low back pain Medium  1/22/2013    Schizoaffective disorder, depressive type (H) Medium  2/25/2013    Migraine headache Medium  4/22/2013    HTN, goal below 140/90 Medium  7/29/2013    Health Care Home Medium  8/16/2013    Lumbago Medium  9/20/2013    Cervicalgia Medium  9/20/2013    Cocaine abuse, episodic (H) Medium  10/3/2013    Suicidal ideation Medium  5/1/2014    Esophageal reflux Medium  6/9/2014    Mild nonproliferative diabetic retinopathy (H) Medium  6/19/2014    Tardive dyskinesia Medium  9/11/2015    Alcohol use Medium  4/19/2016    Left cataract Medium  7/25/2016    Falls frequently Medium  8/18/2016    History of uterine cancer Medium  12/7/2016    Psychophysiological insomnia Medium  3/9/2017    Dysuria Medium  6/12/2017    Asymptomatic postmenopausal status Medium  6/28/2017    Abdominal pain, right lower quadrant Medium  7/13/2017    Pneumonia of right lower lobe due to infectious organism (H) Medium  9/4/2017    Infectious encephalopathy Medium  9/4/2017    Non-intractable vomiting with nausea Medium  9/4/2017    Acute respiratory failure with hypoxia (H) Medium  9/4/2017    Thoughts of self harm Medium  10/23/2017    Gastroenteritis Medium  12/8/2017    Posttraumatic stress disorder Medium  1/29/2018    Cervical cancer screening Medium  6/1/2018    Type 2 diabetes mellitus with stage 3 chronic kidney disease, with long-term current use of insulin (H) Medium  6/5/2018      Immunizations     Name Date      Influenza (IIV3) PF 09/24/12     Influenza (IIV3) PF 10/17/11     Influenza (IIV3) PF 10/06/11     Influenza (IIV3) PF 02/10/11     Influenza (IIV3) PF 01/22/09     Influenza Vaccine IM 3yrs+ 4 Valent IIV4 11/06/17     Influenza Vaccine IM 3yrs+ 4 Valent IIV4 09/16/16     Influenza Vaccine IM 3yrs+ 4 Valent IIV4  02/16/16     Influenza Vaccine IM 3yrs+ 4 Valent IIV4 10/03/13     Mantoux Tuberculin Skin Test 07/10/14     Pneumococcal 23 valent 01/22/09     TDAP Vaccine (Adacel) 01/22/09          END      ASSESSMENT     Discharge Profile Flowsheet     EXPECTED DISCHARGE     FINAL RESOURCES      Expected Discharge Date  10/19/18 (D/C expected 10/19>>from Grp Home) 10/18/18 1331   Resources List  Home Care;Other (Comments) (mental health , day treatment, mental health OT) 03/06/17 0910    DISCHARGE NEEDS ASSESSMENT     Other Resources  County Worker 10/18/18 0954    Equipment Currently Used at Home  cane, quad 10/18/18 0954   [facility] Worker Name  pt unsure  10/18/18 0954    # of Referrals Placed by CTS  Communication hand-offs to next level of Care Providers 12/09/17 1838   [facility] Worker Status  Active 10/18/18 0954    Primary Care Clinic Name  [facility]  10/18/18 0954   SKIN      Primary Care MD Name  anoop  10/18/18 0954   Inspection of bony prominences  Full 10/21/18 0826    GASTROINTESTINAL (ADULT,PEDIATRIC,OB)     Inspection under devices  Full 10/18/18 1617    GI WDL  ex 10/21/18 0826   Skin WDL  ex 10/21/18 0826    Abdominal Appearance  obese 10/21/18 0826   Skin Temperature  warm 10/21/18 0826    All Quadrants Bowel Sounds  hypoactive 10/20/18 2321   Skin Moisture  dry 10/21/18 0826    Last Bowel Movement  10/19/18 10/21/18 0826   Skin Elasticity  quick return to original state 10/21/18 0826    GI Signs/Symptoms  abdominal discomfort;constipation 10/21/18 0826   Skin Integrity  blister(s) (right buttock) 10/21/18 0826    Passing flatus  yes 10/21/18 0826   SAFETY      COMMUNICATION ASSESSMENT     Safety WDL  WDL 10/21/18 0826    Patient's communication style  spoken language (English or Bilingual) 10/17/18 1510   All Alarms  alarm(s) activated and audible 10/21/18 0826                 Assessment WDL (Within Defined Limits) Definitions           Safety WDL     Effective: 09/28/15    Row Information: <b>WDL  "Definition:</b> Bed in low position, wheels locked; call light in reach; upper side rails up x 2; ID band on<br> <font color=\"gray\"><i>Item=AS safety wdl>>List=AS safety wdl>>Version=F14</i></font>      Skin WDL     Effective: 09/28/15    Row Information: <b>WDL Definition:</b> Warm; dry; intact; elastic; without discoloration; pressure points without redness<br> <font color=\"gray\"><i>Item=AS skin wdl>>List=AS skin wdl>>Version=F14</i></font>      Vitals     Vital Signs Flowsheet     VITAL SIGNS     EZEKIEL COMA SCALE      Temp  97.1  F (36.2  C) 10/21/18 0723   Best Eye Response  4-->(E4) spontaneous 10/17/18 1518    Temp src  Oral 10/21/18 0723   Best Motor Response  6-->(M6) obeys commands 10/17/18 1518    Resp  16 10/21/18 0723   Best Verbal Response  5-->(V5) oriented 10/17/18 1518    Pulse  74 10/21/18 0723   Ezekiel Coma Scale Score  15 10/17/18 1518    Heart Rate  78 10/21/18 0835   HEIGHT AND WEIGHT      Pulse/Heart Rate Source  Monitor 10/21/18 0835   Height  1.524 m (5') 10/17/18 2153    BP  159/75 10/21/18 0835   Height Method  Stated 10/17/18 2153    BP Location  Left arm 10/21/18 0835   Height Method  Stated 10/17/18 1518    OXYGEN THERAPY     Weight  108.9 kg (240 lb) 10/17/18 2153    SpO2  96 % 10/21/18 0907   Weight Method  Bed scale 10/17/18 2153    O2 Device  None (Room air) 10/21/18 0907   BSA (Calculated - sq m)  2.15 10/17/18 2153    Oxygen Delivery  -- 10/19/18 0746   BMI (Calculated)  46.97 10/17/18 2153    PAIN/COMFORT     POSITIONING      Patient Currently in Pain  denies 10/21/18 0724   Body Position  independently positioning 10/21/18 0852    Preferred Pain Scale  number (Numeric Rating Pain Scale) 10/19/18 2013   Head of Bed (HOB)  HOB at 20-30 degrees 10/21/18 0826    Patient's Stated Pain Goal  No pain 10/20/18 1503   Positioning/Transfer Devices  pillows;in use 10/21/18 0504    0-10 Pain Scale  4 10/19/18 2013   Chair  Upright in chair 10/21/18 0852    Pain Location  Abdomen 10/19/18 " 2013   DAILY CARE      Pain Descriptors  Cramping 10/19/18 2013   Activity Management  ambulated to bathroom 10/21/18 1106    Pain Intervention(s)  Medication (See eMAR) 10/19/18 2013   Activity Assistance Provided  assistance, stand-by 10/21/18 1106    Response to Interventions  Decrease in pain 10/19/18 2051   Assistive Device Utilized  walker 10/21/18 1106    ANALGESIA SIDE EFFECTS MONITORING     Additional Documentation  Activity Device Assistance (Row) 10/19/18 0030    Side Effects Monitoring: Respiratory Quality  R 10/20/18 0757   POINT OF CARE TESTING      Side Effects Monitoring: Respiratory Depth  N 10/20/18 0757   Puncture Site  fingertip 10/21/18 0837    Side Effects Monitoring: Sedation Level  1 10/20/18 0757   Bedside Glucose (mg/dl )   118 mg/dl 10/21/18 0837            Patient Lines/Drains/Airways Status    Active LINES/DRAINS/AIRWAYS     Name: Placement date: Placement time: Site: Days: Last dressing change:    Peripheral IV 02/08/18 Left Upper arm 02/08/18   2157   Upper arm   254             Patient Lines/Drains/Airways Status    Active PICC/CVC     None            Intake/Output Detail Report     Date Intake     Output Net    Shift P.O. I.V. IV Piggyback Total Urine Total       Noc 10/19/18 2300 - 10/20/18 0659 -- -- -- -- -- -- 0    Day 10/20/18 0700 - 10/20/18 1459 240 -- -- 240 -- -- 240    Radha 10/20/18 1500 - 10/20/18 2259 400 -- -- 400 -- -- 400    Noc 10/20/18 2300 - 10/21/18 0659 -- -- -- -- -- -- 0    Day 10/21/18 0700 - 10/21/18 1459 800 -- -- 800 -- -- 800      Last Void/BM       Most Recent Value    Urine Occurrence 1 at 10/21/2018 1105    Stool Occurrence 1 at 10/20/2018 0804      Case Management/Discharge Planning     Case Management/Discharge Planning Flowsheet     REFERRAL INFORMATION     Marital Status   10/18/18 0954    Did the Initial Social Work Assessment result in a Social Work Case?  Yes 10/18/18 0954   Who is your support system?  Children;Facility  resident(s)/Staff 10/18/18 0954    Admission Type  inpatient 10/18/18 0954   Description of Support System  Supportive;Involved 10/18/18 0954    Arrived From  home or self-care 10/18/18 0954   Support Assessment  Adequate family and caregiver support;Adequate social supports 10/18/18 0954    Referral Source  case finding 10/18/18 0954   COPING/STRESS      # of Referrals Placed by CTS  Communication hand-offs to next level of Care Providers 12/09/17 1838   Major Change/Loss/Stressor  hospitalization 10/21/18 1305    Reason For Consult  discharge planning 10/18/18 0954   EXPECTED DISCHARGE      Record Reviewed  history and physical;medical record 10/18/18 0954   Expected Discharge Date  10/19/18 (D/C expected 10/19>>from Grp Home) 10/18/18 1331    CTS Assigned to Case  Corinne  10/18/18 0956   FINAL RESOURCES      [facility] Name  [facility]  10/18/18 0954   Equipment Currently Used at Home  cane, quad 10/18/18 0954    Primary Care MD Name  davalos  10/18/18 0954   Resources List  Home Care;Other (Comments) (mental health , day treatment, mental health OT) 03/06/17 0910    LIVING ENVIRONMENT     Other Resources  County Worker 10/18/18 0954    Lives With  facility resident 10/18/18 0954   [facility] Worker Name  pt unsure  10/18/18 0954    Living Arrangements  group home 10/18/18 0954   [facility] Worker Status  Active 10/18/18 0954    Provides Primary Care For  no one 10/18/18 0954   ABUSE RISK SCREEN      Quality Of Family Relationships  supportive 10/18/18 0954   QUESTION TO PATIENT:  Has a member of your family or a partner(now or in the past) intimidated, hurt, manipulated, or controlled you in any way?  no 10/17/18 1513    Able to Return to Prior Living Arrangements  yes 10/18/18 0954   QUESTION TO PATIENT: Do you feel safe going back to the place where you are living?  yes 10/17/18 1513    HOME SAFETY     OBSERVATION: Is there reason to believe there has been maltreatment of a vulnerable adult (ie.  Physical/Sexual/Emotional abuse, self neglect, lack of adequate food, shelter, medical care, or financial exploitation)?  no 10/17/18 1513    Patient Feels Safe Living in Home?  yes 10/18/18 0954   HOMICIDE RISK      ASSESSMENT OF FAMILY/SOCIAL SUPPORT     Feels Like Hurting Others  no 10/17/18 1511

## 2018-10-17 NOTE — IP AVS SNAPSHOT
` `     [place] [address] [facility]: [phone]            Medication Administration Report for Nena Tang as of 10/21/18 1321   Legend:    Given Hold Not Given Due Canceled Entry Other Actions    Time Time (Time) Time  Time-Action       Inactive    Active    Linked        Medications 10/15/18 10/16/18 10/17/18 10/18/18 10/19/18 10/20/18 10/21/18    acetaminophen (TYLENOL) tablet 650 mg  Dose: 650 mg  Freq: EVERY 4 HOURS PRN Route: PO  PRN Reason: mild pain  Start: 10/17/18 2132   Admin Instructions: Alternate ibuprofen (if ordered) with acetaminophen.  Maximum acetaminophen dose from all sources = 75 mg/kg/day not to exceed 4 grams/day.    Admin. Amount: 2 tablet (2 × 325 mg tablet)  Last Admin: 10/19/18 1956  Dispense Loc: [facility]       2221 (650 mg)-Given        1045 (650 mg)-Given        1956 (650 mg)-Given             albuterol neb solution 2.5 mg  Dose: 2.5 mg  Freq: EVERY 4 HOURS PRN Route: NEBULIZATION  PRN Reason: wheezing  Start: 10/17/18 2132   Admin. Amount: 2.5 mg = 3 mL Conc: 2.5 mg/3 mL  Last Admin: 10/21/18 0904  Dispense Loc: [facility]  Volume: 3 mL        2254 (2.5 mg)-Given         1920 (2.5 mg)-Given        0904 (2.5 mg)-Given           aspirin EC tablet 81 mg  Dose: 81 mg  Freq: DAILY Route: PO  Start: 10/18/18 0900   Admin Instructions: DO NOT CRUSH.    Admin. Amount: 1 tablet (1 × 81 mg tablet)  Last Admin: 10/21/18 0840  Dispense Loc: [facility]        1001 (81 mg)-Given        0806 (81 mg)-Given        0809 (81 mg)-Given        0840 (81 mg)-Given           atorvastatin (LIPITOR) tablet 80 mg  Dose: 80 mg  Freq: EVERY EVENING Route: PO  Start: 10/19/18 2000   Admin. Amount: 2 tablet (2 × 40 mg tablet)  Last Admin: 10/20/18 2113  Dispense Loc: [facility]         1909 (80 mg)-Given        2113 (80 mg)-Given        [ ] 2000           azithromycin (ZITHROMAX) 500 mg in sodium chloride 0.9 % 250 mL intermittent infusion  Dose: 500 mg  Freq: EVERY 24 HOURS Route:  IV  Indications of Use: COMMUNITY ACQUIRED PNEUMONIA  Last Dose: 500 mg (10/18/18 1914)  Start: 10/18/18 1900   Admin. Amount: 500 mg  Last Admin: 10/19/18 1909  Dispense Loc:  Main Pharmacy  Infused Over: 1 Hours  Volume: 250 mL   Mixture Administration Information:   Medication Type Amount   azithromycin 500 MG SOLR Medications 500 mg   sodium chloride 0.9 % SOLN Base 250 mL                1914 (500 mg)-New Bag        1909 (500 mg)-New Bag        (2218)-Not Given [C]        [ ] 1900           benzonatate (TESSALON) capsule 100 mg  Dose: 100 mg  Freq: 3 TIMES DAILY PRN Route: PO  PRN Reason: cough  Start: 10/17/18 2306   Admin. Amount: 1 capsule (1 × 100 mg capsule)  Last Admin: 10/20/18 0810  Dispense Loc:  [facility]        0316 (100 mg)-Given       1920 (100 mg)-Given        0547 (100 mg)-Given        0810 (100 mg)-Given            benztropine (COGENTIN) tablet 0.5 mg  Dose: 0.5 mg  Freq: 2 TIMES DAILY Route: PO  Start: 10/17/18 2145   Admin. Amount: 1 tablet (1 × 0.5 mg tablet)  Last Admin: 10/21/18 0840  Dispense Loc:  [facility]       2258 (0.5 mg)-Given        1001 (0.5 mg)-Given       2137 (0.5 mg)-Given        0807 (0.5 mg)-Given       2059 (0.5 mg)-Given        0810 (0.5 mg)-Given       2113 (0.5 mg)-Given        0840 (0.5 mg)-Given       [ ] 2100           cefTRIAXone (ROCEPHIN) 2 g vial to attach to  ml bag for ADULTS or NS 50 ml bag for PEDS  Dose: 2 g  Freq: EVERY 24 HOURS Route: IV  Indications of Use: COMMUNITY ACQUIRED PNEUMONIA  Last Dose: Stopped (10/20/18 1745)  Start: 10/18/18 1800   Admin. Amount: 2 g  Last Admin: 10/20/18 1738  Dispense Loc:  [facility]  Infused Over: 30 Minutes  Volume: 20 mL        1757 (2 g)-New Bag        1813 (2 g)-New Bag        1738 (2 g)-New Bag       1745-Stopped [C]        [ ] 1800           fluticasone (FLONASE) 50 MCG/ACT spray 1-2 spray  Dose: 1-2 spray  Freq: DAILY Route: BOTH NOSTRIL  Start: 10/19/18 0900   Admin. Amount: 1-2 spray  Last Admin: 10/21/18  0839  Dispense Loc:  Main Pharmacy         1057 (1 spray)-Given        0812 (1 spray)-Given        0839 (1 spray)-Given           gabapentin (NEURONTIN) capsule 600 mg  Dose: 600 mg  Freq: 3 TIMES DAILY Route: PO  Start: 10/17/18 2200   Admin. Amount: 2 capsule (2 × 300 mg capsule)  Last Admin: 10/21/18 0839  Dispense Loc:  [facility]       2218 (600 mg)-Given        1001 (600 mg)-Given       1558 (600 mg)-Given       2137 (600 mg)-Given        0806 (600 mg)-Given       1548 (600 mg)-Given       2100 (600 mg)-Given        0809 (600 mg)-Given       1531 (600 mg)-Given       2113 (600 mg)-Given        0839 (600 mg)-Given       [ ] 1600       [ ] 2200           glucose gel 15-30 g  Dose: 15-30 g  Freq: EVERY 15 MIN PRN Route: PO  PRN Reason: low blood sugar  Start: 10/17/18 2132   Admin Instructions: Give 15 g for BG 51 to 69 mg/dL IF patient is conscious and able to swallow. Give 30 g for BG less than or equal to 50 mg/dL IF patient is conscious and able to swallow. Do NOT give glucose gel via enteral tube.  IF patient has enteral tube: give apple juice 120 mL (4 oz or 15 g of CHO) via enteral tube for BG 51 to 69 mg/dL.  Give apple juice 240 mL (8 oz or 30 g of CHO) via enteral tube for BG less than or equal to 50 mg/dL.    ~Oral gel is preferable for conscious and able to swallow patient.   ~IF gel unavailable or patient refuses may provide apple juice 120 mL (4 oz or 15 g of CHO). Document juice on I and O flowsheet.    Admin. Amount: 15-30 g  Dispense Loc:  [facility]  Volume: 93.75 mL              Or  dextrose 50 % injection 25-50 mL  Dose: 25-50 mL  Freq: EVERY 15 MIN PRN Route: IV  PRN Reason: low blood sugar  Start: 10/17/18 2132   Admin Instructions: Use if have IV access, BG less than 70 mg/dL and meet dose criteria below:  Dose if conscious and alert (or disorientated) and NPO = 25 mL  Dose if unconscious / not alert = 50 mL  Vesicant. For ordered doses up to 25 g, give IV Push undiluted. Give each 5g  over 1 minute.    Admin. Amount: 25-50 mL  Dispense Loc: [facility]  Infused Over: 1-5 Minutes  Volume: 50 mL              Or  glucagon injection 1 mg  Dose: 1 mg  Freq: EVERY 15 MIN PRN Route: SC  PRN Reason: low blood sugar  PRN Comment: May repeat x 1 only  Start: 10/17/18 2132   Admin Instructions: May give SQ or IM. ONLY use glucagon IF patient has NO IV access AND is UNABLE to swallow AND blood glucose is LESS than or EQUAL to 50 mg/dL.  If ordered IV, give IV Push over 1 minute. Reconstitute with 1mL sterile water.    Admin. Amount: 1 mg  Dispense Loc: [facility]               guaiFENesin (ROBITUSSIN) 20 mg/mL solution 10 mL  Dose: 10 mL  Freq: EVERY 4 HOURS PRN Route: PO  PRN Reason: cough  Start: 10/19/18 0202   Admin. Amount: 10 mL  Last Admin: 10/21/18 0839  Dispense Loc: [facility]  Volume: 10 mL         0219 (10 mL)-Given       0814 (10 mL)-Given       1559 (10 mL)-Given        0109 (10 mL)-Given       1415 (10 mL)-Given        0839 (10 mL)-Given           insulin aspart (NovoLOG) inj (RAPID ACTING)  Dose: 5 Units  Freq: 3 TIMES DAILY WITH MEALS Route: SC  Start: 10/18/18 1200   Admin Instructions: If given at mealtime, administer within 30 minutes of start of meal    Admin. Amount: 5 Units  Last Admin: 10/21/18 0946  Dispense Loc: Contact Rx for dose  Volume: 3 mL        (1527)-Not Given [C]       1803 (5 Units)-Given        (0900)-Not Given [C]       1101 (5 Units)-Given       1813 (5 Units)-Given        (0902)-Not Given [C]       (1338)-Not Given [C]              (1854)-Not Given [C]        0946 (5 Units)-Given       [ ] 1200       [ ] 1800           insulin aspart (NovoLOG) inj (RAPID ACTING)  Dose: 1-7 Units  Freq: AT BEDTIME Route: SC  Start: 10/17/18 2200   Admin Instructions: HIGH INSULIN RESISTANCE DOSING    Do Not give Bedtime Correction Insulin if BG less than 200.   For  - 224 give 1 units.   For  - 249 give 2 units.   For  - 274 give 3 units.   For  - 299 give 4  units.   For  - 324 give 5 units.   For  - 349 give 6 units.   For BG greater than or equal to 350 give 7 units.   Notify provider if glucose greater than or equal to 350 mg/dL after administration of correction dose.  If given at mealtime, administer within 30 minutes of start of meal    Admin. Amount: 1-7 Units  Last Admin: 10/18/18 2139  Dispense Loc: Contact Rx for dose  Volume: 3 mL       (2212)-Not Given        2139 (2 Units)-Given        (2101)-Not Given        (2118)-Not Given        [ ] 2200           insulin aspart (NovoLOG) inj (RAPID ACTING)  Dose: 1-10 Units  Freq: 3 TIMES DAILY BEFORE MEALS Route: SC  Start: 10/18/18 0730   Admin Instructions: Correction Scale - HIGH INSULIN RESISTANCE DOSING     Do Not give Correction Insulin if Pre-Meal BG less than 140.   For Pre-Meal  - 164 give 1 unit.   For Pre-Meal  - 189 give 2 units.   For Pre-Meal  - 214 give 3 units.   For Pre-Meal  - 239 give 4 units.   For Pre-Meal  - 264 give 5 units.   For Pre-Meal  - 289 give 6 units.   For Pre-Meal  - 314 give 7 units.   For Pre-Meal  - 339 give 8 units.   For Pre-Meal  - 364 give 9 units.   For Pre-Meal BG greater than or equal to 365 give 10 units  To be given with prandial insulin, and based on pre-meal blood glucose.   Notify provider if glucose greater than or equal to 350 mg/dL after administration of correction dose.  If given at mealtime, administer within 30 minutes of start of meal    Admin. Amount: 1-10 Units  Dispense Loc: Contact Rx for dose  Volume: 3 mL        (1125)-Not Given [C]       (1528)-Not Given [C]       (1751)-Not Given        (0741)-Not Given [C]       (1100)-Not Given [C]       (1728)-Not Given [C]        (0902)-Not Given [C]       (1253)-Not Given [C]       (1738)-Not Given [C]        (0844)-Not Given [C]       [ ] 1200       [ ] 1800           insulin glargine (LANTUS) injection 30 Units  Dose: 30 Units  Freq: AT BEDTIME  "Route: SC  Start: 10/18/18 2200   Admin Instructions: *Not for IV use, SQ only.  Do not mix with other insulins*    Admin. Amount: 30 Units  Last Admin: 10/20/18 2118  Dispense Loc:  Main Pharmacy  Volume: 0.3 mL         (30 Units)-Given         (30 Units)-Given         (30 Units)-Given        [ ]            ipratropium - albuterol 0.5 mg/2.5 mg/3 mL (DUONEB) neb solution 3 mL  Dose: 1 vial  Freq: EVERY 6 HOURS PRN Route: NEBULIZATION  PRN Reasons: shortness of breath / dyspnea,wheezing  Start: 10/19/18 0840   Admin. Amount: 3 mL  Dispense Loc:  [facility]  Volume: 3 mL               lidocaine (LMX4) cream  Freq: ONCE PRN Route: Top  PRN Reason: mild pain  PRN Comment: with VAD insertion or accessing implanted port,  Start: 10/17/18 1519   Admin Instructions: Do NOT give if patient has a history of allergy to any local anesthetic or any \"deven\" product.   Apply 30 min prior to VAD insertion or port access.  MAX Dose:  2.5 gm (  of 5 gm tube)      Dispense Loc:  [facility]  Administrations Remainin               lidocaine 1 % 1 mL  Dose: 1 mL  Freq: ONCE PRN Route: OTHER  PRN Comment: mild pain with VAD insertion or accessing implanted port,  Start: 10/17/18 1519   Admin Instructions: Do NOT give if patient has a history of allergy to any local anesthetic or any \"deven\" product. MAX dose 1 mL subcutaneous OR intradermal in divided doses.    Admin. Amount: 1 mL  Dispense Loc: [facility] Floor Stock  Administrations Remainin  Volume: 2 mL               losartan (COZAAR) tablet 100 mg  Dose: 100 mg  Freq: DAILY Route: PO  Start: 10/18/18 0900   Admin Instructions: Hold if systolic blood pressure less than 120    Admin. Amount: 1 tablet (1 × 100 mg tablet)  Last Admin: 10/21/18 0840  Dispense Loc:  ADS [facility]        (1001)-Not Given        0806 (100 mg)-Given        0809 (100 mg)-Given        0840 (100 mg)-Given           magnesium sulfate 4 g in 100 mL sterile water (premade)  Dose: 4 g  Freq: EVERY 4 " HOURS PRN Route: IV  PRN Reason: magnesium supplementation  Start: 10/17/18 2132   Admin Instructions: For serum Mg++ less than 1.6 mg/dL  Give 4 g and recheck magnesium level 2 hours after dose, and next AM.    Admin. Amount: 4 g = 100 mL Conc: 4 g/100 mL  Dispense Loc:  Main Pharmacy  Infused Over: 120 Minutes  Volume: 100 mL               melatonin tablet 1 mg  Dose: 1 mg  Freq: AT BEDTIME PRN Route: PO  PRN Reason: sleep  Start: 10/17/18 2132   Admin Instructions: Do not give unless at least 6 hours of uninterrupted sleep is expected.    Admin. Amount: 1 tablet (1 × 1 mg tablet)  Dispense Loc:  [facility]               melatonin tablet 10 mg  Dose: 10 mg  Freq: DAILY Route: PO  Start: 10/19/18 0900   Admin. Amount: 2 tablet (2 × 5 mg tablet)  Last Admin: 10/20/18 1738  Dispense Loc:  [facility]         1909 (10 mg)-Given        1738 (10 mg)-Given        [ ] 1800           metoprolol succinate (TOPROL-XL) 24 hr tablet 25 mg  Dose: 25 mg  Freq: DAILY Route: PO  Start: 10/18/18 0900   Admin Instructions: DO NOT CRUSH. Tablet may be split in half along score line.  Hold if systolic blood pressure less than 100 or heart rate less than 55    Admin. Amount: 1 tablet (1 × 25 mg tablet)  Last Admin: 10/21/18 0840  Dispense Loc:  ADS [facility]        1057 (25 mg)-Given [C]        0807 (25 mg)-Given        0810 (25 mg)-Given        0840 (25 mg)-Given           naloxone (NARCAN) injection 0.1-0.4 mg  Dose: 0.1-0.4 mg  Freq: EVERY 2 MIN PRN Route: IV  PRN Reason: opioid reversal  Start: 10/17/18 2132   Admin Instructions: For respiratory rate LESS than or EQUAL to 8.  Partial reversal dose:  0.1 mg titrated q 2 minutes for Analgesia Side Effects Monitoring Sedation Level of 3 (frequently drowsy, arousable, drifts to sleep during conversation).Full reversal dose:  0.4 mg bolus for Analgesia Side Effects Monitoring Sedation Level of 4 (somnolent, minimal or no response to stimulation).  For ordered IV doses 0.1-2mg give IVP.  Give each 0.4mg over 15 seconds in emergency situations. For non-emergent situations further dilute in 9mL of NS to facilitate titration of response.    Admin. Amount: 0.1-0.4 mg = 0.25-1 mL Conc: 0.4 mg/mL  Dispense Loc: [facility]  Volume: 1 mL               ondansetron (ZOFRAN-ODT) ODT tab 4 mg  Dose: 4 mg  Freq: EVERY 6 HOURS PRN Route: PO  PRN Reasons: nausea,vomiting  Start: 10/17/18 2132   Admin Instructions: This is Step 1 of nausea and vomiting management.  If nausea not resolved in 15 minutes, go to Step 2 prochlorperazine (COMPAZINE). Do not push through foil backing. Peel back foil and gently remove. Place on tongue immediately. Administration with liquid unnecessary  With dry hands, peel back foil backing and gently remove tablet; do not push oral disintegrating tablet through foil backing; administer immediately on tongue and oral disintegrating tablet dissolves in seconds; then swallow with saliva; liquid not required.    Admin. Amount: 1 tablet (1 × 4 mg tablet)  Dispense Loc: [facility]              Or  ondansetron (ZOFRAN) injection 4 mg  Dose: 4 mg  Freq: EVERY 6 HOURS PRN Route: IV  PRN Reasons: nausea,vomiting  Start: 10/17/18 2132   Admin Instructions: This is Step 1 of nausea and vomiting management.  If nausea not resolved in 15 minutes, go to Step 2 prochlorperazine (COMPAZINE).  Irritant. For ordered IV doses 0.1-4 mg, give IV Push undiluted over 2-5 minutes.    Admin. Amount: 4 mg = 2 mL Conc: 4 mg/2 mL  Dispense Loc: [facility]  Infused Over: 2-5 Minutes  Volume: 2 mL               paliperidone (INVEGA) 24 hr tablet 9 mg  Dose: 9 mg  Freq: EVERY MORNING Route: PO  Start: 10/19/18 0900   Admin Instructions: DO NOT CRUSH.    Admin. Amount: 3 tablet (3 × 3 mg tablet)  Last Admin: 10/21/18 0840  Dispense Loc: [facility]         1057 (9 mg)-Given        0809 (9 mg)-Given        0840 (9 mg)-Given           potassium chloride (KLOR-CON) Packet 20-40 mEq  Dose: 20-40 mEq  Freq: EVERY 2 HOURS  PRN Route: ORAL OR FEED  PRN Reason: potassium supplementation  Start: 10/17/18 2132   Admin Instructions: Use if unable to tolerate tablets.  If Serum K+ 3.0-3.3, dose = 60 mEq po total dose (40 mEq x1 followed in 2 hours by 20 mEq x1). Recheck K+ level 4 hours after dose and the next AM.  If Serum K+ 2.5-2.9, dose = 80 mEq po total dose (40 mEq Q2H x2). Recheck K+ level 4 hours after dose and the next AM.  If Serum K+ less than 2.5, See IV order.  Dissolve packet contents in 4-8 ounces of cold water or juice.    Admin. Amount: 20-40 mEq  Dispense Loc: [facility]               potassium chloride 10 mEq in 100 mL intermittent infusion with 10 mg lidocaine  Dose: 10 mEq  Freq: EVERY 1 HOUR PRN Route: IV  PRN Reason: potassium supplementation  Start: 10/17/18 2132   Admin Instructions: Infuse via PERIPHERAL LINE. Use potassium with lidocaine for pain with peripheral administration.  If Serum K+ 3.0-3.3, dose = 10 mEq/hr x4 doses (40 mEq IV total dose). Recheck K+ level 2 hours after dose and the next AM.  If Serum K+ less than 3.0, dose = 10 mEq/hr x6 doses (60 mEq IV total dose). Recheck K+ level 2 hours after dose and the next AM.    Admin. Amount: 10 mEq = 100 mL Conc: 10 mEq/100 mL  Dispense Loc: [facility]  Infused Over: 1 Hours  Volume: 100 mL               potassium chloride 10 mEq in 100 mL sterile water intermittent infusion (premix)  Dose: 10 mEq  Freq: EVERY 1 HOUR PRN Route: IV  PRN Reason: potassium supplementation  Start: 10/17/18 2132   Admin Instructions: Infuse via PERIPHERAL LINE or CENTRAL LINE. Use for central line replacement if patient weight less than 65 kg, if patient is on TPN with high potassium content or if unit does not stock 20 mEq bags.   If Serum K+ 3.0-3.3, dose = 10 mEq/hr x4 doses (40 mEq IV total dose). Recheck K+ level 2 hours after dose and the next AM.   If Serum K+ less than 3.0, dose = 10 mEq/hr x6 doses (60 mEq IV total dose). Recheck K+ level 2 hours after dose and the  next AM.    Admin. Amount: 10 mEq = 100 mL Conc: 10 mEq/100 mL  Dispense Loc:  Main Pharmacy  Infused Over: 60 Minutes  Volume: 100 mL               potassium chloride 20 mEq in 50 mL intermittent infusion  Dose: 20 mEq  Freq: EVERY 1 HOUR PRN Route: IV  PRN Reason: potassium supplementation  Start: 10/17/18 2132   Admin Instructions: Infuse via CENTRAL LINE Only. May need EKG if less than 65 kg or on TPN - Max rate is 0.3 mEq/kg/hr for patients not on EKG monitoring.   If Serum K+ 3.0-3.3, dose = 20 mEq/hr x2 doses (40 mEq IV total dose). Recheck K+ level 2 hours after dose and the next AM.  If Serum K+ less than 3.0, dose = 20 mEq/hr x3 doses (60 mEq IV total dose). Recheck K+ level 2 hours after dose and the next AM.    Admin. Amount: 20 mEq = 50 mL Conc: 20 mEq/50 mL  Dispense Loc:  Main Pharmacy  Volume: 50 mL               potassium chloride SA (K-DUR/KLOR-CON M) CR tablet 20-40 mEq  Dose: 20-40 mEq  Freq: EVERY 2 HOURS PRN Route: PO  PRN Reason: potassium supplementation  Start: 10/17/18 2132   Admin Instructions: Use if able to take PO.   If Serum K+ 3.0-3.3, dose = 60 mEq po total dose (40 mEq x1 followed in 2 hours by 20 mEq x1). Recheck K+ level 4 hours after dose and the next AM.  If Serum K+ 2.5-2.9, dose = 80 mEq po total dose (40 mEq Q2H x2). Recheck K+ level 4 hours after dose and the next AM.  If Serum K+ less than 2.5, See IV order.  DO NOT CRUSH    Admin. Amount: 1-2 tablet (1-2 × 20 mEq tablet)  Dispense Loc:  ADS MS5W               senna-docusate (SENOKOT-S;PERICOLACE) 8.6-50 MG per tablet 1 tablet  Dose: 1 tablet  Freq: 2 TIMES DAILY PRN Route: PO  PRN Reason: constipation  Start: 10/19/18 0841   Admin. Amount: 1 tablet  Last Admin: 10/21/18 0840  Dispense Loc:  ADS MS5W           0840 (1 tablet)-Given           sertraline (ZOLOFT) tablet 200 mg  Dose: 200 mg  Freq: DAILY Route: PO  Start: 10/18/18 0900   Admin. Amount: 2 tablet (2 × 100 mg tablet)  Last Admin: 10/21/18 0839  Dispense  Loc: [facility]        1001 (200 mg)-Given        0806 (200 mg)-Given        0810 (200 mg)-Given        0839 (200 mg)-Given           sodium chloride (PF) 0.9% PF flush 3 mL  Dose: 3 mL  Freq: EVERY 8 HOURS Route: IV  Start: 10/17/18 1521   Admin Instructions: And Q1H PRN, to lock peripheral IV dormant line.      Admin. Amount: 3 mL  Last Admin: 10/20/18 1738  Dispense Loc: [facility] Floor Stock  Volume: 4 mL                      (0707)-Not Given       (1558)-Not Given       (2335)-Not Given        (0805)-Not Given       0958 (3 mL)-Given       1548 (3 mL)-Given               0813 (3 mL)-Given       1738 (3 mL)-Given        (0020)-Not Given       (0844)-Not Given       [ ] 1700           sodium chloride (PF) 0.9% PF flush 3 mL  Dose: 3 mL  Freq: EVERY 1 HOUR PRN Route: IV  PRN Reasons: line flush,post meds or blood draw  Start: 10/17/18 1519   Admin Instructions: for peripheral IV flush post IV meds    Admin. Amount: 3 mL  Dispense Loc: [facility] Floor Stock  Volume: 4 mL               topiramate (TOPAMAX) tablet 50 mg  Dose: 50 mg  Freq: 2 TIMES DAILY Route: PO  Start: 10/17/18 2145   Admin. Amount: 2 tablet (2 × 25 mg tablet)  Last Admin: 10/21/18 0839  Dispense Loc: [facility] MS5W       2218 (50 mg)-Given        1001 (50 mg)-Given       2137 (50 mg)-Given        0807 (50 mg)-Given       2059 (50 mg)-Given        0810 (50 mg)-Given       2113 (50 mg)-Given        0839 (50 mg)-Given       [ ] 2100          Discontinued Medications  Medications 10/15/18 10/16/18 10/17/18 10/18/18 10/19/18 10/20/18 10/21/18         Rate: 100 mL/hr   Freq: CONTINUOUS Route: IV  Last Dose: Stopped (10/19/18 5875)  Start: 10/17/18 2145   End: 10/19/18 0840   Last Admin: 10/19/18 0547  Dispense Loc: [facility] Floor Stock  Volume: 1,000 mL       1814 ( )-New Bag        0220 ( )-Rate/Dose Verify       0907 ( )-New Bag       1758 ( )-New Bag        0032 ( )-Rate/Dose Verify       0547 ( )-New Bag       0840-Med Discontinued  0955-Stopped

## 2018-10-17 NOTE — IP AVS SNAPSHOT
[place] [address] [facility]    [address]    [facility] [address]    Phone:  [phone]    Fax:  [phone]                                       After Visit Summary   10/17/2018    Nena Tang    MRN: [id number]           After Visit Summary Signature Page     I have received my discharge instructions, and my questions have been answered. I have discussed any challenges I see with this plan with the nurse or doctor.    ..........................................................................................................................................  Patient/Patient Representative Signature      ..........................................................................................................................................  Patient Representative Print Name and Relationship to Patient    ..................................................               ................................................  Date                                   Time    ..........................................................................................................................................  Reviewed by Signature/Title    ...................................................              ..............................................  Date                                               Time          [id number] Rev 08/18

## 2018-10-17 NOTE — IP AVS SNAPSHOT
[place] [address] MEDICAL SURGICAL: [phone]                                              INTERAGENCY TRANSFER FORM - LAB / IMAGING / EKG / EMG RESULTS   10/17/2018                    Hospital Admission Date: 10/17/2018  ERIK BURNHAM   : 1960  Sex: Female        Attending Provider: Manuelito Smith MD     Allergies:  Imidazole Antifungals, Ketoprofen, Lisinopril, Metformin, Metronidazole, Posaconazole    Infection:  None   Service:  GENERAL MEDI    Ht:  1.524 m (5')   Wt:  108.9 kg (240 lb)   Admission Wt:  99.8 kg (220 lb)    BMI:  46.87 kg/m 2   BSA:  2.15 m 2            Patient PCP Information     Provider PCP Type    ART Fay CNP General         Lab Results - 3 Days      Glucose by meter [[id number]] (Abnormal)  Resulted: 10/21/18 0848, Result status: Final result    Ordering provider: Manuelito Smith MD  10/21/18 08 Resulting lab: POINT OF CARE TEST, GLUCOSE    Specimen Information    Type Source Collected On     10/21/18 0837          Components       Value Reference Range Flag Lab   Glucose 118 70 - 99 mg/dL H 170            Glucose by meter [[id number]] (Abnormal)  Resulted: 10/21/18 0221, Result status: Final result    Ordering provider: Manuelito Smith MD  10/21/18 0200 Resulting lab: POINT OF CARE TEST, GLUCOSE    Specimen Information    Type Source Collected On     10/21/18 0200          Components       Value Reference Range Flag Lab   Glucose 151 70 - 99 mg/dL H 170            Glucose by meter [[id number]] (Abnormal)  Resulted: 10/20/18 2150, Result status: Final result    Ordering provider: Manuelito Smith MD  10/20/18 2118 Resulting lab: POINT OF CARE TEST, GLUCOSE    Specimen Information    Type Source Collected On     10/20/18 2118          Components       Value Reference Range Flag Lab   Glucose 173 70 - 99 mg/dL H 170            Glucose by meter [[id number]] (Abnormal)  Resulted: 10/20/18 1801, Result status: Final result    Ordering  provider: Manuelito Smith MD  10/20/18 1736 Resulting lab: POINT OF CARE TEST, GLUCOSE    Specimen Information    Type Source Collected On     10/20/18 1736          Components       Value Reference Range Flag Lab   Glucose 104 70 - 99 mg/dL H 170            Glucose by meter [[id number]]  Resulted: 10/20/18 1307, Result status: Final result    Ordering provider: Manuelito Smith MD  10/20/18 1252 Resulting lab: POINT OF CARE TEST, GLUCOSE    Specimen Information    Type Source Collected On     10/20/18 1252          Components       Value Reference Range Flag Lab   Glucose 89 70 - 99 mg/dL  170            Glucose by meter [[id number]]  Resulted: 10/20/18 0919, Result status: Final result    Ordering provider: Manuelito Smith MD  10/20/18 0901 Resulting lab: POINT OF CARE TEST, GLUCOSE    Specimen Information    Type Source Collected On     10/20/18 0901          Components       Value Reference Range Flag Lab   Glucose 90 70 - 99 mg/dL  170            Blood culture [[id number]]  Resulted: 10/20/18 0253, Result status: Preliminary result    Ordering provider: Nick Mclaughlin MD  10/17/18 1521 Resulting lab: [facility]    Specimen Information    Type Source Collected On   Blood  10/17/18 1530   Comment:  Left Arm          Components       Value Reference Range Flag Lab   Specimen Description Blood Left Arm      Special Requests Aerobic and anaerobic bottles received   75   Culture Micro No growth after 3 days   225            Blood culture [[id number]]  Resulted: 10/20/18 0253, Result status: Preliminary result    Ordering provider: Nick Mclaughlin MD  10/17/18 1525 Resulting lab: [facility]    Specimen Information    Type Source Collected On   Blood  10/17/18 1619   Comment:  Left Hand          Components       Value Reference Range Flag Lab   Specimen Description Blood Left Hand      Special Requests Aerobic and anaerobic bottles  received   75   Culture Micro No growth after 3 days   225            Glucose by meter [[id number]] (Abnormal)  Resulted: 10/20/18 0235, Result status: Final result    Ordering provider: Manuelito Smith MD  10/20/18 0222 Resulting lab: POINT OF CARE TEST, GLUCOSE    Specimen Information    Type Source Collected On     10/20/18 0222          Components       Value Reference Range Flag Lab   Glucose 101 70 - 99 mg/dL H 170            Glucose by meter [[id number]] (Abnormal)  Resulted: 10/19/18 2139, Result status: Final result    Ordering provider: Manuelito Smith MD  10/19/18 2058 Resulting lab: POINT OF CARE TEST, GLUCOSE    Specimen Information    Type Source Collected On     10/19/18 2058          Components       Value Reference Range Flag Lab   Glucose 192 70 - 99 mg/dL H 170            Glucose by meter [[id number]]  Resulted: 10/19/18 1737, Result status: Final result    Ordering provider: Manuelito Smith MD  10/19/18 1722 Resulting lab: POINT OF CARE TEST, GLUCOSE    Specimen Information    Type Source Collected On     10/19/18 1722          Components       Value Reference Range Flag Lab   Glucose 92 70 - 99 mg/dL  170            Glucose by meter [[id number]] (Abnormal)  Resulted: 10/19/18 1115, Result status: Final result    Ordering provider: Manuelito Smith MD  10/19/18 1055 Resulting lab: POINT OF CARE TEST, GLUCOSE    Specimen Information    Type Source Collected On     10/19/18 1055          Components       Value Reference Range Flag Lab   Glucose 109 70 - 99 mg/dL H 170            Basic metabolic panel [[id number]] (Abnormal)  Resulted: 10/19/18 0827, Result status: Final result    Ordering provider: Manuelito Smith MD  10/19/18 0000 Resulting lab: [facility]    Specimen Information    Type Source Collected On   Blood  10/19/18 0743          Components       Value Reference Range Flag Lab   Sodium 139 133 - 144 mmol/L  FrRdHs   Potassium 3.9 3.4 - 5.3 mmol/L   FrRdHs   Chloride 113 94 - 109 mmol/L H FrRdHs   Carbon Dioxide 20 20 - 32 mmol/L  FrRdHs   Anion Gap 6 3 - 14 mmol/L  FrRdHs   Glucose 84 70 - 99 mg/dL  FrRdHs   Urea Nitrogen 12 7 - 30 mg/dL  FrRdHs   Creatinine 0.89 0.52 - 1.04 mg/dL  FrRdHs   GFR Estimate 66 >60 mL/min/1.7m2  FrRdHs   Comment:  Non  GFR Calc   GFR Estimate If Black 79 >60 mL/min/1.7m2  FrRdHs   Comment:  African American GFR Calc   Calcium 8.0 8.5 - 10.1 mg/dL L FrRdHs            Glucose by meter [[id number]]  Resulted: 10/19/18 0821, Result status: Final result    Ordering provider: Manuelito Smith MD  10/19/18 0803 Resulting lab: POINT OF CARE TEST, GLUCOSE    Specimen Information    Type Source Collected On     10/19/18 0803          Components       Value Reference Range Flag Lab   Glucose 84 70 - 99 mg/dL  170            CBC with platelets differential [[id number]] (Abnormal)  Resulted: 10/19/18 0803, Result status: Final result    Ordering provider: Manuelito Smith MD  10/19/18 0000 Resulting lab: [facility]    Specimen Information    Type Source Collected On   Blood  10/19/18 0743          Components       Value Reference Range Flag Lab   WBC 10.7 4.0 - 11.0 10e9/L  FrRdHs   RBC Count 2.68 3.8 - 5.2 10e12/L L FrRdHs   Hemoglobin 8.3 11.7 - 15.7 g/dL L FrRdHs   Hematocrit 26.4 35.0 - 47.0 % L FrRdHs   MCV 99 78 - 100 fl  FrRdHs   MCH 31.0 26.5 - 33.0 pg  FrRdHs   MCHC 31.4 31.5 - 36.5 g/dL L FrRdHs   RDW 13.4 10.0 - 15.0 %  FrRdHs   Platelet Count 157 150 - 450 10e9/L  FrRdHs   Diff Method Automated Method   FrRdHs   % Neutrophils 70.6 %  FrRdHs   % Lymphocytes 19.0 %  FrRdHs   % Monocytes 8.1 %  FrRdHs   % Eosinophils 1.5 %  FrRdHs   % Basophils 0.2 %  FrRdHs   % Immature Granulocytes 0.6 %  FrRdHs   Nucleated RBCs 0 0 /100  FrRdHs   Absolute Neutrophil 7.5 1.6 - 8.3 10e9/L  FrRdHs   Absolute Lymphocytes 2.0 0.8 - 5.3 10e9/L  FrRdHs   Absolute Monocytes 0.9 0.0 - 1.3 10e9/L  FrRdHs   Absolute  Eosinophils 0.2 0.0 - 0.7 10e9/L  FrRdHs   Absolute Basophils 0.0 0.0 - 0.2 10e9/L  FrRdHs   Abs Immature Granulocytes 0.1 0 - 0.4 10e9/L  FrRdHs   Absolute Nucleated RBC 0.0   FrRdHs            Glucose by meter [[id number]] (Abnormal)  Resulted: 10/19/18 0236, Result status: Final result    Ordering provider: Manuelito Smith MD  10/19/18 0223 Resulting lab: POINT OF CARE TEST, GLUCOSE    Specimen Information    Type Source Collected On     10/19/18 0223          Components       Value Reference Range Flag Lab   Glucose 107 70 - 99 mg/dL H 170            Urine Culture Aerobic Bacterial [[id number]]  Resulted: 10/18/18 2145, Result status: Final result    Ordering provider: Nick Mclaughlin MD  10/17/18 1521 Resulting lab: [facility]    Specimen Information    Type Source Collected On   Catheterized Urine  10/17/18 1805          Components       Value Reference Range Flag Lab   Specimen Description Catheterized Urine      Special Requests Specimen received in preservative   75   Culture Micro No growth   225            Glucose by meter [[id number]] (Abnormal)  Resulted: 10/18/18 2137, Result status: Final result    Ordering provider: Manuelito Smith MD  10/18/18 2108 Resulting lab: POINT OF CARE TEST, GLUCOSE    Specimen Information    Type Source Collected On     10/18/18 2108          Components       Value Reference Range Flag Lab   Glucose 229 70 - 99 mg/dL H 170            Glucose by meter [[id number]] (Abnormal)  Resulted: 10/18/18 1734, Result status: Final result    Ordering provider: Manuelito Smith MD  10/18/18 1722 Resulting lab: POINT OF CARE TEST, GLUCOSE    Specimen Information    Type Source Collected On     10/18/18 1722          Components       Value Reference Range Flag Lab   Glucose 103 70 - 99 mg/dL H 170            Glucose by meter [[id number]]  Resulted: 10/18/18 0928, Result status: Final result    Ordering provider: Manuelito Smith MD   10/18/18 0902 Resulting lab: POINT OF CARE TEST, GLUCOSE    Specimen Information    Type Source Collected On     10/18/18 0902          Components       Value Reference Range Flag Lab   Glucose 92 70 - 99 mg/dL  170            Basic metabolic panel [[id number]] (Abnormal)  Resulted: 10/18/18 0742, Result status: Final result    Ordering provider: Manuelito Smith MD  10/18/18 0001 Resulting lab: [facility]    Specimen Information    Type Source Collected On   Blood  10/18/18 0659          Components       Value Reference Range Flag Lab   Sodium 137 133 - 144 mmol/L  FrRdHs   Potassium 3.6 3.4 - 5.3 mmol/L  FrRdHs   Chloride 108 94 - 109 mmol/L  FrRdHs   Carbon Dioxide 21 20 - 32 mmol/L  FrRdHs   Anion Gap 8 3 - 14 mmol/L  FrRdHs   Glucose 99 70 - 99 mg/dL  FrRdHs   Urea Nitrogen 15 7 - 30 mg/dL  FrRdHs   Creatinine 1.09 0.52 - 1.04 mg/dL H FrRdHs   GFR Estimate 52 >60 mL/min/1.7m2 L FrRdHs   Comment:  Non  GFR Calc   GFR Estimate If Black 62 >60 mL/min/1.7m2  FrRdHs   Comment:  African American GFR Calc   Calcium 8.3 8.5 - 10.1 mg/dL L FrRdHs            Magnesium [[id number]]  Resulted: 10/18/18 0742, Result status: Final result    Ordering provider: Manuelito Smith MD  10/18/18 0001 Resulting lab: [facility]    Specimen Information    Type Source Collected On   Blood  10/18/18 0659          Components       Value Reference Range Flag Lab   Magnesium 1.7 1.6 - 2.3 mg/dL  FrRdHs            CBC with platelets differential [[id number]] (Abnormal)  Resulted: 10/18/18 0733, Result status: Final result    Ordering provider: Manuelito Smith MD  10/18/18 0001 Resulting lab: [facility]    Specimen Information    Type Source Collected On   Blood  10/18/18 0659          Components       Value Reference Range Flag Lab   WBC 17.6 4.0 - 11.0 10e9/L H FrRdHs   RBC Count 2.91 3.8 - 5.2 10e12/L L FrRdHs   Hemoglobin 9.0 11.7 - 15.7 g/dL L FrRdHs   Hematocrit  28.1 35.0 - 47.0 % L FrRdHs   MCV 97 78 - 100 fl  FrRdHs   MCH 30.9 26.5 - 33.0 pg  FrRdHs   MCHC 32.0 31.5 - 36.5 g/dL  FrRdHs   RDW 13.4 10.0 - 15.0 %  FrRdHs   Platelet Count 180 150 - 450 10e9/L  FrRdHs   Diff Method Automated Method   FrRdHs   % Neutrophils 75.4 %  FrRdHs   % Lymphocytes 15.0 %  FrRdHs   % Monocytes 8.6 %  FrRdHs   % Eosinophils 0.2 %  FrRdHs   % Basophils 0.2 %  FrRdHs   % Immature Granulocytes 0.6 %  FrRdHs   Nucleated RBCs 0 0 /100  FrRdHs   Absolute Neutrophil 13.3 1.6 - 8.3 10e9/L H FrRdHs   Absolute Lymphocytes 2.6 0.8 - 5.3 10e9/L  FrRdHs   Absolute Monocytes 1.5 0.0 - 1.3 10e9/L H FrRdHs   Absolute Eosinophils 0.0 0.0 - 0.7 10e9/L  FrRdHs   Absolute Basophils 0.0 0.0 - 0.2 10e9/L  FrRdHs   Abs Immature Granulocytes 0.1 0 - 0.4 10e9/L  FrRdHs   Absolute Nucleated RBC 0.0   FrRdHs            Lactic acid level STAT for sepsis protocol [[id number]] (Abnormal)  Resulted: 10/18/18 0731, Result status: Final result    Ordering provider: Manuelito Smith MD  10/18/18 0706 Resulting lab: [facility]    Specimen Information    Type Source Collected On   Blood  10/18/18 0720          Components       Value Reference Range Flag Lab   Lactate for Sepsis Protocol 0.5 0.7 - 2.0 mmol/L L FrRdHs            Glucose by meter [[id number]] (Abnormal)  Resulted: 10/18/18 0217, Result status: Final result    Ordering provider: Manuelito Smith MD  10/18/18 0153 Resulting lab: POINT OF CARE TEST, GLUCOSE    Specimen Information    Type Source Collected On     10/18/18 0153          Components       Value Reference Range Flag Lab   Glucose 167 70 - 99 mg/dL H 170            Testing Performed By     Lab - Abbreviation Name Director Address Valid Date Range    12 - FrRdHs [facility] Unknown [address] [facility] [address] 05/08/15 1057 - Present    75 - Unknown [facility] Unknown [address] [facility] [address] 01/15/15 1019 - Present  "   170 - Unknown POINT OF CARE TEST, GLUCOSE Unknown Unknown 10/31/11 1114 - Present    225 - Unknown [facility] Unknown [address] [facility] [address] 12/19/14 0954 - Present            Unresulted Labs (24h ago through future)    Start       Ordered    Unscheduled  Potassium  (Potassium Replacement - \"Standard\" - For K levels less than 3.4 mmol/L - UU,UR,UA,RH,SH,PH,WY )  CONDITIONAL (SPECIFY),   Routine     Comments:  Obtain Potassium Level for these conditions:  *IF no potassium result within 24 hours before initiation of order set, draw potassium level with next lab collect.    *2 HOURS AFTER last IV potassium replacement dose and 4 hours after an oral replacement dose.  *Next morning after potassium dose.     Repeat Potassium Replacement if necessary.    10/17/18 2132    Unscheduled  Magnesium  (Magnesium Replacement -  Adult - \"Standard\" - Replacement for all levels less than 1.6 mg/dL )  CONDITIONAL (SPECIFY),   Routine     Comments:  Obtain Magnesium Level for these conditions:  *IF no magnesium result within 24 hrs before initiation of order set, draw magnesium level with next lab collect.    *2 HOURS AFTER last magnesium replacement dose when magnesium replacement given for level less than 1.6   *Next morning after magnesium dose.     Repeat Magnesium Replacement if necessary.    10/17/18 2132      Encounter-Level Documents:     There are no encounter-level documents.      Order-Level Documents:     There are no order-level documents.      "

## 2018-10-17 NOTE — LETTER
Transition Communication Hand-off for Care Transitions to Next Level of Care Provider    Name: Nena Tang  : 1960  MRN #: [id number]  Primary Care Provider: Rowena Haas  Primary Care MD Name: anoop   Primary Clinic: [address] [place], [place] [address] [address]  [facility] [address]  Primary Care Clinic Name: [facility]   Reason for Hospitalization:  Shortness of breath [R06.02]  Productive cough [R05]  Sepsis, due to unspecified organism (H) [A41.9]  Admit Date/Time: 10/17/2018  3:03 PM  Discharge Date: 10/21/18  Payor Source: Payor: MEDICARE / Plan: MEDICARE / Product Type: Medicare /     Readmission Assessment Measure (JOHANNE) Risk Score/category: AVERAGE         Reason for Communication Hand-off Referral: Admission diagnoses: PN    Discharge Plan:       Concern for non-adherence with plan of care:   NO  Discharge Needs Assessment:  Needs       Most Recent Value    Equipment Currently Used at Home cane, quad    Other Resources [facility] Worker    [facility] Worker Name pt unsure     [facility] Worker Status Active          Already enrolled in Tele-monitoring program and name of program:  na  Follow-up specialty is recommended: No    Follow-up plan:  Future Appointments  Date Time Provider Department Center   10/29/2018 1:00 PM Kiko Gallego, [facility]   2018 9:30 AM Richardson Lopez, LICSW [facility]   2018 9:30 AM Rowena Haas APRN CNP [facility]   2018 9:30 AM Eugenia De Jesus, [facility]   2018 1:30 PM Kraig Pedersen MD [facility]   3/14/2019 3:15 PM [facility]acon [facility]       Any outstanding tests or procedures:             Reason for your hospital stay       Your admitted earlier with increasing fever, generalized weakness, decreased oral intake, coughing spells and found to have high fever spikes.   Consideration for bacterial infection but with no clear-cut evidence of pneumonia or UTI were entertained and he was provided with antibiotics  during her stay.                Key Recommendations:  Pt was admitted with PNA and UTI.  8 visits/admits this year.  Pt was dc'd to .      Pam Flowers    AVS/Discharge Summary is the source of truth; this is a helpful guide for improved communication of patient story

## 2018-10-17 NOTE — ED NOTES
[facility]  ED Nurse Handoff Report    Nena Tang is a 58 year old female   ED Chief complaint: Cough  . ED Diagnosis:   Final diagnoses:   Sepsis, due to unspecified organism (H)   Productive cough - possible early pneumonia   Shortness of breath     Allergies:   Allergies   Allergen Reactions     Imidazole Antifungals Hives     Tolerates diflucan     Ketoprofen Itching     Pruritis to topical     Lisinopril Hives     Metformin Other (See Comments)     Patient hospitalized for lactic acidosis - admitting provider suspectd caused by metformin     Metronidazole Hives     Posaconazole Hives     Tolerates diflucan       Code Status: Full Code  Activity level - Baseline/Home:  Stand with Assist. Activity Level - Current:   Stand with Assist. Lift room needed: No. Bariatric: No   Needed: No   Isolation: No. Infection: Not Applicable.     Vital Signs:   Vitals:    10/17/18 1701 10/17/18 1800 10/17/18 1813 10/17/18 1830   BP: 152/76 151/80     Pulse:       Resp:       Temp:   102.8  F (39.3  C) 102.8  F (39.3  C)   TempSrc:   Oral    SpO2: 92% 93%     Weight:           Cardiac Rhythm:  ,      Pain level:    Patient confused: No. Patient Falls Risk: Yes.   Elimination Status: Has voided   Patient Report - Initial Complaint: Hypoglycemia; risk for infection; cough/fever . Focused Assessment:  Recent Fall History - Fall history within last six months: yes. Abnormal Result -   Glucose: 172 mg/dL [Ref Range: 70 - 99]. Febrile w/ nonproductive cough. Ambulates with assistance. Gait is slow but fairly steady.  Tests Performed:   Labs Ordered and Resulted from Time of ED Arrival Up to the Time of Departure from the ED   GLUCOSE BY METER - Abnormal; Notable for the following:        Result Value    Glucose 172 (*)     All other components within normal limits   COMPREHENSIVE METABOLIC PANEL - Abnormal; Notable for the following:     Glucose 173 (*)     GFR Estimate 54 (*)     All other components  within normal limits   CBC WITH PLATELETS DIFFERENTIAL - Abnormal; Notable for the following:     WBC 12.1 (*)     RBC Count 3.39 (*)     Hemoglobin 10.5 (*)     Hematocrit 33.2 (*)     Absolute Neutrophil 10.0 (*)     All other components within normal limits   LACTIC ACID WHOLE BLOOD - Abnormal; Notable for the following:     Lactic Acid 2.1 (*)     All other components within normal limits   ROUTINE UA WITH MICROSCOPIC - Abnormal; Notable for the following:     Mucous Urine Present (*)     All other components within normal limits   ISTAT  GASES LACTATE DOMINGO POCT - Abnormal; Notable for the following:     Ph Venous 7.31 (*)     PO2 Venous 23 (*)     All other components within normal limits   LACTIC ACID WHOLE BLOOD   PERIPHERAL IV CATHETER   PULSE OXIMETRY NURSING   CARDIAC CONTINUOUS MONITORING   NURSING DRAW AND HOLD   NURSING DRAW AND HOLD   NURSING DRAW AND HOLD   ISTAT CG4 GASES LACTATE DOMINGO NURSING POCT   PULSE OXIMETRY NURSING   CARDIAC CONTINUOUS MONITORING   MEASURE URINE OUTPUT   PATIENT CARE ORDER   VITAL SIGNS   TEMPERATURE PROBE   STRAIGHT CATH FOR URINE   URINE CULTURE AEROBIC BACTERIAL   BLOOD CULTURE   INFLUENZA A/B ANTIGEN   BLOOD CULTURE   . Abnormal Results:   XR Chest 2 Views   Final Result   IMPRESSION: Allowing for suboptimal inspiration, the lungs appear   grossly clear. There may be mild vascular congestion in the upper   lobes. However, no pleural effusions are demonstrated.      ALONSO MONTEIRO MD      .   Treatments provided: Glucose check, abx, fluids, tylenol.  Family Comments: lives in a group home.  OBS brochure/video discussed/provided to patient:  Yes  ED Medications:   Medications   lidocaine 1 % 1 mL (not administered)   lidocaine (LMX4) cream (not administered)   sodium chloride (PF) 0.9% PF flush 3 mL (not administered)   sodium chloride (PF) 0.9% PF flush 3 mL (not administered)   sodium chloride 0.9% infusion (not administered)   cefTRIAXone (ROCEPHIN) 2 g vial to attach to NS  100 ml bag for ADULTS or NS 50 ml bag for PEDS (2 g Intravenous New Bag 10/17/18 1832)   azithromycin (ZITHROMAX) 500 mg in sodium chloride 0.9 % 250 mL intermittent infusion (not administered)   0.9% sodium chloride BOLUS (0 mLs Intravenous Stopped 10/17/18 1734)   0.9% sodium chloride BOLUS (0 mLs Intravenous Stopped 10/17/18 1812)   acetaminophen (TYLENOL) tablet 500 mg (500 mg Oral Given 10/17/18 1603)     Drips infusing:  Yes  For the majority of the shift, the patient's behavior Green. Interventions performed were Labs, urine sent, abx, fluids.     Severe Sepsis OR Septic Shock Diagnosis Present: No      ED Nurse Name/Phone Number: Dieudonne Razo,   6:46 PM  RECEIVING UNIT ED HANDOFF REVIEW    Above ED Nurse Handoff Report was reviewed: Yes  Reviewed by: Mary Edward on October 17, 2018 at 8:50 PM

## 2018-10-17 NOTE — ED PROVIDER NOTES
"  History     Chief Complaint:  Cough    HPI   Nena Tang is a 58 year old female with a history of pneumonia who presents to the emergency department today with cough. The patient has been feeling down and feverish just this afternoon. She states she has been feeling \"down and out\" over the last week with congestion and rhinorrhea. She has had productive coughing over the past 3-4 days with associated shortness of breath and she states that this is not normal for her. Further, the patient endorses abdominal pain, loose stool for the last couple days, back pain (which is chronic for her), urinary frequency, dysuria. She denies any neck stiffness or headaches. Of note, the patient lost her  in the past and went through a period where she \"gave up on life and tried to commit suicide multiple times.\" Because of this she now lives in a group home although denies that she has any feelings of hurting or killing herself here in the emergency department.  The patient denies history of COPD, heart or lung problems, or chest pain here in the emergency department.     Allergies:  Imidazole Antifungals  Ketoprofen  Lisinopril  Metformin  Metronidazole  Posaconazole    Medications:    Albuterol  Aspirin   Lipitor  Cogentin   Diphenhydramine   trulicity   Flonase  Gabapentin   Insulin aspart   Insulin glargine   Insulin pen needle  Losartan   Metoprolol succinate   Invega  Miralax  Compazine   Ranitidine   Senna-docusate  Zoloft   Sumatriptan   Topiramate    Past Medical History:    Coronary artery disease  Chronic low back pain   Cocaine abuse, in remission   Fecal urgency   History of heroin abuse  Hyperlipidemia   Hypertension   Illiterate  Irritable bowel syndrome   Left cataract  Migraine   Moderate major depression   Noncompliance with medication regimen   Obesity   Obstructive sleep apnea  Osteopenia   Schizoaffective disorder  Suicidal intent   Takotsubo cario myopathy   Type 2 Diabetes   Uterine cancer  "   Verbal auditory hallucination   Rotator cuff syndrome   Cervicalgia   Dyskinesia   Gastroesophageal Reflux Disease  Pneumonia  Sepsis   Encephalopathy     Past Surgical History:    C oophorectomy   Cataract   Colonoscopy   Coronary CTA  Hysterectomy  Laparoscopic Cholecystectomy   Phacoemulsification clear cornea with standard intraocular lens implant x2  Release trigger finger x2    Family History:    Cancer  COPD   Cirrhosis of liver   Alcohol/drug  Diabetes   Hypertension   Hyperlipidemia   Coronary artery disease  Glaucoma   Mental illness  Psychotic disorder   Colorectal cancer     Social History:  The patient was alone in the emergency department.  Smoking Status: Never smoker   Smokeless Tobacco: Never used   Alcohol Use: No   Marital Status:        Review of Systems   Constitutional: Positive for diaphoresis and fever.   HENT: Positive for congestion and sneezing. Negative for sore throat.    Respiratory: Positive for cough and shortness of breath.    Cardiovascular: Negative for chest pain.   Gastrointestinal: Positive for abdominal pain and blood in stool.   Genitourinary: Positive for dysuria, frequency and urgency. Negative for hematuria.   Musculoskeletal: Positive for arthralgias, back pain (chronic low back pain) and myalgias. Negative for neck stiffness.   Neurological: Negative for dizziness and headaches.   All other systems reviewed and are negative.    Physical Exam   First Vitals:  Patient Vitals for the past 24 hrs:   BP Temp Temp src Pulse Resp SpO2 Weight   10/17/18 1830 - 102.8  F (39.3  C) - - - - -   10/17/18 1813 - 102.8  F (39.3  C) Oral - - - -   10/17/18 1800 151/80 - - - - 93 % -   10/17/18 1701 152/76 - - - - 92 % -   10/17/18 1615 159/78 - - - - 92 % -   10/17/18 1600 148/89 - - - - 97 % -   10/17/18 1545 150/75 - - - - 94 % -   10/17/18 1530 160/58 - - - - - -   10/17/18 1515 160/80 - - - - 97 % -   10/17/18 1513 156/81 103  F (39.4  C) Oral 104 20 95 % 99.8 kg (220 lb)        Physical Exam  General: Alert, no acute distress; ill appearing  Neuro:  PERRL.  EOMI.  No focal deficits; CN II-XII grossly intact  HEENT:  Moist mucous membranes.  Posterior oropharynx clear, no exudates.  Conjunctiva normal. TMs clear bilaterally; no meningismus; jolt test negative  CV:  Tachycardic, regular rhythm, no m/r/g, skin warm and well perfused  Pulm:  Decreased breath sounds bilaterally with bilateral rales; no wheezing. No acute distress, breathing comfortably  GI:  Soft, nontender, nondistended.  No rebound or guarding.  Normal bowel sounds  MSK:  Moving all extremities.  No focal areas of edema, erythema, or tenderness  Skin:  WWP, no rashes, no lower extremity edema, skin color normal, no diaphoresis  Psych:  Well-appearing, normal affect, regular speech  Emergency Department Course   Imaging:  Radiology findings were communicated with the patient who voiced understanding of the findings.    XR Chest 2 Views:   IMPRESSION: Allowing for suboptimal inspiration, the lungs appear  grossly clear. There may be mild vascular congestion in the upper  lobes. However, no pleural effusions are demonstrated.  Report per radiology     Laboratory:  Laboratory findings were communicated with the patient who voiced understanding of the findings.    ISTAT gases lactate jocelyn POCT: pH Venous 7.31 (L), PCO2 Venous 45, PO2 Venous 23 (L), Bicarbonate Venous 2, O2 Sat Venous 33, Lactic Acid 2.0    Blood culture: Pending     Influenza A/B antigen: Negative     Lactic Acid: 1.4    Lactic acid: 2.1 (H)     UA: yellow clear urine mucous urine present o/w WNL    Urine culture aerobic bacteria only: Pending     CMP: Glucose 173 (H), GFR Estimate 54 (L) o/wWNL (Creatinine 1.04)    CBC: WBC 12.1 (H), HGB 10.5 (L) o/w WNL.  ()     Glucose by meter: 172 (H)    Interventions:  1603: Tylenol 500 mg PO  1533: NS 1L IV Bolus   1735: NS 1L IV Bolus   1911: Zithromax 500 mg IV      Emergency Department Course:  Nursing notes  and vitals reviewed.  The patient provided a urine sample here in the emergency department. This was sent for laboratory testing, findings above.  IV was inserted and blood was drawn for laboratory testing, results above.  The patient was sent for a XR Chest 2 Views while in the emergency department, results above.   1515: I performed an exam of the patient as documented above.     Findings and plan explained to the Patient who consents to admission. Discussed the patient with Dr. Smith, who will admit the patient to a medical bed for further monitoring, evaluation, and treatment.    I personally reviewed the laboratory and imaging results with the Patient and answered all related questions prior to admission.  Impression & Plan    Medical Decision Making:  Nena Tang is a 58 year old female who presents to the emergency department today with cough. She has been having URI symptoms over the last week. She also complains of dysuria and productive coughing with URI symptoms over the last week . She presents here to the emergency department from her group home for further evaluatiion. She is noted to be febrile to 103 and tachycardic per EMS, she did have hypoxia on room air to 89% initially.  She is on room air here with oxygen saturations from low to mid 90s. Exam is above. Lactate is slightly elevated (2.1) and she does have leukocytosis. She is otherwise hemodynamically stable. Influenza swab was negative. Given concern for sepsis, blood clultures were sent and IV fluids were given. She did have improvement of her lactate. Suprisingly, her chest x-ray was unrermarkable for any obvious penumonia. Urine is unremarkable. Given main complaint is coughing and some shortness of breath, I suspect that this may be early signs of pneumonia and will treat for community aquired pneumonia at this time. Otherwise, no headache or neck stiffness suggest meningitis. She does complain of chronic low back pain and has been  having difficulty with ambulating over the last month but I do not suspect cauda equina, spinal abscess causing her symptoms that would require more advanced imaging. I will admit to medicine for continued cares. The patient remained hemodynamically stable and was admitted in stable condition.       Diagnosis:    ICD-10-CM    1. Sepsis, due to unspecified organism (H) A41.9 Lactic acid whole blood   2. Productive cough R05     possible early pneumonia   3. Shortness of breath R06.02        Disposition:  Admitted to medical bed.   Jayson Garcia  10/17/2018   [facility] EMERGENCY DEPARTMENT  Scribe Disclosure:  I, Jayson Garcia, am serving as a scribe at 3:11 PM on 10/17/2018 to document services personally performed by Nick Mclaughlin MD based on my observations and the provider's statements to me.        Nick Mclaughlin MD  10/18/18 0102

## 2018-10-17 NOTE — IP AVS SNAPSHOT
[place] [address] MEDICAL SURGICAL: [phone]                                              INTERAGENCY TRANSFER FORM - PHYSICIAN ORDERS   10/17/2018                    Hospital Admission Date: 10/17/2018  ERIK BURNHAM   : 1960  Sex: Female        Attending Provider: Manuelito Smith MD     Allergies:  Imidazole Antifungals, Ketoprofen, Lisinopril, Metformin, Metronidazole, Posaconazole    Infection:  None   Service:  GENERAL MEDI    Ht:  1.524 m (5')   Wt:  108.9 kg (240 lb)   Admission Wt:  99.8 kg (220 lb)    BMI:  46.87 kg/m 2   BSA:  2.15 m 2            Patient PCP Information     Provider PCP Type    ART Fay CNP General      ED Clinical Impression     Diagnosis Description Comment Added By Time Added    Sepsis, due to unspecified organism (H) [A41.9] Sepsis, due to unspecified organism (H) [A41.9]  Nick Mclaughlin MD 10/17/2018  6:39 PM    Productive cough [R05] Productive cough [R05] possible early pneumonia Nick Mclaughlin MD 10/17/2018  6:40 PM    Shortness of breath [R06.02] Shortness of breath [R06.02]  Nick Mclaughlin MD 10/17/2018  6:40 PM      Hospital Problems as of 10/21/2018              Priority Class Noted POA    Sepsis (H) Medium  2017 Yes      Non-Hospital Problems as of 10/21/2018              Priority Class Noted    Osteopenia Low  10/7/2009    Vitamin B12 deficiency without anemia Low  2009    Hyperlipidemia LDL goal <100 Medium  10/31/2010    Rotator cuff syndrome Medium  2011    Type 2 diabetes mellitus with mild nonproliferative retinopathy (H) Medium  2011    Illiterate Medium  2011    Irritable bowel syndrome Low  Unknown    overweight - BMI >35 Low  Unknown    Takotsubo cardiomyopathy Low  Unknown    CAD (coronary artery disease) High  2012    Restless legs syndrome (RLS) Low  2012    CINDY (obstructive sleep apnea)- mild AHI 10.3 Medium  3/8/2012    Verbal auditory hallucination Medium   10/4/2012    Chronic low back pain Medium  1/22/2013    Schizoaffective disorder, depressive type (H) Medium  2/25/2013    Migraine headache Medium  4/22/2013    HTN, goal below 140/90 Medium  7/29/2013    Health Care Home Medium  8/16/2013    Lumbago Medium  9/20/2013    Cervicalgia Medium  9/20/2013    Cocaine abuse, episodic (H) Medium  10/3/2013    Suicidal ideation Medium  5/1/2014    Esophageal reflux Medium  6/9/2014    Mild nonproliferative diabetic retinopathy (H) Medium  6/19/2014    Tardive dyskinesia Medium  9/11/2015    Alcohol use Medium  4/19/2016    Left cataract Medium  7/25/2016    Falls frequently Medium  8/18/2016    History of uterine cancer Medium  12/7/2016    Psychophysiological insomnia Medium  3/9/2017    Dysuria Medium  6/12/2017    Asymptomatic postmenopausal status Medium  6/28/2017    Abdominal pain, right lower quadrant Medium  7/13/2017    Pneumonia of right lower lobe due to infectious organism (H) Medium  9/4/2017    Infectious encephalopathy Medium  9/4/2017    Non-intractable vomiting with nausea Medium  9/4/2017    Acute respiratory failure with hypoxia (H) Medium  9/4/2017    Thoughts of self harm Medium  10/23/2017    Gastroenteritis Medium  12/8/2017    Posttraumatic stress disorder Medium  1/29/2018    Cervical cancer screening Medium  6/1/2018    Type 2 diabetes mellitus with stage 3 chronic kidney disease, with long-term current use of insulin (H) Medium  6/5/2018      Code Status History     Date Active Date Inactive Code Status Order ID Comments User Context    10/21/2018  9:52 AM  Full Code [id number]  Manuelito Smith MD Outpatient    10/17/2018  9:33 PM 10/21/2018  9:52 AM Full Code [id number]  Manuelito Smith MD Inpatient    1/17/2018  1:35 PM 10/17/2018  9:33 PM Full Code [id number]  Cristian Avalos MD Outpatient    1/14/2018  7:48 AM 1/17/2018  1:35 PM Full Code [id number]  Jacki Allen RN Inpatient    12/8/2017  1:37 AM 12/9/2017  6:34 PM  Full Code [id number]  Karthikeyan Miles MD Inpatient    11/6/2017 10:57 PM 11/10/2017  8:16 PM Full Code [id number]  Fadi Pearl MD Inpatient    9/4/2017  9:30 AM 11/6/2017 10:57 PM Full Code [id number]  Manfred Frost MD Outpatient    8/29/2017  6:12 PM 9/4/2017  9:30 AM Full Code [id number]  Belgica Hall MD Inpatient    12/28/2016  6:02 PM 12/30/2016  4:20 PM Full Code [id number]  Estella Smith RN Inpatient    12/26/2016  9:48 AM 12/28/2016  6:02 PM Full Code [id number]  Albino Castañeda MD Outpatient    10/24/2016 10:48 PM 10/27/2016  5:39 PM Full Code [id number]  Rosa Soto RN Inpatient    5/4/2016  3:02 PM 10/24/2016 10:48 PM Full Code [id number]  Chava Barrios MD Outpatient    5/3/2016  3:27 AM 5/4/2016  3:02 PM Full Code [id number]  Roverto Fitzgerald MD Inpatient    2/15/2016  1:54 AM 2/22/2016  5:18 PM Full Code [id number]  Codie Coates MD Inpatient    6/17/2015  9:02 PM 6/29/2015  4:02 PM Full Code [id number]  Manjinder Escamilla RN Inpatient    3/7/2015  7:55 PM 3/16/2015  2:54 PM Full Code [id number]  Ada Villela RN Inpatient    1/6/2015  7:17 AM 1/7/2015  6:05 PM Full Code [id number]  Michael Brown MD Inpatient    12/13/2014  2:18 AM 12/23/2014  2:06 PM Full Code [id number]  Jean Claude Meza MD Inpatient    5/29/2014 10:00 AM 12/13/2014  2:18 AM Full Code [id number]  DELROY Gomez MD Outpatient    5/29/2014  1:15 AM 5/29/2014 10:00 AM Full Code [id number]  Cha Denny DO Inpatient    5/1/2014  9:00 PM 5/8/2014  1:53 PM Full Code [id number]  Karthikeyan Hooper MD Inpatient    10/2/2013  3:34 PM 10/5/2013  4:59 PM Full Code [id number]  Kae Stacy RN Inpatient    10/30/2012 11:46 AM 10/2/2013  3:34 PM Full Code [id number]  Bibi Agrawal MD Outpatient    10/28/2012 10:15 PM 10/30/2012 11:46 AM Full Code [id number]  Mag Agrawal MD Inpatient         Medication Review      START taking        Dose / Directions Comments     amoxicillin-clavulanate 875-125 MG per tablet   Commonly known as:  AUGMENTIN   Used for:  Sepsis, due to unspecified organism (H)        Dose:  1 tablet   Take 1 tablet by mouth 2 times daily for 2 days   Quantity:  4 tablet   Refills:  0        guaiFENesin 20 mg/mL Soln solution   Commonly known as:  ROBITUSSIN   Used for:  Productive cough        Dose:  10 mL   Take 10 mLs by mouth every 6 hours as needed for cough   Quantity:  236 mL   Refills:  0          CONTINUE these medications which have NOT CHANGED        Dose / Directions Comments    acetaminophen 500 MG tablet   Commonly known as:  TYLENOL   Used for:  Chronic low back pain, unspecified back pain laterality, with sciatica presence unspecified        Dose:  1000 mg   Take 2 tablets (1,000 mg) by mouth 3 times daily as needed for mild pain   Quantity:  100 tablet   Refills:  3        albuterol 108 (90 Base) MCG/ACT inhaler   Commonly known as:  PROAIR HFA/PROVENTIL HFA/VENTOLIN HFA   Used for:  Pneumonia, organism unspecified(486)        Dose:  2 puff   Inhale 2 puffs into the lungs every 6 hours as needed for shortness of breath / dyspnea or wheezing   Quantity:  1 Inhaler   Refills:  0    Patient has no nebulizer machine until Monday 6-25-18       aspirin 81 MG EC tablet   Used for:  Coronary artery disease involving native heart with angina pectoris, unspecified vessel or lesion type (H)        TAKE 1 TABLET (81MG) BY MOUTH DAILY   Quantity:  28 tablet   Refills:  3        atorvastatin 80 MG tablet   Commonly known as:  LIPITOR   Used for:  Hyperlipidemia LDL goal <100        TAKE 1 TABLET (80MG) BY MOUTH DAILY   Quantity:  30 tablet   Refills:  11        benztropine 0.5 MG tablet   Commonly known as:  COGENTIN   Used for:  Extrapyramidal and movement disorder        Dose:  0.5 mg   Take 1 tablet (0.5 mg) by mouth 2 times daily   Quantity:  60 tablet   Refills:  2        calcium carbonate 600 mg (elemental) 1500 (600 Ca) MG tablet   Commonly known as:   OS-ALLEN   Used for:  Other osteoporosis without current pathological fracture        Dose:  1500 mg   Take 1 tablet (1,500 mg) by mouth daily   Quantity:  180 tablet   Refills:  3        dulaglutide 1.5 MG/0.5ML pen   Commonly known as:  TRULICITY   Used for:  Type 2 diabetes mellitus with mild nonproliferative retinopathy without macular edema, with long-term current use of insulin, unspecified laterality (H)        Dose:  1.5 mg   Inject 1.5 mg Subcutaneous every 7 days   Quantity:  2 mL   Refills:  3        fluticasone 50 MCG/ACT spray   Commonly known as:  FLONASE   Used for:  Seasonal allergic rhinitis, unspecified chronicity, unspecified trigger        Dose:  1-2 spray   Spray 1-2 sprays into both nostrils daily   Quantity:  1 Bottle   Refills:  11        gabapentin 300 MG capsule   Commonly known as:  NEURONTIN   Used for:  Type 2 diabetes mellitus with diabetic neuropathy, with long-term current use of insulin (H)        TAKE 2 CAPSULES (600MG) BY MOUTH THREE TIMES A DAY   Quantity:  168 capsule   Refills:  1        glucose 4 g Chew chewable tablet   Used for:  Type 2 diabetes mellitus with mild nonproliferative retinopathy without macular edema, with long-term current use of insulin, unspecified laterality (H)        Take 2 every 15 minutes for blood sugar <70mg/dL. Recheck blood sugar every 15 minutes until above 70mg/dL, then eat a substantial meal.   Quantity:  20 tablet   Refills:  1        ibuprofen 600 MG tablet   Commonly known as:  ADVIL/MOTRIN   Used for:  Closed fracture of one rib of right side, initial encounter        Dose:  600 mg   Take 1 tablet (600 mg) by mouth every 4 hours as needed for moderate pain   Quantity:  60 tablet   Refills:  0        insulin aspart 100 UNIT/ML injection   Commonly known as:  NovoLOG FLEXPEN   Used for:  Type 2 diabetes mellitus with mild nonproliferative retinopathy without macular edema, with long-term current use of insulin, unspecified laterality (H)         Dose:  20 Units   Inject 20 Units Subcutaneous 3 times daily (with meals) Once daily, can add additional 5 units if BGs are >500mg/dL.   Quantity:  15 mL   Refills:  11        insulin glargine 100 UNIT/ML injection   Commonly known as:  LANTUS   Used for:  Type 2 diabetes mellitus with mild nonproliferative retinopathy without macular edema, with long-term current use of insulin, unspecified laterality (H)        Dose:  48 Units   Inject 48 Units Subcutaneous At Bedtime   Quantity:  3 mL   Refills:  11        ipratropium - albuterol 0.5 mg/2.5 mg/3 mL 0.5-2.5 (3) MG/3ML neb solution   Commonly known as:  DUONEB   Used for:  Wheezing        Dose:  1 vial   Take 1 vial (3 mLs) by nebulization every 6 hours as needed for shortness of breath / dyspnea or wheezing   Quantity:  30 vial   Refills:  0        losartan 100 MG tablet   Commonly known as:  COZAAR   Used for:  Coronary artery disease involving native heart with angina pectoris, unspecified vessel or lesion type (H)        TAKE 1 TABLET (100MG) BY MOUTH DAILY   Quantity:  28 tablet   Refills:  3        melatonin 5 MG Caps   Used for:  Insomnia, unspecified type        Dose:  2 capsule   Take 2 capsules by mouth daily At dinnertime   Quantity:  180 capsule   Refills:  2        metoprolol succinate 25 MG 24 hr tablet   Commonly known as:  TOPROL-XL   Used for:  Coronary artery disease involving native heart with angina pectoris, unspecified vessel or lesion type (H)        Dose:  25 mg   Take 1 tablet (25 mg) by mouth daily   Quantity:  30 tablet   Refills:  1        paliperidone 9 MG 24 hr tablet   Commonly known as:  INVEGA   Used for:  Schizoaffective disorder, bipolar type (H)        Dose:  9 mg   Take 1 tablet (9 mg) by mouth every morning   Quantity:  30 tablet   Refills:  2        polyethylene glycol powder   Commonly known as:  MIRALAX/GLYCOLAX   Used for:  Constipation, unspecified constipation type        TAKE 8.5 GRAMS (1/2 CAPFUL) BY MOUTH DAILY    Quantity:  527 g   Refills:  3        ranitidine 300 MG tablet   Commonly known as:  ZANTAC   Used for:  Gastroesophageal reflux disease without esophagitis        TAKE 1 TABLET (300MG) BY MOUTH AT BEDTIME   Quantity:  90 tablet   Refills:  1        senna-docusate 8.6-50 MG per tablet   Commonly known as:  SENOKOT-S;PERICOLACE   Used for:  Drug-induced constipation        Dose:  1 tablet   Take 1 tablet by mouth 2 times daily as needed for constipation   Quantity:  100 tablet   Refills:  1        sertraline 100 MG tablet   Commonly known as:  ZOLOFT   Used for:  Schizoaffective disorder, bipolar type (H)        Dose:  200 mg   Take 2 tablets (200 mg) by mouth daily   Quantity:  60 tablet   Refills:  2        SUMAtriptan 25 MG tablet   Commonly known as:  IMITREX   Used for:  Migraine with aura and without status migrainosus, not intractable        Dose:  25-50 mg   Take 1-2 tablets (25-50 mg) by mouth at onset of headache for migraine May repeat in 2 hours. Max 8 tablets/24 hours.   Quantity:  12 tablet   Refills:  1        topiramate 25 MG tablet   Commonly known as:  TOPAMAX   Used for:  Morbid obesity (H)        Dose:  50 mg   Take 2 tablets (50 mg) by mouth 2 times daily   Quantity:  120 tablet   Refills:  3        vitamin D3 77597 UNITS capsule   Commonly known as:  CHOLECALCIFEROL   Used for:  Vitamin D deficiency        TAKE 1 CAPSULE (50,000 UNITS) MONTHLY   Quantity:  12 capsule   Refills:  1                Summary of Visit     Reason for your hospital stay       Your admitted earlier with increasing fever, generalized weakness, decreased oral intake, coughing spells and found to have high fever spikes.  Consideration for bacterial infection but with no clear-cut evidence of pneumonia or UTI were entertained and he was provided with antibiotics during her stay.             After Care     Activity       Your activity upon discharge: activity as tolerated       Diet       Follow this diet upon discharge:  Orders Placed This Encounter      Regular Diet Adult             Your next 10 appointments already scheduled     Oct 22, 2018 10:30 AM CDT   (Arrive by 10:15 AM)   CT LUMBAR SPINE W/O CONTRAST with [id number]   [facility] ([facility])    [address]  [facility] [id number] [phone]           How do I prepare for my exam? (Food and drink instructions) No Food and Drink Restrictions.  How do I prepare for my exam? (Other instructions) You do not need to do anything special to prepare for this exam. For a sinus scan: Use your nose spray (nasal decongestant spray) as directed.  What should I wear: Please wear loose clothing, such as a sweat suit or jogging clothes. Avoid snaps, zippers and other metal. We may ask you to undress and put on a hospital gown.  How long does the exam take: Most scans take less than 20 minutes.  What should I bring: Please bring any scans or X-rays taken at other hospitals, if similar tests were done. Also bring a list of your medicines, including vitamins, minerals and over-the-counter drugs. It is safest to leave personal items at home.  Do I need a : No  is needed.  What do I need to tell my doctor? Be sure to tell your doctor: * If you have any allergies. * If there s any chance you are pregnant. * If you are breastfeeding.  What should I do after the exam: No restrictions, You may resume normal activities.  What is this test: A CT (computed tomography) scan is a series of pictures that allows us to look inside your body. The scanner creates images of the body in cross sections, much like slices of bread. This helps us see any problems more clearly.  Who should I call with questions: If you have any questions, please call the Imaging Department where you will have your exam. Directions, parking instructions, and other information is available on our website, [email or web address].            Oct 22, 2018   1:00 PM CDT   Ortho Treatment with Kiko Gallego, PT   [facility] ([facility])    [address]  [facility] [address]   [phone]            Oct 29, 2018  1:00 PM CDT   Ortho Treatment with Kiko Gallego, PT   [facility] ([facility])    [address] [facility] [address]   [phone]            Nov 01, 2018  9:30 AM CDT   Return Visit with ART Arias CNP   [facility] ([facility]    [address]  [address]  [facility] [address]   [phone]            Nov 01, 2018  9:30 AM CDT   Return Visit with Richardson Lopez [facility] ([facility]    [address]  [address]  [facility] [address] [phone]            Nov 01, 2018  9:30 AM CDT   Office Visit with Eugenia De Jesus [facility] ([facility])    [address] [place] [address]  [address]  [facility] [address] [phone]           Bring a current list of meds and any records pertaining to this visit. For Physicals, please bring immunization records and any forms needing to be filled out. Please arrive 10 minutes early to complete paperwork.            Nov 30, 2018  1:30 PM CST   Return Visit with Kraig Pedersen MD   [facility] ([facility])    [address]  [facility] [address] [phone]            Mar 14, 2019  3:15 PM CDT   RETURN RETINA with Tiburcio Ch[facility]    [address]  [facility] [address]   [phone]              Follow-Up Appointment Instructions     Future Labs/Procedures    Follow-up and recommended labs and tests      Comments:    Follow  up with primary care provider, Rowena Haas, within 7 days to evaluate medication change, to evaluate treatment change and for hospital follow- up.  No follow up labs or test are needed.      Follow-Up Appointment Instructions     Follow-up and recommended labs and tests        Follow up with primary care provider, Rowena Haas, within 7 days to evaluate medication change, to evaluate treatment change and for hospital follow- up.  No follow up labs or test are needed.             Statement of Approval     Ordered          10/21/18 0952  I have reviewed and agree with all the recommendations and orders detailed in this document.  EFFECTIVE NOW     Approved and electronically signed by:  Manuelito Smith MD

## 2018-10-17 NOTE — IP AVS SNAPSHOT
` `           [place] [address] [facility]: [phone]                 INTERAGENCY TRANSFER FORM - NOTES (H&P, Discharge Summary, Consults, Procedures, Therapies)   10/17/2018                    Hospital Admission Date: 10/17/2018  NENA BURNHAM   : 1960  Sex: Female        Patient PCP Information     Provider PCP Type    ART Fay CNP General         History & Physicals      H&P by Manuelito Smith MD at 10/17/2018  8:34 PM     Author:  Manuelito Smith MD Service:  Hospitalist Author Type:  Physician    Filed:  10/17/2018  8:34 PM Date of Service:  10/17/2018  8:34 PM Creation Time:  10/17/2018  8:23 PM    Status:  Signed :  Manuelito Smith MD (Physician)         [facility]  Hospitalist Admission Note  Name: Nena Burnham    MRN: [id number]  YOB: 1960    Age: 58 year old  Date of admission: 10/17/2018  Primary care provider: Rowena Haas            Assessment and Plan:   Nena Burnham is a 58 year old female complex medical history currently living at a group home setting with known hypertension, CINDY noncompliant with appliance, CAD, dyslipidemia, prior polysubstance abuse but noted to be in remission, insulin requiring diabetes mellitus who presented earlier in the emergency room due to numerous complaints of worsening coughing spells, decrease in appetite, fever and chills over the past 2 days duration    1.  Fever, chills, respiratory symptoms with cough, shortness of breath, leukocytosis, tachycardia, lactic acidosis with suspected early sepsis likely from possible underlying pneumonia.  -Chest x-ray showing no definitive infiltrates  -Current symptomatology and above findings highly suggestive of infectious process most likely respiratory tract in etiology.  -We will continue with IV antibiotics as started in the emergency room with Rocephin and Zithromax.  -Infectious workup and cultures will be closely  monitored and followed.  Added pro-calcitonin levels.  -As needed bronchodilators.  As needed antitussive.  -As needed APAP for fever spikes.    2.  Insulin requiring diabetes mellitus-we will resume her home regimen if she continues to demonstrate tolerance of diabetic diet.    3.  CAD-resume home regimen of aspirin and statins, metoprolol continued.    4.  Hypertension-on ARB.    5.  History of migraine headaches, will resume Imitrex and Topamax    6.  History of schizoaffective disorder, depressive type and PTSD-we will resume her regimen for days once reconciled.    Code status: Full as per patient's instructions  Admit to inpatient  Prophylaxis: Mechanical  Disposition: Back to group home likely needing at least 2 inpatient hospitalization days.          Chief Complaint:   Cough, decreased appetite, fever, chills of at least 2 days duration       Source of Information:   Patient with poor to fair reliability.  Discussion with ED physician  Review of E chart records         History of Present Illness:   Nena Tang is a 58 year old female complex medical history currently living at a group home setting with known hypertension, CINDY noncompliant with appliance, CAD, dyslipidemia, prior polysubstance abuse but noted to be in remission, insulin requiring diabetes mellitus who presented earlier in the emergency room due to numerous complaints of worsening coughing spells, decrease in appetite, fever and chills over the past 2 days duration.  This was accompanied with increasing shortness of breath but denies any chest pain, palpitations, vomiting spells, diarrhea or any bleeding tendencies.  She stated that she is not aware of any other residents in the group home having similar symptomatology.  She denies any recent travel, or any recent visitors with similar illness.    It was noted that during EMS evaluation she has a decreased oxygen levels at 89% but was not requiring any oxygen supplementation upon  arrival in the emergency room and maintaining good amount of oxygen levels.  Her hemodynamics were stable.  However she was exhibiting high fevers with T-max 103 degrees of Fahrenheit.  Infectious workup ensued with no obvious etiology of her seen but has significant leukocytosis with accompanying lactic acidosis.  No definitive infiltrate seen in the chest x-ray.  She was also stating some frequency but urine analysis showed negative for any signs of infection.    Given her current fever spikes and numerous symptomatology suggestive of respiratory tract infection she was started with intravenous antibiotics and due to concerns of early sepsis she was referred to us for further management and care hence this hospitalization.    During the time of my examination she is found sitting on the hospital chair and started to feel a little better and denies any new complaints.            Past Medical History:     Past Medical History:   Diagnosis Date     CAD (coronary artery disease)     5/2014 cath, nonbostructive stenosis to LAD, RCA.     Chronic low back pain 1/22/2013     Cocaine abuse, in remission (H)      Fecal urgency 3/8/2012     History of heroin abuse      Hyperlipidemia LDL goal <100 10/31/2010     Hypertension 7/29/2013     Illiterate 8/30/2011     Irritable bowel syndrome      Left cataract      Migraine 4/19/2012     Migraine headache 4/22/2013     Moderate major depression (H) 6/8/2011     Noncompliance with medication regimen 6/8/2011     Obesity      CINDY (obstructive sleep apnea) 3/8/2012    uses CPAP     Osteopenia 10/7/2009     Schizoaffective disorder, depressive type (H) 2/25/2013     Suicidal intent 10/2/2013     Takotsubo cardiomyopathy      Type 2 diabetes mellitus (H) 8/30/2011     Uterine cancer (H) 1983     Verbal auditory hallucination 10/4/2012             Past Surgical History:     Past Surgical History:   Procedure Laterality Date     C OOPHORECTORMY FOR RAHEL, W/BX  1983    UTERINE      CATARACT IOL, RT/LT Bilateral 2017     COLONOSCOPY N/A 3/16/2017    Procedure: COLONOSCOPY;  Surgeon: Traci Gonzalez MD;  Location:  GI     Coronary CTA  5/21/2014     HYSTERECTOMY  1983    uterine cancer yearly pap's per provider.     LAPAROSCOPIC CHOLECYSTECTOMY  2008     PHACOEMULSIFICATION CLEAR CORNEA WITH STANDARD INTRAOCULAR LENS IMPLANT Left 5/5/2017    Procedure: PHACOEMULSIFICATION CLEAR CORNEA WITH STANDARD INTRAOCULAR LENS IMPLANT;  LEFT EYE PHACOEMULSIFICATION CLEAR CORNEA WITH STANDARD INTRAOCULAR LENS IMPLANT ;  Surgeon: Tyra Diaz MD;  Location:  EC     PHACOEMULSIFICATION CLEAR CORNEA WITH STANDARD INTRAOCULAR LENS IMPLANT Right 6/30/2017    Procedure: PHACOEMULSIFICATION CLEAR CORNEA WITH STANDARD INTRAOCULAR LENS IMPLANT;  RIGHT EYE PHACOEMULSIFICATION CLEAR CORNEA WITH STANDARD INTRAOCULAR LENS IMPLANT;  Surgeon: Tyra Diaz MD;  Location:  EC     RELEASE TRIGGER FINGER  10/11/2012    Left thumb. Procedure: RELEASE TRIGGER FINGER;  LEFT THUMB TRIGGER RELEASE;  Surgeon: Tay Langley MD;  Location:  SD     RELEASE TRIGGER FINGER Right 12/26/2016    Procedure: RELEASE TRIGGER FINGER;  Surgeon: Albino Castañeda MD;  Location:  OR             Social History:     Social History   Substance Use Topics     Smoking status: Never Smoker     Smokeless tobacco: Never Used     Alcohol use No      Comment: last month             Family History:   Family history was fully reviewed and non-contributory in this case.         Allergies:     Allergies   Allergen Reactions     Imidazole Antifungals Hives     Tolerates diflucan     Ketoprofen Itching     Pruritis to topical     Lisinopril Hives     Metformin Other (See Comments)     Patient hospitalized for lactic acidosis - admitting provider suspectd caused by metformin     Metronidazole Hives     Posaconazole Hives     Tolerates diflucan             Medications:     Prior to Admission medications    Medication Sig Last  Dose Taking? Auth Provider   acetaminophen (TYLENOL) 500 MG tablet Take 2 tablets (1,000 mg) by mouth 3 times daily as needed for mild pain 10/16/2018 at Unknown time Yes Rowena Haas APRN CNP   albuterol (PROAIR HFA/PROVENTIL HFA/VENTOLIN HFA) 108 (90 Base) MCG/ACT Inhaler Inhale 2 puffs into the lungs every 6 hours as needed for shortness of breath / dyspnea or wheezing  Yes Wendy Tang APRN CNP   aspirin 81 MG EC tablet TAKE 1 TABLET (81MG) BY MOUTH DAILY 10/17/2018 at Unknown time Yes Rowena Haas APRN CNP   atorvastatin (LIPITOR) 80 MG tablet TAKE 1 TABLET (80MG) BY MOUTH DAILY 10/17/2018 at Unknown time Yes Rowena Haas APRN CNP   benztropine (COGENTIN) 0.5 MG tablet Take 1 tablet (0.5 mg) by mouth 2 times daily 10/17/2018 at am Yes Kraig Pedersen MD   calcium carbonate (OS-ALLEN 600 MG [place]. CA) 1500 (600 CA) MG tablet Take 1 tablet (1,500 mg) by mouth daily 10/17/2018 at am Yes Rowena Haas APRN CNP   dulaglutide (TRULICITY) 1.5 MG/0.5ML pen Inject 1.5 mg Subcutaneous every 7 days Past Week at 10-15-18 Yes Rowena Haas APRN CNP   fluticasone (FLONASE) 50 MCG/ACT spray Spray 1-2 sprays into both nostrils daily 10/17/2018 at Unknown time Yes Adriana Georges APRN CNP   gabapentin (NEURONTIN) 300 MG capsule TAKE 2 CAPSULES (600MG) BY MOUTH THREE TIMES A DAY 10/17/2018 at Unknown time Yes Rowena Haas APRN CNP   glucose 4 G CHEW chewable tablet Take 2 every 15 minutes for blood sugar <70mg/dL. Recheck blood sugar every 15 minutes until above 70mg/dL, then eat a substantial meal.  Yes Rowena Haas APRN CNP   ibuprofen (ADVIL,MOTRIN) 600 MG tablet Take 1 tablet (600 mg) by mouth every 4 hours as needed for moderate pain  Yes Jud Real PA-C   insulin aspart (NOVOLOG FLEXPEN) 100 UNIT/ML injection Inject 20 Units Subcutaneous 3 times daily (with meals) Once daily, can add additional 5 units if BGs are >500mg/dL.  Yes Rowena Haas, ART CNP    insulin glargine (LANTUS SOLOSTAR) 100 UNIT/ML pen Inject 48 Units Subcutaneous At Bedtime 10/16/2018 at Unknown time Yes Rowena Haas APRN CNP   ipratropium - albuterol 0.5 mg/2.5 mg/3 mL (DUONEB) 0.5-2.5 (3) MG/3ML neb solution Take 1 vial (3 mLs) by nebulization every 6 hours as needed for shortness of breath / dyspnea or wheezing  Yes Adriana Georges APRN CNP   losartan (COZAAR) 100 MG tablet TAKE 1 TABLET (100MG) BY MOUTH DAILY 10/17/2018 at Unknown time Yes Rowena Haas APRN CNP   melatonin 5 MG CAPS Take 2 capsules by mouth daily At dinnertime 10/16/2018 at Unknown time Yes Kraig Pedersen MD   metoprolol succinate (TOPROL-XL) 25 MG 24 hr tablet Take 1 tablet (25 mg) by mouth daily 10/17/2018 at Unknown time Yes Rowena Haas APRN CNP   paliperidone (INVEGA) 9 MG 24 hr tablet Take 1 tablet (9 mg) by mouth every morning 10/17/2018 at Unknown time Yes Kraig Pedersen MD   polyethylene glycol (MIRALAX/GLYCOLAX) powder TAKE 8.5 GRAMS (1/2 CAPFUL) BY MOUTH DAILY 10/17/2018 at Unknown time Yes Rowena Haas APRN CNP   ranitidine (ZANTAC) 300 MG tablet TAKE 1 TABLET (300MG) BY MOUTH AT BEDTIME 10/16/2018 at Unknown time Yes Rowena Haas APRN CNP   senna-docusate (SENOKOT-S;PERICOLACE) 8.6-50 MG per tablet Take 1 tablet by mouth 2 times daily as needed for constipation  Yes Kraig Pedersen MD   sertraline (ZOLOFT) 100 MG tablet Take 2 tablets (200 mg) by mouth daily 10/17/2018 at Unknown time Yes Kraig Pedersen MD   SUMAtriptan (IMITREX) 25 MG tablet Take 1-2 tablets (25-50 mg) by mouth at onset of headache for migraine May repeat in 2 hours. Max 8 tablets/24 hours.  Yes Haas, Rowena N, APRN CNP   topiramate (TOPAMAX) 25 MG tablet Take 2 tablets (50 mg) by mouth 2 times daily 10/17/2018 at am Yes Debbie Germain PA-C   vitamin D3 (CHOLECALCIFEROL) 47202 UNITS capsule TAKE 1 CAPSULE (50,000 UNITS) MONTHLY Past Week at 10-15-18 Yes Rowena Haas, APRN CNP             Review of  Systems:   A Comprehensive greater than 10 system review of systems was carried out.  Pertinent positives and negatives are noted above.  Otherwise negative for contributory information.           Physical Exam:   Blood pressure 151/80, pulse 104, temperature 102.8  F (39.3  C), resp. rate 20, weight 99.8 kg (220 lb), last menstrual period 01/06/2015, SpO2 93 %.  Wt Readings from Last 1 Encounters:   10/17/18 99.8 kg (220 lb)     Exam:  GENERAL: No apparent distress. Awake, alert, and fully oriented, obese  HEENT: Normocephalic, atraumatic. Extraocular movements intact.  CARDIOVASCULAR: Tachycardic rate at 106 bpm and rhythm without murmurs or rubs. No JVD  PULMONARY: Fair air entry, no wheezes, mild rhonchi, no stridor, no crackles  ABDOMINAL: Soft, non-tender, non-distended. Bowel sounds normoactive. No hepatosplenomegaly.  EXTREMITIES: No cyanosis or clubbing. Nonpitting edema both lower extremities  NEUROLOGICAL: CN 2-12 grossly intact, awake and alert x3, spontaneous and coherent speech. no focal neurological deficits.  DERMATOLOGICAL: No rash, ulcer, ecchymoses, jaundice.  Psych: not agitation, not combative, pleasant mood  Lymph nodes: no obvious palpable  cervical or axillary lymphadenopathy         Data:   EKG:    No new EKG seen in epic  Imaging:[AG1.1]  Results for orders placed or performed during the hospital encounter of 10/17/18   XR Chest 2 Views    Narrative    CHEST TWO VIEWS 10/17/2018 4:30 PM     HISTORY: Fever.       Impression    IMPRESSION: Allowing for suboptimal inspiration, the lungs appear  grossly clear. There may be mild vascular congestion in the upper  lobes. However, no pleural effusions are demonstrated.    ALONSO MONTEIRO MD     *Note: Due to a large number of results and/or encounters for the requested time period, some results have not been displayed. A complete set of results can be found in Results Review.[AG1.2]       Labs:[AG1.1]  No results for input(s): CULT in the last 168  hours.    Recent Labs  Lab 10/17/18  1531   WBC 12.1*   HGB 10.5*   HCT 33.2*   MCV 98          Recent Labs  Lab 10/17/18  1531      POTASSIUM 4.3   CHLORIDE 106   CO2 26   ANIONGAP 5   *   BUN 15   CR 1.04   GFRESTIMATED 54*   GFRESTBLACK 66   ALLEN 8.9   PROTTOTAL 8.0   ALBUMIN 3.7   BILITOTAL 0.2   ALKPHOS 95   AST 17   ALT 23     No results for input(s): SED, CRP in the last 168 hours.    Recent Labs  Lab 10/17/18  1531 10/17/18  1508   *  --    BGM  --  172*     No results for input(s): INR in the last 168 hours.  No results for input(s): TROPONIN, TROPI, TROPR in the last 168 hours.    Invalid input(s): TROP, TROPONINIES    Recent Labs  Lab 10/17/18  1805   COLOR Yellow   APPEARANCE Clear   URINEGLC Negative   URINEBILI Negative   URINEKETONE Negative   SG 1.016   UBLD Negative   URINEPH 6.0   PROTEIN Negative   NITRITE Negative   LEUKEST Negative   RBCU 0   WBCU <1[AG1.2]          Revision History        User Key Date/Time User Provider Type Action    > AG1.2 10/17/2018  8:34 PM Manuelito Smith MD Physician Sign     AG1.1 10/17/2018  8:23 PM Manuelito Smith MD Physician                      Discharge Summaries      Discharge Summaries by Manuelito Smith MD at 10/21/2018  9:56 AM     Author:  Manuelito Smith MD Service:  Hospitalist Author Type:  Physician    Filed:  10/21/2018  9:56 AM Date of Service:  10/21/2018  9:56 AM Creation Time:  10/21/2018  9:52 AM    Status:  Signed :  Manuelito Smith MD (Physician)         [facility]  Discharge Summary  Name: Nena Tang    MRN: [id number]  YOB: 1960    Age: 58 year old  Date of Discharge:  10/21/2018  Date of Admission: 10/17/2018  Primary Care Provider: Rowena Haas  Discharge Physician:  Manuelito Smith MD  Discharging Service:  Hospitalist      Discharge Diagnosis:  Fever, chills, respiratory symptoms with cough, shortness of breath, leukocytosis, tachycardia,  lactic acidosis with suspected early sepsis likely from possible underlying pneumonia  Insulin requiring diabetes mellitus  History of CAD  History of schizoaffective disorder  Hypertension       Other Diagnosis:[AG1.1]  Past Medical History:   Diagnosis Date     CAD (coronary artery disease)     5/2014 cath, nonbostructive stenosis to LAD, RCA.     Chronic low back pain 1/22/2013     Cocaine abuse, in remission (H)      Fecal urgency 3/8/2012     History of heroin abuse      Hyperlipidemia LDL goal <100 10/31/2010     Hypertension 7/29/2013     Illiterate 8/30/2011     Irritable bowel syndrome      Left cataract      Migraine 4/19/2012     Migraine headache 4/22/2013     Moderate major depression (H) 6/8/2011     Noncompliance with medication regimen 6/8/2011     Obesity      CINDY (obstructive sleep apnea) 3/8/2012    uses CPAP     Osteopenia 10/7/2009     Schizoaffective disorder, depressive type (H) 2/25/2013     Suicidal intent 10/2/2013     Takotsubo cardiomyopathy      Type 2 diabetes mellitus (H) 8/30/2011     Uterine cancer (H) 1983     Verbal auditory hallucination 10/4/2012[AG1.2]          Discharge Disposition:  Discharged to group home     Allergies:  Allergies   Allergen Reactions     Imidazole Antifungals Hives     Tolerates diflucan     Ketoprofen Itching     Pruritis to topical     Lisinopril Hives     Metformin Other (See Comments)     Patient hospitalized for lactic acidosis - admitting provider suspectd caused by metformin     Metronidazole Hives     Posaconazole Hives     Tolerates diflucan        Discharge Medications:   Current Discharge Medication List      START taking these medications    Details   amoxicillin-clavulanate (AUGMENTIN) 875-125 MG per tablet Take 1 tablet by mouth 2 times daily for 2 days  Qty: 4 tablet, Refills: 0    Associated Diagnoses: Sepsis, due to unspecified organism (H)      guaiFENesin (ROBITUSSIN) 20 mg/mL SOLN solution Take 10 mLs by mouth every 6 hours as needed for  cough  Qty: 236 mL, Refills: 0    Associated Diagnoses: Productive cough         CONTINUE these medications which have NOT CHANGED    Details   acetaminophen (TYLENOL) 500 MG tablet Take 2 tablets (1,000 mg) by mouth 3 times daily as needed for mild pain  Qty: 100 tablet, Refills: 3    Associated Diagnoses: Chronic low back pain, unspecified back pain laterality, with sciatica presence unspecified      albuterol (PROAIR HFA/PROVENTIL HFA/VENTOLIN HFA) 108 (90 Base) MCG/ACT Inhaler Inhale 2 puffs into the lungs every 6 hours as needed for shortness of breath / dyspnea or wheezing  Qty: 1 Inhaler, Refills: 0    Comments: Patient has no nebulizer machine until Monday 6-25-18  Associated Diagnoses: Pneumonia, organism unspecified(486)      aspirin 81 MG EC tablet TAKE 1 TABLET (81MG) BY MOUTH DAILY  Qty: 28 tablet, Refills: 3    Associated Diagnoses: Coronary artery disease involving native heart with angina pectoris, unspecified vessel or lesion type (H)      atorvastatin (LIPITOR) 80 MG tablet TAKE 1 TABLET (80MG) BY MOUTH DAILY  Qty: 30 tablet, Refills: 11    Associated Diagnoses: Hyperlipidemia LDL goal <100      benztropine (COGENTIN) 0.5 MG tablet Take 1 tablet (0.5 mg) by mouth 2 times daily  Qty: 60 tablet, Refills: 2    Associated Diagnoses: Extrapyramidal and movement disorder      calcium carbonate (OS-ALLEN 600 MG [place]. CA) 1500 (600 CA) MG tablet Take 1 tablet (1,500 mg) by mouth daily  Qty: 180 tablet, Refills: 3    Associated Diagnoses: Other osteoporosis without current pathological fracture      dulaglutide (TRULICITY) 1.5 MG/0.5ML pen Inject 1.5 mg Subcutaneous every 7 days  Qty: 2 mL, Refills: 3    Associated Diagnoses: Type 2 diabetes mellitus with mild nonproliferative retinopathy without macular edema, with long-term current use of insulin, unspecified laterality (H)      fluticasone (FLONASE) 50 MCG/ACT spray Spray 1-2 sprays into both nostrils daily  Qty: 1 Bottle, Refills: 11    Associated  Diagnoses: Seasonal allergic rhinitis, unspecified chronicity, unspecified trigger      gabapentin (NEURONTIN) 300 MG capsule TAKE 2 CAPSULES (600MG) BY MOUTH THREE TIMES A DAY  Qty: 168 capsule, Refills: 1    Associated Diagnoses: Type 2 diabetes mellitus with diabetic neuropathy, with long-term current use of insulin (H)      glucose 4 G CHEW chewable tablet Take 2 every 15 minutes for blood sugar <70mg/dL. Recheck blood sugar every 15 minutes until above 70mg/dL, then eat a substantial meal.  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, with long-term current use of insulin, unspecified laterality (H)      insulin glargine (LANTUS SOLOSTAR) 100 UNIT/ML pen Inject 48 Units Subcutaneous At Bedtime  Qty: 3 mL, Refills: 11    Associated Diagnoses: Type 2 diabetes mellitus with mild nonproliferative retinopathy without macular edema, with long-term current use of insulin, unspecified laterality (H)      ipratropium - albuterol 0.5 mg/2.5 mg/3 mL (DUONEB) 0.5-2.5 (3) MG/3ML neb solution Take 1 vial (3 mLs) by nebulization every 6 hours as needed for shortness of breath / dyspnea or wheezing  Qty: 30 vial, Refills: 0    Associated Diagnoses: Wheezing      losartan (COZAAR) 100 MG tablet TAKE 1 TABLET (100MG) BY MOUTH DAILY  Qty:  28 tablet, Refills: 3    Associated Diagnoses: Coronary artery disease involving native heart with angina pectoris, unspecified vessel or lesion type (H)      melatonin 5 MG CAPS Take 2 capsules by mouth daily At dinnertime  Qty: 180 capsule, Refills: 2    Associated Diagnoses: Insomnia, unspecified type      metoprolol succinate (TOPROL-XL) 25 MG 24 hr tablet Take 1 tablet (25 mg) by mouth daily  Qty: 30 tablet, Refills: 1    Associated Diagnoses: Coronary artery disease involving native heart with angina pectoris, unspecified vessel or lesion type (H)      paliperidone (INVEGA) 9 MG 24 hr tablet Take 1 tablet (9 mg) by mouth every morning  Qty: 30 tablet, Refills: 2    Associated Diagnoses: Schizoaffective disorder, bipolar type (H)      polyethylene glycol (MIRALAX/GLYCOLAX) powder TAKE 8.5 GRAMS (1/2 CAPFUL) BY MOUTH DAILY  Qty: 527 g, Refills: 3    Associated Diagnoses: Constipation, unspecified constipation type      ranitidine (ZANTAC) 300 MG tablet TAKE 1 TABLET (300MG) BY MOUTH AT BEDTIME  Qty: 90 tablet, Refills: 1    Associated Diagnoses: Gastroesophageal reflux disease without esophagitis      senna-docusate (SENOKOT-S;PERICOLACE) 8.6-50 MG per tablet Take 1 tablet by mouth 2 times daily as needed for constipation  Qty: 100 tablet, Refills: 1    Associated Diagnoses: Drug-induced constipation      sertraline (ZOLOFT) 100 MG tablet Take 2 tablets (200 mg) by mouth daily  Qty: 60 tablet, Refills: 2    Associated Diagnoses: Schizoaffective disorder, bipolar type (H)      SUMAtriptan (IMITREX) 25 MG tablet Take 1-2 tablets (25-50 mg) by mouth at onset of headache for migraine May repeat in 2 hours. Max 8 tablets/24 hours.  Qty: 12 tablet, Refills: 1    Associated Diagnoses: Migraine with aura and without status migrainosus, not intractable      topiramate (TOPAMAX) 25 MG tablet Take 2 tablets (50 mg) by mouth 2 times daily  Qty: 120 tablet, Refills: 3    Associated Diagnoses: Morbid obesity (H)      vitamin  D3 (CHOLECALCIFEROL) 02196 UNITS capsule TAKE 1 CAPSULE (50,000 UNITS) MONTHLY  Qty: 12 capsule, Refills: 1    Associated Diagnoses: Vitamin D deficiency              Condition on Discharge:  Discharge condition: Stable   Discharge vitals: Blood pressure 159/75, pulse 74, temperature 97.1  F (36.2  C), temperature source Oral, resp. rate 16, height 1.524 m (5'), weight 108.9 kg (240 lb), last menstrual period 01/06/2015, SpO2 96 %.   Code status on discharge: Full Code     History of Present Illness:  See detailed admission note for full details.        Significant Physical Exam Findings Day of Discharge:  HEENT; Atraumatic, normocephalic, pinkish conjuctiva, pupils bilateral reactive   Skin: warm and moist, no rashes  Lungs: equal chest expansion, clear to auscultation, no wheezes, no stridor, no crackles,   Heart: normal rate, normal rhythm, no rubs or gallops.   Abdomen: normal bowel sounds, no tenderness, no peritoneal signs, no guarding  Extremities: no deformities, no edema   Neuro; follow commands, alert and oriented x3, spontaneous speech, coherent, moves all extremities spontaneously  Psych; no hallucination, euthymic mood, not agitated        Procedures other than Imaging:  None     Imaging:[AG1.1]  No results found for this or any previous visit (from the past 48 hour(s)).[AG1.2]     Consultations:  No consultations were requested during this admission.     Recent Lab Results:    Recent Labs  Lab 10/19/18  0743 10/18/18  0659 10/17/18  1531   WBC 10.7 17.6* 12.1*   HGB 8.3* 9.0* 10.5*   HCT 26.4* 28.1* 33.2*   MCV 99 97 98    180 212       Recent Labs  Lab 10/17/18  1805 10/17/18  1619 10/17/18  1530   CULT No growth No growth after 3 days No growth after 3 days       Recent Labs  Lab 10/19/18  0743 10/18/18  0659 10/17/18  1531    137 137   POTASSIUM 3.9 3.6 4.3   CHLORIDE 113* 108 106   CO2 20 21 26   ANIONGAP 6 8 5   GLC 84 99 173*   BUN 12 15 15   CR 0.89 1.09* 1.04   GFRESTIMATED 66 52*  54*   GFRESTBLACK 79 62 66   ALLEN 8.0* 8.3* 8.9   MAG  --  1.7  --    PROTTOTAL  --   --  8.0   ALBUMIN  --   --  3.7   BILITOTAL  --   --  0.2   ALKPHOS  --   --  95   AST  --   --  17   ALT  --   --  23       Recent Labs  Lab 10/21/18  0837 10/21/18  0200 10/20/18  2118 10/20/18  1736 10/20/18  1252  10/19/18  0743  10/18/18  0659  10/17/18  1531   GLC  --   --   --   --   --   --  84  --  99  --  173*   * 151* 173* 104* 89  < >  --   < >  --   < >  --    < > = values in this interval not displayed.    Recent Labs  Lab 10/17/18  1835 10/17/18  1531 10/17/18  1530   LACT 1.4 2.1* 2.0     No results for input(s): TROPONIN, TROPI, TROPR in the last 168 hours.    Invalid input(s): TROP, TROPONINIES    Recent Labs  Lab 10/17/18  1805   COLOR Yellow   APPEARANCE Clear   URINEGLC Negative   URINEBILI Negative   URINEKETONE Negative   SG 1.016   UBLD Negative   URINEPH 6.0   PROTEIN Negative   NITRITE Negative   LEUKEST Negative   RBCU 0   WBCU <1          Pending Results:    Unresulted Labs Ordered in the Past 30 Days of this Admission     Date and Time Order Name Status Description    10/17/2018 1525 Blood culture Preliminary     10/17/2018 1521 Blood culture Preliminary            Discharge Instructions and Follow-Up:   Discharge diet:   Active Diet Order      Regular Diet Adult      Diet   Discharge activity: Activity as tolerated   Discharge follow-up: 1-2 weeks with PCP   Outpatient therapy: None    Other instructions: None      Hospital Course:  Continuing care for this 58-year-old very pleasant -American lady was currently residing in a group home setting with a background history significant for diabetes mellitus insulin requiring, schizoaffective disorder, CAD and hypertension who put initially presented with several days symptomatology of intermittent fever, coughing spells, shortness of breath, lack of appetite and worsening generalized weakness.  Upon initial presentation she is found with  leukocytosis, tachycardia, coughing spells, fever spikes with T-max of almost 103  F.  Subsequent diagnostics testing were pursued with no obvious infiltrate seen in the chest x-ray, urine analysis were negative, pro calcitonin was also low but has an accompanying lactic acidosis upon presentation.  He was treated as such with early sepsis with suspicion of bacterial in etiology.  Cultures did not show any significant growth.  She tolerated IV Rocephin and Zithromax during her stay and had significant improvement with her overall symptomatology.  Her leukocytosis has resolved.  No further recurrence of fever spikes.  Coughing spells has been resolving and currently tolerating oral diet with much improved oral intake with no complaints of nausea, vomiting or diarrhea.  We will continue with short course of oral antibiotics in the next 2 more days just to finish a 5-day course.  Her diabetes regimen will resume together with her home regimen for hypertension, and prior known CAD.       Total time spent in face to face contact with the patient and coordinating discharge was:  > 30 Minutes.[AG1.1]       Revision History        User Key Date/Time User Provider Type Action    > AG1.2 10/21/2018  9:56 AM Manuelito Smith MD Physician Sign     AG1.1 10/21/2018  9:52 AM Manuelito Smith MD Physician                      Consult Notes      Consults by White, Corinne C, LSW at 10/18/2018 10:03 AM     Author:  White, Corinne C, LSW Service:  (none) Author Type:      Filed:  10/18/2018 10:03 AM Date of Service:  10/18/2018 10:03 AM Creation Time:  10/18/2018  9:56 AM    Status:  Signed :  White, Corinne C, LSW ()     Consult Orders:    1. Social Work IP Consult [[id number]] ordered by Manuelito Smith MD at 10/18/18 0995                Care Transition Initial Assessment -   Reason For Consult: discharge planning  Met with: Patient    Active Problems:    Sepsis (H)       DATA  Lives  With: facility resident  Living Arrangements: group [place]- [facility]   Description of Support System: Supportive, Involved  Who is your support system?: Children, Facility resident(s)/Staff  Support Assessment: Adequate family and caregiver support, Adequate social supports. PT has support from her staff, she has an adult son, an ARMS worker she sees every two week. MH casemgr through [facility] and a [facility] worker, although pt can not remember her name   Identified issues/concerns regarding health management:   PT admitted with Sepsis due to possible PNA. Pt has H.O  MH issues that are stable at this time.          Other Resources: [facility] Worker  [facility] met[facility]ger. Gisselle JIN/ Zheng  [phone] or [phone]   Uses [facility]    Quality Of Family Relationships: supportive    ASSESSMENT  Cognitive Status:  awake, alert and oriented  Concerns to be addressed: Met with pt who was calling her primary care MD to cancel her appt. Pt Uses a cane for mobility. She attends a drop in center daily and like where she lives.   PT has no concerns about return to home  Called  and left Vm message to identify if they have any concerns and to confirm if they can provide transport home once pt is able       PLAN  Following. Left VM message for Zheng [phone][CW1.1]       Revision History        User Key Date/Time User Provider Type Action    > CW1.1 10/18/2018 10:03 AM White, Corinne C, LSW  Sign                     Progress Notes - Physician (Notes from 10/18/18 through 10/21/18)      Progress Notes by Chely Leslie at 10/21/2018 10:11 AM     Author:  Chely Leslie Service:  (none) Author Type:      Filed:  10/21/2018 11:45 AM Date of Service:  10/21/2018 10:11 AM Creation Time:  10/21/2018 10:11 AM    Status:  Addendum :  Chely Leslie ()         Discharge Planner   Discharge Plans in progress: Pt's bedside nurse just informed me that pt is ready to discharge.   I attempted to reach someone at the  [phone] however only could LVM requesting a call back.    Barriers to discharge plan: NA  Follow up plan: waiting to hear back from someone at , per note  can provide transportation        Entered by: Chely Leslie 10/21/2018 10:11 AM[JD1.1]         Addendum:    I CONTACTED  AGAIN AND WAS ABLE TO REACH SOMEONE.  THE SOONEST ANYONE CAN GET HERE WOULD BE A COUPLE HRS.  I HAVE UPDATED BEDSIDE RN.  MEDS WERE FILLED HERE AND discharge ORDERS WILL BE SENT WITH STAFF OF [JD1.2]             Revision History        User Key Date/Time User Provider Type Action    > JD1.2 10/21/2018 11:45 AM Chely Leslie  Addend     JD1.1 10/21/2018 10:13 AM Chely Leslie  Sign            Progress Notes by Manuelito Smith MD at 10/20/2018 10:59 AM     Author:  Manuelito Smith MD Service:  Hospitalist Author Type:  Physician    Filed:  10/20/2018 11:02 AM Date of Service:  10/20/2018 10:59 AM Creation Time:  10/20/2018 10:59 AM    Status:  Signed :  Manuelito Smith MD (Physician)         [facility]  Hospitalist Progress Note[AG1.1]  Manuelito Smith MD, MD 10/20/2018[AG1.2]  (Text Page)  Reason for Stay (Diagnosis): Fever, cough, nasal congestion, shortness of breath         Assessment and Plan:      Nena Tang is a 58 year old female complex medical history currently living at a group home setting with known hypertension, CINDY noncompliant with appliance, CAD, dyslipidemia, prior polysubstance abuse but noted to be in remission, insulin requiring diabetes mellitus who presented earlier in the emergency room due to numerous complaints of worsening coughing spells, decrease in appetite, fever and chills over the past 2 days duration     1.  Fever, chills, respiratory symptoms with cough, shortness of breath, leukocytosis, tachycardia, lactic acidosis with suspected early sepsis likely from possible underlying  pneumonia.  -Chest x-ray showing no definitive infiltrates  -Current symptomatology and above findings highly suggestive of infectious process most likely respiratory tract in etiology.  -We will continue with IV antibiotics as started in the emergency room with Rocephin and Zithromax.  Pro-calcitonin though is low.  But had significant leukocytosis earlier and now has been decreasing and back to normal levels.  -Infectious workup and cultures will be closely monitored and followed.  Blood cultures remain no growth up-to-date.  -As needed bronchodilators.  As needed antitussive.  -As needed APAP for fever spikes.     2.  Insulin requiring diabetes mellitus-we will resume her home regimen if she continues to demonstrate tolerance of diabetic diet.  -Decrease her basal insulin and prandial short acting insulin due to decreased oral intake.  -Continue current regimen as glucose levels has been stable.  Will increase back to her home regimen dosing once with better oral intake and serum glucose levels are increasing.  -She still has decrease in appetite but no episodes of hypoglycemia.  Remain on her insulin regimen.  We will just change her diet to regular diet to see if this will spike her interest with her oral intake.     3.  CAD-resume home regimen of aspirin and statins, metoprolol continued.     4.  Hypertension-on ARB.     5.  History of migraine headaches, will resume Imitrex and Topamax     6.  History of schizoaffective disorder, depressive type and PTSD-home regimen for this has been resumed.     Code status: Full as per patient's instructions  Admit to inpatient  Prophylaxis: Mechanical  Disposition: Anticipating back to her group home in the next 24 hours.            Interval History (Subjective):      Continuing care today.  Seen and examined.  Chart reviewed.  Case discussed with nursing care.  Nena is found sleeping, comfortably, but easily aroused with verbal stimuli.  She is awake, conversant  during exam.  She stated that her coughing spells has been improving in terms of intensity and frequency.  She feels a whole lot better but still has lack of appetite but denies any abdominal symptoms of nausea, vomiting or abdominal pain.  No reported diarrhea had a bowel movement last night with normal-appearing stools.    No reported mental status changes.  Currently afebrile and not requiring any oxygen supplementation.  .           Physical Exam:      Last Vital Signs:[AG1.1]  /62 (BP Location: Right arm)  Pulse 74  Temp 97.4  F (36.3  C) (Oral)  Resp 16  Ht 1.524 m (5')  Wt 108.9 kg (240 lb)  LMP 01/06/2015  SpO2 93%  BMI 46.87 kg/m2    I/O last 3 completed shifts:  In: 1906 [P.O.:1030; I.V.:876]  Out: -   Wt Readings from Last 1 Encounters:   10/17/18 108.9 kg (240 lb)     Vitals:    10/17/18 1513 10/17/18 2136   Weight: 99.8 kg (220 lb) 108.9 kg (240 lb)[AG1.2]       Constitutional: Awake, cooperative, no apparent distress   Respiratory:  Fair air entry, earlier rhonchi has resolved, no wheezes no crackles   Cardiovascular: Regular rate and rhythm, normal S1 and S2, and no murmur noted   Abdomen: Normal bowel sounds, soft, non-distended, non-tender   Skin: No rashes, no cyanosis, dry to touch   Neuro: Alert and oriented x3, no weakness, slow and soft, spontaneous and coherent speech   Extremities: No edema, normal range of motion   Other(s):  Depressed mood, not agitated       All other systems: Negative          Medications:      All current medications were reviewed with changes reflected in problem list.         Data:      All new lab and imaging data was reviewed.   Labs:[AG1.1]    Recent Labs  Lab 10/17/18  1805 10/17/18  1619 10/17/18  1530   CULT No growth No growth after 3 days No growth after 3 days       Recent Labs  Lab 10/19/18  0743 10/18/18  0659 10/17/18  1531   WBC 10.7 17.6* 12.1*   HGB 8.3* 9.0* 10.5*   HCT 26.4* 28.1* 33.2*   MCV 99 97 98    180 212       Recent  Labs  Lab 10/19/18  0743 10/18/18  0659 10/17/18  1531    137 137   POTASSIUM 3.9 3.6 4.3   CHLORIDE 113* 108 106   CO2 20 21 26   ANIONGAP 6 8 5   GLC 84 99 173*   BUN 12 15 15   CR 0.89 1.09* 1.04   GFRESTIMATED 66 52* 54*   GFRESTBLACK 79 62 66   ALLEN 8.0* 8.3* 8.9   MAG  --  1.7  --    PROTTOTAL  --   --  8.0   ALBUMIN  --   --  3.7   BILITOTAL  --   --  0.2   ALKPHOS  --   --  95   AST  --   --  17   ALT  --   --  23     No results for input(s): SED, CRP in the last 168 hours.    Recent Labs  Lab 10/20/18  0901 10/20/18  0222 10/19/18  2058 10/19/18  1722 10/19/18  1055  10/19/18  0743  10/18/18  0659  10/17/18  1531   GLC  --   --   --   --   --   --  84  --  99  --  173*   BGM 90 101* 192* 92 109*  < >  --   < >  --   < >  --    < > = values in this interval not displayed.  No results for input(s): INR in the last 168 hours.    Recent Labs  Lab 10/17/18  1805   COLOR Yellow   APPEARANCE Clear   URINEGLC Negative   URINEBILI Negative   URINEKETONE Negative   SG 1.016   UBLD Negative   URINEPH 6.0   PROTEIN Negative   NITRITE Negative   LEUKEST Negative   RBCU 0   WBCU <1[AG1.2]      Imaging:   Results for orders placed or performed during the hospital encounter of 10/17/18   XR Chest 2 Views    Narrative    CHEST TWO VIEWS 10/17/2018 4:30 PM     HISTORY: Fever.       Impression    IMPRESSION: Allowing for suboptimal inspiration, the lungs appear  grossly clear. There may be mild vascular congestion in the upper  lobes. However, no pleural effusions are demonstrated.    ALONSO MONTEIRO MD     *Note: Due to a large number of results and/or encounters for the requested time period, some results have not been displayed. A complete set of results can be found in Results Review.[AG1.1]          Revision History        User Key Date/Time User Provider Type Action    > AG1.2 10/20/2018 11:02 AM Manuelito Smith MD Physician Sign     AG1.1 10/20/2018 10:59 AM Manuelito Smith MD Physician              Progress Notes by White, Corinne C, LSW at 10/19/2018  1:12 PM     Author:  White, Corinne C, LSW Service:  (none) Author Type:      Filed:  10/19/2018  1:14 PM Date of Service:  10/19/2018  1:12 PM Creation Time:  10/19/2018  1:12 PM    Status:  Signed :  White, Corinne C, LSW ()         Discharge Planner   Discharge Plans in progress: Spoke with Zheng from [facility]. They would be able to take pt back on Sunday [phone]  Barriers to discharge plan: one. IF new meds please fill med here if weekend discharge.   Follow up plan: Staff can  discharge packet when they pick pt up.        Entered by: Corinne C. White 10/19/2018 1:12 PM[CW1.1]          Revision History        User Key Date/Time User Provider Type Action    > CW1.1 10/19/2018  1:14 PM White, Corinne C, LSW  Sign            Progress Notes by Manuelito Smith MD at 10/19/2018 10:20 AM     Author:  Manuelito Smith MD Service:  Hospitalist Author Type:  Physician    Filed:  10/19/2018 10:23 AM Date of Service:  10/19/2018 10:20 AM Creation Time:  10/19/2018 10:20 AM    Status:  Signed :  Manuelito Smith MD (Physician)         [facility]  Hospitalist Progress Note  Manuelito Smith MD, MD 10/19/2018  (Text Page)  Reason for Stay (Diagnosis): Fever, cough, nasal congestion, shortness of breath         Assessment and Plan:      Nena Tang is a 58 year old female complex medical history currently living at a group home setting with known hypertension, CINDY noncompliant with appliance, CAD, dyslipidemia, prior polysubstance abuse but noted to be in remission, insulin requiring diabetes mellitus who presented earlier in the emergency room due to numerous complaints of worsening coughing spells, decrease in appetite, fever and chills over the past 2 days duration     1.  Fever, chills, respiratory symptoms with cough, shortness of breath, leukocytosis, tachycardia,  lactic acidosis with suspected early sepsis likely from possible underlying pneumonia.  -Chest x-ray showing no definitive infiltrates  -Current symptomatology and above findings highly suggestive of infectious process most likely respiratory tract in etiology.  -We will continue with IV antibiotics as started in the emergency room with Rocephin and Zithromax.  Pro-calcitonin though is low.  But had significant leukocytosis earlier and now has been decreasing and back to normal levels.  -Infectious workup and cultures will be closely monitored and followed.  Blood cultures remain no growth up-to-date.  -As needed bronchodilators.  As needed antitussive.  -As needed APAP for fever spikes.     2.  Insulin requiring diabetes mellitus-we will resume her home regimen if she continues to demonstrate tolerance of diabetic diet.  -Decrease her basal insulin and prandial short acting insulin due to decreased oral intake.  -Continue current regimen as glucose levels has been stable.  Will increase back to her home regimen dosing once with better oral intake and serum glucose levels are increasing.     3.  CAD-resume home regimen of aspirin and statins, metoprolol continued.     4.  Hypertension-on ARB.     5.  History of migraine headaches, will resume Imitrex and Topamax     6.  History of schizoaffective disorder, depressive type and PTSD-home regimen for this has been resumed.     Code status: Full as per patient's instructions  Admit to inpatient  Prophylaxis: Mechanical  Disposition: Anticipating back to her group home in the next 1-2 days.            Interval History (Subjective):      Continuing care today.  Seen and examined.  Chart reviewed.  Case discussed with nursing care.  She is feeling better, more conversant and cooperative.  She appears to be more interactive and in better spirits this morning.  Reportedly tolerating a little better oral intake.  No reported nausea or vomiting.  No diarrhea today.  Remain  afebrile.  Coughing spells has been decreasing in frequency and intensity.  Remain afebrile this morning.  Hemodynamics are stable.  Not requiring any oxygen supplementation.           Physical Exam:      Last Vital Signs:  /52 (BP Location: Right arm)  Pulse 74  Temp 98  F (36.7  C) (Oral)  Resp 16  Ht 1.524 m (5')  Wt 108.9 kg (240 lb)  LMP 01/06/2015  SpO2 91%  BMI 46.87 kg/m2    I/O last 3 completed shifts:  In: 590 [P.O.:590]  Out: 1475 [Urine:1475]  Wt Readings from Last 1 Encounters:   10/17/18 108.9 kg (240 lb)     Vitals:    10/17/18 1513 10/17/18 2136   Weight: 99.8 kg (220 lb) 108.9 kg (240 lb)       Constitutional: Awake, cooperative, no apparent distress   Respiratory:  Fair air entry, bilateral mild rhonchi, no wheezes   Cardiovascular: Regular rate and rhythm, normal S1 and S2, and no murmur noted   Abdomen: Normal bowel sounds, soft, non-distended, non-tender   Skin: No rashes, no cyanosis, dry to touch   Neuro: Alert and oriented x3, no weakness, slow and soft, spontaneous and coherent speech   Extremities: No edema, normal range of motion   Other(s):  Depressed mood, not agitated       All other systems: Negative          Medications:      All current medications were reviewed with changes reflected in problem list.         Data:      All new lab and imaging data was reviewed.   Labs:    Recent Labs  Lab 10/17/18  1805 10/17/18  1619 10/17/18  1530   CULT No growth No growth after 2 days No growth after 2 days       Recent Labs  Lab 10/19/18  0743 10/18/18  0659 10/17/18  1531   WBC 10.7 17.6* 12.1*   HGB 8.3* 9.0* 10.5*   HCT 26.4* 28.1* 33.2*   MCV 99 97 98    180 212       Recent Labs  Lab 10/19/18  0743 10/18/18  0659 10/17/18  1531    137 137   POTASSIUM 3.9 3.6 4.3   CHLORIDE 113* 108 106   CO2 20 21 26   ANIONGAP 6 8 5   GLC 84 99 173*   BUN 12 15 15   CR 0.89 1.09* 1.04   GFRESTIMATED 66 52* 54*   GFRESTBLACK 79 62 66   ALLEN 8.0* 8.3* 8.9   MAG  --  1.7  --     PROTTOTAL  --   --  8.0   ALBUMIN  --   --  3.7   BILITOTAL  --   --  0.2   ALKPHOS  --   --  95   AST  --   --  17   ALT  --   --  23     No results for input(s): SED, CRP in the last 168 hours.    Recent Labs  Lab 10/19/18  0803 10/19/18  0743 10/19/18  0223 10/18/18  2108 10/18/18  1722 10/18/18  0902 10/18/18  0659  10/17/18  1531   GLC  --  84  --   --   --   --  99  --  173*   BGM 84  --  107* 229* 103* 92  --   < >  --    < > = values in this interval not displayed.  No results for input(s): INR in the last 168 hours.    Recent Labs  Lab 10/17/18  1805   COLOR Yellow   APPEARANCE Clear   URINEGLC Negative   URINEBILI Negative   URINEKETONE Negative   SG 1.016   UBLD Negative   URINEPH 6.0   PROTEIN Negative   NITRITE Negative   LEUKEST Negative   RBCU 0   WBCU <1      Imaging:   Results for orders placed or performed during the hospital encounter of 10/17/18   XR Chest 2 Views    Narrative    CHEST TWO VIEWS 10/17/2018 4:30 PM     HISTORY: Fever.       Impression    IMPRESSION: Allowing for suboptimal inspiration, the lungs appear  grossly clear. There may be mild vascular congestion in the upper  lobes. However, no pleural effusions are demonstrated.    ALONSO MONTEIRO MD     *Note: Due to a large number of results and/or encounters for the requested time period, some results have not been displayed. A complete set of results can be found in Results Review.[AG1.1]          Revision History        User Key Date/Time User Provider Type Action    > AG1.1 10/19/2018 10:23 AM Manuelito Smith MD Physician Sign            Progress Notes by Jolie Antoine RN at 10/19/2018 12:00 AM     Author:  Jolie Antoine RN Service:  (none) Author Type:  Registered Nurse    Filed:  10/19/2018  2:02 AM Date of Service:  10/19/2018 12:00 AM Creation Time:  10/19/2018  2:00 AM    Status:  Signed :  Jolie Antoine RN (Registered Nurse)         Patient has frequent, productive cough. Tessalon not effective. MD paged to  request Robitussin[LK1.1]     Revision History        User Key Date/Time User Provider Type Action    > LK1.1 10/19/2018  2:02 AM Jolie Antoine, RN Registered Nurse Sign            Progress Notes by Simona Leon CM at 10/18/2018  2:49 PM     Author:  Simona Leon CM Service:  (none) Author Type:      Filed:  10/18/2018  2:51 PM Date of Service:  10/18/2018  2:49 PM Creation Time:  10/18/2018  2:49 PM    Status:  Addendum :  Simona Leon CM ()         Spoke w/Zheng at the group home--discussed setting up the hospital follow-up appt w/in 7 days of discharge--he states they will set up the appt after pt discharges because they have to transport her and can't know at this time when would be a good time.[NM1.1]  Did go over the PNA action plan with the pt.[NM1.2]     Revision History        User Key Date/Time User Provider Type Action    > [N/A] 10/18/2018  2:51 PM Simona Leon CM  Addend     NM1.2 10/18/2018  2:51 PM Simona Leon CM  Sign     NM1.1 10/18/2018  2:49 PM Simona Leon CM              Progress Notes by Manuelito Smith MD at 10/18/2018  9:43 AM     Author:  Manuelito Smith MD Service:  Hospitalist Author Type:  Physician    Filed:  10/18/2018  9:49 AM Date of Service:  10/18/2018  9:43 AM Creation Time:  10/18/2018  9:43 AM    Status:  Addendum :  Manuelito Smith MD (Physician)         [facility]  Hospitalist Progress Note[AG1.1]  Manuelito Smith MD, MD 10/18/2018[AG1.2]  (Text Page)  Reason for Stay (Diagnosis): Fever, cough, nasal congestion, shortness of breath         Assessment and Plan:      Nena Tang is a 58 year old female complex medical history currently living at a group home setting with known hypertension, CINDY noncompliant with appliance, CAD, dyslipidemia, prior polysubstance abuse but noted to be in remission, insulin requiring diabetes mellitus who presented earlier in the  emergency room due to numerous complaints of worsening coughing spells, decrease in appetite, fever and chills over the past 2 days duration     1.  Fever, chills, respiratory symptoms with cough, shortness of breath, leukocytosis, tachycardia, lactic acidosis with suspected early sepsis likely from possible underlying pneumonia.  -Chest x-ray showing no definitive infiltrates  -Current symptomatology and above findings highly suggestive of infectious process most likely respiratory tract in etiology.  -We will continue with IV antibiotics as started in the emergency room with Rocephin and Zithromax.  Pro-calcitonin though is low.  But leukocytosis is worsening.  -Infectious workup and cultures will be closely monitored and followed.   -As needed bronchodilators.  As needed antitussive.  -As needed APAP for fever spikes.     2.  Insulin requiring diabetes mellitus-we will resume her home regimen if she continues to demonstrate tolerance of diabetic diet.[AG1.1]  -Decrease her basal insulin and prandial short acting insulin due to decreased oral intake.[AG1.3]     3.  CAD-resume home regimen of aspirin and statins, metoprolol continued.     4.  Hypertension-on ARB.     5.  History of migraine headaches, will resume Imitrex and Topamax     6.  History of schizoaffective disorder, depressive type and PTSD-we will resume her regimen for days once reconciled.     Code status: Full as per patient's instructions  Admit to inpatient  Prophylaxis: Mechanical  Disposition: Back to group home likely needing at least 2 inpatient hospitalization days.            Interval History (Subjective):      Continuing care today.  Seen and examined.  Chart reviewed.  Case discussed with nursing care.  She is still feeling weak, still with intermittent coughing spells.  Had numerous fever spikes but decreasing in intensity.  Not much appetite but no reported nausea or vomiting.  No diarrhea.  No bleeding tendencies.         Physical Exam:       Last Vital Signs:[AG1.1]  /49 (BP Location: Right arm)  Pulse 90  Temp 100.9  F (38.3  C) (Oral)  Resp 20  Ht 1.524 m (5')  Wt 108.9 kg (240 lb)  LMP 01/06/2015  SpO2 91%  BMI 46.87 kg/m2[AG1.2]    I/O last 3 completed shifts:  In: 976 [P.O.:250; I.V.:726]  Out: 900 [Urine:900]  Wt Readings from Last 1 Encounters:   10/17/18 108.9 kg (240 lb)     Vitals:    10/17/18 1513 10/17/18 2136   Weight: 99.8 kg (220 lb) 108.9 kg (240 lb)[AG1.4]       Constitutional: Awake, weak appearing, cooperative, no apparent distress   Respiratory:  Fair air entry, bilateral mild rhonchi, no wheezes   Cardiovascular: Regular rate and rhythm, normal S1 and S2, and no murmur noted   Abdomen: Normal bowel sounds, soft, non-distended, non-tender   Skin: No rashes, no cyanosis, dry to touch   Neuro: Alert and oriented x3, no weakness, slow and soft, spontaneous and coherent speech   Extremities: Nonpitting edema both lower extremities, normal range of motion   Other(s):  Depressed mood, not agitated       All other systems: Negative          Medications:      All current medications were reviewed with changes reflected in problem list.         Data:      All new lab and imaging data was reviewed.   Labs:[AG1.1]    Recent Labs  Lab 10/17/18  1805 10/17/18  1619 10/17/18  1530   CULT PENDING No growth after 7 hours No growth after 7 hours       Recent Labs  Lab 10/18/18  0659 10/17/18  1531   WBC 17.6* 12.1*   HGB 9.0* 10.5*   HCT 28.1* 33.2*   MCV 97 98    212       Recent Labs  Lab 10/18/18  0659 10/17/18  1531    137   POTASSIUM 3.6 4.3   CHLORIDE 108 106   CO2 21 26   ANIONGAP 8 5   GLC 99 173*   BUN 15 15   CR 1.09* 1.04   GFRESTIMATED 52* 54*   GFRESTBLACK 62 66   ALLEN 8.3* 8.9   MAG 1.7  --    PROTTOTAL  --  8.0   ALBUMIN  --  3.7   BILITOTAL  --  0.2   ALKPHOS  --  95   AST  --  17   ALT  --  23     No results for input(s): SED, CRP in the last 168 hours.    Recent Labs  Lab 10/18/18  0902 10/18/18  0649  10/18/18  0153 10/17/18  2142 10/17/18  1531 10/17/18  1508   GLC  --  99  --   --  173*  --    BGM 92  --  167* 128*  --  172*     No results for input(s): INR in the last 168 hours.    Recent Labs  Lab 10/17/18  1805   COLOR Yellow   APPEARANCE Clear   URINEGLC Negative   URINEBILI Negative   URINEKETONE Negative   SG 1.016   UBLD Negative   URINEPH 6.0   PROTEIN Negative   NITRITE Negative   LEUKEST Negative   RBCU 0   WBCU <1[AG1.5]      Imaging:[AG1.1]   Results for orders placed or performed during the hospital encounter of 10/17/18   XR Chest 2 Views    Narrative    CHEST TWO VIEWS 10/17/2018 4:30 PM     HISTORY: Fever.       Impression    IMPRESSION: Allowing for suboptimal inspiration, the lungs appear  grossly clear. There may be mild vascular congestion in the upper  lobes. However, no pleural effusions are demonstrated.    ALONSO MONTEIRO MD     *Note: Due to a large number of results and/or encounters for the requested time period, some results have not been displayed. A complete set of results can be found in Results Review.[AG1.5]          Revision History        User Key Date/Time User Provider Type Action    > AG1.3 10/18/2018  9:49 AM Manuelito Smith MD Physician Addend     AG1.5 10/18/2018  9:48 AM Manuelito Smith MD Physician Sign     AG1.4 10/18/2018  9:45 AM Manuelito Smith MD Physician      AG1.2 10/18/2018  9:44 AM Manuelito Smith MD Physician      AG1.1 10/18/2018  9:43 AM Manuelito Smith MD Physician             ED Provider Notes by Nick Mclaughlin MD at 10/17/2018  3:03 PM     Author:  Nick Mclaughlin MD Service:  Emergency Medicine Author Type:  Physician    Filed:  10/18/2018  1:02 AM Date of Service:  10/17/2018  3:03 PM Creation Time:  10/17/2018  3:11 PM    Status:  Signed :  Nick Mclaughlin MD (Physician)           History     Chief Complaint:  Cough    HPI   Nena Tang is a 58 year old female with a history of pneumonia  "who presents to the emergency department today with cough. The patient has been feeling down and feverish just this afternoon. She states she has been feeling \"down and out[ER1.1]\" over the last week with congestion and rhinorrhea.[EA1.1] She[ER1.1] has had productive coughing over the past 3-4 days with[EA1.1] associate[ER1.1]d[EA1.1] shortness of breath and she states that this is not normal for her. Further, the patient endorses abdominal pain, loose stool for the last couple days, back pain[ER1.1] (which is chronic for her)[EA1.1], urinary frequency, dysuria.[ER1.1] She denies any neck stiffness or headaches. O[EA1.1]f note, the patient lost her  in the past and went through a period where she \"[ER1.1]gave[ER1.2] up on life and tried to commit suicide multiple times.\"[ER1.1] B[EA1.1]ecause of this she now lives in a group home although denies that she has any feelings of hurting or killing herself here in the emergency department.  The patient denies history of COPD, heart or lung problems, or chest pain here in the emergency department.     Allergies:[ER1.1]  Imidazole Antifungals  Ketoprofen  Lisinopril  Metformin  Metronidazole  Posaconazole[ER1.3]    Medications:    Albuterol  Aspirin   Lipitor  Cogentin[ER1.1]   Diphenhydramine[ER1.2]   trulicity[ER1.1]   Flonase[ER1.2]  Gabapentin   Insulin aspart   Insulin glargine   Insulin[ER1.1] pen needle[ER1.2]  Losartan   Metoprolol succinate[ER1.1]   Invega  Miralax  Compazine   Ranitidine   Senna-docusate  Zoloft   Sumatriptan   Topiramate[ER1.4]    Past Medical History:[ER1.1]    Coronary artery disease  Chronic low back pain   Cocaine abuse, in remission   Fecal urgency   History of heroin abuse  Hyperlipidemia   Hypertension   Illiterate  Irritable bowel syndrome   Left cataract  Migraine   Moderate major depression   Noncompliance with medication regimen   Obesity   Obstructive sleep apnea  Osteopenia   Schizoaffective disorder  Suicidal intent "   Takotsubo cario myopathy   Type 2 Diabetes   Uterine cancer    Verbal auditory hallucination   Rotator cuff syndrome   Cervicalgia   Dyskinesia   Gastroesophageal Reflux Disease  Pneumonia  Sepsis   Encephalopathy[ER1.4]     Past Surgical History:[ER1.1]    C oophorectomy   Cataract   Colonoscopy   Coronary CTA  Hysterectomy  Laparoscopic Cholecystectomy   Phacoemulsification clear cornea with standard intraocular lens implant x2  Release trigger finger x2[ER1.4]    Family History:[ER1.1]    Cancer  COPD   Cirrhosis of liver   Alcohol/drug  Diabetes   Hypertension   Hyperlipidemia   Coronary artery disease  Glaucoma   Mental illness  Psychotic disorder   Colorectal cancer     Social History:  The patient was alone in the emergency department.  Smoking Status: Never smoker   Smokeless Tobacco: Never used   Alcohol Use: No[ER1.4]   Marital Status:        Review of Systems   Constitutional: Positive for[ER1.1] diaphoresis[ER1.4] and[ER1.1] fever[ER1.4].   HENT: Positive for[ER1.1] congestion[EA1.1] and[ER1.1] sneezing[EA1.1]. Negative for[ER1.1] sore throat[EA1.1].    Respiratory: Positive for[ER1.1] cough[EA1.1] and[ER1.1] shortness of breath[ER1.4].    Cardiovascular: Negative for[ER1.1] chest pain[ER1.4].   Gastrointestinal: Positive for[ER1.1] abdominal pain[ER1.4] and[ER1.1] blood in stool[ER1.4].   Genitourinary: Positive for[ER1.1] dysuria[ER1.4],[ER1.1] frequency[ER1.4] and[ER1.1] urgency[EA1.1]. Negative for[ER1.1] hematuria[EA1.1].   Musculoskeletal: Positive for[ER1.1] arthralgias[ER1.4],[ER1.1] back pain (chronic low back pain)[EA1.1] and[ER1.1] myalgias[ER1.4]. Negative for[ER1.1] neck stiffness[EA1.1].   Neurological: Negative for[ER1.1] dizziness[EA1.1] and[ER1.1] headaches[EA1.1].[ER1.1]   All other systems reviewed and are negative[ER1.4].    Physical Exam   First Vitals:[ER1.1]  Patient Vitals for the past 24 hrs:   BP Temp Temp src Pulse Resp SpO2 Weight   10/17/18 1830 - 102.8  F (39.3  C)  - - - - -   10/17/18 1813 - 102.8  F (39.3  C) Oral - - - -   10/17/18 1800 151/80 - - - - 93 % -   10/17/18 1701 152/76 - - - - 92 % -   10/17/18 1615 159/78 - - - - 92 % -   10/17/18 1600 148/89 - - - - 97 % -   10/17/18 1545 150/75 - - - - 94 % -   10/17/18 1530 160/58 - - - - - -   10/17/18 1515 160/80 - - - - 97 % -   10/17/18 1513 156/81 103  F (39.4  C) Oral 104 20 95 % 99.8 kg (220 lb)[ER1.5]       Physical Exam[ER1.1]  General: Alert, no acute distress; ill appearing  Neuro:  PERRL.  EOMI.  No focal deficits; CN II-XII grossly intact  HEENT:  Moist mucous membranes.  Posterior oropharynx clear, no exudates.  Conjunctiva normal. TMs clear bilaterally; no meningismus; jolt test negative  CV:  Tachycardic, regular rhythm, no m/r/g, skin warm and well perfused  Pulm:  Decreased breath sounds bilaterally with bilateral rales; no wheezing. No acute distress, breathing comfortably  GI:  Soft, nontender, nondistended.  No rebound or guarding.  Normal bowel sounds  MSK:  Moving all extremities.  No focal areas of edema, erythema, or tenderness  Skin:  WWP, no rashes, no lower extremity edema, skin color normal, no diaphoresis  Psych:  Well-appearing, normal affect, regular speech[ER1.6]  Emergency Department Course   Imaging:  Radiology findings were communicated with the[ER1.1] patient[ER1.4] who voiced understanding of the findings.[ER1.1]    XR Chest 2 Views:[ER1.4]   IMPRESSION: Allowing for suboptimal inspiration, the lungs appear  grossly clear. There may be mild vascular congestion in the upper  lobes. However, no pleural effusions are demonstrated.[ER1.7]  Report per radiology[ER1.4]     Laboratory:  Laboratory findings were communicated with the[ER1.1] patient[ER1.4] who voiced understanding of the findings.[ER1.1]    ISTAT gases lactate jocelyn POCT: pH Venous[ER1.4] 7.31 (L)[ER1.7], PCO2 Venous[ER1.4] 45[ER1.7], PO2 Venous[ER1.4] 23 (L)[ER1.7], Bicarbonate Venous[ER1.4] 2[ER1.7], O2 Sat Venous[ER1.4]  33[ER1.7], Lactic Acid[ER1.4] 2.0[ER1.7]    Blood culture:[ER1.4] Pending[ER1.7]     Influenza A/B antigen:[ER1.4] Negative[ER1.7]     Lactic Acid:[ER1.4] 1.4    Lactic acid: 2.1 (H)[ER1.7]     UA:[ER1.4] yellow clear urine mucous urine present[ER1.7] o/w WNL    Urine cu[ER1.4]l[ER1.2]ture aerobic bacteria only:[ER1.4] Pending[ER1.7]     CMP:[ER1.4] Glucose 173 (H), GFR Estimate 54 (L) o/w[ER1.7]WNL (Creatinine[ER1.4] 1.04[ER1.7])    CBC:[ER1.4] WBC 12.1 (H), HGB 10.5 (L)[ER1.7] o/w WNL.[ER1.4]  ([ER1.7]PLT[ER1.4] 212[ER1.7])     Glucose by meter[ER1.4]: 172 (H)[ER1.7]    Interventions:[ER1.1]  1603[ER1.7]: Tylenol[ER1.4] 500[ER1.7] mg PO[ER1.4]  1533[ER1.7]: NS 1L IV Bolus[ER1.4]   1735: NS 1L IV Bolus   1911: Zithromax 500 mg IV[ER1.7]      Emergency Department Course:  Nursing notes and vitals reviewed.[ER1.1]  The patient provided a urine sample here in the emergency department. This was sent for laboratory testing, findings above.  IV was inserted and blood was drawn for laboratory testing, results above.  The patient was sent for a XR Chest 2 Views while in the emergency department, results above.[ER1.7]   1515[ER1.4]: I performed an exam of the patient as documented above.[ER1.1]     Findings and plan explained to the Patient who consents to admission. Discussed the patient with Dr. Smith, who will admit the patient to a medical bed for further monitoring, evaluation, and treatment.[ER1.7]    I personally reviewed the laboratory[ER1.1] and imaging[ER1.7] results with the[ER1.1] Patient[ER1.7] and answered all related questions prior to[ER1.1] admission.[ER1.7]  Impression & Plan    Medical Decision Making:[ER1.1]  Nena Tang is a 58 year old female who presents to the emergency department today with cough. She has been having URI symptoms over the last week. She also complains of dysuria[ER1.2] and productive coughing with URI symptoms over the last week[EA1.1] . She presents here to the emergency  department from her group home for further evaluatiion. She is noted to be febrile to 103 and tac[ER1.2]h[ER1.8]ycardic per EMS, she did have hypoxia on room air to 89% initially.  She is on room air here with o[ER1.2]x[ER1.8]ygen saturations from low to mid 90s. Exam is above. Lactate is slightly elevated (2.1) and she does have leukocytosis. She is otherwise hemodynamically stable. Influenza swab was negative. Given concern for sepsis[ER1.2],[ER1.8] blood clu[ER1.2]lture[ER1.8]s were sent and IV fluids were given. She did have improvement of her lactate. Suprisingly[ER1.2], h[ER1.8]er chest x-ray was unrermarkable for any obvious penumonia. Urine is unremarkable. Given main complaint is coughing and some shortness of breath[ER1.2],[ER1.8] I suspect that this may be early signs of pneumonia and will treat for community aquired pneumonia[ER1.2] at this time[ER1.8]. Otherwise, no headache or neck sti[ER1.2]ff[ER1.8]ness[ER1.2] suggest[ER1.8] meningitis. She does complain of chronic low back pain and has been having difficulty with ambulating over the last month but I do not suspect cauda equina, spinal abscess causing her symptoms that would require more advanced imaging. I will admit to medicine for continued cares. The patient remained hemodynamically stable and was admitted in stable condition.[ER1.2]       Diagnosis:[ER1.1]    ICD-10-CM    1. Sepsis, due to unspecified organism (H) A41.9 Lactic acid whole blood   2. Productive cough R05     possible early pneumonia   3. Shortness of breath R06.02[ER1.9]        Disposition:[ER1.1]  Admitted to[ER1.9] medical bed.[ER1.10]   Jayson Garcia  10/17/2018   [facility] EMERGENCY DEPARTMENT  Scribe Disclosure:  IJayson, am serving as a scribe at 3:11 PM on 10/17/2018 to document services personally performed by Nick Mclaughlin MD based on my observations and the provider's statements to me.[ER1.1]        Nick Mclaughlin MD  10/18/18  0102  [EA1.2]     Revision History        User Key Date/Time User Provider Type Action    > EA1.2 10/18/2018  1:02 AM Nick Mclaughlin MD Physician Sign     EA1.1 10/18/2018 12:58 AM Nick Mclaughlin MD Physician      ER1.8 10/17/2018  9:38 PM Rich, Jayson Scribe Share     ER1.2 10/17/2018  9:00 PM Rich, Jayson Scribe Share     ER1.5 10/17/2018  8:25 PM Rich, Jayson Scribe Share     ER1.10 10/17/2018  8:24 PM Rich, Jayson Scribe      ER1.7 10/17/2018  8:07 PM Rich, Jayson Scribe Share     ER1.9 10/17/2018  7:59 PM Rich, Jayson Scribe Share     ER1.4 10/17/2018  4:27 PM Rich, Jayson Scribe Share     ER1.6 10/17/2018  3:41 PM Rich, Jayson Scribe Share     ER1.3 10/17/2018  3:21 PM Rich, Jayson Scribe Share     ER1.1 10/17/2018  3:11 PM Rich, Jayson Scribe                   Procedure Notes     No notes of this type exist for this encounter.      Progress Notes - Therapies (Notes from 10/18/18 through 10/21/18)     No notes of this type exist for this encounter.

## 2018-10-17 NOTE — ED NOTES
Bed: ED08  Expected date: 10/17/18  Expected time: 2:51 PM  Means of arrival: Ambulance  Comments:  HE 57yo woumd infection

## 2018-10-17 NOTE — IP AVS SNAPSHOT
` ` Patient Information     Patient Name Sex Nena Taylor ([id number]) Female 1960       Room Bed    [facility] 0544-01      Patient Demographics     Address Phone E-mail Address    [address] [place], [place] [id number] [phone] (Home) *Preferred*  NONE (Work)  [phone] (Mobile) [email or web address]      Patient Ethnicity & Race     Ethnic Group Patient Race    American       Emergency Contact(s)     Name Relation Home Work Mobile    Scot Vogt   [phone]      Documents on File        Status Date Received Description       Documents for the Patient    Insurance Card Received () 09     Face Sheet  () 09     Insurance Card  () 09     External Medication Information Consent Accepted () 09     Face Sheet Received () 09     Face Sheet Received () 06/14/10     Patient ID Received () 16     Consent for Services - Hospital/Clinic Received () 01/10/11     Consent for Services - Hospital/Clinic Received () 12/29/10     External Medication Information Consent Accepted () 01/10/11     Consent for Services - Hospital/Clinic Received () 11     Insurance Card Received () 11     Privacy Notice - [place] Received () 11     Insurance Card Received () 10/08/11 Medicaid    CMS IM for Patient Signature Received 10/02/13     External Medication Information Consent Accepted () 12     Consent for Services - Hospital/Clinic  () 03/15/12 CONSENT FOR SERVICES - CLINIC AND HOD    Consent for Services - Hospital/Clinic Received () 12     Consent for Services - Hospital/Clinic Received () 10/11/12     External Medication Information Consent Accepted 13     Insurance Card Received () 18 Medicare A&B     Consent for EHR Access  13 Copied from existing Consent for services  - C/HOD collected on 10/11/2012    HIM MUSA Authorization - File Only   Zoe Davenport MUSA [id number]    [facility] Specified Other Received () 14 bor    HIM MUSA Authorization - File Only  09/10/13 REX PERZE    Consent for Services - Hospital/Clinic Received () 13     Consent to Communicate Received () 13     HIM MUSA Authorization  13     Consent for EHR Access Received 10/02/13     Consent for Services - Hospital/Clinic Received () 10/02/13     Privacy Notice - [place] Received 10/02/13 ACKNOWLEDGMENT OF RECEIPT OF NOTICE OF PRIVACY PRACTICE    HIM MUSA Authorization - File Only  10/18/13 IVON BURNHAM    HIM MUSA Authorization  14 [facility]    HIM MUSA Authorization - File Only   14 MUSA [employer]    HIM MUSA Authorization - File Only   14 [facility]    Consent to Communicate  ()  14 Auth to Discuss PHI [employer]    HIM MUSA Authorization  14 [employer]    HIM MUSA Authorization - File Only   Thao Ernandez, MUSA, 14    Immunization Record   [place] TB Skin Test Record Form 07/10/14    HIM MUSA Authorization - File Only   Thao Ernandez 14    HIM MUSA Authorization - File Only   shaila morgan assoc request    HIM MUSA Authorization - File Only   Thao Ernandez 2014    Consent for Services - Hospital/Clinic Received () 14     HIM MUSA Authorization - File Only  14 DEC RELEASE OF INFORMATION    HIM MUSA Authorization - File Only  01/06/15 [facility]    HIM MUSA Authorization - File Only  01/06/15 AL[facility]    HIM MUSA Authorization  01/23/15 [facility]    Consent to Communicate  ()  03/02/15 Auth to Discuss PHI Bar Valiente    HIM MUSA Authorization - File Only  03/08/15 DEC RELEASE OF INFORMATION    HIM MUSA Authorization - File Only  03/24/15 ROSSI NEELY- [facility].    [facility] MUSA  Authorization - File Only  03/24/15 HUMAIRA BURNHAM    HIM MUSA Authorization  04/02/15 [facility] MUSA Authorization - File Only  06/17/15 DEC RELEASE OF INFORMATION    HIM MUSA Authorization - File Only  07/03/15 [facility]    [facility] MUSA Authorization - File Only  07/03/15 [facility] -ROSSI NEELY    HIM MUSA Authorization - File Only  07/03/15 [facility]    Consent to Communicate  () 09/02/15 AUTH TO DISCUSS PHI ADELITA LAINEZ    HIM MUSA Authorization  10/01/15 [facility]    Consent for Services/Privacy Notice - Hospital/Clinic Received () 16     HIM MUSA Authorization - File Only  02/15/16 DEC RELEASE OF INFORMATION    HIM MUSA Authorization - File Only  16 [facility] MUSA Authorization - File Only  16 [facility]    Business/Insurance/Care Coordination/Health Form - Patient  16 [employer] DENIAL OF COVERAGE ON ONE TOUCH ULTRA TEST STRIPS 16    Patient ID Received 17 exp     Insurance Card Received () 17 [facility]     [facility] MUSA Authorization - File Only  16 [place] [facility]    Consent to Communicate  () 16 AUTHORIZATION TO DISCUSS PROTECTED HEALTH INFORMATION    Consent for Services - [facility]       Consent to Communicate  () 16 AUTHORIZATION TO DISCUSS PROTECTED HEALTH INFORMATION, 16    HIM MUSA Authorization - File Only  10/24/16 DEC RELEASE OF INFORMATION    [facility] Specified Other Received () 16 not needed, refer to CFS     HIM MUSA Authorization - File Only  16 DEC RELEASE OF INFORMATION    HIM MUSA Authorization - File Only  17 [facility]    HIM MUSA Authorization  17 [facility]/ [facility] MUSA Authorization - File Only  17 [facility]- [facility] MUSA Authorization - File Only  17 [facility]    HIM MUSA Authorization - File Only  17 [facility])    HIM MUSA Authorization - File Only  17 HIM MUSA AUTHORIZATION-FILE ONLY    HIM MUSA Authorization - File Only  17 EDUARDA HARTLEY-[facility]     HIM MUSA Authorization - File Only  17 [id number]    HIM MUSA Authorization - File Only  17 HIM MUSA AUTHORIZATION-FILE ONLY    HIM MUSA Authorization - File Only  17 [employer]    HIM MUSA Authorization  17 MATTI PARHAM/[facility]    Consent to Communicate Received () 17 Auth to Discuss PHI    HIM MUSA Authorization - File Only  17 [facility] - MUSA    Speech Therapy Certification Received 10/03/17 VFSS and x1 treatment 17-17    HIM MUSA Authorization - File Only  10/06/17 [facility] - MUSA    Care Everywhere Prospective Auth Received 10/23/17     HIM MUSA Authorization  11/15/17 MAT[facility] PARHAM/ [facility]    HIM MUSA Authorization - File Only  17 [facility] MUSA Authorization - File Only  17 [facility]- MUSA    HIM MUSA Authorization - File Only  18 [facility]- MUSA    HIM MUSA Authorization - File Only  18 [place]    Consent for Services/Privacy Notice - Hospital/Clinic Received 18     Consent for Services/Privacy Notice - Hospital/Clinic Received () 18     HIM MUSA Authorization  18 [facility]SA S26 [place] [facility]    Business/Insurance/Care Coordination/Health Form - Patient  18 STATEMENT OF CERTIFYING PHYSICIAN 18 [facility]    HIM MUSA Authorization - File Only  18 SOCIAL SECURITY    Business/Insurance/Care Coordination/Health Form - Patient  18 PERMISSION TO EXCHANGE INFORMATION 18 [facility]    Insurance Card Received 18 [facility] 0565 - card issued 1-3-18    Insurance  Card Received 18 MHCP / [place] / [place]    Physical Therapy Certification Received 18-18    Business/Insurance/Care Coordination/Health Form - Patient Received () 18 Adult Rehab Attendance Policy    Consent for Services - Hospital and Clinic       HIE Auth Received 18     Consent for Services - Hospital and Clinic Received 18     HIM MUSA Authorization  18 PATIENT - [facility]    Business/Insurance/Care Coordination/Health Form - Patient  18 WRITTEN PERMISSION TO EXCHANGE INFORMATION 18 [place] [facility]    [facility] MUSA Authorization - File Only  18 RELEASR FOR COORDINATION OF CARE    Physical Therapy Re-Certification   18-18    Consent to Communicate  18 AUTHORIZATION TO DISCUSS PROTECTED  HEALTH INFORMATION 18    Business/Insurance/Care Coordination/Health Form - Patient  18 ADULT REHABILITATION ATTENDANCE POLICY 18    Privacy Notice - [place] Received (Deleted) 09     Consent for Services/Privacy Notice - Hospital/Clinic-Esign  (Deleted)      Consent for Services/Privacy Notice - Hospital/Clinic Received (Deleted) 17     CE Prospective Auth Received () 18 Authorization Document from Social Security Administration    System-Retrieved CE Auth Form Received () 18 External Authorization Form from Social Security Administration       Documents for the Encounter    CMS IM for Patient Signature Received 10/18/18     CMS IM for Patient Signature Received 10/21/18 2nd IMM reviewed w/ son      Admission Information     Attending Provider Admitting Provider Admission Type Admission Date/Time    Manuelito Smith MD Galindez, Al Gilbert, MD Emergency 10/17/18  1503    Discharge Date Hospital Service Auth/Cert Status Service Area     General Medicine [facility] SERVICES    Unit Room/Bed Admission Status        5 MEDICAL SURGICAL 0544/0544-01 Admission (Confirmed)        Admission     Complaint    Sepsis (H)      Hospital Account     Name Acct ID Class Status Primary Coverage    Nena Tang [id number] Inpatient Open MEDICARE - MEDICARE            Guarantor Account (for Hospital Account #[id number])     Name Relation to Pt Service Area Active? Acct Type    Nena Tang  FCS Yes Personal/Family    Address Phone          [address] [place] [address] [phone](H)  NONE(O)              Coverage Information (for Hospital Account #[id number])     1. MEDICARE/MEDICARE     F/O Payor/Plan Precert #    MEDICARE/MEDICARE     Subscriber Subscriber #    Nena Tang [id number]    Address Phone    ATTN CLAIMS  [address]  [facility] [place] [address] [phone]          2. MEDICA/MEDICA ACCESS [place]ILITY [place]     F/O Payor/Plan Precert #    MEDICA/MEDICA ACCESS [place]ILITY [place]     Subscriber Subscriber #    Nena Tang [id number]    Address Phone    [address] [address]  [place] [address] [phone]

## 2018-10-17 NOTE — IP AVS SNAPSHOT
MRN:[id number]                      After Visit Summary   10/17/2018    Nena Tang    MRN: [id number]           Thank you!     Thank you for choosing [facility] for your care. Our goal is always to provide you with excellent care. Hearing back from our patients is one way we can continue to improve our services. Please take a few minutes to complete the written survey that you may receive in the mail after you visit. If you would like to speak to someone directly about your visit please contact Patient Relations at [phone]. Thank you!          Patient Information     Date Of Birth          1960        Designated Caregiver       Most Recent Value    Caregiver    Will someone help with your care after discharge? yes    Name of designated caregiver Group Home Staff    Phone number of caregiver In Chart    Caregiver address Same as Patient      About your hospital stay     You were admitted on:  October 17, 2018 You last received care in the:  [address] [facility]    You were discharged on:  October 21, 2018        Reason for your hospital stay       Your admitted earlier with increasing fever, generalized weakness, decreased oral intake, coughing spells and found to have high fever spikes.  Consideration for bacterial infection but with no clear-cut evidence of pneumonia or UTI were entertained and he was provided with antibiotics during her stay.                  Who to Call     For medical emergencies, please call 911.  For non-urgent questions about your medical care, please call your primary care provider or clinic, [phone]          Attending Provider     Provider Nick Barron MD Emergency Medicine    Manuelito Smith MD Internal Medicine       Primary Care Provider Office Phone # Fax #    ART Arias [phone]      After Care Instructions     Activity       Your activity upon discharge:  activity as tolerated            Diet       Follow this diet upon discharge: Orders Placed This Encounter      Regular Diet Adult                  Follow-up Appointments     Follow-up and recommended labs and tests        Follow up with primary care provider, Rowena Haas, within 7 days to evaluate medication change, to evaluate treatment change and for hospital follow- up.  No follow up labs or test are needed.                  Your next 10 appointments already scheduled     Oct 22, 2018 10:30 AM CDT   (Arrive by 10:15 AM)   CT LUMBAR SPINE W/O CONTRAST with [id number] ([facility])    [address] [facility] [address]  [facility] [address] [phone]           How do I prepare for my exam? (Food and drink instructions) No Food and Drink Restrictions.  How do I prepare for my exam? (Other instructions) You do not need to do anything special to prepare for this exam. For a sinus scan: Use your nose spray (nasal decongestant spray) as directed.  What should I wear: Please wear loose clothing, such as a sweat suit or jogging clothes. Avoid snaps, zippers and other metal. We may ask you to undress and put on a hospital gown.  How long does the exam take: Most scans take less than 20 minutes.  What should I bring: Please bring any scans or X-rays taken at other hospitals, if similar tests were done. Also bring a list of your medicines, including vitamins, minerals and over-the-counter drugs. It is safest to leave personal items at home.  Do I need a : No  is needed.  What do I need to tell my doctor? Be sure to tell your doctor: * If you have any allergies. * If there s any chance you are pregnant. * If you are breastfeeding.  What should I do after the exam: No restrictions, You may resume normal activities.  What is this test: A CT (computed tomography) scan is a series of pictures that allows us to look inside your body. The scanner  creates images of the body in cross sections, much like slices of bread. This helps us see any problems more clearly.  Who should I call with questions: If you have any questions, please call the Imaging Department where you will have your exam. Directions, parking instructions, and other information is available on our website, [email or web address].            Oct 22, 2018  1:00 PM CDT   Ortho Treatment with Kiko Gallego, PT   [facility] ([facility])    [address] [facility] [address]   [phone]            Oct 29, 2018  1:00 PM CDT   Ortho Treatment with Kiko Gallego, PT   [facility] ([facility])    [address] [facility] [id number] [phone]            Nov 01, 2018  9:30 AM CDT   Return Visit with ART Arias CNP   [facility] ([facility])    [address]  [address] [place] [id number] [phone]            Nov 01, 2018  9:30 AM CDT   Return Visit with Richardson Lopez, [facility] ([facility])    [address] [place] [address]  [address] [place] [id number] [phone]            Nov 01, 2018  9:30 AM CDT   Office Visit with Eugenia De Jesus [facility] ([facility])    [address] [place] [address]  [address] [place] [id number] [phone]           Bring a current list of meds and any records pertaining to this visit. For Physicals, please bring immunization records and any forms needing to be filled out. Please arrive 10 minutes early to complete paperwork.            Nov 30, 2018  1:30 PM CST   Return Visit with Kraig Pedersen MD   [facility] ([facility])    [address] [facility] [place] [facility] [id number] [phone]             Mar 14, 2019  3:15 PM CDT   RETURN RETINA with Tiburcio Mercedez Chacon MD   [facility] ([facility])    [place] [address]  [address] [address] [phone]              Pending Results     Date and Time Order Name Status Description    10/17/2018 1525 Blood culture Preliminary     10/17/2018 1521 Blood culture Preliminary             Statement of Approval     Ordered          10/21/18 0952  I have reviewed and agree with all the recommendations and orders detailed in this document.  EFFECTIVE NOW     Approved and electronically signed by:  Manuelito Smith MD             Admission Information     Date & Time Provider Department Dept. Phone    10/17/2018 Manuelito Smith MD [place] [address] [facility] [phone]      Your Vitals Were     Blood Pressure Pulse Temperature Respirations Height Weight    172/67 (BP Location: Left arm) 74 96.2  F (35.7  C) (Oral) 16 1.524 m (5') 108.9 kg (240 lb)    Last Period Pulse Oximetry BMI (Body Mass Index)             01/06/2015 95% 46.87 kg/m2         Commun[employer] Information     [facility] gives you secure access to your electronic health record. If you see a primary care provider, you can also send messages to your care team and make appointments. If you have questions, please call your primary care clinic.  If you do not have a primary care provider, please call [phone] and they will assist you.        Care EveryWhere ID     This is your Care EveryWhere ID. This could be used by other organizations to access your [facility] medical records  [id number]        Equal Access to Services     RAMESH GARRIDO : Hadii samira Rios, waaxda luqadaha, qaybta kaalmalorena anderson. So [facility] [phone].    ATENCIÓN: Si habla español, tiene a trinh disposición servicios gratuitos de asistencia lingüística. Llame al [phone].    We comply with  applicable federal civil rights laws and [place] laws. We do not discriminate on the basis of race, color, national origin, age, disability, sex, sexual orientation, or gender identity.               Review of your medicines      START taking        Dose / Directions    amoxicillin-clavulanate 875-125 MG per tablet   Commonly known as:  AUGMENTIN   Used for:  Sepsis, due to unspecified organism (H)        Dose:  1 tablet   Take 1 tablet by mouth 2 times daily for 2 days   Quantity:  4 tablet   Refills:  0       guaiFENesin 20 mg/mL Soln solution   Commonly known as:  ROBITUSSIN   Used for:  Productive cough        Dose:  10 mL   Take 10 mLs by mouth every 6 hours as needed for cough   Quantity:  236 mL   Refills:  0         CONTINUE these medicines which have NOT CHANGED        Dose / Directions    acetaminophen 500 MG tablet   Commonly known as:  TYLENOL   Used for:  Chronic low back pain, unspecified back pain laterality, with sciatica presence unspecified        Dose:  1000 mg   Take 2 tablets (1,000 mg) by mouth 3 times daily as needed for mild pain   Quantity:  100 tablet   Refills:  3       albuterol 108 (90 Base) MCG/ACT inhaler   Commonly known as:  PROAIR HFA/PROVENTIL HFA/VENTOLIN HFA   Used for:  Pneumonia, organism unspecified(486)        Dose:  2 puff   Inhale 2 puffs into the lungs every 6 hours as needed for shortness of breath / dyspnea or wheezing   Quantity:  1 Inhaler   Refills:  0       aspirin 81 MG EC tablet   Used for:  Coronary artery disease involving native heart with angina pectoris, unspecified vessel or lesion type (H)        TAKE 1 TABLET (81MG) BY MOUTH DAILY   Quantity:  28 tablet   Refills:  3       atorvastatin 80 MG tablet   Commonly known as:  LIPITOR   Used for:  Hyperlipidemia LDL goal <100        TAKE 1 TABLET (80MG) BY MOUTH DAILY   Quantity:  30 tablet   Refills:  11       benztropine 0.5 MG tablet   Commonly known as:  COGENTIN   Used for:  Extrapyramidal and movement  disorder        Dose:  0.5 mg   Take 1 tablet (0.5 mg) by mouth 2 times daily   Quantity:  60 tablet   Refills:  2       calcium carbonate 600 mg (elemental) 1500 (600 Ca) MG tablet   Commonly known as:  OS-ALLEN   Used for:  Other osteoporosis without current pathological fracture        Dose:  1500 mg   Take 1 tablet (1,500 mg) by mouth daily   Quantity:  180 tablet   Refills:  3       dulaglutide 1.5 MG/0.5ML pen   Commonly known as:  TRULICITY   Used for:  Type 2 diabetes mellitus with mild nonproliferative retinopathy without macular edema, with long-term current use of insulin, unspecified laterality (H)   Notes to Patient:  Resume previous regimen.        Dose:  1.5 mg   Inject 1.5 mg Subcutaneous every 7 days   Quantity:  2 mL   Refills:  3       fluticasone 50 MCG/ACT spray   Commonly known as:  FLONASE   Used for:  Seasonal allergic rhinitis, unspecified chronicity, unspecified trigger        Dose:  1-2 spray   Spray 1-2 sprays into both nostrils daily   Quantity:  1 Bottle   Refills:  11       gabapentin 300 MG capsule   Commonly known as:  NEURONTIN   Used for:  Type 2 diabetes mellitus with diabetic neuropathy, with long-term current use of insulin (H)        TAKE 2 CAPSULES (600MG) BY MOUTH THREE TIMES A DAY   Quantity:  168 capsule   Refills:  1       glucose 4 g Chew chewable tablet   Used for:  Type 2 diabetes mellitus with mild nonproliferative retinopathy without macular edema, with long-term current use of insulin, unspecified laterality (H)        Take 2 every 15 minutes for blood sugar <70mg/dL. Recheck blood sugar every 15 minutes until above 70mg/dL, then eat a substantial meal.   Quantity:  20 tablet   Refills:  1       ibuprofen 600 MG tablet   Commonly known as:  ADVIL/MOTRIN   Used for:  Closed fracture of one rib of right side, initial encounter        Dose:  600 mg   Take 1 tablet (600 mg) by mouth every 4 hours as needed for moderate pain   Quantity:  60 tablet   Refills:  0        insulin aspart 100 UNIT/ML injection   Commonly known as:  NovoLOG FLEXPEN   Used for:  Type 2 diabetes mellitus with mild nonproliferative retinopathy without macular edema, with long-term current use of insulin, unspecified laterality (H)        Dose:  20 Units   Inject 20 Units Subcutaneous 3 times daily (with meals) Once daily, can add additional 5 units if BGs are >500mg/dL.   Quantity:  15 mL   Refills:  11       insulin glargine 100 UNIT/ML injection   Commonly known as:  LANTUS   Used for:  Type 2 diabetes mellitus with mild nonproliferative retinopathy without macular edema, with long-term current use of insulin, unspecified laterality (H)        Dose:  48 Units   Inject 48 Units Subcutaneous At Bedtime   Quantity:  3 mL   Refills:  11       ipratropium - albuterol 0.5 mg/2.5 mg/3 mL 0.5-2.5 (3) MG/3ML neb solution   Commonly known as:  DUONEB   Used for:  Wheezing        Dose:  1 vial   Take 1 vial (3 mLs) by nebulization every 6 hours as needed for shortness of breath / dyspnea or wheezing   Quantity:  30 vial   Refills:  0       losartan 100 MG tablet   Commonly known as:  COZAAR   Used for:  Coronary artery disease involving native heart with angina pectoris, unspecified vessel or lesion type (H)        TAKE 1 TABLET (100MG) BY MOUTH DAILY   Quantity:  28 tablet   Refills:  3       melatonin 5 MG Caps   Used for:  Insomnia, unspecified type        Dose:  2 capsule   Take 2 capsules by mouth daily At dinnertime   Quantity:  180 capsule   Refills:  2       metoprolol succinate 25 MG 24 hr tablet   Commonly known as:  TOPROL-XL   Used for:  Coronary artery disease involving native heart with angina pectoris, unspecified vessel or lesion type (H)        Dose:  25 mg   Take 1 tablet (25 mg) by mouth daily   Quantity:  30 tablet   Refills:  1       paliperidone 9 MG 24 hr tablet   Commonly known as:  INVEGA   Used for:  Schizoaffective disorder, bipolar type (H)        Dose:  9 mg   Take 1 tablet (9 mg) by  mouth every morning   Quantity:  30 tablet   Refills:  2       polyethylene glycol powder   Commonly known as:  MIRALAX/GLYCOLAX   Used for:  Constipation, unspecified constipation type        TAKE 8.5 GRAMS (1/2 CAPFUL) BY MOUTH DAILY   Quantity:  527 g   Refills:  3       ranitidine 300 MG tablet   Commonly known as:  ZANTAC   Used for:  Gastroesophageal reflux disease without esophagitis        TAKE 1 TABLET (300MG) BY MOUTH AT BEDTIME   Quantity:  90 tablet   Refills:  1       senna-docusate 8.6-50 MG per tablet   Commonly known as:  SENOKOT-S;PERICOLACE   Used for:  Drug-induced constipation        Dose:  1 tablet   Take 1 tablet by mouth 2 times daily as needed for constipation   Quantity:  100 tablet   Refills:  1       sertraline 100 MG tablet   Commonly known as:  ZOLOFT   Used for:  Schizoaffective disorder, bipolar type (H)        Dose:  200 mg   Take 2 tablets (200 mg) by mouth daily   Quantity:  60 tablet   Refills:  2       SUMAtriptan 25 MG tablet   Commonly known as:  IMITREX   Used for:  Migraine with aura and without status migrainosus, not intractable        Dose:  25-50 mg   Take 1-2 tablets (25-50 mg) by mouth at onset of headache for migraine May repeat in 2 hours. Max 8 tablets/24 hours.   Quantity:  12 tablet   Refills:  1       topiramate 25 MG tablet   Commonly known as:  TOPAMAX   Used for:  Morbid obesity (H)        Dose:  50 mg   Take 2 tablets (50 mg) by mouth 2 times daily   Quantity:  120 tablet   Refills:  3       vitamin D3 51821 UNITS capsule   Commonly known as:  CHOLECALCIFEROL   Used for:  Vitamin D deficiency   Notes to Patient:  Resume home regimen.         TAKE 1 CAPSULE (50,000 UNITS) MONTHLY   Quantity:  12 capsule   Refills:  1            Where to get your medicines      These medications were sent to [facility] [place], [place] - [address] [facility]  [address] [facility] [address]     Phone:  [phone]     amoxicillin-clavulanate  875-125 MG per tablet    guaiFENesin 20 mg/mL Soln solution                Protect others around you: Learn how to safely use, store and throw away your medicines at www.disposemymeds.org.        ANTIBIOTIC INSTRUCTION     You've Been Prescribed an Antibiotic - Now What?  Your healthcare team thinks that you or your loved one might have an infection. Some infections can be treated with antibiotics, which are powerful, life-saving drugs. Like all medications, antibiotics have side effects and should only be used when necessary. There are some important things you should know about your antibiotic treatment.      Your healthcare team may run tests before you start taking an antibiotic.    Your team may take samples (e.g., from your blood, urine or other areas) to run tests to look for bacteria. These test can be important to determine if you need an antibiotic at all and, if you do, which antibiotic will work best.      Within a few days, your healthcare team might change or even stop your antibiotic.    Your team may start you on an antibiotic while they are working to find out what is making you sick.    Your team might change your antibiotic because test results show that a different antibiotic would be better to treat your infection.    In some cases, once your team has more information, they learn that you do not need an antibiotic at all. They may find out that you don't have an infection, or that the antibiotic you're taking won't work against your infection. For example, an infection caused by a virus can't be treated with antibiotics. Staying on an antibiotic when you don't need it is more likely to be harmful than helpful.      You may experience side effects from your antibiotic.    Like all medications, antibiotics have side effects. Some of these can be serious.    Let you healthcare team know if you have any known allergies when you are admitted to the hospital.    One significant side effect of nearly  all antibiotics is the risk of severe and sometimes deadly diarrhea caused by Clostridium difficile (C. Difficile). This occurs when a person takes antibiotics because some good germs are destroyed. Antibiotic use allows C. diificile to take over, putting patients at high risk for this serious infection.    As a patient or caregiver, it is important to understand your or your loved one's antibiotic treatment. It is especially important for caregivers to speak up when patients can't speak for themselves. Here are some important questions to ask your healthcare team.    What infection is this antibiotic treating and how do you know I have that infection?    What side effects might occur from this antibiotic?    How long will I need to take this antibiotic?    Is it safe to take this antibiotic with other medications or supplements (e.g., vitamins) that I am taking?     Are there any special directions I need to know about taking this antibiotic? For example, should I take it with food?    How will I be monitored to know whether my infection is responding to the antibiotic?    What tests may help to make sure the right antibiotic is prescribed for me?      Information provided by:  www.cdc.gov/getsmart  U.S. Department of Health and Human Services  Centers for disease Control and Prevention  National Center for Emerging and Zoonotic Infectious Diseases  Division of Healthcare Quality Promotion             Medication List: This is a list of all your medications and when to take them. Check marks below indicate your daily home schedule. Keep this list as a reference.      Medications           Morning Afternoon Evening Bedtime As Needed    acetaminophen 500 MG tablet   Commonly known as:  TYLENOL   Take 2 tablets (1,000 mg) by mouth 3 times daily as needed for mild pain   Last time this was given:  650 mg on 10/19/2018  7:56 PM                                   albuterol 108 (90 Base) MCG/ACT inhaler   Commonly known as:   PROAIR HFA/PROVENTIL HFA/VENTOLIN HFA   Inhale 2 puffs into the lungs every 6 hours as needed for shortness of breath / dyspnea or wheezing                                   amoxicillin-clavulanate 875-125 MG per tablet   Commonly known as:  AUGMENTIN   Take 1 tablet by mouth 2 times daily for 2 days                                      aspirin 81 MG EC tablet   TAKE 1 TABLET (81MG) BY MOUTH DAILY   Last time this was given:  81 mg on 10/21/2018  8:40 AM                                   atorvastatin 80 MG tablet   Commonly known as:  LIPITOR   TAKE 1 TABLET (80MG) BY MOUTH DAILY   Last time this was given:  80 mg on 10/20/2018  9:13 PM                                   benztropine 0.5 MG tablet   Commonly known as:  COGENTIN   Take 1 tablet (0.5 mg) by mouth 2 times daily   Last time this was given:  0.5 mg on 10/21/2018  8:40 AM                                      calcium carbonate 600 mg (elemental) 1500 (600 Ca) MG tablet   Commonly known as:  OS-ALLEN   Take 1 tablet (1,500 mg) by mouth daily                                   dulaglutide 1.5 MG/0.5ML pen   Commonly known as:  TRULICITY   Inject 1.5 mg Subcutaneous every 7 days   Notes to Patient:  Resume previous regimen.         Resume previous regimen.                        fluticasone 50 MCG/ACT spray   Commonly known as:  FLONASE   Spray 1-2 sprays into both nostrils daily   Last time this was given:  1 spray on 10/21/2018  8:39 AM                                   gabapentin 300 MG capsule   Commonly known as:  NEURONTIN   TAKE 2 CAPSULES (600MG) BY MOUTH THREE TIMES A DAY   Last time this was given:  600 mg on 10/21/2018  4:04 PM                                         glucose 4 g Chew chewable tablet   Take 2 every 15 minutes for blood sugar <70mg/dL. Recheck blood sugar every 15 minutes until above 70mg/dL, then eat a substantial meal.                                   guaiFENesin 20 mg/mL Soln solution   Commonly known as:  ROBITUSSIN   Take 10 mLs by  mouth every 6 hours as needed for cough   Last time this was given:  10 mLs on 10/21/2018  2:18 PM                                   ibuprofen 600 MG tablet   Commonly known as:  ADVIL/MOTRIN   Take 1 tablet (600 mg) by mouth every 4 hours as needed for moderate pain                                   insulin aspart 100 UNIT/ML injection   Commonly known as:  NovoLOG FLEXPEN   Inject 20 Units Subcutaneous 3 times daily (with meals) Once daily, can add additional 5 units if BGs are >500mg/dL.   Last time this was given:  5 Units on 10/21/2018  2:13 PM            With Breakfast       With Lunch       With Dinner               insulin glargine 100 UNIT/ML injection   Commonly known as:  LANTUS   Inject 48 Units Subcutaneous At Bedtime                                   ipratropium - albuterol 0.5 mg/2.5 mg/3 mL 0.5-2.5 (3) MG/3ML neb solution   Commonly known as:  DUONEB   Take 1 vial (3 mLs) by nebulization every 6 hours as needed for shortness of breath / dyspnea or wheezing                                   losartan 100 MG tablet   Commonly known as:  COZAAR   TAKE 1 TABLET (100MG) BY MOUTH DAILY   Last time this was given:  100 mg on 10/21/2018  8:40 AM                                   melatonin 5 MG Caps   Take 2 capsules by mouth daily At dinnertime                                   metoprolol succinate 25 MG 24 hr tablet   Commonly known as:  TOPROL-XL   Take 1 tablet (25 mg) by mouth daily   Last time this was given:  25 mg on 10/21/2018  8:40 AM                                   paliperidone 9 MG 24 hr tablet   Commonly known as:  INVEGA   Take 1 tablet (9 mg) by mouth every morning   Last time this was given:  9 mg on 10/21/2018  8:40 AM                                   polyethylene glycol powder   Commonly known as:  MIRALAX/GLYCOLAX   TAKE 8.5 GRAMS (1/2 CAPFUL) BY MOUTH DAILY                                   ranitidine 300 MG tablet   Commonly known as:  ZANTAC   TAKE 1 TABLET (300MG) BY MOUTH AT BEDTIME                                    senna-docusate 8.6-50 MG per tablet   Commonly known as:  SENOKOT-S;PERICOLACE   Take 1 tablet by mouth 2 times daily as needed for constipation   Last time this was given:  1 tablet on 10/21/2018  8:40 AM                                   sertraline 100 MG tablet   Commonly known as:  ZOLOFT   Take 2 tablets (200 mg) by mouth daily   Last time this was given:  200 mg on 10/21/2018  8:39 AM                                   SUMAtriptan 25 MG tablet   Commonly known as:  IMITREX   Take 1-2 tablets (25-50 mg) by mouth at onset of headache for migraine May repeat in 2 hours. Max 8 tablets/24 hours.                                   topiramate 25 MG tablet   Commonly known as:  TOPAMAX   Take 2 tablets (50 mg) by mouth 2 times daily   Last time this was given:  50 mg on 10/21/2018  8:39 AM                                      vitamin D3 14879 UNITS capsule   Commonly known as:  CHOLECALCIFEROL   TAKE 1 CAPSULE (50,000 UNITS) MONTHLY   Notes to Patient:  Resume home regimen.          Resume home regimen.

## 2018-10-18 ENCOUNTER — OFFICE VISIT (OUTPATIENT)
Dept: FAMILY MEDICINE | Facility: CLINIC | Age: 58
End: 2018-10-18
Payer: MEDICARE

## 2018-10-18 DIAGNOSIS — Z53.9 NO SHOW: Primary | ICD-10-CM

## 2018-10-18 LAB
ANION GAP SERPL CALCULATED.3IONS-SCNC: 8 MMOL/L (ref 3–14)
BACTERIA SPEC CULT: NO GROWTH
BASOPHILS # BLD AUTO: 0 10E9/L (ref 0–0.2)
BASOPHILS NFR BLD AUTO: 0.2 %
BUN SERPL-MCNC: 15 MG/DL (ref 7–30)
CALCIUM SERPL-MCNC: 8.3 MG/DL (ref 8.5–10.1)
CHLORIDE SERPL-SCNC: 108 MMOL/L (ref 94–109)
CO2 SERPL-SCNC: 21 MMOL/L (ref 20–32)
CREAT SERPL-MCNC: 1.09 MG/DL (ref 0.52–1.04)
DIFFERENTIAL METHOD BLD: ABNORMAL
EOSINOPHIL # BLD AUTO: 0 10E9/L (ref 0–0.7)
EOSINOPHIL NFR BLD AUTO: 0.2 %
ERYTHROCYTE [DISTWIDTH] IN BLOOD BY AUTOMATED COUNT: 13.4 % (ref 10–15)
GFR SERPL CREATININE-BSD FRML MDRD: 52 ML/MIN/1.7M2
GLUCOSE BLDC GLUCOMTR-MCNC: 103 MG/DL (ref 70–99)
GLUCOSE BLDC GLUCOMTR-MCNC: 167 MG/DL (ref 70–99)
GLUCOSE BLDC GLUCOMTR-MCNC: 229 MG/DL (ref 70–99)
GLUCOSE BLDC GLUCOMTR-MCNC: 92 MG/DL (ref 70–99)
GLUCOSE SERPL-MCNC: 99 MG/DL (ref 70–99)
HCT VFR BLD AUTO: 28.1 % (ref 35–47)
HGB BLD-MCNC: 9 G/DL (ref 11.7–15.7)
IMM GRANULOCYTES # BLD: 0.1 10E9/L (ref 0–0.4)
IMM GRANULOCYTES NFR BLD: 0.6 %
LACTATE BLD-SCNC: 0.5 MMOL/L (ref 0.7–2)
LYMPHOCYTES # BLD AUTO: 2.6 10E9/L (ref 0.8–5.3)
LYMPHOCYTES NFR BLD AUTO: 15 %
Lab: NORMAL
MAGNESIUM SERPL-MCNC: 1.7 MG/DL (ref 1.6–2.3)
MCH RBC QN AUTO: 30.9 PG (ref 26.5–33)
MCHC RBC AUTO-ENTMCNC: 32 G/DL (ref 31.5–36.5)
MCV RBC AUTO: 97 FL (ref 78–100)
MONOCYTES # BLD AUTO: 1.5 10E9/L (ref 0–1.3)
MONOCYTES NFR BLD AUTO: 8.6 %
NEUTROPHILS # BLD AUTO: 13.3 10E9/L (ref 1.6–8.3)
NEUTROPHILS NFR BLD AUTO: 75.4 %
NRBC # BLD AUTO: 0 10*3/UL
NRBC BLD AUTO-RTO: 0 /100
PLATELET # BLD AUTO: 180 10E9/L (ref 150–450)
POTASSIUM SERPL-SCNC: 3.6 MMOL/L (ref 3.4–5.3)
PROCALCITONIN SERPL-MCNC: 0.06 NG/ML
RBC # BLD AUTO: 2.91 10E12/L (ref 3.8–5.2)
SODIUM SERPL-SCNC: 137 MMOL/L (ref 133–144)
SPECIMEN SOURCE: NORMAL
WBC # BLD AUTO: 17.6 10E9/L (ref 4–11)

## 2018-10-18 PROCEDURE — 99233 SBSQ HOSP IP/OBS HIGH 50: CPT | Performed by: INTERNAL MEDICINE

## 2018-10-18 PROCEDURE — 36415 COLL VENOUS BLD VENIPUNCTURE: CPT | Performed by: INTERNAL MEDICINE

## 2018-10-18 PROCEDURE — A9270 NON-COVERED ITEM OR SERVICE: HCPCS | Mod: GY | Performed by: INTERNAL MEDICINE

## 2018-10-18 PROCEDURE — 25000128 H RX IP 250 OP 636: Performed by: INTERNAL MEDICINE

## 2018-10-18 PROCEDURE — 25000131 ZZH RX MED GY IP 250 OP 636 PS 637: Mod: GY | Performed by: INTERNAL MEDICINE

## 2018-10-18 PROCEDURE — 25000125 ZZHC RX 250: Performed by: INTERNAL MEDICINE

## 2018-10-18 PROCEDURE — 00000146 ZZHCL STATISTIC GLUCOSE BY METER IP

## 2018-10-18 PROCEDURE — 83735 ASSAY OF MAGNESIUM: CPT | Performed by: INTERNAL MEDICINE

## 2018-10-18 PROCEDURE — 94640 AIRWAY INHALATION TREATMENT: CPT

## 2018-10-18 PROCEDURE — 25000132 ZZH RX MED GY IP 250 OP 250 PS 637: Mod: GY | Performed by: INTERNAL MEDICINE

## 2018-10-18 PROCEDURE — 99207 ZZC NO SHOW FOR SCHEDULED APPT: CPT | Performed by: NURSE PRACTITIONER

## 2018-10-18 PROCEDURE — 83605 ASSAY OF LACTIC ACID: CPT | Performed by: INTERNAL MEDICINE

## 2018-10-18 PROCEDURE — 40000275 ZZH STATISTIC RCP TIME EA 10 MIN

## 2018-10-18 PROCEDURE — 85025 COMPLETE CBC W/AUTO DIFF WBC: CPT | Performed by: INTERNAL MEDICINE

## 2018-10-18 PROCEDURE — 12000000 ZZH R&B MED SURG/OB

## 2018-10-18 PROCEDURE — 80048 BASIC METABOLIC PNL TOTAL CA: CPT | Performed by: INTERNAL MEDICINE

## 2018-10-18 RX ADMIN — INSULIN ASPART 5 UNITS: 100 INJECTION, SOLUTION INTRAVENOUS; SUBCUTANEOUS at 18:03

## 2018-10-18 RX ADMIN — ALBUTEROL SULFATE 2.5 MG: 2.5 SOLUTION RESPIRATORY (INHALATION) at 22:54

## 2018-10-18 RX ADMIN — METOPROLOL SUCCINATE 25 MG: 25 TABLET, EXTENDED RELEASE ORAL at 10:57

## 2018-10-18 RX ADMIN — ACETAMINOPHEN 650 MG: 325 TABLET, FILM COATED ORAL at 10:45

## 2018-10-18 RX ADMIN — SODIUM CHLORIDE: 9 INJECTION, SOLUTION INTRAVENOUS at 09:07

## 2018-10-18 RX ADMIN — GABAPENTIN 600 MG: 300 CAPSULE ORAL at 21:37

## 2018-10-18 RX ADMIN — SODIUM CHLORIDE: 9 INJECTION, SOLUTION INTRAVENOUS at 17:58

## 2018-10-18 RX ADMIN — BENZONATATE 100 MG: 100 CAPSULE ORAL at 03:16

## 2018-10-18 RX ADMIN — ASPIRIN 81 MG: 81 TABLET, COATED ORAL at 10:01

## 2018-10-18 RX ADMIN — INSULIN GLARGINE 30 UNITS: 100 INJECTION, SOLUTION SUBCUTANEOUS at 21:39

## 2018-10-18 RX ADMIN — GABAPENTIN 600 MG: 300 CAPSULE ORAL at 15:58

## 2018-10-18 RX ADMIN — TOPIRAMATE 50 MG: 25 TABLET, FILM COATED ORAL at 10:01

## 2018-10-18 RX ADMIN — BENZTROPINE MESYLATE 0.5 MG: 0.5 TABLET ORAL at 10:01

## 2018-10-18 RX ADMIN — SERTRALINE HYDROCHLORIDE 200 MG: 100 TABLET ORAL at 10:01

## 2018-10-18 RX ADMIN — CEFTRIAXONE SODIUM 2 G: 2 INJECTION, POWDER, FOR SOLUTION INTRAMUSCULAR; INTRAVENOUS at 17:57

## 2018-10-18 RX ADMIN — AZITHROMYCIN MONOHYDRATE 500 MG: 500 INJECTION, POWDER, LYOPHILIZED, FOR SOLUTION INTRAVENOUS at 19:14

## 2018-10-18 RX ADMIN — BENZTROPINE MESYLATE 0.5 MG: 0.5 TABLET ORAL at 21:37

## 2018-10-18 RX ADMIN — GABAPENTIN 600 MG: 300 CAPSULE ORAL at 10:01

## 2018-10-18 RX ADMIN — BENZONATATE 100 MG: 100 CAPSULE ORAL at 19:20

## 2018-10-18 RX ADMIN — TOPIRAMATE 50 MG: 25 TABLET, FILM COATED ORAL at 21:37

## 2018-10-18 ASSESSMENT — ENCOUNTER SYMPTOMS
HEADACHES: 0
HEMATURIA: 0
BACK PAIN: 1
COUGH: 1
DIZZINESS: 0
SORE THROAT: 0
NECK STIFFNESS: 0

## 2018-10-18 ASSESSMENT — ACTIVITIES OF DAILY LIVING (ADL)
ADLS_ACUITY_SCORE: 9

## 2018-10-18 NOTE — PLAN OF CARE
Problem: Patient Care Overview  Goal: Plan of Care/Patient Progress Review  Outcome: No Change  Patient admitted for possible PNA, sepsis  +cough, tessalon given  Tmax 100.5, not diaphoretic  Assist x 1 with gait belt to bathroom   ml/hr via PIV  Rocephin, Zithromax Q 24 hours  Maintained droplet precautions

## 2018-10-18 NOTE — PLAN OF CARE
Problem: Patient Care Overview  Goal: Plan of Care/Patient Progress Review  Outcome: No Change  A&Ox4, Assist x1 w/ gait belt, tolerating diet, heart sounds- WNL, lungs- clear, cough, bowel sounds- active, voiding without difficulty, incontinence of stool x2, PIV NS running, denies pain, blood sugar 98.

## 2018-10-18 NOTE — PLAN OF CARE
Problem: Patient Care Overview  Goal: Plan of Care/Patient Progress Review    PT:  Orders received.  Pt admitted with fever, chills, respiratory symptoms with cough, shortness of breath, leukocytosis, tachycardia, lactic acidosis with suspected early sepsis likely from possible underlying pneumonia.  Will allow 24-48 hours medical management to stabilize condition.  Rescheduled for PT eval on 10/19 to optimize tolerance with therapy assessment.

## 2018-10-18 NOTE — PROGRESS NOTES
[facility]  Hospitalist Progress Note  Manuelito Smith MD, MD 10/18/2018  (Text Page)  Reason for Stay (Diagnosis): Fever, cough, nasal congestion, shortness of breath         Assessment and Plan:      Nena Tang is a 58 year old female complex medical history currently living at a group home setting with known hypertension, CINDY noncompliant with appliance, CAD, dyslipidemia, prior polysubstance abuse but noted to be in remission, insulin requiring diabetes mellitus who presented earlier in the emergency room due to numerous complaints of worsening coughing spells, decrease in appetite, fever and chills over the past 2 days duration     1.  Fever, chills, respiratory symptoms with cough, shortness of breath, leukocytosis, tachycardia, lactic acidosis with suspected early sepsis likely from possible underlying pneumonia.  -Chest x-ray showing no definitive infiltrates  -Current symptomatology and above findings highly suggestive of infectious process most likely respiratory tract in etiology.  -We will continue with IV antibiotics as started in the emergency room with Rocephin and Zithromax.  Pro-calcitonin though is low.  But leukocytosis is worsening.  -Infectious workup and cultures will be closely monitored and followed.   -As needed bronchodilators.  As needed antitussive.  -As needed APAP for fever spikes.     2.  Insulin requiring diabetes mellitus-we will resume her home regimen if she continues to demonstrate tolerance of diabetic diet.  -Decrease her basal insulin and prandial short acting insulin due to decreased oral intake.     3.  CAD-resume home regimen of aspirin and statins, metoprolol continued.     4.  Hypertension-on ARB.     5.  History of migraine headaches, will resume Imitrex and Topamax     6.  History of schizoaffective disorder, depressive type and PTSD-we will resume her regimen for days once reconciled.     Code status: Full as per patient's instructions  Admit  to inpatient  Prophylaxis: Mechanical  Disposition: Back to group home likely needing at least 2 inpatient hospitalization days.            Interval History (Subjective):      Continuing care today.  Seen and examined.  Chart reviewed.  Case discussed with nursing care.  She is still feeling weak, still with intermittent coughing spells.  Had numerous fever spikes but decreasing in intensity.  Not much appetite but no reported nausea or vomiting.  No diarrhea.  No bleeding tendencies.         Physical Exam:      Last Vital Signs:  /49 (BP Location: Right arm)  Pulse 90  Temp 100.9  F (38.3  C) (Oral)  Resp 20  Ht 1.524 m (5')  Wt 108.9 kg (240 lb)  LMP 01/06/2015  SpO2 91%  BMI 46.87 kg/m2    I/O last 3 completed shifts:  In: 976 [P.O.:250; I.V.:726]  Out: 900 [Urine:900]  Wt Readings from Last 1 Encounters:   10/17/18 108.9 kg (240 lb)     Vitals:    10/17/18 1513 10/17/18 2136   Weight: 99.8 kg (220 lb) 108.9 kg (240 lb)       Constitutional: Awake, weak appearing, cooperative, no apparent distress   Respiratory:  Fair air entry, bilateral mild rhonchi, no wheezes   Cardiovascular: Regular rate and rhythm, normal S1 and S2, and no murmur noted   Abdomen: Normal bowel sounds, soft, non-distended, non-tender   Skin: No rashes, no cyanosis, dry to touch   Neuro: Alert and oriented x3, no weakness, slow and soft, spontaneous and coherent speech   Extremities: Nonpitting edema both lower extremities, normal range of motion   Other(s):  Depressed mood, not agitated       All other systems: Negative          Medications:      All current medications were reviewed with changes reflected in problem list.         Data:      All new lab and imaging data was reviewed.   Labs:    Recent Labs  Lab 10/17/18  1805 10/17/18  1619 10/17/18  1530   CULT PENDING No growth after 7 hours No growth after 7 hours       Recent Labs  Lab 10/18/18  0659 10/17/18  1531   WBC 17.6* 12.1*   HGB 9.0* 10.5*   HCT 28.1* 33.2*   MCV  97 98    212       Recent Labs  Lab 10/18/18  0659 10/17/18  1531    137   POTASSIUM 3.6 4.3   CHLORIDE 108 106   CO2 21 26   ANIONGAP 8 5   GLC 99 173*   BUN 15 15   CR 1.09* 1.04   GFRESTIMATED 52* 54*   GFRESTBLACK 62 66   ALLEN 8.3* 8.9   MAG 1.7  --    PROTTOTAL  --  8.0   ALBUMIN  --  3.7   BILITOTAL  --  0.2   ALKPHOS  --  95   AST  --  17   ALT  --  23     No results for input(s): SED, CRP in the last 168 hours.    Recent Labs  Lab 10/18/18  0902 10/18/18  0659 10/18/18  0153 10/17/18  2142 10/17/18  1531 10/17/18  1508   GLC  --  99  --   --  173*  --    BGM 92  --  167* 128*  --  172*     No results for input(s): INR in the last 168 hours.    Recent Labs  Lab 10/17/18  1805   COLOR Yellow   APPEARANCE Clear   URINEGLC Negative   URINEBILI Negative   URINEKETONE Negative   SG 1.016   UBLD Negative   URINEPH 6.0   PROTEIN Negative   NITRITE Negative   LEUKEST Negative   RBCU 0   WBCU <1      Imaging:   Results for orders placed or performed during the hospital encounter of 10/17/18   XR Chest 2 Views    Narrative    CHEST TWO VIEWS 10/17/2018 4:30 PM     HISTORY: Fever.       Impression    IMPRESSION: Allowing for suboptimal inspiration, the lungs appear  grossly clear. There may be mild vascular congestion in the upper  lobes. However, no pleural effusions are demonstrated.    ALONSO MONTEIRO MD     *Note: Due to a large number of results and/or encounters for the requested time period, some results have not been displayed. A complete set of results can be found in Results Review.

## 2018-10-18 NOTE — MR AVS SNAPSHOT
After Visit Summary   10/18/2018    Nena Tang    MRN: [id number]           Patient Information     Date Of Birth          1960        Visit Information        Provider Department      10/18/2018 9:00 AM Rowena Haas APRN CNP [facility]        Today's Diagnoses     NO SHOW    -  1       Follow-ups after your visit        Your next 10 appointments already scheduled     Oct 22, 2018 10:30 AM CDT   (Arrive by 10:15 AM)   CT LUMBAR SPINE W/O CONTRAST with [id number]   [facility] ([facility])    [address]  [facility] [id number] [phone]           How do I prepare for my exam? (Food and drink instructions) No Food and Drink Restrictions.  How do I prepare for my exam? (Other instructions) You do not need to do anything special to prepare for this exam. For a sinus scan: Use your nose spray (nasal decongestant spray) as directed.  What should I wear: Please wear loose clothing, such as a sweat suit or jogging clothes. Avoid snaps, zippers and other metal. We may ask you to undress and put on a hospital gown.  How long does the exam take: Most scans take less than 20 minutes.  What should I bring: Please bring any scans or X-rays taken at other hospitals, if similar tests were done. Also bring a list of your medicines, including vitamins, minerals and over-the-counter drugs. It is safest to leave personal items at home.  Do I need a : No  is needed.  What do I need to tell my doctor? Be sure to tell your doctor: * If you have any allergies. * If there s any chance you are pregnant. * If you are breastfeeding.  What should I do after the exam: No restrictions, You may resume normal activities.  What is this test: A CT (computed tomography) scan is a series of pictures that allows us to look inside your body. The scanner creates images of the body in cross sections, much like slices  of bread. This helps us see any problems more clearly.  Who should I call with questions: If you have any questions, please call the Imaging Department where you will have your exam. Directions, parking instructions, and other information is available on our website, [email or web address].            Oct 22, 2018  1:00 PM CDT   Ortho Treatment with Kiko Gallego, PT   [facility] ([facility])    [address] [address]   [phone]            Oct 29, 2018  1:00 PM CDT   Ortho Treatment with Kiko Gallego, PT   [facility] ([facility])    [address] [address]   [phone]            Nov 01, 2018  9:30 AM CDT   Return Visit with ART Arias CNP   [facility] ([facility])    [address]   [address] [place] [address] [phone]            Nov 01, 2018  9:30 AM CDT   Return Visit with Richardson Lopez [facility] ([facility])    [address]   [address]  [facility] [id number] [phone]            Nov 01, 2018  9:30 AM CDT   Office Visit with Eugenia De Jesus [facility] [place] ([facility])    [address] [place] [address]  [address] [place] [address] [phone]           Bring a current list of meds and any records pertaining to this visit. For Physicals, please bring immunization records and any forms needing to be filled out. Please arrive 10 minutes early to complete paperwork.            Nov 30, 2018  1:30 PM CST   Return Visit with Kraig Pedersen MD   [facility] ([facility])    [address]  [facility] [id number] [phone]            Mar 14, 2019  3:15 PM CDT   RETURN RETINA with  Tiburcio Chacon MD   [facility] ([facility])    [address]  [address]  [facility] [address] [phone]              Future tests that were ordered for you today     Open Standing Orders        Priority Remaining Interval Expires Ordered    Glucose monitor nursing POCT Routine [id number] PRN  10/17/2018    Glucose monitor nursing POCT Routine [id number] PRN  10/17/2018    Glucose monitor nursing POCT- IF PO (eating meals or on bolus enteral feedings), within 30 minutes prior to each meal and at bedtime. Routine 116/121 4 TIMES DAILY BEFORE MEALS & AT BEDTIME  10/17/2018    Potassium Routine 100/100 CONDITIONAL (SPECIFY)  10/17/2018    Magnesium Routine 100/100 CONDITIONAL (SPECIFY)  10/17/2018    Oxygen: Nasal cannula, Oxygen mask STAT [id number] CONTINUOUS  10/17/2018            Who to contact     If you have questions or need follow up information about today's clinic visit or your schedule please contact [facility] directly at [phone].  Normal or non-critical lab and imaging results will be communicated to you by [employer], letter or phone within 4 business days after the clinic has received the results. If you do not hear from us within 7 days, please contact the clinic through [employer] or phone. If you have a critical or abnormal lab result, we will notify you by phone as soon as possible.  Submit refill requests through [employer] or call your pharmacy and they will forward the refill request to us. Please allow 3 business days for your refill to be completed.          Additional Information About Your Visit        [employer] Information     [employer] gives you secure access to your electronic health record. If you see a primary care provider, you can also send messages to your care team and make appointments. If you have questions, please call your primary care clinic.  If you do not have a primary care  provider, please call [phone] and they will assist you.        Care EveryWhere ID     This is your Care EveryWhere ID. This could be used by other organizations to access your [place] medical records  [id number]        Your Vitals Were     Last Period                   01/06/2015            Blood Pressure from Last 3 Encounters:   10/18/18 108/49   10/05/18 130/78   09/10/18 135/72    Weight from Last 3 Encounters:   10/17/18 240 lb (108.9 kg)   10/05/18 238 lb (108 kg)   09/10/18 237 lb 8 oz (107.7 kg)              Today, you had the following     No orders found for display         Today's Medication Changes      Notice     This visit is during an admission. Changes to the med list made in this visit will be reflected in the After Visit Summary of the admission.             Primary Care Provider Office Phone # Fax #    Rowena ART Blanton [phone] [address] [facility] [address]  [facility] [address]        Goals        General    Medical (pt-stated)     Notes - Note created  7/7/2016  9:15 AM by Chelsea Pulido, RN    Get blood sugars under control    Improve medication compliance    As of today's date 7/7/2016 goal is met at 0 - 25%.   Goal Status:  Active          Equal Access to Services     KIMBERLY GARRIDO : Hadii samira ku hadasho Soomaali, waaxda luqadaha, qaybta kaalmada adeegyada, lorena martins. So [facility] [phone].    ATENCIÓN: Si habla español, tiene a trinh disposición servicios gratuitos de asistencia lingüística. Llame al [phone].    We comply with applicable federal civil rights laws and [place] laws. We do not discriminate on the basis of race, color, national origin, age, disability, sex, sexual orientation, or gender identity.            Thank you!     Thank you for choosing [facility]  for your care. Our goal is always to provide you with excellent care. Hearing back from our patients is one way we can  continue to improve our services. Please take a few minutes to complete the written survey that you may receive in the mail after your visit with us. Thank you!             Your Updated Medication List - Protect others around you: Learn how to safely use, store and throw away your medicines at www.disposemymeds.org.      Notice     This visit is during an admission. Changes to the med list made in this visit will be reflected in the After Visit Summary of the admission.

## 2018-10-18 NOTE — CONSULTS
Care Transition Initial Assessment -   Reason For Consult: discharge planning  Met with: Patient    Active Problems:    Sepsis (H)       DATA  Lives With: facility resident  Living Arrangements: group home- [facility]   Description of Support System: Supportive, Involved  Who is your support system?: Children, Facility resident(s)/Staff  Support Assessment: Adequate family and caregiver support, Adequate social supports. PT has support from her staff, she has an adult son, an [employer] worker she sees every two week. MH casemgr through [facility] and a [employer] worker, although pt can not remember her name   Identified issues/concerns regarding health management:   PT admitted with Sepsis due to possible PNA. Pt has H.O  MH issues that are stable at this time.          Other Resources: [facility]. Gisselle JIN/ Zheng  [phone] or [phone]   Uses [facility] for Pharmacy    Quality Of Family Relationships: supportive    ASSESSMENT  Cognitive Status:  awake, alert and oriented  Concerns to be addressed: Met with pt who was calling her primary care MD to cancel her appt. Pt Uses a cane for mobility. She attends a drop in center daily and like where she lives.   PT has no concerns about return to home  Called  and left Vm message to identify if they have any concerns and to confirm if they can provide transport home once pt is able       PLAN  Following. Left VM message for Zheng [phone]

## 2018-10-18 NOTE — PROGRESS NOTES
Spoke w/Zheng at the group home--discussed setting up the hospital follow-up appt w/in 7 days of discharge--he states they will set up the appt after pt discharges because they have to transport her and can't know at this time when would be a good time.  Did go over the PNA action plan with the pt.

## 2018-10-18 NOTE — PHARMACY-ADMISSION MEDICATION HISTORY
Admission medication history interview status for this patient is complete. See [facility] admission navigator for allergy information, prior to admission medications and immunization status.     Medication history interview source(s):Caregiver Alva Palencia [phone]  Medication history resources (including written lists, pill bottles, clinic record):None    Changes made to PTA medication list:  Added: none  Deleted: compazine, lotrimin cream, benadryl  Changed: none    Actions taken by pharmacist (provider contacted, etc):called and spoke with caretaker    Additional medication history information:None    Medication reconciliation/reorder completed by provider prior to medication history? Yes    For patients on insulin therapy: Yes--hard to continue over the phone (Yes/No)   Lantus/levemir/NPH/Mix 70/30 dose: ___ in AM/PM or twice daily   Sliding scale Novolog Y/N   If Yes, do you have a baseline novolog pre-meal dose: ______units with meals   Patients eat three meals a day: Y/N ---  How many episodes of hypoglycemia (low blood glucose) do you have weekly: ---   How many missed doses do you have a week: ---  How many times do you check your blood glucose per day: ---  Any Barriers to therapy: cost of medications/comfortable with giving injections (if applicable)/ comfortable and confident with current diabetes regimen ---      Prior to Admission medications    Medication Sig Last Dose Taking? Auth Provider   acetaminophen (TYLENOL) 500 MG tablet Take 2 tablets (1,000 mg) by mouth 3 times daily as needed for mild pain 10/16/2018 at Unknown time Yes Rowena Haas APRN CNP   albuterol (PROAIR HFA/PROVENTIL HFA/VENTOLIN HFA) 108 (90 Base) MCG/ACT Inhaler Inhale 2 puffs into the lungs every 6 hours as needed for shortness of breath / dyspnea or wheezing  Yes Wendy Tang APRN CNP   aspirin 81 MG EC tablet TAKE 1 TABLET (81MG) BY MOUTH DAILY 10/17/2018 at Unknown time Yes Rowena Haas APRN CNP    atorvastatin (LIPITOR) 80 MG tablet TAKE 1 TABLET (80MG) BY MOUTH DAILY 10/17/2018 at Unknown time Yes Rowena Haas APRN CNP   benztropine (COGENTIN) 0.5 MG tablet Take 1 tablet (0.5 mg) by mouth 2 times daily 10/17/2018 at am Yes Kraig Pedersen MD   calcium carbonate (OS-ALLEN 600 MG Craig. CA) 1500 (600 CA) MG tablet Take 1 tablet (1,500 mg) by mouth daily 10/17/2018 at am Yes Rowena Haas APRN CNP   dulaglutide (TRULICITY) 1.5 MG/0.5ML pen Inject 1.5 mg Subcutaneous every 7 days Past Week at 10-15-18 Yes Rowena Haas APRN CNP   fluticasone (FLONASE) 50 MCG/ACT spray Spray 1-2 sprays into both nostrils daily 10/17/2018 at Unknown time Yes Adriana Georges APRN CNP   gabapentin (NEURONTIN) 300 MG capsule TAKE 2 CAPSULES (600MG) BY MOUTH THREE TIMES A DAY 10/17/2018 at Unknown time Yes Rowena Haas APRN CNP   glucose 4 G CHEW chewable tablet Take 2 every 15 minutes for blood sugar <70mg/dL. Recheck blood sugar every 15 minutes until above 70mg/dL, then eat a substantial meal.  Yes Rowena Haas APRN CNP   ibuprofen (ADVIL,MOTRIN) 600 MG tablet Take 1 tablet (600 mg) by mouth every 4 hours as needed for moderate pain  Yes Jud Real PA-C   insulin aspart (NOVOLOG FLEXPEN) 100 UNIT/ML injection Inject 20 Units Subcutaneous 3 times daily (with meals) Once daily, can add additional 5 units if BGs are >500mg/dL.  Yes Rowena Haas APRN CNP   insulin glargine (LANTUS SOLOSTAR) 100 UNIT/ML pen Inject 48 Units Subcutaneous At Bedtime 10/16/2018 at Unknown time Yes Rowena Haas APRN CNP   ipratropium - albuterol 0.5 mg/2.5 mg/3 mL (DUONEB) 0.5-2.5 (3) MG/3ML neb solution Take 1 vial (3 mLs) by nebulization every 6 hours as needed for shortness of breath / dyspnea or wheezing  Yes Adriana Georges APRN CNP   losartan (COZAAR) 100 MG tablet TAKE 1 TABLET (100MG) BY MOUTH DAILY 10/17/2018 at Unknown time Yes Rowena Haas APRN CNP   melatonin 5 MG CAPS Take 2 capsules  by mouth daily At dinnertime 10/16/2018 at Unknown time Yes Kraig Pedersen MD   metoprolol succinate (TOPROL-XL) 25 MG 24 hr tablet Take 1 tablet (25 mg) by mouth daily 10/17/2018 at Unknown time Yes Rowena Haas APRN CNP   paliperidone (INVEGA) 9 MG 24 hr tablet Take 1 tablet (9 mg) by mouth every morning 10/17/2018 at Unknown time Yes Kraig Pedersen MD   polyethylene glycol (MIRALAX/GLYCOLAX) powder TAKE 8.5 GRAMS (1/2 CAPFUL) BY MOUTH DAILY 10/17/2018 at Unknown time Yes Rowena Haas APRN CNP   ranitidine (ZANTAC) 300 MG tablet TAKE 1 TABLET (300MG) BY MOUTH AT BEDTIME 10/16/2018 at Unknown time Yes Rowena Haas APRN CNP   senna-docusate (SENOKOT-S;PERICOLACE) 8.6-50 MG per tablet Take 1 tablet by mouth 2 times daily as needed for constipation  Yes Kraig Pedersen MD   sertraline (ZOLOFT) 100 MG tablet Take 2 tablets (200 mg) by mouth daily 10/17/2018 at Unknown time Yes Kraig Pedersen MD   SUMAtriptan (IMITREX) 25 MG tablet Take 1-2 tablets (25-50 mg) by mouth at onset of headache for migraine May repeat in 2 hours. Max 8 tablets/24 hours.  Yes Rowena Haas APRN CNP   topiramate (TOPAMAX) 25 MG tablet Take 2 tablets (50 mg) by mouth 2 times daily 10/17/2018 at am Yes Debbie Germain PA-C   vitamin D3 (CHOLECALCIFEROL) 44328 UNITS capsule TAKE 1 CAPSULE (50,000 UNITS) MONTHLY Past Week at 10-15-18 Yes Rowena Haas APRN CNP   blood glucose monitoring (ONE TOUCH DELICA) lancets Use to test blood sugar 2 times daily or as directed.  Ok to substitute alternative if insurance prefers.   Rowena Haas APRN CNP   blood glucose monitoring (ONETOUCH VERIO IQ) test strip Use to test blood sugar 4 times daily .  Ok to substitute alternative if insurance prefers.   Haas, Rowena N, APRN CNP   insulin pen needle (NOVOFINE 30) 30G X 8 MM USE 4 DAILY OR AS DIRECTED   Rowena Haas, ART CNP

## 2018-10-18 NOTE — H&P
[facility]  Hospitalist Admission Note  Name: Nena Tang    MRN: [id number]  YOB: 1960    Age: 58 year old  Date of admission: 10/17/2018  Primary care provider: Rowena Haas            Assessment and Plan:   Nena Tang is a 58 year old female complex medical history currently living at a group home setting with known hypertension, CINDY noncompliant with appliance, CAD, dyslipidemia, prior polysubstance abuse but noted to be in remission, insulin requiring diabetes mellitus who presented earlier in the emergency room due to numerous complaints of worsening coughing spells, decrease in appetite, fever and chills over the past 2 days duration    1.  Fever, chills, respiratory symptoms with cough, shortness of breath, leukocytosis, tachycardia, lactic acidosis with suspected early sepsis likely from possible underlying pneumonia.  -Chest x-ray showing no definitive infiltrates  -Current symptomatology and above findings highly suggestive of infectious process most likely respiratory tract in etiology.  -We will continue with IV antibiotics as started in the emergency room with Rocephin and Zithromax.  -Infectious workup and cultures will be closely monitored and followed.  Added pro-calcitonin levels.  -As needed bronchodilators.  As needed antitussive.  -As needed APAP for fever spikes.    2.  Insulin requiring diabetes mellitus-we will resume her home regimen if she continues to demonstrate tolerance of diabetic diet.    3.  CAD-resume home regimen of aspirin and statins, metoprolol continued.    4.  Hypertension-on ARB.    5.  History of migraine headaches, will resume Imitrex and Topamax    6.  History of schizoaffective disorder, depressive type and PTSD-we will resume her regimen for days once reconciled.    Code status: Full as per patient's instructions  Admit to inpatient  Prophylaxis: Mechanical  Disposition: Back to group home likely needing at least 2 inpatient  hospitalization days.          Chief Complaint:   Cough, decreased appetite, fever, chills of at least 2 days duration       Source of Information:   Patient with poor to fair reliability.  Discussion with ED physician  Review of E chart records         History of Present Illness:   Nena Tang is a 58 year old female complex medical history currently living at a group home setting with known hypertension, CINDY noncompliant with appliance, CAD, dyslipidemia, prior polysubstance abuse but noted to be in remission, insulin requiring diabetes mellitus who presented earlier in the emergency room due to numerous complaints of worsening coughing spells, decrease in appetite, fever and chills over the past 2 days duration.  This was accompanied with increasing shortness of breath but denies any chest pain, palpitations, vomiting spells, diarrhea or any bleeding tendencies.  She stated that she is not aware of any other residents in the group home having similar symptomatology.  She denies any recent travel, or any recent visitors with similar illness.    It was noted that during EMS evaluation she has a decreased oxygen levels at 89% but was not requiring any oxygen supplementation upon arrival in the emergency room and maintaining good amount of oxygen levels.  Her hemodynamics were stable.  However she was exhibiting high fevers with T-max 103 degrees of Fahrenheit.  Infectious workup ensued with no obvious etiology of her seen but has significant leukocytosis with accompanying lactic acidosis.  No definitive infiltrate seen in the chest x-ray.  She was also stating some frequency but urine analysis showed negative for any signs of infection.    Given her current fever spikes and numerous symptomatology suggestive of respiratory tract infection she was started with intravenous antibiotics and due to concerns of early sepsis she was referred to us for further management and care hence this hospitalization.    During  the time of my examination she is found sitting on the hospital chair and started to feel a little better and denies any new complaints.            Past Medical History:     Past Medical History:   Diagnosis Date     CAD (coronary artery disease)     5/2014 cath, nonbostructive stenosis to LAD, RCA.     Chronic low back pain 1/22/2013     Cocaine abuse, in remission (H)      Fecal urgency 3/8/2012     History of heroin abuse      Hyperlipidemia LDL goal <100 10/31/2010     Hypertension 7/29/2013     Illiterate 8/30/2011     Irritable bowel syndrome      Left cataract      Migraine 4/19/2012     Migraine headache 4/22/2013     Moderate major depression (H) 6/8/2011     Noncompliance with medication regimen 6/8/2011     Obesity      CINDY (obstructive sleep apnea) 3/8/2012    uses CPAP     Osteopenia 10/7/2009     Schizoaffective disorder, depressive type (H) 2/25/2013     Suicidal intent 10/2/2013     Takotsubo cardiomyopathy      Type 2 diabetes mellitus (H) 8/30/2011     Uterine cancer (H) 1983     Verbal auditory hallucination 10/4/2012             Past Surgical History:     Past Surgical History:   Procedure Laterality Date     C OOPHORECTORMY FOR MALIG, W/BX  1983    UTERINE     CATARACT IOL, RT/LT Bilateral 2017     COLONOSCOPY N/A 3/16/2017    Procedure: COLONOSCOPY;  Surgeon: Traci Gonzalez MD;  Location:  GI     Coronary CTA  5/21/2014     HYSTERECTOMY  1983    uterine cancer yearly pap's per provider.     LAPAROSCOPIC CHOLECYSTECTOMY  2008     PHACOEMULSIFICATION CLEAR CORNEA WITH STANDARD INTRAOCULAR LENS IMPLANT Left 5/5/2017    Procedure: PHACOEMULSIFICATION CLEAR CORNEA WITH STANDARD INTRAOCULAR LENS IMPLANT;  LEFT EYE PHACOEMULSIFICATION CLEAR CORNEA WITH STANDARD INTRAOCULAR LENS IMPLANT ;  Surgeon: Tyra Diaz MD;  Location: [facility]     PHACOEMULSIFICATION CLEAR CORNEA WITH STANDARD INTRAOCULAR LENS IMPLANT Right 6/30/2017    Procedure: PHACOEMULSIFICATION CLEAR CORNEA WITH STANDARD  INTRAOCULAR LENS IMPLANT;  RIGHT EYE PHACOEMULSIFICATION CLEAR CORNEA WITH STANDARD INTRAOCULAR LENS IMPLANT;  Surgeon: Tyra Diaz MD;  Location:  [facility]     RELEASE TRIGGER FINGER  10/11/2012    Left thumb. Procedure: RELEASE TRIGGER FINGER;  LEFT THUMB TRIGGER RELEASE;  Surgeon: Tay Langley MD;  Location:  [facility]     RELEASE TRIGGER FINGER Right 12/26/2016    Procedure: RELEASE TRIGGER FINGER;  Surgeon: Albino Castañeda MD;  Location:  OR             Social History:     Social History   Substance Use Topics     Smoking status: Never Smoker     Smokeless tobacco: Never Used     Alcohol use No      Comment: last month             Family History:   Family history was fully reviewed and non-contributory in this case.         Allergies:     Allergies   Allergen Reactions     Imidazole Antifungals Hives     Tolerates diflucan     Ketoprofen Itching     Pruritis to topical     Lisinopril Hives     Metformin Other (See Comments)     Patient hospitalized for lactic acidosis - admitting provider suspectd caused by metformin     Metronidazole Hives     Posaconazole Hives     Tolerates diflucan             Medications:     Prior to Admission medications    Medication Sig Last Dose Taking? Auth Provider   acetaminophen (TYLENOL) 500 MG tablet Take 2 tablets (1,000 mg) by mouth 3 times daily as needed for mild pain 10/16/2018 at Unknown time Yes Rowena Haas APRN CNP   albuterol (PROAIR HFA/PROVENTIL HFA/VENTOLIN HFA) 108 (90 Base) MCG/ACT Inhaler Inhale 2 puffs into the lungs every 6 hours as needed for shortness of breath / dyspnea or wheezing  Yes Wendy Tang APRN CNP   aspirin 81 MG EC tablet TAKE 1 TABLET (81MG) BY MOUTH DAILY 10/17/2018 at Unknown time Yes Rowena Haas APRN CNP   atorvastatin (LIPITOR) 80 MG tablet TAKE 1 TABLET (80MG) BY MOUTH DAILY 10/17/2018 at Unknown time Yes Rowena Haas APRN CNP   benztropine (COGENTIN) 0.5 MG tablet Take 1 tablet (0.5 mg) by mouth 2  times daily 10/17/2018 at am Yes Kraig Pedersen MD   calcium carbonate (OS-ALLEN 600 MG [place]. [place]) 1500 (600 CA) MG tablet Take 1 tablet (1,500 mg) by mouth daily 10/17/2018 at am Yes Rowena Haas APRN CNP   dulaglutide (TRULICITY) 1.5 MG/0.5ML pen Inject 1.5 mg Subcutaneous every 7 days Past Week at 10-15-18 Yes Rowena Haas APRN CNP   fluticasone (FLONASE) 50 MCG/ACT spray Spray 1-2 sprays into both nostrils daily 10/17/2018 at Unknown time Yes Adriana Georges APRN CNP   gabapentin (NEURONTIN) 300 MG capsule TAKE 2 CAPSULES (600MG) BY MOUTH THREE TIMES A DAY 10/17/2018 at Unknown time Yes Rowena Haas APRN CNP   glucose 4 G CHEW chewable tablet Take 2 every 15 minutes for blood sugar <70mg/dL. Recheck blood sugar every 15 minutes until above 70mg/dL, then eat a substantial meal.  Yes Rowena Haas APRN CNP   ibuprofen (ADVIL,MOTRIN) 600 MG tablet Take 1 tablet (600 mg) by mouth every 4 hours as needed for moderate pain  Yes Jud Real PA-C   insulin aspart (NOVOLOG FLEXPEN) 100 UNIT/ML injection Inject 20 Units Subcutaneous 3 times daily (with meals) Once daily, can add additional 5 units if BGs are >500mg/dL.  Yes Rowena Haas APRN CNP   insulin glargine (LANTUS SOLOSTAR) 100 UNIT/ML pen Inject 48 Units Subcutaneous At Bedtime 10/16/2018 at Unknown time Yes Rowena Haas APRN CNP   ipratropium - albuterol 0.5 mg/2.5 mg/3 mL (DUONEB) 0.5-2.5 (3) MG/3ML neb solution Take 1 vial (3 mLs) by nebulization every 6 hours as needed for shortness of breath / dyspnea or wheezing  Yes Adriana Georges APRN CNP   losartan (COZAAR) 100 MG tablet TAKE 1 TABLET (100MG) BY MOUTH DAILY 10/17/2018 at Unknown time Yes Rowena Haas APRN CNP   melatonin 5 MG CAPS Take 2 capsules by mouth daily At dinnertime 10/16/2018 at Unknown time Yes Kraig Pedersen MD   metoprolol succinate (TOPROL-XL) 25 MG 24 hr tablet Take 1 tablet (25 mg) by mouth daily 10/17/2018 at Unknown time Yes  Rowena Haas APRN CNP   paliperidone (INVEGA) 9 MG 24 hr tablet Take 1 tablet (9 mg) by mouth every morning 10/17/2018 at Unknown time Yes Kraig Pedersen MD   polyethylene glycol (MIRALAX/GLYCOLAX) powder TAKE 8.5 GRAMS (1/2 CAPFUL) BY MOUTH DAILY 10/17/2018 at Unknown time Yes Rowena Haas APRN CNP   ranitidine (ZANTAC) 300 MG tablet TAKE 1 TABLET (300MG) BY MOUTH AT BEDTIME 10/16/2018 at Unknown time Yes Rowena Haas APRN CNP   senna-docusate (SENOKOT-S;PERICOLACE) 8.6-50 MG per tablet Take 1 tablet by mouth 2 times daily as needed for constipation  Yes Kraig Pedersen MD   sertraline (ZOLOFT) 100 MG tablet Take 2 tablets (200 mg) by mouth daily 10/17/2018 at Unknown time Yes Kraig Pedersen MD   SUMAtriptan (IMITREX) 25 MG tablet Take 1-2 tablets (25-50 mg) by mouth at onset of headache for migraine May repeat in 2 hours. Max 8 tablets/24 hours.  Yes Rowena Haas APRN CNP   topiramate (TOPAMAX) 25 MG tablet Take 2 tablets (50 mg) by mouth 2 times daily 10/17/2018 at am Yes Debbie Germain PA-C   vitamin D3 (CHOLECALCIFEROL) 07678 UNITS capsule TAKE 1 CAPSULE (50,000 UNITS) MONTHLY Past Week at 10-15-18 Yes Rowena Haas APRN CNP             Review of Systems:   A Comprehensive greater than 10 system review of systems was carried out.  Pertinent positives and negatives are noted above.  Otherwise negative for contributory information.           Physical Exam:   Blood pressure 151/80, pulse 104, temperature 102.8  F (39.3  C), resp. rate 20, weight 99.8 kg (220 lb), last menstrual period 01/06/2015, SpO2 93 %.  Wt Readings from Last 1 Encounters:   10/17/18 99.8 kg (220 lb)     Exam:  GENERAL: No apparent distress. Awake, alert, and fully oriented, obese  HEENT: Normocephalic, atraumatic. Extraocular movements intact.  CARDIOVASCULAR: Tachycardic rate at 106 bpm and rhythm without murmurs or rubs. No JVD  PULMONARY: Fair air entry, no wheezes, mild rhonchi, no stridor, no  crackles  ABDOMINAL: Soft, non-tender, non-distended. Bowel sounds normoactive. No hepatosplenomegaly.  EXTREMITIES: No cyanosis or clubbing. Nonpitting edema both lower extremities  NEUROLOGICAL: CN 2-12 grossly intact, awake and alert x3, spontaneous and coherent speech. no focal neurological deficits.  DERMATOLOGICAL: No rash, ulcer, ecchymoses, jaundice.  Psych: not agitation, not combative, pleasant mood  Lymph nodes: no obvious palpable  cervical or axillary lymphadenopathy         Data:   EKG:    No new EKG seen in epic  Imaging:  Results for orders placed or performed during the hospital encounter of 10/17/18   XR Chest 2 Views    Narrative    CHEST TWO VIEWS 10/17/2018 4:30 PM     HISTORY: Fever.       Impression    IMPRESSION: Allowing for suboptimal inspiration, the lungs appear  grossly clear. There may be mild vascular congestion in the upper  lobes. However, no pleural effusions are demonstrated.    ALONSO MONTEIRO MD     *Note: Due to a large number of results and/or encounters for the requested time period, some results have not been displayed. A complete set of results can be found in Results Review.       Labs:  No results for input(s): CULT in the last 168 hours.    Recent Labs  Lab 10/17/18  1531   WBC 12.1*   HGB 10.5*   HCT 33.2*   MCV 98          Recent Labs  Lab 10/17/18  1531      POTASSIUM 4.3   CHLORIDE 106   CO2 26   ANIONGAP 5   *   BUN 15   CR 1.04   GFRESTIMATED 54*   GFRESTBLACK 66   ALLEN 8.9   PROTTOTAL 8.0   ALBUMIN 3.7   BILITOTAL 0.2   ALKPHOS 95   AST 17   ALT 23     No results for input(s): SED, CRP in the last 168 hours.    Recent Labs  Lab 10/17/18  1531 10/17/18  1508   *  --    BGM  --  172*     No results for input(s): INR in the last 168 hours.  No results for input(s): TROPONIN, TROPI, TROPR in the last 168 hours.    Invalid input(s): TROP, TROPONINIES    Recent Labs  Lab 10/17/18  1805   COLOR Yellow   APPEARANCE Clear   URINEGLC Negative    URINEBILI Negative   URINEKETONE Negative   SG 1.016   UBLD Negative   URINEPH 6.0   PROTEIN Negative   NITRITE Negative   LEUKEST Negative   RBCU 0   WBCU <1

## 2018-10-19 LAB
ANION GAP SERPL CALCULATED.3IONS-SCNC: 6 MMOL/L (ref 3–14)
BASOPHILS # BLD AUTO: 0 10E9/L (ref 0–0.2)
BASOPHILS NFR BLD AUTO: 0.2 %
BUN SERPL-MCNC: 12 MG/DL (ref 7–30)
CALCIUM SERPL-MCNC: 8 MG/DL (ref 8.5–10.1)
CHLORIDE SERPL-SCNC: 113 MMOL/L (ref 94–109)
CO2 SERPL-SCNC: 20 MMOL/L (ref 20–32)
CREAT SERPL-MCNC: 0.89 MG/DL (ref 0.52–1.04)
DIFFERENTIAL METHOD BLD: ABNORMAL
EOSINOPHIL # BLD AUTO: 0.2 10E9/L (ref 0–0.7)
EOSINOPHIL NFR BLD AUTO: 1.5 %
ERYTHROCYTE [DISTWIDTH] IN BLOOD BY AUTOMATED COUNT: 13.4 % (ref 10–15)
GFR SERPL CREATININE-BSD FRML MDRD: 66 ML/MIN/1.7M2
GLUCOSE BLDC GLUCOMTR-MCNC: 107 MG/DL (ref 70–99)
GLUCOSE BLDC GLUCOMTR-MCNC: 109 MG/DL (ref 70–99)
GLUCOSE BLDC GLUCOMTR-MCNC: 192 MG/DL (ref 70–99)
GLUCOSE BLDC GLUCOMTR-MCNC: 84 MG/DL (ref 70–99)
GLUCOSE BLDC GLUCOMTR-MCNC: 92 MG/DL (ref 70–99)
GLUCOSE SERPL-MCNC: 84 MG/DL (ref 70–99)
HCT VFR BLD AUTO: 26.4 % (ref 35–47)
HGB BLD-MCNC: 8.3 G/DL (ref 11.7–15.7)
IMM GRANULOCYTES # BLD: 0.1 10E9/L (ref 0–0.4)
IMM GRANULOCYTES NFR BLD: 0.6 %
LYMPHOCYTES # BLD AUTO: 2 10E9/L (ref 0.8–5.3)
LYMPHOCYTES NFR BLD AUTO: 19 %
MCH RBC QN AUTO: 31 PG (ref 26.5–33)
MCHC RBC AUTO-ENTMCNC: 31.4 G/DL (ref 31.5–36.5)
MCV RBC AUTO: 99 FL (ref 78–100)
MONOCYTES # BLD AUTO: 0.9 10E9/L (ref 0–1.3)
MONOCYTES NFR BLD AUTO: 8.1 %
NEUTROPHILS # BLD AUTO: 7.5 10E9/L (ref 1.6–8.3)
NEUTROPHILS NFR BLD AUTO: 70.6 %
NRBC # BLD AUTO: 0 10*3/UL
NRBC BLD AUTO-RTO: 0 /100
PLATELET # BLD AUTO: 157 10E9/L (ref 150–450)
POTASSIUM SERPL-SCNC: 3.9 MMOL/L (ref 3.4–5.3)
RBC # BLD AUTO: 2.68 10E12/L (ref 3.8–5.2)
SODIUM SERPL-SCNC: 139 MMOL/L (ref 133–144)
WBC # BLD AUTO: 10.7 10E9/L (ref 4–11)

## 2018-10-19 PROCEDURE — 85025 COMPLETE CBC W/AUTO DIFF WBC: CPT | Performed by: INTERNAL MEDICINE

## 2018-10-19 PROCEDURE — 99232 SBSQ HOSP IP/OBS MODERATE 35: CPT | Performed by: INTERNAL MEDICINE

## 2018-10-19 PROCEDURE — A9270 NON-COVERED ITEM OR SERVICE: HCPCS | Mod: GY | Performed by: INTERNAL MEDICINE

## 2018-10-19 PROCEDURE — 36415 COLL VENOUS BLD VENIPUNCTURE: CPT | Performed by: INTERNAL MEDICINE

## 2018-10-19 PROCEDURE — 25000132 ZZH RX MED GY IP 250 OP 250 PS 637: Mod: GY | Performed by: INTERNAL MEDICINE

## 2018-10-19 PROCEDURE — 12000000 ZZH R&B MED SURG/OB

## 2018-10-19 PROCEDURE — 00000146 ZZHCL STATISTIC GLUCOSE BY METER IP

## 2018-10-19 PROCEDURE — 25000131 ZZH RX MED GY IP 250 OP 636 PS 637: Mod: GY | Performed by: INTERNAL MEDICINE

## 2018-10-19 PROCEDURE — 25000128 H RX IP 250 OP 636: Performed by: INTERNAL MEDICINE

## 2018-10-19 PROCEDURE — 80048 BASIC METABOLIC PNL TOTAL CA: CPT | Performed by: INTERNAL MEDICINE

## 2018-10-19 PROCEDURE — 40000893 ZZH STATISTIC PT IP EVAL DEFER

## 2018-10-19 PROCEDURE — 97161 PT EVAL LOW COMPLEX 20 MIN: CPT | Mod: GP

## 2018-10-19 PROCEDURE — 40000193 ZZH STATISTIC PT WARD VISIT

## 2018-10-19 RX ORDER — ATORVASTATIN CALCIUM 40 MG/1
80 TABLET, FILM COATED ORAL EVERY EVENING
Status: DISCONTINUED | OUTPATIENT
Start: 2018-10-19 | End: 2018-10-21 | Stop reason: HOSPADM

## 2018-10-19 RX ORDER — IPRATROPIUM BROMIDE AND ALBUTEROL SULFATE 2.5; .5 MG/3ML; MG/3ML
1 SOLUTION RESPIRATORY (INHALATION) EVERY 6 HOURS PRN
Status: DISCONTINUED | OUTPATIENT
Start: 2018-10-19 | End: 2018-10-21 | Stop reason: HOSPADM

## 2018-10-19 RX ORDER — FLUTICASONE PROPIONATE 50 MCG
1-2 SPRAY, SUSPENSION (ML) NASAL DAILY
Status: DISCONTINUED | OUTPATIENT
Start: 2018-10-19 | End: 2018-10-21 | Stop reason: HOSPADM

## 2018-10-19 RX ORDER — PALIPERIDONE 3 MG/1
9 TABLET, EXTENDED RELEASE ORAL EVERY MORNING
Status: DISCONTINUED | OUTPATIENT
Start: 2018-10-19 | End: 2018-10-21 | Stop reason: HOSPADM

## 2018-10-19 RX ORDER — AMOXICILLIN 250 MG
1 CAPSULE ORAL 2 TIMES DAILY PRN
Status: DISCONTINUED | OUTPATIENT
Start: 2018-10-19 | End: 2018-10-21 | Stop reason: HOSPADM

## 2018-10-19 RX ADMIN — INSULIN GLARGINE 30 UNITS: 100 INJECTION, SOLUTION SUBCUTANEOUS at 21:01

## 2018-10-19 RX ADMIN — SERTRALINE HYDROCHLORIDE 200 MG: 100 TABLET ORAL at 08:06

## 2018-10-19 RX ADMIN — SODIUM CHLORIDE: 9 INJECTION, SOLUTION INTRAVENOUS at 05:47

## 2018-10-19 RX ADMIN — GUAIFENESIN 10 ML: 100 SOLUTION ORAL at 02:19

## 2018-10-19 RX ADMIN — BENZONATATE 100 MG: 100 CAPSULE ORAL at 05:47

## 2018-10-19 RX ADMIN — GUAIFENESIN 10 ML: 100 SOLUTION ORAL at 08:14

## 2018-10-19 RX ADMIN — INSULIN ASPART 5 UNITS: 100 INJECTION, SOLUTION INTRAVENOUS; SUBCUTANEOUS at 18:13

## 2018-10-19 RX ADMIN — GUAIFENESIN 10 ML: 100 SOLUTION ORAL at 15:59

## 2018-10-19 RX ADMIN — PALIPERIDONE 9 MG: 3 TABLET, EXTENDED RELEASE ORAL at 10:57

## 2018-10-19 RX ADMIN — Medication 10 MG: at 19:09

## 2018-10-19 RX ADMIN — BENZTROPINE MESYLATE 0.5 MG: 0.5 TABLET ORAL at 08:07

## 2018-10-19 RX ADMIN — GABAPENTIN 600 MG: 300 CAPSULE ORAL at 08:06

## 2018-10-19 RX ADMIN — GABAPENTIN 600 MG: 300 CAPSULE ORAL at 21:00

## 2018-10-19 RX ADMIN — GABAPENTIN 600 MG: 300 CAPSULE ORAL at 15:48

## 2018-10-19 RX ADMIN — FLUTICASONE PROPIONATE 1 SPRAY: 50 SPRAY, METERED NASAL at 10:57

## 2018-10-19 RX ADMIN — TOPIRAMATE 50 MG: 25 TABLET, FILM COATED ORAL at 08:07

## 2018-10-19 RX ADMIN — ATORVASTATIN CALCIUM 80 MG: 40 TABLET, FILM COATED ORAL at 19:09

## 2018-10-19 RX ADMIN — BENZTROPINE MESYLATE 0.5 MG: 0.5 TABLET ORAL at 20:59

## 2018-10-19 RX ADMIN — LOSARTAN POTASSIUM 100 MG: 100 TABLET ORAL at 08:06

## 2018-10-19 RX ADMIN — METOPROLOL SUCCINATE 25 MG: 25 TABLET, EXTENDED RELEASE ORAL at 08:07

## 2018-10-19 RX ADMIN — ASPIRIN 81 MG: 81 TABLET, COATED ORAL at 08:06

## 2018-10-19 RX ADMIN — CEFTRIAXONE SODIUM 2 G: 2 INJECTION, POWDER, FOR SOLUTION INTRAMUSCULAR; INTRAVENOUS at 18:13

## 2018-10-19 RX ADMIN — ACETAMINOPHEN 650 MG: 325 TABLET, FILM COATED ORAL at 19:56

## 2018-10-19 RX ADMIN — TOPIRAMATE 50 MG: 25 TABLET, FILM COATED ORAL at 20:59

## 2018-10-19 RX ADMIN — INSULIN ASPART 5 UNITS: 100 INJECTION, SOLUTION INTRAVENOUS; SUBCUTANEOUS at 11:01

## 2018-10-19 RX ADMIN — AZITHROMYCIN MONOHYDRATE 500 MG: 500 INJECTION, POWDER, LYOPHILIZED, FOR SOLUTION INTRAVENOUS at 19:09

## 2018-10-19 ASSESSMENT — PAIN DESCRIPTION - DESCRIPTORS
DESCRIPTORS: ACHING
DESCRIPTORS: CRAMPING

## 2018-10-19 ASSESSMENT — ACTIVITIES OF DAILY LIVING (ADL)
ADLS_ACUITY_SCORE: 10
ADLS_ACUITY_SCORE: 9
ADLS_ACUITY_SCORE: 10
ADLS_ACUITY_SCORE: 10

## 2018-10-19 NOTE — PLAN OF CARE
Problem: Patient Care Overview  Goal: Plan of Care/Patient Progress Review  PT: PT screen completed. Pt admitted with pneumonia. Lives in a group home with no stairs to manage. Typically IND with mobility and no assistive device in the home. Uses a 4WW outside of the home. Pt reports she is able to use her walker inside if she needs to on discharge. Typically IND with dressing, has supervision for bathing. Typically pt is a limited community ambulator (limited by LBP)    Discharge Planner PT   Patient plan for discharge: Return to group home  Current status: Supine to sit with IND. Sit to/from stand with mod I from bed and toilet. Ambulates 100' with FWW and SBA. Overall steady. Decreased gait speed. IND with brief management and pericares.   Barriers to return to prior living situation: None identified with 4WW use.  Recommendations for discharge: Return to group home with 4WW use  Rationale for recommendations: Pt agreeable to discharge recommendations as above. Pt agreeable to ambulate with RN staff in [place] 3x/day for duration of stay. Pt appears to be near her mobility baseline. Will complete IP PT orders.       Entered by: Prince Brown 10/19/2018 2:46 PM        Recommend pt to ambulate 3x/day in [place] with RN staff for duration of stay.

## 2018-10-19 NOTE — PROGRESS NOTES
[facility]  Hospitalist Progress Note  Manuelito Smith MD, MD 10/19/2018  (Text Page)  Reason for Stay (Diagnosis): Fever, cough, nasal congestion, shortness of breath         Assessment and Plan:      Nena Tang is a 58 year old female complex medical history currently living at a group home setting with known hypertension, CINDY noncompliant with appliance, CAD, dyslipidemia, prior polysubstance abuse but noted to be in remission, insulin requiring diabetes mellitus who presented earlier in the emergency room due to numerous complaints of worsening coughing spells, decrease in appetite, fever and chills over the past 2 days duration     1.  Fever, chills, respiratory symptoms with cough, shortness of breath, leukocytosis, tachycardia, lactic acidosis with suspected early sepsis likely from possible underlying pneumonia.  -Chest x-ray showing no definitive infiltrates  -Current symptomatology and above findings highly suggestive of infectious process most likely respiratory tract in etiology.  -We will continue with IV antibiotics as started in the emergency room with Rocephin and Zithromax.  Pro-calcitonin though is low.  But had significant leukocytosis earlier and now has been decreasing and back to normal levels.  -Infectious workup and cultures will be closely monitored and followed.  Blood cultures remain no growth up-to-date.  -As needed bronchodilators.  As needed antitussive.  -As needed APAP for fever spikes.     2.  Insulin requiring diabetes mellitus-we will resume her home regimen if she continues to demonstrate tolerance of diabetic diet.  -Decrease her basal insulin and prandial short acting insulin due to decreased oral intake.  -Continue current regimen as glucose levels has been stable.  Will increase back to her home regimen dosing once with better oral intake and serum glucose levels are increasing.     3.  CAD-resume home regimen of aspirin and statins, metoprolol  continued.     4.  Hypertension-on ARB.     5.  History of migraine headaches, will resume Imitrex and Topamax     6.  History of schizoaffective disorder, depressive type and PTSD-home regimen for this has been resumed.     Code status: Full as per patient's instructions  Admit to inpatient  Prophylaxis: Mechanical  Disposition: Anticipating back to her group home in the next 1-2 days.            Interval History (Subjective):      Continuing care today.  Seen and examined.  Chart reviewed.  Case discussed with nursing care.  She is feeling better, more conversant and cooperative.  She appears to be more interactive and in better spirits this morning.  Reportedly tolerating a little better oral intake.  No reported nausea or vomiting.  No diarrhea today.  Remain afebrile.  Coughing spells has been decreasing in frequency and intensity.  Remain afebrile this morning.  Hemodynamics are stable.  Not requiring any oxygen supplementation.           Physical Exam:      Last Vital Signs:  /52 (BP Location: Right arm)  Pulse 74  Temp 98  F (36.7  C) (Oral)  Resp 16  Ht 1.524 m (5')  Wt 108.9 kg (240 lb)  LMP 01/06/2015  SpO2 91%  BMI 46.87 kg/m2    I/O last 3 completed shifts:  In: 590 [P.O.:590]  Out: 1475 [Urine:1475]  Wt Readings from Last 1 Encounters:   10/17/18 108.9 kg (240 lb)     Vitals:    10/17/18 1513 10/17/18 2136   Weight: 99.8 kg (220 lb) 108.9 kg (240 lb)       Constitutional: Awake, cooperative, no apparent distress   Respiratory:  Fair air entry, bilateral mild rhonchi, no wheezes   Cardiovascular: Regular rate and rhythm, normal S1 and S2, and no murmur noted   Abdomen: Normal bowel sounds, soft, non-distended, non-tender   Skin: No rashes, no cyanosis, dry to touch   Neuro: Alert and oriented x3, no weakness, slow and soft, spontaneous and coherent speech   Extremities: No edema, normal range of motion   Other(s):  Depressed mood, not agitated       All other systems: Negative           Medications:      All current medications were reviewed with changes reflected in problem list.         Data:      All new lab and imaging data was reviewed.   Labs:    Recent Labs  Lab 10/17/18  1805 10/17/18  1619 10/17/18  1530   CULT No growth No growth after 2 days No growth after 2 days       Recent Labs  Lab 10/19/18  0743 10/18/18  0659 10/17/18  1531   WBC 10.7 17.6* 12.1*   HGB 8.3* 9.0* 10.5*   HCT 26.4* 28.1* 33.2*   MCV 99 97 98    180 212       Recent Labs  Lab 10/19/18  0743 10/18/18  0659 10/17/18  1531    137 137   POTASSIUM 3.9 3.6 4.3   CHLORIDE 113* 108 106   CO2 20 21 26   ANIONGAP 6 8 5   GLC 84 99 173*   BUN 12 15 15   CR 0.89 1.09* 1.04   GFRESTIMATED 66 52* 54*   GFRESTBLACK 79 62 66   ALLEN 8.0* 8.3* 8.9   MAG  --  1.7  --    PROTTOTAL  --   --  8.0   ALBUMIN  --   --  3.7   BILITOTAL  --   --  0.2   ALKPHOS  --   --  95   AST  --   --  17   ALT  --   --  23     No results for input(s): SED, CRP in the last 168 hours.    Recent Labs  Lab 10/19/18  0803 10/19/18  0743 10/19/18  0223 10/18/18  2108 10/18/18  1722 10/18/18  0902 10/18/18  0659  10/17/18  1531   GLC  --  84  --   --   --   --  99  --  173*   BGM 84  --  107* 229* 103* 92  --   < >  --    < > = values in this interval not displayed.  No results for input(s): INR in the last 168 hours.    Recent Labs  Lab 10/17/18  1805   COLOR Yellow   APPEARANCE Clear   URINEGLC Negative   URINEBILI Negative   URINEKETONE Negative   SG 1.016   UBLD Negative   URINEPH 6.0   PROTEIN Negative   NITRITE Negative   LEUKEST Negative   RBCU 0   WBCU <1      Imaging:   Results for orders placed or performed during the hospital encounter of 10/17/18   XR Chest 2 Views    Narrative    CHEST TWO VIEWS 10/17/2018 4:30 PM     HISTORY: Fever.       Impression    IMPRESSION: Allowing for suboptimal inspiration, the lungs appear  grossly clear. There may be mild vascular congestion in the upper  lobes. However, no pleural effusions are  demonstrated.    ALONSO MONTEIRO MD     *Note: Due to a large number of results and/or encounters for the requested time period, some results have not been displayed. A complete set of results can be found in Results Review.

## 2018-10-19 NOTE — PLAN OF CARE
Problem: Patient Care Overview  Goal: Plan of Care/Patient Progress Review  PT: Evaluation attempted. Pt repeatedly falling asleep despite verbal and tactile cues. Will attempt back as schedule allows.

## 2018-10-19 NOTE — PLAN OF CARE
Problem: Patient Care Overview  Goal: Plan of Care/Patient Progress Review  Outcome: No Change  Alert and oriented.  Ax1 with gait belt.  On droplet for possible pna.   VSS.  95% on RA.  Afebrile.   LS diminished.  Denies SOB but frequent coughing.  PRN tessalon x1 with no relief.  PRN neb given by RT per pt request.   BS hypoactive, passing flatus.  1 formed and 2 loose stools.   Voided x4.   NS at 100 ml/hr.  IV Zithromax and rocephin.   WBC 17.6, Hgb 9.0, Cr 1.09.    and 229.  Novolog and Lantus scheduled.   Will continue to monitor.

## 2018-10-19 NOTE — PROGRESS NOTES
Discharge Planner   Discharge Plans in progress: Spoke with Zheng from  setting. They would be able to take pt back on Sunday [phone]  Barriers to discharge plan: one. IF new meds please fill med here if weekend discharge.   Follow up plan: Staff can  discharge packet when they pick pt up.        Entered by: Corinne C. White 10/19/2018 1:12 PM

## 2018-10-19 NOTE — PLAN OF CARE
Problem: Patient Care Overview  Goal: Plan of Care/Patient Progress Review  Outcome: No Change  Lungs CTA  Productive cough, tessalon and robitussin with mild relief   ml/hr - Zithromax, Rocephin IV  AAOx4, pleasant and cooperative, SBA with GB for weakness  Voiding without difficulty  C/o abd pain, reported BMs on evening shift, continue to monitor  Afebrile, VSS  /72 (BP Location: Right arm)  Pulse 74  Temp 97.2  F (36.2  C)  Resp 16  Ht 1.524 m (5')  Wt 108.9 kg (240 lb)  LMP 01/06/2015  SpO2 96%  BMI 46.87 kg/m2

## 2018-10-20 LAB
GLUCOSE BLDC GLUCOMTR-MCNC: 101 MG/DL (ref 70–99)
GLUCOSE BLDC GLUCOMTR-MCNC: 104 MG/DL (ref 70–99)
GLUCOSE BLDC GLUCOMTR-MCNC: 173 MG/DL (ref 70–99)
GLUCOSE BLDC GLUCOMTR-MCNC: 89 MG/DL (ref 70–99)
GLUCOSE BLDC GLUCOMTR-MCNC: 90 MG/DL (ref 70–99)

## 2018-10-20 PROCEDURE — 25000132 ZZH RX MED GY IP 250 OP 250 PS 637: Mod: GY | Performed by: INTERNAL MEDICINE

## 2018-10-20 PROCEDURE — 25000125 ZZHC RX 250: Performed by: INTERNAL MEDICINE

## 2018-10-20 PROCEDURE — 25000131 ZZH RX MED GY IP 250 OP 636 PS 637: Mod: GY | Performed by: INTERNAL MEDICINE

## 2018-10-20 PROCEDURE — A9270 NON-COVERED ITEM OR SERVICE: HCPCS | Mod: GY | Performed by: INTERNAL MEDICINE

## 2018-10-20 PROCEDURE — 00000146 ZZHCL STATISTIC GLUCOSE BY METER IP

## 2018-10-20 PROCEDURE — 40000275 ZZH STATISTIC RCP TIME EA 10 MIN

## 2018-10-20 PROCEDURE — 99232 SBSQ HOSP IP/OBS MODERATE 35: CPT | Performed by: INTERNAL MEDICINE

## 2018-10-20 PROCEDURE — 25000128 H RX IP 250 OP 636: Performed by: INTERNAL MEDICINE

## 2018-10-20 PROCEDURE — 94640 AIRWAY INHALATION TREATMENT: CPT

## 2018-10-20 PROCEDURE — 12000000 ZZH R&B MED SURG/OB

## 2018-10-20 RX ADMIN — BENZTROPINE MESYLATE 0.5 MG: 0.5 TABLET ORAL at 21:13

## 2018-10-20 RX ADMIN — BENZONATATE 100 MG: 100 CAPSULE ORAL at 08:10

## 2018-10-20 RX ADMIN — PALIPERIDONE 9 MG: 3 TABLET, EXTENDED RELEASE ORAL at 08:09

## 2018-10-20 RX ADMIN — INSULIN GLARGINE 30 UNITS: 100 INJECTION, SOLUTION SUBCUTANEOUS at 21:18

## 2018-10-20 RX ADMIN — GABAPENTIN 600 MG: 300 CAPSULE ORAL at 08:09

## 2018-10-20 RX ADMIN — METOPROLOL SUCCINATE 25 MG: 25 TABLET, EXTENDED RELEASE ORAL at 08:10

## 2018-10-20 RX ADMIN — ALBUTEROL SULFATE 2.5 MG: 2.5 SOLUTION RESPIRATORY (INHALATION) at 19:20

## 2018-10-20 RX ADMIN — TOPIRAMATE 50 MG: 25 TABLET, FILM COATED ORAL at 21:13

## 2018-10-20 RX ADMIN — BENZTROPINE MESYLATE 0.5 MG: 0.5 TABLET ORAL at 08:10

## 2018-10-20 RX ADMIN — FLUTICASONE PROPIONATE 1 SPRAY: 50 SPRAY, METERED NASAL at 08:12

## 2018-10-20 RX ADMIN — ATORVASTATIN CALCIUM 80 MG: 40 TABLET, FILM COATED ORAL at 21:13

## 2018-10-20 RX ADMIN — Medication 10 MG: at 17:38

## 2018-10-20 RX ADMIN — LOSARTAN POTASSIUM 100 MG: 100 TABLET ORAL at 08:09

## 2018-10-20 RX ADMIN — GUAIFENESIN 10 ML: 100 SOLUTION ORAL at 01:09

## 2018-10-20 RX ADMIN — TOPIRAMATE 50 MG: 25 TABLET, FILM COATED ORAL at 08:10

## 2018-10-20 RX ADMIN — CEFTRIAXONE SODIUM 2 G: 2 INJECTION, POWDER, FOR SOLUTION INTRAMUSCULAR; INTRAVENOUS at 17:38

## 2018-10-20 RX ADMIN — SERTRALINE HYDROCHLORIDE 200 MG: 100 TABLET ORAL at 08:10

## 2018-10-20 RX ADMIN — GABAPENTIN 600 MG: 300 CAPSULE ORAL at 21:13

## 2018-10-20 RX ADMIN — GUAIFENESIN 10 ML: 100 SOLUTION ORAL at 14:15

## 2018-10-20 RX ADMIN — GABAPENTIN 600 MG: 300 CAPSULE ORAL at 15:31

## 2018-10-20 RX ADMIN — ASPIRIN 81 MG: 81 TABLET, COATED ORAL at 08:09

## 2018-10-20 ASSESSMENT — ACTIVITIES OF DAILY LIVING (ADL)
ADLS_ACUITY_SCORE: 9

## 2018-10-20 NOTE — PLAN OF CARE
Problem: Patient Care Overview  Goal: Plan of Care/Patient Progress Review  Pt slept through shift waking only for assessments and toileting, received PRN robitussin x1 for cough = effective, SBA for mobility with walker, very sleepy\flat affect. Reports some abdominal cramping start of shift with PRN tylenol = effective. PIV saline locked, continues IV rocephin and zithromax daily. BGs 192 / 101 without need for sliding scale. Expected discharge 1-2 days back to group home per MD note.

## 2018-10-20 NOTE — PLAN OF CARE
Problem: Patient Care Overview  Goal: Plan of Care/Patient Progress Review  Outcome: Improving  [id number]:  VS: Stable. Afebrile. Denies pain.   GI: BS active, passing flatus. Denies nausea. Tolerating regular, diabetic diet. Good appetite for lunch, refused breakfast.   LS: Clear, denies shortness of breath. Minimal cough today, worsens in the evening per patient.   : Voiding without issue.   Neuro: Alert and oriented x4. CMS intact.   Mobility: Up SBA with walker to and from bathroom. Patient declined multiple attempts from writer and other nursing staff to go for a walk in the halls. PT saw patient, signed off as patient at baseline with mobility.   Disposition: TBD.

## 2018-10-20 NOTE — PROGRESS NOTES
[facility]  Hospitalist Progress Note  Manuelito Smith MD, MD 10/20/2018  (Text Page)  Reason for Stay (Diagnosis): Fever, cough, nasal congestion, shortness of breath         Assessment and Plan:      Nena Tang is a 58 year old female complex medical history currently living at a group home setting with known hypertension, CINDY noncompliant with appliance, CAD, dyslipidemia, prior polysubstance abuse but noted to be in remission, insulin requiring diabetes mellitus who presented earlier in the emergency room due to numerous complaints of worsening coughing spells, decrease in appetite, fever and chills over the past 2 days duration     1.  Fever, chills, respiratory symptoms with cough, shortness of breath, leukocytosis, tachycardia, lactic acidosis with suspected early sepsis likely from possible underlying pneumonia.  -Chest x-ray showing no definitive infiltrates  -Current symptomatology and above findings highly suggestive of infectious process most likely respiratory tract in etiology.  -We will continue with IV antibiotics as started in the emergency room with Rocephin and Zithromax.  Pro-calcitonin though is low.  But had significant leukocytosis earlier and now has been decreasing and back to normal levels.  -Infectious workup and cultures will be closely monitored and followed.  Blood cultures remain no growth up-to-date.  -As needed bronchodilators.  As needed antitussive.  -As needed APAP for fever spikes.     2.  Insulin requiring diabetes mellitus-we will resume her home regimen if she continues to demonstrate tolerance of diabetic diet.  -Decrease her basal insulin and prandial short acting insulin due to decreased oral intake.  -Continue current regimen as glucose levels has been stable.  Will increase back to her home regimen dosing once with better oral intake and serum glucose levels are increasing.  -She still has decrease in appetite but no episodes of hypoglycemia.   Remain on her insulin regimen.  We will just change her diet to regular diet to see if this will spike her interest with her oral intake.     3.  CAD-resume home regimen of aspirin and statins, metoprolol continued.     4.  Hypertension-on ARB.     5.  History of migraine headaches, will resume Imitrex and Topamax     6.  History of schizoaffective disorder, depressive type and PTSD-home regimen for this has been resumed.     Code status: Full as per patient's instructions  Admit to inpatient  Prophylaxis: Mechanical  Disposition: Anticipating back to her group home in the next 24 hours.            Interval History (Subjective):      Continuing care today.  Seen and examined.  Chart reviewed.  Case discussed with nursing care.  Nena is found sleeping, comfortably, but easily aroused with verbal stimuli.  She is awake, conversant during exam.  She stated that her coughing spells has been improving in terms of intensity and frequency.  She feels a whole lot better but still has lack of appetite but denies any abdominal symptoms of nausea, vomiting or abdominal pain.  No reported diarrhea had a bowel movement last night with normal-appearing stools.    No reported mental status changes.  Currently afebrile and not requiring any oxygen supplementation.  .           Physical Exam:      Last Vital Signs:  /62 (BP Location: Right arm)  Pulse 74  Temp 97.4  F (36.3  C) (Oral)  Resp 16  Ht 1.524 m (5')  Wt 108.9 kg (240 lb)  LMP 01/06/2015  SpO2 93%  BMI 46.87 kg/m2    I/O last 3 completed shifts:  In: 1906 [P.O.:1030; I.V.:876]  Out: -   Wt Readings from Last 1 Encounters:   10/17/18 108.9 kg (240 lb)     Vitals:    10/17/18 1513 10/17/18 2136   Weight: 99.8 kg (220 lb) 108.9 kg (240 lb)       Constitutional: Awake, cooperative, no apparent distress   Respiratory:  Fair air entry, earlier rhonchi has resolved, no wheezes no crackles   Cardiovascular: Regular rate and rhythm, normal S1 and S2, and no murmur  noted   Abdomen: Normal bowel sounds, soft, non-distended, non-tender   Skin: No rashes, no cyanosis, dry to touch   Neuro: Alert and oriented x3, no weakness, slow and soft, spontaneous and coherent speech   Extremities: No edema, normal range of motion   Other(s):  Depressed mood, not agitated       All other systems: Negative          Medications:      All current medications were reviewed with changes reflected in problem list.         Data:      All new lab and imaging data was reviewed.   Labs:    Recent Labs  Lab 10/17/18  1805 10/17/18  1619 10/17/18  1530   CULT No growth No growth after 3 days No growth after 3 days       Recent Labs  Lab 10/19/18  0743 10/18/18  0659 10/17/18  1531   WBC 10.7 17.6* 12.1*   HGB 8.3* 9.0* 10.5*   HCT 26.4* 28.1* 33.2*   MCV 99 97 98    180 212       Recent Labs  Lab 10/19/18  0743 10/18/18  0659 10/17/18  1531    137 137   POTASSIUM 3.9 3.6 4.3   CHLORIDE 113* 108 106   CO2 20 21 26   ANIONGAP 6 8 5   GLC 84 99 173*   BUN 12 15 15   CR 0.89 1.09* 1.04   GFRESTIMATED 66 52* 54*   GFRESTBLACK 79 62 66   ALLEN 8.0* 8.3* 8.9   MAG  --  1.7  --    PROTTOTAL  --   --  8.0   ALBUMIN  --   --  3.7   BILITOTAL  --   --  0.2   ALKPHOS  --   --  95   AST  --   --  17   ALT  --   --  23     No results for input(s): SED, CRP in the last 168 hours.    Recent Labs  Lab 10/20/18  0901 10/20/18  0222 10/19/18  2058 10/19/18  1722 10/19/18  1055  10/19/18  0743  10/18/18  0659  10/17/18  1531   GLC  --   --   --   --   --   --  84  --  99  --  173*   BGM 90 101* 192* 92 109*  < >  --   < >  --   < >  --    < > = values in this interval not displayed.  No results for input(s): INR in the last 168 hours.    Recent Labs  Lab 10/17/18  1805   COLOR Yellow   APPEARANCE Clear   URINEGLC Negative   URINEBILI Negative   URINEKETONE Negative   SG 1.016   UBLD Negative   URINEPH 6.0   PROTEIN Negative   NITRITE Negative   LEUKEST Negative   RBCU 0   WBCU <1      Imaging:   Results for  orders placed or performed during the hospital encounter of 10/17/18   XR Chest 2 Views    Narrative    CHEST TWO VIEWS 10/17/2018 4:30 PM     HISTORY: Fever.       Impression    IMPRESSION: Allowing for suboptimal inspiration, the lungs appear  grossly clear. There may be mild vascular congestion in the upper  lobes. However, no pleural effusions are demonstrated.    ALONSO MONTEIRO MD     *Note: Due to a large number of results and/or encounters for the requested time period, some results have not been displayed. A complete set of results can be found in Results Review.

## 2018-10-20 NOTE — PLAN OF CARE
"Problem: Patient Care Overview  Goal: Plan of Care/Patient Progress Review  Outcome: Improving  [id number]:  VS: BP slightly elevated, afebrile. Denies pain.   GI: BS active, passing flatus. Denies nausea. Reports decreased appetite. Patient refused breakfast stating \"I don't ever eat breakfast.\" At lunch, patient stated she was hungry but when food came, she at very little and practically no carbohydrates.   LS: Clear. Denies shortness of breath. Harsh, nonproductive cough noted. Robitussin PRN. Remains on IV antibiotics.   : Voiding without issue.   Neuro:  Alert and oriented x4, CMS intact.   Mobility: Up with SBA and walker. Ambulated in halls x3 today.   Disposition: TBD.         "

## 2018-10-21 ENCOUNTER — NURSE TRIAGE (OUTPATIENT)
Dept: NURSING | Facility: CLINIC | Age: 58
End: 2018-10-21

## 2018-10-21 ENCOUNTER — TELEPHONE (OUTPATIENT)
Dept: FAMILY MEDICINE | Facility: CLINIC | Age: 58
End: 2018-10-21

## 2018-10-21 VITALS
WEIGHT: 240 LBS | HEIGHT: 60 IN | TEMPERATURE: 96.2 F | SYSTOLIC BLOOD PRESSURE: 172 MMHG | OXYGEN SATURATION: 95 % | RESPIRATION RATE: 16 BRPM | BODY MASS INDEX: 47.12 KG/M2 | DIASTOLIC BLOOD PRESSURE: 72 MMHG | HEART RATE: 74 BPM

## 2018-10-21 LAB
GLUCOSE BLDC GLUCOMTR-MCNC: 118 MG/DL (ref 70–99)
GLUCOSE BLDC GLUCOMTR-MCNC: 144 MG/DL (ref 70–99)
GLUCOSE BLDC GLUCOMTR-MCNC: 151 MG/DL (ref 70–99)

## 2018-10-21 PROCEDURE — A9270 NON-COVERED ITEM OR SERVICE: HCPCS | Mod: GY | Performed by: INTERNAL MEDICINE

## 2018-10-21 PROCEDURE — 25000132 ZZH RX MED GY IP 250 OP 250 PS 637: Mod: GY | Performed by: INTERNAL MEDICINE

## 2018-10-21 PROCEDURE — 94640 AIRWAY INHALATION TREATMENT: CPT

## 2018-10-21 PROCEDURE — 25000131 ZZH RX MED GY IP 250 OP 636 PS 637: Mod: GY | Performed by: INTERNAL MEDICINE

## 2018-10-21 PROCEDURE — 40000275 ZZH STATISTIC RCP TIME EA 10 MIN

## 2018-10-21 PROCEDURE — 99239 HOSP IP/OBS DSCHRG MGMT >30: CPT | Performed by: INTERNAL MEDICINE

## 2018-10-21 PROCEDURE — 25000125 ZZHC RX 250: Performed by: INTERNAL MEDICINE

## 2018-10-21 PROCEDURE — 00000146 ZZHCL STATISTIC GLUCOSE BY METER IP

## 2018-10-21 RX ORDER — METOPROLOL TARTRATE 25 MG/1
25 TABLET, FILM COATED ORAL ONCE
Status: COMPLETED | OUTPATIENT
Start: 2018-10-21 | End: 2018-10-21

## 2018-10-21 RX ADMIN — GABAPENTIN 600 MG: 300 CAPSULE ORAL at 16:04

## 2018-10-21 RX ADMIN — PALIPERIDONE 9 MG: 3 TABLET, EXTENDED RELEASE ORAL at 08:40

## 2018-10-21 RX ADMIN — ASPIRIN 81 MG: 81 TABLET, COATED ORAL at 08:40

## 2018-10-21 RX ADMIN — INSULIN ASPART 1 UNITS: 100 INJECTION, SOLUTION INTRAVENOUS; SUBCUTANEOUS at 14:12

## 2018-10-21 RX ADMIN — SENNOSIDES AND DOCUSATE SODIUM 1 TABLET: 8.6; 5 TABLET ORAL at 08:40

## 2018-10-21 RX ADMIN — GUAIFENESIN 10 ML: 100 SOLUTION ORAL at 14:18

## 2018-10-21 RX ADMIN — METOPROLOL SUCCINATE 25 MG: 25 TABLET, EXTENDED RELEASE ORAL at 08:40

## 2018-10-21 RX ADMIN — GUAIFENESIN 10 ML: 100 SOLUTION ORAL at 08:39

## 2018-10-21 RX ADMIN — FLUTICASONE PROPIONATE 1 SPRAY: 50 SPRAY, METERED NASAL at 08:39

## 2018-10-21 RX ADMIN — LOSARTAN POTASSIUM 100 MG: 100 TABLET ORAL at 08:40

## 2018-10-21 RX ADMIN — INSULIN ASPART 5 UNITS: 100 INJECTION, SOLUTION INTRAVENOUS; SUBCUTANEOUS at 09:46

## 2018-10-21 RX ADMIN — BENZTROPINE MESYLATE 0.5 MG: 0.5 TABLET ORAL at 08:40

## 2018-10-21 RX ADMIN — METOPROLOL TARTRATE 25 MG: 25 TABLET ORAL at 16:23

## 2018-10-21 RX ADMIN — SERTRALINE HYDROCHLORIDE 200 MG: 100 TABLET ORAL at 08:39

## 2018-10-21 RX ADMIN — TOPIRAMATE 50 MG: 25 TABLET, FILM COATED ORAL at 08:39

## 2018-10-21 RX ADMIN — INSULIN ASPART 5 UNITS: 100 INJECTION, SOLUTION INTRAVENOUS; SUBCUTANEOUS at 14:13

## 2018-10-21 RX ADMIN — ALBUTEROL SULFATE 2.5 MG: 2.5 SOLUTION RESPIRATORY (INHALATION) at 09:04

## 2018-10-21 RX ADMIN — GABAPENTIN 600 MG: 300 CAPSULE ORAL at 08:39

## 2018-10-21 ASSESSMENT — ACTIVITIES OF DAILY LIVING (ADL)
ADLS_ACUITY_SCORE: 9

## 2018-10-21 NOTE — PLAN OF CARE
Problem: Patient Care Overview  Goal: Plan of Care/Patient Progress Review  Pt slept majority of shift, VSS, denies pain, minimal cough noted with need and denied PRN interventions, without PIV access and unable to obtain for IV abx - see MD notification, still without PIV access, expected to discharge today per MD note. Good fluid intake but denied hunger or snacks this shift. BGs 173 / 151 without sliding scale given. SBA with walker for mobility, expected to return to group home on discharge.

## 2018-10-21 NOTE — DISCHARGE SUMMARY
[facility]  Discharge Summary  Name: Nena Tang    MRN: [id number]  YOB: 1960    Age: 58 year old  Date of Discharge:  10/21/2018  Date of Admission: 10/17/2018  Primary Care Provider: Rowena Haas  Discharge Physician:  Manuelito Smith MD  Discharging Service:  Hospitalist      Discharge Diagnosis:  Fever, chills, respiratory symptoms with cough, shortness of breath, leukocytosis, tachycardia, lactic acidosis with suspected early sepsis likely from possible underlying pneumonia  Insulin requiring diabetes mellitus  History of CAD  History of schizoaffective disorder  Hypertension       Other Diagnosis:  Past Medical History:   Diagnosis Date     CAD (coronary artery disease)     5/2014 cath, nonbostructive stenosis to LAD, RCA.     Chronic low back pain 1/22/2013     Cocaine abuse, in remission (H)      Fecal urgency 3/8/2012     History of heroin abuse      Hyperlipidemia LDL goal <100 10/31/2010     Hypertension 7/29/2013     Illiterate 8/30/2011     Irritable bowel syndrome      Left cataract      Migraine 4/19/2012     Migraine headache 4/22/2013     Moderate major depression (H) 6/8/2011     Noncompliance with medication regimen 6/8/2011     Obesity      CINDY (obstructive sleep apnea) 3/8/2012    uses CPAP     Osteopenia 10/7/2009     Schizoaffective disorder, depressive type (H) 2/25/2013     Suicidal intent 10/2/2013     Takotsubo cardiomyopathy      Type 2 diabetes mellitus (H) 8/30/2011     Uterine cancer (H) 1983     Verbal auditory hallucination 10/4/2012          Discharge Disposition:  Discharged to group home     Allergies:  Allergies   Allergen Reactions     Imidazole Antifungals Hives     Tolerates diflucan     Ketoprofen Itching     Pruritis to topical     Lisinopril Hives     Metformin Other (See Comments)     Patient hospitalized for lactic acidosis - admitting provider suspectd caused by metformin     Metronidazole Hives     Posaconazole Hives     Tolerates  diflucan        Discharge Medications:   Current Discharge Medication List      START taking these medications    Details   amoxicillin-clavulanate (AUGMENTIN) 875-125 MG per tablet Take 1 tablet by mouth 2 times daily for 2 days  Qty: 4 tablet, Refills: 0    Associated Diagnoses: Sepsis, due to unspecified organism (H)      guaiFENesin (ROBITUSSIN) 20 mg/mL SOLN solution Take 10 mLs by mouth every 6 hours as needed for cough  Qty: 236 mL, Refills: 0    Associated Diagnoses: Productive cough         CONTINUE these medications which have NOT CHANGED    Details   acetaminophen (TYLENOL) 500 MG tablet Take 2 tablets (1,000 mg) by mouth 3 times daily as needed for mild pain  Qty: 100 tablet, Refills: 3    Associated Diagnoses: Chronic low back pain, unspecified back pain laterality, with sciatica presence unspecified      albuterol (PROAIR HFA/PROVENTIL HFA/VENTOLIN HFA) 108 (90 Base) MCG/ACT Inhaler Inhale 2 puffs into the lungs every 6 hours as needed for shortness of breath / dyspnea or wheezing  Qty: 1 Inhaler, Refills: 0    Comments: Patient has no nebulizer machine until Monday 6-25-18  Associated Diagnoses: Pneumonia, organism unspecified(486)      aspirin 81 MG EC tablet TAKE 1 TABLET (81MG) BY MOUTH DAILY  Qty: 28 tablet, Refills: 3    Associated Diagnoses: Coronary artery disease involving native heart with angina pectoris, unspecified vessel or lesion type (H)      atorvastatin (LIPITOR) 80 MG tablet TAKE 1 TABLET (80MG) BY MOUTH DAILY  Qty: 30 tablet, Refills: 11    Associated Diagnoses: Hyperlipidemia LDL goal <100      benztropine (COGENTIN) 0.5 MG tablet Take 1 tablet (0.5 mg) by mouth 2 times daily  Qty: 60 tablet, Refills: 2    Associated Diagnoses: Extrapyramidal and movement disorder      calcium carbonate (OS-ALLEN 600 MG [place]. [place]) 1500 (600 CA) MG tablet Take 1 tablet (1,500 mg) by mouth daily  Qty: 180 tablet, Refills: 3    Associated Diagnoses: Other osteoporosis without current pathological  fracture      dulaglutide (TRULICITY) 1.5 MG/0.5ML pen Inject 1.5 mg Subcutaneous every 7 days  Qty: 2 mL, Refills: 3    Associated Diagnoses: Type 2 diabetes mellitus with mild nonproliferative retinopathy without macular edema, with long-term current use of insulin, unspecified laterality (H)      fluticasone (FLONASE) 50 MCG/ACT spray Spray 1-2 sprays into both nostrils daily  Qty: 1 Bottle, Refills: 11    Associated Diagnoses: Seasonal allergic rhinitis, unspecified chronicity, unspecified trigger      gabapentin (NEURONTIN) 300 MG capsule TAKE 2 CAPSULES (600MG) BY MOUTH THREE TIMES A DAY  Qty: 168 capsule, Refills: 1    Associated Diagnoses: Type 2 diabetes mellitus with diabetic neuropathy, with long-term current use of insulin (H)      glucose 4 G CHEW chewable tablet Take 2 every 15 minutes for blood sugar <70mg/dL. Recheck blood sugar every 15 minutes until above 70mg/dL, then eat a substantial meal.  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, with long-term current use of insulin, unspecified laterality (H)      insulin glargine (LANTUS SOLOSTAR) 100 UNIT/ML pen Inject 48 Units Subcutaneous At Bedtime  Qty: 3 mL, Refills: 11    Associated Diagnoses: Type 2 diabetes mellitus with mild nonproliferative retinopathy  without macular edema, with long-term current use of insulin, unspecified laterality (H)      ipratropium - albuterol 0.5 mg/2.5 mg/3 mL (DUONEB) 0.5-2.5 (3) MG/3ML neb solution Take 1 vial (3 mLs) by nebulization every 6 hours as needed for shortness of breath / dyspnea or wheezing  Qty: 30 vial, Refills: 0    Associated Diagnoses: Wheezing      losartan (COZAAR) 100 MG tablet TAKE 1 TABLET (100MG) BY MOUTH DAILY  Qty: 28 tablet, Refills: 3    Associated Diagnoses: Coronary artery disease involving native heart with angina pectoris, unspecified vessel or lesion type (H)      melatonin 5 MG CAPS Take 2 capsules by mouth daily At dinnertime  Qty: 180 capsule, Refills: 2    Associated Diagnoses: Insomnia, unspecified type      metoprolol succinate (TOPROL-XL) 25 MG 24 hr tablet Take 1 tablet (25 mg) by mouth daily  Qty: 30 tablet, Refills: 1    Associated Diagnoses: Coronary artery disease involving native heart with angina pectoris, unspecified vessel or lesion type (H)      paliperidone (INVEGA) 9 MG 24 hr tablet Take 1 tablet (9 mg) by mouth every morning  Qty: 30 tablet, Refills: 2    Associated Diagnoses: Schizoaffective disorder, bipolar type (H)      polyethylene glycol (MIRALAX/GLYCOLAX) powder TAKE 8.5 GRAMS (1/2 CAPFUL) BY MOUTH DAILY  Qty: 527 g, Refills: 3    Associated Diagnoses: Constipation, unspecified constipation type      ranitidine (ZANTAC) 300 MG tablet TAKE 1 TABLET (300MG) BY MOUTH AT BEDTIME  Qty: 90 tablet, Refills: 1    Associated Diagnoses: Gastroesophageal reflux disease without esophagitis      senna-docusate (SENOKOT-S;PERICOLACE) 8.6-50 MG per tablet Take 1 tablet by mouth 2 times daily as needed for constipation  Qty: 100 tablet, Refills: 1    Associated Diagnoses: Drug-induced constipation      sertraline (ZOLOFT) 100 MG tablet Take 2 tablets (200 mg) by mouth daily  Qty: 60 tablet, Refills: 2    Associated Diagnoses: Schizoaffective disorder, bipolar type (H)      SUMAtriptan  (IMITREX) 25 MG tablet Take 1-2 tablets (25-50 mg) by mouth at onset of headache for migraine May repeat in 2 hours. Max 8 tablets/24 hours.  Qty: 12 tablet, Refills: 1    Associated Diagnoses: Migraine with aura and without status migrainosus, not intractable      topiramate (TOPAMAX) 25 MG tablet Take 2 tablets (50 mg) by mouth 2 times daily  Qty: 120 tablet, Refills: 3    Associated Diagnoses: Morbid obesity (H)      vitamin D3 (CHOLECALCIFEROL) 34862 UNITS capsule TAKE 1 CAPSULE (50,000 UNITS) MONTHLY  Qty: 12 capsule, Refills: 1    Associated Diagnoses: Vitamin D deficiency              Condition on Discharge:  Discharge condition: Stable   Discharge vitals: Blood pressure 159/75, pulse 74, temperature 97.1  F (36.2  C), temperature source Oral, resp. rate 16, height 1.524 m (5'), weight 108.9 kg (240 lb), last menstrual period 01/06/2015, SpO2 96 %.   Code status on discharge: Full Code     History of Present Illness:  See detailed admission note for full details.        Significant Physical Exam Findings Day of Discharge:  HEENT; Atraumatic, normocephalic, pinkish conjuctiva, pupils bilateral reactive   Skin: warm and moist, no rashes  Lungs: equal chest expansion, clear to auscultation, no wheezes, no stridor, no crackles,   Heart: normal rate, normal rhythm, no rubs or gallops.   Abdomen: normal bowel sounds, no tenderness, no peritoneal signs, no guarding  Extremities: no deformities, no edema   Neuro; follow commands, alert and oriented x3, spontaneous speech, coherent, moves all extremities spontaneously  Psych; no hallucination, euthymic mood, not agitated        Procedures other than Imaging:  None     Imaging:  No results found for this or any previous visit (from the past 48 hour(s)).     Consultations:  No consultations were requested during this admission.     Recent Lab Results:    Recent Labs  Lab 10/19/18  0743 10/18/18  0659 10/17/18  1531   WBC 10.7 17.6* 12.1*   HGB 8.3* 9.0* 10.5*   HCT  26.4* 28.1* 33.2*   MCV 99 97 98    180 212       Recent Labs  Lab 10/17/18  1805 10/17/18  1619 10/17/18  1530   CULT No growth No growth after 3 days No growth after 3 days       Recent Labs  Lab 10/19/18  0743 10/18/18  0659 10/17/18  1531    137 137   POTASSIUM 3.9 3.6 4.3   CHLORIDE 113* 108 106   CO2 20 21 26   ANIONGAP 6 8 5   GLC 84 99 173*   BUN 12 15 15   CR 0.89 1.09* 1.04   GFRESTIMATED 66 52* 54*   GFRESTBLACK 79 62 66   ALLEN 8.0* 8.3* 8.9   MAG  --  1.7  --    PROTTOTAL  --   --  8.0   ALBUMIN  --   --  3.7   BILITOTAL  --   --  0.2   ALKPHOS  --   --  95   AST  --   --  17   ALT  --   --  23       Recent Labs  Lab 10/21/18  0837 10/21/18  0200 10/20/18  2118 10/20/18  1736 10/20/18  1252  10/19/18  0743  10/18/18  0659  10/17/18  1531   GLC  --   --   --   --   --   --  84  --  99  --  173*   * 151* 173* 104* 89  < >  --   < >  --   < >  --    < > = values in this interval not displayed.    Recent Labs  Lab 10/17/18  1835 10/17/18  1531 10/17/18  1530   LACT 1.4 2.1* 2.0     No results for input(s): TROPONIN, TROPI, TROPR in the last 168 hours.    Invalid input(s): TROP, TROPONINIES    Recent Labs  Lab 10/17/18  1805   COLOR Yellow   APPEARANCE Clear   URINEGLC Negative   URINEBILI Negative   URINEKETONE Negative   SG 1.016   UBLD Negative   URINEPH 6.0   PROTEIN Negative   NITRITE Negative   LEUKEST Negative   RBCU 0   WBCU <1          Pending Results:    Unresulted Labs Ordered in the Past 30 Days of this Admission     Date and Time Order Name Status Description    10/17/2018 1525 Blood culture Preliminary     10/17/2018 1521 Blood culture Preliminary            Discharge Instructions and Follow-Up:   Discharge diet:   Active Diet Order      Regular Diet Adult      Diet   Discharge activity: Activity as tolerated   Discharge follow-up: 1-2 weeks with PCP   Outpatient therapy: None    Other instructions: None      Hospital Course:  Continuing care for this 58-year-old very pleasant  -American lady was currently residing in a group home setting with a background history significant for diabetes mellitus insulin requiring, schizoaffective disorder, CAD and hypertension who put initially presented with several days symptomatology of intermittent fever, coughing spells, shortness of breath, lack of appetite and worsening generalized weakness.  Upon initial presentation she is found with leukocytosis, tachycardia, coughing spells, fever spikes with T-max of almost 103  F.  Subsequent diagnostics testing were pursued with no obvious infiltrate seen in the chest x-ray, urine analysis were negative, pro calcitonin was also low but has an accompanying lactic acidosis upon presentation.  He was treated as such with early sepsis with suspicion of bacterial in etiology.  Cultures did not show any significant growth.  She tolerated IV Rocephin and Zithromax during her stay and had significant improvement with her overall symptomatology.  Her leukocytosis has resolved.  No further recurrence of fever spikes.  Coughing spells has been resolving and currently tolerating oral diet with much improved oral intake with no complaints of nausea, vomiting or diarrhea.  We will continue with short course of oral antibiotics in the next 2 more days just to finish a 5-day course.  Her diabetes regimen will resume together with her home regimen for hypertension, and prior known CAD.       Total time spent in face to face contact with the patient and coordinating discharge was:  > 30 Minutes.

## 2018-10-21 NOTE — PLAN OF CARE
Problem: Patient Care Overview  Goal: Plan of Care/Patient Progress Review  Outcome: Adequate for Discharge Date Met: 10/21/18  VS: Stable. Denies pain. Afebrile.   GI: BS active, passing flatus. Denies nausea. Tolerating regular diet. Blood sugar monitored, insulin as ordered.   LS: Clear. Denies shortness of breath. Cough present but improved from yesterday.   : Voiding without issue.   Neuro: Alert and oriented x4. CMS intact.   Mobility: Up SBA with walker.     Patient discharged back to group home. AVS reviewed with patient as well as group home staff ([facility]). All questions answered. Filled scripts for robitussin and augmentin given to [facility] at discharge. All belongings packed and sent with patient. W/c to personal vehicle.

## 2018-10-21 NOTE — PROVIDER NOTIFICATION
16:30 - Dr. Smith notified of:  Routine vitals performed prior to discharge. /67, HR 76.   MD ordered 25mg metoprolol x1. If recheck is SBP <160 and DBP <100 at least one hour after med administration, patient may still discharge home. Group home staff updated.     17:45 - Writer spoke with Dr. Smith regarding patient's blood pressure. BP remains elevated at 172/72, HR 72. Patient asymptomatic. MD approves of discharge to home tonight despite BP and previously noted discussion. Writer to discharge patient to group home.

## 2018-10-21 NOTE — PROVIDER NOTIFICATION
On-call MD paged due to inability to obtain PIV access and unable to receive IV antibiotics this evening and requested switch to PO given potential discharge tomorrow, given okay to keep without PIV access.

## 2018-10-21 NOTE — PROGRESS NOTES
Discharge Planner   Discharge Plans in progress: Pt's bedside nurse just informed me that pt is ready to discharge.  I attempted to reach someone at the  [phone] however only could LVM requesting a call back.    Barriers to discharge plan: NA  Follow up plan: waiting to hear back from someone at , per note  can provide transportation        Entered by: Chely Leslie 10/21/2018 10:11 AM         Addendum:    I CONTACTED  AGAIN AND WAS ABLE TO REACH SOMEONE.  THE SOONEST ANYONE CAN GET HERE WOULD BE A COUPLE HRS.  I HAVE UPDATED BEDSIDE RN.  MEDS WERE FILLED HERE AND discharge ORDERS WILL BE SENT WITH STAFF OF

## 2018-10-22 ENCOUNTER — HOSPITAL ENCOUNTER (OUTPATIENT)
Dept: CT IMAGING | Facility: CLINIC | Age: 58
Discharge: HOME | End: 2018-10-22
Attending: NURSE PRACTITIONER | Admitting: NURSE PRACTITIONER
Payer: MEDICARE

## 2018-10-22 DIAGNOSIS — I25.119 CORONARY ARTERY DISEASE INVOLVING NATIVE HEART WITH ANGINA PECTORIS, UNSPECIFIED VESSEL OR LESION TYPE (H): ICD-10-CM

## 2018-10-22 DIAGNOSIS — K21.9 GASTROESOPHAGEAL REFLUX DISEASE WITHOUT ESOPHAGITIS: ICD-10-CM

## 2018-10-22 DIAGNOSIS — M62.81 MUSCLE WEAKNESS (GENERALIZED): ICD-10-CM

## 2018-10-22 PROCEDURE — 72131 CT LUMBAR SPINE W/O DYE: CPT

## 2018-10-22 NOTE — PROGRESS NOTES
Transition Communication Hand-off for Care Transitions to Next Level of Care Provider    Name: Nena Tang  : 1960  MRN #: [id number]  Primary Care Provider: Rowena Haas  Primary Care MD Name: anoop   Primary Clinic: [address] [place], [place] [address]E [facility] 602  [facility] [address]  Primary Care Clinic Name: [facility]   Reason for Hospitalization:  Shortness of breath [R06.02]  Productive cough [R05]  Sepsis, due to unspecified organism (H) [A41.9]  Admit Date/Time: 10/17/2018  3:03 PM  Discharge Date: 10/21/18  Payor Source: Payor: MEDICARE / Plan: MEDICARE / Product Type: Medicare /     Readmission Assessment Measure (JOHANNE) Risk Score/category: AVERAGE         Reason for Communication Hand-off Referral: Admission diagnoses: PN    Discharge Plan:       Concern for non-adherence with plan of care:   NO  Discharge Needs Assessment:  Needs       Most Recent Value    Equipment Currently Used at Home cane, quad    Other Resources [facility] Worker    [facility] Worker Name pt unsure     [facility] Worker Status Active          Already enrolled in Tele-monitoring program and name of program:  na  Follow-up specialty is recommended: No    Follow-up plan:  Future Appointments  Date Time Provider Department Center   10/29/2018 1:00 PM Kiko Gallego, [facility]   2018 9:30 AM Richardson Lopez, LICSW [facility]   2018 9:30 AM Rowena Haas APRN CNP [facility]   2018 9:30 AM Eugenia De Jesus, [facility]   2018 1:30 PM Kraig Pedersen MD [facility]   3/14/2019 3:15 PM [facility]       Any outstanding tests or procedures:             Reason for your hospital stay       Your admitted earlier with increasing fever, generalized weakness, decreased oral intake, coughing spells and found to have high fever spikes.   Consideration for bacterial infection but with no clear-cut evidence of pneumonia or UTI were entertained and he was provided with antibiotics  during her stay.                Key Recommendations:  Pt was admitted with PNA and UTI.  8 visits/admits this year.  Pt was dc'd to .      Pam Flowers    AVS/Discharge Summary is the source of truth; this is a helpful guide for improved communication of patient story

## 2018-10-22 NOTE — TELEPHONE ENCOUNTER
"  Additional Information    Negative: Unconscious or difficult to awaken    Negative: Seizure occurs    Negative: Acting confused (e.g., disoriented, slurred speech)    Negative: Very weak (e.g., can't stand)    Negative: Sounds like a life-threatening emergency to the triager    Negative: [1] Vomiting AND [2] signs of dehydration (e.g., no urine > 12 hours, very dry mouth, dark urine, etc.)    Negative: [1] Low blood sugar symptoms persist > 15 minutes AND [2] using low blood sugar Care Advice    Negative: [1] Low blood glucose (< 70 mg/dl  or 3.9 mmol/l) persists > 15 minutes AND [2] using low blood sugar Care Advice    Negative: Patient sounds very sick or weak to the triager    Negative: [1] Low blood sugar symptoms with no other adult present AND [2] hasn't tried Care Advice    Negative: [1] Low blood glucose (< 70 mg/dl  or 3.9 mmol/l) with no other adult present AND [2] hasn't tried Care Advice    Negative: Diabetes drug error or overdose (e.g., insulin error or extra dose)    Negative: Caller has URGENT medication or insulin pump question and triager unable to answer question    Negative: [1] Blood glucose < 70  mg/dl (3.9 mmol/l) or symptomatic with other adult present AND [2] cause unknown food, strenuous exercise.)    Negative: [1] Morning (before breakfast) blood glucose < 80 mg/dl (4.5 mmol/L) AND [2] more than once in past week    Negative: [1] Evening (after bedtime snack) blood glucose < 100 mg/dl (5.6 mmol/l) AND [2] more than once in past week    Negative: Caller has NON-URGENT medication question about med that PCP prescribed and triager unable to answer question    Negative: Low blood sugar definition and treatment, questions about    [1] Blood glucose < 70 mg/dl (3.9 mmol/l) or symptomatic AND [2] cause known (all triage questions negative)    Answer Assessment - Initial Assessment Questions  1. SYMPTOMS: \"What symptoms are you concerned about?\"      Low blood sugar  2. ONSET:  \"When did the " "symptoms start?\"      This evening  3. BLOOD GLUCOSE: \"What is your blood glucose level?\"       85  4. USUAL RANGE: \"What is your blood glucose level usually?\" (e.g., usual fasting morning value, usual evening value)      3 hrs ago she had 20 units of Novalog normally 200 at HS  5. TYPE 1 or 2:  \"Do you know what type of diabetes you have?\"  (e.g., Type 1, Type 2, Gestational; doesn't know)       Type 2  6. INSULIN: \"Do you take insulin?\"       novalog and lantus victozia  7. DIABETES PILLS: \"Do you take any pills for your diabetes?\"      no  8. OTHER SYMPTOMS: \"Do you have any symptoms?\" (e.g., fever, frequent urination, difficulty breathing, vomiting)      no  9. LOW BLOOD GLUCOSE TREATMENT: \"What have you done so far to treat the low blood glucose level?\"      Gave her toast and jelly and one egg  10. ALONE: \"Are you alone right now or is someone with you?\"         yes  11. PREGNANCY: \"Is there any chance you are pregnant?\" \"When was your last menstrual period?\"       n/a    Protocols used: DIABETES - LOW BLOOD SUGAR-ADULT-AH    "

## 2018-10-22 NOTE — TELEPHONE ENCOUNTER
Called Dinorah and informed of discontinuation.  Rina Norris RN      Eye Protection Verbiage: Before proceeding with the stage, a plastic scleral shield was inserted. The globe was anesthetized with a few drops of 1% lidocaine with 1:100,000 epinephrine. Then, an appropriate sized scleral shield was chosen and coated with lacrilube ointment. The shield was gently inserted and left in place for the duration of each stage. After the stage was completed, the shield was gently removed.

## 2018-10-22 NOTE — TELEPHONE ENCOUNTER
Call from [facility] Nurse advisor requesting that this on-call provider speak with the [facility] staff regarding Ania's insulin dosing.  She has been struggling to eat per staff report.  Her HS blood sugar was 85 units and the staff expressed concern about her evening Lantus dose.  She typically take 48 units at HS.      Instructed staff to giver her a one time Lantus dose of 24 units and to follow up with PCP on Monday.  Concern that she may not be eating because she has been given medication to reduce weight, perhaps a new plan for her long and short acting insulin could be appropriate.     Message sent to ART Lobo, her PCP.     Antionette Campo, CNP, APNP  [facility]\

## 2018-10-22 NOTE — TELEPHONE ENCOUNTER
Spoke with Gisselle from . She feels that part of the decreased appetite is intentional: Nena will go long periods of time declining food that she doesn't like & wont eat until she gets what she wants to eat. She did just get out of hospital yesterday, Discharge DX: Fever, chills, respiratory symptoms with cough, shortness of breath, leukocytosis, tachycardia, lactic acidosis with suspected early sepsis likely from possible underlying pneumonia. Staff is concerned that PCP does not get the whole picture of Nena & her health since Nena has recently denied that they can attend appts. I did suggest that they fax a care report before future appointment.  Routing to provider  Wendy Schwartz RN

## 2018-10-22 NOTE — TELEPHONE ENCOUNTER
We can plan to try and encourage Nena to have the caregiver at the start of the visit for updates and then to step away from the visit for the rest of the time. Patient is working on her independence and we can also try to give this to her while she works towards getting her own place.    ART Oliver CNP

## 2018-10-22 NOTE — TELEPHONE ENCOUNTER
"Requested Prescriptions   Pending Prescriptions Disp Refills     aspirin 81 MG EC tablet Last Written Prescription Date:  7/3/18  Last Fill Quantity: 28,  # refills: 3   Last office visit: 10/5/2018 with prescribing provider:     Future Office Visit:   Next 5 appointments (look out 90 days)     Nov 01, 2018  9:30 AM CDT   Return Visit with ART Arias CNP   [facility]    [address]  [address] [place] [id number]   [phone]            Nov 01, 2018  9:30 AM CDT   Return Visit with Richardson Lopez [facility] ([facility])    [address]  [address] [place] [address]   [phone]            Nov 01, 2018  9:30 AM CDT   Office Visit with Eugenia De Jesus [facility] [place] ([facility])    [address] [place] [address]  [address] [place] [address]   [phone]            Nov 30, 2018  1:30 PM CST   Return Visit with Kraig Pedersen MD   [facility] ([facility])    [address]  [facility] [address]   [phone]                  28 tablet 3     Sig: TAKE 1 TABLET (81MG) BY MOUTH DAILY    Analgesics (Non-Narcotic Tylenol and ASA Only) Passed    10/22/2018  2:54 PM       Passed - Recent (12 mo) or future (30 days) visit within the authorizing provider's specialty    Patient had office visit in the last 12 months or has a visit in the next 30 days with authorizing provider or within the authorizing provider's specialty.  See \"Patient Info\" tab in inbasket, or \"Choose Columns\" in Meds & Orders section of the refill encounter.             Passed - Patient is age 20 years or older    If ASA is flagged for ages under 20 years old. Forward to provider for confirmation Ryes Syndrome is not a concern.                "

## 2018-10-22 NOTE — TELEPHONE ENCOUNTER
"ranitidine (ZANTAC) 300 MG tablet  Requested Prescriptions  Last Written Prescription Date:  5/10/18  Last Fill Quantity: 90,  # refills: 1   Last office visit: 10/5/2018 with prescribing provider:     Future Office Visit:   Next 5 appointments (look out 90 days)     Nov 01, 2018  9:30 AM CDT   Return Visit with ART Arias CNP   [facility] ([facility])    [address]  [address] [place] [address]   [phone]            Nov 01, 2018  9:30 AM CDT   Return Visit with Richardson Lopez [facility] ([facility])    [address]  [address] [place] [address]   [phone]            Nov 01, 2018  9:30 AM CDT   Office Visit with Eugenia De Jesus [facility] [place] ([facility])    [address] [place] [address]  [address] [place] [address]   [phone]            Nov 30, 2018  1:30 PM CST   Return Visit with Kraig Pedersen MD   [facility] ([facility])    [address]  [facility] [address]   [phone]                  Pending Prescriptions Disp Refills     ranitidine (ZANTAC) 300 MG tablet 90 tablet 1     Sig: TAKE 1 TABLET (300MG) BY MOUTH AT BEDTIME    H2 Blockers Protocol Passed    10/22/2018  3:08 PM       Passed - Patient is age 12 or older       Passed - Recent (12 mo) or future (30 days) visit within the authorizing provider's specialty    Patient had office visit in the last 12 months or has a visit in the next 30 days with authorizing provider or within the authorizing provider's specialty.  See \"Patient Info\" tab in inbasket, or \"Choose Columns\" in Meds & Orders section of the refill encounter.                "

## 2018-10-23 LAB
BACTERIA SPEC CULT: NO GROWTH
BACTERIA SPEC CULT: NO GROWTH
Lab: NORMAL
Lab: NORMAL
SPECIMEN SOURCE: NORMAL
SPECIMEN SOURCE: NORMAL

## 2018-10-23 NOTE — TELEPHONE ENCOUNTER
Rx approved and refilled per [facility] refill protocol.     Corine Cunha RN  10/23/18  9:41 AM

## 2018-10-23 NOTE — TELEPHONE ENCOUNTER
Rx approved and refilled per [facility].     Corine Cunha RN  10/23/18  9:44 AM

## 2018-10-26 ENCOUNTER — OFFICE VISIT (OUTPATIENT)
Dept: FAMILY MEDICINE | Facility: CLINIC | Age: 58
End: 2018-10-26
Payer: MEDICARE

## 2018-10-26 ENCOUNTER — OFFICE VISIT (OUTPATIENT)
Dept: BEHAVIORAL HEALTH | Facility: CLINIC | Age: 58
End: 2018-10-26
Payer: MEDICARE

## 2018-10-26 VITALS
DIASTOLIC BLOOD PRESSURE: 78 MMHG | OXYGEN SATURATION: 96 % | SYSTOLIC BLOOD PRESSURE: 130 MMHG | TEMPERATURE: 98.4 F | WEIGHT: 236 LBS | HEIGHT: 60 IN | BODY MASS INDEX: 46.33 KG/M2 | RESPIRATION RATE: 16 BRPM | HEART RATE: 84 BPM

## 2018-10-26 DIAGNOSIS — Z79.4 TYPE 2 DIABETES MELLITUS WITH STAGE 3 CHRONIC KIDNEY DISEASE, WITH LONG-TERM CURRENT USE OF INSULIN (H): ICD-10-CM

## 2018-10-26 DIAGNOSIS — G47.33 OSA (OBSTRUCTIVE SLEEP APNEA): ICD-10-CM

## 2018-10-26 DIAGNOSIS — Z09 HOSPITAL DISCHARGE FOLLOW-UP: Primary | ICD-10-CM

## 2018-10-26 DIAGNOSIS — E11.22 TYPE 2 DIABETES MELLITUS WITH STAGE 3 CHRONIC KIDNEY DISEASE, WITH LONG-TERM CURRENT USE OF INSULIN (H): ICD-10-CM

## 2018-10-26 DIAGNOSIS — N18.30 TYPE 2 DIABETES MELLITUS WITH STAGE 3 CHRONIC KIDNEY DISEASE, WITH LONG-TERM CURRENT USE OF INSULIN (H): ICD-10-CM

## 2018-10-26 DIAGNOSIS — R05.8 PRODUCTIVE COUGH: ICD-10-CM

## 2018-10-26 DIAGNOSIS — F25.1 SCHIZOAFFECTIVE DISORDER, DEPRESSIVE TYPE (H): Primary | ICD-10-CM

## 2018-10-26 DIAGNOSIS — F43.10 POSTTRAUMATIC STRESS DISORDER: ICD-10-CM

## 2018-10-26 DIAGNOSIS — R04.0 EPISTAXIS: ICD-10-CM

## 2018-10-26 PROBLEM — A41.9 SEPSIS (H): Status: RESOLVED | Noted: 2017-08-29 | Resolved: 2018-10-26

## 2018-10-26 PROBLEM — J18.9 PNEUMONIA OF RIGHT LOWER LOBE DUE TO INFECTIOUS ORGANISM: Status: RESOLVED | Noted: 2017-09-04 | Resolved: 2018-10-26

## 2018-10-26 PROBLEM — J96.01 ACUTE RESPIRATORY FAILURE WITH HYPOXIA (H): Status: RESOLVED | Noted: 2017-09-04 | Resolved: 2018-10-26

## 2018-10-26 PROCEDURE — 99495 TRANSJ CARE MGMT MOD F2F 14D: CPT | Performed by: FAMILY MEDICINE

## 2018-10-26 PROCEDURE — 90832 PSYTX W PT 30 MINUTES: CPT | Performed by: SOCIAL WORKER

## 2018-10-26 ASSESSMENT — ANXIETY QUESTIONNAIRES
6. BECOMING EASILY ANNOYED OR IRRITABLE: NEARLY EVERY DAY
IF YOU CHECKED OFF ANY PROBLEMS ON THIS QUESTIONNAIRE, HOW DIFFICULT HAVE THESE PROBLEMS MADE IT FOR YOU TO DO YOUR WORK, TAKE CARE OF THINGS AT HOME, OR GET ALONG WITH OTHER PEOPLE: SOMEWHAT DIFFICULT
5. BEING SO RESTLESS THAT IT IS HARD TO SIT STILL: NEARLY EVERY DAY
2. NOT BEING ABLE TO STOP OR CONTROL WORRYING: NEARLY EVERY DAY
7. FEELING AFRAID AS IF SOMETHING AWFUL MIGHT HAPPEN: NEARLY EVERY DAY
1. FEELING NERVOUS, ANXIOUS, OR ON EDGE: MORE THAN HALF THE DAYS
3. WORRYING TOO MUCH ABOUT DIFFERENT THINGS: NEARLY EVERY DAY
GAD7 TOTAL SCORE: 19

## 2018-10-26 ASSESSMENT — PATIENT HEALTH QUESTIONNAIRE - PHQ9
5. POOR APPETITE OR OVEREATING: MORE THAN HALF THE DAYS
SUM OF ALL RESPONSES TO PHQ QUESTIONS 1-9: 20

## 2018-10-26 NOTE — MR AVS SNAPSHOT
After Visit Summary   10/26/2018    Nena Tang    MRN: [id number]           Patient Information     Date Of Birth          1960        Visit Information        Provider Department      10/26/2018 11:30 AM Richardson Lopez, [facility]        Today's Diagnoses     Schizoaffective disorder, depressive type (H)    -  1    Posttraumatic stress disorder           Follow-ups after your visit        Your next 10 appointments already scheduled     Oct 29, 2018  1:00 PM CDT   Ortho Treatment with Kiko Gallego PT   [facility] ([facility])    [address] [facility] [address]   [phone]            Nov 01, 2018  9:30 AM CDT   Return Visit with ART Arias CNP   [facility] ([facility])    [address] [place] [address]  [address] [place] [id number] [phone]            Nov 01, 2018  9:30 AM CDT   Return Visit with Richardson Lopez [facility] ([facility])    [address] [place] [address]  [address] [place] [phone]            Nov 01, 2018  9:30 AM CDT   Office Visit with Eugenia De Jesus [facility] ([facility])    [address] [place] [address] [address] [place] [phone]           Bring a current list of meds and any records pertaining to this visit. For Physicals, please bring immunization records and any forms needing to be filled out. Please arrive 10 minutes early to complete paperwork.            Nov 30, 2018  1:30 PM CST   Return Visit with Kraig Pedersen MD   [facility] ([facility])    [address] [facility] [phone] [phone]            Mar 14, 2019  3:15 PM CDT   RETURN RETINA with  Tiburcio Chacon MD   [facility] Clinic ([facility])    [address]  [address] Clin [address]  [facility] [id number] [phone]              Who to contact     If you have questions or need follow up information about today's clinic visit or your schedule please contact [facility] directly at [phone].  Normal or non-critical lab and imaging results will be communicated to you by [facility], letter or phone within 4 business days after the clinic has received the results. If you do not hear from us within 7 days, please contact the clinic through [facility] or phone. If you have a critical or abnormal lab result, we will notify you by phone as soon as possible.  Submit refill requests through [employer] or call your pharmacy and they will forward the refill request to us. Please allow 3 business days for your refill to be completed.          Additional Information About Your Visit        [facility] Information     [employer] gives you secure access to your electronic health record. If you see a primary care provider, you can also send messages to your care team and make appointments. If you have questions, please call your primary care clinic.  If you do not have a primary care provider, please call [phone] and they will assist you.        Care EveryWhere ID     This is your Care EveryWhere ID. This could be used by other organizations to access your [place] medical records  [id number]        Your Vitals Were     Last Period                   01/06/2015            Blood Pressure from Last 3 Encounters:   10/26/18 130/78   10/21/18 172/72   10/05/18 130/78    Weight from Last 3 Encounters:   10/26/18 107 kg (236 lb)   10/17/18 108.9 kg (240 lb)   10/05/18 108 kg (238 lb)              Today, you had the following     No orders found for display         Where to get your medicines      These medications were sent to [facility]  [address] - [place], [place] - [address] [facility] [address] [facility]  [address] [facility] [address], [place] [address]     Phone:  [phone]     guaiFENesin 20 mg/mL Soln solution          Primary Care Provider Office Phone # Fax #    ART Arias [phone]       [address]  [facility] [address]        Goals        General    Medical (pt-stated)     Notes - Note created  7/7/2016  9:15 AM by Chelsea Pulido, RN    Get blood sugars under control    Improve medication compliance    As of today's date 7/7/2016 goal is met at 0 - 25%.   Goal Status:  Active          Equal Access to Services     RAMESH GARRIDO : Hadii samira shane hadasho Soomaali, waaxda luqadaha, qaybta kaalmada adeegyada, waxmichelle bacain cara ellison . So [facility] [phone].    ATENCIÓN: Si habla español, tiene a trinh disposición servicios gratuitos de asistencia lingüística. Llame al [phone].    We comply with applicable federal civil rights laws and [place] laws. We do not discriminate on the basis of race, color, national origin, age, disability, sex, sexual orientation, or gender identity.            Thank you!     Thank you for choosing [facility]  for your care. Our goal is always to provide you with excellent care. Hearing back from our patients is one way we can continue to improve our services. Please take a few minutes to complete the written survey that you may receive in the mail after your visit with us. Thank you!             Your Updated Medication List - Protect others around you: Learn how to safely use, store and throw away your medicines at www.disposemymeds.org.          This list is accurate as of 10/26/18 12:43 PM.  Always use your most recent med list.                   Brand Name Dispense Instructions for use Diagnosis    acetaminophen 500 MG tablet    TYLENOL    100 tablet    Take 2 tablets (1,000 mg) by mouth 3 times daily as needed for mild pain    Chronic low back pain,  unspecified back pain laterality, with sciatica presence unspecified       albuterol 108 (90 Base) MCG/ACT inhaler    PROAIR HFA/PROVENTIL HFA/VENTOLIN HFA    1 Inhaler    Inhale 2 puffs into the lungs every 6 hours as needed for shortness of breath / dyspnea or wheezing    Pneumonia, organism unspecified(486)       aspirin 81 MG EC tablet     28 tablet    TAKE 1 TABLET (81MG) BY MOUTH DAILY    Coronary artery disease involving native heart with angina pectoris, unspecified vessel or lesion type (H)       atorvastatin 80 MG tablet    LIPITOR    30 tablet    TAKE 1 TABLET (80MG) BY MOUTH DAILY    Hyperlipidemia LDL goal <100       benztropine 0.5 MG tablet    COGENTIN    60 tablet    Take 1 tablet (0.5 mg) by mouth 2 times daily    Extrapyramidal and movement disorder       calcium carbonate 600 mg (elemental) 1500 (600 Ca) MG tablet    OS-ALLEN    180 tablet    Take 1 tablet (1,500 mg) by mouth daily    Other osteoporosis without current pathological fracture       dulaglutide 1.5 MG/0.5ML pen    TRULICITY    2 mL    Inject 1.5 mg Subcutaneous every 7 days    Type 2 diabetes mellitus with mild nonproliferative retinopathy without macular edema, with long-term current use of insulin, unspecified laterality (H)       fluticasone 50 MCG/ACT spray    FLONASE    1 Bottle    Spray 1-2 sprays into both nostrils daily    Seasonal allergic rhinitis, unspecified chronicity, unspecified trigger       gabapentin 300 MG capsule    NEURONTIN    168 capsule    TAKE 2 CAPSULES (600MG) BY MOUTH THREE TIMES A DAY    Type 2 diabetes mellitus with diabetic neuropathy, with long-term current use of insulin (H)       glucose 4 g Chew chewable tablet     20 tablet    Take 2 every 15 minutes for blood sugar <70mg/dL. Recheck blood sugar every 15 minutes until above 70mg/dL, then eat a substantial meal.    Type 2 diabetes mellitus with mild nonproliferative retinopathy without macular edema, with long-term current use of insulin, unspecified  laterality (H)       guaiFENesin 20 mg/mL Soln solution    ROBITUSSIN    236 mL    Take 10 mLs by mouth every 6 hours as needed for cough    Productive cough       ibuprofen 600 MG tablet    ADVIL/MOTRIN    60 tablet    Take 1 tablet (600 mg) by mouth every 4 hours as needed for moderate pain    Closed fracture of one rib of right side, initial encounter       insulin aspart 100 UNIT/ML injection    NovoLOG FLEXPEN    15 mL    Inject 20 Units Subcutaneous 3 times daily (with meals) Once daily, can add additional 5 units if BGs are >500mg/dL.    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, with long-term current use of insulin, unspecified laterality (H)       ipratropium - albuterol 0.5 mg/2.5 mg/3 mL 0.5-2.5 (3) MG/3ML neb solution    DUONEB    30 vial    Take 1 vial (3 mLs) by nebulization every 6 hours as needed for shortness of breath / dyspnea or wheezing    Wheezing       losartan 100 MG tablet    COZAAR    28 tablet    TAKE 1 TABLET (100MG) BY MOUTH DAILY    Coronary artery disease involving native heart with angina pectoris, unspecified vessel or lesion type (H)       melatonin 5 MG Caps     180 capsule    Take 2 capsules by mouth daily At dinnertime    Insomnia, unspecified type       metoprolol succinate 25 MG 24 hr tablet    TOPROL-XL    30 tablet    Take 1 tablet (25 mg) by mouth daily    Coronary artery disease involving native heart with angina pectoris, unspecified vessel or lesion type (H)       paliperidone 9 MG 24 hr tablet    INVEGA    30 tablet    Take 1 tablet (9 mg) by mouth every morning    Schizoaffective disorder, bipolar type (H)       polyethylene glycol powder    MIRALAX/GLYCOLAX    527 g     TAKE 8.5 GRAMS (1/2 CAPFUL) BY MOUTH DAILY    Constipation, unspecified constipation type       ranitidine 300 MG tablet    ZANTAC    90 tablet    TAKE 1 TABLET (300MG) BY MOUTH AT BEDTIME    Gastroesophageal reflux disease without esophagitis       senna-docusate 8.6-50 MG per tablet    SENOKOT-S;PERICOLACE    100 tablet    Take 1 tablet by mouth 2 times daily as needed for constipation    Drug-induced constipation       sertraline 100 MG tablet    ZOLOFT    60 tablet    Take 2 tablets (200 mg) by mouth daily    Schizoaffective disorder, bipolar type (H)       SUMAtriptan 25 MG tablet    IMITREX    12 tablet    Take 1-2 tablets (25-50 mg) by mouth at onset of headache for migraine May repeat in 2 hours. Max 8 tablets/24 hours.    Migraine with aura and without status migrainosus, not intractable       topiramate 25 MG tablet    TOPAMAX    120 tablet    Take 2 tablets (50 mg) by mouth 2 times daily    Morbid obesity (H)       vitamin D3 67571 UNITS capsule    CHOLECALCIFEROL    12 capsule    TAKE 1 CAPSULE (50,000 UNITS) MONTHLY    Vitamin D deficiency

## 2018-10-26 NOTE — MR AVS SNAPSHOT
After Visit Summary   10/26/2018    Nena Tang    MRN: [id number]           Patient Information     Date Of Birth          1960        Visit Information        Provider Department      10/26/2018 11:30 AM Rebecca Carr,  [facility]        Today's Diagnoses     Productive cough          Care Instructions    Thank you for coming in today!  I'm glad you are recovering from you hospitalization.    Here is a brief summary of the plan we discussed:  1.  Breathing:  Use the Incentive Spirometer (breathing tube from hospital) every hour for the next week.  2.  Cough:  I sent a refill of the cough medicine to the pharmacy.  Cough will slowly improve over several week.    3.  Sleep:  Start using CPAP again - we talked about plan to start using on Monday night.    4.  Bloody nose:  Apply thin layer of vaseline to inside of nostrils twice daily.  Humidifier may also help.  5.  Blood sugars:  Continue current insulin regimen.  Hold Novolog if not eating (appetite should continue to improve).    Follow-up with Eugenia Guaman (pharmacist), and Richardson next week as scheduled.  Bring glucose log to that visit.            Follow-ups after your visit        Follow-up notes from your care team     Return in about 6 days (around 11/1/2018) for Diabetes Follow-up, Co-Visit with phajarett, Co-Visit with [facility].      Your next 10 appointments already scheduled     Oct 29, 2018  1:00 PM CDT   Ortho Treatment with Kiko Gallego PT   [facility] ([facility])    [address] [facility] [address]   [phone]            Nov 01, 2018  9:30 AM CDT   Return Visit with ART Arias CNP   [facility] ([facility])    [address]  [address]  [facility] [address]   [phone]            Nov 01, 2018  9:30 AM CDT   Return Visit with BIN Mondragon    [facility] ([facility] [place] [place])    [address]  [address]  [facility] [address] [phone]            Nov 01, 2018  9:30 AM CDT   Office Visit with Eugenia De Jesus RICHADR   [facility] ([facility])    [address] [place] [address]  [address]  [facility] [id number] [phone]           Bring a current list of meds and any records pertaining to this visit. For Physicals, please bring immunization records and any forms needing to be filled out. Please arrive 10 minutes early to complete paperwork.            Nov 30, 2018  1:30 PM CST   Return Visit with Kraig Pedersen MD   [facility] [place] ([facility] [place])    [address]  [facility] [address] [phone]            Mar 14, 2019  3:15 PM CDT   RETURN RETINA with Tiburcio Chacon MD   [facility] ([facility])    [address]  [facility] [address] [phone]              Who to contact     If you have questions or need follow up information about today's clinic visit or your schedule please contact [facility] directly at [phone].  Normal or non-critical lab and imaging results will be communicated to you by MyChart, letter or phone within 4 business days after the clinic has received the results. If you do not hear from us within 7 days, please contact the clinic through [facility] or phone. If you have a critical or abnormal lab result, we will notify you by phone as soon as possible.  Submit refill requests through [facility] or call your pharmacy and they will forward the refill request to us. Please allow 3 business days for your refill to be completed.          Additional Information About Your Visit        [facility] Information     [facility] gives you secure access to  your electronic health record. If you see a primary care provider, you can also send messages to your care team and make appointments. If you have questions, please call your primary care clinic.  If you do not have a primary care provider, please call [phone] and they will assist you.        Care EveryWhere ID     This is your Care EveryWhere ID. This could be used by other organizations to access your [facility] medical records  [id number]        Your Vitals Were     Pulse Temperature Respirations Height Last Period Pulse Oximetry    84 98.4  F (36.9  C) (Oral) 16 5' (1.524 m) 01/06/2015 96%    BMI (Body Mass Index)                   46.09 kg/m2            Blood Pressure from Last 3 Encounters:   10/26/18 130/78   10/21/18 172/72   10/05/18 130/78    Weight from Last 3 Encounters:   10/26/18 236 lb (107 kg)   10/17/18 240 lb (108.9 kg)   10/05/18 238 lb (108 kg)              Today, you had the following     No orders found for display         Where to get your medicines      These medications were sent to [facility] [place] [address] [place] - [address]  [address], [place] [address]     Phone:  [phone]     guaiFENesin 20 mg/mL Soln solution          Primary Care Provider Office Phone # Fax #    Rowena HaasART [phone]       [address] [facility] [address]  [facility] [address]        Goals        General    Medical (pt-stated)     Notes - Note created  7/7/2016  9:15 AM by Chelsea Pulido, RN    Get blood sugars under control    Improve medication compliance    As of today's date 7/7/2016 goal is met at 0 - 25%.   Goal Status:  Active          Equal Access to Services     RAMESH GARRIDO : Hadii samira Rios, wadeejayda davidadaha, qaybta kaalmalorena anderson. So [facility] [phone].    ATENCIÓN: Si habla español, tiene a trinh disposición servicios gratuitos de asistencia lingüística. Llame al  [phone].    We comply with applicable federal civil rights laws and [place] laws. We do not discriminate on the basis of race, color, national origin, age, disability, sex, sexual orientation, or gender identity.            Thank you!     Thank you for choosing [facility]  for your care. Our goal is always to provide you with excellent care. Hearing back from our patients is one way we can continue to improve our services. Please take a few minutes to complete the written survey that you may receive in the mail after your visit with us. Thank you!             Your Updated Medication List - Protect others around you: Learn how to safely use, store and throw away your medicines at www.disposemymeds.org.          This list is accurate as of 10/26/18 12:03 PM.  Always use your most recent med list.                   Brand Name Dispense Instructions for use Diagnosis    acetaminophen 500 MG tablet    TYLENOL    100 tablet    Take 2 tablets (1,000 mg) by mouth 3 times daily as needed for mild pain    Chronic low back pain, unspecified back pain laterality, with sciatica presence unspecified       albuterol 108 (90 Base) MCG/ACT inhaler    PROAIR HFA/PROVENTIL HFA/VENTOLIN HFA    1 Inhaler    Inhale 2 puffs into the lungs every 6 hours as needed for shortness of breath / dyspnea or wheezing    Pneumonia, organism unspecified(486)       aspirin 81 MG EC tablet     28 tablet    TAKE 1 TABLET (81MG) BY MOUTH DAILY    Coronary artery disease involving native heart with angina pectoris, unspecified vessel or lesion type (H)       atorvastatin 80 MG tablet    LIPITOR    30 tablet    TAKE 1 TABLET (80MG) BY MOUTH DAILY    Hyperlipidemia LDL goal <100       benztropine 0.5 MG tablet    COGENTIN    60 tablet    Take 1 tablet (0.5 mg) by mouth 2 times daily    Extrapyramidal and movement disorder       calcium carbonate 600 mg (elemental) 1500 (600 Ca) MG tablet    OS-ALLEN    180 tablet    Take 1  tablet (1,500 mg) by mouth daily    Other osteoporosis without current pathological fracture       dulaglutide 1.5 MG/0.5ML pen    TRULICITY    2 mL    Inject 1.5 mg Subcutaneous every 7 days    Type 2 diabetes mellitus with mild nonproliferative retinopathy without macular edema, with long-term current use of insulin, unspecified laterality (H)       fluticasone 50 MCG/ACT spray    FLONASE    1 Bottle    Spray 1-2 sprays into both nostrils daily    Seasonal allergic rhinitis, unspecified chronicity, unspecified trigger       gabapentin 300 MG capsule    NEURONTIN    168 capsule    TAKE 2 CAPSULES (600MG) BY MOUTH THREE TIMES A DAY    Type 2 diabetes mellitus with diabetic neuropathy, with long-term current use of insulin (H)       glucose 4 g Chew chewable tablet     20 tablet    Take 2 every 15 minutes for blood sugar <70mg/dL. Recheck blood sugar every 15 minutes until above 70mg/dL, then eat a substantial meal.    Type 2 diabetes mellitus with mild nonproliferative retinopathy without macular edema, with long-term current use of insulin, unspecified laterality (H)       guaiFENesin 20 mg/mL Soln solution    ROBITUSSIN    236 mL    Take 10 mLs by mouth every 6 hours as needed for cough    Productive cough       ibuprofen 600 MG tablet    ADVIL/MOTRIN    60 tablet    Take 1 tablet (600 mg) by mouth every 4 hours as needed for moderate pain    Closed fracture of one rib of right side, initial encounter       insulin aspart 100 UNIT/ML injection    NovoLOG FLEXPEN    15 mL    Inject 20 Units Subcutaneous 3 times daily (with meals) Once daily, can add additional 5 units if BGs are >500mg/dL.    Type 2 diabetes mellitus with mild nonproliferative retinopathy without macular edema, with long-term current use of insulin, unspecified laterality (H)       insulin glargine 100 UNIT/ML injection    LANTUS    3 mL    Inject 48 Units Subcutaneous At Bedtime    Type 2 diabetes mellitus with mild nonproliferative retinopathy  without macular edema, with long-term current use of insulin, unspecified laterality (H)       ipratropium - albuterol 0.5 mg/2.5 mg/3 mL 0.5-2.5 (3) MG/3ML neb solution    DUONEB    30 vial    Take 1 vial (3 mLs) by nebulization every 6 hours as needed for shortness of breath / dyspnea or wheezing    Wheezing       losartan 100 MG tablet    COZAAR    28 tablet    TAKE 1 TABLET (100MG) BY MOUTH DAILY    Coronary artery disease involving native heart with angina pectoris, unspecified vessel or lesion type (H)       melatonin 5 MG Caps     180 capsule    Take 2 capsules by mouth daily At dinnertime    Insomnia, unspecified type       metoprolol succinate 25 MG 24 hr tablet    TOPROL-XL    30 tablet    Take 1 tablet (25 mg) by mouth daily    Coronary artery disease involving native heart with angina pectoris, unspecified vessel or lesion type (H)       paliperidone 9 MG 24 hr tablet    INVEGA    30 tablet    Take 1 tablet (9 mg) by mouth every morning    Schizoaffective disorder, bipolar type (H)       polyethylene glycol powder    MIRALAX/GLYCOLAX    527 g    TAKE 8.5 GRAMS (1/2 CAPFUL) BY MOUTH DAILY    Constipation, unspecified constipation type       ranitidine 300 MG tablet    ZANTAC    90 tablet    TAKE 1 TABLET (300MG) BY MOUTH AT BEDTIME    Gastroesophageal reflux disease without esophagitis       senna-docusate 8.6-50 MG per tablet    SENOKOT-S;PERICOLACE    100 tablet    Take 1 tablet by mouth 2 times daily as needed for constipation    Drug-induced constipation       sertraline 100 MG tablet    ZOLOFT    60 tablet    Take 2 tablets (200 mg) by mouth daily    Schizoaffective disorder, bipolar type (H)       SUMAtriptan 25 MG tablet    IMITREX    12 tablet    Take 1-2 tablets (25-50 mg) by mouth at onset of headache for migraine May repeat in 2 hours. Max 8 tablets/24 hours.    Migraine with aura and without status migrainosus, not intractable       topiramate 25 MG tablet    TOPAMAX    120 tablet    Take 2  tablets (50 mg) by mouth 2 times daily    Morbid obesity (H)       vitamin D3 08087 UNITS capsule    CHOLECALCIFEROL    12 capsule    TAKE 1 CAPSULE (50,000 UNITS) MONTHLY    Vitamin D deficiency

## 2018-10-26 NOTE — PROGRESS NOTES
SUBJECTIVE:                                                    Nena Tang is a 58 year old female who presents to clinic today for the following health issues:  [facility]: [facility] Follow-up Visit:    Hospital/Nursing Home/IP Rehab Facility: [facility]  Date of Admission: 10/17/18  Date of Discharge: 10/21/18  Reason(s) for Admission: Discharge Diagnosis:    Fever, chills, respiratory symptoms with cough, shortness of breath, leukocytosis, tachycardia, lactic acidosis with suspected early sepsis likely from possible underlying pneumonia  Insulin requiring diabetes mellitus  History of CAD  History of schizoaffective disorder  Hypertension                    Problems taking medications regularly:  None       Medication changes since discharge: Amoxicillin Clavulanate and Robitussin        Problems adhering to non-medication therapy:  None    Summary of hospitalization:  [facility] discharge summary reviewed - Hospitalized for sepsis 2/2 pneumonia.  Did not require O2.  Discharged on Augmentin which she has completed.    Diagnostic Tests/Treatments reviewed.  Follow up needed: none  Other Healthcare Providers Involved in Patient s Care:         MTM  Update since discharge: improved. Improved breathing and cough.  No further fevers or chills.  Appetite is improving though not quite to baseline.  Some upset stomach but that is improving.  Complains of blood noses for past few days.    Post Discharge Medication Reconciliation: discharge medications reconciled, continue medications without change.  Plan of care communicated with patient     Coding guidelines for this visit:  Type of Medical   Decision Making Face-to-Face Visit       within 7 Days of discharge Face-to-Face Visit        within 14 days of discharge   Moderate Complexity [id number]   High Complexity [id number]            Mental Health Follow up     Status since last visit: more depressed, anxious    See PHQ-9 for current  symptoms.  Other associated symptoms: None    Complicating factors: recent illness  Significant life event:  Sister on ventilator, lost job at [employer] while in hospital  Current substance abuse:  None  Anxiety or Panic symptoms:  Anxiety    Sleep - has not been using CPAP so not sleeping well  Appetite - improving  Exercise - none    PHQ-9  English PHQ-9   Any Language           Diabetes Follow-up    Patient is checking blood sugars: four times daily.    Blood sugar testing frequency justification: Uncontrolled diabetes, Risk of hypoglycemia with medication(s) and Patient modifying lifestyle changes (diet, exercise) with blood sugars  Results are as follows:         Blood glucose varying from 100s to 200s    Diabetic concerns:  With decreased appetite nursing is concerned about insulin dosing - have been holding novolog on mornings when she is not eating, no hypoglycemia noted     Symptoms of hypoglycemia (low blood sugar): none    BP Readings from Last 2 Encounters:   10/26/18 130/78   10/21/18 172/72     Hemoglobin A1C (%)   Date Value   08/06/2018 6.4 (H)   05/22/2018 6.2 (H)     LDL Cholesterol Calculated (mg/dL)   Date Value   08/06/2018 84   06/27/2017 64       Diabetes Management Resources      Problems taking medications regularly: No    Medication side effects: none    Diet: regular (no restrictions)    Social History   Substance Use Topics     Smoking status: Never Smoker     Smokeless tobacco: Never Used     Alcohol use No      Comment: last month        Problem list and histories reviewed & adjusted, as indicated.  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 (H)     Illiterate     Irritable bowel syndrome     overweight - BMI >35     Takotsubo cardiomyopathy     CAD (coronary artery disease)      Restless legs syndrome (RLS)     CINDY (obstructive sleep apnea)- mild AHI 10.3     Verbal auditory hallucination     Chronic low back pain     Schizoaffective disorder, depressive type (H)     Migraine headache     HTN, goal below 140/90     Health Care Home     Lumbago     Cervicalgia     Cocaine abuse, episodic (H)     Suicidal ideation     Esophageal reflux     Mild nonproliferative diabetic retinopathy (H)     Tardive dyskinesia     Alcohol use     Left cataract     Falls frequently     History of uterine cancer     Psychophysiological insomnia     Dysuria     Asymptomatic postmenopausal status     Abdominal pain, right lower quadrant     Sepsis (H)     Pneumonia of right lower lobe due to infectious organism (H)     Infectious encephalopathy     Non-intractable vomiting with nausea     Acute respiratory failure with hypoxia (H)     Thoughts of self harm     Gastroenteritis     Posttraumatic stress disorder     Cervical cancer screening     Type 2 diabetes mellitus with stage 3 chronic kidney disease, with long-term current use of insulin (H)     Past Surgical History:   Procedure Laterality Date     C OOPHORECTORMY FOR RAHEL, W/BX  1983    UTERINE     CATARACT IOL, RT/LT Bilateral 2017     COLONOSCOPY N/A 3/16/2017    Procedure: COLONOSCOPY;  Surgeon: Traci Gonzalez MD;  Location:  GI     Coronary CTA  5/21/2014     HYSTERECTOMY  1983    uterine cancer yearly pap's per provider.     LAPAROSCOPIC CHOLECYSTECTOMY  2008     PHACOEMULSIFICATION CLEAR CORNEA WITH STANDARD INTRAOCULAR LENS IMPLANT Left 5/5/2017    Procedure: PHACOEMULSIFICATION CLEAR CORNEA WITH STANDARD INTRAOCULAR LENS IMPLANT;  LEFT EYE PHACOEMULSIFICATION CLEAR CORNEA WITH STANDARD INTRAOCULAR LENS IMPLANT ;  Surgeon: Tyra Diaz MD;  Location: [facility]     PHACOEMULSIFICATION CLEAR CORNEA WITH STANDARD INTRAOCULAR LENS IMPLANT Right 6/30/2017    Procedure: PHACOEMULSIFICATION CLEAR CORNEA WITH STANDARD INTRAOCULAR LENS  IMPLANT;  RIGHT EYE PHACOEMULSIFICATION CLEAR CORNEA WITH STANDARD INTRAOCULAR LENS IMPLANT;  Surgeon: Tyra Diaz MD;  Location:  [facility]     RELEASE TRIGGER FINGER  10/11/2012    Left thumb. Procedure: RELEASE TRIGGER FINGER;  LEFT THUMB TRIGGER RELEASE;  Surgeon: Tay Langley MD;  Location:  [facility]     RELEASE TRIGGER FINGER Right 2016    Procedure: RELEASE TRIGGER FINGER;  Surgeon: Albino Castañeda MD;  Location: [facility] OR       Social History   Substance Use Topics     Smoking status: Never Smoker     Smokeless tobacco: Never Used     Alcohol use No      Comment: last month     Family History   Problem Relation Age of Onset     Cancer Mother      BLADDER     Respiratory Mother      COPD     GASTROINTESTINAL DISEASE Mother      CIRRHOSIS OF LI BOLIVAR     Alcohol/Drug Mother      Diabetes Mother      Hypertension Mother      Lipids Mother      C.A.D. Mother      Glaucoma Mother      Alcohol/Drug Sister      Mental Illness Sister      Alcohol/Drug Sister      Psychotic Disorder Sister      Cancer Maternal Grandmother      UNKNOWN TYPE     Cancer Brother      COLON     Cancer - colorectal Brother      IN HIS LATE 30S     Alcohol/Drug Brother       OF HEROIN OVERDOSE AT AGE 22 YRS     Macular Degeneration No family hx of            Current Outpatient Prescriptions   Medication Sig Dispense Refill     acetaminophen (TYLENOL) 500 MG tablet Take 2 tablets (1,000 mg) by mouth 3 times daily as needed for mild pain 100 tablet 3     albuterol (PROAIR HFA/PROVENTIL HFA/VENTOLIN HFA) 108 (90 Base) MCG/ACT Inhaler Inhale 2 puffs into the lungs every 6 hours as needed for shortness of breath / dyspnea or wheezing 1 Inhaler 0     aspirin 81 MG EC tablet TAKE 1 TABLET (81MG) BY MOUTH DAILY 28 tablet 3     atorvastatin (LIPITOR) 80 MG tablet TAKE 1 TABLET (80MG) BY MOUTH DAILY 30 tablet 11     benztropine (COGENTIN) 0.5 MG tablet Take 1 tablet (0.5 mg) by mouth 2 times daily 60 tablet 2     calcium carbonate (OS-ALLEN  600 MG [place]. CA) 1500 (600 CA) MG tablet Take 1 tablet (1,500 mg) by mouth daily 180 tablet 3     dulaglutide (TRULICITY) 1.5 MG/0.5ML pen Inject 1.5 mg Subcutaneous every 7 days 2 mL 3     fluticasone (FLONASE) 50 MCG/ACT spray Spray 1-2 sprays into both nostrils daily 1 Bottle 11     gabapentin (NEURONTIN) 300 MG capsule TAKE 2 CAPSULES (600MG) BY MOUTH THREE TIMES A  capsule 1     glucose 4 G CHEW chewable tablet Take 2 every 15 minutes for blood sugar <70mg/dL. Recheck blood sugar every 15 minutes until above 70mg/dL, then eat a substantial meal. 20 tablet 1     guaiFENesin (ROBITUSSIN) 20 mg/mL SOLN solution Take 10 mLs by mouth every 6 hours as needed for cough 236 mL 0     ibuprofen (ADVIL,MOTRIN) 600 MG tablet Take 1 tablet (600 mg) by mouth every 4 hours as needed for moderate pain 60 tablet 0     insulin aspart (NOVOLOG FLEXPEN) 100 UNIT/ML injection Inject 20 Units Subcutaneous 3 times daily (with meals) Once daily, can add additional 5 units if BGs are >500mg/dL. 15 mL 11     insulin glargine (LANTUS SOLOSTAR) 100 UNIT/ML pen Inject 48 Units Subcutaneous At Bedtime 3 mL 11     ipratropium - albuterol 0.5 mg/2.5 mg/3 mL (DUONEB) 0.5-2.5 (3) MG/3ML neb solution Take 1 vial (3 mLs) by nebulization every 6 hours as needed for shortness of breath / dyspnea or wheezing 30 vial 0     losartan (COZAAR) 100 MG tablet TAKE 1 TABLET (100MG) BY MOUTH DAILY 28 tablet 3     melatonin 5 MG CAPS Take 2 capsules by mouth daily At dinnertime 180 capsule 2     metoprolol succinate (TOPROL-XL) 25 MG 24 hr tablet Take 1 tablet (25 mg) by mouth daily 30 tablet 1     paliperidone (INVEGA) 9 MG 24 hr tablet Take 1 tablet (9 mg) by mouth every morning 30 tablet 2     polyethylene glycol (MIRALAX/GLYCOLAX) powder TAKE 8.5 GRAMS (1/2 CAPFUL) BY MOUTH DAILY 527 g 3     ranitidine (ZANTAC) 300 MG tablet TAKE 1 TABLET (300MG) BY MOUTH AT BEDTIME 90 tablet 1     senna-docusate (SENOKOT-S;PERICOLACE) 8.6-50 MG per tablet Take 1  tablet by mouth 2 times daily as needed for constipation 100 tablet 1     sertraline (ZOLOFT) 100 MG tablet Take 2 tablets (200 mg) by mouth daily 60 tablet 2     SUMAtriptan (IMITREX) 25 MG tablet Take 1-2 tablets (25-50 mg) by mouth at onset of headache for migraine May repeat in 2 hours. Max 8 tablets/24 hours. 12 tablet 1     topiramate (TOPAMAX) 25 MG tablet Take 2 tablets (50 mg) by mouth 2 times daily 120 tablet 3     vitamin D3 (CHOLECALCIFEROL) 44973 UNITS capsule TAKE 1 CAPSULE (50,000 UNITS) MONTHLY 12 capsule 1     Allergies   Allergen Reactions     Imidazole Antifungals Hives     Tolerates diflucan     Ketoprofen Itching     Pruritis to topical     Lisinopril Hives     Metformin Other (See Comments)     Patient hospitalized for lactic acidosis - admitting provider suspectd caused by metformin     Metronidazole Hives     Posaconazole Hives     Tolerates diflucan     Recent Labs   Lab Test  10/19/18   0743  10/18/18   0659  10/17/18   1531  08/06/18   1038   05/28/18   1625  05/22/18   1434  02/12/18   1408  02/08/18   2155   11/07/17   0801   06/27/17   1358   12/29/16   0909   07/13/16   1350   07/14/15   1215   A1C   --    --    --   6.4*   --    --   6.2*  6.8*   --    < >  8.9*   < >  7.0*   < >   --    < >   --    < >  9.1*   LDL   --    --    --   84   --    --    --    --    --    --    --    --   64   --    --    --   137*   --   78   HDL   --    --    --   46*   --    --    --    --    --    --    --    --   37*   --    --    --    --    --   39*   TRIG   --    --    --   215*   --    --    --    --    --    --    --    --   267*   --    --    --    --    --   151*   ALT   --    --   23   --    --   27   --    --   23   < >  23   < >  32   < >  26   < >   --    < >   --    CR  0.89  1.09*  1.04   --    < >  1.16*   --    --   1.09*   < >  0.88   < >  0.78   < >  0.72   < >   --    < >  0.67   GFRESTIMATED  66  52*  54*   --    < >  48*   --    --   52*   < >  66   < >  76   < >  83   < >   --     < >  >90  Non  GFR Calc     GFRESTBLACK  79  62  66   --    < >  58*   --    --   63   < >  80   < >  >90   GFR Calc     < >  >90   GFR Calc     < >   --    < >  >90   GFR Calc     POTASSIUM  3.9  3.6  4.3   --    < >  4.5   --    --   4.3   < >  3.9   < >  4.3   < >  4.2   < >   --    < >  4.3   TSH   --    --    --    --    --    --    --    --    --    --   3.21   --    --    --   2.24   < >   --    < >   --     < > = values in this interval not displayed.      BP Readings from Last 3 Encounters:   10/26/18 130/78   10/21/18 172/72   10/05/18 130/78    Wt Readings from Last 3 Encounters:   10/26/18 236 lb (107 kg)   10/17/18 240 lb (108.9 kg)   10/05/18 238 lb (108 kg)        Labs reviewed in EPIC  Problem list, Medication list, Allergies, and Medical/Social/Surgical histories reviewed in [facility] and updated as appropriate.     ROS: Constitutional, neuro, ENT, endocrine, pulmonary, cardiac, gastrointestinal, genitourinary, musculoskeletal, integument and psychiatric systems are negative, except as otherwise noted above in the HPI.     OBJECTIVE:                                                    /78  Pulse 84  Temp 98.4  F (36.9  C) (Oral)  Resp 16  Ht 5' (1.524 m)  Wt 236 lb (107 kg)  LMP 01/06/2015  SpO2 96%  BMI 46.09 kg/m2  Body mass index is 46.09 kg/(m^2).  GENERAL: healthy, alert, well nourished, well hydrated, no distress  EYES: normal conjunctiva  HENT: several scabbed areas along nasal septum bilaterally, moist mucus membranes  RESP: lungs clear to auscultation - no rales, no rhonchi, no wheezes  CV: regular rates and rhythm, no murmur   MS: no edema  SKIN: no suspicious lesions, no rashes  NEURO: alert and oriented, no focal deficits    Mental Status Assessment:  Appearance:   Appropriate   Eye Contact:   Good   Psychomotor Behavior: Normal   Attitude:   Cooperative   Orientation:   All  Speech   Rate / Production: Normal     Volume:  Normal   Mood:    Normal  Affect:    Appropriate   Thought Content:  Clear   Thought Form:  Coherent  Logical   Insight:    Fair   Attention Span and Concentration: adequate  Recent and Remote Memory:  intact  Fund of Knowledge: appropriate  Muscle Strength and Tone: normal     See [facility] notes     Diagnostic Test Results:  none      ASSESSMENT/PLAN:                                                      1. Hospital discharge follow-up  2. Productive cough  Improving slowly since discharge for presumed pneumonia.  Respiratory status is stable and lungs are clear.  Encouraged continued use of incentive spirometer.  She has completed antibiotics (which I suspect were contributing to upset stomach and loss of appetite).  She may continue Robitussin prn.  Advised her that cough may take several weeks to resolve.    - guaiFENesin (ROBITUSSIN) 20 mg/mL SOLN solution; Take 10 mLs by mouth every 6 hours as needed for cough  Dispense: 236 mL; Refill: 0    3. Type 2 diabetes mellitus with stage 3 chronic kidney disease, with long-term current use of insulin (H)  Nursing had concerns about blood sugars and insulin dosing s/p discharge given decreased PO intake.  Overall, review of blood sugars does not show any hypoglycemia and her appetite is improving so I do not think that any decreased insulin dosing is needed.  However, she is working on weight loss and so careful ongoing monitoring will be prudent.      4. CINDY (obstructive sleep apnea)- mild AHI 10.3  Patient verbalized that she would like to resume use of CPAP and notes that it does improve her sleep.  She plans to restart use next week.      5. Epistaxis  Suspect dry nasal mucosa.  Recommend thin layer of vaseline to nares.      Patient expressed agreement and understanding with plan as outlined above.  She will follow-up as planned with her PCP as scheduled on 11/1.      Rebecca Carr,   [facility] PRIMARY [facility]ARE

## 2018-10-26 NOTE — PROGRESS NOTES
[facility]   October 26, 2018      Behavioral Health Clinician Progress Note    Patient Name: Nena Tang           Service Type:  Individual      Service Location:   Face to Face in Clinic     Session Start Time: 11:30am  Session End Time: 11:55am      Session Length: 16 - 37      Attendees: Patient and PCP    Visit Activities (Refresh list every visit): [facility] [employer]    Diagnostic Assessment Date: 1-23-18  Treatment Plan Review Date: Not completed yet  See Flowsheets for today's PHQ-9 and TAMIA-7 results  Previous PHQ-9:   PHQ-9 SCORE 1/2/2017 2/3/2017 3/13/2018   Total Score - - -   Total Score - - -   Total Score 0 0 14     Previous TAMIA-7:   TAMIA-7 SCORE 1/2/2017 2/3/2017 3/13/2018   Total Score - - -   Total Score 0 0 17   Total Score - - -       ALEXANDRA LEVEL:  ALEXANDRA Score (Last Two) 8/30/2011 6/9/2014   ALEXANDRA Raw Score 42 37   Activation Score 66 49.9   ALEXANDRA Level 3 2       DATA  Extended Session (60+ minutes): No  Interactive Complexity: No  Crisis: No  [facility] Patient: No    Treatment Objective(s) Addressed in This Session:  Target Behavior(s): disease management/lifestyle changes mental health    Depressed Mood: Increase interest, engagement, and pleasure in doing things  Decrease frequency and intensity of feeling down, depressed, hopeless  Improve quantity and quality of night time sleep / decrease daytime naps  Feel less tired and more energy during the day   Identify negative self-talk and behaviors: challenge core beliefs, myths, and actions  Improve concentration, focus, and mindfulness in daily activities   Decrease thoughts that you'd be better off dead or of suicide / self-harm  Anxiety: will experience a reduction in anxiety, will develop more effective coping skills to manage anxiety symptoms, will develop healthy cognitive patterns and beliefs and will increase ability to function adaptively  Alcohol / Substance Use: continue to make healthy choices regarding substance use  and engage in activities / supportive services that promote sobriety  Thought Disturbance: will develop skills to more effectively manage symptoms, will develop more effective support systems and will continue to take medications as prescribed    Current Stressors / Issues:    [facility] met with patient at PCP request-she was recently admitted to the hospital due to acute issues-her sister is on a breathing machine and the patient reported that she is taking her sisters health issues hard-the patient is praying that her sister returns to health-the patient wants to go see her sister in the next 2 weeks-she is prepared for the worst happening-she will not hurt herself-she had a job before she went into the hospital and she lost the job when she was in the hospital-she is getting help finding a new job from her worker-no suicidal thoughts-trouble with sleeping and feeling down and low most of the time-she reported having low appetite-has been worrying a lot about her sister-not worried-not worried about the man I'm tough now-a little bit of hallucinations-regarding sleeping she reported getting about 6 hours of sleeping-     Progress on Treatment Objective(s) / Homework:  Minimal progress - ACTION (Actively working towards change); Intervened by reinforcing change plan / affirming steps taken    Motivational Interviewing    MI Intervention: Expressed Empathy/Understanding, Supported Autonomy, Collaboration, Evocation, Permission to raise concern or advise, Open-ended questions, Reflections: simple and complex, Rolled with resistance: Emphasized patient autonomy, Simple reflection and Evoked patient agenda and Change talk (evoked)     Change Talk Expressed by the Patient: Desire to change Ability to change Reasons to change Need to change Committment to change Activation Taking steps    Provider Response to Change Talk: E - Evoked more info from patient about behavior change, A - Affirmed patient's thoughts, decisions, or  attempts at behavior change, R - Reflected patient's change talk and S - Summarized patient's change talk statements      Care Plan review completed: No    Medication Review:  No changes to current psychiatric medication(s)   Medication Compliance:  NA    Changes in Health Issues:   None reported     Chemical Use Review:   Substance Use: Chemical use reviewed, no active concerns identified      Tobacco Use: No current tobacco use.      Assessment: Current Emotional / Mental Status (status of significant symptoms):  Risk status (Self / Other harm or suicidal ideation)  Patient has had a history of suicidal ideation: yes  Patient denies current fears or concerns for personal safety.  Patient denies current or recent suicidal ideation or behaviors.  Patient denies current or recent homicidal ideation or behaviors.  Patient denies current or recent self injurious behavior or ideation.  Patient denies other safety concerns.  A safety and risk management plan has not been developed at this time, however patient was encouraged to call [place] [phone] should there be a change in any of these risk factors.    Appearance:   Appropriate   Eye Contact:   Good   Psychomotor Behavior: Normal   Attitude:   Cooperative   Orientation:   All  Speech   Rate / Production: Normal    Volume:  Normal   Mood:    Normal  Affect:    Appropriate  Bright  Worrisome   Thought Content:  Clear   Thought Form:  Coherent   Insight:    Good     Diagnoses:  1. Schizoaffective disorder, depressive type (H)    2. Posttraumatic stress disorder        Collateral Reports Completed:  Not Applicable    Plan: (Homework, other):  Patient was given information about behavioral services and encouraged to schedule a follow up appointment with the clinic [facility] as needed.  She was also given information about mental health symptoms and treatment options .  CD Recommendations: Maintain Sobriety.    BIN George,  [facility]      ______________________________________________________________________

## 2018-10-26 NOTE — PATIENT INSTRUCTIONS
Thank you for coming in today!  I'm glad you are recovering from you hospitalization.    Here is a brief summary of the plan we discussed:  1.  Breathing:  Use the Incentive Spirometer (breathing tube from hospital) every hour for the next week.  2.  Cough:  I sent a refill of the cough medicine to the pharmacy.  Cough will slowly improve over several week.    3.  Sleep:  Start using CPAP again - we talked about plan to start using on Monday night.    4.  Bloody nose:  Apply thin layer of vaseline to inside of nostrils twice daily.  Humidifier may also help.  5.  Blood sugars:  Continue current insulin regimen.  Hold Novolog if not eating (appetite should continue to improve).    Follow-up with Eugenia Guaman (pharmacist), and [place] next week as scheduled.  Bring glucose log to that visit.

## 2018-10-27 ASSESSMENT — ANXIETY QUESTIONNAIRES: GAD7 TOTAL SCORE: 19

## 2018-11-01 ENCOUNTER — OFFICE VISIT (OUTPATIENT)
Dept: PHARMACY | Facility: CLINIC | Age: 58
End: 2018-11-01
Payer: COMMERCIAL

## 2018-11-01 ENCOUNTER — OFFICE VISIT (OUTPATIENT)
Dept: BEHAVIORAL HEALTH | Facility: CLINIC | Age: 58
End: 2018-11-01
Payer: MEDICARE

## 2018-11-01 VITALS — DIASTOLIC BLOOD PRESSURE: 78 MMHG | SYSTOLIC BLOOD PRESSURE: 128 MMHG

## 2018-11-01 DIAGNOSIS — E66.9 OBESITY, UNSPECIFIED OBESITY SEVERITY, UNSPECIFIED OBESITY TYPE: ICD-10-CM

## 2018-11-01 DIAGNOSIS — E11.65 TYPE 2 DIABETES MELLITUS WITH HYPERGLYCEMIA, WITH LONG-TERM CURRENT USE OF INSULIN (H): ICD-10-CM

## 2018-11-01 DIAGNOSIS — F43.10 POSTTRAUMATIC STRESS DISORDER: ICD-10-CM

## 2018-11-01 DIAGNOSIS — F25.1 SCHIZOAFFECTIVE DISORDER, DEPRESSIVE TYPE (H): Primary | ICD-10-CM

## 2018-11-01 DIAGNOSIS — J18.9 PNEUMONIA DUE TO INFECTIOUS ORGANISM, UNSPECIFIED LATERALITY, UNSPECIFIED PART OF LUNG: Primary | ICD-10-CM

## 2018-11-01 DIAGNOSIS — Z79.4 TYPE 2 DIABETES MELLITUS WITH HYPERGLYCEMIA, WITH LONG-TERM CURRENT USE OF INSULIN (H): ICD-10-CM

## 2018-11-01 PROCEDURE — 99607 MTMS BY PHARM ADDL 15 MIN: CPT | Performed by: PHARMACIST

## 2018-11-01 PROCEDURE — 90832 PSYTX W PT 30 MINUTES: CPT | Performed by: SOCIAL WORKER

## 2018-11-01 PROCEDURE — 99606 MTMS BY PHARM EST 15 MIN: CPT | Performed by: PHARMACIST

## 2018-11-01 RX ORDER — INHALER, ASSIST DEVICES
SPACER (EA) MISCELLANEOUS
Qty: 1 EACH | Refills: 0 | Status: SHIPPED | OUTPATIENT
Start: 2018-11-01 | End: 2019-07-28

## 2018-11-01 NOTE — PROGRESS NOTES
SUBJECTIVE/OBJECTIVE:                Nena Tang is a 58 year old female coming in for a transitions of care visit.  She was discharged from [facility] on 10/21/18 for sepsis likely 2/2 pneumonia.     Chief Complaint: Follow-up from 9/6/18.    Tobacco: No tobacco use  Alcohol: not currently using     Medication Adherence/Access:  no issues reported, set up and administered by group home staff, except when pt leaves group home (ex lunches at [facility], trip to [place] to see her sister)    Pneumonia: currently using guaifenesin cough syrup PRN which is somewhat helpful for cough. Has completed Augmentin. Symptoms have improved, continues to have a bothersome cough which is keeping her up at night. Has not tried albuterol, has a difficult time using MDI with good technique.     Diabetes:  Pt currently taking lantus 48u QHS, novolog 20u 1-2 times a day with meals and eating away from group home frequently (there are times she will forget to give Novolog injections until hours later, then gives another dose of Novolog within 1-2 hours- this occurred yesterday and resulted in BS 73), and trulicity 1.5mg subcutaneously each week.     Pt is not experiencing side effects.   SMBG: 3-4 times daily. Ranges (glucose log)  Date FBG/ 2hours post Lunch/2hours post Dinner /2hours post    10/30 153 484 285/73    10/29 168 274 254     111  330/315     188 216 /123     157  307/236     139 265 /253     179 100 /124       Patient is rarely experiencing hypoglycemia   Recent symptoms of high blood sugar? none  Eye exam: up to date  Foot exam: up to date  Microalbumin is < 30 mg/g. Pt is taking an ACEi/ARB.  Aspirin: Taking 81mg daily and denies side effects  Diet/Exercise:  Appetite is returning  Lab Results   Component Value Date    A1C 6.4 08/06/2018    A1C 6.2 05/22/2018    A1C 6.8 02/12/2018    A1C 8.9 12/09/2017    A1C 8.9 11/07/2017     Weight: currently taking topiramate 50mg BID and has noticed a reduction in appetite.  No SE reported. Feels like the medication is helpful with reducing portion sizes.     Today's Vitals: /78  LMP 01/06/2015    ASSESSMENT:                 Current medications were reviewed today.      Medication Adherence: good, needs improvement with Novolog - see below    pneumonia: improved. Encouraged trial of albuterol at bedtime to see if any benefit with cough. Education on inhaler technique and ordered spacer to use with MDI.     Diabetes: needs improvement. A1c at goal <7% but recent PPGs have been elevated likely 2/2 difficulty with adherence to Novolog. Encouraged adherence.    Weight: needs improvement. Continue topiramate and follow-up with weight loss clinic as planned.      PLAN:                  1. Use spacer with albuterol, try using before bed and monitor cough  2. Continue to work on improving adherence to Novolog when eating away from group home    I spent 30 minutes with this patient today. All changes were made via collaborative practice agreement with Rowena Haas. A copy of the visit note was provided to the patient's primary care provider.    Will follow up in 2-4 weeks.    The patient was given a summary of these recommendations as an after visit summary.    Eugenia De Jesus, PharmD, BCACP

## 2018-11-01 NOTE — PROGRESS NOTES
[facility] - Integrated Primary Care   November 1, 2018      Behavioral Health Clinician Progress Note    Patient Name: Nena Tang           Service Type:  Individual      Service Location:   Face to Face in Clinic     Session Start Time: 9:38am  Session End Time: 10:07am      Session Length: 16 - 37      Attendees: Patient and MTM    Visit Activities (Refresh list every visit): [facility]    Diagnostic Assessment Date: 1-23-18  Treatment Plan Review Date: Not completed yet  See Flowsheets for today's PHQ-9 and TAMIA-7 results  Previous PHQ-9:   PHQ-9 SCORE 2/3/2017 3/13/2018 10/26/2018   Total Score - - -   Total Score - - -   Total Score 0 14 20     Previous TAMIA-7:   TAMIA-7 SCORE 2/3/2017 3/13/2018 10/26/2018   Total Score - - -   Total Score 0 17 19   Total Score - - -       ALEXANDRA LEVEL:  ALEXANDRA Score (Last Two) 8/30/2011 6/9/2014   ALEXANDRA Raw Score 42 37   Activation Score 66 49.9   ALEXANDRA Level 3 2       DATA  Extended Session (60+ minutes): No  Interactive Complexity: No  Crisis: No  [facility] Patient: No    Treatment Objective(s) Addressed in This Session:  Target Behavior(s): disease management/lifestyle changes mental health    Depressed Mood: Increase interest, engagement, and pleasure in doing things  Decrease frequency and intensity of feeling down, depressed, hopeless  Improve quantity and quality of night time sleep / decrease daytime naps  Feel less tired and more energy during the day   Identify negative self-talk and behaviors: challenge core beliefs, myths, and actions  Improve concentration, focus, and mindfulness in daily activities   Decrease thoughts that you'd be better off dead or of suicide / self-harm  Anxiety: will experience a reduction in anxiety, will develop more effective coping skills to manage anxiety symptoms, will develop healthy cognitive patterns and beliefs and will increase ability to function adaptively  Alcohol / Substance Use: continue to make healthy choices regarding substance  use and engage in activities / supportive services that promote sobriety  Thought Disturbance: will develop skills to more effectively manage symptoms, will develop more effective support systems and will continue to take medications as prescribed    Current Stressors / Issues:    The patient reported that she recently had health acute need and went to the hospital and was admitted to hospital for treatment-she reported that she has not used her CPAP machine and this is due to having a cough that has persisted since she had the pneumonia-the MTM recommended the patient using the inhaler to help treat her cough-she has been having difficulty using her inhaler appropriately-regarding sleeping she reported having hard time falling asleep-when she is trying to sleep she is thinking about things-her sister is doing better with her health-we had discussion of use of mindful objects to help with relaxing the mind and body-appetite has improved and she is focused on eating less portions-no suicidal thoughts-worrying less-no panic attacks-no chemical use-she has been spending time with her sons and grand children-she has been having some difficulty managing her blood sugars with medicine and has been having high blood sugar numbers-sometime she has an attitude of I don't care regarding blood sugars management.        Progress on Treatment Objective(s) / Homework:  Minimal progress - ACTION (Actively working towards change); Intervened by reinforcing change plan / affirming steps taken    Motivational Interviewing    MI Intervention: Expressed Empathy/Understanding, Supported Autonomy, Collaboration, Evocation, Permission to raise concern or advise, Open-ended questions, Reflections: simple and complex, Rolled with resistance: Emphasized patient autonomy, Simple reflection and Evoked patient agenda and Change talk (evoked)     Change Talk Expressed by the Patient: Desire to change Ability to change Reasons to change Need to  change Committment to change Activation Taking steps    Provider Response to Change Talk: E - Evoked more info from patient about behavior change, A - Affirmed patient's thoughts, decisions, or attempts at behavior change, R - Reflected patient's change talk and S - Summarized patient's change talk statements      Care Plan review completed: No    Medication Review:  No changes to current psychiatric medication(s)     Medication Compliance:  NA    Changes in Health Issues:   None reported     Chemical Use Review:   Substance Use: Chemical use reviewed, no active concerns identified      Tobacco Use: No current tobacco use.      Assessment: Current Emotional / Mental Status (status of significant symptoms):  Risk status (Self / Other harm or suicidal ideation)  Patient has had a history of suicidal ideation: yes  Patient denies current fears or concerns for personal safety.  Patient denies current or recent suicidal ideation or behaviors.  Patient denies current or recent homicidal ideation or behaviors.  Patient denies current or recent self injurious behavior or ideation.  Patient denies other safety concerns.  A safety and risk management plan has not been developed at this time, however patient was encouraged to call [place] [phone] should there be a change in any of these risk factors.    Appearance:   Appropriate   Eye Contact:   Good   Psychomotor Behavior: Normal   Attitude:   Cooperative   Orientation:   All  Speech   Rate / Production: Normal    Volume:  Normal   Mood:    Normal  Affect:    Appropriate  Bright  Worrisome   Thought Content:  Clear   Thought Form:  Coherent   Insight:    Good     Diagnoses:  1. Schizoaffective disorder, depressive type (H)    2. Posttraumatic stress disorder        Collateral Reports Completed:  Not Applicable    Plan: (Homework, other):  Patient was given information about behavioral services and encouraged to schedule a follow up appointment with the clinic [facility] as  needed.  She was also given information about mental health symptoms and treatment options .  CD Recommendations: Maintain Sobriety.    BIN George, [facility]      ______________________________________________________________________

## 2018-11-01 NOTE — MR AVS SNAPSHOT
After Visit Summary   11/1/2018    Nena Tang    MRN: [id number]           Patient Information     Date Of Birth          1960        Visit Information        Provider Department      11/1/2018 9:30 AM Richardson Lopez, [facility]        Today's Diagnoses     Schizoaffective disorder, depressive type (H)    -  1    Posttraumatic stress disorder           Follow-ups after your visit        Your next 10 appointments already scheduled     Nov 27, 2018 10:30 AM CST   Return Visit with ART Arias CNP   [facility] ([facility]    [address]  [address]  [facility] [address]   [phone]            Nov 27, 2018 10:30 AM CST   Return Visit with Richardson Lopez [facility] ([facility])    [address]  [address]  [facility] [address] [phone]            Nov 27, 2018 10:30 AM CST   SHORT with Eugenia De Jesus [facility] ([facility])    [address] [place] [address]  [address]  [facility] [address] [phone]            Nov 30, 2018  1:30 PM CST   Return Visit with Kraig Pedersen MD   [facility] ([facility])    [address]  [facility] [address] [phone]            Mar 14, 2019  3:15 PM CDT   RETURN RETINA with Tiburcio Chacon MD   [facility] ([facility])    [address]  [facility] [address]   [phone]              Who to contact     If you have questions or need follow up information about today's clinic visit or your schedule please contact [facility] directly at [phone].  Normal or non-critical lab and imaging results will be  communicated to you by [employer], letter or phone within 4 business days after the clinic has received the results. If you do not hear from us within 7 days, please contact the clinic through [employer] or phone. If you have a critical or abnormal lab result, we will notify you by phone as soon as possible.  Submit refill requests through [employer] or call your pharmacy and they will forward the refill request to us. Please allow 3 business days for your refill to be completed.          Additional Information About Your Visit        [employer] Information     [employer] gives you secure access to your electronic health record. If you see a primary care provider, you can also send messages to your care team and make appointments. If you have questions, please call your primary care clinic.  If you do not have a primary care provider, please call [phone] and they will assist you.        Care EveryWhere ID     This is your Care EveryWhere ID. This could be used by other organizations to access your [place] medical records  [id number]        Your Vitals Were     Last Period                   01/06/2015            Blood Pressure from Last 3 Encounters:   11/01/18 128/78   10/26/18 130/78   10/21/18 172/72    Weight from Last 3 Encounters:   10/26/18 107 kg (236 lb)   10/17/18 108.9 kg (240 lb)   10/05/18 108 kg (238 lb)              Today, you had the following     No orders found for display         Today's Medication Changes          These changes are accurate as of 11/1/18  2:24 PM.  If you have any questions, ask your nurse or doctor.               Start taking these medicines.        Dose/Directions    spacer holding chamber   Used for:  Pneumonia due to infectious organism, unspecified laterality, unspecified part of lung   Started by:  Eugenia De Jesus [facility]        Holding/spacer device to use with inhaler.   Quantity:  1 each   Refills:  0            Where to get your medicines      These medications were sent  to [facility] [address] - [place], [place] - [address] [facility]  [address], [place] [address]     Phone:  [phone]     [facility]                Primary Care Provider Office Phone # Fax #    ART Arias [phone]       [address] [facility] [address]  [facility] [address]        Goals        General    Medical (pt-stated)     Notes - Note created  7/7/2016  9:15 AM by Chelsea Pulido, RN    Get blood sugars under control    Improve medication compliance    As of today's date 7/7/2016 goal is met at 0 - 25%.   Goal Status:  Active          Equal Access to Services     RAMESH GARRIDO : Hadii samira shane hadasho Soomaali, waaxda luqadaha, qaybta kaalmada adeegyada, waxmichelle ellison . So [facility] [phone].    ATENCIÓN: Si habla español, tiene a trinh disposición servicios gratuitos de asistencia lingüística. Llame al [phone].    We comply with applicable federal civil rights laws and [place] laws. We do not discriminate on the basis of race, color, national origin, age, disability, sex, sexual orientation, or gender identity.            Thank you!     Thank you for choosing [facility]  for your care. Our goal is always to provide you with excellent care. Hearing back from our patients is one way we can continue to improve our services. Please take a few minutes to complete the written survey that you may receive in the mail after your visit with us. Thank you!             Your Updated Medication List - Protect others around you: Learn how to safely use, store and throw away your medicines at www.disposemymeds.org.          This list is accurate as of 11/1/18  2:24 PM.  Always use your most recent med list.                   Brand Name Dispense Instructions for use Diagnosis    acetaminophen 500 MG tablet    TYLENOL    100 tablet    Take 2 tablets (1,000 mg) by mouth 3 times daily as needed for mild pain     Chronic low back pain, unspecified back pain laterality, with sciatica presence unspecified       albuterol 108 (90 Base) MCG/ACT inhaler    PROAIR HFA/PROVENTIL HFA/VENTOLIN HFA    1 Inhaler    Inhale 2 puffs into the lungs every 6 hours as needed for shortness of breath / dyspnea or wheezing    Pneumonia, organism unspecified(486)       aspirin 81 MG EC tablet     28 tablet    TAKE 1 TABLET (81MG) BY MOUTH DAILY    Coronary artery disease involving native heart with angina pectoris, unspecified vessel or lesion type (H)       atorvastatin 80 MG tablet    LIPITOR    30 tablet    TAKE 1 TABLET (80MG) BY MOUTH DAILY    Hyperlipidemia LDL goal <100       benztropine 0.5 MG tablet    COGENTIN    60 tablet    Take 1 tablet (0.5 mg) by mouth 2 times daily    Extrapyramidal and movement disorder       calcium carbonate 600 mg (elemental) 1500 (600 Ca) MG tablet    OS-ALLEN    180 tablet    Take 1 tablet (1,500 mg) by mouth daily    Other osteoporosis without current pathological fracture       dulaglutide 1.5 MG/0.5ML pen    TRULICITY    2 mL    Inject 1.5 mg Subcutaneous every 7 days    Type 2 diabetes mellitus with mild nonproliferative retinopathy without macular edema, with long-term current use of insulin, unspecified laterality (H)       fluticasone 50 MCG/ACT spray    FLONASE    1 Bottle    Spray 1-2 sprays into both nostrils daily    Seasonal allergic rhinitis, unspecified chronicity, unspecified trigger       gabapentin 300 MG capsule    NEURONTIN    168 capsule    TAKE 2 CAPSULES (600MG) BY MOUTH THREE TIMES A DAY    Type 2 diabetes mellitus with diabetic neuropathy, with long-term current use of insulin (H)       glucose 4 g Chew chewable tablet     20 tablet    Take 2 every 15 minutes for blood sugar <70mg/dL. Recheck blood sugar every 15 minutes until above 70mg/dL, then eat a substantial meal.    Type 2 diabetes mellitus with mild nonproliferative retinopathy without macular edema, with long-term current use  of insulin, unspecified laterality (H)       guaiFENesin 20 mg/mL Soln solution    ROBITUSSIN    236 mL    Take 10 mLs by mouth every 6 hours as needed for cough    Productive cough       ibuprofen 600 MG tablet    ADVIL/MOTRIN    60 tablet    Take 1 tablet (600 mg) by mouth every 4 hours as needed for moderate pain    Closed fracture of one rib of right side, initial encounter       insulin aspart 100 UNIT/ML injection    NovoLOG FLEXPEN    15 mL    Inject 20 Units Subcutaneous 3 times daily (with meals) Once daily, can add additional 5 units if BGs are >500mg/dL.    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, with long-term current use of insulin, unspecified laterality (H)       ipratropium - albuterol 0.5 mg/2.5 mg/3 mL 0.5-2.5 (3) MG/3ML neb solution    DUONEB    30 vial    Take 1 vial (3 mLs) by nebulization every 6 hours as needed for shortness of breath / dyspnea or wheezing    Wheezing       losartan 100 MG tablet    COZAAR    28 tablet    TAKE 1 TABLET (100MG) BY MOUTH DAILY    Coronary artery disease involving native heart with angina pectoris, unspecified vessel or lesion type (H)       melatonin 5 MG Caps     180 capsule    Take 2 capsules by mouth daily At dinnertime    Insomnia, unspecified type       metoprolol succinate 25 MG 24 hr tablet    TOPROL-XL    30 tablet    Take 1 tablet (25 mg) by mouth daily    Coronary artery disease involving native heart with angina pectoris, unspecified vessel or lesion type (H)       paliperidone 9 MG 24 hr tablet    INVEGA    30 tablet    Take 1 tablet (9 mg) by mouth every morning    Schizoaffective disorder, bipolar type (H)       polyethylene glycol powder     MIRALAX/GLYCOLAX    527 g    TAKE 8.5 GRAMS (1/2 CAPFUL) BY MOUTH DAILY    Constipation, unspecified constipation type       ranitidine 300 MG tablet    ZANTAC    90 tablet    TAKE 1 TABLET (300MG) BY MOUTH AT BEDTIME    Gastroesophageal reflux disease without esophagitis       senna-docusate 8.6-50 MG per tablet    SENOKOT-S;PERICOLACE    100 tablet    Take 1 tablet by mouth 2 times daily as needed for constipation    Drug-induced constipation       sertraline 100 MG tablet    ZOLOFT    60 tablet    Take 2 tablets (200 mg) by mouth daily    Schizoaffective disorder, bipolar type (H)       spacer holding chamber     1 each    Holding/spacer device to use with inhaler.    Pneumonia due to infectious organism, unspecified laterality, unspecified part of lung       SUMAtriptan 25 MG tablet    IMITREX    12 tablet    Take 1-2 tablets (25-50 mg) by mouth at onset of headache for migraine May repeat in 2 hours. Max 8 tablets/24 hours.    Migraine with aura and without status migrainosus, not intractable       topiramate 25 MG tablet    TOPAMAX    120 tablet    Take 2 tablets (50 mg) by mouth 2 times daily    Morbid obesity (H)       vitamin D3 97043 UNITS capsule    CHOLECALCIFEROL    12 capsule    TAKE 1 CAPSULE (50,000 UNITS) MONTHLY    Vitamin D deficiency

## 2018-11-01 NOTE — MR AVS SNAPSHOT
After Visit Summary   11/1/2018    Nena Tang    MRN: [id number]           Patient Information     Date Of Birth          1960        Visit Information        Provider Department      11/1/2018 9:30 AM Eugenia De Jesus, [facility] MTM        Today's Diagnoses     Pneumonia due to infectious organism, unspecified laterality, unspecified part of lung    -  1      Care Instructions    Recommendations from today's MTM visit:                                                        1. Keep working on remembering your Novolog with meals when you go out.    2. Use a spacer with the inhaler. Try using it at bedtime to see if it helps calm down the cough.    Next MTM visit: with Rowena     To schedule another MTM appointment, please call the clinic directly or you may call the MTM scheduling line at [phone] or toll-free at [phone].     My Clinical Pharmacist's contact information:                                                      It was a pleasure talking with you today!  Please feel free to contact me with any questions or concerns you have.      Eugenia De Jesus, PharmD, [facility]   [phone]    You may receive a survey about the MTM services you received.  I would appreciate your feedback to help me serve you better in the future. Please fill it out and return it when you can. Your comments will be anonymous.                  Follow-ups after your visit        Your next 10 appointments already scheduled     Nov 30, 2018  1:30 PM CST   Return Visit with Kraig Pedersen MD   [facility] ([facility])    [address]  [facility] [address] [phone]            Mar 14, 2019  3:15 PM CDT   RETURN RETINA with Tiburcio Chacon MD   [facility] Clinic ([facility])    [place] [address]  [address] [address]  [facility] [address] [phone]               Who to contact     If you have questions or need follow up information about today's clinic visit or your schedule please contact [facility] [place] directly at [phone].  Normal or non-critical lab and imaging results will be communicated to you by MyChart, letter or phone within 4 business days after the clinic has received the results. If you do not hear from us within 7 days, please contact the clinic through [employer] or phone. If you have a critical or abnormal lab result, we will notify you by phone as soon as possible.  Submit refill requests through [employer] or call your pharmacy and they will forward the refill request to us. Please allow 3 business days for your refill to be completed.          Additional Information About Your Visit        [employer] Information     [employer] gives you secure access to your electronic health record. If you see a primary care provider, you can also send messages to your care team and make appointments. If you have questions, please call your primary care clinic.  If you do not have a primary care provider, please call [phone] and they will assist you.        Care EveryWhere ID     This is your Care EveryWhere ID. This could be used by other organizations to access your [facility] medical records  [id number]        Your Vitals Were     Last Period                   01/06/2015            Blood Pressure from Last 3 Encounters:   11/01/18 128/78   10/26/18 130/78   10/21/18 172/72    Weight from Last 3 Encounters:   10/26/18 236 lb (107 kg)   10/17/18 240 lb (108.9 kg)   10/05/18 238 lb (108 kg)              Today, you had the following     No orders found for display         Today's Medication Changes          These changes are accurate as of 11/1/18 10:08 AM.  If you have any questions, ask your nurse or doctor.               Start taking these medicines.        Dose/Directions    spacer holding chamber   Used for:  Pneumonia due to  infectious organism, unspecified laterality, unspecified part of lung   Started by:  Eugenia De Jesus, [facility]        Holding/spacer device to use with inhaler.   Quantity:  1 each   Refills:  0            Where to get your medicines      These medications were sent to [facility] [address] - [place], [place] - [address]  [address], [place] [address]     Phone:  [phone]     spacer holding chamber                Primary Care Provider Office Phone # Fax #    ART Arias [phone]       [address]  [facility] [address]        Goals        General    Medical (pt-stated)     Notes - Note created  7/7/2016  9:15 AM by Chelsea Pulido, RN    Get blood sugars under control    Improve medication compliance    As of today's date 7/7/2016 goal is met at 0 - 25%.   Goal Status:  Active          Equal Access to Services     RAMESH GARRIDO AH: Hadii aad ku hadasho Soomaali, waaxda luqadaha, qaybta kaalmada adeegyada, waxay idiin hayaan yahir kharasae ellison . So [facility] [phone].    ATENCIÓN: Si habla español, tiene a trinh disposición servicios gratuitos de asistencia lingüística. Llame al [phone].    We comply with applicable federal civil rights laws and [place] laws. We do not discriminate on the basis of race, color, national origin, age, disability, sex, sexual orientation, or gender identity.            Thank you!     Thank you for choosing [facility]  for your care. Our goal is always to provide you with excellent care. Hearing back from our patients is one way we can continue to improve our services. Please take a few minutes to complete the written survey that you may receive in the mail after your visit with us. Thank you!             Your Updated Medication List - Protect others around you: Learn how to safely use, store and throw away your medicines at www.disposemymeds.org.          This list is  accurate as of 11/1/18 10:08 AM.  Always use your most recent med list.                   Brand Name Dispense Instructions for use Diagnosis    acetaminophen 500 MG tablet    TYLENOL    100 tablet    Take 2 tablets (1,000 mg) by mouth 3 times daily as needed for mild pain    Chronic low back pain, unspecified back pain laterality, with sciatica presence unspecified       albuterol 108 (90 Base) MCG/ACT inhaler    PROAIR HFA/PROVENTIL HFA/VENTOLIN HFA    1 Inhaler    Inhale 2 puffs into the lungs every 6 hours as needed for shortness of breath / dyspnea or wheezing    Pneumonia, organism unspecified(486)       aspirin 81 MG EC tablet     28 tablet    TAKE 1 TABLET (81MG) BY MOUTH DAILY    Coronary artery disease involving native heart with angina pectoris, unspecified vessel or lesion type (H)       atorvastatin 80 MG tablet    LIPITOR    30 tablet    TAKE 1 TABLET (80MG) BY MOUTH DAILY    Hyperlipidemia LDL goal <100       benztropine 0.5 MG tablet    COGENTIN    60 tablet    Take 1 tablet (0.5 mg) by mouth 2 times daily    Extrapyramidal and movement disorder       calcium carbonate 600 mg (elemental) 1500 (600 Ca) MG tablet    OS-ALLEN    180 tablet    Take 1 tablet (1,500 mg) by mouth daily    Other osteoporosis without current pathological fracture       dulaglutide 1.5 MG/0.5ML pen    TRULICITY    2 mL    Inject 1.5 mg Subcutaneous every 7 days    Type 2 diabetes mellitus with mild nonproliferative retinopathy without macular edema, with long-term current use of insulin, unspecified laterality (H)       fluticasone 50 MCG/ACT spray    FLONASE    1 Bottle    Spray 1-2 sprays into both nostrils daily    Seasonal allergic rhinitis, unspecified chronicity, unspecified trigger       gabapentin 300 MG capsule    NEURONTIN    168 capsule    TAKE 2 CAPSULES (600MG) BY MOUTH THREE TIMES A DAY    Type 2 diabetes mellitus with diabetic neuropathy, with long-term current use of insulin (H)       glucose 4 g Chew chewable  tablet     20 tablet    Take 2 every 15 minutes for blood sugar <70mg/dL. Recheck blood sugar every 15 minutes until above 70mg/dL, then eat a substantial meal.    Type 2 diabetes mellitus with mild nonproliferative retinopathy without macular edema, with long-term current use of insulin, unspecified laterality (H)       guaiFENesin 20 mg/mL Soln solution    ROBITUSSIN    236 mL    Take 10 mLs by mouth every 6 hours as needed for cough    Productive cough       ibuprofen 600 MG tablet    ADVIL/MOTRIN    60 tablet    Take 1 tablet (600 mg) by mouth every 4 hours as needed for moderate pain    Closed fracture of one rib of right side, initial encounter       insulin aspart 100 UNIT/ML injection    NovoLOG FLEXPEN    15 mL    Inject 20 Units Subcutaneous 3 times daily (with meals) Once daily, can add additional 5 units if BGs are >500mg/dL.    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, with long-term current use of insulin, unspecified laterality (H)       ipratropium - albuterol 0.5 mg/2.5 mg/3 mL 0.5-2.5 (3) MG/3ML neb solution    DUONEB    30 vial    Take 1 vial (3 mLs) by nebulization every 6 hours as needed for shortness of breath / dyspnea or wheezing    Wheezing       losartan 100 MG tablet    COZAAR    28 tablet    TAKE 1 TABLET (100MG) BY MOUTH DAILY    Coronary artery disease involving native heart with angina pectoris, unspecified vessel or lesion type (H)       melatonin 5 MG Caps     180 capsule    Take 2 capsules by mouth daily At dinnertime    Insomnia, unspecified type       metoprolol succinate 25 MG 24 hr tablet    TOPROL-XL    30 tablet    Take 1 tablet (25 mg) by mouth daily    Coronary artery  disease involving native heart with angina pectoris, unspecified vessel or lesion type (H)       paliperidone 9 MG 24 hr tablet    INVEGA    30 tablet    Take 1 tablet (9 mg) by mouth every morning    Schizoaffective disorder, bipolar type (H)       polyethylene glycol powder    MIRALAX/GLYCOLAX    527 g    TAKE 8.5 GRAMS (1/2 CAPFUL) BY MOUTH DAILY    Constipation, unspecified constipation type       ranitidine 300 MG tablet    ZANTAC    90 tablet    TAKE 1 TABLET (300MG) BY MOUTH AT BEDTIME    Gastroesophageal reflux disease without esophagitis       senna-docusate 8.6-50 MG per tablet    SENOKOT-S;PERICOLACE    100 tablet    Take 1 tablet by mouth 2 times daily as needed for constipation    Drug-induced constipation       sertraline 100 MG tablet    ZOLOFT    60 tablet    Take 2 tablets (200 mg) by mouth daily    Schizoaffective disorder, bipolar type (H)       spacer holding chamber     1 each    Holding/spacer device to use with inhaler.    Pneumonia due to infectious organism, unspecified laterality, unspecified part of lung       SUMAtriptan 25 MG tablet    IMITREX    12 tablet    Take 1-2 tablets (25-50 mg) by mouth at onset of headache for migraine May repeat in 2 hours. Max 8 tablets/24 hours.    Migraine with aura and without status migrainosus, not intractable       topiramate 25 MG tablet    TOPAMAX    120 tablet    Take 2 tablets (50 mg) by mouth 2 times daily    Morbid obesity (H)       vitamin D3 29251 UNITS capsule    CHOLECALCIFEROL    12 capsule    TAKE 1 CAPSULE (50,000 UNITS) MONTHLY    Vitamin D deficiency

## 2018-11-01 NOTE — PATIENT INSTRUCTIONS
Recommendations from today's MTM visit:                                                        1. Keep working on remembering your Novolog with meals when you go out.    2. Use a spacer with the inhaler. Try using it at bedtime to see if it helps calm down the cough.    Next MTM visit: with Rowena     To schedule another MTM appointment, please call the clinic directly or you may call the MTM scheduling line at [phone] or toll-free at [phone].     My Clinical Pharmacist's contact information:                                                      It was a pleasure talking with you today!  Please feel free to contact me with any questions or concerns you have.      Eugenia De Jesus, PharmD, [facility]   [phone]    You may receive a survey about the MTM services you received.  I would appreciate your feedback to help me serve you better in the future. Please fill it out and return it when you can. Your comments will be anonymous.

## 2018-11-16 DIAGNOSIS — Z79.4 TYPE 2 DIABETES MELLITUS WITH DIABETIC NEUROPATHY, WITH LONG-TERM CURRENT USE OF INSULIN (H): ICD-10-CM

## 2018-11-16 DIAGNOSIS — E11.40 TYPE 2 DIABETES MELLITUS WITH DIABETIC NEUROPATHY, WITH LONG-TERM CURRENT USE OF INSULIN (H): ICD-10-CM

## 2018-11-16 RX ORDER — GABAPENTIN 300 MG/1
CAPSULE ORAL
Qty: 168 CAPSULE | Refills: 1 | Status: SHIPPED | OUTPATIENT
Start: 2018-11-16 | End: 2019-01-25

## 2018-11-16 NOTE — TELEPHONE ENCOUNTER
Requested Prescriptions   Pending Prescriptions Disp Refills     gabapentin (NEURONTIN) 300 MG capsule Last Written Prescription Date:  9/27/18  Last Fill Quantity: 168,  # refills: 1   Last office visit: 10/26/2018 with prescribing provider:     Future Office Visit:   Next 5 appointments (look out 90 days)     Nov 27, 2018 10:30 AM CST   Return Visit with ART Arias CNP   [facility] ([facility])    [address]  [address] [place] [address]   [phone]            Nov 27, 2018 10:30 AM CST   Return Visit with Richardson Lopez [facility] ([facility])    [address]  [address] [place] [address]   [phone]            Nov 27, 2018 10:30 AM CST   SHORT with Eugenia De Jesus [facility] ([facility])    [address] [place] [address]  [address] [place] [address] [phone]            Nov 30, 2018  1:30 PM CST   Return Visit with Kraig Pedersen MD   [facility] ([facility])    [address]  [facility] [address]   [phone]                  168 capsule 1     Sig: TAKE 2 CAPSULES (600MG) BY MOUTH THREE TIMES A DAY    There is no refill protocol information for this order

## 2018-11-16 NOTE — TELEPHONE ENCOUNTER
Refill Request for gabapentin (NEURONTIN) 300 MG capsule   Last refill: 10/22/18-- #168 for 28 day supply    Last clinic visit: 10/26/18   Next appt: 11/27/18      Documentation in problem list reviewed:  Yes  Processing:  electronically send to patient's pharmacy    RX [place] ([place] reviewed:  reviewed- no concerns      Corine Cunha RN  11/16/18  9:14 AM

## 2018-11-20 DIAGNOSIS — F25.0 SCHIZOAFFECTIVE DISORDER, BIPOLAR TYPE (H): ICD-10-CM

## 2018-11-20 DIAGNOSIS — G25.9 EXTRAPYRAMIDAL AND MOVEMENT DISORDER: ICD-10-CM

## 2018-11-20 NOTE — TELEPHONE ENCOUNTER
"Requested Prescriptions   Pending Prescriptions Disp Refills     sertraline (ZOLOFT) 100 MG tablet Last Written Prescription Date:  8/31/18  Last Fill Quantity: 60,  # refills: 2   Last office visit: 8/31/2018 with prescribing provider:     Future Office Visit:   Next 5 appointments (look out 90 days)     Nov 27, 2018 10:30 AM CST   Return Visit with ART Arias CNP   [facility] ([facility])    [address] [place], [place] [address]   [address] [place] [address]   [phone]            Nov 27, 2018 10:30 AM CST   Return Visit with Richardson Lopez, [facility] ([facility])    [address]  [address] [place] [address]   [phone]            Nov 27, 2018 10:30 AM CST   SHORT with Eugenia De Jesus [facility] ([facility])    [address] [place] [address] [address] [place] [address]   [phone]            Nov 30, 2018  1:30 PM CST   Return Visit with Kraig Pedersen MD   [facility] ([facility])    [address]  [facility] [address]   [phone]                  60 tablet 2     Sig: Take 2 tablets (200 mg) by mouth daily    SSRIs Protocol Passed    11/20/2018 10:26 AM       Passed - Recent (12 mo) or future (30 days) visit within the authorizing provider's specialty    Patient had office visit in the last 12 months or has a visit in the next 30 days with authorizing provider or within the authorizing provider's specialty.  See \"Patient Info\" tab in inbasket, or \"Choose Columns\" in Meds & Orders section of the refill encounter.             Passed - Patient is age 18 or older       Passed - No active pregnancy on record       Passed - No positive pregnancy test in last 12 months        benztropine (COGENTIN) 0.5 MG tablet Last Written " Prescription Date:  8/31/18  Last Fill Quantity: 60,  # refills: 2   Last office visit: 8/31/2018 with prescribing provider:     Future Office Visit:   Next 5 appointments (look out 90 days)     Nov 27, 2018 10:30 AM CST   Return Visit with ART Arias CNP   [facility] ([facility])    [address]  [address]  [facility] [address]   [phone]            Nov 27, 2018 10:30 AM CST   Return Visit with Richardson Lopez [facility] ([facility])    [address]  [address]  [facility] [address]   [phone]            Nov 27, 2018 10:30 AM CST   SHORT with Eugenia eD Jesus [facility] ([facility])    [address] [place] [address]  [address]  [facility] [address]   [phone]            Nov 30, 2018  1:30 PM CST   Return Visit with Kraig Pedersen MD   [facility] ([facility])    [address]  [facility] [address]   [phone]                  60 tablet 2     Sig: Take 1 tablet (0.5 mg) by mouth 2 times daily    Antiparkinson's Agents Protocol Passed    11/20/2018 10:26 AM       Passed - Blood pressure under 140/90 in past 12 months    BP Readings from Last 3 Encounters:   11/01/18 128/78   10/26/18 130/78   10/21/18 172/72                Passed - CBC on record in past 12 months    Recent Labs   Lab Test  10/19/18   0743   WBC  10.7   RBC  2.68*   HGB  8.3*   HCT  26.4*   PLT  157                Passed - ALT on record in past 12 months        Recent Labs   Lab Test  10/17/18   1531   ALT  23            Passed - Serum Creatinine on file in past 12 months    Recent Labs   Lab Test  10/19/18   0743   CR  0.89            Passed - Recent (12 mo) or future (30 days) visit within the authorizing provider's specialty    Patient had  "office visit in the last 12 months or has a visit in the next 30 days with authorizing provider or within the authorizing provider's specialty.  See \"Patient Info\" tab in inbasket, or \"Choose Columns\" in Meds & Orders section of the refill encounter.             Passed - Patient is age 18 or older       Passed - No active pregnancy on record       Passed - No positive pregnancy test in the past 12 months        paliperidone (INVEGA) 9 MG 24 hr tablet Last Written Prescription Date:  8/31/18  Last Fill Quantity: 30,  # refills: 2   Last office visit: 8/31/2018 with prescribing provider:     Future Office Visit:   Next 5 appointments (look out 90 days)     Nov 27, 2018 10:30 AM CST   Return Visit with ART Arias CNP   [facility] ([facility])    [address] [place] [address]   [phone]            Nov 27, 2018 10:30 AM CST   Return Visit with Richardson Lopez [facility] ([facility])    [address] [place] [address]   [phone]            Nov 27, 2018 10:30 AM CST   SHORT with Eugenia De Jesus [facility] ([facility])    [address] [place] [address]   [phone]            Nov 30, 2018  1:30 PM CST   Return Visit with Kraig Pedersen MD   [facility] ([facility])    [address] [place] [address]   [phone]                  30 tablet 2     Sig: Take 1 tablet (9 mg) by mouth every morning    There is no refill protocol information for this order          "

## 2018-11-21 RX ORDER — SERTRALINE HYDROCHLORIDE 100 MG/1
200 TABLET, FILM COATED ORAL DAILY
Qty: 60 TABLET | Refills: 2 | Status: SHIPPED | OUTPATIENT
Start: 2018-11-21 | End: 2019-01-25

## 2018-11-21 RX ORDER — PALIPERIDONE 9 MG/1
9 TABLET, EXTENDED RELEASE ORAL EVERY MORNING
Qty: 30 TABLET | Refills: 2 | Status: SHIPPED | OUTPATIENT
Start: 2018-11-21 | End: 2019-01-25

## 2018-11-21 RX ORDER — BENZTROPINE MESYLATE 0.5 MG/1
0.5 TABLET ORAL 2 TIMES DAILY
Qty: 60 TABLET | Refills: 2 | Status: SHIPPED | OUTPATIENT
Start: 2018-11-21 | End: 2018-11-30

## 2018-11-21 NOTE — TELEPHONE ENCOUNTER
Medication Refill Request    Medication(s) requested:  sertraline (ZOLOFT) 100 MG tablet   benztropine (COGENTIN) 0.5 MG tablet   paliperidone (INVEGA) 9 MG 24 hr tablet     Last Office Visit: 10/26/18  Next Office Visit: needs to reschedule with another provider in PCP absence-- does have appt with med on 11/30/18.  Labs: up to date     Last 3 BPs on record as below:  11/1/18-- 128/78  10/26/18-- 130/78  10/21/18-- 172/72  Last PHQ-9 done on 10/26/18-- score of 20    Rx approved and refilled per [facility] refill protocol.    Corine Cunha RN  11/21/18  1:34 PM

## 2018-11-27 DIAGNOSIS — I25.119 CORONARY ARTERY DISEASE INVOLVING NATIVE HEART WITH ANGINA PECTORIS, UNSPECIFIED VESSEL OR LESION TYPE (H): ICD-10-CM

## 2018-11-27 RX ORDER — METOPROLOL SUCCINATE 25 MG/1
25 TABLET, EXTENDED RELEASE ORAL DAILY
Qty: 90 TABLET | Refills: 1 | Status: SHIPPED | OUTPATIENT
Start: 2018-11-27 | End: 2019-05-15

## 2018-11-27 NOTE — TELEPHONE ENCOUNTER
LOV: 10/26/2018    BP Readings from Last 3 Encounters:   11/01/18 128/78   10/26/18 130/78   10/21/18 172/72     Prescription approved per [facility] Refill Protocol.  Thanks! Aniya Baron RN

## 2018-11-27 NOTE — TELEPHONE ENCOUNTER
"Requested Prescriptions   Pending Prescriptions Disp Refills     metoprolol succinate (TOPROL-XL) 25 MG 24 hr tablet Last Written Prescription Date:  10/2/18  Last Fill Quantity: 30,  # refills: 1   Last office visit: 10/26/2018 with prescribing provider:     Future Office Visit:   Next 5 appointments (look out 90 days)     Nov 30, 2018  1:30 PM CST   Return Visit with Kraig Pedersen MD   [facility] ([facility])    [address]  [facility] [address]   [phone]            Dec 05, 2018  2:30 PM CST   Return Visit with Rebecca Carr DO   [facility] ([facility]    [address]  [address]  [facility] [address]   [phone]            Dec 05, 2018  2:30 PM CST   Return Visit with Richardson Lopez [facility] ([facility])    [address]  [address]  [facility] [address]   [phone]                  30 tablet 1     Sig: Take 1 tablet (25 mg) by mouth daily    Beta-Blockers Protocol Passed    11/27/2018  3:37 PM       Passed - Blood pressure under 140/90 in past 12 months    BP Readings from Last 3 Encounters:   11/01/18 128/78   10/26/18 130/78   10/21/18 172/72                Passed - Patient is age 6 or older       Passed - Recent (12 mo) or future (30 days) visit within the authorizing provider's specialty    Patient had office visit in the last 12 months or has a visit in the next 30 days with authorizing provider or within the authorizing provider's specialty.  See \"Patient Info\" tab in inbasket, or \"Choose Columns\" in Meds & Orders section of the refill encounter.                "

## 2018-11-29 NOTE — PROGRESS NOTES
"  [facility] Psychiatry Progress Note                                                                Nena Tang is a 58 year old female who was referred by her primary care provider for treatment and evaluation of depression and psychosis  Therapist: N/A  PCP: Rowena Haas  Other Providers: N/A     History was provided by patient and care taker Zheng Barnes (tel # [phone]) who was a limited historian.    Pertinent Background:  See section specific documentation.  Psych critical item history includes suicidal ideation, psychosis [sxs include hallucinations], mutiple psychotropic trials, psych hosp (>5) and SUBSTANCE USE: cocaine and alcohol     Interim History                                                                                                        4, 4     She stated that her mood was ok.     She still experiences visual and auditory hallucinations in the form of a man in her room. As long as she uses her night light, the man does not appear. This strategy seems to work for her.    She is starting to experience involuntary jaw thrusting.       Recent Symptoms:   Depression:  She reported feeling sad about her sister's terminal illness but clarified that she did not feel depressed or suicidal. She said, \"I actually feel ok.\"  Psychosis:  delusions, auditory hallucinations and visual hallucinations  ADVERSE EFFECTS: jaw thrusting (involuntary)      Recent Substance Use:  none reported        Social/ Family History                                  [per patient report]                                 1ea,1ea   She is currently on SSDI and lives in an Adult [facility]. She is  for the last 3-4 years and was  about 12 years. She has two adult sons. She reported no history of abuse in her life    Medical / Surgical History                                                                                                                  Patient Active " Problem List   Diagnosis     Osteopenia     Vitamin B12 deficiency without anemia     Hyperlipidemia LDL goal <100     Rotator cuff syndrome     Type 2 diabetes mellitus with mild nonproliferative retinopathy (H)     Illiterate     Irritable bowel syndrome     overweight - BMI >35     Takotsubo cardiomyopathy     CAD (coronary artery disease)     Restless legs syndrome (RLS)     CINDY (obstructive sleep apnea)- mild AHI 10.3     Verbal auditory hallucination     Chronic low back pain     Schizoaffective disorder, depressive type (H)     Migraine headache     HTN, goal below 140/90     Health Care Home     Lumbago     Cervicalgia     Cocaine abuse, episodic (H)     Suicidal ideation     Esophageal reflux     Mild nonproliferative diabetic retinopathy (H)     Tardive dyskinesia     Alcohol use     Left cataract     Falls frequently     History of uterine cancer     Psychophysiological insomnia     Dysuria     Asymptomatic postmenopausal status     Abdominal pain, right lower quadrant     Infectious encephalopathy     Non-intractable vomiting with nausea     Thoughts of self harm     Gastroenteritis     Posttraumatic stress disorder     Cervical cancer screening     Type 2 diabetes mellitus with stage 3 chronic kidney disease, with long-term current use of insulin (H)       Past Surgical History:   Procedure Laterality Date     C OOPHORECTORMY FOR JALENIG, W/BX  1983    UTERINE     CATARACT IOL, RT/LT Bilateral 2017     COLONOSCOPY N/A 3/16/2017    Procedure: COLONOSCOPY;  Surgeon: Traci Gonzalez MD;  [facility]: [facility]     Coronary CTA  5/21/2014     HYSTERECTOMY  1983    uterine cancer yearly pap's per provider.     LAPAROSCOPIC CHOLECYSTECTOMY  2008     PHACOEMULSIFICATION CLEAR CORNEA WITH STANDARD INTRAOCULAR LENS IMPLANT Left 5/5/2017    Procedure: PHACOEMULSIFICATION CLEAR CORNEA WITH STANDARD INTRAOCULAR LENS IMPLANT;  LEFT EYE PHACOEMULSIFICATION CLEAR CORNEA WITH STANDARD INTRAOCULAR LENS IMPLANT ;   Surgeon: Tyra Diaz MD;  [facility]     PHACOEMULSIFICATION CLEAR CORNEA WITH STANDARD INTRAOCULAR LENS IMPLANT Right 6/30/2017    Procedure: PHACOEMULSIFICATION CLEAR CORNEA WITH STANDARD INTRAOCULAR LENS IMPLANT;  RIGHT EYE PHACOEMULSIFICATION CLEAR CORNEA WITH STANDARD INTRAOCULAR LENS IMPLANT;  Surgeon: Tyra Diaz MD;  Location:  [facility]     RELEASE TRIGGER FINGER  10/11/2012    Left thumb. Procedure: RELEASE TRIGGER FINGER;  LEFT THUMB TRIGGER RELEASE;  Surgeon: Tay Langley MD;  Location:  [facility]     RELEASE TRIGGER FINGER Right 12/26/2016    Procedure: RELEASE TRIGGER FINGER;  Surgeon: Albino Castañeda MD;  Location:  [facility]        Medical Review of Systems                                                                                                    2,10   A comprehensive review of systems was performed and is negative other than noted in the HPI or interim history.    Allergy                                Imidazole antifungals; Ketoprofen; Lisinopril; Metformin; Metronidazole; and Posaconazole  Current Medications                                                                                                       Current Outpatient Prescriptions   Medication Sig Dispense Refill     acetaminophen (TYLENOL) 500 MG tablet Take 2 tablets (1,000 mg) by mouth 3 times daily as needed for mild pain 100 tablet 3     albuterol (PROAIR HFA/PROVENTIL HFA/VENTOLIN HFA) 108 (90 Base) MCG/ACT Inhaler Inhale 2 puffs into the lungs every 6 hours as needed for shortness of breath / dyspnea or wheezing 1 Inhaler 0     aspirin 81 MG EC tablet TAKE 1 TABLET (81MG) BY MOUTH DAILY 28 tablet 3     atorvastatin (LIPITOR) 80 MG tablet TAKE 1 TABLET (80MG) BY MOUTH DAILY 30 tablet 11     benztropine (COGENTIN) 0.5 MG tablet Take 1 tablet (0.5 mg) by mouth 2 times daily 60 tablet 2     calcium carbonate (OS-ALLEN 600 MG [place]. CA) 1500 (600 CA) MG tablet Take 1 tablet (1,500 mg) by mouth daily 180 tablet 3      dulaglutide (TRULICITY) 1.5 MG/0.5ML pen Inject 1.5 mg Subcutaneous every 7 days 2 mL 3     fluticasone (FLONASE) 50 MCG/ACT spray Spray 1-2 sprays into both nostrils daily 1 Bottle 11     gabapentin (NEURONTIN) 300 MG capsule TAKE 2 CAPSULES (600MG) BY MOUTH THREE TIMES A  capsule 1     glucose 4 G CHEW chewable tablet Take 2 every 15 minutes for blood sugar <70mg/dL. Recheck blood sugar every 15 minutes until above 70mg/dL, then eat a substantial meal. 20 tablet 1     guaiFENesin (ROBITUSSIN) 20 mg/mL SOLN solution Take 10 mLs by mouth every 6 hours as needed for cough 236 mL 0     ibuprofen (ADVIL,MOTRIN) 600 MG tablet Take 1 tablet (600 mg) by mouth every 4 hours as needed for moderate pain 60 tablet 0     insulin aspart (NOVOLOG FLEXPEN) 100 UNIT/ML injection Inject 20 Units Subcutaneous 3 times daily (with meals) Once daily, can add additional 5 units if BGs are >500mg/dL. 15 mL 11     insulin glargine (LANTUS SOLOSTAR) 100 UNIT/ML pen Inject 48 Units Subcutaneous At Bedtime 3 mL 11     insulin pen needle (NOVOFINE 30) 30G X 8 MM USE 4 DAILY OR AS DIRECTED 400 each 0     ipratropium - albuterol 0.5 mg/2.5 mg/3 mL (DUONEB) 0.5-2.5 (3) MG/3ML neb solution Take 1 vial (3 mLs) by nebulization every 6 hours as needed for shortness of breath / dyspnea or wheezing 30 vial 0     losartan (COZAAR) 100 MG tablet TAKE 1 TABLET (100MG) BY MOUTH DAILY 28 tablet 3     melatonin 5 MG CAPS Take 2 capsules by mouth daily At dinnertime 180 capsule 2     metoprolol succinate (TOPROL-XL) 25 MG 24 hr tablet Take 1 tablet (25 mg) by mouth daily 90 tablet 1     paliperidone (INVEGA) 9 MG 24 hr tablet Take 1 tablet (9 mg) by mouth every morning 30 tablet 2     polyethylene glycol (MIRALAX/GLYCOLAX) powder TAKE 8.5 GRAMS (1/2 CAPFUL) BY MOUTH DAILY 527 g 3     ranitidine (ZANTAC) 300 MG tablet TAKE 1 TABLET (300MG) BY MOUTH AT BEDTIME 90 tablet 1     senna-docusate (SENOKOT-S;PERICOLACE) 8.6-50 MG per tablet Take 1 tablet by  mouth 2 times daily as needed for constipation 100 tablet 1     sertraline (ZOLOFT) 100 MG tablet Take 2 tablets (200 mg) by mouth daily 60 tablet 2     spacer (OPTICHAMBER PATRICIA) holding chamber Holding/spacer device to use with inhaler. 1 each 0     SUMAtriptan (IMITREX) 25 MG tablet Take 1-2 tablets (25-50 mg) by mouth at onset of headache for migraine May repeat in 2 hours. Max 8 tablets/24 hours. 12 tablet 1     topiramate (TOPAMAX) 25 MG tablet Take 2 tablets (50 mg) by mouth 2 times daily 120 tablet 3     vitamin D3 (CHOLECALCIFEROL) 51219 UNITS capsule TAKE 1 CAPSULE (50,000 UNITS) MONTHLY 12 capsule 1     Vitals                                                                                                                       3, 3   LMP 01/06/2015   Mental Status Exam                                                                                    9, 14 cog gs     Appearance: casually groomed  Behavior/Demeanor: cooperative, with good  eye contact   Speech: normal  Language: intact  Psychomotor: abnormal movements of the tongue and lower jaw  Mood: depressed  Affect: full range; was congruent to mood; was congruent to content  Thought Process/Associations: unremarkable  Thought Content:  Reports none;  Denies suicidal ideation and violent ideation  Perception:  Reports none;  Denies auditory hallucinations and visual hallucinations  Insight: limited  Judgment: limited  Cognition: (6) does  appear grossly intact; formal cognitive testing was not done  grossly oriented to her circumstances  Gait and Station: unremarkable    Labs and Data                                                                                                                 PHQ9   PHQ-9 SCORE 2/3/2017 3/13/2018 10/26/2018   PHQ-9 Total Score - - -   PHQ-9 Total Score - - -   PHQ-9 Total Score 0 14 20         Diagnosis and Assessment                                                                             m2, h3       Today the  following issues were addressed:    1) Psychosis  2) Grief    [place] Prescription Monitoring Program [] review was not needed today.    PSYCHOTROPIC DRUG INTERACTIONS: none clinically relevant     Diagnosis:  Schizoaffective Disorder  Plan                                                                                                                    m2, h3      Increase Cogentin to 1mg po BID for involuntary jaw thrusting.  Continue other psychiatric medications:  Current Outpatient Prescriptions   Medication Sig     benztropine (COGENTIN) 0.5 MG tablet Take 2 tablets (1 mg) by mouth 2 times daily     acetaminophen (TYLENOL) 500 MG tablet Take 2 tablets (1,000 mg) by mouth 3 times daily as needed for mild pain     albuterol (PROAIR HFA/PROVENTIL HFA/VENTOLIN HFA) 108 (90 Base) MCG/ACT Inhaler Inhale 2 puffs into the lungs every 6 hours as needed for shortness of breath / dyspnea or wheezing     aspirin 81 MG EC tablet TAKE 1 TABLET (81MG) BY MOUTH DAILY     atorvastatin (LIPITOR) 80 MG tablet TAKE 1 TABLET (80MG) BY MOUTH DAILY     calcium carbonate (OS-ALLEN 600 MG [place]. CA) 1500 (600 CA) MG tablet Take 1 tablet (1,500 mg) by mouth daily     dulaglutide (TRULICITY) 1.5 MG/0.5ML pen Inject 1.5 mg Subcutaneous every 7 days     fluticasone (FLONASE) 50 MCG/ACT spray Spray 1-2 sprays into both nostrils daily     gabapentin (NEURONTIN) 300 MG capsule TAKE 2 CAPSULES (600MG) BY MOUTH THREE TIMES A DAY     glucose 4 G CHEW chewable tablet Take 2 every 15 minutes for blood sugar <70mg/dL. Recheck blood sugar every 15 minutes until above 70mg/dL, then eat a substantial meal.     guaiFENesin (ROBITUSSIN) 20 mg/mL SOLN solution Take 10 mLs by mouth every 6 hours as needed for cough     ibuprofen (ADVIL,MOTRIN) 600 MG tablet Take 1 tablet (600 mg) by mouth every 4 hours as needed for moderate pain     insulin aspart (NOVOLOG FLEXPEN) 100 UNIT/ML injection Inject 20 Units Subcutaneous 3 times daily (with meals) Once daily, can  add additional 5 units if BGs are >500mg/dL.     insulin glargine (LANTUS SOLOSTAR) 100 UNIT/ML pen Inject 48 Units Subcutaneous At Bedtime     insulin pen needle (NOVOFINE 30) 30G X 8 MM USE 4 DAILY OR AS DIRECTED     ipratropium - albuterol 0.5 mg/2.5 mg/3 mL (DUONEB) 0.5-2.5 (3) MG/3ML neb solution Take 1 vial (3 mLs) by nebulization every 6 hours as needed for shortness of breath / dyspnea or wheezing     losartan (COZAAR) 100 MG tablet TAKE 1 TABLET (100MG) BY MOUTH DAILY     melatonin 5 MG CAPS Take 2 capsules by mouth daily At dinnertime     metoprolol succinate (TOPROL-XL) 25 MG 24 hr tablet Take 1 tablet (25 mg) by mouth daily     paliperidone (INVEGA) 9 MG 24 hr tablet Take 1 tablet (9 mg) by mouth every morning     polyethylene glycol (MIRALAX/GLYCOLAX) powder TAKE 8.5 GRAMS (1/2 CAPFUL) BY MOUTH DAILY     ranitidine (ZANTAC) 300 MG tablet TAKE 1 TABLET (300MG) BY MOUTH AT BEDTIME     senna-docusate (SENOKOT-S;PERICOLACE) 8.6-50 MG per tablet Take 1 tablet by mouth 2 times daily as needed for constipation     sertraline (ZOLOFT) 100 MG tablet Take 2 tablets (200 mg) by mouth daily     spacer (OPTICHAMBER PATRICIA) holding chamber Holding/spacer device to use with inhaler.     SUMAtriptan (IMITREX) 25 MG tablet Take 1-2 tablets (25-50 mg) by mouth at onset of headache for migraine May repeat in 2 hours. Max 8 tablets/24 hours.     topiramate (TOPAMAX) 25 MG tablet Take 2 tablets (50 mg) by mouth 2 times daily     vitamin D3 (CHOLECALCIFEROL) 82866 UNITS capsule TAKE 1 CAPSULE (50,000 UNITS) MONTHLY     [DISCONTINUED] benztropine (COGENTIN) 0.5 MG tablet Take 1 tablet (0.5 mg) by mouth 2 times daily     No current facility-administered medications for this visit.        RTC: 8 weeks    CRISIS NUMBERS:   Provided routinely in AVS.    Treatment Risk Statement:  The patient understands the risks, benefits, adverse effects and alternatives. Agrees to treatment with the capacity to do so. No medical  contraindications to treatment. Agrees to call clinic for any problems. The patient understands to call 911 or go to the nearest ED if life threatening or urgent symptoms occur.      [facility] Individual Psychotherapy Note                                                                     [16]     Start time - 1:30pm    End time - 1:50pm       Subjective: This supportive psychotherapy session addressed issues related to sadness about her sister's terminal illness.  Patient's reaction: Contemplation in the context of mental status appropriate for ambulatory setting.  Progress: good  Plan: RTC 8 weeks  Psychotherapy services during this visit included myself and the patient.     PROVIDER:  rKaig Pedersen MD

## 2018-11-30 ENCOUNTER — OFFICE VISIT (OUTPATIENT)
Dept: PSYCHIATRY | Facility: CLINIC | Age: 58
End: 2018-11-30
Payer: MEDICARE

## 2018-11-30 DIAGNOSIS — G25.9 EXTRAPYRAMIDAL AND MOVEMENT DISORDER: ICD-10-CM

## 2018-11-30 PROCEDURE — 90833 PSYTX W PT W E/M 30 MIN: CPT | Performed by: PSYCHIATRY & NEUROLOGY

## 2018-11-30 PROCEDURE — 99214 OFFICE O/P EST MOD 30 MIN: CPT | Performed by: PSYCHIATRY & NEUROLOGY

## 2018-11-30 RX ORDER — BENZTROPINE MESYLATE 0.5 MG/1
1 TABLET ORAL 2 TIMES DAILY
Qty: 60 TABLET | Refills: 2 | Status: SHIPPED | OUTPATIENT
Start: 2018-11-30 | End: 2018-12-04

## 2018-11-30 NOTE — PATIENT INSTRUCTIONS
Increase Benztropine to 1mg by mouth twice daily.  Continue other psychiatric medications:  Current Outpatient Prescriptions   Medication Sig     benztropine (COGENTIN) 0.5 MG tablet Take 2 tablets (1 mg) by mouth 2 times daily     acetaminophen (TYLENOL) 500 MG tablet Take 2 tablets (1,000 mg) by mouth 3 times daily as needed for mild pain     albuterol (PROAIR HFA/PROVENTIL HFA/VENTOLIN HFA) 108 (90 Base) MCG/ACT Inhaler Inhale 2 puffs into the lungs every 6 hours as needed for shortness of breath / dyspnea or wheezing     aspirin 81 MG EC tablet TAKE 1 TABLET (81MG) BY MOUTH DAILY     atorvastatin (LIPITOR) 80 MG tablet TAKE 1 TABLET (80MG) BY MOUTH DAILY     calcium carbonate (OS-ALLEN 600 MG [place]. CA) 1500 (600 CA) MG tablet Take 1 tablet (1,500 mg) by mouth daily     dulaglutide (TRULICITY) 1.5 MG/0.5ML pen Inject 1.5 mg Subcutaneous every 7 days     fluticasone (FLONASE) 50 MCG/ACT spray Spray 1-2 sprays into both nostrils daily     gabapentin (NEURONTIN) 300 MG capsule TAKE 2 CAPSULES (600MG) BY MOUTH THREE TIMES A DAY     glucose 4 G CHEW chewable tablet Take 2 every 15 minutes for blood sugar <70mg/dL. Recheck blood sugar every 15 minutes until above 70mg/dL, then eat a substantial meal.     guaiFENesin (ROBITUSSIN) 20 mg/mL SOLN solution Take 10 mLs by mouth every 6 hours as needed for cough     ibuprofen (ADVIL,MOTRIN) 600 MG tablet Take 1 tablet (600 mg) by mouth every 4 hours as needed for moderate pain     insulin aspart (NOVOLOG FLEXPEN) 100 UNIT/ML injection Inject 20 Units Subcutaneous 3 times daily (with meals) Once daily, can add additional 5 units if BGs are >500mg/dL.     insulin glargine (LANTUS SOLOSTAR) 100 UNIT/ML pen Inject 48 Units Subcutaneous At Bedtime     insulin pen needle (NOVOFINE 30) 30G X 8 MM USE 4 DAILY OR AS DIRECTED     ipratropium - albuterol 0.5 mg/2.5 mg/3 mL (DUONEB) 0.5-2.5 (3) MG/3ML neb solution Take 1 vial (3 mLs) by nebulization every 6 hours as needed for shortness of  breath / dyspnea or wheezing     losartan (COZAAR) 100 MG tablet TAKE 1 TABLET (100MG) BY MOUTH DAILY     melatonin 5 MG CAPS Take 2 capsules by mouth daily At dinnertime     metoprolol succinate (TOPROL-XL) 25 MG 24 hr tablet Take 1 tablet (25 mg) by mouth daily     paliperidone (INVEGA) 9 MG 24 hr tablet Take 1 tablet (9 mg) by mouth every morning     polyethylene glycol (MIRALAX/GLYCOLAX) powder TAKE 8.5 GRAMS (1/2 CAPFUL) BY MOUTH DAILY     ranitidine (ZANTAC) 300 MG tablet TAKE 1 TABLET (300MG) BY MOUTH AT BEDTIME     senna-docusate (SENOKOT-S;PERICOLACE) 8.6-50 MG per tablet Take 1 tablet by mouth 2 times daily as needed for constipation     sertraline (ZOLOFT) 100 MG tablet Take 2 tablets (200 mg) by mouth daily     spacer (Bag of Ice PATRICIA) holding chamber Holding/spacer device to use with inhaler.     SUMAtriptan (IMITREX) 25 MG tablet Take 1-2 tablets (25-50 mg) by mouth at onset of headache for migraine May repeat in 2 hours. Max 8 tablets/24 hours.     topiramate (TOPAMAX) 25 MG tablet Take 2 tablets (50 mg) by mouth 2 times daily     vitamin D3 (CHOLECALCIFEROL) 88665 UNITS capsule TAKE 1 CAPSULE (50,000 UNITS) MONTHLY     [DISCONTINUED] benztropine (COGENTIN) 0.5 MG tablet Take 1 tablet (0.5 mg) by mouth 2 times daily     No current facility-administered medications for this visit.      24/7 Crisis Hotlines:    National Suicide Prevention Hotline   053-135-RZDX (3696)    Crisis Hotlines by County:    [place] Crisis Line     [phone]  [place] Crisis Line   [phone]  [facility] Crisis Line     [phone]  [place] COPE for Adults   [phone]  [place] for Children    [phone]  [place] for Adults    [phone]  [place] for Children    [phone]  [facility] Crisis Line    [phone]    [facility] Crisis Response Team  [phone] ([phone])  [facility] Crisis is available 24 hours a day. The team provides callers with a needs assessment and referrals  to appropriate resources. If medically necessary, the Crisis Response Team assists individuals by traveling to their location to provide face-to-face interventions and assessments. Crisis services are available for adults and children in [facility] and [facility]. Adult Crisis beds are also available if appropriate.    Walk-In Centers and Mobile Teams  [facility] Walk-In Crisis Intervention  [phone]  [facility] COPE (18+) Crisis Mobile Team    [phone]  [facility] Child (under 17) Crisis Mobile Team [phone]  [facility] for [facility] Walk-In & Mobile Teams [phone]    Additional Crisis Hotlines:  [facility]      384.532.2067  Crisis Connection      [phone]  [facility] ([facility])  [phone]  [facility] Social Service (S)    [phone]  Men s Crisis Line      742-943-YXRK ([phone])  National Suicide Prevention Hotline   982-820-MHQV (8180)  [facility] Crisis Line    [phone]  Suicide Hotline      419.352.6643  [facility] (formerly [facility])  Dial 2-1-5 (940-415-8706)    Domestic Violence, Rape and Sexual Abuse Hotlines:  [place] Crisis Line     [phone]  [place] Helpline  [phone]  Domestic Abuse Project (DAP)    0-636-539-4407*  [place] Crisis Line     [phone]  [place] [facility]  3-845-670-4609*  [place] [facility]       [phone] ([phone]*)  National Domestic Violence Hotline   8-224-007-YLAS  Rape/Sexual Abuse Center Crisis Line    [phone]   Sexual Violence Center (SVC)    [phone]  [facility] [employer]     [phone] ([phone]*)

## 2018-11-30 NOTE — MR AVS SNAPSHOT
MRN:[id number]                      After Visit Summary   11/30/2018    Nena Tang    MRN: [id number]           Visit Information        Provider Department      11/30/2018 1:30 PM Kraig Pedersen MD [facility]        Your next 10 appointments already scheduled     Dec 05, 2018  2:30 PM CST   Return Visit with Rebecca Carr DO   [facility] ([facility])    [address]  [address]  [facility] [address]   [phone]            Dec 05, 2018  2:30 PM CST   Return Visit with Richardson Lopez, [facility] ([facility])    [address]  [address]  [facility] [address]   [phone]            Mar 14, 2019  3:15 PM CDT   RETURN RETINA with Tiburcio Chacon MD   [facility] ([facility])    [address]  [facility] [address]   [phone]              Care Instructions    Increase Benztropine to 1mg by mouth twice daily.  Continue other psychiatric medications:  Current Outpatient Prescriptions   Medication Sig     benztropine (COGENTIN) 0.5 MG tablet Take 2 tablets (1 mg) by mouth 2 times daily     acetaminophen (TYLENOL) 500 MG tablet Take 2 tablets (1,000 mg) by mouth 3 times daily as needed for mild pain     albuterol (PROAIR HFA/PROVENTIL HFA/VENTOLIN HFA) 108 (90 Base) MCG/ACT Inhaler Inhale 2 puffs into the lungs every 6 hours as needed for shortness of breath / dyspnea or wheezing     aspirin 81 MG EC tablet TAKE 1 TABLET (81MG) BY MOUTH DAILY     atorvastatin (LIPITOR) 80 MG tablet TAKE 1 TABLET (80MG) BY MOUTH DAILY     calcium carbonate (OS-ALLEN 600 MG [place] CA) 1500 (600 CA) MG tablet Take 1 tablet (1,500 mg) by mouth daily     dulaglutide (TRULICITY) 1.5 MG/0.5ML pen Inject 1.5 mg Subcutaneous every 7 days     fluticasone (FLONASE) 50  MCG/ACT spray Spray 1-2 sprays into both nostrils daily     gabapentin (NEURONTIN) 300 MG capsule TAKE 2 CAPSULES (600MG) BY MOUTH THREE TIMES A DAY     glucose 4 G CHEW chewable tablet Take 2 every 15 minutes for blood sugar <70mg/dL. Recheck blood sugar every 15 minutes until above 70mg/dL, then eat a substantial meal.     guaiFENesin (ROBITUSSIN) 20 mg/mL SOLN solution Take 10 mLs by mouth every 6 hours as needed for cough     ibuprofen (ADVIL,MOTRIN) 600 MG tablet Take 1 tablet (600 mg) by mouth every 4 hours as needed for moderate pain     insulin aspart (NOVOLOG FLEXPEN) 100 UNIT/ML injection Inject 20 Units Subcutaneous 3 times daily (with meals) Once daily, can add additional 5 units if BGs are >500mg/dL.     insulin glargine (LANTUS SOLOSTAR) 100 UNIT/ML pen Inject 48 Units Subcutaneous At Bedtime     insulin pen needle (NOVOFINE 30) 30G X 8 MM USE 4 DAILY OR AS DIRECTED     ipratropium - albuterol 0.5 mg/2.5 mg/3 mL (DUONEB) 0.5-2.5 (3) MG/3ML neb solution Take 1 vial (3 mLs) by nebulization every 6 hours as needed for shortness of breath / dyspnea or wheezing     losartan (COZAAR) 100 MG tablet TAKE 1 TABLET (100MG) BY MOUTH DAILY     melatonin 5 MG CAPS Take 2 capsules by mouth daily At dinnertime     metoprolol succinate (TOPROL-XL) 25 MG 24 hr tablet Take 1 tablet (25 mg) by mouth daily     paliperidone (INVEGA) 9 MG 24 hr tablet Take 1 tablet (9 mg) by mouth every morning     polyethylene glycol (MIRALAX/GLYCOLAX) powder TAKE 8.5 GRAMS (1/2 CAPFUL) BY MOUTH DAILY     ranitidine (ZANTAC) 300 MG tablet TAKE 1 TABLET (300MG) BY MOUTH AT BEDTIME     senna-docusate (SENOKOT-S;PERICOLACE) 8.6-50 MG per tablet Take 1 tablet by mouth 2 times daily as needed for constipation     sertraline (ZOLOFT) 100 MG tablet Take 2 tablets (200 mg) by mouth daily     spacer (OPTICHAMBER PATRICIA) holding chamber Holding/spacer device to use with inhaler.     SUMAtriptan (IMITREX) 25 MG tablet Take 1-2 tablets (25-50 mg) by  mouth at onset of headache for migraine May repeat in 2 hours. Max 8 tablets/24 hours.     topiramate (TOPAMAX) 25 MG tablet Take 2 tablets (50 mg) by mouth 2 times daily     vitamin D3 (CHOLECALCIFEROL) 82852 UNITS capsule TAKE 1 CAPSULE (50,000 UNITS) MONTHLY     [DISCONTINUED] benztropine (COGENTIN) 0.5 MG tablet Take 1 tablet (0.5 mg) by mouth 2 times daily     No current facility-administered medications for this visit.      24/7 Crisis Hotlines:    National Suicide Prevention Hotline   794-215-UQND (0135)    Crisis Hotlines by County:    [place] Crisis Line     934.713.1363  [place] Crisis Line   [phone]  [facility] Crisis Line     [phone]  [place] COPE for Adults   [phone]  [place] for Children    [phone]  [facility] for Adults    [phone]  [facility] for Children    [phone]  [facility] Crisis Line    [phone]    [facility] Crisis Response Team  [phone] ([phone])  [facility] Crisis is available 24 hours a day. The team provides callers with a needs assessment and referrals to appropriate resources. If medically necessary, the Crisis Response Team assists individuals by traveling to their location to provide face-to-face interventions and assessments. Crisis services are available for adults and children in [facility] and [facility]. Adult Crisis beds are also available if appropriate.    Walk-In Centers and Mobile Teams  [facility] Service Walk-In Crisis Intervention  [phone]  [facility] COPE (18+) Crisis Mobile Team    [phone]  [facility] Child (under 17) Crisis Mobile Team [phone]  [facility] Urgent[facility] for Adults Walk-In & Mobile Teams [phone]    Additional Crisis Hotlines:  [facility]      938.687.3971  Crisis Connection      [phone]  [facility] ([facility] Crisis Services)  [phone]  [facility] Service (S)    [phone]  Men s Crisis Line       949-916-LXCX ([phone])  National Suicide Prevention Hotline   327-093-IEOM (1929)  [facility] Crisis Line    [phone]  Suicide Hotline      405.873.3946  [facility] (formerly [facility])  Dial 2-1-12 (139-385-3876)    Domestic Violence, Rape and Sexual Abuse Hotlines:  [place] Crisis Line     [phone]  [place][1] Helpline  [phone]  Domestic Abuse Project (DAP)    1-685.246.6219*  [place] Crisis Line     [phone]  [facility]  1-166-161-1376*  [facility]       [phone] ([phone]*)  National Domestic Violence Hotline   4-004-828-UVJZ  [facility]ape[facility] Abuse [facility] Crisis Line    [phone]   Sexual Violence Center (SVC)    [phone]  [facility] [employer]     [phone] ([phone]*)           MyChart Information     [facility] gives you secure access to your electronic health record. If you see a primary care provider, you can also send messages to your care team and make appointments. If you have questions, please call your primary care clinic.  If you do not have a primary care provider, please call [phone] and they will assist you.        Care EveryWhere ID     This is your Care EveryWhere ID. This could be used by other organizations to access your [facility] medical records  [id number]        Equal Access to Services     RAMESH GARRIDO : Hadreba lancaster Sodelphine, waaxda luqadaha, qaybta kaalmalorena anderson. So [facility] [phone].    ATENCIÓN: Si habla español, tiene a trinh disposición servicios gratuitos de asistencia lingüística. Llame al [phone].    We comply with applicable federal civil rights laws and [place] laws. We do not discriminate on the basis of race, color, national origin, age, disability, sex, sexual orientation, or gender identity.

## 2018-12-17 DIAGNOSIS — K59.00 CONSTIPATION, UNSPECIFIED CONSTIPATION TYPE: ICD-10-CM

## 2018-12-17 RX ORDER — POLYETHYLENE GLYCOL 3350 17 G/17G
POWDER, FOR SOLUTION ORAL
Qty: 527 G | Refills: 1 | Status: SHIPPED | OUTPATIENT
Start: 2018-12-17 | End: 2019-04-08

## 2018-12-17 NOTE — TELEPHONE ENCOUNTER
"polyethylene glycol (MIRALAX/GLYCOLAX) powder  Requested Prescriptions  Last Written Prescription Date:  4/17/18  Last Fill Quantity: 527g,  # refills: 3   Last office visit: 10/26/2018 with prescribing provider:     Future Office Visit:   Next 5 appointments (look out 90 days)    Dec 20, 2018  2:30 PM CST  Return Visit with Rebecca Carr DO  [facility] ([facility]) [address]  [address]  [facility] [address] [phone]   Jan 25, 2019  1:30 PM CST  Return Visit with Kraig Pedersen MD  [facility] ([facility]) [address]  [facility] [id number] [phone]            Pending Prescriptions Disp Refills     polyethylene glycol (MIRALAX/GLYCOLAX) powder 527 g 3     Sig: TAKE 8.5 GRAMS (1/2 CAPFUL) BY MOUTH DAILY    [employer] Protocol Passed - 12/17/2018 12:32 PM       Passed - Patient is age 6 or older       Passed - Recent (12 mo) or future (30 days) visit within the authorizing provider's specialty    Patient had office visit in the last 12 months or has a visit in the next 30 days with authorizing provider or within the authorizing provider's specialty.  See \"Patient Info\" tab in inbasket, or \"Choose Columns\" in Meds & Orders section of the refill encounter.                "

## 2018-12-17 NOTE — TELEPHONE ENCOUNTER
Prescription approved per [facility] Refill Protocol.    Signed Prescriptions:                        Disp   Refills    polyethylene glycol (MIRALAX/GLYCOLAX) pow*527 g  1        Sig: TAKE 8.5 GRAMS (1/2 CAPFUL) BY MOUTH DAILY  Authorizing Provider: RUPAL TORRES  Ordering User: PATI GARCIA      Closing encounter - no further actions needed at this time    Pati Garcia RN

## 2018-12-20 ENCOUNTER — OFFICE VISIT (OUTPATIENT)
Dept: FAMILY MEDICINE | Facility: CLINIC | Age: 58
End: 2018-12-20
Payer: MEDICARE

## 2018-12-20 VITALS
BODY MASS INDEX: 46.63 KG/M2 | TEMPERATURE: 97.5 F | WEIGHT: 237.5 LBS | SYSTOLIC BLOOD PRESSURE: 124 MMHG | HEART RATE: 86 BPM | DIASTOLIC BLOOD PRESSURE: 70 MMHG | HEIGHT: 60 IN | OXYGEN SATURATION: 97 % | RESPIRATION RATE: 16 BRPM

## 2018-12-20 DIAGNOSIS — Z79.4 TYPE 2 DIABETES MELLITUS WITH STAGE 3 CHRONIC KIDNEY DISEASE, WITH LONG-TERM CURRENT USE OF INSULIN (H): Primary | ICD-10-CM

## 2018-12-20 DIAGNOSIS — M25.511 ACUTE PAIN OF BOTH SHOULDERS: ICD-10-CM

## 2018-12-20 DIAGNOSIS — M25.512 ACUTE PAIN OF BOTH SHOULDERS: ICD-10-CM

## 2018-12-20 DIAGNOSIS — R76.8 POSITIVE ANA (ANTINUCLEAR ANTIBODY): ICD-10-CM

## 2018-12-20 DIAGNOSIS — I25.119 CORONARY ARTERY DISEASE INVOLVING NATIVE HEART WITH ANGINA PECTORIS, UNSPECIFIED VESSEL OR LESION TYPE (H): ICD-10-CM

## 2018-12-20 DIAGNOSIS — R06.09 DYSPNEA ON EXERTION: ICD-10-CM

## 2018-12-20 DIAGNOSIS — E11.22 TYPE 2 DIABETES MELLITUS WITH STAGE 3 CHRONIC KIDNEY DISEASE, WITH LONG-TERM CURRENT USE OF INSULIN (H): Primary | ICD-10-CM

## 2018-12-20 DIAGNOSIS — N18.30 TYPE 2 DIABETES MELLITUS WITH STAGE 3 CHRONIC KIDNEY DISEASE, WITH LONG-TERM CURRENT USE OF INSULIN (H): Primary | ICD-10-CM

## 2018-12-20 DIAGNOSIS — I51.81 TAKOTSUBO CARDIOMYOPATHY: ICD-10-CM

## 2018-12-20 LAB
BASOPHILS # BLD AUTO: 0 10E9/L (ref 0–0.2)
BASOPHILS NFR BLD AUTO: 0.3 %
CRP SERPL-MCNC: 5.9 MG/L (ref 0–8)
DIFFERENTIAL METHOD BLD: ABNORMAL
EOSINOPHIL # BLD AUTO: 0.1 10E9/L (ref 0–0.7)
EOSINOPHIL NFR BLD AUTO: 1.3 %
ERYTHROCYTE [DISTWIDTH] IN BLOOD BY AUTOMATED COUNT: 13.7 % (ref 10–15)
ERYTHROCYTE [SEDIMENTATION RATE] IN BLOOD BY WESTERGREN METHOD: 47 MM/H (ref 0–30)
HBA1C MFR BLD: 6.4 % (ref 0–5.6)
HCT VFR BLD AUTO: 33.5 % (ref 35–47)
HGB BLD-MCNC: 10.9 G/DL (ref 11.7–15.7)
LYMPHOCYTES # BLD AUTO: 2.4 10E9/L (ref 0.8–5.3)
LYMPHOCYTES NFR BLD AUTO: 37 %
MCH RBC QN AUTO: 31 PG (ref 26.5–33)
MCHC RBC AUTO-ENTMCNC: 32.5 G/DL (ref 31.5–36.5)
MCV RBC AUTO: 95 FL (ref 78–100)
MONOCYTES # BLD AUTO: 0.6 10E9/L (ref 0–1.3)
MONOCYTES NFR BLD AUTO: 8.9 %
NEUTROPHILS # BLD AUTO: 3.4 10E9/L (ref 1.6–8.3)
NEUTROPHILS NFR BLD AUTO: 52.5 %
PLATELET # BLD AUTO: 214 10E9/L (ref 150–450)
RBC # BLD AUTO: 3.52 10E12/L (ref 3.8–5.2)
WBC # BLD AUTO: 6.4 10E9/L (ref 4–11)

## 2018-12-20 PROCEDURE — 86038 ANTINUCLEAR ANTIBODIES: CPT | Performed by: FAMILY MEDICINE

## 2018-12-20 PROCEDURE — 80048 BASIC METABOLIC PNL TOTAL CA: CPT | Performed by: FAMILY MEDICINE

## 2018-12-20 PROCEDURE — 86039 ANTINUCLEAR ANTIBODIES (ANA): CPT | Performed by: FAMILY MEDICINE

## 2018-12-20 PROCEDURE — 36415 COLL VENOUS BLD VENIPUNCTURE: CPT | Performed by: FAMILY MEDICINE

## 2018-12-20 PROCEDURE — 93000 ELECTROCARDIOGRAM COMPLETE: CPT | Performed by: FAMILY MEDICINE

## 2018-12-20 PROCEDURE — 85652 RBC SED RATE AUTOMATED: CPT | Performed by: FAMILY MEDICINE

## 2018-12-20 PROCEDURE — 86431 RHEUMATOID FACTOR QUANT: CPT | Performed by: FAMILY MEDICINE

## 2018-12-20 PROCEDURE — 86200 CCP ANTIBODY: CPT | Performed by: FAMILY MEDICINE

## 2018-12-20 PROCEDURE — 82550 ASSAY OF CK (CPK): CPT | Performed by: FAMILY MEDICINE

## 2018-12-20 PROCEDURE — 86140 C-REACTIVE PROTEIN: CPT | Performed by: FAMILY MEDICINE

## 2018-12-20 PROCEDURE — 84443 ASSAY THYROID STIM HORMONE: CPT | Performed by: FAMILY MEDICINE

## 2018-12-20 PROCEDURE — 99214 OFFICE O/P EST MOD 30 MIN: CPT | Performed by: FAMILY MEDICINE

## 2018-12-20 PROCEDURE — 85025 COMPLETE CBC W/AUTO DIFF WBC: CPT | Performed by: FAMILY MEDICINE

## 2018-12-20 PROCEDURE — 83036 HEMOGLOBIN GLYCOSYLATED A1C: CPT | Performed by: FAMILY MEDICINE

## 2018-12-20 ASSESSMENT — PAIN SCALES - GENERAL: PAINLEVEL: EXTREME PAIN (8)

## 2018-12-20 ASSESSMENT — MIFFLIN-ST. JEOR: SCORE: 1583.79

## 2018-12-20 NOTE — PATIENT INSTRUCTIONS
Thank you for coming in today!  I'm sorry to hear about your sister.      Here is a brief summary of the plan we discussed:  1.  Diabetes:  Continue monitoring blood sugars.  No medication changes today.  2.  Shoulder pain:  We will check some labs.  If everything is normal we can try some physical therapy.  3.  Shortness of breath:  Your EKG looked OK.  Have chest x-ray done today.  Please schedule an appointment with your cardiologist.  If your breathing worsens or you have chest pain please go to ER.      Follow-up with me and Eugenia (pharmacist) in 4 weeks.      I look forward to seeing you back in clinic!

## 2018-12-20 NOTE — PROGRESS NOTES
SUBJECTIVE:                                                    Nena Tang is a 58 year old female who presents to clinic today for the following health issues:  [facility]: none available    Bilateral shoulder pain x 4 weeks, not getting worse but not getting better.  Taking ibuprofen at night which helps for a while.  Pain is constant, worse with any movement.  Has not tried ice or heat.  No weakness in upper extremities.  No numbness.  Hands occasionally feel swollen.  No fevers.  No neck pain.  No skin rashes.  Also complains of increased difficulty getting up from chair, no weakness in hips/thighs noted.      Diabetes Follow-up      Patient is checking blood sugars:2 -3 times per day   Fasting    Before lunch    Before bed 104-285   Misses doses if she is out which is nearly 50% of her Novolog doses    Diabetic concerns: None     Symptoms of hypoglycemia (low blood sugar): none     Paresthesias (numbness or burning in feet) or sores: No     Date of last diabetic eye exam: UTD     Diabetes Management Resources    Hyperlipidemia Follow-Up      Rate your low fat/cholesterol diet?: fair    Taking statin?  Yes, possible muscle aches from statin pt states right and left shoulder pain.     Other lipid medications/supplements?:  none    Hypertension Follow-up      Outpatient blood pressures are not being checked.    Low Salt Diet: no added salt    BP Readings from Last 2 Encounters:   12/20/18 124/70   11/01/18 128/78     Hemoglobin A1C (%)   Date Value   12/20/2018 6.4 (H)   08/06/2018 6.4 (H)     LDL Cholesterol Calculated (mg/dL)   Date Value   08/06/2018 84   06/27/2017 64           CAD Follow-up    Symptoms:    Shortness of breath: Yes for a few weeks when walking after 1 block    Lower extremity edema: both left and right feet (chronic)    Chest pain: No    Using more pillows than normal: Yes-  Three pillows utilized at night to help with breathing (has not been using CPAP)    Cough at night: Yes      Weight:    Checking weight daily: No    Weight change: Yes 236 last office visit today pt weights 237.5 lb    Has not been using CPAP (feels suffocated)      Cardiology visits, ER/UC, or hospital admissions since last visit: None    Medication side effects: dizziness      Mental Health Follow up     Status since last visit: sad, depressed - holidays are stressful, denies SI, feels safe at group home, seeing  Therapist at [facility] in [place] 1-2 times per week    Complicating factors: sister in hospital - lung cancer, dying  Significant life event:  No   Current substance abuse:  None  Anxiety or Panic symptoms:  No    Sleep - not sleeping well bc not using CPAP, getting 5-6 hours of sleep  Appetite - good  Exercise - at [facility] in [place] she does some some gentle exercises    Smoking - none  Alcohol - none  Street drugs - none  Marijuana - none  Caffeine - drinking 2 cups of coffee per day    PHQ-9  English PHQ-9   Any Language             Social History     Tobacco Use     Smoking status: Never Smoker     Smokeless tobacco: Never Used   Substance Use Topics     Alcohol use: No     Comment: last month        Problem list and histories reviewed & adjusted, as indicated.  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 (H)     Illiterate     Irritable bowel syndrome     overweight - BMI >35     Takotsubo cardiomyopathy     CAD (coronary artery disease)     Restless legs syndrome (RLS)     CINDY (obstructive sleep apnea)- mild AHI 10.3     Verbal auditory hallucination     Chronic low back pain     Schizoaffective disorder, depressive type (H)     Migraine headache     HTN, goal below 140/90     Health Care Home     Lumbago     Cervicalgia     Cocaine abuse, episodic (H)     Suicidal ideation     Esophageal reflux      Mild nonproliferative diabetic retinopathy (H)     Tardive dyskinesia     Alcohol use     Left cataract     Falls frequently     History of uterine cancer     Psychophysiological insomnia     Dysuria     Asymptomatic postmenopausal status     Abdominal pain, right lower quadrant     Infectious encephalopathy     Non-intractable vomiting with nausea     Thoughts of self harm     Gastroenteritis     Posttraumatic stress disorder     Cervical cancer screening     Type 2 diabetes mellitus with stage 3 chronic kidney disease, with long-term current use of insulin (H)     Past Surgical History:   Procedure Laterality Date     C OOPHORECTORMY FOR MALIG, W/BX  1983    UTERINE     CATARACT IOL, RT/LT Bilateral 2017     COLONOSCOPY N/A 3/16/2017    Procedure: COLONOSCOPY;  Surgeon: Traci Gonzalez MD;  Location:  GI     Coronary CTA  5/21/2014     HYSTERECTOMY  1983    uterine cancer yearly pap's per provider.     LAPAROSCOPIC CHOLECYSTECTOMY  2008     PHACOEMULSIFICATION CLEAR CORNEA WITH STANDARD INTRAOCULAR LENS IMPLANT Left 5/5/2017    Procedure: PHACOEMULSIFICATION CLEAR CORNEA WITH STANDARD INTRAOCULAR LENS IMPLANT;  LEFT EYE PHACOEMULSIFICATION CLEAR CORNEA WITH STANDARD INTRAOCULAR LENS IMPLANT ;  Surgeon: Tyra Diaz MD;  Location: [facility]     PHACOEMULSIFICATION CLEAR CORNEA WITH STANDARD INTRAOCULAR LENS IMPLANT Right 6/30/2017    Procedure: PHACOEMULSIFICATION CLEAR CORNEA WITH STANDARD INTRAOCULAR LENS IMPLANT;  RIGHT EYE PHACOEMULSIFICATION CLEAR CORNEA WITH STANDARD INTRAOCULAR LENS IMPLANT;  Surgeon: Tyra Diaz MD;  Location:  [facility]     RELEASE TRIGGER FINGER  10/11/2012    Left thumb. Procedure: RELEASE TRIGGER FINGER;  LEFT THUMB TRIGGER RELEASE;  Surgeon: Tay Langley MD;  Location:  [facility]     RELEASE TRIGGER FINGER Right 12/26/2016    Procedure: RELEASE TRIGGER FINGER;  Surgeon: Albino Castañeda MD;  Location: [facility] OR       Social History     Tobacco Use     Smoking  status: Never Smoker     Smokeless tobacco: Never Used   Substance Use Topics     Alcohol use: No     Comment: last month     Family History   Problem Relation Age of Onset     Cancer Mother         BLADDER     Respiratory Mother         COPD     Gastrointestinal Disease Mother         CIRRHOSIS OF LI BOLIVAR     Alcohol/Drug Mother      Diabetes Mother      Hypertension Mother      Lipids Mother      C.A.D. Mother      Glaucoma Mother      Alcohol/Drug Sister      Mental Illness Sister      Alcohol/Drug Sister      Psychotic Disorder Sister      Cancer Maternal Grandmother         UNKNOWN TYPE     Cancer Brother         COLON     Cancer - colorectal Brother         IN HIS LATE 30S     Alcohol/Drug Brother          OF HEROIN OVERDOSE AT AGE 22 YRS     Macular Degeneration No family hx of            Current Outpatient Medications   Medication Sig Dispense Refill     acetaminophen (TYLENOL) 500 MG tablet Take 2 tablets (1,000 mg) by mouth 3 times daily as needed for mild pain 100 tablet 3     albuterol (PROAIR HFA/PROVENTIL HFA/VENTOLIN HFA) 108 (90 Base) MCG/ACT Inhaler Inhale 2 puffs into the lungs every 6 hours as needed for shortness of breath / dyspnea or wheezing 1 Inhaler 0     aspirin 81 MG EC tablet TAKE 1 TABLET (81MG) BY MOUTH DAILY 28 tablet 3     atorvastatin (LIPITOR) 80 MG tablet TAKE 1 TABLET (80MG) BY MOUTH DAILY 30 tablet 11     benztropine (COGENTIN) 0.5 MG tablet Take 2 tablets (1 mg) by mouth 2 times daily 120 tablet 2     calcium carbonate (OS-ALLEN 600 MG [place]. CA) 1500 (600 CA) MG tablet Take 1 tablet (1,500 mg) by mouth daily 180 tablet 3     dulaglutide (TRULICITY) 1.5 MG/0.5ML pen Inject 1.5 mg Subcutaneous every 7 days 2 mL 3     fluticasone (FLONASE) 50 MCG/ACT spray Spray 1-2 sprays into both nostrils daily 1 Bottle 11     gabapentin (NEURONTIN) 300 MG capsule TAKE 2 CAPSULES (600MG) BY MOUTH THREE TIMES A  capsule 1     glucose 4 G CHEW chewable tablet Take 2 every 15 minutes for  blood sugar <70mg/dL. Recheck blood sugar every 15 minutes until above 70mg/dL, then eat a substantial meal. 20 tablet 1     guaiFENesin (ROBITUSSIN) 20 mg/mL SOLN solution Take 10 mLs by mouth every 6 hours as needed for cough 236 mL 0     ibuprofen (ADVIL,MOTRIN) 600 MG tablet Take 1 tablet (600 mg) by mouth every 4 hours as needed for moderate pain 60 tablet 0     insulin aspart (NOVOLOG FLEXPEN) 100 UNIT/ML injection Inject 20 Units Subcutaneous 3 times daily (with meals) Once daily, can add additional 5 units if BGs are >500mg/dL. 15 mL 11     insulin glargine (LANTUS SOLOSTAR) 100 UNIT/ML pen Inject 48 Units Subcutaneous At Bedtime 3 mL 11     insulin pen needle (NOVOFINE 30) 30G X 8 MM USE 4 DAILY OR AS DIRECTED 400 each 0     ipratropium - albuterol 0.5 mg/2.5 mg/3 mL (DUONEB) 0.5-2.5 (3) MG/3ML neb solution Take 1 vial (3 mLs) by nebulization every 6 hours as needed for shortness of breath / dyspnea or wheezing 30 vial 0     losartan (COZAAR) 100 MG tablet TAKE 1 TABLET (100MG) BY MOUTH DAILY 28 tablet 3     melatonin 5 MG CAPS Take 2 capsules by mouth daily At dinnertime 180 capsule 2     metoprolol succinate (TOPROL-XL) 25 MG 24 hr tablet Take 1 tablet (25 mg) by mouth daily 90 tablet 1     paliperidone (INVEGA) 9 MG 24 hr tablet Take 1 tablet (9 mg) by mouth every morning 30 tablet 2     polyethylene glycol (MIRALAX/GLYCOLAX) powder TAKE 8.5 GRAMS (1/2 CAPFUL) BY MOUTH DAILY 527 g 1     ranitidine (ZANTAC) 300 MG tablet TAKE 1 TABLET (300MG) BY MOUTH AT BEDTIME 90 tablet 1     senna-docusate (SENOKOT-S;PERICOLACE) 8.6-50 MG per tablet Take 1 tablet by mouth 2 times daily as needed for constipation 100 tablet 1     sertraline (ZOLOFT) 100 MG tablet Take 2 tablets (200 mg) by mouth daily 60 tablet 2     spacer (OPTICHAMBER PATRICIA) holding chamber Holding/spacer device to use with inhaler. 1 each 0     SUMAtriptan (IMITREX) 25 MG tablet Take 1-2 tablets (25-50 mg) by mouth at onset of headache for migraine  May repeat in 2 hours. Max 8 tablets/24 hours. 12 tablet 1     topiramate (TOPAMAX) 25 MG tablet Take 2 tablets (50 mg) by mouth 2 times daily 120 tablet 3     vitamin D3 (CHOLECALCIFEROL) 56693 UNITS capsule TAKE 1 CAPSULE (50,000 UNITS) MONTHLY 12 capsule 1     Allergies   Allergen Reactions     Imidazole Antifungals Hives     Tolerates diflucan     Ketoprofen Itching     Pruritis to topical     Lisinopril Hives     Metformin Other (See Comments)     Patient hospitalized for lactic acidosis - admitting provider suspectd caused by metformin     Metronidazole Hives     Posaconazole Hives     Tolerates diflucan     Recent Labs   Lab Test 12/20/18  1532 10/19/18  0743  10/17/18  1531 08/06/18  1038  05/28/18  1625 05/22/18  1434  02/08/18  2155  11/07/17  0801  06/27/17  1358  07/13/16  1350  07/14/15  1215   A1C 6.4*  --   --   --  6.4*  --   --  6.2*   < >  --    < > 8.9*   < > 7.0*   < >  --    < > 9.1*   LDL  --   --   --   --  84  --   --   --   --   --   --   --   --  64  --  137*  --  78   HDL  --   --   --   --  46*  --   --   --   --   --   --   --   --  37*  --   --   --  39*   TRIG  --   --   --   --  215*  --   --   --   --   --   --   --   --  267*  --   --   --  151*   ALT  --   --   --  23  --   --  27  --   --  23   < > 23   < > 32   < >  --    < >  --    CR 0.99 0.89   < > 1.04  --    < > 1.16*  --   --  1.09*   < > 0.88   < > 0.78   < >  --    < > 0.67   GFRESTIMATED 63 66   < > 54*  --    < > 48*  --   --  52*   < > 66   < > 76   < >  --    < > >90  Non  GFR Calc     GFRESTBLACK 73 79   < > 66  --    < > 58*  --   --  63   < > 80   < > >90   GFR Calc     < >  --    < > >90   GFR Calc     POTASSIUM 4.2 3.9   < > 4.3  --    < > 4.5  --   --  4.3   < > 3.9   < > 4.3   < >  --    < > 4.3   TSH 1.98  --   --   --   --   --   --   --   --   --   --  3.21  --   --    < >  --    < >  --     < > = values in this interval not displayed.      BP Readings from  Last 3 Encounters:   12/20/18 124/70   11/01/18 128/78   10/26/18 130/78    Wt Readings from Last 3 Encounters:   12/20/18 107.7 kg (237 lb 8 oz)   10/26/18 107 kg (236 lb)   10/17/18 108.9 kg (240 lb)        Labs reviewed in EPIC  Problem list, Medication list, Allergies, and Medical/Social/Surgical histories reviewed in [facility] and updated as appropriate.     ROS: Constitutional, neuro, ENT, endocrine, pulmonary, cardiac, gastrointestinal, genitourinary, musculoskeletal, integument and psychiatric systems are negative, except as otherwise noted above in the HPI.       OBJECTIVE:                                                    /70   Pulse 86   Temp 97.5  F (36.4  C) (Oral)   Resp 16   Ht 1.524 m (5')   Wt 107.7 kg (237 lb 8 oz)   LMP 01/06/2015   SpO2 97%   BMI 46.38 kg/m    Body mass index is 46.38 kg/m .  GENERAL: healthy, alert, well nourished, well hydrated, no distress  EYES: Eyes grossly normal to inspection, extraocular movements - intact, and PERRL, normal conjunctiva, no scleral icterus  HENT: ear canals- normal; TMs- normal; Nose- normal; Mouth- no ulcers, no lesions  NECK: no tenderness, no adenopathy, no asymmetry, no masses, no stiffness; thyroid- normal to palpation  RESP: lungs clear to auscultation - no rales, no rhonchi, no wheezes  CV: regular rates and rhythm,no murmur  ABDOMEN: soft, no tenderness, no  hepatosplenomegaly, no masses, normal bowel sounds  MS: extremities- no gross deformities noted, trace edema; tenderness of bilateral shoulders anteriorly and laterally with ROM decreased in all planes  SKIN: no suspicious lesions, no rashes  NEURO: alert and oriented, no focal deficits, normal strength in bilateral lower extremities and distal upper extremities bilaterally but 4/5 strength in proximal upper extremities bilaterally (due to pain?), normal sensation, no tremor    Mental Status Assessment:  Appearance:   Appropriate   Eye Contact:   Good   Psychomotor Behavior: Normal    Attitude:   Cooperative   Orientation:   All  Speech   Rate / Production: Normal    Volume:  Normal   Mood:    Normal  Affect:    Flat   Thought Content:  Clear   Thought Form:  Coherent  Logical   Insight:    Fair   Attention Span and Concentration:  adequate  Recent and Remote Memory:  intact  Fund of Knowledge: appropriate  Diagnostic Test Results:  none      ASSESSMENT/PLAN:                                                      1. Type 2 diabetes mellitus with stage 3 chronic kidney disease, with long-term current use of insulin (H)  Poor compliance, uncontrolled.  Check labs today.  Will have follow-up with MTM.  Hard to make adjustments given not taking Novolog regularly.  - Hemoglobin A1c  - Basic metabolic panel  (Ca, Cl, CO2, Creat, Gluc, K, Na, BUN)    2. Dyspnea on exertion  EKG unchanged from baseline.  Check labs and obtain chest XR.  Appears euvolemic on exam today.    - CBC with platelets and differential  - TSH with free T4 reflex  - XR Chest 2 Views; Future  - EKG 12-lead complete w/read - Clinics  - CARDIOLOGY EVAL ADULT REFERRAL    3. Takotsubo cardiomyopathy  No evidence of CHF exacerbation.  However, will have her follow-up with cardiology.    - XR Chest 2 Views; Future  - CARDIOLOGY EVAL ADULT REFERRAL    4. Acute pain of both shoulders  Concern for PMR given bilateral shoulder pain/weakness as well as subjective weakness in hips.  This would be unfortunate as chronic steroids would increase blood glucose further.  Also consider other connective tissue disease or statin myopathy.  Labs as ordered.  Consider imaging and/or PT if work-up is negative.  - ESR: Erythrocyte sedimentation rate  - CRP, inflammation  - Anti Nuclear Mary IgG by IFA with Reflex  - Cyclic Citrullinated Peptide Antibody IgG  - Rheumatoid factor  - CK total    5. Coronary artery disease involving native heart with angina pectoris, unspecified vessel or lesion type (H)  Given history of CAD and reported increased MEYERS will  have her follow-up with cardiology.  EKG looked OK to me today - no acute changes.    - CARDIOLOGY EVAL ADULT REFERRAL    Will have her follow-up with me, C, and MTM in 4 weeks, sooner if needed.  She expressed agreement and understanding with plan as above.      Rebecca Carr, DO  [facility]

## 2018-12-21 ENCOUNTER — TELEPHONE (OUTPATIENT)
Dept: FAMILY MEDICINE | Facility: CLINIC | Age: 58
End: 2018-12-21

## 2018-12-21 LAB
ANA PAT SER IF-IMP: ABNORMAL
ANA SER QL IF: POSITIVE
ANA TITR SER IF: ABNORMAL {TITER}
ANION GAP SERPL CALCULATED.3IONS-SCNC: 7 MMOL/L (ref 3–14)
BUN SERPL-MCNC: 20 MG/DL (ref 7–30)
CALCIUM SERPL-MCNC: 10 MG/DL (ref 8.5–10.1)
CHLORIDE SERPL-SCNC: 104 MMOL/L (ref 94–109)
CK SERPL-CCNC: 103 U/L (ref 30–225)
CO2 SERPL-SCNC: 25 MMOL/L (ref 20–32)
CREAT SERPL-MCNC: 0.99 MG/DL (ref 0.52–1.04)
GFR SERPL CREATININE-BSD FRML MDRD: 63 ML/MIN/{1.73_M2}
GLUCOSE SERPL-MCNC: 135 MG/DL (ref 70–99)
POTASSIUM SERPL-SCNC: 4.2 MMOL/L (ref 3.4–5.3)
RHEUMATOID FACT SER NEPH-ACNC: <20 IU/ML (ref 0–20)
SODIUM SERPL-SCNC: 136 MMOL/L (ref 133–144)
TSH SERPL DL<=0.005 MIU/L-ACNC: 1.98 MU/L (ref 0.4–4)

## 2018-12-21 NOTE — PROGRESS NOTES
Reviewed labs.  Elevated ESR, normal CRP.  AIDA is positive so will refer to rheumatology.    Rebecca Carr,  on 12/21/2018 at 4:10 PM

## 2018-12-23 LAB — CCP AB SER IA-ACNC: <1 U/ML

## 2018-12-27 ENCOUNTER — TELEPHONE (OUTPATIENT)
Dept: ENDOCRINOLOGY | Facility: CLINIC | Age: 58
End: 2018-12-27

## 2018-12-27 NOTE — TELEPHONE ENCOUNTER
Reason for call:  Medication   If this is a refill request, has the caller requested the refill from the pharmacy already? Yes  Will the patient be using a [facility]? No  Name of the pharmacy and phone number for the current request: Refer to above caller.    Name of the medication requested: Topiramate    Other request: Wants to know what's happening and if patient needs to be seen before request can be filled. Wants a call back    Phone number to reach patient:  Other phone number:  [phone]    Best Time:  any    Can we leave a detailed message on this number?  YES

## 2018-12-28 ENCOUNTER — TELEPHONE (OUTPATIENT)
Dept: FAMILY MEDICINE | Facility: CLINIC | Age: 58
End: 2018-12-28

## 2018-12-28 NOTE — TELEPHONE ENCOUNTER
Additional Medical Information filled out for "[employer] [facility] [employer] and faxed to [phone].  Lio Wilder MA

## 2018-12-28 NOTE — TELEPHONE ENCOUNTER
Diabetic Detailed Written Order complete and faxed to [facility] at [phone].  Lio Wilder MA

## 2018-12-28 NOTE — TELEPHONE ENCOUNTER
Received refill request for topiramate (TOPAMAX    . Patient needs appointment scheduled prior to any refills. Clinic Coordinator notified and will follow up with the patient as appropriate. The pharmacy has been notified that the medication will not be refilled prior to an appointment being scheduled.        Scheduling has been notified to contact the pt for appointment.

## 2019-01-03 ENCOUNTER — TELEPHONE (OUTPATIENT)
Dept: SURGERY | Facility: CLINIC | Age: 59
End: 2019-01-03

## 2019-01-03 DIAGNOSIS — E66.01 MORBID OBESITY (H): ICD-10-CM

## 2019-01-03 RX ORDER — TOPIRAMATE 25 MG/1
50 TABLET, FILM COATED ORAL 2 TIMES DAILY
Qty: 120 TABLET | Refills: 0 | Status: SHIPPED | OUTPATIENT
Start: 2019-01-03 | End: 2019-01-14

## 2019-01-03 NOTE — TELEPHONE ENCOUNTER
Patient now has appointment scheduled for 1-14-18.    Refilled for 30 days per protocol.      Kathleen M Doege RN

## 2019-01-03 NOTE — TELEPHONE ENCOUNTER
Reason for call:  Medication   If this is a refill request, has the caller requested the refill from the pharmacy already? Yes  Will the patient be using a [facility]? No  Name of the pharmacy and phone number for the current request: Refer to above caller.     Name of the medication requested: Topiramate     Other request: Wants to know what's happening and if patient needs to be seen before request can be filled. Wants a call back     Phone number to reach patient:  Other phone number:  [phone]     Best Time:  any     Can we leave a detailed message on this number?  YES

## 2019-01-09 DIAGNOSIS — S22.31XA CLOSED FRACTURE OF ONE RIB OF RIGHT SIDE, INITIAL ENCOUNTER: ICD-10-CM

## 2019-01-09 NOTE — TELEPHONE ENCOUNTER
Requested Prescriptions   Pending Prescriptions Disp Refills     ibuprofen (ADVIL/MOTRIN) 600 MG tablet Last Written Prescription Date:  11/30/16  Last Fill Quantity: 60,  # refills: 0   Last office visit: 12/20/2018 with prescribing provider:     Future Office Visit:   Next 5 appointments (look out 90 days)    Jan 25, 2019  1:30 PM CST  Return Visit with Kraig Pedersen MD  [facility] [address]  [facility] [address]  [phone]   Jan 30, 2019 10:30 AM CST  Return Visit with Rebecca Crar DO  [facility] ([facility]) [address]  [address]  [facility] [address]  [phone]   Jan 30, 2019 10:30 AM CST  Return Visit with Lexie Gauthier  [facility] ([facility]) [address]  [address]  [facility] [address]  [phone]   Jan 30, 2019 10:30 AM CST  SHORT with Eugenia De Jesus [facility] ([facility]) [address] [facility]  [address]  [facility] [facility] [address]  [phone]          60 tablet 0     Sig: Take 1 tablet (600 mg) by mouth every 4 hours as needed for moderate pain    NSAID Medications Passed - 1/9/2019  3:39 PM       Passed - Blood pressure under 140/90 in past 12 months    BP Readings from Last 3 Encounters:   12/20/18 124/70   11/01/18 128/78   10/26/18 130/78                Passed - Normal ALT on file in past 12 months    Recent Labs   Lab Test 10/17/18  1531   ALT 23            Passed - Normal AST on file in past 12 months    Recent Labs   Lab Test 10/17/18  1531   AST 17            Passed - Recent (12 mo) or future (30 days) visit within the authorizing provider's specialty    Patient had office visit in the last 12 months or has a visit in the next 30 days with authorizing provider or within the  "authorizing provider's specialty.  See \"Patient Info\" tab in inbasket, or \"Choose Columns\" in Meds & Orders section of the refill encounter.             Passed - Patient is age 6-64 years       Passed - Normal CBC on file in past 12 months    Recent Labs   Lab Test 12/20/18  1532   WBC 6.4   RBC 3.52*   HGB 10.9*   HCT 33.5*                   Passed - Medication is active on med list       Passed - No active pregnancy on record       Passed - Normal serum creatinine on file in past 12 months    Recent Labs   Lab Test 12/20/18  1532   CR 0.99            Passed - No positive pregnancy test in past 12 months          "

## 2019-01-10 RX ORDER — IBUPROFEN 600 MG/1
600 TABLET, FILM COATED ORAL EVERY 4 HOURS PRN
Qty: 60 TABLET | Refills: 0 | Status: SHIPPED | OUTPATIENT
Start: 2019-01-10 | End: 2019-01-18

## 2019-01-14 ENCOUNTER — OFFICE VISIT (OUTPATIENT)
Dept: ENDOCRINOLOGY | Facility: CLINIC | Age: 59
End: 2019-01-14
Payer: MEDICARE

## 2019-01-14 VITALS
WEIGHT: 238.7 LBS | SYSTOLIC BLOOD PRESSURE: 127 MMHG | DIASTOLIC BLOOD PRESSURE: 66 MMHG | BODY MASS INDEX: 46.86 KG/M2 | OXYGEN SATURATION: 95 % | HEART RATE: 80 BPM | HEIGHT: 60 IN

## 2019-01-14 DIAGNOSIS — Z79.4 TYPE 2 DIABETES MELLITUS WITH MILD NONPROLIFERATIVE RETINOPATHY WITHOUT MACULAR EDEMA, WITH LONG-TERM CURRENT USE OF INSULIN, UNSPECIFIED LATERALITY (H): ICD-10-CM

## 2019-01-14 DIAGNOSIS — E66.01 MORBID OBESITY (H): ICD-10-CM

## 2019-01-14 DIAGNOSIS — E11.3299 TYPE 2 DIABETES MELLITUS WITH MILD NONPROLIFERATIVE RETINOPATHY WITHOUT MACULAR EDEMA, WITH LONG-TERM CURRENT USE OF INSULIN, UNSPECIFIED LATERALITY (H): ICD-10-CM

## 2019-01-14 DIAGNOSIS — I25.119 CORONARY ARTERY DISEASE INVOLVING NATIVE HEART WITH ANGINA PECTORIS, UNSPECIFIED VESSEL OR LESION TYPE (H): ICD-10-CM

## 2019-01-14 RX ORDER — TOPIRAMATE 25 MG/1
75 TABLET, FILM COATED ORAL 2 TIMES DAILY
Qty: 120 TABLET | Refills: 3 | Status: SHIPPED | OUTPATIENT
Start: 2019-01-14 | End: 2019-01-30

## 2019-01-14 RX ORDER — LOSARTAN POTASSIUM 100 MG/1
TABLET ORAL
Qty: 28 TABLET | Refills: 3 | Status: SHIPPED | OUTPATIENT
Start: 2019-01-14 | End: 2019-01-30

## 2019-01-14 ASSESSMENT — MIFFLIN-ST. JEOR: SCORE: 1589.24

## 2019-01-14 NOTE — TELEPHONE ENCOUNTER
"Requested Prescriptions   Pending Prescriptions Disp Refills     losartan (COZAAR) 100 MG tablet Last Written Prescription Date:  9/26/18  Last Fill Quantity: 28,  # refills: 3   Last office visit: 12/20/2018 with prescribing provider:     Future Office Visit:   Next 5 appointments (look out 90 days)    Jan 25, 2019  1:30 PM CST  Return Visit with Kraig Pedersen MD  [facility] ([facility]) [address]  [facility] [address]  [phone]   Jan 30, 2019 10:30 AM CST  Return Visit with Rebecca Carr DO  [facility] ([facility]) [address]  [address]  [facility] [address]  [phone]   Jan 30, 2019 10:30 AM CST  Return Visit with Lexie Gauthier  [facility] ([facility]) [address]  [address] [place] [address]  [phone]   Jan 30, 2019 10:30 AM CST  SHORT with Eugenia De Jesus [facility] [place] ([facility]) [address] [place] [address]  [address] [place] [address]  [phone]          28 tablet 3     Sig: TAKE 1 TABLET (100MG) BY MOUTH DAILY    Angiotensin-II Receptors Passed - 1/14/2019 11:31 AM       Passed - Blood pressure under 140/90 in past 12 months    BP Readings from Last 3 Encounters:   01/14/19 127/66   12/20/18 124/70   11/01/18 128/78                Passed - Recent (12 mo) or future (30 days) visit within the authorizing provider's specialty    Patient had office visit in the last 12 months or has a visit in the next 30 days with authorizing provider or within the authorizing provider's specialty.  See \"Patient Info\" tab in inbasket, or \"Choose Columns\" in Meds & Orders section of the refill encounter.             Passed - Medication is active on med list       Passed - Patient is age 18 or older       Passed - No " active pregnancy on record       Passed - Normal serum creatinine on file in past 12 months    Recent Labs   Lab Test 12/20/18  1532   CR 0.99            Passed - Normal serum potassium on file in past 12 months    Recent Labs   Lab Test 12/20/18  1532   POTASSIUM 4.2                   Passed - No positive pregnancy test in past 12 months        dulaglutide (TRULICITY) 1.5 MG/0.5ML pen Last Written Prescription Date:  9/26/18  Last Fill Quantity: 2 ml,  # refills: 3   Last office visit: 12/20/2018 with prescribing provider:     Future Office Visit:   Next 5 appointments (look out 90 days)    Jan 25, 2019  1:30 PM CST  Return Visit with Kraig Pedersen MD  [facility] ([facility]) [address]  [facility] [address]  [phone]   Jan 30, 2019 10:30 AM CST  Return Visit with Rebecca Carr DO  [facility] ([facility]) [address]  [address] [place] [address]  [phone]   Jan 30, 2019 10:30 AM CST  Return Visit with Lexie Gauthier  [facility] ([facility]) [address]  [address] [place] [address]  [phone]   Jan 30, 2019 10:30 AM CST  SHORT with Eugenia De Jesus [facility] [place] ([facility]) [address] [place] [address]  [address] [place] [address]  [phone]          2 mL 3     Sig: Inject 1.5 mg Subcutaneous every 7 days    GLP-1 Agonists Protocol Passed - 1/14/2019 11:31 AM       Passed - Blood pressure less than 140/90 in past 6 months    BP Readings from Last 3 Encounters:   01/14/19 127/66   12/20/18 124/70   11/01/18 128/78                Passed - LDL on file in past 12 months    Recent Labs   Lab Test 08/06/18  1038   LDL 84            Passed - Microalbumin on file in past 12 months    Recent Labs   Lab Test  "09/06/18  1207   MICROL <5   UMALCR Unable to calculate due to low value            Passed - HgbA1C in past 3 or 6 months    If HgbA1C is 8 or greater, it needs to be on file within the past 3 months.  If less than 8, must be on file within the past 6 months.     Recent Labs   Lab Test 12/20/18  1532   A1C 6.4*            Passed - Medication is active on med list       Passed - Patient is age 18 or older       Passed - No active pregnancy on record       Passed - Normal serum creatinine on file in past 12 months    Recent Labs   Lab Test 12/20/18  1532   CR 0.99            Passed - No positive pregnancy test in past 12 months       Passed - Recent (6 mo) or future (30 days) visit within the authorizing provider's specialty    Patient had office visit in the last 6 months or has a visit in the next 30 days with authorizing provider.  See \"Patient Info\" tab in inbasket, or \"Choose Columns\" in Meds & Orders section of the refill encounter.            insulin aspart (NOVOLOG FLEXPEN) 100 UNIT/ML pen Last Written Prescription Date:  12/29/17  Last Fill Quantity: 15 ml,  # refills: 11   Last office visit: 12/20/2018 with prescribing provider:     Future Office Visit:   Next 5 appointments (look out 90 days)    Jan 25, 2019  1:30 PM CST  Return Visit with Kraig Pedersen MD  [facility] ([facility]) [address]  [facility] [address]  [phone]   Jan 30, 2019 10:30 AM CST  Return Visit with Rebecca Carr DO  [facility] ([facility]) [address]  [address]  [facility] [address]  [phone]   Jan 30, 2019 10:30 AM CST  Return Visit with Lexie Gauthier  [facility] ([facility]) [address]  [address]  [facility] [address]  [phone]   Jan 30, 2019 10:30 AM CST  SHORT with Eugenia De Jesus [facility] " "[facility] ([facility]) [address] [place] [address] [phone]          15 mL 11     Sig: Inject 20 Units Subcutaneous 3 times daily (with meals) Once daily, can add additional 5 units if BGs are >500mg/dL.    Short Acting Insulin Protocol Passed - 1/14/2019 11:31 AM       Passed - Blood pressure less than 140/90 in past 6 months    BP Readings from Last 3 Encounters:   01/14/19 127/66   12/20/18 124/70   11/01/18 128/78                Passed - LDL on file in past 12 months    Recent Labs   Lab Test 08/06/18  1038   LDL 84            Passed - Microalbumin on file in past 12 months    Recent Labs   Lab Test 09/06/18  1207   MICROL <5   UMALCR Unable to calculate due to low value            Passed - Serum creatinine on file in past 12 months    Recent Labs   Lab Test 12/20/18  1532   CR 0.99            Passed - HgbA1C in past 3 or 6 months    If HgbA1C is 8 or greater, it needs to be on file within the past 3 months.  If less than 8, must be on file within the past 6 months.     Recent Labs   Lab Test 12/20/18  1532   A1C 6.4*            Passed - Medication is active on med list       Passed - Patient is age 18 or older       Passed - Recent (6 mo) or future (30 days) visit within the authorizing provider's specialty    Patient had office visit in the last 6 months or has a visit in the next 30 days with authorizing provider or within the authorizing provider's specialty.  See \"Patient Info\" tab in inbasket, or \"Choose Columns\" in Meds & Orders section of the refill encounter.              "

## 2019-01-14 NOTE — NURSING NOTE
Chief Complaint   Patient presents with     Weight Problem     RMWM     Vitals:    01/14/19 1030   BP: 127/66   Pulse: 80   SpO2: 95%   Weight: 108.3 kg (238 lb 11.2 oz)   Height: 1.524 m (5')     Body mass index is 46.62 kg/m .  Татьяна Munoz CMA

## 2019-01-14 NOTE — LETTER
"2019       RE: Nena Tang  [address] [facility] [address]     Dear Colleague,    Thank you for referring your patient, Nena Tang, to the [facility]ICAL [facility] MANAGEMENT at [facility]. Please see a copy of my visit note below.        Return Medical Weight Management Note     Nena Tang  MRN:  [id number]  :  1960  GINNY:  2019    Dear Rowena Haas,    I had the pleasure of seeing your patient Nena Tang.  She is a 58 year old female who I am continuing to see for treatment of obesity related to:       2018   I have the following co-morbidities associated with obesity: Type II Diabetes, Pre-Diabetes, Heart Disease, High Blood Pressure, High Cholesterol       INTERVAL HISTORY:  New consult 18 and weight was 237 lbs.  Weight today 238 lbs.       Lantus 48 units at bedtime and novolog 20 units TID with meals and Trulicity  Dx with Type II DM over 20 yrs ago  Last A1C 6.4 18   No low blood sugars    Feels the topiramate is helping her to eat smaller portions.  She is still drinking diet soda.  She is not hungry in the morning so she sometimes skips breakfast and her novolog    CURRENT WEIGHT:   238 lbs 11.2 oz    Wt Readings from Last 4 Encounters:   19 108.3 kg (238 lb 11.2 oz)   18 107.7 kg (237 lb 8 oz)   10/26/18 107 kg (236 lb)   10/17/18 108.9 kg (240 lb)       Height:  5' 0\"  Body Mass Index:  Body mass index is 46.62 kg/m .  Vitals:  /66   Pulse 80   Ht 1.524 m (5')   Wt 108.3 kg (238 lb 11.2 oz)   LMP 2015   SpO2 95%   BMI 46.62 kg/m         Initial consult weight was 237 on 18.  Weight change since last seen on 2018 is up 1 pounds.   Total gain is 1 pounds.    Diet and Activity Changes Since Last Visit Reviewed With Patient 2019   I have made the following changes to my diet since my last visit: no   With regards to my diet, I am still struggling with: -   I " have made the following changes to my activity/exercise since my last visit: n0   With regards to my activity/exercise, I am still struggling with: h0       MEDICATIONS:   Current Outpatient Medications   Medication     acetaminophen (TYLENOL) 500 MG tablet     albuterol (PROAIR HFA/PROVENTIL HFA/VENTOLIN HFA) 108 (90 Base) MCG/ACT Inhaler     aspirin 81 MG EC tablet     atorvastatin (LIPITOR) 80 MG tablet     benztropine (COGENTIN) 0.5 MG tablet     calcium carbonate (OS-ALLEN 600 MG [place]. [place]) 1500 (600 [place]) MG tablet     dulaglutide (TRULICITY) 1.5 MG/0.5ML pen     fluticasone (FLONASE) 50 MCG/ACT spray     gabapentin (NEURONTIN) 300 MG capsule     guaiFENesin (ROBITUSSIN) 20 mg/mL SOLN solution     ibuprofen (ADVIL/MOTRIN) 600 MG tablet     insulin aspart (NOVOLOG FLEXPEN) 100 UNIT/ML injection     insulin glargine (LANTUS SOLOSTAR) 100 UNIT/ML pen     insulin pen needle (NOVOFINE 30) 30G X 8 MM     ipratropium - albuterol 0.5 mg/2.5 mg/3 mL (DUONEB) 0.5-2.5 (3) MG/3ML neb solution     losartan (COZAAR) 100 MG tablet     melatonin 5 MG CAPS     metoprolol succinate (TOPROL-XL) 25 MG 24 hr tablet     paliperidone (INVEGA) 9 MG 24 hr tablet     polyethylene glycol (MIRALAX/GLYCOLAX) powder     ranitidine (ZANTAC) 300 MG tablet     senna-docusate (SENOKOT-S;PERICOLACE) 8.6-50 MG per tablet     sertraline (ZOLOFT) 100 MG tablet     spacer (OPTICHAMBER PATRICIA) holding chamber     SUMAtriptan (IMITREX) 25 MG tablet     topiramate (TOPAMAX) 25 MG tablet     vitamin D3 (CHOLECALCIFEROL) 94967 UNITS capsule     glucose 4 G CHEW chewable tablet     No current facility-administered medications for this visit.        Weight Loss Medication History Reviewed With Patient 9/10/2018   Which weight loss medications are you currently taking on a regular basis?  Topamax (topiramate)   Are you having any side effects from the weight loss medication that we have prescribed you? No       ASSESSMENT:   58 y.o. Female here for [facility] follow  up. Tolerating topiramate and feels she is making changes with eating smaller portions.  Lives in a group home and eats meals prepared for her.     PLAN:   Increase topiramate to 75 mg twice daily  Try to keep food log    FOLLOW-UP:    12 weeks.    Time: 15 min spent on evaluation, management, counseling, education, & motivational interviewing with greater than 50 % of the total time was spent on counseling and coordinating care    Sincerely,    Debbie Germain PA-C

## 2019-01-14 NOTE — PATIENT INSTRUCTIONS
See Debbie Germain and dietitian in 3-4 months for return medical weight management visit    Increase topiramate to 75 mg (3 tabs) in the morning and 75 mg (3 tabs) in the evening.  Can take second dose at supper time.

## 2019-01-14 NOTE — PROGRESS NOTES
"    Return Medical Weight Management Note     Nena Tang  MRN:  [id number]  :  1960  GINNY:  2019    Dear Rowena Haas,    I had the pleasure of seeing your patient Nena Tang.  She is a 58 year old female who I am continuing to see for treatment of obesity related to:       2018   I have the following co-morbidities associated with obesity: Type II Diabetes, Pre-Diabetes, Heart Disease, High Blood Pressure, High Cholesterol       INTERVAL HISTORY:  New consult 18 and weight was 237 lbs.  Weight today 238 lbs.       Lantus 48 units at bedtime and novolog 20 units TID with meals and Trulicity  Dx with Type II DM over 20 yrs ago  Last A1C 6.4 18   No low blood sugars    Feels the topiramate is helping her to eat smaller portions.  She is still drinking diet soda.  She is not hungry in the morning so she sometimes skips breakfast and her novolog    CURRENT WEIGHT:   238 lbs 11.2 oz    Wt Readings from Last 4 Encounters:   19 108.3 kg (238 lb 11.2 oz)   18 107.7 kg (237 lb 8 oz)   10/26/18 107 kg (236 lb)   10/17/18 108.9 kg (240 lb)       Height:  5' 0\"  Body Mass Index:  Body mass index is 46.62 kg/m .  Vitals:  /66   Pulse 80   Ht 1.524 m (5')   Wt 108.3 kg (238 lb 11.2 oz)   LMP 2015   SpO2 95%   BMI 46.62 kg/m        Initial consult weight was 237 on 18.  Weight change since last seen on 2018 is up 1 pounds.   Total gain is 1 pounds.    Diet and Activity Changes Since Last Visit Reviewed With Patient 2019   I have made the following changes to my diet since my last visit: no   With regards to my diet, I am still struggling with: -   I have made the following changes to my activity/exercise since my last visit: n0   With regards to my activity/exercise, I am still struggling with: h0       MEDICATIONS:   Current Outpatient Medications   Medication     acetaminophen (TYLENOL) 500 MG tablet     albuterol (PROAIR HFA/PROVENTIL " HFA/VENTOLIN HFA) 108 (90 Base) MCG/ACT Inhaler     aspirin 81 MG EC tablet     atorvastatin (LIPITOR) 80 MG tablet     benztropine (COGENTIN) 0.5 MG tablet     calcium carbonate (OS-ALLEN 600 MG [place]. [place]) 1500 (600 [place]) MG tablet     dulaglutide (TRULICITY) 1.5 MG/0.5ML pen     fluticasone (FLONASE) 50 MCG/ACT spray     gabapentin (NEURONTIN) 300 MG capsule     guaiFENesin (ROBITUSSIN) 20 mg/mL SOLN solution     ibuprofen (ADVIL/MOTRIN) 600 MG tablet     insulin aspart (NOVOLOG FLEXPEN) 100 UNIT/ML injection     insulin glargine (LANTUS SOLOSTAR) 100 UNIT/ML pen     insulin pen needle (NOVOFINE 30) 30G X 8 MM     ipratropium - albuterol 0.5 mg/2.5 mg/3 mL (DUONEB) 0.5-2.5 (3) MG/3ML neb solution     losartan (COZAAR) 100 MG tablet     melatonin 5 MG CAPS     metoprolol succinate (TOPROL-XL) 25 MG 24 hr tablet     paliperidone (INVEGA) 9 MG 24 hr tablet     polyethylene glycol (MIRALAX/GLYCOLAX) powder     ranitidine (ZANTAC) 300 MG tablet     senna-docusate (SENOKOT-S;PERICOLACE) 8.6-50 MG per tablet     sertraline (ZOLOFT) 100 MG tablet     spacer (OPTICHAMBER PATRICIA) holding chamber     SUMAtriptan (IMITREX) 25 MG tablet     topiramate (TOPAMAX) 25 MG tablet     vitamin D3 (CHOLECALCIFEROL) 12053 UNITS capsule     glucose 4 G CHEW chewable tablet     No current facility-administered medications for this visit.        Weight Loss Medication History Reviewed With Patient 9/10/2018   Which weight loss medications are you currently taking on a regular basis?  Topamax (topiramate)   Are you having any side effects from the weight loss medication that we have prescribed you? No       ASSESSMENT:   58 y.o. Female here for [facility] follow up. Tolerating topiramate and feels she is making changes with eating smaller portions.  Lives in a group home and eats meals prepared for her.     PLAN:   Increase topiramate to 75 mg twice daily  Try to keep food log    FOLLOW-UP:    12 weeks.    Time: 15 min spent on evaluation, management,  counseling, education, & motivational interviewing with greater than 50 % of the total time was spent on counseling and coordinating care    Sincerely,    Debbei Germain PA-C

## 2019-01-14 NOTE — TELEPHONE ENCOUNTER
Medication Refill Request    Medication(s) requested:  losartan (COZAAR) 100 MG tablet  dulaglutide (TRULICITY) 1.5 MG/0.5ML pen  insulin aspart (NOVOLOG FLEXPEN) 100 UNIT/ML pen     Last Office Visit: 12/20/18  Next Office Visit: 1/30/19  Labs: up to date   Recent BP checks WNL and stable.     Rx approved and refilled per [facility] refill protocol.    Corine Cunha RN  01/14/19  11:40 AM

## 2019-01-18 ENCOUNTER — HOSPITAL ENCOUNTER (OUTPATIENT)
Dept: GENERAL RADIOLOGY | Facility: CLINIC | Age: 59
Discharge: HOME | End: 2019-01-18
Admitting: NURSE PRACTITIONER
Payer: MEDICARE

## 2019-01-18 ENCOUNTER — OFFICE VISIT (OUTPATIENT)
Dept: FAMILY MEDICINE | Facility: CLINIC | Age: 59
End: 2019-01-18
Payer: MEDICARE

## 2019-01-18 VITALS
RESPIRATION RATE: 20 BRPM | BODY MASS INDEX: 47.51 KG/M2 | HEART RATE: 85 BPM | HEIGHT: 60 IN | OXYGEN SATURATION: 98 % | DIASTOLIC BLOOD PRESSURE: 60 MMHG | WEIGHT: 242 LBS | TEMPERATURE: 97.7 F | SYSTOLIC BLOOD PRESSURE: 100 MMHG

## 2019-01-18 DIAGNOSIS — M25.511 BILATERAL SHOULDER PAIN, UNSPECIFIED CHRONICITY: ICD-10-CM

## 2019-01-18 DIAGNOSIS — M25.512 BILATERAL SHOULDER PAIN, UNSPECIFIED CHRONICITY: ICD-10-CM

## 2019-01-18 DIAGNOSIS — R76.8 ELEVATED ANTINUCLEAR ANTIBODY (ANA) LEVEL: Primary | ICD-10-CM

## 2019-01-18 LAB — ERYTHROCYTE [SEDIMENTATION RATE] IN BLOOD BY WESTERGREN METHOD: 42 MM/H (ref 0–30)

## 2019-01-18 PROCEDURE — 36415 COLL VENOUS BLD VENIPUNCTURE: CPT | Performed by: NURSE PRACTITIONER

## 2019-01-18 PROCEDURE — 85652 RBC SED RATE AUTOMATED: CPT | Performed by: NURSE PRACTITIONER

## 2019-01-18 PROCEDURE — 86038 ANTINUCLEAR ANTIBODIES: CPT | Performed by: NURSE PRACTITIONER

## 2019-01-18 PROCEDURE — 86039 ANTINUCLEAR ANTIBODIES (ANA): CPT | Performed by: NURSE PRACTITIONER

## 2019-01-18 PROCEDURE — 73030 X-RAY EXAM OF SHOULDER: CPT | Mod: 50

## 2019-01-18 PROCEDURE — 99214 OFFICE O/P EST MOD 30 MIN: CPT | Performed by: NURSE PRACTITIONER

## 2019-01-18 RX ORDER — MELOXICAM 15 MG/1
15 TABLET ORAL DAILY
Qty: 90 TABLET | Refills: 3 | Status: SHIPPED | OUTPATIENT
Start: 2019-01-18 | End: 2019-02-08 | Stop reason: ALTCHOICE

## 2019-01-18 ASSESSMENT — MIFFLIN-ST. JEOR: SCORE: 1604.2

## 2019-01-18 ASSESSMENT — PAIN SCALES - GENERAL: PAINLEVEL: NO PAIN (0)

## 2019-01-18 NOTE — PATIENT INSTRUCTIONS
We can follow up about your Rheumatology appointment.  We will get an xray of your shoulders.   Start Mobic once daily with food.   We will repeat labs today, and we will be in touch with results.

## 2019-01-18 NOTE — PROGRESS NOTES
SUBJECTIVE:                                                    Nena Tang is a 58 year old female with a complex medical history including type II DM, CAD, hypertension and schizoaffective disorder who presents to clinic today for the following health issues:  [facility]: none     Patient comes in today to follow up on bilateral shoulder pain which has been occurring over the course of the past month. She was seen by another provider, Dr. Carr, for this on 12/20. At that time, work up was notable for elevated ESR and positive AIDA. A referral was placed to see Rheumatology, but the patient has not been seen or scheduled yet. Patient denies any change in her symptoms, although she feels the pain has slightly worsened. Pain located to bilateral anterior scapular region. ROM is limited with adduction of joint. Continues to deny fevers, neck pain, rashes. Occasional numbness to both hands.                 Problems taking medications regularly: No    Medication side effects: none    Diet: regular (no restrictions)    Social History     Tobacco Use     Smoking status: Never Smoker     Smokeless tobacco: Never Used   Substance Use Topics     Alcohol use: No     Comment: last month        Problem list and histories reviewed & adjusted, as indicated.  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 (H)     Illiterate     Irritable bowel syndrome     overweight - BMI >35     Takotsubo cardiomyopathy     CAD (coronary artery disease)     Restless legs syndrome (RLS)     CINDY (obstructive sleep apnea)- mild AHI 10.3     Verbal auditory hallucination     Chronic low back pain     Schizoaffective disorder, depressive type (H)     Migraine headache     HTN, goal below 140/90     [facility]      Cervicalgia     Cocaine abuse, episodic (H)     Suicidal ideation     Esophageal reflux     Mild nonproliferative diabetic retinopathy (H)     Tardive dyskinesia     Alcohol use     Left cataract     Falls frequently     History of uterine cancer     Psychophysiological insomnia     Dysuria     Asymptomatic postmenopausal status     Abdominal pain, right lower quadrant     Infectious encephalopathy     Non-intractable vomiting with nausea     Thoughts of self harm     Gastroenteritis     Posttraumatic stress disorder     Cervical cancer screening     Type 2 diabetes mellitus with stage 3 chronic kidney disease, with long-term current use of insulin (H)     Past Surgical History:   Procedure Laterality Date     C OOPHORECTORMY FOR MALIG, W/BX  1983    UTERINE     CATARACT IOL, RT/LT Bilateral 2017     COLONOSCOPY N/A 3/16/2017    Procedure: COLONOSCOPY;  Surgeon: Traci Gonzalez MD;  Location:  GI     Coronary CTA  5/21/2014     HYSTERECTOMY  1983    uterine cancer yearly pap's per provider.     LAPAROSCOPIC CHOLECYSTECTOMY  2008     PHACOEMULSIFICATION CLEAR CORNEA WITH STANDARD INTRAOCULAR LENS IMPLANT Left 5/5/2017    Procedure: PHACOEMULSIFICATION CLEAR CORNEA WITH STANDARD INTRAOCULAR LENS IMPLANT;  LEFT EYE PHACOEMULSIFICATION CLEAR CORNEA WITH STANDARD INTRAOCULAR LENS IMPLANT ;  Surgeon: Tyra Diaz MD;  Location:  [facility]     PHACOEMULSIFICATION CLEAR CORNEA WITH STANDARD INTRAOCULAR LENS IMPLANT Right 6/30/2017    Procedure: PHACOEMULSIFICATION CLEAR CORNEA WITH STANDARD INTRAOCULAR LENS IMPLANT;  RIGHT EYE PHACOEMULSIFICATION CLEAR CORNEA WITH STANDARD INTRAOCULAR LENS IMPLANT;  Surgeon: Tyra Diaz MD;  Location:  [facility]     RELEASE TRIGGER FINGER  10/11/2012    Left thumb. Procedure: RELEASE TRIGGER FINGER;  LEFT THUMB TRIGGER RELEASE;  Surgeon: Tay Langley MD;  Location:  [facility]     RELEASE TRIGGER FINGER Right 12/26/2016    Procedure: RELEASE TRIGGER FINGER;  Surgeon: Garry  Albino Dee MD;  Location: [facility] OR       Social History     Tobacco Use     Smoking status: Never Smoker     Smokeless tobacco: Never Used   Substance Use Topics     Alcohol use: No     Comment: last month     Family History   Problem Relation Age of Onset     Cancer Mother         BLADDER     Respiratory Mother         COPD     Gastrointestinal Disease Mother         CIRRHOSIS OF LI BOLIVAR     Alcohol/Drug Mother      Diabetes Mother      Hypertension Mother      Lipids Mother      C.A.D. Mother      Glaucoma Mother      Alcohol/Drug Sister      Mental Illness Sister      Alcohol/Drug Sister      Psychotic Disorder Sister      Cancer Maternal Grandmother         UNKNOWN TYPE     Cancer Brother         COLON     Cancer - colorectal Brother         IN HIS LATE 30S     Alcohol/Drug Brother          OF HEROIN OVERDOSE AT AGE 22 YRS     Macular Degeneration No family hx of            Current Outpatient Medications   Medication Sig Dispense Refill     acetaminophen (TYLENOL) 500 MG tablet Take 2 tablets (1,000 mg) by mouth 3 times daily as needed for mild pain 100 tablet 3     albuterol (PROAIR HFA/PROVENTIL HFA/VENTOLIN HFA) 108 (90 Base) MCG/ACT Inhaler Inhale 2 puffs into the lungs every 6 hours as needed for shortness of breath / dyspnea or wheezing 1 Inhaler 0     aspirin 81 MG EC tablet TAKE 1 TABLET (81MG) BY MOUTH DAILY 28 tablet 3     atorvastatin (LIPITOR) 80 MG tablet TAKE 1 TABLET (80MG) BY MOUTH DAILY 30 tablet 11     benztropine (COGENTIN) 0.5 MG tablet Take 2 tablets (1 mg) by mouth 2 times daily 120 tablet 2     calcium carbonate (OS-ALLEN 600 MG [place]. CA) 1500 (600 CA) MG tablet Take 1 tablet (1,500 mg) by mouth daily 180 tablet 3     dulaglutide (TRULICITY) 1.5 MG/0.5ML pen Inject 1.5 mg Subcutaneous every 7 days 2 mL 3     fluticasone (FLONASE) 50 MCG/ACT spray Spray 1-2 sprays into both nostrils daily 1 Bottle 11     gabapentin (NEURONTIN) 300 MG capsule TAKE 2 CAPSULES (600MG) BY MOUTH THREE TIMES A   capsule 1     glucose 4 G CHEW chewable tablet Take 2 every 15 minutes for blood sugar <70mg/dL. Recheck blood sugar every 15 minutes until above 70mg/dL, then eat a substantial meal. (Patient not taking: Reported on 1/14/2019) 20 tablet 1     guaiFENesin (ROBITUSSIN) 20 mg/mL SOLN solution Take 10 mLs by mouth every 6 hours as needed for cough 236 mL 0     ibuprofen (ADVIL/MOTRIN) 600 MG tablet Take 1 tablet (600 mg) by mouth every 4 hours as needed for moderate pain 60 tablet 0     insulin aspart (NOVOLOG FLEXPEN) 100 UNIT/ML pen Inject 20 Units Subcutaneous 3 times daily (with meals) Once daily, can add additional 5 units if BGs are >500mg/dL. 15 mL 11     insulin glargine (LANTUS SOLOSTAR) 100 UNIT/ML pen Inject 48 Units Subcutaneous At Bedtime 3 mL 11     insulin pen needle (NOVOFINE 30) 30G X 8 MM USE 4 DAILY OR AS DIRECTED 400 each 0     ipratropium - albuterol 0.5 mg/2.5 mg/3 mL (DUONEB) 0.5-2.5 (3) MG/3ML neb solution Take 1 vial (3 mLs) by nebulization every 6 hours as needed for shortness of breath / dyspnea or wheezing 30 vial 0     losartan (COZAAR) 100 MG tablet TAKE 1 TABLET (100MG) BY MOUTH DAILY 28 tablet 3     melatonin 5 MG CAPS Take 2 capsules by mouth daily At dinnertime 180 capsule 2     metoprolol succinate (TOPROL-XL) 25 MG 24 hr tablet Take 1 tablet (25 mg) by mouth daily 90 tablet 1     paliperidone (INVEGA) 9 MG 24 hr tablet Take 1 tablet (9 mg) by mouth every morning 30 tablet 2     polyethylene glycol (MIRALAX/GLYCOLAX) powder TAKE 8.5 GRAMS (1/2 CAPFUL) BY MOUTH DAILY 527 g 1     ranitidine (ZANTAC) 300 MG tablet TAKE 1 TABLET (300MG) BY MOUTH AT BEDTIME 90 tablet 1     senna-docusate (SENOKOT-S;PERICOLACE) 8.6-50 MG per tablet Take 1 tablet by mouth 2 times daily as needed for constipation 100 tablet 1     sertraline (ZOLOFT) 100 MG tablet Take 2 tablets (200 mg) by mouth daily 60 tablet 2     spacer (OPTICHAMBER PATRICIA) holding chamber Holding/spacer device to use with  inhaler. 1 each 0     SUMAtriptan (IMITREX) 25 MG tablet Take 1-2 tablets (25-50 mg) by mouth at onset of headache for migraine May repeat in 2 hours. Max 8 tablets/24 hours. 12 tablet 1     topiramate (TOPAMAX) 25 MG tablet Take 3 tablets (75 mg) by mouth 2 times daily 120 tablet 3     vitamin D3 (CHOLECALCIFEROL) 70851 UNITS capsule TAKE 1 CAPSULE (50,000 UNITS) MONTHLY 12 capsule 1     Allergies   Allergen Reactions     Imidazole Antifungals Hives     Tolerates diflucan     Ketoprofen Itching     Pruritis to topical     Lisinopril Hives     Metformin Other (See Comments)     Patient hospitalized for lactic acidosis - admitting provider suspectd caused by metformin     Metronidazole Hives     Posaconazole Hives     Tolerates diflucan     Recent Labs   Lab Test 12/20/18  1532 10/19/18  0743  10/17/18  1531 08/06/18  1038  05/28/18  1625 05/22/18  1434  02/08/18  2155  11/07/17  0801  06/27/17  1358  07/13/16  1350  07/14/15  1215   A1C 6.4*  --   --   --  6.4*  --   --  6.2*   < >  --    < > 8.9*   < > 7.0*   < >  --    < > 9.1*   LDL  --   --   --   --  84  --   --   --   --   --   --   --   --  64  --  137*  --  78   HDL  --   --   --   --  46*  --   --   --   --   --   --   --   --  37*  --   --   --  39*   TRIG  --   --   --   --  215*  --   --   --   --   --   --   --   --  267*  --   --   --  151*   ALT  --   --   --  23  --   --  27  --   --  23   < > 23   < > 32   < >  --    < >  --    CR 0.99 0.89   < > 1.04  --    < > 1.16*  --   --  1.09*   < > 0.88   < > 0.78   < >  --    < > 0.67   GFRESTIMATED 63 66   < > 54*  --    < > 48*  --   --  52*   < > 66   < > 76   < >  --    < > >90  Non  GFR Calc     GFRESTBLACK 73 79   < > 66  --    < > 58*  --   --  63   < > 80   < > >90   GFR Calc     < >  --    < > >90   GFR Calc     POTASSIUM 4.2 3.9   < > 4.3  --    < > 4.5  --   --  4.3   < > 3.9   < > 4.3   < >  --    < > 4.3   TSH 1.98  --   --   --   --   --   --    --   --   --   --  3.21  --   --    < >  --    < >  --     < > = values in this interval not displayed.      BP Readings from Last 3 Encounters:   01/14/19 127/66   12/20/18 124/70   11/01/18 128/78    Wt Readings from Last 3 Encounters:   01/14/19 108.3 kg (238 lb 11.2 oz)   12/20/18 107.7 kg (237 lb 8 oz)   10/26/18 107 kg (236 lb)        Labs reviewed in EPIC  Problem list, Medication list, Allergies, and Medical/Social/Surgical histories reviewed in [facility] and updated as appropriate.     ROS: Constitutional, neuro, ENT, endocrine, pulmonary, cardiac, gastrointestinal, genitourinary, musculoskeletal, integument and psychiatric systems are negative, except as otherwise noted above in the HPI.    OBJECTIVE:                                                    LMP 01/06/2015   There is no height or weight on file to calculate BMI.  GENERAL: healthy, alert, well nourished, well hydrated, no distress    Right Shoulder Exam     Tenderness   The patient is experiencing tenderness in the acromioclavicular joint.    Range of Motion   Internal rotation 90 degrees:  20 abnormal     Other   Pulse: present      Left Shoulder Exam     Tenderness   The patient is experiencing tenderness in the acromioclavicular joint.    Range of Motion   Internal rotation 90 degrees:  20 abnormal     Other   Pulse: present               See [facility] notes     Diagnostic Test Results:  Results for orders placed or performed in visit on 01/18/19   ESR: Erythrocyte sedimentation rate   Result Value Ref Range    Sed Rate 42 (H) 0 - 30 mm/h     *Note: Due to a large number of results and/or encounters for the requested time period, some results have not been displayed. A complete set of results can be found in Results Review.        ASSESSMENT/PLAN:                                                    (R76.8) Elevated antinuclear antibody (AIDA) level  (primary encounter diagnosis)  (M25.511,  M25.512) Bilateral shoulder pain, unspecified chronicity  Comment:  recent elevated ESR and + AIDA. Patient referred to Rheumatology but has not been scheduled. Will have clinic staff follow up on status of appt. May consider PMR vs lupus vs other Rheumatologic pathology.  -discussed pros and cons of oral steroid burst. Patient declined due to risks of elevated blood sugar given her history of diabetes. Will trial Mobic 15 mg daily (stop Ibuprofen), advised to take with food.  -xray done showing right shoulder osteoarthrosis  Plan: meloxicam (MOBIC) 15 MG tablet, XR Shoulder         Bilateral G/E 2 Views       Plan: RHEUMATOLOGY REFERRAL, Anti Nuclear Mary IgG by         IFA with Reflex, ESR: Erythrocyte sedimentation        rate        -repeat ESR was stable, still elevated.            Patient Instructions:  Patient Instructions   We can follow up about your Rheumatology appointment.  We will get an xray of your shoulders.   Start Mobic once daily with food.   We will repeat labs today, and we will be in touch with results.                  ART Santos [facility]

## 2019-01-21 LAB
ANA PAT SER IF-IMP: ABNORMAL
ANA SER QL IF: POSITIVE
ANA TITR SER IF: ABNORMAL {TITER}

## 2019-01-21 NOTE — PROGRESS NOTES
[facility] Psychiatry Progress Note                                                                 Nena Tang is a 58 year old female who was referred by her primary care provider for treatment and evaluation of depression and psychosis  Therapist: N/A  PCP: Rowena Haas  Other Providers: N/A     History was provided by patient and care taker Zheng Barnes (tel # [phone]) who was a limited historian.    Pertinent Background:  See section specific documentation.  Psych critical item history includes suicidal ideation, psychosis [sxs include hallucinations], mutiple psychotropic trials, psych hosp (>5) and SUBSTANCE USE: cocaine and alcohol     Interim History                                                                                                        4, 4     She reported experiencing increased symptoms of depression and suicidal ideation a few weeks ago. At this time she was thinking of her   and her sister who has a terminal illness. She has a birthday coming up and is thinking about the people in her life that she has lost.     She reported that around this time symptoms of psychosis also increased momentarily.    Over the last two weeks or so, she reported noticing that depression has improved. She stated that symptoms of psychosis are not as bothersome as they used to be.    She talked about her sister's impending death due to cancer. She agreed to keep us posted if patient's symptoms worsen or if her sister dies.     She agreed to schedule appt with [facility] [place].    She opted to stay on her current medications for now.     Recent Symptoms:   Depression:  suicidal ideation without plan, without intent, depressed mood, anhedonia, low energy and excessive crying  Psychosis:  delusions, auditory hallucinations and visual hallucinations    Recent Substance Use:  none reported        Social/ Family History                                  [per patient report]                                  1ea,1ea   She is currently on SSDI and lives in an Adult Foster [facility] Facility. She is  for the last 3-4 years and was  about 12 years. She has two adult sons. She reported no history of abuse in her life    Medical / Surgical History                                                                                                                  Patient Active Problem List   Diagnosis     Osteopenia     Vitamin B12 deficiency without anemia     Hyperlipidemia LDL goal <100     Rotator cuff syndrome     Type 2 diabetes mellitus with mild nonproliferative retinopathy (H)     Illiterate     Irritable bowel syndrome     overweight - BMI >35     Takotsubo cardiomyopathy     CAD (coronary artery disease)     Restless legs syndrome (RLS)     CINDY (obstructive sleep apnea)- mild AHI 10.3     Verbal auditory hallucination     Chronic low back pain     Schizoaffective disorder, depressive type (H)     Migraine headache     HTN, goal below 140/90     Health Care Home     Lumbago     Cervicalgia     Cocaine abuse, episodic (H)     Suicidal ideation     Esophageal reflux     Mild nonproliferative diabetic retinopathy (H)     Tardive dyskinesia     Alcohol use     Left cataract     Falls frequently     History of uterine cancer     Psychophysiological insomnia     Dysuria     Asymptomatic postmenopausal status     Abdominal pain, right lower quadrant     Infectious encephalopathy     Non-intractable vomiting with nausea     Thoughts of self harm     Gastroenteritis     Posttraumatic stress disorder     Cervical cancer screening     Type 2 diabetes mellitus with stage 3 chronic kidney disease, with long-term current use of insulin (H)       Past Surgical History:   Procedure Laterality Date     C OOPHORECTORMY FOR RAHEL, W/BX  1983    UTERINE     CATARACT IOL, RT/LT Bilateral 2017     COLONOSCOPY N/A 3/16/2017    Procedure: COLONOSCOPY;  Surgeon: Traci Gonzalez MD;  Location:   GI     Coronary CTA  5/21/2014     HYSTERECTOMY  1983    uterine cancer yearly pap's per provider.     LAPAROSCOPIC CHOLECYSTECTOMY  2008     PHACOEMULSIFICATION CLEAR CORNEA WITH STANDARD INTRAOCULAR LENS IMPLANT Left 5/5/2017    Procedure: PHACOEMULSIFICATION CLEAR CORNEA WITH STANDARD INTRAOCULAR LENS IMPLANT;  LEFT EYE PHACOEMULSIFICATION CLEAR CORNEA WITH STANDARD INTRAOCULAR LENS IMPLANT ;  Surgeon: Tyra Diaz MD;  Location:  [facility]     PHACOEMULSIFICATION CLEAR CORNEA WITH STANDARD INTRAOCULAR LENS IMPLANT Right 6/30/2017    Procedure: PHACOEMULSIFICATION CLEAR CORNEA WITH STANDARD INTRAOCULAR LENS IMPLANT;  RIGHT EYE PHACOEMULSIFICATION CLEAR CORNEA WITH STANDARD INTRAOCULAR LENS IMPLANT;  Surgeon: Tyra Diaz MD;  Location:  [facility]     RELEASE TRIGGER FINGER  10/11/2012    Left thumb. Procedure: RELEASE TRIGGER FINGER;  LEFT THUMB TRIGGER RELEASE;  Surgeon: Tay Langley MD;  Location:  [facility]     RELEASE TRIGGER FINGER Right 12/26/2016    Procedure: RELEASE TRIGGER FINGER;  Surgeon: Albino Castañeda MD;  Location: [facility] OR        Medical Review of Systems                                                                                                    2,10   A comprehensive review of systems was performed and is negative other than noted in the HPI or interim history.    Allergy                                Imidazole antifungals; Ketoprofen; Lisinopril; Metformin; Metronidazole; and Posaconazole  Current Medications                                                                                                       Current Outpatient Medications   Medication Sig Dispense Refill     acetaminophen (TYLENOL) 500 MG tablet Take 2 tablets (1,000 mg) by mouth 3 times daily as needed for mild pain 100 tablet 3     albuterol (PROAIR HFA/PROVENTIL HFA/VENTOLIN HFA) 108 (90 Base) MCG/ACT Inhaler Inhale 2 puffs into the lungs every 6 hours as needed for shortness of breath / dyspnea or wheezing 1  Inhaler 0     aspirin 81 MG EC tablet TAKE 1 TABLET (81MG) BY MOUTH DAILY 28 tablet 3     atorvastatin (LIPITOR) 80 MG tablet TAKE 1 TABLET (80MG) BY MOUTH DAILY 30 tablet 11     benztropine (COGENTIN) 0.5 MG tablet Take 2 tablets (1 mg) by mouth 2 times daily 120 tablet 2     calcium carbonate (OS-ALLEN 600 MG [place]. CA) 1500 (600 CA) MG tablet Take 1 tablet (1,500 mg) by mouth daily 180 tablet 3     dulaglutide (TRULICITY) 1.5 MG/0.5ML pen Inject 1.5 mg Subcutaneous every 7 days 2 mL 3     fluticasone (FLONASE) 50 MCG/ACT spray Spray 1-2 sprays into both nostrils daily 1 Bottle 11     gabapentin (NEURONTIN) 300 MG capsule TAKE 2 CAPSULES (600MG) BY MOUTH THREE TIMES A  capsule 1     glucose 4 G CHEW chewable tablet Take 2 every 15 minutes for blood sugar <70mg/dL. Recheck blood sugar every 15 minutes until above 70mg/dL, then eat a substantial meal. 20 tablet 1     guaiFENesin (ROBITUSSIN) 20 mg/mL SOLN solution Take 10 mLs by mouth every 6 hours as needed for cough 236 mL 0     insulin aspart (NOVOLOG FLEXPEN) 100 UNIT/ML pen Inject 20 Units Subcutaneous 3 times daily (with meals) Once daily, can add additional 5 units if BGs are >500mg/dL. 15 mL 11     insulin glargine (LANTUS SOLOSTAR) 100 UNIT/ML pen Inject 48 Units Subcutaneous At Bedtime 3 mL 11     insulin pen needle (NOVOFINE 30) 30G X 8 MM USE 4 DAILY OR AS DIRECTED 400 each 0     ipratropium - albuterol 0.5 mg/2.5 mg/3 mL (DUONEB) 0.5-2.5 (3) MG/3ML neb solution Take 1 vial (3 mLs) by nebulization every 6 hours as needed for shortness of breath / dyspnea or wheezing 30 vial 0     losartan (COZAAR) 100 MG tablet TAKE 1 TABLET (100MG) BY MOUTH DAILY 28 tablet 3     melatonin 5 MG CAPS Take 2 capsules by mouth daily At dinnertime 180 capsule 2     meloxicam (MOBIC) 15 MG tablet Take 1 tablet (15 mg) by mouth daily 90 tablet 3     metoprolol succinate (TOPROL-XL) 25 MG 24 hr tablet Take 1 tablet (25 mg) by mouth daily 90 tablet 1     paliperidone  (INVEGA) 9 MG 24 hr tablet Take 1 tablet (9 mg) by mouth every morning 30 tablet 2     polyethylene glycol (MIRALAX/GLYCOLAX) powder TAKE 8.5 GRAMS (1/2 CAPFUL) BY MOUTH DAILY 527 g 1     ranitidine (ZANTAC) 300 MG tablet TAKE 1 TABLET (300MG) BY MOUTH AT BEDTIME 90 tablet 1     senna-docusate (SENOKOT-S;PERICOLACE) 8.6-50 MG per tablet Take 1 tablet by mouth 2 times daily as needed for constipation 100 tablet 1     sertraline (ZOLOFT) 100 MG tablet Take 2 tablets (200 mg) by mouth daily 60 tablet 2     spacer (OPTICHAMBER PATRICIA) holding chamber Holding/spacer device to use with inhaler. 1 each 0     SUMAtriptan (IMITREX) 25 MG tablet Take 1-2 tablets (25-50 mg) by mouth at onset of headache for migraine May repeat in 2 hours. Max 8 tablets/24 hours. 12 tablet 1     topiramate (TOPAMAX) 25 MG tablet Take 3 tablets (75 mg) by mouth 2 times daily 120 tablet 3     vitamin D3 (CHOLECALCIFEROL) 48280 UNITS capsule TAKE 1 CAPSULE (50,000 UNITS) MONTHLY 12 capsule 1     Vitals                                                                                                                       3, 3   LMP 01/06/2015    Mental Status Exam                                                                                    9, 14 cog gs     Appearance: casually groomed  Behavior/Demeanor: cooperative, with good  eye contact   Speech: normal  Language: intact  Psychomotor: abnormal movements of the tongue and lower jaw  Mood: depressed  Affect: full range; was congruent to mood; was congruent to content  Thought Process/Associations: unremarkable  Thought Content:  Reports none;  Denies suicidal ideation and violent ideation  Perception:  Reports none;  Denies auditory hallucinations and visual hallucinations  Insight: limited  Judgment: limited  Cognition: (6) does  appear grossly intact; formal cognitive testing was not done  grossly oriented to her circumstances  Gait and Station: unremarkable  Labs and Data                                                                                                                  PHQ9  PHQ-9 SCORE 2/3/2017 3/13/2018 10/26/2018   PHQ-9 Total Score - - -   PHQ-9 Total Score - - -   PHQ-9 Total Score 0 14 20     Diagnosis and Assessment                                                                             m2, h3     Today the following issues were addressed:    1) Psychosis  2) Grief    [place] Prescription Monitoring Program [] review was not needed today.    PSYCHOTROPIC DRUG INTERACTIONS: none clinically relevant     Diagnosis:  Schizoaffective Disorder    Plan                                                                                                                    m2, h3      Patient opted to stay on current meds. Depression has improved slightly recently compared to a few weeks ago when it was more severe.   If symptoms worsen or do not improve we can consider adding an SNRI such as Effexor XR.  Schedule appt with [facility] in 2 weeks  Continue other psychiatric medications:  Current Outpatient Medications   Medication Sig     benztropine (COGENTIN) 0.5 MG tablet Take 2 tablets (1 mg) by mouth 2 times daily     paliperidone ER (INVEGA) 9 MG 24 hr tablet Take 1 tablet (9 mg) by mouth every morning     sertraline (ZOLOFT) 100 MG tablet Take 2 tablets (200 mg) by mouth daily     acetaminophen (TYLENOL) 500 MG tablet Take 2 tablets (1,000 mg) by mouth 3 times daily as needed for mild pain     albuterol (PROAIR HFA/PROVENTIL HFA/VENTOLIN HFA) 108 (90 Base) MCG/ACT Inhaler Inhale 2 puffs into the lungs every 6 hours as needed for shortness of breath / dyspnea or wheezing     aspirin 81 MG EC tablet TAKE 1 TABLET (81MG) BY MOUTH DAILY     atorvastatin (LIPITOR) 80 MG tablet TAKE 1 TABLET (80MG) BY MOUTH DAILY     calcium carbonate (OS-ALLEN 600 MG [place]. CA) 1500 (600 CA) MG tablet Take 1 tablet (1,500 mg) by mouth daily     dulaglutide (TRULICITY) 1.5 MG/0.5ML pen Inject 1.5 mg Subcutaneous every 7  days     fluticasone (FLONASE) 50 MCG/ACT spray Spray 1-2 sprays into both nostrils daily     gabapentin (NEURONTIN) 300 MG capsule TAKE 2 CAPSULES (600MG) BY MOUTH THREE TIMES A DAY     glucose 4 G CHEW chewable tablet Take 2 every 15 minutes for blood sugar <70mg/dL. Recheck blood sugar every 15 minutes until above 70mg/dL, then eat a substantial meal.     guaiFENesin (ROBITUSSIN) 20 mg/mL SOLN solution Take 10 mLs by mouth every 6 hours as needed for cough     insulin aspart (NOVOLOG FLEXPEN) 100 UNIT/ML pen Inject 20 Units Subcutaneous 3 times daily (with meals) Once daily, can add additional 5 units if BGs are >500mg/dL.     insulin glargine (LANTUS SOLOSTAR) 100 UNIT/ML pen Inject 48 Units Subcutaneous At Bedtime     insulin pen needle (NOVOFINE 30) 30G X 8 MM USE 4 DAILY OR AS DIRECTED     ipratropium - albuterol 0.5 mg/2.5 mg/3 mL (DUONEB) 0.5-2.5 (3) MG/3ML neb solution Take 1 vial (3 mLs) by nebulization every 6 hours as needed for shortness of breath / dyspnea or wheezing     losartan (COZAAR) 100 MG tablet TAKE 1 TABLET (100MG) BY MOUTH DAILY     melatonin 5 MG CAPS Take 2 capsules by mouth daily At dinnertime     meloxicam (MOBIC) 15 MG tablet Take 1 tablet (15 mg) by mouth daily     metoprolol succinate (TOPROL-XL) 25 MG 24 hr tablet Take 1 tablet (25 mg) by mouth daily     polyethylene glycol (MIRALAX/GLYCOLAX) powder TAKE 8.5 GRAMS (1/2 CAPFUL) BY MOUTH DAILY     ranitidine (ZANTAC) 300 MG tablet TAKE 1 TABLET (300MG) BY MOUTH AT BEDTIME     senna-docusate (SENOKOT-S;PERICOLACE) 8.6-50 MG per tablet Take 1 tablet by mouth 2 times daily as needed for constipation     spacer (OPTICHAMBER PATRICIA) holding chamber Holding/spacer device to use with inhaler.     SUMAtriptan (IMITREX) 25 MG tablet Take 1-2 tablets (25-50 mg) by mouth at onset of headache for migraine May repeat in 2 hours. Max 8 tablets/24 hours.     topiramate (TOPAMAX) 25 MG tablet Take 3 tablets (75 mg) by mouth 2 times daily     vitamin  D3 (CHOLECALCIFEROL) 93138 UNITS capsule TAKE 1 CAPSULE (50,000 UNITS) MONTHLY     No current facility-administered medications for this visit.          RTC: 4 weeks with me    CRISIS NUMBERS:   Provided routinely in AVS.    Treatment Risk Statement:  The patient understands the risks, benefits, adverse effects and alternatives. Agrees to treatment with the capacity to do so. No medical contraindications to treatment. Agrees to call clinic for any problems. The patient understands to call 911 or go to the nearest ED if life threatening or urgent symptoms occur.      [facility] Individual Psychotherapy Note                                                                     [16]     Start time - 1:30pm       End time - 1:50pm    Subjective: This supportive psychotherapy session addressed issues related to depression, grief.  Patient's reaction: Contemplation in the context of mental status appropriate for ambulatory setting.  Progress: fair  Plan: RTC 4 weeks  Psychotherapy services during this visit included myself and the patient.     PROVIDER:  Kraig Pedersen MD

## 2019-01-25 ENCOUNTER — TELEPHONE (OUTPATIENT)
Dept: FAMILY MEDICINE | Facility: CLINIC | Age: 59
End: 2019-01-25

## 2019-01-25 ENCOUNTER — OFFICE VISIT (OUTPATIENT)
Dept: PSYCHIATRY | Facility: CLINIC | Age: 59
End: 2019-01-25
Payer: MEDICARE

## 2019-01-25 DIAGNOSIS — F25.0 SCHIZOAFFECTIVE DISORDER, BIPOLAR TYPE (H): ICD-10-CM

## 2019-01-25 DIAGNOSIS — R76.8 POSITIVE ANA (ANTINUCLEAR ANTIBODY): Primary | ICD-10-CM

## 2019-01-25 DIAGNOSIS — G25.9 EXTRAPYRAMIDAL AND MOVEMENT DISORDER: ICD-10-CM

## 2019-01-25 PROCEDURE — 90833 PSYTX W PT W E/M 30 MIN: CPT | Performed by: PSYCHIATRY & NEUROLOGY

## 2019-01-25 PROCEDURE — 99214 OFFICE O/P EST MOD 30 MIN: CPT | Performed by: PSYCHIATRY & NEUROLOGY

## 2019-01-25 RX ORDER — PALIPERIDONE 9 MG/1
9 TABLET, EXTENDED RELEASE ORAL EVERY MORNING
Qty: 30 TABLET | Refills: 2 | Status: SHIPPED | OUTPATIENT
Start: 2019-01-25 | End: 2019-04-08

## 2019-01-25 RX ORDER — SERTRALINE HYDROCHLORIDE 100 MG/1
200 TABLET, FILM COATED ORAL DAILY
Qty: 60 TABLET | Refills: 2 | Status: SHIPPED | OUTPATIENT
Start: 2019-01-25 | End: 2019-04-08

## 2019-01-25 RX ORDER — BENZTROPINE MESYLATE 0.5 MG/1
1 TABLET ORAL 2 TIMES DAILY
Qty: 120 TABLET | Refills: 2 | Status: SHIPPED | OUTPATIENT
Start: 2019-01-25 | End: 2019-04-08

## 2019-01-25 NOTE — TELEPHONE ENCOUNTER
Writer spoke with Rheumatology clinic to inquire why they haven't been reaching out to pt, they did reach out twice on 12/27/18 and 1/21/19 on both of these calls pt declined an appt. Writer did try to schedule an appt for pt, but was told due to new policy I can't schedule. They will reach out to pt in 2 weeks, and another referral is needed.        Giorgi Case

## 2019-01-25 NOTE — PATIENT INSTRUCTIONS
Continue current psychiatric medications.  Please call us if depression worsens.  Current Outpatient Medications   Medication Sig     benztropine (COGENTIN) 0.5 MG tablet Take 2 tablets (1 mg) by mouth 2 times daily     paliperidone ER (INVEGA) 9 MG 24 hr tablet Take 1 tablet (9 mg) by mouth every morning     sertraline (ZOLOFT) 100 MG tablet Take 2 tablets (200 mg) by mouth daily     acetaminophen (TYLENOL) 500 MG tablet Take 2 tablets (1,000 mg) by mouth 3 times daily as needed for mild pain     albuterol (PROAIR HFA/PROVENTIL HFA/VENTOLIN HFA) 108 (90 Base) MCG/ACT Inhaler Inhale 2 puffs into the lungs every 6 hours as needed for shortness of breath / dyspnea or wheezing     aspirin 81 MG EC tablet TAKE 1 TABLET (81MG) BY MOUTH DAILY     atorvastatin (LIPITOR) 80 MG tablet TAKE 1 TABLET (80MG) BY MOUTH DAILY     calcium carbonate (OS-ALLEN 600 MG [place]. CA) 1500 (600 CA) MG tablet Take 1 tablet (1,500 mg) by mouth daily     dulaglutide (TRULICITY) 1.5 MG/0.5ML pen Inject 1.5 mg Subcutaneous every 7 days     fluticasone (FLONASE) 50 MCG/ACT spray Spray 1-2 sprays into both nostrils daily     gabapentin (NEURONTIN) 300 MG capsule TAKE 2 CAPSULES (600MG) BY MOUTH THREE TIMES A DAY     glucose 4 G CHEW chewable tablet Take 2 every 15 minutes for blood sugar <70mg/dL. Recheck blood sugar every 15 minutes until above 70mg/dL, then eat a substantial meal.     guaiFENesin (ROBITUSSIN) 20 mg/mL SOLN solution Take 10 mLs by mouth every 6 hours as needed for cough     insulin aspart (NOVOLOG FLEXPEN) 100 UNIT/ML pen Inject 20 Units Subcutaneous 3 times daily (with meals) Once daily, can add additional 5 units if BGs are >500mg/dL.     insulin glargine (LANTUS SOLOSTAR) 100 UNIT/ML pen Inject 48 Units Subcutaneous At Bedtime     insulin pen needle (NOVOFINE 30) 30G X 8 MM USE 4 DAILY OR AS DIRECTED     ipratropium - albuterol 0.5 mg/2.5 mg/3 mL (DUONEB) 0.5-2.5 (3) MG/3ML neb solution Take 1 vial (3 mLs) by nebulization every 6  hours as needed for shortness of breath / dyspnea or wheezing     losartan (COZAAR) 100 MG tablet TAKE 1 TABLET (100MG) BY MOUTH DAILY     melatonin 5 MG CAPS Take 2 capsules by mouth daily At dinnertime     meloxicam (MOBIC) 15 MG tablet Take 1 tablet (15 mg) by mouth daily     metoprolol succinate (TOPROL-XL) 25 MG 24 hr tablet Take 1 tablet (25 mg) by mouth daily     polyethylene glycol (MIRALAX/GLYCOLAX) powder TAKE 8.5 GRAMS (1/2 CAPFUL) BY MOUTH DAILY     ranitidine (ZANTAC) 300 MG tablet TAKE 1 TABLET (300MG) BY MOUTH AT BEDTIME     senna-docusate (SENOKOT-S;PERICOLACE) 8.6-50 MG per tablet Take 1 tablet by mouth 2 times daily as needed for constipation     spacer (OPTICHAMBER PATRICIA) holding chamber Holding/spacer device to use with inhaler.     SUMAtriptan (IMITREX) 25 MG tablet Take 1-2 tablets (25-50 mg) by mouth at onset of headache for migraine May repeat in 2 hours. Max 8 tablets/24 hours.     topiramate (TOPAMAX) 25 MG tablet Take 3 tablets (75 mg) by mouth 2 times daily     vitamin D3 (CHOLECALCIFEROL) 43273 UNITS capsule TAKE 1 CAPSULE (50,000 UNITS) MONTHLY     No current facility-administered medications for this visit.      24/7 Crisis Hotlines:    National Suicide Prevention Hotline   263-201-TODH (4554)    Crisis Hotlines by [facility]:    [facility] Crisis Line     [phone]  [facility] Crisis Line   [phone]  [facility] Crisis Line     [phone]  [place] [facility] for Adults   [phone]  [place] for Children    [phone]  [place] for Adults    [phone]  [place] for Children    [phone]  [facility] Crisis Line    [phone]    [facility] Crisis Response Team  [phone] ([phone])  [facility] Crisis is available 24 hours a day. The team provides callers with a needs assessment and referrals to appropriate resources. If medically necessary, the Crisis Response Team assists individuals by traveling to their location to provide face-to-face  interventions and assessments. Crisis services are available for adults and children in [facility] and [facility]. Adult Crisis beds are also available if appropriate.    Walk-In Centers and Mobile Teams  [facility] Walk-In Crisis Intervention  [phone]  [facility] (18+) Crisis Mobile Team    [phone]  [facility] Child (under 17) Crisis Mobile Team [phone]  [facility] for Adults Walk-In & Mobile Teams [phone]    Additional Crisis Hotlines:  [facility]      [phone]  Crisis Connection      [phone]  EMACS ([facility] Crisis Services)  [phone]  [facility] Social Service (S)    [phone]  Men s Crisis Line      468-651-YIAW ([phone])  National Suicide Prevention Hotline   722-879-SKEK (3682)  [facility] Crisis Line    [phone]  Suicide Hotline      [phone]  [facility] (formerly [facility])  Dial 2-1-2 (617-040-0120)    Domestic Violence, Rape and Sexual Abuse Hotlines:  [place] Crisis Line     [phone]  [place] Helpline  [phone]  Domestic Abuse Project (DAP)    0-316-131-9553*  [facility] Crisis Line     [phone]  [place] [facility]  4-600-295-4163*  [place] [facility]       [phone] ([phone]*)  National Domestic Violence Hotline   8-724-754-SAFE  Rape[facility]ual Abuse [facility] Crisis Line    [phone]   Sexual Violence Center (SVC)    [phone]  [employer]     [phone] ([phone]*)

## 2019-01-29 NOTE — PROGRESS NOTES
"SUBJECTIVE:                                                    Nena Tang is a 58 year old female who presents to clinic today for the following health issues:  [facility]: [place]    Here for routinely scheduled follow-up.      1.  Fall this morning:  Fall in bathroom this morning - she got up from bed, walked to bathroom and \"blacked out\" and fell.  Had to crawl to reach handle bar to get herself up.  Felt short of breath right before this happened.  No chest pain or heart palpitations.  No dizziness.  She remembers falling.  Denies loss of consciousness.  Landed on her butt.  Hit her head and back on the bathtub (though report from [facility] states that she denied hitting head).  This has not happened before.   She denies headache, vision changes, speech changes, numbness/tingling/weakness.  Right now she denies any significant pain other than her ongoing shoulder pain (unchanged).    2.  Needs to have melatonin prescription changed from \"dinnertime\" to \"bedtime.\"  This does help with sleep but she feels too sleepy if she takes it early.    3.  Left hand twitching for several months.  No loss of sensation or numbness/tingling.  She drops things from that hand.  She has had issues with extrapyramidal side effects in the past (leg twitching and jaw thrusting) but never issues with the hand.      4.  Bilateral shoulder pain with elevated ESR and +AIDA:   Taking meloxicam and tylenol for pain in shoulders.  This is somewhat helpful but pain persists.  She does not have an appointment scheduled with rheumatology.      5.  Diabetes:  Blood sugars reviewed by [facility] pharmacist:  No lows.  High on days she misses insulin when out of the house (goes to drop in center).  Most recent A1C on 12/20 was 6.4.    6.  History of anemia:  Review of records shows a chronic normocytic anemia since at least 2015 with a baseline of 10-11.  I do not see any clearly identified etiology.  She did have a negative colonoscopy in 2017 (no " "polyps, recommended repeat in 10 years).    Mental Health Follow up - depression, schizoaffective disord    Status since last visit: low, \"terrible.\" Some SI a month ago - talked to counselor at walk-in center which was helpful.  She denies SI currently.       See PHQ-9 for current symptoms.  Other associated symptoms: None    Complicating factors: sister still very ill, in and out of hospital  Significant life event:  No   Current substance abuse:  None  Anxiety or Panic symptoms:  No    Trouble sleeping - has ongoing hallucination/delusion that there is a man in her room that will strangle her.  This is improved by use of nightlight.  However, she is still afraid to use CPAP due to fear that he will use CPAP to strangle her.  She is aware that this man is not real.      PHQ-9  English PHQ-9   Any Language             Problems taking medications regularly: Yes - will often miss insulin doses if out and about    Medication side effects: none    Diet: regular (no restrictions)    Social History     Tobacco Use     Smoking status: Never Smoker     Smokeless tobacco: Never Used   Substance Use Topics     Alcohol use: No     Comment: last month        Problem list and histories reviewed & adjusted, as indicated.  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 (H)     Illiterate     Irritable bowel syndrome     overweight - BMI >35     Takotsubo cardiomyopathy     CAD (coronary artery disease)     Restless legs syndrome (RLS)     CINDY (obstructive sleep apnea)- mild AHI 10.3     Verbal auditory hallucination     Chronic low back pain     Schizoaffective disorder, depressive type (H)     Migraine headache     HTN, goal below 140/90     Health Care Home     Lumbago     Cervicalgia     Cocaine abuse, episodic (H) "     Suicidal ideation     Esophageal reflux     Mild nonproliferative diabetic retinopathy (H)     Tardive dyskinesia     Alcohol use     Left cataract     Falls frequently     History of uterine cancer     Psychophysiological insomnia     Dysuria     Asymptomatic postmenopausal status     Abdominal pain, right lower quadrant     Infectious encephalopathy     Non-intractable vomiting with nausea     Thoughts of self harm     Gastroenteritis     Posttraumatic stress disorder     Cervical cancer screening     Type 2 diabetes mellitus with stage 3 chronic kidney disease, with long-term current use of insulin (H)     Past Surgical History:   Procedure Laterality Date     C OOPHORECTORMY FOR MALIG, W/BX  1983    UTERINE     CATARACT IOL, RT/LT Bilateral 2017     COLONOSCOPY N/A 3/16/2017    Procedure: COLONOSCOPY;  Surgeon: Traci Gonzalez MD;  Location:  GI     Coronary CTA  5/21/2014     HYSTERECTOMY  1983    uterine cancer yearly pap's per provider.     LAPAROSCOPIC CHOLECYSTECTOMY  2008     PHACOEMULSIFICATION CLEAR CORNEA WITH STANDARD INTRAOCULAR LENS IMPLANT Left 5/5/2017    Procedure: PHACOEMULSIFICATION CLEAR CORNEA WITH STANDARD INTRAOCULAR LENS IMPLANT;  LEFT EYE PHACOEMULSIFICATION CLEAR CORNEA WITH STANDARD INTRAOCULAR LENS IMPLANT ;  Surgeon: Tyra Diaz MD;  Location: [facility]     PHACOEMULSIFICATION CLEAR CORNEA WITH STANDARD INTRAOCULAR LENS IMPLANT Right 6/30/2017    Procedure: PHACOEMULSIFICATION CLEAR CORNEA WITH STANDARD INTRAOCULAR LENS IMPLANT;  RIGHT EYE PHACOEMULSIFICATION CLEAR CORNEA WITH STANDARD INTRAOCULAR LENS IMPLANT;  Surgeon: Tyra Diaz MD;  Location:  [facility]     RELEASE TRIGGER FINGER  10/11/2012    Left thumb. Procedure: RELEASE TRIGGER FINGER;  LEFT THUMB TRIGGER RELEASE;  Surgeon: Tay Langley MD;  Location:  [facility]     RELEASE TRIGGER FINGER Right 12/26/2016    Procedure: RELEASE TRIGGER FINGER;  Surgeon: Albino Castañeda MD;  Location: [facility]        Social History     Tobacco Use     Smoking status: Never Smoker     Smokeless tobacco: Never Used   Substance Use Topics     Alcohol use: No     Comment: last month     Family History   Problem Relation Age of Onset     Cancer Mother         BLADDER     Respiratory Mother         COPD     Gastrointestinal Disease Mother         CIRRHOSIS OF LI BOLIVAR     Alcohol/Drug Mother      Diabetes Mother      Hypertension Mother      Lipids Mother      C.A.D. Mother      Glaucoma Mother      Alcohol/Drug Sister      Mental Illness Sister      Alcohol/Drug Sister      Psychotic Disorder Sister      Cancer Maternal Grandmother         UNKNOWN TYPE     Cancer Brother         COLON     Cancer - colorectal Brother         IN HIS LATE 30S     Alcohol/Drug Brother          OF HEROIN OVERDOSE AT AGE 22 YRS     Macular Degeneration No family hx of            Current Outpatient Medications   Medication Sig Dispense Refill     acetaminophen (TYLENOL) 500 MG tablet Take 2 tablets (1,000 mg) by mouth 3 times daily as needed for mild pain 100 tablet 3     albuterol (PROAIR HFA/PROVENTIL HFA/VENTOLIN HFA) 108 (90 Base) MCG/ACT Inhaler Inhale 2 puffs into the lungs every 6 hours as needed for shortness of breath / dyspnea or wheezing 1 Inhaler 0     aspirin 81 MG EC tablet TAKE 1 TABLET (81MG) BY MOUTH DAILY 28 tablet 3     atorvastatin (LIPITOR) 80 MG tablet TAKE 1 TABLET (80MG) BY MOUTH DAILY 30 tablet 11     benztropine (COGENTIN) 0.5 MG tablet Take 2 tablets (1 mg) by mouth 2 times daily 120 tablet 2     calcium carbonate (OS-ALLEN 600 MG [place]. [place]) 1500 (600 CA) MG tablet Take 1 tablet (1,500 mg) by mouth daily 180 tablet 3     dulaglutide (TRULICITY) 1.5 MG/0.5ML pen Inject 1.5 mg Subcutaneous every 7 days 2 mL 3     fluticasone (FLONASE) 50 MCG/ACT spray Spray 1-2 sprays into both nostrils daily 1 Bottle 11     gabapentin (NEURONTIN) 300 MG capsule TAKE 2 CAPSULES (600MG) BY MOUTH THREE TIMES A  capsule 1     glucose 4 G CHEW  chewable tablet Take 2 every 15 minutes for blood sugar <70mg/dL. Recheck blood sugar every 15 minutes until above 70mg/dL, then eat a substantial meal. 20 tablet 1     guaiFENesin (ROBITUSSIN) 20 mg/mL SOLN solution Take 10 mLs by mouth every 6 hours as needed for cough 236 mL 0     insulin aspart (NOVOLOG FLEXPEN) 100 UNIT/ML pen Inject 20 Units Subcutaneous 3 times daily (with meals) Once daily, can add additional 5 units if BGs are >500mg/dL. 15 mL 11     insulin glargine (LANTUS SOLOSTAR) 100 UNIT/ML pen Inject 48 Units Subcutaneous At Bedtime 3 mL 11     insulin pen needle (NOVOFINE 30) 30G X 8 MM USE 4 DAILY OR AS DIRECTED 400 each 0     ipratropium - albuterol 0.5 mg/2.5 mg/3 mL (DUONEB) 0.5-2.5 (3) MG/3ML neb solution Take 1 vial (3 mLs) by nebulization every 6 hours as needed for shortness of breath / dyspnea or wheezing 30 vial 0     losartan (COZAAR) 100 MG tablet TAKE 1 TABLET (100MG) BY MOUTH DAILY 28 tablet 3     melatonin 5 MG CAPS Take 2 capsules by mouth daily At dinnertime 180 capsule 2     meloxicam (MOBIC) 15 MG tablet Take 1 tablet (15 mg) by mouth daily 90 tablet 3     metoprolol succinate (TOPROL-XL) 25 MG 24 hr tablet Take 1 tablet (25 mg) by mouth daily 90 tablet 1     paliperidone ER (INVEGA) 9 MG 24 hr tablet Take 1 tablet (9 mg) by mouth every morning 30 tablet 2     polyethylene glycol (MIRALAX/GLYCOLAX) powder TAKE 8.5 GRAMS (1/2 CAPFUL) BY MOUTH DAILY 527 g 1     ranitidine (ZANTAC) 300 MG tablet TAKE 1 TABLET (300MG) BY MOUTH AT BEDTIME 90 tablet 1     senna-docusate (SENOKOT-S;PERICOLACE) 8.6-50 MG per tablet Take 1 tablet by mouth 2 times daily as needed for constipation 100 tablet 1     sertraline (ZOLOFT) 100 MG tablet Take 2 tablets (200 mg) by mouth daily 60 tablet 2     spacer (OPTICHAMBER PATRICIA) holding chamber Holding/spacer device to use with inhaler. 1 each 0     SUMAtriptan (IMITREX) 25 MG tablet Take 1-2 tablets (25-50 mg) by mouth at onset of headache for migraine May  repeat in 2 hours. Max 8 tablets/24 hours. 12 tablet 1     vitamin D3 (CHOLECALCIFEROL) 59338 UNITS capsule TAKE 1 CAPSULE (50,000 UNITS) MONTHLY 12 capsule 1     topiramate (TOPAMAX) 25 MG tablet Take 3 tablets (75 mg) by mouth 2 times daily 180 tablet 3     Allergies   Allergen Reactions     Imidazole Antifungals Hives     Tolerates diflucan     Ketoprofen Itching     Pruritis to topical     Lisinopril Hives     Metformin Other (See Comments)     Patient hospitalized for lactic acidosis - admitting provider suspectd caused by metformin     Metronidazole Hives     Posaconazole Hives     Tolerates diflucan     Recent Labs   Lab Test 12/20/18  1532 10/19/18  0743  10/17/18  1531 08/06/18  1038  05/28/18  1625 05/22/18  1434  02/08/18  2155  11/07/17  0801  06/27/17  1358  07/13/16  1350  07/14/15  1215   A1C 6.4*  --   --   --  6.4*  --   --  6.2*   < >  --    < > 8.9*   < > 7.0*   < >  --    < > 9.1*   LDL  --   --   --   --  84  --   --   --   --   --   --   --   --  64  --  137*  --  78   HDL  --   --   --   --  46*  --   --   --   --   --   --   --   --  37*  --   --   --  39*   TRIG  --   --   --   --  215*  --   --   --   --   --   --   --   --  267*  --   --   --  151*   ALT  --   --   --  23  --   --  27  --   --  23   < > 23   < > 32   < >  --    < >  --    CR 0.99 0.89   < > 1.04  --    < > 1.16*  --   --  1.09*   < > 0.88   < > 0.78   < >  --    < > 0.67   GFRESTIMATED 63 66   < > 54*  --    < > 48*  --   --  52*   < > 66   < > 76   < >  --    < > >90  Non  GFR Calc     GFRESTBLACK 73 79   < > 66  --    < > 58*  --   --  63   < > 80   < > >90   GFR Calc     < >  --    < > >90   GFR Calc     POTASSIUM 4.2 3.9   < > 4.3  --    < > 4.5  --   --  4.3   < > 3.9   < > 4.3   < >  --    < > 4.3   TSH 1.98  --   --   --   --   --   --   --   --   --   --  3.21  --   --    < >  --    < >  --     < > = values in this interval not displayed.      BP Readings from Last 3  "Encounters:   01/30/19 102/58   01/18/19 100/60   01/14/19 127/66    Wt Readings from Last 3 Encounters:   01/30/19 108.6 kg (239 lb 8 oz)   01/18/19 109.8 kg (242 lb)   01/14/19 108.3 kg (238 lb 11.2 oz)        Labs reviewed in EPIC  Problem list, Medication list, Allergies, and Medical/Social/Surgical histories reviewed in [facility] and updated as appropriate.     ROS: Constitutional, neuro, ENT, endocrine, pulmonary, cardiac, gastrointestinal, genitourinary, musculoskeletal, integument and psychiatric systems are negative, except as otherwise noted above in the HPI.    OBJECTIVE:                                                    /58   Pulse 92   Temp 98.2  F (36.8  C) (Oral)   Resp 16   Ht 1.524 m (5')   Wt 108.6 kg (239 lb 8 oz)   LMP 01/06/2015   SpO2 99%   BMI 46.77 kg/m    Body mass index is 46.77 kg/m .  GENERAL: healthy, alert, well nourished, well hydrated, no distress  EYES: Eyes grossly normal to inspection, extraocular movements - intact, and PERRL  HENT: normocephalic, atraumatic,  Nose- normal; Mouth- no ulcers, no lesions  NECK: no tenderness, no adenopathy, no asymmetry, no masses, no stiffness; thyroid- normal to palpation  RESP: lungs clear to auscultation - no rales, no rhonchi, no wheezes  CV: regular rates and rhythm, no murmur  MS: range of motion of bilateral shoulders remains quite compromised due to pain, bilateral shoulder tenderness  SKIN: no suspicious lesions, no rashes  NEURO: alert and oriented, normal gait, no ataxia, able to get up on exam table without assistance, CN 2-12 intact, no facial asymmetry, normal speech - no aphasia, normal strength and sensation in bilateral upper and lower extremities, normal DTR bilaterally; finger to nose and rapid alteration are normal; no pronator drift but she is unable to flex shoulders to 90 degrees; I do not some \"twitching\" of her left hand when she discusses her concern but when at rest I do not seen any abnormal movements    Mental " Status Assessment:  Appearance:   Appropriate   Eye Contact:   Good   Psychomotor Behavior: Normal   Attitude:   Cooperative   Orientation:   All  Speech   Rate / Production: Normal    Volume:  Normal   Mood:    Depressed   Affect:    Flat   Thought Content:  Clear   Thought Form:  Coherent  Logical   Insight:    Fair   Attention Span and Concentration:  adequate  Recent and Remote Memory:  intact  Fund of Knowledge: appropriate  Muscle Strength and Tone: normal     Suicidal Ideation: reports no thoughts, no intention    See [facility] notes     Diagnostic Test Results:  EKG is unchanged from baseline.  Orthostatics negative.     ASSESSMENT/PLAN:                                                      1. Fall, initial encounter  2. Syncope, unspecified syncope type  3. Takotsubo cardiomyopathy  Fall of unclear etiology.  Not mechanical.  Her neurological exam is normal so I do not feel the need for imaging of her head is warranted at this time.  It is unclear to me if this was truly syncopal.  My suspicion is that she was likely orthostatic as she had just gotten up from bed and her blood pressure have been running slightly lower more recently.  Will decrease losartan dose from 100mg to 50mg.  Given history of cardiomyopathy and no recent echo on file, I will obtain an echo.  EKG was unchanged from baseline.  If this continues to be a recurrent issue will need to obtain Holter monitor.    - CBC with platelets and differential  - Comprehensive metabolic panel (BMP + Alb, Alk Phos, ALT, AST, Total. Bili, TP)  - EKG 12-lead complete w/read - Clinics  - Echocardiogram Complete; Future    4. Insomnia, unspecified type  OK to change melatonin to q hs.    5. Tremor  I suspect this is more psychogenic than intention tremor because it seemed to only occur when she was discussing it and she did not experience any tremor on finger to nose testing or at rest.  Could consider dystonia?  Will check ceruloplasmin to rule out Maverick's  disease as possible etiology though suspicion is low.  Consider neuro evaluation if it persists.  Could also consider to be side effect of medications - consider trial of drug holidays in future if worsening/persistent and no other etiology identified.  - Ceruloplasmin    6. Acute pain of both shoulders  7. Positive AIDA (antinuclear antibody)  Discussed importance of seeing rheumatology.  She will call to schedule.      8. Type 2 diabetes mellitus with mild nonproliferative retinopathy without macular edema, with long-term current use of insulin, unspecified laterality (H)  Well controlled.  Encouraged adherence to insulin regimen.  No changes.      9. Anemia, unspecified type  At baseline.  Will check iron studies today.  She has had B12 and folate checked in past and they were normal.  Recent TSH was normal.   Pending these results we may need to consider additional testing including LDH, haptoglobin, retic count, and peripheral smear.  - CBC with platelets and differential  - Iron and iron binding capacity  - Ferritin    10. Schizoaffective disorder, depressive type (H)  Low mood persists.  No current or active SI.  We did not have time to address any medication changes today.  She will follow-up with Dr Pedersen on 2/18.    She will follow-up with me in 4 weeks, sooner if needed.  She expressed agreement and understanding with plan as above.      I spent Greater than 40 minutes was spent face to face with Nena Tang, with greater than 50 % in counselling and consultation about medication management and plan for ongoing management.      Rebecca Carr,   [facility]

## 2019-01-30 ENCOUNTER — OFFICE VISIT (OUTPATIENT)
Dept: BEHAVIORAL HEALTH | Facility: CLINIC | Age: 59
End: 2019-01-30
Payer: MEDICARE

## 2019-01-30 ENCOUNTER — OFFICE VISIT (OUTPATIENT)
Dept: FAMILY MEDICINE | Facility: CLINIC | Age: 59
End: 2019-01-30
Payer: MEDICARE

## 2019-01-30 ENCOUNTER — TELEPHONE (OUTPATIENT)
Dept: ENDOCRINOLOGY | Facility: CLINIC | Age: 59
End: 2019-01-30

## 2019-01-30 ENCOUNTER — OFFICE VISIT (OUTPATIENT)
Dept: PHARMACY | Facility: CLINIC | Age: 59
End: 2019-01-30
Payer: COMMERCIAL

## 2019-01-30 VITALS
BODY MASS INDEX: 47.02 KG/M2 | OXYGEN SATURATION: 99 % | WEIGHT: 239.5 LBS | SYSTOLIC BLOOD PRESSURE: 102 MMHG | RESPIRATION RATE: 16 BRPM | HEART RATE: 92 BPM | HEIGHT: 60 IN | DIASTOLIC BLOOD PRESSURE: 58 MMHG | TEMPERATURE: 98.2 F

## 2019-01-30 DIAGNOSIS — G89.29 CHRONIC PAIN OF BOTH SHOULDERS: Primary | ICD-10-CM

## 2019-01-30 DIAGNOSIS — M25.511 ACUTE PAIN OF BOTH SHOULDERS: ICD-10-CM

## 2019-01-30 DIAGNOSIS — F25.1 SCHIZOAFFECTIVE DISORDER, DEPRESSIVE TYPE (H): ICD-10-CM

## 2019-01-30 DIAGNOSIS — Z71.85 VACCINE COUNSELING: ICD-10-CM

## 2019-01-30 DIAGNOSIS — R63.5 WEIGHT GAIN: ICD-10-CM

## 2019-01-30 DIAGNOSIS — I51.81 TAKOTSUBO CARDIOMYOPATHY: ICD-10-CM

## 2019-01-30 DIAGNOSIS — I25.119 CORONARY ARTERY DISEASE INVOLVING NATIVE HEART WITH ANGINA PECTORIS, UNSPECIFIED VESSEL OR LESION TYPE (H): ICD-10-CM
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
## 2019-01-30 DIAGNOSIS — D64.9 ANEMIA, UNSPECIFIED TYPE: ICD-10-CM

## 2019-01-30 DIAGNOSIS — E11.65 TYPE 2 DIABETES MELLITUS WITH HYPERGLYCEMIA, WITH LONG-TERM CURRENT USE OF INSULIN (H): ICD-10-CM

## 2019-01-30 DIAGNOSIS — F43.10 POSTTRAUMATIC STRESS DISORDER: ICD-10-CM

## 2019-01-30 DIAGNOSIS — F25.1 SCHIZOAFFECTIVE DISORDER, DEPRESSIVE TYPE (H): Primary | ICD-10-CM

## 2019-01-30 DIAGNOSIS — R76.8 POSITIVE ANA (ANTINUCLEAR ANTIBODY): ICD-10-CM

## 2019-01-30 DIAGNOSIS — Z79.4 TYPE 2 DIABETES MELLITUS WITH MILD NONPROLIFERATIVE RETINOPATHY WITHOUT MACULAR EDEMA, WITH LONG-TERM CURRENT USE OF INSULIN, UNSPECIFIED LATERALITY (H): ICD-10-CM

## 2019-01-30 DIAGNOSIS — E11.3299 TYPE 2 DIABETES MELLITUS WITH MILD NONPROLIFERATIVE RETINOPATHY WITHOUT MACULAR EDEMA, WITH LONG-TERM CURRENT USE OF INSULIN, UNSPECIFIED LATERALITY (H): ICD-10-CM

## 2019-01-30 DIAGNOSIS — G47.00 INSOMNIA, UNSPECIFIED TYPE: ICD-10-CM

## 2019-01-30 DIAGNOSIS — M25.512 CHRONIC PAIN OF BOTH SHOULDERS: Primary | ICD-10-CM

## 2019-01-30 DIAGNOSIS — R25.1 TREMOR: ICD-10-CM

## 2019-01-30 DIAGNOSIS — E66.01 MORBID OBESITY (H): ICD-10-CM

## 2019-01-30 DIAGNOSIS — M25.511 CHRONIC PAIN OF BOTH SHOULDERS: Primary | ICD-10-CM

## 2019-01-30 DIAGNOSIS — I10 HTN, GOAL BELOW 140/90: ICD-10-CM

## 2019-01-30 DIAGNOSIS — Z79.4 TYPE 2 DIABETES MELLITUS WITH HYPERGLYCEMIA, WITH LONG-TERM CURRENT USE OF INSULIN (H): ICD-10-CM

## 2019-01-30 DIAGNOSIS — R29.6 FALLS FREQUENTLY: ICD-10-CM

## 2019-01-30 DIAGNOSIS — R55 SYNCOPE, UNSPECIFIED SYNCOPE TYPE: ICD-10-CM

## 2019-01-30 DIAGNOSIS — M25.512 ACUTE PAIN OF BOTH SHOULDERS: ICD-10-CM

## 2019-01-30 LAB
BASOPHILS # BLD AUTO: 0 10E9/L (ref 0–0.2)
BASOPHILS NFR BLD AUTO: 0.3 %
DIFFERENTIAL METHOD BLD: ABNORMAL
EOSINOPHIL # BLD AUTO: 0.1 10E9/L (ref 0–0.7)
EOSINOPHIL NFR BLD AUTO: 1.5 %
ERYTHROCYTE [DISTWIDTH] IN BLOOD BY AUTOMATED COUNT: 13.6 % (ref 10–15)
HCT VFR BLD AUTO: 32.9 % (ref 35–47)
HGB BLD-MCNC: 10.5 G/DL (ref 11.7–15.7)
LYMPHOCYTES # BLD AUTO: 2.3 10E9/L (ref 0.8–5.3)
LYMPHOCYTES NFR BLD AUTO: 33.6 %
MCH RBC QN AUTO: 30.9 PG (ref 26.5–33)
MCHC RBC AUTO-ENTMCNC: 31.9 G/DL (ref 31.5–36.5)
MCV RBC AUTO: 97 FL (ref 78–100)
MONOCYTES # BLD AUTO: 0.6 10E9/L (ref 0–1.3)
MONOCYTES NFR BLD AUTO: 8.9 %
NEUTROPHILS # BLD AUTO: 3.8 10E9/L (ref 1.6–8.3)
NEUTROPHILS NFR BLD AUTO: 55.7 %
PLATELET # BLD AUTO: 217 10E9/L (ref 150–450)
RBC # BLD AUTO: 3.4 10E12/L (ref 3.8–5.2)
WBC # BLD AUTO: 6.7 10E9/L (ref 4–11)

## 2019-01-30 PROCEDURE — 83550 IRON BINDING TEST: CPT | Performed by: FAMILY MEDICINE

## 2019-01-30 PROCEDURE — 90834 PSYTX W PT 45 MINUTES: CPT

## 2019-01-30 PROCEDURE — 82390 ASSAY OF CERULOPLASMIN: CPT | Performed by: FAMILY MEDICINE

## 2019-01-30 PROCEDURE — 85025 COMPLETE CBC W/AUTO DIFF WBC: CPT | Performed by: FAMILY MEDICINE

## 2019-01-30 PROCEDURE — 82728 ASSAY OF FERRITIN: CPT | Performed by: FAMILY MEDICINE

## 2019-01-30 PROCEDURE — 99607 MTMS BY PHARM ADDL 15 MIN: CPT | Performed by: PHARMACIST

## 2019-01-30 PROCEDURE — 99215 OFFICE O/P EST HI 40 MIN: CPT | Performed by: FAMILY MEDICINE

## 2019-01-30 PROCEDURE — 80053 COMPREHEN METABOLIC PANEL: CPT | Performed by: FAMILY MEDICINE

## 2019-01-30 PROCEDURE — 93000 ELECTROCARDIOGRAM COMPLETE: CPT | Performed by: FAMILY MEDICINE

## 2019-01-30 PROCEDURE — 99605 MTMS BY PHARM NP 15 MIN: CPT | Performed by: PHARMACIST

## 2019-01-30 PROCEDURE — 36415 COLL VENOUS BLD VENIPUNCTURE: CPT | Performed by: FAMILY MEDICINE

## 2019-01-30 PROCEDURE — 83540 ASSAY OF IRON: CPT | Performed by: FAMILY MEDICINE

## 2019-01-30 RX ORDER — TOPIRAMATE 25 MG/1
75 TABLET, FILM COATED ORAL 2 TIMES DAILY
Qty: 180 TABLET | Refills: 3 | Status: SHIPPED | OUTPATIENT
Start: 2019-01-30 | End: 2019-04-15

## 2019-01-30 RX ORDER — LOSARTAN POTASSIUM 50 MG/1
50 TABLET ORAL DAILY
Qty: 90 TABLET | Refills: 3 | Status: SHIPPED | OUTPATIENT
Start: 2019-01-30 | End: 2020-05-18

## 2019-01-30 ASSESSMENT — MIFFLIN-ST. JEOR: SCORE: 1592.86

## 2019-01-30 NOTE — TELEPHONE ENCOUNTER
Received call from the pharmacy asking for clarification of Topiramate dose. Prescribed on 1/14/19 Topiramate 75mg BID with quantity of 120 tabs. Pharmacist wanting 180 tabs dispensed for full 30 day supply. Gave verbal order.

## 2019-01-30 NOTE — PATIENT INSTRUCTIONS
Thank you for coming in today!      Here is a brief summary of the plan we discussed:  1.  You had a fall this morning.  We need to check a few things.  I have ordered some labs.  Your EKG was OK (I don't see any changes).  I would also like you to get an echo of your heart.  Your blood pressure is a bit low overall but did not drop when you stood up.  If this occurs again, please make sure to check your blood sugar and please let me know.    2.  Shoulder pain:  Take meloxicam at bedtime (not in the morning).  Please schedule an appointment with rheumatology - call [phone].  The rheumatologist will help us figure out the best way to treat your shoulder pain.  Try icing your shoulders for 10-15 minutes three times per day.    3.  OK to take melatonin at bedtime instead of dinner time.  4.  Decrease dose of losartan to 50mg daily.    5.  If you have any thoughts of hurting yourself in the future, please go to walk in center, ER, and/or call us here.      I look forward to seeing you back in clinic!

## 2019-01-30 NOTE — PROGRESS NOTES
[facility] - Integrated Primary Care   January 30, 2019      Behavioral Health Clinician Progress Note    Patient Name: Nena Tang           Service Type:  Individual      Service Location:   Face to Face in Clinic     Session Start Time: 10:45 am  Session End Time: 11:30 am      Session Length: 38 - 52      Attendees: Patient and MTM    Visit Activities (Refresh list every visit): [facility]    Diagnostic Assessment Date: 1-23-18  Treatment Plan Review Date: Not completed yet  See Flowsheets for today's PHQ-9 and TAMIA-7 results  Previous PHQ-9:   PHQ-9 SCORE 2/3/2017 3/13/2018 10/26/2018   PHQ-9 Total Score - - -   PHQ-9 Total Score - - -   PHQ-9 Total Score 0 14 20     Previous TAMIA-7:   TAMIA-7 SCORE 2/3/2017 3/13/2018 10/26/2018   Total Score - - -   Total Score 0 17 19   Total Score - - -       ALEXANDRA LEVEL:  ALEXANDRA Score (Last Two) 8/30/2011 6/9/2014   ALEXANDRA Raw Score 42 37   Activation Score 66 49.9   ALEXANDRA Level 3 2       DATA  Extended Session (60+ minutes): No  Interactive Complexity: No  Crisis: No  [facility] Patient: No    Treatment Objective(s) Addressed in This Session:  Target Behavior(s): disease management/lifestyle changes mental health    Depressed Mood: Increase interest, engagement, and pleasure in doing things  Decrease frequency and intensity of feeling down, depressed, hopeless  Improve quantity and quality of night time sleep / decrease daytime naps  Feel less tired and more energy during the day   Identify negative self-talk and behaviors: challenge core beliefs, myths, and actions  Improve concentration, focus, and mindfulness in daily activities   Decrease thoughts that you'd be better off dead or of suicide / self-harm  Anxiety: will experience a reduction in anxiety, will develop more effective coping skills to manage anxiety symptoms, will develop healthy cognitive patterns and beliefs and will increase ability to function adaptively  Alcohol / Substance Use: continue to make healthy choices  "regarding substance use and engage in activities / supportive services that promote sobriety  Thought Disturbance: will develop skills to more effectively manage symptoms, will develop more effective support systems and will continue to take medications as prescribed    Current Stressors / Issues:  [facility] MSW Intern met with patient at the request of the PCP to assess current behavioral health needs and provide information and support as necessary. Nena came into the clinic today for a routine follow up. Nena reports her shoulder pain remains constant and that she fell this morning while in the bathroom. Patient reports her sleep continues to be \"bad,\" averaging 6 hrs and trouble getting to sleep due to her shoulder pain. Patient reports she has not been using her CPAP machine at night due to her visual hallucination of \"the man using the machine to strangle her.\" Patient states since she started using the nightlight regularly \"the man\" has not been coming around at night and hopes to try using the CPAP soon. Patient reports that she has been feeling sad and worrying about her sister's health often. Patient shared a month ago she was experiencing command thoughts to hurt herself. Patient states she told the voice that she would not do that. Patient sought out help from the walk in center she goes to regularly. Patient shared she was mad at the counseling center for telling her  about her suicidal thoughts. Writer validated patients anger over her therapist breaking confidentiality while providing education around the three reasons why a therapist might break confidentiality. Writer and patient patient talked about what she will do if she experiences these thoughts again, call her best friend, call or go see her therapist, call her providers at the [facility], or call 911 or go to the emergency room. Patient denies current SI/SIB. Patient shares she is exited to celebrate her birthday with her sons this " weekend. Patient agrees to come back in 1 week to see her [facility] and in 4 weeks to see PCP and C.    Reviewed new and ongoing stressors  Reviewed mental health symptoms and appropriate coping mechanisms    I affirmed the steps this patient has taken to address physical and behavioral health issues, and offered continued behavioral health services or referral, now or in the future, as needed by the patient.    Progress on Treatment Objective(s) / Homework:  Minimal progress - PREPARATION (Decided to change - considering how); Intervened by negotiating a change plan and determining options / strategies for behavior change, identifying triggers, exploring social supports, and working towards setting a date to begin behavior change    Motivational Interviewing    MI Intervention: Expressed Empathy/Understanding, Supported Autonomy, Collaboration, Evocation, Permission to raise concern or advise, Open-ended questions, Reflections: simple and complex, Rolled with resistance: Emphasized patient autonomy, Simple reflection and Evoked patient agenda and Change talk (evoked)     Change Talk Expressed by the Patient: Desire to change Ability to change Reasons to change Need to change Committment to change Activation Taking steps    Provider Response to Change Talk: E - Evoked more info from patient about behavior change, A - Affirmed patient's thoughts, decisions, or attempts at behavior change, R - Reflected patient's change talk and S - Summarized patient's change talk statements      Care Plan review completed: No    Medication Review:  No changes to current psychiatric medication(s)     Medication Compliance:  NA    Changes in Health Issues:   None reported     Chemical Use Review:   Substance Use: Chemical use reviewed, no active concerns identified      Tobacco Use: No current tobacco use.      Assessment: Current Emotional / Mental Status (status of significant symptoms):  Risk status (Self / Other harm or suicidal  ideation)  Patient has had a history of suicidal ideation: yes  Patient denies current fears or concerns for personal safety.  Patient denies current or recent suicidal ideation or behaviors.  Patient denies current or recent homicidal ideation or behaviors.  Patient denies current or recent self injurious behavior or ideation.  Patient denies other safety concerns.  A safety and risk management plan has not been developed at this time, however patient was encouraged to call [place] [phone] should there be a change in any of these risk factors.    Appearance:   Appropriate   Eye Contact:   Fair   Psychomotor Behavior: Normal   Attitude:   Cooperative   Orientation:   All  Speech   Rate / Production: Normal    Volume:  Normal   Mood:    Depressed   Affect:    Flat   Thought Content:  Clear   Thought Form:  Coherent   Insight:    Fair     Diagnoses:  1. Schizoaffective disorder, depressive type (H)    2. Posttraumatic stress disorder        Collateral Reports Completed:  Not Applicable    Plan: (Homework, other):  Patient was given information about behavioral services and encouraged to schedule a follow up appointment with the clinic [facility] in 1 month.  She was also given information about mental health symptoms and treatment options .  CD Recommendations: Maintain Sobriety. Visit and Note completed by Lexie Gauthier [facility] MSW Clinical Intern. Supervised and Reviewed by:  BIN Reyes      ______________________________________________________________________

## 2019-01-30 NOTE — PROGRESS NOTES
"SUBJECTIVE/OBJECTIVE:                Nena Tang is a 58 year old female coming in for a follow-up visit for Medication Therapy Management.  She was referred to me from Rowena Haas. Seen as a covisit with Shell Carr DO .   This is a follow-up visit but the first visit of this calendar year.     Chief Complaint: Follow up from our visit on 11/1/18.  Shoulder pain and fall today.    Tobacco: No tobacco use  Alcohol: not currently using     Medication Adherence/Access:  no issues reported, set up and administered by group home staff, except when pt leaves group home (ex lunches at drop in center)    Fall: fell this morning after getting up from bed and walking into the bathroom, legs gave out and thinks she hit her head on the bathtub. She remembers falling but also saying she \"blacked out\". Able to pull herself up with the tub grab bars. No dizziness.     Shoulder pain:Taking meloxicam 15mg daily (thinks this slightly lessens pain, makes her a little tired. Unsure if she is taking in the morning or at night) and apap 1000mg 2-3 times a day (not effective). Pain in both shoulders, has worsened. Unable to put on her coat. Bothers her sleep, difficult to get comfortable.     Obesity: currently taking topiramate 75mg BID (recent increase by weight mgmt clinic) and Trulicity 1.5mg weekly.  Positive benefit with appetite reduction in topiramate. Finds herself not as hungry, at times wanting to skip meals altogether, eating smaller portion sizes.   Possible side effects: left hand twitching/shaking worse in the morning, sometimes will last all day. Causes her to drop things, hard to check blood sugar and give injection at times.     Schizoaffective: Currently taking sertraline 200mg daily, Invega 9mg daily, benztropine 1mg BID.   Mood has been down overall but recently more stable. Worried about her sister with terminal illness and shoulder pain. Reports an episode of strong suicidal thoughts about 1 month ago, " command auditory hallucination. She told therapist at [facility] and angry she told the staff at her group home.  Visual hallucinations have been manageable with keeping on nightlight but still has difficulty using CPAP without increased anxiety and having VH. See [facility] notes for additional details.   Side effects: possible related to left hand twitch/shaking as above. Continues to have some mouth/tongue movements but greatly improved with benztropine.     Diabetes:  Pt currently taking lantus 48u QHS, novolog 20u usually 2 times a day with meals and eating away from group home frequently at [facility] (frequently misses Novolog when leaving home for meals), and trulicity 1.5mg subcutaneously each week.     Pt is not experiencing side effects.   SMBG: 3 times daily. Ranges (glucose log)  Date FBG/ 2hours post Lunch/2hours post Dinner /2hours post    1/30 121      1/29 117 184 /112     142 217 /219     154 192 /142     109 112 /150     121 192 /136     143 221 /152       Patient is not experiencing hypoglycemia   Recent symptoms of high blood sugar? none  Eye exam: up to date  Foot exam: up to date  Microalbumin is < 30 mg/g. Pt is taking an ACEi/ARB.  Aspirin: Taking 81mg daily and denies side effects  Diet/Exercise:  Appetite is returning  Lab Results   Component Value Date    A1C 6.4 12/20/2018    A1C 6.4 08/06/2018    A1C 6.2 05/22/2018    A1C 6.8 02/12/2018    A1C 8.9 12/09/2017     vaccine: has complete Shingrix series.  Due for Td    Hypertension: Current medications include losartan 100mg daily, metoprolol XL 25mg daily.  Patient does not self-monitor BP.  Patient reports the following medication side effects: orthostatic lightheadedness? See above with recent fall.  BP Readings from Last 3 Encounters:   01/30/19 102/58   01/18/19 100/60   01/14/19 127/66        Today's Vitals: LMP 01/06/2015   BP Readings from Last 1 Encounters:   01/30/19 102/58     Pulse Readings from Last 1 Encounters:   01/30/19 92      Wt Readings from Last 1 Encounters:   01/30/19 239 lb 8 oz (108.6 kg)     Ht Readings from Last 1 Encounters:   01/30/19 5' (1.524 m)     Estimated body mass index is 46.77 kg/m  as calculated from the following:    Height as of an earlier encounter on 1/30/19: 5' (1.524 m).    Weight as of an earlier encounter on 1/30/19: 239 lb 8 oz (108.6 kg).    Temp Readings from Last 1 Encounters:   01/30/19 98.2  F (36.8  C) (Oral)        ASSESSMENT:              Current medications were reviewed today as discussed above.      Medication Adherence: fair with Novolog, excellent with all other medications.     Fall: likely 2/2 orthostatic hypotension. BP well below goal at last two visits, will adjust meds - see below.    Shoulder pain: needs improvement. She may benefit from moving meloxicam to bedtime to help with sleep. Provided phone number to schedule with rheumatology as recommended by PCP.    obesity: needs improvement. Finding benefit from topiramate. Unclear if hand twitching/tremor is caused by topiramate - will monitor.     schizoaffective: improved, safety plan reviewed with [facility].     Diabetes: improved, A1c at goal <7%. Continued to encouraged adherence to Novolog when leaving home. If needed, could consider V-go in the future if unable to control BS adequately.     Vaccine: due for Td, will defer to follow-up considering shoulder pain.     Hypertension: BP well below goal <140/90, will reduce losartan due to fall and possible orthostatic hypotension as above.     PLAN:                  1. Decrease losartan to 50mg daily  2. Move meloxicam to bedtime    I spent 50 minutes with this patient today. All changes were made via collaborative practice agreement with Rowena Haas. A copy of the visit note was provided to the patient's primary care provider.     Will follow up in 1-2 months.    The patient was given a summary of these recommendations as an after visit summary.    Eugenia De Jesus, PharmD, BCACP

## 2019-01-31 PROBLEM — R76.8 POSITIVE ANA (ANTINUCLEAR ANTIBODY): Status: ACTIVE | Noted: 2019-01-31

## 2019-01-31 LAB
ALBUMIN SERPL-MCNC: 3.9 G/DL (ref 3.4–5)
ALP SERPL-CCNC: 85 U/L (ref 40–150)
ALT SERPL W P-5'-P-CCNC: 20 U/L (ref 0–50)
ANION GAP SERPL CALCULATED.3IONS-SCNC: 6 MMOL/L (ref 3–14)
AST SERPL W P-5'-P-CCNC: 14 U/L (ref 0–45)
BILIRUB SERPL-MCNC: 0.2 MG/DL (ref 0.2–1.3)
BUN SERPL-MCNC: 29 MG/DL (ref 7–30)
CALCIUM SERPL-MCNC: 9.6 MG/DL (ref 8.5–10.1)
CHLORIDE SERPL-SCNC: 105 MMOL/L (ref 94–109)
CO2 SERPL-SCNC: 25 MMOL/L (ref 20–32)
CREAT SERPL-MCNC: 1.31 MG/DL (ref 0.52–1.04)
FERRITIN SERPL-MCNC: 180 NG/ML (ref 8–252)
GFR SERPL CREATININE-BSD FRML MDRD: 45 ML/MIN/{1.73_M2}
GLUCOSE SERPL-MCNC: 129 MG/DL (ref 70–99)
IRON SATN MFR SERPL: 16 % (ref 15–46)
IRON SERPL-MCNC: 52 UG/DL (ref 35–180)
POTASSIUM SERPL-SCNC: 4.7 MMOL/L (ref 3.4–5.3)
PROT SERPL-MCNC: 7.9 G/DL (ref 6.8–8.8)
SODIUM SERPL-SCNC: 136 MMOL/L (ref 133–144)
TIBC SERPL-MCNC: 334 UG/DL (ref 240–430)

## 2019-02-01 ENCOUNTER — TELEPHONE (OUTPATIENT)
Dept: RHEUMATOLOGY | Facility: CLINIC | Age: 59
End: 2019-02-01

## 2019-02-01 NOTE — TELEPHONE ENCOUNTER
2/1/2019  11:30 AM        RN Care Coordinator Encounter   Attempted to connect with patient for follow up on Rheumatology referral intake process form, no answer unable to leave message. Will send patient a MyChart message to be reviewed and answered to help with the intake process. Will follow up with patient again in 1-2 weeks if no call back or if MyChart not reviewed.          Blanka Linares RN, BSN, PHN  [facility]  RN Care Coordinator Medicine Specialty Pool Nurse   (Nephrology, Rheumatology, Infectious Disease & Hepatology)

## 2019-02-04 ENCOUNTER — TELEPHONE (OUTPATIENT)
Dept: ENDOCRINOLOGY | Facility: CLINIC | Age: 59
End: 2019-02-04

## 2019-02-04 LAB — CERULOPLASMIN SERPL-MCNC: 22 MG/DL (ref 23–53)

## 2019-02-06 NOTE — TELEPHONE ENCOUNTER
PA Initiation    Medication: topiramate (TOPAMAX) 25 MG tablet -   Insurance Company: [employer] - Phone [phone] Fax [phone]  Pharmacy Filling the Rx: [facility] [id number] - [place], [place] - [address]  Filling Pharmacy Phone: [phone]  Filling Pharmacy Fax: [phone]  Start Date: 2/6/2019

## 2019-02-08 ENCOUNTER — OFFICE VISIT (OUTPATIENT)
Dept: BEHAVIORAL HEALTH | Facility: CLINIC | Age: 59
End: 2019-02-08
Payer: MEDICARE

## 2019-02-08 ENCOUNTER — OFFICE VISIT (OUTPATIENT)
Dept: FAMILY MEDICINE | Facility: CLINIC | Age: 59
End: 2019-02-08
Payer: MEDICARE

## 2019-02-08 VITALS
RESPIRATION RATE: 16 BRPM | SYSTOLIC BLOOD PRESSURE: 128 MMHG | DIASTOLIC BLOOD PRESSURE: 72 MMHG | BODY MASS INDEX: 46.87 KG/M2 | WEIGHT: 240 LBS | HEART RATE: 90 BPM | OXYGEN SATURATION: 96 % | TEMPERATURE: 99 F

## 2019-02-08 DIAGNOSIS — R76.8 POSITIVE ANA (ANTINUCLEAR ANTIBODY): ICD-10-CM

## 2019-02-08 DIAGNOSIS — M25.512 ACUTE PAIN OF BOTH SHOULDERS: Primary | ICD-10-CM

## 2019-02-08 DIAGNOSIS — Z53.9 ERRONEOUS ENCOUNTER--DISREGARD: Primary | ICD-10-CM

## 2019-02-08 DIAGNOSIS — F25.1 SCHIZOAFFECTIVE DISORDER, DEPRESSIVE TYPE (H): ICD-10-CM

## 2019-02-08 DIAGNOSIS — F25.1 SCHIZOAFFECTIVE DISORDER, DEPRESSIVE TYPE (H): Primary | ICD-10-CM

## 2019-02-08 DIAGNOSIS — M25.511 ACUTE PAIN OF BOTH SHOULDERS: Primary | ICD-10-CM

## 2019-02-08 DIAGNOSIS — F43.10 POSTTRAUMATIC STRESS DISORDER: ICD-10-CM

## 2019-02-08 PROCEDURE — 90834 PSYTX W PT 45 MINUTES: CPT | Performed by: SOCIAL WORKER

## 2019-02-08 PROCEDURE — 99214 OFFICE O/P EST MOD 30 MIN: CPT | Performed by: NURSE PRACTITIONER

## 2019-02-08 NOTE — PATIENT INSTRUCTIONS
We will try a topical cream for your shoulder pain    Please do not take any oral NSAIDs we will call your [facility] and let them know    We will work on getting you an appt today for rheumatology, we have referred you to the [facility] [place] [facility] or [facility] rheumatology dept.

## 2019-02-08 NOTE — PROGRESS NOTES
LVM with  staff regarding use of NSAIDs-- informed not to give any and that volatren gel has been ordered to help with pain. Also informed that we are working on a new referral for a different rheumatology clinic and will call with appt day/time when we know.     Corine Cunha RN  02/08/19  12:33 PM

## 2019-02-08 NOTE — PROGRESS NOTES
[facility] - Integrated Primary Care   February 8, 2019    Behavioral Health Clinician Progress Note    Patient Name: Nena Tang           Service Type:  Individual      Service Location:   Face to Face in Clinic     Session Start Time: 11:30am Session End Time: 12:10pm      Session Length: 38 - 52      Attendees: Patient    Visit Activities (Refresh list every visit): [facility]    Diagnostic Assessment Date: 1-23-18  Treatment Plan Review Date: Not completed yet  See Flowsheets for today's PHQ-9 and TAMIA-7 results  Previous PHQ-9:   PHQ-9 SCORE 2/3/2017 3/13/2018 10/26/2018   PHQ-9 Total Score - - -   PHQ-9 Total Score - - -   PHQ-9 Total Score 0 14 20     Previous TAMIA-7:   TAMIA-7 SCORE 2/3/2017 3/13/2018 10/26/2018   Total Score - - -   Total Score 0 17 19   Total Score - - -       ALEXANDRA LEVEL:  ALEXANDRA Score (Last Two) 8/30/2011 6/9/2014   ALEXANDRA Raw Score 42 37   Activation Score 66 49.9   ALEXANDRA Level 3 2       DATA  Extended Session (60+ minutes): No  Interactive Complexity: No  Crisis: No  [facility] Patient: No    Treatment Objective(s) Addressed in This Session:  Target Behavior(s): disease management/lifestyle changes mental health    Depressed Mood: Increase interest, engagement, and pleasure in doing things  Decrease frequency and intensity of feeling down, depressed, hopeless  Improve quantity and quality of night time sleep / decrease daytime naps  Feel less tired and more energy during the day   Identify negative self-talk and behaviors: challenge core beliefs, myths, and actions  Improve concentration, focus, and mindfulness in daily activities   Decrease thoughts that you'd be better off dead or of suicide / self-harm  Anxiety: will experience a reduction in anxiety, will develop more effective coping skills to manage anxiety symptoms, will develop healthy cognitive patterns and beliefs and will increase ability to function adaptively  Alcohol / Substance Use: continue to make healthy choices regarding  substance use and engage in activities / supportive services that promote sobriety  Thought Disturbance: will develop skills to more effectively manage symptoms, will develop more effective support systems and will continue to take medications as prescribed    Current Stressors / Issues:    The patient worked with the PCP with scheduling her specialist appointment to address current medical issues and this lead to the PCP accessing the contact information to speak with on call specialist to see if she can get the appointment set for a sooner time and she is going to explore some alternative providers to get the patient seen sooner-the patient continues to live at her group home-regarding sleeping the patient reported having difficulty with sleeping-she is dealing with pain issues and she talked about being in so much pain that (I was about to do some crazy stuff)-she has a therapist at the [facility] and she had a break in her visits due to life circumstances and she was able to schedule an appointment with her outpatient therapist-she has been struggling and coping with prayer and she has been thinking about life and wanting to do life right-she stated that she had a birthday recently-she stated that her son took her out for lunch and her group home took her out for dinner and she spent time with her friend (friend that recently was released from shelter)-she has hopes and dreams of (I don't know right now)-hope for today is to get an appointment scheduled today to address her shoulder pain and she is looking forward to getting some medical help with her pain issues-she stated that she having fun at the [facility]-they play cards and pool and they go to group and having cooking and she has met good people at [facility]-she goes to the [facility] every week day and sometimes on Saturday-she stated that her kids are doing more for her and showing that they need her and accepting her more as their mother  and calling me more-she is planing to watch her grandchild tonight while her son goes out for the night-she stated that about one month ago she had suicidal thoughts-she stated that more recently she was feeling down (alittle bit)-she was able to access the appointment-February 15 is going to be a sad day for me-(death of her )-He is not here but I still celebrate his birthday-she is planing to let his ashes go when it gets spring time and her mother's ashes-her sister is dealing with major medical condition and they talk on phone-    Progress on Treatment Objective(s) / Homework:  Minimal progress - ACTION (Actively working towards change); Intervened by reinforcing change plan / affirming steps taken    Motivational Interviewing    MI Intervention: Expressed Empathy/Understanding, Supported Autonomy, Collaboration, Evocation, Permission to raise concern or advise, Open-ended questions, Reflections: simple and complex, Rolled with resistance: Emphasized patient autonomy, Simple reflection and Evoked patient agenda and Change talk (evoked)     Change Talk Expressed by the Patient: Desire to change Ability to change Reasons to change Need to change Committment to change Activation Taking steps    Provider Response to Change Talk: E - Evoked more info from patient about behavior change, A - Affirmed patient's thoughts, decisions, or attempts at behavior change, R - Reflected patient's change talk and S - Summarized patient's change talk statements      Care Plan review completed: No    Medication Review:  No changes to current psychiatric medication(s)     Medication Compliance:  NA    Changes in Health Issues:   None reported     Chemical Use Review:   Substance Use: Chemical use reviewed, no active concerns identified      Tobacco Use: No current tobacco use.      Assessment: Current Emotional / Mental Status (status of significant symptoms):  Risk status (Self / Other harm or suicidal ideation)  Patient has  had a history of suicidal ideation: yes  Patient denies current fears or concerns for personal safety.  Patient denies current or recent suicidal ideation or behaviors.  Patient denies current or recent homicidal ideation or behaviors.  Patient denies current or recent self injurious behavior or ideation.  Patient denies other safety concerns.  A safety and risk management plan has not been developed at this time, however patient was encouraged to call [place] [phone] should there be a change in any of these risk factors.    Appearance:   Appropriate   Eye Contact:   Good   Psychomotor Behavior: Normal   Attitude:   Cooperative   Orientation:   All  Speech   Rate / Production: Normal    Volume:  Normal   Mood:    Normal  Affect:    Appropriate  Bright  Worrisome   Thought Content:  Clear   Thought Form:  Coherent   Insight:    Good     Diagnoses:  1. Schizoaffective disorder, depressive type (H)    2. Posttraumatic stress disorder        Collateral Reports Completed:  Not Applicable    Plan: (Homework, other):  Patient was given information about behavioral services and encouraged to schedule a follow up appointment with the clinic [facility] as needed.  She was also given information about mental health symptoms and treatment options .  CD Recommendations: Maintain Sobriety.    BIN George, [facility]      ______________________________________________________________________

## 2019-02-08 NOTE — PROGRESS NOTES
SUBJECTIVE:                                                    Nena Tang is a 59 year old female who presents to clinic today for the following health issues:  [facility]: Don    Appointment    Musculoskeletal problem/pain      Duration: 1 1/2 months    Description  Location:  Bilateral shoulder pain with elevated ESR and +AIDA:   Took ibuprofen yesterday 1x and scheduled tylenol for pain in shoulders.  This is somewhat helpful but pain persists.  She does not have an appointment scheduled with rheumatology.  we discussed completing her intake form here today and trying to get her scheduled      Intensity:  10/10    Accompanying signs and symptoms: radiation of pain to right and left arms, numbness and weakness, struggling with ADL's, unable to raise arms over her head     History  Previous similar problem: no   Previous evaluation:  none    Precipitating or alleviating factors:  Trauma or overuse: no   Aggravating factors include: none it is just there    Therapies tried and outcome: pain medication      Mental Health Follow up/PTSD/Schizoaffective-depressive    Status since last visit: worse due to chronic pain     See PHQ-9 for current symptoms.  Other associated symptoms: None    Complicating factors: limited ROM in shoulders due to pain   Significant life event: New shoulder pain    Current substance abuse:  None  Anxiety or Panic symptoms:  No    Sleep - poor  Appetite - ok  Exercise - none    Smoking - no  Alcohol - no  Street drugs - no  Marijuana - no  Caffeine - occ.     PHQ-9  English PHQ-9   Any Language               Problems taking medications regularly: No    Medication side effects: none    Diet: regular (no restrictions)    Social History     Tobacco Use     Smoking status: Never Smoker     Smokeless tobacco: Never Used   Substance Use Topics     Alcohol use: No     Comment: last month        Problem list and histories reviewed & adjusted, as indicated.  Additional history: as documented    Patient  Active Problem List   Diagnosis     Osteopenia     Vitamin B12 deficiency without anemia     Hyperlipidemia LDL goal <100     Rotator cuff syndrome     Type 2 diabetes mellitus with mild nonproliferative retinopathy (H)     Illiterate     Irritable bowel syndrome     overweight - BMI >35     Takotsubo cardiomyopathy     CAD (coronary artery disease)     Restless legs syndrome (RLS)     CINDY (obstructive sleep apnea)- mild AHI 10.3     Verbal auditory hallucination     Chronic low back pain     Schizoaffective disorder, depressive type (H)     Migraine headache     HTN, goal below 140/90     Health Care Home     Lumbago     Cervicalgia     Cocaine abuse, episodic (H)     Suicidal ideation     Esophageal reflux     Mild nonproliferative diabetic retinopathy (H)     Tardive dyskinesia     Alcohol use     Left cataract     Falls frequently     History of uterine cancer     Psychophysiological insomnia     Dysuria     Asymptomatic postmenopausal status     Abdominal pain, right lower quadrant     Infectious encephalopathy     Non-intractable vomiting with nausea     Thoughts of self harm     Gastroenteritis     Posttraumatic stress disorder     Cervical cancer screening     Type 2 diabetes mellitus with stage 3 chronic kidney disease, with long-term current use of insulin (H)     Positive AIDA (antinuclear antibody)     Past Surgical History:   Procedure Laterality Date     C OOPHORECTORMY FOR MALIG, W/BX  1983    UTERINE     CATARACT IOL, RT/LT Bilateral 2017     COLONOSCOPY N/A 3/16/2017    Procedure: COLONOSCOPY;  Surgeon: Traci Gonzalez MD;  Location: [facility]     Coronary CTA  5/21/2014     HYSTERECTOMY  1983    uterine cancer yearly pap's per provider.     LAPAROSCOPIC CHOLECYSTECTOMY  2008     PHACOEMULSIFICATION CLEAR CORNEA WITH STANDARD INTRAOCULAR LENS IMPLANT Left 5/5/2017    Procedure: PHACOEMULSIFICATION CLEAR CORNEA WITH STANDARD INTRAOCULAR LENS IMPLANT;  LEFT EYE PHACOEMULSIFICATION CLEAR CORNEA  WITH STANDARD INTRAOCULAR LENS IMPLANT ;  Surgeon: Tyra Diaz MD;  Location:  [facility]     PHACOEMULSIFICATION CLEAR CORNEA WITH STANDARD INTRAOCULAR LENS IMPLANT Right 2017    Procedure: PHACOEMULSIFICATION CLEAR CORNEA WITH STANDARD INTRAOCULAR LENS IMPLANT;  RIGHT EYE PHACOEMULSIFICATION CLEAR CORNEA WITH STANDARD INTRAOCULAR LENS IMPLANT;  Surgeon: Tyra Diaz MD;  Location:  [facility]     RELEASE TRIGGER FINGER  10/11/2012    Left thumb. Procedure: RELEASE TRIGGER FINGER;  LEFT THUMB TRIGGER RELEASE;  Surgeon: Tay Langley MD;  Location:  [facility]     RELEASE TRIGGER FINGER Right 2016    Procedure: RELEASE TRIGGER FINGER;  Surgeon: Albino Castañeda MD;  Location: [facility]       Social History     Tobacco Use     Smoking status: Never Smoker     Smokeless tobacco: Never Used   Substance Use Topics     Alcohol use: No     Comment: last month     Family History   Problem Relation Age of Onset     Cancer Mother         BLADDER     Respiratory Mother         COPD     Gastrointestinal Disease Mother         CIRRHOSIS OF LI BOLIVAR     Alcohol/Drug Mother      Diabetes Mother      Hypertension Mother      Lipids Mother      C.A.D. Mother      Glaucoma Mother      Alcohol/Drug Sister      Mental Illness Sister      Alcohol/Drug Sister      Psychotic Disorder Sister      Cancer Maternal Grandmother         UNKNOWN TYPE     Cancer Brother         COLON     Cancer - colorectal Brother         IN HIS LATE 30S     Alcohol/Drug Brother          OF HEROIN OVERDOSE AT AGE 22 YRS     Macular Degeneration No family hx of            Current Outpatient Medications   Medication Sig Dispense Refill     acetaminophen (TYLENOL) 500 MG tablet Take 2 tablets (1,000 mg) by mouth 3 times daily as needed for mild pain 100 tablet 3     albuterol (PROAIR HFA/PROVENTIL HFA/VENTOLIN HFA) 108 (90 Base) MCG/ACT Inhaler Inhale 2 puffs into the lungs every 6 hours as needed for shortness of breath / dyspnea or wheezing 1 Inhaler 0      aspirin 81 MG EC tablet TAKE 1 TABLET (81MG) BY MOUTH DAILY 28 tablet 3     atorvastatin (LIPITOR) 80 MG tablet TAKE 1 TABLET (80MG) BY MOUTH DAILY 30 tablet 11     benztropine (COGENTIN) 0.5 MG tablet Take 2 tablets (1 mg) by mouth 2 times daily 120 tablet 2     calcium carbonate (OS-ALLEN 600 MG [place]. CA) 1500 (600 CA) MG tablet Take 1 tablet (1,500 mg) by mouth daily 180 tablet 3     dulaglutide (TRULICITY) 1.5 MG/0.5ML pen Inject 1.5 mg Subcutaneous every 7 days 2 mL 3     fluticasone (FLONASE) 50 MCG/ACT spray Spray 1-2 sprays into both nostrils daily 1 Bottle 11     gabapentin (NEURONTIN) 300 MG capsule TAKE 2 CAPSULES (600MG) BY MOUTH THREE TIMES A  capsule 1     glucose 4 G CHEW chewable tablet Take 2 every 15 minutes for blood sugar <70mg/dL. Recheck blood sugar every 15 minutes until above 70mg/dL, then eat a substantial meal. 20 tablet 1     guaiFENesin (ROBITUSSIN) 20 mg/mL SOLN solution Take 10 mLs by mouth every 6 hours as needed for cough 236 mL 0     insulin aspart (NOVOLOG FLEXPEN) 100 UNIT/ML pen Inject 20 Units Subcutaneous 3 times daily (with meals) Once daily, can add additional 5 units if BGs are >500mg/dL. 15 mL 11     insulin glargine (LANTUS SOLOSTAR) 100 UNIT/ML pen Inject 48 Units Subcutaneous At Bedtime 3 mL 11     insulin pen needle (NOVOFINE 30) 30G X 8 MM USE 4 DAILY OR AS DIRECTED 400 each 0     ipratropium - albuterol 0.5 mg/2.5 mg/3 mL (DUONEB) 0.5-2.5 (3) MG/3ML neb solution Take 1 vial (3 mLs) by nebulization every 6 hours as needed for shortness of breath / dyspnea or wheezing 30 vial 0     losartan (COZAAR) 50 MG tablet Take 1 tablet (50 mg) by mouth daily 90 tablet 3     melatonin 5 MG CAPS Take 2 capsules by mouth At Bedtime 180 capsule 3     meloxicam (MOBIC) 15 MG tablet Take 1 tablet (15 mg) by mouth daily 90 tablet 3     metoprolol succinate (TOPROL-XL) 25 MG 24 hr tablet Take 1 tablet (25 mg) by mouth daily 90 tablet 1     paliperidone ER (INVEGA) 9 MG 24 hr  tablet Take 1 tablet (9 mg) by mouth every morning 30 tablet 2     polyethylene glycol (MIRALAX/GLYCOLAX) powder TAKE 8.5 GRAMS (1/2 CAPFUL) BY MOUTH DAILY 527 g 1     ranitidine (ZANTAC) 300 MG tablet TAKE 1 TABLET (300MG) BY MOUTH AT BEDTIME 90 tablet 1     senna-docusate (SENOKOT-S;PERICOLACE) 8.6-50 MG per tablet Take 1 tablet by mouth 2 times daily as needed for constipation 100 tablet 1     sertraline (ZOLOFT) 100 MG tablet Take 2 tablets (200 mg) by mouth daily 60 tablet 2     spacer (OPTICHAMBER PATRICIA) holding chamber Holding/spacer device to use with inhaler. 1 each 0     SUMAtriptan (IMITREX) 25 MG tablet Take 1-2 tablets (25-50 mg) by mouth at onset of headache for migraine May repeat in 2 hours. Max 8 tablets/24 hours. 12 tablet 1     topiramate (TOPAMAX) 25 MG tablet Take 3 tablets (75 mg) by mouth 2 times daily 180 tablet 3     vitamin D3 (CHOLECALCIFEROL) 46863 UNITS capsule TAKE 1 CAPSULE (50,000 UNITS) MONTHLY 12 capsule 1     Allergies   Allergen Reactions     Imidazole Antifungals Hives     Tolerates diflucan     Ketoprofen Itching     Pruritis to topical     Lisinopril Hives     Metformin Other (See Comments)     Patient hospitalized for lactic acidosis - admitting provider suspectd caused by metformin     Metronidazole Hives     Posaconazole Hives     Tolerates diflucan     Recent Labs   Lab Test 01/30/19  1215 12/20/18  1532  10/17/18  1531 08/06/18  1038  05/28/18  1625 05/22/18  1434  11/07/17  0801  06/27/17  1358  07/13/16  1350  07/14/15  1215   A1C  --  6.4*  --   --  6.4*  --   --  6.2*   < > 8.9*   < > 7.0*   < >  --    < > 9.1*   LDL  --   --   --   --  84  --   --   --   --   --   --  64  --  137*  --  78   HDL  --   --   --   --  46*  --   --   --   --   --   --  37*  --   --   --  39*   TRIG  --   --   --   --  215*  --   --   --   --   --   --  267*  --   --   --  151*   ALT 20  --   --  23  --   --  27  --    < > 23   < > 32   < >  --    < >  --    CR 1.31* 0.99   < > 1.04  --     < > 1.16*  --    < > 0.88   < > 0.78   < >  --    < > 0.67   GFRESTIMATED 45* 63   < > 54*  --    < > 48*  --    < > 66   < > 76   < >  --    < > >90  Non  GFR Calc     GFRESTBLACK 52* 73   < > 66  --    < > 58*  --    < > 80   < > >90   GFR Calc     < >  --    < > >90   GFR Calc     POTASSIUM 4.7 4.2   < > 4.3  --    < > 4.5  --    < > 3.9   < > 4.3   < >  --    < > 4.3   TSH  --  1.98  --   --   --   --   --   --   --  3.21  --   --    < >  --    < >  --     < > = values in this interval not displayed.      BP Readings from Last 3 Encounters:   01/30/19 102/58   01/18/19 100/60   01/14/19 127/66    Wt Readings from Last 3 Encounters:   01/30/19 108.6 kg (239 lb 8 oz)   01/18/19 109.8 kg (242 lb)   01/14/19 108.3 kg (238 lb 11.2 oz)        Labs reviewed in EPIC  Problem list, Medication list, Allergies, and Medical/Social/Surgical histories reviewed in [facility] and updated as appropriate.     ROS: Constitutional, neuro, ENT, endocrine, pulmonary, cardiac, gastrointestinal, genitourinary, musculoskeletal, integument and psychiatric systems are negative, except as otherwise noted above in the HPI.    OBJECTIVE:                                                    /72   Pulse 90   Temp 99  F (37.2  C) (Oral)   Resp 16   Wt 108.9 kg (240 lb)   LMP 01/06/2015   SpO2 96%   BMI 46.87 kg/m    There is no height or weight on file to calculate BMI.  GENERAL: alert, well nourished, well hydrated, severe distress  EYES: Eyes grossly normal to inspection, extraocular movements - intact, and PERRL  RESP: lungs clear to auscultation - no rales, no rhonchi, no wheezes  CV: regular rates and rhythm, normal S1 S2, no S3 or S4 and no murmur,   MS: extremities- no gross deformities noted, no edema, limited ROM in both shoulders, pain with palpation, struggles to lift arms   SKIN: no suspicious lesions, no rashes  NEURO: strength and tone- baseline, sensory exam- grossly normal,  mentation- cognition impairment, speech- normal,   Mental Status Assessment:  Appearance:   Appropriate   Eye Contact:   Poor  Psychomotor Behavior: Normal  Restless   Attitude:   Cooperative  Guarded   Orientation:   All  Speech   Rate / Production: Monotone  Slow    Volume:  Soft   Mood:    Anxious  Irritable   Affect:    Labile   Thought Content:  Rumination   Thought Form:  Goal Directed   Insight:    Poor   Attention Span and Concentration:  limited  Recent and Remote Memory:  intact  Fund of Knowledge: appropriate  Muscle Strength and Tone: normal   Suicidal Ideation: reports no thoughts, no intention  Hallucination: NoParanoid-Yes  Manic-No  Panic-No  Self harm-no but hx of       See [facility] notes Richardson Lopez    Diagnostic Test Results:  Results for orders placed or performed in visit on 01/30/19   CBC with platelets and differential   Result Value Ref Range    WBC 6.7 4.0 - 11.0 10e9/L    RBC Count 3.40 (L) 3.8 - 5.2 10e12/L    Hemoglobin 10.5 (L) 11.7 - 15.7 g/dL    Hematocrit 32.9 (L) 35.0 - 47.0 %    MCV 97 78 - 100 fl    MCH 30.9 26.5 - 33.0 pg    MCHC 31.9 31.5 - 36.5 g/dL    RDW 13.6 10.0 - 15.0 %    Platelet Count 217 150 - 450 10e9/L    % Neutrophils 55.7 %    % Lymphocytes 33.6 %    % Monocytes 8.9 %    % Eosinophils 1.5 %    % Basophils 0.3 %    Absolute Neutrophil 3.8 1.6 - 8.3 10e9/L    Absolute Lymphocytes 2.3 0.8 - 5.3 10e9/L    Absolute Monocytes 0.6 0.0 - 1.3 10e9/L    Absolute Eosinophils 0.1 0.0 - 0.7 10e9/L    Absolute Basophils 0.0 0.0 - 0.2 10e9/L    Diff Method Automated Method    Iron and iron binding capacity   Result Value Ref Range    Iron 52 35 - 180 ug/dL    Iron Binding Cap 334 240 - 430 ug/dL    Iron Saturation Index 16 15 - 46 %   Ferritin   Result Value Ref Range    Ferritin 180 8 - 252 ng/mL   Comprehensive metabolic panel (BMP + Alb, Alk Phos, ALT, AST, Total. Bili, TP)   Result Value Ref Range    Sodium 136 133 - 144 mmol/L    Potassium 4.7 3.4 - 5.3 mmol/L    Chloride 105 94 -  109 mmol/L    Carbon Dioxide 25 20 - 32 mmol/L    Anion Gap 6 3 - 14 mmol/L    Glucose 129 (H) 70 - 99 mg/dL    Urea Nitrogen 29 7 - 30 mg/dL    Creatinine 1.31 (H) 0.52 - 1.04 mg/dL    GFR Estimate 45 (L) >60 mL/min/[1.73_m2]    GFR Estimate If Black 52 (L) >60 mL/min/[1.73_m2]    Calcium 9.6 8.5 - 10.1 mg/dL    Bilirubin Total 0.2 0.2 - 1.3 mg/dL    Albumin 3.9 3.4 - 5.0 g/dL    Protein Total 7.9 6.8 - 8.8 g/dL    Alkaline Phosphatase 85 40 - 150 U/L    ALT 20 0 - 50 U/L    AST 14 0 - 45 U/L   Ceruloplasmin   Result Value Ref Range    Ceruloplasmin 22 (L) 23 - 53 mg/dL     *Note: Due to a large number of results and/or encounters for the requested time period, some results have not been displayed. A complete set of results can be found in Results Review.        ASSESSMENT/PLAN:                                                    ASSESSMENT / PLAN:  (M25.511,  M25.512) Acute pain of both shoulders  (primary encounter diagnosis)  Comment: Pain continues, taking acetaminophen and occ Ibuprofen, unable to schedule Rhematology   Plan: diclofenac (VOLTAREN) 1 % topical gel,         RHEUMATOLOGY REFERRAL        Kidney function decline, discussed no NSAIDs for pain ok to take acetaminophen 3x daily up to 3,000mg, ordered Voltren cream, unable to get into Rheumatology at the [facility] for 3 months, new referral placed to go to [place] or [place] wherever she can get in sooner      (R76.8) Positive AIDA (antinuclear antibody)  Comment: pain still severe   Plan: RHEUMATOLOGY REFERRAL        New referral placed, see above    (F25.1) Schizoaffective disorder, depressive type (H)  Comment: worse due to pain,   Plan: working on addressing pain, see [facility] note    ART Lopez [facility] PRIMARY [facility]

## 2019-02-08 NOTE — TELEPHONE ENCOUNTER
Received call from ART Onofre, at the [facility]. They are wanting to get this pt scheduled into rheumatology as pt has bilateral shoulder pain, + ANA1:160 on 1/18/19 and 1:640 on 12/20/18 and + ESR 42.    Impression on the Imaging on 1/18/19 of the shoulders:  Impression:  1. No acute osseous abnormality.  2. Moderate right acromioclavicular joint osteoarthrosis.  3. Diffuse prominent interstitial markings and indistinct pulmonary  vessels, may be related to interstitial pulmonary edema.    Informed Jemima that we are booking out a couple of months and was told that they can[t wait that long as they can[t give this pt pain meds due to her kidney function. Told provider that if she feels pt needs to be seen on an emergent basis she should page the on call rheumatologist. Also discussed that [place] does have a couple of rheumatologists that are options for them. She will think about their options.    Reviewed pt's upcoming appointments and see that pt is scheduled to see Dr Kwong on 3/14/19. We will not continue with the referral here.    CYNTHIA WootenN RN  Rheumatology RN Coordinator  [facility]

## 2019-02-08 NOTE — Clinical Note
Please call the [facility] and let them know that she can not take any NSAIDs at this point due to a decline in her kidney function

## 2019-02-09 NOTE — TELEPHONE ENCOUNTER
PRIOR AUTHORIZATION DENIED    Medication: topiramate (TOPAMAX) 25 MG tablet - DENIED    Denial Date: 2/6/2019    Denial Rational:         Appeal Information:

## 2019-02-11 NOTE — TELEPHONE ENCOUNTER
Called patient to inform her that PA for Topiramate was denied by her insurance. Advised patient that she can  prescription and pay out of pocket if she would like.

## 2019-02-12 DIAGNOSIS — I25.119 CORONARY ARTERY DISEASE INVOLVING NATIVE HEART WITH ANGINA PECTORIS, UNSPECIFIED VESSEL OR LESION TYPE (H): ICD-10-CM

## 2019-02-12 NOTE — TELEPHONE ENCOUNTER
"Requested Prescriptions   Pending Prescriptions Disp Refills     aspirin (ASA) 81 MG EC tablet Last Written Prescription Date:  10/23/18  Last Fill Quantity: 28,  # refills: 3   Last office visit: 2/8/2019 with prescribing provider:     Future Office Visit:   Next 5 appointments (look out 90 days)    Feb 18, 2019 11:00 AM CST  Return Visit with Kraig Pedersen MD  [facility] ([facility]) [address]  [facility] [address]  [phone]   Feb 22, 2019  9:30 AM CST  Return Visit with Richardson Lopez, [facility] ([facility]) [address]  [address]  [facility] [address]  [phone]   Feb 27, 2019 11:30 AM CST  Return Visit with Rebecca Carr DO  [facility] ([facility]) [address]  [address]  [facility] [address]  [phone]   Feb 27, 2019 11:30 AM CST  Return Visit with Richardson Lopez, [facility] ([facility]) [address]  [address]  [facility] [address]  [phone]          28 tablet 3     Sig: TAKE 1 TABLET (81MG) BY MOUTH DAILY    Analgesics (Non-Narcotic Tylenol and ASA Only) Passed - 2/12/2019  1:56 PM       Passed - Recent (12 mo) or future (30 days) visit within the authorizing provider's specialty    Patient had office visit in the last 12 months or has a visit in the next 30 days with authorizing provider or within the authorizing provider's specialty.  See \"Patient Info\" tab in inbasket, or \"Choose Columns\" in Meds & Orders section of the refill encounter.             Passed - Patient is age 20 years or older    If ASA is flagged for ages under 20 years old. Forward to provider for confirmation Ryes Syndrome is not a concern.             Passed - Medication is active on med list          "

## 2019-02-18 ENCOUNTER — OFFICE VISIT (OUTPATIENT)
Dept: PSYCHIATRY | Facility: CLINIC | Age: 59
End: 2019-02-18
Payer: MEDICARE

## 2019-02-18 ENCOUNTER — OFFICE VISIT (OUTPATIENT)
Dept: FAMILY MEDICINE | Facility: CLINIC | Age: 59
End: 2019-02-18
Payer: MEDICARE

## 2019-02-18 DIAGNOSIS — F20.89 OTHER SCHIZOPHRENIA (H): Primary | ICD-10-CM

## 2019-02-18 DIAGNOSIS — G89.29 CHRONIC SHOULDER PAIN: Primary | ICD-10-CM

## 2019-02-18 DIAGNOSIS — M25.519 CHRONIC SHOULDER PAIN: Primary | ICD-10-CM

## 2019-02-18 PROCEDURE — 99207 ZZC NO CHARGE NURSE ONLY: CPT

## 2019-02-18 PROCEDURE — 99207 ZZC CDG-MDM COMPONENT: MEETS LOW - DOWN CODED: CPT | Performed by: PSYCHIATRY & NEUROLOGY

## 2019-02-18 PROCEDURE — 99213 OFFICE O/P EST LOW 20 MIN: CPT | Performed by: PSYCHIATRY & NEUROLOGY

## 2019-02-18 NOTE — NURSING NOTE
PT seen by Dr Pedersen in psychiatry today. He has asked pt be triaged as she c/o shoulder pain.    S-(situation): Pt complains of pain across her upper back and in both shoulders. States voltaren not helping. Pt has had pain for over one month and spoke to PCP about this. No change in symptoms but pain makes it hard for her sleep . She has been taking tylenol every 4 hours and the staff at [facility] gives her this. She states she cannot take ibuprofen  Also states that her left heel has a sore on it, told this by staff at her [facility] - upon visual inspection of her left heel there is no open areas, just dry.    B-(background): pt was given referral for rheumatology and has appt is March 14      A-(assessment): this is not an acute change for her and she must wait for rheumatology appt    R-(recommendations): Try ice or heat on shoulders, Keep appt with rhematology. Use lotion daily on heels to prevent cracking from dry skin  Pt agrees to plan    Rosina Rao, RN, BSN

## 2019-02-18 NOTE — PROGRESS NOTES
PT seen by Dr Pedersen in psychiatry today. He has asked pt be triaged as she c/o shoulder pain.     S-(situation): Pt complains of pain across her upper back and in both shoulders. States voltaren not helping. Pt has had pain for over one month and spoke to PCP about this. No change in symptoms but pain makes it hard for her sleep . She has been taking tylenol every 4 hours and the staff at [facility] gives her this. She states she cannot take ibuprofen  Also states that her left heel has a sore on it, told this by staff at her [facility] - upon visual inspection of her left heel there is no open areas, just dry.     B-(background): pt was given referral for rheumatology and has appt is March 14        A-(assessment): this is not an acute change for her and she must wait for rheumatology appt     R-(recommendations): Try ice or heat on shoulders, Keep appt with rhematology. Use lotion daily on heels to prevent cracking from dry skin  Pt agrees to plan     Rosina Rao, RN, BSN

## 2019-02-20 ENCOUNTER — TELEPHONE (OUTPATIENT)
Dept: FAMILY MEDICINE | Facility: CLINIC | Age: 59
End: 2019-02-20

## 2019-02-20 DIAGNOSIS — Z79.4 TYPE 2 DIABETES MELLITUS WITH MILD NONPROLIFERATIVE RETINOPATHY WITHOUT MACULAR EDEMA, WITH LONG-TERM CURRENT USE OF INSULIN, UNSPECIFIED LATERALITY (H): Primary | ICD-10-CM

## 2019-02-20 DIAGNOSIS — E11.3299 TYPE 2 DIABETES MELLITUS WITH MILD NONPROLIFERATIVE RETINOPATHY WITHOUT MACULAR EDEMA, WITH LONG-TERM CURRENT USE OF INSULIN, UNSPECIFIED LATERALITY (H): Primary | ICD-10-CM

## 2019-02-20 NOTE — TELEPHONE ENCOUNTER
Call from Alva with group home stating that pt need refills on her test strips.    Alva tel: [phone]      Giorgi Case

## 2019-02-21 ENCOUNTER — TELEPHONE (OUTPATIENT)
Dept: FAMILY MEDICINE | Facility: CLINIC | Age: 59
End: 2019-02-21

## 2019-02-21 DIAGNOSIS — Z79.4 TYPE 2 DIABETES MELLITUS WITH MILD NONPROLIFERATIVE RETINOPATHY WITHOUT MACULAR EDEMA, WITH LONG-TERM CURRENT USE OF INSULIN, UNSPECIFIED LATERALITY (H): ICD-10-CM

## 2019-02-21 DIAGNOSIS — E11.3299 TYPE 2 DIABETES MELLITUS WITH MILD NONPROLIFERATIVE RETINOPATHY WITHOUT MACULAR EDEMA, WITH LONG-TERM CURRENT USE OF INSULIN, UNSPECIFIED LATERALITY (H): ICD-10-CM

## 2019-02-27 ENCOUNTER — OFFICE VISIT (OUTPATIENT)
Dept: FAMILY MEDICINE | Facility: CLINIC | Age: 59
End: 2019-02-27
Payer: MEDICARE

## 2019-02-27 ENCOUNTER — OFFICE VISIT (OUTPATIENT)
Dept: BEHAVIORAL HEALTH | Facility: CLINIC | Age: 59
End: 2019-02-27
Payer: MEDICARE

## 2019-02-27 VITALS
WEIGHT: 243 LBS | HEART RATE: 82 BPM | HEIGHT: 60 IN | OXYGEN SATURATION: 93 % | DIASTOLIC BLOOD PRESSURE: 68 MMHG | RESPIRATION RATE: 16 BRPM | SYSTOLIC BLOOD PRESSURE: 120 MMHG | TEMPERATURE: 98 F | BODY MASS INDEX: 47.71 KG/M2

## 2019-02-27 DIAGNOSIS — Z79.4 TYPE 2 DIABETES MELLITUS WITH STAGE 3 CHRONIC KIDNEY DISEASE, WITH LONG-TERM CURRENT USE OF INSULIN (H): ICD-10-CM

## 2019-02-27 DIAGNOSIS — R76.8 POSITIVE ANA (ANTINUCLEAR ANTIBODY): ICD-10-CM

## 2019-02-27 DIAGNOSIS — F25.1 SCHIZOAFFECTIVE DISORDER, DEPRESSIVE TYPE (H): ICD-10-CM

## 2019-02-27 DIAGNOSIS — M25.512 CHRONIC PAIN OF BOTH SHOULDERS: Primary | ICD-10-CM

## 2019-02-27 DIAGNOSIS — F43.10 POSTTRAUMATIC STRESS DISORDER: ICD-10-CM

## 2019-02-27 DIAGNOSIS — N18.30 TYPE 2 DIABETES MELLITUS WITH STAGE 3 CHRONIC KIDNEY DISEASE, WITH LONG-TERM CURRENT USE OF INSULIN (H): ICD-10-CM

## 2019-02-27 DIAGNOSIS — E78.5 HYPERLIPIDEMIA LDL GOAL <100: ICD-10-CM

## 2019-02-27 DIAGNOSIS — E11.22 TYPE 2 DIABETES MELLITUS WITH STAGE 3 CHRONIC KIDNEY DISEASE, WITH LONG-TERM CURRENT USE OF INSULIN (H): ICD-10-CM

## 2019-02-27 DIAGNOSIS — G89.29 CHRONIC PAIN OF BOTH SHOULDERS: Primary | ICD-10-CM

## 2019-02-27 DIAGNOSIS — M25.511 CHRONIC PAIN OF BOTH SHOULDERS: Primary | ICD-10-CM

## 2019-02-27 DIAGNOSIS — F25.1 SCHIZOAFFECTIVE DISORDER, DEPRESSIVE TYPE (H): Primary | ICD-10-CM

## 2019-02-27 LAB
ERYTHROCYTE [DISTWIDTH] IN BLOOD BY AUTOMATED COUNT: 13.6 % (ref 10–15)
ERYTHROCYTE [SEDIMENTATION RATE] IN BLOOD BY WESTERGREN METHOD: 52 MM/H (ref 0–30)
HCT VFR BLD AUTO: 31.2 % (ref 35–47)
HGB BLD-MCNC: 10 G/DL (ref 11.7–15.7)
MCH RBC QN AUTO: 31 PG (ref 26.5–33)
MCHC RBC AUTO-ENTMCNC: 32.1 G/DL (ref 31.5–36.5)
MCV RBC AUTO: 97 FL (ref 78–100)
PLATELET # BLD AUTO: 207 10E9/L (ref 150–450)
RBC # BLD AUTO: 3.23 10E12/L (ref 3.8–5.2)
WBC # BLD AUTO: 6.9 10E9/L (ref 4–11)

## 2019-02-27 PROCEDURE — 99214 OFFICE O/P EST MOD 30 MIN: CPT | Performed by: FAMILY MEDICINE

## 2019-02-27 PROCEDURE — 85027 COMPLETE CBC AUTOMATED: CPT | Performed by: FAMILY MEDICINE

## 2019-02-27 PROCEDURE — 36415 COLL VENOUS BLD VENIPUNCTURE: CPT | Performed by: FAMILY MEDICINE

## 2019-02-27 PROCEDURE — 80048 BASIC METABOLIC PNL TOTAL CA: CPT | Performed by: FAMILY MEDICINE

## 2019-02-27 PROCEDURE — 90832 PSYTX W PT 30 MINUTES: CPT | Performed by: SOCIAL WORKER

## 2019-02-27 PROCEDURE — 85652 RBC SED RATE AUTOMATED: CPT | Performed by: FAMILY MEDICINE

## 2019-02-27 RX ORDER — PREDNISONE 20 MG/1
20 TABLET ORAL DAILY
Qty: 14 TABLET | Refills: 0 | Status: SHIPPED | OUTPATIENT
Start: 2019-02-27 | End: 2019-03-11

## 2019-02-27 ASSESSMENT — MIFFLIN-ST. JEOR: SCORE: 1603.74

## 2019-02-27 NOTE — PATIENT INSTRUCTIONS
Thank you for coming in today!      Here is a brief summary of the plan we discussed:  1.  Start taking prednisone 20mg daily for your shoulder pain.    2.  We will call with lab results within 1 week.  3.  Monitor blood sugars 4 times per day (your doing a great job!).  Call if blood sugar is >500 or you are feeling poorly.  Prednisone can increase your blood sugars so I expect you will see higher numbers.      I will have our nurses reach out to you on Friday and Monday to see how you are doing.      Please follow-up with me in 2 weeks.    I look forward to seeing you back in clinic!

## 2019-02-27 NOTE — PROGRESS NOTES
SUBJECTIVE:                                                    Nena Tang is a 59 year old female who presents to clinic today for the following health issues:  [facility]: Don    Here for follow-up on bilateral shoulder pain.  She had initially brought this concern to my attention on 12/20 and at that time it had be present for 4 weeks.  Her pain has persisted and range of motion continues to decrease due to pain.  She denies weakness.  No neurologic symptoms.  No neck pain.  Pain does not radiate.  Shoulders feel tender.  She is struggling to dress herself and do hair due to pain.  No skin changes or rashes.  Today she does not that hips have felt sore and stiff recently.  Previous evaluation showed elevated ESR, normal CRP, and positive AIDA.  Trial of Meloxicam not that effective and lead to elevated creatinine.  Tylenol has not been helpful, nor has Voltaren gel.  She is scheduled to see rheumatology in a few weeks.    Of note, she denies headache, vision changes, and pain with chewing.    Only other symptom she notes is mild non-productive cough x a few weeks.  No fevers.  No shortness of breath or wheezing.      Diabetes Follow-up      Patient is checking blood sugars: 4    Diabetic concerns: None     Symptoms of hypoglycemia (low blood sugar): none     Paresthesias (numbness or burning in feet) or sores: No     Date of last diabetic eye exam:  Due    I reviewed her blood glucose log for past few weeks - improved glycemic control noted (I note rare readings over 300) and she has had better compliance with taking insulin.    Diabetes Management Resources    Hyperlipidemia Follow-Up      Rate your low fat/cholesterol diet?: fair    Taking statin?  Yes, no muscle aches from statin    Other lipid medications/supplements?:  none    Hypertension Follow-up      Outpatient blood pressures are not being checked.    Low Salt Diet: no added salt    BP Readings from Last 2 Encounters:   02/27/19 120/68   02/08/19 128/72      Hemoglobin A1C (%)   Date Value   12/20/2018 6.4 (H)   08/06/2018 6.4 (H)     LDL Cholesterol Calculated (mg/dL)   Date Value   08/06/2018 84   06/27/2017 64       Mental Health Follow up  - schizoaffective disorder    Status since last visit: mood is pretty good despite pain    Complicating factors: persistent pain  Significant life event:  No   Current substance abuse:  None  Anxiety or Panic symptoms:  No    Sleep - poor due to pain, not using CPAP  Appetite - good    PHQ-9  English PHQ-9   Any Language             Problems taking medications regularly: No    Medication side effects: none    Diet: regular (no restrictions)    Social History     Tobacco Use     Smoking status: Never Smoker     Smokeless tobacco: Never Used   Substance Use Topics     Alcohol use: No     Comment: last month        Problem list and histories reviewed & adjusted, as indicated.  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 (H)     Illiterate     Irritable bowel syndrome     overweight - BMI >35     Takotsubo cardiomyopathy     CAD (coronary artery disease)     Restless legs syndrome (RLS)     CINDY (obstructive sleep apnea)- mild AHI 10.3     Verbal auditory hallucination     Chronic low back pain     Schizoaffective disorder, depressive type (H)     Migraine headache     HTN, goal below 140/90     Health Care Home     Lumbago     Cervicalgia     Cocaine abuse, episodic (H)     Suicidal ideation     Esophageal reflux     Mild nonproliferative diabetic retinopathy (H)     Tardive dyskinesia     Alcohol use     Left cataract     Falls frequently     History of uterine cancer     Psychophysiological insomnia     Dysuria     Asymptomatic postmenopausal status     Abdominal pain, right lower quadrant     Infectious  encephalopathy     Non-intractable vomiting with nausea     Thoughts of self harm     Gastroenteritis     Posttraumatic stress disorder     Cervical cancer screening     Type 2 diabetes mellitus with stage 3 chronic kidney disease, with long-term current use of insulin (H)     Positive AIDA (antinuclear antibody)     Past Surgical History:   Procedure Laterality Date     C OOPHORECTORMY FOR RAHEL, W/BX  1983    UTERINE     CATARACT IOL, RT/LT Bilateral 2017     COLONOSCOPY N/A 3/16/2017    Procedure: COLONOSCOPY;  Surgeon: Traci Gonzalez MD;  Location:  GI     Coronary CTA  5/21/2014     HYSTERECTOMY  1983    uterine cancer yearly pap's per provider.     LAPAROSCOPIC CHOLECYSTECTOMY  2008     PHACOEMULSIFICATION CLEAR CORNEA WITH STANDARD INTRAOCULAR LENS IMPLANT Left 5/5/2017    Procedure: PHACOEMULSIFICATION CLEAR CORNEA WITH STANDARD INTRAOCULAR LENS IMPLANT;  LEFT EYE PHACOEMULSIFICATION CLEAR CORNEA WITH STANDARD INTRAOCULAR LENS IMPLANT ;  Surgeon: Tyra Diaz MD;  Location: [facility]     PHACOEMULSIFICATION CLEAR CORNEA WITH STANDARD INTRAOCULAR LENS IMPLANT Right 6/30/2017    Procedure: PHACOEMULSIFICATION CLEAR CORNEA WITH STANDARD INTRAOCULAR LENS IMPLANT;  RIGHT EYE PHACOEMULSIFICATION CLEAR CORNEA WITH STANDARD INTRAOCULAR LENS IMPLANT;  Surgeon: Tyra Diaz MD;  Location:  [facility]     RELEASE TRIGGER FINGER  10/11/2012    Left thumb. Procedure: RELEASE TRIGGER FINGER;  LEFT THUMB TRIGGER RELEASE;  Surgeon: Tay Langley MD;  Location:  [facility]     RELEASE TRIGGER FINGER Right 12/26/2016    Procedure: RELEASE TRIGGER FINGER;  Surgeon: Albino Castañeda MD;  Location: [facility]       Social History     Tobacco Use     Smoking status: Never Smoker     Smokeless tobacco: Never Used   Substance Use Topics     Alcohol use: No     Comment: last month     Family History   Problem Relation Age of Onset     Cancer Mother         BLADDER     Respiratory Mother         COPD     Gastrointestinal  Disease Mother         CIRRHOSIS OF LI BOLIVAR     Alcohol/Drug Mother      Diabetes Mother      Hypertension Mother      Lipids Mother      C.A.D. Mother      Glaucoma Mother      Alcohol/Drug Sister      Mental Illness Sister      Alcohol/Drug Sister      Psychotic Disorder Sister      Cancer Maternal Grandmother         UNKNOWN TYPE     Cancer Brother         COLON     Cancer - colorectal Brother         IN HIS LATE 30S     Alcohol/Drug Brother          OF HEROIN OVERDOSE AT AGE 22 YRS     Macular Degeneration No family hx of            Current Outpatient Medications   Medication Sig Dispense Refill     acetaminophen (TYLENOL) 500 MG tablet Take 2 tablets (1,000 mg) by mouth 3 times daily as needed for mild pain 100 tablet 3     albuterol (PROAIR HFA/PROVENTIL HFA/VENTOLIN HFA) 108 (90 Base) MCG/ACT Inhaler Inhale 2 puffs into the lungs every 6 hours as needed for shortness of breath / dyspnea or wheezing 1 Inhaler 0     aspirin (ASA) 81 MG EC tablet TAKE 1 TABLET (81MG) BY MOUTH DAILY 30 tablet 3     atorvastatin (LIPITOR) 80 MG tablet TAKE 1 TABLET (80MG) BY MOUTH DAILY 30 tablet 11     benztropine (COGENTIN) 0.5 MG tablet Take 2 tablets (1 mg) by mouth 2 times daily 120 tablet 2     blood glucose (NO BRAND SPECIFIED) test strip Use to test blood sugar  4 times daily or as directed. To accompany: Blood Glucose Monitor Brands: per insurance. 100 strip 11     calcium carbonate (OS-ALLEN 600 MG [employer] CA) 1500 (600 CA) MG tablet Take 1 tablet (1,500 mg) by mouth daily 180 tablet 3     diclofenac (VOLTAREN) 1 % topical gel Place 4 g onto the skin 4 times daily as needed for moderate pain 1 Tube 1     dulaglutide (TRULICITY) 1.5 MG/0.5ML pen Inject 1.5 mg Subcutaneous every 7 days 2 mL 3     fluticasone (FLONASE) 50 MCG/ACT spray Spray 1-2 sprays into both nostrils daily 1 Bottle 11     gabapentin (NEURONTIN) 300 MG capsule TAKE 2 CAPSULES (600MG) BY MOUTH THREE TIMES A  capsule 1     glucose 4 G CHEW chewable  tablet Take 2 every 15 minutes for blood sugar <70mg/dL. Recheck blood sugar every 15 minutes until above 70mg/dL, then eat a substantial meal. 20 tablet 1     guaiFENesin (ROBITUSSIN) 20 mg/mL SOLN solution Take 10 mLs by mouth every 6 hours as needed for cough 236 mL 0     insulin aspart (NOVOLOG FLEXPEN) 100 UNIT/ML pen Inject 20 Units Subcutaneous 3 times daily (with meals) Once daily, can add additional 5 units if BGs are >500mg/dL. 15 mL 11     insulin glargine (LANTUS SOLOSTAR) 100 UNIT/ML pen Inject 48 Units Subcutaneous At Bedtime 3 mL 11     insulin pen needle (NOVOFINE 30) 30G X 8 MM USE 4 DAILY OR AS DIRECTED 400 each 0     ipratropium - albuterol 0.5 mg/2.5 mg/3 mL (DUONEB) 0.5-2.5 (3) MG/3ML neb solution Take 1 vial (3 mLs) by nebulization every 6 hours as needed for shortness of breath / dyspnea or wheezing 30 vial 0     losartan (COZAAR) 50 MG tablet Take 1 tablet (50 mg) by mouth daily 90 tablet 3     melatonin 5 MG CAPS Take 2 capsules by mouth At Bedtime 180 capsule 3     metoprolol succinate (TOPROL-XL) 25 MG 24 hr tablet Take 1 tablet (25 mg) by mouth daily 90 tablet 1     paliperidone ER (INVEGA) 9 MG 24 hr tablet Take 1 tablet (9 mg) by mouth every morning 30 tablet 2     polyethylene glycol (MIRALAX/GLYCOLAX) powder TAKE 8.5 GRAMS (1/2 CAPFUL) BY MOUTH DAILY 527 g 1     predniSONE (DELTASONE) 20 MG tablet Take 20 mg by mouth daily. 14 tablet 0     ranitidine (ZANTAC) 300 MG tablet TAKE 1 TABLET (300MG) BY MOUTH AT BEDTIME 90 tablet 1     senna-docusate (SENOKOT-S;PERICOLACE) 8.6-50 MG per tablet Take 1 tablet by mouth 2 times daily as needed for constipation 100 tablet 1     sertraline (ZOLOFT) 100 MG tablet Take 2 tablets (200 mg) by mouth daily 60 tablet 2     spacer (OPTICHAMBER PATRICIA) holding chamber Holding/spacer device to use with inhaler. 1 each 0     SUMAtriptan (IMITREX) 25 MG tablet Take 1-2 tablets (25-50 mg) by mouth at onset of headache for migraine May repeat in 2 hours. Max  8 tablets/24 hours. 12 tablet 1     topiramate (TOPAMAX) 25 MG tablet Take 3 tablets (75 mg) by mouth 2 times daily 180 tablet 3     vitamin D3 (CHOLECALCIFEROL) 01446 UNITS capsule TAKE 1 CAPSULE (50,000 UNITS) MONTHLY 12 capsule 1     Allergies   Allergen Reactions     Imidazole Antifungals Hives     Tolerates diflucan     Ketoprofen Itching     Pruritis to topical     Lisinopril Hives     Metformin Other (See Comments)     Patient hospitalized for lactic acidosis - admitting provider suspectd caused by metformin     Metronidazole Hives     Posaconazole Hives     Tolerates diflucan     Recent Labs   Lab Test 01/30/19  1215 12/20/18  1532  10/17/18  1531 08/06/18  1038  05/28/18  1625 05/22/18  1434  11/07/17  0801  06/27/17  1358  07/13/16  1350  07/14/15  1215   A1C  --  6.4*  --   --  6.4*  --   --  6.2*   < > 8.9*   < > 7.0*   < >  --    < > 9.1*   LDL  --   --   --   --  84  --   --   --   --   --   --  64  --  137*  --  78   HDL  --   --   --   --  46*  --   --   --   --   --   --  37*  --   --   --  39*   TRIG  --   --   --   --  215*  --   --   --   --   --   --  267*  --   --   --  151*   ALT 20  --   --  23  --   --  27  --    < > 23   < > 32   < >  --    < >  --    CR 1.31* 0.99   < > 1.04  --    < > 1.16*  --    < > 0.88   < > 0.78   < >  --    < > 0.67   GFRESTIMATED 45* 63   < > 54*  --    < > 48*  --    < > 66   < > 76   < >  --    < > >90  Non  GFR Calc     GFRESTBLACK 52* 73   < > 66  --    < > 58*  --    < > 80   < > >90   GFR Calc     < >  --    < > >90   GFR Calc     POTASSIUM 4.7 4.2   < > 4.3  --    < > 4.5  --    < > 3.9   < > 4.3   < >  --    < > 4.3   TSH  --  1.98  --   --   --   --   --   --   --  3.21  --   --    < >  --    < >  --     < > = values in this interval not displayed.      BP Readings from Last 3 Encounters:   02/27/19 120/68   02/08/19 128/72   01/30/19 102/58    Wt Readings from Last 3 Encounters:   02/27/19 110.2 kg (243 lb)    02/08/19 108.9 kg (240 lb)   01/30/19 108.6 kg (239 lb 8 oz)        Labs reviewed in EPIC  Problem list, Medication list, Allergies, and Medical/Social/Surgical histories reviewed in [facility] and updated as appropriate.     ROS: Constitutional, neuro, endocrine, pulmonary, cardiac, gastrointestinal, musculoskeletal, integument and psychiatric systems are negative, except as otherwise noted above in the HPI.     OBJECTIVE:                                                    /68   Pulse 82   Temp 98  F (36.7  C) (Oral)   Resp 16   Ht 1.524 m (5')   Wt 110.2 kg (243 lb)   LMP 01/06/2015   SpO2 93%   BMI 47.46 kg/m    Body mass index is 47.46 kg/m .  GENERAL: healthy, alert, well nourished, well hydrated, no distress  EYES: Eyes grossly normal to inspection, no scleral icterus  NECK: no tenderness, no adenopathy, no asymmetry, no masses, no stiffness  RESP: lungs clear to auscultation - no rales, no rhonchi, no wheezes  CV: regular rates and rhythm, no murmur  MS: extremities- no gross deformities noted, bilateral tenderness of shoulders and upper arm with overall decreased ROM of bilateral shoulder in all planes, no edema  SKIN: no suspicious lesions, no rashes  NEURO: alert and oriented, no focal deficits, no specific weakness appreciated in bilateral upper or lower extremities but shoulder strength is difficult to assess well due to pain, no ataxia, no tremor    Mental Status Assessment:  Appearance:   Appropriate   Eye Contact:   Fair   Psychomotor Behavior: Retarded (Slowed)   Attitude:   Cooperative   Orientation:   All  Speech   Rate / Production: Monotone  Slow    Volume:  Soft   Mood:    Normal  Affect:    Flat   Thought Content:  Clear   Thought Form:  Coherent  Logical   Insight:    Fair   See [facility] notes      ASSESSMENT/PLAN:                                                      1. Chronic pain of both shoulders  2. Positive AIDA (antinuclear antibody)  Elevated ESR has been persistent - recheck  today.  CRP was previously normal  AIDA positive - rheum consult pending (scheduled for 3/14)  Bilateral shoulder XR done on 1/18 - no acute findings, AC OA on right  Inadequate treatment with NSAID (also elevated Cr, recheck today - may need additional evaluation if persists or worsens), tylenol, and voltaren gel.  Chronic anemia has been stable, check blood count again today.    I had previously considered diagnosis of PMR (among other rheumatologic diagnosis) but was hesitant to start prednisone given labile blood sugars however, given persistent pain that is really impacting ADLs and sleep and new complaint of hip pain/stiffness I think think this is the most reasonable option at this time.  Her blood glucose readings actually look pretty good today and we discussed at length the importance of continuing to monitor closely given that prednisone will raise glucose levels.  Also discussed risk of disrupted sleep (which she already is struggling with due to pain so not a huge concern) and increased appetite.  Advised her that long term risks include loss of bone density and need to wean steroids.  Explained that if this is PMR I would expect a significant improvement in her symptoms within a week.  If symptoms not improving at that time we can discontinue prednisone and await rheum consult.  If symptoms are improving we will continue prednisone and discuss ongoing plan with rheum.  I will ask our RNs to call patient in 2 days and again in 5 days to follow-up on symptoms and blood sugars (may need to add sliding scale insulin).  I will have her see me back in 2 weeks to reassess.      - predniSONE (DELTASONE) 20 MG tablet; Take 20 mg by mouth daily.  Dispense: 14 tablet; Refill: 0  - Basic metabolic panel  (Ca, Cl, CO2, Creat, Gluc, K, Na, BUN)  - ESR: Erythrocyte sedimentation rate  - CBC with platelets    3. Hyperlipidemia LDL goal <100  She is on a statin.  CK was previously checked and normal so I do not think  this is contributing to her pain.    4. Type 2 diabetes mellitus with stage 3 chronic kidney disease, with long-term current use of insulin (H)  Improved readings noted today.  See above - expect elevation given initiation of prednisone.  May need to add sliding scale.  Will continue to have close follow-up.    5. Schizoaffective disorder, depressive type (H)  Stable mood.  She will continue to work with [facility].  No medication changes.  Will need to mood response to prednisone as well.      She will see me back in 2 weeks, sooner if needed.  She expressed agreement and understanding with plan as above.        Rebecca Carr, DO  [facility]

## 2019-02-28 ENCOUNTER — TELEPHONE (OUTPATIENT)
Dept: FAMILY MEDICINE | Facility: CLINIC | Age: 59
End: 2019-02-28

## 2019-02-28 DIAGNOSIS — G89.29 CHRONIC PAIN OF BOTH SHOULDERS: ICD-10-CM

## 2019-02-28 DIAGNOSIS — M25.512 CHRONIC PAIN OF BOTH SHOULDERS: ICD-10-CM

## 2019-02-28 DIAGNOSIS — M25.511 CHRONIC PAIN OF BOTH SHOULDERS: ICD-10-CM

## 2019-02-28 LAB
ANION GAP SERPL CALCULATED.3IONS-SCNC: 8 MMOL/L (ref 3–14)
BUN SERPL-MCNC: 19 MG/DL (ref 7–30)
CALCIUM SERPL-MCNC: 9.2 MG/DL (ref 8.5–10.1)
CHLORIDE SERPL-SCNC: 110 MMOL/L (ref 94–109)
CO2 SERPL-SCNC: 22 MMOL/L (ref 20–32)
CREAT SERPL-MCNC: 0.93 MG/DL (ref 0.52–1.04)
GFR SERPL CREATININE-BSD FRML MDRD: 68 ML/MIN/{1.73_M2}
GLUCOSE SERPL-MCNC: 137 MG/DL (ref 70–99)
POTASSIUM SERPL-SCNC: 4.4 MMOL/L (ref 3.4–5.3)
SODIUM SERPL-SCNC: 140 MMOL/L (ref 133–144)

## 2019-02-28 NOTE — TELEPHONE ENCOUNTER
Will ask RN to call patient and/or caregiver on Friday 3/1 and also on Monday 3/4 to follow-up on:    1.  Bilateral shoulder pain - has it improved with prednisone?  2.  Blood sugars - highs/lows?      Please send me updates.      Rebecca Carr, DO on 2/28/2019 at 9:43 AM

## 2019-03-04 NOTE — TELEPHONE ENCOUNTER
"Attempted to reach staff at group home  \"Adri\" at number listed, unable. Left message  to call back.  Voice recording on her phone not clear    Rosina Rao RN    "

## 2019-03-04 NOTE — TELEPHONE ENCOUNTER
Great thank you.    Yes, moving prednisone to morning (with breakfast is reasonable).    I'm glad pain is improving.  I am going to send another week of prednisone to pharmacy (I don't want her to run out) - then ongoing can discuss with rheumatology.  I would like her to work on some gentle range of motion exercises now that pain is improving.     Please follow-up on blood sugars on Thursday 3/7.      Rebecca Carr, DO on 3/4/2019 at 10:53 AM

## 2019-03-04 NOTE — TELEPHONE ENCOUNTER
This was relayed to pt  Her appt is March 14 with specialist    I left message for Alva to call back as well    Rosina Rao, RN, BSN

## 2019-03-04 NOTE — TELEPHONE ENCOUNTER
"1. Pain    Pt states that the prednisone is helping a little bit \"not knocking the pain out but helping\"    Blood sugars are running a little higher per pt    I then spoke Alva at [facility] per pt's request    Alva feels she has had a really good response to the prednisone for the pain control, she told Alva it takes the edge off. However her ROM seems the same. She has had 5 doses of the prednisone thus far     .   2. Blood Sugars    Per Alva, prednisone has significantly raised BS, averaging 200 in the morning, even up to 293    The trend for the noon reading and the evening readings are  more in her usual range (perhaps 20 units higher)     Per pharmacy the affect of the prednisone on the morning BS would be less if she took the prednisone earlier in the day.  . Should they move it to noon? Now she is getting it at 5pm.     (lab result also relayed to both Alva and pt)    What do you advise?  Rosina Rao RN    "

## 2019-03-07 ENCOUNTER — MEDICAL CORRESPONDENCE (OUTPATIENT)
Dept: HEALTH INFORMATION MANAGEMENT | Facility: CLINIC | Age: 59
End: 2019-03-07

## 2019-03-08 ENCOUNTER — TELEPHONE (OUTPATIENT)
Dept: FAMILY MEDICINE | Facility: CLINIC | Age: 59
End: 2019-03-08

## 2019-03-11 RX ORDER — PREDNISONE 20 MG/1
20 TABLET ORAL DAILY
Qty: 7 TABLET | Refills: 0 | Status: SHIPPED | OUTPATIENT
Start: 2019-03-13 | End: 2019-03-14

## 2019-03-11 NOTE — TELEPHONE ENCOUNTER
Spoke with Zheng at , he understands the course of prednisone will go an additional 7 days.  Yong JIN

## 2019-03-11 NOTE — TELEPHONE ENCOUNTER
I realized I did not send additional week of prednisone so sent now.  Please make sure she knows to continue prednisone.  She see rheumatology this week and then me on 3/18.      Thanks.    Rebecca Carr, DO on 3/11/2019 at 3:25 PM     Patient is calling to speak with Rn regarding pregnancy migraines

## 2019-03-13 DIAGNOSIS — E55.9 VITAMIN D DEFICIENCY: ICD-10-CM

## 2019-03-13 RX ORDER — CHOLECALCIFEROL (VITAMIN D3) 1250 MCG
CAPSULE ORAL
Qty: 4 CAPSULE | Refills: 3 | Status: ON HOLD | OUTPATIENT
Start: 2019-03-13 | End: 2019-12-31

## 2019-03-13 NOTE — PROGRESS NOTES
[facility] Visit     Nena Tang MRN# [id number]   YOB: 1960    Primary care provider: Rowena Haas  Mar 14, 2019          Assessment and Plan:   # Steroid responsive polyarthralgia - onset 2018  # Positive AIDA 1:160 centromere pattern  # Anemia  # Elevated sed rate  # Osteopenia  # Coronary artery disease; h/o takotsuba cardiomyopathy  # Type 2 diabetes mellitus  # H/o uterine cancer - decades ago  # Past h/o suicidal ideation; past h/o cocaine/heroin use  # Retinopathy due to Diabetes mellitus    Creat, AST, ALT within normal limits   CK within normal limits  RF, CCP neg.   TSH within normal limits  Vit D within normal limits    There is some suspicion for autoimmune connective tissue disorder. We will get the following work up done.     Polyarthralgia still uncontrolled (but better) on Prednisone 20 mg PO daily started 2/19 by Dr. Carr    Meloxicam did not help.     We will avoid plaquenil because of retinopathy.     Azathioprine - infection risk discussed. Start if TPMT normal.     Prednisone start taper to 10mg PO daily. We discussed side effects of long term use of prednisone. Goal is to taper off.     The labs from patient records are reviewed.     I will be back in touch with the patient through mychart/letter when results are available.     Patient agrees with the above mentioned treatment plan.     Most Recent Immunizations   Administered Date(s) Administered     Influenza (IIV3) PF 09/24/2012     Influenza Vaccine IM 3yrs+ 4 Valent IIV4 11/06/2017     Mantoux Tuberculin Skin Test 07/10/2014     Pneumococcal 23 valent 01/22/2009     TDAP Vaccine (Adacel) 01/22/2009       Orders Placed This Encounter   Procedures     DNA double stranded antibodies     VIJAY antibody panel     Complement C4     Complement C3     Erythrocyte sedimentation rate auto     Thiopurine Methyltransferase RBC       Return in about 2 weeks (around 3/28/2019).    Medications Discontinued  During This Encounter   Medication Reason     predniSONE (DELTASONE) 20 MG tablet      Current Outpatient Medications   Medication Sig Dispense Refill     acetaminophen (TYLENOL) 500 MG tablet Take 2 tablets (1,000 mg) by mouth 3 times daily as needed for mild pain 100 tablet 3     albuterol (PROAIR HFA/PROVENTIL HFA/VENTOLIN HFA) 108 (90 Base) MCG/ACT Inhaler Inhale 2 puffs into the lungs every 6 hours as needed for shortness of breath / dyspnea or wheezing 1 Inhaler 0     aspirin (ASA) 81 MG EC tablet TAKE 1 TABLET (81MG) BY MOUTH DAILY 30 tablet 3     atorvastatin (LIPITOR) 80 MG tablet TAKE 1 TABLET (80MG) BY MOUTH DAILY 30 tablet 11     benztropine (COGENTIN) 0.5 MG tablet Take 2 tablets (1 mg) by mouth 2 times daily 120 tablet 2     blood glucose (NO BRAND SPECIFIED) test strip Use to test blood sugar  4 times daily or as directed. To accompany: Blood Glucose Monitor Brands: per insurance. 100 strip 11     calcium carbonate (OS-ALLEN 600 MG [place]. CA) 1500 (600 CA) MG tablet Take 1 tablet (1,500 mg) by mouth daily 180 tablet 3     cholecalciferol (VITAMIN D3) 91330 units (1250 mcg) capsule TAKE 1 CAPSULE (50,000 UNITS) MONTHLY 4 capsule 3     diclofenac (VOLTAREN) 1 % topical gel Place 4 g onto the skin 4 times daily as needed for moderate pain 1 Tube 1     dulaglutide (TRULICITY) 1.5 MG/0.5ML pen Inject 1.5 mg Subcutaneous every 7 days 2 mL 3     fluticasone (FLONASE) 50 MCG/ACT spray Spray 1-2 sprays into both nostrils daily 1 Bottle 11     gabapentin (NEURONTIN) 300 MG capsule TAKE 2 CAPSULES (600MG) BY MOUTH THREE TIMES A  capsule 1     glucose 4 G CHEW chewable tablet Take 2 every 15 minutes for blood sugar <70mg/dL. Recheck blood sugar every 15 minutes until above 70mg/dL, then eat a substantial meal. 20 tablet 1     guaiFENesin (ROBITUSSIN) 20 mg/mL SOLN solution Take 10 mLs by mouth every 6 hours as needed for cough 236 mL 0     hydroxychloroquine (PLAQUENIL) 200 MG tablet Take 1 tablet (200 mg) by  mouth daily Annual Plaquenil toxicity eye screening required. 60 tablet 3     insulin aspart (NOVOLOG FLEXPEN) 100 UNIT/ML pen Inject 20 Units Subcutaneous 3 times daily (with meals) Once daily, can add additional 5 units if BGs are >500mg/dL. 15 mL 11     insulin glargine (LANTUS SOLOSTAR) 100 UNIT/ML pen Inject 48 Units Subcutaneous At Bedtime 3 mL 11     insulin pen needle (NOVOFINE 30) 30G X 8 MM USE 4 DAILY OR AS DIRECTED 400 each 0     ipratropium - albuterol 0.5 mg/2.5 mg/3 mL (DUONEB) 0.5-2.5 (3) MG/3ML neb solution Take 1 vial (3 mLs) by nebulization every 6 hours as needed for shortness of breath / dyspnea or wheezing 30 vial 0     losartan (COZAAR) 50 MG tablet Take 1 tablet (50 mg) by mouth daily 90 tablet 3     melatonin 5 MG CAPS Take 2 capsules by mouth At Bedtime 180 capsule 3     metoprolol succinate (TOPROL-XL) 25 MG 24 hr tablet Take 1 tablet (25 mg) by mouth daily 90 tablet 1     paliperidone ER (INVEGA) 9 MG 24 hr tablet Take 1 tablet (9 mg) by mouth every morning 30 tablet 2     polyethylene glycol (MIRALAX/GLYCOLAX) powder TAKE 8.5 GRAMS (1/2 CAPFUL) BY MOUTH DAILY 527 g 1     predniSONE (DELTASONE) 5 MG tablet 15mg PO daily X 2 weeks; then 10mg Po daily. 90 tablet 1     ranitidine (ZANTAC) 300 MG tablet TAKE 1 TABLET (300MG) BY MOUTH AT BEDTIME 90 tablet 1     senna-docusate (SENOKOT-S;PERICOLACE) 8.6-50 MG per tablet Take 1 tablet by mouth 2 times daily as needed for constipation 100 tablet 1     sertraline (ZOLOFT) 100 MG tablet Take 2 tablets (200 mg) by mouth daily 60 tablet 2     spacer (OPTICHAMBER PATRICIA) holding chamber Holding/spacer device to use with inhaler. 1 each 0     SUMAtriptan (IMITREX) 25 MG tablet Take 1-2 tablets (25-50 mg) by mouth at onset of headache for migraine May repeat in 2 hours. Max 8 tablets/24 hours. 12 tablet 1     topiramate (TOPAMAX) 25 MG tablet Take 3 tablets (75 mg) by mouth 2 times daily 180 tablet 3       Eladio Kwong MD  [facility]          Active Problem List:     Patient Active Problem List    Diagnosis Date Noted     CAD (coronary artery disease) 02/29/2012     Priority: High      Tako-Tsubo stress cardiomyopathy 2009. Mild coronary artery disease,        Positive AIDA (antinuclear antibody) 01/31/2019     Priority: Medium     Type 2 diabetes mellitus with stage 3 chronic kidney disease, with long-term current use of insulin (H) 06/05/2018     Priority: Medium     Cervical cancer screening 06/01/2018     Priority: Medium     Pt age 58  05/22/18: She had a total hysterectomy 35 years ago for uterine cancer, NIL pap, Neg HR HPV result. Plan cease pap screening per provider.  No history of MEHREEN 2 or greater found in epic.   No further cervical cancer screening recommended.    updated.              Posttraumatic stress disorder 01/29/2018     Priority: Medium     Gastroenteritis 12/08/2017     Priority: Medium     Thoughts of self harm 10/23/2017     Priority: Medium     Infectious encephalopathy 09/04/2017     Priority: Medium     Non-intractable vomiting with nausea 09/04/2017     Priority: Medium     Abdominal pain, right lower quadrant 07/13/2017     Priority: Medium     Asymptomatic postmenopausal status 06/28/2017     Priority: Medium     Dysuria 06/12/2017     Priority: Medium     Psychophysiological insomnia 03/09/2017     Priority: Medium     History of uterine cancer 12/07/2016     Priority: Medium     Yearly pap's per the provider office visit note on 12/07/16.  05/22/18: She had a total hysterectomy 35 years ago for uterine cancer, NIL pap, Neg HR HPV result. Plan cease pap screening per provider.       Falls frequently 08/18/2016     Priority: Medium     Left cataract 07/25/2016     Priority: Medium     Alcohol use 04/19/2016     Priority: Medium     Tardive dyskinesia 09/11/2015     Priority: Medium     Mild nonproliferative diabetic retinopathy (H) 06/19/2014     Priority: Medium     Problem list name updated by  automated process. Provider to review       Esophageal reflux 06/09/2014     Priority: Medium     Suicidal ideation 05/01/2014     Priority: Medium     Cocaine abuse, episodic (H) 10/03/2013     Priority: Medium     Lumbago 09/20/2013     Priority: Medium     Cervicalgia 09/20/2013     Priority: Medium     Health Care Home 08/16/2013     Priority: Medium     EMERGENCY CARE PLAN  Presenting Problem Signs and Symptoms Treatment Plan    Questions or concerns during clinic hours    I will call the clinic directly     Questions or concerns outside clinic hours    I will call the 24 hour nurse line at [phone]    Patient needs to schedule an appointment    I will call the 24 hour scheduling team at [phone] or clinic directly    Same day treatment     I will call the clinic first, nurse line if after hours, urgent care and express care if needed     Primary Care Provider Dr. Honorio Dias [facility] [phone]  Nurse Care Coordinator Idalmis Nettles -[phone]        HTN, goal below 140/90 07/29/2013     Priority: Medium     Migraine headache 04/22/2013     Priority: Medium     Schizoaffective disorder, depressive type (H) 02/25/2013     Priority: Medium     Chronic low back pain 01/22/2013     Priority: Medium     Verbal auditory hallucination 10/04/2012     Priority: Medium     CINDY (obstructive sleep apnea)- mild AHI 10.3 03/08/2012     Priority: Medium     Sleep study 3/12 (198#)-   AHI 10.3, with significant desaturations down to 74%. RDI 10.3. Periodic Limb Movement Index 3.2/hour.         Type 2 diabetes mellitus with mild nonproliferative retinopathy (H) 08/30/2011     Priority: Medium     Illiterate 08/30/2011     Priority: Medium     Rotator cuff syndrome 07/06/2011     Priority: Medium     Hyperlipidemia LDL goal <100 10/31/2010     Priority: Medium     Restless legs syndrome (RLS) 02/29/2012     Priority: Low     Irritable bowel syndrome      Priority: Low     overweight - BMI  >35      Priority: Low     Takotsubo cardiomyopathy      Priority: Low     2009. Echo 2010, angio 2011 with normal LVF.        Vitamin B12 deficiency without anemia 11/23/2009     Priority: Low     Diagnosis updated by automated process. Provider to review and confirm.       Osteopenia 10/07/2009     Priority: Low     Dexa 2009- Lumbar Spine (L1-L4): T-score -1.8, Left Femoral Neck: T-score -1.0, Right Femoral Neck: T-score -1.0                   History of Present Illness:     Chief Complaint   Patient presents with     Establish Care     Referral       March 13, 2019  Have you ever seen a rheumatologist NO Who NA When NA  Joint pain history  Onset: Patient is here to establish care for multiple site pain in both shoulders and arms. Onset started in November 2018 and is continuous. Since starting on Prednisone pain has decreased.   Involved joints: both shoulders arms  Pain scale:  6/10 , since starting prednisone  Wakes the patient from sleep : Yes  Morning stiffness: Yes for 180 minutes  Meds used:tylenol, prednisone      Interim history  Since last visit:  1. Infections - No  2. New symptoms/medical problem - No  3. Any side effects from Rheum medications -NA  3. ER visits/Hospitalizations/surgeries - No  4. Last PCP visit: 2/27/19      Wt Readings from Last 4 Encounters:   03/14/19 105.7 kg (233 lb)   02/27/19 110.2 kg (243 lb)   02/08/19 108.9 kg (240 lb)   01/30/19 108.6 kg (239 lb 8 oz)     Cough on and off  Epistaxis on and off; resolved after stopping flonase.   abdominal pain on and off    No h/o myalgia, unintentional weight loss, fevers, rash, swollen glands  No h/o glaucoma.  Patient denies any raynauds, malar rash, skin photosensitivity, recurrent mouth ulcers, or arterial/venous thrombosis in the past  No h/o persistent shortness of breath,  chest pain  No h/o persistent vomiting, diarrhea,   No h/o hematochezia, hematuria, hemoptysis  No h/o seizures  No h/o sicca symptoms      BP Readings from Last  3 Encounters:   03/14/19 118/66   02/27/19 120/68   02/08/19 128/72              Review of Systems:   Complete ROS negative except for symptoms mentioned in the HPI          Past Medical History:     Past Medical History:   Diagnosis Date     Acute respiratory failure with hypoxia (H) 9/4/2017     CAD (coronary artery disease)     5/2014 cath, nonbostructive stenosis to LAD, RCA.     Chronic low back pain 1/22/2013     Cocaine abuse, in remission (H)      Fecal urgency 3/8/2012     History of heroin abuse      Hyperlipidemia LDL goal <100 10/31/2010     Hypertension 7/29/2013     Illiterate 8/30/2011     Irritable bowel syndrome      Left cataract      Migraine 4/19/2012     Migraine headache 4/22/2013     Moderate major depression (H) 6/8/2011     Noncompliance with medication regimen 6/8/2011     Obesity      CINDY (obstructive sleep apnea) 3/8/2012    uses CPAP     Osteopenia 10/7/2009     Pneumonia of right lower lobe due to infectious organism (H) 9/4/2017     Schizoaffective disorder, depressive type (H) 2/25/2013     Sepsis (H) 8/29/2017     Suicidal intent 10/2/2013     Takotsubo cardiomyopathy      Type 2 diabetes mellitus (H) 8/30/2011     Uterine cancer (H) 1983     Verbal auditory hallucination 10/4/2012     Past Surgical History:   Procedure Laterality Date     C OOPHORECTORMY FOR MALIG, W/BX  1983    UTERINE     CATARACT IOL, RT/LT Bilateral 2017     COLONOSCOPY N/A 3/16/2017    Procedure: COLONOSCOPY;  Surgeon: Traci Gonzalez MD;  Location:  GI     Coronary CTA  5/21/2014     HYSTERECTOMY  1983    uterine cancer yearly pap's per provider.     LAPAROSCOPIC CHOLECYSTECTOMY  2008     PHACOEMULSIFICATION CLEAR CORNEA WITH STANDARD INTRAOCULAR LENS IMPLANT Left 5/5/2017    Procedure: PHACOEMULSIFICATION CLEAR CORNEA WITH STANDARD INTRAOCULAR LENS IMPLANT;  LEFT EYE PHACOEMULSIFICATION CLEAR CORNEA WITH STANDARD INTRAOCULAR LENS IMPLANT ;  Surgeon: Tyra Diaz MD;  Location: [facility]      PHACOEMULSIFICATION CLEAR CORNEA WITH STANDARD INTRAOCULAR LENS IMPLANT Right 2017    Procedure: PHACOEMULSIFICATION CLEAR CORNEA WITH STANDARD INTRAOCULAR LENS IMPLANT;  RIGHT EYE PHACOEMULSIFICATION CLEAR CORNEA WITH STANDARD INTRAOCULAR LENS IMPLANT;  Surgeon: Tyra Diaz MD;  Location:  [facility]     RELEASE TRIGGER FINGER  10/11/2012    Left thumb. Procedure: RELEASE TRIGGER FINGER;  LEFT THUMB TRIGGER RELEASE;  Surgeon: Tay Langley MD;  Location:  [facility]     RELEASE TRIGGER FINGER Right 2016    Procedure: RELEASE TRIGGER FINGER;  Surgeon: Albino Castañeda MD;  Location:  OR            Social History:     Social History     Occupational History     Not on file   Tobacco Use     Smoking status: Never Smoker     Smokeless tobacco: Never Used   Substance and Sexual Activity     Alcohol use: No     Comment: last month     Drug use: No     Comment: history of     Sexual activity: No     Partners: Male     Birth control/protection: None, Condom            Family History:     Family History   Problem Relation Age of Onset     Cancer Mother         BLADDER     Respiratory Mother         COPD     Gastrointestinal Disease Mother         CIRRHOSIS OF LI BOLIVAR     Alcohol/Drug Mother      Diabetes Mother      Hypertension Mother      Lipids Mother      C.A.D. Mother      Glaucoma Mother      Alcohol/Drug Sister      Mental Illness Sister      Alcohol/Drug Sister      Psychotic Disorder Sister      Cancer Maternal Grandmother         UNKNOWN TYPE     Cancer Brother         COLON     Cancer - colorectal Brother         IN HIS LATE 30S     Alcohol/Drug Brother          OF HEROIN OVERDOSE AT AGE 22 YRS     Macular Degeneration No family hx of             Allergies:     Allergies   Allergen Reactions     Imidazole Antifungals Hives     Tolerates diflucan     Ketoprofen Itching     Pruritis to topical     Lisinopril Hives     Metformin Other (See Comments)     Patient hospitalized for lactic acidosis -  admitting provider suspectd caused by metformin     Metronidazole Hives     Posaconazole Hives     Tolerates diflucan            Medications:     Current Outpatient Medications   Medication Sig Dispense Refill     acetaminophen (TYLENOL) 500 MG tablet Take 2 tablets (1,000 mg) by mouth 3 times daily as needed for mild pain 100 tablet 3     albuterol (PROAIR HFA/PROVENTIL HFA/VENTOLIN HFA) 108 (90 Base) MCG/ACT Inhaler Inhale 2 puffs into the lungs every 6 hours as needed for shortness of breath / dyspnea or wheezing 1 Inhaler 0     aspirin (ASA) 81 MG EC tablet TAKE 1 TABLET (81MG) BY MOUTH DAILY 30 tablet 3     atorvastatin (LIPITOR) 80 MG tablet TAKE 1 TABLET (80MG) BY MOUTH DAILY 30 tablet 11     benztropine (COGENTIN) 0.5 MG tablet Take 2 tablets (1 mg) by mouth 2 times daily 120 tablet 2     blood glucose (NO BRAND SPECIFIED) test strip Use to test blood sugar  4 times daily or as directed. To accompany: Blood Glucose Monitor Brands: per insurance. 100 strip 11     calcium carbonate (OS-ALLEN 600 MG [place]. CA) 1500 (600 CA) MG tablet Take 1 tablet (1,500 mg) by mouth daily 180 tablet 3     cholecalciferol (VITAMIN D3) 19435 units (1250 mcg) capsule TAKE 1 CAPSULE (50,000 UNITS) MONTHLY 4 capsule 3     diclofenac (VOLTAREN) 1 % topical gel Place 4 g onto the skin 4 times daily as needed for moderate pain 1 Tube 1     dulaglutide (TRULICITY) 1.5 MG/0.5ML pen Inject 1.5 mg Subcutaneous every 7 days 2 mL 3     fluticasone (FLONASE) 50 MCG/ACT spray Spray 1-2 sprays into both nostrils daily 1 Bottle 11     gabapentin (NEURONTIN) 300 MG capsule TAKE 2 CAPSULES (600MG) BY MOUTH THREE TIMES A  capsule 1     glucose 4 G CHEW chewable tablet Take 2 every 15 minutes for blood sugar <70mg/dL. Recheck blood sugar every 15 minutes until above 70mg/dL, then eat a substantial meal. 20 tablet 1     guaiFENesin (ROBITUSSIN) 20 mg/mL SOLN solution Take 10 mLs by mouth every 6 hours as needed for cough 236 mL 0      hydroxychloroquine (PLAQUENIL) 200 MG tablet Take 1 tablet (200 mg) by mouth daily Annual Plaquenil toxicity eye screening required. 60 tablet 3     insulin aspart (NOVOLOG FLEXPEN) 100 UNIT/ML pen Inject 20 Units Subcutaneous 3 times daily (with meals) Once daily, can add additional 5 units if BGs are >500mg/dL. 15 mL 11     insulin glargine (LANTUS SOLOSTAR) 100 UNIT/ML pen Inject 48 Units Subcutaneous At Bedtime 3 mL 11     insulin pen needle (NOVOFINE 30) 30G X 8 MM USE 4 DAILY OR AS DIRECTED 400 each 0     ipratropium - albuterol 0.5 mg/2.5 mg/3 mL (DUONEB) 0.5-2.5 (3) MG/3ML neb solution Take 1 vial (3 mLs) by nebulization every 6 hours as needed for shortness of breath / dyspnea or wheezing 30 vial 0     losartan (COZAAR) 50 MG tablet Take 1 tablet (50 mg) by mouth daily 90 tablet 3     melatonin 5 MG CAPS Take 2 capsules by mouth At Bedtime 180 capsule 3     metoprolol succinate (TOPROL-XL) 25 MG 24 hr tablet Take 1 tablet (25 mg) by mouth daily 90 tablet 1     paliperidone ER (INVEGA) 9 MG 24 hr tablet Take 1 tablet (9 mg) by mouth every morning 30 tablet 2     polyethylene glycol (MIRALAX/GLYCOLAX) powder TAKE 8.5 GRAMS (1/2 CAPFUL) BY MOUTH DAILY 527 g 1     predniSONE (DELTASONE) 5 MG tablet 15mg PO daily X 2 weeks; then 10mg Po daily. 90 tablet 1     ranitidine (ZANTAC) 300 MG tablet TAKE 1 TABLET (300MG) BY MOUTH AT BEDTIME 90 tablet 1     senna-docusate (SENOKOT-S;PERICOLACE) 8.6-50 MG per tablet Take 1 tablet by mouth 2 times daily as needed for constipation 100 tablet 1     sertraline (ZOLOFT) 100 MG tablet Take 2 tablets (200 mg) by mouth daily 60 tablet 2     spacer (OPTICHAMBER PATRICIA) holding chamber Holding/spacer device to use with inhaler. 1 each 0     SUMAtriptan (IMITREX) 25 MG tablet Take 1-2 tablets (25-50 mg) by mouth at onset of headache for migraine May repeat in 2 hours. Max 8 tablets/24 hours. 12 tablet 1     topiramate (TOPAMAX) 25 MG tablet Take 3 tablets (75 mg) by mouth 2 times  daily 180 tablet 3            Physical Exam:   Blood pressure 118/66, pulse 82, temperature 97.1  F (36.2  C), temperature source Temporal, height 1.524 m (5'), weight 105.7 kg (233 lb), last menstrual period 01/06/2015, SpO2 95 %.  Wt Readings from Last 4 Encounters:   03/14/19 105.7 kg (233 lb)   02/27/19 110.2 kg (243 lb)   02/08/19 108.9 kg (240 lb)   01/30/19 108.6 kg (239 lb 8 oz)       Constitutional: well-developed, appearing stated age; cooperative  Eyes: normal conjunctiva, sclera  ENT: nl external ears, nose, lips.No mucous membrane lesions, normal saliva pool  Neck: no cervical lymphadenopathy  Resp: lungs clear to auscultation in the bases,   CV: RRR, no added sounds  GI: Abdomen soft and no tenderness  : not tested  Lymph: no cervical, supraclavicular or epitrochlear nodes  MS: All shoulder, elbow, wrist, MCP/PIP/DIP, hip, knee, ankle, and foot MTP/IP joints were examined and  found without active synovitis or major deformity. Full ROM.  No dactylitis,  tenosynovitis, enthesopathy.  Skin: no rash in exposed areas  Psych: nl judgement, orientation, memory, affect.         Data:         Eladio Kwong MD    [facility]

## 2019-03-13 NOTE — TELEPHONE ENCOUNTER
"Requested Prescriptions   Pending Prescriptions Disp Refills     cholecalciferol (VITAMIN D3) 36577 units (1250 mcg) capsule Last Written Prescription Date:  3/13/18  Last Fill Quantity: 12 capsule,  # refills: 1   Last office visit: 2/27/2019 with prescribing provider:     Future Office Visit:   Next 5 appointments (look out 90 days)    Mar 18, 2019 10:30 AM CDT  Return Visit with Rebecca Carr DO  [facility] ([facility]) [address]  [address]  [facility] [address]  [phone]   Mar 29, 2019  9:00 AM CDT  Return Visit with Kraig Pedersen MD  [facility] ([facility]) [address]  [facility] [address]  [phone]          12 capsule 1     Sig: TAKE 1 CAPSULE (50,000 UNITS) MONTHLY    Vitamin Supplements (Adult) Protocol Failed - 3/13/2019 10:18 AM       Failed - High dose Vitamin D not ordered       Passed - Recent (12 mo) or future (30 days) visit within the authorizing provider's specialty    Patient had office visit in the last 12 months or has a visit in the next 30 days with authorizing provider or within the authorizing provider's specialty.  See \"Patient Info\" tab in inbasket, or \"Choose Columns\" in Meds & Orders section of the refill encounter.             Passed - Medication is active on med list          "

## 2019-03-13 NOTE — TELEPHONE ENCOUNTER
Rx approved and refilled per [facility] refill protocol.     Corine Cunha RN  03/13/19  1:01 PM

## 2019-03-14 ENCOUNTER — OFFICE VISIT (OUTPATIENT)
Dept: RHEUMATOLOGY | Facility: CLINIC | Age: 59
End: 2019-03-14
Payer: MEDICARE

## 2019-03-14 ENCOUNTER — OFFICE VISIT (OUTPATIENT)
Dept: OPHTHALMOLOGY | Facility: CLINIC | Age: 59
End: 2019-03-14
Attending: OPHTHALMOLOGY
Payer: MEDICARE

## 2019-03-14 ENCOUNTER — TRANSFERRED RECORDS (OUTPATIENT)
Dept: HEALTH INFORMATION MANAGEMENT | Facility: CLINIC | Age: 59
End: 2019-03-14

## 2019-03-14 ENCOUNTER — TELEPHONE (OUTPATIENT)
Dept: RHEUMATOLOGY | Facility: CLINIC | Age: 59
End: 2019-03-14

## 2019-03-14 VITALS
BODY MASS INDEX: 45.75 KG/M2 | HEART RATE: 82 BPM | HEIGHT: 60 IN | WEIGHT: 233 LBS | TEMPERATURE: 97.1 F | SYSTOLIC BLOOD PRESSURE: 118 MMHG | DIASTOLIC BLOOD PRESSURE: 66 MMHG | OXYGEN SATURATION: 95 %

## 2019-03-14 DIAGNOSIS — M25.512 CHRONIC PAIN OF BOTH SHOULDERS: ICD-10-CM

## 2019-03-14 DIAGNOSIS — E11.3213 TYPE 2 DIABETES MELLITUS WITH BOTH EYES AFFECTED BY MILD NONPROLIFERATIVE RETINOPATHY AND MACULAR EDEMA, WITH LONG-TERM CURRENT USE OF INSULIN (H): ICD-10-CM

## 2019-03-14 DIAGNOSIS — G89.29 CHRONIC PAIN OF BOTH SHOULDERS: ICD-10-CM

## 2019-03-14 DIAGNOSIS — M25.511 CHRONIC PAIN OF BOTH SHOULDERS: ICD-10-CM

## 2019-03-14 DIAGNOSIS — M25.50 POLYARTHRALGIA: Primary | ICD-10-CM

## 2019-03-14 DIAGNOSIS — Z79.4 TYPE 2 DIABETES MELLITUS WITH BOTH EYES AFFECTED BY MILD NONPROLIFERATIVE RETINOPATHY AND MACULAR EDEMA, WITH LONG-TERM CURRENT USE OF INSULIN (H): ICD-10-CM

## 2019-03-14 LAB — ERYTHROCYTE [SEDIMENTATION RATE] IN BLOOD BY WESTERGREN METHOD: 39 MM/H (ref 0–30)

## 2019-03-14 PROCEDURE — 86235 NUCLEAR ANTIGEN ANTIBODY: CPT | Performed by: INTERNAL MEDICINE

## 2019-03-14 PROCEDURE — 86225 DNA ANTIBODY NATIVE: CPT | Performed by: INTERNAL MEDICINE

## 2019-03-14 PROCEDURE — 86160 COMPLEMENT ANTIGEN: CPT | Performed by: INTERNAL MEDICINE

## 2019-03-14 PROCEDURE — G0463 HOSPITAL OUTPT CLINIC VISIT: HCPCS | Mod: ZF

## 2019-03-14 PROCEDURE — 82657 ENZYME CELL ACTIVITY: CPT | Mod: 90 | Performed by: INTERNAL MEDICINE

## 2019-03-14 PROCEDURE — 99000 SPECIMEN HANDLING OFFICE-LAB: CPT | Performed by: INTERNAL MEDICINE

## 2019-03-14 PROCEDURE — 85652 RBC SED RATE AUTOMATED: CPT | Performed by: INTERNAL MEDICINE

## 2019-03-14 PROCEDURE — 92134 CPTRZ OPH DX IMG PST SGM RTA: CPT | Mod: ZF | Performed by: OPHTHALMOLOGY

## 2019-03-14 PROCEDURE — 99204 OFFICE O/P NEW MOD 45 MIN: CPT | Performed by: INTERNAL MEDICINE

## 2019-03-14 PROCEDURE — 36415 COLL VENOUS BLD VENIPUNCTURE: CPT | Performed by: INTERNAL MEDICINE

## 2019-03-14 RX ORDER — HYDROXYCHLOROQUINE SULFATE 200 MG/1
200 TABLET, FILM COATED ORAL DAILY
Qty: 60 TABLET | Refills: 3 | Status: SHIPPED | OUTPATIENT
Start: 2019-03-14 | End: 2019-03-21

## 2019-03-14 RX ORDER — PREDNISONE 5 MG/1
TABLET ORAL
Qty: 90 TABLET | Refills: 1 | Status: SHIPPED | OUTPATIENT
Start: 2019-03-14 | End: 2019-04-09

## 2019-03-14 ASSESSMENT — CONF VISUAL FIELD
OS_NORMAL: 1
OD_NORMAL: 1

## 2019-03-14 ASSESSMENT — VISUAL ACUITY
METHOD: SNELLEN - LINEAR
OS_SC+: -2
OD_SC: 20/30
OS_SC: 20/40

## 2019-03-14 ASSESSMENT — TONOMETRY
OD_IOP_MMHG: 20
OS_IOP_MMHG: 18
IOP_METHOD: TONOPEN

## 2019-03-14 ASSESSMENT — CUP TO DISC RATIO
OD_RATIO: 0.3
OS_RATIO: 0.3

## 2019-03-14 ASSESSMENT — EXTERNAL EXAM - RIGHT EYE: OD_EXAM: NORMAL

## 2019-03-14 ASSESSMENT — SLIT LAMP EXAM - LIDS
COMMENTS: NORMAL
COMMENTS: NORMAL

## 2019-03-14 ASSESSMENT — ROUTINE ASSESSMENT OF PATIENT INDEX DATA (RAPID3)
TOTAL RAPID3 SCORE: 12.7
RAPID3 INTERPRETATION: HIGH > 12.0

## 2019-03-14 ASSESSMENT — EXTERNAL EXAM - LEFT EYE: OS_EXAM: NORMAL

## 2019-03-14 ASSESSMENT — MIFFLIN-ST. JEOR: SCORE: 1558.38

## 2019-03-14 NOTE — PROGRESS NOTES
I have confirmed the patient's CC, HPI and reviewed Past Medical History, Past Surgical History, Social History, Family History, Problem List, Medication List and agree with Tech note.    CC: Follow up Diabetes mellitus retinopathy    Interval history: VA seems blurrier both eyes. S/p focal laser OS with Dr. Payan at last visit 4/25/18. Marcy flashes or new floaters.    HPI: 57YO F Hx of DMII here for diabetic exam. DMII x 13 years. On oral and insulin. Last hgA1c 6.2% in 5/22/18. Glucose under more control. Hoping to get cataract surgery with Dr. Diaz now.    OCT Macula 6/18/18  OD: center involving intra-retinal fluid temporal macula with slight interval worsening, no subretinal fluid, exudates  OS: temporal intraretinal fluid surrounding an MA    Assessment/plan   1. 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 Payan 4/25/18 for MA and circinate exudates, exudates improving   - DME improving   - recheck in 6 months       3. Myopia OS with secondary amblyopia              Does not wear glasses normally     4.  Pseudophakia both eyes    - Mild PCO in both eyes    - Monitor    Return 6 months for exam and OCT OU         Complete documentation of historical and exam elements from today's encounter can be found in the full encounter summary report (not reduplicated in this progress note).  I personally obtained the chief complaint(s) and history of present illness.  I confirmed and edited as necessary the review of systems, past medical/surgical history, family history, social history, and examination findings as documented by others; and I examined the patient myself.  I personally reviewed the relevant tests, images, and reports as documented above.  I formulated and edited as necessary the assessment and plan and discussed the findings and management plan  with the patient and family    Isaac Payan MD PhD  Vitreoretinal Surgery Fellow  [facility]

## 2019-03-14 NOTE — NURSING NOTE
Chief Complaint   Patient presents with     Establish Care     Referral       Initial /66   Pulse 82   Temp 97.1  F (36.2  C) (Temporal)   Ht 1.524 m (5')   Wt 105.7 kg (233 lb)   LMP 01/06/2015   SpO2 95%   BMI 45.50 kg/m   Estimated body mass index is 45.5 kg/m  as calculated from the following:    Height as of this encounter: 1.524 m (5').    Weight as of this encounter: 105.7 kg (233 lb).  Medication Reconciliation: complete    Have you ever seen a rheumatologist NO Who NA When NA  Joint pain history  Onset: Patient is here to establish care for multiple site pain in both shoulders and arms. Onset started in November 2018 and is continuous. Since starting on Prednisone pain has decreased.   Involved joints: both shoulders arms  Pain scale:  6/10 , since starting prednisone  Wakes the patient from sleep : Yes  Morning stiffness: Yes for 180 minutes  Meds used:tylenol, prednisone     Interim history  Since last visit:  1. Infections - No  2. New symptoms/medical problem - No  3. Any side effects from Rheum medications -NA  3. ER visits/Hospitalizations/surgeries - No  4. Last PCP visit: 2/27/19  Wt Readings from Last 4 Encounters:   03/14/19 105.7 kg (233 lb)   02/27/19 110.2 kg (243 lb)   02/08/19 108.9 kg (240 lb)   01/30/19 108.6 kg (239 lb 8 oz)     BP Readings from Last 3 Encounters:   03/14/19 118/66   02/27/19 120/68   02/08/19 128/72

## 2019-03-14 NOTE — NURSING NOTE
Chief Complaints and History of Present Illnesses   Patient presents with     Diabetic Eye Exam Follow Up     Chief Complaint(s) and History of Present Illness(es)     Diabetic Eye Exam Follow Up     Vision: fluctuates with blood sugars    Associated symptoms: blurred vision    Diabetes Type: Type 2    Blood Sugars: fluctuates              Comments     Type 2 diabetes mellitus with both eyes affected by mild nonproliferative retinopathy and macular edema, with long-term current use of insulin  Blood sugar 131 this am, up and down due to meds for shoulder  A1C 6.4 taken 12/20/18  Kylah Brugess COA 3:34 PM March 14, 2019

## 2019-03-14 NOTE — TELEPHONE ENCOUNTER
"\"Please cancel plaquenil rx sent today   Received: Today   Message Contents   Eladio Kwong MD  P [facility]\"        I called [facility] and spoke to Tejal to confirm cancellation on Plaquenil per Dr. Kwong.    Sherly Tsai, CMA    "

## 2019-03-15 LAB
C3 SERPL-MCNC: 140 MG/DL (ref 76–169)
C4 SERPL-MCNC: 35 MG/DL (ref 15–50)
DSDNA AB SER-ACNC: <1 IU/ML

## 2019-03-17 LAB
ENA RNP IGG SER IA-ACNC: <0.2 AI (ref 0–0.9)
ENA SCL70 IGG SER IA-ACNC: <0.2 AI (ref 0–0.9)
ENA SM IGG SER-ACNC: <0.2 AI (ref 0–0.9)
ENA SS-A IGG SER IA-ACNC: <0.2 AI (ref 0–0.9)
ENA SS-B IGG SER IA-ACNC: <0.2 AI (ref 0–0.9)

## 2019-03-18 ENCOUNTER — OFFICE VISIT (OUTPATIENT)
Dept: FAMILY MEDICINE | Facility: CLINIC | Age: 59
End: 2019-03-18
Payer: MEDICARE

## 2019-03-18 VITALS
HEART RATE: 82 BPM | SYSTOLIC BLOOD PRESSURE: 122 MMHG | TEMPERATURE: 98.3 F | DIASTOLIC BLOOD PRESSURE: 60 MMHG | OXYGEN SATURATION: 96 % | BODY MASS INDEX: 46.33 KG/M2 | WEIGHT: 236 LBS | HEIGHT: 60 IN | RESPIRATION RATE: 16 BRPM

## 2019-03-18 DIAGNOSIS — E78.5 HYPERLIPIDEMIA LDL GOAL <100: ICD-10-CM

## 2019-03-18 DIAGNOSIS — M25.512 CHRONIC PAIN OF BOTH SHOULDERS: Primary | ICD-10-CM

## 2019-03-18 DIAGNOSIS — I10 HTN, GOAL BELOW 140/90: ICD-10-CM

## 2019-03-18 DIAGNOSIS — M25.511 CHRONIC PAIN OF BOTH SHOULDERS: Primary | ICD-10-CM

## 2019-03-18 DIAGNOSIS — G89.29 CHRONIC PAIN OF BOTH SHOULDERS: Primary | ICD-10-CM

## 2019-03-18 DIAGNOSIS — N18.30 TYPE 2 DIABETES MELLITUS WITH STAGE 3 CHRONIC KIDNEY DISEASE, WITH LONG-TERM CURRENT USE OF INSULIN (H): ICD-10-CM

## 2019-03-18 DIAGNOSIS — Z79.4 TYPE 2 DIABETES MELLITUS WITH STAGE 3 CHRONIC KIDNEY DISEASE, WITH LONG-TERM CURRENT USE OF INSULIN (H): ICD-10-CM

## 2019-03-18 DIAGNOSIS — R04.0 BLEEDING FROM THE NOSE: ICD-10-CM

## 2019-03-18 DIAGNOSIS — F25.1 SCHIZOAFFECTIVE DISORDER, DEPRESSIVE TYPE (H): ICD-10-CM

## 2019-03-18 DIAGNOSIS — E11.22 TYPE 2 DIABETES MELLITUS WITH STAGE 3 CHRONIC KIDNEY DISEASE, WITH LONG-TERM CURRENT USE OF INSULIN (H): ICD-10-CM

## 2019-03-18 DIAGNOSIS — R05.9 COUGH: ICD-10-CM

## 2019-03-18 DIAGNOSIS — R09.81 NASAL CONGESTION: ICD-10-CM

## 2019-03-18 LAB — TPMT BLD-CCNC: 22.4 U/ML (ref 24–44)

## 2019-03-18 PROCEDURE — 99214 OFFICE O/P EST MOD 30 MIN: CPT | Performed by: FAMILY MEDICINE

## 2019-03-18 RX ORDER — GUAIFENESIN 600 MG/1
1200 TABLET, EXTENDED RELEASE ORAL 2 TIMES DAILY PRN
Qty: 30 TABLET | Refills: 0 | Status: SHIPPED | OUTPATIENT
Start: 2019-03-18 | End: 2019-04-05

## 2019-03-18 ASSESSMENT — MIFFLIN-ST. JEOR: SCORE: 1571.99

## 2019-03-18 ASSESSMENT — PAIN SCALES - GENERAL: PAINLEVEL: NO PAIN (0)

## 2019-03-18 NOTE — PATIENT INSTRUCTIONS
Thank you for coming in today!      Here is a brief summary of the plan we discussed:  1.  Continue current meds.    2.  Blood sugars actually look OK despite prednisone.  Follow-up with rheumatology regarding shoulder pain and prednisone taper.  3.  I'm sorry you are not feeling well.  Your lungs are clear and oxygen level looks good.  I recommend using a humidifier at night.  We can try mucinex for your cough.  Apply thin layer of Vaseline to inside of nostrils twice daily.  If symptoms are not improving or worsen, please come back to see us.      Follow-up in 4 weeks with me or Antonia.  I look forward to seeing you back in clinic!

## 2019-03-18 NOTE — PROGRESS NOTES
SUBJECTIVE:                                                    Nena Tang is a 59 year old female who presents to clinic today for the following health issues:  [facility]: none    Here for follow-up.  She was last seen by me about 3 weeks ago.      1.  Bilateral shoulder pain, AIDA positive, elevated ESR:  - going on since November  - failed trial of meloxicam  - given pain and elevated ESR I did ultimately decide to give her a trial of prednisone 20mg which initially seemed to help some (telephone RN note) but she states that it really has not helped much as she still struggles with activities such as dressing herself  - she was seen by rheumatology last week and additional labs are pending, steroid taper was intiated and she will follow-up with rheumatology in a few weeks    2.  Complains of nasal congestion, sore throat, and productive cough for 2 weeks.  No ear pain.  Some mild dizziness.  No nausea or vomiting.  Did have some loose stools a few days ago but attributes to taking stool softener (bowels returned to normal now).  No fevers but occasional chills.  Eating and drinking well.  Still getting out to drop in center daily.    3.  Nose bleeds for past few weeks when she blows her nose.  Was using flonase but stopped due to bleeding which has helped.       4.  Diabetes Follow-up      Patient is checking blood sugars: several times per day    Diabetic concerns: None     Symptoms of hypoglycemia (low blood sugar): denies     Paresthesias (numbness or burning in feet) or sores: No     Date of last diabetic eye exam:  UTD  03/2019    She is inquiring about a glucose sensor - has a friend with one and is interested in this.  She complains that fingers are sore due to checking QID.      I have reviewed her glucose log.    Morning/fasting 113-293  Before lunch   Before dinner 122-388  Before bed 108-432  Higher readings in evenings after missing noon/lunch doses.    Diabetes [employer]    5.   Hyperlipidemia Follow-Up      Rate your low fat/cholesterol diet?: fair    Taking statin?  Yes, no muscle aches from statin    Other lipid medications/supplements?:  none    6.  Hypertension Follow-up      Outpatient blood pressures are not being checked.    Low Salt Diet: no added salt     BP Readings from Last 2 Encounters:   03/18/19 122/60   03/14/19 118/66     Hemoglobin A1C (%)   Date Value   12/20/2018 6.4 (H)   08/06/2018 6.4 (H)     LDL Cholesterol Calculated (mg/dL)   Date Value   08/06/2018 84   06/27/2017 64       7.  Mental Health Follow up - schizoaffective disorder    Status since last visit: up and down due to pain and her sister being ill    Complicating factors: none  Significant life event:  No   Current substance abuse:  None  Anxiety or Panic symptoms:  No    Some SI a few weeks - talked with therapist and a friend, feeling better now.      Sleep - not great - she tried using her CPAP again but it didn't go well  Appetite - good  Exercise - on her feet a lot    PHQ-9  English PHQ-9   Any Language             Problems taking medications regularly: No    Medication side effects: none    Diet: diabetic    Social History     Tobacco Use     Smoking status: Never Smoker     Smokeless tobacco: Never Used   Substance Use Topics     Alcohol use: No     Comment: last month        Problem list and histories reviewed & adjusted, as indicated.  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 (H)     Illiterate     Irritable bowel syndrome     overweight - BMI >35     Takotsubo cardiomyopathy     CAD (coronary artery disease)     Restless legs syndrome (RLS)     CINDY (obstructive sleep apnea)- mild AHI 10.3     Verbal auditory hallucination     Chronic low back pain     Schizoaffective disorder,  depressive type (H)     Migraine headache     HTN, goal below 140/90     Health Care Home     Lumbago     Cervicalgia     Cocaine abuse, episodic (H)     Suicidal ideation     Esophageal reflux     Mild nonproliferative diabetic retinopathy (H)     Tardive dyskinesia     Alcohol use     Left cataract     Falls frequently     History of uterine cancer     Psychophysiological insomnia     Dysuria     Asymptomatic postmenopausal status     Abdominal pain, right lower quadrant     Infectious encephalopathy     Non-intractable vomiting with nausea     Thoughts of self harm     Gastroenteritis     Posttraumatic stress disorder     Cervical cancer screening     Type 2 diabetes mellitus with stage 3 chronic kidney disease, with long-term current use of insulin (H)     Positive AIDA (antinuclear antibody)     Past Surgical History:   Procedure Laterality Date     C OOPHORECTORMY FOR RAHEL, W/BX  1983    UTERINE     CATARACT IOL, RT/LT Bilateral 2017     COLONOSCOPY N/A 3/16/2017    Procedure: COLONOSCOPY;  Surgeon: Traci Gonzalez MD;  Location: [facility]     Coronary CTA  5/21/2014     HYSTERECTOMY  1983    uterine cancer yearly pap's per provider.     LAPAROSCOPIC CHOLECYSTECTOMY  2008     PHACOEMULSIFICATION CLEAR CORNEA WITH STANDARD INTRAOCULAR LENS IMPLANT Left 5/5/2017    Procedure: PHACOEMULSIFICATION CLEAR CORNEA WITH STANDARD INTRAOCULAR LENS IMPLANT;  LEFT EYE PHACOEMULSIFICATION CLEAR CORNEA WITH STANDARD INTRAOCULAR LENS IMPLANT ;  Surgeon: Tyra Diaz MD;  Location:  [facility]     PHACOEMULSIFICATION CLEAR CORNEA WITH STANDARD INTRAOCULAR LENS IMPLANT Right 6/30/2017    Procedure: PHACOEMULSIFICATION CLEAR CORNEA WITH STANDARD INTRAOCULAR LENS IMPLANT;  RIGHT EYE PHACOEMULSIFICATION CLEAR CORNEA WITH STANDARD INTRAOCULAR LENS IMPLANT;  Surgeon: Tyra Diaz MD;  Location:  [facility]     RELEASE TRIGGER FINGER  10/11/2012    Left thumb. Procedure: RELEASE TRIGGER FINGER;  LEFT THUMB TRIGGER RELEASE;   Surgeon: Tay Langley MD;  Location:  [facility]     RELEASE TRIGGER FINGER Right 2016    Procedure: RELEASE TRIGGER FINGER;  Surgeon: Albino Castañeda MD;  Location:  [facility]       Social History     Tobacco Use     Smoking status: Never Smoker     Smokeless tobacco: Never Used   Substance Use Topics     Alcohol use: No     Comment: last month     Family History   Problem Relation Age of Onset     Cancer Mother         BLADDER     Respiratory Mother         COPD     Gastrointestinal Disease Mother         CIRRHOSIS OF LI BOLIVAR     Alcohol/Drug Mother      Diabetes Mother      Hypertension Mother      Lipids Mother      C.A.D. Mother      Glaucoma Mother      Alcohol/Drug Sister      Mental Illness Sister      Alcohol/Drug Sister      Psychotic Disorder Sister      Cancer Maternal Grandmother         UNKNOWN TYPE     Cancer Brother         COLON     Cancer - colorectal Brother         IN HIS LATE 30S     Alcohol/Drug Brother          OF HEROIN OVERDOSE AT AGE 22 YRS     Macular Degeneration No family hx of            Current Outpatient Medications   Medication Sig Dispense Refill     acetaminophen (TYLENOL) 500 MG tablet Take 2 tablets (1,000 mg) by mouth 3 times daily as needed for mild pain 100 tablet 3     albuterol (PROAIR HFA/PROVENTIL HFA/VENTOLIN HFA) 108 (90 Base) MCG/ACT Inhaler Inhale 2 puffs into the lungs every 6 hours as needed for shortness of breath / dyspnea or wheezing 1 Inhaler 0     aspirin (ASA) 81 MG EC tablet TAKE 1 TABLET (81MG) BY MOUTH DAILY 30 tablet 3     atorvastatin (LIPITOR) 80 MG tablet TAKE 1 TABLET (80MG) BY MOUTH DAILY 30 tablet 11     benztropine (COGENTIN) 0.5 MG tablet Take 2 tablets (1 mg) by mouth 2 times daily 120 tablet 2     blood glucose (NO BRAND SPECIFIED) test strip Use to test blood sugar  4 times daily or as directed. To accompany: Blood Glucose Monitor Brands: per insurance. 100 strip 11     calcium carbonate (OS-ALLEN 600 MG [place]. [place]) 1500 (600 CA) MG tablet  Take 1 tablet (1,500 mg) by mouth daily 180 tablet 3     cholecalciferol (VITAMIN D3) 23853 units (1250 mcg) capsule TAKE 1 CAPSULE (50,000 UNITS) MONTHLY 4 capsule 3     diclofenac (VOLTAREN) 1 % topical gel Place 4 g onto the skin 4 times daily as needed for moderate pain 1 Tube 1     dulaglutide (TRULICITY) 1.5 MG/0.5ML pen Inject 1.5 mg Subcutaneous every 7 days 2 mL 3     fluticasone (FLONASE) 50 MCG/ACT spray Spray 1-2 sprays into both nostrils daily 1 Bottle 11     gabapentin (NEURONTIN) 300 MG capsule TAKE 2 CAPSULES (600MG) BY MOUTH THREE TIMES A  capsule 1     glucose 4 G CHEW chewable tablet Take 2 every 15 minutes for blood sugar <70mg/dL. Recheck blood sugar every 15 minutes until above 70mg/dL, then eat a substantial meal. 20 tablet 1     guaiFENesin (ROBITUSSIN) 20 mg/mL SOLN solution Take 10 mLs by mouth every 6 hours as needed for cough 236 mL 0     hydroxychloroquine (PLAQUENIL) 200 MG tablet Take 1 tablet (200 mg) by mouth daily Annual Plaquenil toxicity eye screening required. 60 tablet 3     insulin aspart (NOVOLOG FLEXPEN) 100 UNIT/ML pen Inject 20 Units Subcutaneous 3 times daily (with meals) Once daily, can add additional 5 units if BGs are >500mg/dL. 15 mL 11     insulin glargine (LANTUS SOLOSTAR) 100 UNIT/ML pen Inject 48 Units Subcutaneous At Bedtime 3 mL 11     insulin pen needle (NOVOFINE 30) 30G X 8 MM USE 4 DAILY OR AS DIRECTED 400 each 0     ipratropium - albuterol 0.5 mg/2.5 mg/3 mL (DUONEB) 0.5-2.5 (3) MG/3ML neb solution Take 1 vial (3 mLs) by nebulization every 6 hours as needed for shortness of breath / dyspnea or wheezing 30 vial 0     losartan (COZAAR) 50 MG tablet Take 1 tablet (50 mg) by mouth daily 90 tablet 3     melatonin 5 MG CAPS Take 2 capsules by mouth At Bedtime 180 capsule 3     metoprolol succinate (TOPROL-XL) 25 MG 24 hr tablet Take 1 tablet (25 mg) by mouth daily 90 tablet 1     paliperidone ER (INVEGA) 9 MG 24 hr tablet Take 1 tablet (9 mg) by mouth every  morning 30 tablet 2     polyethylene glycol (MIRALAX/GLYCOLAX) powder TAKE 8.5 GRAMS (1/2 CAPFUL) BY MOUTH DAILY 527 g 1     predniSONE (DELTASONE) 5 MG tablet 15mg PO daily X 2 weeks; then 10mg Po daily. 90 tablet 1     ranitidine (ZANTAC) 300 MG tablet TAKE 1 TABLET (300MG) BY MOUTH AT BEDTIME 90 tablet 1     senna-docusate (SENOKOT-S;PERICOLACE) 8.6-50 MG per tablet Take 1 tablet by mouth 2 times daily as needed for constipation 100 tablet 1     sertraline (ZOLOFT) 100 MG tablet Take 2 tablets (200 mg) by mouth daily 60 tablet 2     spacer (OPTICHAMBER PATRICIA) holding chamber Holding/spacer device to use with inhaler. 1 each 0     SUMAtriptan (IMITREX) 25 MG tablet Take 1-2 tablets (25-50 mg) by mouth at onset of headache for migraine May repeat in 2 hours. Max 8 tablets/24 hours. 12 tablet 1     topiramate (TOPAMAX) 25 MG tablet Take 3 tablets (75 mg) by mouth 2 times daily 180 tablet 3     Allergies   Allergen Reactions     Imidazole Antifungals Hives     Tolerates diflucan     Ketoprofen Itching     Pruritis to topical     Lisinopril Hives     Metformin Other (See Comments)     Patient hospitalized for lactic acidosis - admitting provider suspectd caused by metformin     Metronidazole Hives     Posaconazole Hives     Tolerates diflucan     Recent Labs   Lab Test 02/27/19  1218 01/30/19  1215 12/20/18  1532  10/17/18  1531 08/06/18  1038  05/28/18  1625 05/22/18  1434  11/07/17  0801  06/27/17  1358  07/13/16  1350  07/14/15  1215   A1C  --   --  6.4*  --   --  6.4*  --   --  6.2*   < > 8.9*   < > 7.0*   < >  --    < > 9.1*   LDL  --   --   --   --   --  84  --   --   --   --   --   --  64  --  137*  --  78   HDL  --   --   --   --   --  46*  --   --   --   --   --   --  37*  --   --   --  39*   TRIG  --   --   --   --   --  215*  --   --   --   --   --   --  267*  --   --   --  151*   ALT  --  20  --   --  23  --   --  27  --    < > 23   < > 32   < >  --    < >  --    CR 0.93 1.31* 0.99   < > 1.04  --    < >  1.16*  --    < > 0.88   < > 0.78   < >  --    < > 0.67   GFRESTIMATED 68 45* 63   < > 54*  --    < > 48*  --    < > 66   < > 76   < >  --    < > >90  Non  GFR Calc     GFRESTBLACK 79 52* 73   < > 66  --    < > 58*  --    < > 80   < > >90   GFR Calc     < >  --    < > >90   GFR Calc     POTASSIUM 4.4 4.7 4.2   < > 4.3  --    < > 4.5  --    < > 3.9   < > 4.3   < >  --    < > 4.3   TSH  --   --  1.98  --   --   --   --   --   --   --  3.21  --   --    < >  --    < >  --     < > = values in this interval not displayed.      BP Readings from Last 3 Encounters:   03/18/19 122/60   03/14/19 118/66   02/27/19 120/68    Wt Readings from Last 3 Encounters:   03/18/19 107 kg (236 lb)   03/14/19 105.7 kg (233 lb)   02/27/19 110.2 kg (243 lb)        Labs reviewed in EPIC  Problem list, Medication list, Allergies, and Medical/Social/Surgical histories reviewed in [facility] and updated as appropriate.     ROS: Constitutional, neuro, ENT, endocrine, pulmonary, cardiac, gastrointestinal, genitourinary, musculoskeletal, integument and psychiatric systems are negative, except as otherwise noted above in the HPI.       OBJECTIVE:                                                    /60   Pulse 82   Temp 98.3  F (36.8  C) (Oral)   Resp 16   Ht 1.524 m (5')   Wt 107 kg (236 lb)   LMP 01/06/2015   SpO2 96%   BMI 46.09 kg/m    Body mass index is 46.09 kg/m .  GENERAL: healthy, alert, well nourished, well hydrated, no distress  EYES: Eyes grossly normal to inspection, extraocular movements - intact, and PERR, no conjunctival injection or scleral icterus, watery discharge bilaterallyL  HENT: ear canals- normal; TMs- normal (no erythema or effusion); Nose- significant erythema and swelling of bilateral nasal turbinates; Mouth- no tonsillar swelling or exudates but she does have posterior pharyngeal erythema  NECK: no tenderness, no adenopathy, no asymmetry, no masses, no stiffness;  thyroid- normal to palpation  RESP: lungs clear to auscultation - no rales, no rhonchi, no wheezes  CV: regular rates and rhythm, no murmur  MS: still with decreased AROM and PROM of bilateral shoulders with tenderness of shoulders  SKIN: no suspicious lesions, no rashes  NEURO: alert and oriented, no focal deficits, no tremor, normal gait, able to get herself up and down off exam table    Mental Status Assessment:  Appearance:   Appropriate   Eye Contact:   Good   Psychomotor Behavior: Normal   Attitude:   Cooperative   Orientation:   All  Speech   Rate / Production: Monotone    Volume:  Normal   Mood:    Depressed   Affect:    Flat   Thought Content:  Clear   Thought Form:  Coherent  Logical   Insight:    Fair   See [facility] notes    Diagnostic Test Results:  none      ASSESSMENT/PLAN:                                                      1. Chronic pain of both shoulders  She will continue follow-up with rheumatology and prednisone taper.    2. Nasal congestion  3. Cough  Suspect viral URI and cough 2/2 post-nasal drip.  Her vitals are stable and lungs are clear.  Will trial mucinex to thin secretions.  Advised she may also use albuterol to see if this provides relief.  Return if worsening symptoms.  - guaiFENesin (MUCINEX) 600 MG 12 hr tablet; Take 2 tablets (1,200 mg) by mouth 2 times daily as needed for congestion or cough  Dispense: 30 tablet; Refill: 0    4. Bleeding from the nose  Suspect she has irritation of her nasal mucosa due to dry air and now viral URI.  Recommend humidifier and Vaseline to the inside of her nose.  If bleeds persist then may need to consider ENT consult.    5. Type 2 diabetes mellitus with stage 3 chronic kidney disease, with long-term current use of insulin (H)  Stable.  I am happy that she has not had a significant increase in her blood glucose readings being on prednisone - the highs correlate directly with missed insulin doses.  She will continue to monitor.  I will discuss with MTDELROY  about     6. Hyperlipidemia LDL goal <100  Continue statin.      7. HTN, goal below 140/90  At goal.  Continue current meds.    8. Schizoaffective disorder, depressive type (H)  Stable.  Support provided.  She will continue to see her therapist at the [facility] in center and work with [facility] and psychiatry here.      She will follow-up with me in 4 weeks, sooner if needed.  She expressed agreement and understanding with plan as above.      Rebecca Carr, DO  [facility] PRIMARY CARE

## 2019-03-20 ENCOUNTER — TELEPHONE (OUTPATIENT)
Dept: FAMILY MEDICINE | Facility: CLINIC | Age: 59
End: 2019-03-20

## 2019-03-20 ENCOUNTER — TELEPHONE (OUTPATIENT)
Dept: NURSING | Facility: CLINIC | Age: 59
End: 2019-03-20

## 2019-03-20 ENCOUNTER — MYC MEDICAL ADVICE (OUTPATIENT)
Dept: RHEUMATOLOGY | Facility: CLINIC | Age: 59
End: 2019-03-20

## 2019-03-20 DIAGNOSIS — M25.50 POLYARTHRALGIA: Primary | ICD-10-CM

## 2019-03-20 DIAGNOSIS — E11.22 TYPE 2 DIABETES MELLITUS WITH STAGE 3 CHRONIC KIDNEY DISEASE, WITH LONG-TERM CURRENT USE OF INSULIN (H): Primary | ICD-10-CM

## 2019-03-20 DIAGNOSIS — Z79.4 TYPE 2 DIABETES MELLITUS WITH STAGE 3 CHRONIC KIDNEY DISEASE, WITH LONG-TERM CURRENT USE OF INSULIN (H): Primary | ICD-10-CM

## 2019-03-20 DIAGNOSIS — N18.30 TYPE 2 DIABETES MELLITUS WITH STAGE 3 CHRONIC KIDNEY DISEASE, WITH LONG-TERM CURRENT USE OF INSULIN (H): Primary | ICD-10-CM

## 2019-03-20 RX ORDER — FLASH GLUCOSE SENSOR
1 KIT MISCELLANEOUS
Qty: 6 EACH | Refills: 3 | Status: SHIPPED | OUTPATIENT
Start: 2019-03-20 | End: 2019-04-03

## 2019-03-20 RX ORDER — FLASH GLUCOSE SCANNING READER
1 EACH MISCELLANEOUS 4 TIMES DAILY
Qty: 1 DEVICE | Refills: 0 | Status: SHIPPED | OUTPATIENT
Start: 2019-03-20 | End: 2019-04-03

## 2019-03-20 NOTE — TELEPHONE ENCOUNTER
Information relayed to pt, she is very excited to get the sensor/reader! She will bring all of her supplies to [facility] 4/18/19.  Wendy Schwartz RN

## 2019-03-20 NOTE — TELEPHONE ENCOUNTER
Alva, caregiver from [facility], calling with question.  She is inquiring about patient's recent lab results and if patient should be starting Azathioprine?  Please see results and advise.

## 2019-03-20 NOTE — TELEPHONE ENCOUNTER
Please call patient and let her know I spoke with Eugenia our [place] pharmacist about her interest in the glucose sensor.      I will go ahead and order the sensor and reader for her today.  She should bring everything with her to her visit with me and Eugenia that is already scheduled for 4/18.      Rebecca Carr, DO on 3/20/2019 at 9:17 AM

## 2019-03-21 NOTE — TELEPHONE ENCOUNTER
Call to patient-patient asking to send in a my-chart message to her [id number] came as a MC message.  MC sent back with the information below.  Olga Lidia Sharma RN  Message handled by Nurse Triage.

## 2019-03-21 NOTE — TELEPHONE ENCOUNTER
Yes, stop plaquenil.   I have removed it from the list.   TPMT enzyme is not normal. So we cannot use azathioprine.   I will recommend a different medication when I am back in clinic next week.   Please let patient know.

## 2019-03-22 NOTE — TELEPHONE ENCOUNTER
Recommendations given to Alva at pt home . Verbalized that pt never started Plaquenil . Will await further recommendations from DR. Kwong.Loreta Garcia RN

## 2019-03-26 RX ORDER — LEFLUNOMIDE 20 MG/1
20 TABLET ORAL DAILY
Qty: 60 TABLET | Refills: 0 | Status: SHIPPED | OUTPATIENT
Start: 2019-03-26 | End: 2019-05-08

## 2019-03-26 NOTE — TELEPHONE ENCOUNTER
I recommend trying leflunomide. Side effect risk would be overall comparable with azathioprine which was initially planned. I can explain more when we meet in few weeks. I have sent the rx to pharmacy if patient wants to get started on this medication now. It takes few weeks before this med starts working.

## 2019-03-26 NOTE — TELEPHONE ENCOUNTER
No answer on both [phone] & Alva's number [phone].     Sent [employer] message with information below.

## 2019-03-27 ENCOUNTER — TELEPHONE (OUTPATIENT)
Dept: FAMILY MEDICINE | Facility: CLINIC | Age: 59
End: 2019-03-27

## 2019-03-27 DIAGNOSIS — N18.30 TYPE 2 DIABETES MELLITUS WITH STAGE 3 CHRONIC KIDNEY DISEASE, WITH LONG-TERM CURRENT USE OF INSULIN (H): Primary | ICD-10-CM

## 2019-03-27 DIAGNOSIS — E11.22 TYPE 2 DIABETES MELLITUS WITH STAGE 3 CHRONIC KIDNEY DISEASE, WITH LONG-TERM CURRENT USE OF INSULIN (H): Primary | ICD-10-CM

## 2019-03-27 DIAGNOSIS — Z79.4 TYPE 2 DIABETES MELLITUS WITH STAGE 3 CHRONIC KIDNEY DISEASE, WITH LONG-TERM CURRENT USE OF INSULIN (H): Primary | ICD-10-CM

## 2019-03-27 NOTE — TELEPHONE ENCOUNTER
Prior Authorization Retail Medication Request    Medication/Dose:   ICD code (if different than what is on RX):    Previously Tried and Failed:    Rationale:      Insurance Name:  [phone]  Insurance ID:  [id number]      Pharmacy Information (if different than what is on RX)  Name:  [facility]  Phone:  [phone]

## 2019-03-28 ENCOUNTER — TELEPHONE (OUTPATIENT)
Dept: NURSING | Facility: CLINIC | Age: 59
End: 2019-03-28

## 2019-03-28 NOTE — RESULT ENCOUNTER NOTE
Results released to [facility]:  TPMT enzyme not normal.   Lupus activity labs are all normal.    Some of the specific antibodies for lupus, mixed connective tissue disease, scleroderma, sjogrens syndrome are normal (negative)  Sed rate is improving. Good.         Sincerely    Eladio Kwong MD  [facility]

## 2019-03-28 NOTE — TELEPHONE ENCOUNTER
Group home staff calling, says patient was put on Prednisone for pain management pending outcome of labs. They got a call from the pharmacy that patient is starting on new med leflunomide and are wondering if patient is still supposed to continue the prednisone? Also, will Dr. Kwong review plans for labs needed while on leflunomide and anything else at upcoming appt?    Call back [facility] [phone]

## 2019-03-29 NOTE — TELEPHONE ENCOUNTER
I called and left a voicemail for Zheng informing him of Dr. Kwong's response below. Advised to call back if still further questions.    Sherly Tsai, CMA

## 2019-03-29 NOTE — TELEPHONE ENCOUNTER
Patient's caregiver has not read the above message yet.   No changes in plan for prednisone. Same plan as we discussed last visit.

## 2019-03-30 ENCOUNTER — HOSPITAL ENCOUNTER (EMERGENCY)
Facility: CLINIC | Age: 59
Discharge: HOME | End: 2019-03-30
Attending: EMERGENCY MEDICINE | Admitting: EMERGENCY MEDICINE
Payer: MEDICARE

## 2019-03-30 ENCOUNTER — APPOINTMENT (OUTPATIENT)
Dept: GENERAL RADIOLOGY | Facility: CLINIC | Age: 59
End: 2019-03-30
Attending: EMERGENCY MEDICINE
Payer: MEDICARE

## 2019-03-30 VITALS
HEIGHT: 60 IN | HEART RATE: 80 BPM | RESPIRATION RATE: 16 BRPM | BODY MASS INDEX: 46.09 KG/M2 | TEMPERATURE: 98.3 F | OXYGEN SATURATION: 93 % | SYSTOLIC BLOOD PRESSURE: 133 MMHG | DIASTOLIC BLOOD PRESSURE: 83 MMHG

## 2019-03-30 DIAGNOSIS — R07.9 CHEST PAIN, UNSPECIFIED TYPE: ICD-10-CM

## 2019-03-30 DIAGNOSIS — N28.9 RENAL INSUFFICIENCY: ICD-10-CM

## 2019-03-30 DIAGNOSIS — R42 DIZZINESS: ICD-10-CM

## 2019-03-30 LAB
ANION GAP SERPL CALCULATED.3IONS-SCNC: 4 MMOL/L (ref 3–14)
BUN SERPL-MCNC: 25 MG/DL (ref 7–30)
CALCIUM SERPL-MCNC: 9 MG/DL (ref 8.5–10.1)
CHLORIDE SERPL-SCNC: 109 MMOL/L (ref 94–109)
CO2 SERPL-SCNC: 26 MMOL/L (ref 20–32)
CREAT SERPL-MCNC: 1.28 MG/DL (ref 0.52–1.04)
ERYTHROCYTE [DISTWIDTH] IN BLOOD BY AUTOMATED COUNT: 14.2 % (ref 10–15)
GFR SERPL CREATININE-BSD FRML MDRD: 46 ML/MIN/{1.73_M2}
GLUCOSE SERPL-MCNC: 135 MG/DL (ref 70–99)
HCT VFR BLD AUTO: 33.6 % (ref 35–47)
HGB BLD-MCNC: 10.3 G/DL (ref 11.7–15.7)
MCH RBC QN AUTO: 31.1 PG (ref 26.5–33)
MCHC RBC AUTO-ENTMCNC: 30.7 G/DL (ref 31.5–36.5)
MCV RBC AUTO: 102 FL (ref 78–100)
PLATELET # BLD AUTO: 195 10E9/L (ref 150–450)
POTASSIUM SERPL-SCNC: 4.8 MMOL/L (ref 3.4–5.3)
RBC # BLD AUTO: 3.31 10E12/L (ref 3.8–5.2)
SODIUM SERPL-SCNC: 139 MMOL/L (ref 133–144)
TROPONIN I SERPL-MCNC: <0.015 UG/L (ref 0–0.04)
WBC # BLD AUTO: 9 10E9/L (ref 4–11)

## 2019-03-30 PROCEDURE — 93005 ELECTROCARDIOGRAM TRACING: CPT

## 2019-03-30 PROCEDURE — 84484 ASSAY OF TROPONIN QUANT: CPT | Performed by: EMERGENCY MEDICINE

## 2019-03-30 PROCEDURE — 85027 COMPLETE CBC AUTOMATED: CPT | Performed by: EMERGENCY MEDICINE

## 2019-03-30 PROCEDURE — 25000128 H RX IP 250 OP 636: Performed by: EMERGENCY MEDICINE

## 2019-03-30 PROCEDURE — 80048 BASIC METABOLIC PNL TOTAL CA: CPT | Performed by: EMERGENCY MEDICINE

## 2019-03-30 PROCEDURE — 96361 HYDRATE IV INFUSION ADD-ON: CPT

## 2019-03-30 PROCEDURE — 99285 EMERGENCY DEPT VISIT HI MDM: CPT | Mod: 25

## 2019-03-30 PROCEDURE — 71046 X-RAY EXAM CHEST 2 VIEWS: CPT

## 2019-03-30 PROCEDURE — 96360 HYDRATION IV INFUSION INIT: CPT

## 2019-03-30 RX ORDER — FLUTICASONE PROPIONATE 50 MCG
1 SPRAY, SUSPENSION (ML) NASAL DAILY
COMMUNITY
End: 2019-04-05

## 2019-03-30 RX ADMIN — SODIUM CHLORIDE 1000 ML: 9 INJECTION, SOLUTION INTRAVENOUS at 17:07

## 2019-03-30 ASSESSMENT — ENCOUNTER SYMPTOMS
APPETITE CHANGE: 0
SHORTNESS OF BREATH: 0
BLOOD IN STOOL: 0
DIZZINESS: 1
MYALGIAS: 1
VOMITING: 0
DIARRHEA: 0

## 2019-03-30 NOTE — ED AVS SNAPSHOT
[facility] Emergency Department  [address]  [facility] [address]  Phone:  [phone]  Fax:  [phone]                                    Nena Tang   MRN: [id number]    Department:  [facility] Emergency Department   Date of Visit:  3/30/2019           After Visit Summary Signature Page    I have received my discharge instructions, and my questions have been answered. I have discussed any challenges I see with this plan with the nurse or doctor.    ..........................................................................................................................................  Patient/Patient Representative Signature      ..........................................................................................................................................  Patient Representative Print Name and Relationship to Patient    ..................................................               ................................................  Date                                   Time    ..........................................................................................................................................  Reviewed by Signature/Title    ...................................................              ..............................................  Date                                               Time          22EPIC Rev 08/18

## 2019-03-30 NOTE — ED PROVIDER NOTES
History     Chief Complaint:  Dizziness    The history is provided by the patient.      Nena Tang is a 59 year old female with a history of heart disease, HTN, hyperlipidemia, and diabetes mellitus who presents to the emergency department via EMS for evaluation of dizziness. To note, the patient is coming from a group home.  A few hours ago while sitting down, the patient started to feel dizzy, stating she felt like she was going to pass out.  She states she was unable to get up and notes she was also having body aches. She denies any chest pain or shortness of breath at that time but does states she had a few hours of chest pain earlier in the morning starting around 0800. This chest pain felt like gas. She told group home staff about her symptoms and they called EMS so the patient could be evaluated here in the emergency department. To note, staff reports that the patient has complained of symptoms like today's before. EMS also state the patient's blood pressures sitting where in the 90s and when lying down, pressures were 110. Blood sugar was 210.    Here, the patient continues to be dizzy but she denies any ringing in her ears or hearing loss. She has been eating and drinking normally recently and she has been without vomiting, diarrhea, black or bloody stools, or coughing up bloody. The patient also is complaining of bilateral shoulder pain that has been present for the past couple of weeks. Pain worsens with movement. She has been seen by orthopedics for this pain and     Cardiac/PE/DVT Risk Factors:  The patient has a history of heart disease, hypertension, hyperlipidemia, and diabetes but no smoking. She reports a family history of hypertension, hyperlipidemia, diabetes, and cancer. The patient denies any personal or familial history of PE, DVT, or clotting disorder. The patient reports denies recent travel, surgery, or other immobilizations. She has a history of uterine cancer years ago. No  estrogen usage.       Allergies:  Imidazole Antifungals  Ketoprofen  Lisinopril  Metformin  Metronidazole  Posaconazole     Medications:    Albuterol  Aspirin 81 mg  Benztropine  Vitamin D  Diclofenac  Gabapentin  Mucinex  Insulin aspart  Insulin gargine  Duo neb  Melatonin  Losartan  Topiramate  Imitrex  Sertraline  Senna-docusate  Zantac  Deltason  Miralax  Melatonin  Metoprolol  Ingeva    Past Medical History:    CAD  Positive AIDA  Type 2 diabetes  Osteopenia   Pneumonia  Cataracts  PTSD  Infectious encephalopathy  Psychophysiological insomnia  Uterine Cancer  Tardive Dyskinesia  Mild nonproliferative diabetic retinopathy  GERD  Cocaine abuse, episode   Suicidal ideation  THN  Migraines   Schizoaffective disorder, depressive type  CINDY  Illiterate  Rotator cuff syndrome  Hyperlipidemia  IBS   Overweight  Vitamin B Deficiency  Takotsubo cardiomyopathy  Restless leg syndrome    Past Surgical History:    Oophorectomy  Bilateral cataract surgery  Colonoscopy  Hysterectomy  Coronary CTA  cholecystectomy  Left intraocular lens implant  Right intraocular lens implant  Release trigger finger, right and left fingers    Family History:    Bladder cancer  COPD  GI disease  Liver cirrhosis  Alcohol / Drug abuse  CAD: Mother  Hyperlipidemia  HTN  diabetes mellitus  Psychotic disorder  Mental illness  Glaucoma  Colorectal cancer  Colon cancer  Cancer    Social History:  Negative for tobacco use.  Negative for alcohol use.  Negative for drug use.  Patient resides in a group home.  Marital Status:        Review of Systems   Constitutional: Negative for appetite change.   HENT: Negative for hearing loss.    Respiratory: Negative for shortness of breath.    Cardiovascular: Positive for chest pain.   Gastrointestinal: Negative for blood in stool, diarrhea and vomiting.   Musculoskeletal: Positive for myalgias.        Positive for bilateral shoulder pain.    Neurological: Positive for dizziness.   All other systems reviewed  and are negative.    Physical Exam     Patient Vitals for the past 24 hrs:   BP Temp Temp src Pulse Heart Rate Resp SpO2 Height   03/30/19 1930 133/83 -- -- 80 81 16 93 % --   03/30/19 1900 131/70 -- -- 80 79 16 95 % --   03/30/19 1800 136/72 -- -- 80 79 15 94 % --   03/30/19 1730 148/74 -- -- 84 -- -- 100 % --   03/30/19 1705 117/70 -- -- 82 84 24 97 % --   03/30/19 1700 142/75 -- -- -- 80 12 94 % --   03/30/19 1645 125/69 -- -- 80 -- -- 95 % --   03/30/19 1634 118/57 98.3  F (36.8  C) Oral 80 -- 16 95 % 1.524 m (5')     Orthostatic vitals at 1702:  Lying BP: 125/68 HR: 80  Sitting BP: 142/75 HR: 82  Standing BP: 117/70 HR: 82    Physical Exam    Constitutional:  Pleasant, age appropriate.      Resting comfortably in the bed.  HEENT:    Tympanic membranes are clear and without effusion.     External auditory canals without cerumen impaction.     Oropharynx is moist, without lesions or trismus.  Eyes:    Conjunctiva normal, PERRL     Extra-ocular movements intact.     No nystagmus  Neck:    Supple, no meningismus.     CV:     Regular rate and rhythm.      No murmurs, rubs or gallops.       2+ radial pulses bilateral.       No lower extremity edema.  PULM:    Clear to auscultation bilateral.       No respiratory distress.      Good air exchange.  ABD:    Soft, non-tender, non-distended.       No pulsatile masses.       No rebound, guarding or rigidity.  MSK:     No gross deformity to all four extremities.   LYMPH:   No cervical lymphadenopathy.  NEURO:   Alert and oriented x 3.      Cranial nerves II-XII intact.     Strength is 5/5 in all 4 extremities.     Sensation intact to all 4 extremities.     Finger to nose normal bilateral     No clonus, down-going Babinski bilateral.     Test of skew normal.  Skin:    Warm, dry and intact.    Psych:    Mood is good and affect is appropriate.      Emergency Department Course   Indication: dizziness  Time: 1644  Vent. Rate 80 bpm. DC interval 170. QRS duration 86. QT/QTc  368/424. P-R-T axis 47 -51 -8. Normal sinus rhythm. Left axis deviation. Septal infarct, age undetermined. Abnormal ECG. Agrees with computer interpretation. No significant change from prior ECG on 2019. Read time: 1648.    Imaging:  Radiographic findings were communicated with the patient who voiced understanding of the findings.    XR Chest, PA & LAT:  Cardiomegaly is unchanged. Otherwise normal. As per radiology.     Laboratory:  1659 Troponin: <0.015  CBC: WBC: 9.0, HGB: 10.3 (L), PLT: 195  BMP: Glucose 135 (H), Creatinine 1.28 (H), GFR 53 (L), o/w WNL    Interventions:  1707 NS 1L IV    Emergency Department Course:  1700 Nursing notes and vitals reviewed.  I performed an exam of the patient as documented above.     IV inserted. Medicine administered as documented above. Blood drawn. This was sent to the lab for further testing, results above.    EKG obtained in the ED, see results above.     The patient was placed on continuous pulse oximetry and cardiac monitoring while here in the ED.      The patient was sent for a chest xray while in the emergency department, findings above.     1845 I rechecked the patient and discussed the results of her workup thus far.     Findings and plan explained to the Patient. Patient discharged home with instructions regarding supportive care, medications, and reasons to return. The importance of close follow-up was reviewed.     I personally reviewed the laboratory results with the Patient and answered all related questions prior to discharge.   Impression & Plan    Medical Decision Makin-year-old female seen in the ED with primary complaints of dizziness described as near syncope upon standing.  With she did not have orthostatic vital signs in the ED but did have recurrence of symptoms.  EKG reveals no evidence of dysrhythmia.  She has no evidence of acute anemia.  She has mild renal insufficiency which may indicate some degree of intravascular volume depletion.   She improved with IV fluids.  She has no features concerning for vertigo central or peripheral in nature.  Patient is safe for discharge home with continued oral rehydration and close follow-up with PCP.    Patient later divulged that she had mild transient chest pain this morning.  She is now been pain-free.  EKG reveals no ischemic changes.  Troponin is within normal limits despite chest pain initiating greater than 8 hours prior to arrival.  She has no features concerning for pulmonary embolus, aortic dissection or aortic aneurysm.  Patient safe for discharge home with close follow-up.    Diagnosis:    ICD-10-CM    1. Dizziness R42    2. Renal insufficiency N28.9    3. Chest pain, unspecified type R07.9        Disposition:  discharged to group home    Scribe Disclosure:  I, Ora Bridges, am serving as a scribe on 3/30/2019 at 5:22 PM to personally document services performed by Sivakumar Callejas MD based on my observations and the provider's statements to me.     Ora Bridges  3/30/2019   [facility] EMERGENCY DEPARTMENT       Sivakumar Callejas MD  03/30/19 1190

## 2019-03-30 NOTE — ED TRIAGE NOTES
Pt presents via EMS for evaluation of dizziness with body aches over the last few hrs. Pt also notes midsternal chest pain earlier today that last for a few hrs. Lives in a group home. Has had dizzy spells in the past where she came in to the ED and received IV fluids and was sent home. .

## 2019-03-31 LAB — INTERPRETATION ECG - MUSE: NORMAL

## 2019-03-31 NOTE — ED NOTES
Group home staff member came and picked pt up. Discussed follow up instructions with staff member as well.

## 2019-03-31 NOTE — DISCHARGE INSTRUCTIONS
Your kidney tests were abnormal today and is likely due to dehydration.  Please follow-up with your regular doctor to have this rechecked in 3-4 days.    Discharge Instructions  Dizziness (Lightheaded)  Today you were seen for dizziness.  Dizziness can be caused by many things and it can be very difficult to determine the cause of dizziness.  At this time, your provider has found no signs that your dizziness is due to a serious or life-threatening condition. However, sometimes there is a serious problem that does not show up right away, and it is important for you to follow up with your regular provider as instructed.  Generally, every Emergency Department visit should have a follow-up clinic visit with either a primary or a specialty clinic/provider. Please follow-up as instructed by your emergency provider today.      Return to the Emergency Department if:    You pass out (fainting or falling out), especially during exercise.    You develop chest pain, chest pressure or difficulty breathing.  Your feel an irregular heartbeat.  You have excessive vaginal bleeding, or blood in your stool or vomit (throw up).  You have a high fever.  Your symptoms get worse or more frequent.    If when you begin to feel dizzy or lightheaded, it is important to sit down or lay down immediately to prevent injury from falling.  If you were given a prescription for medicine here today, be sure to read all of the information (including the package insert) that comes with your prescription.  This will include important information about the medicine, its side effects, and any warnings that you need to know about.  The pharmacist who fills the prescription can provide more information and answer questions you may have about the medicine.  If you have questions or concerns that the pharmacist cannot address, please call or return to the Emergency Department.   Remember that you can always come back to the Emergency Department if you are not  able to see your regular provider in the amount of time listed above, if you get any new symptoms, or if there is anything that worries you.  Discharge Instructions  Chest Pain    You have been seen today for chest pain or discomfort.  At this time, your provider has found no signs that your chest pain is due to a serious or life-threatening condition, (or you have declined more testing and/or admission to the hospital). However, sometimes there is a serious problem that does not show up right away. Your evaluation today may not be complete and you may need further testing and evaluation.     Generally, every Emergency Department visit should have a follow-up clinic visit with either a primary or a specialty clinic/provider. Please follow-up as instructed by your emergency provider today.  Return to the Emergency Department if:  Your chest pain changes, gets worse, starts to happen more often, or comes with less activity.  You are newly short of breath.  You get very weak or tired.  You pass out or faint.  You have any new symptoms, like fever, cough, numb legs, or you cough up blood.  You have anything else that worries you.    Until you follow-up with your regular provider, please do the following:  Take one aspirin daily unless you have an allergy or are told not to by your provider.  If a stress test appointment has been made, go to the appointment.  If you have questions, contact your regular provider.  Follow-up with your regular provider/clinic as directed; this is very important.    If you were given a prescription for medicine here today, be sure to read all of the information (including the package insert) that comes with your prescription.  This will include important information about the medicine, its side effects, and any warnings that you need to know about.  The pharmacist who fills the prescription can provide more information and answer questions you may have about the medicine.  If you have  questions or concerns that the pharmacist cannot address, please call or return to the Emergency Department.       Remember that you can always come back to the Emergency Department if you are not able to see your regular provider in the amount of time listed above, if you get any new symptoms, or if there is anything that worries you.

## 2019-03-31 NOTE — ED NOTES
Spoke with Adri at patient's group home and advised of patient status and plan to return to home.

## 2019-04-02 NOTE — TELEPHONE ENCOUNTER
PA not needed.  This should be ordered as DME.  Patient is able to receive this at any DME supplier.  [facility] is unable to process.

## 2019-04-02 NOTE — PROGRESS NOTES
[place] - Rheumatology Clinic Visit     Nena Tang MRN# [id number]   YOB: 1960    Primary care provider: Rowena Haas  Apr 9, 2019          Assessment and Plan:   # Steroid responsive polyarthralgia - onset 2018  # Positive AIDA 1:160 centromere pattern  # Anemia   # Elevated sed rate  # Osteopenia  # Coronary artery disease; h/o takotsuba cardiomyopathy  # Type 2 diabetes mellitus  # H/o uterine cancer - decades ago  # Past h/o suicidal ideation; past h/o cocaine/heroin use  # Retinopathy due to Diabetes mellitus    Creat, AST, ALT within normal limits   CK within normal limits  RF, CCP neg.   TSH within normal limits  Vit D within normal limits    TPMT enzyme not normal.   Lupus activity labs are all normal.     Some of the specific antibodies for lupus, mixed connective tissue disease, scleroderma, sjogrens syndrome are normal (negative)   Sed rate is improving. Good.     There is some suspicion for autoimmune connective tissue disorder. No classic synovitis.     Polyarthralgia still uncontrolled on Prednisone 10 mg PO daily started 2/19 by Dr. Carr. Tapered down to 20mg PO daily and pain is worse with the taper.     Meloxicam did not help.     We will avoid plaquenil because of retinopathy. Avoid azathioprine because of abnormal TPMT.   Leflunomide started about 2 weeks ago. Tolerating okay. Continue 20 mg PO daily.   Labs every month X 3.     Referral to sports medicine for shoulder assessment for non-autoimmune causes of pain.     The labs from patient records are reviewed.     I will be back in touch with the patient through mychart/letter when results are available.     Patient agrees with the above mentioned treatment plan.     Most Recent Immunizations   Administered Date(s) Administered     Influenza (IIV3) PF 09/24/2012     Influenza Vaccine IM 3yrs+ 4 Valent IIV4 11/06/2017     Mantoux Tuberculin Skin Test 07/10/2014     Pneumococcal 23 valent 01/22/2009     TDAP Vaccine  (Adacel) 01/22/2009       Orders Placed This Encounter   Procedures     CBC with platelets     AST     ALT     Creatinine     Erythrocyte sedimentation rate auto     Hepatitis B surface antigen     SPORTS MEDICINE REFERRAL       Data Unavailable    Medications Discontinued During This Encounter   Medication Reason     predniSONE (DELTASONE) 5 MG tablet      predniSONE (DELTASONE) 5 MG tablet Reorder     Current Outpatient Medications   Medication Sig Dispense Refill     acetaminophen (TYLENOL) 500 MG tablet Take 2 tablets (1,000 mg) by mouth 3 times daily as needed for mild pain 100 tablet 3     albuterol (PROAIR HFA/PROVENTIL HFA/VENTOLIN HFA) 108 (90 Base) MCG/ACT Inhaler Inhale 2 puffs into the lungs every 6 hours as needed for shortness of breath / dyspnea or wheezing 1 Inhaler 0     aspirin (ASA) 81 MG EC tablet TAKE 1 TABLET (81MG) BY MOUTH DAILY 30 tablet 3     atorvastatin (LIPITOR) 80 MG tablet TAKE 1 TABLET (80MG) BY MOUTH DAILY 30 tablet 11     benztropine (COGENTIN) 0.5 MG tablet Take 2 tablets (1 mg) by mouth 2 times daily 120 tablet 2     blood glucose (NO BRAND SPECIFIED) test strip Use to test blood sugar  4 times daily or as directed. To accompany: Blood Glucose Monitor Brands: per insurance. 100 strip 11     calcium carbonate (OS-ALLEN 600 MG [place]. [place]) 1500 (600 [place]) MG tablet Take 1 tablet (1,500 mg) by mouth daily 180 tablet 3     cholecalciferol (VITAMIN D3) 84339 units (1250 mcg) capsule TAKE 1 CAPSULE (50,000 UNITS) MONTHLY 4 capsule 3     Continuous Blood Gluc  (FREESTYLE LEBRON 14 DAY READER) RO 1 Device 3 times daily For continuous glucose monitoring. 1 Device 0     Continuous Blood Gluc Sensor (FREESTYLE LEBRON 14 DAY SENSOR) MISC 1 Device every 14 days 6 each 3     diclofenac (VOLTAREN) 1 % topical gel Place 4 g onto the skin 4 times daily as needed for moderate pain 1 Tube 1     dulaglutide (TRULICITY) 1.5 MG/0.5ML pen Inject 1.5 mg Subcutaneous every 7 days 2 mL 3     gabapentin  (NEURONTIN) 300 MG capsule TAKE 2 CAPSULES (600MG) BY MOUTH IN THE MORNING AND AFTERNOON; 3 CAPSULES AT BEDTIME. 210 capsule 1     glucose 4 G CHEW chewable tablet Take 2 every 15 minutes for blood sugar <70mg/dL. Recheck blood sugar every 15 minutes until above 70mg/dL, then eat a substantial meal. 20 tablet 1     insulin aspart (NOVOLOG FLEXPEN) 100 UNIT/ML pen Inject 20 Units Subcutaneous 3 times daily (with meals) Once daily, can add additional 5 units if BGs are >500mg/dL. 15 mL 11     insulin glargine (LANTUS SOLOSTAR) 100 UNIT/ML pen Inject 48 Units Subcutaneous At Bedtime 3 mL 11     insulin pen needle (NOVOFINE 30) 30G X 8 MM miscellaneous USE 4 DAILY OR AS DIRECTED 400 each 0     ipratropium - albuterol 0.5 mg/2.5 mg/3 mL (DUONEB) 0.5-2.5 (3) MG/3ML neb solution Take 1 vial (3 mLs) by nebulization every 6 hours as needed for shortness of breath / dyspnea or wheezing 30 vial 0     leflunomide (ARAVA) 20 MG tablet Take 1 tablet (20 mg) by mouth daily Labs every 8-12 weeks 60 tablet 0     losartan (COZAAR) 50 MG tablet Take 1 tablet (50 mg) by mouth daily 90 tablet 3     melatonin 5 MG CAPS Take 2 capsules by mouth At Bedtime 180 capsule 3     metoprolol succinate (TOPROL-XL) 25 MG 24 hr tablet Take 1 tablet (25 mg) by mouth daily 90 tablet 1     order for DME Equipment being ordered: Freestyle Gabby [place] 1 Device 0     paliperidone ER (INVEGA) 9 MG 24 hr tablet Take 1 tablet (9 mg) by mouth every morning 30 tablet 2     polyethylene glycol (MIRALAX/GLYCOLAX) powder TAKE 8.5 GRAMS (1/2 CAPFUL) BY MOUTH DAILY 527 g 1     predniSONE (DELTASONE) 5 MG tablet April 9, 2019: 12.5 mg PO daily. 150 tablet 0     ranitidine (ZANTAC) 300 MG tablet TAKE 1 TABLET (300MG) BY MOUTH AT BEDTIME 90 tablet 1     senna-docusate (SENOKOT-S;PERICOLACE) 8.6-50 MG per tablet Take 1 tablet by mouth 2 times daily as needed for constipation 100 tablet 1     sertraline (ZOLOFT) 100 MG tablet Take 2 tablets (200 mg) by mouth daily 60  tablet 2     spacer (OPTICHAMBER PATRICIA) holding chamber Holding/spacer device to use with inhaler. 1 each 0     SUMAtriptan (IMITREX) 25 MG tablet Take 1-2 tablets (25-50 mg) by mouth at onset of headache for migraine May repeat in 2 hours. Max 8 tablets/24 hours. 12 tablet 1     topiramate (TOPAMAX) 25 MG tablet Take 3 tablets (75 mg) by mouth 2 times daily 180 tablet 3       Eladio Kwong MD  [facility]          Active Problem List:     Patient Active Problem List    Diagnosis Date Noted     CAD (coronary artery disease) 02/29/2012     Priority: High      Tako-Tsubo stress cardiomyopathy 2009. Mild coronary artery disease,        Positive AIDA (antinuclear antibody) 01/31/2019     Priority: Medium     Type 2 diabetes mellitus with stage 3 chronic kidney disease, with long-term current use of insulin (H) 06/05/2018     Priority: Medium     Cervical cancer screening 06/01/2018     Priority: Medium     Pt age 58  05/22/18: She had a total hysterectomy 35 years ago for uterine cancer, NIL pap, Neg HR HPV result. Plan cease pap screening per provider.  No history of MEHREEN 2 or greater found in epic.   No further cervical cancer screening recommended.   Hm updated.              Posttraumatic stress disorder 01/29/2018     Priority: Medium     Gastroenteritis 12/08/2017     Priority: Medium     Thoughts of self harm 10/23/2017     Priority: Medium     Infectious encephalopathy 09/04/2017     Priority: Medium     Non-intractable vomiting with nausea 09/04/2017     Priority: Medium     Abdominal pain, right lower quadrant 07/13/2017     Priority: Medium     Asymptomatic postmenopausal status 06/28/2017     Priority: Medium     Dysuria 06/12/2017     Priority: Medium     Psychophysiological insomnia 03/09/2017     Priority: Medium     History of uterine cancer 12/07/2016     Priority: Medium     Yearly pap's per the provider office visit note on 12/07/16.  05/22/18: She had a total hysterectomy 35  years ago for uterine cancer, NIL pap, Neg HR HPV result. Plan cease pap screening per provider.       Falls frequently 08/18/2016     Priority: Medium     Left cataract 07/25/2016     Priority: Medium     Alcohol use 04/19/2016     Priority: Medium     Tardive dyskinesia 09/11/2015     Priority: Medium     Mild nonproliferative diabetic retinopathy (H) 06/19/2014     Priority: Medium     Problem list name updated by automated process. Provider to review       Esophageal reflux 06/09/2014     Priority: Medium     Suicidal ideation 05/01/2014     Priority: Medium     Cocaine abuse, episodic (H) 10/03/2013     Priority: Medium     Lumbago 09/20/2013     Priority: Medium     Cervicalgia 09/20/2013     Priority: Medium     Health Care Home 08/16/2013     Priority: Medium     EMERGENCY CARE PLAN  Presenting Problem Signs and Symptoms Treatment Plan    Questions or concerns during clinic hours    I will call the clinic directly     Questions or concerns outside clinic hours    I will call the 24 hour nurse line at [phone]    Patient needs to schedule an appointment    I will call the 24 hour scheduling team at [phone] or clinic directly    Same day treatment     I will call the clinic first, nurse line if after hours, urgent care and express care if needed     Primary Care Provider Dr. [facility] [phone]  Nurse Care Coordinator Idalmis Nettles [phone]        HTN, goal below 140/90 07/29/2013     Priority: Medium     Migraine headache 04/22/2013     Priority: Medium     Schizoaffective disorder, depressive type (H) 02/25/2013     Priority: Medium     Chronic low back pain 01/22/2013     Priority: Medium     Verbal auditory hallucination 10/04/2012     Priority: Medium     CINDY (obstructive sleep apnea)- mild AHI 10.3 03/08/2012     Priority: Medium     Sleep study 3/12 (198#)-   AHI 10.3, with significant desaturations down to 74%. RDI 10.3. Periodic Limb Movement Index  3.2/hour.         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 Echo 2010, angio 2011 with normal LVF.        Vitamin B12 deficiency without anemia 11/23/2009     Priority: Low     Diagnosis updated by automated process. Provider to review and confirm.       Osteopenia 10/07/2009     Priority: Low     Dexa 2009- Lumbar Spine (L1-L4): T-score -1.8, Left Femoral Neck: T-score -1.0, Right Femoral Neck: T-score -1.0                   History of Present Illness:     Chief Complaint   Patient presents with     RECHECK       March 13, 2019  Have you ever seen a rheumatologist NO Who NA When NA  Joint pain history  Onset: Patient is here to establish care for multiple site pain in both shoulders and arms. Onset started in November 2018 and is continuous. Since starting on Prednisone pain has decreased.   Involved joints: both shoulders arms  Pain scale:  6/10 , since starting prednisone  Wakes the patient from sleep : Yes  Morning stiffness: Yes for 180 minutes  Meds used:tylenol, prednisone      Interim history  Since last visit:  1. Infections - No  2. New symptoms/medical problem - No  3. Any side effects from Rheum medications -NA  3. ER visits/Hospitalizations/surgeries - No  4. Last PCP visit: 2/27/19      Wt Readings from Last 4 Encounters:   04/09/19 108.4 kg (239 lb)   04/05/19 108.6 kg (239 lb 8 oz)   03/18/19 107 kg (236 lb)   03/14/19 105.7 kg (233 lb)     Cough on and off  Epistaxis on and off; resolved after stopping flonase.   abdominal pain on and off    No h/o myalgia,  unintentional weight loss, fevers, rash, swollen glands  No h/o glaucoma.  Patient denies any raynauds, malar rash, skin photosensitivity, recurrent mouth ulcers, or arterial/venous thrombosis in the past  No h/o persistent shortness of breath,  chest pain  No h/o persistent vomiting, diarrhea,   No h/o hematochezia, hematuria, hemoptysis  No h/o seizures  No h/o sicca symptoms    April 9, 2019  Have you ever seen a rheumatologist yes Who you When 3/14/19  Joint pain history  Onset: Patient is here for a follow up. Patient feels pain in shoulders is worse than before. Pain never goes away.  Involved joints: shoulders  Pain scale:  8/10     Wakes the patient from sleep : Yes  Morning stiffness:Yes for 10 minutes  Meds used:prednisone, leflunomide, tylenol     Interim history  Since last visit:  1. Infections - No  2. New symptoms/medical problem - Yes, creatine at 1.3, kidney issues they are looking at  3. Any side effects from Rheum medications -high sugar levels from prednisone  3. ER visits/Hospitalizations/surgeries - Yes, 3/30/19-kidney insufficiency   4. Last PCP visit: 4/5/19      BP Readings from Last 3 Encounters:   04/09/19 124/68   04/05/19 124/74   03/30/19 133/83              Review of Systems:   Complete ROS negative except for symptoms mentioned in the HPI          Past Medical History:     Past Medical History:   Diagnosis Date     Acute respiratory failure with hypoxia (H) 9/4/2017     CAD (coronary artery disease)     5/2014 cath, nonbostructive stenosis to LAD, RCA.     Chronic low back pain 1/22/2013     Cocaine abuse, in remission (H)      Fecal urgency 3/8/2012     History of heroin abuse      Hyperlipidemia LDL goal <100 10/31/2010     Hypertension 7/29/2013     Illiterate 8/30/2011     Irritable bowel syndrome      Left cataract      Migraine 4/19/2012     Migraine headache 4/22/2013     Moderate major depression (H) 6/8/2011     Noncompliance with medication regimen 6/8/2011     Obesity       CINDY (obstructive sleep apnea) 3/8/2012    uses CPAP     Osteopenia 10/7/2009     Pneumonia of right lower lobe due to infectious organism (H) 9/4/2017     Schizoaffective disorder, depressive type (H) 2/25/2013     Sepsis (H) 8/29/2017     Suicidal intent 10/2/2013     Takotsubo cardiomyopathy      Type 2 diabetes mellitus (H) 8/30/2011     Uterine cancer (H) 1983     Verbal auditory hallucination 10/4/2012     Past Surgical History:   Procedure Laterality Date     C OOPHORECTORMY FOR MALIG, W/BX  1983    UTERINE     CATARACT IOL, RT/LT Bilateral 2017     COLONOSCOPY N/A 3/16/2017    Procedure: COLONOSCOPY;  Surgeon: Traci Gonzalez MD;  Location:  GI     Coronary CTA  5/21/2014     HYSTERECTOMY  1983    uterine cancer yearly pap's per provider.     LAPAROSCOPIC CHOLECYSTECTOMY  2008     PHACOEMULSIFICATION CLEAR CORNEA WITH STANDARD INTRAOCULAR LENS IMPLANT Left 5/5/2017    Procedure: PHACOEMULSIFICATION CLEAR CORNEA WITH STANDARD INTRAOCULAR LENS IMPLANT;  LEFT EYE PHACOEMULSIFICATION CLEAR CORNEA WITH STANDARD INTRAOCULAR LENS IMPLANT ;  Surgeon: Tyra Diaz MD;  [facility]     PHACOEMULSIFICATION CLEAR CORNEA WITH STANDARD INTRAOCULAR LENS IMPLANT Right 6/30/2017    Procedure: PHACOEMULSIFICATION CLEAR CORNEA WITH STANDARD INTRAOCULAR LENS IMPLANT;  RIGHT EYE PHACOEMULSIFICATION CLEAR CORNEA WITH STANDARD INTRAOCULAR LENS IMPLANT;  Surgeon: Tyra Diaz MD;  Location:  [facility]     RELEASE TRIGGER FINGER  10/11/2012    Left thumb. Procedure: RELEASE TRIGGER FINGER;  LEFT THUMB TRIGGER RELEASE;  Surgeon: Tay Langley MD;  Location:  [facility]     RELEASE TRIGGER FINGER Right 12/26/2016    Procedure: RELEASE TRIGGER FINGER;  Surgeon: Albino Castañeda MD;  Location:  OR            Social History:     Social History     Occupational History     Not on file   Tobacco Use     Smoking status: Never Smoker     Smokeless tobacco: Never Used   Substance and Sexual Activity     Alcohol use:  No     Frequency: Never     Comment: last month     Drug use: No     Comment: history of     Sexual activity: Never     Partners: Male     Birth control/protection: None, Condom            Family History:     Family History   Problem Relation Age of Onset     Cancer Mother         BLADDER     Respiratory Mother         COPD     Gastrointestinal Disease Mother         CIRRHOSIS OF LI BOLIVAR     Alcohol/Drug Mother      Diabetes Mother      Hypertension Mother      Lipids Mother      C.A.D. Mother      Glaucoma Mother      Alcohol/Drug Sister      Mental Illness Sister      Alcohol/Drug Sister      Psychotic Disorder Sister      Cancer Maternal Grandmother         UNKNOWN TYPE     Cancer Brother         COLON     Cancer - colorectal Brother         IN HIS LATE 30S     Alcohol/Drug Brother          OF HEROIN OVERDOSE AT AGE 22 YRS     Macular Degeneration No family hx of             Allergies:     Allergies   Allergen Reactions     Imidazole Antifungals Hives     Tolerates diflucan     Ketoprofen Itching     Pruritis to topical     Lisinopril Hives     Metformin Other (See Comments)     Patient hospitalized for lactic acidosis - admitting provider suspectd caused by metformin     Metronidazole Hives     Posaconazole Hives     Tolerates diflucan            Medications:     Current Outpatient Medications   Medication Sig Dispense Refill     acetaminophen (TYLENOL) 500 MG tablet Take 2 tablets (1,000 mg) by mouth 3 times daily as needed for mild pain 100 tablet 3     albuterol (PROAIR HFA/PROVENTIL HFA/VENTOLIN HFA) 108 (90 Base) MCG/ACT Inhaler Inhale 2 puffs into the lungs every 6 hours as needed for shortness of breath / dyspnea or wheezing 1 Inhaler 0     aspirin (ASA) 81 MG EC tablet TAKE 1 TABLET (81MG) BY MOUTH DAILY 30 tablet 3     atorvastatin (LIPITOR) 80 MG tablet TAKE 1 TABLET (80MG) BY MOUTH DAILY 30 tablet 11     benztropine (COGENTIN) 0.5 MG tablet Take 2 tablets (1 mg) by mouth 2 times daily 120 tablet 2      blood glucose (NO BRAND SPECIFIED) test strip Use to test blood sugar  4 times daily or as directed. To accompany: Blood Glucose Monitor Brands: per insurance. 100 strip 11     calcium carbonate (OS-ALLEN 600 MG [place]. [place]) 1500 (600 [place]) MG tablet Take 1 tablet (1,500 mg) by mouth daily 180 tablet 3     cholecalciferol (VITAMIN D3) 06857 units (1250 mcg) capsule TAKE 1 CAPSULE (50,000 UNITS) MONTHLY 4 capsule 3     Continuous Blood Gluc  (FREESTYLE LEBRON 14 DAY READER) RO 1 Device 3 times daily For continuous glucose monitoring. 1 Device 0     Continuous Blood Gluc Sensor (FREESTYLE LEBRON 14 DAY SENSOR) MISC 1 Device every 14 days 6 each 3     diclofenac (VOLTAREN) 1 % topical gel Place 4 g onto the skin 4 times daily as needed for moderate pain 1 Tube 1     dulaglutide (TRULICITY) 1.5 MG/0.5ML pen Inject 1.5 mg Subcutaneous every 7 days 2 mL 3     gabapentin (NEURONTIN) 300 MG capsule TAKE 2 CAPSULES (600MG) BY MOUTH IN THE MORNING AND AFTERNOON; 3 CAPSULES AT BEDTIME. 210 capsule 1     glucose 4 G CHEW chewable tablet Take 2 every 15 minutes for blood sugar <70mg/dL. Recheck blood sugar every 15 minutes until above 70mg/dL, then eat a substantial meal. 20 tablet 1     insulin aspart (NOVOLOG FLEXPEN) 100 UNIT/ML pen Inject 20 Units Subcutaneous 3 times daily (with meals) Once daily, can add additional 5 units if BGs are >500mg/dL. 15 mL 11     insulin glargine (LANTUS SOLOSTAR) 100 UNIT/ML pen Inject 48 Units Subcutaneous At Bedtime 3 mL 11     insulin pen needle (NOVOFINE 30) 30G X 8 MM miscellaneous USE 4 DAILY OR AS DIRECTED 400 each 0     ipratropium - albuterol 0.5 mg/2.5 mg/3 mL (DUONEB) 0.5-2.5 (3) MG/3ML neb solution Take 1 vial (3 mLs) by nebulization every 6 hours as needed for shortness of breath / dyspnea or wheezing 30 vial 0     leflunomide (ARAVA) 20 MG tablet Take 1 tablet (20 mg) by mouth daily Labs every 8-12 weeks 60 tablet 0     losartan (COZAAR) 50 MG tablet Take 1 tablet (50 mg) by  mouth daily 90 tablet 3     melatonin 5 MG CAPS Take 2 capsules by mouth At Bedtime 180 capsule 3     metoprolol succinate (TOPROL-XL) 25 MG 24 hr tablet Take 1 tablet (25 mg) by mouth daily 90 tablet 1     order for DME Equipment being ordered: Freestyle Gbaby [place] 1 Device 0     paliperidone ER (INVEGA) 9 MG 24 hr tablet Take 1 tablet (9 mg) by mouth every morning 30 tablet 2     polyethylene glycol (MIRALAX/GLYCOLAX) powder TAKE 8.5 GRAMS (1/2 CAPFUL) BY MOUTH DAILY 527 g 1     predniSONE (DELTASONE) 5 MG tablet April 9, 2019: 12.5 mg PO daily. 150 tablet 0     ranitidine (ZANTAC) 300 MG tablet TAKE 1 TABLET (300MG) BY MOUTH AT BEDTIME 90 tablet 1     senna-docusate (SENOKOT-S;PERICOLACE) 8.6-50 MG per tablet Take 1 tablet by mouth 2 times daily as needed for constipation 100 tablet 1     sertraline (ZOLOFT) 100 MG tablet Take 2 tablets (200 mg) by mouth daily 60 tablet 2     spacer (OPTICHAMBER PATRICIA) holding chamber Holding/spacer device to use with inhaler. 1 each 0     SUMAtriptan (IMITREX) 25 MG tablet Take 1-2 tablets (25-50 mg) by mouth at onset of headache for migraine May repeat in 2 hours. Max 8 tablets/24 hours. 12 tablet 1     topiramate (TOPAMAX) 25 MG tablet Take 3 tablets (75 mg) by mouth 2 times daily 180 tablet 3            Physical Exam:   Blood pressure 124/68, pulse 86, temperature 97.1  F (36.2  C), temperature source Temporal, height 1.524 m (5'), weight 108.4 kg (239 lb), last menstrual period 01/06/2015, SpO2 94 %, not currently breastfeeding.  Wt Readings from Last 4 Encounters:   04/09/19 108.4 kg (239 lb)   04/05/19 108.6 kg (239 lb 8 oz)   03/18/19 107 kg (236 lb)   03/14/19 105.7 kg (233 lb)       Constitutional: well-developed, appearing stated age; cooperative  MS: All elbow, wrist, MCP/PIP/DIP, hip, knee, ankle were examined and  found without active synovitis.shoulder abduction painful beyond 90 degrees.   Skin: no rash in exposed areas  Psych: nl judgement, orientation,  memory, affect.         Data:         Eladio Kwong MD    [facility]

## 2019-04-03 ENCOUNTER — TELEPHONE (OUTPATIENT)
Dept: FAMILY MEDICINE | Facility: CLINIC | Age: 59
End: 2019-04-03

## 2019-04-03 DIAGNOSIS — Z79.4 TYPE 2 DIABETES MELLITUS WITH STAGE 3 CHRONIC KIDNEY DISEASE, WITH LONG-TERM CURRENT USE OF INSULIN (H): ICD-10-CM

## 2019-04-03 DIAGNOSIS — N18.30 TYPE 2 DIABETES MELLITUS WITH STAGE 3 CHRONIC KIDNEY DISEASE, WITH LONG-TERM CURRENT USE OF INSULIN (H): ICD-10-CM

## 2019-04-03 DIAGNOSIS — E11.22 TYPE 2 DIABETES MELLITUS WITH STAGE 3 CHRONIC KIDNEY DISEASE, WITH LONG-TERM CURRENT USE OF INSULIN (H): ICD-10-CM

## 2019-04-03 RX ORDER — FLASH GLUCOSE SCANNING READER
1 EACH MISCELLANEOUS 3 TIMES DAILY
Qty: 1 DEVICE | Refills: 0 | Status: SHIPPED | OUTPATIENT
Start: 2019-04-03 | End: 2019-04-30

## 2019-04-03 RX ORDER — FLASH GLUCOSE SENSOR
1 KIT MISCELLANEOUS
Qty: 6 EACH | Refills: 3 | Status: SHIPPED | OUTPATIENT
Start: 2019-04-03 | End: 2019-06-04

## 2019-04-03 NOTE — TELEPHONE ENCOUNTER
Script printed and signed.  Please find out from patient where we should fax it to.      Rebecca Carr, DO on 4/3/2019 at 8:55 AM

## 2019-04-03 NOTE — TELEPHONE ENCOUNTER
Pended Rx for pharmacy to fill Freestyle Gabby monitor and sensor.  This is not a DME and should be picked up at a participating pharmacy ([facility], [facility], [facility], etc).    Eugenia De Jesus, PharmD, BCACP

## 2019-04-03 NOTE — TELEPHONE ENCOUNTER
Reason for Call:  FYI to provider    Detailed comments: Tim from [facility] called to inform Dr. Carr that [facility] does not carry any diabetic supplies and that the pt would need to go elsewhere.     Tim did attempt to reach out to pt but was unable to lvm.    [facility] tel: [phone]    Best Time: anytime    Call taken on 4/3/2019 at 11:54 AM by Sherly Villatoro

## 2019-04-05 ENCOUNTER — OFFICE VISIT (OUTPATIENT)
Dept: FAMILY MEDICINE | Facility: CLINIC | Age: 59
End: 2019-04-05
Payer: MEDICARE

## 2019-04-05 VITALS
TEMPERATURE: 97.5 F | RESPIRATION RATE: 14 BRPM | SYSTOLIC BLOOD PRESSURE: 124 MMHG | WEIGHT: 239.5 LBS | OXYGEN SATURATION: 95 % | HEIGHT: 60 IN | HEART RATE: 79 BPM | DIASTOLIC BLOOD PRESSURE: 74 MMHG | BODY MASS INDEX: 47.02 KG/M2

## 2019-04-05 DIAGNOSIS — I10 HTN, GOAL BELOW 140/90: ICD-10-CM

## 2019-04-05 DIAGNOSIS — E11.40 TYPE 2 DIABETES MELLITUS WITH DIABETIC NEUROPATHY, WITH LONG-TERM CURRENT USE OF INSULIN (H): Primary | ICD-10-CM

## 2019-04-05 DIAGNOSIS — F25.1 SCHIZOAFFECTIVE DISORDER, DEPRESSIVE TYPE (H): ICD-10-CM

## 2019-04-05 DIAGNOSIS — Z79.4 TYPE 2 DIABETES MELLITUS WITH DIABETIC NEUROPATHY, WITH LONG-TERM CURRENT USE OF INSULIN (H): Primary | ICD-10-CM

## 2019-04-05 PROCEDURE — 99214 OFFICE O/P EST MOD 30 MIN: CPT | Performed by: NURSE PRACTITIONER

## 2019-04-05 RX ORDER — GABAPENTIN 300 MG/1
CAPSULE ORAL
Qty: 210 CAPSULE | Refills: 1 | Status: ON HOLD | OUTPATIENT
Start: 2019-04-05 | End: 2019-05-15

## 2019-04-05 SDOH — HEALTH STABILITY: MENTAL HEALTH: HOW OFTEN DO YOU HAVE A DRINK CONTAINING ALCOHOL?: NEVER

## 2019-04-05 ASSESSMENT — MIFFLIN-ST. JEOR: SCORE: 1587.86

## 2019-04-05 NOTE — PROGRESS NOTES
SUBJECTIVE:                                                    Nena Tang is a 59 year old female who presents to clinic today for the following health issues:    Diabetes Follow-up  Patient is on prednisone for polyarthralgia. Scheduled to follow-up with Rheumatology for pain management and diagnosis.   Patient is checking blood sugars: 4 times daily.    Blood sugar testing frequency justification: Not controlled due to prednisone,   Results are as follows:         am - before breakfast  130-150's         lunchtime - before 143-411         suppertime - before 250-300         bedtime - 164-501    Diabetic concerns: None and blood sugar frequently over 200     Symptoms of hypoglycemia (low blood sugar): shaky, dizzy, weak     Paresthesias (numbness or burning in feet) or sores: No     Date of last diabetic eye exam: 2 weeks ago    Diabetes Management Resources    Hyperlipidemia Follow-Up  Patient has been watching what she is eating at the group home, but not when she is out and about. Encouraged patient to focus on portion control and eating a low fat diet.     Rate your low fat/cholesterol diet?: fair    Taking statin?  Yes,     Other lipid medications/supplements?:  none    Hypertension Follow-up  Patient denies any chest pain, headaches, heart palpitations or syncopal episodes. Was recently seen in the ED for dizziness that was related to dehydration.     Outpatient blood pressures are not being checked.    Low Salt Diet: regular    BP Readings from Last 2 Encounters:   03/30/19 133/83   03/18/19 122/60       ED/UC Followup:  Facility:  [facility] Emergency Department  Date of visit: 03/30/2019  Reason for visit: Dizziness  Current Status: Improved.  Discussed with patient about drinking enough water during the day.        Mental Health Follow up   Sees Liliana at the [facility] in center; twice per month for individual therapy. Patient reports that she still struggles with her sleep because of her  hallucinations even with her night light on.     Status since last visit: improved since the emergency room visit.     See PHQ-9 for current symptoms.  Other associated symptoms: None    Complicating factors: none at this time.   Significant life event:  No   Current substance abuse:  None  Anxiety or Panic symptoms:  No    Sleep - still struggling with sleep.   Appetite - good.   Exercise - not really especially with the new shoulder pain.    PHQ-9  English PHQ-9   Any Language               Problems taking medications regularly: No    Medication side effects: none    Diet: low fat/cholesterol and diabetic    Social History     Tobacco Use     Smoking status: Never Smoker     Smokeless tobacco: Never Used   Substance Use Topics     Alcohol use: No     Comment: last month        Problem list and histories reviewed & adjusted, as indicated.  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 (H)     Illiterate     Irritable bowel syndrome     overweight - BMI >35     Takotsubo cardiomyopathy     CAD (coronary artery disease)     Restless legs syndrome (RLS)     CINDY (obstructive sleep apnea)- mild AHI 10.3     Verbal auditory hallucination     Chronic low back pain     Schizoaffective disorder, depressive type (H)     Migraine headache     HTN, goal below 140/90     Health Care Home     Lumbago     Cervicalgia     Cocaine abuse, episodic (H)     Suicidal ideation     Esophageal reflux     Mild nonproliferative diabetic retinopathy (H)     Tardive dyskinesia     Alcohol use     Left cataract     Falls frequently     History of uterine cancer     Psychophysiological insomnia     Dysuria     Asymptomatic postmenopausal status     Abdominal pain, right lower quadrant     Infectious encephalopathy     Non-intractable  vomiting with nausea     Thoughts of self harm     Gastroenteritis     Posttraumatic stress disorder     Cervical cancer screening     Type 2 diabetes mellitus with stage 3 chronic kidney disease, with long-term current use of insulin (H)     Positive AIDA (antinuclear antibody)     Past Surgical History:   Procedure Laterality Date     C OOPHORECTORMY FOR RAHEL, W/BX  1983    UTERINE     CATARACT IOL, RT/LT Bilateral 2017     COLONOSCOPY N/A 3/16/2017    Procedure: COLONOSCOPY;  Surgeon: Traci Gonzalez MD;  Location:  GI     Coronary CTA  5/21/2014     HYSTERECTOMY  1983    uterine cancer yearly pap's per provider.     LAPAROSCOPIC CHOLECYSTECTOMY  2008     PHACOEMULSIFICATION CLEAR CORNEA WITH STANDARD INTRAOCULAR LENS IMPLANT Left 5/5/2017    Procedure: PHACOEMULSIFICATION CLEAR CORNEA WITH STANDARD INTRAOCULAR LENS IMPLANT;  LEFT EYE PHACOEMULSIFICATION CLEAR CORNEA WITH STANDARD INTRAOCULAR LENS IMPLANT ;  Surgeon: Tyra Diaz MD;  Location: [facility]     PHACOEMULSIFICATION CLEAR CORNEA WITH STANDARD INTRAOCULAR LENS IMPLANT Right 6/30/2017    Procedure: PHACOEMULSIFICATION CLEAR CORNEA WITH STANDARD INTRAOCULAR LENS IMPLANT;  RIGHT EYE PHACOEMULSIFICATION CLEAR CORNEA WITH STANDARD INTRAOCULAR LENS IMPLANT;  Surgeon: Tyra Diaz MD;  Location:  EC     RELEASE TRIGGER FINGER  10/11/2012    Left thumb. Procedure: RELEASE TRIGGER FINGER;  LEFT THUMB TRIGGER RELEASE;  Surgeon: Tay Langley MD;  Location:  SD     RELEASE TRIGGER FINGER Right 12/26/2016    Procedure: RELEASE TRIGGER FINGER;  Surgeon: Albino Castañeda MD;  Location:  OR       Social History     Tobacco Use     Smoking status: Never Smoker     Smokeless tobacco: Never Used   Substance Use Topics     Alcohol use: No     Comment: last month     Family History   Problem Relation Age of Onset     Cancer Mother         BLADDER     Respiratory Mother         COPD     Gastrointestinal Disease Mother         CIRRHOSIS OF  MEHUL LUTZ     Alcohol/Drug Mother      Diabetes Mother      Hypertension Mother      Lipids Mother      C.A.D. Mother      Glaucoma Mother      Alcohol/Drug Sister      Mental Illness Sister      Alcohol/Drug Sister      Psychotic Disorder Sister      Cancer Maternal Grandmother         UNKNOWN TYPE     Cancer Brother         COLON     Cancer - colorectal Brother         IN HIS LATE 30S     Alcohol/Drug Brother          OF HEROIN OVERDOSE AT AGE 22 YRS     Macular Degeneration No family hx of            Current Outpatient Medications   Medication Sig Dispense Refill     acetaminophen (TYLENOL) 500 MG tablet Take 2 tablets (1,000 mg) by mouth 3 times daily as needed for mild pain 100 tablet 3     albuterol (PROAIR HFA/PROVENTIL HFA/VENTOLIN HFA) 108 (90 Base) MCG/ACT Inhaler Inhale 2 puffs into the lungs every 6 hours as needed for shortness of breath / dyspnea or wheezing 1 Inhaler 0     aspirin (ASA) 81 MG EC tablet TAKE 1 TABLET (81MG) BY MOUTH DAILY 30 tablet 3     atorvastatin (LIPITOR) 80 MG tablet TAKE 1 TABLET (80MG) BY MOUTH DAILY 30 tablet 11     benztropine (COGENTIN) 0.5 MG tablet Take 2 tablets (1 mg) by mouth 2 times daily 120 tablet 2     blood glucose (NO BRAND SPECIFIED) test strip Use to test blood sugar  4 times daily or as directed. To accompany: Blood Glucose Monitor Brands: per insurance. 100 strip 11     calcium carbonate (OS-ALLEN 600 MG [place]. [place]) 1500 (600 CA) MG tablet Take 1 tablet (1,500 mg) by mouth daily 180 tablet 3     cholecalciferol (VITAMIN D3) 78626 units (1250 mcg) capsule TAKE 1 CAPSULE (50,000 UNITS) MONTHLY 4 capsule 3     Continuous Blood Gluc  (FREESTYLE LEBRON 14 DAY READER) RO 1 Device 3 times daily For continuous glucose monitoring. 1 Device 0     Continuous Blood Gluc Sensor (FREESTYLE LEBRON 14 DAY SENSOR) MISC 1 Device every 14 days 6 each 3     diclofenac (VOLTAREN) 1 % topical gel Place 4 g onto the skin 4 times daily as needed for moderate pain 1 Tube 1      dulaglutide (TRULICITY) 1.5 MG/0.5ML pen Inject 1.5 mg Subcutaneous every 7 days 2 mL 3     fluticasone (FLONASE) 50 MCG/ACT nasal spray Spray 1 spray into both nostrils daily       gabapentin (NEURONTIN) 300 MG capsule TAKE 2 CAPSULES (600MG) BY MOUTH THREE TIMES A  capsule 1     glucose 4 G CHEW chewable tablet Take 2 every 15 minutes for blood sugar <70mg/dL. Recheck blood sugar every 15 minutes until above 70mg/dL, then eat a substantial meal. 20 tablet 1     guaiFENesin (MUCINEX) 600 MG 12 hr tablet Take 2 tablets (1,200 mg) by mouth 2 times daily as needed for congestion or cough 30 tablet 0     guaiFENesin (ROBITUSSIN) 20 mg/mL SOLN solution Take 10 mLs by mouth every 6 hours as needed for cough 236 mL 0     insulin aspart (NOVOLOG FLEXPEN) 100 UNIT/ML pen Inject 20 Units Subcutaneous 3 times daily (with meals) Once daily, can add additional 5 units if BGs are >500mg/dL. 15 mL 11     insulin glargine (LANTUS SOLOSTAR) 100 UNIT/ML pen Inject 48 Units Subcutaneous At Bedtime 3 mL 11     insulin pen needle (NOVOFINE 30) 30G X 8 MM USE 4 DAILY OR AS DIRECTED 400 each 0     ipratropium - albuterol 0.5 mg/2.5 mg/3 mL (DUONEB) 0.5-2.5 (3) MG/3ML neb solution Take 1 vial (3 mLs) by nebulization every 6 hours as needed for shortness of breath / dyspnea or wheezing 30 vial 0     leflunomide (ARAVA) 20 MG tablet Take 1 tablet (20 mg) by mouth daily Labs every 8-12 weeks 60 tablet 0     losartan (COZAAR) 50 MG tablet Take 1 tablet (50 mg) by mouth daily 90 tablet 3     melatonin 5 MG CAPS Take 2 capsules by mouth At Bedtime 180 capsule 3     metoprolol succinate (TOPROL-XL) 25 MG 24 hr tablet Take 1 tablet (25 mg) by mouth daily 90 tablet 1     order for DME Equipment being ordered: Freestyle Gabby [place] 1 Device 0     paliperidone ER (INVEGA) 9 MG 24 hr tablet Take 1 tablet (9 mg) by mouth every morning 30 tablet 2     polyethylene glycol (MIRALAX/GLYCOLAX) powder TAKE 8.5 GRAMS (1/2 CAPFUL) BY MOUTH DAILY 527 g  1     predniSONE (DELTASONE) 5 MG tablet 15mg PO daily X 2 weeks; then 10mg Po daily. 90 tablet 1     ranitidine (ZANTAC) 300 MG tablet TAKE 1 TABLET (300MG) BY MOUTH AT BEDTIME 90 tablet 1     senna-docusate (SENOKOT-S;PERICOLACE) 8.6-50 MG per tablet Take 1 tablet by mouth 2 times daily as needed for constipation 100 tablet 1     sertraline (ZOLOFT) 100 MG tablet Take 2 tablets (200 mg) by mouth daily 60 tablet 2     spacer (OPTICHAMBER PATRICIA) holding chamber Holding/spacer device to use with inhaler. 1 each 0     SUMAtriptan (IMITREX) 25 MG tablet Take 1-2 tablets (25-50 mg) by mouth at onset of headache for migraine May repeat in 2 hours. Max 8 tablets/24 hours. 12 tablet 1     topiramate (TOPAMAX) 25 MG tablet Take 3 tablets (75 mg) by mouth 2 times daily 180 tablet 3     Allergies   Allergen Reactions     Imidazole Antifungals Hives     Tolerates diflucan     Ketoprofen Itching     Pruritis to topical     Lisinopril Hives     Metformin Other (See Comments)     Patient hospitalized for lactic acidosis - admitting provider suspectd caused by metformin     Metronidazole Hives     Posaconazole Hives     Tolerates diflucan     Recent Labs   Lab Test 03/30/19  1659 02/27/19  1218 01/30/19  1215 12/20/18  1532  10/17/18  1531 08/06/18  1038  05/28/18  1625 05/22/18  1434  11/07/17  0801  06/27/17  1358  07/13/16  1350  07/14/15  1215   A1C  --   --   --  6.4*  --   --  6.4*  --   --  6.2*   < > 8.9*   < > 7.0*   < >  --    < > 9.1*   LDL  --   --   --   --   --   --  84  --   --   --   --   --   --  64  --  137*  --  78   HDL  --   --   --   --   --   --  46*  --   --   --   --   --   --  37*  --   --   --  39*   TRIG  --   --   --   --   --   --  215*  --   --   --   --   --   --  267*  --   --   --  151*   ALT  --   --  20  --   --  23  --   --  27  --    < > 23   < > 32   < >  --    < >  --    CR 1.28* 0.93 1.31* 0.99   < > 1.04  --    < > 1.16*  --    < > 0.88   < > 0.78   < >  --    < > 0.67   GFRESTIMATED 46*  68 45* 63   < > 54*  --    < > 48*  --    < > 66   < > 76   < >  --    < > >90  Non  GFR Calc     GFRESTBLACK 53* 79 52* 73   < > 66  --    < > 58*  --    < > 80   < > >90   GFR Calc     < >  --    < > >90   GFR Calc     POTASSIUM 4.8 4.4 4.7 4.2   < > 4.3  --    < > 4.5  --    < > 3.9   < > 4.3   < >  --    < > 4.3   TSH  --   --   --  1.98  --   --   --   --   --   --   --  3.21  --   --    < >  --    < >  --     < > = values in this interval not displayed.      BP Readings from Last 3 Encounters:   03/30/19 133/83   03/18/19 122/60   03/14/19 118/66    Wt Readings from Last 3 Encounters:   03/18/19 107 kg (236 lb)   03/14/19 105.7 kg (233 lb)   02/27/19 110.2 kg (243 lb)        Labs reviewed in EPIC  Problem list, Medication list, Allergies, and Medical/Social/Surgical histories reviewed in [facility] and updated as appropriate.     ROS: Constitutional, neuro, ENT, endocrine, pulmonary, cardiac, gastrointestinal, genitourinary, musculoskeletal, integument and psychiatric systems are negative, except as otherwise noted above in the HPI.   OBJECTIVE:                                                    /74   Pulse 79   Temp 97.5  F (36.4  C) (Oral)   Resp 14   Ht 1.524 m (5')   Wt 108.6 kg (239 lb 8 oz)   LMP 01/06/2015   SpO2 95%   BMI 46.77 kg/m    Body mass index is 46.77 kg/m .  GENERAL: healthy, alert, well nourished, well hydrated, no distress  EYES: Eyes grossly normal to inspection, extraocular movements - intact, and PERRL  RESP: lungs clear to auscultation - no rales, no rhonchi, no wheezes  CV: regular rates and rhythm, normal S1 S2, no S3 or S4 and no murmur, no click or rub   ABDOMEN: soft, no tenderness,  normal bowel sounds  MS: extremities- no gross deformities noted, no edema  SKIN: no suspicious lesions, no rashes  NEURO: strength and tone- normal, sensory exam- grossly normal, mentation- intact, speech- normal,  Non focal no aphasia. No facial  asymmetry.   Mental Status Assessment:  Appearance:   Appropriate   Eye Contact:   Good   Psychomotor Behavior: Normal   Attitude:   Cooperative   Orientation:   All  Speech   Rate / Production: Slow    Volume:  Soft   Mood:    Normal  Affect:    Blunted   Thought Content:  Clear   Thought Form:  Coherent  Logical   Insight:    Good   Attention Span and Concentration: appropriate  Recent and Remote Memory:  intact  Fund of Knowledge: appropriate  Muscle Strength and Tone: normal   Suicidal Ideation: reports no thoughts, no intention    See [facility] notes    Diagnostic Test Results:  Results for orders placed or performed during the hospital encounter of 03/30/19   Chest XR,  PA & LAT    Narrative    CHEST TWO VIEWS  3/30/2019 5:25 PM     HISTORY: Chest pain.    COMPARISON: 10/17/2018      Impression    IMPRESSION: Cardiomegaly is unchanged. Otherwise normal.    FELIPE WILLETT MD   CBC (platelets, no diff)   Result Value Ref Range    WBC 9.0 4.0 - 11.0 10e9/L    RBC Count 3.31 (L) 3.8 - 5.2 10e12/L    Hemoglobin 10.3 (L) 11.7 - 15.7 g/dL    Hematocrit 33.6 (L) 35.0 - 47.0 %     (H) 78 - 100 fl    MCH 31.1 26.5 - 33.0 pg    MCHC 30.7 (L) 31.5 - 36.5 g/dL    RDW 14.2 10.0 - 15.0 %    Platelet Count 195 150 - 450 10e9/L   Basic metabolic panel (BMP)   Result Value Ref Range    Sodium 139 133 - 144 mmol/L    Potassium 4.8 3.4 - 5.3 mmol/L    Chloride 109 94 - 109 mmol/L    Carbon Dioxide 26 20 - 32 mmol/L    Anion Gap 4 3 - 14 mmol/L    Glucose 135 (H) 70 - 99 mg/dL    Urea Nitrogen 25 7 - 30 mg/dL    Creatinine 1.28 (H) 0.52 - 1.04 mg/dL    GFR Estimate 46 (L) >60 mL/min/[1.73_m2]    GFR Estimate If Black 53 (L) >60 mL/min/[1.73_m2]    Calcium 9.0 8.5 - 10.1 mg/dL   Troponin I   Result Value Ref Range    Troponin I ES <0.015 0.000 - 0.045 ug/L   EKG 12 lead   Result Value Ref Range    Interpretation ECG Click View Image link to view waveform and result      *Note: Due to a large number of results and/or encounters for  the requested time period, some results have not been displayed. A complete set of results can be found in Results Review.        ASSESSMENT/PLAN:                                                    1. Type 2 diabetes mellitus with diabetic neuropathy, with long-term current use of insulin (H)  Needs improvement especially with patient on scheduled prednisone for shoulder pain. Patient is very diligent about checking her blood sugars. Still waiting for the new freestyle Gabby for her glucose monitoring.      Lab Results   Component Value Date    A1C 6.4 12/20/2018    A1C 6.4 08/06/2018    A1C 6.2 05/22/2018    A1C 6.8 02/12/2018    A1C 8.9 12/09/2017       - gabapentin (NEURONTIN) 300 MG capsule; TAKE 2 CAPSULES (600MG) BY MOUTH IN THE MORNING AND AFTERNOON; 3 CAPSULES AT BEDTIME.  Dispense: 210 capsule; Refill: 1    2. HTN, goal below 140/90  Denies any symptoms. No changes to medications at this time.      3. Schizoaffective disorder, depressive type (H)  Stable.   Continue with therapy and following up with psychiatry as recommended.       Patient Instructions   -Increase Gabapentin to 900 mg at bedtime.   -Call clinic with any questions or concerns.     I spent 25 min spent in direct face to face time with Nena Tang, greater than 50% in counseling and coordination of care for:  Diabetes, Hypertension, ED follow-up and Schizoaffective.     ART Fay [facility] PRIMARY CARE

## 2019-04-05 NOTE — PROGRESS NOTES
[facility] Psychiatry Progress Note                                                              Nena Tang is a 58 year old female who was referred by her primary care provider for treatment and evaluation of depression and psychosis  Therapist: N/A  PCP: Rowena Haas  Other Providers: N/A     History was provided by patient and care taker Zheng Barnes (tel # [phone]) who was a limited historian.    Pertinent Background:  See section specific documentation.  Psych critical item history includes suicidal ideation, psychosis [sxs include hallucinations], mutiple psychotropic trials, psych hosp (>5) and SUBSTANCE USE: cocaine and alcohol  Interim History                                                                                                        4, 4     Had one ED visit on 3/30/19 for dizziness where she was medically cleared and sent home.     Saw primary care provider on 19. Records reviewed from both visits.     She is on prednisone for arthritis. She reports feeling irritable and having low grade depression. Discussed that prednisone can cause mood irregularities.     She stated that she was tolerating her psychiatric medications without side effects.    She is planning on spreading her  's ashes in a lake once the weather warms up.     Recent Symptoms:   Depression:  She said that she felt depressed but reported no suicidal ideation, without intent, depressed mood, anhedonia, low energy and excessive crying  Psychosis:  delusions, auditory hallucinations and visual hallucinations    Recent Substance Use:  none reported        Social/ Family History                                  [per patient report]                                 1ea,1ea   She is currently on SSDI and lives in an Adult [facility]. She is  for the last 3-4 years and was  about 12 years. She has two adult sons. She reported no history of abuse in her  life    Medical / Surgical History                                                                                                                  Patient Active Problem List   Diagnosis     Osteopenia     Vitamin B12 deficiency without anemia     Hyperlipidemia LDL goal <100     Rotator cuff syndrome     Type 2 diabetes mellitus with mild nonproliferative retinopathy (H)     Illiterate     Irritable bowel syndrome     overweight - BMI >35     Takotsubo cardiomyopathy     CAD (coronary artery disease)     Restless legs syndrome (RLS)     CINDY (obstructive sleep apnea)- mild AHI 10.3     Verbal auditory hallucination     Chronic low back pain     Schizoaffective disorder, depressive type (H)     Migraine headache     HTN, goal below 140/90     Health Care Home     Lumbago     Cervicalgia     Cocaine abuse, episodic (H)     Suicidal ideation     Esophageal reflux     Mild nonproliferative diabetic retinopathy (H)     Tardive dyskinesia     Alcohol use     Left cataract     Falls frequently     History of uterine cancer     Psychophysiological insomnia     Dysuria     Asymptomatic postmenopausal status     Abdominal pain, right lower quadrant     Infectious encephalopathy     Non-intractable vomiting with nausea     Thoughts of self harm     Gastroenteritis     Posttraumatic stress disorder     Cervical cancer screening     Type 2 diabetes mellitus with stage 3 chronic kidney disease, with long-term current use of insulin (H)     Positive AIDA (antinuclear antibody)       Past Surgical History:   Procedure Laterality Date     C OOPHORECTORMY FOR MALALEJO, W/BX  1983    UTERINE     CATARACT IOL, RT/LT Bilateral 2017     COLONOSCOPY N/A 3/16/2017    Procedure: COLONOSCOPY;  Surgeon: Traci Gonzalez MD;  Location: [facility]     Coronary CTA  5/21/2014     HYSTERECTOMY  1983    uterine cancer yearly pap's per provider.     LAPAROSCOPIC CHOLECYSTECTOMY  2008     PHACOEMULSIFICATION CLEAR CORNEA WITH STANDARD  INTRAOCULAR LENS IMPLANT Left 5/5/2017    Procedure: PHACOEMULSIFICATION CLEAR CORNEA WITH STANDARD INTRAOCULAR LENS IMPLANT;  LEFT EYE PHACOEMULSIFICATION CLEAR CORNEA WITH STANDARD INTRAOCULAR LENS IMPLANT ;  Surgeon: Tyra Diaz MD;  Location:  [facility]     PHACOEMULSIFICATION CLEAR CORNEA WITH STANDARD INTRAOCULAR LENS IMPLANT Right 6/30/2017    Procedure: PHACOEMULSIFICATION CLEAR CORNEA WITH STANDARD INTRAOCULAR LENS IMPLANT;  RIGHT EYE PHACOEMULSIFICATION CLEAR CORNEA WITH STANDARD INTRAOCULAR LENS IMPLANT;  Surgeon: Tyra Diaz MD;  Location:  [facility]     RELEASE TRIGGER FINGER  10/11/2012    Left thumb. Procedure: RELEASE TRIGGER FINGER;  LEFT THUMB TRIGGER RELEASE;  Surgeon: Tay Langley MD;  Location:  [facility]     RELEASE TRIGGER FINGER Right 12/26/2016    Procedure: RELEASE TRIGGER FINGER;  Surgeon: Albino Castañeda MD;  Location:  OR        Medical Review of Systems                                                                                                    2,10   A comprehensive review of systems was performed and is negative other than noted in the HPI or interim history.    Allergy                                Imidazole antifungals; Ketoprofen; Lisinopril; Metformin; Metronidazole; and Posaconazole  Current Medications                                                                                                       Current Outpatient Medications   Medication Sig Dispense Refill     acetaminophen (TYLENOL) 500 MG tablet Take 2 tablets (1,000 mg) by mouth 3 times daily as needed for mild pain 100 tablet 3     albuterol (PROAIR HFA/PROVENTIL HFA/VENTOLIN HFA) 108 (90 Base) MCG/ACT Inhaler Inhale 2 puffs into the lungs every 6 hours as needed for shortness of breath / dyspnea or wheezing 1 Inhaler 0     aspirin (ASA) 81 MG EC tablet TAKE 1 TABLET (81MG) BY MOUTH DAILY 30 tablet 3     atorvastatin (LIPITOR) 80 MG tablet TAKE 1 TABLET (80MG) BY MOUTH DAILY 30 tablet 11      benztropine (COGENTIN) 0.5 MG tablet Take 2 tablets (1 mg) by mouth 2 times daily 120 tablet 2     blood glucose (NO BRAND SPECIFIED) test strip Use to test blood sugar  4 times daily or as directed. To accompany: Blood Glucose Monitor Brands: per insurance. 100 strip 11     calcium carbonate (OS-ALLEN 600 MG [place]. [place]) 1500 (600 [place]) MG tablet Take 1 tablet (1,500 mg) by mouth daily 180 tablet 3     cholecalciferol (VITAMIN D3) 03619 units (1250 mcg) capsule TAKE 1 CAPSULE (50,000 UNITS) MONTHLY 4 capsule 3     Continuous Blood Gluc  (FREESTYLE LEBRON 14 DAY READER) RO 1 Device 3 times daily For continuous glucose monitoring. 1 Device 0     Continuous Blood Gluc Sensor (FREESTYLE LEBRON 14 DAY SENSOR) MISC 1 Device every 14 days 6 each 3     diclofenac (VOLTAREN) 1 % topical gel Place 4 g onto the skin 4 times daily as needed for moderate pain 1 Tube 1     dulaglutide (TRULICITY) 1.5 MG/0.5ML pen Inject 1.5 mg Subcutaneous every 7 days 2 mL 3     fluticasone (FLONASE) 50 MCG/ACT nasal spray Spray 1 spray into both nostrils daily       gabapentin (NEURONTIN) 300 MG capsule TAKE 2 CAPSULES (600MG) BY MOUTH THREE TIMES A  capsule 1     glucose 4 G CHEW chewable tablet Take 2 every 15 minutes for blood sugar <70mg/dL. Recheck blood sugar every 15 minutes until above 70mg/dL, then eat a substantial meal. 20 tablet 1     guaiFENesin (MUCINEX) 600 MG 12 hr tablet Take 2 tablets (1,200 mg) by mouth 2 times daily as needed for congestion or cough 30 tablet 0     guaiFENesin (ROBITUSSIN) 20 mg/mL SOLN solution Take 10 mLs by mouth every 6 hours as needed for cough 236 mL 0     insulin aspart (NOVOLOG FLEXPEN) 100 UNIT/ML pen Inject 20 Units Subcutaneous 3 times daily (with meals) Once daily, can add additional 5 units if BGs are >500mg/dL. 15 mL 11     insulin glargine (LANTUS SOLOSTAR) 100 UNIT/ML pen Inject 48 Units Subcutaneous At Bedtime 3 mL 11     insulin pen needle (NOVOFINE 30) 30G X 8 MM USE 4 DAILY OR AS  DIRECTED 400 each 0     ipratropium - albuterol 0.5 mg/2.5 mg/3 mL (DUONEB) 0.5-2.5 (3) MG/3ML neb solution Take 1 vial (3 mLs) by nebulization every 6 hours as needed for shortness of breath / dyspnea or wheezing 30 vial 0     leflunomide (ARAVA) 20 MG tablet Take 1 tablet (20 mg) by mouth daily Labs every 8-12 weeks 60 tablet 0     losartan (COZAAR) 50 MG tablet Take 1 tablet (50 mg) by mouth daily 90 tablet 3     melatonin 5 MG CAPS Take 2 capsules by mouth At Bedtime 180 capsule 3     metoprolol succinate (TOPROL-XL) 25 MG 24 hr tablet Take 1 tablet (25 mg) by mouth daily 90 tablet 1     order for DME Equipment being ordered: Freestyle Gabby [place] 1 Device 0     paliperidone ER (INVEGA) 9 MG 24 hr tablet Take 1 tablet (9 mg) by mouth every morning 30 tablet 2     polyethylene glycol (MIRALAX/GLYCOLAX) powder TAKE 8.5 GRAMS (1/2 CAPFUL) BY MOUTH DAILY 527 g 1     predniSONE (DELTASONE) 5 MG tablet 15mg PO daily X 2 weeks; then 10mg Po daily. 90 tablet 1     ranitidine (ZANTAC) 300 MG tablet TAKE 1 TABLET (300MG) BY MOUTH AT BEDTIME 90 tablet 1     senna-docusate (SENOKOT-S;PERICOLACE) 8.6-50 MG per tablet Take 1 tablet by mouth 2 times daily as needed for constipation 100 tablet 1     sertraline (ZOLOFT) 100 MG tablet Take 2 tablets (200 mg) by mouth daily 60 tablet 2     spacer (OPTICHAMBER PATRICIA) holding chamber Holding/spacer device to use with inhaler. 1 each 0     SUMAtriptan (IMITREX) 25 MG tablet Take 1-2 tablets (25-50 mg) by mouth at onset of headache for migraine May repeat in 2 hours. Max 8 tablets/24 hours. 12 tablet 1     topiramate (TOPAMAX) 25 MG tablet Take 3 tablets (75 mg) by mouth 2 times daily 180 tablet 3     Vitals                                                                                                                       3, 3   LMP 01/06/2015    Mental Status Exam                                                                                    9, 14 cog gs     Appearance:  casually groomed  Behavior/Demeanor: cooperative, with good  eye contact   Speech: normal  Language: intact  Psychomotor: abnormal movements of the tongue and lower jaw  Mood: depressed  Affect: full range; was congruent to mood; was congruent to content  Thought Process/Associations: unremarkable  Thought Content:  Reports none;  Denies suicidal ideation and violent ideation  Perception:  Reports none;  Denies auditory hallucinations and visual hallucinations  Insight: limited  Judgment: limited  Cognition: (6) does  appear grossly intact; formal cognitive testing was not done  grossly oriented to her circumstances  Gait and Station: unremarkable    Labs and Data                                                                                                                   PHQ9   PHQ-9 SCORE 2/3/2017 3/13/2018 10/26/2018   PHQ-9 Total Score - - -   PHQ-9 Total Score - - -   PHQ-9 Total Score 0 14 20         Diagnosis and Assessment                                                                             m2, h3     Today the following issues were addressed:    1) Psychosis  2) Grief    [place] Prescription Monitoring Program [] review was not needed today.    PSYCHOTROPIC DRUG INTERACTIONS: none clinically relevant     Diagnosis:  Schizoaffective Disorder  Plan                                                                                                                    m2, h3      Continue current medications:  Current Outpatient Medications   Medication Sig     benztropine (COGENTIN) 0.5 MG tablet Take 2 tablets (1 mg) by mouth 2 times daily     paliperidone ER (INVEGA) 9 MG 24 hr tablet Take 1 tablet (9 mg) by mouth every morning     sertraline (ZOLOFT) 100 MG tablet Take 2 tablets (200 mg) by mouth daily     acetaminophen (TYLENOL) 500 MG tablet Take 2 tablets (1,000 mg) by mouth 3 times daily as needed for mild pain     albuterol (PROAIR HFA/PROVENTIL HFA/VENTOLIN HFA) 108 (90 Base) MCG/ACT Inhaler  Inhale 2 puffs into the lungs every 6 hours as needed for shortness of breath / dyspnea or wheezing     aspirin (ASA) 81 MG EC tablet TAKE 1 TABLET (81MG) BY MOUTH DAILY     atorvastatin (LIPITOR) 80 MG tablet TAKE 1 TABLET (80MG) BY MOUTH DAILY     blood glucose (NO BRAND SPECIFIED) test strip Use to test blood sugar  4 times daily or as directed. To accompany: Blood Glucose Monitor Brands: per insurance.     calcium carbonate (OS-ALLEN 600 MG [place]. [place]) 1500 (600 [place]) MG tablet Take 1 tablet (1,500 mg) by mouth daily     cholecalciferol (VITAMIN D3) 28897 units (1250 mcg) capsule TAKE 1 CAPSULE (50,000 UNITS) MONTHLY     Continuous Blood Gluc  (FREESTYLE LEBRON 14 DAY READER) RO 1 Device 3 times daily For continuous glucose monitoring.     Continuous Blood Gluc Sensor (FREESTYLE LEBRON 14 DAY SENSOR) MISC 1 Device every 14 days     diclofenac (VOLTAREN) 1 % topical gel Place 4 g onto the skin 4 times daily as needed for moderate pain     dulaglutide (TRULICITY) 1.5 MG/0.5ML pen Inject 1.5 mg Subcutaneous every 7 days     gabapentin (NEURONTIN) 300 MG capsule TAKE 2 CAPSULES (600MG) BY MOUTH IN THE MORNING AND AFTERNOON; 3 CAPSULES AT BEDTIME.     glucose 4 G CHEW chewable tablet Take 2 every 15 minutes for blood sugar <70mg/dL. Recheck blood sugar every 15 minutes until above 70mg/dL, then eat a substantial meal.     insulin aspart (NOVOLOG FLEXPEN) 100 UNIT/ML pen Inject 20 Units Subcutaneous 3 times daily (with meals) Once daily, can add additional 5 units if BGs are >500mg/dL.     insulin glargine (LANTUS SOLOSTAR) 100 UNIT/ML pen Inject 48 Units Subcutaneous At Bedtime     insulin pen needle (NOVOFINE 30) 30G X 8 MM USE 4 DAILY OR AS DIRECTED     ipratropium - albuterol 0.5 mg/2.5 mg/3 mL (DUONEB) 0.5-2.5 (3) MG/3ML neb solution Take 1 vial (3 mLs) by nebulization every 6 hours as needed for shortness of breath / dyspnea or wheezing     leflunomide (ARAVA) 20 MG tablet Take 1 tablet (20 mg) by mouth daily  Labs every 8-12 weeks     losartan (COZAAR) 50 MG tablet Take 1 tablet (50 mg) by mouth daily     melatonin 5 MG CAPS Take 2 capsules by mouth At Bedtime     metoprolol succinate (TOPROL-XL) 25 MG 24 hr tablet Take 1 tablet (25 mg) by mouth daily     order for DME Equipment being ordered: Freestyle Gabby [place]     polyethylene glycol (MIRALAX/GLYCOLAX) powder TAKE 8.5 GRAMS (1/2 CAPFUL) BY MOUTH DAILY     predniSONE (DELTASONE) 5 MG tablet 15mg PO daily X 2 weeks; then 10mg Po daily.     ranitidine (ZANTAC) 300 MG tablet TAKE 1 TABLET (300MG) BY MOUTH AT BEDTIME     senna-docusate (SENOKOT-S;PERICOLACE) 8.6-50 MG per tablet Take 1 tablet by mouth 2 times daily as needed for constipation     spacer (OPTICHAMBER PATRICIA) holding chamber Holding/spacer device to use with inhaler.     SUMAtriptan (IMITREX) 25 MG tablet Take 1-2 tablets (25-50 mg) by mouth at onset of headache for migraine May repeat in 2 hours. Max 8 tablets/24 hours.     topiramate (TOPAMAX) 25 MG tablet Take 3 tablets (75 mg) by mouth 2 times daily     No current facility-administered medications for this visit.      RTC: 4 weeks with me and then with primary care provider.     CRISIS NUMBERS:   Provided routinely in AVS.    Treatment Risk Statement:  The patient understands the risks, benefits, adverse effects and alternatives. Agrees to treatment with the capacity to do so. No medical contraindications to treatment. Agrees to call clinic for any problems. The patient understands to call 911 or go to the nearest ED if life threatening or urgent symptoms occur.      PROVIDER:  Kraig Pedersen MD

## 2019-04-08 ENCOUNTER — OFFICE VISIT (OUTPATIENT)
Dept: PSYCHIATRY | Facility: CLINIC | Age: 59
End: 2019-04-08
Payer: MEDICARE

## 2019-04-08 DIAGNOSIS — E66.01 MORBID OBESITY (H): Primary | ICD-10-CM

## 2019-04-08 DIAGNOSIS — Z79.4 TYPE 2 DIABETES MELLITUS WITH MILD NONPROLIFERATIVE RETINOPATHY WITHOUT MACULAR EDEMA, WITH LONG-TERM CURRENT USE OF INSULIN, UNSPECIFIED LATERALITY (H): ICD-10-CM

## 2019-04-08 DIAGNOSIS — K59.00 CONSTIPATION, UNSPECIFIED CONSTIPATION TYPE: ICD-10-CM

## 2019-04-08 DIAGNOSIS — E11.3299 TYPE 2 DIABETES MELLITUS WITH MILD NONPROLIFERATIVE RETINOPATHY WITHOUT MACULAR EDEMA, WITH LONG-TERM CURRENT USE OF INSULIN, UNSPECIFIED LATERALITY (H): ICD-10-CM

## 2019-04-08 DIAGNOSIS — F25.0 SCHIZOAFFECTIVE DISORDER, BIPOLAR TYPE (H): ICD-10-CM

## 2019-04-08 DIAGNOSIS — G25.9 EXTRAPYRAMIDAL AND MOVEMENT DISORDER: ICD-10-CM

## 2019-04-08 DIAGNOSIS — K21.9 GASTROESOPHAGEAL REFLUX DISEASE WITHOUT ESOPHAGITIS: ICD-10-CM

## 2019-04-08 PROCEDURE — 99207 ZZC CDG-MDM COMPONENT: MEETS LOW - DOWN CODED: CPT | Performed by: PSYCHIATRY & NEUROLOGY

## 2019-04-08 PROCEDURE — 99213 OFFICE O/P EST LOW 20 MIN: CPT | Performed by: PSYCHIATRY & NEUROLOGY

## 2019-04-08 RX ORDER — SERTRALINE HYDROCHLORIDE 100 MG/1
200 TABLET, FILM COATED ORAL DAILY
Qty: 60 TABLET | Refills: 2 | Status: SHIPPED | OUTPATIENT
Start: 2019-04-08 | End: 2019-07-02

## 2019-04-08 RX ORDER — POLYETHYLENE GLYCOL 3350 17 G/17G
POWDER, FOR SOLUTION ORAL
Qty: 527 G | Refills: 1 | Status: SHIPPED | OUTPATIENT
Start: 2019-04-08 | End: 2019-05-07

## 2019-04-08 RX ORDER — PALIPERIDONE 9 MG/1
9 TABLET, EXTENDED RELEASE ORAL EVERY MORNING
Qty: 30 TABLET | Refills: 2 | Status: SHIPPED | OUTPATIENT
Start: 2019-04-08 | End: 2019-07-05

## 2019-04-08 RX ORDER — BENZTROPINE MESYLATE 0.5 MG/1
1 TABLET ORAL 2 TIMES DAILY
Qty: 120 TABLET | Refills: 2 | Status: SHIPPED | OUTPATIENT
Start: 2019-04-08 | End: 2019-07-02

## 2019-04-08 NOTE — TELEPHONE ENCOUNTER
"Requested Prescriptions   Pending Prescriptions Disp Refills     polyethylene glycol (MIRALAX/GLYCOLAX) powder  Last Written Prescription Date:  12/17/18  Last Fill Quantity: 527g,  # refills: 1   Last office visit: No previous visit found with prescribing provider:     Future Office Visit:   Next 5 appointments (look out 90 days)    Apr 08, 2019 10:30 AM CDT  Return Visit with Kraig Pedersen MD  [facility] ([facility]) [address]  [facility] [address] [phone]   Apr 09, 2019 12:00 PM CDT  Return Visit with Eladio Kwong MD  [facility] ([facility]) [address] [place] [id number] [phone]   May 07, 2019  2:00 PM CDT  Return Visit with ART Arias CNP  [facility] ([facility]) [address] [place] [address]  [address] [place] [id number] [phone]   May 07, 2019  2:00 PM CDT  Return Visit with Riaz Monteneg[facility] ([facility]) [address] [place] [address]  [address] [place] [id number] [phone]   May 07, 2019  2:00 PM CDT  Office Visit with Eugenia De Jesus [facility] [place] ([facility]) [address] [place] [address] [phone]          527 g 1     Sig: TAKE 8.5 GRAMS (1/2 CAPFUL) BY MOUTH DAILY       Laxatives Protocol Passed - 4/8/2019  9:42 AM        Passed - Patient is age 6 or older        Passed - Recent (12 mo) or future (30 days) visit within the authorizing provider's specialty     Patient had office visit in the last 12 months or has a visit in the next 30 days with authorizing provider or within the authorizing provider's specialty.  See \"Patient Info\" tab in inbasket, or \"Choose Columns\" in Meds & Orders section " of the refill encounter.              Passed - Medication is active on med list

## 2019-04-08 NOTE — TELEPHONE ENCOUNTER
Rx approved and refilled per [facility] to the [facility] refill protocol.     Corine Cunha RN  04/08/19  10:03 AM

## 2019-04-08 NOTE — TELEPHONE ENCOUNTER
Rx approved and refilled per [facility]ill protocol.     Corine Cunha RN  04/08/19  2:33 PM

## 2019-04-08 NOTE — TELEPHONE ENCOUNTER
"Requested Prescriptions   Pending Prescriptions Disp Refills     insulin pen needle (NOVOFINE 30) 30G X 8 MM miscellaneous  Last Written Prescription Date:  11/9/18  Last Fill Quantity: 400,  # refills: 0   Last office visit: No previous visit found with prescribing provider:     Future Office Visit:   Next 5 appointments (look out 90 days)    Apr 09, 2019 12:00 PM CDT  Return Visit with Eladio Kwong MD  [facility] ([facility]) [address] [address]  [phone]   May 06, 2019 11:00 AM CDT  Return Visit with Kraig Pedersen MD  [facility] ([facility]) [address] [place] [address] [phone]   May 07, 2019  2:00 PM CDT  Return Visit with ART Arias CNP  [facility] ([facility]) [address] [place] [address]  [address] [place] [address]  [phone]   May 07, 2019  2:00 PM CDT  [facility]) [address] [address] [address]   [address] [place] [address]  [phone]   May 07, 2019  2:00 PM CDT  Office Visit with Eugenia De Jesus [facility] ([facility]) [address] [place] [address] [address] [place] [address]  [phone]          400 each 0     Sig: USE 4 DAILY OR AS DIRECTED       Diabetic Supplies Protocol Passed - 4/8/2019 12:34 PM        Passed - Medication is active on med list        Passed - Patient is 18 years of age or older        Passed - Recent (6 mo) or future (30 days) visit within the authorizing provider's specialty     Patient had office visit in the last 6 months or has a visit in the next 30 days with authorizing provider.  See \"Patient Info\" tab in inbasket, or \"Choose Columns\" in Meds & " "Orders section of the refill encounter.            ranitidine (ZANTAC) 300 MG tablet  Last Written Prescription Date:  10/223/18  Last Fill Quantity: 90,  # refills: 1   Last office visit: No previous visit found with prescribing provider:     Future Office Visit:   Next 5 appointments (look out 90 days)    Apr 09, 2019 12:00 PM CDT  Return Visit with Eladio Kwong MD  [facility] ([facility]) [address] [place] [phone]  [phone]   May 06, 2019 11:00 AM CDT  Return Visit with Kraig Pedersen MD  [facility] ([facility]) [address] [place] [address]  [phone]   May 07, 2019  2:00 PM CDT  Return Visit with ART Arias CNP  [facility] ([facility]) [address] [place] [address]  [address] [place] [address]  [phone]   May 07, 2019  2:00 PM CDT  Return Visit with Riaz Montenegro [facility] ([facility]) [address] [facility]   [address] [place] [address]  [phone]   May 07, 2019  2:00 PM CDT  Office Visit with Eugenia De Jesus [facility] ([facility]) [address] [place] [address]  [phone]          90 tablet 1     Sig: TAKE 1 TABLET (300MG) BY MOUTH AT BEDTIME       H2 Blockers Protocol Passed - 4/8/2019 12:34 PM        Passed - Patient is age 12 or older        Passed - Recent (12 mo) or future (30 days) visit within the authorizing provider's specialty     Patient had office visit in the last 12 months or has a visit in the next 30 days with authorizing provider or within the authorizing provider's specialty.  See \"Patient Info\" tab in inbasket, or \"Choose Columns\" in Meds & Orders section of the refill " encounter.              Passed - Medication is active on med list

## 2019-04-09 ENCOUNTER — OFFICE VISIT (OUTPATIENT)
Dept: RHEUMATOLOGY | Facility: CLINIC | Age: 59
End: 2019-04-09
Payer: MEDICARE

## 2019-04-09 VITALS
HEIGHT: 60 IN | TEMPERATURE: 97.1 F | BODY MASS INDEX: 46.92 KG/M2 | HEART RATE: 86 BPM | OXYGEN SATURATION: 94 % | SYSTOLIC BLOOD PRESSURE: 124 MMHG | DIASTOLIC BLOOD PRESSURE: 68 MMHG | WEIGHT: 239 LBS

## 2019-04-09 DIAGNOSIS — M81.8 OTHER OSTEOPOROSIS WITHOUT CURRENT PATHOLOGICAL FRACTURE: ICD-10-CM

## 2019-04-09 DIAGNOSIS — Z79.899 ENCOUNTER FOR MONITORING LEFLUNOMIDE THERAPY: Primary | ICD-10-CM

## 2019-04-09 DIAGNOSIS — M25.512 CHRONIC PAIN OF BOTH SHOULDERS: ICD-10-CM

## 2019-04-09 DIAGNOSIS — Z51.81 ENCOUNTER FOR MONITORING LEFLUNOMIDE THERAPY: Primary | ICD-10-CM

## 2019-04-09 DIAGNOSIS — Z11.59 ENCOUNTER FOR SCREENING FOR OTHER VIRAL DISEASES: ICD-10-CM

## 2019-04-09 DIAGNOSIS — M25.511 CHRONIC PAIN OF BOTH SHOULDERS: ICD-10-CM

## 2019-04-09 DIAGNOSIS — G89.29 CHRONIC PAIN OF BOTH SHOULDERS: ICD-10-CM

## 2019-04-09 PROCEDURE — 99213 OFFICE O/P EST LOW 20 MIN: CPT | Performed by: INTERNAL MEDICINE

## 2019-04-09 RX ORDER — PREDNISONE 5 MG/1
TABLET ORAL
Qty: 90 TABLET | Refills: 1 | Status: SHIPPED | OUTPATIENT
Start: 2019-04-09 | End: 2019-04-09

## 2019-04-09 RX ORDER — PREDNISONE 5 MG/1
TABLET ORAL
Qty: 150 TABLET | Refills: 0 | Status: SHIPPED | OUTPATIENT
Start: 2019-04-09 | End: 2019-05-07

## 2019-04-09 ASSESSMENT — MIFFLIN-ST. JEOR: SCORE: 1585.6

## 2019-04-09 ASSESSMENT — ROUTINE ASSESSMENT OF PATIENT INDEX DATA (RAPID3)
TOTAL RAPID3 SCORE: 17
RAPID3 INTERPRETATION: HIGH > 12.0

## 2019-04-09 NOTE — NURSING NOTE
Chief Complaint   Patient presents with     RECHECK       Initial /68   Pulse 86   Temp 97.1  F (36.2  C) (Temporal)   Ht 1.524 m (5')   Wt 108.4 kg (239 lb)   LMP 01/06/2015   SpO2 94%   BMI 46.68 kg/m   Estimated body mass index is 46.68 kg/m  as calculated from the following:    Height as of this encounter: 1.524 m (5').    Weight as of this encounter: 108.4 kg (239 lb).  Medication Reconciliation: complete    Have you ever seen a rheumatologist yes Who you When 3/14/19  Joint pain history  Onset: Patient is here for a follow up. Patient feels pain in shoulders is worse than before. Pain never goes away.  Involved joints: shoulders  Pain scale:  8/10     Wakes the patient from sleep : Yes  Morning stiffness:Yes for 10 minutes  Meds used:prednisone, leflunomide, tylenol    Interim history  Since last visit:  1. Infections - No  2. New symptoms/medical problem - Yes, creatine at 1.3, kidney issues they are looking at  3. Any side effects from Rheum medications -high sugar levels from prednisone  3. ER visits/Hospitalizations/surgeries - Yes, 3/30/19-kidney insufficiency   4. Last PCP visit: 4/5/19  Wt Readings from Last 4 Encounters:   04/09/19 108.4 kg (239 lb)   04/05/19 108.6 kg (239 lb 8 oz)   03/18/19 107 kg (236 lb)   03/14/19 105.7 kg (233 lb)     BP Readings from Last 3 Encounters:   04/09/19 124/68   04/05/19 124/74   03/30/19 133/83

## 2019-04-09 NOTE — TELEPHONE ENCOUNTER
"Requested Prescriptions   Pending Prescriptions Disp Refills     calcium carbonate (OS-ALLEN) 1500 (600 Ca) MG tablet  Last Written Prescription Date:  3/13/18  Last Fill Quantity: 180,  # refills: 3   Last office visit: No previous visit found with prescribing provider:     Future Office Visit:   Next 5 appointments (look out 90 days)    May 06, 2019 11:00 AM CDT  Return Visit with Kraig Pedersen MD  [facility] ([facility]) [address] [place] [id number] [phone]   May 07, 2019  2:00 PM CDT  Return Visit with ART Arias CNP  [facility] ([facility]) [address] [place] [address]  [address] [place] [id number] [phone]   May 07, 2019  2:00 PM CDT  [facility] [address] [place] [address]  [address] [place] [id number] [phone]   May 07, 2019  2:00 PM CDT  Office Visit with Eugenia De Jesus [facility] ([facility]) [address] [place] [address] [address] [place] [id number] [phone]   May 21, 2019 12:00 PM CDT  Return Visit with Eladio Kwong MD  [facility] ([facility]) [address] [id number] [phone]          180 tablet 3     Sig: Take 3 tablets (1,800 mg) by mouth daily       Vitamin Supplements (Adult) Protocol Passed - 4/9/2019  1:31 PM        Passed - High dose Vitamin D not ordered        Passed - Recent (12 mo) or future (30 days) visit within the authorizing provider's specialty     Patient had office visit in the last 12 months or has a visit in the next 30 days with authorizing provider or within the authorizing provider's specialty.  See \"Patient Info\" tab in inbasket, or \"Choose " "Columns\" in Meds & Orders section of the refill encounter.              Passed - Medication is active on med list          "

## 2019-04-10 RX ORDER — TOPIRAMATE 50 MG/1
75 TABLET, FILM COATED ORAL 2 TIMES DAILY
Qty: 90 TABLET | Refills: 0 | Status: SHIPPED | OUTPATIENT
Start: 2019-04-10 | End: 2019-04-15

## 2019-04-10 NOTE — TELEPHONE ENCOUNTER
Medication Refill Request    Medication(s) requested:  calcium carbonate (OS-ALLEN) 1500 (600 Ca) MG tablet      Last Office Visit: 4/5/19  Next Office Visit: 5/7/19  Labs: up to date     Routing to PCP for review-- 1500 mg tablet not available, need to increase or decrease dosing.    Corine Cunha RN  04/10/19  8:11 AM

## 2019-04-12 ENCOUNTER — OFFICE VISIT (OUTPATIENT)
Dept: ORTHOPEDICS | Facility: CLINIC | Age: 59
End: 2019-04-12
Payer: MEDICARE

## 2019-04-12 ENCOUNTER — TELEPHONE (OUTPATIENT)
Dept: FAMILY MEDICINE | Facility: CLINIC | Age: 59
End: 2019-04-12

## 2019-04-12 VITALS
BODY MASS INDEX: 46.92 KG/M2 | WEIGHT: 239 LBS | SYSTOLIC BLOOD PRESSURE: 122 MMHG | HEIGHT: 60 IN | DIASTOLIC BLOOD PRESSURE: 70 MMHG

## 2019-04-12 DIAGNOSIS — M25.512 ACUTE PAIN OF BOTH SHOULDERS: ICD-10-CM

## 2019-04-12 DIAGNOSIS — M79.18 BILATERAL MYOFASCIAL PAIN: ICD-10-CM

## 2019-04-12 DIAGNOSIS — G89.4 CHRONIC PAIN SYNDROME: Primary | ICD-10-CM

## 2019-04-12 DIAGNOSIS — M25.511 ACUTE PAIN OF BOTH SHOULDERS: ICD-10-CM

## 2019-04-12 PROCEDURE — 99214 OFFICE O/P EST MOD 30 MIN: CPT | Performed by: FAMILY MEDICINE

## 2019-04-12 ASSESSMENT — MIFFLIN-ST. JEOR: SCORE: 1585.6

## 2019-04-12 NOTE — LETTER
4/12/2019         RE: Nena Tang  [address] [facility] [address]        Dear Colleague,    Thank you for referring your patient, Nena Tang, to the [facility]. Please see a copy of my visit note below.    ASSESSMENT & PLAN    1. Chronic pain syndrome    2. Acute pain of both shoulders    3. Bilateral myofascial pain      Presents with acute bilateral shoulder pain with significant myofascial component  Exam is non-focal. Low suspicion for rotator cuff tearing  Recommend pain physical therapy to address myofascial pain which is most causative  Early frozen shoulder could also be causing some decreased range of motion  Need to be sure she can manage blood sugars before doing a cortisone injection.   Would start with therapy first and if not improving can proceed with injection and would recommend one shoulder at a time.  Patient appears sleepy and somewhat slowed in speech. Asked if this was normal / any medication changes. Indicated no changes in medications.     Follow-up if pain is not improved after 4 therapy visits for cortisone injection.    -----    SUBJECTIVE  Nena Tang is a/an 59 year old Left handed female who is seen in consultation at the request of  Eladio Kwong M.D. for evaluation of bilateral shoulder pain. The patient is seen by themselves.    Patient is currently under the care of Dr. Kwong for autoimmune disease and is being sent over to evaluated for possible other causes of shoulder pain other than autoimmune issues.     Onset: 2 month(s) ago. Reports insidious onset without acute precipitating event.  Location of Pain: bilateral shoulder pain - diffuse  Rating of Pain at worst: 8/10  Rating of Pain Currently: 7/10  Worsened by: any movement of the shoulders/arms  Better with: keeping arms rested at her sides  Treatments tried: Tylenol, other medications: Prednisone (Medrol)  Associated symptoms: weakness in left arm, loss  of ROM/function due to pain  Orthopedic history: currently seeing rheumatology for possible autoimmune disease  Relevant surgical history: NO  Patient Social History: unemployed    Patient's past medical, surgical, social, and family histories were reviewed today and no changes are noted.    REVIEW OF SYSTEMS:  10 point ROS is negative other than symptoms noted above in HPI, Past Medical History or as stated below  Constitutional: NEGATIVE for fever, chills, change in weight  Skin: NEGATIVE for worrisome rashes, moles or lesions  GI/: NEGATIVE for bowel or bladder changes  Neuro: NEGATIVE for weakness, dizziness or paresthesias    OBJECTIVE:  /70   Ht 1.524 m (5')   Wt 108.4 kg (239 lb)   LMP 01/06/2015   BMI 46.68 kg/m      General: healthy, alert and in no distress  HEENT: no scleral icterus or conjunctival erythema  Skin: no suspicious lesions or rash. No jaundice.  CV: regular rhythm by palpation  Resp: normal respiratory effort without conversational dyspnea   Psych: normal mood and affect  Gait: normal steady gait with appropriate coordination and balance  Neuro: normal light touch sensory exam of the bilateral upper extremities.    MSK:  BILATERAL SHOULDER  Inspection:    no atrophy  Palpation:    Diffusely tender about the entire shoulder, upper back/medial border of scapula, upper neck and anterior chest.    Active Range of Motion:     Abduction 1050, FF 1350, , IR painful.      Same Abd and ER with passive motion    Scapular dyskinesis present  Strength:    Scapular plane abduction 5-/5,  ER 5-/5, IR 5-/5, biceps 5-/5, triceps 5-/5    Independent visualization of the below image:    2 views bilateral shoulder radiographs 1/18/2019 11:32 AM     History: Bilateral shoulder pain, unspecified chronicity; Bilateral  shoulder pain, unspecified chronicity     Comparison: Chest radiographs October 17, 2018     Findings:     AP, and transscapular Y views of the each shoulder were  obtained.  Transscapular Y view is suboptimally profiled.     Left:     No acute osseous abnormality. Glenohumeral and acromioclavicular  joints are congruent.     Moderate degenerative changes of the acromioclavicular joint. No  substantial degenerative change of the glenohumeral joint.     Soft tissue is unremarkable.      Left:     No acute osseous abnormality. Glenohumeral and acromioclavicular  joints are congruent.     No substantial degenerative changes of the acromioclavicular joint. No  substantial degenerative change of the glenohumeral joint.     Degenerative changes of spine.     Diffuse prominent interstitial markings and indistinct pulmonary  vessels, may be related to interstitial pulmonary edema.                                                                      Impression:  1. No acute osseous abnormality.  2. Moderate right acromioclavicular joint osteoarthrosis.  3. Diffuse prominent interstitial markings and indistinct pulmonary  vessels, may be related to interstitial pulmonary edema.     ELIJAH BRAYDON    Patient's conditions were thoroughly discussed during today's visit with greater than 50% of the visit spent counseling the patient with total time spent face-to-face with the patient being 30 minutes.    Naveen Beltran DO [facility]      Again, thank you for allowing me to participate in the care of your patient.        Sincerely,        Naveen Beltran DO

## 2019-04-12 NOTE — PROGRESS NOTES
ASSESSMENT & PLAN    1. Chronic pain syndrome    2. Acute pain of both shoulders    3. Bilateral myofascial pain      Presents with acute bilateral shoulder pain with significant myofascial component  Exam is non-focal. Low suspicion for rotator cuff tearing  Recommend pain physical therapy to address myofascial pain which is most causative  Early frozen shoulder could also be causing some decreased range of motion  Need to be sure she can manage blood sugars before doing a cortisone injection.   Would start with therapy first and if not improving can proceed with injection and would recommend one shoulder at a time.  Patient appears sleepy and somewhat slowed in speech. Asked if this was normal / any medication changes. Indicated no changes in medications.     Follow-up if pain is not improved after 4 therapy visits for cortisone injection.    -----    SUBJECTIVE  Nena Tang is a/an 59 year old Left handed female who is seen in consultation at the request of  Eladio Kwong M.D. for evaluation of bilateral shoulder pain. The patient is seen by themselves.    Patient is currently under the care of Dr. Kwong for autoimmune disease and is being sent over to evaluated for possible other causes of shoulder pain other than autoimmune issues.     Onset: 2 month(s) ago. Reports insidious onset without acute precipitating event.  Location of Pain: bilateral shoulder pain - diffuse  Rating of Pain at worst: 8/10  Rating of Pain Currently: 7/10  Worsened by: any movement of the shoulders/arms  Better with: keeping arms rested at her sides  Treatments tried: Tylenol, other medications: Prednisone (Medrol)  Associated symptoms: weakness in left arm, loss of ROM/function due to pain  Orthopedic history: currently seeing rheumatology for possible autoimmune disease  Relevant surgical history: NO  Patient Social History: unemployed    Patient's past medical, surgical, social, and family histories were  reviewed today and no changes are noted.    REVIEW OF SYSTEMS:  10 point ROS is negative other than symptoms noted above in HPI, Past Medical History or as stated below  Constitutional: NEGATIVE for fever, chills, change in weight  Skin: NEGATIVE for worrisome rashes, moles or lesions  GI/: NEGATIVE for bowel or bladder changes  Neuro: NEGATIVE for weakness, dizziness or paresthesias    OBJECTIVE:  /70   Ht 1.524 m (5')   Wt 108.4 kg (239 lb)   LMP 01/06/2015   BMI 46.68 kg/m     General: healthy, alert and in no distress  HEENT: no scleral icterus or conjunctival erythema  Skin: no suspicious lesions or rash. No jaundice.  CV: regular rhythm by palpation  Resp: normal respiratory effort without conversational dyspnea   Psych: normal mood and affect  Gait: normal steady gait with appropriate coordination and balance  Neuro: normal light touch sensory exam of the bilateral upper extremities.    MSK:  BILATERAL SHOULDER  Inspection:    no atrophy  Palpation:    Diffusely tender about the entire shoulder, upper back/medial border of scapula, upper neck and anterior chest.    Active Range of Motion:     Abduction 1050, FF 1350, , IR painful.      Same Abd and ER with passive motion    Scapular dyskinesis present  Strength:    Scapular plane abduction 5-/5,  ER 5-/5, IR 5-/5, biceps 5-/5, triceps 5-/5    Independent visualization of the below image:    2 views bilateral shoulder radiographs 1/18/2019 11:32 AM     History: Bilateral shoulder pain, unspecified chronicity; Bilateral  shoulder pain, unspecified chronicity     Comparison: Chest radiographs October 17, 2018     Findings:     AP, and transscapular Y views of the each shoulder were obtained.  Transscapular Y view is suboptimally profiled.     Left:     No acute osseous abnormality. Glenohumeral and acromioclavicular  joints are congruent.     Moderate degenerative changes of the acromioclavicular joint. No  substantial degenerative change of  the glenohumeral joint.     Soft tissue is unremarkable.      Left:     No acute osseous abnormality. Glenohumeral and acromioclavicular  joints are congruent.     No substantial degenerative changes of the acromioclavicular joint. No  substantial degenerative change of the glenohumeral joint.     Degenerative changes of spine.     Diffuse prominent interstitial markings and indistinct pulmonary  vessels, may be related to interstitial pulmonary edema.                                                                      Impression:  1. No acute osseous abnormality.  2. Moderate right acromioclavicular joint osteoarthrosis.  3. Diffuse prominent interstitial markings and indistinct pulmonary  vessels, may be related to interstitial pulmonary edema.     ELIJAH BRAYDON    Patient's conditions were thoroughly discussed during today's visit with greater than 50% of the visit spent counseling the patient with total time spent face-to-face with the patient being 30 minutes.    Naveen Beltran DO [facility]

## 2019-04-12 NOTE — PATIENT INSTRUCTIONS
1. Chronic pain syndrome    2. Acute pain of both shoulders    3. Bilateral myofascial pain      Recommend pain physical therapy to address myofascial pain which is where most of your pain is coming from  Early frozen shoulder could also be causing some decreased range of motion  Need to be sure you can manage your blood sugars before doing a cortisone injection.   Would start with therapy first and if not improving can proceed with injection and would recommend one shoulder at a time.    Follow-up if pain is not improved after 4 therapy visits for cortisone injection.

## 2019-04-15 ENCOUNTER — OFFICE VISIT (OUTPATIENT)
Dept: ENDOCRINOLOGY | Facility: CLINIC | Age: 59
End: 2019-04-15
Payer: MEDICARE

## 2019-04-15 ENCOUNTER — ALLIED HEALTH/NURSE VISIT (OUTPATIENT)
Dept: SURGERY | Facility: CLINIC | Age: 59
End: 2019-04-15
Payer: MEDICARE

## 2019-04-15 VITALS
HEIGHT: 60 IN | SYSTOLIC BLOOD PRESSURE: 127 MMHG | WEIGHT: 239.3 LBS | BODY MASS INDEX: 46.98 KG/M2 | DIASTOLIC BLOOD PRESSURE: 69 MMHG | HEART RATE: 80 BPM | OXYGEN SATURATION: 94 %

## 2019-04-15 DIAGNOSIS — E66.01 MORBID OBESITY (H): Primary | ICD-10-CM

## 2019-04-15 RX ORDER — TOPIRAMATE 25 MG/1
TABLET, FILM COATED ORAL
Qty: 90 TABLET | Refills: 3 | Status: SHIPPED | OUTPATIENT
Start: 2019-04-15 | End: 2019-04-17 | Stop reason: DRUGHIGH

## 2019-04-15 ASSESSMENT — MIFFLIN-ST. JEOR: SCORE: 1586.96

## 2019-04-15 NOTE — PROGRESS NOTES
Nena Tang is a 59 year old female presents today for new weight management nutrition consultation.  Patient referred by Debbie PRECIADO.    Patient has Medicare and history of diabetes, ABN signed 4/15/19-4/15/2020.    Estimated body mass index is 46.74 kg/m  as calculated from the following:    Height as of an earlier encounter on 4/15/19: 1.524 m (5').    Weight as of an earlier encounter on 4/15/19: 108.5 kg (239 lb 4.8 oz).     Nutrition history  See MD note for details.  Lives in a group home  Lactose intolerant - unable to drink milk on its own can have some ice cream  Recent food recall:  Breakfast: skip most days - eggs and toast or sausage eggs and toast or [employer] sausage and egg with cheese or cereal with milk(1%)  Lunch: from group home - spaghetti, cheez nips, orange, granola bar (leftovers) yesterday - [facility]  Dinner: prepared by group home - fish with rice and salad  Snacks: once per month cupcakes(2), candy(1 super snickers), chips(regular bag of cheetos)(binge)  Beverages: water, diet pepsi, juice(SF) 3-4 8oz glasses per day, coffee 2 cups(sugar 2.5 spoons)  Dinning out: three times per week - [employer], [employer] or [employer](sundays)    Nutrition Prescription  Decrease calorie containing beverages, increased protein decreased carbohydrates    Nutrition Diagnosis  Obesity r/t long history of self-monitoring deficit and excessive energy intake aeb BMI >30.     Nutrition Intervention  Materials/education provided.  Patient lives in a group home and reported that meals are prepared and served for her.  Discussed with patient meals, snacks, and beverages that would be providing additional calories to her diet making weight loss difficult.  Patient reported that she will binge once per month by having her son bring cupcakes, chips and candy, encouraged limiting amount that she has her son bring.  Discussed calories from beverages, patient will add sugar to coffee while at the drop [facility] center  encouraged limited sugar added or avoiding coffee altogether.  Patient wished to also try decreasing the amount of diet pepsi she drinks at the drop in center.    Patient Understanding: fair/good  Expected Compliance: fair/good     Nutrition Goals  1) Reduce calorie containing beverages - cut out coffee at [facility] in [place]  2) Reduce diet pepsi by 50%, drink one bottle per day  3) Limit to one sweet treat from son per month    Follow-Up:  PRN    Time spent with patient: 30 minutes.  Belen Oliver, ROLANDO, LD

## 2019-04-15 NOTE — LETTER
"4/15/2019       RE: Nena Tang  [address] [facility] [address]     Dear Colleague,    Thank you for referring your patient, Nena Tang, to the [facility] MANAGEMENT at [facility]. Please see a copy of my visit note below.    Return Medical Weight Management Note     Nena Tang  MRN:  [id number]  :  1960  GINNY:  4/15/2019    Dear Rowena Haas,    I had the pleasure of seeing your patient Nena Tang.  She is a 59 year old female who I am continuing to see for treatment of obesity related to:       2018   I have the following co-morbidities associated with obesity: Type II Diabetes, Pre-Diabetes, Heart Disease, High Blood Pressure, High Cholesterol     New consult 18 and weight was 237 lbs  Lantus 48 units at bedtime and novolog 20 units TID with meals and Trulicity  Dx with Type II DM over 20 yrs ago  Last A1C 6.4 18   No low blood sugars    INTERVAL HISTORY:  Last visit 19.  Last visit increased topiramate but stopped being covered by insurance.  This last month she has been on steroids for her shoulder for the last month.  Plan for upcoming steroid injections.        CURRENT WEIGHT:   239 lbs 4.8 oz    Wt Readings from Last 4 Encounters:   04/15/19 108.5 kg (239 lb 4.8 oz)   19 108.4 kg (239 lb)   19 108.4 kg (239 lb)   19 108.6 kg (239 lb 8 oz)       Height:  5' 0\"  Body Mass Index:  Body mass index is 46.74 kg/m .  Vitals:  /69   Pulse 80   Ht 1.524 m (5')   Wt 108.5 kg (239 lb 4.8 oz)   LMP 2015   SpO2 94%   BMI 46.74 kg/m         Initial consult weight was 237 on 18.  Weight change since last seen is up 1 pounds.   Total gain is 2 pounds.    Diet and Activity Changes Since Last Visit Reviewed With Patient 4/15/2019   I have made the following changes to my diet since my last visit: no   With regards to my diet, I am still struggling with: -   I have made the " following changes to my activity/exercise since my last visit: -   With regards to my activity/exercise, I am still struggling with: -       MEDICATIONS:   Current Outpatient Medications   Medication     acetaminophen (TYLENOL) 500 MG tablet     albuterol (PROAIR HFA/PROVENTIL HFA/VENTOLIN HFA) 108 (90 Base) MCG/ACT Inhaler     aspirin (ASA) 81 MG EC tablet     atorvastatin (LIPITOR) 80 MG tablet     benztropine (COGENTIN) 0.5 MG tablet     blood glucose (NO BRAND SPECIFIED) test strip     calcium carbonate (OS-ALLEN) 1500 (600 Ca) MG tablet     cholecalciferol (VITAMIN D3) 01045 units (1250 mcg) capsule     Continuous Blood Gluc  (FREESTYLE LEBRON 14 DAY READER) RO     Continuous Blood Gluc Sensor (FREESTYLE LEBRON 14 DAY SENSOR) MISC     diclofenac (VOLTAREN) 1 % topical gel     dulaglutide (TRULICITY) 1.5 MG/0.5ML pen     gabapentin (NEURONTIN) 300 MG capsule     glucose 4 G CHEW chewable tablet     insulin aspart (NOVOLOG FLEXPEN) 100 UNIT/ML pen     insulin glargine (LANTUS SOLOSTAR) 100 UNIT/ML pen     insulin pen needle (NOVOFINE 30) 30G X 8 MM miscellaneous     ipratropium - albuterol 0.5 mg/2.5 mg/3 mL (DUONEB) 0.5-2.5 (3) MG/3ML neb solution     leflunomide (ARAVA) 20 MG tablet     losartan (COZAAR) 50 MG tablet     melatonin 5 MG CAPS     metoprolol succinate (TOPROL-XL) 25 MG 24 hr tablet     order for DME     paliperidone ER (INVEGA) 9 MG 24 hr tablet     polyethylene glycol (MIRALAX/GLYCOLAX) powder     predniSONE (DELTASONE) 5 MG tablet     ranitidine (ZANTAC) 300 MG tablet     senna-docusate (SENOKOT-S;PERICOLACE) 8.6-50 MG per tablet     sertraline (ZOLOFT) 100 MG tablet     spacer ([facility]ICIA) holding chamber     SUMAtriptan (IMITREX) 25 MG tablet     topiramate (TOPAMAX) 25 MG tablet     topiramate (TOPAMAX) 50 MG tablet     No current facility-administered medications for this visit.        Weight Loss Medication History Reviewed With Patient 4/15/2019   Which weight loss  medications are you currently taking on a regular basis?  -   Are you having any side effects from the weight loss medication that we have prescribed you? No       ASSESSMENT:   59 y.o. Female here for [facility] follow up.  Last visit increased topiramate but stopped being covered by insurance.  This last month she has been on steroids for her shoulder for the last month.  Plan for upcoming steroid injections.      PLAN:   Restart topiramate, not covered by medicare. Patient will see what out of pocket cost is  Will discuss with [facility] pharmacist other options. Change trulicity to victoza or ozempic for improved weight loss?    FOLLOW-UP:    4 weeks with Lauren Bloch [facility] pharmacist, schedule at  (same day as dietitian)  See dietitian in 1 month     Time: 15 min spent on evaluation, management, counseling, education, & motivational interviewing with greater than 50 % of the total time was spent on counseling and coordinating care    Sincerely,    Debbie Germain PA-C

## 2019-04-15 NOTE — PATIENT INSTRUCTIONS
PLAN:   Restart topiramate, not covered by medicare. Patient will see what out of pocket cost is  Will discuss with [facility] pharmacist other options. Change trulicity to victoza or ozempic for improved weight loss?    FOLLOW-UP:    4 weeks with Lauren Bloch [facility] pharmacist, schedule at  (same day as dietitian)  See dietitian in 1 month   MEDICATION STARTED AT THIS APPOINTMENT  We are starting topiramate at bedtime.  Start one tab, 25 mg, for a week. Go up to 50 mg (2 tabs) for the next week. At the third week, take   3 tabs (75 mg).  Stay at 3 tabs until you are seen again. Call the nurse at [phone] if you have any questions or concerns. (Do not stop taking it if you don't think it's working. For some people it works even though they do not feel much different.)    Topiramate (Topamax) is a medication that is used most often to treat migraine headaches or for seizures. It has also been found to help with weight loss. Although it's not currently FDA approved for weight loss, it has been used safely for a number of years to help people who are carrying extra weight.     Just how topiramate helps with weight loss has not been exactly determined. However it seems to work on areas of the brain to quiet down signals related to eating.      Topiramate may make you:    >feel less interest in eating in between meals   >think less about food and eating   >find it easier to push the plate away   >find giving up pop easier    >have an easier time eating less    For some of our patients, the pills work right away. They feel and think quite differently about food. Other patients don't feel much of a change but find in fact they have lost weight! Like all weight loss medications, topiramate works best when you help it work.  This means:    1) Have less tempting high calorie (fattening) food around the house or office    2) Have lower calorie food (fruits, vegetables,low fat meats and dairy) for snacks    3) Eat out only  one time or less each week.   4) Eat your meals at a table with the TV or computer off.    Side-effects. Topiramate is generally well tolerated. The main side-effects we see are:   Tingling in hands,feet, or face (usually not very troublesome)   Mental confusion and word finding trouble (about 10% of patients have this.)     Feeling sleepy or a bit dopey- this goes away very soon after starting.    One of the dangers of topiramate is the possibility of birth defects--if you get pregnant when you are on it, there is the risk that your baby will be born with a cleft lip or palate.  If you are on topiramate and of child bearing age, you need to be on a reliable form of birth control or refrain from sexual intercourse.     Please refer to the pharmacy insert for more information on side-effects. Since many pharmacists are not familiar with the use of topiramate in weight loss, calling the clinic will get you the most accurate information on the use of this medication for weight loss.     In order to get refills of this or any medication we prescribe you must be seen in the medical weight mgmt clinic every 2-3 months. Please have your pharmacy fax a refill request to [phone].

## 2019-04-15 NOTE — PROGRESS NOTES
"Return Medical Weight Management Note     Nena Tang  MRN:  [id number]  :  1960  GINNY:  4/15/2019    Dear Rowena Haas,    I had the pleasure of seeing your patient Nena Tang.  She is a 59 year old female who I am continuing to see for treatment of obesity related to:       2018   I have the following co-morbidities associated with obesity: Type II Diabetes, Pre-Diabetes, Heart Disease, High Blood Pressure, High Cholesterol     New consult 18 and weight was 237 lbs  Lantus 48 units at bedtime and novolog 20 units TID with meals and Trulicity  Dx with Type II DM over 20 yrs ago  Last A1C 6.4 18   No low blood sugars    INTERVAL HISTORY:  Last visit 19.  Last visit increased topiramate but stopped being covered by insurance.  This last month she has been on steroids for her shoulder for the last month.  Plan for upcoming steroid injections.        CURRENT WEIGHT:   239 lbs 4.8 oz    Wt Readings from Last 4 Encounters:   04/15/19 108.5 kg (239 lb 4.8 oz)   19 108.4 kg (239 lb)   19 108.4 kg (239 lb)   19 108.6 kg (239 lb 8 oz)       Height:  5' 0\"  Body Mass Index:  Body mass index is 46.74 kg/m .  Vitals:  /69   Pulse 80   Ht 1.524 m (5')   Wt 108.5 kg (239 lb 4.8 oz)   LMP 2015   SpO2 94%   BMI 46.74 kg/m        Initial consult weight was 237 on 18.  Weight change since last seen is up 1 pounds.   Total gain is 2 pounds.    Diet and Activity Changes Since Last Visit Reviewed With Patient 4/15/2019   I have made the following changes to my diet since my last visit: no   With regards to my diet, I am still struggling with: -   I have made the following changes to my activity/exercise since my last visit: -   With regards to my activity/exercise, I am still struggling with: -       MEDICATIONS:   Current Outpatient Medications   Medication     acetaminophen (TYLENOL) 500 MG tablet     albuterol (PROAIR HFA/PROVENTIL HFA/VENTOLIN HFA) 108 " (90 Base) MCG/ACT Inhaler     aspirin (ASA) 81 MG EC tablet     atorvastatin (LIPITOR) 80 MG tablet     benztropine (COGENTIN) 0.5 MG tablet     blood glucose (NO BRAND SPECIFIED) test strip     calcium carbonate (OS-ALLEN) 1500 (600 Ca) MG tablet     cholecalciferol (VITAMIN D3) 05893 units (1250 mcg) capsule     Continuous Blood Gluc  (FREESTYLE LEBRON 14 DAY READER) RO     Continuous Blood Gluc Sensor (FREESTYLE LEBRON 14 DAY SENSOR) MISC     diclofenac (VOLTAREN) 1 % topical gel     dulaglutide (TRULICITY) 1.5 MG/0.5ML pen     gabapentin (NEURONTIN) 300 MG capsule     glucose 4 G CHEW chewable tablet     insulin aspart (NOVOLOG FLEXPEN) 100 UNIT/ML pen     insulin glargine (LANTUS SOLOSTAR) 100 UNIT/ML pen     insulin pen needle (NOVOFINE 30) 30G X 8 MM miscellaneous     ipratropium - albuterol 0.5 mg/2.5 mg/3 mL (DUONEB) 0.5-2.5 (3) MG/3ML neb solution     leflunomide (ARAVA) 20 MG tablet     losartan (COZAAR) 50 MG tablet     melatonin 5 MG CAPS     metoprolol succinate (TOPROL-XL) 25 MG 24 hr tablet     order for DME     paliperidone ER (INVEGA) 9 MG 24 hr tablet     polyethylene glycol (MIRALAX/GLYCOLAX) powder     predniSONE (DELTASONE) 5 MG tablet     ranitidine (ZANTAC) 300 MG tablet     senna-docusate (SENOKOT-S;PERICOLACE) 8.6-50 MG per tablet     sertraline (ZOLOFT) 100 MG tablet     spacer (OPTICHAMBER PATRICIA) holding chamber     SUMAtriptan (IMITREX) 25 MG tablet     topiramate (TOPAMAX) 25 MG tablet     topiramate (TOPAMAX) 50 MG tablet     No current facility-administered medications for this visit.        Weight Loss Medication History Reviewed With Patient 4/15/2019   Which weight loss medications are you currently taking on a regular basis?  -   Are you having any side effects from the weight loss medication that we have prescribed you? No       ASSESSMENT:   59 y.o. Female here for [facility] follow up.  Last visit increased topiramate but stopped being covered by insurance.  This last month  she has been on steroids for her shoulder for the last month.  Plan for upcoming steroid injections.      PLAN:   Restart topiramate, not covered by medicare. Patient will see what out of pocket cost is  Will discuss with [facility] pharmacist other options. Change trulicity to victoza or ozempic for improved weight loss?    FOLLOW-UP:    4 weeks with Lauren Bloch [facility] pharmacist, schedule at  (same day as dietitian)  See dietitian in 1 month     Time: 15 min spent on evaluation, management, counseling, education, & motivational interviewing with greater than 50 % of the total time was spent on counseling and coordinating care    Sincerely,    Debbie Germain PA-C

## 2019-04-15 NOTE — PATIENT INSTRUCTIONS
Nutrition Goals  1) Reduce calorie containing beverages - cut out coffee at drop in center  2) Reduce diet pepsi by 50%, drink one bottle per day  3) Limit to one sweet treat from son per month    Follow up with RD in one month    Belen Oliver RD, LD  If you need to schedule or reschedule with a dietitian please call [phone].

## 2019-04-15 NOTE — NURSING NOTE
Chief Complaint   Patient presents with     Weight Problem     RMWM     Vitals:    04/15/19 1040   BP: 127/69   Pulse: 80   SpO2: 94%   Weight: 108.5 kg (239 lb 4.8 oz)   Height: 1.524 m (5')     Body mass index is 46.74 kg/m .  Татьяна Munoz CMA

## 2019-04-17 DIAGNOSIS — E66.01 MORBID OBESITY (H): Primary | ICD-10-CM

## 2019-04-17 RX ORDER — TOPIRAMATE 50 MG/1
75 TABLET, FILM COATED ORAL 2 TIMES DAILY
Qty: 90 TABLET | Refills: 3 | Status: SHIPPED | OUTPATIENT
Start: 2019-04-17 | End: 2019-08-26

## 2019-04-17 NOTE — TELEPHONE ENCOUNTER
Pharmacy called to clarify recent script sent over on 4/15/19. Patient was seen in clinic with Debbie Germain and had stated that she was no longer taking Topiramate due to insurance coverage. Pharmacy called wanting dose clarification. Patient had been receiving Topiramate 50mg tablets, taking 1 and 1/2 tablets BID in a pill pack. Called and spoke with facility where patient lives and has meds given to her. Caregiver confirmed that patient has been taking Topiramate 75mg twice daily. Per Debbie Germain, will send new rx with current dose.    
Detail Level: Detailed
Include Location In Plan?: No

## 2019-04-19 ENCOUNTER — TELEPHONE (OUTPATIENT)
Dept: FAMILY MEDICINE | Facility: CLINIC | Age: 59
End: 2019-04-19

## 2019-04-19 NOTE — TELEPHONE ENCOUNTER
Ok to Give Nena her 2pm dose of Neurontin 300 mg caps. Give 2 caps for 600 mg  at her day treatment programs.    Wendy Schwartz RN for Rowena JIMENEZ

## 2019-04-19 NOTE — TELEPHONE ENCOUNTER
Reason for Call: Request for a written order     Order: [facility] is requesting for the pt to take her afternoon dose of gabapentin (NEURONTIN) 300 MG capsule when she is at her day program because by the time the pt returns to her group home her afternoon dose and her PM dose are too close together. [facility] is requesting that a written order be faxed to [phone]     Date needed: as soon as possible     Additional comments: If any questions please reach out to [facility] () @ [phone] (okay to leave a detailed message) or [facility] @ [phone] (okay to leave a detailed message. Per [facility], try [facility] first. Fax can be addressed to either or both.      Can we leave a detailed message on this number?  YES

## 2019-04-26 ENCOUNTER — MEDICAL CORRESPONDENCE (OUTPATIENT)
Dept: HEALTH INFORMATION MANAGEMENT | Facility: CLINIC | Age: 59
End: 2019-04-26

## 2019-04-30 ENCOUNTER — OFFICE VISIT (OUTPATIENT)
Dept: FAMILY MEDICINE | Facility: CLINIC | Age: 59
End: 2019-04-30
Payer: MEDICARE

## 2019-04-30 ENCOUNTER — PATIENT OUTREACH (OUTPATIENT)
Dept: CARE COORDINATION | Facility: CLINIC | Age: 59
End: 2019-04-30

## 2019-04-30 VITALS
HEART RATE: 85 BPM | RESPIRATION RATE: 16 BRPM | SYSTOLIC BLOOD PRESSURE: 102 MMHG | DIASTOLIC BLOOD PRESSURE: 60 MMHG | WEIGHT: 240.5 LBS | OXYGEN SATURATION: 98 % | BODY MASS INDEX: 47.22 KG/M2 | HEIGHT: 60 IN

## 2019-04-30 DIAGNOSIS — E11.22 TYPE 2 DIABETES MELLITUS WITH STAGE 3 CHRONIC KIDNEY DISEASE, WITH LONG-TERM CURRENT USE OF INSULIN (H): ICD-10-CM

## 2019-04-30 DIAGNOSIS — Z79.4 TYPE 2 DIABETES MELLITUS WITH STAGE 3 CHRONIC KIDNEY DISEASE, WITH LONG-TERM CURRENT USE OF INSULIN (H): ICD-10-CM

## 2019-04-30 DIAGNOSIS — M06.4 INFLAMMATORY POLYARTHROPATHY (H): ICD-10-CM

## 2019-04-30 DIAGNOSIS — N18.30 TYPE 2 DIABETES MELLITUS WITH STAGE 3 CHRONIC KIDNEY DISEASE, WITH LONG-TERM CURRENT USE OF INSULIN (H): ICD-10-CM

## 2019-04-30 DIAGNOSIS — M25.512 CHRONIC PAIN OF BOTH SHOULDERS: ICD-10-CM

## 2019-04-30 DIAGNOSIS — G89.29 CHRONIC PAIN OF BOTH SHOULDERS: ICD-10-CM

## 2019-04-30 DIAGNOSIS — F25.1 SCHIZOAFFECTIVE DISORDER, DEPRESSIVE TYPE (H): ICD-10-CM

## 2019-04-30 DIAGNOSIS — R05.9 COUGH: Primary | ICD-10-CM

## 2019-04-30 DIAGNOSIS — M25.511 CHRONIC PAIN OF BOTH SHOULDERS: ICD-10-CM

## 2019-04-30 PROCEDURE — 99214 OFFICE O/P EST MOD 30 MIN: CPT | Performed by: NURSE PRACTITIONER

## 2019-04-30 RX ORDER — FLASH GLUCOSE SCANNING READER
1 EACH MISCELLANEOUS 4 TIMES DAILY
Qty: 1 DEVICE | Refills: 0 | Status: SHIPPED | OUTPATIENT
Start: 2019-04-30 | End: 2019-06-04

## 2019-04-30 RX ORDER — GUAIFENESIN 200 MG/10ML
200 LIQUID ORAL EVERY 4 HOURS PRN
Qty: 560 ML | Refills: 1 | Status: SHIPPED | OUTPATIENT
Start: 2019-04-30 | End: 2019-07-28

## 2019-04-30 ASSESSMENT — ACTIVITIES OF DAILY LIVING (ADL): DEPENDENT_IADLS:: INDEPENDENT

## 2019-04-30 ASSESSMENT — MIFFLIN-ST. JEOR: SCORE: 1592.4

## 2019-04-30 NOTE — PATIENT INSTRUCTIONS
We have reordered your Gabby Freestyle     We will have our [facility] follow up on the waiver needed so you can stay in your housing and adm your own insulin    We have reordered your cough medicine           Patient Education     GERD (Adult)    The esophagus is a tube that carries food from the mouth to the stomach. A valve (the LES, lower esophageal sphincter) at the lower end of the esophagus prevents stomach acid from flowing upward. When this valve doesn't work properly, stomach contents may repeatedly flow back up (reflux) into the esophagus. This is called gastroesophageal reflux disease (GERD). GERD can irritate the esophagus. It can cause problems with pain, swallowing or breathing. In severe cases, GERD can cause recurrent pneumonia (from aspiration or breathing in particles) or other serious problems.  Symptoms of reflux include burning, pressure or sharp pain in the upper abdomen or mid to lower chest. The pain can spread to the neck, back, or shoulder. There may be belching, an acid taste in the back of the throat, chronic cough, or sore throat, or hoarseness. GERD symptoms often occur during the day after a big meal. They can also occur at night when lying down.   Home care  Lifestyle changes can help reduce symptoms. If needed, your healthcare provider may prescribe medicines. Symptoms often improve with treatment, but if treatment is stopped, the symptoms often return after a few months. So most persons with GERD will need to continue treatment or get treatment on and off.  Lifestyle changes    Limit or avoid fatty, fried, and spicy foods, as well as coffee, chocolate, mint, and foods with high acid content such as tomatoes and citrus fruit and juices (orange, grapefruit, lemon).    Don t eat large meals, especially at night. Frequent, smaller meals are best. Don't lie down right after eating. And don t eat anything 3 hours before going to bed.    Don't drink alcohol or smoke. As much as possible, stay  "away from second hand smoke.    If you are overweight, losing weight will reduce symptoms.     Don't wear tight clothing around your stomach area.    If your symptoms occur during sleep, use a foam wedge to elevate your upper body (not just your head.) Or, place 4\" blocks under the head of your bed. Or use 2 bed risers under your bedframe.  Medicines  If needed, medicines can help relieve the symptoms of GERD and prevent damage to the esophagus. Discuss a medicine plan with your healthcare provider. This may include one or more of the following medicines:    Antacids to help neutralize the normal acids in your stomach.    Acid blockers (Histamine or H2 blockers) to decrease acid production.    Acid inhibitors (proton pump inhibitors PPIs) to decrease acid production in a different way than the blockers. They may work better, but can take a little longer to take effect.  Take an antacid 30 to 60 minutes after eating and at bedtime, but not at the same time as an acid blocker.Try not to take medicines such as ibuprofen and aspirin. If you are taking aspirin for your heart or other medical reasons, talk to your healthcare provider about stopping it.  Follow-up care  Follow up with your healthcare provider or as advised by our staff.  When to seek medical advice  Call your healthcare provider if any of the following occur:    Stomach pain gets worse or moves to the lower right abdomen (appendix area)    Chest pain appears or gets worse, or spreads to the back, neck, shoulder, or arm    An over-the-counter trial of medicine doesn't relieve your symptoms    Weight loss that can't be explained    Trouble or pain swallowing    Frequent vomiting (can t keep down liquids)    Blood in the stool or vomit (red or black in color)    Feeling weak or dizzy    Fever of 100.4 F (38 C) or higher, or as directed by your healthcare provider  Date Last Reviewed: 3/1/2018    [id number] [employer]. [address], " [place] [address]. All rights reserved. This information is not intended as a substitute for professional medical care. Always follow your healthcare professional's instructions.

## 2019-04-30 NOTE — PROGRESS NOTES
Clinic Care Coordination Contact  Care Team Conversations    SW met face to face with pt in the clinic. Pt is currently living at a non skilled nursing facility. Pt has been administering her own insulin and checking her own sugars. Pt's care giver (Abigail Barnes) reported that there has recently been an audit and the county and stated report that pt's diabetes management needs to be administered by staff. This facility is not comfortable providing this as they do not have the correct license to help with diabetes management.  Pt is able to independently check her sugars and administer insulin as this is what she has been doing for the last 3 years.     JESSICA contacted Vidya [place] at [phone] to talk with the state advocating that Pt is able to independently manage her diabetes. If they do not approve of this Pt may have to find alternative housing.     Pt gave Verbal Release to talk with [facility] and [facility].     Plan: 1) SW will wait to hear back from [facility].   2) PT will follow up with SW with any questions.     MACIEL Livingston  Clinic Care Coordinator   [facility]   [phone]

## 2019-04-30 NOTE — PROGRESS NOTES
SUBJECTIVE:                                                    Nena Tang is a 59 year old female who presents to clinic today for the following health issues:    Pt here for consult on forms. Patient is in a non skilled facility and she is at risk of losing her housing due to her insulin administration, she is independent in all her cares and gives her own insulin, they are trying to get waiver to say she can adm her own Insulin and are working with the state, currently the [facility] and [facility] are saying that the caregivers have to adm the medication, contact at the State is Eleanor Fofana [phone]        Pt reports she  Also has a few questions to ask PCP.     Diabetes Follow-up  Patient is on prednisone for polyarthralgia. Scheduled to follow-up with Rheumatology for pain management and diagnosis. See note below:     Patient is checking blood sugars: 4 times daily.      Blood sugar testing frequency justification: Not controlled due to prednisone,     Diabetic concerns: None and blood sugar frequently over 200     Symptoms of hypoglycemia (low blood sugar): shaky, dizzy, weak     Paresthesias (numbness or burning in feet) or sores: No     Date of last diabetic eye exam: 2 weeks ago    Patient has Type 2 Diabetes  Diabetes medications: Lantus, Trulicity and Novolog     Lab Results   Component Value Date    A1C 6.4 12/20/2018       Blood glucose tests per day 4  Reasons for blood glucose testing more than one time a day for oral medication managed diabetes or 3 times a day for insulin managed diabetes: multiple daily insulin injections  Blood glucose logbook was: Not available  Changes in treatment: no changes to medication  Patient instructed to keep a log of blood glucose values and return in 6 months for office visit and testing supply refills.       Rheumatology visit 4/9/19       Assessment and Plan:       # Steroid responsive polyarthralgia - onset 2018  # Positive AIDA 1:160 centromere pattern  #  Anemia   # Elevated sed rate  # Osteopenia  # Coronary artery disease; h/o takotsuba cardiomyopathy  # Type 2 diabetes mellitus  # H/o uterine cancer - decades ago  # Past h/o suicidal ideation; past h/o cocaine/heroin use  # Retinopathy due to Diabetes mellitus     Creat, AST, ALT within normal limits   CK within normal limits  RF, CCP neg.   TSH within normal limits  Vit D within normal limits     TPMT enzyme not normal.   Lupus activity labs are all normal.     Some of the specific antibodies for lupus, mixed connective tissue disease, scleroderma, sjogrens syndrome are normal (negative)   Sed rate is improving. Good.      There is some suspicion for autoimmune connective tissue disorder. No classic synovitis.      Polyarthralgia still uncontrolled on Prednisone 10 mg PO daily started 2/19 by Dr. Carr. Tapered down to 20mg PO daily and pain is worse with the taper.      Meloxicam did not help.      We will avoid plaquenil because of retinopathy. Avoid azathioprine because of abnormal TPMT.   Leflunomide started about 2 weeks ago. Tolerating okay. Continue 20 mg PO daily.   Labs every month X 3.      Referral to sports medicine for shoulder assessment for non-autoimmune causes of pain.      The labs from patient records are reviewed.      I will be back in touch with the patient through mychart/letter when results are available.      Patient agrees with the above mentioned treatment plan.      ASSESSMENT & PLAN, visit with Sports Medicine      1. Chronic pain syndrome    2. Acute pain of both shoulders    3. Bilateral myofascial pain       Presents with acute bilateral shoulder pain with significant myofascial component  Exam is non-focal. Low suspicion for rotator cuff tearing  Recommend pain physical therapy to address myofascial pain which is most causative  Early frozen shoulder could also be causing some decreased range of motion  Need to be sure she can manage blood sugars before doing a cortisone  injection.   Would start with therapy first and if not improving can proceed with injection and would recommend one shoulder at a time.  Patient appears sleepy and somewhat slowed in speech. Asked if this was normal / any medication changes. Indicated no changes in medications.      Follow-up if pain is not improved after 4 therapy visits for cortisone injection.     Weight management visit:  ASSESSMENT:   59 y.o. Female here for [facility] follow up.  Last visit increased topiramate but stopped being covered by insurance.  This last month she has been on steroids for her shoulder for the last month.  Plan for upcoming steroid injections.       PLAN:   Restart topiramate, not covered by medicare. Patient will see what out of pocket cost is  Will discuss with [facility] pharmacist other options. Change trulicity to victoza or ozempic for improved weight loss?     FOLLOW-UP:    4 weeks with Lauren Bloch [facility] pharmacist, schedule at  (same day as dietitian)  See dietitian in 1 month      COUGH  Duration of complaint: chronic off and on, using cough medicine with good relief but ran out    Gerd/Heartburn  Duration of complaint: chronic on and off, taking Zantac 300mg at bedtime, discussed diet         Mental Health Follow up/ depression/anxiety    Status since last visit: ups and downs, worried about housing     See PHQ-9 for current symptoms.  Other associated symptoms: None    Complicating factors: pain,   Significant life event:  No   Current substance abuse:  None  Anxiety or Panic symptoms:  No    Sleep - poor not using CPAP, afraid, will discuss with her Psych, Dr. Pedersen  Appetite - ok, trying to follow DM diet  Exercise - going to pain PT    Smoking - no  Alcohol - no  Street drugs - no  Marijuana - no  Caffeine - occ    PHQ-9  English PHQ-9   Any Language               Problems taking medications regularly: No    Medication side effects: none    Diet: diabetic    Social History     Tobacco Use     Smoking status:  Never Smoker     Smokeless tobacco: Never Used   Substance Use Topics     Alcohol use: No     Frequency: Never     Comment: last month        Problem list and histories reviewed & adjusted, as indicated.  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 (H)     Illiterate     Irritable bowel syndrome     overweight - BMI >35     Takotsubo cardiomyopathy     CAD (coronary artery disease)     Restless legs syndrome (RLS)     CINDY (obstructive sleep apnea)- mild AHI 10.3     Verbal auditory hallucination     Chronic low back pain     Schizoaffective disorder, depressive type (H)     Migraine headache     HTN, goal below 140/90     Health Care Home     Lumbago     Cervicalgia     Cocaine abuse, episodic (H)     Suicidal ideation     Esophageal reflux     Mild nonproliferative diabetic retinopathy (H)     Tardive dyskinesia     Alcohol use     Left cataract     Falls frequently     History of uterine cancer     Psychophysiological insomnia     Dysuria     Asymptomatic postmenopausal status     Abdominal pain, right lower quadrant     Infectious encephalopathy     Non-intractable vomiting with nausea     Thoughts of self harm     Gastroenteritis     Posttraumatic stress disorder     Cervical cancer screening     Type 2 diabetes mellitus with stage 3 chronic kidney disease, with long-term current use of insulin (H)     Positive AIDA (antinuclear antibody)     Past Surgical History:   Procedure Laterality Date     C OOPHORECTORMY FOR RAHEL, W/BX  1983    UTERINE     CATARACT IOL, RT/LT Bilateral 2017     COLONOSCOPY N/A 3/16/2017    Procedure: COLONOSCOPY;  Surgeon: Traci Gonzalez MD;  Location: [facility]     Coronary CTA  5/21/2014     HYSTERECTOMY  1983    uterine cancer yearly pap's per provider.      LAPAROSCOPIC CHOLECYSTECTOMY  2008     PHACOEMULSIFICATION CLEAR CORNEA WITH STANDARD INTRAOCULAR LENS IMPLANT Left 2017    Procedure: PHACOEMULSIFICATION CLEAR CORNEA WITH STANDARD INTRAOCULAR LENS IMPLANT;  LEFT EYE PHACOEMULSIFICATION CLEAR CORNEA WITH STANDARD INTRAOCULAR LENS IMPLANT ;  Surgeon: Tyra Diaz MD;  Location:  [facility]     PHACOEMULSIFICATION CLEAR CORNEA WITH STANDARD INTRAOCULAR LENS IMPLANT Right 2017    Procedure: PHACOEMULSIFICATION CLEAR CORNEA WITH STANDARD INTRAOCULAR LENS IMPLANT;  RIGHT EYE PHACOEMULSIFICATION CLEAR CORNEA WITH STANDARD INTRAOCULAR LENS IMPLANT;  Surgeon: Tyra Diaz MD;  Location:  [facility]     RELEASE TRIGGER FINGER  10/11/2012    Left thumb. Procedure: RELEASE TRIGGER FINGER;  LEFT THUMB TRIGGER RELEASE;  Surgeon: Tay Langley MD;  Location:  [facility]     RELEASE TRIGGER FINGER Right 2016    Procedure: RELEASE TRIGGER FINGER;  Surgeon: Albino Castañeda MD;  Location: [facility]       Social History     Tobacco Use     Smoking status: Never Smoker     Smokeless tobacco: Never Used   Substance Use Topics     Alcohol use: No     Frequency: Never     Comment: last month     Family History   Problem Relation Age of Onset     Cancer Mother         BLADDER     Respiratory Mother         COPD     Gastrointestinal Disease Mother         CIRRHOSIS OF LI BOLIVAR     Alcohol/Drug Mother      Diabetes Mother      Hypertension Mother      Lipids Mother      C.A.D. Mother      Glaucoma Mother      Alcohol/Drug Sister      Mental Illness Sister      Alcohol/Drug Sister      Psychotic Disorder Sister      Cancer Maternal Grandmother         UNKNOWN TYPE     Cancer Brother         COLON     Cancer - colorectal Brother         IN HIS LATE 30S     Alcohol/Drug Brother          OF HEROIN OVERDOSE AT AGE 22 YRS     Macular Degeneration No family hx of            Current Outpatient Medications   Medication Sig Dispense Refill     acetaminophen (TYLENOL) 500 MG tablet Take  2 tablets (1,000 mg) by mouth 3 times daily as needed for mild pain 100 tablet 3     albuterol (PROAIR HFA/PROVENTIL HFA/VENTOLIN HFA) 108 (90 Base) MCG/ACT Inhaler Inhale 2 puffs into the lungs every 6 hours as needed for shortness of breath / dyspnea or wheezing 1 Inhaler 0     aspirin (ASA) 81 MG EC tablet TAKE 1 TABLET (81MG) BY MOUTH DAILY 30 tablet 3     atorvastatin (LIPITOR) 80 MG tablet TAKE 1 TABLET (80MG) BY MOUTH DAILY 30 tablet 11     benztropine (COGENTIN) 0.5 MG tablet Take 2 tablets (1 mg) by mouth 2 times daily 120 tablet 2     blood glucose (NO BRAND SPECIFIED) test strip Use to test blood sugar  4 times daily or as directed. To accompany: Blood Glucose Monitor Brands: per insurance. 100 strip 11     calcium carbonate (OS-ALLEN) 1500 (600 Ca) MG tablet Take 3 tablets (1,800 mg) by mouth daily 180 tablet 3     cholecalciferol (VITAMIN D3) 93595 units (1250 mcg) capsule TAKE 1 CAPSULE (50,000 UNITS) MONTHLY 4 capsule 3     Continuous Blood Gluc  (FREESTYLE LEBRON 14 DAY READER) RO 1 Device 3 times daily For continuous glucose monitoring. 1 Device 0     Continuous Blood Gluc Sensor (FREESTYLE LEBRON 14 DAY SENSOR) MISC 1 Device every 14 days 6 each 3     diclofenac (VOLTAREN) 1 % topical gel Place 4 g onto the skin 4 times daily as needed for moderate pain 1 Tube 1     dulaglutide (TRULICITY) 1.5 MG/0.5ML pen Inject 1.5 mg Subcutaneous every 7 days 2 mL 3     gabapentin (NEURONTIN) 300 MG capsule TAKE 2 CAPSULES (600MG) BY MOUTH IN THE MORNING AND AFTERNOON; 3 CAPSULES AT BEDTIME. 210 capsule 1     glucose 4 G CHEW chewable tablet Take 2 every 15 minutes for blood sugar <70mg/dL. Recheck blood sugar every 15 minutes until above 70mg/dL, then eat a substantial meal. 20 tablet 1     insulin aspart (NOVOLOG FLEXPEN) 100 UNIT/ML pen Inject 20 Units Subcutaneous 3 times daily (with meals) Once daily, can add additional 5 units if BGs are >500mg/dL. 15 mL 11     insulin glargine (LANTUS SOLOSTAR) 100  UNIT/ML pen Inject 48 Units Subcutaneous At Bedtime 3 mL 11     insulin pen needle (NOVOFINE 30) 30G X 8 MM miscellaneous USE 4 DAILY OR AS DIRECTED 400 each 0     ipratropium - albuterol 0.5 mg/2.5 mg/3 mL (DUONEB) 0.5-2.5 (3) MG/3ML neb solution Take 1 vial (3 mLs) by nebulization every 6 hours as needed for shortness of breath / dyspnea or wheezing 30 vial 0     leflunomide (ARAVA) 20 MG tablet Take 1 tablet (20 mg) by mouth daily Labs every 8-12 weeks 60 tablet 0     losartan (COZAAR) 50 MG tablet Take 1 tablet (50 mg) by mouth daily 90 tablet 3     melatonin 5 MG CAPS Take 2 capsules by mouth At Bedtime 180 capsule 3     metoprolol succinate (TOPROL-XL) 25 MG 24 hr tablet Take 1 tablet (25 mg) by mouth daily 90 tablet 1     order for DME Equipment being ordered: Freestyle Gabby [place] 1 Device 0     paliperidone ER (INVEGA) 9 MG 24 hr tablet Take 1 tablet (9 mg) by mouth every morning 30 tablet 2     polyethylene glycol (MIRALAX/GLYCOLAX) powder TAKE 8.5 GRAMS (1/2 CAPFUL) BY MOUTH DAILY 527 g 1     predniSONE (DELTASONE) 5 MG tablet April 9, 2019: 12.5 mg PO daily. 150 tablet 0     ranitidine (ZANTAC) 300 MG tablet TAKE 1 TABLET (300MG) BY MOUTH AT BEDTIME 90 tablet 1     senna-docusate (SENOKOT-S;PERICOLACE) 8.6-50 MG per tablet Take 1 tablet by mouth 2 times daily as needed for constipation 100 tablet 1     sertraline (ZOLOFT) 100 MG tablet Take 2 tablets (200 mg) by mouth daily 60 tablet 2     spacer (OPTICHAMBER PATRICIA) holding chamber Holding/spacer device to use with inhaler. 1 each 0     SUMAtriptan (IMITREX) 25 MG tablet Take 1-2 tablets (25-50 mg) by mouth at onset of headache for migraine May repeat in 2 hours. Max 8 tablets/24 hours. 12 tablet 1     topiramate (TOPAMAX) 50 MG tablet Take 1.5 tablets (75 mg) by mouth 2 times daily 90 tablet 3     Allergies   Allergen Reactions     Imidazole Antifungals Hives     Tolerates diflucan     Ketoprofen Itching     Pruritis to topical     Lisinopril Hives      Metformin Other (See Comments)     Patient hospitalized for lactic acidosis - admitting provider suspectd caused by metformin     Metronidazole Hives     Posaconazole Hives     Tolerates diflucan     Recent Labs   Lab Test 03/30/19  1659 02/27/19  1218 01/30/19  1215 12/20/18  1532  10/17/18  1531 08/06/18  1038  05/28/18  1625 05/22/18  1434  11/07/17  0801  06/27/17  1358  07/13/16  1350  07/14/15  1215   A1C  --   --   --  6.4*  --   --  6.4*  --   --  6.2*   < > 8.9*   < > 7.0*   < >  --    < > 9.1*   LDL  --   --   --   --   --   --  84  --   --   --   --   --   --  64  --  137*  --  78   HDL  --   --   --   --   --   --  46*  --   --   --   --   --   --  37*  --   --   --  39*   TRIG  --   --   --   --   --   --  215*  --   --   --   --   --   --  267*  --   --   --  151*   ALT  --   --  20  --   --  23  --   --  27  --    < > 23   < > 32   < >  --    < >  --    CR 1.28* 0.93 1.31* 0.99   < > 1.04  --    < > 1.16*  --    < > 0.88   < > 0.78   < >  --    < > 0.67   GFRESTIMATED 46* 68 45* 63   < > 54*  --    < > 48*  --    < > 66   < > 76   < >  --    < > >90  Non  GFR Calc     GFRESTBLACK 53* 79 52* 73   < > 66  --    < > 58*  --    < > 80   < > >90   GFR Calc     < >  --    < > >90   GFR Calc     POTASSIUM 4.8 4.4 4.7 4.2   < > 4.3  --    < > 4.5  --    < > 3.9   < > 4.3   < >  --    < > 4.3   TSH  --   --   --  1.98  --   --   --   --   --   --   --  3.21  --   --    < >  --    < >  --     < > = values in this interval not displayed.      BP Readings from Last 3 Encounters:   04/30/19 102/60   04/15/19 127/69   04/12/19 122/70    Wt Readings from Last 3 Encounters:   04/30/19 109.1 kg (240 lb 8 oz)   04/15/19 108.5 kg (239 lb 4.8 oz)   04/12/19 108.4 kg (239 lb)        Labs reviewed in EPIC  Problem list, Medication list, Allergies, and Medical/Social/Surgical histories reviewed in [facility] and updated as appropriate.     ROS: Constitutional, neuro, ENT,  endocrine, pulmonary, cardiac, gastrointestinal, genitourinary, musculoskeletal, integument and psychiatric systems are negative, except as otherwise noted above in the HPI.       OBJECTIVE:                                                    /60   Pulse 85   Resp 16   Ht 1.524 m (5')   Wt 109.1 kg (240 lb 8 oz)   LMP 01/06/2015   SpO2 98%   BMI 46.97 kg/m    Body mass index is 46.97 kg/m .  GENERAL: healthy, alert, well nourished, well hydrated, mild distress  EYES: Eyes grossly normal to inspection,  RESP: lungs clear to auscultation - no rales, no rhonchi, no wheezes  CV: regular rates and rhythm, normal S1 S2, no S3 or S4 and no murmur,  ABDOMEN: soft, no tenderness, normal bowel sounds  MS: extremities- no gross deformities noted, no edema  SKIN: no suspicious lesions, no rashes  NEURO: strength and tone- baseline, mentation- intact, speech- normal,   Mental Status Assessment:  Appearance:   Appropriate   Eye Contact:   Fair   Psychomotor Behavior: Normal  Restless   Attitude:   Cooperative   Orientation:   All  Speech   Rate / Production: Normal    Volume:  Normal   Mood:    Anxious   Affect:    Appropriate  Labile   Thought Content:  Clear  Rumination   Thought Form:  Goal Directed  Logical   Insight:    Fair   Attention Span and Concentration:  limited  Recent and Remote Memory:  intact  Fund of Knowledge: appropriate  Muscle Strength and Tone: normal   Suicidal Ideation: reports no thoughts, no intention  Hallucination: No  Paranoid-No  Manic-No  Panic-No  Self harm-No      Diagnostic Test Results:  Results for orders placed or performed during the hospital encounter of 03/30/19   Chest XR,  PA & LAT    Narrative    CHEST TWO VIEWS  3/30/2019 5:25 PM     HISTORY: Chest pain.    COMPARISON: 10/17/2018      Impression    IMPRESSION: Cardiomegaly is unchanged. Otherwise normal.    FELIPE WILLETT MD   CBC (platelets, no diff)   Result Value Ref Range    WBC 9.0 4.0 - 11.0 10e9/L    RBC Count 3.31  (L) 3.8 - 5.2 10e12/L    Hemoglobin 10.3 (L) 11.7 - 15.7 g/dL    Hematocrit 33.6 (L) 35.0 - 47.0 %     (H) 78 - 100 fl    MCH 31.1 26.5 - 33.0 pg    MCHC 30.7 (L) 31.5 - 36.5 g/dL    RDW 14.2 10.0 - 15.0 %    Platelet Count 195 150 - 450 10e9/L   Basic metabolic panel (BMP)   Result Value Ref Range    Sodium 139 133 - 144 mmol/L    Potassium 4.8 3.4 - 5.3 mmol/L    Chloride 109 94 - 109 mmol/L    Carbon Dioxide 26 20 - 32 mmol/L    Anion Gap 4 3 - 14 mmol/L    Glucose 135 (H) 70 - 99 mg/dL    Urea Nitrogen 25 7 - 30 mg/dL    Creatinine 1.28 (H) 0.52 - 1.04 mg/dL    GFR Estimate 46 (L) >60 mL/min/[1.73_m2]    GFR Estimate If Black 53 (L) >60 mL/min/[1.73_m2]    Calcium 9.0 8.5 - 10.1 mg/dL   Troponin I   Result Value Ref Range    Troponin I ES <0.015 0.000 - 0.045 ug/L   EKG 12 lead   Result Value Ref Range    Interpretation ECG Click View Image link to view waveform and result      *Note: Due to a large number of results and/or encounters for the requested time period, some results have not been displayed. A complete set of results can be found in Results Review.        ASSESSMENT/PLAN:                                                    ASSESSMENT / PLAN:  (R05) Cough  (primary encounter diagnosis)  Comment: chronic off and on, requesting more syrup   Plan: guaiFENesin (ROBITUSSIN) 100 MG/5ML liquid        Sx relief discussed, reordered cough medicine     (E11.22,  N18.3,  Z79.4) Type 2 diabetes mellitus with stage 3 chronic kidney disease, with long-term current use of insulin (H)  Comment: elevated sugars due to prednisone intake, has not gotten her new meter yet  Plan: Continuous Blood Gluc  (Expert DynamicsSTYLE LEBRON        14 DAY READER) RO        Reordered meter, diet and exercise discussed, patient is independent in her insulin testing and administration     (M25.511,  G89.29,  M25.512) Chronic pain of both shoulders  Comment: working with Sports medicine and Rheumatology, see notes included in this  note,   Plan: Therapy for now and then consider Steroid injections, continue leflunomide and follow up with Rheumatology and sports medicine       (F25.1) Schizoaffective disorder, depressive type (H)  Comment: Stable on meds   Plan: maintain meds, follow up with Psych on 5/6 which will be her last visit with Dr. Pedersen     (M06.4) Inflammatory polyarthropathy (H)  Comment: following with Rheumatology   Plan: continue current plan     Patient Instructions   We have reordered your Gabby Freestyle     We will have our [facility] follow up on the waiver needed so you can stay in your housing and adm your own insulin    We have reordered your cough medicine       GERD (Adult)    The esophagus is a tube that carries food from the mouth to the stomach. A valve (the LES, lower esophageal sphincter) at the lower end of the esophagus prevents stomach acid from flowing upward. When this valve doesn't work properly, stomach contents may repeatedly flow back up (reflux) into the esophagus. This is called gastroesophageal reflux disease (GERD). GERD can irritate the esophagus. It can cause problems with pain, swallowing or breathing. In severe cases, GERD can cause recurrent pneumonia (from aspiration or breathing in particles) or other serious problems.  Symptoms of reflux include burning, pressure or sharp pain in the upper abdomen or mid to lower chest. The pain can spread to the neck, back, or shoulder. There may be belching, an acid taste in the back of the throat, chronic cough, or sore throat, or hoarseness. GERD symptoms often occur during the day after a big meal. They can also occur at night when lying down.   Home care  Lifestyle changes can help reduce symptoms. If needed, your healthcare provider may prescribe medicines. Symptoms often improve with treatment, but if treatment is stopped, the symptoms often return after a few months. So most persons with GERD will need to continue treatment or get treatment on and  "off.  Lifestyle changes    Limit or avoid fatty, fried, and spicy foods, as well as coffee, chocolate, mint, and foods with high acid content such as tomatoes and citrus fruit and juices (orange, grapefruit, lemon).    Don t eat large meals, especially at night. Frequent, smaller meals are best. Don't lie down right after eating. And don t eat anything 3 hours before going to bed.    Don't drink alcohol or smoke. As much as possible, stay away from second hand smoke.    If you are overweight, losing weight will reduce symptoms.     Don't wear tight clothing around your stomach area.    If your symptoms occur during sleep, use a foam wedge to elevate your upper body (not just your head.) Or, place 4\" blocks under the head of your bed. Or use 2 bed risers under your bedframe.  Medicines  If needed, medicines can help relieve the symptoms of GERD and prevent damage to the esophagus. Discuss a medicine plan with your healthcare provider. This may include one or more of the following medicines:    Antacids to help neutralize the normal acids in your stomach.    Acid blockers (Histamine or H2 blockers) to decrease acid production.    Acid inhibitors (proton pump inhibitors PPIs) to decrease acid production in a different way than the blockers. They may work better, but can take a little longer to take effect.  Take an antacid 30 to 60 minutes after eating and at bedtime, but not at the same time as an acid blocker.Try not to take medicines such as ibuprofen and aspirin. If you are taking aspirin for your heart or other medical reasons, talk to your healthcare provider about stopping it.  Follow-up care  Follow up with your healthcare provider or as advised by our staff.  When to seek medical advice  Call your healthcare provider if any of the following occur:    Stomach pain gets worse or moves to the lower right abdomen (appendix area)    Chest pain appears or gets worse, or spreads to the back, neck, shoulder, or " arm    An over-the-counter trial of medicine doesn't relieve your symptoms    Weight loss that can't be explained    Trouble or pain swallowing    Frequent vomiting (can t keep down liquids)    Blood in the stool or vomit (red or black in color)    Feeling weak or dizzy    Fever of 100.4 F (38 C) or higher, or as directed by your healthcare provider  Date Last Reviewed: 3/1/2018    [id number] [employer]. [address] [place] [address]. All rights reserved. This information is not intended as a substitute for professional medical care. Always follow your healthcare professional's instructions.               Wendy Tang, DIONISIO, APRN [facility] PRIMARY [facility]

## 2019-04-30 NOTE — LETTER
[facility] PRIMARY CARE   [address] [address]  [facility] [address]    May 2, 2019     To whom it may concern,     Nena Tang administers her insulin and performs glucose checks indepedent of group home staff. I support her ongoing independence in managing her diabetes care.       Sincerely,       ART Lobo CNP

## 2019-05-03 NOTE — PROGRESS NOTES
[facility] Psychiatry Progress Note                                                                   Nena Tang is a 58 year old female who was referred by her primary care provider for treatment and evaluation of depression and psychosis  Therapist: N/A  PCP: Rowena Haas  Other Providers: N/A     History was provided by patient and care taker Zheng Barnes (tel # [phone]) who was a limited historian.    Pertinent Background:  See section specific documentation.  Psych critical item history includes suicidal ideation, psychosis [sxs include hallucinations], mutiple psychotropic trials, psych hosp (>5) and SUBSTANCE USE: cocaine and alcohol  Interim History                                                                                                        4, 4     She reported that over the last two weeks or so she has had more hallucinations of a man in her room. This is her typical hallucinatory experience. She has not been wearing her CPAP because she feels like the man has his hand on her face.     She reported no physical discomfort or breathing problems with wearing the CPAP and agreed to trying wearing it or consulting her sleep specialist.    She stated that she was feeling more sad than usual as mother's day was approaching as she was missing her  mother. She stated that she is trying to coordinate with her sons about scattering her mother's ashes and keeping some of it in a locket.    Discussed techniques to quieten hallucinations.      Discussed the importance of good sleep hygiene and using CPAP.    She opted to continue her current medication.    Recent Symptoms:   Depression:  She said that she felt depressed but reported no suicidal ideation, without intent, depressed mood, anhedonia, low energy and excessive crying  Psychosis:  delusions, auditory hallucinations and visual hallucinations    Recent Substance Use:  none reported        Social/ Family  History                                  [per patient report]                                 1ea,1ea   She is currently on SSDI and lives in an Adult Foster Care Facility. She is  for the last 3-4 years and was  about 12 years. She has two adult sons. She reported no history of abuse in her life    Medical / Surgical History                                                                                                                  Patient Active Problem List   Diagnosis     Osteopenia     Vitamin B12 deficiency without anemia     Hyperlipidemia LDL goal <100     Rotator cuff syndrome     Type 2 diabetes mellitus with mild nonproliferative retinopathy (H)     Illiterate     Irritable bowel syndrome     overweight - BMI >35     Takotsubo cardiomyopathy     CAD (coronary artery disease)     Restless legs syndrome (RLS)     CINDY (obstructive sleep apnea)- mild AHI 10.3     Verbal auditory hallucination     Chronic low back pain     Schizoaffective disorder, depressive type (H)     Migraine headache     HTN, goal below 140/90     Health Care Home     Lumbago     Cervicalgia     Cocaine abuse, episodic (H)     Suicidal ideation     Esophageal reflux     Mild nonproliferative diabetic retinopathy (H)     Tardive dyskinesia     Alcohol use     Left cataract     Falls frequently     History of uterine cancer     Psychophysiological insomnia     Dysuria     Asymptomatic postmenopausal status     Abdominal pain, right lower quadrant     Infectious encephalopathy     Non-intractable vomiting with nausea     Thoughts of self harm     Gastroenteritis     Posttraumatic stress disorder     Cervical cancer screening     Type 2 diabetes mellitus with stage 3 chronic kidney disease, with long-term current use of insulin (H)     Positive AIDA (antinuclear antibody)       Past Surgical History:   Procedure Laterality Date     C OOPHORECTORMY FOR MALIG, W/BX  1983    UTERINE     CATARACT IOL, RT/LT Bilateral 2017      COLONOSCOPY N/A 3/16/2017    Procedure: COLONOSCOPY;  Surgeon: Traci Gonzalez MD;  Location:  GI     Coronary CTA  5/21/2014     HYSTERECTOMY  1983    uterine cancer yearly pap's per provider.     LAPAROSCOPIC CHOLECYSTECTOMY  2008     PHACOEMULSIFICATION CLEAR CORNEA WITH STANDARD INTRAOCULAR LENS IMPLANT Left 5/5/2017    Procedure: PHACOEMULSIFICATION CLEAR CORNEA WITH STANDARD INTRAOCULAR LENS IMPLANT;  LEFT EYE PHACOEMULSIFICATION CLEAR CORNEA WITH STANDARD INTRAOCULAR LENS IMPLANT ;  Surgeon: Tyra Diaz MD;  Location:  EC     PHACOEMULSIFICATION CLEAR CORNEA WITH STANDARD INTRAOCULAR LENS IMPLANT Right 6/30/2017    Procedure: PHACOEMULSIFICATION CLEAR CORNEA WITH STANDARD INTRAOCULAR LENS IMPLANT;  RIGHT EYE PHACOEMULSIFICATION CLEAR CORNEA WITH STANDARD INTRAOCULAR LENS IMPLANT;  Surgeon: Tyra Diaz MD;  Location:  EC     RELEASE TRIGGER FINGER  10/11/2012    Left thumb. Procedure: RELEASE TRIGGER FINGER;  LEFT THUMB TRIGGER RELEASE;  Surgeon: Tay Langley MD;  Location:  SD     RELEASE TRIGGER FINGER Right 12/26/2016    Procedure: RELEASE TRIGGER FINGER;  Surgeon: Albino Castañeda MD;  Location:  OR        Medical Review of Systems                                                                                                    2,10   A comprehensive review of systems was performed and is negative other than noted in the HPI or interim history.    Allergy                                Imidazole antifungals; Ketoprofen; Lisinopril; Metformin; Metronidazole; and Posaconazole  Current Medications                                                                                                       Current Outpatient Medications   Medication Sig Dispense Refill     acetaminophen (TYLENOL) 500 MG tablet Take 2 tablets (1,000 mg) by mouth 3 times daily as needed for mild pain 100 tablet 3     albuterol (PROAIR HFA/PROVENTIL HFA/VENTOLIN HFA) 108 (90 Base) MCG/ACT  Inhaler Inhale 2 puffs into the lungs every 6 hours as needed for shortness of breath / dyspnea or wheezing 1 Inhaler 0     aspirin (ASA) 81 MG EC tablet TAKE 1 TABLET (81MG) BY MOUTH DAILY 30 tablet 3     atorvastatin (LIPITOR) 80 MG tablet TAKE 1 TABLET (80MG) BY MOUTH DAILY 30 tablet 11     benztropine (COGENTIN) 0.5 MG tablet Take 2 tablets (1 mg) by mouth 2 times daily 120 tablet 2     blood glucose (NO BRAND SPECIFIED) test strip Use to test blood sugar  4 times daily or as directed. To accompany: Blood Glucose Monitor Brands: per insurance. 100 strip 11     calcium carbonate (OS-ALLEN) 1500 (600 Ca) MG tablet Take 3 tablets (1,800 mg) by mouth daily 180 tablet 3     cholecalciferol (VITAMIN D3) 98131 units (1250 mcg) capsule TAKE 1 CAPSULE (50,000 UNITS) MONTHLY 4 capsule 3     Continuous Blood Gluc  (FREESTYLE LEBRON 14 DAY READER) RO 1 Device 4 times daily For continuous glucose monitoring. 1 Device 0     Continuous Blood Gluc Sensor (FREESTYLE LEBRON 14 DAY SENSOR) MISC 1 Device every 14 days 6 each 3     diclofenac (VOLTAREN) 1 % topical gel Place 4 g onto the skin 4 times daily as needed for moderate pain 1 Tube 1     dulaglutide (TRULICITY) 1.5 MG/0.5ML pen Inject 1.5 mg Subcutaneous every 7 days 2 mL 3     gabapentin (NEURONTIN) 300 MG capsule TAKE 2 CAPSULES (600MG) BY MOUTH IN THE MORNING AND AFTERNOON; 3 CAPSULES AT BEDTIME. 210 capsule 1     glucose 4 G CHEW chewable tablet Take 2 every 15 minutes for blood sugar <70mg/dL. Recheck blood sugar every 15 minutes until above 70mg/dL, then eat a substantial meal. 20 tablet 1     guaiFENesin (ROBITUSSIN) 100 MG/5ML liquid Take 10 mLs (200 mg) by mouth every 4 hours as needed for cough 560 mL 1     insulin aspart (NOVOLOG FLEXPEN) 100 UNIT/ML pen Inject 20 Units Subcutaneous 3 times daily (with meals) Once daily, can add additional 5 units if BGs are >500mg/dL. 15 mL 11     insulin glargine (LANTUS SOLOSTAR) 100 UNIT/ML pen Inject 48 Units  Subcutaneous At Bedtime 3 mL 11     insulin pen needle (NOVOFINE 30) 30G X 8 MM miscellaneous USE 4 DAILY OR AS DIRECTED 400 each 0     ipratropium - albuterol 0.5 mg/2.5 mg/3 mL (DUONEB) 0.5-2.5 (3) MG/3ML neb solution Take 1 vial (3 mLs) by nebulization every 6 hours as needed for shortness of breath / dyspnea or wheezing 30 vial 0     leflunomide (ARAVA) 20 MG tablet Take 1 tablet (20 mg) by mouth daily Labs every 8-12 weeks 60 tablet 0     losartan (COZAAR) 50 MG tablet Take 1 tablet (50 mg) by mouth daily 90 tablet 3     melatonin 5 MG CAPS Take 2 capsules by mouth At Bedtime 180 capsule 3     metoprolol succinate (TOPROL-XL) 25 MG 24 hr tablet Take 1 tablet (25 mg) by mouth daily 90 tablet 1     order for DME Equipment being ordered: Freestyle Gab[employer] [place] 1 Device 0     paliperidone ER (INVEGA) 9 MG 24 hr tablet Take 1 tablet (9 mg) by mouth every morning 30 tablet 2     polyethylene glycol (MIRALAX/GLYCOLAX) powder TAKE 8.5 GRAMS (1/2 CAPFUL) BY MOUTH DAILY 527 g 1     predniSONE (DELTASONE) 5 MG tablet April 9, 2019: 12.5 mg PO daily. 150 tablet 0     ranitidine (ZANTAC) 300 MG tablet TAKE 1 TABLET (300MG) BY MOUTH AT BEDTIME 90 tablet 1     senna-docusate (SENOKOT-S;PERICOLACE) 8.6-50 MG per tablet Take 1 tablet by mouth 2 times daily as needed for constipation 100 tablet 1     sertraline (ZOLOFT) 100 MG tablet Take 2 tablets (200 mg) by mouth daily 60 tablet 2     spacer (OPTICHAMBER PATRICIA) holding chamber Holding/spacer device to use with inhaler. 1 each 0     SUMAtriptan (IMITREX) 25 MG tablet Take 1-2 tablets (25-50 mg) by mouth at onset of headache for migraine May repeat in 2 hours. Max 8 tablets/24 hours. 12 tablet 1     topiramate (TOPAMAX) 50 MG tablet Take 1.5 tablets (75 mg) by mouth 2 times daily 90 tablet 3     Vitals                                                                                                                       3, 3   LMP 01/06/2015    Mental Status Exam                                                                                     9, 14 cog gs     Appearance: casually groomed  Behavior/Demeanor: cooperative, with good  eye contact   Speech: normal  Language: intact  Psychomotor: abnormal movements of the tongue and lower jaw  Mood: depressed  Affect: full range; was congruent to mood; was congruent to content  Thought Process/Associations: unremarkable  Thought Content:  Reports none;  Denies suicidal ideation and violent ideation  Perception:  Reports none;  Denies auditory hallucinations and visual hallucinations  Insight: limited  Judgment: limited  Cognition: (6) does  appear grossly intact; formal cognitive testing was not done  grossly oriented to her circumstances  Gait and Station: unremarkable    Labs and Data                                                                                                                 PHQ9  PHQ-9 SCORE 2/3/2017 3/13/2018 10/26/2018   PHQ-9 Total Score - - -   PHQ-9 Total Score - - -   PHQ-9 Total Score 0 14 20         Diagnosis and Assessment                                                                             m2, h3     Today the following issues were addressed:    1) Psychosis  2) Grief    [place] Prescription Monitoring Program [] review was not needed today.    PSYCHOTROPIC DRUG INTERACTIONS: none clinically relevant     Diagnosis:  Schizoaffective Disorder    Plan                                                                                                                    m2, h3      Continue current medications:  Current Outpatient Medications   Medication Sig     acetaminophen (TYLENOL) 500 MG tablet Take 2 tablets (1,000 mg) by mouth 3 times daily as needed for mild pain     albuterol (PROAIR HFA/PROVENTIL HFA/VENTOLIN HFA) 108 (90 Base) MCG/ACT Inhaler Inhale 2 puffs into the lungs every 6 hours as needed for shortness of breath / dyspnea or wheezing     aspirin (ASA) 81 MG EC tablet TAKE 1 TABLET (81MG) BY MOUTH  DAILY     atorvastatin (LIPITOR) 80 MG tablet TAKE 1 TABLET (80MG) BY MOUTH DAILY     benztropine (COGENTIN) 0.5 MG tablet Take 2 tablets (1 mg) by mouth 2 times daily     blood glucose (NO BRAND SPECIFIED) test strip Use to test blood sugar  4 times daily or as directed. To accompany: Blood Glucose Monitor Brands: per insurance.     calcium carbonate (OS-ALLEN) 1500 (600 Ca) MG tablet Take 3 tablets (1,800 mg) by mouth daily     cholecalciferol (VITAMIN D3) 32203 units (1250 mcg) capsule TAKE 1 CAPSULE (50,000 UNITS) MONTHLY     Continuous Blood Gluc  (FREESTYLE LEBRON 14 DAY READER) RO 1 Device 4 times daily For continuous glucose monitoring.     Continuous Blood Gluc Sensor (FREESTYLE LEBRON 14 DAY SENSOR) MISC 1 Device every 14 days     diclofenac (VOLTAREN) 1 % topical gel Place 4 g onto the skin 4 times daily as needed for moderate pain     dulaglutide (TRULICITY) 1.5 MG/0.5ML pen Inject 1.5 mg Subcutaneous every 7 days     gabapentin (NEURONTIN) 300 MG capsule TAKE 2 CAPSULES (600MG) BY MOUTH IN THE MORNING AND AFTERNOON; 3 CAPSULES AT BEDTIME.     glucose 4 G CHEW chewable tablet Take 2 every 15 minutes for blood sugar <70mg/dL. Recheck blood sugar every 15 minutes until above 70mg/dL, then eat a substantial meal.     guaiFENesin (ROBITUSSIN) 100 MG/5ML liquid Take 10 mLs (200 mg) by mouth every 4 hours as needed for cough     insulin aspart (NOVOLOG FLEXPEN) 100 UNIT/ML pen Inject 20 Units Subcutaneous 3 times daily (with meals) Once daily, can add additional 5 units if BGs are >500mg/dL.     insulin glargine (LANTUS SOLOSTAR) 100 UNIT/ML pen Inject 48 Units Subcutaneous At Bedtime     insulin pen needle (NOVOFINE 30) 30G X 8 MM miscellaneous USE 4 DAILY OR AS DIRECTED     ipratropium - albuterol 0.5 mg/2.5 mg/3 mL (DUONEB) 0.5-2.5 (3) MG/3ML neb solution Take 1 vial (3 mLs) by nebulization every 6 hours as needed for shortness of breath / dyspnea or wheezing     leflunomide (ARAVA) 20 MG tablet Take  1 tablet (20 mg) by mouth daily Labs every 8-12 weeks     losartan (COZAAR) 50 MG tablet Take 1 tablet (50 mg) by mouth daily     melatonin 5 MG CAPS Take 2 capsules by mouth At Bedtime     metoprolol succinate (TOPROL-XL) 25 MG 24 hr tablet Take 1 tablet (25 mg) by mouth daily     order for DME Equipment being ordered: Freestyle Gab[employer] [place]     paliperidone ER (INVEGA) 9 MG 24 hr tablet Take 1 tablet (9 mg) by mouth every morning     polyethylene glycol (MIRALAX/GLYCOLAX) powder TAKE 8.5 GRAMS (1/2 CAPFUL) BY MOUTH DAILY     predniSONE (DELTASONE) 5 MG tablet April 9, 2019: 12.5 mg PO daily.     ranitidine (ZANTAC) 300 MG tablet TAKE 1 TABLET (300MG) BY MOUTH AT BEDTIME     senna-docusate (SENOKOT-S;PERICOLACE) 8.6-50 MG per tablet Take 1 tablet by mouth 2 times daily as needed for constipation     sertraline (ZOLOFT) 100 MG tablet Take 2 tablets (200 mg) by mouth daily     spacer (OPTICHAMBER PATRICIA) holding chamber Holding/spacer device to use with inhaler.     SUMAtriptan (IMITREX) 25 MG tablet Take 1-2 tablets (25-50 mg) by mouth at onset of headache for migraine May repeat in 2 hours. Max 8 tablets/24 hours.     topiramate (TOPAMAX) 50 MG tablet Take 1.5 tablets (75 mg) by mouth 2 times daily     No current facility-administered medications for this visit.      RTC: with primary care provider as scheduled and with Dr. Brothers since I am leaving [facility]    CRISIS NUMBERS:   Provided routinely in AVS.    Treatment Risk Statement:  The patient understands the risks, benefits, adverse effects and alternatives. Agrees to treatment with the capacity to do so. No medical contraindications to treatment. Agrees to call clinic for any problems. The patient understands to call 911 or go to the nearest ED if life threatening or urgent symptoms occur.      [facility] Individual Psychotherapy Note                                                                     [16]     Start time - 11:00am       End time - 11:23am    Subjective:  This supportive psychotherapy session addressed issues related to hallucinations and grief.  We spent some time talking about her work with me as this was her last visit with me. Patient's reaction: Contemplation in the context of mental status appropriate for ambulatory setting.  Progress: good  Plan: RTC with primary care provider as scheduled and with Dr. rBothers since I am leaving [facility]  Psychotherapy services during this visit included myself and the patient.   PROVIDER:  Kraig Pedersen MD

## 2019-05-06 ENCOUNTER — OFFICE VISIT (OUTPATIENT)
Dept: PSYCHIATRY | Facility: CLINIC | Age: 59
End: 2019-05-06
Payer: MEDICARE

## 2019-05-06 DIAGNOSIS — F25.0 SCHIZOAFFECTIVE DISORDER, BIPOLAR TYPE (H): Primary | ICD-10-CM

## 2019-05-06 PROCEDURE — 90833 PSYTX W PT W E/M 30 MIN: CPT | Performed by: PSYCHIATRY & NEUROLOGY

## 2019-05-06 PROCEDURE — 99213 OFFICE O/P EST LOW 20 MIN: CPT | Performed by: PSYCHIATRY & NEUROLOGY

## 2019-05-06 NOTE — PATIENT INSTRUCTIONS
Continue current medications as prescribed.  Use techniques discussed today to quieten hallucinations.  Try to use your CPAP regularly. Call sleep specialist if having trouble with CPAP.  Current Outpatient Medications   Medication Sig     acetaminophen (TYLENOL) 500 MG tablet Take 2 tablets (1,000 mg) by mouth 3 times daily as needed for mild pain     albuterol (PROAIR HFA/PROVENTIL HFA/VENTOLIN HFA) 108 (90 Base) MCG/ACT Inhaler Inhale 2 puffs into the lungs every 6 hours as needed for shortness of breath / dyspnea or wheezing     aspirin (ASA) 81 MG EC tablet TAKE 1 TABLET (81MG) BY MOUTH DAILY     atorvastatin (LIPITOR) 80 MG tablet TAKE 1 TABLET (80MG) BY MOUTH DAILY     benztropine (COGENTIN) 0.5 MG tablet Take 2 tablets (1 mg) by mouth 2 times daily     blood glucose (NO BRAND SPECIFIED) test strip Use to test blood sugar  4 times daily or as directed. To accompany: Blood Glucose Monitor Brands: per insurance.     calcium carbonate (OS-ALLEN) 1500 (600 Ca) MG tablet Take 3 tablets (1,800 mg) by mouth daily     cholecalciferol (VITAMIN D3) 75881 units (1250 mcg) capsule TAKE 1 CAPSULE (50,000 UNITS) MONTHLY     Continuous Blood Gluc  (FREESTYLE LEBRON 14 DAY READER) RO 1 Device 4 times daily For continuous glucose monitoring.     Continuous Blood Gluc Sensor (FREESTYLE LEBRON 14 DAY SENSOR) MISC 1 Device every 14 days     diclofenac (VOLTAREN) 1 % topical gel Place 4 g onto the skin 4 times daily as needed for moderate pain     dulaglutide (TRULICITY) 1.5 MG/0.5ML pen Inject 1.5 mg Subcutaneous every 7 days     gabapentin (NEURONTIN) 300 MG capsule TAKE 2 CAPSULES (600MG) BY MOUTH IN THE MORNING AND AFTERNOON; 3 CAPSULES AT BEDTIME.     glucose 4 G CHEW chewable tablet Take 2 every 15 minutes for blood sugar <70mg/dL. Recheck blood sugar every 15 minutes until above 70mg/dL, then eat a substantial meal.     guaiFENesin (ROBITUSSIN) 100 MG/5ML liquid Take 10 mLs (200 mg) by mouth every 4 hours as needed  for cough     insulin aspart (NOVOLOG FLEXPEN) 100 UNIT/ML pen Inject 20 Units Subcutaneous 3 times daily (with meals) Once daily, can add additional 5 units if BGs are >500mg/dL.     insulin glargine (LANTUS SOLOSTAR) 100 UNIT/ML pen Inject 48 Units Subcutaneous At Bedtime     insulin pen needle (NOVOFINE 30) 30G X 8 MM miscellaneous USE 4 DAILY OR AS DIRECTED     ipratropium - albuterol 0.5 mg/2.5 mg/3 mL (DUONEB) 0.5-2.5 (3) MG/3ML neb solution Take 1 vial (3 mLs) by nebulization every 6 hours as needed for shortness of breath / dyspnea or wheezing     leflunomide (ARAVA) 20 MG tablet Take 1 tablet (20 mg) by mouth daily Labs every 8-12 weeks     losartan (COZAAR) 50 MG tablet Take 1 tablet (50 mg) by mouth daily     melatonin 5 MG CAPS Take 2 capsules by mouth At Bedtime     metoprolol succinate (TOPROL-XL) 25 MG 24 hr tablet Take 1 tablet (25 mg) by mouth daily     order for DME Equipment being ordered: Freestyle Gab[employer] [place]     paliperidone ER (INVEGA) 9 MG 24 hr tablet Take 1 tablet (9 mg) by mouth every morning     polyethylene glycol (MIRALAX/GLYCOLAX) powder TAKE 8.5 GRAMS (1/2 CAPFUL) BY MOUTH DAILY     predniSONE (DELTASONE) 5 MG tablet April 9, 2019: 12.5 mg PO daily.     ranitidine (ZANTAC) 300 MG tablet TAKE 1 TABLET (300MG) BY MOUTH AT BEDTIME     senna-docusate (SENOKOT-S;PERICOLACE) 8.6-50 MG per tablet Take 1 tablet by mouth 2 times daily as needed for constipation     sertraline (ZOLOFT) 100 MG tablet Take 2 tablets (200 mg) by mouth daily     spacer (OPTICHAMBER PATRICIA) holding chamber Holding/spacer device to use with inhaler.     SUMAtriptan (IMITREX) 25 MG tablet Take 1-2 tablets (25-50 mg) by mouth at onset of headache for migraine May repeat in 2 hours. Max 8 tablets/24 hours.     topiramate (TOPAMAX) 50 MG tablet Take 1.5 tablets (75 mg) by mouth 2 times daily     No current facility-administered medications for this visit.      24/7 Crisis Hotlines:    National Suicide Prevention  Hotline   303-402-BMTB (1106)    Crisis Hotlines by County:    [place] Crisis Line     [phone]  [place] Crisis Line   [phone]  [place] Crisis Line     [phone]  [place] COPE for Adults   [phone]  [place] for Children    [phone]  [facility] for Adults    [phone]  [place] for Children    [phone]  [facility] Crisis Line    [phone]    [facility] Crisis Response Team  [phone] ([phone])  [facility] Crisis is available 24 hours a day. The team provides callers with a needs assessment and referrals to appropriate resources. If medically necessary, the Crisis Response Team assists individuals by traveling to their location to provide face-to-face interventions and assessments. Crisis services are available for adults and children in [facility] and [facility]. Adult Crisis beds are also available if appropriate.    Walk-In Centers and Mobile Teams  [facility] Walk-In Crisis Intervention  [phone]  [facility] COPE (18+) Crisis Mobile Team    [phone]  [facility] Child (under 17) Crisis Mobile Team [phone]  [facility] for Adults Walk-In & Mobile Teams [phone]    Additional Crisis Hotlines:  [facility]      [phone]  Crisis Connection      [phone]  [facility] ([facility] Crisis Services)  [phone]  [facility] Social Service (S)    [phone]  Men s Crisis Line      388-622-UYRW ([phone])  National Suicide Prevention Hotline   270-775-MWSO (2834)  [facility] Crisis Line    [phone]  Suicide Hotline      725.492.1688  [facility] ([facility])  Dial 2-1-2 ([phone])    Domestic Violence, Rape and Sexual Abuse Hotlines:  [place] Crisis Line     [phone]  [place] Helpline  [phone]  Domestic Abuse Project (DAP)    1-658-516-7726*  [facility] Line      [phone]  [place] [facility]  8-854-187-5531*  [place] [facility]       [phone] ([phone]*)  National Domestic Violence Hotline   5-487-642-[facility]  Rape/Sexual Abuse Center Crisis Line    [phone]   Sexual Violence Center ([place])    [phone]  [employer]     [phone] ([phone]*)

## 2019-05-07 ENCOUNTER — OFFICE VISIT (OUTPATIENT)
Dept: FAMILY MEDICINE | Facility: CLINIC | Age: 59
End: 2019-05-07
Payer: MEDICARE

## 2019-05-07 ENCOUNTER — MEDICAL CORRESPONDENCE (OUTPATIENT)
Dept: HEALTH INFORMATION MANAGEMENT | Facility: CLINIC | Age: 59
End: 2019-05-07

## 2019-05-07 ENCOUNTER — OFFICE VISIT (OUTPATIENT)
Dept: PHARMACY | Facility: CLINIC | Age: 59
End: 2019-05-07
Payer: COMMERCIAL

## 2019-05-07 ENCOUNTER — OFFICE VISIT (OUTPATIENT)
Dept: BEHAVIORAL HEALTH | Facility: CLINIC | Age: 59
End: 2019-05-07
Payer: MEDICARE

## 2019-05-07 VITALS
HEART RATE: 93 BPM | BODY MASS INDEX: 46.87 KG/M2 | TEMPERATURE: 97.4 F | DIASTOLIC BLOOD PRESSURE: 64 MMHG | RESPIRATION RATE: 18 BRPM | WEIGHT: 240 LBS | SYSTOLIC BLOOD PRESSURE: 110 MMHG | OXYGEN SATURATION: 94 %

## 2019-05-07 DIAGNOSIS — F25.1 SCHIZOAFFECTIVE DISORDER, DEPRESSIVE TYPE (H): ICD-10-CM

## 2019-05-07 DIAGNOSIS — G89.29 CHRONIC PAIN OF BOTH SHOULDERS: ICD-10-CM

## 2019-05-07 DIAGNOSIS — N39.46 MIXED INCONTINENCE: ICD-10-CM

## 2019-05-07 DIAGNOSIS — M25.511 CHRONIC PAIN OF BOTH SHOULDERS: ICD-10-CM

## 2019-05-07 DIAGNOSIS — E11.65 TYPE 2 DIABETES MELLITUS WITH HYPERGLYCEMIA, WITH LONG-TERM CURRENT USE OF INSULIN (H): Primary | ICD-10-CM

## 2019-05-07 DIAGNOSIS — E11.3299 TYPE 2 DIABETES MELLITUS WITH MILD NONPROLIFERATIVE RETINOPATHY WITHOUT MACULAR EDEMA, WITH LONG-TERM CURRENT USE OF INSULIN, UNSPECIFIED LATERALITY (H): ICD-10-CM

## 2019-05-07 DIAGNOSIS — E66.9 OBESITY, UNSPECIFIED OBESITY SEVERITY, UNSPECIFIED OBESITY TYPE: ICD-10-CM

## 2019-05-07 DIAGNOSIS — K59.00 CONSTIPATION, UNSPECIFIED CONSTIPATION TYPE: ICD-10-CM

## 2019-05-07 DIAGNOSIS — Z79.4 TYPE 2 DIABETES MELLITUS WITH MILD NONPROLIFERATIVE RETINOPATHY WITHOUT MACULAR EDEMA, WITH LONG-TERM CURRENT USE OF INSULIN, UNSPECIFIED LATERALITY (H): Primary | ICD-10-CM

## 2019-05-07 DIAGNOSIS — M25.512 CHRONIC PAIN OF BOTH SHOULDERS: ICD-10-CM

## 2019-05-07 DIAGNOSIS — Z79.4 TYPE 2 DIABETES MELLITUS WITH HYPERGLYCEMIA, WITH LONG-TERM CURRENT USE OF INSULIN (H): Primary | ICD-10-CM

## 2019-05-07 DIAGNOSIS — E11.3299 TYPE 2 DIABETES MELLITUS WITH MILD NONPROLIFERATIVE RETINOPATHY WITHOUT MACULAR EDEMA, WITH LONG-TERM CURRENT USE OF INSULIN, UNSPECIFIED LATERALITY (H): Primary | ICD-10-CM

## 2019-05-07 DIAGNOSIS — F25.1 SCHIZOAFFECTIVE DISORDER, DEPRESSIVE TYPE (H): Primary | ICD-10-CM

## 2019-05-07 DIAGNOSIS — Z79.4 TYPE 2 DIABETES MELLITUS WITH MILD NONPROLIFERATIVE RETINOPATHY WITHOUT MACULAR EDEMA, WITH LONG-TERM CURRENT USE OF INSULIN, UNSPECIFIED LATERALITY (H): ICD-10-CM

## 2019-05-07 PROCEDURE — 99607 MTMS BY PHARM ADDL 15 MIN: CPT | Performed by: PHARMACIST

## 2019-05-07 PROCEDURE — 99606 MTMS BY PHARM EST 15 MIN: CPT | Performed by: PHARMACIST

## 2019-05-07 PROCEDURE — 90832 PSYTX W PT 30 MINUTES: CPT | Performed by: SOCIAL WORKER

## 2019-05-07 PROCEDURE — 99215 OFFICE O/P EST HI 40 MIN: CPT | Performed by: NURSE PRACTITIONER

## 2019-05-07 RX ORDER — PREDNISONE 5 MG/1
TABLET ORAL
Qty: 150 TABLET | Refills: 0 | Status: ON HOLD | COMMUNITY
Start: 2019-05-07 | End: 2019-05-15

## 2019-05-07 RX ORDER — POLYETHYLENE GLYCOL 3350 17 G/17G
POWDER, FOR SOLUTION ORAL
Qty: 527 G | Refills: 1 | Status: SHIPPED | OUTPATIENT
Start: 2019-05-07 | End: 2019-07-28

## 2019-05-07 ASSESSMENT — PATIENT HEALTH QUESTIONNAIRE - PHQ9: SUM OF ALL RESPONSES TO PHQ QUESTIONS 1-9: 22

## 2019-05-07 NOTE — PROGRESS NOTES
SUBJECTIVE:                                                    Nena Tang is a 59 year old female who presents to clinic today for the following health issues:  [facility]: Charlie  MTM: Eugenia    Recheck Medication: would like to discontinue Prednisone says the new medication is working well, and she does not like the side effect of weight gain. Patient reports that her pain has decreased significantly since she was started on the new medication.     CPAP use: Patient would like to work on using her CPAP more frequently because she is getting more agitated and restless throughout the day. Patient reports that she feels like someone is covering her mouth and a hand is covering her face and she gets scared and rips the CPAP off her face. Discussed that her change in behavior might also be related to the steroid use.     Diabetes Follow-up  Patient is requesting to have medication switched to help with weight issues and blood sugar management. Plan to switch patient from trulicity to ozempic- MTM working with patient on this.   Patient is checking blood sugars: four times daily.    Blood sugar testing frequency justification: Uncontrolled diabetes while on the prednisone.   Results are as follows:         [id number]     Diabetic concerns: blood sugar frequently over 200     Symptoms of hypoglycemia (low blood sugar): none     Paresthesias (numbness or burning in feet) or sores: Yes toes     Date of last diabetic eye exam: 2 months ago    BP Readings from Last 2 Encounters:   04/30/19 102/60   04/15/19 127/69     Hemoglobin A1C (%)   Date Value   12/20/2018 6.4 (H)   08/06/2018 6.4 (H)     LDL Cholesterol Calculated (mg/dL)   Date Value   08/06/2018 84   06/27/2017 64       Diabetes Management Resources    Mixed  Incontinence: Patient is having both urine and stool incontinence. Patient reports that her ability to hold the urine is not as good as what it was in the past. Patient reports rarely having stool incontinence, but is  aware that it is related to her laxative use. Patient does not want to discontinue her miralax because of possibly having constipation. Patient ok with reducing the frequent use of the medication.     Hypertension Follow-up  Patient denies any chest pain, heart palpitations, or any recent syncopal incidents.     Outpatient blood pressures are not being checked.    Low Salt Diet: no added salt      Mental Health Follow up: Schizoaffective  Reports that with mother's day approaching, she feels more sad because she misses her mother. She states that the same thing usually happens during father's day, but not as much. Patient reports that she still has ashes from her mom and her  in storage. Plans to disperse the ashes in a river. Patient also notes that her mood is down with the declining of her sister's health.     Status since last visit: fair    See PHQ-9 for current symptoms.  Other associated symptoms: None    Complicating factors: none  Significant life event:  No   Current substance abuse:  None  Anxiety or Panic symptoms:  No    Sleep - struggling with the use of CPAP and aware that this might help with improving her sleep.  Appetite - increased with the prednisone.   [place] [facility] [place]     Smoking - denies.   Alcohol - denies.   Street drugs - denies.   Marijuana - denies.   Caffeine - denies.     PHQ-9  English PHQ-9   Any Language               Problems taking medications regularly: NoNo    Medication side effects: none    Diet: regular (no restrictions)    Social History     Tobacco Use     Smoking status: Never Smoker     Smokeless tobacco: Never Used   Substance Use Topics     Alcohol use: No     Frequency: Never     Comment: last month        Problem list and histories reviewed & adjusted, as indicated.  Additional history: as documented    Patient Active Problem List   Diagnosis     Osteopenia     Vitamin B12 deficiency without anemia     Hyperlipidemia LDL goal <100      Rotator cuff syndrome     Type 2 diabetes mellitus with mild nonproliferative retinopathy (H)     Illiterate     Irritable bowel syndrome     overweight - BMI >35     Takotsubo cardiomyopathy     CAD (coronary artery disease)     Restless legs syndrome (RLS)     CINDY (obstructive sleep apnea)- mild AHI 10.3     Verbal auditory hallucination     Chronic low back pain     Schizoaffective disorder, depressive type (H)     Migraine headache     HTN, goal below 140/90     Health Care Home     Lumbago     Cervicalgia     Cocaine abuse, episodic (H)     Suicidal ideation     Esophageal reflux     Mild nonproliferative diabetic retinopathy (H)     Tardive dyskinesia     Alcohol use     Left cataract     Falls frequently     History of uterine cancer     Psychophysiological insomnia     Dysuria     Asymptomatic postmenopausal status     Abdominal pain, right lower quadrant     Infectious encephalopathy     Non-intractable vomiting with nausea     Thoughts of self harm     Gastroenteritis     Posttraumatic stress disorder     Cervical cancer screening     Type 2 diabetes mellitus with stage 3 chronic kidney disease, with long-term current use of insulin (H)     Positive AIDA (antinuclear antibody)     Past Surgical History:   Procedure Laterality Date     C OOPHORECTORMY FOR RAHEL, W/BX  1983    UTERINE     CATARACT IOL, RT/LT Bilateral 2017     COLONOSCOPY N/A 3/16/2017    Procedure: COLONOSCOPY;  Surgeon: Traci Gonzalez MD;  Location:  GI     Coronary CTA  5/21/2014     HYSTERECTOMY  1983    uterine cancer yearly pap's per provider.     LAPAROSCOPIC CHOLECYSTECTOMY  2008     PHACOEMULSIFICATION CLEAR CORNEA WITH STANDARD INTRAOCULAR LENS IMPLANT Left 5/5/2017    Procedure: PHACOEMULSIFICATION CLEAR CORNEA WITH STANDARD INTRAOCULAR LENS IMPLANT;  LEFT EYE PHACOEMULSIFICATION CLEAR CORNEA WITH STANDARD INTRAOCULAR LENS IMPLANT ;  Surgeon: Tyra Diaz MD;  Location: [facility]     PHACOEMULSIFICATION CLEAR  CORNEA WITH STANDARD INTRAOCULAR LENS IMPLANT Right 2017    Procedure: PHACOEMULSIFICATION CLEAR CORNEA WITH STANDARD INTRAOCULAR LENS IMPLANT;  RIGHT EYE PHACOEMULSIFICATION CLEAR CORNEA WITH STANDARD INTRAOCULAR LENS IMPLANT;  Surgeon: Tyra Diaz MD;  Location:  [facility]     RELEASE TRIGGER FINGER  10/11/2012    Left thumb. Procedure: RELEASE TRIGGER FINGER;  LEFT THUMB TRIGGER RELEASE;  Surgeon: Tay Langley MD;  Location:  [facility]     RELEASE TRIGGER FINGER Right 2016    Procedure: RELEASE TRIGGER FINGER;  Surgeon: Albino Castañeda MD;  Location:  OR       Social History     Tobacco Use     Smoking status: Never Smoker     Smokeless tobacco: Never Used   Substance Use Topics     Alcohol use: No     Frequency: Never     Comment: last month     Family History   Problem Relation Age of Onset     Cancer Mother         BLADDER     Respiratory Mother         COPD     Gastrointestinal Disease Mother         CIRRHOSIS OF LI BOLIVAR     Alcohol/Drug Mother      Diabetes Mother      Hypertension Mother      Lipids Mother      C.A.D. Mother      Glaucoma Mother      Alcohol/Drug Sister      Mental Illness Sister      Alcohol/Drug Sister      Psychotic Disorder Sister      Cancer Maternal Grandmother         UNKNOWN TYPE     Cancer Brother         COLON     Cancer - colorectal Brother         IN HIS LATE 30S     Alcohol/Drug Brother          OF HEROIN OVERDOSE AT AGE 22 YRS     Macular Degeneration No family hx of            Current Outpatient Medications   Medication Sig Dispense Refill     acetaminophen (TYLENOL) 500 MG tablet Take 2 tablets (1,000 mg) by mouth 3 times daily as needed for mild pain 100 tablet 3     albuterol (PROAIR HFA/PROVENTIL HFA/VENTOLIN HFA) 108 (90 Base) MCG/ACT Inhaler Inhale 2 puffs into the lungs every 6 hours as needed for shortness of breath / dyspnea or wheezing 1 Inhaler 0     aspirin (ASA) 81 MG EC tablet TAKE 1 TABLET (81MG) BY MOUTH DAILY 30 tablet 3     atorvastatin  (LIPITOR) 80 MG tablet TAKE 1 TABLET (80MG) BY MOUTH DAILY 30 tablet 11     benztropine (COGENTIN) 0.5 MG tablet Take 2 tablets (1 mg) by mouth 2 times daily 120 tablet 2     blood glucose (NO BRAND SPECIFIED) test strip Use to test blood sugar  4 times daily or as directed. To accompany: Blood Glucose Monitor Brands: per insurance. 100 strip 11     calcium carbonate (OS-ALLEN) 1500 (600 Ca) MG tablet Take 3 tablets (1,800 mg) by mouth daily 180 tablet 3     cholecalciferol (VITAMIN D3) 72954 units (1250 mcg) capsule TAKE 1 CAPSULE (50,000 UNITS) MONTHLY 4 capsule 3     Continuous Blood Gluc  (FREESTYLE LEBRON 14 DAY READER) RO 1 Device 4 times daily For continuous glucose monitoring. 1 Device 0     Continuous Blood Gluc Sensor (FREESTYLE LEBRON 14 DAY SENSOR) MISC 1 Device every 14 days 6 each 3     diclofenac (VOLTAREN) 1 % topical gel Place 4 g onto the skin 4 times daily as needed for moderate pain 1 Tube 1     dulaglutide (TRULICITY) 1.5 MG/0.5ML pen Inject 1.5 mg Subcutaneous every 7 days 2 mL 3     gabapentin (NEURONTIN) 300 MG capsule TAKE 2 CAPSULES (600MG) BY MOUTH IN THE MORNING AND AFTERNOON; 3 CAPSULES AT BEDTIME. 210 capsule 1     glucose 4 G CHEW chewable tablet Take 2 every 15 minutes for blood sugar <70mg/dL. Recheck blood sugar every 15 minutes until above 70mg/dL, then eat a substantial meal. 20 tablet 1     guaiFENesin (ROBITUSSIN) 100 MG/5ML liquid Take 10 mLs (200 mg) by mouth every 4 hours as needed for cough 560 mL 1     insulin aspart (NOVOLOG FLEXPEN) 100 UNIT/ML pen Inject 20 Units Subcutaneous 3 times daily (with meals) Once daily, can add additional 5 units if BGs are >500mg/dL. 15 mL 11     insulin glargine (LANTUS SOLOSTAR) 100 UNIT/ML pen Inject 48 Units Subcutaneous At Bedtime 3 mL 11     insulin pen needle (NOVOFINE 30) 30G X 8 MM miscellaneous USE 4 DAILY OR AS DIRECTED 400 each 0     ipratropium - albuterol 0.5 mg/2.5 mg/3 mL (DUONEB) 0.5-2.5 (3) MG/3ML neb solution Take 1  vial (3 mLs) by nebulization every 6 hours as needed for shortness of breath / dyspnea or wheezing 30 vial 0     leflunomide (ARAVA) 20 MG tablet Take 1 tablet (20 mg) by mouth daily Labs every 8-12 weeks 60 tablet 0     losartan (COZAAR) 50 MG tablet Take 1 tablet (50 mg) by mouth daily 90 tablet 3     melatonin 5 MG CAPS Take 2 capsules by mouth At Bedtime 180 capsule 3     metoprolol succinate (TOPROL-XL) 25 MG 24 hr tablet Take 1 tablet (25 mg) by mouth daily 90 tablet 1     order for DME Equipment being ordered: Freestyle Gabby [place] 1 Device 0     paliperidone ER (INVEGA) 9 MG 24 hr tablet Take 1 tablet (9 mg) by mouth every morning 30 tablet 2     polyethylene glycol (MIRALAX/GLYCOLAX) powder TAKE 8.5 GRAMS (1/2 CAPFUL) BY MOUTH DAILY 527 g 1     predniSONE (DELTASONE) 5 MG tablet April 9, 2019: 12.5 mg PO daily. 150 tablet 0     ranitidine (ZANTAC) 300 MG tablet TAKE 1 TABLET (300MG) BY MOUTH AT BEDTIME 90 tablet 1     senna-docusate (SENOKOT-S;PERICOLACE) 8.6-50 MG per tablet Take 1 tablet by mouth 2 times daily as needed for constipation 100 tablet 1     sertraline (ZOLOFT) 100 MG tablet Take 2 tablets (200 mg) by mouth daily 60 tablet 2     spacer (OPTICHAMBER PATRICIA) holding chamber Holding/spacer device to use with inhaler. 1 each 0     SUMAtriptan (IMITREX) 25 MG tablet Take 1-2 tablets (25-50 mg) by mouth at onset of headache for migraine May repeat in 2 hours. Max 8 tablets/24 hours. 12 tablet 1     topiramate (TOPAMAX) 50 MG tablet Take 1.5 tablets (75 mg) by mouth 2 times daily 90 tablet 3     Allergies   Allergen Reactions     Imidazole Antifungals Hives     Tolerates diflucan     Ketoprofen Itching     Pruritis to topical     Lisinopril Hives     Metformin Other (See Comments)     Patient hospitalized for lactic acidosis - admitting provider suspectd caused by metformin     Metronidazole Hives     Posaconazole Hives     Tolerates diflucan     Recent Labs   Lab Test 03/30/19  1659 02/27/19  1218  01/30/19  1215 12/20/18  1532  10/17/18  1531 08/06/18  1038  05/28/18  1625 05/22/18  1434  11/07/17  0801  06/27/17  1358  07/13/16  1350  07/14/15  1215   A1C  --   --   --  6.4*  --   --  6.4*  --   --  6.2*   < > 8.9*   < > 7.0*   < >  --    < > 9.1*   LDL  --   --   --   --   --   --  84  --   --   --   --   --   --  64  --  137*  --  78   HDL  --   --   --   --   --   --  46*  --   --   --   --   --   --  37*  --   --   --  39*   TRIG  --   --   --   --   --   --  215*  --   --   --   --   --   --  267*  --   --   --  151*   ALT  --   --  20  --   --  23  --   --  27  --    < > 23   < > 32   < >  --    < >  --    CR 1.28* 0.93 1.31* 0.99   < > 1.04  --    < > 1.16*  --    < > 0.88   < > 0.78   < >  --    < > 0.67   GFRESTIMATED 46* 68 45* 63   < > 54*  --    < > 48*  --    < > 66   < > 76   < >  --    < > >90  Non  GFR Calc     GFRESTBLACK 53* 79 52* 73   < > 66  --    < > 58*  --    < > 80   < > >90   GFR Calc     < >  --    < > >90   GFR Calc     POTASSIUM 4.8 4.4 4.7 4.2   < > 4.3  --    < > 4.5  --    < > 3.9   < > 4.3   < >  --    < > 4.3   TSH  --   --   --  1.98  --   --   --   --   --   --   --  3.21  --   --    < >  --    < >  --     < > = values in this interval not displayed.      BP Readings from Last 3 Encounters:   04/30/19 102/60   04/15/19 127/69   04/12/19 122/70    Wt Readings from Last 3 Encounters:   04/30/19 109.1 kg (240 lb 8 oz)   04/15/19 108.5 kg (239 lb 4.8 oz)   04/12/19 108.4 kg (239 lb)        Labs reviewed in EPIC  Problem list, Medication list, Allergies, and Medical/Social/Surgical histories reviewed in [facility] and updated as appropriate.     ROS: Constitutional, neuro, ENT, endocrine, pulmonary, cardiac, gastrointestinal, genitourinary, musculoskeletal, integument and psychiatric systems are negative, except as otherwise noted above in the HPI.   OBJECTIVE:                                                    /64   Pulse 93    Temp 97.4  F (36.3  C) (Temporal)   Resp 18   Wt 108.9 kg (240 lb)   LMP 01/06/2015   SpO2 94%   BMI 46.87 kg/m    Body mass index is 46.87 kg/m .  GENERAL: healthy, alert, well nourished, well hydrated, no distress  EYES: Eyes grossly normal to inspection, extraocular movements - intact  RESP: lungs clear to auscultation - no rales, no rhonchi, no wheezes  CV: regular rates and rhythm, normal S1 S2, no S3 or S4 and no murmur  ABDOMEN: soft, no tenderness,  normal bowel sounds  MS: extremities- no gross deformities noted, no edema  SKIN: no suspicious lesions, no rashes  NEURO: alert and oriented.   BACK: no CVA tenderness, no paralumbar tenderness  Mental Status Assessment:  Appearance:   Appropriate   Eye Contact:   Good   Psychomotor Behavior: Normal   Attitude:   Cooperative   Orientation:   All  Speech   Rate / Production: Normal    Volume:  Normal   Mood:    Depressed  Sad   Affect:    Subdued   Thought Content:  Clear   Thought Form:  Coherent  Logical   Insight:    Fair   Attention Span and Concentration: appropriate  Recent and Remote Memory:  intact  Fund of Knowledge: appropriate  Muscle Strength and Tone: normal   Suicidal Ideation: reports no thoughts, no intention  Hallucination: Yes  Paranoid-No  Manic-No  Panic-No  Self harm-No    See [facility] notes     Diagnostic Test Results:  Results for orders placed or performed during the hospital encounter of 03/30/19   Chest XR,  PA & LAT    Narrative    CHEST TWO VIEWS  3/30/2019 5:25 PM     HISTORY: Chest pain.    COMPARISON: 10/17/2018      Impression    IMPRESSION: Cardiomegaly is unchanged. Otherwise normal.    FELIPE WILLETT MD   CBC (platelets, no diff)   Result Value Ref Range    WBC 9.0 4.0 - 11.0 10e9/L    RBC Count 3.31 (L) 3.8 - 5.2 10e12/L    Hemoglobin 10.3 (L) 11.7 - 15.7 g/dL    Hematocrit 33.6 (L) 35.0 - 47.0 %     (H) 78 - 100 fl    MCH 31.1 26.5 - 33.0 pg    MCHC 30.7 (L) 31.5 - 36.5 g/dL    RDW 14.2 10.0 - 15.0 %    Platelet Count  195 150 - 450 10e9/L   Basic metabolic panel (BMP)   Result Value Ref Range    Sodium 139 133 - 144 mmol/L    Potassium 4.8 3.4 - 5.3 mmol/L    Chloride 109 94 - 109 mmol/L    Carbon Dioxide 26 20 - 32 mmol/L    Anion Gap 4 3 - 14 mmol/L    Glucose 135 (H) 70 - 99 mg/dL    Urea Nitrogen 25 7 - 30 mg/dL    Creatinine 1.28 (H) 0.52 - 1.04 mg/dL    GFR Estimate 46 (L) >60 mL/min/[1.73_m2]    GFR Estimate If Black 53 (L) >60 mL/min/[1.73_m2]    Calcium 9.0 8.5 - 10.1 mg/dL   Troponin I   Result Value Ref Range    Troponin I ES <0.015 0.000 - 0.045 ug/L   EKG 12 lead   Result Value Ref Range    Interpretation ECG Click View Image link to view waveform and result      *Note: Due to a large number of results and/or encounters for the requested time period, some results have not been displayed. A complete set of results can be found in Results Review.        ASSESSMENT/PLAN:                                                    (E11.3299,  Z79.4) Type 2 diabetes mellitus with mild nonproliferative retinopathy without macular edema, with long-term current use of insulin, unspecified laterality (H)  (primary encounter diagnosis)  Comment: as noted above. Patient is interested in switching to Freestyle Gabby to continue with the frequent blood sugar monitoring. Patient also requesting to be off his prednisone because of the high blood sugar readings because she believes that the new medication is working and she has decreased pain compared to before the medication was started.   Plan: Started patient on prednisone taper. Patient aware that she will need to call the clinic if the pain increases with the taper.   -Patient to follow up with Rheumatology as planned.     (M25.511,  G89.29,  M25.512) Chronic pain of both shoulders  Comment: Improving pain level with the leflunomide.   Plan: predniSONE (DELTASONE) 5 MG tablet        Taper as noted on the prescription bottle: May 7: Decrease to 10 mg daily,  May 11 decrease to 7.5 mg  daily and then May 14 decrease to 5 mg daily.    (K59.00) Constipation, unspecified constipation type  Comment: Reports that she has been having some stool incontinence.   Plan: polyethylene glycol (MIRALAX/GLYCOLAX) powder        Switched miralax to every other day.     (N39.46) Mixed incontinence  Comment: as noted above.   Plan: order for DME        -Use depends daily as needed.   - Decrease miralax use. Will discuss bladder training at the next appointment.       Patient Instructions   -Prednisone: May 7: Decrease to 10 mg daily,  May 11 decrease to 7.5 mg daily and then May 14 decrease to 5 mg daily.   -Daily Depends ordered.   -Ozempic order to replace trulicity.   -Trying to keep the light on at night for the CPAP use, or try to use it during the day for practice.     I spent Greater than 40 minutes was spent face to face with Nena Tang, with greater than 50 % in counselling and consultation about Diabetes, Chronic pain, constipation and Incontinence.     ART Fay [facility]

## 2019-05-07 NOTE — PATIENT INSTRUCTIONS
-Prednisone: May 7: Decrease to 10 mg daily,  May 11 decrease to 7.5 mg daily and then May 14 decrease to 5 mg daily.   -Daily Depends ordered.   -Ozempic order to replace trulicity.   -Trying to keep the light on at night for the CPAP use, or try to use it during the day for practice.

## 2019-05-07 NOTE — TELEPHONE ENCOUNTER
Pres[place] per [facility] [place]    Refused Prescriptions:                       Disp   Refills    dulaglutide (TRULICITY) 1.5 MG/0.5ML pen   0.01 mL0        Sig: Inject 1.5 mg Subcutaneous every 7 days  Refused By: PATI GARCIA  Reason for Refusal: Duplicate      Closing encounter - no further actions needed at this time    Pati Garcia RN

## 2019-05-07 NOTE — TELEPHONE ENCOUNTER
Requested Prescriptions   Pending Prescriptions Disp Refills     dulaglutide (TRULICITY) 1.5 MG/0.5ML pen  Last Written Prescription Date:  1/14/19  Last Fill Quantity: 2 ml,  # refills: 3   Last office visit: 4/30/2019 with prescribing provider:     Future Office Visit:   Next 5 appointments (look out 90 days)    May 07, 2019  2:00 PM CDT  Return Visit with ART Arias CNP  [facility] ([facility]) [address] [place] [address]  [address] [place] [id number] [phone]   May 07, 2019  2:00 PM CDT  Return Visit with Dennis Diana  [facility] ([facility]) [address] [place] [address]  [address] [place] [id number] [phone]   May 07, 2019  2:00 PM CDT  Office Visit with Eugenia De Jesus RPH  [facility] ([facility]) [address] [place] [id number] [phone]   May 15, 2019 12:30 PM CDT  (Arrive by 12:15 PM)  Office Visit with Lauren Turner Bloch, RPH  [facility] ([facility]) [address] [place] [id number] [phone]   May 21, 2019 12:00 PM CDT  Return Visit with Eladio Kwong MD  [facility] [address] [id number] [phone]          2 mL 3     Sig: Inject 1.5 mg Subcutaneous every 7 days       GLP-1 Agonists Protocol Failed - 5/7/2019 11:31 AM        Failed - Normal serum creatinine on file in past 12 months     Recent Labs   Lab Test 03/30/19  1659   CR 1.28*             Passed - Blood pressure less than 140/90 in past 6 months     BP Readings from Last 3 Encounters:   04/30/19 102/60   04/15/19 127/69   04/12/19 122/70                 Passed - LDL on file in past 12 months     Recent Labs   Lab Test 08/06/18  1038   LDL 84          "    Passed - Microalbumin on file in past 12 months     Recent Labs   Lab Test 09/06/18  1207   MICROL <5   UMALCR Unable to calculate due to low value             Passed - HgbA1C in past 3 or 6 months     If HgbA1C is 8 or greater, it needs to be on file within the past 3 months.  If less than 8, must be on file within the past 6 months.     Recent Labs   Lab Test 12/20/18  1532   A1C 6.4*             Passed - Medication is active on med list        Passed - Patient is age 18 or older        Passed - No active pregnancy on record        Passed - No positive pregnancy test in past 12 months        Passed - Recent (6 mo) or future (30 days) visit within the authorizing provider's specialty     Patient had office visit in the last 6 months or has a visit in the next 30 days with authorizing provider.  See \"Patient Info\" tab in inbasket, or \"Choose Columns\" in Meds & Orders section of the refill encounter.              "

## 2019-05-07 NOTE — PROGRESS NOTES
[facility] - Integrated Primary Care   May 7, 2019    Behavioral Health Clinician Progress Note    Patient Name: Nena Tang           Service Type:  Individual      Service Location:   Face to Face in Clinic     Session Start Time: 2:05pm Session End Time: 2:35pm      Session Length: 16 - 37      Attendees: Patient and PCP and MTM    Visit Activities (Refresh list every visit): [facility]    Diagnostic Assessment Date: 1-23-18  Treatment Plan Review Date: Not completed yet  See Flowsheets for today's PHQ-9 and TAMIA-7 results  Previous PHQ-9:   PHQ-9 SCORE 2/3/2017 3/13/2018 10/26/2018   PHQ-9 Total Score - - -   PHQ-9 Total Score - - -   PHQ-9 Total Score 0 14 20     Previous TAMIA-7:   TAMIA-7 SCORE 2/3/2017 3/13/2018 10/26/2018   Total Score - - -   Total Score 0 17 19   Total Score - - -       ALEXANDRA LEVEL:  ALEXANDRA Score (Last Two) 8/30/2011 6/9/2014   ALEXANDRA Raw Score 42 37   Activation Score 66 49.9   ALEXANDRA Level 3 2       DATA  Extended Session (60+ minutes): No  Interactive Complexity: No  Crisis: No  [facility] Patient: No    Treatment Objective(s) Addressed in This Session:  Target Behavior(s): disease management/lifestyle changes mental health    Depressed Mood: Increase interest, engagement, and pleasure in doing things  Decrease frequency and intensity of feeling down, depressed, hopeless  Improve quantity and quality of night time sleep / decrease daytime naps  Feel less tired and more energy during the day   Identify negative self-talk and behaviors: challenge core beliefs, myths, and actions  Improve concentration, focus, and mindfulness in daily activities   Decrease thoughts that you'd be better off dead or of suicide / self-harm  Anxiety: will experience a reduction in anxiety, will develop more effective coping skills to manage anxiety symptoms, will develop healthy cognitive patterns and beliefs and will increase ability to function adaptively  Alcohol / Substance Use: continue to make healthy choices  regarding substance use and engage in activities / supportive services that promote sobriety  Thought Disturbance: will develop skills to more effectively manage symptoms, will develop more effective support systems and will continue to take medications as prescribed    Current Stressors / Issues:  The patient was seen by [facility] per the request of the PCP. Pt states she is most concerned about her inability to get sleep. She has been thinking more about using her CPAP machine which she has not used in over one year. She feels that without using the CPAP machine she has been getting less restful sleep and is more irritable during the day. Pt states she had a helpful conversation with her psychiatrist yesterday who encouraged her to use this machine to help her have more restful sleep. Pt states that one primary reason she stopped using the CPAP machine was due to experiencing bodily sensations (difficulty breathing) where it felt like someone was trying to choke or suffocate her. This writer and PCP encouraged the pt to discuss this with her mental health therapist who she is seeing next week to discuss interventions to alleviate her fear and anxiety related to this sensation. We also discussed pt's interest in wanting to get off of her prednisone due to her blood sugar numbers not being great. Discussed with PCP and MTM and they agreed to taper pt off this medication. Pt states that although her mood has been better than usual, she has been feeling somewhat down due to her sister's health continuing to decline. Pt also reports that she has been thinking more about mother's day as this is a difficult day for her. She states that she will be with her son and is interested in going to a bingo game. She reports that her mother loved [place] and we discussed how this is a way she can remember what made her mother such a great person. Pt states that she has been thinking more about how she has not been living her life to the  "fullest since her mother and  passed away. She states in a symbolic way of \"letting go\" she is planning to spread their ashes in a river, possibly as soon as this mother's day.     Progress on Treatment Objective(s) / Homework:  Minimal progress - ACTION (Actively working towards change); Intervened by reinforcing change plan / affirming steps taken    Motivational Interviewing    MI Intervention: Expressed Empathy/Understanding, Supported Autonomy, Collaboration, Evocation, Permission to raise concern or advise, Open-ended questions, Reflections: simple and complex, Rolled with resistance: Emphasized patient autonomy, Simple reflection and Evoked patient agenda and Change talk (evoked)     Change Talk Expressed by the Patient: Desire to change Ability to change Reasons to change Need to change Committment to change Activation Taking steps    Provider Response to Change Talk: E - Evoked more info from patient about behavior change, A - Affirmed patient's thoughts, decisions, or attempts at behavior change, R - Reflected patient's change talk and S - Summarized patient's change talk statements      Care Plan review completed: No    Medication Review:  No changes to current psychiatric medication(s)     Medication Compliance:  NA    Changes in Health Issues:   None reported     Chemical Use Review:   Substance Use: Chemical use reviewed, no active concerns identified      Tobacco Use: No current tobacco use.      Assessment: Current Emotional / Mental Status (status of significant symptoms):  Risk status (Self / Other harm or suicidal ideation)  Patient has had a history of suicidal ideation: yes  Patient denies current fears or concerns for personal safety.  Patient reports the following current or recent suicidal ideation or behaviors: passive suicidal thoughts.  Patient denies current or recent homicidal ideation or behaviors.  Patient denies current or recent self injurious behavior or ideation.  Patient " denies other safety concerns.  A safety and risk management plan has not been developed at this time, however patient was encouraged to call [place] [phone] should there be a change in any of these risk factors.    Appearance:   Appropriate   Eye Contact:   Good   Psychomotor Behavior: Normal   Attitude:   Cooperative   Orientation:   All  Speech   Rate / Production: Normal    Volume:  Normal   Mood:    Normal  Affect:    Appropriate  Bright   Thought Content:  Clear   Thought Form:  Coherent   Insight:    Good     Diagnoses:  1. Schizoaffective disorder, depressive type (H)      Collateral Reports Completed:  Not Applicable    Plan: (Homework, other):  Patient was given information about behavioral services and encouraged to schedule a follow up appointment with the clinic [facility] as needed.  She was also given information about mental health symptoms and treatment options .  CD Recommendations: Maintain Sobriety. LORRAINE Dacosta, LGSW, 5.7.19   Supervised and Reviewed by Riaz [facility]      ______________________________________________________________________

## 2019-05-07 NOTE — PROGRESS NOTES
SUBJECTIVE/OBJECTIVE:                Nena Tang is a 59 year old female coming in for a follow-up visit for Medication Therapy Management.  She was referred to me from Rowena Haas Seen as a covisit with PCP and Charlie [facility] [place].     Chief Complaint: Follow up from our visit on 1/30/19.    1. Wants to talk about using her CPAP   2. Blood sugars are high, would like to stop prednisone    Tobacco: No tobacco use  Alcohol: not currently using     Medication Adherence/Access:  no issues reported, set up and administered by group home staff, except when pt leaves group home (ex lunches at [facility] in [place])    Shoulder pain: Taking leflunomide 20mg daily (new per rheumatology), prednisone 12.5mg daily.  Shoulder pain and ROM have improved with starting leflunomide.    Side effects: elevated BS, worsened mood/agitation, insomnia, increased appetite.   Rheumatology follow-up 5/21    Weight management: currently taking topiramate 75mg BID (called [facility] pharmacy and last dispensed 4/19 for no charge - does not appear to be any cost issues or coverage issues with Medicare) and Trulicity 1.5mg weekly (nearly out, needs refill).  Positive benefit with appetite reduction in topiramate but has not noted any weight loss.   Possible side effects: left hand twitching/shaking worse in the morning, sometimes will last all day. Causes her to drop things, hard to check blood sugar and give injection at times.     Schizoaffective: Currently taking sertraline 200mg daily, Invega 9mg daily, benztropine 1mg BID.   Mood has been down overall. Worried about her sister's health, recent admission for blood clots.   Visual hallucination has been worse lately with the man in her room.  She doesn't like to wear the CPAP because she feels like the man has his hand on his face. Nightlight is helpful for eliminating hallucination. Feels tired.  Works with a regular therapist.   Psychiatry Dr Pedersen, last visit yesterday  Side effects:  "Continues to have some mouth/tongue movements but greatly improved with benztropine.     Diabetes:  Pt currently taking lantus 48u QHS, novolog 20u usually 2 times a day with meals and eating away from group home frequently at drop in [facility] Novolog when she is off site, and trulicity 1.5mg subcutaneously each week.   BS have been elevated since starting prednisone.  Pt is not experiencing side effects.   Hasn't been able to  Freestyle Gabby, unsure why. States her fingers \"are dead\"  SMBG: 3 times daily. Ranges (glucose log)  Date FBG/ 2hours post Lunch/2hours post Dinner /2hours post    5/7 202      5/6 166 279 398/278     156 301 291/315     159 294 442/311     220 282 383/436     158 224 /362     126 213 288/231       Patient is not experiencing hypoglycemia   Recent symptoms of high blood sugar? none  Eye exam: up to date  Foot exam: up to date  Microalbumin is < 30 mg/g. Pt is taking an ACEi/ARB.  Aspirin: Taking 81mg daily and denies side effects  Diet/Exercise:  Appetite is returning  Lab Results   Component Value Date    A1C 6.4 12/20/2018    A1C 6.4 08/06/2018    A1C 6.2 05/22/2018    A1C 6.8 02/12/2018    A1C 8.9 12/09/2017       Today's Vitals: LMP 01/06/2015   BP Readings from Last 1 Encounters:   05/07/19 110/64     Pulse Readings from Last 1 Encounters:   05/07/19 93     Wt Readings from Last 1 Encounters:   05/07/19 240 lb (108.9 kg)     Ht Readings from Last 1 Encounters:   04/30/19 5' (1.524 m)     Estimated body mass index is 46.87 kg/m  as calculated from the following:    Height as of 4/30/19: 5' (1.524 m).    Weight as of an earlier encounter on 5/7/19: 240 lb (108.9 kg).    Temp Readings from Last 1 Encounters:   05/07/19 97.4  F (36.3  C) (Temporal)         ASSESSMENT:              Current medications were reviewed today as discussed above.      Medication Adherence: good, no issues identified    Shoulder pain: improved. Recommend prednisone taper due to numerous side effects " and follow-up with rheumatology as scheduled.    Weight management: unimproved. Switch to Ozempic, see below. Muscle jerking is more likely a side effect from Invega instead of topiramate, will consider dose adjustment at follow-up.     Diabetes: needs improvement. Elevated BS 2/2 prednisone. Consider starting taper off since pain has improved. Reviewed recommendation from weight management clinic to switch Trulicity to Ozempic for better weight loss profile and pt is agreeable. Education provided on injection technique.   Called [facility] and need PA for Freestyle Gabby, asked for paperwork to be faxed.    schizoaffective: improved. Encouraged leaving on light and practicing use of CPAP to increase comfort for restarting.        PLAN:                  1. Stop Trulicity.  Start Ozempic 0.25mg weekly x4 weeks, then 0.5mg weekly  2. Start PA for Freestyle Gabby.   3. Decrease prednisone by 2.5mg every 3 days as tolerated to 5mg daily and follow-up with rheumatology for further instructions - OK per PCP    I spent 60 minutes with this patient today. All changes were made via collaborative practice agreement with Rowena Haas. A copy of the visit note was provided to the patient's primary care provider.     Will follow up in 1 month.    The patient was given a summary of these recommendations as an after visit summary.    Eugenia De Jesus, PharmD, BCACP

## 2019-05-08 DIAGNOSIS — M25.50 POLYARTHRALGIA: ICD-10-CM

## 2019-05-08 RX ORDER — LEFLUNOMIDE 20 MG/1
TABLET ORAL
Qty: 30 TABLET | Refills: 0 | Status: SHIPPED | OUTPATIENT
Start: 2019-05-08 | End: 2019-05-21

## 2019-05-08 NOTE — TELEPHONE ENCOUNTER
Refill request for leflunomide (ARAVA) 20 MG tablet    Medication last filled 3/26/19 Quantity 60 tabs Refills 0    Last rheumatology office visit 4/9/19  Future rheumatology office visit 5/21/19    Lab Results   Component Value Date    WBC 9.0 03/30/2019    HGB 10.3 03/30/2019     03/30/2019    MCH 31.1 03/30/2019    MCHC 30.7 03/30/2019    RDW 14.2 03/30/2019     03/30/2019     Lab Results   Component Value Date    ALT 20 01/30/2019     Lab Results   Component Value Date    AST 14 01/30/2019     Lab Results   Component Value Date    CR 1.28 03/30/2019     Lab Results   Component Value Date    URIC 3.3 01/17/2012

## 2019-05-10 ENCOUNTER — OFFICE VISIT (OUTPATIENT)
Dept: PALLIATIVE MEDICINE | Facility: CLINIC | Age: 59
End: 2019-05-10
Payer: MEDICARE

## 2019-05-10 DIAGNOSIS — G89.4 CHRONIC PAIN SYNDROME: Primary | ICD-10-CM

## 2019-05-10 PROCEDURE — 97162 PT EVAL MOD COMPLEX 30 MIN: CPT | Mod: GP | Performed by: PHYSICAL THERAPIST

## 2019-05-10 PROCEDURE — 97112 NEUROMUSCULAR REEDUCATION: CPT | Mod: GP | Performed by: PHYSICAL THERAPIST

## 2019-05-10 NOTE — PROGRESS NOTES
[place] - Rheumatology Clinic Visit     Nena Tang MRN# [id number]   YOB: 1960    Primary care provider: Rowena Haas  May 21, 2019          Assessment and Plan:   # Steroid responsive polyarthralgia - onset 2018  # Positive AIDA 1:160 centromere pattern  # Anemia   # Elevated sed rate  # Osteopenia  # Coronary artery disease; h/o takotsuba cardiomyopathy  # Type 2 diabetes mellitus  # H/o uterine cancer - decades ago  # Past h/o suicidal ideation; past h/o cocaine/heroin use  # Retinopathy due to Diabetes mellitus    Creat, AST, ALT within normal limits   CK within normal limits  RF, CCP neg.   TSH within normal limits  Vit D within normal limits    TPMT enzyme not normal.   Lupus activity labs are all normal.     Some of the specific antibodies for lupus, mixed connective tissue disease, scleroderma, sjogrens syndrome are normal (negative)   Sed rate is improving. Good.     There is some suspicion for autoimmune connective tissue disorder. No classic synovitis.     Meloxicam did not help. Prednisone elevated blood sugars. We avoided plaquenil because of retinopathy. Avoid azathioprine because of abnormal TPMT.   Leflunomide started about about 4/19. Tolerating well and polyarthralgia improving. Continue 20 mg PO daily.   Labs every month X 3.     Notified patient that my last day at clinic in [place] is mid-June. Gave options regarding other rheumatologists nearby for her.     The labs from patient records are reviewed.     I will be back in touch with the patient through mychart/letter when results are available.     Patient agrees with the above mentioned treatment plan.     Most Recent Immunizations   Administered Date(s) Administered     Influenza (IIV3) PF 09/24/2012     Influenza Vaccine IM 3yrs+ 4 Valent IIV4 09/06/2018     Mantoux Tuberculin Skin Test 07/10/2014     Pneumococcal 23 valent 06/20/2011     TDAP Vaccine (Adacel) 01/22/2009     Td (Adult), Adsorbed 08/14/2001      Twinrix A/B 01/08/2019     Zoster vaccine recombinant adjuvanted (SHINGRIX) 01/08/2019       Orders Placed This Encounter   Procedures     RHEUMATOLOGY REFERRAL       No follow-ups on file.    Medications Discontinued During This Encounter   Medication Reason     leflunomide (ARAVA) 20 MG tablet Reorder     Current Outpatient Medications   Medication Sig Dispense Refill     acetaminophen (TYLENOL) 500 MG tablet Take 2 tablets (1,000 mg) by mouth 3 times daily as needed for mild pain 100 tablet 3     albuterol (PROAIR HFA/PROVENTIL HFA/VENTOLIN HFA) 108 (90 Base) MCG/ACT Inhaler Inhale 2 puffs into the lungs every 6 hours as needed for shortness of breath / dyspnea or wheezing 1 Inhaler 0     aspirin (ASA) 81 MG EC tablet TAKE 1 TABLET (81MG) BY MOUTH DAILY 30 tablet 3     atorvastatin (LIPITOR) 80 MG tablet TAKE 1 TABLET (80MG) BY MOUTH DAILY 30 tablet 11     benztropine (COGENTIN) 0.5 MG tablet Take 2 tablets (1 mg) by mouth 2 times daily 120 tablet 2     blood glucose (NO BRAND SPECIFIED) test strip Use to test blood sugar  4 times daily or as directed. To accompany: Blood Glucose Monitor Brands: per insurance. 100 strip 11     calcium carbonate (OS-ALLEN) 1500 (600 Ca) MG tablet Take 3 tablets (1,800 mg) by mouth daily 180 tablet 3     cholecalciferol (VITAMIN D3) 62633 units (1250 mcg) capsule TAKE 1 CAPSULE (50,000 UNITS) MONTHLY 4 capsule 3     Continuous Blood Gluc  (FREESTYLE LEBRON 14 DAY READER) RO 1 Device 4 times daily For continuous glucose monitoring. 1 Device 0     Continuous Blood Gluc Sensor (FREESTYLE LEBRON 14 DAY SENSOR) MISC 1 Device every 14 days 6 each 3     diclofenac (VOLTAREN) 1 % topical gel Place 4 g onto the skin 4 times daily as needed for moderate pain 1 Tube 1     gabapentin (NEURONTIN) 300 MG capsule Take 600 mg by mouth 2 times daily morning and afternoon       gabapentin (NEURONTIN) 300 MG capsule Take 900 mg by mouth At Bedtime       glucose 4 G CHEW chewable tablet Take 2  every 15 minutes for blood sugar <70mg/dL. Recheck blood sugar every 15 minutes until above 70mg/dL, then eat a substantial meal. 20 tablet 1     guaiFENesin (ROBITUSSIN) 100 MG/5ML liquid Take 10 mLs (200 mg) by mouth every 4 hours as needed for cough 560 mL 1     insulin aspart (NOVOLOG FLEXPEN) 100 UNIT/ML pen Inject 22 Units Subcutaneous 3 times daily (with meals) Once daily, can add additional 5 units if BGs are >500mg/dL. 15 mL 11     insulin glargine (LANTUS SOLOSTAR PEN) 100 UNIT/ML pen Inject 50 Units Subcutaneous At Bedtime 3 mL 11     insulin pen needle (NOVOFINE 30) 30G X 8 MM miscellaneous USE 4 DAILY OR AS DIRECTED 400 each 0     ipratropium - albuterol 0.5 mg/2.5 mg/3 mL (DUONEB) 0.5-2.5 (3) MG/3ML neb solution Take 1 vial (3 mLs) by nebulization every 6 hours as needed for shortness of breath / dyspnea or wheezing 30 vial 0     leflunomide (ARAVA) 20 MG tablet Take 1 tablet (20 mg) by mouth daily 60 tablet 0     losartan (COZAAR) 50 MG tablet Take 1 tablet (50 mg) by mouth daily 90 tablet 3     melatonin 5 MG CAPS Take 2 capsules by mouth At Bedtime 180 capsule 3     metoprolol succinate ER (TOPROL-XL) 25 MG 24 hr tablet Take 1 tablet (25 mg) by mouth daily 90 tablet 1     order for DME Equipment being ordered: Depends. 30 each 1     order for DME Equipment being ordered: Freestyle Gabby [place] 1 Device 0     paliperidone ER (INVEGA) 9 MG 24 hr tablet Take 1 tablet (9 mg) by mouth every morning 30 tablet 2     polyethylene glycol (MIRALAX/GLYCOLAX) powder TAKE 8.5 GRAMS (1/2 CAPFUL) BY MOUTH EVERY OTHER DAY. 527 g 1     ranitidine (ZANTAC) 300 MG tablet TAKE 1 TABLET (300MG) BY MOUTH AT BEDTIME 90 tablet 1     Semaglutide (OZEMPIC) 0.25 or 0.5 MG/DOSE SOPN Inject 0.25 mg Subcutaneous once a week for 28 days, THEN 0.5 mg once a week for 28 days. 6 mL 1     senna-docusate (SENOKOT-S;PERICOLACE) 8.6-50 MG per tablet Take 1 tablet by mouth 2 times daily as needed for constipation 100 tablet 1      sertraline (ZOLOFT) 100 MG tablet Take 2 tablets (200 mg) by mouth daily 60 tablet 2     spacer (OPTICHAMBER PATRICIA) holding chamber Holding/spacer device to use with inhaler. 1 each 0     SUMAtriptan (IMITREX) 25 MG tablet Take 1-2 tablets (25-50 mg) by mouth at onset of headache for migraine May repeat in 2 hours. Max 8 tablets/24 hours. 12 tablet 1     topiramate (TOPAMAX) 50 MG tablet Take 1.5 tablets (75 mg) by mouth 2 times daily 90 tablet 3       Eladio Kwong MD  [facility]          Active Problem List:     Patient Active Problem List    Diagnosis Date Noted     CAD (coronary artery disease) 02/29/2012     Priority: High      Tako-Tsubo stress cardiomyopathy 2009. Mild coronary artery disease,        Hyperglycemia 05/15/2019     Priority: Medium     Positive AIDA (antinuclear antibody) 01/31/2019     Priority: Medium     Type 2 diabetes mellitus with stage 3 chronic kidney disease, with long-term current use of insulin (H) 06/05/2018     Priority: Medium     Cervical cancer screening 06/01/2018     Priority: Medium     Pt age 58  05/22/18: She had a total hysterectomy 35 years ago for uterine cancer, NIL pap, Neg HR HPV result. Plan cease pap screening per provider.  No history of MEHREEN 2 or greater found in epic.   No further cervical cancer screening recommended.   Hm updated.              Posttraumatic stress disorder 01/29/2018     Priority: Medium     Gastroenteritis 12/08/2017     Priority: Medium     Thoughts of self harm 10/23/2017     Priority: Medium     Infectious encephalopathy 09/04/2017     Priority: Medium     Non-intractable vomiting with nausea 09/04/2017     Priority: Medium     Abdominal pain, right lower quadrant 07/13/2017     Priority: Medium     Asymptomatic postmenopausal status 06/28/2017     Priority: Medium     Dysuria 06/12/2017     Priority: Medium     Psychophysiological insomnia 03/09/2017     Priority: Medium     History of uterine cancer 12/07/2016      Priority: Medium     Yearly pap's per the provider office visit note on 12/07/16.  05/22/18: She had a total hysterectomy 35 years ago for uterine cancer, NIL pap, Neg HR HPV result. Plan cease pap screening per provider.       Falls frequently 08/18/2016     Priority: Medium     Left cataract 07/25/2016     Priority: Medium     Alcohol use 04/19/2016     Priority: Medium     Tardive dyskinesia 09/11/2015     Priority: Medium     Mild nonproliferative diabetic retinopathy (H) 06/19/2014     Priority: Medium     Problem list name updated by automated process. Provider to review       Esophageal reflux 06/09/2014     Priority: Medium     Suicidal ideation 05/01/2014     Priority: Medium     Cocaine abuse, episodic (H) 10/03/2013     Priority: Medium     Lumbago 09/20/2013     Priority: Medium     Cervicalgia 09/20/2013     Priority: Medium     Health Care Home 08/16/2013     Priority: Medium     EMERGENCY CARE PLAN  Presenting Problem Signs and Symptoms Treatment Plan    Questions or concerns during clinic hours    I will call the clinic directly     Questions or concerns outside clinic hours    I will call the 24 hour nurse line at [phone]    Patient needs to schedule an appointment    I will call the 24 hour scheduling team at [phone] or clinic directly    Same day treatment     I will call the clinic first, nurse line if after hours, urgent care and express care if needed     Primary Care Provider Dr. Honoroi Dias [facility] [phone]  Nurse Care Coordinator Idalmis Nettles [phone]        HTN, goal below 140/90 07/29/2013     Priority: Medium     Migraine headache 04/22/2013     Priority: Medium     Schizoaffective disorder, depressive type (H) 02/25/2013     Priority: Medium     Chronic low back pain 01/22/2013     Priority: Medium     Verbal auditory hallucination 10/04/2012     Priority: Medium     CINDY (obstructive sleep apnea)- mild AHI 10.3 03/08/2012     Priority:  Medium     Sleep study 3/12 (198#)-   AHI 10.3, with significant desaturations down to 74%. RDI 10.3. Periodic Limb Movement Index 3.2/hour.         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 Echo 2010, angio 2011 with normal LVF.        Vitamin B12 deficiency without anemia 11/23/2009     Priority: Low     Diagnosis updated by automated process. Provider to review and confirm.       Osteopenia 10/07/2009     Priority: Low     Dexa 2009- Lumbar Spine (L1-L4): T-score -1.8, Left Femoral Neck: T-score -1.0, Right Femoral Neck: T-score -1.0                   History of Present Illness:     Chief Complaint   Patient presents with     RECHECK       March 13, 2019  Have you ever seen a rheumatologist NO Who NA When NA  Joint pain history  Onset: Patient is here to establish care for multiple site pain in both shoulders and arms. Onset started in November 2018 and is continuous. Since starting on Prednisone pain has decreased.   Involved joints: both shoulders arms  Pain scale:  6/10 , since starting prednisone  Wakes the patient from sleep : Yes  Morning stiffness: Yes for 180 minutes  Meds used:tylenol, prednisone      Interim history  Since last visit:  1. Infections - No  2. New symptoms/medical problem - No  3. Any side effects from Rheum medications -NA  3. ER visits/Hospitalizations/surgeries - No  4. Last PCP visit: 2/27/19      Wt Readings from Last 4 Encounters:   05/21/19 109.3 kg (241 lb)   05/15/19 110 kg (242 lb 6.4 oz)   05/15/19 107.4 kg (236 lb 12.8 oz)   05/15/19 107.4 kg  (236 lb 12.8 oz)     Cough on and off  Epistaxis on and off; resolved after stopping flonase.   abdominal pain on and off    No h/o myalgia, unintentional weight loss, fevers, rash, swollen glands  No h/o glaucoma.  Patient denies any raynauds, malar rash, skin photosensitivity, recurrent mouth ulcers, or arterial/venous thrombosis in the past  No h/o persistent shortness of breath,  chest pain  No h/o persistent vomiting, diarrhea,   No h/o hematochezia, hematuria, hemoptysis  No h/o seizures  No h/o sicca symptoms    April 9, 2019  Have you ever seen a rheumatologist yes Who you When 3/14/19  Joint pain history  Onset: Patient is here for a follow up. Patient feels pain in shoulders is worse than before. Pain never goes away.  Involved joints: shoulders  Pain scale:  8/10     Wakes the patient from sleep : Yes  Morning stiffness:Yes for 10 minutes  Meds used:prednisone, leflunomide, tylenol     Interim history  Since last visit:  1. Infections - No  2. New symptoms/medical problem - Yes, creatine at 1.3, kidney issues they are looking at  3. Any side effects from Rheum medications -high sugar levels from prednisone  3. ER visits/Hospitalizations/surgeries - Yes, 3/30/19-kidney insufficiency   4. Last PCP visit: 4/5/19    May 21, 2019  Have you ever seen a rheumatologist yes Who you When 4/9/19  Joint pain history  Onset: Patient is here for a follow up. Patient reports feels pain is about the same as last time. Is doing PT, which believes helps.  Involved joints: Shoulders  Pain scale:  4/10     Wakes the patient from sleep : No  Morning stiffness:Yes for 2 minutes  Meds used:leflunomide     Interim history  Since last visit:  1. Infections - No  2. New symptoms/medical problem - No  3. Any side effects from Rheum medications -none  3. ER visits/Hospitalizations/surgeries - Yes, 5/15/19 hyperglycemia  4. Last PCP visit: 4/30/19      BP Readings from Last 3 Encounters:   05/21/19 136/76   05/16/19 150/60   05/15/19  108/60              Review of Systems:   Complete ROS negative except for symptoms mentioned in the HPI          Past Medical History:     Past Medical History:   Diagnosis Date     Acute respiratory failure with hypoxia (H) 9/4/2017     CAD (coronary artery disease)     5/2014 cath, nonbostructive stenosis to LAD, RCA.     Chronic low back pain 1/22/2013     Cocaine abuse, in remission (H)      Fecal urgency 3/8/2012     History of heroin abuse      Hyperlipidemia LDL goal <100 10/31/2010     Hypertension 7/29/2013     Illiterate 8/30/2011     Irritable bowel syndrome      Left cataract      Migraine 4/19/2012     Migraine headache 4/22/2013     Moderate major depression (H) 6/8/2011     Noncompliance with medication regimen 6/8/2011     Obesity      CINDY (obstructive sleep apnea) 3/8/2012    uses CPAP     Osteopenia 10/7/2009     Pneumonia of right lower lobe due to infectious organism (H) 9/4/2017     Schizoaffective disorder, depressive type (H) 2/25/2013     Sepsis (H) 8/29/2017     Suicidal intent 10/2/2013     Takotsubo cardiomyopathy      Type 2 diabetes mellitus (H) 8/30/2011     Uterine cancer (H) 1983     Verbal auditory hallucination 10/4/2012     Past Surgical History:   Procedure Laterality Date     C OOPHORECTORMY FOR MALIG, W/BX  1983    UTERINE     CATARACT IOL, RT/LT Bilateral 2017     COLONOSCOPY N/A 3/16/2017    Procedure: COLONOSCOPY;  Surgeon: Traci Gonzalez MD;  Location:  GI     Coronary CTA  5/21/2014     HYSTERECTOMY  1983    uterine cancer yearly pap's per provider.     LAPAROSCOPIC CHOLECYSTECTOMY  2008     PHACOEMULSIFICATION CLEAR CORNEA WITH STANDARD INTRAOCULAR LENS IMPLANT Left 5/5/2017    Procedure: PHACOEMULSIFICATION CLEAR CORNEA WITH STANDARD INTRAOCULAR LENS IMPLANT;  LEFT EYE PHACOEMULSIFICATION CLEAR CORNEA WITH STANDARD INTRAOCULAR LENS IMPLANT ;  Surgeon: Tyra Diaz MD;  Location: [facility]     PHACOEMULSIFICATION CLEAR CORNEA WITH STANDARD INTRAOCULAR LENS  IMPLANT Right 2017    Procedure: PHACOEMULSIFICATION CLEAR CORNEA WITH STANDARD INTRAOCULAR LENS IMPLANT;  RIGHT EYE PHACOEMULSIFICATION CLEAR CORNEA WITH STANDARD INTRAOCULAR LENS IMPLANT;  Surgeon: Tyra Diaz MD;  Location:  [facility]     RELEASE TRIGGER FINGER  10/11/2012    Left thumb. Procedure: RELEASE TRIGGER FINGER;  LEFT THUMB TRIGGER RELEASE;  Surgeon: Tay Langley MD;  Location:  [facility]     RELEASE TRIGGER FINGER Right 2016    Procedure: RELEASE TRIGGER FINGER;  Surgeon: Albino Castañeda MD;  Location:  OR            Social History:     Social History     Occupational History     Not on file   Tobacco Use     Smoking status: Never Smoker     Smokeless tobacco: Never Used   Substance and Sexual Activity     Alcohol use: No     Frequency: Never     Comment: last month     Drug use: No     Comment: history of     Sexual activity: Never     Partners: Male     Birth control/protection: None, Condom            Family History:     Family History   Problem Relation Age of Onset     Cancer Mother         BLADDER     Respiratory Mother         COPD     Gastrointestinal Disease Mother         CIRRHOSIS OF LI BOLIVAR     Alcohol/Drug Mother      Diabetes Mother      Hypertension Mother      Lipids Mother      C.A.D. Mother      Glaucoma Mother      Alcohol/Drug Sister      Mental Illness Sister      Alcohol/Drug Sister      Psychotic Disorder Sister      Cancer Maternal Grandmother         UNKNOWN TYPE     Cancer Brother         COLON     Cancer - colorectal Brother         IN HIS LATE 30S     Alcohol/Drug Brother          OF HEROIN OVERDOSE AT AGE 22 YRS     Macular Degeneration No family hx of             Allergies:     Allergies   Allergen Reactions     Imidazole Antifungals Hives     Tolerates diflucan     Ketoprofen Itching     Pruritis to topical     Lisinopril Hives     Metformin Other (See Comments)     Patient hospitalized for lactic acidosis - admitting provider suspectd caused by  metformin     Metronidazole Hives     Posaconazole Hives     Tolerates diflucan            Medications:     Current Outpatient Medications   Medication Sig Dispense Refill     acetaminophen (TYLENOL) 500 MG tablet Take 2 tablets (1,000 mg) by mouth 3 times daily as needed for mild pain 100 tablet 3     albuterol (PROAIR HFA/PROVENTIL HFA/VENTOLIN HFA) 108 (90 Base) MCG/ACT Inhaler Inhale 2 puffs into the lungs every 6 hours as needed for shortness of breath / dyspnea or wheezing 1 Inhaler 0     aspirin (ASA) 81 MG EC tablet TAKE 1 TABLET (81MG) BY MOUTH DAILY 30 tablet 3     atorvastatin (LIPITOR) 80 MG tablet TAKE 1 TABLET (80MG) BY MOUTH DAILY 30 tablet 11     benztropine (COGENTIN) 0.5 MG tablet Take 2 tablets (1 mg) by mouth 2 times daily 120 tablet 2     blood glucose (NO BRAND SPECIFIED) test strip Use to test blood sugar  4 times daily or as directed. To accompany: Blood Glucose Monitor Brands: per insurance. 100 strip 11     calcium carbonate (OS-ALLEN) 1500 (600 Ca) MG tablet Take 3 tablets (1,800 mg) by mouth daily 180 tablet 3     cholecalciferol (VITAMIN D3) 49472 units (1250 mcg) capsule TAKE 1 CAPSULE (50,000 UNITS) MONTHLY 4 capsule 3     Continuous Blood Gluc  (FREESTYLE LEBRON 14 DAY READER) RO 1 Device 4 times daily For continuous glucose monitoring. 1 Device 0     Continuous Blood Gluc Sensor (FREESTYLE LEBRON 14 DAY SENSOR) MISC 1 Device every 14 days 6 each 3     diclofenac (VOLTAREN) 1 % topical gel Place 4 g onto the skin 4 times daily as needed for moderate pain 1 Tube 1     gabapentin (NEURONTIN) 300 MG capsule Take 600 mg by mouth 2 times daily morning and afternoon       gabapentin (NEURONTIN) 300 MG capsule Take 900 mg by mouth At Bedtime       glucose 4 G CHEW chewable tablet Take 2 every 15 minutes for blood sugar <70mg/dL. Recheck blood sugar every 15 minutes until above 70mg/dL, then eat a substantial meal. 20 tablet 1     guaiFENesin (ROBITUSSIN) 100 MG/5ML liquid Take 10 mLs  (200 mg) by mouth every 4 hours as needed for cough 560 mL 1     insulin aspart (NOVOLOG FLEXPEN) 100 UNIT/ML pen Inject 22 Units Subcutaneous 3 times daily (with meals) Once daily, can add additional 5 units if BGs are >500mg/dL. 15 mL 11     insulin glargine (LANTUS SOLOSTAR PEN) 100 UNIT/ML pen Inject 50 Units Subcutaneous At Bedtime 3 mL 11     insulin pen needle (NOVOFINE 30) 30G X 8 MM miscellaneous USE 4 DAILY OR AS DIRECTED 400 each 0     ipratropium - albuterol 0.5 mg/2.5 mg/3 mL (DUONEB) 0.5-2.5 (3) MG/3ML neb solution Take 1 vial (3 mLs) by nebulization every 6 hours as needed for shortness of breath / dyspnea or wheezing 30 vial 0     leflunomide (ARAVA) 20 MG tablet Take 1 tablet (20 mg) by mouth daily 60 tablet 0     losartan (COZAAR) 50 MG tablet Take 1 tablet (50 mg) by mouth daily 90 tablet 3     melatonin 5 MG CAPS Take 2 capsules by mouth At Bedtime 180 capsule 3     metoprolol succinate ER (TOPROL-XL) 25 MG 24 hr tablet Take 1 tablet (25 mg) by mouth daily 90 tablet 1     order for DME Equipment being ordered: Depends. 30 each 1     order for DME Equipment being ordered: Freestyle Gabby [place] 1 Device 0     paliperidone ER (INVEGA) 9 MG 24 hr tablet Take 1 tablet (9 mg) by mouth every morning 30 tablet 2     polyethylene glycol (MIRALAX/GLYCOLAX) powder TAKE 8.5 GRAMS (1/2 CAPFUL) BY MOUTH EVERY OTHER DAY. 527 g 1     ranitidine (ZANTAC) 300 MG tablet TAKE 1 TABLET (300MG) BY MOUTH AT BEDTIME 90 tablet 1     Semaglutide (OZEMPIC) 0.25 or 0.5 MG/DOSE SOPN Inject 0.25 mg Subcutaneous once a week for 28 days, THEN 0.5 mg once a week for 28 days. 6 mL 1     senna-docusate (SENOKOT-S;PERICOLACE) 8.6-50 MG per tablet Take 1 tablet by mouth 2 times daily as needed for constipation 100 tablet 1     sertraline (ZOLOFT) 100 MG tablet Take 2 tablets (200 mg) by mouth daily 60 tablet 2     spacer (OPTICHAMBER PATRICIA) holding chamber Holding/spacer device to use with inhaler. 1 each 0     SUMAtriptan  (IMITREX) 25 MG tablet Take 1-2 tablets (25-50 mg) by mouth at onset of headache for migraine May repeat in 2 hours. Max 8 tablets/24 hours. 12 tablet 1     topiramate (TOPAMAX) 50 MG tablet Take 1.5 tablets (75 mg) by mouth 2 times daily 90 tablet 3            Physical Exam:   Blood pressure 136/76, pulse 88, temperature 98.1  F (36.7  C), temperature source Oral, height 1.524 m (5'), weight 109.3 kg (241 lb), last menstrual period 01/06/2015, SpO2 92 %, not currently breastfeeding.  Wt Readings from Last 4 Encounters:   05/21/19 109.3 kg (241 lb)   05/15/19 110 kg (242 lb 6.4 oz)   05/15/19 107.4 kg (236 lb 12.8 oz)   05/15/19 107.4 kg (236 lb 12.8 oz)       Constitutional: well-developed, appearing stated age; cooperative  MS: All elbow, wrist, MCP/PIP/DIP, hip, knee, ankle were examined and  found without active synovitis.shoulder abduction painful beyond 90 degrees.   Skin: no rash in upper extremities or face  Psych: nl judgement, orientation, memory, affect.         Data:         Eladio Kwong MD    [facility]

## 2019-05-10 NOTE — PROGRESS NOTES
PHYSICAL THERAPY INITIAL EVALUATION and PLAN OF CARE    Patient Name: Nena Tang     : 1960    MRN: [id number]   Pain Management Provider:  Naveen Beltran DO    Diagnosis: Chronic pain syndrome    SUBJECTIVE:    PRESENTATION AND ETIOLOGY    Chief Complaint: Bilateral shoulder pain, L > R   Per Dr. Beltran 2019: Presents with acute bilateral shoulder pain with significant myofascial component  Exam is non-focal. Low suspicion for rotator cuff tearing  Recommend pain physical therapy to address myofascial pain which is most causative  Early frozen shoulder could also be causing some decreased range of motion  Need to be sure she can manage blood sugars before doing a cortisone injection.   Would start with therapy first and if not improving can proceed with injection and would recommend one shoulder at a time.  Follow-up if pain is not improved after 4 therapy visits for cortisone injection.    Onset / Etiology: insidious onset 2 months ago    Pattern Since Onset: Unchanged      Frequency: Constant     Intensity: Worst 8/10;  Current 4/10     LEVEL OF FUNCTION AT START OF CARE    Walking tolerance: 60' with 4WW  Sitting tolerance: not limited  Housework tolerance: NA, lives in group home  Sleep: normally sleeps on side; wakes 2-3x/night to change positions due to shoulder pain    Current Aggrevating Activities / Functional Limitations: any movement of the shoulders, reaching behind head and behind back, putting on jacket, needs help with showering/grooming      CURRENT / PREVIOUS INTERVENTION(S):     Relieving Activities / Self Care: keeping arms rested at her sides    Previous / Current therapies for current chief complaint: PT: 2018 for gait/balance; 2011 for shoulder pain    DEMOGRAPHICS  Employment Status: Unemployed    Social Support: lives in adult foster care facility; attends adult day program    Pertinent Medical  / Surgical History: Epic Snapshot Reviewed, See provider's  note    Patient's goals for physical therapy: being able to use arms for reaching to get into van, dressing, bathing/grooming and sleep    ===============================================================  OBJECTIVE:  POSTURE:  Observation: Patient demonstrates forward head, protracted shoulders and thoracic kyphosis in standing and sitting posture.   Hypersensitive with palpation to bilateral shoulder girdle  GAIT, LOCOMOTION, and BALANCE:  Gait and Locomotion: slow velocity; did not bring walker with her today; impaired arm swing R > L    RANGE OF MOTION:   Cervical: AROM WFL all directions; negative pain in shoulders  Shoulders: AROM limited to 50% flexion, IR, ER and abduction bilaterally    MUSCLE PERFORMANCE:   Strength: demonstrates core instability; scapular weakness      Flexibility: tightness noted in bilateral upper traps, pectoralis, levator scapula    FUNCTIONAL TESTING/OBSERVATION: independent chair mobility     Pain behaviors: None    ===============================================================  Today's Treatment:  Initial evaluation  Neuromuscular Reeducation:   For 30 minutes including instruction in seated shoulder glides as tolerated; instruction in table top reaching with support from trunk/pelvis; instructed to trial cold vs heat to shoulders  Focus next session: continue shoulder glides; add scapular retraction/postural awareness; wall climbs, cane/towel AAROM, pulleys, bike  ===============================================================  ASSESSMENT:  Physical Therapy Diagnosis:Impaired Posture and Impaired Muscle Performance    Patient requires PT intervention for the following impairments: Limited knowledge of condition and / or self care - inability to control symptoms, Impaired functional mobility, Impaired posture / muscle imbalance, Joint hypomobility, Pain, ROM limitations, Soft tissue mobility limitations and Deconditioning    Anticipated Goals and Expected Outcomes:  8 weeks  Patient  will report the use of 2 self care practices during their day.  Patient will report the participation in 20 minutes of aerobic activity daily and practicing stretching daily.  Patient will demonstrate the ability to find core strength in neutral posture.  Patient will demonstrate the ability to relax muscle group before stretching.  16 weeks  Patient will be independent with a home exercise program.  Patient will be independent with posture correction.  Patient will report independence with a self care/flare management program.  Patient will demonstrate improved functional strength and endurance as reports by increased tolerance for IADLs and more consistent participation in daily activity.     Rehab potential for achieving goals: fair.    ===============================================================  PLAN:   Patient will benefit from skilled physical therapy consisting of:  neuromuscular reeducation of: kinesthetic sense and posture for sitting and/or standing activities, education in self care / home management training to include instruction in: symptom control techniques, therapeutic activities to achieve improved functional performance in: daily actvities and therapeutic exercise to develop: strength and endurance, flexibility and core stability    Assessment will be ongoing with changes in treatment as indicated.  Benefits/risks/alternatives to treatment have been reviewed and the patient has been instructed to contact this office if they have any questions or concerns.  This plan of care has been discussed with the patient and the patient is in agreement.     Frequency / Duration:  Patient will be seen for a total of 4 visits; 4-6 weeks    Total Visit Time: 45  minutes            Carmen Chamorro, PT                                      Date:  5/10/2019      =====================================================  **  Referring Provider Certification: Referring Provider reviewing certifies that the above  treatment / plan of care is required and authorized, and that the patient's plan will be reviewed every thirty (30) days **.   ======================================================     PRESENT:  NA    MULTIDISCIPLINARY PATIENT / FAMILY EDUCATION RECORD  Department:  Physical Therapy    Readiness to Learn: Ability to understand verbal instructions, Ability to understand written instructions, Knowledge of educational needs / treatment plan  Specific Barriers to Learning: None  Referrals: None  Learning Needs: Rehabilitation techniques to improve functional independence Pain management education to improve daily activity tolerance.  Who: Patient  How: Demonstration, Verbal instructions, Written instructions  Response: Appropriate verbal response, Asked questions, Demonstrated ability, Verbalized recall / understanding

## 2019-05-14 DIAGNOSIS — I25.119 CORONARY ARTERY DISEASE INVOLVING NATIVE HEART WITH ANGINA PECTORIS, UNSPECIFIED VESSEL OR LESION TYPE (H): ICD-10-CM

## 2019-05-14 NOTE — TELEPHONE ENCOUNTER
dulaglutide (TRULICITY) 1.5 MG/0.5ML pen (Discontinued)  Last Written Prescription Date:  1/14/19- 5/7/19  Last Fill Quantity: 2mL,  # refills: 3   Last office visit: 5/7/2019 with prescribing provider:     Future Office Visit:   Next 5 appointments (look out 90 days)    May 15, 2019 12:30 PM CDT  (Arrive by 12:15 PM)  Office Visit with Lauren Turner Bloch, RPH  [facility] ([facility]) [address] [facility] [id number] [phone]   May 17, 2019  9:30 AM CDT  Return Visit with Carmen Chamorro PT  [facility] ([facility]) [address] [id number] [phone]   May 21, 2019 12:00 PM CDT  Return Visit with Eladio Kwong MD  [facility] ([facility]) [address] [phone]   Jun 04, 2019  2:00 PM CDT  [facility] [address] [place] [phone]   Jun 04, 2019  2:00 PM CDT  SHORT with Eugenia De Jesus RPH  [facility] ([facility] [address] [place] [phone]         Requested Prescriptions   Pending Prescriptions Disp Refills     metoprolol succinate ER (TOPROL-XL) 25 MG 24 hr tablet  Last Written Prescription Date:  11/27/18  Last Fill Quantity: 90,  # refills: 1   Last office visit: 5/7/2019 with prescribing provider:     Future Office Visit:   Next 5 appointments (look out 90 days)    May 15, 2019 12:30 PM CDT  (Arrive by 12:15 PM)  Office Visit with Lauren Turner Bloch, RPH [facility] ([facility]) [address] [facility] [phone]   May 17, 2019  9:30 AM CDT  Return  "Visit with Carmen Chamorro PT  [facility] ([facility]) [address]  [address]  [facility] [address]  [phone]   May 21, 2019 12:00 PM CDT  Return Visit with Eladio Kwong MD  [facility] ([facility]) [address] [phone]   Jun 04, 2019  2:00 PM CDT  Return Visit with ART Arias CNP  [facility] ([facility]) [address] [place] [address]  [facility] [phone]   Jun 04, 2019  2:00 PM CDT  SHORT with Eugenia De Jesus [facility] ([facility]) [address] [place] [address]  [facility] [phone]          90 tablet 1     Sig: Take 1 tablet (25 mg) by mouth daily       Beta-Blockers Protocol Passed - 5/14/2019  4:27 PM        Passed - Blood pressure under 140/90 in past 12 months     BP Readings from Last 3 Encounters:   05/07/19 110/64   04/30/19 102/60   04/15/19 127/69                 Passed - Patient is age 6 or older        Passed - Recent (12 mo) or future (30 days) visit within the authorizing provider's specialty     Patient had office visit in the last 12 months or has a visit in the next 30 days with authorizing provider or within the authorizing provider's specialty.  See \"Patient Info\" tab in inbasket, or \"Choose Columns\" in Meds & Orders section of the refill encounter.              Passed - Medication is active on med list          "

## 2019-05-15 ENCOUNTER — OFFICE VISIT (OUTPATIENT)
Dept: PHARMACY | Facility: CLINIC | Age: 59
End: 2019-05-15
Payer: COMMERCIAL

## 2019-05-15 ENCOUNTER — APPOINTMENT (OUTPATIENT)
Dept: CT IMAGING | Facility: CLINIC | Age: 59
End: 2019-05-15
Attending: PHYSICIAN ASSISTANT
Payer: MEDICARE

## 2019-05-15 ENCOUNTER — APPOINTMENT (OUTPATIENT)
Dept: GENERAL RADIOLOGY | Facility: CLINIC | Age: 59
End: 2019-05-15
Attending: PHYSICIAN ASSISTANT
Payer: MEDICARE

## 2019-05-15 ENCOUNTER — OFFICE VISIT (OUTPATIENT)
Dept: SURGERY | Facility: CLINIC | Age: 59
End: 2019-05-15
Payer: MEDICARE

## 2019-05-15 ENCOUNTER — HOSPITAL ENCOUNTER (OUTPATIENT)
Facility: CLINIC | Age: 59
Setting detail: OBSERVATION
Discharge: HOME | End: 2019-05-16
Attending: INTERNAL MEDICINE | Admitting: INTERNAL MEDICINE
Payer: MEDICARE

## 2019-05-15 VITALS
HEART RATE: 70 BPM | DIASTOLIC BLOOD PRESSURE: 60 MMHG | WEIGHT: 236.8 LBS | SYSTOLIC BLOOD PRESSURE: 108 MMHG | BODY MASS INDEX: 46.25 KG/M2

## 2019-05-15 VITALS — BODY MASS INDEX: 46.25 KG/M2 | WEIGHT: 236.8 LBS

## 2019-05-15 DIAGNOSIS — E86.0 DEHYDRATION: ICD-10-CM

## 2019-05-15 DIAGNOSIS — Z79.4 TYPE 2 DIABETES MELLITUS WITH HYPERGLYCEMIA, WITH LONG-TERM CURRENT USE OF INSULIN (H): Primary | ICD-10-CM

## 2019-05-15 DIAGNOSIS — R73.9 HYPERGLYCEMIA: ICD-10-CM

## 2019-05-15 DIAGNOSIS — Z79.4 TYPE 2 DIABETES MELLITUS WITH MILD NONPROLIFERATIVE RETINOPATHY WITHOUT MACULAR EDEMA, WITH LONG-TERM CURRENT USE OF INSULIN, UNSPECIFIED LATERALITY (H): ICD-10-CM

## 2019-05-15 DIAGNOSIS — E11.3299 TYPE 2 DIABETES MELLITUS WITH MILD NONPROLIFERATIVE RETINOPATHY WITHOUT MACULAR EDEMA, WITH LONG-TERM CURRENT USE OF INSULIN, UNSPECIFIED LATERALITY (H): ICD-10-CM

## 2019-05-15 DIAGNOSIS — E11.65 TYPE 2 DIABETES MELLITUS WITH HYPERGLYCEMIA, WITH LONG-TERM CURRENT USE OF INSULIN (H): Primary | ICD-10-CM

## 2019-05-15 LAB
ALBUMIN SERPL-MCNC: 3.2 G/DL (ref 3.4–5)
ALBUMIN UR-MCNC: NEGATIVE MG/DL
ALP SERPL-CCNC: 86 U/L (ref 40–150)
ALT SERPL W P-5'-P-CCNC: 33 U/L (ref 0–50)
ANION GAP SERPL CALCULATED.3IONS-SCNC: 11 MMOL/L (ref 6–17)
ANION GAP SERPL CALCULATED.3IONS-SCNC: 7 MMOL/L (ref 3–14)
APPEARANCE UR: CLEAR
AST SERPL W P-5'-P-CCNC: 22 U/L (ref 0–45)
BASOPHILS # BLD AUTO: 0 10E9/L (ref 0–0.2)
BASOPHILS NFR BLD AUTO: 0.5 %
BILIRUB SERPL-MCNC: <0.1 MG/DL (ref 0.2–1.3)
BILIRUB UR QL STRIP: NEGATIVE
BUN SERPL-MCNC: 17 MG/DL (ref 7–30)
BUN SERPL-MCNC: 18 MG/DL (ref 7–30)
CA-I BLD-SCNC: 4.7 MG/DL (ref 4.4–5.2)
CALCIUM SERPL-MCNC: 8.5 MG/DL (ref 8.5–10.1)
CHLORIDE BLD-SCNC: 105 MMOL/L (ref 94–109)
CHLORIDE SERPL-SCNC: 107 MMOL/L (ref 94–109)
CO2 BLD-SCNC: 21 MMOL/L (ref 20–32)
CO2 BLDCOV-SCNC: 20 MMOL/L (ref 21–28)
CO2 SERPL-SCNC: 23 MMOL/L (ref 20–32)
COLOR UR AUTO: ABNORMAL
CREAT BLD-MCNC: 0.9 MG/DL (ref 0.52–1.04)
CREAT SERPL-MCNC: 0.93 MG/DL (ref 0.52–1.04)
DIFFERENTIAL METHOD BLD: ABNORMAL
EOSINOPHIL # BLD AUTO: 0.1 10E9/L (ref 0–0.7)
EOSINOPHIL NFR BLD AUTO: 0.8 %
ERYTHROCYTE [DISTWIDTH] IN BLOOD BY AUTOMATED COUNT: 13.2 % (ref 10–15)
GFR SERPL CREATININE-BSD FRML MDRD: 64 ML/MIN/{1.73_M2}
GFR SERPL CREATININE-BSD FRML MDRD: 67 ML/MIN/{1.73_M2}
GLUCOSE BLD-MCNC: 380 MG/DL (ref 70–99)
GLUCOSE BLDC GLUCOMTR-MCNC: 189 MG/DL (ref 70–99)
GLUCOSE BLDC GLUCOMTR-MCNC: 256 MG/DL (ref 70–99)
GLUCOSE SERPL-MCNC: 370 MG/DL (ref 70–99)
GLUCOSE UR STRIP-MCNC: >1000 MG/DL
HBA1C MFR BLD: 7.6 % (ref 0–5.6)
HCG UR QL: NEGATIVE
HCT VFR BLD AUTO: 32.5 % (ref 35–47)
HCT VFR BLD CALC: 32 %PCV (ref 35–47)
HGB BLD CALC-MCNC: 10.9 G/DL (ref 11.7–15.7)
HGB BLD-MCNC: 10.3 G/DL (ref 11.7–15.7)
HGB UR QL STRIP: NEGATIVE
IMM GRANULOCYTES # BLD: 0 10E9/L (ref 0–0.4)
IMM GRANULOCYTES NFR BLD: 0.5 %
KETONES BLD-SCNC: 0.1 MMOL/L (ref 0–0.6)
KETONES UR STRIP-MCNC: NEGATIVE MG/DL
LACTATE BLD-SCNC: 2.9 MMOL/L (ref 0.7–2.1)
LACTATE SERPL-SCNC: 2.2 MMOL/L (ref 0.4–2)
LEUKOCYTE ESTERASE UR QL STRIP: NEGATIVE
LYMPHOCYTES # BLD AUTO: 1.7 10E9/L (ref 0.8–5.3)
LYMPHOCYTES NFR BLD AUTO: 22.2 %
MCH RBC QN AUTO: 31.8 PG (ref 26.5–33)
MCHC RBC AUTO-ENTMCNC: 31.7 G/DL (ref 31.5–36.5)
MCV RBC AUTO: 100 FL (ref 78–100)
MONOCYTES # BLD AUTO: 0.6 10E9/L (ref 0–1.3)
MONOCYTES NFR BLD AUTO: 7.3 %
NEUTROPHILS # BLD AUTO: 5.1 10E9/L (ref 1.6–8.3)
NEUTROPHILS NFR BLD AUTO: 68.7 %
NITRATE UR QL: NEGATIVE
NRBC # BLD AUTO: 0 10*3/UL
NRBC BLD AUTO-RTO: 0 /100
NT-PROBNP SERPL-MCNC: 25 PG/ML (ref 0–900)
OSMOLALITY SERPL: 303 MMOL/KG (ref 275–295)
PCO2 BLDV: 44 MM HG (ref 40–50)
PH BLDV: 7.26 PH (ref 7.32–7.43)
PH UR STRIP: 7 PH (ref 5–7)
PLATELET # BLD AUTO: 158 10E9/L (ref 150–450)
PO2 BLDV: 45 MM HG (ref 25–47)
POTASSIUM BLD-SCNC: 4.2 MMOL/L (ref 3.4–5.3)
POTASSIUM SERPL-SCNC: 4.3 MMOL/L (ref 3.4–5.3)
PROT SERPL-MCNC: 6.9 G/DL (ref 6.8–8.8)
RBC # BLD AUTO: 3.24 10E12/L (ref 3.8–5.2)
RBC #/AREA URNS AUTO: <1 /HPF (ref 0–2)
SAO2 % BLDV FROM PO2: 74 %
SODIUM BLD-SCNC: 137 MMOL/L (ref 133–144)
SODIUM SERPL-SCNC: 137 MMOL/L (ref 133–144)
SOURCE: ABNORMAL
SP GR UR STRIP: 1.02 (ref 1–1.03)
SQUAMOUS #/AREA URNS AUTO: 1 /HPF (ref 0–1)
TROPONIN I SERPL-MCNC: <0.015 UG/L (ref 0–0.04)
UROBILINOGEN UR STRIP-MCNC: NORMAL MG/DL (ref 0–2)
WBC # BLD AUTO: 7.5 10E9/L (ref 4–11)
WBC #/AREA URNS AUTO: 5 /HPF (ref 0–5)

## 2019-05-15 PROCEDURE — 83605 ASSAY OF LACTIC ACID: CPT | Performed by: PHYSICIAN ASSISTANT

## 2019-05-15 PROCEDURE — 82803 BLOOD GASES ANY COMBINATION: CPT

## 2019-05-15 PROCEDURE — 83605 ASSAY OF LACTIC ACID: CPT

## 2019-05-15 PROCEDURE — 99607 MTMS BY PHARM ADDL 15 MIN: CPT | Performed by: PHARMACIST

## 2019-05-15 PROCEDURE — 84484 ASSAY OF TROPONIN QUANT: CPT | Performed by: PHYSICIAN ASSISTANT

## 2019-05-15 PROCEDURE — 80053 COMPREHEN METABOLIC PANEL: CPT | Performed by: PHYSICIAN ASSISTANT

## 2019-05-15 PROCEDURE — 70450 CT HEAD/BRAIN W/O DYE: CPT

## 2019-05-15 PROCEDURE — 99219 ZZC INITIAL OBSERVATION CARE,LEVL II: CPT | Performed by: INTERNAL MEDICINE

## 2019-05-15 PROCEDURE — 25000128 H RX IP 250 OP 636: Performed by: PHYSICIAN ASSISTANT

## 2019-05-15 PROCEDURE — 99285 EMERGENCY DEPT VISIT HI MDM: CPT | Mod: 25

## 2019-05-15 PROCEDURE — 85025 COMPLETE CBC W/AUTO DIFF WBC: CPT | Performed by: PHYSICIAN ASSISTANT

## 2019-05-15 PROCEDURE — 80047 BASIC METABLC PNL IONIZED CA: CPT

## 2019-05-15 PROCEDURE — 36415 COLL VENOUS BLD VENIPUNCTURE: CPT | Performed by: PHYSICIAN ASSISTANT

## 2019-05-15 PROCEDURE — 25800030 ZZH RX IP 258 OP 636: Performed by: PHYSICIAN ASSISTANT

## 2019-05-15 PROCEDURE — 96361 HYDRATE IV INFUSION ADD-ON: CPT

## 2019-05-15 PROCEDURE — 83880 ASSAY OF NATRIURETIC PEPTIDE: CPT | Performed by: PHYSICIAN ASSISTANT

## 2019-05-15 PROCEDURE — 81001 URINALYSIS AUTO W/SCOPE: CPT | Performed by: PHYSICIAN ASSISTANT

## 2019-05-15 PROCEDURE — 96374 THER/PROPH/DIAG INJ IV PUSH: CPT

## 2019-05-15 PROCEDURE — 81025 URINE PREGNANCY TEST: CPT | Performed by: PHYSICIAN ASSISTANT

## 2019-05-15 PROCEDURE — 83930 ASSAY OF BLOOD OSMOLALITY: CPT | Performed by: PHYSICIAN ASSISTANT

## 2019-05-15 PROCEDURE — 83036 HEMOGLOBIN GLYCOSYLATED A1C: CPT | Performed by: PHYSICIAN ASSISTANT

## 2019-05-15 PROCEDURE — 00000146 ZZHCL STATISTIC GLUCOSE BY METER IP

## 2019-05-15 PROCEDURE — 71046 X-RAY EXAM CHEST 2 VIEWS: CPT

## 2019-05-15 PROCEDURE — 82010 KETONE BODYS QUAN: CPT | Performed by: PHYSICIAN ASSISTANT

## 2019-05-15 PROCEDURE — 93005 ELECTROCARDIOGRAM TRACING: CPT

## 2019-05-15 PROCEDURE — 85014 HEMATOCRIT: CPT | Mod: 91

## 2019-05-15 PROCEDURE — 83605 ASSAY OF LACTIC ACID: CPT | Mod: 91

## 2019-05-15 PROCEDURE — 25800030 ZZH RX IP 258 OP 636: Performed by: INTERNAL MEDICINE

## 2019-05-15 PROCEDURE — G0378 HOSPITAL OBSERVATION PER HR: HCPCS

## 2019-05-15 PROCEDURE — 99606 MTMS BY PHARM EST 15 MIN: CPT | Performed by: PHARMACIST

## 2019-05-15 RX ORDER — ONDANSETRON 2 MG/ML
4 INJECTION INTRAMUSCULAR; INTRAVENOUS EVERY 30 MIN PRN
Status: DISCONTINUED | OUTPATIENT
Start: 2019-05-15 | End: 2019-05-15

## 2019-05-15 RX ORDER — LOSARTAN POTASSIUM 50 MG/1
50 TABLET ORAL DAILY
Status: DISCONTINUED | OUTPATIENT
Start: 2019-05-16 | End: 2019-05-16 | Stop reason: HOSPADM

## 2019-05-15 RX ORDER — SERTRALINE HYDROCHLORIDE 100 MG/1
200 TABLET, FILM COATED ORAL DAILY
Status: DISCONTINUED | OUTPATIENT
Start: 2019-05-16 | End: 2019-05-16 | Stop reason: HOSPADM

## 2019-05-15 RX ORDER — BENZTROPINE MESYLATE 1 MG/1
1 TABLET ORAL 2 TIMES DAILY
Status: DISCONTINUED | OUTPATIENT
Start: 2019-05-15 | End: 2019-05-16 | Stop reason: HOSPADM

## 2019-05-15 RX ORDER — POTASSIUM CHLORIDE 29.8 MG/ML
20 INJECTION INTRAVENOUS
Status: DISCONTINUED | OUTPATIENT
Start: 2019-05-15 | End: 2019-05-16 | Stop reason: HOSPADM

## 2019-05-15 RX ORDER — POTASSIUM CHLORIDE 1500 MG/1
20-40 TABLET, EXTENDED RELEASE ORAL
Status: DISCONTINUED | OUTPATIENT
Start: 2019-05-15 | End: 2019-05-16 | Stop reason: HOSPADM

## 2019-05-15 RX ORDER — ACETAMINOPHEN 500 MG
1000 TABLET ORAL 3 TIMES DAILY PRN
Status: DISCONTINUED | OUTPATIENT
Start: 2019-05-15 | End: 2019-05-16 | Stop reason: HOSPADM

## 2019-05-15 RX ORDER — POLYETHYLENE GLYCOL 3350 17 G/17G
9 POWDER, FOR SOLUTION ORAL EVERY OTHER DAY
Status: DISCONTINUED | OUTPATIENT
Start: 2019-05-17 | End: 2019-05-16 | Stop reason: HOSPADM

## 2019-05-15 RX ORDER — POTASSIUM CL/LIDO/0.9 % NACL 10MEQ/0.1L
10 INTRAVENOUS SOLUTION, PIGGYBACK (ML) INTRAVENOUS
Status: DISCONTINUED | OUTPATIENT
Start: 2019-05-15 | End: 2019-05-16 | Stop reason: HOSPADM

## 2019-05-15 RX ORDER — ONDANSETRON 4 MG/1
4 TABLET, ORALLY DISINTEGRATING ORAL EVERY 6 HOURS PRN
Status: DISCONTINUED | OUTPATIENT
Start: 2019-05-15 | End: 2019-05-16 | Stop reason: HOSPADM

## 2019-05-15 RX ORDER — GABAPENTIN 300 MG/1
900 CAPSULE ORAL AT BEDTIME
COMMUNITY
End: 2019-07-02 | Stop reason: DRUGHIGH

## 2019-05-15 RX ORDER — POTASSIUM CHLORIDE 7.45 MG/ML
10 INJECTION INTRAVENOUS
Status: DISCONTINUED | OUTPATIENT
Start: 2019-05-15 | End: 2019-05-16 | Stop reason: HOSPADM

## 2019-05-15 RX ORDER — ALBUTEROL SULFATE 90 UG/1
2 AEROSOL, METERED RESPIRATORY (INHALATION) EVERY 6 HOURS PRN
Status: DISCONTINUED | OUTPATIENT
Start: 2019-05-15 | End: 2019-05-16 | Stop reason: HOSPADM

## 2019-05-15 RX ORDER — PREDNISONE 5 MG/1
5 TABLET ORAL DAILY
Status: DISCONTINUED | OUTPATIENT
Start: 2019-05-16 | End: 2019-05-16 | Stop reason: HOSPADM

## 2019-05-15 RX ORDER — DEXTROSE MONOHYDRATE 25 G/50ML
25-50 INJECTION, SOLUTION INTRAVENOUS
Status: DISCONTINUED | OUTPATIENT
Start: 2019-05-15 | End: 2019-05-16 | Stop reason: HOSPADM

## 2019-05-15 RX ORDER — PROCHLORPERAZINE MALEATE 5 MG
10 TABLET ORAL EVERY 6 HOURS PRN
Status: DISCONTINUED | OUTPATIENT
Start: 2019-05-15 | End: 2019-05-16 | Stop reason: HOSPADM

## 2019-05-15 RX ORDER — GABAPENTIN 300 MG/1
600 CAPSULE ORAL 2 TIMES DAILY
COMMUNITY
End: 2019-06-12

## 2019-05-15 RX ORDER — SUMATRIPTAN 25 MG/1
25-50 TABLET, FILM COATED ORAL
Status: DISCONTINUED | OUTPATIENT
Start: 2019-05-16 | End: 2019-05-16 | Stop reason: HOSPADM

## 2019-05-15 RX ORDER — POTASSIUM CHLORIDE 1.5 G/1.58G
20-40 POWDER, FOR SOLUTION ORAL
Status: DISCONTINUED | OUTPATIENT
Start: 2019-05-15 | End: 2019-05-16 | Stop reason: HOSPADM

## 2019-05-15 RX ORDER — PREDNISONE 5 MG/1
5 TABLET ORAL DAILY
Status: ON HOLD | COMMUNITY
End: 2019-05-16

## 2019-05-15 RX ORDER — MAGNESIUM SULFATE HEPTAHYDRATE 40 MG/ML
4 INJECTION, SOLUTION INTRAVENOUS EVERY 4 HOURS PRN
Status: DISCONTINUED | OUTPATIENT
Start: 2019-05-15 | End: 2019-05-16 | Stop reason: HOSPADM

## 2019-05-15 RX ORDER — PROCHLORPERAZINE 25 MG
25 SUPPOSITORY, RECTAL RECTAL EVERY 12 HOURS PRN
Status: DISCONTINUED | OUTPATIENT
Start: 2019-05-15 | End: 2019-05-16 | Stop reason: HOSPADM

## 2019-05-15 RX ORDER — ASPIRIN 81 MG/1
81 TABLET ORAL DAILY
Status: DISCONTINUED | OUTPATIENT
Start: 2019-05-16 | End: 2019-05-16 | Stop reason: HOSPADM

## 2019-05-15 RX ORDER — NALOXONE HYDROCHLORIDE 0.4 MG/ML
.1-.4 INJECTION, SOLUTION INTRAMUSCULAR; INTRAVENOUS; SUBCUTANEOUS
Status: DISCONTINUED | OUTPATIENT
Start: 2019-05-15 | End: 2019-05-16 | Stop reason: HOSPADM

## 2019-05-15 RX ORDER — ONDANSETRON 2 MG/ML
4 INJECTION INTRAMUSCULAR; INTRAVENOUS EVERY 6 HOURS PRN
Status: DISCONTINUED | OUTPATIENT
Start: 2019-05-15 | End: 2019-05-16 | Stop reason: HOSPADM

## 2019-05-15 RX ORDER — SODIUM CHLORIDE 9 MG/ML
INJECTION, SOLUTION INTRAVENOUS CONTINUOUS
Status: DISCONTINUED | OUTPATIENT
Start: 2019-05-15 | End: 2019-05-16 | Stop reason: HOSPADM

## 2019-05-15 RX ORDER — METOPROLOL SUCCINATE 25 MG/1
25 TABLET, EXTENDED RELEASE ORAL DAILY
Qty: 90 TABLET | Refills: 1 | Status: SHIPPED | OUTPATIENT
Start: 2019-05-15 | End: 2019-10-21

## 2019-05-15 RX ORDER — AMOXICILLIN 250 MG
1 CAPSULE ORAL 2 TIMES DAILY PRN
Status: DISCONTINUED | OUTPATIENT
Start: 2019-05-15 | End: 2019-05-16 | Stop reason: HOSPADM

## 2019-05-15 RX ORDER — IPRATROPIUM BROMIDE AND ALBUTEROL SULFATE 2.5; .5 MG/3ML; MG/3ML
1 SOLUTION RESPIRATORY (INHALATION) EVERY 6 HOURS PRN
Status: DISCONTINUED | OUTPATIENT
Start: 2019-05-15 | End: 2019-05-16 | Stop reason: HOSPADM

## 2019-05-15 RX ORDER — NICOTINE POLACRILEX 4 MG
15-30 LOZENGE BUCCAL
Status: DISCONTINUED | OUTPATIENT
Start: 2019-05-15 | End: 2019-05-16 | Stop reason: HOSPADM

## 2019-05-15 RX ORDER — ATORVASTATIN CALCIUM 40 MG/1
80 TABLET, FILM COATED ORAL DAILY
Status: DISCONTINUED | OUTPATIENT
Start: 2019-05-16 | End: 2019-05-16 | Stop reason: HOSPADM

## 2019-05-15 RX ORDER — METOPROLOL SUCCINATE 25 MG/1
25 TABLET, EXTENDED RELEASE ORAL DAILY
Status: DISCONTINUED | OUTPATIENT
Start: 2019-05-16 | End: 2019-05-16 | Stop reason: HOSPADM

## 2019-05-15 RX ADMIN — SODIUM CHLORIDE: 9 INJECTION, SOLUTION INTRAVENOUS at 23:23

## 2019-05-15 RX ADMIN — SODIUM CHLORIDE, POTASSIUM CHLORIDE, SODIUM LACTATE AND CALCIUM CHLORIDE 1000 ML: 600; 310; 30; 20 INJECTION, SOLUTION INTRAVENOUS at 18:30

## 2019-05-15 RX ADMIN — SODIUM CHLORIDE, POTASSIUM CHLORIDE, SODIUM LACTATE AND CALCIUM CHLORIDE 1000 ML: 600; 310; 30; 20 INJECTION, SOLUTION INTRAVENOUS at 20:57

## 2019-05-15 RX ADMIN — ONDANSETRON 4 MG: 2 INJECTION INTRAMUSCULAR; INTRAVENOUS at 18:31

## 2019-05-15 ASSESSMENT — ENCOUNTER SYMPTOMS
COUGH: 1
FEVER: 0
NAUSEA: 0
HEADACHES: 0
SHORTNESS OF BREATH: 0
DIZZINESS: 1
POLYDIPSIA: 1
VOMITING: 0

## 2019-05-15 ASSESSMENT — MIFFLIN-ST. JEOR: SCORE: 1601.02

## 2019-05-15 NOTE — ED PROVIDER NOTES
History     Chief Complaint:  Hyperglycemia    HPI   Nena Tang is a 59 year old female with a history of hypertension, hyperlipidemia, CAD, schizoaffective disorder, and type 2 diabetes, on insulin, who presents to the ED via EMS for evaluation of hyperglycemia. The patient began experiencing dizziness, blurred vision, and polydipsia earlier this afternoon. When she checked her blood sugar at home it was 500 so she called EMS. On EMS arrival, blood sugar was 422 and paramedics started patient on IV fluids. Upon arrival in the garage, blood sugar was 418. Here, patient also endorses having a cough and some shoulder soreness. She denies any nausea, vomiting, fevers, headache, shortness of breath, leg swelling, any other symptoms. Patient denies any diet changes lately and has been taking her medications as prescribed. She has been hospitalized before for hyperglycemia.  Patient is on both long acting insulin Lantus and short acting sliding scale.    Allergies:  Imidazole antifungals  Ketoprofen  Lisinopril  Metformin  Metronidazole  Posaconazole     Medications:    albuterol (PROAIR HFA/PROVENTIL HFA/VENTOLIN HFA) 108 (90 Base) MCG/ACT Inhaler  aspirin (ASA) 81 MG EC tablet  atorvastatin (LIPITOR) 80 MG tablet  benztropine (COGENTIN) 0.5 MG tablet  calcium carbonate (OS-ALLEN) 1500 (600 Ca) MG tablet  cholecalciferol (VITAMIN D3) 06677 units (1250 mcg) capsule  diclofenac (VOLTAREN) 1 % topical gel  gabapentin (NEURONTIN) 300 MG capsule  glucose 4 G CHEW chewable tablet  guaiFENesin (ROBITUSSIN) 100 MG/5ML liquid  ipratropium - albuterol 0.5 mg/2.5 mg/3 mL (DUONEB) 0.5-2.5 (3) MG/3ML neb solution  leflunomide (ARAVA) 20 MG tablet  losartan (COZAAR) 50 MG tablet  melatonin 5 MG CAPS  metoprolol succinate ER (TOPROL-XL) 25 MG 24 hr tablet  paliperidone ER (INVEGA) 9 MG 24 hr tablet  polyethylene glycol (MIRALAX/GLYCOLAX) powder  predniSONE (DELTASONE) 5 MG tablet  ranitidine (ZANTAC) 300 MG tablet  Semaglutide  (OZEMPIC) 0.25 or 0.5 MG/DOSE SOPN  senna-docusate (SENOKOT-S;PERICOLACE) 8.6-50 MG per tablet  sertraline (ZOLOFT) 100 MG tablet  SUMAtriptan (IMITREX) 25 MG tablet  topiramate (TOPAMAX) 50 MG tablet     Past Medical History:    Acute respiratory failure with hypoxia  CAD  Chronic low back pain  Cocaine abuse, in remission  Fecal urgency   Heroin abuse  Hyperlipidemia  Hypertension  Illiterate  IBS  Left cataract  Migraine  Moderate major depression  Noncompliance with medication regimen  Obesity   CINDY  Osteopenia  Pneumonia of right lower lobe due to infectious organism  Schizoaffective disorder, depressive type  Sepsis  Suicidal intent   Takotsubo cardiomyopathy  Type 2 diabetes mellitus  Uterine cancer  Verbal auditory hallucinations   Vitamin B12 deficiency   RLS  PTSD  Infectious encephalopathy   Psychophysiological insomnia  Falls frequently   Alcohol use  Tardive dyskinesia   Mild nonproliferative diabetic retinopathy   Esophageal reflux  Lumbago  Cervicalgia    Past Surgical History:    Oophorectomy   Cataract, bilateral  Hysterectomy   Laparoscopic cholecystectomy  Phacoemulsification clear cornea with standard intraocular lens implant, bilateral  Release trigger finger, right x2    Family History:    Bladder cancer  COPD  Cirrhosis of liver  Alcohol/drug  Diabetes  Hypertension  Lipids  CAD  Glaucoma  Mental illness  Colorectal cancer    Social History:  Smoking status: Never  Alcohol use: Yes  Marital Status:       Review of Systems   Constitutional: Negative for fever.   Respiratory: Positive for cough. Negative for shortness of breath.    Cardiovascular: Positive for chest pain (soreness).   Gastrointestinal: Negative for nausea and vomiting.   Endocrine: Positive for polydipsia.   Neurological: Positive for dizziness. Negative for headaches.   All other systems reviewed and are negative.    Physical Exam     Patient Vitals for the past 24 hrs:   BP Temp Pulse Heart Rate Resp SpO2 Weight  "  05/15/19 2020 141/73 -- 90 -- -- -- --   05/15/19 2015 140/62 -- 85 -- -- 93 % --   05/15/19 1830 157/71 -- 90 90 13 97 % --   05/15/19 1816 176/72 -- -- -- -- -- --   05/15/19 1814 -- 97.9  F (36.6  C) 97 97 20 97 % 108 kg (238 lb)     Orthostatics:   Lying: \"dizzy\" 140/62; 85  Sitting: \"dizzy more\" 150/75; 87  Standing: \"more dizzy\" 141/73; 90    Physical Exam  General: Alert and cooperative with exam. Resting comfortably on gurney  Head:  Scalp is NC/AT  Eyes:  No scleral icterus, PERRL  ENT:  The external nose and ears are normal. Mucus membranes are dry.  Neck:  Normal range of motion without rigidity.  CV:  Regular rate and rhythm    No pathologic murmur, rubs, or gallops.  Resp:  Breath sounds are clear bilaterally.  No crackles, wheezes, rhonchi.    Non-labored, no retractions or accessory muscle use  GI:  Abdomen is soft, no distension, no tenderness, no masses. No peritoneal signs.  Bowel sounds present in all quadrants  :  No suprapubic or flank tenderness  MS:  No lower extremity edema or asymmetric calf swelling.  Skin:  Warm and dry, No rash or lesions noted.  Neuro: Oriented. No gross motor deficits.    Strength and sensation grossly intact in all 4 extremities.  Cranial nerves 2-12 intact. GCS: 15. Normal finger to nose testing.  Psych: Awake. Alert. Normal affect. Appropriate interactions.      Emergency Department Course   ECG (18:23:40):  Rate 88 bpm. WA interval 170. QRS duration 80. QT/QTc 358/433. P-R-T axes 16 -48 2. Sinus rhythm with occasional premature ventricular complexes. Left axis deviation. Septal infarct, age undetermined. Abnormal ECG. Interpreted at 1823 by Nikolai Sierra PA-C.    Imaging:  Radiographic findings were communicated with the patient who voiced understanding of the findings.    CT-scan Head w/o contrast:  IMPRESSION: No evidence of acute intracranial hemorrhage, mass, or  herniation.  Result per radiology.     X-ray Chest, 2 views:  IMPRESSION: No acute " disease.  Result per radiology.     Laboratory:  UA: GLC >1000, o/w Negative  HCG qual: Negative   - ISTAT Basic Met ICa HCT POCT: Glucose 380 (H), HGB 10.9 (L), Hematocrit 32 (L) o/w WNL (Creatinine 0.9)   - ISTAT Lactate: pH 7.26 (L), pCO2 44, pO2 45, Bicarbonate 20 (L), Lactic Acid 2.9 (H)   - Lactic Acid: 2.2 (H)  CBC: HGB 10.3 (L) o/w WNL (WBC 7.5, )  CMP: Glucose 370 (H), Bilirubin total <0.1 (L), Albumin 3.2 (L) o/w WNL (Creatinine 0.93)   - Troponin: <0.015  Ketone Quant: 0.1  Osmolality: Pending  Hemoglobin A1c: 7.6 (H)  BNP: 25   - Glucose: 256 (H)    Interventions:  1830: Lactated ringers bolus 1L IV  : Zofran 4mg IV injection   : Lactated ringers bolus 1L IV  : NS 1L IV Bolus    Emergency Department Course:  The patient arrived in the emergency department via EMS.  Past medical records, nursing notes, and vitals reviewed.  : I performed an exam of the patient and obtained history, as documented above. GCS 15.    IV inserted and blood drawn.    The patient was sent for a xray and CT while in the emergency department, findings above.    Findings and plan explained to the Patient who consents to admission.     : Discussed the patient with Dr. Quiles, who will admit the patient to an observation bed for further monitoring, evaluation, and treatment.     Impression & Plan      Medical Decision Makin-year-old female who presents with hyperglycemia, blurred vision, dizziness.  On examination, the patient is mildly tired appearing but otherwise well-appearing with stable vitals.  Her glucose was initially over 400 on arrival, however following fluids it is now decreased to 270.  There is no evidence of diabetic ketoacidosis or HHS at this time.  Her electrolytes are stable.    Her lactic acid was initially elevated to 2.9, improved to 2.2 following fluids.  There is no evidence of sepsis or septic shock at this time and I strongly suspect this is secondary to  dehydration.     It is somewhat unclear what triggered this episode of hyperglycemia as she does report compliance with her medications, and hemoglobin A1C is relatively unimpressive at 7.6.  She does report a cough though chest x-ray is negative for pneumonia.  EKG and troponin are not suggestive of ischemic event or arrhythmia.  She does not have any other signs of infection at this time and is afebrile.  CT of the head is negative for bleed or other acute pathology.  The patient continues to feel very weak, dizzy, and dehydrated and is not comfortable going home at this time.  I spoke with Dr. Quiles, who will admit the patient observation.  The patient consents to admission all questions were answered.  Diagnosis:    ICD-10-CM    1. Hyperglycemia R73.9 Glucose by meter     Glucose by meter   2. Dehydration E86.0        Disposition:  Admitted to observation.      Ora Heart  5/15/2019   [facility] EMERGENCY DEPARTMENT  I, Ora Heart, am serving as a scribe at 6:11 PM on 5/15/2019 to document services personally performed by Nikolai Sierra PA-C based on my observations and the provider's statements to me.        Nikolai Sierra PA-C  05/29/19 [id number]

## 2019-05-15 NOTE — PROGRESS NOTES
"Nena Tang is a 59 year old female presents today for follow up weight management nutrition consultation.  Patient referred by Debbie PRECIADO.    Patient has Medicare and history of diabetes, ABN signed 4/15/19-4/15/2020.    Estimated body mass index is 46.74 kg/m  as calculated from the following:    Height as of an earlier encounter on 4/15/19: 1.524 m (5').    Weight as of an earlier encounter on 4/15/19: 108.5 kg (239 lb 4.8 oz).  Current weight: 236.8 lbs (-2.5 lbs in the past month)       Nutrition history  See MD note for details.  Lives in a group home  Lactose intolerant - unable to drink milk on its own can have some ice cream    Recent food recall:  Breakfast: skips or will have cereal(couple times per week) tries not to eat breakfast because she does have an appetite  Lunch: noodles or chicken tenders(with ranch) or chicken sandwich with fries and a drink(diet)  Dinner: eats whatever is prepared - cheese and \"something else\" last night or PB toast  Beverages: one cup of coffee, diet pop (one bottle only at the drop in center)   Dinning out: three times per week - [facility], [employer] or [employer](sundays)    Progress with previous goals:  1) Reduce calorie containing beverages - cut out coffee at drop in center one cup of coffee    2) Reduce diet pepsi by 50%, drink one bottle per day met   3) Limit to one sweet treat from son per month decreased sweets to once every 3-4 days will have a candy bar     Did walk over mothers day, plans to start walking around the block at the drop in center    Nutrition Prescription  Decrease calorie containing beverages, increased protein decreased carbohydrates    Nutrition Diagnosis  Obesity r/t long history of self-monitoring deficit and excessive energy intake aeb BMI >30. -improving    Nutrition Intervention  Materials/education provided. Patient reported that she is not drinking as many calories as she reduced how many coffees she will have at the drop in " center.  She has not binged on sweets as often, now having a sweet every 3-4 days.  She would like to try and reduce how often she eats out, she noted that she will be going to a buffet tomorrow discussed tips and strategies.    Patient Understanding: fair/good  Expected Compliance: fair/good     Nutrition Goals  1) Reduce calorie containing beverages - cut out coffee at drop in center  2) Reduce diet pepsi by 50%, drink one bottle per day  3) Limit sweet treats to 1 per week  4) Decrease portions at meals, follow the plate method  5) Decreasing eating out to 2 times per week    Follow-Up:  PRN    Time spent with patient: 30 minutes.  Belen Oliver, RD, LD

## 2019-05-15 NOTE — PATIENT INSTRUCTIONS
Nutrition Goals  1) Reduce calorie containing beverages - cut out coffee at drop in center  2) Reduce diet pepsi by 50%, drink one bottle per day  3) Limit sweet treats to 1 per week  4) Decrease portions at meals, follow the plate method  5) Decreasing eating out to 2 times per week    Follow up with RD in one-two months    Belen Oliver RD, LD  If you need to schedule or reschedule with a dietitian please call [phone].

## 2019-05-15 NOTE — TELEPHONE ENCOUNTER
Medication Refill Request    Medication(s) requested:  metoprolol succinate (TOPROL-XL) 25 MG 24 hr tablet  dulaglutide (TRULICITY) 1.5 MG/0.5ML pen (Discontinued)    Last Office Visit: 5/7/19  Next Office Visit: 6/4/19  Labs: up to date     Metoprolol approved and refilled per [facility] protocol.  Trulicity discontinued on 5/7/19 for 'alternative therapy'. Refusing this refill request.    Corine Cunha RN  05/15/19  8:38 AM

## 2019-05-15 NOTE — LETTER
"5/15/2019       RE: Nena Tang  [address] [facility] [address]     Dear Colleague,    Thank you for referring your patient, Nena Tang, to the [facility] SURGICAL WEIGHT MANAGEMENT at [facility]. Please see a copy of my visit note below.    Nena Tang is a 59 year old female presents today for follow up weight management nutrition consultation.  Patient referred by Debbie PRECIADO.    Patient has Medicare and history of diabetes, ABN signed 4/15/19-4/15/2020.    Estimated body mass index is 46.74 kg/m  as calculated from the following:    Height as of an earlier encounter on 4/15/19: 1.524 m (5').    Weight as of an earlier encounter on 4/15/19: 108.5 kg (239 lb 4.8 oz).  Current weight: 236.8 lbs (-2.5 lbs in the past month)       Nutrition history  See MD note for details.  Lives in a group home  Lactose intolerant - unable to drink milk on its own can have some ice cream    Recent food recall:  Breakfast: skips or will have cereal(couple times per week) tries not to eat breakfast because she does have an appetite  Lunch: noodles or chicken tenders(with ranch) or chicken sandwich with fries and a drink(diet)  Dinner: eats whatever is prepared - cheese and \"something else\" last night or PB toast  Beverages: one cup of coffee, diet pop (one bottle only at the drop in center)   Dinning out: three times per week - [employer], [employer] or [employer](sundays)    Progress with previous goals:  1) Reduce calorie containing beverages - cut out coffee at drop in center one cup of coffee    2) Reduce diet pepsi by 50%, drink one bottle per day met   3) Limit to one sweet treat from son per month decreased sweets to once every 3-4 days will have a candy bar     Did walk over mothers day, plans to start walking around the block at the drop in center    Nutrition Prescription  Decrease calorie containing beverages, increased protein decreased " carbohydrates    Nutrition Diagnosis  Obesity r/t long history of self-monitoring deficit and excessive energy intake aeb BMI >30. -improving    Nutrition Intervention  Materials/education provided. Patient reported that she is not drinking as many calories as she reduced how many coffees she will have at the drop in center.  She has not binged on sweets as often, now having a sweet every 3-4 days.  She would like to try and reduce how often she eats out, she noted that she will be going to a buffet tomorrow discussed tips and strategies.    Patient Understanding: fair/good  Expected Compliance: fair/good     Nutrition Goals  1) Reduce calorie containing beverages - cut out coffee at [facility] in center  2) Reduce diet pepsi by 50%, drink one bottle per day  3) Limit sweet treats to 1 per week  4) Decrease portions at meals, follow the plate method  5) Decreasing eating out to 2 times per week    Follow-Up:  PRN    Time spent with patient: 30 minutes.  Belen Oliver RD, LD           Again, thank you for allowing me to participate in the care of your patient.      Sincerely,    Belen Oliver RD

## 2019-05-15 NOTE — PROGRESS NOTES
"SUBJECTIVE/OBJECTIVE:                Nena Tang is a 59 year old female coming in for a follow-up visit for Medication Therapy Management.  She was referred to me from Debbie Germain PA-C.     Chief Complaint: Follow up from our visit on 5/17/19.  Seeing MTM Pharmacist Eugenia De Jesus. Blood sugars are trending upward since last week.     Tobacco: No tobacco use  Alcohol: not currently using    Medication Adherence/Access:  no issues reported. She states \"I dont know what I take, but I do take them and I don't forget my medications, except maybe the Novolog\".     Diabetes:  Per patient, patient currently taking Lantus 48 units nightly, Novolog 20-25 units 2-3 times daily with meals. Stopped Trulicity 1.5 mg weekly, last week and started Ozempic 0.25 mg weekly. Blood sugars have continued to be elevated despite prednisone decrease dose.  Pt is not experiencing side effects. She is tolerating transition from Trulicity to Ozempic, no nausea. Was prescribed Freestyle Gabby, waiting on PA.   SMBG: 3-4 times daily. Ranges (glucose log)  Date FBG/ 2hours post Lunch/2hours post Dinner /2hours post   5/15 182     5/14 198 270 367/322   5/13 173 267 309/361 (thinks she forgot to take Novolog)    5/12 No blood sugar 203 286/366 (thinks she forgot to take Novolog)   5/11 No blood sugar 281 307/421 (thinks she forgot to take Novolog    Patient is not experiencing hypoglycemia   Recent symptoms of high blood sugar? Fatigued, history of blurred vision when blood sugars are high but she states she is not having issue currently. No polydipsia per patient. No polyphagia per patient.   Eye exam: up to date  Foot exam: up to date  Microalbumin is < 30 mg/g. Pt is taking an ACEi/ARB.  Aspirin: Taking 81mg daily and denies side effects  Diet/Exercise: Met with dietician today. She finds that she is still hungry and that appetite has been worse over the last 1-2 months.   Lab Results   Component Value Date    A1C 6.4 12/20/2018    " A1C 6.4 08/06/2018    A1C 6.2 05/22/2018    A1C 6.8 02/12/2018    A1C 8.9 12/09/2017     Today's Vitals: /60   Pulse 70   Wt 236 lb 12.8 oz (107.4 kg)   LMP 01/06/2015   BMI 46.25 kg/m      ASSESSMENT:              Current medications were reviewed today as discussed above.      Medication Adherence: good, issues identified    Diabetes: Needs improvement. A1c is at goal, but due to elevated blood sugars likely attributed to prednisone use and transitioning from Trulicity to Ozempic. Patient would benefit from increasing Lantus for time being from 48 to 54 units daily. She would also benefit from increasing Ozempic sooner to 0.5 mg weekly. She would benefit from adherence of Novolog dosing, particularly with dinner.      PLAN:                  1. Patient would benefit from increasing Basaglar to 54 units daily and increasing Ozempic to 0.5 mg weekly, will triage with Debbie Germain PA-C for her thoughts.  Will call patient once authorized. Verified patient phone number and discussed that would call today with information. Update 5/15 PM, Above authorized by Debbie Germain PA-C. Called patient X2 to discuss and left VM to call back to discuss change in medications. Will call patient again tomorrow.     Update 5/16: Patient arrived to ED for hyperglycemia later in day after seen in clinic due to elevated blood sugar. Defer treatment to hospitalist for blood sugar management at this time.     2. Administer Novolog injection before each meal to ensure appropriate coverage of blood sugars. She is to follow adding extra 5 units of Novolog if blood sugar is >500.     I spent 20 minutes with this patient today. I offer these considerations for Debbie Germain PA-C. A copy of the visit note was provided to the patient's referring provider, primary MTM pharmacist and PCP.     Will follow up in 2 weeks with MTM pharmacistEugenia.    The patient declined a summary of these recommendations as an after visit  summary.    Lauren Turner Bloch, PharmD  Medication Therapy Management Pharmacist   [facility]   Phone: [phone]

## 2019-05-15 NOTE — ED NOTES
Bed: ED27  Expected date: 5/15/19  Expected time: 6:03 PM  Means of arrival: Ambulance  Comments:  ALESSANDRA

## 2019-05-15 NOTE — Clinical Note
In between time when patient left clinic and obtained authorization to change Basaglar and Ozempic, patient went to ED due to blood sugar >400 when she got home. I did not see the ED admission, so tried calling her x2 to get a hold of her, but obviously she was in ED, she has been admitted for observation. Antonella WEISS

## 2019-05-16 ENCOUNTER — NURSE TRIAGE (OUTPATIENT)
Dept: NURSING | Facility: CLINIC | Age: 59
End: 2019-05-16

## 2019-05-16 VITALS
DIASTOLIC BLOOD PRESSURE: 60 MMHG | HEART RATE: 69 BPM | HEIGHT: 60 IN | BODY MASS INDEX: 47.59 KG/M2 | WEIGHT: 242.4 LBS | TEMPERATURE: 96.5 F | RESPIRATION RATE: 16 BRPM | SYSTOLIC BLOOD PRESSURE: 150 MMHG | OXYGEN SATURATION: 92 %

## 2019-05-16 LAB
ANION GAP SERPL CALCULATED.3IONS-SCNC: 5 MMOL/L (ref 3–14)
BUN SERPL-MCNC: 14 MG/DL (ref 7–30)
CALCIUM SERPL-MCNC: 8.1 MG/DL (ref 8.5–10.1)
CHLORIDE SERPL-SCNC: 110 MMOL/L (ref 94–109)
CO2 SERPL-SCNC: 25 MMOL/L (ref 20–32)
CREAT SERPL-MCNC: 0.86 MG/DL (ref 0.52–1.04)
GFR SERPL CREATININE-BSD FRML MDRD: 75 ML/MIN/{1.73_M2}
GLUCOSE BLDC GLUCOMTR-MCNC: 255 MG/DL (ref 70–99)
GLUCOSE BLDC GLUCOMTR-MCNC: 275 MG/DL (ref 70–99)
GLUCOSE SERPL-MCNC: 307 MG/DL (ref 70–99)
INTERPRETATION ECG - MUSE: NORMAL
LACTATE BLD-SCNC: 1.4 MMOL/L (ref 0.7–2)
MAGNESIUM SERPL-MCNC: 1.9 MG/DL (ref 1.6–2.3)
POTASSIUM SERPL-SCNC: 4.1 MMOL/L (ref 3.4–5.3)
SODIUM SERPL-SCNC: 140 MMOL/L (ref 133–144)

## 2019-05-16 PROCEDURE — 80048 BASIC METABOLIC PNL TOTAL CA: CPT | Performed by: INTERNAL MEDICINE

## 2019-05-16 PROCEDURE — 25000131 ZZH RX MED GY IP 250 OP 636 PS 637: Performed by: INTERNAL MEDICINE

## 2019-05-16 PROCEDURE — 25000132 ZZH RX MED GY IP 250 OP 250 PS 637: Performed by: INTERNAL MEDICINE

## 2019-05-16 PROCEDURE — 96372 THER/PROPH/DIAG INJ SC/IM: CPT

## 2019-05-16 PROCEDURE — 83605 ASSAY OF LACTIC ACID: CPT | Performed by: INTERNAL MEDICINE

## 2019-05-16 PROCEDURE — 83735 ASSAY OF MAGNESIUM: CPT | Performed by: INTERNAL MEDICINE

## 2019-05-16 PROCEDURE — G0378 HOSPITAL OBSERVATION PER HR: HCPCS

## 2019-05-16 PROCEDURE — 99217 ZZC OBSERVATION CARE DISCHARGE: CPT | Performed by: INTERNAL MEDICINE

## 2019-05-16 PROCEDURE — A9270 NON-COVERED ITEM OR SERVICE: HCPCS | Performed by: INTERNAL MEDICINE

## 2019-05-16 PROCEDURE — 00000146 ZZHCL STATISTIC GLUCOSE BY METER IP

## 2019-05-16 PROCEDURE — 36415 COLL VENOUS BLD VENIPUNCTURE: CPT | Performed by: INTERNAL MEDICINE

## 2019-05-16 RX ADMIN — PREDNISONE 5 MG: 5 TABLET ORAL at 08:52

## 2019-05-16 RX ADMIN — ATORVASTATIN CALCIUM 80 MG: 40 TABLET, FILM COATED ORAL at 08:52

## 2019-05-16 RX ADMIN — TOPIRAMATE 75 MG: 50 TABLET ORAL at 03:18

## 2019-05-16 RX ADMIN — METOPROLOL SUCCINATE 25 MG: 25 TABLET, EXTENDED RELEASE ORAL at 08:52

## 2019-05-16 RX ADMIN — LOSARTAN POTASSIUM 50 MG: 50 TABLET, FILM COATED ORAL at 08:52

## 2019-05-16 RX ADMIN — RANITIDINE 300 MG: 150 TABLET ORAL at 03:18

## 2019-05-16 RX ADMIN — ASPIRIN 81 MG: 81 TABLET, COATED ORAL at 08:52

## 2019-05-16 RX ADMIN — SERTRALINE HYDROCHLORIDE 200 MG: 100 TABLET ORAL at 08:52

## 2019-05-16 RX ADMIN — TOPIRAMATE 75 MG: 50 TABLET ORAL at 08:52

## 2019-05-16 RX ADMIN — BENZTROPINE MESYLATE 1 MG: 1 TABLET ORAL at 09:04

## 2019-05-16 RX ADMIN — INSULIN ASPART 20 UNITS: 100 INJECTION, SOLUTION INTRAVENOUS; SUBCUTANEOUS at 08:43

## 2019-05-16 RX ADMIN — SENNOSIDES AND DOCUSATE SODIUM 1 TABLET: 8.6; 5 TABLET ORAL at 09:02

## 2019-05-16 NOTE — ED NOTES
[facility]  ED Nurse Handoff Report    Nena Tang is a 59 year old female   ED Chief complaint: Hyperglycemia  . ED Diagnosis:   Final diagnoses:   Hyperglycemia   Dehydration     Allergies:   Allergies   Allergen Reactions     Imidazole Antifungals Hives     Tolerates diflucan     Ketoprofen Itching     Pruritis to topical     Lisinopril Hives     Metformin Other (See Comments)     Patient hospitalized for lactic acidosis - admitting provider suspectd caused by metformin     Metronidazole Hives     Posaconazole Hives     Tolerates diflucan       Code Status: Full Code  Activity level - Baseline/Home:  Independent. Activity Level - Current:   Independent. Lift room needed: No. Bariatric: No   Needed: No   Isolation: No. Infection: Not Applicable.     Vital Signs:   Vitals:    05/15/19 1816 05/15/19 1830 05/15/19 2015 05/15/19 2020   BP: 176/72 157/71 140/62 141/73   Pulse:  90 85 90   Resp:  13     Temp:       SpO2:  97% 93%    Weight:           Cardiac Rhythm:  ,      Pain level:    Patient confused: No. Patient Falls Risk: Yes.   Elimination Status: Has voided   Patient Report - Initial Complaint: High blood sgar. Focused Assessment: A&O. Up with SBA. C/O dizziness and nausea as well as high blood sugars at home. Pt reports taking DM meds at prescribed. BG improving with IVF. Zofran given for nausea with improvement.    Tests Performed: CT head, Labs. Abnormal Results:   Labs Ordered and Resulted from Time of ED Arrival Up to the Time of Departure from the ED   CBC WITH PLATELETS DIFFERENTIAL - Abnormal; Notable for the following components:       Result Value    RBC Count 3.24 (*)     Hemoglobin 10.3 (*)     Hematocrit 32.5 (*)     All other components within normal limits   COMPREHENSIVE METABOLIC PANEL - Abnormal; Notable for the following components:    Glucose 370 (*)     Bilirubin Total <0.1 (*)     Albumin 3.2 (*)     All other components within normal limits   ROUTINE UA  WITH MICROSCOPIC - Abnormal; Notable for the following components:    Glucose Urine >1000 (*)     All other components within normal limits   HEMOGLOBIN A1C - Abnormal; Notable for the following components:    Hemoglobin A1C 7.6 (*)     All other components within normal limits   LACTIC ACID - Abnormal; Notable for the following components:    Lactic Acid 2.2 (*)     All other components within normal limits   GLUCOSE BY METER - Abnormal; Notable for the following components:    Glucose 256 (*)     All other components within normal limits   ISTAT  GASES LACTATE DOMINGO POCT - Abnormal; Notable for the following components:    Ph Venous 7.26 (*)     Bicarbonate Venous 20 (*)     Lactic Acid 2.9 (*)     All other components within normal limits   ISTAT BASIC MET ICA HCT POCT - Abnormal; Notable for the following components:    Glucose 380 (*)     Hemoglobin 10.9 (*)     Hematocrit - POCT 32 (*)     All other components within normal limits   GLUCOSE MONITOR NURSING POCT   TROPONIN I   KETONE BETA-HYDROXYBUTYRATE QUANTITATIVE   OSMOLALITY   HCG QUALITATIVE URINE   NT PROBNP INPATIENT   ISTAT GAS OR ELECTROLYTE NURSING POCT   CARDIAC CONTINUOUS MONITORING   NOTIFY PHYSICIAN   PERIPHERAL IV CATHETER   IV ACCESS   ISTAT CG4 GASES LACTATE DOMINGO NURSING POCT   NOTIFY PHYSICIAN   ORTHOSTATIC BLOOD PRESSURE AND PULSE     XR Chest 2 Views   Final Result   IMPRESSION: No acute disease.      ADELITA CHAPA MD      CT Head w/o Contrast   Final Result   IMPRESSION: No evidence of acute intracranial hemorrhage, mass, or   herniation.         EDMUNDO GOMEZ MD        .   Treatments provided: IVF, Zofran  Family Comments: NA  OBS brochure/video discussed/provided to patient:  Yes  ED Medications:   Medications   ondansetron (ZOFRAN) injection 4 mg (4 mg Intravenous Given 5/15/19 1831)   lactated ringers BOLUS 1,000 mL (1,000 mLs Intravenous New Bag 5/15/19 2057)   lactated ringers BOLUS 1,000 mL (0 mLs Intravenous Stopped 5/15/19 2054)      Drips infusing:  No  For the majority of the shift, the patient's behavior Green. Interventions performed were NA.     Severe Sepsis OR Septic Shock Diagnosis Present: No      ED Nurse Name/Phone Number: Richa Reagan,   9:21 PM  RECEIVING UNIT ED HANDOFF REVIEW    Above ED Nurse Handoff Report was reviewed: Yes  Reviewed by: Parul PAIGE Che on May 15, 2019 at 10:23 PM

## 2019-05-16 NOTE — PLAN OF CARE
PRIMARY DIAGNOSIS: HYPERGLYCEMIA    OUTPATIENT/OBSERVATION GOALS TO BE MET BEFORE DISCHARGE  BG greater than 100 and less than 250 on two consecutive readings: Yes  Recent Labs   Lab Test 05/16/19  0320 05/15/19  2300 05/15/19  2058   * 189* 256*         Ketones absent from urine  (hyperglycemia): Yes    Tolerating oral intake to maintain hydration: Yes    Return to near baseline physical activity: Yes    Discharge Planner Nurse   Safe discharge environment identified: Yes  Barriers to discharge: No       Entered by: Blanka Kraus 05/16/2019 9:55 AM     Please review provider order for any additional goals.   Nurse to notify provider when observation goals have been met and patient is ready for discharge.    Pt is alert and oriented. Denies pain. Feels ready to go home. Pt states she has transportation lined up.

## 2019-05-16 NOTE — TELEPHONE ENCOUNTER
"Foster care attendant Zheng is caller ([phone]).  Patient was discharged from [facility] today.  She had been hospitalized for hyperglycemia and dehydration.  Caller notices her hands and feet are quite swollen now and it seems to be getting worse.  The swelling is just in both her hands, not up the arm.  When the patient makes a fist, he cannot see her knuckles.    The swelling is in both feet, below the ankle. No pain with walking    When pressed on bottom of the ankle and the top of the hand, there was not pitting.    Didn't hold an indent- patient said it was painful  Patient states it started yesterday    No pain or discomfort anywhere else in her body.  Acting normal    Patient states she is having \"a little bit' difficulty breathing as she is sitting there.  Rates it as a 5/10    No Fever.    Advised to see PCP within 24 hours. Caller states patient needs to make a follow-up appointment anyway.  Call is transferred to scheduling to make an appointment for tomorrow.      Protocol and care advice reviewed  Caller states understanding of the recommended disposition  Advised to call back if further questions or concerns    Jessica aNvarro RN / [facility]    Reason for Disposition    [1] Very swollen joint AND [2] no fever    [1] MILD swelling (puffiness) of both hands AND [2] new onset or worsening  (Exception: caused by hot weather or normal pregnancy swelling)    Additional Information    Negative: Difficulty breathing    Negative: Entire foot is cool or blue in comparison to other side    Negative: Fever    Negative: Patient sounds very sick or weak to the triager    Negative: [1] SEVERE pain (e.g., excruciating, unable to walk) AND [2] not improved after 2 hours of pain medicine    Negative: [1] Can't move swollen joint at all AND [2] no fever    Negative: [1] Redness AND [2] painful when touched AND [3] no fever    Negative: [1] Red area or streak [2] large (> 2 in. or 5 cm)    Negative: " [1] Thigh or calf pain AND [2] only 1 side AND [3] present > 1 hour    Negative: [1] Thigh, calf, or ankle swelling AND [2] only 1 side    Negative: [1] Thigh, calf, or ankle swelling AND [2] bilateral AND [3] 1 side is more swollen    Negative: Severe difficulty breathing (e.g., struggling for each breath, speaks in single words)    Negative: Sounds like a life-threatening emergency to the triager    Negative: Difficulty breathing at rest    Negative: Looks like a broken bone or dislocated joint (e.g., crooked or deformed)    Negative: Entire hand is cool or blue in comparison to other side    Negative: [1] Can't use hand or can barely use hand AND [2] new onset    Negative: [1] Difficulty breathing with exertion (e.g., walking) AND [2] new onset or worsening    Negative: [1] Red area or streak AND [2] fever    Negative: [1] Swelling is painful to touch AND [2] fever    Negative: [1] Cast arm AND [2] now increased pain    Negative: [1] Postpartum (i.e. < 6 weeks since delivery) AND [2] new blurred vision or vision changes    Negative: [1] Postpartum (i.e. < 6 weeks since delivery) AND [2] face swelling    Negative: Patient sounds very sick or weak to the triager    Negative: [1] Pregnant > 20 weeks AND [2] face swelling    Negative: [1] Pregnant > 20 weeks AND [2] new blurred vision or vision changes    Negative: SEVERE hand swelling (e.g., swelling of entire hand and up into forearm)    Negative: [1] Red area or streak [2] large (> 2 in. or 5 cm)    Negative: MODERATE hand swelling (e.g., visible swelling of hand and fingers; pitting edema)    Protocols used: ANKLE SWELLING-A-AH, HAND SWELLING-A-AH

## 2019-05-16 NOTE — PLAN OF CARE
Patient's After Visit Summary was reviewed with patient.   Patient verbalized understanding of After Visit Summary, recommended follow up and was given an opportunity to ask questions.   Discharge medications sent home with patient/family: Not applicable   Discharged to [facility], waiting for group home per pt request.

## 2019-05-16 NOTE — PROGRESS NOTES
Left message at [facility] at [phone] requesting call back to coordinate discharge today.

## 2019-05-16 NOTE — PROGRESS NOTES
I saw and examined patient and spoke with Zheng at [facility] home she lives.  Zheng stated that she has supervision with insulin regimen and has integrated primary care service.  Plan   -- stop IVF   - get her 8 units of insulin Novolog    -- get her up and ambulate   -- may discharge home later today   -- discharge instructions placed - regimen modified   -- group home staff to supervise patient's insulin administration

## 2019-05-16 NOTE — PLAN OF CARE
ROOM # 208-2    Living Situation (if not independent, order SW consult):  Facility name:  :  [facility]     Activity level at baseline: Independent with walker  Activity level on admit: SBA      Patient registered to observation; given Patient Bill of Rights; given the opportunity to ask questions about observation status and their plan of care.  Patient has been oriented to the observation room, bathroom and call light is in place.    Discussed discharge goals and expectations with patient/family.

## 2019-05-16 NOTE — ED PROVIDER NOTES
Emergency Department Attending Supervision Note  5/15/2019  7:33 PM      I evaluated this patient in conjunction with Nikolai Sierra PA-C.  Please refer to his documentation as well.      Briefly, the patient has a known history of DM who is presenting with hyperglycemia. Earlier this afternoon, the patient began experiencing dizziness, blurred vision, and polydipsia. After checking her blood sugar at home, it was 500 so EMS was called. Upon EMS arrival, blood sugar was 422 and EMS started her on IV fluids. Upon arrival at the ED, her blood sugar was 418. Patient reports having a mild nonproductive cough. She denies nausea, vomiting, fevers, headache, shortness of breath, dysuria or any other symptoms. Patient has been taking medications as prescribed and has had no recent diet changes.     On my exam,   Nursing note and vitals reviewed.  Constitutional: Well nourished. Resting comfortably.   Eyes: Conjunctiva normal.  Pupils are equal, round, and reactive to light.   ENT: Nose normal. Mucous membranes pink and dry.  TM normal.   Neck: Normal range of motion.  CVS: Normal rate, regular rhythm.  Normal heart sounds.  No murmur.  Pulmonary: Lungs clear to auscultation bilaterally. No wheezes/rales/rhonchi.  GI: Obese. Abdomen soft. Nontender, nondistended. No rigidity or guarding.    MSK: No calf tenderness or swelling.  Neuro: Awake, alert. Speech is normal and fluent. Face is symmetric. EOMI. PERRL. Moves all extremities. Normal finger-nose-finger.  strength equal bilaterally. Equal sensation bilaterally on UE/LE and face. No arm or leg drift. Gait stable   Skin: Skin is warm and dry. No rash noted.   Psychiatric: Normal affect.       Results:  Labs Ordered and Resulted from Time of ED Arrival Up to the Time of Departure from the ED   CBC WITH PLATELETS DIFFERENTIAL - Abnormal; Notable for the following components:       Result Value    RBC Count 3.24 (*)     Hemoglobin 10.3 (*)     Hematocrit 32.5 (*)     All  other components within normal limits   COMPREHENSIVE METABOLIC PANEL - Abnormal; Notable for the following components:    Glucose 370 (*)     Bilirubin Total <0.1 (*)     Albumin 3.2 (*)     All other components within normal limits   OSMOLALITY - Abnormal; Notable for the following components:    Osmolality 303 (*)     All other components within normal limits   ROUTINE UA WITH MICROSCOPIC - Abnormal; Notable for the following components:    Glucose Urine >1000 (*)     All other components within normal limits   HEMOGLOBIN A1C - Abnormal; Notable for the following components:    Hemoglobin A1C 7.6 (*)     All other components within normal limits   LACTIC ACID - Abnormal; Notable for the following components:    Lactic Acid 2.2 (*)     All other components within normal limits   GLUCOSE BY METER - Abnormal; Notable for the following components:    Glucose 256 (*)     All other components within normal limits   ISTAT  GASES LACTATE DOMINGO POCT - Abnormal; Notable for the following components:    Ph Venous 7.26 (*)     Bicarbonate Venous 20 (*)     Lactic Acid 2.9 (*)     All other components within normal limits   ISTAT BASIC MET ICA HCT POCT - Abnormal; Notable for the following components:    Glucose 380 (*)     Hemoglobin 10.9 (*)     Hematocrit - POCT 32 (*)     All other components within normal limits   GLUCOSE MONITOR NURSING POCT   TROPONIN I   KETONE BETA-HYDROXYBUTYRATE QUANTITATIVE   HCG QUALITATIVE URINE   NT PROBNP INPATIENT   ISTAT GAS OR ELECTROLYTE NURSING POCT   CARDIAC CONTINUOUS MONITORING   NOTIFY PHYSICIAN   PERIPHERAL IV CATHETER   IV ACCESS   ISTAT CG4 GASES LACTATE DOMINGO NURSING POCT   NOTIFY PHYSICIAN   ORTHOSTATIC BLOOD PRESSURE AND PULSE     XR Chest 2 Views   Final Result   IMPRESSION: No acute disease.      ADELITA CHAPA MD      CT Head w/o Contrast   Final Result   IMPRESSION: No evidence of acute intracranial hemorrhage, mass, or   herniation.         EDMUNDO GOMEZ MD            MDM:    Nena Tang is a 59 year old female presenting with reported hyperglycemia. She is nontoxic on arrival and without obvious focal neuro deficits. Screening EKG without focal ischemia or underlying arrhythmia.  She had a screening troponin which was unremarkable.  Her labs were reviewed and noted hyperglycemia, though no evidence of DKA. Patient's lactic acid was mildly elevated as well, though I suspect this is more secondary to mild dehydration. Her repeat lactate improved after IV fluids. Patient underwent a significant work up, attempting to identifying potential sources of infection to explain hyperglycemia, although chest x-ray was without focal pneumonia and UA was unremarkable. She is without meningeal signs.  She has a nonperitoneal abdomen and I doubt intraabdominal catastrophe. Patient also underwent a head CT given reported dizziness, though this was without focal process. I doubt central process.  The patient is not orthostatic. The patient reported some symptom improvement during her time in the ED but did not feel comfortable going home.  Her glucose has downtrended and she remained hemodynamically stable. She was accepted by hospitalist for glucose monitoring primarily.      Diagnosis    ICD-10-CM    1. Hyperglycemia R73.9 Osmolality     Glucose by meter     Glucose by meter   2. Dehydration E86.0        Valeria Ag DO    Scribe Disclosure:  I, Mary Meadows, am serving as a scribe on 5/15/2019 at 7:52 PM to personally document services performed by Valeria Ag DO based on my observations and the provider's statements to me.          Valeria Ag DO  05/15/19 4522

## 2019-05-16 NOTE — H&P
[facility]  History and Physical   Hospitalist Service    Geronimo Qiules MD    Nena Tang MRN# [id number]   YOB: 1960 Age: 59 year old      Date of Admission:  5/15/2019           Assessment and Plan:   Nena Tang is a 59-year-old female with history of type 2 diabetes, uterine cancer, Takotsubo cardiomyopathy, schizoaffective disorder, obstructive sleep apnea for which she uses CPAP, depression, migraines, herbal bowel syndrome, hypertension, hypercholesterolemia, coronary artery disease, and chronic low back pain.  She presented to the emergency department this evening for evaluation of high blood sugar.  Earlier in the day she felt dizzy and developed blurred vision.  She was thirsty and urinating frequently.  She checked her blood sugar and found it to be over 500.  She came to the emergency department for evaluation.  Blood sugar was elevated at 380.  Vital signs were stable.  Lactic acid was elevated at 2.9. CBC, basic metabolic panel, liver function tests, ionized calcium, troponin, pregnancy test, and urinalysis were unremarkable.  Chest x-ray showed no acute process.  CT of head showed no acute process.  She was given IV fluids with improvement of blood sugar.  She was still feeling weak and poorly so I was asked to admit her to observation.  The cause of her hyperglycemia is not clear.  She does not seem to have an infection.  She reports being compliant with her medications.  Of note, she has been on prednisone for the last month because of some shoulder pain.  When asked, she stated that she had been checking her blood sugars 4 times daily but did not seem very confident in that answer.    Problem list:    1.  Type 2 diabetes with hyperglycemia.  Cause is unclear.  I suspect this is related to her recent prednisone for her shoulder.  I am not sure that she has been checking her blood sugars.  She has improved with normal saline hydration.  I will continue  normal saline hydration.  I will order her prior to admission Lantus insulin 40 units at bedtime and NovoLog insulin 20 units 3 times daily with meals.  I will add high resistance sliding scale.  I suspect she will be doing better tomorrow and be able to discharge home.    2.  Dehydration due to glycosuric diuresis from hyperglycemia.  Hydrate with normal saline.  Repeat basic metabolic panel and magnesium tomorrow.  Electrolyte replacement protocols will be ordered.    3.  Lactic acidosis.  Suspected due to dehydration.  Improving.  Repeat lactic acid in the morning.    Full code  Ambulate for DVT prophylaxis  Disposition: Admit to observation.  Likely discharge home tomorrow.           Code Status:   Full Code         Primary Care Physician:   Rowena Haas [phone]         Chief Complaint:   Dizziness and blurred vision    History is obtained from Nena, ED provider, and the medical record         History of Present Illness:   Nena TINYRichar Tang is a 59-year-old female with history of type 2 diabetes, uterine cancer, Takotsubo cardiomyopathy, schizoaffective disorder, obstructive sleep apnea for which she uses CPAP, depression, migraines, herbal bowel syndrome, hypertension, hypercholesterolemia, coronary artery disease, and chronic low back pain.  She presented to the emergency department this evening for evaluation of high blood sugar.  Earlier in the day she felt dizzy and developed blurred vision.  She was thirsty and urinating frequently.  She checked her blood sugar and found it to be over 500.  She came to the emergency department for evaluation.  Blood sugar was elevated at 380.  Vital signs were stable.  Lactic acid was elevated at 2.9. CBC, basic metabolic panel, liver function tests, ionized calcium, troponin, pregnancy test, and urinalysis were unremarkable.  Chest x-ray showed no acute process.  CT of head showed no acute process.  She was given IV fluids with improvement of blood sugar.   She was still feeling weak and poorly so I was asked to admit her to observation.  The cause of her hyperglycemia is not clear.  She does not seem to have an infection.  She reports being compliant with her medications.  Of note, she has been on prednisone for the last month because of some shoulder pain.  When asked, she stated that she had been checking her blood sugars 4 times daily but did not seem very confident in that answer.           Past Medical History:     Patient Active Problem List   Diagnosis     Osteopenia     Vitamin B12 deficiency without anemia     Hyperlipidemia LDL goal <100     Rotator cuff syndrome     Type 2 diabetes mellitus with mild nonproliferative retinopathy (H)     Illiterate     Irritable bowel syndrome     overweight - BMI >35     Takotsubo cardiomyopathy     CAD (coronary artery disease)     Restless legs syndrome (RLS)     CINDY (obstructive sleep apnea)- mild AHI 10.3     Verbal auditory hallucination     Chronic low back pain     Schizoaffective disorder, depressive type (H)     Migraine headache     HTN, goal below 140/90     Health Care Home     Lumbago     Cervicalgia     Cocaine abuse, episodic (H)     Suicidal ideation     Esophageal reflux     Mild nonproliferative diabetic retinopathy (H)     Tardive dyskinesia     Alcohol use     Left cataract     Falls frequently     History of uterine cancer     Psychophysiological insomnia     Dysuria     Asymptomatic postmenopausal status     Abdominal pain, right lower quadrant     Infectious encephalopathy     Non-intractable vomiting with nausea     Thoughts of self harm     Gastroenteritis     Posttraumatic stress disorder     Cervical cancer screening     Type 2 diabetes mellitus with stage 3 chronic kidney disease, with long-term current use of insulin (H)     Positive AIDA (antinuclear antibody)     Hyperglycemia      Past Medical History:   Diagnosis Date     Acute respiratory failure with hypoxia (H) 9/4/2017     CAD (coronary  artery disease)     5/2014 cath, nonbostructive stenosis to LAD, RCA.     Chronic low back pain 1/22/2013     Cocaine abuse, in remission (H)      Fecal urgency 3/8/2012     History of heroin abuse      Hyperlipidemia LDL goal <100 10/31/2010     Hypertension 7/29/2013     Illiterate 8/30/2011     Irritable bowel syndrome      Left cataract      Migraine 4/19/2012     Migraine headache 4/22/2013     Moderate major depression (H) 6/8/2011     Noncompliance with medication regimen 6/8/2011     Obesity      CINDY (obstructive sleep apnea) 3/8/2012    uses CPAP     Osteopenia 10/7/2009     Pneumonia of right lower lobe due to infectious organism (H) 9/4/2017     Schizoaffective disorder, depressive type (H) 2/25/2013     Sepsis (H) 8/29/2017     Suicidal intent 10/2/2013     Takotsubo cardiomyopathy      Type 2 diabetes mellitus (H) 8/30/2011     Uterine cancer (H) 1983     Verbal auditory hallucination 10/4/2012             Past Surgical History:     Past Surgical History:   Procedure Laterality Date     C OOPHORECTORMY FOR MALIG, W/BX  1983    UTERINE     CATARACT IOL, RT/LT Bilateral 2017     COLONOSCOPY N/A 3/16/2017    Procedure: COLONOSCOPY;  Surgeon: Traci Gonzalez MD;  Location:  GI     Coronary CTA  5/21/2014     HYSTERECTOMY  1983    uterine cancer yearly pap's per provider.     LAPAROSCOPIC CHOLECYSTECTOMY  2008     PHACOEMULSIFICATION CLEAR CORNEA WITH STANDARD INTRAOCULAR LENS IMPLANT Left 5/5/2017    Procedure: PHACOEMULSIFICATION CLEAR CORNEA WITH STANDARD INTRAOCULAR LENS IMPLANT;  LEFT EYE PHACOEMULSIFICATION CLEAR CORNEA WITH STANDARD INTRAOCULAR LENS IMPLANT ;  Surgeon: Tyra Diaz MD;  Location: [facility]     PHACOEMULSIFICATION CLEAR CORNEA WITH STANDARD INTRAOCULAR LENS IMPLANT Right 6/30/2017    Procedure: PHACOEMULSIFICATION CLEAR CORNEA WITH STANDARD INTRAOCULAR LENS IMPLANT;  RIGHT EYE PHACOEMULSIFICATION CLEAR CORNEA WITH STANDARD INTRAOCULAR LENS IMPLANT;  Surgeon: Emily  Tyra EASTON MD;  Location:  [facility]     RELEASE TRIGGER FINGER  10/11/2012    Left thumb. Procedure: RELEASE TRIGGER FINGER;  LEFT THUMB TRIGGER RELEASE;  Surgeon: Tay Langley MD;  Location:  [facility]     RELEASE TRIGGER FINGER Right 12/26/2016    Procedure: RELEASE TRIGGER FINGER;  Surgeon: Albino Csatañeda MD;  Location:  OR            Home Medications:     Prior to Admission medications    Medication Sig Last Dose Taking? Auth Provider   acetaminophen (TYLENOL) 500 MG tablet Take 2 tablets (1,000 mg) by mouth 3 times daily as needed for mild pain   Rowena Haas APRN CNP   albuterol (PROAIR HFA/PROVENTIL HFA/VENTOLIN HFA) 108 (90 Base) MCG/ACT Inhaler Inhale 2 puffs into the lungs every 6 hours as needed for shortness of breath / dyspnea or wheezing   Wendy Tang APRN CNP   aspirin (ASA) 81 MG EC tablet TAKE 1 TABLET (81MG) BY MOUTH DAILY   Rebecca Carr DO   atorvastatin (LIPITOR) 80 MG tablet TAKE 1 TABLET (80MG) BY MOUTH DAILY   Rowena Haas APRN CNP   benztropine (COGENTIN) 0.5 MG tablet Take 2 tablets (1 mg) by mouth 2 times daily   Kraig Pedersen MD   blood glucose (NO BRAND SPECIFIED) test strip Use to test blood sugar  4 times daily or as directed. To accompany: Blood Glucose Monitor Brands: per insurance.   Rebecca Carr DO   calcium carbonate (OS-ALLEN) 1500 (600 Ca) MG tablet Take 3 tablets (1,800 mg) by mouth daily   Rowena Haas APRN CNP   cholecalciferol (VITAMIN D3) 88456 units (1250 mcg) capsule TAKE 1 CAPSULE (50,000 UNITS) MONTHLY   Rebecca Carr DO   Continuous Blood Gluc  (FREESTYLE LEBRON 14 DAY READER) RO 1 Device 4 times daily For continuous glucose monitoring.   Wendy Tang APRN CNP   Continuous Blood Gluc Sensor (FREESTYLE LEBRON 14 DAY SENSOR) MISC 1 Device every 14 days   Rebecca Carr DO   diclofenac (VOLTAREN) 1 % topical gel Place 4 g onto the skin 4 times daily as needed for moderate pain    Wendy Tang APRN CNP   gabapentin (NEURONTIN) 300 MG capsule TAKE 2 CAPSULES (600MG) BY MOUTH IN THE MORNING AND AFTERNOON; 3 CAPSULES AT BEDTIME.   Rowena Haas APRN CNP   glucose 4 G CHEW chewable tablet Take 2 every 15 minutes for blood sugar <70mg/dL. Recheck blood sugar every 15 minutes until above 70mg/dL, then eat a substantial meal.   Rowena Haas APRN CNP   guaiFENesin (ROBITUSSIN) 100 MG/5ML liquid Take 10 mLs (200 mg) by mouth every 4 hours as needed for cough   Wendy Tang APRN CNP   insulin aspart (NOVOLOG FLEXPEN) 100 UNIT/ML pen Inject 20 Units Subcutaneous 3 times daily (with meals) Once daily, can add additional 5 units if BGs are >500mg/dL.   Rowena Haas APRN CNP   insulin glargine (LANTUS SOLOSTAR) 100 UNIT/ML pen Inject 48 Units Subcutaneous At Bedtime   Rowena Haas APRN CNP   insulin pen needle (NOVOFINE 30) 30G X 8 MM miscellaneous USE 4 DAILY OR AS DIRECTED   Rowena Haas APRN CNP   ipratropium - albuterol 0.5 mg/2.5 mg/3 mL (DUONEB) 0.5-2.5 (3) MG/3ML neb solution Take 1 vial (3 mLs) by nebulization every 6 hours as needed for shortness of breath / dyspnea or wheezing   Adriana Georges APRN CNP   leflunomide (ARAVA) 20 MG tablet TAKE 1 TABLET (20 MG) BY MOUTH DAILY (LABS EVERY 8-12 WEEKS)   Eladio Kwong MD   losartan (COZAAR) 50 MG tablet Take 1 tablet (50 mg) by mouth daily   Rebecca Carr DO   melatonin 5 MG CAPS Take 2 capsules by mouth At Bedtime   Rebecca Carr DO   metoprolol succinate ER (TOPROL-XL) 25 MG 24 hr tablet Take 1 tablet (25 mg) by mouth daily   Rowena Haas APRN CNP   order for DME Equipment being ordered: Depends.   Rowena Haas APRN CNP   order for DME Equipment being ordered: Freestyle [employer] [place]   Rebecca Carr DO   paliperidone ER (INVEGA) 9 MG 24 hr tablet Take 1 tablet (9 mg) by mouth every morning   Kraig Pedersen MD   polyethylene glycol  (MIRALAX/GLYCOLAX) powder TAKE 8.5 GRAMS (1/2 CAPFUL) BY MOUTH EVERY OTHER DAY.   Rowena Haas APRN CNP   predniSONE (DELTASONE) 5 MG tablet May 7: Decrease to 10 mg daily,  May 11 decrease to 7.5 mg daily and then May 14 decrease to 5 mg daily..   Rowena Haas APRN CNP   ranitidine (ZANTAC) 300 MG tablet TAKE 1 TABLET (300MG) BY MOUTH AT BEDTIME   Rowena Haas APRN CNP   Semaglutide (OZEMPIC) 0.25 or 0.5 MG/DOSE SOPN Inject 0.25 mg Subcutaneous once a week for 28 days, THEN 0.5 mg once a week for 28 days.   Rowena Haas APRN CNP   senna-docusate (SENOKOT-S;PERICOLACE) 8.6-50 MG per tablet Take 1 tablet by mouth 2 times daily as needed for constipation   Kraig Pedersen MD   sertraline (ZOLOFT) 100 MG tablet Take 2 tablets (200 mg) by mouth daily   Kraig Pedersen MD   spacer (OPTICHAMBER PATRICIA) holding chamber Holding/spacer device to use with inhaler.   Rowena Haas APRN CNP   SUMAtriptan (IMITREX) 25 MG tablet Take 1-2 tablets (25-50 mg) by mouth at onset of headache for migraine May repeat in 2 hours. Max 8 tablets/24 hours.   Rowena Haas APRN CNP   topiramate (TOPAMAX) 50 MG tablet Take 1.5 tablets (75 mg) by mouth 2 times daily   Debbie Germain PA-C            Allergies:     Allergies   Allergen Reactions     Imidazole Antifungals Hives     Tolerates diflucan     Ketoprofen Itching     Pruritis to topical     Lisinopril Hives     Metformin Other (See Comments)     Patient hospitalized for lactic acidosis - admitting provider suspectd caused by metformin     Metronidazole Hives     Posaconazole Hives     Tolerates diflucan            Social History:     Social History     Tobacco Use     Smoking status: Never Smoker     Smokeless tobacco: Never Used   Substance Use Topics     Alcohol use: No     Frequency: Never     Comment: last month             Family History:     Family History   Problem Relation Age of Onset     Cancer Mother         BLADDER     Respiratory Mother          COPD     Gastrointestinal Disease Mother         CIRRHOSIS OF LI BOLIVAR     Alcohol/Drug Mother      Diabetes Mother      Hypertension Mother      Lipids Mother      C.A.D. Mother      Glaucoma Mother      Alcohol/Drug Sister      Mental Illness Sister      Alcohol/Drug Sister      Psychotic Disorder Sister      Cancer Maternal Grandmother         UNKNOWN TYPE     Cancer Brother         COLON     Cancer - colorectal Brother         IN HIS LATE 30S     Alcohol/Drug Brother          OF HEROIN OVERDOSE AT AGE 22 YRS     Macular Degeneration No family hx of               Review of Systems:   The 10 point Review of Systems is negative other than as noted in the HPI.           Physical Exam:   Blood pressure 141/73, pulse 90, temperature 97.9  F (36.6  C), resp. rate 13, weight 108 kg (238 lb), last menstrual period 2015, SpO2 93 %, not currently breastfeeding.  238 lbs 0 oz      GENERAL: Pleasant and cooperative. No acute distress.  EYES: Pupils equal and round. No scleral erythema or icterus.  ENT: External ears are normal without deformity. Posterior oropharynx is without erythem, swelling, or exudate.  NECK: Supple. No masses or swelling. No tenderness. Thyroid is normal without mass or tenderness.  CHEST: Clear to auscultation. Normal breath sounds. No retractions.   CV: Regular rate and rhythm. No JVD. Pulses normal.  ABDOMEN: Bowel sounds present. No tenderness. No masses or hernia.  EXTREMETIES: No clubbing, cyanosis, or ischemia.  SKIN: Warm and dry to touch. No wounds or rashes.  NEUROLOGIC: Strength and sensation are normal. Deep tendon reflexes are normal. Cranial nerves are normal.             Data:   All new lab and imaging data was reviewed.     Results for orders placed or performed during the hospital encounter of 05/15/19 (from the past 24 hour(s))   CBC with platelets differential   Result Value Ref Range    WBC 7.5 4.0 - 11.0 10e9/L    RBC Count 3.24 (L) 3.8 - 5.2 10e12/L    Hemoglobin 10.3  (L) 11.7 - 15.7 g/dL    Hematocrit 32.5 (L) 35.0 - 47.0 %     78 - 100 fl    MCH 31.8 26.5 - 33.0 pg    MCHC 31.7 31.5 - 36.5 g/dL    RDW 13.2 10.0 - 15.0 %    Platelet Count 158 150 - 450 10e9/L    Diff Method Automated Method     % Neutrophils 68.7 %    % Lymphocytes 22.2 %    % Monocytes 7.3 %    % Eosinophils 0.8 %    % Basophils 0.5 %    % Immature Granulocytes 0.5 %    Nucleated RBCs 0 0 /100    Absolute Neutrophil 5.1 1.6 - 8.3 10e9/L    Absolute Lymphocytes 1.7 0.8 - 5.3 10e9/L    Absolute Monocytes 0.6 0.0 - 1.3 10e9/L    Absolute Eosinophils 0.1 0.0 - 0.7 10e9/L    Absolute Basophils 0.0 0.0 - 0.2 10e9/L    Abs Immature Granulocytes 0.0 0 - 0.4 10e9/L    Absolute Nucleated RBC 0.0    Comprehensive metabolic panel   Result Value Ref Range    Sodium 137 133 - 144 mmol/L    Potassium 4.3 3.4 - 5.3 mmol/L    Chloride 107 94 - 109 mmol/L    Carbon Dioxide 23 20 - 32 mmol/L    Anion Gap 7 3 - 14 mmol/L    Glucose 370 (H) 70 - 99 mg/dL    Urea Nitrogen 18 7 - 30 mg/dL    Creatinine 0.93 0.52 - 1.04 mg/dL    GFR Estimate 67 >60 mL/min/[1.73_m2]    GFR Estimate If Black 78 >60 mL/min/[1.73_m2]    Calcium 8.5 8.5 - 10.1 mg/dL    Bilirubin Total <0.1 (L) 0.2 - 1.3 mg/dL    Albumin 3.2 (L) 3.4 - 5.0 g/dL    Protein Total 6.9 6.8 - 8.8 g/dL    Alkaline Phosphatase 86 40 - 150 U/L    ALT 33 0 - 50 U/L    AST 22 0 - 45 U/L   Troponin I   Result Value Ref Range    Troponin I ES <0.015 0.000 - 0.045 ug/L   Ketone Beta-Hydroxybutyrate Quantitative   Result Value Ref Range    Ketone Quantitative 0.1 0.0 - 0.6 mmol/L   Hemoglobin A1c   Result Value Ref Range    Hemoglobin A1C 7.6 (H) 0 - 5.6 %   Nt probnp inpatient   Result Value Ref Range    N-Terminal Pro BNP Inpatient 25 0 - 900 pg/mL   EKG 12-lead, tracing only   Result Value Ref Range    Interpretation ECG Click View Image link to view waveform and result    ISTAT gases lactate jocelyn POCT   Result Value Ref Range    Ph Venous 7.26 (L) 7.32 - 7.43 pH    PCO2 Venous  44 40 - 50 mm Hg    PO2 Venous 45 25 - 47 mm Hg    Bicarbonate Venous 20 (L) 21 - 28 mmol/L    O2 Sat Venous 74 %    Lactic Acid 2.9 (H) 0.7 - 2.1 mmol/L   ISTAT Basic Met ICa HCT POCT   Result Value Ref Range    Sodium 137 133 - 144 mmol/L    Potassium 4.2 3.4 - 5.3 mmol/L    Chloride 105 94 - 109 mmol/L    Total CO2 21 20 - 32 mmol/L    Anion Gap 11 6 - 17 mmol/L    Glucose 380 (H) 70 - 99 mg/dL    Urea Nitrogen 17 7 - 30 mg/dL    Creatinine 0.9 0.52 - 1.04 mg/dL    GFR Estimate 64 >60 mL/min/[1.73_m2]    GFR Estimate If Black 78 >60 mL/min/[1.73_m2]    Calcium Ionized 4.7 4.4 - 5.2 mg/dL    Hemoglobin 10.9 (L) 11.7 - 15.7 g/dL    Hematocrit - POCT 32 (L) 35.0 - 47.0 %PCV   UA with Microscopic   Result Value Ref Range    Color Urine Straw     Appearance Urine Clear     Glucose Urine >1000 (A) NEG^Negative mg/dL    Bilirubin Urine Negative NEG^Negative    Ketones Urine Negative NEG^Negative mg/dL    Specific Gravity Urine 1.021 1.003 - 1.035    Blood Urine Negative NEG^Negative    pH Urine 7.0 5.0 - 7.0 pH    Protein Albumin Urine Negative NEG^Negative mg/dL    Urobilinogen mg/dL Normal 0.0 - 2.0 mg/dL    Nitrite Urine Negative NEG^Negative    Leukocyte Esterase Urine Negative NEG^Negative    Source Midstream Urine     WBC Urine 5 0 - 5 /HPF    RBC Urine <1 0 - 2 /HPF    Squamous Epithelial /HPF Urine 1 0 - 1 /HPF   HCG qualitative urine (UPT)   Result Value Ref Range    HCG Qual Urine Negative NEG^Negative   CT Head w/o Contrast    Narrative    CT SCAN OF THE HEAD WITHOUT CONTRAST   5/15/2019 7:28 PM     HISTORY: Dizziness. Blurred vision.    TECHNIQUE:  Axial images of the head and coronal reformations without  IV contrast material. Radiation dose for this scan was reduced using  automated exposure control, adjustment of the mA and/or kV according  to patient size, or iterative reconstruction technique.    COMPARISON: Head CT 1/13/2018.    FINDINGS: There is no evidence of intracranial hemorrhage, mass,  acute  infarct or anomaly. The ventricles are normal in size, shape and  configuration. The brain parenchyma and subarachnoid spaces are  normal.     The visualized portions of the sinuses and mastoids appear normal. The  bony calvarium and bones of the skull base appear intact.       Impression    IMPRESSION: No evidence of acute intracranial hemorrhage, mass, or  herniation.      EDMUNDO GOMEZ MD   XR Chest 2 Views    Narrative    CHEST TWO VIEWS 5/15/2019 7:44 PM     HISTORY: Hyperglycemia, chest discomfort.    COMPARISON: March 30, 2019     FINDINGS: There are no acute infiltrates. The cardiac silhouette is  not enlarged. Pulmonary vasculature is unremarkable.      Impression    IMPRESSION: No acute disease.    ADELITA CHAPA MD   Lactic acid   Result Value Ref Range    Lactic Acid 2.2 (H) 0.4 - 2.0 mmol/L   Glucose by meter   Result Value Ref Range    Glucose 256 (H) 70 - 99 mg/dL     *Note: Due to a large number of results and/or encounters for the requested time period, some results have not been displayed. A complete set of results can be found in Results Review.

## 2019-05-16 NOTE — PLAN OF CARE
"PRIMARY DIAGNOSIS: \"Hyperglycemia  OUTPATIENT/OBSERVATION GOALS TO BE MET BEFORE DISCHARGE:  ADLs back to baseline: Yes    Activity and level of assistance: Up with standby assistance.    Pain status: Pain free.    Return to near baseline physical activity: Yes     Discharge Planner Nurse   Safe discharge environment identified: Yes  Barriers to discharge: No       Entered by: Parul PAIGE Che 05/16/2019 12:13 AM    Patient alert and oriented x4. VSS. Admitted from ED. SBA  able to walk to room.  Stress incontinence, has pull up on.  BG on arrival to [address].  Ate   box lunch sandwich.  Lives at [facility].  Expiratory wheezes with exhalation. Has sleep apnea but  explains she has not used CPAP for a long time. Intermittent o2 sats check. IV NS infusing at 75 ml/hr. Will continue to monitor  Please review provider order for any additional goals.   Nurse to notify provider when observation goals have been met and patient is ready for discharge.  "

## 2019-05-16 NOTE — PHARMACY-ADMISSION MEDICATION HISTORY
Admission medication history interview status for this patient is complete. See [facility] admission navigator for allergy information, prior to admission medications and immunization status.     Medication history interview source(s):Caregiver  Medication history resources (including written lists, pill bottles, clinic record):[facility]  Primary pharmacy:[facility] [phone]    Changes made to PTA medication list:  Added: -  Deleted: -  Changed: -    Actions taken by pharmacist (provider contacted, etc):None     Additional medication history information:None    Medication reconciliation/reorder completed by provider prior to medication history? No    Do you take OTC medications (eg tylenol, ibuprofen, fish oil, eye/ear drops, etc)? Y(Y/N)    For patients on insulin therapy: Y (Y/N)  Lantus/levemir/NPH/Mix 70/30 dose:   (Y/N) (see Med list for doses)     Prior to Admission medications    Medication Sig Last Dose Taking? Auth Provider   aspirin (ASA) 81 MG EC tablet TAKE 1 TABLET (81MG) BY MOUTH DAILY 5/15/2019 at am Yes Rebecca Carr DO   atorvastatin (LIPITOR) 80 MG tablet TAKE 1 TABLET (80MG) BY MOUTH DAILY 5/15/2019 at am Yes Rowena Haas APRN CNP   benztropine (COGENTIN) 0.5 MG tablet Take 2 tablets (1 mg) by mouth 2 times daily 5/15/2019 at am Yes Kraig Pedersen MD   calcium carbonate (OS-ALLEN) 1500 (600 Ca) MG tablet Take 3 tablets (1,800 mg) by mouth daily 5/15/2019 at am Yes Rowena Haas APRN CNP   cholecalciferol (VITAMIN D3) 70983 units (1250 mcg) capsule TAKE 1 CAPSULE (50,000 UNITS) MONTHLY 4/22/2019 at Unknown time Yes Rebecca Carr DO   gabapentin (NEURONTIN) 300 MG capsule Take 600 mg by mouth 2 times daily morning and afternoon 5/15/2019 at Unknown time Yes Unknown, Entered By History   gabapentin (NEURONTIN) 300 MG capsule Take 900 mg by mouth At Bedtime 5/14/2019 at Unknown time Yes Unknown, Entered By History   insulin glargine (LANTUS SOLOSTAR) 100 UNIT/ML pen Inject 48  Units Subcutaneous At Bedtime 5/14/2019 at pm Yes Rowena Haas APRN CNP   leflunomide (ARAVA) 20 MG tablet TAKE 1 TABLET (20 MG) BY MOUTH DAILY (LABS EVERY 8-12 WEEKS) 5/14/2019 at pm Yes Eladio Kwong MD   losartan (COZAAR) 50 MG tablet Take 1 tablet (50 mg) by mouth daily 5/15/2019 at am Yes Rebecca Carr DO   melatonin 5 MG CAPS Take 2 capsules by mouth At Bedtime 5/14/2019 at pm Yes Rebecca Carr DO   metoprolol succinate ER (TOPROL-XL) 25 MG 24 hr tablet Take 1 tablet (25 mg) by mouth daily 5/15/2019 at am Yes Rowena Haas APRN CNP   paliperidone ER (INVEGA) 9 MG 24 hr tablet Take 1 tablet (9 mg) by mouth every morning 5/15/2019 at am Yes Kraig Pedersen MD   polyethylene glycol (MIRALAX/GLYCOLAX) powder TAKE 8.5 GRAMS (1/2 CAPFUL) BY MOUTH EVERY OTHER DAY. 5/15/2019 at am Yes Rowena Haas APRN CNP   predniSONE (DELTASONE) 5 MG tablet Take 5 mg by mouth daily. 5/15/2019 at am Yes Unknown, Entered By History   ranitidine (ZANTAC) 300 MG tablet TAKE 1 TABLET (300MG) BY MOUTH AT BEDTIME 5/14/2019 at pm Yes Rowena Haas APRN CNP   Semaglutide (OZEMPIC) 0.25 or 0.5 MG/DOSE SOPN Inject 0.25 mg Subcutaneous once a week for 28 days, THEN 0.5 mg once a week for 28 days. 5/13/2019 at Unknown time Yes Rowena Haas APRN CNP   sertraline (ZOLOFT) 100 MG tablet Take 2 tablets (200 mg) by mouth daily 5/15/2019 at am Yes Kraig Pedersen MD   topiramate (TOPAMAX) 50 MG tablet Take 1.5 tablets (75 mg) by mouth 2 times daily 5/15/2019 at am Yes Debbie Germain PA-C   acetaminophen (TYLENOL) 500 MG tablet Take 2 tablets (1,000 mg) by mouth 3 times daily as needed for mild pain Unknown at Unknown time  Rowena Haas, APRN CNP   albuterol (PROAIR HFA/PROVENTIL HFA/VENTOLIN HFA) 108 (90 Base) MCG/ACT Inhaler Inhale 2 puffs into the lungs every 6 hours as needed for shortness of breath / dyspnea or wheezing Unknown at Unknown time  Wendy Tang, ART CNP   blood  glucose (NO BRAND SPECIFIED) test strip Use to test blood sugar  4 times daily or as directed. To accompany: Blood Glucose Monitor Brands: per insurance.   Rebecca Carr DO   Continuous Blood Gluc  (FREESTYLE GABBY 14 DAY READER) RO 1 Device 4 times daily For continuous glucose monitoring.   Wendy Tang APRN CNP   Continuous Blood Gluc Sensor (FREESTYLE GABBY 14 DAY SENSOR) MISC 1 Device every 14 days   Rebecca Carr DO   diclofenac (VOLTAREN) 1 % topical gel Place 4 g onto the skin 4 times daily as needed for moderate pain Unknown at Unknown time  Wendy Tang APRN CNP   glucose 4 G CHEW chewable tablet Take 2 every 15 minutes for blood sugar <70mg/dL. Recheck blood sugar every 15 minutes until above 70mg/dL, then eat a substantial meal. Unknown at Unknown time  Rowena Haas APRN CNP   guaiFENesin (ROBITUSSIN) 100 MG/5ML liquid Take 10 mLs (200 mg) by mouth every 4 hours as needed for cough Unknown at Unknown time  Wendy Tang APRN CNP   insulin aspart (NOVOLOG FLEXPEN) 100 UNIT/ML pen Inject 20 Units Subcutaneous 3 times daily (with meals) Once daily, can add additional 5 units if BGs are >500mg/dL.   Rowena Haas APRN CNP   insulin pen needle (NOVOFINE 30) 30G X 8 MM miscellaneous USE 4 DAILY OR AS DIRECTED   Rowena Haas APRN CNP   ipratropium - albuterol 0.5 mg/2.5 mg/3 mL (DUONEB) 0.5-2.5 (3) MG/3ML neb solution Take 1 vial (3 mLs) by nebulization every 6 hours as needed for shortness of breath / dyspnea or wheezing Unknown at Unknown time  Adriana Georges APRN CNP   order for DME Equipment being ordered: Depends.   Rowena Haas APRN CNP   order for DME Equipment being ordered: Freestyle Gabby [place]   Rebecca Carr DO   senna-docusate (SENOKOT-S;PERICOLACE) 8.6-50 MG per tablet Take 1 tablet by mouth 2 times daily as needed for constipation Unknown at Unknown time  Kraig Pedersen MD   spacer (OPTICHAMBER  PATRICIA) holding chamber Holding/spacer device to use with inhaler.   Rowena Haas APRN CNP   SUMAtriptan (IMITREX) 25 MG tablet Take 1-2 tablets (25-50 mg) by mouth at onset of headache for migraine May repeat in 2 hours. Max 8 tablets/24 hours. Unknown at Unknown time  Rowena Haas APRN CNP

## 2019-05-16 NOTE — PLAN OF CARE
"PRIMARY DIAGNOSIS: \"Hyperglycemia  OUTPATIENT/OBSERVATION GOALS TO BE MET BEFORE DISCHARGE:  1. ADLs back to baseline: Yes    2. Activity and level of assistance: Up with standby assistance.    3. Pain status: Pain free.    4. Return to near baseline physical activity: Yes     Discharge Planner Nurse   Safe discharge environment identified: Yes  Barriers to discharge: No       Entered by: Parul PAIGE Che 05/16/2019 6:09 AM    Patient alert and oriented x4. VSS. SBA  able to walk to room.  Stress incontinence, has pull up on.  BG on arrival to unit 189.  Ate   box lunch sandwich.  BG at 0200 275    IV NS infusing at 75 ml/hr. Will continue to monitor  Please review provider order for any additional goals.   Nurse to notify provider when observation goals have been met and patient is ready for discharge.  "

## 2019-05-17 ENCOUNTER — OFFICE VISIT (OUTPATIENT)
Dept: PALLIATIVE MEDICINE | Facility: CLINIC | Age: 59
End: 2019-05-17
Payer: MEDICARE

## 2019-05-17 ENCOUNTER — TELEPHONE (OUTPATIENT)
Dept: FAMILY MEDICINE | Facility: CLINIC | Age: 59
End: 2019-05-17

## 2019-05-17 DIAGNOSIS — G89.4 CHRONIC PAIN SYNDROME: Primary | ICD-10-CM

## 2019-05-17 PROCEDURE — 97110 THERAPEUTIC EXERCISES: CPT | Mod: GP | Performed by: PHYSICAL THERAPIST

## 2019-05-17 NOTE — TELEPHONE ENCOUNTER
Call from Staff at , post hospital appt made, per jermain she is doing well & they & pt have no concerns or questions regarding post hospital instructions.  Wendy Schwartz RN

## 2019-05-17 NOTE — PROGRESS NOTES
PAIN PHYSICAL THERAPY PROGRESS NOTE  Patient Name: Nena Tang      YOB: 1960     Medical Record Number: [id number]  Diagnosis: Chronic pain syndrome    Visit: 2/4  Chief Complaint: Bilateral shoulder pain, L > R   Per Dr. Beltran 4/12/2019: Presents with acute bilateral shoulder pain with significant myofascial component  Exam is non-focal. Low suspicion for rotator cuff tearing  Recommend pain physical therapy to address myofascial pain which is most causative  Early frozen shoulder could also be causing some decreased range of motion  Need to be sure she can manage blood sugars before doing a cortisone injection.   Would start with therapy first and if not improving can proceed with injection and would recommend one shoulder at a time.  Follow-up if pain is not improved after 4 therapy visits for cortisone injection.     Patient's goals for physical therapy: being able to use arms for reaching to get into van, dressing, bathing/grooming and sleep     Subjective: Patient reports she was in the hospital yesterday with elevated blood sugars.  States they took her off prednisone while in the hospital.  She is feeling tired with increased shoulder pain.    Objective Findings:  OBSERVATION: pt appears more fatigued yet motivated to learn HEP  Instructed in HEP:  --table top reaching  --cane AROM in all directions  --butterfly stretch  --towel stretch      Treatment Interventions:  Therapeutic Procedures/Exercises: for 30 minutes as above.  __________________________________________________________________    Assessment:  Ongoing Functional Limitations Include:  Patient tolerated/responded well to treatment    Intensity Level: 3 (1=low intensity; 5=high intensity)  Demonstrates/Verbalizes Technique: 5 (1= poor technique-difficulty performing exercises,significant cues required; 5= good technique-performs exercises without cues)  Body Awareness: 3 (1=low awareness; 5=high awareness)  Posture/Stability:  3 (1= poor posture, stability; 5= good posture, stability)  Motivational Level: Cooperative  Response to Teaching: cooperative  Factors that affect learning: None    _______________________________________________________________________  Plan of Care  Continue PT to support reactivation and integration of self regulation pain management skills;  Continue with prescribed plan of care - progress as tolerated.  Focus next session will be on: add wall climbs, pulleys, scapular retraction, pectoralis, scapular retraction     Present:  NA     Total Visit Time:  30 minutes    Therapist: Carmen Chamorro PT             Date: 5/17/2019

## 2019-05-19 NOTE — DISCHARGE SUMMARY
[facility]  Hospitalist Discharge Summary       Date of Admission:  5/15/2019  Date of Discharge:  5/16/2019 10:49 AM  Discharging Provider: Jordon Fair MD      Discharge Diagnoses      Type 2 diabetes with hyperglycemia   Dehydration due to glycosuric diuresis from hyperglycemia        Follow-ups Needed After Discharge   Follow-up Appointments     Follow-up and recommended labs and tests       Follow up with primary care provider, Rowena Haas, within 7   days for hospital follow- up.  The following labs/tests are recommended:   Blood sugar review and recommendation   See your endocrinologist as well in 2 weeks .             Discharge Disposition   Discharged to home  Condition at discharge: Stable    Hospital Course     Nena Tang is a 59-year-old female with history of type 2 diabetes, uterine cancer, Takotsubo cardiomyopathy, schizoaffective disorder, obstructive sleep apnea for which she uses CPAP, depression, migraines, herbal bowel syndrome, hypertension, hypercholesterolemia, coronary artery disease, and chronic low back pain.  She presented to the emergency department this evening for evaluation of high blood sugar.  Earlier in the day she felt dizzy and developed blurred vision.  She was thirsty and urinating frequently.  She checked her blood sugar and found it to be over 500.  She came to the emergency department for evaluation.  Blood sugar was elevated at 380.  Vital signs were stable.  Lactic acid was elevated at 2.9. CBC, basic metabolic panel, liver function tests, ionized calcium, troponin, pregnancy test, and urinalysis were unremarkable.  Chest x-ray showed no acute process.  CT of head showed no acute process.  She was given IV fluids with improvement of blood sugar.   Patient was hydrated and blood glucose improved and patient was stable on discharge.  Patient lives at group [place] and staff at group home stated that she has supervision with insulin regimen  and has integrated primary care service.        Consultations This Hospital Stay   PHARMACY IP CONSULT    Code Status   Full Code    Time Spent on this Encounter   I, Jordon Fair, personally saw the patient today and spent less than or equal to 30 minutes discharging this patient.       Jordon Fair MD  [facility]  ______________________________________________________________________    Physical Exam   Vital Signs:                    Weight: 242 lbs 6.4 oz         Primary Care Physician   Rowena Haas    Discharge Orders      Reason for your hospital stay    Uncontrolled diabetes     Follow-up and recommended labs and tests     Follow up with primary care provider, Rowena Haas, within 7 days for hospital follow- up.  The following labs/tests are recommended: Blood sugar review and recommendation   See your endocrinologist as well in 2 weeks .     Activity    Your activity upon discharge: activity as tolerated     Full Code     Diet    Follow this diet upon discharge: Orders Placed This Encounter      Moderate Consistent CHO Diet       Significant Results and Procedures   Most Recent 3 CBC's:  Recent Labs   Lab Test 05/15/19  1834 05/15/19  1822 03/30/19  1659 02/27/19  1218   WBC  --  7.5 9.0 6.9   HGB 10.9* 10.3* 10.3* 10.0*   MCV  --  100 102* 97   PLT  --  158 195 207     Most Recent 3 BMP's:  Recent Labs   Lab Test 05/16/19  0658 05/15/19  1834 05/15/19  1822 03/30/19  1659    137 137 139   POTASSIUM 4.1 4.2 4.3 4.8   CHLORIDE 110* 105 107 109   CO2 25  --  23 26   BUN 14 17 18 25   CR 0.86  --  0.93 1.28*   ANIONGAP 5 11 7 4   ALLEN 8.1*  --  8.5 9.0   * 380* 370* 135*     Most Recent 2 LFT's:  Recent Labs   Lab Test 05/15/19  1822 01/30/19  1215   AST 22 14   ALT 33 20   ALKPHOS 86 85   BILITOTAL <0.1* 0.2     Most Recent 3 INR's:  Recent Labs   Lab Test 11/11/16 2234 08/29/16  2235 05/29/14  0641   INR Unsatisfactory specimen - tube underfilled  SPOKE  WITH DR VARELA [id number], LY   0.86 1.02   ,   Results for orders placed or performed during the hospital encounter of 05/15/19   XR Chest 2 Views    Narrative    CHEST TWO VIEWS 5/15/2019 7:44 PM     HISTORY: Hyperglycemia, chest discomfort.    COMPARISON: March 30, 2019     FINDINGS: There are no acute infiltrates. The cardiac silhouette is  not enlarged. Pulmonary vasculature is unremarkable.      Impression    IMPRESSION: No acute disease.    ADELITA CHAPA MD   CT Head w/o Contrast    Narrative    CT SCAN OF THE HEAD WITHOUT CONTRAST   5/15/2019 7:28 PM     HISTORY: Dizziness. Blurred vision.    TECHNIQUE:  Axial images of the head and coronal reformations without  IV contrast material. Radiation dose for this scan was reduced using  automated exposure control, adjustment of the mA and/or kV according  to patient size, or iterative reconstruction technique.    COMPARISON: Head CT 1/13/2018.    FINDINGS: There is no evidence of intracranial hemorrhage, mass, acute  infarct or anomaly. The ventricles are normal in size, shape and  configuration. The brain parenchyma and subarachnoid spaces are  normal.     The visualized portions of the sinuses and mastoids appear normal. The  bony calvarium and bones of the skull base appear intact.       Impression    IMPRESSION: No evidence of acute intracranial hemorrhage, mass, or  herniation.      EDMUNDO GOMEZ MD     *Note: Due to a large number of results and/or encounters for the requested time period, some results have not been displayed. A complete set of results can be found in Results Review.       Discharge Medications   Discharge Medication List as of 5/16/2019 10:20 AM      CONTINUE these medications which have CHANGED    Details   insulin aspart (NOVOLOG FLEXPEN) 100 UNIT/ML pen Inject 22 Units Subcutaneous 3 times daily (with meals) Once daily, can add additional 5 units if BGs are >500mg/dL., Disp-15 mL, R-11, E-Prescribe      insulin glargine (LANTUS  SOLOSTAR PEN) 100 UNIT/ML pen Inject 50 Units Subcutaneous At Bedtime, Disp-3 mL, R-11, E-Prescribe         CONTINUE these medications which have NOT CHANGED    Details   acetaminophen (TYLENOL) 500 MG tablet Take 2 tablets (1,000 mg) by mouth 3 times daily as needed for mild pain, Disp-100 tablet, R-3, E-Prescribe      albuterol (PROAIR HFA/PROVENTIL HFA/VENTOLIN HFA) 108 (90 Base) MCG/ACT Inhaler Inhale 2 puffs into the lungs every 6 hours as needed for shortness of breath / dyspnea or wheezing, Disp-1 Inhaler, R-0, E-PrescribePatient has no nebulizer machine until Monday 6-25-18      aspirin (ASA) 81 MG EC tablet TAKE 1 TABLET (81MG) BY MOUTH DAILY, Disp-30 tablet, R-3, E-Prescribe      atorvastatin (LIPITOR) 80 MG tablet TAKE 1 TABLET (80MG) BY MOUTH DAILY, Disp-30 tablet, R-11, E-Prescribe      benztropine (COGENTIN) 0.5 MG tablet Take 2 tablets (1 mg) by mouth 2 times daily, Disp-120 tablet, R-2, E-Prescribe      blood glucose (NO BRAND SPECIFIED) test strip Use to test blood sugar  4 times daily or as directed. To accompany: Blood Glucose Monitor Brands: per insurance., Disp-100 strip, R-11, E-Prescribe      calcium carbonate (OS-ALLEN) 1500 (600 Ca) MG tablet Take 3 tablets (1,800 mg) by mouth daily, Disp-180 tablet, R-3, E-Prescribe      cholecalciferol (VITAMIN D3) 26173 units (1250 mcg) capsule TAKE 1 CAPSULE (50,000 UNITS) MONTHLY, Disp-4 capsule, R-3, E-Prescribe      Continuous Blood Gluc  (FREESTYLE LEBRON 14 DAY READER) RO 1 Device 4 times daily For continuous glucose monitoring., Disp-1 Device, R-0, E-Prescribe      Continuous Blood Gluc Sensor (FREESTYLE LEBRON 14 DAY SENSOR) MISC 1 Device every 14 days, Disp-6 each, R-3, E-Prescribe      diclofenac (VOLTAREN) 1 % topical gel Place 4 g onto the skin 4 times daily as needed for moderate painDisp-1 Tube, [id number]      !! gabapentin (NEURONTIN) 300 MG capsule Take 600 mg by mouth 2 times daily morning and afternoon, Historical      !!  gabapentin (NEURONTIN) 300 MG capsule Take 900 mg by mouth At Bedtime, Historical      glucose 4 G CHEW chewable tablet Take 2 every 15 minutes for blood sugar <70mg/dL. Recheck blood sugar every 15 minutes until above 70mg/dL, then eat a substantial meal., Disp-20 tablet, R-1, E-Prescribe      guaiFENesin (ROBITUSSIN) 100 MG/5ML liquid Take 10 mLs (200 mg) by mouth every 4 hours as needed for cough, Disp-560 mL, R-1, E-Prescribe      insulin pen needle (NOVOFINE 30) 30G X 8 MM miscellaneous USE 4 DAILY OR AS DIRECTEDDisp-400 each, [id number]      ipratropium - albuterol 0.5 mg/2.5 mg/3 mL (DUONEB) 0.5-2.5 (3) MG/3ML neb solution Take 1 vial (3 mLs) by nebulization every 6 hours as needed for shortness of breath / dyspnea or wheezing, Disp-30 vial, R-0, E-Prescribe      leflunomide (ARAVA) 20 MG tablet TAKE 1 TABLET (20 MG) BY MOUTH DAILY (LABS EVERY 8-12 WEEKS), Disp-30 tablet, R-0, E-PrescribeRefill Too Soon      losartan (COZAAR) 50 MG tablet Take 1 tablet (50 mg) by mouth daily, Disp-90 tablet, R-3, E-PrescribeDose decrease      melatonin 5 MG CAPS Take 2 capsules by mouth At Bedtime, Disp-180 capsule, R-3, E-PrescribeUpdated instructions      metoprolol succinate ER (TOPROL-XL) 25 MG 24 hr tablet Take 1 tablet (25 mg) by mouth daily, Disp-90 tablet, R-1, E-Prescribe      !! order for DME Equipment being ordered: Depends.Disp-30 each, R-1, Local Print      !! order for DME Equipment being ordered: Freestyle Gabby ReaderDisp-1 Device, R-0, Local Print      paliperidone ER (INVEGA) 9 MG 24 hr tablet Take 1 tablet (9 mg) by mouth every morning, Disp-30 tablet, R-2, E-Prescribe      polyethylene glycol (MIRALAX/GLYCOLAX) powder TAKE 8.5 GRAMS (1/2 CAPFUL) BY MOUTH EVERY OTHER DAY., Disp-527 g, R-1, E-Prescribe      ranitidine (ZANTAC) 300 MG tablet TAKE 1 TABLET (300MG) BY MOUTH AT BEDTIME, Disp-90 tablet, R-1, E-Prescribe      Semaglutide (OZEMPIC) 0.25 or 0.5 MG/DOSE SOPN Inject 0.25 mg Subcutaneous once a  week for 28 days, THEN 0.5 mg once a week for 28 days., Disp-6 mL, R-1, E-PrescribeReplaces Trulicity      senna-docusate (SENOKOT-S;PERICOLACE) 8.6-50 MG per tablet Take 1 tablet by mouth 2 times daily as needed for constipation, Disp-100 tablet, R-1, E-Prescribe      sertraline (ZOLOFT) 100 MG tablet Take 2 tablets (200 mg) by mouth daily, Disp-60 tablet, R-2, E-Prescribe      spacer (OPTICHAMBER PATRICIA) holding chamber Holding/spacer device to use with inhaler., Disp-1 each, R-0, E-Prescribe      SUMAtriptan (IMITREX) 25 MG tablet Take 1-2 tablets (25-50 mg) by mouth at onset of headache for migraine May repeat in 2 hours. Max 8 tablets/24 hours., Disp-12 tablet, R-1, E-Prescribe      topiramate (TOPAMAX) 50 MG tablet Take 1.5 tablets (75 mg) by mouth 2 times daily, Disp-90 tablet, R-3, E-Prescribe       !! - Potential duplicate medications found. Please discuss with provider.      STOP taking these medications       predniSONE (DELTASONE) 5 MG tablet Comments:   Reason for Stopping:         predniSONE (DELTASONE) 5 MG tablet Comments:   Reason for Stopping:             Allergies   Allergies   Allergen Reactions     Imidazole Antifungals Hives     Tolerates diflucan     Ketoprofen Itching     Pruritis to topical     Lisinopril Hives     Metformin Other (See Comments)     Patient hospitalized for lactic acidosis - admitting provider suspectd caused by metformin     Metronidazole Hives     Posaconazole Hives     Tolerates diflucan

## 2019-05-21 ENCOUNTER — OFFICE VISIT (OUTPATIENT)
Dept: RHEUMATOLOGY | Facility: CLINIC | Age: 59
End: 2019-05-21
Payer: MEDICARE

## 2019-05-21 VITALS
SYSTOLIC BLOOD PRESSURE: 136 MMHG | BODY MASS INDEX: 47.32 KG/M2 | HEIGHT: 60 IN | WEIGHT: 241 LBS | DIASTOLIC BLOOD PRESSURE: 76 MMHG | OXYGEN SATURATION: 92 % | TEMPERATURE: 98.1 F | HEART RATE: 88 BPM

## 2019-05-21 DIAGNOSIS — M25.50 POLYARTHRALGIA: ICD-10-CM

## 2019-05-21 PROCEDURE — 99212 OFFICE O/P EST SF 10 MIN: CPT | Performed by: INTERNAL MEDICINE

## 2019-05-21 RX ORDER — LEFLUNOMIDE 20 MG/1
20 TABLET ORAL DAILY
Qty: 60 TABLET | Refills: 0 | Status: SHIPPED | OUTPATIENT
Start: 2019-05-21 | End: 2019-07-02

## 2019-05-21 ASSESSMENT — ROUTINE ASSESSMENT OF PATIENT INDEX DATA (RAPID3)
TOTAL RAPID3 SCORE: 12
RAPID3 INTERPRETATION: MODERATE 6.1-12.0

## 2019-05-21 ASSESSMENT — MIFFLIN-ST. JEOR: SCORE: 1594.67

## 2019-05-21 NOTE — NURSING NOTE
Rheumatology referral faxed to [facility] at [phone].    Sherly Tsai, CMA

## 2019-05-21 NOTE — NURSING NOTE
Chief Complaint   Patient presents with     RECHECK       Initial /76   Pulse 88   Temp 98.1  F (36.7  C) (Oral)   Ht 1.524 m (5')   Wt 109.3 kg (241 lb)   LMP 01/06/2015   SpO2 92%   BMI 47.07 kg/m   Estimated body mass index is 47.07 kg/m  as calculated from the following:    Height as of this encounter: 1.524 m (5').    Weight as of this encounter: 109.3 kg (241 lb).  Medication Reconciliation: complete    Have you ever seen a rheumatologist yes Who you When 4/9/19  Joint pain history  Onset: Patient is here for a follow up. Patient reports feels pain is about the same as last time. Is doing PT, which believes helps.  Involved joints: Shoulders  Pain scale:  4/10     Wakes the patient from sleep : No  Morning stiffness:Yes for 2 minutes  Meds used:leflunomide    Interim history  Since last visit:  1. Infections - No  2. New symptoms/medical problem - No  3. Any side effects from Rheum medications -none  3. ER visits/Hospitalizations/surgeries - Yes, 5/15/19 hyperglycemia  4. Last PCP visit: 4/30/19  Wt Readings from Last 4 Encounters:   05/21/19 109.3 kg (241 lb)   05/15/19 110 kg (242 lb 6.4 oz)   05/15/19 107.4 kg (236 lb 12.8 oz)   05/15/19 107.4 kg (236 lb 12.8 oz)     BP Readings from Last 3 Encounters:   05/21/19 136/76   05/16/19 150/60   05/15/19 108/60

## 2019-05-23 ENCOUNTER — MEDICAL CORRESPONDENCE (OUTPATIENT)
Dept: HEALTH INFORMATION MANAGEMENT | Facility: CLINIC | Age: 59
End: 2019-05-23

## 2019-05-23 ENCOUNTER — TELEPHONE (OUTPATIENT)
Dept: RHEUMATOLOGY | Facility: CLINIC | Age: 59
End: 2019-05-23

## 2019-05-23 NOTE — TELEPHONE ENCOUNTER
Patient was in 19 no lab work performed . Pt had past orders that  same day 2019 . Pt started new medication at that time and was to follow up in 6 weeks . Is there labs that need to be drawn ? If MD orders pt and RN can schedule. See office note dated 19. .Loreta Garcia RN

## 2019-05-23 NOTE — TELEPHONE ENCOUNTER
Hepatitis B and TB screening blood work needs to be completed. Please let patient know.   I have put the orders in. Please get them done at a nearby local [facility] lab.

## 2019-05-24 ENCOUNTER — OFFICE VISIT (OUTPATIENT)
Dept: FAMILY MEDICINE | Facility: CLINIC | Age: 59
End: 2019-05-24
Payer: MEDICARE

## 2019-05-24 VITALS
OXYGEN SATURATION: 96 % | WEIGHT: 241 LBS | HEART RATE: 90 BPM | RESPIRATION RATE: 16 BRPM | BODY MASS INDEX: 47.07 KG/M2 | TEMPERATURE: 97.6 F | SYSTOLIC BLOOD PRESSURE: 136 MMHG | DIASTOLIC BLOOD PRESSURE: 78 MMHG

## 2019-05-24 DIAGNOSIS — M06.4 INFLAMMATORY POLYARTHROPATHY (H): Primary | ICD-10-CM

## 2019-05-24 DIAGNOSIS — Z09 HOSPITAL DISCHARGE FOLLOW-UP: ICD-10-CM

## 2019-05-24 DIAGNOSIS — E03.2 HYPOTHYROIDISM DUE TO MEDICATION: ICD-10-CM

## 2019-05-24 DIAGNOSIS — E11.3299 TYPE 2 DIABETES MELLITUS WITH MILD NONPROLIFERATIVE RETINOPATHY WITHOUT MACULAR EDEMA, WITH LONG-TERM CURRENT USE OF INSULIN, UNSPECIFIED LATERALITY (H): ICD-10-CM

## 2019-05-24 DIAGNOSIS — Z79.4 TYPE 2 DIABETES MELLITUS WITH MILD NONPROLIFERATIVE RETINOPATHY WITHOUT MACULAR EDEMA, WITH LONG-TERM CURRENT USE OF INSULIN, UNSPECIFIED LATERALITY (H): ICD-10-CM

## 2019-05-24 LAB
ALBUMIN SERPL-MCNC: 3.3 G/DL (ref 3.4–5)
ALP SERPL-CCNC: 75 U/L (ref 40–150)
ALT SERPL W P-5'-P-CCNC: 30 U/L (ref 0–50)
ANION GAP SERPL CALCULATED.3IONS-SCNC: 12 MMOL/L (ref 3–14)
AST SERPL W P-5'-P-CCNC: 17 U/L (ref 0–45)
BASOPHILS # BLD AUTO: 0 10E9/L (ref 0–0.2)
BASOPHILS NFR BLD AUTO: 0.4 %
BILIRUB SERPL-MCNC: 0.1 MG/DL (ref 0.2–1.3)
BUN SERPL-MCNC: 14 MG/DL (ref 7–30)
CALCIUM SERPL-MCNC: 9.4 MG/DL (ref 8.5–10.1)
CHLORIDE SERPL-SCNC: 108 MMOL/L (ref 94–109)
CK SERPL-CCNC: 114 U/L (ref 30–225)
CO2 SERPL-SCNC: 19 MMOL/L (ref 20–32)
CREAT SERPL-MCNC: 0.87 MG/DL (ref 0.52–1.04)
CREAT UR-MCNC: 138 MG/DL
DIFFERENTIAL METHOD BLD: ABNORMAL
EOSINOPHIL # BLD AUTO: 0.1 10E9/L (ref 0–0.7)
EOSINOPHIL NFR BLD AUTO: 1.8 %
ERYTHROCYTE [DISTWIDTH] IN BLOOD BY AUTOMATED COUNT: 13.5 % (ref 10–15)
ERYTHROCYTE [SEDIMENTATION RATE] IN BLOOD BY WESTERGREN METHOD: 32 MM/H (ref 0–30)
GFR SERPL CREATININE-BSD FRML MDRD: 73 ML/MIN/{1.73_M2}
GLUCOSE SERPL-MCNC: 251 MG/DL (ref 70–99)
HCT VFR BLD AUTO: 32 % (ref 35–47)
HGB BLD-MCNC: 10.3 G/DL (ref 11.7–15.7)
LYMPHOCYTES # BLD AUTO: 1.4 10E9/L (ref 0.8–5.3)
LYMPHOCYTES NFR BLD AUTO: 27.5 %
MCH RBC QN AUTO: 32 PG (ref 26.5–33)
MCHC RBC AUTO-ENTMCNC: 32.2 G/DL (ref 31.5–36.5)
MCV RBC AUTO: 99 FL (ref 78–100)
MICROALBUMIN UR-MCNC: 6 MG/L
MICROALBUMIN/CREAT UR: 4.47 MG/G CR (ref 0–25)
MONOCYTES # BLD AUTO: 0.5 10E9/L (ref 0–1.3)
MONOCYTES NFR BLD AUTO: 10.5 %
NEUTROPHILS # BLD AUTO: 3 10E9/L (ref 1.6–8.3)
NEUTROPHILS NFR BLD AUTO: 59.8 %
PLATELET # BLD AUTO: 171 10E9/L (ref 150–450)
POTASSIUM SERPL-SCNC: 4 MMOL/L (ref 3.4–5.3)
PROT SERPL-MCNC: 7 G/DL (ref 6.8–8.8)
RBC # BLD AUTO: 3.22 10E12/L (ref 3.8–5.2)
SODIUM SERPL-SCNC: 139 MMOL/L (ref 133–144)
TSH SERPL DL<=0.005 MIU/L-ACNC: 1.62 MU/L (ref 0.4–4)
WBC # BLD AUTO: 4.9 10E9/L (ref 4–11)

## 2019-05-24 PROCEDURE — 84443 ASSAY THYROID STIM HORMONE: CPT | Performed by: NURSE PRACTITIONER

## 2019-05-24 PROCEDURE — 36415 COLL VENOUS BLD VENIPUNCTURE: CPT | Performed by: NURSE PRACTITIONER

## 2019-05-24 PROCEDURE — 85025 COMPLETE CBC W/AUTO DIFF WBC: CPT | Performed by: NURSE PRACTITIONER

## 2019-05-24 PROCEDURE — 82550 ASSAY OF CK (CPK): CPT | Performed by: NURSE PRACTITIONER

## 2019-05-24 PROCEDURE — 85652 RBC SED RATE AUTOMATED: CPT | Performed by: NURSE PRACTITIONER

## 2019-05-24 PROCEDURE — 80053 COMPREHEN METABOLIC PANEL: CPT | Performed by: NURSE PRACTITIONER

## 2019-05-24 PROCEDURE — 99495 TRANSJ CARE MGMT MOD F2F 14D: CPT | Performed by: NURSE PRACTITIONER

## 2019-05-24 PROCEDURE — 82043 UR ALBUMIN QUANTITATIVE: CPT | Performed by: NURSE PRACTITIONER

## 2019-05-24 NOTE — PROGRESS NOTES
SUBJECTIVE:                                                    Nena Tang is a 59 year old female who presents to clinic today for the following health issues:    Polyarthropathy: Patient reports that her pain has been increasing since she stopped taking prednisone. Patient is using tylenol for break through pain and it seems like it's working. Patient is only taking tylenol once per day in the evening. Patient denies use of any other medication to help with the pain.     Hospital Follow-up Visit:  Hospital/Nursing Home/IP Rehab Facility: [facility]  Date of Admission: 5/15/19  Date of Discharge: 5/16/19  Reason(s) for Admission: type 2 diabetes with hyperglycemia, dehydration due to glycosuric diuresis from hyperglycemia            Problems taking medications regularly:  None       Medication changes since discharge: None       Problems adhering to non-medication therapy:  None    Summary of hospitalization:  [facility] discharge summary reviewed.  Diagnostic Tests/Treatments reviewed.  Follow up needed: none  Other Healthcare Providers Involved in Patient s Care:         None  Update since discharge: stable.     Post Discharge Medication Reconciliation: discharge medications reconciled, continue medications without change.  Plan of care communicated with patient     Coding guidelines for this visit:  Type of Medical   Decision Making Face-to-Face Visit       within 7 Days of discharge Face-to-Face Visit        within 14 days of discharge   Moderate Complexity [id number]   High Complexity [id number]          Diabetes: Patient has records from the [facility] showing her blood sugars: average blood sugar over the past week was 334 with lowest in the 200's. Some improvement since the prednisone was discontinued.   Patient states that she [facility] at the drop [facility] center and they called the EMS because her blood sugar was in the 500's. Patient states that she took a dose of 35 units before EMS  arrival at the drop in center. Patient reports that prior to the high blood sugar, she had gone to [employer] and had a meal there and finished off with ice cream and fudge on top.     Group home is requesting a referral for endocrine.           Problems taking medications regularly: No    Medication side effects: none    Diet: low fat/cholesterol and diabetic    Social History     Tobacco Use     Smoking status: Never Smoker     Smokeless tobacco: Never Used   Substance Use Topics     Alcohol use: No     Frequency: Never     Comment: last month        Problem list and histories reviewed & adjusted, as indicated.  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 (H)     Illiterate     Irritable bowel syndrome     overweight - BMI >35     Takotsubo cardiomyopathy     CAD (coronary artery disease)     Restless legs syndrome (RLS)     CINDY (obstructive sleep apnea)- mild AHI 10.3     Verbal auditory hallucination     Chronic low back pain     Schizoaffective disorder, depressive type (H)     Migraine headache     HTN, goal below 140/90     Health Care Home     Lumbago     Cervicalgia     Cocaine abuse, episodic (H)     Suicidal ideation     Esophageal reflux     Mild nonproliferative diabetic retinopathy (H)     Tardive dyskinesia     Alcohol use     Left cataract     Falls frequently     History of uterine cancer     Psychophysiological insomnia     Dysuria     Asymptomatic postmenopausal status     Abdominal pain, right lower quadrant     Infectious encephalopathy     Non-intractable vomiting with nausea     Thoughts of self harm     Gastroenteritis     Posttraumatic stress disorder     Cervical cancer screening     Type 2 diabetes mellitus with stage 3 chronic kidney disease, with long-term current use of  insulin (H)     Positive AIDA (antinuclear antibody)     Hyperglycemia     Past Surgical History:   Procedure Laterality Date     C OOPHORECTORMY FOR MALIG, W/BX  1983    UTERINE     CATARACT IOL, RT/LT Bilateral 2017     COLONOSCOPY N/A 3/16/2017    Procedure: COLONOSCOPY;  Surgeon: Traci Gonzalez MD;  Location:  GI     Coronary CTA  5/21/2014     HYSTERECTOMY  1983    uterine cancer yearly pap's per provider.     LAPAROSCOPIC CHOLECYSTECTOMY  2008     PHACOEMULSIFICATION CLEAR CORNEA WITH STANDARD INTRAOCULAR LENS IMPLANT Left 5/5/2017    Procedure: PHACOEMULSIFICATION CLEAR CORNEA WITH STANDARD INTRAOCULAR LENS IMPLANT;  LEFT EYE PHACOEMULSIFICATION CLEAR CORNEA WITH STANDARD INTRAOCULAR LENS IMPLANT ;  Surgeon: Tyra Diaz MD;  Location:  [facility]     PHACOEMULSIFICATION CLEAR CORNEA WITH STANDARD INTRAOCULAR LENS IMPLANT Right 6/30/2017    Procedure: PHACOEMULSIFICATION CLEAR CORNEA WITH STANDARD INTRAOCULAR LENS IMPLANT;  RIGHT EYE PHACOEMULSIFICATION CLEAR CORNEA WITH STANDARD INTRAOCULAR LENS IMPLANT;  Surgeon: Tyra Diaz MD;  Location:  [facility]     RELEASE TRIGGER FINGER  10/11/2012    Left thumb. Procedure: RELEASE TRIGGER FINGER;  LEFT THUMB TRIGGER RELEASE;  Surgeon: Tay Langley MD;  Location:  [facility]     RELEASE TRIGGER FINGER Right 12/26/2016    Procedure: RELEASE TRIGGER FINGER;  Surgeon: Albino Castañeda MD;  Location:  OR       Social History     Tobacco Use     Smoking status: Never Smoker     Smokeless tobacco: Never Used   Substance Use Topics     Alcohol use: No     Frequency: Never     Comment: last month     Family History   Problem Relation Age of Onset     Cancer Mother         BLADDER     Respiratory Mother         COPD     Gastrointestinal Disease Mother         CIRRHOSIS OF LI BOLIVAR     Alcohol/Drug Mother      Diabetes Mother      Hypertension Mother      Lipids Mother      C.A.D. Mother      Glaucoma Mother      Alcohol/Drug Sister      Mental Illness Sister       Alcohol/Drug Sister      Psychotic Disorder Sister      Cancer Maternal Grandmother         UNKNOWN TYPE     Cancer Brother         COLON     Cancer - colorectal Brother         IN HIS LATE 30S     Alcohol/Drug Brother          OF HEROIN OVERDOSE AT AGE 22 YRS     Macular Degeneration No family hx of            Current Outpatient Medications   Medication Sig Dispense Refill     acetaminophen (TYLENOL) 500 MG tablet Take 2 tablets (1,000 mg) by mouth 3 times daily as needed for mild pain 100 tablet 3     albuterol (PROAIR HFA/PROVENTIL HFA/VENTOLIN HFA) 108 (90 Base) MCG/ACT Inhaler Inhale 2 puffs into the lungs every 6 hours as needed for shortness of breath / dyspnea or wheezing 1 Inhaler 0     aspirin (ASA) 81 MG EC tablet TAKE 1 TABLET (81MG) BY MOUTH DAILY 30 tablet 3     atorvastatin (LIPITOR) 80 MG tablet TAKE 1 TABLET (80MG) BY MOUTH DAILY 30 tablet 11     benztropine (COGENTIN) 0.5 MG tablet Take 2 tablets (1 mg) by mouth 2 times daily 120 tablet 2     blood glucose (NO BRAND SPECIFIED) test strip Use to test blood sugar  4 times daily or as directed. To accompany: Blood Glucose Monitor Brands: per insurance. 100 strip 11     calcium carbonate (OS-ALLEN) 1500 (600 Ca) MG tablet Take 3 tablets (1,800 mg) by mouth daily 180 tablet 3     cholecalciferol (VITAMIN D3) 60569 units (1250 mcg) capsule TAKE 1 CAPSULE (50,000 UNITS) MONTHLY 4 capsule 3     Continuous Blood Gluc  (DEXCOM G6 ) RO 1 Device 4 times daily As directed for continuous glucose monitoring 1 Device 0     Continuous Blood Gluc  (FREESTYLE LEBRON 14 DAY READER) RO 1 Device 4 times daily For continuous glucose monitoring. 1 Device 0     Continuous Blood Gluc Sensor (DEXCOM G6 SENSOR) MISC 1 Device every 10 days For continuous glucose monitoring 3 each 11     Continuous Blood Gluc Sensor (FREESTYLE LEBRON 14 DAY SENSOR) MISC 1 Device every 14 days 6 each 3     Continuous Blood Gluc Transmit (DEXCOM G6 TRANSMITTER)  MISC 1 Device every 3 months 1 each 3     diclofenac (VOLTAREN) 1 % topical gel Place 4 g onto the skin 4 times daily as needed for moderate pain 1 Tube 1     gabapentin (NEURONTIN) 300 MG capsule Take 600 mg by mouth 2 times daily morning and afternoon       gabapentin (NEURONTIN) 300 MG capsule Take 900 mg by mouth At Bedtime       glucose 4 G CHEW chewable tablet Take 2 every 15 minutes for blood sugar <70mg/dL. Recheck blood sugar every 15 minutes until above 70mg/dL, then eat a substantial meal. 20 tablet 1     guaiFENesin (ROBITUSSIN) 100 MG/5ML liquid Take 10 mLs (200 mg) by mouth every 4 hours as needed for cough 560 mL 1     insulin aspart (NOVOLOG FLEXPEN) 100 UNIT/ML pen Inject 22 Units Subcutaneous 3 times daily (with meals) Once daily, can add additional 5 units if BGs are >500mg/dL. 15 mL 11     insulin glargine (LANTUS SOLOSTAR PEN) 100 UNIT/ML pen Inject 50 Units Subcutaneous At Bedtime 3 mL 11     insulin pen needle (NOVOFINE 30) 30G X 8 MM miscellaneous USE 4 DAILY OR AS DIRECTED 400 each 0     ipratropium - albuterol 0.5 mg/2.5 mg/3 mL (DUONEB) 0.5-2.5 (3) MG/3ML neb solution Take 1 vial (3 mLs) by nebulization every 6 hours as needed for shortness of breath / dyspnea or wheezing 30 vial 0     leflunomide (ARAVA) 20 MG tablet Take 1 tablet (20 mg) by mouth daily 60 tablet 0     losartan (COZAAR) 50 MG tablet Take 1 tablet (50 mg) by mouth daily 90 tablet 3     melatonin 5 MG CAPS Take 2 capsules by mouth At Bedtime 180 capsule 3     metoprolol succinate ER (TOPROL-XL) 25 MG 24 hr tablet Take 1 tablet (25 mg) by mouth daily 90 tablet 1     order for DME Equipment being ordered: Depends. 30 each 1     order for DME Equipment being ordered: Freestyle [place] 1 Device 0     paliperidone ER (INVEGA) 9 MG 24 hr tablet Take 1 tablet (9 mg) by mouth every morning 30 tablet 2     polyethylene glycol (MIRALAX/GLYCOLAX) powder TAKE 8.5 GRAMS (1/2 CAPFUL) BY MOUTH EVERY OTHER DAY. 527 g 1     ranitidine  (ZANTAC) 300 MG tablet TAKE 1 TABLET (300MG) BY MOUTH AT BEDTIME 90 tablet 1     Semaglutide (OZEMPIC) 0.25 or 0.5 MG/DOSE SOPN Inject 0.25 mg Subcutaneous once a week for 28 days, THEN 0.5 mg once a week for 28 days. 6 mL 1     senna-docusate (SENOKOT-S;PERICOLACE) 8.6-50 MG per tablet Take 1 tablet by mouth 2 times daily as needed for constipation 100 tablet 1     sertraline (ZOLOFT) 100 MG tablet Take 2 tablets (200 mg) by mouth daily 60 tablet 2     spacer (OPTICHAMBER PATRICIA) holding chamber Holding/spacer device to use with inhaler. 1 each 0     SUMAtriptan (IMITREX) 25 MG tablet Take 1-2 tablets (25-50 mg) by mouth at onset of headache for migraine May repeat in 2 hours. Max 8 tablets/24 hours. 12 tablet 1     topiramate (TOPAMAX) 50 MG tablet Take 1.5 tablets (75 mg) by mouth 2 times daily 90 tablet 3     Allergies   Allergen Reactions     Imidazole Antifungals Hives     Tolerates diflucan     Ketoprofen Itching     Pruritis to topical     Lisinopril Hives     Metformin Other (See Comments)     Patient hospitalized for lactic acidosis - admitting provider suspectd caused by metformin     Metronidazole Hives     Posaconazole Hives     Tolerates diflucan     Recent Labs   Lab Test 05/16/19  0658 05/15/19  1834 05/15/19  1822  01/30/19  1215 12/20/18  1532  10/17/18  1531 08/06/18  1038  11/07/17  0801  06/27/17  1358  07/13/16  1350  07/14/15  1215   A1C  --   --  7.6*  --   --  6.4*  --   --  6.4*   < > 8.9*   < > 7.0*   < >  --    < > 9.1*   LDL  --   --   --   --   --   --   --   --  84  --   --   --  64  --  137*  --  78   HDL  --   --   --   --   --   --   --   --  46*  --   --   --  37*  --   --   --  39*   TRIG  --   --   --   --   --   --   --   --  215*  --   --   --  267*  --   --   --  151*   ALT  --   --  33  --  20  --   --  23  --    < > 23   < > 32   < >  --    < >  --    CR 0.86  --  0.93   < > 1.31* 0.99   < > 1.04  --    < > 0.88   < > 0.78   < >  --    < > 0.67   GFRESTIMATED 75 64 67   < >  45* 63   < > 54*  --    < > 66   < > 76   < >  --    < > >90  Non  GFR Calc     GFRESTBLACK 86 78 78   < > 52* 73   < > 66  --    < > 80   < > >90   GFR Calc     < >  --    < > >90   GFR Calc     POTASSIUM 4.1 4.2 4.3   < > 4.7 4.2   < > 4.3  --    < > 3.9   < > 4.3   < >  --    < > 4.3   TSH  --   --   --   --   --  1.98  --   --   --   --  3.21  --   --    < >  --    < >  --     < > = values in this interval not displayed.      BP Readings from Last 3 Encounters:   05/21/19 136/76   05/16/19 150/60   05/15/19 108/60    Wt Readings from Last 3 Encounters:   05/21/19 109.3 kg (241 lb)   05/15/19 110 kg (242 lb 6.4 oz)   05/15/19 107.4 kg (236 lb 12.8 oz)        Labs reviewed in EPIC  Problem list, Medication list, Allergies, and Medical/Social/Surgical histories reviewed in [facility] and updated as appropriate.     ROS: Constitutional, neuro, ENT, endocrine, pulmonary, cardiac, gastrointestinal, genitourinary, musculoskeletal, integument and psychiatric systems are negative, except as otherwise noted above in the HPI.     OBJECTIVE:                                                    /78   Pulse 90   Temp 97.6  F (36.4  C)   Resp 16   Wt 109.3 kg (241 lb)   LMP 01/06/2015   SpO2 96%   BMI 47.07 kg/m    Body mass index is 47.07 kg/m .    GENERAL: healthy, alert and no distress  NECK: no adenopathy, no asymmetry, masses, or scars and thyroid normal to palpation  RESP: lungs clear to auscultation - no rales, rhonchi or wheezes  CV: regular rate and rhythm, normal S1 S2, no S3 or S4, no murmur, click or rub, no peripheral edema and peripheral pulses strong  MS: no gross musculoskeletal defects noted, no edema  NEURO: Normal strength and tone, mentation intact and speech normal    Mental Status Assessment:  Appearance:   Appropriate   Eye Contact:   Good   Psychomotor Behavior: Normal   Attitude:   Cooperative   Orientation:   All  Speech   Rate / Production: Normal     Volume:  Normal   Mood:    Normal  Affect:    Flat   Thought Content:  Clear   Thought Form:  Coherent  Logical   Insight:    Fair   Attention Span and Concentration: appropriate  Recent and Remote Memory:  intact  Fund of Knowledge: appropriate  Muscle Strength and Tone: normal   Suicidal Ideation: reports no thoughts, no intention  Hallucination: Yes  Paranoid-No  Manic-No  Panic-No  Self harm-No    Diagnostic Test Results:  Results for orders placed or performed in visit on 05/24/19   ESR: Erythrocyte sedimentation rate   Result Value Ref Range    Sed Rate 32 (H) 0 - 30 mm/h   CK total   Result Value Ref Range    CK Total 114 30 - 225 U/L   Comprehensive metabolic panel (BMP + Alb, Alk Phos, ALT, AST, Total. Bili, TP)   Result Value Ref Range    Sodium 139 133 - 144 mmol/L    Potassium 4.0 3.4 - 5.3 mmol/L    Chloride 108 94 - 109 mmol/L    Carbon Dioxide 19 (L) 20 - 32 mmol/L    Anion Gap 12 3 - 14 mmol/L    Glucose 251 (H) 70 - 99 mg/dL    Urea Nitrogen 14 7 - 30 mg/dL    Creatinine 0.87 0.52 - 1.04 mg/dL    GFR Estimate 73 >60 mL/min/[1.73_m2]    GFR Estimate If Black 85 >60 mL/min/[1.73_m2]    Calcium 9.4 8.5 - 10.1 mg/dL    Bilirubin Total 0.1 (L) 0.2 - 1.3 mg/dL    Albumin 3.3 (L) 3.4 - 5.0 g/dL    Protein Total 7.0 6.8 - 8.8 g/dL    Alkaline Phosphatase 75 40 - 150 U/L    ALT 30 0 - 50 U/L    AST 17 0 - 45 U/L   CBC with platelets and differential   Result Value Ref Range    WBC 4.9 4.0 - 11.0 10e9/L    RBC Count 3.22 (L) 3.8 - 5.2 10e12/L    Hemoglobin 10.3 (L) 11.7 - 15.7 g/dL    Hematocrit 32.0 (L) 35.0 - 47.0 %    MCV 99 78 - 100 fl    MCH 32.0 26.5 - 33.0 pg    MCHC 32.2 31.5 - 36.5 g/dL    RDW 13.5 10.0 - 15.0 %    Platelet Count 171 150 - 450 10e9/L    % Neutrophils 59.8 %    % Lymphocytes 27.5 %    % Monocytes 10.5 %    % Eosinophils 1.8 %    % Basophils 0.4 %    Absolute Neutrophil 3.0 1.6 - 8.3 10e9/L    Absolute Lymphocytes 1.4 0.8 - 5.3 10e9/L    Absolute Monocytes 0.5 0.0 - 1.3 10e9/L     Absolute Eosinophils 0.1 0.0 - 0.7 10e9/L    Absolute Basophils 0.0 0.0 - 0.2 10e9/L    Diff Method Automated Method    TSH with free T4 reflex   Result Value Ref Range    TSH 1.62 0.40 - 4.00 mU/L   Albumin Random Urine Quantitative with Creat Ratio   Result Value Ref Range    Creatinine Urine 138 mg/dL    Albumin Urine mg/L 6 mg/L    Albumin Urine mg/g Cr 4.47 0 - 25 mg/g Cr     *Note: Due to a large number of results and/or encounters for the requested time period, some results have not been displayed. A complete set of results can be found in Results Review.        ASSESSMENT/PLAN:                                                    (M06.4) Inflammatory polyarthropathy (H)  (primary encounter diagnosis)  Comment: Patient still struggling with some pain and with decreased shoulder range of motion due to pain.   Plan: ESR: Erythrocyte sedimentation rate, CK total,         Comprehensive metabolic panel (BMP + Alb, Alk         Phos, ALT, AST, Total. Bili, TP), CBC with         platelets and differential, TSH with free T4         reflex        -Continue with rheumatology as recommended.   -Use tylenol up to three times per day for the pain.     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   Plan: ENDOCRINOLOGY ADULT REFERRAL, insulin aspart         (NOVOLOG FLEXPEN) 100 UNIT/ML pen, insulin         glargine (LANTUS SOLOSTAR PEN) 100 UNIT/ML pen,        Comprehensive metabolic panel (BMP + Alb, Alk         Phos, ALT, AST, Total. Bili, TP), CBC with         platelets and differential, Albumin Random         Urine Quantitative with Creat Ratio        -Follow-up with endocrine.   -Increase insulin as discussed.     (Z09) Hospital discharge follow-up  Comment: as noted above.   Plan: Continue with plan of care as discussed.     (E03.2) Hypothyroidism due to  medication  Comment: Routine screening.   Plan: TSH with free T4 reflex        -Will discuss results at the next visit.     Patient Instructions   -Take as needed tylenol in the morning and at night time, can take extra dose during the day.   -Increase Lantus to 55 units at bedtime.   -Increase Novolog to 25 units with meals.   -Labs today.   -Follow up with Endocrine as scheduled and Eugenia (pharmacy).       I spent Greater than 40 minutes was spent face to face with Nena Tang, with greater than 50 % in counselling and consultation about Hospital follow-up, Diabetes, Inflammatory polyarthropathy.     ART [facility]

## 2019-05-24 NOTE — PATIENT INSTRUCTIONS
-Take as needed tylenol in the morning and at night time, can take extra dose during the day.   -Increase Lantus to 55 units at bedtime.   -Increase Novolog to 25 units with meals.   -Labs today.   -Follow up with Endocrine as scheduled and Eugenia (pharmacy)

## 2019-05-29 ENCOUNTER — HOSPITAL ENCOUNTER (EMERGENCY)
Facility: CLINIC | Age: 59
Discharge: HOME | End: 2019-05-29
Attending: EMERGENCY MEDICINE | Admitting: EMERGENCY MEDICINE
Payer: MEDICARE

## 2019-05-29 VITALS
OXYGEN SATURATION: 95 % | RESPIRATION RATE: 16 BRPM | DIASTOLIC BLOOD PRESSURE: 58 MMHG | TEMPERATURE: 98.3 F | HEART RATE: 75 BPM | SYSTOLIC BLOOD PRESSURE: 111 MMHG

## 2019-05-29 DIAGNOSIS — S39.012A BACK STRAIN, INITIAL ENCOUNTER: ICD-10-CM

## 2019-05-29 LAB
ALBUMIN SERPL-MCNC: 3.4 G/DL (ref 3.4–5)
ALBUMIN UR-MCNC: 10 MG/DL
ALP SERPL-CCNC: 97 U/L (ref 40–150)
ALT SERPL W P-5'-P-CCNC: 32 U/L (ref 0–50)
ANION GAP SERPL CALCULATED.3IONS-SCNC: 8 MMOL/L (ref 3–14)
APPEARANCE UR: CLEAR
AST SERPL W P-5'-P-CCNC: 24 U/L (ref 0–45)
BACTERIA #/AREA URNS HPF: ABNORMAL /HPF
BILIRUB SERPL-MCNC: 0.1 MG/DL (ref 0.2–1.3)
BILIRUB UR QL STRIP: NEGATIVE
BUN SERPL-MCNC: 15 MG/DL (ref 7–30)
CALCIUM SERPL-MCNC: 8.8 MG/DL (ref 8.5–10.1)
CHLORIDE SERPL-SCNC: 107 MMOL/L (ref 94–109)
CO2 SERPL-SCNC: 23 MMOL/L (ref 20–32)
COLOR UR AUTO: ABNORMAL
CREAT SERPL-MCNC: 0.89 MG/DL (ref 0.52–1.04)
GFR SERPL CREATININE-BSD FRML MDRD: 71 ML/MIN/{1.73_M2}
GLUCOSE SERPL-MCNC: 172 MG/DL (ref 70–99)
GLUCOSE UR STRIP-MCNC: 100 MG/DL
HGB UR QL STRIP: NEGATIVE
KETONES UR STRIP-MCNC: NEGATIVE MG/DL
LEUKOCYTE ESTERASE UR QL STRIP: ABNORMAL
MUCOUS THREADS #/AREA URNS LPF: PRESENT /LPF
NITRATE UR QL: NEGATIVE
NT-PROBNP SERPL-MCNC: 67 PG/ML (ref 0–900)
PH UR STRIP: 6 PH (ref 5–7)
POTASSIUM SERPL-SCNC: 4 MMOL/L (ref 3.4–5.3)
PROT SERPL-MCNC: 7.4 G/DL (ref 6.8–8.8)
RBC #/AREA URNS AUTO: 1 /HPF (ref 0–2)
SODIUM SERPL-SCNC: 138 MMOL/L (ref 133–144)
SOURCE: ABNORMAL
SP GR UR STRIP: 1.02 (ref 1–1.03)
SQUAMOUS #/AREA URNS AUTO: 8 /HPF (ref 0–1)
UROBILINOGEN UR STRIP-MCNC: NORMAL MG/DL (ref 0–2)
WBC #/AREA URNS AUTO: 3 /HPF (ref 0–5)

## 2019-05-29 PROCEDURE — 83880 ASSAY OF NATRIURETIC PEPTIDE: CPT | Performed by: EMERGENCY MEDICINE

## 2019-05-29 PROCEDURE — 87086 URINE CULTURE/COLONY COUNT: CPT | Performed by: EMERGENCY MEDICINE

## 2019-05-29 PROCEDURE — 81001 URINALYSIS AUTO W/SCOPE: CPT | Performed by: EMERGENCY MEDICINE

## 2019-05-29 PROCEDURE — 80053 COMPREHEN METABOLIC PANEL: CPT | Performed by: EMERGENCY MEDICINE

## 2019-05-29 PROCEDURE — 25000132 ZZH RX MED GY IP 250 OP 250 PS 637: Mod: GY | Performed by: EMERGENCY MEDICINE

## 2019-05-29 PROCEDURE — 99283 EMERGENCY DEPT VISIT LOW MDM: CPT

## 2019-05-29 RX ORDER — LIDOCAINE 4 G/G
1 PATCH TOPICAL ONCE
Status: DISCONTINUED | OUTPATIENT
Start: 2019-05-29 | End: 2019-05-30 | Stop reason: HOSPADM

## 2019-05-29 RX ORDER — LIDOCAINE 50 MG/G
1 PATCH TOPICAL EVERY 24 HOURS
Qty: 10 PATCH | Refills: 0 | Status: ON HOLD | OUTPATIENT
Start: 2019-05-29 | End: 2019-07-31

## 2019-05-29 RX ADMIN — LIDOCAINE 1 PATCH: 560 PATCH PERCUTANEOUS; TOPICAL; TRANSDERMAL at 22:32

## 2019-05-29 ASSESSMENT — ENCOUNTER SYMPTOMS
FLANK PAIN: 1
CHILLS: 0
SHORTNESS OF BREATH: 0
FEVER: 0

## 2019-05-29 NOTE — ED AVS SNAPSHOT
[facility] Emergency Department  [address]  [facility] [address]  Phone:  [phone]  Fax:  [phone]                                    Nena Tang   MRN: [id number]    Department:  [facility] Emergency Department   Date of Visit:  5/29/2019           After Visit Summary Signature Page    I have received my discharge instructions, and my questions have been answered. I have discussed any challenges I see with this plan with the nurse or doctor.    ..........................................................................................................................................  Patient/Patient Representative Signature      ..........................................................................................................................................  Patient Representative Print Name and Relationship to Patient    ..................................................               ................................................  Date                                   Time    ..........................................................................................................................................  Reviewed by Signature/Title    ...................................................              ..............................................  Date                                               Time          22EPIC Rev 08/18

## 2019-05-30 NOTE — ED PROVIDER NOTES
History     Chief Complaint:  Flank Pain    HPI   Nena Tang is a 59 year old female who presents to the emergency department today for evaluation of flank pain. The patient states she has been  experiencing right sided flank pain and urinary urgency for a few days. The patient denies fevers and chills. The patient also endorses bilateral leg swelling, which is new as of a few days ago. The patient states Tylenol helps her pain, but not significantly.      Allergies:  Imidazole Antifungals  Ketoprofen  Lisinopril  Metformin  Metronidazole  Posaconazole     Medications:    Albuterol   Atorvastatin  Aspirin  Cogentin  Diclofenac  Gabapentin  Novolog  Lantus  Duoneb  Metoprolol  Leflunomide  Losartan  Sertraline  Imitrex  Topamax     Past Medical History:    Acute respiratory failure with hypoxia   CAD (coronary artery disease)   Chronic low back pain   Cocaine abuse, in remission    Fecal urgency   History of heroin abuse   Hyperlipidemia  Hypertension   Illiterate   Irritable bowel syndrome   Left cataract   Migraine   Moderate major depression   Noncompliance with medication regimen   Obesity   CINDY (obstructive sleep apnea)   Osteopenia   Pneumonia of right lower lobe due to infectious organism   Schizoaffective disorder, depressive type   Sepsis   Suicidal intent   Takotsubo cardiomyopathy   Type 2 diabetes mellitus  Uterine cancer   Verbal auditory hallucination    Past Surgical History:    Hysterectomy   Coronary CTA   Release trigger finger   OOPHORECTORMY  Laparoscopic cholecystectomy   Release trigger finger   Colonoscopy  Phacoemulsification clear cornea with standard intraocular lens implant     Family History:    Mother: Cancer, COPD, Cirrhosis of liver, Alcohol/drug, Diabetes, Hypertension, Lipids, CAD, glaucoma  Sister: Alcohol/drug, Mental illness  Maternal Grandmother: Cancer  Brother: Cancer, Alcohol/drug    Social History:  The patient was accompanied to the ED by a friend.  Smoking Status:  Never Smoker  Smokeless Tobacco: Never Used  Drug use: Negative  Alcohol Use: Negative   Marital Status:       Review of Systems   Constitutional: Negative for chills and fever.   Respiratory: Negative for shortness of breath.    Genitourinary: Positive for flank pain and urgency.   All other systems reviewed and are negative.      Physical Exam     Patient Vitals for the past 24 hrs:   BP Temp Temp src Heart Rate Resp SpO2   05/29/19 2110 (!) 172/100 98.3  F (36.8  C) Oral 87 16 93 %      Physical Exam  Constitutional: Alert, attentive  HENT:    Nose: Nose normal.    Mouth/Throat: Oropharynx is clear, mucous membranes are moist  Eyes: EOM are normal, anicteric, conjugate gaze  CV: regular rate and rhythm; no murmurs  Chest: Effort normal and breath sounds clear without wheezing or rales, symmetric bilaterally   GI:  non tender. No distension. No guarding or rebound.    MSK: No LE edema, no tenderness to palpation of BLE.  Neurological: Alert, attentive, moving all extremities equally.   Skin: Skin is warm and dry.   Back: Mild mid right paraspinal muscle tenderness, totally reproducing pain    Emergency Department Course     Laboratory:  Laboratory findings were communicated with the patient who voiced understanding of the findings.    CMP: Glucose 172 (H), Bilirubin 0.1 (L) o/w WNL (Creatinine 0.89)  Nt probnp inpatient: 67    UA with micro: Glucose 100, Protein Albumin 10, Leukocyte esterase moderate, Bacteria few, Squamous Epithelial 8 (H), mucous present o/w negative   Urine Culture: Pending    Interventions:  2232 Lidocare 1 patch Transdermal    Emergency Department Course:    2143 The patient provided a urine sample here in the emergency department. This was sent for laboratory testing, findings above.     2216 Nursing notes and vitals reviewed.    2218 I performed an exam of the patient as documented above.     2226 IV was inserted and blood was drawn for laboratory testing, results above.     2319 I  personally discussed the laboratory results with the patient and answered all related questions prior to discharge    Impression & Plan      Medical Decision Making:  This patient presented with back pain.  The pain has improved with interventions in the emergency department.  The patient did not sustain any focal trauma, therefore x-rays are not necessary due to the low likelihood of fracture or subluxation. The patient has not had a fever, saddle/perineal anesthesia, bilateral foot numbness, or bowel or bladder dysfunction.  There is no clinical evidence of cauda equina syndrome, discitis, spinal/epidural space hematoma or abscess.  UA without UTI.  No concern for pyelo or ureteral stone given fully reproducible muscular tenderness on exam c/w strain.     The neurological exam is normal and the patient's symptoms are consistent with a musculoskeletal strain. There is no current evidence of radiculopathy or myelopathy. The patient will be discharged with pain medications to use as directed.      The patient was directed to avoid heavy lifting, bending or twisting. The patient was told to return for:  increasing pain, numbness, weakness, or bowel or bladder dysfunction.  The patient was advised to schedule follow-up with his/her primary doctor within 3 days to re-assess symptoms.    Diagnosis:    ICD-10-CM    1. Back strain, initial encounter S39.012A      Disposition:   The patient is discharged to home.     Discharge Medications:  START taking      Dose / Directions   lidocaine 5 % patch  Commonly known as:  LIDODERM      Dose:  1 patch  Place 1 patch onto the skin every 24 hours for 10 days  Quantity:  10 patch  Refills:  0      * This list has 4 medication(s) that are the same as other medications prescribed for you. Read the directions carefully, and ask your doctor or other care provider to review them with you.               Where to get your medicines      Some of these will need a paper prescription and  others can be bought over the counter. Ask your nurse if you have questions.    Bring a paper prescription for each of these medications    lidocaine 5 % patch        Bar Beltran MD   [employer]  6:42 AM 05/30/19     Scribe Disclosure:  I, Ora Hemphill, am serving as a scribe at 10:16 PM on 5/29/2019 to document services personally performed by Bar Beltran MD based on my observations and the provider's statements to me.       [facility] EMERGENCY DEPARTMENT       Bar Beltran MD  05/30/19 0642

## 2019-05-30 NOTE — ED TRIAGE NOTES
Pt c/o right flank pain and bilateral feet swelling that started 6 days ago and has been getting worse.

## 2019-05-31 ENCOUNTER — OFFICE VISIT (OUTPATIENT)
Dept: PALLIATIVE MEDICINE | Facility: CLINIC | Age: 59
End: 2019-05-31
Payer: MEDICARE

## 2019-05-31 DIAGNOSIS — G89.4 CHRONIC PAIN SYNDROME: Primary | ICD-10-CM

## 2019-05-31 LAB
BACTERIA SPEC CULT: NORMAL
Lab: NORMAL
SPECIMEN SOURCE: NORMAL

## 2019-05-31 PROCEDURE — 97110 THERAPEUTIC EXERCISES: CPT | Mod: GP | Performed by: PHYSICAL THERAPIST

## 2019-05-31 NOTE — PROGRESS NOTES
PAIN PHYSICAL THERAPY PROGRESS NOTE  Patient Name: Nena Tang      YOB: 1960     Medical Record Number: [id number]  Diagnosis: Chronic pain syndrome    Visit: 3/4  Chief Complaint: Bilateral shoulder pain, L > R   Per Dr. Beltran 4/12/2019: Presents with acute bilateral shoulder pain with significant myofascial component  Exam is non-focal. Low suspicion for rotator cuff tearing  Recommend pain physical therapy to address myofascial pain which is most causative  Early frozen shoulder could also be causing some decreased range of motion  Need to be sure she can manage blood sugars before doing a cortisone injection.   Would start with therapy first and if not improving can proceed with injection and would recommend one shoulder at a time.  Follow-up if pain is not improved after 4 therapy visits for cortisone injection.     Patient's goals for physical therapy: being able to use arms for reaching to get into van, dressing, bathing/grooming and sleep     Subjective: Patient reports she was in the ED 2 days ago due to back and shoulder pain. She is feeling tired with increased shoulder pain.  Reports increased shoulder pain with pushing herself out of a chair or the van; does better with pulling herself to change positions.  Unable to lie on treatment table as she is afraid she won't be able to get back up; requires grab bar for pulling herself up from lying down    Objective Findings:  OBSERVATION: pt appears more fatigued yet motivated to learn HEP  Instructed in HEP:  --table top reaching  --cane AROM in all directions  --butterfly stretch  --towel stretch  --chair squats x 5  --rowing YTB x 5    Instructed in chair mechanics without using arms to lift or lower self in chair.  Noted legs fatigue easily.    Instructed in upright posture in sitting to engage core musculature and reduce forward shoulder posture; significant postural fatigue  Instructed in anatomy and understanding of scapular  mobility, shoulder alignment and lower trap muscles needed for scapular stability; pt states she is a visual and hands-on learner  Instructed in scapular retraction/depression with yellow TB seated and standing rowing.    Treatment Interventions:  Therapeutic Procedures/Exercises: for 45 minutes as above.  __________________________________________________________________    Assessment:  Ongoing Functional Limitations Include:  Patient tolerated/responded well to treatment    Intensity Level: 3 (1=low intensity; 5=high intensity)  Demonstrates/Verbalizes Technique: 5 (1= poor technique-difficulty performing exercises,significant cues required; 5= good technique-performs exercises without cues)  Body Awareness: 3 (1=low awareness; 5=high awareness)  Posture/Stability: 3 (1= poor posture, stability; 5= good posture, stability)  Motivational Level: Cooperative  Response to Teaching: cooperative  Factors that affect learning: None    _______________________________________________________________________  Plan of Care  Continue PT to support reactivation and integration of self regulation pain management skills;  Continue with prescribed plan of care - progress as tolerated.  Focus next session will be on: add wall climbs, pulleys, monitor posture and scapular retraction; add shoulder extension YTB; consider nustep or side lying reach/roll     Present:  NA     Total Visit Time:  45 minutes    Therapist: Carmen Chamorro PT             Date: 5/31/2019

## 2019-05-31 NOTE — RESULT ENCOUNTER NOTE
Final urine culture report is NEGATIVE per [place] ED Lab Result protocol.    If NEGATIVE result, no change in treatment, per [place] ED Lab Result protocol.

## 2019-06-04 ENCOUNTER — OFFICE VISIT (OUTPATIENT)
Dept: BEHAVIORAL HEALTH | Facility: CLINIC | Age: 59
End: 2019-06-04
Payer: MEDICARE

## 2019-06-04 ENCOUNTER — OFFICE VISIT (OUTPATIENT)
Dept: PHARMACY | Facility: CLINIC | Age: 59
End: 2019-06-04
Payer: COMMERCIAL

## 2019-06-04 ENCOUNTER — OFFICE VISIT (OUTPATIENT)
Dept: FAMILY MEDICINE | Facility: CLINIC | Age: 59
End: 2019-06-04
Payer: MEDICARE

## 2019-06-04 VITALS
BODY MASS INDEX: 47.65 KG/M2 | HEART RATE: 94 BPM | SYSTOLIC BLOOD PRESSURE: 108 MMHG | DIASTOLIC BLOOD PRESSURE: 72 MMHG | TEMPERATURE: 97.2 F | WEIGHT: 244 LBS | RESPIRATION RATE: 18 BRPM | OXYGEN SATURATION: 94 %

## 2019-06-04 DIAGNOSIS — G89.29 CHRONIC PAIN OF BOTH SHOULDERS: ICD-10-CM

## 2019-06-04 DIAGNOSIS — Z79.4 TYPE 2 DIABETES MELLITUS WITH HYPERGLYCEMIA, WITH LONG-TERM CURRENT USE OF INSULIN (H): Primary | ICD-10-CM

## 2019-06-04 DIAGNOSIS — F25.1 SCHIZOAFFECTIVE DISORDER, DEPRESSIVE TYPE (H): ICD-10-CM

## 2019-06-04 DIAGNOSIS — F43.10 POSTTRAUMATIC STRESS DISORDER: ICD-10-CM

## 2019-06-04 DIAGNOSIS — Z13.820 SCREENING FOR OSTEOPOROSIS: ICD-10-CM

## 2019-06-04 DIAGNOSIS — Z79.4 TYPE 2 DIABETES MELLITUS WITH MILD NONPROLIFERATIVE RETINOPATHY WITHOUT MACULAR EDEMA, WITH LONG-TERM CURRENT USE OF INSULIN, UNSPECIFIED LATERALITY (H): Primary | ICD-10-CM

## 2019-06-04 DIAGNOSIS — Z78.0 ASYMPTOMATIC MENOPAUSAL STATE: ICD-10-CM

## 2019-06-04 DIAGNOSIS — M25.511 CHRONIC PAIN OF BOTH SHOULDERS: ICD-10-CM

## 2019-06-04 DIAGNOSIS — F14.10 COCAINE ABUSE, EPISODIC (H): ICD-10-CM

## 2019-06-04 DIAGNOSIS — R21 RASH AND NONSPECIFIC SKIN ERUPTION: ICD-10-CM

## 2019-06-04 DIAGNOSIS — E66.01 MORBID OBESITY (H): ICD-10-CM

## 2019-06-04 DIAGNOSIS — E11.65 TYPE 2 DIABETES MELLITUS WITH HYPERGLYCEMIA, WITH LONG-TERM CURRENT USE OF INSULIN (H): Primary | ICD-10-CM

## 2019-06-04 DIAGNOSIS — E11.3299 TYPE 2 DIABETES MELLITUS WITH MILD NONPROLIFERATIVE RETINOPATHY WITHOUT MACULAR EDEMA, WITH LONG-TERM CURRENT USE OF INSULIN, UNSPECIFIED LATERALITY (H): Primary | ICD-10-CM

## 2019-06-04 DIAGNOSIS — E66.9 OBESITY, UNSPECIFIED OBESITY SEVERITY, UNSPECIFIED OBESITY TYPE: ICD-10-CM

## 2019-06-04 DIAGNOSIS — R23.8 SKIN BREAKDOWN: ICD-10-CM

## 2019-06-04 DIAGNOSIS — M25.512 CHRONIC PAIN OF BOTH SHOULDERS: ICD-10-CM

## 2019-06-04 DIAGNOSIS — F25.1 SCHIZOAFFECTIVE DISORDER, DEPRESSIVE TYPE (H): Primary | ICD-10-CM

## 2019-06-04 PROCEDURE — 99607 MTMS BY PHARM ADDL 15 MIN: CPT | Performed by: PHARMACIST

## 2019-06-04 PROCEDURE — 90834 PSYTX W PT 45 MINUTES: CPT | Performed by: SOCIAL WORKER

## 2019-06-04 PROCEDURE — 99606 MTMS BY PHARM EST 15 MIN: CPT | Performed by: PHARMACIST

## 2019-06-04 PROCEDURE — 99215 OFFICE O/P EST HI 40 MIN: CPT | Performed by: NURSE PRACTITIONER

## 2019-06-04 RX ORDER — NYSTATIN 100000 [USP'U]/G
POWDER TOPICAL
Qty: 60 G | Refills: 3 | Status: ON HOLD | OUTPATIENT
Start: 2019-06-04 | End: 2019-12-21

## 2019-06-04 NOTE — PROGRESS NOTES
SUBJECTIVE/OBJECTIVE:                Nena Tang is a 59 year old female coming in for a follow-up visit for Medication Therapy Management.  She was referred to me from Rowena Haas with [facility]ena Haas and [facility].      Chief Complaint: Follow up from our visit on 5/7/19.  Hospital follow-up, discharged 5/16/19 for hyperglycemia. Skin irritation from sweating     Personal Healthcare Goals: Lose weight  Tobacco: No tobacco use  Alcohol: not currently using    Medication Adherence/Access: no issues reported  Group home staff sets up and administers medications, except when pt leaves group home     Shoulder pain: Taking leflunomide 20mg daily. Patient reports no side effects. Shoulder pain is improving.   -During 5/15/19 hospitalization for hyperglycemia, physician discontinued prednisone due to increased BG.   -ED visit 5/29/19 due to flank pain. Pt reports that during visit they applied lidocaine patch to her back and that is helped out greatly. Stated that an Rx was going to be sent for outpatient use.   -Has been going to physical therapy. Reports range of motion difficulties, but states that she is trying.     Weight management: currently taking topiramate 75mg BID and Ozempic 0.25mg once weekly (changed to from Trulicity on 5/7/19 per weight clinic recommendation). Pt reports stomach cramping and diarrhea for past 2 days, but nothing prior to this and pain with Ozempic injection.     Wt Readings from Last 4 Encounters:   06/04/19 244 lb (110.7 kg)   05/24/19 241 lb (109.3 kg)   05/21/19 241 lb (109.3 kg)   05/15/19 242 lb 6.4 oz (110 kg)     Schizoaffective: Currently taking sertraline 200mg daily, Invega 9mg daily, benztropine 1mg BID.   -Reports mood overall being up. However states that nothing good has happened to her in the past week except that she is still living. No suicidal thoughts.    -Upcoming visit to [place] to visit sister that is terminally ill; worried about how she will  feel/react    Diabetes: Pt currently taking lantus 55u at bedtime (increase from 48u), novolog 25u usually 2 times a day with meals, and semaglutide 0.25mg weekly (recently discontinued trulicity). Pt is not experiencing side effects.  SMBG: 3-4 times daily.   Ranges (from patient's group home glucose log):   Date FBG/ 2hours post Lunch/2hours post Dinner /2hours post   6/3 153     6/2 187 240 195/236   6/1 196 317 251/265   5/31 170 196 341/330   5/30 213  322/251   5/29 182  289/247   5/28 207 200 269/320     -DexCom Rx was sent in; pt reports that she has not heard anything back.   Patient is not experiencing hypoglycemia  Recent symptoms of high blood sugar? none  Eye exam: up to date  Foot exam: up to date  ACEi/ARB: No.   Urine Albumin:   Lab Results   Component Value Date    UMALCR 4.47 05/24/2019      Aspirin: Taking 81mg daily and denies side effects  Diet/Exercise: Eats at [employer] three times weekly (cheeseburger, chicken tenders, diet soda, or french fries. No more ice cream due to glucose elevation). Reports taking insulin prior to meal (25units). Increasing daily water intake and carries a water bottle with her throughout the day. Not exercising [facility]. Pt is currently not bringing walker with her.     Lab Results   Component Value Date    A1C 7.6 05/15/2019    A1C 6.4 12/20/2018    A1C 6.4 08/06/2018    A1C 6.2 05/22/2018    A1C 6.8 02/12/2018     Skin Irritation:  Pt reports sweating excessively underneath breasts and stomach with increased temperature, which causes her skin to breakout. States that staff has been putting a towel underneath her stomach but that it is uncomfortable for her. Inquiring about what else can be applied.     Today's Vitals:   BP Readings from Last 1 Encounters:   06/04/19 108/72     Pulse Readings from Last 1 Encounters:   06/04/19 94     Wt Readings from Last 1 Encounters:   06/04/19 244 lb (110.7 kg)     Ht Readings from Last 1 Encounters:   05/21/19 5' (1.524  m)     Estimated body mass index is 47.65 kg/m  as calculated from the following:    Height as of 5/21/19: 5' (1.524 m).    Weight as of an earlier encounter on 6/4/19: 244 lb (110.7 kg).    Temp Readings from Last 1 Encounters:   06/04/19 97.2  F (36.2  C)      LMP 01/06/2015     ASSESSMENT:              Current medications were reviewed today as discussed above.      Medication Adherence: good, no issues identified    Shoulder Pain: Improved. Since patient is not reporting increased pain after discontinuing prednisone and is attending PT, no changes advised at this time.     Weight management: Unimproved. Even though switching to Ozempic 1 month ago, pt's weight has increased by 2lbs. Patient would benefit from increasing Ozempic to 0.5mg weekly and increasing daily physical activity.      Schizoaffective: Improved. Continue current regimen     Diabetes: Needs improvement. Patient is not meeting goal of A1c <7%. Pt would benefit by increasing long-acting insulin. Additionally, Pt would also benefit from increasing Ozempic to 0.5mg weekly.   -Decrease frequency of going to [employer].     Skin Irritation: Needs improvement. Patient may benefit from placing a softer material underneath stomach or by applying Nystatin powder  to help dry skin      PLAN:                Post Discharge Medication Reconciliation Status: discharge medications reconciled and changed, per note/orders (see AVS).     -Follow-up with pharmacy on DexCom approval   -increase ozempic to 0.5mg  -increase Lantus to 60u at bedtime   -Start nystatin 316110 powder    I spent 30 minutes with this patient today. All changes were made via collaborative practice agreement with Rowena Haas. A copy of the visit note was provided to the patient's primary care provider.     Will follow up in 1-2 weeks by phone.    The patient was given a summary of these recommendations as an after visit summary.    Suzie Leary, PD4 Pharmacy Student   Eugenia De Jesus,  PharmD, BCACP

## 2019-06-04 NOTE — PROGRESS NOTES
[facility] (formerly [facility]) - Integrated Primary Care   June 4, 2019    Behavioral Health Clinician Progress Note    Patient Name: Nena Tang           Service Type:  Individual      Service Location:   Face to Face in Clinic     Session Start Time: 2pm  Session End Time: 2:40pm       Session Length: 38 - 52      Attendees: Patient    Visit Activities (Refresh list every visit): [facility] [employer]    Diagnostic Assessment Date: 1-23-18  Treatment Plan Review Date: 9-4-2019  See Flowsheets for today's PHQ-9 and TAMIA-7 results  Previous PHQ-9:   PHQ-9 SCORE 3/13/2018 10/26/2018 5/7/2019   PHQ-9 Total Score - - -   PHQ-9 Total Score - - -   PHQ-9 Total Score 14 20 22     Previous TAMIA-7:   TAMIA-7 SCORE 2/3/2017 3/13/2018 10/26/2018   Total Score - - -   Total Score 0 17 19   Total Score - - -       ALEXANDRA LEVEL:  ALEXANDRA Score (Last Two) 8/30/2011 6/9/2014   ALEXANDRA Raw Score 42 37   Activation Score 66 49.9   ALEXANDAR Level 3 2       DATA  Extended Session (60+ minutes): No  Interactive Complexity: No  Crisis: No  [facility] Patient: No    Treatment Objective(s) Addressed in This Session:  Target Behavior(s): disease management/lifestyle changes mental health    Depressed Mood: Decrease frequency and intensity of feeling down, depressed, hopeless  Improve quantity and quality of night time sleep / decrease daytime naps  Identify negative self-talk and behaviors: challenge core beliefs, myths, and actions  Decrease thoughts that you'd be better off dead or of suicide / self-harm  Anxiety: will experience a reduction in anxiety and will develop more effective coping skills to manage anxiety symptoms  Alcohol / Substance Use: continue to make healthy choices regarding substance use and engage in activities / supportive services that promote sobriety  Thought Disturbance: will develop skills to more effectively manage symptoms and will continue to take medications as prescribed    Current Stressors / Issues:    The patient was seen by [facility] per the request of  the PCP-(my sister is still alive but having a lot of health issues and not doing good)-she talked about having to prepare for the loss of her sister (sister is given 5 months)-she is not even thinking about the (man)-she has not been using the CPAP machine and took the machine off her face because she felt uncomfortable-she talked about fear of breathing issues while she is sleeping-there is a fear associated with breathing and the machine-we discussed use of the machine and giving her improved health and more life vs no machine and shorter life and less good health-she was encouraged to keep finding a way to use the CPAP machine as much as possible-she was recently at the hospital for medical issues (swelling) and she stated the symptoms have gotten a little better-she has been getting high blood sugar readings-she is concerned about her blood sugars and cut out the ice cream and drinks diet soda and less french fries-she has been taking her medications to control her diabetes-she agreed to go to [facility]-but her blood sugars are better than last visit-regarding sleeping she has been having hard time falling asleep and waking during the night-she is planning to visit with her sister who is dealing with some major health issues-she has not started exercising at the [facility]-she has not been using her walker-she agreed to start using he walker-regarding her mood she reported having some depression as she stated it has been up-best thing that happen in the last week was that she is still living-no suicidal thoughts-worried about how she is going to react around her sister-     Progress on Treatment Objective(s) / Homework:  Minimal progress - ACTION (Actively working towards change); Intervened by reinforcing change plan / affirming steps taken    Motivational Interviewing    MI Intervention: Expressed Empathy/Understanding, Supported Autonomy, Collaboration, Evocation, Permission to raise concern or advise,  Open-ended questions, Reflections: simple and complex, Rolled with resistance: Emphasized patient autonomy, Simple reflection and Evoked patient agenda and Change talk (evoked)     Change Talk Expressed by the Patient: Desire to change Ability to change Reasons to change Need to change Committment to change Activation Taking steps    Provider Response to Change Talk: E - Evoked more info from patient about behavior change, A - Affirmed patient's thoughts, decisions, or attempts at behavior change, R - Reflected patient's change talk and S - Summarized patient's change talk statements      Care Plan review completed: No    Medication Review:  No changes to current psychiatric medication(s)     Medication Compliance:  NA    Changes in Health Issues:   None reported     Chemical Use Review:   Substance Use: Chemical use reviewed, no active concerns identified      Tobacco Use: No current tobacco use.      Assessment: Current Emotional / Mental Status (status of significant symptoms):  Risk status (Self / Other harm or suicidal ideation)  Patient has had a history of suicidal ideation: yes  Patient denies current fears or concerns for personal safety.  Patient denies current or recent suicidal ideation or behaviors.  Patient denies current or recent homicidal ideation or behaviors.  Patient denies current or recent self injurious behavior or ideation.  Patient denies other safety concerns.  A safety and risk management plan has not been developed at this time, however patient was encouraged to call [place] [phone] should there be a change in any of these risk factors.    Appearance:   Appropriate   Eye Contact:   Good   Psychomotor Behavior: Normal   Attitude:   Cooperative   Orientation:   All  Speech   Rate / Production: Normal    Volume:  Normal   Mood:    Normal  Affect:    Appropriate  Bright   Thought Content:  Clear   Thought Form:  Coherent   Insight:    Good     Diagnoses:  1. Schizoaffective disorder,  depressive type (H)    2. Cocaine abuse, episodic (H)    3. Posttraumatic stress disorder        Collateral Reports Completed:  Not Applicable    Plan: (Homework, other):  Patient was given information about behavioral services and encouraged to schedule a follow up appointment with the clinic [facility] as needed.  She was also given information about mental health symptoms and treatment options .  CD Recommendations: Maintain Sobriety.    Richardson Lopez, [facility], [facility]                                                    Treatment Plan    Client's Name: Nena Tang  YOB: 1960    Date: 6-4-2019    DSM5 Diagnoses: (Sustained by DSM5 Criteria Listed Above)  Diagnoses:  295.70 (F25.0), Schizoaffective disorder, depressive type; 309.81 (F43.10) Posttraumatic Stress Disorder with panic specifier, history of chemical dependency issues (polysubstance dependence)  Psychosocial & Contextual Factors: Academics / Education - yes  Activities of Daily Living - yes  Financial management yes  Follow through with Medical recommendations - yes  Occupational / Vocational - yes  Social / Relational - yes, grief and loss  WHODAS Score: 30      Referral / Collaboration:  Referral to another professional/service is not indicated at this time..    Anticipated number of session or this episode of care: 20      MeasurableTreatment Goal(s) related to diagnosis / functional impairment(s)  Goal 1: Client will improve her ability to manage anxiety and mood states.     I will know I've met my goal when the man does not paralyze the me with fear.     Objective #A (Client Action)    Client will increase understanding of steps in the grief process.  Status: New - Date: 6-4-2019     Intervention(s)  Therapist will teach emotional recognition/identification. teach the navigation of grieving encouraging acceptance and adjustment.    Objective #B  Client will Decrease frequency and intensity of feeling down, depressed, hopeless  Decrease thoughts  that you'd be better off dead or of suicide / self-harm.  Status: New - Date: 6-4-2019     Intervention(s)  Therapist will teach emotional regulation skills. with the use of mindful coping strategies and open communication of feelings and thoughts (getting it out to another person).    Objective #C  Client will identify at least 3 example(s) of how drinking has resulted in an experience that interferes with person values or goals.  Status: New - Date: 6-1-2019     Intervention(s)  Therapist will teach the client how to perform a behavioral chain analysis. Process the experience of playing the tape forward to help with making the next right decision. .          Client has reviewed and agreed to the above plan.      Richardson Lopez, [facility] Center[facility]  June 4, 2019          ______________________________________________________________________

## 2019-06-04 NOTE — PROGRESS NOTES
SUBJECTIVE:                                                    Nena Tang is a 59 year old female who presents to clinic today for the following health issues:  [facility]: Richardson GORMAN  MTM: Eugenia      ED/UC Followup:    Facility:  [facility]  Date of visit: 5/29/19  Reason for visit: flank pain, back strain  Current Status: pain level 6/10    Patient was in the ED on 5/29/16 for back pain. Patient was discharged home with lidocaine patch to help with the muscle strain.     Medical Decision Making:  This patient presented with back pain.  The pain has improved with interventions in the emergency department.  The patient did not sustain any focal trauma, therefore x-rays are not necessary due to the low likelihood of fracture or subluxation. The patient has not had a fever, saddle/perineal anesthesia, bilateral foot numbness, or bowel or bladder dysfunction.  There is no clinical evidence of cauda equina syndrome, discitis, spinal/epidural space hematoma or abscess.  UA without UTI.  No concern for pyelo or ureteral stone given fully reproducible muscular tenderness on exam c/w strain.      The neurological exam is normal and the patient's symptoms are consistent with a musculoskeletal strain. There is no current evidence of radiculopathy or myelopathy. The patient will be discharged with pain medications to use as directed.       The patient was directed to avoid heavy lifting, bending or twisting. The patient was told to return for:  increasing pain, numbness, weakness, or bowel or bladder dysfunction.  The patient was advised to schedule follow-up with his/her primary doctor within 3 days to re-assess symptoms.     Diagnosis:      ICD-10-CM     1. Back strain, initial encounter S39.012A        Disposition:   The patient is discharged to home.        Patient reports that she continues to have back and shoulder pain- still waiting to get her lidocaine patches- needed a PA. Patient has been having PT. Unable to move  as much as she would like, but working on it.          Edema: Patient also reports that she has been having swollen feet and same as hands. States that the swelling is less today. Patient on a no salt diet.       Diabetes Follow-up  Patient with records of blood sugar checks from the group home. BG am: 170-340; Afternoon 286-489; Evening 234-494. Still waiting to get the new Dexcom for hopefully better diabetes management.     How often are you checking your blood sugar? Four or more times daily    What time of day are you checking your blood sugars (select all that apply)?  Before meals    Have you had any blood sugars above 200?  Yes     Have you had any blood sugars below 70?  No    What symptoms do you notice when your blood sugar is low?  None    What concerns do you have today about your diabetes? Blood sugar is often over 200     Do you have any of these symptoms? (Select all that apply)  Blurry vision, swelling     Have you had a diabetic eye exam in the last 12 months? No- has an appointment coming up      BP Readings from Last 2 Encounters:   05/29/19 111/58   05/24/19 136/78     Hemoglobin A1C (%)   Date Value   05/15/2019 7.6 (H)   12/20/2018 6.4 (H)     LDL Cholesterol Calculated (mg/dL)   Date Value   08/06/2018 84   06/27/2017 64       Diabetes Management Resources      Mental Health Follow up   Will be heading out to [place] to see her sister who is sick. Patient reports that she is struggling with this. Her son will be driving her to [place]. Patient notes that she continues to struggle wit wearing her CPAP because of the visual hallucinations she gets and the feeling of someone choking her with the CPAP machine. Patient will follow-up next week for individual therapy here in clinic.     Status since last visit: fair    See PHQ-9 for current symptoms.  Other associated symptoms: None    Complicating factors: none  Significant life event:  No   Current substance abuse:  None  Anxiety or Panic  symptoms:  No    Sleep - hard to fall asleep and stay asleep.     Appetite - good, working on increasing her water intake. Has a water bottle with her today. Patient reports that she has been going to [place] about 3 times per week. Encouraged patient to try and reduce that to only once per week.     Exercise - denies. Patient has not been using her walker as prescribed. Discussed the safety of using it and reducing the risks of falling and breaking her hip.      PHQ-9  English PHQ-9   Any Language               Problems taking medications regularly: No    Medication side effects: none    Diet: regular (no restrictions)    Social History     Tobacco Use     Smoking status: Never Smoker     Smokeless tobacco: Never Used   Substance Use Topics     Alcohol use: No     Frequency: Never     Comment: last month        Problem list and histories reviewed & adjusted, as indicated.  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 (H)     Illiterate     Irritable bowel syndrome     overweight - BMI >35     Takotsubo cardiomyopathy     CAD (coronary artery disease)     Restless legs syndrome (RLS)     CINDY (obstructive sleep apnea)- mild AHI 10.3     Verbal auditory hallucination     Chronic low back pain     Schizoaffective disorder, depressive type (H)     Migraine headache     HTN, goal below 140/90     Health Care Home     Lumbago     Cervicalgia     Cocaine abuse, episodic (H)     Suicidal ideation     Esophageal reflux     Mild nonproliferative diabetic retinopathy (H)     Tardive dyskinesia     Alcohol use     Left cataract     Falls frequently     History of uterine cancer     Psychophysiological insomnia     Dysuria     Asymptomatic postmenopausal status     Abdominal pain, right lower quadrant     Infectious  encephalopathy     Non-intractable vomiting with nausea     Thoughts of self harm     Gastroenteritis     Posttraumatic stress disorder     Cervical cancer screening     Type 2 diabetes mellitus with stage 3 chronic kidney disease, with long-term current use of insulin (H)     Positive AIDA (antinuclear antibody)     Hyperglycemia     Past Surgical History:   Procedure Laterality Date     C OOPHORECTORMY FOR RAHEL, W/BX  1983    UTERINE     CATARACT IOL, RT/LT Bilateral 2017     COLONOSCOPY N/A 3/16/2017    Procedure: COLONOSCOPY;  Surgeon: Traci Gonzalez MD;  Location:  GI     Coronary CTA  5/21/2014     HYSTERECTOMY  1983    uterine cancer yearly pap's per provider.     LAPAROSCOPIC CHOLECYSTECTOMY  2008     PHACOEMULSIFICATION CLEAR CORNEA WITH STANDARD INTRAOCULAR LENS IMPLANT Left 5/5/2017    Procedure: PHACOEMULSIFICATION CLEAR CORNEA WITH STANDARD INTRAOCULAR LENS IMPLANT;  LEFT EYE PHACOEMULSIFICATION CLEAR CORNEA WITH STANDARD INTRAOCULAR LENS IMPLANT ;  Surgeon: Tyra Diaz MD;  Location: [facility]     PHACOEMULSIFICATION CLEAR CORNEA WITH STANDARD INTRAOCULAR LENS IMPLANT Right 6/30/2017    Procedure: PHACOEMULSIFICATION CLEAR CORNEA WITH STANDARD INTRAOCULAR LENS IMPLANT;  RIGHT EYE PHACOEMULSIFICATION CLEAR CORNEA WITH STANDARD INTRAOCULAR LENS IMPLANT;  Surgeon: Tyra Diaz MD;  Location:  [facility]     RELEASE TRIGGER FINGER  10/11/2012    Left thumb. Procedure: RELEASE TRIGGER FINGER;  LEFT THUMB TRIGGER RELEASE;  Surgeon: Tay Langley MD;  Location:  [facility]     RELEASE TRIGGER FINGER Right 12/26/2016    Procedure: RELEASE TRIGGER FINGER;  Surgeon: Albino Castañeda MD;  Location: [facility] OR       Social History     Tobacco Use     Smoking status: Never Smoker     Smokeless tobacco: Never Used   Substance Use Topics     Alcohol use: No     Frequency: Never     Comment: last month     Family History   Problem Relation Age of Onset     Cancer Mother         BLADDER     Respiratory  Mother         COPD     Gastrointestinal Disease Mother         CIRRHOSIS OF LI BOLIVAR     Alcohol/Drug Mother      Diabetes Mother      Hypertension Mother      Lipids Mother      C.A.D. Mother      Glaucoma Mother      Alcohol/Drug Sister      Mental Illness Sister      Alcohol/Drug Sister      Psychotic Disorder Sister      Cancer Maternal Grandmother         UNKNOWN TYPE     Cancer Brother         COLON     Cancer - colorectal Brother         IN HIS LATE 30S     Alcohol/Drug Brother          OF HEROIN OVERDOSE AT AGE 22 YRS     Macular Degeneration No family hx of            Current Outpatient Medications   Medication Sig Dispense Refill     acetaminophen (TYLENOL) 500 MG tablet Take 2 tablets (1,000 mg) by mouth 3 times daily as needed for mild pain 100 tablet 3     albuterol (PROAIR HFA/PROVENTIL HFA/VENTOLIN HFA) 108 (90 Base) MCG/ACT Inhaler Inhale 2 puffs into the lungs every 6 hours as needed for shortness of breath / dyspnea or wheezing 1 Inhaler 0     aspirin (ASA) 81 MG EC tablet TAKE 1 TABLET (81MG) BY MOUTH DAILY 30 tablet 3     atorvastatin (LIPITOR) 80 MG tablet TAKE 1 TABLET (80MG) BY MOUTH DAILY 30 tablet 11     benztropine (COGENTIN) 0.5 MG tablet Take 2 tablets (1 mg) by mouth 2 times daily 120 tablet 2     blood glucose (NO BRAND SPECIFIED) test strip Use to test blood sugar  4 times daily or as directed. To accompany: Blood Glucose Monitor Brands: per insurance. 100 strip 11     calcium carbonate (OS-ALLEN) 1500 (600 Ca) MG tablet Take 3 tablets (1,800 mg) by mouth daily 180 tablet 3     cholecalciferol (VITAMIN D3) 05955 units (1250 mcg) capsule TAKE 1 CAPSULE (50,000 UNITS) MONTHLY 4 capsule 3     Continuous Blood Gluc  (DEXCOM G6 ) OR 1 Device 4 times daily As directed for continuous glucose monitoring 1 Device 0     Continuous Blood Gluc  (FREESTYLE LEBRON 14 DAY READER) RO 1 Device 4 times daily For continuous glucose monitoring. 1 Device 0     Continuous Blood  Gluc Sensor (DEXCOM G6 SENSOR) MISC 1 Device every 10 days For continuous glucose monitoring 3 each 11     Continuous Blood Gluc Sensor (FREESTYLE LEBRON 14 DAY SENSOR) MISC 1 Device every 14 days 6 each 3     Continuous Blood Gluc Transmit (DEXCOM G6 TRANSMITTER) MISC 1 Device every 3 months 1 each 3     diclofenac (VOLTAREN) 1 % topical gel Place 4 g onto the skin 4 times daily as needed for moderate pain 1 Tube 1     gabapentin (NEURONTIN) 300 MG capsule Take 600 mg by mouth 2 times daily morning and afternoon       gabapentin (NEURONTIN) 300 MG capsule Take 900 mg by mouth At Bedtime       glucose 4 G CHEW chewable tablet Take 2 every 15 minutes for blood sugar <70mg/dL. Recheck blood sugar every 15 minutes until above 70mg/dL, then eat a substantial meal. 20 tablet 1     guaiFENesin (ROBITUSSIN) 100 MG/5ML liquid Take 10 mLs (200 mg) by mouth every 4 hours as needed for cough 560 mL 1     insulin aspart (NOVOLOG FLEXPEN) 100 UNIT/ML pen Inject 25 Units Subcutaneous 3 times daily (with meals) Once daily, can add additional 5 units if BGs are >500mg/dL. 15 mL 11     insulin glargine (LANTUS SOLOSTAR PEN) 100 UNIT/ML pen Inject 55 Units Subcutaneous At Bedtime 3 mL 11     insulin pen needle (NOVOFINE 30) 30G X 8 MM miscellaneous USE 4 DAILY OR AS DIRECTED 400 each 0     ipratropium - albuterol 0.5 mg/2.5 mg/3 mL (DUONEB) 0.5-2.5 (3) MG/3ML neb solution Take 1 vial (3 mLs) by nebulization every 6 hours as needed for shortness of breath / dyspnea or wheezing 30 vial 0     leflunomide (ARAVA) 20 MG tablet Take 1 tablet (20 mg) by mouth daily 60 tablet 0     lidocaine (LIDODERM) 5 % patch Place 1 patch onto the skin every 24 hours for 10 days 10 patch 0     losartan (COZAAR) 50 MG tablet Take 1 tablet (50 mg) by mouth daily 90 tablet 3     melatonin 5 MG CAPS Take 2 capsules by mouth At Bedtime 180 capsule 3     metoprolol succinate ER (TOPROL-XL) 25 MG 24 hr tablet Take 1 tablet (25 mg) by mouth daily 90 tablet 1      order for DME Equipment being ordered: Depends. 30 each 1     order for DME Equipment being ordered: Freestyle Gabby Clarks 1 Device 0     paliperidone ER (INVEGA) 9 MG 24 hr tablet Take 1 tablet (9 mg) by mouth every morning 30 tablet 2     polyethylene glycol (MIRALAX/GLYCOLAX) powder TAKE 8.5 GRAMS (1/2 CAPFUL) BY MOUTH EVERY OTHER DAY. 527 g 1     ranitidine (ZANTAC) 300 MG tablet TAKE 1 TABLET (300MG) BY MOUTH AT BEDTIME 90 tablet 1     Semaglutide (OZEMPIC) 0.25 or 0.5 MG/DOSE SOPN Inject 0.25 mg Subcutaneous once a week for 28 days, THEN 0.5 mg once a week for 28 days. 6 mL 1     senna-docusate (SENOKOT-S;PERICOLACE) 8.6-50 MG per tablet Take 1 tablet by mouth 2 times daily as needed for constipation 100 tablet 1     sertraline (ZOLOFT) 100 MG tablet Take 2 tablets (200 mg) by mouth daily 60 tablet 2     spacer (OPTICHAMBER PATRICIA) holding chamber Holding/spacer device to use with inhaler. 1 each 0     SUMAtriptan (IMITREX) 25 MG tablet Take 1-2 tablets (25-50 mg) by mouth at onset of headache for migraine May repeat in 2 hours. Max 8 tablets/24 hours. 12 tablet 1     topiramate (TOPAMAX) 50 MG tablet Take 1.5 tablets (75 mg) by mouth 2 times daily 90 tablet 3     Allergies   Allergen Reactions     Imidazole Antifungals Hives     Tolerates diflucan     Ketoprofen Itching     Pruritis to topical     Lisinopril Hives     Metformin Other (See Comments)     Patient hospitalized for lactic acidosis - admitting provider suspectd caused by metformin     Metronidazole Hives     Posaconazole Hives     Tolerates diflucan     Recent Labs   Lab Test 05/29/19  2226 05/24/19  0916  05/15/19  1822  12/20/18  1532  08/06/18  1038  06/27/17  1358  07/13/16  1350  07/14/15  1215   A1C  --   --   --  7.6*  --  6.4*  --  6.4*   < > 7.0*   < >  --    < > 9.1*   LDL  --   --   --   --   --   --   --  84  --  64  --  137*  --  78   HDL  --   --   --   --   --   --   --  46*  --  37*  --   --   --  39*   TRIG  --   --   --   --    --   --   --  215*  --  267*  --   --   --  151*   ALT 32 30  --  33   < >  --    < >  --    < > 32   < >  --    < >  --    CR 0.89 0.87   < > 0.93   < > 0.99   < >  --    < > 0.78   < >  --    < > 0.67   GFRESTIMATED 71 73   < > 67   < > 63   < >  --    < > 76   < >  --    < > >90  Non  GFR Calc     GFRESTBLACK 82 85   < > 78   < > 73   < >  --    < > >90   GFR Calc     < >  --    < > >90   GFR Calc     POTASSIUM 4.0 4.0   < > 4.3   < > 4.2   < >  --    < > 4.3   < >  --    < > 4.3   TSH  --  1.62  --   --   --  1.98  --   --    < >  --    < >  --    < >  --     < > = values in this interval not displayed.      BP Readings from Last 3 Encounters:   05/29/19 111/58   05/24/19 136/78   05/21/19 136/76    Wt Readings from Last 3 Encounters:   05/24/19 109.3 kg (241 lb)   05/21/19 109.3 kg (241 lb)   05/15/19 110 kg (242 lb 6.4 oz)        Labs reviewed in EPIC  Problem list, Medication list, Allergies, and Medical/Social/Surgical histories reviewed in [facility] and updated as appropriate.     ROS: Constitutional, neuro, ENT, endocrine, pulmonary, cardiac, gastrointestinal, genitourinary, musculoskeletal, integument and psychiatric systems are negative, except as otherwise noted above in the HPI.   OBJECTIVE:                                                    /72   Pulse 94   Temp 97.2  F (36.2  C)   Resp 18   Wt 110.7 kg (244 lb)   LMP 01/06/2015   SpO2 94%   BMI 47.65 kg/m    Body mass index is 47.65 kg/m .    GENERAL: healthy, alert and no distress  RESP: lungs clear to auscultation - no rales, rhonchi or wheezes  CV: regular rates and rhythm, normal S1 S2, no S3 or S4 + bilateral lower extremity pitting edema  ABDOMEN: bowel sounds normal and unable to palpate for organomegaly due to body habitus.   MS: decreased range of motion on bilateral shoulders.   SKIN: no suspicious lesions or rashes and acne; some open areas under abdominal skin fold. No signs of  infection.   NEURO: Normal strength and tone, mentation intact and speech normal    Mental Status Assessment:  Appearance:   Appropriate   Eye Contact:   Good   Psychomotor Behavior: Normal   Attitude:   Cooperative   Orientation:   All  Speech   Rate / Production: Normal    Volume:  Normal   Mood:    Normal  Affect:    Appropriate   Thought Content:  Clear   Thought Form:  Coherent  Logical   Insight:    Fair   Attention Span and Concentration: appropriate.   Recent and Remote Memory:  intact  Fund of Knowledge: appropriate  Diagnostic Test Results:  Results for orders placed or performed during the hospital encounter of 05/29/19   UA with Microscopic   Result Value Ref Range    Color Urine Light Yellow     Appearance Urine Clear     Glucose Urine 100 (A) NEG^Negative mg/dL    Bilirubin Urine Negative NEG^Negative    Ketones Urine Negative NEG^Negative mg/dL    Specific Gravity Urine 1.024 1.003 - 1.035    Blood Urine Negative NEG^Negative    pH Urine 6.0 5.0 - 7.0 pH    Protein Albumin Urine 10 (A) NEG^Negative mg/dL    Urobilinogen mg/dL Normal 0.0 - 2.0 mg/dL    Nitrite Urine Negative NEG^Negative    Leukocyte Esterase Urine Moderate (A) NEG^Negative    Source Midstream Urine     WBC Urine 3 0 - 5 /HPF    RBC Urine 1 0 - 2 /HPF    Bacteria Urine Few (A) NEG^Negative /HPF    Squamous Epithelial /HPF Urine 8 (H) 0 - 1 /HPF    Mucous Urine Present (A) NEG^Negative /LPF   Comprehensive metabolic panel   Result Value Ref Range    Sodium 138 133 - 144 mmol/L    Potassium 4.0 3.4 - 5.3 mmol/L    Chloride 107 94 - 109 mmol/L    Carbon Dioxide 23 20 - 32 mmol/L    Anion Gap 8 3 - 14 mmol/L    Glucose 172 (H) 70 - 99 mg/dL    Urea Nitrogen 15 7 - 30 mg/dL    Creatinine 0.89 0.52 - 1.04 mg/dL    GFR Estimate 71 >60 mL/min/[1.73_m2]    GFR Estimate If Black 82 >60 mL/min/[1.73_m2]    Calcium 8.8 8.5 - 10.1 mg/dL    Bilirubin Total 0.1 (L) 0.2 - 1.3 mg/dL    Albumin 3.4 3.4 - 5.0 g/dL    Protein Total 7.4 6.8 - 8.8 g/dL     Alkaline Phosphatase 97 40 - 150 U/L    ALT 32 0 - 50 U/L    AST 24 0 - 45 U/L   Nt probnp inpatient   Result Value Ref Range    N-Terminal Pro BNP Inpatient 67 0 - 900 pg/mL   Urine Culture Aerobic Bacterial   Result Value Ref Range    Specimen Description Midstream Urine     Special Requests Specimen received in preservative     Culture Micro       10,000 to 50,000 colonies/mL  mixed urogenital timoteo  Susceptibility testing not routinely done       *Note: Due to a large number of results and/or encounters for the requested time period, some results have not been displayed. A complete set of results can be found in Results Review.        ASSESSMENT/PLAN:                                                    (E11.3299,  Z79.4) Type 2 diabetes mellitus with mild nonproliferative retinopathy without macular edema, with long-term current use of insulin, unspecified laterality (H)  (primary encounter diagnosis)  Comment: diabetes still not well managed. Working with MTM and endocrine for better management.   Lab Results   Component Value Date    A1C 7.6 05/15/2019    A1C 6.4 12/20/2018    A1C 6.4 08/06/2018    A1C 6.2 05/22/2018    A1C 6.8 02/12/2018     Plan: insulin glargine (LANTUS SOLOSTAR PEN) 100         UNIT/ML pen        -Increase Lantus to 60 units at bedtime.   -Increase the Ozempic to 0.50 mg weekly.       (E66.01) overweight - BMI >35  (L90.9) Skin breakdown  Comment: Body mass index is 47.65 kg/m .  Wt Readings from Last 4 Encounters:   06/04/19 110.7 kg (244 lb)   05/24/19 109.3 kg (241 lb)   05/21/19 109.3 kg (241 lb)   05/15/19 110 kg (242 lb 6.4 oz)   Patient is having some issues with skin folds due to her increased weight. Encouraged patient to continue using a soft cloth under the folds to keep the area dry.   Plan: nystatin (MYCOSTATIN) 178329 UNIT/GM external         powder        -Start using the powder under the folds.       (Z13.820) Screening for osteoporosis  (Z78.0) Asymptomatic menopausal state    Comment: Routine screening.   Plan: DEXA HIP/PELVIS/SPINE - Future        Patient navigator will call to schedule appointment.       Patient Instructions   -Increase Ozempic to 0.50 mg weekly.   -Checking complete blood count, liver funtion tests and sedimentation rate- next due in July/August  -Increase Lantus to 60 units at bedtime.   -Continue to hold Ibuprofen for now and we can re-evaluate after the next lab check.     I spent Greater than 40 minutes was spent face to face with Nena Tang, with greater than 50 % in counselling and consultation about as noted above.       ART Fay CNP  [facility]

## 2019-06-12 DIAGNOSIS — I25.119 CORONARY ARTERY DISEASE INVOLVING NATIVE HEART WITH ANGINA PECTORIS, UNSPECIFIED VESSEL OR LESION TYPE (H): ICD-10-CM

## 2019-06-12 DIAGNOSIS — M54.2 CERVICALGIA: Primary | ICD-10-CM

## 2019-06-12 NOTE — TELEPHONE ENCOUNTER
"Requested Prescriptions   Pending Prescriptions Disp Refills     aspirin (ASA) 81 MG EC tablet  Last Written Prescription Date:  2/13/19  Last Fill Quantity: 30,  # refills: 3   Last office visit: 6/4/2019 with prescribing provider:     Future Office Visit:   Next 5 appointments (look out 90 days)    Jun 14, 2019 10:15 AM CDT  Return Visit with Carmen Chamorro PT  [facility] ([facility]) [address]  [address]  [facility] [address]  [phone]   Jul 02, 2019  3:30 PM CDT  Return Visit with ART Arias CNP  [facility] ([facility]) [address]  [address] [place] [id number] [phone]   Jul 02, 2019  3:30 PM CDT  Return Visit with Richardson Lopez [facility] ([facility]) [address]  [address] [place] [id number] [phone]   Jul 02, 2019  3:30 PM CDT  SHORT with Eugenia De Jesus [facility] MTM ([facility]) [address] [place] [address]  [address] [place] [id number] [phone]          30 tablet 3     Sig: TAKE 1 TABLET (81MG) BY MOUTH DAILY       Analgesics (Non-Narcotic Tylenol and ASA Only) Passed - 6/12/2019 12:46 PM        Passed - Recent (12 mo) or future (30 days) visit within the authorizing provider's specialty     Patient had office visit in the last 12 months or has a visit in the next 30 days with authorizing provider or within the authorizing provider's specialty.  See \"Patient Info\" tab in inbasket, or \"Choose Columns\" in Meds & Orders section of the refill encounter.              Passed - Patient is age 20 years or older     If ASA is flagged for ages under 20 years old. Forward to provider for confirmation Ryes Syndrome is not a concern.              Passed - Medication is active on med list        gabapentin " (NEURONTIN) 300 MG capsule  Last Written Prescription Date:  Historical  Last Fill Quantity: Historical,  # refills: Historical   Last office visit: 6/4/2019 with prescribing provider:     Future Office Visit:   Next 5 appointments (look out 90 days)    Jun 14, 2019 10:15 AM CDT  Return Visit with Carmen Chamorro PT  [facility] ([facility]) [address]  [address]  [facility] [address]  [phone]   Jul 02, 2019  3:30 PM CDT  Return Visit with ART Arias CNP  [facility] ([facility]) [address] [place], [place] [address]  [address]  [facility] [address]  [phone]   Jul 02, 2019  3:30 PM CDT  Return Visit with Richardson Lopez [facility] ([facility]) [address]  [address] [place] [id number] [phone]   Jul 02, 2019  3:30 PM CDT  SHORT with Euegnia De Jesus [facility] [place] ([facility]) [address] [place] [address]  [address]  [facility] [address]  [phone]                Sig: Take 2 capsules (600 mg) by mouth 2 times daily morning and afternoon       There is no refill protocol information for this order

## 2019-06-13 RX ORDER — GABAPENTIN 300 MG/1
600 CAPSULE ORAL 2 TIMES DAILY
Qty: 120 CAPSULE | Refills: 1 | Status: SHIPPED | OUTPATIENT
Start: 2019-06-13 | End: 2019-07-02

## 2019-06-13 NOTE — TELEPHONE ENCOUNTER
Medication Refill Request    Medication(s) requested:  gabapentin (NEURONTIN) 300 MG capsule  aspirin (ASA) 81 MG EC tablet    Last Office Visit: 6/4/19  Next Office Visit: 7/2/19  Labs: up to date  Recent BP checks WNL and stable.     Aspirin filled per [facility] protocol.      Gabapentin last filled:  5/15/19-- #196 for 28 day supply  Documentation in problem list reviewed:  Yes  Processing:  electronically send to patient's pharmacy  RX [place] ([place] reviewed:  reviewed- no concerns      Corine Cunha RN  06/13/19  8:24 AM

## 2019-06-14 ENCOUNTER — OFFICE VISIT (OUTPATIENT)
Dept: PALLIATIVE MEDICINE | Facility: CLINIC | Age: 59
End: 2019-06-14
Payer: MEDICARE

## 2019-06-14 DIAGNOSIS — G89.4 CHRONIC PAIN SYNDROME: Primary | ICD-10-CM

## 2019-06-14 PROCEDURE — 97110 THERAPEUTIC EXERCISES: CPT | Mod: GP | Performed by: PHYSICAL THERAPIST

## 2019-06-14 NOTE — PROGRESS NOTES
PAIN PHYSICAL THERAPY PROGRESS NOTE  Patient Name: Nena Tang      YOB: 1960     Medical Record Number: [id number]  Diagnosis: Chronic pain syndrome    Visit:   Chief Complaint: Bilateral shoulder pain, L > R   Per Dr. Beltran 2019: Presents with acute bilateral shoulder pain with significant myofascial component  Exam is non-focal. Low suspicion for rotator cuff tearing  Recommend pain physical therapy to address myofascial pain which is most causative  Early frozen shoulder could also be causing some decreased range of motion  Need to be sure she can manage blood sugars before doing a cortisone injection.   Would start with therapy first and if not improving can proceed with injection and would recommend one shoulder at a time.  Follow-up if pain is not improved after 4 therapy visits for cortisone injection.     Patient's goals for physical therapy: being able to use arms for reaching to get into van, dressing, bathing/grooming and sleep     Subjective:   Last: Reports increased shoulder pain with pushing herself out of a chair or the van; does better with pulling herself to change positions.  Unable to lie on treatment table as she is afraid she won't be able to get back up; requires grab bar for pulling herself up from lying down  Today:  Pt reports feeling increased tension in neck, upper back and shoulders today. Increased pain and tension related to grief/loss due to her sister's passing yesterday.  She will be driving to [place] with her son next week to attend .    Objective Findings:  OBSERVATION: pt appears more fatigued yet motivated to learn HEP  Instructed in HEP:  --table top reaching  --cane AROM in all directions  --butterfly stretch  --towel stretch  --chair squats x 5  --rowing YTB x 5  --shoulder rolls, bow/arrow, PNF 2 pattern    Instructed in upright posture in sitting to engage core musculature and reduce forward shoulder posture; significant postural  fatigue  Instructed in anatomy and understanding of scapular mobility, shoulder alignment and lower trap muscles needed for scapular stability; pt states she is a visual and hands-on learner  Instructed in seated shoulder rolls,  bow/arrow active stretching and UE PNF 2 pattern for shoulder mobility.  Issued rice sock for UB/Neck.    Treatment Interventions:  Therapeutic Procedures/Exercises: for 45 minutes as above.  __________________________________________________________________    Assessment:  Ongoing Functional Limitations Include:  Patient tolerated/responded well to treatment    Intensity Level: 3 (1=low intensity; 5=high intensity)  Demonstrates/Verbalizes Technique: 5 (1= poor technique-difficulty performing exercises,significant cues required; 5= good technique-performs exercises without cues)  Body Awareness: 3 (1=low awareness; 5=high awareness)  Posture/Stability: 3 (1= poor posture, stability; 5= good posture, stability)  Motivational Level: Cooperative  Response to Teaching: cooperative  Factors that affect learning: None    _______________________________________________________________________  Plan of Care  Continue PT to support reactivation and integration of self regulation pain management skills;  Continue with prescribed plan of care - progress as tolerated.  Focus next session will be on: Instructed patient to schedule recheck with Dr. Beltran per treatment plan. Consider to add wall climbs, nustep, pulleys, shoulder extension YTB;  monitor posture and scapular retraction     Present:  KATHY     Total Visit Time:  45 minutes    Therapist: Carmen Chamorro PT             Date: 6/14/2019

## 2019-06-19 ENCOUNTER — DOCUMENTATION ONLY (OUTPATIENT)
Dept: SLEEP MEDICINE | Facility: CLINIC | Age: 59
End: 2019-06-19

## 2019-06-19 NOTE — PROGRESS NOTES
Patient contacted regarding PAP usage that has either dropped off below an average of 20 minutes per night or device has stopped reporting into Epic or vendor site.    Diagnostic AHI: 62.5   PSG    Patient still has device : Yes    Last date device called in 6/19/2019    Last usage date 5/18/2018     Message left for patient to return call       Current reporting of device:    Objective measures: 14 day rolling measures         Compliance  0 %          Average number of minutes 0      Action plan:  voicemail left; waiting for return call from patient

## 2019-06-20 ENCOUNTER — TELEPHONE (OUTPATIENT)
Dept: PHARMACY | Facility: CLINIC | Age: 59
End: 2019-06-20

## 2019-06-20 NOTE — TELEPHONE ENCOUNTER
"Called to follow-up on diabetes and medication changes.     Pt states her sister passed away and has made plans to travel to [place]. Leaves on Sunday and plans to be back on the following Wednesday. Pt states she is doing OK \"hanging in there\".  Declined any assistance with medical or medications today but interested in [facility] outreach.     Will forward to Richardson Lopez to outreach tomorrow afternoon by phone and check in.     Eugenia De Jesus, KiD, BCACP   "

## 2019-06-25 ENCOUNTER — TELEPHONE (OUTPATIENT)
Dept: BEHAVIORAL HEALTH | Facility: CLINIC | Age: 59
End: 2019-06-25

## 2019-06-25 NOTE — TELEPHONE ENCOUNTER
Left a message stating it being a call to reach out to her to support her-asking that she call back and that this writer will call her back

## 2019-06-28 ENCOUNTER — TELEPHONE (OUTPATIENT)
Dept: FAMILY MEDICINE | Facility: CLINIC | Age: 59
End: 2019-06-28

## 2019-07-01 ENCOUNTER — TRANSFERRED RECORDS (OUTPATIENT)
Dept: HEALTH INFORMATION MANAGEMENT | Facility: CLINIC | Age: 59
End: 2019-07-01

## 2019-07-02 ENCOUNTER — OFFICE VISIT (OUTPATIENT)
Dept: BEHAVIORAL HEALTH | Facility: CLINIC | Age: 59
End: 2019-07-02
Payer: MEDICARE

## 2019-07-02 ENCOUNTER — OFFICE VISIT (OUTPATIENT)
Dept: PHARMACY | Facility: CLINIC | Age: 59
End: 2019-07-02
Payer: COMMERCIAL

## 2019-07-02 ENCOUNTER — OFFICE VISIT (OUTPATIENT)
Dept: FAMILY MEDICINE | Facility: CLINIC | Age: 59
End: 2019-07-02
Payer: MEDICARE

## 2019-07-02 ENCOUNTER — MEDICAL CORRESPONDENCE (OUTPATIENT)
Dept: HEALTH INFORMATION MANAGEMENT | Facility: CLINIC | Age: 59
End: 2019-07-02

## 2019-07-02 VITALS
HEART RATE: 95 BPM | RESPIRATION RATE: 18 BRPM | WEIGHT: 235.5 LBS | DIASTOLIC BLOOD PRESSURE: 70 MMHG | SYSTOLIC BLOOD PRESSURE: 132 MMHG | BODY MASS INDEX: 45.99 KG/M2 | TEMPERATURE: 97.1 F | OXYGEN SATURATION: 94 %

## 2019-07-02 DIAGNOSIS — G89.29 CHRONIC PAIN OF BOTH SHOULDERS: ICD-10-CM

## 2019-07-02 DIAGNOSIS — E66.9 OBESITY, UNSPECIFIED OBESITY SEVERITY, UNSPECIFIED OBESITY TYPE: Primary | ICD-10-CM

## 2019-07-02 DIAGNOSIS — G47.33 OSA (OBSTRUCTIVE SLEEP APNEA): ICD-10-CM

## 2019-07-02 DIAGNOSIS — E66.01 MORBID OBESITY (H): ICD-10-CM

## 2019-07-02 DIAGNOSIS — F25.1 SCHIZOAFFECTIVE DISORDER, DEPRESSIVE TYPE (H): ICD-10-CM

## 2019-07-02 DIAGNOSIS — Z79.4 TYPE 2 DIABETES MELLITUS WITH MILD NONPROLIFERATIVE RETINOPATHY WITHOUT MACULAR EDEMA, WITH LONG-TERM CURRENT USE OF INSULIN, UNSPECIFIED LATERALITY (H): ICD-10-CM

## 2019-07-02 DIAGNOSIS — M54.2 CERVICALGIA: ICD-10-CM

## 2019-07-02 DIAGNOSIS — Z79.4 TYPE 2 DIABETES MELLITUS WITH STAGE 3 CHRONIC KIDNEY DISEASE, WITH LONG-TERM CURRENT USE OF INSULIN (H): Primary | ICD-10-CM

## 2019-07-02 DIAGNOSIS — E11.65 TYPE 2 DIABETES MELLITUS WITH HYPERGLYCEMIA, WITH LONG-TERM CURRENT USE OF INSULIN (H): ICD-10-CM

## 2019-07-02 DIAGNOSIS — E78.5 HYPERLIPIDEMIA LDL GOAL <100: ICD-10-CM

## 2019-07-02 DIAGNOSIS — I10 HTN, GOAL BELOW 140/90: ICD-10-CM

## 2019-07-02 DIAGNOSIS — M25.512 CHRONIC PAIN OF BOTH SHOULDERS: ICD-10-CM

## 2019-07-02 DIAGNOSIS — K59.00 CONSTIPATION, UNSPECIFIED CONSTIPATION TYPE: ICD-10-CM

## 2019-07-02 DIAGNOSIS — R76.8 POSITIVE ANA (ANTINUCLEAR ANTIBODY): ICD-10-CM

## 2019-07-02 DIAGNOSIS — F25.1 SCHIZOAFFECTIVE DISORDER, DEPRESSIVE TYPE (H): Primary | ICD-10-CM

## 2019-07-02 DIAGNOSIS — E11.3299 TYPE 2 DIABETES MELLITUS WITH MILD NONPROLIFERATIVE RETINOPATHY WITHOUT MACULAR EDEMA, WITH LONG-TERM CURRENT USE OF INSULIN, UNSPECIFIED LATERALITY (H): ICD-10-CM

## 2019-07-02 DIAGNOSIS — F43.10 POSTTRAUMATIC STRESS DISORDER: ICD-10-CM

## 2019-07-02 DIAGNOSIS — N18.30 TYPE 2 DIABETES MELLITUS WITH STAGE 3 CHRONIC KIDNEY DISEASE, WITH LONG-TERM CURRENT USE OF INSULIN (H): Primary | ICD-10-CM

## 2019-07-02 DIAGNOSIS — F25.0 SCHIZOAFFECTIVE DISORDER, BIPOLAR TYPE (H): ICD-10-CM

## 2019-07-02 DIAGNOSIS — E11.22 TYPE 2 DIABETES MELLITUS WITH STAGE 3 CHRONIC KIDNEY DISEASE, WITH LONG-TERM CURRENT USE OF INSULIN (H): Primary | ICD-10-CM

## 2019-07-02 DIAGNOSIS — G25.9 EXTRAPYRAMIDAL AND MOVEMENT DISORDER: ICD-10-CM

## 2019-07-02 DIAGNOSIS — M25.511 CHRONIC PAIN OF BOTH SHOULDERS: ICD-10-CM

## 2019-07-02 DIAGNOSIS — Z79.4 TYPE 2 DIABETES MELLITUS WITH HYPERGLYCEMIA, WITH LONG-TERM CURRENT USE OF INSULIN (H): ICD-10-CM

## 2019-07-02 PROCEDURE — 99607 MTMS BY PHARM ADDL 15 MIN: CPT | Performed by: PHARMACIST

## 2019-07-02 PROCEDURE — 90832 PSYTX W PT 30 MINUTES: CPT | Performed by: SOCIAL WORKER

## 2019-07-02 PROCEDURE — 99215 OFFICE O/P EST HI 40 MIN: CPT | Performed by: NURSE PRACTITIONER

## 2019-07-02 PROCEDURE — 99606 MTMS BY PHARM EST 15 MIN: CPT | Performed by: PHARMACIST

## 2019-07-02 RX ORDER — GABAPENTIN 300 MG/1
900 CAPSULE ORAL 3 TIMES DAILY
Qty: 270 CAPSULE | Refills: 1 | Status: SHIPPED | OUTPATIENT
Start: 2019-07-02 | End: 2019-08-30

## 2019-07-02 RX ORDER — BENZTROPINE MESYLATE 0.5 MG/1
1 TABLET ORAL 2 TIMES DAILY
Qty: 120 TABLET | Refills: 3 | Status: SHIPPED | OUTPATIENT
Start: 2019-07-02 | End: 2019-10-21

## 2019-07-02 RX ORDER — ATORVASTATIN CALCIUM 80 MG/1
TABLET, FILM COATED ORAL
Qty: 30 TABLET | Refills: 11 | Status: SHIPPED | OUTPATIENT
Start: 2019-07-02 | End: 2020-05-18

## 2019-07-02 RX ORDER — SERTRALINE HYDROCHLORIDE 100 MG/1
200 TABLET, FILM COATED ORAL DAILY
Qty: 60 TABLET | Refills: 3 | Status: SHIPPED | OUTPATIENT
Start: 2019-07-02 | End: 2019-10-21

## 2019-07-02 NOTE — PATIENT INSTRUCTIONS
-Increased Gabapentin to 900 mg three times per day. Prescription sent to [place] pharmacy.   -Increase Ozempic to 1 mg every 7 days.

## 2019-07-02 NOTE — PROGRESS NOTES
[facility]   July 2, 2019    Behavioral Health Clinician Progress Note    Patient Name: Nena Tang           Service Type:  Individual      Service Location:   Face to Face in Clinic     Session Start Time: 3:30pm  Session End Time:  4pm      Session Length: 16 - 37      Attendees: Patient    Visit Activities (Refresh list every visit): [facility] [employer]    Diagnostic Assessment Date: 1-23-18  Treatment Plan Review Date: 9-4-2019  See Flowsheets for today's PHQ-9 and TAMIA-7 results  Previous PHQ-9:   PHQ-9 SCORE 3/13/2018 10/26/2018 5/7/2019   PHQ-9 Total Score - - -   PHQ-9 Total Score - - -   PHQ-9 Total Score 14 20 22     Previous TAMIA-7:   TAMIA-7 SCORE 2/3/2017 3/13/2018 10/26/2018   Total Score - - -   Total Score 0 17 19   Total Score - - -       ALEXANDRA LEVEL:  ALEXANDRA Score (Last Two) 8/30/2011 6/9/2014   ALEXANDRA Raw Score 42 37   Activation Score 66 49.9   ALEXANDRA Level 3 2       DATA  Extended Session (60+ minutes): No  Interactive Complexity: No  Crisis: No  [facility] Patient: No    Treatment Objective(s) Addressed in This Session:  Target Behavior(s): disease management/lifestyle changes mental health    Depressed Mood: Decrease frequency and intensity of feeling down, depressed, hopeless  Improve quantity and quality of night time sleep / decrease daytime naps  Identify negative self-talk and behaviors: challenge core beliefs, myths, and actions  Decrease thoughts that you'd be better off dead or of suicide / self-harm  Anxiety: will experience a reduction in anxiety and will develop more effective coping skills to manage anxiety symptoms  Alcohol / Substance Use: continue to make healthy choices regarding substance use and engage in activities / supportive services that promote sobriety  Thought Disturbance: will develop skills to more effectively manage symptoms and will continue to take medications as prescribed    Current Stressors / Issues:    The patient was seen by [facility] per the request of  the PCP-the patient talked about the  and the events and how it was both good and challenging-she stated that she found meaning in the death that her sister is no longer in suffering-it was sad but she is feeling better-still working through grief-planing to go back to [place] for her sister's [employer] party but she stated she is not going to go-she recently had MRI on her shoulder and she is anxious to hear/be informed of the results-she liked being seen by this specialist that completed the MRI-regarding sleeping the patient reported that she has not been using her CPAP machine in the box (I should not have done that)-she continues to have apprehension about using her CPAP machine the result is she is not getting good sleep and feeling more irritable-she plans to start over again with unboxing the machine and use it to help sleep-this is the first time when someone  close to me and I did not go into the hospital as a result and no suicidal thoughts-she reached out to others to talk about her feelings-she has outpatient therapist and she reached out to this person-      Progress on Treatment Objective(s) / Homework:  Minimal progress - ACTION (Actively working towards change); Intervened by reinforcing change plan / affirming steps taken    Motivational Interviewing    MI Intervention: Expressed Empathy/Understanding, Supported Autonomy, Collaboration, Evocation, Permission to raise concern or advise, Open-ended questions, Reflections: simple and complex, Rolled with resistance: Emphasized patient autonomy, Simple reflection and Evoked patient agenda and Change talk (evoked)     Change Talk Expressed by the Patient: Desire to change Ability to change Reasons to change Need to change Committment to change Activation Taking steps    Provider Response to Change Talk: E - Evoked more info from patient about behavior change, A - Affirmed patient's thoughts, decisions, or attempts at behavior change, R -  Reflected patient's change talk and S - Summarized patient's change talk statements      Care Plan review completed: No    Medication Review:  No changes to current psychiatric medication(s)     Medication Compliance:  NA    Changes in Health Issues:   None reported     Chemical Use Review:   Substance Use: Chemical use reviewed, no active concerns identified      Tobacco Use: No current tobacco use.      Assessment: Current Emotional / Mental Status (status of significant symptoms):  Risk status (Self / Other harm or suicidal ideation)  Patient has had a history of suicidal ideation: yes  Patient denies current fears or concerns for personal safety.  Patient denies current or recent suicidal ideation or behaviors.  Patient denies current or recent homicidal ideation or behaviors.  Patient denies current or recent self injurious behavior or ideation.  Patient denies other safety concerns.  A safety and risk management plan has not been developed at this time, however patient was encouraged to call [place] [phone] should there be a change in any of these risk factors.    Appearance:   Appropriate   Eye Contact:   Good   Psychomotor Behavior: Normal   Attitude:   Cooperative   Orientation:   All  Speech   Rate / Production: Normal    Volume:  Normal   Mood:    Normal  Affect:    Appropriate  Bright   Thought Content:  Clear   Thought Form:  Coherent   Insight:    Good     Diagnoses:  No diagnosis found.    Collateral Reports Completed:  Not Applicable    Plan: (Homework, other):  Patient was given information about behavioral services and encouraged to schedule a follow up appointment with the clinic [facility] as needed.  She was also given information about mental health symptoms and treatment options .  CD Recommendations: Maintain Sobriety.    BIN George, [facility]                                                    Treatment Plan    Client's Name: Nena Tang  YOB: 1960    Date: 6-4-2019    DSM5  Diagnoses: (Sustained by DSM5 Criteria Listed Above)  Diagnoses:  295.70 (F25.0), Schizoaffective disorder, depressive type; 309.81 (F43.10) Posttraumatic Stress Disorder with panic specifier, history of chemical dependency issues (polysubstance dependence)  Psychosocial & Contextual Factors: Academics / Education - yes  Activities of Daily Living - yes  Financial management yes  Follow through with Medical recommendations - yes  Occupational / Vocational - yes  Social / Relational - yes, grief and loss  WHODAS Score: 30      Referral / Collaboration:  Referral to another professional/service is not indicated at this time..    Anticipated number of session or this episode of care: 20      MeasurableTreatment Goal(s) related to diagnosis / functional impairment(s)  Goal 1: Client will improve her ability to manage anxiety and mood states.     I will know I've met my goal when the man does not paralyze the me with fear.     Objective #A (Client Action)    Client will increase understanding of steps in the grief process.  Status: New - Date: 6-4-2019     Intervention(s)  Therapist will teach emotional recognition/identification. teach the navigation of grieving encouraging acceptance and adjustment.    Objective #B  Client will Decrease frequency and intensity of feeling down, depressed, hopeless  Decrease thoughts that you'd be better off dead or of suicide / self-harm.  Status: New - Date: 6-4-2019     Intervention(s)  Therapist will teach emotional regulation skills. with the use of mindful coping strategies and open communication of feelings and thoughts (getting it out to another person).    Objective #C  Client will identify at least 3 example(s) of how drinking has resulted in an experience that interferes with person values or goals.  Status: New - Date: 6-4-2019    Intervention(s)  Therapist will teach the client how to perform a behavioral chain analysis. Process the experience of playing the tape forward to  help with making the next right decision. .          Client has reviewed and agreed to the above plan.      Richardson Lopez, LICSW  June 4, 2019          ______________________________________________________________________

## 2019-07-02 NOTE — PROGRESS NOTES
"SUBJECTIVE/OBJECTIVE:                Nena Tang is a 59 year old female coming in for a follow-up visit for Medication Therapy Management.  She was referred to me from Rowena Haas. Seen as a covisit with PCP and Richardson Lopez [facility].    Chief Complaint: Follow up from our visit on 19.  Sister recently passed away, just back from the .     Tobacco: No tobacco use  Alcohol: not currently using    Medication Adherence/Access: no issues reported  Adult foster home staff sets up and administers medications, except when pt leaves home     Shoulder pain: Patient is currently taking gabapentin 600mg AM, 600mg, 900mg HS. Tylenol doesn't work very well. Not taking any ibuprofen, though looks like it was ordered today by rheumatology. Has taken in the past and worried about her kidneys so she doesn't want to take it. Saw [facility] rheum, unclear how she ended up there for a second opinion.   Leflunomide was discontinued yesterday per [facility] (Dr. Maximino Singleton); following is reasoning and excerpt from that visit's Progress Note:      \"I do not believe your pain is autoimmune. Leflunomide is a medication for autoimmune issues; I believe it can be stopped. Your shoulder pain is likely either: Some issues with rotator cuff tendonitis or it could be bilateral frozen shoulder which is much more common in diabetics. If we really wanted to know whats going on in the shoulders, we'd do MRI of the shoulders. The other thing I notice about you is that you have multiple tender muscle areas. This suggests to me there is a component of fibromyalgia. Gabapentin can help that. The other medication that can help is duloxetine. This would need to replace sertraline and that could be discussed with psychiatry, but I don't think its urgent. Sertraline raises serotonin and duloxetine raises serotonin and norepinephrine and the \"dual inhibitor\" effect of duloxetine has been shown to lessen pain\"   MRI " findings:      IMPRESSION:    1.Low-grade partial thickness linear intrasubstance tear of the supraspinatus tendon. Moderate underlying supraspinatus tendinopathy.   2.Low-grade partial-thickness articular sided tear of the distal attachment and infraspinatus tendons. Moderate underlying infraspinatus tendinopathy.   3.Mild subscapularis tendinopathy.   4.Mild intra-articular biceps tendinopathy.   5.Degenerative fraying of the superior labrum.  -Prednisone: During 5/15/19 hospitalization for hyperglycemia, physician discontinued due to increased BG.   -Lidocaine patch?: ED visit 19 due to flank pain. Pt reports that during visit they applied lidocaine patch to her back and that is helped out greatly. Stated that an Rx was going to be sent for outpatient use.   -PT: Has been going to physical therapy. Reports range of motion difficulties, but states that she is trying.      Weight management: currently taking topiramate 75mg BID and Ozempic 0.5mg once weekly (changed to from Trulicity on 19 per weight clinic recommendation).  Wt Readings from Last 10 Encounters:   19 235 lb 8 oz (106.8 kg)   19 244 lb (110.7 kg)   19 241 lb (109.3 kg)   19 241 lb (109.3 kg)   05/15/19 242 lb 6.4 oz (110 kg)   05/15/19 236 lb 12.8 oz (107.4 kg)   05/15/19 236 lb 12.8 oz (107.4 kg)   19 240 lb (108.9 kg)   19 240 lb 8 oz (109.1 kg)   04/15/19 239 lb 4.8 oz (108.5 kg)     Schizoaffective: Currently taking sertraline 200mg daily, Invega 9mg daily, benztropine 1mg BID.   -Reports mood overall down. Sister recently passed away, went to  and feels some closure. States this is the first time she hasn't gone to the hospital after a death in the family. Able to joke at appointment today.   Not sleeping well. Wants to use her CPAP but put it back in the box. Continues to struggle with visual hallucinations and increased anxiety when trying to use CPAP.  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        Diabetes: Pt currently taking lantus 60u at bedtime, novolog 25u usually 2-3 times a day with meals, and semaglutide 0.5mg weekly. Pt is not experiencing side effects.  SMB times daily.  Still very interested in CGM. Ranges (from patient's group home glucose log):   Date FBG/ 2hours post Lunch/2hours post Dinner /2hours post    7/2 135 153     7/1 161 195 240/222     200 199 199/264     147 216 237/241     179 287 237/249     144 175 379/270     148 137 210/164      -DexCom Rx was sent in; pt reports that she has not heard anything back.   Patient is not experiencing hypoglycemia  Recent symptoms of high blood sugar? none  Eye exam: up to date  Foot exam: up to date  ACEi/ARB: No.   Urine Albumin:         Lab Results   Component Value Date     UMALCR 4.47 2019      Aspirin: Taking 81mg daily and denies side effects  Diet/Exercise: eating at [employer] less often           Lab Results   Component Value Date     A1C 7.6 05/15/2019     A1C 6.4 2018     A1C 6.4 2018     A1C 6.2 2018     A1C 6.8 2018      constipation: currently taking Miralax daily and Senna-S daily in the morning. States she has BM every day but stools have been hard. Doesn't want any changes to her medication regimen because doesn't want diarrhea.     Today's Vitals: LMP 2015   BP Readings from Last 1 Encounters:   19 132/70     Pulse Readings from Last 1 Encounters:   19 95     Wt Readings from Last 1 Encounters:   19 235 lb 8 oz (106.8 kg)     Ht Readings from Last 1 Encounters:   19 5' (1.524 m)     Estimated body mass index is 45.99 kg/m  as calculated from the following:    Height as of 19: 5' (1.524 m).    Weight as of an earlier encounter on 19: 235 lb 8 oz (106.8 kg).    Temp Readings from Last 1 Encounters:   19 97.1  F (36.2  C)        ASSESSMENT:              Current medications were reviewed today as discussed above.      Medication Adherence: good, no issues  identified    Shoulder pain: needs improvement. Discussed increasing gabapentin and pt willing to trial.     Weight management: improved. Pt willing to continue titration of Ozempic to max dose as recommended by weight loss clinic. Education provided and ordered today. Encouraged positive diet changes, less fast food.     schizoaffective: stable. Continue support with [facility] in center and [facility]. She will follow-up with Dr Brothers psychiatry for medication review as scheduled.    Diabetes: improved. A1c not at goal <7%. Increase Ozempic as above and will continue to look into insurance coverage for CGM.    constipation: needs improvement. Pt prefers not to change medication; education provided to increase senna to BID if needed.     PLAN:                  1. Increase Ozempic to 1mg weekly  2. Increase gabapentin to 900mg TID per PCP  3. Will call [facility] to follow-up on DexCom    I spent 40 minutes with this patient today. All changes were made via collaborative practice agreement with Rowena Haas. A copy of the visit note was provided to the patient's primary care provider.     Will follow up in 1 month.    The patient was given a summary of these recommendations as an after visit summary.    Eugenia De Jesus, PharmD, BCACP

## 2019-07-02 NOTE — PROGRESS NOTES
SUBJECTIVE:                                                    Nena Tang is a 59 year old female who presents to clinic today for the following health issues:  [facility]: [place]  MTM: Eugenia    Patient's sister recently passed away. Sad but reports that she is feeling better.     Diabetes Follow-up  Am blood sugars; 147-297; mid day 199-345 evening 199-300's one at 530; bedtime 222-403    How often are you checking your blood sugar? Four or more times daily    What time of day are you checking your blood sugars (select all that apply)?  Before meals    Have you had any blood sugars above 200?  Yes     Have you had any blood sugars below 70?  No    What symptoms do you notice when your blood sugar is low?  None    What concerns do you have today about your diabetes? None and Blood sugar is often over 200     Do you have any of these symptoms? (Select all that apply)  Numbness in feet, Excessive thirst, Blurry vision and Weight loss.      Have you had a diabetic eye exam in the last 12 months? No - pt has upcoming appt this month.     BP Readings from Last 2 Encounters:   06/04/19 108/72   05/29/19 111/58     Hemoglobin A1C (%)   Date Value   05/15/2019 7.6 (H)   12/20/2018 6.4 (H)     LDL Cholesterol Calculated (mg/dL)   Date Value   08/06/2018 84   06/27/2017 64       Diabetes Management Resources    Chronic Pain Follow-Up  Type / Location of Pain: back and shoulder   Analgesia/pain control:       Recent changes:  same      Overall control: Inadequate pain control  Activity level/function:      Daily activities:  Unable to perform most daily activities - chores, hobbies, social activities, driving     Work:  Unable to work  Adverse effects:  No  Adherance    Taking medication as directed?  Yes    Participating in other treatments: no   Risk Factors:    Sleep:  Fair    Mood/anxiety:  slightly worsened    Recent family or social stressors:  Yes     Other aggravating factors: certain movement   PHQ-9 SCORE 3/13/2018  10/26/2018 5/7/2019   PHQ-9 Total Score - - -   PHQ-9 Total Score - - -   PHQ-9 Total Score 14 20 22     TAMIA-7 SCORE 2/3/2017 3/13/2018 10/26/2018   Total Score - - -   Total Score 0 17 19   Total Score - - -     Encounter-Level CSA:    There are no encounter-level csa.     Patient-Level CSA:    There are no patient-level csa.           Mental Health Follow up:  Schizophrenia    Status since last visit: stable. States that this is the first time she has not gone to the hospital after a death in the family. [facility]. Taking her medications without any difficulties or concerns.     See PHQ-9 for current symptoms.  Other associated symptoms: None    Complicating factors: stress   Significant life event:  No   Current substance abuse:  None  Anxiety or Panic symptoms:  No    Sleep - plans to start using her CPAP at the next appointment date. Patient packed her CPAP back to its box.   Appetite - good.   Exercise - walking, trying to increase her activity level.     PHQ-9  English PHQ-9   Any Language               Problems taking medications regularly: No    Medication side effects: none    Diet: regular (no restrictions)    Social History     Tobacco Use     Smoking status: Never Smoker     Smokeless tobacco: Never Used   Substance Use Topics     Alcohol use: No     Frequency: Never     Comment: last month        Problem list and histories reviewed & adjusted, as indicated.  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 (H)     Illiterate     Irritable bowel syndrome     overweight - BMI >35     Takotsubo cardiomyopathy     CAD (coronary artery disease)     Restless legs syndrome (RLS)     CINDY (obstructive sleep apnea)- mild AHI 10.3     Verbal auditory hallucination      Chronic low back pain     Schizoaffective disorder, depressive type (H)     Migraine headache     HTN, goal below 140/90     Health Care Home     Lumbago     Cervicalgia     Cocaine abuse, episodic (H)     Suicidal ideation     Esophageal reflux     Mild nonproliferative diabetic retinopathy (H)     Tardive dyskinesia     Alcohol use     Left cataract     Falls frequently     History of uterine cancer     Psychophysiological insomnia     Dysuria     Asymptomatic postmenopausal status     Abdominal pain, right lower quadrant     Infectious encephalopathy     Non-intractable vomiting with nausea     Thoughts of self harm     Gastroenteritis     Posttraumatic stress disorder     Cervical cancer screening     Type 2 diabetes mellitus with stage 3 chronic kidney disease, with long-term current use of insulin (H)     Positive AIDA (antinuclear antibody)     Hyperglycemia     Past Surgical History:   Procedure Laterality Date     C OOPHORECTORMY FOR MALIG, W/BX  1983    UTERINE     CATARACT IOL, RT/LT Bilateral 2017     COLONOSCOPY N/A 3/16/2017    Procedure: COLONOSCOPY;  Surgeon: Traci Gonzalez MD;  Location: [facility]     Coronary CTA  5/21/2014     HYSTERECTOMY  1983    uterine cancer yearly pap's per provider.     LAPAROSCOPIC CHOLECYSTECTOMY  2008     PHACOEMULSIFICATION CLEAR CORNEA WITH STANDARD INTRAOCULAR LENS IMPLANT Left 5/5/2017    Procedure: PHACOEMULSIFICATION CLEAR CORNEA WITH STANDARD INTRAOCULAR LENS IMPLANT;  LEFT EYE PHACOEMULSIFICATION CLEAR CORNEA WITH STANDARD INTRAOCULAR LENS IMPLANT ;  Surgeon: Tyra Diaz MD;  Location:  [facility]     PHACOEMULSIFICATION CLEAR CORNEA WITH STANDARD INTRAOCULAR LENS IMPLANT Right 6/30/2017    Procedure: PHACOEMULSIFICATION CLEAR CORNEA WITH STANDARD INTRAOCULAR LENS IMPLANT;  RIGHT EYE PHACOEMULSIFICATION CLEAR CORNEA WITH STANDARD INTRAOCULAR LENS IMPLANT;  Surgeon: Tyra Diaz MD;  Location: [facility]     RELEASE TRIGGER FINGER  10/11/2012    Left  thumb. Procedure: RELEASE TRIGGER FINGER;  LEFT THUMB TRIGGER RELEASE;  Surgeon: Tay Langley MD;  Location:  [facility]     RELEASE TRIGGER FINGER Right 2016    Procedure: RELEASE TRIGGER FINGER;  Surgeon: Albino Castañeda MD;  Location:  [facility]       Social History     Tobacco Use     Smoking status: Never Smoker     Smokeless tobacco: Never Used   Substance Use Topics     Alcohol use: No     Frequency: Never     Comment: last month     Family History   Problem Relation Age of Onset     Cancer Mother         BLADDER     Respiratory Mother         COPD     Gastrointestinal Disease Mother         CIRRHOSIS OF LI BOLIVAR     Alcohol/Drug Mother      Diabetes Mother      Hypertension Mother      Lipids Mother      C.A.D. Mother      Glaucoma Mother      Alcohol/Drug Sister      Mental Illness Sister      Alcohol/Drug Sister      Psychotic Disorder Sister      Cancer Maternal Grandmother         UNKNOWN TYPE     Cancer Brother         COLON     Cancer - colorectal Brother         IN HIS LATE 30S     Alcohol/Drug Brother          OF HEROIN OVERDOSE AT AGE 22 YRS     Macular Degeneration No family hx of            Current Outpatient Medications   Medication Sig Dispense Refill     acetaminophen (TYLENOL) 500 MG tablet Take 2 tablets (1,000 mg) by mouth 3 times daily as needed for mild pain 100 tablet 3     albuterol (PROAIR HFA/PROVENTIL HFA/VENTOLIN HFA) 108 (90 Base) MCG/ACT Inhaler Inhale 2 puffs into the lungs every 6 hours as needed for shortness of breath / dyspnea or wheezing 1 Inhaler 0     aspirin (ASA) 81 MG EC tablet TAKE 1 TABLET (81MG) BY MOUTH DAILY 30 tablet 3     atorvastatin (LIPITOR) 80 MG tablet TAKE 1 TABLET (80MG) BY MOUTH DAILY 30 tablet 11     benztropine (COGENTIN) 0.5 MG tablet Take 2 tablets (1 mg) by mouth 2 times daily 120 tablet 3     blood glucose (NO BRAND SPECIFIED) test strip Use to test blood sugar  4 times daily or as directed. To accompany: Blood Glucose Monitor Brands: per  insurance. 100 strip 11     calcium carbonate (OS-ALLEN) 1500 (600 Ca) MG tablet Take 3 tablets (1,800 mg) by mouth daily 180 tablet 3     cholecalciferol (VITAMIN D3) 42942 units (1250 mcg) capsule TAKE 1 CAPSULE (50,000 UNITS) MONTHLY 4 capsule 3     Continuous Blood Gluc  (DEXCOM G6 ) RO 1 Device 4 times daily As directed for continuous glucose monitoring 1 Device 0     Continuous Blood Gluc Sensor (DEXCOM G6 SENSOR) MISC 1 Device every 10 days For continuous glucose monitoring 3 each 11     Continuous Blood Gluc Transmit (DEXCOM G6 TRANSMITTER) MISC 1 Device every 3 months 1 each 3     diclofenac (VOLTAREN) 1 % topical gel Place 4 g onto the skin 4 times daily as needed for moderate pain 1 Tube 1     gabapentin (NEURONTIN) 300 MG capsule Take 2 capsules (600 mg) by mouth 2 times daily morning and afternoon 120 capsule 1     gabapentin (NEURONTIN) 300 MG capsule Take 900 mg by mouth At Bedtime       glucose 4 G CHEW chewable tablet Take 2 every 15 minutes for blood sugar <70mg/dL. Recheck blood sugar every 15 minutes until above 70mg/dL, then eat a substantial meal. 20 tablet 1     guaiFENesin (ROBITUSSIN) 100 MG/5ML liquid Take 10 mLs (200 mg) by mouth every 4 hours as needed for cough 560 mL 1     insulin aspart (NOVOLOG FLEXPEN) 100 UNIT/ML pen Inject 25 Units Subcutaneous 3 times daily (with meals) Once daily, can add additional 5 units if BGs are >500mg/dL. 15 mL 11     insulin glargine (LANTUS SOLOSTAR PEN) 100 UNIT/ML pen Inject 60 Units Subcutaneous At Bedtime 3 mL 11     insulin pen needle (NOVOFINE 30) 30G X 8 MM miscellaneous USE 4 DAILY OR AS DIRECTED 400 each 0     ipratropium - albuterol 0.5 mg/2.5 mg/3 mL (DUONEB) 0.5-2.5 (3) MG/3ML neb solution Take 1 vial (3 mLs) by nebulization every 6 hours as needed for shortness of breath / dyspnea or wheezing 30 vial 0     leflunomide (ARAVA) 20 MG tablet Take 1 tablet (20 mg) by mouth daily 60 tablet 0     losartan (COZAAR) 50 MG tablet Take  1 tablet (50 mg) by mouth daily 90 tablet 3     melatonin 5 MG CAPS Take 2 capsules by mouth At Bedtime 180 capsule 3     metoprolol succinate ER (TOPROL-XL) 25 MG 24 hr tablet Take 1 tablet (25 mg) by mouth daily 90 tablet 1     nystatin (MYCOSTATIN) 175966 UNIT/GM external powder Apply topically once as needed for dry skin 60 g 3     order for DME Equipment being ordered: Depends. 30 each 1     order for DME Equipment being ordered: Freestyle Gabby Holman 1 Device 0     paliperidone ER (INVEGA) 9 MG 24 hr tablet Take 1 tablet (9 mg) by mouth every morning 30 tablet 2     polyethylene glycol (MIRALAX/GLYCOLAX) powder TAKE 8.5 GRAMS (1/2 CAPFUL) BY MOUTH EVERY OTHER DAY. 527 g 1     ranitidine (ZANTAC) 300 MG tablet TAKE 1 TABLET (300MG) BY MOUTH AT BEDTIME 90 tablet 1     Semaglutide (OZEMPIC) 0.25 or 0.5 MG/DOSE SOPN Inject 0.25 mg Subcutaneous once a week for 28 days, THEN 0.5 mg once a week for 28 days. 6 mL 1     senna-docusate (SENOKOT-S;PERICOLACE) 8.6-50 MG per tablet Take 1 tablet by mouth 2 times daily as needed for constipation 100 tablet 1     sertraline (ZOLOFT) 100 MG tablet Take 2 tablets (200 mg) by mouth daily 60 tablet 3     spacer (OPTICHAMBER PATRICIA) holding chamber Holding/spacer device to use with inhaler. 1 each 0     SUMAtriptan (IMITREX) 25 MG tablet Take 1-2 tablets (25-50 mg) by mouth at onset of headache for migraine May repeat in 2 hours. Max 8 tablets/24 hours. 12 tablet 1     topiramate (TOPAMAX) 50 MG tablet Take 1.5 tablets (75 mg) by mouth 2 times daily 90 tablet 3     Allergies   Allergen Reactions     Imidazole Antifungals Hives     Tolerates diflucan     Ketoprofen Itching     Pruritis to topical     Lisinopril Hives     Metformin Other (See Comments)     Patient hospitalized for lactic acidosis - admitting provider suspectd caused by metformin     Metronidazole Hives     Posaconazole Hives     Tolerates diflucan     Recent Labs   Lab Test 05/29/19  9112 05/24/19  5970   05/15/19  1822  12/20/18  1532  08/06/18  1038  06/27/17  1358  07/13/16  1350  07/14/15  1215   A1C  --   --   --  7.6*  --  6.4*  --  6.4*   < > 7.0*   < >  --    < > 9.1*   LDL  --   --   --   --   --   --   --  84  --  64  --  137*  --  78   HDL  --   --   --   --   --   --   --  46*  --  37*  --   --   --  39*   TRIG  --   --   --   --   --   --   --  215*  --  267*  --   --   --  151*   ALT 32 30  --  33   < >  --    < >  --    < > 32   < >  --    < >  --    CR 0.89 0.87   < > 0.93   < > 0.99   < >  --    < > 0.78   < >  --    < > 0.67   GFRESTIMATED 71 73   < > 67   < > 63   < >  --    < > 76   < >  --    < > >90  Non  GFR Calc     GFRESTBLACK 82 85   < > 78   < > 73   < >  --    < > >90   GFR Calc     < >  --    < > >90   GFR Calc     POTASSIUM 4.0 4.0   < > 4.3   < > 4.2   < >  --    < > 4.3   < >  --    < > 4.3   TSH  --  1.62  --   --   --  1.98  --   --    < >  --    < >  --    < >  --     < > = values in this interval not displayed.      BP Readings from Last 3 Encounters:   06/04/19 108/72   05/29/19 111/58   05/24/19 136/78    Wt Readings from Last 3 Encounters:   06/04/19 110.7 kg (244 lb)   05/24/19 109.3 kg (241 lb)   05/21/19 109.3 kg (241 lb)        Labs reviewed in EPIC  Problem list, Medication list, Allergies, and Medical/Social/Surgical histories reviewed in [facility] and updated as appropriate.     ROS: Constitutional, neuro, ENT, endocrine, pulmonary, cardiac, gastrointestinal, genitourinary, musculoskeletal, integument and psychiatric systems are negative, except as otherwise noted above in the HPI.     OBJECTIVE:                                                    /70   Pulse 95   Temp 97.1  F (36.2  C)   Resp 18   Wt 106.8 kg (235 lb 8 oz)   LMP 01/06/2015   SpO2 94%   BMI 45.99 kg/m    Body mass index is 45.99 kg/m .  EXAM:  Constitutional: healthy, alert and no distress   Cardiovascular: regular rhythm, no murmur or click.  "  Respiratory:Good diaphragmatic excursion. Lungs clear  NEURO: Gait normal. Reflexes normal and symmetric. Sensation grossly WNL.    Mental Status Assessment:  Appearance:   Appropriate   Eye Contact:   Good   Psychomotor Behavior: Normal   Attitude:   Cooperative   Orientation:   All  Speech   Rate / Production: Normal  Slow    Volume:  Normal   Mood:    Depressed  Sad   Affect:    Appropriate   Thought Content:  Clear   Thought Form:  Coherent  Logical   Insight:    Fair   Attention Span and Concentration: appropriate  Recent and Remote Memory:  intact  Fund of Knowledge: appropriate  Muscle Strength and Tone: normal   Suicidal Ideation: reports no thoughts, no intention  Hallucination: No    See [facility] notes     Diagnostic Test Results:  none      ASSESSMENT/PLAN:                                                    (E11.22,  N18.3,  Z79.4) Type 2 diabetes mellitus with stage 3 chronic kidney disease, with long-term current use of insulin (H)  (primary encounter diagnosis)  (E66.01) overweight - BMI >35  Comment: uncontrolled diabetes. Patient working closely with MTM and also endocrine to help with managing patient's weight. Recent increase in A1C.   Lab Results   Component Value Date    A1C 7.6 05/15/2019    A1C 6.4 12/20/2018    A1C 6.4 08/06/2018    A1C 6.2 05/22/2018    A1C 6.8 02/12/2018       Plan: Encouraged patient to increase her walking distances and also to stay active through out the day.   -discussed healthy eating habits; including less fast food and healthier snacks.   -Increased Ozempic to 1 mg every 7 days.     (F25.1) Schizoaffective disorder, depressive type (H)  Comment: Patient reports that her mood has been \" ok\" especially with a recent loss in the family. Patient is staying active and using the resources around her to keep strong mentally. She notes that this is the first time she has not needed to go to the hospital after a death in the family.   Plan: Continue with therapy at [facility] and also at the clinic during this crisis time.  -Follow-up in clinic as needed.      (M54.2) Cervicalgia  Comment: reports some increased pain and discomfort in her neck and also her shoulder area. Discussed increasing the Gabapentin dose to help with the pain and discomfort.   Plan: gabapentin (NEURONTIN) 300 MG capsule        Increase dose to 900 mg three times per day, will check back at the next visit if improving.     (G47.33) CINDY (obstructive sleep apnea)- mild AHI 10.3  Comment: Patient still struggling with using her CPAP due to her hallucinations. Patient is working toward using her CPAP daily.   Plan: Continue to offer support in the form of therapy to help with this process.     (I10) HTN, goal below 140/90  Comment: Patient denies any symptoms.   BP Readings from Last 3 Encounters:   07/02/19 132/70   06/04/19 108/72   05/29/19 111/58     Plan: continue with aspirin, atorvastatin, losartan and metoprolol with no changes.       Patient Instructions   -Increased Gabapentin to 900 mg three times per day. Prescription sent to [facility].   -Increase Ozempic to 1 mg every 7 days.     I spent Greater than 40 minutes was spent face to face with Nena Tang, with greater than 50 % in counselling and consultation about obesity, diabetes, CINDY, chronic neck pain, schizoaffective, and hypertension.       ART Fay CNP  [facility]

## 2019-07-03 DIAGNOSIS — F25.0 SCHIZOAFFECTIVE DISORDER, BIPOLAR TYPE (H): ICD-10-CM

## 2019-07-03 NOTE — TELEPHONE ENCOUNTER
Requested Prescriptions   Pending Prescriptions Disp Refills     paliperidone ER (INVEGA) 9 MG 24 hr tablet 30 tablet 2     Sig: Take 1 tablet (9 mg) by mouth every morning       There is no refill protocol information for this order              Last Written Prescription Date:  4/8/19  Last Fill Quantity: 30,   # refills: 2  Last Office Visit: 7/2/19  Future Office visit:    Next 5 appointments (look out 90 days)    Jul 05, 2019  9:40 AM CDT  Return Visit with Naveen Beltran DO  [facility] ([facility]) [address] [address]  [address]  [facility] [address]  [phone]   Aug 06, 2019  9:30 AM CDT  SHORT with Eugenia De Jesus [facility] ([facility]) [address] [place] [address]  [address] [place] [phone]   Aug 06, 2019  9:30 AM CDT  Return Visit with [facility] ([facility]) [address] [place] [address]  [address] [place] [address] [id number] [phone]   Aug 06, 2019  9:30 AM CDT  Return Visit with ART Arias [facility] ([facility]) [address] [place] [address]  [address] [place] [phone]           Routing refill request to provider for review/approval because:  Drug not on the [facility] [place], [facility] or [facility] refill protocol or controlled substance

## 2019-07-05 RX ORDER — PALIPERIDONE 9 MG/1
9 TABLET, EXTENDED RELEASE ORAL EVERY MORNING
Qty: 30 TABLET | Refills: 2 | Status: SHIPPED | OUTPATIENT
Start: 2019-07-05 | End: 2019-09-24

## 2019-07-10 ENCOUNTER — OFFICE VISIT (OUTPATIENT)
Dept: PSYCHIATRY | Facility: CLINIC | Age: 59
End: 2019-07-10
Payer: MEDICARE

## 2019-07-10 DIAGNOSIS — F25.0 SCHIZOAFFECTIVE DISORDER, BIPOLAR TYPE (H): Primary | ICD-10-CM

## 2019-07-10 PROCEDURE — 99213 OFFICE O/P EST LOW 20 MIN: CPT | Mod: GC | Performed by: PSYCHIATRY & NEUROLOGY

## 2019-07-10 RX ORDER — CLONAZEPAM 0.5 MG/1
0.5 TABLET ORAL AT BEDTIME
Qty: 30 TABLET | Refills: 0 | Status: SHIPPED | OUTPATIENT
Start: 2019-07-10 | End: 2019-08-15

## 2019-07-10 NOTE — PROGRESS NOTES
"PSYCHIATRIC  DIAGNOSTIC  EVALUATION  INTEGRATED PRIMARY CARE       60 minute evaluation    IDENTIFICATION   Nena Tang is a 59 year old female who was referred by Kraig Pedersen MD for evaluation of schizoaffective disorder.  History was provided by patient who was a fair historian.      CHIEF COMPLAINT     \" Meds must be working \"    HISTORY OF PRESENT ILLNESS     - Since last visit with Dr. Cornell, she feels her medications have been working  - Mood has been \"kind of down\" for the past month. Her sister passed two weeks ago due to cancer. She had been worsening over the past few months. She feels her depressed mood is due to her sister passing and her grieving. Has intermittent thoughts of being dead and whether she would see  family members. Has no intent at present.   - Sleep has been poor due to not using CPAP. She hasn't been using CPAP because there's a devil force in the room telling her not to use the CPAP. She wants to unpack the CPAP machine and start using it again.  - Devil force only talks to her at night. He appears with a black cape and red eyes. This has been going on for a year. It has sometimes told her to hurt herself but she does not follow this command. He is afraid of the light and so she frequently sleeps with a night light or the TV on but this keeps her up. Sometimes the devil force appears during the day when she takes naps. She frequently awakens when this happens and has to catch her breath. She feels that overall auditory hallucinations have been better since starting the Invega.   - Assisted Living Facility administers her medications.   - She goes to a drop in center during the day; she enjoys this: \"I'm pretty busy during the day. I ain't got time to hear no voices\". She also enjoys going to [employer]. Her son likes to take her.    Substance use:   Denies recent drug use.    PSYCH ROS: DEPRESSION:  reports-suicidal ideation without plan, without intent, self-destructive " thoughts, depressed mood, low energy, feeling worthless, feeling hopeless, excessive crying and overwhelmed;  DENIES- anhedonia, hypersomnia and excessive guilt  YING/HYPOMANIA:  reports-none;  DENIES- increased energy, decreased sleep need, increased activity and grandiosity  PSYCHOSIS:  reports-auditory hallucinations and visual hallucinations;  DENIES- disorganized behavior  PANIC ATTACK:  none   ANXIETY:  none  COMPULSIVE:  none    MEDICAL ROS:  Reports fatigue.  Denies sedation, headache, diaphoresis, tremor, akathisia, muscle problems [stiffness] and sialorrhea.     PSYCHIATRIC HISTORY   Historic Dx: Schizophrenia, Bipolar Disorder, Schizoaffective Disorder  SIB [method, most recent]- none  Suicidal Ideation Hx [passive, active]- Both passive and active, most recent years ago  Suicide Attempt [#, recent, method, regret, tx]:  #- N/A     Most Recent- N/A    Violence/Aggression Hx- none  Psychosis Hx- Paranoia, Visual hallucinations  Psych Hosp [ #, most recent, committed]- Numerous, approximately 12. No history of MH commitment.  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 Lives in [facility];  Children- Two adult sons, live in the [facility]  Trauma History (self-report)- none  Legal- none  Social/Spiritual Support- Sons, sister in the [facility]  "area, as well as a few friend  Early History/Education-  Grew up in [place]. 12th grade level of education. She was in special education classes.     FAMILY HISTORY                                                                       patient reported   Family Mental Health History-  Schizophrenia - Sister; Mom and Dad - Alcoholism    MEDICAL / SURGICAL HISTORY                                 MEDICAL TEAM:     PCPChris Pearson- [facility] -[facility]    PREGNANT or BREASTFEEDING:  NO    Neurologic Hx:   head injury- No     seizure- No      LOC- No     other- No    ALLERGY   Imidazole antifungals; Ketoprofen; Lisinopril; Metformin; Metronidazole; and Posaconazole    MENTAL STATUS EXAM                                                             Alertness: alert  and oriented  Appearance: adequately groomed  Behavior/Demeanor: cooperative, pleasant and calm, with good  eye contact  Speech: regular rate and rhythm  Language: intact  Psychomotor: mild dyskinetic movements of tongue  Mood:  \"Kind of down\"  Affect: full range; was congruent to mood; was congruent to content  Thought Process/Associations: unremarkable  Thought Content: Endorses passive suicidal ideation without plan or intent  Denies active suicidal ideation, violent ideation and psychotic thought   Perception: Endorses auditory and visual hallucinations when trying to fall asleep Denies   Insight: fair  Judgment: fair  Cognition:  does appear grossly intact; formal cognitive testing was not done  Gait and Station: unremarkable     PSYCHIATRIC DIAGNOSES                                                                                                   Schizoaffective Disorder    ASSESSMENT                                                    Nena TINY Tang is a 59 year old female who was referred by Kraig Pedersen MD for evaluation of schizoaffective disorder.      DISCUSSION: Nena's report of a \"devil force\" only appearing at " night or when she is trying to nap during the day is highly suggestive of hypnagogic hallucinations in the context of untreated CINDY, particularly given that she does not appear to experience auditory or visual hallucinations during other activities or at other times of day. She may respond well to low dose clonazepam. While it is possible that she may have residual psychosis, would be very hesitant to add or change antipsychotic at this time given mild TD. In future, could consider transition to [facility] Invega, although [facility] staff does administer her medications. Regarding her mood symptoms, her depression symptoms in the context of her sister's recent passing are consistent with normal grief response. Suicidal ideation is passive, without intent and more linked to desire to be with  family members and sadness that they have passed. If suicidal ideation persists, could consider lithium given its known benefit with regard to reduction of suicidality.    Today Nena Tang reports passive SI without intent. In addition, she has notable risk factors for self-harm, including single status, psychosis and recent loss of sister. However, risk is mitigated by commitment to family, Pentec[employer]al beliefs, sobriety, ability to volunteer a safety plan and history of seeking help when needed. Therefore, based on all available evidence including the factors cited above, she does not appear to be at imminent risk for self-harm, does not meet criteria for a 72-hr hold, and therefore remains appropriate for ongoing outpatient level of care.      PLAN                                                                                                       1) MEDICATION:              - Start Clonazepam 0.5mg at bedtime       - Continue Sertraline 200mg daily       - Continue Invega 9mg daily              - Continue Benztropine 1mg twice daily    2) THERAPY:  Continue    3) Notes for PCP: Consider Invega Sustenna Injection, Consider  Lithium for suicidal ideation     4) RTC: 1 month    RESIDENT:  Rob Campa MD    Patient was staffed in clinic with Dr. Brothers who will sign the note.I, Dr. Brothers, had an opportunity to interview this patient with the resident and was present for key portions of the exam. I agree with the assessment and plan outlined above.     On my interview the patient was dressed in casual clothing and appeared stated age. Patient was pleasant and cooperative, mood somewhat dysphoric, affect restricted,mild oral buckle dyskinesia,, speech was coherent and goal oriented. Associations tight. Thought process was logical and linear. Content of thought with visual hallucinations and chronic suicidal ideation without intent.Patient alert and oriented x 3.  Recent and remote memory, concentration, fund of knowledge, and use of language were within normal limits. Insight and judgement intact. Gait and station within normal limits. I am concerned that her night time hallucinations are secondary to sleep disorder(being haaged)    Dr. JAIR rBothers

## 2019-07-11 RX ORDER — PROCHLORPERAZINE 25 MG/1
1 SUPPOSITORY RECTAL
Qty: 3 EACH | Refills: 11 | Status: SHIPPED | OUTPATIENT
Start: 2019-07-11 | End: 2019-07-28

## 2019-07-11 RX ORDER — PROCHLORPERAZINE 25 MG/1
1 SUPPOSITORY RECTAL
Qty: 1 EACH | Refills: 3 | Status: SHIPPED | OUTPATIENT
Start: 2019-07-11 | End: 2019-07-28

## 2019-07-11 RX ORDER — PROCHLORPERAZINE 25 MG/1
1 SUPPOSITORY RECTAL 4 TIMES DAILY
Qty: 1 DEVICE | Refills: 0 | Status: SHIPPED | OUTPATIENT
Start: 2019-07-11 | End: 2019-07-28

## 2019-07-11 NOTE — PROGRESS NOTES
Per Jama, unable to fill DexCom.  Appears only mail order pharmacies are able to fill at this time. Discussed with pt when she was in clinic for her psychiatry appointment and agreeable to attempt to fill at [facility]. Rx sent.    Eugenia De Jesus, PharmD, BCACP

## 2019-07-15 DIAGNOSIS — E11.3299 TYPE 2 DIABETES MELLITUS WITH MILD NONPROLIFERATIVE RETINOPATHY WITHOUT MACULAR EDEMA, WITH LONG-TERM CURRENT USE OF INSULIN, UNSPECIFIED LATERALITY (H): ICD-10-CM

## 2019-07-15 DIAGNOSIS — Z79.4 TYPE 2 DIABETES MELLITUS WITH MILD NONPROLIFERATIVE RETINOPATHY WITHOUT MACULAR EDEMA, WITH LONG-TERM CURRENT USE OF INSULIN, UNSPECIFIED LATERALITY (H): ICD-10-CM

## 2019-07-18 NOTE — DISCHARGE INSTRUCTIONS
Behavioral Discharge Planning and Instructions      Summary:  You were admitted on 1/14/2018  due to decompensation and needing medication adjustments.  You were treated by Dr. Cristian Avalos MD and discharged on  from [facility] to the [facility].       Principal Diagnosis: Schizoaffective      Health Care Follow-up Appointments:  Psychiatrist Hillary Winkelmann - Next Appt 1:20pm Tuesday, 1/23/18  [facility]   [place], [place]  [phone]  Fax [phone]    Therapist Liliana Miller (Group Home will schedule for her.)  [facility]  [address]  [place], [place]  [phone]    PCP Rowena CORDOVA [facility]     Attend all scheduled appointments with your outpatient providers. Call at least 24 hours in advance if you need to reschedule an appointment to ensure continued access to your outpatient providers.   Major Treatments, Procedures and Findings:  You were provided with: a psychiatric assessment, assessed for medical stability, medication evaluation and/or management, group therapy and milieu management    Symptoms to Report: increased confusion, losing more sleep, mood getting worse or thoughts of suicide    Early warning signs can include: increased depression or anxiety sleep disturbances increased thoughts or behaviors of suicide or self-harm  increased unusual thinking, such as paranoia or hearing voices    Safety and Wellness:  Take all medicines as directed.  Make no changes unless your doctor suggests them.      Follow treatment recommendations.  Refrain from alcohol and non-prescribed drugs.  If there is a concern for safety, call 911.    Resources:   Crisis Intervention: 304.231.3832 or 212-128-6885 (TTY: 261.415.5484).  Call anytime for help.  [employer] [place] [employer] Mental Illness (www.[place].marah.org): 869.760.9353 or 453-568-0246.    The treatment team has appreciated the  opportunity to work with you.     If you have any questions or concerns our unit number is [phone].   No

## 2019-07-22 ENCOUNTER — OFFICE VISIT (OUTPATIENT)
Dept: ENDOCRINOLOGY | Facility: CLINIC | Age: 59
End: 2019-07-22
Payer: MEDICARE

## 2019-07-22 VITALS
DIASTOLIC BLOOD PRESSURE: 70 MMHG | OXYGEN SATURATION: 95 % | BODY MASS INDEX: 46.45 KG/M2 | HEART RATE: 81 BPM | SYSTOLIC BLOOD PRESSURE: 137 MMHG | WEIGHT: 236.6 LBS | HEIGHT: 60 IN

## 2019-07-22 DIAGNOSIS — E66.01 MORBID OBESITY (H): Primary | ICD-10-CM

## 2019-07-22 ASSESSMENT — MIFFLIN-ST. JEOR: SCORE: 1574.71

## 2019-07-22 NOTE — LETTER
"2019       RE: Nena aTng  [address] [address]     Dear Colleague,    Thank you for referring your patient, Nena Tang, to the [facility] MEDICAL WEIGHT MANAGEMENT at [facility]. Please see a copy of my visit note below.    Return Medical Weight Management Note     Nena Tang  MRN:  [id number]  :  1960  GINNY:  2019    Dear Rowena Haas,    I had the pleasure of seeing your patient Nena Tang.  She is a 59 year old female who I am continuing to see for treatment of obesity related to:       2018   I have the following co-morbidities associated with obesity: Type II Diabetes, Pre-Diabetes, Heart Disease, High Blood Pressure, High Cholesterol       INTERVAL HISTORY:  [facility] follow up.    Last saw Lauren Bloch [facility] 5/15/19. She transitioned from Trulicity to Ozempic. Last visit increased ozempic to 0.5mg weekly and increased basaglar to 54 units daily. Saw [facility] on  and increased ozempic to 1mg daily  She lives in an assisted living facility and they help her with taking ozempic. Some mild constipation relieved with miralax but otherwise tolerating it well.     ED visit 5/15/19 for hyperglycemia and basaglar increased to 60 units  Novolog 25 units with meals  Blood sugars recently have been improved.  Fasting in the am ~150's  Blood sugars in the low 100's prior to meals    She feels ozempic is helping her to decrease her hunger and helping her to eat smaller portions.  She was 244 lbs in  and is 236 lbs today.     A1C May 7.6    CURRENT WEIGHT:   236 lbs 9.6 oz    Wt Readings from Last 4 Encounters:   19 107.3 kg (236 lb 9.6 oz)   19 106.8 kg (235 lb 8 oz)   19 110.7 kg (244 lb)   19 109.3 kg (241 lb)       Height:  5' 0\"  Body Mass Index:  Body mass index is 46.21 kg/m .  Vitals:  /70   Pulse 81   Ht 1.524 m (5')   Wt 107.3 kg (236 lb 9.6 oz)   LMP 2015   SpO2 95%   BMI " 46.21 kg/m         Initial consult weight was 237 on 6/7/18.  Weight change since last seen on 5/15/19 with MTM is down 0 pounds.   Total loss is 1 pounds.    Diet and Activity Changes Since Last Visit Reviewed With Patient 7/22/2019   I have made the following changes to my diet since my last visit: eating less   With regards to my diet, I am still struggling with: none   I have made the following changes to my activity/exercise since my last visit: trying to walk more   With regards to my activity/exercise, I am still struggling with: no       MEDICATIONS:   Current Outpatient Medications   Medication     acetaminophen (TYLENOL) 500 MG tablet     albuterol (PROAIR HFA/PROVENTIL HFA/VENTOLIN HFA) 108 (90 Base) MCG/ACT Inhaler     aspirin (ASA) 81 MG EC tablet     atorvastatin (LIPITOR) 80 MG tablet     benztropine (COGENTIN) 0.5 MG tablet     blood glucose (NO BRAND SPECIFIED) test strip     calcium carbonate (OS-ALLEN) 1500 (600 Ca) MG tablet     cholecalciferol (VITAMIN D3) 54668 units (1250 mcg) capsule     clonazePAM (KLONOPIN) 0.5 MG tablet     Continuous Blood Gluc  (DEXCOM G6 ) RO     Continuous Blood Gluc Sensor (DEXCOM G6 SENSOR) MISC     Continuous Blood Gluc Transmit (DEXCOM G6 TRANSMITTER) MISC     diclofenac (VOLTAREN) 1 % topical gel     gabapentin (NEURONTIN) 300 MG capsule     glucose 4 G CHEW chewable tablet     guaiFENesin (ROBITUSSIN) 100 MG/5ML liquid     insulin aspart (NOVOLOG FLEXPEN) 100 UNIT/ML pen     insulin glargine (LANTUS SOLOSTAR PEN) 100 UNIT/ML pen     insulin pen needle (NOVOFINE 30) 30G X 8 MM miscellaneous     ipratropium - albuterol 0.5 mg/2.5 mg/3 mL (DUONEB) 0.5-2.5 (3) MG/3ML neb solution     losartan (COZAAR) 50 MG tablet     melatonin 5 MG CAPS     metoprolol succinate ER (TOPROL-XL) 25 MG 24 hr tablet     nystatin (MYCOSTATIN) 382193 UNIT/GM external powder     order for DME     order for DME     paliperidone ER (INVEGA) 9 MG 24 hr tablet     polyethylene  glycol (MIRALAX/GLYCOLAX) powder     ranitidine (ZANTAC) 300 MG tablet     semaglutide (OZEMPIC) 1 MG/DOSE pen     senna-docusate (SENOKOT-S;PERICOLACE) 8.6-50 MG per tablet     sertraline (ZOLOFT) 100 MG tablet     spacer (OPTICHAMBER PATRICIA) holding chamber     SUMAtriptan (IMITREX) 25 MG tablet     topiramate (TOPAMAX) 50 MG tablet     No current facility-administered medications for this visit.        Weight Loss Medication History Reviewed With Patient 7/22/2019   Which weight loss medications are you currently taking on a regular basis?  Topamax (topiramate)   Are you having any side effects from the weight loss medication that we have prescribed you? No       ASSESSMENT/PLAN:    Continue ozempic 1mg weekly  Follow up endocrine and MTM pharmacist as planned already      FOLLOW-UP:    12 weeks see Debbie Germain return MWM    Time: 15 min spent on evaluation, management, counseling, education, & motivational interviewing with greater than 50 % of the total time was spent on counseling and coordinating care    Sincerely,    Debbie Germain PA-C

## 2019-07-22 NOTE — PROGRESS NOTES
"    Return Medical Weight Management Note     Nena Tang  MRN:  [id number]  :  1960  GINNY:  2019    Dear Rowena Haas,    I had the pleasure of seeing your patient Nena Tang.  She is a 59 year old female who I am continuing to see for treatment of obesity related to:       2018   I have the following co-morbidities associated with obesity: Type II Diabetes, Pre-Diabetes, Heart Disease, High Blood Pressure, High Cholesterol       INTERVAL HISTORY:  MWM follow up.    Last saw Lauren Bloch [facility] 5/15/19. She transitioned from Trulicity to Ozempic. Last visit increased ozempic to 0.5mg weekly and increased basaglar to 54 units daily. Saw MTM on  and increased ozempic to 1mg daily  She lives in an assisted living facility and they help her with taking ozempic. Some mild constipation relieved with miralax but otherwise tolerating it well.     ED visit 5/15/19 for hyperglycemia and basaglar increased to 60 units  Novolog 25 units with meals  Blood sugars recently have been improved.  Fasting in the am ~150's  Blood sugars in the low 100's prior to meals    She feels ozempic is helping her to decrease her hunger and helping her to eat smaller portions.  She was 244 lbs in  and is 236 lbs today.     A1C May 7.6    CURRENT WEIGHT:   236 lbs 9.6 oz    Wt Readings from Last 4 Encounters:   19 107.3 kg (236 lb 9.6 oz)   19 106.8 kg (235 lb 8 oz)   19 110.7 kg (244 lb)   19 109.3 kg (241 lb)       Height:  5' 0\"  Body Mass Index:  Body mass index is 46.21 kg/m .  Vitals:  /70   Pulse 81   Ht 1.524 m (5')   Wt 107.3 kg (236 lb 9.6 oz)   LMP 2015   SpO2 95%   BMI 46.21 kg/m        Initial consult weight was 237 on 18.  Weight change since last seen on 5/15/19 with MTM is down 0 pounds.   Total loss is 1 pounds.    Diet and Activity Changes Since Last Visit Reviewed With Patient 2019   I have made the following changes to my diet since my last " visit: eating less   With regards to my diet, I am still struggling with: none   I have made the following changes to my activity/exercise since my last visit: trying to walk more   With regards to my activity/exercise, I am still struggling with: no       MEDICATIONS:   Current Outpatient Medications   Medication     acetaminophen (TYLENOL) 500 MG tablet     albuterol (PROAIR HFA/PROVENTIL HFA/VENTOLIN HFA) 108 (90 Base) MCG/ACT Inhaler     aspirin (ASA) 81 MG EC tablet     atorvastatin (LIPITOR) 80 MG tablet     benztropine (COGENTIN) 0.5 MG tablet     blood glucose (NO BRAND SPECIFIED) test strip     calcium carbonate (OS-ALLEN) 1500 (600 Ca) MG tablet     cholecalciferol (VITAMIN D3) 54090 units (1250 mcg) capsule     clonazePAM (KLONOPIN) 0.5 MG tablet     Continuous Blood Gluc  (DEXCOM G6 ) RO     Continuous Blood Gluc Sensor (DEXCOM G6 SENSOR) MISC     Continuous Blood Gluc Transmit (DEXCOM G6 TRANSMITTER) MISC     diclofenac (VOLTAREN) 1 % topical gel     gabapentin (NEURONTIN) 300 MG capsule     glucose 4 G CHEW chewable tablet     guaiFENesin (ROBITUSSIN) 100 MG/5ML liquid     insulin aspart (NOVOLOG FLEXPEN) 100 UNIT/ML pen     insulin glargine (LANTUS SOLOSTAR PEN) 100 UNIT/ML pen     insulin pen needle (NOVOFINE 30) 30G X 8 MM miscellaneous     ipratropium - albuterol 0.5 mg/2.5 mg/3 mL (DUONEB) 0.5-2.5 (3) MG/3ML neb solution     losartan (COZAAR) 50 MG tablet     melatonin 5 MG CAPS     metoprolol succinate ER (TOPROL-XL) 25 MG 24 hr tablet     nystatin (MYCOSTATIN) 232470 UNIT/GM external powder     order for DME     order for DME     paliperidone ER (INVEGA) 9 MG 24 hr tablet     polyethylene glycol (MIRALAX/GLYCOLAX) powder     ranitidine (ZANTAC) 300 MG tablet     semaglutide (OZEMPIC) 1 MG/DOSE pen     senna-docusate (SENOKOT-S;PERICOLACE) 8.6-50 MG per tablet     sertraline (ZOLOFT) 100 MG tablet     spacer (OPTICHAMBER PATRICIA) holding chamber     SUMAtriptan (IMITREX) 25 MG  tablet     topiramate (TOPAMAX) 50 MG tablet     No current facility-administered medications for this visit.        Weight Loss Medication History Reviewed With Patient 7/22/2019   Which weight loss medications are you currently taking on a regular basis?  Topamax (topiramate)   Are you having any side effects from the weight loss medication that we have prescribed you? No       ASSESSMENT/PLAN:    Continue ozempic 1mg weekly  Follow up endocrine and MTM pharmacist as planned already      FOLLOW-UP:    12 weeks see Debbie Germain return MW    Time: 15 min spent on evaluation, management, counseling, education, & motivational interviewing with greater than 50 % of the total time was spent on counseling and coordinating care    Sincerely,    Debbie Germain PA-C

## 2019-07-22 NOTE — NURSING NOTE
Chief Complaint   Patient presents with     Weight Problem     RMWM     Vitals:    07/22/19 1040   BP: 137/70   Pulse: 81   SpO2: 95%   Weight: 107.3 kg (236 lb 9.6 oz)   Height: 1.524 m (5')     Body mass index is 46.21 kg/m .  Татьяна Munoz CMA

## 2019-07-25 ENCOUNTER — TELEPHONE (OUTPATIENT)
Dept: FAMILY MEDICINE | Facility: CLINIC | Age: 59
End: 2019-07-25

## 2019-07-25 NOTE — TELEPHONE ENCOUNTER
Reason for Call:  Form, our goal is to have forms completed with 72 hours, however, some forms may require a visit or additional information.    Type of letter, form or note:  medical    Who is the form from?: Patient    Where did the form come from: Patient or family brought in       What clinic location was the form placed at?: [facility]    Where the form was placed: Dr. Kelley/Folder    What number is listed as a contact on the form?: Fax: [phone]       Additional comments: Fax form to the number above when completed.     Call taken on 7/25/2019 at 4:50 PM by Maria Elena Olsen

## 2019-07-26 DIAGNOSIS — Z79.4 TYPE 2 DIABETES MELLITUS WITH MILD NONPROLIFERATIVE RETINOPATHY WITHOUT MACULAR EDEMA, WITH LONG-TERM CURRENT USE OF INSULIN, UNSPECIFIED LATERALITY (H): Primary | ICD-10-CM

## 2019-07-26 DIAGNOSIS — E11.3299 TYPE 2 DIABETES MELLITUS WITH MILD NONPROLIFERATIVE RETINOPATHY WITHOUT MACULAR EDEMA, WITH LONG-TERM CURRENT USE OF INSULIN, UNSPECIFIED LATERALITY (H): Primary | ICD-10-CM

## 2019-07-26 RX ORDER — BLOOD-GLUCOSE,RECEIVER,CONT
1 EACH MISCELLANEOUS 4 TIMES DAILY
Qty: 1 DEVICE | Refills: 0 | Status: SHIPPED | OUTPATIENT
Start: 2019-07-26 | End: 2019-07-28

## 2019-07-26 RX ORDER — BLOOD-GLUCOSE TRANSMITTER
1 EACH MISCELLANEOUS 4 TIMES DAILY
Qty: 1 EACH | Refills: 3 | Status: SHIPPED | OUTPATIENT
Start: 2019-07-26 | End: 2019-07-28

## 2019-07-26 RX ORDER — BLOOD-GLUCOSE SENSOR
1 EACH MISCELLANEOUS
Qty: 4 EACH | Refills: 11 | Status: SHIPPED | OUTPATIENT
Start: 2019-07-26 | End: 2019-07-28

## 2019-07-26 NOTE — TELEPHONE ENCOUNTER
Faxed completed paperwork to [phone]    Karla Vargas, [facility]

## 2019-07-28 ENCOUNTER — APPOINTMENT (OUTPATIENT)
Dept: GENERAL RADIOLOGY | Facility: CLINIC | Age: 59
DRG: 871 | End: 2019-07-28
Attending: PHYSICIAN ASSISTANT
Payer: MEDICARE

## 2019-07-28 ENCOUNTER — HOSPITAL ENCOUNTER (INPATIENT)
Facility: CLINIC | Age: 59
LOS: 3 days | Discharge: NURSING, REHAB OR LONG-TERM CARE | DRG: 871 | End: 2019-07-31
Attending: PHYSICIAN ASSISTANT | Admitting: INTERNAL MEDICINE
Payer: MEDICARE

## 2019-07-28 DIAGNOSIS — J18.9 PNEUMONIA: ICD-10-CM

## 2019-07-28 DIAGNOSIS — M54.50 CHRONIC RIGHT-SIDED LOW BACK PAIN WITHOUT SCIATICA: Primary | ICD-10-CM

## 2019-07-28 DIAGNOSIS — M54.40 ACUTE LEFT-SIDED LOW BACK PAIN WITH SCIATICA, SCIATICA LATERALITY UNSPECIFIED: ICD-10-CM

## 2019-07-28 DIAGNOSIS — G89.29 CHRONIC RIGHT-SIDED LOW BACK PAIN WITHOUT SCIATICA: Primary | ICD-10-CM

## 2019-07-28 DIAGNOSIS — A41.9 SEPSIS (H): ICD-10-CM

## 2019-07-28 DIAGNOSIS — J18.9 PNEUMONIA OF LEFT LOWER LOBE DUE TO INFECTIOUS ORGANISM: ICD-10-CM

## 2019-07-28 DIAGNOSIS — G43.109 MIGRAINE WITH AURA AND WITHOUT STATUS MIGRAINOSUS, NOT INTRACTABLE: ICD-10-CM

## 2019-07-28 DIAGNOSIS — R05.9 COUGH: ICD-10-CM

## 2019-07-28 DIAGNOSIS — K59.03 DRUG-INDUCED CONSTIPATION: ICD-10-CM

## 2019-07-28 LAB
ALBUMIN SERPL-MCNC: 3.9 G/DL (ref 3.4–5)
ALBUMIN UR-MCNC: NEGATIVE MG/DL
ALP SERPL-CCNC: 97 U/L (ref 40–150)
ALT SERPL W P-5'-P-CCNC: 23 U/L (ref 0–50)
ANION GAP SERPL CALCULATED.3IONS-SCNC: 7 MMOL/L (ref 3–14)
APPEARANCE UR: CLEAR
AST SERPL W P-5'-P-CCNC: 17 U/L (ref 0–45)
BASOPHILS # BLD AUTO: 0.1 10E9/L (ref 0–0.2)
BASOPHILS NFR BLD AUTO: 0.3 %
BILIRUB SERPL-MCNC: 0.2 MG/DL (ref 0.2–1.3)
BILIRUB UR QL STRIP: NEGATIVE
BUN SERPL-MCNC: 30 MG/DL (ref 7–30)
C DIFF TOX B STL QL: NEGATIVE
CALCIUM SERPL-MCNC: 9.3 MG/DL (ref 8.5–10.1)
CHLORIDE SERPL-SCNC: 108 MMOL/L (ref 94–109)
CO2 SERPL-SCNC: 24 MMOL/L (ref 20–32)
COLOR UR AUTO: ABNORMAL
CREAT SERPL-MCNC: 1.13 MG/DL (ref 0.52–1.04)
DIFFERENTIAL METHOD BLD: ABNORMAL
EOSINOPHIL # BLD AUTO: 0 10E9/L (ref 0–0.7)
EOSINOPHIL NFR BLD AUTO: 0.2 %
ERYTHROCYTE [DISTWIDTH] IN BLOOD BY AUTOMATED COUNT: 12.9 % (ref 10–15)
GFR SERPL CREATININE-BSD FRML MDRD: 53 ML/MIN/{1.73_M2}
GLUCOSE BLDC GLUCOMTR-MCNC: 113 MG/DL (ref 70–99)
GLUCOSE BLDC GLUCOMTR-MCNC: 113 MG/DL (ref 70–99)
GLUCOSE BLDC GLUCOMTR-MCNC: 145 MG/DL (ref 70–99)
GLUCOSE BLDC GLUCOMTR-MCNC: 63 MG/DL (ref 70–99)
GLUCOSE BLDC GLUCOMTR-MCNC: 90 MG/DL (ref 70–99)
GLUCOSE SERPL-MCNC: 112 MG/DL (ref 70–99)
GLUCOSE UR STRIP-MCNC: NEGATIVE MG/DL
HCT VFR BLD AUTO: 37.6 % (ref 35–47)
HGB BLD-MCNC: 11.7 G/DL (ref 11.7–15.7)
HGB UR QL STRIP: NEGATIVE
HYALINE CASTS #/AREA URNS LPF: 1 /LPF (ref 0–2)
IMM GRANULOCYTES # BLD: 0.1 10E9/L (ref 0–0.4)
IMM GRANULOCYTES NFR BLD: 0.5 %
KETONES UR STRIP-MCNC: NEGATIVE MG/DL
LACTATE BLD-SCNC: 2.2 MMOL/L (ref 0.7–2)
LACTATE BLD-SCNC: 3.3 MMOL/L (ref 0.7–2)
LEUKOCYTE ESTERASE UR QL STRIP: NEGATIVE
LYMPHOCYTES # BLD AUTO: 2 10E9/L (ref 0.8–5.3)
LYMPHOCYTES NFR BLD AUTO: 12.2 %
MAGNESIUM SERPL-MCNC: 1.9 MG/DL (ref 1.6–2.3)
MCH RBC QN AUTO: 30.5 PG (ref 26.5–33)
MCHC RBC AUTO-ENTMCNC: 31.1 G/DL (ref 31.5–36.5)
MCV RBC AUTO: 98 FL (ref 78–100)
MONOCYTES # BLD AUTO: 0.8 10E9/L (ref 0–1.3)
MONOCYTES NFR BLD AUTO: 4.8 %
MUCOUS THREADS #/AREA URNS LPF: PRESENT /LPF
NEUTROPHILS # BLD AUTO: 13.4 10E9/L (ref 1.6–8.3)
NEUTROPHILS NFR BLD AUTO: 82 %
NITRATE UR QL: NEGATIVE
NRBC # BLD AUTO: 0 10*3/UL
NRBC BLD AUTO-RTO: 0 /100
NT-PROBNP SERPL-MCNC: 43 PG/ML (ref 0–900)
PH UR STRIP: 6 PH (ref 5–7)
PLATELET # BLD AUTO: 224 10E9/L (ref 150–450)
POTASSIUM SERPL-SCNC: 4.1 MMOL/L (ref 3.4–5.3)
PROT SERPL-MCNC: 8.3 G/DL (ref 6.8–8.8)
RBC # BLD AUTO: 3.83 10E12/L (ref 3.8–5.2)
RBC #/AREA URNS AUTO: 1 /HPF (ref 0–2)
SODIUM SERPL-SCNC: 139 MMOL/L (ref 133–144)
SOURCE: ABNORMAL
SP GR UR STRIP: 1.02 (ref 1–1.03)
SPECIMEN SOURCE: NORMAL
SQUAMOUS #/AREA URNS AUTO: 1 /HPF (ref 0–1)
TROPONIN I SERPL-MCNC: <0.015 UG/L (ref 0–0.04)
UROBILINOGEN UR STRIP-MCNC: NORMAL MG/DL (ref 0–2)
WBC # BLD AUTO: 16.4 10E9/L (ref 4–11)
WBC #/AREA URNS AUTO: <1 /HPF (ref 0–5)

## 2019-07-28 PROCEDURE — 00000146 ZZHCL STATISTIC GLUCOSE BY METER IP

## 2019-07-28 PROCEDURE — 25800030 ZZH RX IP 258 OP 636

## 2019-07-28 PROCEDURE — 96365 THER/PROPH/DIAG IV INF INIT: CPT

## 2019-07-28 PROCEDURE — 99223 1ST HOSP IP/OBS HIGH 75: CPT | Performed by: INTERNAL MEDICINE

## 2019-07-28 PROCEDURE — 25000132 ZZH RX MED GY IP 250 OP 250 PS 637: Mod: GY | Performed by: INTERNAL MEDICINE

## 2019-07-28 PROCEDURE — 25000131 ZZH RX MED GY IP 250 OP 636 PS 637: Mod: GY | Performed by: INTERNAL MEDICINE

## 2019-07-28 PROCEDURE — 87493 C DIFF AMPLIFIED PROBE: CPT | Performed by: INTERNAL MEDICINE

## 2019-07-28 PROCEDURE — 96375 TX/PRO/DX INJ NEW DRUG ADDON: CPT

## 2019-07-28 PROCEDURE — 87040 BLOOD CULTURE FOR BACTERIA: CPT | Performed by: PHYSICIAN ASSISTANT

## 2019-07-28 PROCEDURE — 96367 TX/PROPH/DG ADDL SEQ IV INF: CPT

## 2019-07-28 PROCEDURE — 93005 ELECTROCARDIOGRAM TRACING: CPT

## 2019-07-28 PROCEDURE — 25000128 H RX IP 250 OP 636: Performed by: PHYSICIAN ASSISTANT

## 2019-07-28 PROCEDURE — 25000128 H RX IP 250 OP 636

## 2019-07-28 PROCEDURE — 80053 COMPREHEN METABOLIC PANEL: CPT | Performed by: PHYSICIAN ASSISTANT

## 2019-07-28 PROCEDURE — 84484 ASSAY OF TROPONIN QUANT: CPT | Performed by: PHYSICIAN ASSISTANT

## 2019-07-28 PROCEDURE — 83735 ASSAY OF MAGNESIUM: CPT | Performed by: PHYSICIAN ASSISTANT

## 2019-07-28 PROCEDURE — 85025 COMPLETE CBC W/AUTO DIFF WBC: CPT | Performed by: PHYSICIAN ASSISTANT

## 2019-07-28 PROCEDURE — 25000125 ZZHC RX 250: Performed by: INTERNAL MEDICINE

## 2019-07-28 PROCEDURE — 36415 COLL VENOUS BLD VENIPUNCTURE: CPT

## 2019-07-28 PROCEDURE — 25000128 H RX IP 250 OP 636: Performed by: INTERNAL MEDICINE

## 2019-07-28 PROCEDURE — 71046 X-RAY EXAM CHEST 2 VIEWS: CPT

## 2019-07-28 PROCEDURE — 81001 URINALYSIS AUTO W/SCOPE: CPT | Performed by: PHYSICIAN ASSISTANT

## 2019-07-28 PROCEDURE — 25000132 ZZH RX MED GY IP 250 OP 250 PS 637: Mod: GY | Performed by: PHYSICIAN ASSISTANT

## 2019-07-28 PROCEDURE — 40000275 ZZH STATISTIC RCP TIME EA 10 MIN

## 2019-07-28 PROCEDURE — 83880 ASSAY OF NATRIURETIC PEPTIDE: CPT | Performed by: PHYSICIAN ASSISTANT

## 2019-07-28 PROCEDURE — 94640 AIRWAY INHALATION TREATMENT: CPT

## 2019-07-28 PROCEDURE — 40000274 ZZH STATISTIC RCP CONSULT EA 30 MIN

## 2019-07-28 PROCEDURE — 25800030 ZZH RX IP 258 OP 636: Performed by: INTERNAL MEDICINE

## 2019-07-28 PROCEDURE — 12000000 ZZH R&B MED SURG/OB

## 2019-07-28 PROCEDURE — 99285 EMERGENCY DEPT VISIT HI MDM: CPT | Mod: 25

## 2019-07-28 PROCEDURE — 83605 ASSAY OF LACTIC ACID: CPT | Performed by: PHYSICIAN ASSISTANT

## 2019-07-28 PROCEDURE — 96361 HYDRATE IV INFUSION ADD-ON: CPT

## 2019-07-28 RX ORDER — ATORVASTATIN CALCIUM 40 MG/1
80 TABLET, FILM COATED ORAL EVERY EVENING
Status: DISCONTINUED | OUTPATIENT
Start: 2019-07-29 | End: 2019-07-31 | Stop reason: HOSPADM

## 2019-07-28 RX ORDER — POTASSIUM CHLORIDE 1500 MG/1
20-40 TABLET, EXTENDED RELEASE ORAL
Status: DISCONTINUED | OUTPATIENT
Start: 2019-07-28 | End: 2019-07-31 | Stop reason: HOSPADM

## 2019-07-28 RX ORDER — HYDRALAZINE HYDROCHLORIDE 20 MG/ML
10 INJECTION INTRAMUSCULAR; INTRAVENOUS EVERY 4 HOURS PRN
Status: DISCONTINUED | OUTPATIENT
Start: 2019-07-28 | End: 2019-07-31 | Stop reason: HOSPADM

## 2019-07-28 RX ORDER — LOSARTAN POTASSIUM 50 MG/1
50 TABLET ORAL DAILY
Status: DISCONTINUED | OUTPATIENT
Start: 2019-07-29 | End: 2019-07-31 | Stop reason: HOSPADM

## 2019-07-28 RX ORDER — ACETAMINOPHEN 325 MG/1
975 TABLET ORAL ONCE
Status: COMPLETED | OUTPATIENT
Start: 2019-07-28 | End: 2019-07-28

## 2019-07-28 RX ORDER — POLYETHYLENE GLYCOL 3350 17 G/17G
0.33 POWDER, FOR SOLUTION ORAL DAILY
Status: ON HOLD | COMMUNITY
End: 2019-07-31

## 2019-07-28 RX ORDER — PALIPERIDONE 3 MG/1
9 TABLET, EXTENDED RELEASE ORAL EVERY MORNING
Status: DISCONTINUED | OUTPATIENT
Start: 2019-07-29 | End: 2019-07-31 | Stop reason: HOSPADM

## 2019-07-28 RX ORDER — POTASSIUM CHLORIDE 29.8 MG/ML
20 INJECTION INTRAVENOUS
Status: DISCONTINUED | OUTPATIENT
Start: 2019-07-28 | End: 2019-07-31 | Stop reason: HOSPADM

## 2019-07-28 RX ORDER — IBUPROFEN 200 MG
200 TABLET ORAL EVERY 8 HOURS PRN
Status: ON HOLD | COMMUNITY
End: 2019-12-21

## 2019-07-28 RX ORDER — BENZTROPINE MESYLATE 1 MG/1
1 TABLET ORAL 2 TIMES DAILY
Status: DISCONTINUED | OUTPATIENT
Start: 2019-07-28 | End: 2019-07-31 | Stop reason: HOSPADM

## 2019-07-28 RX ORDER — MAGNESIUM SULFATE HEPTAHYDRATE 40 MG/ML
4 INJECTION, SOLUTION INTRAVENOUS EVERY 4 HOURS PRN
Status: DISCONTINUED | OUTPATIENT
Start: 2019-07-28 | End: 2019-07-31 | Stop reason: HOSPADM

## 2019-07-28 RX ORDER — ALBUTEROL SULFATE 0.83 MG/ML
2.5 SOLUTION RESPIRATORY (INHALATION) EVERY 4 HOURS PRN
Status: DISCONTINUED | OUTPATIENT
Start: 2019-07-28 | End: 2019-07-31 | Stop reason: HOSPADM

## 2019-07-28 RX ORDER — ONDANSETRON 2 MG/ML
4 INJECTION INTRAMUSCULAR; INTRAVENOUS EVERY 6 HOURS PRN
Status: DISCONTINUED | OUTPATIENT
Start: 2019-07-28 | End: 2019-07-31 | Stop reason: HOSPADM

## 2019-07-28 RX ORDER — CALCIUM CARBONATE 500(1250)
1500 TABLET ORAL DAILY
Status: DISCONTINUED | OUTPATIENT
Start: 2019-07-29 | End: 2019-07-31 | Stop reason: HOSPADM

## 2019-07-28 RX ORDER — ASPIRIN 81 MG/1
81 TABLET ORAL DAILY
Status: DISCONTINUED | OUTPATIENT
Start: 2019-07-29 | End: 2019-07-31 | Stop reason: HOSPADM

## 2019-07-28 RX ORDER — ACETAMINOPHEN 500 MG
1000 TABLET ORAL 3 TIMES DAILY PRN
Status: DISCONTINUED | OUTPATIENT
Start: 2019-07-28 | End: 2019-07-31 | Stop reason: HOSPADM

## 2019-07-28 RX ORDER — POTASSIUM CHLORIDE 1.5 G/1.58G
20-40 POWDER, FOR SOLUTION ORAL
Status: DISCONTINUED | OUTPATIENT
Start: 2019-07-28 | End: 2019-07-31 | Stop reason: HOSPADM

## 2019-07-28 RX ORDER — ALBUTEROL SULFATE 0.83 MG/ML
3 SOLUTION RESPIRATORY (INHALATION)
Status: DISCONTINUED | OUTPATIENT
Start: 2019-07-28 | End: 2019-07-28

## 2019-07-28 RX ORDER — IPRATROPIUM BROMIDE AND ALBUTEROL SULFATE 2.5; .5 MG/3ML; MG/3ML
1 SOLUTION RESPIRATORY (INHALATION) EVERY 6 HOURS PRN
Status: DISCONTINUED | OUTPATIENT
Start: 2019-07-28 | End: 2019-07-28

## 2019-07-28 RX ORDER — NICOTINE POLACRILEX 4 MG
15-30 LOZENGE BUCCAL
Status: DISCONTINUED | OUTPATIENT
Start: 2019-07-28 | End: 2019-07-31 | Stop reason: HOSPADM

## 2019-07-28 RX ORDER — DEXTROSE MONOHYDRATE 25 G/50ML
25-50 INJECTION, SOLUTION INTRAVENOUS
Status: DISCONTINUED | OUTPATIENT
Start: 2019-07-28 | End: 2019-07-31 | Stop reason: HOSPADM

## 2019-07-28 RX ORDER — ALBUTEROL SULFATE 90 UG/1
2 AEROSOL, METERED RESPIRATORY (INHALATION) EVERY 6 HOURS PRN
Status: DISCONTINUED | OUTPATIENT
Start: 2019-07-28 | End: 2019-07-28

## 2019-07-28 RX ORDER — ONDANSETRON 2 MG/ML
4 INJECTION INTRAMUSCULAR; INTRAVENOUS ONCE
Status: COMPLETED | OUTPATIENT
Start: 2019-07-28 | End: 2019-07-28

## 2019-07-28 RX ORDER — CEFAZOLIN SODIUM 1 G/50ML
2000 SOLUTION INTRAVENOUS ONCE
Status: COMPLETED | OUTPATIENT
Start: 2019-07-28 | End: 2019-07-28

## 2019-07-28 RX ORDER — SERTRALINE HYDROCHLORIDE 100 MG/1
200 TABLET, FILM COATED ORAL DAILY
Status: DISCONTINUED | OUTPATIENT
Start: 2019-07-29 | End: 2019-07-31 | Stop reason: HOSPADM

## 2019-07-28 RX ORDER — POTASSIUM CL/LIDO/0.9 % NACL 10MEQ/0.1L
10 INTRAVENOUS SOLUTION, PIGGYBACK (ML) INTRAVENOUS
Status: DISCONTINUED | OUTPATIENT
Start: 2019-07-28 | End: 2019-07-31 | Stop reason: HOSPADM

## 2019-07-28 RX ORDER — HALOPERIDOL 10 MG/1
5 TABLET ORAL 2 TIMES DAILY PRN
COMMUNITY
End: 2019-10-11

## 2019-07-28 RX ORDER — METOPROLOL SUCCINATE 25 MG/1
25 TABLET, EXTENDED RELEASE ORAL DAILY
Status: DISCONTINUED | OUTPATIENT
Start: 2019-07-29 | End: 2019-07-31 | Stop reason: HOSPADM

## 2019-07-28 RX ORDER — IPRATROPIUM BROMIDE AND ALBUTEROL SULFATE 2.5; .5 MG/3ML; MG/3ML
1 SOLUTION RESPIRATORY (INHALATION)
Status: DISCONTINUED | OUTPATIENT
Start: 2019-07-28 | End: 2019-07-31 | Stop reason: HOSPADM

## 2019-07-28 RX ORDER — ONDANSETRON 4 MG/1
4 TABLET, ORALLY DISINTEGRATING ORAL EVERY 6 HOURS PRN
Status: DISCONTINUED | OUTPATIENT
Start: 2019-07-28 | End: 2019-07-31 | Stop reason: HOSPADM

## 2019-07-28 RX ORDER — GABAPENTIN 300 MG/1
900 CAPSULE ORAL 3 TIMES DAILY
Status: DISCONTINUED | OUTPATIENT
Start: 2019-07-28 | End: 2019-07-31 | Stop reason: HOSPADM

## 2019-07-28 RX ORDER — LIDOCAINE 40 MG/G
CREAM TOPICAL
Status: DISCONTINUED | OUTPATIENT
Start: 2019-07-28 | End: 2019-07-31 | Stop reason: HOSPADM

## 2019-07-28 RX ORDER — CLONAZEPAM 0.5 MG/1
0.5 TABLET ORAL AT BEDTIME
Status: DISCONTINUED | OUTPATIENT
Start: 2019-07-28 | End: 2019-07-31 | Stop reason: HOSPADM

## 2019-07-28 RX ORDER — POTASSIUM CHLORIDE 7.45 MG/ML
10 INJECTION INTRAVENOUS
Status: DISCONTINUED | OUTPATIENT
Start: 2019-07-28 | End: 2019-07-31 | Stop reason: HOSPADM

## 2019-07-28 RX ORDER — AMOXICILLIN 250 MG
1 CAPSULE ORAL 2 TIMES DAILY PRN
Status: DISCONTINUED | OUTPATIENT
Start: 2019-07-28 | End: 2019-07-31 | Stop reason: HOSPADM

## 2019-07-28 RX ORDER — POLYETHYLENE GLYCOL 3350 17 G/17G
17 POWDER, FOR SOLUTION ORAL EVERY OTHER DAY
Status: DISCONTINUED | OUTPATIENT
Start: 2019-07-29 | End: 2019-07-31 | Stop reason: HOSPADM

## 2019-07-28 RX ORDER — SUMATRIPTAN 25 MG/1
25-50 TABLET, FILM COATED ORAL
Status: DISCONTINUED | OUTPATIENT
Start: 2019-07-28 | End: 2019-07-31 | Stop reason: HOSPADM

## 2019-07-28 RX ORDER — NALOXONE HYDROCHLORIDE 0.4 MG/ML
.1-.4 INJECTION, SOLUTION INTRAMUSCULAR; INTRAVENOUS; SUBCUTANEOUS
Status: DISCONTINUED | OUTPATIENT
Start: 2019-07-28 | End: 2019-07-31 | Stop reason: HOSPADM

## 2019-07-28 RX ADMIN — TOPIRAMATE 75 MG: 50 TABLET ORAL at 21:46

## 2019-07-28 RX ADMIN — INSULIN ASPART 25 UNITS: 100 INJECTION, SOLUTION INTRAVENOUS; SUBCUTANEOUS at 17:45

## 2019-07-28 RX ADMIN — ONDANSETRON HYDROCHLORIDE 4 MG: 2 INJECTION, SOLUTION INTRAMUSCULAR; INTRAVENOUS at 11:52

## 2019-07-28 RX ADMIN — TAZOBACTAM SODIUM AND PIPERACILLIN SODIUM 3.38 G: 375; 3 INJECTION, SOLUTION INTRAVENOUS at 13:22

## 2019-07-28 RX ADMIN — INSULIN GLARGINE 60 UNITS: 100 INJECTION, SOLUTION SUBCUTANEOUS at 22:07

## 2019-07-28 RX ADMIN — GABAPENTIN 900 MG: 300 CAPSULE ORAL at 21:45

## 2019-07-28 RX ADMIN — SODIUM CHLORIDE 1000 ML: 9 INJECTION, SOLUTION INTRAVENOUS at 11:51

## 2019-07-28 RX ADMIN — Medication 10 MG: at 21:44

## 2019-07-28 RX ADMIN — RANITIDINE 300 MG: 150 TABLET ORAL at 21:45

## 2019-07-28 RX ADMIN — GABAPENTIN 900 MG: 300 CAPSULE ORAL at 16:15

## 2019-07-28 RX ADMIN — VANCOMYCIN HYDROCHLORIDE 2000 MG: 5 INJECTION, POWDER, LYOPHILIZED, FOR SOLUTION INTRAVENOUS at 13:54

## 2019-07-28 RX ADMIN — IPRATROPIUM BROMIDE AND ALBUTEROL SULFATE 3 ML: .5; 3 SOLUTION RESPIRATORY (INHALATION) at 19:46

## 2019-07-28 RX ADMIN — ACETAMINOPHEN 975 MG: 325 TABLET, FILM COATED ORAL at 11:52

## 2019-07-28 RX ADMIN — SODIUM CHLORIDE 1000 ML: 9 INJECTION, SOLUTION INTRAVENOUS at 14:46

## 2019-07-28 RX ADMIN — AZITHROMYCIN MONOHYDRATE 500 MG: 500 INJECTION, POWDER, LYOPHILIZED, FOR SOLUTION INTRAVENOUS at 16:13

## 2019-07-28 RX ADMIN — CLONAZEPAM 0.5 MG: 0.5 TABLET ORAL at 21:46

## 2019-07-28 RX ADMIN — BENZTROPINE MESYLATE 1 MG: 1 TABLET ORAL at 21:46

## 2019-07-28 RX ADMIN — ENOXAPARIN SODIUM 40 MG: 40 INJECTION SUBCUTANEOUS at 16:09

## 2019-07-28 RX ADMIN — TAZOBACTAM SODIUM AND PIPERACILLIN SODIUM 4.5 G: 500; 4 INJECTION, SOLUTION INTRAVENOUS at 18:19

## 2019-07-28 ASSESSMENT — ACTIVITIES OF DAILY LIVING (ADL)
RETIRED_EATING: 0-->INDEPENDENT
COGNITION: 0 - NO COGNITION ISSUES REPORTED
DRESS: 1-->ASSISTIVE EQUIPMENT
NUMBER_OF_TIMES_PATIENT_HAS_FALLEN_WITHIN_LAST_SIX_MONTHS: 1
ADLS_ACUITY_SCORE: 23
ADLS_ACUITY_SCORE: 25
FALL_HISTORY_WITHIN_LAST_SIX_MONTHS: YES
TOILETING: 1-->ASSISTIVE EQUIPMENT
AMBULATION: 2-->ASSISTIVE PERSON
BATHING: 3-->ASSISTIVE EQUIPMENT AND PERSON
WHICH_OF_THE_ABOVE_FUNCTIONAL_RISKS_HAD_A_RECENT_ONSET_OR_CHANGE?: AMBULATION;TRANSFERRING;TOILETING;BATHING;DRESSING
SWALLOWING: 0-->SWALLOWS FOODS/LIQUIDS WITHOUT DIFFICULTY
RETIRED_COMMUNICATION: 0-->UNDERSTANDS/COMMUNICATES WITHOUT DIFFICULTY
TRANSFERRING: 1-->ASSISTIVE EQUIPMENT

## 2019-07-28 ASSESSMENT — ENCOUNTER SYMPTOMS
VOMITING: 1
NAUSEA: 1
ABDOMINAL PAIN: 0
CHILLS: 1
TREMORS: 1
DIARRHEA: 1

## 2019-07-28 ASSESSMENT — MIFFLIN-ST. JEOR: SCORE: 1503.95

## 2019-07-28 NOTE — PROGRESS NOTES
"                                                            [facility]    Date:7/28/2019  Admission Dx: Sepsis secondary to pneumonia   Pulmonary History: CHF, COPD, sleep apnea- has not worn here CPAP in over 1 year   Home Nebulizer/MDI Use:  Albuterol 2p Q6 prn,Duoneb prn,   Home Oxygen: none    Acuity Level (RCAT flow sheet):3     Aerosol Therapy initiated: Duoneb QID, Albuterol prn     Pulmonary Hygiene initiated: Deep breathe and cough    Volume Expansion initiated: IS    Current Oxygen Requirements: 2.5 LPM Nc    Current SpO2: 94%    Re-evaluation date: 31 July     Patient Education: Patient does not wear her CPAP regularly, but will try a hospital CPAP tonight to see if she can tolerate the pressure. Informed of benefits of compliance with CPAP from her CINDY.      See \"RT Assessments\" flow sheet for patient assessment scoring and Acuity Level Details.           "

## 2019-07-28 NOTE — H&P
[facility]    Hospitalist History and Physical    Name: Nena Tang    MRN: [id number]  YOB: 1960    Age: 59 year old  Date of Admission:  7/28/2019  Date of Service (when I saw the patient): 07/28/19    Assessment & Plan   Nena Tang is a 59 year old female with past medical history significant for type 2 diabetes mellitus, uterine cancer, takotsubo cardiomyopathy, schizoaffective disorder, obstructive sleep apnea, depression, migraine, irritable bowel syndrome, hypertension, hypercholesterolemia, coronary artery disease and chronic low back pain presented to the emergency room with Rigor's fever and cough.  Hypoxic, chest x-ray was equivocal for pneumonia versus CHF, had elevated white cell count, lactic acidosis, she was febrile and tachycardic.  She is being admitted for sepsis secondary to pneumonia.    Sepsis secondary to pneumonia  --Presented with high-grade fever, tachycardia, leukocytosis, lactic acidosis, hypoxia  --Chest x-ray equivocal  --Clinical presentation suggestive of pneumonia with history of productive cough and hypoxia  --Blood culture sent in the emergency room  --Received vancomycin and Zosyn in the emergency room  --Patient with community-acquired pneumonia is high risk will continue on Zosyn and azithromycin  --Continues O2 sat monitoring  --Wean supplemental O2 as able      Type 2 diabetes mellitus poorly controlled last hemoglobin A1c in May was 7.6  --Resume long-acting insulin 60 units of glargine, 25 units of NovoLog with meals 3 times daily  --Sliding scale:  --Prior to admission also on Ozempic once weekly    History of cardiomyopathy and coronary artery disease  --Continue aspirin, metoprolol, losartan and statins  --EKG with tachycardia.  --Denies any chest pain   --Clinically stable    Hypertension uncontrolled  --Patient with chills and Rigors  --Continue prior to admission meds  --PRN hydralazine    History of CHF  --Chest x-ray  questionable  --BNP negative  --Patient has diastolic dysfunction  --Monitor daily weights I's and O's  --Continue prior to admission meds    Sleep apnea  --Resume CPAP at night    COPD  --Continue prior to admission nebs    Schizoaffective disorder  --Patient is a limited historian   --She lives in a group home  --Will need supportive care  --Continue paliperidone        DVT Prophylaxis: Enoxaparin (Lovenox) SQ  Code Status: Full Code    Disposition: Admitted as inpatient    Primary Care Physician   Rowena Haas    Chief Complaint   Chills at [facility] today  Cough for 2 days    History is obtained from the patient, ED staff and records    History of Present Illness   Nena Tang is a 59 year old female who presents with past medical history significant for diabetes mellitus hypertension coronary artery disease congestive heart failure, cardiomyopathy sleep apnea, schizoaffective disorder, presented to the emergency room with a staff from her group home with shortness of breath and chills as noted at [facility] today.  In the emergency room she also was febrile tachycardic and hypoxic.  Per patient she has been having cough for the last few days cough has been productive and has some shortness of breath no chest pain.  She was not aware of fever but was shaky and chills complained of malaise and weakness.  Denies any chest pain.  Denies review of all other symptoms.    In the emergency room evaluation showed possible pneumonia on chest x-ray, leukocytosis, lactic acidosis.  Patient is being admitted for pneumonia and sepsis for further evaluation and treatment    Past Medical History    Past Medical History:   Diagnosis Date     Acute respiratory failure with hypoxia (H) 9/4/2017     CAD (coronary artery disease)     5/2014 cath, nonbostructive stenosis to LAD, RCA.     Chronic low back pain 1/22/2013     Cocaine abuse, in remission (H)      Fecal urgency 3/8/2012     History of heroin abuse       Hyperlipidemia LDL goal <100 10/31/2010     Hypertension 7/29/2013     Illiterate 8/30/2011     Irritable bowel syndrome      Left cataract      Migraine 4/19/2012     Migraine headache 4/22/2013     Moderate major depression (H) 6/8/2011     Noncompliance with medication regimen 6/8/2011     Obesity      CINDY (obstructive sleep apnea) 3/8/2012    uses CPAP     Osteopenia 10/7/2009     Pneumonia of right lower lobe due to infectious organism (H) 9/4/2017     Schizoaffective disorder, depressive type (H) 2/25/2013     Sepsis (H) 8/29/2017     Suicidal intent 10/2/2013     Takotsubo cardiomyopathy      Type 2 diabetes mellitus (H) 8/30/2011     Uterine cancer (H) 1983     Verbal auditory hallucination 10/4/2012         Past Surgical History   Past Surgical History:   Procedure Laterality Date     C OOPHORECTORMY FOR RAHEL, W/BX  1983    UTERINE     CATARACT IOL, RT/LT Bilateral 2017     COLONOSCOPY N/A 3/16/2017    Procedure: COLONOSCOPY;  Surgeon: Traci Gonzalez MD;  Location:  GI     Coronary CTA  5/21/2014     HYSTERECTOMY  1983    uterine cancer yearly pap's per provider.     LAPAROSCOPIC CHOLECYSTECTOMY  2008     PHACOEMULSIFICATION CLEAR CORNEA WITH STANDARD INTRAOCULAR LENS IMPLANT Left 5/5/2017    Procedure: PHACOEMULSIFICATION CLEAR CORNEA WITH STANDARD INTRAOCULAR LENS IMPLANT;  LEFT EYE PHACOEMULSIFICATION CLEAR CORNEA WITH STANDARD INTRAOCULAR LENS IMPLANT ;  Surgeon: Tyra Diaz MD;  Location:  [facility]     PHACOEMULSIFICATION CLEAR CORNEA WITH STANDARD INTRAOCULAR LENS IMPLANT Right 6/30/2017    Procedure: PHACOEMULSIFICATION CLEAR CORNEA WITH STANDARD INTRAOCULAR LENS IMPLANT;  RIGHT EYE PHACOEMULSIFICATION CLEAR CORNEA WITH STANDARD INTRAOCULAR LENS IMPLANT;  Surgeon: Tyra Diaz MD;  Location:  [facility]     RELEASE TRIGGER FINGER  10/11/2012    Left thumb. Procedure: RELEASE TRIGGER FINGER;  LEFT THUMB TRIGGER RELEASE;  Surgeon: Tay Langley MD;  Location:  [facility]     RELEASE  TRIGGER FINGER Right 2016    Procedure: RELEASE TRIGGER FINGER;  Surgeon: Albino Castañeda MD;  Location: [facility] OR       Prior to Admission Medications   Prior to Admission Medications   Prescriptions Last Dose Informant Patient Reported? Taking?   Continuous Blood Gluc  (DEXCOM G5 MOBILE ) RO   No No   Si Device 4 times daily Test BS 4 times daily   Continuous Blood Gluc  (DEXCOM G6 ) RO   No No   Si Device 4 times daily As directed for continuous glucose monitoring   Continuous Blood Gluc Sensor (DEXCOM G5 MOB/G4 PLAT SENSOR) MISC   No No   Si Device every 7 days   Continuous Blood Gluc Sensor (DEXCOM G6 SENSOR) MISC   No No   Si Device every 10 days For continuous glucose monitoring   Continuous Blood Gluc Transmit (DEXCOM G5 MOBILE TRANSMITTER) MISC   No No   Si Device 4 times daily   Continuous Blood Gluc Transmit (DEXCOM G6 TRANSMITTER) MISC   No No   Si Device every 3 months   SUMAtriptan (IMITREX) 25 MG tablet   No No   Sig: Take 1-2 tablets (25-50 mg) by mouth at onset of headache for migraine May repeat in 2 hours. Max 8 tablets/24 hours.   acetaminophen (TYLENOL) 500 MG tablet   No No   Sig: Take 2 tablets (1,000 mg) by mouth 3 times daily as needed for mild pain   albuterol (PROAIR HFA/PROVENTIL HFA/VENTOLIN HFA) 108 (90 Base) MCG/ACT Inhaler   No No   Sig: Inhale 2 puffs into the lungs every 6 hours as needed for shortness of breath / dyspnea or wheezing   aspirin (ASA) 81 MG EC tablet   No No   Sig: TAKE 1 TABLET (81MG) BY MOUTH DAILY   atorvastatin (LIPITOR) 80 MG tablet   No No   Sig: TAKE 1 TABLET (80MG) BY MOUTH DAILY   benztropine (COGENTIN) 0.5 MG tablet   No No   Sig: Take 2 tablets (1 mg) by mouth 2 times daily   blood glucose (NO BRAND SPECIFIED) test strip   No No   Sig: Use to test blood sugar  4 times daily or as directed. To accompany: Blood Glucose Monitor Brands: per insurance.   calcium carbonate (OS-ALLEN) 1500 (600  Ca) MG tablet   No No   Sig: Take 3 tablets (1,800 mg) by mouth daily   cholecalciferol (VITAMIN D3) 30152 units (1250 mcg) capsule   No No   Sig: TAKE 1 CAPSULE (50,000 UNITS) MONTHLY   clonazePAM (KLONOPIN) 0.5 MG tablet   No No   Sig: Take 1 tablet (0.5 mg) by mouth At Bedtime   diclofenac (VOLTAREN) 1 % topical gel   No No   Sig: Place 4 g onto the skin 4 times daily as needed for moderate pain   gabapentin (NEURONTIN) 300 MG capsule   No No   Sig: Take 3 capsules (900 mg) by mouth 3 times daily   glucose 4 G CHEW chewable tablet   No No   Sig: Take 2 every 15 minutes for blood sugar <70mg/dL. Recheck blood sugar every 15 minutes until above 70mg/dL, then eat a substantial meal.   guaiFENesin (ROBITUSSIN) 100 MG/5ML liquid   No No   Sig: Take 10 mLs (200 mg) by mouth every 4 hours as needed for cough   insulin aspart (NOVOLOG FLEXPEN) 100 UNIT/ML pen   No No   Sig: Inject 25 Units Subcutaneous 3 times daily (with meals) Once daily, can add additional 5 units if BGs are >500mg/dL.   insulin glargine (LANTUS SOLOSTAR PEN) 100 UNIT/ML pen   No No   Sig: Inject 60 Units Subcutaneous At Bedtime   insulin pen needle (NOVOFINE 30) 30G X 8 MM miscellaneous   No No   Sig: USE 4 DAILY OR AS DIRECTED   ipratropium - albuterol 0.5 mg/2.5 mg/3 mL (DUONEB) 0.5-2.5 (3) MG/3ML neb solution   No No   Sig: Take 1 vial (3 mLs) by nebulization every 6 hours as needed for shortness of breath / dyspnea or wheezing   losartan (COZAAR) 50 MG tablet   No No   Sig: Take 1 tablet (50 mg) by mouth daily   melatonin 5 MG CAPS   No No   Sig: Take 2 capsules by mouth At Bedtime   metoprolol succinate ER (TOPROL-XL) 25 MG 24 hr tablet   No No   Sig: Take 1 tablet (25 mg) by mouth daily   nystatin (MYCOSTATIN) 851629 UNIT/GM external powder   No No   Sig: Apply topically once as needed for dry skin   order for DME   No No   Sig: Equipment being ordered: Freestyle [employer] [place]   order for DME   No No   Sig: Equipment being ordered: Depends.    paliperidone ER (INVEGA) 9 MG 24 hr tablet   No No   Sig: Take 1 tablet (9 mg) by mouth every morning   polyethylene glycol (MIRALAX/GLYCOLAX) powder   No No   Sig: TAKE 8.5 GRAMS (1/2 CAPFUL) BY MOUTH EVERY OTHER DAY.   ranitidine (ZANTAC) 300 MG tablet   No No   Sig: TAKE 1 TABLET (300MG) BY MOUTH AT BEDTIME   semaglutide (OZEMPIC) 1 MG/DOSE pen   No No   Sig: Inject 1 mg Subcutaneous every 7 days   senna-docusate (SENOKOT-S;PERICOLACE) 8.6-50 MG per tablet   No No   Sig: Take 1 tablet by mouth 2 times daily as needed for constipation   sertraline (ZOLOFT) 100 MG tablet   No No   Sig: Take 2 tablets (200 mg) by mouth daily   spacer ([employer] PATRICIA) holding chamber   No No   Sig: Holding/spacer device to use with inhaler.   topiramate (TOPAMAX) 50 MG tablet   No No   Sig: Take 1.5 tablets (75 mg) by mouth 2 times daily      Facility-Administered Medications: None     Allergies   Allergies   Allergen Reactions     Imidazole Antifungals Hives     Tolerates diflucan     Ketoprofen Itching     Pruritis to topical     Lisinopril Hives     Metformin Other (See Comments)     Patient hospitalized for lactic acidosis - admitting provider suspectd caused by metformin     Metronidazole Hives     Posaconazole Hives     Tolerates diflucan       Social History   Social History     Tobacco Use     Smoking status: Never Smoker     Smokeless tobacco: Never Used   Substance Use Topics     Alcohol use: No     Frequency: Never     Comment: last month     Social History     Social History Narrative     10/2014. Has 2 sons. 7 grandchildren.         Unemployed. Graduated HS.         Tobacco use: Denies    Alcohol use: Escalated use since   10/2014    Drug: Denies   Lives in a group home denies any use of alcohol or smoking    Family History   Per patient sister  of lung cancer mom also had some sort of cancer.    Review of Systems   A Comprehensive greater than 10 system review of systems was carried out.   Pertinent positives and negatives are noted above.  Otherwise negative for contributory information.    Physical Exam   Temp: 102.7  F (39.3  C) Temp src: Oral BP: 112/72 Pulse: 107 Heart Rate: 114 Resp: 24 SpO2: (!) 89 %(oxygen applied at 2LPM/NC) O2 Device: None (Room air)    Vital Signs with Ranges  Temp:  [102.7  F (39.3  C)] 102.7  F (39.3  C)  Pulse:  [107] 107  Heart Rate:  [114] 114  Resp:  [24] 24  BP: (112-143)/() 112/72  SpO2:  [89 %] 89 %  0 lbs 0 oz    GEN:  Alert, oriented x 1, uncomfortable with rigors  HEENT:  Normocephalic/atraumatic, no scleral icterus, no nasal discharge, mouth dry.  CV: Tachycardic no murmur or JVD.  S1 + S2 noted, no S3 or S4.  LUNGS: Poor inspiratory effort, right basilar crackles. symmetric chest rise on inhalation noted.  ABD:  Active bowel sounds, soft, non-tender/non-distended.  No rebound/guarding/rigidity.  EXT:  No edema.  No cyanosis.    SKIN:  Dry to touch, no exanthems noted in the visualized areas.  NEURO:  Symmetric muscle strength,  No new focal deficits appreciated.    Data   Data reviewed today:  I personally reviewed no images or EKG's today.    Recent Labs   Lab 07/28/19  1141   WBC 16.4*   HGB 11.7   HCT 37.6   MCV 98        Recent Labs   Lab 07/28/19  1141      POTASSIUM 4.1   CHLORIDE 108   CO2 24   ANIONGAP 7   *   BUN 30   CR 1.13*   GFRESTIMATED 53*   GFRESTBLACK 62   ALLEN 9.3     No results for input(s): CULT in the last 168 hours.  Recent Labs   Lab 07/28/19  1141   AST 17   ALT 23   ALKPHOS 97   BILITOTAL 0.2     No results for input(s): INR in the last 168 hours.  Recent Labs   Lab 07/28/19  1141   LACT 3.3*     No results for input(s): TSH in the last 168 hours.  Recent Labs   Lab 07/28/19  1141   TROPI <0.015     Recent Labs   Lab 07/28/19  1150   COLOR Light Yellow   APPEARANCE Clear   URINEGLC Negative   URINEBILI Negative   URINEKETONE Negative   SG 1.024   UBLD Negative   URINEPH 6.0   PROTEIN Negative   NITRITE Negative    LEUKEST Negative   RBCU 1   WBCU <1       Recent Results (from the past 24 hour(s))   XR Chest 2 Views    Narrative    CHEST TWO VIEWS  7/28/2019 12:20 PM     HISTORY:  Fever, cough.    COMPARISON: 5/15/2019 radiographs.    FINDINGS: Enlargement of the cardiac silhouette again noted. The  pulmonary vasculature is more prominent. There is some new  peribronchial cuffing. There is also a mild degree of ill-defined  reticulonodular lung disease diffusely, new. No effusions.      Impression    IMPRESSION: The constellation of findings could all be related to  congestive heart failure with interstitial and alveolar edema, but a  diffuse infectious/inflammatory infiltrate could also give this  appearance.    CECY LANGE MD

## 2019-07-28 NOTE — PHARMACY-ADMISSION MEDICATION HISTORY
Admission medication history interview status for this patient is complete. See [facility] admission navigator for allergy information, prior to admission medications and immunization status.     Medication history interview source(s):Caregiver Zheng [phone]  Medication history resources (including written lists, pill bottles, clinic record): SureScripts, Care Everywhere, MAR per Zheng  Primary pharmacy: [facility] [id number] - [place], [place] - [address]    Changes made to PTA medication list:  Added: ibuprofen, haldol  Deleted: albuterol inhaler, diclofenac gel, glucose 4 mg chews, nystatin powder, senna-docusate, and sumatriptan  Changed: polyethylene glycol 1/2 capful every other day --> 1/3 capful daily. Guaifenesin 10 mL Q 4 hours prn --> 10 mL QHS    Actions taken by pharmacist (provider contacted, etc):None     Additional medication history information: Patient is in an adult foster care home with Zheng and his wife as caregivers. Nena is non-compliant with her CPAP (DUONEB) but should be taking it, so it was left on the list. She also takes candy instead of the glucose chews if she needs to raise her blood glucose levels. Patient sometimes hallucinates and becomes agitated, per Zheng.    Medication reconciliation/reorder completed by provider prior to medication history? Yes    Do you take OTC medications (eg tylenol, ibuprofen, fish oil, eye/ear drops, etc)? Yes, see list below.    For patients on insulin therapy: Yes  Lantus/levemir/NPH/Mix 70/30 dose:  Yes (see Med list for doses)   Sliding scale Novolog No  Patients eat three meals a day:   Yes  How many episodes of hypoglycemia do you have per week:  Varies, sometimes 3 times per month, sometimes 0 times per month.  How many missed doses do you have per week: 1 at most  How many times do you check your blood glucose per day: 4 times daily  Do you have a Continuous glucose monitor (CGM)   Not yet, one has been ordered but Nena has not  used it yet.   Any Barriers to therapy - Be specific :  cost of medications, comfortable with giving injections (if applicable), comfortable and confident with current diabetes regimen: No      Prior to Admission medications    Medication Sig Last Dose Taking? Auth Provider   acetaminophen (TYLENOL) 500 MG tablet Take 2 tablets (1,000 mg) by mouth 3 times daily as needed for mild pain 7/27/2019 at 1700 Yes Rowena Haas APRN CNP   aspirin (ASA) 81 MG EC tablet TAKE 1 TABLET (81MG) BY MOUTH DAILY 7/28/2019 at am Yes Rowena Haas APRN CNP   atorvastatin (LIPITOR) 80 MG tablet TAKE 1 TABLET (80MG) BY MOUTH DAILY 7/28/2019 at am Yes Rowena Haas APRN CNP   benztropine (COGENTIN) 0.5 MG tablet Take 2 tablets (1 mg) by mouth 2 times daily 7/28/2019 at am Yes Rowena Haas APRN CNP   blood glucose (NO BRAND SPECIFIED) test strip Use to test blood sugar  4 times daily or as directed. To accompany: Blood Glucose Monitor Brands: per insurance. 7/27/2019 at Unknown time Yes Rebecca Carr DO   calcium carbonate (OS-ALLEN) 1500 (600 Ca) MG tablet Take 3 tablets (1,800 mg) by mouth daily 7/28/2019 at am Yes Rowena Haas APRN CNP   clonazePAM (KLONOPIN) 0.5 MG tablet Take 1 tablet (0.5 mg) by mouth At Bedtime 7/27/2019 at HS Yes Reginald Brothers MD   gabapentin (NEURONTIN) 300 MG capsule Take 3 capsules (900 mg) by mouth 3 times daily 7/28/2019 at am Yes Rowena Haas APRN CNP   guaiFENesin (ROBITUSSIN) 100 MG/5ML SYRP Take 10 mLs by mouth nightly as needed for cough 7/27/2019 at HS Yes Unknown, Entered By History   haloperidol (HALDOL) 10 MG tablet Take 5 mg by mouth 2 times daily as needed for agitation Past Month at Unknown time Yes Unknown, Entered By History   ibuprofen (ADVIL/MOTRIN) 200 MG tablet Take 200 mg by mouth every 8 hours as needed for mild pain 7/27/2019 at HS Yes Unknown, Entered By History   insulin aspart (NOVOLOG FLEXPEN) 100 UNIT/ML pen Inject 25 Units Subcutaneous 3  times daily (with meals) Once daily, can add additional 5 units if BGs are >500mg/dL. 7/28/2019 at am Yes Rowena Haas APRN CNP   insulin glargine (LANTUS SOLOSTAR PEN) 100 UNIT/ML pen Inject 60 Units Subcutaneous At Bedtime 7/27/2019 at HS Yes Rowena Haas APRN CNP   insulin pen needle (NOVOFINE 30) 30G X 8 MM miscellaneous USE 4 DAILY OR AS DIRECTED 7/28/2019 at Unknown time Yes Rowena Haas APRN CNP   ipratropium - albuterol 0.5 mg/2.5 mg/3 mL (DUONEB) 0.5-2.5 (3) MG/3ML neb solution Take 1 vial (3 mLs) by nebulization every 6 hours as needed for shortness of breath / dyspnea or wheezing Past Month at Unknown time Yes Adriana Georges APRN CNP   losartan (COZAAR) 50 MG tablet Take 1 tablet (50 mg) by mouth daily 7/28/2019 at am Yes Rebecca Carr DO   melatonin 5 MG CAPS Take 2 capsules by mouth At Bedtime 7/27/2019 at HS Yes Rebecca Carr DO   metoprolol succinate ER (TOPROL-XL) 25 MG 24 hr tablet Take 1 tablet (25 mg) by mouth daily 7/28/2019 at am Yes Rowena Haas APRN CNP   nystatin (MYCOSTATIN) 624996 UNIT/GM external powder Apply topically once as needed for dry skin Past Week at Unknown time Yes Rowena Haas APRN CNP   order for DME Equipment being ordered: Depends. 7/27/2019 at Unknown time Yes Rowena Haas APRN CNP   order for DME Equipment being ordered: Freestyle [employer] [place] 7/27/2019 at Unknown time Yes Rebecca Carr DO   paliperidone ER (INVEGA) 9 MG 24 hr tablet Take 1 tablet (9 mg) by mouth every morning 7/28/2019 at am Yes Rowena Haas APRN CNP   polyethylene glycol (MIRALAX/GLYCOLAX) powder Take 0.33 capfuls by mouth daily 7/27/2019 at am Yes Unknown, Entered By History   ranitidine (ZANTAC) 300 MG tablet TAKE 1 TABLET (300MG) BY MOUTH AT BEDTIME 7/27/2019 at HS Yes Rowena Haas APRN CNP   sertraline (ZOLOFT) 100 MG tablet Take 2 tablets (200 mg) by mouth daily 7/28/2019 at am Yes Rowena Haas APRN CNP   topiramate (TOPAMAX)  50 MG tablet Take 1.5 tablets (75 mg) by mouth 2 times daily 7/28/2019 at am Yes Debbie Germain PA-C   cholecalciferol (VITAMIN D3) 35835 units (1250 mcg) capsule TAKE 1 CAPSULE (50,000 UNITS) MONTHLY 7/19/2019  Rebecca Carr,    semaglutide (OZEMPIC) 1 MG/DOSE pen Inject 1 mg Subcutaneous every 7 days 7/22/2019  Rowena Haas, APRN CNP

## 2019-07-28 NOTE — ED NOTES
Pt actively vomiting on ED arrival with shaking.  She states needing to emergently have BM and  assisted to commode to have stool.  She reports that this is second loose stool this AM.

## 2019-07-28 NOTE — ED TRIAGE NOTES
Pt was at [facility] this AM and developed chills and shaking.   She arrives with tremors in all extremities and is accompanied by group home caregiver who reports she went to event yesterday and walked further distance than usual.

## 2019-07-28 NOTE — ED PROVIDER NOTES
History     Chief Complaint:  Nausea, Vomiting, & Diarrhea and Shaking      HPI   Nena Tang is a 59 year old female with history of diabetes, acute respiratory failure, CAD, Takotsubo cardiomyopathy, Schizoaffective disorder, cocaine and heroin abuse who presents to the emergency department today for evaluation of vomiting, nausea, diarrhea, and shaking. The patient's caregiver reports the patient was at an outside event yesterday where she was outside more than usual. One hour prior to arrival she was at [employer] and developed tremors in all of her extremities, episodes of vomiting, and diarrhea. She says she feels very cold. Due to these symptoms she presented to the emergency department today. Additionally, she has no known sick contacts. She says she has had some trouble breathing, and has a cough at baseline. She denies abdominal pain, and chest pain.      Allergies:  Imidazole Antifungals  Ketoprofen  Lisinopril  Metformin  Metronidazole  Posaconazole        Medications:    Aspirin 81 mg   Albuterol  Lipitor  Cogentin  Calcium Carbonate  Klonopin  Voltaren  Gabapentin  Losartan  Meoptrolol Succinate ER  Nystatin  Ingefa  Ozempic  Topamax      Past Medical History:    Acute respiratory failure with hypoxia  CAD  Chronic low back pain  Cocaine abuse, remission  Fecal urgency  Heroin abuse  Hyperlipidemia  Hypertension  IBS  Left cataract  Migraine  Depression  Obeisty  CINDY  Osteopenia  Pneumonia  Schizoaffective disorder  Sepsis  Suicidal intent  Takotsubo cardiomyopathy  Diabetes Type 2  Uterine cancer  Veral auditory hallucination      Past Surgical History:    Uterine oophorectomy  Cataract  Hysterectomy  Laparoscopic cholecystectomy  Phacoemulsification clear cornea with standard intraocular implant x2  Release trigger finger x2      Family History:    Bladder cancer  COPD  Cirrhosis  Alcohol/drug  Diabetes  Hypertension  Lipids  CAD  Glaucoma  Mental illness  Psychotic disorder  Colon  Cancer  Colorectal cancer      Social History:  The patient was accompanied to the ED by caregiver.  Smoking Status: Never Smoker  Smokeless Tobacco: Never used  Alcohol Use: No   Marital Status:   [5]       Review of Systems   Constitutional: Positive for chills.   Cardiovascular: Negative for chest pain.   Gastrointestinal: Positive for diarrhea, nausea and vomiting. Negative for abdominal pain.   Neurological: Positive for tremors (all extremities).   All other systems reviewed and are negative.      Physical Exam     Patient Vitals for the past 24 hrs:   BP Temp Temp src Pulse Heart Rate Resp SpO2   07/28/19 1300 112/72 -- -- 107 -- -- --   07/28/19 1230 135/66 -- -- -- -- -- --   07/28/19 1135 (!) 143/111 102.7  F (39.3  C) Oral -- 114 24 (!) 89 %        Physical Exam  Constitutional: rigors, but non-toxic appearing.   Head: No external signs of trauma noted to head or face.   Eyes: Pupils are equal, round, and reactive to light. Conjunctiva normal.  ENT: MMM. Normal voice.   Neck: normal ROM.   Cardiovascular: tachycardic, regular rhythm, and intact distal pulses.    Respiratory: Effort normal. No respiratory distress. Lungs clear to auscultation bilaterally.   GI: Soft. Non-tender. No rebound or guarding.   Musculoskeletal: No deformities appreciated. Normal ROM. No edema noted.  Neurological: Alert and Oriented x 3. Speech normal. Moves all extremities equally.   Psychiatric: Appropriate mood, affect, and behavior.   Skin: Skin is warm and dry. no rash or skin lesions noted.      Emergency Department Course     ECG:  Indication: fever  Completed at 1210.  Read at 1210.   Sinus tachycardia. Left axis deviation. Abnormal ECG.  Rate 113 bpm. CT interval 132. QRS duration 74. QT/QTc 300/411. P-R-T axes 32 -56 30.     Imaging:  Radiology findings were communicated with the patient who voiced understanding of the findings.    Chest X-Ray. 2 Views:   IMPRESSION: The constellation of findings could all be  related to  congestive heart failure with interstitial and alveolar edema, but a  diffuse infectious/inflammatory infiltrate could also give this  appearance  reading per radiology.      Laboratory:  Laboratory findings were communicated with the patient who voiced understanding of the findings.    CBC: WBC 16.4 (H), HGB 11.7,    CMP: Glucose 112 (H), Creatinine 1.13 (H), GFR Estimate 53 (L) o/w WNL.    1) Lactic Acid: 3.3 (H)  2) Lactic Acid: 2.2 (H)    Troponin (Collected 1141): <0.015   Glucose by meter: 113 (H)  BNP: 43     UA: Light Yellow and Clear. Mucous urine Present o/w WNL      Blood cultures: Pending       Interventions:  1151 NS Bolus 1,000mL IV   1152 Zofran 4mg IV   1152 Tylenol 975 mg PO  1322 Zosyn 3.375 g IV       Emergency Department Course:  Nursing notes and vitals reviewed.  1125: I performed an exam of the patient as documented above.   IV was inserted and blood was drawn for laboratory testing, results above.    The patient provided a urine sample here in the emergency department. This was sent for laboratory testing, findings above.    The patient was sent for a Chest XR while in the emergency department, results above.      EKG obtained in the ED, see results above.      1315 Patient rechecked and updated.      1320 I spoke with Dr. Baldwin of the Hospitalist service regarding patient's presentation, findings, and plan of care.     1324 Patient rechecked and updated.      I discussed the treatment plan with the patient. They expressed understanding of this plan and consented to admission. I discussed the patient with Dr. Baldwin, who will admit the patient to a monitored bed for further evaluation and treatment.   I personally reviewed the laboratory and imaging results with the Patient and answered all related questions prior to  admission.     Impression & Plan      CMS Diagnoses: The patient has signs of Severe Sepsis as evidenced by:    1. 2 SIRS criteria, AND  2. Suspected  infection, AND   3. Organ dysfunction: Lactic Acid > 1.9    Time severe sepsis diagnosis confirmed = 1141 as this was the time when Lactate resulted, and the level was > 1.9 and       3 Hour Severe Sepsis Bundle Completion:  1. Initial Lactic Acid Result:   Recent Labs   Lab Test 19  1141 19  0658 05/15/19  2017   LACT 3.3* 1.4 2.2*     2. Blood Cultures before Antibiotics: Yes  3. Broad Spectrum Antibiotics Administered: Yes     Anti-infectives (From admission through now)    Start     Dose/Rate Route Frequency Ordered Stop    19 1255  vancomycin (VANCOCIN) 2,000 mg in sodium chloride 0.9 % 500 mL intermittent infusion      2,000 mg  over 2 Hours Intravenous ONCE 19 1254      19 1241  piperacillin-tazobactam (ZOSYN) infusion 3.375 g      3.375 g  100 mL/hr over 30 Minutes Intravenous ONCE 19 1240 19 1354        4. Full 30mL/kg bolus not administered due to CHF and acute Pulmonary Edema  Ideal body weight: 45.5 kg (100 lb 4.9 oz)  Adjusted ideal body weight: 70.2 kg (154 lb 13.2 oz)    Severe Sepsis reassessment:  1. Repeat Lactic Acid Level: 2.2  2. MAP>65 after initial IVF bolus, will continue to monitor fluid status and vital signs  I attest to having performed a repeat sepsis exam and assessment of perfusion at 1330 and the results demonstrate improved perfusion.       Medical Decision Makin year old female presenting with fever, vomiting, and diarrhea. A broad differential diagnosis was considered, including but not limited to pneumonia, UTI, gastroenteritis, meningitis, cellulitis, appendicitis, among others. Febrile on arrival with tachycardia. Also mildly hypoxic on room air, improved with O2 via nasal cannula. Initial lactate elevated at 3.3, but improving to 2.2 with IVF. She was only given one liter of fluid initially due to concern for possible CHF/pulmonry edema. Started a second liter just prior to admission. She has a leukocytosis of 16.4 and mild  elevation of her creatinine. Her labs are otherwise unremarkable. Urinalysis is not suggestive of infection. She has no significant abdominal tenderness on exam to suggest intra-abdominal infection as the cause of her fever. She has no headache or other symptoms to suggest meningitis at this time. Her CXR is concerning for edema vs infiltrate. Given her fever, cough, leukocytosis, and hypoxia, I suspect this is more likely pneumonia. Broad spectrum antibiotics were started after blood cultures were drawn. There is no other source of her fever identified at this time. Her tachycardia is improving after IVF, as is her lactate, and her blood pressures are stable. She will be admitted to the medicine floor for continued management.       Diagnosis:    ICD-10-CM    1. Sepsis (H) A41.9    2. Pneumonia J18.9        Disposition:  Admitted under the supervision of Dr. Baldwin.       Scribe Disclosure:  Analia HUSSEIN, am serving as a scribe at 1:14 PM on 7/28/2019 to document services personally performed by Traci España PA-C based on my observations and the provider's statements to me.    Analia Carlton  7/28/2019   [facility] EMERGENCY DEPARTMENT       Traci España PA-C  07/28/19 [id number]

## 2019-07-28 NOTE — PLAN OF CARE
Patient arrived from ICU at 1500. Lethargic Temp 101.7  VSS Vanco infusing as well as NS bolus from ER. BG 69 Juice given with improvement to 93 and then 113 prior to elliott meal. Transferred with assist of 2 walker and T-belt. Legs gave out and needed support of 2 to catch her balance and then lift. At BSC patient had an impressive extra large liquid stool greater than 2000cc. Patient reports liquid stools x 2 days at home. Order obtain to ceck for c-dif.Patient ate 3/4 hamberger and french fries with 100% ginger ail and 100% of desert. Skin intact. Lungs decreased bilt. Bathed on admission due to strong urine odor.

## 2019-07-28 NOTE — ED NOTES
Pt gave verbal consent to speak with group home caregiver to notify of admission.  Voice mail left for Zheng at [phone] to notify of admission to hospital.

## 2019-07-28 NOTE — ED NOTES
[facility]  ED Nurse Handoff Report    Nena Tang is a 59 year old female   ED Chief complaint: Nausea, Vomiting, & Diarrhea and Shaking  . ED Diagnosis:   Final diagnoses:   Sepsis (H)   Pneumonia     Allergies:   Allergies   Allergen Reactions     Imidazole Antifungals Hives     Tolerates diflucan     Ketoprofen Itching     Pruritis to topical     Lisinopril Hives     Metformin Other (See Comments)     Patient hospitalized for lactic acidosis - admitting provider suspectd caused by metformin     Metronidazole Hives     Posaconazole Hives     Tolerates diflucan       Code Status: Full Code  Activity level - Baseline/Home:  Assist X 2. Activity Level - Current:   Assist X 2. Lift room needed: No. Bariatric: No   Needed: No   Isolation: No. Infection: Not Applicable.     Vital Signs:   Vitals:    07/28/19 1135 07/28/19 1230 07/28/19 1300 07/28/19 1326   BP: (!) 143/111 135/66 112/72    Pulse:   107    Resp: 24      Temp: 102.7  F (39.3  C)   102.6  F (39.2  C)   TempSrc: Oral   Oral   SpO2: (!) 89%          Cardiac Rhythm:  ,      Pain level: 0-10 Pain Scale: 7  Patient confused: Yes. Patient Falls Risk: Yes.   Elimination Status: voided   Patient Report - Initial Complaint: fever Focused Assessment: Nena Tang is a 59 year old female with history of diabetes, acute respiratory failure, CAD, Takotsubo cardiomyopathy, Schizoaffective disorder, cocaine and heroin abuse who presents to the emergency department today for evaluation of vomiting, nausea, diarrhea, and shaking. The patient's caregiver reports the patient was at an outside event yesterday where she was outside more than usual. One hour prior to arrival she was at [employer] and developed tremors in all of her extremities, episodes of vomiting, and diarrhea. She says she feels very cold. Due to these symptoms she presented to the emergency department today. Additionally, she has no known sick contacts. She says she has had  some trouble breathing, and has a cough at baseline. She denies abdominal pain, and chest pain.  Tests Performed: labs, xray   Abnormal Results:   XR Chest 2 Views   Final Result   IMPRESSION: The constellation of findings could all be related to   congestive heart failure with interstitial and alveolar edema, but a   diffuse infectious/inflammatory infiltrate could also give this   appearance.      CECY LANGE MD        Labs Ordered and Resulted from Time of ED Arrival Up to the Time of Departure from the ED   GLUCOSE BY METER - Abnormal; Notable for the following components:       Result Value    Glucose 113 (*)     All other components within normal limits   CBC WITH PLATELETS DIFFERENTIAL - Abnormal; Notable for the following components:    WBC 16.4 (*)     MCHC 31.1 (*)     Absolute Neutrophil 13.4 (*)     All other components within normal limits   LACTIC ACID WHOLE BLOOD - Abnormal; Notable for the following components:    Lactic Acid 3.3 (*)     All other components within normal limits   COMPREHENSIVE METABOLIC PANEL - Abnormal; Notable for the following components:    Glucose 112 (*)     Creatinine 1.13 (*)     GFR Estimate 53 (*)     All other components within normal limits   ROUTINE UA WITH MICROSCOPIC - Abnormal; Notable for the following components:    Mucous Urine Present (*)     All other components within normal limits   TROPONIN I   NT PROBNP INPATIENT   LACTIC ACID WHOLE BLOOD   BLOOD CULTURE   BLOOD CULTURE     Treatments provided:fluids, antibiotics, meds  Family Comments: no family at bedside   BAILEE - [phone] (friend at home)  OBS brochure/video discussed/provided to patient:  No  ED Medications:   Medications   piperacillin-tazobactam (ZOSYN) infusion 3.375 g (3.375 g Intravenous New Bag 7/28/19 1322)   vancomycin (VANCOCIN) 2,000 mg in sodium chloride 0.9 % 500 mL intermittent infusion (has no administration in time range)   0.9% sodium chloride BOLUS (1,000 mLs Intravenous New Bag  7/28/19 1151)   ondansetron (ZOFRAN) injection 4 mg (4 mg Intravenous Given 7/28/19 1152)   acetaminophen (TYLENOL) tablet 975 mg (975 mg Oral Given 7/28/19 1152)     Drips infusing:  Yes  For the majority of the shift, the patient's behavior Green. Interventions performed were na.     Severe Sepsis OR Septic Shock Diagnosis Present: No      ED Nurse Name/Phone Number: Emily Jackson,   1:26 PM    RECEIVING UNIT ED HANDOFF REVIEW    Above ED Nurse Handoff Report was reviewed: Yes  Reviewed by: Kami Infante on July 28, 2019 at 1:45 PM

## 2019-07-29 LAB
ANION GAP SERPL CALCULATED.3IONS-SCNC: 5 MMOL/L (ref 3–14)
BASOPHILS # BLD AUTO: 0.1 10E9/L (ref 0–0.2)
BASOPHILS NFR BLD AUTO: 0.3 %
BUN SERPL-MCNC: 24 MG/DL (ref 7–30)
CALCIUM SERPL-MCNC: 8.3 MG/DL (ref 8.5–10.1)
CHLORIDE SERPL-SCNC: 111 MMOL/L (ref 94–109)
CO2 SERPL-SCNC: 21 MMOL/L (ref 20–32)
CREAT SERPL-MCNC: 1.12 MG/DL (ref 0.52–1.04)
DIFFERENTIAL METHOD BLD: ABNORMAL
EOSINOPHIL # BLD AUTO: 0.2 10E9/L (ref 0–0.7)
EOSINOPHIL NFR BLD AUTO: 0.8 %
ERYTHROCYTE [DISTWIDTH] IN BLOOD BY AUTOMATED COUNT: 12.9 % (ref 10–15)
ERYTHROCYTE [DISTWIDTH] IN BLOOD BY AUTOMATED COUNT: 12.9 % (ref 10–15)
GFR SERPL CREATININE-BSD FRML MDRD: 54 ML/MIN/{1.73_M2}
GLUCOSE BLDC GLUCOMTR-MCNC: 150 MG/DL (ref 70–99)
GLUCOSE BLDC GLUCOMTR-MCNC: 157 MG/DL (ref 70–99)
GLUCOSE BLDC GLUCOMTR-MCNC: 188 MG/DL (ref 70–99)
GLUCOSE BLDC GLUCOMTR-MCNC: 223 MG/DL (ref 70–99)
GLUCOSE BLDC GLUCOMTR-MCNC: 276 MG/DL (ref 70–99)
GLUCOSE BLDC GLUCOMTR-MCNC: 87 MG/DL (ref 70–99)
GLUCOSE BLDC GLUCOMTR-MCNC: 91 MG/DL (ref 70–99)
GLUCOSE SERPL-MCNC: 134 MG/DL (ref 70–99)
HCT VFR BLD AUTO: 29.6 % (ref 35–47)
HCT VFR BLD AUTO: 32.9 % (ref 35–47)
HGB BLD-MCNC: 10.2 G/DL (ref 11.7–15.7)
HGB BLD-MCNC: 9.3 G/DL (ref 11.7–15.7)
IMM GRANULOCYTES # BLD: 0.1 10E9/L (ref 0–0.4)
IMM GRANULOCYTES NFR BLD: 0.5 %
INTERPRETATION ECG - MUSE: NORMAL
LYMPHOCYTES # BLD AUTO: 2.9 10E9/L (ref 0.8–5.3)
LYMPHOCYTES NFR BLD AUTO: 14 %
MCH RBC QN AUTO: 30.4 PG (ref 26.5–33)
MCH RBC QN AUTO: 30.7 PG (ref 26.5–33)
MCHC RBC AUTO-ENTMCNC: 31 G/DL (ref 31.5–36.5)
MCHC RBC AUTO-ENTMCNC: 31.4 G/DL (ref 31.5–36.5)
MCV RBC AUTO: 97 FL (ref 78–100)
MCV RBC AUTO: 99 FL (ref 78–100)
MONOCYTES # BLD AUTO: 1.3 10E9/L (ref 0–1.3)
MONOCYTES NFR BLD AUTO: 6.2 %
NEUTROPHILS # BLD AUTO: 16.1 10E9/L (ref 1.6–8.3)
NEUTROPHILS NFR BLD AUTO: 78.2 %
NRBC # BLD AUTO: 0 10*3/UL
NRBC BLD AUTO-RTO: 0 /100
PLATELET # BLD AUTO: 174 10E9/L (ref 150–450)
PLATELET # BLD AUTO: 182 10E9/L (ref 150–450)
POTASSIUM SERPL-SCNC: 4.5 MMOL/L (ref 3.4–5.3)
RBC # BLD AUTO: 3.06 10E12/L (ref 3.8–5.2)
RBC # BLD AUTO: 3.32 10E12/L (ref 3.8–5.2)
SODIUM SERPL-SCNC: 137 MMOL/L (ref 133–144)
WBC # BLD AUTO: 20.6 10E9/L (ref 4–11)
WBC # BLD AUTO: 24.2 10E9/L (ref 4–11)

## 2019-07-29 PROCEDURE — 36415 COLL VENOUS BLD VENIPUNCTURE: CPT | Performed by: INTERNAL MEDICINE

## 2019-07-29 PROCEDURE — 25000125 ZZHC RX 250: Performed by: INTERNAL MEDICINE

## 2019-07-29 PROCEDURE — 99233 SBSQ HOSP IP/OBS HIGH 50: CPT | Performed by: INTERNAL MEDICINE

## 2019-07-29 PROCEDURE — 85027 COMPLETE CBC AUTOMATED: CPT | Performed by: INTERNAL MEDICINE

## 2019-07-29 PROCEDURE — 12000000 ZZH R&B MED SURG/OB

## 2019-07-29 PROCEDURE — 80048 BASIC METABOLIC PNL TOTAL CA: CPT | Performed by: INTERNAL MEDICINE

## 2019-07-29 PROCEDURE — 25000131 ZZH RX MED GY IP 250 OP 636 PS 637: Mod: GY | Performed by: INTERNAL MEDICINE

## 2019-07-29 PROCEDURE — 40000275 ZZH STATISTIC RCP TIME EA 10 MIN

## 2019-07-29 PROCEDURE — 85025 COMPLETE CBC W/AUTO DIFF WBC: CPT | Performed by: INTERNAL MEDICINE

## 2019-07-29 PROCEDURE — 94640 AIRWAY INHALATION TREATMENT: CPT

## 2019-07-29 PROCEDURE — 00000146 ZZHCL STATISTIC GLUCOSE BY METER IP

## 2019-07-29 PROCEDURE — 94640 AIRWAY INHALATION TREATMENT: CPT | Mod: 76

## 2019-07-29 PROCEDURE — 25000132 ZZH RX MED GY IP 250 OP 250 PS 637: Mod: GY | Performed by: INTERNAL MEDICINE

## 2019-07-29 PROCEDURE — 25000128 H RX IP 250 OP 636: Performed by: INTERNAL MEDICINE

## 2019-07-29 RX ORDER — AZITHROMYCIN 250 MG/1
250 TABLET, FILM COATED ORAL EVERY 24 HOURS
Status: DISCONTINUED | OUTPATIENT
Start: 2019-07-29 | End: 2019-07-31 | Stop reason: HOSPADM

## 2019-07-29 RX ADMIN — GABAPENTIN 900 MG: 300 CAPSULE ORAL at 16:43

## 2019-07-29 RX ADMIN — IPRATROPIUM BROMIDE AND ALBUTEROL SULFATE 3 ML: .5; 3 SOLUTION RESPIRATORY (INHALATION) at 19:26

## 2019-07-29 RX ADMIN — ASPIRIN 81 MG: 81 TABLET, COATED ORAL at 08:51

## 2019-07-29 RX ADMIN — TOPIRAMATE 75 MG: 50 TABLET ORAL at 21:49

## 2019-07-29 RX ADMIN — SERTRALINE HYDROCHLORIDE 200 MG: 100 TABLET ORAL at 08:50

## 2019-07-29 RX ADMIN — GABAPENTIN 900 MG: 300 CAPSULE ORAL at 21:48

## 2019-07-29 RX ADMIN — IPRATROPIUM BROMIDE AND ALBUTEROL SULFATE 3 ML: .5; 3 SOLUTION RESPIRATORY (INHALATION) at 11:44

## 2019-07-29 RX ADMIN — INSULIN ASPART 25 UNITS: 100 INJECTION, SOLUTION INTRAVENOUS; SUBCUTANEOUS at 12:12

## 2019-07-29 RX ADMIN — GABAPENTIN 900 MG: 300 CAPSULE ORAL at 08:50

## 2019-07-29 RX ADMIN — INSULIN ASPART 25 UNITS: 100 INJECTION, SOLUTION INTRAVENOUS; SUBCUTANEOUS at 19:02

## 2019-07-29 RX ADMIN — TAZOBACTAM SODIUM AND PIPERACILLIN SODIUM 4.5 G: 500; 4 INJECTION, SOLUTION INTRAVENOUS at 05:30

## 2019-07-29 RX ADMIN — RANITIDINE 300 MG: 150 TABLET ORAL at 21:48

## 2019-07-29 RX ADMIN — ENOXAPARIN SODIUM 40 MG: 40 INJECTION SUBCUTANEOUS at 16:43

## 2019-07-29 RX ADMIN — CALCIUM 1500 MG: 500 TABLET ORAL at 08:50

## 2019-07-29 RX ADMIN — TAZOBACTAM SODIUM AND PIPERACILLIN SODIUM 4.5 G: 500; 4 INJECTION, SOLUTION INTRAVENOUS at 00:40

## 2019-07-29 RX ADMIN — TAZOBACTAM SODIUM AND PIPERACILLIN SODIUM 4.5 G: 500; 4 INJECTION, SOLUTION INTRAVENOUS at 19:09

## 2019-07-29 RX ADMIN — IPRATROPIUM BROMIDE AND ALBUTEROL SULFATE 3 ML: .5; 3 SOLUTION RESPIRATORY (INHALATION) at 08:13

## 2019-07-29 RX ADMIN — METOPROLOL SUCCINATE 25 MG: 25 TABLET, EXTENDED RELEASE ORAL at 08:51

## 2019-07-29 RX ADMIN — ATORVASTATIN CALCIUM 80 MG: 40 TABLET, FILM COATED ORAL at 19:09

## 2019-07-29 RX ADMIN — LOSARTAN POTASSIUM 50 MG: 50 TABLET, FILM COATED ORAL at 08:51

## 2019-07-29 RX ADMIN — AZITHROMYCIN 250 MG: 250 TABLET, FILM COATED ORAL at 16:44

## 2019-07-29 RX ADMIN — Medication 10 MG: at 21:49

## 2019-07-29 RX ADMIN — PALIPERIDONE 9 MG: 3 TABLET, FILM COATED, EXTENDED RELEASE ORAL at 08:50

## 2019-07-29 RX ADMIN — BENZTROPINE MESYLATE 1 MG: 1 TABLET ORAL at 08:51

## 2019-07-29 RX ADMIN — INSULIN ASPART 25 UNITS: 100 INJECTION, SOLUTION INTRAVENOUS; SUBCUTANEOUS at 08:56

## 2019-07-29 RX ADMIN — TOPIRAMATE 75 MG: 50 TABLET ORAL at 08:51

## 2019-07-29 RX ADMIN — INSULIN GLARGINE 60 UNITS: 100 INJECTION, SOLUTION SUBCUTANEOUS at 21:49

## 2019-07-29 RX ADMIN — BENZTROPINE MESYLATE 1 MG: 1 TABLET ORAL at 21:49

## 2019-07-29 RX ADMIN — TAZOBACTAM SODIUM AND PIPERACILLIN SODIUM 4.5 G: 500; 4 INJECTION, SOLUTION INTRAVENOUS at 23:47

## 2019-07-29 RX ADMIN — CLONAZEPAM 0.5 MG: 0.5 TABLET ORAL at 21:49

## 2019-07-29 RX ADMIN — IPRATROPIUM BROMIDE AND ALBUTEROL SULFATE 3 ML: .5; 3 SOLUTION RESPIRATORY (INHALATION) at 15:27

## 2019-07-29 RX ADMIN — TAZOBACTAM SODIUM AND PIPERACILLIN SODIUM 4.5 G: 500; 4 INJECTION, SOLUTION INTRAVENOUS at 11:31

## 2019-07-29 ASSESSMENT — MIFFLIN-ST. JEOR: SCORE: 1481.27

## 2019-07-29 ASSESSMENT — ACTIVITIES OF DAILY LIVING (ADL)
ADLS_ACUITY_SCORE: 25

## 2019-07-29 NOTE — PLAN OF CARE
Assisted pt with calling contact Zheng, who stated her cell phone was in her bedroom at the group home. Pt satisfied with his answer.

## 2019-07-29 NOTE — PROVIDER NOTIFICATION
Web based page to hospitalist:   pt originally came in with droplet iso for pneumonia, cough is nonproductive. Droplet was discontinued for enteric but c diff negative. Do you want droplet again?

## 2019-07-29 NOTE — PROGRESS NOTES
[facility]    Hospitalist Progress Note  Name: Nena Tang    MRN: [id number]  Provider: Norma Baldwin MD  Date of Service: 07/29/2019    Assessment & Plan   Summary of Stay: Nena Tang is a 59 year old female who was admitted on 7/28/2019 for sepsis secondary to pneumonia.  Her past medical history significant for type 2 diabetes mellitus, uterine cancer, takotsubo cardiomyopathy, schizoaffective disorder, obstructive sleep apnea, depression, migraine, irritable bowel syndrome, hypertension, hypercholesterolemia, coronary artery disease and chronic low back pain presented to the emergency room with Rigor's fever and cough.  Hypoxic, chest x-ray was equivocal for pneumonia versus CHF, had elevated white cell count, lactic acidosis, she was febrile and tachycardic.      Sepsis secondary to pneumonia  --Presented with high-grade fever, tachycardia, leukocytosis, lactic acidosis, hypoxia  --Chest x-ray pneumonia versus infiltrate  --Clinical presentation suggestive of pneumonia with history of productive cough and hypoxia  --Blood culture sent in the emergency room  --Received vancomycin and Zosyn in the emergency room  --Patient with community-acquired pneumonia is high risk will continue on Zosyn and azithromycin  --Continues O2 sat monitoring  --Wean supplemental O2 as able    Drop in hemoglobin with no evidence of acute bleeding  --We will trend hemoglobin  --Monitor vitals        Type 2 diabetes mellitus poorly controlled last hemoglobin A1c in May was 7.6  --Resume long-acting insulin 60 units of glargine, 25 units of NovoLog with meals 3 times daily  --Sliding scale:  --Prior to admission also on Ozempic once weekly     History of cardiomyopathy and coronary artery disease  --Continue aspirin, metoprolol, losartan and statins  --EKG with tachycardia.  --Denies any chest pain   --Clinically stable     Hypertension uncontrolled  --Patient with chills and Rigors  --Continue prior to  admission meds  --PRN hydralazine     History of CHF  --Chest x-ray questionable  --BNP negative  --Patient has diastolic dysfunction  --Monitor daily weights I's and O's  --Continue prior to admission meds     Sleep apnea  --Resume CPAP at night     COPD  --Continue prior to admission nebs     Schizoaffective disorder  --Patient is a limited historian   --She lives in a group home  --Will need supportive care  --Continue paliperidone              DVT Prophylaxis: Enoxaparin (Lovenox) SQ  Code Status: Full Code    Disposition: To be decided remains febrile and short of breath requiring supplemental O2      Interval History   Reviewed charts.  Patient continues to have fevers.  Chills are somewhat better.  Continues to have some cough and require supplemental O2.  Chest pain on coughing.  Limited review of system patient has cognitive impairment.  Otherwise review of all data symptoms are negative.    -Data reviewed today: I reviewed all new labs and imaging reports over the last 24 hours. I personally reviewed no images or EKG's today.    Physical Exam   Temp: 99.4  F (37.4  C) Temp src: Oral BP: 161/72 Pulse: 99 Heart Rate: 85 Resp: 18 SpO2: 93 % O2 Device: Nasal cannula Oxygen Delivery: 2 LPM  Vitals:    07/28/19 1700 07/29/19 0532   Weight: 100.2 kg (221 lb) 98 kg (216 lb)     Vital Signs with Ranges  Temp:  [99.4  F (37.4  C)-102.7  F (39.3  C)] 99.4  F (37.4  C)  Pulse:  [] 99  Heart Rate:  [] 85  Resp:  [18-24] 18  BP: (112-188)/() 161/72  SpO2:  [82 %-99 %] 93 %  I/O last 3 completed shifts:  In: 2130 [P.O.:1030; I.V.:1100]  Out: 2300 [Urine:300; Stool:2000]      GEN:  Alert, oriented x 1, uncomfortable with rigors  HEENT:  Normocephalic/atraumatic, no scleral icterus, no nasal discharge, mouth dry.  CV:  Regular rate. no murmur or JVD.  S1 + S2 noted, no S3 or S4.  LUNGS: Poor inspiratory effort, right basilar crackles. symmetric chest rise on inhalation noted.  ABD:  Active bowel sounds,  soft, non-tender/non-distended.  No rebound/guarding/rigidity.  EXT:  No edema.  No cyanosis.    SKIN:  Dry to touch, no exanthems noted in the visualized areas.  NEURO:  Symmetric muscle strength,  No new focal deficits appreciated.    Medications       aspirin  81 mg Oral Daily     atorvastatin  80 mg Oral QPM     azithromycin  250 mg Intravenous Q24H     benztropine  1 mg Oral BID     calcium carbonate 500 mg (elemental)  1,500 mg Oral Daily     clonazePAM  0.5 mg Oral At Bedtime     enoxaparin  40 mg Subcutaneous Q24H     gabapentin  900 mg Oral TID     insulin aspart  25 Units Subcutaneous TID w/meals     insulin aspart  1-7 Units Subcutaneous TID AC     insulin aspart  1-5 Units Subcutaneous At Bedtime     insulin glargine  60 Units Subcutaneous At Bedtime     ipratropium - albuterol 0.5 mg/2.5 mg/3 mL  1 vial Nebulization 4x daily     losartan  50 mg Oral Daily     melatonin  10 mg Oral At Bedtime     metoprolol succinate ER  25 mg Oral Daily     paliperidone ER  9 mg Oral QAM     piperacillin-tazobactam  4.5 g Intravenous Q6H     polyethylene glycol  17 g Oral Every Other Day     ranitidine  300 mg Oral At Bedtime     sertraline  200 mg Oral Daily     sodium chloride (PF)  3 mL Intracatheter Q8H     topiramate  75 mg Oral BID     Data     Recent Labs   Lab 07/29/19  0630 07/28/19  1141   WBC 24.2* 16.4*   HGB 9.3* 11.7   HCT 29.6* 37.6   MCV 97 98    224     Recent Labs   Lab 07/29/19  0630 07/28/19  1141    139   POTASSIUM 4.5 4.1   CHLORIDE 111* 108   CO2 21 24   ANIONGAP 5 7   * 112*   BUN 24 30   CR 1.12* 1.13*   GFRESTIMATED 54* 53*   GFRESTBLACK 62 62   ALLEN 8.3* 9.3     Recent Labs   Lab 07/28/19  1141   CULT No growth after 16 hours  No growth after 16 hours     Recent Labs   Lab 07/28/19  1141   NTBNPI 43     Recent Labs   Lab 07/28/19  1141   AST 17   ALT 23   ALKPHOS 97   BILITOTAL 0.2     No results for input(s): INR in the last 168 hours.  Recent Labs   Lab 07/28/19  1400  07/28/19  1141   LACT 2.2* 3.3*     No results for input(s): LIPASE in the last 168 hours.  No results for input(s): TSH in the last 168 hours.  Recent Labs   Lab 07/28/19  1141   TROPI <0.015     Recent Labs   Lab 07/28/19  1150   COLOR Light Yellow   APPEARANCE Clear   URINEGLC Negative   URINEBILI Negative   URINEKETONE Negative   SG 1.024   UBLD Negative   URINEPH 6.0   PROTEIN Negative   NITRITE Negative   LEUKEST Negative   RBCU 1   WBCU <1       Recent Results (from the past 24 hour(s))   XR Chest 2 Views    Narrative    CHEST TWO VIEWS  7/28/2019 12:20 PM     HISTORY:  Fever, cough.    COMPARISON: 5/15/2019 radiographs.    FINDINGS: Enlargement of the cardiac silhouette again noted. The  pulmonary vasculature is more prominent. There is some new  peribronchial cuffing. There is also a mild degree of ill-defined  reticulonodular lung disease diffusely, new. No effusions.      Impression    IMPRESSION: The constellation of findings could all be related to  congestive heart failure with interstitial and alveolar edema, but a  diffuse infectious/inflammatory infiltrate could also give this  appearance.    CECY LANGE MD

## 2019-07-29 NOTE — PLAN OF CARE
A/O. Denies pain/shortness of breath. MEYERS. 2 LPM nasal cannula. , 150. 2 loose BMs this shift. Ambulating to restroom with A x 2, gait belt and walker. Getting IV zosyn. Will continue with POC.

## 2019-07-29 NOTE — PLAN OF CARE
Please see flowsheets for detailed vital signs and assessments.   Neuro: A&O  Vital Signs: stable ex TMAX 100.3  Pain: denies  O2: mid 90s 2.5 L NC  Tele: SR  Respiratory: LS diminished  GI: WDL ex incontinence, loose BM  : voiding w/o difficulty, incontinent at times  Skin: WDL  Activity: Assist 2 to pivot to commode, cheikh steady for ambulation  IVF: saline locked  Notable labs: WBC 16.4, Lactic 2.2; blood sugar measured in 80s, corrected and sugar increased to 276.  Protocols: standard K, Mag, both WNL  Consults: NA  Plan: IV antibiotics, fever/pain management, blood glucose monitoring  Discharge: 2+

## 2019-07-30 LAB
ANION GAP SERPL CALCULATED.3IONS-SCNC: 8 MMOL/L (ref 3–14)
BASOPHILS # BLD AUTO: 0.1 10E9/L (ref 0–0.2)
BASOPHILS NFR BLD AUTO: 0.5 %
BUN SERPL-MCNC: 14 MG/DL (ref 7–30)
CALCIUM SERPL-MCNC: 8.8 MG/DL (ref 8.5–10.1)
CHLORIDE SERPL-SCNC: 112 MMOL/L (ref 94–109)
CO2 SERPL-SCNC: 21 MMOL/L (ref 20–32)
CREAT SERPL-MCNC: 0.93 MG/DL (ref 0.52–1.04)
DIFFERENTIAL METHOD BLD: ABNORMAL
EOSINOPHIL # BLD AUTO: 0.3 10E9/L (ref 0–0.7)
EOSINOPHIL NFR BLD AUTO: 2.1 %
ERYTHROCYTE [DISTWIDTH] IN BLOOD BY AUTOMATED COUNT: 12.6 % (ref 10–15)
GFR SERPL CREATININE-BSD FRML MDRD: 68 ML/MIN/{1.73_M2}
GLUCOSE BLDC GLUCOMTR-MCNC: 105 MG/DL (ref 70–99)
GLUCOSE BLDC GLUCOMTR-MCNC: 140 MG/DL (ref 70–99)
GLUCOSE BLDC GLUCOMTR-MCNC: 157 MG/DL (ref 70–99)
GLUCOSE BLDC GLUCOMTR-MCNC: 206 MG/DL (ref 70–99)
GLUCOSE BLDC GLUCOMTR-MCNC: 99 MG/DL (ref 70–99)
GLUCOSE SERPL-MCNC: 100 MG/DL (ref 70–99)
HCT VFR BLD AUTO: 30.1 % (ref 35–47)
HGB BLD-MCNC: 9.6 G/DL (ref 11.7–15.7)
IMM GRANULOCYTES # BLD: 0.3 10E9/L (ref 0–0.4)
IMM GRANULOCYTES NFR BLD: 2 %
LYMPHOCYTES # BLD AUTO: 2.3 10E9/L (ref 0.8–5.3)
LYMPHOCYTES NFR BLD AUTO: 17.5 %
MCH RBC QN AUTO: 30.4 PG (ref 26.5–33)
MCHC RBC AUTO-ENTMCNC: 31.9 G/DL (ref 31.5–36.5)
MCV RBC AUTO: 95 FL (ref 78–100)
MONOCYTES # BLD AUTO: 0.9 10E9/L (ref 0–1.3)
MONOCYTES NFR BLD AUTO: 6.6 %
NEUTROPHILS # BLD AUTO: 9.3 10E9/L (ref 1.6–8.3)
NEUTROPHILS NFR BLD AUTO: 71.3 %
NRBC # BLD AUTO: 0 10*3/UL
NRBC BLD AUTO-RTO: 0 /100
PLATELET # BLD AUTO: 177 10E9/L (ref 150–450)
POTASSIUM SERPL-SCNC: 4.5 MMOL/L (ref 3.4–5.3)
RBC # BLD AUTO: 3.16 10E12/L (ref 3.8–5.2)
SODIUM SERPL-SCNC: 141 MMOL/L (ref 133–144)
WBC # BLD AUTO: 13 10E9/L (ref 4–11)

## 2019-07-30 PROCEDURE — 25000128 H RX IP 250 OP 636: Performed by: INTERNAL MEDICINE

## 2019-07-30 PROCEDURE — 94640 AIRWAY INHALATION TREATMENT: CPT | Mod: 76

## 2019-07-30 PROCEDURE — 85025 COMPLETE CBC W/AUTO DIFF WBC: CPT | Performed by: INTERNAL MEDICINE

## 2019-07-30 PROCEDURE — 25000125 ZZHC RX 250: Performed by: INTERNAL MEDICINE

## 2019-07-30 PROCEDURE — 40000275 ZZH STATISTIC RCP TIME EA 10 MIN

## 2019-07-30 PROCEDURE — 25000131 ZZH RX MED GY IP 250 OP 636 PS 637: Mod: GY | Performed by: INTERNAL MEDICINE

## 2019-07-30 PROCEDURE — 36415 COLL VENOUS BLD VENIPUNCTURE: CPT | Performed by: INTERNAL MEDICINE

## 2019-07-30 PROCEDURE — 80048 BASIC METABOLIC PNL TOTAL CA: CPT | Performed by: INTERNAL MEDICINE

## 2019-07-30 PROCEDURE — 25000132 ZZH RX MED GY IP 250 OP 250 PS 637: Mod: GY | Performed by: INTERNAL MEDICINE

## 2019-07-30 PROCEDURE — 00000146 ZZHCL STATISTIC GLUCOSE BY METER IP

## 2019-07-30 PROCEDURE — 99232 SBSQ HOSP IP/OBS MODERATE 35: CPT | Performed by: INTERNAL MEDICINE

## 2019-07-30 PROCEDURE — 12000000 ZZH R&B MED SURG/OB

## 2019-07-30 PROCEDURE — 94640 AIRWAY INHALATION TREATMENT: CPT

## 2019-07-30 RX ADMIN — Medication 10 MG: at 22:29

## 2019-07-30 RX ADMIN — CALCIUM 1500 MG: 500 TABLET ORAL at 08:58

## 2019-07-30 RX ADMIN — IPRATROPIUM BROMIDE AND ALBUTEROL SULFATE 3 ML: .5; 3 SOLUTION RESPIRATORY (INHALATION) at 11:34

## 2019-07-30 RX ADMIN — TAZOBACTAM SODIUM AND PIPERACILLIN SODIUM 4.5 G: 500; 4 INJECTION, SOLUTION INTRAVENOUS at 12:31

## 2019-07-30 RX ADMIN — CLONAZEPAM 0.5 MG: 0.5 TABLET ORAL at 22:29

## 2019-07-30 RX ADMIN — TAZOBACTAM SODIUM AND PIPERACILLIN SODIUM 4.5 G: 500; 4 INJECTION, SOLUTION INTRAVENOUS at 23:34

## 2019-07-30 RX ADMIN — TAZOBACTAM SODIUM AND PIPERACILLIN SODIUM 4.5 G: 500; 4 INJECTION, SOLUTION INTRAVENOUS at 18:32

## 2019-07-30 RX ADMIN — IPRATROPIUM BROMIDE AND ALBUTEROL SULFATE 3 ML: .5; 3 SOLUTION RESPIRATORY (INHALATION) at 20:40

## 2019-07-30 RX ADMIN — GABAPENTIN 900 MG: 300 CAPSULE ORAL at 16:03

## 2019-07-30 RX ADMIN — BENZTROPINE MESYLATE 1 MG: 1 TABLET ORAL at 08:58

## 2019-07-30 RX ADMIN — RANITIDINE 300 MG: 150 TABLET ORAL at 22:29

## 2019-07-30 RX ADMIN — PALIPERIDONE 9 MG: 3 TABLET, FILM COATED, EXTENDED RELEASE ORAL at 08:58

## 2019-07-30 RX ADMIN — SERTRALINE HYDROCHLORIDE 200 MG: 100 TABLET ORAL at 08:58

## 2019-07-30 RX ADMIN — GABAPENTIN 900 MG: 300 CAPSULE ORAL at 08:58

## 2019-07-30 RX ADMIN — ENOXAPARIN SODIUM 40 MG: 40 INJECTION SUBCUTANEOUS at 16:03

## 2019-07-30 RX ADMIN — TOPIRAMATE 75 MG: 50 TABLET ORAL at 08:58

## 2019-07-30 RX ADMIN — GABAPENTIN 900 MG: 300 CAPSULE ORAL at 22:29

## 2019-07-30 RX ADMIN — ATORVASTATIN CALCIUM 80 MG: 40 TABLET, FILM COATED ORAL at 21:12

## 2019-07-30 RX ADMIN — TOPIRAMATE 75 MG: 50 TABLET ORAL at 21:11

## 2019-07-30 RX ADMIN — METOPROLOL SUCCINATE 25 MG: 25 TABLET, EXTENDED RELEASE ORAL at 08:58

## 2019-07-30 RX ADMIN — INSULIN ASPART 25 UNITS: 100 INJECTION, SOLUTION INTRAVENOUS; SUBCUTANEOUS at 08:57

## 2019-07-30 RX ADMIN — ASPIRIN 81 MG: 81 TABLET, COATED ORAL at 08:58

## 2019-07-30 RX ADMIN — BENZTROPINE MESYLATE 1 MG: 1 TABLET ORAL at 21:12

## 2019-07-30 RX ADMIN — IPRATROPIUM BROMIDE AND ALBUTEROL SULFATE 3 ML: .5; 3 SOLUTION RESPIRATORY (INHALATION) at 07:53

## 2019-07-30 RX ADMIN — TAZOBACTAM SODIUM AND PIPERACILLIN SODIUM 4.5 G: 500; 4 INJECTION, SOLUTION INTRAVENOUS at 06:01

## 2019-07-30 RX ADMIN — LOSARTAN POTASSIUM 50 MG: 50 TABLET, FILM COATED ORAL at 08:58

## 2019-07-30 RX ADMIN — INSULIN GLARGINE 60 UNITS: 100 INJECTION, SOLUTION SUBCUTANEOUS at 22:28

## 2019-07-30 RX ADMIN — AZITHROMYCIN 250 MG: 250 TABLET, FILM COATED ORAL at 16:03

## 2019-07-30 RX ADMIN — IPRATROPIUM BROMIDE AND ALBUTEROL SULFATE 3 ML: .5; 3 SOLUTION RESPIRATORY (INHALATION) at 15:25

## 2019-07-30 ASSESSMENT — MIFFLIN-ST. JEOR: SCORE: 1559.29

## 2019-07-30 ASSESSMENT — ACTIVITIES OF DAILY LIVING (ADL)
ADLS_ACUITY_SCORE: 25

## 2019-07-30 NOTE — PLAN OF CARE
A/O. Denies pain. Reports shortness of breath at times. Tolerating 1 LPM nasal cannula. Try to wean off O2. BG 99. Plan for one more day of IV abx with possible discharge tomorrow. Up with A x1, gait belt and walker. Will continue with POC.

## 2019-07-30 NOTE — PLAN OF CARE
Please see flowsheets for detailed vital signs and assessments.   Neuro: A&OX4  Vital Signs: stable  Pain: denies  O2: mid 90s RA  Tele: SR w/  1st degree   Respiratory: LS Diminished, cough  GI: WDL  : voiding w/o difficulty - incontinent at times  Skin: WDL  Activity: 1 Assist with gait belt  IVF: saline locked  Notable labs: Cl, Crt, Ca, WBC, hematocrit, Hct  Protocols: Daily, Tele  Plan: Continue with antibiotics  Discharge: TBD

## 2019-07-30 NOTE — PLAN OF CARE
DX :   Sepsis secondary to PNA   HX :type 2 diabetes mellitus, uterine cancer, takotsubo cardiomyopathy, schizoaffective disorder, obstructive sleep apnea, depression, migraine, irritable bowel syndrome, hypertension, hypercholesterolemia, coronary artery disease and chronic low back pain   LABS :  and 223, HGB 10.2, K 4.5  TELE:   SR/ 1st degree heat block.  GI/ : Incontinent of Bladder, but continent of Bowel.  DIET :  Mod carb diet  ASSESS; Vitals stable, landrum catheter in place.   PAIN : Denied pain.  ACTIVITY :  SBA with transferring .l  TEACHING : safety use of call light for help.  PLAN FOR DISCHARGE : TDB

## 2019-07-30 NOTE — PROGRESS NOTES
[facility]    Hospitalist Progress Note  Name: Nena Tang    MRN: [id number]  Provider: Norma Baldwin MD  Date of Service: 07/30/2019    Assessment & Plan   Summary of Stay: Nena Tang is a 59 year old female who was admitted on 7/28/2019 for sepsis secondary to pneumonia.  Her past medical history significant for type 2 diabetes mellitus, uterine cancer, takotsubo cardiomyopathy, schizoaffective disorder, obstructive sleep apnea, depression, migraine, irritable bowel syndrome, hypertension, hypercholesterolemia, coronary artery disease and chronic low back pain presented to the emergency room with Rigor's fever and cough.  Hypoxic, chest x-ray was equivocal for pneumonia versus CHF, had elevated white cell count, lactic acidosis, she was febrile and tachycardic.      Sepsis secondary to pneumonia  --Presented with high-grade fever, tachycardia, leukocytosis, lactic acidosis, hypoxia  --Chest x-ray pneumonia versus infiltrate  --Clinical presentation suggestive of pneumonia with history of productive cough and hypoxia  --Blood culture sent in the emergency room  --Received vancomycin and Zosyn in the emergency room  --Patient with community-acquired pneumonia is high risk will continue on Zosyn and azithromycin  --Continues O2 sat monitoring  --Wean supplemental O2 as able    Drop in hemoglobin with no evidence of acute bleeding  --hgb stable  --Monitor vitals        Type 2 diabetes mellitus poorly controlled last hemoglobin A1c in May was 7.6  --Resume long-acting insulin 60 units of glargine, 25 units of NovoLog with meals 3 times daily  --Sliding scale:  --Prior to admission also on Ozempic once weekly     History of cardiomyopathy and coronary artery disease  --Continue aspirin, metoprolol, losartan and statins  --EKG with tachycardia.  --Denies any chest pain   --Clinically stable     Hypertension uncontrolled  --Patient with chills and Rigors  --Continue prior to admission  meds  --PRN hydralazine     History of CHF  --Chest x-ray questionable  --BNP negative  --Patient has diastolic dysfunction  --Monitor daily weights I's and O's  --Continue prior to admission meds     Sleep apnea  --Resume CPAP at night     COPD  --Continue prior to admission nebs     Schizoaffective disorder  --Patient is a limited historian   --She lives in a group home  --Will need supportive care  --Continue paliperidone              DVT Prophylaxis: Enoxaparin (Lovenox) SQ  Code Status: Full Code    Disposition: To be decided remains febrile and short of breath requiring supplemental O2      Interval History   Reviewed charts.  She is afebrile now.  Continues to require supplemental O2.  Continues to have some cough but improved subjectively otherwise review of all data symptoms are negative.    -Data reviewed today: I reviewed all new labs and imaging reports over the last 24 hours. I personally reviewed no images or EKG's today.    Physical Exam   Temp: 98.2  F (36.8  C) Temp src: Axillary BP: (!) 153/72 Pulse: 80 Heart Rate: 82 Resp: 20 SpO2: 95 % O2 Device: Nasal cannula Oxygen Delivery: 1 LPM  Vitals:    07/28/19 1700 07/29/19 0532 07/30/19 0639   Weight: 100.2 kg (221 lb) 98 kg (216 lb) 105.8 kg (233 lb 3.2 oz)     Vital Signs with Ranges  Temp:  [96.8  F (36  C)-98.4  F (36.9  C)] 98.2  F (36.8  C)  Pulse:  [80] 80  Heart Rate:  [78-92] 82  Resp:  [18-20] 20  BP: (153-168)/(67-73) 153/72  SpO2:  [95 %-98 %] 95 %  I/O last 3 completed shifts:  In: 720 [P.O.:720]  Out: 960 [Urine:960]      GEN:  Alert, oriented x 1, uncomfortable with rigors  HEENT:  Normocephalic/atraumatic, no scleral icterus, no nasal discharge, mouth dry.  CV:  Regular rate. no murmur or JVD.  S1 + S2 noted, no S3 or S4.  LUNGS: Poor inspiratory effort, right basilar crackles. symmetric chest rise on inhalation noted.  ABD:  Active bowel sounds, soft, non-tender/non-distended.  No rebound/guarding/rigidity.  EXT:  No edema.  No  cyanosis.    SKIN:  Dry to touch, no exanthems noted in the visualized areas.  NEURO:  Symmetric muscle strength,  No new focal deficits appreciated.    Medications       aspirin  81 mg Oral Daily     atorvastatin  80 mg Oral QPM     azithromycin  250 mg Oral Q24H     benztropine  1 mg Oral BID     calcium carbonate 500 mg (elemental)  1,500 mg Oral Daily     clonazePAM  0.5 mg Oral At Bedtime     enoxaparin  40 mg Subcutaneous Q24H     gabapentin  900 mg Oral TID     insulin aspart  25 Units Subcutaneous TID w/meals     insulin aspart  1-7 Units Subcutaneous TID AC     insulin aspart  1-5 Units Subcutaneous At Bedtime     insulin glargine  60 Units Subcutaneous At Bedtime     ipratropium - albuterol 0.5 mg/2.5 mg/3 mL  1 vial Nebulization 4x daily     losartan  50 mg Oral Daily     melatonin  10 mg Oral At Bedtime     metoprolol succinate ER  25 mg Oral Daily     paliperidone ER  9 mg Oral QAM     piperacillin-tazobactam  4.5 g Intravenous Q6H     polyethylene glycol  17 g Oral Every Other Day     ranitidine  300 mg Oral At Bedtime     sertraline  200 mg Oral Daily     sodium chloride (PF)  3 mL Intracatheter Q8H     topiramate  75 mg Oral BID     Data     Recent Labs   Lab 07/30/19  0710 07/29/19  0912 07/29/19  0630   WBC 13.0* 20.6* 24.2*   HGB 9.6* 10.2* 9.3*   HCT 30.1* 32.9* 29.6*   MCV 95 99 97    182 174     Recent Labs   Lab 07/30/19  0710 07/29/19  0630 07/28/19  1141    137 139   POTASSIUM 4.5 4.5 4.1   CHLORIDE 112* 111* 108   CO2 21 21 24   ANIONGAP 8 5 7   * 134* 112*   BUN 14 24 30   CR 0.93 1.12* 1.13*   GFRESTIMATED 68 54* 53*   GFRESTBLACK 79 62 62   ALLEN 8.8 8.3* 9.3     Recent Labs   Lab 07/28/19  1141   CULT No growth after 2 days  No growth after 2 days     Recent Labs   Lab 07/28/19  1141   NTBNPI 43     Recent Labs   Lab 07/28/19  1141   AST 17   ALT 23   ALKPHOS 97   BILITOTAL 0.2     No results for input(s): INR in the last 168 hours.  Recent Labs   Lab 07/28/19  1400  07/28/19  1141   LACT 2.2* 3.3*     No results for input(s): LIPASE in the last 168 hours.  No results for input(s): TSH in the last 168 hours.  Recent Labs   Lab 07/28/19  1141   TROPI <0.015     Recent Labs   Lab 07/28/19  1150   COLOR Light Yellow   APPEARANCE Clear   URINEGLC Negative   URINEBILI Negative   URINEKETONE Negative   SG 1.024   UBLD Negative   URINEPH 6.0   PROTEIN Negative   NITRITE Negative   LEUKEST Negative   RBCU 1   WBCU <1       No results found for this or any previous visit (from the past 24 hour(s)).

## 2019-07-31 VITALS
SYSTOLIC BLOOD PRESSURE: 146 MMHG | HEIGHT: 60 IN | BODY MASS INDEX: 45.07 KG/M2 | DIASTOLIC BLOOD PRESSURE: 66 MMHG | OXYGEN SATURATION: 93 % | HEART RATE: 80 BPM | RESPIRATION RATE: 18 BRPM | WEIGHT: 229.6 LBS | TEMPERATURE: 98 F

## 2019-07-31 LAB
ANION GAP SERPL CALCULATED.3IONS-SCNC: 6 MMOL/L (ref 3–14)
BASOPHILS # BLD AUTO: 0 10E9/L (ref 0–0.2)
BASOPHILS NFR BLD AUTO: 0.5 %
BUN SERPL-MCNC: 13 MG/DL (ref 7–30)
CALCIUM SERPL-MCNC: 9.3 MG/DL (ref 8.5–10.1)
CHLORIDE SERPL-SCNC: 109 MMOL/L (ref 94–109)
CO2 SERPL-SCNC: 25 MMOL/L (ref 20–32)
CREAT SERPL-MCNC: 0.84 MG/DL (ref 0.52–1.04)
CREAT SERPL-MCNC: 0.92 MG/DL (ref 0.52–1.04)
DIFFERENTIAL METHOD BLD: ABNORMAL
EOSINOPHIL # BLD AUTO: 0.2 10E9/L (ref 0–0.7)
EOSINOPHIL NFR BLD AUTO: 2.7 %
ERYTHROCYTE [DISTWIDTH] IN BLOOD BY AUTOMATED COUNT: 12.6 % (ref 10–15)
GFR SERPL CREATININE-BSD FRML MDRD: 68 ML/MIN/{1.73_M2}
GFR SERPL CREATININE-BSD FRML MDRD: 76 ML/MIN/{1.73_M2}
GLUCOSE BLDC GLUCOMTR-MCNC: 130 MG/DL (ref 70–99)
GLUCOSE BLDC GLUCOMTR-MCNC: 149 MG/DL (ref 70–99)
GLUCOSE SERPL-MCNC: 92 MG/DL (ref 70–99)
HCT VFR BLD AUTO: 31.4 % (ref 35–47)
HGB BLD-MCNC: 9.5 G/DL (ref 11.7–15.7)
IMM GRANULOCYTES # BLD: 0.1 10E9/L (ref 0–0.4)
IMM GRANULOCYTES NFR BLD: 0.7 %
LACTATE BLD-SCNC: 1.8 MMOL/L (ref 0.7–2)
LYMPHOCYTES # BLD AUTO: 1.8 10E9/L (ref 0.8–5.3)
LYMPHOCYTES NFR BLD AUTO: 21.3 %
MCH RBC QN AUTO: 29.8 PG (ref 26.5–33)
MCHC RBC AUTO-ENTMCNC: 30.3 G/DL (ref 31.5–36.5)
MCV RBC AUTO: 98 FL (ref 78–100)
MONOCYTES # BLD AUTO: 0.8 10E9/L (ref 0–1.3)
MONOCYTES NFR BLD AUTO: 8.7 %
NEUTROPHILS # BLD AUTO: 5.7 10E9/L (ref 1.6–8.3)
NEUTROPHILS NFR BLD AUTO: 66.1 %
NRBC # BLD AUTO: 0 10*3/UL
NRBC BLD AUTO-RTO: 0 /100
PLATELET # BLD AUTO: 179 10E9/L (ref 150–450)
POTASSIUM SERPL-SCNC: 4.2 MMOL/L (ref 3.4–5.3)
RBC # BLD AUTO: 3.19 10E12/L (ref 3.8–5.2)
SODIUM SERPL-SCNC: 140 MMOL/L (ref 133–144)
WBC # BLD AUTO: 8.6 10E9/L (ref 4–11)

## 2019-07-31 PROCEDURE — 40000275 ZZH STATISTIC RCP TIME EA 10 MIN

## 2019-07-31 PROCEDURE — 83605 ASSAY OF LACTIC ACID: CPT | Performed by: INTERNAL MEDICINE

## 2019-07-31 PROCEDURE — 94640 AIRWAY INHALATION TREATMENT: CPT | Mod: 76

## 2019-07-31 PROCEDURE — 25000132 ZZH RX MED GY IP 250 OP 250 PS 637: Mod: GY | Performed by: INTERNAL MEDICINE

## 2019-07-31 PROCEDURE — 25000125 ZZHC RX 250: Performed by: INTERNAL MEDICINE

## 2019-07-31 PROCEDURE — 85025 COMPLETE CBC W/AUTO DIFF WBC: CPT | Performed by: INTERNAL MEDICINE

## 2019-07-31 PROCEDURE — 00000146 ZZHCL STATISTIC GLUCOSE BY METER IP

## 2019-07-31 PROCEDURE — 80048 BASIC METABOLIC PNL TOTAL CA: CPT | Performed by: INTERNAL MEDICINE

## 2019-07-31 PROCEDURE — 36415 COLL VENOUS BLD VENIPUNCTURE: CPT | Performed by: INTERNAL MEDICINE

## 2019-07-31 PROCEDURE — 82565 ASSAY OF CREATININE: CPT | Performed by: INTERNAL MEDICINE

## 2019-07-31 PROCEDURE — 25000128 H RX IP 250 OP 636: Performed by: INTERNAL MEDICINE

## 2019-07-31 PROCEDURE — 94640 AIRWAY INHALATION TREATMENT: CPT

## 2019-07-31 PROCEDURE — 99238 HOSP IP/OBS DSCHRG MGMT 30/<: CPT | Performed by: INTERNAL MEDICINE

## 2019-07-31 RX ORDER — AZITHROMYCIN 250 MG/1
250 TABLET, FILM COATED ORAL DAILY
Qty: 4 TABLET | Refills: 0 | Status: SHIPPED | OUTPATIENT
Start: 2019-07-31 | End: 2019-08-06

## 2019-07-31 RX ORDER — GUAIFENESIN 200 MG/10ML
200 LIQUID ORAL
Qty: 118 ML | Refills: 0 | Status: ON HOLD | OUTPATIENT
Start: 2019-07-31 | End: 2019-12-21

## 2019-07-31 RX ORDER — LIDOCAINE 50 MG/G
1 PATCH TOPICAL EVERY 24 HOURS
Qty: 10 PATCH | Refills: 0 | Status: ON HOLD | OUTPATIENT
Start: 2019-07-31 | End: 2019-12-21

## 2019-07-31 RX ADMIN — METOPROLOL SUCCINATE 25 MG: 25 TABLET, EXTENDED RELEASE ORAL at 09:13

## 2019-07-31 RX ADMIN — GABAPENTIN 900 MG: 300 CAPSULE ORAL at 09:13

## 2019-07-31 RX ADMIN — PALIPERIDONE 9 MG: 3 TABLET, FILM COATED, EXTENDED RELEASE ORAL at 09:13

## 2019-07-31 RX ADMIN — ASPIRIN 81 MG: 81 TABLET, COATED ORAL at 09:14

## 2019-07-31 RX ADMIN — IPRATROPIUM BROMIDE AND ALBUTEROL SULFATE 3 ML: .5; 3 SOLUTION RESPIRATORY (INHALATION) at 11:59

## 2019-07-31 RX ADMIN — CALCIUM 1500 MG: 500 TABLET ORAL at 09:14

## 2019-07-31 RX ADMIN — TAZOBACTAM SODIUM AND PIPERACILLIN SODIUM 4.5 G: 500; 4 INJECTION, SOLUTION INTRAVENOUS at 06:20

## 2019-07-31 RX ADMIN — LOSARTAN POTASSIUM 50 MG: 50 TABLET, FILM COATED ORAL at 09:13

## 2019-07-31 RX ADMIN — IPRATROPIUM BROMIDE AND ALBUTEROL SULFATE 3 ML: .5; 3 SOLUTION RESPIRATORY (INHALATION) at 08:02

## 2019-07-31 RX ADMIN — TAZOBACTAM SODIUM AND PIPERACILLIN SODIUM 4.5 G: 500; 4 INJECTION, SOLUTION INTRAVENOUS at 14:04

## 2019-07-31 RX ADMIN — SERTRALINE HYDROCHLORIDE 200 MG: 100 TABLET ORAL at 09:14

## 2019-07-31 RX ADMIN — BENZTROPINE MESYLATE 1 MG: 1 TABLET ORAL at 09:14

## 2019-07-31 RX ADMIN — TOPIRAMATE 75 MG: 50 TABLET ORAL at 09:13

## 2019-07-31 ASSESSMENT — ACTIVITIES OF DAILY LIVING (ADL)
ADLS_ACUITY_SCORE: 25

## 2019-07-31 ASSESSMENT — MIFFLIN-ST. JEOR: SCORE: 1542.96

## 2019-07-31 NOTE — PLAN OF CARE
Patient has been assessed for Home Oxygen needs. Oxygen readings:    *Pulse oximetry (SpO2) = 95% on room air at rest while awake.    *SpO2 = 91-92% on room air during activity/with exercise.       Presentation/Diagnosis: Pt admitted on 7/28 with nausea, vomiting, diarrhea, and tremors in all extremities  History: Acute respiratory failure, CAD, Chronic back pain, Fecal urgency, Drug abuse, Hyperlipidemia, HTN, IBS, Migraine, Left cataract, Depression, CINDY, Schizoaffective disorder, Takotsubo cardiomyopathy, DM type 2, Auditory hallucinations  Labs/Protocols: Potassium/Mag Protocol: Mag-1.9(7/28/). K+-4.2, Na-140, BS-92,   Vitals: Temp: 98  F (36.7  C) Temp src: Oral BP: (!) 146/66   Heart Rate: 80 Resp: 18 SpO2: 93 % O2 Device: None (Room air) Oxygen Delivery: 1 LPM  Cardiac: Regular rate and rhythm   Telemetry: SR  Respiratory: Lungs are clear throughout-95% on RA-Continues on 2 ABX for pneumonia   Neuro: A&Ox4  GI/: Incontinent of urine at times-Continent of bowel  Skin: Intact  LDAs: PIV-Left forearm-saline locked   Diet: Mod CHO-Low NA, Low fat-No caffeine  Activity: SBA with walker   Teaching: Pt educated on current POC  Plan: Discharge back to group home today

## 2019-07-31 NOTE — DISCHARGE SUMMARY
[facility]  Discharge Summary  Hospitalist      Date of Admission:  7/28/2019  Date of Discharge:  7/31/2019  Provider:  Norma Baldwin MD  Date of Service (when I last saw the patient): 07/31/19      Primary Provider: Rowena Haas          Discharge Diagnosis:   Discharge Diagnoses   Sepsis secondary to pneumonia    Other medical issues:  Past Medical History:   Diagnosis Date     Acute respiratory failure with hypoxia (H) 9/4/2017     CAD (coronary artery disease)     5/2014 cath, nonbostructive stenosis to LAD, RCA.     Chronic low back pain 1/22/2013     Cocaine abuse, in remission (H)      Fecal urgency 3/8/2012     History of heroin abuse      Hyperlipidemia LDL goal <100 10/31/2010     Hypertension 7/29/2013     Illiterate 8/30/2011     Irritable bowel syndrome      Left cataract      Migraine 4/19/2012     Migraine headache 4/22/2013     Moderate major depression (H) 6/8/2011     Noncompliance with medication regimen 6/8/2011     Obesity      CINDY (obstructive sleep apnea) 3/8/2012    uses CPAP     Osteopenia 10/7/2009     Pneumonia of right lower lobe due to infectious organism (H) 9/4/2017     Schizoaffective disorder, depressive type (H) 2/25/2013     Sepsis (H) 8/29/2017     Suicidal intent 10/2/2013     Takotsubo cardiomyopathy      Type 2 diabetes mellitus (H) 8/30/2011     Uterine cancer (H) 1983     Verbal auditory hallucination 10/4/2012          History of Present Illness   Nena Tang is an 59 year old female who presented with fever, chills cough and shortness of breath please see the admission history and physical for full details.    Hospital Course     Nena Tang was admitted on 7/28/2019 for sepsis secondary to pneumonia. Her past medical history significant for type 2 diabetes mellitus, uterine cancer, takotsubo cardiomyopathy, schizoaffective disorder, obstructive sleep apnea, depression, migraine, irritable bowel syndrome, hypertension,  hypercholesterolemia, coronary artery disease and chronic low back pain presented to the emergency room with Rigors fever and cough.  Hypoxic, chest x-ray was equivocal for pneumonia versus CHF, had elevated white cell count, lactic acidosis, she was febrile and tachycardic.        The following problems were addressed during her hospitalization:    Sepsis secondary to pneumonia  --Presented with high-grade fever, tachycardia, leukocytosis, lactic acidosis, hypoxia  --Chest x-ray pneumonia versus infiltrate  --Clinical presentation suggestive of pneumonia with history of productive cough and hypoxia  --Blood culture sent in the emergency room  --Received vancomycin and Zosyn in the emergency room  --Patient with community-acquired pneumonia is high risk will continue on Zosyn and azithromycin  --Clinically improved on IV antibiotics.  Maintaining sats on room air prior to discharge     Drop in hemoglobin with no evidence of acute bleeding  --hgb stable prior to discharge        Type 2 diabetes mellitus poorly controlled last hemoglobin A1c in May was 7.6  --Resume long-acting insulin 60 units of glargine, 25 units of NovoLog with meals 3 times daily  --Sliding scale:  --Prior to admission also on Ozempic once weekly      History of cardiomyopathy and coronary artery disease  --Continue aspirin, metoprolol, losartan and statins  --EKG with tachycardia.  Heart rate better controlled with treatment of infection  --Denies any chest pain   --Clinically stable     Hypertension uncontrolled  --Patient with chills and Rigors  --Continue prior to admission meds  --PRN hydralazine     History of CHF  --Chest x-ray questionable  --BNP negative  --Patient has diastolic dysfunction  --Monitor daily weights I's and O's  --Continue prior to admission meds.  --No evidence of exacerbation.     Sleep apnea  --Resume CPAP at night     COPD  --Continue prior to admission nebs     Schizoaffective disorder  --Patient is a limited  historian   --She lives in a group home  --Continue supportive care.       Significant Results and Procedures   As noted above    Pending Results   Unresulted Labs Ordered in the Past 30 Days of this Admission     Date and Time Order Name Status Description    7/28/2019 1125 Blood culture Preliminary     7/28/2019 1125 Blood culture Preliminary           Code Status   Full Code       Primary Care Physician   Rowena Haas    Physical Exam   Temp: 98  F (36.7  C) Temp src: Oral BP: (!) 146/66   Heart Rate: 80 Resp: 18 SpO2: 93 % O2 Device: None (Room air) Oxygen Delivery: 1 LPM  Vitals:    07/29/19 0532 07/30/19 0639 07/31/19 0706   Weight: 98 kg (216 lb) 105.8 kg (233 lb 3.2 oz) 104.1 kg (229 lb 9.6 oz)     Vital Signs with Ranges  Temp:  [97.6  F (36.4  C)-98.6  F (37  C)] 98  F (36.7  C)  Heart Rate:  [74-83] 80  Resp:  [16-24] 18  BP: (119-161)/(58-66) 146/66  SpO2:  [91 %-98 %] 93 %  I/O last 3 completed shifts:  In: 880 [P.O.:880]  Out: 1475 [Urine:1475]    GEN:  Alert, oriented x 1, uncomfortable with rigors  HEENT:  Normocephalic/atraumatic, no scleral icterus, no nasal discharge, mouth dry.  CV:  Regular rate. no murmur or JVD.  S1 + S2 noted, no S3 or S4.  LUNGS: Poor inspiratory effort, right basilar crackles. symmetric chest rise on inhalation noted.  ABD:  Active bowel sounds, soft, non-tender/non-distended.  No rebound/guarding/rigidity.  EXT:  No edema.  No cyanosis.    SKIN:  Dry to touch, no exanthems noted in the visualized areas.  NEURO:  Symmetric muscle strength,  No new focal deficits appreciated.      Discharge Disposition   Discharged to home    Consultations This Hospital Stay   PHARMACY TO DOSE VANCO    Time Spent on this Encounter   Norma HUSSEIN, personally saw the patient today and spent greater than 30 minutes discharging this patient.    Discharge Orders      Reason for your hospital stay    Please refer to discharge summary.  Briefly admitted for pneumonia treated with  IV antibiotics with good response.  Prior to discharge patient was off supplemental O2.     Follow-up and recommended labs and tests     Follow up with primary care provider, Rowena Haas, within 7 days for hospital follow- up.  The following labs/tests are recommended: Blood glucose  Primary care provider to adjust diabetes medication.     Activity    Your activity upon discharge: activity as tolerated     Monitor and record    blood glucose 4 times a day, before meals and at bedtime  blood pressure daily  pulse daily  weight every day     Follow-up and recommended labs and tests     Patient had loose stools during hospitalization.  Laxatives including senna and MiraLAX was held.  Can be resumed if needed for constipation     Full Code     Diet    Follow this diet upon discharge: Orders Placed This Encounter      Combination Diet 5781-3364 Calories: Moderate Consistent CHO (4-6 CHO units/meal); Low Saturated Fat Na <2400mg Diet, No Caffeine Diet     Discharge Medications   Current Discharge Medication List      START taking these medications    Details   azithromycin (ZITHROMAX) 250 MG tablet Take 1 tablet (250 mg) by mouth daily for 2 days  Qty: 4 tablet, Refills: 0    Associated Diagnoses: Pneumonia of left lower lobe due to infectious organism (H)         CONTINUE these medications which have CHANGED    Details   guaiFENesin (ROBITUSSIN) 100 MG/5ML liquid Take 10 mLs (200 mg) by mouth nightly as needed for cough  Qty: 118 mL, Refills: 0    Associated Diagnoses: Cough      lidocaine (LIDODERM) 5 % patch Place 1 patch onto the skin every 24 hours  Qty: 10 patch, Refills: 0    Associated Diagnoses: Acute left-sided low back pain with sciatica, sciatica laterality unspecified         CONTINUE these medications which have NOT CHANGED    Details   acetaminophen (TYLENOL) 500 MG tablet Take 2 tablets (1,000 mg) by mouth 3 times daily as needed for mild pain  Qty: 100 tablet, Refills: 3    Associated Diagnoses:  Chronic low back pain, unspecified back pain laterality, with sciatica presence unspecified      aspirin (ASA) 81 MG EC tablet TAKE 1 TABLET (81MG) BY MOUTH DAILY  Qty: 30 tablet, Refills: 3    Associated Diagnoses: Coronary artery disease involving native heart with angina pectoris, unspecified vessel or lesion type (H)      atorvastatin (LIPITOR) 80 MG tablet TAKE 1 TABLET (80MG) BY MOUTH DAILY  Qty: 30 tablet, Refills: 11    Associated Diagnoses: Hyperlipidemia LDL goal <100      benztropine (COGENTIN) 0.5 MG tablet Take 2 tablets (1 mg) by mouth 2 times daily  Qty: 120 tablet, Refills: 3    Associated Diagnoses: Extrapyramidal and movement disorder      blood glucose (NO BRAND SPECIFIED) test strip Use to test blood sugar  4 times daily or as directed. To accompany: Blood Glucose Monitor Brands: per insurance.  Qty: 100 strip, Refills: 11    Associated Diagnoses: Type 2 diabetes mellitus with mild nonproliferative retinopathy without macular edema, with long-term current use of insulin, unspecified laterality (H)      calcium carbonate (OS-ALLEN) 1500 (600 Ca) MG tablet Take 3 tablets (1,800 mg) by mouth daily  Qty: 180 tablet, Refills: 3    Associated Diagnoses: Other osteoporosis without current pathological fracture      clonazePAM (KLONOPIN) 0.5 MG tablet Take 1 tablet (0.5 mg) by mouth At Bedtime  Qty: 30 tablet, Refills: 0    Associated Diagnoses: Schizoaffective disorder, bipolar type (H)      gabapentin (NEURONTIN) 300 MG capsule Take 3 capsules (900 mg) by mouth 3 times daily  Qty: 270 capsule, Refills: 1    Associated Diagnoses: Cervicalgia      guaiFENesin (ROBITUSSIN) 100 MG/5ML SYRP Take 10 mLs by mouth nightly as needed for cough      haloperidol (HALDOL) 10 MG tablet Take 5 mg by mouth 2 times daily as needed for agitation      ibuprofen (ADVIL/MOTRIN) 200 MG tablet Take 200 mg by mouth every 8 hours as needed for mild pain      insulin aspart (NOVOLOG FLEXPEN) 100 UNIT/ML pen Inject 25 Units  Subcutaneous 3 times daily (with meals) Once daily, can add additional 5 units if BGs are >500mg/dL.  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, with long-term current use of insulin, unspecified laterality (H)      insulin pen needle (NOVOFINE 30) 30G X 8 MM miscellaneous USE 4 DAILY OR AS DIRECTED  Qty: 400 each, Refills: 0    Associated Diagnoses: Type 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, with long-term current use of insulin, unspecified laterality (H)      ipratropium - albuterol 0.5 mg/2.5 mg/3 mL (DUONEB) 0.5-2.5 (3) MG/3ML neb solution Take 1 vial (3 mLs) by nebulization every 6 hours as needed for shortness of breath / dyspnea or wheezing  Qty: 30 vial, Refills: 0    Associated Diagnoses: Wheezing      losartan (COZAAR) 50 MG tablet Take 1 tablet (50 mg) by mouth daily  Qty: 90 tablet, Refills: 3    Comments: Dose decrease  Associated Diagnoses: Coronary artery disease involving native heart with angina pectoris, unspecified vessel or lesion type (H)      melatonin 5 MG CAPS Take 2 capsules by mouth At Bedtime  Qty: 180 capsule, Refills: 3    Comments: Updated instructions  Associated Diagnoses: Insomnia, unspecified type      metoprolol succinate ER (TOPROL-XL) 25 MG 24 hr tablet Take 1 tablet (25 mg) by mouth daily  Qty: 90 tablet, Refills: 1    Associated Diagnoses: Coronary artery disease involving native heart with angina pectoris, unspecified vessel or lesion type (H)      nystatin (MYCOSTATIN) 076071 UNIT/GM external powder Apply topically once as needed for dry  skin  Qty: 60 g, Refills: 3    Associated Diagnoses: Morbid obesity (H); Skin breakdown      !! order for DME Equipment being ordered: Depends.  Qty: 30 each, Refills: 1    Associated Diagnoses: Mixed incontinence      !! order for DME Equipment being ordered: Freestyle [employer] [place]  Qty: 1 Device, Refills: 0    Associated Diagnoses: Type 2 diabetes mellitus with stage 3 chronic kidney disease, with long-term current use of insulin (H)      paliperidone ER (INVEGA) 9 MG 24 hr tablet Take 1 tablet (9 mg) by mouth every morning  Qty: 30 tablet, Refills: 2    Associated Diagnoses: Schizoaffective disorder, bipolar type (H)      ranitidine (ZANTAC) 300 MG tablet TAKE 1 TABLET (300MG) BY MOUTH AT BEDTIME  Qty: 90 tablet, Refills: 1    Associated Diagnoses: Gastroesophageal reflux disease without esophagitis      sertraline (ZOLOFT) 100 MG tablet Take 2 tablets (200 mg) by mouth daily  Qty: 60 tablet, Refills: 3    Associated Diagnoses: Schizoaffective disorder, bipolar type (H)      topiramate (TOPAMAX) 50 MG tablet Take 1.5 tablets (75 mg) by mouth 2 times daily  Qty: 90 tablet, Refills: 3    Associated Diagnoses: Morbid obesity (H)      cholecalciferol (VITAMIN D3) 34270 units (1250 mcg) capsule TAKE 1 CAPSULE (50,000 UNITS) MONTHLY  Qty: 4 capsule, Refills: 3    Associated Diagnoses: Vitamin D deficiency      semaglutide (OZEMPIC) 1 MG/DOSE pen Inject 1 mg Subcutaneous every 7 days  Qty: 6 mL, Refills: 5    Comments: Dose increase  Associated Diagnoses: Type 2 diabetes mellitus with hyperglycemia, with long-term current use of insulin (H)       !! - Potential duplicate medications found. Please discuss with provider.      STOP taking these medications       polyethylene glycol (MIRALAX/GLYCOLAX) powder Comments:   Reason for Stopping:         polyethylene glycol (MIRALAX/GLYCOLAX) powder Comments:   Reason for Stopping:         senna-docusate (SENOKOT-S;PERICOLACE) 8.6-50 MG per tablet Comments:   Reason for Stopping:          SUMAtriptan (IMITREX) 25 MG tablet Comments:   Reason for Stopping:             Allergies   Allergies   Allergen Reactions     Imidazole Antifungals Hives     Tolerates diflucan     Ketoprofen Itching     Pruritis to topical     Lisinopril Hives     Metformin Other (See Comments)     Patient hospitalized for lactic acidosis - admitting provider suspectd caused by metformin     Metronidazole Hives     Posaconazole Hives     Tolerates diflucan     Data   Most Recent 3 CBC's:  Recent Labs   Lab Test 07/31/19  0619 07/30/19  0710 07/29/19  0912   WBC 8.6 13.0* 20.6*   HGB 9.5* 9.6* 10.2*   MCV 98 95 99    177 182      Most Recent 3 BMP's:  Recent Labs   Lab Test 07/31/19  0619 07/31/19  0003 07/30/19  0710 07/29/19  0630     --  141 137   POTASSIUM 4.2  --  4.5 4.5   CHLORIDE 109  --  112* 111*   CO2 25  --  21 21   BUN 13  --  14 24   CR 0.92 0.84 0.93 1.12*   ANIONGAP 6  --  8 5   ALLEN 9.3  --  8.8 8.3*   GLC 92  --  100* 134*     Most Recent 2 LFT's:  Recent Labs   Lab Test 07/28/19  1141 05/29/19  2226   AST 17 24   ALT 23 32   ALKPHOS 97 97   BILITOTAL 0.2 0.1*     Most Recent INR's and Anticoagulation Dosing History:  Anticoagulation Dose History     Recent Dosing and Labs Latest Ref Rng & Units 5/7/2009 6/19/2011 5/29/2014 8/29/2016 11/11/2016    INR 0.86 - 1.14 0.98 0.87 1.02 0.86 Unsatisfactory specimen - tube underfilled  SPOKE WITH DR VARELA [id number] 2254, LY        Most Recent 3 Troponin's:  Recent Labs   Lab Test 07/28/19  1141 05/15/19  1822 03/30/19  1659  01/06/15  0423  05/28/14  2337  05/01/14  1358   TROPI <0.015 <0.015 <0.015   < >  --    < >  --    < >  --    TROPONIN  --   --   --   --  0.01  --  0.00  --  0.01    < > = values in this interval not displayed.     Most Recent Cholesterol Panel:  Recent Labs   Lab Test 08/06/18  1038   CHOL 173   LDL 84   HDL 46*   TRIG 215*     Most Recent 6 Bacteria Isolates From Any Culture (See EPIC Reports for Culture Details):  Recent Labs    Lab Test 07/28/19  1141 05/29/19  2143 10/17/18  1805 10/17/18  1619 10/17/18  1530 08/29/17  1735   CULT No growth after 3 days  No growth after 3 days 10,000 to 50,000 colonies/mL  mixed urogenital timoteo  Susceptibility testing not routinely done   No growth No growth No growth No growth     Most Recent TSH, T4 and A1c Labs:  Recent Labs   Lab Test 05/24/19  0916 05/15/19  1822  05/28/11  0850   TSH 1.62  --    < > 2.35   T4  --   --   --  1.03   A1C  --  7.6*   < >  --     < > = values in this interval not displayed.     Results for orders placed or performed during the hospital encounter of 07/28/19   XR Chest 2 Views    Narrative    CHEST TWO VIEWS  7/28/2019 12:20 PM     HISTORY:  Fever, cough.    COMPARISON: 5/15/2019 radiographs.    FINDINGS: Enlargement of the cardiac silhouette again noted. The  pulmonary vasculature is more prominent. There is some new  peribronchial cuffing. There is also a mild degree of ill-defined  reticulonodular lung disease diffusely, new. No effusions.      Impression    IMPRESSION: The constellation of findings could all be related to  congestive heart failure with interstitial and alveolar edema, but a  diffuse infectious/inflammatory infiltrate could also give this  appearance.    CECY LANGE MD     *Note: Due to a large number of results and/or encounters for the requested time period, some results have not been displayed. A complete set of results can be found in Results Review.           Disclaimer: This note consists of symbols derived from keyboarding, dictation and/or voice recognition software. As a result, there may be errors in the script that have gone undetected. Please consider this when interpreting information found in this chart.     normal...

## 2019-07-31 NOTE — PLAN OF CARE
Please see flowsheets for detailed vital signs and assessments.   Neuro: A&Ox4 forgetful at times  Vital Signs: stable  Pain: denies  O2: mid 90s RA - is now on RA   Tele: SR  Respiratory: LS Diminished  GI: WDL  : voiding w/o difficulty  Skin: WDL  Activity: Stand By Assist  IVF: saline locked  Notable labs: hematocrit, Hgb  Discharge: Possible discharge on Wednesday

## 2019-07-31 NOTE — PROGRESS NOTES
Discharge Planner   Discharge Plans in progress: Pt will discharge back to her  today.   staff will provide transport at 1545.  Sw offered to fax the discharge orders, but  staff, Zheng [phone], said that he will just  the orders when he picks up the pt.     07/31/19 1456   Final Resources   Other Resources Group Home   [facility]   Group Home Contact Info P: [phone]   Group [place] Status Active     Barriers to discharge plan: None  Follow up plan: Sw will continue to be available as needed until discharge.       Entered by: Pam Razo 07/31/2019 2:57 PM

## 2019-07-31 NOTE — PLAN OF CARE
DX :  Sepsis/Pneumonia    HX : BM2, Uterine cancer, schizoaffective disorder.  LABS : Na 141, K 4.5, , 206  TELE:  SR   GI/ : Continent of B&B, sometimes incontinent.  DIET :  Mod carb diet  ASSESS; A&OX4. Vitals stable. 02 @2Lper nc. Held Novolog 25 unit because patient did not eat much supper.  PAIN : Denied pain during this shift.  ACTIVITY : Assist of 1.with walker and gait belt.  TEACHING : Encouraged to use IS.  PLAN FOR DISCHARGE : In 1 to 2 days.

## 2019-08-01 ENCOUNTER — PATIENT OUTREACH (OUTPATIENT)
Dept: CARE COORDINATION | Facility: CLINIC | Age: 59
End: 2019-08-01

## 2019-08-01 ENCOUNTER — TELEPHONE (OUTPATIENT)
Dept: FAMILY MEDICINE | Facility: CLINIC | Age: 59
End: 2019-08-01

## 2019-08-01 DIAGNOSIS — J18.9 PNEUMONIA: Primary | ICD-10-CM

## 2019-08-01 NOTE — TELEPHONE ENCOUNTER
Caregiver called clinic stating they tried bringing Nena to the ED for evaluation of new onset lethargy, ataxia and cognitive decline that started approx. 24 hours ago and Nena is refusing because of her recent hospital discharge on 7/31/19.  Caregiver asked for appointment for Nena tomorrow 8/2/19 with her pcp to be evaluated.  Appointment made for 9 am.  Caregiver agreed with plan and writer  advised that if Nena  changes her mind about being seen in the ED, to go ahead and bring her there for evaluation.    Jolie Stephenson RN  [facility]

## 2019-08-01 NOTE — PROGRESS NOTES
Clinic Care Coordination Contact  [facility]/Voicemail       Clinical Data: Care Coordinator Outreach  Outreach attempted x 1.  Left message on voicemail with call back information and requested return call.  Plan: Care Coordinator will try to reach patient again in 1-2 business days.    Renata Pulido RN  [facility]-Care Coordination  [facility]  [facility]  [phone]

## 2019-08-05 NOTE — PROGRESS NOTES
Clinic Care Coordination Contact    Clinic Care Coordination Contact  OUTREACH    Referral Information:     Hospital follow up       Chief Complaint   Patient presents with     Clinic Care Coordination - Post Hospital     RN        [place] Utilization:      Utilization    Last refreshed: 8/5/2019 12:06 PM:  Hospital Admissions 3           Last refreshed: 8/5/2019 12:06 PM:  ED Visits 2           Last refreshed: 8/5/2019 12:06 PM:  No Show Count (past year) 6              Current as of: 8/5/2019 12:06 PM              Clinical Concerns:  Current Medical Concerns:  Sepsis related to pneumonia    Current Behavioral Concerns: baseline    Education Provided to patient: none      Health Maintenance Reviewed:    Clinical Pathway: None    Medication Management:  Managed by group home     Functional Status:   independent with device    Living Situation:     Lives in group home  Diet/Exercise/Sleep:   regular    Transportation:      group home staff     Psychosocial:      stable  Financial/Insurance:      Not discussed     Resources and Interventions:  Current Resources:    ;   Community Resources: Group Home                     Goals:   Goals        General    Medical (pt-stated)     Notes - Note created  7/7/2016  9:15 AM by Chelsea Pulido RN    Get blood sugars under control    Improve medication compliance    As of today's date 7/7/2016 goal is met at 0 - 25%.   Goal Status:  Active                Patient/Caregiver understanding: good       Future Appointments              Tomorrow Richardson Lopez, LICSW [facility], [facility]    Tomorrow Rowena Haas APRN CNP [facility], [facility]    Tomorrow Eugenia De Jesus, [facility] MTM, DAMIAN    In 1 month Tiburcio Chacon MD [facility] MSA CLIN    In 1 month Reginald Brothers MD [facility], [facility]    In 2 months Marcellus  BRANDON Waddell [facility] Medical Weight Management, [facility]          Plan: see previous triage note. Patient noted to be somnolent and confused (didn't know what year it was). Follow up was to happen on Friday but patient completely cleared and is back to baseline. They wonder if it was because she was on oxygen in the hospital and it was discontinued just when she left and she was not discharged with oxygen. Has follow up appointment tomorrow with PCP. No current concerns for patient per group home staff, Zheng Pulido RN  [facility]-Care Coordination  [facility]  [facility]  [phone]

## 2019-08-05 NOTE — TELEPHONE ENCOUNTER
Please see care coordination note for further follow up.     Renata Pulido, DAVIN  [facility]-Care Coordination  [facility]  [facility]  [phone]

## 2019-08-06 ENCOUNTER — OFFICE VISIT (OUTPATIENT)
Dept: BEHAVIORAL HEALTH | Facility: CLINIC | Age: 59
End: 2019-08-06
Payer: MEDICARE

## 2019-08-06 ENCOUNTER — OFFICE VISIT (OUTPATIENT)
Dept: PHARMACY | Facility: CLINIC | Age: 59
End: 2019-08-06
Payer: COMMERCIAL

## 2019-08-06 ENCOUNTER — NURSE TRIAGE (OUTPATIENT)
Dept: NURSING | Facility: CLINIC | Age: 59
End: 2019-08-06

## 2019-08-06 ENCOUNTER — OFFICE VISIT (OUTPATIENT)
Dept: FAMILY MEDICINE | Facility: CLINIC | Age: 59
End: 2019-08-06
Payer: MEDICARE

## 2019-08-06 VITALS
WEIGHT: 234 LBS | SYSTOLIC BLOOD PRESSURE: 114 MMHG | HEART RATE: 91 BPM | DIASTOLIC BLOOD PRESSURE: 72 MMHG | RESPIRATION RATE: 16 BRPM | TEMPERATURE: 97.7 F | BODY MASS INDEX: 45.7 KG/M2 | OXYGEN SATURATION: 96 %

## 2019-08-06 DIAGNOSIS — Z79.4 TYPE 2 DIABETES MELLITUS WITH MILD NONPROLIFERATIVE RETINOPATHY WITHOUT MACULAR EDEMA, WITH LONG-TERM CURRENT USE OF INSULIN, UNSPECIFIED LATERALITY (H): Primary | ICD-10-CM

## 2019-08-06 DIAGNOSIS — G47.33 OSA (OBSTRUCTIVE SLEEP APNEA): ICD-10-CM

## 2019-08-06 DIAGNOSIS — E78.5 HYPERLIPIDEMIA LDL GOAL <100: ICD-10-CM

## 2019-08-06 DIAGNOSIS — R21 RASH AND NONSPECIFIC SKIN ERUPTION: ICD-10-CM

## 2019-08-06 DIAGNOSIS — G89.29 CHRONIC PAIN OF BOTH SHOULDERS: ICD-10-CM

## 2019-08-06 DIAGNOSIS — Z13.820 SCREENING FOR OSTEOPOROSIS: ICD-10-CM

## 2019-08-06 DIAGNOSIS — M25.511 CHRONIC PAIN OF BOTH SHOULDERS: ICD-10-CM

## 2019-08-06 DIAGNOSIS — M25.512 CHRONIC PAIN OF BOTH SHOULDERS: ICD-10-CM

## 2019-08-06 DIAGNOSIS — E11.65 TYPE 2 DIABETES MELLITUS WITH HYPERGLYCEMIA, WITH LONG-TERM CURRENT USE OF INSULIN (H): Primary | ICD-10-CM

## 2019-08-06 DIAGNOSIS — F25.1 SCHIZOAFFECTIVE DISORDER, DEPRESSIVE TYPE (H): ICD-10-CM

## 2019-08-06 DIAGNOSIS — Z79.52 LONG TERM CURRENT USE OF SYSTEMIC STEROIDS: ICD-10-CM

## 2019-08-06 DIAGNOSIS — E11.3299 TYPE 2 DIABETES MELLITUS WITH MILD NONPROLIFERATIVE RETINOPATHY WITHOUT MACULAR EDEMA, WITH LONG-TERM CURRENT USE OF INSULIN, UNSPECIFIED LATERALITY (H): Primary | ICD-10-CM

## 2019-08-06 DIAGNOSIS — F43.10 POSTTRAUMATIC STRESS DISORDER: ICD-10-CM

## 2019-08-06 DIAGNOSIS — Z79.4 TYPE 2 DIABETES MELLITUS WITH HYPERGLYCEMIA, WITH LONG-TERM CURRENT USE OF INSULIN (H): Primary | ICD-10-CM

## 2019-08-06 DIAGNOSIS — F25.1 SCHIZOAFFECTIVE DISORDER, DEPRESSIVE TYPE (H): Primary | ICD-10-CM

## 2019-08-06 DIAGNOSIS — K59.00 CONSTIPATION, UNSPECIFIED CONSTIPATION TYPE: ICD-10-CM

## 2019-08-06 DIAGNOSIS — Z13.220 SCREENING FOR HYPERLIPIDEMIA: ICD-10-CM

## 2019-08-06 LAB
CHOLEST SERPL-MCNC: 146 MG/DL
HBA1C MFR BLD: 6.6 % (ref 0–5.6)
HDLC SERPL-MCNC: 39 MG/DL
LDLC SERPL CALC-MCNC: 81 MG/DL
NONHDLC SERPL-MCNC: 107 MG/DL
TRIGL SERPL-MCNC: 131 MG/DL

## 2019-08-06 PROCEDURE — 99607 MTMS BY PHARM ADDL 15 MIN: CPT | Performed by: PHARMACIST

## 2019-08-06 PROCEDURE — 80061 LIPID PANEL: CPT | Performed by: NURSE PRACTITIONER

## 2019-08-06 PROCEDURE — 90832 PSYTX W PT 30 MINUTES: CPT | Performed by: SOCIAL WORKER

## 2019-08-06 PROCEDURE — 36415 COLL VENOUS BLD VENIPUNCTURE: CPT | Performed by: NURSE PRACTITIONER

## 2019-08-06 PROCEDURE — 99606 MTMS BY PHARM EST 15 MIN: CPT | Performed by: PHARMACIST

## 2019-08-06 PROCEDURE — 99496 TRANSJ CARE MGMT HIGH F2F 7D: CPT | Performed by: NURSE PRACTITIONER

## 2019-08-06 PROCEDURE — 83036 HEMOGLOBIN GLYCOSYLATED A1C: CPT | Performed by: NURSE PRACTITIONER

## 2019-08-06 RX ORDER — NYSTATIN 100000 U/G
CREAM TOPICAL 2 TIMES DAILY
Qty: 30 G | Refills: 3 | Status: ON HOLD | OUTPATIENT
Start: 2019-08-06 | End: 2019-12-21

## 2019-08-06 RX ORDER — DIPHENHYDRAMINE HCL 25 MG
25 TABLET ORAL EVERY 12 HOURS PRN
Qty: 90 TABLET | Refills: 0 | Status: SHIPPED | OUTPATIENT
Start: 2019-08-06 | End: 2019-08-08

## 2019-08-06 RX ORDER — POLYETHYLENE GLYCOL 3350 17 G/17G
1 POWDER, FOR SOLUTION ORAL DAILY PRN
Qty: 500 G | Refills: 3 | Status: ON HOLD | OUTPATIENT
Start: 2019-08-06 | End: 2019-12-21

## 2019-08-06 NOTE — PATIENT INSTRUCTIONS
[facility]  [phone] [address]    -Restarted Miralax daily.  -Start taking Benadryl 25 mg in the morning and at bedtime to help with the itching and the rash.   -Nystatin cream to the areas we discussed at bedtime and use the powder during the day.   -Will need a bath or shower every day or every other day to help with the healing of the skin folds.   -Keep areas under breast clean and dry.

## 2019-08-06 NOTE — PROGRESS NOTES
SUBJECTIVE:                                                    Nena Tang is a 59 year old female who presents to clinic today for the following health issues:  [employer]: Don     Medical Supplies: Patient reports that she needs new parts for her CPAP because of not being used for over a year. Patient reports that the parts are beyond cleaning.       Skin/Derm Problem  -Tailbone, irritation, blistering, x 5 days  -skin blotches on chest and under arm, with some irritation      Diabetes Follow-up  Patient will have her blood sugar numbers faxed to the clinic; her number form her meter show: Fasting 110-140: Lowest 73. Highest 244    How often are you checking your blood sugar? Four or more times daily    What time of day are you checking your blood sugars (select all that apply)?  Before meals    Have you had any blood sugars above 200?  Yes a few    Have you had any blood sugars below 70?  No    What symptoms do you notice when your blood sugar is low?  None    What concerns do you have today about your diabetes? Other:      Do you have any of these symptoms? (Select all that apply)  Numbness in feet     Have you had a diabetic eye exam in the last 12 months? No    BP Readings from Last 2 Encounters:   08/06/19 114/72   07/31/19 (!) 146/66     Hemoglobin A1C (%)   Date Value   05/15/2019 7.6 (H)   12/20/2018 6.4 (H)     LDL Cholesterol Calculated (mg/dL)   Date Value   08/06/2018 84   06/27/2017 64       Diabetes Management Resources        Hospital Follow-up Visit:  Hospital/Nursing Home/IP Rehab Facility: [facility]  Date of Admission: 7/28/19  Date of Discharge: 7/31/19  Reason(s) for Admission: sepsis secondary to pneumonia            Problems taking medications regularly:  None       Medication changes since discharge: discharge Polyethylene Glycol       Problems adhering to non-medication therapy:  Difficulty doing breathing exercises    Summary of hospitalization:  [facility] discharge  summary reviewed  Diagnostic Tests/Treatments reviewed.  Follow up needed: Blood glucose  Other Healthcare Providers Involved in Patient s Care:         None  Update since discharge: improved.     Post Discharge Medication Reconciliation: discharge medications reconciled, continue medications without change.  Plan of care communicated with patient and caregiver     Coding guidelines for this visit:  Type of Medical   Decision Making Face-to-Face Visit       within 7 Days of discharge Face-to-Face Visit        within 14 days of discharge   Moderate Complexity [id number]   High Complexity [id number]              Mental Health Follow up   Patient reports that she found a fishing spot and would like to let go of her 's ashes. Patient states that she would like to move on with her life and be happy and maybe have a relationship. Patient continues to [facility] to the [facility].     Status since last visit: Feels like she is doing ok, not too bad. Reports some sadness.     See PHQ-9 for current symptoms.  Other associated symptoms: None    Complicating factors: tiredness  Significant life event:  No   Current substance abuse:  None  Anxiety or Panic symptoms:  No    Sleep - good, able to fall and stay asleep. Patient will try and get supplies from the medical supply store.   Appetite - low, asking for low portions which she states is good for her.   Exercise - trying to walk more.     Denies any substance use.     Caffeine - soda.     PHQ-9  English PHQ-9   Any Language               Problems taking medications regularly: No    Medication side effects: none    Diet: regular (no restrictions)    Social History     Tobacco Use     Smoking status: Never Smoker     Smokeless tobacco: Never Used   Substance Use Topics     Alcohol use: No     Frequency: Never     Comment: last month        Problem list and histories reviewed & adjusted, as indicated.  Additional history: as documented    Patient Active Problem List    Diagnosis     Osteopenia     Vitamin B12 deficiency without anemia     Hyperlipidemia LDL goal <100     Rotator cuff syndrome     Type 2 diabetes mellitus with mild nonproliferative retinopathy (H)     Illiterate     Irritable bowel syndrome     overweight - BMI >35     Takotsubo cardiomyopathy     CAD (coronary artery disease)     Restless legs syndrome (RLS)     CINDY (obstructive sleep apnea)- mild AHI 10.3     Verbal auditory hallucination     Chronic low back pain     Schizoaffective disorder, depressive type (H)     Migraine headache     HTN, goal below 140/90     Health Care Home     Lumbago     Cervicalgia     Cocaine abuse, episodic (H)     Suicidal ideation     Esophageal reflux     Mild nonproliferative diabetic retinopathy (H)     Tardive dyskinesia     Alcohol use     Left cataract     Falls frequently     History of uterine cancer     Psychophysiological insomnia     Dysuria     Asymptomatic postmenopausal status     Abdominal pain, right lower quadrant     Infectious encephalopathy     Non-intractable vomiting with nausea     Thoughts of self harm     Gastroenteritis     Posttraumatic stress disorder     Cervical cancer screening     Type 2 diabetes mellitus with stage 3 chronic kidney disease, with long-term current use of insulin (H)     Positive AIDA (antinuclear antibody)     Hyperglycemia     Pneumonia     Past Surgical History:   Procedure Laterality Date     C OOPHORECTORMY FOR MALIG, W/BX  1983    UTERINE     CATARACT IOL, RT/LT Bilateral 2017     COLONOSCOPY N/A 3/16/2017    Procedure: COLONOSCOPY;  Surgeon: Traci Gonzalez MD;  Location: [facility]     Coronary CTA  5/21/2014     HYSTERECTOMY  1983    uterine cancer yearly pap's per provider.     LAPAROSCOPIC CHOLECYSTECTOMY  2008     PHACOEMULSIFICATION CLEAR CORNEA WITH STANDARD INTRAOCULAR LENS IMPLANT Left 5/5/2017    Procedure: PHACOEMULSIFICATION CLEAR CORNEA WITH STANDARD INTRAOCULAR LENS IMPLANT;  LEFT EYE PHACOEMULSIFICATION  CLEAR CORNEA WITH STANDARD INTRAOCULAR LENS IMPLANT ;  Surgeon: Tyra Diaz MD;  Location:  [facility]     PHACOEMULSIFICATION CLEAR CORNEA WITH STANDARD INTRAOCULAR LENS IMPLANT Right 2017    Procedure: PHACOEMULSIFICATION CLEAR CORNEA WITH STANDARD INTRAOCULAR LENS IMPLANT;  RIGHT EYE PHACOEMULSIFICATION CLEAR CORNEA WITH STANDARD INTRAOCULAR LENS IMPLANT;  Surgeon: Tyra Diaz MD;  Location:  [facility]     RELEASE TRIGGER FINGER  10/11/2012    Left thumb. Procedure: RELEASE TRIGGER FINGER;  LEFT THUMB TRIGGER RELEASE;  Surgeon: Tay Langley MD;  Location:  [facility]     RELEASE TRIGGER FINGER Right 2016    Procedure: RELEASE TRIGGER FINGER;  Surgeon: Albino Castañeda MD;  Location: [facility]       Social History     Tobacco Use     Smoking status: Never Smoker     Smokeless tobacco: Never Used   Substance Use Topics     Alcohol use: No     Frequency: Never     Comment: last month     Family History   Problem Relation Age of Onset     Cancer Mother         BLADDER     Respiratory Mother         COPD     Gastrointestinal Disease Mother         CIRRHOSIS OF LI BOLIVAR     Alcohol/Drug Mother      Diabetes Mother      Hypertension Mother      Lipids Mother      C.A.D. Mother      Glaucoma Mother      Alcohol/Drug Sister      Mental Illness Sister      Alcohol/Drug Sister      Psychotic Disorder Sister      Cancer Maternal Grandmother         UNKNOWN TYPE     Cancer Brother         COLON     Cancer - colorectal Brother         IN HIS LATE 30S     Alcohol/Drug Brother          OF HEROIN OVERDOSE AT AGE 22 YRS     Macular Degeneration No family hx of            Current Outpatient Medications   Medication Sig Dispense Refill     acetaminophen (TYLENOL) 500 MG tablet Take 2 tablets (1,000 mg) by mouth 3 times daily as needed for mild pain 100 tablet 3     aspirin (ASA) 81 MG EC tablet TAKE 1 TABLET (81MG) BY MOUTH DAILY 30 tablet 3     atorvastatin (LIPITOR) 80 MG tablet TAKE 1 TABLET (80MG) BY MOUTH DAILY 30  tablet 11     benztropine (COGENTIN) 0.5 MG tablet Take 2 tablets (1 mg) by mouth 2 times daily 120 tablet 3     blood glucose (NO BRAND SPECIFIED) test strip Use to test blood sugar  4 times daily or as directed. To accompany: Blood Glucose Monitor Brands: per insurance. 100 strip 11     calcium carbonate (OS-ALLEN) 1500 (600 Ca) MG tablet Take 3 tablets (1,800 mg) by mouth daily 180 tablet 3     cholecalciferol (VITAMIN D3) 05849 units (1250 mcg) capsule TAKE 1 CAPSULE (50,000 UNITS) MONTHLY 4 capsule 3     clonazePAM (KLONOPIN) 0.5 MG tablet Take 1 tablet (0.5 mg) by mouth At Bedtime 30 tablet 0     gabapentin (NEURONTIN) 300 MG capsule Take 3 capsules (900 mg) by mouth 3 times daily 270 capsule 1     guaiFENesin (ROBITUSSIN) 100 MG/5ML liquid Take 10 mLs (200 mg) by mouth nightly as needed for cough 118 mL 0     guaiFENesin (ROBITUSSIN) 100 MG/5ML SYRP Take 10 mLs by mouth nightly as needed for cough       haloperidol (HALDOL) 10 MG tablet Take 5 mg by mouth 2 times daily as needed for agitation       ibuprofen (ADVIL/MOTRIN) 200 MG tablet Take 200 mg by mouth every 8 hours as needed for mild pain       insulin aspart (NOVOLOG FLEXPEN) 100 UNIT/ML pen Inject 25 Units Subcutaneous 3 times daily (with meals) Once daily, can add additional 5 units if BGs are >500mg/dL. 15 mL 11     insulin glargine (LANTUS SOLOSTAR PEN) 100 UNIT/ML pen Inject 60 Units Subcutaneous At Bedtime 15 mL 11     insulin pen needle (NOVOFINE 30) 30G X 8 MM miscellaneous USE 4 DAILY OR AS DIRECTED 400 each 0     ipratropium - albuterol 0.5 mg/2.5 mg/3 mL (DUONEB) 0.5-2.5 (3) MG/3ML neb solution Take 1 vial (3 mLs) by nebulization every 6 hours as needed for shortness of breath / dyspnea or wheezing 30 vial 0     lidocaine (LIDODERM) 5 % patch Place 1 patch onto the skin every 24 hours 10 patch 0     losartan (COZAAR) 50 MG tablet Take 1 tablet (50 mg) by mouth daily 90 tablet 3     melatonin 5 MG CAPS Take 2 capsules by mouth At Bedtime 180  capsule 3     metoprolol succinate ER (TOPROL-XL) 25 MG 24 hr tablet Take 1 tablet (25 mg) by mouth daily 90 tablet 1     nystatin (MYCOSTATIN) 718140 UNIT/GM external powder Apply topically once as needed for dry skin 60 g 3     order for DME Equipment being ordered: Depends. 30 each 1     order for DME Equipment being ordered: Freestyle Gabby [place] 1 Device 0     paliperidone ER (INVEGA) 9 MG 24 hr tablet Take 1 tablet (9 mg) by mouth every morning 30 tablet 2     ranitidine (ZANTAC) 300 MG tablet TAKE 1 TABLET (300MG) BY MOUTH AT BEDTIME 90 tablet 1     semaglutide (OZEMPIC) 1 MG/DOSE pen Inject 1 mg Subcutaneous every 7 days 6 mL 5     sertraline (ZOLOFT) 100 MG tablet Take 2 tablets (200 mg) by mouth daily 60 tablet 3     topiramate (TOPAMAX) 50 MG tablet Take 1.5 tablets (75 mg) by mouth 2 times daily 90 tablet 3     Allergies   Allergen Reactions     Imidazole Antifungals Hives     Tolerates diflucan     Ketoprofen Itching     Pruritis to topical     Lisinopril Hives     Metformin Other (See Comments)     Patient hospitalized for lactic acidosis - admitting provider suspectd caused by metformin     Metronidazole Hives     Posaconazole Hives     Tolerates diflucan     Recent Labs   Lab Test 07/31/19  0619 07/31/19  0003 07/30/19  0710  07/28/19  1141 05/29/19  2226 05/24/19  0916  05/15/19  1822  12/20/18  1532  08/06/18  1038  06/27/17  1358  07/13/16  1350  07/14/15  1215   A1C  --   --   --   --   --   --   --   --  7.6*  --  6.4*  --  6.4*   < > 7.0*   < >  --    < > 9.1*   LDL  --   --   --   --   --   --   --   --   --   --   --   --  84  --  64  --  137*  --  78   HDL  --   --   --   --   --   --   --   --   --   --   --   --  46*  --  37*  --   --   --  39*   TRIG  --   --   --   --   --   --   --   --   --   --   --   --  215*  --  267*  --   --   --  151*   ALT  --   --   --   --  23 32 30  --  33   < >  --    < >  --    < > 32   < >  --    < >  --    CR 0.92 0.84 0.93   < > 1.13* 0.89 0.87   < >  0.93   < > 0.99   < >  --    < > 0.78   < >  --    < > 0.67   GFRESTIMATED 68 76 68   < > 53* 71 73   < > 67   < > 63   < >  --    < > 76   < >  --    < > >90  Non  GFR Calc     GFRESTBLACK 79 88 79   < > 62 82 85   < > 78   < > 73   < >  --    < > >90   GFR Calc     < >  --    < > >90   GFR Calc     POTASSIUM 4.2  --  4.5   < > 4.1 4.0 4.0   < > 4.3   < > 4.2   < >  --    < > 4.3   < >  --    < > 4.3   TSH  --   --   --   --   --   --  1.62  --   --   --  1.98  --   --    < >  --    < >  --    < >  --     < > = values in this interval not displayed.      BP Readings from Last 3 Encounters:   07/31/19 (!) 146/66   07/22/19 137/70   07/02/19 132/70    Wt Readings from Last 3 Encounters:   07/31/19 104.1 kg (229 lb 9.6 oz)   07/22/19 107.3 kg (236 lb 9.6 oz)   07/02/19 106.8 kg (235 lb 8 oz)        Labs reviewed in EPIC  Problem list, Medication list, Allergies, and Medical/Social/Surgical histories reviewed in [facility] and updated as appropriate.     ROS: Constitutional, neuro, ENT, endocrine, pulmonary, cardiac, gastrointestinal, genitourinary, musculoskeletal, integument and psychiatric systems are negative, except as otherwise noted above in the HPI.   OBJECTIVE:                                                    /72   Pulse 91   Temp 97.7  F (36.5  C)   Resp 16   Wt 106.1 kg (234 lb)   LMP 01/06/2015   SpO2 96%   BMI 45.70 kg/m    Body mass index is 45.7 kg/m .    GENERAL: healthy, alert and no distress  RESP: lungs clear to auscultation - no rales, rhonchi or wheezes  CV: regular rate and rhythm, normal S1 S2, no S3 or S4, no murmur, click or rub, no peripheral edema and peripheral pulses strong  MS: no gross musculoskeletal defects noted, no edema  SKIN: blisters to coccyx area, generalized rash and erythema under bilateral breasts, mid upper back, and  under panus   NEURO: Normal strength and tone, mentation intact and speech normal    Mental Status  Assessment:  Appearance:   Appropriate   Eye Contact:   Good   Psychomotor Behavior: Normal   Attitude:   Cooperative   Orientation:   All  Speech   Rate / Production: Normal    Volume:  Normal   Mood:    Normal Sad   Affect:    Appropriate   Thought Content:  Clear   Thought Form:  Coherent  Logical   Insight:    Fair   Attention Span and Concentration: appropriate  Recent and Remote Memory:  intact  Fund of Knowledge: appropriate  Muscle Strength and Tone: normal   Suicidal Ideation: reports no thoughts, no intention    See [facility] notes     Diagnostic Test Results:  Results for orders placed or performed in visit on 08/06/19   Lipid panel reflex to direct LDL Non-fasting   Result Value Ref Range    Cholesterol 146 <200 mg/dL    Triglycerides 131 <150 mg/dL    HDL Cholesterol 39 (L) >49 mg/dL    LDL Cholesterol Calculated 81 <100 mg/dL    Non HDL Cholesterol 107 <130 mg/dL   HEMOGLOBIN A1C   Result Value Ref Range    Hemoglobin A1C 6.6 (H) 0 - 5.6 %     *Note: Due to a large number of results and/or encounters for the requested time period, some results have not been displayed. A complete set of results can be found in Results Review.        ASSESSMENT/PLAN:                                                    (E11.3299,  Z79.4) Type 2 diabetes mellitus with mild nonproliferative retinopathy without macular edema, with long-term current use of insulin, unspecified laterality (H)  (primary encounter diagnosis)  Comment: Improving. See HPI.   Lab Results   Component Value Date    A1C 6.6 08/06/2019    A1C 7.6 05/15/2019    A1C 6.4 12/20/2018    A1C 6.4 08/06/2018    A1C 6.2 05/22/2018     Plan: HEMOGLOBIN A1C        -Continue with frequent blood sugar checks.   -Continue with healthy food choices and staying more active.     (R21) Rash and nonspecific skin eruption  Comment: blisters to coccyx area- probably from previous hospitalization- start of a pressure ulcer, generalized rash and erythema under bilateral breasts,  mid upper back, and  under panus- seems like a medication reaction most likely form her vancomycin.   Plan: nystatin (MYCOSTATIN) 866356 UNIT/GM external         cream, diphenhydrAMINE (BENADRYL)        25 MG tablet        Start taking benadryl twice per day to help with the rash and itching. Use nystatin powder or cream to the madelaine area.     (Z13.220) Screening for hyperlipidemia  (E78.5) Hyperlipidemia LDL goal <100  Comment: Routine screening.   Plan: Lipid panel reflex to direct LDL Non-fasting            (G47.33) CINDY (obstructive sleep apnea)- mild AHI 10.3  Comment: Patient reports that her sleep has improved since she was started on clonazepam and feels like she might be able to wear her CPAP now. Will reorder CPAP supplies as requested.   Plan: order for DME            (Z13.820) Screening for osteoporosis  (Z79.52) Long term current use of systemic steroids   Comment: Routine screening.   Plan: DEXA HIP/PELVIS/SPINE - Future            (K59.00) Constipation, unspecified constipation type  Comment: Patient requesting medication to be re-ordered to be used as needed.   Plan: polyethylene glycol (MIRALAX) powder        Medication refilled.       Patient Instructions   [facility]  [phone] [address]    -Restarted Miralax daily.  -Start taking Benadryl 25 mg in the morning and at bedtime to help with the itching and the rash.   -Nystatin cream to the areas we discussed at bedtime and use the powder during the day.   -Will need a bath or shower every day or every other day to help with the healing of the skin folds.   -Keep areas under breast clean and dry.     I spent Greater than 40 minutes was spent face to face with Nena Tang, with greater than 50 % in counselling and consultation about as Diabetes, hospital follow-up, screening, sleep apnea and constipation.     [facility] PRIMARY [facility]

## 2019-08-06 NOTE — TELEPHONE ENCOUNTER
"    Reason for Disposition    Caller has URGENT medication question about med that PCP prescribed and triager unable to answer question    Additional Information    Negative: Drug overdose and nurse unable to answer question    Negative: Caller requesting information not related to medicine    Negative: Caller requesting a prescription for Strep throat and has a positive culture result    Negative: Rash while taking a medication or within 3 days of stopping it    Negative: Immunization reaction suspected    Negative: [1] Asthma and [2] having symptoms of asthma (cough, wheezing, etc)    Negative: MORE THAN A DOUBLE DOSE of a prescription or over-the-counter (OTC) drug    Negative: [1] DOUBLE DOSE (an extra dose or lesser amount) of over-the-counter (OTC) drug AND [2] any symptoms (e.g., dizziness, nausea, pain, sleepiness)    Negative: [1] DOUBLE DOSE (an extra dose or lesser amount) of prescription drug AND [2] any symptoms (e.g., dizziness, nausea, pain, sleepiness)    Negative: Took another person's prescription drug    Negative: [1] DOUBLE DOSE (an extra dose or lesser amount) of prescription drug AND [2] NO symptoms (Exception: a double dose of antibiotics)    Negative: Diabetes drug error or overdose (e.g., insulin or extra dose)    Negative: [1] Request for URGENT new prescription or refill of \"essential\" medication (i.e., likelihood of harm to patient if not taken) AND [2] triager unable to fill per unit policy    Negative: [1] Prescription not at pharmacy AND [2] was prescribed today by PCP    Negative: Pharmacy calling with prescription questions and triager unable to answer question    Protocols used: MEDICATION QUESTION CALL-A-    Call from [facility], staff member at the patient's group home.  Per [facility], the patient was prescribed benadryl 25 mg by mouth at bedtime to help treat a rash caused by recently taken Vancomycin.  The patient had been non-compliant with her c-pap over the past 2 years, and the staff " attempted to order her a new one today.  However, the company would not deliver a new one, stating that the patient would need to be reassessed by a sleep doctor due to noncompliance.  Group home staff now questions if it will be safe to administer the benadryl with the patient's nighttime clonazepam.   called Dr. Antionette Campo at 1814 and left a brief message for her to call [facility] back at [phone].  Dr. Thron called back and stated that the patient could take all of the medications together or, if she did not feel comfortable, take the clonazepam now and the benadryl at bedtime.   called the group home staff back at 1824 to update them on the doctor's instructions.   was placed on speaker phone to relay the instructions to the patient.  At this time the patient elected to take all of her nighttime medications and the benadryl together.    Agatha Dennis RN  [facility]

## 2019-08-06 NOTE — PROGRESS NOTES
SUBJECTIVE/OBJECTIVE:                Nena Tang is a 59 year old female coming in for a transitions of care visit.  She was discharged from [facility] on 19 for sepsis 2/2 pneumonia.     Chief Complaint: wondering why she keeps getting pneumonia.    Tobacco: No tobacco use  Alcohol: not currently using    Medication Adherence/Access: no issues reported  Adult foster home staff sets up and administers medications, except when pt leaves home      Sleep apnea: tried getting out CPAP but mask and tubing was very dirty and unable to use. Unsure how to get new tubing.     Diabetes: Pt currently taking lantus 60u at bedtime, novolog 25u usually 2-3 times a day with meals, and semaglutide 1mg weekly. Pt is not experiencing side effects.    Fior at [facility] home noticed she had some insulin leaking out after injection so pt has been holding the pen longer after injection to reduce leakage.  SMB times daily.  Still very interested in CGM. Ranges (from patient's group home glucose log):   Date FBG/ 2hours post Lunch/2hours post Dinner /2hours post    8/6 131      8/5 101/143 197 244/95     118 96 102/157     135 73,86/122 179/194     131 133 141/92     146 205 155/88       264/101      -DexCom Rx was sent in to  mail order; pt reports that she has not heard anything back.   Patient is not experiencing hypoglycemia. She did have some shakiness with BS 70s.   Recent symptoms of high blood sugar? none  Eye exam: up to date  Foot exam: up to date  ACEi/ARB: No.   Urine Albumin:         Lab Results   Component Value Date     UMALCR 4.47 2019      Aspirin: Taking 81mg daily and denies side effects  Diet/Exercise: did not discuss today        Lab Results   Component Value Date     A1C 7.6 05/15/2019     A1C 6.4 2018     A1C 6.4 2018     A1C 6.2 2018     A1C 6.8 2018     Shoulder pain: pain has resolved with injections, has PT scheduled tomorrow. Hasn't needed to take any PRN pain  medications. Continues to take gabapentin 900mg TID without problems.     Schizoaffective: Currently taking sertraline 200mg daily, Invega 9mg daily, benztropine 1mg BID. Seen by psychiatry recently and added clonazepam 0.5mg HS.   -Reports mood is improved. Future-oriented. Sleep has improved with clonazepam, no longer having visual hallucinations.   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      Today's Vitals: LMP 01/06/2015    BP Readings from Last 1 Encounters:   08/06/19 114/72     Pulse Readings from Last 1 Encounters:   08/06/19 91     Wt Readings from Last 1 Encounters:   08/06/19 234 lb (106.1 kg)     Ht Readings from Last 1 Encounters:   07/28/19 5' (1.524 m)     Estimated body mass index is 45.7 kg/m  as calculated from the following:    Height as of 7/28/19: 5' (1.524 m).    Weight as of an earlier encounter on 8/6/19: 234 lb (106.1 kg).    Temp Readings from Last 1 Encounters:   08/06/19 97.7  F (36.5  C)        ASSESSMENT:                 Current medications were reviewed today.      Medication Adherence: excellent, no issues identified    Sleep apnea: needs improvement, tubing ordered by PCP    Diabetes: improved. A1c not at goal <7%. Recheck A1c ordered by PCP today. Fasting BS have improved to goal  and no longer having frequent hyperglycemia. Will continue with current regimen.   Email sent to  specialty billing team for assistance with obtaining CGM.    Shoulder pain: improved. She will follow-up for physical therapy as planned.    schizoaffective: improved with clonazepam. Continue to monitor.       PLAN:                  Post Discharge Medication Reconciliation Status: discharge medications reconciled, continue medications without change.    Sent email to follow-up on CGM insurance coverage    I spent 30 minutes with this patient today. All changes were made via collaborative practice agreement with Rowena Haas A copy of the visit note was provided to the patient's primary care  provider.    Will follow up in 1 month.    The patient was given a summary of these recommendations as an after visit summary.    Eugenia De Jesus, PharmD, BCACP

## 2019-08-06 NOTE — PROGRESS NOTES
[facility]   August 6, 2019    Behavioral Health Clinician Progress Note    Patient Name: Nena Tang           Service Type:  Individual      Service Location:   Face to Face in Clinic     Session Start Time: 9:30am  Session End Time: 9:52am      Session Length: 16 - 37      Attendees: Patient    Visit Activities (Refresh list every visit): [facility] [employer]     Diagnostic Assessment Date: 1-23-18    Treatment Plan Review Date: 9-4-2019  See Flowsheets for today's PHQ-9 and TAMIA-7 results  Previous PHQ-9:   PHQ-9 SCORE 3/13/2018 10/26/2018 5/7/2019   PHQ-9 Total Score - - -   PHQ-9 Total Score - - -   PHQ-9 Total Score 14 20 22     Previous TAMIA-7:   TAMIA-7 SCORE 2/3/2017 3/13/2018 10/26/2018   Total Score - - -   Total Score 0 17 19   Total Score - - -       ALEXANDRA LEVEL:  ALEXANDRA Score (Last Two) 8/30/2011 6/9/2014   ALEXANDRA Raw Score 42 37   Activation Score 66 49.9   ALEXANDRA Level 3 2       DATA  Extended Session (60+ minutes): No  Interactive Complexity: No  Crisis: No  [facility] Patient: No    Treatment Objective(s) Addressed in This Session:  Target Behavior(s): disease management/lifestyle changes mental health    Depressed Mood: Increase interest, engagement, and pleasure in doing things  Decrease frequency and intensity of feeling down, depressed, hopeless  Improve quantity and quality of night time sleep / decrease daytime naps  Identify negative self-talk and behaviors: challenge core beliefs, myths, and actions  Improve concentration, focus, and mindfulness in daily activities   Feel less fidgety, restless or slow in daily activities / interpersonal interactions  Anxiety: will experience a reduction in anxiety, will develop more effective coping skills to manage anxiety symptoms and will develop healthy cognitive patterns and beliefs  Thought Disturbance: will develop skills to more effectively manage symptoms, will develop more effective support systems and will continue to take medications as  prescribed    Current Stressors / Issues:    The patient was seen by [facility] per the request of the PCP-the patient talked her need to use the CPAP machine as she was admitted to the hospital due to breathing issues and some other symptoms-she stated that she was told that she should take use the CPAP machine to maintain her health-she stated that she will use the machine immediately once she gets the new parts for the machine-regarding sleeping she reported having good sleeping-appetite has been lower (this is good for me)-she stated that she has been having good lower blood sugar readings (checking about 4 times a day)-regarding mood the patient reported having okay mood not too bad-no irritability she has been sad a little bit and worried a little bit (we talked about being grateful and allowing time to look at the things she has to be grateful for)-she is struggling to feeling ronal a lot of the time-a little bit of worry-I am not worried a bout the man I need to use my CPAP machine-she is carrying around a picture of her sister to remember her sister-she found a spot to put her husbands ashes-Its time to let go for a while-I want to be normal-I want to be happy-    Progress on Treatment Objective(s) / Homework:  Minimal progress - ACTION (Actively working towards change); Intervened by reinforcing change plan / affirming steps taken    Motivational Interviewing    MI Intervention: Expressed Empathy/Understanding, Supported Autonomy, Collaboration, Evocation, Permission to raise concern or advise, Open-ended questions, Reflections: simple and complex, Rolled with resistance: Emphasized patient autonomy, Simple reflection and Evoked patient agenda and Change talk (evoked)     Change Talk Expressed by the Patient: Desire to change Ability to change Reasons to change Need to change Committment to change Activation Taking steps    Provider Response to Change Talk: E - Evoked more info from patient about behavior change, A  - Affirmed patient's thoughts, decisions, or attempts at behavior change, R - Reflected patient's change talk and S - Summarized patient's change talk statements      Care Plan review completed: No    Medication Review:  No changes to current psychiatric medication(s)     Medication Compliance:  NA    Changes in Health Issues:   None reported     Chemical Use Review:   Substance Use: Chemical use reviewed, no active concerns identified      Tobacco Use: No current tobacco use.      Assessment: Current Emotional / Mental Status (status of significant symptoms):  Risk status (Self / Other harm or suicidal ideation)  Patient has had a history of suicidal ideation: yes  Patient denies current fears or concerns for personal safety.  Patient denies current or recent suicidal ideation or behaviors.  Patient denies current or recent homicidal ideation or behaviors.  Patient denies current or recent self injurious behavior or ideation.  Patient denies other safety concerns.  A safety and risk management plan has not been developed at this time, however patient was encouraged to call [place] [phone] should there be a change in any of these risk factors.    Appearance:   Appropriate   Eye Contact:   Good   Psychomotor Behavior: Normal   Attitude:   Cooperative   Orientation:   All  Speech   Rate / Production: Normal    Volume:  Normal   Mood:    Normal  Affect:    Appropriate  Bright   Thought Content:  Clear   Thought Form:  Coherent   Insight:    Good     Diagnoses:  1. Schizoaffective disorder, depressive type (H)    2. Posttraumatic stress disorder        Collateral Reports Completed:  Not Applicable    Plan: (Homework, other):  Patient was given information about behavioral services and encouraged to schedule a follow up appointment with the clinic [facility] as needed to continue to work on the patient use of DBT mindful coping with cognitive challenge with the psychotic symptoms.  She was also given information about  mental health symptoms and treatment options .  CD Recommendations: Maintain Sobriety.    Richardson Lopez, [facility], [facility]                                                    Treatment Plan    Client's Name: Nena Tang  YOB: 1960    Date: 6-4-2019    DSM5 Diagnoses: (Sustained by DSM5 Criteria Listed Above)  Diagnoses:  295.70 (F25.0), Schizoaffective disorder, depressive type; 309.81 (F43.10) Posttraumatic Stress Disorder with panic specifier, history of chemical dependency issues (polysubstance dependence)  Psychosocial & Contextual Factors: Academics / Education - yes  Activities of Daily Living - yes  Financial management yes  Follow through with Medical recommendations - yes  Occupational / Vocational - yes  Social / Relational - yes, grief and loss  WHODAS Score: 30      Referral / Collaboration:  Referral to another professional/service is not indicated at this time..    Anticipated number of session or this episode of care: 20      MeasurableTreatment Goal(s) related to diagnosis / functional impairment(s)  Goal 1: Client will improve her ability to manage anxiety and mood states.     I will know I've met my goal when the man does not paralyze the me with fear.     Objective #A (Client Action)    Client will increase understanding of steps in the grief process.  Status: New - Date: 6-4-2019     Intervention(s)  Therapist will teach emotional recognition/identification. teach the navigation of grieving encouraging acceptance and adjustment.    Objective #B  Client will Decrease frequency and intensity of feeling down, depressed, hopeless  Decrease thoughts that you'd be better off dead or of suicide / self-harm.  Status: New - Date: 6-4-2019     Intervention(s)  Therapist will teach emotional regulation skills. with the use of mindful coping strategies and open communication of feelings and thoughts (getting it out to another person).    Objective #C  Client will identify at least 3 example(s) of  how drinking has resulted in an experience that interferes with person values or goals.  Status: New - Date: 6-4-2019    Intervention(s)  Therapist will teach the client how to perform a behavioral chain analysis. Process the experience of playing the tape forward to help with making the next right decision. .          Client has reviewed and agreed to the above plan.      Richardson Lopez, LICSW  June 4, 2019          ______________________________________________________________________

## 2019-08-07 DIAGNOSIS — E11.3299 TYPE 2 DIABETES MELLITUS WITH MILD NONPROLIFERATIVE RETINOPATHY WITHOUT MACULAR EDEMA, WITH LONG-TERM CURRENT USE OF INSULIN, UNSPECIFIED LATERALITY (H): ICD-10-CM

## 2019-08-07 DIAGNOSIS — Z79.4 TYPE 2 DIABETES MELLITUS WITH MILD NONPROLIFERATIVE RETINOPATHY WITHOUT MACULAR EDEMA, WITH LONG-TERM CURRENT USE OF INSULIN, UNSPECIFIED LATERALITY (H): ICD-10-CM

## 2019-08-07 NOTE — TELEPHONE ENCOUNTER
"Requested Prescriptions   Pending Prescriptions Disp Refills     insulin pen needle (NOVOFINE 30) 30G X 8 MM miscellaneous  Last Written Prescription Date:  4/8/19  Last Fill Quantity: 400,  # refills: 0   Last office visit: 8/6/2019 with prescribing provider:     Future Office Visit:   Next 5 appointments (look out 90 days)    Aug 15, 2019  1:00 PM CDT  Office Visit with Eugenia De Jesus [facility] ([facility]) [address] [place] [address]  [address] [place] [id number]  [phone]   Aug 20, 2019 10:00 AM CDT  Return Visit with Richardson Lopez [facility] ([facility]) [address] [place] [address]  [address] [place] [phone]  [phone]   Sep 10, 2019 10:00 AM CDT  Return Visit with ART Arias CNP  [facility] ([facility]) [address] [place] [address]  [address] [place] [phone]  [phone]   Sep 10, 2019 10:00 AM CDT  [facility]  [facility] ([facility]) [address] [place] [address]  [address] [place] [phone]  [phone]   Sep 10, 2019 10:00 AM CDT  Office Visit with Eugenia De Jesus [facility] ([facility]) [address] [place] [address] [address] [place] [phone]  [phone]          400 each 0     Sig: USE 4 DAILY OR AS DIRECTED       Diabetic Supplies Protocol Passed - 8/7/2019  2:57 PM        Passed - Medication is active on med list        Passed - Patient is 18 years of age or older        Passed - Recent (6 mo) or future (30 days) visit within the authorizing provider's specialty     Patient had office visit in the last 6 months or has a visit in the next 30 days with authorizing provider.  See \"Patient Info\" tab in inbasket, " "or \"Choose Columns\" in Meds & Orders section of the refill encounter.              "

## 2019-08-07 NOTE — TELEPHONE ENCOUNTER
Signed Prescriptions:                        Disp   Refills    insulin pen needle (NOVOFINE 30) 30G X 8 M*400 ea*0        Sig: USE 4 DAILY OR AS DIRECTED  Authorizing Provider: MICHAELLE CALIXTO  Ordering User: PATI GARCIA

## 2019-08-08 ENCOUNTER — TELEPHONE (OUTPATIENT)
Dept: FAMILY MEDICINE | Facility: CLINIC | Age: 59
End: 2019-08-08

## 2019-08-08 DIAGNOSIS — R21 RASH AND NONSPECIFIC SKIN ERUPTION: ICD-10-CM

## 2019-08-08 DIAGNOSIS — G47.33 OSA (OBSTRUCTIVE SLEEP APNEA): Primary | ICD-10-CM

## 2019-08-08 RX ORDER — DIPHENHYDRAMINE HCL 25 MG
TABLET ORAL
Qty: 90 TABLET | Refills: 0 | Status: SHIPPED | OUTPATIENT
Start: 2019-08-08 | End: 2019-08-09

## 2019-08-08 NOTE — TELEPHONE ENCOUNTER
Please call and have Nena reschedule with sleep medicine. Referral in place for [facility].   Take Benadryl 25 mg in the morning and at 2 pm.   ART Oliver CNP

## 2019-08-08 NOTE — TELEPHONE ENCOUNTER
Returned Alva's call from 08/06/2019, as they called inquiring about the patient (see 08/06/2019 TE), left voicemail to return call to clinic    Kalie Perez, RN  Triage Nurse

## 2019-08-09 ENCOUNTER — OFFICE VISIT (OUTPATIENT)
Dept: SLEEP MEDICINE | Facility: CLINIC | Age: 59
End: 2019-08-09
Payer: MEDICARE

## 2019-08-09 VITALS
HEIGHT: 60 IN | WEIGHT: 235 LBS | HEART RATE: 85 BPM | SYSTOLIC BLOOD PRESSURE: 135 MMHG | OXYGEN SATURATION: 93 % | DIASTOLIC BLOOD PRESSURE: 76 MMHG | RESPIRATION RATE: 18 BRPM | BODY MASS INDEX: 46.13 KG/M2

## 2019-08-09 DIAGNOSIS — G47.33 OSA ON CPAP: Primary | ICD-10-CM

## 2019-08-09 PROCEDURE — 99214 OFFICE O/P EST MOD 30 MIN: CPT | Performed by: INTERNAL MEDICINE

## 2019-08-09 RX ORDER — DIPHENHYDRAMINE HCL 25 MG
TABLET ORAL
Qty: 90 TABLET | Refills: 0 | Status: ON HOLD | OUTPATIENT
Start: 2019-08-09 | End: 2019-12-21

## 2019-08-09 ASSESSMENT — MIFFLIN-ST. JEOR: SCORE: 1567.45

## 2019-08-09 NOTE — PROGRESS NOTES
SLEEP CLINIC FOLLOW UP VISIT    Date of visit: August 9, 2019    Purpose of visit: Follow-up of CINDY    History of present illness: Nena Tang is a 59-year-old female, with Schizoaffective disorder, depressive type, Posttraumatic stress disorder,  who presents to sleep clinic today accompanied by her care giver  from [facility] for f/u of previously diagnosed CINDY.    She was  previously diagnosed with CINDY categorized as mild with an AHI of 10.3 events per hour.  She underwent a PSG sleep study on 1/10/17, that demonstrated severe CINDY (AHI of 62.5 events per hour). CPAP titration was completed and this was adequate for the condition. The patient was placed on an Auto-titration CPAP at minimum pressure of 12 cmH2O and maximum pressure of 15 cmH2O.     Nena has not been using her CPAP device almost since May 2018.  There have been concerns about noncompliance with the CPAP treatment in the past.  She reports that there is a man who comes into her room when she puts the mask on  telling her not to use the machine(according to the caregiver there is no  one in her room). She was previously using a full facemask and now is interested in restarting the CPAP use and wants to get new supplies and re-start using the device.  She does report good sleep quality with the CPAP. She was last seen at the sleep clinic in May 2017 when she weighed 221 pounds and her current weight is 235 pounds.    DOWNLOADABLE COMPLIANCE DATA:   From February 20, 2018 through May 20, 2018 reveals that she used the device for 59 out of 90 days with an average use of 6 hours and 58 minutes on the days used. Residual AHI was 3.8 per hour. Median pressure settings: 14.3; 95th percentile : 14.8 and max pressure: 14.8 cm  water      PREVIOUS IN- LAB or HOME SLEEP STUDIES: [employer]              Date: Study done in 2012              TYPE: In-lab PSG study              AHI: 10.3 events per hour              Initial intervention:  Auto-CPAP with minium pressure of 5 cmH2O and maximum pressure of 15 cmH2O    Current meds:  Current Outpatient Medications   Medication Sig Dispense Refill     acetaminophen (TYLENOL) 500 MG tablet Take 2 tablets (1,000 mg) by mouth 3 times daily as needed for mild pain 100 tablet 3     aspirin (ASA) 81 MG EC tablet TAKE 1 TABLET (81MG) BY MOUTH DAILY 30 tablet 3     atorvastatin (LIPITOR) 80 MG tablet TAKE 1 TABLET (80MG) BY MOUTH DAILY 30 tablet 11     benztropine (COGENTIN) 0.5 MG tablet Take 2 tablets (1 mg) by mouth 2 times daily 120 tablet 3     blood glucose (NO BRAND SPECIFIED) test strip Use to test blood sugar  4 times daily or as directed. To accompany: Blood Glucose Monitor Brands: per insurance. 100 strip 11     calcium carbonate (OS-ALLEN) 1500 (600 Ca) MG tablet Take 3 tablets (1,800 mg) by mouth daily 180 tablet 3     cholecalciferol (VITAMIN D3) 57699 units (1250 mcg) capsule TAKE 1 CAPSULE (50,000 UNITS) MONTHLY 4 capsule 3     clonazePAM (KLONOPIN) 0.5 MG tablet Take 1 tablet (0.5 mg) by mouth At Bedtime 30 tablet 0     diphenhydrAMINE (BENADRYL) 25 MG tablet Take 1 tablet in am and 1 tablet at 2 pm. Until Rash resolves 90 tablet 0     gabapentin (NEURONTIN) 300 MG capsule Take 3 capsules (900 mg) by mouth 3 times daily 270 capsule 1     guaiFENesin (ROBITUSSIN) 100 MG/5ML liquid Take 10 mLs (200 mg) by mouth nightly as needed for cough 118 mL 0     haloperidol (HALDOL) 10 MG tablet Take 5 mg by mouth 2 times daily as needed for agitation       ibuprofen (ADVIL/MOTRIN) 200 MG tablet Take 200 mg by mouth every 8 hours as needed for mild pain       insulin aspart (NOVOLOG FLEXPEN) 100 UNIT/ML pen Inject 25 Units Subcutaneous 3 times daily (with meals) Once daily, can add additional 5 units if BGs are >500mg/dL. 15 mL 11     insulin glargine (LANTUS SOLOSTAR PEN) 100 UNIT/ML pen Inject 60 Units Subcutaneous At Bedtime 15 mL 11     insulin pen needle (NOVOFINE 30) 30G X 8 MM miscellaneous USE 4 DAILY  OR AS DIRECTED 400 each 0     ipratropium - albuterol 0.5 mg/2.5 mg/3 mL (DUONEB) 0.5-2.5 (3) MG/3ML neb solution Take 1 vial (3 mLs) by nebulization every 6 hours as needed for shortness of breath / dyspnea or wheezing 30 vial 0     lidocaine (LIDODERM) 5 % patch Place 1 patch onto the skin every 24 hours 10 patch 0     losartan (COZAAR) 50 MG tablet Take 1 tablet (50 mg) by mouth daily 90 tablet 3     melatonin 5 MG CAPS Take 2 capsules by mouth At Bedtime 180 capsule 3     metoprolol succinate ER (TOPROL-XL) 25 MG 24 hr tablet Take 1 tablet (25 mg) by mouth daily 90 tablet 1     nystatin (MYCOSTATIN) 047827 UNIT/GM external cream Apply topically 2 times daily 30 g 3     nystatin (MYCOSTATIN) 862380 UNIT/GM external powder Apply topically once as needed for dry skin 60 g 3     order for DME Equipment being ordered: CPAP Supplies. 1 each 0     order for DME Equipment being ordered: Depends. 30 each 1     order for DME Equipment being ordered: Freestyle [employer] [place] 1 Device 0     paliperidone ER (INVEGA) 9 MG 24 hr tablet Take 1 tablet (9 mg) by mouth every morning 30 tablet 2     polyethylene glycol (MIRALAX) powder Take 17 g (1 capful) by mouth daily as needed for constipation 500 g 3     ranitidine (ZANTAC) 300 MG tablet TAKE 1 TABLET (300MG) BY MOUTH AT BEDTIME 90 tablet 1     semaglutide (OZEMPIC) 1 MG/DOSE pen Inject 1 mg Subcutaneous every 7 days 6 mL 5     sertraline (ZOLOFT) 100 MG tablet Take 2 tablets (200 mg) by mouth daily 60 tablet 3     topiramate (TOPAMAX) 50 MG tablet Take 1.5 tablets (75 mg) by mouth 2 times daily 90 tablet 3     Past medical history:  Past Medical History:   Diagnosis Date     Acute respiratory failure with hypoxia (H) 9/4/2017     CAD (coronary artery disease)     5/2014 cath, nonbostructive stenosis to LAD, RCA.     Chronic low back pain 1/22/2013     Cocaine abuse, in remission (H)      Fecal urgency 3/8/2012     History of heroin abuse      Hyperlipidemia LDL goal <100  10/31/2010     Hypertension 7/29/2013     Illiterate 8/30/2011     Irritable bowel syndrome      Left cataract      Migraine 4/19/2012     Migraine headache 4/22/2013     Moderate major depression (H) 6/8/2011     Noncompliance with medication regimen 6/8/2011     Obesity      CINDY (obstructive sleep apnea) 3/8/2012    uses CPAP     Osteopenia 10/7/2009     Pneumonia of right lower lobe due to infectious organism (H) 9/4/2017     Schizoaffective disorder, depressive type (H) 2/25/2013     Sepsis (H) 8/29/2017     Suicidal intent 10/2/2013     Takotsubo cardiomyopathy      Type 2 diabetes mellitus (H) 8/30/2011     Uterine cancer (H) 1983     Verbal auditory hallucination 10/4/2012     Patient Active Problem List   Diagnosis     Osteopenia     Vitamin B12 deficiency without anemia     Hyperlipidemia LDL goal <100     Rotator cuff syndrome     Type 2 diabetes mellitus with mild nonproliferative retinopathy (H)     Illiterate     Irritable bowel syndrome     overweight - BMI >35     Takotsubo cardiomyopathy     CAD (coronary artery disease)     Restless legs syndrome (RLS)     CINDY (obstructive sleep apnea)- mild AHI 10.3     Verbal auditory hallucination     Chronic low back pain     Schizoaffective disorder, depressive type (H)     Migraine headache     HTN, goal below 140/90     Health Care Home     Lumbago     Cervicalgia     Cocaine abuse, episodic (H)     Suicidal ideation     Esophageal reflux     Mild nonproliferative diabetic retinopathy (H)     Tardive dyskinesia     Alcohol use     Left cataract     Falls frequently     History of uterine cancer     Psychophysiological insomnia     Dysuria     Asymptomatic postmenopausal status     Abdominal pain, right lower quadrant     Infectious encephalopathy     Non-intractable vomiting with nausea     Thoughts of self harm     Gastroenteritis     Posttraumatic stress disorder     Cervical cancer screening     Type 2 diabetes mellitus with stage 3 chronic kidney disease,  with long-term current use of insulin (H)     Positive AIDA (antinuclear antibody)     Hyperglycemia     Pneumonia     Past surgical history:,  Past Surgical History:   Procedure Laterality Date     C OOPHORECTORMY FOR MALIG, W/BX  1983    UTERINE     CATARACT IOL, RT/LT Bilateral 2017     COLONOSCOPY N/A 3/16/2017    Procedure: COLONOSCOPY;  Surgeon: Traci Gonzalez MD;  Location:  GI     Coronary CTA  5/21/2014     HYSTERECTOMY  1983    uterine cancer yearly pap's per provider.     LAPAROSCOPIC CHOLECYSTECTOMY  2008     PHACOEMULSIFICATION CLEAR CORNEA WITH STANDARD INTRAOCULAR LENS IMPLANT Left 5/5/2017    Procedure: PHACOEMULSIFICATION CLEAR CORNEA WITH STANDARD INTRAOCULAR LENS IMPLANT;  LEFT EYE PHACOEMULSIFICATION CLEAR CORNEA WITH STANDARD INTRAOCULAR LENS IMPLANT ;  Surgeon: Tyra Diaz MD;  Location:  EC     PHACOEMULSIFICATION CLEAR CORNEA WITH STANDARD INTRAOCULAR LENS IMPLANT Right 6/30/2017    Procedure: PHACOEMULSIFICATION CLEAR CORNEA WITH STANDARD INTRAOCULAR LENS IMPLANT;  RIGHT EYE PHACOEMULSIFICATION CLEAR CORNEA WITH STANDARD INTRAOCULAR LENS IMPLANT;  Surgeon: Tyra Diaz MD;  Location:  [facility]     RELEASE TRIGGER FINGER  10/11/2012    Left thumb. Procedure: RELEASE TRIGGER FINGER;  LEFT THUMB TRIGGER RELEASE;  Surgeon: Tay Langley MD;  Location:  [facility]     RELEASE TRIGGER FINGER Right 12/26/2016    Procedure: RELEASE TRIGGER FINGER;  Surgeon: Albino Castañeda MD;  Location:  OR     Allergies:  Allergies   Allergen Reactions     Imidazole Antifungals Hives     Tolerates diflucan     Ketoprofen Itching     Pruritis to topical     Lisinopril Hives     Metformin Other (See Comments)     Patient hospitalized for lactic acidosis - admitting provider suspectd caused by metformin     Metronidazole Hives     Posaconazole Hives     Tolerates diflucan     Social history:  Social History     Tobacco Use     Smoking status: Never Smoker     Smokeless tobacco: Never Used    Substance Use Topics     Alcohol use: No     Frequency: Never     Comment: last month     Drug use: No     Comment: history of      Family history:   Family History   Problem Relation Age of Onset     Cancer Mother         BLADDER     Respiratory Mother         COPD     Gastrointestinal Disease Mother         CIRRHOSIS OF LI BOLIVAR     Alcohol/Drug Mother      Diabetes Mother      Hypertension Mother      Lipids Mother      C.A.D. Mother      Glaucoma Mother      Alcohol/Drug Sister      Mental Illness Sister      Alcohol/Drug Sister      Psychotic Disorder Sister      Cancer Maternal Grandmother         UNKNOWN TYPE     Cancer Brother         COLON     Cancer - colorectal Brother         IN HIS LATE 30S     Alcohol/Drug Brother          OF HEROIN OVERDOSE AT AGE 22 YRS     Macular Degeneration No family hx of        Exam:  /76   Pulse 85   Resp 18   Ht 1.524 m (5')   Wt 106.6 kg (235 lb)   LMP 2015   SpO2 93%   BMI 45.90 kg/m    General appearance: obese female, pleasant,  in no apparent distress  Pt is dressed casually, cooperative with good eye contact.   Speech is spontaneous with regular rate and volume.   Mood: euthymic  Sensorium: awake, alert and oriented to person, place, time, and situation.      Assessment/Plan:  Previously diagnosed severe obstructive sleep apnea: Patient has not been using the CPAP device since May  2018.  Noncompliance of CPAP was a concern with her even in the past, compliance is being affected by the poor cognitive difficulties and psychiatric conditions.    Based on the compliance measures and the weight gain since her last sleep clinic visit, the pressure settings on her auto CPAP were revised: Minimum pressure equal to 13 and max pressure equal to 18 cm water.  Prescription was provided for renewal of all the CPAP supplies.  We discussed in detail the consequences of untreated sleep apnea particularly given the overall severity of the sleep apnea.   "Recommended her to start using the device regularly during sleep and use it during the entire sleep duration to derive maximum benefit.  The caregiver was very much engaged during the visit and is wanting to help Nena.  She will followed  through the [facility].    F/u in 8 weeks, with review of CPAP compliance measures.    Schizoaffective disorder, depressive type, Posttraumatic stress disorder: She will continue to follow-up through the [facility]'s clinics including behavioral health.    Obesity: Encouraged following healthy diet and regular exercise.  Patient was strongly advised to avoid driving, operating any heavy machinery or other hazardous situations while drowsy or sleepy.  Patient was counseled on the importance of driving while alert, to pull over if drowsy, or nap before getting into the vehicle if sleepy.     The above note was dictated using voice recognition software. Although reviewed after completion, some word and grammatical error may remain . Please contact the author for any clarifications.    \"I spent a total of 25 minutes face to face with Nena Tang during today's office visit. Over 50% of this time was spent counseling the patient and  coordinating care regarding obstructive sleep apnea, consequences of untreated sleep apnea, improving CPAP compliance.\"       Pippa Paredes MD   of Medicine,  Division of Pulmonary/Sleep Medicine  [facility].    "

## 2019-08-10 ENCOUNTER — NURSE TRIAGE (OUTPATIENT)
Dept: NURSING | Facility: CLINIC | Age: 59
End: 2019-08-10

## 2019-08-11 NOTE — TELEPHONE ENCOUNTER
Pt lives in adult foster care.Caregiver Alva reports pt gets Novolog 25 units 3x /day w/ each meal. BG before breakfast today 108. Got 25 units, ate sausage and egg sandwich. Before lunch BG 90, ate a corn dog and a few french fries, got 25 units. Before dinner BG 69 so staff held Novolog. Ate chicken partha and corn for dinner. After dinner BG 85 then most recently (6:30pm) . Also takes Lantus 60 units qhs. Staff ask should pt get Novolog tonight? Or hold? Staff states pt restarted CPAP last night after not using it for 1 year, 5 months.  said once back on CPAP this may lower BGs. Pt feeling well currently. Spoke w/ pt and she is alert, oriented. Told me what she ate at meals. Paged on-call for LEVY Joyner @7:15pm. Spoke w/ on-call Dr. Campo @7:18pm. Order rec'd:     1) Hold Novolog tonight    2) Starting tomorrow give Novolog 15 units 3x/day at meals only if BG over 100 and if she is going to eat.     3) Continue Lantus 60 units q hs (including tonight)     4) Talk to PCP on Mon 8/12.     Discussed orders w/ Alva at foster home and she verbalized understanding.     Reason for Disposition    Caller has URGENT medication question about med that PCP prescribed and triager unable to answer question    Additional Information    Negative: Drug overdose and nurse unable to answer question    Negative: Caller requesting information not related to medicine    Negative: Caller requesting a prescription for Strep throat and has a positive culture result    Negative: Rash while taking a medication or within 3 days of stopping it    Negative: Immunization reaction suspected    Negative: [1] Asthma and [2] having symptoms of asthma (cough, wheezing, etc)    Negative: MORE THAN A DOUBLE DOSE of a prescription or over-the-counter (OTC) drug    Negative: [1] DOUBLE DOSE (an extra dose or lesser amount) of over-the-counter (OTC) drug AND [2] any symptoms (e.g., dizziness, nausea, pain, sleepiness)    Negative: [1] DOUBLE  "DOSE (an extra dose or lesser amount) of prescription drug AND [2] any symptoms (e.g., dizziness, nausea, pain, sleepiness)    Negative: Took another person's prescription drug    Negative: [1] DOUBLE DOSE (an extra dose or lesser amount) of prescription drug AND [2] NO symptoms (Exception: a double dose of antibiotics)    Negative: Diabetes drug error or overdose (e.g., insulin or extra dose)    Negative: [1] Request for URGENT new prescription or refill of \"essential\" medication (i.e., likelihood of harm to patient if not taken) AND [2] triager unable to fill per unit policy    Negative: [1] Prescription not at pharmacy AND [2] was prescribed today by PCP    Negative: Pharmacy calling with prescription questions and triager unable to answer question    Protocols used: MEDICATION QUESTION CALL-A-AH      "

## 2019-08-15 ENCOUNTER — TELEPHONE (OUTPATIENT)
Dept: FAMILY MEDICINE | Facility: CLINIC | Age: 59
End: 2019-08-15

## 2019-08-15 DIAGNOSIS — F25.0 SCHIZOAFFECTIVE DISORDER, BIPOLAR TYPE (H): ICD-10-CM

## 2019-08-15 DIAGNOSIS — E11.3299 TYPE 2 DIABETES MELLITUS WITH MILD NONPROLIFERATIVE RETINOPATHY WITHOUT MACULAR EDEMA, WITH LONG-TERM CURRENT USE OF INSULIN, UNSPECIFIED LATERALITY (H): ICD-10-CM

## 2019-08-15 DIAGNOSIS — Z79.4 TYPE 2 DIABETES MELLITUS WITH MILD NONPROLIFERATIVE RETINOPATHY WITHOUT MACULAR EDEMA, WITH LONG-TERM CURRENT USE OF INSULIN, UNSPECIFIED LATERALITY (H): ICD-10-CM

## 2019-08-15 RX ORDER — CLONAZEPAM 0.5 MG/1
0.5 TABLET ORAL AT BEDTIME
Qty: 30 TABLET | Refills: 0 | Status: SHIPPED | OUTPATIENT
Start: 2019-08-15 | End: 2019-09-16

## 2019-08-15 NOTE — TELEPHONE ENCOUNTER
Script for clonazepam sent to pharmacy electronically.  Will defer other concern to PCP.    Rebecca Carr, DO on 8/15/2019 at 1:05 PM  (covering provider)

## 2019-08-15 NOTE — TELEPHONE ENCOUNTER
"Call to Adri at  with new orders for Insulin, she also requested I fax them to [phone].    She also requested a RF clonazepam 0.5 mg at bedtime    Controlled Substance Refill Request for clonazepam 0.5 mg    Last refill: 7/11/19  #30 for 30 days    Last clinic visit: 8/6/19     Next appt: 8/20/19  Controlled substance agreement on file: No.    Documentation in problem list reviewed:  Yes    Processing:  Fax Rx to [facility]    [place] reviewed:  reviewed- no concerns  [place]  [email or web address]        Also stated that the ulcer in skin folds is not healing, would like \" Butt cream\" or   Should pt be seen?  I did put her in an appt on 8/20 if needed.      Routing to PCP for direction    Wendy Schwartz RN  "

## 2019-08-15 NOTE — PROGRESS NOTES
Decrease Novolog to 20 units with meals.     Hold insulin if blood sugars are less than 70.     ART Oliver CNP

## 2019-08-16 ENCOUNTER — TELEPHONE (OUTPATIENT)
Dept: FAMILY MEDICINE | Facility: CLINIC | Age: 59
End: 2019-08-16

## 2019-08-16 LAB — GLUCOSE BLDC GLUCOMTR-MCNC: NORMAL MG/DL (ref 70–99)

## 2019-08-16 NOTE — TELEPHONE ENCOUNTER
Call from [facility] lab, Glucose by meter ordered  was never received in lab, per PCP this was ordered erroneously.  Wendy Schwartz RN

## 2019-08-21 ENCOUNTER — OFFICE VISIT (OUTPATIENT)
Dept: PHARMACY | Facility: CLINIC | Age: 59
End: 2019-08-21
Payer: COMMERCIAL

## 2019-08-21 DIAGNOSIS — E11.65 TYPE 2 DIABETES MELLITUS WITH HYPERGLYCEMIA, WITH LONG-TERM CURRENT USE OF INSULIN (H): Primary | ICD-10-CM

## 2019-08-21 DIAGNOSIS — M25.512 CHRONIC PAIN OF BOTH SHOULDERS: ICD-10-CM

## 2019-08-21 DIAGNOSIS — Z79.4 TYPE 2 DIABETES MELLITUS WITH HYPERGLYCEMIA, WITH LONG-TERM CURRENT USE OF INSULIN (H): Primary | ICD-10-CM

## 2019-08-21 DIAGNOSIS — G89.29 CHRONIC PAIN OF BOTH SHOULDERS: ICD-10-CM

## 2019-08-21 DIAGNOSIS — M25.511 CHRONIC PAIN OF BOTH SHOULDERS: ICD-10-CM

## 2019-08-21 PROCEDURE — 99607 MTMS BY PHARM ADDL 15 MIN: CPT | Performed by: PHARMACIST

## 2019-08-21 PROCEDURE — 99606 MTMS BY PHARM EST 15 MIN: CPT | Performed by: PHARMACIST

## 2019-08-21 NOTE — PROGRESS NOTES
SUBJECTIVE/OBJECTIVE:                Nena Tang is a 59 year old female coming in for a follow-up visit for Medication Therapy Management.  She was referred to me from Rowena Haas Accompanied by foster home managers.     Chief Complaint: Follow up from our visit on 19.  Here to learn how to use her Dexcom 5.     Tobacco: No tobacco use  Alcohol: not currently using    Medication Adherence/Access: no issues reported  Adult foster home staff sets up and administers medications, except when pt leaves home     Diabetes: Pt currently taking lantus 60u at bedtime, Novolog 25u usually 2-3 times a day with meals, and semaglutide 1mg weekly. Pt is not experiencing side effects.    Pt received Dexcom5 and interested in starting today. Staff at her home are unable to place the device themselves because of licensing restrictions, but can watch and help her with the instructions.   SMB times daily. Pt and staff did not have any concerns in glucose readings today, numbers were not reviewed.   Patient is not experiencing hypoglycemia.   Recent symptoms of high blood sugar? none  Eye exam: up to date  Foot exam: up to date  ACEi/ARB: No.   Urine Albumin:         Lab Results   Component Value Date     UMALCR 4.47 2019      Aspirin: Taking 81mg daily and denies side effects  Diet/Exercise: did not discuss today        Lab Results   Component Value Date     A1C 7.6 05/15/2019     A1C 6.4 2018     A1C 6.4 2018     A1C 6.2 2018     A1C 6.8 2018     Shoulder pain: pain has resolved with injections, has PT scheduled tomorrow. Hasn't needed to take any PRN nsaid. Continues to take gabapentin 900mg TID without problems. Fior also states she has been taking acetaminophen TID scheduled but seems more out of habit at this point then actually needing the medication for pain relief.     ASSESSMENT:              Current medications were reviewed today as discussed above.      Medication  Adherence: good, no issues identified    Diabetes: needs improvement. A1c not at goal <7%. Spent majority of the visit today walking patient through placing a sensor on her abdomen. She was able to do this successfully, though with some difficulty. She is agreeable to practice at home and will review again at follow-up.     Shoulder pain: improved. Need to discontinue acetaminophen with starting Dexcom5 because of interference with the system. OK to use nsaid sparingly.        PLAN:                  1. Dexcom5 sensor placed during visit today and education provided  2. Discontinue acetaminophen    I spent 60 minutes with this patient today. All changes were made via collaborative practice agreement with Rowena Haas. A copy of the visit note was provided to the patient's primary care provider.     Will follow up in 2-3 weeks.    The patient was given a summary of these recommendations as an after visit summary.    Eugenia De Jesus, PharmD, BCACP

## 2019-08-21 NOTE — PATIENT INSTRUCTIONS
Recommendations from today's MTM visit:                                                      We started the Dexcom sensor today.  If you have problems, I will be in the clinic  Tomorrow for questions - [phone]    You cannot use Tylenol because it interferes with the Dexcom readings.  If you have bad pain, then ask for ibuprofen.     It was great to speak with you today.  I value your experience and would be very thankful for your time with providing feedback on our clinic survey. You may receive a survey via email or text message in the next few days.     Next MTM visit: I'll see you with Rowena next time    To schedule another MTM appointment, please call the clinic directly or you may call the MTM scheduling line at [phone] or toll-free at [phone].     My Clinical Pharmacist's contact information:                                                      It was a pleasure talking with you today!  Please feel free to contact me with any questions or concerns you have.      Eugenia De Jesus, PharmD, BCACP

## 2019-08-22 ENCOUNTER — TELEPHONE (OUTPATIENT)
Dept: FAMILY MEDICINE | Facility: CLINIC | Age: 59
End: 2019-08-22

## 2019-08-26 DIAGNOSIS — E66.01 MORBID OBESITY (H): ICD-10-CM

## 2019-08-28 RX ORDER — TOPIRAMATE 50 MG/1
75 TABLET, FILM COATED ORAL 2 TIMES DAILY
Qty: 270 TABLET | Refills: 0 | Status: SHIPPED | OUTPATIENT
Start: 2019-08-28 | End: 2019-11-21

## 2019-08-28 NOTE — TELEPHONE ENCOUNTER
topiramate (TOPAMAX) 50 MG tablet      Last Office Visit : 7/22/19  Future Office visit:  10/24/19

## 2019-08-29 DIAGNOSIS — R05.9 COUGH: ICD-10-CM

## 2019-08-29 DIAGNOSIS — M54.2 CERVICALGIA: ICD-10-CM

## 2019-08-29 NOTE — TELEPHONE ENCOUNTER
Controlled Substance Refill Request for gabapentin (NEURONTIN) 300 MG capsule  Problem List Complete:  Yes   checked in past 3 months?  No, route to RN

## 2019-08-30 RX ORDER — GABAPENTIN 300 MG/1
900 CAPSULE ORAL 3 TIMES DAILY
Qty: 270 CAPSULE | Refills: 1 | Status: SHIPPED | OUTPATIENT
Start: 2019-08-30 | End: 2019-10-22

## 2019-08-30 NOTE — TELEPHONE ENCOUNTER
Gabapentin       Last Written Prescription Date:  7/2/19  Last Fill Quantity: 270,   # refills: 1  Last Office Visit: 8/6/19  Future Office visit:    Next 5 appointments (look out 90 days)    Sep 10, 2019 10:00 AM CDT  [facility] Visit with ART Arias CNP  [facility] [address]  [address]  [facility] [address]  [phone]   Sep 10, 2019 10:00 AM CDT  Return Visit with Richardson Lopez, [facility] ([facility]) [address]  [address]  [facility] [address]  [phone]   Sep 10, 2019 10:00 AM CDT  Office Visit with Eugenia De Jesus [facility] [place] ([facility]) [address] [place] [address]  [address]  [facility] [address]  [phone]   Sep 25, 2019  4:30 PM CDT  Return Visit with Reginald Brothers MD  [facility] ([facility]) [address]  [facility] [address]  [phone]           Routing refill request to provider for review/approval because:  Drug not on the [facility], P or  Health refill protocol or controlled substance    Last script in [place]  was written by PCP and was (re)filled on 7/29/19 from 252 tabs. Routed to PCP for review. Sakshi Viera RN August 30, 2019 8:57 AM

## 2019-09-10 ENCOUNTER — OFFICE VISIT (OUTPATIENT)
Dept: BEHAVIORAL HEALTH | Facility: CLINIC | Age: 59
End: 2019-09-10
Payer: MEDICARE

## 2019-09-10 ENCOUNTER — OFFICE VISIT (OUTPATIENT)
Dept: FAMILY MEDICINE | Facility: CLINIC | Age: 59
End: 2019-09-10
Payer: MEDICARE

## 2019-09-10 ENCOUNTER — OFFICE VISIT (OUTPATIENT)
Dept: PHARMACY | Facility: CLINIC | Age: 59
End: 2019-09-10
Payer: COMMERCIAL

## 2019-09-10 VITALS
HEIGHT: 60 IN | WEIGHT: 242 LBS | SYSTOLIC BLOOD PRESSURE: 114 MMHG | TEMPERATURE: 98.6 F | OXYGEN SATURATION: 92 % | BODY MASS INDEX: 47.51 KG/M2 | RESPIRATION RATE: 16 BRPM | HEART RATE: 85 BPM | DIASTOLIC BLOOD PRESSURE: 60 MMHG

## 2019-09-10 DIAGNOSIS — F25.1 SCHIZOAFFECTIVE DISORDER, DEPRESSIVE TYPE (H): Primary | ICD-10-CM

## 2019-09-10 DIAGNOSIS — R45.89 THOUGHTS OF SELF HARM: ICD-10-CM

## 2019-09-10 DIAGNOSIS — F25.1 SCHIZOAFFECTIVE DISORDER, DEPRESSIVE TYPE (H): ICD-10-CM

## 2019-09-10 DIAGNOSIS — E11.3299 TYPE 2 DIABETES MELLITUS WITH MILD NONPROLIFERATIVE RETINOPATHY WITHOUT MACULAR EDEMA, WITH LONG-TERM CURRENT USE OF INSULIN, UNSPECIFIED LATERALITY (H): ICD-10-CM

## 2019-09-10 DIAGNOSIS — I10 HTN, GOAL BELOW 140/90: ICD-10-CM

## 2019-09-10 DIAGNOSIS — Z79.4 TYPE 2 DIABETES MELLITUS WITH MILD NONPROLIFERATIVE RETINOPATHY WITHOUT MACULAR EDEMA, WITH LONG-TERM CURRENT USE OF INSULIN, UNSPECIFIED LATERALITY (H): ICD-10-CM

## 2019-09-10 DIAGNOSIS — G47.33 OSA (OBSTRUCTIVE SLEEP APNEA): ICD-10-CM

## 2019-09-10 DIAGNOSIS — E11.65 TYPE 2 DIABETES MELLITUS WITH HYPERGLYCEMIA, WITH LONG-TERM CURRENT USE OF INSULIN (H): Primary | ICD-10-CM

## 2019-09-10 DIAGNOSIS — Z79.4 TYPE 2 DIABETES MELLITUS WITH HYPERGLYCEMIA, WITH LONG-TERM CURRENT USE OF INSULIN (H): Primary | ICD-10-CM

## 2019-09-10 DIAGNOSIS — R44.0 VERBAL AUDITORY HALLUCINATION: ICD-10-CM

## 2019-09-10 DIAGNOSIS — R45.851 SUICIDAL IDEATION: ICD-10-CM

## 2019-09-10 DIAGNOSIS — F43.10 POSTTRAUMATIC STRESS DISORDER: ICD-10-CM

## 2019-09-10 PROCEDURE — 99606 MTMS BY PHARM EST 15 MIN: CPT | Mod: GY | Performed by: PHARMACIST

## 2019-09-10 PROCEDURE — 99215 OFFICE O/P EST HI 40 MIN: CPT | Performed by: NURSE PRACTITIONER

## 2019-09-10 PROCEDURE — 99607 MTMS BY PHARM ADDL 15 MIN: CPT | Mod: GY | Performed by: PHARMACIST

## 2019-09-10 PROCEDURE — 90832 PSYTX W PT 30 MINUTES: CPT | Performed by: SOCIAL WORKER

## 2019-09-10 ASSESSMENT — MIFFLIN-ST. JEOR: SCORE: 1599.2

## 2019-09-10 NOTE — PROGRESS NOTES
[facility] - Integrated Primary Care   September 10, 2019    Behavioral Health Clinician Progress Note    Patient Name: Nena Tang           Service Type:  Individual      Service Location:   Face to Face in Clinic     Session Start Time: 10:35am  Session End Time: 11am      Session Length: 16 - 37      Attendees: Patient    Visit Activities (Refresh list every visit): [facility] [employer]     Diagnostic Assessment Date: 1-23-18    Treatment Plan Review Date: 12-  See Flowsheets for today's PHQ-9 and TAMIA-7 results  Previous PHQ-9:   PHQ-9 SCORE 3/13/2018 10/26/2018 5/7/2019   PHQ-9 Total Score - - -   PHQ-9 Total Score - - -   PHQ-9 Total Score 14 20 22     Previous TAMIA-7:   TAMIA-7 SCORE 2/3/2017 3/13/2018 10/26/2018   Total Score - - -   Total Score 0 17 19   Total Score - - -       ALEXANDRA LEVEL:  ALEXANDRA Score (Last Two) 8/30/2011 6/9/2014   ALEXANDRA Raw Score 42 37   Activation Score 66 49.9   ALEXANDRA Level 3 2       DATA  Extended Session (60+ minutes): No  Interactive Complexity: No  Crisis: No  [facility] Patient: No    Treatment Objective(s) Addressed in This Session:  Target Behavior(s): disease management/lifestyle changes mental health    Depressed Mood: Increase interest, engagement, and pleasure in doing things  Decrease frequency and intensity of feeling down, depressed, hopeless  Improve quantity and quality of night time sleep / decrease daytime naps  Identify negative self-talk and behaviors: challenge core beliefs, myths, and actions  Improve concentration, focus, and mindfulness in daily activities   Feel less fidgety, restless or slow in daily activities / interpersonal interactions  Decrease thoughts that you'd be better off dead or of suicide / self-harm  Anxiety: will experience a reduction in anxiety, will develop more effective coping skills to manage anxiety symptoms and will develop healthy cognitive patterns and beliefs  Risk / Safety: will develop strategies for more effective management of risk  issues  Grief / Loss: will engage in effective approach to address and resolve grief/loss issues and will process grief/loss issues in an adaptive manner  Thought Disturbance: will develop skills to more effectively manage symptoms, will develop more effective support systems and will continue to take medications as prescribed    Current Stressors / Issues:    The patient was seen by [facility] per the request of the PCP-the patient talked about feeling down and that she remembers this starting after she starting the CPAP machine-she has been going to the drop in center and she feels ronal when she goes to the drop in center-the CPAP machine is good and it is getting better being able to wear the machine-she has been using her walking-she has new medication to help with sleep-her sister's birthday is coming up-she has been feeling down and she is a fighter and will not stay down-goes to the drop in center everyday-sleep is better-appetite has been okay-no current suicidal thoughts-she has a plan to go to the hospital if she is feeling harm to self-when you have healthy distraction she can stop hearing the voices-      Progress on Treatment Objective(s) / Homework:  Minimal progress - PREPARATION (Decided to change - considering how); Intervened by negotiating a change plan and determining options / strategies for behavior change, identifying triggers, exploring social supports, and working towards setting a date to begin behavior change    Motivational Interviewing    MI Intervention: Expressed Empathy/Understanding, Supported Autonomy, Collaboration, Evocation, Permission to raise concern or advise, Open-ended questions, Reflections: simple and complex, Rolled with resistance: Emphasized patient autonomy, Simple reflection and Evoked patient agenda and Change talk (evoked)     Change Talk Expressed by the Patient: Desire to change Ability to change Reasons to change Need to change Committment to change Activation Taking  steps    Provider Response to Change Talk: E - Evoked more info from patient about behavior change, A - Affirmed patient's thoughts, decisions, or attempts at behavior change, R - Reflected patient's change talk and S - Summarized patient's change talk statements      Care Plan review completed: No    Medication Review:  No changes to current psychiatric medication(s)     Medication Compliance:  NA    Changes in Health Issues:   None reported     Chemical Use Review:   Substance Use: Chemical use reviewed, no active concerns identified      Tobacco Use: No current tobacco use.      Assessment: Current Emotional / Mental Status (status of significant symptoms):  Risk status (Self / Other harm or suicidal ideation)  Patient has had a history of suicidal ideation: yes  Patient denies current fears or concerns for personal safety.  Patient denies current or recent suicidal ideation or behaviors.  Patient denies current or recent homicidal ideation or behaviors.  Patient denies current or recent self injurious behavior or ideation.  Patient denies other safety concerns.  A safety and risk management plan has not been developed at this time, however patient was encouraged to call [place] [phone] should there be a change in any of these risk factors.    Appearance:   Appropriate   Eye Contact:   Good   Psychomotor Behavior: Normal   Attitude:   Cooperative   Orientation:   All  Speech   Rate / Production: Normal    Volume:  Normal   Mood:    Depressed  Normal  Affect:    Appropriate  Lethargic  Worrisome   Thought Content:  Clear   Thought Form:  Coherent  Goal Directed  Logical   Insight:    Fair     Diagnoses:  1. Schizoaffective disorder, depressive type (H)    2. Posttraumatic stress disorder        Collateral Reports Completed:  Not Applicable    Plan: (Homework, other):  Patient was given information about behavioral services and encouraged to schedule a follow up appointment with the clinic [facility] as needed to  continue to work on the patient use of DBT mindful coping with cognitive challenge with the psychotic symptoms and to work on stress management around her grieving process.  She was also given information about mental health symptoms and treatment options .  CD Recommendations: Maintain Sobriety.    Richardson Lopez, [facility]                                                    Treatment Plan    Client's Name: Nena Tang  YOB: 1960    Date: 6-4-2019    DSM5 Diagnoses: (Sustained by DSM5 Criteria Listed Above)  Diagnoses:  295.70 (F25.0), Schizoaffective disorder, depressive type; 309.81 (F43.10) Posttraumatic Stress Disorder with panic specifier, history of chemical dependency issues (polysubstance dependence)  Psychosocial & Contextual Factors: Academics / Education - yes  Activities of Daily Living - yes  Financial management yes  Follow through with Medical recommendations - yes  Occupational / Vocational - yes  Social / Relational - yes, grief and loss  WHODAS Score: 30      Referral / Collaboration:  Referral to another professional/service is not indicated at this time..    Anticipated number of session or this episode of care: 20      MeasurableTreatment Goal(s) related to diagnosis / functional impairment(s)  Goal 1: Client will improve her ability to manage anxiety and mood states.     I will know I've met my goal when the man does not paralyze the me with fear.     Objective #A (Client Action)    Client will increase understanding of steps in the grief process.  Status: Continued - Date(s):  9-: able to remember the good time and able to remember these important people without wanting to die-she stated that she needs more work to be able to remember and not fall apart.     Intervention(s)  Therapist will teach emotional recognition/identification. teach the navigation of grieving encouraging acceptance and adjustment.    Objective #B  Client will Decrease frequency and intensity of  feeling down, depressed, hopeless  Decrease thoughts that you'd be better off dead or of suicide / self-harm.  Status: Continued - Date(s):   9-: she does not think about harming the self as much     Intervention(s)  Therapist will teach emotional regulation skills. with the use of mindful coping strategies and open communication of feelings and thoughts (getting it out to another person).    Objective #C  Client will identify at least 3 example(s) of how drinking has resulted in an experience that interferes with person values or goals.  Status: Completed - Date: She stated she was sober  for 25 years.    Intervention(s)  Therapist will teach the client how to perform a behavioral chain analysis. Process the experience of playing the tape forward to help with making the next right decision. .          Client has reviewed and agreed to the above plan.      Richardson Lopez, [facility]  June 4, 2019          ______________________________________________________________________

## 2019-09-10 NOTE — PATIENT INSTRUCTIONS
-Loosen up the CPAP mask.   -Call clinic with questions or concerns.   -Please have them fax the blood sugar report from the home.   -Appointment with Eugenia scheduled for next Wednesday 9/18/2019. Bring old sensor to the appointment.

## 2019-09-10 NOTE — PROGRESS NOTES
SUBJECTIVE/OBJECTIVE:                Nena Tang is a 59 year old female coming in for a follow-up visit for Medication Therapy Management.  She was referred to me from Rowena Haas. Seen as a covisit with PCP.  Meeting with Richardson Lopez [facility] after this visit.     Chief Complaint: Follow up from our visit on 19.  Doesn't like Dexcom, finds it confusing. Ran out of test strips yesterday as well.     Tobacco: No tobacco use  Alcohol: not currently using    Medication Adherence/Access: no issues reported  Adult foster home staff sets up and administers medications, except when pt leaves home. She      Sleep apnea: started using CPAP but mask is irritating and causing bruising on her face, has mask to tight. She was able to get new tubing. She feels tired, but states she is now wearing CPAP every night and not waking up overnight.     Diabetes: Pt currently taking lantus 60u at bedtime, novolog 25u usually 2-3 times a day with meals, and semaglutide 1mg weekly. Pt is not experiencing side effects.    SMB times daily.  Ranges (pt reported):   70-200s  Patient is experiencing hypoglycemia, symptoms dizziness and resolved with food. Reports 2x BS 60s.   Recent symptoms of high blood sugar? none  Eye exam: up to date  Foot exam: up to date  ACEi/ARB: No.   Urine Albumin:         Lab Results   Component Value Date     UMALCR 4.47 2019      Aspirin: Taking 81mg daily and denies side effects  Diet/Exercise: did not discuss today  Lab Results   Component Value Date    A1C 6.6 2019    A1C 7.6 05/15/2019    A1C 6.4 2018    A1C 6.4 2018    A1C 6.2 2018       Schizoaffective: Currently taking sertraline 200mg daily, Invega 9mg daily, benztropine 1mg BID, clonazepam 0.5mg HS.   -Reports mood is worsened. Initially denies visual hallucinations, but later admits to both auditory and visual hallucinations. See [facility] notes for details.   Side effects: both hands muscle jerking, especially in  the morning. Not bothersome.   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      Today's Vitals: LMP 01/06/2015   BP Readings from Last 1 Encounters:   09/10/19 114/60     Pulse Readings from Last 1 Encounters:   09/10/19 85     Wt Readings from Last 1 Encounters:   09/10/19 242 lb (109.8 kg)     Ht Readings from Last 1 Encounters:   09/10/19 5' (1.524 m)     Estimated body mass index is 47.26 kg/m  as calculated from the following:    Height as of an earlier encounter on 9/10/19: 5' (1.524 m).    Weight as of an earlier encounter on 9/10/19: 242 lb (109.8 kg).    Temp Readings from Last 1 Encounters:   09/10/19 98.6  F (37  C) (Oral)         ASSESSMENT:              Current medications were reviewed today as discussed above.      Medication Adherence: excellent, no issues identified    Sleep apnea: improved, PCP working with patient on adjustment of her mask.     Diabetes: needs improvement. A1c is at goal <7% and unable to assess SMBG today with limited ability of pt to report BS readings. Pt would benefit from continuing to try CGM, but needs additional support until she is able to use this on her own. Will also plan to cross train clinic RN to assist pt. She declined placement of new sensor today and will reschedule. She also did not have the transmitter with her today and encouraged her to bring to follow-up.  In the meantime, refilled test strips.     Schizoaffective: needs improvement. See [facility] notes, no medication changes.  Will follow patient closely.        PLAN:                  1. Schedule in 1 week to replace CGM  2. Restart SMBG TID, test strips sent to pharmacy    I spent 40 minutes with this patient today. All changes were made via collaborative practice agreement with Rowena Haas. A copy of the visit note was provided to the patient's primary care provider.     Will follow up in 1 week.    The patient was given a summary of these recommendations as an after visit summary.    Eugenia De Jesus, PharmD, BCACP

## 2019-09-10 NOTE — PROGRESS NOTES
SUBJECTIVE:                                                    Nena Tang is a 59 year old female who presents to clinic today for the following health issues:  MTM: Eugenia    Patient is struggling with her dexcom glucose monitoring equipment and reports that she does not care for this model and would like an alternative if possible.    Refill request: Test strips for blood glucose.     Diabetes Follow-up  Reporting some 60's for blood sugars and mostly in the 200. Struggling with the dexcom and needs help with set-up of the machine and the change every week.     How often are you checking your blood sugar? Four or more times daily.    What time of day are you checking your blood sugars (select all that apply)?  Before and after meals    Have you had any blood sugars above 200?  Yes     Have you had any blood sugars below 70?  Yes, dizziness.     What symptoms do you notice when your blood sugar is low?  Blurred vision and dizziness     What concerns do you have today about your diabetes? Other: Patient reports that her fingers on both left and right hand have been jerking more often then usual (Hands jerking in the morning possibly from Invega) Pateint reports time unknown, possibly a while. Patient also reports pain in shoulders and arms.      Do you have any of these symptoms? (Select all that apply)  No numbness or tingling in feet.  No redness, sores or blisters on feet.  No complaints of excessive thirst.  No reports of blurry vision.  No significant changes to weight.     Have you had a diabetic eye exam in the last 12 months? reports Eye exam coming up at the end of the month.     BP Readings from Last 2 Encounters:   08/09/19 135/76   08/06/19 114/72     Hemoglobin A1C (%)   Date Value   08/06/2019 6.6 (H)   05/15/2019 7.6 (H)     LDL Cholesterol Calculated (mg/dL)   Date Value   08/06/2019 81   08/06/2018 84       Diabetes Management Resources    Hypertension Follow-up  Patient denies any symptoms.      Do you check your blood pressure regularly outside of the clinic? No     Are you following a low salt diet? No    Are your blood pressures ever more than 140 on the top number (systolic) OR more   than 90 on the bottom number (diastolic), for example 140/90? No       Chronic Pain Follow-Up    Type / Location of Pain:back and shoulder    Analgesia/pain control:       Recent changes:  worse      Overall control: Inadequate pain control  Activity level/function:      Daily activities:  Unable to perform most daily activities - chores, hobbies, social activities, driving    Work:  Unable to work  Adverse effects:  No  Adherance    Taking medication as directed?  Yes    Participating in other treatments: Pt completed PT last week   Risk Factors:    Sleep:  Poor    Mood/anxiety:  Fair     Recent family or social stressors:  Stress     Other aggravating factors: prolonged standing  PHQ-9 SCORE 3/13/2018 10/26/2018 2019   PHQ-9 Total Score - - -   PHQ-9 Total Score - - -   PHQ-9 Total Score 14 20 22     TAMIA-7 SCORE 2/3/2017 3/13/2018 10/26/2018   Total Score - - -   Total Score 0 17 19   Total Score - - -     Encounter-Level CSA:    There are no encounter-level csa.     Patient-Level CSA:    There are no patient-level csa.           How many servings of fruits and vegetables do you eat daily?  0-1    On average, how many sweetened beverages do you drink each day (soda, juice, sweet tea, etc)?   1    How many days per week do you miss taking your medication? 0      Mental Health Follow up: Schizoaffective, PTSD    Status since last visit: Patient reports that she is struggling with increased hallucinations. This has been triggered by having her recently  sister's birthday come up. Patient denies any suicide thoughts or ideation.     See PHQ-9 for current symptoms.  Other associated symptoms: None    Complicating factors: issues with blood sugar monitoring. Patient is also anxious to go to the [facility] center  today.   Significant life event:  No   Current substance abuse:  None  Anxiety or Panic symptoms:  No    Sleep - has been using the CPAP regularly at bedtime. Taking clonazepam daily at bedtime.   Appetite - good.   Exercise - walking at the drop in center.     Denies smoking or any other substance use.     PHQ-9  English PHQ-9   Any Language               Problems taking medications regularly: No    Medication side effects: none    Diet: low fat/cholesterol and diabetic    Social History     Tobacco Use     Smoking status: Never Smoker     Smokeless tobacco: Never Used   Substance Use Topics     Alcohol use: No     Frequency: Never     Comment: last month        Problem list and histories reviewed & adjusted, as indicated.  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 (H)     Illiterate     Irritable bowel syndrome     overweight - BMI >35     Takotsubo cardiomyopathy     CAD (coronary artery disease)     Restless legs syndrome (RLS)     CINDY (obstructive sleep apnea)- mild AHI 10.3     Verbal auditory hallucination     Chronic low back pain     Schizoaffective disorder, depressive type (H)     Migraine headache     HTN, goal below 140/90     Health Care Home     Lumbago     Cervicalgia     Cocaine abuse, episodic (H)     Suicidal ideation     Esophageal reflux     Mild nonproliferative diabetic retinopathy (H)     Tardive dyskinesia     Alcohol use     Left cataract     Falls frequently     History of uterine cancer     Psychophysiological insomnia     Dysuria     Asymptomatic postmenopausal status     Abdominal pain, right lower quadrant     Infectious encephalopathy     Non-intractable vomiting with nausea     Thoughts of self harm     Gastroenteritis     Posttraumatic stress disorder     Cervical  cancer screening     Type 2 diabetes mellitus with stage 3 chronic kidney disease, with long-term current use of insulin (H)     Positive AIDA (antinuclear antibody)     Hyperglycemia     Pneumonia     Past Surgical History:   Procedure Laterality Date     C OOPHORECTORMY FOR MALIG, W/BX  1983    UTERINE     CATARACT IOL, RT/LT Bilateral 2017     COLONOSCOPY N/A 3/16/2017    Procedure: COLONOSCOPY;  Surgeon: Traci Gonzalez MD;  Location:  GI     Coronary CTA  5/21/2014     HYSTERECTOMY  1983    uterine cancer yearly pap's per provider.     LAPAROSCOPIC CHOLECYSTECTOMY  2008     PHACOEMULSIFICATION CLEAR CORNEA WITH STANDARD INTRAOCULAR LENS IMPLANT Left 5/5/2017    Procedure: PHACOEMULSIFICATION CLEAR CORNEA WITH STANDARD INTRAOCULAR LENS IMPLANT;  LEFT EYE PHACOEMULSIFICATION CLEAR CORNEA WITH STANDARD INTRAOCULAR LENS IMPLANT ;  Surgeon: Tyra Diaz MD;  Location:  [facility]     PHACOEMULSIFICATION CLEAR CORNEA WITH STANDARD INTRAOCULAR LENS IMPLANT Right 6/30/2017    Procedure: PHACOEMULSIFICATION CLEAR CORNEA WITH STANDARD INTRAOCULAR LENS IMPLANT;  RIGHT EYE PHACOEMULSIFICATION CLEAR CORNEA WITH STANDARD INTRAOCULAR LENS IMPLANT;  Surgeon: Tyra Diaz MD;  Location:  [facility]     RELEASE TRIGGER FINGER  10/11/2012    Left thumb. Procedure: RELEASE TRIGGER FINGER;  LEFT THUMB TRIGGER RELEASE;  Surgeon: Tay Langley MD;  Location:  [facility]     RELEASE TRIGGER FINGER Right 12/26/2016    Procedure: RELEASE TRIGGER FINGER;  Surgeon: Albino Castañeda MD;  Location:  OR       Social History     Tobacco Use     Smoking status: Never Smoker     Smokeless tobacco: Never Used   Substance Use Topics     Alcohol use: No     Frequency: Never     Comment: last month     Family History   Problem Relation Age of Onset     Cancer Mother         BLADDER     Respiratory Mother         COPD     Gastrointestinal Disease Mother         CIRRHOSIS OF LI BOLIVAR     Alcohol/Drug Mother      Diabetes Mother       Hypertension Mother      Lipids Mother      C.A.D. Mother      Glaucoma Mother      Alcohol/Drug Sister      Mental Illness Sister      Alcohol/Drug Sister      Psychotic Disorder Sister      Cancer Maternal Grandmother         UNKNOWN TYPE     Cancer Brother         COLON     Cancer - colorectal Brother         IN HIS LATE 30S     Alcohol/Drug Brother          OF HEROIN OVERDOSE AT AGE 22 YRS     Macular Degeneration No family hx of            Current Outpatient Medications   Medication Sig Dispense Refill     aspirin (ASA) 81 MG EC tablet TAKE 1 TABLET (81MG) BY MOUTH DAILY 30 tablet 3     atorvastatin (LIPITOR) 80 MG tablet TAKE 1 TABLET (80MG) BY MOUTH DAILY 30 tablet 11     benztropine (COGENTIN) 0.5 MG tablet Take 2 tablets (1 mg) by mouth 2 times daily 120 tablet 3     blood glucose (NO BRAND SPECIFIED) test strip Use to test blood sugar  4 times daily or as directed. To accompany: Blood Glucose Monitor Brands: per insurance. 100 strip 11     calcium carbonate (OS-ALLEN) 1500 (600 Ca) MG tablet Take 3 tablets (1,800 mg) by mouth daily 180 tablet 3     cholecalciferol (VITAMIN D3) 41840 units (1250 mcg) capsule TAKE 1 CAPSULE (50,000 UNITS) MONTHLY 4 capsule 3     clonazePAM (KLONOPIN) 0.5 MG tablet Take 1 tablet (0.5 mg) by mouth At Bedtime 30 tablet 0     diphenhydrAMINE (BENADRYL) 25 MG tablet Take 1 tablet in am and 1 tablet at 2 pm. Until Rash resolves 90 tablet 0     gabapentin (NEURONTIN) 300 MG capsule Take 3 capsules (900 mg) by mouth 3 times daily 270 capsule 1     guaiFENesin (ROBITUSSIN) 100 MG/5ML liquid Take 10 mLs (200 mg) by mouth nightly as needed for cough 118 mL 0     haloperidol (HALDOL) 10 MG tablet Take 5 mg by mouth 2 times daily as needed for agitation       ibuprofen (ADVIL/MOTRIN) 200 MG tablet Take 200 mg by mouth every 8 hours as needed for mild pain       insulin aspart (NOVOLOG FLEXPEN) 100 UNIT/ML pen Inject 20 Units Subcutaneous 3 times daily (with meals) Once daily, can add  additional 5 units if BGs are >500mg/dL.  Hold Insulin if BG are<70. 15 mL 11     insulin glargine (LANTUS SOLOSTAR PEN) 100 UNIT/ML pen Inject 60 Units Subcutaneous At Bedtime 15 mL 11     insulin pen needle (NOVOFINE 30) 30G X 8 MM miscellaneous USE 4 DAILY OR AS DIRECTED 400 each 0     ipratropium - albuterol 0.5 mg/2.5 mg/3 mL (DUONEB) 0.5-2.5 (3) MG/3ML neb solution Take 1 vial (3 mLs) by nebulization every 6 hours as needed for shortness of breath / dyspnea or wheezing 30 vial 0     lidocaine (LIDODERM) 5 % patch Place 1 patch onto the skin every 24 hours 10 patch 0     losartan (COZAAR) 50 MG tablet Take 1 tablet (50 mg) by mouth daily 90 tablet 3     melatonin 5 MG CAPS Take 2 capsules by mouth At Bedtime 180 capsule 3     metoprolol succinate ER (TOPROL-XL) 25 MG 24 hr tablet Take 1 tablet (25 mg) by mouth daily 90 tablet 1     nystatin (MYCOSTATIN) 463654 UNIT/GM external cream Apply topically 2 times daily 30 g 3     nystatin (MYCOSTATIN) 905602 UNIT/GM external powder Apply topically once as needed for dry skin 60 g 3     order for DME Equipment being ordered: CPAP Supplies. 1 each 0     order for DME Equipment being ordered: Depends. 30 each 1     order for DME Equipment being ordered: Freestyle Gabby Franklin 1 Device 0     paliperidone ER (INVEGA) 9 MG 24 hr tablet Take 1 tablet (9 mg) by mouth every morning 30 tablet 2     polyethylene glycol (MIRALAX) powder Take 17 g (1 capful) by mouth daily as needed for constipation 500 g 3     ranitidine (ZANTAC) 300 MG tablet TAKE 1 TABLET (300MG) BY MOUTH AT BEDTIME 90 tablet 1     semaglutide (OZEMPIC) 1 MG/DOSE pen Inject 1 mg Subcutaneous every 7 days 6 mL 5     sertraline (ZOLOFT) 100 MG tablet Take 2 tablets (200 mg) by mouth daily 60 tablet 3     topiramate (TOPAMAX) 50 MG tablet Take 1.5 tablets (75 mg) by mouth 2 times daily 270 tablet 0     Allergies   Allergen Reactions     Imidazole Antifungals Hives     Tolerates diflucan     Ketoprofen Itching      Pruritis to topical     Lisinopril Hives     Metformin Other (See Comments)     Patient hospitalized for lactic acidosis - admitting provider suspectd caused by metformin     Metronidazole Hives     Posaconazole Hives     Tolerates diflucan     Recent Labs   Lab Test 08/06/19  1043 07/31/19  0619 07/31/19  0003 07/30/19  0710  07/28/19  1141 05/29/19  2226 05/24/19  0916  05/15/19  1822  12/20/18  1532  08/06/18  1038  06/27/17  1358   A1C 6.6*  --   --   --   --   --   --   --   --  7.6*  --  6.4*  --  6.4*   < > 7.0*   LDL 81  --   --   --   --   --   --   --   --   --   --   --   --  84  --  64   HDL 39*  --   --   --   --   --   --   --   --   --   --   --   --  46*  --  37*   TRIG 131  --   --   --   --   --   --   --   --   --   --   --   --  215*  --  267*   ALT  --   --   --   --   --  23 32 30  --  33   < >  --    < >  --    < > 32   CR  --  0.92 0.84 0.93   < > 1.13* 0.89 0.87   < > 0.93   < > 0.99   < >  --    < > 0.78   GFRESTIMATED  --  68 76 68   < > 53* 71 73   < > 67   < > 63   < >  --    < > 76   GFRESTBLACK  --  79 88 79   < > 62 82 85   < > 78   < > 73   < >  --    < > >90   GFR Calc     POTASSIUM  --  4.2  --  4.5   < > 4.1 4.0 4.0   < > 4.3   < > 4.2   < >  --    < > 4.3   TSH  --   --   --   --   --   --   --  1.62  --   --   --  1.98  --   --    < >  --     < > = values in this interval not displayed.      BP Readings from Last 3 Encounters:   08/09/19 135/76   08/06/19 114/72   07/31/19 (!) 146/66    Wt Readings from Last 3 Encounters:   08/09/19 106.6 kg (235 lb)   08/06/19 106.1 kg (234 lb)   07/31/19 104.1 kg (229 lb 9.6 oz)        Labs reviewed in EPIC  Problem list, Medication list, Allergies, and Medical/Social/Surgical histories reviewed in [facility] and updated as appropriate.     ROS: Constitutional, neuro, ENT, endocrine, pulmonary, cardiac, gastrointestinal, genitourinary, musculoskeletal, integument and psychiatric systems are negative, except as otherwise noted above  "in the HPI.   OBJECTIVE:                                                    /60   Pulse 85   Temp 98.6  F (37  C) (Oral)   Resp 16   Ht 1.524 m (5')   Wt 109.8 kg (242 lb)   LMP 01/06/2015   SpO2 92%   BMI 47.26 kg/m    Body mass index is 47.26 kg/m .    GENERAL: over weight, pale and fatigued  EYES: Eyes grossly normal to inspection, PERRL and conjunctivae and sclerae normal  RESP: lungs clear to auscultation - no rales, rhonchi or wheezes  CV: regular rates and rhythm, normal S1 S2, no S3 or S4 and no murmur, click or rub  MS: no gross musculoskeletal defects noted, no edema  NEURO: Normal strength and tone, mentation intact and speech normal    Mental Status Assessment:  Appearance:   Appropriate   Eye Contact:   Good   Psychomotor Behavior: Retarded (Slowed)   Attitude:   Cooperative   Orientation:   All  Speech   Rate / Production: Normal  Slow    Volume:  Normal   Mood:    Depressed  Sad   Affect:    Lethargic  Subdued  Worrisome   Thought Content:  Clear   Thought Form:  Coherent  Logical   Insight:    Fair   Attention Span and Concentration:  appropriate.   Recent and Remote Memory:  intact  Fund of Knowledge: appropriate  Muscle Strength and Tone: normal   Suicidal Ideation: reports no thoughts, no intention  Hallucination: Yes  Paranoid-No  Self harm-No    See [facility] notes     Diagnostic Test Results:  none      ASSESSMENT/PLAN:                                                    (F25.1) Schizoaffective disorder, depressive type (H)  (primary encounter diagnosis)  (R44.0) Verbal auditory hallucination   (R45.851) Suicidal ideation   (R45.89) Thoughts of self harm  Comment: Patient reporting increased hallucinations that are causing her increased stress and anxiety. Patient is concerned that she is not getting enough sleep although she notes that she has not been seeing the \"man\" in her sleep or feeling like she is getting choked with the CPAP on. Patient denies any suicidal thoughts or ideation " or any thoughts of self-harm.   Plan: Continue with current medications as scheduled.   -Schedule individual therapy with Richardson GORMAN     (E11.0719,  Z79.4) Type 2 diabetes mellitus with mild nonproliferative retinopathy without macular edema, with long-term current use of insulin, unspecified laterality (H)  Comment: Patient is struggling with her new blood glucose monitoring equipment. Patient states that she wants another option. Eugenia(MTDELROY) discussed with patient about helping her set-up the machine and with the weekly changes until she is more familiar with the new monitor. Patient seems to be more agreeable to this plan. Patient left all her equipment at the clinic with us until next week as she was heading to the drop in center after this appointment.   Plan: blood glucose (NO BRAND SPECIFIED) test strip        -Schedule an appointment with ABHISHEK next week.     (I10) HTN, goal below 140/90  Comment: Patient denies any headaches, chest pain, shortness of breath or any syncopal episodes.   BP Readings from Last 3 Encounters:   09/14/19 (!) 147/79   09/10/19 114/60   08/09/19 135/76     Plan: continue with losartan, metoprolol, atorvastatin and aspirin.     Patient Instructions   -Loosen up the CPAP mask.   -Call clinic with questions or concerns.   -Please have them fax the blood sugar report from the home.   -Appointment with Eugenia scheduled for next Wednesday 9/18/2019. Bring old sensor to the appointment.       I spent Greater than 40 minutes was spent face to face with Nena Tang, with greater than 50 % in counselling and consultation about diabetes, mental health and hypertension.       [facility] PRIMARY [facility]

## 2019-09-11 RX ORDER — BLOOD-GLUCOSE SENSOR
1 EACH MISCELLANEOUS
COMMUNITY
End: 2020-05-18 | Stop reason: ALTCHOICE

## 2019-09-11 RX ORDER — BLOOD-GLUCOSE TRANSMITTER
1 EACH MISCELLANEOUS
COMMUNITY
End: 2020-05-18 | Stop reason: ALTCHOICE

## 2019-09-13 ENCOUNTER — TELEPHONE (OUTPATIENT)
Dept: OPHTHALMOLOGY | Facility: CLINIC | Age: 59
End: 2019-09-13

## 2019-09-14 ENCOUNTER — HOSPITAL ENCOUNTER (EMERGENCY)
Facility: CLINIC | Age: 59
Discharge: HOME | End: 2019-09-14
Attending: EMERGENCY MEDICINE | Admitting: EMERGENCY MEDICINE
Payer: MEDICARE

## 2019-09-14 ENCOUNTER — APPOINTMENT (OUTPATIENT)
Dept: CT IMAGING | Facility: CLINIC | Age: 59
End: 2019-09-14
Attending: EMERGENCY MEDICINE
Payer: MEDICARE

## 2019-09-14 ENCOUNTER — APPOINTMENT (OUTPATIENT)
Dept: GENERAL RADIOLOGY | Facility: CLINIC | Age: 59
End: 2019-09-14
Attending: EMERGENCY MEDICINE
Payer: MEDICARE

## 2019-09-14 VITALS
SYSTOLIC BLOOD PRESSURE: 147 MMHG | RESPIRATION RATE: 14 BRPM | OXYGEN SATURATION: 95 % | HEART RATE: 80 BPM | DIASTOLIC BLOOD PRESSURE: 79 MMHG | TEMPERATURE: 97.7 F

## 2019-09-14 DIAGNOSIS — R41.0 TRANSIENT CONFUSION: ICD-10-CM

## 2019-09-14 LAB
ALBUMIN SERPL-MCNC: 3.4 G/DL (ref 3.4–5)
ALBUMIN UR-MCNC: NEGATIVE MG/DL
ALP SERPL-CCNC: 88 U/L (ref 40–150)
ALT SERPL W P-5'-P-CCNC: 27 U/L (ref 0–50)
ANION GAP SERPL CALCULATED.3IONS-SCNC: 2 MMOL/L (ref 3–14)
APPEARANCE UR: CLEAR
AST SERPL W P-5'-P-CCNC: 36 U/L (ref 0–45)
BACTERIA #/AREA URNS HPF: ABNORMAL /HPF
BASE DEFICIT BLDV-SCNC: 2.8 MMOL/L
BASOPHILS # BLD AUTO: 0.1 10E9/L (ref 0–0.2)
BASOPHILS NFR BLD AUTO: 0.7 %
BILIRUB DIRECT SERPL-MCNC: <0.1 MG/DL (ref 0–0.2)
BILIRUB SERPL-MCNC: 0.3 MG/DL (ref 0.2–1.3)
BILIRUB UR QL STRIP: NEGATIVE
BUN SERPL-MCNC: 19 MG/DL (ref 7–30)
CALCIUM SERPL-MCNC: 9.2 MG/DL (ref 8.5–10.1)
CHLORIDE SERPL-SCNC: 109 MMOL/L (ref 94–109)
CO2 SERPL-SCNC: 27 MMOL/L (ref 20–32)
COLOR UR AUTO: ABNORMAL
CREAT SERPL-MCNC: 0.9 MG/DL (ref 0.52–1.04)
DIFFERENTIAL METHOD BLD: ABNORMAL
EOSINOPHIL # BLD AUTO: 0.1 10E9/L (ref 0–0.7)
EOSINOPHIL NFR BLD AUTO: 2 %
ERYTHROCYTE [DISTWIDTH] IN BLOOD BY AUTOMATED COUNT: 14.2 % (ref 10–15)
ETHANOL SERPL-MCNC: <0.01 G/DL
GFR SERPL CREATININE-BSD FRML MDRD: 70 ML/MIN/{1.73_M2}
GLUCOSE BLDC GLUCOMTR-MCNC: 120 MG/DL (ref 70–99)
GLUCOSE BLDC GLUCOMTR-MCNC: 72 MG/DL (ref 70–99)
GLUCOSE BLDC GLUCOMTR-MCNC: 90 MG/DL (ref 70–99)
GLUCOSE SERPL-MCNC: 116 MG/DL (ref 70–99)
GLUCOSE UR STRIP-MCNC: NEGATIVE MG/DL
HCO3 BLDV-SCNC: 25 MMOL/L (ref 21–28)
HCT VFR BLD AUTO: 29.3 % (ref 35–47)
HGB BLD-MCNC: 9.2 G/DL (ref 11.7–15.7)
HGB UR QL STRIP: NEGATIVE
IMM GRANULOCYTES # BLD: 0.1 10E9/L (ref 0–0.4)
IMM GRANULOCYTES NFR BLD: 1 %
KETONES UR STRIP-MCNC: NEGATIVE MG/DL
LEUKOCYTE ESTERASE UR QL STRIP: NEGATIVE
LYMPHOCYTES # BLD AUTO: 2.3 10E9/L (ref 0.8–5.3)
LYMPHOCYTES NFR BLD AUTO: 32.3 %
MCH RBC QN AUTO: 29.9 PG (ref 26.5–33)
MCHC RBC AUTO-ENTMCNC: 31.4 G/DL (ref 31.5–36.5)
MCV RBC AUTO: 95 FL (ref 78–100)
MONOCYTES # BLD AUTO: 0.7 10E9/L (ref 0–1.3)
MONOCYTES NFR BLD AUTO: 9.2 %
MUCOUS THREADS #/AREA URNS LPF: PRESENT /LPF
NEUTROPHILS # BLD AUTO: 3.9 10E9/L (ref 1.6–8.3)
NEUTROPHILS NFR BLD AUTO: 54.8 %
NITRATE UR QL: NEGATIVE
NRBC # BLD AUTO: 0 10*3/UL
NRBC BLD AUTO-RTO: 0 /100
O2/TOTAL GAS SETTING VFR VENT: ABNORMAL %
OXYHGB MFR BLDV: 50 %
PCO2 BLDV: 51 MM HG (ref 40–50)
PH BLDV: 7.29 PH (ref 7.32–7.43)
PH UR STRIP: 6.5 PH (ref 5–7)
PLATELET # BLD AUTO: 186 10E9/L (ref 150–450)
PO2 BLDV: 27 MM HG (ref 25–47)
POTASSIUM SERPL-SCNC: 4.7 MMOL/L (ref 3.4–5.3)
PROT SERPL-MCNC: 7.8 G/DL (ref 6.8–8.8)
RBC # BLD AUTO: 3.08 10E12/L (ref 3.8–5.2)
RBC #/AREA URNS AUTO: 1 /HPF (ref 0–2)
SODIUM SERPL-SCNC: 138 MMOL/L (ref 133–144)
SOURCE: ABNORMAL
SP GR UR STRIP: 1.01 (ref 1–1.03)
SQUAMOUS #/AREA URNS AUTO: 4 /HPF (ref 0–1)
TRANS CELLS #/AREA URNS HPF: <1 /HPF (ref 0–1)
TROPONIN I SERPL-MCNC: <0.015 UG/L (ref 0–0.04)
UROBILINOGEN UR STRIP-MCNC: NORMAL MG/DL (ref 0–2)
WBC # BLD AUTO: 7.2 10E9/L (ref 4–11)
WBC #/AREA URNS AUTO: 1 /HPF (ref 0–5)

## 2019-09-14 PROCEDURE — 80076 HEPATIC FUNCTION PANEL: CPT | Performed by: EMERGENCY MEDICINE

## 2019-09-14 PROCEDURE — 25000128 H RX IP 250 OP 636: Performed by: EMERGENCY MEDICINE

## 2019-09-14 PROCEDURE — 80320 DRUG SCREEN QUANTALCOHOLS: CPT | Performed by: EMERGENCY MEDICINE

## 2019-09-14 PROCEDURE — 84484 ASSAY OF TROPONIN QUANT: CPT | Performed by: EMERGENCY MEDICINE

## 2019-09-14 PROCEDURE — 85025 COMPLETE CBC W/AUTO DIFF WBC: CPT | Performed by: EMERGENCY MEDICINE

## 2019-09-14 PROCEDURE — 36415 COLL VENOUS BLD VENIPUNCTURE: CPT | Performed by: EMERGENCY MEDICINE

## 2019-09-14 PROCEDURE — 00000146 ZZHCL STATISTIC GLUCOSE BY METER IP

## 2019-09-14 PROCEDURE — 71046 X-RAY EXAM CHEST 2 VIEWS: CPT

## 2019-09-14 PROCEDURE — 82805 BLOOD GASES W/O2 SATURATION: CPT | Performed by: EMERGENCY MEDICINE

## 2019-09-14 PROCEDURE — 96361 HYDRATE IV INFUSION ADD-ON: CPT

## 2019-09-14 PROCEDURE — 70450 CT HEAD/BRAIN W/O DYE: CPT

## 2019-09-14 PROCEDURE — 93005 ELECTROCARDIOGRAM TRACING: CPT

## 2019-09-14 PROCEDURE — 99285 EMERGENCY DEPT VISIT HI MDM: CPT | Mod: 25

## 2019-09-14 PROCEDURE — 96360 HYDRATION IV INFUSION INIT: CPT

## 2019-09-14 PROCEDURE — 80048 BASIC METABOLIC PNL TOTAL CA: CPT | Performed by: EMERGENCY MEDICINE

## 2019-09-14 PROCEDURE — 81001 URINALYSIS AUTO W/SCOPE: CPT | Mod: XU | Performed by: EMERGENCY MEDICINE

## 2019-09-14 RX ADMIN — SODIUM CHLORIDE 500 ML: 9 INJECTION, SOLUTION INTRAVENOUS at 21:11

## 2019-09-14 RX ADMIN — SODIUM CHLORIDE 500 ML: 9 INJECTION, SOLUTION INTRAVENOUS at 19:15

## 2019-09-14 ASSESSMENT — ENCOUNTER SYMPTOMS
FATIGUE: 1
CONFUSION: 1
FEVER: 0
BACK PAIN: 0
NUMBNESS: 0

## 2019-09-14 NOTE — ED AVS SNAPSHOT
[facility] Emergency Department  [address]  [facility] [address]  Phone:  [phone]  Fax:  [phone]                                    Nena Tang   MRN: [id number]    Department:  [facility] Emergency Department   Date of Visit:  9/14/2019           After Visit Summary Signature Page    I have received my discharge instructions, and my questions have been answered. I have discussed any challenges I see with this plan with the nurse or doctor.    ..........................................................................................................................................  Patient/Patient Representative Signature      ..........................................................................................................................................  Patient Representative Print Name and Relationship to Patient    ..................................................               ................................................  Date                                   Time    ..........................................................................................................................................  Reviewed by Signature/Title    ...................................................              ..............................................  Date                                               Time          22EPIC Rev 08/18

## 2019-09-14 NOTE — ED PROVIDER NOTES
"  History     Chief Complaint:  Altered Mental Status    HPI   Nena Tang is a 59 year old diabetic female, with complicated past medical history as noted below including MI, uterine cancer, pneumonia, cocaine abuse, CAD, hyperglycemia, hyperlipidemia, hypertension amongst others, who presents with her caregiver from half-way for evaluation of altered mental status. Per caregiver, patient was more short of breath and wheezy yesterday beyond baseline and today, while at dinner tonight, patient began to have \"making jerky movements while holding her spoon\", though patient was conscious while doing this. Patient also seemed to be \"more spacey\", taking longer to answer questions and seemed more confused. Due to this, patient was presented.     Here, caregiver states that during triage, patient as unable to say her name which was abnormal for her. Patient states she feels more tired and has blurry vision. She denies any new upper back pain, new numbness or tingling. She states her sugars have been a little high recently. Caregiver denies any fever or gait problem.     Allergies:  Imidazole antifungals  Ketoprofen  Lisinopril  Metformin  Metronidazole  Posaconazole     Medications:    Aspirin 81 mg  Lipitor  Cogentin  Klonopin  Benadryl  Neurontin  Robitussin  Haldol  Ibuprofen  Novolog flexpen  Lantus solostar  Duoneb solution  Lidoderm patch  Cozaar  Melatonin  Metoprolol  Mycostatin  Invega  Miralax powder  Zantac  Ozempic pen  Zoloft  Topamax    Past Medical History:    Acute respiratory failure with hypoxia  Asymptomatic postmenopausal status  CAD  Chronic low back pain  Cocaine abuse, in remission  Dysuria  Esophageal Reflux  Falls frequently  Fecal urgency  Gastroenteritis  History of heroin abuse  Hyperglycemia  Hyperlipidemia  Hypertension  Illiterate  Infection encephalopathy  Irritable bowel syndrome  Left cataract  Migraine  Mild nonproliferative diabetic retinopathy  Moderate major " "depression  Noncompliance with medication regimen  Non-intractable vomiting with nausea  Obesity  CINDY  Osteopenia  Pneumonia of right lower lobe due to infectious organism  Positive AIDA  Posttraumatic stress disorder  Psychophysiological Insomnia  Schizoaffective disorder, depressive dype  Sepsis  Suicidal intent  Takotsubo cardiomyopathy  Tardive dyskinesia  Type 2 diabetes mellitus  Uterine cancer  Verbal auditory hallucinations    Past Surgical History:    Oophorectomy  Cataract  Colonoscopy  Coronary CTA  Hysterectomy  Cholecystectomy  Phacoemulsification clear cornea with standard intraocular lens implant - bilateral  Release trigger finger x2    Family History:    Mother - bladder, cancer, COPD, cirrhosis, alcohol/drug, diabetes, hypertension, lipids, CAD  Sister(s) - Alcohol/drug, mental illness, alcohol/drug, psychotic disorder  Brother(s) - colon cancer, colorectal cancer, alcohol/drug    Social History:  The patient was accompanied to the ED by caregiver.  Smoking Status: No  Smokeless Tobacco: No  Alcohol Use: Yes  Drug Use: Former   Marital Status:   [5]     Review of Systems   Constitutional: Positive for fatigue. Negative for fever.   Musculoskeletal: Negative for back pain and gait problem.   Neurological: Negative for numbness.        \"Jerky movements\"   Psychiatric/Behavioral: Positive for confusion.   All other systems reviewed and are negative.        Physical Exam   Vitals:  Patient Vitals for the past 24 hrs:   BP Temp Temp src Pulse Heart Rate Resp SpO2   09/14/19 2140 -- -- -- -- 84 14 95 %   09/14/19 2135 -- -- -- -- 83 11 94 %   09/14/19 2130 (!) 147/79 -- -- 80 80 22 96 %   09/14/19 2100 -- -- -- -- 79 19 94 %   09/14/19 2055 -- -- -- -- 79 20 95 %   09/14/19 2050 (!) 142/71 -- -- 81 82 20 94 %   09/14/19 1955 -- -- -- -- 81 14 91 %   09/14/19 1950 -- -- -- -- 81 20 93 %   09/14/19 1945 116/69 -- -- 80 80 11 93 %   09/14/19 1930 110/85 -- -- 82 80 17 95 %   09/14/19 1827 (!) 143/94 " 97.7  F (36.5  C) Oral 84 -- 20 92 %      Physical Exam    Vital signs and nursing notes reviewed.     Constitutional: laying on gurney appears comfortable  HENT: Oropharynx is clear and moist  Eyes: Conjunctivae are normal bilaterally. Pupils equal round and reactive to light.  Neck: normal range of motion  Cardiovascular: Normal rate, regular rhythm, normal heart sounds.   Pulmonary/Chest: Effort normal and breath sounds normal. No respiratory distress.   Abdominal: Soft. Bowel sounds are normal. No tenderness to palpation. No rebound or guarding.   Musculoskeletal: No joint swelling or edema.   Neurological: Alert and oriented to me.  No focal weakness or extremities. Appears fatigued.  No reported paresthesias.  No slurred speech or overt encephalopathy  Skin: Skin is warm and dry. No rash noted.   Psych: normal affect   Emergency Department Course     ECG:  ECG taken at 1850, ECG read at 1853 by Dr. Ryan Carroll MD  Normal sinus rhythm  Left axis deviation  Nonspecific T wave abnormality  Abnormal ECG  Rate 81 bpm. NH interval 176. QRS duration 84. QT/QTc 370/429. P-R-T axes 42 -47 -7.     Imaging:  Radiology findings were communicated with the patient and caregiver who voiced understanding of the findings.  Chest XR, PA & LAT:  IMPRESSION: Negative chest.  Reading per radiology.    CT Head w/o Contrast:  IMPRESSION:  1.  No acute intracranial process or significant interval change compared to 05/15/2019.  Reading per radiology.     Laboratory:  Laboratory findings were communicated with the patient and caregiver who voiced understanding of the findings.  CBC: HGB 9.2 (L) o/w WNL (WBC 7.2, )  BMP: Anion Gap 2 (L), Glucose 116 (H) o/w WNL (Creatinine 0.90)  Troponin (Collected 1915): <0.015  Glucose by Meter (Collected 1827): 120 (H)   Glucose by Meter (Collected 1915): 116 (H)  Glucose by Meter (Collected 2126): 72    Hepatic Panel: AWNL  Alcohol Level Blood: <0.01  Blood Gas Venous and Oxyhgb: pH  "Venous 7.29 (L), pCo2 Venous 51 (H) o/w WNL    UA with Microscopic: Bacteria Few (A), Squamous Epithelial/HPF Urine 4 (H), Mucous Urine Present (A) o/w WNL     Interventions:  1915 0.9% NaCl Bolus 500 mL IV  2111 0.9% NaCl Bolus 500 mL IV     Emergency Department Course:  Nursing notes and vitals reviewed.  EKG obtained in the ED, see results above.   IV was inserted and blood was drawn for laboratory testing, results above.  The patient was sent for a Chest XR, PA & LAT, CT Head w/o Contrast while in the emergency department, results above.    The patient provided a urine sample here in the emergency department. This was sent for laboratory testing, findings above.     (1822)   I performed an exam of the patient as documented above. History obtained from caregiver.     (1827)   .     (2001)   Updated by RN that she began to ask questions like \"who are you and where am I\", but no focal neurologic deficits.    (2107)   Updated patient/caregiver regarding results. Discussed plan of care. Will road test patient and if she passes, patient will be discharged home.     I discussed the treatment plan with the patient and caregiver. They expressed understanding of this plan and consented to discharge. They will be discharged home with instructions for care and follow up. In addition, the patient will return to the emergency department if their symptoms persist, worsen, if new symptoms arise or if there is any concern.  All questions were answered.     I personally reviewed the laboratory results with the Patient and caregiver and answered all related questions prior to discharge.    Impression & Plan      Medical Decision Making:  Patient is a 59 year old female living at a group home who presents as the caregiver says she was having seemingly intermittent episodes of confusion today. There has been no reported fevers, vomiting, diarrhea, pain complaints that are new etc,. On evaluation, she had no signs of focal " "weakness, slurred speech and was alert and oriented to time and person and place on my examination. She does have several medical conditions therefore lab tests were obtained which no significant abnormality. She does appear to have fairly chronic anemia. Her CT of her head was unremarkable and chest x-ray shows no acute findings. She was given IV fluids and observed here in the emergency department for over 3 hours. She had one transient episode of asking the nurse \"who are you\" and \"where am I\", but since then, with multiple re-evaluations by myself, she's been alert and interactive. She ambulated in the emergency department without difficulty. I do not feel patient is having a TIA or stroke and there is no indication for MRI imaging. There is no suggestion of infectious etiology. She does have likely pickwickian syndrome through breathing. She has had slight respiratory acidosis, but she has no dyspnea or hypoxia in the emergency department. She ate a sandwich here in the emergency department, drinking fluids. There is no clear indication that she needs to be admitted or needs further testing at this time. Felt that she would be safe for discharge home with close monitoring. If she should have any worsening symptoms, she should return here. She will be discharged back home to her group home facility.      Diagnosis:    ICD-10-CM   1. Transient confusion R41.0            Disposition:   Discharged.    Scribe Disclosure:  I, Christin Jim, am serving as a scribe at 6:24 PM on 9/14/2019 to document services personally performed by Ryan Carroll MD, based on my observations and the provider's statements to me.  9/14/2019   [facility] EMERGENCY DEPARTMENT       Ryan Carroll MD  09/15/19 1624    "

## 2019-09-14 NOTE — ED TRIAGE NOTES
Patient brought in by caregiver - confusion and shaking. Patient unable to say name when checking in to ED. Caregiver states she was confused starting at 1700.   ABC intact alert and no distress.

## 2019-09-15 NOTE — ED NOTES
"RN performed rounds on Pt. Pt stated, \"Who are you? Where am I\" RN re-oriented Pt. No acute distress noted. MD notified. Will continue to monitor and assess  "

## 2019-09-16 ENCOUNTER — DOCUMENTATION ONLY (OUTPATIENT)
Dept: CARE COORDINATION | Facility: CLINIC | Age: 59
End: 2019-09-16

## 2019-09-16 DIAGNOSIS — F25.0 SCHIZOAFFECTIVE DISORDER, BIPOLAR TYPE (H): ICD-10-CM

## 2019-09-16 LAB — INTERPRETATION ECG - MUSE: NORMAL

## 2019-09-16 RX ORDER — CLONAZEPAM 0.5 MG/1
0.5 TABLET ORAL AT BEDTIME
Qty: 30 TABLET | Refills: 0 | Status: SHIPPED | OUTPATIENT
Start: 2019-09-16 | End: 2019-10-17

## 2019-09-16 NOTE — TELEPHONE ENCOUNTER
Requested Prescriptions   Pending Prescriptions Disp Refills     clonazePAM (KLONOPIN) 0.5 MG tablet 30 tablet 0     Sig: Take 1 tablet (0.5 mg) by mouth At Bedtime   Last Written Prescription Date:  8/15/19  Last Fill Quantity: 30,  # refills: 0  Last office visit: No previous visit found with prescribing provider:   Future Office Visit:   Next 5 appointments (look out 90 days)    Sep 18, 2019 10:00 AM CDT  SHORT with Eugenia De Jesus [facility] ([facility]) [address] [place] [address]  [address]  [facility] [address]  [phone]   Sep 25, 2019  3:30 PM CDT  Return Visit with Richardson Lopez [facility] ([facility]) [address]  [address]  [facility] [address]  [phone]   Sep 25, 2019  4:30 PM CDT  Return Visit with Reginald Brothers MD  [facility] ([facility]) [address]  [facility] [address]  [phone]   Oct 11, 2019  1:00 PM CDT  Return Visit with ART Arias CNP  [facility] ([facility]) [address]  [address]  [facility] [address]  [phone]   Oct 11, 2019  1:00 PM CDT  [facility]  [facility] ([facility]) [address]  [address] [place] [address]  [phone]             There is no refill protocol information for this order

## 2019-09-16 NOTE — TELEPHONE ENCOUNTER
Last script in [place]  was written by Dr. Carr and was filled on 8/15/19 . Routed to PCP for review. Sakshi Viera RN September 16, 2019 3:35 PM

## 2019-09-18 ENCOUNTER — OFFICE VISIT (OUTPATIENT)
Dept: PHARMACY | Facility: CLINIC | Age: 59
End: 2019-09-18
Payer: COMMERCIAL

## 2019-09-18 DIAGNOSIS — G47.33 OSA (OBSTRUCTIVE SLEEP APNEA): ICD-10-CM

## 2019-09-18 DIAGNOSIS — E11.65 TYPE 2 DIABETES MELLITUS WITH HYPERGLYCEMIA, WITH LONG-TERM CURRENT USE OF INSULIN (H): ICD-10-CM

## 2019-09-18 DIAGNOSIS — F25.1 SCHIZOAFFECTIVE DISORDER, DEPRESSIVE TYPE (H): Primary | ICD-10-CM

## 2019-09-18 DIAGNOSIS — Z79.4 TYPE 2 DIABETES MELLITUS WITH HYPERGLYCEMIA, WITH LONG-TERM CURRENT USE OF INSULIN (H): ICD-10-CM

## 2019-09-18 PROCEDURE — 99607 MTMS BY PHARM ADDL 15 MIN: CPT | Performed by: PHARMACIST

## 2019-09-18 PROCEDURE — 99606 MTMS BY PHARM EST 15 MIN: CPT | Performed by: PHARMACIST

## 2019-09-18 NOTE — PROGRESS NOTES
"SUBJECTIVE/OBJECTIVE:                Nena Tang is a 59 year old female coming in for a follow-up visit for Medication Therapy Management.  She was referred to me from Rowena Haas.     Chief Complaint: Follow up from our visit on 9/10/19.  \"I don't feel right\".  Scheduled today to apply Dexcom sensor.    Tobacco: No tobacco use  Alcohol: not currently using    Medication Adherence/Access: no issues reported  Adult foster home staff sets up and administers medications, except when pt leaves home.      Sleep apnea: Continues to use CPAP but mask continues to be problematic. Complains of issues with air leakage if she loosens the mask but bruising and skin breakdown if she tightens it. She has been asking the foster home RN to help her fit the mask.     Schizoaffective: Currently taking sertraline 200mg daily, Invega 9mg daily, benztropine 1mg BID, clonazepam 0.5mg HS. Does not take any Haldol PRN.   -Reports mood is unchanged. Hallucinations are unchanged. She does not feel that she is at the point of hospitalization. She has mostly been bothered by medication side effects, including reduced alertness and worsening muscle jerks and had been sent to the emergency room by foster home staff for these concerns on 9/14. Nena does not remember going to the ED but she remembers asking the staff \"where am I\" at one point.   Side effects: both hands muscle jerking, especially in the morning. Bothered that it interferes with holding cups and utensils to eat. She demonstrates the tremor in her hands at the visit, which resolves when she is distracted.   She has a therapist at the drop-in center.  Has joined a grief group that meets on Sundays and she is enjoying the additional support.  Followed by psychiatrist Dr Brothers at [facility] and appt scheduled in 1 week.   Per [facility] records psychiatrist Nuvia 2/2018:  Psychiatric Medication Trials      Zoloft (sertraline) and Celexa (citalopram)  Zyprexa (olanzapine), Seroquel " (quetiapine) and Abilify (aripriprazole)  Haldol (haloperidol)  Ambien (zolpidem)      Diabetes: pt brought in the remaining boxes of Dexcom supplies and two sensors, however she does not have the transmitter. States blood sugars have been unchanged and no acute concerns today.       Today's Vitals: LMP 01/06/2015       ASSESSMENT:              Current medications were reviewed today as discussed above.      Medication Adherence: good, no issues identified    Sleep apnea: improved but continues to have difficulty with her mask. Discussed with Wendy garcia RN and she will reach out to pt to have pt bring into clinic to assistance with the fit.     Schizoaffective: needs improvement. Possible her side effects are result from polypharmacy with Invega, gabapentin, clonazepam, sertraline. Will discuss with psychiatry - considering she continues to have hallucinations, would keep Invega at the same dose. Clonazepam has been very effective for her nighttime hallucination at a low dose. Consider reduction in gabapentin.     Diabetes: needs improvement. Called Dexcom  and unable to replace transmitter without pt paying out of pocket for a new one. Se can also wait for another two months until able to refill the transmitter. Will contact pt with this information and encourage her to continue looking for the missing transmitter.       PLAN:                  1. Called Dexcom , pt unable to get a replacement transmitter without paying full price at this time.   2. Discussed CPAP mask fit with RN and will ask pt to bring the mask into the clinic for assistance at follow-up  3. Consider a dose reduction in gabapentin to 600mg TID - will discuss with psychiatry    I spent 40 minutes with this patient today. All changes were made via collaborative practice agreement with Rowena Haas. A copy of the visit note was provided to the patient's primary care provider.     Will follow up in 2-4 weeks.    The  patient was given a summary of these recommendations as an after visit summary.    Eugenia De Jesus, PharmD, BCACP

## 2019-09-18 NOTE — PATIENT INSTRUCTIONS
If you are having more hallucinations and feel like you need a medicine to help calm them down, please ask for Haldol - they should have that prescription at the house for you to take as needed.    Follow-up with Dr Brothers psychiatrist in 1 week.    I will call about the transmitter chip for your glucose monitor, then get you scheduled to come back in.    I'll ask about who to help with the sleep apnea mask issues.    Eugenia De Jesus, PharmD, BCACP

## 2019-09-23 ENCOUNTER — HOSPITAL ENCOUNTER (EMERGENCY)
Facility: CLINIC | Age: 59
Discharge: HOME | End: 2019-09-23
Attending: EMERGENCY MEDICINE | Admitting: EMERGENCY MEDICINE
Payer: MEDICARE

## 2019-09-23 VITALS
TEMPERATURE: 97.6 F | WEIGHT: 230 LBS | BODY MASS INDEX: 44.92 KG/M2 | RESPIRATION RATE: 32 BRPM | DIASTOLIC BLOOD PRESSURE: 77 MMHG | SYSTOLIC BLOOD PRESSURE: 157 MMHG | HEART RATE: 79 BPM | OXYGEN SATURATION: 99 %

## 2019-09-23 DIAGNOSIS — R42 VERTIGO: ICD-10-CM

## 2019-09-23 DIAGNOSIS — Z79.4 TYPE 2 DIABETES MELLITUS WITH BOTH EYES AFFECTED BY MILD NONPROLIFERATIVE RETINOPATHY AND MACULAR EDEMA, WITH LONG-TERM CURRENT USE OF INSULIN (H): Primary | ICD-10-CM

## 2019-09-23 DIAGNOSIS — I10 HYPERTENSION, UNSPECIFIED TYPE: ICD-10-CM

## 2019-09-23 DIAGNOSIS — E11.3213 TYPE 2 DIABETES MELLITUS WITH BOTH EYES AFFECTED BY MILD NONPROLIFERATIVE RETINOPATHY AND MACULAR EDEMA, WITH LONG-TERM CURRENT USE OF INSULIN (H): Primary | ICD-10-CM

## 2019-09-23 LAB
ANION GAP SERPL CALCULATED.3IONS-SCNC: 5 MMOL/L (ref 3–14)
BASOPHILS # BLD AUTO: 0 10E9/L (ref 0–0.2)
BASOPHILS NFR BLD AUTO: 0.6 %
BUN SERPL-MCNC: 15 MG/DL (ref 7–30)
CALCIUM SERPL-MCNC: 9.3 MG/DL (ref 8.5–10.1)
CHLORIDE SERPL-SCNC: 108 MMOL/L (ref 94–109)
CO2 SERPL-SCNC: 26 MMOL/L (ref 20–32)
CREAT SERPL-MCNC: 0.98 MG/DL (ref 0.52–1.04)
DIFFERENTIAL METHOD BLD: ABNORMAL
EOSINOPHIL # BLD AUTO: 0.2 10E9/L (ref 0–0.7)
EOSINOPHIL NFR BLD AUTO: 2.3 %
ERYTHROCYTE [DISTWIDTH] IN BLOOD BY AUTOMATED COUNT: 13.9 % (ref 10–15)
GFR SERPL CREATININE-BSD FRML MDRD: 63 ML/MIN/{1.73_M2}
GLUCOSE SERPL-MCNC: 115 MG/DL (ref 70–99)
HCT VFR BLD AUTO: 32.3 % (ref 35–47)
HGB BLD-MCNC: 9.8 G/DL (ref 11.7–15.7)
IMM GRANULOCYTES # BLD: 0.1 10E9/L (ref 0–0.4)
IMM GRANULOCYTES NFR BLD: 0.9 %
LYMPHOCYTES # BLD AUTO: 2.3 10E9/L (ref 0.8–5.3)
LYMPHOCYTES NFR BLD AUTO: 33.1 %
MCH RBC QN AUTO: 30.2 PG (ref 26.5–33)
MCHC RBC AUTO-ENTMCNC: 30.3 G/DL (ref 31.5–36.5)
MCV RBC AUTO: 99 FL (ref 78–100)
MONOCYTES # BLD AUTO: 0.6 10E9/L (ref 0–1.3)
MONOCYTES NFR BLD AUTO: 8.9 %
NEUTROPHILS # BLD AUTO: 3.8 10E9/L (ref 1.6–8.3)
NEUTROPHILS NFR BLD AUTO: 54.2 %
NRBC # BLD AUTO: 0 10*3/UL
NRBC BLD AUTO-RTO: 0 /100
PLATELET # BLD AUTO: 212 10E9/L (ref 150–450)
POTASSIUM SERPL-SCNC: 4.1 MMOL/L (ref 3.4–5.3)
RBC # BLD AUTO: 3.25 10E12/L (ref 3.8–5.2)
SODIUM SERPL-SCNC: 139 MMOL/L (ref 133–144)
WBC # BLD AUTO: 7 10E9/L (ref 4–11)

## 2019-09-23 PROCEDURE — 80048 BASIC METABOLIC PNL TOTAL CA: CPT | Performed by: EMERGENCY MEDICINE

## 2019-09-23 PROCEDURE — 93005 ELECTROCARDIOGRAM TRACING: CPT | Mod: 76

## 2019-09-23 PROCEDURE — 36415 COLL VENOUS BLD VENIPUNCTURE: CPT | Performed by: EMERGENCY MEDICINE

## 2019-09-23 PROCEDURE — 99284 EMERGENCY DEPT VISIT MOD MDM: CPT

## 2019-09-23 PROCEDURE — 25000132 ZZH RX MED GY IP 250 OP 250 PS 637: Mod: GY | Performed by: EMERGENCY MEDICINE

## 2019-09-23 PROCEDURE — 93005 ELECTROCARDIOGRAM TRACING: CPT

## 2019-09-23 PROCEDURE — 85025 COMPLETE CBC W/AUTO DIFF WBC: CPT | Performed by: EMERGENCY MEDICINE

## 2019-09-23 RX ORDER — LOSARTAN POTASSIUM 50 MG/1
50 TABLET ORAL ONCE
Status: COMPLETED | OUTPATIENT
Start: 2019-09-23 | End: 2019-09-23

## 2019-09-23 RX ORDER — MECLIZINE HYDROCHLORIDE 25 MG/1
25 TABLET ORAL ONCE
Status: COMPLETED | OUTPATIENT
Start: 2019-09-23 | End: 2019-09-23

## 2019-09-23 RX ADMIN — MECLIZINE HYDROCHLORIDE 25 MG: 25 TABLET ORAL at 17:54

## 2019-09-23 RX ADMIN — LOSARTAN POTASSIUM 50 MG: 50 TABLET, FILM COATED ORAL at 21:28

## 2019-09-23 ASSESSMENT — ENCOUNTER SYMPTOMS
HEADACHES: 1
DIZZINESS: 1
NAUSEA: 1
WEAKNESS: 0
NUMBNESS: 0

## 2019-09-23 NOTE — ED TRIAGE NOTES
Patient aox4, ABCs intact. Pt arrives via EMS from Adult  since 1400 today. Pt states that she is feeling nauseated and dizzy since she got her flu shot and 2 other shots. Denies pain.

## 2019-09-23 NOTE — ED AVS SNAPSHOT
[facility] Emergency Department  [address]  [facility] [address]  Phone:  [phone]  Fax:  [phone]                                    Nena Tang   MRN: [id number]    Department:  [facility] Emergency Department   Date of Visit:  9/23/2019           After Visit Summary Signature Page    I have received my discharge instructions, and my questions have been answered. I have discussed any challenges I see with this plan with the nurse or doctor.    ..........................................................................................................................................  Patient/Patient Representative Signature      ..........................................................................................................................................  Patient Representative Print Name and Relationship to Patient    ..................................................               ................................................  Date                                   Time    ..........................................................................................................................................  Reviewed by Signature/Title    ...................................................              ..............................................  Date                                               Time          22EPIC Rev 08/18

## 2019-09-23 NOTE — ED NOTES
Bed: ED27  Expected date: 9/23/19  Expected time: 4:11 PM  Means of arrival: Ambulance  Comments:  ALESSANDRA

## 2019-09-23 NOTE — ED PROVIDER NOTES
History     Chief Complaint:  Dizziness    History limited by: patient is a poor historian.      Nena Tang is a 59 year old female with a history of HTN, vertigo who presents to the emergency department for evaluation of dizziness. The patient reports she has experienced dizziness today consistent with her history of vertigo, though it is more intense today. The dizziness worsens with standing but is unchanged with turning her head. She had her blood pressure checked at [facility] and it was noted to be elevated, prompting her to arrive to the ED. The patient further reports some nausea, fatigue, and mild headache. Of note, the patient received a flu immunization and 2 other immunizations today, and her symptoms started around 2 hours after receiving the immunizations. The patient denies any numbness or weakness, as well as any chest pain.    Allergies:  Imidazole Antifungals  Ketoprofen  Lisinopril  Metformin  Metronidazole  Posaconazole     Medications:    Aspirin  Lipitor  Cogentin  Klonopin  Benadryl  Gabapentin  Robitussin  Haldol  Insulin  Cozaar  Melatonin  Metoprolol  Nystatin  Invega  Zantac  Zoloft  Topamax     Past Medical History:    CAD  Chronic low back pain  Cocaine abuse  Heroin abuse  HTN  HLD  IBS  Migraine  Depression  Verbal auditory hallucination  Uterine cancer  DM2  Suicidal intent  Sepsis  Takotsubo cardiomyopathy  Schizoaffective disorder  Pneumonia  Osteopenia  OA  Vertigo  Obesity    Past Surgical History:    Oophorectomy  Cataract IOL  Coronary CTA  Hysterectomy  Cholecystectomy  Phacoemulsification clear cornea with standard IOL bilateral  Release trigger finger    Family History:    Cancer, bladder  COPD  Cirrhosis of liver  Alcohol/durg use  Diabetes  HTN  Lipids  CAD  Psychotic disorder  Colon cancer    Social History:  Presents alone.  Never smoker.  Negative for alcohol use.  Marital Status:   [5]     Review of Systems   Cardiovascular: Negative for chest pain.    Gastrointestinal: Positive for nausea.   Neurological: Positive for dizziness and headaches. Negative for weakness and numbness.   All other systems reviewed and are negative.    Physical Exam     Patient Vitals for the past 24 hrs:   BP Temp Temp src Pulse Heart Rate Resp SpO2 Weight   09/23/19 2205 (!) 157/77 -- -- 79 -- -- -- --   09/23/19 2150 -- -- -- -- -- -- 99 % --   09/23/19 2145 (!) 172/89 -- -- 75 73 -- 99 % --   09/23/19 2140 -- -- -- -- 74 -- 96 % --   09/23/19 2135 -- -- -- -- 74 -- 97 % --   09/23/19 2130 (!) 165/85 -- -- 74 77 -- 98 % --   09/23/19 2125 -- -- -- -- 72 -- 97 % --   09/23/19 2120 -- -- -- -- 74 -- 97 % --   09/23/19 2115 (!) 162/80 -- -- 74 74 -- 97 % --   09/23/19 2110 -- -- -- -- 73 -- 97 % --   09/23/19 2105 -- -- -- -- 74 -- 97 % --   09/23/19 2100 (!) 172/82 -- -- 74 73 -- 97 % --   09/23/19 2055 -- -- -- -- 74 -- 99 % --   09/23/19 2050 -- -- -- -- 74 -- 99 % --   09/23/19 2045 (!) 173/88 -- -- 78 75 -- 97 % --   09/23/19 2040 -- -- -- -- -- -- 99 % --   09/23/19 2035 -- -- -- -- -- -- 96 % --   09/23/19 2030 (!) 176/87 -- -- 76 -- -- 97 % --   09/23/19 2025 -- -- -- -- -- -- 98 % --   09/23/19 2020 -- -- -- -- -- -- 99 % --   09/23/19 2015 (!) 170/82 -- -- 74 -- -- 96 % --   09/23/19 2010 -- -- -- -- -- -- 99 % --   09/23/19 2005 -- -- -- -- -- -- 99 % --   09/23/19 2000 (!) 177/82 -- -- 75 -- -- 97 % --   09/23/19 1945 (!) 178/86 -- -- 75 -- -- 99 % --   09/23/19 1930 (!) 178/76 -- -- 75 -- -- 98 % --   09/23/19 1915 -- -- -- 75 -- -- 97 % --   09/23/19 1900 -- -- -- 74 74 (!) 32 99 % --   09/23/19 1845 -- -- -- 75 73 23 96 % --   09/23/19 1830 (!) 166/60 -- -- 76 -- (!) 93 92 % --   09/23/19 1815 (!) 171/139 -- -- 72 72 10 98 % --   09/23/19 1800 (!) 153/88 -- -- 71 72 11 97 % --   09/23/19 1745 (!) 172/83 -- -- 73 72 -- 96 % --   09/23/19 1730 (!) 159/76 -- -- 73 -- -- 97 % --   09/23/19 1715 (!) 162/122 -- -- 73 73 -- 97 % --   09/23/19 1700 (!) 152/93 -- -- 75 75 -- 98 %  --   09/23/19 1645 (!) 175/91 -- -- 76 -- -- 99 % --   09/23/19 1642 (!) 180/90 97.6  F (36.4  C) Oral -- 76 16 98 % 104.3 kg (230 lb)     Physical Exam  General: Patient is alert and interactive when I enter the room  Head:  The scalp, face, and head appear normal  Eyes:  Conjunctivae are normal, EOMI, no nystagmus   ENT:    The nose is normal    Pinnae are normal    External acoustic canals are normal  Neck:  Trachea midline  CV:  Pulses are normal, RRR    Resp:  No respiratory distress, CTAB   Abdomen:      Soft, non-tender, non-distended  Musc:  Normal muscular tone    No major joint effusions    No asymmetric leg swelling  Skin:  No rash or lesions noted  Neuro:  Speech is normal and fluent. Face is symmetric.     Moving all extremities well. No ataxia.  No pronator drift.  Cranial nerves intact.  Psych: Awake. Alert.  Normal affect.  Appropriate interactions.    Emergency Department Course   ECG:  Time: 1644  Vent. Rate 76 bpm. WY interval 172. QRS duration 88. QT/QTc 392/441. P-R-T axis 47 -46 -1.  Normal sinus rhythm.  Left axis deviation.  Nonspecific T wave abnormality.  Abnormal ECG.  Read time: 1650    Time: 1756  Vent. Rate 72 bpm. WY interval 162. QRS duration 92. QT/QTc 402/440. P-R-T axis 62 -47 -11.  Normal sinus rhythm.  Left axis deviation.  Septal infarct, age undetermined.  Abnormal ECG.  Read time: 1756    Laboratory:  CBC: WBC: 7.0, HGB: 9.8 (L), PLT: 212  BMP: Glucose 115 (H), o/w WNL (Creatinine: 0.98)    Interventions:  1754 Antivert 25 mg PO  2128 Cozaar 50 mg PO    Emergency Department Course:  Nursing notes and vitals reviewed. 1733 I performed an exam of the patient as documented above.     EKG x2 obtained in the ED, see results above.     Medicine administered as documented above. Blood drawn. This was sent to the lab for further testing, results above.    2213 I rechecked the patient and discussed the results of her workup thus far.     Findings and plan explained to the Patient.  Patient discharged home with instructions regarding supportive care, medications, and reasons to return. The importance of close follow-up was reviewed.     I personally reviewed the laboratory results with the Patient and answered all related questions prior to discharge.    Impression & Plan      Medical Decision Making:  Nena Tang is a 59 year old female who presents for evaluation of vertigo. The differential diagnosis of vertigo is broad and includes common etiologies such as menieres disease, labyrinthitis, benign positional vertigo, otitis media, etc.  More serious etiologies considered include central etiologies such as tumor, intracerebral bleed, dissection, ischemic cerebral vascular accident.  The history, physical exam including  neurologic exam, and workup in the emergency room suggests a benign cause of vertigo today.  Patient feels somewhat improved after interventions noted above.  She was able to ambulate.  I reviewed her chart patient has been here many times in the past for similar complaints.  EKG was obtained due to patient's concern for elevated blood pressure. No indication for advanced imaging at this point (CT/MRI) as there are no definite signs of a central and concerning etiology for the vertigo.  She had not taken her evening hypertension medication yet today so we gave her her nighttime dose.  No signs of endorgan dysfunction.  Blood pressure improved at discharge.      Diagnosis:    ICD-10-CM   1. Vertigo R42   2. Hypertension, unspecified type I10       Disposition:  discharged to home    ICharbel, am serving as a scribe on 9/23/2019 at 4:43 PM to personally document services performed by Ml Dover MD based on my observations and the provider's statements to me.     Charbel Dailey  9/23/2019   [facility] EMERGENCY DEPARTMENT       Ml Dover MD  09/27/19 1950       Ml Dover MD  09/27/19 1952

## 2019-09-24 DIAGNOSIS — K21.9 GASTROESOPHAGEAL REFLUX DISEASE WITHOUT ESOPHAGITIS: ICD-10-CM

## 2019-09-24 DIAGNOSIS — I25.119 CORONARY ARTERY DISEASE INVOLVING NATIVE HEART WITH ANGINA PECTORIS, UNSPECIFIED VESSEL OR LESION TYPE (H): ICD-10-CM

## 2019-09-24 DIAGNOSIS — F25.0 SCHIZOAFFECTIVE DISORDER, BIPOLAR TYPE (H): ICD-10-CM

## 2019-09-24 LAB
INTERPRETATION ECG - MUSE: NORMAL
INTERPRETATION ECG - MUSE: NORMAL

## 2019-09-24 RX ORDER — PALIPERIDONE 9 MG/1
9 TABLET, EXTENDED RELEASE ORAL EVERY MORNING
Qty: 90 TABLET | Refills: 1 | Status: ON HOLD | OUTPATIENT
Start: 2019-09-24 | End: 2019-12-31

## 2019-09-24 RX ORDER — HALOPERIDOL 10 MG/1
5 TABLET ORAL 2 TIMES DAILY PRN
OUTPATIENT
Start: 2019-09-24

## 2019-09-24 NOTE — TELEPHONE ENCOUNTER
Requested Prescriptions   Pending Prescriptions Disp Refills     haloperidol (HALDOL) 10 MG tablet       Sig: Take 1 tablet (10 mg) by mouth 2 times daily as needed for agitation   Last Written Prescription Date:  N/A  Last Fill Quantity: N/A,  # refills: N/A   Last office visit: 9/10/2019 with prescribing provider:     Future Office Visit:   Next 5 appointments (look out 90 days)    Sep 25, 2019  3:30 PM CDT  Return Visit with BIN Mondragon  [facility] ([facility]) [address]  [address]  [facility] [address]  [phone]   Sep 25, 2019  4:30 PM CDT  Return Visit with Reginald Brothers MD  [facility] [place]) [address]  [facility] [address]  [phone]   Oct 11, 2019  1:00 PM CDT  Return Visit with ART Arias CNP  [facility] ([facility]) [address]  [address]  [facility] [address]  [phone]   Oct 11, 2019  1:00 PM CDT  Return Visit with BIN Mondragon  [facility] [place]) [address]  [address]  [facility] [address]  [phone]             There is no refill protocol information for this order

## 2019-09-24 NOTE — TELEPHONE ENCOUNTER
Signed Prescriptions:                        Disp   Refills    ranitidine (ZANTAC) 300 MG tablet          90 tab*0        Sig: TAKE 1 TABLET (300MG) BY MOUTH AT BEDTIME  Authorizing Provider: MICHAELLE CALIXTO  Ordering User: PATI GARCIA

## 2019-09-24 NOTE — TELEPHONE ENCOUNTER
"Requested Prescriptions   Pending Prescriptions Disp Refills     aspirin (ASA) 81 MG EC tablet    Last Written Prescription Date:  6/13/19  Last Fill Quantity: 30,  # refills: 3   Last office visit: 9/10/2019 with prescribing provider:     Future Office Visit:   Next 5 appointments (look out 90 days)    Sep 25, 2019  3:30 PM CDT  Return Visit with BIN Mondragon  [facility] ([facility]) [address]  [address]  [facility] [address]  [phone]   Sep 25, 2019  4:30 PM CDT  Return Visit with Reginald Brothers MD  [facility] ([facility]) [address]  [facility] [address]  [phone]   Oct 11, 2019  1:00 PM CDT  Return Visit with ART Arias CNP  [facility] ([facility]) [address]  [address]  [facility] [address]  [phone]   Oct 11, 2019  1:00 PM CDT  Return Visit with BIN Mondragon  [facility] ([facility]) [address]  [address]  [facility] [address]  [phone]          30 tablet 3     Sig: TAKE 1 TABLET (81MG) BY MOUTH DAILY       Analgesics (Non-Narcotic Tylenol and ASA Only) Passed - 9/24/2019 10:03 AM        Passed - Recent (12 mo) or future (30 days) visit within the authorizing provider's specialty     Patient had office visit in the last 12 months or has a visit in the next 30 days with authorizing provider or within the authorizing provider's specialty.  See \"Patient Info\" tab in inbasket, or \"Choose Columns\" in Meds & Orders section of the refill encounter.              Passed - Patient is age 20 years or older     If ASA is flagged for ages under 20 years old. Forward to provider for confirmation Ryes Syndrome is not a concern.              Passed - Medication is active on med list        " paliperidone ER (INVEGA) 9 MG 24 hr tablet    Last Written Prescription Date:  7/5/19  Last Fill Quantity: 30,  # refills: 2   Last office visit: 9/10/2019 with prescribing provider:     Future Office Visit:   Next 5 appointments (look out 90 days)    Sep 25, 2019  3:30 PM CDT  Return Visit with BIN Mondragon  [facility] ([facility]) [address]  [address]  [facility] [address]  [phone]   Sep 25, 2019  4:30 PM CDT  Return Visit with Reginald Brothers MD  [facility] ([facility]) [address]  [facility] [address]  [phone]   Oct 11, 2019  1:00 PM CDT  Return Visit with ART Arias CNP  [facility] ([facility]) [address]  [address]  [facility] [address]  [phone]   Oct 11, 2019  1:00 PM CDT  Return Visit with BIN Mondragon  [facility] ([facility]) [address]  [facility] [address]  [phone]          30 tablet 2     Sig: Take 1 tablet (9 mg) by mouth every morning       There is no refill protocol information for this order

## 2019-09-24 NOTE — TELEPHONE ENCOUNTER
"Requested Prescriptions   Pending Prescriptions Disp Refills     ranitidine (ZANTAC) 300 MG tablet 90 tablet 1     Sig: TAKE 1 TABLET (300MG) BY MOUTH AT BEDTIME   Last Written Prescription Date:  4/8/19  Last Fill Quantity: 90,  # refills: 1   Last office visit: 9/10/2019 with prescribing provider:     Future Office Visit:   Next 5 appointments (look out 90 days)    Sep 25, 2019  3:30 PM CDT  Return Visit with BIN Mondragon  [facility] ([facility]) [address]  [facility] [address]  [phone]   Sep 25, 2019  4:30 PM CDT  Return Visit with Reginald Brothers MD  [facility] ([facility]) [address]  [facility] [address]  [phone]   Oct 11, 2019  1:00 PM CDT  Return Visit with ART Arias CNP  [facility] ([facility]) [address]  [address]  [facility] [address]  [phone]   Oct 11, 2019  1:00 PM CDT  Return Visit with BIN Mondragon  [facility] ([facility]) [address]  [address]  [facility] [address]  [phone]             H2 Blockers Protocol Passed - 9/24/2019  9:57 AM        Passed - Patient is age 12 or older        Passed - Recent (12 mo) or future (30 days) visit within the authorizing provider's specialty     Patient had office visit in the last 12 months or has a visit in the next 30 days with authorizing provider or within the authorizing provider's specialty.  See \"Patient Info\" tab in inbasket, or \"Choose Columns\" in Meds & Orders section of the refill encounter.              Passed - Medication is active on med list          "

## 2019-09-25 ENCOUNTER — OFFICE VISIT (OUTPATIENT)
Dept: BEHAVIORAL HEALTH | Facility: CLINIC | Age: 59
End: 2019-09-25
Payer: MEDICARE

## 2019-09-25 ENCOUNTER — OFFICE VISIT (OUTPATIENT)
Dept: PSYCHIATRY | Facility: CLINIC | Age: 59
End: 2019-09-25
Payer: MEDICARE

## 2019-09-25 VITALS — SYSTOLIC BLOOD PRESSURE: 118 MMHG | DIASTOLIC BLOOD PRESSURE: 66 MMHG

## 2019-09-25 DIAGNOSIS — F43.10 POSTTRAUMATIC STRESS DISORDER: ICD-10-CM

## 2019-09-25 DIAGNOSIS — F20.89 OTHER SCHIZOPHRENIA (H): Primary | ICD-10-CM

## 2019-09-25 DIAGNOSIS — F25.1 SCHIZOAFFECTIVE DISORDER, DEPRESSIVE TYPE (H): Primary | ICD-10-CM

## 2019-09-25 PROCEDURE — 90837 PSYTX W PT 60 MINUTES: CPT | Performed by: SOCIAL WORKER

## 2019-09-25 PROCEDURE — 99213 OFFICE O/P EST LOW 20 MIN: CPT | Mod: GC | Performed by: PSYCHIATRY & NEUROLOGY

## 2019-09-25 NOTE — PATIENT INSTRUCTIONS
- Continue current medications as prescribed.    - Clonazepam is for parasomnia and will be a long-term medication.

## 2019-09-25 NOTE — PROGRESS NOTES
[facility] - Integrated Primary Care   September 25, 2019    Behavioral Health Clinician Progress Note    Patient Name: Nena Tang           Service Type:  Individual      Service Location:   Face to Face in Clinic     Session Start Time: 3:30pm  Session End Time: 4:25pm      Session Length: 53 - 60      Attendees: Patient    Visit Activities (Refresh list every visit): [facility] Only     Diagnostic Assessment Date: 1-23-18    Treatment Plan Review Date: 12-  See Flowsheets for today's PHQ-9 and TAMIA-7 results  Previous PHQ-9:   PHQ-9 SCORE 3/13/2018 10/26/2018 5/7/2019   PHQ-9 Total Score - - -   PHQ-9 Total Score - - -   PHQ-9 Total Score 14 20 22     Previous TAMIA-7:   TAMIA-7 SCORE 2/3/2017 3/13/2018 10/26/2018   Total Score - - -   Total Score 0 17 19   Total Score - - -       ALEXANDRA LEVEL:  ALEXANDRA Score (Last Two) 8/30/2011 6/9/2014   ALEXANDRA Raw Score 42 37   Activation Score 66 49.9   ALEXANDRA Level 3 2       DATA  Extended Session (60+ minutes): PROLONGED SERVICE IN THE OUTPATIENT SETTING REQUIRING DIRECT (FACE-TO-FACE) PATIENT CONTACT BEYOND THE USUAL SERVICE:    - Longer session due to limited access to mental health appointments and necessity to address patient's distress / complexity  Interactive Complexity: No  Crisis: No  [facility] Patient: No    Treatment Objective(s) Addressed in This Session:  Target Behavior(s): disease management/lifestyle changes mental health    Depressed Mood: Increase interest, engagement, and pleasure in doing things  Decrease frequency and intensity of feeling down, depressed, hopeless  Improve quantity and quality of night time sleep / decrease daytime naps  Identify negative self-talk and behaviors: challenge core beliefs, myths, and actions  Improve concentration, focus, and mindfulness in daily activities   Feel less fidgety, restless or slow in daily activities / interpersonal interactions  Decrease thoughts that you'd be better off dead or of suicide / self-harm  Anxiety: will  experience a reduction in anxiety, will develop more effective coping skills to manage anxiety symptoms and will develop healthy cognitive patterns and beliefs  Risk / Safety: will develop strategies for more effective management of risk issues  Grief / Loss: will engage in effective approach to address and resolve grief/loss issues and will process grief/loss issues in an adaptive manner  Thought Disturbance: will develop skills to more effectively manage symptoms, will develop more effective support systems and will continue to take medications as prescribed    Current Stressors / Issues:    The patient has been feeling more tired in the morning at waking-she went in the ED 2 times due to shaking and she was (thinking strongly)-she does not remember her living program staff throwing to get her attention and she does not remember being at the hospital-she stated that she was dissociating and then she went to ED yesterday for high blood pressure-today she is feeling tired and she stated that she feels that she is hear and not hear-WHATS BEEN BOTHERING YOU: she stated that she got a phone call from her 's sister and they are coming to get his ashes (I am happy about this)-I want a break from the ashes because I am tired-she is planning to take some of the ashes and put them in a locket-her son was able to find her 's family's and then the family (sister) called the patient and they caught up-she is so sweat and they love me and care for me-she goes to a grieving group and I love it there and there are good people in the group-she experiences her  in her heart-the anniversary is coming up in October (the day he  is the )-I am going to be okay-if she had the energy to tell the worker that she is okay-she stated that she was dazed-she talked about being at the drop in center she had dazed vision and she expressed this to others and she told them that she is dizzy-what are the  symptoms: seeing double, shaking-encouraged the patient to practice observing through vision to ground self to be present-no thoughts of harm to self as she has not thought about this in a long time-I need to do this-then she can deal with letting go of her mother's ashes-      Progress on Treatment Objective(s) / Homework:  Minimal progress - ACTION (Actively working towards change); Intervened by reinforcing change plan / affirming steps taken    Motivational Interviewing    MI Intervention: Expressed Empathy/Understanding, Supported Autonomy, Collaboration, Evocation, Permission to raise concern or advise, Open-ended questions, Reflections: simple and complex, Rolled with resistance: Emphasized patient autonomy, Simple reflection and Evoked patient agenda and Change talk (evoked)     Change Talk Expressed by the Patient: Desire to change Ability to change Reasons to change Need to change Committment to change Activation Taking steps    Provider Response to Change Talk: E - Evoked more info from patient about behavior change, A - Affirmed patient's thoughts, decisions, or attempts at behavior change, R - Reflected patient's change talk and S - Summarized patient's change talk statements      Care Plan review completed: No    Medication Review:  No changes to current psychiatric medication(s)     Medication Compliance:  NA    Changes in Health Issues:   None reported     Chemical Use Review:   Substance Use: Chemical use reviewed, no active concerns identified      Tobacco Use: No current tobacco use.      Assessment: Current Emotional / Mental Status (status of significant symptoms):  Risk status (Self / Other harm or suicidal ideation)  Patient has had a history of suicidal ideation: yes  Patient denies current fears or concerns for personal safety.  Patient denies current or recent suicidal ideation or behaviors.  Patient denies current or recent homicidal ideation or behaviors.  Patient denies current or recent  self injurious behavior or ideation.  Patient denies other safety concerns.  A safety and risk management plan has not been developed at this time, however patient was encouraged to call [place] [phone] should there be a change in any of these risk factors.    Appearance:   Appropriate   Eye Contact:   Good   Psychomotor Behavior: Normal   Attitude:   Cooperative   Orientation:   All  Speech   Rate / Production: Normal    Volume:  Normal   Mood:    Depressed  Normal  Affect:    Appropriate  Worrisome   Thought Content:  Clear   Thought Form:  Coherent  Goal Directed  Logical   Insight:    Fair     Diagnoses:  1. Schizoaffective disorder, depressive type (H)    2. Posttraumatic stress disorder        Collateral Reports Completed:  Not Applicable    Plan: (Homework, other):  Patient was given information about behavioral services and encouraged to schedule a follow up appointment with the clinic [facility] as needed to continue to work on the patient use of DBT mindful coping with cognitive challenge with the psychotic symptoms and to work on stress management around her grieving process.  She was also given information about mental health symptoms and treatment options .  CD Recommendations: Maintain Sobriety.    ARMOND George, [facility]                                                    Treatment Plan    Client's Name: Nena Tang  YOB: 1960    Date: 6-4-2019    DSM5 Diagnoses: (Sustained by DSM5 Criteria Listed Above)  Diagnoses:  295.70 (F25.0), Schizoaffective disorder, depressive type; 309.81 (F43.10) Posttraumatic Stress Disorder with panic specifier, history of chemical dependency issues (polysubstance dependence)  Psychosocial & Contextual Factors: Academics / Education - yes  Activities of Daily Living - yes  Financial management yes  Follow through with Medical recommendations - yes  Occupational / Vocational - yes  Social / Relational - yes, grief and loss  WHODAS Score: 30      Referral /  Collaboration:  Referral to another professional/service is not indicated at this time..    Anticipated number of session or this episode of care: 20      MeasurableTreatment Goal(s) related to diagnosis / functional impairment(s)  Goal 1: Client will improve her ability to manage anxiety and mood states.     I will know I've met my goal when the man does not paralyze the me with fear.     Objective #A (Client Action)    Client will increase understanding of steps in the grief process.  Status: Continued - Date(s):  9-: able to remember the good time and able to remember these important people without wanting to die-she stated that she needs more work to be able to remember and not fall apart.     Intervention(s)  Therapist will teach emotional recognition/identification. teach the navigation of grieving encouraging acceptance and adjustment.    Objective #B  Client will Decrease frequency and intensity of feeling down, depressed, hopeless  Decrease thoughts that you'd be better off dead or of suicide / self-harm.  Status: Continued - Date(s):   9-: she does not think about harming the self as much     Intervention(s)  Therapist will teach emotional regulation skills. with the use of mindful coping strategies and open communication of feelings and thoughts (getting it out to another person).    Objective #C  Client will identify at least 3 example(s) of how drinking has resulted in an experience that interferes with person values or goals.  Status: Completed - Date: She stated she was sober  for 25 years.    Intervention(s)  Therapist will teach the client how to perform a behavioral chain analysis. Process the experience of playing the tape forward to help with making the next right decision. .          Client has reviewed and agreed to the above plan.      Richardson Lopez, [facility]  June 4, 2019          ______________________________________________________________________

## 2019-09-25 NOTE — PROGRESS NOTES
"  INTEGRATED PRIMARY CARE PSYCHIATRY  PROGRESS NOTE   30 minute medication management     INTERIM HISTORY                                                 The patient was last seen on 7/10/2019 at which time clonazepam was started.  The patient reports good treatment adherence.    Since the last visit:  - Had an episode of presumed dissociation several weeks back; she ended up at the hospital. This was worked up and she was ultimately released. Not sure what the trigger was. No subsequent episodes.  - Anniversary of 's death is coming up on October 25. She's planning on giving most of his ashes to his sister. She anticipates it will be a \"rough day\".  - Mood overall stable. No SI, HI. Still sees \"demon spirit\" at night but much more tolerable since clonazepam was started. Similarly, voices are still present but less bothersome.  - Anxiety is \"up and down\". She worries about \"what's going to happen\". She talks to her therapist about this.  - Continues to have tremor her hand. This is not too distressing.  Substance use:     Denies    PSYCH ROS: DEPRESSION:  reports-none;  DENIES- suicidal ideation, self-destructive thoughts and depressed mood  PSYCHOSIS:  reports-auditory hallucinations and visual hallucinations;  DENIES- delusions  DYSREGULATION:  reports-none;  DENIES- suicidal ideation, violent ideation and mood dysregulation  ANXIETY:  excessive worry  TRAUMA RELATED:  non-flashback dissociation     MEDICAL ROS:  Reports A comprehensive review of systems was performed and is negative other than noted in the HPI.    MEDICATIONS                               Current Outpatient Medications   Medication Sig Dispense Refill     aspirin (ASA) 81 MG EC tablet TAKE 1 TABLET (81MG) BY MOUTH DAILY 90 tablet 0     atorvastatin (LIPITOR) 80 MG tablet TAKE 1 TABLET (80MG) BY MOUTH DAILY 30 tablet 11     benztropine (COGENTIN) 0.5 MG tablet Take 2 tablets (1 mg) by mouth 2 times daily 120 tablet 3     blood glucose (NO " BRAND SPECIFIED) test strip Use to test blood sugar  3 times daily or as directed. To accompany: Blood Glucose Monitor Brands: per insurance. 100 strip 0     calcium carbonate (OS-ALLEN) 1500 (600 Ca) MG tablet Take 3 tablets (1,800 mg) by mouth daily 180 tablet 3     cholecalciferol (VITAMIN D3) 85632 units (1250 mcg) capsule TAKE 1 CAPSULE (50,000 UNITS) MONTHLY 4 capsule 3     clonazePAM (KLONOPIN) 0.5 MG tablet Take 1 tablet (0.5 mg) by mouth At Bedtime 30 tablet 0     Continuous Blood Gluc Sensor (DEXCOM G5 MOB/G4 PLAT SENSOR) MISC 1 Device every 7 days       Continuous Blood Gluc Transmit (DEXCOM G5 MOBILE TRANSMITTER) MISC 1 Device every 3 months       diphenhydrAMINE (BENADRYL) 25 MG tablet Take 1 tablet in am and 1 tablet at 2 pm. Until Rash resolves 90 tablet 0     gabapentin (NEURONTIN) 300 MG capsule Take 3 capsules (900 mg) by mouth 3 times daily 270 capsule 1     guaiFENesin (ROBITUSSIN) 100 MG/5ML liquid Take 10 mLs (200 mg) by mouth nightly as needed for cough 118 mL 0     haloperidol (HALDOL) 10 MG tablet Take 5 mg by mouth 2 times daily as needed for agitation       ibuprofen (ADVIL/MOTRIN) 200 MG tablet Take 200 mg by mouth every 8 hours as needed for mild pain       insulin aspart (NOVOLOG FLEXPEN) 100 UNIT/ML pen Inject 20 Units Subcutaneous 3 times daily (with meals) Once daily, can add additional 5 units if BGs are >500mg/dL.  Hold Insulin if BG are<70. 15 mL 11     insulin glargine (LANTUS SOLOSTAR PEN) 100 UNIT/ML pen Inject 60 Units Subcutaneous At Bedtime 15 mL 11     insulin pen needle (NOVOFINE 30) 30G X 8 MM miscellaneous USE 4 DAILY OR AS DIRECTED 400 each 0     ipratropium - albuterol 0.5 mg/2.5 mg/3 mL (DUONEB) 0.5-2.5 (3) MG/3ML neb solution Take 1 vial (3 mLs) by nebulization every 6 hours as needed for shortness of breath / dyspnea or wheezing 30 vial 0     lidocaine (LIDODERM) 5 % patch Place 1 patch onto the skin every 24 hours 10 patch 0     losartan (COZAAR) 50 MG tablet Take 1  tablet (50 mg) by mouth daily 90 tablet 3     melatonin 5 MG CAPS Take 2 capsules by mouth At Bedtime 180 capsule 3     metoprolol succinate ER (TOPROL-XL) 25 MG 24 hr tablet Take 1 tablet (25 mg) by mouth daily 90 tablet 1     nystatin (MYCOSTATIN) 048956 UNIT/GM external cream Apply topically 2 times daily 30 g 3     nystatin (MYCOSTATIN) 267096 UNIT/GM external powder Apply topically once as needed for dry skin 60 g 3     order for DME Equipment being ordered: CPAP Supplies. 1 each 0     order for DME Equipment being ordered: Depends. 30 each 1     order for DME Equipment being ordered: Freestyle [place] 1 Device 0     paliperidone ER (INVEGA) 9 MG 24 hr tablet Take 1 tablet (9 mg) by mouth every morning 90 tablet 1     polyethylene glycol (MIRALAX) powder Take 17 g (1 capful) by mouth daily as needed for constipation 500 g 3     ranitidine (ZANTAC) 300 MG tablet TAKE 1 TABLET (300MG) BY MOUTH AT BEDTIME 90 tablet 0     semaglutide (OZEMPIC) 1 MG/DOSE pen Inject 1 mg Subcutaneous every 7 days 6 mL 5     sertraline (ZOLOFT) 100 MG tablet Take 2 tablets (200 mg) by mouth daily 60 tablet 3     topiramate (TOPAMAX) 50 MG tablet Take 1.5 tablets (75 mg) by mouth 2 times daily 270 tablet 0     VITALS                                                                                                                            3, 3   LMP 01/06/2015    MENTAL STATUS EXAM                                                                                    9, 14 cog gs   Alertness: alert  and oriented  Appearance: adequately groomed  Behavior/Demeanor: cooperative, pleasant and calm, with fair  eye contact   Speech: slowed  Language: undefined difficulty  Psychomotor: Mouth movements present; mouth open at baseline  Mood: anxious  Affect: restricted and blunted; was congruent to mood; was congruent to content  Thought Process/Associations: unremarkable  Thought Content:  Reports none;  Denies suicidal and violent ideation  and delusions  Perception:  Reports auditory hallucinations without commands [details in Interim History] and visual hallucinations [details in Interim History];  Denies none  Insight: fair  Judgment: fair  Cognition: (6) does  appear grossly intact; formal cognitive testing was not done  Gait and Station: unremarkable    LABS and DATA     PHQ-9 SCORE 3/13/2018 10/26/2018 5/7/2019   PHQ-9 Total Score - - -   PHQ-9 Total Score - - -   PHQ-9 Total Score 14 20 22         Recent Labs   Lab Test 09/23/19 1814 09/14/19 1915 07/31/19  0619   CR 0.98 0.90 0.92   GFRESTIMATED 63 70 68     Recent Labs   Lab Test 09/14/19 1915 07/28/19  1141 05/29/19  2226   AST 36 17 24   ALT 27 23 32   ALKPHOS 88 97 97       PSYCHIATRIC DIAGNOSES                                                                                                   Schizoaffective Disorder  Tardive Dyskinesia     ASSESSMENT                                     Interval mood stability, some improvement in auditory and visual hallucinations, particularly at night since initiation of Klonopin for parasomnia. Tardive mouth movements present;  At this time, symptomatic movements are not too distressing for patient. Ideally would decrease Invega but given auditory and visual hallucinations at baseline that have been distressing in the recent past will maintain current dose of Invega for now. She is agreeable to maintaining medications at this time.    No imminent safety concerns at this time. Patient denies SI, SIB, and HI.    TREATMENT RISK STATEMENT:  The risks, benefits, alternatives and potential adverse effects have been explained and are understood by the pt. The pt agrees to the treatment plan with the ability to do so. The pt knows to call the clinic for any problems or to access emergency care if needed.  Medical and CD concerns are documented above.       PLAN                                                                                                        1) MEDICATION:   - Start Clonazepam 0.5mg at bedtime              - Continue Sertraline 200mg daily              - Continue Invega 9mg daily              - Continue Benztropine 1mg twice daily    2) THERAPY:  Continue    4) Notes for PCP: none     5) RTC: 1-2 months    TREATMENT RISK STATEMENT:  The risks, benefits, alternatives and potential adverse effects have been discussed and are understood by the pt. The pt understands the risks of using street drugs or alcohol. There are no medical contraindications, the pt agrees to treatment with the ability to do so. The pt knows to call the clinic for any problems or to access emergency care if needed.  Medical and substance use concerns are documented above.      RESIDENT:  Kain Campa MD    Patient was staffed in clinic with Dr. Brothers who will sign the note.I, Dr. Brothers, had an opportunity to interview this patient with the resident and was present for key portions of the exam. I agree with the assessment and plan outlined above.     On my interview the patient was dressed in casual clothing and appeared stated age. Patient was pleasant and cooperative, mood neutral, affect blunted, speech was coherent and goal oriented. Associations tight.oral buccal dyskinesia  pressent Thought process was logical and linear. Content of thought with auditory no suicidal ideation.Patient alert and oriented x 3.  Recent and remote memory, concentration, fund of knowledge, and use of language were within normal limits. Insight and judgement intact. Gait and station within normal limits.     Dr. JAIR Brothers

## 2019-09-26 ENCOUNTER — OFFICE VISIT (OUTPATIENT)
Dept: OPHTHALMOLOGY | Facility: CLINIC | Age: 59
End: 2019-09-26
Attending: OPHTHALMOLOGY
Payer: MEDICARE

## 2019-09-26 DIAGNOSIS — Z79.4 TYPE 2 DIABETES MELLITUS WITH BOTH EYES AFFECTED BY MILD NONPROLIFERATIVE RETINOPATHY AND MACULAR EDEMA, WITH LONG-TERM CURRENT USE OF INSULIN (H): ICD-10-CM

## 2019-09-26 DIAGNOSIS — E11.3213 TYPE 2 DIABETES MELLITUS WITH BOTH EYES AFFECTED BY MILD NONPROLIFERATIVE RETINOPATHY AND MACULAR EDEMA, WITH LONG-TERM CURRENT USE OF INSULIN (H): ICD-10-CM

## 2019-09-26 PROCEDURE — G0463 HOSPITAL OUTPT CLINIC VISIT: HCPCS | Mod: ZF

## 2019-09-26 PROCEDURE — 92134 CPTRZ OPH DX IMG PST SGM RTA: CPT | Mod: ZF | Performed by: OPHTHALMOLOGY

## 2019-09-26 ASSESSMENT — CONF VISUAL FIELD
METHOD: COUNTING FINGERS
OD_NORMAL: 1
OS_NORMAL: 1

## 2019-09-26 ASSESSMENT — TONOMETRY
IOP_METHOD: TONOPEN
OS_IOP_MMHG: 19
OD_IOP_MMHG: 19

## 2019-09-26 ASSESSMENT — SLIT LAMP EXAM - LIDS
COMMENTS: NORMAL
COMMENTS: NORMAL

## 2019-09-26 ASSESSMENT — VISUAL ACUITY
OD_SC: 20/25
OS_SC+: -2
METHOD: SNELLEN - LINEAR
OS_SC: 20/40
OD_SC+: -2

## 2019-09-26 ASSESSMENT — CUP TO DISC RATIO
OS_RATIO: 0.3
OD_RATIO: 0.3

## 2019-09-26 ASSESSMENT — EXTERNAL EXAM - RIGHT EYE: OD_EXAM: NORMAL

## 2019-09-26 ASSESSMENT — EXTERNAL EXAM - LEFT EYE: OS_EXAM: NORMAL

## 2019-09-26 NOTE — NURSING NOTE
Chief Complaints and History of Present Illnesses   Patient presents with     Follow Up     Type 2 diabetes mellitus with both eyes affected by mild nonproliferative retinopathy and macular edema, with long-term current use of insulin      Chief Complaint(s) and History of Present Illness(es)     Follow Up     Laterality: both eyes    Associated symptoms: dryness.  Negative for eye pain, redness and tearing    Pain scale: 0/10    Comments: Type 2 diabetes mellitus with both eyes affected by mild nonproliferative retinopathy and macular edema, with long-term current use of insulin               Comments     She states that her vision has seemed blurred for the past 2 months.  The decreased vision seems consistent since onset.    Both eyes seem intermittently dry, she has not tried using any eye drops.    Lab Results       Component                Value               Date                       A1C                      6.6                 08/06/2019                 A1C                      7.6                 05/15/2019                 A1C                      6.4                 12/20/2018                 A1C                      6.4                 08/06/2018                 A1C                      6.2                 05/22/2018                YA Oliva 9:15 AM  September 26, 2019

## 2019-09-26 NOTE — PROGRESS NOTES
I have confirmed the patient's CC, HPI and reviewed Past Medical History, Past Surgical History, Social History, Family History, Problem List, Medication List and agree with Tech note.    CC: Follow up Diabetes mellitus retinopathy    Interval history: VA seems blurrier both eyes. S/p focal laser OS with Dr. Payan at last visit 4/25/18. Marcy flashes or new floaters.    HPI: 59YO F Hx of DMII here for diabetic exam. DMII x 13 years. On oral and insulin. Last hgA1c 6.2% in 5/22/18. Glucose under more control. Hoping to get cataract surgery with Dr. Diaz now.    OCT Macula 6/18/18  OD: center involving intra-retinal fluid temporal macula with slight interval worsening, no subretinal fluid, exudates  OS: temporal intraretinal fluid surrounding an MA    Assessment/plan   1. 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 Payan 4/25/18   - new swelling just nasal to fovea in June that has resolved    - recheck in 6 months       3. Myopia OS with secondary amblyopia              Does not wear glasses normally     4.  Pseudophakia both eyes    - Mild PCO in both eyes    - Monitor    5. Dry eye syndrome    Artificial tears over the counter    Monitor         Return 6 months for exam and OCT OU      Tiburcio Martin MD PhD.  Professor & Chair.

## 2019-10-04 DIAGNOSIS — G25.9 EXTRAPYRAMIDAL AND MOVEMENT DISORDER: ICD-10-CM

## 2019-10-05 ENCOUNTER — APPOINTMENT (OUTPATIENT)
Dept: GENERAL RADIOLOGY | Facility: CLINIC | Age: 59
End: 2019-10-05
Attending: EMERGENCY MEDICINE
Payer: MEDICARE

## 2019-10-05 ENCOUNTER — HOSPITAL ENCOUNTER (EMERGENCY)
Facility: CLINIC | Age: 59
Discharge: HOME | End: 2019-10-05
Attending: EMERGENCY MEDICINE | Admitting: EMERGENCY MEDICINE
Payer: MEDICARE

## 2019-10-05 ENCOUNTER — APPOINTMENT (OUTPATIENT)
Dept: ULTRASOUND IMAGING | Facility: CLINIC | Age: 59
End: 2019-10-05
Attending: EMERGENCY MEDICINE
Payer: MEDICARE

## 2019-10-05 VITALS
SYSTOLIC BLOOD PRESSURE: 139 MMHG | BODY MASS INDEX: 42.42 KG/M2 | RESPIRATION RATE: 11 BRPM | DIASTOLIC BLOOD PRESSURE: 69 MMHG | WEIGHT: 217.2 LBS | OXYGEN SATURATION: 100 % | TEMPERATURE: 98.1 F | HEART RATE: 78 BPM

## 2019-10-05 DIAGNOSIS — M79.602 LEFT ARM PAIN: ICD-10-CM

## 2019-10-05 DIAGNOSIS — R07.9 ACUTE CHEST PAIN: ICD-10-CM

## 2019-10-05 LAB
ANION GAP SERPL CALCULATED.3IONS-SCNC: 5 MMOL/L (ref 3–14)
BASOPHILS # BLD AUTO: 0 10E9/L (ref 0–0.2)
BASOPHILS NFR BLD AUTO: 0.4 %
BUN SERPL-MCNC: 21 MG/DL (ref 7–30)
CALCIUM SERPL-MCNC: 9 MG/DL (ref 8.5–10.1)
CHLORIDE SERPL-SCNC: 110 MMOL/L (ref 94–109)
CO2 SERPL-SCNC: 22 MMOL/L (ref 20–32)
CREAT SERPL-MCNC: 0.98 MG/DL (ref 0.52–1.04)
D DIMER PPP FEU-MCNC: 0.4 UG/ML FEU (ref 0–0.5)
DIFFERENTIAL METHOD BLD: ABNORMAL
EOSINOPHIL # BLD AUTO: 0.1 10E9/L (ref 0–0.7)
EOSINOPHIL NFR BLD AUTO: 2 %
ERYTHROCYTE [DISTWIDTH] IN BLOOD BY AUTOMATED COUNT: 13.7 % (ref 10–15)
GFR SERPL CREATININE-BSD FRML MDRD: 63 ML/MIN/{1.73_M2}
GLUCOSE SERPL-MCNC: 140 MG/DL (ref 70–99)
HCT VFR BLD AUTO: 30.9 % (ref 35–47)
HGB BLD-MCNC: 9.6 G/DL (ref 11.7–15.7)
IMM GRANULOCYTES # BLD: 0.1 10E9/L (ref 0–0.4)
IMM GRANULOCYTES NFR BLD: 0.7 %
INTERPRETATION ECG - MUSE: NORMAL
LYMPHOCYTES # BLD AUTO: 2.5 10E9/L (ref 0.8–5.3)
LYMPHOCYTES NFR BLD AUTO: 35.5 %
MCH RBC QN AUTO: 30.4 PG (ref 26.5–33)
MCHC RBC AUTO-ENTMCNC: 31.1 G/DL (ref 31.5–36.5)
MCV RBC AUTO: 98 FL (ref 78–100)
MONOCYTES # BLD AUTO: 0.6 10E9/L (ref 0–1.3)
MONOCYTES NFR BLD AUTO: 8.7 %
NEUTROPHILS # BLD AUTO: 3.8 10E9/L (ref 1.6–8.3)
NEUTROPHILS NFR BLD AUTO: 52.7 %
NRBC # BLD AUTO: 0 10*3/UL
NRBC BLD AUTO-RTO: 0 /100
PLATELET # BLD AUTO: 196 10E9/L (ref 150–450)
POTASSIUM SERPL-SCNC: 4.4 MMOL/L (ref 3.4–5.3)
RBC # BLD AUTO: 3.16 10E12/L (ref 3.8–5.2)
SODIUM SERPL-SCNC: 137 MMOL/L (ref 133–144)
TROPONIN I SERPL-MCNC: <0.015 UG/L (ref 0–0.04)
TROPONIN I SERPL-MCNC: <0.015 UG/L (ref 0–0.04)
WBC # BLD AUTO: 7.1 10E9/L (ref 4–11)

## 2019-10-05 PROCEDURE — 84484 ASSAY OF TROPONIN QUANT: CPT | Mod: 91 | Performed by: EMERGENCY MEDICINE

## 2019-10-05 PROCEDURE — 25000132 ZZH RX MED GY IP 250 OP 250 PS 637: Mod: GY | Performed by: EMERGENCY MEDICINE

## 2019-10-05 PROCEDURE — 73030 X-RAY EXAM OF SHOULDER: CPT | Mod: LT

## 2019-10-05 PROCEDURE — 36415 COLL VENOUS BLD VENIPUNCTURE: CPT | Performed by: EMERGENCY MEDICINE

## 2019-10-05 PROCEDURE — 99285 EMERGENCY DEPT VISIT HI MDM: CPT | Mod: 25

## 2019-10-05 PROCEDURE — 71046 X-RAY EXAM CHEST 2 VIEWS: CPT

## 2019-10-05 PROCEDURE — 84484 ASSAY OF TROPONIN QUANT: CPT | Performed by: EMERGENCY MEDICINE

## 2019-10-05 PROCEDURE — 85379 FIBRIN DEGRADATION QUANT: CPT | Performed by: EMERGENCY MEDICINE

## 2019-10-05 PROCEDURE — 85025 COMPLETE CBC W/AUTO DIFF WBC: CPT | Performed by: EMERGENCY MEDICINE

## 2019-10-05 PROCEDURE — 80048 BASIC METABOLIC PNL TOTAL CA: CPT | Performed by: EMERGENCY MEDICINE

## 2019-10-05 PROCEDURE — 93971 EXTREMITY STUDY: CPT | Mod: LT

## 2019-10-05 PROCEDURE — 93005 ELECTROCARDIOGRAM TRACING: CPT

## 2019-10-05 RX ORDER — NITROGLYCERIN 0.4 MG/1
0.4 TABLET SUBLINGUAL EVERY 5 MIN PRN
Status: DISCONTINUED | OUTPATIENT
Start: 2019-10-05 | End: 2019-10-05

## 2019-10-05 RX ORDER — ACETAMINOPHEN 500 MG
1000 TABLET ORAL ONCE
Status: DISCONTINUED | OUTPATIENT
Start: 2019-10-05 | End: 2019-10-05

## 2019-10-05 RX ORDER — TRAMADOL HYDROCHLORIDE 50 MG/1
50 TABLET ORAL
Status: COMPLETED | OUTPATIENT
Start: 2019-10-05 | End: 2019-10-05

## 2019-10-05 RX ADMIN — TRAMADOL HYDROCHLORIDE 50 MG: 50 TABLET, FILM COATED ORAL at 07:30

## 2019-10-05 NOTE — ED NOTES
Report received from DAVIN Christie. Pt is alert and in no acute distress. States pain to left arm 8/10 and 4/10 chest pain, denies SOB. Will reassess after pain meds given.

## 2019-10-05 NOTE — ED AVS SNAPSHOT
[facility] Emergency Department  [address]  [facility] [address]  Phone:  [phone]  Fax:  [phone]                                    Nena Tang   MRN: [id number]    Department:  [facility] Emergency Department   Date of Visit:  10/5/2019           After Visit Summary Signature Page    I have received my discharge instructions, and my questions have been answered. I have discussed any challenges I see with this plan with the nurse or doctor.    ..........................................................................................................................................  Patient/Patient Representative Signature      ..........................................................................................................................................  Patient Representative Print Name and Relationship to Patient    ..................................................               ................................................  Date                                   Time    ..........................................................................................................................................  Reviewed by Signature/Title    ...................................................              ..............................................  Date                                               Time          22EPIC Rev 08/18

## 2019-10-05 NOTE — ED NOTES
Rapid Assessment Note    History:   58 yo F from  with left arm and chest pain originating from the left hand, also pleuritic in nature, since she woke up 2 hours ago.    Exam:   Resting comfortably  Normal WOB  RRR    Plan of Care:   I evaluated the patient and developed an initial plan of care. I discussed this plan and explained that I, or one of my partners, would be returning to complete the evaluation.        Bar Alberts MD  10/05/19 [id number]

## 2019-10-05 NOTE — ED PROVIDER NOTES
Visit Date:   10/05/2019      CHIEF COMPLAINT:  Left arm and chest pain.      HISTORY OF PRESENT ILLNESS:  Nena Tang is a 59-year-old female who presents to the Emergency Department from a group home with concerns for left-sided arm and chest pain.  She notes it seems to come to the left arm and move up into the chest.  She denies it is a sharp pain, worse with movement.  She notes it is slightly worse with deep breathing.  She notes it feels like she has a hard time taking a deep breath because of the pain.  She denies any associated nausea, vomiting, diaphoresis.  She denies any dizziness.  She notes slight dry cough for the last 2 days that is not productive of sputum or blood.  She has never had this pain before.  She denies any leg pain or swelling.  She denies any arm swelling.  She denies any numbness or tingling.  She denies any fever or chills.  She actually has had a MI years ago and does not believe it feels similar to that.      ALLERGIES:  ACE INHIBITORS, METRONIDAZOLE, NSAIDs.      MEDICATIONS:  Metoprolol, atorvastatin, losartan, aspirin, sertraline, calcium carbonate, paliperidone, Ozempic, vitamin D3, gabapentin, topiramate, benztropine, NovoLog FlexPen, melatonin, Lantus, ranitidine, nystatin cream, Banophen, clonazepam at bedtime, Tylenol p.r.n., Senna p.r.n., Ventolin p.r.n., haloperidol p.r.n., prochlorperazine p.r.n., sumatriptan p.r.n., diclofenac gel.      PAST MEDICAL HISTORY:  IBS, type 2 diabetes mellitus, depression, CINDY, migraines, cocaine abuse in remission, coronary artery disease, acute respiratory failure with hypoxia, obesity, chronic low back pain, hypertension, history of heroin abuse, left cataract, uterine cancer, hyperlipidemia, osteopenia, Takotsubo cardiomyopathy.      PAST SURGICAL HISTORY:  Reviewed in Epic.      SOCIAL HISTORY:  The patient does not smoke.  She denies alcohol use.  She denies current drug use.  She does have a remote history of cocaine and heroin  abuse.  She lives in a group home.      FAMILY HISTORY:  No history of PE, DVT or coagulopathy known.  Reviewed in Epic.  Otherwise noncontributory.      REVIEW OF SYSTEMS:  As noted in history of present illness.  All other systems are reviewed and negative.      PHYSICAL EXAMINATION:   VITAL SIGNS:  Blood pressure is 146/75, temperature 98.1 Fahrenheit, pulse is 80, respiratory rate is 18, oxygen saturation 96% on room air.   GENERAL:  Reveals a large adult female sitting upright.   EYES:  Pupils are equal, round, react to light.  Conjunctivae are normal.   EARS, NOSE, NOSE, THROAT:  Moist mucous membranes.  Oropharynx is clear.   CARDIOVASCULAR:  Normal S1, S2.  Regular rate and rhythm.  No murmurs, rubs or gallops.   RESPIRATORY:  Clear to auscultation bilaterally.  No wheezes, rales or rhonchi.   GASTROINTESTINAL:  Abdomen soft, nontender, nondistended.   MUSCULOSKELETAL:  Moves all extremities equally.  No lower extremity edema, tenderness or asymmetry.  She is tender somewhat diffusely in the left arm, most prominent over the left anterior shoulder.  No palpable masses.  No edema.  No crepitus or bony deformity.  Able to range the left shoulder with mild discomfort.  Mild tenderness in the left chest wall.  No crepitus or edema.   SKIN:  Warm and dry.  No rash, lesions or ecchymoses on visible skin.   NEUROLOGIC:  Alert and oriented x3.  No focal neurologic findings.  Responds well to questions and commands.   PSYCHIATRIC:  Blunted affect.  Sleepy appearing.  Cooperative and otherwise friendly.      LABORATORY AND DIAGNOSTICS:      A 12-lead EKG shows normal sinus rhythm, ventricular rate 81.  Left axis deviation.  Septal infarct, age undetermined.  Otherwise normal intervals and axis.  This is similar to EKG from 9/23/2019.  This is a preliminary read per Dr. Pat Morin.      CBC with differential shows decreased red blood cell count at 3.16, decreased hemoglobin at 9.6, hematocrit decreased at 30.9, MCHC  decreased at 31.1, otherwise within normal limits.      Basic metabolic panel is normal aside from a chloride slightly elevated at 110, glucose elevated at 140.      Troponin (0552) is less than 0.015.      Troponin (0900) was less than 0.015.      D-dimer is normal at 0.4.      X-ray shoulder, left 3 views, impression:  No fractures are evident.  Normal glenohumeral alignment.  Acromioclavicular joint is unremarkable.  (Indra Kim MD).      Chest x-ray, PA and lateral, impression:  There are no acute infiltrates.  The cardiac silhouette is not enlarged.  Pulmonary vasculature is unremarkable.  (Dr. Kellee MD).      Ultrasound upper extremity venous duplex left, impression:  No evidence for deep venous thrombosis.  (Dr. Bar Goodson).      EMERGENCY DEPARTMENT INTERVENTIONS:  Tramadol 50 mg p.o.      EMERGENCY DEPARTMENT COURSE:      The patient was roomed and examined by me.      Peripheral IV placed.  EKG obtained.      The patient's blood drawn and sent to lab.      The patient sent for x-rays and ultrasound.      The patient reassessed.  Notes pain is improved.  Discussed findings of imaging and blood tests with her.  Discussed a second troponin test which she is agreeable to.  The patient continues to rest comfortably, awaiting test.      The patient reassessed.  Denies any new concerns.  Comfortable with plan for discharge.  Discharged home in stable condition.  Appropriate return precautions discussed. Followup instructions given.      MEDICAL DECISION MAKING:  Nena Tang is a 59-year-old female with multiple medical problems including a history of diabetes, hyperlipidemia and hypertension who presents to the Emergency Department with left-sided chest and arm pain.  Examination seems more consistent with musculoskeletal etiology, although given her underlying medical problems multiple other causes were entertained including pulmonary embolism, DVT, acute coronary syndrome, pneumothorax, pneumonia  or other lung pathology, etc.  Ultimately, her evaluation here was unremarkable.  No signs of acute ischemia or injury.  No evidence on chest x-ray of the aforementioned etiologies.  D-dimer was normal and she is otherwise low risk making CT chest unlikely to be helpful.  I did not suspect aortic dissection in this circumstance.  As she is feeling comfortable now, I feel comfortable discharging her home.  She is recommended to seek assessment in 3 days if ongoing symptoms.  She is recommended to return immediately to the Emergency Department if symptoms worsen.  All questions were answered prior to discharge.      DIAGNOSES:   1.  Chest pain, atypical.   2.  Left arm pain.         MORGAN HECTOR MD             D: 10/05/2019   T: 10/05/2019   MT: [id number]      Name:     ERIK BURNHAM   MRN:      [id number]        Account:      [id number]   :      1960           Visit Date:   10/05/2019      Document: [id number]

## 2019-10-11 ENCOUNTER — OFFICE VISIT (OUTPATIENT)
Dept: BEHAVIORAL HEALTH | Facility: CLINIC | Age: 59
End: 2019-10-11
Payer: MEDICARE

## 2019-10-11 ENCOUNTER — OFFICE VISIT (OUTPATIENT)
Dept: FAMILY MEDICINE | Facility: CLINIC | Age: 59
End: 2019-10-11
Payer: MEDICARE

## 2019-10-11 VITALS
DIASTOLIC BLOOD PRESSURE: 72 MMHG | SYSTOLIC BLOOD PRESSURE: 104 MMHG | WEIGHT: 242 LBS | HEART RATE: 93 BPM | BODY MASS INDEX: 47.26 KG/M2 | OXYGEN SATURATION: 95 % | TEMPERATURE: 97.4 F

## 2019-10-11 DIAGNOSIS — N18.30 TYPE 2 DIABETES MELLITUS WITH STAGE 3 CHRONIC KIDNEY DISEASE, WITH LONG-TERM CURRENT USE OF INSULIN (H): Primary | ICD-10-CM

## 2019-10-11 DIAGNOSIS — F25.1 SCHIZOAFFECTIVE DISORDER, DEPRESSIVE TYPE (H): Primary | ICD-10-CM

## 2019-10-11 DIAGNOSIS — I10 HTN, GOAL BELOW 140/90: ICD-10-CM

## 2019-10-11 DIAGNOSIS — F51.04 PSYCHOPHYSIOLOGICAL INSOMNIA: ICD-10-CM

## 2019-10-11 DIAGNOSIS — Z79.4 TYPE 2 DIABETES MELLITUS WITH STAGE 3 CHRONIC KIDNEY DISEASE, WITH LONG-TERM CURRENT USE OF INSULIN (H): Primary | ICD-10-CM

## 2019-10-11 DIAGNOSIS — E11.22 TYPE 2 DIABETES MELLITUS WITH STAGE 3 CHRONIC KIDNEY DISEASE, WITH LONG-TERM CURRENT USE OF INSULIN (H): Primary | ICD-10-CM

## 2019-10-11 DIAGNOSIS — F25.1 SCHIZOAFFECTIVE DISORDER, DEPRESSIVE TYPE (H): ICD-10-CM

## 2019-10-11 DIAGNOSIS — F43.10 POSTTRAUMATIC STRESS DISORDER: ICD-10-CM

## 2019-10-11 PROCEDURE — 90832 PSYTX W PT 30 MINUTES: CPT | Performed by: SOCIAL WORKER

## 2019-10-11 PROCEDURE — 99215 OFFICE O/P EST HI 40 MIN: CPT | Performed by: NURSE PRACTITIONER

## 2019-10-11 NOTE — PROGRESS NOTES
[facility]   October 11, 2019    Behavioral Health Clinician Progress Note    Patient Name: Nena Tang           Service Type:  Individual      Service Location:   Face to Face in Clinic     Session Start Time: 1:pm  Session End Time: 1:30pm      Session Length: 16 - 37      Attendees: Patient    Visit Activities (Refresh list every visit): [facility] Only     Diagnostic Assessment Date: 1-23-18    Treatment Plan Review Date: 12-  See Flowsheets for today's PHQ-9 and TAMIA-7 results  Previous PHQ-9:   PHQ-9 SCORE 3/13/2018 10/26/2018 5/7/2019   PHQ-9 Total Score - - -   PHQ-9 Total Score - - -   PHQ-9 Total Score 14 20 22     Previous TAMIA-7:   TAMIA-7 SCORE 2/3/2017 3/13/2018 10/26/2018   Total Score - - -   Total Score 0 17 19   Total Score - - -       ALEXANDRA LEVEL:  ALEXANDRA Score (Last Two) 8/30/2011 6/9/2014   ALEXANDRA Raw Score 42 37   Activation Score 66 49.9   ALEXANDRA Level 3 2       DATA  Extended Session (60+ minutes): No  Interactive Complexity: No  Crisis: No  [facility] Patient: No    Treatment Objective(s) Addressed in This Session:  Target Behavior(s): disease management/lifestyle changes mental health    Depressed Mood: Increase interest, engagement, and pleasure in doing things  Decrease frequency and intensity of feeling down, depressed, hopeless  Improve quantity and quality of night time sleep / decrease daytime naps  Identify negative self-talk and behaviors: challenge core beliefs, myths, and actions  Improve concentration, focus, and mindfulness in daily activities   Anxiety: will experience a reduction in anxiety and will develop more effective coping skills to manage anxiety symptoms  Grief / Loss: will engage in effective approach to address and resolve grief/loss issues and will process grief/loss issues in an adaptive manner  Thought Disturbance: will develop skills to more effectively manage symptoms, will develop more effective support systems and will continue to take  medications as prescribed    Current Stressors / Issues:    The patient was seen by [facility] [place] per the request of the PCP-she recently went to ED and had high blood pressure and she had conversation with PCP about these symptoms-regarding sleeping the patient reported that she has been sleeping during the day and she believes that the machine is not working correctly-she has an appointment to see the sleep specialist to get the machine fixed-no hallucination of the man and (I am the boss)-regarding mood the patient reported she has an anniversary coming up and she attends a grief groups-she attends the group every Sunday-this is a group she has some ownership too-she will ask a friend to take her to the group this Sunday-she is making connections with others-her son will send her husbands ashes to the sister of the -the patient is focused on losing weight-she agreed to remove candy from her diet and keep everything else-she goes to drop in center everyday-no thoughts of self harm-    Progress on Treatment Objective(s) / Homework:  Minimal progress - ACTION (Actively working towards change); Intervened by reinforcing change plan / affirming steps taken    Motivational Interviewing    MI Intervention: Expressed Empathy/Understanding, Supported Autonomy, Collaboration, Evocation, Permission to raise concern or advise, Open-ended questions, Reflections: simple and complex, Rolled with resistance: Emphasized patient autonomy, Simple reflection and Evoked patient agenda and Change talk (evoked)     Change Talk Expressed by the Patient: Desire to change Ability to change Reasons to change Need to change Committment to change Activation Taking steps    Provider Response to Change Talk: E - Evoked more info from patient about behavior change, A - Affirmed patient's thoughts, decisions, or attempts at behavior change, R - Reflected patient's change talk and S - Summarized patient's change talk statements      Care Plan review  completed: No    Medication Review:  No changes to current psychiatric medication(s)     Medication Compliance:  NA    Changes in Health Issues:   None reported     Chemical Use Review:   Substance Use: Chemical use reviewed, no active concerns identified      Tobacco Use: No current tobacco use.      Assessment: Current Emotional / Mental Status (status of significant symptoms):  Risk status (Self / Other harm or suicidal ideation)  Patient has had a history of suicidal ideation: yes  Patient denies current fears or concerns for personal safety.  Patient denies current or recent suicidal ideation or behaviors.  Patient denies current or recent homicidal ideation or behaviors.  Patient denies current or recent self injurious behavior or ideation.  Patient denies other safety concerns.  A safety and risk management plan has not been developed at this time, however patient was encouraged to call [place] [phone] should there be a change in any of these risk factors.    Appearance:   Appropriate   Eye Contact:   Good   Psychomotor Behavior: Normal   Attitude:   Cooperative   Orientation:   All  Speech   Rate / Production: Normal    Volume:  Normal   Mood:    Depressed  Normal  Affect:    Appropriate  Worrisome   Thought Content:  Clear   Thought Form:  Coherent  Goal Directed  Logical   Insight:    Fair     Diagnoses:  1. Schizoaffective disorder, depressive type (H)    2. Posttraumatic stress disorder        Collateral Reports Completed:  Not Applicable    Plan: (Homework, other):  Patient was given information about behavioral services and encouraged to schedule a follow up appointment with the clinic [facility] as needed to continue to work on the patient's use of DBT mindful coping with cognitive challenge with the psychotic symptoms and to work on stress management around her grieving process-also to help the patient with her behavioral needs to comply with treatment medical recommendations.  She was also given  information about mental health symptoms and treatment options .  CD Recommendations: Maintain Sobriety.    Richardson Lopez, [facility], [facility]                                                    Treatment Plan    Client's Name: Nena Tang  YOB: 1960    Date: 6-4-2019    DSM5 Diagnoses: (Sustained by DSM5 Criteria Listed Above)  Diagnoses:  295.70 (F25.0), Schizoaffective disorder, depressive type; 309.81 (F43.10) Posttraumatic Stress Disorder with panic specifier, history of chemical dependency issues (polysubstance dependence)  Psychosocial & Contextual Factors: Academics / Education - yes  Activities of Daily Living - yes  Financial management yes  Follow through with Medical recommendations - yes  Occupational / Vocational - yes  Social / Relational - yes, grief and loss  WHODAS Score: 30      Referral / Collaboration:  Referral to another professional/service is not indicated at this time..    Anticipated number of session or this episode of care: 20      MeasurableTreatment Goal(s) related to diagnosis / functional impairment(s)  Goal 1: Client will improve her ability to manage anxiety and mood states.     I will know I've met my goal when the man does not paralyze the me with fear.     Objective #A (Client Action)    Client will increase understanding of steps in the grief process.  Status: Continued - Date(s):  9-: able to remember the good time and able to remember these important people without wanting to die-she stated that she needs more work to be able to remember and not fall apart.     Intervention(s)  Therapist will teach emotional recognition/identification. teach the navigation of grieving encouraging acceptance and adjustment.    Objective #B  Client will Decrease frequency and intensity of feeling down, depressed, hopeless  Decrease thoughts that you'd be better off dead or of suicide / self-harm.  Status: Continued - Date(s):   9-: she does not think about harming the self  as much     Intervention(s)  Therapist will teach emotional regulation skills. with the use of mindful coping strategies and open communication of feelings and thoughts (getting it out to another person).    Objective #C  Client will identify at least 3 example(s) of how drinking has resulted in an experience that interferes with person values or goals.  Status: Completed - Date: She stated she was sober  for 25 years.    Intervention(s)  Therapist will teach the client how to perform a behavioral chain analysis. Process the experience of playing the tape forward to help with making the next right decision. .          Client has reviewed and agreed to the above plan.      Richardson Lopez, LICSW  June 4, 2019          ______________________________________________________________________

## 2019-10-11 NOTE — PROGRESS NOTES
SUBJECTIVE:                                                    Nena Tang is a 59 year old female who presents to clinic today for the following health issues:  [facility]: Richardson Lopez    Patient has paperwork today from her group home showing that she is current on her immunizations; 9/23/19- Hep /AB, Flu shot and TDap.    Diabetes Follow-up  Records for the group home show BG range . Patient has a couple of 300's from her eating candy and fast food.     How often are you checking your blood sugar? Four times daily.    What time of day are you checking your blood sugars (select all that apply)?  Before meals and After meals    Have you had any blood sugars above 200?  Yes, couple    Have you had any blood sugars below 70?  No    What symptoms do you notice when your blood sugar is low?  Shaky, Dizzy, Weak, Lethargy and Blurred vision    What concerns do you have today about your diabetes? none     Do you have any of these symptoms? (Select all that apply)  Excessive thirst and Weight gain     Have you had a diabetic eye exam in the last 12 months? Yes- Date of last eye exam: couple months ago    BP Readings from Last 2 Encounters:   10/05/19 139/69   09/25/19 118/66     Hemoglobin A1C (%)   Date Value   08/06/2019 6.6 (H)   05/15/2019 7.6 (H)     LDL Cholesterol Calculated (mg/dL)   Date Value   08/06/2019 81   08/06/2018 84       Diabetes Management Resources  Hypertension Follow-up  Patient denies any chest pain, shortness of breath, heart palpitations or syncopal episodes.   BP Readings from Last 3 Encounters:   10/11/19 104/72   10/05/19 139/69   09/25/19 118/66       Do you check your blood pressure regularly outside of the clinic? No     Are you following a low salt diet? No    Are your blood pressures ever more than 140 on the top number (systolic) OR more   than 90 on the bottom number (diastolic), for example 140/90?       How many servings of fruits and vegetables do you eat daily?  2-3    On  average, how many sweetened beverages do you drink each day (soda, juice, sweet tea, etc)?   1    How many days per week do you miss taking your medication? 0      Mental Health Follow up: Schizoaffective.   Patient reports that she has been going to grief group every Sunday after Sikhism. Reports that she has been also participating and likes the people in her group. Patient has an anniversary coming on- but is planning to stay strong and not let this take over her life. Planning on having a dinner with the grief group. She is planning on getting rid of her 's ashes- feels like she is ready to move on  at this time and feels good about this.     Status since last visit: improving    See PHQ-9 for current symptoms.  Other associated symptoms: None    Complicating factors: improving.   Significant life event:  No   Current substance abuse:  None  Anxiety or Panic symptoms:  No    Sleep - improved sleep and use of her CPAP. Using it nightly, does not have any hallucinations with her sleep. Feels great when she wakes up in the morning because of getting a good night sleep and also getting enough rest.   Appetite - good, is planning on going on a diet- discussed trying to cut back on candy of all kind for now and add on her other goals when she sees fit.   Exercise - [facility].     PHQ-9  English PHQ-9   Any Language               Problems taking medications regularly: No    Medication side effects: none    Diet: none but would like to start a diet    Social History     Tobacco Use     Smoking status: Never Smoker     Smokeless tobacco: Never Used   Substance Use Topics     Alcohol use: No     Frequency: Never     Comment: last month        Problem list and histories reviewed & adjusted, as indicated.  Additional history: as documented    Patient Active Problem List   Diagnosis     Osteopenia     Vitamin B12 deficiency without anemia     Hyperlipidemia LDL goal <100     Rotator cuff  syndrome     Type 2 diabetes mellitus with mild nonproliferative retinopathy (H)     Illiterate     Irritable bowel syndrome     overweight - BMI >35     Takotsubo cardiomyopathy     CAD (coronary artery disease)     Restless legs syndrome (RLS)     CINDY (obstructive sleep apnea)- mild AHI 10.3     Verbal auditory hallucination     Chronic low back pain     Schizoaffective disorder, depressive type (H)     Migraine headache     HTN, goal below 140/90     Health Care Home     Lumbago     Cervicalgia     Cocaine abuse, episodic (H)     Suicidal ideation     Esophageal reflux     Mild nonproliferative diabetic retinopathy (H)     Tardive dyskinesia     Alcohol use     Left cataract     Falls frequently     History of uterine cancer     Psychophysiological insomnia     Dysuria     Asymptomatic postmenopausal status     Abdominal pain, right lower quadrant     Infectious encephalopathy     Non-intractable vomiting with nausea     Thoughts of self harm     Gastroenteritis     Posttraumatic stress disorder     Cervical cancer screening     Type 2 diabetes mellitus with stage 3 chronic kidney disease, with long-term current use of insulin (H)     Positive AIDA (antinuclear antibody)     Hyperglycemia     Pneumonia     Past Surgical History:   Procedure Laterality Date     C OOPHORECTORMY FOR RAHEL, W/BX  1983    UTERINE     CATARACT IOL, RT/LT Bilateral 2017     COLONOSCOPY N/A 3/16/2017    Procedure: COLONOSCOPY;  Surgeon: Traci Gonzalez MD;  Location:  GI     Coronary CTA  5/21/2014     HYSTERECTOMY  1983    uterine cancer yearly pap's per provider.     LAPAROSCOPIC CHOLECYSTECTOMY  2008     PHACOEMULSIFICATION CLEAR CORNEA WITH STANDARD INTRAOCULAR LENS IMPLANT Left 5/5/2017    Procedure: PHACOEMULSIFICATION CLEAR CORNEA WITH STANDARD INTRAOCULAR LENS IMPLANT;  LEFT EYE PHACOEMULSIFICATION CLEAR CORNEA WITH STANDARD INTRAOCULAR LENS IMPLANT ;  Surgeon: Tyra Diaz MD;  Location: [facility]      PHACOEMULSIFICATION CLEAR CORNEA WITH STANDARD INTRAOCULAR LENS IMPLANT Right 2017    Procedure: PHACOEMULSIFICATION CLEAR CORNEA WITH STANDARD INTRAOCULAR LENS IMPLANT;  RIGHT EYE PHACOEMULSIFICATION CLEAR CORNEA WITH STANDARD INTRAOCULAR LENS IMPLANT;  Surgeon: Tyra Diaz MD;  Location:  [facility]     RELEASE TRIGGER FINGER  10/11/2012    Left thumb. Procedure: RELEASE TRIGGER FINGER;  LEFT THUMB TRIGGER RELEASE;  Surgeon: Tay Langley MD;  Location:  [facility]     RELEASE TRIGGER FINGER Right 2016    Procedure: RELEASE TRIGGER FINGER;  Surgeon: Albino Castañeda MD;  Location:  OR       Social History     Tobacco Use     Smoking status: Never Smoker     Smokeless tobacco: Never Used   Substance Use Topics     Alcohol use: No     Frequency: Never     Comment: last month     Family History   Problem Relation Age of Onset     Cancer Mother         BLADDER     Respiratory Mother         COPD     Gastrointestinal Disease Mother         CIRRHOSIS OF LI BOLIVAR     Alcohol/Drug Mother      Diabetes Mother      Hypertension Mother      Lipids Mother      C.A.D. Mother      Glaucoma Mother      Alcohol/Drug Sister      Mental Illness Sister      Alcohol/Drug Sister      Psychotic Disorder Sister      Cancer Maternal Grandmother         UNKNOWN TYPE     Cancer Brother         COLON     Cancer - colorectal Brother         IN HIS LATE 30S     Alcohol/Drug Brother          OF HEROIN OVERDOSE AT AGE 22 YRS     Macular Degeneration No family hx of            Current Outpatient Medications   Medication Sig Dispense Refill     aspirin (ASA) 81 MG EC tablet TAKE 1 TABLET (81MG) BY MOUTH DAILY 90 tablet 0     atorvastatin (LIPITOR) 80 MG tablet TAKE 1 TABLET (80MG) BY MOUTH DAILY 30 tablet 11     benztropine (COGENTIN) 0.5 MG tablet Take 2 tablets (1 mg) by mouth 2 times daily 120 tablet 3     blood glucose (NO BRAND SPECIFIED) test strip Use to test blood sugar  3 times daily or as directed. To accompany: Blood  Glucose Monitor Brands: per insurance. 100 strip 0     calcium carbonate (OS-ALLEN) 1500 (600 Ca) MG tablet Take 3 tablets (1,800 mg) by mouth daily 180 tablet 3     cholecalciferol (VITAMIN D3) 14368 units (1250 mcg) capsule TAKE 1 CAPSULE (50,000 UNITS) MONTHLY 4 capsule 3     clonazePAM (KLONOPIN) 0.5 MG tablet Take 1 tablet (0.5 mg) by mouth At Bedtime 30 tablet 0     Continuous Blood Gluc Sensor (DEXCOM G5 MOB/G4 PLAT SENSOR) MISC 1 Device every 7 days       Continuous Blood Gluc Transmit (DEXCOM G5 MOBILE TRANSMITTER) MISC 1 Device every 3 months       diphenhydrAMINE (BENADRYL) 25 MG tablet Take 1 tablet in am and 1 tablet at 2 pm. Until Rash resolves 90 tablet 0     gabapentin (NEURONTIN) 300 MG capsule Take 3 capsules (900 mg) by mouth 3 times daily 270 capsule 1     guaiFENesin (ROBITUSSIN) 100 MG/5ML liquid Take 10 mLs (200 mg) by mouth nightly as needed for cough 118 mL 0     haloperidol (HALDOL) 10 MG tablet Take 5 mg by mouth 2 times daily as needed for agitation       ibuprofen (ADVIL/MOTRIN) 200 MG tablet Take 200 mg by mouth every 8 hours as needed for mild pain       insulin aspart (NOVOLOG FLEXPEN) 100 UNIT/ML pen Inject 20 Units Subcutaneous 3 times daily (with meals) Once daily, can add additional 5 units if BGs are >500mg/dL.  Hold Insulin if BG are<70. 15 mL 11     insulin glargine (LANTUS SOLOSTAR PEN) 100 UNIT/ML pen Inject 60 Units Subcutaneous At Bedtime 15 mL 11     insulin pen needle (NOVOFINE 30) 30G X 8 MM miscellaneous USE 4 DAILY OR AS DIRECTED 400 each 0     ipratropium - albuterol 0.5 mg/2.5 mg/3 mL (DUONEB) 0.5-2.5 (3) MG/3ML neb solution Take 1 vial (3 mLs) by nebulization every 6 hours as needed for shortness of breath / dyspnea or wheezing 30 vial 0     lidocaine (LIDODERM) 5 % patch Place 1 patch onto the skin every 24 hours 10 patch 0     losartan (COZAAR) 50 MG tablet Take 1 tablet (50 mg) by mouth daily 90 tablet 3     melatonin 5 MG CAPS Take 2 capsules by mouth At Bedtime  180 capsule 3     metoprolol succinate ER (TOPROL-XL) 25 MG 24 hr tablet Take 1 tablet (25 mg) by mouth daily 90 tablet 1     nystatin (MYCOSTATIN) 457748 UNIT/GM external cream Apply topically 2 times daily 30 g 3     nystatin (MYCOSTATIN) 241555 UNIT/GM external powder Apply topically once as needed for dry skin 60 g 3     order for DME Equipment being ordered: CPAP Supplies. 1 each 0     order for DME Equipment being ordered: Depends. 30 each 1     order for DME Equipment being ordered: Freestyle [place] 1 Device 0     paliperidone ER (INVEGA) 9 MG 24 hr tablet Take 1 tablet (9 mg) by mouth every morning 90 tablet 1     polyethylene glycol (MIRALAX) powder Take 17 g (1 capful) by mouth daily as needed for constipation 500 g 3     ranitidine (ZANTAC) 300 MG tablet TAKE 1 TABLET (300MG) BY MOUTH AT BEDTIME 90 tablet 0     semaglutide (OZEMPIC) 1 MG/DOSE pen Inject 1 mg Subcutaneous every 7 days 6 mL 5     sertraline (ZOLOFT) 100 MG tablet Take 2 tablets (200 mg) by mouth daily 60 tablet 3     topiramate (TOPAMAX) 50 MG tablet Take 1.5 tablets (75 mg) by mouth 2 times daily 270 tablet 0     Allergies   Allergen Reactions     Imidazole Antifungals Hives     Tolerates diflucan     Ketoprofen Itching     Pruritis to topical     Lisinopril Hives     Metformin Other (See Comments)     Patient hospitalized for lactic acidosis - admitting provider suspectd caused by metformin     Metronidazole Hives     Posaconazole Hives     Tolerates diflucan     Recent Labs   Lab Test 10/05/19  0552 09/23/19  1814 09/14/19  1915 08/06/19  1043  07/28/19  1141 05/29/19  2226 05/24/19  0916  05/15/19  1822  12/20/18  1532  08/06/18  1038  06/27/17  1358   A1C  --   --   --  6.6*  --   --   --   --   --  7.6*  --  6.4*  --  6.4*   < > 7.0*   LDL  --   --   --  81  --   --   --   --   --   --   --   --   --  84  --  64   HDL  --   --   --  39*  --   --   --   --   --   --   --   --   --  46*  --  37*   TRIG  --   --   --  131  --   --    --   --   --   --   --   --   --  215*  --  267*   ALT  --   --  27  --   --  23 32 30  --  33   < >  --    < >  --    < > 32   CR 0.98 0.98 0.90  --    < > 1.13* 0.89 0.87   < > 0.93   < > 0.99   < >  --    < > 0.78   GFRESTIMATED 63 63 70  --    < > 53* 71 73   < > 67   < > 63   < >  --    < > 76   GFRESTBLACK 73 73 81  --    < > 62 82 85   < > 78   < > 73   < >  --    < > >90   GFR Calc     POTASSIUM 4.4 4.1 4.7  --    < > 4.1 4.0 4.0   < > 4.3   < > 4.2   < >  --    < > 4.3   TSH  --   --   --   --   --   --   --  1.62  --   --   --  1.98  --   --    < >  --     < > = values in this interval not displayed.      BP Readings from Last 3 Encounters:   10/05/19 139/69   09/25/19 118/66   09/23/19 (!) 157/77    Wt Readings from Last 3 Encounters:   10/05/19 98.5 kg (217 lb 3.2 oz)   09/23/19 104.3 kg (230 lb)   09/10/19 109.8 kg (242 lb)        Labs reviewed in EPIC  Problem list, Medication list, Allergies, and Medical/Social/Surgical histories reviewed in [facility] and updated as appropriate.     ROS: Constitutional, neuro, ENT, endocrine, pulmonary, cardiac, gastrointestinal, genitourinary, musculoskeletal, integument and psychiatric systems are negative, except as otherwise noted above in the HPI.   OBJECTIVE:                                                    /72   Pulse 93   Temp 97.4  F (36.3  C)   Wt 109.8 kg (242 lb)   LMP 01/06/2015   SpO2 95%   BMI 47.26 kg/m    Body mass index is 47.26 kg/m .    GENERAL: healthy, alert and no distress  EYES: Eyes grossly normal to inspection, PERRL and conjunctivae and sclerae normal  RESP: lungs clear to auscultation - no rales, rhonchi or wheezes  CV: regular rate and rhythm, normal S1 S2, no S3 or S4, no murmur, click or rub, no peripheral edema and peripheral pulses strong  MS: no gross musculoskeletal defects noted, no edema  NEURO: Normal strength and tone, mentation intact and speech normal    Mental Status  Assessment:  Appearance:   Appropriate   Eye Contact:   Good   Psychomotor Behavior: Normal   Attitude:   Cooperative   Orientation:   All  Speech   Rate / Production: Normal    Volume:  Normal   Mood:    Normal  Affect:    Appropriate   Thought Content:  Clear   Thought Form:  Coherent  Logical   Insight:    Fair   Attention Span and Concentration:  appropriate.   Recent and Remote Memory:  intact  Fund of Knowledge: appropriate  Muscle Strength and Tone: normal   Suicidal Ideation: reports no thoughts, no intention  Hallucination: No  Paranoid-No  Manic-No  Panic-No  Self harm-No      See [facility] notes     Diagnostic Test Results:  Results for orders placed or performed during the hospital encounter of 10/05/19   Chest XR,  PA & LAT    Narrative    CHEST TWO VIEWS 10/5/2019 7:19 AM     HISTORY: Dyspnea, chest pain.    COMPARISON: 9/14/2019       Impression    IMPRESSION: There are no acute infiltrates. The cardiac silhouette is  not enlarged. Pulmonary vasculature is unremarkable.    ADELITA CHAPA MD   XR Shoulder Left G/E 3 Views    Narrative    XR SHOULDER LT G/E 3 VW 10/5/2019 7:21 AM     HISTORY: pain    COMPARISON: 1/18/2019 bilateral shoulder      Impression    IMPRESSION: No fractures are evident. Normal glenohumeral alignment.  The acromioclavicular joint is unremarkable.     WILFRID HARDWICK MD   US Upper Extremity Venous Duplex Left    Narrative    ULTRASOUND VENOUS UPPER EXTREMITY LEFT 10/5/2019 7:09 AM     HISTORY: Pain    COMPARISON: None.    TECHNIQUE: Ultrasound gray scale, Color Doppler flow, and spectral  Doppler waveform analysis performed.    FINDINGS: There are no intraluminal filling defects. The left internal  jugular, axillary, cephalic, basilic, brachial, radial, and ulnar  veins demonstrate normal compressibility. The left internal jugular,  innominate, subclavian, and axillary veins have normal venous  waveforms.      Impression    IMPRESSION: No evidence for deep venous thrombosis.    ADELITA  MD EDUARD   CBC + differential   Result Value Ref Range    WBC 7.1 4.0 - 11.0 10e9/L    RBC Count 3.16 (L) 3.8 - 5.2 10e12/L    Hemoglobin 9.6 (L) 11.7 - 15.7 g/dL    Hematocrit 30.9 (L) 35.0 - 47.0 %    MCV 98 78 - 100 fl    MCH 30.4 26.5 - 33.0 pg    MCHC 31.1 (L) 31.5 - 36.5 g/dL    RDW 13.7 10.0 - 15.0 %    Platelet Count 196 150 - 450 10e9/L    Diff Method Automated Method     % Neutrophils 52.7 %    % Lymphocytes 35.5 %    % Monocytes 8.7 %    % Eosinophils 2.0 %    % Basophils 0.4 %    % Immature Granulocytes 0.7 %    Nucleated RBCs 0 0 /100    Absolute Neutrophil 3.8 1.6 - 8.3 10e9/L    Absolute Lymphocytes 2.5 0.8 - 5.3 10e9/L    Absolute Monocytes 0.6 0.0 - 1.3 10e9/L    Absolute Eosinophils 0.1 0.0 - 0.7 10e9/L    Absolute Basophils 0.0 0.0 - 0.2 10e9/L    Abs Immature Granulocytes 0.1 0 - 0.4 10e9/L    Absolute Nucleated RBC 0.0    Basic metabolic panel (BMP)   Result Value Ref Range    Sodium 137 133 - 144 mmol/L    Potassium 4.4 3.4 - 5.3 mmol/L    Chloride 110 (H) 94 - 109 mmol/L    Carbon Dioxide 22 20 - 32 mmol/L    Anion Gap 5 3 - 14 mmol/L    Glucose 140 (H) 70 - 99 mg/dL    Urea Nitrogen 21 7 - 30 mg/dL    Creatinine 0.98 0.52 - 1.04 mg/dL    GFR Estimate 63 >60 mL/min/[1.73_m2]    GFR Estimate If Black 73 >60 mL/min/[1.73_m2]    Calcium 9.0 8.5 - 10.1 mg/dL   D dimer quantitative   Result Value Ref Range    D Dimer 0.4 0.0 - 0.50 ug/ml FEU   Troponin I   Result Value Ref Range    Troponin I ES <0.015 0.000 - 0.045 ug/L   Troponin I (now)   Result Value Ref Range    Troponin I ES <0.015 0.000 - 0.045 ug/L   EKG 12 lead   Result Value Ref Range    Interpretation ECG Click View Image link to view waveform and result      *Note: Due to a large number of results and/or encounters for the requested time period, some results have not been displayed. A complete set of results can be found in Results Review.        ASSESSMENT/PLAN:                                                    (E11.22,  N18.3,   "Z79.4) Type 2 diabetes mellitus with stage 3 chronic kidney disease, with long-term current use of insulin (H)  (primary encounter diagnosis)  Comment: Uncontrolled diabetes. Patient's reported blood sugar numbers seem to be improving overall. Patient has less numbers in the 300's and none in the 400's. Patient reports compliance with her medications and her insulin.  Lab Results   Component Value Date    A1C 6.6 08/06/2019    A1C 7.6 05/15/2019    A1C 6.4 12/20/2018    A1C 6.4 08/06/2018    A1C 6.2 05/22/2018     Plan: Schedule with MTM to go over Dexcom usage for blood sugar checks.   -call clinic with any questions or concerns.     (I10) HTN, goal below 140/90  Comment: Patient denies any symptoms.   Plan: Continue with atorvastatin, Asprin and metoprolol.     (F25.1) Schizoaffective disorder, depressive type (H)  Comment: Patient reports that she is doing well and feels like she is in a good place right now. Patient states that she has been improving with her overall mood and how she is looking at things right now especially with the new grief group which has been helping her with dealing with the deaths in her family and the anniversaries.   Plan: Encouraged patient to continue with the Grief group and also with individual therapy to help continue with improving her mood and also her personal losses.   -Continue with medications as prescribed.     (F51.04) Psychophysiological insomnia  Comment: Patient reports improved insomnia with the clonazepam. Patient denies any sightings of the \"man\" she used to see at night when she would put on her CPAP machine or when the lights were off.   Plan: Continue with clonazepam as prescribed per Dr. Brothers (psychiatry)      Patient Instructions   -Please fax the medication list for Nena to the clinic.   -Patient needs a follow-up appointment with rheumatology.   -Call clinic with any questions or concerns.     I spent Greater than 40 minutes was spent face to face with " Nena Tang, with greater than 50 % in counselling and consultation about diabetes, hypertension, insomnia, schizoaffective and insomnia.     ART Fay [facility]

## 2019-10-11 NOTE — PATIENT INSTRUCTIONS
-Please fax the medication list for Nena to the clinic.   -Patient needs a follow-up appointment with rheumatology.   -Call clinic with any questions or concerns.

## 2019-10-16 ENCOUNTER — OFFICE VISIT (OUTPATIENT)
Dept: SLEEP MEDICINE | Facility: CLINIC | Age: 59
End: 2019-10-16
Payer: MEDICARE

## 2019-10-16 VITALS
WEIGHT: 242 LBS | OXYGEN SATURATION: 94 % | DIASTOLIC BLOOD PRESSURE: 68 MMHG | BODY MASS INDEX: 47.51 KG/M2 | SYSTOLIC BLOOD PRESSURE: 117 MMHG | HEIGHT: 60 IN | HEART RATE: 86 BPM

## 2019-10-16 DIAGNOSIS — G47.33 OSA ON CPAP: Primary | ICD-10-CM

## 2019-10-16 DIAGNOSIS — F25.0 SCHIZOAFFECTIVE DISORDER, BIPOLAR TYPE (H): ICD-10-CM

## 2019-10-16 PROCEDURE — 99214 OFFICE O/P EST MOD 30 MIN: CPT | Performed by: INTERNAL MEDICINE

## 2019-10-16 ASSESSMENT — MIFFLIN-ST. JEOR: SCORE: 1599.2

## 2019-10-16 NOTE — NURSING NOTE
Chief Complaint   Patient presents with     RECHECK     f/u pressure change pap       Initial /68   Pulse 86   Ht 1.524 m (5')   Wt 109.8 kg (242 lb)   LMP 01/06/2015   SpO2 91%   BMI 47.26 kg/m   Estimated body mass index is 47.26 kg/m  as calculated from the following:    Height as of this encounter: 1.524 m (5').    Weight as of this encounter: 109.8 kg (242 lb).    Medication Reconciliation: complete   Low oxygen reading after walking to room         DME: yes airsense 10    ESS 13

## 2019-10-16 NOTE — TELEPHONE ENCOUNTER
Controlled Substance Refill Request for clonazePAM (KLONOPIN) 0.5 MG tablet  Problem List Complete:  Yes   checked in past 3 months?  No, route to RN

## 2019-10-16 NOTE — TELEPHONE ENCOUNTER
Clonazepam 0.5mg      Last Written Prescription Date:  9/16/19  Last Fill Quantity: 30,   # refills: 0  Last Office Visit: 10/11/19  Future Office visit:    Next 5 appointments (look out 90 days)    Oct 25, 2019  3:30 PM CDT  Return Visit with Richardson Lopez [facility] ([facility]) [address]  [address]  [facility] [address]  [phone]   Nov 05, 2019  1:30 PM CST  Return Visit with ART Arias [facility] ([facility]) [address]  [address]  [facility] [address]  [phone]   Nov 05, 2019  1:30 PM CST  Return Visit with Richardson Lopez, [facility] ([facility]) [address]  [address]  [facility] [address]  [phone]   Nov 05, 2019  1:30 PM CST  Office Visit with Eugenia De Jesus [facility] [place] ([facility]) [address] [place] [address]  [address]  [facility] [address]  [phone]           Routing refill request to provider for review/approval because:  Drug not on the [facility], [facility] or  Health refill protocol or controlled substance      Last script in [place]  was written by PCP and was filled on 9/17/19 . Routed to PCP for review. Sakshi Viera RN October 16, 2019 3:19 PM

## 2019-10-16 NOTE — PROGRESS NOTES
PMH of irritable bowel syndrome, diabetes mellitus type 2, dyslipidemia, major depressive disorder, vitamin D deficiency, history of Takotsubo cardiomyopathy, migraine headaches, coronary artery disease with stent placement, and hypertension.    1/10/17 PSGresults:  Diagnostic part:  Respiration: Severe Obstructive Sleep Apnea with sleep related hypoxemia was present. Intermittent, moderate to loud snoring observed. The limited REM sleep without REM supine could underestimate the true severity of the condition.      Events - During the diagnostic portion of the study, the polysomnogram revealed a presence of 18 obstructive, 1 central, and 0 mixed apneas resulting in an apnea index of 6.2 events per hour.  There were 171 hypopneas resulting in a hypopnea index of 56.2 events per hour.  The combined apnea/hypopnea index was 62.5 events per hour.  The REM AHI was 43.6 events per hour.  The supine AHI was 90.9 events per hour.  The RERA index was - events per hour.  The RDI was 62.5 events per hour.     Snoring - was reported as moderate to loud and intermittent.    Respiratory rate and pattern - was notable for normal respiratory rate and pattern.    Sustained Sleep Associated Hypoventilation - Transcutaneous carbon dioxide monitoring was not used, however significant hypoventilation was not suggested by oximetry.  Sleep Associated Hypoxemia - (Greater than 5 minutes O2 sat below 89%) was not present. Baseline oxygen saturation was 90.7%. Lowest oxygen saturation was 70.3%. Time spent less than or equal to 88% was 51.4 minutes.  Time spent less than or equal to 89% was 65.7 minutes.    Treatment portion of the study:  The treatment portion of the study showed adequate CPAP titration at a final pressure of 13 cmH2O was obtained. No REM supine obtained during the final pressure.  STM note from 6/2019:  Patient contacted regarding PAP usage that has either dropped off below an average of 20 minutes per night or device  has stopped reporting into Epic or vendor site.     Diagnostic AHI: 62.5   PSG     Patient still has device : Yes     Last date device called in 6/19/2019     Last usage date 5/18/2018

## 2019-10-16 NOTE — PATIENT INSTRUCTIONS
Your blood pressure was checked while you were in clinic today.  Please read the guidelines below about what these numbers mean and what you should do about them.  Your systolic blood pressure is the top number.  This is the pressure when the heart is pumping.  Your diastolic blood pressure is the bottom number.  This is the pressure in between beats.  If your systolic blood pressure is less than 120 and your diastolic blood pressure is less than 80, then your blood pressure is normal. There is nothing more that you need to do about it  If your systolic blood pressure is 120-139 or your diastolic blood pressure is 80-89, your blood pressure may be higher than it should be.  You should have your blood pressure re-checked within a year by a primary care provider.  If your systolic blood pressure is 140 or greater or your diastolic blood pressure is 90 or greater, you may have high blood pressure.  High blood pressure is treatable, but if left untreated over time it can put you at risk for heart attack, stroke, or kidney failure.  You should have your blood pressure re-checked by a primary care provider within the next four weeks.  Your BMI is There is no height or weight on file to calculate BMI.  Weight management is a personal decision.  If you are interested in exploring weight loss strategies, the following discussion covers the approaches that may be successful. Body mass index (BMI) is one way to tell whether you are at a healthy weight, overweight, or obese. It measures your weight in relation to your height.  A BMI of 18.5 to 24.9 is in the healthy range. A person with a BMI of 25 to 29.9 is considered overweight, and someone with a BMI of 30 or greater is considered obese. More than two-thirds of American adults are considered overweight or obese.  Being overweight or obese increases the risk for further weight gain. Excess weight may lead to heart disease and diabetes.  Creating and following plans for  healthy eating and physical activity may help you improve your health.  Weight control is part of healthy lifestyle and includes exercise, emotional health, and healthy eating habits. Careful eating habits lifelong are the mainstay of weight control. Though there are significant health benefits from weight loss, long-term weight loss with diet alone may be very difficult to achieve- studies show long-term success with dietary management in less than 10% of people. Attaining a healthy weight may be especially difficult to achieve in those with severe obesity. In some cases, medications, devices and surgical management might be considered.  What can you do?  If you are overweight or obese and are interested in methods for weight loss, you should discuss this with your provider.     Consider reducing daily calorie intake by 500 calories.     Keep a food journal.     Avoiding skipping meals, consider cutting portions instead.    Diet combined with exercise helps maintain muscle while optimizing fat loss. Strength training is particularly important for building and maintaining muscle mass. Exercise helps reduce stress, increase energy, and improves fitness. Increasing exercise without diet control, however, may not burn enough calories to loose weight.       Start walking three days a week 10-20 minutes at a time    Work towards walking thirty minutes five days a week     Eventually, increase the speed of your walking for 1-2 minutes at time    In addition, we recommend that you review healthy lifestyles and methods for weight loss available through the National Institutes of Health patient information sites:  http://win.niddk.nih.gov/publications/index.htm    And look into health and wellness programs that may be available through your health insurance provider, employer, local community center, or orxane club.

## 2019-10-17 RX ORDER — CLONAZEPAM 0.5 MG/1
0.5 TABLET ORAL AT BEDTIME
Qty: 30 TABLET | Refills: 0 | Status: SHIPPED | OUTPATIENT
Start: 2019-10-17 | End: 2019-11-15

## 2019-10-21 ENCOUNTER — DOCUMENTATION ONLY (OUTPATIENT)
Dept: SLEEP MEDICINE | Facility: CLINIC | Age: 59
End: 2019-10-21

## 2019-10-21 ENCOUNTER — THERAPY VISIT (OUTPATIENT)
Dept: SLEEP MEDICINE | Facility: CLINIC | Age: 59
End: 2019-10-21
Payer: MEDICARE

## 2019-10-21 DIAGNOSIS — F25.0 SCHIZOAFFECTIVE DISORDER, BIPOLAR TYPE (H): ICD-10-CM

## 2019-10-21 DIAGNOSIS — G47.33 OSA ON CPAP: ICD-10-CM

## 2019-10-21 DIAGNOSIS — M54.2 CERVICALGIA: ICD-10-CM

## 2019-10-21 DIAGNOSIS — I25.119 CORONARY ARTERY DISEASE INVOLVING NATIVE HEART WITH ANGINA PECTORIS, UNSPECIFIED VESSEL OR LESION TYPE (H): ICD-10-CM

## 2019-10-21 DIAGNOSIS — G25.9 EXTRAPYRAMIDAL AND MOVEMENT DISORDER: ICD-10-CM

## 2019-10-21 LAB
BASE DEFICIT BLDA-SCNC: 3 MMOL/L
HCO3 BLD-SCNC: 23 MMOL/L (ref 21–28)
O2/TOTAL GAS SETTING VFR VENT: ABNORMAL %
OXYHGB MFR BLD: 92 % (ref 92–100)
PCO2 BLD: 42 MM HG (ref 35–45)
PH BLD: 7.34 PH (ref 7.35–7.45)
PO2 BLD: 71 MM HG (ref 80–105)

## 2019-10-21 PROCEDURE — 82805 BLOOD GASES W/O2 SATURATION: CPT | Performed by: INTERNAL MEDICINE

## 2019-10-21 PROCEDURE — 95811 POLYSOM 6/>YRS CPAP 4/> PARM: CPT | Performed by: INTERNAL MEDICINE

## 2019-10-21 PROCEDURE — 36600 WITHDRAWAL OF ARTERIAL BLOOD: CPT | Performed by: INTERNAL MEDICINE

## 2019-10-21 RX ORDER — SERTRALINE HYDROCHLORIDE 100 MG/1
200 TABLET, FILM COATED ORAL DAILY
Qty: 60 TABLET | Refills: 3 | Status: SHIPPED | OUTPATIENT
Start: 2019-10-21 | End: 2020-05-18

## 2019-10-21 RX ORDER — METOPROLOL SUCCINATE 25 MG/1
25 TABLET, EXTENDED RELEASE ORAL DAILY
Qty: 90 TABLET | Refills: 1 | Status: SHIPPED | OUTPATIENT
Start: 2019-10-21 | End: 2020-05-18

## 2019-10-21 RX ORDER — BENZTROPINE MESYLATE 0.5 MG/1
1 TABLET ORAL 2 TIMES DAILY
Qty: 120 TABLET | Refills: 3 | Status: ON HOLD | OUTPATIENT
Start: 2019-10-21 | End: 2019-12-31

## 2019-10-21 NOTE — TELEPHONE ENCOUNTER
Cogentin, metoprolol and zoloft prescriptions approved per [facility] Refill Protocol.      Gabapentin 100 mg      Last Written Prescription Date:  8/26/19  Last Fill Quantity: 270,   # refills: 1  Last Office Visit: 10/11/19  Future Office visit:    Next 5 appointments (look out 90 days)    Oct 25, 2019  3:30 PM CDT  Return Visit with BIN Mondragon  [facility] ([facility]) [address]  [address]  [facility] [address]  [phone]   Nov 05, 2019  1:30 PM CST  Return Visit with ART Arias CNP  [facility] ([facility]) [address]  [address]  [facility] [address]  [phone]   Nov 05, 2019  1:30 PM CST  Return Visit with Richardson Lopez, [facility] ([facility]) [address]  [address] [place] [id number] [phone]   Nov 05, 2019  1:30 PM CST  Office Visit with Eugenia De Jesus [facility] [place] ([facility]) [address] [place] [address]  [address] [place] [id number] [phone]           Routing refill request to provider for review/approval because:  Drug not on the [facility], [facility] or [facility] refill protocol or controlled substance      Last script in [place]  was written by  PCP and was filled on 9/24/19 . Routed to PCP for review. Sakshi Viera RN October 21, 2019 4:57 PM

## 2019-10-21 NOTE — TELEPHONE ENCOUNTER
Requested Prescriptions   Pending Prescriptions Disp Refills     benztropine (COGENTIN) 0.5 MG tablet 120 tablet 3     Sig: Take 2 tablets (1 mg) by mouth 2 times daily   Last Written Prescription Date:  07/02/2019  Last Fill Quantity: 120,  # refills: 3  Last office visit: 10/11/2019 with prescribing provider:     Future Office Visit:   Next 5 appointments (look out 90 days)    Oct 25, 2019  3:30 PM CDT  Return Visit with BIN Mondragon  [facility] ([facility]) [address] [place], [place] [address]  [address] [place] [id number]  [phone]   Nov 05, 2019  1:30 PM CST  Return Visit with ART Arias CNP  [facility] ([facility]) [address]  [address] [place] [address]  [phone]   Nov 05, 2019  1:30 PM CST  Return Visit with BIN Mondragon  [facility] ([facility]) [address]  [address] [place] [address]  [phone]   Nov 05, 2019  1:30 PM CST  Office Visit with Eugenia De Jesus [facility] ([facility]) [address] [place] [address]  [address] [place] [phone]  [phone]             Antiparkinson's Agents Protocol Passed - 10/21/2019  3:09 PM        Passed - Blood pressure under 140/90 in past 12 months     BP Readings from Last 3 Encounters:   10/16/19 117/68   10/11/19 104/72   10/05/19 139/69                 Passed - CBC on record in past 12 months     Recent Labs   Lab Test 10/05/19  0552   WBC 7.1   RBC 3.16*   HGB 9.6*   HCT 30.9*                    Passed - ALT on record in past 12 months         Recent Labs   Lab Test 09/14/19  1915   ALT 27             Passed - Serum Creatinine on file in past 12 months     Recent Labs   Lab Test 10/05/19  0552  05/15/19  1834   CR 0.98   < >  --    CREAT  --    "--  0.9    < > = values in this interval not displayed.             Passed - Recent (12 mo) or future (30 days) visit within the authorizing provider's specialty     Patient has had an office visit with the authorizing provider or a provider within the authorizing providers department within the previous 12 mos or has a future within next 30 days. See \"Patient Info\" tab in inbasket, or \"Choose Columns\" in Meds & Orders section of the refill encounter.              Passed - Medication is active on med list        Passed - Patient is age 18 or older        Passed - No active pregnancy on record        Passed - No positive pregnancy test in the past 12 months        metoprolol succinate ER (TOPROL-XL) 25 MG 24 hr tablet 90 tablet 1     Sig: Take 1 tablet (25 mg) by mouth daily   Last Written Prescription Date: 05/15/2019  Last Fill Quantity: 90,  # refills: 1  Last office visit: 10/11/2019 with prescribing provider:    Future Office Visit:   Next 5 appointments (look out 90 days)    Oct 25, 2019  3:30 PM CDT  Return Visit with Richardson Lopez [facility] ([facility]) [address] [place] [address]  [address] [place] [id number] [phone]   Nov 05, 2019  1:30 PM CST  Return Visit with ART Arias [facility] ([facility]) [address]   [address] [place] [id number] [phone]   Nov 05, 2019  1:30 PM CST  Return Visit with Richardson Lopez [facility] ([facility]) [address]   [address]  [facility] [address] [phone]   Nov 05, 2019  1:30 PM CST  Office Visit with Eugenia De Jesus [facility] [place] ([facility]) [address] [place] [address]  [address] [place] [address] [phone]             Beta-Blockers Protocol " "Passed - 10/21/2019  3:09 PM        Passed - Blood pressure under 140/90 in past 12 months     BP Readings from Last 3 Encounters:   10/16/19 117/68   10/11/19 104/72   10/05/19 139/69                 Passed - Patient is age 6 or older        Passed - Recent (12 mo) or future (30 days) visit within the authorizing provider's specialty     Patient has had an office visit with the authorizing provider or a provider within the authorizing providers department within the previous 12 mos or has a future within next 30 days. See \"Patient Info\" tab in inbasket, or \"Choose Columns\" in Meds & Orders section of the refill encounter.              Passed - Medication is active on med list        sertraline (ZOLOFT) 100 MG tablet 60 tablet 3     Sig: Take 2 tablets (200 mg) by mouth daily   Last Written Prescription Date:  07/02/2019  Last Fill Quantity: 60,  # refills: 3  Last office visit: 10/11/2019 with prescribing provider:    Future Office Visit:   Next 5 appointments (look out 90 days)    Oct 25, 2019  3:30 PM CDT  Return Visit with Richardson Lopez, [facility] ([facility]) [address]   [address]  [facility] [address] [phone]   Nov 05, 2019  1:30 PM CST  Return Visit with ART Arias [facility] ([facility]) [address]  [address]  [facility] [address]  [phone]   Nov 05, 2019  1:30 PM CST  Return Visit with Richardson Lopez [facility] ([facility]) [address]  [address]  [facility] [address]  [phone]   Nov 05, 2019  1:30 PM CST  Office Visit with Eugenia De Jesus [facility] [place] ([facility]) [address] [place] [address]  [address]  [facility] [address] [phone]             SSRIs Protocol Passed - " "10/21/2019  3:09 PM        Passed - Recent (12 mo) or future (30 days) visit within the authorizing provider's specialty     Patient has had an office visit with the authorizing provider or a provider within the authorizing providers department within the previous 12 mos or has a future within next 30 days. See \"Patient Info\" tab in inbasket, or \"Choose Columns\" in Meds & Orders section of the refill encounter.              Passed - Medication is active on med list        Passed - Patient is age 18 or older        Passed - No active pregnancy on record        Passed - No positive pregnancy test in last 12 months              Controlled Substance Refill Request for gabapentin (NEURONTIN) 300 MG capsule  Problem List Complete:  Yes   checked in past 3 months?  No, route to RN        "

## 2019-10-22 RX ORDER — GABAPENTIN 300 MG/1
900 CAPSULE ORAL 3 TIMES DAILY
Qty: 270 CAPSULE | Refills: 1 | Status: SHIPPED | OUTPATIENT
Start: 2019-10-22 | End: 2019-12-03

## 2019-10-22 NOTE — PATIENT INSTRUCTIONS
[facility]    1. Your sleep study will be reviewed by a sleep physician within the next few days.     2. Please follow up in the sleep clinic as scheduled, or, make an appointment with your sleep provider to be seen within two weeks to discuss the results of the sleep study.    3. If you have any questions or problems with your treatment plan, please contact your sleep clinic provider at [phone] to further manage your condition.    4. Please review your attached medication list, and, at your follow-up appointment advise your sleep clinic provider about any changes.    5. Go to [email or web address] for more information about your sleep problems.    Blanka Quevedo, RPSGT  October 22, 2019

## 2019-10-22 NOTE — PROGRESS NOTES
Completed a split night PSG per provider order.    Preliminary AHI >10, as per PSG order.  A final therapeutic PAP pressure was not achieved supine, but did seem to be achieved laterally.    Supine REM was seen on highest pressure applied but did not appear to be adequate.  Pressures did appear to be largely effective through lateral REM.      Patient reports feeling refreshed in AM.

## 2019-10-24 DIAGNOSIS — M81.8 OTHER OSTEOPOROSIS WITHOUT CURRENT PATHOLOGICAL FRACTURE: ICD-10-CM

## 2019-10-24 NOTE — TELEPHONE ENCOUNTER
Prescription approved per [facility] Refill Protocol. Sakshi Viera RN October 24, 2019 2:24 PM

## 2019-10-25 ENCOUNTER — OFFICE VISIT (OUTPATIENT)
Dept: FAMILY MEDICINE | Facility: CLINIC | Age: 59
End: 2019-10-25
Payer: MEDICARE

## 2019-10-25 VITALS
SYSTOLIC BLOOD PRESSURE: 126 MMHG | HEART RATE: 95 BPM | RESPIRATION RATE: 16 BRPM | BODY MASS INDEX: 47.46 KG/M2 | OXYGEN SATURATION: 96 % | TEMPERATURE: 97.7 F | DIASTOLIC BLOOD PRESSURE: 66 MMHG | WEIGHT: 243 LBS

## 2019-10-25 DIAGNOSIS — R32 URINARY INCONTINENCE, UNSPECIFIED TYPE: ICD-10-CM

## 2019-10-25 DIAGNOSIS — L03.317 CELLULITIS, GLUTEAL: ICD-10-CM

## 2019-10-25 DIAGNOSIS — J20.9 ACUTE BRONCHITIS, UNSPECIFIED ORGANISM: Primary | ICD-10-CM

## 2019-10-25 LAB
ALBUMIN UR-MCNC: NEGATIVE MG/DL
APPEARANCE UR: CLEAR
BACTERIA #/AREA URNS HPF: ABNORMAL /HPF
BILIRUB UR QL STRIP: NEGATIVE
COLOR UR AUTO: YELLOW
GLUCOSE UR STRIP-MCNC: NEGATIVE MG/DL
HGB UR QL STRIP: NEGATIVE
KETONES UR STRIP-MCNC: NEGATIVE MG/DL
LEUKOCYTE ESTERASE UR QL STRIP: ABNORMAL
NITRATE UR QL: NEGATIVE
NON-SQ EPI CELLS #/AREA URNS LPF: ABNORMAL /LPF
PH UR STRIP: 7 PH (ref 5–7)
RBC #/AREA URNS AUTO: ABNORMAL /HPF
SOURCE: ABNORMAL
SP GR UR STRIP: 1.02 (ref 1–1.03)
UROBILINOGEN UR STRIP-ACNC: 0.2 EU/DL (ref 0.2–1)
WBC #/AREA URNS AUTO: ABNORMAL /HPF

## 2019-10-25 PROCEDURE — 81001 URINALYSIS AUTO W/SCOPE: CPT | Performed by: NURSE PRACTITIONER

## 2019-10-25 PROCEDURE — 99214 OFFICE O/P EST MOD 30 MIN: CPT | Performed by: NURSE PRACTITIONER

## 2019-10-25 RX ORDER — BENZONATATE 100 MG/1
100 CAPSULE ORAL 3 TIMES DAILY PRN
Qty: 30 CAPSULE | Refills: 3 | Status: ON HOLD | OUTPATIENT
Start: 2019-10-25 | End: 2019-12-21

## 2019-10-25 RX ORDER — GUAIFENESIN AND DEXTROMETHORPHAN HYDROBROMIDE 600; 30 MG/1; MG/1
1 TABLET, EXTENDED RELEASE ORAL EVERY 12 HOURS
Qty: 30 TABLET | Refills: 0 | Status: ON HOLD | OUTPATIENT
Start: 2019-10-25 | End: 2019-12-21

## 2019-10-25 RX ORDER — AZITHROMYCIN 250 MG/1
TABLET, FILM COATED ORAL
Qty: 6 TABLET | Refills: 0 | Status: SHIPPED | OUTPATIENT
Start: 2019-10-25 | End: 2019-11-01

## 2019-10-25 RX ORDER — ZINC OXIDE
OINTMENT (GRAM) TOPICAL PRN
Qty: 56 G | Refills: 0 | Status: ON HOLD | OUTPATIENT
Start: 2019-10-25 | End: 2019-12-21

## 2019-10-25 NOTE — PROGRESS NOTES
Subjective     Nena Tang is a 59 year old female  With a hx of schizophrenia, type II DM, CAD, hypertension who presents to clinic today for the following health issues. She is accompanied by a worker from her group home.   HPI   Acute Illness   Acute illness concerns: cough and congestion  Onset: one week    Fever: no     Chills/Sweats: no     Headache (location?): YES frontal    Sinus Pressure:no    Conjunctivitis:  YES: both    Ear Pain: no    Rhinorrhea: YES    Congestion: no     Sore Throat: no      Cough: YES-productive    Wheeze: YES    Decreased Appetite: YES    Nausea: YES    Vomiting: no     Diarrhea:  no     Dysuria/Freq.: no , incontinence    Fatigue/Achiness: YES    Sick/Strep Exposure: no      Therapies Tried and outcome: cough syrup    Patient states productive cough with yellow sputum started about 6 days ago. She reports associated pharyngitis. Denies any wheezing or difficulty breathing. Has tried some cough syrup. Denies fevers or chills.       Staff states patient has developed some excoriation between her gluteal folds. States the patient does not always wipe well with BMs and also has chronic urinary incontinence that has been worse lately. Denies any purulent drainage. They have tried to use gauze but it often falls out. The group home staff assists the patient with toileting, but when she is at her day program she does not have that assistance.     The patient denies any dysuria or concern for UTI.       Patient Active Problem List   Diagnosis     Osteopenia     Vitamin B12 deficiency without anemia     Hyperlipidemia LDL goal <100     Rotator cuff syndrome     Type 2 diabetes mellitus with mild nonproliferative retinopathy (H)     Illiterate     Irritable bowel syndrome     overweight - BMI >35     Takotsubo cardiomyopathy     CAD (coronary artery disease)     Restless legs syndrome (RLS)     CINDY (obstructive sleep apnea)- mild AHI 10.3     Verbal auditory hallucination     Chronic  low back pain     Schizoaffective disorder, depressive type (H)     Migraine headache     HTN, goal below 140/90     Health Care Home     Lumbago     Cervicalgia     Cocaine abuse, episodic (H)     Suicidal ideation     Esophageal reflux     Mild nonproliferative diabetic retinopathy (H)     Tardive dyskinesia     Alcohol use     Left cataract     Falls frequently     History of uterine cancer     Psychophysiological insomnia     Dysuria     Asymptomatic postmenopausal status     Abdominal pain, right lower quadrant     Infectious encephalopathy     Non-intractable vomiting with nausea     Thoughts of self harm     Gastroenteritis     Posttraumatic stress disorder     Cervical cancer screening     Type 2 diabetes mellitus with stage 3 chronic kidney disease, with long-term current use of insulin (H)     Positive AIDA (antinuclear antibody)     Hyperglycemia     Pneumonia     Past Surgical History:   Procedure Laterality Date     C OOPHORECTORMY FOR MALIG, W/BX  1983    UTERINE     CATARACT IOL, RT/LT Bilateral 2017     COLONOSCOPY N/A 3/16/2017    Procedure: COLONOSCOPY;  Surgeon: Traci Gonzalez MD;  Location: [facility]     Coronary CTA  5/21/2014     HYSTERECTOMY  1983    uterine cancer yearly pap's per provider.     LAPAROSCOPIC CHOLECYSTECTOMY  2008     PHACOEMULSIFICATION CLEAR CORNEA WITH STANDARD INTRAOCULAR LENS IMPLANT Left 5/5/2017    Procedure: PHACOEMULSIFICATION CLEAR CORNEA WITH STANDARD INTRAOCULAR LENS IMPLANT;  LEFT EYE PHACOEMULSIFICATION CLEAR CORNEA WITH STANDARD INTRAOCULAR LENS IMPLANT ;  Surgeon: Tyra Diaz MD;  Location:  [facility]     PHACOEMULSIFICATION CLEAR CORNEA WITH STANDARD INTRAOCULAR LENS IMPLANT Right 6/30/2017    Procedure: PHACOEMULSIFICATION CLEAR CORNEA WITH STANDARD INTRAOCULAR LENS IMPLANT;  RIGHT EYE PHACOEMULSIFICATION CLEAR CORNEA WITH STANDARD INTRAOCULAR LENS IMPLANT;  Surgeon: Tyra Diaz MD;  Location:  [facility]     RELEASE TRIGGER FINGER  10/11/2012     Left thumb. Procedure: RELEASE TRIGGER FINGER;  LEFT THUMB TRIGGER RELEASE;  Surgeon: Tay Langley MD;  Location:  [facility]     RELEASE TRIGGER FINGER Right 2016    Procedure: RELEASE TRIGGER FINGER;  Surgeon: Albino Castañeda MD;  Location:  [facility]       Social History     Tobacco Use     Smoking status: Never Smoker     Smokeless tobacco: Never Used   Substance Use Topics     Alcohol use: No     Frequency: Never     Comment: last month     Family History   Problem Relation Age of Onset     Cancer Mother         BLADDER     Respiratory Mother         COPD     Gastrointestinal Disease Mother         CIRRHOSIS OF LI BOLIVAR     Alcohol/Drug Mother      Diabetes Mother      Hypertension Mother      Lipids Mother      C.A.D. Mother      Glaucoma Mother      Alcohol/Drug Sister      Mental Illness Sister      Alcohol/Drug Sister      Psychotic Disorder Sister      Cancer Maternal Grandmother         UNKNOWN TYPE     Cancer Brother         COLON     Cancer - colorectal Brother         IN HIS LATE 30S     Alcohol/Drug Brother          OF HEROIN OVERDOSE AT AGE 22 YRS     Macular Degeneration No family hx of          Current Outpatient Medications   Medication Sig Dispense Refill     aspirin (ASA) 81 MG EC tablet TAKE 1 TABLET (81MG) BY MOUTH DAILY 90 tablet 0     atorvastatin (LIPITOR) 80 MG tablet TAKE 1 TABLET (80MG) BY MOUTH DAILY 30 tablet 11     azithromycin (ZITHROMAX) 250 MG tablet Take 2 tablets (500 mg) by mouth daily for 1 day, THEN 1 tablet (250 mg) daily for 4 days. 6 tablet 0     benzonatate (TESSALON) 100 MG capsule Take 1 capsule (100 mg) by mouth 3 times daily as needed for cough 30 capsule 3     benztropine (COGENTIN) 0.5 MG tablet Take 2 tablets (1 mg) by mouth 2 times daily 120 tablet 3     blood glucose (NO BRAND SPECIFIED) test strip Use to test blood sugar  3 times daily or as directed. To accompany: Blood Glucose Monitor Brands: per insurance. 100 strip 0     calcium carbonate (OS-ALLEN) 1500  (600 Ca) MG tablet Take 3 tablets (1,800 mg) by mouth daily 180 tablet 3     cholecalciferol (VITAMIN D3) 33787 units (1250 mcg) capsule TAKE 1 CAPSULE (50,000 UNITS) MONTHLY 4 capsule 3     clonazePAM (KLONOPIN) 0.5 MG tablet Take 1 tablet (0.5 mg) by mouth At Bedtime 30 tablet 0     Continuous Blood Gluc Sensor (DEXCOM G5 MOB/G4 PLAT SENSOR) MISC 1 Device every 7 days       Continuous Blood Gluc Transmit (DEXCOM G5 MOBILE TRANSMITTER) MISC 1 Device every 3 months       dextromethorphan-guaiFENesin (MUCINEX DM)  MG 12 hr tablet Take 1 tablet by mouth every 12 hours 30 tablet 0     diphenhydrAMINE (BENADRYL) 25 MG tablet Take 1 tablet in am and 1 tablet at 2 pm. Until Rash resolves 90 tablet 0     gabapentin (NEURONTIN) 300 MG capsule Take 3 capsules (900 mg) by mouth 3 times daily 270 capsule 1     guaiFENesin (ROBITUSSIN) 100 MG/5ML liquid Take 10 mLs (200 mg) by mouth nightly as needed for cough 118 mL 0     ibuprofen (ADVIL/MOTRIN) 200 MG tablet Take 200 mg by mouth every 8 hours as needed for mild pain       insulin aspart (NOVOLOG FLEXPEN) 100 UNIT/ML pen Inject 20 Units Subcutaneous 3 times daily (with meals) Once daily, can add additional 5 units if BGs are >500mg/dL.  Hold Insulin if BG are<70. 15 mL 11     insulin glargine (LANTUS SOLOSTAR PEN) 100 UNIT/ML pen Inject 60 Units Subcutaneous At Bedtime 15 mL 11     insulin pen needle (NOVOFINE 30) 30G X 8 MM miscellaneous USE 4 DAILY OR AS DIRECTED 400 each 0     ipratropium - albuterol 0.5 mg/2.5 mg/3 mL (DUONEB) 0.5-2.5 (3) MG/3ML neb solution Take 1 vial (3 mLs) by nebulization every 6 hours as needed for shortness of breath / dyspnea or wheezing 30 vial 0     lidocaine (LIDODERM) 5 % patch Place 1 patch onto the skin every 24 hours 10 patch 0     losartan (COZAAR) 50 MG tablet Take 1 tablet (50 mg) by mouth daily 90 tablet 3     melatonin 5 MG CAPS Take 2 capsules by mouth At Bedtime 180 capsule 3     metoprolol succinate ER (TOPROL-XL) 25 MG 24 hr  tablet Take 1 tablet (25 mg) by mouth daily 90 tablet 1     nystatin (MYCOSTATIN) 308098 UNIT/GM external cream Apply topically 2 times daily 30 g 3     nystatin (MYCOSTATIN) 170482 UNIT/GM external powder Apply topically once as needed for dry skin 60 g 3     order for DME Equipment being ordered: CPAP Supplies. 1 each 0     order for DME Equipment being ordered: Depends. 30 each 1     order for DME Equipment being ordered: Freestyle [place] 1 Device 0     paliperidone ER (INVEGA) 9 MG 24 hr tablet Take 1 tablet (9 mg) by mouth every morning 90 tablet 1     polyethylene glycol (MIRALAX) powder Take 17 g (1 capful) by mouth daily as needed for constipation 500 g 3     ranitidine (ZANTAC) 300 MG tablet TAKE 1 TABLET (300MG) BY MOUTH AT BEDTIME 90 tablet 0     semaglutide (OZEMPIC) 1 MG/DOSE pen Inject 1 mg Subcutaneous every 7 days 6 mL 5     sertraline (ZOLOFT) 100 MG tablet Take 2 tablets (200 mg) by mouth daily 60 tablet 3     topiramate (TOPAMAX) 50 MG tablet Take 1.5 tablets (75 mg) by mouth 2 times daily 270 tablet 0     zinc oxide (DESITIN) 40 % external ointment Apply topically as needed for dry skin or irritation 56 g 0     Allergies   Allergen Reactions     Imidazole Antifungals Hives     Tolerates diflucan     Ketoprofen Itching     Pruritis to topical     Lisinopril Hives     Metformin Other (See Comments)     Patient hospitalized for lactic acidosis - admitting provider suspectd caused by metformin     Metronidazole Hives     Posaconazole Hives     Tolerates diflucan     Recent Labs   Lab Test 10/05/19  0552 09/23/19  1814 09/14/19  1915 08/06/19  1043  07/28/19  1141 05/29/19  2226 05/24/19  0916  05/15/19  1822  12/20/18  1532  08/06/18  1038  06/27/17  1358   A1C  --   --   --  6.6*  --   --   --   --   --  7.6*  --  6.4*  --  6.4*   < > 7.0*   LDL  --   --   --  81  --   --   --   --   --   --   --   --   --  84  --  64   HDL  --   --   --  39*  --   --   --   --   --   --   --   --   --  46*   --  37*   TRIG  --   --   --  131  --   --   --   --   --   --   --   --   --  215*  --  267*   ALT  --   --  27  --   --  23 32 30  --  33   < >  --    < >  --    < > 32   CR 0.98 0.98 0.90  --    < > 1.13* 0.89 0.87   < > 0.93   < > 0.99   < >  --    < > 0.78   GFRESTIMATED 63 63 70  --    < > 53* 71 73   < > 67   < > 63   < >  --    < > 76   GFRESTBLACK 73 73 81  --    < > 62 82 85   < > 78   < > 73   < >  --    < > >90   GFR Calc     POTASSIUM 4.4 4.1 4.7  --    < > 4.1 4.0 4.0   < > 4.3   < > 4.2   < >  --    < > 4.3   TSH  --   --   --   --   --   --   --  1.62  --   --   --  1.98  --   --    < >  --     < > = values in this interval not displayed.      BP Readings from Last 3 Encounters:   10/25/19 126/66   10/16/19 117/68   10/11/19 104/72    Wt Readings from Last 3 Encounters:   10/25/19 110.2 kg (243 lb)   10/16/19 109.8 kg (242 lb)   10/11/19 109.8 kg (242 lb)                    Reviewed and updated as needed this visit by Provider  Allergies         Review of Systems   ROS COMP: Constitutional, HEENT, cardiovascular, pulmonary, gi and gu systems are negative, except as otherwise noted.      Objective    /66   Pulse 95   Temp 97.7  F (36.5  C)   Resp 16   Wt 110.2 kg (243 lb)   LMP 01/06/2015   SpO2 96%   BMI 47.46 kg/m    Body mass index is 47.46 kg/m .  Physical Exam   GENERAL: healthy, alert and no distress  EYES: Eyes grossly normal to inspection, PERRL and conjunctivae and sclerae normal  HENT: normal cephalic/atraumatic, ear canals and TM's normal, nose and mouth without ulcers or lesions, nasal mucosa edematous , rhinorrhea clear, oropharynx clear and oral mucous membranes moist  NECK: no adenopathy, no asymmetry, masses, or scars and thyroid normal to palpation  RESP: lungs clear to auscultation - no rales, rhonchi or wheezes  CV: regular rate and rhythm, normal S1 S2, no S3 or S4, no murmur, click or rub, no peripheral edema and peripheral pulses strong  MS: no gross  musculoskeletal defects noted, no edema  SKIN: no suspicious lesions or rashes  Skin breakdown to gluteal crease fold with some surrounding erythema    Diagnostic Test Results:  Labs reviewed in Epic  Results for orders placed or performed in visit on 10/25/19   UA with Microscopic reflex to Culture   Result Value Ref Range    Color Urine Yellow     Appearance Urine Clear     Glucose Urine Negative NEG^Negative mg/dL    Bilirubin Urine Negative NEG^Negative    Ketones Urine Negative NEG^Negative mg/dL    Specific Gravity Urine 1.020 1.003 - 1.035    pH Urine 7.0 5.0 - 7.0 pH    Protein Albumin Urine Negative NEG^Negative mg/dL    Urobilinogen Urine 0.2 0.2 - 1.0 EU/dL    Nitrite Urine Negative NEG^Negative    Blood Urine Negative NEG^Negative    Leukocyte Esterase Urine Trace (A) NEG^Negative    Source Midstream Urine     WBC Urine 0 - 5 OTO5^0 - 5 /HPF    RBC Urine O - 2 OTO2^O - 2 /HPF    Squamous Epithelial /LPF Urine Few FEW^Few /LPF    Bacteria Urine Few (A) NEG^Negative /HPF     *Note: Due to a large number of results and/or encounters for the requested time period, some results have not been displayed. A complete set of results can be found in Results Review.           Assessment & Plan     (J20.9) Acute bronchitis, unspecified organism  (primary encounter diagnosis)  Comment: she is afebrile and oxygen saturations are 96%, no concern for acute respiratory distress. Will treat for bacterial bronchitis with Zithromax. Tessalon pearls and Mucinex DM PRN. Increase fluids and rest. If not better within 2-3 days, worsens, fevers or difficulty breathing go to ER.   Plan: azithromycin (ZITHROMAX) 250 MG tablet,         benzonatate (TESSALON) 100 MG capsule,         dextromethorphan-guaiFENesin (MUCINEX DM)          MG 12 hr tablet            (L03.317) Cellulitis, gluteal  Comment: some redness, will order barrier cream. Staff to assist in personal care.   Plan: zinc oxide (DESITIN) 40 % external ointment             (R32) Urinary incontinence, unspecified type  Comment: seems to be a chronic issue but will get a UA to be sure. Incontinence liners ordered per staff request.   Plan: UA with Microscopic reflex to Culture,         INCONTINENCE LINERS, EACH                       See Patient Instructions  Patient Instructions   Start Zithromax for five days.  Tessalon Pearls 3x daily as needed for cough.  Mucinex DM 2x daily as needed.  Drink lots of water.  We will do a urine test today.  Incontinence liners ordered.  Apply barrier cream to affected area in gluteal folds daily.   If cough not better in 2 days, fever develops or worsens please return, go to ER or UC        No follow-ups on file.    ART [facility] PR[facility]

## 2019-10-25 NOTE — PATIENT INSTRUCTIONS
Start Zithromax for five days.  Tessalon Pearls 3x daily as needed for cough.  Mucinex DM 2x daily as needed.  Drink lots of water.  We will do a urine test today.  Incontinence liners ordered.  Apply barrier cream to affected area in gluteal folds daily.   If cough not better in 2 days, fever develops or worsens please return, go to ER or UC

## 2019-10-28 LAB — SLPCOMP: NORMAL

## 2019-10-28 NOTE — PROCEDURES
SLEEP STUDY INTERPRETATION  SPLIT NIGHT STUDY      Patient: ERIK BURNHAM  YOB: 1960  Study Date: 10/21/2019  MRN: [id number]  Referring Provider: None  Ordering Provider: MD Reggie, Pippa Simental    Indications for Polysomnography: The patient is a 59 y old Female who is 5' and weighs 242.0 lbs. Her BMI is 47.5, [place] sleepiness scale 13.0 and neck circumference is 44.0 cm. Relevant medical history includes CINDY, diabetes mellitus type 2, dyslipidemia, major depressive disorder, vitamin D deficiency, history of Takotsubo cardiomyopathy, migraine headaches, coronary artery disease with stent placement, and hypertension. A diagnostic polysomnogram was performed to re-evaluate for previously diagnosed sleep related breathing disorder. After 140.0 minutes of sleep time the patient exhibited sufficient respiratory events qualifying her for a CPAP trial which was then initiated.    Polysomnogram Data: A full night polysomnogram recorded the standard physiologic parameters including EEG, EOG, EMG, ECG, nasal and oral airflow. Respiratory parameters of chest and abdominal movements were recorded with respiratory inductance plethysmography. Oxygen saturation was recorded by pulse oximetry.  Hypopnea scoring rule used: 1B 4%    Diagnostic PSG  Sleep Architecture: sleep fragmentation and absent REM sleep.     The total recording time of the polysomnogram was 157.4 minutes. The total sleep time was 140.0 minutes. Sleep latency was normal at 11.9 minutes with the use of a sleep aid (melatonin (10 mg) and clonazepam (0.5 mg) and  she took her gabapentin (300 mg) before arriving at the sleep lab).  REM sleep was not seen. Arousal index was increased at 42.9 arousals per hour. Sleep efficiency was normal at 88.9%. Wake after sleep onset was 5.5 minutes. The patient spent 13.9% of total sleep time in Stage N1, 77.9% in Stage N2, 8.2% in Stage N3, and 0.0% in REM.     Respiration: Severe  obstructive sleep apnea with sleep associated hypoxemia.  Patient slept in supine position during the entire diagnostic part of the study.  There was no evidence of hypoventilation.  Since REM sleep was not observed during the baseline, it is plausible that the overall severity of the sleep-related breathing disorder may have been underestimated.    Events ? The polysomnogram revealed a presence of 12 obstructive, - central, and - mixed apneas resulting in an apnea index of 5.1 events per hour. There were 92 obstructive hypopneas and - central hypopneas resulting in an obstructive hypopnea index of 39.4 and central hypopnea index of - events per hour. The combined apnea/hypopnea index was 44.6 events per hour (central apnea/hypopnea index was - events per hour).  The REM AHI was - events per hour. The supine AHI was 44.6 events per hour. The RERA index was 0.4 events per hour. The RDI was 45.0 events per hour.    Snoring - was reported as mild to moderate.    Respiratory rate and pattern - was notable for normal respiratory rate and pattern.    Sustained Sleep Associated Hypoventilation - Transcutaneous carbon dioxide monitoring was used, however significant hypoventilation was not present with a maximum change from 43-48 mmHg.    Sleep Associated Hypoxemia - (Greater than 5 minutes O2 sat at or below 88%) was present. Baseline oxygen saturation was 91.2%. Lowest oxygen saturation was 67.2%. Time spent less than or equal to 88% was 18.6 minutes. Time spent less than or equal to 89% was 29.2 minutes.     Treatment PSG  Sleep Architecture: With PAP therapy, arousals significantly reduced with improvement in sleep efficiency. REM sleep was seen.  At 02:18:23 AM the patient was placed on PAP treatment and was titrated at pressures ranging from CPAP 14 cmH2O up to Bi-level 23/18/0 cmH2O. The total recording time of the treatment portion of the study was 304.4 minutes. The total sleep time was 285.0 minutes. During the  treatment portion of the study the sleep latency was 0.5 minutes. REM latency was 73.0 minutes. Arousal index was normal at 17.9 arousals per hour. Sleep efficiency was increased at 93.6%. Wake after sleep onset was 18.0 minutes. The patient spent 5.1% of total sleep time in Stage N1, 74.9% in Stage N2, 0.2% in Stage N3, and 19.8% in REM. Time in REM supine was 41.0 minutes.     Respiration: CPAP and BiPAP titrations were obtained during the study. With Bi-level PAP  at the final pressure setting of IPAP=23 and EPAP=17 cmH2O, REM sleep were observed in lateral sleep position with elimination of obstructive events, resolution of hypoxemia with oxygen saturation remaining above 90% and transcutaneous PCO2 level  at 44 mmHg.  Pt was started on CPAP at a pressure of 14 cm H2O and was gradually titrated to 18 cm H2O. Due to persistent obstructive events, snoring and flow limitation, the device was switched to Bi-level PAP.  Obstructive events and snoring was present even during Bi-level PAP treatment with IPAP =21 and EPAP =16 cmH2O.  With Bi-level PAP  at the final pressure setting of IPAP=23 and EPAP=17 cmH2O, REM sleep were observed in lateral sleep position with elimination of obstructive events, resolution of hypoxemia with oxygen saturation remaining above 90% and transcutaneous PCO2 level  at 44 mmHg.  The final pressure was BiLevel 23/17/0 cmH2O with an AHI of - events per hour. Time in REM supine on final pressure was - minutes.     This titration was considered Adequate (residual AHI with 75% decrease or above constraints without REM?supine sleep at final pressure).    Movement Activity: There were no significant limb movements.  Intermittent increase in muscle activity was noted during the final REM period, during phasic REM sleep in some epochs, but there were no abnormal sleep-related behaviors during the study.    Periodic Limb Movements  o During the diagnostic portion of the study, there were - PLMs  recorded. The PLM index was - movements per hour. The PLM Arousal Index was - per hour.  o During the treatment portion of the study, there were 16 PLMs recorded. The PLM index was 3.4 movements per hour. The PLM Arousal Index was - per hour.    REM EMG Activity - muscle activity during initial REM period during PPAP titration could not be assessed due to frequent respiratory events. But during the final REM period, intermittent increase in muscle activity was noted during phasic REM sleep in some epochs.    Nocturnal Behavior - Abnormal sleep related behaviors were not noted during/arising out of NREM / REM sleep.      Bruxism - None apparent.    Cardiac Summary: Normal sinus rhythm was noted.  During the diagnostic portion of the study, the average pulse rate was 73.6 bpm. The minimum pulse rate was 68.9 bpm while the maximum pulse rate was 82.3 bpm.    During the treatment portion of the study, the average pulse rate was 71.8 bpm. The minimum pulse rate was 66.9 bpm while the maximum pulse rate was 80.1 bpm.     Arrhythmias were not noted.      Assessment:     Severe obstructive sleep apnea with sleep associated hypoxemia.  Patient slept in supine position during the entire diagnostic part of the study.  There was no evidence of hypoventilation.  Since REM sleep was not observed during the baseline, it is plausible that the overall severity of the sleep-related breathing disorder may have been underestimated.    CPAP and BiPAP titrations were obtained during the study. With Bi-level PAP  at the final pressure setting of IPAP=23 and EPAP=17 cmH2O, REM sleep were observed in lateral sleep position with elimination of obstructive events, resolution of hypoxemia with oxygen saturation remaining above 90% and transcutaneous PCO2 level  at 44 mmHg.    Sleep architecture during the baseline was remarkable for sleep fragmentation and absent REM sleep.  With PAP therapy, arousals significantly reduced with improvement in  sleep efficiency. REM sleep was seen.    There were no significant limb movements.      Intermittent increase in muscle activity was noted during the final REM period, during phasic REM sleep in some epochs, but there were no abnormal sleep-related behaviors during the study.      Normal sinus rhythm was noted.    Recommendations:    Treatment of sleep related breathing disorder with Bi-level PAP at IPAP=23 and EPAP=[id number], while maximizing sleep on sides. Recommend clinical follow up with sleep management team, for coaching and review of effectiveness and compliance measures.    Advice regarding the risks of drowsy driving.    Suggest optimizing sleep schedule and avoiding sleep deprivation.    Weight management (if BMI > 30).    Pharmacologic therapy should be used for management of restless legs syndrome only if present and clinically indicated and not based on the presence of periodic limb movements alone.    Diagnostic Codes:   Obstructive Sleep Apnea G47.33  Sleep Hypoxemia/Hypoventilation G47.36   Repetitive Intrusions Into Sleep F51.8      10/21/2019 [facility] Sleep Study (242.0 lbs) - AHI 44.6, RDI 45.0, Supine AHI 44.6, REM AHI -, Low O2% 67.2%, Time Spent ?88% 18.6, Time Spent ?89% 29.2. Treatment was titrated to a pressure of BiLevel 23/17/0 with an AHI -. Time spent in REM supine at this pressure was - minutes.    _____________________________________   Electronically Signed By: (Natasha Paredes MD), 10/27/2019

## 2019-11-01 ENCOUNTER — OFFICE VISIT (OUTPATIENT)
Dept: FAMILY MEDICINE | Facility: CLINIC | Age: 59
End: 2019-11-01
Payer: MEDICARE

## 2019-11-01 VITALS
HEIGHT: 62 IN | HEART RATE: 92 BPM | TEMPERATURE: 97.1 F | RESPIRATION RATE: 18 BRPM | SYSTOLIC BLOOD PRESSURE: 104 MMHG | BODY MASS INDEX: 44.9 KG/M2 | OXYGEN SATURATION: 92 % | WEIGHT: 244 LBS | DIASTOLIC BLOOD PRESSURE: 72 MMHG

## 2019-11-01 DIAGNOSIS — N89.8 DISCHARGE OF VAGINA: ICD-10-CM

## 2019-11-01 DIAGNOSIS — Z00.00 ENCOUNTER FOR MEDICARE ANNUAL WELLNESS EXAM: Primary | ICD-10-CM

## 2019-11-01 DIAGNOSIS — N39.46 MIXED INCONTINENCE: ICD-10-CM

## 2019-11-01 DIAGNOSIS — Z00.00 ROUTINE GENERAL MEDICAL EXAMINATION AT A HEALTH CARE FACILITY: ICD-10-CM

## 2019-11-01 DIAGNOSIS — Z12.4 CERVICAL CANCER SCREENING: ICD-10-CM

## 2019-11-01 DIAGNOSIS — Z78.0 ASYMPTOMATIC MENOPAUSAL STATE: ICD-10-CM

## 2019-11-01 LAB
SPECIMEN SOURCE: NORMAL
WET PREP SPEC: NORMAL

## 2019-11-01 PROCEDURE — 87210 SMEAR WET MOUNT SALINE/INK: CPT | Performed by: NURSE PRACTITIONER

## 2019-11-01 PROCEDURE — 99396 PREV VISIT EST AGE 40-64: CPT | Performed by: NURSE PRACTITIONER

## 2019-11-01 PROCEDURE — G0145 SCR C/V CYTO,THINLAYER,RESCR: HCPCS | Performed by: NURSE PRACTITIONER

## 2019-11-01 PROCEDURE — G0476 HPV COMBO ASSAY CA SCREEN: HCPCS | Performed by: NURSE PRACTITIONER

## 2019-11-01 ASSESSMENT — MIFFLIN-ST. JEOR: SCORE: 1632.09

## 2019-11-01 NOTE — PROGRESS NOTES
SUBJECTIVE:   CC: Nena Tang is an 59 year old woman who presents for preventive health visit.     Healthy Habits:    Do you get at least three servings of calcium containing foods daily (dairy, green leafy vegetables, etc.)? yes    Amount of exercise or daily activities, outside of work: tries to get some in    Problems taking medications regularly No    Medication side effects: No    Have you had an eye exam in the past two years? Yes- about 5 months ago    Do you see a dentist twice per year? yes    Do you have sleep apnea, excessive snoring or daytime drowsiness?yes      Diabetes Follow-up  How often are you checking your blood sugar? Four or more times daily  Blood sugar testing frequency justification:  's with eating candy. Will be done with candy after the weekend.     BP Readings from Last 2 Encounters:   11/01/19 104/72   10/25/19 126/66     Hemoglobin A1C (%)   Date Value   08/06/2019 6.6 (H)   05/15/2019 7.6 (H)     LDL Cholesterol Calculated (mg/dL)   Date Value   08/06/2019 81   08/06/2018 84       Diabetes Management Resources      Today's PHQ-2 Score:   PHQ-2 ( 1999 [employer]) 11/1/2019 9/26/2019   Q1: Little interest or pleasure in doing things 1 1   Q2: Feeling down, depressed or hopeless 1 1   PHQ-2 Score 2 2       Abuse: Current or Past(Physical, Sexual or Emotional)- No  Do you feel safe in your environment? Yes      Social History     Tobacco Use     Smoking status: Never Smoker     Smokeless tobacco: Never Used   Substance Use Topics     Alcohol use: No     Frequency: Never     Comment: last month     If you drink alcohol do you typically have >3 drinks per day or >7 drinks per week? No                     Reviewed orders with patient.  Reviewed health maintenance and updated orders accordingly - Yes  Labs reviewed in EPIC    Mammogram Screening: Patient over age 50, mutual decision to screen reflected in health maintenance.    Pertinent mammograms are reviewed under the imaging  tab.  History of abnormal Pap smear: YES - updated in Problem List and Health Maintenance accordingly  PAP / HPV Latest Ref Rng & Units 5/22/2018 12/7/2016   PAP - NIL NIL   HPV 16 DNA NEG:Negative Negative Negative   HPV 18 DNA NEG:Negative Negative Negative   OTHER HR HPV NEG:Negative Negative Negative       Reviewed and updated as needed this visit by Provider       Sleep apnea: Patient reports that she stopped using her CPAP because its blowing too much air. Patient reports that she re-did her sleep study and was waiting to hear back from them.       Past Medical History:   Diagnosis Date     Acute respiratory failure with hypoxia (H) 9/4/2017     CAD (coronary artery disease)     5/2014 cath, nonbostructive stenosis to LAD, RCA.     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/2011     Noncompliance with medication regimen 6/8/2011     Obesity      CINDY (obstructive sleep apnea) 3/8/2012    uses CPAP     Osteopenia 10/7/2009     Pneumonia of right lower lobe due to infectious organism (H) 9/4/2017     Schizoaffective disorder, depressive type (H) 2/25/2013     Sepsis (H) 8/29/2017     Suicidal intent 10/2/2013     Takotsubo cardiomyopathy      Type 2 diabetes mellitus (H) 8/30/2011     Uterine cancer (H) 1983     Verbal auditory hallucination 10/4/2012      Past Surgical History:   Procedure Laterality Date     C OOPHORECTORMY FOR MALIG, W/BX  1983    UTERINE     CATARACT IOL, RT/LT Bilateral 2017     COLONOSCOPY N/A 3/16/2017    Procedure: COLONOSCOPY;  Surgeon: Traci Gonzalez MD;  Location: [facility]     Coronary CTA  5/21/2014     HYSTERECTOMY  1983    uterine cancer yearly pap's per  "provider.     LAPAROSCOPIC CHOLECYSTECTOMY  2008     PHACOEMULSIFICATION CLEAR CORNEA WITH STANDARD INTRAOCULAR LENS IMPLANT Left 5/5/2017    Procedure: PHACOEMULSIFICATION CLEAR CORNEA WITH STANDARD INTRAOCULAR LENS IMPLANT;  LEFT EYE PHACOEMULSIFICATION CLEAR CORNEA WITH STANDARD INTRAOCULAR LENS IMPLANT ;  Surgeon: Tyra Diaz MD;  Location:  [facility]     PHACOEMULSIFICATION CLEAR CORNEA WITH STANDARD INTRAOCULAR LENS IMPLANT Right 6/30/2017    Procedure: PHACOEMULSIFICATION CLEAR CORNEA WITH STANDARD INTRAOCULAR LENS IMPLANT;  RIGHT EYE PHACOEMULSIFICATION CLEAR CORNEA WITH STANDARD INTRAOCULAR LENS IMPLANT;  Surgeon: Tyra Diaz MD;  Location:  [facility]     RELEASE TRIGGER FINGER  10/11/2012    Left thumb. Procedure: RELEASE TRIGGER FINGER;  LEFT THUMB TRIGGER RELEASE;  Surgeon: Tay Langley MD;  Location:  [facility]     RELEASE TRIGGER FINGER Right 12/26/2016    Procedure: RELEASE TRIGGER FINGER;  Surgeon: Albino Castañeda MD;  Location: [facility]       ROS:  CONSTITUTIONAL: NEGATIVE for fever, chills, change in weight  INTEGUMENTARY/SKIN: NEGATIVE for worrisome rashes, moles or lesions  EYES: NEGATIVE for vision changes or irritation  ENT: NEGATIVE for ear, mouth and throat problems  RESP: NEGATIVE for significant cough or SOB  BREAST: NEGATIVE for masses, tenderness or discharge  CV: NEGATIVE for chest pain, palpitations or peripheral edema  GI: NEGATIVE for nausea, abdominal pain, heartburn, or change in bowel habits  : NEGATIVE for unusual urinary or vaginal symptoms. No vaginal bleeding.  MUSCULOSKELETAL:arthralgias of shoulders.   NEURO: NEGATIVE for weakness, dizziness or paresthesias  PSYCHIATRIC: NEGATIVE for changes in mood or affect     OBJECTIVE:   /72   Pulse 92   Temp 97.1  F (36.2  C)   Resp 18   Ht 1.562 m (5' 1.5\")   Wt 110.7 kg (244 lb)   LMP 01/06/2015   SpO2 92%   BMI 45.36 kg/m       EXAM:  GENERAL: healthy, alert and no distress  EYES: Eyes grossly normal to " inspection, PERRL and conjunctivae and sclerae normal  NECK: no adenopathy and no asymmetry, masses, or scars  RESP: lungs clear to auscultation - no rales, rhonchi or wheezes  CV: regular rates and rhythm, normal S1 S2, no S3 or S4, no murmur, click or rub and no peripheral edema  ABDOMEN: soft, nontender, no hepatosplenomegaly, no masses and bowel sounds normal   (female): normal female external genitalia, normal urethral meatus, vaginal mucosa, normal cervix/adnexa/uterus without masses; increased discharge. Pap done.   MS: no gross musculoskeletal defects noted, no edema  NEURO: Normal strength and tone, mentation intact and speech normal    Diagnostic Test Results:  Labs reviewed in Epic  Results for orders placed or performed in visit on 11/01/19   Wet prep     Status: None   Result Value Ref Range    Specimen Description Vagina     Wet Prep No Trichomonas seen     Wet Prep No clue cells seen     Wet Prep No yeast seen     Wet Prep No WBC's seen        ASSESSMENT/PLAN:       ICD-10-CM    1. Encounter for Medicare annual wellness exam Z00.00 DEXA HIP/PELVIS/SPINE - Future   2. Routine general medical examination at a health care facility Z00.00 DEXA HIP/PELVIS/SPINE - Future   3. Mixed incontinence N39.46 order for DME   4. Asymptomatic menopausal state  Z78.0 DEXA HIP/PELVIS/SPINE - Future   5. Discharge of vagina N89.8 Wet prep   6. Cervical cancer screening Z12.4 Pap imaged thin layer screen with HPV - recommended age 30 - 65 years (select HPV order below)       COUNSELING:   Reviewed preventive health counseling, as reflected in patient instructions       Regular exercise       Healthy diet/nutrition       Vision screening       Immunizations: current            Osteoporosis Prevention/Bone Health    Estimated body mass index is 47.46 kg/m  as calculated from the following:    Height as of 10/16/19: 1.524 m (5').    Weight as of 10/25/19: 110.2 kg (243 lb).    Weight management plan: Discussed healthy  diet and exercise guidelines Already patient at the weight management clinic.      reports that she has never smoked. She has never used smokeless tobacco.      Counseling Resources:  ATP IV Guidelines  Pooled Cohorts Equation Calculator  Breast Cancer Risk Calculator  FRAX Risk Assessment  ICSI Preventive Guidelines  Dietary Guidelines for Americans, 2010  USDA's MyPlate  ASA Prophylaxis  Lung CA Screening    ART Fay CNP  [facility]

## 2019-11-01 NOTE — PATIENT INSTRUCTIONS
-Will call you with pap results.  -Please use your CPAP every night, call number to ask about the machine issues.   -Reordered Depends, bring order to the medical supply store.   -Will call you to schedule an appointment for bone scan to check for osteoporosis.       Patient Education   Personalized Prevention Plan  You are due for the preventive services outlined below.  Your care team is available to assist you in scheduling these services.  If you have already completed any of these items, please share that information with your care team to update in your medical record.  Preventive Health Recommendations  Female Ages 50 - 64    Yearly exam: See your health care provider every year in order to  o Review health changes.   o Discuss preventive care.    o Review your medicines if your doctor has prescribed any.      Get a Pap test every three years (unless you have an abnormal result and your provider advises testing more often).    If you get Pap tests with HPV test, you only need to test every 5 years, unless you have an abnormal result.     You do not need a Pap test if your uterus was removed (hysterectomy) and you have not had cancer.    You should be tested each year for STDs (sexually transmitted diseases) if you're at risk.     Have a mammogram every 1 to 2 years.    Have a colonoscopy at age 50, or have a yearly FIT test (stool test). These exams screen for colon cancer.      Have a cholesterol test every 5 years, or more often if advised.    Have a diabetes test (fasting glucose) every three years. If you are at risk for diabetes, you should have this test more often.     If you are at risk for osteoporosis (brittle bone disease), think about having a bone density scan (DEXA).    Shots: Get a flu shot each year. Get a tetanus shot every 10 years.    Nutrition:     Eat at least 5 servings of fruits and vegetables each day.    Eat whole-grain bread, whole-wheat pasta and brown rice instead of white grains  and rice.    Get adequate Calcium and Vitamin D.     Lifestyle    Exercise at least 150 minutes a week (30 minutes a day, 5 days a week). This will help you control your weight and prevent disease.    Limit alcohol to one drink per day.    No smoking.     Wear sunscreen to prevent skin cancer.     See your dentist every six months for an exam and cleaning.    See your eye doctor every 1 to 2 years.

## 2019-11-01 NOTE — LETTER
[facility]  [address] [facility] [address]  [address]  [place] [place] [address]  [phone]          November 20, 2019    Nena Tang                                                                                                                     [address] [address]            Dear ,      I am happy to inform you that your vaginal cancer screening test (PAP smear) was normal and your Human Papillomavirus (HPV) test was negative.    Per current guidelines, you no longer need to have pap smears completed. Please return for annual pelvic exams.    Please continue to be seen every year for an annual wellness visit and other preventative tests.     If you have additional questions regarding this result, please call our registered nurse, Antionette at [phone].    Sincerely,      ART Fay CNP./  Antionette Lucia RN-Pap Tracking

## 2019-11-03 ENCOUNTER — HEALTH MAINTENANCE LETTER (OUTPATIENT)
Age: 59
End: 2019-11-03

## 2019-11-04 ENCOUNTER — OFFICE VISIT (OUTPATIENT)
Dept: ENDOCRINOLOGY | Facility: CLINIC | Age: 59
End: 2019-11-04
Payer: MEDICARE

## 2019-11-04 VITALS
TEMPERATURE: 100.6 F | SYSTOLIC BLOOD PRESSURE: 127 MMHG | DIASTOLIC BLOOD PRESSURE: 71 MMHG | BODY MASS INDEX: 44.22 KG/M2 | HEIGHT: 62 IN | OXYGEN SATURATION: 94 % | WEIGHT: 240.3 LBS | HEART RATE: 86 BPM

## 2019-11-04 DIAGNOSIS — E66.01 MORBID OBESITY (H): Primary | ICD-10-CM

## 2019-11-04 DIAGNOSIS — E11.65 TYPE 2 DIABETES MELLITUS WITH HYPERGLYCEMIA, WITH LONG-TERM CURRENT USE OF INSULIN (H): ICD-10-CM

## 2019-11-04 DIAGNOSIS — Z79.4 TYPE 2 DIABETES MELLITUS WITH HYPERGLYCEMIA, WITH LONG-TERM CURRENT USE OF INSULIN (H): ICD-10-CM

## 2019-11-04 DIAGNOSIS — F25.0 SCHIZOAFFECTIVE DISORDER, BIPOLAR TYPE (H): ICD-10-CM

## 2019-11-04 RX ORDER — NALTREXONE HYDROCHLORIDE 50 MG/1
TABLET, FILM COATED ORAL
Qty: 30 TABLET | Refills: 3 | Status: ON HOLD | OUTPATIENT
Start: 2019-11-04 | End: 2019-12-21

## 2019-11-04 ASSESSMENT — MIFFLIN-ST. JEOR: SCORE: 1615.3

## 2019-11-04 ASSESSMENT — PAIN SCALES - GENERAL: PAINLEVEL: SEVERE PAIN (7)

## 2019-11-04 NOTE — PATIENT INSTRUCTIONS
"Continue Ozempic  Continue topiramate  Start naltrexone in the early afternoon to help with food cravings.  Start with 1/2 tab and increase to full tab if tolerating after 2-3 days      See Lauren Bloch in 1 month to follow up meds  See Debbie in 3 months return MWM        MEDICATION STARTED AT THIS APPOINTMENT  We are starting Naltrexone. Start with 1/2 tab 1-2 hours prior to the time you have the most trouble with cravings or extra hunger. If you are doing well you may switch to a whole tablet taken at the same time period.     WARNING: This medication blocks the action of opioid type pain medications. If you routinely take any medication like Codeine, Oxycontin,Percocet,Morphine,Dilaudid or Methodone, do not take this until you have talked with weight management staff. If you are planning surgery you should stop Naltrexone 4 days prior to the surgery. If you have an injury that requires pain medication, make sure the health care staff knows you take Naltrexone.     Call the nurse at [phone] if you have any questions or concerns. (Do not stop taking it if you don't think it's working. For some people it works without them knowing it.)    Naltrexone is a medication that is used most often to help people who are troubled by dependence on prescription pain killers or alcohol. It has also been found to help with weight loss. Although it's not currently FDA approved for weight loss, it has been used safely for a number of years to help people who are carrying extra weight.     Just how Naltrexone helps with weight loss has not been exactly determined.  It seems to work by quieting down brain signals related to strong food cravings. Many of our patients use the word \"addiction\" to describe their feelings and constant thoughts about food. It makes sense then to treat the feeling of dependence on food, outside of real hunger, with a medication designed to help with other sorts of dependence.     Our patients on " Naltrexone find that they:    >feel less interest in food   >think less about food and eating and have more time to think of other things   >find it easier to push the plate away   >have an easier time eating less    For some of our patients, these feelings are very immediate. Other patients, don't feel much of a change but find they've lost weight. Like all weight loss medications, Naltrexone works best when you help it work. This means:  1. Having less tempting high calorie (fattening) food around the house or office. (For people with strong cravings this is very important.)   2. Staying away from situations or people that may trigger your cravings .   3. Eating out only one time or less each week.  4. Eating your meals at a table with the TV or computer off.    Side-effects. Naltrexone is generally well tolerated. The main side-effect we see is  nausea or a woozy feeling. A small number of people feel quite ill. Most people have a mild reaction and some people have no reaction at all.  The good news is that this feeling does go away.     In order to avoid nausea, please start the medication with half a pill for the first few days. Go on to a full pill if you are feeling well.      If you  are nauseated on 1/2 a pill it is okay to cut back to 1/4 pill ( a very small amount). Take this for a couple of days and work your way back up to a 1/2 pill and then a whole pill. Taking the medication at night or with food  to start also may help prevent the feeling of nausea.         Please refer to the pharmacy insert for more information on side-effects. Since many pharmacists are not familiar with the use of naltrexone in weight loss, calling the nurse at [phone] will get you the most accurate information.  In order to get refills of this or any medication we prescribe you must be seen in the medical weight mgmt clinic every 2-3 months. Please have your pharmacy fax a refill request to [phone].

## 2019-11-04 NOTE — PROGRESS NOTES
"Return Medical Weight Management Note     Nena Tang  MRN:  [id number]  :  1960  GINNY:  2019    Dear Rowena Haas,    I had the pleasure of seeing your patient Nena Tang.  She is a 59 year old female who I am continuing to see for treatment of obesity related to:       2018   I have the following co-morbidities associated with obesity: Type II Diabetes, Pre-Diabetes, Heart Disease, High Blood Pressure, High Cholesterol     Last saw Lauren Bloch [facility] 5/15/19. She transitioned from Trulicity to Ozempic. Last visit increased ozempic to 0.5mg weekly and increased basaglar to 54 units daily. Saw MT on  and increased ozempic to 1mg daily  She lives in an assisted living facility and they help her with taking ozempic. Some mild constipation relieved with miralax but otherwise tolerating it well.      ED visit 5/15/19 for hyperglycemia and basaglar increased to 60 units  Novolog 25 units with meals  Blood sugars recently have been improved.  Fasting in the am ~150's  Blood sugars in the low 100's prior to meals     She feels ozempic is helping her to decrease her hunger and helping her to eat smaller portions.  She was 244 lbs in  and is 236 lbs today.      A1C May 7.6 and 6.6 19    INTERVAL HISTORY:  [facility] follow up.  Last visit was 19 and she has gained 4 lbs since last visit.  Taking 60 units basaglar and 20 novolog with meals  Ozempic helping blood sugars and A1C down from 7.6 to 6.6  Topamax on her medication list but she is unsure if she has still been taking it.     CURRENT WEIGHT:   240 lbs 4.8 oz    Wt Readings from Last 4 Encounters:   19 109 kg (240 lb 4.8 oz)   19 110.7 kg (244 lb)   10/25/19 110.2 kg (243 lb)   10/16/19 109.8 kg (242 lb)       Height:  5' 1.5\"  Body Mass Index:  Body mass index is 44.67 kg/m .  Vitals: /71 (BP Location: Left arm, Patient Position: Sitting, Cuff Size: Adult Regular)   Pulse 86   Temp 100.6  F (38.1  C) (Oral)   " " 1.562 m (5' 1.5\")   Wt 109 kg (240 lb 4.8 oz)   LMP 01/06/2015   SpO2 94%   BMI 44.67 kg/m        Initial consult weight was 237 on 6/7/18.  Weight change since last seen on 7/22/2019 is down 4 pounds.   Total gain is 3 pounds.    Diet and Activity Changes Since Last Visit Reviewed With Patient 11/4/2019   I have made the following changes to my diet since my last visit: none   With regards to my diet, I am still struggling with: eating to much   I have made the following changes to my activity/exercise since my last visit: none   With regards to my activity/exercise, I am still struggling with: food       MEDICATIONS:   Current Outpatient Medications   Medication     aspirin (ASA) 81 MG [facility] tablet     atorvastatin (LIPITOR) 80 MG tablet     benzonatate (TESSALON) 100 MG capsule     benztropine (COGENTIN) 0.5 MG tablet     blood glucose (NO BRAND SPECIFIED) test strip     calcium carbonate (OS-ALLEN) 1500 (600 Ca) MG tablet     cholecalciferol (VITAMIN D3) 28840 units (1250 mcg) capsule     clonazePAM (KLONOPIN) 0.5 MG tablet     Continuous Blood Gluc Sensor (DEXCOM G5 MOB/G4 PLAT SENSOR) MISC     Continuous Blood Gluc Transmit (DEXCOM G5 MOBILE TRANSMITTER) MISC     dextromethorphan-guaiFENesin (MUCINEX DM)  MG 12 hr tablet     diphenhydrAMINE (BENADRYL) 25 MG tablet     gabapentin (NEURONTIN) 300 MG capsule     guaiFENesin (ROBITUSSIN) 100 MG/5ML liquid     ibuprofen (ADVIL/MOTRIN) 200 MG tablet     insulin aspart (NOVOLOG FLEXPEN) 100 UNIT/ML pen     insulin glargine (LANTUS SOLOSTAR PEN) 100 UNIT/ML pen     insulin pen needle (NOVOFINE 30) 30G X 8 MM miscellaneous     ipratropium - albuterol 0.5 mg/2.5 mg/3 mL (DUONEB) 0.5-2.5 (3) MG/3ML neb solution     lidocaine (LIDODERM) 5 % patch     losartan (COZAAR) 50 MG tablet     melatonin 5 MG CAPS     metoprolol succinate ER (TOPROL-XL) 25 MG 24 hr tablet     nystatin (MYCOSTATIN) 442976 UNIT/GM external cream     nystatin (MYCOSTATIN) 981725 UNIT/GM " external powder     order for DME     order for DME     order for DME     paliperidone ER (INVEGA) 9 MG 24 hr tablet     polyethylene glycol (MIRALAX) powder     ranitidine (ZANTAC) 300 MG tablet     semaglutide (OZEMPIC) 1 MG/DOSE pen     sertraline (ZOLOFT) 100 MG tablet     topiramate (TOPAMAX) 50 MG tablet     zinc oxide (DESITIN) 40 % external ointment     No current facility-administered medications for this visit.        Weight Loss Medication History Reviewed With Patient 11/4/2019   Which weight loss medications are you currently taking on a regular basis?  Topamax (topiramate)   Are you having any side effects from the weight loss medication that we have prescribed you? No       ASSESSMENT:     [facility]WM follow up.  She is doing well on Ozempic which is helping her blood sugars and appetite but weight is not decreasing.  She has a lot of food cravings in the afternoon.    PLAN:  Continue Ozempic  Continue topiramate  Start naltrexone in the afternoons    FOLLOW-UP:    12 weeks.    Time: 15 min spent on evaluation, management, counseling, education, & motivational interviewing with greater than 50 % of the total time was spent on counseling and coordinating care    Sincerely,    Debbie Germain PA-C

## 2019-11-04 NOTE — NURSING NOTE
".  Chief Complaint   Patient presents with     RECHECK     Follow up weight management.       Vitals:    11/04/19 1305   BP: 127/71   BP Location: Left arm   Patient Position: Sitting   Cuff Size: Adult Regular   Pulse: 86   Temp: 100.6  F (38.1  C)   TempSrc: Oral   SpO2: 94%   Weight: 109 kg (240 lb 4.8 oz)   Height: 1.562 m (5' 1.5\")       Body mass index is 44.67 kg/m .                            SHUKRI ELDER, EMT    "

## 2019-11-04 NOTE — LETTER
2019       RE: Nena Tang  [address] [facility] [address]     Dear Colleague,    Thank you for referring your patient, Nena Tang, to the [facility] MEDICAL WEIGHT MANAGEMENT at [facility]. Please see a copy of my visit note below.    Return Medical Weight Management Note     Nena Tang  MRN:  [id number]  :  1960  GINNY:  2019    Dear Rowena Haas,    I had the pleasure of seeing your patient Nena Tang.  She is a 59 year old female who I am continuing to see for treatment of obesity related to:       2018   I have the following co-morbidities associated with obesity: Type II Diabetes, Pre-Diabetes, Heart Disease, High Blood Pressure, High Cholesterol     Last saw Lauren Bloch [facility] 5/15/19. She transitioned from Trulicity to Ozempic. Last visit increased ozempic to 0.5mg weekly and increased basaglar to 54 units daily. Saw MT on  and increased ozempic to 1mg daily  She lives in an assisted living facility and they help her with taking ozempic. Some mild constipation relieved with miralax but otherwise tolerating it well.      ED visit 5/15/19 for hyperglycemia and basaglar increased to 60 units  Novolog 25 units with meals  Blood sugars recently have been improved.  Fasting in the am ~150's  Blood sugars in the low 100's prior to meals     She feels ozempic is helping her to decrease her hunger and helping her to eat smaller portions.  She was 244 lbs in  and is 236 lbs today.      A1C May 7.6 and 6.6 19    INTERVAL HISTORY:  [facility] follow up.  Last visit was 19 and she has gained 4 lbs since last visit.  Taking 60 units basaglar and 20 novolog with meals  Ozempic helping blood sugars and A1C down from 7.6 to 6.6  Topamax on her medication list but she is unsure if she has still been taking it.     CURRENT WEIGHT:   240 lbs 4.8 oz    Wt Readings from Last 4 Encounters:   19 109 kg (240 lb 4.8 oz)  "  11/01/19 110.7 kg (244 lb)   10/25/19 110.2 kg (243 lb)   10/16/19 109.8 kg (242 lb)       Height:  5' 1.5\"  Body Mass Index:  Body mass index is 44.67 kg/m .  Vitals: /71 (BP Location: Left arm, Patient Position: Sitting, Cuff Size: Adult Regular)   Pulse 86   Temp 100.6  F (38.1  C) (Oral)   Ht 1.562 m (5' 1.5\")   Wt 109 kg (240 lb 4.8 oz)   LMP 01/06/2015   SpO2 94%   BMI 44.67 kg/m         Initial consult weight was 237 on 6/7/18.  Weight change since last seen on 7/22/2019 is down 4 pounds.   Total gain is 3 pounds.    Diet and Activity Changes Since Last Visit Reviewed With Patient 11/4/2019   I have made the following changes to my diet since my last visit: none   With regards to my diet, I am still struggling with: eating to much   I have made the following changes to my activity/exercise since my last visit: none   With regards to my activity/exercise, I am still struggling with: food       MEDICATIONS:   Current Outpatient Medications   Medication     aspirin (ASA) 81 MG EC tablet     atorvastatin (LIPITOR) 80 MG tablet     benzonatate (TESSALON) 100 MG capsule     benztropine (COGENTIN) 0.5 MG tablet     blood glucose (NO BRAND SPECIFIED) test strip     calcium carbonate (OS-ALLEN) 1500 (600 Ca) MG tablet     cholecalciferol (VITAMIN D3) 98856 units (1250 mcg) capsule     clonazePAM (KLONOPIN) 0.5 MG tablet     Continuous Blood Gluc Sensor (DEXCOM G5 MOB/G4 PLAT SENSOR) MISC     Continuous Blood Gluc Transmit (DEXCOM G5 MOBILE TRANSMITTER) MISC     dextromethorphan-guaiFENesin (MUCINEX DM)  MG 12 hr tablet     diphenhydrAMINE (BENADRYL) 25 MG tablet     gabapentin (NEURONTIN) 300 MG capsule     guaiFENesin (ROBITUSSIN) 100 MG/5ML liquid     ibuprofen (ADVIL/MOTRIN) 200 MG tablet     insulin aspart (NOVOLOG FLEXPEN) 100 UNIT/ML pen     insulin glargine (LANTUS SOLOSTAR PEN) 100 UNIT/ML pen     insulin pen needle (NOVOFINE 30) 30G X 8 MM miscellaneous     ipratropium - albuterol 0.5 " mg/2.5 mg/3 mL (DUONEB) 0.5-2.5 (3) MG/3ML neb solution     lidocaine (LIDODERM) 5 % patch     losartan (COZAAR) 50 MG tablet     melatonin 5 MG CAPS     metoprolol succinate ER (TOPROL-XL) 25 MG 24 hr tablet     nystatin (MYCOSTATIN) 185360 UNIT/GM external cream     nystatin (MYCOSTATIN) 478204 UNIT/GM external powder     order for DME     order for DME     order for DME     paliperidone ER (INVEGA) 9 MG 24 hr tablet     polyethylene glycol (MIRALAX) powder     ranitidine (ZANTAC) 300 MG tablet     semaglutide (OZEMPIC) 1 MG/DOSE pen     sertraline (ZOLOFT) 100 MG tablet     topiramate (TOPAMAX) 50 MG tablet     zinc oxide (DESITIN) 40 % external ointment     No current facility-administered medications for this visit.      Weight Loss Medication History Reviewed With Patient 11/4/2019   Which weight loss medications are you currently taking on a regular basis?  Topamax (topiramate)   Are you having any side effects from the weight loss medication that we have prescribed you? No     ASSESSMENT:   MWM follow up.  She is doing well on Ozempic which is helping her blood sugars and appetite but weight is not decreasing.  She has a lot of food cravings in the afternoon.    PLAN:  Continue Ozempic  Continue topiramate  Start naltrexone in the afternoons    FOLLOW-UP:    12 weeks.    Time: 15 min spent on evaluation, management, counseling, education, & motivational interviewing with greater than 50 % of the total time was spent on counseling and coordinating care    Sincerely,  Debbie Germain PA-C

## 2019-11-05 RX ORDER — CLONAZEPAM 0.5 MG/1
0.5 TABLET ORAL AT BEDTIME
Qty: 30 TABLET | Refills: 0 | OUTPATIENT
Start: 2019-11-05

## 2019-11-05 NOTE — TELEPHONE ENCOUNTER
[place] script in [place]  was written by PCP and was filled on 10/17/19 . Routed to PCP for review. Sakshi Viera RN November 5, 2019 2:27 PM

## 2019-11-06 LAB
COPATH REPORT: NORMAL
PAP: NORMAL

## 2019-11-07 LAB
FINAL DIAGNOSIS: NORMAL
HPV HR 12 DNA CVX QL NAA+PROBE: NEGATIVE
HPV16 DNA SPEC QL NAA+PROBE: NEGATIVE
HPV18 DNA SPEC QL NAA+PROBE: NEGATIVE
SPECIMEN DESCRIPTION: NORMAL
SPECIMEN SOURCE CVX/VAG CYTO: NORMAL

## 2019-11-12 ENCOUNTER — TELEPHONE (OUTPATIENT)
Dept: FAMILY MEDICINE | Facility: CLINIC | Age: 59
End: 2019-11-12

## 2019-11-12 NOTE — TELEPHONE ENCOUNTER
"Call from Nena & Gisselle (Manager at )over the weekend & lasting 3-4 days Nena had periods of being \"very sleepy\",hard to arouse, unsteady on her feet, legs were \"shakey at times, upper body was jerky\" per Adri pt had a hard time giving herself her own insulin, due to the tremors.  Per Nena her BS have been running 80's to  Low 200's.  When speaking to Adri the past 24- 48 hrs pt has been asymptomatic .   Call late afternoon to pt & she stated she had a \"mild episode\" of these symptoms at the [facility] in [place], \" someone had to call my name loud, a couple of times before I heard her & I was kind of shakey\" Pt stated she was fully oriented when she responded to the staff today.    Pt stated that she does not have recall of the events over the weekend.    Per fax sent to [facility] from  11/11/19, pt has had recent BS @ 499 & is poorly managing her diabetes.  Fax placed on PCP desk    Routing to PCP  Wendy Schwartz RN  "

## 2019-11-14 NOTE — TELEPHONE ENCOUNTER
Rowena,  Please see phone message below.    : last dispensed 11/06/2019 #7/7 days prescribed by Rowena CORDOVA CNP    Thank You!  Kalie Perez, RN  Triage Nurse

## 2019-11-14 NOTE — TELEPHONE ENCOUNTER
Reason for Call:  clonazePAM (KLONOPIN) 0.5 MG tablet    Detailed comments: Eran from [place] [facility] had called stating that medication needs to be refilled by tomorrow the latest or patient will go withouot medication for the weekend. Writer had told Eran that provider stated she will refill on refill date. Eran had also stated that the refill date is on Saturday and [facility] is actually closed on weekends that's why she would go without medication if it is not prescribed.    Pharmacy tel: [phone]

## 2019-11-14 NOTE — TELEPHONE ENCOUNTER
Will plan to follow up with patient at her next appointment. Patient does struggle with eating sweets and typically will get higher blood sugars on those occassions.   Unclear why the group home did not call EMS if they were concerned about the patient's symptoms over the weekend.   ART Oliver CNP

## 2019-11-15 RX ORDER — CLONAZEPAM 0.5 MG/1
0.5 TABLET ORAL AT BEDTIME
Qty: 30 TABLET | Refills: 0 | Status: SHIPPED | OUTPATIENT
Start: 2019-11-15 | End: 2019-12-16

## 2019-11-17 ENCOUNTER — APPOINTMENT (OUTPATIENT)
Dept: GENERAL RADIOLOGY | Facility: CLINIC | Age: 59
End: 2019-11-17
Attending: EMERGENCY MEDICINE
Payer: MEDICARE

## 2019-11-17 ENCOUNTER — MEDICAL CORRESPONDENCE (OUTPATIENT)
Dept: HEALTH INFORMATION MANAGEMENT | Facility: CLINIC | Age: 59
End: 2019-11-17

## 2019-11-17 ENCOUNTER — HOSPITAL ENCOUNTER (EMERGENCY)
Facility: CLINIC | Age: 59
Discharge: HOME | End: 2019-11-17
Attending: EMERGENCY MEDICINE | Admitting: EMERGENCY MEDICINE
Payer: MEDICARE

## 2019-11-17 ENCOUNTER — APPOINTMENT (OUTPATIENT)
Dept: CT IMAGING | Facility: CLINIC | Age: 59
End: 2019-11-17
Attending: EMERGENCY MEDICINE
Payer: MEDICARE

## 2019-11-17 VITALS
TEMPERATURE: 97.3 F | OXYGEN SATURATION: 95 % | HEART RATE: 73 BPM | SYSTOLIC BLOOD PRESSURE: 129 MMHG | DIASTOLIC BLOOD PRESSURE: 71 MMHG | RESPIRATION RATE: 19 BRPM
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
## 2019-11-17 DIAGNOSIS — R25.1 TREMOR: ICD-10-CM

## 2019-11-17 DIAGNOSIS — R42 LIGHTHEADEDNESS: ICD-10-CM

## 2019-11-17 LAB
ALBUMIN SERPL-MCNC: 3.4 G/DL (ref 3.4–5)
ALBUMIN UR-MCNC: NEGATIVE MG/DL
ALP SERPL-CCNC: 92 U/L (ref 40–150)
ALT SERPL W P-5'-P-CCNC: 19 U/L (ref 0–50)
AMORPH CRY #/AREA URNS HPF: ABNORMAL /HPF
ANION GAP SERPL CALCULATED.3IONS-SCNC: 4 MMOL/L (ref 3–14)
APPEARANCE UR: CLEAR
AST SERPL W P-5'-P-CCNC: 18 U/L (ref 0–45)
BACTERIA #/AREA URNS HPF: ABNORMAL /HPF
BASOPHILS # BLD AUTO: 0 10E9/L (ref 0–0.2)
BASOPHILS NFR BLD AUTO: 0.6 %
BILIRUB SERPL-MCNC: 0.1 MG/DL (ref 0.2–1.3)
BILIRUB UR QL STRIP: NEGATIVE
BUN SERPL-MCNC: 20 MG/DL (ref 7–30)
CALCIUM SERPL-MCNC: 9.4 MG/DL (ref 8.5–10.1)
CHLORIDE SERPL-SCNC: 109 MMOL/L (ref 94–109)
CO2 SERPL-SCNC: 26 MMOL/L (ref 20–32)
COLOR UR AUTO: ABNORMAL
CREAT SERPL-MCNC: 1.02 MG/DL (ref 0.52–1.04)
DIFFERENTIAL METHOD BLD: ABNORMAL
EOSINOPHIL # BLD AUTO: 0.1 10E9/L (ref 0–0.7)
EOSINOPHIL NFR BLD AUTO: 1.6 %
ERYTHROCYTE [DISTWIDTH] IN BLOOD BY AUTOMATED COUNT: 13.3 % (ref 10–15)
GFR SERPL CREATININE-BSD FRML MDRD: 60 ML/MIN/{1.73_M2}
GLUCOSE BLDC GLUCOMTR-MCNC: 130 MG/DL (ref 70–99)
GLUCOSE BLDC GLUCOMTR-MCNC: 66 MG/DL (ref 70–99)
GLUCOSE BLDC GLUCOMTR-MCNC: 93 MG/DL (ref 70–99)
GLUCOSE SERPL-MCNC: 70 MG/DL (ref 70–99)
GLUCOSE UR STRIP-MCNC: NEGATIVE MG/DL
HCT VFR BLD AUTO: 34.6 % (ref 35–47)
HGB BLD-MCNC: 10.6 G/DL (ref 11.7–15.7)
HGB UR QL STRIP: NEGATIVE
IMM GRANULOCYTES # BLD: 0 10E9/L (ref 0–0.4)
IMM GRANULOCYTES NFR BLD: 0.4 %
KETONES UR STRIP-MCNC: NEGATIVE MG/DL
LEUKOCYTE ESTERASE UR QL STRIP: ABNORMAL
LIPASE SERPL-CCNC: 165 U/L (ref 73–393)
LYMPHOCYTES # BLD AUTO: 2 10E9/L (ref 0.8–5.3)
LYMPHOCYTES NFR BLD AUTO: 28.9 %
MCH RBC QN AUTO: 30.3 PG (ref 26.5–33)
MCHC RBC AUTO-ENTMCNC: 30.6 G/DL (ref 31.5–36.5)
MCV RBC AUTO: 99 FL (ref 78–100)
MONOCYTES # BLD AUTO: 0.7 10E9/L (ref 0–1.3)
MONOCYTES NFR BLD AUTO: 9.6 %
MUCOUS THREADS #/AREA URNS LPF: PRESENT /LPF
NEUTROPHILS # BLD AUTO: 4.1 10E9/L (ref 1.6–8.3)
NEUTROPHILS NFR BLD AUTO: 58.9 %
NITRATE UR QL: NEGATIVE
NRBC # BLD AUTO: 0 10*3/UL
NRBC BLD AUTO-RTO: 0 /100
PH UR STRIP: 7 PH (ref 5–7)
PLATELET # BLD AUTO: 206 10E9/L (ref 150–450)
POTASSIUM SERPL-SCNC: 4.5 MMOL/L (ref 3.4–5.3)
PROT SERPL-MCNC: 7.9 G/DL (ref 6.8–8.8)
RBC # BLD AUTO: 3.5 10E12/L (ref 3.8–5.2)
RBC #/AREA URNS AUTO: 2 /HPF (ref 0–2)
SODIUM SERPL-SCNC: 139 MMOL/L (ref 133–144)
SOURCE: ABNORMAL
SP GR UR STRIP: 1.03 (ref 1–1.03)
SQUAMOUS #/AREA URNS AUTO: 4 /HPF (ref 0–1)
TROPONIN I SERPL-MCNC: <0.015 UG/L (ref 0–0.04)
UROBILINOGEN UR STRIP-MCNC: NORMAL MG/DL (ref 0–2)
WBC # BLD AUTO: 7 10E9/L (ref 4–11)
WBC #/AREA URNS AUTO: 4 /HPF (ref 0–5)

## 2019-11-17 PROCEDURE — 25000132 ZZH RX MED GY IP 250 OP 250 PS 637: Mod: GY | Performed by: EMERGENCY MEDICINE

## 2019-11-17 PROCEDURE — 93005 ELECTROCARDIOGRAM TRACING: CPT

## 2019-11-17 PROCEDURE — 81001 URINALYSIS AUTO W/SCOPE: CPT | Performed by: EMERGENCY MEDICINE

## 2019-11-17 PROCEDURE — 25800030 ZZH RX IP 258 OP 636: Performed by: EMERGENCY MEDICINE

## 2019-11-17 PROCEDURE — 85025 COMPLETE CBC W/AUTO DIFF WBC: CPT | Performed by: EMERGENCY MEDICINE

## 2019-11-17 PROCEDURE — 83690 ASSAY OF LIPASE: CPT | Performed by: EMERGENCY MEDICINE

## 2019-11-17 PROCEDURE — 80053 COMPREHEN METABOLIC PANEL: CPT | Performed by: EMERGENCY MEDICINE

## 2019-11-17 PROCEDURE — 73030 X-RAY EXAM OF SHOULDER: CPT | Mod: RT

## 2019-11-17 PROCEDURE — 70498 CT ANGIOGRAPHY NECK: CPT

## 2019-11-17 PROCEDURE — 70450 CT HEAD/BRAIN W/O DYE: CPT

## 2019-11-17 PROCEDURE — 99285 EMERGENCY DEPT VISIT HI MDM: CPT | Mod: 25

## 2019-11-17 PROCEDURE — 25000128 H RX IP 250 OP 636: Performed by: EMERGENCY MEDICINE

## 2019-11-17 PROCEDURE — 73502 X-RAY EXAM HIP UNI 2-3 VIEWS: CPT

## 2019-11-17 PROCEDURE — 00000146 ZZHCL STATISTIC GLUCOSE BY METER IP

## 2019-11-17 PROCEDURE — 87086 URINE CULTURE/COLONY COUNT: CPT | Performed by: EMERGENCY MEDICINE

## 2019-11-17 PROCEDURE — 84484 ASSAY OF TROPONIN QUANT: CPT | Performed by: EMERGENCY MEDICINE

## 2019-11-17 RX ORDER — HYDROCODONE BITARTRATE AND ACETAMINOPHEN 5; 325 MG/1; MG/1
1 TABLET ORAL ONCE
Status: COMPLETED | OUTPATIENT
Start: 2019-11-17 | End: 2019-11-17

## 2019-11-17 RX ORDER — IOPAMIDOL 755 MG/ML
500 INJECTION, SOLUTION INTRAVASCULAR ONCE
Status: COMPLETED | OUTPATIENT
Start: 2019-11-17 | End: 2019-11-17

## 2019-11-17 RX ADMIN — HYDROCODONE BITARTRATE AND ACETAMINOPHEN 1 TABLET: 5; 325 TABLET ORAL at 14:01

## 2019-11-17 RX ADMIN — IOPAMIDOL 70 ML: 755 INJECTION, SOLUTION INTRAVENOUS at 16:22

## 2019-11-17 RX ADMIN — SODIUM CHLORIDE 80 ML: 9 INJECTION, SOLUTION INTRAVENOUS at 16:22

## 2019-11-17 ASSESSMENT — ENCOUNTER SYMPTOMS
TREMORS: 1
PALPITATIONS: 0
ABDOMINAL PAIN: 0
ARTHRALGIAS: 1
DIZZINESS: 1
NAUSEA: 0

## 2019-11-17 NOTE — ED TRIAGE NOTES
Patient presents with group home staff member after fall this morning as she was making her bed. Patient states she has been feeling dizzy and she had an LOC before she fell. Patient states she is having pain in her right hip and left shoulder. ABCDs intact, alert and oriented x 4.

## 2019-11-17 NOTE — ED PROVIDER NOTES
"  History     Chief Complaint:  Fall    The history is provided by the patient and a caregiver.      Nena Tang is a 59 year old female, with complicated past medical history as noted below including hypertension, hyperlipidemia, schizoaffective disorder, type II diabetes, uterine cancer, Takotsubo cardiomyopathy, migraines, cocaine abuse, heroin abuse amongst others, not currently anticoagulated, who presents with her caregiver from [facility] for evaluation of an unwitnessed fall prior to arrival. Per patient, she was making her bed and pulling her blanket back towards her when \"I passed out\" and fell. Patient states that she was feeling dizzy prior to the fall, syncopized and then fell, hurting her right hip and left shoulder. Due to fall, patient was presented for further evaluation.     Here, patient states that she has \"not been herself\" for the last couple months, endorsing that her \"hand jerks\" and she drops a lot of things. She states that as she was blacking out, she went to grab the bed handle, but her hand twitched so she fell backwards. Of note, she has recently completed a course of Azithromycin. She denies any nausea, abdominal pain or palpitations.     Care Facility Note  Per incident report from care facility, as noted below, patient has been more reliant on staff support in her diabetes management.     \"There seems to be a recent general health status that I probably should have addressed first. On 11/11/19 Nena was unable to administer insulin at bedtime without staff guidance to stabilize her hands due to involuntary shaking and jerks. She has increased lethargy and unsteady gait in the last couple of weeks. This has the appearance of sleepiness or spaciness. -- Sushant Molly [facility]\"          Allergies:  Imidazole  Ketoprofen  Lisinopril  Metformin  Metronidazole  Posaconazole     Medications:    Aspirin 81 " mg  Lipitor  Tessalon  Cogentin  Klonopin  Benadryl  Neurontin  Novolog flexpen  Lantus solostar  Duoneb neb solution  Lidoderm 5%  Cozaar  Melatonin  Metoprolol  Naltrexone  Mycostatin  Invega  Miralax  Zantac  Zoloft  Semaglutide  Topamax  Imitrex   Ozempic    Past Medical History:    Acute respiratory failure with hypoxia  CAD  Chronic low back pain  Cocaine abuse, in remission  Fecal urgency  Heroin abuse  Hyperlipidemia  Illiterate  IBS  Left cataract  Migraine  Moderate major depression  Noncompliance with medication  Obesity  CINDY  Osteopenia  Pneumonia of right lower lobe due to infectious organism  PTSD  Psychophysiological insomnia  Schizoaffective disorder, depressive type  Sepsis  Suicidal intent  Takotsubo cardiomyopathy  Type II diabetes  Uterine cancer  Verbal auditory hallucination    Past Surgical History:    Oophorectomy - uterine  Cataract - bilateral  Colonoscopy  Coronary CTA  Hysterectomy  Laparoscopic cholecystectomy  Phacoemulsification clear cornea with standard intraocular lens implant - bilateral  Release trigger finger x2    Family History:    Mother - bladder cancer, COPD, cirrhosis of liver, alcohol/drug, diabetes, hypertension, lipids, CAD, glaucoma  Sister(s) - alcohol/drug, mental illness, psychotic disorder  Brother(s) - colon cancer, colorectal cancer, alcohol/drug, heroin overdose    Social History:  The patient was accompanied to the ED by caregiver.  Smoking Status: No  Smokeless Tobacco: No  Alcohol Use: Yes  Drug Use: Former   Marital Status:   [5]     Review of Systems   Cardiovascular: Negative for palpitations.   Gastrointestinal: Negative for abdominal pain and nausea.   Genitourinary: Positive for pelvic pain.   Musculoskeletal: Positive for arthralgias.   Neurological: Positive for dizziness, tremors and syncope.   All other systems reviewed and are negative.      Physical Exam     Patient Vitals for the past 24 hrs:   BP Temp Temp src Pulse Heart Rate Resp SpO2    11/17/19 2045 129/71 -- -- 73 -- -- --   11/17/19 2030 (!) 147/65 -- -- 76 -- -- --   11/17/19 2015 (!) 147/80 -- -- 74 -- -- 95 %   11/17/19 2012 -- -- -- -- -- -- 94 %   11/17/19 2011 -- -- -- -- -- -- 95 %   11/17/19 2009 (!) 143/80 -- -- -- -- -- 96 %   11/17/19 1500 -- -- -- -- 70 19 --   11/17/19 1321 (!) 156/87 97.3  F (36.3  C) Temporal -- 77 20 96 %       Physical Exam  Constitutional: Vital signs reviewed as above.   Head: No external signs of trauma noted.  Eyes: Pupils are equal, round, and reactive to light.   Neck: No JVD noted  Cardiovascular: Normal rate, regular rhythm and normal heart sounds.  No murmur heard. Equal B/L peripheral pulses.  Pulmonary/Chest: Effort normal and breath sounds normal. No respiratory distress. Patient has no wheezes. Patient has no rales.   Gastrointestinal: Soft. There is no tenderness.   Musculoskeletal/Extremities: No edema noted. Normal tone. There is right trochanteric TTP. There is right shoulder TTP.   Neurological: Patient is alert and oriented to person, place, and time. There seems to be a mild tremor in the B/L hands (R>L)   Skin: Skin is warm and dry. There is no diaphoresis noted. Bruising noted on the right hip.  Psychiatric: The patient appears calm.      Emergency Department Course     ECG (14:13:25):  Rate 73 bpm. NY interval 174. QRS duration 86. QT/QTc 384/423. P-R-T axes 47 -48 -10. Normal sinus rhythm. Left axis deviation. Abnormal ECG. Agree with computer interpretation. No significant change compared to EKG dated 10/5/19. Interpreted at 1417 by De Miller DO.     Imaging:  Radiology findings were communicated with the patient and caregiver who voiced understanding of the findings.    Head CT w/o Contrast:  No acute intracranial abnormality.  Reading per radiology.    CTA Head Neck with Contrast:  Severely limited exam due to suboptimal bolus/imaging timing. No large  vessel occlusions are appreciated.  Reading per radiology.    XR  Pelvis w Hip Right:  No acute abnormality is seen.  Reading per radiology.    XR Shoulder Right:  No fracture or acute abnormality is seen. Again  demonstrated are mild degenerative changes of the acromioclavicular  joint. No other abnormality is noted. I see no definite change since  the previous examination.   Reading per radiology.     Laboratory:  Laboratory findings were communicated with the patient and caregiver who voiced understanding of the findings.    CBC: WBC 7.0, HGB 10.6 (L),    CMP: GFR 60 (L), bilirubin 0.1 (L), o/w WNL (Creatinine: 1.02)  Lipase: 165  Troponin (Collected 1355): <0.015  Glucose: 66 (L)  Glucose by meter: 130 (H)     UA: leukocyte esterase small, bacteria few, squamous epithelial 4 (H), mucous present, amorphous crystals few, o/w negative   Urine Culture: Pending       Interventions:  1401: Norco 5-325 mg per tablet 1 tablet PO    Emergency Department Course:  Past medical records, nursing notes, and vitals reviewed.    1330: I performed an exam of the patient as documented above. History obtained from patient and caregiver.    EKG obtained in the ED, see results above.     IV was inserted and blood was drawn for laboratory testing, results above.    The patient provided a urine sample here in the emergency department. This was sent for laboratory testing, findings above.    The patient was sent for a Head CT w/o Contrast, CTA Head Neck with Contrast, XR Pelvis w Hip Right, XR Shoulder Right while in the emergency department, results above.     2031: I rechecked the patient and discussed the results of her workup thus far.     Findings and plan explained to the Patient. Patient discharged home with instructions regarding supportive care, medications, and reasons to return. The importance of close follow-up was reviewed.     I personally reviewed the laboratory results with the Patient and answered all related questions prior to discharge.     Impression & Plan     Medical  Decision Making:  Nena Tang is a 59 year old female who presents to the ED due to several complaints. Please see the HPI and exam for specifics. The patient remains stable in the ED. No fracture was found. No specific cause for the blacking out spell was found either. Based on the patient's description, it does not sound like she actually had a syncopal event. She was monitored and was felt able to go back to her care facility. Further consideration can be given to the concerns of the facility regarding blood sugar control by the primary care clinic. Anticipatory guidance given prior to discharge.        Discharge Diagnosis:    ICD-10-CM    1. Fall, initial encounter W19.XXXA Urine Culture   2. Tremor R25.1    3. Lightheadedness R42        Disposition:  Discharged to her group home.      Scribe Disclosure:  IChristin, am serving as a scribe at 1:30 PM on 11/17/2019 to document services personally performed by De Miller DO based on my observations and the provider's statements to me.      INorma, am serving as a scribe at 3:42 PM on 11/17/2019 to document services personally performed by De Miller DO based on my observations and the provider's statements to me.    11/17/2019   [facility] EMERGENCY DEPARTMENT       De Miller DO  11/17/19 [id number]

## 2019-11-17 NOTE — ED NOTES
Patient ambulated to and from restroom with assistance of walker. Tolerated well. C/o dizziness.  Placed back on monitor. Has no other immediate needs at this times, will continue to monitor

## 2019-11-17 NOTE — ED AVS SNAPSHOT
[facility] Emergency Department  [address]  [facility] [address]  Phone:  [phone]  Fax:  [phone]                                    Nena Tang   MRN: [id number]    Department:  [facility] Emergency Department   Date of Visit:  11/17/2019           After Visit Summary Signature Page    I have received my discharge instructions, and my questions have been answered. I have discussed any challenges I see with this plan with the nurse or doctor.    ..........................................................................................................................................  Patient/Patient Representative Signature      ..........................................................................................................................................  Patient Representative Print Name and Relationship to Patient    ..................................................               ................................................  Date                                   Time    ..........................................................................................................................................  Reviewed by Signature/Title    ...................................................              ..............................................  Date                                               Time          22EPIC Rev 08/18

## 2019-11-18 LAB
BACTERIA SPEC CULT: NORMAL
INTERPRETATION ECG - MUSE: NORMAL
Lab: NORMAL
SPECIMEN SOURCE: NORMAL

## 2019-11-18 NOTE — ED NOTES
Called Zheng Banres [phone] who said he is 10 minutes away and will come pick her up. Pt assisted in getting dressed and walked out to lobby where she said she would like to wait for him.

## 2019-11-19 NOTE — RESULT ENCOUNTER NOTE
Final urine culture report is NEGATIVE per [facility] ED Lab Result protocol.    If NEGATIVE result, no change in treatment, per [facility] ED Lab Result protocol.

## 2019-11-21 ENCOUNTER — OFFICE VISIT (OUTPATIENT)
Dept: BEHAVIORAL HEALTH | Facility: CLINIC | Age: 59
End: 2019-11-21
Payer: MEDICARE

## 2019-11-21 ENCOUNTER — OFFICE VISIT (OUTPATIENT)
Dept: FAMILY MEDICINE | Facility: CLINIC | Age: 59
End: 2019-11-21
Payer: MEDICARE

## 2019-11-21 VITALS
WEIGHT: 245 LBS | OXYGEN SATURATION: 93 % | BODY MASS INDEX: 45.54 KG/M2 | HEART RATE: 93 BPM | RESPIRATION RATE: 18 BRPM | SYSTOLIC BLOOD PRESSURE: 118 MMHG | DIASTOLIC BLOOD PRESSURE: 62 MMHG | TEMPERATURE: 97.4 F

## 2019-11-21 DIAGNOSIS — E66.01 MORBID OBESITY (H): ICD-10-CM

## 2019-11-21 DIAGNOSIS — Z79.4 TYPE 2 DIABETES MELLITUS WITH STAGE 3 CHRONIC KIDNEY DISEASE, WITH LONG-TERM CURRENT USE OF INSULIN (H): ICD-10-CM

## 2019-11-21 DIAGNOSIS — N18.30 TYPE 2 DIABETES MELLITUS WITH STAGE 3 CHRONIC KIDNEY DISEASE, WITH LONG-TERM CURRENT USE OF INSULIN (H): ICD-10-CM

## 2019-11-21 DIAGNOSIS — F43.10 POSTTRAUMATIC STRESS DISORDER: ICD-10-CM

## 2019-11-21 DIAGNOSIS — G24.01 TARDIVE DYSKINESIA: Primary | ICD-10-CM

## 2019-11-21 DIAGNOSIS — E11.3299 TYPE 2 DIABETES MELLITUS WITH MILD NONPROLIFERATIVE RETINOPATHY WITHOUT MACULAR EDEMA, WITH LONG-TERM CURRENT USE OF INSULIN, UNSPECIFIED LATERALITY (H): ICD-10-CM

## 2019-11-21 DIAGNOSIS — R29.6 FALLS FREQUENTLY: ICD-10-CM

## 2019-11-21 DIAGNOSIS — Z79.4 TYPE 2 DIABETES MELLITUS WITH MILD NONPROLIFERATIVE RETINOPATHY WITHOUT MACULAR EDEMA, WITH LONG-TERM CURRENT USE OF INSULIN, UNSPECIFIED LATERALITY (H): ICD-10-CM

## 2019-11-21 DIAGNOSIS — F25.1 SCHIZOAFFECTIVE DISORDER, DEPRESSIVE TYPE (H): Primary | ICD-10-CM

## 2019-11-21 DIAGNOSIS — E11.22 TYPE 2 DIABETES MELLITUS WITH STAGE 3 CHRONIC KIDNEY DISEASE, WITH LONG-TERM CURRENT USE OF INSULIN (H): ICD-10-CM

## 2019-11-21 PROCEDURE — 90834 PSYTX W PT 45 MINUTES: CPT | Performed by: SOCIAL WORKER

## 2019-11-21 PROCEDURE — 99215 OFFICE O/P EST HI 40 MIN: CPT | Performed by: NURSE PRACTITIONER

## 2019-11-21 NOTE — PROGRESS NOTES
[facility]   November 21, 2019    Behavioral Health Clinician Progress Note    Patient Name: Nena Tang           Service Type:  Individual      Service Location:   Face to Face in Clinic     Session Start Time: 10:20am  Session End Time: 11:05am      Session Length: 38 - 52      Attendees: Patient and working from her home    Visit Activities (Refresh list every visit): [facility] [employer]     Diagnostic Assessment Date: 1-23-18  CGI date completed: 11-  Treatment Plan Review Date: 12-  See Flowsheets for today's PHQ-9 and TAMIA-7 results  Previous PHQ-9:   PHQ-9 SCORE 3/13/2018 10/26/2018 5/7/2019   PHQ-9 Total Score - - -   PHQ-9 Total Score - - -   PHQ-9 Total Score 14 20 22     Previous TAMIA-7:   TAMIA-7 SCORE 2/3/2017 3/13/2018 10/26/2018   Total Score - - -   Total Score 0 17 19   Total Score - - -       ALEXANDRA LEVEL:  ALEXANDRA Score (Last Two) 8/30/2011 6/9/2014   ALEXANDRA Raw Score 42 37   Activation Score 66 49.9   ALEXANDRA Level 3 2       DATA  Extended Session (60+ minutes): No  Interactive Complexity: No  Crisis: No  [facility] Patient: No    Treatment Objective(s) Addressed in This Session:  Target Behavior(s): disease management/lifestyle changes mental health    Depressed Mood: Increase interest, engagement, and pleasure in doing things  Decrease frequency and intensity of feeling down, depressed, hopeless  Improve quantity and quality of night time sleep / decrease daytime naps  Identify negative self-talk and behaviors: challenge core beliefs, myths, and actions  Improve concentration, focus, and mindfulness in daily activities   Anxiety: will experience a reduction in anxiety and will increase ability to function adaptively  Functional Impairment: will effectively address problems that interfere with adaptive functioning  Alcohol / Substance Use: continue to make healthy choices regarding substance use and engage in activities / supportive services that promote sobriety  Thought  Disturbance: will develop skills to more effectively manage symptoms, will develop more effective support systems and will continue to take medications as prescribed    Current Stressors / Issues:    The patient was seen by [facility] per the request of the PCP-she had a recent fall where she hit her hip-the worker came to the visit with the patient and stated that the patient has had some issues with her memory-were she is forgetting things that just occurred-in the past 2 weeks the patient will wake in the morning and having some jerking movements and have some cognitive changes-responses are slow and will look at other with a vacant look-the worker stated that these symptoms of waking with these symptoms of concern have progressed having staying for longer periods-she has a new friend that she is pending time with-she has more social connections-new medication, new social connection and not using CPAP-she has been having good blood sugars-the patient reported that she has noticed that she has some difficulty with fine motor abilities-happens in the mornings and when she returns from spending time with her friend-she has been spending time with daughter in law who is in active addiction and spending time with her friend who she use to drink alcohol with-the patient reported that she has been dazed during the day-she sees the man and she stated she don't care about him-has thoughts of wanting to hurt self-she at times has plans with no action and no intention to harm self-the conversation makes me angry and confused and agitated-the patient agreed to let her staff know if she is feeling unsafe-the patient reported that the medication is helping her lose weight-the worker talked about how the patient's perception has been off about things-      Progress on Treatment Objective(s) / Homework:  No improvement - PREPARATION (Decided to change - considering how); Intervened by negotiating a change plan and determining options /  strategies for behavior change, identifying triggers, exploring social supports, and working towards setting a date to begin behavior change    Motivational Interviewing    MI Intervention: Expressed Empathy/Understanding, Supported Autonomy, Collaboration, Evocation, Permission to raise concern or advise, Open-ended questions, Reflections: simple and complex, Rolled with resistance: Emphasized patient autonomy, Simple reflection and Evoked patient agenda and Change talk (evoked)     Change Talk Expressed by the Patient: Desire to change Ability to change Reasons to change Need to change Activation Taking steps    Provider Response to Change Talk: E - Evoked more info from patient about behavior change, A - Affirmed patient's thoughts, decisions, or attempts at behavior change, R - Reflected patient's change talk and S - Summarized patient's change talk statements      Care Plan review completed: No    Medication Review:  No changes to current psychiatric medication(s)     Medication Compliance:  NA    Changes in Health Issues:   None reported     Chemical Use Review:   Substance Use: Chemical use reviewed, no active concerns identified      Tobacco Use: No current tobacco use.      Assessment: Current Emotional / Mental Status (status of significant symptoms):  Risk status (Self / Other harm or suicidal ideation)  Patient has had a history of suicidal ideation: yes  Patient denies current fears or concerns for personal safety.  Patient denies current or recent suicidal ideation or behaviors.  Patient denies current or recent homicidal ideation or behaviors.  Patient denies current or recent self injurious behavior or ideation.  Patient denies other safety concerns.  A safety and risk management plan has not been developed at this time, however patient was encouraged to call [facility] should there be a change in any of these risk factors.    Appearance:   Appropriate   Eye Contact:   Good   Psychomotor  Behavior: Normal   Attitude:   Cooperative   Orientation:   All  Speech   Rate / Production: Normal    Volume:  Normal   Mood:    Depressed  Normal  Affect:    Appropriate  Worrisome   Thought Content:  Clear   Thought Form:  Coherent  Goal Directed  Logical   Insight:    Fair     Diagnoses:  1. Schizoaffective disorder, depressive type (H)    2. Posttraumatic stress disorder        Collateral Reports Completed:  Not Applicable    Plan: (Homework, other):  Patient was given information about behavioral services and encouraged to schedule a follow up appointment with the clinic [facility] as needed to work on helping the patient with management of mood states and to work on stress management around her grieving process-also to help the patient with her behavioral needs to comply with treatment medical recommendations.  She was also given information about mental health symptoms and treatment options .  CD Recommendations: Maintain Sobriety.    Richardson Lopez [facility], [facility]                                                    Treatment Plan    Client's Name: Nena Tang  YOB: 1960    Date: 6-4-2019    DSM5 Diagnoses: (Sustained by DSM5 Criteria Listed Above)  Diagnoses:  295.70 (F25.0), Schizoaffective disorder, depressive type; 309.81 (F43.10) Posttraumatic Stress Disorder with panic specifier, history of chemical dependency issues (polysubstance dependence)  Psychosocial & Contextual Factors: Academics / Education - yes  Activities of Daily Living - yes  Financial management yes  Follow through with Medical recommendations - yes  Occupational / Vocational - yes  Social / Relational - yes, grief and loss  WHODAS Score: 30      Referral / Collaboration:  Referral to another professional/service is not indicated at this time..    Anticipated number of session or this episode of care: 20      MeasurableTreatment Goal(s) related to diagnosis / functional impairment(s)  Goal 1: Client will improve her ability to  manage anxiety and mood states.     I will know I've met my goal when the man does not paralyze the me with fear.     Objective #A (Client Action)    Client will increase understanding of steps in the grief process.  Status: Continued - Date(s):  9-: able to remember the good time and able to remember these important people without wanting to die-she stated that she needs more work to be able to remember and not fall apart.     Intervention(s)  Therapist will teach emotional recognition/identification. teach the navigation of grieving encouraging acceptance and adjustment.    Objective #B  Client will Decrease frequency and intensity of feeling down, depressed, hopeless  Decrease thoughts that you'd be better off dead or of suicide / self-harm.  Status: Continued - Date(s):   9-: she does not think about harming the self as much     Intervention(s)  Therapist will teach emotional regulation skills. with the use of mindful coping strategies and open communication of feelings and thoughts (getting it out to another person).    Objective #C  Client will identify at least 3 example(s) of how drinking has resulted in an experience that interferes with person values or goals.  Status: Completed - Date: She stated she was sober  for 25 years. 9-    Intervention(s)  Therapist will teach the client how to perform a behavioral chain analysis. Process the experience of playing the tape forward to help with making the next right decision. .          Client has reviewed and agreed to the above plan.      Richardson Lopez, [facility]  June 4, 2019          ______________________________________________________________________

## 2019-11-21 NOTE — PATIENT INSTRUCTIONS
-Decrease naltrexone to half tablet for one week and then discontinue.   -Use your CPAP for the next 2 weeks as discussed.   -Check spontaneous blood sugars in the night at 2 am and 4 am; three times per week until follow-up appointment.  -Call clinic with any questions or medication.

## 2019-11-21 NOTE — TELEPHONE ENCOUNTER
"Requested Prescriptions   Pending Prescriptions Disp Refills     insulin pen needle (NOVOFINE 30) 30G X 8 MM miscellaneous 400 each 0     Sig: USE 4 DAILY OR AS DIRECTED   Last Written Prescription Date:  08/07/2019  Last Fill Quantity: 400 Each,  # refills: 0   Last office visit: No previous visit found with prescribing provider:     Future Office Visit:   Next 5 appointments (look out 90 days)    Dec 03, 2019 10:30 AM CST  Return Visit with ART Arias CNP  [facility] ([facility] [address]  [address]  [facility] [id number]  [phone]   Dec 03, 2019 10:30 AM CST  Office Visit with Eugenia De Jesus RPH  [facility] ([facility]) [address] [place] [address]  [address] [place] [id number] [phone]   Dec 03, 2019 10:30 AM CST  [facility]  [facility] ([facility]) [address]  [address] [place] [id number]  [phone]   Dec 04, 2019  2:00 PM CST  (Arrive by 1:45 PM)  [facility] [address] [place] [id number] [phone]   Dec 10, 2019 11:00 AM CST  Return Visit with ART Arias CNP  [facility] ([facility]) [address] [place] [address]  [address] [place] [id number]  [phone]             Diabetic Supplies Protocol Passed - 11/21/2019  2:00 PM        Passed - Medication is active on med list        Passed - Patient is 18 years of age or older        Passed - Recent (6 mo) or future (30 days) visit within the authorizing provider's specialty     Patient had office visit in the last 6 months or has a visit in the next 30 days with authorizing provider.  See \"Patient Info\" tab in inbasket, or " "\"Choose Columns\" in Meds & Orders section of the refill encounter.              "

## 2019-11-21 NOTE — PROGRESS NOTES
SUBJECTIVE:                                                    Nena Tang is a 59 year old female who presents to clinic today for the following health issues:  [facility]: Don    Fall: Patient reports that she was dizzy and fell because she missed the side rail of her bed and hit her right hip on the floor; unsure if her head hit the floor. Woke up from the floor, did not tell anyone until about 4 hours later after [facility] and that's when she was taken to the ED.     Memory changes: Patient is having cognitive changes in memory and and her cognition.  Reports that she has has been having jerky movement when she wakes up. Reports that her responses are slow with a distant look. And then comes back to baseline after a few seconds. Feels like it has been progressively been getting worse. Feels like since the new medication, the symptoms have increased.     Patient reports that she has not been using her CPAP.     Has trouble checking blood sugars in the morning, because of the jerking movements of her hands.        ED/ Followup:  Facility:  [facility] [place]  Date of visit: 11/17/19  Reason for visit: Fall, loss of consciousness/dizziness  Current Status: Still struggling with the over sedation feel.        Dizziness    Duration: 11/17/19. Patient reports that her fasting blood sugars have been ranging between 160-200's in the morning.     Description   Feeling faint:  no   Feeling like the surroundings are moving: YES  Loss of consciousness or falls: YES- 11/17/19    Intensity:  mild, moderate    Accompanying signs and symptoms:   Nausea/vomitting: YES  Palpitations: no   Weakness in arms or legs: YES  Vision or speech changes: no   Ringing in ears (Tinnitus): no   Hearing loss related to dizziness: no   Other (fevers/chills/sweating/dyspnea): no     History (similar episodes/head trauma/previous evaluation/recent bleeding): None    Precipitating or alleviating factors (new meds/chemicals): None  Worse with  activity/head movement: no     Therapies tried and outcome: None      Mental Health Follow up: Schizoaffective, PTSD  Patient reports that she has reconnected with a friend who was recently released from shelter. Patent states that she has a past history of alcohol use but denies nay recent use. Patient also denies any use of substance although her caregiver notes that her symptoms appear worse when she comes back to the group home after being with her friend.     Status since last visit: Worsening memory and some cognition changes.     See PHQ-9 for current symptoms.  Other associated symptoms: None    Complicating factors: recent fall.   Significant life event:  No   Current substance abuse:  None  Anxiety or Panic symptoms:  No    Sleep - good, has not been using her CPAP though.   Appetite - good.  Exercise - [facility].     Smoking - denies.   Alcohol - denies.  Street drugs - denies any use.   Marijuana - denies.     PHQ-9  English PHQ-9   Any Language               Problems taking medications regularly: No    Medication side effects: none    Diet: regular (no restrictions)    Social History     Tobacco Use     Smoking status: Never Smoker     Smokeless tobacco: Never Used   Substance Use Topics     Alcohol use: No     Frequency: Never     Comment: last month        Problem list and histories reviewed & adjusted, as indicated.  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 (H)     Illiterate     Irritable bowel syndrome     overweight - BMI >35     Takotsubo cardiomyopathy     CAD (coronary artery disease)     Restless legs syndrome (RLS)     CINDY (obstructive sleep apnea)- mild AHI 10.3     Verbal auditory hallucination     Chronic low back pain     Schizoaffective disorder,  depressive type (H)     Migraine headache     HTN, goal below 140/90     Health Care Home     Lumbago     Cervicalgia     Cocaine abuse, episodic (H)     Suicidal ideation     Esophageal reflux     Mild nonproliferative diabetic retinopathy (H)     Tardive dyskinesia     Alcohol use     Left cataract     Falls frequently     History of uterine cancer     Psychophysiological insomnia     Dysuria     Asymptomatic postmenopausal status     Abdominal pain, right lower quadrant     Infectious encephalopathy     Non-intractable vomiting with nausea     Thoughts of self harm     Gastroenteritis     Posttraumatic stress disorder     Cervical cancer screening     Type 2 diabetes mellitus with stage 3 chronic kidney disease, with long-term current use of insulin (H)     Positive AIDA (antinuclear antibody)     Hyperglycemia     Pneumonia     Past Surgical History:   Procedure Laterality Date     C OOPHORECTORMY FOR RAHEL, W/BX  1983    UTERINE     CATARACT IOL, RT/LT Bilateral 2017     COLONOSCOPY N/A 3/16/2017    Procedure: COLONOSCOPY;  Surgeon: Traci Gonzalez MD;  Location:  GI     Coronary CTA  5/21/2014     HYSTERECTOMY  1983    uterine cancer yearly pap's per provider.     LAPAROSCOPIC CHOLECYSTECTOMY  2008     PHACOEMULSIFICATION CLEAR CORNEA WITH STANDARD INTRAOCULAR LENS IMPLANT Left 5/5/2017    Procedure: PHACOEMULSIFICATION CLEAR CORNEA WITH STANDARD INTRAOCULAR LENS IMPLANT;  LEFT EYE PHACOEMULSIFICATION CLEAR CORNEA WITH STANDARD INTRAOCULAR LENS IMPLANT ;  Surgeon: Tyra Diaz MD;  Location:  [facility]     PHACOEMULSIFICATION CLEAR CORNEA WITH STANDARD INTRAOCULAR LENS IMPLANT Right 6/30/2017    Procedure: PHACOEMULSIFICATION CLEAR CORNEA WITH STANDARD INTRAOCULAR LENS IMPLANT;  RIGHT EYE PHACOEMULSIFICATION CLEAR CORNEA WITH STANDARD INTRAOCULAR LENS IMPLANT;  Surgeon: Tyra Diaz MD;  Location:  [facility]     RELEASE TRIGGER FINGER  10/11/2012    Left thumb. Procedure: RELEASE TRIGGER FINGER;   LEFT THUMB TRIGGER RELEASE;  Surgeon: Tay Langley MD;  Location:  [facility]     RELEASE TRIGGER FINGER Right 2016    Procedure: RELEASE TRIGGER FINGER;  Surgeon: Albino Castañeda MD;  Location: [facility] OR       Social History     Tobacco Use     Smoking status: Never Smoker     Smokeless tobacco: Never Used   Substance Use Topics     Alcohol use: No     Frequency: Never     Comment: last month     Family History   Problem Relation Age of Onset     Cancer Mother         BLADDER     Respiratory Mother         COPD     Gastrointestinal Disease Mother         CIRRHOSIS OF LI BOLIVAR     Alcohol/Drug Mother      Diabetes Mother      Hypertension Mother      Lipids Mother      C.A.D. Mother      Glaucoma Mother      Alcohol/Drug Sister      Mental Illness Sister      Alcohol/Drug Sister      Psychotic Disorder Sister      Cancer Maternal Grandmother         UNKNOWN TYPE     Cancer Brother         COLON     Cancer - colorectal Brother         IN HIS LATE 30S     Alcohol/Drug Brother          OF HEROIN OVERDOSE AT AGE 22 YRS     Macular Degeneration No family hx of            Current Outpatient Medications   Medication Sig Dispense Refill     aspirin (ASA) 81 MG [facility] tablet TAKE 1 TABLET (81MG) BY MOUTH DAILY 90 tablet 0     atorvastatin (LIPITOR) 80 MG tablet TAKE 1 TABLET (80MG) BY MOUTH DAILY 30 tablet 11     benzonatate (TESSALON) 100 MG capsule Take 1 capsule (100 mg) by mouth 3 times daily as needed for cough 30 capsule 3     benztropine (COGENTIN) 0.5 MG tablet Take 2 tablets (1 mg) by mouth 2 times daily 120 tablet 3     blood glucose (NO BRAND SPECIFIED) test strip Use to test blood sugar  3 times daily or as directed. To accompany: Blood Glucose Monitor Brands: per insurance. 100 strip 0     calcium carbonate (OS-ALLEN) 1500 (600 Ca) MG tablet Take 3 tablets (1,800 mg) by mouth daily 180 tablet 3     cholecalciferol (VITAMIN D3) 88247 units (1250 mcg) capsule TAKE 1 CAPSULE (50,000 UNITS) MONTHLY 4 capsule 3      clonazePAM (KLONOPIN) 0.5 MG tablet Take 1 tablet (0.5 mg) by mouth At Bedtime 30 tablet 0     Continuous Blood Gluc Sensor (DEXCOM G5 MOB/G4 PLAT SENSOR) MISC 1 Device every 7 days       Continuous Blood Gluc Transmit (DEXCOM G5 MOBILE TRANSMITTER) MISC 1 Device every 3 months       dextromethorphan-guaiFENesin (MUCINEX DM)  MG 12 hr tablet Take 1 tablet by mouth every 12 hours 30 tablet 0     diphenhydrAMINE (BENADRYL) 25 MG tablet Take 1 tablet in am and 1 tablet at 2 pm. Until Rash resolves 90 tablet 0     gabapentin (NEURONTIN) 300 MG capsule Take 3 capsules (900 mg) by mouth 3 times daily 270 capsule 1     guaiFENesin (ROBITUSSIN) 100 MG/5ML liquid Take 10 mLs (200 mg) by mouth nightly as needed for cough 118 mL 0     ibuprofen (ADVIL/MOTRIN) 200 MG tablet Take 200 mg by mouth every 8 hours as needed for mild pain       insulin aspart (NOVOLOG FLEXPEN) 100 UNIT/ML pen Inject 20 Units Subcutaneous 3 times daily (with meals) Once daily, can add additional 5 units if BGs are >500mg/dL.  Hold Insulin if BG are<70. 15 mL 11     insulin glargine (LANTUS SOLOSTAR PEN) 100 UNIT/ML pen Inject 60 Units Subcutaneous At Bedtime 15 mL 11     insulin pen needle (NOVOFINE 30) 30G X 8 MM miscellaneous USE 4 DAILY OR AS DIRECTED 400 each 0     ipratropium - albuterol 0.5 mg/2.5 mg/3 mL (DUONEB) 0.5-2.5 (3) MG/3ML neb solution Take 1 vial (3 mLs) by nebulization every 6 hours as needed for shortness of breath / dyspnea or wheezing 30 vial 0     lidocaine (LIDODERM) 5 % patch Place 1 patch onto the skin every 24 hours 10 patch 0     losartan (COZAAR) 50 MG tablet Take 1 tablet (50 mg) by mouth daily 90 tablet 3     melatonin 5 MG CAPS Take 2 capsules by mouth At Bedtime 180 capsule 3     metoprolol succinate ER (TOPROL-XL) 25 MG 24 hr tablet Take 1 tablet (25 mg) by mouth daily 90 tablet 1     naltrexone (DEPADE/REVIA) 50 MG tablet Take 1/2 tablet once daily 1-2 hours prior to worst cravings for 1 week, then increase to 1  tablet daily as directed if tolerating 30 tablet 3     nystatin (MYCOSTATIN) 372196 UNIT/GM external cream Apply topically 2 times daily 30 g 3     nystatin (MYCOSTATIN) 077789 UNIT/GM external powder Apply topically once as needed for dry skin 60 g 3     order for DME Equipment being ordered: Depends. 30 each 1     order for DME Equipment being ordered: CPAP Supplies. 1 each 0     order for DME Equipment being ordered: Freestyle Gabby [place] 1 Device 0     paliperidone ER (INVEGA) 9 MG 24 hr tablet Take 1 tablet (9 mg) by mouth every morning 90 tablet 1     polyethylene glycol (MIRALAX) powder Take 17 g (1 capful) by mouth daily as needed for constipation 500 g 3     ranitidine (ZANTAC) 300 MG tablet TAKE 1 TABLET (300MG) BY MOUTH AT BEDTIME 90 tablet 0     semaglutide (OZEMPIC, 1 MG/DOSE,) 2 MG/1.5ML pen Inject 1 mg Subcutaneous every 7 days 6 mL 5     sertraline (ZOLOFT) 100 MG tablet Take 2 tablets (200 mg) by mouth daily 60 tablet 3     topiramate (TOPAMAX) 50 MG tablet Take 1.5 tablets (75 mg) by mouth 2 times daily 270 tablet 0     zinc oxide (DESITIN) 40 % external ointment Apply topically as needed for dry skin or irritation 56 g 0     Allergies   Allergen Reactions     Imidazole Antifungals Hives     Tolerates diflucan     Ketoprofen Itching     Pruritis to topical     Lisinopril Hives     Metformin Other (See Comments)     Patient hospitalized for lactic acidosis - admitting provider suspectd caused by metformin     Metronidazole Hives     Posaconazole Hives     Tolerates diflucan     Recent Labs   Lab Test 11/17/19  1355 10/05/19  0552  09/14/19  1915 08/06/19  1043  07/28/19  1141  05/24/19  0916  05/15/19  1822  12/20/18  1532  08/06/18  1038  06/27/17  1358   A1C  --   --   --   --  6.6*  --   --   --   --   --  7.6*  --  6.4*  --  6.4*   < > 7.0*   LDL  --   --   --   --  81  --   --   --   --   --   --   --   --   --  84  --  64   HDL  --   --   --   --  39*  --   --   --   --   --   --   --   --    --  46*  --  37*   TRIG  --   --   --   --  131  --   --   --   --   --   --   --   --   --  215*  --  267*   ALT 19  --   --  27  --   --  23   < > 30  --  33   < >  --    < >  --    < > 32   CR 1.02 0.98   < > 0.90  --    < > 1.13*   < > 0.87   < > 0.93   < > 0.99   < >  --    < > 0.78   GFRESTIMATED 60* 63   < > 70  --    < > 53*   < > 73   < > 67   < > 63   < >  --    < > 76   GFRESTBLACK 70 73   < > 81  --    < > 62   < > 85   < > 78   < > 73   < >  --    < > >90   GFR Calc     POTASSIUM 4.5 4.4   < > 4.7  --    < > 4.1   < > 4.0   < > 4.3   < > 4.2   < >  --    < > 4.3   TSH  --   --   --   --   --   --   --   --  1.62  --   --   --  1.98  --   --    < >  --     < > = values in this interval not displayed.      BP Readings from Last 3 Encounters:   11/17/19 129/71   11/04/19 127/71   11/01/19 104/72    Wt Readings from Last 3 Encounters:   11/04/19 109 kg (240 lb 4.8 oz)   11/01/19 110.7 kg (244 lb)   10/25/19 110.2 kg (243 lb)          Labs reviewed in EPIC  Problem list, Medication list, Allergies, and Medical/Social/Surgical histories reviewed in [facility] and updated as appropriate.      ROS: Constitutional, neuro, ENT, endocrine, pulmonary, cardiac, gastrointestinal, genitourinary, musculoskeletal, integument and psychiatric systems are negative, except as otherwise noted above in the HPI.       OBJECTIVE:                                                    /62   Pulse 93   Temp 97.4  F (36.3  C)   Resp 18   Wt 111.1 kg (245 lb)   LMP 01/06/2015   SpO2 93%   BMI 45.54 kg/m    Body mass index is 45.54 kg/m .    GENERAL: obese and fatigued  EYES: Eyes grossly normal to inspection, PERRL and conjunctivae and sclerae normal  RESP: lungs clear to auscultation - no rales, rhonchi or wheezes  CV: regular rates and rhythm, normal S1 S2, no S3 or S4 and no murmur, click or rub  MS: no gross musculoskeletal defects noted, no edema  NEURO: tremor to bilateral hands-occassionally and abnormal  "mental status - lethargic and sleepy.   PSYCH: concentration poor and inattentive    Mental Status Assessment:  Appearance:   Appropriate   Eye Contact:   Fair   Psychomotor Behavior: Normal , slow  Attitude:   Cooperative   Orientation:   All  Speech   Rate / Production: Normal    Volume:  Normal   Mood:    Normal  Affect:    Lethargic   Thought Content:  some confusion   Thought Form:  Coherent  Logical   Insight:    Fair   Attention Span and Concentration:  limited  Recent and Remote Memory:  intact  Fund of Knowledge: appropriate  Muscle Strength and Tone: normal   Suicidal Ideation: reports no thoughts, no intention    See [facility] notes     Diagnostic Test Results:  none      ASSESSMENT/PLAN:                                                    (G24.01) Tardive dyskinesia  (primary encounter diagnosis)  (R29.6) Falls frequently  Comment: Patient reports that she has been having some jerky movement of her hands and recently it has been harder for her to check her blood sugars or even hold something with one hand. Patient has been doing well but was seen in the ED a few days ago for a fall and also seems to be more lethargic and \"out of it\" according to her care provider. She was unable to hold a cup with one had during the visit and almost spilled her coffee from the tremors. Concerned about patient possible substance use, although she denies. Patient recently seen in the ED for a recent fall. thorough work-up was done including an EKG, Head CT, hip shoulders and X-rays which were all negative  Plan: Will have patient stop her naltrexone for now because this was the last medication that was started.   -Patient to check back in at 2 weeks. Continue with current plan with no changes.       (E11.22,  N18.3,  Z79.4) Type 2 diabetes mellitus with stage 3 chronic kidney disease, with long-term current use of insulin (H)  Comment: Uncontrolled diabetes. Reports fasting blood sugars 160-200's.   Plan: continue with current " insulin doses without any changes.       Patient Instructions   -Decrease naltrexone to half tablet for one week and then discontinue.   -Use your CPAP for the next 2 weeks as discussed.   -Check spontaneous blood sugars in the night at 2 am and 4 am; three times per week until follow-up appointment.  -Call clinic with any questions or medication.       I spent Greater than 40 minutes was spent face to face with Nena Tang, with greater than 50 % in counselling and consultation about Diabetes, TD and Falls.     ART Fay [facility] PRIMARY [facility]ARE

## 2019-11-21 NOTE — TELEPHONE ENCOUNTER
TOPIRAMATE 50MG TAB      Last Written Prescription Date:  8/28/19  Last Fill Quantity: 270,   # refills: 0  Last Office Visit : 11/4/19  Future Office visit:  2/6/20    Routing refill request to provider for review/approval because:  Drug not on the [facility], [facility] or [facility] refill protocol   90 day Rx pended which will get her to follow up in February

## 2019-11-21 NOTE — TELEPHONE ENCOUNTER
Prescription approved per [facility] Refill Protocol.  Aleida Lopez RN on 11/21/2019 at 2:08 PM

## 2019-11-25 RX ORDER — TOPIRAMATE 50 MG/1
75 TABLET, FILM COATED ORAL 2 TIMES DAILY
Qty: 270 TABLET | Refills: 0 | Status: SHIPPED | OUTPATIENT
Start: 2019-11-25 | End: 2019-12-03

## 2019-11-26 ENCOUNTER — NURSE TRIAGE (OUTPATIENT)
Dept: NURSING | Facility: CLINIC | Age: 59
End: 2019-11-26

## 2019-11-26 DIAGNOSIS — F25.0 SCHIZOAFFECTIVE DISORDER, BIPOLAR TYPE (H): ICD-10-CM

## 2019-11-26 NOTE — TELEPHONE ENCOUNTER
Requested Prescriptions   Pending Prescriptions Disp Refills     clonazePAM (KLONOPIN) 0.5 MG tablet 30 tablet 0     Sig: Take 1 tablet (0.5 mg) by mouth At Bedtime   Last Written Prescription Date:  11/15/2019  Last Fill Quantity: 30,  # refills: 0   Last office visit: 11/21/2019 with prescribing provider:     Future Office Visit:   Next 5 appointments (look out 90 days)    Dec 03, 2019 10:30 AM CST  Return Visit with ART Arias CNP  [facility] ([facility]) [address]  [address]  [facility] [address]  [phone]   Dec 03, 2019 10:30 AM CST  Office Visit with Eugenia De Jesus [facility] ([facility]) [address] [place] [address]  [address] [place] [id number] [phone]   Dec 03, 2019 10:30 AM CST  Return Visit with BIN Mondragon  [facility] ([facility]) [address] [place], [place] [address]  [address] [place] [id number] [phone]   Dec 04, 2019  2:00 PM CST  (Arrive by 1:45 PM)  [facility] [address] [place] [id number] [phone]   Dec 10, 2019 11:00 AM CST  Return Visit with ART Arias CNP  [facility] ([facility]) [address] [place] [address]  [address] [place] [id number] [phone]             There is no refill protocol information for this order

## 2019-11-27 ENCOUNTER — TELEPHONE (OUTPATIENT)
Dept: FAMILY MEDICINE | Facility: CLINIC | Age: 59
End: 2019-11-27

## 2019-11-27 RX ORDER — CLONAZEPAM 0.5 MG/1
0.5 TABLET ORAL AT BEDTIME
Qty: 30 TABLET | Refills: 0 | OUTPATIENT
Start: 2019-11-27

## 2019-11-27 NOTE — TELEPHONE ENCOUNTER
Reviewed with Gisselle, pharmacist response, no changes at this time & F/U with PCP 12/3/19.  Wendy Schwartz RN

## 2019-11-27 NOTE — TELEPHONE ENCOUNTER
Group home staff Alva reports pt had episode of low BG tonight. Takes Lantus 60 units at HS and Novolog 30 units three times daily with meals. Ozempic 1 mg subcutaneous q wk.   Tonight pt ate some dinner but little protein. Protein was provided but pt refused it. Pt took her Novolog 30 units w/ dinner.  About 7:30pm pt c/o feeling shaky and hypoglycemic. BG at that time was 49. Took 2 glucose tabs. 7 min later BG 63. Ate 2 slices bread + peanut butter. 14 min after initial BG the recheck was 96 and hypoglycemic sx gone. Now @8pm intermediate done w/ eggs, sausage, milk and orange juice. BG at time of call 173. Group home staff states diet compliance has been an issue before but typically pt does not have low BG episodes. Advised call Endocrine doctor tomorrow AM when clinic opens.     Reason for Disposition    Caller has NON-URGENT medication question about med that PCP prescribed and triager unable to answer question    Additional Information    Negative: Unconscious or difficult to awaken    Negative: Seizure occurs    Negative: Acting confused (e.g., disoriented, slurred speech)    Negative: Very weak (e.g., can't stand)    Negative: Sounds like a life-threatening emergency to the triager    Negative: [1] Vomiting AND [2] signs of dehydration (e.g., no urine > 12 hours, very dry mouth, dark urine, etc.)    Negative: [1] Low blood sugar symptoms persist > 30 minutes AND [2] using low blood sugar Care Advice    Negative: [1] Low blood glucose (persists > 30 minutes AND [2] using low blood sugar Care Advice    Negative: Patient sounds very sick or weak to the triager    Negative: [1] Low blood sugar symptoms with no other adult present AND [2] hasn't tried Care Advice    Negative: [1] Low blood glucose (< 70 mg/dl  or 3.9 mmol/l) with no other adult present AND [2] hasn't tried Care Advice    Negative: Diabetes drug error or overdose (e.g., insulin error or extra dose)    Negative: Caller has URGENT medication or insulin  pump question and triager unable to answer question    Negative: [1] Blood glucose < 70  mg/dl (3.9 mmol/l) or symptomatic, now improved with Care Advice AND [2] cause unknown    Negative: [1] Morning (before breakfast) blood glucose < 80 mg/dl (4.5 mmol/L) AND [2] more than once in past week    Negative: [1] Evening (after bedtime snack) blood glucose < 100 mg/dl (5.6 mmol/l) AND [2] more than once in past week    Protocols used: DIABETES - LOW BLOOD SUGAR-A-AH

## 2019-11-27 NOTE — TELEPHONE ENCOUNTER
Call form SHADIA Pitts [facility] BS at noon today is 100 & will hold the noon dose of the Novolog until she receives direction from provider,pt did eat well at lunch today, & within her diabetic diet Pt morning BS have been on the 49-70 range.  Pt is non adherent to diet most of the time. Staff would like directions on dose of bedtime Lantus,( see TE below from [facility])  Pt has appt 12/3/19 with PCP    Routing to AIDA Schwartz RN

## 2019-11-27 NOTE — TELEPHONE ENCOUNTER
Reason for Call:  Other call back    Detailed comments: Alva is the care coordinator of the patient and she would like to talk to a nurse regarding low blood sugar levels that the patient had yesterday.     Phone Number Patient can be reached at: Home number on file [phone] Alva    Best Time: Anytime     Can we leave a detailed message on this number? YES    Call taken on 11/27/2019 at 12:13 PM by Maria Elena Olsen

## 2019-11-27 NOTE — TELEPHONE ENCOUNTER
Reviewed chart. Last seen by PCP 1 week ago and BS reported as 150-200. Asked RN to call for BS from the past week and clarify dose of Novolog, since notes from nurse advisors read 30u - clarified she is taking 20 units of Novolog with meals.     Received fax of BS:  Date FBG/ 2hours post Lunch/2hours post Dinner /2hours post    11/27 140 81,100     11/26 122 292 127,48,63,96,173,218     99,113 273 115/115     167  160/192      214 144,296/350     114 232 199     165 127 214/173       Plan:  No dose changes needed.  Follow-up as scheduled.    Eugenia De Jesus, PharmD, BCACP

## 2019-12-03 ENCOUNTER — OFFICE VISIT (OUTPATIENT)
Dept: PHARMACY | Facility: CLINIC | Age: 59
End: 2019-12-03
Payer: COMMERCIAL

## 2019-12-03 ENCOUNTER — OFFICE VISIT (OUTPATIENT)
Dept: BEHAVIORAL HEALTH | Facility: CLINIC | Age: 59
End: 2019-12-03
Payer: MEDICARE

## 2019-12-03 ENCOUNTER — OFFICE VISIT (OUTPATIENT)
Dept: FAMILY MEDICINE | Facility: CLINIC | Age: 59
End: 2019-12-03
Payer: MEDICARE

## 2019-12-03 ENCOUNTER — MEDICAL CORRESPONDENCE (OUTPATIENT)
Dept: HEALTH INFORMATION MANAGEMENT | Facility: CLINIC | Age: 59
End: 2019-12-03

## 2019-12-03 VITALS
RESPIRATION RATE: 18 BRPM | BODY MASS INDEX: 45.18 KG/M2 | WEIGHT: 245.5 LBS | OXYGEN SATURATION: 98 % | HEART RATE: 82 BPM | SYSTOLIC BLOOD PRESSURE: 132 MMHG | DIASTOLIC BLOOD PRESSURE: 80 MMHG | HEIGHT: 62 IN | TEMPERATURE: 97.9 F

## 2019-12-03 DIAGNOSIS — G89.29 CHRONIC RIGHT-SIDED LOW BACK PAIN WITHOUT SCIATICA: ICD-10-CM

## 2019-12-03 DIAGNOSIS — R45.851 SUICIDAL IDEATION: ICD-10-CM

## 2019-12-03 DIAGNOSIS — F25.1 SCHIZOAFFECTIVE DISORDER, DEPRESSIVE TYPE (H): Primary | ICD-10-CM

## 2019-12-03 DIAGNOSIS — E11.3299 TYPE 2 DIABETES MELLITUS WITH MILD NONPROLIFERATIVE RETINOPATHY WITHOUT MACULAR EDEMA, WITH LONG-TERM CURRENT USE OF INSULIN, UNSPECIFIED LATERALITY (H): Primary | ICD-10-CM

## 2019-12-03 DIAGNOSIS — Z79.4 TYPE 2 DIABETES MELLITUS WITH HYPERGLYCEMIA, WITH LONG-TERM CURRENT USE OF INSULIN (H): ICD-10-CM

## 2019-12-03 DIAGNOSIS — G89.29 CHRONIC PAIN OF BOTH SHOULDERS: ICD-10-CM

## 2019-12-03 DIAGNOSIS — Z79.4 TYPE 2 DIABETES MELLITUS WITH MILD NONPROLIFERATIVE RETINOPATHY WITHOUT MACULAR EDEMA, WITH LONG-TERM CURRENT USE OF INSULIN, UNSPECIFIED LATERALITY (H): Primary | ICD-10-CM

## 2019-12-03 DIAGNOSIS — M25.511 CHRONIC PAIN OF BOTH SHOULDERS: ICD-10-CM

## 2019-12-03 DIAGNOSIS — R45.89 THOUGHTS OF SELF HARM: ICD-10-CM

## 2019-12-03 DIAGNOSIS — E11.65 TYPE 2 DIABETES MELLITUS WITH HYPERGLYCEMIA, WITH LONG-TERM CURRENT USE OF INSULIN (H): ICD-10-CM

## 2019-12-03 DIAGNOSIS — E66.9 OBESITY, UNSPECIFIED OBESITY SEVERITY, UNSPECIFIED OBESITY TYPE: ICD-10-CM

## 2019-12-03 DIAGNOSIS — F43.10 POSTTRAUMATIC STRESS DISORDER: ICD-10-CM

## 2019-12-03 DIAGNOSIS — G47.33 OSA (OBSTRUCTIVE SLEEP APNEA): ICD-10-CM

## 2019-12-03 DIAGNOSIS — K21.9 GASTROESOPHAGEAL REFLUX DISEASE WITHOUT ESOPHAGITIS: ICD-10-CM

## 2019-12-03 DIAGNOSIS — M25.512 CHRONIC PAIN OF BOTH SHOULDERS: ICD-10-CM

## 2019-12-03 DIAGNOSIS — M54.2 CERVICALGIA: ICD-10-CM

## 2019-12-03 DIAGNOSIS — M54.50 CHRONIC RIGHT-SIDED LOW BACK PAIN WITHOUT SCIATICA: ICD-10-CM

## 2019-12-03 DIAGNOSIS — E66.01 MORBID OBESITY (H): ICD-10-CM

## 2019-12-03 LAB
ALBUMIN SERPL-MCNC: 3.4 G/DL (ref 3.4–5)
ALP SERPL-CCNC: 90 U/L (ref 40–150)
ALT SERPL W P-5'-P-CCNC: 26 U/L (ref 0–50)
AMPHETAMINES UR QL: NOT DETECTED NG/ML
ANION GAP SERPL CALCULATED.3IONS-SCNC: 8 MMOL/L (ref 3–14)
AST SERPL W P-5'-P-CCNC: 15 U/L (ref 0–45)
BARBITURATES UR QL SCN: NOT DETECTED NG/ML
BENZODIAZ UR QL SCN: NOT DETECTED NG/ML
BILIRUB SERPL-MCNC: 0.2 MG/DL (ref 0.2–1.3)
BUN SERPL-MCNC: 16 MG/DL (ref 7–30)
BUPRENORPHINE UR QL: NOT DETECTED NG/ML
CALCIUM SERPL-MCNC: 8.9 MG/DL (ref 8.5–10.1)
CANNABINOIDS UR QL: NOT DETECTED NG/ML
CHLORIDE SERPL-SCNC: 110 MMOL/L (ref 94–109)
CO2 SERPL-SCNC: 21 MMOL/L (ref 20–32)
COCAINE UR QL SCN: NOT DETECTED NG/ML
CREAT SERPL-MCNC: 0.91 MG/DL (ref 0.52–1.04)
D-METHAMPHET UR QL: NOT DETECTED NG/ML
GFR SERPL CREATININE-BSD FRML MDRD: 69 ML/MIN/{1.73_M2}
GLUCOSE SERPL-MCNC: 146 MG/DL (ref 70–99)
HBA1C MFR BLD: 6.2 % (ref 0–5.6)
METHADONE UR QL SCN: NOT DETECTED NG/ML
OPIATES UR QL SCN: NOT DETECTED NG/ML
OXYCODONE UR QL SCN: NOT DETECTED NG/ML
PCP UR QL SCN: NOT DETECTED NG/ML
POTASSIUM SERPL-SCNC: 4.1 MMOL/L (ref 3.4–5.3)
PROPOXYPH UR QL: NOT DETECTED NG/ML
PROT SERPL-MCNC: 7.7 G/DL (ref 6.8–8.8)
SODIUM SERPL-SCNC: 139 MMOL/L (ref 133–144)
TRICYCLICS UR QL SCN: NOT DETECTED NG/ML

## 2019-12-03 PROCEDURE — 90837 PSYTX W PT 60 MINUTES: CPT | Performed by: SOCIAL WORKER

## 2019-12-03 PROCEDURE — 36415 COLL VENOUS BLD VENIPUNCTURE: CPT | Performed by: NURSE PRACTITIONER

## 2019-12-03 PROCEDURE — 83036 HEMOGLOBIN GLYCOSYLATED A1C: CPT | Performed by: NURSE PRACTITIONER

## 2019-12-03 PROCEDURE — 80306 DRUG TEST PRSMV INSTRMNT: CPT | Performed by: NURSE PRACTITIONER

## 2019-12-03 PROCEDURE — 99607 MTMS BY PHARM ADDL 15 MIN: CPT | Performed by: PHARMACIST

## 2019-12-03 PROCEDURE — 90785 PSYTX COMPLEX INTERACTIVE: CPT | Performed by: SOCIAL WORKER

## 2019-12-03 PROCEDURE — 99215 OFFICE O/P EST HI 40 MIN: CPT | Performed by: NURSE PRACTITIONER

## 2019-12-03 PROCEDURE — 99606 MTMS BY PHARM EST 15 MIN: CPT | Performed by: PHARMACIST

## 2019-12-03 PROCEDURE — 80053 COMPREHEN METABOLIC PANEL: CPT | Performed by: NURSE PRACTITIONER

## 2019-12-03 RX ORDER — GABAPENTIN 300 MG/1
600 CAPSULE ORAL 3 TIMES DAILY
Qty: 270 CAPSULE | Refills: 1 | Status: ON HOLD | OUTPATIENT
Start: 2019-12-03 | End: 2019-12-31

## 2019-12-03 RX ORDER — TOPIRAMATE 50 MG/1
50 TABLET, FILM COATED ORAL 2 TIMES DAILY
Qty: 270 TABLET | Refills: 0 | Status: ON HOLD | OUTPATIENT
Start: 2019-12-03 | End: 2019-12-31

## 2019-12-03 ASSESSMENT — MIFFLIN-ST. JEOR: SCORE: 1638.89

## 2019-12-03 NOTE — PROGRESS NOTES
SUBJECTIVE:                                                    Nena Tang is a 59 year old female who presents to clinic today for the following health issues:  [facility]: [place]  MTM: Eugenia    Patient again presents with lethargy and sedative look again. Patient denies any substance use or abuse. Group home is also reporting changes in her mentation and how she can independently take care of her diabetes. Stopped naltrexone at the last visit. Will plan to make some medication changes today and reassess in 2 weeks.     Diabetes Follow-up  Reports that she does not remember that she gave herself 2 doses of novolog and blood sugars dropped to 48, was given a snack and sugar pills to get it back up. Patient states that he   How often are you checking your blood sugar? Four or more times daily  Blood sugar testing frequency justification:  N.A  What time of day are you checking your blood sugars (select all that apply)?  Before and after meals and At bedtime  Have you had any blood sugars above 200?  Yes    Have you had any blood sugars below 70?  Yes  - 145    What symptoms do you notice when your blood sugar is low?  Shaky, Dizzy, Weak, Blurred vision and Confusion    What concerns do you have today about your diabetes? None and Blood sugar is often over 200     Do you have any of these symptoms? (Select all that apply)  Numbness in feet     Have you had a diabetic eye exam in the last 12 months? No    BP Readings from Last 2 Encounters:   11/21/19 118/62   11/17/19 129/71     Hemoglobin A1C (%)   Date Value   08/06/2019 6.6 (H)   05/15/2019 7.6 (H)     LDL Cholesterol Calculated (mg/dL)   Date Value   08/06/2019 81   08/06/2018 84       Mental Health Follow up: Schizophrenia.    Patient feels like she has been having deep dreams- stressful dreams. States that she is not having any hallucinations. Feels like she is just stubborn and not using her CPAP like she should. Patient reports that her mood has been down with  the holiday season.   Reports having suicidal thoughts about 3 weeks. Wanted to harm herself when she took too much insulin. Patient was contracted to safety with stating that she will let one of the workers know when she is having thoughts about wanting to harm herself.     Status since last visit: on and off, trying to maintain     See PHQ-9 for current symptoms.  Other associated symptoms: None    Complicating factors: Yes - lifestyle stress   Significant life event:  No   Current substance abuse:  None  Anxiety or Panic symptoms:  No    Sleep - stopped using her CPAP just because she did not feel like it.  Plant to start using it this coming Friday.   Appetite - trying to eat less, not going to restaurants as much as she was in the past.     Smoking - denies.   Alcohol - denies.   Street drugs - denies.   Marijuana - denies.     PHQ-9  English PHQ-9   Any Language               Problems taking medications regularly: No    Medication side effects: none    Diet: regular (no restrictions)    Social History     Tobacco Use     Smoking status: Never Smoker     Smokeless tobacco: Never Used   Substance Use Topics     Alcohol use: No     Frequency: Never     Comment: last month        Problem list and histories reviewed & adjusted, as indicated.  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 (H)     Illiterate     Irritable bowel syndrome     overweight - BMI >35     Takotsubo cardiomyopathy     CAD (coronary artery disease)     Restless legs syndrome (RLS)     CINDY (obstructive sleep apnea)- mild AHI 10.3     Verbal auditory hallucination     Chronic low back pain     Schizoaffective disorder, depressive type (H)     Migraine headache     HTN, goal below 140/90     Health Care [facility]      Cervicalgia     Cocaine abuse, episodic (H)     Suicidal ideation     Esophageal reflux     Mild nonproliferative diabetic retinopathy (H)     Tardive dyskinesia     Alcohol use     Left cataract     Falls frequently     History of uterine cancer     Psychophysiological insomnia     Dysuria     Asymptomatic postmenopausal status     Abdominal pain, right lower quadrant     Infectious encephalopathy     Non-intractable vomiting with nausea     Thoughts of self harm     Gastroenteritis     Posttraumatic stress disorder     Cervical cancer screening     Type 2 diabetes mellitus with stage 3 chronic kidney disease, with long-term current use of insulin (H)     Positive AIDA (antinuclear antibody)     Hyperglycemia     Pneumonia     Past Surgical History:   Procedure Laterality Date     C OOPHORECTORMY FOR MALIG, W/BX  1983    UTERINE     CATARACT IOL, RT/LT Bilateral 2017     COLONOSCOPY N/A 3/16/2017    Procedure: COLONOSCOPY;  Surgeon: Traci Gonzalez MD;  Location:  GI     Coronary CTA  5/21/2014     HYSTERECTOMY  1983    uterine cancer yearly pap's per provider.     LAPAROSCOPIC CHOLECYSTECTOMY  2008     PHACOEMULSIFICATION CLEAR CORNEA WITH STANDARD INTRAOCULAR LENS IMPLANT Left 5/5/2017    Procedure: PHACOEMULSIFICATION CLEAR CORNEA WITH STANDARD INTRAOCULAR LENS IMPLANT;  LEFT EYE PHACOEMULSIFICATION CLEAR CORNEA WITH STANDARD INTRAOCULAR LENS IMPLANT ;  Surgeon: Tyra Diaz MD;  Location:  [facility]     PHACOEMULSIFICATION CLEAR CORNEA WITH STANDARD INTRAOCULAR LENS IMPLANT Right 6/30/2017    Procedure: PHACOEMULSIFICATION CLEAR CORNEA WITH STANDARD INTRAOCULAR LENS IMPLANT;  RIGHT EYE PHACOEMULSIFICATION CLEAR CORNEA WITH STANDARD INTRAOCULAR LENS IMPLANT;  Surgeon: Tyra Diaz MD;  Location:  [facility]     RELEASE TRIGGER FINGER  10/11/2012    Left thumb. Procedure: RELEASE TRIGGER FINGER;  LEFT THUMB TRIGGER RELEASE;  Surgeon: Tay Langley MD;  Location:  [facility]     RELEASE TRIGGER FINGER  Right 2016    Procedure: RELEASE TRIGGER FINGER;  Surgeon: Albino Castañeda MD;  Location: [facility] OR       Social History     Tobacco Use     Smoking status: Never Smoker     Smokeless tobacco: Never Used   Substance Use Topics     Alcohol use: No     Frequency: Never     Comment: last month     Family History   Problem Relation Age of Onset     Cancer Mother         BLADDER     Respiratory Mother         COPD     Gastrointestinal Disease Mother         CIRRHOSIS OF LI BOLIVAR     Alcohol/Drug Mother      Diabetes Mother      Hypertension Mother      Lipids Mother      C.A.D. Mother      Glaucoma Mother      Alcohol/Drug Sister      Mental Illness Sister      Alcohol/Drug Sister      Psychotic Disorder Sister      Cancer Maternal Grandmother         UNKNOWN TYPE     Cancer Brother         COLON     Cancer - colorectal Brother         IN HIS LATE 30S     Alcohol/Drug Brother          OF HEROIN OVERDOSE AT AGE 22 YRS     Macular Degeneration No family hx of            Current Outpatient Medications   Medication Sig Dispense Refill     aspirin (ASA) 81 MG [facility] tablet TAKE 1 TABLET (81MG) BY MOUTH DAILY 90 tablet 0     atorvastatin (LIPITOR) 80 MG tablet TAKE 1 TABLET (80MG) BY MOUTH DAILY 30 tablet 11     benzonatate (TESSALON) 100 MG capsule Take 1 capsule (100 mg) by mouth 3 times daily as needed for cough 30 capsule 3     benztropine (COGENTIN) 0.5 MG tablet Take 2 tablets (1 mg) by mouth 2 times daily 120 tablet 3     blood glucose (NO BRAND SPECIFIED) test strip Use to test blood sugar  3 times daily or as directed. To accompany: Blood Glucose Monitor Brands: per insurance. 100 strip 0     calcium carbonate (OS-ALLEN) 1500 (600 Ca) MG tablet Take 3 tablets (1,800 mg) by mouth daily 180 tablet 3     cholecalciferol (VITAMIN D3) 41012 units (1250 mcg) capsule TAKE 1 CAPSULE (50,000 UNITS) MONTHLY 4 capsule 3     clonazePAM (KLONOPIN) 0.5 MG tablet Take 1 tablet (0.5 mg) by mouth At Bedtime 30 tablet 0      Continuous Blood Gluc Sensor (DEXCOM G5 MOB/G4 PLAT SENSOR) MISC 1 Device every 7 days       Continuous Blood Gluc Transmit (DEXCOM G5 MOBILE TRANSMITTER) MISC 1 Device every 3 months       dextromethorphan-guaiFENesin (MUCINEX DM)  MG 12 hr tablet Take 1 tablet by mouth every 12 hours 30 tablet 0     diphenhydrAMINE (BENADRYL) 25 MG tablet Take 1 tablet in am and 1 tablet at 2 pm. Until Rash resolves 90 tablet 0     gabapentin (NEURONTIN) 300 MG capsule Take 3 capsules (900 mg) by mouth 3 times daily 270 capsule 1     guaiFENesin (ROBITUSSIN) 100 MG/5ML liquid Take 10 mLs (200 mg) by mouth nightly as needed for cough 118 mL 0     ibuprofen (ADVIL/MOTRIN) 200 MG tablet Take 200 mg by mouth every 8 hours as needed for mild pain       insulin aspart (NOVOLOG FLEXPEN) 100 UNIT/ML pen Inject 20 Units Subcutaneous 3 times daily (with meals) Once daily, can add additional 5 units if BGs are >500mg/dL.  Hold Insulin if BG are<70. 15 mL 11     insulin glargine (LANTUS SOLOSTAR PEN) 100 UNIT/ML pen Inject 60 Units Subcutaneous At Bedtime 15 mL 11     insulin pen needle (NOVOFINE 30) 30G X 8 MM miscellaneous USE 4 DAILY OR AS DIRECTED 400 each 0     ipratropium - albuterol 0.5 mg/2.5 mg/3 mL (DUONEB) 0.5-2.5 (3) MG/3ML neb solution Take 1 vial (3 mLs) by nebulization every 6 hours as needed for shortness of breath / dyspnea or wheezing 30 vial 0     lidocaine (LIDODERM) 5 % patch Place 1 patch onto the skin every 24 hours 10 patch 0     losartan (COZAAR) 50 MG tablet Take 1 tablet (50 mg) by mouth daily 90 tablet 3     melatonin 5 MG CAPS Take 2 capsules by mouth At Bedtime 180 capsule 3     metoprolol succinate ER (TOPROL-XL) 25 MG 24 hr tablet Take 1 tablet (25 mg) by mouth daily 90 tablet 1     naltrexone (DEPADE/REVIA) 50 MG tablet Take 1/2 tablet once daily 1-2 hours prior to worst cravings for 1 week, then increase to 1 tablet daily as directed if tolerating 30 tablet 3     nystatin (MYCOSTATIN) 556416 UNIT/GM  external cream Apply topically 2 times daily 30 g 3     nystatin (MYCOSTATIN) 961684 UNIT/GM external powder Apply topically once as needed for dry skin 60 g 3     order for DME Equipment being ordered: Depends. 30 each 1     order for DME Equipment being ordered: CPAP Supplies. 1 each 0     order for DME Equipment being ordered: Freestyle [place] 1 Device 0     paliperidone ER (INVEGA) 9 MG 24 hr tablet Take 1 tablet (9 mg) by mouth every morning 90 tablet 1     polyethylene glycol (MIRALAX) powder Take 17 g (1 capful) by mouth daily as needed for constipation 500 g 3     ranitidine (ZANTAC) 300 MG tablet TAKE 1 TABLET (300MG) BY MOUTH AT BEDTIME 90 tablet 0     semaglutide (OZEMPIC, 1 MG/DOSE,) 2 MG/1.5ML pen Inject 1 mg Subcutaneous every 7 days 6 mL 5     sertraline (ZOLOFT) 100 MG tablet Take 2 tablets (200 mg) by mouth daily 60 tablet 3     topiramate (TOPAMAX) 50 MG tablet Take 1.5 tablets (75 mg) by mouth 2 times daily 270 tablet 0     zinc oxide (DESITIN) 40 % external ointment Apply topically as needed for dry skin or irritation 56 g 0     Allergies   Allergen Reactions     Imidazole Antifungals Hives     Tolerates diflucan     Ketoprofen Itching     Pruritis to topical     Lisinopril Hives     Metformin Other (See Comments)     Patient hospitalized for lactic acidosis - admitting provider suspectd caused by metformin     Metronidazole Hives     Posaconazole Hives     Tolerates diflucan     Recent Labs   Lab Test 11/17/19  1355 10/05/19  0552  09/14/19  1915 08/06/19  1043  07/28/19  1141  05/24/19  0916  05/15/19  1822  12/20/18  1532  08/06/18  1038  06/27/17  1358   A1C  --   --   --   --  6.6*  --   --   --   --   --  7.6*  --  6.4*  --  6.4*   < > 7.0*   LDL  --   --   --   --  81  --   --   --   --   --   --   --   --   --  84  --  64   HDL  --   --   --   --  39*  --   --   --   --   --   --   --   --   --  46*  --  37*   TRIG  --   --   --   --  131  --   --   --   --   --   --   --   --   --   "215*  --  267*   ALT 19  --   --  27  --   --  23   < > 30  --  33   < >  --    < >  --    < > 32   CR 1.02 0.98   < > 0.90  --    < > 1.13*   < > 0.87   < > 0.93   < > 0.99   < >  --    < > 0.78   GFRESTIMATED 60* 63   < > 70  --    < > 53*   < > 73   < > 67   < > 63   < >  --    < > 76   GFRESTBLACK 70 73   < > 81  --    < > 62   < > 85   < > 78   < > 73   < >  --    < > >90   GFR Calc     POTASSIUM 4.5 4.4   < > 4.7  --    < > 4.1   < > 4.0   < > 4.3   < > 4.2   < >  --    < > 4.3   TSH  --   --   --   --   --   --   --   --  1.62  --   --   --  1.98  --   --    < >  --     < > = values in this interval not displayed.      BP Readings from Last 3 Encounters:   11/21/19 118/62   11/17/19 129/71   11/04/19 127/71    Wt Readings from Last 3 Encounters:   11/21/19 111.1 kg (245 lb)   11/04/19 109 kg (240 lb 4.8 oz)   11/01/19 110.7 kg (244 lb)          Labs reviewed in EPIC  Problem list, Medication list, Allergies, and Medical/Social/Surgical histories reviewed in [facility] and updated as appropriate.    ROS: Constitutional, neuro, ENT, endocrine, pulmonary, cardiac, gastrointestinal, genitourinary, musculoskeletal, integument and psychiatric systems are negative, except as otherwise noted above in the HPI.  OBJECTIVE:                                                    /80   Pulse 82   Temp 97.9  F (36.6  C) (Temporal)   Resp 18   Ht 1.562 m (5' 1.5\")   Wt 111.4 kg (245 lb 8 oz)   LMP 01/06/2015   SpO2 98%   Breastfeeding No   BMI 45.64 kg/m    Body mass index is 45.64 kg/m .    GENERAL: no distress, obese and fatigued  EYES: Eyes grossly normal to inspection, PERRL and conjunctivae and sclerae normal  RESP: lungs clear to auscultation - no rales, rhonchi or wheezes  CV: regular rates and rhythm, normal S1 S2, no S3 or S4 and no murmur, click or rub  ABDOMEN: soft, nontender and bowel sounds normal  MS: no gross musculoskeletal defects noted, no edema  NEURO: Normal strength and tone, " sensory exam grossly normal and mentation intact    Mental Status Assessment:  Appearance:   Appropriate   Eye Contact:   Fair   Psychomotor Behavior: Normal   Attitude:   Cooperative  Guarded   Orientation:   All  Speech   Rate / Production: Normal  Slow    Volume:  Normal   Mood:    Depressed  Sad   Affect:    Lethargic  Subdued   Thought Content:  Clear   Thought Form:  Coherent  Logical   Insight:    Fair   Attention Span and Concentration:  limited  Recent and Remote Memory:  intact  Fund of Knowledge: appropriate  Muscle Strength and Tone: normal   Suicidal Ideation: reports no thoughts, no intention today.   Hallucination: Yes  Paranoid-No  Manic-No  Panic-No  Self harm-No    See [facility] notes    Diagnostic Test Results:  Results for orders placed or performed in visit on 12/03/19   Hemoglobin A1c     Status: Abnormal   Result Value Ref Range    Hemoglobin A1C 6.2 (H) 0 - 5.6 %   Drug Abuse Screen Panel 13, Urine (Pain Care Package)     Status: None   Result Value Ref Range    Cannabinoids (93-lwo-4-carboxy-9-THC) Not Detected NDET^Not Detected ng/mL    Phencyclidine (Phencyclidine) Not Detected NDET^Not Detected ng/mL    Cocaine (Benzoylecgonine) Not Detected NDET^Not Detected ng/mL    Methamphetamine (d-Methamphetamine) Not Detected NDET^Not Detected ng/mL    Opiates (Morphine) Not Detected NDET^Not Detected ng/mL    Amphetamine (d-Amphetamine) Not Detected NDET^Not Detected ng/mL    Benzodiazepines (Nordiazepam) Not Detected NDET^Not Detected ng/mL    Tricyclic Antidepressants (Desipramine) Not Detected NDET^Not Detected ng/mL    Methadone (Methadone) Not Detected NDET^Not Detected ng/mL    Barbiturates (Butalbital) Not Detected NDET^Not Detected ng/mL    Oxycodone (Oxycodone) Not Detected NDET^Not Detected ng/mL    Propoxyphene (Norpropoxyphene) Not Detected NDET^Not Detected ng/mL    Buprenorphine (Buprenorphine) Not Detected NDET^Not Detected ng/mL   Comprehensive metabolic panel (BMP + Alb, Alk Phos, ALT,  AST, Total. Bili, TP)     Status: Abnormal   Result Value Ref Range    Sodium 139 133 - 144 mmol/L    Potassium 4.1 3.4 - 5.3 mmol/L    Chloride 110 (H) 94 - 109 mmol/L    Carbon Dioxide 21 20 - 32 mmol/L    Anion Gap 8 3 - 14 mmol/L    Glucose 146 (H) 70 - 99 mg/dL    Urea Nitrogen 16 7 - 30 mg/dL    Creatinine 0.91 0.52 - 1.04 mg/dL    GFR Estimate 69 >60 mL/min/[1.73_m2]    GFR Estimate If Black 80 >60 mL/min/[1.73_m2]    Calcium 8.9 8.5 - 10.1 mg/dL    Bilirubin Total 0.2 0.2 - 1.3 mg/dL    Albumin 3.4 3.4 - 5.0 g/dL    Protein Total 7.7 6.8 - 8.8 g/dL    Alkaline Phosphatase 90 40 - 150 U/L    ALT 26 0 - 50 U/L    AST 15 0 - 45 U/L        ASSESSMENT/PLAN:                                                    (E11.3299,  Z79.4) Type 2 diabetes mellitus with mild nonproliferative retinopathy without macular edema, with long-term current use of insulin, unspecified laterality (H)  (primary encounter diagnosis)  Comment: Patient provided a log of her reported blood sugar- see media section. Patient denies any side effects with current medications. Patient has a couple of incidences where the staff in the group home were concerned about the ability of patient being independent. Patient denies any substance use.    Plan: Hemoglobin A1c, Comprehensive metabolic panel         (BMP + Alb, Alk Phos, ALT, AST, Total. Bili,         TP)        -Will call patient with results.     (R45.851) Suicidal ideation  (R45.89) Thoughts of self harm  Comment: Patient reports that the last time she had thoughts of wanting to hurt herself was 3 weeks ago. Patient denies any of those thoughts today.   Plan: Patient made contract to let her group home caregivers know when she has these thoughts.     (M54.5,  G89.29) Chronic right-sided low back pain without sciatica  (M54.2) Cervicalgia  Comment: Patient states that her pain has been stable. Able to walk around at the drop in center and this has been helpful.   Plan: Drug Abuse Screen Panel  13, Urine (Pain Care         Package); gabapentin (NEURONTIN) 300 MG capsule        -Will decrease the gabapentin dose and plan to titrate to off. Patient is also ok with having a urine tox screen done to confirm that she is not using any substance.     (E66.01) Morbid obesity (H)  Comment: Body mass index is 45.64 kg/m .  Wt Readings from Last 4 Encounters:   12/03/19 111.4 kg (245 lb 8 oz)   11/21/19 111.1 kg (245 lb)   11/04/19 109 kg (240 lb 4.8 oz)   11/01/19 110.7 kg (244 lb)     Plan: topiramate (TOPAMAX) 50 MG tablet,         Comprehensive metabolic panel (BMP + Alb, Alk         Phos, ALT, AST, Total. Bili, TP)  - Continue to be off naltrexone for now. Follow-up in 2 weeks for reassessment.       Patient Instructions   -Labs today, will call with the results.   -Decrease Gabapentin to 600 mg three times per day.   -Continue to stay off the naltrexone for now.   -Decrease Topamax to 50 mg twice per day.   -Call clinic with any questions or concerns.     I spent Greater than 40 minutes was spent face to face with Nena Tang, with greater than 50 % in counselling and consultation about Diabetes, Suicidal thoughts, Chronic pain and Obesity.       [facility] PRIMARY [facility]ARE

## 2019-12-03 NOTE — PROGRESS NOTES
[facility]   December 3, 2019  Behavioral Health Clinician Progress Note    Patient Name: Nena Tang           Service Type:  Individual      Service Location:   Face to Face in Clinic     Session Start Time: 10:37am  Session End Time: 11:40am      Session Length: 53 - 60      Attendees: Patient    Visit Activities (Refresh list every visit): [facility]     Diagnostic Assessment Date: 1-23-18  CGI date completed: 11-  Treatment Plan Review Date: 12-  See Flowsheets for today's PHQ-9 and TAMIA-7 results  Previous PHQ-9:   PHQ-9 SCORE 3/13/2018 10/26/2018 5/7/2019   PHQ-9 Total Score - - -   PHQ-9 Total Score - - -   PHQ-9 Total Score 14 20 22     Previous TAMIA-7:   TAMIA-7 SCORE 2/3/2017 3/13/2018 10/26/2018   Total Score - - -   Total Score 0 17 19   Total Score - - -       ALEXANDRA LEVEL:  ALEXANDRA Score (Last Two) 8/30/2011 6/9/2014   ALEXANDRA Raw Score 42 37   Activation Score 66 49.9   ALEXANDRA Level 3 2       DATA  Extended Session (60+ minutes): PROLONGED SERVICE IN THE OUTPATIENT SETTING REQUIRING DIRECT (FACE-TO-FACE) PATIENT CONTACT BEYOND THE USUAL SERVICE:    - Patient's presenting concerns require more intensive intervention than could be completed within the usual service  Interactive Complexity: Yes, visit entailed Interactive Complexity evidenced by:  -The need to manage maladaptive communication (related to, e.g., high anxiety, high reactivity, repeated questions, or disagreement) among participants that complicates delivery of care  Crisis: No  [facility] Patient: No    Treatment Objective(s) Addressed in This Session:  Target Behavior(s): disease management/lifestyle changes mental health    Depressed Mood: Increase interest, engagement, and pleasure in doing things  Decrease frequency and intensity of feeling down, depressed, hopeless  Improve quantity and quality of night time sleep / decrease daytime naps  Identify negative self-talk and behaviors: challenge core beliefs,  myths, and actions  Improve concentration, focus, and mindfulness in daily activities   Anxiety: will experience a reduction in anxiety and will increase ability to function adaptively  Functional Impairment: will effectively address problems that interfere with adaptive functioning  Alcohol / Substance Use: continue to make healthy choices regarding substance use and engage in activities / supportive services that promote sobriety    Current Stressors / Issues:    The patient was seen by [facility] [place] per the request of the PCP-she stated that she stopped using her CPAP-she was able to to talk about the reason why she should use the CPAP machine-she has been having some stressful dream-she has a new CPAP machine and this machine is better to use-no man there at my rest bothering-she stated that she will start using the CPAP this Friday as she is going to get some clear water for the machine-regarding appetite she stated that she has been focused on eating less food-the PCP looked into an episode of low blood sugars and the result was that she had double dosage-she talked about having suicidal thoughts and miss taking her medications then she talked about her blood sugars being high and that she wanted to take them down-she stated that she has been feeling down-we talked about finding things to do to lift her mood-she has been spending time with her best friend-the patient reported no chemical use and no current thoughts of wanting to harm the self-she stated that she would let her house mother know if she was having any serious safety issues-she stated that she was trying to hurt the self when she took too much insulin-The patient was able to contract for safety with stating that she would inform her staff if she was having unsafe thoughts.     This writer called the staff member at the living placement to inform them of the patient safety issues communication and the contract for safety to inform the staff if she is feeling  unsafe.      Progress on Treatment Objective(s) / Homework:  Worsening - PREPARATION (Decided to change - considering how); Intervened by negotiating a change plan and determining options / strategies for behavior change, identifying triggers, exploring social supports, and working towards setting a date to begin behavior change    Motivational Interviewing    MI Intervention: Expressed Empathy/Understanding, Supported Autonomy, Collaboration, Evocation, Permission to raise concern or advise, Open-ended questions, Reflections: simple and complex, Rolled with resistance: Emphasized patient autonomy, Simple reflection and Evoked patient agenda and Change talk (evoked)     Change Talk Expressed by the Patient: Desire to change Reasons to change Need to change Activation Taking steps    Provider Response to Change Talk: E - Evoked more info from patient about behavior change, A - Affirmed patient's thoughts, decisions, or attempts at behavior change, R - Reflected patient's change talk and S - Summarized patient's change talk statements      Care Plan review completed: No    Medication Review:  No changes to current psychiatric medication(s)     Medication Compliance:  NA    Changes in Health Issues:   None reported     Chemical Use Review:   Substance Use: Chemical use reviewed, no active concerns identified      Tobacco Use: No current tobacco use.      Assessment: Current Emotional / Mental Status (status of significant symptoms):  Risk status (Self / Other harm or suicidal ideation)  Patient has had a history of suicidal ideation: yes  Patient denies current fears or concerns for personal safety.  Patient denies current or recent suicidal ideation or behaviors.  Patient denies current or recent homicidal ideation or behaviors.  Patient denies current or recent self injurious behavior or ideation.  Patient denies other safety concerns.  A safety and risk management plan has not been developed at this time, however  patient was encouraged to call [facility] should there be a change in any of these risk factors.    Appearance:   Appropriate   Eye Contact:   Good   Psychomotor Behavior: Normal   Attitude:   Cooperative   Orientation:   All  Speech   Rate / Production: Normal    Volume:  Normal   Mood:    Anxious  Depressed  Irritable  Sad   Affect:    Appropriate  Worrisome   Thought Content:  Clear   Thought Form:  Coherent  Goal Directed   Insight:    Fair     Diagnoses:  1. Schizoaffective disorder, depressive type (H)    2. Posttraumatic stress disorder        Collateral Reports Completed:  Not Applicable    Plan: (Homework, other):  Patient was given information about behavioral services and encouraged to schedule a follow up appointment with the clinic [facility] as needed to work on helping the patient with management of mood states and to work on stress management around her grieving process-also to help the patient with her behavioral needs to comply with treatment medical recommendations.  She was also given information about mental health symptoms and treatment options .  CD Recommendations: Maintain Sobriety.    Richardson Lopez [facility], [facility]                                                    Treatment Plan    Client's Name: Nena Tang  YOB: 1960    Date: 6-4-2019    DSM5 Diagnoses: (Sustained by DSM5 Criteria Listed Above)  Diagnoses:  295.70 (F25.0), Schizoaffective disorder, depressive type; 309.81 (F43.10) Posttraumatic Stress Disorder with panic specifier, history of chemical dependency issues (polysubstance dependence)  Psychosocial & Contextual Factors: Academics / Education - yes  Activities of Daily Living - yes  Financial management yes  Follow through with Medical recommendations - yes  Occupational / Vocational - yes  Social / Relational - yes, grief and loss  WHODAS Score: 30      Referral / Collaboration:  Referral to another professional/service is not indicated at this  time..    Anticipated number of session or this episode of care: 20      MeasurableTreatment Goal(s) related to diagnosis / functional impairment(s)  Goal 1: Client will improve her ability to manage anxiety and mood states.     I will know I've met my goal when the man does not paralyze the me with fear.     Objective #A (Client Action)    Client will increase understanding of steps in the grief process.  Status: Continued - Date(s):  9-: able to remember the good time and able to remember these important people without wanting to die-she stated that she needs more work to be able to remember and not fall apart.     Intervention(s)  Therapist will teach emotional recognition/identification. teach the navigation of grieving encouraging acceptance and adjustment.    Objective #B  Client will Decrease frequency and intensity of feeling down, depressed, hopeless  Decrease thoughts that you'd be better off dead or of suicide / self-harm.  Status: Continued - Date(s):   9-: she does not think about harming the self as much     Intervention(s)  Therapist will teach emotional regulation skills. with the use of mindful coping strategies and open communication of feelings and thoughts (getting it out to another person).    Objective #C  Client will identify at least 3 example(s) of how drinking has resulted in an experience that interferes with person values or goals.  Status: Completed - Date: She stated she was sober  for 25 years. 9-   Intervention(s)  Therapist will teach the client how to perform a behavioral chain analysis. Process the experience of playing the tape forward to help with making the next right decision. .          Client has reviewed and agreed to the above plan.      Richardson Lopez, [facility]  June 4, 2019          ______________________________________________________________________

## 2019-12-03 NOTE — PROGRESS NOTES
"SUBJECTIVE/OBJECTIVE:                Nena Tang is a 59 year old female coming in for a follow-up visit for Medication Therapy Management.  She was referred to me from Rowena Haas. Seen as a covisit with PCP and Richardson Lopez [facility].     Chief Complaint: Follow up from [facility] visit on 19.  Brought in a letter today from foster home staff stating they are concerned about pt's ability to manage diabetes, specifically referencing an incident of taking more Novolog than prescribed and having hypoglycemia.     Tobacco:  reports that she has never smoked. She has never used smokeless tobacco.  Alcohol: not currently using    Medication Adherence/Access: no issues reported  Adult foster home staff sets up and administers medications, except when pt leaves home (attends drop in center daily).     Schizoaffective: Currently taking sertraline 200mg daily, Invega 9mg daily, benztropine 1mg BID, clonazepam 0.5mg HS (for nighttime hallucinations, working well).   Reports mood is unchanged. States she is happy to be reconnected with a friend from her past.   Possible side effects: reduced alertness (notable during appt), sedation, muscle jerks, shortterm memory impairment.   She denies SI today. However, describes an incident she had last week when her blood sugar was high and she knowingly gave herself too much Novolog to \"hurt herself\".  See [facility] notes for details.  She has a therapist at the drop-in center.    Sleep apnea: Per home staff, no longer using CPAP. Previously had been trying to use her mask but difficulty with fit and was having skin breakdown from mask. States she has been sleeping through the night but \"on and off\". She does admit to feeling more tired.    Diabetes: Pt currently taking lantus 60u at bedtime, novolog 20u usually 2-3 times a day with meals, and semaglutide 1mg weekly. Pt is not experiencing side effects.    SMB times daily.  Ranges (logbook):   Date FBG/ 2hours post Lunch/2hours post " Dinner /2hours post Comments   12/2 166 257 168/143    12/1 196 267 /269     161 140 118     141 314 246/111 *this is the day pt took double dose of insulin    131  161/302  (133 @2am)     140 81,100 203/243     122 292 127/48,63,76,173/218       Patient is experiencing hypoglycemia, symptoms dizziness and resolved with food. Reports an episode she intentionally took more insulin than prescribed as above.  Asking today what would happen if she takes too much insulin but when educated on resulting symptoms of severe hypoglycemia, pt states she will never do this again. Did not feel well with BS in 40s.   Recent symptoms of high blood sugar? none  Eye exam: up to date  Foot exam: up to date  ACEi/ARB: No.   Urine Albumin:         Lab Results   Component Value Date     UMALCR 4.47 05/24/2019      Aspirin: Taking 81mg daily and denies side effects  Diet/Exercise: did not discuss today. Frequently eats lunch off site.  Lab Results   Component Value Date    A1C 6.6 08/06/2019    A1C 7.6 05/15/2019    A1C 6.4 12/20/2018    A1C 6.4 08/06/2018    A1C 6.2 05/22/2018     Obesity: currently taking topiramate 150mg BID, Ozempic 1mg weekly. She has been taking both medications at this dose for >3 months.   Wt Readings from Last 10 Encounters:   12/03/19 245 lb 8 oz (111.4 kg)   11/21/19 245 lb (111.1 kg)   11/04/19 240 lb 4.8 oz (109 kg)   11/01/19 244 lb (110.7 kg)   10/25/19 243 lb (110.2 kg)   10/16/19 242 lb (109.8 kg)   10/11/19 242 lb (109.8 kg)   10/05/19 217 lb 3.2 oz (98.5 kg)   09/23/19 230 lb (104.3 kg)   09/10/19 242 lb (109.8 kg)     Shoulder pain: currently taking gabapentin 900mg TID and steroid injections previously administered.  Pain controlled after receiving injections.     Today's Vitals: [facility] 01/06/2015   BP Readings from Last 1 Encounters:   12/03/19 132/80     Pulse Readings from Last 1 Encounters:   12/03/19 82     Wt Readings from Last 1 Encounters:   12/03/19 245 lb 8 oz (111.4 kg)     Ht Readings from  "Last 1 Encounters:   12/03/19 5' 1.5\" (1.562 m)     Estimated body mass index is 45.64 kg/m  as calculated from the following:    Height as of an earlier encounter on 12/3/19: 5' 1.5\" (1.562 m).    Weight as of an earlier encounter on 12/3/19: 245 lb 8 oz (111.4 kg).    Temp Readings from Last 1 Encounters:   12/03/19 97.9  F (36.6  C) (Temporal)        ASSESSMENT:                  Medication Adherence: excellent, no issues identified    Schizoaffective: needs improvement. Concerning for polypharmacy CNS side effects from her medication regimen, especially with sedation and memory impairment, particularly these agents: benztropine, clonazepam, diphenhydramine, gabapentin, topiramate, Invega.  Will start with non mental-health medications and start taper to discontinuation - see below. Next recommend dose reduction of clonazepam and close monitoring.    Sleep apnea: needs improvement. Encouraged restarting CPAP.    Diabetes: needs improvement. Variable BS and pt having difficulty following her diet and Novolog instructions. Will start to taper some of her other medications and see if this improves her success with her longterm regimen.  Though she is eligible for refill of the Dexcom transmitter, will hold off on restarting Dexcom due to complexity.     Obesity: unchanged with topiramate and will start to taper off with above concerning side effects.     Shoulder pain: improved with injection; will start to taper gabapentin as above.        PLAN:                  1. Decrease topiramate to 50mg BID  2. Decrease gabapentin to 600mg TID    I spent 60 minutes with this patient today. All changes were made via collaborative practice agreement with Rowena Haas. A copy of the visit note was provided to the patient's primary care provider.     Will follow up in 2-4 weeks.    The patient was given a summary of these recommendations as an after visit summary.    Eugenia De Jesus, KiD, BCACP   "

## 2019-12-03 NOTE — PATIENT INSTRUCTIONS
-Labs today, will call with the results.   -Decrease Gabapentin to 600 mg three times per day.   -Continue to stay off the naltrexone for now.   -Decrease Topamax to 50 mg twice per day.   -Call clinic with any questions or concerns.

## 2019-12-06 RX ORDER — FAMOTIDINE 40 MG/1
40 TABLET, FILM COATED ORAL AT BEDTIME
Qty: 30 TABLET | Refills: 1 | Status: ON HOLD | OUTPATIENT
Start: 2019-12-06 | End: 2019-12-21

## 2019-12-12 ENCOUNTER — OFFICE VISIT (OUTPATIENT)
Dept: BEHAVIORAL HEALTH | Facility: CLINIC | Age: 59
End: 2019-12-12
Payer: MEDICARE

## 2019-12-12 DIAGNOSIS — F43.10 POSTTRAUMATIC STRESS DISORDER: ICD-10-CM

## 2019-12-12 DIAGNOSIS — F25.1 SCHIZOAFFECTIVE DISORDER, DEPRESSIVE TYPE (H): Primary | ICD-10-CM

## 2019-12-12 PROCEDURE — 90791 PSYCH DIAGNOSTIC EVALUATION: CPT | Performed by: SOCIAL WORKER

## 2019-12-12 NOTE — PROGRESS NOTES
[facility] - Integrated Primary Care: Integrated Behavioral Health  Integrated Behavioral Health Services   Diagnostic Assessment Update      PATIENT'S NAME: Nena Tang  MRN:   [id number]  :   1960  DATE OF SERVICE: 2019  SERVICE LOCATION: Face to Face in Clinic  Visit Activities: [facility] Only    Identifying Information:  Patient is a 59 year old year old, ,  female.  Patient attended the session alone.        Updates on Presenting Concern(s):  Patient reports the following reason(s) for continuing to receive behavioral services: the patient stated that she wants to continue to improve her ability to respond more healthy to hallucinations-to continue to improve her management of her mood and improve her management of her anxiety.  Patient reported that her symptoms and concerns are improving.  Patient attributed the status of her current symptoms to changes in their life stressors and changes in their significant relationships .      Patient stated that her symptoms have resulted in the following functional impairments: chronic disease management, health maintenance, home life with in her placement, relationship(s), social interactions and work / vocational responsibilities    Patient reports that other professional(s) are involved in providing support / services.      Standard Screening tools completed, including PHQ9 and GAD7.  See Epic for today's results.  Historical PHQ9:  PHQ-9 SCORE 3/13/2018 10/26/2018 2019   PHQ-9 Total Score - - -   PHQ-9 Total Score - - -   PHQ-9 Total Score 14 20 22     Historical GAD7:  TAMIA-7 SCORE 2/3/2017 3/13/2018 10/26/2018   Total Score - - -   Total Score 0 17 19   Total Score - - -       Review of Updates to Patient's Life Situation:  Patient reported experiencing relationship changes, which included she had a family member pass away recently.  Patient identified some changes in the stability of their social connections and  identified supports. Patient noted that their living situation did not change within the last year.     Patient's employment status did change.  Patient is currently unemployed.     Patient reported no changes or new involvment with the legal system.  There are no ethnic, cultural or Gnosticist factors that may be relevant for therapy.       Mental Health History:  Patient is currently receiving the following services: case management, counseling and physician / PCP.      Chemical Health History:   Patient is not currently receiving any chemical dependency treatment. Patient reported the following problems as a result of drinking and drug use: family problems, legal issues, occupational / vocational problems and relationship problems.      Cage-AID score is: 0  Based on Cage-Aid score and clinical interview there is a history of chemical abuse and the patient reported that she has been sober for many years.      Discussed the general effects of drugs and alcohol on health and well-being.      Significant Losses / Trauma / Abuse / Neglect Issues:  There are new indications or report of significant loss, trauma, abuse or neglect issues related to: death of signficant family member and job loss yes.    Issues of possible neglect are not present.      Medical History:   Patient Active Problem List   Diagnosis     Osteopenia     Vitamin B12 deficiency without anemia     Hyperlipidemia LDL goal <100     Rotator cuff syndrome     Type 2 diabetes mellitus with mild nonproliferative retinopathy (H)     Illiterate     Irritable bowel syndrome     overweight - BMI >35     Takotsubo cardiomyopathy     CAD (coronary artery disease)     Restless legs syndrome (RLS)     CINDY (obstructive sleep apnea)- mild AHI 10.3     Verbal auditory hallucination     Chronic low back pain     Schizoaffective disorder, depressive type (H)     Migraine headache     HTN, goal below 140/90     Health Care Home     Lumbago     Cervicalgia     Cocaine  abuse, episodic (H)     Suicidal ideation     Esophageal reflux     Mild nonproliferative diabetic retinopathy (H)     Tardive dyskinesia     Alcohol use     Left cataract     Falls frequently     History of uterine cancer     Psychophysiological insomnia     Dysuria     Asymptomatic postmenopausal status     Abdominal pain, right lower quadrant     Infectious encephalopathy     Non-intractable vomiting with nausea     Thoughts of self harm     Gastroenteritis     Posttraumatic stress disorder     Cervical cancer screening     Type 2 diabetes mellitus with stage 3 chronic kidney disease, with long-term current use of insulin (H)     Positive AIDA (antinuclear antibody)     Hyperglycemia     Pneumonia       Medication Review:  Current Outpatient Medications   Medication     aspirin (ASA) 81 MG EC tablet     atorvastatin (LIPITOR) 80 MG tablet     benzonatate (TESSALON) 100 MG capsule     benztropine (COGENTIN) 0.5 MG tablet     blood glucose (NO BRAND SPECIFIED) test strip     calcium carbonate (OS-ALLEN) 1500 (600 Ca) MG tablet     cholecalciferol (VITAMIN D3) 94378 units (1250 mcg) capsule     clonazePAM (KLONOPIN) 0.5 MG tablet     Continuous Blood Gluc Sensor (DEXCOM G5 MOB/G4 PLAT SENSOR) MISC     Continuous Blood Gluc Transmit (DEXCOM G5 MOBILE TRANSMITTER) MISC     dextromethorphan-guaiFENesin (MUCINEX DM)  MG 12 hr tablet     diphenhydrAMINE (BENADRYL) 25 MG tablet     famotidine (PEPCID) 40 MG tablet     gabapentin (NEURONTIN) 300 MG capsule     guaiFENesin (ROBITUSSIN) 100 MG/5ML liquid     ibuprofen (ADVIL/MOTRIN) 200 MG tablet     insulin aspart (NOVOLOG FLEXPEN) 100 UNIT/ML pen     insulin glargine (LANTUS SOLOSTAR PEN) 100 UNIT/ML pen     insulin pen needle (NOVOFINE 30) 30G X 8 MM miscellaneous     ipratropium - albuterol 0.5 mg/2.5 mg/3 mL (DUONEB) 0.5-2.5 (3) MG/3ML neb solution     lidocaine (LIDODERM) 5 % patch     losartan (COZAAR) 50 MG tablet     melatonin 5 MG CAPS     metoprolol succinate  ER (TOPROL-XL) 25 MG 24 hr tablet     naltrexone (DEPADE/REVIA) 50 MG tablet     nystatin (MYCOSTATIN) 930487 UNIT/GM external cream     nystatin (MYCOSTATIN) 743192 UNIT/GM external powder     order for DME     order for DME     order for DME     paliperidone ER (INVEGA) 9 MG 24 hr tablet     polyethylene glycol (MIRALAX) powder     semaglutide (OZEMPIC, 1 MG/DOSE,) 2 MG/1.5ML pen     sertraline (ZOLOFT) 100 MG tablet     topiramate (TOPAMAX) 50 MG tablet     zinc oxide (DESITIN) 40 % external ointment     No current facility-administered medications for this visit.        Medication Compliance:  Yes    Patient was provided recommendation to follow-up with physician.      Mental Status Assessment:  Appearance:   Appropriate   Eye Contact:   Good   Psychomotor Behavior: Normal   Attitude:   Cooperative   Orientation:   All  Speech   Rate / Production: Normal    Volume:  Normal   Mood:    Normal  Affect:    Appropriate  Blunted   Thought Content:  Clear   Thought Form:  Coherent  Goal Directed  Logical   Insight:    Fair       Safety Assessment:    Patient has had a history of suicidal ideation: yes in the past and suicide attempts: yes in the past  Patient denies current or recent suicidal ideation or behaviors.  Patient denies current or recent homicidal ideation or behaviors.  Patient denies current or recent self injurious behavior or ideation.  Patient denies other safety concerns.  Patient reports there are no firearms in the house  Protective Factors Sense of responsibility to family, Religiosity, Reality testing ability, Positive social support and Positive therapeutic releationships   Risk Factors History of attempted suicide, Lack of sleep, Sense of hopelessness and/or helplessness and Sense of worthlessness      Plan for Safety and Risk Management:  A safety and risk management plan has not been developed at this time, however patient was encouraged to call [facility] should there be a change in  any of these risk factors.      Patient's Strengths and Limitations:  Patient identified the following strengths or resources that will help her succeed in counseling: , Hindu / Jehovah's witness, commitment to health and well being, exercise routine, demi / spirituality, friends / good social support, family support and sense of humor. Patient identified the following supports: Uatsdin / spirituality, support group and . Things that may interfere with the patient's success in counseling include:few friends, lack of social support and physical health concerns.    Diagnostic Criteria:    -the patient is waiting to go to an interview for employment     -she has not had hallucinations in a while    -no current thoughts of wanting to harm self today-has had recent thoughts of wanting to hurt self    1. Depressed mood most of the day, nearly every day, as indicated by either subjective report (e.g., feels sad, empty, hopeless) or observation made by others (e.g., appears tearful). (Note: In children and adolescents, can be irritable mood.)  2. Markedly diminished interest or pleasure in all, or almost all, activities most of the day, nearly every day (as indicated by either subjective account or observation).  3. Insomnia or hypersomnia nearly every day.  4. Psychomotor agitation or retardation nearly every day (observable by others, not merely subjective feelings of restlessness or being slowed down).  5. Fatigue or loss of energy nearly every day.  6. Feelings of worthlessness or excessive or inappropriate guilt (which may be delusional) nearly every day (not merely self-reproach or guilt about being sick).  7. Diminished ability to think or concentrate, or indecisiveness, nearly every day (either by subjective account or as observed by others).  8. Recurrent thoughts of death (not just fear of dying), recurrent suicidal ideation without a specific plan, or a suicide attempt or a specific plan for  committing suicide.  The symptoms cause clinically significant distress or impairment in social, occupational, or other important areas of functioning.    Exposure to actual or threatened death, serious injury, or sexual violence in one (or more) of the following ways:  9. Directly experiencing the traumatic event(s).  10. Witnessing, in person, the event(s) as it occurred to others.  11. Learning that the traumatic event(s) occurred to a close family member or close friend. In cases of actual or threatened death of a family member or friend, the event(s) must have been violent or accidental.  12. Experiencing repeated or extreme exposure to aversive details of the traumatic event(s) (e.g., first responders collecting human remains; police officers repeatedly exposed to details of child abuse).  Note: Criterion A4 does not apply to exposure through electronic media, television, movies, or pictures, unless this exposure is work related.  Presence of one (or more) of the following intrusion symptoms associated with the traumatic event(s), beginning after the traumatic event(s) occurred:  1. Recurrent, involuntary, and intrusive distressing memories of the traumatic event(s).  Note: In children older than 6 years, repetitive play may occur in which themes or aspects of the traumatic event(s) are expressed.  2. Intense or prolonged psychological distress at exposure to internal or external cues that symbolize or resemble an aspect of the traumatic event(s).  3. Marked physiological reactions to internal or external cues that symbolize or resemble an aspect of the traumatic event(s).  Persistent avoidance of stimuli associated with the traumatic event(s), beginning after the traumatic event(s) occurred, as evidenced by one or both of the followin. Avoidance of or efforts to avoid distressing memories, thoughts, or feelings about or closely associated with the traumatic event(s).  2. Avoidance of or efforts to avoid  external reminders (people, places, conversations, activities, objects, situations) that arouse distressing memories, thoughts, or feelings about or closely associated with the traumatic event(s).  Negative alterations in cognitions and mood associated with the traumatic event(s), beginning or worsening after the traumatic event(s) occurred, as evidenced by two (or more) of the followin. Persistent negative emotional state (e.g., fear, horror, anger, guilt, or shame).  2. Markedly diminished interest or participation in significant activities.  3. Feelings of detachment or estrangement from others.  4. Persistent inability to experience positive emotions (e.g., inability to experience happiness, satisfaction, or loving feelings).  Marked alterations in arousal and reactivity associated with the traumatic event(s), beginning or worsening after the traumatic event(s) occurred, as evidenced by two (or more) of the followin. Hypervigilance.  2. Problems with concentration.  3. Sleep disturbance (e.g., difficulty falling or staying asleep or restless sleep).  Duration of the disturbance (Criteria B, C, D, and E) is more than 1 month.  The disturbance causes clinically significant distress or impairment in social, occupational, or other important areas of functioning.    History of chemical abuse and has been sober for years (20 + years)        Functional Status:  Patient's symptoms have caused and are causing reduced functional status in the following areas: Follow through with Medical recommendations - yes  Occupational / Vocational - yes  Social / Relational - yes      DSM5 Diagnoses: (Sustained by DSM5 Criteria Listed Above)  Diagnoses: 295.70 Schizoaffective Disorder Depressive Type, 309.81 Posttraumatic Stress Disorder, History of chemical abuse and has been sober for years (20 + years)  Abdominal pain, right lower quadrant   Alcohol use   Asymptomatic postmenopausal status   CAD (coronary artery disease)    Cervical cancer screening   Cervicalgia   Chronic low back pain   Cocaine abuse, episodic (H)   Dysuria   Esophageal reflux   Falls frequently   Gastroenteritis   Health Care Home   History of uterine cancer   HTN, goal below 140/90   Hyperglycemia   Hyperlipidemia LDL goal <100   Illiterate   Infectious encephalopathy   Irritable bowel syndrome   Left cataract   Lumbago   Migraine headache   Mild nonproliferative diabetic retinopathy (H)   Non-intractable vomiting with nausea   CINDY (obstructive sleep apnea)- mild AHI 10.3   Osteopenia   overweight - BMI >35   Pneumonia   Positive AIDA (antinuclear antibody)   Posttraumatic stress disorder   Psychophysiological insomnia   Restless legs syndrome (RLS)   Rotator cuff syndrome   Schizoaffective disorder, depressive type (H)   Suicidal ideation   Takotsubo cardiomyopathy   Tardive dyskinesia   Thoughts of self harm   Type 2 diabetes mellitus with mild nonproliferative retinopathy (H)   Type 2 diabetes mellitus with stage 3 chronic kidney disease, with long-term current use of insulin (H)   Verbal auditory hallucination   Vitamin B12 deficiency without anemia     Psychosocial & Contextual Factors: Follow through with Medical recommendations - yes  Occupational / Vocational - yes  Social / Relational - yes  -the patient has history of traumatic events  WHODAS Score: Will have the patient complete at the next visit  See Media section of [facility] medical record for completed WHODAS    Preliminary Treatment Plan:    Treatment will focus on: Depressed Mood - The patient will score below 5 on PHQ-9  Anxiety - The patient will identify at least 2 successful ways to manage anxiety  Risk Management / Safety Concerns related to: Suicidal ideation  Grief / Loss - The patient will move progressively through the grieving process for her significant losses   Alcohol / Substance Use - The patient will remain sober of chemical use for the next 90 days  Thought Disturbance including:  delusions and hallucinations.    The Following referrals were discussed and initiated: Outpatient Therapy    Collaboration with other professionals is not indicated at this time.    Referral to another professional/service is not indicated at this time.    A Release of Information is not needed at this time.    Report to child or adult protection services was NA.    Richardson Lopez, [facility], Behavioral Health Clinician

## 2019-12-16 RX ORDER — CLONAZEPAM 0.5 MG/1
0.5 TABLET ORAL AT BEDTIME
Qty: 21 TABLET | Refills: 0 | Status: ON HOLD | OUTPATIENT
Start: 2019-12-16 | End: 2019-12-31

## 2019-12-16 NOTE — TELEPHONE ENCOUNTER
Reason for Call:  Other prescription    Detailed comments: Bar from [facility] called wanting an update on this prescription. Writer explained the original request came too early, which is why the medication was not filled.   Bar requests the next prescription be prescribed for 21 days, so that the next time she picks up her medication they are back on track for 30 day fills.        Phone Number Pharmacy can be reached at:  [phone]    Best Time: Any    Can we leave a detailed message on this number? YES    Call taken on 12/16/2019 at 12:55 PM by Yamini Jefferson

## 2019-12-16 NOTE — TELEPHONE ENCOUNTER
Rowena,  Routing refill request to provider for review/approval because:  Drug not on the [facility] refill protocol     : last dispensed 12/03/2019 #9/9 day supply    Pharmacy is requesting 21 day supply so the patient can be back on track for q30 day refills    Thank You!  Kalie Perez, RN  Triage Nurse

## 2019-12-18 ENCOUNTER — NURSE TRIAGE (OUTPATIENT)
Dept: NURSING | Facility: CLINIC | Age: 59
End: 2019-12-18

## 2019-12-19 ENCOUNTER — MEDICAL CORRESPONDENCE (OUTPATIENT)
Dept: HEALTH INFORMATION MANAGEMENT | Facility: CLINIC | Age: 59
End: 2019-12-19

## 2019-12-19 NOTE — TELEPHONE ENCOUNTER
Alva from group home ([phone]) calling.     Low blood sugar, 55 at 8:30 pm. BG was treated and went up to 167. Now BG is 126. Patient already received 60 units of Lantus tonight. Is supposed to get weekly injection of Semaglutide tonight. Group home wondering if they can hold the injection tonight due to low sugar earlier. Patient has appointment on Friday 12/20/19.    RN paged on call provider for [facility], Violetta Solorzano CNP via smart web at 10:15 pm.     Provider states to hold weekly injection of Semiglutide until patient is seen on Friday.     Group home staff was notified and verbalized understanding.     Antonella Saul RN/[facility]    Reason for Disposition    Caller has URGENT medication question about med that PCP prescribed and triager unable to answer question    Protocols used: MEDICATION QUESTION CALL-A-

## 2019-12-20 ENCOUNTER — HOSPITAL ENCOUNTER (INPATIENT)
Facility: CLINIC | Age: 59
LOS: 12 days | Discharge: NURSING, REHAB OR LONG-TERM CARE | DRG: 885 | End: 2020-01-02
Attending: PSYCHIATRY & NEUROLOGY | Admitting: PSYCHIATRY & NEUROLOGY
Payer: MEDICARE

## 2019-12-20 ENCOUNTER — OFFICE VISIT (OUTPATIENT)
Dept: FAMILY MEDICINE | Facility: CLINIC | Age: 59
End: 2019-12-20
Payer: MEDICARE

## 2019-12-20 ENCOUNTER — OFFICE VISIT (OUTPATIENT)
Dept: BEHAVIORAL HEALTH | Facility: CLINIC | Age: 59
End: 2019-12-20
Payer: MEDICARE

## 2019-12-20 VITALS
BODY MASS INDEX: 44.81 KG/M2 | SYSTOLIC BLOOD PRESSURE: 102 MMHG | HEIGHT: 62 IN | WEIGHT: 243.5 LBS | RESPIRATION RATE: 16 BRPM | TEMPERATURE: 99.9 F | DIASTOLIC BLOOD PRESSURE: 68 MMHG | OXYGEN SATURATION: 96 % | HEART RATE: 81 BPM

## 2019-12-20 DIAGNOSIS — N18.30 TYPE 2 DIABETES MELLITUS WITH STAGE 3 CHRONIC KIDNEY DISEASE, WITH LONG-TERM CURRENT USE OF INSULIN (H): ICD-10-CM

## 2019-12-20 DIAGNOSIS — M54.50 CHRONIC RIGHT-SIDED LOW BACK PAIN WITHOUT SCIATICA: ICD-10-CM

## 2019-12-20 DIAGNOSIS — F25.1 SCHIZOAFFECTIVE DISORDER, DEPRESSIVE TYPE (H): ICD-10-CM

## 2019-12-20 DIAGNOSIS — R19.7 DIARRHEA, UNSPECIFIED TYPE: ICD-10-CM

## 2019-12-20 DIAGNOSIS — Z79.4 TYPE 2 DIABETES MELLITUS WITH STAGE 3 CHRONIC KIDNEY DISEASE, WITH LONG-TERM CURRENT USE OF INSULIN (H): ICD-10-CM

## 2019-12-20 DIAGNOSIS — R06.2 WHEEZING: ICD-10-CM

## 2019-12-20 DIAGNOSIS — G89.29 CHRONIC RIGHT-SIDED LOW BACK PAIN WITHOUT SCIATICA: ICD-10-CM

## 2019-12-20 DIAGNOSIS — E11.22 TYPE 2 DIABETES MELLITUS WITH STAGE 3 CHRONIC KIDNEY DISEASE, WITH LONG-TERM CURRENT USE OF INSULIN (H): ICD-10-CM

## 2019-12-20 DIAGNOSIS — L03.317 CELLULITIS, GLUTEAL: ICD-10-CM

## 2019-12-20 DIAGNOSIS — F25.0 SCHIZOAFFECTIVE DISORDER, BIPOLAR TYPE (H): ICD-10-CM

## 2019-12-20 DIAGNOSIS — Z79.4 TYPE 2 DIABETES MELLITUS WITH MILD NONPROLIFERATIVE RETINOPATHY WITHOUT MACULAR EDEMA, WITH LONG-TERM CURRENT USE OF INSULIN, UNSPECIFIED LATERALITY (H): ICD-10-CM

## 2019-12-20 DIAGNOSIS — R21 RASH AND NONSPECIFIC SKIN ERUPTION: ICD-10-CM

## 2019-12-20 DIAGNOSIS — F25.1 SCHIZOAFFECTIVE DISORDER, DEPRESSIVE TYPE (H): Primary | ICD-10-CM

## 2019-12-20 DIAGNOSIS — E11.3299 TYPE 2 DIABETES MELLITUS WITH MILD NONPROLIFERATIVE RETINOPATHY WITHOUT MACULAR EDEMA, WITH LONG-TERM CURRENT USE OF INSULIN, UNSPECIFIED LATERALITY (H): ICD-10-CM

## 2019-12-20 DIAGNOSIS — B49 FUNGAL INFECTION: ICD-10-CM

## 2019-12-20 DIAGNOSIS — J20.9 ACUTE BRONCHITIS, UNSPECIFIED ORGANISM: ICD-10-CM

## 2019-12-20 DIAGNOSIS — K59.00 CONSTIPATION, UNSPECIFIED CONSTIPATION TYPE: ICD-10-CM

## 2019-12-20 DIAGNOSIS — R05.9 COUGH: Primary | ICD-10-CM

## 2019-12-20 DIAGNOSIS — F43.10 POSTTRAUMATIC STRESS DISORDER: ICD-10-CM

## 2019-12-20 DIAGNOSIS — R45.89 THOUGHTS OF SELF HARM: Primary | ICD-10-CM

## 2019-12-20 LAB
AMPHETAMINES UR QL SCN: NEGATIVE
BARBITURATES UR QL: NEGATIVE
BENZODIAZ UR QL: NEGATIVE
CANNABINOIDS UR QL SCN: NEGATIVE
COCAINE UR QL: NEGATIVE
ETHANOL UR QL SCN: NEGATIVE
GLUCOSE BLDC GLUCOMTR-MCNC: 110 MG/DL (ref 70–99)
GLUCOSE BLDC GLUCOMTR-MCNC: 190 MG/DL (ref 70–99)
GLUCOSE BLDC GLUCOMTR-MCNC: 219 MG/DL (ref 70–99)
OPIATES UR QL SCN: NEGATIVE

## 2019-12-20 PROCEDURE — 00000146 ZZHCL STATISTIC GLUCOSE BY METER IP

## 2019-12-20 PROCEDURE — 99285 EMERGENCY DEPT VISIT HI MDM: CPT | Mod: 25 | Performed by: PSYCHIATRY & NEUROLOGY

## 2019-12-20 PROCEDURE — 90791 PSYCH DIAGNOSTIC EVALUATION: CPT

## 2019-12-20 PROCEDURE — 90839 PSYTX CRISIS INITIAL 60 MIN: CPT | Performed by: SOCIAL WORKER

## 2019-12-20 PROCEDURE — 80320 DRUG SCREEN QUANTALCOHOLS: CPT | Performed by: FAMILY MEDICINE

## 2019-12-20 PROCEDURE — 99284 EMERGENCY DEPT VISIT MOD MDM: CPT | Mod: Z6 | Performed by: PSYCHIATRY & NEUROLOGY

## 2019-12-20 PROCEDURE — 99207 ZZC OFFICE-HOSPITAL ADMIT: CPT | Performed by: NURSE PRACTITIONER

## 2019-12-20 PROCEDURE — 80307 DRUG TEST PRSMV CHEM ANLYZR: CPT | Performed by: FAMILY MEDICINE

## 2019-12-20 ASSESSMENT — ACTIVITIES OF DAILY LIVING (ADL)
TRANSFERRING: 1-->ASSISTIVE EQUIPMENT
TOILETING: 0-->INDEPENDENT
SWALLOWING: 0-->SWALLOWS FOODS/LIQUIDS WITHOUT DIFFICULTY
COGNITION: 0 - NO COGNITION ISSUES REPORTED
RETIRED_EATING: 0-->INDEPENDENT
FALL_HISTORY_WITHIN_LAST_SIX_MONTHS: NO
DRESS: 0-->INDEPENDENT
BATHING: 1-->ASSISTIVE EQUIPMENT
AMBULATION: 1-->ASSISTIVE EQUIPMENT
WHICH_OF_THE_ABOVE_FUNCTIONAL_RISKS_HAD_A_RECENT_ONSET_OR_CHANGE?: AMBULATION;TRANSFERRING;BATHING
RETIRED_COMMUNICATION: 0-->UNDERSTANDS/COMMUNICATES WITHOUT DIFFICULTY

## 2019-12-20 ASSESSMENT — ENCOUNTER SYMPTOMS
ABDOMINAL PAIN: 0
DYSPHORIC MOOD: 1
NERVOUS/ANXIOUS: 0
HALLUCINATIONS: 0
DIZZINESS: 0
BACK PAIN: 0
SHORTNESS OF BREATH: 0
CHEST TIGHTNESS: 0
FEVER: 0

## 2019-12-20 ASSESSMENT — MIFFLIN-ST. JEOR
SCORE: 1586.96
SCORE: 1629.82

## 2019-12-20 NOTE — ED NOTES
Bed: ED11  Expected date:   Expected time:   Means of arrival:   Comments:  Security bringing intoxication

## 2019-12-20 NOTE — PROGRESS NOTES
SUBJECTIVE:                                                    Nena Tang is a 59 year old female who presents to clinic today for the following health issues:  [facility]: [place]    Suicidal: Patient states that she has been having issues controlling her diabetes. Struggling with her mood. States that she needs to get her psychiatry medications adjusted. Patient states that she would like to spend the holidays with her son and would like to go to the hospital after the holidays. Patient has had two incidences of low blood sugars after she over treated herself. Patient states that she did not like the feeling of having such a low blood sugar. But when asked, patient stated that yes;  she wanted to kill herself.     Patient states that he has not been using her CPAP nightly because it is too tight and needs to be adjusted as instructed at the sleep clinic.    Diabetes Follow-up  Patient states that she is here for follow up: Hypoglycemic episodes  X 2 days on 12/17/129 patient had blood sugar of 69 at 6:05 PM before dinner. Treatment  1 banana in addition to dinner meal; Recheck at 116. Symptoms reported: dizziness and room spinning . Paramedics were called pt reports she had taken insulin after lunch at the [employer] in [place] and thereafter went for a walk at the [employer]. Paramedics educated pt on the dangerous effects of insulin stacking.   How often are you checking your blood sugar? Four or more times daily  Blood sugar testing frequency justification:  Uncontrolled diabetes.   What time of day are you checking your blood sugars (select all that apply)?  Before and after meals  Have you had any blood sugars above 200?  Yes  Have you had any blood sugars below 70?  Yes     What symptoms do you notice when your blood sugar is low?  Dizzy and Blurred vision    What concerns do you have today about your diabetes? Yes, low blood sugar and symptoms of dizziness and confusion. Patient requesting a consult on how to  avoid these episodes and what to do when they do occur.     Do you have any of these symptoms? (Select all that apply)  Excessive thirst and Blurry vision     Have you had a diabetic eye exam in the last 12 months? Yes- Date of last eye exam: Date unknown to pt at this time     BP Readings from Last 2 Encounters:   12/20/19 102/68   12/03/19 132/80     Hemoglobin A1C (%)   Date Value   12/03/2019 6.2 (H)   08/06/2019 6.6 (H)     LDL Cholesterol Calculated (mg/dL)   Date Value   08/06/2019 81   08/06/2018 84       Diabetes Management Resources        Problems taking medications regularly: No    Medication side effects: none    Diet: diabetic    Social History     Tobacco Use     Smoking status: Never Smoker     Smokeless tobacco: Never Used   Substance Use Topics     Alcohol use: No     Frequency: Never     Comment: last month        Problem list and histories reviewed & adjusted, as indicated.  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 (H)     Illiterate     Irritable bowel syndrome     overweight - BMI >35     Takotsubo cardiomyopathy     CAD (coronary artery disease)     Restless legs syndrome (RLS)     CINDY (obstructive sleep apnea)- mild AHI 10.3     Verbal auditory hallucination     Chronic low back pain     Schizoaffective disorder, depressive type (H)     Migraine headache     HTN, goal below 140/90     Health Care Home     Lumbago     Cervicalgia     Cocaine abuse, episodic (H)     Suicidal ideation     Esophageal reflux     Mild nonproliferative diabetic retinopathy (H)     Tardive dyskinesia     Alcohol use     Left cataract     Falls frequently     History of uterine cancer     Psychophysiological insomnia     Dysuria     Asymptomatic postmenopausal status     Abdominal pain, right lower  quadrant     Infectious encephalopathy     Non-intractable vomiting with nausea     Thoughts of self harm     Gastroenteritis     Posttraumatic stress disorder     Cervical cancer screening     Type 2 diabetes mellitus with stage 3 chronic kidney disease, with long-term current use of insulin (H)     Positive AIDA (antinuclear antibody)     Hyperglycemia     Pneumonia     Past Surgical History:   Procedure Laterality Date     C OOPHORECTORMY FOR RAHEL, W/BX  1983    UTERINE     CATARACT IOL, RT/LT Bilateral 2017     COLONOSCOPY N/A 3/16/2017    Procedure: COLONOSCOPY;  Surgeon: Traci Gonzalez MD;  Location:  GI     Coronary CTA  5/21/2014     HYSTERECTOMY  1983    uterine cancer yearly pap's per provider.     LAPAROSCOPIC CHOLECYSTECTOMY  2008     PHACOEMULSIFICATION CLEAR CORNEA WITH STANDARD INTRAOCULAR LENS IMPLANT Left 5/5/2017    Procedure: PHACOEMULSIFICATION CLEAR CORNEA WITH STANDARD INTRAOCULAR LENS IMPLANT;  LEFT EYE PHACOEMULSIFICATION CLEAR CORNEA WITH STANDARD INTRAOCULAR LENS IMPLANT ;  Surgeon: Tyra Diaz MD;  Location:  [facility]     PHACOEMULSIFICATION CLEAR CORNEA WITH STANDARD INTRAOCULAR LENS IMPLANT Right 6/30/2017    Procedure: PHACOEMULSIFICATION CLEAR CORNEA WITH STANDARD INTRAOCULAR LENS IMPLANT;  RIGHT EYE PHACOEMULSIFICATION CLEAR CORNEA WITH STANDARD INTRAOCULAR LENS IMPLANT;  Surgeon: Tyra Diaz MD;  Location:  [facility]     RELEASE TRIGGER FINGER  10/11/2012    Left thumb. Procedure: RELEASE TRIGGER FINGER;  LEFT THUMB TRIGGER RELEASE;  Surgeon: Tay Langley MD;  Location:  [facility]     RELEASE TRIGGER FINGER Right 12/26/2016    Procedure: RELEASE TRIGGER FINGER;  Surgeon: Albino Castañeda MD;  Location: [facility] OR       Social History     Tobacco Use     Smoking status: Never Smoker     Smokeless tobacco: Never Used   Substance Use Topics     Alcohol use: No     Frequency: Never     Comment: last month     Family History   Problem Relation Age of Onset     Cancer  Mother         BLADDER     Respiratory Mother         COPD     Gastrointestinal Disease Mother         CIRRHOSIS OF LI BOLIVAR     Alcohol/Drug Mother      Diabetes Mother      Hypertension Mother      Lipids Mother      C.A.D. Mother      Glaucoma Mother      Alcohol/Drug Sister      Mental Illness Sister      Alcohol/Drug Sister      Psychotic Disorder Sister      Cancer Maternal Grandmother         UNKNOWN TYPE     Cancer Brother         COLON     Cancer - colorectal Brother         IN HIS LATE 30S     Alcohol/Drug Brother          OF HEROIN OVERDOSE AT AGE 22 YRS     Macular Degeneration No family hx of            Current Outpatient Medications   Medication Sig Dispense Refill     aspirin (ASA) 81 MG EC tablet TAKE 1 TABLET (81MG) BY MOUTH DAILY 90 tablet 0     atorvastatin (LIPITOR) 80 MG tablet TAKE 1 TABLET (80MG) BY MOUTH DAILY 30 tablet 11     benzonatate (TESSALON) 100 MG capsule Take 1 capsule (100 mg) by mouth 3 times daily as needed for cough 30 capsule 3     benztropine (COGENTIN) 0.5 MG tablet Take 2 tablets (1 mg) by mouth 2 times daily 120 tablet 3     blood glucose (NO BRAND SPECIFIED) test strip Use to test blood sugar  3 times daily or as directed. To accompany: Blood Glucose Monitor Brands: per insurance. 100 strip 0     calcium carbonate (OS-ALLEN) 1500 (600 Ca) MG tablet Take 3 tablets (1,800 mg) by mouth daily 180 tablet 3     cholecalciferol (VITAMIN D3) 86144 units (1250 mcg) capsule TAKE 1 CAPSULE (50,000 UNITS) MONTHLY 4 capsule 3     clonazePAM (KLONOPIN) 0.5 MG tablet Take 1 tablet (0.5 mg) by mouth At Bedtime 21 tablet 0     Continuous Blood Gluc Sensor (DEXCOM G5 MOB/G4 PLAT SENSOR) MISC 1 Device every 7 days       Continuous Blood Gluc Transmit (DEXCOM G5 MOBILE TRANSMITTER) MISC 1 Device every 3 months       dextromethorphan-guaiFENesin (MUCINEX DM)  MG 12 hr tablet Take 1 tablet by mouth every 12 hours 30 tablet 0     diphenhydrAMINE (BENADRYL) 25 MG tablet Take 1 tablet in am  and 1 tablet at 2 pm. Until Rash resolves 90 tablet 0     famotidine (PEPCID) 40 MG tablet Take 1 tablet (40 mg) by mouth At Bedtime 30 tablet 1     gabapentin (NEURONTIN) 300 MG capsule Take 2 capsules (600 mg) by mouth 3 times daily 270 capsule 1     guaiFENesin (ROBITUSSIN) 100 MG/5ML liquid Take 10 mLs (200 mg) by mouth nightly as needed for cough 118 mL 0     ibuprofen (ADVIL/MOTRIN) 200 MG tablet Take 200 mg by mouth every 8 hours as needed for mild pain       insulin aspart (NOVOLOG FLEXPEN) 100 UNIT/ML pen Inject 20 Units Subcutaneous 3 times daily (with meals) Once daily, can add additional 5 units if BGs are >500mg/dL.  Hold Insulin if BG are<70. 15 mL 11     insulin glargine (LANTUS SOLOSTAR PEN) 100 UNIT/ML pen Inject 60 Units Subcutaneous At Bedtime 15 mL 11     insulin pen needle (NOVOFINE 30) 30G X 8 MM miscellaneous USE 4 DAILY OR AS DIRECTED 400 each 0     ipratropium - albuterol 0.5 mg/2.5 mg/3 mL (DUONEB) 0.5-2.5 (3) MG/3ML neb solution Take 1 vial (3 mLs) by nebulization every 6 hours as needed for shortness of breath / dyspnea or wheezing 30 vial 0     lidocaine (LIDODERM) 5 % patch Place 1 patch onto the skin every 24 hours 10 patch 0     losartan (COZAAR) 50 MG tablet Take 1 tablet (50 mg) by mouth daily 90 tablet 3     melatonin 5 MG CAPS Take 2 capsules by mouth At Bedtime 180 capsule 3     metoprolol succinate ER (TOPROL-XL) 25 MG 24 hr tablet Take 1 tablet (25 mg) by mouth daily 90 tablet 1     naltrexone (DEPADE/REVIA) 50 MG tablet Take 1/2 tablet once daily 1-2 hours prior to worst cravings for 1 week, then increase to 1 tablet daily as directed if tolerating 30 tablet 3     nystatin (MYCOSTATIN) 274499 UNIT/GM external cream Apply topically 2 times daily 30 g 3     nystatin (MYCOSTATIN) 717236 UNIT/GM external powder Apply topically once as needed for dry skin 60 g 3     order for DME Equipment being ordered: Depends. 30 each 1     order for DME Equipment being ordered: CPAP Supplies. 1  each 0     order for DME Equipment being ordered: Freestyle Gabby [place] 1 Device 0     paliperidone ER (INVEGA) 9 MG 24 hr tablet Take 1 tablet (9 mg) by mouth every morning 90 tablet 1     polyethylene glycol (MIRALAX) powder Take 17 g (1 capful) by mouth daily as needed for constipation 500 g 3     semaglutide (OZEMPIC, 1 MG/DOSE,) 2 MG/1.5ML pen Inject 1 mg Subcutaneous every 7 days 6 mL 5     sertraline (ZOLOFT) 100 MG tablet Take 2 tablets (200 mg) by mouth daily 60 tablet 3     topiramate (TOPAMAX) 50 MG tablet Take 1 tablet (50 mg) by mouth 2 times daily 270 tablet 0     zinc oxide (DESITIN) 40 % external ointment Apply topically as needed for dry skin or irritation 56 g 0     Allergies   Allergen Reactions     Imidazole Antifungals Hives     Tolerates diflucan     Ketoprofen Itching     Pruritis to topical     Lisinopril Hives     Metformin Other (See Comments)     Patient hospitalized for lactic acidosis - admitting provider suspectd caused by metformin     Metronidazole Hives     Posaconazole Hives     Tolerates diflucan     Recent Labs   Lab Test 12/03/19  1143 11/17/19  1355  09/14/19  1915 08/06/19  1043  05/24/19  0916  05/15/19  1822  12/20/18  1532  08/06/18  1038  06/27/17  1358   A1C 6.2*  --   --   --  6.6*  --   --   --  7.6*  --  6.4*  --  6.4*   < > 7.0*   LDL  --   --   --   --  81  --   --   --   --   --   --   --  84  --  64   HDL  --   --   --   --  39*  --   --   --   --   --   --   --  46*  --  37*   TRIG  --   --   --   --  131  --   --   --   --   --   --   --  215*  --  267*   ALT 26 19  --  27  --    < > 30  --  33   < >  --    < >  --    < > 32   CR 0.91 1.02   < > 0.90  --    < > 0.87   < > 0.93   < > 0.99   < >  --    < > 0.78   GFRESTIMATED 69 60*   < > 70  --    < > 73   < > 67   < > 63   < >  --    < > 76   GFRESTBLACK 80 70   < > 81  --    < > 85   < > 78   < > 73   < >  --    < > >90   GFR Calc     POTASSIUM 4.1 4.5   < > 4.7  --    < > 4.0   < > 4.3   < > 4.2  "  < >  --    < > 4.3   TSH  --   --   --   --   --   --  1.62  --   --   --  1.98  --   --    < >  --     < > = values in this interval not displayed.      BP Readings from Last 3 Encounters:   12/20/19 102/68   12/03/19 132/80   11/21/19 118/62    Wt Readings from Last 3 Encounters:   12/20/19 110.5 kg (243 lb 8 oz)   12/03/19 111.4 kg (245 lb 8 oz)   11/21/19 111.1 kg (245 lb)          Labs reviewed in EPIC  Problem list, Medication list, Allergies, and Medical/Social/Surgical histories reviewed in [facility] and updated as appropriate.      ROS: Constitutional, neuro, ENT, endocrine, pulmonary, cardiac, gastrointestinal, genitourinary, musculoskeletal, integument and psychiatric systems are negative, except as otherwise noted above in the HPI.     OBJECTIVE:                                                    /68   Pulse 81   Temp 99.9  F (37.7  C) (Oral)   Resp 16   Ht 1.562 m (5' 1.5\")   Wt 110.5 kg (243 lb 8 oz)   LMP 01/06/2015   SpO2 96%   BMI 45.26 kg/m    Body mass index is 45.26 kg/m .    GENERAL: obese, and lethargic.  EYES: Eyes grossly normal to inspection, PERRL and conjunctivae and sclerae normal  NEURO: mentation intact but drowsy  PSYCH: tearful and appearance well groomed.    Mental Status Assessment:  Appearance:   Appropriate   Eye Contact:   Good   Psychomotor Behavior: Normal  Restless   Attitude:   Cooperative  Guarded   Orientation:   All  Speech   Rate / Production: Normal    Volume:  Normal   Mood:    Depressed  Sad   Affect:    Blunted  Worrisome   Thought Content:  Clear   Thought Form:  Coherent  Goal Directed   Insight:    Fair   Attention Span and Concentration: appropriate,   Recent and Remote Memory:  intact  Fund of Knowledge: appropriate  Muscle Strength and Tone: normal   Suicidal Ideation: reports thoughts, with intention    See [facility] notes     Diagnostic Test Results:  none      ASSESSMENT/PLAN:                                                    (R45.89) Thoughts of self " "harm  (primary encounter diagnosis)  (F25.1) Schizoaffective disorder, depressive type (H)  (F43.10) Posttraumatic stress disorder  Comment: Patient has had a couple of occassions where she reports that she has purposefully given herself more insulin with the intent to cause harm to herself. When questioned, she stated \"yes to wanting to kill herself\". Patient notes that she is aware she needs to have her psychiatry medications adjusted and would prefer to go to the hospital after the holidays. Patient states that she wants to spend [place] with her son.  Plan: Transport to the emergency room due for patient safety concerns.    (E11.22,  N18.3,  Z79.4) Type 2 diabetes mellitus with stage 3 chronic kidney disease, with long-term current use of insulin (H)  Comment: uncontrolled diabetes.   Plan: Patient will follow-up in a week to get her medications adjusted for better diabetes control.     Patient Instructions   -Transport to the emergency room.       ART F[facility]             "

## 2019-12-20 NOTE — PROGRESS NOTES
[facility] - Integrated Primary Care   December 20, 2019  Behavioral Health Clinician Progress Note    Patient Name: Nena Tang           Service Type:  Individual      Service Location:   Face to Face in Clinic     Session Start Time: 3:10pm  Session End Time:4pm      Session Length: 38 - 52      Attendees: Patient    Visit Activities (Refresh list every visit): [facility]     Diagnostic Assessment Date: 1-23-18 Updated DA completed December 12, 2019  CGI date completed: 12-  Treatment Plan Review Date: 3-  See Flowsheets for today's PHQ-9 and TAMIA-7 results  Previous PHQ-9:   PHQ-9 SCORE 3/13/2018 10/26/2018 5/7/2019   PHQ-9 Total Score - - -   PHQ-9 Total Score - - -   PHQ-9 Total Score 14 20 22     Previous TAMIA-7:   TAMIA-7 SCORE 2/3/2017 3/13/2018 10/26/2018   Total Score - - -   Total Score 0 17 19   Total Score - - -       ALEXANDRA LEVEL:  ALEXANDRA Score (Last Two) 8/30/2011 6/9/2014   ALEXANDRA Raw Score 42 37   Activation Score 66 49.9   ALEXANDRA Level 3 2       DATA  Extended Session (60+ minutes): No  Interactive Complexity: No  Crisis: Yes, visit entailed Crisis Management / Stabilization requiring urgent assessment and history of the crisis state, mental status exam and disposition  State mental health facility Patient: No    Treatment Objective(s) Addressed in This Session:  Target Behavior(s): disease management/lifestyle changes mental health    Depressed Mood: Decrease frequency and intensity of feeling down, depressed, hopeless  Improve quantity and quality of night time sleep / decrease daytime naps  Identify negative self-talk and behaviors: challenge core beliefs, myths, and actions  Improve concentration, focus, and mindfulness in daily activities   Decrease thoughts that you'd be better off dead or of suicide / self-harm  Anxiety: will experience a reduction in anxiety and will increase ability to function adaptively  Functional Impairment: will effectively address problems that interfere with adaptive  functioning  Risk / Safety: will develop strategies for more effective management of risk issues  Psychological distress related to Diabetes  Psychological distress related to Chronic Disease Management    Current Stressors / Issues:    The patient was seen by [facility] per the request of the PCP-she stated that she has been having low blood sugars-not feeling well-having thoughts of not wanting to hurt self-she stated that she gave the self double shot of insuline to see what it would do-she stated that she is trying to hurt the self when she give self double shot of insuline-somewhat trying to hurt herself-she has been feeling low and having difficulty with feeling ronal-I don't want to be in the hospital as I want to spend [place] with my family-then as the patient was putting the argument to go into the hospital after the holidays-she believes she is feeling stressed around the holiday her  is not hear and other losses that have occurred-I want to be with my son for [place]-the patient was escorted to the ED for safety assessment.     Progress on Treatment Objective(s) / Homework:  Worsening - CONTEMPLATION (Considering change and yet undecided); Intervened by assessing the negative and positive thinking (ambivalence) about behavior change    Motivational Interviewing    MI Intervention: Expressed Empathy/Understanding, Supported Autonomy, Collaboration, Evocation, Permission to raise concern or advise, Open-ended questions, Reflections: simple and complex, Rolled with resistance: Emphasized patient autonomy, Simple reflection and Evoked patient agenda and Change talk (evoked)     Change Talk Expressed by the Patient: Desire to change Reasons to change Need to change Activation Taking steps    Provider Response to Change Talk: E - Evoked more info from patient about behavior change, A - Affirmed patient's thoughts, decisions, or attempts at behavior change, R - Reflected patient's change talk and S -  Summarized patient's change talk statements      Care Plan review completed: No    Medication Review:  No changes to current psychiatric medication(s)     Medication Compliance:  NA    Changes in Health Issues:   None reported     Chemical Use Review:   Substance Use: Chemical use reviewed, no active concerns identified      Tobacco Use: No current tobacco use.      Assessment: Current Emotional / Mental Status (status of significant symptoms):  Risk status (Self / Other harm or suicidal ideation)  Patient has had a history of suicidal ideation: yes  Patient denies current fears or concerns for personal safety.  Patient reports the following current or recent suicidal ideation or behaviors: stated that she attempted to kill self recently with over taking her medications .  Patient denies current or recent homicidal ideation or behaviors.  Patient denies current or recent self injurious behavior or ideation.  Patient denies other safety concerns.  A safety and risk management plan has not been developed at this time, however patient was encouraged to call [place] [phone] should there be a change in any of these risk factors.    Appearance:   Appropriate   Eye Contact:   Good   Psychomotor Behavior: Normal   Attitude:   Cooperative   Orientation:   All  Speech   Rate / Production: Normal    Volume:  Normal   Mood:    Anxious  Depressed  Sad   Affect:    Blunted  Worrisome   Thought Content:  Clear   Thought Form:  Coherent  Goal Directed   Insight:    Fair     Diagnoses:  1. Schizoaffective disorder, depressive type (H)    2. Posttraumatic stress disorder        Collateral Reports Completed:  Not Applicable    Plan: (Homework, other):  Patient was given information about behavioral services and encouraged to schedule a follow up appointment with the clinic [facility] as needed to work on helping the patient with management of mood states and to work on stress management around her grieving process-also to help the  patient with her behavioral needs to comply with treatment medical recommendations.  She was also given information about mental health symptoms and treatment options .  CD Recommendations: Maintain Sobriety.    Richardson Lopez, [facility], [facility]                                                    Treatment Plan    Client's Name: Nena Tang  YOB: 1960    Date: 12-    DSM5 Diagnoses: (Sustained by DSM5 Criteria Listed Above)  Diagnoses:  295.70 (F25.0), Schizoaffective disorder, depressive type; 309.81 (F43.10) Posttraumatic Stress Disorder with panic specifier, history of chemical dependency issues (polysubstance dependence)  Psychosocial & Contextual Factors: Academics / Education - yes  Activities of Daily Living - yes  Financial management yes  Follow through with Medical recommendations - yes  Occupational / Vocational - yes  Social / Relational - yes, grief and loss  WHODAS Score: 30      Referral / Collaboration:  Referral to another professional/service is not indicated at this time..    Anticipated number of session or this episode of care: 20      MeasurableTreatment Goal(s) related to diagnosis / functional impairment(s)  Goal 1: Client will improve her ability to manage anxiety and mood states.     I will know I've met my goal when the man does not paralyze the me with fear.     Objective #A (Client Action)    Client will increase understanding of steps in the grief process.  Status: Continued - Date(s):  12- the patient continues to go up and down when her movement through the grieving process.   Intervention(s)  Therapist will teach emotional recognition/identification. teach the navigation of grieving encouraging acceptance and adjustment.    Objective #B  Client will Decrease frequency and intensity of feeling down, depressed, hopeless  Decrease thoughts that you'd be better off dead or of suicide / self-harm.  Status: Continued - Date(s):   12-:The patient has had more  recent increase in suicidal thoughts with actions of over taking her medications-she reported feeling more stressed and depressed-she was taken to ED for safety assessment    Intervention(s)  Therapist will teach emotional regulation skills. with the use of mindful coping strategies and open communication of feelings and thoughts (getting it out to another person).    Objective #C  Client will identify at least 3 example(s) of how drinking has resulted in an experience that interferes with person values or goals.  Status: Completed - Date: stated that she has been sober for many years 9-   Intervention(s)  Therapist will teach the client how to perform a behavioral chain analysis. Process the experience of playing the tape forward to help with making the next right decision. .          Client has reviewed and agreed to the above plan.      Richardson Lopez, [facility]  12-          ______________________________________________________________________

## 2019-12-20 NOTE — ED NOTES
Patient arrives to [facility]. Psych Associate explains process. Patient told about meeting with Mental Health  and Psychiatrist. Patient told about 2-5 hour time frame for complete evaluation. Patient offered fluids, nutrition, and comfort measures. Patient told about continuous video observation in room.

## 2019-12-20 NOTE — ED NOTES
Patient reports SI with attempt two days ago, taking overdose of insulin. Patient disclosed this to her MD today, who referred to ED for evaluation. Patient reports multiple previous attempts with overdose and cutting. Stressors include holidays without her Mother and  who have passed. Very overwhelmed with grief. Is able to contract for safety.

## 2019-12-21 PROBLEM — R45.851 DEPRESSION WITH SUICIDAL IDEATION: Status: ACTIVE | Noted: 2019-12-21

## 2019-12-21 PROBLEM — F32.A DEPRESSION WITH SUICIDAL IDEATION: Status: ACTIVE | Noted: 2019-12-21

## 2019-12-21 LAB
GLUCOSE BLDC GLUCOMTR-MCNC: 123 MG/DL (ref 70–99)
GLUCOSE BLDC GLUCOMTR-MCNC: 130 MG/DL (ref 70–99)
GLUCOSE BLDC GLUCOMTR-MCNC: 134 MG/DL (ref 70–99)
GLUCOSE BLDC GLUCOMTR-MCNC: 148 MG/DL (ref 70–99)
GLUCOSE BLDC GLUCOMTR-MCNC: 158 MG/DL (ref 70–99)
GLUCOSE BLDC GLUCOMTR-MCNC: 177 MG/DL (ref 70–99)

## 2019-12-21 PROCEDURE — 99207 ZZC CONSULT E&M CHANGED TO INITIAL LEVEL: CPT | Performed by: PHYSICIAN ASSISTANT

## 2019-12-21 PROCEDURE — 25000131 ZZH RX MED GY IP 250 OP 636 PS 637: Mod: GY | Performed by: PSYCHIATRY & NEUROLOGY

## 2019-12-21 PROCEDURE — 25000132 ZZH RX MED GY IP 250 OP 250 PS 637: Mod: GY | Performed by: PSYCHIATRY & NEUROLOGY

## 2019-12-21 PROCEDURE — 25000131 ZZH RX MED GY IP 250 OP 636 PS 637: Mod: GY | Performed by: PHYSICIAN ASSISTANT

## 2019-12-21 PROCEDURE — 99222 1ST HOSP IP/OBS MODERATE 55: CPT | Performed by: PHYSICIAN ASSISTANT

## 2019-12-21 PROCEDURE — 25000132 ZZH RX MED GY IP 250 OP 250 PS 637: Mod: GY | Performed by: NURSE PRACTITIONER

## 2019-12-21 PROCEDURE — 99223 1ST HOSP IP/OBS HIGH 75: CPT | Mod: AI | Performed by: NURSE PRACTITIONER

## 2019-12-21 PROCEDURE — 12400002 ZZH R&B MH SENIOR/ADOLESCENT

## 2019-12-21 PROCEDURE — 00000146 ZZHCL STATISTIC GLUCOSE BY METER IP

## 2019-12-21 RX ORDER — TOPIRAMATE 50 MG/1
50 TABLET, FILM COATED ORAL 2 TIMES DAILY
Status: DISCONTINUED | OUTPATIENT
Start: 2019-12-21 | End: 2019-12-21

## 2019-12-21 RX ORDER — NYSTATIN 100000 U/G
CREAM TOPICAL 2 TIMES DAILY
Status: DISCONTINUED | OUTPATIENT
Start: 2019-12-21 | End: 2020-01-02 | Stop reason: HOSPADM

## 2019-12-21 RX ORDER — ASPIRIN 81 MG/1
81 TABLET ORAL DAILY
Status: DISCONTINUED | OUTPATIENT
Start: 2019-12-21 | End: 2020-01-02 | Stop reason: HOSPADM

## 2019-12-21 RX ORDER — FAMOTIDINE 20 MG/1
40 TABLET, FILM COATED ORAL AT BEDTIME
Status: DISCONTINUED | OUTPATIENT
Start: 2019-12-21 | End: 2020-01-02 | Stop reason: HOSPADM

## 2019-12-21 RX ORDER — NICOTINE POLACRILEX 4 MG
15-30 LOZENGE BUCCAL
Status: DISCONTINUED | OUTPATIENT
Start: 2019-12-21 | End: 2020-01-02 | Stop reason: HOSPADM

## 2019-12-21 RX ORDER — ALBUTEROL SULFATE 90 UG/1
2 AEROSOL, METERED RESPIRATORY (INHALATION) EVERY 6 HOURS PRN
Status: DISCONTINUED | OUTPATIENT
Start: 2019-12-21 | End: 2020-01-02 | Stop reason: HOSPADM

## 2019-12-21 RX ORDER — LOPERAMIDE HCL 2 MG
2 CAPSULE ORAL 4 TIMES DAILY PRN
Status: DISCONTINUED | OUTPATIENT
Start: 2019-12-21 | End: 2020-01-02 | Stop reason: HOSPADM

## 2019-12-21 RX ORDER — GUAIFENESIN AND DEXTROMETHORPHAN HYDROBROMIDE 600; 30 MG/1; MG/1
1 TABLET, EXTENDED RELEASE ORAL 2 TIMES DAILY PRN
Status: DISCONTINUED | OUTPATIENT
Start: 2019-12-21 | End: 2020-01-02 | Stop reason: HOSPADM

## 2019-12-21 RX ORDER — NALTREXONE HYDROCHLORIDE 50 MG/1
50 TABLET, FILM COATED ORAL DAILY
Status: DISCONTINUED | OUTPATIENT
Start: 2019-12-21 | End: 2019-12-21

## 2019-12-21 RX ORDER — PROCHLORPERAZINE MALEATE 5 MG
5 TABLET ORAL EVERY 6 HOURS PRN
COMMUNITY
End: 2020-05-20

## 2019-12-21 RX ORDER — HYDROXYZINE HYDROCHLORIDE 25 MG/1
25 TABLET, FILM COATED ORAL EVERY 4 HOURS PRN
Status: DISCONTINUED | OUTPATIENT
Start: 2019-12-21 | End: 2020-01-02 | Stop reason: HOSPADM

## 2019-12-21 RX ORDER — BISACODYL 10 MG
10 SUPPOSITORY, RECTAL RECTAL DAILY PRN
Status: DISCONTINUED | OUTPATIENT
Start: 2019-12-21 | End: 2020-01-02 | Stop reason: HOSPADM

## 2019-12-21 RX ORDER — CHOLECALCIFEROL (VITAMIN D3) 1250 MCG
50000 CAPSULE ORAL
Status: DISCONTINUED | OUTPATIENT
Start: 2019-12-21 | End: 2019-12-21

## 2019-12-21 RX ORDER — PALIPERIDONE 9 MG/1
9 TABLET, EXTENDED RELEASE ORAL EVERY MORNING
Status: DISCONTINUED | OUTPATIENT
Start: 2019-12-21 | End: 2019-12-21

## 2019-12-21 RX ORDER — BENZONATATE 100 MG/1
100 CAPSULE ORAL 3 TIMES DAILY PRN
Status: DISCONTINUED | OUTPATIENT
Start: 2019-12-21 | End: 2020-01-02 | Stop reason: HOSPADM

## 2019-12-21 RX ORDER — GABAPENTIN 300 MG/1
600 CAPSULE ORAL 3 TIMES DAILY
Status: DISCONTINUED | OUTPATIENT
Start: 2019-12-21 | End: 2019-12-21

## 2019-12-21 RX ORDER — GUAIFENESIN AND DEXTROMETHORPHAN HYDROBROMIDE 600; 30 MG/1; MG/1
1 TABLET, EXTENDED RELEASE ORAL EVERY 12 HOURS
Status: DISCONTINUED | OUTPATIENT
Start: 2019-12-21 | End: 2019-12-21

## 2019-12-21 RX ORDER — IPRATROPIUM BROMIDE AND ALBUTEROL SULFATE 2.5; .5 MG/3ML; MG/3ML
1 SOLUTION RESPIRATORY (INHALATION) EVERY 6 HOURS PRN
Status: DISCONTINUED | OUTPATIENT
Start: 2019-12-21 | End: 2020-01-02 | Stop reason: HOSPADM

## 2019-12-21 RX ORDER — DEXTROSE MONOHYDRATE 25 G/50ML
25-50 INJECTION, SOLUTION INTRAVENOUS
Status: DISCONTINUED | OUTPATIENT
Start: 2019-12-21 | End: 2020-01-02 | Stop reason: HOSPADM

## 2019-12-21 RX ORDER — AMANTADINE HYDROCHLORIDE 100 MG/1
100 CAPSULE, GELATIN COATED ORAL 2 TIMES DAILY
Status: DISCONTINUED | OUTPATIENT
Start: 2019-12-21 | End: 2020-01-02 | Stop reason: HOSPADM

## 2019-12-21 RX ORDER — AMOXICILLIN 250 MG
1 CAPSULE ORAL 2 TIMES DAILY PRN
COMMUNITY
End: 2020-05-20

## 2019-12-21 RX ORDER — TRAZODONE HYDROCHLORIDE 50 MG/1
50 TABLET, FILM COATED ORAL
Status: DISCONTINUED | OUTPATIENT
Start: 2019-12-21 | End: 2020-01-02 | Stop reason: HOSPADM

## 2019-12-21 RX ORDER — PROCHLORPERAZINE MALEATE 5 MG
5 TABLET ORAL EVERY 6 HOURS PRN
Status: DISCONTINUED | OUTPATIENT
Start: 2019-12-21 | End: 2020-01-02 | Stop reason: HOSPADM

## 2019-12-21 RX ORDER — SUMATRIPTAN 25 MG/1
25 TABLET, FILM COATED ORAL
COMMUNITY
End: 2020-05-20

## 2019-12-21 RX ORDER — LOSARTAN POTASSIUM 50 MG/1
50 TABLET ORAL DAILY
Status: DISCONTINUED | OUTPATIENT
Start: 2019-12-21 | End: 2020-01-02 | Stop reason: HOSPADM

## 2019-12-21 RX ORDER — POLYETHYLENE GLYCOL 3350 17 G/17G
17 POWDER, FOR SOLUTION ORAL DAILY PRN
Status: DISCONTINUED | OUTPATIENT
Start: 2019-12-21 | End: 2020-01-02 | Stop reason: HOSPADM

## 2019-12-21 RX ORDER — CLONAZEPAM 0.5 MG/1
0.5 TABLET ORAL AT BEDTIME
Status: DISCONTINUED | OUTPATIENT
Start: 2019-12-21 | End: 2020-01-02 | Stop reason: HOSPADM

## 2019-12-21 RX ORDER — ALBUTEROL SULFATE 90 UG/1
2 AEROSOL, METERED RESPIRATORY (INHALATION) EVERY 6 HOURS PRN
Status: ON HOLD | COMMUNITY
End: 2019-12-21

## 2019-12-21 RX ORDER — SERTRALINE HYDROCHLORIDE 100 MG/1
200 TABLET, FILM COATED ORAL DAILY
Status: DISCONTINUED | OUTPATIENT
Start: 2019-12-21 | End: 2020-01-02 | Stop reason: HOSPADM

## 2019-12-21 RX ORDER — GABAPENTIN 300 MG/1
300 CAPSULE ORAL 3 TIMES DAILY
Status: DISCONTINUED | OUTPATIENT
Start: 2019-12-21 | End: 2019-12-27

## 2019-12-21 RX ORDER — ATORVASTATIN CALCIUM 80 MG/1
80 TABLET, FILM COATED ORAL DAILY
Status: DISCONTINUED | OUTPATIENT
Start: 2019-12-21 | End: 2020-01-02 | Stop reason: HOSPADM

## 2019-12-21 RX ORDER — AMOXICILLIN 250 MG
1 CAPSULE ORAL 2 TIMES DAILY PRN
Status: DISCONTINUED | OUTPATIENT
Start: 2019-12-21 | End: 2020-01-02 | Stop reason: HOSPADM

## 2019-12-21 RX ORDER — DIPHENHYDRAMINE HCL 25 MG
25 CAPSULE ORAL 2 TIMES DAILY
Status: DISCONTINUED | OUTPATIENT
Start: 2019-12-21 | End: 2019-12-21

## 2019-12-21 RX ORDER — ACETAMINOPHEN 325 MG/1
650 TABLET ORAL EVERY 4 HOURS PRN
Status: DISCONTINUED | OUTPATIENT
Start: 2019-12-21 | End: 2020-01-02 | Stop reason: HOSPADM

## 2019-12-21 RX ORDER — IBUPROFEN 200 MG
200 TABLET ORAL EVERY 8 HOURS PRN
Status: DISCONTINUED | OUTPATIENT
Start: 2019-12-21 | End: 2020-01-02 | Stop reason: HOSPADM

## 2019-12-21 RX ORDER — SUMATRIPTAN 25 MG/1
25 TABLET, FILM COATED ORAL
Status: DISCONTINUED | OUTPATIENT
Start: 2019-12-21 | End: 2020-01-02 | Stop reason: HOSPADM

## 2019-12-21 RX ORDER — TOPIRAMATE 50 MG/1
50 TABLET, FILM COATED ORAL AT BEDTIME
Status: DISCONTINUED | OUTPATIENT
Start: 2019-12-21 | End: 2020-01-02 | Stop reason: HOSPADM

## 2019-12-21 RX ORDER — ZINC OXIDE
OINTMENT (GRAM) TOPICAL DAILY PRN
Status: DISCONTINUED | OUTPATIENT
Start: 2019-12-21 | End: 2020-01-02 | Stop reason: HOSPADM

## 2019-12-21 RX ORDER — PALIPERIDONE 9 MG/1
9 TABLET, EXTENDED RELEASE ORAL AT BEDTIME
Status: DISCONTINUED | OUTPATIENT
Start: 2019-12-21 | End: 2020-01-02 | Stop reason: HOSPADM

## 2019-12-21 RX ORDER — METOPROLOL SUCCINATE 25 MG/1
25 TABLET, EXTENDED RELEASE ORAL DAILY
Status: DISCONTINUED | OUTPATIENT
Start: 2019-12-21 | End: 2020-01-02 | Stop reason: HOSPADM

## 2019-12-21 RX ORDER — ALUMINA, MAGNESIA, AND SIMETHICONE 2400; 2400; 240 MG/30ML; MG/30ML; MG/30ML
30 SUSPENSION ORAL EVERY 4 HOURS PRN
Status: DISCONTINUED | OUTPATIENT
Start: 2019-12-21 | End: 2020-01-02 | Stop reason: HOSPADM

## 2019-12-21 RX ORDER — BENZTROPINE MESYLATE 1 MG/1
1 TABLET ORAL 2 TIMES DAILY
Status: DISCONTINUED | OUTPATIENT
Start: 2019-12-21 | End: 2019-12-21

## 2019-12-21 RX ADMIN — TOPIRAMATE 50 MG: 50 TABLET ORAL at 21:44

## 2019-12-21 RX ADMIN — METOPROLOL SUCCINATE 25 MG: 25 TABLET, EXTENDED RELEASE ORAL at 09:02

## 2019-12-21 RX ADMIN — CLONAZEPAM 0.5 MG: 0.5 TABLET ORAL at 21:43

## 2019-12-21 RX ADMIN — SERTRALINE HYDROCHLORIDE 200 MG: 100 TABLET ORAL at 09:02

## 2019-12-21 RX ADMIN — ATORVASTATIN CALCIUM 80 MG: 80 TABLET, FILM COATED ORAL at 09:02

## 2019-12-21 RX ADMIN — NYSTATIN: 100000 CREAM TOPICAL at 09:08

## 2019-12-21 RX ADMIN — ALUMINUM HYDROXIDE, MAGNESIUM HYDROXIDE, AND DIMETHICONE 30 ML: 400; 400; 40 SUSPENSION ORAL at 07:04

## 2019-12-21 RX ADMIN — INSULIN GLARGINE 30 UNITS: 100 INJECTION, SOLUTION SUBCUTANEOUS at 22:08

## 2019-12-21 RX ADMIN — GABAPENTIN 600 MG: 300 CAPSULE ORAL at 09:02

## 2019-12-21 RX ADMIN — PALIPERIDONE 9 MG: 9 TABLET, EXTENDED RELEASE ORAL at 21:44

## 2019-12-21 RX ADMIN — GABAPENTIN 300 MG: 300 CAPSULE ORAL at 13:13

## 2019-12-21 RX ADMIN — GABAPENTIN 300 MG: 300 CAPSULE ORAL at 21:42

## 2019-12-21 RX ADMIN — Medication 1800 MG: at 09:04

## 2019-12-21 RX ADMIN — LOSARTAN POTASSIUM 50 MG: 50 TABLET, FILM COATED ORAL at 09:02

## 2019-12-21 RX ADMIN — TOPIRAMATE 50 MG: 50 TABLET ORAL at 09:02

## 2019-12-21 RX ADMIN — NALTREXONE HYDROCHLORIDE 50 MG: 50 TABLET, FILM COATED ORAL at 09:02

## 2019-12-21 RX ADMIN — PALIPERIDONE 9 MG: 9 TABLET, EXTENDED RELEASE ORAL at 09:02

## 2019-12-21 RX ADMIN — DIPHENHYDRAMINE HYDROCHLORIDE 25 MG: 25 CAPSULE ORAL at 09:05

## 2019-12-21 RX ADMIN — FAMOTIDINE 40 MG: 20 TABLET ORAL at 21:43

## 2019-12-21 RX ADMIN — AMANTADINE HYDROCHLORIDE 100 MG: 100 CAPSULE ORAL at 21:42

## 2019-12-21 RX ADMIN — ASPIRIN 81 MG: 81 TABLET ORAL at 09:02

## 2019-12-21 RX ADMIN — GUAIFENESIN AND DEXTROMETHORPHAN HYDROBROMIDE 1 TABLET: 600; 30 TABLET, EXTENDED RELEASE ORAL at 09:05

## 2019-12-21 RX ADMIN — LOPERAMIDE HYDROCHLORIDE 2 MG: 2 CAPSULE ORAL at 15:10

## 2019-12-21 RX ADMIN — BENZTROPINE MESYLATE 1 MG: 1 TABLET ORAL at 09:02

## 2019-12-21 RX ADMIN — INSULIN ASPART 20 UNITS: 100 INJECTION, SOLUTION INTRAVENOUS; SUBCUTANEOUS at 09:06

## 2019-12-21 RX ADMIN — Medication 10 MG: at 21:44

## 2019-12-21 ASSESSMENT — ACTIVITIES OF DAILY LIVING (ADL)
DRESS: SCRUBS (BEHAVIORAL HEALTH)
DRESS: SCRUBS (BEHAVIORAL HEALTH);INDEPENDENT
ORAL_HYGIENE: INDEPENDENT
LAUNDRY: UNABLE TO COMPLETE
HYGIENE/GROOMING: INDEPENDENT
HYGIENE/GROOMING: INDEPENDENT
ORAL_HYGIENE: INDEPENDENT

## 2019-12-21 NOTE — H&P
"DATE OF ADMISSION: 12/20/2019                                     PATIENT'S [id number]   DATE OF SERVICE: 12/21/2019                                           PATIENT'S: 1960  ADMITTING PROVIDER: Wade Siddiqi MD  ATTENDING PROVIDER: Dede CORDOVA CNP  LEGAL STATUS:  Voluntary  SOURCES OF INFORMATION: Information was obtained from the patient and available records.  CHIEF COMPLAIN: \"I tried to hurt myself\".  HISTORY F PRESENT ILLNESS: Nena Tang is a 59 year old female with history of schizoaffective disorder, polysubstance abuse, type 2 diabetes, sleep apnea, and borderline intellectual functioning.  The patient went to the emergency room for evaluation of increased depression, and suicidal ideation.  The patient lives in a group home.  Reported having increasing thoughts of cutting or overdosing on insulin as a suicide attempt.  The patient reported that for several days, she has been taking extra insulin \"as a practice attempt\".  She reported having auditory hallucinations of seeing a man in in [place] and red eyes telling her to kill herself if she uses her CPAP machines.  She stopped using the CPAP.  She reported that the holidays are difficult for her as she lost multiple family members around this time of the year.  The patient is a poor historian.  She confirmed some of the information from the previous paragraph.  The patient stated that she tried to  harm herself by taking more insulin, because she wanted to feel bad.  The patient reported that she has been seeing a man with a black [place] and red eyes in her room in the evening telling her to harm herself if she is using the CPAP.  She has been having the hallucination for 3 years.  She believes it is the devil talking to her.  She also reports seeing other \"guys\" urging her to harm self.  She is also reporting auditory hallucinations of the same nature.  The patient was not able to answer a lot of questions.  States that she is depressed " but was not able to come up with a specific problems she would like to work on.  States that that she is not sleeping at night because she is afraid.  She is not taking naps during the day either.  She spends her day going to the drop-in center where she is up until 4 PM.  The patient states that her energy is low.  Still has suicidal thoughts but feels safe on the unit.  She was not able to recall current or past history of  anxiety, panic attacks, or manic symptoms.  Denies PTSD.  Denies OCD, and eating disorders.  Does not remember being diagnosed with borderline personality disorder.  Reports 4 suicide attempts by cutting, overdosing on medications, drugs and alcohol, and jumping out of a window.  Denies self injury behaviors.Denies seizures, head injuries, and loss of consciousness.  SUBSTANCE USE HISTORY:   The patient has a history of alcohol and drug abuse.  She has been sober from alcohol for 27 years.  She has used cocaine in the past and has been sober over 30 years.  She has been in treatment however, does not remember the details.    PSYCHIATRIC HISTORY:   The patient has a history of schizoaffective disorder, bipolar type, and polysubstance abuse.  She reports about 6 or 7 admissions in her lifetime, the last one couple of years ago.  Does not remember where she was.  Does not know any of her medications.  Denies history of ECT.  Denies history of court commitments.  She does have a psychiatrist and a therapist that she sees once or twice a month.  PAST MEDICAL HISTORY:   Past Medical History:   Diagnosis Date     Acute respiratory failure with hypoxia (H) 9/4/2017     CAD (coronary artery disease)     5/2014 cath, nonbostructive stenosis to LAD, RCA.     Chronic low back pain 1/22/2013     Cocaine abuse, in remission (H)      Fecal urgency 3/8/2012     History of heroin abuse (H)      Hyperlipidemia LDL goal <100 10/31/2010     Hypertension 7/29/2013     Illiterate 8/30/2011     Irritable bowel  syndrome      Left cataract      Migraine 4/19/2012     Migraine headache 4/22/2013     Moderate major depression (H) 6/8/2011     Noncompliance with medication regimen 6/8/2011     Obesity      CINDY (obstructive sleep apnea) 3/8/2012    uses CPAP     Osteopenia 10/7/2009     Pneumonia of right lower lobe due to infectious organism (H) 9/4/2017     Schizoaffective disorder, depressive type (H) 2/25/2013     Sepsis (H) 8/29/2017     Suicidal intent 10/2/2013     Takotsubo cardiomyopathy      Type 2 diabetes mellitus (H) 8/30/2011     Uterine cancer (H) 1983     Verbal auditory hallucination 10/4/2012       Past Surgical History:   Procedure Laterality Date     C OOPHORECTORMY FOR MALIG, W/BX  1983    UTERINE     CATARACT IOL, RT/LT Bilateral 2017     COLONOSCOPY N/A 3/16/2017    Procedure: COLONOSCOPY;  Surgeon: Traci Gonzalez MD;  Location:  GI     Coronary CTA  5/21/2014     HYSTERECTOMY  1983    uterine cancer yearly pap's per provider.     LAPAROSCOPIC CHOLECYSTECTOMY  2008     PHACOEMULSIFICATION CLEAR CORNEA WITH STANDARD INTRAOCULAR LENS IMPLANT Left 5/5/2017    Procedure: PHACOEMULSIFICATION CLEAR CORNEA WITH STANDARD INTRAOCULAR LENS IMPLANT;  LEFT EYE PHACOEMULSIFICATION CLEAR CORNEA WITH STANDARD INTRAOCULAR LENS IMPLANT ;  Surgeon: Tyra Diaz MD;  Location: [facility]     PHACOEMULSIFICATION CLEAR CORNEA WITH STANDARD INTRAOCULAR LENS IMPLANT Right 6/30/2017    Procedure: PHACOEMULSIFICATION CLEAR CORNEA WITH STANDARD INTRAOCULAR LENS IMPLANT;  RIGHT EYE PHACOEMULSIFICATION CLEAR CORNEA WITH STANDARD INTRAOCULAR LENS IMPLANT;  Surgeon: Tyra Diaz MD;  Location:  EC     RELEASE TRIGGER FINGER  10/11/2012    Left thumb. Procedure: RELEASE TRIGGER FINGER;  LEFT THUMB TRIGGER RELEASE;  Surgeon: Tay Langley MD;  Location:  SD     RELEASE TRIGGER FINGER Right 12/26/2016    Procedure: RELEASE TRIGGER FINGER;  Surgeon: Albino Castañeda MD;  Location:  OR       ALLERGIES:     Allergies   Allergen Reactions     Imidazole Antifungals Hives     Tolerates diflucan     Ketoprofen Itching     Pruritis to topical     Lisinopril Hives     Metformin Other (See Comments)     Patient hospitalized for lactic acidosis - admitting provider suspectd caused by metformin     Metronidazole Hives     Posaconazole Hives     Tolerates diflucan     FAMILY HISTORY:  The patient reports that 1 of her sisters had schizophrenia and she completed suicide about 7 years ago.  The patient does not know details.  Family History   Problem Relation Age of Onset     Cancer Mother         BLADDER     Respiratory Mother         COPD     Gastrointestinal Disease Mother         CIRRHOSIS OF LI BOLIVAR     Alcohol/Drug Mother      Diabetes Mother      Hypertension Mother      Lipids Mother      C.A.D. Mother      Glaucoma Mother      Alcohol/Drug Sister      Mental Illness Sister      Alcohol/Drug Sister      Psychotic Disorder Sister      Cancer Maternal Grandmother         UNKNOWN TYPE     Cancer Brother         COLON     Cancer - colorectal Brother         IN HIS LATE 30S     Alcohol/Drug Brother          OF HEROIN OVERDOSE AT AGE 22 YRS     Macular Degeneration No family hx of        SOCIAL HISTORY:     The patient grew up in [place].  She moved to [place] about 35 years ago.  She has been a  for about 5 years.  She has 2 sons and 7 grandchildren.  Her parents are both .  She has 2 brothers and 2 sisters that passed away.  She has 2 sisters that are alive.  Currently lives in a group home and has been there for about 3 years.  Educational history includes completing 12th grade.  She had worked as a  at [employer] up until 5 years ago.  She has been on Social Security disability since then.   MEDICAL REVIEW OF SYSTEM: Please refer to the review of systems done by Otoniel Hook MD on 19, which I reviewed and confirmed.   MEDICATIONS PRIOR TO ADMISSION:   Prior to Admission medications     Medication Sig Start Date End Date Taking? Authorizing Provider   albuterol (PROAIR HFA/PROVENTIL HFA/VENTOLIN HFA) 108 (90 Base) MCG/ACT inhaler Inhale 2 puffs into the lungs every 6 hours as needed for shortness of breath / dyspnea or wheezing   Yes Reported, Patient   diclofenac (VOLTAREN) 1 % topical gel Place 4 g onto the skin 4 times daily (with meals and nightly)   Yes Reported, Patient   prochlorperazine (COMPAZINE) 5 MG tablet Take 5 mg by mouth every 6 hours as needed for nausea or vomiting   Yes Reported, Patient   ranitidine (ZANTAC) 300 MG tablet Take 300 mg by mouth At Bedtime   Yes Reported, Patient   senna-docusate (SENOKOT-S/PERICOLACE) 8.6-50 MG tablet Take 1 tablet by mouth 2 times daily as needed for constipation   Yes Reported, Patient   SUMAtriptan (IMITREX) 25 MG tablet Take 25 mg by mouth at onset of headache for migraine   Yes Reported, Patient   aspirin (ASA) 81 MG EC tablet TAKE 1 TABLET (81MG) BY MOUTH DAILY 9/24/19   Rowena Haas APRN CNP   atorvastatin (LIPITOR) 80 MG tablet TAKE 1 TABLET (80MG) BY MOUTH DAILY 7/2/19   Rowena Haas APRN CNP   benzonatate (TESSALON) 100 MG capsule Take 1 capsule (100 mg) by mouth 3 times daily as needed for cough 10/25/19   Adriana Georges APRN CNP   benztropine (COGENTIN) 0.5 MG tablet Take 2 tablets (1 mg) by mouth 2 times daily 10/21/19   Rowena Haas APRN CNP   blood glucose (NO BRAND SPECIFIED) test strip Use to test blood sugar  3 times daily or as directed. To accompany: Blood Glucose Monitor Brands: per insurance. 9/10/19   Rowena Haas APRN CNP   calcium carbonate (OS-ALLEN) 1500 (600 Ca) MG tablet Take 3 tablets (1,800 mg) by mouth daily 10/24/19   Rowena Haas APRN CNP   cholecalciferol (VITAMIN D3) 05565 units (1250 mcg) capsule TAKE 1 CAPSULE (50,000 UNITS) MONTHLY 3/13/19   Bruno, Rebecca R, DO   clonazePAM (KLONOPIN) 0.5 MG tablet Take 1 tablet (0.5 mg) by mouth At Bedtime 12/16/19   Rowena Haas,  APRN CNP   Continuous Blood Gluc Sensor (DEXCOM G5 MOB/G4 PLAT SENSOR) MISC 1 Device every 7 days    Reported, Patient   Continuous Blood Gluc Transmit (DEXCOM G5 MOBILE TRANSMITTER) MISC 1 Device every 3 months    Reported, Patient   dextromethorphan-guaiFENesin (MUCINEX DM)  MG 12 hr tablet Take 1 tablet by mouth every 12 hours 10/25/19   Adriana Georges APRN CNP   diphenhydrAMINE (BENADRYL) 25 MG tablet Take 1 tablet in am and 1 tablet at 2 pm. Until Rash resolves 8/9/19   Rowena Haas APRN CNP   famotidine (PEPCID) 40 MG tablet Take 1 tablet (40 mg) by mouth At Bedtime 12/6/19   Rowena Haas APRN CNP   gabapentin (NEURONTIN) 300 MG capsule Take 2 capsules (600 mg) by mouth 3 times daily 12/3/19   Rowena Haas APRN CNP   guaiFENesin (ROBITUSSIN) 100 MG/5ML liquid Take 10 mLs (200 mg) by mouth nightly as needed for cough 7/31/19   Norma Baldwin MD   ibuprofen (ADVIL/MOTRIN) 200 MG tablet Take 200 mg by mouth every 8 hours as needed for mild pain    Unknown, Entered By History   insulin aspart (NOVOLOG FLEXPEN) 100 UNIT/ML pen Inject 20 Units Subcutaneous 3 times daily (with meals) Once daily, can add additional 5 units if BGs are >500mg/dL.  Hold Insulin if BG are<70. 8/15/19   Rowena Haas APRN CNP   insulin glargine (LANTUS SOLOSTAR PEN) 100 UNIT/ML pen Inject 60 Units Subcutaneous At Bedtime 7/15/19   Rowena Haas APRN CNP   insulin pen needle (NOVOFINE 30) 30G X 8 MM miscellaneous USE 4 DAILY OR AS DIRECTED 11/21/19   Rowena Haas APRN CNP   ipratropium - albuterol 0.5 mg/2.5 mg/3 mL (DUONEB) 0.5-2.5 (3) MG/3ML neb solution Take 1 vial (3 mLs) by nebulization every 6 hours as needed for shortness of breath / dyspnea or wheezing 8/22/18   Adriana Georges APRN CNP   lidocaine (LIDODERM) 5 % patch Place 1 patch onto the skin every 24 hours 7/31/19   Norma Baldwin MD   losartan (COZAAR) 50 MG tablet Take 1 tablet (50 mg) by mouth daily 1/30/19    Rebecca Carr DO   melatonin 5 MG CAPS Take 2 capsules by mouth At Bedtime 1/30/19   Rebecca Carr DO   metoprolol succinate ER (TOPROL-XL) 25 MG 24 hr tablet Take 1 tablet (25 mg) by mouth daily 10/21/19   Rowena Haas APRN CNP   naltrexone (DEPADE/REVIA) 50 MG tablet Take 1/2 tablet once daily 1-2 hours prior to worst cravings for 1 week, then increase to 1 tablet daily as directed if tolerating 11/4/19   Debbie Germain PA-C   nystatin (MYCOSTATIN) 732916 UNIT/GM external cream Apply topically 2 times daily 8/6/19   Rowena Haas APRN CNP   nystatin (MYCOSTATIN) 522276 UNIT/GM external powder Apply topically once as needed for dry skin 6/4/19   Rowena Haas APRN CNP   order for DME Equipment being ordered: Depends. 11/1/19   Rowena Haas APRN CNP   order for DME Equipment being ordered: CPAP Supplies. 8/6/19   Rowena Haas APRN CNP   order for DME Equipment being ordered: [employer] [place] 4/3/19   Rebecca Carr DO   paliperidone ER (INVEGA) 9 MG 24 hr tablet Take 1 tablet (9 mg) by mouth every morning 9/24/19   Rowena Haas APRN CNP   polyethylene glycol (MIRALAX) powder Take 17 g (1 capful) by mouth daily as needed for constipation 8/6/19   Rowena Haas APRN CNP   semaglutide (OZEMPIC, 1 MG/DOSE,) 2 MG/1.5ML pen Inject 1 mg Subcutaneous every 7 days 11/4/19   Debbie Germain PA-C   sertraline (ZOLOFT) 100 MG tablet Take 2 tablets (200 mg) by mouth daily 10/21/19   Rowena Haas APRN CNP   topiramate (TOPAMAX) 50 MG tablet Take 1 tablet (50 mg) by mouth 2 times daily 12/3/19   Rowena Haas APRN CNP   zinc oxide (DESITIN) 40 % external ointment Apply topically as needed for dry skin or irritation 10/25/19   Adriana Georges, ART CNP     LABORATORY DATA:   Recent Results (from the past 672 hour(s))   Hemoglobin A1c    Collection Time: 12/03/19 11:43 AM   Result Value Ref Range    Hemoglobin A1C 6.2 (H) 0 - 5.6 %    Comprehensive metabolic panel (BMP + Alb, Alk Phos, ALT, AST, Total. Bili, TP)    Collection Time: 12/03/19 11:43 AM   Result Value Ref Range    Sodium 139 133 - 144 mmol/L    Potassium 4.1 3.4 - 5.3 mmol/L    Chloride 110 (H) 94 - 109 mmol/L    Carbon Dioxide 21 20 - 32 mmol/L    Anion Gap 8 3 - 14 mmol/L    Glucose 146 (H) 70 - 99 mg/dL    Urea Nitrogen 16 7 - 30 mg/dL    Creatinine 0.91 0.52 - 1.04 mg/dL    GFR Estimate 69 >60 mL/min/[1.73_m2]    GFR Estimate If Black 80 >60 mL/min/[1.73_m2]    Calcium 8.9 8.5 - 10.1 mg/dL    Bilirubin Total 0.2 0.2 - 1.3 mg/dL    Albumin 3.4 3.4 - 5.0 g/dL    Protein Total 7.7 6.8 - 8.8 g/dL    Alkaline Phosphatase 90 40 - 150 U/L    ALT 26 0 - 50 U/L    AST 15 0 - 45 U/L   Drug Abuse Screen Panel 13, Urine (Pain Care Package)    Collection Time: 12/03/19 12:11 PM   Result Value Ref Range    Cannabinoids (46-cyl-3-carboxy-9-THC) Not Detected NDET^Not Detected ng/mL    Phencyclidine (Phencyclidine) Not Detected NDET^Not Detected ng/mL    Cocaine (Benzoylecgonine) Not Detected NDET^Not Detected ng/mL    Methamphetamine (d-Methamphetamine) Not Detected NDET^Not Detected ng/mL    Opiates (Morphine) Not Detected NDET^Not Detected ng/mL    Amphetamine (d-Amphetamine) Not Detected NDET^Not Detected ng/mL    Benzodiazepines (Nordiazepam) Not Detected NDET^Not Detected ng/mL    Tricyclic Antidepressants (Desipramine) Not Detected NDET^Not Detected ng/mL    Methadone (Methadone) Not Detected NDET^Not Detected ng/mL    Barbiturates (Butalbital) Not Detected NDET^Not Detected ng/mL    Oxycodone (Oxycodone) Not Detected NDET^Not Detected ng/mL    Propoxyphene (Norpropoxyphene) Not Detected NDET^Not Detected ng/mL    Buprenorphine (Buprenorphine) Not Detected NDET^Not Detected ng/mL   Glucose by meter    Collection Time: 12/20/19  4:04 PM   Result Value Ref Range    Glucose 219 (H) 70 - 99 mg/dL   Drug abuse screen 6 urine (chem dep)    Collection Time: 12/20/19  4:19 PM   Result Value Ref  "Range    Amphetamine Qual Urine Negative NEG^Negative    Barbiturates Qual Urine Negative NEG^Negative    Benzodiazepine Qual Urine Negative NEG^Negative    Cannabinoids Qual Urine Negative NEG^Negative    Cocaine Qual Urine Negative NEG^Negative    Ethanol Qual Urine Negative NEG^Negative    Opiates Qualitative Urine Negative NEG^Negative   Glucose by meter    Collection Time: 12/20/19  8:56 PM   Result Value Ref Range    Glucose 110 (H) 70 - 99 mg/dL   Glucose by meter    Collection Time: 12/20/19 11:13 PM   Result Value Ref Range    Glucose 190 (H) 70 - 99 mg/dL   Glucose by meter    Collection Time: 12/21/19  2:05 AM   Result Value Ref Range    Glucose 177 (H) 70 - 99 mg/dL   Glucose by meter    Collection Time: 12/21/19  7:45 AM   Result Value Ref Range    Glucose 148 (H) 70 - 99 mg/dL   Glucose by meter    Collection Time: 12/21/19 11:09 AM   Result Value Ref Range    Glucose 134 (H) 70 - 99 mg/dL   Glucose by meter    Collection Time: 12/21/19 12:26 PM   Result Value Ref Range    Glucose 123 (H) 70 - 99 mg/dL     PHYSICAL EXAMINATON:   Temp: 97.2  F (36.2  C) Temp src: Tympanic BP: (!) 157/83 Pulse: 83   Resp: 16 SpO2: 95 % O2 Device: None (Room air)    5' 0\" 239 lbs 4.8 oz Body mass index is 46.74 kg/m .  MENTAL STATUS EXAM: The patient is a very pleasant, morbidly obese, petite, -American female who is using a walker to navigate.  She is slow to respond.  She is pleasant and cooperative the best she can.  Eye contact is good, mood is depressed, affect is mood congruent, speech is slow and delayed, psychomotor behavior is positive for retardation, thought process is logical and goal oriented, normal associations, thought content is negative for suicidal homicidal ideation, paranoia, and delusions, positive for auditory and visual hallucinations urging her to harm herself, insight and judgment are fair, she is oriented to self, date, place, situation, attention span and concentration are fair, recent " "and remote memory fair, she has no problems expressing herself, and fund of knowledge is adequate for the level of education and training.    DIAGNOSIS:  1.  Schizoaffective disorder, bipolar type, currently depressed, with psychosis  2.  Borderline intellectual functioning  3.  Polysubstance abuse  4.  Medical problems include morbid obesity, type 2 diabetes, sleep apnea, multiple pain issues.  PLAN AND RECOMMENDATIONS: The patient is a very pleasant, -American female who was admitted with increased depression, auditory visual hallucinations, and suicidal thoughts with a plan to overdose on her insulin.  Reported having a \"suicide trials\" by injecting herself with more insulin than she needs.  Reports that her medications are administered by the staff however, she is managing her insulin.  The patient was not able to identify ago.  Reports that the auditory visual hallucinations have been going on for the last 3 years.  She has been having intrusive thoughts of suicide.  She feels safe on the unit.  The patient was not able to discuss her medications since she does not know what she is taking.  Medication changes will include the following: Discontinue Cogentin.  Start amantadine 100 mg twice a day.  Decrease gabapentin to 300 mg 3 times a day to reduce sedation.  Change Invega to 9 mg at bedtime.  Discontinue the morning dose of Topamax.  Continue Topamax 2 mg at bedtime. continue Klonopin 0.5 mg at bedtime.  Continue melatonin 10 mg at bedtime.  Discontinue naltrexone, the patient has been sober for 27 years.  Continue Zoloft 200 mg every morning.  Blood work was reviewed.  Internal medicine follow-up for medical problems.  Estimated length of stay 5 to 7 days.  Disposition, to home.  The patient was consulted on nature of illness and treatment options. Care was coordinated with the treatment team.  Attestation: Patient has been seen and evaluated by marisela CORDOVA CNP  12/21/2019  2:12 " "PM  This note was created with the help of Dragon dictation system. All grammatical/typing errors or context distortion are unintentional and inherent to software.  \"  "

## 2019-12-21 NOTE — PROGRESS NOTES
12/20/19 6946   Patient Belongings   Did you bring any home meds/supplements to the hospital?  No   Patient Belongings other (see comments)   Patient Belongings Remaining with Patient walker   Patient Belongings Put in Hospital Secure Location (Security or Locker, etc.) other (see comments)   Belongings Search Yes   Clothing Search Yes   Second Staff Leif Abdi     LOCKER:   coat, shoes, sweatpants, socks, hat, gloves, cell phone, $10 bill, cross on chain necklace  A               Admission:  I am responsible for any personal items that are not sent to the safe or pharmacy.  [facility] is not responsible for loss, theft or damage of any property in my possession.  Dec. 30-  Patients visitor brought in one purse with a phone  in it and three pouches insie the purse containing assorted business cards, loose change. Placed in patient locker and papers.  Signature:  _________________________________ Date: _______  Time: _____                                              Staff Signature:  ____________________________ Date: ________  Time: _____      2nd Staff person, if patient is unable/unwilling to sign:    Signature: ________________________________ Date: ________  Time: _____   Discharge:  [facility] has returned all of my personal belongings:    Signature: _________________________________ Date: ________  Time: _____                                          Staff Signature:  ____________________________ Date: ________  Time: _____

## 2019-12-21 NOTE — PROGRESS NOTES
"SPIRITUAL HEALTH SERVICES  [facility]  ON-CALL VISIT     REFERRAL SOURCE: Pt request for Spiritual Support.    Nena stated \"I could be better if I stopped grieving. I grieve too much and then I want to hurt myself.\" She states that she is grieving the deaths of \"2 brothers, my , my Mom and 2 sisters. My sister just  about a year ago.\" Nena began to tear up as she talked. She states that she has 2 sons Scot and Hal but does not ask them to visit. She has 7 grandchildren who bring her ronal and a smile to her face.    We shared about the journey of grief, memories of loved ones and how to learn to live with grief. She states she lives in a group home and attends a [employer] \"every .\"  She loved to go to [employer] with her Mom, an activity Nena continues. And, she loved to fish with her . These events brought smiles to her face. She then stated \"maybe it's time to give up their ashes and let them go.\"    Provided listening and reflective conversation that gave space for Nena to share her grief, honor her memories, reflect on the ronal she has in this world, and ways that she might continue her life forward.       PLAN: Chaplains remain available per pt/family/staff request.     Rev. Erin Diaz MDiv, [facility]  Staff    Pager [phone]  * [facility] remains available  for emergent requests/referrals, either by having the switchboard page the on-call  or by entering an ASAP/STAT consult in Epic (this will also page the on-call ).*          "

## 2019-12-21 NOTE — PLAN OF CARE
"Nena had an episode of dizziness, nausea & vomiting at 1100, vital signs and blood sugar within normal limits. Offered gingerale. Many medication changes are being done to decrease doses. Nursing will continue to monitor vitals and blood glucose. No other episodes of dizziness or emesis this shift; vitals stable. Pt eating & drinking some fluids.  Pt has had 2 episodes of loose stools, the last time not quite making it to toilet. Imodium ordered. Watch for s/s of infection. Push fluids.    Pt is poor historian, so call was made to group [facility]\", spoke with Marilynn at [phone]. She relays pt has chromic \"gastritis\" daily, \"mainly in the morning\" which usually subsides with eating breakfast. She does have some dizziness at times and chart review shows an ED visit on 11/11/19 for a fall at the , although pt denied recent falls to ED and 3B staff.     There is no documentation or diagnosis anywhere, but Marilynn verifies that pt does have significant \"cognitive delay, is illiterate, cannot understand or follow 2-3 step directions. But she is very I independent with self care, except for needing assist of 1 for showering due to weight gain & bilateral shoulder pain [gets steroid injections].\"       staff administer oral medication only. Pt self-administers insulin via pen and staff supervises. She shows staff how many units she will give herself and they observe her injecting. She is also free to be in the community, and takes her insulin pen with her. Therefore, staff cannot always verify what isulin dose she is givening herself. They states that numerous times in past few months, her blood glucose has been in the 50's. Pt does acknowledge today that she purposely gives herself too much insulin sometimes as a way to \"hurt myself.\" She does say she has things to live for, including her 7 grandchildren.    Marilynn states  \"cannot take [facility] back because they are not licensed to administer injectables and she " "is not safe there.\"  to assess.    She pleasant & cooperative, but very somnolent today. Denies depression or SI stating, \"I'm just tired & feel weak.\" She has EPSE of restless legs & involuntary tongue and mouth movements. Continue to check for s/s infection and push fluids.  "

## 2019-12-21 NOTE — PHARMACY-ADMISSION MEDICATION HISTORY
Admission medication history interview status for the 12/20/2019 admission is complete. See Epic admission navigator for allergy information, pharmacy, prior to admission medications and immunization status.     Medication history interview sources:  [facility] ([phone]), Roxanne dispense history    Outside pharmacy: [facility]    Changes made to PTA medication list (reason)  Added: N/A  Deleted: Per MAR and dispense history - Mucinex (completed), diphenhydramine (completed), Robitussin (completed), ibuprofen (completed), nystatin (completed), lidocaine patch (completed), famotidine (temporary switch from ranitidine due to shortage), diclofenac gel (not listed on MAR), Miralax (not listed on MAR), zinc oxide ointment (not listed on MAR), albuterol inhaler (patient doesn't use inhalers, had insurance coverage problems), DuoNebs (patient doesn't use nebulizers, had insurance coverage problems)  Changed: N/A    Additional medication history information (including reliability of information, actions taken by pharmacist):  - Reliability: good. Medication list up-to-date per group home MAR. PRN medications were not listed on MAR which is why the last doses are unknown.  - Adherence: great. Medications administered by [facility] staff.    [place] :  12/16/2019 Clonazepam 0.5 Mg Tablet #21 21 days supply [facility]  12/04/2019 Gabapentin 300 Mg Capsule #180 30 days supply [facility]      Prior to Admission medications    Medication Sig Last Dose Taking? Auth Provider   aspirin (ASA) 81 MG EC tablet TAKE 1 TABLET (81MG) BY MOUTH DAILY 12/20/2019 at 0800 Yes Rowena Haas APRN CNP   atorvastatin (LIPITOR) 80 MG tablet TAKE 1 TABLET (80MG) BY MOUTH DAILY 12/20/2019 at 0800 Yes Rowena Haas APRN CNP   benztropine (COGENTIN) 0.5 MG tablet Take 2 tablets (1 mg) by mouth 2 times daily 12/20/2019 at 0800 Yes Rowena Haas APRN CNP   calcium carbonate (OS-ALLEN) 1500 (600 Ca) MG  tablet Take 3 tablets (1,800 mg) by mouth daily 12/20/2019 at 0800 Yes Rowena Haas APRN CNP   gabapentin (NEURONTIN) 300 MG capsule Take 2 capsules (600 mg) by mouth 3 times daily 12/20/2019 at 0800 Yes Rowena Haas APRN CNP   insulin aspart (NOVOLOG FLEXPEN) 100 UNIT/ML pen Inject 20 Units Subcutaneous 3 times daily (with meals) Once daily, can add additional 5 units if BGs are >500mg/dL.  Hold Insulin if BG are<70. 12/20/2019 at 0800 Yes Rowena Haas APRN CNP   losartan (COZAAR) 50 MG tablet Take 1 tablet (50 mg) by mouth daily 12/20/2019 at 0800 Yes Rebecca Carr DO   metoprolol succinate ER (TOPROL-XL) 25 MG 24 hr tablet Take 1 tablet (25 mg) by mouth daily 12/20/2019 at 0800 Yes Rowena Haas APRN CNP   paliperidone ER (INVEGA) 9 MG 24 hr tablet Take 1 tablet (9 mg) by mouth every morning 12/20/2019 at 0800 Yes Rowena Haas APRN CNP   ranitidine (ZANTAC) 300 MG tablet Take 300 mg by mouth At Bedtime 12/19/2019 at 2000 Yes Reported, Patient   sertraline (ZOLOFT) 100 MG tablet Take 2 tablets (200 mg) by mouth daily 12/20/2019 at 0800 Yes Rowena Haas APRN CNP   topiramate (TOPAMAX) 50 MG tablet Take 1 tablet (50 mg) by mouth 2 times daily 12/20/2019 at 0800 Yes Rowena Haas APRN CNP   blood glucose (NO BRAND SPECIFIED) test strip Use to test blood sugar  3 times daily or as directed. To accompany: Blood Glucose Monitor Brands: per insurance.   Rowena Haas APRN CNP   cholecalciferol (VITAMIN D3) 63383 units (1250 mcg) capsule TAKE 1 CAPSULE (50,000 UNITS) MONTHLY 11/20/2019  Rebecca Carr DO   clonazePAM (KLONOPIN) 0.5 MG tablet Take 1 tablet (0.5 mg) by mouth At Bedtime 12/19/2019 at 2000  Haas, Rowena N, APRN CNP   Continuous Blood Gluc Sensor (DEXCOM G5 MOB/G4 PLAT SENSOR) MISC 1 Device every 7 days   Reported, Patient   Continuous Blood Gluc Transmit (DEXCOM G5 MOBILE TRANSMITTER) MISC 1 Device every 3 months   Reported, Patient   insulin glargine  (LANTUS SOLOSTAR PEN) 100 UNIT/ML pen Inject 60 Units Subcutaneous At Bedtime 12/19/2019 at 2000  Rowena Haas APRN CNP   insulin pen needle (NOVOFINE 30) 30G X 8 MM miscellaneous USE 4 DAILY OR AS DIRECTED   Rowena Haas APRN CNP   melatonin 5 MG CAPS Take 2 capsules by mouth At Bedtime 12/19/2019 at 2000  Rebecca Carr DO   order for DME Equipment being ordered: Depends.   Rowena Haas APRN CNP   order for DME Equipment being ordered: CPAP Supplies.   Rowena Haas APRN CNP   order for DME Equipment being ordered: [employer] [facility] [place]   Rebecca Carr DO   prochlorperazine (COMPAZINE) 5 MG tablet Take 5 mg by mouth every 6 hours as needed for nausea or vomiting Unknown  Reported, Patient   semaglutide (OZEMPIC, 1 MG/DOSE,) 2 MG/1.5ML pen Inject 1 mg Subcutaneous every 7 days 12/18/2019  Debbie Germain PA-C   senna-docusate (SENOKOT-S/PERICOLACE) 8.6-50 MG tablet Take 1 tablet by mouth 2 times daily as needed for constipation Unknown  Reported, Patient   SUMAtriptan (IMITREX) 25 MG tablet Take 25 mg by mouth at onset of headache for migraine Unknown  Reported, Patient         Medication history completed by:   Sabina Gallego, PharmD  PGY1 Pharmacy Practice Resident in Behavioral Health

## 2019-12-21 NOTE — ED PROVIDER NOTES
"  History     Chief Complaint   Patient presents with     Suicidal     Pt reports SI in the past few days and reports, \"I was taking too much insulin a couple days ago.\" Pt denies taking extra insulin today and denies SI at this time.      The history is provided by the patient and medical records (group home staff).     Nena Tang is a 59 year old female who comes in due to her worsening mood.  She lives in a group home due to her schizoaffective disorder.  She has been feeling more depressed and suicidal the last few days.  She has thoughts of either cutting or overdosing on insulin.  A few days ago she took some extra insulin as a practice attempt. She had no sequelae from that.  She also is seeing a man in a red cape with red eye telling her that he will kill her if she used her cpap.  She has stopped using her cpap.  She states the holidays are always hard for her as most of her family is dead so she might as well join them.     Please see the 's assessment in EPIC from today (12/20/19) for further details.    I have reviewed the Medications, Allergies, Past Medical and Surgical History, and Social History in the Epic system.    Review of Systems   Constitutional: Negative for fever.   Eyes: Negative for visual disturbance.   Respiratory: Negative for chest tightness and shortness of breath.    Cardiovascular: Negative for chest pain.   Gastrointestinal: Negative for abdominal pain.   Musculoskeletal: Negative for back pain.   Neurological: Negative for dizziness.   Psychiatric/Behavioral: Positive for dysphoric mood and suicidal ideas. Negative for hallucinations and self-injury. The patient is not nervous/anxious.    All other systems reviewed and are negative.      Physical Exam   BP: 139/75  Pulse: 79  Temp: 98.8  F (37.1  C)  Resp: 16  SpO2: 97 %      Physical Exam  Vitals signs and nursing note reviewed.   Constitutional:       Appearance: Normal appearance. She is well-developed. "   Cardiovascular:      Rate and Rhythm: Normal rate and regular rhythm.      Heart sounds: Normal heart sounds.   Pulmonary:      Effort: Pulmonary effort is normal. No respiratory distress.      Breath sounds: Normal breath sounds.   Neurological:      Mental Status: She is alert and oriented to person, place, and time.   Psychiatric:         Attention and Perception: Attention normal. She perceives auditory and visual hallucinations.         Mood and Affect: Affect normal. Mood is depressed.         Speech: Speech normal.         Behavior: Behavior normal. Behavior is cooperative.         Thought Content: Thought content is not paranoid or delusional. Thought content includes suicidal ideation. Thought content does not include homicidal ideation. Thought content includes suicidal plan. Thought content does not include homicidal plan.         Cognition and Memory: Cognition and memory normal.         Judgment: Judgment normal.      Comments: Nena is a 60 y/o female who looks her age.  She is well groomed with good eye contact.          ED Course        Procedures               Labs Ordered and Resulted from Time of ED Arrival Up to the Time of Departure from the ED   GLUCOSE BY METER - Abnormal; Notable for the following components:       Result Value    Glucose 219 (*)     All other components within normal limits   GLUCOSE MONITOR NURSING POCT   DRUG ABUSE SCREEN 6 CHEM DEP URINE ([facility])            Assessments & Plan (with Medical Decision Making)   Nena will be admitted to the hospital due to her worsening depression with suicidal thoughts and a plan.  She has done some small attempts in the last day to practice her plan to overdose on insulin.  She also has visual and auditory hallucinations.  She will go to station 3b under Dr. Siddiqi.    I have reviewed the nursing notes.    I have reviewed the findings, diagnosis, plan and need for follow up with the patient.    New Prescriptions    No medications on  file       Final diagnoses:   Schizoaffective disorder, bipolar type (H)       12/20/2019   [facility], [place], EMERGENCY DEPARTMENT     Otoniel Hook MD  12/20/19 0863

## 2019-12-21 NOTE — CONSULTS
[facility]  Consult Note - Hospitalist Service       Date of Admission:  12/20/2019     Consult Requested by:  Psychiatry   Reason for Consult:  Medical evaluation      ASSESSMENT & PLAN      Nena Tang is a 59 year old female with history of schizoaffective disorder, MDD, polysubstance abuse (in remission), obesity, CINDY, HTN, HLD, Type II DM, nonobstructive CAD, uterine cancer s/p hysterectomy, migraines, and IBS who was admitted to inpatient psychiatry for suicidal ideation and auditory hallucinations..     1. MDD and Schizoaffective disorder with psychosis and suicidal ideation.  Will defer management to psychiatry team.    2. Type II DM.  Appears relatively well controlled at baseline, last Hgb A1C 6.2% on 12/3/19. PTA regimen includes Glargine, novolg w meals, and semaglutide weekly. Sugars adequately controlled since admission, however poor PO intake concerning for hypoglycemia risk.  - Decrease PTA Glargine to 30 units at bedtime until taking adequate PO  - Hold scheduled novolog as well  - Hold Semaglutide while inpatient  - Start medium intensity sliding scale AC/HS  - Monitor sugars AC/HS and 0200  - Hypoglycemia protocol ordered     3. HTN.  Stable. Continue PTA regimen of Losartan 50mg daily and Metoprolol XL 25mg daily.  Hold parameters set for SBP <110 or HR <55.     4. HLD.  Continue PTA statin.     5. CINDY.  Reportedly non-compliant with CPAP prior to arrival in setting of worsening depression. Unclear if she would be ligature risk.  - Consult RT to start CPAP at night IF approved to have CPAP by psychiatrist    6. IBS.  Reports chronic abdominal pain and loose stools. Currently symptomatic but patient states no change from baseline.   - Monitor    7. Migraines.  Currently asymptomatic. Continue PTA topamax (? Prophylaxis) and Imitrex prn.       The patient's care was discussed with the Primary team.    Ryan Montoya PA-C  Internal Medicine,  "[facility] Hospitalist Service  [facility], [place]  Pager [phone]      ______________________________________________________________________      History of Present Illness   Nena Tang is a 59 year old female with history of schizoaffective disorder, MDD, polysubstance abuse (in remission), obesity, CINDY, HTN, HLD, Type II DM, migraines, and IBS who was admitted to inpatient psychiatry for suicidal ideation and auditory hallucinations.    Patient currently resides in a group home. She reports several days of suicidal ideation in setting of worsening depression. She took additional insulin several days ago to see what would happen. She noted that her blood sugar dropped to the 50's and she had symptoms of hypoglycemia. She noted ongoing suicidal ideation with plan to potentially overdose insulin, therefore was brought to the ED for further evaluation.     At present the patient denies any suicidal thoughts. She reports having an \"upset stomach,\" however denies any nausea, vomiting, dyspepsia or reflux. She notes ongoing abdominal aches and diarrhea which she says is chronic and related to her IBS. She denies any fevers or chills. She denies any other physical complaints.    Review of Systems   The 10 point Review of Systems is negative other than noted in the HPI or here.     PMH    Schizoaffective disorder    MDD    CINDY on CPAP    Uterine cancer s/p hysterectomy    Type II DM    Nonobstructive CAD    HTN    HLD    Hx Takotsubo cardiomyopathy    Osteopenia    Obesity    Migraine headaches    IBS    Hx polysubstance abuse (heroin, cocaine), in remission    PSH    Trigger finger release    Cataract surgery    Lap cholecystectomy    Hysterectomy     Coronary angiogram (5/2014)    Hysterectomy with oophorectomy    SH    Tobacco:  None    ETOH:  None    Drugs:  Remote    FH    Reviewed, non-contributory     Meds    Famotidine 40mg at bedtime    Lidoderm patch    Albuterol inhaler " prn    Voltaren 1% gel QID    Compazine 5mg q 6h prn    Senokot BID prn    Imitrex 25mg prn    ASA 81mg daily    Lipitor 80mg daily    Tessalon 100mg TID prn    Benztropine 1mg BID    Calcium supplement daily    Vitamin D3 50,000 units monthly    Klonopin 0.5mg at bedtime     Mucinex DM BID prn    Gabapentin 600mg TID    Ibuprofen 200mg q 8h prn    Novolog 20 units TID, additional 5 units for glc >500    Glargine 60 units at bedtime     Duoneb q 6h prn    Losartan 50mg daily    Melatonin 5mg at bedtime     Metoprolol XL 25mg daily    Naltrexone 50mg daily    Paliperidone 9mg daily    Miralax prn    Semaglutide 1mg q 7 days    Sertraline 200mg daily    Topamax 50mg BID    Allergies    Imidazole antifungals - hives    Ketoprofen - itching    Lisinopril - hives    Metformin - lactic acidosis    Metronidazole - hives        Physical Exam   BP (!) 157/83   Pulse 83   Temp 97.2  F (36.2  C) (Tympanic)   Resp 16   Ht 1.524 m (5')   Wt 108.5 kg (239 lb 4.8 oz)   LMP 01/06/2015   SpO2 95%   BMI 46.74 kg/m     General:  Awake. Alert. NAD. Appears comfortable.    HEENT:  No scleral icterus. Mucous membranes moist.   Cardiovascular:  RRR. S1, S2. No murmur.   Respiratory:  Normal effort. Lungs CTAB. No wheezing, rhonchi or rales.  Gastrointestinal:  Abdomen soft, non-distended. Active bowel sounds in all quadrants. No tenderness, guarding, or rebound.    Neurological:  Grossly non-focal. Moves all extremities.    Extremities:  No peripheral edema. No calf tenderness.   Skin:  Dry. No visible rash.    Data   Glucose 110-219 in past 24h  Drug screen negative

## 2019-12-21 NOTE — ED NOTES
"ED to Behavioral Floor Handoff    SITUATION  Nena Tang is a 59 year old female who speaks English and lives in a group home with others The patient arrived in the ED by ambulance from group home with a complaint of Suicidal (Pt reports SI in the past few days and reports, \"I was taking too much insulin a couple days ago.\" Pt denies taking extra insulin today and denies SI at this time. )  .The patient's current symptoms started/worsened 1 week(s) ago and during this time the symptoms have increased.   In the ED, pt was diagnosed with   Final diagnoses:   Schizoaffective disorder, bipolar type (H)        Initial vitals were: BP: 139/75  Pulse: 79  Temp: 98.8  F (37.1  C)  Resp: 16  SpO2: 97 %   --------  Is the patient diabetic? Yes   If yes, last blood glucose? --     If yes, was this treated in the ED? --  --------  Is the patient inebriated (ETOH) No or Impaired on other substances? No  MSSA done? N/A  Last MSSA score: --    Were withdrawal symptoms treated? N/A  Does the patient have a seizure history? No. If yes, date of most recent seizure--  --------  Is the patient patient experiencing suicidal ideation? reports the following suicide factors: misses family especially during Christmas season    Homicidal ideation? denies current or recent homicidal ideation or behaviors.    Self-injurious behavior/urges? reports current or recent self injurious behavior or ideation including taking extra insulin.  ------  Was pt aggressive in the ED No  Was a code called No  Is the pt now cooperative? Yes  -------  Meds given in ED: Medications - No data to display   Family present during ED course? No  Family currently present? No    BACKGROUND  Does the patient have a cognitive impairment or developmental disability? No  Allergies:   Allergies   Allergen Reactions     Imidazole Antifungals Hives     Tolerates diflucan     Ketoprofen Itching     Pruritis to topical     Lisinopril Hives     Metformin Other (See " Comments)     Patient hospitalized for lactic acidosis - admitting provider suspectd caused by metformin     Metronidazole Hives     Posaconazole Hives     Tolerates diflucan   .   Social demographics are   Social History     Socioeconomic History     Marital status:      Spouse name: None     Number of children: None     Years of education: None     Highest education level: None   Occupational History     None   Social Needs     Financial resource strain: None     Food insecurity:     Worry: None     Inability: None     Transportation needs:     Medical: None     Non-medical: None   Tobacco Use     Smoking status: Never Smoker     Smokeless tobacco: Never Used   Substance and Sexual Activity     Alcohol use: No     Frequency: Never     Comment: last month     Drug use: No     Comment: history of     Sexual activity: Never     Partners: Male     Birth control/protection: None, Condom   Lifestyle     Physical activity:     Days per week: None     Minutes per session: None     Stress: None   Relationships     Social connections:     Talks on phone: None     Gets together: None     Attends Spiritism service: None     Active member of club or organization: None     Attends meetings of clubs or organizations: None     Relationship status: None     Intimate partner violence:     Fear of current or ex partner: None     Emotionally abused: None     Physically abused: None     Forced sexual activity: None   Other Topics Concern     Parent/sibling w/ CABG, MI or angioplasty before 65F 55M? Not Asked      Service No     Blood Transfusions No     Caffeine Concern No     Occupational Exposure No     Hobby Hazards No     Sleep Concern Yes     Stress Concern Yes     Weight Concern Yes     Special Diet Yes     Comment: DM     Back Care Yes     Exercise Yes     Comment: WALKS DAILY     Bike Helmet Not Asked     Seat Belt Yes     Self-Exams No     Comment: ENCOURAGED   Social History Narrative     10/2014. Has 2  sons. 7 grandchildren.         Unemployed. Graduated HS.         Tobacco use: Denies    Alcohol use: Escalated use since   10/2014    Drug: Denies        ASSESSMENT  Labs results   Labs Ordered and Resulted from Time of ED Arrival Up to the Time of Departure from the ED   GLUCOSE BY METER - Abnormal; Notable for the following components:       Result Value    Glucose 219 (*)     All other components within normal limits   GLUCOSE MONITOR NURSING POCT   DRUG ABUSE SCREEN 6 CHEM DEP URINE ([facility])      Imaging Studies: No results found for this or any previous visit (from the past 24 hour(s)).   Most recent vital signs BP (!) 162/71   Pulse 71   Temp 97.3  F (36.3  C) (Oral)   Resp 16   LMP 2015   SpO2 96%    Abnormal labs/tests/findings requiring intervention:---   Pain control: pt had none  Nausea control: pt had none    RECOMMENDATION  Are any infection precautions needed (MRSA, VRE, etc.)? No If yes, what infection? --  ---  Does the patient have mobility issues? Stable and steady with walker. If yes, what device does the pt use? ---  ---  Is patient on 72 hour hold or commitment? No If on 72 hour hold, have hold and rights been given to patient? N/A  Are admitting orders written if after 10 p.m. ?N/A  Tasks needing to be completed:---     Magy Pleitez, RN    [phone] [facility]

## 2019-12-21 NOTE — PROGRESS NOTES
@ [id number]. Bedtime medications were not given as patient was sleeping already and some meds still need to be verified from the group home where the patient came from.  Patient slept the whole shift.  Slept a total of 6.25 hours.

## 2019-12-21 NOTE — ED NOTES
Patient told writer that she never had a fall incident and has a stable and steady gait using her walker. Patient also reported that she has not been using her CPAP for couple of months now, denies any problem in sleep. Patient reported that she can contract to safety and will not do any harm to self when in the unit.

## 2019-12-21 NOTE — PROGRESS NOTES
Initial Psychosocial Assessment    I have reviewed the chart, met with the patient, and developed Care Plan.  Information for assessment was obtained from:     Chart Review and Patient Interview    Presenting Problem:  Pt is admitted to [facility] under the care of Wade Siddiqi MD.  Pt presents tot the ED reporting suicidal ideations with a plan to overdose on insulin or cut.  She is also reporting auditory and visual hallucinations.    Per ED Note:  Nena Tang is a 59 year old female who comes in due to her worsening mood.  She lives in a group home due to her schizoaffective disorder.  She has been feeling more depressed and suicidal the last few days.  She has thoughts of either cutting or overdosing on insulin.  A few days ago she took some extra insulin as a practice attempt. She had no sequelae from that.  She also is seeing a man in a red cape with red eye telling her that he will kill her if she used her cpap.  She has stopped using her cpap.  She states the holidays are always hard for her as most of her family is dead so she might as well join them.     History of Mental Health and Chemical Dependency:  Pt has extensive psychiatric history with several previous admissions.  Pt's most recent  admission was at [facility] 2018.  Hx of schizoaffective disorder and PTSD.  No current substance use. Went to CD treatment 15 years ago at [facility]. Has 12+ years of sobriety.     Family Description (Constellation, Family Psychiatric History):  Pt grew up with both parents and 4 sisters. 1 sister  who also had schizoaffective D/O . Pt has 2 adult sons. There are two family members with CD issues.     Significant Life Events (Illness, Abuse, Trauma, Death):  Lost her mother and husbands within a few months of each other in . She also lost her sister  who  on pt s birthday last year.     Living Situation:  [facility], 3 years.  OK to return when she is stable and is willing to  "contract for safety.  (conflicting information need to confirm can she return to this Group Home.)    Educational Background:  High School Graduate with IEP    Occupational History:  Currently unemployed    Financial Status:  SSDI    Legal Issues:  None reportetd    Ethnic/Cultural Issues:      Spiritual Orientation:  Protestant     Service History:  No    Social Functioning (organization, interests):  None    Current Treatment Providers are:  PCP- Rowena Haas- [phone]- [facility]  [facility]- [phone]- [facility]  Therapy- \"Liliana\"  [facility]  Social Service Assessment/Plan:  Pt is in need of psychiatric evaluation and stabilization.  Her medicine will be reviewed and adjusted if indicated.  Pt will participate in therapeutic milieu, attend group therapy, and join in other unit activities.  CTC will coordinate care with community resources and confirm Pt may return to her Group Home.  "

## 2019-12-21 NOTE — PLAN OF CARE
"MARSHA FRANKS Kitty is a 59 year old year old female voluntarily admitted to station 3BW for suicidal ideation with an attempt to overdose on insulin.    S = Situation:     Patient reported attempting suicide a few days ago. She stated, \"I was taking too much insulin a couple days ago.\"     B  = Background:     Patient stays in a group home. Endorsed increased SI during the holidays, because she misses her family; most of who have .    Pt has a history of schizoaffective d/o and polysubstance abuse.     Pt endorsed command hallucinations telling her, \"you don't want to be here.\"    Pt reported 4 previous SI attempts. Attempts included: cutting, overdosing on alcohol and drugs. She denied that her recent attempt to overdose on insulin was an attempt to kill herself. She stated, \"I was just trying to see what was going to happen.\"    Medical concerns: Type II Diabetic; insulin dependent. Sleep apnea; advised to use a CPAP, but stopped using her CPAP 3 months ago because \"the voices told me to stop.\"    Pt uses a walker. Per pt, she needs assistance with showers.      A  =  Assessment:     Patient was calm and cooperative. AOx3. Body and belongings check were completed. Pt endorsed SI, but contracted for safety while on the unit. BG was 190. VS: T 97.0, R 18, P 77, BP (sitting) 129/75, SpO2 100%.    R =   Request or Recommendation:     15 minute checks for safety  Precautions: Suicide, SIB  Blood glucose checks QID + 2am, PRN  Carbohydrate counting  PTA medications ordered  Labs ordered: CBC, CMP, Hepatic panel, lipid panel, TSH, folate, hematocrit, Vitamin B12, UA, Vitamin D  "

## 2019-12-21 NOTE — PROGRESS NOTES
"CLINICAL NUTRITION SERVICES - ASSESSMENT NOTE     Nutrition Prescription    RECOMMENDATIONS FOR MDs/PROVIDERS TO ORDER:  None at this time     Malnutrition Status:    Patient does not meet two of the established criteria necessary for diagnosing malnutrition    Recommendations already ordered by Registered Dietitian (RD):  None at this time.     Future/Additional Recommendations:  Provide nutrition education for T2DM as appropriate.  Monitor PO intake and wt trends.      REASON FOR ASSESSMENT  Nena Tang is a/an 59 year old female assessed by the dietitian for Admission Nutrition Risk Screen for new/uncontrolled diabetes    NUTRITION HISTORY  Pt was very tired at time of RD visit and not able to provide a full nutrition history. Nena reports that she normally eats 3 meals per day but that she hasn't been feeling good since her admission.     CURRENT NUTRITION ORDERS  Diet: Moderate Consistent Carbohydrate  Intake/Tolerance: Pt reports that she hasn't had anything to eat today and has only had beverages (water, juice, gingerale).  Per RN note 12/21: \"Pt eating & drinking fluids.\" \"Pt out for lunch, ate 75%, still complains of stomach pain but refusing Maalox.\"     LABS  Labs reviewed  BG 12/20-12/21: 110-219  A1C 12/3: 6.2    MEDICATIONS  Medications reviewed  Novolog  Lantus pen    ANTHROPOMETRICS  Height: 152.4 cm (5' 0\")  Most Recent Weight: 108.5 kg (239 lb 4.8 oz)    IBW: 45.5 kg (238%)   BMI: Obesity Grade III BMI >40  Weight History: Pt's wt appears to be stable at 230-240 lbs.   Wt Readings from Last 20 Encounters:   12/20/19 108.5 kg (239 lb 4.8 oz)   12/20/19 110.5 kg (243 lb 8 oz)   12/03/19 111.4 kg (245 lb 8 oz)   11/21/19 111.1 kg (245 lb)   11/04/19 109 kg (240 lb 4.8 oz)   11/01/19 110.7 kg (244 lb)   10/25/19 110.2 kg (243 lb)   10/16/19 109.8 kg (242 lb)   10/11/19 109.8 kg (242 lb)   10/05/19 98.5 kg (217 lb 3.2 oz)   09/23/19 104.3 kg (230 lb)   09/10/19 109.8 kg (242 lb)   08/09/19 " 106.6 kg (235 lb)   08/06/19 106.1 kg (234 lb)   07/31/19 104.1 kg (229 lb 9.6 oz)   07/22/19 107.3 kg (236 lb 9.6 oz)   07/02/19 106.8 kg (235 lb 8 oz)   06/04/19 110.7 kg (244 lb)   05/24/19 109.3 kg (241 lb)   05/21/19 109.3 kg (241 lb)     Dosing Weight: 61 kg (adjusted based on most recent wt and IBW)     ASSESSED NUTRITION NEEDS  Estimated Energy Needs: 9368-5012 kcals/day (20 - 25 kcals/kg)  Justification: Obese  Estimated Protein Needs: 61-73 grams protein/day (1 - 1.2 grams of pro/kg)  Justification: Preservation of LBM  Estimated Fluid Needs: 5300-5313 mL/day (25 - 30 mL/kg)   Justification: Maintenance    PHYSICAL FINDINGS  See malnutrition section below.    MALNUTRITION  % Intake: Decreased intake does not meet criteria  % Weight Loss: None noted  Subcutaneous Fat Loss: None observed  Muscle Loss: None observed  Fluid Accumulation/Edema: None noted  Malnutrition Diagnosis: Patient does not meet two of the established criteria necessary for diagnosing malnutrition    NUTRITION DIAGNOSIS  Predicted inadequate nutrient intake (calories/protein) related to current appetite okay (ate 75% of lunch per RN and pt reports eating well PTA) as evidenced by potential for decline w/ recent nausea.      INTERVENTIONS  Implementation  Nutrition Education: Discussed current appetite/PO and role of RD. Pt falling asleep and not appropriate for nutrition education regarding T2DM at this time.     Goals  Patient to consume % of nutritionally adequate meal trays TID, or the equivalent with supplements/snacks.     Monitoring/Evaluation  Progress toward goals will be monitored and evaluated per protocol.    Ora Mosher RD, LD  Unit pager: [phone]  Weekend pager: [phone]

## 2019-12-22 LAB
ALBUMIN SERPL-MCNC: 3.2 G/DL (ref 3.4–5)
ALBUMIN UR-MCNC: NEGATIVE MG/DL
ALP SERPL-CCNC: 88 U/L (ref 40–150)
ALT SERPL W P-5'-P-CCNC: 22 U/L (ref 0–50)
ANION GAP SERPL CALCULATED.3IONS-SCNC: 8 MMOL/L (ref 3–14)
APPEARANCE UR: CLEAR
AST SERPL W P-5'-P-CCNC: 8 U/L (ref 0–45)
BACTERIA #/AREA URNS HPF: ABNORMAL /HPF
BASOPHILS # BLD AUTO: 0 10E9/L (ref 0–0.2)
BASOPHILS NFR BLD AUTO: 0.3 %
BILIRUB DIRECT SERPL-MCNC: <0.1 MG/DL (ref 0–0.2)
BILIRUB SERPL-MCNC: 0.2 MG/DL (ref 0.2–1.3)
BILIRUB UR QL STRIP: NEGATIVE
BUN SERPL-MCNC: 16 MG/DL (ref 7–30)
CALCIUM SERPL-MCNC: 8.7 MG/DL (ref 8.5–10.1)
CHLORIDE SERPL-SCNC: 111 MMOL/L (ref 94–109)
CHOLEST SERPL-MCNC: 154 MG/DL
CO2 SERPL-SCNC: 22 MMOL/L (ref 20–32)
COLOR UR AUTO: YELLOW
CREAT SERPL-MCNC: 0.86 MG/DL (ref 0.52–1.04)
DIFFERENTIAL METHOD BLD: ABNORMAL
EOSINOPHIL # BLD AUTO: 0.1 10E9/L (ref 0–0.7)
EOSINOPHIL NFR BLD AUTO: 1.2 %
ERYTHROCYTE [DISTWIDTH] IN BLOOD BY AUTOMATED COUNT: 13.6 % (ref 10–15)
FOLATE SERPL-MCNC: 8.5 NG/ML
GFR SERPL CREATININE-BSD FRML MDRD: 74 ML/MIN/{1.73_M2}
GLUCOSE BLDC GLUCOMTR-MCNC: 110 MG/DL (ref 70–99)
GLUCOSE BLDC GLUCOMTR-MCNC: 204 MG/DL (ref 70–99)
GLUCOSE BLDC GLUCOMTR-MCNC: 224 MG/DL (ref 70–99)
GLUCOSE BLDC GLUCOMTR-MCNC: 238 MG/DL (ref 70–99)
GLUCOSE SERPL-MCNC: 111 MG/DL (ref 70–99)
GLUCOSE UR STRIP-MCNC: 300 MG/DL
HCT VFR BLD AUTO: 34.9 % (ref 35–47)
HDLC SERPL-MCNC: 42 MG/DL
HGB BLD-MCNC: 10.8 G/DL (ref 11.7–15.7)
HGB UR QL STRIP: NEGATIVE
IMM GRANULOCYTES # BLD: 0 10E9/L (ref 0–0.4)
IMM GRANULOCYTES NFR BLD: 0.4 %
KETONES UR STRIP-MCNC: NEGATIVE MG/DL
LDLC SERPL CALC-MCNC: 86 MG/DL
LEUKOCYTE ESTERASE UR QL STRIP: NEGATIVE
LYMPHOCYTES # BLD AUTO: 3 10E9/L (ref 0.8–5.3)
LYMPHOCYTES NFR BLD AUTO: 40.2 %
MCH RBC QN AUTO: 30.3 PG (ref 26.5–33)
MCHC RBC AUTO-ENTMCNC: 30.9 G/DL (ref 31.5–36.5)
MCV RBC AUTO: 98 FL (ref 78–100)
MONOCYTES # BLD AUTO: 0.6 10E9/L (ref 0–1.3)
MONOCYTES NFR BLD AUTO: 8 %
NEUTROPHILS # BLD AUTO: 3.8 10E9/L (ref 1.6–8.3)
NEUTROPHILS NFR BLD AUTO: 49.9 %
NITRATE UR QL: NEGATIVE
NONHDLC SERPL-MCNC: 112 MG/DL
NRBC # BLD AUTO: 0 10*3/UL
NRBC BLD AUTO-RTO: 0 /100
PH UR STRIP: 6 PH (ref 5–7)
PLATELET # BLD AUTO: 221 10E9/L (ref 150–450)
POTASSIUM SERPL-SCNC: 4.1 MMOL/L (ref 3.4–5.3)
PROT SERPL-MCNC: 7.5 G/DL (ref 6.8–8.8)
RBC # BLD AUTO: 3.57 10E12/L (ref 3.8–5.2)
RBC #/AREA URNS AUTO: 0 /HPF (ref 0–2)
SODIUM SERPL-SCNC: 141 MMOL/L (ref 133–144)
SOURCE: ABNORMAL
SP GR UR STRIP: 1.02 (ref 1–1.03)
SQUAMOUS #/AREA URNS AUTO: 1 /HPF (ref 0–1)
TRIGL SERPL-MCNC: 132 MG/DL
TSH SERPL DL<=0.005 MIU/L-ACNC: 1.23 MU/L (ref 0.4–4)
UROBILINOGEN UR STRIP-MCNC: NORMAL MG/DL (ref 0–2)
VIT B12 SERPL-MCNC: 358 PG/ML (ref 193–986)
WBC # BLD AUTO: 7.5 10E9/L (ref 4–11)
WBC #/AREA URNS AUTO: <1 /HPF (ref 0–5)

## 2019-12-22 PROCEDURE — 84443 ASSAY THYROID STIM HORMONE: CPT | Performed by: PSYCHIATRY & NEUROLOGY

## 2019-12-22 PROCEDURE — 25000132 ZZH RX MED GY IP 250 OP 250 PS 637: Mod: GY | Performed by: PSYCHIATRY & NEUROLOGY

## 2019-12-22 PROCEDURE — 85025 COMPLETE CBC W/AUTO DIFF WBC: CPT | Performed by: PSYCHIATRY & NEUROLOGY

## 2019-12-22 PROCEDURE — 80076 HEPATIC FUNCTION PANEL: CPT | Performed by: PSYCHIATRY & NEUROLOGY

## 2019-12-22 PROCEDURE — 12400002 ZZH R&B MH SENIOR/ADOLESCENT

## 2019-12-22 PROCEDURE — 00000146 ZZHCL STATISTIC GLUCOSE BY METER IP

## 2019-12-22 PROCEDURE — 82746 ASSAY OF FOLIC ACID SERUM: CPT | Performed by: PSYCHIATRY & NEUROLOGY

## 2019-12-22 PROCEDURE — 82248 BILIRUBIN DIRECT: CPT | Performed by: PSYCHIATRY & NEUROLOGY

## 2019-12-22 PROCEDURE — 80053 COMPREHEN METABOLIC PANEL: CPT | Performed by: PSYCHIATRY & NEUROLOGY

## 2019-12-22 PROCEDURE — 25000132 ZZH RX MED GY IP 250 OP 250 PS 637: Mod: GY | Performed by: NURSE PRACTITIONER

## 2019-12-22 PROCEDURE — 82607 VITAMIN B-12: CPT | Performed by: PSYCHIATRY & NEUROLOGY

## 2019-12-22 PROCEDURE — 25000131 ZZH RX MED GY IP 250 OP 636 PS 637: Mod: GY | Performed by: PHYSICIAN ASSISTANT

## 2019-12-22 PROCEDURE — 36415 COLL VENOUS BLD VENIPUNCTURE: CPT | Performed by: PSYCHIATRY & NEUROLOGY

## 2019-12-22 PROCEDURE — 82306 VITAMIN D 25 HYDROXY: CPT | Performed by: PSYCHIATRY & NEUROLOGY

## 2019-12-22 PROCEDURE — 80061 LIPID PANEL: CPT | Performed by: PSYCHIATRY & NEUROLOGY

## 2019-12-22 PROCEDURE — 81001 URINALYSIS AUTO W/SCOPE: CPT | Performed by: PSYCHIATRY & NEUROLOGY

## 2019-12-22 RX ADMIN — NYSTATIN: 100000 CREAM TOPICAL at 09:30

## 2019-12-22 RX ADMIN — CLONAZEPAM 0.5 MG: 0.5 TABLET ORAL at 21:16

## 2019-12-22 RX ADMIN — INSULIN ASPART 2 UNITS: 100 INJECTION, SOLUTION INTRAVENOUS; SUBCUTANEOUS at 12:39

## 2019-12-22 RX ADMIN — GABAPENTIN 300 MG: 300 CAPSULE ORAL at 14:27

## 2019-12-22 RX ADMIN — FAMOTIDINE 40 MG: 20 TABLET ORAL at 21:15

## 2019-12-22 RX ADMIN — Medication 10 MG: at 21:15

## 2019-12-22 RX ADMIN — AMANTADINE HYDROCHLORIDE 100 MG: 100 CAPSULE ORAL at 09:30

## 2019-12-22 RX ADMIN — NYSTATIN: 100000 CREAM TOPICAL at 21:28

## 2019-12-22 RX ADMIN — ATORVASTATIN CALCIUM 80 MG: 80 TABLET, FILM COATED ORAL at 09:30

## 2019-12-22 RX ADMIN — SERTRALINE HYDROCHLORIDE 200 MG: 100 TABLET ORAL at 09:29

## 2019-12-22 RX ADMIN — INSULIN ASPART 2 UNITS: 100 INJECTION, SOLUTION INTRAVENOUS; SUBCUTANEOUS at 17:10

## 2019-12-22 RX ADMIN — AMANTADINE HYDROCHLORIDE 100 MG: 100 CAPSULE ORAL at 19:12

## 2019-12-22 RX ADMIN — TOPIRAMATE 50 MG: 50 TABLET ORAL at 21:15

## 2019-12-22 RX ADMIN — ASPIRIN 81 MG: 81 TABLET ORAL at 09:30

## 2019-12-22 RX ADMIN — GABAPENTIN 300 MG: 300 CAPSULE ORAL at 19:12

## 2019-12-22 RX ADMIN — INSULIN GLARGINE 30 UNITS: 100 INJECTION, SOLUTION SUBCUTANEOUS at 21:18

## 2019-12-22 RX ADMIN — PALIPERIDONE 9 MG: 9 TABLET, EXTENDED RELEASE ORAL at 21:15

## 2019-12-22 ASSESSMENT — ACTIVITIES OF DAILY LIVING (ADL)
HYGIENE/GROOMING: INDEPENDENT
LAUNDRY: UNABLE TO COMPLETE
DRESS: SCRUBS (BEHAVIORAL HEALTH)
ORAL_HYGIENE: INDEPENDENT
ORAL_HYGIENE: INDEPENDENT
DRESS: SCRUBS (BEHAVIORAL HEALTH)
HYGIENE/GROOMING: INDEPENDENT
LAUNDRY: UNABLE TO COMPLETE

## 2019-12-22 NOTE — PROVIDER NOTIFICATION
"Pt orthostatic and feeling dizzy with standing & sitting. HELD blood pressure medication and gabapentin until pt stabilizes from dizziness and BP improves. Educated pt that symptoms could be from dehydration, as she had emesis and diarrhea yesterday; None today. Pushed fluids 650 mL and vitals improved a bit. Gave water pitcher & RN should remind pt to drink. Used temporary standby assist for walking.    Pt continued to states she felt \"dizzy\" while sitting & standing, but was stable walking with walker. She also complained of \"blurry vision\" and headache. Discussed with provider, who feels it is dehydration. Ordered intake/output charting; push fluids.     12/22/19 0900 12/22/19 1010   Vital Signs   Temp 96.9  F (36.1  C)  --    Temp src Tympanic  --    Resp 16 16   Pulse  --  76   Pulse/Heart Rate Source Monitor Monitor   Sitting Orthostatic BP   Sitting Orthostatic /78 111/71   Sitting Orthostatic Pulse 83 bpm 76 bpm   Standing Orthostatic BP   Standing Orthostatic BP 78/54 95/65   Standing Orthostatic Pulse 86 bpm 83 bpm   Oxygen Therapy   SpO2 96 % 97 %   O2 Device None (Room air) None (Room air)     "

## 2019-12-22 NOTE — PLAN OF CARE
Patient calm and cooperative, visible in milieu.  Flat affect, denies SI/SIB/HI, denies depression/anxiety.  Good appetite at dinner and snack time.  Took scheduled medications with no problem.   and 158 this shift.  Received scheduled insulin; resting comfortably in room.  Will continue to monitor closely.

## 2019-12-22 NOTE — PROGRESS NOTES
"   12/22/19 3133   Behavioral Health   Hallucinations denies / not responding to hallucinations   Thinking intact;poor concentration   Orientation person: oriented;place: oriented;date: oriented;time: oriented   Memory baseline memory   Insight poor   Judgement impaired   Eye Contact at examiner   Affect sad;blunted, flat   Mood mood is calm;depressed   Physical Appearance/Attire attire appropriate to age and situation   Hygiene other (see comment)  (adequate)   Suicidality thoughts only;other (see comments)  (\"fear of thoughts of hurting myself or dying\")   1. Wish to be Dead (Recent) No   2. Non-Specific Active Suicidal Thoughts (Recent) No   Self Injury other (see comment)  (pt denies)   Elopement   (none noted)   Activity other (see comment)  (present in groups and milieu)   Speech clear;coherent;other (see comments)  (soft spoken, but conversational upon appraoch)   Medication Sensitivity no stated side effects;no observed side effects   Psychomotor / Gait balanced;steady;slow  (uses 4x4 walker)   Coping/Psychosocial   Verbalized Emotional State acceptance;depression;sadness   Plan of Care Reviewed With patient   Patient Agreement with Plan of Care agrees   Psycho Education   Type of Intervention 1:1 intervention   Response participates, initiates socially appropriate   Hours 0.5   Treatment Detail check in   Group Therapy Session   Group Attendance attended group session   Safety   Suicidality Status 15;Optimize communication / relationship to minimize opportunity for self-harm;Promote patient engagement with treatment process;Identify and strengthen protective factors   Activities of Daily Living   Hygiene/Grooming independent   Oral Hygiene independent   Dress scrubs (behavioral health)   Laundry unable to complete   Room Organization independent   Activity   Activity Assistance Provided independent     Pt was out in milieu most of shift. Pt ate well at meals and attended groups. Pt took many phone calls " "today which she stated were \"my son and daughter in law.\" Pt noted to be tearful after conversing on the phone and she stated \"sometimes my son makes me feel that way.\" Pt denies SI, but stated she still has the \"fear of thoughts of hurting myself or dying\". Pt stated feeling safe here and would approach staff if thoughts change. Pt affect flat and sad with calm depressed mood. Pt asked what her goal for this stay is and she stated \"I want those fears to go away\" referring to the fears of SI/SIB. Pt stated that her son was coming to visit this afternoon and feels good about him coming. Pt ambulates with 4x4 walker and is independent with ADLs. Pt calm, cooperative, and pleasant with check in.  "

## 2019-12-23 LAB
DEPRECATED CALCIDIOL+CALCIFEROL SERPL-MC: 39 UG/L (ref 20–75)
GLUCOSE BLDC GLUCOMTR-MCNC: 132 MG/DL (ref 70–99)
GLUCOSE BLDC GLUCOMTR-MCNC: 151 MG/DL (ref 70–99)
GLUCOSE BLDC GLUCOMTR-MCNC: 194 MG/DL (ref 70–99)
GLUCOSE BLDC GLUCOMTR-MCNC: 197 MG/DL (ref 70–99)
GLUCOSE BLDC GLUCOMTR-MCNC: 286 MG/DL (ref 70–99)
GLUCOSE BLDC GLUCOMTR-MCNC: 301 MG/DL (ref 70–99)

## 2019-12-23 PROCEDURE — 25000132 ZZH RX MED GY IP 250 OP 250 PS 637: Mod: GY | Performed by: PSYCHIATRY & NEUROLOGY

## 2019-12-23 PROCEDURE — 00000146 ZZHCL STATISTIC GLUCOSE BY METER IP

## 2019-12-23 PROCEDURE — 99232 SBSQ HOSP IP/OBS MODERATE 35: CPT | Performed by: NURSE PRACTITIONER

## 2019-12-23 PROCEDURE — 25000132 ZZH RX MED GY IP 250 OP 250 PS 637: Mod: GY | Performed by: NURSE PRACTITIONER

## 2019-12-23 PROCEDURE — 12400002 ZZH R&B MH SENIOR/ADOLESCENT

## 2019-12-23 RX ADMIN — FAMOTIDINE 40 MG: 20 TABLET ORAL at 21:17

## 2019-12-23 RX ADMIN — SENNOSIDES AND DOCUSATE SODIUM 1 TABLET: 8.6; 5 TABLET ORAL at 14:30

## 2019-12-23 RX ADMIN — GABAPENTIN 300 MG: 300 CAPSULE ORAL at 21:16

## 2019-12-23 RX ADMIN — TOPIRAMATE 50 MG: 50 TABLET ORAL at 21:16

## 2019-12-23 RX ADMIN — INSULIN ASPART 2 UNITS: 100 INJECTION, SOLUTION INTRAVENOUS; SUBCUTANEOUS at 11:59

## 2019-12-23 RX ADMIN — ASPIRIN 81 MG: 81 TABLET ORAL at 09:01

## 2019-12-23 RX ADMIN — HYDROXYZINE HYDROCHLORIDE 25 MG: 25 TABLET, FILM COATED ORAL at 14:30

## 2019-12-23 RX ADMIN — INSULIN ASPART 1 UNITS: 100 INJECTION, SOLUTION INTRAVENOUS; SUBCUTANEOUS at 18:02

## 2019-12-23 RX ADMIN — ACETAMINOPHEN 650 MG: 325 TABLET, FILM COATED ORAL at 14:30

## 2019-12-23 RX ADMIN — PALIPERIDONE 9 MG: 9 TABLET, EXTENDED RELEASE ORAL at 21:17

## 2019-12-23 RX ADMIN — CLONAZEPAM 0.5 MG: 0.5 TABLET ORAL at 21:17

## 2019-12-23 RX ADMIN — ATORVASTATIN CALCIUM 80 MG: 80 TABLET, FILM COATED ORAL at 09:01

## 2019-12-23 RX ADMIN — AMANTADINE HYDROCHLORIDE 100 MG: 100 CAPSULE ORAL at 21:17

## 2019-12-23 RX ADMIN — GABAPENTIN 300 MG: 300 CAPSULE ORAL at 14:30

## 2019-12-23 RX ADMIN — GABAPENTIN 300 MG: 300 CAPSULE ORAL at 08:58

## 2019-12-23 RX ADMIN — Medication 10 MG: at 21:16

## 2019-12-23 RX ADMIN — AMANTADINE HYDROCHLORIDE 100 MG: 100 CAPSULE ORAL at 09:01

## 2019-12-23 RX ADMIN — SERTRALINE HYDROCHLORIDE 200 MG: 100 TABLET ORAL at 09:01

## 2019-12-23 ASSESSMENT — ACTIVITIES OF DAILY LIVING (ADL)
ORAL_HYGIENE: INDEPENDENT
LAUNDRY: UNABLE TO COMPLETE
HYGIENE/GROOMING: INDEPENDENT
DRESS: SCRUBS (BEHAVIORAL HEALTH);INDEPENDENT
HYGIENE/GROOMING: INDEPENDENT
ORAL_HYGIENE: INDEPENDENT
DRESS: INDEPENDENT
LAUNDRY: WITH SUPERVISION

## 2019-12-23 NOTE — PROGRESS NOTES
Brief Medicine Note    Hospitalist service following glucose trend as regiment was changed d/t lack of po.  Pt has taken 2L po yesterday with 29g carbohydrate.     PTA meds on hold: semaglutide, novolog, 1/2 insulin glargine dose      Today's vital signs, medications, and nursing notes were reviewed. Labs reviewed.     /83   Pulse 83   Temp 97.7  F (36.5  C) (Oral)   Resp 16   Ht 1.524 m (5')   Wt 108.5 kg (239 lb 4.8 oz)   LMP 01/06/2015   SpO2 98%   BMI 46.74 kg/m        Recent Labs   Lab 12/23/19  0201 12/22/19  2107 12/22/19  1632 12/22/19  1217 12/22/19  0651 12/22/19  0147 12/21/19  2157   GLC  --   --   --   --  111*  --   --    * 204* 224* 238*  --  110* 158*       A/P:  Type II DM.  controlled at baseline, last Hgb A1C 6.2% on 12/3/19. PTA regimen includes Glargine, novolg w meals, and semaglutide weekly. BG remain in the ~200+ range   - increase Glargine to 40 units and monitor  -was started on Glargine 30 units, PTA Glargine is 60 units at bedtime until taking adequate PO  - Hold scheduled novolog as well  - Hold Semaglutide while inpatient  - Start medium intensity sliding scale AC/HS  - Monitor sugars AC/HS and 0200  - Hypoglycemia protocol ordered    Laura Young PA-C  Hospitalist Service  Pager: [phone]

## 2019-12-23 NOTE — PROGRESS NOTES
Patient was napping in her room at the beginning of the shift but spent time before and after dinner out in the milieu.  She had a flat, sad affect and depressed mood.  She was cooperative and pleasant when engaged and took initiative to inform staff of her needs.  She requested a clean scrub top and changed before going to sleep.  Patient agrees to inform staff if she has any concerns.

## 2019-12-23 NOTE — PLAN OF CARE
"Problem: Suicidal Behavior  Goal: Suicidal Behavior is Absent or Managed  Outcome: Improving     RN held blood pressure medication due to continued orthostatic drop to 98/65.  Nursing to measure fluid intake and calorie counts, but not output per provider.  Drank 950 mL this shift.    Pt states depression today is \"mild\" and denies anxiety, stating she feels better since admission. She denies all other MH symptoms. She continues to be very somnolent, napping most of day, drifting off during conversation. She states this is new since being admitted here. Provider considering medication adjustments.     Pt manages her own insulin in group home, using glucometer and insulin pen. Staff does check that her dose is correct and observes administration. She was asked today how she checks blood sugar when out in community, as she does take insulin pen when leaving group home, and that's when she has been purposely overdosing on insulin. She states she brings her glucometer.  Blood sugars today = 132, 194 [2 units Novolog given].    *Son Scot visited & spoke w/ Agatha re: discharge back to group home. Provider ready to discharge pt and pt feels ready to go.  cannot accept her back due to insulin safety issues [See Agatha's notes.]  Decision was made that pt will be discharged to Assisted Living facility, so will be here pending placement. Pt is not happy about it, but understands. She is tearful and may need extra emotional support next couple of days.    Nena complains of stomach pain and mild headache, but refuses Maalox or Tylenol. Group Home staff stated she has \"chronic gastritis & daily stomach pain in the mornings that goes away by afternoon.\"     Recommendations: Try compazine for nausea; requested from pharmacy. Push fluid intake. Monitor calorie count. Very picky about foods she will eat, even though had help completing menu. Won't always eat what she ordered.    "

## 2019-12-23 NOTE — PROGRESS NOTES
"[facility], [place]   Psychiatric Progress Note        Interim History:   From H&P: Nena Tang is a 59 year old female with history of schizoaffective disorder, polysubstance abuse, type 2 diabetes, sleep apnea, and borderline intellectual functioning.  The patient went to the emergency room for evaluation of increased depression, and suicidal ideation.  The patient lives in a group home.  Reported having increasing thoughts of cutting or overdosing on insulin as a suicide attempt.  The patient reported that for several days, she has been taking extra insulin \"as a practice attempt\".  She reported having auditory hallucinations of seeing a man in in [place] and red eyes telling her to kill herself if she uses her CPAP machines.  She stopped using the CPAP.  She reported that the holidays are difficult for her as she lost multiple family members around this time of the year.    The patient's care was discussed with the treatment team during the daily team meeting and/or staff's chart notes were reviewed.  Staff report patient has been calm, pleasant, cooperative. Has been sleeping on and off during the day and evening. Feels dizzy, some meds were held. Passive SI continues.  Patient is not attending groups. Patient is eating well and taking medications as prescribed. Slept 8 hours.      Met with patient. She is in bed. Feels good. Denies AH/VH. Depression and anxiety are \"OK\". Denies SI. Wants to go home.   The group home is not willing to take her back.          Medications:       amantadine  100 mg Oral BID     aspirin  81 mg Oral Daily     atorvastatin  80 mg Oral Daily     clonazePAM  0.5 mg Oral At Bedtime     famotidine  40 mg Oral At Bedtime     gabapentin  300 mg Oral TID     insulin aspart  1-7 Units Subcutaneous TID AC     insulin aspart  1-5 Units Subcutaneous At Bedtime     insulin glargine  30 Units Subcutaneous At Bedtime     losartan  50 mg Oral Daily     melatonin  10 " mg Oral At Bedtime     metoprolol succinate ER  25 mg Oral Daily     nystatin   Topical BID     paliperidone ER  9 mg Oral At Bedtime     sertraline  200 mg Oral Daily     topiramate  50 mg Oral At Bedtime          Allergies:     Allergies   Allergen Reactions     Imidazole Antifungals Hives     Tolerates diflucan     Ketoprofen Itching     Pruritis to topical     Lisinopril Hives     Metformin Other (See Comments)     Patient hospitalized for lactic acidosis - admitting provider suspectd caused by metformin     Metronidazole Hives     Posaconazole Hives     Tolerates diflucan          Labs:     Recent Results (from the past 672 hour(s))   Hemoglobin A1c    Collection Time: 12/03/19 11:43 AM   Result Value Ref Range    Hemoglobin A1C 6.2 (H) 0 - 5.6 %   Comprehensive metabolic panel (BMP + Alb, Alk Phos, ALT, AST, Total. Bili, TP)    Collection Time: 12/03/19 11:43 AM   Result Value Ref Range    Sodium 139 133 - 144 mmol/L    Potassium 4.1 3.4 - 5.3 mmol/L    Chloride 110 (H) 94 - 109 mmol/L    Carbon Dioxide 21 20 - 32 mmol/L    Anion Gap 8 3 - 14 mmol/L    Glucose 146 (H) 70 - 99 mg/dL    Urea Nitrogen 16 7 - 30 mg/dL    Creatinine 0.91 0.52 - 1.04 mg/dL    GFR Estimate 69 >60 mL/min/[1.73_m2]    GFR Estimate If Black 80 >60 mL/min/[1.73_m2]    Calcium 8.9 8.5 - 10.1 mg/dL    Bilirubin Total 0.2 0.2 - 1.3 mg/dL    Albumin 3.4 3.4 - 5.0 g/dL    Protein Total 7.7 6.8 - 8.8 g/dL    Alkaline Phosphatase 90 40 - 150 U/L    ALT 26 0 - 50 U/L    AST 15 0 - 45 U/L   Drug Abuse Screen Panel 13, Urine (Pain Care Package)    Collection Time: 12/03/19 12:11 PM   Result Value Ref Range    Cannabinoids (25-rhi-5-carboxy-9-THC) Not Detected NDET^Not Detected ng/mL    Phencyclidine (Phencyclidine) Not Detected NDET^Not Detected ng/mL    Cocaine (Benzoylecgonine) Not Detected NDET^Not Detected ng/mL    Methamphetamine (d-Methamphetamine) Not Detected NDET^Not Detected ng/mL    Opiates (Morphine) Not Detected NDET^Not Detected  ng/mL    Amphetamine (d-Amphetamine) Not Detected NDET^Not Detected ng/mL    Benzodiazepines (Nordiazepam) Not Detected NDET^Not Detected ng/mL    Tricyclic Antidepressants (Desipramine) Not Detected NDET^Not Detected ng/mL    Methadone (Methadone) Not Detected NDET^Not Detected ng/mL    Barbiturates (Butalbital) Not Detected NDET^Not Detected ng/mL    Oxycodone (Oxycodone) Not Detected NDET^Not Detected ng/mL    Propoxyphene (Norpropoxyphene) Not Detected NDET^Not Detected ng/mL    Buprenorphine (Buprenorphine) Not Detected NDET^Not Detected ng/mL   Glucose by meter    Collection Time: 12/20/19  4:04 PM   Result Value Ref Range    Glucose 219 (H) 70 - 99 mg/dL   Drug abuse screen 6 urine (chem dep)    Collection Time: 12/20/19  4:19 PM   Result Value Ref Range    Amphetamine Qual Urine Negative NEG^Negative    Barbiturates Qual Urine Negative NEG^Negative    Benzodiazepine Qual Urine Negative NEG^Negative    Cannabinoids Qual Urine Negative NEG^Negative    Cocaine Qual Urine Negative NEG^Negative    Ethanol Qual Urine Negative NEG^Negative    Opiates Qualitative Urine Negative NEG^Negative   Glucose by meter    Collection Time: 12/20/19  8:56 PM   Result Value Ref Range    Glucose 110 (H) 70 - 99 mg/dL   Glucose by meter    Collection Time: 12/20/19 11:13 PM   Result Value Ref Range    Glucose 190 (H) 70 - 99 mg/dL   Glucose by meter    Collection Time: 12/21/19  2:05 AM   Result Value Ref Range    Glucose 177 (H) 70 - 99 mg/dL   Glucose by meter    Collection Time: 12/21/19  7:45 AM   Result Value Ref Range    Glucose 148 (H) 70 - 99 mg/dL   Glucose by meter    Collection Time: 12/21/19 11:09 AM   Result Value Ref Range    Glucose 134 (H) 70 - 99 mg/dL   Glucose by meter    Collection Time: 12/21/19 12:26 PM   Result Value Ref Range    Glucose 123 (H) 70 - 99 mg/dL   Glucose by meter    Collection Time: 12/21/19  5:13 PM   Result Value Ref Range    Glucose 130 (H) 70 - 99 mg/dL   Glucose by meter    Collection  Time: 12/21/19  9:57 PM   Result Value Ref Range    Glucose 158 (H) 70 - 99 mg/dL   Glucose by meter    Collection Time: 12/22/19  1:47 AM   Result Value Ref Range    Glucose 110 (H) 70 - 99 mg/dL   CBC with platelets differential    Collection Time: 12/22/19  6:51 AM   Result Value Ref Range    WBC 7.5 4.0 - 11.0 10e9/L    RBC Count 3.57 (L) 3.8 - 5.2 10e12/L    Hemoglobin 10.8 (L) 11.7 - 15.7 g/dL    Hematocrit 34.9 (L) 35.0 - 47.0 %    MCV 98 78 - 100 fl    MCH 30.3 26.5 - 33.0 pg    MCHC 30.9 (L) 31.5 - 36.5 g/dL    RDW 13.6 10.0 - 15.0 %    Platelet Count 221 150 - 450 10e9/L    Diff Method Automated Method     % Neutrophils 49.9 %    % Lymphocytes 40.2 %    % Monocytes 8.0 %    % Eosinophils 1.2 %    % Basophils 0.3 %    % Immature Granulocytes 0.4 %    Nucleated RBCs 0 0 /100    Absolute Neutrophil 3.8 1.6 - 8.3 10e9/L    Absolute Lymphocytes 3.0 0.8 - 5.3 10e9/L    Absolute Monocytes 0.6 0.0 - 1.3 10e9/L    Absolute Eosinophils 0.1 0.0 - 0.7 10e9/L    Absolute Basophils 0.0 0.0 - 0.2 10e9/L    Abs Immature Granulocytes 0.0 0 - 0.4 10e9/L    Absolute Nucleated RBC 0.0    Comprehensive metabolic panel    Collection Time: 12/22/19  6:51 AM   Result Value Ref Range    Sodium 141 133 - 144 mmol/L    Potassium 4.1 3.4 - 5.3 mmol/L    Chloride 111 (H) 94 - 109 mmol/L    Carbon Dioxide 22 20 - 32 mmol/L    Anion Gap 8 3 - 14 mmol/L    Glucose 111 (H) 70 - 99 mg/dL    Urea Nitrogen 16 7 - 30 mg/dL    Creatinine 0.86 0.52 - 1.04 mg/dL    GFR Estimate 74 >60 mL/min/[1.73_m2]    GFR Estimate If Black 85 >60 mL/min/[1.73_m2]    Calcium 8.7 8.5 - 10.1 mg/dL    Bilirubin Total 0.2 0.2 - 1.3 mg/dL    Albumin 3.2 (L) 3.4 - 5.0 g/dL    Protein Total 7.5 6.8 - 8.8 g/dL    Alkaline Phosphatase 88 40 - 150 U/L    ALT 22 0 - 50 U/L    AST 8 0 - 45 U/L   Lipid panel    Collection Time: 12/22/19  6:51 AM   Result Value Ref Range    Cholesterol 154 <200 mg/dL    Triglycerides 132 <150 mg/dL    HDL Cholesterol 42 (L) >49 mg/dL    LDL  Cholesterol Calculated 86 <100 mg/dL    Non HDL Cholesterol 112 <130 mg/dL   TSH with free T4 reflex and/or T3 as indicated    Collection Time: 12/22/19  6:51 AM   Result Value Ref Range    TSH 1.23 0.40 - 4.00 mU/L   Folate    Collection Time: 12/22/19  6:51 AM   Result Value Ref Range    Folate 8.5 >5.4 ng/mL   Vitamin B12    Collection Time: 12/22/19  6:51 AM   Result Value Ref Range    Vitamin B12 358 193 - 986 pg/mL   Bilirubin direct    Collection Time: 12/22/19  6:51 AM   Result Value Ref Range    Bilirubin Direct <0.1 0.0 - 0.2 mg/dL   Glucose by meter    Collection Time: 12/22/19 12:17 PM   Result Value Ref Range    Glucose 238 (H) 70 - 99 mg/dL   UA with Microscopic reflex to Culture    Collection Time: 12/22/19  1:15 PM   Result Value Ref Range    Color Urine Yellow     Appearance Urine Clear     Glucose Urine 300 (A) NEG^Negative mg/dL    Bilirubin Urine Negative NEG^Negative    Ketones Urine Negative NEG^Negative mg/dL    Specific Gravity Urine 1.018 1.003 - 1.035    Blood Urine Negative NEG^Negative    pH Urine 6.0 5.0 - 7.0 pH    Protein Albumin Urine Negative NEG^Negative mg/dL    Urobilinogen mg/dL Normal 0.0 - 2.0 mg/dL    Nitrite Urine Negative NEG^Negative    Leukocyte Esterase Urine Negative NEG^Negative    Source Midstream Urine     WBC Urine <1 0 - 5 /HPF    RBC Urine 0 0 - 2 /HPF    Bacteria Urine Few (A) NEG^Negative /HPF    Squamous Epithelial /HPF Urine 1 0 - 1 /HPF   Glucose by meter    Collection Time: 12/22/19  4:32 PM   Result Value Ref Range    Glucose 224 (H) 70 - 99 mg/dL   Glucose by meter    Collection Time: 12/22/19  9:07 PM   Result Value Ref Range    Glucose 204 (H) 70 - 99 mg/dL   Glucose by meter    Collection Time: 12/23/19  2:01 AM   Result Value Ref Range    Glucose 197 (H) 70 - 99 mg/dL   Glucose by meter    Collection Time: 12/23/19  7:57 AM   Result Value Ref Range    Glucose 132 (H) 70 - 99 mg/dL            Psychiatric Examination:   Temp: 97.7  F (36.5  C) Temp src:  "Oral BP: 130/83 Pulse: 83   Resp: 16 SpO2: 98 % O2 Device: None (Room air)    Weight is 239 lbs 4.8 oz  Body mass index is 46.74 kg/m .    Appearance: well groomed, awake, alert, cooperative, no apparent distress and moderately obese  Attitude:  cooperative  Eye Contact:  good  Mood:  good  Affect:  appropriate and in normal range  Speech:  clear, coherent  Psychomotor Behavior:  no evidence of tardive dyskinesia, dystonia, or tics  Throught Process:  logical and goal oriented  Associations:  no loose associations  Thought Content:  no evidence of suicidal ideation or homicidal ideation, no auditory hallucinations present and no visual hallucinations present  Insight:  good  Judgement:  intact  Oriented to:  time, person, and place  Attention Span and Concentration:  intact  Recent and Remote Memory:  intact         Precautions:     Behavioral Orders   Procedures     Code 1 - Restrict to Unit     Fall precautions     Routine Programming     As clinically indicated     Self Injury Precaution     Status 15     Every 15 minutes.     Suicide precautions     Patients on Suicide Precautions should have a Combination Diet ordered that includes a Diet selection(s) AND a Behavioral Tray selection for Safe Tray - with utensils, or Safe Tray - NO utensils            DIagnoses:   1.  Schizoaffective disorder, bipolar type, currently depressed, with psychosis  2.  Borderline intellectual functioning  3.  Polysubstance abuse  4.  Medical problems include morbid obesity, type 2 diabetes, sleep apnea, multiple pain issues.         Plan:   The patient is a very pleasant, -American female who was admitted with increased depression, auditory visual hallucinations, and suicidal thoughts with a plan to overdose on her insulin.  Reported having a \"suicide trials\" by injecting herself with more insulin than she needs.  Reports that her medications are administered by the staff however, she is managing her insulin.  The patient was " not able to identify ago.  Reports that the auditory visual hallucinations have been going on for the last 3 years.  She has been having intrusive thoughts of suicide.  She feels safe on the unit.  The patient was not able to discuss her medications since she does not know what she is taking.  Medication changes will include the following:   --Discontinue Cogentin.    --Start amantadine 100 mg twice a day.   --Decrease gabapentin to 300 mg 3 times a day to reduce sedation.    --Change Invega to 9 mg at bedtime.    --Discontinue the morning dose of Topamax.    --Continue Topamax 2 mg at bedtime.   --Continue Klonopin 0.5 mg at bedtime.    --Continue melatonin 10 mg at bedtime.    --Discontinue naltrexone, the patient has been sober for 27 years.    --Continue Zoloft 200 mg every morning.    --Blood work was reviewed.    --Internal medicine follow-up for medical problems.    --The patient was consulted on nature of illness and treatment options.   --Care was coordinated with the treatment team.     Disposition Plan   Reason for ongoing admission: is unable to care for self due to depression, SI, AH/VH.   Disposition:group home  Estimated length of stay: 5-7 days  Legal Status:  voluntary  Discharge will be granted once symptoms improved.    Dede CORDOVA CNP  Date: 12/23/19  Time: 1:52 PM

## 2019-12-23 NOTE — PROGRESS NOTES
Pt signed MUSA for SHADIA Howard with [facility] ([phone]) and for group home staff/owner Alva. Writer left message at both contact numbers for call back.    Writer left a message at [facility] for a call back. Writer also tried to contact CM Destini Howard again and was directed by the main office to contact supervisor Jolie Aguilar ( [phone]) who is out until Monday 12/30/19.    Alva called back stating they cannot have pt return to their home due to [facility] requirements on independent self monitoring of insulin. Since pt just tried to overdose on insulin, pt cannot self administer insulin.    Pt's son Scot ([phone]) visited the unit sharing the same message as he spoke with Alva. He is concerned about where his mother will live. He is unable to have her live with him. Writer stated we will work with the CM to find new housing.

## 2019-12-23 NOTE — PROGRESS NOTES
BEHAVIORAL TEAM DISCUSSION    Participants: Dede Adams, RN Umu, OT Jessica Moon, SAIRA Kelly  Progress: new admission  Anticipated length of stay: 2-3 days  Continued Stay Criteria/Rationale: new admission  Medical/Physical: DM II  Precautions:   Behavioral Orders   Procedures     Code 1 - Restrict to Unit     Fall precautions     Routine Programming     As clinically indicated     Self Injury Precaution     Status 15     Every 15 minutes.     Suicide precautions     Patients on Suicide Precautions should have a Combination Diet ordered that includes a Diet selection(s) AND a Behavioral Tray selection for Safe Tray - with utensils, or Safe Tray - NO utensils       Plan: Pt will be assessed by psychiatry for safety issues, CTC will contact group home for additional information, meds to be managed per psychiatry, CTC will meet with pt to discuss discharge plans.  Rationale for change in precautions or plan: no change

## 2019-12-23 NOTE — PLAN OF CARE
Problem: OT General Care Plan  Goal: OT Goal 1  Description  Will attend OT groups improve concentration and comfort with engaging in more structured and success oriented options.   Note:   Pt has not attended OT groups yet. They will be encouraged to attend groups and be provided a Self Assessment form.  OT staff will explain the value of OT, including them in their treatment plan and offer options to meet their needs and identify goals.

## 2019-12-24 LAB
GLUCOSE BLDC GLUCOMTR-MCNC: 101 MG/DL (ref 70–99)
GLUCOSE BLDC GLUCOMTR-MCNC: 144 MG/DL (ref 70–99)
GLUCOSE BLDC GLUCOMTR-MCNC: 155 MG/DL (ref 70–99)
GLUCOSE BLDC GLUCOMTR-MCNC: 164 MG/DL (ref 70–99)
GLUCOSE BLDC GLUCOMTR-MCNC: 239 MG/DL (ref 70–99)

## 2019-12-24 PROCEDURE — 12400002 ZZH R&B MH SENIOR/ADOLESCENT

## 2019-12-24 PROCEDURE — 99232 SBSQ HOSP IP/OBS MODERATE 35: CPT | Performed by: NURSE PRACTITIONER

## 2019-12-24 PROCEDURE — 25000132 ZZH RX MED GY IP 250 OP 250 PS 637: Mod: GY | Performed by: PSYCHIATRY & NEUROLOGY

## 2019-12-24 PROCEDURE — 25000132 ZZH RX MED GY IP 250 OP 250 PS 637: Mod: GY | Performed by: NURSE PRACTITIONER

## 2019-12-24 PROCEDURE — H2032 ACTIVITY THERAPY, PER 15 MIN: HCPCS

## 2019-12-24 PROCEDURE — 00000146 ZZHCL STATISTIC GLUCOSE BY METER IP

## 2019-12-24 RX ADMIN — GABAPENTIN 300 MG: 300 CAPSULE ORAL at 20:54

## 2019-12-24 RX ADMIN — SENNOSIDES AND DOCUSATE SODIUM 1 TABLET: 8.6; 5 TABLET ORAL at 13:54

## 2019-12-24 RX ADMIN — CLONAZEPAM 0.5 MG: 0.5 TABLET ORAL at 20:53

## 2019-12-24 RX ADMIN — GABAPENTIN 300 MG: 300 CAPSULE ORAL at 13:54

## 2019-12-24 RX ADMIN — INSULIN ASPART 1 UNITS: 100 INJECTION, SOLUTION INTRAVENOUS; SUBCUTANEOUS at 13:52

## 2019-12-24 RX ADMIN — SERTRALINE HYDROCHLORIDE 200 MG: 100 TABLET ORAL at 09:09

## 2019-12-24 RX ADMIN — FAMOTIDINE 40 MG: 20 TABLET ORAL at 20:52

## 2019-12-24 RX ADMIN — ATORVASTATIN CALCIUM 80 MG: 80 TABLET, FILM COATED ORAL at 09:09

## 2019-12-24 RX ADMIN — PALIPERIDONE 9 MG: 9 TABLET, EXTENDED RELEASE ORAL at 20:52

## 2019-12-24 RX ADMIN — Medication 10 MG: at 20:52

## 2019-12-24 RX ADMIN — ASPIRIN 81 MG: 81 TABLET ORAL at 09:09

## 2019-12-24 RX ADMIN — AMANTADINE HYDROCHLORIDE 100 MG: 100 CAPSULE ORAL at 20:53

## 2019-12-24 RX ADMIN — NYSTATIN: 100000 CREAM TOPICAL at 20:54

## 2019-12-24 RX ADMIN — TOPIRAMATE 50 MG: 50 TABLET ORAL at 20:54

## 2019-12-24 RX ADMIN — INSULIN ASPART 1 UNITS: 100 INJECTION, SOLUTION INTRAVENOUS; SUBCUTANEOUS at 17:31

## 2019-12-24 RX ADMIN — AMANTADINE HYDROCHLORIDE 100 MG: 100 CAPSULE ORAL at 09:09

## 2019-12-24 RX ADMIN — GABAPENTIN 300 MG: 300 CAPSULE ORAL at 09:09

## 2019-12-24 ASSESSMENT — ACTIVITIES OF DAILY LIVING (ADL)
DRESS: INDEPENDENT
DRESS: INDEPENDENT
ORAL_HYGIENE: INDEPENDENT
ORAL_HYGIENE: INDEPENDENT
LAUNDRY: UNABLE TO COMPLETE
LAUNDRY: WITH SUPERVISION
HYGIENE/GROOMING: INDEPENDENT
HYGIENE/GROOMING: INDEPENDENT

## 2019-12-24 ASSESSMENT — MIFFLIN-ST. JEOR: SCORE: 1571.99

## 2019-12-24 NOTE — PROGRESS NOTES
Brief Medicine Note    Reviewed BG and continue to be labile after increasing lantus to 40u with a.m. glucose of 100, PTA lantus 60 units    Reportedly patient presented to the psychiatry unit after attempted to overdose on her home insulin.    Today's vital signs, medications, and nursing notes were reviewed.     /74   Pulse 77   Temp 97.9  F (36.6  C) (Oral)   Resp 16   Ht 1.524 m (5')   Wt 108.5 kg (239 lb 4.8 oz)   LMP 01/06/2015   SpO2 96%   BMI 46.74 kg/m    General: A&O. NAD.     A/P:  DMII  Hyperglycemia  -initially considered increased lantus back to PTA dose of 60 units as pt is hyperglycemic to 151-300 and taking 2+L po/d--however:  -Given potential dangers of increasing patient's insulin dose at this time we will continue with 30 unit dose  -Consider discontinuing insulin if patient is determined to be at persistent risk of overdose  -We will need to follow-up with her primary care provider within 1 week of discharge.    Laura Young PA-C  Hospitalist Service  Pager: [phone]

## 2019-12-24 NOTE — PROGRESS NOTES
12/23/19 9867   Behavioral Health   Hallucinations denies / not responding to hallucinations   Thinking poor concentration   Orientation person: oriented;place: oriented;date: oriented;time: oriented   Memory baseline memory   Insight poor   Judgement impaired   Eye Contact at examiner   Affect blunted, flat   Mood mood is calm   Physical Appearance/Attire attire appropriate to age and situation   Hygiene well groomed   Suicidality other (see comments)  (Denied)   1. Wish to be Dead (Recent) No   2. Non-Specific Active Suicidal Thoughts (Recent) No   Self Injury other (see comment)  (Denied)   Elopement   (None Observed)   Activity withdrawn;other (see comment)  (Present at the milieu for the majority of the shift)   Speech clear;coherent   Medication Sensitivity sedation   Psychomotor / Gait unsteady;slow   Psycho Education   Type of Intervention 1:1 intervention   Response participates, initiates socially appropriate   Hours 0.5   Treatment Detail Check-In   Activities of Daily Living   Hygiene/Grooming independent   Oral Hygiene independent   Dress independent   Laundry with supervision   Room Organization independent     Pt was present in the milieu for the majority of the shift. While in the milieu, pt had minimal social interactions with peers and staff. Pt stated she felt anxious because she found out today that she is not going back to her place of resident. Pt denied feeling depressed or having SI/SIB thoughts. However, pt had a blunt affect throughout the shift and appeared to be depressed.

## 2019-12-24 NOTE — PROGRESS NOTES
12/23/19 1745 12/23/19 2000 12/23/19 2201   Intake (mL)   P.O. 620 mL  --  850 mL   Intake (%) 75%  --   --    Appetite Good  --   --    Carbohydrate Intake (gm) 79 gm 28 gm  --

## 2019-12-24 NOTE — PROGRESS NOTES
"[facility], [place]   Psychiatric Progress Note        Interim History:   From H&P: Nena Tang is a 59 year old female with history of schizoaffective disorder, polysubstance abuse, type 2 diabetes, sleep apnea, and borderline intellectual functioning.  The patient went to the emergency room for evaluation of increased depression, and suicidal ideation.  The patient lives in a group home.  Reported having increasing thoughts of cutting or overdosing on insulin as a suicide attempt.  The patient reported that for several days, she has been taking extra insulin \"as a practice attempt\".  She reported having auditory hallucinations of seeing a man in in [place] and red eyes telling her to kill herself if she uses her CPAP machines.  She stopped using the CPAP.  She reported that the holidays are difficult for her as she lost multiple family members around this time of the year.    The patient's care was discussed with the treatment team during the daily team meeting and/or staff's chart notes were reviewed.  Staff report patient has been calm, pleasant, cooperative. Has been sleeping on and off during the day and evening. Feels dizzy, some meds were held. Passive SI continues.  Patient is not attending groups. Patient is eating well and taking medications as prescribed. Slept 8 hours.      Met with patient. Doing well. No concerns besides feeling tired. She is upset about not being able to return to her group home, \"theres is too much going on\".  The group home is refusing to take her back home due to risk of misusing her insuline.            Medications:       amantadine  100 mg Oral BID     aspirin  81 mg Oral Daily     atorvastatin  80 mg Oral Daily     clonazePAM  0.5 mg Oral At Bedtime     famotidine  40 mg Oral At Bedtime     gabapentin  300 mg Oral TID     insulin aspart  1-7 Units Subcutaneous TID AC     insulin aspart  1-5 Units Subcutaneous At Bedtime     insulin glargine  60 " Units Subcutaneous At Bedtime     losartan  50 mg Oral Daily     melatonin  10 mg Oral At Bedtime     metoprolol succinate ER  25 mg Oral Daily     nystatin   Topical BID     paliperidone ER  9 mg Oral At Bedtime     sertraline  200 mg Oral Daily     topiramate  50 mg Oral At Bedtime          Allergies:     Allergies   Allergen Reactions     Imidazole Antifungals Hives     Tolerates diflucan     Ketoprofen Itching     Pruritis to topical     Lisinopril Hives     Metformin Other (See Comments)     Patient hospitalized for lactic acidosis - admitting provider suspectd caused by metformin     Metronidazole Hives     Posaconazole Hives     Tolerates diflucan          Labs:     Recent Results (from the past 672 hour(s))   Hemoglobin A1c    Collection Time: 12/03/19 11:43 AM   Result Value Ref Range    Hemoglobin A1C 6.2 (H) 0 - 5.6 %   Comprehensive metabolic panel (BMP + Alb, Alk Phos, ALT, AST, Total. Bili, TP)    Collection Time: 12/03/19 11:43 AM   Result Value Ref Range    Sodium 139 133 - 144 mmol/L    Potassium 4.1 3.4 - 5.3 mmol/L    Chloride 110 (H) 94 - 109 mmol/L    Carbon Dioxide 21 20 - 32 mmol/L    Anion Gap 8 3 - 14 mmol/L    Glucose 146 (H) 70 - 99 mg/dL    Urea Nitrogen 16 7 - 30 mg/dL    Creatinine 0.91 0.52 - 1.04 mg/dL    GFR Estimate 69 >60 mL/min/[1.73_m2]    GFR Estimate If Black 80 >60 mL/min/[1.73_m2]    Calcium 8.9 8.5 - 10.1 mg/dL    Bilirubin Total 0.2 0.2 - 1.3 mg/dL    Albumin 3.4 3.4 - 5.0 g/dL    Protein Total 7.7 6.8 - 8.8 g/dL    Alkaline Phosphatase 90 40 - 150 U/L    ALT 26 0 - 50 U/L    AST 15 0 - 45 U/L   Drug Abuse Screen Panel 13, Urine (Pain Care Package)    Collection Time: 12/03/19 12:11 PM   Result Value Ref Range    Cannabinoids (93-[id number]-2-carboxy-9-THC) Not Detected NDET^Not Detected ng/mL    Phencyclidine (Phencyclidine) Not Detected NDET^Not Detected ng/mL    Cocaine (Benzoylecgonine) Not Detected NDET^Not Detected ng/mL    Methamphetamine (d-Methamphetamine) Not Detected  NDET^Not Detected ng/mL    Opiates (Morphine) Not Detected NDET^Not Detected ng/mL    Amphetamine (d-Amphetamine) Not Detected NDET^Not Detected ng/mL    Benzodiazepines (Nordiazepam) Not Detected NDET^Not Detected ng/mL    Tricyclic Antidepressants (Desipramine) Not Detected NDET^Not Detected ng/mL    Methadone (Methadone) Not Detected NDET^Not Detected ng/mL    Barbiturates (Butalbital) Not Detected NDET^Not Detected ng/mL    Oxycodone (Oxycodone) Not Detected NDET^Not Detected ng/mL    Propoxyphene (Norpropoxyphene) Not Detected NDET^Not Detected ng/mL    Buprenorphine (Buprenorphine) Not Detected NDET^Not Detected ng/mL   Glucose by meter    Collection Time: 12/20/19  4:04 PM   Result Value Ref Range    Glucose 219 (H) 70 - 99 mg/dL   Drug abuse screen 6 urine (chem dep)    Collection Time: 12/20/19  4:19 PM   Result Value Ref Range    Amphetamine Qual Urine Negative NEG^Negative    Barbiturates Qual Urine Negative NEG^Negative    Benzodiazepine Qual Urine Negative NEG^Negative    Cannabinoids Qual Urine Negative NEG^Negative    Cocaine Qual Urine Negative NEG^Negative    Ethanol Qual Urine Negative NEG^Negative    Opiates Qualitative Urine Negative NEG^Negative   Glucose by meter    Collection Time: 12/20/19  8:56 PM   Result Value Ref Range    Glucose 110 (H) 70 - 99 mg/dL   Glucose by meter    Collection Time: 12/20/19 11:13 PM   Result Value Ref Range    Glucose 190 (H) 70 - 99 mg/dL   Glucose by meter    Collection Time: 12/21/19  2:05 AM   Result Value Ref Range    Glucose 177 (H) 70 - 99 mg/dL   Glucose by meter    Collection Time: 12/21/19  7:45 AM   Result Value Ref Range    Glucose 148 (H) 70 - 99 mg/dL   Glucose by meter    Collection Time: 12/21/19 11:09 AM   Result Value Ref Range    Glucose 134 (H) 70 - 99 mg/dL   Glucose by meter    Collection Time: 12/21/19 12:26 PM   Result Value Ref Range    Glucose 123 (H) 70 - 99 mg/dL   Glucose by meter    Collection Time: 12/21/19  5:13 PM   Result Value  Ref Range    Glucose 130 (H) 70 - 99 mg/dL   Glucose by meter    Collection Time: 12/21/19  9:57 PM   Result Value Ref Range    Glucose 158 (H) 70 - 99 mg/dL   Glucose by meter    Collection Time: 12/22/19  1:47 AM   Result Value Ref Range    Glucose 110 (H) 70 - 99 mg/dL   CBC with platelets differential    Collection Time: 12/22/19  6:51 AM   Result Value Ref Range    WBC 7.5 4.0 - 11.0 10e9/L    RBC Count 3.57 (L) 3.8 - 5.2 10e12/L    Hemoglobin 10.8 (L) 11.7 - 15.7 g/dL    Hematocrit 34.9 (L) 35.0 - 47.0 %    MCV 98 78 - 100 fl    MCH 30.3 26.5 - 33.0 pg    MCHC 30.9 (L) 31.5 - 36.5 g/dL    RDW 13.6 10.0 - 15.0 %    Platelet Count 221 150 - 450 10e9/L    Diff Method Automated Method     % Neutrophils 49.9 %    % Lymphocytes 40.2 %    % Monocytes 8.0 %    % Eosinophils 1.2 %    % Basophils 0.3 %    % Immature Granulocytes 0.4 %    Nucleated RBCs 0 0 /100    Absolute Neutrophil 3.8 1.6 - 8.3 10e9/L    Absolute Lymphocytes 3.0 0.8 - 5.3 10e9/L    Absolute Monocytes 0.6 0.0 - 1.3 10e9/L    Absolute Eosinophils 0.1 0.0 - 0.7 10e9/L    Absolute Basophils 0.0 0.0 - 0.2 10e9/L    Abs Immature Granulocytes 0.0 0 - 0.4 10e9/L    Absolute Nucleated RBC 0.0    Comprehensive metabolic panel    Collection Time: 12/22/19  6:51 AM   Result Value Ref Range    Sodium 141 133 - 144 mmol/L    Potassium 4.1 3.4 - 5.3 mmol/L    Chloride 111 (H) 94 - 109 mmol/L    Carbon Dioxide 22 20 - 32 mmol/L    Anion Gap 8 3 - 14 mmol/L    Glucose 111 (H) 70 - 99 mg/dL    Urea Nitrogen 16 7 - 30 mg/dL    Creatinine 0.86 0.52 - 1.04 mg/dL    GFR Estimate 74 >60 mL/min/[1.73_m2]    GFR Estimate If Black 85 >60 mL/min/[1.73_m2]    Calcium 8.7 8.5 - 10.1 mg/dL    Bilirubin Total 0.2 0.2 - 1.3 mg/dL    Albumin 3.2 (L) 3.4 - 5.0 g/dL    Protein Total 7.5 6.8 - 8.8 g/dL    Alkaline Phosphatase 88 40 - 150 U/L    ALT 22 0 - 50 U/L    AST 8 0 - 45 U/L   Lipid panel    Collection Time: 12/22/19  6:51 AM   Result Value Ref Range    Cholesterol 154 <200  mg/dL    Triglycerides 132 <150 mg/dL    HDL Cholesterol 42 (L) >49 mg/dL    LDL Cholesterol Calculated 86 <100 mg/dL    Non HDL Cholesterol 112 <130 mg/dL   TSH with free T4 reflex and/or T3 as indicated    Collection Time: 12/22/19  6:51 AM   Result Value Ref Range    TSH 1.23 0.40 - 4.00 mU/L   Folate    Collection Time: 12/22/19  6:51 AM   Result Value Ref Range    Folate 8.5 >5.4 ng/mL   Vitamin B12    Collection Time: 12/22/19  6:51 AM   Result Value Ref Range    Vitamin B12 358 193 - 986 pg/mL   Vitamin D    Collection Time: 12/22/19  6:51 AM   Result Value Ref Range    Vitamin D Deficiency screening 39 20 - 75 ug/L   Bilirubin direct    Collection Time: 12/22/19  6:51 AM   Result Value Ref Range    Bilirubin Direct <0.1 0.0 - 0.2 mg/dL   Glucose by meter    Collection Time: 12/22/19 12:17 PM   Result Value Ref Range    Glucose 238 (H) 70 - 99 mg/dL   UA with Microscopic reflex to Culture    Collection Time: 12/22/19  1:15 PM   Result Value Ref Range    Color Urine Yellow     Appearance Urine Clear     Glucose Urine 300 (A) NEG^Negative mg/dL    Bilirubin Urine Negative NEG^Negative    Ketones Urine Negative NEG^Negative mg/dL    Specific Gravity Urine 1.018 1.003 - 1.035    Blood Urine Negative NEG^Negative    pH Urine 6.0 5.0 - 7.0 pH    Protein Albumin Urine Negative NEG^Negative mg/dL    Urobilinogen mg/dL Normal 0.0 - 2.0 mg/dL    Nitrite Urine Negative NEG^Negative    Leukocyte Esterase Urine Negative NEG^Negative    Source Midstream Urine     WBC Urine <1 0 - 5 /HPF    RBC Urine 0 0 - 2 /HPF    Bacteria Urine Few (A) NEG^Negative /HPF    Squamous Epithelial /HPF Urine 1 0 - 1 /HPF   Glucose by meter    Collection Time: 12/22/19  4:32 PM   Result Value Ref Range    Glucose 224 (H) 70 - 99 mg/dL   Glucose by meter    Collection Time: 12/22/19  9:07 PM   Result Value Ref Range    Glucose 204 (H) 70 - 99 mg/dL   Glucose by meter    Collection Time: 12/23/19  2:01 AM   Result Value Ref Range    Glucose 197  (H) 70 - 99 mg/dL   Glucose by meter    Collection Time: 12/23/19  7:57 AM   Result Value Ref Range    Glucose 132 (H) 70 - 99 mg/dL   Glucose by meter    Collection Time: 12/23/19 11:40 AM   Result Value Ref Range    Glucose 194 (H) 70 - 99 mg/dL   Glucose by meter    Collection Time: 12/23/19  4:43 PM   Result Value Ref Range    Glucose 151 (H) 70 - 99 mg/dL   Glucose by meter    Collection Time: 12/23/19  8:04 PM   Result Value Ref Range    Glucose 301 (H) 70 - 99 mg/dL   Glucose by meter    Collection Time: 12/23/19  9:12 PM   Result Value Ref Range    Glucose 286 (H) 70 - 99 mg/dL   Glucose by meter    Collection Time: 12/24/19  1:48 AM   Result Value Ref Range    Glucose 164 (H) 70 - 99 mg/dL            Psychiatric Examination:   Temp: 97.9  F (36.6  C) Temp src: Oral BP: 121/74 Pulse: 77     SpO2: 96 % O2 Device: None (Room air)    Weight is 239 lbs 4.8 oz  Body mass index is 46.74 kg/m .    Appearance: well groomed, awake, alert, cooperative, no apparent distress and moderately obese  Attitude:  cooperative  Eye Contact:  good  Mood:  good  Affect:  appropriate and in normal range  Speech:  clear, coherent  Psychomotor Behavior:  no evidence of tardive dyskinesia, dystonia, or tics  Throught Process:  logical and goal oriented  Associations:  no loose associations  Thought Content:  no evidence of suicidal ideation or homicidal ideation, no auditory hallucinations present and no visual hallucinations present  Insight:  good  Judgement:  intact  Oriented to:  time, person, and place  Attention Span and Concentration:  intact  Recent and Remote Memory:  intact         Precautions:     Behavioral Orders   Procedures     Code 1 - Restrict to Unit     Fall precautions     Routine Programming     As clinically indicated     Self Injury Precaution     Status 15     Every 15 minutes.     Suicide precautions     Patients on Suicide Precautions should have a Combination Diet ordered that includes a Diet selection(s)  "AND a Behavioral Tray selection for Safe Tray - with utensils, or Safe Tray - NO utensils            DIagnoses:   1.  Schizoaffective disorder, bipolar type, currently depressed, with psychosis  2.  Borderline intellectual functioning  3.  Polysubstance abuse  4.  Medical problems include morbid obesity, type 2 diabetes, sleep apnea, multiple pain issues.         Plan:   The patient is a very pleasant, -American female who was admitted with increased depression, auditory visual hallucinations, and suicidal thoughts with a plan to overdose on her insulin.  Reported having a \"suicide trials\" by injecting herself with more insulin than she needs.  Reports that her medications are administered by the staff however, she is managing her insulin.  The patient was not able to identify ago.  Reports that the auditory visual hallucinations have been going on for the last 3 years.  She has been having intrusive thoughts of suicide.  She feels safe on the unit.  The patient was not able to discuss her medications since she does not know what she is taking.  Medication changes will include the following:   --Discontinue Cogentin.    --Start amantadine 100 mg twice a day.   --Decrease gabapentin to 300 mg 3 times a day to reduce sedation.    --Change Invega to 9 mg at bedtime.    --Discontinue the morning dose of Topamax.    --Continue Topamax 2 mg at bedtime.   --Continue Klonopin 0.5 mg at bedtime.    --Continue melatonin 10 mg at bedtime.    --Discontinue naltrexone, the patient has been sober for 27 years.    --Continue Zoloft 200 mg every morning.    --Blood work was reviewed.    --Internal medicine follow-up for medical problems.    --The patient was consulted on nature of illness and treatment options.   --Care was coordinated with the treatment team.     Disposition Plan   Reason for ongoing admission: is unable to care for self due to depression, SI, AH/VH.   Disposition:group home  Estimated length of stay: 5-7 " days  Legal Status:  voluntary  Discharge will be granted once symptoms improved.    Dede CORDOVA CNP  Date: 12/24/19  Time: 12:35 PM

## 2019-12-25 LAB
GLUCOSE BLDC GLUCOMTR-MCNC: 111 MG/DL (ref 70–99)
GLUCOSE BLDC GLUCOMTR-MCNC: 147 MG/DL (ref 70–99)
GLUCOSE BLDC GLUCOMTR-MCNC: 199 MG/DL (ref 70–99)
GLUCOSE BLDC GLUCOMTR-MCNC: 338 MG/DL (ref 70–99)
GLUCOSE BLDC GLUCOMTR-MCNC: 352 MG/DL (ref 70–99)
GLUCOSE BLDC GLUCOMTR-MCNC: 355 MG/DL (ref 70–99)

## 2019-12-25 PROCEDURE — 12400002 ZZH R&B MH SENIOR/ADOLESCENT

## 2019-12-25 PROCEDURE — 25000132 ZZH RX MED GY IP 250 OP 250 PS 637: Mod: GY | Performed by: NURSE PRACTITIONER

## 2019-12-25 PROCEDURE — 25000132 ZZH RX MED GY IP 250 OP 250 PS 637: Mod: GY | Performed by: PSYCHIATRY & NEUROLOGY

## 2019-12-25 PROCEDURE — 00000146 ZZHCL STATISTIC GLUCOSE BY METER IP

## 2019-12-25 RX ADMIN — GABAPENTIN 300 MG: 300 CAPSULE ORAL at 20:28

## 2019-12-25 RX ADMIN — ATORVASTATIN CALCIUM 80 MG: 80 TABLET, FILM COATED ORAL at 08:45

## 2019-12-25 RX ADMIN — CLONAZEPAM 0.5 MG: 0.5 TABLET ORAL at 20:28

## 2019-12-25 RX ADMIN — FAMOTIDINE 40 MG: 20 TABLET ORAL at 20:27

## 2019-12-25 RX ADMIN — SERTRALINE HYDROCHLORIDE 200 MG: 100 TABLET ORAL at 08:45

## 2019-12-25 RX ADMIN — AMANTADINE HYDROCHLORIDE 100 MG: 100 CAPSULE ORAL at 20:27

## 2019-12-25 RX ADMIN — GABAPENTIN 300 MG: 300 CAPSULE ORAL at 15:00

## 2019-12-25 RX ADMIN — GABAPENTIN 300 MG: 300 CAPSULE ORAL at 08:45

## 2019-12-25 RX ADMIN — AMANTADINE HYDROCHLORIDE 100 MG: 100 CAPSULE ORAL at 08:45

## 2019-12-25 RX ADMIN — TOPIRAMATE 50 MG: 50 TABLET ORAL at 20:27

## 2019-12-25 RX ADMIN — PALIPERIDONE 9 MG: 9 TABLET, EXTENDED RELEASE ORAL at 20:27

## 2019-12-25 RX ADMIN — INSULIN ASPART 2 UNITS: 100 INJECTION, SOLUTION INTRAVENOUS; SUBCUTANEOUS at 12:18

## 2019-12-25 RX ADMIN — ASPIRIN 81 MG: 81 TABLET ORAL at 08:45

## 2019-12-25 RX ADMIN — Medication 10 MG: at 20:27

## 2019-12-25 RX ADMIN — INSULIN ASPART 5 UNITS: 100 INJECTION, SOLUTION INTRAVENOUS; SUBCUTANEOUS at 17:06

## 2019-12-25 RX ADMIN — NYSTATIN: 100000 CREAM TOPICAL at 08:46

## 2019-12-25 ASSESSMENT — ACTIVITIES OF DAILY LIVING (ADL)
ORAL_HYGIENE: INDEPENDENT
DRESS: INDEPENDENT
LAUNDRY: UNABLE TO COMPLETE
HYGIENE/GROOMING: INDEPENDENT

## 2019-12-25 NOTE — PROGRESS NOTES
Pt's BG was 147 at 0206.  Pt appears to have slept 10.25 hours.  Will continue to monitor and support plan of care.

## 2019-12-25 NOTE — PROGRESS NOTES
"Pt has been present in milieu most of shift.  She states that she is no longer suicidal.  \"I have my kids and grand kids to live for.\"  \"I won't do that\"    Pt is sad that she is no longer able to go her group home and needs new placement.  She is hopeful to find something that she likes.    She admits that she still feels mood is somewhat depressed, fair sleep, and fair appetite.  Med compliant.  Pleasant, quiet.    "

## 2019-12-25 NOTE — PROGRESS NOTES
Participated in Music Therapy group with focus on mood elevation, validation and decreasing anxiety and improved group cohesiveness. Engaged and cooperative in music listening interventions.   Showed progress in session goals.     Likes/responds well to r & b music, and [employer] songs.

## 2019-12-25 NOTE — PROGRESS NOTES
BG today 111-239 on 30 units Lantus    Per sliding scale is typically only needing 1-2 units    If discharging soon, would continue the Lantus 30 units nightly if deemed safe and appropriate per psychiatry (given overdose history) and hold off on Novolog     Will need follow up with PCP or endocrinologist (no established endocrinologist of record) for fine-tuning regiment

## 2019-12-25 NOTE — PROGRESS NOTES
12/24/19 2100   Behavioral Health   Hallucinations denies / not responding to hallucinations   Thinking intact   Orientation person: oriented;place: oriented;date: oriented;time: oriented   Memory baseline memory   Insight insight appropriate to situation;insight appropriate to events   Judgement intact   Eye Contact at examiner   Affect blunted, flat   Mood depressed   Physical Appearance/Attire attire appropriate to age and situation   Hygiene well groomed   Suicidality other (see comments)  (denies)   1. Wish to be Dead (Recent) No   2. Non-Specific Active Suicidal Thoughts (Recent) No   Speech clear;coherent   Medication Sensitivity no observed side effects;no stated side effects   Psychomotor / Gait balanced;steady   Pt actively participated in the group and out in the lounge for a large part of shift. She presented with blunted affect and did not socialize with others. No concerns to note.

## 2019-12-26 LAB
GLUCOSE BLDC GLUCOMTR-MCNC: 139 MG/DL (ref 70–99)
GLUCOSE BLDC GLUCOMTR-MCNC: 194 MG/DL (ref 70–99)
GLUCOSE BLDC GLUCOMTR-MCNC: 206 MG/DL (ref 70–99)
GLUCOSE BLDC GLUCOMTR-MCNC: 223 MG/DL (ref 70–99)
GLUCOSE BLDC GLUCOMTR-MCNC: 337 MG/DL (ref 70–99)

## 2019-12-26 PROCEDURE — 25000132 ZZH RX MED GY IP 250 OP 250 PS 637: Mod: GY | Performed by: NURSE PRACTITIONER

## 2019-12-26 PROCEDURE — 25000132 ZZH RX MED GY IP 250 OP 250 PS 637: Mod: GY | Performed by: PSYCHIATRY & NEUROLOGY

## 2019-12-26 PROCEDURE — 99232 SBSQ HOSP IP/OBS MODERATE 35: CPT | Performed by: NURSE PRACTITIONER

## 2019-12-26 PROCEDURE — 12400002 ZZH R&B MH SENIOR/ADOLESCENT

## 2019-12-26 PROCEDURE — 00000146 ZZHCL STATISTIC GLUCOSE BY METER IP

## 2019-12-26 RX ADMIN — ATORVASTATIN CALCIUM 80 MG: 80 TABLET, FILM COATED ORAL at 08:57

## 2019-12-26 RX ADMIN — GABAPENTIN 300 MG: 300 CAPSULE ORAL at 13:05

## 2019-12-26 RX ADMIN — AMANTADINE HYDROCHLORIDE 100 MG: 100 CAPSULE ORAL at 08:55

## 2019-12-26 RX ADMIN — CLONAZEPAM 0.5 MG: 0.5 TABLET ORAL at 20:26

## 2019-12-26 RX ADMIN — GABAPENTIN 300 MG: 300 CAPSULE ORAL at 08:55

## 2019-12-26 RX ADMIN — AMANTADINE HYDROCHLORIDE 100 MG: 100 CAPSULE ORAL at 20:25

## 2019-12-26 RX ADMIN — SERTRALINE HYDROCHLORIDE 200 MG: 100 TABLET ORAL at 08:55

## 2019-12-26 RX ADMIN — INSULIN ASPART 2 UNITS: 100 INJECTION, SOLUTION INTRAVENOUS; SUBCUTANEOUS at 14:09

## 2019-12-26 RX ADMIN — GABAPENTIN 300 MG: 300 CAPSULE ORAL at 20:24

## 2019-12-26 RX ADMIN — TOPIRAMATE 50 MG: 50 TABLET ORAL at 20:29

## 2019-12-26 RX ADMIN — Medication 10 MG: at 20:27

## 2019-12-26 RX ADMIN — HYDROXYZINE HYDROCHLORIDE 25 MG: 25 TABLET, FILM COATED ORAL at 14:17

## 2019-12-26 RX ADMIN — PALIPERIDONE 9 MG: 9 TABLET, EXTENDED RELEASE ORAL at 20:34

## 2019-12-26 RX ADMIN — ASPIRIN 81 MG: 81 TABLET ORAL at 08:55

## 2019-12-26 RX ADMIN — FAMOTIDINE 40 MG: 20 TABLET ORAL at 20:26

## 2019-12-26 RX ADMIN — INSULIN ASPART 2 UNITS: 100 INJECTION, SOLUTION INTRAVENOUS; SUBCUTANEOUS at 17:09

## 2019-12-26 ASSESSMENT — ACTIVITIES OF DAILY LIVING (ADL)
ORAL_HYGIENE: INDEPENDENT
HYGIENE/GROOMING: INDEPENDENT
DRESS: INDEPENDENT

## 2019-12-26 NOTE — PLAN OF CARE
Problem: OT General Care Plan  Goal: OT Goal 1  Description  Will attend OT groups improve concentration and comfort with engaging in more structured and success oriented options.   Note:   Attended the OT group late and watched peers working with peers and that required using complex strategies with visuospatial problem solving. She was pleasant on approach, sat quietly and declined participation stating comfort in preference in just being present in group at this time. Will encourage attendance and support all efforts of attendance and participation.

## 2019-12-26 NOTE — PROGRESS NOTES
BG in even >300 on 30 units lantus, needs dose increase, increased to 50 units as was previously tolerating 40 units    Benefits at this time outweigh risks    Continue lantus on discharge, safety to be determined per psychiatry

## 2019-12-26 NOTE — PLAN OF CARE
"  Problem: Suicidal Behavior  Goal: Suicidal Behavior is Absent or Managed  Outcome: Improving    48-hour Nursing Assessment:    Patient is visible in the milieu but withdrawn. Her mood is calm with a flat affect however, brighter on approach. Patient is pleasant and cooperative to the plan of care. She is med compliant. Patient reports that she sleeps better at night with an average of 6.5 hours during night time and 3 hours during the evening shift. Patient denies SI/SIB nor hallucinations. She denies any racing thoughts. There are times, according to her that she feels tired that sometimes, she could not attend groups otherwise she is compliant in terms of attending. Patient verbalizes that she still is worried about her placement because she could not go back to her group home. Patient is hopeful for a suitable place for her after discharge.    Patient's BS before dinner today was 352. Novolog 5 Units given subcutaneously in the patient's left arm. Writer notified the On-call Hospitalist through [employer] of the patient's BS reading. Patient stated that she ate one large \"[employer] kate\" and a pop brought by her son. Will continue to monitor patient's BS.     BS at HS was 338. Patient reported that she drank a glass of cranberry juice and ate cookies served as HS snacks. Novolog 3 Units given subcutaneously. Patient denies any dizziness; lethargy nor other related symptoms of high BS.    9:45 p.m. BS rechecked after the Novolog was given 355. Hospitalist notified and said to continue to monitor the BS. Next check will be at 0200.     "

## 2019-12-26 NOTE — PROGRESS NOTES
Patient was referred to the following Skilled Nursing Facilities:    [facility]    [facility]    [facility]    [facility]    [facility]    [facility]    Update:    [facility] has no beds    Phone call from Rashawn ([phone]) reporting that [facility] is reviewing patient's referral. She had additional questions which were answered.    [facility] called to report they do not have an appropriate bed.    Phone call from Agatha De Luna of [facility] ([phone]) asking for return call to answer questions.    Second call from Rashawn reporting that [facility]"" would like to offer patient a shared long-term room. Writer returned call and LVM that we are interested.

## 2019-12-26 NOTE — PROGRESS NOTES
RN held blood pressure medication due to continued orthostatic drop. Nursing to measure fluid intake but not output per provider.    Pt endorses depression & anxiety, stating she doesn't feel much better since admission. She did take offered hydroxyzine after some education about it. She has passive SI, but no plan and The patient verbally contracts for safety; will come to staff with any suicidal thoughts, urges or plans.    Pt did not attend groups today .Son Scot visited     [facility] complains of stomach pain [chronic per group home], but refuses Maalox or Tylenol.     Drank 700 mL on day shift.

## 2019-12-26 NOTE — PROGRESS NOTES
"[facility], [place]   Psychiatric Progress Note        Interim History:   From H&P: Nena Tang is a 59 year old female with history of schizoaffective disorder, polysubstance abuse, type 2 diabetes, sleep apnea, and borderline intellectual functioning.  The patient went to the emergency room for evaluation of increased depression, and suicidal ideation.  The patient lives in a group home.  Reported having increasing thoughts of cutting or overdosing on insulin as a suicide attempt.  The patient reported that for several days, she has been taking extra insulin \"as a practice attempt\".  She reported having auditory hallucinations of seeing a man in in [place] and red eyes telling her to kill herself if she uses her CPAP machines.  She stopped using the CPAP.  She reported that the holidays are difficult for her as she lost multiple family members around this time of the year.    The patient's care was discussed with the treatment team during the daily team meeting and/or staff's chart notes were reviewed.  Staff report patient has been calm, pleasant, cooperative. Has been sleeping on and off during the day and evening. Denies SI. Visible in the milieu but keeping to herself.  Patient is eating well and taking medications as prescribed. Slept 8 hours.      Met with patient. Main concern is poor appetite. States that she worries about not having a place to stay that causes stomach problems. Other than that, mood is good. Looks tired. Will consider further decreasing the dose of Gabapentin.            Medications:       amantadine  100 mg Oral BID     aspirin  81 mg Oral Daily     atorvastatin  80 mg Oral Daily     clonazePAM  0.5 mg Oral At Bedtime     famotidine  40 mg Oral At Bedtime     gabapentin  300 mg Oral TID     insulin aspart  1-7 Units Subcutaneous TID AC     insulin aspart  1-5 Units Subcutaneous At Bedtime     insulin glargine  30 Units Subcutaneous At Bedtime     losartan  " 50 mg Oral Daily     melatonin  10 mg Oral At Bedtime     metoprolol succinate ER  25 mg Oral Daily     nystatin   Topical BID     paliperidone ER  9 mg Oral At Bedtime     sertraline  200 mg Oral Daily     topiramate  50 mg Oral At Bedtime          Allergies:     Allergies   Allergen Reactions     Imidazole Antifungals Hives     Tolerates diflucan     Ketoprofen Itching     Pruritis to topical     Lisinopril Hives     Metformin Other (See Comments)     Patient hospitalized for lactic acidosis - admitting provider suspectd caused by metformin     Metronidazole Hives     Posaconazole Hives     Tolerates diflucan          Labs:     Recent Results (from the past 672 hour(s))   Hemoglobin A1c    Collection Time: 12/03/19 11:43 AM   Result Value Ref Range    Hemoglobin A1C 6.2 (H) 0 - 5.6 %   Comprehensive metabolic panel (BMP + Alb, Alk Phos, ALT, AST, Total. Bili, TP)    Collection Time: 12/03/19 11:43 AM   Result Value Ref Range    Sodium 139 133 - 144 mmol/L    Potassium 4.1 3.4 - 5.3 mmol/L    Chloride 110 (H) 94 - 109 mmol/L    Carbon Dioxide 21 20 - 32 mmol/L    Anion Gap 8 3 - 14 mmol/L    Glucose 146 (H) 70 - 99 mg/dL    Urea Nitrogen 16 7 - 30 mg/dL    Creatinine 0.91 0.52 - 1.04 mg/dL    GFR Estimate 69 >60 mL/min/[1.73_m2]    GFR Estimate If Black 80 >60 mL/min/[1.73_m2]    Calcium 8.9 8.5 - 10.1 mg/dL    Bilirubin Total 0.2 0.2 - 1.3 mg/dL    Albumin 3.4 3.4 - 5.0 g/dL    Protein Total 7.7 6.8 - 8.8 g/dL    Alkaline Phosphatase 90 40 - 150 U/L    ALT 26 0 - 50 U/L    AST 15 0 - 45 U/L   Drug Abuse Screen Panel 13, Urine (Pain Care Package)    Collection Time: 12/03/19 12:11 PM   Result Value Ref Range    Cannabinoids (78-rdp-5-carboxy-9-THC) Not Detected NDET^Not Detected ng/mL    Phencyclidine (Phencyclidine) Not Detected NDET^Not Detected ng/mL    Cocaine (Benzoylecgonine) Not Detected NDET^Not Detected ng/mL    Methamphetamine (d-Methamphetamine) Not Detected NDET^Not Detected ng/mL    Opiates (Morphine)  Not Detected NDET^Not Detected ng/mL    Amphetamine (d-Amphetamine) Not Detected NDET^Not Detected ng/mL    Benzodiazepines (Nordiazepam) Not Detected NDET^Not Detected ng/mL    Tricyclic Antidepressants (Desipramine) Not Detected NDET^Not Detected ng/mL    Methadone (Methadone) Not Detected NDET^Not Detected ng/mL    Barbiturates (Butalbital) Not Detected NDET^Not Detected ng/mL    Oxycodone (Oxycodone) Not Detected NDET^Not Detected ng/mL    Propoxyphene (Norpropoxyphene) Not Detected NDET^Not Detected ng/mL    Buprenorphine (Buprenorphine) Not Detected NDET^Not Detected ng/mL   Glucose by meter    Collection Time: 12/20/19  4:04 PM   Result Value Ref Range    Glucose 219 (H) 70 - 99 mg/dL   Drug abuse screen 6 urine (chem dep)    Collection Time: 12/20/19  4:19 PM   Result Value Ref Range    Amphetamine Qual Urine Negative NEG^Negative    Barbiturates Qual Urine Negative NEG^Negative    Benzodiazepine Qual Urine Negative NEG^Negative    Cannabinoids Qual Urine Negative NEG^Negative    Cocaine Qual Urine Negative NEG^Negative    Ethanol Qual Urine Negative NEG^Negative    Opiates Qualitative Urine Negative NEG^Negative   Glucose by meter    Collection Time: 12/20/19  8:56 PM   Result Value Ref Range    Glucose 110 (H) 70 - 99 mg/dL   Glucose by meter    Collection Time: 12/20/19 11:13 PM   Result Value Ref Range    Glucose 190 (H) 70 - 99 mg/dL   Glucose by meter    Collection Time: 12/21/19  2:05 AM   Result Value Ref Range    Glucose 177 (H) 70 - 99 mg/dL   Glucose by meter    Collection Time: 12/21/19  7:45 AM   Result Value Ref Range    Glucose 148 (H) 70 - 99 mg/dL   Glucose by meter    Collection Time: 12/21/19 11:09 AM   Result Value Ref Range    Glucose 134 (H) 70 - 99 mg/dL   Glucose by meter    Collection Time: 12/21/19 12:26 PM   Result Value Ref Range    Glucose 123 (H) 70 - 99 mg/dL   Glucose by meter    Collection Time: 12/21/19  5:13 PM   Result Value Ref Range    Glucose 130 (H) 70 - 99 mg/dL    Glucose by meter    Collection Time: 12/21/19  9:57 PM   Result Value Ref Range    Glucose 158 (H) 70 - 99 mg/dL   Glucose by meter    Collection Time: 12/22/19  1:47 AM   Result Value Ref Range    Glucose 110 (H) 70 - 99 mg/dL   CBC with platelets differential    Collection Time: 12/22/19  6:51 AM   Result Value Ref Range    WBC 7.5 4.0 - 11.0 10e9/L    RBC Count 3.57 (L) 3.8 - 5.2 10e12/L    Hemoglobin 10.8 (L) 11.7 - 15.7 g/dL    Hematocrit 34.9 (L) 35.0 - 47.0 %    MCV 98 78 - 100 fl    MCH 30.3 26.5 - 33.0 pg    MCHC 30.9 (L) 31.5 - 36.5 g/dL    RDW 13.6 10.0 - 15.0 %    Platelet Count 221 150 - 450 10e9/L    Diff Method Automated Method     % Neutrophils 49.9 %    % Lymphocytes 40.2 %    % Monocytes 8.0 %    % Eosinophils 1.2 %    % Basophils 0.3 %    % Immature Granulocytes 0.4 %    Nucleated RBCs 0 0 /100    Absolute Neutrophil 3.8 1.6 - 8.3 10e9/L    Absolute Lymphocytes 3.0 0.8 - 5.3 10e9/L    Absolute Monocytes 0.6 0.0 - 1.3 10e9/L    Absolute Eosinophils 0.1 0.0 - 0.7 10e9/L    Absolute Basophils 0.0 0.0 - 0.2 10e9/L    Abs Immature Granulocytes 0.0 0 - 0.4 10e9/L    Absolute Nucleated RBC 0.0    Comprehensive metabolic panel    Collection Time: 12/22/19  6:51 AM   Result Value Ref Range    Sodium 141 133 - 144 mmol/L    Potassium 4.1 3.4 - 5.3 mmol/L    Chloride 111 (H) 94 - 109 mmol/L    Carbon Dioxide 22 20 - 32 mmol/L    Anion Gap 8 3 - 14 mmol/L    Glucose 111 (H) 70 - 99 mg/dL    Urea Nitrogen 16 7 - 30 mg/dL    Creatinine 0.86 0.52 - 1.04 mg/dL    GFR Estimate 74 >60 mL/min/[1.73_m2]    GFR Estimate If Black 85 >60 mL/min/[1.73_m2]    Calcium 8.7 8.5 - 10.1 mg/dL    Bilirubin Total 0.2 0.2 - 1.3 mg/dL    Albumin 3.2 (L) 3.4 - 5.0 g/dL    Protein Total 7.5 6.8 - 8.8 g/dL    Alkaline Phosphatase 88 40 - 150 U/L    ALT 22 0 - 50 U/L    AST 8 0 - 45 U/L   Lipid panel    Collection Time: 12/22/19  6:51 AM   Result Value Ref Range    Cholesterol 154 <200 mg/dL    Triglycerides 132 <150 mg/dL    HDL  Cholesterol 42 (L) >49 mg/dL    LDL Cholesterol Calculated 86 <100 mg/dL    Non HDL Cholesterol 112 <130 mg/dL   TSH with free T4 reflex and/or T3 as indicated    Collection Time: 12/22/19  6:51 AM   Result Value Ref Range    TSH 1.23 0.40 - 4.00 mU/L   Folate    Collection Time: 12/22/19  6:51 AM   Result Value Ref Range    Folate 8.5 >5.4 ng/mL   Vitamin B12    Collection Time: 12/22/19  6:51 AM   Result Value Ref Range    Vitamin B12 358 193 - 986 pg/mL   Vitamin D    Collection Time: 12/22/19  6:51 AM   Result Value Ref Range    Vitamin D Deficiency screening 39 20 - 75 ug/L   Bilirubin direct    Collection Time: 12/22/19  6:51 AM   Result Value Ref Range    Bilirubin Direct <0.1 0.0 - 0.2 mg/dL   Glucose by meter    Collection Time: 12/22/19 12:17 PM   Result Value Ref Range    Glucose 238 (H) 70 - 99 mg/dL   UA with Microscopic reflex to Culture    Collection Time: 12/22/19  1:15 PM   Result Value Ref Range    Color Urine Yellow     Appearance Urine Clear     Glucose Urine 300 (A) NEG^Negative mg/dL    Bilirubin Urine Negative NEG^Negative    Ketones Urine Negative NEG^Negative mg/dL    Specific Gravity Urine 1.018 1.003 - 1.035    Blood Urine Negative NEG^Negative    pH Urine 6.0 5.0 - 7.0 pH    Protein Albumin Urine Negative NEG^Negative mg/dL    Urobilinogen mg/dL Normal 0.0 - 2.0 mg/dL    Nitrite Urine Negative NEG^Negative    Leukocyte Esterase Urine Negative NEG^Negative    Source Midstream Urine     WBC Urine <1 0 - 5 /HPF    RBC Urine 0 0 - 2 /HPF    Bacteria Urine Few (A) NEG^Negative /HPF    Squamous Epithelial /HPF Urine 1 0 - 1 /HPF   Glucose by meter    Collection Time: 12/22/19  4:32 PM   Result Value Ref Range    Glucose 224 (H) 70 - 99 mg/dL   Glucose by meter    Collection Time: 12/22/19  9:07 PM   Result Value Ref Range    Glucose 204 (H) 70 - 99 mg/dL   Glucose by meter    Collection Time: 12/23/19  2:01 AM   Result Value Ref Range    Glucose 197 (H) 70 - 99 mg/dL   Glucose by meter     Collection Time: 12/23/19  7:57 AM   Result Value Ref Range    Glucose 132 (H) 70 - 99 mg/dL   Glucose by meter    Collection Time: 12/23/19 11:40 AM   Result Value Ref Range    Glucose 194 (H) 70 - 99 mg/dL   Glucose by meter    Collection Time: 12/23/19  4:43 PM   Result Value Ref Range    Glucose 151 (H) 70 - 99 mg/dL   Glucose by meter    Collection Time: 12/23/19  8:04 PM   Result Value Ref Range    Glucose 301 (H) 70 - 99 mg/dL   Glucose by meter    Collection Time: 12/23/19  9:12 PM   Result Value Ref Range    Glucose 286 (H) 70 - 99 mg/dL   Glucose by meter    Collection Time: 12/24/19  1:48 AM   Result Value Ref Range    Glucose 164 (H) 70 - 99 mg/dL   Glucose by meter    Collection Time: 12/24/19  8:39 AM   Result Value Ref Range    Glucose 101 (H) 70 - 99 mg/dL   Glucose by meter    Collection Time: 12/24/19 12:00 PM   Result Value Ref Range    Glucose 155 (H) 70 - 99 mg/dL   Glucose by meter    Collection Time: 12/24/19  5:11 PM   Result Value Ref Range    Glucose 144 (H) 70 - 99 mg/dL   Glucose by meter    Collection Time: 12/24/19  8:48 PM   Result Value Ref Range    Glucose 239 (H) 70 - 99 mg/dL   Glucose by meter    Collection Time: 12/25/19  2:05 AM   Result Value Ref Range    Glucose 147 (H) 70 - 99 mg/dL   Glucose by meter    Collection Time: 12/25/19  7:50 AM   Result Value Ref Range    Glucose 111 (H) 70 - 99 mg/dL   Glucose by meter    Collection Time: 12/25/19 12:14 PM   Result Value Ref Range    Glucose 199 (H) 70 - 99 mg/dL   Glucose by meter    Collection Time: 12/25/19  4:46 PM   Result Value Ref Range    Glucose 352 (H) 70 - 99 mg/dL   Glucose by meter    Collection Time: 12/25/19  9:04 PM   Result Value Ref Range    Glucose 338 (H) 70 - 99 mg/dL   Glucose by meter    Collection Time: 12/25/19  9:47 PM   Result Value Ref Range    Glucose 355 (H) 70 - 99 mg/dL   Glucose by meter    Collection Time: 12/26/19  1:49 AM   Result Value Ref Range    Glucose 194 (H) 70 - 99 mg/dL             "Psychiatric Examination:   Temp: 97.4  F (36.3  C) Temp src: Oral BP: 139/86 Pulse: 91   Resp: 16 SpO2: 98 % O2 Device: None (Room air)    Weight is 236 lbs 0 oz  Body mass index is 46.09 kg/m .    Appearance: well groomed, awake, alert, cooperative, no apparent distress and moderately obese  Attitude:  cooperative  Eye Contact:  good  Mood:  good  Affect:  appropriate and in normal range  Speech:  clear, coherent  Psychomotor Behavior:  no evidence of tardive dyskinesia, dystonia, or tics  Throught Process:  logical and goal oriented  Associations:  no loose associations  Thought Content:  no evidence of suicidal ideation or homicidal ideation, no auditory hallucinations present and no visual hallucinations present  Insight:  good  Judgement:  intact  Oriented to:  time, person, and place  Attention Span and Concentration:  intact  Recent and Remote Memory:  intact         Precautions:     Behavioral Orders   Procedures     Code 1 - Restrict to Unit     Fall precautions     Routine Programming     As clinically indicated     Self Injury Precaution     Status 15     Every 15 minutes.     Suicide precautions     Patients on Suicide Precautions should have a Combination Diet ordered that includes a Diet selection(s) AND a Behavioral Tray selection for Safe Tray - with utensils, or Safe Tray - NO utensils            DIagnoses:   1.  Schizoaffective disorder, bipolar type, currently depressed, with psychosis  2.  Borderline intellectual functioning  3.  Polysubstance abuse  4.  Medical problems include morbid obesity, type 2 diabetes, sleep apnea, multiple pain issues.         Plan:   The patient is a very pleasant, -American female who was admitted with increased depression, auditory visual hallucinations, and suicidal thoughts with a plan to overdose on her insulin.  Reported having a \"suicide trials\" by injecting herself with more insulin than she needs.  Reports that her medications are administered by the " staff however, she is managing her insulin.  The patient was not able to identify ago.  Reports that the auditory visual hallucinations have been going on for the last 3 years.  She has been having intrusive thoughts of suicide.  She feels safe on the unit.  The patient was not able to discuss her medications since she does not know what she is taking.  Medication changes will include the following:   --Discontinue Cogentin.    --Start amantadine 100 mg twice a day.   --Decrease gabapentin to 300 mg 3 times a day to reduce sedation.    --Change Invega to 9 mg at bedtime.    --Discontinue the morning dose of Topamax.    --Continue Topamax 50 mg at bedtime.   --Continue Klonopin 0.5 mg at bedtime.    --Continue melatonin 10 mg at bedtime.    --Discontinue naltrexone, the patient has been sober for 27 years.    --Continue Zoloft 200 mg every morning.    --Blood work was reviewed.    --Internal medicine follow-up for medical problems.    --The patient was consulted on nature of illness and treatment options.   --Care was coordinated with the treatment team.     Disposition Plan   Reason for ongoing admission: is unable to care for self due to depression, SI, AH/VH.   Disposition:group home  Estimated length of stay: 5-7 days  Legal Status:  voluntary  Discharge will be granted once symptoms improved.    Dede CORDOVA CNP  Date: 12/26/19  Time: 12:50 PM

## 2019-12-26 NOTE — PROGRESS NOTES
Pt stated she did not want to conduct 1:1 check-in due to feeling tired and her stomach hurting. Pt present in milieu though tends to be withdrawn with peers. Pt briefly joined group in the morning. Pt had a visitor in afternoon, Pt appeared happy while chatting with visitor. No stated depression, anxiety, SI/SIB, or hallucinations.       12/26/19 1503   Behavioral Health   Hallucinations denies / not responding to hallucinations   Thinking intact;distractable   Orientation person: oriented;place: oriented;date: oriented;time: oriented   Memory baseline memory   Insight insight appropriate to situation;insight appropriate to events   Judgement impaired   Eye Contact at examiner   Affect blunted, flat   Mood mood is calm   Physical Appearance/Attire attire appropriate to age and situation   Hygiene neglected grooming - unclean body, hair, teeth   Suicidality other (see comments)  ([place], None stated)   1. Wish to be Dead (Recent)   (BYRON)   2. Non-Specific Active Suicidal Thoughts (Recent)   ([place]RON)   Self Injury other (see comment)  (None stated)   Elopement   (None)   Activity withdrawn   Speech coherent;clear   Medication Sensitivity no stated side effects;no observed side effects   Psychomotor / Gait steady;balanced  (Uses walker)   Psycho Education   Type of Intervention 1:1 intervention   Response participates, initiates socially appropriate   Hours 0.5   Treatment Detail Check-in   Safety   Suicidality Status 15   Activities of Daily Living   Hygiene/Grooming independent   Oral Hygiene independent   Dress independent   Room Organization independent   Activity   Activity Assistance Provided independent

## 2019-12-27 LAB
GLUCOSE BLDC GLUCOMTR-MCNC: 165 MG/DL (ref 70–99)
GLUCOSE BLDC GLUCOMTR-MCNC: 229 MG/DL (ref 70–99)
GLUCOSE BLDC GLUCOMTR-MCNC: 232 MG/DL (ref 70–99)
GLUCOSE BLDC GLUCOMTR-MCNC: 270 MG/DL (ref 70–99)
GLUCOSE BLDC GLUCOMTR-MCNC: 427 MG/DL (ref 70–99)
GLUCOSE BLDC GLUCOMTR-MCNC: 428 MG/DL (ref 70–99)

## 2019-12-27 PROCEDURE — 00000146 ZZHCL STATISTIC GLUCOSE BY METER IP

## 2019-12-27 PROCEDURE — 25000132 ZZH RX MED GY IP 250 OP 250 PS 637: Mod: GY | Performed by: PSYCHIATRY & NEUROLOGY

## 2019-12-27 PROCEDURE — 25000132 ZZH RX MED GY IP 250 OP 250 PS 637: Mod: GY | Performed by: NURSE PRACTITIONER

## 2019-12-27 PROCEDURE — G0177 OPPS/PHP; TRAIN & EDUC SERV: HCPCS

## 2019-12-27 PROCEDURE — 12400002 ZZH R&B MH SENIOR/ADOLESCENT

## 2019-12-27 PROCEDURE — 99232 SBSQ HOSP IP/OBS MODERATE 35: CPT | Performed by: NURSE PRACTITIONER

## 2019-12-27 RX ORDER — GABAPENTIN 300 MG/1
300 CAPSULE ORAL AT BEDTIME
Status: DISCONTINUED | OUTPATIENT
Start: 2019-12-27 | End: 2020-01-02 | Stop reason: HOSPADM

## 2019-12-27 RX ADMIN — FAMOTIDINE 40 MG: 20 TABLET ORAL at 21:13

## 2019-12-27 RX ADMIN — AMANTADINE HYDROCHLORIDE 100 MG: 100 CAPSULE ORAL at 08:27

## 2019-12-27 RX ADMIN — SERTRALINE HYDROCHLORIDE 200 MG: 100 TABLET ORAL at 08:27

## 2019-12-27 RX ADMIN — NYSTATIN: 100000 CREAM TOPICAL at 21:13

## 2019-12-27 RX ADMIN — TOPIRAMATE 50 MG: 50 TABLET ORAL at 21:13

## 2019-12-27 RX ADMIN — PALIPERIDONE 9 MG: 9 TABLET, EXTENDED RELEASE ORAL at 21:13

## 2019-12-27 RX ADMIN — SENNOSIDES AND DOCUSATE SODIUM 1 TABLET: 8.6; 5 TABLET ORAL at 09:21

## 2019-12-27 RX ADMIN — GABAPENTIN 300 MG: 300 CAPSULE ORAL at 21:13

## 2019-12-27 RX ADMIN — GABAPENTIN 300 MG: 300 CAPSULE ORAL at 08:27

## 2019-12-27 RX ADMIN — ASPIRIN 81 MG: 81 TABLET ORAL at 08:27

## 2019-12-27 RX ADMIN — CLONAZEPAM 0.5 MG: 0.5 TABLET ORAL at 21:13

## 2019-12-27 RX ADMIN — ATORVASTATIN CALCIUM 80 MG: 80 TABLET, FILM COATED ORAL at 08:26

## 2019-12-27 RX ADMIN — AMANTADINE HYDROCHLORIDE 100 MG: 100 CAPSULE ORAL at 21:13

## 2019-12-27 RX ADMIN — MAGNESIUM HYDROXIDE 30 ML: 400 SUSPENSION ORAL at 21:13

## 2019-12-27 RX ADMIN — Medication 10 MG: at 21:13

## 2019-12-27 RX ADMIN — SENNOSIDES AND DOCUSATE SODIUM 1 TABLET: 8.6; 5 TABLET ORAL at 21:13

## 2019-12-27 ASSESSMENT — ACTIVITIES OF DAILY LIVING (ADL)
LAUNDRY: UNABLE TO COMPLETE
HYGIENE/GROOMING: INDEPENDENT
ORAL_HYGIENE: INDEPENDENT
DRESS: INDEPENDENT
HYGIENE/GROOMING: INDEPENDENT
DRESS: STREET CLOTHES;SCRUBS (BEHAVIORAL HEALTH)
ORAL_HYGIENE: INDEPENDENT

## 2019-12-27 NOTE — PROGRESS NOTES
12/27/19 1200   General Information   Date Initially Attended OT 12/27/19   Clinical Impression   Affect Flat   Orientation Needs further assessment   Appearance and ADLs General cleanliness observed in most areas   Attention to Internal Stimuli No observed signs   Interaction Skills Interacts appropriately with staff;Interacts appropriately with peers  (on approach)   Ability to Communicate Needs Does so with prompts   Verbal Content Clear;Appropriate to topic;[place]   Ability to Maintain Boundaries Maintains appropriate physical boundaries;Maintains appropriate verbal boundaries   Participation Participates with minimal encouragement   Concentration Concentrates 30+ minutes   Ability to Concentrate With structure   Follows and Comprehends Directions Independently follows 2 step verbal directions   Memory Needs further assessment   Organization Independently organizes simple tasks   Decision Making Independent   Planning and Problem Solving Occasionally needs assist/feedback   Ability to Apply and Learn Concepts Applies within group structure   Frustrations / Stress Tolerance Needs further assessment   Level of Insight Some insight;Needs further assessment   Self Esteem Can identify positives;Needs further assessment   Social Supports Needs further assessment   General Observation/Plan   General Observations/Plan See Comments   Attended the am OT group. She was pleasant on approach. Affect appeared flat. She chose and worked on a task on familiar steps. She made decisions on details independently. She worked at a constant and more relaxed pace.  OT purpose was explained with the value of having involvement in treatment plan, and provided options to meet self identified goals. Plan:  Encourage attendance. Offer opportunity for success oriented, more structured task work, grade complexity as tolerated, provide the opportunity to relax and decrease anxiety with attendance and reassurance with task focus. Provide  structured assistance as difficulties are noted according to cognitive needs with the plan to decrease frustrations as well as assess abilities. Will assess further in the areas of organization, problem solving, concentration.

## 2019-12-27 NOTE — PROGRESS NOTES
Foster care provider visited pt this evening and brought in paperwork for the pt to sign for her to be discharged from the group home. Patient in the milleu most of the shift. Med compliant. BG this shift; 206 at 4 PM, 337 @ HS. Pt med compliant. Pt concerned about a scheduled diabetes medications she was supposed to be on. Couldn't remember the name. Advised pt to talk to provider. No other concerns noted. SI thoughts but contracted to safety while on unit. No other concerns. Will continue to monitor.

## 2019-12-27 NOTE — PROGRESS NOTES
Continues with poorly controlled BG as below despite lantus 50 units    Discussed with patient and she is not having symptoms with hyperglycemia including no dizziness, HA, shakiness, weakness, polyuria/dipsia    Recent Labs   Lab 12/27/19  0130 12/26/19 2011 12/26/19  1702 12/26/19  1211 12/26/19  0801 12/26/19  0149  12/22/19  0651   GLC  --   --   --   --   --   --   --  111*   * 337* 206* 223* 139* 194*   < >  --     < > = values in this interval not displayed.       -increasd lantus to 60 units  -increase sliding scale to high   -continue hypoglycemia protocol  -please notify medicine if planned for discharge  - will d/w psychiatry
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
## 2019-12-27 NOTE — PROGRESS NOTES
Completed Pre Admission Screening for admission to [facility].  Confirmation # [id number]    Met with patient to discuss discharge plan. Patient reported she prefers to be placed in [facility] to be near her kids. Writer placed additional referrals in [facility] and [place]: [facility] in [place] and [place], [facility] in [place] and [facility] in [place].    Phone call with Destini Howard, wing CADI  who reported she cannot work on placement while patient is in the hospital because it is not billable and the case will be closed. She suggested contacting patient's mental health , Amber Price at [phone]. Writer LVM with [facility] Issa.    Voice message from Nena of [facility] ([phone]) reporting they denied patient for admission.    [facility] called to report they cannot take the patient.    Referral faxed to [facility] in [place] at [phone].    H&P and progress notes were faxed to Slime Gray with [facility] per her request. Fax:  [phone].

## 2019-12-28 LAB
GLUCOSE BLDC GLUCOMTR-MCNC: 119 MG/DL (ref 70–99)
GLUCOSE BLDC GLUCOMTR-MCNC: 119 MG/DL (ref 70–99)
GLUCOSE BLDC GLUCOMTR-MCNC: 179 MG/DL (ref 70–99)
GLUCOSE BLDC GLUCOMTR-MCNC: 230 MG/DL (ref 70–99)
GLUCOSE BLDC GLUCOMTR-MCNC: 349 MG/DL (ref 70–99)

## 2019-12-28 PROCEDURE — 00000146 ZZHCL STATISTIC GLUCOSE BY METER IP

## 2019-12-28 PROCEDURE — 25000132 ZZH RX MED GY IP 250 OP 250 PS 637: Mod: GY | Performed by: PSYCHIATRY & NEUROLOGY

## 2019-12-28 PROCEDURE — 12400002 ZZH R&B MH SENIOR/ADOLESCENT

## 2019-12-28 PROCEDURE — 25000132 ZZH RX MED GY IP 250 OP 250 PS 637: Mod: GY | Performed by: NURSE PRACTITIONER

## 2019-12-28 PROCEDURE — 25000132 ZZH RX MED GY IP 250 OP 250 PS 637: Mod: GY | Performed by: PHYSICIAN ASSISTANT

## 2019-12-28 RX ADMIN — METOPROLOL SUCCINATE 25 MG: 25 TABLET, EXTENDED RELEASE ORAL at 08:28

## 2019-12-28 RX ADMIN — TOPIRAMATE 50 MG: 50 TABLET ORAL at 20:59

## 2019-12-28 RX ADMIN — CLONAZEPAM 0.5 MG: 0.5 TABLET ORAL at 20:59

## 2019-12-28 RX ADMIN — PALIPERIDONE 9 MG: 9 TABLET, EXTENDED RELEASE ORAL at 20:59

## 2019-12-28 RX ADMIN — LOPERAMIDE HYDROCHLORIDE 2 MG: 2 CAPSULE ORAL at 19:46

## 2019-12-28 RX ADMIN — AMANTADINE HYDROCHLORIDE 100 MG: 100 CAPSULE ORAL at 08:28

## 2019-12-28 RX ADMIN — SERTRALINE HYDROCHLORIDE 200 MG: 100 TABLET ORAL at 08:28

## 2019-12-28 RX ADMIN — NYSTATIN: 100000 CREAM TOPICAL at 20:59

## 2019-12-28 RX ADMIN — INSULIN GLARGINE 60 UNITS: 100 INJECTION, SOLUTION SUBCUTANEOUS at 17:20

## 2019-12-28 RX ADMIN — AMANTADINE HYDROCHLORIDE 100 MG: 100 CAPSULE ORAL at 20:59

## 2019-12-28 RX ADMIN — ASPIRIN 81 MG: 81 TABLET ORAL at 08:28

## 2019-12-28 RX ADMIN — LOSARTAN POTASSIUM 50 MG: 50 TABLET, FILM COATED ORAL at 08:28

## 2019-12-28 RX ADMIN — FAMOTIDINE 40 MG: 20 TABLET ORAL at 20:59

## 2019-12-28 RX ADMIN — ACETAMINOPHEN 650 MG: 325 TABLET, FILM COATED ORAL at 15:59

## 2019-12-28 RX ADMIN — ATORVASTATIN CALCIUM 80 MG: 80 TABLET, FILM COATED ORAL at 08:28

## 2019-12-28 RX ADMIN — Medication 10 MG: at 20:59

## 2019-12-28 RX ADMIN — GABAPENTIN 300 MG: 300 CAPSULE ORAL at 20:59

## 2019-12-28 ASSESSMENT — MIFFLIN-ST. JEOR: SCORE: 1570.17

## 2019-12-28 NOTE — PROGRESS NOTES
Pt bedtime BGL was 427, rechecked to verify and was 428. Correction dose given (7 units) and internal medicine was contacted per protocol - per MD no other intervention needed and recheck pt at scheduled 0200 time.

## 2019-12-28 NOTE — PROGRESS NOTES
Continues with poorly controlled dinner-time BG as below despite lantus 60 units    Discussed with patient and she is not having symptoms with hyperglycemia including no dizziness, HA, shakiness, weakness, polyuria/dipsia    Recent Labs   Lab 12/28/19  0806 12/28/19  0148 12/27/19  2106 12/27/19  2053 12/27/19  1717 12/27/19  1207  12/22/19  0651   GLC  --   --   --   --   --   --   --  111*   * 179* 428* 427* 232* 270*   < >  --     < > = values in this interval not displayed.       -change timing of lantus to 60 units at 5pm  -endocrine consult if continues w/ poor control  -yesterday increased sliding scale to high   -continue hypoglycemia protocol  -please notify medicine if planned for discharge

## 2019-12-28 NOTE — PLAN OF CARE
Pt presents with blunted, flat affect and calm mood. Denies SI/SIB and hallucinations. She spent most of the evening in the lounge watching television. Ate 75% of her dinner. Pt had cake for a snack this evening as well. BGL were 232 and 427. Med compliant and VSS. No other concerns or complaints at this time.

## 2019-12-28 NOTE — PROGRESS NOTES
"   12/28/19 9530   Behavioral Health   Hallucinations denies / not responding to hallucinations   Thinking poor concentration   Orientation person: oriented;place: oriented;date: oriented;time: oriented   Memory baseline memory   Insight poor   Judgement impaired   Eye Contact at examiner   Affect blunted, flat;full range affect   Mood mood is calm;anxious;depressed   Physical Appearance/Attire attire appropriate to age and situation   Hygiene neglected grooming - unclean body, hair, teeth   1. Wish to be Dead (Recent) No   2. Non-Specific Active Suicidal Thoughts (Recent) No       Pt was active within the milieu but not very social with peers. Pt did not have a good appetite today. Pt did not eat any of her breakfast and only picked at her lunch for today. When asked to rate her depression, pt stated that she is feeling only \"a little\" depressed today. Pt rated herself at a 10 for anxiety, stating that she has been feeling very anxious today to do something even though she has nothing to do for the day. Pt is currently relaxing in the lounge watching a movie with peers.  "

## 2019-12-29 LAB
GLUCOSE BLDC GLUCOMTR-MCNC: 154 MG/DL (ref 70–99)
GLUCOSE BLDC GLUCOMTR-MCNC: 165 MG/DL (ref 70–99)
GLUCOSE BLDC GLUCOMTR-MCNC: 232 MG/DL (ref 70–99)
GLUCOSE BLDC GLUCOMTR-MCNC: 244 MG/DL (ref 70–99)
GLUCOSE BLDC GLUCOMTR-MCNC: 316 MG/DL (ref 70–99)
GLUCOSE BLDC GLUCOMTR-MCNC: 96 MG/DL (ref 70–99)

## 2019-12-29 PROCEDURE — 00000146 ZZHCL STATISTIC GLUCOSE BY METER IP

## 2019-12-29 PROCEDURE — 25000132 ZZH RX MED GY IP 250 OP 250 PS 637: Mod: GY | Performed by: PSYCHIATRY & NEUROLOGY

## 2019-12-29 PROCEDURE — 12400002 ZZH R&B MH SENIOR/ADOLESCENT

## 2019-12-29 PROCEDURE — 25000132 ZZH RX MED GY IP 250 OP 250 PS 637: Mod: GY | Performed by: NURSE PRACTITIONER

## 2019-12-29 PROCEDURE — 25000132 ZZH RX MED GY IP 250 OP 250 PS 637: Mod: GY | Performed by: PHYSICIAN ASSISTANT

## 2019-12-29 PROCEDURE — H2032 ACTIVITY THERAPY, PER 15 MIN: HCPCS

## 2019-12-29 PROCEDURE — 25000131 ZZH RX MED GY IP 250 OP 636 PS 637: Mod: GY | Performed by: PHYSICIAN ASSISTANT

## 2019-12-29 RX ADMIN — FAMOTIDINE 40 MG: 20 TABLET ORAL at 20:57

## 2019-12-29 RX ADMIN — AMANTADINE HYDROCHLORIDE 100 MG: 100 CAPSULE ORAL at 08:54

## 2019-12-29 RX ADMIN — ACETAMINOPHEN 650 MG: 325 TABLET, FILM COATED ORAL at 09:05

## 2019-12-29 RX ADMIN — ATORVASTATIN CALCIUM 80 MG: 80 TABLET, FILM COATED ORAL at 08:54

## 2019-12-29 RX ADMIN — CLONAZEPAM 0.5 MG: 0.5 TABLET ORAL at 20:57

## 2019-12-29 RX ADMIN — ACETAMINOPHEN 650 MG: 325 TABLET, FILM COATED ORAL at 22:19

## 2019-12-29 RX ADMIN — LOSARTAN POTASSIUM 50 MG: 50 TABLET, FILM COATED ORAL at 08:54

## 2019-12-29 RX ADMIN — ACETAMINOPHEN 650 MG: 325 TABLET, FILM COATED ORAL at 15:19

## 2019-12-29 RX ADMIN — NYSTATIN: 100000 CREAM TOPICAL at 08:56

## 2019-12-29 RX ADMIN — INSULIN GLARGINE 60 UNITS: 100 INJECTION, SOLUTION SUBCUTANEOUS at 17:37

## 2019-12-29 RX ADMIN — ASPIRIN 81 MG: 81 TABLET ORAL at 08:54

## 2019-12-29 RX ADMIN — ALUMINUM HYDROXIDE, MAGNESIUM HYDROXIDE, AND DIMETHICONE 30 ML: 400; 400; 40 SUSPENSION ORAL at 11:38

## 2019-12-29 RX ADMIN — GABAPENTIN 300 MG: 300 CAPSULE ORAL at 20:56

## 2019-12-29 RX ADMIN — SERTRALINE HYDROCHLORIDE 200 MG: 100 TABLET ORAL at 08:54

## 2019-12-29 RX ADMIN — METOPROLOL SUCCINATE 25 MG: 25 TABLET, EXTENDED RELEASE ORAL at 08:54

## 2019-12-29 RX ADMIN — AMANTADINE HYDROCHLORIDE 100 MG: 100 CAPSULE ORAL at 20:58

## 2019-12-29 RX ADMIN — Medication 10 MG: at 20:57

## 2019-12-29 RX ADMIN — TOPIRAMATE 50 MG: 50 TABLET ORAL at 20:57

## 2019-12-29 RX ADMIN — PALIPERIDONE 9 MG: 9 TABLET, EXTENDED RELEASE ORAL at 20:58

## 2019-12-29 ASSESSMENT — ACTIVITIES OF DAILY LIVING (ADL)
LAUNDRY: WITH SUPERVISION
ORAL_HYGIENE: INDEPENDENT
DRESS: INDEPENDENT
DRESS: INDEPENDENT
ORAL_HYGIENE: INDEPENDENT
HYGIENE/GROOMING: INDEPENDENT
LAUNDRY: WITH SUPERVISION
HYGIENE/GROOMING: INDEPENDENT

## 2019-12-29 ASSESSMENT — MIFFLIN-ST. JEOR: SCORE: 1562.92

## 2019-12-29 NOTE — PLAN OF CARE
"  Pt visible in milieu watching TV. Affect flat. Reports \"having a good day.\" Makes good eye contact. Denies depression, anxiety, SI. Med-compliant. Cooperative.  prior to dinner.  at HS. Novolog correction doses given. Lantus given with dinner per IM recommendations. Intake 870 ml. Carbs 48 g at dinner. Continue with current treatment plan and recommendations. Continue to monitor and reassess symptoms. Monitor response to medications. Monitor progress towards treatment goals. Encourage groups and participation.   "

## 2019-12-29 NOTE — PROGRESS NOTES
Continues with poorly controlled dinner-time BG as below despite lantus 60 units, continues on high sliding scale    Patient remains asymptomatic, UOP not measured. Wt somewhat decreased since admission 108.5kg--> 106.1kg    Needing 4 units Novolog x past 2 days per sliding scale at dinner time, on 12/28 lunch dose of 6 units given    Recent Labs   Lab 12/29/19  0917 12/29/19  0837 12/29/19  0152 12/28/19  2105 12/28/19  1651 12/28/19  1153   * 96 154* 349* 230* 119*       -on 12/28 timing of lantus changed to 60 units at 5pm  -endocrine consult if continues w/ poor control  -12/27 increased sliding scale to high   -continue hypoglycemia protocol  -diabetes education consult pending  -please notify medicine if planned for discharge

## 2019-12-29 NOTE — PLAN OF CARE
"  Problem: Suicidal Behavior  Goal: Suicidal Behavior is Absent or Managed  12/29/2019 0926 by Xavi Ventura, RN  Outcome: No Change     Problem: Adult Behavioral Health Plan of Care  Goal: Adheres to Safety Considerations for Self and Others  Outcome: Improving     NURSING ASSESSMENT    MENTAL HEALTH  Pt denies SI/SIB/hallucinations.  Pt endorses anxiety and depressive symptoms. Rated anxiety 5-6 out of 10 on a 10 pt scale, with 0 = none and 10 = high. Stated anxiety is tolerable at this time. Reported depressive symptoms are low and believes her zoloft is improving her mood. Pt visible in the milieu, watching tv with peers. Ambulates independently with walker.    Pt is calm and pleasant.     Appetite = 50% lack of appetite for breakfast; 95% lunch. See I/O for intake and CHO    Sleep = 7.5 hr, reported sleep was not good and melatonin did not seem to help. Pt reports trazodone \"does not work with my body.\" Encouraged pt to speak with provider on Monday about other med choices for sleep aid.    MEDICAL CONCERNS  Diabetes managed with insulin + diabetes protocol  BGL 96   - rechecked after breakfast      PRNS this shift  Tylenol 650mg for bilateral upper shoulder pain (this is chronic pain as pt used to lift her  daily per pt report) - pt reported medication helped  mylanta 30ml suspension for indigestion - pt reported relief    VITAL SIGNS     12/29/19 0907   Vital Signs   Temp 97.7  F (36.5  C)   Temp src Tympanic   Resp 16   Pulse 82   Pulse/Heart Rate Source Monitor   /74   Sitting Orthostatic BP   Sitting Orthostatic /74   Sitting Orthostatic Pulse 82 bpm   Standing Orthostatic BP   Standing Orthostatic BP 96/65   Standing Orthostatic Pulse 86 bpm   Oxygen Therapy   SpO2 97 %   O2 Device None (Room air)   Pain/Comfort   Patient Currently in Pain yes   Preferred Pain Scale CAPA (Group)   Pain Body Location - Side Bilateral   Pain Body Location - Orientation upper   Pain Body " Location shoulder   Pain Management Interventions medication (see MAR) + heated blanket     PLAN  Continue with plan of care. Continue with BGLs checks.

## 2019-12-29 NOTE — PROGRESS NOTES
BS of 154 at 0200.  Patient sleeping comfortably.  Denies sx/sx of hyperglycemia.  Will continue to monitor.

## 2019-12-30 LAB
GLUCOSE BLDC GLUCOMTR-MCNC: 194 MG/DL (ref 70–99)
GLUCOSE BLDC GLUCOMTR-MCNC: 203 MG/DL (ref 70–99)
GLUCOSE BLDC GLUCOMTR-MCNC: 205 MG/DL (ref 70–99)
GLUCOSE BLDC GLUCOMTR-MCNC: 216 MG/DL (ref 70–99)
GLUCOSE BLDC GLUCOMTR-MCNC: 239 MG/DL (ref 70–99)
GLUCOSE BLDC GLUCOMTR-MCNC: 98 MG/DL (ref 70–99)

## 2019-12-30 PROCEDURE — 12400002 ZZH R&B MH SENIOR/ADOLESCENT

## 2019-12-30 PROCEDURE — 25000132 ZZH RX MED GY IP 250 OP 250 PS 637: Mod: GY | Performed by: PSYCHIATRY & NEUROLOGY

## 2019-12-30 PROCEDURE — 25000132 ZZH RX MED GY IP 250 OP 250 PS 637: Mod: GY | Performed by: PHYSICIAN ASSISTANT

## 2019-12-30 PROCEDURE — 25000132 ZZH RX MED GY IP 250 OP 250 PS 637: Mod: GY | Performed by: NURSE PRACTITIONER

## 2019-12-30 PROCEDURE — 99232 SBSQ HOSP IP/OBS MODERATE 35: CPT | Performed by: NURSE PRACTITIONER

## 2019-12-30 PROCEDURE — H2032 ACTIVITY THERAPY, PER 15 MIN: HCPCS

## 2019-12-30 PROCEDURE — 00000146 ZZHCL STATISTIC GLUCOSE BY METER IP

## 2019-12-30 RX ADMIN — ACETAMINOPHEN 650 MG: 325 TABLET, FILM COATED ORAL at 08:57

## 2019-12-30 RX ADMIN — ATORVASTATIN CALCIUM 80 MG: 80 TABLET, FILM COATED ORAL at 08:43

## 2019-12-30 RX ADMIN — PALIPERIDONE 9 MG: 9 TABLET, EXTENDED RELEASE ORAL at 21:08

## 2019-12-30 RX ADMIN — SERTRALINE HYDROCHLORIDE 200 MG: 100 TABLET ORAL at 08:43

## 2019-12-30 RX ADMIN — ASPIRIN 81 MG: 81 TABLET ORAL at 08:43

## 2019-12-30 RX ADMIN — CLONAZEPAM 0.5 MG: 0.5 TABLET ORAL at 21:08

## 2019-12-30 RX ADMIN — AMANTADINE HYDROCHLORIDE 100 MG: 100 CAPSULE ORAL at 21:08

## 2019-12-30 RX ADMIN — GABAPENTIN 300 MG: 300 CAPSULE ORAL at 21:08

## 2019-12-30 RX ADMIN — SENNOSIDES AND DOCUSATE SODIUM 1 TABLET: 8.6; 5 TABLET ORAL at 20:20

## 2019-12-30 RX ADMIN — FAMOTIDINE 40 MG: 20 TABLET ORAL at 21:08

## 2019-12-30 RX ADMIN — AMANTADINE HYDROCHLORIDE 100 MG: 100 CAPSULE ORAL at 08:43

## 2019-12-30 RX ADMIN — TOPIRAMATE 50 MG: 50 TABLET ORAL at 21:08

## 2019-12-30 RX ADMIN — METOPROLOL SUCCINATE 25 MG: 25 TABLET, EXTENDED RELEASE ORAL at 08:43

## 2019-12-30 RX ADMIN — Medication 10 MG: at 21:08

## 2019-12-30 RX ADMIN — NYSTATIN: 100000 CREAM TOPICAL at 08:44

## 2019-12-30 RX ADMIN — LOSARTAN POTASSIUM 50 MG: 50 TABLET, FILM COATED ORAL at 08:43

## 2019-12-30 ASSESSMENT — ACTIVITIES OF DAILY LIVING (ADL)
HYGIENE/GROOMING: INDEPENDENT
DRESS: INDEPENDENT
ORAL_HYGIENE: INDEPENDENT
ORAL_HYGIENE: INDEPENDENT
LAUNDRY: UNABLE TO COMPLETE
HYGIENE/GROOMING: INDEPENDENT
DRESS: INDEPENDENT
LAUNDRY: WITH SUPERVISION

## 2019-12-30 ASSESSMENT — MIFFLIN-ST. JEOR: SCORE: 1567.45

## 2019-12-30 NOTE — PROGRESS NOTES
12/29/19 2100   General Information   Art Directive other (see comments)   AT directive is to create a personal road sign, describing a positive direction that pt would like to go in the new year ahead. Goals of directive: emotional expression, identifying personal strengths and goals.  Pt was an active participant for the full duration of group. Pt a stop sign and an image of a street with cars and children. Pt did not relate the sign so much to herself, instead focused on the actual function of the sign. Pt did not seem to understand directive, but enjoyed drawing and had a good sense of humor.  Pts mood was calm.

## 2019-12-30 NOTE — PROGRESS NOTES
0200 BS of 208.  Patient denies s/sx of hyperglycemia.  Slept the entire shift.  Will continue to monitor.

## 2019-12-30 NOTE — PLAN OF CARE
Pt is out in the milieu most of the shift. Compliant to medications. On Carb count , On I&O intake only ( Pls see  I&O flow sheet). Pt is minimally social to select peers.Watching TV after lunch.  Patient denies SI,SIB,HI nor wish to be dead.  Pt still endorses anxiety and depression rating both at 5/10 .Mood is calm,affect is full range. Observed pt in tears while in the phone, pt told this writer later that that was her son.Pt is looking forward for grandson Nathanael visit this afternoon. Observed pt. Smiling more during check-in. Pt tolerating diet. Novolog insulin given as ordered. Attends and participates groups this morning.

## 2019-12-30 NOTE — PLAN OF CARE
Problem: Adult Behavioral Health Plan of Care  Goal: Team Discussion  Description  Team Plan:  Note:   BEHAVIORAL TEAM DISCUSSION    Participants: Dede CORDOVA DNP, Janett Herrera CTC, Zenia Kelly RN  Progress: Improving  Anticipated length of stay: 3 days  Continued Stay Criteria/Rationale: Continued stabilization  Medical/Physical: DM II  Precautions:   Behavioral Orders   Procedures    Code 1 - Restrict to Unit    Fall precautions    Routine Programming     As clinically indicated    Self Injury Precaution    Status 15     Every 15 minutes.    Suicide precautions     Patients on Suicide Precautions should have a Combination Diet ordered that includes a Diet selection(s) AND a Behavioral Tray selection for Safe Tray - with utensils, or Safe Tray - NO utensils       Plan: Patient was referred to skilled nursing facility. Waiting for level 2 screening to be completed.  Rationale for change in precautions or plan: No change

## 2019-12-30 NOTE — PROGRESS NOTES
"[facility], [place]   Psychiatric Progress Note        Interim History:   From H&P: Nena Tang is a 59 year old female with history of schizoaffective disorder, polysubstance abuse, type 2 diabetes, sleep apnea, and borderline intellectual functioning.  The patient went to the emergency room for evaluation of increased depression, and suicidal ideation.  The patient lives in a group home.  Reported having increasing thoughts of cutting or overdosing on insulin as a suicide attempt.  The patient reported that for several days, she has been taking extra insulin \"as a practice attempt\".  She reported having auditory hallucinations of seeing a man in in [place] and red eyes telling her to kill herself if she uses her CPAP machines.  She stopped using the CPAP.  She reported that the holidays are difficult for her as she lost multiple family members around this time of the year.    The patient's care was discussed with the treatment team during the daily team meeting and/or staff's chart notes were reviewed.  Staff report patient has been calm, pleasant, cooperative. Has been sleeping on and off during the day and evening. Denies SI. Visible in the milieu but keeping to herself.  Patient is eating well and taking medications as prescribed. Slept 8 hours.      Met with patient. Main concern is poor appetite and epigastric pain. States that she worries about not having a place to stay that causes stomach problems. Other than that, mood is good. Looks tired.  Discussed disposition. The patient would like to stay in [facility]. Worries that if she goes somewhere else, her kids and grand kids will not be able to come visit her. She understand that we don't have other options due to her SA most places have refused to accept her.          Medications:       amantadine  100 mg Oral BID     aspirin  81 mg Oral Daily     atorvastatin  80 mg Oral Daily     clonazePAM  0.5 mg Oral At Bedtime     " famotidine  40 mg Oral At Bedtime     gabapentin  300 mg Oral At Bedtime     insulin aspart  1-10 Units Subcutaneous BID AC     insulin aspart  10 Units Subcutaneous Daily with supper     insulin aspart  1-7 Units Subcutaneous At Bedtime     insulin glargine  60 Units Subcutaneous At Bedtime     losartan  50 mg Oral Daily     melatonin  10 mg Oral At Bedtime     metoprolol succinate ER  25 mg Oral Daily     nystatin   Topical BID     paliperidone ER  9 mg Oral At Bedtime     sertraline  200 mg Oral Daily     topiramate  50 mg Oral At Bedtime          Allergies:     Allergies   Allergen Reactions     Imidazole Antifungals Hives     Tolerates diflucan     Ketoprofen Itching     Pruritis to topical     Lisinopril Hives     Metformin Other (See Comments)     Patient hospitalized for lactic acidosis - admitting provider suspectd caused by metformin     Metronidazole Hives     Posaconazole Hives     Tolerates diflucan          Labs:     Recent Results (from the past 672 hour(s))   Hemoglobin A1c    Collection Time: 12/03/19 11:43 AM   Result Value Ref Range    Hemoglobin A1C 6.2 (H) 0 - 5.6 %   Comprehensive metabolic panel (BMP + Alb, Alk Phos, ALT, AST, Total. Bili, TP)    Collection Time: 12/03/19 11:43 AM   Result Value Ref Range    Sodium 139 133 - 144 mmol/L    Potassium 4.1 3.4 - 5.3 mmol/L    Chloride 110 (H) 94 - 109 mmol/L    Carbon Dioxide 21 20 - 32 mmol/L    Anion Gap 8 3 - 14 mmol/L    Glucose 146 (H) 70 - 99 mg/dL    Urea Nitrogen 16 7 - 30 mg/dL    Creatinine 0.91 0.52 - 1.04 mg/dL    GFR Estimate 69 >60 mL/min/[1.73_m2]    GFR Estimate If Black 80 >60 mL/min/[1.73_m2]    Calcium 8.9 8.5 - 10.1 mg/dL    Bilirubin Total 0.2 0.2 - 1.3 mg/dL    Albumin 3.4 3.4 - 5.0 g/dL    Protein Total 7.7 6.8 - 8.8 g/dL    Alkaline Phosphatase 90 40 - 150 U/L    ALT 26 0 - 50 U/L    AST 15 0 - 45 U/L   Drug Abuse Screen Panel 13, Urine (Pain Care Package)    Collection Time: 12/03/19 12:11 PM   Result Value Ref Range     Cannabinoids (92-hhi-9-carboxy-9-THC) Not Detected NDET^Not Detected ng/mL    Phencyclidine (Phencyclidine) Not Detected NDET^Not Detected ng/mL    Cocaine (Benzoylecgonine) Not Detected NDET^Not Detected ng/mL    Methamphetamine (d-Methamphetamine) Not Detected NDET^Not Detected ng/mL    Opiates (Morphine) Not Detected NDET^Not Detected ng/mL    Amphetamine (d-Amphetamine) Not Detected NDET^Not Detected ng/mL    Benzodiazepines (Nordiazepam) Not Detected NDET^Not Detected ng/mL    Tricyclic Antidepressants (Desipramine) Not Detected NDET^Not Detected ng/mL    Methadone (Methadone) Not Detected NDET^Not Detected ng/mL    Barbiturates (Butalbital) Not Detected NDET^Not Detected ng/mL    Oxycodone (Oxycodone) Not Detected NDET^Not Detected ng/mL    Propoxyphene (Norpropoxyphene) Not Detected NDET^Not Detected ng/mL    Buprenorphine (Buprenorphine) Not Detected NDET^Not Detected ng/mL   Glucose by meter    Collection Time: 12/20/19  4:04 PM   Result Value Ref Range    Glucose 219 (H) 70 - 99 mg/dL   Drug abuse screen 6 urine (chem dep)    Collection Time: 12/20/19  4:19 PM   Result Value Ref Range    Amphetamine Qual Urine Negative NEG^Negative    Barbiturates Qual Urine Negative NEG^Negative    Benzodiazepine Qual Urine Negative NEG^Negative    Cannabinoids Qual Urine Negative NEG^Negative    Cocaine Qual Urine Negative NEG^Negative    Ethanol Qual Urine Negative NEG^Negative    Opiates Qualitative Urine Negative NEG^Negative   Glucose by meter    Collection Time: 12/20/19  8:56 PM   Result Value Ref Range    Glucose 110 (H) 70 - 99 mg/dL   Glucose by meter    Collection Time: 12/20/19 11:13 PM   Result Value Ref Range    Glucose 190 (H) 70 - 99 mg/dL   Glucose by meter    Collection Time: 12/21/19  2:05 AM   Result Value Ref Range    Glucose 177 (H) 70 - 99 mg/dL   Glucose by meter    Collection Time: 12/21/19  7:45 AM   Result Value Ref Range    Glucose 148 (H) 70 - 99 mg/dL   Glucose by meter    Collection Time:  12/21/19 11:09 AM   Result Value Ref Range    Glucose 134 (H) 70 - 99 mg/dL   Glucose by meter    Collection Time: 12/21/19 12:26 PM   Result Value Ref Range    Glucose 123 (H) 70 - 99 mg/dL   Glucose by meter    Collection Time: 12/21/19  5:13 PM   Result Value Ref Range    Glucose 130 (H) 70 - 99 mg/dL   Glucose by meter    Collection Time: 12/21/19  9:57 PM   Result Value Ref Range    Glucose 158 (H) 70 - 99 mg/dL   Glucose by meter    Collection Time: 12/22/19  1:47 AM   Result Value Ref Range    Glucose 110 (H) 70 - 99 mg/dL   CBC with platelets differential    Collection Time: 12/22/19  6:51 AM   Result Value Ref Range    WBC 7.5 4.0 - 11.0 10e9/L    RBC Count 3.57 (L) 3.8 - 5.2 10e12/L    Hemoglobin 10.8 (L) 11.7 - 15.7 g/dL    Hematocrit 34.9 (L) 35.0 - 47.0 %    MCV 98 78 - 100 fl    MCH 30.3 26.5 - 33.0 pg    MCHC 30.9 (L) 31.5 - 36.5 g/dL    RDW 13.6 10.0 - 15.0 %    Platelet Count 221 150 - 450 10e9/L    Diff Method Automated Method     % Neutrophils 49.9 %    % Lymphocytes 40.2 %    % Monocytes 8.0 %    % Eosinophils 1.2 %    % Basophils 0.3 %    % Immature Granulocytes 0.4 %    Nucleated RBCs 0 0 /100    Absolute Neutrophil 3.8 1.6 - 8.3 10e9/L    Absolute Lymphocytes 3.0 0.8 - 5.3 10e9/L    Absolute Monocytes 0.6 0.0 - 1.3 10e9/L    Absolute Eosinophils 0.1 0.0 - 0.7 10e9/L    Absolute Basophils 0.0 0.0 - 0.2 10e9/L    Abs Immature Granulocytes 0.0 0 - 0.4 10e9/L    Absolute Nucleated RBC 0.0    Comprehensive metabolic panel    Collection Time: 12/22/19  6:51 AM   Result Value Ref Range    Sodium 141 133 - 144 mmol/L    Potassium 4.1 3.4 - 5.3 mmol/L    Chloride 111 (H) 94 - 109 mmol/L    Carbon Dioxide 22 20 - 32 mmol/L    Anion Gap 8 3 - 14 mmol/L    Glucose 111 (H) 70 - 99 mg/dL    Urea Nitrogen 16 7 - 30 mg/dL    Creatinine 0.86 0.52 - 1.04 mg/dL    GFR Estimate 74 >60 mL/min/[1.73_m2]    GFR Estimate If Black 85 >60 mL/min/[1.73_m2]    Calcium 8.7 8.5 - 10.1 mg/dL    Bilirubin Total 0.2 0.2 - 1.3  mg/dL    Albumin 3.2 (L) 3.4 - 5.0 g/dL    Protein Total 7.5 6.8 - 8.8 g/dL    Alkaline Phosphatase 88 40 - 150 U/L    ALT 22 0 - 50 U/L    AST 8 0 - 45 U/L   Lipid panel    Collection Time: 12/22/19  6:51 AM   Result Value Ref Range    Cholesterol 154 <200 mg/dL    Triglycerides 132 <150 mg/dL    HDL Cholesterol 42 (L) >49 mg/dL    LDL Cholesterol Calculated 86 <100 mg/dL    Non HDL Cholesterol 112 <130 mg/dL   TSH with free T4 reflex and/or T3 as indicated    Collection Time: 12/22/19  6:51 AM   Result Value Ref Range    TSH 1.23 0.40 - 4.00 mU/L   Folate    Collection Time: 12/22/19  6:51 AM   Result Value Ref Range    Folate 8.5 >5.4 ng/mL   Vitamin B12    Collection Time: 12/22/19  6:51 AM   Result Value Ref Range    Vitamin B12 358 193 - 986 pg/mL   Vitamin D    Collection Time: 12/22/19  6:51 AM   Result Value Ref Range    Vitamin D Deficiency screening 39 20 - 75 ug/L   Bilirubin direct    Collection Time: 12/22/19  6:51 AM   Result Value Ref Range    Bilirubin Direct <0.1 0.0 - 0.2 mg/dL   Glucose by meter    Collection Time: 12/22/19 12:17 PM   Result Value Ref Range    Glucose 238 (H) 70 - 99 mg/dL   UA with Microscopic reflex to Culture    Collection Time: 12/22/19  1:15 PM   Result Value Ref Range    Color Urine Yellow     Appearance Urine Clear     Glucose Urine 300 (A) NEG^Negative mg/dL    Bilirubin Urine Negative NEG^Negative    Ketones Urine Negative NEG^Negative mg/dL    Specific Gravity Urine 1.018 1.003 - 1.035    Blood Urine Negative NEG^Negative    pH Urine 6.0 5.0 - 7.0 pH    Protein Albumin Urine Negative NEG^Negative mg/dL    Urobilinogen mg/dL Normal 0.0 - 2.0 mg/dL    Nitrite Urine Negative NEG^Negative    Leukocyte Esterase Urine Negative NEG^Negative    Source Midstream Urine     WBC Urine <1 0 - 5 /HPF    RBC Urine 0 0 - 2 /HPF    Bacteria Urine Few (A) NEG^Negative /HPF    Squamous Epithelial /HPF Urine 1 0 - 1 /HPF   Glucose by meter    Collection Time: 12/22/19  4:32 PM   Result  Value Ref Range    Glucose 224 (H) 70 - 99 mg/dL   Glucose by meter    Collection Time: 12/22/19  9:07 PM   Result Value Ref Range    Glucose 204 (H) 70 - 99 mg/dL   Glucose by meter    Collection Time: 12/23/19  2:01 AM   Result Value Ref Range    Glucose 197 (H) 70 - 99 mg/dL   Glucose by meter    Collection Time: 12/23/19  7:57 AM   Result Value Ref Range    Glucose 132 (H) 70 - 99 mg/dL   Glucose by meter    Collection Time: 12/23/19 11:40 AM   Result Value Ref Range    Glucose 194 (H) 70 - 99 mg/dL   Glucose by meter    Collection Time: 12/23/19  4:43 PM   Result Value Ref Range    Glucose 151 (H) 70 - 99 mg/dL   Glucose by meter    Collection Time: 12/23/19  8:04 PM   Result Value Ref Range    Glucose 301 (H) 70 - 99 mg/dL   Glucose by meter    Collection Time: 12/23/19  9:12 PM   Result Value Ref Range    Glucose 286 (H) 70 - 99 mg/dL   Glucose by meter    Collection Time: 12/24/19  1:48 AM   Result Value Ref Range    Glucose 164 (H) 70 - 99 mg/dL   Glucose by meter    Collection Time: 12/24/19  8:39 AM   Result Value Ref Range    Glucose 101 (H) 70 - 99 mg/dL   Glucose by meter    Collection Time: 12/24/19 12:00 PM   Result Value Ref Range    Glucose 155 (H) 70 - 99 mg/dL   Glucose by meter    Collection Time: 12/24/19  5:11 PM   Result Value Ref Range    Glucose 144 (H) 70 - 99 mg/dL   Glucose by meter    Collection Time: 12/24/19  8:48 PM   Result Value Ref Range    Glucose 239 (H) 70 - 99 mg/dL   Glucose by meter    Collection Time: 12/25/19  2:05 AM   Result Value Ref Range    Glucose 147 (H) 70 - 99 mg/dL   Glucose by meter    Collection Time: 12/25/19  7:50 AM   Result Value Ref Range    Glucose 111 (H) 70 - 99 mg/dL   Glucose by meter    Collection Time: 12/25/19 12:14 PM   Result Value Ref Range    Glucose 199 (H) 70 - 99 mg/dL   Glucose by meter    Collection Time: 12/25/19  4:46 PM   Result Value Ref Range    Glucose 352 (H) 70 - 99 mg/dL   Glucose by meter    Collection Time: 12/25/19  9:04 PM    Result Value Ref Range    Glucose 338 (H) 70 - 99 mg/dL   Glucose by meter    Collection Time: 12/25/19  9:47 PM   Result Value Ref Range    Glucose 355 (H) 70 - 99 mg/dL   Glucose by meter    Collection Time: 12/26/19  1:49 AM   Result Value Ref Range    Glucose 194 (H) 70 - 99 mg/dL   Glucose by meter    Collection Time: 12/26/19  8:01 AM   Result Value Ref Range    Glucose 139 (H) 70 - 99 mg/dL   Glucose by meter    Collection Time: 12/26/19 12:11 PM   Result Value Ref Range    Glucose 223 (H) 70 - 99 mg/dL   Glucose by meter    Collection Time: 12/26/19  5:02 PM   Result Value Ref Range    Glucose 206 (H) 70 - 99 mg/dL   Glucose by meter    Collection Time: 12/26/19  8:11 PM   Result Value Ref Range    Glucose 337 (H) 70 - 99 mg/dL   Glucose by meter    Collection Time: 12/27/19  1:30 AM   Result Value Ref Range    Glucose 229 (H) 70 - 99 mg/dL   Glucose by meter    Collection Time: 12/27/19  7:57 AM   Result Value Ref Range    Glucose 165 (H) 70 - 99 mg/dL   Glucose by meter    Collection Time: 12/27/19 12:07 PM   Result Value Ref Range    Glucose 270 (H) 70 - 99 mg/dL   Glucose by meter    Collection Time: 12/27/19  5:17 PM   Result Value Ref Range    Glucose 232 (H) 70 - 99 mg/dL   Glucose by meter    Collection Time: 12/27/19  8:53 PM   Result Value Ref Range    Glucose 427 (H) 70 - 99 mg/dL   Glucose by meter    Collection Time: 12/27/19  9:06 PM   Result Value Ref Range    Glucose 428 (H) 70 - 99 mg/dL   Glucose by meter    Collection Time: 12/28/19  1:48 AM   Result Value Ref Range    Glucose 179 (H) 70 - 99 mg/dL   Glucose by meter    Collection Time: 12/28/19  8:06 AM   Result Value Ref Range    Glucose 119 (H) 70 - 99 mg/dL   Glucose by meter    Collection Time: 12/28/19 11:53 AM   Result Value Ref Range    Glucose 119 (H) 70 - 99 mg/dL   Glucose by meter    Collection Time: 12/28/19  4:51 PM   Result Value Ref Range    Glucose 230 (H) 70 - 99 mg/dL   Glucose by meter    Collection Time: 12/28/19   9:05 PM   Result Value Ref Range    Glucose 349 (H) 70 - 99 mg/dL   Glucose by meter    Collection Time: 12/29/19  1:52 AM   Result Value Ref Range    Glucose 154 (H) 70 - 99 mg/dL   Glucose by meter    Collection Time: 12/29/19  8:37 AM   Result Value Ref Range    Glucose 96 70 - 99 mg/dL   Glucose by meter    Collection Time: 12/29/19  9:17 AM   Result Value Ref Range    Glucose 165 (H) 70 - 99 mg/dL   Glucose by meter    Collection Time: 12/29/19 11:49 AM   Result Value Ref Range    Glucose 244 (H) 70 - 99 mg/dL   Glucose by meter    Collection Time: 12/29/19  3:56 PM   Result Value Ref Range    Glucose 232 (H) 70 - 99 mg/dL   Glucose by meter    Collection Time: 12/29/19  8:33 PM   Result Value Ref Range    Glucose 316 (H) 70 - 99 mg/dL   Glucose by meter    Collection Time: 12/30/19  1:45 AM   Result Value Ref Range    Glucose 203 (H) 70 - 99 mg/dL   Glucose by meter    Collection Time: 12/30/19  8:12 AM   Result Value Ref Range    Glucose 98 70 - 99 mg/dL   Glucose by meter    Collection Time: 12/30/19 11:24 AM   Result Value Ref Range    Glucose 205 (H) 70 - 99 mg/dL   Glucose by meter    Collection Time: 12/30/19 12:06 PM   Result Value Ref Range    Glucose 194 (H) 70 - 99 mg/dL            Psychiatric Examination:   Temp: 97.1  F (36.2  C) Temp src: Tympanic BP: 136/83 Pulse: 77     SpO2: 97 % O2 Device: None (Room air)    Weight is 235 lbs 0 oz  Body mass index is 45.9 kg/m .    Appearance: well groomed, awake, alert, cooperative, no apparent distress and moderately obese  Attitude:  cooperative  Eye Contact:  good  Mood:  good  Affect:  appropriate and in normal range  Speech:  clear, coherent  Psychomotor Behavior:  no evidence of tardive dyskinesia, dystonia, or tics  Throught Process:  logical and goal oriented  Associations:  no loose associations  Thought Content:  no evidence of suicidal ideation or homicidal ideation, no auditory hallucinations present and no visual hallucinations present  Insight:   "good  Judgement:  intact  Oriented to:  time, person, and place  Attention Span and Concentration:  intact  Recent and Remote Memory:  intact         Precautions:     Behavioral Orders   Procedures     Code 1 - Restrict to Unit     Fall precautions     Routine Programming     As clinically indicated     Self Injury Precaution     Status 15     Every 15 minutes.     Suicide precautions     Patients on Suicide Precautions should have a Combination Diet ordered that includes a Diet selection(s) AND a Behavioral Tray selection for Safe Tray - with utensils, or Safe Tray - NO utensils            DIagnoses:   1.  Schizoaffective disorder, bipolar type, currently depressed, with psychosis  2.  Borderline intellectual functioning  3.  Polysubstance abuse  4.  Medical problems include morbid obesity, type 2 diabetes, sleep apnea, multiple pain issues.         Plan:   The patient is a very pleasant, -American female who was admitted with increased depression, auditory visual hallucinations, and suicidal thoughts with a plan to overdose on her insulin.  Reported having a \"suicide trials\" by injecting herself with more insulin than she needs.  Reports that her medications are administered by the staff however, she is managing her insulin.  The patient was not able to identify ago.  Reports that the auditory visual hallucinations have been going on for the last 3 years.  She has been having intrusive thoughts of suicide.  She feels safe on the unit.  The patient was not able to discuss her medications since she does not know what she is taking.  Medication changes will include the following:   --Discontinue Cogentin.    --Start amantadine 100 mg twice a day.   --Cancel the day time dose of Gabapentin due to sedation. Continue 300 mg, at bedtime. .    --Change Invega to 9 mg bedtime.    --Discontinue the morning dose of Topamax.    --Continue Topamax 50 mg at bedtime.   --Continue Klonopin 0.5 mg at bedtime.    --Continue " melatonin 10 mg at bedtime.    --Discontinue naltrexone, the patient has been sober for 27 years.    --Continue Zoloft 200 mg every morning.    --Blood work was reviewed.    --Internal medicine follow-up for medical problems.    --The patient was consulted on nature of illness and treatment options.   --Care was coordinated with the treatment team.     Disposition Plan   Reason for ongoing admission: is unable to care for self due to depression, SI, AH/VH.   Disposition:group home  Estimated length of stay: 5-7 days  Legal Status:  voluntary  Discharge will be granted once symptoms improved.    Dede CORDOVA, CNP  Date: 12/30/19  Time: 2:40 PM

## 2019-12-30 NOTE — PROGRESS NOTES
12/30/19 2200   Art Therapy   Type of Intervention structured groups   Response Participated with encouragement   Hours 2   Treatment Detail    (Art Therapy - watercolor/ affirmations/ encouragement/ DBT ABC and PLEASE skills   Goal- cope, express, regulate and reflect through Art Therapy directives     Outcome- Pt attended 2 groups.She made a simple painting of a house. Sh said she didn't like or think she was good at painting. She said she was not having a good day. Writer observed her tearing up in the lounge. She was trying to be in attendance and she smiled when her musical requests were played and that writer knew of the music and that music was of writer's era too. She said she was distressed about something having to do with her kids but did not elaborate.

## 2019-12-30 NOTE — PLAN OF CARE
Problem: General Plan of Care (Inpatient Behavioral)  Goal: Individualization/Patient Specific Goal (IP Behavioral)  Description  The patient and/or their representative will achieve their patient-specific goals related to the plan of care.    The patient-specific goals include:  Flowsheets (Taken 12/30/2019 1205)  Areas of Vulnerability: Lost her group home and needs placement, suicide attempt  Patient Strengths: Stable and supportive family; Utilized support systems; Engagement in hobbies, sports, arts, clubs    The patient completed the reasons for admit and goals for discharge in the personal plan of care.   Reasons for admit:  1)  Depression   2)  Suicide attempt    Goals for discharge:  1)  Mood stability  2)  Absence of suicidal ideation  3)  Placement in nursing home

## 2019-12-31 LAB
GLUCOSE BLDC GLUCOMTR-MCNC: 118 MG/DL (ref 70–99)
GLUCOSE BLDC GLUCOMTR-MCNC: 138 MG/DL (ref 70–99)
GLUCOSE BLDC GLUCOMTR-MCNC: 154 MG/DL (ref 70–99)
GLUCOSE BLDC GLUCOMTR-MCNC: 231 MG/DL (ref 70–99)
GLUCOSE BLDC GLUCOMTR-MCNC: 247 MG/DL (ref 70–99)
GLUCOSE BLDC GLUCOMTR-MCNC: 81 MG/DL (ref 70–99)

## 2019-12-31 PROCEDURE — 99207 ZZC NO CHARGE VISIT/PATIENT NOT SEEN: CPT | Performed by: PHYSICIAN ASSISTANT

## 2019-12-31 PROCEDURE — 25000132 ZZH RX MED GY IP 250 OP 250 PS 637: Mod: GY | Performed by: NURSE PRACTITIONER

## 2019-12-31 PROCEDURE — 12400002 ZZH R&B MH SENIOR/ADOLESCENT

## 2019-12-31 PROCEDURE — 25000132 ZZH RX MED GY IP 250 OP 250 PS 637: Mod: GY | Performed by: PSYCHIATRY & NEUROLOGY

## 2019-12-31 PROCEDURE — G0177 OPPS/PHP; TRAIN & EDUC SERV: HCPCS

## 2019-12-31 PROCEDURE — 25000132 ZZH RX MED GY IP 250 OP 250 PS 637: Mod: GY | Performed by: PHYSICIAN ASSISTANT

## 2019-12-31 PROCEDURE — H2032 ACTIVITY THERAPY, PER 15 MIN: HCPCS

## 2019-12-31 PROCEDURE — 99232 SBSQ HOSP IP/OBS MODERATE 35: CPT | Performed by: NURSE PRACTITIONER

## 2019-12-31 PROCEDURE — 00000146 ZZHCL STATISTIC GLUCOSE BY METER IP

## 2019-12-31 RX ORDER — ZINC OXIDE
OINTMENT (GRAM) TOPICAL PRN
Qty: 56 G | Refills: 0
Start: 2019-12-31 | End: 2021-09-25

## 2019-12-31 RX ORDER — IBUPROFEN 200 MG
200 TABLET ORAL EVERY 8 HOURS PRN
Start: 2019-12-31 | End: 2020-05-18 | Stop reason: ALTCHOICE

## 2019-12-31 RX ORDER — AMANTADINE HYDROCHLORIDE 100 MG/1
100 CAPSULE, GELATIN COATED ORAL 2 TIMES DAILY
Start: 2019-12-31 | End: 2020-05-18

## 2019-12-31 RX ORDER — POLYETHYLENE GLYCOL 3350 17 G/17G
17 POWDER, FOR SOLUTION ORAL DAILY PRN
Start: 2019-12-31 | End: 2020-06-01

## 2019-12-31 RX ORDER — GUAIFENESIN AND DEXTROMETHORPHAN HYDROBROMIDE 600; 30 MG/1; MG/1
1 TABLET, EXTENDED RELEASE ORAL 2 TIMES DAILY PRN
Start: 2019-12-31 | End: 2020-05-18

## 2019-12-31 RX ORDER — NYSTATIN 100000 U/G
CREAM TOPICAL 2 TIMES DAILY
Qty: 30 G | Refills: 3
Start: 2019-12-31 | End: 2021-06-21

## 2019-12-31 RX ORDER — CLONAZEPAM 0.5 MG/1
0.5 TABLET ORAL AT BEDTIME
Qty: 30 TABLET | Refills: 0 | Status: SHIPPED | OUTPATIENT
Start: 2019-12-31 | End: 2020-05-19

## 2019-12-31 RX ORDER — GABAPENTIN 300 MG/1
300 CAPSULE ORAL AT BEDTIME
Start: 2019-12-31 | End: 2020-05-18

## 2019-12-31 RX ORDER — HYDROXYZINE HYDROCHLORIDE 25 MG/1
25 TABLET, FILM COATED ORAL EVERY 4 HOURS PRN
Start: 2019-12-31 | End: 2020-05-20

## 2019-12-31 RX ORDER — LOPERAMIDE HCL 2 MG
2 CAPSULE ORAL 4 TIMES DAILY PRN
Start: 2019-12-31 | End: 2020-05-18

## 2019-12-31 RX ORDER — TOPIRAMATE 50 MG/1
50 TABLET, FILM COATED ORAL AT BEDTIME
Start: 2019-12-31 | End: 2020-05-18

## 2019-12-31 RX ORDER — IPRATROPIUM BROMIDE AND ALBUTEROL SULFATE 2.5; .5 MG/3ML; MG/3ML
1 SOLUTION RESPIRATORY (INHALATION) EVERY 6 HOURS PRN
Qty: 30 VIAL | Refills: 0
Start: 2019-12-31 | End: 2021-09-02

## 2019-12-31 RX ORDER — TRAZODONE HYDROCHLORIDE 50 MG/1
50 TABLET, FILM COATED ORAL
Start: 2019-12-31 | End: 2020-05-18

## 2019-12-31 RX ORDER — PALIPERIDONE 9 MG/1
9 TABLET, EXTENDED RELEASE ORAL AT BEDTIME
Start: 2019-12-31 | End: 2020-05-18

## 2019-12-31 RX ADMIN — PALIPERIDONE 9 MG: 9 TABLET, EXTENDED RELEASE ORAL at 21:25

## 2019-12-31 RX ADMIN — FAMOTIDINE 40 MG: 20 TABLET ORAL at 21:25

## 2019-12-31 RX ADMIN — ASPIRIN 81 MG: 81 TABLET ORAL at 08:33

## 2019-12-31 RX ADMIN — SENNOSIDES AND DOCUSATE SODIUM 1 TABLET: 8.6; 5 TABLET ORAL at 14:02

## 2019-12-31 RX ADMIN — GUAIFENESIN AND DEXTROMETHORPHAN HYDROBROMIDE 1 TABLET: 600; 30 TABLET, EXTENDED RELEASE ORAL at 21:33

## 2019-12-31 RX ADMIN — AMANTADINE HYDROCHLORIDE 100 MG: 100 CAPSULE ORAL at 08:33

## 2019-12-31 RX ADMIN — TRAZODONE HYDROCHLORIDE 50 MG: 50 TABLET ORAL at 01:46

## 2019-12-31 RX ADMIN — LOSARTAN POTASSIUM 50 MG: 50 TABLET, FILM COATED ORAL at 13:08

## 2019-12-31 RX ADMIN — ACETAMINOPHEN 650 MG: 325 TABLET, FILM COATED ORAL at 14:47

## 2019-12-31 RX ADMIN — ATORVASTATIN CALCIUM 80 MG: 80 TABLET, FILM COATED ORAL at 08:33

## 2019-12-31 RX ADMIN — GABAPENTIN 300 MG: 300 CAPSULE ORAL at 21:25

## 2019-12-31 RX ADMIN — TOPIRAMATE 50 MG: 50 TABLET ORAL at 21:25

## 2019-12-31 RX ADMIN — SERTRALINE HYDROCHLORIDE 200 MG: 100 TABLET ORAL at 08:33

## 2019-12-31 RX ADMIN — AMANTADINE HYDROCHLORIDE 100 MG: 100 CAPSULE ORAL at 21:25

## 2019-12-31 RX ADMIN — CLONAZEPAM 0.5 MG: 0.5 TABLET ORAL at 21:25

## 2019-12-31 RX ADMIN — NYSTATIN: 100000 CREAM TOPICAL at 21:30

## 2019-12-31 RX ADMIN — METOPROLOL SUCCINATE 25 MG: 25 TABLET, EXTENDED RELEASE ORAL at 13:08

## 2019-12-31 RX ADMIN — Medication 10 MG: at 21:25

## 2019-12-31 RX ADMIN — ACETAMINOPHEN 650 MG: 325 TABLET, FILM COATED ORAL at 10:32

## 2019-12-31 RX ADMIN — POLYETHYLENE GLYCOL 3350 17 G: 17 POWDER, FOR SOLUTION ORAL at 14:02

## 2019-12-31 ASSESSMENT — ACTIVITIES OF DAILY LIVING (ADL)
DRESS: INDEPENDENT
LAUNDRY: UNABLE TO COMPLETE
HYGIENE/GROOMING: INDEPENDENT
LAUNDRY: UNABLE TO COMPLETE
DRESS: INDEPENDENT
HYGIENE/GROOMING: INDEPENDENT
ORAL_HYGIENE: INDEPENDENT
ORAL_HYGIENE: INDEPENDENT

## 2019-12-31 ASSESSMENT — MIFFLIN-ST. JEOR: SCORE: 1567.45

## 2019-12-31 NOTE — PROGRESS NOTES
"Pt expressed herself almost completely nonverbally with subtle, but rich internally-motivated rhythmic movements.  She increased her \"groove\" when others joined her movement and at one point she smiled broadly during a non-verbal interaction with a peer.    She advocated for her own needs at the end of the session and expressed gratitude.       12/31/19 8400   Dance Movement Therapy   Type of Intervention structured groups   Response participates with encouragement   Hours 1       "

## 2019-12-31 NOTE — PROGRESS NOTES
Patient is calm with a flat affect. Patient was present in the lounge most of the shift but withdrawn. Patient denied SI/SIB nor wishing herself to be dead. Patient is med compliant. Patient ate almost 100% of her dinner. Her BS were 216 and 239. She was given a coverage of 2 Units of Novolog for 239 reading.

## 2019-12-31 NOTE — PROGRESS NOTES
"[facility], [place]   Psychiatric Progress Note        Interim History:   From H&P: Nena Tang is a 59 year old female with history of schizoaffective disorder, polysubstance abuse, type 2 diabetes, sleep apnea, and borderline intellectual functioning.  The patient went to the emergency room for evaluation of increased depression, and suicidal ideation.  The patient lives in a group home.  Reported having increasing thoughts of cutting or overdosing on insulin as a suicide attempt.  The patient reported that for several days, she has been taking extra insulin \"as a practice attempt\".  She reported having auditory hallucinations of seeing a man in in [place] and red eyes telling her to kill herself if she uses her CPAP machines.  She stopped using the CPAP.  She reported that the holidays are difficult for her as she lost multiple family members around this time of the year.    The patient's care was discussed with the treatment team during the daily team meeting and/or staff's chart notes were reviewed.  Staff report patient has been calm, pleasant, cooperative. Has been sleeping on and off during the day and evening. Denies SI. Visible in the milieu but keeping to herself.  Patient is taking medications as prescribed. Ate 100% of her diner. Slept 8 hours.      Met with patient. Feels better. Is smiling. Appetite improved. Slept well. No hallucinations. Denies SI. Agreeable to discharge to nursing home. Tentative discharge later in the week.          Medications:       amantadine  100 mg Oral BID     aspirin  81 mg Oral Daily     atorvastatin  80 mg Oral Daily     clonazePAM  0.5 mg Oral At Bedtime     famotidine  40 mg Oral At Bedtime     gabapentin  300 mg Oral At Bedtime     insulin aspart  1-10 Units Subcutaneous BID AC     insulin aspart  10 Units Subcutaneous Daily with supper     insulin aspart  1-7 Units Subcutaneous At Bedtime     insulin glargine  60 Units Subcutaneous At " Bedtime     losartan  50 mg Oral Daily     melatonin  10 mg Oral At Bedtime     metoprolol succinate ER  25 mg Oral Daily     nystatin   Topical BID     paliperidone ER  9 mg Oral At Bedtime     sertraline  200 mg Oral Daily     topiramate  50 mg Oral At Bedtime          Allergies:     Allergies   Allergen Reactions     Imidazole Antifungals Hives     Tolerates diflucan     Ketoprofen Itching     Pruritis to topical     Lisinopril Hives     Metformin Other (See Comments)     Patient hospitalized for lactic acidosis - admitting provider suspectd caused by metformin     Metronidazole Hives     Posaconazole Hives     Tolerates diflucan          Labs:     Recent Results (from the past 672 hour(s))   Hemoglobin A1c    Collection Time: 12/03/19 11:43 AM   Result Value Ref Range    Hemoglobin A1C 6.2 (H) 0 - 5.6 %   Comprehensive metabolic panel (BMP + Alb, Alk Phos, ALT, AST, Total. Bili, TP)    Collection Time: 12/03/19 11:43 AM   Result Value Ref Range    Sodium 139 133 - 144 mmol/L    Potassium 4.1 3.4 - 5.3 mmol/L    Chloride 110 (H) 94 - 109 mmol/L    Carbon Dioxide 21 20 - 32 mmol/L    Anion Gap 8 3 - 14 mmol/L    Glucose 146 (H) 70 - 99 mg/dL    Urea Nitrogen 16 7 - 30 mg/dL    Creatinine 0.91 0.52 - 1.04 mg/dL    GFR Estimate 69 >60 mL/min/[1.73_m2]    GFR Estimate If Black 80 >60 mL/min/[1.73_m2]    Calcium 8.9 8.5 - 10.1 mg/dL    Bilirubin Total 0.2 0.2 - 1.3 mg/dL    Albumin 3.4 3.4 - 5.0 g/dL    Protein Total 7.7 6.8 - 8.8 g/dL    Alkaline Phosphatase 90 40 - 150 U/L    ALT 26 0 - 50 U/L    AST 15 0 - 45 U/L   Drug Abuse Screen Panel 13, Urine (Pain Care Package)    Collection Time: 12/03/19 12:11 PM   Result Value Ref Range    Cannabinoids (89-hov-5-carboxy-9-THC) Not Detected NDET^Not Detected ng/mL    Phencyclidine (Phencyclidine) Not Detected NDET^Not Detected ng/mL    Cocaine (Benzoylecgonine) Not Detected NDET^Not Detected ng/mL    Methamphetamine (d-Methamphetamine) Not Detected NDET^Not Detected ng/mL     Opiates (Morphine) Not Detected NDET^Not Detected ng/mL    Amphetamine (d-Amphetamine) Not Detected NDET^Not Detected ng/mL    Benzodiazepines (Nordiazepam) Not Detected NDET^Not Detected ng/mL    Tricyclic Antidepressants (Desipramine) Not Detected NDET^Not Detected ng/mL    Methadone (Methadone) Not Detected NDET^Not Detected ng/mL    Barbiturates (Butalbital) Not Detected NDET^Not Detected ng/mL    Oxycodone (Oxycodone) Not Detected NDET^Not Detected ng/mL    Propoxyphene (Norpropoxyphene) Not Detected NDET^Not Detected ng/mL    Buprenorphine (Buprenorphine) Not Detected NDET^Not Detected ng/mL   Glucose by meter    Collection Time: 12/20/19  4:04 PM   Result Value Ref Range    Glucose 219 (H) 70 - 99 mg/dL   Drug abuse screen 6 urine (chem dep)    Collection Time: 12/20/19  4:19 PM   Result Value Ref Range    Amphetamine Qual Urine Negative NEG^Negative    Barbiturates Qual Urine Negative NEG^Negative    Benzodiazepine Qual Urine Negative NEG^Negative    Cannabinoids Qual Urine Negative NEG^Negative    Cocaine Qual Urine Negative NEG^Negative    Ethanol Qual Urine Negative NEG^Negative    Opiates Qualitative Urine Negative NEG^Negative   Glucose by meter    Collection Time: 12/20/19  8:56 PM   Result Value Ref Range    Glucose 110 (H) 70 - 99 mg/dL   Glucose by meter    Collection Time: 12/20/19 11:13 PM   Result Value Ref Range    Glucose 190 (H) 70 - 99 mg/dL   Glucose by meter    Collection Time: 12/21/19  2:05 AM   Result Value Ref Range    Glucose 177 (H) 70 - 99 mg/dL   Glucose by meter    Collection Time: 12/21/19  7:45 AM   Result Value Ref Range    Glucose 148 (H) 70 - 99 mg/dL   Glucose by meter    Collection Time: 12/21/19 11:09 AM   Result Value Ref Range    Glucose 134 (H) 70 - 99 mg/dL   Glucose by meter    Collection Time: 12/21/19 12:26 PM   Result Value Ref Range    Glucose 123 (H) 70 - 99 mg/dL   Glucose by meter    Collection Time: 12/21/19  5:13 PM   Result Value Ref Range    Glucose 130  (H) 70 - 99 mg/dL   Glucose by meter    Collection Time: 12/21/19  9:57 PM   Result Value Ref Range    Glucose 158 (H) 70 - 99 mg/dL   Glucose by meter    Collection Time: 12/22/19  1:47 AM   Result Value Ref Range    Glucose 110 (H) 70 - 99 mg/dL   CBC with platelets differential    Collection Time: 12/22/19  6:51 AM   Result Value Ref Range    WBC 7.5 4.0 - 11.0 10e9/L    RBC Count 3.57 (L) 3.8 - 5.2 10e12/L    Hemoglobin 10.8 (L) 11.7 - 15.7 g/dL    Hematocrit 34.9 (L) 35.0 - 47.0 %    MCV 98 78 - 100 fl    MCH 30.3 26.5 - 33.0 pg    MCHC 30.9 (L) 31.5 - 36.5 g/dL    RDW 13.6 10.0 - 15.0 %    Platelet Count 221 150 - 450 10e9/L    Diff Method Automated Method     % Neutrophils 49.9 %    % Lymphocytes 40.2 %    % Monocytes 8.0 %    % Eosinophils 1.2 %    % Basophils 0.3 %    % Immature Granulocytes 0.4 %    Nucleated RBCs 0 0 /100    Absolute Neutrophil 3.8 1.6 - 8.3 10e9/L    Absolute Lymphocytes 3.0 0.8 - 5.3 10e9/L    Absolute Monocytes 0.6 0.0 - 1.3 10e9/L    Absolute Eosinophils 0.1 0.0 - 0.7 10e9/L    Absolute Basophils 0.0 0.0 - 0.2 10e9/L    Abs Immature Granulocytes 0.0 0 - 0.4 10e9/L    Absolute Nucleated RBC 0.0    Comprehensive metabolic panel    Collection Time: 12/22/19  6:51 AM   Result Value Ref Range    Sodium 141 133 - 144 mmol/L    Potassium 4.1 3.4 - 5.3 mmol/L    Chloride 111 (H) 94 - 109 mmol/L    Carbon Dioxide 22 20 - 32 mmol/L    Anion Gap 8 3 - 14 mmol/L    Glucose 111 (H) 70 - 99 mg/dL    Urea Nitrogen 16 7 - 30 mg/dL    Creatinine 0.86 0.52 - 1.04 mg/dL    GFR Estimate 74 >60 mL/min/[1.73_m2]    GFR Estimate If Black 85 >60 mL/min/[1.73_m2]    Calcium 8.7 8.5 - 10.1 mg/dL    Bilirubin Total 0.2 0.2 - 1.3 mg/dL    Albumin 3.2 (L) 3.4 - 5.0 g/dL    Protein Total 7.5 6.8 - 8.8 g/dL    Alkaline Phosphatase 88 40 - 150 U/L    ALT 22 0 - 50 U/L    AST 8 0 - 45 U/L   Lipid panel    Collection Time: 12/22/19  6:51 AM   Result Value Ref Range    Cholesterol 154 <200 mg/dL    Triglycerides 132  <150 mg/dL    HDL Cholesterol 42 (L) >49 mg/dL    LDL Cholesterol Calculated 86 <100 mg/dL    Non HDL Cholesterol 112 <130 mg/dL   TSH with free T4 reflex and/or T3 as indicated    Collection Time: 12/22/19  6:51 AM   Result Value Ref Range    TSH 1.23 0.40 - 4.00 mU/L   Folate    Collection Time: 12/22/19  6:51 AM   Result Value Ref Range    Folate 8.5 >5.4 ng/mL   Vitamin B12    Collection Time: 12/22/19  6:51 AM   Result Value Ref Range    Vitamin B12 358 193 - 986 pg/mL   Vitamin D    Collection Time: 12/22/19  6:51 AM   Result Value Ref Range    Vitamin D Deficiency screening 39 20 - 75 ug/L   Bilirubin direct    Collection Time: 12/22/19  6:51 AM   Result Value Ref Range    Bilirubin Direct <0.1 0.0 - 0.2 mg/dL   Glucose by meter    Collection Time: 12/22/19 12:17 PM   Result Value Ref Range    Glucose 238 (H) 70 - 99 mg/dL   UA with Microscopic reflex to Culture    Collection Time: 12/22/19  1:15 PM   Result Value Ref Range    Color Urine Yellow     Appearance Urine Clear     Glucose Urine 300 (A) NEG^Negative mg/dL    Bilirubin Urine Negative NEG^Negative    Ketones Urine Negative NEG^Negative mg/dL    Specific Gravity Urine 1.018 1.003 - 1.035    Blood Urine Negative NEG^Negative    pH Urine 6.0 5.0 - 7.0 pH    Protein Albumin Urine Negative NEG^Negative mg/dL    Urobilinogen mg/dL Normal 0.0 - 2.0 mg/dL    Nitrite Urine Negative NEG^Negative    Leukocyte Esterase Urine Negative NEG^Negative    Source Midstream Urine     WBC Urine <1 0 - 5 /HPF    RBC Urine 0 0 - 2 /HPF    Bacteria Urine Few (A) NEG^Negative /HPF    Squamous Epithelial /HPF Urine 1 0 - 1 /HPF   Glucose by meter    Collection Time: 12/22/19  4:32 PM   Result Value Ref Range    Glucose 224 (H) 70 - 99 mg/dL   Glucose by meter    Collection Time: 12/22/19  9:07 PM   Result Value Ref Range    Glucose 204 (H) 70 - 99 mg/dL   Glucose by meter    Collection Time: 12/23/19  2:01 AM   Result Value Ref Range    Glucose 197 (H) 70 - 99 mg/dL   Glucose  by meter    Collection Time: 12/23/19  7:57 AM   Result Value Ref Range    Glucose 132 (H) 70 - 99 mg/dL   Glucose by meter    Collection Time: 12/23/19 11:40 AM   Result Value Ref Range    Glucose 194 (H) 70 - 99 mg/dL   Glucose by meter    Collection Time: 12/23/19  4:43 PM   Result Value Ref Range    Glucose 151 (H) 70 - 99 mg/dL   Glucose by meter    Collection Time: 12/23/19  8:04 PM   Result Value Ref Range    Glucose 301 (H) 70 - 99 mg/dL   Glucose by meter    Collection Time: 12/23/19  9:12 PM   Result Value Ref Range    Glucose 286 (H) 70 - 99 mg/dL   Glucose by meter    Collection Time: 12/24/19  1:48 AM   Result Value Ref Range    Glucose 164 (H) 70 - 99 mg/dL   Glucose by meter    Collection Time: 12/24/19  8:39 AM   Result Value Ref Range    Glucose 101 (H) 70 - 99 mg/dL   Glucose by meter    Collection Time: 12/24/19 12:00 PM   Result Value Ref Range    Glucose 155 (H) 70 - 99 mg/dL   Glucose by meter    Collection Time: 12/24/19  5:11 PM   Result Value Ref Range    Glucose 144 (H) 70 - 99 mg/dL   Glucose by meter    Collection Time: 12/24/19  8:48 PM   Result Value Ref Range    Glucose 239 (H) 70 - 99 mg/dL   Glucose by meter    Collection Time: 12/25/19  2:05 AM   Result Value Ref Range    Glucose 147 (H) 70 - 99 mg/dL   Glucose by meter    Collection Time: 12/25/19  7:50 AM   Result Value Ref Range    Glucose 111 (H) 70 - 99 mg/dL   Glucose by meter    Collection Time: 12/25/19 12:14 PM   Result Value Ref Range    Glucose 199 (H) 70 - 99 mg/dL   Glucose by meter    Collection Time: 12/25/19  4:46 PM   Result Value Ref Range    Glucose 352 (H) 70 - 99 mg/dL   Glucose by meter    Collection Time: 12/25/19  9:04 PM   Result Value Ref Range    Glucose 338 (H) 70 - 99 mg/dL   Glucose by meter    Collection Time: 12/25/19  9:47 PM   Result Value Ref Range    Glucose 355 (H) 70 - 99 mg/dL   Glucose by meter    Collection Time: 12/26/19  1:49 AM   Result Value Ref Range    Glucose 194 (H) 70 - 99 mg/dL    Glucose by meter    Collection Time: 12/26/19  8:01 AM   Result Value Ref Range    Glucose 139 (H) 70 - 99 mg/dL   Glucose by meter    Collection Time: 12/26/19 12:11 PM   Result Value Ref Range    Glucose 223 (H) 70 - 99 mg/dL   Glucose by meter    Collection Time: 12/26/19  5:02 PM   Result Value Ref Range    Glucose 206 (H) 70 - 99 mg/dL   Glucose by meter    Collection Time: 12/26/19  8:11 PM   Result Value Ref Range    Glucose 337 (H) 70 - 99 mg/dL   Glucose by meter    Collection Time: 12/27/19  1:30 AM   Result Value Ref Range    Glucose 229 (H) 70 - 99 mg/dL   Glucose by meter    Collection Time: 12/27/19  7:57 AM   Result Value Ref Range    Glucose 165 (H) 70 - 99 mg/dL   Glucose by meter    Collection Time: 12/27/19 12:07 PM   Result Value Ref Range    Glucose 270 (H) 70 - 99 mg/dL   Glucose by meter    Collection Time: 12/27/19  5:17 PM   Result Value Ref Range    Glucose 232 (H) 70 - 99 mg/dL   Glucose by meter    Collection Time: 12/27/19  8:53 PM   Result Value Ref Range    Glucose 427 (H) 70 - 99 mg/dL   Glucose by meter    Collection Time: 12/27/19  9:06 PM   Result Value Ref Range    Glucose 428 (H) 70 - 99 mg/dL   Glucose by meter    Collection Time: 12/28/19  1:48 AM   Result Value Ref Range    Glucose 179 (H) 70 - 99 mg/dL   Glucose by meter    Collection Time: 12/28/19  8:06 AM   Result Value Ref Range    Glucose 119 (H) 70 - 99 mg/dL   Glucose by meter    Collection Time: 12/28/19 11:53 AM   Result Value Ref Range    Glucose 119 (H) 70 - 99 mg/dL   Glucose by meter    Collection Time: 12/28/19  4:51 PM   Result Value Ref Range    Glucose 230 (H) 70 - 99 mg/dL   Glucose by meter    Collection Time: 12/28/19  9:05 PM   Result Value Ref Range    Glucose 349 (H) 70 - 99 mg/dL   Glucose by meter    Collection Time: 12/29/19  1:52 AM   Result Value Ref Range    Glucose 154 (H) 70 - 99 mg/dL   Glucose by meter    Collection Time: 12/29/19  8:37 AM   Result Value Ref Range    Glucose 96 70 - 99 mg/dL    Glucose by meter    Collection Time: 12/29/19  9:17 AM   Result Value Ref Range    Glucose 165 (H) 70 - 99 mg/dL   Glucose by meter    Collection Time: 12/29/19 11:49 AM   Result Value Ref Range    Glucose 244 (H) 70 - 99 mg/dL   Glucose by meter    Collection Time: 12/29/19  3:56 PM   Result Value Ref Range    Glucose 232 (H) 70 - 99 mg/dL   Glucose by meter    Collection Time: 12/29/19  8:33 PM   Result Value Ref Range    Glucose 316 (H) 70 - 99 mg/dL   Glucose by meter    Collection Time: 12/30/19  1:45 AM   Result Value Ref Range    Glucose 203 (H) 70 - 99 mg/dL   Glucose by meter    Collection Time: 12/30/19  8:12 AM   Result Value Ref Range    Glucose 98 70 - 99 mg/dL   Glucose by meter    Collection Time: 12/30/19 11:24 AM   Result Value Ref Range    Glucose 205 (H) 70 - 99 mg/dL   Glucose by meter    Collection Time: 12/30/19 12:06 PM   Result Value Ref Range    Glucose 194 (H) 70 - 99 mg/dL   Glucose by meter    Collection Time: 12/30/19  4:45 PM   Result Value Ref Range    Glucose 216 (H) 70 - 99 mg/dL   Glucose by meter    Collection Time: 12/30/19  9:17 PM   Result Value Ref Range    Glucose 239 (H) 70 - 99 mg/dL   Glucose by meter    Collection Time: 12/31/19  1:40 AM   Result Value Ref Range    Glucose 138 (H) 70 - 99 mg/dL            Psychiatric Examination:   Temp: 98.7  F (37.1  C) Temp src: Tympanic BP: (!) 142/82 Pulse: 74   Resp: 16 SpO2: 97 % O2 Device: None (Room air)    Weight is 235 lbs 0 oz  Body mass index is 45.9 kg/m .    Appearance: well groomed, awake, alert, cooperative, no apparent distress and moderately obese  Attitude:  cooperative  Eye Contact:  good  Mood:  good  Affect:  appropriate and in normal range  Speech:  clear, coherent  Psychomotor Behavior:  no evidence of tardive dyskinesia, dystonia, or tics  Throught Process:  logical and goal oriented  Associations:  no loose associations  Thought Content:  no evidence of suicidal ideation or homicidal ideation, no auditory  "hallucinations present and no visual hallucinations present  Insight:  good  Judgement:  intact  Oriented to:  time, person, and place  Attention Span and Concentration:  intact  Recent and Remote Memory:  intact         Precautions:     Behavioral Orders   Procedures     Code 1 - Restrict to Unit     Fall precautions     Routine Programming     As clinically indicated     Self Injury Precaution     Status 15     Every 15 minutes.     Suicide precautions     Patients on Suicide Precautions should have a Combination Diet ordered that includes a Diet selection(s) AND a Behavioral Tray selection for Safe Tray - with utensils, or Safe Tray - NO utensils            DIagnoses:   1.  Schizoaffective disorder, bipolar type, currently depressed, with psychosis  2.  Borderline intellectual functioning  3.  Polysubstance abuse  4.  Medical problems include morbid obesity, type 2 diabetes, sleep apnea, multiple pain issues.         Plan:   The patient is a very pleasant, -American female who was admitted with increased depression, auditory visual hallucinations, and suicidal thoughts with a plan to overdose on her insulin.  Reported having a \"suicide trials\" by injecting herself with more insulin than she needs.  Reports that her medications are administered by the staff however, she is managing her insulin.  The patient was not able to identify ago.  Reports that the auditory visual hallucinations have been going on for the last 3 years.  She has been having intrusive thoughts of suicide.  She feels safe on the unit.  The patient was not able to discuss her medications since she does not know what she is taking.  Medication changes will include the following:   --Discontinue Cogentin.    --Start amantadine 100 mg twice a day.   --Cancel the day time dose of Gabapentin due to sedation. Continue 300 mg, at bedtime.    --Change Invega to 9 mg bedtime.    --Discontinue the morning dose of Topamax.    --Continue Topamax 50 " mg at bedtime.   --Continue Klonopin 0.5 mg at bedtime.    --Continue melatonin 10 mg at bedtime.    --Discontinue naltrexone, the patient has been sober for 27 years.    --Continue Zoloft 200 mg every morning.    --Blood work was reviewed.    --Internal medicine follow-up for medical problems.    --The patient was consulted on nature of illness and treatment options.   --Care was coordinated with the treatment team.     Disposition Plan   Reason for ongoing admission: is unable to care for self due to depression, SI, AH/VH.   Disposition:group home  Estimated length of stay: 5-7 days  Legal Status:  voluntary  Discharge will be granted once symptoms improved.      Dede CORDOVA, CNP  Date: 12/31/19  Time: 9:47 AM

## 2019-12-31 NOTE — PLAN OF CARE
Problem: OT General Care Plan  Goal: OT Goal 1  Description  Will attend OT groups improve concentration and comfort with engaging in more structured and success oriented options.   Note:   Attended 1 of 2 OT groups and stayed the full session. Initially, she stated plans to be present and not work on any task. With several comments of encouragement and redirection, she worked at a leisurely pace, accomplishing little though made decisions and followed through on plans. Affect appears flat. She offers direct eye contact and appears attentive to others.

## 2019-12-31 NOTE — PROGRESS NOTES
BS of 138 at 0200.  Patient also reporting difficulty sleeping.  PRN trazodone 50 mg given.  Patient slept for 8.25 hrs.

## 2019-12-31 NOTE — PLAN OF CARE
Pt is out in the milieu.Compliant to medications. Mood is calm, affect full range.  Denies SI,SIB,HI, nor wish to bed dead. Pt. Did not mention being depressed nor anxious.  Attends and participates in groups. BG was 81 at 7:20 and 118 at 12:20. Had 18 gms of carbs for breakfast, 315 at lunch( whopper burger 291 gm). Cozaar 50 mg po and Metoprolol 25 mg po given at 13:00 BP of 155/87,HR 84 .(BP standing did not meet parameter in AM) Was c/o dizziness after using her bathroom. Dolores Howard PA-C text paged regarding the above mentioned dizziness.    Pt encouraged fluid intake. Pt endorses feeling a bit better with her dizziness. Pt reeducated to move/change position slowly. Pt received  Senna 1 tab and Miralax.      Tylenol 650 mg po for headache, helped .

## 2019-12-31 NOTE — PROGRESS NOTES
Brief Medicine Note    Notified by nursing staff that patient will be discharging to nursing home. Medicine has been following for insulin management.     The patient's PTA regimen is Lantus 60 units every evening, aspart 20 units TID at meals. Blood glucose has been labile during admission with high blood glucose at bedtime, now improved with fixed dinner aspart dosing. She is going to require close monitoring of her blood glucose levels upon discharge.     She should continue with the following insulin regimen upon discharge:   - Lantus 55 units every evening   - Sliding scale aspart before breakfast and lunch, at bedtime: Give 1 unit aspart for every 25 > 200   - Aspart 10 units with dinner   - Blood glucose checks TID before meals, at bedtime, and 0200   - Notify provider for BG <80 or >300   - She will need to follow-up with PCP or nursing home provider in 1 week to review blood glucose levels      Dolores Howard PA-C  Hospitalist Service  Pager: [phone]

## 2019-12-31 NOTE — PROGRESS NOTES
Phone call with Slime Stapleton from [facility]. She plans to complete the level 2 screening today.    Phone call with Rashawn to report that level 2 screening will be completed today. She requested signed orders be faxed over today if possible. There are no admission staff available tomorrow, so likely transfer on Thursday to [facility].    Voice message from patient's  Amber Price ([phone]) who requested update. Writer VALERI with update.    Phone call from Slime Nguyen reporting that patient's level 2 has been completed and sent to [facility]".

## 2019-12-31 NOTE — PLAN OF CARE
Pt is out in the milieu.Compliant to medications. Mood is calm, affect full range.  Denies SI,SIB,HI, nor wish to bed dead. Pt. Did not mention being depressed nor anxious.  Attends and participates in groups. BG was 81 at 7:20 and 118 at 12:20. Had 18 gms of carbs for breakfast, 315 at lunch( whopper burger 291 gm). Cozaar 50 mg po and Metoprolol 25 mg po given at 13:00 BP of 155/87,HR 84 .(BP standing did not meet parameter in AM) Was c/o dizziness after using her bathroom. Dolores Howard PA-C text paged regarding the above mentioned dizziness.    Pt encouraged fluid intake. Pt endorses feeling a bit better with her dizziness. Pt reeducated to move/change position slowly. Pt received  Senna 1 tab and Miralax.

## 2020-01-01 LAB
GLUCOSE BLDC GLUCOMTR-MCNC: 122 MG/DL (ref 70–99)
GLUCOSE BLDC GLUCOMTR-MCNC: 145 MG/DL (ref 70–99)
GLUCOSE BLDC GLUCOMTR-MCNC: 193 MG/DL (ref 70–99)
GLUCOSE BLDC GLUCOMTR-MCNC: 195 MG/DL (ref 70–99)
GLUCOSE BLDC GLUCOMTR-MCNC: 277 MG/DL (ref 70–99)

## 2020-01-01 PROCEDURE — G0177 OPPS/PHP; TRAIN & EDUC SERV: HCPCS

## 2020-01-01 PROCEDURE — 25000132 ZZH RX MED GY IP 250 OP 250 PS 637: Mod: GY | Performed by: NURSE PRACTITIONER

## 2020-01-01 PROCEDURE — 25000132 ZZH RX MED GY IP 250 OP 250 PS 637: Mod: GY | Performed by: PHYSICIAN ASSISTANT

## 2020-01-01 PROCEDURE — 00000146 ZZHCL STATISTIC GLUCOSE BY METER IP

## 2020-01-01 PROCEDURE — 12400002 ZZH R&B MH SENIOR/ADOLESCENT

## 2020-01-01 PROCEDURE — 25000132 ZZH RX MED GY IP 250 OP 250 PS 637: Mod: GY | Performed by: PSYCHIATRY & NEUROLOGY

## 2020-01-01 RX ADMIN — ASPIRIN 81 MG: 81 TABLET ORAL at 08:45

## 2020-01-01 RX ADMIN — Medication 10 MG: at 21:06

## 2020-01-01 RX ADMIN — ACETAMINOPHEN 650 MG: 325 TABLET, FILM COATED ORAL at 10:54

## 2020-01-01 RX ADMIN — TOPIRAMATE 50 MG: 50 TABLET ORAL at 21:06

## 2020-01-01 RX ADMIN — TRAZODONE HYDROCHLORIDE 50 MG: 50 TABLET ORAL at 21:12

## 2020-01-01 RX ADMIN — FAMOTIDINE 40 MG: 20 TABLET ORAL at 21:06

## 2020-01-01 RX ADMIN — CLONAZEPAM 0.5 MG: 0.5 TABLET ORAL at 21:06

## 2020-01-01 RX ADMIN — NYSTATIN: 100000 CREAM TOPICAL at 08:45

## 2020-01-01 RX ADMIN — AMANTADINE HYDROCHLORIDE 100 MG: 100 CAPSULE ORAL at 08:45

## 2020-01-01 RX ADMIN — GABAPENTIN 300 MG: 300 CAPSULE ORAL at 21:08

## 2020-01-01 RX ADMIN — SERTRALINE HYDROCHLORIDE 200 MG: 100 TABLET ORAL at 08:45

## 2020-01-01 RX ADMIN — METOPROLOL SUCCINATE 25 MG: 25 TABLET, EXTENDED RELEASE ORAL at 08:45

## 2020-01-01 RX ADMIN — ACETAMINOPHEN 650 MG: 325 TABLET, FILM COATED ORAL at 22:08

## 2020-01-01 RX ADMIN — LOSARTAN POTASSIUM 50 MG: 50 TABLET, FILM COATED ORAL at 08:45

## 2020-01-01 RX ADMIN — PALIPERIDONE 9 MG: 9 TABLET, EXTENDED RELEASE ORAL at 21:06

## 2020-01-01 RX ADMIN — AMANTADINE HYDROCHLORIDE 100 MG: 100 CAPSULE ORAL at 21:06

## 2020-01-01 RX ADMIN — ATORVASTATIN CALCIUM 80 MG: 80 TABLET, FILM COATED ORAL at 08:45

## 2020-01-01 ASSESSMENT — ACTIVITIES OF DAILY LIVING (ADL)
DRESS: INDEPENDENT
LAUNDRY: WITH SUPERVISION
DRESS: INDEPENDENT
HYGIENE/GROOMING: INDEPENDENT
HYGIENE/GROOMING: INDEPENDENT
LAUNDRY: WITH SUPERVISION
ORAL_HYGIENE: INDEPENDENT
ORAL_HYGIENE: INDEPENDENT

## 2020-01-01 NOTE — PLAN OF CARE
Problem: Adult Behavioral Health Plan of Care  Goal: Optimized Coping Skills in Response to Life Stressors  Outcome: Improving     Problem: Suicidal Behavior  Goal: Suicidal Behavior is Absent or Managed  Outcome: Improving    Pt is pleasant and cooperative.  Sits out in dayroom with peers.  Watches TV.  Several phone calls tonight.  Full range affect.  Denies psych symptoms.

## 2020-01-01 NOTE — PLAN OF CARE
Problem: OT General Care Plan  Goal: OT Goal 1  Description  Will attend OT groups improve concentration and comfort with engaging in more structured and success oriented options.   Note:   Attended 3 of 3 OT groups. With encouragement and structure and redirection to task, she worked for longer periods of time. She stated it difficult to focus for long periods of time. She participated in an activity focused on identifying helpful ideas for calming as well as alerting using the 5 senses, the vestibular and proprioceptive input with this focus.  She also participated in a group activity requiring concentration at a steady pace with familiar steps. She needed reminders and cues at times. Affect brightened some with this activity and appeared more alert and interested. She stated looking forward to discharge tomorrow.

## 2020-01-01 NOTE — PLAN OF CARE
Problem: Suicidal Behavior  Goal: Suicidal Behavior is Absent or Managed  Outcome: No Change     NURSING ASSESSMENT    MENTAL HEALTH  Pt denies SI/SIB/hallucinations/depression.  Pt endorses 4/10 anxiety on a 0-10 point scale, 0= zero anxiety and 10 = high anxiety.     Pt attended groups and won crossword puzzle books for a [employer] game. Full range of affect, mood is calm. Pt is pleasant and social with peers in the milieu.    Appetite = 24CHO breakfast & cranberry juice for lunch (see Flowsheet for CHO and fluid intake)    Sleep = 4.5hr, pt continues to endorse poor sleep. Encouraged pt to speak with provider about sleeping aid medications as pt does not like trazodone and melatonin is not helpful.    MEDICAL CONCERNS  BGL = 122 & 145       PRNS this shift  Tylenol 650mg for shoulder pain - pt reported some relief upon evaluation    VITAL SIGNS       01/01/20 0902   Vital Signs   Temp 98.6  F (37  C)   Temp src Tympanic   Resp 16   Pulse 82   Pulse/Heart Rate Source Monitor   /78   Standing Orthostatic BP   Standing Orthostatic /73   Standing Orthostatic Pulse 80 bpm   Oxygen Therapy   SpO2 96 %   Pain/Comfort   Patient Currently in Pain yes   Preferred Pain Scale CAPA (Group)   Pain Body Location - Side Bilateral   Pain Body Location - Orientation upper   Pain Body Location shoulder     RECOMMENDATION  Continue with plan of care. Encourage pt to speak with provider about sleep aid medications besides trazodone and melatonin.

## 2020-01-01 NOTE — PROGRESS NOTES
Calorie Count Assessment:   12/28: -Per Staff Note: Pt did not have a good appetite today. Pt did not eat any of her breakfast and only picked at her lunch for today             -Per Staff Note: Intake 870 ml. Carbs 48 g at dinner             -50% recorded at 1400   12/29: -Per Staff Note: Appetite = 50% lack of appetite for breakfast; 95% lunch             -25% recorded at 923             -95% recorded at 1307  12/30: -Per Staff Note: Pt tolerating diet             -Per Staff Note: Patient ate almost 100% of her dinner    3 day average PO intake: Unable to determine    RD contacted unit and requested staff tube over calorie counts from 12/28-12/30 - nothing sent as of this evening, so RD will assume calorie counts were not completed. Above information gathered from chart. In review of above, pt likely taking at least 50% of moderate consistent CHO diet. Unless nutrition support being considered, calorie count not likely appropriate/clinically indicated for pt who is likely taking at least 50% of meals.     Plan: RD will continue to monitor progress toward goals per protocol.    Jaida Turner RD, LD  [phone]

## 2020-01-02 VITALS
HEIGHT: 60 IN | BODY MASS INDEX: 46.12 KG/M2 | TEMPERATURE: 97.4 F | SYSTOLIC BLOOD PRESSURE: 130 MMHG | HEART RATE: 84 BPM | DIASTOLIC BLOOD PRESSURE: 78 MMHG | RESPIRATION RATE: 16 BRPM | WEIGHT: 234.9 LBS | OXYGEN SATURATION: 96 %

## 2020-01-02 LAB
GLUCOSE BLDC GLUCOMTR-MCNC: 106 MG/DL (ref 70–99)
GLUCOSE BLDC GLUCOMTR-MCNC: 188 MG/DL (ref 70–99)
GLUCOSE BLDC GLUCOMTR-MCNC: 206 MG/DL (ref 70–99)

## 2020-01-02 PROCEDURE — 25000132 ZZH RX MED GY IP 250 OP 250 PS 637: Mod: GY | Performed by: NURSE PRACTITIONER

## 2020-01-02 PROCEDURE — G0177 OPPS/PHP; TRAIN & EDUC SERV: HCPCS

## 2020-01-02 PROCEDURE — 99238 HOSP IP/OBS DSCHRG MGMT 30/<: CPT | Performed by: NURSE PRACTITIONER

## 2020-01-02 PROCEDURE — 25000132 ZZH RX MED GY IP 250 OP 250 PS 637: Mod: GY | Performed by: PSYCHIATRY & NEUROLOGY

## 2020-01-02 PROCEDURE — 25000132 ZZH RX MED GY IP 250 OP 250 PS 637: Mod: GY | Performed by: PHYSICIAN ASSISTANT

## 2020-01-02 PROCEDURE — 00000146 ZZHCL STATISTIC GLUCOSE BY METER IP

## 2020-01-02 RX ADMIN — METOPROLOL SUCCINATE 25 MG: 25 TABLET, EXTENDED RELEASE ORAL at 08:14

## 2020-01-02 RX ADMIN — SERTRALINE HYDROCHLORIDE 200 MG: 100 TABLET ORAL at 08:14

## 2020-01-02 RX ADMIN — AMANTADINE HYDROCHLORIDE 100 MG: 100 CAPSULE ORAL at 08:14

## 2020-01-02 RX ADMIN — LOSARTAN POTASSIUM 50 MG: 50 TABLET, FILM COATED ORAL at 08:13

## 2020-01-02 RX ADMIN — ASPIRIN 81 MG: 81 TABLET ORAL at 08:14

## 2020-01-02 RX ADMIN — ATORVASTATIN CALCIUM 80 MG: 80 TABLET, FILM COATED ORAL at 08:13

## 2020-01-02 ASSESSMENT — MIFFLIN-ST. JEOR: SCORE: 1567

## 2020-01-02 ASSESSMENT — ACTIVITIES OF DAILY LIVING (ADL)
DRESS: SCRUBS (BEHAVIORAL HEALTH);INDEPENDENT
HYGIENE/GROOMING: INDEPENDENT
LAUNDRY: WITH SUPERVISION
ORAL_HYGIENE: INDEPENDENT

## 2020-01-02 NOTE — DISCHARGE SUMMARY
"Psychiatric Discharge Summary    Nena Tang MRN# [id number]   Age: 59 year old YOB: 1960     Date of Admission:  12/20/2019  Date of Discharge:  1/2/2020  Admitting Provider:  Wade Siddiqi MD  Discharge Provider:  ART Linares CNP         Event Leading to Hospitalization:   Nena Tang is a 59 year old female with history of schizoaffective disorder, polysubstance abuse, type 2 diabetes, sleep apnea, and borderline intellectual functioning.  The patient went to the emergency room for evaluation of increased depression, and suicidal ideation.  The patient lives in a group home.  Reported having increasing thoughts of cutting or overdosing on insulin as a suicide attempt.  The patient reported that for several days, she has been taking extra insulin \"as a practice attempt\".  She reported having auditory hallucinations of seeing a man in in [place] and red eyes telling her to kill herself if she uses her CPAP machines.  She stopped using the CPAP.  She reported that the holidays are difficult for her as she lost multiple family members around this time of the year.  The patient is a poor historian.  She confirmed some of the information from the previous paragraph.  The patient stated that she tried to  harm herself by taking more insulin, because she wanted to feel bad.  The patient reported that she has been seeing a man with a black [place] and red eyes in her room in the evening telling her to harm herself if she is using the CPAP.  She has been having the hallucination for 3 years.  She believes it is the devil talking to her.  She also reports seeing other \"guys\" urging her to harm self.  She is also reporting auditory hallucinations of the same nature.  The patient was not able to answer a lot of questions.  States that she is depressed but was not able to come up with a specific problems she would like to work on.  States that that she is not sleeping at night because she is " afraid.  She is not taking naps during the day either.  She spends her day going to the drop-in center where she is up until 4 PM.  The patient states that her energy is low.  Still has suicidal thoughts but feels safe on the unit.  She was not able to recall current or past history of  anxiety, panic attacks, or manic symptoms.  Denies PTSD.  Denies OCD, and eating disorders.  Does not remember being diagnosed with borderline personality disorder.  Reports 4 suicide attempts by cutting, overdosing on medications, drugs and alcohol, and jumping out of a window.  Denies self injury behaviors.Denies seizures, head injuries, and loss of consciousness.      See Admission note by ART Roldan, CNP, on 12/21/19 for additional details.          DIagnoses:   1.  Schizoaffective disorder, bipolar type, currently depressed, with psychosis  2.  Borderline intellectual functioning  3.  Polysubstance abuse  4.  Medical problems include morbid obesity, type 2 diabetes, sleep apnea, multiple pain issues.         Labs:     Recent Results (from the past 504 hour(s))   Glucose by meter    Collection Time: 12/20/19  4:04 PM   Result Value Ref Range    Glucose 219 (H) 70 - 99 mg/dL   Drug abuse screen 6 urine (chem dep)    Collection Time: 12/20/19  4:19 PM   Result Value Ref Range    Amphetamine Qual Urine Negative NEG^Negative    Barbiturates Qual Urine Negative NEG^Negative    Benzodiazepine Qual Urine Negative NEG^Negative    Cannabinoids Qual Urine Negative NEG^Negative    Cocaine Qual Urine Negative NEG^Negative    Ethanol Qual Urine Negative NEG^Negative    Opiates Qualitative Urine Negative NEG^Negative   Glucose by meter    Collection Time: 12/20/19  8:56 PM   Result Value Ref Range    Glucose 110 (H) 70 - 99 mg/dL   Glucose by meter    Collection Time: 12/20/19 11:13 PM   Result Value Ref Range    Glucose 190 (H) 70 - 99 mg/dL   Glucose by meter    Collection Time: 12/21/19  2:05 AM   Result Value Ref Range     Glucose 177 (H) 70 - 99 mg/dL   Glucose by meter    Collection Time: 12/21/19  7:45 AM   Result Value Ref Range    Glucose 148 (H) 70 - 99 mg/dL   Glucose by meter    Collection Time: 12/21/19 11:09 AM   Result Value Ref Range    Glucose 134 (H) 70 - 99 mg/dL   Glucose by meter    Collection Time: 12/21/19 12:26 PM   Result Value Ref Range    Glucose 123 (H) 70 - 99 mg/dL   Glucose by meter    Collection Time: 12/21/19  5:13 PM   Result Value Ref Range    Glucose 130 (H) 70 - 99 mg/dL   Glucose by meter    Collection Time: 12/21/19  9:57 PM   Result Value Ref Range    Glucose 158 (H) 70 - 99 mg/dL   Glucose by meter    Collection Time: 12/22/19  1:47 AM   Result Value Ref Range    Glucose 110 (H) 70 - 99 mg/dL   CBC with platelets differential    Collection Time: 12/22/19  6:51 AM   Result Value Ref Range    WBC 7.5 4.0 - 11.0 10e9/L    RBC Count 3.57 (L) 3.8 - 5.2 10e12/L    Hemoglobin 10.8 (L) 11.7 - 15.7 g/dL    Hematocrit 34.9 (L) 35.0 - 47.0 %    MCV 98 78 - 100 fl    MCH 30.3 26.5 - 33.0 pg    MCHC 30.9 (L) 31.5 - 36.5 g/dL    RDW 13.6 10.0 - 15.0 %    Platelet Count 221 150 - 450 10e9/L    Diff Method Automated Method     % Neutrophils 49.9 %    % Lymphocytes 40.2 %    % Monocytes 8.0 %    % Eosinophils 1.2 %    % Basophils 0.3 %    % Immature Granulocytes 0.4 %    Nucleated RBCs 0 0 /100    Absolute Neutrophil 3.8 1.6 - 8.3 10e9/L    Absolute Lymphocytes 3.0 0.8 - 5.3 10e9/L    Absolute Monocytes 0.6 0.0 - 1.3 10e9/L    Absolute Eosinophils 0.1 0.0 - 0.7 10e9/L    Absolute Basophils 0.0 0.0 - 0.2 10e9/L    Abs Immature Granulocytes 0.0 0 - 0.4 10e9/L    Absolute Nucleated RBC 0.0    Comprehensive metabolic panel    Collection Time: 12/22/19  6:51 AM   Result Value Ref Range    Sodium 141 133 - 144 mmol/L    Potassium 4.1 3.4 - 5.3 mmol/L    Chloride 111 (H) 94 - 109 mmol/L    Carbon Dioxide 22 20 - 32 mmol/L    Anion Gap 8 3 - 14 mmol/L    Glucose 111 (H) 70 - 99 mg/dL    Urea Nitrogen 16 7 - 30 mg/dL     Creatinine 0.86 0.52 - 1.04 mg/dL    GFR Estimate 74 >60 mL/min/[1.73_m2]    GFR Estimate If Black 85 >60 mL/min/[1.73_m2]    Calcium 8.7 8.5 - 10.1 mg/dL    Bilirubin Total 0.2 0.2 - 1.3 mg/dL    Albumin 3.2 (L) 3.4 - 5.0 g/dL    Protein Total 7.5 6.8 - 8.8 g/dL    Alkaline Phosphatase 88 40 - 150 U/L    ALT 22 0 - 50 U/L    AST 8 0 - 45 U/L   Lipid panel    Collection Time: 12/22/19  6:51 AM   Result Value Ref Range    Cholesterol 154 <200 mg/dL    Triglycerides 132 <150 mg/dL    HDL Cholesterol 42 (L) >49 mg/dL    LDL Cholesterol Calculated 86 <100 mg/dL    Non HDL Cholesterol 112 <130 mg/dL   TSH with free T4 reflex and/or T3 as indicated    Collection Time: 12/22/19  6:51 AM   Result Value Ref Range    TSH 1.23 0.40 - 4.00 mU/L   Folate    Collection Time: 12/22/19  6:51 AM   Result Value Ref Range    Folate 8.5 >5.4 ng/mL   Vitamin B12    Collection Time: 12/22/19  6:51 AM   Result Value Ref Range    Vitamin B12 358 193 - 986 pg/mL   Vitamin D    Collection Time: 12/22/19  6:51 AM   Result Value Ref Range    Vitamin D Deficiency screening 39 20 - 75 ug/L   Bilirubin direct    Collection Time: 12/22/19  6:51 AM   Result Value Ref Range    Bilirubin Direct <0.1 0.0 - 0.2 mg/dL   Glucose by meter    Collection Time: 12/22/19 12:17 PM   Result Value Ref Range    Glucose 238 (H) 70 - 99 mg/dL   UA with Microscopic reflex to Culture    Collection Time: 12/22/19  1:15 PM   Result Value Ref Range    Color Urine Yellow     Appearance Urine Clear     Glucose Urine 300 (A) NEG^Negative mg/dL    Bilirubin Urine Negative NEG^Negative    Ketones Urine Negative NEG^Negative mg/dL    Specific Gravity Urine 1.018 1.003 - 1.035    Blood Urine Negative NEG^Negative    pH Urine 6.0 5.0 - 7.0 pH    Protein Albumin Urine Negative NEG^Negative mg/dL    Urobilinogen mg/dL Normal 0.0 - 2.0 mg/dL    Nitrite Urine Negative NEG^Negative    Leukocyte Esterase Urine Negative NEG^Negative    Source Midstream Urine     WBC Urine <1 0 - 5  /HPF    RBC Urine 0 0 - 2 /HPF    Bacteria Urine Few (A) NEG^Negative /HPF    Squamous Epithelial /HPF Urine 1 0 - 1 /HPF   Glucose by meter    Collection Time: 12/22/19  4:32 PM   Result Value Ref Range    Glucose 224 (H) 70 - 99 mg/dL   Glucose by meter    Collection Time: 12/22/19  9:07 PM   Result Value Ref Range    Glucose 204 (H) 70 - 99 mg/dL   Glucose by meter    Collection Time: 12/23/19  2:01 AM   Result Value Ref Range    Glucose 197 (H) 70 - 99 mg/dL   Glucose by meter    Collection Time: 12/23/19  7:57 AM   Result Value Ref Range    Glucose 132 (H) 70 - 99 mg/dL   Glucose by meter    Collection Time: 12/23/19 11:40 AM   Result Value Ref Range    Glucose 194 (H) 70 - 99 mg/dL   Glucose by meter    Collection Time: 12/23/19  4:43 PM   Result Value Ref Range    Glucose 151 (H) 70 - 99 mg/dL   Glucose by meter    Collection Time: 12/23/19  8:04 PM   Result Value Ref Range    Glucose 301 (H) 70 - 99 mg/dL   Glucose by meter    Collection Time: 12/23/19  9:12 PM   Result Value Ref Range    Glucose 286 (H) 70 - 99 mg/dL   Glucose by meter    Collection Time: 12/24/19  1:48 AM   Result Value Ref Range    Glucose 164 (H) 70 - 99 mg/dL   Glucose by meter    Collection Time: 12/24/19  8:39 AM   Result Value Ref Range    Glucose 101 (H) 70 - 99 mg/dL   Glucose by meter    Collection Time: 12/24/19 12:00 PM   Result Value Ref Range    Glucose 155 (H) 70 - 99 mg/dL   Glucose by meter    Collection Time: 12/24/19  5:11 PM   Result Value Ref Range    Glucose 144 (H) 70 - 99 mg/dL   Glucose by meter    Collection Time: 12/24/19  8:48 PM   Result Value Ref Range    Glucose 239 (H) 70 - 99 mg/dL   Glucose by meter    Collection Time: 12/25/19  2:05 AM   Result Value Ref Range    Glucose 147 (H) 70 - 99 mg/dL   Glucose by meter    Collection Time: 12/25/19  7:50 AM   Result Value Ref Range    Glucose 111 (H) 70 - 99 mg/dL   Glucose by meter    Collection Time: 12/25/19 12:14 PM   Result Value Ref Range    Glucose 199 (H)  70 - 99 mg/dL   Glucose by meter    Collection Time: 12/25/19  4:46 PM   Result Value Ref Range    Glucose 352 (H) 70 - 99 mg/dL   Glucose by meter    Collection Time: 12/25/19  9:04 PM   Result Value Ref Range    Glucose 338 (H) 70 - 99 mg/dL   Glucose by meter    Collection Time: 12/25/19  9:47 PM   Result Value Ref Range    Glucose 355 (H) 70 - 99 mg/dL   Glucose by meter    Collection Time: 12/26/19  1:49 AM   Result Value Ref Range    Glucose 194 (H) 70 - 99 mg/dL   Glucose by meter    Collection Time: 12/26/19  8:01 AM   Result Value Ref Range    Glucose 139 (H) 70 - 99 mg/dL   Glucose by meter    Collection Time: 12/26/19 12:11 PM   Result Value Ref Range    Glucose 223 (H) 70 - 99 mg/dL   Glucose by meter    Collection Time: 12/26/19  5:02 PM   Result Value Ref Range    Glucose 206 (H) 70 - 99 mg/dL   Glucose by meter    Collection Time: 12/26/19  8:11 PM   Result Value Ref Range    Glucose 337 (H) 70 - 99 mg/dL   Glucose by meter    Collection Time: 12/27/19  1:30 AM   Result Value Ref Range    Glucose 229 (H) 70 - 99 mg/dL   Glucose by meter    Collection Time: 12/27/19  7:57 AM   Result Value Ref Range    Glucose 165 (H) 70 - 99 mg/dL   Glucose by meter    Collection Time: 12/27/19 12:07 PM   Result Value Ref Range    Glucose 270 (H) 70 - 99 mg/dL   Glucose by meter    Collection Time: 12/27/19  5:17 PM   Result Value Ref Range    Glucose 232 (H) 70 - 99 mg/dL   Glucose by meter    Collection Time: 12/27/19  8:53 PM   Result Value Ref Range    Glucose 427 (H) 70 - 99 mg/dL   Glucose by meter    Collection Time: 12/27/19  9:06 PM   Result Value Ref Range    Glucose 428 (H) 70 - 99 mg/dL   Glucose by meter    Collection Time: 12/28/19  1:48 AM   Result Value Ref Range    Glucose 179 (H) 70 - 99 mg/dL   Glucose by meter    Collection Time: 12/28/19  8:06 AM   Result Value Ref Range    Glucose 119 (H) 70 - 99 mg/dL   Glucose by meter    Collection Time: 12/28/19 11:53 AM   Result Value Ref Range    Glucose  119 (H) 70 - 99 mg/dL   Glucose by meter    Collection Time: 12/28/19  4:51 PM   Result Value Ref Range    Glucose 230 (H) 70 - 99 mg/dL   Glucose by meter    Collection Time: 12/28/19  9:05 PM   Result Value Ref Range    Glucose 349 (H) 70 - 99 mg/dL   Glucose by meter    Collection Time: 12/29/19  1:52 AM   Result Value Ref Range    Glucose 154 (H) 70 - 99 mg/dL   Glucose by meter    Collection Time: 12/29/19  8:37 AM   Result Value Ref Range    Glucose 96 70 - 99 mg/dL   Glucose by meter    Collection Time: 12/29/19  9:17 AM   Result Value Ref Range    Glucose 165 (H) 70 - 99 mg/dL   Glucose by meter    Collection Time: 12/29/19 11:49 AM   Result Value Ref Range    Glucose 244 (H) 70 - 99 mg/dL   Glucose by meter    Collection Time: 12/29/19  3:56 PM   Result Value Ref Range    Glucose 232 (H) 70 - 99 mg/dL   Glucose by meter    Collection Time: 12/29/19  8:33 PM   Result Value Ref Range    Glucose 316 (H) 70 - 99 mg/dL   Glucose by meter    Collection Time: 12/30/19  1:45 AM   Result Value Ref Range    Glucose 203 (H) 70 - 99 mg/dL   Glucose by meter    Collection Time: 12/30/19  8:12 AM   Result Value Ref Range    Glucose 98 70 - 99 mg/dL   Glucose by meter    Collection Time: 12/30/19 11:24 AM   Result Value Ref Range    Glucose 205 (H) 70 - 99 mg/dL   Glucose by meter    Collection Time: 12/30/19 12:06 PM   Result Value Ref Range    Glucose 194 (H) 70 - 99 mg/dL   Glucose by meter    Collection Time: 12/30/19  4:45 PM   Result Value Ref Range    Glucose 216 (H) 70 - 99 mg/dL   Glucose by meter    Collection Time: 12/30/19  9:17 PM   Result Value Ref Range    Glucose 239 (H) 70 - 99 mg/dL   Glucose by meter    Collection Time: 12/31/19  1:40 AM   Result Value Ref Range    Glucose 138 (H) 70 - 99 mg/dL   Glucose by meter    Collection Time: 12/31/19  8:01 AM   Result Value Ref Range    Glucose 81 70 - 99 mg/dL   Glucose by meter    Collection Time: 12/31/19 11:08 AM   Result Value Ref Range    Glucose 154 (H)  70 - 99 mg/dL   Glucose by meter    Collection Time: 12/31/19 12:33 PM   Result Value Ref Range    Glucose 118 (H) 70 - 99 mg/dL   Glucose by meter    Collection Time: 12/31/19  5:10 PM   Result Value Ref Range    Glucose 247 (H) 70 - 99 mg/dL   Glucose by meter    Collection Time: 12/31/19  9:19 PM   Result Value Ref Range    Glucose 231 (H) 70 - 99 mg/dL   Glucose by meter    Collection Time: 01/01/20  1:38 AM   Result Value Ref Range    Glucose 193 (H) 70 - 99 mg/dL   Glucose by meter    Collection Time: 01/01/20  8:31 AM   Result Value Ref Range    Glucose 122 (H) 70 - 99 mg/dL   Glucose by meter    Collection Time: 01/01/20 12:07 PM   Result Value Ref Range    Glucose 145 (H) 70 - 99 mg/dL   Glucose by meter    Collection Time: 01/01/20  5:08 PM   Result Value Ref Range    Glucose 277 (H) 70 - 99 mg/dL   Glucose by meter    Collection Time: 01/01/20  8:22 PM   Result Value Ref Range    Glucose 195 (H) 70 - 99 mg/dL   Glucose by meter    Collection Time: 01/02/20  1:51 AM   Result Value Ref Range    Glucose 188 (H) 70 - 99 mg/dL   Glucose by meter    Collection Time: 01/02/20  8:14 AM   Result Value Ref Range    Glucose 106 (H) 70 - 99 mg/dL   Glucose by meter    Collection Time: 01/02/20 11:23 AM   Result Value Ref Range    Glucose 206 (H) 70 - 99 mg/dL            Consults:   Consultation during this admission received from internal medicine         Hospital Course:   Nena Tang was admitted to [address] with attending Dede CORDOVA CNP, as a voluntary patient. The patient was placed under status 15 (15 minute checks) to ensure patient safety.     The following medication changes took place:  --Discontinue Cogentin.    --Start Amantadine 100 mg twice a day.   --Cancel the day time dose of Gabapentin due to sedation. Continue 300 mg, at bedtime.    --Change Invega to 9 mg at bedtime.    --Discontinue the morning dose of Topamax.    --Continue Topamax 50 mg at bedtime.   --Continue  "Klonopin 0.5 mg at bedtime.    --Continue Melatonin 10 mg at bedtime.    --Discontinue naltrexone, the patient has been sober for 27 years.    --Continue Zoloft 200 mg every morning.       The patient tolerated medications well. Reported mood symptoms improved. The patient was active on the unit. The patient was social, engaged and attended groups. No overt jennifer, confusion or psychosis noted. The patient maintained denial of SI, HI and BREANNE. The patient rates depression and anxiety as minimal. Still has low energy. The psychosis and SI resolved completely. Future oriented, feeling hopeful for the future. The patient slept well. Appetite was intact. The patient was compliant with medications and care.     Due to her alleged suicide attempt, the patient was not able to return to her group home where she resided in the last 3 years. She was discharge to a nursing home, which she doesn't like but is accepting. Her main concern is \"living with old people\".  The patient will work with her community CM to find another group home.      Nena Tang was released to home. At the time of this encounter, Nena Tang was determined to not be a danger to herself or others and symptoms did not meet criteria for involuntary hospitalization.      Safety plan, post discharge recommendations and relapse prevention were discussed with the patient. The patient agreed to call 911 or present to ED if symptoms worsen or developed thoughts of suicide, self harm or homicide.  The patient agreed to continue medications and outpatient care.         Discharge Medications:     Current Discharge Medication List      START taking these medications    Details   amantadine (SYMMETREL) 100 MG capsule Take 1 capsule (100 mg) by mouth 2 times daily    Associated Diagnoses: Schizoaffective disorder, depressive type (H)      hydrOXYzine (ATARAX) 25 MG tablet Take 1 tablet (25 mg) by mouth every 4 hours as needed for anxiety    Associated " Diagnoses: Schizoaffective disorder, depressive type (H)      loperamide (IMODIUM) 2 MG capsule Take 1 capsule (2 mg) by mouth 4 times daily as needed for diarrhea    Associated Diagnoses: Diarrhea, unspecified type      miconazole (MICATIN/MICRO GUARD) 2 % external powder Apply topically once as needed    Associated Diagnoses: Fungal infection      polyethylene glycol (MIRALAX/GLYCOLAX) packet Take 17 g by mouth daily as needed for constipation    Associated Diagnoses: Constipation, unspecified constipation type      traZODone (DESYREL) 50 MG tablet Take 1 tablet (50 mg) by mouth nightly as needed for sleep    Associated Diagnoses: Schizoaffective disorder, depressive type (H)         CONTINUE these medications which have CHANGED    Details   clonazePAM (KLONOPIN) 0.5 MG tablet Take 1 tablet (0.5 mg) by mouth At Bedtime  Qty: 30 tablet, Refills: 0    Associated Diagnoses: Schizoaffective disorder, bipolar type (H)      dextromethorphan-guaiFENesin (MUCINEX DM)  MG 12 hr tablet Take 1 tablet by mouth 2 times daily as needed for cough    Associated Diagnoses: Cough      diclofenac (VOLTAREN) 1 % topical gel Place 4 g onto the skin 4 times daily as needed for moderate pain    Associated Diagnoses: Chronic right-sided low back pain without sciatica      gabapentin (NEURONTIN) 300 MG capsule Take 1 capsule (300 mg) by mouth At Bedtime    Associated Diagnoses: Chronic right-sided low back pain without sciatica; Schizoaffective disorder, depressive type (H)      ibuprofen (ADVIL/MOTRIN) 200 MG tablet Take 1 tablet (200 mg) by mouth every 8 hours as needed for mild pain    Associated Diagnoses: Chronic right-sided low back pain without sciatica      !! insulin aspart (NOVOLOG PEN) 100 UNIT/ML pen Inject 10 Units Subcutaneous daily (with dinner)    Associated Diagnoses: Type 2 diabetes mellitus with mild nonproliferative retinopathy without macular edema, with long-term current use of insulin, unspecified laterality  (H)      !! insulin aspart (NOVOLOG PEN) 100 UNIT/ML pen Inject 1-7 Units Subcutaneous At Bedtime HIGH INSULIN RESISTANCE DOSING    Do Not give Bedtime Correction Insulin if BG less than 200.   For  - 224 give 1 units.   For  - 249 give 2 units.   For  - 274 give 3 units.   For  - 299 give 4 units.   For  - 324 give 5 units.   For  - 349 give 6 units.   For BG greater than or equal to 350 give 7 units.   Notify provider if glucose greater than or equal to 350 mg/dL after administration of correction dose.    Associated Diagnoses: Type 2 diabetes mellitus with mild nonproliferative retinopathy without macular edema, with long-term current use of insulin, unspecified laterality (H)      !! insulin aspart (NOVOLOG PEN) 100 UNIT/ML pen Inject 1-10 Units Subcutaneous 2 times daily (before meals) GIVE BEFORE BREAKFAST AND LUNCH  Do Not give Correction Insulin if Pre-Meal BG less than 140.   For Pre-Meal  - 164 give 1 unit.   For Pre-Meal  - 189 give 2 units.   For Pre-Meal  - 214 give 3 units.   For Pre-Meal  - 239 give 4 units.   For Pre-Meal  - 264 give 5 units.   For Pre-Meal  - 289 give 6 units.   For Pre-Meal  - 314 give 7 units.   For Pre-Meal  - 339 give 8 units.   For Pre-Meal  - 364 give 9 units.   For Pre-Meal BG greater than or equal to 365 give 10 units  To be given with prandial insulin, and based on pre-meal blood glucose.   Notify provider if glucose greater than or equal to 350 mg/dL after administration of correction dose.    Associated Diagnoses: Type 2 diabetes mellitus with mild nonproliferative retinopathy without macular edema, with long-term current use of insulin, unspecified laterality (H)      insulin glargine (LANTUS PEN) 100 UNIT/ML pen Inject 55 Units Subcutaneous At Bedtime    Comments: If Lantus is not covered by insurance, may substitute Basaglar at same dose and frequency.    Associated Diagnoses:  Type 2 diabetes mellitus with mild nonproliferative retinopathy without macular edema, with long-term current use of insulin, unspecified laterality (H)      ipratropium - albuterol 0.5 mg/2.5 mg/3 mL (DUONEB) 0.5-2.5 (3) MG/3ML neb solution Take 1 vial (3 mLs) by nebulization every 6 hours as needed for shortness of breath / dyspnea or wheezing  Qty: 30 vial, Refills: 0    Associated Diagnoses: Wheezing      nystatin (MYCOSTATIN) 695540 UNIT/GM external cream Apply topically 2 times daily  Qty: 30 g, Refills: 3    Associated Diagnoses: Rash and nonspecific skin eruption      paliperidone ER (INVEGA) 9 MG 24 hr tablet Take 1 tablet (9 mg) by mouth At Bedtime    Associated Diagnoses: Schizoaffective disorder, depressive type (H)      topiramate (TOPAMAX) 50 MG tablet Take 1 tablet (50 mg) by mouth At Bedtime    Associated Diagnoses: Schizoaffective disorder, depressive type (H)      zinc oxide (DESITIN) 40 % external ointment Apply topically as needed for dry skin or irritation  Qty: 56 g, Refills: 0    Associated Diagnoses: Cellulitis, gluteal       !! - Potential duplicate medications found. Please discuss with provider.      CONTINUE these medications which have NOT CHANGED    Details   aspirin (ASA) 81 MG EC tablet TAKE 1 TABLET (81MG) BY MOUTH DAILY  Qty: 90 tablet, Refills: 0    Associated Diagnoses: Coronary artery disease involving native heart with angina pectoris, unspecified vessel or lesion type (H)      atorvastatin (LIPITOR) 80 MG tablet TAKE 1 TABLET (80MG) BY MOUTH DAILY  Qty: 30 tablet, Refills: 11    Associated Diagnoses: Hyperlipidemia LDL goal <100      losartan (COZAAR) 50 MG tablet Take 1 tablet (50 mg) by mouth daily  Qty: 90 tablet, Refills: 3    Comments: Dose decrease  Associated Diagnoses: Coronary artery disease involving native heart with angina pectoris, unspecified vessel or lesion type (H)      metoprolol succinate ER (TOPROL-XL) 25 MG 24 hr tablet Take 1 tablet (25 mg) by mouth  daily  Qty: 90 tablet, Refills: 1    Associated Diagnoses: Coronary artery disease involving native heart with angina pectoris, unspecified vessel or lesion type (H)      ranitidine (ZANTAC) 300 MG tablet Take 300 mg by mouth At Bedtime      sertraline (ZOLOFT) 100 MG tablet Take 2 tablets (200 mg) by mouth daily  Qty: 60 tablet, Refills: 3    Associated Diagnoses: Schizoaffective disorder, bipolar type (H)      blood glucose (NO BRAND SPECIFIED) test strip Use to test blood sugar  3 times daily or as directed. To accompany: Blood Glucose Monitor Brands: per insurance.  Qty: 100 strip, Refills: 0    Associated Diagnoses: Type 2 diabetes mellitus with mild nonproliferative retinopathy without macular edema, with long-term current use of insulin, unspecified laterality (H)      Continuous Blood Gluc Sensor (DEXCOM G5 MOB/G4 PLAT SENSOR) MISC 1 Device every 7 days      Continuous Blood Gluc Transmit (DEXCOM G5 MOBILE TRANSMITTER) MISC 1 Device every 3 months      insulin pen needle (NOVOFINE 30) 30G X 8 MM miscellaneous USE 4 DAILY OR AS DIRECTED  Qty: 400 each, Refills: 0    Associated Diagnoses: Type 2 diabetes mellitus with mild nonproliferative retinopathy without macular edema, with long-term current use of insulin, unspecified laterality (H)      melatonin 5 MG CAPS Take 2 capsules by mouth At Bedtime  Qty: 180 capsule, Refills: 3    Comments: Updated instructions  Associated Diagnoses: Insomnia, unspecified type      !! order for DME Equipment being ordered: Depends.  Qty: 30 each, Refills: 1    Associated Diagnoses: Mixed incontinence      !! order for DME Equipment being ordered: CPAP Supplies.  Qty: 1 each, Refills: 0    Associated Diagnoses: CINDY (obstructive sleep apnea)      !! order for DME Equipment being ordered: Freestyle Gabby Warwick  Qty: 1 Device, Refills: 0    Associated Diagnoses: Type 2 diabetes mellitus with stage 3 chronic kidney disease, with long-term current use of insulin (H)       prochlorperazine (COMPAZINE) 5 MG tablet Take 5 mg by mouth every 6 hours as needed for nausea or vomiting      senna-docusate (SENOKOT-S/PERICOLACE) 8.6-50 MG tablet Take 1 tablet by mouth 2 times daily as needed for constipation      SUMAtriptan (IMITREX) 25 MG tablet Take 25 mg by mouth at onset of headache for migraine       !! - Potential duplicate medications found. Please discuss with provider.      STOP taking these medications       albuterol (PROAIR HFA/PROVENTIL HFA/VENTOLIN HFA) 108 (90 Base) MCG/ACT inhaler Comments:   Reason for Stopping:         benzonatate (TESSALON) 100 MG capsule Comments:   Reason for Stopping:         benztropine (COGENTIN) 0.5 MG tablet Comments:   Reason for Stopping:         calcium carbonate (OS-ALLEN) 1500 (600 Ca) MG tablet Comments:   Reason for Stopping:         cholecalciferol (VITAMIN D3) 11472 units (1250 mcg) capsule Comments:   Reason for Stopping:         guaiFENesin (ROBITUSSIN) 100 MG/5ML liquid Comments:   Reason for Stopping:         nystatin (MYCOSTATIN) 535395 UNIT/GM external powder Comments:   Reason for Stopping:         polyethylene glycol (MIRALAX) powder Comments:   Reason for Stopping:         semaglutide (OZEMPIC, 1 MG/DOSE,) 2 MG/1.5ML pen Comments:   Reason for Stopping:                    Psychiatric and Physical Examinations:   Appearance:  well groomed, awake, alert, cooperative, no apparent distress and moderately obese  Attitude:  cooperative  Eye Contact:  good  Mood:  good  Affect:  appropriate and in normal range  Speech:  clear, coherent  Psychomotor Behavior:  no evidence of tardive dyskinesia, dystonia, or tics  Thought Process:  logical  Associations:  no loose associations  Thought Content:  no evidence of suicidal ideation or homicidal ideation  Insight:  good  Judgment:  intact  Oriented to:  time, person, and place  Attention Span and Concentration:  intact  Recent and Remote Memory:  intact  Language and Fund of Knowledge:  appropriate  Muscle Strength and Tone: normal  Gait and Station: walks with a walker.   Vitals:    12/31/19 1600 01/01/20 0902 01/01/20 1624 01/02/20 0834   BP: (!) 142/84 120/78 130/78    Pulse: 84 82 84    Resp: 16 16     Temp: 97.2  F (36.2  C) 98.6  F (37  C) 97.4  F (36.3  C)    TempSrc: Tympanic Tympanic Tympanic    SpO2: 99% 96%     Weight:    106.5 kg (234 lb 14.4 oz)   Height:                Discharge Plan:     Follow up Appointment:  PCP appointment:  Tuesday, January 14 at 4:00 pm  [address]:  [address]  [place]  [address]  Phone:  [phone]     Mental Health :  Amber Price at  [phone]     CAD  - Destini Howard  [phone] with Community Involvement Program     Attestation:  The patient has been seen and evaluated by me,  Dede CORDOVA, CNP

## 2020-01-02 NOTE — PROGRESS NOTES
DISCHARGE NOTE    Pt discharged to [facility] at 1300 with referral to    Health Care Follow-up Appointments:   PCP appointment:  Tuesday, January 14 at 4:00 pm  Provider:  Rowena Bejarano [address] [place], [place] [address], [address]  [place]  [address]  Phone:  [phone]     Mental Health :  Amber Price at  [phone]     CAD  - Destini Howard  [phone] with Community Involvement Program      All belongings returned to pt, including locker contents; no items sent to security. No discharge medications given to pt - orders sent to [facility]. Pt denies any MH sx at this time, including SI, SIB, and HI.    Pt escorted off unit by staff Nan. Pt picked up by [facility] to transport to [facility].

## 2020-01-02 NOTE — DISCHARGE INSTRUCTIONS
Behavioral Discharge Planning and Instructions      Summary:  You were admitted on 12/20/2019  due to Suicidal Ideations and Self Injurious Behaviors.  You were treated by Dr. Wade Siddiqi MD and discharged on 1/2/2020 from [facility] to Skilled Nursing Facility.    [facility]  [address] [place] [address]  Phone: [phone]    Principal Diagnosis:   Schizoaffective Disorder    Health Care Follow-up Appointments:   PCP appointment:  Tuesday, January 14 at 4:00 pm  Provider:  Rowena Bejarano [address]  [place]  [address]  Phone:  [phone]    Mental Health :  Amber Price at  [phone]    CAD  - Destini Howard  [phone] with Community Involvement Program     Attend all scheduled appointments with your outpatient providers. Call at least 24 hours in advance if you need to reschedule an appointment to ensure continued access to your outpatient providers.   Major Treatments, Procedures and Findings:  You were provided with: a psychiatric assessment and medication evaluation and/or management    Symptoms to Report: feeling more aggressive, increased confusion, losing more sleep, mood getting worse or thoughts of suicide    Early warning signs can include: increased depression or anxiety sleep disturbances increased thoughts or behaviors of suicide or self-harm  increased unusual thinking, such as paranoia or hearing voices    Safety and Wellness:  Take all medicines as directed.  Make no changes unless your doctor suggests them.  Follow treatment recommendations.  Refrain from alcohol and non-prescribed drugs.  If there is a concern for safety, call 911.    Resources:   Crisis Intervention: 523.999.2045 or 949-389-3758 (TTY: 874.724.9260).  Call anytime for help.  National [place] on Mental Illness (www.[place].marah.org): 159.919.1070 or 868-197-4305.  National Suicide Prevention Line (www.mentalhealthmn.org): 176-133-THMN  "(4371)  Mental Health Consumer/Survivor Network of [place] (www.mhcsn.net): 128-258-2802 or 252-514-9856  [facility] Crisis (COPE) Response - Adult [phone]  Text 4 Life: txt \"LIFE\" to 23701 for immediate support and crisis intervention      The treatment team has appreciated the opportunity to work with you.     If you have any questions or concerns our unit number is [phone].          "

## 2020-01-02 NOTE — PROGRESS NOTES
Phone call with [facility]awn who reported they did receive the [facility] 2 and patient can transfer to the facility today. They will need signed orders.    Signed orders were faxed to Allie at [phone]. Patient will discharge at 1:00 pm

## 2020-01-02 NOTE — PROGRESS NOTES
Attended the am OT group prior to discharge. She was more social, initiated comments to a peer with a creative topic of interest. She elaborated more on conversation and offered support to that peer. She worked for longer periods of time. She needed encouragement and cues though was quicker in following through on one step task work. Affect brightened some with interactions.

## 2020-01-02 NOTE — PLAN OF CARE
Problem: Adult Behavioral Health Plan of Care  Goal: Optimized Coping Skills in Response to Life Stressors  Outcome: Improving  Goal: Develops/Participates in Therapeutic [place] to Support Successful Transition  Outcome: Improving   Pt is doing well.  She had a couple of visitors this evening.  She is pleasant and social.  She ate very little dinner (half a salad) after the Novolog 10 had been administered.  She did eat the rest of her [employer] sandwich from earlier in the day at snack time.  .  She is complaining of not sleeping well.  Stated the Melatonin is not helping.  She looked very tired this evening.  Trazodone administered.  Tylenol for chronic shoulder pain.

## 2020-01-06 DIAGNOSIS — E11.3299 TYPE 2 DIABETES MELLITUS WITH MILD NONPROLIFERATIVE RETINOPATHY WITHOUT MACULAR EDEMA, WITH LONG-TERM CURRENT USE OF INSULIN, UNSPECIFIED LATERALITY (H): ICD-10-CM

## 2020-01-06 DIAGNOSIS — Z79.4 TYPE 2 DIABETES MELLITUS WITH MILD NONPROLIFERATIVE RETINOPATHY WITHOUT MACULAR EDEMA, WITH LONG-TERM CURRENT USE OF INSULIN, UNSPECIFIED LATERALITY (H): ICD-10-CM

## 2020-01-14 ENCOUNTER — DOCUMENTATION ONLY (OUTPATIENT)
Dept: FAMILY MEDICINE | Facility: CLINIC | Age: 60
End: 2020-01-14

## 2020-01-14 NOTE — PROGRESS NOTES
Patient called to cancel her appointment for today. Reached out to patient: patient states that she is currently at [facility]. Had a care conference today. Patient states that she will be getting OT/PT and has also been seeing a therapist. Is scheduled to see a psychiatrist in the near future.   Patient has a medical team there and will continue with them for now.   Patient states that she has a  that is working to get her different housing. Encouraged patient to call and schedule an appointment here at the clinic once she has different placement for housing.     ART Oliver [facility]IMARY [facility]

## 2020-02-10 ENCOUNTER — HEALTH MAINTENANCE LETTER (OUTPATIENT)
Age: 60
End: 2020-02-10

## 2020-03-24 DIAGNOSIS — Z79.4 TYPE 2 DIABETES MELLITUS WITH BOTH EYES AFFECTED BY MILD NONPROLIFERATIVE RETINOPATHY AND MACULAR EDEMA, WITH LONG-TERM CURRENT USE OF INSULIN (H): Primary | ICD-10-CM

## 2020-03-24 DIAGNOSIS — E11.3213 TYPE 2 DIABETES MELLITUS WITH BOTH EYES AFFECTED BY MILD NONPROLIFERATIVE RETINOPATHY AND MACULAR EDEMA, WITH LONG-TERM CURRENT USE OF INSULIN (H): Primary | ICD-10-CM

## 2020-05-05 ENCOUNTER — MEDICAL CORRESPONDENCE (OUTPATIENT)
Dept: HEALTH INFORMATION MANAGEMENT | Facility: CLINIC | Age: 60
End: 2020-05-05

## 2020-05-05 ENCOUNTER — TELEPHONE (OUTPATIENT)
Dept: FAMILY MEDICINE | Facility: CLINIC | Age: 60
End: 2020-05-05

## 2020-05-05 ENCOUNTER — TRANSFERRED RECORDS (OUTPATIENT)
Dept: HEALTH INFORMATION MANAGEMENT | Facility: CLINIC | Age: 60
End: 2020-05-05

## 2020-05-05 NOTE — TELEPHONE ENCOUNTER
Reason for Call:  Other call back- Verbal confirmation    Detailed comments: Rina needs the provider or the nurse to call her back for verbal confirmation for the patient to receive home care services. Please call Rina back for any further questions or concerns and to confirm the request as well.     Phone Number Patient can be reached at: Other: [phone] Ext. [phone]- Rina     Best Time: Any     Can we leave a detailed message on this number? Not Applicable    Call taken on 5/5/2020 at 4:18 PM by Maria Elena Olsen

## 2020-05-06 NOTE — TELEPHONE ENCOUNTER
[place] [facility] - return call to Rina - writer doesn't see recent referral, [facility] in [place] [facility] requested [facility] of information and information about who is trying to initiate services (referral, provider, private pay) - please provide more information on the initiation and reason of services being requested    [facility] - are you aware of new home care service initiation/referral and for what reason?  Do you want to give verbal?

## 2020-05-06 NOTE — TELEPHONE ENCOUNTER
[facility] was in [facility] and they referred her to home care - she is discharging today - skilled nursing for medication management - OK for verbal orders to initiate home care given

## 2020-05-07 DIAGNOSIS — Z79.4 TYPE 2 DIABETES MELLITUS WITH MILD NONPROLIFERATIVE RETINOPATHY WITHOUT MACULAR EDEMA, WITH LONG-TERM CURRENT USE OF INSULIN, UNSPECIFIED LATERALITY (H): ICD-10-CM

## 2020-05-07 DIAGNOSIS — E11.3299 TYPE 2 DIABETES MELLITUS WITH MILD NONPROLIFERATIVE RETINOPATHY WITHOUT MACULAR EDEMA, WITH LONG-TERM CURRENT USE OF INSULIN, UNSPECIFIED LATERALITY (H): ICD-10-CM

## 2020-05-07 RX ORDER — LANCETS
EACH MISCELLANEOUS
Qty: 100 EACH | Refills: 6 | Status: SHIPPED | OUTPATIENT
Start: 2020-05-07 | End: 2021-08-30

## 2020-05-07 RX ORDER — GLUCOSAMINE HCL/CHONDROITIN SU 500-400 MG
CAPSULE ORAL
Qty: 100 EACH | Refills: 3 | Status: SHIPPED | OUTPATIENT
Start: 2020-05-07 | End: 2021-08-30

## 2020-05-07 NOTE — TELEPHONE ENCOUNTER
Patients daughter had now called wanting to speak with nursing or PCP regarding the insulin needles and stating that patient has not had her insuline in the past 3 days.  Please call Kimberlee (Daughter) back when able.  Tel: [phone]

## 2020-05-07 NOTE — TELEPHONE ENCOUNTER
Called Kimberlee the daughter. Informed her the alcohol swabs, test stripes, monitor, needles and lancets have been filled and sent over to the [facility] in [place] on [address].  She will go and pick everything up for her mother.    Gisselle Brennan RN on 5/7/2020 at 12:41 PM

## 2020-05-07 NOTE — TELEPHONE ENCOUNTER
Can we please call the patient and get the information about her discharging nursing home. I need the discharge records to see what medications she is currently taking. Otherwise I will be unable to refill any medications tomorrow without this information.   ART Oliver CNP

## 2020-05-07 NOTE — TELEPHONE ENCOUNTER
"Reason for Call:Med request    Detailed comments: Patient's Home care nurse had called stating patient needs needles for her insulin.       Requested Prescriptions   Pending Prescriptions Disp Refills     insulin pen needle (NOVOFINE 30) 30G X 8 MM miscellaneous 400 each 0     Sig: USE 4 DAILY OR AS DIRECTED   Last Written Prescription Date:  11/21/2019  Last Fill Quantity: 400,  # refills: 0   Last office visit: 12/24/2019 with prescribing provider:     Future Office Visit:              Diabetic Supplies Protocol Passed - 5/7/2020  8:59 AM        Passed - Medication is active on med list        Passed - Patient is 18 years of age or older        Passed - Recent (6 mo) or future (30 days) visit within the authorizing provider's specialty     Patient had office visit in the last 6 months or has a visit in the next 30 days with authorizing provider.  See \"Patient Info\" tab in inbasket, or \"Choose Columns\" in Meds & Orders section of the refill encounter.                       "

## 2020-05-07 NOTE — TELEPHONE ENCOUNTER
Called Nena.  She does have an appointment with Rowena Haas 05/08/2020 at 0830am.  Since she has been discharged from the nursing home she has not checked her blood sugars or given herself any insulin in 3 days. She is out of her needles and does not have a glucose monitor.   Pt would like a call back once her prescriptions have been sent to the pharmacy.      Routing to provider to review diabetic supplies and order as appropriate.      Gisselle Brennan RN on 5/7/2020 at 10:39 AM

## 2020-05-08 ENCOUNTER — VIRTUAL VISIT (OUTPATIENT)
Dept: BEHAVIORAL HEALTH | Facility: CLINIC | Age: 60
End: 2020-05-08
Payer: COMMERCIAL

## 2020-05-08 ENCOUNTER — VIRTUAL VISIT (OUTPATIENT)
Dept: FAMILY MEDICINE | Facility: CLINIC | Age: 60
End: 2020-05-08
Payer: COMMERCIAL

## 2020-05-08 DIAGNOSIS — E66.01 MORBID OBESITY (H): ICD-10-CM

## 2020-05-08 DIAGNOSIS — Z79.4 TYPE 2 DIABETES MELLITUS WITH STAGE 3 CHRONIC KIDNEY DISEASE, WITH LONG-TERM CURRENT USE OF INSULIN (H): Primary | ICD-10-CM

## 2020-05-08 DIAGNOSIS — I10 HTN, GOAL BELOW 140/90: ICD-10-CM

## 2020-05-08 DIAGNOSIS — F25.1 SCHIZOAFFECTIVE DISORDER, DEPRESSIVE TYPE (H): Primary | ICD-10-CM

## 2020-05-08 DIAGNOSIS — K21.9 GASTROESOPHAGEAL REFLUX DISEASE WITHOUT ESOPHAGITIS: ICD-10-CM

## 2020-05-08 DIAGNOSIS — F43.10 POSTTRAUMATIC STRESS DISORDER: ICD-10-CM

## 2020-05-08 DIAGNOSIS — F25.1 SCHIZOAFFECTIVE DISORDER, DEPRESSIVE TYPE (H): ICD-10-CM

## 2020-05-08 DIAGNOSIS — E11.22 TYPE 2 DIABETES MELLITUS WITH STAGE 3 CHRONIC KIDNEY DISEASE, WITH LONG-TERM CURRENT USE OF INSULIN (H): Primary | ICD-10-CM

## 2020-05-08 DIAGNOSIS — F19.20 CHEMICAL DEPENDENCY (H): ICD-10-CM

## 2020-05-08 DIAGNOSIS — N18.30 TYPE 2 DIABETES MELLITUS WITH STAGE 3 CHRONIC KIDNEY DISEASE, WITH LONG-TERM CURRENT USE OF INSULIN (H): Primary | ICD-10-CM

## 2020-05-08 PROCEDURE — 99214 OFFICE O/P EST MOD 30 MIN: CPT | Mod: TEL | Performed by: NURSE PRACTITIONER

## 2020-05-08 PROCEDURE — 90832 PSYTX W PT 30 MINUTES: CPT | Mod: 95 | Performed by: SOCIAL WORKER

## 2020-05-08 RX ORDER — FAMOTIDINE 20 MG/1
20 TABLET, FILM COATED ORAL DAILY
Qty: 30 TABLET | Refills: 1 | Status: SHIPPED | OUTPATIENT
Start: 2020-05-08 | End: 2020-08-03

## 2020-05-08 NOTE — PROGRESS NOTES
[facility]   May 8, 2020  Behavioral Health Clinician Progress Note    Patient Name: Nena Tang           Service Type:  Individual      Service Location:   Face to Face in Clinic     Session Start Time: 8:35am  Session End Time: 9:05am      Session Length: 38 - 52      Attendees: Patient    Visit Activities (Refresh list every visit): [facility]     Diagnostic Assessment Date: 1-23-18 Updated DA completed December 12, 2019  CGI date completed: 12-  Treatment Plan Review Date: 3-  See Flowsheets for today's PHQ-9 and TAMIA-7 results  Previous PHQ-9:   PHQ-9 SCORE 3/13/2018 10/26/2018 5/7/2019   PHQ-9 Total Score - - -   PHQ-9 Total Score - - -   PHQ-9 Total Score 14 20 22     Previous TAMIA-7:   TAMIA-7 SCORE 2/3/2017 3/13/2018 10/26/2018   Total Score - - -   Total Score 0 17 19   Total Score - - -       ALEXANDRA LEVEL:  ALEXANDRA Score (Last Two) 8/30/2011 6/9/2014   ALEXANDRA Raw Score 42 37   Activation Score 66 49.9   ALEXANDRA Level 3 2       DATA  Extended Session (60+ minutes): No  Interactive Complexity: No  Crisis: Yes, visit entailed Crisis Management / Stabilization requiring urgent assessment and history of the crisis state, mental status exam and disposition  [facility] Patient: No    Treatment Objective(s) Addressed in This Session:  Target Behavior(s): disease management/lifestyle changes mental health    Depressed Mood: Decrease frequency and intensity of feeling down, depressed, hopeless  Improve quantity and quality of night time sleep / decrease daytime naps  Identify negative self-talk and behaviors: challenge core beliefs, myths, and actions  Improve concentration, focus, and mindfulness in daily activities   Decrease thoughts that you'd be better off dead or of suicide / self-harm  Anxiety: will experience a reduction in anxiety and will increase ability to function adaptively  Functional Impairment: will effectively address problems that interfere with adaptive  functioning  Risk / Safety: will develop strategies for more effective management of risk issues  Psychological distress related to Diabetes  Psychological distress related to Chronic Disease Management    Current Stressors / Issues:    The patient was seen by [facility] per the request of the PCP-she is has been staying with her sister for the past week and she was discharged from the nursing home-she is planning to move to another address with her sister soon-she stated the discharge was her decision due to someone else being tested positive-she still needs a high level of care that she is not getting now-she has services in the home (PT, OT, Nurse to set up meds, showering, ARHMS worker and CADII wavier)-[facility] in [place]    Mood has been bad as her mood has been low and heavy-she has some frustration with how she was treated at her nursing home-sister has been support to get her out of house-she will also participate in the OT-no suicidal thoughts and no hallucinations-with the medication she is sleeping better-she was encouraged to having some healthy distraction with moving attention to something that could promote peace and ronal-      Progress on Treatment Objective(s) / Homework:  Worsening - CONTEMPLATION (Considering change and yet undecided); Intervened by assessing the negative and positive thinking (ambivalence) about behavior change    Motivational Interviewing    MI Intervention: Expressed Empathy/Understanding, Supported Autonomy, Collaboration, Evocation, Permission to raise concern or advise, Open-ended questions, Reflections: simple and complex, Rolled with resistance: Emphasized patient autonomy, Simple reflection and Evoked patient agenda and Change talk (evoked)     Change Talk Expressed by the Patient: Desire to change Reasons to change Need to change Activation Taking steps    Provider Response to Change Talk: E - Evoked more info from patient about behavior change, A - Affirmed patient's  thoughts, decisions, or attempts at behavior change, R - Reflected patient's change talk and S - Summarized patient's change talk statements      Care Plan review completed: No    Medication Review:  No changes to current psychiatric medication(s)     Medication Compliance:  NA    Changes in Health Issues:   None reported     Chemical Use Review:   Substance Use: Chemical use reviewed, no active concerns identified      Tobacco Use: No current tobacco use.      Assessment: Current Emotional / Mental Status (status of significant symptoms):  Risk status (Self / Other harm or suicidal ideation)  Patient has had a history of suicidal ideation: yes  Patient denies current fears or concerns for personal safety.  Patient reports the following current or recent suicidal ideation or behaviors: stated that she attempted to kill self recently with over taking her medications .  Patient denies current or recent homicidal ideation or behaviors.  Patient denies current or recent self injurious behavior or ideation.  Patient denies other safety concerns.  A safety and risk management plan has not been developed at this time, however patient was encouraged to call [place] [address] should there be a change in any of these risk factors.    Appearance:   Appropriate   Eye Contact:   Good   Psychomotor Behavior: Normal   Attitude:   Cooperative   Orientation:   All  Speech   Rate / Production: Normal    Volume:  Normal   Mood:    Anxious  Depressed  Sad   Affect:    Blunted  Worrisome   Thought Content:  Clear   Thought Form:  Coherent  Goal Directed   Insight:    Fair     Diagnoses:  1. Schizoaffective disorder, depressive type (H)    2. Posttraumatic stress disorder        Collateral Reports Completed:  Not Applicable    Plan: (Homework, other):  Patient was given information about behavioral services and encouraged to schedule a follow up appointment with the clinic [facility] as needed to work on helping the patient with management of  mood states and to work on stress management around her grieving process-also to help the patient with her behavioral needs to comply with treatment medical recommendations.  She was also given information about mental health symptoms and treatment options .  CD Recommendations: Maintain Sobriety.    Richardson Lopez, [facility], [facility]                                                    Treatment Plan    Client's Name: Nena Tang  YOB: 1960    Date: Reviewed/updated on 5-8-2020     DSM5 Diagnoses: (Sustained by DSM5 Criteria Listed Above)  Diagnoses:  295.70 (F25.0), Schizoaffective disorder, depressive type; 309.81 (F43.10) Posttraumatic Stress Disorder with panic specifier, history of chemical dependency issues (polysubstance dependence)  Psychosocial & Contextual Factors: Academics / Education - yes  Activities of Daily Living - yes  Financial management yes  Follow through with Medical recommendations - yes  Occupational / Vocational - yes  Social / Relational - yes, grief and loss  WHODAS Score: 30      Referral / Collaboration:  Referral to another professional/service is not indicated at this time..    Anticipated number of session or this episode of care: 20      MeasurableTreatment Goal(s) related to diagnosis / functional impairment(s)  Goal 1: Client will improve her ability to manage anxiety and mood states.     I will know I've met my goal when the man does not paralyze the me with fear.     Objective #A (Client Action)    Client will increase understanding of steps in the grief process.  Status: Continued - Date(s):  5-8-2020 the patient reported that she is no longer seeing the man (no move visual hallucination)-  Intervention(s)  Therapist will teach emotional recognition/identification. teach the navigation of grieving encouraging acceptance and adjustment.    Objective #B  Client will Decrease frequency and intensity of feeling down, depressed, hopeless  Decrease thoughts that you'd be  better off dead or of suicide / self-harm.  Status: Continued - Date(s):   5-8-2020-Mood has been bad as her mood has been low and heavy-she has some frustration with how she was treated at her nursing home-sister has been support to get her out of house-she will also participate in the OT-no suicidal thoughts and no hallucinations-with the medication she is sleeping better-she was encouraged to having some healthy distraction with moving attention to something that could promote peace and ronal-    Intervention(s)  Therapist will teach emotional regulation skills. with the use of mindful coping strategies and open communication of feelings and thoughts (getting it out to another person)    Client has reviewed and agreed to the above plan.      Richardson Lopez, [facility]  5-8-2020        ______________________________________________________________________

## 2020-05-08 NOTE — PATIENT INSTRUCTIONS
-Sign [facility] for discharge paperwork.    -Call clinic with any questions or concerns.

## 2020-05-08 NOTE — PROGRESS NOTES
"Nena Tang is a 60 year old female who is being evaluated via a billable telephone visit.      The patient has been notified of following:     \"This telephone visit will be conducted via a call between you and your physician/provider. We have found that certain health care needs can be provided without the need for a physical exam.  This service lets us provide the care you need with a short phone conversation.  If a prescription is necessary we can send it directly to your pharmacy.  If lab work is needed we can place an order for that and you can then stop by our lab to have the test done at a later time.    Telephone visits are billed at different rates depending on your insurance coverage. During this emergency period, for some insurers they may be billed the same as an in-person visit.  Please reach out to your insurance provider with any questions.    If during the course of the call the physician/provider feels a telephone visit is not appropriate, you will not be charged for this service.\"    Patient has given verbal consent for Telephone visit?  Yes    How would you like to obtain your AVS?patient will .     Subjective     Nena Tang is a 60 year old female who presents to clinic today for the following health issues:    HPI   Nursing Home Discharge.   Patient reports that she has been staying with her sister. States that she was discharged from the [facility].   Patient states that when she was discharged, she was not given all her medications from the nursing home. Patient states that someone tested positive for the covid19 and she did not feel safe there and opted to leave the nursing home at this time.   Patient states that she has services coming in to her home. PT/OT. Nurse to set up medications, someone to help with the showers.     Mental Health  Patient reports that her mood is still low. Reports that she did not see a psychiatrist or therapist when " she was at the nursing home. Patient states that her sister has been helping out. Patient denies any hallucinations. Denies any suicidal thoughts or ideations.     Patient has been sleeping ok- was on Trazodone; but sates she does not like it; but it helps with keeping her asleep at night.     Scheduled for OT today.     Patient states that she is overweight and has not been active.      Urine Incontinence: Reports some urgency and has been using depends to help with the incontinence.     Constipation even with bowel prep medications- was on MOM.  Does not have anything right now prescribed.     Nurse: Gisselle- has been coming to set up her medications.    Diabetes: Has not had any insulin for 3 days. Called for prescription refills. Will need all diabetes supplies to restart her insulin.     No discharge paperwork was sent to clinic prior to this visit. Will need these to clarify information and current medications.       Review of Systems   Constitutional, HEENT, cardiovascular, pulmonary, gi and gu systems are negative, except as otherwise noted.       Objective   Reported vitals:  [facility] 01/06/2015    PSYCH: Alert and oriented times 3; coherent speech, normal   rate and volume, able to articulate logical thoughts, able   to abstract reason, no tangential thoughts, no hallucinations   or delusions.  RESP: No cough, no audible wheezing, able to talk in full sentences  Remainder of exam unable to be completed due to telephone visits    Diagnostic Test Results:  Labs reviewed in Epic        Assessment/Plan:  1. Type 2 diabetes mellitus with stage 3 chronic kidney disease, with long-term current use of insulin (H)  2. overweight - BMI >35  Uncontrolled diabetes from previous visits. Her last A1C from outside facility was 8.1 (3/6/2020) Patient reports increase in her weight and also decreased activity level.   -Waiting for her discharge orders to reconcile her medication list appropriately.   -Will get patient scheduled  with MTM also for diabetes management.     2. Chemical dependency (H)  Patient reports ongoing sobriety. States that she has not used anything since her admission into the nursing home.    3. Schizoaffective disorder, depressive type (H)  4. Posttraumatic stress disorder  Patient reports that she is not doing good at all. Patient denies any hallucinations. Notes that she has not seen a psychiatrist or therapist since she was last seen last December.   -Will schedule with [facility] ([facility]) for talk therapy.     5. Gastroesophageal reflux disease without esophagitis  Stable. Medication reconciliation.   - famotidine (PEPCID) 20 MG tablet; Take 1 tablet (20 mg) by mouth daily  Dispense: 30 tablet; Refill: 1    6. HTN, goal below 140/90  Unsure about her current BP. Patient reports that the weekly nurse has been checking her vitals and nothing has been said about her BP being concerning.   -Patient denies any headaches, chest pain, shortness of breath or heart palpitations.        Phone call duration:  27 minutes  I spent 25 min spent on the phone with Nena Tang, greater than 50% in counseling and coordination of care for:  Diabetes, Depression, Anxiety, and Hypertension.       ART Oliver [facility]

## 2020-05-08 NOTE — Clinical Note
Hi, just discontinued her novolog sliding scale today 5/11 because she is struggling with managing her sugars as written. I increased her Lantus to 42 units at bedtime. She is on your schedule for next week.   Thanks, Rowena.

## 2020-05-11 ENCOUNTER — TELEPHONE (OUTPATIENT)
Dept: FAMILY MEDICINE | Facility: CLINIC | Age: 60
End: 2020-05-11

## 2020-05-11 ENCOUNTER — MEDICAL CORRESPONDENCE (OUTPATIENT)
Dept: HEALTH INFORMATION MANAGEMENT | Facility: CLINIC | Age: 60
End: 2020-05-11

## 2020-05-11 DIAGNOSIS — E11.3299 TYPE 2 DIABETES MELLITUS WITH MILD NONPROLIFERATIVE RETINOPATHY WITHOUT MACULAR EDEMA, WITH LONG-TERM CURRENT USE OF INSULIN, UNSPECIFIED LATERALITY (H): ICD-10-CM

## 2020-05-11 DIAGNOSIS — Z79.4 TYPE 2 DIABETES MELLITUS WITH MILD NONPROLIFERATIVE RETINOPATHY WITHOUT MACULAR EDEMA, WITH LONG-TERM CURRENT USE OF INSULIN, UNSPECIFIED LATERALITY (H): ICD-10-CM

## 2020-05-11 NOTE — TELEPHONE ENCOUNTER
Reason for Call:  Other    Detailed comments: Message to provider - patient said her sugar went up over 400. The insulin she's on isn't working. Needs to speak with pcp about this. States it's very important.    Phone Number Patient can be reached at: Cell number on file:    Telephone Information:   Mobile [phone]       Best Time: anytime    Can we leave a detailed message on this number? YES    Call taken on 5/11/2020 at 10:31 AM by Bertha Patel

## 2020-05-11 NOTE — TELEPHONE ENCOUNTER
Please call Nena and have her discontinue the novolog sliding scale and increase her Lantus to 42 units at bedtime.  Have her continue with checking her blood sugars three times per day and then call with blood sugar report on Wednesday/Thursday.   Please let her know I will be in clinic on Monday and will reach out to her on that day if she has any questions in regards to the rest of her medications.   ART Oliver CNP

## 2020-05-11 NOTE — TELEPHONE ENCOUNTER
Rowena/POD    dcd to home about 1 week ago    243 BG this am  5/10- 237 in am, 5p 414, bedtime 320  5/9- 210 am, afternoon 280, bedtime 395  5/8- 249 am, afternoon 401, bedtime 364  5/7-  Afternoon 384, hs 361  5/6- none done  She didn't have any insulin for three days before this date, they didn't do any bgs to check, her home care nurse got her insulin back in order    Currently taking 36u at hs of lantus ordered from the discharge  Before nursing home inpt she was doing 45u at hs of the lantus    She doesn't follow the sliding scale as it is hard for her to do as it is confusing to do the sliding scale for the pre meal dosing, she has been using  Up to 12units as the smaller amount was helping    She is thirsty a lot and makes her tired if her sugars are to high  Home care nurse comes out 1xweek    Richelle Tucker RN   [facility]

## 2020-05-11 NOTE — TELEPHONE ENCOUNTER
Detailed message given to pt and her sister, they verbalized understanding of all instructions    Richelle Tucker RN   [facility]

## 2020-05-12 ENCOUNTER — MEDICAL CORRESPONDENCE (OUTPATIENT)
Dept: HEALTH INFORMATION MANAGEMENT | Facility: CLINIC | Age: 60
End: 2020-05-12

## 2020-05-14 ENCOUNTER — TELEPHONE (OUTPATIENT)
Dept: FAMILY MEDICINE | Facility: CLINIC | Age: 60
End: 2020-05-14

## 2020-05-14 DIAGNOSIS — Z79.4 TYPE 2 DIABETES MELLITUS WITH MILD NONPROLIFERATIVE RETINOPATHY WITHOUT MACULAR EDEMA, WITH LONG-TERM CURRENT USE OF INSULIN, UNSPECIFIED LATERALITY (H): ICD-10-CM

## 2020-05-14 DIAGNOSIS — E11.3299 TYPE 2 DIABETES MELLITUS WITH MILD NONPROLIFERATIVE RETINOPATHY WITHOUT MACULAR EDEMA, WITH LONG-TERM CURRENT USE OF INSULIN, UNSPECIFIED LATERALITY (H): ICD-10-CM

## 2020-05-14 NOTE — TELEPHONE ENCOUNTER
Reason for Call:  Call back    Detailed comments: Patient had called stating her home care nurse was at her place and our clinic had told her to call when the nurse was there.  Writer is unsure what patient is talking about patient also seemed a little confused about the situation. Writer took down her home care nurses information incase there is something nursing or PCP wanted to speak with her about.    Suzie HYMAN RN direct line: [phone]    Phone Number Patient can be reached at: Home number on file [phone] (home)    Best Time: Anytime    Can we leave a detailed message on this number? YES    Call taken on 5/14/2020 at 12:05 PM by Pam Roth

## 2020-05-14 NOTE — TELEPHONE ENCOUNTER
Nena is new to home care. Her start of care was 5/6/20.    Spoke to Amy JNI ([phone])  with [facility] and she reports significant concern about the need for her med list to be reconciled.     She reports the patient is out of a significant portion of the medication on her list including, furosamide, trazadone, famotidine, sertraline, losartan, atorvastatin, paliperidone, topirimate, metoprolol, gabapentin, amantadine.    Please reconcile the med list, remove all meds patient should not be using and fax an updated list to [phone].     BP today was 172/87. Patient reported she feels dizzy during the day.     Patient reports her blood glucose is higher and she has been very thirsty.    Routing to provider to review.

## 2020-05-15 ENCOUNTER — HOSPITAL ENCOUNTER (EMERGENCY)
Facility: CLINIC | Age: 60
Discharge: HOME | End: 2020-05-15
Attending: EMERGENCY MEDICINE | Admitting: EMERGENCY MEDICINE
Payer: COMMERCIAL

## 2020-05-15 VITALS
OXYGEN SATURATION: 97 % | SYSTOLIC BLOOD PRESSURE: 160 MMHG | BODY MASS INDEX: 46.33 KG/M2 | DIASTOLIC BLOOD PRESSURE: 80 MMHG | TEMPERATURE: 98.9 F | HEART RATE: 86 BPM | WEIGHT: 236 LBS | RESPIRATION RATE: 16 BRPM | HEIGHT: 60 IN

## 2020-05-15 DIAGNOSIS — R73.9 HYPERGLYCEMIA: ICD-10-CM

## 2020-05-15 DIAGNOSIS — M62.81 GENERALIZED MUSCLE WEAKNESS: ICD-10-CM

## 2020-05-15 LAB
ALBUMIN SERPL-MCNC: 3.6 G/DL (ref 3.4–5)
ALP SERPL-CCNC: 103 U/L (ref 40–150)
ALT SERPL W P-5'-P-CCNC: 30 U/L (ref 0–50)
ANION GAP SERPL CALCULATED.3IONS-SCNC: 4 MMOL/L (ref 3–14)
AST SERPL W P-5'-P-CCNC: 19 U/L (ref 0–45)
BASE DEFICIT BLDV-SCNC: 4 MMOL/L
BASOPHILS # BLD AUTO: 0 10E9/L (ref 0–0.2)
BASOPHILS NFR BLD AUTO: 0.3 %
BILIRUB SERPL-MCNC: 0.2 MG/DL (ref 0.2–1.3)
BUN SERPL-MCNC: 14 MG/DL (ref 7–30)
CALCIUM SERPL-MCNC: 9.3 MG/DL (ref 8.5–10.1)
CHLORIDE SERPL-SCNC: 109 MMOL/L (ref 94–109)
CO2 SERPL-SCNC: 25 MMOL/L (ref 20–32)
CREAT SERPL-MCNC: 0.79 MG/DL (ref 0.52–1.04)
DIFFERENTIAL METHOD BLD: ABNORMAL
EOSINOPHIL # BLD AUTO: 0.1 10E9/L (ref 0–0.7)
EOSINOPHIL NFR BLD AUTO: 1.9 %
ERYTHROCYTE [DISTWIDTH] IN BLOOD BY AUTOMATED COUNT: 13.3 % (ref 10–15)
GFR SERPL CREATININE-BSD FRML MDRD: 82 ML/MIN/{1.73_M2}
GLUCOSE BLDC GLUCOMTR-MCNC: 261 MG/DL (ref 70–99)
GLUCOSE SERPL-MCNC: 311 MG/DL (ref 70–99)
HCO3 BLDV-SCNC: 22 MMOL/L (ref 21–28)
HCT VFR BLD AUTO: 34.7 % (ref 35–47)
HGB BLD-MCNC: 11.2 G/DL (ref 11.7–15.7)
IMM GRANULOCYTES # BLD: 0 10E9/L (ref 0–0.4)
IMM GRANULOCYTES NFR BLD: 0.3 %
KETONES BLD-SCNC: 0.1 MMOL/L (ref 0–0.6)
LACTATE BLD-SCNC: 1.5 MMOL/L (ref 0.7–2)
LYMPHOCYTES # BLD AUTO: 1.9 10E9/L (ref 0.8–5.3)
LYMPHOCYTES NFR BLD AUTO: 32 %
MCH RBC QN AUTO: 31.2 PG (ref 26.5–33)
MCHC RBC AUTO-ENTMCNC: 32.3 G/DL (ref 31.5–36.5)
MCV RBC AUTO: 97 FL (ref 78–100)
MONOCYTES # BLD AUTO: 0.6 10E9/L (ref 0–1.3)
MONOCYTES NFR BLD AUTO: 10.7 %
NEUTROPHILS # BLD AUTO: 3.2 10E9/L (ref 1.6–8.3)
NEUTROPHILS NFR BLD AUTO: 54.8 %
NRBC # BLD AUTO: 0 10*3/UL
NRBC BLD AUTO-RTO: 0 /100
O2/TOTAL GAS SETTING VFR VENT: NORMAL %
OXYHGB MFR BLDV: 69 %
PCO2 BLDV: 43 MM HG (ref 40–50)
PH BLDV: 7.32 PH (ref 7.32–7.43)
PLATELET # BLD AUTO: 189 10E9/L (ref 150–450)
PO2 BLDV: 38 MM HG (ref 25–47)
POTASSIUM SERPL-SCNC: 4.3 MMOL/L (ref 3.4–5.3)
PROT SERPL-MCNC: 7.8 G/DL (ref 6.8–8.8)
RBC # BLD AUTO: 3.59 10E12/L (ref 3.8–5.2)
SODIUM SERPL-SCNC: 138 MMOL/L (ref 133–144)
WBC # BLD AUTO: 5.9 10E9/L (ref 4–11)

## 2020-05-15 PROCEDURE — 85025 COMPLETE CBC W/AUTO DIFF WBC: CPT | Performed by: EMERGENCY MEDICINE

## 2020-05-15 PROCEDURE — 83605 ASSAY OF LACTIC ACID: CPT | Performed by: EMERGENCY MEDICINE

## 2020-05-15 PROCEDURE — 82805 BLOOD GASES W/O2 SATURATION: CPT | Performed by: EMERGENCY MEDICINE

## 2020-05-15 PROCEDURE — 80053 COMPREHEN METABOLIC PANEL: CPT | Performed by: EMERGENCY MEDICINE

## 2020-05-15 PROCEDURE — 82010 KETONE BODYS QUAN: CPT | Performed by: EMERGENCY MEDICINE

## 2020-05-15 PROCEDURE — 99283 EMERGENCY DEPT VISIT LOW MDM: CPT

## 2020-05-15 PROCEDURE — 00000146 ZZHCL STATISTIC GLUCOSE BY METER IP

## 2020-05-15 ASSESSMENT — MIFFLIN-ST. JEOR: SCORE: 1566.99

## 2020-05-15 ASSESSMENT — ENCOUNTER SYMPTOMS
COUGH: 1
SHORTNESS OF BREATH: 1
FATIGUE: 1
HEADACHES: 1
DIZZINESS: 1
FEVER: 0

## 2020-05-15 NOTE — ED AVS SNAPSHOT
Emergency Department  [address] [facility] [address]  Phone:  [phone]  Fax:  [phone]                                    Nena Tang   MRN: [id number]    Department:   Emergency Department   Date of Visit:  5/15/2020           After Visit Summary Signature Page    I have received my discharge instructions, and my questions have been answered. I have discussed any challenges I see with this plan with the nurse or doctor.    ..........................................................................................................................................  Patient/Patient Representative Signature      ..........................................................................................................................................  Patient Representative Print Name and Relationship to Patient    ..................................................               ................................................  Date                                   Time    ..........................................................................................................................................  Reviewed by Signature/Title    ...................................................              ..............................................  Date                                               Time          22EPIC Rev 08/18

## 2020-05-15 NOTE — ED NOTES
Bed: ED24  Expected date:   Expected time:   Means of arrival:   Comments:  519  60 F dizzy/hihg BP/no covid  1500

## 2020-05-15 NOTE — ED TRIAGE NOTES
Pt arrived via EMS - pt coming from home HTN the last couple days with elevated blood sugars having vision changes - pt was getting PT blood pressure elevated called PMD was told to come to ER

## 2020-05-15 NOTE — ED PROVIDER NOTES
History     Chief Complaint:  weakness    HPI   Nena Tang is a 60 year old female with a history of schizoaffective disorder, polysubstance abuse, CAD, Takotsubo cardiomyopathy, type 2 diabetes, hyperlipidemia, and hypertension who presents to the Emergency Department via EMS for evaluation of generalized weakness. The patient reports having weakness for the past couple days. She states her weakness has been accompanied by a headache and generalized fatigue. She also reports having swelling to her bilateral feet and ankles. She has a chronic cough and reports having some mild shortness of breath. She denies having any chest pain. She denies having a history of heart failure. She is afebrile here. Of note, she started taking Lantus for her type 2 diabetes about 1 week ago and stopped her sliding scare and reports her sugars have been high, mostly in the 400s.    Allergies:  Imidazole Antifungals  Ketoprofen  Lisinopril  Metformin  Metronidazole  Posaconazole    Medications:    Amantadine (Symmetrel)  Aspirin 81 mg  Compazine  Gabapentin  Hydroxyzine  Imitrex  Imodium   Insulin glargine (Lantus)  Lipitor  Losartan   Metoprolol succinate  Nystatin   Paliperidone (Invega)  Pepcid  Senokot  Topiramate  Trazodone  Zoloft    Past Medical History:    Acute respiratory failure with hypoxia   Alcohol use  CAD (coronary artery disease)  Cervicalgia  Chronic low back pain  Cocaine abuse, in remission  Depression  Heroin abuse   Hyperlipidemia  Hypertension  Illiterate  Infectious encephalopathy  Irritable bowel syndrome  Left cataract  Lumbago  Migraine  Nonproliferative diabetic retinopathy  Obesity  CINDY (obstructive sleep apnea)  Osteopenia  PTSD (post-traumatic stress disorder)  Restless leg syndrome  Rotator cuff syndrome  Schizoaffective disorder  Sepsis  Suicidal intent  Takotsubo cardiomyopathy  Type 2 diabetes mellitus  Uterine cancer  Verbal auditory hallucination  vitamin B12 deficiency    Past Surgical  History:    Cataract IOL, RT/LT  Hysterectomy  Laparoscopic cholecystectomy  Oophorectomy  Phacoemulsification clear cornea with standard intraocular lens implant, left  Phacoemulsification clear cornea with standard intraocular lens implant, right  Release trigger finger    Family History:    Alcohol/drug  Bladder cancer  CAD  Colorectal cancer  COPD  Diabetes  Glaucoma   Hyperlipidemia  Hypertension  Liver cirrhosis   Mental illness    Social History:  The patient presents to the ED via EMS  Marital status:   Smoking status: Never Smoker  Alcohol use: No (hx alcohol abuse)  Drug use: No (hx polysubstance use)  PCP: Rowena Haas    Review of Systems   Constitutional: Positive for fatigue. Negative for fever.   Respiratory: Positive for cough and shortness of breath.    Cardiovascular: Positive for leg swelling. Negative for chest pain.   Neurological: Positive for dizziness and headaches.   10 point review of systems performed and is negative except as above and in HPI.    Physical Exam     Patient Vitals for the past 24 hrs:   BP Temp Temp src Pulse Resp SpO2 Height Weight   05/15/20 1821 (!) 160/80 -- -- 86 -- -- -- --   05/15/20 1730 (!) 165/80 -- -- 87 -- -- -- --   05/15/20 1645 (!) 150/73 -- -- 85 -- 97 % -- --   05/15/20 1517 -- -- -- -- -- 97 % -- --   05/15/20 1515 (!) 154/79 -- -- -- -- 96 % -- --   05/15/20 1510 -- 98.9  F (37.2  C) Oral -- 16 96 % 1.524 m (5') 107 kg (236 lb)   05/15/20 1509 (!) 153/83 -- -- 88 -- -- -- --       Physical Exam   General: Resting on the gurney, appears comfortable  Head:  The scalp, face, and head appear normal  Mouth/Throat: Mucus membranes are moist  CV:  Regular rate    Normal S1 and S2  No pathological murmur   Resp:  Breath sounds clear and equal bilaterally    Non-labored, no retractions or accessory muscle use    No coarseness    No wheezing   GI:  Abdomen is soft, no rigidity    No tenderness to palpation  MS:  Normal motor assessment of all  extremities.    Good capillary refill noted.    No pitting lower extremity edema.  Skin:  No rash or lesions noted.  Neuro: Speech is normal and fluent. No apparent deficit.    Cranial nerves II-XII intact by examination.     Sensation and strength intact x4.   Psych: Awake. Alert.  Normal affect.      Appropriate interactions.    Emergency Department Course     Laboratory:  CBC: HGB 11.2 (L), o/w WNL (WBC 5.9, )  CMP: Glucose 311 (H), o/w WNL (Creatinine 0.79)  Lactic Acid Whole Blood: 1.5  Blood Gas Venous and Oxyhgb: WNL  Ketone Beta-Hydroxybutyrate Quantitative: 0.1  (1733) Glucose by Meter: 261 (H)    Emergency Department Course:  The patient arrived in the emergency department via EMS.    Past medical records, nursing notes, and vitals reviewed.  1513: I performed an exam of the patient and obtained history, as documented above.   IV inserted and blood samples were collected and sent for laboratory testing, findings above.    1657: I rechecked the patient and explained the findings.    Findings and plan explained to the patient. Patient discharged home with instructions regarding supportive care and reasons to return. The importance of close follow-up was reviewed.    Impression & Plan      Medical Decision Making:  Nena Tang is a 60 year old female who presents for evaluation of dizziness and generalized weakness. The workup here in the emergency room was to evaluate for infections, metabolic, electrolyte, neurologic, muscle or cardiovascular causes. She was recently started on Lantus for her type 2 diabetes. Glucose is 311, which is less than it has been at home for the past few days.  There are no signs of DKA, no clinical features to suggest hyperosmolar issues. I do not believe symptoms are due to CVA, TIA, myesthesia gravis, myopathy or acute coronary syndromes and there are no indications at this point for a further workup with a benign history, exam and laboratory tests.  Doubt spinal  pathology or other central etiology of symptoms.  Ambulated in ED and did well.  I believe supportive outpatient management is indicated; will have them follow up with their primary care doctor in 1-2 days for a recheck. Return for any additional symptoms or worsening weakness.     Diagnosis:    ICD-10-CM   1. Hyperglycemia  R73.9   2. Generalized muscle weakness  M62.81       Disposition:  discharged to home      Raisa Acosta  5/15/2020    EMERGENCY DEPARTMENT    Scribe Disclosure:  I, Raisa Acosta, am serving as a scribe at 3:13 PM on 5/15/2020 to document services personally performed by Matilde Morin MD based on my observations and the provider's statements to me.        Matilde Morin MD  05/15/20 2036

## 2020-05-18 ENCOUNTER — TELEPHONE (OUTPATIENT)
Dept: FAMILY MEDICINE | Facility: CLINIC | Age: 60
End: 2020-05-18

## 2020-05-18 DIAGNOSIS — G89.29 CHRONIC RIGHT-SIDED LOW BACK PAIN WITHOUT SCIATICA: ICD-10-CM

## 2020-05-18 DIAGNOSIS — E78.5 HYPERLIPIDEMIA LDL GOAL <100: ICD-10-CM

## 2020-05-18 DIAGNOSIS — M54.50 CHRONIC RIGHT-SIDED LOW BACK PAIN WITHOUT SCIATICA: ICD-10-CM

## 2020-05-18 DIAGNOSIS — F25.0 SCHIZOAFFECTIVE DISORDER, BIPOLAR TYPE (H): ICD-10-CM

## 2020-05-18 DIAGNOSIS — R60.0 LOCALIZED EDEMA: Primary | ICD-10-CM

## 2020-05-18 DIAGNOSIS — I25.119 CORONARY ARTERY DISEASE INVOLVING NATIVE HEART WITH ANGINA PECTORIS, UNSPECIFIED VESSEL OR LESION TYPE (H): ICD-10-CM

## 2020-05-18 DIAGNOSIS — F25.1 SCHIZOAFFECTIVE DISORDER, DEPRESSIVE TYPE (H): ICD-10-CM

## 2020-05-18 RX ORDER — AMANTADINE HYDROCHLORIDE 100 MG/1
100 CAPSULE, GELATIN COATED ORAL 2 TIMES DAILY
Qty: 60 CAPSULE | Refills: 1 | Status: SHIPPED | OUTPATIENT
Start: 2020-05-18 | End: 2020-08-03

## 2020-05-18 RX ORDER — DULAGLUTIDE 1.5 MG/.5ML
1.5 INJECTION, SOLUTION SUBCUTANEOUS WEEKLY
COMMUNITY
Start: 2020-04-10 | End: 2020-05-19

## 2020-05-18 RX ORDER — FUROSEMIDE 20 MG
20 TABLET ORAL DAILY
COMMUNITY
Start: 2020-04-10 | End: 2020-05-18

## 2020-05-18 RX ORDER — LOSARTAN POTASSIUM 100 MG/1
100 TABLET ORAL DAILY
Qty: 30 TABLET | Refills: 1 | Status: SHIPPED | OUTPATIENT
Start: 2020-05-18 | End: 2020-08-13

## 2020-05-18 RX ORDER — TRAZODONE HYDROCHLORIDE 100 MG/1
100 TABLET ORAL
Qty: 30 TABLET | Refills: 1 | Status: SHIPPED | OUTPATIENT
Start: 2020-05-18 | End: 2020-08-03

## 2020-05-18 RX ORDER — FUROSEMIDE 20 MG
20 TABLET ORAL DAILY
Qty: 60 TABLET | Refills: 1 | Status: SHIPPED | OUTPATIENT
Start: 2020-05-18 | End: 2020-07-21

## 2020-05-18 RX ORDER — METOPROLOL SUCCINATE 25 MG/1
25 TABLET, EXTENDED RELEASE ORAL DAILY
Qty: 90 TABLET | Refills: 1 | Status: SHIPPED | OUTPATIENT
Start: 2020-05-18 | End: 2020-05-22

## 2020-05-18 RX ORDER — SERTRALINE HYDROCHLORIDE 100 MG/1
200 TABLET, FILM COATED ORAL DAILY
Qty: 60 TABLET | Refills: 3 | Status: SHIPPED | OUTPATIENT
Start: 2020-05-18 | End: 2020-10-07

## 2020-05-18 RX ORDER — PALIPERIDONE 9 MG/1
9 TABLET, EXTENDED RELEASE ORAL AT BEDTIME
Qty: 60 TABLET | Refills: 1 | Status: SHIPPED | OUTPATIENT
Start: 2020-05-18 | End: 2020-09-16

## 2020-05-18 RX ORDER — GABAPENTIN 300 MG/1
300 CAPSULE ORAL AT BEDTIME
Qty: 30 CAPSULE | Refills: 1 | Status: SHIPPED | OUTPATIENT
Start: 2020-05-18 | End: 2020-08-14

## 2020-05-18 RX ORDER — TOPIRAMATE 50 MG/1
50 TABLET, FILM COATED ORAL AT BEDTIME
Qty: 30 TABLET | Refills: 1 | Status: SHIPPED | OUTPATIENT
Start: 2020-05-18 | End: 2020-08-03

## 2020-05-18 RX ORDER — ATORVASTATIN CALCIUM 80 MG/1
TABLET, FILM COATED ORAL
Qty: 30 TABLET | Refills: 11 | Status: SHIPPED | OUTPATIENT
Start: 2020-05-18 | End: 2021-07-19

## 2020-05-18 NOTE — TELEPHONE ENCOUNTER
"Called patient she is looking for update on plan to manage blood sugar and blood pressure - patient picked up prescriptions today - 5/11 orders sent - she cannot read and doesn't know what medications she picked up - patient states doesn't have insulin pen needles    Writer called pharmacy - [id number] - [place], [place] - [address] AT [facility] [address] - some medications patient is not able to fill because insurance is saying \"fill is too early\" - patient recently discharged from nursing home - it is unknown if she was discharged home with medications - we have asked home care to do a medication reconciliation - she may require an insurance override which pharmacy is willing to help with -     metoprolol succinate ER (TOPROL-XL) 25 MG 24 hr tablet  - refill too soon - may  5/26 - last      losartan (COZAAR) 100 MG tablet  - picked up 5/18/2020    furosemide (LASIX) 20 MG tablet  - 5/18/20    Was able to give verbal for insulin pen needles - they had not been run through because of brand name Novofine requiring PA - they can fill today for patient to  - writer encouraged patient to  so she can take her insulin - also discussed assisting her with taking blood pressure medications this evening - she refused and will speak with nurse tomorrow regarding set up    Amy JIN ([phone])  with [facility] - left message advising office visit notes and updated medication list faxed 5/18/2020 - please help with medication reconciliation - ensure patient has insulin pen needles - we want patient to restart on blood pressure and insulin medication and then reassess her management - Waldo in clinic 5/19 - please call with any questions or discrepancies - patient may require insurance override if she is out of medications and unable to  from pharmacy  "

## 2020-05-18 NOTE — TELEPHONE ENCOUNTER
Medication reconciliation done. Refills done. Please fax updated list to the home care RN. Thanks!   Lantus dose has also been increased from the last appointment to 46 units at bedtime.   ART Oliver CNP

## 2020-05-18 NOTE — TELEPHONE ENCOUNTER
Forms completed and faxed back to [phone]. Also placed into abstraction to scan into patients chart.      Alysha Gonzalez MA

## 2020-05-18 NOTE — TELEPHONE ENCOUNTER
Patient had called very upset stating she is still waiting for a phone call. Patient also said her B/P and BS are very high that's why she wants to speak with someone on what she should do.  Tel: [phone]

## 2020-05-18 NOTE — TELEPHONE ENCOUNTER
Reason for Call:  Other call back    Detailed comments: Patient would like to talk to Rowena Haas or the nurse as soon as possible. Patient was in the hospital last Friday for High Blood Pressure and Hyperglycemia concerns. Patient is concern and would like to discuss these issues with her provider or the nurse for advise.     Phone Number Patient can be reached at: Home number on file [phone] (home)    Best Time: ASAP    Can we leave a detailed message on this number? YES    Call taken on 5/18/2020 at 9:47 AM by Maria Elena Olsen

## 2020-05-19 ENCOUNTER — MEDICAL CORRESPONDENCE (OUTPATIENT)
Dept: HEALTH INFORMATION MANAGEMENT | Facility: CLINIC | Age: 60
End: 2020-05-19

## 2020-05-19 ENCOUNTER — NURSE TRIAGE (OUTPATIENT)
Dept: NURSING | Facility: CLINIC | Age: 60
End: 2020-05-19

## 2020-05-19 DIAGNOSIS — N18.30 TYPE 2 DIABETES MELLITUS WITH STAGE 3 CHRONIC KIDNEY DISEASE, WITH LONG-TERM CURRENT USE OF INSULIN (H): Primary | ICD-10-CM

## 2020-05-19 DIAGNOSIS — F25.0 SCHIZOAFFECTIVE DISORDER, BIPOLAR TYPE (H): ICD-10-CM

## 2020-05-19 DIAGNOSIS — Z79.4 TYPE 2 DIABETES MELLITUS WITH STAGE 3 CHRONIC KIDNEY DISEASE, WITH LONG-TERM CURRENT USE OF INSULIN (H): Primary | ICD-10-CM

## 2020-05-19 DIAGNOSIS — K21.9 GASTROESOPHAGEAL REFLUX DISEASE WITHOUT ESOPHAGITIS: ICD-10-CM

## 2020-05-19 DIAGNOSIS — E11.22 TYPE 2 DIABETES MELLITUS WITH STAGE 3 CHRONIC KIDNEY DISEASE, WITH LONG-TERM CURRENT USE OF INSULIN (H): Primary | ICD-10-CM

## 2020-05-19 RX ORDER — DULAGLUTIDE 1.5 MG/.5ML
1.5 INJECTION, SOLUTION SUBCUTANEOUS WEEKLY
Qty: 4 ML | Refills: 1 | Status: SHIPPED | OUTPATIENT
Start: 2020-05-19 | End: 2020-08-03

## 2020-05-19 RX ORDER — FAMOTIDINE 20 MG/1
20 TABLET, FILM COATED ORAL DAILY
Qty: 30 TABLET | Refills: 1 | Status: CANCELLED
Start: 2020-05-19

## 2020-05-19 RX ORDER — CLONAZEPAM 0.5 MG/1
0.5 TABLET ORAL AT BEDTIME
Qty: 30 TABLET | Refills: 0 | Status: SHIPPED | OUTPATIENT
Start: 2020-05-19 | End: 2020-06-30

## 2020-05-19 NOTE — TELEPHONE ENCOUNTER
They have a med reconciliation issues from the home care nurse for the following medications:  The pharmacy is missing  Prescriptions for these medications: Pepcid, Trulicity,     She is missing these medications from the fax :   Compazine, Melatonin, Klonopin, Atarax, Senna, Imitrex.   Does she still need to take all of these?    Please call [facility] [phone] and update the orders. Thanks     Reason for Disposition    Caller has NON-URGENT medication question about med that PCP prescribed and triager unable to answer question    Additional Information    Negative: Caller has medication question only, adult not sick, and triager answers question    Protocols used: MEDICATION QUESTION CALL-A-

## 2020-05-20 ENCOUNTER — MEDICAL CORRESPONDENCE (OUTPATIENT)
Dept: HEALTH INFORMATION MANAGEMENT | Facility: CLINIC | Age: 60
End: 2020-05-20

## 2020-05-20 ENCOUNTER — ALLIED HEALTH/NURSE VISIT (OUTPATIENT)
Dept: PHARMACY | Facility: CLINIC | Age: 60
End: 2020-05-20
Payer: COMMERCIAL

## 2020-05-20 DIAGNOSIS — G47.00 INSOMNIA, UNSPECIFIED TYPE: ICD-10-CM

## 2020-05-20 DIAGNOSIS — Z79.4 TYPE 2 DIABETES MELLITUS WITH HYPERGLYCEMIA, WITH LONG-TERM CURRENT USE OF INSULIN (H): Primary | ICD-10-CM

## 2020-05-20 DIAGNOSIS — K21.00 GASTROESOPHAGEAL REFLUX DISEASE WITH ESOPHAGITIS: ICD-10-CM

## 2020-05-20 DIAGNOSIS — K59.00 CONSTIPATION, UNSPECIFIED CONSTIPATION TYPE: ICD-10-CM

## 2020-05-20 DIAGNOSIS — E11.65 TYPE 2 DIABETES MELLITUS WITH HYPERGLYCEMIA, WITH LONG-TERM CURRENT USE OF INSULIN (H): Primary | ICD-10-CM

## 2020-05-20 DIAGNOSIS — F25.1 SCHIZOAFFECTIVE DISORDER, DEPRESSIVE TYPE (H): ICD-10-CM

## 2020-05-20 DIAGNOSIS — R51.9 NONINTRACTABLE HEADACHE, UNSPECIFIED CHRONICITY PATTERN, UNSPECIFIED HEADACHE TYPE: ICD-10-CM

## 2020-05-20 PROCEDURE — 99605 MTMS BY PHARM NP 15 MIN: CPT | Performed by: PHARMACIST

## 2020-05-20 PROCEDURE — 99607 MTMS BY PHARM ADDL 15 MIN: CPT | Performed by: PHARMACIST

## 2020-05-20 RX ORDER — INSULIN ASPART 100 [IU]/ML
INJECTION, SOLUTION INTRAVENOUS; SUBCUTANEOUS
Qty: 15 ML | Refills: 1 | Status: SHIPPED | OUTPATIENT
Start: 2020-05-20 | End: 2020-06-30

## 2020-05-20 RX ORDER — HYDROXYZINE HYDROCHLORIDE 25 MG/1
25 TABLET, FILM COATED ORAL EVERY 4 HOURS PRN
Qty: 60 TABLET | Refills: 1 | Status: SHIPPED | OUTPATIENT
Start: 2020-05-20 | End: 2020-10-07

## 2020-05-20 RX ORDER — AMOXICILLIN 250 MG
1 CAPSULE ORAL 2 TIMES DAILY PRN
Qty: 60 TABLET | Refills: 1 | Status: SHIPPED | OUTPATIENT
Start: 2020-05-20 | End: 2020-08-06

## 2020-05-20 NOTE — TELEPHONE ENCOUNTER
Reordered famotidine, Trulicity and clonazepam. The rest are prn medications and I will need to double check with the patient if and how she has been using the medications. The patient is scheduled with Eugenia (ABHISHEK) tomorrow and I will ask for her to double check this for me also.   ART Oliver CNP

## 2020-05-20 NOTE — TELEPHONE ENCOUNTER
Attempted to reach Evangelina, unable. Left message  to call back.  Last office visit with PCP Waldo was 5/8/20. This was a phone visit. Is it just the copy of that visit they need?      Rosina Rao RN

## 2020-05-20 NOTE — TELEPHONE ENCOUNTER
Reason for Call:  Other call back    Detailed comments: Evangelina from Home care called checking on status of forms. Writer is not sure if previous forms faxed were the forms Evangelina was referring to. The type of forms faxed was not specified in the last message. Per Evangelina they have still not received the F2F physicians encounter form yet. They received a fax from us on 5/18/20 but it wasn't the forms it was a physicians order. Please assist, thank you!    Phone Number Patient can be reached at: Other phone number:  [phone] Fax:[phone]    Best Time: any    Can we leave a detailed message on this number? YES    Call taken on 5/20/2020 at 12:50 PM by Esther

## 2020-05-20 NOTE — PATIENT INSTRUCTIONS
Recommendations from today's MTM visit:                                                    MTM (medication therapy management) is a service provided by a clinical pharmacist designed to help you get the most of out of your medicines.   Today we reviewed what your medicines are for, how to know if they are working, that your medicines are safe and how to make your medicine regimen as easy as possible.     1. Start Novolog 10 units with your lunch and dinner.  Do the injection right at the start of your meal.     2. Test blood sugars before you eat    3.  famotidine (for stomach), clonazepam (sleep), Trulicity (blood sugar), hydroxyzine (as needed anxiety), senna (as needed constipation)    It was great to speak with you today.  I value your experience and would be very thankful for your time with providing feedback on our clinic survey. You may receive a survey via email or text message in the next few days.     Next MTM visit: 2 weeks by phone    To schedule another MTM appointment, please call the clinic directly or you may call the MTM scheduling line at [phone] or toll-free at [phone].     My Clinical Pharmacist's contact information:                                                      It was a pleasure talking with you today!  Please feel free to contact me with any questions or concerns you have.      Eugenia De Jesus, PharmD, [facility]   [phone]        Patient Education     Low Blood Sugar (Hypoglycemia)  Having too little sugar (glucose) in your blood is called low blood sugar (hypoglycemia). Low blood sugar often means anything lower than 70 mg/dL. Talk with your healthcare provider about your target range. Ask what level is too low for you. Diabetes itself doesn t cause low blood sugar. But some treatments for diabetes may raise your risk for it. These include pills or insulin. Low blood sugar may make you pass out or have a seizure. So always treat low blood sugar right away. But don't  overeat.     Special note: Always carry a source of fast-acting sugar and a snack in case of hypoglycemia.  What you may notice  If you have low blood sugar, you may have one or more of these symptoms:    Shakiness or dizziness    Cold, clammy skin or sweating    Feeling hungry    Headache    Nervousness    A hard, fast heartbeat    Weakness    Confusion or irritability    Blurred eyesight    Having nightmares or waking up confused or sweating    Numbness or tingling in the lips or tongue  What you should do  Here are tips to follow if you have hypoglycemia:     First check your blood sugar. If it's too low (out of your target range), eat or drink 15 to 20 grams of fast-acting sugar. This may be 3 to 4 glucose tablets, 4 ounces (half a cup) of fruit juice or regular (nondiet) soda, or 1 tablespoon of honey. Don t take more than this, or your blood sugar may go too high.    Don't eat foods high in protein such as milk or nuts to treat hypoglycemia. Protein may increase your insulin response. It may lower your blood sugar even more.    Wait 15 minutes. Then recheck your blood sugar if you can.    If your blood sugar is still too low, repeat the steps above and check your blood sugar again. If your blood sugar still has not gone back to your target range, contact your healthcare provider. Or seek emergency care.    Once your blood sugar is back at target range, eat a snack or meal.  Preventing low blood sugar  Things you can do include the following:     If your condition needs a strict treatment plan, eat meals and snacks at the same times each day. Don t skip meals!    If your treatment plan lets you change when and what you eat, learn how to change the time and dose of your rapid-acting insulin to match this.     Ask your healthcare provider if it's safe to drink alcohol. Never drink on an empty stomach.    Take your medicine at the prescribed times.    Always carry a source of fast-acting sugar and a snack when  you re away from home.    If you have had several hypoglycemic episodes, talk with your healthcare provider. See if you may be able to take less medicine. You also may have a condition where you no longer recognize the symptoms of low blood sugar until the value falls to dangerous levels.  Other things to do  Other tips include:    Carry a medical ID card or wear a medical alert bracelet or necklace. It should say that you have diabetes. It should also say what to do if you pass out or have a seizure.    Make sure your family, friends, and coworkers know the signs of low blood sugar. Tell them what to do if your blood sugar falls very low and you can t treat yourself.    Keep a glucagon emergency kit handy. Be sure your family, friends, and coworkers know how and when to use it. Check it often. Replace the glucagon before it expires.    Talk with your healthcare team about other things you can do to prevent low blood sugar.     If you have unexplained hypoglycemia or hypoglycemia several times, call your healthcare provider.  Date Last Reviewed: 11/1/2018 2000-2019 [employer]. [address] [place] [address], [place] [address]. All rights reserved. This information is not intended as a substitute for professional medical care. Always follow your healthcare professional's instructions.

## 2020-05-20 NOTE — PROGRESS NOTES
MTM ENCOUNTER  SUBJECTIVE/OBJECTIVE:                           Nena Tang is a 60 year old female called for a follow-up visit. She was referred to me from Rowena Haas.  Today's visit is a follow-up MTM visit from 12/3/19.    This is the first visit of this calendar year.    Patient consented to a telehealth visit: yes  Telemedicine Visit Details  Type of service:  Telephone visit  Start Time: 11:08 AM  End Time: 11:40 AM  Originating Location (pt. Location): Home  Distant Location (provider location):  [facility] MTM  Mode of Communication:  Telephone    Chief complaint: worried about her blood sugars  Tobacco:  reports that she has never smoked. She has never used smokeless tobacco.  Alcohol: not currently using    Medication Adherence/Access: no issues reported.  Living with her sister, moved in about 3 weeks ago after leaving rehab. Will be moving into a new apartment with her on 6/5.  Medications are now set up in a pill box by [facility]. Takes doses twice a day. Denies any missed doses.    Constipation: no current medication for constipation. Used Miralax in the nursing home but none since she came home.  Doesn't use any senna since coming home either. Not currently constipated but at times she would like the option to take PRN medication and prefers tablets.    Headache: doesn't have any Imitrex. Thinks she has headaches 2-3 times a week but not migraines.  Bad headaches 1 time per week but acetaminophen works well enough for headaches and doesn't need Imitrex at this time.    Sleep: currently taking trazodone and thinks this is working well enough that she doesn't need to take melatonin right now.    GERD: her stomach has been bothering her. Per Epic notes, out of Pepcid and reordered yesterday - hasn't picked up yet. She has not been nauseous and doesn't think she needs anything for PRN nausea.    Diabetes: Pt currently taking lantus 46u at bedtime. States she is not  "able to  Trulicity until 5/26, too soon to refill. Has not been taking any Novolog since moving in with her sister. Pt is not experiencing side effects.    SMBG: 3 times daily - sometimes before and sometimes after. Her sister is helping to track glucose. \"she's like a mom\"  Ranges (pt reported):   Date FBG/ 2hours post Lunch/2hours post Dinner /2hours post    5/16  240 335    5/17 350 260 330    5/18 269 /400     5/19 300 /406 330    5/20 267                       Patient is not experiencing hypoglycemia.  Recent symptoms of high blood sugar? polyuria, polydipsia. Recent ED visit for hyperglycemia.  Eye exam: up to date  Foot exam: up to date  ACEi/ARB: No.   Urine Albumin:         Lab Results   Component Value Date     UMALCR 4.47 05/24/2019      Aspirin: Taking 81mg daily and denies side effects  Diet/Exercise: eating twice a day, trying to limit portion sizes because of recent weight gain while in the nursing home. Dinner last night mashed potatoes, garlic bread, hawkins.  This morning only had banana, stomach has been upset.  Usually skipping breakfast.  Lab Results   Component Value Date    A1C 6.2 12/03/2019    A1C 6.6 08/06/2019    A1C 7.6 05/15/2019    A1C 6.4 12/20/2018    A1C 6.4 08/06/2018     Schizoaffective: Currently taking sertraline 200mg daily, Invega 9mg daily, amantadine 100mg BID, trazodone 100mg HS. Not taking clonazepam 0.5mg HS (for nighttime hallucinations, reordered yesterday).   Reports she hasn't been in a good mood because of her high blood sugars. Out of time to review mood in detail today.  Discussed use of hydroxyzine while she was at rehab and found it helpful as PRN for anxiety. Doesn't have any at home currently.     Today's Vitals: LMP 01/06/2015  none - phone call      ASSESSMENT:                            Medication Adherence: good, no issues identified. Recommend she continue to have assistance setting up a pill box as she has historically been successful with medications " at this level of support.    Constipation: stable. OK to have PRN senna available per pt preference and refilled.    Headache: controlled with acetaminophen. Will continue off Imitrex and monitor.    Sleep: stable off melatonin, did not refill. Out of time to review CINDY today; deferred to follow-up.    GERD: needs improvement; pt encouraged to  and restart famotidine as prescribed. Removed Compazine from her med list.    Diabetes: needs improvement. A1c at goal <7%, however recent glucoses since discharge from rehab center are very elevated. It will take several weeks to see benefits from restarting Trulicity and unlikely Lantus alone will adequately control her glucose readings.  Discussed safety concerns with Novolog (history of intentional overdose, hypoglycemia) and pt agreeable to involving her sister in reminding her to use the medication and monitoring glucoses.  Reviewed treatment of hypoglycemia in detail. Will dose conservatively with her lunch and dinner and close follow-up. May be able to discontinue once Trulicity is at full efficacy.     Schizoaffective: out of time to review in detail today. Sent refill of hydroxyzine to PRN use for anxiety. Pt is scheduled for follow-up with [facility] in 1 week and sister has been supportive.    PLAN:                            1. Start Novolog 10 units with lunch and dinner  2. Test blood sugar before meals  3. Restart famotidine, clonazepam, Trulicity as prescribed  4. Refilled hydroxyzine, senna-s for PRN use.  Discontinued Compazine, Imitrex, melatonin from med list.    I spent 30 minutes with this patient today. All changes were made via collaborative practice agreement with Rowena Haas. A copy of the visit note was provided to the patient's primary care provider.    Will follow up in 1 week with PCP, 2 weeks MTM.    The patient was sent via [employer] a summary of these recommendations.     Eugenia De Jesus, PharmD, BCACP

## 2020-05-21 ENCOUNTER — TELEPHONE (OUTPATIENT)
Dept: FAMILY MEDICINE | Facility: CLINIC | Age: 60
End: 2020-05-21

## 2020-05-21 DIAGNOSIS — Z79.4 TYPE 2 DIABETES MELLITUS WITH MILD NONPROLIFERATIVE RETINOPATHY WITHOUT MACULAR EDEMA, WITH LONG-TERM CURRENT USE OF INSULIN, UNSPECIFIED LATERALITY (H): ICD-10-CM

## 2020-05-21 DIAGNOSIS — E11.3299 TYPE 2 DIABETES MELLITUS WITH MILD NONPROLIFERATIVE RETINOPATHY WITHOUT MACULAR EDEMA, WITH LONG-TERM CURRENT USE OF INSULIN, UNSPECIFIED LATERALITY (H): ICD-10-CM

## 2020-05-21 DIAGNOSIS — I25.119 CORONARY ARTERY DISEASE INVOLVING NATIVE HEART WITH ANGINA PECTORIS, UNSPECIFIED VESSEL OR LESION TYPE (H): ICD-10-CM

## 2020-05-21 NOTE — TELEPHONE ENCOUNTER
Reason for Call:  FYI    Detailed comments: Patients PT had called wanting to update PCP of her B/P 190/57 after resting second reading was 182/58 - patient had also taken medication not to long before hand and .      Phone Number Patient can be reached at: Home number on file [phone] (home)    Best Time: Anytime    Can we leave a detailed message on this number? YES    Call taken on 5/21/2020 at 10:44 AM by Pam Roth

## 2020-05-21 NOTE — TELEPHONE ENCOUNTER
Spoke to Evangelina and she reports the form has been faxed 3 times and they have not received it back . Asked her to re fax it to att: Estella at [phone].     Line 2 will need the Dx.  Line 3 should say RN, PT, OT    It needs to be signed and faxed back to Att: Evangelina at fax [phone]    Estella Johansen RN on 5/21/2020 at 9:58 AM

## 2020-05-21 NOTE — TELEPHONE ENCOUNTER
The face to face form was received, filled out and provided to [facility]  for signature and faxing.    CYNTHIA ChavarriaN, RN  Care Coordinator  [facility] [place]

## 2020-05-21 NOTE — TELEPHONE ENCOUNTER
Evangelina from [facility] had called stating they need a Face 2 Face encounter form done. Because of COVID since patients aren't able to see their providers right now they are considering phone visits face 2 face.    Evangelina Tel:[phone]

## 2020-05-22 RX ORDER — METOPROLOL SUCCINATE 25 MG/1
50 TABLET, EXTENDED RELEASE ORAL DAILY
Qty: 90 TABLET | Refills: 1 | Status: SHIPPED | OUTPATIENT
Start: 2020-05-22 | End: 2020-07-21

## 2020-05-22 NOTE — TELEPHONE ENCOUNTER
Talked to patient reports BS in the 400's. Will increase Lantus to 50 units daily. Keep same dose of Novolog- 10 units twice per day.   Increase Metoprolol to 50 mg daily (2 tabs).  ART Oliver CNP    Addendum:   Called patient back but was not at home. Patient is at a friend's house.   BS at 3 pm- 385; took Novolog before she ate.   ART Oliver CNP

## 2020-05-26 NOTE — TELEPHONE ENCOUNTER
This medication list was reconciled and her refills were sent to the pharmacy. Spoke to patient and she reports she has everything she needs now.     Estella Johansen RN on 5/26/2020 at 2:22 PM

## 2020-05-28 ENCOUNTER — MEDICAL CORRESPONDENCE (OUTPATIENT)
Dept: HEALTH INFORMATION MANAGEMENT | Facility: CLINIC | Age: 60
End: 2020-05-28

## 2020-05-28 ENCOUNTER — TELEPHONE (OUTPATIENT)
Dept: FAMILY MEDICINE | Facility: CLINIC | Age: 60
End: 2020-05-28

## 2020-05-28 DIAGNOSIS — Z53.9 DIAGNOSIS NOT YET DEFINED: Primary | ICD-10-CM

## 2020-05-28 PROCEDURE — G0180 MD CERTIFICATION HHA PATIENT: HCPCS | Performed by: NURSE PRACTITIONER

## 2020-05-28 NOTE — TELEPHONE ENCOUNTER
Forms faxed to [facility] [phone]    Forms to abstract  .Stacy Sandifer,MA

## 2020-06-01 ENCOUNTER — OFFICE VISIT (OUTPATIENT)
Dept: FAMILY MEDICINE | Facility: CLINIC | Age: 60
End: 2020-06-01
Payer: COMMERCIAL

## 2020-06-01 ENCOUNTER — TELEPHONE (OUTPATIENT)
Dept: FAMILY MEDICINE | Facility: CLINIC | Age: 60
End: 2020-06-01

## 2020-06-01 ENCOUNTER — OFFICE VISIT (OUTPATIENT)
Dept: BEHAVIORAL HEALTH | Facility: CLINIC | Age: 60
End: 2020-06-01
Payer: COMMERCIAL

## 2020-06-01 VITALS
TEMPERATURE: 98.3 F | HEART RATE: 89 BPM | HEIGHT: 60 IN | BODY MASS INDEX: 46.09 KG/M2 | SYSTOLIC BLOOD PRESSURE: 122 MMHG | RESPIRATION RATE: 16 BRPM | OXYGEN SATURATION: 96 % | DIASTOLIC BLOOD PRESSURE: 60 MMHG

## 2020-06-01 DIAGNOSIS — F25.1 SCHIZOAFFECTIVE DISORDER, DEPRESSIVE TYPE (H): Primary | ICD-10-CM

## 2020-06-01 DIAGNOSIS — G89.29 CHRONIC RIGHT-SIDED LOW BACK PAIN WITHOUT SCIATICA: Primary | ICD-10-CM

## 2020-06-01 DIAGNOSIS — M54.50 CHRONIC RIGHT-SIDED LOW BACK PAIN WITHOUT SCIATICA: Primary | ICD-10-CM

## 2020-06-01 DIAGNOSIS — F25.1 SCHIZOAFFECTIVE DISORDER, DEPRESSIVE TYPE (H): ICD-10-CM

## 2020-06-01 DIAGNOSIS — N39.46 MIXED INCONTINENCE: ICD-10-CM

## 2020-06-01 DIAGNOSIS — Z79.4 TYPE 2 DIABETES MELLITUS WITH MILD NONPROLIFERATIVE RETINOPATHY WITHOUT MACULAR EDEMA, WITH LONG-TERM CURRENT USE OF INSULIN, UNSPECIFIED LATERALITY (H): ICD-10-CM

## 2020-06-01 DIAGNOSIS — R45.851 DEPRESSION WITH SUICIDAL IDEATION: ICD-10-CM

## 2020-06-01 DIAGNOSIS — F43.10 POSTTRAUMATIC STRESS DISORDER: ICD-10-CM

## 2020-06-01 DIAGNOSIS — E11.3299 TYPE 2 DIABETES MELLITUS WITH MILD NONPROLIFERATIVE RETINOPATHY WITHOUT MACULAR EDEMA, WITH LONG-TERM CURRENT USE OF INSULIN, UNSPECIFIED LATERALITY (H): ICD-10-CM

## 2020-06-01 DIAGNOSIS — Z79.4 TYPE 2 DIABETES MELLITUS WITH STAGE 3 CHRONIC KIDNEY DISEASE, WITH LONG-TERM CURRENT USE OF INSULIN (H): ICD-10-CM

## 2020-06-01 DIAGNOSIS — I10 HTN, GOAL BELOW 140/90: ICD-10-CM

## 2020-06-01 DIAGNOSIS — F32.A DEPRESSION WITH SUICIDAL IDEATION: ICD-10-CM

## 2020-06-01 DIAGNOSIS — N18.30 TYPE 2 DIABETES MELLITUS WITH STAGE 3 CHRONIC KIDNEY DISEASE, WITH LONG-TERM CURRENT USE OF INSULIN (H): ICD-10-CM

## 2020-06-01 DIAGNOSIS — G47.33 OSA (OBSTRUCTIVE SLEEP APNEA): ICD-10-CM

## 2020-06-01 DIAGNOSIS — E11.22 TYPE 2 DIABETES MELLITUS WITH STAGE 3 CHRONIC KIDNEY DISEASE, WITH LONG-TERM CURRENT USE OF INSULIN (H): ICD-10-CM

## 2020-06-01 PROCEDURE — 99215 OFFICE O/P EST HI 40 MIN: CPT | Performed by: NURSE PRACTITIONER

## 2020-06-01 PROCEDURE — 90832 PSYTX W PT 30 MINUTES: CPT | Performed by: SOCIAL WORKER

## 2020-06-01 RX ORDER — SENNOSIDES 8.6 MG
650 CAPSULE ORAL EVERY 8 HOURS PRN
Qty: 120 TABLET | Refills: 1 | Status: SHIPPED | OUTPATIENT
Start: 2020-06-01 | End: 2020-08-19

## 2020-06-01 ASSESSMENT — PAIN SCALES - GENERAL: PAINLEVEL: NO PAIN (0)

## 2020-06-01 NOTE — PROGRESS NOTES
[facility]   June 1, 2020  Behavioral Health Clinician Progress Note    Patient Name: Nena Tang           Service Type:  Individual      Service Location:   Face to Face in Clinic     Session Start Time: 1:25pm  Session End Time: 2:pm      Session Length: 16 - 37      Attendees: Patient    Visit Activities (Refresh list every visit): [facility]     Diagnostic Assessment Date: 1-23-18 Updated DA completed December 12, 2019  CGI date completed: 6-2-2020  Treatment Plan Review Date: 8-8-2020  See Flowsheets for today's PHQ-9 and TAMIA-7 results  Previous PHQ-9:   PHQ-9 SCORE 3/13/2018 10/26/2018 5/7/2019   PHQ-9 Total Score - - -   PHQ-9 Total Score - - -   PHQ-9 Total Score 14 20 22     Previous TAMIA-7:   TAMIA-7 SCORE 2/3/2017 3/13/2018 10/26/2018   Total Score - - -   Total Score 0 17 19   Total Score - - -       ALEXANDRA LEVEL:  ALEXANDRA Score (Last Two) 8/30/2011 6/9/2014   ALEXANDRA Raw Score 42 37   Activation Score 66 49.9   ALEXANDRA Level 3 2       DATA  Extended Session (60+ minutes): No  Interactive Complexity: No  Crisis: No  [facility] Patient: No    Treatment Objective(s) Addressed in This Session:  Target Behavior(s): disease management/lifestyle changes mental health    Depressed Mood: Increase interest, engagement, and pleasure in doing things  Decrease frequency and intensity of feeling down, depressed, hopeless  Improve quantity and quality of night time sleep / decrease daytime naps  Identify negative self-talk and behaviors: challenge core beliefs, myths, and actions  Improve concentration, focus, and mindfulness in daily activities   Decrease thoughts that you'd be better off dead or of suicide / self-harm  Anxiety: will experience a reduction in anxiety and will develop more effective coping skills to manage anxiety symptoms  Psychological distress related to Chronic Disease Management    Current Stressors / Issues:    The patient was seen by [facility] per the request of the PCP-regarding  sleeping she has difficulty with falling asleep and waking during the night-appetite and eating has been good-feeling stressed out with no suicidal thoughts-has been watching movies and watches TV a lot-she participates in PT 2 times a week-she takes walks in the hallway-regarding has been present and she is feeling it and this is annoying to the patient-not using the CPAP at night because she wants to wait until she move-she is planning to move to apartment with sister on Friday this week-no hallucinations and we talked about how she is responding better to these experiences (I am not looking for him)-not expecting to see him (self fulfilling prophecy)-she stated that she is feeling better and free now that she is living with her sister and not in the group home-her stressful situation of living and feeling overwhelmed and hopeless and like she can't control her circumstances-remember that you have done this before and that you can be patient and be able to find way through-      Progress on Treatment Objective(s) / Homework:  Minimal progress - ACTION (Actively working towards change); Intervened by reinforcing change plan / affirming steps taken    Motivational Interviewing    MI Intervention: Expressed Empathy/Understanding, Supported Autonomy, Collaboration, Evocation, Permission to raise concern or advise, Open-ended questions, Reflections: simple and complex, Rolled with resistance: Emphasized patient autonomy, Simple reflection and Evoked patient agenda and Change talk (evoked)     Change Talk Expressed by the Patient: Desire to change Reasons to change Need to change Activation Taking steps    Provider Response to Change Talk: E - Evoked more info from patient about behavior change, A - Affirmed patient's thoughts, decisions, or attempts at behavior change, R - Reflected patient's change talk and S - Summarized patient's change talk statements      Care Plan review completed: No    Medication Review:  No  changes to current psychiatric medication(s)     Medication Compliance:  NA    Changes in Health Issues:   None reported     Chemical Use Review:   Substance Use: Chemical use reviewed, no active concerns identified      Tobacco Use: No current tobacco use.      Assessment: Current Emotional / Mental Status (status of significant symptoms):  Risk status (Self / Other harm or suicidal ideation)  Patient has had a history of suicidal ideation: yes  Patient denies current fears or concerns for personal safety.  Patient denies current or recent suicidal ideation or behaviors.  Patient denies current or recent homicidal ideation or behaviors.  Patient denies current or recent self injurious behavior or ideation.  Patient denies other safety concerns.  A safety and risk management plan has not been developed at this time, however patient was encouraged to call [place] [phone] should there be a change in any of these risk factors.    Appearance:   Appropriate   Eye Contact:   Good   Psychomotor Behavior: Normal   Attitude:   Cooperative   Orientation:   All  Speech   Rate / Production: Normal    Volume:  Normal   Mood:    Normal alert and engaged   Affect:    Appropriate  Blunted  Worrisome   Thought Content:  Clear   Thought Form:  Coherent  Goal Directed  Logical   Insight:    Fair     Diagnoses:  1. Schizoaffective disorder, depressive type (H)    2. Posttraumatic stress disorder        Collateral Reports Completed:  Not Applicable    Plan: (Homework, other):  Patient was given information about behavioral services and encouraged to schedule a follow up appointment with the clinic [facility] as needed to work on helping the patient with management of mood states and to work on stress management around her grieving process-also to help the patient with her behavioral needs to comply with treatment medical recommendations.  She was also given information about mental health symptoms and treatment options .  JOHNNY  Recommendations: Maintain Sobriety.    Richardson Lopez, [facility], [facility]                                                    Treatment Plan    Client's Name: Nena Tang  YOB: 1960    Date: Reviewed/updated on 5-8-2020     DSM5 Diagnoses: (Sustained by DSM5 Criteria Listed Above)  Diagnoses:  295.70 (F25.0), Schizoaffective disorder, depressive type; 309.81 (F43.10) Posttraumatic Stress Disorder with panic specifier, history of chemical dependency issues (polysubstance dependence)  Psychosocial & Contextual Factors: Academics / Education - yes  Activities of Daily Living - yes  Financial management yes  Follow through with Medical recommendations - yes  Occupational / Vocational - yes  Social / Relational - yes, grief and loss  WHODAS Score: 30      Referral / Collaboration:  Referral to another professional/service is not indicated at this time..    Anticipated number of session or this episode of care: 20      MeasurableTreatment Goal(s) related to diagnosis / functional impairment(s)  Goal 1: Client will improve her ability to manage anxiety and mood states.     I will know I've met my goal when the man does not paralyze the me with fear.     Objective #A (Client Action)    Client will increase understanding of steps in the grief process.  Status: Continued - Date(s):  5-8-2020 the patient reported that she is no longer seeing the man (no move visual hallucination)-  Intervention(s)  Therapist will teach emotional recognition/identification. teach the navigation of grieving encouraging acceptance and adjustment.    Objective #B  Client will Decrease frequency and intensity of feeling down, depressed, hopeless  Decrease thoughts that you'd be better off dead or of suicide / self-harm.  Status: Continued - Date(s):   5-8-2020-Mood has been bad as her mood has been low and heavy-she has some frustration with how she was treated at her nursing home-sister has been support to get her out of house-she will  also participate in the OT-no suicidal thoughts and no hallucinations-with the medication she is sleeping better-she was encouraged to having some healthy distraction with moving attention to something that could promote peace and ronal-    Intervention(s)  Therapist will teach emotional regulation skills. with the use of mindful coping strategies and open communication of feelings and thoughts (getting it out to another person)    Client has reviewed and agreed to the above plan.      Richardson Lopez, [facility]  5-8-2020        ______________________________________________________________________

## 2020-06-01 NOTE — PROGRESS NOTES
SUBJECTIVE:                                                    Nena Tang is a 60 year old female who presents to clinic today for the following health issues:  [facility]: Don      Medication Refills: Refill on test strips requested today. Picked up more medications today.     Diabetes Follow-up  Checking at least twice per day. Am 115-200; before breakfast; night time: 200-300. Using 50 on Lantus and 10 on novolog.   How often are you checking your blood sugar? Two times daily  Blood sugar testing frequency justification:  Uncontrolled diabetes  What time of day are you checking your blood sugars (select all that apply)?  Before meals and At bedtime    What symptoms do you notice when your blood sugar is low?  None    What concerns do you have today about your diabetes? None and Blood sugar is often over 200     Do you have any of these symptoms? (Select all that apply)  Blurry vision      BP Readings from Last 2 Encounters:   06/01/20 122/60   05/15/20 (!) 160/80     Hemoglobin A1C (%)   Date Value   12/03/2019 6.2 (H)   08/06/2019 6.6 (H)     LDL Cholesterol Calculated (mg/dL)   Date Value   12/22/2019 86   08/06/2019 81               Hypertension Follow-up  Checking BP once per week when setting up medications.     Do you check your blood pressure regularly outside of the clinic? Yes ; weekly.     Are you following a low salt diet? Yes; trying to.     Are your blood pressures ever more than 140 on the top number (systolic) OR more   than 90 on the bottom number (diastolic), for example 140/90? No    Chronic Pain Follow-Up    Where in your body do you have pain? Her usual low back pain. Has been using her walker.     Which of these pain treatments have you tried since your last clinic visit? Physical Therapy  How well are you sleeping? Poor  How has your mood been since your last visit? Slightly worse  Have you had a significant life event? Other: Recent unrest.   Other aggravating factors: poor posture  Taking  medication as directed? Yes    PHQ-9 SCORE 3/13/2018 10/26/2018 5/7/2019   PHQ-9 Total Score - - -   PHQ-9 Total Score - - -   PHQ-9 Total Score 14 20 22     TAMIA-7 SCORE 2/3/2017 3/13/2018 10/26/2018   Total Score - - -   Total Score 0 17 19   Total Score - - -     No flowsheet data found.  Encounter-Level CSA:    There are no encounter-level csa.     Patient-Level CSA:    There are no patient-level csa.        Incontinence: Has been struggling with urine and stool incontinence. Having stomach issues in the morning.     Mental Health Follow up: Schizoaffective disorder,   Patient notes that her mind is not at piece. Denies any suicidal thoughts. Patient is watching TV a lot, get her mind off things going on. Patient reports that her anxiety is there but not crippling. Patient has been living with her sister and will be moving to a bigger place this weekend. Denies any hallucinations visual and auditory. Is using her walker.   Currently living with sister- feels better than she did before she went in to the hospital.     Status since last visit: increased stress.     See PHQ-9 for current symptoms.  Other associated symptoms: None    Complicating factors: struggling with   Significant life event:  No   Current substance abuse:  None  Anxiety or Panic symptoms:  No    Sleep - staying up but not falling asleep right away: watching movies. Feels like the trazodone is not helpful. Has not been using her CPAP at night, is currently not using it due to the space.   Appetite - fair. Getting about 2 meals per day.   Exercise - doing PT twice per week and switched to once per week. Walking hallways in her building.     PHQ-9  English PHQ-9   Any Language             Problems taking medications regularly: No    Medication side effects: none    Diet: low fat/cholesterol and diabetic    Social History     Tobacco Use     Smoking status: Never Smoker     Smokeless tobacco: Never Used   Substance Use Topics     Alcohol use: No      Frequency: Never     Comment: last month        Problem list and histories reviewed & adjusted, as indicated.  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 (H)     Illiterate     Irritable bowel syndrome     overweight - BMI >35     Takotsubo cardiomyopathy     CAD (coronary artery disease)     Restless legs syndrome (RLS)     CINDY (obstructive sleep apnea)- mild AHI 10.3     Verbal auditory hallucination     Chronic low back pain     Schizoaffective disorder, depressive type (H)     Migraine headache     HTN, goal below 140/90     Health Care Home     Lumbago     Cervicalgia     Cocaine abuse, episodic (H)     Suicidal ideation     Esophageal reflux     Mild nonproliferative diabetic retinopathy (H)     Tardive dyskinesia     Alcohol use     Left cataract     Falls frequently     History of uterine cancer     Psychophysiological insomnia     Dysuria     Asymptomatic postmenopausal status     Abdominal pain, right lower quadrant     Infectious encephalopathy     Non-intractable vomiting with nausea     Thoughts of self harm     Gastroenteritis     Posttraumatic stress disorder     Cervical cancer screening     Type 2 diabetes mellitus with stage 3 chronic kidney disease, with long-term current use of insulin (H)     Positive AIDA (antinuclear antibody)     Hyperglycemia     Pneumonia     Depression with suicidal ideation     Past Surgical History:   Procedure Laterality Date     C OOPHORECTORMY FOR RAHEL, W/BX  1983    UTERINE     CATARACT IOL, RT/LT Bilateral 2017     COLONOSCOPY N/A 3/16/2017    Procedure: COLONOSCOPY;  Surgeon: Traci Gonzalez MD;  Location: [facility]     Coronary CTA  5/21/2014     HYSTERECTOMY  1983    uterine cancer yearly pap's per provider.     LAPAROSCOPIC CHOLECYSTECTOMY        PHACOEMULSIFICATION CLEAR CORNEA WITH STANDARD INTRAOCULAR LENS IMPLANT Left 2017    Procedure: PHACOEMULSIFICATION CLEAR CORNEA WITH STANDARD INTRAOCULAR LENS IMPLANT;  LEFT EYE PHACOEMULSIFICATION CLEAR CORNEA WITH STANDARD INTRAOCULAR LENS IMPLANT ;  Surgeon: Tyra Diaz MD;  Location:  [facility]     PHACOEMULSIFICATION CLEAR CORNEA WITH STANDARD INTRAOCULAR LENS IMPLANT Right 2017    Procedure: PHACOEMULSIFICATION CLEAR CORNEA WITH STANDARD INTRAOCULAR LENS IMPLANT;  RIGHT EYE PHACOEMULSIFICATION CLEAR CORNEA WITH STANDARD INTRAOCULAR LENS IMPLANT;  Surgeon: Tyra Diaz MD;  Location:  [facility]     RELEASE TRIGGER FINGER  10/11/2012    Left thumb. Procedure: RELEASE TRIGGER FINGER;  LEFT THUMB TRIGGER RELEASE;  Surgeon: Tay Langley MD;  Location:  [facility]     RELEASE TRIGGER FINGER Right 2016    Procedure: RELEASE TRIGGER FINGER;  Surgeon: Albino Castañeda MD;  Location: [facility]       Social History     Tobacco Use     Smoking status: Never Smoker     Smokeless tobacco: Never Used   Substance Use Topics     Alcohol use: No     Frequency: Never     Comment: last month     Family History   Problem Relation Age of Onset     Cancer Mother         BLADDER     Respiratory Mother         COPD     Gastrointestinal Disease Mother         CIRRHOSIS OF LI BOLIVAR     Alcohol/Drug Mother      Diabetes Mother      Hypertension Mother      Lipids Mother      C.A.D. Mother      Glaucoma Mother      Alcohol/Drug Sister      Mental Illness Sister      Alcohol/Drug Sister      Psychotic Disorder Sister      Cancer Maternal Grandmother         UNKNOWN TYPE     Cancer Brother         COLON     Cancer - colorectal Brother         IN HIS LATE 30S     Alcohol/Drug Brother          OF HEROIN OVERDOSE AT AGE 22 YRS     Macular Degeneration No family hx of            Current Outpatient Medications   Medication Sig Dispense Refill     amantadine (SYMMETREL) 100 MG capsule Take 1 capsule (100 mg) by mouth 2  times daily 60 capsule 1     blood glucose (NO BRAND SPECIFIED) test strip Use to test blood sugar 4 times daily or as directed. 100 strip 11     blood glucose (NO BRAND SPECIFIED) test strip Use to test blood sugar 3 times daily or as directed. To accompany: Blood Glucose Monitor Brands: per insurance. 100 strip 6     blood glucose (NO BRAND SPECIFIED) test strip Use to test blood sugar  3 times daily or as directed. To accompany: Blood Glucose Monitor Brands: per insurance. 100 strip 0     blood glucose calibration (NO BRAND SPECIFIED) solution To accompany: Blood Glucose Monitor Brands: per insurance. 1 Bottle 3     blood glucose monitoring (NO BRAND SPECIFIED) meter device kit Use to test blood sugar 3 times daily or as directed. Preferred blood glucose meter OR supplies to accompany: Blood Glucose Monitor Brands: per insurance. 1 kit 0     clonazePAM (KLONOPIN) 0.5 MG tablet Take 1 tablet (0.5 mg) by mouth At Bedtime 30 tablet 0     famotidine (PEPCID) 20 MG tablet Take 1 tablet (20 mg) by mouth daily 30 tablet 1     fluticasone-salmeterol (ADVAIR) 250-50 MCG/DOSE inhaler Inhale 1 puff into the lungs every 12 hours as needed       furosemide (LASIX) 20 MG tablet Take 1 tablet (20 mg) by mouth daily 60 tablet 1     gabapentin (NEURONTIN) 300 MG capsule Take 1 capsule (300 mg) by mouth At Bedtime 30 capsule 1     hydrOXYzine (ATARAX) 25 MG tablet Take 1 tablet (25 mg) by mouth every 4 hours as needed for anxiety 60 tablet 1     insulin aspart (NOVOLOG FLEXPEN) 100 UNIT/ML pen Inject 10 units under the skin 3 times daily before meals as directed. 15 mL 1     insulin glargine (LANTUS PEN) 100 UNIT/ML pen Inject 50 Units Subcutaneous At Bedtime 15 mL 1     insulin pen needle (NOVOFINE 30) 30G X 8 MM miscellaneous USE 4 DAILY OR AS DIRECTED 400 each 0     ipratropium - albuterol 0.5 mg/2.5 mg/3 mL (DUONEB) 0.5-2.5 (3) MG/3ML neb solution Take 1 vial (3 mLs) by nebulization every 6 hours as needed for shortness of  breath / dyspnea or wheezing 30 vial 0     losartan (COZAAR) 100 MG tablet Take 1 tablet (100 mg) by mouth daily 30 tablet 1     metoprolol succinate ER (TOPROL-XL) 25 MG 24 hr tablet Take 2 tablets (50 mg) by mouth daily 90 tablet 1     miconazole (MICATIN/MICRO GUARD) 2 % external powder Apply topically once as needed       nystatin (MYCOSTATIN) 744729 UNIT/GM external cream Apply topically 2 times daily 30 g 3     order for DME Equipment being ordered: Depends. 30 each 1     order for DME Equipment being ordered: CPAP Supplies. 1 each 0     paliperidone ER (INVEGA) 9 MG 24 hr tablet Take 1 tablet (9 mg) by mouth At Bedtime 60 tablet 1     polyethylene glycol (MIRALAX/GLYCOLAX) packet Take 17 g by mouth daily as needed for constipation       senna-docusate (SENOKOT-S/PERICOLACE) 8.6-50 MG tablet Take 1 tablet by mouth 2 times daily as needed for constipation 60 tablet 1     sertraline (ZOLOFT) 100 MG tablet Take 2 tablets (200 mg) by mouth daily 60 tablet 3     thin (NO BRAND SPECIFIED) lancets Use with lanceting device. To accompany: Blood Glucose Monitor Brands: per insurance. 100 each 6     topiramate (TOPAMAX) 50 MG tablet Take 1 tablet (50 mg) by mouth At Bedtime 30 tablet 1     traZODone (DESYREL) 100 MG tablet Take 1 tablet (100 mg) by mouth nightly as needed for sleep 30 tablet 1     TRULICITY 1.5 MG/0.5ML pen Inject 1.5 mg Subcutaneous once a week Every Friday 4 mL 1     zinc oxide (DESITIN) 40 % external ointment Apply topically as needed for dry skin or irritation 56 g 0     alcohol swab prep pads Use to swab area of injection/rodolfo as directed. (Patient not taking: Reported on 6/1/2020) 100 each 3     aspirin (ASA) 81 MG EC tablet TAKE 1 TABLET (81MG) BY MOUTH DAILY (Patient not taking: Reported on 6/1/2020) 90 tablet 0     atorvastatin (LIPITOR) 80 MG tablet TAKE 1 TABLET (80MG) BY MOUTH DAILY (Patient not taking: Reported on 6/1/2020) 30 tablet 11     diclofenac (VOLTAREN) 1 % topical gel Place 4 g  onto the skin 4 times daily as needed for moderate pain (Patient not taking: Reported on 6/1/2020)       Allergies   Allergen Reactions     Imidazole Antifungals Hives     Tolerates diflucan     Ketoprofen Itching     Pruritis to topical     Lisinopril Hives     Metformin Other (See Comments)     Patient hospitalized for lactic acidosis - admitting provider suspectd caused by metformin     Metronidazole Hives     Posaconazole Hives     Tolerates diflucan     Recent Labs   Lab Test 05/15/20  1537 12/22/19  0651 12/03/19  1143  08/06/19  1043  05/24/19  0916  05/15/19  1822  08/06/18  1038   A1C  --   --  6.2*  --  6.6*  --   --   --  7.6*   < > 6.4*   LDL  --  86  --   --  81  --   --   --   --   --  84   HDL  --  42*  --   --  39*  --   --   --   --   --  46*   TRIG  --  132  --   --  131  --   --   --   --   --  215*   ALT 30 22 26   < >  --    < > 30  --  33   < >  --    CR 0.79 0.86 0.91   < >  --    < > 0.87   < > 0.93   < >  --    GFRESTIMATED 82 74 69   < >  --    < > 73   < > 67   < >  --    GFRESTBLACK >90 85 80   < >  --    < > 85   < > 78   < >  --    POTASSIUM 4.3 4.1 4.1   < >  --    < > 4.0   < > 4.3   < >  --    TSH  --  1.23  --   --   --   --  1.62  --   --    < >  --     < > = values in this interval not displayed.      BP Readings from Last 3 Encounters:   06/01/20 122/60   05/15/20 (!) 160/80   01/01/20 130/78    Wt Readings from Last 3 Encounters:   05/15/20 107 kg (236 lb)   01/02/20 106.5 kg (234 lb 14.4 oz)   12/20/19 110.5 kg (243 lb 8 oz)          Labs reviewed in EPIC  Problem list, Medication list, Allergies, and Medical/Social/Surgical histories reviewed in [facility] and updated as appropriate.      ROS: Constitutional, neuro, ENT, endocrine, pulmonary, cardiac, gastrointestinal, genitourinary, musculoskeletal, integument and psychiatric systems are negative, except as otherwise noted above in the HPI.       OBJECTIVE:                                                    /60   Pulse 89    Temp 98.3  F (36.8  C) (Temporal)   Resp 16   Ht 1.524 m (5')   LMP 01/06/2015   SpO2 96%   BMI 46.09 kg/m    Body mass index is 46.09 kg/m .    GENERAL: healthy, alert and no distress  EYES: Eyes grossly normal to inspection, PERRL and conjunctivae and sclerae normal  NECK: no adenopathy, no asymmetry, masses, or scars and thyroid normal to palpation  RESP: lungs clear to auscultation - no rales, rhonchi or wheezes  CV: regular rate and rhythm, normal S1 S2, no S3 or S4, no murmur, click or rub, no peripheral edema and peripheral pulses strong  ABDOMEN: soft, nontender and bowel sounds normal  MS: no gross musculoskeletal defects noted, no edema  NEURO: Normal strength and tone, mentation intact and speech normal  PSYCH: mentation appears normal and tearful    Mental Status Assessment:  Appearance:   Appropriate   Eye Contact:   Good   Psychomotor Behavior: Normal   Attitude:   Cooperative   Orientation:   All  Speech   Rate / Production: Normal    Volume:  Normal   Mood:    Normal  Affect:    Appropriate   Thought Content:  Clear   Thought Form:  Coherent  Logical   Insight:    Good   Attention Span and Concentration: appropriate  Recent and Remote Memory:  intact  Fund of Knowledge: appropriate  Muscle Strength and Tone: normal   Suicidal Ideation: reports no thoughts, no intention  Hallucination: No  Paranoid-No    See [facility] notes     Diagnostic Test Results:  none      ASSESSMENT/PLAN:                                                    (M54.5,  G89.29) Chronic right-sided low back pain without sciatica  (primary encounter diagnosis)  Comment: Patient reports some back pain, has been using her wheeled walker to help with stability and reducing falls risk.   Plan: acetaminophen (TYLENOL) 650 MG CR tablet        Continue working with PT and OT as recommended. Use tylenol as needed to help with the pain control.       (G47.33) CINDY (obstructive sleep apnea)- mild AHI 10.3  Comment: Patient reports that she has  not been using her CPAP because she has been sleeping on the couch at her sister's. They will be moving to a 2 bedroom apartment and she plans to set- up her CPAP once she has room to do so.   Plan: Restart use of CPAP as soon as possible.       (E11.22,  N18.3,  Z79.4) Type 2 diabetes mellitus with stage 3 chronic kidney disease, with long-term current use of insulin (H)  (E11.3299,  Z79.4) Type 2 diabetes mellitus with mild nonproliferative retinopathy without macular edema, with long-term current use of insulin, unspecified laterality (H)  Comment: Improving. Patient's diabetes has been improving slowly.  Lab Results   Component Value Date    A1C 6.2 12/03/2019    A1C 6.6 08/06/2019    A1C 7.6 05/15/2019    A1C 6.4 12/20/2018    A1C 6.4 08/06/2018     Plan: insulin glargine (LANTUS PEN) 100 UNIT/ML pen  -Increase Lantus to 30 units twice per day and continue with Novolog 10 units twice per day.       (I10) HTN, goal below 140/90  Comment: Patient denies any chest pain, shortness of breath, heart palpitations or syncopal episodes.   BP Readings from Last 3 Encounters:   06/01/20 122/60   05/15/20 (!) 160/80   01/01/20 130/78     Plan: continue with current medications with no changes.       (F25.1) Schizoaffective disorder, depressive type (H)  (F32.9,  R45.851) Depression with suicidal ideation  Comment: Patient reports that her mood has been ok although troubled with everything going on. Patient denies hallucinations or suicidal thoughts.   Plan: Continue with current psychiatry medications with no changes.   -Schedule a follow-up appointment with Dr. Brothers (psychiatry)      (N39.46) Mixed incontinence  Comment: Patient reports that she has been using depends because of incontinence issues. Patient reports both urine and some stool incontinence.   Plan: Will keep patient on depends for now and consider medication addition once she is established at her new home with her sister.         Patient Instructions    -Increase Lantus to 30 units twice per day. Keep Novolog the same.  -Call clinic with any questions or concerns.       I spent Greater than 40 minutes was spent face to face with Nena Tang, with greater than 50 % in counselling and consultation about Diabetes, Chronic pain, mental health, Hypertension, CINDY and Incontinence.       [facility]

## 2020-06-01 NOTE — PATIENT INSTRUCTIONS
-Increase Lantus to 30 units twice per day. Keep Novolog the same.  -Call clinic with any questions or concerns.

## 2020-06-01 NOTE — TELEPHONE ENCOUNTER
Admission  Orders  Faxed [phone] [facility] on 05/22/20.  Lio Wilder MA

## 2020-06-02 ENCOUNTER — MEDICAL CORRESPONDENCE (OUTPATIENT)
Dept: HEALTH INFORMATION MANAGEMENT | Facility: CLINIC | Age: 60
End: 2020-06-02

## 2020-06-04 ENCOUNTER — MEDICAL CORRESPONDENCE (OUTPATIENT)
Dept: HEALTH INFORMATION MANAGEMENT | Facility: CLINIC | Age: 60
End: 2020-06-04

## 2020-06-04 ENCOUNTER — TELEPHONE (OUTPATIENT)
Dept: FAMILY MEDICINE | Facility: CLINIC | Age: 60
End: 2020-06-04

## 2020-06-05 ENCOUNTER — MEDICAL CORRESPONDENCE (OUTPATIENT)
Dept: HEALTH INFORMATION MANAGEMENT | Facility: CLINIC | Age: 60
End: 2020-06-05

## 2020-06-05 ENCOUNTER — TELEPHONE (OUTPATIENT)
Dept: FAMILY MEDICINE | Facility: CLINIC | Age: 60
End: 2020-06-05

## 2020-06-05 NOTE — TELEPHONE ENCOUNTER
Physician Orders faxed to [facility] at [phone].  Lio Wilder MA

## 2020-06-08 ENCOUNTER — TELEPHONE (OUTPATIENT)
Dept: FAMILY MEDICINE | Facility: CLINIC | Age: 60
End: 2020-06-08

## 2020-06-08 NOTE — TELEPHONE ENCOUNTER
"Reason for Call:  Medication or medication refill:    Do you use a [facility]?  Name of the pharmacy and phone number for the current request:  [id number] [place], [place] - [address]  AT [facility] [address]  P: [phone]  F: [phone]    Name of the medication requested: One Touch Ultra Blue Tests (new) 100  Sig: Test four times daily  Prescribed Qty: 100  Last Refill: 12/16/2019    Other request: Per faxed request \"Second Prescription Refill Request.\" Medication isn't on the Current Med list so I did not T it up.        Fax entered on 6/8/2020 at 10:20 AM by Bertha Patel  "

## 2020-06-09 ENCOUNTER — TELEPHONE (OUTPATIENT)
Dept: FAMILY MEDICINE | Facility: CLINIC | Age: 60
End: 2020-06-09

## 2020-06-09 NOTE — TELEPHONE ENCOUNTER
Reason for Fax:  Form, our goal is to have forms completed with 72 hours, however, some forms may require a visit or additional information.    Type of letter, form or note:  Pull ups order form    Who is the form from?: [employer] (if other please explain)    Where did the form come from: form was faxed in    What clinic location was the form placed at?: [facility]    Where the form was placed: Rowena Haas's Box/Folder    What number is listed as a contact on the form?: Phone: [phone] Fax: [phone]       Additional comments: Please use this existing encounter to document when the completed form has been faxed back and when the fax confirmation has been verified through [employer].    Fax received on 6/9/2020 at 11:11 AM by Bertha Patel

## 2020-06-16 ENCOUNTER — MEDICAL CORRESPONDENCE (OUTPATIENT)
Dept: HEALTH INFORMATION MANAGEMENT | Facility: CLINIC | Age: 60
End: 2020-06-16

## 2020-06-23 ENCOUNTER — MEDICAL CORRESPONDENCE (OUTPATIENT)
Dept: HEALTH INFORMATION MANAGEMENT | Facility: CLINIC | Age: 60
End: 2020-06-23

## 2020-06-26 ENCOUNTER — TELEPHONE (OUTPATIENT)
Dept: FAMILY MEDICINE | Facility: CLINIC | Age: 60
End: 2020-06-26

## 2020-06-26 NOTE — TELEPHONE ENCOUNTER
Forms completed and faxed back to [facility]., also placed into abstraction to scan into patients chart.      Alysha Gonzalez MA

## 2020-06-29 ENCOUNTER — MEDICAL CORRESPONDENCE (OUTPATIENT)
Dept: HEALTH INFORMATION MANAGEMENT | Facility: CLINIC | Age: 60
End: 2020-06-29

## 2020-06-29 NOTE — ADDENDUM NOTE
Addended by: WILFRID ROME on: 9/28/2017 03:17 PM     Modules accepted: Orders     Right  foot  infection,  and oozing blood

## 2020-06-30 ENCOUNTER — OFFICE VISIT (OUTPATIENT)
Dept: BEHAVIORAL HEALTH | Facility: CLINIC | Age: 60
End: 2020-06-30
Payer: COMMERCIAL

## 2020-06-30 ENCOUNTER — OFFICE VISIT (OUTPATIENT)
Dept: FAMILY MEDICINE | Facility: CLINIC | Age: 60
End: 2020-06-30
Payer: COMMERCIAL

## 2020-06-30 ENCOUNTER — OFFICE VISIT (OUTPATIENT)
Dept: PHARMACY | Facility: CLINIC | Age: 60
End: 2020-06-30
Payer: COMMERCIAL

## 2020-06-30 VITALS
WEIGHT: 222.31 LBS | TEMPERATURE: 97.2 F | DIASTOLIC BLOOD PRESSURE: 64 MMHG | BODY MASS INDEX: 43.42 KG/M2 | SYSTOLIC BLOOD PRESSURE: 102 MMHG

## 2020-06-30 VITALS
TEMPERATURE: 97.2 F | DIASTOLIC BLOOD PRESSURE: 64 MMHG | BODY MASS INDEX: 43.42 KG/M2 | WEIGHT: 222.31 LBS | SYSTOLIC BLOOD PRESSURE: 102 MMHG

## 2020-06-30 DIAGNOSIS — E11.65 TYPE 2 DIABETES MELLITUS WITH HYPERGLYCEMIA, WITH LONG-TERM CURRENT USE OF INSULIN (H): Primary | ICD-10-CM

## 2020-06-30 DIAGNOSIS — I10 HTN, GOAL BELOW 140/90: Primary | ICD-10-CM

## 2020-06-30 DIAGNOSIS — F25.1 SCHIZOAFFECTIVE DISORDER, DEPRESSIVE TYPE (H): Primary | ICD-10-CM

## 2020-06-30 DIAGNOSIS — F25.1 SCHIZOAFFECTIVE DISORDER, DEPRESSIVE TYPE (H): ICD-10-CM

## 2020-06-30 DIAGNOSIS — Z79.4 TYPE 2 DIABETES MELLITUS WITH HYPERGLYCEMIA, WITH LONG-TERM CURRENT USE OF INSULIN (H): ICD-10-CM

## 2020-06-30 DIAGNOSIS — F25.0 SCHIZOAFFECTIVE DISORDER, BIPOLAR TYPE (H): ICD-10-CM

## 2020-06-30 DIAGNOSIS — F43.10 POSTTRAUMATIC STRESS DISORDER: ICD-10-CM

## 2020-06-30 DIAGNOSIS — Z79.4 TYPE 2 DIABETES MELLITUS WITH HYPERGLYCEMIA, WITH LONG-TERM CURRENT USE OF INSULIN (H): Primary | ICD-10-CM

## 2020-06-30 DIAGNOSIS — I10 HTN, GOAL BELOW 140/90: ICD-10-CM

## 2020-06-30 DIAGNOSIS — E11.65 TYPE 2 DIABETES MELLITUS WITH HYPERGLYCEMIA, WITH LONG-TERM CURRENT USE OF INSULIN (H): ICD-10-CM

## 2020-06-30 LAB — HBA1C MFR BLD: 8.6 % (ref 0–5.6)

## 2020-06-30 PROCEDURE — 99215 OFFICE O/P EST HI 40 MIN: CPT | Performed by: NURSE PRACTITIONER

## 2020-06-30 PROCEDURE — 80053 COMPREHEN METABOLIC PANEL: CPT | Performed by: NURSE PRACTITIONER

## 2020-06-30 PROCEDURE — 99606 MTMS BY PHARM EST 15 MIN: CPT | Performed by: PHARMACIST

## 2020-06-30 PROCEDURE — 36415 COLL VENOUS BLD VENIPUNCTURE: CPT | Performed by: NURSE PRACTITIONER

## 2020-06-30 PROCEDURE — 99607 MTMS BY PHARM ADDL 15 MIN: CPT | Performed by: PHARMACIST

## 2020-06-30 PROCEDURE — 90832 PSYTX W PT 30 MINUTES: CPT | Performed by: SOCIAL WORKER

## 2020-06-30 PROCEDURE — 83036 HEMOGLOBIN GLYCOSYLATED A1C: CPT | Performed by: NURSE PRACTITIONER

## 2020-06-30 RX ORDER — INSULIN ASPART 100 [IU]/ML
INJECTION, SOLUTION INTRAVENOUS; SUBCUTANEOUS
Qty: 15 ML | Refills: 1 | Status: SHIPPED | OUTPATIENT
Start: 2020-06-30 | End: 2020-08-20

## 2020-06-30 RX ORDER — CLONAZEPAM 0.5 MG/1
0.5 TABLET ORAL AT BEDTIME
Qty: 30 TABLET | Refills: 0 | Status: SHIPPED | OUTPATIENT
Start: 2020-06-30 | End: 2020-08-03

## 2020-06-30 NOTE — PROGRESS NOTES
MTM ENCOUNTER  SUBJECTIVE/OBJECTIVE:                           Nena Tang is a 60 year old female coming in for a follow-up visit. She was referred to me from Rowena Haas. Today's visit is a co-visit with PCP. Today's visit is a follow-up MTM visit from 5/20/20.     Chief Complaint: hyperglycemia.    Tobacco:  reports that she has never smoked. She has never used smokeless tobacco.  Alcohol: not currently using    Medication Adherence/Access: issues reported and discussed below.  Medications are now set up in a pill box by HCRN - will be switching to a med machine to remind her to take doses. Unable to quantify missed doses.    Diabetes: Pt currently taking lantus 30u BID, Novolog 10u BID, Trulicity 1.5mg weekly. Pt is not experiencing side effects.    SMBG: 3 times daily. Her sister is helping to track glucose but no longer writing down the numbers.    Ranges (pt reported):   Fasting 140-170  Patient is not experiencing hypoglycemia.  Recent symptoms of high blood sugar? none  Eye exam: up to date  Foot exam: up to date  ACEi/ARB: No.   Urine Albumin:         Lab Results   Component Value Date     UMALCR 4.47 05/24/2019      Aspirin: Taking 81mg daily and denies side effects  Diet/Exercise: eating twice a day.   Lab Results   Component Value Date    A1C 6.2 12/03/2019    A1C 6.6 08/06/2019    A1C 7.6 05/15/2019    A1C 6.4 12/20/2018    A1C 6.4 08/06/2018     Schizoaffective: Currently taking sertraline 200mg daily, Invega 9mg daily, amantadine 100mg BID, trazodone 100mg HS. Based on refill history, does not appear to be taking clonazepam x2 weeks.   Reports she has been down. Tearful throughout the visit. Not sleeping well last 2 weeks with having her 's ashes in her closet, having nightmares about him.   She is not having hallucinations of the man at night. She is frustrated with her sister nagging her about taking pills and checking glucose.   No suicidal thoughts.  See [facility] notes for details.  "    Hypertension: Current medications include losartan 100mg daily, metoprolol XL 50mg daily, furosemide 20mg daily.  Home care RN monitors BP and pt reports as variable, \"high\" a couple days ago.  Patient reports the following medication side effects: dizziness at times, with position changes. Edema in her ankles and feet have improved.      Today's Vitals: /64   Temp 97.2  F (36.2  C) (Temporal)   Wt 222 lb 5 oz (100.8 kg)   LMP 01/06/2015   BMI 43.42 kg/m        ASSESSMENT:                              Medication Adherence: fair, needs improvement - see below    Diabetes: needs improvement. A1c at goal <7% at last check but suspect increased; due for recheck.  Pt would benefit from increasing Novolog with meals; also education provided on bolus dose of 10u with sweets.     Schizoaffective: needs improvement. Appears to be missing clonazepam, will restart. [facility] will monitor closely.     Hypertension: stable. BP at goal <140/90. Will monitor for hypotension and consider holding furosemide if needed.      PLAN:                            1. Increase Novolog to 14u with meals. Give additional 10u with sweets.   2. Recheck A1c, BMP  3. Restart clonazepam     I spent 45 minutes with this patient today. All changes were made via collaborative practice agreement with Rowena Haas. A copy of the visit note was provided to the patient's primary care provider.    Will follow up in 2-4 weeks.    The patient was given a summary of these recommendations. See Provider note/AVS from today.     Eugenia De Jesus, KiD, BCACP   "

## 2020-06-30 NOTE — PATIENT INSTRUCTIONS
-Increase Novolog to 14 units three times per day. Use 10 units of Novolog with a sweet treat.   -Medication refill done.   -Bring blood glucose meter with you at the net appointment.   -Call clinic with questions or concerns.

## 2020-06-30 NOTE — PROGRESS NOTES
[facility]   June 30, 2020  Behavioral Health Clinician Progress Note    Patient Name: Nena Tang           Service Type:  Individual      Service Location:   Face to Face in Clinic     Session Start Time: 3:45pm  Session End Time: 4:11pm      Session Length: 16 - 37      Attendees: Patient    Visit Activities (Refresh list every visit): [facility] [employer]     Diagnostic Assessment Date: 1-23-18 Updated DA completed December 12, 2019  CGI date completed: 6-2-2020  Treatment Plan Review Date: 8-8-2020  See Flowsheets for today's PHQ-9 and TAMIA-7 results  Previous PHQ-9:   PHQ-9 SCORE 3/13/2018 10/26/2018 5/7/2019   PHQ-9 Total Score - - -   PHQ-9 Total Score - - -   PHQ-9 Total Score 14 20 22     Previous TAMIA-7:   TAMIA-7 SCORE 2/3/2017 3/13/2018 10/26/2018   Total Score - - -   Total Score 0 17 19   Total Score - - -       ALEXANDRA LEVEL:  ALEXANDRA Score (Last Two) 8/30/2011 6/9/2014   ALEXANDRA Raw Score 42 37   Activation Score 66 49.9   ALEXANDRA Level 3 2       DATA  Extended Session (60+ minutes): No  Interactive Complexity: No  Crisis: No  [facility] Patient: No    Treatment Objective(s) Addressed in This Session:  Target Behavior(s): disease management/lifestyle changes mental health    Depressed Mood: Increase interest, engagement, and pleasure in doing things  Decrease frequency and intensity of feeling down, depressed, hopeless  Improve quantity and quality of night time sleep / decrease daytime naps  Identify negative self-talk and behaviors: challenge core beliefs, myths, and actions  Improve concentration, focus, and mindfulness in daily activities   Feel less fidgety, restless or slow in daily activities / interpersonal interactions  Anxiety: will experience a reduction in anxiety and will develop more effective coping skills to manage anxiety symptoms  Thought Disturbance: will develop skills to more effectively manage symptoms and will continue to take medications as prescribed  Psychological  distress related to Diabetes  Psychological distress related to Sleep Disturbance    Current Stressors / Issues:    The patient was seen by [facility] [place] per the request of the PCP-she stated that her blood sugars could be better and she could help with eating better less cabs and less sugars-sister has been helping the patient with managing her health conditions-regarding sleeping the patient reported having hard time falling asleep-having nightmares-no sights of the man-has a nurse that fills her medications once a week-she is planning to get the machine that will pump out the medications-dreaming about her  and other things that are stressful-regarding her mood the has been feeling down and she has not been doing things to raise her mood-not doing anything that would break-no thoughts to want to harm self-she moved into new place first of June and she lives with her sister in 2 bedroom house-she has in home nurse-she has her husbands ashes in her closes and she believes this is bringing about the nightmares and she talked about planning to pore his ashes in the lack because her  likes fishing-the patient was emotional during the visit with tears-        Progress on Treatment Objective(s) / Homework:  Minimal progress - PREPARATION (Decided to change - considering how); Intervened by negotiating a change plan and determining options / strategies for behavior change, identifying triggers, exploring social supports, and working towards setting a date to begin behavior change    Motivational Interviewing    MI Intervention: Expressed Empathy/Understanding, Supported Autonomy, Collaboration, Evocation, Permission to raise concern or advise, Open-ended questions, Reflections: simple and complex, Rolled with resistance: Emphasized patient autonomy, Simple reflection and Evoked patient agenda and Change talk (evoked)     Change Talk Expressed by the Patient: Desire to change Reasons to change Need to change  Committment to change Activation Taking steps    Provider Response to Change Talk: E - Evoked more info from patient about behavior change, A - Affirmed patient's thoughts, decisions, or attempts at behavior change, R - Reflected patient's change talk and S - Summarized patient's change talk statements      Care Plan review completed: No    Medication Review:  No changes to current psychiatric medication(s)     Medication Compliance:  NA    Changes in Health Issues:   None reported     Chemical Use Review:   Substance Use: Chemical use reviewed, no active concerns identified      Tobacco Use: No current tobacco use.      Assessment: Current Emotional / Mental Status (status of significant symptoms):  Risk status (Self / Other harm or suicidal ideation)  Patient has had a history of suicidal ideation: yes  Patient denies current fears or concerns for personal safety.  Patient denies current or recent suicidal ideation or behaviors.  Patient denies current or recent homicidal ideation or behaviors.  Patient denies current or recent self injurious behavior or ideation.  Patient denies other safety concerns.  A safety and risk management plan has not been developed at this time, however patient was encouraged to call [place] [phone] should there be a change in any of these risk factors.    Appearance:   Appropriate   Eye Contact:   Good   Psychomotor Behavior: Normal   Attitude:   Cooperative   Orientation:   All  Speech   Rate / Production: Normal    Volume:  Normal   Mood:    Anxious  Normal Sad  alert and engaged   Affect:    Appropriate  Blunted  Worrisome   Thought Content:  Clear   Thought Form:  Coherent  Goal Directed  Logical   Insight:    Fair     Diagnoses:  1. Schizoaffective disorder, depressive type (H)    2. Posttraumatic stress disorder        Collateral Reports Completed:  Not Applicable    Plan: (Homework, other):  Patient was given information about behavioral services and encouraged to schedule a  follow up appointment with the clinic [facility] as needed to work on helping the patient with management of mood states and to work on stress management around her grieving process-also to help the patient with her behavioral needs to comply with treatment medical recommendations.  She was also given information about mental health symptoms and treatment options .  CD Recommendations: Maintain Sobriety.    Richardson Lopez, [facility], [facility]                                                    Treatment Plan    Client's Name: Nena Tang  YOB: 1960    Date: Reviewed/updated on 5-8-2020     DSM5 Diagnoses: (Sustained by DSM5 Criteria Listed Above)  Diagnoses:  295.70 (F25.0), Schizoaffective disorder, depressive type; 309.81 (F43.10) Posttraumatic Stress Disorder with panic specifier, history of chemical dependency issues (polysubstance dependence)  Psychosocial & Contextual Factors: Academics / Education - yes  Activities of Daily Living - yes  Financial management yes  Follow through with Medical recommendations - yes  Occupational / Vocational - yes  Social / Relational - yes, grief and loss  WHODAS Score: 30      Referral / Collaboration:  Referral to another professional/service is not indicated at this time..    Anticipated number of session or this episode of care: 20      MeasurableTreatment Goal(s) related to diagnosis / functional impairment(s)  Goal 1: Client will improve her ability to manage anxiety and mood states.     I will know I've met my goal when the man does not paralyze the me with fear.     Objective #A (Client Action)    Client will increase understanding of steps in the grief process.  Status: Continued - Date(s):  5-8-2020 the patient reported that she is no longer seeing the man (no move visual hallucination)-  Intervention(s)  Therapist will teach emotional recognition/identification. teach the navigation of grieving encouraging acceptance and adjustment.    Objective #B  Client will  Decrease frequency and intensity of feeling down, depressed, hopeless  Decrease thoughts that you'd be better off dead or of suicide / self-harm.  Status: Continued - Date(s):   5-8-2020-Mood has been bad as her mood has been low and heavy-she has some frustration with how she was treated at her nursing home-sister has been support to get her out of house-she will also participate in the OT-no suicidal thoughts and no hallucinations-with the medication she is sleeping better-she was encouraged to having some healthy distraction with moving attention to something that could promote peace and ronal-    Intervention(s)  Therapist will teach emotional regulation skills. with the use of mindful coping strategies and open communication of feelings and thoughts (getting it out to another person)    Client has reviewed and agreed to the above plan.      Richardson Lopez, [facility]  5-8-2020        ______________________________________________________________________

## 2020-06-30 NOTE — PROGRESS NOTES
SUBJECTIVE:                                                    Nena Tang is a 60 year old female who presents to clinic today for the following health issues:  [facility]: [facility]    Diabetes Follow-up  Patient states that her blood sugars have been up last night and today. Readings 140-170 in the morning last week. Last night blood sugar was 416, had not taken her medications yet. Reports lower blood sugars with the lantus. States that she is taking her Lantus 30 units twice per day and then novolog twice per day with meals. Restarted her Trulicity.     How often are you checking your blood sugar? at least three times per.     What concerns do you have today about your diabetes? Blood sugars often over 200 in the evening and Other: trying to eat right.    BP Readings from Last 2 Encounters:   06/30/20 102/64   06/30/20 102/64     Hemoglobin A1C (%)   Date Value   12/03/2019 6.2 (H)   08/06/2019 6.6 (H)     LDL Cholesterol Calculated (mg/dL)   Date Value   12/22/2019 86   08/06/2019 81       Hypertension Follow-up  Patient denies any chest pain, shortness of breath. Reports some dizziness. Encouraged proper hydration.     Do you check your blood pressure regularly outside of the clinic? Yes, weekly with the RN.     Are you following a low salt diet? No    Are your blood pressures ever more than 140 on the top number (systolic) OR more   than 90 on the bottom number (diastolic), for example 140/90? No      Mental Health Follow up: Depression.   Patient states that she moved into her new apartment with her sister at the start of June. States that her sister nags her all the time. Patient reports that she has been forgetting to take her medications and now will be getting a med machine to help with reminding her about her medications. States that she knows that her sister means her well.   Reports that she has been having nightmares. States that it could be because her 's ashes are in her closet. Worked with Richardson  ([facility]) about putting her  to rest.   States that her mood has been pretty down, very tearful today. Patient denies any suicidal thoughts.   Patient is very emotional today- does not report anything particularly that is making her so.     Status since last visit: increase in insomnia.     See PHQ-9 for current symptoms.  Other associated symptoms: None    Complicating factors: missing some of her medications. Will be getting a med machine.   Significant life event:  No   Current substance abuse:  None  Anxiety or Panic symptoms:  No    Sleep - not good. States that the trazodone has not been helpful. Notes that she has been getting nightmares.   Appetite - good.   Exercise - only with PT.      PHQ-9  English PHQ-9   Any Language               Problems taking medications regularly: Yes,  problems remembering to take    Medication side effects: none    Diet: low fat/cholesterol and diabetic    Social History     Tobacco Use     Smoking status: Never Smoker     Smokeless tobacco: Never Used   Substance Use Topics     Alcohol use: No     Frequency: Never     Comment: last month        Problem list and histories reviewed & adjusted, as indicated.  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 (H)     Illiterate     Irritable bowel syndrome     overweight - BMI >35     Takotsubo cardiomyopathy     CAD (coronary artery disease)     Restless legs syndrome (RLS)     CINDY (obstructive sleep apnea)- mild AHI 10.3     Verbal auditory hallucination     Chronic low back pain     Schizoaffective disorder, depressive type (H)     Migraine headache     HTN, goal below 140/90     Health Care Home     Lumbago     Cervicalgia     Cocaine abuse, episodic (H)     Suicidal ideation     Esophageal reflux     Mild  nonproliferative diabetic retinopathy (H)     Tardive dyskinesia     Alcohol use     Left cataract     Falls frequently     History of uterine cancer     Psychophysiological insomnia     Dysuria     Asymptomatic postmenopausal status     Abdominal pain, right lower quadrant     Infectious encephalopathy     Non-intractable vomiting with nausea     Thoughts of self harm     Gastroenteritis     Posttraumatic stress disorder     Cervical cancer screening     Type 2 diabetes mellitus with stage 3 chronic kidney disease, with long-term current use of insulin (H)     Positive AIDA (antinuclear antibody)     Hyperglycemia     Pneumonia     Depression with suicidal ideation     Mixed incontinence     Past Surgical History:   Procedure Laterality Date     C OOPHORECTORMY FOR MALIG, W/BX  1983    UTERINE     CATARACT IOL, RT/LT Bilateral 2017     COLONOSCOPY N/A 3/16/2017    Procedure: COLONOSCOPY;  Surgeon: Traci Gonzalez MD;  Location:  GI     Coronary CTA  5/21/2014     HYSTERECTOMY  1983    uterine cancer yearly pap's per provider.     LAPAROSCOPIC CHOLECYSTECTOMY  2008     PHACOEMULSIFICATION CLEAR CORNEA WITH STANDARD INTRAOCULAR LENS IMPLANT Left 5/5/2017    Procedure: PHACOEMULSIFICATION CLEAR CORNEA WITH STANDARD INTRAOCULAR LENS IMPLANT;  LEFT EYE PHACOEMULSIFICATION CLEAR CORNEA WITH STANDARD INTRAOCULAR LENS IMPLANT ;  Surgeon: Tyra Diaz MD;  Location:  [facility]     PHACOEMULSIFICATION CLEAR CORNEA WITH STANDARD INTRAOCULAR LENS IMPLANT Right 6/30/2017    Procedure: PHACOEMULSIFICATION CLEAR CORNEA WITH STANDARD INTRAOCULAR LENS IMPLANT;  RIGHT EYE PHACOEMULSIFICATION CLEAR CORNEA WITH STANDARD INTRAOCULAR LENS IMPLANT;  Surgeon: Tyra Diaz MD;  Location:  [facility]     RELEASE TRIGGER FINGER  10/11/2012    Left thumb. Procedure: RELEASE TRIGGER FINGER;  LEFT THUMB TRIGGER RELEASE;  Surgeon: Tay Langley MD;  Location:  [facility]     RELEASE TRIGGER FINGER Right 12/26/2016    Procedure: RELEASE  TRIGGER FINGER;  Surgeon: Albino Castañeda MD;  Location: [facility] OR       Social History     Tobacco Use     Smoking status: Never Smoker     Smokeless tobacco: Never Used   Substance Use Topics     Alcohol use: No     Frequency: Never     Comment: last month     Family History   Problem Relation Age of Onset     Cancer Mother         BLADDER     Respiratory Mother         COPD     Gastrointestinal Disease Mother         CIRRHOSIS OF LI BOLIVAR     Alcohol/Drug Mother      Diabetes Mother      Hypertension Mother      Lipids Mother      C.A.D. Mother      Glaucoma Mother      Alcohol/Drug Sister      Mental Illness Sister      Alcohol/Drug Sister      Psychotic Disorder Sister      Cancer Maternal Grandmother         UNKNOWN TYPE     Cancer Brother         COLON     Cancer - colorectal Brother         IN HIS LATE 30S     Alcohol/Drug Brother          OF HEROIN OVERDOSE AT AGE 22 YRS     Macular Degeneration No family hx of            Current Outpatient Medications   Medication Sig Dispense Refill     acetaminophen (TYLENOL) 650 MG CR tablet Take 1 tablet (650 mg) by mouth every 8 hours as needed for mild pain or fever 120 tablet 1     alcohol swab prep pads Use to swab area of injection/rodolfo as directed. (Patient not taking: Reported on 2020) 100 each 3     amantadine (SYMMETREL) 100 MG capsule Take 1 capsule (100 mg) by mouth 2 times daily 60 capsule 1     aspirin (ASA) 81 MG EC tablet TAKE 1 TABLET (81MG) BY MOUTH DAILY (Patient not taking: Reported on 2020) 90 tablet 0     atorvastatin (LIPITOR) 80 MG tablet TAKE 1 TABLET (80MG) BY MOUTH DAILY (Patient not taking: Reported on 2020) 30 tablet 11     blood glucose (NO BRAND SPECIFIED) test strip Use to test blood sugar 4 times daily or as directed. 100 strip 11     blood glucose (NO BRAND SPECIFIED) test strip Use to test blood sugar 3 times daily or as directed. To accompany: Blood Glucose Monitor Brands: per insurance. 100 strip 6     blood  glucose calibration (NO BRAND SPECIFIED) solution To accompany: Blood Glucose Monitor Brands: per insurance. 1 Bottle 3     blood glucose monitoring (NO BRAND SPECIFIED) meter device kit Use to test blood sugar 3 times daily or as directed. Preferred blood glucose meter OR supplies to accompany: Blood Glucose Monitor Brands: per insurance. 1 kit 0     clonazePAM (KLONOPIN) 0.5 MG tablet Take 1 tablet (0.5 mg) by mouth At Bedtime 30 tablet 0     diclofenac (VOLTAREN) 1 % topical gel Place 4 g onto the skin 4 times daily as needed for moderate pain (Patient not taking: Reported on 6/1/2020)       famotidine (PEPCID) 20 MG tablet Take 1 tablet (20 mg) by mouth daily 30 tablet 1     fluticasone-salmeterol (ADVAIR) 250-50 MCG/DOSE inhaler Inhale 1 puff into the lungs every 12 hours as needed       furosemide (LASIX) 20 MG tablet Take 1 tablet (20 mg) by mouth daily 60 tablet 1     gabapentin (NEURONTIN) 300 MG capsule Take 1 capsule (300 mg) by mouth At Bedtime 30 capsule 1     hydrOXYzine (ATARAX) 25 MG tablet Take 1 tablet (25 mg) by mouth every 4 hours as needed for anxiety 60 tablet 1     insulin aspart (NOVOLOG FLEXPEN) 100 UNIT/ML pen Inject 10 units under the skin 3 times daily before meals as directed. 15 mL 1     insulin glargine (LANTUS PEN) 100 UNIT/ML pen Inject 30 Units Subcutaneous 2 times daily 21 mL 3     insulin pen needle (NOVOFINE 30) 30G X 8 MM miscellaneous USE 4 DAILY OR AS DIRECTED 400 each 0     ipratropium - albuterol 0.5 mg/2.5 mg/3 mL (DUONEB) 0.5-2.5 (3) MG/3ML neb solution Take 1 vial (3 mLs) by nebulization every 6 hours as needed for shortness of breath / dyspnea or wheezing 30 vial 0     losartan (COZAAR) 100 MG tablet Take 1 tablet (100 mg) by mouth daily 30 tablet 1     metoprolol succinate ER (TOPROL-XL) 25 MG 24 hr tablet Take 2 tablets (50 mg) by mouth daily 90 tablet 1     miconazole (MICATIN/MICRO GUARD) 2 % external powder Apply topically once as needed       nystatin (MYCOSTATIN)  798607 UNIT/GM external cream Apply topically 2 times daily 30 g 3     order for DME Equipment being ordered: Depends. 30 each 1     order for DME Equipment being ordered: CPAP Supplies. 1 each 0     paliperidone ER (INVEGA) 9 MG 24 hr tablet Take 1 tablet (9 mg) by mouth At Bedtime 60 tablet 1     senna-docusate (SENOKOT-S/PERICOLACE) 8.6-50 MG tablet Take 1 tablet by mouth 2 times daily as needed for constipation 60 tablet 1     sertraline (ZOLOFT) 100 MG tablet Take 2 tablets (200 mg) by mouth daily 60 tablet 3     thin (NO BRAND SPECIFIED) lancets Use with lanceting device. To accompany: Blood Glucose Monitor Brands: per insurance. 100 each 6     topiramate (TOPAMAX) 50 MG tablet Take 1 tablet (50 mg) by mouth At Bedtime 30 tablet 1     traZODone (DESYREL) 100 MG tablet Take 1 tablet (100 mg) by mouth nightly as needed for sleep 30 tablet 1     TRULICITY 1.5 MG/0.5ML pen Inject 1.5 mg Subcutaneous once a week Every Friday 4 mL 1     zinc oxide (DESITIN) 40 % external ointment Apply topically as needed for dry skin or irritation 56 g 0     Allergies   Allergen Reactions     Imidazole Antifungals Hives     Tolerates diflucan     Ketoprofen Itching     Pruritis to topical     Lisinopril Hives     Metformin Other (See Comments)     Patient hospitalized for lactic acidosis - admitting provider suspectd caused by metformin     Metronidazole Hives     Posaconazole Hives     Tolerates diflucan     Recent Labs   Lab Test 05/15/20  1537 12/22/19  0651 12/03/19  1143  08/06/19  1043  05/24/19  0916  05/15/19  1822  08/06/18  1038   A1C  --   --  6.2*  --  6.6*  --   --   --  7.6*   < > 6.4*   LDL  --  86  --   --  81  --   --   --   --   --  84   HDL  --  42*  --   --  39*  --   --   --   --   --  46*   TRIG  --  132  --   --  131  --   --   --   --   --  215*   ALT 30 22 26   < >  --    < > 30  --  33   < >  --    CR 0.79 0.86 0.91   < >  --    < > 0.87   < > 0.93   < >  --    GFRESTIMATED 82 74 69   < >  --    < > 73   <  > 67   < >  --    GFRESTBLACK >90 85 80   < >  --    < > 85   < > 78   < >  --    POTASSIUM 4.3 4.1 4.1   < >  --    < > 4.0   < > 4.3   < >  --    TSH  --  1.23  --   --   --   --  1.62  --   --    < >  --     < > = values in this interval not displayed.      BP Readings from Last 3 Encounters:   06/30/20 102/64   06/30/20 102/64   06/01/20 122/60    Wt Readings from Last 3 Encounters:   06/30/20 100.8 kg (222 lb 5 oz)   06/30/20 100.8 kg (222 lb 5 oz)   05/15/20 107 kg (236 lb)          Labs reviewed in EPIC  Problem list, Medication list, Allergies, and Medical/Social/Surgical histories reviewed in [facility] and updated as appropriate.      ROS: Constitutional, neuro, ENT, endocrine, pulmonary, cardiac, gastrointestinal, genitourinary, musculoskeletal, integument and psychiatric systems are negative, except as otherwise noted above in the HPI.     OBJECTIVE:                                                    /64   Temp 97.2  F (36.2  C) (Temporal)   Wt 100.8 kg (222 lb 5 oz)   LMP 01/06/2015   Breastfeeding No   BMI 43.42 kg/m    Body mass index is 43.42 kg/m .    GENERAL: healthy, alert and no distress  EYES: Eyes grossly normal to inspection, PERRL and conjunctivae and sclerae normal  NECK: no adenopathy, no asymmetry, masses, or scars and thyroid normal to palpation  RESP: lungs clear to auscultation - no rales, rhonchi or wheezes  CV: regular rate and rhythm, normal S1 S2, no S3 or S4, no murmur, click or rub, no peripheral edema and peripheral pulses strong  ABDOMEN: soft, nontender and bowel sounds normal  MS: no gross musculoskeletal defects noted, no edema  NEURO: Normal strength and tone, mentation intact and speech normal  PSYCH: mentation appears normal and tearful    Mental Status Assessment:  Appearance:   Appropriate   Eye Contact:   Good   Psychomotor Behavior: Normal  Restless   Attitude:   Cooperative   Orientation:   All  Speech   Rate / Production: Slow  Normal    Volume:  Normal    Mood:    Normal Sad   Affect:    Appropriate  Tearful  Thought Content:  Clear   Thought Form:  Coherent  Logical   Insight:    Fair   Attention Span and Concentration: appropriate.   Recent and Remote Memory:  intact  Fund of Knowledge: appropriate  Muscle Strength and Tone: normal   Suicidal Ideation: reports no thoughts, no intention  Hallucination: No  Self harm-No      See [facility] notes     Diagnostic Test Results:  Results for orders placed or performed in visit on 06/30/20   Comprehensive metabolic panel (BMP + Alb, Alk Phos, ALT, AST, Total. Bili, TP)     Status: Abnormal   Result Value Ref Range    Sodium 133 133 - 144 mmol/L    Potassium 4.3 3.4 - 5.3 mmol/L    Chloride 104 94 - 109 mmol/L    Carbon Dioxide 23 20 - 32 mmol/L    Anion Gap 6 3 - 14 mmol/L    Glucose 225 (H) 70 - 99 mg/dL    Urea Nitrogen 14 7 - 30 mg/dL    Creatinine 0.98 0.52 - 1.04 mg/dL    GFR Estimate 63 >60 mL/min/[1.73_m2]    GFR Estimate If Black 73 >60 mL/min/[1.73_m2]    Calcium 9.1 8.5 - 10.1 mg/dL    Bilirubin Total 0.3 0.2 - 1.3 mg/dL    Albumin 3.7 3.4 - 5.0 g/dL    Protein Total 8.0 6.8 - 8.8 g/dL    Alkaline Phosphatase 101 40 - 150 U/L    ALT 35 0 - 50 U/L    AST 23 0 - 45 U/L   **A1C FUTURE anytime     Status: Abnormal   Result Value Ref Range    Hemoglobin A1C 8.6 (H) 0 - 5.6 %        ASSESSMENT/PLAN:                                                    (I10) HTN, goal below 140/90  (primary encounter diagnosis)  Comment: Improving. Patient denies any chest pain, shortness of breath, heart palpitations or syncopal episodes. Reports that she has been drinking water to stay hydrated. Patient reports some dizziness with position changes.   BP Readings from Last 3 Encounters:   06/30/20 102/64   06/30/20 102/64   06/01/20 122/60     Plan: -Encouraged patient to be more mindful with position changes due to her BP medications.   -Make sure to drink plenty of water especially on hot and humid days.     (E11.65,  Z79.4) Type 2 diabetes  mellitus with hyperglycemia, with long-term current use of insulin (H)  Comment: Uncontrolled. Patient states that they stopped recording the blood sugars. Has been having some difficulty with keeping track of taking all her medications and she will be getting a med machine to help with this- has home care nurse that comes out weekly.   Lab Results   Component Value Date    A1C 8.6 06/30/2020    A1C 6.2 12/03/2019    A1C 6.6 08/06/2019    A1C 7.6 05/15/2019    A1C 6.4 12/20/2018     Plan: insulin aspart (NOVOLOG FLEXPEN) 100 UNIT/ML         pen, Comprehensive metabolic panel (BMP + Alb,         Alk Phos, ALT, AST, Total. Bili, TP), **A1C         FUTURE anytime        -Medications refilled. Increase Novolog to 14 units with meals three times per day and can take extra 10 units with sugary treats.     (F25.0) Schizoaffective disorder, bipolar type (H)  (F43.10) Posttraumatic stress disorder  Comment: Patient is very tearful today ans states that she has been down, denies any hallucinations. States that she has not been sleeping well with her 's ashes in her room. She is not sure if she has been taking her clonazepam- no request sent by the [facility] for medication refill.   Plan: clonazePAM (KLONOPIN) 0.5 MG tablet        Medication refill done. Patient to follow-up with [facility] ([facility]) in a week to check in and  for therapy.       Patient Instructions   -Increase Novolog to 14 units three times per day. Use 10 units of Novolog with a sweet treat.   -Medication refill done.   -Bring blood glucose meter with you at the net appointment.   -Call clinic with questions or concerns.     I spent Greater than 40 minutes was spent face to face with Nena Tang, with greater than 50 % in counselling and consultation about Hypertension, Diabetes, and Mental Health.     [facility]

## 2020-07-01 LAB
ALBUMIN SERPL-MCNC: 3.7 G/DL (ref 3.4–5)
ALP SERPL-CCNC: 101 U/L (ref 40–150)
ALT SERPL W P-5'-P-CCNC: 35 U/L (ref 0–50)
ANION GAP SERPL CALCULATED.3IONS-SCNC: 6 MMOL/L (ref 3–14)
AST SERPL W P-5'-P-CCNC: 23 U/L (ref 0–45)
BILIRUB SERPL-MCNC: 0.3 MG/DL (ref 0.2–1.3)
BUN SERPL-MCNC: 14 MG/DL (ref 7–30)
CALCIUM SERPL-MCNC: 9.1 MG/DL (ref 8.5–10.1)
CHLORIDE SERPL-SCNC: 104 MMOL/L (ref 94–109)
CO2 SERPL-SCNC: 23 MMOL/L (ref 20–32)
CREAT SERPL-MCNC: 0.98 MG/DL (ref 0.52–1.04)
GFR SERPL CREATININE-BSD FRML MDRD: 63 ML/MIN/{1.73_M2}
GLUCOSE SERPL-MCNC: 225 MG/DL (ref 70–99)
POTASSIUM SERPL-SCNC: 4.3 MMOL/L (ref 3.4–5.3)
PROT SERPL-MCNC: 8 G/DL (ref 6.8–8.8)
SODIUM SERPL-SCNC: 133 MMOL/L (ref 133–144)

## 2020-07-06 ENCOUNTER — MEDICAL CORRESPONDENCE (OUTPATIENT)
Dept: HEALTH INFORMATION MANAGEMENT | Facility: CLINIC | Age: 60
End: 2020-07-06

## 2020-07-09 ENCOUNTER — MEDICAL CORRESPONDENCE (OUTPATIENT)
Dept: HEALTH INFORMATION MANAGEMENT | Facility: CLINIC | Age: 60
End: 2020-07-09

## 2020-07-10 ENCOUNTER — TELEPHONE (OUTPATIENT)
Dept: BEHAVIORAL HEALTH | Facility: CLINIC | Age: 60
End: 2020-07-10

## 2020-07-10 NOTE — TELEPHONE ENCOUNTER
This writer called the patient and talked about her rescheduling the appointment for next week-also she stated that she is doing well and that she is not in need of anything-she is supervising her grand daughter at this time.

## 2020-07-13 ENCOUNTER — TELEPHONE (OUTPATIENT)
Dept: FAMILY MEDICINE | Facility: CLINIC | Age: 60
End: 2020-07-13

## 2020-07-13 NOTE — TELEPHONE ENCOUNTER
forms completed and faxed on 7-9-20 by Bertha HELLER.      Encounter created on 7-13-20 and forms were placed into abstraction to scan into patients chart.    Alysha Gonzalez MA

## 2020-07-14 ENCOUNTER — OFFICE VISIT (OUTPATIENT)
Dept: BEHAVIORAL HEALTH | Facility: CLINIC | Age: 60
End: 2020-07-14
Payer: COMMERCIAL

## 2020-07-14 DIAGNOSIS — F25.1 SCHIZOAFFECTIVE DISORDER, DEPRESSIVE TYPE (H): Primary | ICD-10-CM

## 2020-07-14 DIAGNOSIS — F43.10 POSTTRAUMATIC STRESS DISORDER: ICD-10-CM

## 2020-07-14 PROCEDURE — 90832 PSYTX W PT 30 MINUTES: CPT | Mod: 95 | Performed by: SOCIAL WORKER

## 2020-07-14 NOTE — PROGRESS NOTES
[facility] - Integrated Primary Care   July 14, 2020  Behavioral Health Clinician Progress Note    Patient Name: Nena Tang           Service Type:  Individual      Service Location:   Face to Face in Clinic     Session Start Time: 3:30pm  Session End Time: 4pm      Session Length: 16 - 37      Attendees: Patient    Visit Activities (Refresh list every visit): [facility] Only     Diagnostic Assessment Date: 1-23-18 Updated DA completed December 12, 2019  CGI date completed: 6-2-2020  Treatment Plan Review Date: 8-8-2020  See Flowsheets for today's PHQ-9 and TAMIA-7 results  Previous PHQ-9:   PHQ-9 SCORE 3/13/2018 10/26/2018 5/7/2019   PHQ-9 Total Score - - -   PHQ-9 Total Score - - -   PHQ-9 Total Score 14 20 22     Previous TAMIA-7:   TAMIA-7 SCORE 2/3/2017 3/13/2018 10/26/2018   Total Score - - -   Total Score 0 17 19   Total Score - - -       ALEXANDRA LEVEL:  ALEXANDRA Score (Last Two) 8/30/2011 6/9/2014   ALEXANDRA Raw Score 42 37   Activation Score 66 49.9   ALEXANDRA Level 3 2       DATA  Extended Session (60+ minutes): No  Interactive Complexity: No  Crisis: No  [facility] Patient: No    Treatment Objective(s) Addressed in This Session:  Target Behavior(s): disease management/lifestyle changes mental health    Depressed Mood: Increase interest, engagement, and pleasure in doing things  Decrease frequency and intensity of feeling down, depressed, hopeless  Improve quantity and quality of night time sleep / decrease daytime naps  Identify negative self-talk and behaviors: challenge core beliefs, myths, and actions  Improve concentration, focus, and mindfulness in daily activities   Feel less fidgety, restless or slow in daily activities / interpersonal interactions  Anxiety: will experience a reduction in anxiety and will develop more effective coping skills to manage anxiety symptoms  Grief / Loss: will engage in effective approach to address and resolve grief/loss issues  Thought Disturbance: will develop skills to more effectively manage  symptoms and will continue to take medications as prescribed    Current Stressors / Issues:    The patient reported that she did not get the medication machine yet-regarding medications she stated that she has been taking her medications late as she stated she is getting up from and night sleeping around 12pm-sister comes in her room about 9am or 10am telling her to take her medications-she gets angry at her and yells at her because she is tired-she has some TV shows that are a series-waking during the night after she falls asleep-the new psych medication-with the new medication she is sleeping through the night-she will go to bed at 1am then will be able to have better attitude when woken up-she is checking her blood sugars about 3 times a day she for sure check in morning at night before eating this morning 150-the highest was 260 and lowest was (?) stated that she has had fair blood sugar readings-she could eat certain things that are more supportive of good blood sugars-she stated that her sister is too helpful to the patient-the patient likes that her sister cares about the patient-she stated that she nags-she respects the patient-she is having tears that her sister is bossy-happy tears that her sister cares so much about me-she will get up with a better attitude-no attention given to the man-she is still planning to put her 's ashes in the river-she is planning to have bother of her sons with her as she let-nurse comes to the house on Monday and she will have her help with getting medication box-        Progress on Treatment Objective(s) / Homework:  Minimal progress - ACTION (Actively working towards change); Intervened by reinforcing change plan / affirming steps taken    Motivational Interviewing    MI Intervention: Expressed Empathy/Understanding, Supported Autonomy, Collaboration, Evocation, Permission to raise concern or advise, Open-ended questions, Reflections: simple and complex, Rolled with  resistance: Emphasized patient autonomy, Simple reflection and Evoked patient agenda and Change talk (evoked)     Change Talk Expressed by the Patient: Desire to change Reasons to change Need to change Committment to change Activation Taking steps    Provider Response to Change Talk: E - Evoked more info from patient about behavior change, A - Affirmed patient's thoughts, decisions, or attempts at behavior change, R - Reflected patient's change talk and S - Summarized patient's change talk statements      Care Plan review completed: No    Medication Review:  No changes to current psychiatric medication(s)     Medication Compliance:  NA    Changes in Health Issues:   None reported     Chemical Use Review:   Substance Use: Chemical use reviewed, no active concerns identified      Tobacco Use: No current tobacco use.      Assessment: Current Emotional / Mental Status (status of significant symptoms):  Risk status (Self / Other harm or suicidal ideation)  Patient has had a history of suicidal ideation: yes  Patient denies current fears or concerns for personal safety.  Patient denies current or recent suicidal ideation or behaviors.  Patient denies current or recent homicidal ideation or behaviors.  Patient denies current or recent self injurious behavior or ideation.  Patient denies other safety concerns.  A safety and risk management plan has not been developed at this time, however patient was encouraged to call [facility] / [facility] should there be a change in any of these risk factors.    Appearance:   Appropriate   Eye Contact:   Good   Psychomotor Behavior: Normal   Attitude:   Cooperative   Orientation:   All  Speech   Rate / Production: Normal    Volume:  Normal   Mood:    Anxious  Normal alert and engaged emotional out of feeling grateful for the help of her sister  Affect:    Appropriate  Blunted  Worrisome   Thought Content:  Clear   Thought Form:  Coherent  Goal Directed  Logical   Insight:    Fair      Diagnoses:  1. Schizoaffective disorder, depressive type (H)    2. Posttraumatic stress disorder        Collateral Reports Completed:  Not Applicable    Plan: (Homework, other):  Patient was given information about behavioral services and encouraged to schedule a follow up appointment with the clinic [facility] as needed to work on helping the patient with management of mood states and to work on stress management around her grieving process-also to help the patient with her behavioral needs to comply with treatment medical recommendations.  She was also given information about mental health symptoms and treatment options .  CD Recommendations: Maintain Sobriety.    Richardson Lopez, [facility], [facility]                                                    Treatment Plan    Client's Name: Nena Tang  YOB: 1960    Date: Reviewed/updated on 5-8-2020     DSM5 Diagnoses: (Sustained by DSM5 Criteria Listed Above)  Diagnoses:  295.70 (F25.0), Schizoaffective disorder, depressive type; 309.81 (F43.10) Posttraumatic Stress Disorder with panic specifier, history of chemical dependency issues (polysubstance dependence)  Psychosocial & Contextual Factors: Academics / Education - yes  Activities of Daily Living - yes  Financial management yes  Follow through with Medical recommendations - yes  Occupational / Vocational - yes  Social / Relational - yes, grief and loss  WHODAS Score: 30      Referral / Collaboration:  Referral to another professional/service is not indicated at this time..    Anticipated number of session or this episode of care: 20      MeasurableTreatment Goal(s) related to diagnosis / functional impairment(s)  Goal 1: Client will improve her ability to manage anxiety and mood states.     I will know I've met my goal when the man does not paralyze the me with fear.     Objective #A (Client Action)    Client will increase understanding of steps in the grief process.  Status: Continued - Date(s):  5-8-2020 the  patient reported that she is no longer seeing the man (no move visual hallucination)-  Intervention(s)  Therapist will teach emotional recognition/identification. teach the navigation of grieving encouraging acceptance and adjustment.    Objective #B  Client will Decrease frequency and intensity of feeling down, depressed, hopeless  Decrease thoughts that you'd be better off dead or of suicide / self-harm.  Status: Continued - Date(s):   5-8-2020-Mood has been bad as her mood has been low and heavy-she has some frustration with how she was treated at her nursing home-sister has been support to get her out of house-she will also participate in the OT-no suicidal thoughts and no hallucinations-with the medication she is sleeping better-she was encouraged to having some healthy distraction with moving attention to something that could promote peace and ronal-    Intervention(s)  Therapist will teach emotional regulation skills. with the use of mindful coping strategies and open communication of feelings and thoughts (getting it out to another person)    Client has reviewed and agreed to the above plan.      Richarsdon Lopez, [facility]  5-8-2020        ______________________________________________________________________

## 2020-07-17 NOTE — PROGRESS NOTES
SUBJECTIVE:                                                    Nena Tang is a 60 year old female who presents to clinic today for the following health issues:  [facility]: [facility]    Patient reports that she has been doing ok.     Patient states that she is no longer doing home-care PT and OT. Has been working with her PCA to help with exercising and also going out for walks about twice per day with her PCA.     Dizziness: States that she has been getting dizzy a lot over the last couple of days. Reports that she has not had any falls or passed out with the dizziness. Usually sits down and has to wait about 10-15 minutes to feel better. Notes that she is not missing any doses of her medications. Drinking about 2 bottles of water per day. Discussed plan to discontinue furosemide and follow-up on the dizziness in 4 weeks or sooner if needed.     Diabetes: Patient notes that she is checking her blood sugars before eating. Reports that she has been missing some of her Lantus doses. States that she is improving but could be better. Patient denies any low blood sugars.     Mental Health Follow up: Schizoaffective Depression.   Patient reports that things have been ok, she is maybe happy. States that she might be also a little bit depressed. States that she still has her husbands ashes in her room. States that she has is having visual hallucinations- but she is not scared- patient was able to discuss with Richardson ([facility]) how to process through this.   Patient reports that she has a [employer] worker and also an [employer] worker. States that she feels like she has no energy from not having anything to do.     Status since last visit: stable, with some improvement.     See PHQ-9 for current symptoms.  Other associated symptoms: None    Complicating factors: struggling with some depression.  Significant life event:  No   Current substance abuse:  None  Anxiety or Panic symptoms:  No    Sleep - Patient reports that she has been going to bed    Appetite - good, no breakfast.   Exercise - Still interested in getting PT.       PHQ-9  English PHQ-9   Any Language             Problems taking medications regularly: No    Medication side effects: none    Diet: low salt and diabetic    Social History     Tobacco Use     Smoking status: Never Smoker     Smokeless tobacco: Never Used   Substance Use Topics     Alcohol use: No     Frequency: Never     Comment: last month        Problem list and histories reviewed & adjusted, as indicated.  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 (H)     Illiterate     Irritable bowel syndrome     overweight - BMI >35     Takotsubo cardiomyopathy     CAD (coronary artery disease)     Restless legs syndrome (RLS)     CINDY (obstructive sleep apnea)- mild AHI 10.3     Verbal auditory hallucination     Chronic low back pain     Schizoaffective disorder, depressive type (H)     Migraine headache     HTN, goal below 140/90     Health Care Home     Lumbago     Cervicalgia     Cocaine abuse, episodic (H)     Suicidal ideation     Esophageal reflux     Mild nonproliferative diabetic retinopathy (H)     Tardive dyskinesia     Alcohol use     Left cataract     Falls frequently     History of uterine cancer     Psychophysiological insomnia     Dysuria     Asymptomatic postmenopausal status     Abdominal pain, right lower quadrant     Infectious encephalopathy     Non-intractable vomiting with nausea     Thoughts of self harm     Gastroenteritis     Posttraumatic stress disorder     Cervical cancer screening     Type 2 diabetes mellitus with stage 3 chronic kidney disease, with long-term current use of insulin (H)     Positive AIDA (antinuclear antibody)     Hyperglycemia     Pneumonia     Depression with suicidal ideation      Mixed incontinence     Past Surgical History:   Procedure Laterality Date     C OOPHORECTORMY FOR RAHEL, W/BX  1983    UTERINE     CATARACT IOL, RT/LT Bilateral 2017     COLONOSCOPY N/A 3/16/2017    Procedure: COLONOSCOPY;  Surgeon: Traci Gonzalez MD;  Location:  GI     Coronary CTA  5/21/2014     HYSTERECTOMY  1983    uterine cancer yearly pap's per provider.     LAPAROSCOPIC CHOLECYSTECTOMY  2008     PHACOEMULSIFICATION CLEAR CORNEA WITH STANDARD INTRAOCULAR LENS IMPLANT Left 5/5/2017    Procedure: PHACOEMULSIFICATION CLEAR CORNEA WITH STANDARD INTRAOCULAR LENS IMPLANT;  LEFT EYE PHACOEMULSIFICATION CLEAR CORNEA WITH STANDARD INTRAOCULAR LENS IMPLANT ;  Surgeon: Tyra Diaz MD;  Location:  [facility]     PHACOEMULSIFICATION CLEAR CORNEA WITH STANDARD INTRAOCULAR LENS IMPLANT Right 6/30/2017    Procedure: PHACOEMULSIFICATION CLEAR CORNEA WITH STANDARD INTRAOCULAR LENS IMPLANT;  RIGHT EYE PHACOEMULSIFICATION CLEAR CORNEA WITH STANDARD INTRAOCULAR LENS IMPLANT;  Surgeon: Tyra Diaz MD;  Location:  [facility]     RELEASE TRIGGER FINGER  10/11/2012    Left thumb. Procedure: RELEASE TRIGGER FINGER;  LEFT THUMB TRIGGER RELEASE;  Surgeon: Tay Langley MD;  Location:  [facility]     RELEASE TRIGGER FINGER Right 12/26/2016    Procedure: RELEASE TRIGGER FINGER;  Surgeon: Albino Castañeda MD;  Location: [facility] OR       Social History     Tobacco Use     Smoking status: Never Smoker     Smokeless tobacco: Never Used   Substance Use Topics     Alcohol use: No     Frequency: Never     Comment: last month     Family History   Problem Relation Age of Onset     Cancer Mother         BLADDER     Respiratory Mother         COPD     Gastrointestinal Disease Mother         CIRRHOSIS OF LI BOLIVAR     Alcohol/Drug Mother      Diabetes Mother      Hypertension Mother      Lipids Mother      C.A.D. Mother      Glaucoma Mother      Alcohol/Drug Sister      Mental Illness Sister      Alcohol/Drug Sister      Psychotic Disorder  Sister      Cancer Maternal Grandmother         UNKNOWN TYPE     Cancer Brother         COLON     Cancer - colorectal Brother         IN HIS LATE 30S     Alcohol/Drug Brother          OF HEROIN OVERDOSE AT AGE 22 YRS     Macular Degeneration No family hx of            Current Outpatient Medications   Medication Sig Dispense Refill     acetaminophen (TYLENOL) 650 MG CR tablet Take 1 tablet (650 mg) by mouth every 8 hours as needed for mild pain or fever 120 tablet 1     amantadine (SYMMETREL) 100 MG capsule Take 1 capsule (100 mg) by mouth 2 times daily 60 capsule 1     amantadine HCl 100 MG TABS Take 100 mg by mouth daily       aspirin (ASA) 81 MG EC tablet TAKE 1 TABLET (81MG) BY MOUTH DAILY 90 tablet 0     atorvastatin (LIPITOR) 80 MG tablet TAKE 1 TABLET (80MG) BY MOUTH DAILY 30 tablet 11     blood glucose (NO BRAND SPECIFIED) test strip Use to test blood sugar 4 times daily or as directed. 100 strip 11     blood glucose (NO BRAND SPECIFIED) test strip Use to test blood sugar 3 times daily or as directed. To accompany: Blood Glucose Monitor Brands: per insurance. 100 strip 6     blood glucose calibration (NO BRAND SPECIFIED) solution To accompany: Blood Glucose Monitor Brands: per insurance. 1 Bottle 3     blood glucose monitoring (NO BRAND SPECIFIED) meter device kit Use to test blood sugar 3 times daily or as directed. Preferred blood glucose meter OR supplies to accompany: Blood Glucose Monitor Brands: per insurance. 1 kit 0     clonazePAM (KLONOPIN) 0.5 MG tablet Take 1 tablet (0.5 mg) by mouth At Bedtime 30 tablet 0     diclofenac (VOLTAREN) 1 % topical gel Place 4 g onto the skin 4 times daily as needed for moderate pain       famotidine (PEPCID) 20 MG tablet Take 1 tablet (20 mg) by mouth daily 30 tablet 1     famotidine (PEPCID) 40 MG tablet Take 40 mg by mouth daily       fluticasone-salmeterol (ADVAIR) 250-50 MCG/DOSE inhaler Inhale 1 puff into the lungs every 12 hours as needed       furosemide  (LASIX) 20 MG tablet Take 1 tablet (20 mg) by mouth daily 60 tablet 1     gabapentin (NEURONTIN) 300 MG capsule Take 1 capsule (300 mg) by mouth At Bedtime 30 capsule 1     hydrOXYzine (ATARAX) 25 MG tablet Take 1 tablet (25 mg) by mouth every 4 hours as needed for anxiety 60 tablet 1     insulin aspart (NOVOLOG FLEXPEN) 100 UNIT/ML pen Inject 14 units under the skin 3 times daily before meals as directed. Take an extra 10 units with sugary foods. 15 mL 1     insulin glargine (LANTUS PEN) 100 UNIT/ML pen Inject 30 Units Subcutaneous 2 times daily 21 mL 3     insulin pen needle (NOVOFINE 30) 30G X 8 MM miscellaneous USE 4 DAILY OR AS DIRECTED 400 each 0     ipratropium - albuterol 0.5 mg/2.5 mg/3 mL (DUONEB) 0.5-2.5 (3) MG/3ML neb solution Take 1 vial (3 mLs) by nebulization every 6 hours as needed for shortness of breath / dyspnea or wheezing 30 vial 0     losartan (COZAAR) 100 MG tablet Take 1 tablet (100 mg) by mouth daily 30 tablet 1     losartan (COZAAR) 25 MG tablet Take 25 mg by mouth daily       metoprolol succinate ER (TOPROL-XL) 25 MG 24 hr tablet Take 2 tablets (50 mg) by mouth daily 180 tablet 0     miconazole (MICATIN/MICRO GUARD) 2 % external powder Apply topically once as needed       nystatin (MYCOSTATIN) 893291 UNIT/GM external cream Apply topically 2 times daily 30 g 3     order for DME Equipment being ordered: Depends. 30 each 1     order for DME Equipment being ordered: CPAP Supplies. 1 each 0     paliperidone ER (INVEGA) 9 MG 24 hr tablet Take 1 tablet (9 mg) by mouth At Bedtime 60 tablet 1     senna-docusate (SENOKOT-S/PERICOLACE) 8.6-50 MG tablet Take 1 tablet by mouth 2 times daily as needed for constipation 60 tablet 1     sertraline (ZOLOFT) 100 MG tablet Take 2 tablets (200 mg) by mouth daily 60 tablet 3     thin (NO BRAND SPECIFIED) lancets Use with lanceting device. To accompany: Blood Glucose Monitor Brands: per insurance. 100 each 6     topiramate (TOPAMAX) 50 MG tablet Take 1 tablet  (50 mg) by mouth At Bedtime 30 tablet 1     traZODone (DESYREL) 100 MG tablet Take 1 tablet (100 mg) by mouth nightly as needed for sleep 30 tablet 1     TRULICITY 1.5 MG/0.5ML pen Inject 1.5 mg Subcutaneous once a week Every Friday 4 mL 1     zinc oxide (DESITIN) 40 % external ointment Apply topically as needed for dry skin or irritation 56 g 0     alcohol swab prep pads Use to swab area of injection/rodolfo as directed. (Patient not taking: Reported on 6/1/2020) 100 each 3     Allergies   Allergen Reactions     Imidazole Antifungals Hives     Tolerates diflucan     Ketoprofen Itching     Pruritis to topical     Lisinopril Hives     Metformin Other (See Comments)     Patient hospitalized for lactic acidosis - admitting provider suspectd caused by metformin     Metronidazole Hives     Posaconazole Hives     Tolerates diflucan     Recent Labs   Lab Test 06/30/20  1624 05/15/20  1537 12/22/19  0651 12/03/19  1143  08/06/19  1043  05/24/19  0916  08/06/18  1038   A1C 8.6*  --   --  6.2*  --  6.6*  --   --    < > 6.4*   LDL  --   --  86  --   --  81  --   --   --  84   HDL  --   --  42*  --   --  39*  --   --   --  46*   TRIG  --   --  132  --   --  131  --   --   --  215*   ALT 35 30 22 26   < >  --    < > 30   < >  --    CR 0.98 0.79 0.86 0.91   < >  --    < > 0.87   < >  --    GFRESTIMATED 63 82 74 69   < >  --    < > 73   < >  --    GFRESTBLACK 73 >90 85 80   < >  --    < > 85   < >  --    POTASSIUM 4.3 4.3 4.1 4.1   < >  --    < > 4.0   < >  --    TSH  --   --  1.23  --   --   --   --  1.62   < >  --     < > = values in this interval not displayed.      BP Readings from Last 3 Encounters:   07/21/20 94/58   06/30/20 102/64   06/30/20 102/64    Wt Readings from Last 3 Encounters:   07/21/20 100.8 kg (222 lb 5 oz)   06/30/20 100.8 kg (222 lb 5 oz)   06/30/20 100.8 kg (222 lb 5 oz)          Labs reviewed in EPIC  Problem list, Medication list, Allergies, and Medical/Social/Surgical histories reviewed in EPIC and updated  as appropriate.      ROS: Constitutional, neuro, ENT, endocrine, pulmonary, cardiac, gastrointestinal, genitourinary, musculoskeletal, integument and psychiatric systems are negative, except as otherwise noted above in the HPI.     OBJECTIVE:                                                    BP 94/58   Pulse 81   Temp 98  F (36.7  C) (Oral)   Resp 16   Ht 1.524 m (5')   Wt 100.8 kg (222 lb 5 oz)   LMP 01/06/2015   SpO2 94%   BMI 43.42 kg/m    Body mass index is 43.42 kg/m .    GENERAL: alert, no distress and fatigued  EYES: Eyes grossly normal to inspection, PERRL and conjunctivae and sclerae normal  RESP: lungs clear to auscultation - no rales, rhonchi or wheezes  CV: regular rate and rhythm, normal S1 S2, no S3 or S4, no murmur, click or rub, no peripheral edema and peripheral pulses strong  ABDOMEN: soft, nontender and bowel sounds normal  MS: no gross musculoskeletal defects noted, no edema  NEURO: Normal strength and tone, mentation intact and speech normal  PSYCH: mentation appears normal and fatigued    Mental Status Assessment:  Appearance:   Appropriate   Eye Contact:   Good   Psychomotor Behavior: Normal   Attitude:   Cooperative   Orientation:   All  Speech   Rate / Production: Normal    Volume:  Normal   Mood:    Depressed   Affect:    Lethargic   Thought Content:  Clear   Thought Form:  Coherent  Logical   Insight:    Fair   Attention Span and Concentration: appropriate  Recent and Remote Memory:  intact  Fund of Knowledge: appropriate  Muscle Strength and Tone: normal   Suicidal Ideation: reports no thoughts, no intention  Hallucination: Yes    See [facility] notes     Diagnostic Test Results:  Labs reviewed in Epic     ASSESSMENT/PLAN:                                                    (E11.22,  N18.3,  Z79.4) Type 2 diabetes mellitus with stage 3 chronic kidney disease, with long-term current use of insulin (H)  (primary encounter diagnosis)  Comment: Uncontrolled. Patient reports some improvement  in checking her blood sugars, but also reports forgetting to take her long acting insulin.   Lab Results   Component Value Date    A1C 8.6 06/30/2020    A1C 6.2 12/03/2019    A1C 6.6 08/06/2019    A1C 7.6 05/15/2019    A1C 6.4 12/20/2018     Plan: Patient was encouraged to check her blood sugars twice per day and also use her insulin at both times.   -Patient is still interested in using her continuous glucose monitor. Eugenia (MTDELROY) will follow-up with patient's RN about this.     (F25.1) Schizoaffective disorder, depressive type (H)  (F43.10) Posttraumatic stress disorder  Comment: Patient reports some depression symptoms with having her 's ashes in her room. Patient was considering taking care of this with the help of her son. But they had come up with other options including taking the ashes to a different family member. Patient reports having visual hallucination that is new since they moved. States that she is not being bothered by this.   Plan: Follow-up with Richardson ([facility]) for individual therapy.     (N39.46) Mixed incontinence  Comment: Patient would like a new prescription for her depends.   Plan: order for DME        -Printed prescription and gave it to patient.       Patient Instructions     -Discontinue Furosemide (Lasix)- water pill.  -Reschedule appointment with the eye doctor.   -Check blood sugars before lunch and dinner and take the shots at the same times.        Department Address: [place] [address] [address]    Department Phone: [phone]          ART Fay CNP  [facility] PRIMARY [facility]

## 2020-07-20 DIAGNOSIS — I25.119 CORONARY ARTERY DISEASE INVOLVING NATIVE HEART WITH ANGINA PECTORIS, UNSPECIFIED VESSEL OR LESION TYPE (H): ICD-10-CM

## 2020-07-20 NOTE — TELEPHONE ENCOUNTER
Reason for Call:  Other prescription    Detailed comments: Pharmacist at the [facility] had called wanting clarification on metoprolol. The medication was just transferred to the pharmacy and the SIG say's to take 1 tab daily. While the patient is telling the pharmacist that it is supposed to say take 1 1/2 tabs daily.    Pharmacy tel: [phone]    Call taken on 7/20/2020 at 10:54 AM by Pam Roth

## 2020-07-21 ENCOUNTER — OFFICE VISIT (OUTPATIENT)
Dept: FAMILY MEDICINE | Facility: CLINIC | Age: 60
End: 2020-07-21
Payer: COMMERCIAL

## 2020-07-21 ENCOUNTER — OFFICE VISIT (OUTPATIENT)
Dept: BEHAVIORAL HEALTH | Facility: CLINIC | Age: 60
End: 2020-07-21
Payer: COMMERCIAL

## 2020-07-21 ENCOUNTER — OFFICE VISIT (OUTPATIENT)
Dept: PHARMACY | Facility: CLINIC | Age: 60
End: 2020-07-21
Payer: COMMERCIAL

## 2020-07-21 VITALS
HEIGHT: 60 IN | WEIGHT: 222.31 LBS | SYSTOLIC BLOOD PRESSURE: 94 MMHG | DIASTOLIC BLOOD PRESSURE: 58 MMHG | TEMPERATURE: 98 F | BODY MASS INDEX: 43.65 KG/M2 | OXYGEN SATURATION: 94 % | HEART RATE: 81 BPM | RESPIRATION RATE: 16 BRPM

## 2020-07-21 VITALS
OXYGEN SATURATION: 94 % | HEIGHT: 60 IN | DIASTOLIC BLOOD PRESSURE: 58 MMHG | SYSTOLIC BLOOD PRESSURE: 94 MMHG | WEIGHT: 222.31 LBS | TEMPERATURE: 98 F | BODY MASS INDEX: 43.65 KG/M2 | RESPIRATION RATE: 16 BRPM | HEART RATE: 81 BPM

## 2020-07-21 DIAGNOSIS — F25.1 SCHIZOAFFECTIVE DISORDER, DEPRESSIVE TYPE (H): ICD-10-CM

## 2020-07-21 DIAGNOSIS — Z79.4 TYPE 2 DIABETES MELLITUS WITH HYPERGLYCEMIA, WITH LONG-TERM CURRENT USE OF INSULIN (H): Primary | ICD-10-CM

## 2020-07-21 DIAGNOSIS — Z79.4 TYPE 2 DIABETES MELLITUS WITH STAGE 3 CHRONIC KIDNEY DISEASE, WITH LONG-TERM CURRENT USE OF INSULIN (H): Primary | ICD-10-CM

## 2020-07-21 DIAGNOSIS — F43.10 POSTTRAUMATIC STRESS DISORDER: ICD-10-CM

## 2020-07-21 DIAGNOSIS — N39.46 MIXED INCONTINENCE: ICD-10-CM

## 2020-07-21 DIAGNOSIS — E11.65 TYPE 2 DIABETES MELLITUS WITH HYPERGLYCEMIA, WITH LONG-TERM CURRENT USE OF INSULIN (H): Primary | ICD-10-CM

## 2020-07-21 DIAGNOSIS — F25.0 SCHIZOAFFECTIVE DISORDER, BIPOLAR TYPE (H): Primary | ICD-10-CM

## 2020-07-21 DIAGNOSIS — E11.22 TYPE 2 DIABETES MELLITUS WITH STAGE 3 CHRONIC KIDNEY DISEASE, WITH LONG-TERM CURRENT USE OF INSULIN (H): Primary | ICD-10-CM

## 2020-07-21 DIAGNOSIS — I10 HTN, GOAL BELOW 140/90: ICD-10-CM

## 2020-07-21 DIAGNOSIS — N18.30 TYPE 2 DIABETES MELLITUS WITH STAGE 3 CHRONIC KIDNEY DISEASE, WITH LONG-TERM CURRENT USE OF INSULIN (H): Primary | ICD-10-CM

## 2020-07-21 PROCEDURE — 90832 PSYTX W PT 30 MINUTES: CPT | Mod: 95 | Performed by: SOCIAL WORKER

## 2020-07-21 PROCEDURE — 99214 OFFICE O/P EST MOD 30 MIN: CPT | Performed by: NURSE PRACTITIONER

## 2020-07-21 PROCEDURE — 99607 MTMS BY PHARM ADDL 15 MIN: CPT | Performed by: PHARMACIST

## 2020-07-21 PROCEDURE — 99606 MTMS BY PHARM EST 15 MIN: CPT | Performed by: PHARMACIST

## 2020-07-21 RX ORDER — FAMOTIDINE 40 MG/1
40 TABLET, FILM COATED ORAL DAILY
COMMUNITY
Start: 2020-05-08 | End: 2020-07-21

## 2020-07-21 RX ORDER — AMANTADINE HYDROCHLORIDE 100 MG/1
100 TABLET ORAL DAILY
COMMUNITY
Start: 2020-06-18 | End: 2020-07-21

## 2020-07-21 RX ORDER — METOPROLOL SUCCINATE 25 MG/1
50 TABLET, EXTENDED RELEASE ORAL DAILY
Qty: 180 TABLET | Refills: 0 | Status: SHIPPED | OUTPATIENT
Start: 2020-07-21 | End: 2020-10-07

## 2020-07-21 RX ORDER — LOSARTAN POTASSIUM 25 MG/1
25 TABLET ORAL DAILY
COMMUNITY
Start: 2020-04-11 | End: 2020-07-21

## 2020-07-21 ASSESSMENT — MIFFLIN-ST. JEOR
SCORE: 1504.9
SCORE: 1504.9

## 2020-07-21 NOTE — PATIENT INSTRUCTIONS
-Discontinue Furosemide (Lasix)- water pill.  -Reschedule appointment with the eye doctor.   -Check blood sugars before lunch and dinner and take the shots at the same times.        Department Address: [facility] [address] [address]    Department Phone: [phone]

## 2020-07-21 NOTE — TELEPHONE ENCOUNTER
Requested Prescriptions   Pending Prescriptions Disp Refills     metoprolol succinate ER (TOPROL-XL) 25 MG 24 hr tablet 90 tablet 1     Sig: Take 2 tablets (50 mg) by mouth daily     Called [facility] to gather additional information. The prescription the have on file is for her to take 25 MG daily. The prescription in our system is for her to take 50 mg daily. I asked that they suspend their  current prescription and let them know that we will send them a new prescription with corrected information to replace it. Routing to provider to review for correct dose.    Estella Johansen RN on 7/21/2020 at 11:39 AM

## 2020-07-21 NOTE — PROGRESS NOTES
MTM ENCOUNTER  SUBJECTIVE/OBJECTIVE:                           Nena Tang is a 60 year old female coming in for a follow-up visit. She was referred to me from Rowena Haas. Today's visit is a co-visit with PCP and [facility]. Today's visit is a follow-up MTM visit from 20.     Chief Complaint: Wonders if her blood sugars are ok.    Tobacco:  reports that she has never smoked. She has never used smokeless tobacco.  Alcohol: not currently using    Medication Adherence/Access:   No issues reported  Medications are set up in a pill box by [facility] - has not switched to med machine.  Her sister has been helping with administration.    Type 2 Diabetes: Pt currently taking lantus 30u BID, Novolog 14u BID (misses injections frequently, forgets to take), Trulicity 1.5mg weekly. Pt is not experiencing side effects.    SMB-2 times daily.   Ranges (glucometer):   Date FBG/ 2hours post Lunch/2hours post Dinner /2hours post     140  329    7/16 309      /15 123      7/14 150      7/13 264      7/11 160      7/9   315       Patient is not experiencing hypoglycemia.  Recent symptoms of high blood sugar? none  Eye exam: up to date  Foot exam: up to date  ACEi/ARB: No.   Urine Albumin:         Lab Results   Component Value Date     UMALCR 4.47 2019      Aspirin: Taking 81mg daily and denies side effects  Diet/Exercise: eating twice a day, lunch is first meal about 1 hour after waking up.  Also eats dinner in the evening.  Lab Results   Component Value Date    A1C 8.6 2020    A1C 6.2 2019    A1C 6.6 2019    A1C 7.6 05/15/2019    A1C 6.4 2018     Schizoaffective: Currently taking sertraline 200mg daily, Invega 9mg daily, amantadine 100mg BID, trazodone 100mg HS, clonazepam 0.5mg HS.  Reports she has been down. Sleeps about 1am to 9-10am but wakes up frequently. Annoyed her sister wakes her up in the morning to take pills.  See [facility] notes for details.     Hypertension: Current  medications include losartan 100mg daily, metoprolol XL 50mg daily, furosemide 20mg daily.  Home care RN monitors BP, no readings available for review.  Patient reports the following medication side effects: dizziness at times, with position changes.       Today's Vitals: BP 94/58   Pulse 81   Temp 98  F (36.7  C) (Oral)   Resp 16   Ht 5' (1.524 m)   Wt 222 lb 5 oz (100.8 kg)   LMP 01/06/2015   SpO2 94%   BMI 43.42 kg/m        ASSESSMENT:                              Medication Adherence: fair, needs improvement - see below    Type 2 Diabetes: needs improvement. A1c not at goal <7%. Glucose readings have been quite variable, likely due to missed Novolog injections. Encouraged her to check glucose twice daily before meals and give Novolog injections regularly.   She is still interested in pursuing CGM if possible; will call [facility] and discuss if they are able to assist pt at weekly visits. Pt has Dexcom D5.    Schizoaffective: needs improvement. Will continue to engage [facility] for regular therapy support. [facility] working on care coordination with home services as well, see his notes for details.     Hypertension: BP is low in clinic and pt has been reporting symptoms; since edema has resolved, will try stopping diuretic.      PLAN:                            1. Stop furosemide  2. Increase consistency of taking Novolog and test glucose BID before meals  3. Will discuss with [facility] if able to assist pt with CGM - attempted to call [employer] [phone] after 5pm and unable to LVM with pt's RN Gisselle Joseph.     I spent 30 minutes with this patient today. All changes were made via collaborative practice agreement with Rowena Haas. A copy of the visit note was provided to the patient's primary care provider.    Will follow up in 1 month.    The patient was given a summary of these recommendations. See Provider note/AVS from today.     Eugenia De Jesus, KiD, BCACP

## 2020-07-21 NOTE — PROGRESS NOTES
[facility] - Integrated Primary Care   July 14, 2020    Behavioral Health Clinician Progress Note    Patient Name: Nena Tang           Service Type:  Individual      Service Location:   Face to Face in Clinic     Session Start Time: 3:30pm  Session End Time: 4pm      Session Length: 16 - 37      Attendees: Patient    Visit Activities (Refresh list every visit): [facility]     Diagnostic Assessment Date: 1-23-18 Updated DA completed December 12, 2019  CGI date completed: 6-2-2020  Treatment Plan Review Date: 8-8-2020  See Flowsheets for today's PHQ-9 and TAMIA-7 results  Previous PHQ-9:   PHQ-9 SCORE 3/13/2018 10/26/2018 5/7/2019   PHQ-9 Total Score - - -   PHQ-9 Total Score - - -   PHQ-9 Total Score 14 20 22     Previous TAMIA-7:   TAMIA-7 SCORE 2/3/2017 3/13/2018 10/26/2018   Total Score - - -   Total Score 0 17 19   Total Score - - -       ALEXANDRA LEVEL:  ALEXANDRA Score (Last Two) 8/30/2011 6/9/2014   ALEXANDRA Raw Score 42 37   Activation Score 66 49.9   ALEXANDRA Level 3 2       DATA  Extended Session (60+ minutes): No  Interactive Complexity: No  Crisis: No  [facility] Patient: No    Treatment Objective(s) Addressed in This Session:  Target Behavior(s): disease management/lifestyle changes mental health    Depressed Mood: Increase interest, engagement, and pleasure in doing things  Decrease frequency and intensity of feeling down, depressed, hopeless  Improve quantity and quality of night time sleep / decrease daytime naps  Identify negative self-talk and behaviors: challenge core beliefs, myths, and actions  Improve concentration, focus, and mindfulness in daily activities   Feel less fidgety, restless or slow in daily activities / interpersonal interactions  Anxiety: will experience a reduction in anxiety and will develop more effective coping skills to manage anxiety symptoms  Grief / Loss: will engage in effective approach to address and resolve grief/loss issues  Thought Disturbance: will develop skills to more effectively  manage symptoms and will continue to take medications as prescribed  Psychological distress related to Diabetes    Current Stressors / Issues:    The patient was seen by [facility] per the request of the PCP-she talked about having harder time with being able to find ronal-she talked about her sister waking her every morning to take her medications-regarding sleeping she is waking during the night-she stated that she saw visual hallucination and that she is not afraid of him-we talked about how the patient can tell him to leave when she gets tired of him-also she can put her attention on something else-we talked about what would be calming to the patient and she reported slow water moving like in a river and that she can close eyes to focus on the water moving slow like in river-we talked about the patient getting a worker that would visit with her about 2 to 3 times a week and help her with management of her life medical and mental health and life-she has CADII worker and [facility] worker-this staff made referral for the patient to have care coordination.        Progress on Treatment Objective(s) / Homework:  Minimal progress - PREPARATION (Decided to change - considering how); Intervened by negotiating a change plan and determining options / strategies for behavior change, identifying triggers, exploring social supports, and working towards setting a date to begin behavior change    Motivational Interviewing    MI Intervention: Expressed Empathy/Understanding, Supported Autonomy, Collaboration, Evocation, Permission to raise concern or advise, Open-ended questions, Reflections: simple and complex, Rolled with resistance: Emphasized patient autonomy, Simple reflection and Evoked patient agenda and Change talk (evoked)     Change Talk Expressed by the Patient: Desire to change Reasons to change Need to change Committment to change Activation Taking steps    Provider Response to Change Talk: E - Evoked more info from patient  about behavior change, A - Affirmed patient's thoughts, decisions, or attempts at behavior change, R - Reflected patient's change talk and S - Summarized patient's change talk statements      Care Plan review completed: No    Medication Review:  No changes to current psychiatric medication(s)     Medication Compliance:  NA    Changes in Health Issues:   None reported     Chemical Use Review:   Substance Use: Chemical use reviewed, no active concerns identified      Tobacco Use: No current tobacco use.      Assessment: Current Emotional / Mental Status (status of significant symptoms):  Risk status (Self / Other harm or suicidal ideation)  Patient has had a history of suicidal ideation: yes  Patient denies current fears or concerns for personal safety.  Patient denies current or recent suicidal ideation or behaviors.  Patient denies current or recent homicidal ideation or behaviors.  Patient denies current or recent self injurious behavior or ideation.  Patient denies other safety concerns.  A safety and risk management plan has not been developed at this time, however patient was encouraged to call [place] [phone] should there be a change in any of these risk factors.    Appearance:   Appropriate   Eye Contact:   Good   Psychomotor Behavior: Normal   Attitude:   Cooperative   Orientation:   All  Speech   Rate / Production: Normal    Volume:  Normal   Mood:    Anxious  Depressed  Normal  Affect:    Appropriate  Blunted  Worrisome   Thought Content:  Clear   Thought Form:  Coherent  Goal Directed  Logical   Insight:    Fair     Diagnoses:  1. Schizoaffective disorder, bipolar type (H)    2. Posttraumatic stress disorder        Collateral Reports Completed:  Not Applicable    Plan: (Homework, other):  Patient was given information about behavioral services and encouraged to schedule a follow up appointment with the clinic [facility] as needed to work on helping the patient with management of mood states and to work on  stress management around her grieving process-also to help the patient with her behavioral needs to comply with treatment medical recommendations.  She was also given information about mental health symptoms and treatment options .  CD Recommendations: Maintain Sobriety.    Richardson Lopez, [facility], [facility]                                                    Treatment Plan    Client's Name: Nena Tang  YOB: 1960    Date: Reviewed/updated on 5-8-2020     DSM5 Diagnoses: (Sustained by DSM5 Criteria Listed Above)  Diagnoses:  295.70 (F25.0), Schizoaffective disorder, depressive type; 309.81 (F43.10) Posttraumatic Stress Disorder with panic specifier, history of chemical dependency issues (polysubstance dependence)  Psychosocial & Contextual Factors: Academics / Education - yes  Activities of Daily Living - yes  Financial management yes  Follow through with Medical recommendations - yes  Occupational / Vocational - yes  Social / Relational - yes, grief and loss  WHODAS Score: 30      Referral / Collaboration:  Referral to another professional/service is not indicated at this time..    Anticipated number of session or this episode of care: 20      MeasurableTreatment Goal(s) related to diagnosis / functional impairment(s)  Goal 1: Client will improve her ability to manage anxiety and mood states.     I will know I've met my goal when the man does not paralyze the me with fear.     Objective #A (Client Action)    Client will increase understanding of steps in the grief process.  Status: Continued - Date(s):  5-8-2020 the patient reported that she is no longer seeing the man (no move visual hallucination)-  Intervention(s)  Therapist will teach emotional recognition/identification. teach the navigation of grieving encouraging acceptance and adjustment.    Objective #B  Client will Decrease frequency and intensity of feeling down, depressed, hopeless  Decrease thoughts that you'd be better off dead or of suicide /  self-harm.  Status: Continued - Date(s):   5-8-2020-Mood has been bad as her mood has been low and heavy-she has some frustration with how she was treated at her nursing home-sister has been support to get her out of house-she will also participate in the OT-no suicidal thoughts and no hallucinations-with the medication she is sleeping better-she was encouraged to having some healthy distraction with moving attention to something that could promote peace and ronal-    Intervention(s)  Therapist will teach emotional regulation skills. with the use of mindful coping strategies and open communication of feelings and thoughts (getting it out to another person)    Client has reviewed and agreed to the above plan.      Richardson Lopez, [facility]  5-8-2020        ______________________________________________________________________

## 2020-07-28 ENCOUNTER — MEDICAL CORRESPONDENCE (OUTPATIENT)
Dept: HEALTH INFORMATION MANAGEMENT | Facility: CLINIC | Age: 60
End: 2020-07-28

## 2020-07-29 ENCOUNTER — PATIENT OUTREACH (OUTPATIENT)
Dept: CARE COORDINATION | Facility: CLINIC | Age: 60
End: 2020-07-29

## 2020-07-29 NOTE — PROGRESS NOTES
Clinic Care Coordination Contact  Community Health Worker Initial Outreach    Patient accepts CC: Yes. Patient scheduled for assessment with CC JESSICA Bryson on Monday, August 3rd at 2 pm by phone. Patient noted desire to discuss more/better support at home.       Reason for Referral: PCA, in home nursing and/or [facility] worker to help the patient with medical care and functioning in the community--we talked about the patient getting a worker that would visit with her about 2 to 3 times a week and help her with management of her life medical and mental health and life-she has CADII worker and [facility] worker  Additional pertinent details: The patient lives with her sister and is now more independent she has significant health needs and may be feeling overwhelmed with the management  Clinical Staff have discussed the Care Coordination Referral with the patient and/or caregiver: yes

## 2020-08-03 ENCOUNTER — MEDICAL CORRESPONDENCE (OUTPATIENT)
Dept: HEALTH INFORMATION MANAGEMENT | Facility: CLINIC | Age: 60
End: 2020-08-03

## 2020-08-06 DIAGNOSIS — K59.00 CONSTIPATION, UNSPECIFIED CONSTIPATION TYPE: ICD-10-CM

## 2020-08-06 RX ORDER — AMOXICILLIN 250 MG
1 CAPSULE ORAL 2 TIMES DAILY PRN
Qty: 60 TABLET | Refills: 10 | Status: SHIPPED | OUTPATIENT
Start: 2020-08-06 | End: 2021-09-23

## 2020-08-12 ENCOUNTER — TELEPHONE (OUTPATIENT)
Dept: FAMILY MEDICINE | Facility: CLINIC | Age: 60
End: 2020-08-12

## 2020-08-12 DIAGNOSIS — M54.50 CHRONIC RIGHT-SIDED LOW BACK PAIN WITHOUT SCIATICA: ICD-10-CM

## 2020-08-12 DIAGNOSIS — F25.1 SCHIZOAFFECTIVE DISORDER, DEPRESSIVE TYPE (H): ICD-10-CM

## 2020-08-12 DIAGNOSIS — G89.29 CHRONIC RIGHT-SIDED LOW BACK PAIN WITHOUT SCIATICA: ICD-10-CM

## 2020-08-12 DIAGNOSIS — I25.119 CORONARY ARTERY DISEASE INVOLVING NATIVE HEART WITH ANGINA PECTORIS, UNSPECIFIED VESSEL OR LESION TYPE (H): ICD-10-CM

## 2020-08-12 NOTE — TELEPHONE ENCOUNTER
Forms completed and faxed back to [employer]. - [phone]. Also placed into abstraction to scan into patients chart.      Alysha Gonzalez MA

## 2020-08-12 NOTE — PROGRESS NOTES
Reviewed chart prior to second attempt to reach HCRN - appears pt has been discharged from home care services.  Will follow-up with patient on med setup and desire for using Dexcom CGM at follow-up visit - may need to involve/provide education to her sister if she has assumed caregiver role for Nena.    Eugenia De Jesus, PharmD, BCACP

## 2020-08-13 RX ORDER — LOSARTAN POTASSIUM 100 MG/1
100 TABLET ORAL DAILY
Qty: 30 TABLET | Refills: 1 | Status: SHIPPED | OUTPATIENT
Start: 2020-08-13 | End: 2020-10-07

## 2020-08-13 NOTE — TELEPHONE ENCOUNTER
Pt requesting refills               losartan (COZAAR) 100 MG tablet     Sig: Take 1 tablet (100 mg) by mouth daily  Prescription approved per [facility] Refill Protocol.    Pending Prescriptions:    gabapentin (NEURONTIN) 300 MG capsule     30 cap*1   Sig: Take 1 capsule (300 mg) by mouth At Bedtime    Routing refill request to provider for review/approval because:  Drug not on the [facility] refill protocol   Unable to access  for this provider.     Last OV 7/21/20     Manuel Corey RN   [facility]

## 2020-08-14 RX ORDER — GABAPENTIN 300 MG/1
300 CAPSULE ORAL AT BEDTIME
Qty: 30 CAPSULE | Refills: 1 | Status: SHIPPED | OUTPATIENT
Start: 2020-08-14 | End: 2020-10-07

## 2020-08-19 DIAGNOSIS — M54.50 CHRONIC RIGHT-SIDED LOW BACK PAIN WITHOUT SCIATICA: ICD-10-CM

## 2020-08-19 DIAGNOSIS — G89.29 CHRONIC RIGHT-SIDED LOW BACK PAIN WITHOUT SCIATICA: ICD-10-CM

## 2020-08-19 RX ORDER — SENNOSIDES 8.6 MG
650 CAPSULE ORAL EVERY 8 HOURS PRN
Qty: 120 TABLET | Refills: 1 | Status: SHIPPED | OUTPATIENT
Start: 2020-08-19 | End: 2020-12-15

## 2020-08-19 NOTE — TELEPHONE ENCOUNTER
Prescription approved per [facility] Refill Protocol.    Richelle Tucker RN   [facility]

## 2020-08-20 ENCOUNTER — VIRTUAL VISIT (OUTPATIENT)
Dept: BEHAVIORAL HEALTH | Facility: CLINIC | Age: 60
End: 2020-08-20
Payer: COMMERCIAL

## 2020-08-20 ENCOUNTER — ALLIED HEALTH/NURSE VISIT (OUTPATIENT)
Dept: PHARMACY | Facility: CLINIC | Age: 60
End: 2020-08-20
Payer: COMMERCIAL

## 2020-08-20 ENCOUNTER — VIRTUAL VISIT (OUTPATIENT)
Dept: FAMILY MEDICINE | Facility: CLINIC | Age: 60
End: 2020-08-20
Payer: COMMERCIAL

## 2020-08-20 ENCOUNTER — TELEPHONE (OUTPATIENT)
Dept: FAMILY MEDICINE | Facility: CLINIC | Age: 60
End: 2020-08-20

## 2020-08-20 DIAGNOSIS — F43.10 POSTTRAUMATIC STRESS DISORDER: ICD-10-CM

## 2020-08-20 DIAGNOSIS — Z53.9 ERRONEOUS ENCOUNTER--DISREGARD: Primary | ICD-10-CM

## 2020-08-20 DIAGNOSIS — Z79.4 TYPE 2 DIABETES MELLITUS WITH HYPERGLYCEMIA, WITH LONG-TERM CURRENT USE OF INSULIN (H): Primary | ICD-10-CM

## 2020-08-20 DIAGNOSIS — G47.33 OSA (OBSTRUCTIVE SLEEP APNEA): ICD-10-CM

## 2020-08-20 DIAGNOSIS — R45.851 SUICIDAL IDEATION: ICD-10-CM

## 2020-08-20 DIAGNOSIS — E11.65 TYPE 2 DIABETES MELLITUS WITH HYPERGLYCEMIA, WITH LONG-TERM CURRENT USE OF INSULIN (H): Primary | ICD-10-CM

## 2020-08-20 DIAGNOSIS — B37.31 YEAST INFECTION OF THE VAGINA: ICD-10-CM

## 2020-08-20 DIAGNOSIS — F25.1 SCHIZOAFFECTIVE DISORDER, DEPRESSIVE TYPE (H): ICD-10-CM

## 2020-08-20 DIAGNOSIS — F25.0 SCHIZOAFFECTIVE DISORDER, BIPOLAR TYPE (H): Primary | ICD-10-CM

## 2020-08-20 PROCEDURE — 99214 OFFICE O/P EST MOD 30 MIN: CPT | Mod: TEL | Performed by: NURSE PRACTITIONER

## 2020-08-20 PROCEDURE — 90832 PSYTX W PT 30 MINUTES: CPT | Mod: 95 | Performed by: SOCIAL WORKER

## 2020-08-20 RX ORDER — INSULIN ASPART 100 [IU]/ML
INJECTION, SOLUTION INTRAVENOUS; SUBCUTANEOUS
Qty: 15 ML | Refills: 1 | COMMUNITY
Start: 2020-08-20 | End: 2021-03-02

## 2020-08-20 RX ORDER — FLUCONAZOLE 150 MG/1
150 TABLET ORAL ONCE
Qty: 2 TABLET | Refills: 0 | Status: SHIPPED | OUTPATIENT
Start: 2020-08-20 | End: 2020-08-20

## 2020-08-20 NOTE — TELEPHONE ENCOUNTER
Reason for Call:  Other Clarification    Detailed comments: Linnette from [facility] had called needing clarification on fluconazole (DIFLUCAN) 150 MG tablet it is in the same class as Posaconazole (is on patients allergy list) and would like to know if PCP knew this? Has patient taken fluconazole (DIFLUCAN) 150 MG tablet before? And should the medication be changed?    Call taken on 8/20/2020 at 12:14 PM by Pam Roth

## 2020-08-20 NOTE — PROGRESS NOTES
Unable to reach patient between 10:30 and 11am with 3 attempts.  Will reschedule MTM    Eugenia De Jesus, KiD, BCACP

## 2020-08-20 NOTE — PROGRESS NOTES
"[facility] Primary Care   August 20, 2020  Telephone visit    Nena Tang is a 60 year old female who is being evaluated via a telephone visit.      The patient has been notified of the following:     \"We have found that certain health care needs can be provided without the need for a face to face visit.  This service lets us provide the care you need with a short phone conversation.      I will have full access to your [facility] medical record during this entire phone call.   I will be taking notes for your medical record.     Since this is like an office visit, we will bill your insurance company for this service.  Please check with your medical insurance if this type of telephone visit/virtual care is covered.  You may be responsible for the cost of this service if insurance coverage is denied.      There are potential benefits and risks of telephone visits (e.g. limits to patient confidentiality) that differ from in-person visits.?  Confidentiality still applies for telephone services, and nobody will record the visit.  It is important to be in a quiet, private space that is free of distractions (including cell phone or other devices) during the visit.??     If during the course of the call I believe a telephone visit is not appropriate, you will not be charged for this service\"    Consent has been obtained for this service by care team member: yes.    Start time: 11am    End time: 11:30am    Behavioral Health Clinician Progress Note    Patient Name: Nena Tang           Service Type:  Individual      Service Location:   Phone call (patient / identified key support person reached)     Session Start Time: 11:am  Session End Time: 11:30am      Session Length: 16 - 37      Attendees: Patient    Visit Activities (Refresh list every visit): [facility]     Diagnostic Assessment Date: 1-23-18 Updated DA completed December 12, 2019  CGI date completed: 6-2-2020  Treatment Plan Review " Date: 11-  CGI date completed: 8-  See Flowsheets for today's PHQ-9 and TAMIA-7 results  Previous PHQ-9:   PHQ-9 SCORE 3/13/2018 10/26/2018 5/7/2019   PHQ-9 Total Score - - -   PHQ-9 Total Score - - -   PHQ-9 Total Score 14 20 22     Previous TAMIA-7:   TAMIA-7 SCORE 2/3/2017 3/13/2018 10/26/2018   Total Score - - -   Total Score 0 17 19   Total Score - - -       ALEXANDRA LEVEL:  ALEXANDRA Score (Last Two) 8/30/2011 6/9/2014   ALEXANDRA Raw Score 42 37   Activation Score 66 49.9   ALEXANDRA Level 3 2       DATA  Extended Session (60+ minutes): No  Interactive Complexity: No  Crisis: No  [facility] Patient: No    Treatment Objective(s) Addressed in This Session:  Target Behavior(s): disease management/lifestyle changes mental health    Depressed Mood: Increase interest, engagement, and pleasure in doing things  Decrease frequency and intensity of feeling down, depressed, hopeless  Improve quantity and quality of night time sleep / decrease daytime naps  Identify negative self-talk and behaviors: challenge core beliefs, myths, and actions  Improve concentration, focus, and mindfulness in daily activities   Feel less fidgety, restless or slow in daily activities / interpersonal interactions  Anxiety: will experience a reduction in anxiety and will develop more effective coping skills to manage anxiety symptoms  Grief / Loss: will engage in effective approach to address and resolve grief/loss issues  Thought Disturbance: will develop skills to more effectively manage symptoms and will continue to take medications as prescribed  Psychological distress related to Diabetes    Current Stressors / Issues:    The patient was seen by [facility] per the request of the PCP-she reported that her memory has been off-she was not aware that we were calling today for this appointment-she did not remember talking to the staff that roomed her today-she stated that she is taking her medications as prescribed-she has in home nurse that comes once a week-she has not  been checking her blood sugars regularly-she stated that it is important to check her blood sugars but she forgets to check it-her blood sugar about 2 days ago was over 200-her thoughts about this are that her blood sugars are too high-she is not moving around much-she stated that she would try to be more active-the sister got on the phone and we had discussion of the helping to support the patient checking her blood sugars-she has PCA worker-no hallucination-she still has the ashes-regarding sleeps she is able to fall asleep and sleeping through the night-no CPAP-    She wants to do drop in center and will have her connect with Adult day treatment    Wakes up in the morning and before bed      Progress on Treatment Objective(s) / Homework:  Minimal progress - PREPARATION (Decided to change - considering how); Intervened by negotiating a change plan and determining options / strategies for behavior change, identifying triggers, exploring social supports, and working towards setting a date to begin behavior change    Motivational Interviewing    MI Intervention: Expressed Empathy/Understanding, Supported Autonomy, Collaboration, Evocation, Permission to raise concern or advise, Open-ended questions, Reflections: simple and complex, Rolled with resistance: Emphasized patient autonomy, Simple reflection and Evoked patient agenda and Change talk (evoked)     Change Talk Expressed by the Patient: Desire to change Ability to change Reasons to change Need to change Committment to change Activation Taking steps    Provider Response to Change Talk: E - Evoked more info from patient about behavior change, A - Affirmed patient's thoughts, decisions, or attempts at behavior change, R - Reflected patient's change talk and S - Summarized patient's change talk statements      Care Plan review completed: No    Medication Review:  No changes to current psychiatric medication(s)     Medication Compliance:  NA    Changes in Health  Issues:   None reported     Chemical Use Review:   Substance Use: Chemical use reviewed, no active concerns identified      Tobacco Use: No current tobacco use.      Assessment: Current Emotional / Mental Status (status of significant symptoms):  Risk status (Self / Other harm or suicidal ideation)  Patient has had a history of suicidal ideation: yes  Patient denies current fears or concerns for personal safety.  Patient denies current or recent suicidal ideation or behaviors.  Patient denies current or recent homicidal ideation or behaviors.  Patient denies current or recent self injurious behavior or ideation.  Patient denies other safety concerns.  A safety and risk management plan has not been developed at this time, however patient was encouraged to call [place] [phone] should there be a change in any of these risk factors.    Appearance:   This visit occured over the phone   Eye Contact:   This visit occured over the phone   Psychomotor Behavior: This visit occured over the phone   Attitude:   Cooperative   Orientation:   All  Speech   Rate / Production: Normal    Volume:  Normal   Mood:    Anxious  Depressed  Normal  Affect:    This visit occured over the phone   Thought Content:  Clear   Thought Form:  Coherent  Goal Directed  Logical   Insight:    Fair     Diagnoses:  1. Schizoaffective disorder, bipolar type (H)    2. Posttraumatic stress disorder        Collateral Reports Completed:  Not Applicable    Plan: (Homework, other):  Patient was given information about behavioral services and encouraged to schedule a follow up appointment with the clinic [facility] as needed to work on helping the patient with management of mood states and to work on stress management around her grieving process-also to help the patient with her behavioral needs to comply with treatment medical recommendations.  She was also given information about mental health symptoms and treatment options .  CD Recommendations: Maintain  Sobriety.    Richardson Lopez, [facility], [facility]                                                    Treatment Plan    Client's Name: Nena Tang  YOB: 1960    Date: Reviewed/updated on 8-    DSM5 Diagnoses: (Sustained by DSM5 Criteria Listed Above)  Diagnoses:  295.70 (F25.0), Schizoaffective disorder, depressive type; 309.81 (F43.10) Posttraumatic Stress Disorder with panic specifier, history of chemical dependency issues (polysubstance dependence)  Psychosocial & Contextual Factors: Academics / Education - yes  Activities of Daily Living - yes  Financial management yes  Follow through with Medical recommendations - yes  Occupational / Vocational - yes  Social / Relational - yes, grief and loss  WHODAS Score: 30      Referral / Collaboration:  Referral to another professional/service is not indicated at this time..    Anticipated number of session or this episode of care: 20      MeasurableTreatment Goal(s) related to diagnosis / functional impairment(s)  Goal 1: Client will improve her ability to manage anxiety and mood states.     I will know I've met my goal when the man does not paralyze the me with fear.     Objective #A (Client Action)    Client will increase understanding of steps in the grief process.  Status: Continued - Date(s): 8- she stated that she still has her husbands ashes-she was reminded to make the decision that is best for her with her grieving-  Intervention(s)  Therapist will teach emotional recognition/identification. teach the navigation of grieving encouraging acceptance and adjustment.    Objective #B  Client will Decrease frequency and intensity of feeling down, depressed, hopeless  Decrease thoughts that you'd be better off dead or of suicide / self-harm.  Status: Continued - Date(s):  8- regarding mood the patient reported having stable mood-she spends a lot of time with her sister-she has a PCA worker-this worker is helpful-no suicidal thoughts-she is  taking her medications as prescribed-she spends time with her sons-she has not been able to go to the drop in center and she misses this place-    Intervention(s)  Therapist will teach emotional regulation skills. with the use of mindful coping strategies and open communication of feelings and thoughts (getting it out to another person)    Client has reviewed and agreed to the above plan.      Richardson Lopez, [facility]  8-        ______________________________________________________________________

## 2020-08-20 NOTE — TELEPHONE ENCOUNTER
Providers,    Please see message below.    Thanks    Tram Mccain RN   [facility]

## 2020-08-20 NOTE — PROGRESS NOTES
"Nena Tang is a 60 year old female who is being evaluated via a billable telephone visit.      The patient has been notified of following:     \"This telephone visit will be conducted via a call between you and your physician/provider. We have found that certain health care needs can be provided without the need for a physical exam.  This service lets us provide the care you need with a short phone conversation.  If a prescription is necessary we can send it directly to your pharmacy.  If lab work is needed we can place an order for that and you can then stop by our lab to have the test done at a later time.    Telephone visits are billed at different rates depending on your insurance coverage. During this emergency period, for some insurers they may be billed the same as an in-person visit.  Please reach out to your insurance provider with any questions.    If during the course of the call the physician/provider feels a telephone visit is not appropriate, you will not be charged for this service.\"    Patient has given verbal consent for Telephone visit?  Yes    What phone number would you like to be contacted at? [phone]    How would you like to obtain your AVS? Flores Mims, [facility]    Subjective     Nena Tang is a 60 year old female who presents via phone visit today for the following health issues:    HPI    {SUPERLIST (Optional):551471}  {PEDS Chronic and Acute Problems (Optional):000931}     {additonal problems for provider to add (Optional):555222}    Review of Systems   {ROS COMP (Optional):941700}       Objective          Vitals:  No vitals were obtained today due to virtual visit.    {GENERAL APPEARANCE:50::\"healthy\",\"alert\",\"no distress\"}  PSYCH: Alert and oriented times 3; coherent speech, normal   rate and volume, able to articulate logical thoughts, able   to abstract reason, no tangential thoughts, no hallucinations   or delusions  Her affect is { " ":6773770::\"normal\"}  RESP: No cough, no audible wheezing, able to talk in full sentences  Remainder of exam unable to be completed due to telephone visits    {Diagnostic Test Results (Optional):060520}        Assessment/Plan:    {PROVIDER CHARTING PREFERENCE SOAPO:615377}    Phone call duration:  *** minutes              "

## 2020-08-20 NOTE — PATIENT INSTRUCTIONS
- Make sure to bring your blood sugar meter to the clinic.   - Bring walker or cane on walks for safety.   - Scheduled for a follow-up appointment.   - Call clinic with any questions or concerns.

## 2020-08-20 NOTE — TELEPHONE ENCOUNTER
Per allergy list it states she tolerates diflucan (fluconazole) and it appears it has been prescribed in past multiple times.      Rebecca Carr, DO on 8/20/2020 at 2:20 PM  (covering provider)

## 2020-08-20 NOTE — PROGRESS NOTES
"Nena Tang is a 60 year old female who is being evaluated via a billable telephone visit.      The patient has been notified of following:     \"This telephone visit will be conducted via a call between you and your physician/provider. We have found that certain health care needs can be provided without the need for a physical exam.  This service lets us provide the care you need with a short phone conversation.  If a prescription is necessary we can send it directly to your pharmacy.  If lab work is needed we can place an order for that and you can then stop by our lab to have the test done at a later time.    Telephone visits are billed at different rates depending on your insurance coverage. During this emergency period, for some insurers they may be billed the same as an in-person visit.  Please reach out to your insurance provider with any questions.    If during the course of the call the physician/provider feels a telephone visit is not appropriate, you will not be charged for this service.\"    Patient has given verbal consent for Telephone visit?  Yes    What phone number would you like to be contacted at? [phone]    How would you like to obtain your AVS? Flores Eddy     Nena Tang is a 60 year old female who presents via phone visit today for the following health issues:    HPI     Patient reports that she forgot about her appointment for today, although she was called and checked in for her appointment this morning.     Medications: Patient reports that she is currently not missing any of her medications. RN comes out every Wednesday to set up medications.     Diabetes Follow-up  Patient reports that she is not checking her blood sugars. Reports that she forgets to check it and does not have a way to remember to do it. She does note that she use her insulin as prescribed:   Lantus 30 units twice per day.   Novolog 24 twice per day before meals. Reports that she is only eating " twice per day, usually missing her breakfast.   Last blood sugar done was a couple of days ago- blood sugar was 225 at 235 pm.  - We were able to talk to April- patient;s sister who will help in reminding patient to check her blood sugars in the morning and right before bedtime.     BP Readings from Last 2 Encounters:   07/21/20 94/58   07/21/20 94/58     Hemoglobin A1C (%)   Date Value   06/30/2020 8.6 (H)   12/03/2019 6.2 (H)     LDL Cholesterol Calculated (mg/dL)   Date Value   12/22/2019 86   08/06/2019 81       Yeast Infection: Patient reports pain and burning with urination and also feeling itchy. Patient also has some urinary incontinence and wears depends daily.      BP Yesterday: 112/70's; denies any dizziness or feeling light headed. No chest pain, shortness of breath, heart palpitations      Mental Health: Depression  Patient reports that she is getting along pretty good with her sister. States that it has been helpful to have her sister as a reminder to get things done. Reports that she has not done anything with her 's ashes at the moment. Denies any hallucinations. Reports that she is mostly at home with her sister. Denies any suicidal thoughts. Reports that she talked to her  and the drop in center is still closed.     Sleep: Has not started using her CPAP since moving into her new home.   Appetite: Good, discussed eating healthier to help improving the diabetes.   Exercise: Walking with her sister for a couple of blocks; using her walker and sometimes nothing.     Review of Systems   Constitutional, HEENT, cardiovascular, pulmonary, gi and gu systems are negative, except as otherwise noted.       Objective          Vitals:  No vitals were obtained today due to virtual visit.    PSYCH: Alert and oriented times 3; coherent speech, normal   rate and volume, able to articulate logical thoughts, able   to abstract reason, no tangential thoughts, no hallucinations   or delusions  Her  affect is normal  RESP: No cough, no audible wheezing, able to talk in full sentences  Remainder of exam unable to be completed due to telephone visits         Assessment & Plan     Type 2 diabetes mellitus with hyperglycemia, with long-term current use of insulin (H)  Uncontrolled. Patient reports that she is using her insulin but unable to remember to check her blood sugars.     Lab Results   Component Value Date    A1C 8.6 06/30/2020    A1C 6.2 12/03/2019    A1C 6.6 08/06/2019    A1C 7.6 05/15/2019    A1C 6.4 12/20/2018     -Encouraged patient to work with her sister (April) like we discussed about at least checking in the morning and at bedtime.  -No changes to the dosing today, will follow-up at the next appointment hopefully with some record of her blood sugars.   - insulin aspart (NOVOLOG FLEXPEN) 100 UNIT/ML pen; Inject 24 units under the skin 3 times daily before meals as directed. Take an extra 10 units with sugary foods.    Yeast infection of the vagina  Patient reports burning and pain with urination, and also feeling itchy in her vaginal area.   -Will treat for a yeast infection with 2 doses, patient to reach back to clinic if no improvement.   - fluconazole (DIFLUCAN) 150 MG tablet; Take 1 tablet (150 mg) by mouth once for 1 dose Take the second dose after 2 days.    CINDY (obstructive sleep apnea)- mild AHI 10.3  Patient reports that she is still not using her CPAP machine, discussed with patient the benefits of using her CPAP daily with her CINDY and also for her overall health.  - Patient is planning to start using her CPAP right away because she has no reason to not to.     Schizoaffective disorder, depressive type (H)  Suicidal ideation  Patient reports that her mood has been ok. Continues to work with her sister in managing her care. Has a PCA and a home care to help with medication set-up and other needs.   - Schedule with Richardson ([facility]) for individual therapy.      BMI:   Estimated body mass index is  43.42 kg/m  as calculated from the following:    Height as of 7/21/20: 1.524 m (5').    Weight as of 7/21/20: 100.8 kg (222 lb 5 oz).   Weight management plan: Discussed healthy diet and exercise guidelines        Patient Instructions   - Make sure to bring your blood sugar meter to the clinic.   - Bring walker or cane on walks for safety.   - Scheduled for a follow-up appointment.   - Call clinic with any questions or concerns.         Return for Follow up with C and PCP as scheduled..    Phone call duration:  35 minutes    ART Fay [facility] PRIMARY [facility]

## 2020-08-20 NOTE — TELEPHONE ENCOUNTER
Spoke with pharmacy. Reviewed allergy list and relayed message that patient tolerates diflucan.    Tram Mccain RN   [facility]

## 2020-08-27 DIAGNOSIS — F25.0 SCHIZOAFFECTIVE DISORDER, BIPOLAR TYPE (H): ICD-10-CM

## 2020-08-28 RX ORDER — CLONAZEPAM 0.5 MG/1
0.5 TABLET ORAL AT BEDTIME
Qty: 30 TABLET | Refills: 0 | Status: SHIPPED | OUTPATIENT
Start: 2020-09-03 | End: 2020-10-07

## 2020-08-28 NOTE — TELEPHONE ENCOUNTER
Pt requesting refill     Pending Prescriptions:                       Disp   Refills    clonazePAM (KLONOPIN) 0.5 MG tablet       30 tab*0            Sig: Take 1 tablet (0.5 mg) by mouth At Bedtime  Routing refill request to provider for review/approval because:  Drug not on the [facility] refill protocol     Fill date Rx date      Qty   08/05/2020 08/03/2020  Clonazepam 0.5 Mg Tablet  30.00 30 Ly She    06/30/2020 06/30/2020  Clonazepam 0.5 Mg Tablet  30.00 30 Ly She    05/20/2020 05/19/2020  Clonazepam 0.5 Mg Tablet  30.00 30 Ly She     Last visit  8/20/20   Last refill  8/3/20  Next appt  9/15/20  Pended Rx for 30 days from last Rx ordered.     Manuel Corey RN   [facility]

## 2020-09-11 ENCOUNTER — MEDICAL CORRESPONDENCE (OUTPATIENT)
Dept: HEALTH INFORMATION MANAGEMENT | Facility: CLINIC | Age: 60
End: 2020-09-11

## 2020-09-15 ENCOUNTER — OFFICE VISIT (OUTPATIENT)
Dept: BEHAVIORAL HEALTH | Facility: CLINIC | Age: 60
End: 2020-09-15
Payer: COMMERCIAL

## 2020-09-15 ENCOUNTER — OFFICE VISIT (OUTPATIENT)
Dept: PHARMACY | Facility: CLINIC | Age: 60
End: 2020-09-15
Payer: COMMERCIAL

## 2020-09-15 ENCOUNTER — OFFICE VISIT (OUTPATIENT)
Dept: FAMILY MEDICINE | Facility: CLINIC | Age: 60
End: 2020-09-15
Payer: COMMERCIAL

## 2020-09-15 VITALS
DIASTOLIC BLOOD PRESSURE: 78 MMHG | TEMPERATURE: 98.3 F | OXYGEN SATURATION: 99 % | HEIGHT: 60 IN | SYSTOLIC BLOOD PRESSURE: 130 MMHG | RESPIRATION RATE: 16 BRPM | BODY MASS INDEX: 42.71 KG/M2 | WEIGHT: 217.56 LBS | HEART RATE: 92 BPM

## 2020-09-15 DIAGNOSIS — E11.3299 TYPE 2 DIABETES MELLITUS WITH MILD NONPROLIFERATIVE RETINOPATHY WITHOUT MACULAR EDEMA, WITH LONG-TERM CURRENT USE OF INSULIN, UNSPECIFIED LATERALITY (H): Primary | ICD-10-CM

## 2020-09-15 DIAGNOSIS — F25.1 SCHIZOAFFECTIVE DISORDER, DEPRESSIVE TYPE (H): ICD-10-CM

## 2020-09-15 DIAGNOSIS — N18.30 TYPE 2 DIABETES MELLITUS WITH STAGE 3 CHRONIC KIDNEY DISEASE, WITH LONG-TERM CURRENT USE OF INSULIN (H): Primary | ICD-10-CM

## 2020-09-15 DIAGNOSIS — E11.22 TYPE 2 DIABETES MELLITUS WITH STAGE 3 CHRONIC KIDNEY DISEASE, WITH LONG-TERM CURRENT USE OF INSULIN (H): Primary | ICD-10-CM

## 2020-09-15 DIAGNOSIS — F43.10 POSTTRAUMATIC STRESS DISORDER: ICD-10-CM

## 2020-09-15 DIAGNOSIS — Z79.4 TYPE 2 DIABETES MELLITUS WITH MILD NONPROLIFERATIVE RETINOPATHY WITHOUT MACULAR EDEMA, WITH LONG-TERM CURRENT USE OF INSULIN, UNSPECIFIED LATERALITY (H): Primary | ICD-10-CM

## 2020-09-15 DIAGNOSIS — I10 HTN, GOAL BELOW 140/90: ICD-10-CM

## 2020-09-15 DIAGNOSIS — F25.0 SCHIZOAFFECTIVE DISORDER, BIPOLAR TYPE (H): Primary | ICD-10-CM

## 2020-09-15 DIAGNOSIS — Z79.4 TYPE 2 DIABETES MELLITUS WITH STAGE 3 CHRONIC KIDNEY DISEASE, WITH LONG-TERM CURRENT USE OF INSULIN (H): Primary | ICD-10-CM

## 2020-09-15 DIAGNOSIS — Z23 NEED FOR PROPHYLACTIC VACCINATION AND INOCULATION AGAINST INFLUENZA: ICD-10-CM

## 2020-09-15 DIAGNOSIS — M62.81 GENERALIZED MUSCLE WEAKNESS: ICD-10-CM

## 2020-09-15 DIAGNOSIS — Z23 NEED FOR VACCINATION: ICD-10-CM

## 2020-09-15 LAB
ALBUMIN UR-MCNC: NEGATIVE MG/DL
APPEARANCE UR: CLEAR
BASOPHILS # BLD AUTO: 0 10E9/L (ref 0–0.2)
BASOPHILS NFR BLD AUTO: 0.5 %
BILIRUB UR QL STRIP: NEGATIVE
COLOR UR AUTO: YELLOW
DIFFERENTIAL METHOD BLD: NORMAL
EOSINOPHIL # BLD AUTO: 0 10E9/L (ref 0–0.7)
EOSINOPHIL NFR BLD AUTO: 0.6 %
ERYTHROCYTE [DISTWIDTH] IN BLOOD BY AUTOMATED COUNT: 13.4 % (ref 10–15)
GLUCOSE UR STRIP-MCNC: >=1000 MG/DL
HBA1C MFR BLD: 9.7 % (ref 0–5.6)
HCT VFR BLD AUTO: 36.7 % (ref 35–47)
HGB BLD-MCNC: 11.9 G/DL (ref 11.7–15.7)
HGB UR QL STRIP: NEGATIVE
IMM GRANULOCYTES # BLD: 0 10E9/L (ref 0–0.4)
IMM GRANULOCYTES NFR BLD: 0.3 %
KETONES UR STRIP-MCNC: NEGATIVE MG/DL
LEUKOCYTE ESTERASE UR QL STRIP: NEGATIVE
LYMPHOCYTES # BLD AUTO: 1.9 10E9/L (ref 0.8–5.3)
LYMPHOCYTES NFR BLD AUTO: 31 %
MCH RBC QN AUTO: 31.2 PG (ref 26.5–33)
MCHC RBC AUTO-ENTMCNC: 32.4 G/DL (ref 31.5–36.5)
MCV RBC AUTO: 96 FL (ref 78–100)
MONOCYTES # BLD AUTO: 0.5 10E9/L (ref 0–1.3)
MONOCYTES NFR BLD AUTO: 8.3 %
NEUTROPHILS # BLD AUTO: 3.7 10E9/L (ref 1.6–8.3)
NEUTROPHILS NFR BLD AUTO: 59.3 %
NITRATE UR QL: NEGATIVE
NRBC # BLD AUTO: 0 10*3/UL
NRBC BLD AUTO-RTO: 0 /100
PH UR STRIP: 5.5 PH (ref 5–7)
PLATELET # BLD AUTO: 219 10E9/L (ref 150–450)
RBC # BLD AUTO: 3.82 10E12/L (ref 3.8–5.2)
SOURCE: ABNORMAL
SP GR UR STRIP: 1.01 (ref 1–1.03)
UROBILINOGEN UR STRIP-ACNC: 0.2 EU/DL (ref 0.2–1)
WBC # BLD AUTO: 6.2 10E9/L (ref 4–11)

## 2020-09-15 PROCEDURE — 90746 HEPB VACCINE 3 DOSE ADULT IM: CPT | Performed by: NURSE PRACTITIONER

## 2020-09-15 PROCEDURE — 36415 COLL VENOUS BLD VENIPUNCTURE: CPT | Performed by: NURSE PRACTITIONER

## 2020-09-15 PROCEDURE — 90471 IMMUNIZATION ADMIN: CPT | Performed by: NURSE PRACTITIONER

## 2020-09-15 PROCEDURE — 99607 MTMS BY PHARM ADDL 15 MIN: CPT | Performed by: PHARMACIST

## 2020-09-15 PROCEDURE — 99606 MTMS BY PHARM EST 15 MIN: CPT | Performed by: PHARMACIST

## 2020-09-15 PROCEDURE — 90682 RIV4 VACC RECOMBINANT DNA IM: CPT | Performed by: NURSE PRACTITIONER

## 2020-09-15 PROCEDURE — 99215 OFFICE O/P EST HI 40 MIN: CPT | Mod: 25 | Performed by: NURSE PRACTITIONER

## 2020-09-15 PROCEDURE — 85025 COMPLETE CBC W/AUTO DIFF WBC: CPT | Performed by: NURSE PRACTITIONER

## 2020-09-15 PROCEDURE — 90700 DTAP VACCINE < 7 YRS IM: CPT | Performed by: NURSE PRACTITIONER

## 2020-09-15 PROCEDURE — 80053 COMPREHEN METABOLIC PANEL: CPT | Performed by: NURSE PRACTITIONER

## 2020-09-15 PROCEDURE — G0008 ADMIN INFLUENZA VIRUS VAC: HCPCS | Mod: 59 | Performed by: NURSE PRACTITIONER

## 2020-09-15 PROCEDURE — G0010 ADMIN HEPATITIS B VACCINE: HCPCS | Mod: 59 | Performed by: NURSE PRACTITIONER

## 2020-09-15 PROCEDURE — 82010 KETONE BODYS QUAN: CPT | Performed by: NURSE PRACTITIONER

## 2020-09-15 PROCEDURE — 90834 PSYTX W PT 45 MINUTES: CPT | Performed by: SOCIAL WORKER

## 2020-09-15 PROCEDURE — 83036 HEMOGLOBIN GLYCOSYLATED A1C: CPT | Performed by: NURSE PRACTITIONER

## 2020-09-15 PROCEDURE — 81003 URINALYSIS AUTO W/O SCOPE: CPT | Performed by: NURSE PRACTITIONER

## 2020-09-15 PROCEDURE — 82043 UR ALBUMIN QUANTITATIVE: CPT | Performed by: NURSE PRACTITIONER

## 2020-09-15 ASSESSMENT — PAIN SCALES - GENERAL: PAINLEVEL: EXTREME PAIN (8)

## 2020-09-15 ASSESSMENT — MIFFLIN-ST. JEOR: SCORE: 1483.35

## 2020-09-15 NOTE — PROGRESS NOTES
MTM ENCOUNTER  SUBJECTIVE/OBJECTIVE:                           Nena Tang is a 60 year old female coming in for a follow-up visit. She was referred to me from Rowena Haas. Today's visit is a co-visit with PCP and [facility]. Today's visit is a follow-up MTM visit from 20     Chief Complaint: not feeling well, high glucose and depression, leg weakness.    Tobacco: She reports that she has never smoked. She has never used smokeless tobacco.  Alcohol: not currently using    Medication Adherence/Access:   No issues reported  Medications are set up in a pill box by HCRN  Her sister has been helping with administration.    Type 2 Diabetes: Pt currently taking Lantus 30u BID, Novolog 24u BID, Trulicity 1.5mg weekly. Pt is not experiencing side effects.    SMB times daily.   Ranges (pt reported):   Reports 400s most of the time the last couple weeks.  Patient is not experiencing hypoglycemia.  Recent symptoms of high blood sugar? fatigue, polyuria, headache.   Eye exam: up to date  Foot exam: up to date  ACEi/ARB: No.   Urine Albumin:         Lab Results   Component Value Date     UMALCR 4.47 2019      Aspirin: Taking 81mg daily and denies side effects  Diet/Exercise: eating twice a day, breakfast bagel and cream cheese lately.  Dinners sister is cooking, salad last night. No candy. Sometimes eating late at night but unable to come up with an example of what she eats.  Lab Results   Component Value Date    A1C 8.6 2020    A1C 6.2 2019    A1C 6.6 2019    A1C 7.6 05/15/2019    A1C 6.4 2018     Schizoaffective: Currently taking sertraline 200mg daily, Invega 9mg daily, amantadine 100mg BID, trazodone 100mg HS, clonazepam 0.5mg HS.  Reports she has been down.  Tearful during her visit today because not feeling well.  No longer walking with her sister for exercise. Sleep is ok. Has not been wearing CPAP.  See [facility] notes for additional details.     Hypertension: Current  medications include metoprolol XL 50mg daily, losartan 100mg daily.  Patient has BP monitored by HCRN.  Patient reports no current medication side effects.  BP Readings from Last 3 Encounters:   09/15/20 130/78   07/21/20 94/58   07/21/20 94/58      Today's Vitals: LMP 01/06/2015   BP Readings from Last 1 Encounters:   09/15/20 130/78     Pulse Readings from Last 1 Encounters:   09/15/20 92     Wt Readings from Last 1 Encounters:   09/15/20 217 lb 9 oz (98.7 kg)     Ht Readings from Last 1 Encounters:   09/15/20 5' (1.524 m)     Estimated body mass index is 42.49 kg/m  as calculated from the following:    Height as of an earlier encounter on 9/15/20: 5' (1.524 m).    Weight as of an earlier encounter on 9/15/20: 217 lb 9 oz (98.7 kg).    Temp Readings from Last 1 Encounters:   09/15/20 98.3  F (36.8  C) (Oral)         ASSESSMENT:                            Medication Adherence: No issues identified    Type 2 Diabetes: A1c not at goal <7%, reported glucoses are very elevated. Increase Lantus today, will call HCRN for additional information regarding adherence and work with RN on insulin titration.     Schizoaffective: depression likely exacerbated because of medical symptoms. Will continue to follow closely.     Hypertension: stable. Will plan to review home BP readings with RN.       PLAN:                            Increase Lantus to 34u BID    Called home care at [phone], LVM    I spent 30 minutes with this patient today. All changes were made via collaborative practice agreement with Rowena Haas. A copy of the visit note was provided to the patient's primary care provider.    Will follow up in 1 week.    The patient was given a summary of these recommendations. See Provider note/AVS from today.     Eugenia De Jesus, PharmD, BCACP

## 2020-09-15 NOTE — PATIENT INSTRUCTIONS
-Increase Lantus to 34 units twice per day.   -Will check labs today and call you with the results.   -Reinstated PT and OT.   -Call clinic with any questions or concerns.

## 2020-09-15 NOTE — PROGRESS NOTES
SUBJECTIVE:                                                    Nena Tang is a 60 year old female who presents to clinic today for the following health issues:  [facility]: [facility]  MTM: Eugenia    HPI  Patient reports that she has been having body aches, can't walk. Patient notes that she fell in her room out of her bed today. Had to be helped out with her sister. Feels like it has been worse over the last couple of weeks.  Reports that she is unable to go for walks because she feels like her legs are giving out on her. Patient had stopped working with PT.     Diabetes Follow-up  Patient reports that she has not been feeling well. Reports that she has been getting blood sugars greater than 400 most of the time.Taking her insulin twice per day. Has been forgetting to use her insulin, about twice per week. Reports that she is taking Insulin 30 in the morning. Using insulin 24 units together with her breakfast bagel, sometimes eating lunch. Reports no snacking. Drinking diet pepsi.   Had a salad with dinner- no insulin.   Taking her trulicity. No low sugars. Feels like she is tired all the time. States that her nurse comes every Wednesday. Denies any low blood sugars.   Not eating candy any more. Sometimes reports eating late at night.     BP Readings from Last 2 Encounters:   09/15/20 130/78   07/21/20 94/58     Hemoglobin A1C (%)   Date Value   06/30/2020 8.6 (H)   12/03/2019 6.2 (H)     LDL Cholesterol Calculated (mg/dL)   Date Value   12/22/2019 86   08/06/2019 81       Hypertension Follow-up  Patient reports some occasional headaches, denies any chest pain, some shortness of breath- with walking up the stairs. Denies any fever or chills. Reports that her BP's have been pretty good.     Do you check your blood pressure regularly outside of the clinic? Yes , weekly with the nurse visit.     Are your blood pressures ever more than 140 on the top number (systolic) OR more   than 90 on the bottom number (diastolic), for  example 140/90? Yes      Mental Health Follow up: Depression  Patient reports that she is feeling depressed, feels worse over the past couple of weeks. Patient notes that she has been talking to her family.     Status since last visit: some physical concerns.     See PHQ-9 for current symptoms.  Other associated symptoms: None    Complicating factors: not overall feeling well.   Significant life event:  No   Current substance abuse:  None  Anxiety or Panic symptoms:  No    Sleep - no issues with sleep, and no napping in the day time. Has not been using her CPAP since she moved in to the new place. States that she just does not want to use it.   Appetite - good, no concerns.  Exercise - unable due to feeling weak.     Street drugs - sober.   Marijuana - denies.     PHQ-9  English PHQ-9   Any Language               Problems taking medications regularly: No    Medication side effects: none    Diet: regular (no restrictions)    Social History     Tobacco Use     Smoking status: Never Smoker     Smokeless tobacco: Never Used   Substance Use Topics     Alcohol use: No     Frequency: Never     Comment: last month        Problem list and histories reviewed & adjusted, as indicated.  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 (H)     Illiterate     Irritable bowel syndrome     overweight - BMI >35     Takotsubo cardiomyopathy     CAD (coronary artery disease)     Restless legs syndrome (RLS)     CINDY (obstructive sleep apnea)- mild AHI 10.3     Verbal auditory hallucination     Chronic low back pain     Schizoaffective disorder, depressive type (H)     Migraine headache     HTN, goal below 140/90     Health Care Home     Lumbago     Cervicalgia     Cocaine abuse, episodic (H)     Suicidal ideation     Esophageal  reflux     Mild nonproliferative diabetic retinopathy (H)     Tardive dyskinesia     Alcohol use     Left cataract     Falls frequently     History of uterine cancer     Psychophysiological insomnia     Dysuria     Asymptomatic postmenopausal status     Abdominal pain, right lower quadrant     Infectious encephalopathy     Non-intractable vomiting with nausea     Thoughts of self harm     Gastroenteritis     Posttraumatic stress disorder     Cervical cancer screening     Type 2 diabetes mellitus with stage 3 chronic kidney disease, with long-term current use of insulin (H)     Positive AIDA (antinuclear antibody)     Hyperglycemia     Pneumonia     Depression with suicidal ideation     Mixed incontinence     Past Surgical History:   Procedure Laterality Date     C OOPHORECTORMY FOR MALIG, W/BX  1983    UTERINE     CATARACT IOL, RT/LT Bilateral 2017     COLONOSCOPY N/A 3/16/2017    Procedure: COLONOSCOPY;  Surgeon: Traci Gonzalez MD;  Location:  GI     Coronary CTA  5/21/2014     HYSTERECTOMY  1983    uterine cancer yearly pap's per provider.     LAPAROSCOPIC CHOLECYSTECTOMY  2008     PHACOEMULSIFICATION CLEAR CORNEA WITH STANDARD INTRAOCULAR LENS IMPLANT Left 5/5/2017    Procedure: PHACOEMULSIFICATION CLEAR CORNEA WITH STANDARD INTRAOCULAR LENS IMPLANT;  LEFT EYE PHACOEMULSIFICATION CLEAR CORNEA WITH STANDARD INTRAOCULAR LENS IMPLANT ;  Surgeon: Tyra Diaz MD;  Location:  [facility]     PHACOEMULSIFICATION CLEAR CORNEA WITH STANDARD INTRAOCULAR LENS IMPLANT Right 6/30/2017    Procedure: PHACOEMULSIFICATION CLEAR CORNEA WITH STANDARD INTRAOCULAR LENS IMPLANT;  RIGHT EYE PHACOEMULSIFICATION CLEAR CORNEA WITH STANDARD INTRAOCULAR LENS IMPLANT;  Surgeon: Tyra Diaz MD;  Location:  [facility]     RELEASE TRIGGER FINGER  10/11/2012    Left thumb. Procedure: RELEASE TRIGGER FINGER;  LEFT THUMB TRIGGER RELEASE;  Surgeon: Tay Langley MD;  Location:  [facility]     RELEASE TRIGGER FINGER Right 12/26/2016     Procedure: RELEASE TRIGGER FINGER;  Surgeon: Albino Castañeda MD;  Location: [facility] OR       Social History     Tobacco Use     Smoking status: Never Smoker     Smokeless tobacco: Never Used   Substance Use Topics     Alcohol use: No     Frequency: Never     Comment: last month     Family History   Problem Relation Age of Onset     Cancer Mother         BLADDER     Respiratory Mother         COPD     Gastrointestinal Disease Mother         CIRRHOSIS OF LI BOLIVAR     Alcohol/Drug Mother      Diabetes Mother      Hypertension Mother      Lipids Mother      C.A.D. Mother      Glaucoma Mother      Alcohol/Drug Sister      Mental Illness Sister      Alcohol/Drug Sister      Psychotic Disorder Sister      Cancer Maternal Grandmother         UNKNOWN TYPE     Cancer Brother         COLON     Cancer - colorectal Brother         IN HIS LATE 30S     Alcohol/Drug Brother          OF HEROIN OVERDOSE AT AGE 22 YRS     Macular Degeneration No family hx of            Current Outpatient Medications   Medication Sig Dispense Refill     acetaminophen (TYLENOL) 650 MG CR tablet Take 1 tablet (650 mg) by mouth every 8 hours as needed for mild pain or fever 120 tablet 1     alcohol swab prep pads Use to swab area of injection/rodolfo as directed. 100 each 3     amantadine (SYMMETREL) 100 MG capsule Take 1 capsule (100 mg) by mouth 2 times daily 60 capsule 3     aspirin (ASA) 81 MG EC tablet TAKE 1 TABLET (81MG) BY MOUTH DAILY 90 tablet 3     atorvastatin (LIPITOR) 80 MG tablet TAKE 1 TABLET (80MG) BY MOUTH DAILY 30 tablet 11     blood glucose (NO BRAND SPECIFIED) test strip Use to test blood sugar 4 times daily or as directed. 100 strip 11     clonazePAM (KLONOPIN) 0.5 MG tablet Take 1 tablet (0.5 mg) by mouth At Bedtime 30 tablet 0     diclofenac (VOLTAREN) 1 % topical gel Place 4 g onto the skin 4 times daily as needed for moderate pain       famotidine (PEPCID) 20 MG tablet Take 1 tablet (20 mg) by mouth daily 30 tablet 3      fluticasone-salmeterol (ADVAIR) 250-50 MCG/DOSE inhaler Inhale 1 puff into the lungs every 12 hours as needed       gabapentin (NEURONTIN) 300 MG capsule Take 1 capsule (300 mg) by mouth At Bedtime 30 capsule 1     hydrOXYzine (ATARAX) 25 MG tablet Take 1 tablet (25 mg) by mouth every 4 hours as needed for anxiety 60 tablet 1     insulin aspart (NOVOLOG FLEXPEN) 100 UNIT/ML pen Inject 24 units under the skin 3 times daily before meals as directed. Take an extra 10 units with sugary foods. 15 mL 1     insulin glargine (LANTUS PEN) 100 UNIT/ML pen Inject 30 Units Subcutaneous 2 times daily 21 mL 3     insulin pen needle (NOVOFINE 30) 30G X 8 MM miscellaneous USE 4 DAILY OR AS DIRECTED 400 each 0     ipratropium - albuterol 0.5 mg/2.5 mg/3 mL (DUONEB) 0.5-2.5 (3) MG/3ML neb solution Take 1 vial (3 mLs) by nebulization every 6 hours as needed for shortness of breath / dyspnea or wheezing 30 vial 0     losartan (COZAAR) 100 MG tablet Take 1 tablet (100 mg) by mouth daily 30 tablet 1     metoprolol succinate ER (TOPROL-XL) 25 MG 24 hr tablet Take 2 tablets (50 mg) by mouth daily 180 tablet 0     miconazole (MICATIN/MICRO GUARD) 2 % external powder Apply topically once as needed       nystatin (MYCOSTATIN) 727421 UNIT/GM external cream Apply topically 2 times daily 30 g 3     order for DME Equipment being ordered: Depends. 30 each 4     order for DME Equipment being ordered: CPAP Supplies. 1 each 0     paliperidone ER (INVEGA) 9 MG 24 hr tablet Take 1 tablet (9 mg) by mouth At Bedtime 60 tablet 1     senna-docusate (SENOKOT-S/PERICOLACE) 8.6-50 MG tablet Take 1 tablet by mouth 2 times daily as needed for constipation 60 tablet 10     sertraline (ZOLOFT) 100 MG tablet Take 2 tablets (200 mg) by mouth daily 60 tablet 3     thin (NO BRAND SPECIFIED) lancets Use with lanceting device. To accompany: Blood Glucose Monitor Brands: per insurance. 100 each 6     topiramate (TOPAMAX) 50 MG tablet Take 1 tablet (50 mg) by mouth At  Bedtime 30 tablet 3     traZODone (DESYREL) 100 MG tablet Take 1 tablet (100 mg) by mouth nightly as needed for sleep 30 tablet 3     TRULICITY 1.5 MG/0.5ML pen Inject 1.5 mg Subcutaneous once a week Every Friday 4 mL 3     zinc oxide (DESITIN) 40 % external ointment Apply topically as needed for dry skin or irritation 56 g 0     Allergies   Allergen Reactions     Imidazole Antifungals Hives     Tolerates diflucan     Ketoprofen Itching     Pruritis to topical     Lisinopril Hives     Metformin Other (See Comments)     Patient hospitalized for lactic acidosis - admitting provider suspectd caused by metformin     Metronidazole Hives     Posaconazole Hives     Tolerates diflucan     Recent Labs   Lab Test 06/30/20  1624 05/15/20  1537 12/22/19  0651 12/03/19  1143  08/06/19  1043  05/24/19  0916  08/06/18  1038   A1C 8.6*  --   --  6.2*  --  6.6*  --   --    < > 6.4*   LDL  --   --  86  --   --  81  --   --   --  84   HDL  --   --  42*  --   --  39*  --   --   --  46*   TRIG  --   --  132  --   --  131  --   --   --  215*   ALT 35 30 22 26   < >  --    < > 30   < >  --    CR 0.98 0.79 0.86 0.91   < >  --    < > 0.87   < >  --    GFRESTIMATED 63 82 74 69   < >  --    < > 73   < >  --    GFRESTBLACK 73 >90 85 80   < >  --    < > 85   < >  --    POTASSIUM 4.3 4.3 4.1 4.1   < >  --    < > 4.0   < >  --    TSH  --   --  1.23  --   --   --   --  1.62   < >  --     < > = values in this interval not displayed.      BP Readings from Last 3 Encounters:   09/15/20 130/78   07/21/20 94/58   07/21/20 94/58    Wt Readings from Last 3 Encounters:   09/15/20 98.7 kg (217 lb 9 oz)   07/21/20 100.8 kg (222 lb 5 oz)   07/21/20 100.8 kg (222 lb 5 oz)          Labs reviewed in EPIC  Problem list, Medication list, Allergies, and Medical/Social/Surgical histories reviewed in [facility] and updated as appropriate.      ROS: Constitutional, neuro, ENT, endocrine, pulmonary, cardiac, gastrointestinal, genitourinary, musculoskeletal, integument and  psychiatric systems are negative, except as otherwise noted above in the HPI.     OBJECTIVE:                                                    /78   Pulse 92   Temp 98.3  F (36.8  C) (Oral)   Resp 16   Ht 1.524 m (5')   Wt 98.7 kg (217 lb 9 oz)   LMP 01/06/2015   SpO2 99%   BMI 42.49 kg/m    Body mass index is 42.49 kg/m .    GENERAL: mild emotional distress and over weight  EYES: Eyes grossly normal to inspection, PERRL and conjunctivae and sclerae normal  RESP: lungs clear to auscultation - no rales, rhonchi or wheezes  CV: regular rate and rhythm, normal S1 S2, no S3 or S4, no murmur, click or rub, no peripheral edema and peripheral pulses strong  ABDOMEN: soft, nontender and bowel sounds normal  MS: no gross musculoskeletal defects noted, no edema  NEURO: Normal strength and tone, mentation intact and speech normal    Mental Status Assessment:  Appearance:   Appropriate   Eye Contact:   Good   Psychomotor Behavior: Normal   Attitude:   Cooperative   Orientation:   All  Speech   Rate / Production: Normal    Volume:  Normal   Mood:    Depressed  Sad   Affect:    Tearful Worrisome   Thought Content:  Clear   Thought Form:  Coherent  Logical   Insight:    Fair   Attention Span and Concentration: appropriate  Recent and Remote Memory:  intact  Fund of Knowledge: appropriate  Muscle Strength and Tone: generalized weakness  Suicidal Ideation: reports no thoughts, no intention    See [facility] notes     Diagnostic Test Results:  Results for orders placed or performed in visit on 09/15/20   Comprehensive metabolic panel (BMP + Alb, Alk Phos, ALT, AST, Total. Bili, TP)     Status: Abnormal   Result Value Ref Range    Sodium 131 (L) 133 - 144 mmol/L    Potassium 4.4 3.4 - 5.3 mmol/L    Chloride 104 94 - 109 mmol/L    Carbon Dioxide 23 20 - 32 mmol/L    Anion Gap 4 3 - 14 mmol/L    Glucose 306 (H) 70 - 99 mg/dL    Urea Nitrogen 14 7 - 30 mg/dL    Creatinine 0.85 0.52 - 1.04 mg/dL    GFR Estimate 74 >60  mL/min/[1.73_m2]    GFR Estimate If Black 86 >60 mL/min/[1.73_m2]    Calcium 9.4 8.5 - 10.1 mg/dL    Bilirubin Total 0.2 0.2 - 1.3 mg/dL    Albumin 3.6 3.4 - 5.0 g/dL    Protein Total 7.9 6.8 - 8.8 g/dL    Alkaline Phosphatase 102 40 - 150 U/L    ALT 29 0 - 50 U/L    AST 16 0 - 45 U/L   Hemoglobin A1c     Status: Abnormal   Result Value Ref Range    Hemoglobin A1C 9.7 (H) 0 - 5.6 %   CBC with platelets and differential     Status: None   Result Value Ref Range    WBC 6.2 4.0 - 11.0 10e9/L    RBC Count 3.82 3.8 - 5.2 10e12/L    Hemoglobin 11.9 11.7 - 15.7 g/dL    Hematocrit 36.7 35.0 - 47.0 %    MCV 96 78 - 100 fl    MCH 31.2 26.5 - 33.0 pg    MCHC 32.4 31.5 - 36.5 g/dL    RDW 13.4 10.0 - 15.0 %    Platelet Count 219 150 - 450 10e9/L    Diff Method Automated Method     % Neutrophils 59.3 %    % Lymphocytes 31.0 %    % Monocytes 8.3 %    % Eosinophils 0.6 %    % Basophils 0.5 %    % Immature Granulocytes 0.3 %    Nucleated RBCs 0 0 /100    Absolute Neutrophil 3.7 1.6 - 8.3 10e9/L    Absolute Lymphocytes 1.9 0.8 - 5.3 10e9/L    Absolute Monocytes 0.5 0.0 - 1.3 10e9/L    Absolute Eosinophils 0.0 0.0 - 0.7 10e9/L    Absolute Basophils 0.0 0.0 - 0.2 10e9/L    Abs Immature Granulocytes 0.0 0 - 0.4 10e9/L    Absolute Nucleated RBC 0.0    Ketone Beta-Hydroxybutyrate Quantitative     Status: None   Result Value Ref Range    Ketone Quantitative Unsatisfactory specimen - improperly centrifuged 0.0 - 0.6 mmol/L   UA without Microscopic     Status: Abnormal   Result Value Ref Range    Color Urine Yellow     Appearance Urine Clear     Glucose Urine >=1000 (A) NEG^Negative mg/dL    Bilirubin Urine Negative NEG^Negative    Ketones Urine Negative NEG^Negative mg/dL    Specific Gravity Urine 1.015 1.003 - 1.035    Blood Urine Negative NEG^Negative    pH Urine 5.5 5.0 - 7.0 pH    Protein Albumin Urine Negative NEG^Negative mg/dL    Urobilinogen Urine 0.2 0.2 - 1.0 EU/dL    Nitrite Urine Negative NEG^Negative    Leukocyte Esterase Urine  Negative NEG^Negative    Source Midstream Urine    Albumin Random Urine Quantitative with Creat Ratio     Status: None   Result Value Ref Range    Creatinine Urine 55 mg/dL    Albumin Urine mg/L 7 mg/L    Albumin Urine mg/g Cr 12.01 0 - 25 mg/g Cr        ASSESSMENT/PLAN:                                                    1. Type 2 diabetes mellitus with stage 3 chronic kidney disease, with long-term current use of insulin (H)  Uncontrolled Diabetes. Patient has been missing some insulin doses, and reporting high blood sugars. Was seen in the ED 5/15/2020 with the same concerns and sent home to follow-up in clinic.   - Eugenia (MTM) will reach out to the home care nurse and try to work on getting her diabetes controlled and hopefully Nena will start feeling better.   - Comprehensive metabolic panel (BMP + Alb, Alk Phos, ALT, AST, Total. Bili, TP)  - Hemoglobin A1c  - CBC with platelets and differential  - Ketone Beta-Hydroxybutyrate Quantitative  - UA without Microscopic  - Albumin Random Urine Quantitative with Creat Ratio  - Ketone Beta-Hydroxybutyrate Quantitative; Future    2. HTN, goal below 140/90  Stable. Patient denies any headaches, chest pain, heart palpitations, dizziness or syncopal episodes. Patient has been able to maintain her BP withuout the diuretic.   BP Readings from Last 3 Encounters:   09/15/20 130/78   07/21/20 94/58   07/21/20 94/58   - Continue to monitor and continue on losartan, metoprolol.     3. Schizoaffective disorder, depressive type (H)  Mood is down, reports that she is feeling unmotivated to do anything or get anything done. Seems to be worse with her uncontrolled health. Patient reports that she takes her medications everyday as prescribed.   - Encouraged patient to follow-up with [facility]) for therapy.     4. Generalized muscle weakness  Patient reporting that she has no energy or strength to walk and really struggling with it. She had previously cancelled her home PT, but we  discussed the importance of this and we will plan to resume her PT and OT moving forward.   - Comprehensive metabolic panel (BMP + Alb, Alk Phos, ALT, AST, Total. Bili, TP)  - Hemoglobin A1c  - CBC with platelets and differential  - HOME CARE NURSING REFERRAL    5. Need for prophylactic vaccination and inoculation against influenza  Routine annual vaccination.   - INFLUENZA QUAD, RECOMBINANT, P-FREE (RIV4) (FLUBLOCK) [89060]  - Vaccine Administration, Initial [62488]    6. Need for vaccination  Routine vaccination.   - HEPATITIS B VACCINE,  ADULT  [05626]  - DTaP IMMUNIZATION, IM [76327]        Patient Instructions   -Increase Lantus to 34 units twice per day.   -Will check labs today and call you with the results.   -Reinstated PT and OT.   -Call clinic with any questions or concerns.       I spent Greater than 40 minutes was spent face to face with Nena Tang, with greater than 50 % in counselling and consultation about Uncontrolled diabetes, hypertension,     Rowena ART Rayo [facility] PRIMARY CARE

## 2020-09-15 NOTE — Clinical Note
FYI - she only has 3 readings on her glucometer from this past week. RN talking to her sister about helping with adherence.

## 2020-09-15 NOTE — PROGRESS NOTES
[facility] - Integrated Primary Care   September 15, 2020    Behavioral Health Clinician Progress Note    Patient Name: Nena Tang           Service Type:  Individual      Service Location:   Face to Face in Clinic     Session Start Time: 1:40pm  Session End Time:  2:20pm      Session Length: 38 - 52      Attendees: Patient, PCP and MTM    Visit Activities (Refresh list every visit): [facility] [place] [facility]     Diagnostic Assessment Date: 1-23-18 Updated DA completed December 12, 2019  Treatment Plan Review Date: 11-  CGI date completed: 8-  See Flowsheets for today's PHQ-9 and TAMIA-7 results  Previous PHQ-9:   PHQ-9 SCORE 3/13/2018 10/26/2018 5/7/2019   PHQ-9 Total Score - - -   PHQ-9 Total Score - - -   PHQ-9 Total Score 14 20 22     Previous TAMIA-7:   TAMIA-7 SCORE 2/3/2017 3/13/2018 10/26/2018   Total Score - - -   Total Score 0 17 19   Total Score - - -       ALEXANDRA LEVEL:  ALEXANDRA Score (Last Two) 8/30/2011 6/9/2014   ALEXANDRA Raw Score 42 37   Activation Score 66 49.9   ALEXANDRA Level 3 2       DATA  Extended Session (60+ minutes): No  Interactive Complexity: No  Crisis: No  [facility] Patient: No    Treatment Objective(s) Addressed in This Session:  Target Behavior(s): disease management/lifestyle changes mental health    Depressed Mood: Increase interest, engagement, and pleasure in doing things  Decrease frequency and intensity of feeling down, depressed, hopeless  Improve quantity and quality of night time sleep / decrease daytime naps  Identify negative self-talk and behaviors: challenge core beliefs, myths, and actions  Improve concentration, focus, and mindfulness in daily activities   Feel less fidgety, restless or slow in daily activities / interpersonal interactions  Anxiety: will experience a reduction in anxiety and will develop more effective coping skills to manage anxiety symptoms  Thought Disturbance: will develop skills to more effectively manage symptoms and will continue to take medications as  prescribed  Psychological distress related to Diabetes    Current Stressors / Issues:    The patient was seen by [facility] [place] per the request of the PCP-she has been recording high blood sugars-she stated that she is taking all of her medications-sometimes she is forgetting to take her insuline-breakfast, sometimes lunch and dinner most nights-she reported that she is feeling depressed-was emotional about having high blood sugars-she has been feeling tired during the day-in home nurse every Wednesday-her sister has been helping with health management-she has been feeling down and unmotivated feeling in pain-she has had some difficulty with finding ronal-she has been having time with her son she talked about her plan with the MTMand her grandchildren and this has helped some with the mood-she wants her blood sugars to get better-this would help her mood-no hallucinations-she and MTM talked about a plan-regarding sleeping the patient reported she is having good sleeping-she is sober of chemical use and she stated this is not a challenge at this time-She talked about her plan with the MTM and they worked out the having the patient identify her part in the plan-she will talk to the patient once a week-she was encouraged to get back to walking-      She wants to do drop in center and will have her connect with Adult day treatment    Wakes up in the morning and before bed      Progress on Treatment Objective(s) / Homework:  Minimal progress - PREPARATION (Decided to change - considering how); Intervened by negotiating a change plan and determining options / strategies for behavior change, identifying triggers, exploring social supports, and working towards setting a date to begin behavior change    Motivational Interviewing    MI Intervention: Expressed Empathy/Understanding, Supported Autonomy, Collaboration, Evocation, Permission to raise concern or advise, Open-ended questions, Reflections: simple and complex, Rolled with  resistance: Emphasized patient autonomy, Simple reflection and Evoked patient agenda and Change talk (evoked)     Change Talk Expressed by the Patient: Desire to change Ability to change Reasons to change Need to change Committment to change Activation Taking steps    Provider Response to Change Talk: E - Evoked more info from patient about behavior change, A - Affirmed patient's thoughts, decisions, or attempts at behavior change, R - Reflected patient's change talk and S - Summarized patient's change talk statements      Care Plan review completed: No    Medication Review:  No changes to current psychiatric medication(s)     Medication Compliance:  NA    Changes in Health Issues:   None reported     Chemical Use Review:   Substance Use: Chemical use reviewed, no active concerns identified      Tobacco Use: No current tobacco use.      Assessment: Current Emotional / Mental Status (status of significant symptoms):  Risk status (Self / Other harm or suicidal ideation)  Patient has had a history of suicidal ideation: yes  Patient denies current fears or concerns for personal safety.  Patient denies current or recent suicidal ideation or behaviors.  Patient denies current or recent homicidal ideation or behaviors.  Patient denies current or recent self injurious behavior or ideation.  Patient denies other safety concerns.  A safety and risk management plan has not been developed at this time, however patient was encouraged to call [place] [phone] should there be a change in any of these risk factors.    Appearance:   Appropriate   Eye Contact:   Good   Psychomotor Behavior: Retarded (Slowed)   Attitude:   Cooperative   Orientation:   All  Speech   Rate / Production: Normal    Volume:  Normal   Mood:    Anxious  Normal Sad   Affect:    Appropriate  Blunted  Worrisome   Thought Content:  Clear   Thought Form:  Coherent  Goal Directed  Logical   Insight:    Fair     Diagnoses:  1. Schizoaffective disorder, bipolar  type (H)    2. Posttraumatic stress disorder        Collateral Reports Completed:  Not Applicable    Plan: (Homework, other):  Patient was given information about behavioral services and encouraged to schedule a follow up appointment with the clinic [facility] as needed to work on helping the patient with management of mood states and to work on stress management around her grieving process-also to help the patient with her behavioral needs to comply with treatment medical recommendations.  She was also given information about mental health symptoms and treatment options .  CD Recommendations: Maintain Sobriety.    Richardson Lopez, [facility], [facility]                                                    Treatment Plan    Client's Name: Nena Tang  YOB: 1960    Date: Reviewed/updated on 8-    DSM5 Diagnoses: (Sustained by DSM5 Criteria Listed Above)  Diagnoses:  295.70 (F25.0), Schizoaffective disorder, depressive type; 309.81 (F43.10) Posttraumatic Stress Disorder with panic specifier, history of chemical dependency issues (polysubstance dependence)  Psychosocial & Contextual Factors: Academics / Education - yes  Activities of Daily Living - yes  Financial management yes  Follow through with Medical recommendations - yes  Occupational / Vocational - yes  Social / Relational - yes, grief and loss  WHODAS Score: 30      Referral / Collaboration:  Referral to another professional/service is not indicated at this time..    Anticipated number of session or this episode of care: 20      MeasurableTreatment Goal(s) related to diagnosis / functional impairment(s)  Goal 1: Client will improve her ability to manage anxiety and mood states.     I will know I've met my goal when the man does not paralyze the me with fear.     Objective #A (Client Action)    Client will increase understanding of steps in the grief process.  Status: Continued - Date(s): 8- she stated that she still has her husbands ashes-she was  reminded to make the decision that is best for her with her grieving-  Intervention(s)  Therapist will teach emotional recognition/identification. teach the navigation of grieving encouraging acceptance and adjustment.    Objective #B  Client will Decrease frequency and intensity of feeling down, depressed, hopeless  Decrease thoughts that you'd be better off dead or of suicide / self-harm.  Status: Continued - Date(s):  8- regarding mood the patient reported having stable mood-she spends a lot of time with her sister-she has a PCA worker-this worker is helpful-no suicidal thoughts-she is taking her medications as prescribed-she spends time with her sons-she has not been able to go to the drop in center and she misses this place-    Intervention(s)  Therapist will teach emotional regulation skills. with the use of mindful coping strategies and open communication of feelings and thoughts (getting it out to another person)    Client has reviewed and agreed to the above plan.      Richardson Lopez, [facility]  8-        ______________________________________________________________________

## 2020-09-16 LAB
ALBUMIN SERPL-MCNC: 3.6 G/DL (ref 3.4–5)
ALP SERPL-CCNC: 102 U/L (ref 40–150)
ALT SERPL W P-5'-P-CCNC: 29 U/L (ref 0–50)
ANION GAP SERPL CALCULATED.3IONS-SCNC: 4 MMOL/L (ref 3–14)
AST SERPL W P-5'-P-CCNC: 16 U/L (ref 0–45)
BILIRUB SERPL-MCNC: 0.2 MG/DL (ref 0.2–1.3)
BUN SERPL-MCNC: 14 MG/DL (ref 7–30)
CALCIUM SERPL-MCNC: 9.4 MG/DL (ref 8.5–10.1)
CHLORIDE SERPL-SCNC: 104 MMOL/L (ref 94–109)
CO2 SERPL-SCNC: 23 MMOL/L (ref 20–32)
CREAT SERPL-MCNC: 0.85 MG/DL (ref 0.52–1.04)
CREAT UR-MCNC: 55 MG/DL
GFR SERPL CREATININE-BSD FRML MDRD: 74 ML/MIN/{1.73_M2}
GLUCOSE SERPL-MCNC: 306 MG/DL (ref 70–99)
KETONES BLD-SCNC: NORMAL MMOL/L (ref 0–0.6)
MICROALBUMIN UR-MCNC: 7 MG/L
MICROALBUMIN/CREAT UR: 12.01 MG/G CR (ref 0–25)
POTASSIUM SERPL-SCNC: 4.4 MMOL/L (ref 3.4–5.3)
PROT SERPL-MCNC: 7.9 G/DL (ref 6.8–8.8)
SODIUM SERPL-SCNC: 131 MMOL/L (ref 133–144)

## 2020-09-16 NOTE — PROGRESS NOTES
Received call back from RN Lanny    Direct number [phone]    No BP concerns, did not have values available to review. No medication adherence concerns with oral meds.    Reviewed glucose readings and only 3 recent values:  Fasting 300s on 9/12  ,417     Plan:  RN will talk to Nena's sister about helping with insulin and glucose testing adherence.    RN will call next week while she is at Nena's apartment to review glucose readings    Follow-up in 1 week    Eugenia De Jesus, KiD, BCACP

## 2020-09-17 ENCOUNTER — TELEPHONE (OUTPATIENT)
Dept: FAMILY MEDICINE | Facility: CLINIC | Age: 60
End: 2020-09-17

## 2020-09-17 NOTE — TELEPHONE ENCOUNTER
[facility] now requests orders and shares plan of care/discharge summaries for some patients through "[employer].  Please REPLY TO THIS MESSAGE OR ROUTE BACK TO THE AUTHOR in order to give authorization for orders when needed.  This is considered a verbal order, you will still receive a faxed copy of orders for signature.  Thank you for your assistance in improving collaboration for our patients.    ORDER  PT 2w3 with POC to include there ex for ROM and strengthening, bed mobility and transfer training, gait training including stairs, balance/neuro nichol, and instruction in HEP.    OT Eval and treat for cognition, UE exercise, ADLs, and DME needs

## 2020-09-18 DIAGNOSIS — N18.30 TYPE 2 DIABETES MELLITUS WITH STAGE 3 CHRONIC KIDNEY DISEASE, WITH LONG-TERM CURRENT USE OF INSULIN (H): ICD-10-CM

## 2020-09-18 DIAGNOSIS — Z79.4 TYPE 2 DIABETES MELLITUS WITH STAGE 3 CHRONIC KIDNEY DISEASE, WITH LONG-TERM CURRENT USE OF INSULIN (H): ICD-10-CM

## 2020-09-18 DIAGNOSIS — E11.22 TYPE 2 DIABETES MELLITUS WITH STAGE 3 CHRONIC KIDNEY DISEASE, WITH LONG-TERM CURRENT USE OF INSULIN (H): ICD-10-CM

## 2020-09-18 LAB — KETONES BLD-SCNC: NORMAL MMOL/L (ref 0–0.6)

## 2020-09-18 PROCEDURE — 36415 COLL VENOUS BLD VENIPUNCTURE: CPT | Performed by: NURSE PRACTITIONER

## 2020-09-18 PROCEDURE — 82010 KETONE BODYS QUAN: CPT | Mod: 90 | Performed by: NURSE PRACTITIONER

## 2020-09-18 PROCEDURE — 99000 SPECIMEN HANDLING OFFICE-LAB: CPT | Performed by: NURSE PRACTITIONER

## 2020-09-20 LAB — B-OH-BUTYR SERPL-MCNC: 0.8 MG/DL (ref 0–3)

## 2020-09-22 ENCOUNTER — MEDICAL CORRESPONDENCE (OUTPATIENT)
Dept: HEALTH INFORMATION MANAGEMENT | Facility: CLINIC | Age: 60
End: 2020-09-22

## 2020-09-22 ENCOUNTER — TELEPHONE (OUTPATIENT)
Dept: PHARMACY | Facility: CLINIC | Age: 60
End: 2020-09-22

## 2020-09-22 NOTE — TELEPHONE ENCOUNTER
Received call from Lanny DIA with reported glucose values.  Pt has been checking glucose 0-1 times daily.  Reports the following values:  PM: 274,295,340,141   Fasting today 212    RN reports she spoke with patient and sister about checking glucose TID with insulin injections.  Sister has been reminding pt to adhere to her regimen but pt does not always follow through.     Attempted to reach pt, phone went directly to [facility].  [facility] for her to call clinic for further discussion.     Eugenia De Jesus, PharmD, BCACP

## 2020-09-22 NOTE — TELEPHONE ENCOUNTER
2nd attempt to reach patient; no answer and LVM.  Will try her again tomorrow    Eugenia De Jesus, KiD, BCACP

## 2020-09-23 NOTE — TELEPHONE ENCOUNTER
Called pt, able to talk to her about readings. States she is having a hard time remembering to check her glucose and take insulin in the PM.    Agrees to let her sister help with insulin and SMBG.  Will follow-up in 1 week.    Eugenia De Jesus, KiD, BCACP

## 2020-09-25 ENCOUNTER — TELEPHONE (OUTPATIENT)
Dept: FAMILY MEDICINE | Facility: CLINIC | Age: 60
End: 2020-09-25

## 2020-09-25 NOTE — TELEPHONE ENCOUNTER
Prior Authorization Retail Medication Request    Medication/Dose: hydrOXYzine (VISTARIL) 25 MG capsule   ICD code (if different than what is on RX):    Previously Tried and Failed:    Rationale:      Insurance Name:  Covermymeds  Insurance ID:  [id number]      Pharmacy Information (if different than what is on RX)  Name:    Phone:

## 2020-09-25 NOTE — TELEPHONE ENCOUNTER
Central Prior Authorization Team   Phone: [phone]      Prior Authorization Approval    Authorization Effective Date: 8/26/2020  Authorization Expiration Date: 9/25/2021  Medication: hydrOXYzine (VISTARIL) 25 MG capsule - APPROVED  Approved Dose/Quantity: 60 FOR 10  Reference #:     Insurance Company: [employer] - Phone [phone] Fax [phone]  Expected CoPay:       CoPay Card Available:      Foundation Assistance Needed:    Which Pharmacy is filling the prescription (Not needed for infusion/clinic administered): [facility] [place] - [address] [place] [address]  Pharmacy Notified: Yes  Patient Notified: Yes (**Instructed pharmacy to notify patient when script is ready to /ship.**)

## 2020-10-01 ENCOUNTER — TELEPHONE (OUTPATIENT)
Dept: PHARMACY | Facility: CLINIC | Age: 60
End: 2020-10-01

## 2020-10-01 NOTE — TELEPHONE ENCOUNTER
Received VM from patient's home care RN with glucose readings. Pt has continued to miss glucose checks and testing 0-1 times per day.      Fastin  PM: 274,295,340,141    Called patient to discuss. She has been eating dinner, then taking Novolog and eating.  She denies any missed injections, though does admit to forgetting testing before breakfast.    Plan:  1. Work on glucose test THEN shot THEN eat   2. Suggested keeping a glucose logbook, writing down her numbers this week    No other concerns.  Will follow-up next week after call from RN    Eugenia De Jesus, PharmD, BCACP

## 2020-10-04 ENCOUNTER — HOSPITAL ENCOUNTER (EMERGENCY)
Facility: CLINIC | Age: 60
Discharge: HOME | End: 2020-10-04
Attending: EMERGENCY MEDICINE | Admitting: EMERGENCY MEDICINE
Payer: COMMERCIAL

## 2020-10-04 VITALS
HEART RATE: 80 BPM | OXYGEN SATURATION: 97 % | TEMPERATURE: 98.3 F | SYSTOLIC BLOOD PRESSURE: 143 MMHG | RESPIRATION RATE: 16 BRPM | DIASTOLIC BLOOD PRESSURE: 72 MMHG

## 2020-10-04 DIAGNOSIS — N39.0 URINARY TRACT INFECTION WITHOUT HEMATURIA, SITE UNSPECIFIED: ICD-10-CM

## 2020-10-04 DIAGNOSIS — R73.9 HYPERGLYCEMIA: ICD-10-CM

## 2020-10-04 LAB
ALBUMIN SERPL-MCNC: 3.4 G/DL (ref 3.4–5)
ALBUMIN UR-MCNC: NEGATIVE MG/DL
ALP SERPL-CCNC: 121 U/L (ref 40–150)
ALT SERPL W P-5'-P-CCNC: 29 U/L (ref 0–50)
ANION GAP SERPL CALCULATED.3IONS-SCNC: 7 MMOL/L (ref 3–14)
APPEARANCE UR: CLEAR
AST SERPL W P-5'-P-CCNC: 15 U/L (ref 0–45)
BACTERIA #/AREA URNS HPF: ABNORMAL /HPF
BASE DEFICIT BLDV-SCNC: 3.6 MMOL/L
BASOPHILS # BLD AUTO: 0 10E9/L (ref 0–0.2)
BASOPHILS NFR BLD AUTO: 0.4 %
BILIRUB SERPL-MCNC: 0.5 MG/DL (ref 0.2–1.3)
BILIRUB UR QL STRIP: NEGATIVE
BUN SERPL-MCNC: 17 MG/DL (ref 7–30)
CALCIUM SERPL-MCNC: 9.2 MG/DL (ref 8.5–10.1)
CHLORIDE SERPL-SCNC: 104 MMOL/L (ref 94–109)
CO2 SERPL-SCNC: 23 MMOL/L (ref 20–32)
COLOR UR AUTO: ABNORMAL
CREAT SERPL-MCNC: 0.86 MG/DL (ref 0.52–1.04)
DIFFERENTIAL METHOD BLD: ABNORMAL
EOSINOPHIL # BLD AUTO: 0.1 10E9/L (ref 0–0.7)
EOSINOPHIL NFR BLD AUTO: 1.3 %
ERYTHROCYTE [DISTWIDTH] IN BLOOD BY AUTOMATED COUNT: 13 % (ref 10–15)
GFR SERPL CREATININE-BSD FRML MDRD: 73 ML/MIN/{1.73_M2}
GLUCOSE BLDC GLUCOMTR-MCNC: 285 MG/DL (ref 70–99)
GLUCOSE BLDC GLUCOMTR-MCNC: 360 MG/DL (ref 70–99)
GLUCOSE SERPL-MCNC: 346 MG/DL (ref 70–99)
GLUCOSE UR STRIP-MCNC: >1000 MG/DL
HCO3 BLDV-SCNC: 23 MMOL/L (ref 21–28)
HCT VFR BLD AUTO: 34.5 % (ref 35–47)
HGB BLD-MCNC: 11 G/DL (ref 11.7–15.7)
HGB UR QL STRIP: ABNORMAL
IMM GRANULOCYTES # BLD: 0 10E9/L (ref 0–0.4)
IMM GRANULOCYTES NFR BLD: 0.4 %
KETONES BLD-SCNC: 0.1 MMOL/L (ref 0–0.6)
KETONES UR STRIP-MCNC: NEGATIVE MG/DL
LEUKOCYTE ESTERASE UR QL STRIP: ABNORMAL
LYMPHOCYTES # BLD AUTO: 2.1 10E9/L (ref 0.8–5.3)
LYMPHOCYTES NFR BLD AUTO: 30.6 %
MAGNESIUM SERPL-MCNC: 1.8 MG/DL (ref 1.6–2.3)
MCH RBC QN AUTO: 31.4 PG (ref 26.5–33)
MCHC RBC AUTO-ENTMCNC: 31.9 G/DL (ref 31.5–36.5)
MCV RBC AUTO: 99 FL (ref 78–100)
MONOCYTES # BLD AUTO: 0.7 10E9/L (ref 0–1.3)
MONOCYTES NFR BLD AUTO: 9.6 %
NEUTROPHILS # BLD AUTO: 4 10E9/L (ref 1.6–8.3)
NEUTROPHILS NFR BLD AUTO: 57.7 %
NITRATE UR QL: POSITIVE
NRBC # BLD AUTO: 0 10*3/UL
NRBC BLD AUTO-RTO: 0 /100
O2/TOTAL GAS SETTING VFR VENT: ABNORMAL %
OXYHGB MFR BLDV: 84 %
PCO2 BLDV: 46 MM HG (ref 40–50)
PH BLDV: 7.3 PH (ref 7.32–7.43)
PH UR STRIP: 5.5 PH (ref 5–7)
PLATELET # BLD AUTO: 181 10E9/L (ref 150–450)
PO2 BLDV: 54 MM HG (ref 25–47)
POTASSIUM SERPL-SCNC: 4.2 MMOL/L (ref 3.4–5.3)
PROT SERPL-MCNC: 7.4 G/DL (ref 6.8–8.8)
RBC # BLD AUTO: 3.5 10E12/L (ref 3.8–5.2)
RBC #/AREA URNS AUTO: 2 /HPF (ref 0–2)
SODIUM SERPL-SCNC: 134 MMOL/L (ref 133–144)
SOURCE: ABNORMAL
SP GR UR STRIP: 1.03 (ref 1–1.03)
SQUAMOUS #/AREA URNS AUTO: 2 /HPF (ref 0–1)
TROPONIN I SERPL-MCNC: <0.015 UG/L (ref 0–0.04)
UROBILINOGEN UR STRIP-MCNC: NORMAL MG/DL (ref 0–2)
WBC # BLD AUTO: 7 10E9/L (ref 4–11)
WBC #/AREA URNS AUTO: 57 /HPF (ref 0–5)

## 2020-10-04 PROCEDURE — 87088 URINE BACTERIA CULTURE: CPT | Performed by: EMERGENCY MEDICINE

## 2020-10-04 PROCEDURE — 96360 HYDRATION IV INFUSION INIT: CPT

## 2020-10-04 PROCEDURE — 82805 BLOOD GASES W/O2 SATURATION: CPT | Performed by: EMERGENCY MEDICINE

## 2020-10-04 PROCEDURE — 81001 URINALYSIS AUTO W/SCOPE: CPT | Performed by: EMERGENCY MEDICINE

## 2020-10-04 PROCEDURE — 87086 URINE CULTURE/COLONY COUNT: CPT | Performed by: EMERGENCY MEDICINE

## 2020-10-04 PROCEDURE — 93005 ELECTROCARDIOGRAM TRACING: CPT

## 2020-10-04 PROCEDURE — 96372 THER/PROPH/DIAG INJ SC/IM: CPT

## 2020-10-04 PROCEDURE — 999N001017 HC STATISTIC GLUCOSE BY METER IP

## 2020-10-04 PROCEDURE — 258N000003 HC RX IP 258 OP 636: Performed by: EMERGENCY MEDICINE

## 2020-10-04 PROCEDURE — 99284 EMERGENCY DEPT VISIT MOD MDM: CPT | Mod: 25

## 2020-10-04 PROCEDURE — 87186 SC STD MICRODIL/AGAR DIL: CPT | Performed by: EMERGENCY MEDICINE

## 2020-10-04 PROCEDURE — 84484 ASSAY OF TROPONIN QUANT: CPT | Performed by: EMERGENCY MEDICINE

## 2020-10-04 PROCEDURE — 82010 KETONE BODYS QUAN: CPT | Performed by: EMERGENCY MEDICINE

## 2020-10-04 PROCEDURE — 83735 ASSAY OF MAGNESIUM: CPT | Performed by: EMERGENCY MEDICINE

## 2020-10-04 PROCEDURE — 85025 COMPLETE CBC W/AUTO DIFF WBC: CPT | Performed by: EMERGENCY MEDICINE

## 2020-10-04 PROCEDURE — 80053 COMPREHEN METABOLIC PANEL: CPT | Performed by: EMERGENCY MEDICINE

## 2020-10-04 RX ORDER — ONDANSETRON 4 MG/1
4 TABLET, ORALLY DISINTEGRATING ORAL EVERY 8 HOURS PRN
Qty: 10 TABLET | Refills: 0 | Status: SHIPPED | OUTPATIENT
Start: 2020-10-04 | End: 2020-10-07

## 2020-10-04 RX ORDER — CEPHALEXIN 500 MG/1
500 CAPSULE ORAL 2 TIMES DAILY
Qty: 14 CAPSULE | Refills: 0 | Status: SHIPPED | OUTPATIENT
Start: 2020-10-04 | End: 2020-10-11

## 2020-10-04 RX ADMIN — SODIUM CHLORIDE 1000 ML: 9 INJECTION, SOLUTION INTRAVENOUS at 13:01

## 2020-10-04 ASSESSMENT — ENCOUNTER SYMPTOMS
DYSURIA: 0
RHINORRHEA: 0
NAUSEA: 1
BLOOD IN STOOL: 0
FATIGUE: 1
FEVER: 0
DIARRHEA: 1
CHILLS: 0
VOMITING: 0
COUGH: 0

## 2020-10-04 NOTE — ED AVS SNAPSHOT
[facility] Emergency Dept  [address]  [facility] [address]  Phone: [phone]  Fax: [phone]                                    Nena Tang   MRN: [id number]    Department: [facility] Emergency Dept   Date of Visit: 10/4/2020           After Visit Summary Signature Page    I have received my discharge instructions, and my questions have been answered. I have discussed any challenges I see with this plan with the nurse or doctor.    ..........................................................................................................................................  Patient/Patient Representative Signature      ..........................................................................................................................................  Patient Representative Print Name and Relationship to Patient    ..................................................               ................................................  Date                                   Time    ..........................................................................................................................................  Reviewed by Signature/Title    ...................................................              ..............................................  Date                                               Time          22EPIC Rev 08/18

## 2020-10-04 NOTE — ED TRIAGE NOTES
Blood sugar spiked last night. Patient's sugar was 507 at home. Has blurry vision, weakness, dizziness, and a mild headache (which is a typical symptom for her when her blood sugar goes high). ABCDs intact.      PSS-3 screen reveals depression over the past 2 weeks, but denies thoughts of self-harm

## 2020-10-04 NOTE — ED PROVIDER NOTES
History     Chief Complaint:  Hyperglycemia, Dizziness, and Generalized Weakness    HPI   Nena Tang is a 60 year old female with a history of Type II diabetes, CAD, hypertension, and uterine cancer (in remission) who presents with hyperglycemia. The patient reports having hyperglycemia in the 400s for the past few weeks. Today, she measured her blood sugar to be 507. She feels fatigued, and has mild nausea and nonbloody diarrhea described as roughly 2 episodes daily. She states she has not been taking her insulin as normal because she forgot to take it this weekend. She denies cough, runny nose, fever, chills, vomiting, dysuria, abdominal pain, headache, chest pain, known sick contacts, or alcohol use.    Allergies:  Imidazole antifungals  Ketoprofen  Lisinopril  Metformin  Metronidazole  Posaconazole     Medications:    Symmetrel  Aspirin 81 mg  Lipitor  Pepcid  Advair inhaler  Gabapentin  Atarax  Vistaril  Insulin aspart  Insulin glargine  Cozaar  Metoprolol  Zoloft  Desyrel    Past Medical History:    CAD  Chronic low back pain  Cocaine abuse, in remission  Hyperlipidemia   Hypertension   IBS  Migraines  Depression   Obesity  CINDY  Schizoaffective disorder  Type II diabetes   Uterine cancer  Vitamin B12 deficiency  Restless leg syndrome  PTSD    Past Surgical History:    Oophorectomy  Cataract removal, bilateral  Hysterectomy  Cholecystectomy  Release trigger finger    Family History:    Mother: Bladder cancer, COPD, diabetes, hypertension, hyperlipidemia, CAD  Sister: Mental illness, alcohol/drug  Brother: colorectal cancer    Social History:  Smoking status: No  Alcohol use: No  Drug use: No  Patient presents alone.  PCP: Rowena Haas    Marital Status:        Review of Systems   Constitutional: Positive for fatigue. Negative for chills and fever.   HENT: Negative for rhinorrhea.    Respiratory: Negative for cough.    Cardiovascular: Negative for chest pain.   Gastrointestinal: Positive for  diarrhea and nausea. Negative for blood in stool and vomiting.   Genitourinary: Negative for dysuria.   All other systems reviewed and are negative.    Physical Exam     Patient Vitals for the past 24 hrs:   BP Temp Temp src Pulse Resp SpO2   10/04/20 1330 (!) 143/72 -- -- 80 -- 97 %   10/04/20 1300 132/71 -- -- 77 -- 95 %   10/04/20 1057 (!) 158/82 98.3  F (36.8  C) Oral 79 16 99 %      Physical Exam  General: Well-nourished, nontoxic  Eyes: PERRL, EOMI, no nystagmus.  No scleral icterus or conjunctival injection.    ENT:  Moist mucus membranes, posterior oropharynx clear without erythema or exudates. TM normal bilaterally  Neck: Supple with full range of motion  Respiratory:  Lungs clear to auscultation bilaterally, no crackles/rubs/wheezes.  Good air movement  CV: Normal rate and rhythm, no murmurs/rubs/gallops  GI:  Abdomen soft and non-distended.  No tenderness, guarding or rebound  Skin: Warm, dry.  No rashes or petechiae  MSK: No peripheral edema or calf tenderness  Neuro: Alert and oriented to person/place/time.  No aphasia/facial droop/dysarthria.  Tongue midline, normal strength at SCM/trapezius/BUE/BLE.  Normal finger to nose. Normal gait.  Negative romberg, sensation intact over face/BUE/BLE  Psychiatric: Blunt affect, denies suicidal/homicidal ideations    Emergency Department Course   ECG:  @ 1132  Vent. Rate 78 bpm. MD interval 170 ms. QRS duration 88 ms. QT/QTc 378/430 ms. P-R-T axis 28 -48 4.   Normal sinus rhythm. Left axis deviation. Abnormal ECG.    Read @ 1132 by Dr. Ag.     Laboratory:  1142 Glucose by meter: 360 (H)  1224 Glucose by meter: 346 (H)  1353 Glucose by meter: 285 (H)    CBC: WBC 7.0, HGB 11.0 (L),       CMP: Glucose 346 (H), o/w WNL (Creatinine: 0.86)     Blood gas venous and oxyhgb: pH 7.30 (L) PCO2 46 PO2 54 (H) Bicarbonate 23 Oxyhemoglobin 84 Base Deficit 3.6 FlO2 room air.        1224 Troponin I: <0.015     Magnesium: 1.8    Ketone Beta-Hydroxybutyrate  quantitative: 0.1    UA: Clear light yellow urine, Glucose: >1000, Blood: trace, Nitrite: positive, Leukocyte: moderate, WBC: 57 (H), Bacteria: few, Squamous epithelia: few, otherwise WNL     Urine culture: In process    Interventions:  1301 0.9% Sodium Chloride BOLUS 1000 mLs IV    1337 Insulin aspart 8 units subcutaneous given     Emergency Department Course:  1106 Nursing notes and vitals reviewed. I performed an exam of the patient as documented above.     EKG was done, interpretation as above.    IV inserted. Medicine administered as documented above. Blood drawn. This was sent to the lab for further testing, results above.    The patient provided a urine sample here in the emergency department. This was sent for laboratory testing, findings above.      1256 I rechecked the patient and discussed the results of her workup thus far.     Findings and plan explained to the Patient. Patient discharged home with instructions regarding supportive care, medications, and reasons to return. The importance of close follow-up was reviewed. The patient was prescribed Keflex and Zofran.    I personally reviewed the laboratory results with the Patient and answered all related questions prior to discharge.      Impression & Plan      Medical Decision Making:  Patient is a 60-year-old female presenting with concerns for hyperglycemia as well as reported general weakness.  She is nontoxic, without obvious focal neuro deficits on arrival.  She is noted to be hyperglycemic today though no evidence of DKA.  She was treated with IV fluids as well as subcutaneous insulin and her blood glucose did improve.  I suspect she is having elevated blood glucose secondary to what appears to be UTI today.  There is no evidence to suggest pyelonephritis. She is not septic appearing. She has a benign abdominal exam and I doubt intra-abdominal catastrophe; no indication for emergent imaging.  Triage note reported dizziness though patient denies at  bedside.  Screening EKG reassuring as is screening troponin.  She denies any active chest pain.  I did recommend p.o. hydration as well as glucose monitoring given infection.  She will be discharged home with antibiotics as well as ODT Zofran.  She is instructed to return to the ED for worsening fever, abdominal pain or should symptoms worsen or change.    Diagnosis:    ICD-10-CM    1. Urinary tract infection without hematuria, site unspecified  N39.0 Comprehensive metabolic panel     Troponin I     Ketone Beta-Hydroxybutyrate Quantitative     Magnesium     Urine Culture Aerobic Bacterial     Urine Culture Aerobic Bacterial     Glucose by meter     Glucose by meter     CANCELED: Urine Culture Aerobic Bacterial   2. Hyperglycemia  R73.9        Disposition:  discharged to home    Discharge Medications:  New Prescriptions    CEPHALEXIN (KEFLEX) 500 MG CAPSULE    Take 1 capsule (500 mg) by mouth 2 times daily for 7 days    ONDANSETRON (ZOFRAN ODT) 4 MG ODT TAB    Take 1 tablet (4 mg) by mouth every 8 hours as needed for nausea       Rosaura Chawla  10/4/2020   [facility] EMERGENCY DEPT    Scribe Disclosure:  I, Rosaura Chawla, am serving as a scribe at 11:06 AM on 10/4/2020 to document services personally performed by Valeria Ag DO based on my observations and the provider's statements to me.         Valeria Ag DO  10/04/20 [id number]

## 2020-10-05 LAB — INTERPRETATION ECG - MUSE: NORMAL

## 2020-10-06 ENCOUNTER — NURSE TRIAGE (OUTPATIENT)
Dept: FAMILY MEDICINE | Facility: CLINIC | Age: 60
End: 2020-10-06

## 2020-10-06 DIAGNOSIS — Z53.9 ERRONEOUS ENCOUNTER--DISREGARD: Primary | ICD-10-CM

## 2020-10-06 LAB
BACTERIA SPEC CULT: ABNORMAL
BACTERIA SPEC CULT: ABNORMAL
Lab: ABNORMAL
SPECIMEN SOURCE: ABNORMAL

## 2020-10-06 PROCEDURE — 99207 PR NO BILLABLE SERVICE THIS VISIT: CPT | Performed by: NURSE PRACTITIONER

## 2020-10-06 NOTE — RESULT ENCOUNTER NOTE
Final Urine Culture Report on 10/6/20  Emergency Dept/Urgent Care discharge antibiotic prescribed: Cephalexin (Keflex) 500 mg capsule, 1 capsule (500 mg) by mouth 2 times daily for 7 days.  #1. Bacteria, >100,000 colonies/mL Escherichia coli, is SUSCEPTIBLE to Antibiotic.    As per [place] ED Lab Result protocol, no change in antibiotic therapy.

## 2020-10-06 NOTE — TELEPHONE ENCOUNTER
"Patient called requesting assist with regard to her recent emergency room visit and concerns regarding UTI and diabetes    Additional Information    Negative: Unconscious or difficult to awaken    Negative: Acting confused (e.g., disoriented, slurred speech)    Negative: Very weak (can't stand)    Negative: Sounds like a life-threatening emergency to the triager    Negative: Vomiting and signs of dehydration (e.g., very dry mouth, lightheaded, dark urine)    Negative: Blood glucose > 240 mg/dL (13.3 mmol/L) and rapid breathing    Negative: Blood glucose > 500 mg/dL (27.8 mmol/L)    Negative: Blood glucose > 240 mg/dL (13.3 mmol/L) AND urine ketones moderate-large (or more than 1+)    Negative: Blood glucose > 240 mg/dL (13.3 mmol/L) and blood ketones > 1.4 mmol/L    Negative: Blood glucose > 240 mg/dL (13.3 mmol/L) AND vomiting AND unable to check for ketones (in blood or urine)    Negative: Vomiting lasting > 4 hours    Negative: Patient sounds very sick or weak to the triager    Negative: Fever > 100.5 F (38.1 C)    Negative: Caller has URGENT medication or insulin pump question and triager unable to answer question    Negative: Blood glucose > 400 mg/dL (22.2 mmol/L)    Negative: Blood glucose > 300 mg/dL (16.7 mmol/L) AND two or more times in a row    Negative: Urine ketones moderate - large (or blood ketones > 1.4 mmol/L)    Negative: New-onset diabetes suspected (e.g., frequent urination, weak, weight loss)    Negative: Symptoms of high blood sugar (e.g., frequent urination, weak, weight loss) and not able to test blood glucose    Negative: Patient wants to be seen    Negative: Caller has NON-URGENT medication question about med that PCP prescribed and triager unable to answer question    Blood glucose  mg/dL (3.9 -13.3 mmol/L)    Answer Assessment - Initial Assessment Questions  1. BLOOD GLUCOSE: \"What is your blood glucose level?\"       196  2. ONSET: \"When did you check the blood glucose?\"        " "10/6/2020 10:06 AM    3. USUAL RANGE: \"What is your glucose level usually?\" (e.g., usual fasting morning value, usual evening value)        Was 500 yesterday and seen in ED - dx with UTI and given antibiotics - encouraged to follow up with PCP    4. KETONES: \"Do you check for ketones (urine or blood test strips)?\" If yes, ask: \"What does the test show now?\"       No    5. TYPE 1 or 2:  \"Do you know what type of diabetes you have?\"  (e.g., Type 1, Type 2, Gestational; doesn't know)       Type II    6. INSULIN: \"Do you take insulin?\" \"What type of insulin(s) do you use? What is the mode of delivery? (syringe, pen (e.g., injection or  pump)?\"       Yes - long acting, short acting, type II    7. DIABETES PILLS: \"Do you take any pills for your diabetes?\" If yes, ask: \"Have you missed taking any pills recently?\"      No  8. OTHER SYMPTOMS: \"Do you have any symptoms?\" (e.g., fever, frequent urination, difficulty breathing, dizziness, weakness, vomiting)    Some weakness but not severe - denies LOC, falls, inability to stand or walk to bathroom        Denies fever, frequent urination, difficulty breathing, dizziness, vomiting    9. PREGNANCY: \"Is there any chance you are pregnant?\" \"When was your last menstrual period?\"      No    Protocols used: DIABETES - HIGH BLOOD SUGAR-A-OH      Reviewed provider plan with regard to diabetes - discussed antibiotic treatment - fluids - utilize zofran to help with nausea - take medication with food - monitor for fever/flank pain - return call to clinic if symptoms don't improve - scheduled patient for appointment tomorrow with MTM    TRULICITY 1.5 MG/0.5ML pen - patient takes on Friday - she reports she forgot to take that this last Friday - she reports this is one of the first times she forgot to take it - she usually does    insulin glargine (LANTUS PEN) 100 UNIT/ML pen - has not taken this morning yet - encouraged her to administer it    insulin aspart (NOVOLOG FLEXPEN) 100 UNIT/ML " pen - hasn't administered insulin this AM - encourage    Breakfast - says she is going to have some cereal    cephALEXin (KEFLEX) 500 MG capsule - she started this Monday after getting out of the emergency room - denies fever - has had nausea - given zofran - denies vomiting, diarrhea - continues with frequency of urination

## 2020-10-07 ENCOUNTER — OFFICE VISIT (OUTPATIENT)
Dept: PHARMACY | Facility: CLINIC | Age: 60
End: 2020-10-07
Payer: COMMERCIAL

## 2020-10-07 VITALS
WEIGHT: 219 LBS | OXYGEN SATURATION: 96 % | DIASTOLIC BLOOD PRESSURE: 68 MMHG | BODY MASS INDEX: 42.77 KG/M2 | HEART RATE: 87 BPM | SYSTOLIC BLOOD PRESSURE: 120 MMHG | TEMPERATURE: 98.4 F

## 2020-10-07 DIAGNOSIS — F25.1 SCHIZOAFFECTIVE DISORDER, DEPRESSIVE TYPE (H): ICD-10-CM

## 2020-10-07 DIAGNOSIS — F25.0 SCHIZOAFFECTIVE DISORDER, BIPOLAR TYPE (H): ICD-10-CM

## 2020-10-07 DIAGNOSIS — M54.50 CHRONIC RIGHT-SIDED LOW BACK PAIN WITHOUT SCIATICA: ICD-10-CM

## 2020-10-07 DIAGNOSIS — Z79.4 TYPE 2 DIABETES MELLITUS WITH MILD NONPROLIFERATIVE RETINOPATHY WITHOUT MACULAR EDEMA, WITH LONG-TERM CURRENT USE OF INSULIN, UNSPECIFIED LATERALITY (H): ICD-10-CM

## 2020-10-07 DIAGNOSIS — E11.3299 TYPE 2 DIABETES MELLITUS WITH MILD NONPROLIFERATIVE RETINOPATHY WITHOUT MACULAR EDEMA, WITH LONG-TERM CURRENT USE OF INSULIN, UNSPECIFIED LATERALITY (H): ICD-10-CM

## 2020-10-07 DIAGNOSIS — I25.119 CORONARY ARTERY DISEASE INVOLVING NATIVE HEART WITH ANGINA PECTORIS, UNSPECIFIED VESSEL OR LESION TYPE (H): ICD-10-CM

## 2020-10-07 DIAGNOSIS — G89.29 CHRONIC RIGHT-SIDED LOW BACK PAIN WITHOUT SCIATICA: ICD-10-CM

## 2020-10-07 DIAGNOSIS — N39.0 URINARY TRACT INFECTION WITHOUT HEMATURIA, SITE UNSPECIFIED: Primary | ICD-10-CM

## 2020-10-07 PROCEDURE — 99606 MTMS BY PHARM EST 15 MIN: CPT | Performed by: PHARMACIST

## 2020-10-07 PROCEDURE — 99607 MTMS BY PHARM ADDL 15 MIN: CPT | Performed by: PHARMACIST

## 2020-10-07 RX ORDER — SERTRALINE HYDROCHLORIDE 100 MG/1
200 TABLET, FILM COATED ORAL DAILY
Qty: 60 TABLET | Refills: 3 | Status: SHIPPED | OUTPATIENT
Start: 2020-10-07 | End: 2020-12-01

## 2020-10-07 RX ORDER — GABAPENTIN 300 MG/1
300 CAPSULE ORAL AT BEDTIME
Qty: 30 CAPSULE | Refills: 3 | Status: SHIPPED | OUTPATIENT
Start: 2020-10-07 | End: 2021-02-12

## 2020-10-07 RX ORDER — CLONAZEPAM 0.5 MG/1
0.5 TABLET ORAL AT BEDTIME
Qty: 30 TABLET | Refills: 3 | Status: SHIPPED | OUTPATIENT
Start: 2020-10-07 | End: 2021-04-06

## 2020-10-07 RX ORDER — LOSARTAN POTASSIUM 100 MG/1
100 TABLET ORAL DAILY
Qty: 30 TABLET | Refills: 3 | Status: SHIPPED | OUTPATIENT
Start: 2020-10-07 | End: 2021-01-27

## 2020-10-07 NOTE — PATIENT INSTRUCTIONS
Recommendations from today's MTM visit:                                                      1. Take Lantus 60 units in the morning.  No need to take Lantus at night.    2. Test sugar twice a day before eating.  Take Novolog 24 units twice a day before eating too.    3. Take antibiotic with food.  If you feel nausea, try the ondanseton pill under your tongue.  Call the clinic if this is not going well.    4. I will talk to your nurse Lanny about the Dexcom blood sugar monitor to see if she can help.    It was great to speak with you today.  I value your experience and would be very thankful for your time with providing feedback on our clinic survey. You may receive a survey via email or text message in the next few days.     Next MTM visit: 2 weeks    To schedule another MTM appointment, please call the clinic directly or you may call the MTM scheduling line at [phone] or toll-free at [phone].     My Clinical Pharmacist's contact information:                                                      It was a pleasure talking with you today!  Please feel free to contact me with any questions or concerns you have.      Eugenia De Jesus, PharmD, BCACP   Medication Management Pharmacist   [facility] - [facility]  [phone]

## 2020-10-07 NOTE — PROGRESS NOTES
MTM ENCOUNTER  SUBJECTIVE/OBJECTIVE:                           Nena Tang is a 60 year old female coming in for a follow-up visit. She was referred to me from Rowena Haas.  Today's visit is a follow-up MTM visit from 9/15/20       Chief Complaint: follow-up ED visit for hyperglycemia.    Tobacco: She reports that she has never smoked. She has never used smokeless tobacco.  Alcohol: none    Medication Adherence/Access:   Issues reported and discussed below  Medications are set up in a pill box by HCRN  Received VM from RN today that pt is needing refills and having difficulty with the pharmacy communication-see separate refill encounter.    UTI: currently taking cephalexin 500mg BID.  Side effects: nausea. Sometimes not taking abx with food. Has not been using ondansetron.     Type 2 Diabetes: Pt currently taking Lantus 30u BID (forgetting PM dose often, unable to quantify), Novolog 24u BID (forgetting PM dose), Trulicity 1.5mg weekly (missed inj last Friday). Pt is not experiencing side effects.    SMB-2 times daily.   Ranges (glucometer):   Date FBG/ 2hours post Lunch/2hours post Dinner /2hours post    10/7 203      10/6 196  /436    10 159  /371    10/4 507      10/1 269       145       215         Recent symptoms of high blood sugar? improvement in the last few days in polyuria and polydipsia.   Eye exam: up to date  Foot exam: up to date  ACEi/ARB: No.   Urine Albumin:         Lab Results   Component Value Date     UMALCR 4.47 2019      Aspirin: Taking 81mg daily and denies side effects  Diet/Exercise: eating twice a day. Reports no snacking during the day.   Lab Results   Component Value Date    A1C 9.7 09/15/2020    A1C 8.6 2020    A1C 6.2 2019    A1C 6.6 2019    A1C 7.6 05/15/2019       Today's Vitals: /68   Pulse 87   Temp 98.4  F (36.9  C) (Oral)   Wt 99.3 kg (219 lb)   LMP 2015   SpO2 96%   BMI 42.77 kg/m        ASSESSMENT:                               Medication Adherence: See below for considerations    UTI: encouraged pt to take all doses with food BID for the full course and try PRN ondansetron. Discussed when to call clinic if unable to tolerate side effects.     Type 2 Diabetes: Symptoms of hyperglycemia have improved. Pt would benefit from changing Lantus to once daily dosing to help with adherence. Continue Novolog BID as prescribed.  She is still interested in Dexcom and prefers to have HCRN help with application vs her sister - will attempt to reach RN to discuss logistics.      PLAN:                            1. Change Lantus to 60u once daily  2. Attempted to reach [place] [facility] regarding help with Dexcom, called x2 and no answer, unable to LVM.       I spent 30 minutes with this patient today. All changes were made via collaborative practice agreement with Rowena Haas. A copy of the visit note was provided to the patient's primary care provider.    Will follow up in 2 weeks.    The patient was given a summary of these recommendations.     Eugenia De Jesus, KiD, BCACP

## 2020-10-07 NOTE — TELEPHONE ENCOUNTER
Received call from [facility] requesting refills on the following medications:    Losartan  Sertraline  Gabapentin  Clonazepam    Pt has enough medication for the next 14 days.     Pended Rx and routed refill request to pool.    Eugenia De Jesus, KiD, BCACP

## 2020-10-12 NOTE — TELEPHONE ENCOUNTER
Completed forms faxed to, [facility], [phone].   Forms sent to abstract after being faxed.   Dominic Castillo MA on 10/01/2020 at 11:15 AM

## 2020-10-15 ENCOUNTER — MEDICAL CORRESPONDENCE (OUTPATIENT)
Dept: HEALTH INFORMATION MANAGEMENT | Facility: CLINIC | Age: 60
End: 2020-10-15

## 2020-10-20 ENCOUNTER — OFFICE VISIT (OUTPATIENT)
Dept: FAMILY MEDICINE | Facility: CLINIC | Age: 60
End: 2020-10-20
Payer: COMMERCIAL

## 2020-10-20 ENCOUNTER — OFFICE VISIT (OUTPATIENT)
Dept: BEHAVIORAL HEALTH | Facility: CLINIC | Age: 60
End: 2020-10-20
Payer: COMMERCIAL

## 2020-10-20 VITALS
TEMPERATURE: 96.9 F | SYSTOLIC BLOOD PRESSURE: 122 MMHG | HEART RATE: 88 BPM | OXYGEN SATURATION: 96 % | BODY MASS INDEX: 43.55 KG/M2 | DIASTOLIC BLOOD PRESSURE: 70 MMHG | WEIGHT: 223 LBS

## 2020-10-20 DIAGNOSIS — F43.10 POSTTRAUMATIC STRESS DISORDER: Primary | ICD-10-CM

## 2020-10-20 DIAGNOSIS — I10 HTN, GOAL BELOW 140/90: ICD-10-CM

## 2020-10-20 DIAGNOSIS — F43.10 POSTTRAUMATIC STRESS DISORDER: ICD-10-CM

## 2020-10-20 DIAGNOSIS — F25.1 SCHIZOAFFECTIVE DISORDER, DEPRESSIVE TYPE (H): ICD-10-CM

## 2020-10-20 DIAGNOSIS — K08.89 PAIN, DENTAL: ICD-10-CM

## 2020-10-20 DIAGNOSIS — Z79.4 TYPE 2 DIABETES MELLITUS WITH HYPERGLYCEMIA, WITH LONG-TERM CURRENT USE OF INSULIN (H): Primary | ICD-10-CM

## 2020-10-20 DIAGNOSIS — E11.65 TYPE 2 DIABETES MELLITUS WITH HYPERGLYCEMIA, WITH LONG-TERM CURRENT USE OF INSULIN (H): Primary | ICD-10-CM

## 2020-10-20 PROCEDURE — 99215 OFFICE O/P EST HI 40 MIN: CPT | Performed by: NURSE PRACTITIONER

## 2020-10-20 PROCEDURE — 90832 PSYTX W PT 30 MINUTES: CPT | Performed by: SOCIAL WORKER

## 2020-10-20 NOTE — PATIENT INSTRUCTIONS
- CPAP nightly to help with better sleep.   - Dental and Eye referral in place.   - Call clinic with any questions or concerns.

## 2020-10-20 NOTE — PROGRESS NOTES
SUBJECTIVE:                                                    Nena Tang is a 60 year old female who presents to clinic today for the following health issues:  MTM: Eugenia  [facility]: Riaz      HPI:   ED Follow-up:  Patient notes that her UTI was gone. Had some issues with constipation but now improved with the stool softener. No further concerns.     General weakness: Patient notes that she has completed both PT and OT but still feels that her weakness is still there although has improved. Encouraged patient to continue with the recommended exercises and we can continue to monitor.      Diabetes: Patient reports that her blood sugars are coming down slowly.Mostly ranging [id number] nothing over 400: forgot to bring her blood sugar meter. Nothing over 300.   Patient notes that she has been taking Lantus 60 units in the morning and novolog 24 units twice per day with food.     Was seen by the podiatry at [facility] and they had to cut out part of her big toe because of an ingrown right big toe that was growing into her foot. Otherwise she notes that her feet are doing well.     Hypertension: Denies any chest pain, shortness of breath, no heart palpitations or any syncopal episodes.     Mental Health Follow up: Depression.   Patient reports that her sister had a b-day party this past weekend and there was a lot of cake. Discussed that everything should be in moderation when it comes to her diabetes.   Husbands death anniversary coming up on the 25 th. No plans with her son yet about the ashes. Patient is still struggling with his death - happened 9 years ago. Denies any suicidal thoughts.   , [facility] worker and [facility] worker are looking for a place for her to go to- used to go to the drop in center. Currently staying at home and watching movies.     Status since last visit: feels like her mood has been more sad and feeling down than normal. Her sister has chandan helpful in getting her out of the  bed and moving forward.     See PHQ-9 for current symptoms.  Other associated symptoms: None    Complicating factors: none a the moment.   Significant life event:  No   Current substance abuse:  None  Anxiety or Panic symptoms:  No    Sleep - fair, working on using her CPAP nightly- currently not in use. Patient will plan to hopefully start in the next week or so.   Appetite - good.   Exercise - encouraged her to continue with the physical therapy exercise.     PHQ-9  English PHQ-9   Any Language               Problems taking medications regularly: No, denies missing any doses.     Medication side effects: none    Diet: regular (no restrictions)    Social History     Tobacco Use     Smoking status: Never Smoker     Smokeless tobacco: Never Used   Substance Use Topics     Alcohol use: No     Frequency: Never     Comment: last month        Problem list and histories reviewed & adjusted, as indicated.  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 (H)     Illiterate     Irritable bowel syndrome     overweight - BMI >35     Takotsubo cardiomyopathy     CAD (coronary artery disease)     Restless legs syndrome (RLS)     CINDY (obstructive sleep apnea)- mild AHI 10.3     Verbal auditory hallucination     Chronic low back pain     Schizoaffective disorder, depressive type (H)     Migraine headache     HTN, goal below 140/90     Health Care Home     Lumbago     Cervicalgia     Cocaine abuse, episodic (H)     Suicidal ideation     Esophageal reflux     Mild nonproliferative diabetic retinopathy (H)     Tardive dyskinesia     Alcohol use     Left cataract     Falls frequently     History of uterine cancer     Psychophysiological insomnia     Dysuria     Asymptomatic postmenopausal status     Abdominal pain, right  lower quadrant     Infectious encephalopathy     Non-intractable vomiting with nausea     Thoughts of self harm     Gastroenteritis     Posttraumatic stress disorder     Cervical cancer screening     Type 2 diabetes mellitus with stage 3 chronic kidney disease, with long-term current use of insulin (H)     Positive AIDA (antinuclear antibody)     Hyperglycemia     Pneumonia     Depression with suicidal ideation     Mixed incontinence     Past Surgical History:   Procedure Laterality Date     C OOPHORECTORMY FOR RAHEL, W/BX  1983    UTERINE     CATARACT IOL, RT/LT Bilateral 2017     COLONOSCOPY N/A 3/16/2017    Procedure: COLONOSCOPY;  Surgeon: Traci Gonzalez MD;  Location:  GI     Coronary CTA  5/21/2014     HYSTERECTOMY  1983    uterine cancer yearly pap's per provider.     LAPAROSCOPIC CHOLECYSTECTOMY  2008     PHACOEMULSIFICATION CLEAR CORNEA WITH STANDARD INTRAOCULAR LENS IMPLANT Left 5/5/2017    Procedure: PHACOEMULSIFICATION CLEAR CORNEA WITH STANDARD INTRAOCULAR LENS IMPLANT;  LEFT EYE PHACOEMULSIFICATION CLEAR CORNEA WITH STANDARD INTRAOCULAR LENS IMPLANT ;  Surgeon: Tyra Diaz MD;  Location:  EC     PHACOEMULSIFICATION CLEAR CORNEA WITH STANDARD INTRAOCULAR LENS IMPLANT Right 6/30/2017    Procedure: PHACOEMULSIFICATION CLEAR CORNEA WITH STANDARD INTRAOCULAR LENS IMPLANT;  RIGHT EYE PHACOEMULSIFICATION CLEAR CORNEA WITH STANDARD INTRAOCULAR LENS IMPLANT;  Surgeon: Tyra Diaz MD;  Location:  EC     RELEASE TRIGGER FINGER  10/11/2012    Left thumb. Procedure: RELEASE TRIGGER FINGER;  LEFT THUMB TRIGGER RELEASE;  Surgeon: Tay Langley MD;  Location:  SD     RELEASE TRIGGER FINGER Right 12/26/2016    Procedure: RELEASE TRIGGER FINGER;  Surgeon: Albino Castañeda MD;  Location:  OR       Social History     Tobacco Use     Smoking status: Never Smoker     Smokeless tobacco: Never Used   Substance Use Topics     Alcohol use: No     Frequency: Never     Comment: last  month     Family History   Problem Relation Age of Onset     Cancer Mother         BLADDER     Respiratory Mother         COPD     Gastrointestinal Disease Mother         CIRRHOSIS OF LI BOLIVAR     Alcohol/Drug Mother      Diabetes Mother      Hypertension Mother      Lipids Mother      C.A.D. Mother      Glaucoma Mother      Alcohol/Drug Sister      Mental Illness Sister      Alcohol/Drug Sister      Psychotic Disorder Sister      Cancer Maternal Grandmother         UNKNOWN TYPE     Cancer Brother         COLON     Cancer - colorectal Brother         IN HIS LATE 30S     Alcohol/Drug Brother          OF HEROIN OVERDOSE AT AGE 22 YRS     Macular Degeneration No family hx of            Current Outpatient Medications   Medication Sig Dispense Refill     acetaminophen (TYLENOL) 650 MG CR tablet Take 1 tablet (650 mg) by mouth every 8 hours as needed for mild pain or fever 120 tablet 1     alcohol swab prep pads Use to swab area of injection/rodolfo as directed. 100 each 3     amantadine (SYMMETREL) 100 MG capsule Take 1 capsule (100 mg) by mouth 2 times daily 60 capsule 3     aspirin (ASA) 81 MG EC tablet TAKE 1 TABLET (81MG) BY MOUTH DAILY 90 tablet 3     atorvastatin (LIPITOR) 80 MG tablet TAKE 1 TABLET (80MG) BY MOUTH DAILY 30 tablet 11     blood glucose (NO BRAND SPECIFIED) test strip Use to test blood sugar 4 times daily or as directed. 100 strip 11     clonazePAM (KLONOPIN) 0.5 MG tablet Take 1 tablet (0.5 mg) by mouth At Bedtime 30 tablet 3     diclofenac (VOLTAREN) 1 % topical gel Place 4 g onto the skin 4 times daily as needed for moderate pain       famotidine (PEPCID) 20 MG tablet Take 1 tablet (20 mg) by mouth daily 30 tablet 3     fluticasone-salmeterol (ADVAIR) 250-50 MCG/DOSE inhaler Inhale 1 puff into the lungs every 12 hours as needed       gabapentin (NEURONTIN) 300 MG capsule Take 1 capsule (300 mg) by mouth At Bedtime 30 capsule 3     hydrOXYzine (VISTARIL) 25 MG capsule Take 1 capsule (25 mg) by  mouth every 4 hours as needed for anxiety 60 capsule 3     insulin aspart (NOVOLOG FLEXPEN) 100 UNIT/ML pen Inject 24 units under the skin 3 times daily before meals as directed. Take an extra 10 units with sugary foods. 15 mL 1     insulin glargine (LANTUS PEN) 100 UNIT/ML pen Inject 60 Units Subcutaneous every morning       insulin pen needle (NOVOFINE 30) 30G X 8 MM miscellaneous USE 4 DAILY OR AS DIRECTED 400 each 0     ipratropium - albuterol 0.5 mg/2.5 mg/3 mL (DUONEB) 0.5-2.5 (3) MG/3ML neb solution Take 1 vial (3 mLs) by nebulization every 6 hours as needed for shortness of breath / dyspnea or wheezing 30 vial 0     losartan (COZAAR) 100 MG tablet Take 1 tablet (100 mg) by mouth daily 30 tablet 3     metoprolol succinate ER (TOPROL-XL) 25 MG 24 hr tablet Take 2 tablets (50 mg) by mouth daily 180 tablet 3     miconazole (MICATIN/MICRO GUARD) 2 % external powder Apply topically once as needed       nystatin (MYCOSTATIN) 894675 UNIT/GM external cream Apply topically 2 times daily 30 g 3     order for DME Equipment being ordered: Depends. 30 each 4     order for DME Equipment being ordered: CPAP Supplies. 1 each 0     paliperidone ER (INVEGA) 9 MG 24 hr tablet Take 1 tablet (9 mg) by mouth At Bedtime 60 tablet 1     senna-docusate (SENOKOT-S/PERICOLACE) 8.6-50 MG tablet Take 1 tablet by mouth 2 times daily as needed for constipation 60 tablet 10     sertraline (ZOLOFT) 100 MG tablet Take 2 tablets (200 mg) by mouth daily 60 tablet 3     thin (NO BRAND SPECIFIED) lancets Use with lanceting device. To accompany: Blood Glucose Monitor Brands: per insurance. 100 each 6     topiramate (TOPAMAX) 50 MG tablet Take 1 tablet (50 mg) by mouth At Bedtime 30 tablet 3     traZODone (DESYREL) 100 MG tablet Take 1 tablet (100 mg) by mouth nightly as needed for sleep 30 tablet 3     TRULICITY 1.5 MG/0.5ML pen Inject 1.5 mg Subcutaneous once a week Every Friday 4 mL 3     zinc oxide (DESITIN) 40 % external ointment Apply  topically as needed for dry skin or irritation 56 g 0     Allergies   Allergen Reactions     Imidazole Antifungals Hives     Tolerates diflucan     Ketoprofen Itching     Pruritis to topical     Lisinopril Hives     Metformin Other (See Comments)     Patient hospitalized for lactic acidosis - admitting provider suspectd caused by metformin     Metronidazole Hives     Posaconazole Hives     Tolerates diflucan     Recent Labs   Lab Test 10/04/20  1224 09/15/20  1454 06/30/20  1624 12/22/19  0651 12/22/19  0651 12/03/19  1143 08/06/19  1043 08/06/19  1043 05/24/19  0916 05/24/19  0916 08/06/18  1038 08/06/18  1038   A1C  --  9.7* 8.6*  --   --  6.2*  --  6.6*  --   --    < > 6.4*   LDL  --   --   --   --  86  --   --  81  --   --   --  84   HDL  --   --   --   --  42*  --   --  39*  --   --   --  46*   TRIG  --   --   --   --  132  --   --  131  --   --   --  215*   ALT 29 29 35   < > 22 26   < >  --    < > 30   < >  --    CR 0.86 0.85 0.98   < > 0.86 0.91   < >  --    < > 0.87   < >  --    GFRESTIMATED 73 74 63   < > 74 69   < >  --    < > 73   < >  --    GFRESTBLACK 85 86 73   < > 85 80   < >  --    < > 85   < >  --    POTASSIUM 4.2 4.4 4.3   < > 4.1 4.1   < >  --    < > 4.0   < >  --    TSH  --   --   --   --  1.23  --   --   --   --  1.62   < >  --     < > = values in this interval not displayed.      BP Readings from Last 3 Encounters:   10/07/20 120/68   10/04/20 (!) 143/72   09/15/20 130/78    Wt Readings from Last 3 Encounters:   10/07/20 99.3 kg (219 lb)   09/15/20 98.7 kg (217 lb 9 oz)   07/21/20 100.8 kg (222 lb 5 oz)          Labs reviewed in EPIC  Problem list, Medication list, Allergies, and Medical/Social/Surgical histories reviewed in [facility] and updated as appropriate.      ROS: Constitutional, neuro, ENT, endocrine, pulmonary, cardiac, gastrointestinal, genitourinary, musculoskeletal, integument and psychiatric systems are negative, except as otherwise noted above in the HPI.    OBJECTIVE:                                                     /70   Pulse 88   Temp 96.9  F (36.1  C) (Temporal)   Wt 101.2 kg (223 lb)   LMP 01/06/2015   SpO2 96%   BMI 43.55 kg/m    Body mass index is 43.55 kg/m .    GENERAL: healthy, alert and no distress  EYES: Eyes grossly normal to inspection, PERRL and conjunctivae and sclerae normal  NECK: no adenopathy, no asymmetry, masses, or scars and thyroid normal to palpation  RESP: lungs clear to auscultation - no rales, rhonchi or wheezes  CV: regular rates and rhythm, normal S1 S2, no S3 or S4 and no murmur, click or rub  ABDOMEN: soft, nontender and bowel sounds normal  MS: no gross musculoskeletal defects noted, no edema  NEURO: Normal strength and tone, mentation intact and speech normal    Mental Status Assessment:  Appearance:   Appropriate   Eye Contact:   Good   Psychomotor Behavior: Normal   Attitude:   Cooperative   Orientation:   All  Speech   Rate / Production: Normal    Volume:  Normal   Mood:    Normal Sad   Affect:    Appropriate   Thought Content:  Clear   Thought Form:  Coherent  Logical   Insight:    Fair   Attention Span and Concentration:appropriate  Recent and Remote Memory:  intact  Fund of Knowledge: appropriate  Muscle Strength and Tone: normal   Suicidal Ideation: reports no thoughts, no intention  Hallucination: No    See [facility] notes     Diagnostic Test Results:  none      ASSESSMENT/PLAN:                                                    (E11.65,  Z79.4) Type 2 diabetes mellitus with hyperglycemia, with long-term current use of insulin (H)  (primary encounter diagnosis)  Comment: Uncontrolled. Patient is working with Eugenia ([facility]) to try and manage her diabetes better. Will continue to monitor. Patient was recently seen by podiatry. Is overdue for an eye exam, will get the referral in place.   Lab Results   Component Value Date    A1C 9.7 09/15/2020    A1C 8.6 06/30/2020    A1C 6.2 12/03/2019    A1C 6.6 08/06/2019    A1C 7.6 05/15/2019     Plan: EYE ADULT  REFERRAL            (K08.89) Pain, dental  Comment: Patient reports some tooth pain, nothing that is not manageable at this time. Will get a referral in place for a routine check.   Plan: DENTAL REFERRAL            (I10) HTN, goal below 140/90  Comment/Plan: Improving and stable. Denies any symptoms, continue to monitor.   BP Readings from Last 3 Encounters:   10/20/20 122/70   10/07/20 120/68   10/04/20 (!) 143/72       (F25.1) Schizoaffective disorder, depressive type (H)  (F43.10) Posttraumatic stress disorder  Comment: Reports increase in sadness and depression with the anniversary of the death of her  coming up. Encouraged her to continue checking in with Don ([facility]).   Plan: Continue with current psychiatry medications with no changes.       Patient Instructions   - CPAP nightly to help with better sleep.   - Dental and Eye referral in place.   - Call clinic with any questions or concerns.     I spent Greater than 40 minutes was spent face to face with Nena Tang, with greater than 50 % in counselling and consultation about Diabetes, hypertension, Mental health.     ART F[facility] [place]

## 2020-10-21 NOTE — PROGRESS NOTES
[facility] Primary [facility]   October 20, 2020    Behavioral Health Clinician Progress Note    Patient Name: Nena Tang           Service Type:  Individual      Service Location:   Face to Face in Clinic     Session Start Time: 5:07 pm  Session End Time:  5:28 pm      Session Length: 16 - 37      Attendees: Patient and PCP    Visit Activities (Refresh list every visit): [facility], [place] [facility]     Diagnostic Assessment Date: 1-23-18 Updated DA completed December 12, 2019  Treatment Plan Review Date: 11-  CGI date completed: 8-    See Flowsheets for today's PHQ-9 and TAMIA-7 results  Previous PHQ-9:   PHQ-9 SCORE 3/13/2018 10/26/2018 5/7/2019   PHQ-9 Total Score - - -   PHQ-9 Total Score - - -   PHQ-9 Total Score 14 20 22     Previous TAMIA-7:   TAMIA-7 SCORE 2/3/2017 3/13/2018 10/26/2018   Total Score - - -   Total Score 0 17 19   Total Score - - -       ALEXANDRA LEVEL:  ALEXANDRA Score (Last Two) 8/30/2011 6/9/2014   ALEXANDRA Raw Score 42 37   Activation Score 66 49.9   ALEXANDRA Level 3 2       DATA  Extended Session (60+ minutes): No  Interactive Complexity: No  Crisis: No  [facility] Patient: No    Treatment Objective(s) Addressed in This Session:  Target Behavior(s): disease management/lifestyle changes mental health    Depressed Mood: Increase interest, engagement, and pleasure in doing things  Decrease frequency and intensity of feeling down, depressed, hopeless  Improve quantity and quality of night time sleep / decrease daytime naps  Identify negative self-talk and behaviors: challenge core beliefs, myths, and actions  Improve concentration, focus, and mindfulness in daily activities   Feel less fidgety, restless or slow in daily activities / interpersonal interactions  Anxiety: will experience a reduction in anxiety and will develop more effective coping skills to manage anxiety symptoms  Thought Disturbance: will develop skills to more effectively manage symptoms and will continue to take medications as  "prescribed  Psychological distress related to Diabetes    Current Stressors / Issues:    [facility] co-visit with patient and PCP in the clinic. Patient reports mood is fairly stable, however, she has been feeling sad lately as the anniversary of her 's death is approaching. He passed away nine years ago, on October 25th. Patient states she and her son are working on a plan to spread his ashes. She states she is taking care of herself and reaches out to family for support. Validated patient's insight; she Patient reports sleep is \"okay,\" however, she hasn't worn her CPAP since leaving the [facility]. Discussed the potential benefits of wearing it and the importance of maintaining good, consistent sleep. Patient expressed understanding and agreed to a plan to prepare and begin using the CPAP next Monday. She will be mindful of whether sleep improves.  Patient endorsed passive thoughts of being better off dead when missing her parents and hiusband. Denied urges to harm herself or others.     Progress on Treatment Objective(s) / Homework:  Minimal progress - PREPARATION (Decided to change - considering how); Intervened by negotiating a change plan and determining options / strategies for behavior change, identifying triggers, exploring social supports, and working towards setting a date to begin behavior change    Motivational Interviewing    MI Intervention: Expressed Empathy/Understanding, Supported Autonomy, Collaboration, Evocation, Permission to raise concern or advise, Open-ended questions, Reflections: simple and complex, Rolled with resistance: Emphasized patient autonomy, Simple reflection and Evoked patient agenda and Change talk (evoked)     Change Talk Expressed by the Patient: Desire to change Ability to change Reasons to change Need to change Committment to change Activation Taking steps    Provider Response to Change Talk: E - Evoked more info from patient about behavior change, A - Affirmed patient's thoughts, " decisions, or attempts at behavior change, R - Reflected patient's change talk and S - Summarized patient's change talk statements      Care Plan review completed: No    Medication Review:  No changes to current psychiatric medication(s)     Medication Compliance:  Yes    Changes in Health Issues:   Yes: please see medical note by PCP     Chemical Use Review:   Substance Use: Chemical use reviewed, no active concerns identified      Tobacco Use: No current tobacco use.      Assessment: Current Emotional / Mental Status (status of significant symptoms):  Risk status (Self / Other harm or suicidal ideation)  Patient has had a history of suicidal ideation: yes  Patient denies current fears or concerns for personal safety.  Patient denies current or recent suicidal ideation or behaviors.  Patient denies current or recent homicidal ideation or behaviors.  Patient denies current or recent self injurious behavior or ideation.  Patient denies other safety concerns.  A safety and risk management plan has not been developed at this time, however patient was encouraged to call [place] [phone] should there be a change in any of these risk factors.    Appearance:   Appropriate   Eye Contact:   Good   Psychomotor Behavior: Retarded (Slowed)   Attitude:   Cooperative   Orientation:   All  Speech   Rate / Production: Normal    Volume:  Normal   Mood:    Anxious  Normal Sad   Affect:    Appropriate  Blunted  Worrisome   Thought Content:  Clear   Thought Form:  Coherent  Goal Directed  Logical   Insight:    Fair     Diagnoses:  1. Posttraumatic stress disorder        Collateral Reports Completed:  Not Applicable    Plan: (Homework, other):  Patient was given information about behavioral services and encouraged to schedule a follow up appointment with the clinic [facility] as needed to work on helping the patient with management of mood states and to work on stress management around her grieving process-also to help the patient with her  behavioral needs to comply with treatment medical recommendations.  She was also given information about mental health symptoms and treatment options .  CD Recommendations: Maintain Sobriety.    Riaz Montenegro, [facility], [facility]                                                    Treatment Plan    Client's Name: Nena Tang  YOB: 1960    Date: Reviewed/updated on 8-    DSM5 Diagnoses: (Sustained by DSM5 Criteria Listed Above)  Diagnoses:  295.70 (F25.0), Schizoaffective disorder, depressive type; 309.81 (F43.10) Posttraumatic Stress Disorder with panic specifier, history of chemical dependency issues (polysubstance dependence)  Psychosocial & Contextual Factors: Academics / Education - yes  Activities of Daily Living - yes  Financial management yes  Follow through with Medical recommendations - yes  Occupational / Vocational - yes  Social / Relational - yes, grief and loss  WHODAS Score: 30      Referral / Collaboration:  Referral to another professional/service is not indicated at this time..    Anticipated number of session or this episode of care: 20      MeasurableTreatment Goal(s) related to diagnosis / functional impairment(s)  Goal 1: Client will improve her ability to manage anxiety and mood states.     I will know I've met my goal when the man does not paralyze the me with fear.     Objective #A (Client Action)    Client will increase understanding of steps in the grief process.  Status: Continued - Date(s): 8- she stated that she still has her husbands ashes-she was reminded to make the decision that is best for her with her grieving-  Intervention(s)  Therapist will teach emotional recognition/identification. teach the navigation of grieving encouraging acceptance and adjustment.    Objective #B  Client will Decrease frequency and intensity of feeling down, depressed, hopeless  Decrease thoughts that you'd be better off dead or of suicide / self-harm.  Status: Continued - Date(s):   8- regarding mood the patient reported having stable mood-she spends a lot of time with her sister-she has a PCA worker-this worker is helpful-no suicidal thoughts-she is taking her medications as prescribed-she spends time with her sons-she has not been able to go to the drop in center and she misses this place-    Intervention(s)  Therapist will teach emotional regulation skills. with the use of mindful coping strategies and open communication of feelings and thoughts (getting it out to another person)    Client has reviewed and agreed to the above plan.      Riaz Montenegro, [facility]  8-        ______________________________________________________________________

## 2020-10-22 ENCOUNTER — ALLIED HEALTH/NURSE VISIT (OUTPATIENT)
Dept: PHARMACY | Facility: CLINIC | Age: 60
End: 2020-10-22
Payer: COMMERCIAL

## 2020-10-22 DIAGNOSIS — Z53.9 ERRONEOUS ENCOUNTER--DISREGARD: Primary | ICD-10-CM

## 2020-10-22 NOTE — PROGRESS NOTES
Attempted to reach patient x2 for scheduled visit. No answer and LVM    Eugenia De Jesus, PharmD, BCACP

## 2020-10-30 ENCOUNTER — MEDICAL CORRESPONDENCE (OUTPATIENT)
Dept: HEALTH INFORMATION MANAGEMENT | Facility: CLINIC | Age: 60
End: 2020-10-30

## 2020-10-30 DIAGNOSIS — Z53.9 DIAGNOSIS NOT YET DEFINED: Primary | ICD-10-CM

## 2020-10-30 DIAGNOSIS — F25.1 SCHIZOAFFECTIVE DISORDER, DEPRESSIVE TYPE (H): ICD-10-CM

## 2020-10-30 PROCEDURE — 99207 PR MD CERTIFICATION HHA PATIENT: CPT | Performed by: NURSE PRACTITIONER

## 2020-11-03 RX ORDER — AMANTADINE HYDROCHLORIDE 100 MG/1
100 CAPSULE, GELATIN COATED ORAL 2 TIMES DAILY
Qty: 60 CAPSULE | Refills: 3 | Status: SHIPPED | OUTPATIENT
Start: 2020-11-03 | End: 2021-01-05

## 2020-11-03 NOTE — TELEPHONE ENCOUNTER
Requested Prescriptions   Pending Prescriptions Disp Refills     amantadine (SYMMETREL) 100 MG capsule 60 capsule 3     Sig: Take 1 capsule (100 mg) by mouth 2 times daily       There is no refill protocol information for this order        Routing refill request to provider for review/approval because:  Drug not on the [facility] refill protocol

## 2020-11-16 ENCOUNTER — HEALTH MAINTENANCE LETTER (OUTPATIENT)
Age: 60
End: 2020-11-16

## 2020-11-17 ENCOUNTER — OFFICE VISIT (OUTPATIENT)
Dept: FAMILY MEDICINE | Facility: CLINIC | Age: 60
End: 2020-11-17
Payer: COMMERCIAL

## 2020-11-17 ENCOUNTER — OFFICE VISIT (OUTPATIENT)
Dept: BEHAVIORAL HEALTH | Facility: CLINIC | Age: 60
End: 2020-11-17
Payer: COMMERCIAL

## 2020-11-17 ENCOUNTER — VIRTUAL VISIT (OUTPATIENT)
Dept: PHARMACY | Facility: CLINIC | Age: 60
End: 2020-11-17
Payer: COMMERCIAL

## 2020-11-17 DIAGNOSIS — Z79.4 TYPE 2 DIABETES MELLITUS WITH MILD NONPROLIFERATIVE RETINOPATHY WITHOUT MACULAR EDEMA, WITH LONG-TERM CURRENT USE OF INSULIN, UNSPECIFIED LATERALITY (H): ICD-10-CM

## 2020-11-17 DIAGNOSIS — F25.1 SCHIZOAFFECTIVE DISORDER, DEPRESSIVE TYPE (H): ICD-10-CM

## 2020-11-17 DIAGNOSIS — E66.9 OBESITY, UNSPECIFIED OBESITY SEVERITY, UNSPECIFIED OBESITY TYPE: ICD-10-CM

## 2020-11-17 DIAGNOSIS — G47.33 OSA (OBSTRUCTIVE SLEEP APNEA): ICD-10-CM

## 2020-11-17 DIAGNOSIS — M54.50 CHRONIC RIGHT-SIDED LOW BACK PAIN WITHOUT SCIATICA: Primary | ICD-10-CM

## 2020-11-17 DIAGNOSIS — M79.10 MUSCLE ACHE: Primary | ICD-10-CM

## 2020-11-17 DIAGNOSIS — G89.29 CHRONIC RIGHT-SIDED LOW BACK PAIN WITHOUT SCIATICA: Primary | ICD-10-CM

## 2020-11-17 DIAGNOSIS — F25.0 SCHIZOAFFECTIVE DISORDER, BIPOLAR TYPE (H): Primary | ICD-10-CM

## 2020-11-17 DIAGNOSIS — E11.3299 TYPE 2 DIABETES MELLITUS WITH MILD NONPROLIFERATIVE RETINOPATHY WITHOUT MACULAR EDEMA, WITH LONG-TERM CURRENT USE OF INSULIN, UNSPECIFIED LATERALITY (H): ICD-10-CM

## 2020-11-17 DIAGNOSIS — I10 HTN, GOAL BELOW 140/90: ICD-10-CM

## 2020-11-17 DIAGNOSIS — F43.10 POSTTRAUMATIC STRESS DISORDER: ICD-10-CM

## 2020-11-17 PROCEDURE — 99214 OFFICE O/P EST MOD 30 MIN: CPT | Performed by: NURSE PRACTITIONER

## 2020-11-17 PROCEDURE — 90832 PSYTX W PT 30 MINUTES: CPT | Mod: 95 | Performed by: SOCIAL WORKER

## 2020-11-17 PROCEDURE — 99606 MTMS BY PHARM EST 15 MIN: CPT | Mod: TEL | Performed by: PHARMACIST

## 2020-11-17 PROCEDURE — 99607 MTMS BY PHARM ADDL 15 MIN: CPT | Mod: TEL | Performed by: PHARMACIST

## 2020-11-17 RX ORDER — TOPIRAMATE 50 MG/1
25 TABLET, FILM COATED ORAL AT BEDTIME
Qty: 30 TABLET | Refills: 3
Start: 2020-11-17 | End: 2020-12-01

## 2020-11-17 NOTE — PROGRESS NOTES
MTM ENCOUNTER  SUBJECTIVE/OBJECTIVE:                           Nena Tang is a 60 year old female called for a follow-up visit. She was referred to me from oRwena Haas. Today's visit is a co-visit with PCP and [facility]. Today's visit is a follow-up MTM visit from 10/7/20. She was a no-show for her scheduled in-person appointment today and was changed to a phone visit.     Reason for visit: follow-up.    Tobacco: She reports that she has never smoked. She has never used smokeless tobacco.  Alcohol: not currently using    Medication Adherence/Access:   Issues reported and discussed below  Medications are set up in a pill box by HCRN    Type 2 Diabetes: Pt currently taking Lantus 60u daily, Novolog 24u BID (forgetting PM dose), Trulicity 1.5mg weekly. Pt is not experiencing side effects.    SMB-2 times daily.   Ranges (pt reported): 140-150 fasting  No hypoglycemia.  Recent symptoms of high blood sugar? none  Eye exam: up to date  Foot exam: up to date  ACEi/ARB: No.   Urine Albumin:   Lab Results   Component Value Date    MICROL 7 09/15/2020   Aspirin: Taking 81mg daily and denies side effects  Diet/Exercise: eating twice a day. Walking for exercise.  Lab Results   Component Value Date    A1C 9.7 09/15/2020    A1C 8.6 2020    A1C 6.2 2019    A1C 6.6 2019    A1C 7.6 05/15/2019     Schizoaffective: Currently taking sertraline 200mg daily, Invega 9mg daily, amantadine 100mg BID, trazodone 100mg HS, clonazepam 0.5mg HS.  Reports she has been ok. Sleeping well, not wearing CPAP.  No nightmares. No SI.   Some visual and auditory hallucination, doesn't let it bother her.  Social- playing bingo at churches.  [facility] worker visiting every Wednesday.  See [facility] notes for additional details.     Pain: over the past 3 weeks, she has noted an increase in pain in arms and legs. Muscles ache. History of back pain. She has been taking acetaminophen but not effective.     Weight problem: currently  taking topiramate 50mg daily, Trulicity 1.5mg weekly.   Wt Readings from Last 3 Encounters:   10/20/20 223 lb (101.2 kg)   10/07/20 219 lb (99.3 kg)   09/15/20 217 lb 9 oz (98.7 kg)        Today's Vitals: LMP 01/06/2015       ASSESSMENT:                              Medication Adherence: See below for considerations    Type 2 Diabetes: A1c not at goal <7%. Fasting glucose has improved with once daily administration of basal insulin. No changes today, continue to encourage adherence to PM insulin.     Schizoaffective: stable    Pain: see PCP notes for workup. Possible medication side effect of statin - will hold statin x2 weeks and monitor for changes in symptoms.     Weight problem: topiramate ineffective, will complete taper off medication.     PLAN:                            Called and spoke with HCRN regarding the following medication changes, which will be implemented 11/25:  1. Hold atorvastatin x2 weeks  2. Decrease topiramate to 25mg x2 weeks, then stop.     I spent 25 minutes with this patient today. All changes were made via collaborative practice agreement with Rowena Haas. A copy of the visit note was provided to the patient's primary care provider.    Will follow up in 2 weeks with PCP and 1 month MTM.    The patient was sent via [employer] a summary of these recommendations. See Provider note/AVS from today.     Eugenia De Jesus, PharmD, BCACP       Patient consented to a telehealth visit: yes  Telemedicine Visit Details  Type of service:  Telephone visit  Start Time: 1:40  End Time: 2:05  Originating Location (patient location): Home  Distant Location (provider location):  [facility] PRIMARY CARE MTM  Mode of Communication:  Telephone

## 2020-11-17 NOTE — PROGRESS NOTES
"Nena Tang is a 60 year old female who is being evaluated via a billable telephone visit.      The patient has been notified of following:     \"This telephone visit will be conducted via a call between you and your physician/provider. We have found that certain health care needs can be provided without the need for a physical exam.  This service lets us provide the care you need with a short phone conversation.  If a prescription is necessary we can send it directly to your pharmacy.  If lab work is needed we can place an order for that and you can then stop by our lab to have the test done at a later time.    Telephone visits are billed at different rates depending on your insurance coverage. During this emergency period, for some insurers they may be billed the same as an in-person visit.  Please reach out to your insurance provider with any questions.    If during the course of the call the physician/provider feels a telephone visit is not appropriate, you will not be charged for this service.\"    Patient has given verbal consent for Telephone visit?  Yes    What phone number would you like to be contacted at? [phone]    How would you like to obtain your AVS? Mail a copy    Subjective     Nena Tang is a 60 year old female who presents via phone visit today for the following health issues:    HPI      Diabetes Follow-up  Reports that her blood sugars have been high. Taking 60 units in the morning and trying to use the novolog with her meals.   Patient states that she has been getting 140-160 in the morning. Has been forgetting to check them in the afternoon and also taking her insulin- novolg. Not missing any of her lantus.    BP Readings from Last 2 Encounters:   10/20/20 122/70   10/07/20 120/68     Hemoglobin A1C (%)   Date Value   09/15/2020 9.7 (H)   06/30/2020 8.6 (H)     LDL Cholesterol Calculated (mg/dL)   Date Value   12/22/2019 86   08/06/2019 81       Cold Symptoms: Patient notes " that she had a cold that she is currently trying to get over. Denies any fever or chills. Denies any breathing issues.     Patient reports that her body is so sore, can hardly walk. Tylenol is not helping with the pain. Having near falls. Mostly with the back pain and the leg pain. Trying to do a lot of walking. Feels like this has been getting worse.     Lower back pain: Feels more like muscle pain. Discussed about working with PT, which she just finished although she is still struggling with the same issues. We discussed getting a lower back MRI and considering injections to help manage her pain.     Hypertension Follow-up  Patient gets her vitals done weekly by her nurse on Wednesday. Reports that the nurse states that her BP is good.     Mental Health Follow up  Patient states that she has been having some mild hallucinations. Has been playing bingo at [employer], has been going out with her son. [facility] worker has been visiting with her weekly on Wednesdays. Patient has also been going to the [employer], hence why she did not come to clinic for her appointment today.     Status since last visit: ok.     See PHQ-9 for current symptoms.  Other associated symptoms: None    Complicating factors: as noted above.   Significant life event:  No   Current substance abuse:  None  Anxiety or Panic symptoms:  No    Sleep - good, some nightmares  Appetite - good, no issues.   Exercise - some walking.       PHQ-9  English PHQ-9   Any Language               Problems taking medications regularly: Yes,  problems remembering to take    Medication side effects: none    Diet: diabetic    Social History     Tobacco Use     Smoking status: Never Smoker     Smokeless tobacco: Never Used   Substance Use Topics     Alcohol use: No     Frequency: Never     Comment: last month        Problem list and histories reviewed & adjusted, as indicated.  Additional history: as documented    Patient Active Problem List   Diagnosis     Osteopenia     Vitamin  B12 deficiency without anemia     Hyperlipidemia LDL goal <100     Rotator cuff syndrome     Type 2 diabetes mellitus with mild nonproliferative retinopathy (H)     Illiterate     Irritable bowel syndrome     overweight - BMI >35     Takotsubo cardiomyopathy     CAD (coronary artery disease)     Restless legs syndrome (RLS)     CINDY (obstructive sleep apnea)- mild AHI 10.3     Verbal auditory hallucination     Chronic low back pain     Schizoaffective disorder, depressive type (H)     Migraine headache     HTN, goal below 140/90     Health Care Home     Lumbago     Cervicalgia     Cocaine abuse, episodic (H)     Suicidal ideation     Esophageal reflux     Mild nonproliferative diabetic retinopathy (H)     Tardive dyskinesia     Alcohol use     Left cataract     Falls frequently     History of uterine cancer     Psychophysiological insomnia     Dysuria     Asymptomatic postmenopausal status     Abdominal pain, right lower quadrant     Infectious encephalopathy     Non-intractable vomiting with nausea     Thoughts of self harm     Gastroenteritis     Posttraumatic stress disorder     Cervical cancer screening     Type 2 diabetes mellitus with stage 3 chronic kidney disease, with long-term current use of insulin (H)     Positive AIDA (antinuclear antibody)     Hyperglycemia     Pneumonia     Depression with suicidal ideation     Mixed incontinence     Past Surgical History:   Procedure Laterality Date     C OOPHORECTORMY FOR JALENIG, W/BX  1983    UTERINE     CATARACT IOL, RT/LT Bilateral 2017     COLONOSCOPY N/A 3/16/2017    Procedure: COLONOSCOPY;  Surgeon: Traci Gonzalez MD;  Location: [facility]     Coronary CTA  5/21/2014     HYSTERECTOMY  1983    uterine cancer yearly pap's per provider.     LAPAROSCOPIC CHOLECYSTECTOMY  2008     PHACOEMULSIFICATION CLEAR CORNEA WITH STANDARD INTRAOCULAR LENS IMPLANT Left 5/5/2017    Procedure: PHACOEMULSIFICATION CLEAR CORNEA WITH STANDARD INTRAOCULAR LENS IMPLANT;  LEFT  EYE PHACOEMULSIFICATION CLEAR CORNEA WITH STANDARD INTRAOCULAR LENS IMPLANT ;  Surgeon: Tyra Diaz MD;  Location:  [facility]     PHACOEMULSIFICATION CLEAR CORNEA WITH STANDARD INTRAOCULAR LENS IMPLANT Right 2017    Procedure: PHACOEMULSIFICATION CLEAR CORNEA WITH STANDARD INTRAOCULAR LENS IMPLANT;  RIGHT EYE PHACOEMULSIFICATION CLEAR CORNEA WITH STANDARD INTRAOCULAR LENS IMPLANT;  Surgeon: Tyra Diaz MD;  Location:  [facility]     RELEASE TRIGGER FINGER  10/11/2012    Left thumb. Procedure: RELEASE TRIGGER FINGER;  LEFT THUMB TRIGGER RELEASE;  Surgeon: Tay Langley MD;  Location:  [facility]     RELEASE TRIGGER FINGER Right 2016    Procedure: RELEASE TRIGGER FINGER;  Surgeon: Albino Castañeda MD;  Location: [facility]       Social History     Tobacco Use     Smoking status: Never Smoker     Smokeless tobacco: Never Used   Substance Use Topics     Alcohol use: No     Frequency: Never     Comment: last month     Family History   Problem Relation Age of Onset     Cancer Mother         BLADDER     Respiratory Mother         COPD     Gastrointestinal Disease Mother         CIRRHOSIS OF LI BOLIVAR     Alcohol/Drug Mother      Diabetes Mother      Hypertension Mother      Lipids Mother      C.A.D. Mother      Glaucoma Mother      Alcohol/Drug Sister      Mental Illness Sister      Alcohol/Drug Sister      Psychotic Disorder Sister      Cancer Maternal Grandmother         UNKNOWN TYPE     Cancer Brother         COLON     Cancer - colorectal Brother         IN HIS LATE 30S     Alcohol/Drug Brother          OF HEROIN OVERDOSE AT AGE 22 YRS     Macular Degeneration No family hx of            Current Outpatient Medications   Medication Sig Dispense Refill     acetaminophen (TYLENOL) 650 MG CR tablet Take 1 tablet (650 mg) by mouth every 8 hours as needed for mild pain or fever 120 tablet 1     alcohol swab prep pads Use to swab area of injection/rodolfo as directed. 100 each 3     amantadine (SYMMETREL) 100 MG capsule  Take 1 capsule (100 mg) by mouth 2 times daily 60 capsule 3     aspirin (ASA) 81 MG EC tablet TAKE 1 TABLET (81MG) BY MOUTH DAILY 90 tablet 3     atorvastatin (LIPITOR) 80 MG tablet TAKE 1 TABLET (80MG) BY MOUTH DAILY 30 tablet 11     blood glucose (NO BRAND SPECIFIED) test strip Use to test blood sugar 4 times daily or as directed. 100 strip 11     clonazePAM (KLONOPIN) 0.5 MG tablet Take 1 tablet (0.5 mg) by mouth At Bedtime 30 tablet 3     diclofenac (VOLTAREN) 1 % topical gel Place 4 g onto the skin 4 times daily as needed for moderate pain       famotidine (PEPCID) 20 MG tablet Take 1 tablet (20 mg) by mouth daily 30 tablet 3     fluticasone-salmeterol (ADVAIR) 250-50 MCG/DOSE inhaler Inhale 1 puff into the lungs every 12 hours as needed       gabapentin (NEURONTIN) 300 MG capsule Take 1 capsule (300 mg) by mouth At Bedtime 30 capsule 3     hydrOXYzine (VISTARIL) 25 MG capsule Take 1 capsule (25 mg) by mouth every 4 hours as needed for anxiety 60 capsule 3     insulin aspart (NOVOLOG FLEXPEN) 100 UNIT/ML pen Inject 24 units under the skin 3 times daily before meals as directed. Take an extra 10 units with sugary foods. 15 mL 1     insulin glargine (LANTUS PEN) 100 UNIT/ML pen Inject 60 Units Subcutaneous every morning       insulin pen needle (NOVOFINE 30) 30G X 8 MM miscellaneous USE 4 DAILY OR AS DIRECTED 400 each 0     ipratropium - albuterol 0.5 mg/2.5 mg/3 mL (DUONEB) 0.5-2.5 (3) MG/3ML neb solution Take 1 vial (3 mLs) by nebulization every 6 hours as needed for shortness of breath / dyspnea or wheezing 30 vial 0     losartan (COZAAR) 100 MG tablet Take 1 tablet (100 mg) by mouth daily 30 tablet 3     metoprolol succinate ER (TOPROL-XL) 25 MG 24 hr tablet Take 2 tablets (50 mg) by mouth daily 180 tablet 3     miconazole (MICATIN/MICRO GUARD) 2 % external powder Apply topically once as needed       nystatin (MYCOSTATIN) 524783 UNIT/GM external cream Apply topically 2 times daily 30 g 3     order for DME  Equipment being ordered: Depends. 30 each 4     order for DME Equipment being ordered: CPAP Supplies. 1 each 0     paliperidone ER (INVEGA) 9 MG 24 hr tablet Take 1 tablet (9 mg) by mouth At Bedtime 60 tablet 1     senna-docusate (SENOKOT-S/PERICOLACE) 8.6-50 MG tablet Take 1 tablet by mouth 2 times daily as needed for constipation 60 tablet 10     sertraline (ZOLOFT) 100 MG tablet Take 2 tablets (200 mg) by mouth daily 60 tablet 3     thin (NO BRAND SPECIFIED) lancets Use with lanceting device. To accompany: Blood Glucose Monitor Brands: per insurance. 100 each 6     topiramate (TOPAMAX) 50 MG tablet Take 1 tablet (50 mg) by mouth At Bedtime 30 tablet 3     traZODone (DESYREL) 100 MG tablet Take 1 tablet (100 mg) by mouth nightly as needed for sleep 30 tablet 3     TRULICITY 1.5 MG/0.5ML pen Inject 1.5 mg Subcutaneous once a week Every Friday 4 mL 3     zinc oxide (DESITIN) 40 % external ointment Apply topically as needed for dry skin or irritation 56 g 0     Allergies   Allergen Reactions     Imidazole Antifungals Hives     Tolerates diflucan     Ketoprofen Itching     Pruritis to topical     Lisinopril Hives     Metformin Other (See Comments)     Patient hospitalized for lactic acidosis - admitting provider suspectd caused by metformin     Metronidazole Hives     Posaconazole Hives     Tolerates diflucan     Recent Labs   Lab Test 10/04/20  1224 09/15/20  1454 06/30/20  1624 12/22/19  0651 12/22/19  0651 12/03/19  1143 08/06/19  1043 08/06/19  1043 05/24/19  0916 05/24/19  0916 08/06/18  1038 08/06/18  1038   A1C  --  9.7* 8.6*  --   --  6.2*  --  6.6*  --   --    < > 6.4*   LDL  --   --   --   --  86  --   --  81  --   --   --  84   HDL  --   --   --   --  42*  --   --  39*  --   --   --  46*   TRIG  --   --   --   --  132  --   --  131  --   --   --  215*   ALT 29 29 35   < > 22 26   < >  --    < > 30   < >  --    CR 0.86 0.85 0.98   < > 0.86 0.91   < >  --    < > 0.87   < >  --    GFRESTIMATED 73 74 63   < >  74 69   < >  --    < > 73   < >  --    GFRESTBLACK 85 86 73   < > 85 80   < >  --    < > 85   < >  --    POTASSIUM 4.2 4.4 4.3   < > 4.1 4.1   < >  --    < > 4.0   < >  --    TSH  --   --   --   --  1.23  --   --   --   --  1.62   < >  --     < > = values in this interval not displayed.      BP Readings from Last 3 Encounters:   10/20/20 122/70   10/07/20 120/68   10/04/20 (!) 143/72    Wt Readings from Last 3 Encounters:   10/20/20 101.2 kg (223 lb)   10/07/20 99.3 kg (219 lb)   09/15/20 98.7 kg (217 lb 9 oz)          Labs reviewed in EPIC  Problem list, Medication list, Allergies, and Medical/Social/Surgical histories reviewed in [facility] and updated as appropriate.      Review of Systems   Constitutional, HEENT, cardiovascular, pulmonary, gi and gu systems are negative, except as otherwise noted.       Objective          Vitals:  No vitals were obtained today due to virtual visit.    PSYCH: Alert and oriented times 3; coherent speech, normal   rate and volume, able to articulate logical thoughts, able   to abstract reason, no tangential thoughts, no hallucinations   or delusions   RESP: No cough, no audible wheezing, able to talk in full sentences  Remainder of exam unable to be completed due to telephone visits    Labs reviewed in Chart.       Assessment & Plan     Chronic right-sided low back pain without sciatica  See HPI. Will plan to follow-up after the MR of low back is done.   - MR Lumbar Spine w/o Contrast; Future    CINDY (obstructive sleep apnea)- mild AHI 10.3  Patient is still not using her CPAP.   -Continued to encourage her about the benefits of using the CPAP and also for safety concerns.     Type 2 diabetes mellitus with mild nonproliferative retinopathy without macular edema, with long-term current use of insulin, unspecified laterality (H)  Uncontrolled. Needs some improvement, mostly with diet and exercise.   -Will plan to keep her insulin at the same dosing with A1C check in December.   Lab Results    Component Value Date    A1C 9.7 09/15/2020    A1C 8.6 06/30/2020    A1C 6.2 12/03/2019    A1C 6.6 08/06/2019    A1C 7.6 05/15/2019       HTN, goal below 140/90  Stable. Patient denies any symptoms.   -continue to monitor.   BP Readings from Last 3 Encounters:   10/20/20 122/70   10/07/20 120/68   10/04/20 (!) 143/72       Schizoaffective disorder, depressive type (H)  Stable. Reports some hallucinations, nothing that is bothersome to her.   - continue to monitor.          Invega (paliperidone)  High Risk Drug Monitoring? Yes  Name of Drug being monitored: Invega  Reason for drug, and what is being monitored and why: Mood/ SI, schizophrenia. A1C, CBC, Lipid, BP, BMI, and TD.  Follow-up Plan: continue to monitor.     Patient Instructions   - Hold Lipitor for a week to see if the muscle aches improve.   - Topiramate 25 mg for one week and then stop.   - Call clinic with any questions or concerns.       [facility] PRIMARY CARE [place]    Phone call duration:  30 minutes

## 2020-11-17 NOTE — PATIENT INSTRUCTIONS
- Hold Lipitor for a week to see if the muscle aches improve.   - Topiramate 25 mg for one week and then stop.   - Call clinic with any questions or concerns.

## 2020-11-17 NOTE — PROGRESS NOTES
"[facility]   November 17, 2020    Telephone visit    Nena Tang is a 60 year old female who is being evaluated via a telephone visit.      The patient has been notified of the following:     \"We have found that certain health care needs can be provided without the need for a face to face visit.  This service lets us provide the care you need with a short phone conversation.      I will have full access to your [facility] medical record during this entire phone call.   I will be taking notes for your medical record.     Since this is like an office visit, we will bill your insurance company for this service.  Please check with your medical insurance if this type of telephone visit/virtual care is covered.  You may be responsible for the cost of this service if insurance coverage is denied.      There are potential benefits and risks of telephone visits (e.g. limits to patient confidentiality) that differ from in-person visits.?  Confidentiality still applies for telephone services, and nobody will record the visit.  It is important to be in a quiet, private space that is free of distractions (including cell phone or other devices) during the visit.??     If during the course of the call I believe a telephone visit is not appropriate, you will not be charged for this service\"    Consent has been obtained for this service by care team member: yes.    Start time: 1:35pm    End time: 2:pm    Behavioral Health Clinician Progress Note    Patient Name: Nena Tang           Service Type:  Individual      Service Location:   Phone call (patient / identified key support person reached)     Session Start Time: 1:35 pm  Session End Time:  2:pm      Session Length: 16 - 37      Attendees: Patient, PCP and MTM    Visit Activities (Refresh list every visit): [facility]     Diagnostic Assessment Date: 1-23-18 Updated DA completed December 12, 2019  Treatment Plan Review Date: " 2-  CGI date completed: 11-    See Flowsheets for today's PHQ-9 and TAMIA-7 results  Previous PHQ-9:   PHQ-9 SCORE 3/13/2018 10/26/2018 5/7/2019   PHQ-9 Total Score - - -   PHQ-9 Total Score - - -   PHQ-9 Total Score 14 20 22     Previous TAMIA-7:   TAMIA-7 SCORE 2/3/2017 3/13/2018 10/26/2018   Total Score - - -   Total Score 0 17 19   Total Score - - -       ALEXANDRA LEVEL:  ALEXANDRA Score (Last Two) 8/30/2011 6/9/2014   ALEXANDRA Raw Score 42 37   Activation Score 66 49.9   ALEXANDRA Level 3 2       DATA  Extended Session (60+ minutes): No  Interactive Complexity: No  Crisis: No  [facility] Patient: No    Treatment Objective(s) Addressed in This Session:  Target Behavior(s): disease management/lifestyle changes mental health    Depressed Mood: Increase interest, engagement, and pleasure in doing things  Decrease frequency and intensity of feeling down, depressed, hopeless  Improve quantity and quality of night time sleep / decrease daytime naps  Identify negative self-talk and behaviors: challenge core beliefs, myths, and actions  Improve concentration, focus, and mindfulness in daily activities   Anxiety: will experience a reduction in anxiety and will develop more effective coping skills to manage anxiety symptoms  Thought Disturbance: will develop skills to more effectively manage symptoms and will continue to take medications as prescribed  Psychological distress related to Diabetes    Current Stressors / Issues:    [facility] co-visit with patient and PCP in the clinic. Patient reports that she has been having some struggles with management of her health and she talked with PCP and MTM about her blood sugars levels and management and how she is forgetting to follow her treatment during the day-she is having the intention of checking her blood sugars everyday-not using CPAP machine-(I'm going to use the CPAP machine)-she stated that she needs to clean the machine and that she will use the machine-regarding sleeping she stated she has  been sleeping okay and she has been having nightmares-she stated no thoughts to want to hurt self-some hallucination auditory and visual and she stated that she jose by not paying it any attention-drop in center and her workers are going to her home to help her get back in the drop in center and this worker is her ARM worker -she has in home nurse that comes once a week         Progress on Treatment Objective(s) / Homework:  Minimal progress - ACTION (Actively working towards change); Intervened by reinforcing change plan / affirming steps taken    Motivational Interviewing    MI Intervention: Expressed Empathy/Understanding, Supported Autonomy, Collaboration, Evocation, Permission to raise concern or advise, Open-ended questions, Reflections: simple and complex, Rolled with resistance: Emphasized patient autonomy, Simple reflection and Evoked patient agenda and Change talk (evoked)     Change Talk Expressed by the Patient: Desire to change Ability to change Reasons to change Need to change Committment to change Activation Taking steps    Provider Response to Change Talk: E - Evoked more info from patient about behavior change, A - Affirmed patient's thoughts, decisions, or attempts at behavior change, R - Reflected patient's change talk and S - Summarized patient's change talk statements      Care Plan review completed: No    Medication Review:  No changes to current psychiatric medication(s)     Medication Compliance:  Yes    Changes in Health Issues:   Yes: please see medical note by PCP     Chemical Use Review:   Substance Use: Chemical use reviewed, no active concerns identified      Tobacco Use: No current tobacco use.      Assessment: Current Emotional / Mental Status (status of significant symptoms):  Risk status (Self / Other harm or suicidal ideation)  Patient has had a history of suicidal ideation: yes  Patient denies current fears or concerns for personal safety.  Patient denies current or recent  suicidal ideation or behaviors.  Patient denies current or recent homicidal ideation or behaviors.  Patient denies current or recent self injurious behavior or ideation.  Patient denies other safety concerns.  A safety and risk management plan has not been developed at this time, however patient was encouraged to call [place] [phone] should there be a change in any of these risk factors.    Appearance:   This visit occured over the phone    Eye Contact:   This visit occured over the phone    Psychomotor Behavior: This visit occured over the phone    Attitude:   Cooperative   Orientation:   All  Speech   Rate / Production: Normal    Volume:  Normal   Mood:    Anxious  Normal Sad   Affect:    This visit occured over the phone    Thought Content:  Clear   Thought Form:  Coherent  Goal Directed  Logical   Insight:    Fair     Diagnoses:  1. Schizoaffective disorder, bipolar type (H)    2. Posttraumatic stress disorder        Collateral Reports Completed:  Not Applicable    Plan: (Homework, other):  Patient was given information about behavioral services and encouraged to schedule a follow up appointment with the clinic [facility] as needed to work on helping the patient with management of mood states and to work on stress management around her grieving process-also to help the patient with her behavioral needs to comply with treatment medical recommendations.  She was also given information about mental health symptoms and treatment options .  CD Recommendations: Maintain Sobriety.    BIN Lee, [facility]                                                    Treatment Plan    Client's Name: Nena Tang  YOB: 1960    Date: Reviewed/updated on 11/17/2020    DSM5 Diagnoses: (Sustained by DSM5 Criteria Listed Above)  Diagnoses:  295.70 (F25.0), Schizoaffective disorder, depressive type; 309.81 (F43.10) Posttraumatic Stress Disorder with panic specifier, history of chemical dependency issues (polysubstance  dependence)  Psychosocial & Contextual Factors: Academics / Education - yes  Activities of Daily Living - yes  Financial management yes  Follow through with Medical recommendations - yes  Occupational / Vocational - yes  Social / Relational - yes, grief and loss  WHODAS Score: 30      Referral / Collaboration:  Referral to another professional/service is not indicated at this time..    Anticipated number of session or this episode of care: 20      MeasurableTreatment Goal(s) related to diagnosis / functional impairment(s)  Goal 1: Client will improve her ability to manage anxiety and mood states.     I will know I've met my goal when the man does not paralyze the me with fear.     Objective #A (Client Action)    Client will increase understanding of steps in the grief process.  Status: Continued - Date(s):  11/17/2020 the patient reported that she has been dealing with her grief in a good way-she has been spending time with her family and has some activities that she enjoys.    Intervention(s)  Therapist will teach emotional recognition/identification. teach the navigation of grieving encouraging acceptance and adjustment.    Objective #B  Client will Decrease frequency and intensity of feeling down, depressed, hopeless  Decrease thoughts that you'd be better off dead or of suicide / self-harm.  Status: Continued - Date(s): 11/17/2020 the patient has been more active spending time with her son-she has been sleeping good-she stated that she has been sleeping late-no thoughts of suicide-she has been doing a lot of walking-she has improved her ability manage her health issues.    Intervention(s)  Therapist will teach emotional regulation skills. with the use of mindful coping strategies and open communication of feelings and thoughts (getting it out to another person)    Client has reviewed and agreed to the above plan.      Richardson Lopez,  LICSW  11/17/2020        ______________________________________________________________________

## 2020-11-19 ENCOUNTER — HOSPITAL ENCOUNTER (OUTPATIENT)
Dept: MRI IMAGING | Facility: CLINIC | Age: 60
Discharge: HOME | End: 2020-11-19
Attending: NURSE PRACTITIONER | Admitting: NURSE PRACTITIONER
Payer: COMMERCIAL

## 2020-11-19 ENCOUNTER — MEDICAL CORRESPONDENCE (OUTPATIENT)
Dept: HEALTH INFORMATION MANAGEMENT | Facility: CLINIC | Age: 60
End: 2020-11-19

## 2020-11-19 DIAGNOSIS — M54.50 CHRONIC RIGHT-SIDED LOW BACK PAIN WITHOUT SCIATICA: ICD-10-CM

## 2020-11-19 DIAGNOSIS — G89.29 CHRONIC RIGHT-SIDED LOW BACK PAIN WITHOUT SCIATICA: ICD-10-CM

## 2020-11-19 PROCEDURE — 72148 MRI LUMBAR SPINE W/O DYE: CPT | Mod: 26 | Performed by: RADIOLOGY

## 2020-11-19 PROCEDURE — 72148 MRI LUMBAR SPINE W/O DYE: CPT

## 2020-11-20 ENCOUNTER — TELEPHONE (OUTPATIENT)
Dept: FAMILY MEDICINE | Facility: CLINIC | Age: 60
End: 2020-11-20

## 2020-11-20 NOTE — TELEPHONE ENCOUNTER
Forms completed and faxed back to [facility]. @ [phone]. Placed into [employer] to scan into patients chart.   Michael Gustafson MA

## 2020-11-20 NOTE — PLAN OF CARE
PA was removed from cover my meds d/t not covered. HUGO   Problem: Overarching Goals (Adult)  Goal: Adheres to Safety Considerations for Self and Others  Outcome: No Change   01/16/18 2215   OTHER   Adheres to Safety Considerations for Self and Others making progress toward outcome     Intervention: Develop/Maintain Individualized Safety Plan   01/16/18 2215   Develop/Maintain Individualized Safety Plan   Safety Measures belongings check completed;clinical history reviewed;environmental rounds completed;safety plan mutually developed;safety plan reviewed;safety rounds completed;self-directed behavior promoted;suicide assessment completed;suicide check-in completed   Homicide Risk   Feels Like Hurting Others no   Suicide Risk   Have you ever thought about hurting yourself now or in the past? other (see comments)  (in the past)       Goal: Optimized Coping Skills in Response to Life Stressors  Outcome: Improving   01/16/18 2215   OTHER   Optimized Coping Skills in Response to Life Stressors making progress toward outcome     Intervention: Promote Effective Coping Strategies   01/16/18 2215   Coping/Psychosocial Interventions   Supportive Measures active listening utilized;counseling provided;decision-making supported;goal setting facilitated;journaling promoted;positive reinforcement provided;problem solving facilitated;relaxation techniques promoted;self-care encouraged;self-reflection promoted;self-responsibility promoted

## 2020-11-25 DIAGNOSIS — F25.1 SCHIZOAFFECTIVE DISORDER, DEPRESSIVE TYPE (H): ICD-10-CM

## 2020-11-25 RX ORDER — TRAZODONE HYDROCHLORIDE 100 MG/1
100 TABLET ORAL
Qty: 30 TABLET | Refills: 3 | Status: SHIPPED | OUTPATIENT
Start: 2020-11-25 | End: 2021-02-02

## 2020-11-25 NOTE — TELEPHONE ENCOUNTER
Reason for Call:  Medication or medication refill:    Do you use a [facility]?  Name of the pharmacy and phone number for the current request:  [facility], [place] - [address]    Name of the medication requested: traZODone (DESYREL) 100 MG tablet    Other request: Pharmacy has been sending multiple requests and they have not heard back from the clinic for a couple of week. Please address this refill ASAP as the patient is completely out of this medication.     Can we leave a detailed message on this number? Not Applicable    Phone number patient can be reached at: Other phone number:  [phone]    Best Time: ASAP    Call taken on 11/25/2020 at 9:34 AM by Maria Elena Olsen      Requested Prescriptions   Pending Prescriptions Disp Refills     traZODone (DESYREL) 100 MG tablet 30 tablet 3     Sig: Take 1 tablet (100 mg) by mouth nightly as needed for sleep       There is no refill protocol information for this order

## 2020-11-27 ENCOUNTER — MEDICAL CORRESPONDENCE (OUTPATIENT)
Dept: HEALTH INFORMATION MANAGEMENT | Facility: CLINIC | Age: 60
End: 2020-11-27

## 2020-12-01 ENCOUNTER — OFFICE VISIT (OUTPATIENT)
Dept: FAMILY MEDICINE | Facility: CLINIC | Age: 60
End: 2020-12-01
Payer: COMMERCIAL

## 2020-12-01 VITALS
DIASTOLIC BLOOD PRESSURE: 74 MMHG | HEART RATE: 88 BPM | SYSTOLIC BLOOD PRESSURE: 130 MMHG | RESPIRATION RATE: 16 BRPM | WEIGHT: 216.31 LBS | OXYGEN SATURATION: 96 % | HEIGHT: 60 IN | BODY MASS INDEX: 42.47 KG/M2 | TEMPERATURE: 97 F

## 2020-12-01 DIAGNOSIS — E78.5 HYPERLIPIDEMIA LDL GOAL <100: ICD-10-CM

## 2020-12-01 DIAGNOSIS — Z12.31 ENCOUNTER FOR SCREENING MAMMOGRAM FOR BREAST CANCER: ICD-10-CM

## 2020-12-01 DIAGNOSIS — Z79.4 TYPE 2 DIABETES MELLITUS WITH MILD NONPROLIFERATIVE RETINOPATHY WITHOUT MACULAR EDEMA, WITH LONG-TERM CURRENT USE OF INSULIN, UNSPECIFIED LATERALITY (H): Primary | ICD-10-CM

## 2020-12-01 DIAGNOSIS — F25.0 SCHIZOAFFECTIVE DISORDER, BIPOLAR TYPE (H): ICD-10-CM

## 2020-12-01 DIAGNOSIS — M54.50 CHRONIC RIGHT-SIDED LOW BACK PAIN WITHOUT SCIATICA: ICD-10-CM

## 2020-12-01 DIAGNOSIS — G89.29 CHRONIC RIGHT-SIDED LOW BACK PAIN WITHOUT SCIATICA: ICD-10-CM

## 2020-12-01 DIAGNOSIS — N39.46 MIXED INCONTINENCE: ICD-10-CM

## 2020-12-01 DIAGNOSIS — E11.3299 TYPE 2 DIABETES MELLITUS WITH MILD NONPROLIFERATIVE RETINOPATHY WITHOUT MACULAR EDEMA, WITH LONG-TERM CURRENT USE OF INSULIN, UNSPECIFIED LATERALITY (H): Primary | ICD-10-CM

## 2020-12-01 LAB — HBA1C MFR BLD: 9.1 % (ref 0–5.6)

## 2020-12-01 PROCEDURE — 83036 HEMOGLOBIN GLYCOSYLATED A1C: CPT | Performed by: NURSE PRACTITIONER

## 2020-12-01 PROCEDURE — 80061 LIPID PANEL: CPT | Performed by: NURSE PRACTITIONER

## 2020-12-01 PROCEDURE — 99215 OFFICE O/P EST HI 40 MIN: CPT | Performed by: NURSE PRACTITIONER

## 2020-12-01 PROCEDURE — 36415 COLL VENOUS BLD VENIPUNCTURE: CPT | Performed by: NURSE PRACTITIONER

## 2020-12-01 RX ORDER — SERTRALINE HYDROCHLORIDE 100 MG/1
200 TABLET, FILM COATED ORAL DAILY
Qty: 60 TABLET | Refills: 3 | Status: SHIPPED | OUTPATIENT
Start: 2020-12-01 | End: 2021-03-10 | Stop reason: ALTCHOICE

## 2020-12-01 ASSESSMENT — MIFFLIN-ST. JEOR: SCORE: 1477.69

## 2020-12-01 ASSESSMENT — PATIENT HEALTH QUESTIONNAIRE - PHQ9: SUM OF ALL RESPONSES TO PHQ QUESTIONS 1-9: 13

## 2020-12-01 NOTE — PROGRESS NOTES
SUBJECTIVE:                                                    Nena Tang is a 60 year old female who presents to clinic today for the following health issues:      Diabetes Follow-up  Patient reports that she is checking her blood sugars only in the morning. Last couple of days have been 400's x 2  in the morning. And was previously 150-170's.   Not sure if she missed her shots on those two day of the high sugars. Was at her son's house, playing with her grand kids. Still using Basaglar 60 units in the morning and 24 for novolog about 2-3 times per day.     BP Readings from Last 2 Encounters:   12/01/20 130/74   10/20/20 122/70     Hemoglobin A1C (%)   Date Value   09/15/2020 9.7 (H)   06/30/2020 8.6 (H)     LDL Cholesterol Calculated (mg/dL)   Date Value   12/22/2019 86   08/06/2019 81       Hypertension Follow-up  Patient gets her BP checked weekly and was told by her nurse that her BP's are doing good. Denies any chest pain, shortness of breath, heart palpitations or syncopal episodes/dizziness.     Chronic Pain: Patient reports that she is still having muscle aches and low back pain through the legs. Not necessarily joint pain. Patient dose not report any changes with holding her statin for 2 weeks. Will keep her at her previous statin dose and have her follow-up with the pain clinic.     Constipation: had some constipation but was able to take care of this with her senna.     Incontinence: Patient reports that she is still struggling with incontinence issues. Mostly urinal and will have some bowel incontinence with loose stools on occasion. Trayn is open to try pelvic floor physical therapy. Will continue with depends for now and schedule for the therapy.       Mental Health Follow up: Depression.   Patient reports that she was at her son's house visiting. Watched TV today. Patient reports that she does not feel depressed. States that her mood is good. She reports that her left leg started moving  again- from her medications. Patient denies any increase in her anxiety.   ILS still looking into getting her things to do in the day time; otherwise watching tv and laying around.   Shell- her best friend will be picking her up today.   Patient has a PCA who comes out for 6 hours M-F; helps with shower, and talks, keeps her company and also reminds her to take medications.     Status since last visit: increase in pain.     See PHQ-9 for current symptoms.  Other associated symptoms: None    Complicating factors: as noted above.   Significant life event:  No   Current substance abuse:  None  Anxiety or Panic symptoms:  No    Sleep - good. Needs to get her CPAP cleaned before she can use.   Appetite - trying to eat healthier.   Exercise - trying to walk, hips and legs start bothering her.     Denies any alcohol or substance use.     PHQ-9  English PHQ-9   Any Language             Problems taking medications regularly: No    Medication side effects: none    Diet: regular (no restrictions)    Social History     Tobacco Use     Smoking status: Never Smoker     Smokeless tobacco: Never Used   Substance Use Topics     Alcohol use: No     Frequency: Never     Comment: last month        Problem list and histories reviewed & adjusted, as indicated.  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 (H)     Illiterate     Irritable bowel syndrome     overweight - BMI >35     Takotsubo cardiomyopathy     CAD (coronary artery disease)     Restless legs syndrome (RLS)     CINDY (obstructive sleep apnea)- mild AHI 10.3     Verbal auditory hallucination     Chronic low back pain     Schizoaffective disorder, depressive type (H)     Migraine headache     HTN, goal below 140/90     Health Care Home     Lumbago     Cervicalgia      Cocaine abuse, episodic (H)     Suicidal ideation     Esophageal reflux     Mild nonproliferative diabetic retinopathy (H)     Tardive dyskinesia     Alcohol use     Left cataract     Falls frequently     History of uterine cancer     Psychophysiological insomnia     Dysuria     Asymptomatic postmenopausal status     Abdominal pain, right lower quadrant     Infectious encephalopathy     Non-intractable vomiting with nausea     Thoughts of self harm     Gastroenteritis     Posttraumatic stress disorder     Cervical cancer screening     Type 2 diabetes mellitus with stage 3 chronic kidney disease, with long-term current use of insulin (H)     Positive AIDA (antinuclear antibody)     Hyperglycemia     Pneumonia     Depression with suicidal ideation     Mixed incontinence     Past Surgical History:   Procedure Laterality Date     C OOPHORECTORMY FOR MALIG, W/BX  1983    UTERINE     CATARACT IOL, RT/LT Bilateral 2017     COLONOSCOPY N/A 3/16/2017    Procedure: COLONOSCOPY;  Surgeon: Traci Gonzalez MD;  Location:  GI     Coronary CTA  5/21/2014     HYSTERECTOMY  1983    uterine cancer yearly pap's per provider.     LAPAROSCOPIC CHOLECYSTECTOMY  2008     PHACOEMULSIFICATION CLEAR CORNEA WITH STANDARD INTRAOCULAR LENS IMPLANT Left 5/5/2017    Procedure: PHACOEMULSIFICATION CLEAR CORNEA WITH STANDARD INTRAOCULAR LENS IMPLANT;  LEFT EYE PHACOEMULSIFICATION CLEAR CORNEA WITH STANDARD INTRAOCULAR LENS IMPLANT ;  Surgeon: Tyra Diaz MD;  Location: [facility]     PHACOEMULSIFICATION CLEAR CORNEA WITH STANDARD INTRAOCULAR LENS IMPLANT Right 6/30/2017    Procedure: PHACOEMULSIFICATION CLEAR CORNEA WITH STANDARD INTRAOCULAR LENS IMPLANT;  RIGHT EYE PHACOEMULSIFICATION CLEAR CORNEA WITH STANDARD INTRAOCULAR LENS IMPLANT;  Surgeon: Tyra Diaz MD;  Location:  [facility]     RELEASE TRIGGER FINGER  10/11/2012    Left thumb. Procedure: RELEASE TRIGGER FINGER;  LEFT THUMB TRIGGER RELEASE;  Surgeon: Tay Langley MD;   Location:  [facility]     RELEASE TRIGGER FINGER Right 2016    Procedure: RELEASE TRIGGER FINGER;  Surgeon: Albino Castañeda MD;  Location: [facility]       Social History     Tobacco Use     Smoking status: Never Smoker     Smokeless tobacco: Never Used   Substance Use Topics     Alcohol use: No     Frequency: Never     Comment: last month     Family History   Problem Relation Age of Onset     Cancer Mother         BLADDER     Respiratory Mother         COPD     Gastrointestinal Disease Mother         CIRRHOSIS OF LI BOLIVAR     Alcohol/Drug Mother      Diabetes Mother      Hypertension Mother      Lipids Mother      C.A.D. Mother      Glaucoma Mother      Alcohol/Drug Sister      Mental Illness Sister      Alcohol/Drug Sister      Psychotic Disorder Sister      Cancer Maternal Grandmother         UNKNOWN TYPE     Cancer Brother         COLON     Cancer - colorectal Brother         IN HIS LATE 30S     Alcohol/Drug Brother          OF HEROIN OVERDOSE AT AGE 22 YRS     Macular Degeneration No family hx of            Current Outpatient Medications   Medication Sig Dispense Refill     acetaminophen (TYLENOL) 650 MG CR tablet Take 1 tablet (650 mg) by mouth every 8 hours as needed for mild pain or fever 120 tablet 1     alcohol swab prep pads Use to swab area of injection/rodolfo as directed. 100 each 3     amantadine (SYMMETREL) 100 MG capsule Take 1 capsule (100 mg) by mouth 2 times daily 60 capsule 3     aspirin (ASA) 81 MG EC tablet TAKE 1 TABLET (81MG) BY MOUTH DAILY 90 tablet 3     atorvastatin (LIPITOR) 80 MG tablet TAKE 1 TABLET (80MG) BY MOUTH DAILY 30 tablet 11     blood glucose (NO BRAND SPECIFIED) test strip Use to test blood sugar 4 times daily or as directed. 100 strip 11     clonazePAM (KLONOPIN) 0.5 MG tablet Take 1 tablet (0.5 mg) by mouth At Bedtime 30 tablet 3     diclofenac (VOLTAREN) 1 % topical gel Place 4 g onto the skin 4 times daily as needed for moderate pain       famotidine (PEPCID) 20 MG  tablet Take 1 tablet (20 mg) by mouth daily 30 tablet 3     fluticasone-salmeterol (ADVAIR) 250-50 MCG/DOSE inhaler Inhale 1 puff into the lungs every 12 hours as needed       gabapentin (NEURONTIN) 300 MG capsule Take 1 capsule (300 mg) by mouth At Bedtime 30 capsule 3     hydrOXYzine (VISTARIL) 25 MG capsule Take 1 capsule (25 mg) by mouth every 4 hours as needed for anxiety 60 capsule 3     insulin aspart (NOVOLOG FLEXPEN) 100 UNIT/ML pen Inject 24 units under the skin 3 times daily before meals as directed. Take an extra 10 units with sugary foods. 15 mL 1     insulin glargine (LANTUS PEN) 100 UNIT/ML pen Inject 60 Units Subcutaneous every morning       insulin pen needle (NOVOFINE 30) 30G X 8 MM miscellaneous USE 4 DAILY OR AS DIRECTED 400 each 0     ipratropium - albuterol 0.5 mg/2.5 mg/3 mL (DUONEB) 0.5-2.5 (3) MG/3ML neb solution Take 1 vial (3 mLs) by nebulization every 6 hours as needed for shortness of breath / dyspnea or wheezing 30 vial 0     losartan (COZAAR) 100 MG tablet Take 1 tablet (100 mg) by mouth daily 30 tablet 3     metoprolol succinate ER (TOPROL-XL) 25 MG 24 hr tablet Take 2 tablets (50 mg) by mouth daily 180 tablet 3     miconazole (MICATIN/MICRO GUARD) 2 % external powder Apply topically once as needed       nystatin (MYCOSTATIN) 008547 UNIT/GM external cream Apply topically 2 times daily 30 g 3     order for DME Equipment being ordered: Depends. 30 each 4     order for DME Equipment being ordered: CPAP Supplies. 1 each 0     paliperidone ER (INVEGA) 9 MG 24 hr tablet Take 1 tablet (9 mg) by mouth At Bedtime 60 tablet 1     senna-docusate (SENOKOT-S/PERICOLACE) 8.6-50 MG tablet Take 1 tablet by mouth 2 times daily as needed for constipation 60 tablet 10     sertraline (ZOLOFT) 100 MG tablet TAKE 2 TABLETS (200 MG) BY MOUTH DAILY 60 tablet 3     thin (NO BRAND SPECIFIED) lancets Use with lanceting device. To accompany: Blood Glucose Monitor Brands: per insurance. 100 each 6     topiramate  (TOPAMAX) 50 MG tablet Take 0.5 tablets (25 mg) by mouth At Bedtime for 14 days Then stop. 30 tablet 3     traZODone (DESYREL) 100 MG tablet Take 1 tablet (100 mg) by mouth nightly as needed for sleep 30 tablet 3     TRULICITY 1.5 MG/0.5ML pen Inject 1.5 mg Subcutaneous once a week Every Friday 4 mL 3     zinc oxide (DESITIN) 40 % external ointment Apply topically as needed for dry skin or irritation 56 g 0     Allergies   Allergen Reactions     Imidazole Antifungals Hives     Tolerates diflucan     Ketoprofen Itching     Pruritis to topical     Lisinopril Hives     Metformin Other (See Comments)     Patient hospitalized for lactic acidosis - admitting provider suspectd caused by metformin     Metronidazole Hives     Posaconazole Hives     Tolerates diflucan     Recent Labs   Lab Test 10/04/20  1224 09/15/20  1454 06/30/20  1624 12/22/19  0651 12/22/19  0651 12/03/19  1143 08/06/19  1043 08/06/19  1043 05/24/19  0916 05/24/19  0916 08/06/18  1038 08/06/18  1038   A1C  --  9.7* 8.6*  --   --  6.2*  --  6.6*  --   --    < > 6.4*   LDL  --   --   --   --  86  --   --  81  --   --   --  84   HDL  --   --   --   --  42*  --   --  39*  --   --   --  46*   TRIG  --   --   --   --  132  --   --  131  --   --   --  215*   ALT 29 29 35   < > 22 26   < >  --    < > 30   < >  --    CR 0.86 0.85 0.98   < > 0.86 0.91   < >  --    < > 0.87   < >  --    GFRESTIMATED 73 74 63   < > 74 69   < >  --    < > 73   < >  --    GFRESTBLACK 85 86 73   < > 85 80   < >  --    < > 85   < >  --    POTASSIUM 4.2 4.4 4.3   < > 4.1 4.1   < >  --    < > 4.0   < >  --    TSH  --   --   --   --  1.23  --   --   --   --  1.62   < >  --     < > = values in this interval not displayed.      BP Readings from Last 3 Encounters:   12/01/20 130/74   10/20/20 122/70   10/07/20 120/68    Wt Readings from Last 3 Encounters:   12/01/20 98.1 kg (216 lb 5 oz)   10/20/20 101.2 kg (223 lb)   10/07/20 99.3 kg (219 lb)          Labs reviewed in EPIC  Problem list,  Medication list, Allergies, and Medical/Social/Surgical histories reviewed in [facility] and updated as appropriate.    ROS: Constitutional, neuro, ENT, endocrine, pulmonary, cardiac, gastrointestinal, genitourinary, musculoskeletal, integument and psychiatric systems are negative, except as otherwise noted above in the HPI.     OBJECTIVE:                                                    /74   Pulse 88   Temp 97  F (36.1  C) (Temporal)   Resp 16   Ht 1.524 m (5')   Wt 98.1 kg (216 lb 5 oz)   LMP 01/06/2015   SpO2 96%   BMI 42.25 kg/m    Body mass index is 42.25 kg/m .    GENERAL: healthy, alert and no distress  EYES: Eyes grossly normal to inspection, PERRL and conjunctivae and sclerae normal  NECK: no adenopathy, no asymmetry, masses, or scars and thyroid normal to palpation  RESP: lungs clear to auscultation - no rales, rhonchi or wheezes  CV: regular rate and rhythm, normal S1 S2, no S3 or S4, no murmur, click or rub, no peripheral edema and peripheral pulses strong  ABDOMEN: soft, nontender, no hepatosplenomegaly, no masses and bowel sounds normal  MS: low back pain with movement, no gross musculoskeletal defects noted, no edema  NEURO: Normal strength and tone, mentation intact and speech normal  PSYCH: mentation appears normal, affect normal/bright    Mental Status Assessment:  Appearance:   Appropriate   Eye Contact:   Good   Psychomotor Behavior: Normal  Restless leg  Attitude:   Cooperative   Orientation:   All  Speech   Rate / Production: Normal    Volume:  Normal   Mood:    Normal  Affect:    Appropriate  Lethargic   Thought Content:  Clear   Thought Form:  Coherent  Logical   Insight:    Fair   Attention Span and Concentration: appropriate  Recent and Remote Memory:  intact  Fund of Knowledge: appropriate  Muscle Strength and Tone: normal   Suicidal Ideation: reports no thoughts, no intention      See [facility] notes     Diagnostic Test Results:  Results for orders placed or performed in visit on  12/01/20   **A1C FUTURE anytime     Status: Abnormal   Result Value Ref Range    Hemoglobin A1C 9.1 (H) 0 - 5.6 %   Lipid panel reflex to direct LDL Non-fasting     Status: Abnormal   Result Value Ref Range    Cholesterol 215 (H) <200 mg/dL    Triglycerides 141 <150 mg/dL    HDL Cholesterol 46 (L) >49 mg/dL    LDL Cholesterol Calculated 141 (H) <100 mg/dL    Non HDL Cholesterol 169 (H) <130 mg/dL        ASSESSMENT/PLAN:                                                    1. Type 2 diabetes mellitus with mild nonproliferative retinopathy without macular edema, with long-term current use of insulin, unspecified laterality (H)  Uncontrolled. Encouraged patient to continue to be more diligent with her insulin compliance to help improve her diabetes. Continue to monitor for now.   Lab Results   Component Value Date    A1C 9.1 12/01/2020    A1C 9.7 09/15/2020    A1C 8.6 06/30/2020    A1C 6.2 12/03/2019    A1C 6.6 08/06/2019     - **A1C FUTURE anytime    2. Mixed incontinence  See HPI. Referral in place for pelvic floor therapy.   - PHYSICAL THERAPY REFERRAL; Future    3. Encounter for screening mammogram for breast cancer  Routine screening.   - *MA Screening Digital Bilateral; Future    4. Hyperlipidemia LDL goal <100  Discussed with patient about eating more healthier and trying to be more active to help reduce her risks for stroke.  - Continue with her atorvastatin dose as previously prescribed, since no changes noted with her symptoms.   - Lipid panel reflex to direct LDL Non-fasting    5. Chronic right-sided low back pain without sciatica  Patient continues to complain of her ongoing low back pain. Encouraged her to reach out to the pain clinic and get an appointment schedule to help manage her symptoms.   - Paper referral provided to patient with the phone number to call for her appointment.       Patient Instructions   - Schedule to get CPAP cleaned at [facility].   - Check in with Don for therapy in 2 weeks.    - Call pain clinic next week if they do not call by the end of the week.   - Call clinic with any questions or concerns.     I spent Greater than 40 minutes was spent face to face with Nena Tang, with greater than 50 % in counselling and consultation about diabetes, routine screening, incontinence, and low back pain.     ART Fay CNP  [facility] [place]

## 2020-12-01 NOTE — PATIENT INSTRUCTIONS
- Schedule to get CPAP cleaned at [facility].   - Check in with Don for therapy in 2 weeks.   - Call pain clinic next week if they do not call by the end of the week.   - Call clinic with any questions or concerns.

## 2020-12-02 ENCOUNTER — TELEPHONE (OUTPATIENT)
Dept: PALLIATIVE MEDICINE | Facility: CLINIC | Age: 60
End: 2020-12-02

## 2020-12-02 LAB
CHOLEST SERPL-MCNC: 215 MG/DL
HDLC SERPL-MCNC: 46 MG/DL
LDLC SERPL CALC-MCNC: 141 MG/DL
NONHDLC SERPL-MCNC: 169 MG/DL
TRIGL SERPL-MCNC: 141 MG/DL

## 2020-12-02 NOTE — TELEPHONE ENCOUNTER

## 2020-12-04 ENCOUNTER — MEDICAL CORRESPONDENCE (OUTPATIENT)
Dept: HEALTH INFORMATION MANAGEMENT | Facility: CLINIC | Age: 60
End: 2020-12-04

## 2020-12-09 ENCOUNTER — ANCILLARY PROCEDURE (OUTPATIENT)
Dept: MAMMOGRAPHY | Facility: CLINIC | Age: 60
End: 2020-12-09
Attending: NURSE PRACTITIONER
Payer: COMMERCIAL

## 2020-12-09 DIAGNOSIS — Z12.31 ENCOUNTER FOR SCREENING MAMMOGRAM FOR BREAST CANCER: ICD-10-CM

## 2020-12-09 PROCEDURE — 77067 SCR MAMMO BI INCL CAD: CPT

## 2020-12-09 PROCEDURE — 77067 SCR MAMMO BI INCL CAD: CPT | Mod: 26 | Performed by: RADIOLOGY

## 2020-12-10 ENCOUNTER — MEDICAL CORRESPONDENCE (OUTPATIENT)
Dept: HEALTH INFORMATION MANAGEMENT | Facility: CLINIC | Age: 60
End: 2020-12-10

## 2020-12-10 DIAGNOSIS — K21.9 GASTROESOPHAGEAL REFLUX DISEASE WITHOUT ESOPHAGITIS: ICD-10-CM

## 2020-12-11 RX ORDER — FAMOTIDINE 20 MG/1
20 TABLET, FILM COATED ORAL DAILY
Qty: 30 TABLET | Refills: 3 | Status: SHIPPED | OUTPATIENT
Start: 2020-12-11 | End: 2021-02-24

## 2020-12-14 ENCOUNTER — TELEPHONE (OUTPATIENT)
Dept: BEHAVIORAL HEALTH | Facility: CLINIC | Age: 60
End: 2020-12-14

## 2020-12-14 DIAGNOSIS — G89.29 CHRONIC RIGHT-SIDED LOW BACK PAIN WITHOUT SCIATICA: ICD-10-CM

## 2020-12-14 DIAGNOSIS — M54.50 CHRONIC RIGHT-SIDED LOW BACK PAIN WITHOUT SCIATICA: ICD-10-CM

## 2020-12-15 ENCOUNTER — MEDICAL CORRESPONDENCE (OUTPATIENT)
Dept: HEALTH INFORMATION MANAGEMENT | Facility: CLINIC | Age: 60
End: 2020-12-15

## 2020-12-15 ENCOUNTER — OFFICE VISIT (OUTPATIENT)
Dept: BEHAVIORAL HEALTH | Facility: CLINIC | Age: 60
End: 2020-12-15
Payer: COMMERCIAL

## 2020-12-15 ENCOUNTER — VIRTUAL VISIT (OUTPATIENT)
Dept: PALLIATIVE MEDICINE | Facility: CLINIC | Age: 60
End: 2020-12-15
Payer: COMMERCIAL

## 2020-12-15 DIAGNOSIS — G89.29 CHRONIC BILATERAL LOW BACK PAIN WITHOUT SCIATICA: Primary | ICD-10-CM

## 2020-12-15 DIAGNOSIS — F25.0 SCHIZOAFFECTIVE DISORDER, BIPOLAR TYPE (H): Primary | ICD-10-CM

## 2020-12-15 DIAGNOSIS — M79.18 MYOFASCIAL PAIN: ICD-10-CM

## 2020-12-15 DIAGNOSIS — M54.50 CHRONIC BILATERAL LOW BACK PAIN WITHOUT SCIATICA: Primary | ICD-10-CM

## 2020-12-15 DIAGNOSIS — F43.10 POSTTRAUMATIC STRESS DISORDER: ICD-10-CM

## 2020-12-15 PROCEDURE — 90832 PSYTX W PT 30 MINUTES: CPT | Performed by: SOCIAL WORKER

## 2020-12-15 PROCEDURE — 99204 OFFICE O/P NEW MOD 45 MIN: CPT | Mod: TEL | Performed by: PSYCHIATRY & NEUROLOGY

## 2020-12-15 RX ORDER — METHOCARBAMOL 500 MG/1
500-1000 TABLET, FILM COATED ORAL 3 TIMES DAILY PRN
Qty: 60 TABLET | Refills: 0 | Status: SHIPPED | OUTPATIENT
Start: 2020-12-15 | End: 2020-12-29

## 2020-12-15 RX ORDER — SENNOSIDES 8.6 MG
650 CAPSULE ORAL EVERY 8 HOURS PRN
Qty: 120 TABLET | Refills: 1 | Status: SHIPPED | OUTPATIENT
Start: 2020-12-15 | End: 2021-10-07

## 2020-12-15 ASSESSMENT — PAIN SCALES - GENERAL: PAINLEVEL: SEVERE PAIN (7)

## 2020-12-15 NOTE — PROGRESS NOTES
"Nena Tang is a 60 year old female who is being evaluated via a billable telephone visit.      The patient has been notified of following:     \"This telephone visit will be conducted via a call between you and your physician/provider. We have found that certain health care needs can be provided without the need for a physical exam.  This service lets us provide the care you need with a short phone conversation.  If a prescription is necessary we can send it directly to your pharmacy.  If lab work is needed we can place an order for that and you can then stop by our lab to have the test done at a later time.    Telephone visits are billed at different rates depending on your insurance coverage. During this emergency period, for some insurers they may be billed the same as an in-person visit.  Please reach out to your insurance provider with any questions.    If during the course of the call the physician/provider feels a telephone visit is not appropriate, you will not be charged for this service.\"    Patient has given verbal consent for Telephone visit?  Yes    What phone number would you like to be contacted at? [phone]      How would you like to obtain your AVS? Mail a copy     Damaris Rodriguez, [facility] Consultation    Date of visit: 12/16/2020    Reason for consultation:    Nena Tang is a 60 year old female who is seen in consultation today at the request of her provider, Rowena Haas NP.    Primary Care Provider is Rowena Haas NP.  Pain medications are being prescribed by PCP.    Please see the [facility] health questionnaire which the patient completed and reviewed with me in detail.    Chief Complaint:    Chief Complaint   Patient presents with     Pain     PMH significant for HTN, DM2 A1C 9.1, CINDY on CPAP, MDD, GERD and low back pain.    Pain history:  Nena Tang is a 60 year old female who first " started having problems with pain about ~7-8 years ago. She recalls having to lift her late , who weighed 200lbs+ in and out of the tub. Which she thinks might have caused some of her pain. The pain is constant and achy, which is present in the low back and radiates to the sides. The pain does not travel below the belt line and just localized to the mid back. She does have some weakness to bilateral legs, which she had a fall about 6 months ago, however did not sustain any injuries. Pain increases with standing still, and she can only stand still for about 5-6 minutes. Pain is also increased with activity and improved with sitting and rest. She requires a walker for stability and to mobilize around the house. She has a PCA which assists her with bathing and medication assistance, and the patient is able perform most other activities of daily living. She states having pain with almost any activity that requires her to bend/twist and move around in general.  She denies any numbness, tingling or other paraesthesias. Otherwise, she remains to be in a good mood and would like to get some help with this pain.     Pain rating: intensity ranges from 7/10 to 8/10, and Averages 7/10 on a 0-10 scale.  Aggravating factors include: walking, lying down  Relieving factors include: Sitting, standing still  Any bowel or bladder incontinence: none. Has baseline urinary incontinence    Current pain medications include:  - Gabapentin 300 mg at bedtime - no benefit, causes some sedation so concerned about adding in daytime doses.    Previous medication treatments included:  - Tylenol 650 mg prn - no benefit  - Diclofenac gel - does not take   - Flexeril - no benefit    Other treatments have included:  Nena Tang has not been seen at a pain clinic in the past.    PT: yes, last session 6/2019 outpatient and most recent home PT a couple of months ago 3-4 sessions  Acupuncture: none  Chiropractor: none  TENs Unit:  none  Injections: shoulder injection does not remember when - no benefit    Past Medical History:  Past Medical History:   Diagnosis Date     Acute respiratory failure with hypoxia (H) 9/4/2017     CAD (coronary artery disease)     5/2014 cath, nonbostructive stenosis to LAD, RCA.     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/2011     Noncompliance with medication regimen 6/8/2011     Obesity      CINDY (obstructive sleep apnea) 3/8/2012    uses CPAP     Osteopenia 10/7/2009     Pneumonia of right lower lobe due to infectious organism 9/4/2017     Schizoaffective disorder, depressive type (H) 2/25/2013     Sepsis (H) 8/29/2017     Suicidal intent 10/2/2013     Takotsubo cardiomyopathy      Type 2 diabetes mellitus (H) 8/30/2011     Uterine cancer (H) 1983     Verbal auditory hallucination 10/4/2012     Patient Active Problem List    Diagnosis Date Noted     CAD (coronary artery disease) 02/29/2012     Priority: High      Tako-Tsubo stress cardiomyopathy 2009. Mild coronary artery disease,        Mixed incontinence 06/01/2020     Priority: Medium     Depression with suicidal ideation 12/21/2019     Priority: Medium     Pneumonia 07/28/2019     Priority: Medium     Hyperglycemia 05/15/2019     Priority: Medium     Positive AIDA (antinuclear antibody) 01/31/2019     Priority: Medium     Type 2 diabetes mellitus with stage 3 chronic kidney disease, with long-term current use of insulin (H) 06/05/2018     Priority: Medium     Cervical cancer screening 06/01/2018     Priority: Medium     Pt age 58  05/22/18: She had a total hysterectomy 35 years ago for uterine cancer, NIL pap, Neg HR HPV result. Plan  cease pap screening per provider.  No history of MEHREEN 2 or greater found in epic.   No further cervical cancer screening recommended.    updated.              Posttraumatic stress disorder 01/29/2018     Priority: Medium     Gastroenteritis 12/08/2017     Priority: Medium     Thoughts of self harm 10/23/2017     Priority: Medium     Infectious encephalopathy 09/04/2017     Priority: Medium     Non-intractable vomiting with nausea 09/04/2017     Priority: Medium     Abdominal pain, right lower quadrant 07/13/2017     Priority: Medium     Asymptomatic postmenopausal status 06/28/2017     Priority: Medium     Dysuria 06/12/2017     Priority: Medium     Psychophysiological insomnia 03/09/2017     Priority: Medium     History of uterine cancer 12/07/2016     Priority: Medium     Yearly pap's per the provider office visit note on 12/07/16.  05/22/18: She had a total hysterectomy 35 years ago for uterine cancer, NIL pap, Neg HR HPV result. Plan cease pap screening per provider.  11/1/19 NIL, Neg HPV. No further screening per provider.         Falls frequently 08/18/2016     Priority: Medium     Left cataract 07/25/2016     Priority: Medium     Alcohol use 04/19/2016     Priority: Medium     Tardive dyskinesia 09/11/2015     Priority: Medium     Mild nonproliferative diabetic retinopathy (H) 06/19/2014     Priority: Medium     Problem list name updated by automated process. Provider to review       Esophageal reflux 06/09/2014     Priority: Medium     Suicidal ideation 05/01/2014     Priority: Medium     Cocaine abuse, episodic (H) 10/03/2013     Priority: Medium     Lumbago 09/20/2013     Priority: Medium     Cervicalgia 09/20/2013     Priority: Medium     Health Care Home 08/16/2013     Priority: Medium     EMERGENCY CARE PLAN  Presenting Problem Signs and Symptoms Treatment Plan    Questions or concerns during clinic hours    I will call the clinic directly     Questions or concerns outside clinic hours    I will call  the 24 hour nurse line at [phone]    Patient needs to schedule an appointment    I will call the 24 hour scheduling team at [phone] or clinic directly    Same day treatment     I will call the clinic first, nurse line if after hours, urgent care and express care if needed     Primary Care Provider Dr. Honorio Di[facility] [phone]  Nurse Care Coordinator Idalmis Nettles [phone]        HTN, goal below 140/90 07/29/2013     Priority: Medium     Migraine headache 04/22/2013     Priority: Medium     Schizoaffective disorder, depressive type (H) 02/25/2013     Priority: Medium     Chronic low back pain 01/22/2013     Priority: Medium     Verbal auditory hallucination 10/04/2012     Priority: Medium     CINDY (obstructive sleep apnea)- mild AHI 10.3 03/08/2012     Priority: Medium     Sleep study 3/12 (198#)-   AHI 10.3, with significant desaturations down to 74%. RDI 10.3. Periodic Limb Movement Index 3.2/hour.         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 Echo 2010, angio 2011 with normal LVF.        Vitamin B12 deficiency without anemia 11/23/2009     Priority: Low     Diagnosis updated by automated process. Provider to review and confirm.       Osteopenia 10/07/2009     Priority: Low     Dexa 2009- Lumbar Spine (L1-L4): T-score -1.8, Left Femoral Neck: T-score -1.0, Right Femoral Neck: T-score -1.0              Past Surgical History:  Past Surgical History:   Procedure Laterality Date     C OOPHORECTORMY FOR RAHEL W/BX  1983    UTERINE      CATARACT IOL, RT/LT Bilateral 2017     COLONOSCOPY N/A 3/16/2017    Procedure: COLONOSCOPY;  Surgeon: Traci Gonzalez MD;  Location: [facility]     Coronary CTA  5/21/2014     HYSTERECTOMY  1983    uterine cancer yearly pap's per provider.     LAPAROSCOPIC CHOLECYSTECTOMY  2008     PHACOEMULSIFICATION CLEAR CORNEA WITH STANDARD INTRAOCULAR LENS IMPLANT Left 5/5/2017    Procedure: PHACOEMULSIFICATION CLEAR CORNEA WITH STANDARD INTRAOCULAR LENS IMPLANT;  LEFT EYE PHACOEMULSIFICATION CLEAR CORNEA WITH STANDARD INTRAOCULAR LENS IMPLANT ;  Surgeon: Tyra Diaz MD;  Location:  [facility]     PHACOEMULSIFICATION CLEAR CORNEA WITH STANDARD INTRAOCULAR LENS IMPLANT Right 6/30/2017    Procedure: PHACOEMULSIFICATION CLEAR CORNEA WITH STANDARD INTRAOCULAR LENS IMPLANT;  RIGHT EYE PHACOEMULSIFICATION CLEAR CORNEA WITH STANDARD INTRAOCULAR LENS IMPLANT;  Surgeon: Tyra Diaz MD;  Location:  [facility]     RELEASE TRIGGER FINGER  10/11/2012    Left thumb. Procedure: RELEASE TRIGGER FINGER;  LEFT THUMB TRIGGER RELEASE;  Surgeon: Tay Langley MD;  Location:  [facility]     RELEASE TRIGGER FINGER Right 12/26/2016    Procedure: RELEASE TRIGGER FINGER;  Surgeon: Albino Castañeda MD;  Location:  OR     Medications:  Current Outpatient Medications   Medication Sig Dispense Refill     acetaminophen (TYLENOL) 650 MG CR tablet Take 1 tablet (650 mg) by mouth every 8 hours as needed for mild pain or fever 120 tablet 1     alcohol swab prep pads Use to swab area of injection/rodolfo as directed. 100 each 3     amantadine (SYMMETREL) 100 MG capsule Take 1 capsule (100 mg) by mouth 2 times daily 60 capsule 3     aspirin (ASA) 81 MG EC tablet TAKE 1 TABLET (81MG) BY MOUTH DAILY 90 tablet 3     atorvastatin (LIPITOR) 80 MG tablet TAKE 1 TABLET (80MG) BY MOUTH DAILY 30 tablet 11     blood glucose (NO BRAND SPECIFIED) test strip Use to test blood sugar 4 times daily or as directed. 100 strip 11     clonazePAM (KLONOPIN) 0.5 MG tablet  Take 1 tablet (0.5 mg) by mouth At Bedtime 30 tablet 3     diclofenac (VOLTAREN) 1 % topical gel Place 4 g onto the skin 4 times daily as needed for moderate pain       famotidine (PEPCID) 20 MG tablet Take 1 tablet (20 mg) by mouth daily 30 tablet 3     fluticasone-salmeterol (ADVAIR) 250-50 MCG/DOSE inhaler Inhale 1 puff into the lungs every 12 hours as needed       gabapentin (NEURONTIN) 300 MG capsule Take 1 capsule (300 mg) by mouth At Bedtime 30 capsule 3     hydrOXYzine (VISTARIL) 25 MG capsule Take 1 capsule (25 mg) by mouth every 4 hours as needed for anxiety 60 capsule 3     insulin aspart (NOVOLOG FLEXPEN) 100 UNIT/ML pen Inject 24 units under the skin 3 times daily before meals as directed. Take an extra 10 units with sugary foods. 15 mL 1     insulin glargine (LANTUS PEN) 100 UNIT/ML pen Inject 60 Units Subcutaneous every morning       insulin pen needle (NOVOFINE 30) 30G X 8 MM miscellaneous USE 4 DAILY OR AS DIRECTED 400 each 0     ipratropium - albuterol 0.5 mg/2.5 mg/3 mL (DUONEB) 0.5-2.5 (3) MG/3ML neb solution Take 1 vial (3 mLs) by nebulization every 6 hours as needed for shortness of breath / dyspnea or wheezing 30 vial 0     losartan (COZAAR) 100 MG tablet Take 1 tablet (100 mg) by mouth daily 30 tablet 3     metoprolol succinate ER (TOPROL-XL) 25 MG 24 hr tablet Take 2 tablets (50 mg) by mouth daily 180 tablet 3     miconazole (MICATIN/MICRO GUARD) 2 % external powder Apply topically once as needed       nystatin (MYCOSTATIN) 745795 UNIT/GM external cream Apply topically 2 times daily 30 g 3     order for DME Equipment being ordered: Depends. 30 each 4     order for DME Equipment being ordered: CPAP Supplies. 1 each 0     paliperidone ER (INVEGA) 9 MG 24 hr tablet Take 1 tablet (9 mg) by mouth At Bedtime 60 tablet 1     senna-docusate (SENOKOT-S/PERICOLACE) 8.6-50 MG tablet Take 1 tablet by mouth 2 times daily as needed for constipation 60 tablet 10     sertraline (ZOLOFT) 100 MG tablet TAKE 2  TABLETS (200 MG) BY MOUTH DAILY 60 tablet 3     thin (NO BRAND SPECIFIED) lancets Use with lanceting device. To accompany: Blood Glucose Monitor Brands: per insurance. 100 each 6     traZODone (DESYREL) 100 MG tablet Take 1 tablet (100 mg) by mouth nightly as needed for sleep 30 tablet 3     TRULICITY 1.5 MG/0.5ML pen Inject 1.5 mg Subcutaneous once a week Every Friday 4 mL 3     zinc oxide (DESITIN) 40 % external ointment Apply topically as needed for dry skin or irritation 56 g 0     Allergies:     Allergies   Allergen Reactions     Imidazole Antifungals Hives     Tolerates diflucan     Ketoprofen Itching     Pruritis to topical     Lisinopril Hives     Metformin Other (See Comments)     Patient hospitalized for lactic acidosis - admitting provider suspectd caused by metformin     Metronidazole Hives     Posaconazole Hives     Tolerates diflucan     Social History:  Home situation: Lives with sister. On disability, worked as a  a long time ago. has a PCA that helps with ADLs for 6hrs a day, Mon-Frid.  Occupation: on disability  Tobacco use: denies  Alcohol use: denies  Drug use: remote h/o cocaine  History of chemical dependency treatment: none    Family history:  Family History   Problem Relation Age of Onset     Cancer Mother         BLADDER     Respiratory Mother         COPD     Gastrointestinal Disease Mother         CIRRHOSIS OF LI BOLIVAR     Alcohol/Drug Mother      Diabetes Mother      Hypertension Mother      Lipids Mother      C.A.D. Mother      Glaucoma Mother      Alcohol/Drug Sister      Mental Illness Sister      Alcohol/Drug Sister      Psychotic Disorder Sister      Cancer Maternal Grandmother         UNKNOWN TYPE     Cancer Brother         COLON     Cancer - colorectal Brother         IN HIS LATE 30S     Alcohol/Drug Brother          OF HEROIN OVERDOSE AT AGE 22 YRS     Macular Degeneration No family hx of        Review of Systems:    POSTIVE IN BOLD  GENERAL: fever/chills, fatigue,  general unwell feeling, weight gain/loss.  HEAD/EYES:  headache, dizziness, or vision changes.    EARS/NOSE/THROAT:  Nosebleeds, hearing loss, sinus infection, earache, tinnitus.  IMMUNE:  Allergies, cancer, immune deficiency, or infections.  SKIN:  Urticaria, rash, hives  HEME/Lymphatic:   anemia, easy bruising, easy bleeding.  RESPIRATORY:  cough, wheezing, or shortness of breath  CARDIOVASCULAR/Circulation:  Extremity edema, syncope, hypertension, tachycardia, or angina.  GASTROINTESTINAL:  abdominal pain, nausea/emesis, diarrhea, constipation,  hematochezia, or melena.  ENDOCRINE:  Diabetes, steroid use,  thyroid disease or osteoporosis.  MUSCULOSKELETAL: back pain, muscle pain  GENITOURINARY:  frequency, urgency, dysuria, difficulty voiding, hematuria or incontinence.  NEUROLOGIC:  weakness, numbness, paresthesias, seizure, tremor, stroke or memory loss.  PSYCHIATRIC:  depression, anxiety, stress, suicidal thoughts or mood swings.     Physical Exam:  There were no vitals filed for this visit.  Exam:  AAOx 3, NAD  Able to answer questions appropriately and in full sentences  Mood is euthymic    Musculoskeletal exam:  Deferred due to virtual visit  attempted to have her do lumbar range of motion exam, but she was unwilling due to pain    IMAGING:  Lumbar MRI 11/19/2020:  Findings: There are 5 lumbar-type vertebrae counting down from the C2.   The tip of the conus medullaris is at L1-2.  Normal lumbar vertebral  alignment.  There is no significant disc height narrowing at any  level.  Normal marrow signal. Multilevel mild facet arthropathy.     On a level by level basis:  T12-L1: No spinal canal or neuroforaminal stenosis.  L1-2: No spinal canal or neuroforaminal stenosis.  L2-3: No spinal canal or neuroforaminal stenosis.  L3-4: No spinal canal or neuroforaminal stenosis.  L4-5: No spinal canal or neuroforaminal stenosis.  L5-S1: Posterior disc bulge. No spinal canal or neuroforaminal stenosis.     Paraspinous  tissues are within normal limits. Mild, normal variant,  dependent edema in the subcutaneous dorsal soft tissues.                                                                   Impression: Minimal degenerative change at L5-S1 without spinal canal  stenosis or neural foraminal narrowing at any level.       Other testing (labs, diagnostics) reviewed:    EKG 10/4/2020: NSR, QTc 430    [place] Prescription Monitoring Program reviewed yes 12/14/2020, no opioids prescribed    Outside records reviewed      Screening tools:  DIRE Score for ongoing opioid management is calculated as follows:    Diagnosis = 2    Intractability = 1    Risk: Psych = 2  Chem Hlth = 2  Reliability = 3  Social = 3    Efficacy = 13    Total DIRE Score =  (14 or higher predicts good candidate for ongoing opioid management; 13 or lower predicts poor candidate for opioid management)     Assessment:  1. Myofascial pain: The patient has a long history of low back pain and decreased mobility. She has participated with PT in the past, however has significant pain with their exercise regimen. She likely has significant muscle tightness and her deconditioned states has aggravated her pain and now has a central sensitization component as well. Recent MRI imaging of her lumbar back, is essentially unremarkable and only shows minimal degenerative changes. At this point it is hard to say whether there is an axial component to her pain and we would need to perform a full exam before recommending an injection, if any. Opioids medication would not be recommended for myofascial pain, however she would benefit from some muscle relaxants, as current membrane stabilizer has not given her much relief and she is unable to tolerate it's side effects.     Nena Tang is a 60 year old female who presents with the complaints of Low Back Pain.    Plan:  Diagnosis reviewed, treatment option addressed, and risk/benefits discussed.  Self-care instructions given.  I am  recommending a multidisciplinary treatment plan to help this patient better manage her pain.      1. Physical Therapy: none at this time, however she would benefit from ongoing mobility and suggest at least some HEP. Given inability to tolerate even home PT, will try some other treatments to start.   2. Pain Psychologist to address issues of relaxation, behavioral change, coping style, and other factors important to improvement: none   3. Diagnostic Studies: none  4. Urine toxicology screen: none   5. Medication Management:   1. Will prescribe methocarbamol, instructions given to patient. Will assess it's efficacy in a week during her follow-up appointment.   2. I would not recommend opioids  3. Further titration of gabapentin may be helpful, but she is concerned about safety.  Lyrica is an option, but can cause LE swelling, which she already deals with.    6. Further procedures recommended: none  7. Other treatments: none  8. Recommendations/follow-up for PCP:  None, will re-evaluate the patient in person  9. Follow up: 1 week, scheduled at 11:30am on 12/22 to examine.    Phone call duration: 28 minutes      MD Armando Friend MD, Pain Fellow

## 2020-12-15 NOTE — PROGRESS NOTES
[facility] - Integrated [facility] Care   December 15, 2020    Behavioral Health Clinician Progress Note    Patient Name: Nena Tang           Service Type:  Individual      Service Location:   Face to Face in Clinic     Session Start Time: 3:30 pm  Session End Time:  4pm      Session Length: 16 - 37      Attendees: Patient    Visit Activities (Refresh list every visit): [facility] Only     Diagnostic Assessment Date: 1-23-18 Updated DA completed December 12, 2019  Treatment Plan Review Date: 2-  CGI date completed: 11-    See Flowsheets for today's PHQ-9 and TAMIA-7 results  Previous PHQ-9:   PHQ-9 SCORE 10/26/2018 5/7/2019 12/1/2020   PHQ-9 Total Score - - -   PHQ-9 Total Score - - -   PHQ-9 Total Score 20 22 13     Previous TAMIA-7:   TAMIA-7 SCORE 2/3/2017 3/13/2018 10/26/2018   Total Score - - -   Total Score 0 17 19   Total Score - - -       ALEXANDRA LEVEL:  ALEXANDRA Score (Last Two) 8/30/2011 6/9/2014   ALEXANDRA Raw Score 42 37   Activation Score 66 49.9   ALEXANDRA Level 3 2       DATA  Extended Session (60+ minutes): No  Interactive Complexity: No  Crisis: No  [facility] Patient: No    Treatment Objective(s) Addressed in This Session:  Target Behavior(s): disease management/lifestyle changes mental health    Depressed Mood: Increase interest, engagement, and pleasure in doing things  Decrease frequency and intensity of feeling down, depressed, hopeless  Improve quantity and quality of night time sleep / decrease daytime naps  Identify negative self-talk and behaviors: challenge core beliefs, myths, and actions  Improve concentration, focus, and mindfulness in daily activities   Anxiety: will experience a reduction in anxiety and will develop more effective coping skills to manage anxiety symptoms  Thought Disturbance: will develop skills to more effectively manage symptoms and will continue to take medications as prescribed  PTSD Symptom Management    Current Stressors / Issues:    The patient talked about this being a hard  time of the year-she is missing her  and her mother-so she has made it up in her mind that holidays are are not good time-she is trying to be happy to level things off-she stated that her sister is trying to get her in to the holiday with decorating the home and she has some presents under the tree-and her other family are trying to help her be present-2 doors and when the one door knocks and pulls at her emotions and she can move her attention to the other door of being present-she is not have suicidal thoughts and she does not have these thoughts-she has just been feeling sad-she wants to schedule the time to grieve her mother and  the day before [place] [place]-she feels that she is getting stronger-regarding sleeping the client reported that she could do a little bit better-she needs to get the CPAP machine because she needs to get it cleaned-no hallucinations-          Progress on Treatment Objective(s) / Homework:  Minimal progress - ACTION (Actively working towards change); Intervened by reinforcing change plan / affirming steps taken    Motivational Interviewing    MI Intervention: Expressed Empathy/Understanding, Supported Autonomy, Collaboration, Evocation, Permission to raise concern or advise, Open-ended questions, Reflections: simple and complex, Rolled with resistance: Emphasized patient autonomy, Simple reflection and Evoked patient agenda and Change talk (evoked)     Change Talk Expressed by the Patient: Desire to change Ability to change Reasons to change Need to change Committment to change Activation Taking steps    Provider Response to Change Talk: E - Evoked more info from patient about behavior change, A - Affirmed patient's thoughts, decisions, or attempts at behavior change, R - Reflected patient's change talk and S - Summarized patient's change talk statements      Care Plan review completed: No    Medication Review:  No changes to current psychiatric medication(s)     Medication  Compliance:  Yes    Changes in Health Issues:   Yes: please see medical note by PCP     Chemical Use Review:   Substance Use: Chemical use reviewed, no active concerns identified      Tobacco Use: No current tobacco use.      Assessment: Current Emotional / Mental Status (status of significant symptoms):  Risk status (Self / Other harm or suicidal ideation)  Patient has had a history of suicidal ideation: yes  Patient denies current fears or concerns for personal safety.  Patient denies current or recent suicidal ideation or behaviors.  Patient denies current or recent homicidal ideation or behaviors.  Patient denies current or recent self injurious behavior or ideation.  Patient denies other safety concerns.  A safety and risk management plan has not been developed at this time, however patient was encouraged to call [place] [phone] should there be a change in any of these risk factors.    Appearance:   Appropriate   Eye Contact:   Good   Psychomotor Behavior: Retarded (Slowed)   Attitude:   Cooperative   Orientation:   All  Speech   Rate / Production: Normal    Volume:  Normal   Mood:    Anxious  Normal  Affect:    Appropriate   Thought Content:  Clear   Thought Form:  Coherent  Goal Directed  Logical   Insight:    Fair     Diagnoses:  1. Schizoaffective disorder, bipolar type (H)    2. Posttraumatic stress disorder        Collateral Reports Completed:  Not Applicable    Plan: (Homework, other):  Patient was given information about behavioral services and encouraged to schedule a follow up appointment with the clinic [facility] as needed to work on helping the patient with management of mood states and to work on stress management around her grieving process-also to help the patient with her behavioral needs to comply with treatment medical recommendations.  She was also given information about mental health symptoms and treatment options .  CD Recommendations: Maintain Sobriety.    Richardson Lopez [facility], [facility]                                                     Treatment Plan    Client's Name: Nena Tang  YOB: 1960    Date: Reviewed/updated on 11/17/2020    DSM5 Diagnoses: (Sustained by DSM5 Criteria Listed Above)  Diagnoses:  295.70 (F25.0), Schizoaffective disorder, depressive type; 309.81 (F43.10) Posttraumatic Stress Disorder with panic specifier, history of chemical dependency issues (polysubstance dependence)  Psychosocial & Contextual Factors: Academics / Education - yes  Activities of Daily Living - yes  Financial management yes  Follow through with Medical recommendations - yes  Occupational / Vocational - yes  Social / Relational - yes, grief and loss  WHODAS Score: 30      Referral / Collaboration:  Referral to another professional/service is not indicated at this time..    Anticipated number of session or this episode of care: 20      MeasurableTreatment Goal(s) related to diagnosis / functional impairment(s)  Goal 1: Client will improve her ability to manage anxiety and mood states.     I will know I've met my goal when the man does not paralyze the me with fear.     Objective #A (Client Action)    Client will increase understanding of steps in the grief process.  Status: Continued - Date(s):  11/17/2020 the patient reported that she has been dealing with her grief in a good way-she has been spending time with her family and has some activities that she enjoys.    Intervention(s)  Therapist will teach emotional recognition/identification. teach the navigation of grieving encouraging acceptance and adjustment.    Objective #B  Client will Decrease frequency and intensity of feeling down, depressed, hopeless  Decrease thoughts that you'd be better off dead or of suicide / self-harm.  Status: Continued - Date(s): 11/17/2020 the patient has been more active spending time with her son-she has been sleeping good-she stated that she has been sleeping late-no thoughts of suicide-she has been doing a lot of  walking-she has improved her ability manage her health issues.    Intervention(s)  Therapist will teach emotional regulation skills. with the use of mindful coping strategies and open communication of feelings and thoughts (getting it out to another person)    Client has reviewed and agreed to the above plan.      Richardson Lopez, [facility]  11/17/2020        ______________________________________________________________________

## 2020-12-15 NOTE — PATIENT INSTRUCTIONS
Please come in on 12/22 for an exam at 11:30am.  Wear a mask.    ----------------------------------------------------------------  [facility] Number:  [phone]     Call with any questions about your care and for scheduling assistance.     Calls are returned Monday through Friday between 8 AM and 4:30 PM. We usually get back to you within 2 business days depending on the issue/request.    If we are prescribing your medications:    For opioid medication refills, call the clinic or send a [employer] message 7 days in advance.  Please include:    Name of requested medication    Name of the pharmacy.    For non-opioid medications, call your pharmacy directly to request a refill. Please allow 3-4 days to be processed.     Per [place] State Law:    All controlled substance prescriptions must be filled within 30 days of being written.      For those controlled substances allowing refills, pickup must occur within 30 days of last fill.      We believe regular attendance is key to your success in our program!      Any time you are unable to keep your appointment we ask that you call us at least 24 hours in advance to cancel.This will allow us to offer the appointment time to another patient.     Multiple missed appointments may lead to dismissal from the clinic.

## 2020-12-16 NOTE — TELEPHONE ENCOUNTER
"Requested Prescriptions   Pending Prescriptions Disp Refills     acetaminophen (TYLENOL) 650 MG CR tablet 120 tablet 1     Sig: Take 1 tablet (650 mg) by mouth every 8 hours as needed for mild pain or fever       Analgesics (Non-Narcotic Tylenol and ASA Only) Passed - 12/14/2020  2:41 PM        Passed - Recent (12 mo) or future (30 days) visit within the authorizing provider's specialty     Patient has had an office visit with the authorizing provider or a provider within the authorizing providers department within the previous 12 mos or has a future within next 30 days. See \"Patient Info\" tab in inbasket, or \"Choose Columns\" in Meds & Orders section of the refill encounter.              Passed - Patient is 7 months old or older     If patient is a peds patient of the age 7 mos -12 years, ok to refill using weight-based dosing.     If >3g daily and/or sig is not \"prn\", check for liver enzymes. If normal in the last year, ok to refill.  If not, refer to the provider.          Passed - Medication is active on med list           Signed Prescriptions:                        Disp   Refills    acetaminophen (TYLENOL) 650 MG CR tablet   120 ta*1        Sig: Take 1 tablet (650 mg) by mouth every 8 hours as           needed for mild pain or fever  Authorizing Provider: MICHAELLE CALIXTO  Ordering User: PATI GARCIA      "

## 2020-12-21 ENCOUNTER — TELEPHONE (OUTPATIENT)
Dept: PALLIATIVE MEDICINE | Facility: CLINIC | Age: 60
End: 2020-12-21

## 2020-12-21 NOTE — TELEPHONE ENCOUNTER
PA Initiation    Medication: methocarbamol (ROBAXIN) 500 MG tablet  Insurance Company: [employer] - Phone [phone] Fax [phone]  Pharmacy Filling the Rx: [place], [place] - [address] [place] [address]  Filling Pharmacy Phone: [phone]  Filling Pharmacy Fax: [phone]  Start Date: 12/21/2020

## 2020-12-21 NOTE — PROGRESS NOTES
[facility]    Date of visit: 12/22/2020    Chief complaint:   Chief Complaint   Patient presents with     Pain     Telephone visit due to Covid-19      Interval history:  Nena Tang was last seen by me on 12/15/2020.      Recommendations/plan at the last visit included:  1. Physical Therapy: none at this time, however she would benefit from ongoing mobility and suggest at least some HEP. Given inability to tolerate even home PT, will try some other treatments to start.   2. Pain Psychologist to address issues of relaxation, behavioral change, coping style, and other factors important to improvement: none   3. Diagnostic Studies: none  4. Urine toxicology screen: none   5. Medication Management:   1. Will prescribe methocarbamol, instructions given to patient. Will assess it's efficacy in a week during her follow-up appointment.   2. I would not recommend opioids  3. Further titration of gabapentin may be helpful, but she is concerned about safety.  Lyrica is an option, but can cause LE swelling, which she already deals with.    6. Further procedures recommended: none  7. Other treatments: none  8. Recommendations/follow-up for PCP:  None, will re-evaluate the patient in person  9. Follow up: 1 week, scheduled at 11:30am on 12/22 to examine.      Since her last visit, Nena Tang reports:  - Robaxin (methocarbamol) was approved today  - Describes back and legs being equal in pain.  - Leg pain similar bilaterally  - Mostly with same c/o generalized weakness and pain    Pain scores:  Pain intensity on average is 7 on a scale of 0-10.     Current pain medications include:  - Gabapentin 300 mg at bedtime -  sedation so concerned about adding in daytime doses.    Previous medication treatments included:  - Tylenol 650 mg prn - no benefit  - Diclofenac gel - does not take   - Flexeril - no benefit    Other treatments have included:  Nena Tang has not been seen at a  pain clinic in the past.    PT: yes, last session 6/2019 outpatient and most recent home PT a couple of months ago 3-4 sessions  Acupuncture: none  Chiropractor: none  TENs Unit: none  Injections: shoulder injection does not remember when - no benefit    Side Effects: sedation    Medications:  Current Outpatient Medications   Medication Sig Dispense Refill     acetaminophen (TYLENOL) 650 MG CR tablet Take 1 tablet (650 mg) by mouth every 8 hours as needed for mild pain or fever 120 tablet 1     alcohol swab prep pads Use to swab area of injection/rodolfo as directed. 100 each 3     amantadine (SYMMETREL) 100 MG capsule Take 1 capsule (100 mg) by mouth 2 times daily 60 capsule 3     aspirin (ASA) 81 MG EC tablet TAKE 1 TABLET (81MG) BY MOUTH DAILY (Patient not taking: Reported on 12/15/2020) 90 tablet 3     atorvastatin (LIPITOR) 80 MG tablet TAKE 1 TABLET (80MG) BY MOUTH DAILY 30 tablet 11     blood glucose (NO BRAND SPECIFIED) test strip Use to test blood sugar 4 times daily or as directed. 100 strip 11     clonazePAM (KLONOPIN) 0.5 MG tablet Take 1 tablet (0.5 mg) by mouth At Bedtime 30 tablet 3     diclofenac (VOLTAREN) 1 % topical gel Place 4 g onto the skin 4 times daily as needed for moderate pain       famotidine (PEPCID) 20 MG tablet Take 1 tablet (20 mg) by mouth daily 30 tablet 3     fluticasone-salmeterol (ADVAIR) 250-50 MCG/DOSE inhaler Inhale 1 puff into the lungs every 12 hours as needed       gabapentin (NEURONTIN) 300 MG capsule Take 1 capsule (300 mg) by mouth At Bedtime 30 capsule 3     hydrOXYzine (VISTARIL) 25 MG capsule Take 1 capsule (25 mg) by mouth every 4 hours as needed for anxiety 60 capsule 3     insulin aspart (NOVOLOG FLEXPEN) 100 UNIT/ML pen Inject 24 units under the skin 3 times daily before meals as directed. Take an extra 10 units with sugary foods. 15 mL 1     insulin glargine (LANTUS PEN) 100 UNIT/ML pen Inject 60 Units Subcutaneous every morning       insulin pen needle (NOVOFINE  30) 30G X 8 MM miscellaneous USE 4 DAILY OR AS DIRECTED 400 each 0     ipratropium - albuterol 0.5 mg/2.5 mg/3 mL (DUONEB) 0.5-2.5 (3) MG/3ML neb solution Take 1 vial (3 mLs) by nebulization every 6 hours as needed for shortness of breath / dyspnea or wheezing 30 vial 0     losartan (COZAAR) 100 MG tablet Take 1 tablet (100 mg) by mouth daily 30 tablet 3     methocarbamol (ROBAXIN) 500 MG tablet Take 1-2 tablets (500-1,000 mg) by mouth 3 times daily as needed for muscle spasms . Caution for sedation 60 tablet 0     metoprolol succinate ER (TOPROL-XL) 25 MG 24 hr tablet Take 2 tablets (50 mg) by mouth daily 180 tablet 3     miconazole (MICATIN/MICRO GUARD) 2 % external powder Apply topically once as needed       nystatin (MYCOSTATIN) 264740 UNIT/GM external cream Apply topically 2 times daily 30 g 3     order for DME Equipment being ordered: Depends. 30 each 4     order for DME Equipment being ordered: CPAP Supplies. 1 each 0     paliperidone ER (INVEGA) 9 MG 24 hr tablet Take 1 tablet (9 mg) by mouth At Bedtime 60 tablet 1     senna-docusate (SENOKOT-S/PERICOLACE) 8.6-50 MG tablet Take 1 tablet by mouth 2 times daily as needed for constipation 60 tablet 10     sertraline (ZOLOFT) 100 MG tablet TAKE 2 TABLETS (200 MG) BY MOUTH DAILY 60 tablet 3     thin (NO BRAND SPECIFIED) lancets Use with lanceting device. To accompany: Blood Glucose Monitor Brands: per insurance. 100 each 6     traZODone (DESYREL) 100 MG tablet Take 1 tablet (100 mg) by mouth nightly as needed for sleep 30 tablet 3     TRULICITY 1.5 MG/0.5ML pen Inject 1.5 mg Subcutaneous once a week Every Friday 4 mL 3     zinc oxide (DESITIN) 40 % external ointment Apply topically as needed for dry skin or irritation 56 g 0       Medical History: any changes in medical history since they were last seen? No    Review of Systems:  The 14 system ROS was reviewed from the intake questionnaire, and is positive for: generalized weakness, back pain, leg pain  Any bowel  or bladder problems: no  Mood: normal    Physical Exam:  Last menstrual period 01/06/2015, not currently breastfeeding.  Exam:  AAOx 3, NAD  Able to answer questions appropriately and in full sentences  Mood is euthymic  Slow sit to stand and movement.  Difficulty getting up to bed and laying back.  UE strength 5/5  Sensation UE - normal to Armando Fu MD  Hip flexion weakness bilaterally 4/5  Knee extension left 4/5, right 5/5  Dorsiflexion 5/5  Plantarflexion- 5/5  Reflexes:  Patellar, achilles: 1+ bilaterally  Pain in the back with staight leg, no leg. Limitations of range of motion   SHANON-  Positive bilaterally, buttock pain  Side thrust positive bilaterally  Wide based gait, limited arm swing.  Typically uses walker.  bilateral lumbar paraspinal tenderness   Quadratus lumborum tenderness, milder  Bilateral SI joint tenderness   Bilateral trochanteric bursa tenderness R, nontender on left   Extension/rotation bilaterally tenderness   17/18 tender point    Assessment:   1.  Widespread pain, she does meet 2010 fibromyalgia diagnostic criteria. Patient continues to endorse generalized tenderness, soreness and pain to low back, upper and lower extremities and shoulders. Currently on gabapentin, however unable to tolerate sedation SE and therefore only taking it at night. Would benefit from something less sedating.  2. Chronic low back pain- difficult to assess single underlying cause (MRI looks good) given widespread pain- likely SI joint or facet pain if spine source  3. Deconditioning      Plan:  1. Physical Therapy: order placed  2. Pain Psychologist to address issues of relaxation, behavioral change, coping style, and other factors important to improvement: patient agreeable to try to participate with pain psych.- order placed  3. Diagnostic Studies: none  4. Urine toxicology screen: none   5. Medication Management:   1. Further titration of gabapentin may be helpful, but she is concerned about safety.  Lyrica  is an option, but can cause LE swelling, which she already deals with.    2. Will prescribe Topamax, instructions given. Recommended to first start Methocarbamol, as this medication has been approved and them may add Topamax, once tolerating new medication  3. I would not recommend opioids for widespread pain  4. Robaxin (methocarbamol) approved. She will start that first  6. Further procedures recommended: Possible b/l SI joint, once widespread pain is better controlled  7. Other treatments: none  8. Recommendations/follow-up for PCP:  none  9. Follow up: 4-5 weeks following pain psych and pain PT follow-ups    Total time spent was 35 minutes, and more than 50% of face to face time was spent in counseling and/or coordination of care regarding medications.     Armando Ansari MD, Pain Fellow

## 2020-12-21 NOTE — TELEPHONE ENCOUNTER
Prior Authorization Retail Medication Request    Medication/Dose: methocarbamol (ROBAXIN) 500 MG tablet  ICD code (if different than what is on RX):    Previously Tried and Failed:    Rationale:      Insurance Name:  [employer]  Insurance ID:  [id number]       Pharmacy Information (if different than what is on RX)    [facility] - [place] - [address] [address]  [address] [address]  [place] [address]  Phone: [phone] Fax: [phone]    Will route to PA Team Jasiel Anaya [facility]  [place]

## 2020-12-21 NOTE — TELEPHONE ENCOUNTER
Prior Authorization Approval    Authorization Effective Date: 11/21/2020  Authorization Expiration Date: 12/21/2021  Medication: methocarbamol (ROBAXIN) 500 MG tablet  Approved Dose/Quantity:   Reference #: [id number]    Insurance Company: [employer] - Phone [phone] Fax [phone]  Expected CoPay:       CoPay Card Available:      Foundation Assistance Needed:    Which Pharmacy is filling the prescription (Not needed for infusion/clinic administered): [place], [place] - [address] [place] [address]  Pharmacy Notified: Yes  Patient Notified: Yes, **Instructed pharmacy to notify patient when script is ready to /ship.**

## 2020-12-22 ENCOUNTER — VIRTUAL VISIT (OUTPATIENT)
Dept: PALLIATIVE MEDICINE | Facility: CLINIC | Age: 60
End: 2020-12-22
Payer: COMMERCIAL

## 2020-12-22 VITALS — OXYGEN SATURATION: 97 % | DIASTOLIC BLOOD PRESSURE: 87 MMHG | HEART RATE: 77 BPM | SYSTOLIC BLOOD PRESSURE: 152 MMHG

## 2020-12-22 DIAGNOSIS — G89.4 CHRONIC PAIN SYNDROME: ICD-10-CM

## 2020-12-22 DIAGNOSIS — M79.7 FIBROMYALGIA: Primary | ICD-10-CM

## 2020-12-22 PROCEDURE — 99214 OFFICE O/P EST MOD 30 MIN: CPT | Performed by: PSYCHIATRY & NEUROLOGY

## 2020-12-22 RX ORDER — TOPIRAMATE 25 MG/1
TABLET, FILM COATED ORAL
Qty: 120 TABLET | Refills: 1 | Status: SHIPPED | OUTPATIENT
Start: 2020-12-22 | End: 2021-02-02

## 2020-12-22 ASSESSMENT — PAIN SCALES - GENERAL: PAINLEVEL: SEVERE PAIN (7)

## 2020-12-22 NOTE — PATIENT INSTRUCTIONS
1. Schedule pain PT- at the desk or call [phone]    2. Schedule pain psychology- at the desk or call [phone]    3. Schedule virtual follow up in 4-5 weeks.    4. Start the Robaxin (methocarbamol) as prescribed.  See how you tolerate.  5. If no issues, add in topamax, using the schedule below.  Topamax:  1 tab= 25mg    AM   PM  0   25mg (1 tab).  If tolerating, after 1 week go to the next line.  25mg (1 tab)  25mg (1 tab).  If tolerating, after 1 week go to the next line.  25mg (1 tab)  50mg (2 tabs).  If tolerating, after 1 week go to the next line.  50mg (2 tabs)  50mg (2 tabs). Call us when you are at this dose or with any concerns    -remain well hydrated, due to risk of kidney stones  -do not drive until you know how it affects you  -new numbness or tingling in the toes may be related to how well hydrated you are- if this occurs- drink more fluids and it should get better    ----------------------------------------------------------------  Clinic Number:  [phone]     Call with any questions about your care and for scheduling assistance.     Calls are returned Monday through Friday between 8 AM and 4:30 PM. We usually get back to you within 2 business days depending on the issue/request.    If we are prescribing your medications:    For opioid medication refills, call the clinic or send a "[employer] message 7 days in advance.  Please include:    Name of requested medication    Name of the pharmacy.    For non-opioid medications, call your pharmacy directly to request a refill. Please allow 3-4 days to be processed.     Per [place] State Law:    All controlled substance prescriptions must be filled within 30 days of being written.      For those controlled substances allowing refills, pickup must occur within 30 days of last fill.      We believe regular attendance is key to your success in our program!      Any time you are unable to keep your appointment we ask that you call us at least 24 hours in  advance to cancel.This will allow us to offer the appointment time to another patient.     Multiple missed appointments may lead to dismissal from the clinic.

## 2020-12-22 NOTE — TELEPHONE ENCOUNTER
Prior Authorization Approval     Authorization Effective Date: 11/21/2020  Authorization Expiration Date: 12/21/2021  Medication: methocarbamol (ROBAXIN) 500 MG tablet    Routed to Dr. Paz for FYI. Closing encounter...    Patient Notified: Yes

## 2020-12-23 ENCOUNTER — TELEPHONE (OUTPATIENT)
Dept: BEHAVIORAL HEALTH | Facility: CLINIC | Age: 60
End: 2020-12-23

## 2020-12-23 ENCOUNTER — TELEPHONE (OUTPATIENT)
Dept: FAMILY MEDICINE | Facility: CLINIC | Age: 60
End: 2020-12-23

## 2020-12-23 DIAGNOSIS — Z79.4 TYPE 2 DIABETES MELLITUS WITH HYPERGLYCEMIA, WITH LONG-TERM CURRENT USE OF INSULIN (H): ICD-10-CM

## 2020-12-23 DIAGNOSIS — E11.65 TYPE 2 DIABETES MELLITUS WITH HYPERGLYCEMIA, WITH LONG-TERM CURRENT USE OF INSULIN (H): ICD-10-CM

## 2020-12-23 RX ORDER — INSULIN ASPART 100 [IU]/ML
INJECTION, SOLUTION INTRAVENOUS; SUBCUTANEOUS
Qty: 15 ML | Refills: 1 | Status: CANCELLED | OUTPATIENT
Start: 2020-12-23

## 2020-12-23 RX ORDER — INSULIN ASPART 100 [IU]/ML
24 INJECTION, SOLUTION INTRAVENOUS; SUBCUTANEOUS
Qty: 15 ML | Refills: 1 | Status: SHIPPED | OUTPATIENT
Start: 2020-12-23 | End: 2021-03-02

## 2020-12-23 NOTE — TELEPHONE ENCOUNTER
Prior Authorization Retail Medication Request    Medication/Dose: insulin aspart (NOVOLOG FLEXPEN) 100 UNIT/ML pen  ICD code (if different than what is on RX):    Previously Tried and Failed:    Rationale:      [facility]  Insurance ID:  [id number]      Pharmacy Information (if different than what is on RX)  Name:  [facility] pharmacy   Phone:  [phone]

## 2020-12-23 NOTE — TELEPHONE ENCOUNTER
Forwarding insulin refill request to PCP.    Mera from [facility] calls requesting Rx for brand Novolog insulin, states insurance will only cover brand name.  RN pended Rx with note to pharmacy to dispense brand Novolog.  Appears previously was generic.     Mallory Jiménez RN  [facility]

## 2020-12-24 NOTE — TELEPHONE ENCOUNTER
Prior Authorization Approval    Authorization Effective Date: 11/24/2020  Authorization Expiration Date: 12/24/2021  Medication: insulin aspart (NOVOLOG FLEXPEN) 100 UNIT/ML pen-APPROVED  Approved Dose/Quantity:   Reference #:     Insurance Company: [employer] - Phone [phone] Fax [phone]  Expected CoPay:       CoPay Card Available:      Foundation Assistance Needed:    Which Pharmacy is filling the prescription (Not needed for infusion/clinic administered): [place], [place] - [address] [place] [address]  Pharmacy Notified: Yes  Patient Notified: No    Pharmacy will notify patient when medication is ready.

## 2020-12-24 NOTE — TELEPHONE ENCOUNTER
Central Prior Authorization Team   Phone: [phone]      PA Initiation    Medication: insulin aspart (NOVOLOG FLEXPEN) 100 UNIT/ML pen-Initiated  Insurance Company: [employer] - Phone [phone] Fax [phone]  Pharmacy Filling the Rx: [place], [place] - [address] [place] [address] [place] [address]  Filling Pharmacy Phone: [phone]  Filling Pharmacy Fax:    Start Date: 12/24/2020

## 2020-12-28 ENCOUNTER — MEDICAL CORRESPONDENCE (OUTPATIENT)
Dept: HEALTH INFORMATION MANAGEMENT | Facility: CLINIC | Age: 60
End: 2020-12-28

## 2020-12-29 DIAGNOSIS — M79.18 MYOFASCIAL PAIN: ICD-10-CM

## 2020-12-29 RX ORDER — METHOCARBAMOL 500 MG/1
500-1000 TABLET, FILM COATED ORAL 3 TIMES DAILY PRN
Qty: 180 TABLET | Refills: 1 | Status: SHIPPED | OUTPATIENT
Start: 2020-12-29 | End: 2021-02-02

## 2020-12-29 NOTE — TELEPHONE ENCOUNTER
Received fax request from     [facility] - [place], [place] - [address] [facility] [address]  [address] [facility] [address]  [place] [address]  Phone: [phone] Fax: [phone]     pharmacy requesting refill(s) for methocarbamol (ROBAXIN) 500 MG tablet    Last refilled on 12/22/20    Pt last seen on 12/22/20  Next appt scheduled for 01/26/21    Will facilitate refill.    Vivi Anaya [facility]  [place]

## 2020-12-29 NOTE — TELEPHONE ENCOUNTER
Signed Prescriptions:                        Disp   Refills    methocarbamol (ROBAXIN) 500 MG tablet      180 ta*1        Sig: Take 1-2 tablets (500-1,000 mg) by mouth 3 times           daily as needed for muscle spasms . Caution for           sedation  Authorizing Provider: CODEY VACA MD  [facility]

## 2021-01-01 ENCOUNTER — MEDICAL CORRESPONDENCE (OUTPATIENT)
Dept: HEALTH INFORMATION MANAGEMENT | Facility: CLINIC | Age: 61
End: 2021-01-01

## 2021-01-05 ENCOUNTER — OFFICE VISIT (OUTPATIENT)
Dept: PHARMACY | Facility: CLINIC | Age: 61
End: 2021-01-05
Payer: COMMERCIAL

## 2021-01-05 ENCOUNTER — OFFICE VISIT (OUTPATIENT)
Dept: FAMILY MEDICINE | Facility: CLINIC | Age: 61
End: 2021-01-05
Payer: COMMERCIAL

## 2021-01-05 ENCOUNTER — OFFICE VISIT (OUTPATIENT)
Dept: BEHAVIORAL HEALTH | Facility: CLINIC | Age: 61
End: 2021-01-05
Payer: COMMERCIAL

## 2021-01-05 VITALS
BODY MASS INDEX: 43.55 KG/M2 | DIASTOLIC BLOOD PRESSURE: 74 MMHG | DIASTOLIC BLOOD PRESSURE: 74 MMHG | SYSTOLIC BLOOD PRESSURE: 132 MMHG | HEART RATE: 77 BPM | OXYGEN SATURATION: 96 % | BODY MASS INDEX: 43.55 KG/M2 | HEART RATE: 77 BPM | WEIGHT: 223 LBS | WEIGHT: 223 LBS | TEMPERATURE: 97.9 F | OXYGEN SATURATION: 96 % | TEMPERATURE: 97.9 F | SYSTOLIC BLOOD PRESSURE: 132 MMHG

## 2021-01-05 DIAGNOSIS — N39.46 MIXED INCONTINENCE: ICD-10-CM

## 2021-01-05 DIAGNOSIS — M62.81 GENERALIZED MUSCLE WEAKNESS: ICD-10-CM

## 2021-01-05 DIAGNOSIS — M79.7 FIBROMYALGIA: ICD-10-CM

## 2021-01-05 DIAGNOSIS — M54.50 CHRONIC RIGHT-SIDED LOW BACK PAIN WITHOUT SCIATICA: ICD-10-CM

## 2021-01-05 DIAGNOSIS — B37.31 YEAST INFECTION OF THE VAGINA: Primary | ICD-10-CM

## 2021-01-05 DIAGNOSIS — F25.1 SCHIZOAFFECTIVE DISORDER, DEPRESSIVE TYPE (H): ICD-10-CM

## 2021-01-05 DIAGNOSIS — G89.29 CHRONIC RIGHT-SIDED LOW BACK PAIN WITHOUT SCIATICA: ICD-10-CM

## 2021-01-05 DIAGNOSIS — E11.3299 TYPE 2 DIABETES MELLITUS WITH MILD NONPROLIFERATIVE RETINOPATHY WITHOUT MACULAR EDEMA, WITH LONG-TERM CURRENT USE OF INSULIN, UNSPECIFIED LATERALITY (H): Primary | ICD-10-CM

## 2021-01-05 DIAGNOSIS — Z79.4 TYPE 2 DIABETES MELLITUS WITH MILD NONPROLIFERATIVE RETINOPATHY WITHOUT MACULAR EDEMA, WITH LONG-TERM CURRENT USE OF INSULIN, UNSPECIFIED LATERALITY (H): Primary | ICD-10-CM

## 2021-01-05 DIAGNOSIS — E11.65 TYPE 2 DIABETES MELLITUS WITH HYPERGLYCEMIA, WITH LONG-TERM CURRENT USE OF INSULIN (H): ICD-10-CM

## 2021-01-05 DIAGNOSIS — Z79.4 TYPE 2 DIABETES MELLITUS WITH HYPERGLYCEMIA, WITH LONG-TERM CURRENT USE OF INSULIN (H): ICD-10-CM

## 2021-01-05 DIAGNOSIS — F43.10 POSTTRAUMATIC STRESS DISORDER: ICD-10-CM

## 2021-01-05 DIAGNOSIS — F25.0 SCHIZOAFFECTIVE DISORDER, BIPOLAR TYPE (H): Primary | ICD-10-CM

## 2021-01-05 PROCEDURE — 90832 PSYTX W PT 30 MINUTES: CPT | Performed by: SOCIAL WORKER

## 2021-01-05 PROCEDURE — 99605 MTMS BY PHARM NP 15 MIN: CPT | Performed by: PHARMACIST

## 2021-01-05 PROCEDURE — 99214 OFFICE O/P EST MOD 30 MIN: CPT | Performed by: NURSE PRACTITIONER

## 2021-01-05 PROCEDURE — 99607 MTMS BY PHARM ADDL 15 MIN: CPT | Performed by: PHARMACIST

## 2021-01-05 RX ORDER — FLUCONAZOLE 150 MG/1
150 TABLET ORAL DAILY
Qty: 3 TABLET | Refills: 0 | Status: SHIPPED | OUTPATIENT
Start: 2021-01-05 | End: 2021-01-08

## 2021-01-05 RX ORDER — AMANTADINE HYDROCHLORIDE 100 MG/1
CAPSULE, GELATIN COATED ORAL
Qty: 60 CAPSULE | Refills: 3 | Status: SHIPPED | OUTPATIENT
Start: 2021-01-05 | End: 2021-07-06

## 2021-01-05 NOTE — PATIENT INSTRUCTIONS
- Try to use your novolog at night.   - Use other sugar options in your coffee to help with your blood sugars.   - Diflucan to help with the yeast infection.   - More therapy with Don and then follow-up on medication changes.   - Increase amantadine to 100 mg in the morning and 200 mg at bedtime.   - Call clinic with any questions or concerns.

## 2021-01-05 NOTE — PROGRESS NOTES
[facility]   January 5, 2021    Behavioral Health Clinician Progress Note    Patient Name: Nena Tang           Service Type:  Individual      Service Location:   Face to Face in Clinic     Session Start Time: 2:20 pm  Session End Time:  2:55pm      Session Length: 16 - 37      Attendees: Patient, PCP and MTM    Visit Activities (Refresh list every visit): [facility]     Diagnostic Assessment Date: 1-23-18 Updated DA completed December 12, 2019  Treatment Plan Review Date: 2-  CGI date completed: 11-    See Flowsheets for today's PHQ-9 and TAMIA-7 results  Previous PHQ-9:   PHQ-9 SCORE 10/26/2018 5/7/2019 12/1/2020   PHQ-9 Total Score - - -   PHQ-9 Total Score - - -   PHQ-9 Total Score 20 22 13     Previous TAMIA-7:   TAMIA-7 SCORE 2/3/2017 3/13/2018 10/26/2018   Total Score - - -   Total Score 0 17 19   Total Score - - -       ALEXANDRA LEVEL:  ALEXANDRA Score (Last Two) 8/30/2011 6/9/2014   ALEXANDRA Raw Score 42 37   Activation Score 66 49.9   ALEXANDRA Level 3 2       DATA  Extended Session (60+ minutes): No  Interactive Complexity: No  Crisis: No  [facility] Patient: No    Treatment Objective(s) Addressed in This Session:  Target Behavior(s): disease management/lifestyle changes mental health    Depressed Mood: Increase interest, engagement, and pleasure in doing things  Decrease frequency and intensity of feeling down, depressed, hopeless  Improve quantity and quality of night time sleep / decrease daytime naps  Identify negative self-talk and behaviors: challenge core beliefs, myths, and actions  Improve concentration, focus, and mindfulness in daily activities   Feel less fidgety, restless or slow in daily activities / interpersonal interactions  Anxiety: will experience a reduction in anxiety and will develop more effective coping skills to manage anxiety symptoms  Thought Disturbance: will develop skills to more effectively manage symptoms, will develop more effective support systems and  will continue to take medications as prescribed  PTSD Symptom Management  Psychological distress related to Diabetes  Psychological distress related to Chronic Disease Management    Current Stressors / Issues:    The patient was seen by [facility] per the request of the PCP-she stated her sister is nagging on her about taking of the the self-I wish that I could do it on my own-she is understanding that she and her sister are working together for her health-she stated that she want she wants to gain control of her blood sugars-she has been having some high blood sugar readings-she is okay with asking her sister help remind her about the shots-she has been having scaring dreams-she has been watching scary dreams-she took him out the closet-she wants to maintain that will to live and that will to want to take care of the self-she has not been seeing the main-she has visual hallucination that are bothersome but she try to not pay them any attention- wants me to go to a day program-        Progress on Treatment Objective(s) / Homework:  Minimal progress - ACTION (Actively working towards change); Intervened by reinforcing change plan / affirming steps taken    Motivational Interviewing    MI Intervention: Expressed Empathy/Understanding, Supported Autonomy, Collaboration, Evocation, Permission to raise concern or advise, Open-ended questions, Reflections: simple and complex, Rolled with resistance: Emphasized patient autonomy, Simple reflection and Evoked patient agenda and Change talk (evoked)     Change Talk Expressed by the Patient: Desire to change Ability to change Reasons to change Need to change Committment to change Activation Taking steps    Provider Response to Change Talk: E - Evoked more info from patient about behavior change, A - Affirmed patient's thoughts, decisions, or attempts at behavior change, R - Reflected patient's change talk and S - Summarized patient's change talk statements      Care Plan  review completed: No    Medication Review:  No changes to current psychiatric medication(s)     Medication Compliance:  Yes    Changes in Health Issues:   Yes: please see medical note by PCP     Chemical Use Review:   Substance Use: Chemical use reviewed, no active concerns identified      Tobacco Use: No current tobacco use.      Assessment: Current Emotional / Mental Status (status of significant symptoms):  Risk status (Self / Other harm or suicidal ideation)  Patient has had a history of suicidal ideation: yes  Patient denies current fears or concerns for personal safety.  Patient denies current or recent suicidal ideation or behaviors.  Patient denies current or recent homicidal ideation or behaviors.  Patient denies current or recent self injurious behavior or ideation.  Patient denies other safety concerns.  A safety and risk management plan has not been developed at this time, however patient was encouraged to call [place] [phone] should there be a change in any of these risk factors.    Appearance:   Appropriate   Eye Contact:   Good   Psychomotor Behavior: Retarded (Slowed)   Attitude:   Cooperative   Orientation:   All  Speech   Rate / Production: Normal    Volume:  Normal   Mood:    Anxious  Normal  Affect:    Appropriate   Thought Content:  Clear   Thought Form:  Coherent  Goal Directed  Logical   Insight:    Fair     Diagnoses:  1. Schizoaffective disorder, bipolar type (H)    2. Posttraumatic stress disorder        Collateral Reports Completed:  Not Applicable    Plan: (Homework, other):  Patient was given information about behavioral services and encouraged to schedule a follow up appointment with the clinic [facility] as needed to work on helping the patient with management of mood states and to work on stress management around her grieving process-also to help the patient with her behavioral needs to comply with treatment medical recommendations.  She was also given information about mental health  symptoms and treatment options .  CD Recommendations: Maintain Sobriety.    Richardson [place], [facility], [facility]                                                    Treatment Plan    Client's Name: Nena Tang  YOB: 1960    Date: Reviewed/updated on 11/17/2020    DSM5 Diagnoses: (Sustained by DSM5 Criteria Listed Above)  Diagnoses:  295.70 (F25.0), Schizoaffective disorder, depressive type; 309.81 (F43.10) Posttraumatic Stress Disorder with panic specifier, history of chemical dependency issues (polysubstance dependence)  Psychosocial & Contextual Factors: Academics / Education - yes  Activities of Daily Living - yes  Financial management yes  Follow through with Medical recommendations - yes  Occupational / Vocational - yes  Social / Relational - yes, grief and loss  WHODAS Score: 30      Referral / Collaboration:  Referral to another professional/service is not indicated at this time..    Anticipated number of session or this episode of care: 20      MeasurableTreatment Goal(s) related to diagnosis / functional impairment(s)  Goal 1: Client will improve her ability to manage anxiety and mood states.     I will know I've met my goal when the man does not paralyze the me with fear.     Objective #A (Client Action)    Client will increase understanding of steps in the grief process.  Status: Continued - Date(s):  11/17/2020 the patient reported that she has been dealing with her grief in a good way-she has been spending time with her family and has some activities that she enjoys.    Intervention(s)  Therapist will teach emotional recognition/identification. teach the navigation of grieving encouraging acceptance and adjustment.    Objective #B  Client will Decrease frequency and intensity of feeling down, depressed, hopeless  Decrease thoughts that you'd be better off dead or of suicide / self-harm.  Status: Continued - Date(s): 11/17/2020 the patient has been more active spending time with her son-she has  been sleeping good-she stated that she has been sleeping late-no thoughts of suicide-she has been doing a lot of walking-she has improved her ability manage her health issues.    Intervention(s)  Therapist will teach emotional regulation skills. with the use of mindful coping strategies and open communication of feelings and thoughts (getting it out to another person)    Client has reviewed and agreed to the above plan.      Richardson oLpez, [facility]  11/17/2020        ______________________________________________________________________

## 2021-01-05 NOTE — PROGRESS NOTES
Medication Therapy Management (MTM) Encounter    ASSESSMENT/PLAN:                            Medication Adherence/Access: See below for considerations    Type 2 Diabetes: Patient is not meeting A1c goal of < 7%. Self monitoring of blood glucose is not at goal of fasting  mg/dL and post prandial < 180 mg/dL. Patient would benefit from Bolus / Rapid Acting Insulin (Novolog) : stay on the same dose, work on PM adherence.  Start CGM, she will ask RN for help with Dexcom G5. Since this CGM still requires daily fingersticks for calibration, will attempt to order Freestyle Libre2. Dexcom G6 sensors need to be replaced every 10 days, which does not align with RN schedule. Libre2 is every 2 weeks and patient could have assistance from HCRN.    Schizoaffective: Prefer not to decrease Invega dose due to hallucinations becoming more bothersome. Consider dose increase in amantadine to 300mg/day for TD - will try dividing dose twice daily to improve adherence. Consider referral for consult psychiatry for additional recommendations. Will continue to involve [facility]. Pt encouraged to attend day program.     Fibromyalgia: increased fatigue may be 2/2 medication vs elevated glucose. Will monitor.     PLAN:   1. Work on adherence to PM Novolog with dinner  2. Increase amantadine to 100mg AM and 200mg PM   3. Start CGM, trial of Dexcom G5 since she already has this product. Will try to switch to Freestyle Libre2, Rx sent.    Will follow up in 1 month.    SUBJECTIVE/OBJECTIVE:                           Nena Tang is a 60 year old female coming in for a follow-up visit. She was referred to me from Rowena Haas. Today's visit is a co-visit with PCP and Richardson Lopez [facility]. Today's visit is a follow-up MTM visit from 11/17/20.   This is a follow-up visit but the first MTM visit of this calendar year.      Reason for visit: high blood sugars.    Tobacco: She reports that she has never smoked. She has never used smokeless  "tobacco.  Alcohol: not currently using    Personal Healthcare Goals: take better care of diabetes (new year resolution)    Medication Adherence/Access:   Issues reported and discussed below  Medications are set up in a pill box by HCRN.    Type 2 Diabetes: Pt currently taking Lantus 60u daily, Novolog 24u AM (forgetting PM dose), Trulicity 1.5mg weekly. Pt is not experiencing side effects.    SMB-2 times daily.   Ranges (pt reported): 90-400s  No hypoglycemia.  Recent symptoms of high blood sugar? yeast infection, thirsty, polyuria, blurry vision  Eye exam: up to date  Foot exam: up to date  ACEi/ARB: No.   Urine Albumin:   Lab Results   Component Value Date    MICROL 7 09/15/2020   Aspirin: Taking 81mg daily and denies side effects  Diet/Exercise: eating twice a day. Sugar in her coffee, no other sugary drinks. Little bit of candy.   Lab Results   Component Value Date    A1C 9.1 2020    A1C 9.7 09/15/2020    A1C 8.6 2020    A1C 6.2 2019    A1C 6.6 2019     Schizoaffective: Currently taking sertraline 200mg daily, Invega 9mg daily, amantadine 100mg BID, trazodone 100mg HS, clonazepam 0.5mg HS.  Mood has been ok, feels like sertraline is working and asking for a little higher dose. Hallucinations have been a little worse, seeing dead bodies. More bothersome but trying to ignore it.   Irritated by her sister \"nagging\" her to take medications. She does admit she would probably miss doses of medication without her sister reminding her.  Side effects: reports her sister notices mouth movements and daytime fatigue.  Having more nightmares, wondering if watching scary movies is part of increased stress. Also grief around  and mother, recently took their ashes out of her closet and thinking of them more.  See [facility] notes for additional details.     Fibromyalgia: currently taking methocarbamol 500mg AM and 1000mg PM and restarted topiramate, as pain specialist did not want to further increase " her dose of gabapentin. She has been more tired lately during the day.   Started working with pain clinic for medication management and PT.     Today's Vitals: /74   Pulse 77   Temp 97.9  F (36.6  C) (Temporal)   Wt 223 lb (101.2 kg)   LMP 01/06/2015   SpO2 96%   BMI 43.55 kg/m      I spent 45 minutes with this patient today. All changes were made via collaborative practice agreement with Rowena Haas. A copy of the visit note was provided to the patient's primary care provider.    The patient was given a summary of these recommendations. See Provider note/AVS from today.     Eugenia De Jesus, PharmD, BCACP

## 2021-01-05 NOTE — PROGRESS NOTES
SUBJECTIVE:                                                    Nena Tang is a 60 year old female who presents to clinic today for the following health issues:  [facility]: [place]  MTM: Eugenia    Diabetes Follow-up  Reports that she has been having high blood sugars. Reports that she has been working on taking her insulin daily in the morning. Has been taking her Trulicity weekly. Will work on the Novolog at bedtime.   BS highest 400's Lowest 90's: Checking in the morning and sometimes in the night.   Feels like she has a yeast infection. Increase thirst with the high blood sugars.     How often are you checking your blood sugar? 1- 2 times per day.     What concerns do you have today about your diabetes? None and Blood sugar is often over 200    BP Readings from Last 2 Encounters:   01/05/21 132/74   01/05/21 132/74     Hemoglobin A1C (%)   Date Value   12/01/2020 9.1 (H)   09/15/2020 9.7 (H)     LDL Cholesterol Calculated (mg/dL)   Date Value   12/01/2020 141 (H)   12/22/2019 86       Medication Side Effects: Feels tired and low energy and was concerned about methocarbamol side effects with the sedation component. Patient reports that she has been taking methocarbamol 1 tablet in the morning and 2 tablets at night time. Does not feel like this has been helpful for her.       Mental Health Follow up   New Years resolution is to take care of her diabetes. Patient reports that she is looking for a bigger apartment to move with her sister. States that her sister has been nagging at her to get things done.   Has been having bad dreams and scary dreams- has been watching a lot of scary movies. Thinking about her , mom and sister in her dreams- has all of their remains in her room. Patient reports that she has been suicidal and wondering when she can see them.   Extrapyramidal signs bothering her: mouth movement and leg movement.   Working on getting her to a day program.     Status since last visit: still struggling  with keeping up with her diabetes.     See PHQ-9 for current symptoms.  Other associated symptoms: None    Complicating factors: as noted above, concerns with mouth movement due to her medications.   Significant life event:  No   Current substance abuse:  None  Anxiety or Panic symptoms:  No    Sleep - troubled sleep with dreams.   Appetite - fair. Trying to watch her sweet eating.   Exercise - none, pain with movements.     Caffeine - yes, coffee and diet mountain dew.     PHQ-9  English PHQ-9   Any Language               Problems taking medications regularly: No    Medication side effects: none    Diet: diabetic    Social History     Tobacco Use     Smoking status: Never Smoker     Smokeless tobacco: Never Used   Substance Use Topics     Alcohol use: No     Frequency: Never     Comment: last month        Problem list and histories reviewed & adjusted, as indicated.  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 (H)     Illiterate     Irritable bowel syndrome     overweight - BMI >35     Takotsubo cardiomyopathy     CAD (coronary artery disease)     Restless legs syndrome (RLS)     CINDY (obstructive sleep apnea)- mild AHI 10.3     Verbal auditory hallucination     Chronic low back pain     Schizoaffective disorder, depressive type (H)     Migraine headache     HTN, goal below 140/90     Health Care Home     Lumbago     Cervicalgia     Cocaine abuse, episodic (H)     Suicidal ideation     Esophageal reflux     Mild nonproliferative diabetic retinopathy (H)     Tardive dyskinesia     Alcohol use     Left cataract     Falls frequently     History of uterine cancer     Psychophysiological insomnia     Dysuria     Asymptomatic postmenopausal status     Abdominal pain, right lower quadrant     Infectious  encephalopathy     Non-intractable vomiting with nausea     Thoughts of self harm     Gastroenteritis     Posttraumatic stress disorder     Cervical cancer screening     Type 2 diabetes mellitus with stage 3 chronic kidney disease, with long-term current use of insulin (H)     Positive AIDA (antinuclear antibody)     Hyperglycemia     Pneumonia     Depression with suicidal ideation     Mixed incontinence     Past Surgical History:   Procedure Laterality Date     C OOPHORECTORMY FOR RAHEL, W/BX  1983    UTERINE     CATARACT IOL, RT/LT Bilateral 2017     COLONOSCOPY N/A 3/16/2017    Procedure: COLONOSCOPY;  Surgeon: Traci Gonzalez MD;  Location:  GI     Coronary CTA  5/21/2014     HYSTERECTOMY  1983    uterine cancer yearly pap's per provider.     LAPAROSCOPIC CHOLECYSTECTOMY  2008     PHACOEMULSIFICATION CLEAR CORNEA WITH STANDARD INTRAOCULAR LENS IMPLANT Left 5/5/2017    Procedure: PHACOEMULSIFICATION CLEAR CORNEA WITH STANDARD INTRAOCULAR LENS IMPLANT;  LEFT EYE PHACOEMULSIFICATION CLEAR CORNEA WITH STANDARD INTRAOCULAR LENS IMPLANT ;  Surgeon: Tyra Diaz MD;  Location:  [facility]     PHACOEMULSIFICATION CLEAR CORNEA WITH STANDARD INTRAOCULAR LENS IMPLANT Right 6/30/2017    Procedure: PHACOEMULSIFICATION CLEAR CORNEA WITH STANDARD INTRAOCULAR LENS IMPLANT;  RIGHT EYE PHACOEMULSIFICATION CLEAR CORNEA WITH STANDARD INTRAOCULAR LENS IMPLANT;  Surgeon: Tyra Diaz MD;  Location:  [facility]     RELEASE TRIGGER FINGER  10/11/2012    Left thumb. Procedure: RELEASE TRIGGER FINGER;  LEFT THUMB TRIGGER RELEASE;  Surgeon: Tay Langley MD;  Location:  [facility]     RELEASE TRIGGER FINGER Right 12/26/2016    Procedure: RELEASE TRIGGER FINGER;  Surgeon: Albino Castañeda MD;  Location: [facility] OR       Social History     Tobacco Use     Smoking status: Never Smoker     Smokeless tobacco: Never Used   Substance Use Topics     Alcohol use: No     Frequency: Never     Comment: last month     Family History   Problem  Relation Age of Onset     Cancer Mother         BLADDER     Respiratory Mother         COPD     Gastrointestinal Disease Mother         CIRRHOSIS OF LI BOLIVAR     Alcohol/Drug Mother      Diabetes Mother      Hypertension Mother      Lipids Mother      C.A.D. Mother      Glaucoma Mother      Alcohol/Drug Sister      Mental Illness Sister      Alcohol/Drug Sister      Psychotic Disorder Sister      Cancer Maternal Grandmother         UNKNOWN TYPE     Cancer Brother         COLON     Cancer - colorectal Brother         IN HIS LATE 30S     Alcohol/Drug Brother          OF HEROIN OVERDOSE AT AGE 22 YRS     Macular Degeneration No family hx of            Current Outpatient Medications   Medication Sig Dispense Refill     acetaminophen (TYLENOL) 650 MG CR tablet Take 1 tablet (650 mg) by mouth every 8 hours as needed for mild pain or fever 120 tablet 1     alcohol swab prep pads Use to swab area of injection/rodolfo as directed. 100 each 3     amantadine (SYMMETREL) 100 MG capsule Take 1 capsule (100 mg) by mouth 2 times daily 60 capsule 3     aspirin (ASA) 81 MG EC tablet TAKE 1 TABLET (81MG) BY MOUTH DAILY (Patient not taking: Reported on 12/15/2020) 90 tablet 3     atorvastatin (LIPITOR) 80 MG tablet TAKE 1 TABLET (80MG) BY MOUTH DAILY 30 tablet 11     blood glucose (NO BRAND SPECIFIED) test strip Use to test blood sugar 4 times daily or as directed. 100 strip 11     clonazePAM (KLONOPIN) 0.5 MG tablet Take 1 tablet (0.5 mg) by mouth At Bedtime 30 tablet 3     diclofenac (VOLTAREN) 1 % topical gel Place 4 g onto the skin 4 times daily as needed for moderate pain       famotidine (PEPCID) 20 MG tablet Take 1 tablet (20 mg) by mouth daily 30 tablet 3     fluticasone-salmeterol (ADVAIR) 250-50 MCG/DOSE inhaler Inhale 1 puff into the lungs every 12 hours as needed       gabapentin (NEURONTIN) 300 MG capsule Take 1 capsule (300 mg) by mouth At Bedtime 30 capsule 3     hydrOXYzine (VISTARIL) 25 MG capsule Take 1 capsule (25  mg) by mouth every 4 hours as needed for anxiety 60 capsule 3     insulin aspart (NOVOLOG FLEXPEN) 100 UNIT/ML pen Inject 24 units under the skin 3 times daily before meals as directed. Take an extra 10 units with sugary foods. 15 mL 1     insulin glargine (LANTUS PEN) 100 UNIT/ML pen Inject 60 Units Subcutaneous every morning       insulin pen needle (NOVOFINE 30) 30G X 8 MM miscellaneous USE 4 DAILY OR AS DIRECTED 400 each 0     ipratropium - albuterol 0.5 mg/2.5 mg/3 mL (DUONEB) 0.5-2.5 (3) MG/3ML neb solution Take 1 vial (3 mLs) by nebulization every 6 hours as needed for shortness of breath / dyspnea or wheezing 30 vial 0     losartan (COZAAR) 100 MG tablet Take 1 tablet (100 mg) by mouth daily 30 tablet 3     methocarbamol (ROBAXIN) 500 MG tablet Take 1-2 tablets (500-1,000 mg) by mouth 3 times daily as needed for muscle spasms . Caution for sedation 180 tablet 1     metoprolol succinate ER (TOPROL-XL) 25 MG 24 hr tablet Take 2 tablets (50 mg) by mouth daily 180 tablet 3     miconazole (MICATIN/MICRO GUARD) 2 % external powder Apply topically once as needed       NOVOLOG FLEXPEN 100 UNIT/ML soln Inject 24 Units Subcutaneous 3 times daily (with meals) And an extra 10 units with sugary foods 15 mL 1     nystatin (MYCOSTATIN) 307845 UNIT/GM external cream Apply topically 2 times daily 30 g 3     order for DME Equipment being ordered: Depends. 30 each 4     order for DME Equipment being ordered: CPAP Supplies. 1 each 0     paliperidone ER (INVEGA) 9 MG 24 hr tablet Take 1 tablet (9 mg) by mouth At Bedtime 60 tablet 1     senna-docusate (SENOKOT-S/PERICOLACE) 8.6-50 MG tablet Take 1 tablet by mouth 2 times daily as needed for constipation 60 tablet 10     sertraline (ZOLOFT) 100 MG tablet TAKE 2 TABLETS (200 MG) BY MOUTH DAILY 60 tablet 3     thin (NO BRAND SPECIFIED) lancets Use with lanceting device. To accompany: Blood Glucose Monitor Brands: per insurance. 100 each 6     topiramate (TOPAMAX) 25 MG tablet Take  25mg (1 tab) by mouth at bedtime.  Increase by 25mg (1 tab) every 7 days, to goal of 50mg (2 tabs) twice daily 120 tablet 1     traZODone (DESYREL) 100 MG tablet Take 1 tablet (100 mg) by mouth nightly as needed for sleep 30 tablet 3     TRULICITY 1.5 MG/0.5ML pen Inject 1.5 mg Subcutaneous once a week Every Friday 4 mL 3     zinc oxide (DESITIN) 40 % external ointment Apply topically as needed for dry skin or irritation 56 g 0     Allergies   Allergen Reactions     Imidazole Antifungals Hives     Tolerates diflucan     Ketoprofen Itching     Pruritis to topical     Lisinopril Hives     Metformin Other (See Comments)     Patient hospitalized for lactic acidosis - admitting provider suspectd caused by metformin     Metronidazole Hives     Posaconazole Hives     Tolerates diflucan     Recent Labs   Lab Test 12/01/20  1730 10/04/20  1224 09/15/20  1454 06/30/20  1624 12/22/19  0651 12/22/19  0651 08/06/19  1043 08/06/19  1043 05/24/19  0916 05/24/19  0916   A1C 9.1*  --  9.7* 8.6*  --   --    < > 6.6*  --   --    *  --   --   --   --  86  --  81  --   --    HDL 46*  --   --   --   --  42*  --  39*  --   --    TRIG 141  --   --   --   --  132  --  131  --   --    ALT  --  29 29 35   < > 22   < >  --    < > 30   CR  --  0.86 0.85 0.98   < > 0.86   < >  --    < > 0.87   GFRESTIMATED  --  73 74 63   < > 74   < >  --    < > 73   GFRESTBLACK  --  85 86 73   < > 85   < >  --    < > 85   POTASSIUM  --  4.2 4.4 4.3   < > 4.1   < >  --    < > 4.0   TSH  --   --   --   --   --  1.23  --   --   --  1.62    < > = values in this interval not displayed.      BP Readings from Last 3 Encounters:   12/22/20 (!) 152/87   12/01/20 130/74   10/20/20 122/70    Wt Readings from Last 3 Encounters:   12/01/20 98.1 kg (216 lb 5 oz)   10/20/20 101.2 kg (223 lb)   10/07/20 99.3 kg (219 lb)          Labs reviewed in EPIC  Problem list, Medication list, Allergies, and Medical/Social/Surgical histories reviewed in EPIC and updated as  appropriate.    ROS: Constitutional, neuro, ENT, endocrine, pulmonary, cardiac, gastrointestinal, genitourinary, musculoskeletal, integument and psychiatric systems are negative, except as otherwise noted above in the HPI.    OBJECTIVE:                                                    /74   Pulse 77   Temp 97.9  F (36.6  C) (Temporal)   Wt 101.2 kg (223 lb)   LMP 01/06/2015   SpO2 96%   BMI 43.55 kg/m    Body mass index is 43.55 kg/m .    GENERAL: healthy, alert and no distress  EYES: Eyes grossly normal to inspection, PERRL and conjunctivae and sclerae normal  NECK: no adenopathy, no asymmetry, masses, or scars and thyroid normal to palpation  RESP: lungs clear to auscultation - no rales, rhonchi or wheezes  CV: regular rate and rhythm, normal S1 S2, no S3 or S4, no murmur, click or rub, no peripheral edema and peripheral pulses strong  ABDOMEN: soft, nontender, no hepatosplenomegaly, no masses and bowel sounds normal  MS: no gross musculoskeletal defects noted, no edema  NEURO: Normal strength and tone, mentation intact and speech normal    Mental Status Assessment:  Appearance:   Appropriate   Eye Contact:   Good   Psychomotor Behavior: Normal   Attitude:   Cooperative   Orientation:   All  Speech   Rate / Production: Slow  Normal    Volume:  Normal   Mood:    Normal Sad   Affect:    Appropriate   Thought Content:  Clear   Thought Form:  Coherent  Logical   Insight:    Fair   Attention Span and Concentration: appropriate  Recent and Remote Memory:  intact  Fund of Knowledge: appropriate  Muscle Strength and Tone: normal   Suicidal Ideation: reports no thoughts, no intention  See [facility] notes     Diagnostic Test Results:  Labs reviewed in Epic  none      ASSESSMENT/PLAN:                                                    1. Yeast infection of the vagina  Reported vaginal candidiasis symptoms, will treat preemptively and check in with patient in 4 weeks. Discussed her uncontrolled diabetes being the  plausible cause.   - fluconazole (DIFLUCAN) 150 MG tablet; Take 1 tablet (150 mg) by mouth daily for 3 days  Dispense: 3 tablet; Refill: 0    2. Type 2 diabetes mellitus with hyperglycemia, with long-term current use of insulin (H)  Uncontrolled. Working to get patient to be more compliant without making any adjustments in her medications at the moment.   Lab Results   Component Value Date    A1C 9.1 12/01/2020    A1C 9.7 09/15/2020    A1C 8.6 06/30/2020    A1C 6.2 12/03/2019    A1C 6.6 08/06/2019       3. Schizoaffective disorder, depressive type (H)  Some ups and down with her mood. See above. Encouraged patient to work mor closely with Don for individual therapy.   - Discussed with patient about checking in with psychiatry (Dr. Brothers) for medication management.   - amantadine (SYMMETREL) 100 MG capsule; Take 1 capsule (100 mg)  in the morning and 2 capsules (200 mg) in the evening.  Dispense: 60 capsule; Refill: 3    4. Chronic right-sided low back pain without sciatica  5. Generalized muscle weakness  Uncontrolled chronic pain reported that is causing her to be less mobile which is causing her to have generalized weakness.   - Follow-up with pain PT as scheduled.   - Follow-up with Dr. Paz (pain provider) as scheduled.     6. Mixed incontinence  No improvement- using depends.  Scheduled for pelvic PT starting next week.    Patient Instructions   - Try to use your novolog at night.   - Use other sugar options in your coffee to help with your blood sugars.   - Diflucan to help with the yeast infection.   - More therapy with Don and then follow-up on medication changes.   - Increase amantadine to 100 mg in the morning and 200 mg at bedtime.   - Call clinic with any questions or concerns.       ART [facility]

## 2021-01-06 RX ORDER — FLASH GLUCOSE SCANNING READER
1 EACH MISCELLANEOUS ONCE
Qty: 1 DEVICE | Refills: 0 | Status: SHIPPED | OUTPATIENT
Start: 2021-01-06 | End: 2021-01-06

## 2021-01-06 RX ORDER — FLASH GLUCOSE SENSOR
1 KIT MISCELLANEOUS
Qty: 2 EACH | Refills: 11 | Status: SHIPPED | OUTPATIENT
Start: 2021-01-06 | End: 2021-09-02

## 2021-01-19 ENCOUNTER — OFFICE VISIT (OUTPATIENT)
Dept: PALLIATIVE MEDICINE | Facility: CLINIC | Age: 61
End: 2021-01-19
Payer: COMMERCIAL

## 2021-01-19 DIAGNOSIS — G89.4 CHRONIC PAIN SYNDROME: Primary | ICD-10-CM

## 2021-01-19 DIAGNOSIS — G89.29 CHRONIC BILATERAL LOW BACK PAIN WITHOUT SCIATICA: ICD-10-CM

## 2021-01-19 DIAGNOSIS — M79.7 FIBROMYALGIA: ICD-10-CM

## 2021-01-19 DIAGNOSIS — M54.50 CHRONIC BILATERAL LOW BACK PAIN WITHOUT SCIATICA: ICD-10-CM

## 2021-01-19 PROCEDURE — 97162 PT EVAL MOD COMPLEX 30 MIN: CPT | Mod: GP | Performed by: PHYSICAL THERAPIST

## 2021-01-19 PROCEDURE — 97535 SELF CARE MNGMENT TRAINING: CPT | Mod: GP | Performed by: PHYSICAL THERAPIST

## 2021-01-19 NOTE — PROGRESS NOTES
"PHYSICAL THERAPY INITIAL EVALUATION and PLAN OF CARE    Patient Name: Nena Tang     : 1960    MRN: [id number]   Pain Management Provider:   Ruba Paz MD and Pam Be PT  (PhD eval scheduled for 3/3/21)    SUBJECTIVE:  PRESENTATION AND ETIOLOGY  Chief Complaint: Widespread pain greatest in LB right > left, also reports pain in bilateral feet and shoulders, decreased ROM, decreased strength and endurance and deconditioning limiting the ability to perform activities of daily living.      Onset / Etiology: \"quite a while\", used to have to lift her  out of w/c and he was over 6ft - \"I think that caught up with me.\"    Pattern Since Onset: Worsened    Pertinent Medical  / Surgical History: Epic Snapshot Reviewed:  Contributing factors to the severity / complexity of the patient's chief complaint include: refer to provider's notes in EPIC - note hx schizoaffective d/o, PTSD, DM    Symptom Pattern: Night: pain tends to be worse at this time    Pain: Frequency: Constant           Intensity: Best 6/10, Worst 8/10, ADP 6-8/10    LEVEL OF FUNCTION AT START OF CARE  Current Aggrevating Activities / Functional Limitations:   Walking tolerance: \"maybe a block\" then back starts hurting - often uses scooter when at store (if available) - uses 4ww when out in community, SEC when in the house  Standing tolerance: can stand for a short while to wash a few dishes (10-15 min)  Sitting tolerance: no limits in sitting  Disturbed sleep: when stretching legs out they cramp - happening nightly; occasionally has difficulty falling asleep but once she is asleep she stays asleep and feels rested upon waking  Transitions: all are more challenging - hard to get out of tub - sister has to help b/c legs feel like they'll give out - she takes baths so unable to use shower chair  Household Activity: doesn't mop or sweep b/c of her back ; having difficulty with laundry - putting them in and pulling out  Work " "limitations: on disability  Recreational limitations: not asked  Other: has to go up 3 flights to get to her apt - this is very challenging and her back hurts as she is doing it; difficulty reach overhead - into cupboards, getting shirts on    Prior Functional Level: Gradual onset of limitations.      Home or Community Barriers: doesn't drive - uses medical transportation    Patient's selected goals for physical therapy: \"see if I can walk better, a little longer than I can now\"    CURRENT / PREVIOUS INTERVENTION(S):     Relieving Activities:  - Self Care: Doesn't do much beyond take a rest (sit with back resting back or lie down)  - Current HEP: tries to do some walking - maybe \"few times / wk\" - can walk in hallway when not going outside - says she can't go for very long before her back bothers her  - Relaxation Practice: learned some in PT before - doesn't practice currently    Previous / Current therapies for current chief complaint: PT and OT last year sometime - had it through home care (note OT episodes of care in Dec 2020 and June 2020 - no reference to PT)    DEMOGRAPHICS  Employment Status: Disability    Social Support: Lives with sister, has PCA help 6H/day,  and \"lots of other supports\" ([facility] worker)    Patient's perceived quality of life:  Lots of stress     Knowledge/understanding of the role of stress/MH on pain experience:  none    ===============================================================  OBJECTIVE:  POSTURE/MOBILITY:  Observation: Pleasant and engaged - occasionally slow to respond.  Chairs don't support pt's short stature so her feet aren't solidly supported under her when sitting back to use back rest.  Posture is slouched in both sitting and standing.  Narrow SUSIE in sitting.  Static and Dynamic: Transitions slowly but independently    GAIT, LOCOMOTION, and BALANCE:  Gait and Locomotion: No AD with her today despite reports of using both SEC and walker (\"I forgot\" and \"it's " "slippery outside\" - which was accurate).  Gait speed is slow and step length is shortened bilaterally.  No trunk ROT or arm swing with ambulation.      RANGE OF MOTION:   Active:   - Shoulders: flex 90 degrees bilaterally, ABD approximately 70 degrees bilaterally  - Trunk: flex WFL (c/o pulling and pain at end range), bilateral SB decreased by 25% (c/o pulling and pain at end ranges);   - LEs NT today      MUSCLE PERFORMANCE:   Strength: Able to demonstrate partial squat to stand; No core engagement observed to support posture.   Flexibility: Note tightness in bilateral pecs, QLs, lumbar paraspinals, HS, gastrocs     Pain behaviors: None      ===============================================================  Today's Treatment:  Initial evaluation  Self Care/Home Managment Training:  (20 min)  1) instructed in use of restorative supports for greater comfort in sitting - support under feet, towel roll along spine. Included positioning of her feet (hip width apart vs close together). Pt reported greater comfort with all these supports in place.  Encouraged pt to trial these supports in sitting at home.  2) Body mechanics ideas for laundry - use of chair to put basket on, sit to fold clothes vs stand.  3) Offered gel pack for home modality use (heat or ice) - pt declined.      Educated on pain PT philosophy of 1) HEP instruction 2) posture /kinesthetic awareness education and 3) self care/self regulation.   ===============================================================  ASSESSMENT:    Patient requires PT intervention for the following impairments: Limited knowledge of condition and / or self care - inability to control symptoms, Impaired posture / muscle imbalance, Muscle flexibility limitations, Muscle strength and endurance limitations, Pain, ROM limitations and Deconditioning    Anticipated Goals and Expected Outcomes:  STG (attempt to meet in 8-12 weeks):  Pt will report daily HEP/stretching program.  Pt will report " two forms of mini breaks taken each day as a self regulation tool.    Pt will report two pacing strategies used to better manage daily activity.  Pt will demonstrate how to find a neutral position in sitting, standing and with ambulation.   Pt will report increased ease with tub transfers for greater safety when bathing.  Pt will report increased ease and comfort walking one block for greater ease with community mobility needs.      Rehab potential for achieving goals: fair.  Prognosis will depend on concurrent addressing of psychosocial contributing factors.   ===============================================================  PLAN:   Patient will benefit from skilled physical therapy consisting of:   -neuromuscular reeducation for improved kinesthetic sense/body awareness and posture as well as education in ANS regulation techniques ;   -education in self care / home management training to include instruction in: daily symptom control techniques and flare management;   -therapeutic activities to achieve improved functional performance in daily activities through use of self care including pacing and energy conservation, good body mechanics and restorative positioning;   -therapeutic exercise to develop: strength, endurance, flexibility and core stability through HEP instruction.    Assessment will be ongoing with changes in treatment as indicated.  Benefits/risks/alternatives to treatment have been reviewed and the patient has been instructed to contact this office if they have any questions or concerns.  This plan of care has been discussed with the patient and the patient is in agreement.     Frequency / Duration:  Patient will be seen for a total of 12 visits; at a frequency of QOW x 10, tapering to 1x/mo x 2.    Total Visit Time: 50  minutes            Pam eB, PT                                 Date:  1/19/2021    =====================================================  **  Referring Provider  Certification: Referring Provider reviewing certifies that the above treatment / plan of care is required and authorized, and that the patient's plan will be reviewed every ninety (90) days **.   ======================================================           PRESENT: NA    MULTIDISCIPLINARY PATIENT / FAMILY EDUCATION RECORD  Department:  Physical Therapy    Readiness to Learn: Ability to understand verbal instructions, Ability to understand written instructions, Knowledge of educational needs / treatment plan  Specific Barriers to Learning: None  Referrals: None  Learning Needs: Pain management education to improve daily activity tolerance.   Who: Patient  How: Demonstration, Verbal instructions, Written instructions  Response: Appropriate verbal response, Asked questions, Demonstrated ability, Verbalized recall / understanding

## 2021-01-21 ENCOUNTER — MEDICAL CORRESPONDENCE (OUTPATIENT)
Dept: HEALTH INFORMATION MANAGEMENT | Facility: CLINIC | Age: 61
End: 2021-01-21

## 2021-01-22 ENCOUNTER — MEDICAL CORRESPONDENCE (OUTPATIENT)
Dept: HEALTH INFORMATION MANAGEMENT | Facility: CLINIC | Age: 61
End: 2021-01-22

## 2021-01-24 NOTE — TELEPHONE ENCOUNTER
"Last Written Prescription Date:  11/17/17  Last Fill Quantity: 90,  # refills: 1   Last office visit: 4/17/2018 with prescribing provider:     Future Office Visit:   Next 5 appointments (look out 90 days)     May 22, 2018  1:30 PM CDT   Return Visit with ART Arias CNP   [facility]    [address]  [address]  [facility] [address]   [phone]            May 22, 2018  1:30 PM CDT   Return Visit with BIN Mondragon   [facility] ([facility])    [address]  [address]  [facility] [address]   [phone]            Jun 08, 2018  3:30 PM CDT   Return Visit with Kraig Pedersen MD   [facility] ([facility])    [address]  [facility] [address]   [phone]                 Requested Prescriptions   Pending Prescriptions Disp Refills     ranitidine (ZANTAC) 300 MG tablet 90 tablet 1     Sig: TAKE 1 TABLET (300MG) BY MOUTH AT BEDTIME    H2 Blockers Protocol Passed    5/9/2018  2:33 PM       Passed - Patient is age 12 or older       Passed - Recent (12 mo) or future (30 days) visit within the authorizing provider's specialty    Patient had office visit in the last 12 months or has a visit in the next 30 days with authorizing provider or within the authorizing provider's specialty.  See \"Patient Info\" tab in inbasket, or \"Choose Columns\" in Meds & Orders section of the refill encounter.              " done

## 2021-01-26 ENCOUNTER — OFFICE VISIT (OUTPATIENT)
Dept: BEHAVIORAL HEALTH | Facility: CLINIC | Age: 61
End: 2021-01-26
Payer: COMMERCIAL

## 2021-01-26 ENCOUNTER — VIRTUAL VISIT (OUTPATIENT)
Dept: PALLIATIVE MEDICINE | Facility: CLINIC | Age: 61
End: 2021-01-26
Payer: COMMERCIAL

## 2021-01-26 DIAGNOSIS — M53.3 SI (SACROILIAC) JOINT DYSFUNCTION: ICD-10-CM

## 2021-01-26 DIAGNOSIS — F25.0 SCHIZOAFFECTIVE DISORDER, BIPOLAR TYPE (H): Primary | ICD-10-CM

## 2021-01-26 DIAGNOSIS — G89.4 CHRONIC PAIN SYNDROME: ICD-10-CM

## 2021-01-26 DIAGNOSIS — M79.7 FIBROMYALGIA: Primary | ICD-10-CM

## 2021-01-26 DIAGNOSIS — F43.10 POSTTRAUMATIC STRESS DISORDER: ICD-10-CM

## 2021-01-26 PROCEDURE — 90832 PSYTX W PT 30 MINUTES: CPT | Performed by: SOCIAL WORKER

## 2021-01-26 PROCEDURE — 99213 OFFICE O/P EST LOW 20 MIN: CPT | Mod: TEL | Performed by: PSYCHIATRY & NEUROLOGY

## 2021-01-26 ASSESSMENT — PAIN SCALES - GENERAL: PAINLEVEL: SEVERE PAIN (7)

## 2021-01-26 NOTE — PROGRESS NOTES
Nena is a 60 year old who is being evaluated via a billable telephone visit.      What phone number would you like to be contacted at? [phone]    How would you like to obtain your AVS? Mail a copy   [facility]   Pain Management Center      Phone call duration: 15 minutes    [facility]    Date of visit: 1/26/2021     Chief complaint:   Chief Complaint   Patient presents with     Pain      Interval history:  Nena Tang was last seen by me on 12/22/20      Recommendations/plan at the last visit included:  1. Physical Therapy: order placed  2. Pain Psychologist to address issues of relaxation, behavioral change, coping style, and other factors important to improvement: patient agreeable to try to participate with pain psych.- order placed  3. Diagnostic Studies: none  4. Urine toxicology screen: none   5. Medication Management:   1. Further titration of gabapentin may be helpful, but she is concerned about safety.  Lyrica is an option, but can cause LE swelling, which she already deals with.    2. Will prescribe Topamax, instructions given. Recommended to first start Methocarbamol, as this medication has been approved and them may add Topamax, once tolerating new medication  3. I would not recommend opioids for widespread pain  4. Robaxin (methocarbamol) approved. She will start that first  6. Further procedures recommended: Possible b/l SI joint, once widespread pain is better controlled  7. Other treatments: none  8. Recommendations/follow-up for PCP:  none  9. Follow up: 4-5 weeks following pain psych and pain PT follow-ups      Since her last visit, Nena Tang reports:  -tried Robaxin (methocarbamol) - not helpful.  She took 2 tabs.  -topamax 50mg BID.  No side effects, not feeling better.  -no other changes    Pain scores:  Pain intensity on average is 7 on a scale of 0-10.     Current pain medications include:  - Gabapentin 300 mg at  bedtime -  sedation so concerned about adding in daytime doses.  -topamax 50mg BID  Robaxin (methocarbamol) 1000mg dose- not helpful    Previous medication treatments included:  - Tylenol 650 mg prn - no benefit  - Diclofenac gel - does not take   - Flexeril - no benefit    Other treatments have included:  Nena Tang has not been seen at a pain clinic in the past.    PT: yes, last session 6/2019 outpatient and most recent home PT a couple of months ago 3-4 sessions  Acupuncture: none  Chiropractor: none  TENs Unit: none  Injections: shoulder injection does not remember when - no benefit    Side Effects: none    Medications:  Current Outpatient Medications   Medication Sig Dispense Refill     aspirin (ASA) 81 MG EC tablet TAKE 1 TABLET (81MG) BY MOUTH DAILY 90 tablet 3     clonazePAM (KLONOPIN) 0.5 MG tablet Take 1 tablet (0.5 mg) by mouth At Bedtime 30 tablet 3     gabapentin (NEURONTIN) 300 MG capsule Take 1 capsule (300 mg) by mouth At Bedtime 30 capsule 3     hydrOXYzine (VISTARIL) 25 MG capsule Take 1 capsule (25 mg) by mouth every 4 hours as needed for anxiety 60 capsule 3     methocarbamol (ROBAXIN) 500 MG tablet Take 1-2 tablets (500-1,000 mg) by mouth 3 times daily as needed for muscle spasms . Caution for sedation 180 tablet 1     topiramate (TOPAMAX) 25 MG tablet Take 25mg (1 tab) by mouth at bedtime.  Increase by 25mg (1 tab) every 7 days, to goal of 50mg (2 tabs) twice daily 120 tablet 1     traZODone (DESYREL) 100 MG tablet Take 1 tablet (100 mg) by mouth nightly as needed for sleep 30 tablet 3     TRULICITY 1.5 MG/0.5ML pen Inject 1.5 mg Subcutaneous once a week Every Friday 4 mL 3     acetaminophen (TYLENOL) 650 MG CR tablet Take 1 tablet (650 mg) by mouth every 8 hours as needed for mild pain or fever 120 tablet 1     alcohol swab prep pads Use to swab area of injection/rodolfo as directed. 100 each 3     amantadine (SYMMETREL) 100 MG capsule Take 1 capsule (100 mg)  in the morning and 2  capsules (200 mg) in the evening. 60 capsule 3     atorvastatin (LIPITOR) 80 MG tablet TAKE 1 TABLET (80MG) BY MOUTH DAILY 30 tablet 11     blood glucose (NO BRAND SPECIFIED) test strip Use to test blood sugar 4 times daily or as directed. 100 strip 11     Continuous Blood Gluc Sensor (FREESTYLE LEBRON 14 DAY SENSOR) [facility] 1 Device every 14 days Change sensor as directed every 14 days 2 each 11     diclofenac (VOLTAREN) 1 % topical gel Place 4 g onto the skin 4 times daily as needed for moderate pain       famotidine (PEPCID) 20 MG tablet Take 1 tablet (20 mg) by mouth daily 30 tablet 3     fluticasone-salmeterol (ADVAIR) 250-50 MCG/DOSE inhaler Inhale 1 puff into the lungs every 12 hours as needed       insulin aspart (NOVOLOG FLEXPEN) 100 UNIT/ML pen Inject 24 units under the skin 3 times daily before meals as directed. Take an extra 10 units with sugary foods. 15 mL 1     insulin glargine (LANTUS PEN) 100 UNIT/ML pen Inject 60 Units Subcutaneous every morning       insulin pen needle (NOVOFINE 30) 30G X 8 MM miscellaneous USE 4 DAILY OR AS DIRECTED 400 each 0     ipratropium - albuterol 0.5 mg/2.5 mg/3 mL (DUONEB) 0.5-2.5 (3) MG/3ML neb solution Take 1 vial (3 mLs) by nebulization every 6 hours as needed for shortness of breath / dyspnea or wheezing 30 vial 0     losartan (COZAAR) 100 MG tablet Take 1 tablet (100 mg) by mouth daily 30 tablet 3     metoprolol succinate ER (TOPROL-XL) 25 MG 24 hr tablet Take 2 tablets (50 mg) by mouth daily 180 tablet 3     miconazole (MICATIN/MICRO GUARD) 2 % external powder Apply topically once as needed       NOVOLOG FLEXPEN 100 UNIT/ML soln Inject 24 Units Subcutaneous 3 times daily (with meals) And an extra 10 units with sugary foods 15 mL 1     nystatin (MYCOSTATIN) 659132 UNIT/GM external cream Apply topically 2 times daily 30 g 3     order for DME Equipment being ordered: Depends. 30 each 4     order for DME Equipment being ordered: CPAP Supplies. 1 each 0     paliperidone ER  (INVEGA) 9 MG 24 hr tablet Take 1 tablet (9 mg) by mouth At Bedtime 60 tablet 1     senna-docusate (SENOKOT-S/PERICOLACE) 8.6-50 MG tablet Take 1 tablet by mouth 2 times daily as needed for constipation 60 tablet 10     sertraline (ZOLOFT) 100 MG tablet TAKE 2 TABLETS (200 MG) BY MOUTH DAILY 60 tablet 3     thin (NO BRAND SPECIFIED) lancets Use with lanceting device. To accompany: Blood Glucose Monitor Brands: per insurance. 100 each 6     zinc oxide (DESITIN) 40 % external ointment Apply topically as needed for dry skin or irritation 56 g 0       Medical History: any changes in medical history since they were last seen? No      Assessment:   1.  Widespread pain, she does meet 2010 fibromyalgia diagnostic criteria. Patient continues to endorse generalized tenderness, soreness and pain to low back, upper and lower extremities and shoulders. Currently on gabapentin, however unable to tolerate sedation SE and therefore only taking it at night. Would benefit from something less sedating.  2. Chronic low back pain- difficult to assess single underlying cause (MRI looks good) given widespread pain- likely SI joint or facet pain if spine source  3. Deconditioning      Plan:  1. Physical Therapy: continue  2. Pain Psychologist to address issues of relaxation, behavioral change, coping style, and other factors important to improvement: patient agreeable to try to participate with pain psych.- yes  3. Diagnostic Studies: none  4. Urine toxicology screen: none   5. Medication Management:   1. Titration of topamax provided to 100mg BID  6. Further procedures recommended: Possible b/l SI joint, once widespread pain is better controlled  7. Other treatments: none  8. Recommendations/follow-up for PCP:  none  9. Follow up: 6-8 weeks    Shell Paz MD  [facility]

## 2021-01-26 NOTE — PATIENT INSTRUCTIONS
1. Increase your topamax to:   50mg in the am and 100mg at night x 1 week, then  100mg twice daily    I put in new 50mg tabs.  100mg= 2 of the 50mg tabs.    2. Follow up in 6-8 weeks    ----------------------------------------------------------------  Clinic Number:  [phone]     Call with any questions about your care and for scheduling assistance.     Calls are returned Monday through Friday between 8 AM and 4:30 PM. We usually get back to you within 2 business days depending on the issue/request.    If we are prescribing your medications:    For opioid medication refills, call the clinic or send a [employer] message 7 days in advance.  Please include:    Name of requested medication    Name of the pharmacy.    For non-opioid medications, call your pharmacy directly to request a refill. Please allow 3-4 days to be processed.     Per [place] State Law:    All controlled substance prescriptions must be filled within 30 days of being written.      For those controlled substances allowing refills, pickup must occur within 30 days of last fill.      We believe regular attendance is key to your success in our program!      Any time you are unable to keep your appointment we ask that you call us at least 24 hours in advance to cancel.This will allow us to offer the appointment time to another patient.     Multiple missed appointments may lead to dismissal from the clinic.

## 2021-01-26 NOTE — PROGRESS NOTES
[facility] - Integrated Primary Care   January 26, 2021    Behavioral Health Clinician Progress Note    Patient Name: Nena Tang           Service Type:  Individual      Service Location:   Face to Face in Clinic     Session Start Time: 2:05pm  Session End Time:  2:30pm      Session Length: 16 - 37      Attendees: Patient    Visit Activities (Refresh list every visit): [facility] Only     Diagnostic Assessment Date: 1-23-18 Updated DA completed December 12, 2019  Treatment Plan Review Date: 2-  CGI date completed: 11-    See Flowsheets for today's PHQ-9 and TAMIA-7 results  Previous PHQ-9:   PHQ-9 SCORE 10/26/2018 5/7/2019 12/1/2020   PHQ-9 Total Score - - -   PHQ-9 Total Score - - -   PHQ-9 Total Score 20 22 13     Previous TAMIA-7:   TAMIA-7 SCORE 2/3/2017 3/13/2018 10/26/2018   Total Score - - -   Total Score 0 17 19   Total Score - - -       ALEXANDRA LEVEL:  ALEXANDRA Score (Last Two) 8/30/2011 6/9/2014   ALEXANDRA Raw Score 42 37   Activation Score 66 49.9   ALEXANDRA Level 3 2       DATA  Extended Session (60+ minutes): No  Interactive Complexity: No  Crisis: No  [facility] Patient: No    Treatment Objective(s) Addressed in This Session:  Target Behavior(s): disease management/lifestyle changes mental health    Depressed Mood: Increase interest, engagement, and pleasure in doing things  Decrease frequency and intensity of feeling down, depressed, hopeless  Improve quantity and quality of night time sleep / decrease daytime naps  Identify negative self-talk and behaviors: challenge core beliefs, myths, and actions  Improve concentration, focus, and mindfulness in daily activities   Feel less fidgety, restless or slow in daily activities / interpersonal interactions  Anxiety: will experience a reduction in anxiety and will develop more effective coping skills to manage anxiety symptoms  Thought Disturbance: will develop skills to more effectively manage symptoms, will develop more effective support systems and will continue to  take medications as prescribed  PTSD Symptom Management  Psychological distress related to Diabetes  Psychological distress related to Chronic Disease Management    Current Stressors / Issues:    The patient stated that she went to the casino and her daughter in law had a medial issues and the ambulance came and they left the casino as they were worried about her and they left-and they learned latter that she is okay-scared for the daughter in law and scared of how it will impact her if she dies-she thought about having a drink on her birthday and her sister told her no-is like taking sugar from a kids and re introducing the kid to sugar again just restarts the addiction-play the tape through-she stated that her mood has been good-she stated that she is more at peace today-she has been sleeping really good and she has not been having visual hallucinations-she stated that she needs to get her machine cleaned so that she can use her CAP machine-waking during the night-she stated that her blood sugars have been good and that she has been taking her shots at night-her son checks in on her very other day and this feels good-she is planning to stay at her son's house while he is out of town to watch grand daughters-she made a promise to the self to manage her diabetes better under 200 but just about 2 over 200 when she forget to take her shot-she is starting a program on Thursday for drop in center to socialize with others-she stated that her husbands niece called her and she had not talked to her since her  -she talked about the need to let go of his ashes.         Progress on Treatment Objective(s) / Homework:  Minimal progress - ACTION (Actively working towards change); Intervened by reinforcing change plan / affirming steps taken    Motivational Interviewing    MI Intervention: Expressed Empathy/Understanding, Supported Autonomy, Collaboration, Evocation, Permission to raise concern or advise, Open-ended  questions, Reflections: simple and complex, Rolled with resistance: Emphasized patient autonomy, Simple reflection and Evoked patient agenda and Change talk (evoked)     Change Talk Expressed by the Patient: Desire to change Ability to change Reasons to change Need to change Committment to change Activation Taking steps    Provider Response to Change Talk: E - Evoked more info from patient about behavior change, A - Affirmed patient's thoughts, decisions, or attempts at behavior change, R - Reflected patient's change talk and S - Summarized patient's change talk statements      Care Plan review completed: No    Medication Review:  No changes to current psychiatric medication(s)     Medication Compliance:  Yes    Changes in Health Issues:   Yes: please see medical note by PCP     Chemical Use Review:   Substance Use: Chemical use reviewed, no active concerns identified      Tobacco Use: No current tobacco use.      Assessment: Current Emotional / Mental Status (status of significant symptoms):  Risk status (Self / Other harm or suicidal ideation)  Patient has had a history of suicidal ideation: yes  Patient denies current fears or concerns for personal safety.  Patient denies current or recent suicidal ideation or behaviors.  Patient denies current or recent homicidal ideation or behaviors.  Patient denies current or recent self injurious behavior or ideation.  Patient denies other safety concerns.  A safety and risk management plan has not been developed at this time, however patient was encouraged to call [place] [phone] should there be a change in any of these risk factors.    Appearance:   Appropriate   Eye Contact:   Good   Psychomotor Behavior: Normal   Attitude:   Cooperative   Orientation:   All  Speech   Rate / Production: Normal    Volume:  Normal   Mood:    Anxious  Normal  Affect:    Appropriate   Thought Content:  Clear   Thought Form:  Coherent  Goal Directed  Logical   Insight:    Fair      Diagnoses:  1. Schizoaffective disorder, bipolar type (H)    2. Posttraumatic stress disorder        Collateral Reports Completed:  Not Applicable    Plan: (Homework, other):  Patient was given information about behavioral services and encouraged to schedule a follow up appointment with the clinic [facility] as needed to work on helping the patient with management of mood states and to work on stress management around her grieving process-also to help the patient with her behavioral needs to comply with treatment medical recommendations.  She was also given information about mental health symptoms and treatment options .  CD Recommendations: Maintain Sobriety.    Richardson Lopez, [facility], [facility]                                                    Treatment Plan    Client's Name: Nena Tang  YOB: 1960    Date: Reviewed/updated on 11/17/2020    DSM5 Diagnoses: (Sustained by DSM5 Criteria Listed Above)  Diagnoses:  295.70 (F25.0), Schizoaffective disorder, depressive type; 309.81 (F43.10) Posttraumatic Stress Disorder with panic specifier, history of chemical dependency issues (polysubstance dependence)  Psychosocial & Contextual Factors: Academics / Education - yes  Activities of Daily Living - yes  Financial management yes  Follow through with Medical recommendations - yes  Occupational / Vocational - yes  Social / Relational - yes, grief and loss  WHODAS Score: 30      Referral / Collaboration:  Referral to another professional/service is not indicated at this time..    Anticipated number of session or this episode of care: 20      MeasurableTreatment Goal(s) related to diagnosis / functional impairment(s)  Goal 1: Client will improve her ability to manage anxiety and mood states.     I will know I've met my goal when the man does not paralyze the me with fear.     Objective #A (Client Action)    Client will increase understanding of steps in the grief process.  Status: Continued - Date(s):  11/17/2020 the  patient reported that she has been dealing with her grief in a good way-she has been spending time with her family and has some activities that she enjoys.    Intervention(s)  Therapist will teach emotional recognition/identification. teach the navigation of grieving encouraging acceptance and adjustment.    Objective #B  Client will Decrease frequency and intensity of feeling down, depressed, hopeless  Decrease thoughts that you'd be better off dead or of suicide / self-harm.  Status: Continued - Date(s): 11/17/2020 the patient has been more active spending time with her son-she has been sleeping good-she stated that she has been sleeping late-no thoughts of suicide-she has been doing a lot of walking-she has improved her ability manage her health issues.    Intervention(s)  Therapist will teach emotional regulation skills. with the use of mindful coping strategies and open communication of feelings and thoughts (getting it out to another person)    Client has reviewed and agreed to the above plan.      Richardson Lopez, [facility]  11/17/2020        ______________________________________________________________________

## 2021-01-27 ENCOUNTER — TELEPHONE (OUTPATIENT)
Dept: PALLIATIVE MEDICINE | Facility: CLINIC | Age: 61
End: 2021-01-27

## 2021-01-27 DIAGNOSIS — M79.7 FIBROMYALGIA: ICD-10-CM

## 2021-01-27 DIAGNOSIS — I25.119 CORONARY ARTERY DISEASE INVOLVING NATIVE HEART WITH ANGINA PECTORIS, UNSPECIFIED VESSEL OR LESION TYPE (H): ICD-10-CM

## 2021-01-27 RX ORDER — TOPIRAMATE 50 MG/1
TABLET, FILM COATED ORAL
Qty: 120 TABLET | Refills: 3 | Status: SHIPPED | OUTPATIENT
Start: 2021-01-27 | End: 2021-02-02

## 2021-01-27 RX ORDER — LOSARTAN POTASSIUM 100 MG/1
TABLET ORAL
Qty: 90 TABLET | Refills: 0 | Status: SHIPPED | OUTPATIENT
Start: 2021-01-27 | End: 2021-05-28

## 2021-01-27 NOTE — TELEPHONE ENCOUNTER
Pharmacist called inquiring about medication changes that may have been made at yesterdays office visit. Let him know that the patient was just seen so I need to clarify the plan. Her medication are administered by nursing so changes will not be made until the orders are updated.        [id number] - [place], [place] - [facility] [address]  Phone: [phone] Fax: [phone]

## 2021-01-27 NOTE — TELEPHONE ENCOUNTER
Signed Prescriptions:                        Disp   Refills    losartan (COZAAR) 100 MG tablet            90 tab*0        Sig: TAKE 1 TABLET (100MG) BY MOUTH DAILY  Authorizing Provider: VERNON BROWNING  Ordering User: YONY VILLANUEVA RN  [facility]

## 2021-01-28 DIAGNOSIS — I25.119 CORONARY ARTERY DISEASE INVOLVING NATIVE HEART WITH ANGINA PECTORIS, UNSPECIFIED VESSEL OR LESION TYPE (H): ICD-10-CM

## 2021-01-28 RX ORDER — LOSARTAN POTASSIUM 100 MG/1
100 TABLET ORAL DAILY
Qty: 90 TABLET | Refills: 0 | Status: CANCELLED | OUTPATIENT
Start: 2021-01-28

## 2021-01-29 ENCOUNTER — MEDICAL CORRESPONDENCE (OUTPATIENT)
Dept: HEALTH INFORMATION MANAGEMENT | Facility: CLINIC | Age: 61
End: 2021-01-29

## 2021-02-02 ENCOUNTER — OFFICE VISIT (OUTPATIENT)
Dept: PALLIATIVE MEDICINE | Facility: CLINIC | Age: 61
End: 2021-02-02
Payer: COMMERCIAL

## 2021-02-02 ENCOUNTER — OFFICE VISIT (OUTPATIENT)
Dept: FAMILY MEDICINE | Facility: CLINIC | Age: 61
End: 2021-02-02
Payer: COMMERCIAL

## 2021-02-02 ENCOUNTER — APPOINTMENT (OUTPATIENT)
Dept: LAB | Facility: CLINIC | Age: 61
End: 2021-02-02
Payer: COMMERCIAL

## 2021-02-02 ENCOUNTER — OFFICE VISIT (OUTPATIENT)
Dept: BEHAVIORAL HEALTH | Facility: CLINIC | Age: 61
End: 2021-02-02
Payer: COMMERCIAL

## 2021-02-02 ENCOUNTER — OFFICE VISIT (OUTPATIENT)
Dept: PHARMACY | Facility: CLINIC | Age: 61
End: 2021-02-02
Payer: COMMERCIAL

## 2021-02-02 VITALS
HEIGHT: 60 IN | SYSTOLIC BLOOD PRESSURE: 108 MMHG | BODY MASS INDEX: 43.45 KG/M2 | WEIGHT: 221.31 LBS | OXYGEN SATURATION: 96 % | DIASTOLIC BLOOD PRESSURE: 66 MMHG | HEART RATE: 98 BPM | RESPIRATION RATE: 16 BRPM | TEMPERATURE: 97.8 F

## 2021-02-02 VITALS
DIASTOLIC BLOOD PRESSURE: 66 MMHG | BODY MASS INDEX: 43.45 KG/M2 | OXYGEN SATURATION: 96 % | HEIGHT: 60 IN | TEMPERATURE: 97.8 F | HEART RATE: 98 BPM | WEIGHT: 221.31 LBS | SYSTOLIC BLOOD PRESSURE: 108 MMHG | RESPIRATION RATE: 16 BRPM

## 2021-02-02 DIAGNOSIS — G89.4 CHRONIC PAIN SYNDROME: Primary | ICD-10-CM

## 2021-02-02 DIAGNOSIS — I10 HTN, GOAL BELOW 140/90: ICD-10-CM

## 2021-02-02 DIAGNOSIS — M79.7 FIBROMYALGIA: ICD-10-CM

## 2021-02-02 DIAGNOSIS — E11.3299 TYPE 2 DIABETES MELLITUS WITH MILD NONPROLIFERATIVE RETINOPATHY WITHOUT MACULAR EDEMA, WITH LONG-TERM CURRENT USE OF INSULIN, UNSPECIFIED LATERALITY (H): ICD-10-CM

## 2021-02-02 DIAGNOSIS — G89.29 CHRONIC BILATERAL LOW BACK PAIN WITHOUT SCIATICA: ICD-10-CM

## 2021-02-02 DIAGNOSIS — F51.04 PSYCHOPHYSIOLOGICAL INSOMNIA: ICD-10-CM

## 2021-02-02 DIAGNOSIS — N39.46 MIXED INCONTINENCE: ICD-10-CM

## 2021-02-02 DIAGNOSIS — E11.65 TYPE 2 DIABETES MELLITUS WITH HYPERGLYCEMIA, WITHOUT LONG-TERM CURRENT USE OF INSULIN (H): ICD-10-CM

## 2021-02-02 DIAGNOSIS — Z79.4 TYPE 2 DIABETES MELLITUS WITH MILD NONPROLIFERATIVE RETINOPATHY WITHOUT MACULAR EDEMA, WITH LONG-TERM CURRENT USE OF INSULIN, UNSPECIFIED LATERALITY (H): Primary | ICD-10-CM

## 2021-02-02 DIAGNOSIS — F43.10 POSTTRAUMATIC STRESS DISORDER: ICD-10-CM

## 2021-02-02 DIAGNOSIS — F25.1 SCHIZOAFFECTIVE DISORDER, DEPRESSIVE TYPE (H): ICD-10-CM

## 2021-02-02 DIAGNOSIS — Z79.4 TYPE 2 DIABETES MELLITUS WITH MILD NONPROLIFERATIVE RETINOPATHY WITHOUT MACULAR EDEMA, WITH LONG-TERM CURRENT USE OF INSULIN, UNSPECIFIED LATERALITY (H): ICD-10-CM

## 2021-02-02 DIAGNOSIS — E11.3299 TYPE 2 DIABETES MELLITUS WITH MILD NONPROLIFERATIVE RETINOPATHY WITHOUT MACULAR EDEMA, WITH LONG-TERM CURRENT USE OF INSULIN, UNSPECIFIED LATERALITY (H): Primary | ICD-10-CM

## 2021-02-02 DIAGNOSIS — F25.0 SCHIZOAFFECTIVE DISORDER, BIPOLAR TYPE (H): Primary | ICD-10-CM

## 2021-02-02 DIAGNOSIS — M54.50 CHRONIC BILATERAL LOW BACK PAIN WITHOUT SCIATICA: ICD-10-CM

## 2021-02-02 LAB
AMPHETAMINES UR QL: NOT DETECTED NG/ML
BARBITURATES UR QL SCN: NOT DETECTED NG/ML
BENZODIAZ UR QL SCN: NOT DETECTED NG/ML
BUPRENORPHINE UR QL: NOT DETECTED NG/ML
CANNABINOIDS UR QL: NOT DETECTED NG/ML
COCAINE UR QL SCN: NOT DETECTED NG/ML
D-METHAMPHET UR QL: NOT DETECTED NG/ML
METHADONE UR QL SCN: NOT DETECTED NG/ML
OPIATES UR QL SCN: NOT DETECTED NG/ML
OXYCODONE UR QL SCN: NOT DETECTED NG/ML
PCP UR QL SCN: NOT DETECTED NG/ML
PROPOXYPH UR QL: NOT DETECTED NG/ML
TRICYCLICS UR QL SCN: NOT DETECTED NG/ML

## 2021-02-02 PROCEDURE — 99606 MTMS BY PHARM EST 15 MIN: CPT | Performed by: PHARMACIST

## 2021-02-02 PROCEDURE — 97535 SELF CARE MNGMENT TRAINING: CPT | Mod: GP | Performed by: PHYSICAL THERAPIST

## 2021-02-02 PROCEDURE — 97110 THERAPEUTIC EXERCISES: CPT | Mod: GP | Performed by: PHYSICAL THERAPIST

## 2021-02-02 PROCEDURE — 90834 PSYTX W PT 45 MINUTES: CPT | Performed by: SOCIAL WORKER

## 2021-02-02 PROCEDURE — 80306 DRUG TEST PRSMV INSTRMNT: CPT | Performed by: NURSE PRACTITIONER

## 2021-02-02 PROCEDURE — 99607 MTMS BY PHARM ADDL 15 MIN: CPT | Performed by: PHARMACIST

## 2021-02-02 PROCEDURE — 99214 OFFICE O/P EST MOD 30 MIN: CPT | Performed by: NURSE PRACTITIONER

## 2021-02-02 RX ORDER — TRAZODONE HYDROCHLORIDE 150 MG/1
150 TABLET ORAL
Qty: 60 TABLET | Refills: 1 | Status: SHIPPED | OUTPATIENT
Start: 2021-02-02 | End: 2021-02-02

## 2021-02-02 RX ORDER — TRAZODONE HYDROCHLORIDE 100 MG/1
100 TABLET ORAL
Qty: 30 TABLET | Refills: 3 | Status: SHIPPED | OUTPATIENT
Start: 2021-02-02 | End: 2021-07-06

## 2021-02-02 RX ORDER — TOPIRAMATE 50 MG/1
TABLET, FILM COATED ORAL
Qty: 120 TABLET | Refills: 3 | Status: SHIPPED | OUTPATIENT
Start: 2021-02-02 | End: 2021-03-30

## 2021-02-02 RX ORDER — FLASH GLUCOSE SENSOR
1 KIT MISCELLANEOUS
Qty: 0.1 EACH | Refills: 0 | OUTPATIENT
Start: 2021-02-02

## 2021-02-02 ASSESSMENT — MIFFLIN-ST. JEOR
SCORE: 1495.37
SCORE: 1495.37

## 2021-02-02 NOTE — TELEPHONE ENCOUNTER
I'm sorry I missed this message at the end of the week.    I put in new script.  Please inform pharmacy    Signed Prescriptions:                        Disp   Refills    topiramate (TOPAMAX) 50 MG tablet          120 ta*3        Sig: Take 50mg (1 tab) by mouth in the am and 100mg (2           tabs) in the pm x 1 week, then increase to 100mg           (2 tabs) twice daily.  Authorizing Provider: CODEY VACA MD  [facility]

## 2021-02-02 NOTE — PROGRESS NOTES
[facility] - Integrated Primary Care   February 2, 2021    Behavioral Health Clinician Progress Note    Patient Name: Nena Tang           Service Type:  Individual      Service Location:   Face to Face in Clinic     Session Start Time: 1:35pm  Session End Time:  2:15pm      Session Length: 38 - 52      Attendees: Patient    Visit Activities (Refresh list every visit): [facility]     Diagnostic Assessment Date: 1-23-18 Updated DA completed December 12, 2019  Treatment Plan Review Date: 5-2-2021  CGI date completed: 11-    See Flowsheets for today's PHQ-9 and TAMIA-7 results  Previous PHQ-9:   PHQ-9 SCORE 10/26/2018 5/7/2019 12/1/2020   PHQ-9 Total Score - - -   PHQ-9 Total Score - - -   PHQ-9 Total Score 20 22 13     Previous TAMIA-7:   TAMIA-7 SCORE 2/3/2017 3/13/2018 10/26/2018   Total Score - - -   Total Score 0 17 19   Total Score - - -       ALEXANDRA LEVEL:  ALEXANDRA Score (Last Two) 8/30/2011 6/9/2014   ALEXANDRA Raw Score 42 37   Activation Score 66 49.9   ALEXANDRA Level 3 2       DATA  Extended Session (60+ minutes): No  Interactive Complexity: No  Crisis: No  [facility] Patient: No    Treatment Objective(s) Addressed in This Session:  Target Behavior(s): disease management/lifestyle changes mental health    Depressed Mood: Increase interest, engagement, and pleasure in doing things  Decrease frequency and intensity of feeling down, depressed, hopeless  Improve quantity and quality of night time sleep / decrease daytime naps  Identify negative self-talk and behaviors: challenge core beliefs, myths, and actions  Improve concentration, focus, and mindfulness in daily activities   Feel less fidgety, restless or slow in daily activities / interpersonal interactions  Anxiety: will experience a reduction in anxiety and will develop more effective coping skills to manage anxiety symptoms  Thought Disturbance: will develop skills to more effectively manage symptoms, will develop more effective support systems and will continue  to take medications as prescribed  PTSD Symptom Management  Psychological distress related to Diabetes  Psychological distress related to Chronic Disease Management    Current Stressors / Issues:    The patient was seen by [facility] per the request of the PCP-she stated that her sister in law is doing well from the medical issues-she asked if she can have a drink when she goes to the [place]-she stated that she is having difficulty falling asleep and she is waking during the night and when she wakes it is hard for her to go back to sleep-she is going to the drop in center and she met some nice people there-she goes 3 days a week and they do exercises at the program-talked about sleep hygiene for the patient around when she wakes up-the patient was open and honest about her improved mental health and that she is to celebrate her birthday today with her family and friend-she agreed that she would not drink alcohol today.        Progress on Treatment Objective(s) / Homework:  Minimal progress - ACTION (Actively working towards change); Intervened by reinforcing change plan / affirming steps taken    Motivational Interviewing    MI Intervention: Expressed Empathy/Understanding, Supported Autonomy, Collaboration, Evocation, Permission to raise concern or advise, Open-ended questions, Reflections: simple and complex, Rolled with resistance: Emphasized patient autonomy, Simple reflection and Evoked patient agenda and Change talk (evoked)     Change Talk Expressed by the Patient: Desire to change Ability to change Reasons to change Need to change Committment to change Activation Taking steps    Provider Response to Change Talk: E - Evoked more info from patient about behavior change, A - Affirmed patient's thoughts, decisions, or attempts at behavior change, R - Reflected patient's change talk and S - Summarized patient's change talk statements      Care Plan review completed: No    Medication Review:  No changes to current  psychiatric medication(s)     Medication Compliance:  Yes    Changes in Health Issues:   Yes: please see medical note by PCP     Chemical Use Review:   Substance Use: Chemical use reviewed, no active concerns identified      Tobacco Use: No current tobacco use.      Assessment: Current Emotional / Mental Status (status of significant symptoms):  Risk status (Self / Other harm or suicidal ideation)  Patient has had a history of suicidal ideation: yes  Patient denies current fears or concerns for personal safety.  Patient denies current or recent suicidal ideation or behaviors.  Patient denies current or recent homicidal ideation or behaviors.  Patient denies current or recent self injurious behavior or ideation.  Patient denies other safety concerns.  A safety and risk management plan has not been developed at this time, however patient was encouraged to call [place] [phone] should there be a change in any of these risk factors.    Appearance:   Appropriate   Eye Contact:   Good   Psychomotor Behavior: Normal   Attitude:   Cooperative   Orientation:   All  Speech   Rate / Production: Normal    Volume:  Normal   Mood:    Anxious  Normal  Affect:    Appropriate   Thought Content:  Clear   Thought Form:  Coherent  Goal Directed  Logical   Insight:    Fair     Diagnoses:  1. Schizoaffective disorder, bipolar type (H)    2. Posttraumatic stress disorder        Collateral Reports Completed:  Not Applicable    Plan: (Homework, other):  Patient was given information about behavioral services and encouraged to schedule a follow up appointment with the clinic [facility] as needed to work on helping the patient with management of mood states and to work on stress management around her grieving process-also to help the patient with her behavioral needs to comply with treatment medical recommendations.  She was also given information about mental health symptoms and treatment options .  CD Recommendations: Maintain Sobriety.    Don  ARMOND Lopez, [facility]                                                    Treatment Plan    Client's Name: Nena Tang  YOB: 1960    Date: Reviewed/updated on 2/2/2021     DSM5 Diagnoses: (Sustained by DSM5 Criteria Listed Above)  Diagnoses:  295.70 (F25.0), Schizoaffective disorder, depressive type; 309.81 (F43.10) Posttraumatic Stress Disorder with panic specifier, history of chemical dependency issues (polysubstance dependence)  Psychosocial & Contextual Factors: Academics / Education - yes  Activities of Daily Living - yes  Financial management yes  Follow through with Medical recommendations - yes  Occupational / Vocational - yes  Social / Relational - yes, grief and loss  WHODAS Score: 30      Referral / Collaboration:  Referral to another professional/service is not indicated at this time..    Anticipated number of session or this episode of care: 20      MeasurableTreatment Goal(s) related to diagnosis / functional impairment(s)  Goal 1: Client will improve her ability to manage anxiety and mood states.     I will know I've met my goal when the man does not paralyze the me with fear.     Objective #A (Client Action)    Client will increase understanding of steps in the grief process.  Status: Continued - Date(s):  2/2/2021 The patient stated that she is grieving the death of her mother, sister and -she stated that she is working towards being able to let go of the ashes of her  as she believes this will help her move through grieving.      Intervention(s)  Therapist will teach emotional recognition/identification. teach the navigation of grieving encouraging acceptance and adjustment.    Objective #B  Client will Decrease frequency and intensity of feeling down, depressed, hopeless  Decrease thoughts that you'd be better off dead or of suicide / self-harm.  Status: Continued - Date(s): 2/2/2021 The patient reported that she has been able to have good times and enjoy  activities-she struggles with sleeping as she is waking during the night and having hard time falling asleep-regarding her mood she stated that she has been feeling good and stable-no thoughts of suicide.    Intervention(s)  Therapist will teach emotional regulation skills. with the use of mindful coping strategies and open communication of feelings and thoughts (getting it out to another person)    Client has reviewed and agreed to the above plan.      Richardson Lopez, [facility]  2/2/2021       ______________________________________________________________________

## 2021-02-02 NOTE — PROGRESS NOTES
Medication Therapy Management (MTM) Encounter    ASSESSMENT:                            Medication Adherence/Access: No issues identified    Type 2 Diabetes: A1c not at goal <7%. AM glucose is highly variable, reflecting variability in her diet. Unclear if she received Freestyle Gabby glucometer and encouraged her to bring new glucometer to clinic for review of use - it would be helpful to monitor PM glucose. Now that she has improved use of PM insulin, will consider large and small Novolog doses to reduce hyperglycemia.     Schizoaffective: improved, continue [facility] support. Will monitor mood and sleep with dose changes in topiramate. Does not appear to have had any other changes to her medications that fit the timeline of worsened sleep the last two months (methocarbamol stopped more recently, amantadine increase 1 month ago, topiramate started 2 months ago but insomnia would be unusual). Encouraged restarting CPAP, see PCP notes.    Fibromyalgia: continue with titration of topiramate as ordered by pain specialist, encouraged PT and exercise.     Hypertension: stable    PLAN:                            No medication changes.    Follow-up: 1 month    SUBJECTIVE/OBJECTIVE:                          Nena Tang is a 61 year old female coming in for a follow-up visit. She was referred to me from Rowena Haas. Today's visit is a co-visit with PCP and Richardson Lopez [facility]. Today's visit is a follow-up MTM visit from 21.     Reason for visit: recheck.    Tobacco: She reports that she has never smoked. She has never used smokeless tobacco.  Alcohol: not currently using    Medication Adherence/Access:   Improved use of Novolog, reports missing PM doses 3 times since last appointment.  Medications are set up in a pill box by HCRN.    Type 2 Diabetes: Pt currently taking Lantus 60u daily, Novolog 24u BID (improved adherence), Trulicity 1.5mg weekly. Pt is not experiencing side effects.    SMB times daily. States she  received a new glucometer from the pharmacy but doesn't want to use it. Unsure of the brand.   Ranges (glucometer):   Date FBG/ 2hours post Lunch/2hours post Dinner /2hours post    2/2 279      1/31 210      1/30 129      1/29 314 (after coffee and donuts)      1/28 296      1/27 230      1/26 179         No hypoglycemia.  Recent symptoms of high blood sugar? thirsty, polyuria  Eye exam: up to date  Foot exam: up to date  ACEi/ARB: No.   Urine Albumin:   Lab Results   Component Value Date    MICROL 7 09/15/2020   Aspirin: Taking 81mg daily and denies side effects  Diet/Exercise: eating twice a day. Sugar in her coffee, no other sugary drinks.   Lab Results   Component Value Date    A1C 9.1 12/01/2020    A1C 9.7 09/15/2020    A1C 8.6 06/30/2020    A1C 6.2 12/03/2019    A1C 6.6 08/06/2019     Schizoaffective: Currently taking sertraline 200mg daily, Invega 9mg daily, amantadine 100mg BID, trazodone 100mg HS, clonazepam 0.5mg HS. She is not using CPAP.  Sleep has been more difficult this past month, wakes up more frequently to urinate. Every night is problematic. Sleeping from about 12am to 9:30am. Takes PM medications ~9pm, watches tv from 10-12. Trouble getting back to sleep after she wakes up overnight to urinate.  Reports her mood as good. Visual hallucination last night, saw her  and felt nice.   She has restarted going to a new drop in center.   Side effects: mouth movements have resolved with increase in amantadine. No daytime naps.  See [facility] notes for additional details.     Fibromyalgia: currently taking methocarbamol PRN - no longer set up in her pill box, doesn't think it's effective. Topiramate 50mg BID (hasn't increased yet as ordered by pain specialist, titrating to 100mg BID).   She is having more trouble sleeping lately, wonders if this is because of pain.   Working with pain clinic for medication management and PT.     Hypertension: Current medications include losartan 100mg daily, metoprolol XL  "50mg daily.  Patient has blood pressure monitored by HCRN. Reports values as \"good\".  Patient reports no current medication side effects.  BP Readings from Last 3 Encounters:   02/02/21 108/66   02/02/21 108/66   01/05/21 132/74        Today's Vitals: /66   Pulse 98   Temp 97.8  F (36.6  C) (Temporal)   Resp 16   Ht 5' (1.524 m)   Wt 221 lb 5 oz (100.4 kg)   LMP 01/06/2015   SpO2 96%   BMI 43.22 kg/m    ----------------    I spent 45 minutes with this patient today. All changes were made via collaborative practice agreement with Rowena Haas. A copy of the visit note was provided to the patient's primary care provider.    The patient was given a summary of these recommendations. See Provider note/AVS from today.     Eugenia De Jesus, PharmD, BCACP         Medication Therapy Recommendations  No medication therapy recommendations to display      "

## 2021-02-02 NOTE — PROGRESS NOTES
"Patient Name: Nena Tang      YOB: 1960     Medical Record Number: [id number]  Diagnosis:    Chronic pain syndrome  Fibromyalgia  Chronic bilateral low back pain without sciatica    Visit Info Length of Visit: 45 min  Therapeutic Procedures/Exercises: 25 min; Therapeutic Activities: NA; Neuromuscular Re-education: NA;  Self Care / Home Management Trainin min     Exercise/Activity Education Instruction:  Home Exercise Program:   - Hallway walk: with 4ww 2 long lengths of hallway 2'35\" x 3 (rest breaks in b/w)  - Walking program - encouraged pt to take a short walk on a regular basis - start with 2-3 min every other day, working toward 5x/wk.  Discussed having spectrum of choices to help with consistency - choose based on how she is feeling (walk in hallway with someone else, walk within her apt - use SEC with either);   - instructed in \"zipper\" (TA) abdominal set - set reminder cues to practice off and on throughout the day - apply to functional mobility  Self Care:   - instructed in bed mobility for sit <--> supine (pt requires min assist)  - use walker when going to [employer] - to help tolerate longer distances,  especially when pain is flared  - consider use of ice or heat when pain flares - educated in benefits; offered gel pack for hot/cold and pt again declined (had offered at initial visit)     Notes: This is initial f/u visit since PT angelito on 21  Patient reports:   - last few days right > left LB and left LE have been hurting \"real bad\" \"I don't have pain meds and they took away the mm relaxants b/c they weren't helping\"  - today is her bday and she plans to go to [employer] to play bingo    Activity tolerance: doing laundry was easier using the body mechanics ideas from last session; reports she requires assist for bed mobility - it is hard for her to get up without support - needs to get her sister's help in middle of night if she needs to get up and use bathroom    HEP/Self " "Care/Home Management Training:   Self Care: tried pillow supports in sitting at home and they were helpful; tried body mechanics ideas for laundry and they were helpful        Demonstrates/Verbalizes Technique: 3 (1= poor technique-difficulty performing exercises,significant cues required; 5= good technique-performs exercises without cues)  Body Awareness: 2 (1=low awareness; 5=high awareness)  Posture/Stability: 2 (1= poor posture, stability; 5= good posture, stability)   Motivational Level:   Cooperative Participation:  Full   Patient Satisfaction:  satisfied   Response to Teaching:   demonstrated ability and verbalized, recall/understanding  Factors that affect learning: None   Plan of Care  Cont PT to support reactivation and integration of self regulation pain management skills. (next visit consider: break down bed mobility as an exercise, check \"zipper\" (TA) abdominal set, stretches.)    Therapist: Pam eB, PT                  Date: 2/2/2021        "

## 2021-02-02 NOTE — PROGRESS NOTES
Assessment & Plan     Chronic pain syndrome  Patient reports increase in soreness- had PT right before this appointment. Patient has also been trying to walk more without her walker and doing some exercise [facility] the [facility] to strengthen her legs. Patient discontinued her methocarbamol because this was not helpful for her- this was confirmed by her HCRN.   - Continue with the Topiramate dose changes as discussed per pain clinic- Dr. Paz; and follow-up as planned.   - Drug Abuse Screen Panel 13, Urine (Pain Care Package)    Schizoaffective disorder, depressive type (H)  Posttraumatic stress disorder  Psychophysiological insomnia  Patient reports that her mood has been ok, but has been struggling with her sleep. Patient notes trouble falling asleep and also staying asleep through the night. Patient is taking her trazodone nightly- confirmed by HCRN.   - traZODone (DESYREL) 100 MG tablet; Take 1 tablet (100 mg) by mouth nightly as needed for sleep  - Will continue with current trazodone dose.   - Discussed working on cleaning her CPAP in order to start using it at night to help with her sleep disturbances.   - Work on sleep hygiene as discussed with Richardson and also follow-up with him in 2 weeks and work on sleep concerns.     Mixed incontinence  Ongoing, patient has missed 2 pelvic PT appointments and has no interest in rescheduling at the moment.   - Will continue with depends and use the bathroom as she needs to.     Type 2 diabetes mellitus with hyperglycemia, without long-term current use of insulin (H)  Uncontrolled. Continue with insulin as prescribed.   - Continue with increased activity and paying some attention to food choices.   Lab Results   Component Value Date    A1C 9.1 12/01/2020    A1C 9.7 09/15/2020    A1C 8.6 06/30/2020    A1C 6.2 12/03/2019    A1C 6.6 08/06/2019         Patient Instructions   - Clean and start using the CPAP machine.  - Work on improving the sleep routine to help with the  sleep.   - Good job with the insulin.   - Happy Birthday!!!   - Call clinic with any questions or concerns.         Return in about 4 weeks (around 3/2/2021) for Routine Visit.    ART Fay CNP [facility]: Richardson  [facility]: Eugenia Barrett is a 61 year old who presents to clinic today for the following health issues     HPI     Diabetes Follow-up  Patient reports that her blood sugars have been improving with her taking her evening dose of her insulin. Checking her fasting blood sugar in the morning after she wakes up. And sometimes checks her blood sugars in the afternoon.  Some improvements with the insulin use at night with her overall numbers. Encouraged patient to continue with this new routine and we can continue to evaluate with time.     How often are you checking your blood sugar? About twice per day.     What concerns do you have today about your diabetes? Improving.      Do you have any of these symptoms? (Select all that apply)  Excessive thirst;     Have you had a diabetic eye exam in the last 12 months? Yes- Date of last eye exam: 7/16/2020,  Location: [facility]    BP Readings from Last 2 Encounters:   02/02/21 108/66   02/02/21 108/66     Hemoglobin A1C (%)   Date Value   12/01/2020 9.1 (H)   09/15/2020 9.7 (H)     LDL Cholesterol Calculated (mg/dL)   Date Value   12/01/2020 141 (H)   12/22/2019 86       Hypertension Follow-up  Reports some headaches. Denies any chest pain, shortness of breath, heart palpitations, or dizziness.   BP Readings from Last 3 Encounters:   02/02/21 108/66   02/02/21 108/66   01/05/21 132/74       Do you check your blood pressure regularly outside of the clinic? Yes: weekly by the nurse.     Chronic Pain: Patient reports she stopped taking the muscle relaxant (methocarbamol) because it had not been helpful. Reports that she stopped taking it about a week ago. Will double check this with her nurse.     Mental  Health Follow up   Patient reports that she has her mother, sister and 's ashes with her in her room. Planning to go to the [employer] today for her birthday. Patient denies any suicidal thoughts. Trying to get past her losses.   Started going to the [employer] [email or web address] since last week. Has lunch and breakfast there: M,T and Thu. Exercising there and also enjoying her time at the [employer] in center.     Status since last visit: stable.     See PHQ-9 for current symptoms.  Other associated symptoms: None    Complicating factors: struggling with insomnia.   Significant life event:  No   Current substance abuse:  None  Anxiety or Panic symptoms:  No    Sleep - hard to fall asleep. Trouble falling back to sleep. Taking nightly trazodone?  Appetite - good.   Exercise - exercising at the [employer] [place].     Currently sober, advised her against any alcohol use with her current medications.     PHQ-9  English PHQ-9   Any Language             Problems taking medications regularly: No    Medication side effects: none    Diet: regular (no restrictions)    Social History     Tobacco Use     Smoking status: Never Smoker     Smokeless tobacco: Never Used   Substance Use Topics     Alcohol use: No     Frequency: Never     Comment: last month        Problem list and histories reviewed & adjusted, as indicated.  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 (H)     Illiterate     Irritable bowel syndrome     overweight - BMI >35     Takotsubo cardiomyopathy     CAD (coronary artery disease)     Restless legs syndrome (RLS)     CINDY (obstructive sleep apnea)- mild AHI 10.3     Verbal auditory hallucination     Chronic low back pain     Schizoaffective disorder, depressive type (H)     Migraine headache     HTN, goal below 140/90      Health Care Home     Lumbago     Cervicalgia     Cocaine abuse, episodic (H)     Suicidal ideation     Esophageal reflux     Mild nonproliferative diabetic retinopathy (H)     Tardive dyskinesia     Alcohol use     Left cataract     Falls frequently     History of uterine cancer     Psychophysiological insomnia     Dysuria     Asymptomatic postmenopausal status     Abdominal pain, right lower quadrant     Infectious encephalopathy     Non-intractable vomiting with nausea     Thoughts of self harm     Gastroenteritis     Posttraumatic stress disorder     Cervical cancer screening     Type 2 diabetes mellitus with stage 3 chronic kidney disease, with long-term current use of insulin (H)     Positive AIDA (antinuclear antibody)     Hyperglycemia     Pneumonia     Depression with suicidal ideation     Mixed incontinence     Chronic pain syndrome     Fibromyalgia     Past Surgical History:   Procedure Laterality Date     C OOPHORECTORMY FOR RAHEL, W/BX  1983    UTERINE     CATARACT IOL, RT/LT Bilateral 2017     COLONOSCOPY N/A 3/16/2017    Procedure: COLONOSCOPY;  Surgeon: Traci Gonzalez MD;  Location: [facility]     Coronary CTA  5/21/2014     HYSTERECTOMY  1983    uterine cancer yearly pap's per provider.     LAPAROSCOPIC CHOLECYSTECTOMY  2008     PHACOEMULSIFICATION CLEAR CORNEA WITH STANDARD INTRAOCULAR LENS IMPLANT Left 5/5/2017    Procedure: PHACOEMULSIFICATION CLEAR CORNEA WITH STANDARD INTRAOCULAR LENS IMPLANT;  LEFT EYE PHACOEMULSIFICATION CLEAR CORNEA WITH STANDARD INTRAOCULAR LENS IMPLANT ;  Surgeon: Tyra Diaz MD;  Location:  [facility]     PHACOEMULSIFICATION CLEAR CORNEA WITH STANDARD INTRAOCULAR LENS IMPLANT Right 6/30/2017    Procedure: PHACOEMULSIFICATION CLEAR CORNEA WITH STANDARD INTRAOCULAR LENS IMPLANT;  RIGHT EYE PHACOEMULSIFICATION CLEAR CORNEA WITH STANDARD INTRAOCULAR LENS IMPLANT;  Surgeon: Tyra Diaz MD;  Location: [facility]     RELEASE TRIGGER FINGER  10/11/2012    Left thumb.  Procedure: RELEASE TRIGGER FINGER;  LEFT THUMB TRIGGER RELEASE;  Surgeon: Tay Langley MD;  Location:  [facility]     RELEASE TRIGGER FINGER Right 2016    Procedure: RELEASE TRIGGER FINGER;  Surgeon: Albino Castañeda MD;  Location:  [facility]       Social History     Tobacco Use     Smoking status: Never Smoker     Smokeless tobacco: Never Used   Substance Use Topics     Alcohol use: No     Frequency: Never     Comment: last month     Family History   Problem Relation Age of Onset     Cancer Mother         BLADDER     Respiratory Mother         COPD     Gastrointestinal Disease Mother         CIRRHOSIS OF LI BOLIVAR     Alcohol/Drug Mother      Diabetes Mother      Hypertension Mother      Lipids Mother      C.A.D. Mother      Glaucoma Mother      Alcohol/Drug Sister      Mental Illness Sister      Alcohol/Drug Sister      Psychotic Disorder Sister      Cancer Maternal Grandmother         UNKNOWN TYPE     Cancer Brother         COLON     Cancer - colorectal Brother         IN HIS LATE 30S     Alcohol/Drug Brother          OF HEROIN OVERDOSE AT AGE 22 YRS     Macular Degeneration No family hx of            Current Outpatient Medications   Medication Sig Dispense Refill     acetaminophen (TYLENOL) 650 MG CR tablet Take 1 tablet (650 mg) by mouth every 8 hours as needed for mild pain or fever 120 tablet 1     alcohol swab prep pads Use to swab area of injection/rodolfo as directed. 100 each 3     amantadine (SYMMETREL) 100 MG capsule Take 1 capsule (100 mg)  in the morning and 2 capsules (200 mg) in the evening. 60 capsule 3     aspirin (ASA) 81 MG EC tablet TAKE 1 TABLET (81MG) BY MOUTH DAILY 90 tablet 3     atorvastatin (LIPITOR) 80 MG tablet TAKE 1 TABLET (80MG) BY MOUTH DAILY 30 tablet 11     blood glucose (NO BRAND SPECIFIED) test strip Use to test blood sugar 4 times daily or as directed. 100 strip 11     clonazePAM (KLONOPIN) 0.5 MG tablet Take 1 tablet (0.5 mg) by mouth At Bedtime 30 tablet 3     Continuous  Blood Gluc Sensor (FREESTYLE LEBRON 14 DAY SENSOR) [facility] 1 Device every 14 days Change sensor as directed every 14 days 2 each 11     diclofenac (VOLTAREN) 1 % topical gel Place 4 g onto the skin 4 times daily as needed for moderate pain       famotidine (PEPCID) 20 MG tablet Take 1 tablet (20 mg) by mouth daily 30 tablet 3     fluticasone-salmeterol (ADVAIR) 250-50 MCG/DOSE inhaler Inhale 1 puff into the lungs every 12 hours as needed       gabapentin (NEURONTIN) 300 MG capsule Take 1 capsule (300 mg) by mouth At Bedtime 30 capsule 3     hydrOXYzine (VISTARIL) 25 MG capsule Take 1 capsule (25 mg) by mouth every 4 hours as needed for anxiety 60 capsule 3     insulin aspart (NOVOLOG FLEXPEN) 100 UNIT/ML pen Inject 24 units under the skin 3 times daily before meals as directed. Take an extra 10 units with sugary foods. 15 mL 1     insulin glargine (LANTUS PEN) 100 UNIT/ML pen Inject 60 Units Subcutaneous every morning       insulin pen needle (NOVOFINE 30) 30G X 8 MM miscellaneous USE 4 DAILY OR AS DIRECTED 400 each 0     ipratropium - albuterol 0.5 mg/2.5 mg/3 mL (DUONEB) 0.5-2.5 (3) MG/3ML neb solution Take 1 vial (3 mLs) by nebulization every 6 hours as needed for shortness of breath / dyspnea or wheezing 30 vial 0     losartan (COZAAR) 100 MG tablet TAKE 1 TABLET (100MG) BY MOUTH DAILY 90 tablet 0     methocarbamol (ROBAXIN) 500 MG tablet Take 1-2 tablets (500-1,000 mg) by mouth 3 times daily as needed for muscle spasms . Caution for sedation 180 tablet 1     metoprolol succinate ER (TOPROL-XL) 25 MG 24 hr tablet Take 2 tablets (50 mg) by mouth daily 180 tablet 3     miconazole (MICATIN/MICRO GUARD) 2 % external powder Apply topically once as needed       NOVOLOG FLEXPEN 100 UNIT/ML soln Inject 24 Units Subcutaneous 3 times daily (with meals) And an extra 10 units with sugary foods 15 mL 1     nystatin (MYCOSTATIN) 465030 UNIT/GM external cream Apply topically 2 times daily 30 g 3     order for DME Equipment being  ordered: Depends. 30 each 4     order for DME Equipment being ordered: CPAP Supplies. 1 each 0     paliperidone ER (INVEGA) 9 MG 24 hr tablet Take 1 tablet (9 mg) by mouth At Bedtime 60 tablet 1     senna-docusate (SENOKOT-S/PERICOLACE) 8.6-50 MG tablet Take 1 tablet by mouth 2 times daily as needed for constipation 60 tablet 10     sertraline (ZOLOFT) 100 MG tablet TAKE 2 TABLETS (200 MG) BY MOUTH DAILY 60 tablet 3     thin (NO BRAND SPECIFIED) lancets Use with lanceting device. To accompany: Blood Glucose Monitor Brands: per insurance. 100 each 6     topiramate (TOPAMAX) 50 MG tablet Take 50mg (1 tab) by mouth in the am and 100mg (2 tabs) in the pm x 1 week, then increase to 100mg (2 tabs) twice daily. 120 tablet 3     topiramate (TOPAMAX) 50 MG tablet Take 50mg (1 tab) by mouth in the am and 100mg (2 tabs) in the pm x 1 week, then 100mg (2 tabs) twice daily. Note change in pill size. 120 tablet 3     traZODone (DESYREL) 100 MG tablet Take 1 tablet (100 mg) by mouth nightly as needed for sleep 30 tablet 3     TRULICITY 1.5 MG/0.5ML pen Inject 1.5 mg Subcutaneous once a week Every Friday 4 mL 3     zinc oxide (DESITIN) 40 % external ointment Apply topically as needed for dry skin or irritation 56 g 0     Allergies   Allergen Reactions     Imidazole Antifungals Hives     Tolerates diflucan     Ketoprofen Itching     Pruritis to topical     Lisinopril Hives     Metformin Other (See Comments)     Patient hospitalized for lactic acidosis - admitting provider suspectd caused by metformin     Metronidazole Hives     Posaconazole Hives     Tolerates diflucan     Recent Labs   Lab Test 12/01/20  1730 10/04/20  1224 09/15/20  1454 06/30/20  1624 12/22/19  0651 12/22/19  0651 08/06/19  1043 08/06/19  1043 05/24/19  0916 05/24/19  0916   A1C 9.1*  --  9.7* 8.6*  --   --    < > 6.6*  --   --    *  --   --   --   --  86  --  81  --   --    HDL 46*  --   --   --   --  42*  --  39*  --   --    TRIG 141  --   --   --   --   132  --  131  --   --    ALT  --  29 29 35   < > 22   < >  --    < > 30   CR  --  0.86 0.85 0.98   < > 0.86   < >  --    < > 0.87   GFRESTIMATED  --  73 74 63   < > 74   < >  --    < > 73   GFRESTBLACK  --  85 86 73   < > 85   < >  --    < > 85   POTASSIUM  --  4.2 4.4 4.3   < > 4.1   < >  --    < > 4.0   TSH  --   --   --   --   --  1.23  --   --   --  1.62    < > = values in this interval not displayed.      BP Readings from Last 3 Encounters:   02/02/21 108/66   02/02/21 108/66   01/05/21 132/74    Wt Readings from Last 3 Encounters:   02/02/21 100.4 kg (221 lb 5 oz)   02/02/21 100.4 kg (221 lb 5 oz)   01/05/21 101.2 kg (223 lb)          Labs reviewed in EPIC  Problem list, Medication list, Allergies, and Medical/Social/Surgical histories reviewed in [facility] and updated as appropriate.    ROS: Constitutional, neuro, ENT, endocrine, pulmonary, cardiac, gastrointestinal, genitourinary, musculoskeletal, integument and psychiatric systems are negative, except as otherwise noted above in the HPI.     OBJECTIVE:                                                    /66   Pulse 98   Temp 97.8  F (36.6  C) (Temporal)   Resp 16   Ht 1.524 m (5')   Wt 100.4 kg (221 lb 5 oz)   LMP 01/06/2015   SpO2 96%   BMI 43.22 kg/m    Body mass index is 43.22 kg/m .    GENERAL: healthy, alert and no distress  NECK: no adenopathy, no asymmetry, masses, or scars and thyroid normal to palpation  RESP: lungs clear to auscultation - no rales, rhonchi or wheezes  CV: regular rate and rhythm, normal S1 S2, no S3 or S4, no murmur, click or rub, no peripheral edema and peripheral pulses strong  ABDOMEN: soft, nontender and bowel sounds normal  MS: no gross musculoskeletal defects noted, no edema  NEURO: Normal strength and tone, mentation intact and speech normal  PSYCH: mentation appears normal    Mental Status Assessment:  Appearance:   Appropriate   Eye Contact:   Good   Psychomotor Behavior: Normal   Attitude:   Cooperative    Orientation:   All  Speech   Rate / Production: Normal    Volume:  Normal   Mood:    Normal  Affect:    Blunted   Thought Content:  Clear   Thought Form:  Coherent  Logical   Insight:    Fair   Attention Span and Concentration:  appropriate  Recent and Remote Memory:  intact  Fund of Knowledge: appropriate  Muscle Strength and Tone: normal   Suicidal Ideation: reports no thoughts, no intention    See [facility] notes     Diagnostic Test Results:  No results found for any visits on 02/02/21.

## 2021-02-02 NOTE — PATIENT INSTRUCTIONS
- Clean and start using the CPAP machine.  - Work on improving the sleep routine to help with the sleep.   - Good job with the insulin.   - Happy Birthday!!!   - Call clinic with any questions or concerns.

## 2021-02-03 NOTE — TELEPHONE ENCOUNTER
Requested Prescriptions   Pending Prescriptions Disp Refills     Continuous Blood Gluc Sensor (FREESTYLE LEBRON 14 DAY SENSOR) MISC 2 each 11     Si Device every 14 days Change sensor as directed every 14 days       There is no refill protocol information for this order         Refused Prescriptions:                       Disp   Refills    Continuous Blood Gluc Sensor (FREESTYLE LI*0.1 ea*0        Si Device every 14 days Change sensor as directed           every 14 days  Refused By: PATI GARCIA  Reason for Refusal: Duplicate    Transfer request - location sent to requesting pharmacy

## 2021-02-05 NOTE — TELEPHONE ENCOUNTER
Followed up with pharmacy, No other questions.    CYNTHIA ChavarriaN, RN  Care Coordinator  [facility] [place] [facility]

## 2021-02-11 DIAGNOSIS — M54.50 CHRONIC RIGHT-SIDED LOW BACK PAIN WITHOUT SCIATICA: ICD-10-CM

## 2021-02-11 DIAGNOSIS — F25.1 SCHIZOAFFECTIVE DISORDER, DEPRESSIVE TYPE (H): ICD-10-CM

## 2021-02-11 DIAGNOSIS — G89.29 CHRONIC RIGHT-SIDED LOW BACK PAIN WITHOUT SCIATICA: ICD-10-CM

## 2021-02-11 NOTE — TELEPHONE ENCOUNTER
Routing refill request to provider for review/approval because:  Drug not on the [facility] refill protocol   Krystyna YOST RN

## 2021-02-12 RX ORDER — GABAPENTIN 300 MG/1
CAPSULE ORAL
Qty: 30 CAPSULE | Refills: 3 | Status: SHIPPED | OUTPATIENT
Start: 2021-02-12 | End: 2021-06-15

## 2021-02-16 ENCOUNTER — OFFICE VISIT (OUTPATIENT)
Dept: BEHAVIORAL HEALTH | Facility: CLINIC | Age: 61
End: 2021-02-16
Payer: COMMERCIAL

## 2021-02-16 ENCOUNTER — OFFICE VISIT (OUTPATIENT)
Dept: PALLIATIVE MEDICINE | Facility: CLINIC | Age: 61
End: 2021-02-16
Payer: COMMERCIAL

## 2021-02-16 DIAGNOSIS — M54.50 CHRONIC BILATERAL LOW BACK PAIN WITHOUT SCIATICA: ICD-10-CM

## 2021-02-16 DIAGNOSIS — E11.3299 TYPE 2 DIABETES MELLITUS WITH MILD NONPROLIFERATIVE RETINOPATHY WITHOUT MACULAR EDEMA, WITH LONG-TERM CURRENT USE OF INSULIN, UNSPECIFIED LATERALITY (H): Primary | ICD-10-CM

## 2021-02-16 DIAGNOSIS — G89.4 CHRONIC PAIN SYNDROME: Primary | ICD-10-CM

## 2021-02-16 DIAGNOSIS — F25.1 SCHIZOAFFECTIVE DISORDER, DEPRESSIVE TYPE (H): ICD-10-CM

## 2021-02-16 DIAGNOSIS — F25.0 SCHIZOAFFECTIVE DISORDER, BIPOLAR TYPE (H): Primary | ICD-10-CM

## 2021-02-16 DIAGNOSIS — Z79.4 TYPE 2 DIABETES MELLITUS WITH MILD NONPROLIFERATIVE RETINOPATHY WITHOUT MACULAR EDEMA, WITH LONG-TERM CURRENT USE OF INSULIN, UNSPECIFIED LATERALITY (H): Primary | ICD-10-CM

## 2021-02-16 DIAGNOSIS — F43.10 POSTTRAUMATIC STRESS DISORDER: ICD-10-CM

## 2021-02-16 DIAGNOSIS — M79.7 FIBROMYALGIA: ICD-10-CM

## 2021-02-16 DIAGNOSIS — G89.29 CHRONIC BILATERAL LOW BACK PAIN WITHOUT SCIATICA: ICD-10-CM

## 2021-02-16 PROCEDURE — 97110 THERAPEUTIC EXERCISES: CPT | Mod: GP | Performed by: PHYSICAL THERAPIST

## 2021-02-16 PROCEDURE — 90832 PSYTX W PT 30 MINUTES: CPT | Mod: 95 | Performed by: SOCIAL WORKER

## 2021-02-16 PROCEDURE — 97535 SELF CARE MNGMENT TRAINING: CPT | Mod: GP | Performed by: PHYSICAL THERAPIST

## 2021-02-16 RX ORDER — INSULIN ASPART 100 [IU]/ML
INJECTION, SOLUTION INTRAVENOUS; SUBCUTANEOUS
Qty: 15 ML | Refills: 3 | Status: SHIPPED | OUTPATIENT
Start: 2021-02-16 | End: 2021-04-06

## 2021-02-16 NOTE — PROGRESS NOTES
[facility]   February 16, 2021    Behavioral Health Clinician Progress Note    Patient Name: Nena Tang           Service Type:  Individual      Service Location:   Face to Face in Clinic     Session Start Time: 10:05am  Session End Time:  10:30am      Session Length: 16 - 37      Attendees: Patient    Visit Activities (Refresh list every visit): [facility]     Diagnostic Assessment Date: 1-23-18 Updated DA completed December 12, 2019  Treatment Plan Review Date: 5-2-2021  CGI date completed: 11-    See Flowsheets for today's PHQ-9 and TAMIA-7 results  Previous PHQ-9:   PHQ-9 SCORE 10/26/2018 5/7/2019 12/1/2020   PHQ-9 Total Score - - -   PHQ-9 Total Score - - -   PHQ-9 Total Score 20 22 13     Previous TAMIA-7:   TAMIA-7 SCORE 2/3/2017 3/13/2018 10/26/2018   Total Score - - -   Total Score 0 17 19   Total Score - - -       ALEXANDRA LEVEL:  ALEXANDRA Score (Last Two) 8/30/2011 6/9/2014   ALEXANDRA Raw Score 42 37   Activation Score 66 49.9   ALEXANDRA Level 3 2       DATA  Extended Session (60+ minutes): No  Interactive Complexity: No  Crisis: No  [facility] Patient: No    Treatment Objective(s) Addressed in This Session:  Target Behavior(s): disease management/lifestyle changes mental health    Depressed Mood: Increase interest, engagement, and pleasure in doing things  Decrease frequency and intensity of feeling down, depressed, hopeless  Improve quantity and quality of night time sleep / decrease daytime naps  Identify negative self-talk and behaviors: challenge core beliefs, myths, and actions  Improve concentration, focus, and mindfulness in daily activities   Feel less fidgety, restless or slow in daily activities / interpersonal interactions  Anxiety: will experience a reduction in anxiety and will develop more effective coping skills to manage anxiety symptoms  Thought Disturbance: will develop skills to more effectively manage symptoms, will develop more effective support systems and will  continue to take medications as prescribed  PTSD Symptom Management  Psychological distress related to Diabetes  Psychological distress related to Chronic Disease Management    Current Stressors / Issues:    The patient stated that her 's birthday was yesterday and she did okay-we had discussion of grieving-letting go and holding onto the good times-she is talking about holding onto the ashes until the summer and then butting it in the river-a lot of memories of them fishing together-she lost a family member her niece and think that she  of heart attack-the family is going to push back the -she is feeling sad as this person that  would check on her-we talked about her keeping her memory alive with transferring this gift to someone else-we talked about the patient being supportive of the son around him taking his medication-mood has been good and stable-regarding sleeping the patient reported that she is having good sleeping and she is planning to go see about the CPAP  at the medical supply store-the     Progress on Treatment Objective(s) / Homework:  Minimal progress - ACTION (Actively working towards change); Intervened by reinforcing change plan / affirming steps taken    Motivational Interviewing    MI Intervention: Expressed Empathy/Understanding, Supported Autonomy, Collaboration, Evocation, Permission to raise concern or advise, Open-ended questions, Reflections: simple and complex, Rolled with resistance: Emphasized patient autonomy, Simple reflection and Evoked patient agenda and Change talk (evoked)     Change Talk Expressed by the Patient: Desire to change Ability to change Reasons to change Need to change Committment to change Activation Taking steps    Provider Response to Change Talk: E - Evoked more info from patient about behavior change, A - Affirmed patient's thoughts, decisions, or attempts at behavior change, R - Reflected patient's change talk and S - Summarized  patient's change talk statements      Care Plan review completed: No    Medication Review:  No changes to current psychiatric medication(s)     Medication Compliance:  Yes    Changes in Health Issues:   Yes: please see medical note by PCP     Chemical Use Review:   Substance Use: Chemical use reviewed, no active concerns identified      Tobacco Use: No current tobacco use.      Assessment: Current Emotional / Mental Status (status of significant symptoms):  Risk status (Self / Other harm or suicidal ideation)  Patient has had a history of suicidal ideation: yes  Patient denies current fears or concerns for personal safety.  Patient denies current or recent suicidal ideation or behaviors.  Patient denies current or recent homicidal ideation or behaviors.  Patient denies current or recent self injurious behavior or ideation.  Patient denies other safety concerns.  A safety and risk management plan has not been developed at this time, however patient was encouraged to call [place] [phone] should there be a change in any of these risk factors.    Appearance:   Appropriate   Eye Contact:   Good   Psychomotor Behavior: Normal   Attitude:   Cooperative   Orientation:   All  Speech   Rate / Production: Normal    Volume:  Normal   Mood:    Anxious  Normal  Affect:    Appropriate   Thought Content:  Clear   Thought Form:  Coherent  Goal Directed  Logical   Insight:    Fair     Diagnoses:  1. Schizoaffective disorder, bipolar type (H)    2. Posttraumatic stress disorder    3. Schizoaffective disorder, depressive type (H)        Collateral Reports Completed:  Not Applicable    Plan: (Homework, other):  Patient was given information about behavioral services and encouraged to schedule a follow up appointment with the clinic [facility] as needed to work on helping the patient with management of mood states and to work on stress management around her grieving process-also to help the patient with her behavioral needs to comply with  treatment medical recommendations.  She was also given information about mental health symptoms and treatment options .  CD Recommendations: Maintain Sobriety.    Richardson Lopez, [facility], [facility]                                                    Treatment Plan    Client's Name: Nena Tang  YOB: 1960    Date: Reviewed/updated on 2/2/2021     DSM5 Diagnoses: (Sustained by DSM5 Criteria Listed Above)  Diagnoses:  295.70 (F25.0), Schizoaffective disorder, depressive type; 309.81 (F43.10) Posttraumatic Stress Disorder with panic specifier, history of chemical dependency issues (polysubstance dependence)  Psychosocial & Contextual Factors: Academics / Education - yes  Activities of Daily Living - yes  Financial management yes  Follow through with Medical recommendations - yes  Occupational / Vocational - yes  Social / Relational - yes, grief and loss  WHODAS Score: 30      Referral / Collaboration:  Referral to another professional/service is not indicated at this time..    Anticipated number of session or this episode of care: 20      MeasurableTreatment Goal(s) related to diagnosis / functional impairment(s)  Goal 1: Client will improve her ability to manage anxiety and mood states.     I will know I've met my goal when the man does not paralyze the me with fear.     Objective #A (Client Action)    Client will increase understanding of steps in the grief process.  Status: Continued - Date(s):  2/2/2021 The patient stated that she is grieving the death of her mother, sister and -she stated that she is working towards being able to let go of the ashes of her  as she believes this will help her move through grieving.      Intervention(s)  Therapist will teach emotional recognition/identification. teach the navigation of grieving encouraging acceptance and adjustment.    Objective #B  Client will Decrease frequency and intensity of feeling down, depressed, hopeless  Decrease thoughts that you'd be  better off dead or of suicide / self-harm.  Status: Continued - Date(s): 2/2/2021 The patient reported that she has been able to have good times and enjoy activities-she struggles with sleeping as she is waking during the night and having hard time falling asleep-regarding her mood she stated that she has been feeling good and stable-no thoughts of suicide.    Intervention(s)  Therapist will teach emotional regulation skills. with the use of mindful coping strategies and open communication of feelings and thoughts (getting it out to another person)    Client has reviewed and agreed to the above plan.      Richardson Lopez, LICSW  2/2/2021       ______________________________________________________________________

## 2021-02-16 NOTE — PROGRESS NOTES
"Patient Name: Nena Tang      YOB: 1960     Medical Record Number: [id number]  Diagnosis:    Chronic pain syndrome  Fibromyalgia  Chronic bilateral low back pain without sciatica    Visit Info Length of Visit: 30 min - session shortened as pt not feeling well  Therapeutic Procedures/Exercises: 15 min; Therapeutic Activities: NA; Neuromuscular Re-education: NA;  Self Care / Home Management Training: 15 min     Exercise/Activity Education Instruction:  Home Exercise Program:   - Hallway walk 1.5 long laps - took a seated break at 2'30\" (3/4 lap), then a brief pause at 1.25 lap (no sitting).  Pt very tired at end. Entire walk with rests took 9'40\". Reviewed walking program.    - reviewed \"zipper\" (TA) abdominal sets  Self Care:   - use of modalities - consider use of heat on LEs/knees before bed  - issued hot/cold gel pack and instructed in use      Notes:   Patient reports:   - she's not feeling well today - not getting sick, just achy and tired, legs feel weak - has been for past couple days  - shares her niece  over /, her son was in hospital over / and it was the anniversary of her 's birthday yesterday.  Lots of active grief up currently.  - c/o night time pain in her legs/knees is really bad - \"it makes me cry\"; at times her legs tremble/shake; says she cries out for her sister to massage her legs, also indicates she massages her own legs before bed;    Activity tolerance: sleep is disrupted d/t LE pain; very low activity tolerance over past few days - feels achy and tired all over, legs feel very weak    HEP/Self Care/Home Management Training:   HEP did some walking in her hallways as recommended at last visit - says her knees feel worse  Self Care: mostly just rests; no modality use for pain   Body Awareness: not addressed today.      Demonstrates/Verbalizes Technique: 3 (1= poor technique-difficulty performing exercises,significant cues required; 5= good technique-performs " "exercises without cues)  Body Awareness: 3 (1=low awareness; 5=high awareness)  Posture/Stability: 3 (1= poor posture, stability; 5= good posture, stability)   Motivational Level:   Cooperative but having a hard time focusing - states she just doesn't feel well Participation:  Participated but hard to focus   Patient Satisfaction:  satisfied   Response to Teaching:   demonstrated ability and verbalized, recall/understanding  Factors that affect learning: None   Plan of Care  Cont PT to support reactivation and integration of self regulation pain management skills. (next visit consider: break down bed mobility as an exercise, check \"zipper\" (TA) abdominal set, stretches, check if using heat.)    Therapist: Pam Be, PT                  Date: 2/16/2021        "

## 2021-02-17 DIAGNOSIS — E11.3299 TYPE 2 DIABETES MELLITUS WITH MILD NONPROLIFERATIVE RETINOPATHY WITHOUT MACULAR EDEMA, WITH LONG-TERM CURRENT USE OF INSULIN, UNSPECIFIED LATERALITY (H): ICD-10-CM

## 2021-02-17 DIAGNOSIS — Z79.4 TYPE 2 DIABETES MELLITUS WITH MILD NONPROLIFERATIVE RETINOPATHY WITHOUT MACULAR EDEMA, WITH LONG-TERM CURRENT USE OF INSULIN, UNSPECIFIED LATERALITY (H): ICD-10-CM

## 2021-02-17 NOTE — TELEPHONE ENCOUNTER
Signed Prescriptions:                        Disp   Refills    insulin pen needle (NOVOFINE 30) 30G X 8 M*400 ea*1        Sig: USE 4 DAILY OR AS DIRECTED  Authorizing Provider: MICHAELLE CALIXTO  Ordering User: YONY VILLANUEVA RN  [facility]

## 2021-02-18 NOTE — PROGRESS NOTES
"Nena Tang is a 61 year old female who is being evaluated via a billable telephone visit.      The patient has been notified of following:     \"This telephone visit will be conducted via a call between you and Sonya Kohler PsyD LP. We have found that certain health care needs can be provided without the need for an in-person session.  This service lets us provide the care you need with a phone conversation.       If during the course of the call I feel a telephone visit is not appropriate, you will not be charged for this service.\"      Reviewed that patient is in a quiet private place, no recording without permission, all apps and notifications of any devices are turned off. Began the session by talking about the risks and benefits of telehealth, what to do if there is a break in the connection, reviewed the safety protocol for during and after sessions. The purpose of using telehealth for this session was due to the COVID-19 pandemic and was to reduce the spread of the disease and protect both the clinician and client.        Telephone visits are billed at different rates depending on your insurance coverage. During this emergency period, for some insurers they may be billed the same as an in-person visit.  Please reach out to your insurance provider with any questions.       Patient has given verbal consent for telephone visit? YES    Phone call contact time  Call Started at 11:00 AM  Call Ended at 11:35 AM  Total 35 minutes    IDENTIFYING INFORMATION: The patient is a 61 year old, , ,  Individual, who was seen today for a behavioral assessment as part of the evaluation process at the [facility].         This patient is referred for consultation by Shell Paz MD; please see their notes for more details of their pain symptoms. This patient is referred to pain services by ART Lobo CNP. Patient's primary complaint today is chronic pain.     PAIN " DIAGNOSES per pain provider:        Chronic bilateral low back pain without sciatica     Myofascial pain    Patient reports difficulty with function in relationship to their pain. Patient reports onset of their pain symptoms ~7-8 years ago.     Per chart review of initial visit with Shell Paz MD on 12/15/2020: 'Nena Tang is a 60 year old female who first started having problems with pain about ~7-8 years ago. She recalls having to lift her late , who weighed 200lbs+ in and out of the tub. Which she thinks might have caused some of her pain. The pain is constant and achy, which is present in the low back and radiates to the sides. The pain does not travel below the belt line and just localized to the mid back. She does have some weakness to bilateral legs, which she had a fall about 6 months ago, however did not sustain any injuries. Pain increases with standing still, and she can only stand still for about 5-6 minutes. Pain is also increased with activity and improved with sitting and rest. She requires a walker for stability and to mobilize around the house. She has a PCA which assists her with bathing and medication assistance, and the patient is able perform most other activities of daily living. She states having pain with almost any activity that requires her to bend/twist and move around in general.  She denies any numbness, tingling or other paraesthesias. Otherwise, she remains to be in a good mood and would like to get some help with this pain.'    Pain description:  Pain rating: intensity ranges from 7/10 to 8/10, and Averages 7/10 on a 0-10 scale.  Aggravating factors include: walking, lying down  Relieving factors include: Sitting, standing still  Any bowel or bladder incontinence: none. Has baseline urinary incontinence    Patient has not worked with a pain clinic in the past: n/a.    Pain interferes with the following:     Relationships with important others:  Not socializing as  "she'd like due to pain   Occupational:  On disability, worked as a  a long time ago   Overall Quality of Life:  \"it's been rough\" - walking, going up stairs, moving in general; denies emotional impact from pain   Ability to complete ADLS:  PCA which assists her with bathing and medication assistance, and the patient is able perform most other activities of daily living     Frequency of discussing their pain with others: Talks to sister about pain  Frequency of thinking about their pain: every day  Ability to pace activity or obey limitations: \"I don't know\"   Ability to relax: 'a little bit'  Current stress level: medium  Current stressors include: pain     Patient reports the following as it relates to how their pain impacts their sleep hygiene (endorsed in BOLD):  Difficulty falling asleep / problems mid-awakenings / poor quality of sleep / daytime sleepiness or fatigue / napping    Patient reports obtaining approximately 6-7 hours of sleep per night. This sleep is disrupted by pain in her legs and knees.   Patient reports their exercise regimen currently includes nothing - only engages in PT exercises.    MENTAL HEALTH HISTORY:        Patient reports being diagnosed with Depression with suicidal ideation in the past; history of schizoaffective disorer. Patient reports history of hospitalization for mental health reasons - she is unsure how many times she has been hospitalized in her lifetime.     Patient reports the following psychotropic medications are currently prescribed: Clonazepam, hydroxyzine, zoloft and the following have been prescribed in the past: did not assess. Patient reports did not assess side effects from their medications. Patient s mental health history and support includes previous and current mental health therapy. Patient reports current passive thoughts of suicidal ideation which she states is more thoughts of harming herself but not killing herself. Reports history of self-harm, " "most recently a few years ago - she reports she attempted in the past to take too much of her insulin. Has RN services to set up medications. Patient reports no history of homicidal ideation. Patient reports no history of abuse. Patient denies symptoms of jennifer, reports history of psychosis - auditory and visual , denies disordered eating, PTSD. Other symptoms patient reports include none.    STRENGTHS/LIMITATIONS INCLUDE:   Patient identified the following strengths or resources that will help them succeed in treatment:   \"none so far\" Things that may interfere with the patient's success in treatment include: patient did not seem to understand question    SOCIAL HISTORY:  Patient currently resides with sister. Patient has 2 children. Patient's social support network includes sister, therapist, best friend. Patient was raised in [place] and has 6 siblings, only 2 siblings are still alive. Patient states her parents relationship with each other ended before patient was born. Mother received public assistance. Patient reports positive family history of mental health issues: schizophrenia - sister now . Patient reports positive family history of substance abuse issues: sister - opiates currently on methadone; sister - alcohol.                CHEMICAL HEALTH BEHAVIORS:    Any illicit drug use: remote h/o cocaine - sober 20-30 years, denies other substance use  Alcohol use: 'long, long, long time ago' - reports at the time others were concern about her use but denies being concerned herself  Caffeine use: 2 bottles of diet pop daily  Nicotine use: never smoker  Any use of prescriptions other than how they were prescribed: none    Previous chemical dependency or other addictive behavior treatment: none          CAGE/ AID QUESTIONNAIRE:           The CAGE screening questions (asking whether patients felt they should cut down on drinking, were annoyed by others criticizing her drinking, felt guilty about " "use, or ever had an eye opener) were asked of the patient to determine possible ETOH or chemical abuse issues.   Her positive answers are as follows.    Have you ever:  C    Patient felt they ought to CUT down on your drinking (or drug use)., A     Patient felt ANNOYED by people criticizing their drinking (or drug use). and G     Patient felt bad or GUILTY about their drinking (or drug use).  Patient reports she has been sober 20-30 years per self-report.    CURRENT MEDICAL CONCERNS:   HTN, DM2 A1C 9.1, CINDY on CPAP, MDD, GERD and low back pain.             History of Head Trauma or evidence of cognitive impairment:   No history of concussions or head trauma; reports memory is intact, no concerns about cognitive function          OCCUPATIONAL AND EDUCATIONAL HISTORY:  Patient reports they are currently not working. 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 Patient has no history in the . Patient is currently receiving disability benefits.    PATIENT'S TREATMENT GOALS: \"To get it [pain] better.\"    ASSESSMENT:   Patient is here today to determine whether pain psychology could be of benefit to their pain management services. Patient reports onset of pain about 8 years ago which she believes is a result of lifting her now   in/out of the bathtub. Patient is a somewhat unreliable historian, so a full picture of how pain impacts her life is somewhat complicated by this. She reports she has not used alcohol for 20-30 years, however chart review indicates possibly using alcohol in the past few years; her mental health diagnosis may also be complicating reliability due to previous diagnosis of schizoaffective disorder. She struggled to respond to or at times did not seem to understand questions despite being asking in plain and simple language. She initially seemed distracted by home health RN who was conducting vitals and " helping her set up her medication box at the beginning of the appointment. It was difficult to assess both the impact of her pain, as well as specific strategies she is using or has used to manage her pain; thus, further assessment at follow up may provide additional areas of attention for pain psychology. Discussed that patient would likely benefit from adding the following to her overall pain treatment program:   - review of or development of self-soothing and self-comfort strategies  - basic relaxation strategies to include mindfulness  - basic psychoeducation on the interplay between mood and pain  - development of a regular pain management regimen to include pain flare plan  - exploration of the concepts of radical acceptance and window of tolerance    Telephone-Visit Details    Type of service:  Telephone Visit    Originating Location (pt. Location): [place]    Distant Location (provider location):  [facility]     Mode of Communication:  Telephone    The patient participated in a virtual health and behavioral evaluation (billed 86151).  The limits of confidentiality and mandated reporting requirements were discussed. Time spent with patient: 35 minutes in virtual patient contact for a psychological diagnostic assessment and pain evaluation.      Sonya Kohler PsyD, LP ...............  February 18, 2021  Outpatient Clinic Therapist  [facility]    Disclaimer: This note consists of symbols derived from keyboarding, dictation and/or voice recognition software. As a result, there may be errors in the script that have gone undetected. Please consider this when interpreting information found in this chart.

## 2021-02-19 ENCOUNTER — MEDICAL CORRESPONDENCE (OUTPATIENT)
Dept: HEALTH INFORMATION MANAGEMENT | Facility: CLINIC | Age: 61
End: 2021-02-19

## 2021-02-23 DIAGNOSIS — K21.9 GASTROESOPHAGEAL REFLUX DISEASE WITHOUT ESOPHAGITIS: ICD-10-CM

## 2021-02-24 RX ORDER — FAMOTIDINE 20 MG/1
20 TABLET, FILM COATED ORAL DAILY
Qty: 90 TABLET | Refills: 0 | Status: SHIPPED | OUTPATIENT
Start: 2021-02-24 | End: 2021-07-06

## 2021-02-26 ENCOUNTER — TELEPHONE (OUTPATIENT)
Dept: FAMILY MEDICINE | Facility: CLINIC | Age: 61
End: 2021-02-26

## 2021-02-26 NOTE — TELEPHONE ENCOUNTER
Orders completed and faxed back to [facility] @ [phone]. Placed in abstraction to be scanned into patient's chart.    Richie Cisneros, Patient Representative

## 2021-03-01 ENCOUNTER — MEDICAL CORRESPONDENCE (OUTPATIENT)
Dept: HEALTH INFORMATION MANAGEMENT | Facility: CLINIC | Age: 61
End: 2021-03-01

## 2021-03-02 ENCOUNTER — OFFICE VISIT (OUTPATIENT)
Dept: BEHAVIORAL HEALTH | Facility: CLINIC | Age: 61
End: 2021-03-02
Payer: COMMERCIAL

## 2021-03-02 ENCOUNTER — OFFICE VISIT (OUTPATIENT)
Dept: PHARMACY | Facility: CLINIC | Age: 61
End: 2021-03-02
Payer: COMMERCIAL

## 2021-03-02 ENCOUNTER — OFFICE VISIT (OUTPATIENT)
Dept: FAMILY MEDICINE | Facility: CLINIC | Age: 61
End: 2021-03-02
Payer: COMMERCIAL

## 2021-03-02 ENCOUNTER — OFFICE VISIT (OUTPATIENT)
Dept: PALLIATIVE MEDICINE | Facility: CLINIC | Age: 61
End: 2021-03-02
Payer: COMMERCIAL

## 2021-03-02 VITALS
WEIGHT: 217 LBS | DIASTOLIC BLOOD PRESSURE: 62 MMHG | TEMPERATURE: 97.2 F | SYSTOLIC BLOOD PRESSURE: 120 MMHG | OXYGEN SATURATION: 95 % | HEART RATE: 84 BPM | BODY MASS INDEX: 42.38 KG/M2

## 2021-03-02 DIAGNOSIS — F25.1 SCHIZOAFFECTIVE DISORDER, DEPRESSIVE TYPE (H): ICD-10-CM

## 2021-03-02 DIAGNOSIS — E11.3299 TYPE 2 DIABETES MELLITUS WITH MILD NONPROLIFERATIVE RETINOPATHY WITHOUT MACULAR EDEMA, WITH LONG-TERM CURRENT USE OF INSULIN, UNSPECIFIED LATERALITY (H): Primary | ICD-10-CM

## 2021-03-02 DIAGNOSIS — E66.9 OBESITY, UNSPECIFIED OBESITY SEVERITY, UNSPECIFIED OBESITY TYPE: ICD-10-CM

## 2021-03-02 DIAGNOSIS — G47.33 OSA (OBSTRUCTIVE SLEEP APNEA): ICD-10-CM

## 2021-03-02 DIAGNOSIS — E11.65 TYPE 2 DIABETES MELLITUS WITH HYPERGLYCEMIA, WITH LONG-TERM CURRENT USE OF INSULIN (H): ICD-10-CM

## 2021-03-02 DIAGNOSIS — M79.7 FIBROMYALGIA: ICD-10-CM

## 2021-03-02 DIAGNOSIS — G89.4 CHRONIC PAIN SYNDROME: ICD-10-CM

## 2021-03-02 DIAGNOSIS — N39.46 MIXED INCONTINENCE: ICD-10-CM

## 2021-03-02 DIAGNOSIS — Z79.4 TYPE 2 DIABETES MELLITUS WITH HYPERGLYCEMIA, WITH LONG-TERM CURRENT USE OF INSULIN (H): ICD-10-CM

## 2021-03-02 DIAGNOSIS — G89.29 CHRONIC BILATERAL LOW BACK PAIN WITHOUT SCIATICA: ICD-10-CM

## 2021-03-02 DIAGNOSIS — M54.50 CHRONIC BILATERAL LOW BACK PAIN WITHOUT SCIATICA: ICD-10-CM

## 2021-03-02 DIAGNOSIS — Z79.4 TYPE 2 DIABETES MELLITUS WITH MILD NONPROLIFERATIVE RETINOPATHY WITHOUT MACULAR EDEMA, WITH LONG-TERM CURRENT USE OF INSULIN, UNSPECIFIED LATERALITY (H): Primary | ICD-10-CM

## 2021-03-02 DIAGNOSIS — M54.50 CHRONIC RIGHT-SIDED LOW BACK PAIN WITHOUT SCIATICA: Primary | ICD-10-CM

## 2021-03-02 DIAGNOSIS — G89.29 CHRONIC RIGHT-SIDED LOW BACK PAIN WITHOUT SCIATICA: Primary | ICD-10-CM

## 2021-03-02 DIAGNOSIS — F25.1 SCHIZOAFFECTIVE DISORDER, DEPRESSIVE TYPE (H): Primary | ICD-10-CM

## 2021-03-02 DIAGNOSIS — G89.4 CHRONIC PAIN SYNDROME: Primary | ICD-10-CM

## 2021-03-02 PROCEDURE — 97535 SELF CARE MNGMENT TRAINING: CPT | Mod: GP | Performed by: PHYSICAL THERAPIST

## 2021-03-02 PROCEDURE — 97110 THERAPEUTIC EXERCISES: CPT | Mod: GP | Performed by: PHYSICAL THERAPIST

## 2021-03-02 PROCEDURE — 99606 MTMS BY PHARM EST 15 MIN: CPT | Performed by: PHARMACIST

## 2021-03-02 PROCEDURE — 99214 OFFICE O/P EST MOD 30 MIN: CPT | Performed by: NURSE PRACTITIONER

## 2021-03-02 PROCEDURE — 90832 PSYTX W PT 30 MINUTES: CPT

## 2021-03-02 PROCEDURE — 99607 MTMS BY PHARM ADDL 15 MIN: CPT | Performed by: PHARMACIST

## 2021-03-02 NOTE — PROGRESS NOTES
Medication Therapy Management (MTM) Encounter    ASSESSMENT:                            Medication Adherence/Access: See below for considerations    Type 2 Diabetes: A1c not at goal <7%. Encouraged resuming insulins and SMBG. Education on carbohydrates and encouraged reduction.  Reviewed injection technique with Trulicity and no issues noted.  Will schedule separate visit to provide education on CGM.    Schizoaffective: referral to consult psychiatry Dr Brothers for medication review as mood has been fluctuating. Continue therapy with [facility].      Fibromyalgia: continue exercise and PT    Weight: encouraged reducing carbohydrates and increasing exercise as tolerated.     PLAN:                            1. Work on adherence to insulin  2. Decrease carbohydrate intake    Follow-up: 2 weeks for CGM teaching, 4 weeks MTM    SUBJECTIVE/OBJECTIVE:                          Nena Tang is a 61 year old female coming in for a follow-up visit. She was referred to me from Rowena Haas. Today's visit is a co-visit with PCP and Ev Dixon [facility]. Today's visit is a follow-up MTM visit from 2/2/21.     Reason for visit: medication check.    Tobacco: She reports that she has never smoked. She has never used smokeless tobacco.  Alcohol: not currently using    Medication Adherence/Access: Taking pills but skipping insulin frequently. Thinks she has taken 3 injections in the past week.   RN helped her get a med machine, hasn't set up yet.    Type 2 Diabetes: Pt currently prescribed Lantus 60u daily, Novolog 24u BID, Trulicity 1.5mg weekly. She has been skipping injections as above. Unable to feel Trulicity injections at times and wonders if it's working. Pt is not experiencing side effects.    SMBG: has not been testing. Forgot to bring CGM today.  No hypoglycemia.  Recent symptoms of high blood sugar? polyphagia, fatigue.  Eye exam: up to date  Foot exam: up to date  ACEi/ARB: No.   Urine Albumin:   Lab Results   Component  "Value Date    MICROL 7 09/15/2020   Aspirin: Taking 81mg daily and denies side effects  Diet/Exercise: eating twice a day. Less snacks. Sister is making meals or eats fast food. Unable to identify carbohydrates in her meals.  Lab Results   Component Value Date    A1C 9.1 12/01/2020    A1C 9.7 09/15/2020    A1C 8.6 06/30/2020    A1C 6.2 12/03/2019    A1C 6.6 08/06/2019     Schizoaffective: Currently taking sertraline 200mg daily, Invega 9mg daily, amantadine 100mg BID, trazodone 100mg HS, clonazepam 0.5mg HS. She is not using CPAP.  Mood has been down.  Feels depressed, \"not taking care of myself\".  Has not been checking glucose, not taking as many insulin doses as she should. Her niece passed away, feels overwhelmed with grief. Not sleeping well. Continues to live with her sister who is supportive. Appetite is variable.   See [facility] notes for additional details.     Fibromyalgia: currently taking Topiramate 100mg BID.  Fall today going down stairs at her son's house. Fell on her bottom, feels like legs gave out. Has not been using her walker.  Has been attending PT. Continues to [facility] and exercises.   Side effects: none reported.   Working with pain clinic for medication management and PT.     Weight: currently taking topiramate 100mg BID and Trulicity 1.5mg weekly.  She does not feel that these medications are helping with weight management. Has been walking and doing some light exercise.  Wt Readings from Last 3 Encounters:   03/02/21 217 lb (98.4 kg)   02/02/21 221 lb 5 oz (100.4 kg)   02/02/21 221 lb 5 oz (100.4 kg)        Today's Vitals: LMP 01/06/2015 (Exact Date)   BP Readings from Last 1 Encounters:   03/02/21 120/62     Pulse Readings from Last 1 Encounters:   03/02/21 84     Wt Readings from Last 1 Encounters:   03/02/21 217 lb (98.4 kg)     Ht Readings from Last 1 Encounters:   02/02/21 5' (1.524 m)     Estimated body mass index is 42.38 kg/m  as calculated from the following:    Height as " of 2/2/21: 5' (1.524 m).    Weight as of an earlier encounter on 3/2/21: 217 lb (98.4 kg).    Temp Readings from Last 1 Encounters:   03/02/21 97.2  F (36.2  C) (Temporal)       ----------------    I spent 40 minutes with this patient today. All changes were made via collaborative practice agreement with Rowena Haas A copy of the visit note was provided to the patient's primary care provider.    The patient was given a summary of these recommendations. See Provider note/AVS from today.     Eugenia De Jesus, KiD, BCACP

## 2021-03-02 NOTE — PROGRESS NOTES
[facility] Primary Care: : Integrated Behavioral Health  March 2, 2021    Behavioral Health Clinician Progress Note    Patient Name: Nena Tang           Service Type:  Individual      Service Location:   Face to Face in Clinic     Session Start Time: 1:06 pm  Session End Time:  1:29 pm      Session Length: 16 - 37      Attendees: Patient, PCP and MTM    Visit Activities (Refresh list every visit): [facility] [employer]    Diagnostic Assessment Date: 1/23/18, Update 12/12/19  Treatment Plan Review Date: 5/2/21  CGI date completed: 11/19/20    See Flowsheets for today's PHQ-9 and TAMIA-7 results  Previous PHQ-9:   PHQ-9 SCORE 10/26/2018 5/7/2019 12/1/2020   PHQ-9 Total Score - - -   PHQ-9 Total Score - - -   PHQ-9 Total Score 20 22 13     Previous TAMIA-7:   TAMIA-7 SCORE 2/3/2017 3/13/2018 10/26/2018   Total Score - - -   Total Score 0 17 19   Total Score - - -       ALEXANDRA LEVEL:  ALEXANDRA Score (Last Two) 8/30/2011 6/9/2014   ALEXANDRA Raw Score 42 37   Activation Score 66 49.9   ALEXANDRA Level 3 2       DATA  Extended Session (60+ minutes): No  Interactive Complexity: No  Crisis: No  [facility] Patient: No    Treatment Objective(s) Addressed in This Session:  Target Behavior(s): disease management/lifestyle changes mental health    Depressed Mood: Increase interest, engagement, and pleasure in doing things  Decrease frequency and intensity of feeling down, depressed, hopeless  Improve quantity and quality of night time sleep / decrease daytime naps  Identify negative self-talk and behaviors: challenge core beliefs, myths, and actions  Improve concentration, focus, and mindfulness in daily activities   Feel less fidgety, restless or slow in daily activities / interpersonal interactions  Grief / Loss: will increase understanding of normal grieving process, will engage in effective approach to address and resolve grief/loss issues and will process grief/loss issues in an adaptive manner    Current Stressors / Issues:  [facility] co visit  "with patient, PCP and MTM in the clinic. Patient reports depression. She expressed exhaustion and overwhelm. She processed feelings of grief and loss about her niece's passing. She states that she hasn't been taking care of herself as a result. Patient reports she has been taking her insulin \"off and on.\" Validated patient's feelings and discussed the relationship between our mental and physical health. Patient agreed to schedule a follow up visit with [facility] Richardson Lopez for additional support. Patient shared frustration about her weight. Patient reports variable appetite and limited activity. Discussed ways to exercise that are within her physical abilities. PCP suggested going for walks with her walker. Patient reports passive SI, she denies intent or plan.     Progress on Treatment Objective(s) / Homework:  New Objective established this session - CONTEMPLATION (Considering change and yet undecided); Intervened by assessing the negative and positive thinking (ambivalence) about behavior change    Motivational Interviewing    MI Intervention: Expressed Empathy/Understanding, Supported Autonomy, Collaboration, Evocation, Permission to raise concern or advise, Open-ended questions, Reflections: simple and complex, Rolled with resistance: Emphasized patient autonomy, Simple reflection and Evoked patient agenda and Change talk (evoked)     Change Talk Expressed by the Patient: Desire to change Reasons to change    Provider Response to Change Talk: E - Evoked more info from patient about behavior change, A - Affirmed patient's thoughts, decisions, or attempts at behavior change, R - Reflected patient's change talk and S - Summarized patient's change talk statements      Care Plan review completed: No    Medication Review:  No changes to current psychiatric medication(s)     Medication Compliance:  Yes    Changes in Health Issues:   Yes: please see medical note by PCP     Chemical Use Review:   Substance Use: Chemical use " reviewed, no active concerns identified      Tobacco Use: No current tobacco use.      Assessment: Current Emotional / Mental Status (status of significant symptoms):  Risk status (Self / Other harm or suicidal ideation)  Patient has had a history of suicidal ideation: yes  Patient denies current fears or concerns for personal safety.  Patient denies current or recent suicidal ideation or behaviors.  Patient denies current or recent homicidal ideation or behaviors.  Patient denies current or recent self injurious behavior or ideation.  Patient denies other safety concerns.  A safety and risk management plan has not been developed at this time, however patient was encouraged to call [place] [phone] should there be a change in any of these risk factors.    Appearance:   Appropriate   Eye Contact:   Good   Psychomotor Behavior: Normal   Attitude:   Cooperative   Orientation:   All  Speech   Rate / Production: Normal    Volume:  Normal   Mood:    Depressed   Affect:    Appropriate  Subdued   Thought Content:  Clear   Thought Form:  Coherent  Goal Directed  Logical   Insight:    Fair     Diagnoses:  1. Schizoaffective disorder, depressive type (H)      Collateral Reports Completed:  Not Applicable    Plan: (Homework, other):  Patient was given information about behavioral services and encouraged to schedule a follow up appointment with the clinic [facility] in 2 weeks.  She was also given information about mental health symptoms and treatment options .  CD Recommendations: Maintain Sobriety.   TWILA Quinn  Reviewed/Supervised by: BIN Lee   This note has been reviewed and I agree with the plan of care. This note is co-signed by LORRAINE Lee, BIN, Supervisor, on: 4/7/21                                                   Treatment Plan    Client's Name: Nena Tang  YOB: 1960    Date: Reviewed/updated on 2/2/2021     DSM5 Diagnoses: (Sustained by DSM5 Criteria Listed  Above)  Diagnoses:  295.70 (F25.0), Schizoaffective disorder, depressive type; 309.81 (F43.10) Posttraumatic Stress Disorder with panic specifier, history of chemical dependency issues (polysubstance dependence)  Psychosocial & Contextual Factors: Academics / Education - yes  Activities of Daily Living - yes  Financial management yes  Follow through with Medical recommendations - yes  Occupational / Vocational - yes  Social / Relational - yes, grief and loss  WHODAS Score: 30      Referral / Collaboration:  Referral to another professional/service is not indicated at this time..    Anticipated number of session or this episode of care: 20      MeasurableTreatment Goal(s) related to diagnosis / functional impairment(s)  Goal 1: Client will improve her ability to manage anxiety and mood states.     I will know I've met my goal when the man does not paralyze the me with fear.     Objective #A (Client Action)    Client will increase understanding of steps in the grief process.  Status: Continued - Date(s):  2/2/2021 The patient stated that she is grieving the death of her mother, sister and -she stated that she is working towards being able to let go of the ashes of her  as she believes this will help her move through grieving.      Intervention(s)  Therapist will teach emotional recognition/identification. teach the navigation of grieving encouraging acceptance and adjustment.    Objective #B  Client will Decrease frequency and intensity of feeling down, depressed, hopeless  Decrease thoughts that you'd be better off dead or of suicide / self-harm.  Status: Continued - Date(s): 2/2/2021 The patient reported that she has been able to have good times and enjoy activities-she struggles with sleeping as she is waking during the night and having hard time falling asleep-regarding her mood she stated that she has been feeling good and stable-no thoughts of suicide.    Intervention(s)  Therapist will teach  emotional regulation skills. with the use of mindful coping strategies and open communication of feelings and thoughts (getting it out to another person)    Client has reviewed and agreed to the above plan.      Richardson oLpez, [facility] 2/2/2021

## 2021-03-02 NOTE — PROGRESS NOTES
Assessment & Plan     Chronic right-sided low back pain without sciatica  Chronic pain syndrome  Worsening pain. Currently enrolled with the pain clinic for her pain management and also working with physical therapy weekly.     CINDY (obstructive sleep apnea)- mild AHI 10.3  Still an issue. Patient has not been using her CPAP. Notes that she needs new tubing for the machine since she has not use it for a long time.   - Discussed the necessity of using her CPAP with her sleep apnea.   -Patient notes that her  will help her with calling the appropriate places to get the tubing.   - New DME order for supplies done.     Schizoaffective disorder, depressive type (H)  Worsening depression. Patient is struggling with the recent death in her family and having a difficult time in taking care of herself.   -Discussed checking back in with Dr. Brothers (psychiatry) for medication management.     Mixed incontinence  Continues to be an issue. Patient declines pelvic PT. Will continue with depends for now.     Type 2 diabetes mellitus with hyperglycemia, with long-term current use of insulin (H)  Uncontrolled. Continued to encourage medication compliance.   Lab Results   Component Value Date    A1C 9.1 12/01/2020    A1C 9.7 09/15/2020    A1C 8.6 06/30/2020    A1C 6.2 12/03/2019    A1C 6.6 08/06/2019   0956}     BMI:   Estimated body mass index is 42.38 kg/m  as calculated from the following:    Height as of 2/2/21: 1.524 m (5').    Weight as of this encounter: 98.4 kg (217 lb).   Weight management plan: Discussed healthy diet and exercise guidelines will follow-up with Eugenia MAO) for medication changes.     Patient Instructions   -Call clinic with any questions or concerns.         Return in about 4 weeks (around 3/30/2021) for Routine Visit.    ART Fay CNP  [facility]    Surjit Barrett is a 61 year old who presents for the following health issues     HPI   [facility]:  Juli  MTM: Eugenia    Patient reports that she got her medication machine set- up. Patient reports that she has been exhausted and in pain. Reports an increase in depression. Lost her niece, has not been wanting to take care of herself. Using her insulin off and on and struggling with checking her blood sugars.    Fall: Fell today while coming down the stairs, on her butt. Patient notes that her weight has not been helping with her pain and balance. Patient has also not been using her walker as she should.     Diabetes Follow-up  Patient reports that she has not been checking her insulin or checking her blood sugars as recommended. Patient reports that she is eating about 2 meals per day. Reports that she is typically feeling more tired after all her meals.   Discussed carbohydrate examples from her meal options.   BP Readings from Last 2 Encounters:   03/02/21 120/62   02/02/21 108/66     Hemoglobin A1C (%)   Date Value   12/01/2020 9.1 (H)   09/15/2020 9.7 (H)     LDL Cholesterol Calculated (mg/dL)   Date Value   12/01/2020 141 (H)   12/22/2019 86               Hypertension Follow-up  Not recently checked. Denies any symptoms today.     Do you check your blood pressure regularly outside of the clinic? Yes weekly per nurse.     Are you following a low salt diet? Yes    Are your blood pressures ever more than 140 on the top number (systolic) OR more   than 90 on the bottom number (diastolic), for example 140/90? No.      Mental Health Follow up: Schizoaffective Depression  Still sad and tired of her family dying, getting sick of it. Her sister is supportive of her although she is struggling with. Some suicidal thoughts with the increased depression. No plans to harm herself. Patient reports that she has been having some visual hallucinations- not bothersome.     Status since last visit: more depressed with the recent death.     See PHQ-9 for current symptoms.  Other associated symptoms: None    Complicating factors:  more depression.   Significant life event:  No   Current substance abuse:  None  Anxiety or Panic symptoms:  No    Sleep - not as well. Cannot use her CPAP until she can get new tubing for her machine.    Appetite - fair.   Exercise - working with PT and will sometimes working out at the drop in center.     PHQ-9  English PHQ-9   Any Language               Problems taking medications regularly: No    Medication side effects: none    Diet: diabetic    Social History     Tobacco Use     Smoking status: Never Smoker     Smokeless tobacco: Never Used   Substance Use Topics     Alcohol use: No     Frequency: Never     Comment: last month        Problem list and histories reviewed & adjusted, as indicated.  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 (H)     Illiterate     Irritable bowel syndrome     overweight - BMI >35     Takotsubo cardiomyopathy     CAD (coronary artery disease)     Restless legs syndrome (RLS)     CINDY (obstructive sleep apnea)- mild AHI 10.3     Verbal auditory hallucination     Chronic low back pain     Schizoaffective disorder, depressive type (H)     Migraine headache     HTN, goal below 140/90     Health Care Home     Lumbago     Cervicalgia     Cocaine abuse, episodic (H)     Suicidal ideation     Esophageal reflux     Mild nonproliferative diabetic retinopathy (H)     Tardive dyskinesia     Alcohol use     Left cataract     Falls frequently     History of uterine cancer     Psychophysiological insomnia     Dysuria     Asymptomatic postmenopausal status     Abdominal pain, right lower quadrant     Infectious encephalopathy     Non-intractable vomiting with nausea     Thoughts of self harm     Gastroenteritis     Posttraumatic stress disorder     Cervical cancer screening     Type 2  diabetes mellitus with stage 3 chronic kidney disease, with long-term current use of insulin (H)     Positive AIDA (antinuclear antibody)     Hyperglycemia     Pneumonia     Depression with suicidal ideation     Mixed incontinence     Chronic pain syndrome     Fibromyalgia     Past Surgical History:   Procedure Laterality Date     C OOPHORECTORMY FOR RAHEL, W/BX  1983    UTERINE     CATARACT IOL, RT/LT Bilateral 2017     COLONOSCOPY N/A 3/16/2017    Procedure: COLONOSCOPY;  Surgeon: Traci Gonzalez MD;  Location: [facility]     Coronary CTA  5/21/2014     HYSTERECTOMY  1983    uterine cancer yearly pap's per provider.     LAPAROSCOPIC CHOLECYSTECTOMY  2008     PHACOEMULSIFICATION CLEAR CORNEA WITH STANDARD INTRAOCULAR LENS IMPLANT Left 5/5/2017    Procedure: PHACOEMULSIFICATION CLEAR CORNEA WITH STANDARD INTRAOCULAR LENS IMPLANT;  LEFT EYE PHACOEMULSIFICATION CLEAR CORNEA WITH STANDARD INTRAOCULAR LENS IMPLANT ;  Surgeon: Tyra Diaz MD;  Location:  [facility]     PHACOEMULSIFICATION CLEAR CORNEA WITH STANDARD INTRAOCULAR LENS IMPLANT Right 6/30/2017    Procedure: PHACOEMULSIFICATION CLEAR CORNEA WITH STANDARD INTRAOCULAR LENS IMPLANT;  RIGHT EYE PHACOEMULSIFICATION CLEAR CORNEA WITH STANDARD INTRAOCULAR LENS IMPLANT;  Surgeon: Tyra Diaz MD;  Location:  [facility]     RELEASE TRIGGER FINGER  10/11/2012    Left thumb. Procedure: RELEASE TRIGGER FINGER;  LEFT THUMB TRIGGER RELEASE;  Surgeon: Tay Langley MD;  Location:  [facility]     RELEASE TRIGGER FINGER Right 12/26/2016    Procedure: RELEASE TRIGGER FINGER;  Surgeon: Albino Castañeda MD;  Location:  [facility]       Social History     Tobacco Use     Smoking status: Never Smoker     Smokeless tobacco: Never Used   Substance Use Topics     Alcohol use: No     Frequency: Never     Comment: last month     Family History   Problem Relation Age of Onset     Cancer Mother         BLADDER     Respiratory Mother         COPD     Gastrointestinal Disease Mother          CIRRHOSIS OF LI BOLIVAR     Alcohol/Drug Mother      Diabetes Mother      Hypertension Mother      Lipids Mother      C.A.D. Mother      Glaucoma Mother      Alcohol/Drug Sister      Mental Illness Sister      Alcohol/Drug Sister      Psychotic Disorder Sister      Cancer Maternal Grandmother         UNKNOWN TYPE     Cancer Brother         COLON     Cancer - colorectal Brother         IN HIS LATE 30S     Alcohol/Drug Brother          OF HEROIN OVERDOSE AT AGE 22 YRS     Macular Degeneration No family hx of            Current Outpatient Medications   Medication Sig Dispense Refill     acetaminophen (TYLENOL) 650 MG CR tablet Take 1 tablet (650 mg) by mouth every 8 hours as needed for mild pain or fever 120 tablet 1     alcohol swab prep pads Use to swab area of injection/rodolfo as directed. 100 each 3     amantadine (SYMMETREL) 100 MG capsule Take 1 capsule (100 mg)  in the morning and 2 capsules (200 mg) in the evening. 60 capsule 3     aspirin (ASA) 81 MG EC tablet TAKE 1 TABLET (81MG) BY MOUTH DAILY 90 tablet 3     atorvastatin (LIPITOR) 80 MG tablet TAKE 1 TABLET (80MG) BY MOUTH DAILY 30 tablet 11     blood glucose (NO BRAND SPECIFIED) test strip Use to test blood sugar 4 times daily or as directed. 100 strip 11     clonazePAM (KLONOPIN) 0.5 MG tablet Take 1 tablet (0.5 mg) by mouth At Bedtime 30 tablet 3     Continuous Blood Gluc Sensor (FREESTYLE LEBRON 14 DAY SENSOR) MISC 1 Device every 14 days Change sensor as directed every 14 days 2 each 11     diclofenac (VOLTAREN) 1 % topical gel Place 4 g onto the skin 4 times daily as needed for moderate pain       famotidine (PEPCID) 20 MG tablet Take 1 tablet (20 mg) by mouth daily 90 tablet 0     fluticasone-salmeterol (ADVAIR) 250-50 MCG/DOSE inhaler Inhale 1 puff into the lungs every 12 hours as needed       gabapentin (NEURONTIN) 300 MG capsule TAKE 1 CAPSULE (300MG) BY MOUTH AT BEDTIME 30 capsule 3     hydrOXYzine (VISTARIL) 25 MG capsule Take 1 capsule (25 mg)  by mouth every 4 hours as needed for anxiety 60 capsule 3     insulin aspart (NOVOLOG FLEXPEN) 100 UNIT/ML pen Inject 24 units under the skin 2 times daily before meals as directed. Take an extra 10 units with sugary foods. 15 mL 1     insulin glargine (LANTUS PEN) 100 UNIT/ML pen Inject 60 Units Subcutaneous every morning       insulin pen needle (NOVOFINE 30) 30G X 8 MM miscellaneous USE 4 DAILY OR AS DIRECTED 400 each 1     ipratropium - albuterol 0.5 mg/2.5 mg/3 mL (DUONEB) 0.5-2.5 (3) MG/3ML neb solution Take 1 vial (3 mLs) by nebulization every 6 hours as needed for shortness of breath / dyspnea or wheezing 30 vial 0     losartan (COZAAR) 100 MG tablet TAKE 1 TABLET (100MG) BY MOUTH DAILY 90 tablet 0     metoprolol succinate ER (TOPROL-XL) 25 MG 24 hr tablet Take 2 tablets (50 mg) by mouth daily 180 tablet 3     miconazole (MICATIN/MICRO GUARD) 2 % external powder Apply topically once as needed       NOVOLOG FLEXPEN 100 UNIT/ML soln INJECT 24 UNITS SUBCUTANEOUSLY THREE TIMES A DAY (WITH MEALS) AND AN EXTRA 10 UNITS WITH SUGARY 15 mL 3     NOVOLOG FLEXPEN 100 UNIT/ML soln Inject 24 Units Subcutaneous 3 times daily (with meals) And an extra 10 units with sugary foods 15 mL 1     nystatin (MYCOSTATIN) 747979 UNIT/GM external cream Apply topically 2 times daily 30 g 3     order for DME Equipment being ordered: Depends. 30 each 4     order for DME Equipment being ordered: CPAP Supplies. 1 each 0     paliperidone ER (INVEGA) 9 MG 24 hr tablet Take 1 tablet (9 mg) by mouth At Bedtime 60 tablet 1     senna-docusate (SENOKOT-S/PERICOLACE) 8.6-50 MG tablet Take 1 tablet by mouth 2 times daily as needed for constipation 60 tablet 10     sertraline (ZOLOFT) 100 MG tablet TAKE 2 TABLETS (200 MG) BY MOUTH DAILY 60 tablet 3     thin (NO BRAND SPECIFIED) lancets Use with lanceting device. To accompany: Blood Glucose Monitor Brands: per insurance. 100 each 6     topiramate (TOPAMAX) 50 MG tablet Take 50mg (1 tab) by mouth in  the am and 100mg (2 tabs) in the pm x 1 week, then increase to 100mg (2 tabs) twice daily. 120 tablet 3     traZODone (DESYREL) 100 MG tablet Take 1 tablet (100 mg) by mouth nightly as needed for sleep 30 tablet 3     TRULICITY 1.5 MG/0.5ML pen Inject 1.5 mg Subcutaneous once a week Every Friday 4 mL 3     zinc oxide (DESITIN) 40 % external ointment Apply topically as needed for dry skin or irritation 56 g 0     Allergies   Allergen Reactions     Imidazole Antifungals Hives     Tolerates diflucan     Ketoprofen Itching     Pruritis to topical     Lisinopril Hives     Metformin Other (See Comments)     Patient hospitalized for lactic acidosis - admitting provider suspectd caused by metformin     Metronidazole Hives     Posaconazole Hives     Tolerates diflucan     Recent Labs   Lab Test 12/01/20  1730 10/04/20  1224 09/15/20  1454 06/30/20  1624 12/22/19  0651 12/22/19  0651 08/06/19  1043 08/06/19  1043 05/24/19  0916 05/24/19  0916   A1C 9.1*  --  9.7* 8.6*  --   --    < > 6.6*  --   --    *  --   --   --   --  86  --  81  --   --    HDL 46*  --   --   --   --  42*  --  39*  --   --    TRIG 141  --   --   --   --  132  --  131  --   --    ALT  --  29 29 35   < > 22   < >  --    < > 30   CR  --  0.86 0.85 0.98   < > 0.86   < >  --    < > 0.87   GFRESTIMATED  --  73 74 63   < > 74   < >  --    < > 73   GFRESTBLACK  --  85 86 73   < > 85   < >  --    < > 85   POTASSIUM  --  4.2 4.4 4.3   < > 4.1   < >  --    < > 4.0   TSH  --   --   --   --   --  1.23  --   --   --  1.62    < > = values in this interval not displayed.      BP Readings from Last 3 Encounters:   03/02/21 120/62   02/02/21 108/66   02/02/21 108/66    Wt Readings from Last 3 Encounters:   03/02/21 98.4 kg (217 lb)   02/02/21 100.4 kg (221 lb 5 oz)   02/02/21 100.4 kg (221 lb 5 oz)          Labs reviewed in EPIC  Problem list, Medication list, Allergies, and Medical/Social/Surgical histories reviewed in EPIC and updated as appropriate.      ROS:  Constitutional, neuro, ENT, endocrine, pulmonary, cardiac, gastrointestinal, genitourinary, musculoskeletal, integument and psychiatric systems are negative, except as otherwise noted above in the HPI.     OBJECTIVE:                                                    /62   Pulse 84   Temp 97.2  F (36.2  C) (Temporal)   Wt 98.4 kg (217 lb)   LMP 01/06/2015 (Exact Date)   SpO2 95%   Breastfeeding No   BMI 42.38 kg/m    Body mass index is 42.38 kg/m .    GENERAL: healthy, alert and no distress  EYES: Eyes grossly normal to inspection, PERRL and conjunctivae and sclerae normal  NECK: no adenopathy, no asymmetry, masses, or scars and thyroid normal to palpation  RESP: lungs clear to auscultation - no rales, rhonchi or wheezes  CV: regular rate and rhythm, normal S1 S2, no S3 or S4, no murmur, click or rub, no peripheral edema and peripheral pulses strong  ABDOMEN: soft, nontender and bowel sounds normal  MS: no gross musculoskeletal defects noted, no edema  NEURO: Normal strength and tone, mentation intact and speech normal    Mental Status Assessment:  Appearance:   Appropriate   Eye Contact:   Good   Psychomotor Behavior: Normal   Attitude:   Cooperative   Orientation:   All  Speech   Rate / Production: Normal    Volume:  Normal   Mood:    Depressed   Affect:    Appropriate  Tearful  Thought Content:  Clear   Thought Form:  Coherent  Logical   Insight:    Fair   Attention Span and Concentration:  limited  Recent and Remote Memory:  intact  Fund of Knowledge: appropriate  Muscle Strength and Tone: normal   Suicidal Ideation: reports no thoughts, no intention  Hallucination: Yes    See [facility] [place] notes     Diagnostic Test Results:  Labs reviewed in Epic  none

## 2021-03-02 NOTE — PROGRESS NOTES
"Patient Name: Nena Tang      YOB: 1960     Medical Record Number: [id number]  Diagnosis:    Chronic pain syndrome  Fibromyalgia  Chronic bilateral low back pain without sciatica    Visit Info Length of Visit: 45 min  Therapeutic Procedures/Exercises: 35min; Therapeutic Activities: NA; Neuromuscular Re-education: NA;  Self Care / Home Management Training: 10 min     Exercise/Activity Education Instruction:  Home Exercise Program:   - instructed in benefits of consistent exercise/movement as it relates to pain management and mood regulation (increase endorphins, aid with sleep, build energy reserve and endurance, improve flexibility for improved posture and body mechanics). Discussed joint lubrication/motion is lotion concept.   - attempted to instruct in hip stretches but pt did not tolerate well (pretzel str, hip ext str, bent knee fall out).  Switched to basic ROM for hips with combined hip flex/ext with hip ER/IR.  Required max v.cues and manual assist initially to coordinate and move her LEs.  By end she was able to demonstrate ex with only intermittent v.cues.  She reported her hips / LEs felt better after moving.  Handouts given for this exercise.  Self Care:   - encouraged to continue with use of heat to her knees  - encouraged to use her 4ww for all community ambulation     Notes: Pt arrived 30 min late - able to accommodate full session  Patient reports:   - her legs are quite sore today, right > left - did a lot of walking at [employer] to play bingo yesterday - didn't have her walker  - slipped and fell down 4 steps today - had to work hard to get herself back up - 15 y/o granddtr helped her  - knees and hips more sore overall  - goes to a \"drop in program\" 3x/wk - mostly does crafts, some exs - they lead in standing - she tries but has a hard time tolerating    Activity tolerance: sometimes needs help with bed mobility, increased pain in LEs with community ambulation distances " "    HEP/Self Care/Home Management Training:   HEP takes short walks in hallway; exs 3x/wk as part of \"drop in program\" - doesn't always have ability to keep up however  Self Care: tried using the gel pack for heat on her knees, didn't notice much difference; tried ice to but didn't like ice   Body Awareness: low; no practices    Pt is pleasant and engaged - however, appears tired and needs cues / encouragement throughout session to keep going.  Requires min assist sit to supine and mod assist supine to sit.      Demonstrates/Verbalizes Technique: 2 (1= poor technique-difficulty performing exercises,significant cues required; 5= good technique-performs exercises without cues)  Body Awareness: 2 (1=low awareness; 5=high awareness)  Posture/Stability: 2 (1= poor posture, stability; 5= good posture, stability)   Motivational Level:   Cooperative Participation:  Full   Patient Satisfaction:  satisfied   Response to Teaching:   demonstrated ability and verbalized, recall/understanding  Factors that affect learning: ?slow processing?   Plan of Care  Cont PT to support reactivation and integration of self regulation pain management skills at QOW frequency. (next visit consider: continue with HEP progression.)   Therapist: Pam Be, PT                  Date: 3/2/2021         "

## 2021-03-02 NOTE — LETTER
[facility] INTEGRATED PRIMARY CARE  03/02/21    Patient: Nena Tang  YOB: 1960  Medical Record Number: [id number]                                                                  Opioid / Opioid Plus Controlled Substance Agreement    I understand that my care provider has prescribed an opioid (narcotic) controlled substance to help manage my condition(s). I am taking this medicine to help me function or work. I know this is strong medicine, and that it can cause serious side effects. Opioid medicine can be sedating, addicting and may cause a dependency on the drug. They can affect my ability to drive or think, and cause depression. They need to be taken exactly as prescribed. Combining opioids with certain medicines or chemicals (such as cocaine, sedatives and tranquilizers, sleeping pills, meth) can be dangerous or even fatal. Also, if I stop opioids suddenly, I may have severe withdrawal symptoms. Last, I understand that opioids do not work for all types of pain nor for all patients. If not helpful, I may be asked to stop them.        The risks, benefits, and side effects of these medicine(s) were explained to me. I agree that:    1. I will take part in other treatments as advised by my care team. This may be psychiatry or counseling, physical therapy, behavioral therapy, group treatment or a referral to a pain clinic. I will reduce or stop my medicine when my care team tells me to do so.  2. I will take my medicines as prescribed. I will not change the dose or schedule unless my care team tells me to. There will be no refills if I  run out early.   I may be contactedwithout warning and asked to complete a urine drug test or pill count at any time.   3. I will keep all my appointments, and understand this is part of the monitoring of opioids. My care team may require an office visit for EVERY opioid/controlled substance refill. If I miss appointments or don t follow instructions,  my care team may stop my medicine.  4. I will not ask other providers to prescribe controlled substances, and I will not accept controlled substances from other people. If I need another prescribed controlled substance for a new reason, I will tell my care team within 1 business day.  5. I will use one pharmacy to fill all of my controlled substance prescriptions, and it is up to me to make sure that I do not run out of my medicines on weekends or holidays. If my care team is willing to refill my opioid prescription without a visit, I must request refills only during office hours, refills may take up to 3 days to process, and it may take up to 5 to 7 days for my medicine to be mailed and ready at my pharmacy. Prescriptions will not be mailed anywhere except my pharmacy.        [id number]  Rev 12/18         Registration to scan to EHR                             Page 1 of 2               Controlled Substance Agreement [facility] PRIMARY CARE  03/02/21  Patient: Nena Tang  YOB: 1960  Medical Record Number: [id number]                                                                  6. I am responsible for my prescriptions. If the medicine/prescription is lost or stolen, it will not be replaced. I also agree not to share controlled substance medicines with anyone.  7. I agree to not use ANY illegal or recreational drugs. This includes marijuana, cocaine, bath salts or other drugs. I agree not to use alcohol unless my care team says I may.          I agree to give urine samples whenever asked. If I don t give a urine sample, the care team may stop my medicine.    8. If I enroll in the [place] [facility] Marijuana program, I will tell my care team. I will also sign an agreement to share my medical records with my care team.   9. I will bring in my list of medicines (or my medicine bottles) each time I come to the clinic.   10. I will tell my care team right away if I  become pregnant or have a new medical problem treated outside of my regular clinic.  11. I understand that this medicine can affect my thinking and judgment. It may be unsafe for me to drive, use machinery and do dangerous tasks. I will not do any of these things until I know how the medicine affects me. If my dose changes, I will wait to see how it affects me. I will contact my care team if I have concerns about medicine side effects.    I understand that if I do not follow any of the conditions above, my prescriptions or treatment may be stopped.      I agree that my provider, clinic care team, and pharmacy may work with any city, state or federal law enforcement agency that investigates the misuse, sale, or other diversion of my controlled medicine. I will allow my provider to discuss my care with or share a copy of this agreement with any other treating provider, pharmacy or emergency room where I receive care. I agree to give up (waive) any right of privacy or confidentiality with respect to these consents.     I have read this agreement and have asked questions about anything I did not understand.      ________________________________________________________________________  Patient signature - Date/Time -  Nena Tang                                      ________________________________________________________________________  Witness signature                                                            ________________________________________________________________________  Provider signature - ART Fay CNP      [id number]  Rev 12/18         Registration to scan to EHR                         Page 2 of 2                   Controlled Substance Agreement Opioid           Page 1 of 2  Opioid Pain Medicines (also known as Narcotics)  What You Need to Know    What are opioids?   Opioids are pain medicines that must be prescribed by a doctor.  They are also known as narcotics.    Examples are:      morphine (MS Contin, Magi)    oxycodone (Oxycontin)    oxycodone and acetaminophen (Percocet)    hydrocodone and acetaminophen (Vicodin, Norco)     fentanyl patch (Duragesic)     hydromorphone (Dilaudid)     methadone     What do opioids do well?   Opioids are best for short-term pain after a surgery or injury. They also work well for cancer pain. Unlike other pain medicines, they do not cause liver or kidney failure or ulcers. They may help some people with long-lasting (chronic) pain.     What do opioids NOT do well?   Opioids never get rid of pain entirely, and they do not work well for most patients with chronic pain. Opioids do not reduce swelling, one of the causes of pain. They also don t work well for nerve pain.                           For informational purposes only.  Not to replace the advice of your care provider.  Copyright [address] [facility]. All right reserved. [employer] [id number] 02/18.      Page 2 of 2    Risks and side effects   Talk to your doctor before you start or decide to keep taking one of these medicines. Side effects include:    Lowering your breathing rate enough to cause death    Overdose, including death, especially if taking higher than prescribed doses    Long-term opioid use    Worse depression symptoms; less pleasure in things you usually enjoy    Feeling tired or sluggish    Slower thoughts or cloudy thinking    Being more sensitive to pain over time; pain is harder to control    Trouble sleeping or restless sleep    Changes in hormone levels (for example, less testosterone)    Changes in sex drive or ability to have sex    Constipation    Unsafe driving    Itching and sweating    Feeling dizzy    Nausea, vomiting and dry mouth    What else should I know about opioids?  When someone takes opioids for too long or too often, they become dependent. This means that if you stop or reduce the medicine too quickly, you will have withdrawal symptoms.    Dependence is not the  same as addiction. Addiction is when people keep using a substance that harms their body, their mind or their relations with others. If you have a history of drug or alcohol abuse, taking opioids can cause a relapse.    Over time, opioids don t work as well. Most people will need higher and higher doses. The higher the dose, the more serious the side effects. We don t know the long-term effects of opioids.      Prescribed opioids aren't the best way to manage chronic pain    Other ways to manage pain include:      Ibuprofen or acetaminophen.  You should always try this first.      Treat health problems that may be causing pain.      acupuncture or massage, deep breathing, meditation, visual imagery, aromatherapy.      Use heat or ice at the pain site      Physical therapy and exercise      Stop smoking      See a counselor or therapist                                                  People who have used opioids for a long time may have a lower quality of life, worse depression, higher levels of pain and more visits to doctors.    Never share your opioids with others. Be sure to store opioids in a secure place, locked if possible.Young children can easily swallow them and overdose.     You can overdose on opioids.  Signs of overdose include decrease or loss of consciousness, slowed breathing, trouble waking and blue lips.  If someone is worried about overdose, they should call 911.    If you are at risk for overdose, you may get naloxone (Narcan, a medicine that reverses the effects of opioids.  If you overdose, a friend or family member can give you Narcan while waiting for the ambulance.  They need to know the signs of overdose and how to give Narcan.    While you're taking opioids:    Don't use alcohol or street drugs. Taking them together can cause death.    Don't take any of these medicines unless your doctor says its okay.  Taking these with opioids can cause death.    Benzodiazepines (such as lorazepam          or diazepam)    Muscle relaxers (such as cyclobenzaprine)    sleeping pills    other opioids    Safe disposal of opioids  Find your area drug take-back program, your pharmacy mail-back program, buy a special disposal bag (such as Deterra) from your pharmacy or flush them down the toilet.  Use the guidelines at:  www.fda.gov/drugs/resourcesforyou

## 2021-03-03 ENCOUNTER — VIRTUAL VISIT (OUTPATIENT)
Dept: PALLIATIVE MEDICINE | Facility: CLINIC | Age: 61
End: 2021-03-03
Payer: COMMERCIAL

## 2021-03-03 DIAGNOSIS — M54.50 CHRONIC RIGHT-SIDED LOW BACK PAIN WITHOUT SCIATICA: Primary | ICD-10-CM

## 2021-03-03 DIAGNOSIS — G89.29 CHRONIC RIGHT-SIDED LOW BACK PAIN WITHOUT SCIATICA: Primary | ICD-10-CM

## 2021-03-03 DIAGNOSIS — M79.18 MYOFASCIAL PAIN: ICD-10-CM

## 2021-03-03 PROCEDURE — 96156 HLTH BHV ASSMT/REASSESSMENT: CPT | Mod: TEL | Performed by: PSYCHOLOGIST

## 2021-03-05 ENCOUNTER — TELEPHONE (OUTPATIENT)
Dept: FAMILY MEDICINE | Facility: CLINIC | Age: 61
End: 2021-03-05

## 2021-03-05 NOTE — TELEPHONE ENCOUNTER
Pt can obtain CPAP supplies from  DME, they reached out to Pt yesterday and are waiting for a call back. Pt will call them back.   Spoke with home care nurse Lanny, she will f/u with Pt on Wed to make sure the CPAP supplies are going to be delivered.     Manuel Corey RN   [facility]

## 2021-03-08 ENCOUNTER — TELEPHONE (OUTPATIENT)
Dept: FAMILY MEDICINE | Facility: CLINIC | Age: 61
End: 2021-03-08

## 2021-03-10 ENCOUNTER — VIRTUAL VISIT (OUTPATIENT)
Dept: PSYCHIATRY | Facility: CLINIC | Age: 61
End: 2021-03-10
Payer: COMMERCIAL

## 2021-03-10 DIAGNOSIS — F20.89 OTHER SCHIZOPHRENIA (H): Primary | ICD-10-CM

## 2021-03-10 PROCEDURE — 99442 PR PHYSICIAN TELEPHONE EVALUATION 11-20 MIN: CPT | Performed by: PSYCHIATRY & NEUROLOGY

## 2021-03-10 RX ORDER — ESCITALOPRAM OXALATE 10 MG/1
10 TABLET ORAL DAILY
Qty: 30 TABLET | Refills: 3 | Status: SHIPPED | OUTPATIENT
Start: 2021-03-10 | End: 2021-07-19

## 2021-03-10 NOTE — PROGRESS NOTES
Visit Date:   03/10/2021      PSYCHIATRIC CONSULTATION      IDENTIFICATION:  Ms. Tang is a 61-year-old female who carries diagnoses of schizoaffective disorder and also diabetes.      HISTORY OF PRESENT ILLNESS:  Recently, Ms. Tang has been increasingly depressed.  She also had some word-finding difficulties and during the beginning of our conversation had a lot of difficulty expressing herself.  I asked to talk to her sister who lives with her.  Her sister reports that she sometimes has memory problems and sometimes word-finding difficulties but nothing out of the ordinary.  Sister reports she has been more depressed since the anniversary of her 's death, and the sister and the patient both think that Zoloft is no longer helping.  The patient really has no idea what medications she takes.  They are set up by her nurse on Wednesdays, and the patient goes to a program at a drop-in center 3 days a week, so she does get some socialization.  While she is at the drop-in center, she is encouraged to walk around with her walker.  At home, she is getting almost zero exercise.  I strongly encouraged her to go out with her walker on nice days and try to get some exercise.  Today, we also discontinued her Zoloft and started Lexapro 10 mg in the morning.  I am hopeful that this will help with her depression.  She is not having any current psychotic symptoms.  In the past she felt there was some sort of double spirit bothering her at night, but she reports that is resolved.  She has been taking her Invega.      MENTAL STATUS EXAMINATION:  Today, the patient was pleasant and cooperative but initially really having difficulty making herself understood.  She was apparently quite anxious because as the appointment progressed, she became better and better able to communicate.  I am sure she has some underlying cognitive deficits, but her sister does not think that they are particularly out of the ordinary at the present  time.  The patient's mood is clearly depressed.  Her affect was sad.  Her speech improved throughout the appointment and became goal oriented with tight associations and logical and linear thought process.  Content of thought was without psychosis or suicidal ideation.  Recent memory was apparently somewhat impaired.  Remote memory, fund of knowledge and use of language were at baseline.  She is alert and oriented x 3.  Insight and judgment are currently at her baseline.      ASSESSMENT:  Schizoaffective disorder, currently depressed.      RECOMMENDATIONS:  Continue her medications except discontinue Zoloft and start Lexapro 10 mg.  If this is not helpful, we can consider Effexor in the future.  I note that her creatinine and creatinine clearance are still within normal limits.      This was a telephone visit.  It lasted from 2:00-2:15.         FERNANDO MARCIAL MD             D: 03/10/2021   T: 03/10/2021   MT: JATINDER      Name:     ERIK BURNHAM   MRN:      [id number]        Account:      [id number]   :      1960           Visit Date:   03/10/2021      Document: [id number]

## 2021-03-15 ENCOUNTER — MEDICAL CORRESPONDENCE (OUTPATIENT)
Dept: HEALTH INFORMATION MANAGEMENT | Facility: CLINIC | Age: 61
End: 2021-03-15

## 2021-03-16 ENCOUNTER — OFFICE VISIT (OUTPATIENT)
Dept: PALLIATIVE MEDICINE | Facility: CLINIC | Age: 61
End: 2021-03-16
Payer: COMMERCIAL

## 2021-03-16 ENCOUNTER — OFFICE VISIT (OUTPATIENT)
Dept: PHARMACY | Facility: CLINIC | Age: 61
End: 2021-03-16
Payer: COMMERCIAL

## 2021-03-16 VITALS
SYSTOLIC BLOOD PRESSURE: 128 MMHG | OXYGEN SATURATION: 96 % | DIASTOLIC BLOOD PRESSURE: 70 MMHG | TEMPERATURE: 97 F | BODY MASS INDEX: 42.58 KG/M2 | HEART RATE: 86 BPM | WEIGHT: 218 LBS

## 2021-03-16 DIAGNOSIS — Z79.4 TYPE 2 DIABETES MELLITUS WITH MILD NONPROLIFERATIVE RETINOPATHY WITHOUT MACULAR EDEMA, WITH LONG-TERM CURRENT USE OF INSULIN, UNSPECIFIED LATERALITY (H): Primary | ICD-10-CM

## 2021-03-16 DIAGNOSIS — G89.4 CHRONIC PAIN SYNDROME: Primary | ICD-10-CM

## 2021-03-16 DIAGNOSIS — M54.50 CHRONIC BILATERAL LOW BACK PAIN WITHOUT SCIATICA: ICD-10-CM

## 2021-03-16 DIAGNOSIS — M79.7 FIBROMYALGIA: ICD-10-CM

## 2021-03-16 DIAGNOSIS — G89.29 CHRONIC BILATERAL LOW BACK PAIN WITHOUT SCIATICA: ICD-10-CM

## 2021-03-16 DIAGNOSIS — E11.3299 TYPE 2 DIABETES MELLITUS WITH MILD NONPROLIFERATIVE RETINOPATHY WITHOUT MACULAR EDEMA, WITH LONG-TERM CURRENT USE OF INSULIN, UNSPECIFIED LATERALITY (H): Primary | ICD-10-CM

## 2021-03-16 PROCEDURE — 97110 THERAPEUTIC EXERCISES: CPT | Mod: GP | Performed by: PHYSICAL THERAPIST

## 2021-03-16 PROCEDURE — 97535 SELF CARE MNGMENT TRAINING: CPT | Mod: GP | Performed by: PHYSICAL THERAPIST

## 2021-03-16 PROCEDURE — 99207 PR NO CHARGE LOS: CPT | Performed by: PHARMACIST

## 2021-03-16 NOTE — PROGRESS NOTES
Clinical Pharmacy Consult:                                                    Nena Tang is a 61 year old female coming in for a clinical pharmacist consult.  She was referred to me from Rowena Haas.     Reason for Consult: Freestyle Gabby teaching    Discussion: education on how to use Freestyle Gabby. Patient brought equipment to the visit, set up glucometer for her and applied first sensor together.     Plan:  1. Start scanning Gabby sensor with meals and at bedtime  2. Ask for sister or RN to assist with placement of next sensor in 14 days  3. Ask for Covid vaccine at the [facility] in center    Eugenia De Jesus, PharmD, BCACP

## 2021-03-16 NOTE — PROGRESS NOTES
"Patient Name: Nena Tang      YOB: 1960     Medical Record Number: [id number]  Diagnosis:    Chronic pain syndrome  Fibromyalgia  Chronic bilateral low back pain without sciatica    Visit Info Length of Visit: 45 min  Therapeutic Procedures/Exercises: 30 min; Therapeutic Activities: NA; Neuromuscular Re-education: NA;  Self Care / Home Management Training: 15 min     Exercise/Activity Education Instruction:  Home Exercise Program:   - Hallway walk using 4ww - 600 ft in 7'45\" - took a standing pause break at midway point.  Continue to encourage walking program with 4ww - as weather gets nicer consider walking outside.  - Reviewed supine heel slide - pt forgot to combine with hip ER/IR. Required mod verbal cues, min assist with right LE.  Modified to standard HS as it was easier for pt. (vs combo)  - Instructed in sit to stand exercise.  Used one pillow on chair to make it challenging but more comfortable on her hips.  Performed 7 reps.  Start with 5-7 reps at home, progress reps as efren  - consider ways she can modify exs at drop in [place] (vs all or nothing approach) - discussed doing them seated, make smaller ROM, lower reps, go slower - any way she can modify to enable participation is more beneficial than not doing them at all when they feel to hard.  Self Care:   - Continue to encourage pt to use her walker when out in community (going to drop in center, out with family etc).  Pt reported feeling the benefit when using walker during PT.  Discussed positive benefits - EC, greater stability, less fatigue, potential to go further or for longer periods of time.    - Instructed to try ice or heat to her hips for pain relief.       Notes:   Patient reports:   - \"everything is about the same\"  - continues to go to drop in center; went bowling over interval as one of the center's activities    HEP/Self Care/Home Management Training:   HEP tried some of the heel slides on her own at home; occasional " walks in hallway of her apt  Self Care: not using her walker as much as she might benefit from - pt independently acknowledged this during today's session.   Body Awareness: none     Demonstrates/Verbalizes Technique: 3 (1= poor technique-difficulty performing exercises,significant cues required; 5= good technique-performs exercises without cues)  Body Awareness: 2 (1=low awareness; 5=high awareness)  Posture/Stability: 3 (1= poor posture, stability; 5= good posture, stability)   Motivational Level:   Cooperative Participation:  Full   Patient Satisfaction:  satisfied   Response to Teaching:   demonstrated ability and verbalized, recall/understanding  Factors that affect learning: low self motivation, slow processor / learner   Plan of Care  Cont PT to support reactivation and integration of self regulation pain management skills. (next visit consider: continue with HEP progression.) F/u in clinic in one week.   Therapist: Pam eB, PT                  Date: 3/16/2021

## 2021-03-17 ENCOUNTER — TELEPHONE (OUTPATIENT)
Dept: FAMILY MEDICINE | Facility: CLINIC | Age: 61
End: 2021-03-17

## 2021-03-17 NOTE — TELEPHONE ENCOUNTER
Forms completed and faxed back to [phone]. Placed in abstraction to be scanned into patient's chart.

## 2021-03-18 ENCOUNTER — MEDICAL CORRESPONDENCE (OUTPATIENT)
Dept: HEALTH INFORMATION MANAGEMENT | Facility: CLINIC | Age: 61
End: 2021-03-18

## 2021-03-19 ENCOUNTER — TELEPHONE (OUTPATIENT)
Dept: FAMILY MEDICINE | Facility: CLINIC | Age: 61
End: 2021-03-19

## 2021-03-19 NOTE — TELEPHONE ENCOUNTER
Forms completed and faxed back to [facility] [phone]. Placed into Abstraction to scan into patients chart.   Michael Gustafson MA

## 2021-03-24 DIAGNOSIS — F25.1 SCHIZOAFFECTIVE DISORDER, DEPRESSIVE TYPE (H): ICD-10-CM

## 2021-03-26 RX ORDER — PALIPERIDONE 9 MG/1
TABLET, EXTENDED RELEASE ORAL
Qty: 30 TABLET | Refills: 2 | Status: SHIPPED | OUTPATIENT
Start: 2021-03-26 | End: 2021-07-06

## 2021-03-26 NOTE — TELEPHONE ENCOUNTER
Rowena,    Requested Prescriptions   Pending Prescriptions Disp Refills     paliperidone ER (INVEGA) 9 MG 24 hr tablet [Pharmacy Med Name: Paliperidone ER 9MG TB24] 30 tablet 2     Sig: TAKE 1 TABLET BY MOUTH AT BEDTIME       There is no refill protocol information for this order        LOV: 3/2/21  Future: 4/6/21    Routing refill request to provider for review/approval because:  Drug not on the [facility] refill protocol     Crissy Pulido RN  [facility]

## 2021-03-30 ENCOUNTER — TELEPHONE (OUTPATIENT)
Dept: PALLIATIVE MEDICINE | Facility: CLINIC | Age: 61
End: 2021-03-30

## 2021-03-30 ENCOUNTER — OFFICE VISIT (OUTPATIENT)
Dept: PALLIATIVE MEDICINE | Facility: CLINIC | Age: 61
End: 2021-03-30
Payer: COMMERCIAL

## 2021-03-30 VITALS — OXYGEN SATURATION: 98 % | DIASTOLIC BLOOD PRESSURE: 76 MMHG | HEART RATE: 94 BPM | SYSTOLIC BLOOD PRESSURE: 122 MMHG

## 2021-03-30 DIAGNOSIS — M79.7 FIBROMYALGIA: ICD-10-CM

## 2021-03-30 DIAGNOSIS — M53.3 SI (SACROILIAC) JOINT DYSFUNCTION: Primary | ICD-10-CM

## 2021-03-30 DIAGNOSIS — M70.61 TROCHANTERIC BURSITIS OF RIGHT HIP: ICD-10-CM

## 2021-03-30 PROCEDURE — 99213 OFFICE O/P EST LOW 20 MIN: CPT | Performed by: PSYCHIATRY & NEUROLOGY

## 2021-03-30 RX ORDER — TOPIRAMATE 50 MG/1
TABLET, FILM COATED ORAL
Qty: 180 TABLET | Refills: 3 | Status: SHIPPED | OUTPATIENT
Start: 2021-03-30 | End: 2021-07-20 | Stop reason: DRUGHIGH

## 2021-03-30 ASSESSMENT — PAIN SCALES - GENERAL: PAINLEVEL: SEVERE PAIN (7)

## 2021-03-30 NOTE — PROGRESS NOTES
[facility]    Date of visit: 3/30/2021     Chief complaint:   Chief Complaint   Patient presents with     Pain      Interval history:  Nena Tang was last seen by me on 1/26/2021    Recommendations/plan at the last visit included:  1. Physical Therapy: continue  2. Pain Psychologist to address issues of relaxation, behavioral change, coping style, and other factors important to improvement: patient agreeable to try to participate with pain psych.- yes  3. Diagnostic Studies: none  4. Urine toxicology screen: none   5. Medication Management:   1. Titration of topamax provided to 100mg BID  6. Further procedures recommended: Possible b/l SI joint, once widespread pain is better controlled  7. Other treatments: none  8. Recommendations/follow-up for PCP:  none  9. Follow up: 6-8 weeks    Since her last visit, Nena Tang reports  - psychiatry eval 3/10, discontinued Zoloft and started Lexapro 10mg   - continues to endorse generalized pain and weakness  - Home health care assistance for medication management is provided  - Continues to take topiramate, without any side effects. However, has lost some weight, could be contributed to medication  -Today she complains of right-sided low back pain and bilateral knee pain  - Denies any new weaknesses, balance issues, bowel or bladder incontinence    Pain scores:  Pain intensity on average is 7 on a scale of 0-10.     Current pain medications include:  - Gabapentin 300 mg at bedtime -  sedation so concerned about adding in daytime doses.  -topamax 50mg BID  Robaxin (methocarbamol) 1000mg dose- not helpful    Previous medication treatments included:  - Tylenol 650 mg prn - no benefit  - Diclofenac gel - does not take   - Flexeril - no benefit    Other treatments have included:  Nena Tang has not been seen at a pain clinic in the past.    PT: yes, last session 6/2019 outpatient and most recent home PT a couple of months ago  3-4 sessions  Acupuncture: none  Chiropractor: none  TENs Unit: none  Injections: shoulder injection does not remember when - no benefit    Side Effects: none    Medications:  Current Outpatient Medications   Medication Sig Dispense Refill     acetaminophen (TYLENOL) 650 MG CR tablet Take 1 tablet (650 mg) by mouth every 8 hours as needed for mild pain or fever 120 tablet 1     alcohol swab prep pads Use to swab area of injection/rodolfo as directed. 100 each 3     amantadine (SYMMETREL) 100 MG capsule Take 1 capsule (100 mg)  in the morning and 2 capsules (200 mg) in the evening. 60 capsule 3     aspirin (ASA) 81 MG EC tablet TAKE 1 TABLET (81MG) BY MOUTH DAILY 90 tablet 3     atorvastatin (LIPITOR) 80 MG tablet TAKE 1 TABLET (80MG) BY MOUTH DAILY 30 tablet 11     blood glucose (NO BRAND SPECIFIED) test strip Use to test blood sugar 4 times daily or as directed. 100 strip 11     clonazePAM (KLONOPIN) 0.5 MG tablet Take 1 tablet (0.5 mg) by mouth At Bedtime 30 tablet 3     Continuous Blood Gluc Sensor (FREESTYLE LEBRON 14 DAY SENSOR) MIS 1 Device every 14 days Change sensor as directed every 14 days 2 each 11     diclofenac (VOLTAREN) 1 % topical gel Place 4 g onto the skin 4 times daily as needed for moderate pain       escitalopram (LEXAPRO) 10 MG tablet Take 1 tablet (10 mg) by mouth daily 30 tablet 3     famotidine (PEPCID) 20 MG tablet Take 1 tablet (20 mg) by mouth daily 90 tablet 0     fluticasone-salmeterol (ADVAIR) 250-50 MCG/DOSE inhaler Inhale 1 puff into the lungs every 12 hours as needed       gabapentin (NEURONTIN) 300 MG capsule TAKE 1 CAPSULE (300MG) BY MOUTH AT BEDTIME 30 capsule 3     hydrOXYzine (VISTARIL) 25 MG capsule Take 1 capsule (25 mg) by mouth every 4 hours as needed for anxiety 60 capsule 3     insulin glargine (LANTUS PEN) 100 UNIT/ML pen Inject 60 Units Subcutaneous every morning       insulin pen needle (NOVOFINE 30) 30G X 8 MM miscellaneous USE 4 DAILY OR AS DIRECTED 400 each 1      ipratropium - albuterol 0.5 mg/2.5 mg/3 mL (DUONEB) 0.5-2.5 (3) MG/3ML neb solution Take 1 vial (3 mLs) by nebulization every 6 hours as needed for shortness of breath / dyspnea or wheezing 30 vial 0     losartan (COZAAR) 100 MG tablet TAKE 1 TABLET (100MG) BY MOUTH DAILY 90 tablet 0     metoprolol succinate ER (TOPROL-XL) 25 MG 24 hr tablet Take 2 tablets (50 mg) by mouth daily 180 tablet 3     miconazole (MICATIN/MICRO GUARD) 2 % external powder Apply topically once as needed       NOVOLOG FLEXPEN 100 UNIT/ML soln INJECT 24 UNITS SUBCUTANEOUSLY THREE TIMES A DAY (WITH MEALS) AND AN EXTRA 10 UNITS WITH SUGARY 15 mL 3     nystatin (MYCOSTATIN) 928447 UNIT/GM external cream Apply topically 2 times daily 30 g 3     order for DME Equipment being ordered: Depends. 30 each 4     order for DME Equipment being ordered: CPAP Supplies. 1 each 0     paliperidone ER (INVEGA) 9 MG 24 hr tablet TAKE 1 TABLET BY MOUTH AT BEDTIME 30 tablet 2     senna-docusate (SENOKOT-S/PERICOLACE) 8.6-50 MG tablet Take 1 tablet by mouth 2 times daily as needed for constipation 60 tablet 10     thin (NO BRAND SPECIFIED) lancets Use with lanceting device. To accompany: Blood Glucose Monitor Brands: per insurance. 100 each 6     topiramate (TOPAMAX) 50 MG tablet Take 50mg (1 tab) by mouth in the am and 100mg (2 tabs) in the pm x 1 week, then increase to 100mg (2 tabs) twice daily. 120 tablet 3     traZODone (DESYREL) 100 MG tablet Take 1 tablet (100 mg) by mouth nightly as needed for sleep 30 tablet 3     TRULICITY 1.5 MG/0.5ML pen Inject 1.5 mg Subcutaneous once a week Every Friday 4 mL 3     zinc oxide (DESITIN) 40 % external ointment Apply topically as needed for dry skin or irritation 56 g 0       Medical History: any changes in medical history since they were last seen? No    Physical Exam:  Vitals:    03/30/21 1544   BP: 122/76   Pulse: 94   SpO2: 98%     Exam:  Constitutional: Alert, no distress  Head: normocephalic. Atraumatic.   Eyes: no  redness or jaundice noted   Cardiovascular: RRR   Respiratory: clear b/l  Gastrointestinal:  soft   : deferred  Skin: no lesions, dry and warm  APPEARANCE: normal affect    Musculoskeletal exam:  Gait/Station/Posture:  upright, nonantalgic  Cervical spine: Limited range of motion    Lumbar spine: Very challenging to assess range of motion, patient with significant mid girth.    Myofascial tenderness: Generalized tenderness to palpation throughout the lumbar paraspinal musculature     SIJ: Significant tenderness to palpation to PSIS bilaterally L>R, positive Kari finger sign.  Unable to assess other SI joint exams due to patient ability    Hips: Moderate to severe tenderness to trochanteric bursa left side greater than right    Neurologic exam:  CN:  Cranial nerves 2-12 are grossly normal  Motor: Impersistence inhibition with all muscle groups, at times strength is 5 out of 5.  However, patient would slowly stop to put forth resistance   Biceps:     R:  4/5  L: 4/5   Triceps  R:  4/5  L: 4/5      R:  4/5  L: 4/5   Finger flexion and    extension  R:  4/5  L: 4/5     Hip flexors  R: 4/5  L: 4/5   Knee extensors R: 4/5  L: 4/5   Dorsiflexion  R: 4/5  L: 4/5   Plantarflexion  R: 5/5  L: 5/5    Reflexes:     Biceps:     R:  1/4  L: 1/4   Brachioradialis   R:  0/4  L: 0/4   Triceps:  R:  0/4  L: 0/4   Patella:  R:  1/4  L: 1/4   Achilles:  R: 1/4  L: 1/4    Sensory:  (upper and lower extremities):   Light touch: normal    Allodynia: absent     Assessment:   1.  Widespread pain, she does meet 2010 fibromyalgia diagnostic criteria. Patient continues to endorse generalized tenderness, soreness and pain to low back, upper and lower extremities and shoulders. Currently on gabapentin, however unable to tolerate sedation SE and therefore only taking it at night. Would benefit from something less sedating.  2. Chronic low back pain- positive kari finger and severe tenderness to palpation to PSIS point more to SI joint  pathology.  She also has significant tenderness to palpation to bilateral trochanteric bursa  3. Deconditioning -she has lost some weight, most likely due to current topiramate use.  However, patient unable to perform minimal tasks due to poor exercise tolerance      Plan:  1. Physical Therapy: continue HEP  2. Pain Psychologist to address issues of relaxation, behavioral change, coping style, and other factors important to improvement: patient agreeable to try to participate with pain psych.- yes, has been following with Dr. Kohler pain psychologist and Dr. Brothers psychiatrist for depression  3. Diagnostic Studies: none  4. Urine toxicology screen: none   5. Medication Management: Titration of topamax provided.  Goal is 150 twice daily  6. Further procedures recommended:  Referral placed for bilateral SI joint injection and bilateral trochanteric bursa injection  7. Other treatments: none  8. Recommendations/follow-up for PCP:  none  9. Follow up: 6- weeks virtual    25 minutes spent on the date of encounter doing chart review, history, and exam documentation and further activities as noted above.    Shell Paz MD  [facility]   Armando Fu MD pain fellow

## 2021-03-30 NOTE — TELEPHONE ENCOUNTER
Screening Questions for Radiology Injections:    Injection to be done at which interventional clinic site? [facility]    If [facility] location, tell patient that this procedure requires a COVID-19 lab test be done within 4 days of the procedure. Would you still like to move forward with scheduling the procedure?  Not Applicable   If YES, let patient know that someone will call them to schedule the COVID-19 test and that they will only receive a call back if the result is positive. Route to nursing to enter order.     Instruct patient to arrive as directed prior to the scheduled appointment time:    [place] minutes before      [place]: 30 minutes before; if IV needed 1 hour before     Procedure ordered by Brandi    Procedure ordered? right SI joint and right trochanteric bursa injection      Transforaminal Cervical FREDERICK - no pain provider currently performing    As a reminder, receiving steroids can decrease your body's ability to fight infection.   Would you still like to move forward with scheduling the injection?  Yes    What insurance would patient like us to bill for this procedure? [employer]      Worker's comp or MVA (motor vehicle accident) -Any injection DO NOT SCHEDULE and route to Karma Gill.      [employer] insurance - For SI joint injections, DO NOT SCHEDULE and route Karma Gill.       ALL BCBS, [employer] and [employer]-Route to Karma for review DO NOT SCHEDULE      IF SCHEDULING IN [place] AND NEEDS A PA, IT IS OKAY TO SCHEDULE. [place] HANDLES THEIR OWN PA'S AFTER THE PATIENT IS SCHEDULED. PLEASE SCHEDULE AT LEAST 1 WEEK OUT SO A PA CAN BE OBTAINED.    Any chance of pregnancy? NO   If YES, do NOT schedule and route to  pool    Is an  needed? No     Patient has a drive home? (mandatory) YES: INFORMED    Is patient taking any blood thinners (i.e. plavix, coumadin, jantoven, warfarin, heparin, pradaxa or dabigatran, etc)? No   If hold needed, do  NOT schedule, route to RN pool     Is patient taking any aspirin products (includes Excedrin and Fiorinal)? No     If more than 325mg/day, OK to schedule; Instruct pt to decrease to less than 325 mg for 7 days AND route to RN pool    For CERVICAL procedures, hold all aspirin products for 6 days.     Tell pt that if aspirin product is not held for 6 days, the procedure WILL BE cancelled.      Does the patient have a bleeding or clotting disorder? No     If YES, okay to schedule AND route to RN nurse pool    For any patients with platelet count <100, must be forwarded to provider    Is patient diabetic?  Yes  If YES, instruct them to bring their glucometer.    Does patient have an active infection or treated for one within the past week? No     Is patient currently taking any antibiotics?  No     For patients on chronic, preventative, or prophylactic antibiotics, procedures may be scheduled.     For patients on antibiotics for active or recent infection:antibiotic course must have been completed for 4 days    Is patient currently taking any steroid medications? (i.e. Prednisone, Medrol)  No     For patients on steroid medications, course must have been completed for 4 days    Is patient actively being treated for cancer or immunocompromised? No  If YES, do NOT schedule and route to RN pool     Are you able to get on and off an exam table with minimal or no assistance? Yes  If NO, do NOT schedule and route to RN pool    Are you able to roll over and lay on your stomach with minimal or no assistance? Yes  If NO, do NOT schedule and route to RN pool     Any allergies to contrast dye, iodine, shellfish, or numbing and steroid medications? No  If YES, route to RN pool AND add allergy information to appointment notes    Allergies: Imidazole antifungals, Ketoprofen, Lisinopril, Metformin, Metronidazole, and Posaconazole      Has the patient had a flu shot or any other vaccinations within 7 days before or after the procedure.   No     Have you recently had a COVID vaccine or have plans to get it in the near future? Yes - 3/16 and 4/14    If yes, explain that for the vaccine to work best they need to:       wait 1 week before and 1 week after getting Vaccine #1    wait 1 week before and 2 weeks after getting Vaccine #2    If patient has concerns about the timing, send to RN pool     Does patient have an MRI/CT?  Not Applicable  Check Procedure Scheduling Grid to see if required.      Was the MRI done within the last 3 years?  NA    If yes, where was the MRI done i.e.[facility], [facility], [facility], [facility] etc?       If no, do not schedule and route to RN pool    If MRI was not done at [facility], [facility] or [facility] do NOT schedule and route to RN pool.      If pt has an imaging disc, the injection MAY be scheduled but pt has to bring disc to appt.     If they show up without the disc the injection cannot be done    Procedure Specific Instructions:      If celiac plexus block, informed patient NPO for 6 hours and that it is okay to take medications with sips of water, especially blood pressure medications  Not Applicable         If this is for a cervical procedure, informed patient that aspirin needs to be held for 6 days.   Not Applicable      If IV needed:    Do not schedule procedures requiring IV placement in the first appointment of the day or first appointment after lunch. Do NOT schedule at 0745, 0815 or 1245.     Instructed pt to arrive 30 minutes early for IV start if required. (Check Procedure Scheduling Grid)  Not Applicable    Reminders:      If you are started on any steroids or antibiotics between now and your appointment, you must contact us because the procedure may need to be cancelled.  Yes      For all procedures except radiofrequency ablations (RFAs) and spinal cord stimulator (SCS) trials, informed patient:    IV sedation is not provided for this procedure.  If you feel that an oral anti-anxiety medication  is needed, you can discuss this further with your referring provider or primary care provider.  The Pain Clinic provider will discuss specifics of what the procedure includes at your appointment.  Most procedures last 10-20 minutes.  We use numbing medications to help with any discomfort during the procedure.  Not Applicable      For patients 85 or older we recommend having an adult stay w/ them for the remainder of the day.       Does the patient have any questions?  NO  Crissy Beard  [facility]

## 2021-03-30 NOTE — PATIENT INSTRUCTIONS
1. Will increase Topiramate   Week 1: 100mg (2 of the 50mg tabs)  in the am and 150mg (3 of the 50mg tabs) in the pm  Week 2: 150mg (3 of the 50mg tabs) twice daily    2. Schedule right SI joint and trochanteric bursa injection     3. Follow up in 6 weeks.    ----------------------------------------------------------------  Clinic Number:  [phone]     Call with any questions about your care and for scheduling assistance.     Calls are returned Monday through Friday between 8 AM and 4:30 PM. We usually get back to you within 2 business days depending on the issue/request.    If we are prescribing your medications:    For opioid medication refills, call the clinic or send a [employer] message 7 days in advance.  Please include:    Name of requested medication    Name of the pharmacy.    For non-opioid medications, call your pharmacy directly to request a refill. Please allow 3-4 days to be processed.     Per [place] State Law:    All controlled substance prescriptions must be filled within 30 days of being written.      For those controlled substances allowing refills, pickup must occur within 30 days of last fill.      We believe regular attendance is key to your success in our program!      Any time you are unable to keep your appointment we ask that you call us at least 24 hours in advance to cancel.This will allow us to offer the appointment time to another patient.     Multiple missed appointments may lead to dismissal from the clinic.

## 2021-03-31 ENCOUNTER — VIRTUAL VISIT (OUTPATIENT)
Dept: PALLIATIVE MEDICINE | Facility: CLINIC | Age: 61
End: 2021-03-31
Payer: COMMERCIAL

## 2021-03-31 DIAGNOSIS — M54.50 CHRONIC RIGHT-SIDED LOW BACK PAIN WITHOUT SCIATICA: Primary | ICD-10-CM

## 2021-03-31 DIAGNOSIS — G89.29 CHRONIC RIGHT-SIDED LOW BACK PAIN WITHOUT SCIATICA: Primary | ICD-10-CM

## 2021-03-31 DIAGNOSIS — M79.18 MYOFASCIAL PAIN: ICD-10-CM

## 2021-03-31 PROCEDURE — 96158 HLTH BHV IVNTJ INDIV 1ST 30: CPT | Mod: TEL | Performed by: PSYCHOLOGIST

## 2021-03-31 NOTE — PROGRESS NOTES
"Nena Tang is a 61 year old female who is being evaluated via a billable telephone visit.      The patient has been notified of following:     \"This telephone visit will be conducted via a call between you and Sonya Kohler PsyD LP. We have found that certain health care needs can be provided without the need for an in-person session.  This service lets us provide the care you need with a phone conversation.       If during the course of the call I feel a telephone visit is not appropriate, you will not be charged for this service.\"      Reviewed that patient is in a quiet private place, no recording without permission, all apps and notifications of any devices are turned off. Began the session by talking about the risks and benefits of telehealth, what to do if there is a break in the connection, reviewed the safety protocol for during and after sessions. The purpose of using telehealth for this session was due to the COVID-19 pandemic and was to reduce the spread of the disease and protect both the clinician and client.             Patient has given verbal consent for telephone visit? YES    Phone call contact time  Call Started at 10:00 AM  Call Ended at 10:23 AM  Total 23 minutes     Pain Diagnoses per pain provider:   Chronic right-sided low back pain without sciatica     Myofascial pain        DATA: During today's visit you reported the following: Your pain is unchanged. Your mood is 'alright - up and down' - struggled to identify specifics. Your activity level is about the same - working on going to drop in center 3x weekly. Your stress level is manageable. Your sleep is 'pretty good' - 7-8 hours per night. You reported engaging in self-care for your pain 1-2 times daily.    You identified that you would like to focus on the following or had questions regarding the following issues or concerns, and we discussed the following:   - does not recall pain psychology intake  - trying to engage in physical " activity at drop in center; trying to do as much as you're able and at your own pace  - actively engaged in drop-in center from 9-2 M T Th - encouraged to keep engaging in this activity  - primarily using distraction or isolation to manage mood   - you report you forgot about appointment, have friend picking you up at 10:20 AM which leaves us with a very short visit - discussed writing down appointments in the future so as to not have conflicts like this again  - using walker to walk outside, using seat as needed to rest; walking about a block couple times per week  - self-soothing with the 5 senses: struggled to identify    ASSESSMENT: Patient did not recall meeting with me previously, which may negatively impact benefit from pain psychology. Responsive to request to write down next appointment and avoid double booking. Does seem to be working to stay engaged in community supports through drop-in center and increasing her physical movement.    PLAN:   Your next appointment is scheduled for 5/7 at 10:00 AM.  Assignment/Objectives /interventions for next session:   - continue to work on walking outside as able  - continue to engage in drop-in center activities    Telephone-Visit Details    Type of service:  Telephone Visit    Originating Location (pt. Location): Home    Distant Location (provider location):  [facility]     Mode of Communication:  Telephone    I have reviewed the note as documented above.  This accurately captures the substance of my conversation with the patient.    Sonya Kohler PsyD LP  Licensed Psychologist  Outpatient Clinic Therapist  [facility]    Disclaimer: This note consists of symbols derived from keyboarding, dictation and/or voice recognition software. As a result, there may be errors in the script that have gone undetected. Please consider this when interpreting information found in this chart.

## 2021-04-01 ENCOUNTER — MEDICAL CORRESPONDENCE (OUTPATIENT)
Dept: HEALTH INFORMATION MANAGEMENT | Facility: CLINIC | Age: 61
End: 2021-04-01

## 2021-04-05 ENCOUNTER — TELEPHONE (OUTPATIENT)
Dept: FAMILY MEDICINE | Facility: CLINIC | Age: 61
End: 2021-04-05

## 2021-04-05 ENCOUNTER — RADIOLOGY INJECTION OFFICE VISIT (OUTPATIENT)
Dept: PALLIATIVE MEDICINE | Facility: CLINIC | Age: 61
End: 2021-04-05
Payer: COMMERCIAL

## 2021-04-05 VITALS
SYSTOLIC BLOOD PRESSURE: 148 MMHG | DIASTOLIC BLOOD PRESSURE: 78 MMHG | RESPIRATION RATE: 16 BRPM | OXYGEN SATURATION: 99 % | HEART RATE: 92 BPM

## 2021-04-05 DIAGNOSIS — M70.61 TROCHANTERIC BURSITIS OF RIGHT HIP: ICD-10-CM

## 2021-04-05 DIAGNOSIS — M53.3 SI (SACROILIAC) JOINT DYSFUNCTION: ICD-10-CM

## 2021-04-05 PROCEDURE — 20610 DRAIN/INJ JOINT/BURSA W/O US: CPT | Mod: RT | Performed by: PSYCHIATRY & NEUROLOGY

## 2021-04-05 PROCEDURE — 27096 INJECT SACROILIAC JOINT: CPT | Mod: RT | Performed by: PSYCHIATRY & NEUROLOGY

## 2021-04-05 ASSESSMENT — PAIN SCALES - GENERAL: PAINLEVEL: SEVERE PAIN (7)

## 2021-04-05 NOTE — TELEPHONE ENCOUNTER
Forms completed and faxed back to [facility] @ [phone]. Placed in abstraction to be scanned into patient's chart.    Richie Cisneros, Patient Representative

## 2021-04-05 NOTE — NURSING NOTE
Pre-procedure Intake    Have you been fasting? NA    If yes, for how long? na    Are you taking a prescribed blood thinner such as coumadin, Plavix, Xarelto?    No    If yes, when did you take your last dose? na    Do you take aspirin?  Yes -   ASA    If cervical procedure, have you held aspirin for 6 days?   NA    Do you have any allergies to contrast dye, iodine, steroid and/or numbing medications?  NO    Are you currently taking antibiotics or have an active infection?  NO    Have you had a fever/elevated temperature within the past week? NO    Are you currently taking oral steroids? NO    Do you have a ? Yes       Are you pregnant or breastfeeding?  NO    Have you received the COVID-19 vaccine? Yes       If yes, was it your 1st, 2nd or only dose needed? 1st    Date of most recent vaccine: 3 weeks ago    Are the vital signs normal?  Yes      Allyn Jenkins CMA ([facility])

## 2021-04-05 NOTE — PATIENT INSTRUCTIONS
[facility]   Procedure Discharge Instructions    Today you saw:  Dr. Ruba Paz      You had an:   Sacroiliac Joint Injection  and Trochanteric Bursa Injection     Medications used:  Lidocaine   Omnipaque  Ropivicaine   Kenalog               If you were holding your blood thinning medication, please restart taking it: N/A    Be cautious when walking. Numbness and/or weakness in the lower extremities may occur for up to 6-8 hours after the procedure due to effect of the local anesthetic    Do not drive for 6 hours. The effect of the local anesthetic could slow your reflexes.     You may resume your regular activities after 24 hours    Avoid strenuous activity for the first 24 hours    You may shower, however avoid swimming, tub baths or hot tubs for 24 hours following your procedure    You may have a mild to moderate increase in pain for several days following the injection.    It may take up to 14 days for the steroid medication to start working although you may feel the effect as early as a few days after the procedure.       You may use ice packs for 10-15 minutes, 3 to 4 times a day at the injection site for comfort    Do not use heat to painful areas for 6 to 8 hours. This will give the local anesthetic time to wear off and prevent you from accidentally burning your skin.     Unless you have been directed to avoid the use of anti-inflammatory medications (NSAIDS), you may use medications such as ibuprofen, Aleve or Tylenol for pain control if needed.     If you have diabetes, check your blood sugar more frequently than usual as your blood sugar may be higher than normal for 10-14 days following a steroid injection. Contact your doctor who manages your diabetes if your blood sugar is higher than usual    Possible side effects of steroids that you may experience include flushing, elevated blood pressure, increased appetite, mild headaches and restlessness.  All of these symptoms  will get better with time.    If you experience any of the following, call the Pain Clinic during work hours (Mon-Friday 8-4:30 pm) at [phone] or the Provider Line after hours at [phone]:  -Fever over 100 degree F  -Swelling, bleeding, redness, drainage, warmth at the injection site  -Progressive weakness or numbness in your legs   -Unusual new onset of pain that is not improving

## 2021-04-05 NOTE — NURSING NOTE
Discharge Information    IV Discontiued Time:  NA    Amount of Fluid Infused:  NA    Discharge Criteria = When patient returns to baseline or as per MD order    Consciousness:  Pt is fully awake    Circulation:  BP +/- 20% of pre-procedure level    Respiration:  Patient is able to breathe deeply    O2 Sat:  Patient is able to maintain O2 Sat >92% on room air    Activity:  Moves 4 extremities on command    Ambulation:  Patient is able to stand and walk or stand and pivot into wheelchair    Dressing:  Clean/dry or No Dressing    Notes:   Discharge instructions and AVS given to patient    Patient meets criteria for discharge?  YES    Admitted to PCU?  No    Responsible adult present to accompany patient home?  Yes    Signature/Title:    Indra Le RN  RN Care Coordinator  [facility] [place]

## 2021-04-05 NOTE — PROGRESS NOTES
Pre procedure Diagnosis: SI joint dysfunction, right trochanteric bursitis  Post procedure Diagnosis: Same  Procedure performed: right SI joint injection and right trochanteric bursa injection (no flouro)  Anesthesia: none  Complications: none  Operators: Shell Paz MD     Indications:   Nena Tang is a 61 year old female seen by me in clinic for back and hip pain.  They have a history of right low back, buttock and hip pain.  Exam shows right SI joint tenderness, right trochanteric bursa and they have tried conservative treatment including medications, PT.    Options/alternatives, benefits and risks were discussed with the patient including bleeding, infection, tissue trauma, exposure to radiation, reaction to medications including seizure, nerve injury, weakness, and numbness.  Questions were answered to her satisfaction and she agrees to proceed. Voluntary informed consent was obtained and signed.     Vitals were reviewed: Yes  Allergies were reviewed:  Yes   Medications were reviewed:  Yes   Pre-procedure pain score: 7/10    Procedure:  After getting informed consent, patient was brought into the procedure suite and was placed in a prone position on the procedure table.   A Pause for the Cause was performed.  Patient was prepped and draped in sterile fashion.     After identifying the right SI joint, the C-arm was rotated to a obliquely to obtain the best view of the inferior angle of the joint.  A total of 3 ml of Lidocaine 1%  was used to anesthetize the skin at a skin entry site coaxial with the fluoroscopy beam at this location.  A 22gauge 3.5 inch needle was advanced under intermittent fluoroscopy until it was felt to enter the SI joint.    A total of 1ml of Omnipaque-300 was injected, confirming appropriate position, with spread into the intraarticular space, with no intravascular uptake noted.  9ml was wasted. Location was verified in lateral view.    1.5 ml of 0.2% ropivacaine with 40mg of  kenalog was injected.  The needle was flushed with lidocaine and removed.    The area over the right trochanter was palpated, and the tender area was identified, corresponding to the area of the trochanteric bursa.  The area was cleaned with alcohol pad.  A 25 G 3.5 inch needle was inserted, aiming towards the trochanter.  When bone was encountered, the needle was slightly drawn.  A solution containing local anesthesic and steroid was injected.  The needle was removed.  Hemostasis was achieved. Bandaids were placed as appropriate.      In total, 2.5ml of 0.5% ropivacaine and 20mg of kenalog was injected.       Hemostasis was achieved, the area was cleaned, and bandaids were placed when appropriate.  The patient tolerated the procedure well, and was taken to the recovery room.    Images were saved to PACS.    Post-procedure pain score:  5/10  Follow-up includes:   -f/u with referring provider    Shell Paz MD  [facility]

## 2021-04-06 ENCOUNTER — OFFICE VISIT (OUTPATIENT)
Dept: FAMILY MEDICINE | Facility: CLINIC | Age: 61
End: 2021-04-06
Payer: COMMERCIAL

## 2021-04-06 ENCOUNTER — OFFICE VISIT (OUTPATIENT)
Dept: PHARMACY | Facility: CLINIC | Age: 61
End: 2021-04-06
Payer: COMMERCIAL

## 2021-04-06 ENCOUNTER — OFFICE VISIT (OUTPATIENT)
Dept: BEHAVIORAL HEALTH | Facility: CLINIC | Age: 61
End: 2021-04-06
Payer: COMMERCIAL

## 2021-04-06 VITALS
OXYGEN SATURATION: 94 % | SYSTOLIC BLOOD PRESSURE: 130 MMHG | HEART RATE: 86 BPM | TEMPERATURE: 97.3 F | WEIGHT: 220 LBS | BODY MASS INDEX: 42.97 KG/M2 | DIASTOLIC BLOOD PRESSURE: 66 MMHG

## 2021-04-06 DIAGNOSIS — F25.1 SCHIZOAFFECTIVE DISORDER, DEPRESSIVE TYPE (H): Primary | ICD-10-CM

## 2021-04-06 DIAGNOSIS — F25.0 SCHIZOAFFECTIVE DISORDER, BIPOLAR TYPE (H): ICD-10-CM

## 2021-04-06 DIAGNOSIS — E11.65 TYPE 2 DIABETES MELLITUS WITH HYPERGLYCEMIA, WITH LONG-TERM CURRENT USE OF INSULIN (H): ICD-10-CM

## 2021-04-06 DIAGNOSIS — R07.89 RIGHT-SIDED CHEST WALL PAIN: ICD-10-CM

## 2021-04-06 DIAGNOSIS — E11.3299 TYPE 2 DIABETES MELLITUS WITH MILD NONPROLIFERATIVE RETINOPATHY WITHOUT MACULAR EDEMA, WITH LONG-TERM CURRENT USE OF INSULIN, UNSPECIFIED LATERALITY (H): Primary | ICD-10-CM

## 2021-04-06 DIAGNOSIS — F25.1 SCHIZOAFFECTIVE DISORDER, DEPRESSIVE TYPE (H): ICD-10-CM

## 2021-04-06 DIAGNOSIS — Z79.4 TYPE 2 DIABETES MELLITUS WITH HYPERGLYCEMIA, WITH LONG-TERM CURRENT USE OF INSULIN (H): ICD-10-CM

## 2021-04-06 DIAGNOSIS — Z79.4 TYPE 2 DIABETES MELLITUS WITH MILD NONPROLIFERATIVE RETINOPATHY WITHOUT MACULAR EDEMA, WITH LONG-TERM CURRENT USE OF INSULIN, UNSPECIFIED LATERALITY (H): Primary | ICD-10-CM

## 2021-04-06 DIAGNOSIS — G47.33 OSA (OBSTRUCTIVE SLEEP APNEA): Primary | ICD-10-CM

## 2021-04-06 DIAGNOSIS — R05.9 COUGH: ICD-10-CM

## 2021-04-06 PROCEDURE — 99607 MTMS BY PHARM ADDL 15 MIN: CPT | Performed by: PHARMACIST

## 2021-04-06 PROCEDURE — 99606 MTMS BY PHARM EST 15 MIN: CPT | Performed by: PHARMACIST

## 2021-04-06 PROCEDURE — 90832 PSYTX W PT 30 MINUTES: CPT | Performed by: SOCIAL WORKER

## 2021-04-06 PROCEDURE — 99215 OFFICE O/P EST HI 40 MIN: CPT | Performed by: NURSE PRACTITIONER

## 2021-04-06 RX ORDER — INSULIN ASPART 100 [IU]/ML
28 INJECTION, SOLUTION INTRAVENOUS; SUBCUTANEOUS 2 TIMES DAILY WITH MEALS
Start: 2021-04-06 | End: 2021-05-18

## 2021-04-06 RX ORDER — CLONAZEPAM 0.5 MG/1
0.5 TABLET ORAL AT BEDTIME
Qty: 30 TABLET | Refills: 0 | Status: SHIPPED | OUTPATIENT
Start: 2021-04-06 | End: 2021-07-06

## 2021-04-06 RX ORDER — BENZONATATE 100 MG/1
100 CAPSULE ORAL 3 TIMES DAILY PRN
Qty: 90 CAPSULE | Refills: 1 | Status: SHIPPED | OUTPATIENT
Start: 2021-04-06 | End: 2021-07-19

## 2021-04-06 NOTE — PROGRESS NOTES
Medication Therapy Management (MTM) Encounter    ASSESSMENT:                            Medication Adherence/Access: See below for considerations    Type 2 Diabetes: A1c not at goal <7%. Education on use of short acting insulin with meals. Encouraged more frequent scanning with Freestyle Gabby to capture glucose readings. Encouraged reducing sugar/carbohydrate.    Schizoaffective: depression stable, called home care to verify if patient is taking clonazepam and appears she has not had this set up. Waiting for call back from Blanka JIN to verify (Lanny paris RN last day yesterday).      PLAN:                            1. Take Novolog with meals  2. Scan Gabby 3 or more times daily  3. Called home care RN x2 and LVM to restart clonazepam scheduled 0.5mg HS as prescribed. Awaiting call back to confirm. *Received call back, patient HAS been taking clonazepam but had old bottles she was using up. Now needing a refill.     Follow-up: 1 month    SUBJECTIVE/OBJECTIVE:                          Nena Tang is a 61 year old female coming in for a follow-up visit. She was referred to me from Rowena Haas. Today's visit is a co-visit with PCP and Richardson Lopez [facility]. Today's visit is a follow-up MTM visit from 3/2/21.     Reason for visit: recheck.    Tobacco: She reports that she has never smoked. She has never used smokeless tobacco.  Alcohol: not currently using    Medication Adherence/Access: Taking pills, no missed doses. Using alarms on med machine. RN visits weekly.   [phone] home care  Blanka grimaldo RN case manager [phone]     Type 2 Diabetes: Pt currently prescribed Lantus 60u daily in AM, Novolog 28u BID (taking in AM and PM, not with food), Trulicity 1.5mg weekly. Pt is not experiencing side effects.    SMBG: CGM. Gap with replacement of sensor, RN helping now.      Hypoglycemia: 1-2 times in the past month, occurs in the AM.  Recent symptoms of high blood sugar? Fatigue. Steroid injection  yesterday for hip and back pain.  Eye exam: due  Foot exam: up to date  ACEi/ARB: No.   Urine Albumin:   Lab Results   Component Value Date    MICROL 7 09/15/2020   Aspirin: Taking 81mg daily and denies side effects  Diet/Exercise: eating twice a day. Usually eats snack and lunch at drop-in center (snack orange and tea with honey, lunch salad or fast food)  Lab Results   Component Value Date    A1C 9.1 12/01/2020    A1C 9.7 09/15/2020    A1C 8.6 06/30/2020    A1C 6.2 12/03/2019    A1C 6.6 08/06/2019       Schizoaffective: Currently taking escitalopram 10mg daily (new, replaced sertraline), Invega 9mg daily, amantadine 100mg AM and 200mg PM, trazodone 100mg HS, clonazepam 0.5mg HS (?unsure if taking, refill history suggests none since December). She is not using CPAP.  Mood has been table. Attending drop-in center. Exercise and engaged with PT.  Engaged with self-care, taking medication regularly, checking glucose and using insulin, attending pain specialty appointments.   Sleep: more nightmares.   Continues to live with her sister who is supportive.   See [facility] notes for additional details.     Today's Vitals: LMP 01/06/2015 (Exact Date)    BP Readings from Last 1 Encounters:   04/06/21 130/66     Pulse Readings from Last 1 Encounters:   04/06/21 86     Wt Readings from Last 1 Encounters:   04/06/21 220 lb (99.8 kg)     Ht Readings from Last 1 Encounters:   02/02/21 5' (1.524 m)     Estimated body mass index is 42.97 kg/m  as calculated from the following:    Height as of 2/2/21: 5' (1.524 m).    Weight as of an earlier encounter on 4/6/21: 220 lb (99.8 kg).    Temp Readings from Last 1 Encounters:   04/06/21 97.3  F (36.3  C)      ----------------      I spent 40 minutes with this patient today. All changes were made via collaborative practice agreement with Rowena Haas A copy of the visit note was provided to the patient's primary care provider.    The patient was given a summary of these recommendations. See  Provider note/AVS from today.     Eugenia De Jesus, PharmD, BCACP

## 2021-04-06 NOTE — PROGRESS NOTES
SUBJECTIVE:                                                    Nena Tang is a 61 year old female who presents to clinic today for the following health issues:  [facility]: [place]   MTM: Eugenia/Connor (student)    HPI  Cough: for about a month; mostly at night. States that she is not doing anything anything for it. With some right side chest pain. Denies any shortness of breath. Discussed a trial of tessalon pearls and also diclofenac gel to the chest muscles.     Medication Compliance: Reports 100 % compliance since the last visit. Has a back up plan to get her scheduled medications. Currently using a med machine and feels like this has been the most helpful for her.     Diabetes Follow-up  Checking her blood sugars morning and night time. Low BG in the morning. Novolog 28 units in the morning and Lantus 60 units daily and also Novolog in the evening. Eating mostly twice per day.   How often are you checking your blood sugar? Two times daily  Blood sugar testing frequency justification:  Uncontrolled diabetes  What time of day are you checking your blood sugars (select all that apply)?  Before meals  Have you had any blood sugars above 200?  Yes   Have you had any blood sugars below 70?  No        BP Readings from Last 2 Encounters:   04/06/21 130/66   04/05/21 (!) 148/78     Hemoglobin A1C (%)   Date Value   12/01/2020 9.1 (H)   09/15/2020 9.7 (H)     LDL Cholesterol Calculated (mg/dL)   Date Value   12/01/2020 141 (H)   12/22/2019 86               Hypertension Follow-up  Patient denies any chest pain, shortness of breath, heart palpitations or any other symptoms.     Do you check your blood pressure regularly outside of the clinic? Yes: per nursing staff weekly.     Are you following a low salt diet? No    Are your blood pressures ever more than 140 on the top number (systolic) OR more   than 90 on the bottom number (diastolic), for example 140/90? No      Chronic Pain: Patient reports that she had injections yesterday  and will continue working with physical therapy- will schedule appointments for this.     Mental Health Follow up   [facility]1 [facility]. Patient is looking to moving to her own place. PCA 6 hours daily.   No concerns with medication changes per Dr. Brothers (psychiatry). Patient has not been getting her clonazepam since last prescription in December, concerned if this is the reason for her hallucinations or her nightmares? Will send a prescription and follow-up in 4 weeks.     Status since last visit: stable    See PHQ-9 for current symptoms.  Other associated symptoms: None    Complicating factors: none.   Significant life event:  No   Current substance abuse:  None  Anxiety or Panic symptoms:  No    Sleep - ok, bad nightmares. Plan to restart clonazepam and reevaluate in 4 weeks.   Appetite - good.   Exercise - works out at the [employer] [place], also working     Caffeine: Tea    PHQ-9  English PHQ-9   Any Language             Problems taking medications regularly: No    Medication side effects: none    Diet: regular (no restrictions)    Social History     Tobacco Use     Smoking status: Never Smoker     Smokeless tobacco: Never Used   Substance Use Topics     Alcohol use: No     Frequency: Never     Comment: last month        Problem list and histories reviewed & adjusted, as indicated.  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 (H)     Illiterate     Irritable bowel syndrome     overweight - BMI >35     Takotsubo cardiomyopathy     CAD (coronary artery disease)     Restless legs syndrome (RLS)     CINDY (obstructive sleep apnea)- mild AHI 10.3     Verbal auditory hallucination     Chronic low back pain     Schizoaffective disorder, depressive type (H)      Migraine headache     HTN, goal below 140/90     Health Care Home     Lumbago     Cervicalgia     Cocaine abuse, episodic (H)     Suicidal ideation     Esophageal reflux     Mild nonproliferative diabetic retinopathy (H)     Tardive dyskinesia     Alcohol use     Left cataract     Falls frequently     History of uterine cancer     Psychophysiological insomnia     Dysuria     Asymptomatic postmenopausal status     Abdominal pain, right lower quadrant     Infectious encephalopathy     Non-intractable vomiting with nausea     Thoughts of self harm     Gastroenteritis     Posttraumatic stress disorder     Cervical cancer screening     Type 2 diabetes mellitus with stage 3 chronic kidney disease, with long-term current use of insulin (H)     Positive AIDA (antinuclear antibody)     Hyperglycemia     Pneumonia     Depression with suicidal ideation     Mixed incontinence     Chronic pain syndrome     Fibromyalgia     Past Surgical History:   Procedure Laterality Date     C OOPHORECTORMY FOR RAHEL, W/BX  1983    UTERINE     CATARACT IOL, RT/LT Bilateral 2017     COLONOSCOPY N/A 3/16/2017    Procedure: COLONOSCOPY;  Surgeon: Traci Gonzalez MD;  Location:  GI     Coronary CTA  5/21/2014     HYSTERECTOMY  1983    uterine cancer yearly pap's per provider.     LAPAROSCOPIC CHOLECYSTECTOMY  2008     PHACOEMULSIFICATION CLEAR CORNEA WITH STANDARD INTRAOCULAR LENS IMPLANT Left 5/5/2017    Procedure: PHACOEMULSIFICATION CLEAR CORNEA WITH STANDARD INTRAOCULAR LENS IMPLANT;  LEFT EYE PHACOEMULSIFICATION CLEAR CORNEA WITH STANDARD INTRAOCULAR LENS IMPLANT ;  Surgeon: Tyra Diaz MD;  Location: [facility]     PHACOEMULSIFICATION CLEAR CORNEA WITH STANDARD INTRAOCULAR LENS IMPLANT Right 6/30/2017    Procedure: PHACOEMULSIFICATION CLEAR CORNEA WITH STANDARD INTRAOCULAR LENS IMPLANT;  RIGHT EYE PHACOEMULSIFICATION CLEAR CORNEA WITH STANDARD INTRAOCULAR LENS IMPLANT;  Surgeon: Tyra Diaz MD;  Location: [facility]      RELEASE TRIGGER FINGER  10/11/2012    Left thumb. Procedure: RELEASE TRIGGER FINGER;  LEFT THUMB TRIGGER RELEASE;  Surgeon: Tay Langley MD;  Location:  [facility]     RELEASE TRIGGER FINGER Right 2016    Procedure: RELEASE TRIGGER FINGER;  Surgeon: Albino Castañeda MD;  Location:  OR       Social History     Tobacco Use     Smoking status: Never Smoker     Smokeless tobacco: Never Used   Substance Use Topics     Alcohol use: No     Frequency: Never     Comment: last month     Family History   Problem Relation Age of Onset     Cancer Mother         BLADDER     Respiratory Mother         COPD     Gastrointestinal Disease Mother         CIRRHOSIS OF LI BOLIVAR     Alcohol/Drug Mother      Diabetes Mother      Hypertension Mother      Lipids Mother      C.A.D. Mother      Glaucoma Mother      Alcohol/Drug Sister      Mental Illness Sister      Alcohol/Drug Sister      Psychotic Disorder Sister      Cancer Maternal Grandmother         UNKNOWN TYPE     Cancer Brother         COLON     Cancer - colorectal Brother         IN HIS LATE 30S     Alcohol/Drug Brother          OF HEROIN OVERDOSE AT AGE 22 YRS     Macular Degeneration No family hx of            Current Outpatient Medications   Medication Sig Dispense Refill     acetaminophen (TYLENOL) 650 MG CR tablet Take 1 tablet (650 mg) by mouth every 8 hours as needed for mild pain or fever (Patient not taking: Reported on 2021) 120 tablet 1     alcohol swab prep pads Use to swab area of injection/rodolfo as directed. 100 each 3     amantadine (SYMMETREL) 100 MG capsule Take 1 capsule (100 mg)  in the morning and 2 capsules (200 mg) in the evening. 60 capsule 3     aspirin (ASA) 81 MG EC tablet TAKE 1 TABLET (81MG) BY MOUTH DAILY 90 tablet 3     atorvastatin (LIPITOR) 80 MG tablet TAKE 1 TABLET (80MG) BY MOUTH DAILY 30 tablet 11     blood glucose (NO BRAND SPECIFIED) test strip Use to test blood sugar 4 times daily or as directed. 100 strip 11     clonazePAM  (KLONOPIN) 0.5 MG tablet Take 1 tablet (0.5 mg) by mouth At Bedtime 30 tablet 3     Continuous Blood Gluc Sensor (FREESTYLE LEBRON 14 DAY SENSOR) MIS 1 Device every 14 days Change sensor as directed every 14 days 2 each 11     diclofenac (VOLTAREN) 1 % topical gel Place 4 g onto the skin 4 times daily as needed for moderate pain       escitalopram (LEXAPRO) 10 MG tablet Take 1 tablet (10 mg) by mouth daily 30 tablet 3     famotidine (PEPCID) 20 MG tablet Take 1 tablet (20 mg) by mouth daily 90 tablet 0     fluticasone-salmeterol (ADVAIR) 250-50 MCG/DOSE inhaler Inhale 1 puff into the lungs every 12 hours as needed       gabapentin (NEURONTIN) 300 MG capsule TAKE 1 CAPSULE (300MG) BY MOUTH AT BEDTIME 30 capsule 3     hydrOXYzine (VISTARIL) 25 MG capsule Take 1 capsule (25 mg) by mouth every 4 hours as needed for anxiety 60 capsule 3     insulin glargine (LANTUS PEN) 100 UNIT/ML pen Inject 60 Units Subcutaneous every morning       insulin pen needle (NOVOFINE 30) 30G X 8 MM miscellaneous USE 4 DAILY OR AS DIRECTED 400 each 1     ipratropium - albuterol 0.5 mg/2.5 mg/3 mL (DUONEB) 0.5-2.5 (3) MG/3ML neb solution Take 1 vial (3 mLs) by nebulization every 6 hours as needed for shortness of breath / dyspnea or wheezing 30 vial 0     losartan (COZAAR) 100 MG tablet TAKE 1 TABLET (100MG) BY MOUTH DAILY 90 tablet 0     metoprolol succinate ER (TOPROL-XL) 25 MG 24 hr tablet Take 2 tablets (50 mg) by mouth daily 180 tablet 3     miconazole (MICATIN/MICRO GUARD) 2 % external powder Apply topically once as needed (Patient not taking: Reported on 4/5/2021)       NOVOLOG FLEXPEN 100 UNIT/ML soln INJECT 24 UNITS SUBCUTANEOUSLY THREE TIMES A DAY (WITH MEALS) AND AN EXTRA 10 UNITS WITH SUGARY 15 mL 3     nystatin (MYCOSTATIN) 585286 UNIT/GM external cream Apply topically 2 times daily (Patient not taking: Reported on 4/5/2021) 30 g 3     order for DME Equipment being ordered: Depends. 30 each 4     order for DME Equipment being  ordered: CPAP Supplies. 1 each 0     paliperidone ER (INVEGA) 9 MG 24 hr tablet TAKE 1 TABLET BY MOUTH AT BEDTIME 30 tablet 2     senna-docusate (SENOKOT-S/PERICOLACE) 8.6-50 MG tablet Take 1 tablet by mouth 2 times daily as needed for constipation 60 tablet 10     thin (NO BRAND SPECIFIED) lancets Use with lanceting device. To accompany: Blood Glucose Monitor Brands: per insurance. 100 each 6     topiramate (TOPAMAX) 50 MG tablet Take 100mg (2 tab) by mouth in the am and 150mg (3 tabs) in the pm x 1 week, then increase to 150mg (3 tabs) twice daily. 180 tablet 3     traZODone (DESYREL) 100 MG tablet Take 1 tablet (100 mg) by mouth nightly as needed for sleep 30 tablet 3     TRULICITY 1.5 MG/0.5ML pen Inject 1.5 mg Subcutaneous once a week Every Friday 4 mL 3     zinc oxide (DESITIN) 40 % external ointment Apply topically as needed for dry skin or irritation (Patient not taking: Reported on 4/5/2021) 56 g 0     Allergies   Allergen Reactions     Imidazole Antifungals Hives     Tolerates diflucan     Ketoprofen Itching     Pruritis to topical     Lisinopril Hives     Metformin Other (See Comments)     Patient hospitalized for lactic acidosis - admitting provider suspectd caused by metformin     Metronidazole Hives     Posaconazole Hives     Tolerates diflucan     Recent Labs   Lab Test 12/01/20  1730 10/04/20  1224 09/15/20  1454 06/30/20  1624 12/22/19  0651 12/22/19  0651 08/06/19  1043 08/06/19  1043 05/24/19  0916 05/24/19  0916   A1C 9.1*  --  9.7* 8.6*  --   --    < > 6.6*  --   --    *  --   --   --   --  86  --  81  --   --    HDL 46*  --   --   --   --  42*  --  39*  --   --    TRIG 141  --   --   --   --  132  --  131  --   --    ALT  --  29 29 35   < > 22   < >  --    < > 30   CR  --  0.86 0.85 0.98   < > 0.86   < >  --    < > 0.87   GFRESTIMATED  --  73 74 63   < > 74   < >  --    < > 73   GFRESTBLACK  --  85 86 73   < > 85   < >  --    < > 85   POTASSIUM  --  4.2 4.4 4.3   < > 4.1   < >  --    < >  4.0   TSH  --   --   --   --   --  1.23  --   --   --  1.62    < > = values in this interval not displayed.      BP Readings from Last 3 Encounters:   04/06/21 130/66   04/05/21 (!) 148/78   03/30/21 122/76    Wt Readings from Last 3 Encounters:   04/06/21 99.8 kg (220 lb)   03/16/21 98.9 kg (218 lb)   03/02/21 98.4 kg (217 lb)          Labs reviewed in EPIC  Problem list, Medication list, Allergies, and Medical/Social/Surgical histories reviewed in [facility] and updated as appropriate.      ROS: Constitutional, neuro, ENT, endocrine, pulmonary, cardiac, gastrointestinal, genitourinary, musculoskeletal, integument and psychiatric systems are negative, except as otherwise noted above in the HPI.     OBJECTIVE:                                                    /66   Pulse 86   Temp 97.3  F (36.3  C)   Wt 99.8 kg (220 lb)   LMP 01/06/2015 (Exact Date)   SpO2 94%   BMI 42.97 kg/m    Body mass index is 42.97 kg/m .    GENERAL: healthy, alert and no distress  EYES: Eyes grossly normal to inspection, PERRL and conjunctivae and sclerae normal  RESP: lungs clear to auscultation - no rales, rhonchi or wheezes  CV: regular rate and rhythm, normal S1 S2, no S3 or S4, no murmur, click or rub, no peripheral edema and peripheral pulses strong  ABDOMEN: soft, nontender and bowel sounds normal  MS: no gross musculoskeletal defects noted, no edema  NEURO: Normal strength and tone, mentation intact and speech normal    Mental Status Assessment:  Appearance:   Appropriate   Eye Contact:   Good   Psychomotor Behavior: Normal   Attitude:   Cooperative   Orientation:   All  Speech   Rate / Production: Normal    Volume:  Normal   Mood:    Normal  Affect:    Appropriate   Thought Content:  Clear   Thought Form:  Coherent  Logical   Insight:    Fair   Attention Span and Concentration: appropriate.   Recent and Remote Memory:  intact  Fund of Knowledge: appropriate  Muscle Strength and Tone: normal   Suicidal Ideation: reports no  thoughts, no intention  Hallucination: Yes  Paranoid-No    See [facility] notes     Diagnostic Test Results:  Labs reviewed in Epic  none      ASSESSMENT/PLAN:                                                    1. CINDY (obstructive sleep apnea)- mild AHI 10.3  Patient is looking to restart using her CPAP nightly bit would like to get replacements for her CPAP supplies. Will place the order and fax to the medical store.  - Nebulizer and Supplies Order for DME - ONLY FOR DME  - CPAP Order for DME - ONLY FOR DME    2. Type 2 diabetes mellitus with hyperglycemia, with long-term current use of insulin (H)  Uncontrolled.   Lab Results   Component Value Date    A1C 9.1 12/01/2020    A1C 9.7 09/15/2020    A1C 8.6 06/30/2020    A1C 6.2 12/03/2019    A1C 6.6 08/06/2019     - EYE ADULT REFERRAL; Future    3. Right-sided chest wall pain  4. Cough  Discussed using some medication to help with the symptoms. If no improvement, we can look at other options.   - diclofenac (VOLTAREN) 1 % topical gel; Apply 4 g topically 4 times daily  Dispense: 350 g; Refill: 1  - benzonatate (TESSALON) 100 MG capsule; Take 1 capsule (100 mg) by mouth 3 times daily as needed for cough  Dispense: 90 capsule; Refill: 1    5. Schizoaffective disorder, bipolar type (H)  Improving, has been struggling with nightmares. Concerned that this might be related to her being off her clonazepam for the past few months. Will send a prescription and follow-up as discussed.   - clonazePAM (KLONOPIN) 0.5 MG tablet; Take 1 tablet (0.5 mg) by mouth At Bedtime  Dispense: 30 tablet; Refill: 0      Patient Instructions   - Scan blood sugars at least 3 times per day.   - Novolog (Blue pen) with food only.   - Try ointment for the chest pain.   - Tessalon pearls for cough.   -Call clinic with any questions or concerns.       52 minutes spent on the date of the encounter doing chart review, history and exam, documentation and further activities as noted above.    Rowena Weber  ART Haas CNP  [facility]

## 2021-04-06 NOTE — PROGRESS NOTES
[facility] - Integrated Primary Care   April 6, 2021    Behavioral Health Clinician Progress Note    Patient Name: Nena Tang           Service Type:  Individual      Service Location:   Face to Face in Clinic     Session Start Time: 2:05pm  Session End Time:  2:33pm      Session Length: 16 - 37      Attendees: Patient    Visit Activities (Refresh list every visit): [facility] [employer]     Diagnostic Assessment Date: 1-23-18 Updated DA completed December 12, 2019  Treatment Plan Review Date: 5-2-2021  CGI date completed: 11-    See Flowsheets for today's PHQ-9 and TAMIA-7 results  Previous PHQ-9:   PHQ-9 SCORE 10/26/2018 5/7/2019 12/1/2020   PHQ-9 Total Score - - -   PHQ-9 Total Score - - -   PHQ-9 Total Score 20 22 13     Previous TAMIA-7:   TAMIA-7 SCORE 2/3/2017 3/13/2018 10/26/2018   Total Score - - -   Total Score 0 17 19   Total Score - - -       ALEXANDRA LEVEL:  ALEXANDRA Score (Last Two) 8/30/2011 6/9/2014   ALEXANDRA Raw Score 42 37   Activation Score 66 49.9   ALEXANDRA Level 3 2       DATA  Extended Session (60+ minutes): No  Interactive Complexity: No  Crisis: No  [facility] Patient: No    Treatment Objective(s) Addressed in This Session:  Target Behavior(s): disease management/lifestyle changes mental health    Depressed Mood: Increase interest, engagement, and pleasure in doing things  Decrease frequency and intensity of feeling down, depressed, hopeless  Improve quantity and quality of night time sleep / decrease daytime naps  Identify negative self-talk and behaviors: challenge core beliefs, myths, and actions  Improve concentration, focus, and mindfulness in daily activities   Feel less fidgety, restless or slow in daily activities / interpersonal interactions  Anxiety: will experience a reduction in anxiety and will develop more effective coping skills to manage anxiety symptoms  Psychological distress related to Diabetes  Psychological distress related to Chronic Disease Management    Current Stressors / Issues:    The  patient was seen by [facility] per the request of the PCP-she stated that she completed numerous activities to take care of her health and she was given positive feedback concerning this (she smiles) she stated that she is doing really good in life-she talked with the PCP about some symptoms she his having-she stated that she motivated the self to complete her activities to complete treatment recommendations-she stated that she is wanting to move to apartment where she would live alone-she then stated that she has been able to take all of her medications as prescribed and then she went over her medications with MTM and PCP to show that she knows what she is doing independently-she has the nurse that comes to her home and helps with medications and with setting up medical equipment-she agreed to when she will check her blood sugars-    Progress on Treatment Objective(s) / Homework:  Minimal progress - PREPARATION (Decided to change - considering how); Intervened by negotiating a change plan and determining options / strategies for behavior change, identifying triggers, exploring social supports, and working towards setting a date to begin behavior change    Motivational Interviewing    MI Intervention: Expressed Empathy/Understanding, Supported Autonomy, Collaboration, Evocation, Permission to raise concern or advise, Open-ended questions, Reflections: simple and complex, Rolled with resistance: Emphasized patient autonomy, Simple reflection and Evoked patient agenda and Change talk (evoked)     Change Talk Expressed by the Patient: Desire to change Ability to change Reasons to change Need to change Committment to change Activation Taking steps    Provider Response to Change Talk: E - Evoked more info from patient about behavior change, A - Affirmed patient's thoughts, decisions, or attempts at behavior change, R - Reflected patient's change talk and S - Summarized patient's change talk statements      Care Plan review  completed: No    Medication Review:  No changes to current psychiatric medication(s)     Medication Compliance:  Yes    Changes in Health Issues:   Yes: please see medical note by PCP     Chemical Use Review:   Substance Use: Chemical use reviewed, no active concerns identified      Tobacco Use: No current tobacco use.      Assessment: Current Emotional / Mental Status (status of significant symptoms):  Risk status (Self / Other harm or suicidal ideation)  Patient has had a history of suicidal ideation: yes  Patient denies current fears or concerns for personal safety.  Patient denies current or recent suicidal ideation or behaviors.  Patient denies current or recent homicidal ideation or behaviors.  Patient denies current or recent self injurious behavior or ideation.  Patient denies other safety concerns.  A safety and risk management plan has not been developed at this time, however patient was encouraged to call [place] [phone] should there be a change in any of these risk factors.    Appearance:   Appropriate   Eye Contact:   Good   Psychomotor Behavior: Normal   Attitude:   Cooperative   Orientation:   All  Speech   Rate / Production: Normal    Volume:  Normal   Mood:    Anxious  Normal  Affect:    Appropriate   Thought Content:  Clear   Thought Form:  Coherent  Goal Directed  Logical   Insight:    Fair     Diagnoses:  1. Schizoaffective disorder, depressive type (H)        Collateral Reports Completed:  Not Applicable    Plan: (Homework, other):  Patient was given information about behavioral services and encouraged to schedule a follow up appointment with the clinic [facility] as needed to work on helping the patient with management of mood states and to work on stress management around her grieving process-also to help the patient with her behavioral needs to comply with treatment medical recommendations.  She was also given information about mental health symptoms and treatment options .  CD Recommendations:  Maintain Sobriety.    Richardson Lopez, [facility], [facility]                                                    Treatment Plan    Client's Name: Nena Tang  YOB: 1960    Date: Reviewed/updated on 2/2/2021     DSM5 Diagnoses: (Sustained by DSM5 Criteria Listed Above)  Diagnoses:  295.70 (F25.0), Schizoaffective disorder, depressive type; 309.81 (F43.10) Posttraumatic Stress Disorder with panic specifier, history of chemical dependency issues (polysubstance dependence)  Psychosocial & Contextual Factors: Academics / Education - yes  Activities of Daily Living - yes  Financial management yes  Follow through with Medical recommendations - yes  Occupational / Vocational - yes  Social / Relational - yes, grief and loss  WHODAS Score: 30      Referral / Collaboration:  Referral to another professional/service is not indicated at this time..    Anticipated number of session or this episode of care: 20      MeasurableTreatment Goal(s) related to diagnosis / functional impairment(s)  Goal 1: Client will improve her ability to manage anxiety and mood states.     I will know I've met my goal when the man does not paralyze the me with fear.     Objective #A (Client Action)    Client will increase understanding of steps in the grief process.  Status: Continued - Date(s):  2/2/2021 The patient stated that she is grieving the death of her mother, sister and -she stated that she is working towards being able to let go of the ashes of her  as she believes this will help her move through grieving.      Intervention(s)  Therapist will teach emotional recognition/identification. teach the navigation of grieving encouraging acceptance and adjustment.    Objective #B  Client will Decrease frequency and intensity of feeling down, depressed, hopeless  Decrease thoughts that you'd be better off dead or of suicide / self-harm.  Status: Continued - Date(s): 2/2/2021 The patient reported that she has been able to have good  times and enjoy activities-she struggles with sleeping as she is waking during the night and having hard time falling asleep-regarding her mood she stated that she has been feeling good and stable-no thoughts of suicide.    Intervention(s)  Therapist will teach emotional regulation skills. with the use of mindful coping strategies and open communication of feelings and thoughts (getting it out to another person)    Client has reviewed and agreed to the above plan.      Richardson Lopez, [facility] Center[facility]  2/2/2021       ______________________________________________________________________

## 2021-04-06 NOTE — PATIENT INSTRUCTIONS
- Scan blood sugars at least 3 times per day.   - Novolog (Blue pen) with food only.   - Try ointment for the chest pain.   - Tessalon pearls for cough.   -Call clinic with any questions or concerns.

## 2021-04-13 ENCOUNTER — OFFICE VISIT (OUTPATIENT)
Dept: PALLIATIVE MEDICINE | Facility: CLINIC | Age: 61
End: 2021-04-13
Payer: COMMERCIAL

## 2021-04-13 DIAGNOSIS — M79.7 FIBROMYALGIA: ICD-10-CM

## 2021-04-13 DIAGNOSIS — M54.50 CHRONIC BILATERAL LOW BACK PAIN WITHOUT SCIATICA: ICD-10-CM

## 2021-04-13 DIAGNOSIS — G89.4 CHRONIC PAIN SYNDROME: Primary | ICD-10-CM

## 2021-04-13 DIAGNOSIS — G89.29 CHRONIC BILATERAL LOW BACK PAIN WITHOUT SCIATICA: ICD-10-CM

## 2021-04-13 PROCEDURE — 97110 THERAPEUTIC EXERCISES: CPT | Mod: GP | Performed by: PHYSICAL THERAPIST

## 2021-04-13 PROCEDURE — 97535 SELF CARE MNGMENT TRAINING: CPT | Mod: GP | Performed by: PHYSICAL THERAPIST

## 2021-04-13 NOTE — PROGRESS NOTES
Patient Name: Nena Tang      YOB: 1960     Medical Record Number: [id number]  Diagnosis:    Chronic pain syndrome  Fibromyalgia  Chronic bilateral low back pain without sciatica    Visit Info Length of Visit: 45 min  Therapeutic Procedures/Exercises: 20 min; Therapeutic Activities: NA; Neuromuscular Re-education: NA;  Self Care / Home Management Trainin min     Exercise/Activity Education Instruction:  Home Exercise Program:   - Hallway walk using 4ww - 600 ft in 9 min - took a standing pause break at 200 ft and a seated break at 350 ft.  Continue to encourage walking program with 4ww - as weather gets nicer consider walking outside.  - Re-instructed in sit to stand exercise.  Used one pillow on chair to make it challenging but more comfortable on her hips.  Performed 3 reps then trialed from exercise plinth which is more comparable to the height of her bed.  Start with 5-7 reps at home, progress reps as efren  Self Care:   - instructed in use of restorative supports for sidelying and supine  - use ice and or heat at injection sites  - reviewed benefits of using her walker when out in community (going to drop in center, out with family, to appts etc).  Pt reports feeling greater stability and less effort when using walker during PT.  Positive benefits discussed include - EC, greater stability, less fatigue, potential to go further or for longer periods of time.       Notes:   Patient reports: she's doing about the same.  Continues to go to drop in center.  Stress levels continue to be about the same.     HEP/Self Care/Home Management Training:   HEP Does some exercise at [facility] in [place] - she did try to modify some of the exs when they were too challenging and the modifications went well.  Not doing much of any exercise on her own at home currently.  Self Care: nothing consistent other than rest   Body Awareness: none     Demonstrates/Verbalizes Technique: 3 (1= poor technique-difficulty  performing exercises,significant cues required; 5= good technique-performs exercises without cues)  Body Awareness: 2 (1=low awareness; 5=high awareness)  Posture/Stability: 2 (1= poor posture, stability; 5= good posture, stability)   Motivational Level:   Cooperative Participation:  Full   Patient Satisfaction:  satisfied   Response to Teaching:   demonstrated ability and verbalized, recall/understanding  Factors that affect learning: low self motivation, slow processor / learner   Plan of Care  Cont PT to support reactivation and integration of self regulation pain management skills.   Therapist: Pam Be, PT                  Date: 4/13/2021

## 2021-04-23 ENCOUNTER — TELEPHONE (OUTPATIENT)
Dept: FAMILY MEDICINE | Facility: CLINIC | Age: 61
End: 2021-04-23

## 2021-04-23 NOTE — TELEPHONE ENCOUNTER
Forms completed and faxed back to [facility] at [phone]. Placed in abstraction to be scanned into pt's chart.

## 2021-04-30 ENCOUNTER — MEDICAL CORRESPONDENCE (OUTPATIENT)
Dept: HEALTH INFORMATION MANAGEMENT | Facility: CLINIC | Age: 61
End: 2021-04-30

## 2021-05-07 ENCOUNTER — VIRTUAL VISIT (OUTPATIENT)
Dept: PALLIATIVE MEDICINE | Facility: CLINIC | Age: 61
End: 2021-05-07
Payer: COMMERCIAL

## 2021-05-07 ENCOUNTER — MEDICAL CORRESPONDENCE (OUTPATIENT)
Dept: HEALTH INFORMATION MANAGEMENT | Facility: CLINIC | Age: 61
End: 2021-05-07

## 2021-05-07 DIAGNOSIS — G89.29 CHRONIC RIGHT-SIDED LOW BACK PAIN WITHOUT SCIATICA: ICD-10-CM

## 2021-05-07 DIAGNOSIS — M53.3 SI (SACROILIAC) JOINT DYSFUNCTION: Primary | ICD-10-CM

## 2021-05-07 DIAGNOSIS — M70.61 TROCHANTERIC BURSITIS OF RIGHT HIP: ICD-10-CM

## 2021-05-07 DIAGNOSIS — M54.50 CHRONIC RIGHT-SIDED LOW BACK PAIN WITHOUT SCIATICA: ICD-10-CM

## 2021-05-07 PROCEDURE — 96158 HLTH BHV IVNTJ INDIV 1ST 30: CPT | Mod: 95 | Performed by: PSYCHOLOGIST

## 2021-05-07 NOTE — PROGRESS NOTES
"Nena Tang is a 61 year old female who is being evaluated via a billable telephone visit.      The patient has been notified of following:     \"This telephone visit will be conducted via a call between you and Sonya Kohler PsyD LP. We have found that certain health care needs can be provided without the need for an in-person session.  This service lets us provide the care you need with a phone conversation.       If during the course of the call I feel a telephone visit is not appropriate, you will not be charged for this service.\"      Reviewed that patient is in a quiet private place, no recording without permission, all apps and notifications of any devices are turned off. Began the session by talking about the risks and benefits of telehealth, what to do if there is a break in the connection, reviewed the safety protocol for during and after sessions. The purpose of using telehealth for this session was due to the COVID-19 pandemic and was to reduce the spread of the disease and protect both the clinician and client.             Patient has given verbal consent for telephone visit? YES    Phone call contact time  Call Started at 10:00 AM  Call Ended at 10:25 AM    Total 25 minutes     Pain Diagnoses per pain provider:   SI (sacroiliac) joint dysfunction     Trochanteric bursitis of right hip     Chronic right-sided low back pain without sciatica       DATA: During today's visit you reported the following: Your pain is still present since injection, just not as bad. Lower back pain has improved, easing up a little bit. Your mood is unchanged, still experiencing up and downs. Your activity level is the same - getting to drop in center 3x per week. Your stress level is unchanged. Your sleep is not great - only just received all the components of CPAP and have not started use yet. Still report obtaining 7-8 hours of sleep but this is not consecutive, broken up by need to urinate. You reported engaging in " self-care for your pain 1-2 times daily.    You identified that you would like to focus on the following or had questions regarding the following issues or concerns, and we discussed the following:   - feeling very tired - haven't started using CPAP, but plan to tonight/this weekend - shared this can take time to get used to  - working on Mother's Day cards at drop in center today  - not using walker right now  - patient again does not recall meeting with me a month ago, reports she struggles in general to recall providers  - discussed ways she is taking care of herself  - strongly encouraged to start using CPAP again  - walking 2-3 times daily 5-10 minutes; not currently using walker but feels she might benefit from using walker to help with balance and take breaks by using the seat - she was strongly encouraged to use walker to provide balance and as a way to take breaks so that she can continue to build stamina    ASSESSMENT: Patient may benefit from Critical access hospital support if eligible to provide support in-home, as well as to continue behavioral health therapy in person. Nena reports she enjoys pain psychology telephone check ins, so we will continue to meet and see if she retains more in between, especially as she starts to use her CPAP again.    PLAN:   Your next appointment is scheduled for 6/4 at 3:30 PM.  Assignment/Objectives /interventions for next session:   - continue to walk daily as able 5-10 minutes, use walker for balance and stability  - start using CPAP again    Telephone-Visit Details    Type of service:  Telephone Visit    Originating Location (pt. Location): Home    Distant Location (provider location):  [place] [facility]     Mode of Communication:  Telephone    I have reviewed the note as documented above.  This accurately captures the substance of my conversation with the patient.    Sonya Kohler PsyD LP  Licensed Psychologist  Outpatient Clinic Therapist  [facility]    Disclaimer: This note consists of symbols derived from keyboarding, dictation and/or voice recognition software. As a result, there may be errors in the script that have gone undetected. Please consider this when interpreting information found in this chart.

## 2021-05-10 ENCOUNTER — TELEPHONE (OUTPATIENT)
Dept: FAMILY MEDICINE | Facility: CLINIC | Age: 61
End: 2021-05-10

## 2021-05-10 ENCOUNTER — TELEPHONE (OUTPATIENT)
Dept: PALLIATIVE MEDICINE | Facility: CLINIC | Age: 61
End: 2021-05-10

## 2021-05-10 NOTE — TELEPHONE ENCOUNTER
FRANCHESCA Guaman,  Patient called and wanted to let you know she is in [facility]. She could not walk and was brought to the ER. She has a bladder infection and blood infection.    Thanks  Tram Mccain RN   [facility]

## 2021-05-10 NOTE — TELEPHONE ENCOUNTER
Clinic call patient with her appointment reminders for tomorrow (Tues. 5- at 2:00 and 3:00). Patient said she's currently in the hospital and won't be able to make those appointments; has been in the hospital for a few days. When given the option to reschedule the appointments now or later (once she's out of the hospital and knows when she's able to get to appointments), patient chose the latter option (to wait until after she's out of the hospital, before rescheduling these appts).             Both of appts for 5- have been cancelled. Forwarding this to patient's pain management center providers ([facility]) as VALDEZ Patel  Patient Representative  [facility]

## 2021-05-12 ENCOUNTER — TELEPHONE (OUTPATIENT)
Dept: FAMILY MEDICINE | Facility: CLINIC | Age: 61
End: 2021-05-12

## 2021-05-12 NOTE — TELEPHONE ENCOUNTER
MATY Haas CNP-Please review and advise if home care orders may be given.  Please route encounter to appropriate care team pool.  Call received by [facility].    Call received from nurse Hutchinson with [employer]:  1. Requesting verbal orders for resumption of skilled nursing services    Evangelina informed message will be sent to patient's PCP.    Evangelina's voicemail is confidential.  May leave detailed message.    Thank you!  CYNTHIA LanierN, RN  [facility]

## 2021-05-14 ENCOUNTER — MEDICAL CORRESPONDENCE (OUTPATIENT)
Dept: HEALTH INFORMATION MANAGEMENT | Facility: CLINIC | Age: 61
End: 2021-05-14
Payer: COMMERCIAL

## 2021-05-14 ENCOUNTER — TELEPHONE (OUTPATIENT)
Dept: FAMILY MEDICINE | Facility: CLINIC | Age: 61
End: 2021-05-14

## 2021-05-14 NOTE — TELEPHONE ENCOUNTER
Forms completed and faxed back to [facility] @ [phone]. Placed into Abstraction to scan into patients chart.   Michael Gustafson MA

## 2021-05-18 ENCOUNTER — OFFICE VISIT (OUTPATIENT)
Dept: FAMILY MEDICINE | Facility: CLINIC | Age: 61
End: 2021-05-18
Payer: COMMERCIAL

## 2021-05-18 ENCOUNTER — OFFICE VISIT (OUTPATIENT)
Dept: PHARMACY | Facility: CLINIC | Age: 61
End: 2021-05-18
Payer: COMMERCIAL

## 2021-05-18 VITALS
WEIGHT: 218 LBS | SYSTOLIC BLOOD PRESSURE: 126 MMHG | HEART RATE: 79 BPM | DIASTOLIC BLOOD PRESSURE: 74 MMHG | BODY MASS INDEX: 42.8 KG/M2 | TEMPERATURE: 97.4 F | HEIGHT: 60 IN | OXYGEN SATURATION: 96 %

## 2021-05-18 DIAGNOSIS — E11.3299 TYPE 2 DIABETES MELLITUS WITH MILD NONPROLIFERATIVE RETINOPATHY WITHOUT MACULAR EDEMA, WITH LONG-TERM CURRENT USE OF INSULIN, UNSPECIFIED LATERALITY (H): ICD-10-CM

## 2021-05-18 DIAGNOSIS — F25.1 SCHIZOAFFECTIVE DISORDER, DEPRESSIVE TYPE (H): ICD-10-CM

## 2021-05-18 DIAGNOSIS — Z09 HOSPITAL DISCHARGE FOLLOW-UP: ICD-10-CM

## 2021-05-18 DIAGNOSIS — R21 RASH AND NONSPECIFIC SKIN ERUPTION: ICD-10-CM

## 2021-05-18 DIAGNOSIS — Z79.4 TYPE 2 DIABETES MELLITUS WITH MILD NONPROLIFERATIVE RETINOPATHY WITHOUT MACULAR EDEMA, WITH LONG-TERM CURRENT USE OF INSULIN, UNSPECIFIED LATERALITY (H): ICD-10-CM

## 2021-05-18 DIAGNOSIS — R78.81 BACTEREMIA: Primary | ICD-10-CM

## 2021-05-18 DIAGNOSIS — Z09 HOSPITAL DISCHARGE FOLLOW-UP: Primary | ICD-10-CM

## 2021-05-18 LAB
ALBUMIN SERPL-MCNC: 3.5 G/DL (ref 3.4–5)
ALP SERPL-CCNC: 105 U/L (ref 40–150)
ALT SERPL W P-5'-P-CCNC: 54 U/L (ref 0–50)
ANION GAP SERPL CALCULATED.3IONS-SCNC: 7 MMOL/L (ref 3–14)
AST SERPL W P-5'-P-CCNC: 15 U/L (ref 0–45)
BILIRUB SERPL-MCNC: 0.2 MG/DL (ref 0.2–1.3)
BUN SERPL-MCNC: 16 MG/DL (ref 7–30)
CALCIUM SERPL-MCNC: 9 MG/DL (ref 8.5–10.1)
CHLORIDE SERPL-SCNC: 104 MMOL/L (ref 94–109)
CO2 SERPL-SCNC: 22 MMOL/L (ref 20–32)
CREAT SERPL-MCNC: 0.97 MG/DL (ref 0.52–1.04)
GFR SERPL CREATININE-BSD FRML MDRD: 63 ML/MIN/{1.73_M2}
GLUCOSE SERPL-MCNC: 376 MG/DL (ref 70–99)
POTASSIUM SERPL-SCNC: 4.5 MMOL/L (ref 3.4–5.3)
PROT SERPL-MCNC: 7.6 G/DL (ref 6.8–8.8)
SODIUM SERPL-SCNC: 133 MMOL/L (ref 133–144)

## 2021-05-18 PROCEDURE — 99215 OFFICE O/P EST HI 40 MIN: CPT | Performed by: NURSE PRACTITIONER

## 2021-05-18 PROCEDURE — 36415 COLL VENOUS BLD VENIPUNCTURE: CPT | Performed by: NURSE PRACTITIONER

## 2021-05-18 PROCEDURE — 99606 MTMS BY PHARM EST 15 MIN: CPT | Performed by: PHARMACIST

## 2021-05-18 PROCEDURE — 99607 MTMS BY PHARM ADDL 15 MIN: CPT | Performed by: PHARMACIST

## 2021-05-18 PROCEDURE — 80053 COMPREHEN METABOLIC PANEL: CPT | Performed by: NURSE PRACTITIONER

## 2021-05-18 RX ORDER — NYSTATIN 100000 [USP'U]/G
POWDER TOPICAL 2 TIMES DAILY PRN
Qty: 45 G | Refills: 1 | Status: SHIPPED | OUTPATIENT
Start: 2021-05-18 | End: 2021-09-02

## 2021-05-18 RX ORDER — INSULIN ASPART 100 [IU]/ML
6 INJECTION, SOLUTION INTRAVENOUS; SUBCUTANEOUS
Qty: 12 ML | Refills: 1 | Status: ON HOLD | OUTPATIENT
Start: 2021-05-18 | End: 2021-08-13

## 2021-05-18 ASSESSMENT — MIFFLIN-ST. JEOR: SCORE: 1480.34

## 2021-05-18 NOTE — PROGRESS NOTES
Medication Therapy Management (MTM) Encounter    ASSESSMENT:                            Medication Adherence/Access: See below for considerations. Will attempt to connect with HCRN to clarify medication instructions post discharge.    Bacteremia: At this point considering resolution of symptoms and no antibiotic x5 days, will not start cefuroxime per discharge instructions to finish 10 day course.      Type 2 Diabetes: A1c not at goal <7%. Due to reduced food intake, will restart insulins per discharge instructions and titrate.  Also restart CGM monitoring    Schizoaffective: appears to be a medication reconciliation error that patient was given sertraline instead of escitalopram during [facility] admission.  Will resume escitalopram as prescribed by psychiatry.   Discussed change in [facility] and patient is willing to engage with a new therapist. Will assist with coordination for follow-up.     PLAN:                            1. Do not restart antibiotic  2. Restart Lantus at 35u daily and Novolog at 6 units with meals. Restart home glucose monitoring with CGM.  3. Continue taking escitalopram. Remain off sertraline.  4. Called HCRN Blanka and DANNI regarding the above information    Follow-up: 1 week    SUBJECTIVE/OBJECTIVE:                          Nena Tang is a 61 year old female coming in for a transitions of care visit. She was discharged from [facility] on 5/12/21 for Bacteremia 2/2 UTI. Today's visit is a co-visit with PCP.       Reason for visit: medication check.     Tobacco: She reports that she has never smoked. She has never used smokeless tobacco.  Alcohol: not currently using      Medication Adherence/Access:   No medications x4 days, restarted pills yesterday however reports hasn't received her antibiotic. She did not restarted insulin because worried about the dose.  [phone] HCRN Blanka    Bacteremia: Did not  and take antibiotic from discharge. Patient states her HCRN called [facility] pharmacy and  did not have record of the antibiotic, thinks it had been sent to [facility] pharmacy but called and they did not have Rx on file either. Per [facility] records, she did receive 5 days of IV ceftriaxone. She is feeling much better since hospitalization, more energy, leg weakness/balance has improved.    Type 2 Diabetes: Pt currently prescribed Lantus 35u daily in AM (not taking), Novolog 6u BID (not taking), Trulicity 1.5mg weekly (taking, no missed doses). Pt is not experiencing side effects.    SMBG: CGM. Hasn't been checking since hospitalization. Now has Gabby sensors, brought into clinic and asked for help applying.  Hypoglycemia: none she is aware of  Recent symptoms of high blood sugar? none  Eye exam: due  Foot exam: up to date  ACEi/ARB: losartan (per discharge note, supposed to be on hold but was on discharge med list)  Urine Albumin:   Lab Results   Component Value Date    MICROL 7 09/15/2020   Aspirin: Taking 81mg daily and denies side effects  Diet/Exercise: low appetite, eating twice a day but not as much.  Lab Results   Component Value Date    A1C 9.1 12/01/2020    A1C 9.7 09/15/2020    A1C 8.6 06/30/2020    A1C 6.2 12/03/2019    A1C 6.6 08/06/2019       Schizoaffective: Currently taking escitalopram 10mg daily (?sertraline per discharge paperwork), Invega 9mg daily, amantadine 100mg AM and 200mg PM, trazodone 100mg HS, clonazepam 0.5mg HS (per discharge summary on hold, however was listed to restart on her discharge med list). She is not using CPAP but picked up new supplies.  Mood has been stable. Attending drop-in center. Able to engage more in exercise since hospitalization since she is feeling better.  Continues to live with her sister but reports she has been accepted for her own housing and plans to live independently.     Today's Vitals: LMP 01/06/2015 (Exact Date)    BP Readings from Last 1 Encounters:   05/18/21 126/74     Pulse Readings from Last 1 Encounters:   05/18/21 79     Wt Readings from Last 1  Encounters:   05/18/21 218 lb (98.9 kg)     Ht Readings from Last 1 Encounters:   05/18/21 5' (1.524 m)     Estimated body mass index is 42.58 kg/m  as calculated from the following:    Height as of an earlier encounter on 5/18/21: 5' (1.524 m).    Weight as of an earlier encounter on 5/18/21: 218 lb (98.9 kg).    Temp Readings from Last 1 Encounters:   05/18/21 97.4  F (36.3  C) (Temporal)      ----------------  Post Discharge Medication Reconciliation Status: discharge medications reconciled and changed, per note/orders.    I spent 50 minutes with this patient today. All changes were made via collaborative practice agreement with Rowena Haas. A copy of the visit note was provided to the patient's primary care provider.    The patient was given a summary of these recommendations. See Provider note/AVS from today.     Eugenia De Jesus, PharmD, BCACP         Medication Therapy Recommendations  Schizoaffective disorder, depressive type (H)    Current Medication: insulin glargine (LANTUS PEN) 100 UNIT/ML pen   Rationale: Does not understand instructions - Adherence - Adherence   Recommendation: Provide Education - start Lantus and Novolog   Status: Patient Agreed - Adherence/Education

## 2021-05-18 NOTE — PROGRESS NOTES
SUBJECTIVE:                                                    Nena Tang is a 61 year old female who presents to clinic today for the following health issues:  [facility]: [facility] Follow-up Visit:  Hospital/Nursing Home/IP Rehab Facility: [facility]  Date of Admission: 5/8/2021  Date of Discharge: 5/12/2021  Reason(s) for Admission: Weakness and Bacteremia due to E.coli.  Reports that she had a hard time getting to the bathroom and fell. Her sister called 911 nand she was admitted at [facility] for a UTI and also a possible stroke. Patient reports that she has been off all her medications for 4 days since her discharge.     Diabetes: Not currently checking her BG or using her insulin. Has been able to eats about 3 small meals per day with decreased appetite.   Taking Trulicity every Friday. Has both Lantus and Novolog at home. Nurse set-up her medications.    Some weakness since the hospitalization- feels more stable now and is looking forward to working with physical therapy. Reports some issues while walking at the drop in center prior to her admission, otherwise has greatly improved since.       Was your hospitalization related to COVID-19? No   Problems taking medications regularly:  None  Medication changes since discharge: None  Problems adhering to non-medication therapy:  Did not have any of her medications for the past 4 days.     Summary of hospitalization:  CareEverywhere information obtained and reviewed  Diagnostic Tests/Treatments reviewed.  Follow up needed:     RECOMMENDATIONS AND FOLLOWUP:  -Continue Cefuroxime 500 mg BID for 5 more days - end date: 5/17/21  -Discharged on 35U Lantus and Aspart 6 TID. Recommend follow up with PCP in 1 week to readdress and determine appropriate dosing, sooner if 2 reads > 300.   -Held losartan given initial Acute Kidney Injury. Recommended following up outpatient to restart.   -Recommend further workup on outpatient basis for macrocytic anemia    Other Healthcare  Providers Involved in Patient s Care:         Physical Therapy and MTM  Update since discharge: improved.       Post Discharge Medication Reconciliation: discharge medications reconciled, continue medications without change. Plan to discontinue her antibiotics since she has been off medications for 4 days.   Plan of care communicated with patient                Mental Health Follow up   Patient notes that she has been going to the drop in center 3 times per week. Reports that she will be moving out to her own place- got accepted for her own housing.  Notes that she is scared and nervous about it but looking forward to being by herself.     Status since last visit: improving.     See PHQ-9 for current symptoms.  Other associated symptoms: None    Complicating factors: medication delivery.   Significant life event:  No   Current substance abuse:  None  Anxiety or Panic symptoms:  No    Sleep - Picked up all her CPAP supplies and will be starting.   Appetite - fair.   Exercise - walking.       PHQ-9  English PHQ-9   Any Language               Problems taking medications regularly: No    Medication side effects: none    Diet: regular (no restrictions)    Social History     Tobacco Use     Smoking status: Never Smoker     Smokeless tobacco: Never Used   Substance Use Topics     Alcohol use: No     Frequency: Never     Comment: last month        Problem list and histories reviewed & adjusted, as indicated.  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 (H)     Illiterate     Irritable bowel syndrome     overweight - BMI >35     Takotsubo cardiomyopathy     CAD (coronary artery disease)     Restless legs syndrome (RLS)     CINDY (obstructive sleep apnea)- mild AHI 10.3     Verbal auditory hallucination     Chronic  low back pain     Schizoaffective disorder, depressive type (H)     Migraine headache     HTN, goal below 140/90     Health Care Home     Lumbago     Cervicalgia     Cocaine abuse, episodic (H)     Suicidal ideation     Esophageal reflux     Mild nonproliferative diabetic retinopathy (H)     Tardive dyskinesia     Alcohol use     Left cataract     Falls frequently     History of uterine cancer     Psychophysiological insomnia     Dysuria     Asymptomatic postmenopausal status     Abdominal pain, right lower quadrant     Infectious encephalopathy     Non-intractable vomiting with nausea     Thoughts of self harm     Gastroenteritis     Posttraumatic stress disorder     Cervical cancer screening     Type 2 diabetes mellitus with stage 3 chronic kidney disease, with long-term current use of insulin (H)     Positive AIDA (antinuclear antibody)     Hyperglycemia     Pneumonia     Depression with suicidal ideation     Mixed incontinence     Chronic pain syndrome     Fibromyalgia     Past Surgical History:   Procedure Laterality Date     C OOPHORECTORMY FOR MALIG, W/BX  1983    UTERINE     CATARACT IOL, RT/LT Bilateral 2017     COLONOSCOPY N/A 3/16/2017    Procedure: COLONOSCOPY;  Surgeon: Traci Gonzalez MD;  Location:  GI     Coronary CTA  5/21/2014     HYSTERECTOMY  1983    uterine cancer yearly pap's per provider.     LAPAROSCOPIC CHOLECYSTECTOMY  2008     PHACOEMULSIFICATION CLEAR CORNEA WITH STANDARD INTRAOCULAR LENS IMPLANT Left 5/5/2017    Procedure: PHACOEMULSIFICATION CLEAR CORNEA WITH STANDARD INTRAOCULAR LENS IMPLANT;  LEFT EYE PHACOEMULSIFICATION CLEAR CORNEA WITH STANDARD INTRAOCULAR LENS IMPLANT ;  Surgeon: Tyra Diaz MD;  Location: [facility]     PHACOEMULSIFICATION CLEAR CORNEA WITH STANDARD INTRAOCULAR LENS IMPLANT Right 6/30/2017    Procedure: PHACOEMULSIFICATION CLEAR CORNEA WITH STANDARD INTRAOCULAR LENS IMPLANT;  RIGHT EYE PHACOEMULSIFICATION CLEAR CORNEA WITH STANDARD INTRAOCULAR LENS  IMPLANT;  Surgeon: Tyra Diaz MD;  Location:  [facility]     RELEASE TRIGGER FINGER  10/11/2012    Left thumb. Procedure: RELEASE TRIGGER FINGER;  LEFT THUMB TRIGGER RELEASE;  Surgeon: Tay Langley MD;  Location:  [facility]     RELEASE TRIGGER FINGER Right 2016    Procedure: RELEASE TRIGGER FINGER;  Surgeon: Albino Castañeda MD;  Location:  OR       Social History     Tobacco Use     Smoking status: Never Smoker     Smokeless tobacco: Never Used   Substance Use Topics     Alcohol use: No     Frequency: Never     Comment: last month     Family History   Problem Relation Age of Onset     Cancer Mother         BLADDER     Respiratory Mother         COPD     Gastrointestinal Disease Mother         CIRRHOSIS OF LI BOLIVAR     Alcohol/Drug Mother      Diabetes Mother      Hypertension Mother      Lipids Mother      C.A.D. Mother      Glaucoma Mother      Alcohol/Drug Sister      Mental Illness Sister      Alcohol/Drug Sister      Psychotic Disorder Sister      Cancer Maternal Grandmother         UNKNOWN TYPE     Cancer Brother         COLON     Cancer - colorectal Brother         IN HIS LATE 30S     Alcohol/Drug Brother          OF HEROIN OVERDOSE AT AGE 22 YRS     Macular Degeneration No family hx of            Current Outpatient Medications   Medication Sig Dispense Refill     alcohol swab prep pads Use to swab area of injection/rodolfo as directed. 100 each 3     amantadine (SYMMETREL) 100 MG capsule Take 1 capsule (100 mg)  in the morning and 2 capsules (200 mg) in the evening. 60 capsule 3     aspirin (ASA) 81 MG EC tablet TAKE 1 TABLET (81MG) BY MOUTH DAILY 90 tablet 3     atorvastatin (LIPITOR) 80 MG tablet TAKE 1 TABLET (80MG) BY MOUTH DAILY 30 tablet 11     benzonatate (TESSALON) 100 MG capsule Take 1 capsule (100 mg) by mouth 3 times daily as needed for cough 90 capsule 1     blood glucose (NO BRAND SPECIFIED) test strip Use to test blood sugar 4 times daily or as directed. 100 strip 11     clonazePAM  (KLONOPIN) 0.5 MG tablet Take 1 tablet (0.5 mg) by mouth At Bedtime 30 tablet 0     Continuous Blood Gluc Sensor (FREESTYLE LEBRON 14 DAY SENSOR) MISC 1 Device every 14 days Change sensor as directed every 14 days 2 each 11     diclofenac (VOLTAREN) 1 % topical gel Apply 4 g topically 4 times daily 350 g 1     escitalopram (LEXAPRO) 10 MG tablet Take 1 tablet (10 mg) by mouth daily 30 tablet 3     famotidine (PEPCID) 20 MG tablet Take 1 tablet (20 mg) by mouth daily 90 tablet 0     fluticasone-salmeterol (ADVAIR) 250-50 MCG/DOSE inhaler Inhale 1 puff into the lungs every 12 hours as needed       gabapentin (NEURONTIN) 300 MG capsule TAKE 1 CAPSULE (300MG) BY MOUTH AT BEDTIME 30 capsule 3     hydrOXYzine (VISTARIL) 25 MG capsule Take 1 capsule (25 mg) by mouth every 4 hours as needed for anxiety 60 capsule 3     insulin aspart (NOVOLOG FLEXPEN) 100 UNIT/ML pen Inject 28 Units Subcutaneous 2 times daily (with meals)       insulin glargine (LANTUS PEN) 100 UNIT/ML pen Inject 60 Units Subcutaneous every morning       insulin pen needle (NOVOFINE 30) 30G X 8 MM miscellaneous USE 4 DAILY OR AS DIRECTED 400 each 1     ipratropium - albuterol 0.5 mg/2.5 mg/3 mL (DUONEB) 0.5-2.5 (3) MG/3ML neb solution Take 1 vial (3 mLs) by nebulization every 6 hours as needed for shortness of breath / dyspnea or wheezing 30 vial 0     losartan (COZAAR) 100 MG tablet TAKE 1 TABLET (100MG) BY MOUTH DAILY 90 tablet 0     metoprolol succinate ER (TOPROL-XL) 25 MG 24 hr tablet Take 2 tablets (50 mg) by mouth daily 180 tablet 3     miconazole (MICATIN/MICRO GUARD) 2 % external powder Apply topically once as needed       nystatin (MYCOSTATIN) 272294 UNIT/GM external cream Apply topically 2 times daily 30 g 3     order for DME Equipment being ordered: Depends. 30 each 4     order for DME Equipment being ordered: CPAP Supplies. 1 each 0     paliperidone ER (INVEGA) 9 MG 24 hr tablet TAKE 1 TABLET BY MOUTH AT BEDTIME 30 tablet 2     senna-docusate  (SENOKOT-S/PERICOLACE) 8.6-50 MG tablet Take 1 tablet by mouth 2 times daily as needed for constipation 60 tablet 10     thin (NO BRAND SPECIFIED) lancets Use with lanceting device. To accompany: Blood Glucose Monitor Brands: per insurance. 100 each 6     topiramate (TOPAMAX) 50 MG tablet Take 100mg (2 tab) by mouth in the am and 150mg (3 tabs) in the pm x 1 week, then increase to 150mg (3 tabs) twice daily. 180 tablet 3     traZODone (DESYREL) 100 MG tablet Take 1 tablet (100 mg) by mouth nightly as needed for sleep 30 tablet 3     TRULICITY 1.5 MG/0.5ML pen Inject 1.5 mg Subcutaneous once a week Every Friday 4 mL 3     zinc oxide (DESITIN) 40 % external ointment Apply topically as needed for dry skin or irritation 56 g 0     acetaminophen (TYLENOL) 650 MG CR tablet Take 1 tablet (650 mg) by mouth every 8 hours as needed for mild pain or fever (Patient not taking: Reported on 5/18/2021) 120 tablet 1     Allergies   Allergen Reactions     Imidazole Antifungals Hives     Tolerates diflucan     Ketoprofen Itching     Pruritis to topical     Lisinopril Hives     Metformin Other (See Comments)     Patient hospitalized for lactic acidosis - admitting provider suspectd caused by metformin     Metronidazole Hives     Posaconazole Hives     Tolerates diflucan     Recent Labs   Lab Test 12/01/20  1730 10/04/20  1224 09/15/20  1454 06/30/20  1624 12/22/19  0651 12/22/19  0651 08/06/19  1043 08/06/19  1043 05/24/19  0916 05/24/19  0916   A1C 9.1*  --  9.7* 8.6*  --   --    < > 6.6*  --   --    *  --   --   --   --  86  --  81  --   --    HDL 46*  --   --   --   --  42*  --  39*  --   --    TRIG 141  --   --   --   --  132  --  131  --   --    ALT  --  29 29 35   < > 22   < >  --    < > 30   CR  --  0.86 0.85 0.98   < > 0.86   < >  --    < > 0.87   GFRESTIMATED  --  73 74 63   < > 74   < >  --    < > 73   GFRESTBLACK  --  85 86 73   < > 85   < >  --    < > 85   POTASSIUM  --  4.2 4.4 4.3   < > 4.1   < >  --    < > 4.0    TSH  --   --   --   --   --  1.23  --   --   --  1.62    < > = values in this interval not displayed.      BP Readings from Last 3 Encounters:   05/18/21 126/74   04/06/21 130/66   04/05/21 (!) 148/78    Wt Readings from Last 3 Encounters:   05/18/21 98.9 kg (218 lb)   04/06/21 99.8 kg (220 lb)   03/16/21 98.9 kg (218 lb)          Labs reviewed in EPIC  Problem list, Medication list, Allergies, and Medical/Social/Surgical histories reviewed in [facility] and updated as appropriate.      ROS: Constitutional, neuro, ENT, endocrine, pulmonary, cardiac, gastrointestinal, genitourinary, musculoskeletal, integument and psychiatric systems are negative, except as otherwise noted above in the HPI.     OBJECTIVE:                                                    /74   Pulse 79   Temp 97.4  F (36.3  C) (Temporal)   Ht 1.524 m (5')   Wt 98.9 kg (218 lb)   LMP 01/06/2015 (Exact Date)   SpO2 96%   BMI 42.58 kg/m    Body mass index is 42.58 kg/m .    GENERAL: healthy, alert and no distress  EYES: Eyes grossly normal to inspection, PERRL and conjunctivae and sclerae normal  HENT: ear canals and TM's normal, nose and mouth without ulcers or lesions  NECK: no adenopathy, no asymmetry, masses, or scars and thyroid normal to palpation  RESP: lungs clear to auscultation - no rales, rhonchi or wheezes  BREAST: normal without masses, tenderness or nipple discharge and no palpable axillary masses or adenopathy  CV: regular rate and rhythm, normal S1 S2, no S3 or S4, no murmur, click or rub, no peripheral edema and peripheral pulses strong  ABDOMEN: soft, nontender, bowel sounds normal and unable to palpate for organomegaly due to body habitus.  MS: no gross musculoskeletal defects noted, no edema  SKIN: no suspicious lesions or rashes  NEURO: Normal strength and tone, mentation intact and speech normal  PSYCH: mentation appears normal, affect normal/bright    Mental Status Assessment:  Appearance:   Appropriate   Eye  Contact:   Good   Psychomotor Behavior: Normal   Attitude:   Cooperative   Orientation:   All  Speech   Rate / Production: Normal    Volume:  Normal   Mood:    Anxious  Normal  Affect:    Appropriate   Thought Content:  Clear   Thought Form:  Coherent  Logical   Insight:    Fair   Attention Span and Concentration:  limited  Recent and Remote Memory:  intact  Fund of Knowledge: appropriate  Muscle Strength and Tone: normal   Suicidal Ideation: reports no thoughts, no intention    Diagnostic Test Results:  Labs reviewed in Epic  none      ASSESSMENT/PLAN:                                                    1. Hospital discharge follow-up  Improved UTI symptoms. Patient reports that she has not taken any medications since she was discharged 4 days ago due to delivery issues.   - plan to discontinue antibiotic since patient has been off for about 5 days now.   - follow-up with MTM next week for medication check.   - **Comprehensive metabolic panel FUTURE anytime; Future    2. Type 2 diabetes mellitus with mild nonproliferative retinopathy without macular edema, with long-term current use of insulin, unspecified laterality (H)  Continue with the discharge doses of insulin until follow-up with MTM next week for insulin dose management.   - insulin glargine (LANTUS PEN) 100 UNIT/ML pen; Inject 35 Units Subcutaneous every morning  Dispense: 12 mL; Refill: 1  - insulin aspart (NOVOLOG FLEXPEN) 100 UNIT/ML pen; Inject 6 Units Subcutaneous 3 times daily (with meals)  Dispense: 12 mL; Refill: 1    3. Rash and nonspecific skin eruption  Patient with a yeast infection under bilateral breasts. Area is with erythema, no edema or discharge.   - Continue with nystatin powder as previously prescribed from hospital.   - Use cotton cloth under breast to wick moisture out.   - nystatin (MYCOSTATIN) [id number] UNIT/GM external powder; Apply topically 2 times daily as needed  Dispense: 45 g; Refill: 1    4. Schizoaffective disorder, depressive  type (H)  Stable. Continue with Lexapro as previously ordered and discontinue Zoloft.   No changes with clonazepam or Invega.      Patient Instructions   - Decrease Lantus to 35 units and Novolog to 6 units 2-3 times with meals.   - Discontinue Zoloft and start lexapro 10 mg.   - Start nystatin powder under breast.   - Will call with the lab results.       47 minutes spent on the date of the encounter doing chart review, history and exam, documentation and further activities as noted above regarding his recent hospital discharge.    ART Fay CNP  [facility]

## 2021-05-18 NOTE — PATIENT INSTRUCTIONS
- Decrease Lantus to 35 units and Novolog to 6 units 2-3 times with meals.   - Discontinue Zoloft and start lexapro 10 mg.   - Start nystatin powder under breast.   - Will call with the lab results.

## 2021-05-25 ENCOUNTER — OFFICE VISIT (OUTPATIENT)
Dept: PALLIATIVE MEDICINE | Facility: CLINIC | Age: 61
End: 2021-05-25
Payer: COMMERCIAL

## 2021-05-25 DIAGNOSIS — M54.50 CHRONIC BILATERAL LOW BACK PAIN WITHOUT SCIATICA: ICD-10-CM

## 2021-05-25 DIAGNOSIS — M79.7 FIBROMYALGIA: ICD-10-CM

## 2021-05-25 DIAGNOSIS — G89.4 CHRONIC PAIN SYNDROME: Primary | ICD-10-CM

## 2021-05-25 DIAGNOSIS — G89.29 CHRONIC BILATERAL LOW BACK PAIN WITHOUT SCIATICA: ICD-10-CM

## 2021-05-25 PROCEDURE — 97110 THERAPEUTIC EXERCISES: CPT | Mod: GP | Performed by: PHYSICAL THERAPIST

## 2021-05-25 PROCEDURE — 97535 SELF CARE MNGMENT TRAINING: CPT | Mod: GP | Performed by: PHYSICAL THERAPIST

## 2021-05-25 NOTE — PROGRESS NOTES
"Patient Name: Nena Tang      YOB: 1960     Medical Record Number: [id number]  Diagnosis:    Chronic pain syndrome  Fibromyalgia  Chronic bilateral low back pain without sciatica    Visit Info Length of Visit: 40 min  Therapeutic Procedures/Exercises: 30 min; Therapeutic Activities: NA; Neuromuscular Re-education: NA;  Self Care / Home Management Training: 10 min     Exercise/Activity Education Instruction:  Home Exercise Program:   - Instructed in benefits of consistent exercise as it relates to pain management and mood regulation (increase endorphins, aid with sleep, build energy reserve and endurance, improve flexibility for improved posture and body mechanics). Discussed doing some seated HEP and walking on \"off\" drop in center days for greater consistency.  Instructed in having spectrum of choices so she is choosing ex based on pain variability vs all or nothing approach.  - Reviewed some of her seated exs from drop in [place] and modified or added for complete UE and LE program from which she can choose for home program.  - Hallway walk using 4ww in 6 min with one rest break.  (~400 ft); continue to encourage walking for exercise as part of her HEP.    Self Care:   - reviewed benefits of using her walker when out in community (going to drop in center, out with family, to appts etc).  Pt reports feeling greater stability and less effort when using walker during PT.  Positive benefits discussed include - energy conservation, greater stability, less fatigue, potential to go further or for longer periods of time. Discussed how it will also support her recuperation efforts from recent hospitalization.       Notes: Pt was hospitalized over interval x 5 days.  Last pain PT visit on 4/13/21  Patient reports:   - still feeling weak after recent hospitalization  - just learned she was approved for independent housing  - has not been using any AD for her walking - says her 4ww is \"in storage\" in her " room  - just started back to her drop in center yesterday    Activity tolerance: easily fatigued with all activity (walking, transitioning etc)    HEP/Self Care/Home Management Training:   HEP going outside for walks - once in a while - easily tired after hospitalization so not doing frequently , did her drop in exs in sitting and took breaks as needed - just started back on Monday  Self Care: mostly just rest   Body Awareness: minimal    Pt had setback with recent hospitalization - lowered endurance and some increase in pain.       Demonstrates/Verbalizes Technique: 3, 4 (1= poor technique-difficulty performing exercises,significant cues required; 5= good technique-performs exercises without cues)  Body Awareness: 2, 3 (1=low awareness; 5=high awareness)  Posture/Stability: 2, 3 (1= poor posture, stability; 5= good posture, stability)   Motivational Level:   Cooperative Participation:  Full   Patient Satisfaction:  satisfied   Response to Teaching:   demonstrated ability and verbalized, recall/understanding  Factors that affect learning: low health literacy   Plan of Care  Cont PT to support reactivation and integration of self regulation pain management skills. F/u in two weeks in clinic.     Therapist: Pam Be, PT                  Date: 5/25/2021

## 2021-05-26 ENCOUNTER — VIRTUAL VISIT (OUTPATIENT)
Dept: PHARMACY | Facility: CLINIC | Age: 61
End: 2021-05-26
Payer: COMMERCIAL

## 2021-05-26 DIAGNOSIS — Z53.9 ERRONEOUS ENCOUNTER--DISREGARD: Primary | ICD-10-CM

## 2021-05-27 NOTE — PROGRESS NOTES
Attempted to reach patient for scheduled MTM visit. No answer, LVM.    Eugenia De Jesus, PharmD, BCACP

## 2021-05-28 DIAGNOSIS — I25.119 CORONARY ARTERY DISEASE INVOLVING NATIVE HEART WITH ANGINA PECTORIS, UNSPECIFIED VESSEL OR LESION TYPE (H): ICD-10-CM

## 2021-05-28 RX ORDER — LOSARTAN POTASSIUM 100 MG/1
TABLET ORAL
Qty: 30 TABLET | Refills: 5 | Status: SHIPPED | OUTPATIENT
Start: 2021-05-28 | End: 2021-12-17

## 2021-05-28 NOTE — TELEPHONE ENCOUNTER
"Requested Prescriptions   Signed Prescriptions Disp Refills    losartan (COZAAR) 100 MG tablet 30 tablet 5     Sig: TAKE 1 TABLET (100MG) BY MOUTH DAILY       Angiotensin-II Receptors Passed - 5/28/2021 10:46 AM        Passed - Last blood pressure under 140/90 in past 12 months     BP Readings from Last 3 Encounters:   05/18/21 126/74   04/06/21 130/66   04/05/21 (!) 148/78                 Passed - Recent (12 mo) or future (30 days) visit within the authorizing provider's specialty     Patient has had an office visit with the authorizing provider or a provider within the authorizing providers department within the previous 12 mos or has a future within next 30 days. See \"Patient Info\" tab in inbasket, or \"Choose Columns\" in Meds & Orders section of the refill encounter.              Passed - Medication is active on med list        Passed - Patient is age 18 or older        Passed - No active pregnancy on record        Passed - Normal serum creatinine on file in past 12 months     Recent Labs   Lab Test 05/18/21  1433 05/15/19  1834 05/15/19  1834   CR 0.97   < >  --    CREAT  --   --  0.9    < > = values in this interval not displayed.       Ok to refill medication if creatinine is low          Passed - Normal serum potassium on file in past 12 months     Recent Labs   Lab Test 05/18/21  1433   POTASSIUM 4.5                    Passed - No positive pregnancy test in past 12 months           Crissy Pulido RN  [facility]    "

## 2021-06-04 ENCOUNTER — NURSE TRIAGE (OUTPATIENT)
Dept: FAMILY MEDICINE | Facility: CLINIC | Age: 61
End: 2021-06-04

## 2021-06-04 ENCOUNTER — HOSPITAL ENCOUNTER (EMERGENCY)
Facility: CLINIC | Age: 61
Discharge: HOME | End: 2021-06-04
Attending: EMERGENCY MEDICINE | Admitting: EMERGENCY MEDICINE
Payer: COMMERCIAL

## 2021-06-04 VITALS
HEIGHT: 60 IN | SYSTOLIC BLOOD PRESSURE: 161 MMHG | OXYGEN SATURATION: 98 % | BODY MASS INDEX: 42.21 KG/M2 | RESPIRATION RATE: 16 BRPM | DIASTOLIC BLOOD PRESSURE: 76 MMHG | HEART RATE: 83 BPM | TEMPERATURE: 98.4 F | WEIGHT: 215 LBS

## 2021-06-04 DIAGNOSIS — N30.01 ACUTE CYSTITIS WITH HEMATURIA: ICD-10-CM

## 2021-06-04 LAB
ALBUMIN SERPL-MCNC: 3.2 G/DL (ref 3.4–5)
ALBUMIN UR-MCNC: NEGATIVE MG/DL
ALP SERPL-CCNC: 104 U/L (ref 40–150)
ALT SERPL W P-5'-P-CCNC: 29 U/L (ref 0–50)
ANION GAP SERPL CALCULATED.3IONS-SCNC: 3 MMOL/L (ref 3–14)
APPEARANCE UR: CLEAR
AST SERPL W P-5'-P-CCNC: 27 U/L (ref 0–45)
BACTERIA #/AREA URNS HPF: ABNORMAL /HPF
BASOPHILS # BLD AUTO: 0.1 10E9/L (ref 0–0.2)
BASOPHILS NFR BLD AUTO: 0.8 %
BILIRUB SERPL-MCNC: 0.3 MG/DL (ref 0.2–1.3)
BILIRUB UR QL STRIP: NEGATIVE
BUN SERPL-MCNC: 14 MG/DL (ref 7–30)
CALCIUM SERPL-MCNC: 9 MG/DL (ref 8.5–10.1)
CHLORIDE SERPL-SCNC: 112 MMOL/L (ref 94–109)
CO2 SERPL-SCNC: 23 MMOL/L (ref 20–32)
COLOR UR AUTO: ABNORMAL
CREAT SERPL-MCNC: 0.8 MG/DL (ref 0.52–1.04)
DIFFERENTIAL METHOD BLD: ABNORMAL
EOSINOPHIL # BLD AUTO: 0.3 10E9/L (ref 0–0.7)
EOSINOPHIL NFR BLD AUTO: 4.8 %
ERYTHROCYTE [DISTWIDTH] IN BLOOD BY AUTOMATED COUNT: 13.8 % (ref 10–15)
GFR SERPL CREATININE-BSD FRML MDRD: 80 ML/MIN/{1.73_M2}
GLUCOSE SERPL-MCNC: 153 MG/DL (ref 70–99)
GLUCOSE UR STRIP-MCNC: 300 MG/DL
HCT VFR BLD AUTO: 36.1 % (ref 35–47)
HGB BLD-MCNC: 11.4 G/DL (ref 11.7–15.7)
HGB UR QL STRIP: NEGATIVE
IMM GRANULOCYTES # BLD: 0.1 10E9/L (ref 0–0.4)
IMM GRANULOCYTES NFR BLD: 0.9 %
KETONES UR STRIP-MCNC: NEGATIVE MG/DL
LEUKOCYTE ESTERASE UR QL STRIP: ABNORMAL
LYMPHOCYTES # BLD AUTO: 2.2 10E9/L (ref 0.8–5.3)
LYMPHOCYTES NFR BLD AUTO: 34 %
MCH RBC QN AUTO: 32.2 PG (ref 26.5–33)
MCHC RBC AUTO-ENTMCNC: 31.6 G/DL (ref 31.5–36.5)
MCV RBC AUTO: 102 FL (ref 78–100)
MONOCYTES # BLD AUTO: 0.6 10E9/L (ref 0–1.3)
MONOCYTES NFR BLD AUTO: 8.9 %
MUCOUS THREADS #/AREA URNS LPF: PRESENT /LPF
NEUTROPHILS # BLD AUTO: 3.3 10E9/L (ref 1.6–8.3)
NEUTROPHILS NFR BLD AUTO: 50.6 %
NITRATE UR QL: NEGATIVE
NRBC # BLD AUTO: 0 10*3/UL
NRBC BLD AUTO-RTO: 0 /100
PH UR STRIP: 6.5 PH (ref 5–7)
PLATELET # BLD AUTO: 164 10E9/L (ref 150–450)
POTASSIUM SERPL-SCNC: 4.6 MMOL/L (ref 3.4–5.3)
PROT SERPL-MCNC: 7.5 G/DL (ref 6.8–8.8)
RBC # BLD AUTO: 3.54 10E12/L (ref 3.8–5.2)
RBC #/AREA URNS AUTO: 3 /HPF (ref 0–2)
SODIUM SERPL-SCNC: 139 MMOL/L (ref 133–144)
SOURCE: ABNORMAL
SP GR UR STRIP: 1.02 (ref 1–1.03)
SQUAMOUS #/AREA URNS AUTO: 10 /HPF (ref 0–1)
TRANS CELLS #/AREA URNS HPF: <1 /HPF (ref 0–1)
UROBILINOGEN UR STRIP-MCNC: NORMAL MG/DL (ref 0–2)
WBC # BLD AUTO: 6.4 10E9/L (ref 4–11)
WBC #/AREA URNS AUTO: 16 /HPF (ref 0–5)

## 2021-06-04 PROCEDURE — 85025 COMPLETE CBC W/AUTO DIFF WBC: CPT | Performed by: EMERGENCY MEDICINE

## 2021-06-04 PROCEDURE — 81001 URINALYSIS AUTO W/SCOPE: CPT | Performed by: EMERGENCY MEDICINE

## 2021-06-04 PROCEDURE — 250N000013 HC RX MED GY IP 250 OP 250 PS 637: Mod: UD,GY | Performed by: EMERGENCY MEDICINE

## 2021-06-04 PROCEDURE — A9270 NON-COVERED ITEM OR SERVICE: HCPCS | Mod: UD,GY | Performed by: EMERGENCY MEDICINE

## 2021-06-04 PROCEDURE — 80053 COMPREHEN METABOLIC PANEL: CPT | Performed by: EMERGENCY MEDICINE

## 2021-06-04 PROCEDURE — 99283 EMERGENCY DEPT VISIT LOW MDM: CPT | Performed by: EMERGENCY MEDICINE

## 2021-06-04 PROCEDURE — 99284 EMERGENCY DEPT VISIT MOD MDM: CPT | Performed by: EMERGENCY MEDICINE

## 2021-06-04 RX ORDER — CEPHALEXIN 500 MG/1
500 CAPSULE ORAL ONCE
Status: COMPLETED | OUTPATIENT
Start: 2021-06-04 | End: 2021-06-04

## 2021-06-04 RX ORDER — CEPHALEXIN 500 MG/1
500 CAPSULE ORAL 2 TIMES DAILY
Qty: 10 CAPSULE | Refills: 0 | Status: SHIPPED | OUTPATIENT
Start: 2021-06-04 | End: 2021-06-09

## 2021-06-04 RX ADMIN — CEPHALEXIN 500 MG: 500 CAPSULE ORAL at 21:01

## 2021-06-04 ASSESSMENT — ENCOUNTER SYMPTOMS
BACK PAIN: 0
PALPITATIONS: 0
FREQUENCY: 1
DYSURIA: 1
CHILLS: 0
ABDOMINAL DISTENTION: 0
FEVER: 0
SORE THROAT: 0
SHORTNESS OF BREATH: 0
VOMITING: 0
DIFFICULTY URINATING: 0
DIARRHEA: 0
CONSTIPATION: 0
COUGH: 0
DIZZINESS: 0
CHEST TIGHTNESS: 0
ABDOMINAL PAIN: 0
COLOR CHANGE: 0
FATIGUE: 0
NECK PAIN: 0
ARTHRALGIAS: 0
EYE PAIN: 0
CONFUSION: 0
FLANK PAIN: 0
NAUSEA: 0
WEAKNESS: 0
ROS GI COMMENTS: SUPRAPUBIC PAIN
MYALGIAS: 0
HEADACHES: 0

## 2021-06-04 ASSESSMENT — MIFFLIN-ST. JEOR: SCORE: 1466.73

## 2021-06-04 NOTE — TELEPHONE ENCOUNTER
"Spoke with pt regarding her symptoms - pt states that she is experiencing lower abdominal pain 7/10, is having very dark, cloudy urine, urinary frequency, dizziness and blurry vision. Pt reports that 2 weeks ago she was discharged from the hospital due to a bladder infection and a stroke and is concerned she has a bladder infection again. Advised pt to go to ED immediately per [facility] protocol, pt unable to drive due to her blurry vision and her friend that she is with does not have a car. Advised pt to call 911, pt verbalized understanding and stated she would call immediately.    Crissy Pulido RN  [facility]      Reason for Disposition    Sounds like a life-threatening emergency to the triager     Pt's symptoms concerning for new stroke and infection    Additional Information    Negative: Shock suspected (e.g., cold/pale/clammy skin, too weak to stand, low BP, rapid pulse)    Answer Assessment - Initial Assessment Questions  1. SYMPTOM: \"What's the main symptom you're concerned about?\" (e.g., frequency, incontinence)      Urinary frequency, urine is dark sheila colored and cloudy, lower abdominal pain, dizzy, blurry vision  2. ONSET: \"When did the  symptoms  start?\"      Most symptoms started a week ago, but dizziness and blurry vision started today. Was discharged from [facility] for bladder infection and small stroke 2 weeks ago.  3. PAIN: \"Is there any pain?\" If so, ask: \"How bad is it?\" (Scale: 1-10; mild, moderate, severe)      Yes lower abdominal pain 7/10  4. CAUSE: \"What do you think is causing the symptoms?\"      Thinks she may have a bladder infection again  5. OTHER SYMPTOMS: \"Do you have any other symptoms?\" (e.g., fever, flank pain, blood in urine, pain with urination)      Flank pain    Protocols used: URINARY SYMPTOMS-A-[place]      "

## 2021-06-04 NOTE — ED TRIAGE NOTES
Pt. Presents to ED with concerns for recurring UTI, reports pain in her legs and arms and that hger urine is more dark and cloudy. Also reports she is urinating more. Pt. Reports she was recently hospitalized for this. AVSS on RA. A & O x 4.

## 2021-06-04 NOTE — ED TRIAGE NOTES
BIBA [place] from home diagnosed with UTI never finished antibiotics. Has fatigue and lower abdominal pain. HX DM

## 2021-06-05 NOTE — ED PROVIDER NOTES
ED Provider Note  [facility]      History     Chief Complaint   Patient presents with     UTI     HPI  Nena Tang is a 61 year old female with history of coronary artery disease, hypertension, diabetes mellitus, schizoaffective disorder, presents to the ED with complaint of urinary symptoms.  Specifically, she notes dysuria, urinary frequency for the past 3 days.  She is also feeling diffusely weak.  She has pain in the suprapubic area that radiates down both of her legs sometimes up into her arms.  Of note, she was admitted to [facility] 3 weeks ago with similar symptoms.  At that time, she notes she had bacteremia and was on IV antibiotics for 5 days.  She now feels that her symptoms are reoccurring.  She admits to chills at home but denies any objective fever.  Denies any respiratory symptoms, upper abdominal pain, back pain, hematuria, and has no other medical complaints.    Past Medical History  Past Medical History:   Diagnosis Date     Acute respiratory failure with hypoxia (H) 9/4/2017     CAD (coronary artery disease)     5/2014 cath, nonbostructive stenosis to LAD, RCA.     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/2011     Noncompliance with medication regimen 6/8/2011     Obesity      CINDY (obstructive sleep apnea) 3/8/2012    uses CPAP     Osteopenia 10/7/2009     Pneumonia of right lower lobe due to infectious organism 9/4/2017     Schizoaffective disorder, depressive type (H) 2/25/2013     Sepsis (H) 8/29/2017     Suicidal intent 10/2/2013     Takotsubo cardiomyopathy      Type 2 diabetes mellitus (H) 8/30/2011     Uterine cancer (H) 1983     Verbal  auditory hallucination 10/4/2012     Past Surgical History:   Procedure Laterality Date     C OOPHORECTORMY FOR RAHEL, W/BX  1983    UTERINE     CATARACT IOL, RT/LT Bilateral 2017     COLONOSCOPY N/A 3/16/2017    Procedure: COLONOSCOPY;  Surgeon: Traci Gonzalez MD;  Location:  GI     Coronary CTA  5/21/2014     HYSTERECTOMY  1983    uterine cancer yearly pap's per provider.     LAPAROSCOPIC CHOLECYSTECTOMY  2008     PHACOEMULSIFICATION CLEAR CORNEA WITH STANDARD INTRAOCULAR LENS IMPLANT Left 5/5/2017    Procedure: PHACOEMULSIFICATION CLEAR CORNEA WITH STANDARD INTRAOCULAR LENS IMPLANT;  LEFT EYE PHACOEMULSIFICATION CLEAR CORNEA WITH STANDARD INTRAOCULAR LENS IMPLANT ;  Surgeon: Tyra Diaz MD;  Location:  [facility]     PHACOEMULSIFICATION CLEAR CORNEA WITH STANDARD INTRAOCULAR LENS IMPLANT Right 6/30/2017    Procedure: PHACOEMULSIFICATION CLEAR CORNEA WITH STANDARD INTRAOCULAR LENS IMPLANT;  RIGHT EYE PHACOEMULSIFICATION CLEAR CORNEA WITH STANDARD INTRAOCULAR LENS IMPLANT;  Surgeon: Tyra Diaz MD;  Location:  [facility]     RELEASE TRIGGER FINGER  10/11/2012    Left thumb. Procedure: RELEASE TRIGGER FINGER;  LEFT THUMB TRIGGER RELEASE;  Surgeon: Tay Langley MD;  Location:  [facility]     RELEASE TRIGGER FINGER Right 12/26/2016    Procedure: RELEASE TRIGGER FINGER;  Surgeon: Albino Castañeda MD;  Location: [facility] OR     cephALEXin (KEFLEX) 500 MG capsule  acetaminophen (TYLENOL) 650 MG CR tablet  alcohol swab prep pads  amantadine (SYMMETREL) 100 MG capsule  aspirin (ASA) 81 MG EC tablet  atorvastatin (LIPITOR) 80 MG tablet  benzonatate (TESSALON) 100 MG capsule  blood glucose (NO BRAND SPECIFIED) test strip  clonazePAM (KLONOPIN) 0.5 MG tablet  Continuous Blood Gluc Sensor (FREESTYLE LEBRON 14 DAY SENSOR) MISC  diclofenac (VOLTAREN) 1 % topical gel  escitalopram (LEXAPRO) 10 MG tablet  famotidine (PEPCID) 20 MG tablet  fluticasone-salmeterol (ADVAIR) 250-50 MCG/DOSE inhaler  gabapentin (NEURONTIN)  300 MG capsule  hydrOXYzine (VISTARIL) 25 MG capsule  insulin aspart (NOVOLOG FLEXPEN) 100 UNIT/ML pen  insulin glargine (LANTUS PEN) 100 UNIT/ML pen  insulin pen needle (NOVOFINE 30) 30G X 8 MM miscellaneous  ipratropium - albuterol 0.5 mg/2.5 mg/3 mL (DUONEB) 0.5-2.5 (3) MG/3ML neb solution  losartan (COZAAR) 100 MG tablet  metoprolol succinate ER (TOPROL-XL) 25 MG 24 hr tablet  nystatin (MYCOSTATIN) 642161 UNIT/GM external cream  nystatin (MYCOSTATIN) 714643 UNIT/GM external powder  order for DME  order for DME  paliperidone ER (INVEGA) 9 MG 24 hr tablet  senna-docusate (SENOKOT-S/PERICOLACE) 8.6-50 MG tablet  thin (NO BRAND SPECIFIED) lancets  topiramate (TOPAMAX) 50 MG tablet  traZODone (DESYREL) 100 MG tablet  TRULICITY 1.5 MG/0.5ML pen  zinc oxide (DESITIN) 40 % external ointment      Allergies   Allergen Reactions     Imidazole Antifungals Hives     Tolerates diflucan     Ketoprofen Itching     Pruritis to topical     Lisinopril Hives     Metformin Other (See Comments)     Patient hospitalized for lactic acidosis - admitting provider suspectd caused by metformin     Metronidazole Hives     Posaconazole Hives     Tolerates diflucan     Family History  Family History   Problem Relation Age of Onset     Cancer Mother         BLADDER     Respiratory Mother         COPD     Gastrointestinal Disease Mother         CIRRHOSIS OF LI BOLIVAR     Alcohol/Drug Mother      Diabetes Mother      Hypertension Mother      Lipids Mother      C.A.D. Mother      Glaucoma Mother      Alcohol/Drug Sister      Mental Illness Sister      Alcohol/Drug Sister      Psychotic Disorder Sister      Cancer Maternal Grandmother         UNKNOWN TYPE     Cancer Brother         COLON     Cancer - colorectal Brother         IN HIS LATE 30S     Alcohol/Drug Brother          OF HEROIN OVERDOSE AT AGE 22 YRS     Macular Degeneration No family hx of      Social History   Social History     Tobacco Use     Smoking status: Never Smoker     Smokeless  tobacco: Never Used   Substance Use Topics     Alcohol use: No     Frequency: Never     Comment: last month     Drug use: No     Comment: history of      Past medical history, past surgical history, medications, allergies, family history, and social history were reviewed with the patient. No additional pertinent items.       Review of Systems   Constitutional: Negative for chills, fatigue and fever.   HENT: Negative for congestion and sore throat.    Eyes: Negative for pain and visual disturbance.   Respiratory: Negative for cough, chest tightness and shortness of breath.    Cardiovascular: Negative for chest pain and palpitations.   Gastrointestinal: Negative for abdominal distention, abdominal pain, constipation, diarrhea, nausea and vomiting.        Suprapubic pain     Genitourinary: Positive for dysuria and frequency. Negative for difficulty urinating, flank pain and urgency.   Musculoskeletal: Negative for arthralgias, back pain, myalgias and neck pain.   Skin: Negative for color change and rash.   Neurological: Negative for dizziness, weakness and headaches.   Psychiatric/Behavioral: Negative for confusion.     A complete review of systems was performed with pertinent positives and negatives noted in the HPI, and all other systems negative.    Physical Exam   BP: (!) 140/69  Pulse: 79  Temp: 98.4  F (36.9  C)  Resp: 16  Height: 152.4 cm (5')  Weight: 97.5 kg (215 lb)  SpO2: 99 %  Physical Exam  Vitals signs and nursing note reviewed.   Constitutional:       General: She is not in acute distress.     Appearance: Normal appearance. She is not ill-appearing or toxic-appearing.   HENT:      Head: Normocephalic and atraumatic.      Nose: Nose normal.      Mouth/Throat:      Mouth: Mucous membranes are moist.   Eyes:      Pupils: Pupils are equal, round, and reactive to light.   Neck:      Musculoskeletal: Normal range of motion. No neck rigidity.   Cardiovascular:      Rate and Rhythm: Normal rate.      Pulses:  Normal pulses.      Heart sounds: Normal heart sounds.   Pulmonary:      Effort: Pulmonary effort is normal. No respiratory distress.      Breath sounds: Normal breath sounds.   Abdominal:      General: Abdomen is flat. There is no distension.      Tenderness: There is no right CVA tenderness or left CVA tenderness.      Comments: Mild tenderness palpation of the supra pubic area.  No upper abdominal tenderness.  No guarding rebound or other peritoneal signs.  Abdomen is otherwise soft.   Musculoskeletal: Normal range of motion.         General: No swelling or deformity.   Skin:     General: Skin is warm.      Capillary Refill: Capillary refill takes less than 2 seconds.   Neurological:      Mental Status: She is alert and oriented to person, place, and time.   Psychiatric:         Mood and Affect: Mood normal.         ED Course      Procedures               Results for orders placed or performed during the hospital encounter of 06/04/21   UA with Microscopic     Status: Abnormal   Result Value Ref Range    Color Urine Light Yellow     Appearance Urine Clear     Glucose Urine 300 (A) NEG^Negative mg/dL    Bilirubin Urine Negative NEG^Negative    Ketones Urine Negative NEG^Negative mg/dL    Specific Gravity Urine 1.021 1.003 - 1.035    Blood Urine Negative NEG^Negative    pH Urine 6.5 5.0 - 7.0 pH    Protein Albumin Urine Negative NEG^Negative mg/dL    Urobilinogen mg/dL Normal 0.0 - 2.0 mg/dL    Nitrite Urine Negative NEG^Negative    Leukocyte Esterase Urine Large (A) NEG^Negative    Source Clean catch urine     WBC Urine 16 (H) 0 - 5 /HPF    RBC Urine 3 (H) 0 - 2 /HPF    Bacteria Urine Few (A) NEG^Negative /HPF    Squamous Epithelial /HPF Urine 10 (H) 0 - 1 /HPF    Transitional Epi <1 0 - 1 /HPF    Mucous Urine Present (A) NEG^Negative /LPF   CBC with platelets differential     Status: Abnormal   Result Value Ref Range    WBC 6.4 4.0 - 11.0 10e9/L    RBC Count 3.54 (L) 3.8 - 5.2 10e12/L    Hemoglobin 11.4 (L) 11.7  - 15.7 g/dL    Hematocrit 36.1 35.0 - 47.0 %     (H) 78 - 100 fl    MCH 32.2 26.5 - 33.0 pg    MCHC 31.6 31.5 - 36.5 g/dL    RDW 13.8 10.0 - 15.0 %    Platelet Count 164 150 - 450 10e9/L    Diff Method Automated Method     % Neutrophils 50.6 %    % Lymphocytes 34.0 %    % Monocytes 8.9 %    % Eosinophils 4.8 %    % Basophils 0.8 %    % Immature Granulocytes 0.9 %    Nucleated RBCs 0 0 /100    Absolute Neutrophil 3.3 1.6 - 8.3 10e9/L    Absolute Lymphocytes 2.2 0.8 - 5.3 10e9/L    Absolute Monocytes 0.6 0.0 - 1.3 10e9/L    Absolute Eosinophils 0.3 0.0 - 0.7 10e9/L    Absolute Basophils 0.1 0.0 - 0.2 10e9/L    Abs Immature Granulocytes 0.1 0 - 0.4 10e9/L    Absolute Nucleated RBC 0.0    Comprehensive metabolic panel     Status: Abnormal   Result Value Ref Range    Sodium 139 133 - 144 mmol/L    Potassium 4.6 3.4 - 5.3 mmol/L    Chloride 112 (H) 94 - 109 mmol/L    Carbon Dioxide 23 20 - 32 mmol/L    Anion Gap 3 3 - 14 mmol/L    Glucose 153 (H) 70 - 99 mg/dL    Urea Nitrogen 14 7 - 30 mg/dL    Creatinine 0.80 0.52 - 1.04 mg/dL    GFR Estimate 80 >60 mL/min/[1.73_m2]    GFR Estimate If Black >90 >60 mL/min/[1.73_m2]    Calcium 9.0 8.5 - 10.1 mg/dL    Bilirubin Total 0.3 0.2 - 1.3 mg/dL    Albumin 3.2 (L) 3.4 - 5.0 g/dL    Protein Total 7.5 6.8 - 8.8 g/dL    Alkaline Phosphatase 104 40 - 150 U/L    ALT 29 0 - 50 U/L    AST 27 0 - 45 U/L     Medications   cephALEXin (KEFLEX) capsule 500 mg (500 mg Oral Given 6/4/21 2101)        Assessments & Plan (with Medical Decision Making)   Patient presents for evaluation of suprapubic pain, urinary frequency, dysuria for the past 3 days.    Differential diagnosis includes UTI/cystitis, pyelonephritis.  Unlikely urolithiasis due to lack of lateralizing symptoms or hematuria.  Unlikely vaginal infection due to lack of vaginal discharge, pelvic pain.    On arrival, patient has normal vital signs patient overall appears well and nontoxic.  On exam, there is mild tenderness in the  suprapubic area.  Abdomen is otherwise soft.  No CVA tenderness.    Plan for CBC, CMP, urinalysis, urine pregnancy test.  Will obtain blood cultures given report of recent bacteremia from UTI.    Urinalysis consistent with mild UTI.  No CVA tenderness to suggest pyelonephritis.  No hematuria to suggest ureterolithiasis.Normal white count and vital signs.  Sepsis/bacteremia unlikely.    Symptoms likely consistent with mild/developing UTI.  First dose of Keflex given in the ED.  Discharged with prescription for 5 days.  Will follow up with PCP and return to ED with new or worsening symptoms.      I have reviewed the nursing notes. I have reviewed the findings, diagnosis, plan and need for follow up with the patient.    Discharge Medication List as of 6/4/2021  8:51 PM      START taking these medications    Details   cephALEXin (KEFLEX) 500 MG capsule Take 1 capsule (500 mg) by mouth 2 times daily for 5 days, Disp-10 capsule, R-0, Local Print             Final diagnoses:   Acute cystitis with hematuria       --  Travis Glover DO  [facility] EMERGENCY DEPARTMENT  6/4/2021     Travis Glover DO  06/07/21 [id number]

## 2021-06-05 NOTE — DISCHARGE INSTRUCTIONS
Your work-up in the emergency department did not reveal any signs of serious bacterial infection.  You were discharged on an antibiotic (Keflex) for your urinary tract infection symptoms.  Make sure to take this as prescribed and finish the entire prescription.  Follow-up with your doctor in 1 week for reassessment and return to the ED with any new or worsening symptoms.

## 2021-06-07 ENCOUNTER — TELEPHONE (OUTPATIENT)
Dept: FAMILY MEDICINE | Facility: CLINIC | Age: 61
End: 2021-06-07

## 2021-06-07 NOTE — TELEPHONE ENCOUNTER
Spoke with pt, she set up an appointment with a provider in clinic this week, she is assuring adequate fluids    Richelle Tucker RN   [facility]

## 2021-06-07 NOTE — TELEPHONE ENCOUNTER
Reason for Call:  Other appointment    Detailed comments: Patient went to the ER for a UTI. They told her to do a follow up with her PCP within  a few days of being seen. No open clinic appointments and she's wondering if Dr. Haas can make time for her.    Phone Number Patient can be reached at: Home number on file [phone] (home)    Best Time: Anytime    Can we leave a detailed message on this number? YES    Call taken on 6/7/2021 at 10:19 AM by Tatyana Naylor

## 2021-06-08 ENCOUNTER — OFFICE VISIT (OUTPATIENT)
Dept: PALLIATIVE MEDICINE | Facility: CLINIC | Age: 61
End: 2021-06-08
Payer: COMMERCIAL

## 2021-06-08 VITALS
DIASTOLIC BLOOD PRESSURE: 80 MMHG | HEART RATE: 86 BPM | OXYGEN SATURATION: 96 % | SYSTOLIC BLOOD PRESSURE: 136 MMHG | WEIGHT: 221.8 LBS | BODY MASS INDEX: 43.32 KG/M2

## 2021-06-08 DIAGNOSIS — G89.4 CHRONIC PAIN SYNDROME: ICD-10-CM

## 2021-06-08 DIAGNOSIS — M79.7 FIBROMYALGIA: Primary | ICD-10-CM

## 2021-06-08 DIAGNOSIS — M53.3 SI (SACROILIAC) JOINT DYSFUNCTION: ICD-10-CM

## 2021-06-08 PROCEDURE — 99214 OFFICE O/P EST MOD 30 MIN: CPT | Performed by: PSYCHIATRY & NEUROLOGY

## 2021-06-08 RX ORDER — TOPIRAMATE 200 MG/1
200 TABLET, FILM COATED ORAL 2 TIMES DAILY
Qty: 60 TABLET | Refills: 6 | Status: SHIPPED | OUTPATIENT
Start: 2021-06-08 | End: 2021-10-19

## 2021-06-08 ASSESSMENT — PAIN SCALES - GENERAL: PAINLEVEL: SEVERE PAIN (7)

## 2021-06-08 NOTE — PROGRESS NOTES
[facility]    Date of visit: 6/8/2021    Chief complaint:   Chief Complaint   Patient presents with     Pain      Interval history:  Nena Tang was last seen by me on 3/30/2021    Recommendations/plan at the last visit included:  1. Physical Therapy: continue HEP  2. Pain Psychologist to address issues of relaxation, behavioral change, coping style, and other factors important to improvement: patient agreeable to try to participate with pain psych.- yes, has been following with Dr. Kohler pain psychologist and Dr. Brothers psychiatrist for depression  3. Diagnostic Studies: none  4. Urine toxicology screen: none   5. Medication Management: Titration of topamax provided.  Goal is 150 twice daily  6. Further procedures recommended:  Referral placed for bilateral SI joint injection and bilateral trochanteric bursa injection  7. Other treatments: none  8. Recommendations/follow-up for PCP:  none  9. Follow up: 6- weeks virtual    Since her last visit, Nena Tang reports  - Status post Right SIJ and Right GTB injection on 4/5/2021  Some good relief to back pain and minimal to posterior hip pain  - Recent hospital admission for systemic UTI on 5/8 and required IV antibiotics, prior to that she fell twice but did not sustain any injuries. The falls were due to unsteadiness gait secondary to infection  - Upon discharge from [facility], she did not continue PO antibiotics and patient now states that UTI has returned. She is on PO antibiotics and has a follow-up with her primary care physician this Thursday 4/10/2021  - Otherwise, she states that her function improved following the injections and Topamax medication. No side effects from medication  - Continues to have home nurse to assist with medication management  - Denies any new weaknesses, bowel or bladder incontinence    Pain scores:  Severe Pain (7)     Current pain medications include:  - Gabapentin 300 mg at bedtime -  sedation  so concerned about adding in daytime doses.  - Topamax up titrated to 150 mg BID - no side effects. helpful  - Diclofenac gel QID    Previous medication treatments included:  - Tylenol 650 mg prn - no benefit  - Diclofenac gel - does not take   - Flexeril - no benefit  - Robaxin (methocarbamol) 1000mg dose- not helpful    Other treatments have included:  Nena Tang has not been seen at a pain clinic in the past.    PT: yes, last session 6/2019 outpatient and most recent home PT a couple of months ago 3-4 sessions  Pain Psych: yes   Acupuncture: none  Chiropractor: none  TENs Unit: none  Injections:    - shoulder injection does not remember when - no benefit   - Right SIJ and Right GTB injection on 4/5/2021    Side Effects: none    Medications:  Current Outpatient Medications   Medication Sig Dispense Refill     acetaminophen (TYLENOL) 650 MG CR tablet Take 1 tablet (650 mg) by mouth every 8 hours as needed for mild pain or fever (Patient not taking: Reported on 5/18/2021) 120 tablet 1     alcohol swab prep pads Use to swab area of injection/rodolfo as directed. 100 each 3     amantadine (SYMMETREL) 100 MG capsule Take 1 capsule (100 mg)  in the morning and 2 capsules (200 mg) in the evening. 60 capsule 3     aspirin (ASA) 81 MG EC tablet TAKE 1 TABLET (81MG) BY MOUTH DAILY 90 tablet 3     atorvastatin (LIPITOR) 80 MG tablet TAKE 1 TABLET (80MG) BY MOUTH DAILY 30 tablet 11     benzonatate (TESSALON) 100 MG capsule Take 1 capsule (100 mg) by mouth 3 times daily as needed for cough 90 capsule 1     blood glucose (NO BRAND SPECIFIED) test strip Use to test blood sugar 4 times daily or as directed. 100 strip 11     cephALEXin (KEFLEX) 500 MG capsule Take 1 capsule (500 mg) by mouth 2 times daily for 5 days 10 capsule 0     clonazePAM (KLONOPIN) 0.5 MG tablet Take 1 tablet (0.5 mg) by mouth At Bedtime 30 tablet 0     Continuous Blood Gluc Sensor (FREESTYLE LEBRON 14 DAY SENSOR) MISC 1 Device every 14 days Change  sensor as directed every 14 days 2 each 11     diclofenac (VOLTAREN) 1 % topical gel Apply 4 g topically 4 times daily 350 g 1     escitalopram (LEXAPRO) 10 MG tablet Take 1 tablet (10 mg) by mouth daily 30 tablet 3     famotidine (PEPCID) 20 MG tablet Take 1 tablet (20 mg) by mouth daily 90 tablet 0     fluticasone-salmeterol (ADVAIR) 250-50 MCG/DOSE inhaler Inhale 1 puff into the lungs every 12 hours as needed       gabapentin (NEURONTIN) 300 MG capsule TAKE 1 CAPSULE (300MG) BY MOUTH AT BEDTIME 30 capsule 3     hydrOXYzine (VISTARIL) 25 MG capsule Take 1 capsule (25 mg) by mouth every 4 hours as needed for anxiety 60 capsule 3     insulin aspart (NOVOLOG FLEXPEN) 100 UNIT/ML pen Inject 6 Units Subcutaneous 3 times daily (with meals) 12 mL 1     insulin glargine (LANTUS PEN) 100 UNIT/ML pen Inject 35 Units Subcutaneous every morning 12 mL 1     insulin pen needle (NOVOFINE 30) 30G X 8 MM miscellaneous USE 4 DAILY OR AS DIRECTED 400 each 1     ipratropium - albuterol 0.5 mg/2.5 mg/3 mL (DUONEB) 0.5-2.5 (3) MG/3ML neb solution Take 1 vial (3 mLs) by nebulization every 6 hours as needed for shortness of breath / dyspnea or wheezing 30 vial 0     losartan (COZAAR) 100 MG tablet TAKE 1 TABLET (100MG) BY MOUTH DAILY 30 tablet 5     metoprolol succinate ER (TOPROL-XL) 25 MG 24 hr tablet Take 2 tablets (50 mg) by mouth daily 180 tablet 3     nystatin (MYCOSTATIN) 474225 UNIT/GM external cream Apply topically 2 times daily 30 g 3     nystatin (MYCOSTATIN) 513069 UNIT/GM external powder Apply topically 2 times daily as needed 45 g 1     order for DME Equipment being ordered: Depends. 30 each 4     order for DME Equipment being ordered: CPAP Supplies. 1 each 0     paliperidone ER (INVEGA) 9 MG 24 hr tablet TAKE 1 TABLET BY MOUTH AT BEDTIME 30 tablet 2     senna-docusate (SENOKOT-S/PERICOLACE) 8.6-50 MG tablet Take 1 tablet by mouth 2 times daily as needed for constipation 60 tablet 10     thin (NO BRAND SPECIFIED) lancets  Use with lanceting device. To accompany: Blood Glucose Monitor Brands: per insurance. 100 each 6     topiramate (TOPAMAX) 50 MG tablet Take 100mg (2 tab) by mouth in the am and 150mg (3 tabs) in the pm x 1 week, then increase to 150mg (3 tabs) twice daily. 180 tablet 3     traZODone (DESYREL) 100 MG tablet Take 1 tablet (100 mg) by mouth nightly as needed for sleep 30 tablet 3     TRULICITY 1.5 MG/0.5ML pen Inject 1.5 mg Subcutaneous once a week Every Friday 4 mL 3     zinc oxide (DESITIN) 40 % external ointment Apply topically as needed for dry skin or irritation 56 g 0       Medical History: any changes in medical history since they were last seen? No    Physical Exam:  Vitals:    03/30/21 1544   BP: 122/76   Pulse: 94   SpO2: 98%     Constitutional: Alert, no distress. Friend at bedside  Head: normocephalic. Atraumatic.   Respiratory: No increased work of breathing  Gastrointestinal:  soft   : deferred  Skin: warm  Appearance: normal affect    Musculoskeletal exam:  Gait/Station/Posture:  upright, nonantalgic  Cervical spine: Limited range of motion    Lumbar spine: no pain with lumbar extension, lateral flexion and rotation    Myofascial tenderness: Generalized tenderness to palpation throughout the lumbar paraspinal musculature     SIJ: Significant tenderness to palpation to PSIS bilaterally L>R, positive Samantha finger sign.  Unable to assess other SI joint exams due to patient ability    Hips: Non tender to palpation greater trochanters bilaterally    CN:  Cranial nerves 2-12 are grossly normal  Motor:    Hip flexors  R: 4/5  L: 4/5   Knee extensors R: 5/5  L: 5/5   Dorsiflexion  R: 5/5  L: 5/5   Plantarflexion  R: 5/5  L: 5/5    Reflexes: 1+ patella and achilles     Sensory:  (upper and lower extremities):   Light touch: normal    Allodynia: absent     Assessment:   1.  Widespread pain, she does meet 2010 fibromyalgia diagnostic criteria. Patient continues to endorse generalized tenderness, soreness and  pain to low back, upper and lower extremities and shoulders. Currently on gabapentin, however unable to tolerate sedation SE and therefore only taking it at night. Would benefit from something less sedating.  2. Chronic low back pain- positive kari finger and severe tenderness to palpation to PSIS point more to SI joint pathology.  She also has significant tenderness to palpation to bilateral trochanteric bursa  3. Deconditioning -she has lost some weight, most likely due to current topiramate use.  However, patient unable to perform minimal tasks due to poor exercise tolerance      Plan:  1. Physical Therapy: continue HEP  2. Pain Psychologist to address issues of relaxation, behavioral change, coping style, and other factors important to improvement: patient agreeable to try to participate with pain psych.- yes, has been following with Dr. Kohler pain psychologist and Dr. Brothers psychiatrist for depression  3. Diagnostic Studies: none  4. Urine toxicology screen: none   5. Medication Management: Titration of topamax provided.  Goal is 200mg twice daily  6. Further procedures recommended: right SI joint injection after infection  7. Other treatments: none  8. Recommendations/follow-up for PCP:  none  9. Follow up: 6-8 weeks     30 minutes spent on the date of encounter doing chart review, history, and exam documentation and further activities as noted above.    Shell Paz MD  [facility]       Armando Fu MD pain fellow

## 2021-06-08 NOTE — PATIENT INSTRUCTIONS
1. When you are feeling better and the urine has cleared up, you can call to schedule another SI joint injection.  Call [phone]. I will have the order in, waiting for you to call and schedule.    2. We are going to go up on the dose of topamax as it helps you.    I believe you are on topamax 150mg (3 of the 50mg tabs) twice daily.  If this is not the case, please call our nurse at [phone].    Plan:  Week 1: 150mg in the am and 200mg in the pm  Week 2: 200mg twice daily.    150mg= 3 of the 50mg tabs  200mg= 4 of the 50mg tabs OR 1 of the 200mg tabs.  * I am sending in a new script for 200mg tabs.    3. Follow up in 6-8 weeks.    ----------------------------------------------------------------  Clinic Number:  [phone]     Call with any questions about your care and for scheduling assistance.     Calls are returned Monday through Friday between 8 AM and 4:30 PM. We usually get back to you within 2 business days depending on the issue/request.    If we are prescribing your medications:    For opioid medication refills, call the clinic or send a [employer] message 7 days in advance.  Please include:    Name of requested medication    Name of the pharmacy.    For non-opioid medications, call your pharmacy directly to request a refill. Please allow 3-4 days to be processed.     Per [place] State Law:    All controlled substance prescriptions must be filled within 30 days of being written.      For those controlled substances allowing refills, pickup must occur within 30 days of last fill.      We believe regular attendance is key to your success in our program!      Any time you are unable to keep your appointment we ask that you call us at least 24 hours in advance to cancel.This will allow us to offer the appointment time to another patient.     Multiple missed appointments may lead to dismissal from the clinic.

## 2021-06-10 ENCOUNTER — OFFICE VISIT (OUTPATIENT)
Dept: FAMILY MEDICINE | Facility: CLINIC | Age: 61
End: 2021-06-10
Payer: COMMERCIAL

## 2021-06-10 VITALS
OXYGEN SATURATION: 98 % | TEMPERATURE: 97.1 F | DIASTOLIC BLOOD PRESSURE: 82 MMHG | SYSTOLIC BLOOD PRESSURE: 122 MMHG | BODY MASS INDEX: 42.77 KG/M2 | WEIGHT: 219 LBS

## 2021-06-10 DIAGNOSIS — Z79.4 TYPE 2 DIABETES MELLITUS WITH STAGE 3A CHRONIC KIDNEY DISEASE, WITH LONG-TERM CURRENT USE OF INSULIN (H): ICD-10-CM

## 2021-06-10 DIAGNOSIS — R30.0 DYSURIA: Primary | ICD-10-CM

## 2021-06-10 DIAGNOSIS — N18.31 TYPE 2 DIABETES MELLITUS WITH STAGE 3A CHRONIC KIDNEY DISEASE, WITH LONG-TERM CURRENT USE OF INSULIN (H): ICD-10-CM

## 2021-06-10 DIAGNOSIS — R35.0 INCREASED FREQUENCY OF URINATION: ICD-10-CM

## 2021-06-10 DIAGNOSIS — E11.22 TYPE 2 DIABETES MELLITUS WITH STAGE 3A CHRONIC KIDNEY DISEASE, WITH LONG-TERM CURRENT USE OF INSULIN (H): ICD-10-CM

## 2021-06-10 DIAGNOSIS — R30.0 DYSURIA: ICD-10-CM

## 2021-06-10 DIAGNOSIS — N39.0 URINARY TRACT INFECTION: ICD-10-CM

## 2021-06-10 LAB
ALBUMIN UR-MCNC: NEGATIVE MG/DL
APPEARANCE UR: CLEAR
BILIRUB UR QL STRIP: NEGATIVE
COLOR UR AUTO: YELLOW
GLUCOSE UR STRIP-MCNC: >=1000 MG/DL
HGB UR QL STRIP: NEGATIVE
KETONES UR STRIP-MCNC: NEGATIVE MG/DL
LEUKOCYTE ESTERASE UR QL STRIP: NEGATIVE
NITRATE UR QL: NEGATIVE
PH UR STRIP: 6.5 PH (ref 5–7)
SOURCE: ABNORMAL
SP GR UR STRIP: 1.01 (ref 1–1.03)
UROBILINOGEN UR STRIP-ACNC: 0.2 EU/DL (ref 0.2–1)

## 2021-06-10 PROCEDURE — 81003 URINALYSIS AUTO W/O SCOPE: CPT | Performed by: FAMILY MEDICINE

## 2021-06-10 PROCEDURE — 87086 URINE CULTURE/COLONY COUNT: CPT | Performed by: FAMILY MEDICINE

## 2021-06-10 PROCEDURE — 99214 OFFICE O/P EST MOD 30 MIN: CPT | Performed by: FAMILY MEDICINE

## 2021-06-10 NOTE — PROGRESS NOTES
Assessment & Plan     Dysuria  Getting better, was sen in ED and told to recheck   no fever, chills or flank pain  declined STI testing  - Urine Culture Aerobic Bacterial; Future    Increased frequency of urination  likely due poorly controlled DIABETES MELLITUS   Follow up with PCP   - *UA reflex to Microscopic and Culture ([facility]       Type 2 DIABETES MELLITUS poorly controleed    Review of external notes as documented elsewhere in note  8 minutes spent on the date of the encounter doing review of outside records        Regular exercise  See Patient Instructions    No follow-ups on file.    Siva Junior MD  [facility] BLAS Barrett is a 61 year old who presents for the following health issues     HPI     ED/UC Followup:    Facility:  [facility] Emergency [facility]  Date of visit: 6/4/2021  Reason for visit: UTI  Current Status: Pt. States that she feels okay but still goes back & forth to the restroom. Is concerned of getting a UTI a 2nd time.        Diabetes Follow-up      How often are you checking your blood sugar? Not at all    What concerns do you have today about your diabetes? Other: freq urination     Do you have any of these symptoms? (Select all that apply)  Numbness in feet    Have you had a diabetic eye exam in the last 12 months? No        BP Readings from Last 2 Encounters:   06/10/21 122/82   06/08/21 136/80     Hemoglobin A1C (%)   Date Value   12/01/2020 9.1 (H)   09/15/2020 9.7 (H)     LDL Cholesterol Calculated (mg/dL)   Date Value   12/01/2020 141 (H)   12/22/2019 86                  Review of Systems   Constitutional, HEENT, cardiovascular, pulmonary, gi and gu systems are negative, except as otherwise noted.      Objective    /82   Temp 97.1  F (36.2  C) (Temporal)   Wt 99.3 kg (219 lb)   LMP 01/06/2015 (Exact Date)   SpO2 98%   BMI 42.77 kg/m    Body mass index is 42.77 kg/m .  Physical Exam    GENERAL: alert, over weight and fatigued  RESP: lungs clear to auscultation - no rales, rhonchi or wheezes  CV: regular rate and rhythm, normal S1 S2, no S3 or S4, no murmur, click or rub, no peripheral edema and peripheral pulses strong  ABDOMEN: soft, nontender, no hepatosplenomegaly, no masses and bowel sounds normal  BACK: no CVA tenderness, no paralumbar tenderness  PSYCH: inattentive, tangential and appearance well groomed    Orders Only on 06/10/2021   Component Date Value Ref Range Status     Color Urine 06/10/2021 Yellow   Final     Appearance Urine 06/10/2021 Clear   Final     Glucose Urine 06/10/2021 >=1000* NEG^Negative mg/dL Final     Bilirubin Urine 06/10/2021 Negative  NEG^Negative Final     Ketones Urine 06/10/2021 Negative  NEG^Negative mg/dL Final     Specific Gravity Urine 06/10/2021 1.015  1.003 - 1.035 Final     Blood Urine 06/10/2021 Negative  NEG^Negative Final     pH Urine 06/10/2021 6.5  5.0 - 7.0 pH Final     Protein Albumin Urine 06/10/2021 Negative  NEG^Negative mg/dL Final     Urobilinogen Urine 06/10/2021 0.2  0.2 - 1.0 EU/dL Final     Nitrite Urine 06/10/2021 Negative  NEG^Negative Final     Leukocyte Esterase Urine 06/10/2021 Negative  NEG^Negative Final     Source 06/10/2021 Midstream Urine   Final     Results for orders placed or performed in visit on 06/10/21   *UA reflex to Microscopic and Culture ([facility] (except [place] and [place])     Status: Abnormal    Specimen: Midstream Urine   Result Value Ref Range    Color Urine Yellow     Appearance Urine Clear     Glucose Urine >=1000 (A) NEG^Negative mg/dL    Bilirubin Urine Negative NEG^Negative    Ketones Urine Negative NEG^Negative mg/dL    Specific Gravity Urine 1.015 1.003 - 1.035    Blood Urine Negative NEG^Negative    pH Urine 6.5 5.0 - 7.0 pH    Protein Albumin Urine Negative NEG^Negative mg/dL    Urobilinogen Urine 0.2 0.2 - 1.0 EU/dL    Nitrite Urine Negative NEG^Negative    Leukocyte Esterase Urine  Negative NEG^Negative    Source Midstream Urine

## 2021-06-11 ENCOUNTER — TELEPHONE (OUTPATIENT)
Dept: FAMILY MEDICINE | Facility: CLINIC | Age: 61
End: 2021-06-11

## 2021-06-11 LAB
BACTERIA SPEC CULT: NORMAL
Lab: NORMAL
SPECIMEN SOURCE: NORMAL

## 2021-06-11 NOTE — TELEPHONE ENCOUNTER
[facility] from [facility] called.  She is at patient's home now setting up medications.    Asking if patient is to be on both Lexapro and Sertraline.  There is a little confusion about this.    Needs clarification ASAANY Moscoso ( [phone]).    Thank you,  Suzy Proctor RN

## 2021-06-15 ENCOUNTER — HOSPITAL ENCOUNTER (EMERGENCY)
Facility: CLINIC | Age: 61
Discharge: HOME | End: 2021-06-15
Attending: EMERGENCY MEDICINE | Admitting: EMERGENCY MEDICINE
Payer: COMMERCIAL

## 2021-06-15 ENCOUNTER — TELEPHONE (OUTPATIENT)
Dept: FAMILY MEDICINE | Facility: CLINIC | Age: 61
End: 2021-06-15

## 2021-06-15 ENCOUNTER — APPOINTMENT (OUTPATIENT)
Dept: CT IMAGING | Facility: CLINIC | Age: 61
End: 2021-06-15
Attending: EMERGENCY MEDICINE
Payer: COMMERCIAL

## 2021-06-15 ENCOUNTER — OFFICE VISIT (OUTPATIENT)
Dept: PALLIATIVE MEDICINE | Facility: CLINIC | Age: 61
End: 2021-06-15
Payer: COMMERCIAL

## 2021-06-15 VITALS
DIASTOLIC BLOOD PRESSURE: 93 MMHG | OXYGEN SATURATION: 92 % | HEART RATE: 93 BPM | RESPIRATION RATE: 20 BRPM | TEMPERATURE: 98.2 F | SYSTOLIC BLOOD PRESSURE: 166 MMHG

## 2021-06-15 VITALS
DIASTOLIC BLOOD PRESSURE: 69 MMHG | TEMPERATURE: 97.8 F | HEART RATE: 98 BPM | OXYGEN SATURATION: 93 % | SYSTOLIC BLOOD PRESSURE: 158 MMHG | RESPIRATION RATE: 16 BRPM

## 2021-06-15 DIAGNOSIS — N39.0 URINARY TRACT INFECTION WITHOUT HEMATURIA, SITE UNSPECIFIED: ICD-10-CM

## 2021-06-15 DIAGNOSIS — G89.29 CHRONIC BILATERAL LOW BACK PAIN WITHOUT SCIATICA: ICD-10-CM

## 2021-06-15 DIAGNOSIS — R42 DIZZINESS: ICD-10-CM

## 2021-06-15 DIAGNOSIS — N39.0 URINARY TRACT INFECTION WITH HEMATURIA, SITE UNSPECIFIED: ICD-10-CM

## 2021-06-15 DIAGNOSIS — R11.0 NAUSEA: Primary | ICD-10-CM

## 2021-06-15 DIAGNOSIS — G89.4 CHRONIC PAIN SYNDROME: Primary | ICD-10-CM

## 2021-06-15 DIAGNOSIS — G89.29 CHRONIC RIGHT-SIDED LOW BACK PAIN WITHOUT SCIATICA: ICD-10-CM

## 2021-06-15 DIAGNOSIS — F25.1 SCHIZOAFFECTIVE DISORDER, DEPRESSIVE TYPE (H): ICD-10-CM

## 2021-06-15 DIAGNOSIS — R11.0 NAUSEA: ICD-10-CM

## 2021-06-15 DIAGNOSIS — R10.12 ABDOMINAL PAIN, LEFT UPPER QUADRANT: ICD-10-CM

## 2021-06-15 DIAGNOSIS — M54.50 CHRONIC BILATERAL LOW BACK PAIN WITHOUT SCIATICA: ICD-10-CM

## 2021-06-15 DIAGNOSIS — R31.9 URINARY TRACT INFECTION WITH HEMATURIA, SITE UNSPECIFIED: ICD-10-CM

## 2021-06-15 DIAGNOSIS — M54.50 CHRONIC RIGHT-SIDED LOW BACK PAIN WITHOUT SCIATICA: ICD-10-CM

## 2021-06-15 DIAGNOSIS — M79.7 FIBROMYALGIA: ICD-10-CM

## 2021-06-15 LAB
ALBUMIN SERPL-MCNC: 3.3 G/DL (ref 3.4–5)
ALBUMIN UR-MCNC: 10 MG/DL
ALP SERPL-CCNC: 99 U/L (ref 40–150)
ALT SERPL W P-5'-P-CCNC: 23 U/L (ref 0–50)
ANION GAP SERPL CALCULATED.3IONS-SCNC: 6 MMOL/L (ref 3–14)
ANION GAP SERPL CALCULATED.3IONS-SCNC: 7 MMOL/L (ref 3–14)
APPEARANCE UR: CLEAR
AST SERPL W P-5'-P-CCNC: 12 U/L (ref 0–45)
BASOPHILS # BLD AUTO: 0 10E9/L (ref 0–0.2)
BASOPHILS # BLD AUTO: 0 10E9/L (ref 0–0.2)
BASOPHILS NFR BLD AUTO: 0.6 %
BASOPHILS NFR BLD AUTO: 0.6 %
BILIRUB SERPL-MCNC: 0.3 MG/DL (ref 0.2–1.3)
BILIRUB UR QL STRIP: NEGATIVE
BUN SERPL-MCNC: 6 MG/DL (ref 7–30)
BUN SERPL-MCNC: 8 MG/DL (ref 7–30)
CALCIUM SERPL-MCNC: 8.2 MG/DL (ref 8.5–10.1)
CALCIUM SERPL-MCNC: 9.1 MG/DL (ref 8.5–10.1)
CHLORIDE SERPL-SCNC: 109 MMOL/L (ref 94–109)
CHLORIDE SERPL-SCNC: 112 MMOL/L (ref 94–109)
CO2 SERPL-SCNC: 20 MMOL/L (ref 20–32)
CO2 SERPL-SCNC: 22 MMOL/L (ref 20–32)
COLOR UR AUTO: ABNORMAL
CREAT SERPL-MCNC: 0.78 MG/DL (ref 0.52–1.04)
CREAT SERPL-MCNC: 0.8 MG/DL (ref 0.52–1.04)
DIFFERENTIAL METHOD BLD: ABNORMAL
DIFFERENTIAL METHOD BLD: ABNORMAL
EOSINOPHIL # BLD AUTO: 0.1 10E9/L (ref 0–0.7)
EOSINOPHIL # BLD AUTO: 0.1 10E9/L (ref 0–0.7)
EOSINOPHIL NFR BLD AUTO: 1.9 %
EOSINOPHIL NFR BLD AUTO: 1.9 %
ERYTHROCYTE [DISTWIDTH] IN BLOOD BY AUTOMATED COUNT: 13.1 % (ref 10–15)
ERYTHROCYTE [DISTWIDTH] IN BLOOD BY AUTOMATED COUNT: 13.3 % (ref 10–15)
GFR SERPL CREATININE-BSD FRML MDRD: 79 ML/MIN/{1.73_M2}
GFR SERPL CREATININE-BSD FRML MDRD: 83 ML/MIN/{1.73_M2}
GLUCOSE BLDC GLUCOMTR-MCNC: 199 MG/DL (ref 70–99)
GLUCOSE SERPL-MCNC: 149 MG/DL (ref 70–99)
GLUCOSE SERPL-MCNC: 215 MG/DL (ref 70–99)
GLUCOSE UR STRIP-MCNC: 1000 MG/DL
HCT VFR BLD AUTO: 34.8 % (ref 35–47)
HCT VFR BLD AUTO: 35.9 % (ref 35–47)
HGB BLD-MCNC: 11.1 G/DL (ref 11.7–15.7)
HGB BLD-MCNC: 11.5 G/DL (ref 11.7–15.7)
HGB UR QL STRIP: ABNORMAL
IMM GRANULOCYTES # BLD: 0 10E9/L (ref 0–0.4)
IMM GRANULOCYTES # BLD: 0.1 10E9/L (ref 0–0.4)
IMM GRANULOCYTES NFR BLD: 0.6 %
IMM GRANULOCYTES NFR BLD: 0.8 %
KETONES UR STRIP-MCNC: NEGATIVE MG/DL
LEUKOCYTE ESTERASE UR QL STRIP: ABNORMAL
LIPASE SERPL-CCNC: 118 U/L (ref 73–393)
LYMPHOCYTES # BLD AUTO: 1.8 10E9/L (ref 0.8–5.3)
LYMPHOCYTES # BLD AUTO: 2.1 10E9/L (ref 0.8–5.3)
LYMPHOCYTES NFR BLD AUTO: 24.6 %
LYMPHOCYTES NFR BLD AUTO: 29.3 %
MCH RBC QN AUTO: 32 PG (ref 26.5–33)
MCH RBC QN AUTO: 32.1 PG (ref 26.5–33)
MCHC RBC AUTO-ENTMCNC: 31.9 G/DL (ref 31.5–36.5)
MCHC RBC AUTO-ENTMCNC: 32 G/DL (ref 31.5–36.5)
MCV RBC AUTO: 100 FL (ref 78–100)
MCV RBC AUTO: 101 FL (ref 78–100)
MONOCYTES # BLD AUTO: 0.5 10E9/L (ref 0–1.3)
MONOCYTES # BLD AUTO: 0.6 10E9/L (ref 0–1.3)
MONOCYTES NFR BLD AUTO: 7 %
MONOCYTES NFR BLD AUTO: 7.9 %
MUCOUS THREADS #/AREA URNS LPF: PRESENT /LPF
NEUTROPHILS # BLD AUTO: 4.3 10E9/L (ref 1.6–8.3)
NEUTROPHILS # BLD AUTO: 4.7 10E9/L (ref 1.6–8.3)
NEUTROPHILS NFR BLD AUTO: 59.5 %
NEUTROPHILS NFR BLD AUTO: 65.3 %
NITRATE UR QL: NEGATIVE
NRBC # BLD AUTO: 0 10*3/UL
NRBC # BLD AUTO: 0 10*3/UL
NRBC BLD AUTO-RTO: 0 /100
NRBC BLD AUTO-RTO: 0 /100
PH UR STRIP: 6.5 PH (ref 5–7)
PLATELET # BLD AUTO: 216 10E9/L (ref 150–450)
PLATELET # BLD AUTO: 231 10E9/L (ref 150–450)
POTASSIUM SERPL-SCNC: 3.4 MMOL/L (ref 3.4–5.3)
POTASSIUM SERPL-SCNC: 3.5 MMOL/L (ref 3.4–5.3)
PROT SERPL-MCNC: 7.4 G/DL (ref 6.8–8.8)
RBC # BLD AUTO: 3.46 10E12/L (ref 3.8–5.2)
RBC # BLD AUTO: 3.59 10E12/L (ref 3.8–5.2)
RBC #/AREA URNS AUTO: 25 /HPF (ref 0–2)
SODIUM SERPL-SCNC: 137 MMOL/L (ref 133–144)
SODIUM SERPL-SCNC: 139 MMOL/L (ref 133–144)
SOURCE: ABNORMAL
SP GR UR STRIP: 1 (ref 1–1.03)
SQUAMOUS #/AREA URNS AUTO: 6 /HPF (ref 0–1)
TROPONIN I SERPL-MCNC: <0.015 UG/L (ref 0–0.04)
UROBILINOGEN UR STRIP-MCNC: NORMAL MG/DL (ref 0–2)
WBC # BLD AUTO: 7.2 10E9/L (ref 4–11)
WBC # BLD AUTO: 7.3 10E9/L (ref 4–11)
WBC #/AREA URNS AUTO: 12 /HPF (ref 0–5)

## 2021-06-15 PROCEDURE — 97110 THERAPEUTIC EXERCISES: CPT | Mod: GP | Performed by: PHYSICAL THERAPIST

## 2021-06-15 PROCEDURE — 80053 COMPREHEN METABOLIC PANEL: CPT | Performed by: EMERGENCY MEDICINE

## 2021-06-15 PROCEDURE — 250N000011 HC RX IP 250 OP 636: Performed by: EMERGENCY MEDICINE

## 2021-06-15 PROCEDURE — 70450 CT HEAD/BRAIN W/O DYE: CPT

## 2021-06-15 PROCEDURE — 85025 COMPLETE CBC W/AUTO DIFF WBC: CPT | Performed by: EMERGENCY MEDICINE

## 2021-06-15 PROCEDURE — 96375 TX/PRO/DX INJ NEW DRUG ADDON: CPT

## 2021-06-15 PROCEDURE — 80048 BASIC METABOLIC PNL TOTAL CA: CPT | Performed by: EMERGENCY MEDICINE

## 2021-06-15 PROCEDURE — 258N000003 HC RX IP 258 OP 636: Performed by: EMERGENCY MEDICINE

## 2021-06-15 PROCEDURE — 93010 ELECTROCARDIOGRAM REPORT: CPT | Performed by: EMERGENCY MEDICINE

## 2021-06-15 PROCEDURE — 96361 HYDRATE IV INFUSION ADD-ON: CPT

## 2021-06-15 PROCEDURE — 250N000013 HC RX MED GY IP 250 OP 250 PS 637: Performed by: EMERGENCY MEDICINE

## 2021-06-15 PROCEDURE — 36415 COLL VENOUS BLD VENIPUNCTURE: CPT | Performed by: EMERGENCY MEDICINE

## 2021-06-15 PROCEDURE — 83690 ASSAY OF LIPASE: CPT | Performed by: EMERGENCY MEDICINE

## 2021-06-15 PROCEDURE — 87086 URINE CULTURE/COLONY COUNT: CPT | Performed by: EMERGENCY MEDICINE

## 2021-06-15 PROCEDURE — 81001 URINALYSIS AUTO W/SCOPE: CPT | Performed by: EMERGENCY MEDICINE

## 2021-06-15 PROCEDURE — 96365 THER/PROPH/DIAG IV INF INIT: CPT

## 2021-06-15 PROCEDURE — 250N000009 HC RX 250: Performed by: EMERGENCY MEDICINE

## 2021-06-15 PROCEDURE — 84484 ASSAY OF TROPONIN QUANT: CPT | Performed by: EMERGENCY MEDICINE

## 2021-06-15 PROCEDURE — 999N001017 HC STATISTIC GLUCOSE BY METER IP

## 2021-06-15 PROCEDURE — 99285 EMERGENCY DEPT VISIT HI MDM: CPT | Mod: 25 | Performed by: EMERGENCY MEDICINE

## 2021-06-15 PROCEDURE — 74177 CT ABD & PELVIS W/CONTRAST: CPT

## 2021-06-15 PROCEDURE — 97535 SELF CARE MNGMENT TRAINING: CPT | Mod: GP | Performed by: PHYSICAL THERAPIST

## 2021-06-15 PROCEDURE — 99285 EMERGENCY DEPT VISIT HI MDM: CPT | Mod: 25

## 2021-06-15 PROCEDURE — 96374 THER/PROPH/DIAG INJ IV PUSH: CPT | Mod: 59

## 2021-06-15 PROCEDURE — 93005 ELECTROCARDIOGRAM TRACING: CPT

## 2021-06-15 RX ORDER — SUCRALFATE ORAL 1 G/10ML
1 SUSPENSION ORAL ONCE
Status: COMPLETED | OUTPATIENT
Start: 2021-06-15 | End: 2021-06-15

## 2021-06-15 RX ORDER — ONDANSETRON 4 MG/1
4 TABLET, FILM COATED ORAL EVERY 8 HOURS PRN
Qty: 8 TABLET | Refills: 0 | Status: SHIPPED | OUTPATIENT
Start: 2021-06-15 | End: 2021-09-02

## 2021-06-15 RX ORDER — LOSARTAN POTASSIUM 100 MG/1
100 TABLET ORAL DAILY
Status: DISCONTINUED | OUTPATIENT
Start: 2021-06-15 | End: 2021-06-15

## 2021-06-15 RX ORDER — SODIUM CHLORIDE 9 MG/ML
INJECTION, SOLUTION INTRAVENOUS CONTINUOUS
Status: DISCONTINUED | OUTPATIENT
Start: 2021-06-15 | End: 2021-06-15 | Stop reason: HOSPADM

## 2021-06-15 RX ORDER — MORPHINE SULFATE 4 MG/ML
4 INJECTION, SOLUTION INTRAMUSCULAR; INTRAVENOUS
Status: DISCONTINUED | OUTPATIENT
Start: 2021-06-15 | End: 2021-06-15 | Stop reason: HOSPADM

## 2021-06-15 RX ORDER — ONDANSETRON 2 MG/ML
4 INJECTION INTRAMUSCULAR; INTRAVENOUS EVERY 30 MIN PRN
Status: DISCONTINUED | OUTPATIENT
Start: 2021-06-15 | End: 2021-06-15 | Stop reason: HOSPADM

## 2021-06-15 RX ORDER — NITROFURANTOIN 25; 75 MG/1; MG/1
100 CAPSULE ORAL ONCE
Status: COMPLETED | OUTPATIENT
Start: 2021-06-15 | End: 2021-06-15

## 2021-06-15 RX ORDER — CEFTRIAXONE 1 G/1
1 INJECTION, POWDER, FOR SOLUTION INTRAMUSCULAR; INTRAVENOUS ONCE
Status: COMPLETED | OUTPATIENT
Start: 2021-06-15 | End: 2021-06-15

## 2021-06-15 RX ORDER — SERTRALINE HYDROCHLORIDE 100 MG/1
200 TABLET, FILM COATED ORAL DAILY
COMMUNITY
End: 2021-09-02

## 2021-06-15 RX ORDER — IOPAMIDOL 755 MG/ML
500 INJECTION, SOLUTION INTRAVASCULAR ONCE
Status: COMPLETED | OUTPATIENT
Start: 2021-06-15 | End: 2021-06-15

## 2021-06-15 RX ORDER — PANTOPRAZOLE SODIUM 40 MG/1
40 TABLET, DELAYED RELEASE ORAL DAILY
Qty: 30 TABLET | Refills: 0 | Status: SHIPPED | OUTPATIENT
Start: 2021-06-15 | End: 2021-07-06

## 2021-06-15 RX ORDER — SODIUM CHLORIDE 9 MG/ML
INJECTION, SOLUTION INTRAVENOUS CONTINUOUS
Status: DISCONTINUED | OUTPATIENT
Start: 2021-06-15 | End: 2021-06-16 | Stop reason: HOSPADM

## 2021-06-15 RX ORDER — ONDANSETRON 4 MG/1
4 TABLET, ORALLY DISINTEGRATING ORAL EVERY 8 HOURS PRN
Qty: 20 TABLET | Refills: 1 | Status: SHIPPED | OUTPATIENT
Start: 2021-06-15 | End: 2021-06-18

## 2021-06-15 RX ORDER — KETOROLAC TROMETHAMINE 30 MG/ML
30 INJECTION, SOLUTION INTRAMUSCULAR; INTRAVENOUS ONCE
Status: COMPLETED | OUTPATIENT
Start: 2021-06-15 | End: 2021-06-15

## 2021-06-15 RX ORDER — NITROFURANTOIN 25; 75 MG/1; MG/1
100 CAPSULE ORAL 2 TIMES DAILY
Qty: 14 CAPSULE | Refills: 0 | Status: SHIPPED | OUTPATIENT
Start: 2021-06-15 | End: 2021-06-21

## 2021-06-15 RX ADMIN — SODIUM CHLORIDE 1000 ML: 9 INJECTION, SOLUTION INTRAVENOUS at 07:23

## 2021-06-15 RX ADMIN — LIDOCAINE HYDROCHLORIDE 30 ML: 20 SOLUTION ORAL; TOPICAL at 07:23

## 2021-06-15 RX ADMIN — CEFTRIAXONE SODIUM 1 G: 1 INJECTION, POWDER, FOR SOLUTION INTRAMUSCULAR; INTRAVENOUS at 22:24

## 2021-06-15 RX ADMIN — SUCRALFATE ORAL 1 G: 1 SUSPENSION ORAL at 10:45

## 2021-06-15 RX ADMIN — KETOROLAC TROMETHAMINE 30 MG: 30 INJECTION, SOLUTION INTRAMUSCULAR at 21:10

## 2021-06-15 RX ADMIN — SODIUM CHLORIDE 1000 ML: 9 INJECTION, SOLUTION INTRAVENOUS at 21:09

## 2021-06-15 RX ADMIN — SODIUM CHLORIDE: 9 INJECTION, SOLUTION INTRAVENOUS at 22:24

## 2021-06-15 RX ADMIN — SODIUM CHLORIDE: 9 INJECTION, SOLUTION INTRAVENOUS at 10:23

## 2021-06-15 RX ADMIN — NITROFURANTOIN (MONOHYDRATE/MACROCRYSTALS) 100 MG: 75; 25 CAPSULE ORAL at 10:45

## 2021-06-15 RX ADMIN — IOPAMIDOL 100 ML: 755 INJECTION, SOLUTION INTRAVENOUS at 08:10

## 2021-06-15 RX ADMIN — ONDANSETRON 4 MG: 2 INJECTION INTRAMUSCULAR; INTRAVENOUS at 07:22

## 2021-06-15 RX ADMIN — MORPHINE SULFATE 4 MG: 4 INJECTION INTRAVENOUS at 08:25

## 2021-06-15 RX ADMIN — SODIUM CHLORIDE 65 ML: 9 INJECTION, SOLUTION INTRAVENOUS at 08:11

## 2021-06-15 RX ADMIN — PROCHLORPERAZINE EDISYLATE 10 MG: 5 INJECTION INTRAMUSCULAR; INTRAVENOUS at 21:14

## 2021-06-15 ASSESSMENT — ENCOUNTER SYMPTOMS
ABDOMINAL PAIN: 1
DYSURIA: 0
ABDOMINAL PAIN: 1
SHORTNESS OF BREATH: 0
DIARRHEA: 1
CHILLS: 1
FREQUENCY: 0
BLOOD IN STOOL: 0
VOMITING: 0
NAUSEA: 1
HEADACHES: 1
NAUSEA: 1
DIZZINESS: 1

## 2021-06-15 NOTE — TELEPHONE ENCOUNTER
Pt presents to the clinic for walk-in triage.    Pt has had several ED visits over the last few weeks with UTI symptoms, abdominal pain, nausea, and diarrhea. Pt reports that she has also been experiencing dizziness and blurry vision that comes and goes, unclear if this was addressed during her ED visits. Had an ED visit this morning 6/15.    Pt reports that she is still experiencing the abdominal pain and nausea, and the dizziness/blurry vision comes and goes. Pt describes dizziness as feeling like the room is spinning around her.    Performed neuro assessment - horizontal nystagmus present, pt experiencing dizziness with turning head side to side. No focal deficits noted.    Vitals WNL except /93.    Huddled with Kelle Rae PA-C regarding pt's symptoms, recommended pt follow-up with [facility] as soon as she can and sent rx for zofran to help with pt's nausea. Pt had course of Macrobid prescribed by ED provider today, pt reports she will receive it tonight and start taking.      [facility]ena - Pt is scheduled for 60 min clinic visit with you on 6/29, do you want to see her sooner? Offered pt virtual with you on Mon 6/21, pt declined because she wants in person visit for ED f/u. Would you be ok with pt doing clinic visit during virtual visit slot?    Crissy Pulido RN  [facility]

## 2021-06-15 NOTE — ED TRIAGE NOTES
Just discharged from hospital this morning for abdominal cramping and UTI. Went to physical therapy had to stop due to dizziness, when checked BP was elevated. Says she took all her medications this morning but it uncertain of names or doses. Says she does take a medication for BP.

## 2021-06-15 NOTE — ED NOTES
Pt still c/o dizziness while lying down. Hx of chronic arms and legs pain. Stating unable to eat today d/t abdominal pain

## 2021-06-15 NOTE — ED TRIAGE NOTES
Pt was admited for sepsis a few weeks ago from a UTI. Was seen again about 1 week ago and given antibiotics for another UTI. Now c/o upset stomach, nausea, diarrhea

## 2021-06-15 NOTE — DISCHARGE INSTRUCTIONS
Please return to the emergency department as needed for new or worsening symptoms including severe uncontrolled pain, vomiting unable to keep anything down, fainting, any other concerning symptoms.      Discharge Instructions  Abdominal Pain    Abdominal pain (belly pain) can be caused by many things. Your evaluation today does not show the exact cause for your pain. Your provider today has decided that it is unlikely your pain is due to a life threatening problem, or a problem requiring surgery or hospital admission. Sometimes those problems cannot be found right away, so it is very important that you follow up as directed.  Sometimes only the changes which occur over time allow the cause of your pain to be found.    Generally, every Emergency Department visit should have a follow-up clinic visit with either a primary or a specialty clinic/provider. Please follow-up as instructed by your emergency provider today. With abdominal pain, we often recommend very close follow-up, such as the following day.    ADULTS:  Return to the Emergency Department right away if:    You get an oral temperature above 102oF or as directed by your provider.  You have blood in your stools. This may be bright red or appear as black, tarry stools.    You keep vomiting (throwing up) or cannot drink liquids.  You see blood when you vomit.   You cannot have a bowel movement or you cannot pass gas.  Your stomach gets bloated or bigger.  Your skin or the whites of your eyes look yellow.  You faint.  You have bloody, frequent or painful urination (peeing).  You have new symptoms or anything that worries you.    CHILDREN:  Return to the Emergency Department right away if your child has any of the above-listed symptoms or the following:    Pushes your hand away or screams/cries when his/her belly is touched.  You notice your child is very fussy or weak.  Your child is very tired and is too tired to eat or drink.  Your child is dehydrated.  Signs  of dehydration can be:  Significant change in the amount of wet diapers/urine.  Your infant or child starts to have dry mouth and lips, or no saliva (spit) or tears.    PREGNANT WOMEN:  Return to the Emergency Department right away if you have any of the above-listed symptoms or the following:    You have bleeding, leaking fluid or passing tissue from the vagina.  You have worse pain or cramping, or pain in your shoulder or back.  You have vomiting that will not stop.  You have a temperature of 100oF or more.  Your baby is not moving as much as usual.  You faint.  You get a bad headache with or without eye problems and abdominal pain.  You have a seizure.  You have unusual discharge from your vagina and abdominal pain.    Abdominal pain is pretty common during pregnancy.  Your pain may or may not be related to your pregnancy. You should follow-up closely with your OB provider so they can evaluate you and your baby.  Until you follow-up with your regular provider, do the following:     Avoid sex and do not put anything in your vagina.  Drink clear fluids.  Only take medications approved by your provider.    MORE INFORMATION:    Appendicitis:  A possible cause of abdominal pain in any person who still has their appendix is acute appendicitis. Appendicitis is often hard to diagnose.  Testing does not always rule out early appendicitis or other causes of abdominal pain. Close follow-up with your provider and re-evaluations may be needed to figure out the reason for your abdominal pain.    Follow-up:  It is very important that you make an appointment with your clinic and go to the appointment.  If you do not follow-up with your primary provider, it may result in missing an important development which could result in permanent injury or disability and/or lasting pain.  If there is any problem keeping your appointment, call your provider or return to the Emergency Department.    Medications:  Take your medications as  "directed by your provider today.  Before using over-the-counter medications, ask your provider and make sure to take the medications as directed.  If you have any questions about medications, ask your provider.    Diet:  Resume your normal diet as much as possible, but do not eat fried, fatty or spicy foods while you have pain.  Do not drink alcohol or have caffeine.  Do not smoke tobacco.    Probiotics: If you have been given an antibiotic, you may want to also take a probiotic pill or eat yogurt with live cultures. Probiotics have \"good bacteria\" to help your intestines stay healthy. Studies have shown that probiotics help prevent diarrhea (loose stools) and other intestine problems (including C. diff infection) when you take antibiotics. You can buy these without a prescription in the pharmacy section of the store.     If you were given a prescription for medicine here today, be sure to read all of the information (including the package insert) that comes with your prescription.  This will include important information about the medicine, its side effects, and any warnings that you need to know about.  The pharmacist who fills the prescription can provide more information and answer questions you may have about the medicine.  If you have questions or concerns that the pharmacist cannot address, please call or return to the Emergency Department.       Remember that you can always come back to the Emergency Department if you are not able to see your regular provider in the amount of time listed above, if you get any new symptoms, or if there is anything that worries you.    "

## 2021-06-15 NOTE — ED PROVIDER NOTES
ED Provider Note  [facility]      History     Chief Complaint   Patient presents with     Hypertension     dizzy, blurry vision, headache, chills     HPI  Nena Tang is a 61 year old female with a PMH of DM2, HTN, HLD, CINDY, CAD, h/o cocaine abuse, h/o heroin abuse, IBS, fibromyalgia, gastroenteritis, uterine cancer, recurrent UTI, sepsis, migraine and schizoaffective disorder who presents to the ED today complaining of hypertension.  Patient states she was diagnosed with a bladder infection a few weeks ago which progressed into sepsis.  She states she was hospitalized for 5 days and was put on antibiotics before her symptoms returned.  She states she was seen earlier today at [facility] ER and was prescribed new antibiotics.  She states she has not eaten or drank for the past 2 days so was given IV fluids.  She states she was at her pain clinic today doing exercises when she felt dizzy.  She states she took her blood pressure which was elevated.  She states she took her blood pressure medications this morning.  Here in the ED patient is complaining of abdominal cramping, dizziness, headache and mild nausea.  She states her headache is a 6/10 on the pain scale.    Patient was recently seen at [facility] ED earlier today complaining of abdominal pain.  She was recently treated with Keflex for UTI and was switched to Macrobid at this visit.    Past Medical History  Past Medical History:   Diagnosis Date     Acute respiratory failure with hypoxia (H) 9/4/2017     CAD (coronary artery disease)     5/2014 cath, nonbostructive stenosis to LAD, RCA.     Chronic low back pain 1/22/2013     Cocaine abuse, in remission (H)      Fecal urgency 3/8/2012     History of heroin abuse (H)      Hyperlipidemia LDL goal <100 10/31/2010     Hypertension 7/29/2013     Illiterate 8/30/2011     Irritable bowel syndrome      Left cataract      Migraine 4/19/2012     Migraine headache 4/22/2013      Moderate major depression (H) 6/8/2011     Noncompliance with medication regimen 6/8/2011     Obesity      CINDY (obstructive sleep apnea) 3/8/2012    uses CPAP     Osteopenia 10/7/2009     Pneumonia of right lower lobe due to infectious organism 9/4/2017     Schizoaffective disorder, depressive type (H) 2/25/2013     Sepsis (H) 8/29/2017     Suicidal intent 10/2/2013     Takotsubo cardiomyopathy      Type 2 diabetes mellitus (H) 8/30/2011     Uterine cancer (H) 1983     Verbal auditory hallucination 10/4/2012     Past Surgical History:   Procedure Laterality Date     C OOPHORECTORMY FOR MALIG, W/BX  1983    UTERINE     CATARACT IOL, RT/LT Bilateral 2017     COLONOSCOPY N/A 3/16/2017    Procedure: COLONOSCOPY;  Surgeon: Traci Gonzalez MD;  Location:  GI     Coronary CTA  5/21/2014     HYSTERECTOMY  1983    uterine cancer yearly pap's per provider.     LAPAROSCOPIC CHOLECYSTECTOMY  2008     PHACOEMULSIFICATION CLEAR CORNEA WITH STANDARD INTRAOCULAR LENS IMPLANT Left 5/5/2017    Procedure: PHACOEMULSIFICATION CLEAR CORNEA WITH STANDARD INTRAOCULAR LENS IMPLANT;  LEFT EYE PHACOEMULSIFICATION CLEAR CORNEA WITH STANDARD INTRAOCULAR LENS IMPLANT ;  Surgeon: Tyra Diaz MD;  Location:  [facility]     PHACOEMULSIFICATION CLEAR CORNEA WITH STANDARD INTRAOCULAR LENS IMPLANT Right 6/30/2017    Procedure: PHACOEMULSIFICATION CLEAR CORNEA WITH STANDARD INTRAOCULAR LENS IMPLANT;  RIGHT EYE PHACOEMULSIFICATION CLEAR CORNEA WITH STANDARD INTRAOCULAR LENS IMPLANT;  Surgeon: Tyra Diaz MD;  Location:  [facility]     RELEASE TRIGGER FINGER  10/11/2012    Left thumb. Procedure: RELEASE TRIGGER FINGER;  LEFT THUMB TRIGGER RELEASE;  Surgeon: Tay Langley MD;  Location:  [facility]     RELEASE TRIGGER FINGER Right 12/26/2016    Procedure: RELEASE TRIGGER FINGER;  Surgeon: Albino Castañeda MD;  Location: [facility] OR     amantadine (SYMMETREL) 100 MG capsule  aspirin (ASA) 81 MG EC tablet  atorvastatin (LIPITOR) 80 MG  tablet  clonazePAM (KLONOPIN) 0.5 MG tablet  ondansetron (ZOFRAN ODT) 4 MG ODT tab  acetaminophen (TYLENOL) 650 MG CR tablet  alcohol swab prep pads  benzonatate (TESSALON) 100 MG capsule  blood glucose (NO BRAND SPECIFIED) test strip  COMPOUNDED NON-CONTROLLED SUBSTANCE (CMPD RX) - PHARMACY TO MIX COMPOUNDED MEDICATION  Continuous Blood Gluc Sensor (FREESTYLE LEBRON 14 DAY SENSOR) MISC  diclofenac (VOLTAREN) 1 % topical gel  escitalopram (LEXAPRO) 10 MG tablet  famotidine (PEPCID) 20 MG tablet  fluticasone-salmeterol (ADVAIR) 250-50 MCG/DOSE inhaler  gabapentin (NEURONTIN) 300 MG capsule  hydrOXYzine (VISTARIL) 25 MG capsule  insulin aspart (NOVOLOG FLEXPEN) 100 UNIT/ML pen  insulin glargine (LANTUS PEN) 100 UNIT/ML pen  insulin pen needle (NOVOFINE 30) 30G X 8 MM miscellaneous  ipratropium - albuterol 0.5 mg/2.5 mg/3 mL (DUONEB) 0.5-2.5 (3) MG/3ML neb solution  losartan (COZAAR) 100 MG tablet  metoprolol succinate ER (TOPROL-XL) 25 MG 24 hr tablet  nitroFURantoin macrocrystal-monohydrate (MACROBID) 100 MG capsule  nystatin (MYCOSTATIN) 734112 UNIT/GM external cream  nystatin (MYCOSTATIN) 685418 UNIT/GM external powder  ondansetron (ZOFRAN) 4 MG tablet  order for DME  order for DME  paliperidone ER (INVEGA) 9 MG 24 hr tablet  pantoprazole (PROTONIX) 40 MG EC tablet  senna-docusate (SENOKOT-S/PERICOLACE) 8.6-50 MG tablet  sertraline (ZOLOFT) 100 MG tablet  thin (NO BRAND SPECIFIED) lancets  topiramate (TOPAMAX) 200 MG tablet  topiramate (TOPAMAX) 50 MG tablet  traZODone (DESYREL) 100 MG tablet  TRULICITY 1.5 MG/0.5ML pen  zinc oxide (DESITIN) 40 % external ointment      Allergies   Allergen Reactions     Imidazole Antifungals Hives     Tolerates diflucan     Ketoprofen Itching     Pruritis to topical     Lisinopril Hives     Metformin Other (See Comments)     Patient hospitalized for lactic acidosis - admitting provider suspectd caused by metformin     Metronidazole Hives     Posaconazole Hives     Tolerates diflucan      Family History  Family History   Problem Relation Age of Onset     Cancer Mother         BLADDER     Respiratory Mother         COPD     Gastrointestinal Disease Mother         CIRRHOSIS OF LI BOLIVAR     Alcohol/Drug Mother      Diabetes Mother      Hypertension Mother      Lipids Mother      C.A.D. Mother      Glaucoma Mother      Alcohol/Drug Sister      Mental Illness Sister      Alcohol/Drug Sister      Psychotic Disorder Sister      Cancer Maternal Grandmother         UNKNOWN TYPE     Cancer Brother         COLON     Cancer - colorectal Brother         IN HIS LATE 30S     Alcohol/Drug Brother          OF HEROIN OVERDOSE AT AGE 22 YRS     Macular Degeneration No family hx of      Social History   Social History     Tobacco Use     Smoking status: Never Smoker     Smokeless tobacco: Never Used   Substance Use Topics     Alcohol use: No     Frequency: Never     Comment: last month     Drug use: No     Comment: history of      Past medical history, past surgical history, medications, allergies, family history, and social history were reviewed with the patient. No additional pertinent items.       Review of Systems   Gastrointestinal: Positive for abdominal pain (cramping) and nausea (mild).   Neurological: Positive for dizziness and headaches.   All other systems reviewed and are negative.    A complete review of systems was performed with pertinent positives and negatives noted in the HPI, and all other systems negative.         Physical Exam   BP: (!) 184/85  Pulse: 100  Temp: 97.8  F (36.6  C)  Resp: 16  SpO2: 98 %  Physical Exam  Constitutional:       General: She is not in acute distress.     Appearance: She is not diaphoretic.   HENT:      Head: Normocephalic.      Mouth/Throat:      Pharynx: No oropharyngeal exudate.   Eyes:      Extraocular Movements: Extraocular movements intact.   Neck:      Musculoskeletal: Neck supple.   Cardiovascular:      Rate and Rhythm: Normal rate and regular rhythm.       Heart sounds: Normal heart sounds.   Pulmonary:      Effort: No respiratory distress.      Breath sounds: Normal breath sounds.   Abdominal:      General: There is no distension.      Palpations: Abdomen is soft.      Tenderness: There is no abdominal tenderness.   Musculoskeletal:         General: No deformity.   Skin:     General: Skin is warm and dry.   Neurological:      Mental Status: She is alert.      Comments: alert   Psychiatric:         Behavior: Behavior normal.         ED Course     6:24 PM  The patient was seen and examined by Kavin Martin DO in Room ED05.     Procedures        Results for orders placed or performed during the hospital encounter of 06/15/21   CT Head w/o Contrast     Status: None    Narrative    EXAM: CT HEAD W/O CONTRAST  LOCATION: [facility]  DATE/TIME: 6/15/2021 9:39 PM    INDICATION: Dizziness, non-specific  COMPARISON: None.  TECHNIQUE: Routine CT Head without IV contrast. Multiplanar reformats. Dose reduction techniques were used.    FINDINGS:  INTRACRANIAL CONTENTS: No intracranial hemorrhage, extraaxial collection, or mass effect.  No CT evidence of acute infarct. Mild presumed chronic small vessel ischemic changes. Mild generalized volume loss. No hydrocephalus.     VISUALIZED ORBITS/SINUSES/MASTOIDS: Borderline proptosis. No paranasal sinus mucosal disease. No middle ear or mastoid effusion.    BONES/SOFT TISSUES: Anterior subluxation temporomandibular joints bilaterally.      Impression    IMPRESSION:  1.  No acute intracranial process.   Glucose by meter     Status: Abnormal   Result Value Ref Range    Glucose 199 (H) 70 - 99 mg/dL   Basic metabolic panel     Status: Abnormal   Result Value Ref Range    Sodium 139 133 - 144 mmol/L    Potassium 3.4 3.4 - 5.3 mmol/L    Chloride 112 (H) 94 - 109 mmol/L    Carbon Dioxide 20 20 - 32 mmol/L    Anion Gap 7 3 - 14 mmol/L    Glucose 149 (H) 70 - 99 mg/dL    Urea Nitrogen 6 (L) 7 - 30 mg/dL    Creatinine 0.78  0.52 - 1.04 mg/dL    GFR Estimate 83 >60 mL/min/[1.73_m2]    GFR Estimate If Black >90 >60 mL/min/[1.73_m2]    Calcium 8.2 (L) 8.5 - 10.1 mg/dL   CBC with platelets differential     Status: Abnormal   Result Value Ref Range    WBC 7.2 4.0 - 11.0 10e9/L    RBC Count 3.46 (L) 3.8 - 5.2 10e12/L    Hemoglobin 11.1 (L) 11.7 - 15.7 g/dL    Hematocrit 34.8 (L) 35.0 - 47.0 %     (H) 78 - 100 fl    MCH 32.1 26.5 - 33.0 pg    MCHC 31.9 31.5 - 36.5 g/dL    RDW 13.1 10.0 - 15.0 %    Platelet Count 216 150 - 450 10e9/L    Diff Method Automated Method     % Neutrophils 59.5 %    % Lymphocytes 29.3 %    % Monocytes 7.9 %    % Eosinophils 1.9 %    % Basophils 0.6 %    % Immature Granulocytes 0.8 %    Nucleated RBCs 0 0 /100    Absolute Neutrophil 4.3 1.6 - 8.3 10e9/L    Absolute Lymphocytes 2.1 0.8 - 5.3 10e9/L    Absolute Monocytes 0.6 0.0 - 1.3 10e9/L    Absolute Eosinophils 0.1 0.0 - 0.7 10e9/L    Absolute Basophils 0.0 0.0 - 0.2 10e9/L    Abs Immature Granulocytes 0.1 0 - 0.4 10e9/L    Absolute Nucleated RBC 0.0    Troponin I     Status: None   Result Value Ref Range    Troponin I ES <0.015 0.000 - 0.045 ug/L   Results for orders placed or performed during the hospital encounter of 06/15/21   CT Abdomen Pelvis w Contrast     Status: None    Narrative    CT ABDOMEN PELVIS W CONTRAST 6/15/2021 8:34 AM    CLINICAL HISTORY: Abdominal pain, acute, nonlocalized; epigastric pain  and tenderness, history of uterine cancer    TECHNIQUE: CT scan of the abdomen and pelvis was performed following  injection of IV contrast. Multiplanar reformats were obtained. Dose  reduction techniques were used.  CONTRAST: 100mL Isovue-370    COMPARISON: CT 7/13/2017    FINDINGS:   LOWER CHEST: Mild right coronary artery calcifications.    HEPATOBILIARY: Post cholecystectomy with stable mild biliary ductal  dilatation. No obstructing mass lesion or radiodense stone.  Unremarkable liver.    PANCREAS: Normal.    SPLEEN: Normal.    ADRENAL GLANDS:  Normal.    KIDNEYS/BLADDER: Symmetric bilateral perinephric stranding is likely  chronic. Stable 3 mm nonobstructing lower right renal stone. No  hydronephrosis or hydroureter. Unremarkable urinary bladder.    BOWEL: Small duodenal diverticula. Diastases recti. Normal caliber  small bowel. Normal appendix. Unremarkable colon. No free air or free  fluid.    PELVIC ORGANS: Post hysterectomy.    ADDITIONAL FINDINGS: Moderate atherosclerosis.    MUSCULOSKELETAL: Spinal degenerative changes.      Impression    IMPRESSION:   1.  No acute or suspicious findings in the abdomen or pelvis is by the  patient's symptoms. No evidence of an inflammatory process or bowel  obstruction.    MARISELA BUSH MD   CBC with platelets differential     Status: Abnormal   Result Value Ref Range    WBC 7.3 4.0 - 11.0 10e9/L    RBC Count 3.59 (L) 3.8 - 5.2 10e12/L    Hemoglobin 11.5 (L) 11.7 - 15.7 g/dL    Hematocrit 35.9 35.0 - 47.0 %     78 - 100 fl    MCH 32.0 26.5 - 33.0 pg    MCHC 32.0 31.5 - 36.5 g/dL    RDW 13.3 10.0 - 15.0 %    Platelet Count 231 150 - 450 10e9/L    Diff Method Automated Method     % Neutrophils 65.3 %    % Lymphocytes 24.6 %    % Monocytes 7.0 %    % Eosinophils 1.9 %    % Basophils 0.6 %    % Immature Granulocytes 0.6 %    Nucleated RBCs 0 0 /100    Absolute Neutrophil 4.7 1.6 - 8.3 10e9/L    Absolute Lymphocytes 1.8 0.8 - 5.3 10e9/L    Absolute Monocytes 0.5 0.0 - 1.3 10e9/L    Absolute Eosinophils 0.1 0.0 - 0.7 10e9/L    Absolute Basophils 0.0 0.0 - 0.2 10e9/L    Abs Immature Granulocytes 0.0 0 - 0.4 10e9/L    Absolute Nucleated RBC 0.0    Comprehensive metabolic panel     Status: Abnormal   Result Value Ref Range    Sodium 137 133 - 144 mmol/L    Potassium 3.5 3.4 - 5.3 mmol/L    Chloride 109 94 - 109 mmol/L    Carbon Dioxide 22 20 - 32 mmol/L    Anion Gap 6 3 - 14 mmol/L    Glucose 215 (H) 70 - 99 mg/dL    Urea Nitrogen 8 7 - 30 mg/dL    Creatinine 0.80 0.52 - 1.04 mg/dL    GFR Estimate 79 >60  mL/min/[1.73_m2]    GFR Estimate If Black >90 >60 mL/min/[1.73_m2]    Calcium 9.1 8.5 - 10.1 mg/dL    Bilirubin Total 0.3 0.2 - 1.3 mg/dL    Albumin 3.3 (L) 3.4 - 5.0 g/dL    Protein Total 7.4 6.8 - 8.8 g/dL    Alkaline Phosphatase 99 40 - 150 U/L    ALT 23 0 - 50 U/L    AST 12 0 - 45 U/L   Lipase     Status: None   Result Value Ref Range    Lipase 118 73 - 393 U/L   UA with Microscopic     Status: Abnormal   Result Value Ref Range    Color Urine Light Yellow     Appearance Urine Clear     Glucose Urine 1000 (A) NEG^Negative mg/dL    Bilirubin Urine Negative NEG^Negative    Ketones Urine Negative NEG^Negative mg/dL    Specific Gravity Urine 1.005 1.003 - 1.035    Blood Urine Large (A) NEG^Negative    pH Urine 6.5 5.0 - 7.0 pH    Protein Albumin Urine 10 (A) NEG^Negative mg/dL    Urobilinogen mg/dL Normal 0.0 - 2.0 mg/dL    Nitrite Urine Negative NEG^Negative    Leukocyte Esterase Urine Moderate (A) NEG^Negative    Source Midstream Urine     WBC Urine 12 (H) 0 - 5 /HPF    RBC Urine 25 (H) 0 - 2 /HPF    Squamous Epithelial /HPF Urine 6 (H) 0 - 1 /HPF    Mucous Urine Present (A) NEG^Negative /LPF   Urine Culture     Status: None (Preliminary result)    Specimen: Midstream Urine   Result Value Ref Range    Specimen Description Midstream Urine     Special Requests Specimen received in preservative     Culture Micro PENDING           Results for orders placed or performed during the hospital encounter of 06/15/21   CT Head w/o Contrast     Status: None    Narrative    EXAM: CT HEAD W/O CONTRAST  LOCATION: [facility]  DATE/TIME: 6/15/2021 9:39 PM    INDICATION: Dizziness, non-specific  COMPARISON: None.  TECHNIQUE: Routine CT Head without IV contrast. Multiplanar reformats. Dose reduction techniques were used.    FINDINGS:  INTRACRANIAL CONTENTS: No intracranial hemorrhage, extraaxial collection, or mass effect.  No CT evidence of acute infarct. Mild presumed chronic small vessel ischemic changes. Mild  generalized volume loss. No hydrocephalus.     VISUALIZED ORBITS/SINUSES/MASTOIDS: Borderline proptosis. No paranasal sinus mucosal disease. No middle ear or mastoid effusion.    BONES/SOFT TISSUES: Anterior subluxation temporomandibular joints bilaterally.      Impression    IMPRESSION:  1.  No acute intracranial process.   Glucose by meter     Status: Abnormal   Result Value Ref Range    Glucose 199 (H) 70 - 99 mg/dL   Basic metabolic panel     Status: Abnormal   Result Value Ref Range    Sodium 139 133 - 144 mmol/L    Potassium 3.4 3.4 - 5.3 mmol/L    Chloride 112 (H) 94 - 109 mmol/L    Carbon Dioxide 20 20 - 32 mmol/L    Anion Gap 7 3 - 14 mmol/L    Glucose 149 (H) 70 - 99 mg/dL    Urea Nitrogen 6 (L) 7 - 30 mg/dL    Creatinine 0.78 0.52 - 1.04 mg/dL    GFR Estimate 83 >60 mL/min/[1.73_m2]    GFR Estimate If Black >90 >60 mL/min/[1.73_m2]    Calcium 8.2 (L) 8.5 - 10.1 mg/dL   CBC with platelets differential     Status: Abnormal   Result Value Ref Range    WBC 7.2 4.0 - 11.0 10e9/L    RBC Count 3.46 (L) 3.8 - 5.2 10e12/L    Hemoglobin 11.1 (L) 11.7 - 15.7 g/dL    Hematocrit 34.8 (L) 35.0 - 47.0 %     (H) 78 - 100 fl    MCH 32.1 26.5 - 33.0 pg    MCHC 31.9 31.5 - 36.5 g/dL    RDW 13.1 10.0 - 15.0 %    Platelet Count 216 150 - 450 10e9/L    Diff Method Automated Method     % Neutrophils 59.5 %    % Lymphocytes 29.3 %    % Monocytes 7.9 %    % Eosinophils 1.9 %    % Basophils 0.6 %    % Immature Granulocytes 0.8 %    Nucleated RBCs 0 0 /100    Absolute Neutrophil 4.3 1.6 - 8.3 10e9/L    Absolute Lymphocytes 2.1 0.8 - 5.3 10e9/L    Absolute Monocytes 0.6 0.0 - 1.3 10e9/L    Absolute Eosinophils 0.1 0.0 - 0.7 10e9/L    Absolute Basophils 0.0 0.0 - 0.2 10e9/L    Abs Immature Granulocytes 0.1 0 - 0.4 10e9/L    Absolute Nucleated RBC 0.0    Troponin I     Status: None   Result Value Ref Range    Troponin I ES <0.015 0.000 - 0.045 ug/L   Results for orders placed or performed during the hospital encounter of  06/15/21   CT Abdomen Pelvis w Contrast     Status: None    Narrative    CT ABDOMEN PELVIS W CONTRAST 6/15/2021 8:34 AM    CLINICAL HISTORY: Abdominal pain, acute, nonlocalized; epigastric pain  and tenderness, history of uterine cancer    TECHNIQUE: CT scan of the abdomen and pelvis was performed following  injection of IV contrast. Multiplanar reformats were obtained. Dose  reduction techniques were used.  CONTRAST: 100mL Isovue-370    COMPARISON: CT 7/13/2017    FINDINGS:   LOWER CHEST: Mild right coronary artery calcifications.    HEPATOBILIARY: Post cholecystectomy with stable mild biliary ductal  dilatation. No obstructing mass lesion or radiodense stone.  Unremarkable liver.    PANCREAS: Normal.    SPLEEN: Normal.    ADRENAL GLANDS: Normal.    KIDNEYS/BLADDER: Symmetric bilateral perinephric stranding is likely  chronic. Stable 3 mm nonobstructing lower right renal stone. No  hydronephrosis or hydroureter. Unremarkable urinary bladder.    BOWEL: Small duodenal diverticula. Diastases recti. Normal caliber  small bowel. Normal appendix. Unremarkable colon. No free air or free  fluid.    PELVIC ORGANS: Post hysterectomy.    ADDITIONAL FINDINGS: Moderate atherosclerosis.    MUSCULOSKELETAL: Spinal degenerative changes.      Impression    IMPRESSION:   1.  No acute or suspicious findings in the abdomen or pelvis is by the  patient's symptoms. No evidence of an inflammatory process or bowel  obstruction.    MARISELA BUSH MD   CBC with platelets differential     Status: Abnormal   Result Value Ref Range    WBC 7.3 4.0 - 11.0 10e9/L    RBC Count 3.59 (L) 3.8 - 5.2 10e12/L    Hemoglobin 11.5 (L) 11.7 - 15.7 g/dL    Hematocrit 35.9 35.0 - 47.0 %     78 - 100 fl    MCH 32.0 26.5 - 33.0 pg    MCHC 32.0 31.5 - 36.5 g/dL    RDW 13.3 10.0 - 15.0 %    Platelet Count 231 150 - 450 10e9/L    Diff Method Automated Method     % Neutrophils 65.3 %    % Lymphocytes 24.6 %    % Monocytes 7.0 %    % Eosinophils 1.9 %     % Basophils 0.6 %    % Immature Granulocytes 0.6 %    Nucleated RBCs 0 0 /100    Absolute Neutrophil 4.7 1.6 - 8.3 10e9/L    Absolute Lymphocytes 1.8 0.8 - 5.3 10e9/L    Absolute Monocytes 0.5 0.0 - 1.3 10e9/L    Absolute Eosinophils 0.1 0.0 - 0.7 10e9/L    Absolute Basophils 0.0 0.0 - 0.2 10e9/L    Abs Immature Granulocytes 0.0 0 - 0.4 10e9/L    Absolute Nucleated RBC 0.0    Comprehensive metabolic panel     Status: Abnormal   Result Value Ref Range    Sodium 137 133 - 144 mmol/L    Potassium 3.5 3.4 - 5.3 mmol/L    Chloride 109 94 - 109 mmol/L    Carbon Dioxide 22 20 - 32 mmol/L    Anion Gap 6 3 - 14 mmol/L    Glucose 215 (H) 70 - 99 mg/dL    Urea Nitrogen 8 7 - 30 mg/dL    Creatinine 0.80 0.52 - 1.04 mg/dL    GFR Estimate 79 >60 mL/min/[1.73_m2]    GFR Estimate If Black >90 >60 mL/min/[1.73_m2]    Calcium 9.1 8.5 - 10.1 mg/dL    Bilirubin Total 0.3 0.2 - 1.3 mg/dL    Albumin 3.3 (L) 3.4 - 5.0 g/dL    Protein Total 7.4 6.8 - 8.8 g/dL    Alkaline Phosphatase 99 40 - 150 U/L    ALT 23 0 - 50 U/L    AST 12 0 - 45 U/L   Lipase     Status: None   Result Value Ref Range    Lipase 118 73 - 393 U/L   UA with Microscopic     Status: Abnormal   Result Value Ref Range    Color Urine Light Yellow     Appearance Urine Clear     Glucose Urine 1000 (A) NEG^Negative mg/dL    Bilirubin Urine Negative NEG^Negative    Ketones Urine Negative NEG^Negative mg/dL    Specific Gravity Urine 1.005 1.003 - 1.035    Blood Urine Large (A) NEG^Negative    pH Urine 6.5 5.0 - 7.0 pH    Protein Albumin Urine 10 (A) NEG^Negative mg/dL    Urobilinogen mg/dL Normal 0.0 - 2.0 mg/dL    Nitrite Urine Negative NEG^Negative    Leukocyte Esterase Urine Moderate (A) NEG^Negative    Source Midstream Urine     WBC Urine 12 (H) 0 - 5 /HPF    RBC Urine 25 (H) 0 - 2 /HPF    Squamous Epithelial /HPF Urine 6 (H) 0 - 1 /HPF    Mucous Urine Present (A) NEG^Negative /LPF   Urine Culture     Status: None (Preliminary result)    Specimen: Midstream Urine   Result  Value Ref Range    Specimen Description Midstream Urine     Special Requests Specimen received in preservative     Culture Micro PENDING      Medications   0.9% sodium chloride BOLUS (0 mLs Intravenous Stopped 6/15/21 2203)     Followed by   sodium chloride 0.9% infusion ( Intravenous New Bag 6/15/21 2224)   prochlorperazine (COMPAZINE) injection 10 mg (10 mg Intravenous Given 6/15/21 2114)   ketorolac (TORADOL) injection 30 mg (30 mg Intravenous Given 6/15/21 2110)   cefTRIAXone (ROCEPHIN) 1 g vial to attach to  mL bag for ADULTS or NS 50 mL bag for PEDS (1 g Intravenous New Bag 6/15/21 2224)        Assessments & Plan (with Medical Decision Making)   61-year-old female presents to us with a chief complaint of headache, high blood pressure, dizziness, nausea.  She was previously seen today and diagnosed with a UTI at a previous emergency department.  She did have a CT scan that was unremarkable.  Differential today includes dysrhythmia, overexertion, intercranial abnormality, her UTI.  Blood pressure was somewhat elevated here.  Patient states she took her normal blood pressure medications for him.  She was given fluids, Toradol, Compazine.  This did greatly improve her symptoms.  CT scan of her head was unremarkable.  Labs and EKG were reassuring.  Symptoms are likely secondary to the patient's UTI for which she was prescribed antibiotics.  We will give her a prescription for Zofran in addition to the prescription for antibiotic she already had.  She is comfortable with discharge home and the plan.  Patient will follow up with primary care for a reevaluation of her blood pressure after her UTI is resolved.    I have reviewed the nursing notes. I have reviewed the findings, diagnosis, plan and need for follow up with the patient.    New Prescriptions    ONDANSETRON (ZOFRAN ODT) 4 MG ODT TAB    Take 1 tablet (4 mg) by mouth every 8 hours as needed for nausea       Final diagnoses:   Urinary tract infection  without hematuria, site unspecified   Dizziness   Nausea   I, Honorio Gallardo am serving as a trained medical scribe to document services personally performed by Kavin Martin DO, based on the provider's statements to me.     I, Kavin Martin DO, was physically present and have reviewed and verified the accuracy of this note documented by Honorio Gallardo.      --  Kavin Martin DO  [facility] EMERGENCY DEPARTMENT  6/15/2021     Kavin Martin DO  06/15/21 [id number]

## 2021-06-15 NOTE — ED PROVIDER NOTES
History     Chief Complaint:  Abdominal Pain     HPI   Nena Tang is a 61 year old female with history of hypertension, hyperlipidemia, IBS, Coronary Artery Disease, substance abuse, type II diabetes, schizoaffective disorder, who presents for evaluation of abdominal pain. The patient reports that she was treated for a UTI but this reoccurred 1.5 weeks ago and was treated again with a 5 days course of antibiotics. However, for the past week she has been experiencing intermittent abdominal cramping with poor PO intake and nausea. The patient endorses associated diarrhea and chills. She has cramping currently and rates this as a 9/10. The patient denies new shortness of breath, vomiting, blood in stool, chest pain, leg swelling, dysuria, frequency, or history of similar pain. No history of abdominal surgeries or other recent medication changes.     Review of Systems   Constitutional: Positive for chills.   Respiratory: Negative for shortness of breath.    Cardiovascular: Negative for chest pain and leg swelling.   Gastrointestinal: Positive for abdominal pain, diarrhea and nausea. Negative for blood in stool and vomiting.   Genitourinary: Negative for dysuria and frequency.   All other systems reviewed and are negative.      Allergies:  Imidazole Antifungals  Ketoprofen  Lisinopril  Metformin  Metronidazole  Posaconazole    Medications:  amantadine  aspirin  atorvastatin   Clonazepam   escitalopram   famotidine   fluticasone-salmeterol  gabapentin  hydroxyzine   Lantus Pen   Novolog   Duoneb   losartan   metoprolol succinate   nystatin   paliperidone ER   senna-docusate  topiramate  Trulicity   Trazodone     Past Medical History:    Acute respiratory failure with hypoxia    Coronary Artery Disease    Chronic low back pain   Cocaine abuse   Fecal urgency   heroin abuse  Hyperlipidemia   Hypertension   Illiterate   Irritable bowel syndrome   Left cataract   Migraine   Moderate major depression  Noncompliance  with medication regimen   Obesity   CINDY  Osteopenia   Pneumonia  Schizoaffective disorder   Sepsis    Suicidal intent   Takotsubo cardiomyopathy   Type 2 diabetes mellitus    Uterine cancer   Verbal auditory hallucination      Past Surgical History:    Oophorectomy  Cataracts  Colonoscopy  Hysterectomy   Coronary CTA   choleystectomy  phacoemulsification clear cornea with standard intraocular lens implant   Release trigger finger    Family History:    Mother: bladder cancer, COPD, cirrhosis of liver, alcohol/drug, hypertension, diabetes, Coronary Artery Disease, glaucoma, hyperlipidemia   Sister: alcohol/drug, psychotic disorder  Brothers: colon cancer, alcohol/drugs    Social History:  The patient was unaccompanied to the ED.  Patient has siblings.     Physical Exam     Patient Vitals for the past 24 hrs:   BP Temp Temp src Pulse Resp SpO2   06/15/21 0800 (!) 166/93 -- -- 93 -- 92 %   06/15/21 0745 (!) 161/91 -- -- 91 -- 94 %   06/15/21 0730 (!) 149/78 -- -- 93 -- 96 %   06/15/21 0700 (!) 161/90 -- -- 93 -- 95 %   06/15/21 0645 (!) 164/85 -- -- 90 -- 98 %   06/15/21 0630 (!) 164/84 -- -- 92 -- 96 %   06/15/21 0616 (!) 176/84 98.2  F (36.8  C) Oral 93 20 98 %     Physical Exam    Constitutional: Well developed, nontox appearance  Head: Atraumatic.   Mouth/Throat: Oropharynx is clear and moist.   Neck:  no stridor  Eyes: no scleral icterus  Cardiovascular: RRR, 2+ bilat radial pulses  Pulmonary/Chest: nml resp effort, Clear BS bilat  Abdominal: ND, soft, NT, no rebound or guarding   : no CVA tenderness bilat  Ext: Warm, well perfused, no edema  Neurological: A&O, symmetric facies, moves ext x4  Skin: Skin is warm and dry.   Psychiatric: Behavior is normal. Thought content normal.   Nursing note and vitals reviewed.    Emergency Department Course     Imaging:    CT Abdomen Pelvis w Contrast   1.  No acute or suspicious findings in the abdomen or pelvis is by the  patient's symptoms. No evidence of an inflammatory  process or bowel  Obstruction.  MARISELA BUSH MD  Reading per radiology     Laboratory:    UA: Glucose 1000 (A), Blood large (A), Protein Albumin 10 (A), Leukocyte Esterase moderate (A), WBC 12 (H), RBC 25 (H), Squamous Epithelial 6 (H), Mucous present (A), o/w WNL.     CBC: WBC 7.3, HGB 11.5 (L),   CMP: Glucose 215 (H), Albumin 3.3 (L), o/w WNL (Creatinine 0.80)    Lipase: 118    Emergency Department Course:    Reviewed:    I reviewed the patient's nursing notes, vitals, past medical records, Care Everywhere.     Assessments:    0641 I performed an exam of the patient as documented above.     1040 Patient rechecked and updated.     Interventions:  07 Zofran 4 mg IV  0723 NS 1000 mL IV  0723 GI Cocktail 30 mL PO  0825 Morphine 4 mg IV   1023 NS IV     Disposition:  The patient was discharged to home.     Impression & Plan      CMS Diagnoses: none    Medical Decision Makin year old female presenting w/ LUQ abd pain, nausea     DDx includes gastritis, pancreatitis, hepatitis, abdominal pain NOS, constipation, intra-abdominal abscess, hollow viscus perforation.  Less likely atypical ACS given reproducible pain on exam.  Doubt vascular catastrophe or dissection.  Labs largely unremarkable, UA demonstrated findings of infection but somewhat limited secondary to contamination.  Imaging sig for no acute intra-abdominal pathology.  Interventions as noted above with improvement in symptoms.  Urine culture ordered and pending.  I think it be reasonable to treat the patient with Macrobid in the interim given she was recently on Keflex.  There is likely a component of gastritis as well possibly secondary to his antibiotic usage.  At this time I feel the pt is safe for discharge.  Recommendations given regarding follow up with PCP and return to the emergency department as needed for new or worsening symptoms.  Pt counseled on all results, disposition and diagnosis.  They are understanding and agreeable to  plan. Patient discharged in stable condition.     Diagnosis:    ICD-10-CM    1. Abdominal pain, left upper quadrant  R10.12    2. Urinary tract infection with hematuria, site unspecified  N39.0     R31.9      Discharge Medications:  New Prescriptions    COMPOUNDED NON-CONTROLLED SUBSTANCE (CMPD RX) - PHARMACY TO MIX COMPOUNDED MEDICATION    Please compound viscous lidocaine 2% and maalox in a 1:1 ratio    Please take 15 to 30 ml by mouth every 4-6 hours as needed for abdominal pain    NITROFURANTOIN MACROCRYSTAL-MONOHYDRATE (MACROBID) 100 MG CAPSULE    Take 1 capsule (100 mg) by mouth 2 times daily    PANTOPRAZOLE (PROTONIX) 40 MG EC TABLET    Take 1 tablet (40 mg) by mouth daily     Scribe Disclosure:  I, Orla Severson, am serving as a scribe at 6:41 AM on 6/15/2021 to document services personally performed by Cuco Stacy MD based on my observations and the provider's statements to me.     [facility] EMERGENCY DEPT         Cuco Stacy MD  06/15/21 0708

## 2021-06-15 NOTE — PROGRESS NOTES
"Patient Name: Nena Tang      YOB: 1960     Medical Record Number: [id number]  Diagnosis:    Chronic pain syndrome  Fibromyalgia  Chronic bilateral low back pain without sciatica      Visit Info Length of Visit: 30 min  Therapeutic Procedures/Exercises: 15 min; Therapeutic Activities: NA; Neuromuscular Re-education: NA;  Self Care / Home Management Training: 15 min     Exercise/Activity Education Instruction:  Home Exercise Program:   - Hallway walk with 4ww - 600 ft in 10 min - a few standing rest breaks within the walk.   - Review of HEP on \"off\" drop in center days  Self Care:   - use ice/heat to hips/LB regularly  - use 4ww with all ambulation for energy conservation  - briefly discussed importance of nutrition/hydration (pt had not eaten today)     Notes:   Patient reports: she was in ER earlier in the day d/t abdominal cramps/UTI; released.  Came in to PT and indicates she continues to feel horrible - cramping persists; when asked if she would like to cancel today's visit pt indicated no, \"maybe a little exercise will be helpful.\"    Activity tolerance: poor over interval    HEP/Self Care/Home Management Training:   Has not been doing much over interval - not feeling well, still recovering from hospitalization.    After walk completed, pt reported she felt dizzy, was having double vision and felt horrible.  She reported she had been feeling this all day but upon completion of the walk in PT it felt like the dizziness and double vision were worsening.  She had not eaten all day, wasn't sure what her blood sugars were but stated sx she was feeling felt different than what she usually feels when she has low blood sugars.  Took BP - noted at 168/98.  Pulse 96 (taken manually - appeared to have a skipped beat periodically).  (Of note, immediately prior to PT pt had stepped across the harden to her primary care clinic with same c/o and they triaged her - see note.)  Offered to take pt to ER.  She " became tearful stating she was tired of going to ER/needing medical attention, wasn't sure what to do.  Sx continued to worsen so pt eventaully decided to go to ER.  Pt taken to ER via w/c.     Demonstrates/Verbalizes Technique: 3 (1= poor technique-difficulty performing exercises,significant cues required; 5= good technique-performs exercises without cues)  Body Awareness: 1,2 (1=low awareness; 5=high awareness)  Posture/Stability: 2 (1= poor posture, stability; 5= good posture, stability)   Motivational Level:   Cooperative Participation:  Full   Patient Satisfaction:  Not assessed   Response to Teaching:   verbalized, recall/understanding  Factors that affect learning: low health literacy   Plan of Care  Taken to ER from PT appt (see above).  Cont PT to support reactivation and integration of self regulation pain management skills when feeling better.  Pt is scheduled to return to clinic next week.   Therapist: Pam Be, PT                  Date: 6/15/2021

## 2021-06-15 NOTE — TELEPHONE ENCOUNTER
Reason for Call:  Medication or medication refill:    Do you use a [facility]?  Name of the pharmacy and phone number for the current request:  [id number] - [place] [address]    Name of the medication requested: gabapentin (NEURONTIN) 300 MG capsule    Other request: NA    Can we leave a detailed message on this number? Not Applicable    Phone number patient can be reached at: Other phone number: PHARMACY [phone]    Call taken on 6/15/2021 at 4:29 PM by Kristie Montana

## 2021-06-16 LAB
BACTERIA SPEC CULT: NORMAL
INTERPRETATION ECG - MUSE: NORMAL
Lab: NORMAL
SPECIMEN SOURCE: NORMAL

## 2021-06-16 RX ORDER — GABAPENTIN 300 MG/1
300 CAPSULE ORAL AT BEDTIME
Qty: 30 CAPSULE | Refills: 3 | Status: SHIPPED | OUTPATIENT
Start: 2021-06-16 | End: 2021-10-18

## 2021-06-16 NOTE — TELEPHONE ENCOUNTER
Pt scheduled for clinic visit at 1:30 on Mon 6/21.    Crissy Pulido RN  [facility]

## 2021-06-16 NOTE — RESULT ENCOUNTER NOTE
Final urine culture report is negative.  Adult Negative Urine culture parameters per protocol: Any # Urogenital single or mixed organism, <10,000 col/ml single organism (cath specimen), and <50,000 col/ml single organism (midstream or cath specimen).  [facility] Emergency Dept discharge antibiotic prescribed (If applicable): Nitrofurantoin Macrocrystal-Monohydrate (Macrobid) 100 mg PO capsule, 1 capsule (100 mg) by mouth 2 times daily for 7 days  Treatment recommendations per [facility] ED Lab Result Urine Culture protocol.

## 2021-06-21 ENCOUNTER — OFFICE VISIT (OUTPATIENT)
Dept: FAMILY MEDICINE | Facility: CLINIC | Age: 61
End: 2021-06-21
Payer: COMMERCIAL

## 2021-06-21 VITALS
BODY MASS INDEX: 42.41 KG/M2 | SYSTOLIC BLOOD PRESSURE: 134 MMHG | HEIGHT: 60 IN | TEMPERATURE: 96.7 F | RESPIRATION RATE: 18 BRPM | OXYGEN SATURATION: 97 % | WEIGHT: 216 LBS | DIASTOLIC BLOOD PRESSURE: 80 MMHG | HEART RATE: 87 BPM

## 2021-06-21 DIAGNOSIS — N30.01 ACUTE CYSTITIS WITH HEMATURIA: Primary | ICD-10-CM

## 2021-06-21 DIAGNOSIS — R45.89 THOUGHTS OF SELF HARM: ICD-10-CM

## 2021-06-21 DIAGNOSIS — Z79.4 TYPE 2 DIABETES MELLITUS WITH MILD NONPROLIFERATIVE RETINOPATHY WITHOUT MACULAR EDEMA, WITH LONG-TERM CURRENT USE OF INSULIN, UNSPECIFIED LATERALITY (H): ICD-10-CM

## 2021-06-21 DIAGNOSIS — B37.31 YEAST INFECTION OF THE VAGINA: ICD-10-CM

## 2021-06-21 DIAGNOSIS — F25.1 SCHIZOAFFECTIVE DISORDER, DEPRESSIVE TYPE (H): ICD-10-CM

## 2021-06-21 DIAGNOSIS — G89.4 CHRONIC PAIN SYNDROME: ICD-10-CM

## 2021-06-21 DIAGNOSIS — F43.10 POSTTRAUMATIC STRESS DISORDER: ICD-10-CM

## 2021-06-21 DIAGNOSIS — E11.3299 TYPE 2 DIABETES MELLITUS WITH MILD NONPROLIFERATIVE RETINOPATHY WITHOUT MACULAR EDEMA, WITH LONG-TERM CURRENT USE OF INSULIN, UNSPECIFIED LATERALITY (H): ICD-10-CM

## 2021-06-21 DIAGNOSIS — N30.01 ACUTE CYSTITIS WITH HEMATURIA: ICD-10-CM

## 2021-06-21 DIAGNOSIS — R45.851 SUICIDAL IDEATION: ICD-10-CM

## 2021-06-21 DIAGNOSIS — I10 HTN, GOAL BELOW 140/90: ICD-10-CM

## 2021-06-21 LAB
ALBUMIN UR-MCNC: NEGATIVE MG/DL
APPEARANCE UR: CLEAR
BILIRUB UR QL STRIP: NEGATIVE
COLOR UR AUTO: YELLOW
GLUCOSE UR STRIP-MCNC: >=1000 MG/DL
HGB UR QL STRIP: NEGATIVE
KETONES UR STRIP-MCNC: NEGATIVE MG/DL
LEUKOCYTE ESTERASE UR QL STRIP: NEGATIVE
NITRATE UR QL: NEGATIVE
PH UR STRIP: 6.5 PH (ref 5–7)
RBC #/AREA URNS AUTO: ABNORMAL /HPF
SOURCE: ABNORMAL
SP GR UR STRIP: 1.02 (ref 1–1.03)
UROBILINOGEN UR STRIP-ACNC: 1 EU/DL (ref 0.2–1)
WBC #/AREA URNS AUTO: ABNORMAL /HPF

## 2021-06-21 PROCEDURE — 99214 OFFICE O/P EST MOD 30 MIN: CPT | Performed by: NURSE PRACTITIONER

## 2021-06-21 PROCEDURE — 81001 URINALYSIS AUTO W/SCOPE: CPT | Performed by: NURSE PRACTITIONER

## 2021-06-21 RX ORDER — FLUCONAZOLE 150 MG/1
150 TABLET ORAL DAILY
Qty: 2 TABLET | Refills: 0 | Status: SHIPPED | OUTPATIENT
Start: 2021-06-21 | End: 2021-06-23

## 2021-06-21 ASSESSMENT — MIFFLIN-ST. JEOR: SCORE: 1471.27

## 2021-06-21 NOTE — PROGRESS NOTES
Assessment & Plan     Acute cystitis with hematuria  Patient reports improving symptoms but still itchy, repeat UA shows cleared UTI. Most likely symptoms are form her uncontrolled diabetes.   - UA with Microscopic reflex to Culture; Future    Type 2 diabetes mellitus with mild nonproliferative retinopathy without macular edema, with long-term current use of insulin, unspecified laterality (H)  Inadequately controlled. Last A1C was 9.0 from her [facility] admission 5/8/21. Will continue to monitor.   Lab Results   Component Value Date    A1C 9.1 12/01/2020    A1C 9.7 09/15/2020    A1C 8.6 06/30/2020    A1C 6.2 12/03/2019    A1C 6.6 08/06/2019     - insulin glargine (LANTUS PEN) 100 UNIT/ML pen; Inject 35 Units Subcutaneous 2 times daily    Yeast infection of the vagina  Possible cause of her urticaria from her recent antibiotic use. Will treat with fluconazole and follow-up as needed.   - fluconazole (DIFLUCAN) 150 MG tablet; Take 1 tablet (150 mg) by mouth daily for 2 days    Chronic pain syndrome  Stable. Working with the pain clinic and also doing pain physical therapy.     HTN, goal below 140/90  Stable. Denies any symptoms, will continue to monitor.   BP Readings from Last 3 Encounters:   06/21/21 134/80   06/15/21 (!) 158/69   06/15/21 (!) 166/93       Schizoaffective disorder, depressive type (H)  Posttraumatic stress disorder  Suicidal ideation  Thoughts of self harm  Patient notes that her mood has been ok. Denies any suicidal thought or thoughts about wanting to harm herself.   - No medications changes. We will continue to monitor.   - She needs a therapist to check in on her frequently. Message will be sent to Juli ([facility] [place]) to reach out to her for follow-up therapy.     37 minutes spent on the date of the encounter doing chart review, history and exam, documentation and further activities per the note       Patient Instructions   - Blue pen (Novolog insulin) take 2 times per day with food- only 6 units.   -  Gray pen (Lantus insulin) take 35 units twice per day.    - Take 1 dose of diflucan for 2 days.   Call clinic with any questions or concerns.         Return in about 4 weeks (around 7/19/2021) for Routine Visit.    ART Fay CNP  [facility] BLAS Barrett is a 61 year old who presents for the following health issues:     HPI     Hospital Follow-up Visit:  Hospital/Nursing Home/ Rehab Facility: [facility]   Date of Admission: 6/15/2021  Date of Discharge: 6/15/2021  Reason(s) for Admission: UTI without hematuria, site unspecified       Was your hospitalization related to COVID-19? No   Problems taking medications regularly:  None  Medication changes since discharge: Patient was prescribed an antibiotic - doesn't remember the name of medication   Problems adhering to non-medication therapy:  None    Summary of hospitalization:  [place] [facility].     Diagnostic Tests/Treatments reviewed.  Follow up needed: none  Other Healthcare Providers Involved in Patient s Care:         Homecare and MTM  Update since discharge: improved.       Post Discharge Medication Reconciliation: discharge medications reconciled and changed, per note/orders.  Plan of care communicated with patient              Diabetes Follow-up  Patient reports that she has been taking Lantus 30 units BID and Novolog 30 units twice per day. Started the new dosing 3 over the past three day.   Gabby readings:  278 B?G at 205 pm.   BG ranging 168-355  Some low BG 64/73  How often are you checking your blood sugar? Continuous glucose monitor  What time of day are you checking your blood sugars (select all that apply)?  Before meals  Have you had any blood sugars above 200?  Yes   Have you had any blood sugars below 70?  Yes     What symptoms do you notice when your blood sugar is low?  Shaky and Dizzy    What concerns do you have today about your diabetes? Getting  infections often and Blood sugar is often over 200, peeing more.     BP Readings from Last 2 Encounters:   06/21/21 134/80   06/15/21 (!) 158/69     Hemoglobin A1C (%)   Date Value   12/01/2020 9.1 (H)   09/15/2020 9.7 (H)     LDL Cholesterol Calculated (mg/dL)   Date Value   12/01/2020 141 (H)   12/22/2019 86       Mental Health: Patient reports that he will be moving by herself in 2 weeks. Patient has a nurse every week, PCA daily for 6 hours. Sister will be checking in on her. Son will be also around. Has medical necklace to call for help if needed. Going to the drop in center Mon Tue and Thu. Patient notes that she is overall doing well. Currently does not see a therapist- denies any suicidal thoughts although very emotional when we discussed that I was not comfortable of her being by herself with knowing her mental health history.   We did discuss that safety is of most importance and this would be the biggest determinant of her living on her own.     Review of Systems   Constitutional, HEENT, cardiovascular, pulmonary, gi and gu systems are negative, except as otherwise noted.      Objective    /80   Pulse 87   Temp 96.7  F (35.9  C) (Temporal)   Resp 18   Ht 1.524 m (5')   Wt 98 kg (216 lb)   LMP 01/06/2015 (Exact Date)   SpO2 97%   Breastfeeding No   BMI 42.18 kg/m    Body mass index is 42.18 kg/m .     Physical Exam   GENERAL: healthy, alert and no distress  EYES: Eyes grossly normal to inspection, PERRL and conjunctivae and sclerae normal  NECK: no adenopathy, no asymmetry, masses, or scars and thyroid normal to palpation  RESP: lungs clear to auscultation - no rales, rhonchi or wheezes  CV: regular rate and rhythm, normal S1 S2, no S3 or S4, no murmur, click or rub, no peripheral edema and peripheral pulses strong  ABDOMEN: soft, nontender, no hepatosplenomegaly, no masses and bowel sounds normal  MS: no gross musculoskeletal defects noted, no edema  NEURO: Normal strength and tone,  mentation intact and speech normal    Results for orders placed or performed in visit on 06/21/21   UA with Microscopic reflex to Culture     Status: Abnormal    Specimen: Midstream Urine   Result Value Ref Range    Color Urine Yellow     Appearance Urine Clear     Glucose Urine >=1000 (A) NEG^Negative mg/dL    Bilirubin Urine Negative NEG^Negative    Ketones Urine Negative NEG^Negative mg/dL    Specific Gravity Urine 1.020 1.003 - 1.035    pH Urine 6.5 5.0 - 7.0 pH    Protein Albumin Urine Negative NEG^Negative mg/dL    Urobilinogen Urine 1.0 0.2 - 1.0 EU/dL    Nitrite Urine Negative NEG^Negative    Blood Urine Negative NEG^Negative    Leukocyte Esterase Urine Negative NEG^Negative    Source Midstream Urine     WBC Urine 0 - 5 OTO5^0 - 5 /HPF    RBC Urine O - 2 OTO2^O - 2 /HPF

## 2021-06-21 NOTE — Clinical Note
Hey, sorry to fill up your inbasket on your time off. She needs therapy and also a long term plan for therapy. We can touch base when you get back. Thanks! Rowena.

## 2021-06-21 NOTE — PATIENT INSTRUCTIONS
- Blue pen (Novolog insulin) take 2 times per day with food- only 6 units.   - Gray pen (Lantus insulin) take 35 units twice per day.    - Take 1 dose of diflucan for 2 days.   Call clinic with any questions or concerns.

## 2021-06-22 ENCOUNTER — OFFICE VISIT (OUTPATIENT)
Dept: PALLIATIVE MEDICINE | Facility: CLINIC | Age: 61
End: 2021-06-22
Payer: COMMERCIAL

## 2021-06-22 DIAGNOSIS — M79.7 FIBROMYALGIA: ICD-10-CM

## 2021-06-22 DIAGNOSIS — M54.50 CHRONIC BILATERAL LOW BACK PAIN WITHOUT SCIATICA: ICD-10-CM

## 2021-06-22 DIAGNOSIS — G89.29 CHRONIC BILATERAL LOW BACK PAIN WITHOUT SCIATICA: ICD-10-CM

## 2021-06-22 DIAGNOSIS — G89.4 CHRONIC PAIN SYNDROME: Primary | ICD-10-CM

## 2021-06-22 PROCEDURE — 97110 THERAPEUTIC EXERCISES: CPT | Mod: GP | Performed by: PHYSICAL THERAPIST

## 2021-06-22 PROCEDURE — 97535 SELF CARE MNGMENT TRAINING: CPT | Mod: GP | Performed by: PHYSICAL THERAPIST

## 2021-06-22 NOTE — PROGRESS NOTES
"Patient Name: Nena Tang      YOB: 1960     Medical Record Number: [id number]  Diagnosis:    Chronic pain syndrome  Fibromyalgia  Chronic bilateral low back pain without sciatica    Visit Info Length of Visit: 45 min  Therapeutic Procedures/Exercises: 20 min; Therapeutic Activities: NA; Neuromuscular Re-education: NA;  Self Care / Home Management Trainin min     Exercise/Activity Education Instruction:  Home Exercise Program:   - Walking program: Hallway walk w/4ww 500 ft in 13'45\" (exclusive of 6 min sit break at FDC interval).  Pt moving very slowly but is steady with use of walker.  Discussed aiming to do two days of independent HEP on off days of drop in group (which occurs 3x/wk) - either hallway walk or seated ex - choose based on how she is feeling.    - Educated in benefits of consistent exercise as it relates to pain management and mood regulation (increase endorphins, aid with sleep, build energy reserve and endurance, improve flexibility for improved posture and body mechanics).     Self Care:   - Pacing / EC for moving: with readying for move, move in, decorating and setting up her new space   - Continue to encourage using her walker more consistently as an EC tool - pt notes she feels better, more stable and legs less wobbly when using 4ww.  Able to tolerate longer durations of walking / time spent on her feet when she uses 4ww.  Discussed long term benefits of consistent use (strengthening, increasing endurance, greater activity tolerance).     Notes:   Patient reports:   - she is feeling better since last here.  Strength and endurance still not back to baseline from hospital stay in early May.  - going to look at an apt tomorrow - hoping it will work and she will be able to move in soon  - son and niece have offered support and assist around her move    Activity tolerance: currently relies on her sister or PCA for most household activity - will be needing to do more for " herself once she is moved in on her own    HEP/Self Care/Home Management Training:   HEP only does exercise when she goes to drop in group; no walking on her own - at least not much since hospitalization   Self Care: rest   Body Awareness: none     Demonstrates/Verbalizes Technique: 3, 4 (1= poor technique-difficulty performing exercises,significant cues required; 5= good technique-performs exercises without cues)  Body Awareness: 2, 3 (1=low awareness; 5=high awareness)  Posture/Stability: 2, 3 (1= poor posture, stability; 5= good posture, stability)   Motivational Level:   Cooperative Participation:  Full   Patient Satisfaction:  satisfied   Response to Teaching:   demonstrated ability and verbalized, recall/understanding  Factors that affect learning: low health literacy   Plan of Care  Cont PT to support reactivation and integration of self regulation pain management skills. F/u in clinic in two weeks.   Therapist: Pam Be, PT                  Date: 6/22/2021

## 2021-06-25 NOTE — TELEPHONE ENCOUNTER
Self Exam: Abdomen soft, non-tender to palpatation, non-distended Triage - patient reports she did not get all her medications upon discharge from nursing home - pharmacy may need to help with insurance override so she can pick these up - please look for home care med reconciliation follow up

## 2021-06-28 ENCOUNTER — TELEPHONE (OUTPATIENT)
Dept: FAMILY MEDICINE | Facility: CLINIC | Age: 61
End: 2021-06-28

## 2021-06-28 NOTE — TELEPHONE ENCOUNTER
Reason for Call:  Form, our goal is to have forms completed with 72 hours, however, some forms may require a visit or additional information.    Type of letter, form or note:  medical    Who is the form from?: Home care    Where did the form come from: form was faxed in    What clinic location was the form placed at?: [facility]    Where the form was placed: [employer]    What number is listed as a contact on the form?: [phone]       Additional comments: Please fill out and fax back to number listed above.   Patient does have future appt made.   Future Appointments   Date Time Provider Department Center   6/29/2021  2:00 PM Eug[facility] De [facility]   7/27/2021  1:00 PM Rowena Haas APRN [facility]   7/27/2021  1:00 PM Eugenia De Jesus [facility]         Call taken on 6/28/2021 at 2:49 PM by Chelsea Chino

## 2021-06-29 ENCOUNTER — OFFICE VISIT (OUTPATIENT)
Dept: PHARMACY | Facility: CLINIC | Age: 61
End: 2021-06-29
Payer: COMMERCIAL

## 2021-06-29 VITALS
DIASTOLIC BLOOD PRESSURE: 70 MMHG | HEART RATE: 79 BPM | BODY MASS INDEX: 42.18 KG/M2 | WEIGHT: 216 LBS | OXYGEN SATURATION: 96 % | SYSTOLIC BLOOD PRESSURE: 100 MMHG | TEMPERATURE: 96.8 F

## 2021-06-29 DIAGNOSIS — E11.3299 TYPE 2 DIABETES MELLITUS WITH MILD NONPROLIFERATIVE RETINOPATHY WITHOUT MACULAR EDEMA, WITH LONG-TERM CURRENT USE OF INSULIN, UNSPECIFIED LATERALITY (H): Primary | ICD-10-CM

## 2021-06-29 DIAGNOSIS — Z79.4 TYPE 2 DIABETES MELLITUS WITH MILD NONPROLIFERATIVE RETINOPATHY WITHOUT MACULAR EDEMA, WITH LONG-TERM CURRENT USE OF INSULIN, UNSPECIFIED LATERALITY (H): Primary | ICD-10-CM

## 2021-06-29 PROCEDURE — 99606 MTMS BY PHARM EST 15 MIN: CPT | Performed by: PHARMACIST

## 2021-06-29 PROCEDURE — 99607 MTMS BY PHARM ADDL 15 MIN: CPT | Performed by: PHARMACIST

## 2021-06-29 NOTE — PROGRESS NOTES
Medication Therapy Management (MTM) Encounter    ASSESSMENT:                            Medication Adherence/Access: See below for considerations    Type 2 Diabetes: Patient is not meeting A1c goal of < 7%. Patient is not meeting average glucose goal of <150mg/dl Patient is not meeting goal of >70% time in target with continuous glucose monitoring.  Patient would benefit from   -scan CGM at least twice daily when she takes medications   -Basal Insulin (Lantus) :  stay on the same dose and take with PM pills. -Bolus / Rapid Acting Insulin (Novolog) : restart at prescribed dose.    PLAN:                            1. Take Novolog 7 units twice a day with meals as prescribed  2. Take Lantus with 9pm medications to improve adherence  3. Scan [facility]by CGM at 9am and 9pm    Follow-up: 1 month    SUBJECTIVE/OBJECTIVE:                          Nena Tang is a 61 year old female coming in for a follow-up visit. She was referred to me from Rowena Haas.  Today's visit is a follow-up MTM visit from 5/18/21     Reason for visit: diabetes check.    Tobacco: She reports that she has never smoked. She has never used smokeless tobacco.  Alcohol: not currently using      Medication Adherence/Access:   No concerns about pills but missing all Novolog injections, some Lantus (see below)  [phone] HCRN Blanka    Type 2 Diabetes: Pt currently prescribed Lantus 35u daily in AM (has been missing doses, estimate 1-2x/week), Novolog 7u -hasn't been using because she isn't eating as much and didn't think she needed it, Trulicity 1.5mg weekly (taking). Pt is not experiencing side effects.    SMBG: CGM. Has alarms on CGM set for 6 times per day to remind her to test, ignores them.       Hypoglycemia: none she is aware of  Recent symptoms of high blood sugar? had a yeast infection, resolved.   Eye exam: due  Foot exam: up to date  ACEi/ARB: losartan   Urine Albumin:   Lab Results   Component Value Date    MICROL 7 09/15/2020    Aspirin: Taking 81mg daily and denies side effects  Diet/Exercise: low appetite, eating twice a day but not as much.  Getting up 8:30-9am to go to the drop-in center (Mon/Tue/Thur). Eats at drop in [place], ex. Salad, sandwich (turkey), fruit (1 handful of watermelon, cherry, grapes).   On days she doesn't go to drop in [place], eats around noon (ex. tv dinner, leftovers).   Dinner: sister cooks (ex chicken, rice.) Heats up frozen meal.   Eats out about once week.   Gave up pop.   She is searching for a new apartment to live independently. Anticipates she will move at the end of July. Hasn't lived alone in many years, prior to relationship with her  .  Lab Results   Component Value Date    A1C 9.1 2020    A1C 9.7 09/15/2020    A1C 8.6 2020    A1C 6.2 2019    A1C 6.6 2019         Today's Vitals: /70   Pulse 79   Temp 96.8  F (36  C) (Temporal)   Wt 216 lb (98 kg)   LMP 2015 (Exact Date)   SpO2 96%   BMI 42.18 kg/m    ----------------      I spent 40 minutes with this patient today. All changes were made via collaborative practice agreement with Rowena Haas. A copy of the visit note was provided to the patient's primary care provider.    The patient was given a summary of these recommendations.     Eugenia De Jesus, PharmD, BCACP       Medication Therapy Recommendations  Type 2 diabetes mellitus with stage 3 chronic kidney disease, with long-term current use of insulin (H)    Current Medication: insulin aspart (NOVOLOG FLEXPEN) 100 UNIT/ML pen   Rationale: Patient forgets to take - Adherence - Adherence   Recommendation: Provide Education   Status: Patient Agreed - Adherence/Education

## 2021-06-29 NOTE — PATIENT INSTRUCTIONS
Recommendations from today's MTM visit:                                                       1. Take Lantus every night at 9pm with your pills.    2. Take Novolog 7 units twice a day when you eat.  Take it to the drop in center.    3. Check your sugar at 9am and 9pm. Your alarm will go off.    4. Restart your stool softener    Follow-up: 1 month 7/27    It was great to speak with you today.  I value your experience and would be very thankful for your time with providing feedback on our clinic survey. You may receive a survey via email or text message in the next few days.     To schedule another MTM appointment, please call the clinic directly or you may call the MTM scheduling line at [phone] or toll-free at [phone].     My Clinical Pharmacist's contact information:                                                      Please feel free to contact me with any questions or concerns you have.      Eugenia De Jesus, PharmD, BCACP   Medication Management Pharmacist   [facility]  [phone]

## 2021-07-01 ENCOUNTER — TELEPHONE (OUTPATIENT)
Dept: BEHAVIORAL HEALTH | Facility: CLINIC | Age: 61
End: 2021-07-01

## 2021-07-01 ENCOUNTER — MEDICAL CORRESPONDENCE (OUTPATIENT)
Dept: HEALTH INFORMATION MANAGEMENT | Facility: CLINIC | Age: 61
End: 2021-07-01

## 2021-07-01 NOTE — TELEPHONE ENCOUNTER
[facility] called patient to check in per PCP request. Patient reports she's doing alright, noting some depression. Patient reports fleeting passive SI. She denies intent or plan, stating that the thoughts are manageable and she feels safe. Provided patient with COPE# [phone]. Patient agreed to call if SI worsens. Patient agreed to schedule visit with [facility] on 7/29/21. [facility] did not have any earlier openings that worked with patient's schedule. Patient agreed to a brief phone check in, in 2 weeks.

## 2021-07-02 NOTE — TELEPHONE ENCOUNTER
Forms completed and faxed back to [facility] [phone]. Placed in abstraction to be scanned into patient's chart.    Richie Cisneros,   [facility]

## 2021-07-06 ENCOUNTER — OFFICE VISIT (OUTPATIENT)
Dept: PALLIATIVE MEDICINE | Facility: CLINIC | Age: 61
End: 2021-07-06
Payer: COMMERCIAL

## 2021-07-06 DIAGNOSIS — G89.29 CHRONIC BILATERAL LOW BACK PAIN WITHOUT SCIATICA: ICD-10-CM

## 2021-07-06 DIAGNOSIS — M54.50 CHRONIC BILATERAL LOW BACK PAIN WITHOUT SCIATICA: ICD-10-CM

## 2021-07-06 DIAGNOSIS — G89.4 CHRONIC PAIN SYNDROME: Primary | ICD-10-CM

## 2021-07-06 DIAGNOSIS — M79.7 FIBROMYALGIA: ICD-10-CM

## 2021-07-06 PROCEDURE — 97112 NEUROMUSCULAR REEDUCATION: CPT | Mod: GP | Performed by: PHYSICAL THERAPIST

## 2021-07-06 PROCEDURE — 97110 THERAPEUTIC EXERCISES: CPT | Mod: GP | Performed by: PHYSICAL THERAPIST

## 2021-07-06 PROCEDURE — 97535 SELF CARE MNGMENT TRAINING: CPT | Mod: GP | Performed by: PHYSICAL THERAPIST

## 2021-07-06 NOTE — PROGRESS NOTES
"Patient Name: Nena Tang      YOB: 1960     Medical Record Number: [id number]  Diagnosis:    Chronic pain syndrome  Fibromyalgia  Chronic bilateral low back pain without sciatica    Visit Info Length of Visit: 45 min  Therapeutic Procedures/Exercises: 15 min; Therapeutic Activities: NA; Neuromuscular Re-education: 10 min;  Self Care / Home Management Trainin min     Exercise/Activity Education Instruction:  Home Exercise Program:   - Two laps hallway walk (600 ft) 14'30\" with 4ww.  Focus on dropping shoulders into neutral position while walking - required v.cues initially.  Continue to encourage walking in apt hallways 2-3x/wk (on off days of going to [facility] in [place] where she does some group exercise).   Self Care:   - use of heat or alternate heat/cold for LBP and shoulder pain  - use of restorative supports for sitting (pillow under feet or behind back),  sidelying (pillow b/w legs) and supine (pillow under legs)  - use walker for most ambulation for greater stability, energy conservation and to support more neutral posture while walking.     Posture:  - neutral posture with walking     Notes:   Patient reports:   - states her left > right shoulder has been hurting the past couple days and her right hip/LB have been more bothersome x past 1 wk.  - still awaiting an apt - going to look at a couple end of this week    Activity levels: continues to go to [facility] in [place] - gets some exercise there, doesn't do a lot around her home - pretty sedentary, not doing much walking    HEP/Self Care/Home Management Training:   HEP nothing much over interval except what she gets in at [facility] in [place] - continues to modify those exs as needed  Self Care: rest; packed her heat/cold gel pack so doesn't have anything to use currently, NOT using her 4ww   Body Awareness: not addressed today     Demonstrates/Verbalizes Technique: 3 (1= poor technique-difficulty performing exercises,significant cues required; 5= " good technique-performs exercises without cues)  Body Awareness: 2, 3 (1=low awareness; 5=high awareness)  Posture/Stability: 2, 3 (1= poor posture, stability; 5= good posture, stability)   Motivational Level:   Cooperative Participation:  Full   Patient Satisfaction:  satisfied   Response to Teaching:   demonstrated ability and verbalized, recall/understanding  Factors that affect learning: low health literacy   Plan of Care  Cont PT to support reactivation and integration of self regulation pain management skills. F/u in two weeks in clinic for HEP progression as tolerated.   Therapist: Pam Be, PT                  Date: 7/6/2021

## 2021-07-12 ENCOUNTER — TELEPHONE (OUTPATIENT)
Dept: FAMILY MEDICINE | Facility: CLINIC | Age: 61
End: 2021-07-12

## 2021-07-12 NOTE — TELEPHONE ENCOUNTER
Reason for Call:  Form, our goal is to have forms completed with 72 hours, however, some forms may require a visit or additional information.    Type of letter, form or note: Plan of Care (06/30/21 through 08/28/21)    Who is the form from?: Home care    Where did the form come from: form was faxed in    What clinic location was the form placed at?: [facility]    Where the form was placed: [employer] Box/Folder    What number is listed as a contact on the form?: [phone]       Additional comments: Please review, sign, and fax back to number listed above.   Future Appointments   Date Time Provider Department Center   7/27/2021  1:00 PM Rowena Haas APRN CNP [facility]         Call taken on 7/12/2021 at 3:27 PM by Chelsea Chino

## 2021-07-15 ENCOUNTER — APPOINTMENT (OUTPATIENT)
Dept: GENERAL RADIOLOGY | Facility: CLINIC | Age: 61
End: 2021-07-15
Payer: COMMERCIAL

## 2021-07-15 ENCOUNTER — MEDICAL CORRESPONDENCE (OUTPATIENT)
Dept: HEALTH INFORMATION MANAGEMENT | Facility: CLINIC | Age: 61
End: 2021-07-15

## 2021-07-15 ENCOUNTER — HOSPITAL ENCOUNTER (EMERGENCY)
Facility: CLINIC | Age: 61
Discharge: HOME | End: 2021-07-15
Attending: FAMILY MEDICINE | Admitting: FAMILY MEDICINE
Payer: COMMERCIAL

## 2021-07-15 VITALS
SYSTOLIC BLOOD PRESSURE: 159 MMHG | DIASTOLIC BLOOD PRESSURE: 79 MMHG | HEART RATE: 85 BPM | OXYGEN SATURATION: 100 % | RESPIRATION RATE: 18 BRPM | TEMPERATURE: 97.9 F

## 2021-07-15 DIAGNOSIS — R73.9 HYPERGLYCEMIA: ICD-10-CM

## 2021-07-15 DIAGNOSIS — E11.69 TYPE 2 DIABETES MELLITUS WITH OTHER SPECIFIED COMPLICATION, UNSPECIFIED WHETHER LONG TERM INSULIN USE (H): ICD-10-CM

## 2021-07-15 LAB
ALBUMIN SERPL-MCNC: 3.5 G/DL (ref 3.4–5)
ALBUMIN UR-MCNC: NEGATIVE MG/DL
ALP SERPL-CCNC: 112 U/L (ref 40–150)
ALT SERPL W P-5'-P-CCNC: 25 U/L (ref 0–50)
ANION GAP SERPL CALCULATED.3IONS-SCNC: 11 MMOL/L (ref 3–14)
APPEARANCE UR: CLEAR
AST SERPL W P-5'-P-CCNC: 15 U/L (ref 0–45)
ATRIAL RATE - MUSE: 79 BPM
BASOPHILS # BLD AUTO: 0 10E3/UL (ref 0–0.2)
BASOPHILS NFR BLD AUTO: 1 %
BILIRUB SERPL-MCNC: 0.2 MG/DL (ref 0.2–1.3)
BILIRUB UR QL STRIP: NEGATIVE
BUN SERPL-MCNC: 10 MG/DL (ref 7–30)
CALCIUM SERPL-MCNC: 9.4 MG/DL (ref 8.5–10.1)
CHLORIDE BLD-SCNC: 105 MMOL/L (ref 94–109)
CO2 SERPL-SCNC: 19 MMOL/L (ref 20–32)
COLOR UR AUTO: ABNORMAL
CREAT SERPL-MCNC: 0.88 MG/DL (ref 0.52–1.04)
DIASTOLIC BLOOD PRESSURE - MUSE: NORMAL MMHG
EOSINOPHIL # BLD AUTO: 0.2 10E3/UL (ref 0–0.7)
EOSINOPHIL NFR BLD AUTO: 3 %
ERYTHROCYTE [DISTWIDTH] IN BLOOD BY AUTOMATED COUNT: 12.6 % (ref 10–15)
GFR SERPL CREATININE-BSD FRML MDRD: 72 ML/MIN/1.73M2
GLUCOSE BLD-MCNC: 348 MG/DL (ref 70–99)
GLUCOSE BLDC GLUCOMTR-MCNC: 218 MG/DL (ref 70–99)
GLUCOSE BLDC GLUCOMTR-MCNC: 266 MG/DL (ref 70–99)
GLUCOSE BLDC GLUCOMTR-MCNC: 330 MG/DL (ref 70–99)
GLUCOSE UR STRIP-MCNC: >=1000 MG/DL
HCT VFR BLD AUTO: 35.5 % (ref 35–47)
HGB BLD-MCNC: 11.6 G/DL (ref 11.7–15.7)
HGB UR QL STRIP: NEGATIVE
HOLD SPECIMEN: NORMAL
IMM GRANULOCYTES # BLD: 0.1 10E3/UL
IMM GRANULOCYTES NFR BLD: 1 %
INTERPRETATION ECG - MUSE: NORMAL
KETONES UR STRIP-MCNC: NEGATIVE MG/DL
LEUKOCYTE ESTERASE UR QL STRIP: ABNORMAL
LYMPHOCYTES # BLD AUTO: 1.9 10E3/UL (ref 0.8–5.3)
LYMPHOCYTES NFR BLD AUTO: 30 %
MCH RBC QN AUTO: 31.8 PG (ref 26.5–33)
MCHC RBC AUTO-ENTMCNC: 32.7 G/DL (ref 31.5–36.5)
MCV RBC AUTO: 97 FL (ref 78–100)
MONOCYTES # BLD AUTO: 0.5 10E3/UL (ref 0–1.3)
MONOCYTES NFR BLD AUTO: 8 %
NEUTROPHILS # BLD AUTO: 3.8 10E3/UL (ref 1.6–8.3)
NEUTROPHILS NFR BLD AUTO: 57 %
NITRATE UR QL: NEGATIVE
NRBC # BLD AUTO: 0 10E3/UL
NRBC BLD AUTO-RTO: 0 /100
P AXIS - MUSE: 42 DEGREES
PH UR STRIP: 7 [PH] (ref 5–7)
PLATELET # BLD AUTO: 203 10E3/UL (ref 150–450)
POTASSIUM BLD-SCNC: 3.4 MMOL/L (ref 3.4–5.3)
PR INTERVAL - MUSE: 182 MS
PROT SERPL-MCNC: 7.6 G/DL (ref 6.8–8.8)
QRS DURATION - MUSE: 84 MS
QT - MUSE: 394 MS
QTC - MUSE: 451 MS
R AXIS - MUSE: -53 DEGREES
RBC # BLD AUTO: 3.65 10E6/UL (ref 3.8–5.2)
RBC URINE: 0 /HPF
SODIUM SERPL-SCNC: 135 MMOL/L (ref 133–144)
SP GR UR STRIP: 1.02 (ref 1–1.03)
SQUAMOUS EPITHELIAL: 6 /HPF
SYSTOLIC BLOOD PRESSURE - MUSE: NORMAL MMHG
T AXIS - MUSE: -21 DEGREES
UROBILINOGEN UR STRIP-MCNC: NORMAL MG/DL
VENTRICULAR RATE- MUSE: 79 BPM
WBC # BLD AUTO: 6.5 10E3/UL (ref 4–11)
WBC URINE: 2 /HPF

## 2021-07-15 PROCEDURE — 81001 URINALYSIS AUTO W/SCOPE: CPT | Performed by: STUDENT IN AN ORGANIZED HEALTH CARE EDUCATION/TRAINING PROGRAM

## 2021-07-15 PROCEDURE — 85025 COMPLETE CBC W/AUTO DIFF WBC: CPT | Performed by: STUDENT IN AN ORGANIZED HEALTH CARE EDUCATION/TRAINING PROGRAM

## 2021-07-15 PROCEDURE — 36592 COLLECT BLOOD FROM PICC: CPT | Performed by: FAMILY MEDICINE

## 2021-07-15 PROCEDURE — 258N000003 HC RX IP 258 OP 636

## 2021-07-15 PROCEDURE — 96360 HYDRATION IV INFUSION INIT: CPT | Performed by: FAMILY MEDICINE

## 2021-07-15 PROCEDURE — 99285 EMERGENCY DEPT VISIT HI MDM: CPT | Mod: GC | Performed by: FAMILY MEDICINE

## 2021-07-15 PROCEDURE — 36415 COLL VENOUS BLD VENIPUNCTURE: CPT | Performed by: FAMILY MEDICINE

## 2021-07-15 PROCEDURE — 99285 EMERGENCY DEPT VISIT HI MDM: CPT | Mod: 25 | Performed by: FAMILY MEDICINE

## 2021-07-15 PROCEDURE — 71046 X-RAY EXAM CHEST 2 VIEWS: CPT

## 2021-07-15 PROCEDURE — 80053 COMPREHEN METABOLIC PANEL: CPT | Performed by: STUDENT IN AN ORGANIZED HEALTH CARE EDUCATION/TRAINING PROGRAM

## 2021-07-15 PROCEDURE — 96361 HYDRATE IV INFUSION ADD-ON: CPT | Performed by: FAMILY MEDICINE

## 2021-07-15 RX ORDER — SODIUM CHLORIDE 9 MG/ML
INJECTION, SOLUTION INTRAVENOUS
Status: COMPLETED
Start: 2021-07-15 | End: 2021-07-15

## 2021-07-15 RX ADMIN — SODIUM CHLORIDE: 9 INJECTION, SOLUTION INTRAVENOUS at 12:21

## 2021-07-15 ASSESSMENT — ENCOUNTER SYMPTOMS
PALPITATIONS: 0
CHILLS: 0
HEADACHES: 1
ABDOMINAL PAIN: 1
CONSTIPATION: 0
DIARRHEA: 1
NAUSEA: 0
VOMITING: 0
BACK PAIN: 0
SHORTNESS OF BREATH: 1
DYSURIA: 0
POLYDIPSIA: 1
FEVER: 0
FREQUENCY: 1

## 2021-07-15 NOTE — ED NOTES
Bed: ED20  Expected date: 7/15/21  Expected time: 10:54 AM  Means of arrival:   Comments:  [id number]  61 y.o Female   Blurred vision

## 2021-07-15 NOTE — ED PROVIDER NOTES
"ED Provider Note  [facility]      History     Chief Complaint   Patient presents with     Blood Sugar Problem     in 500's last night; difficult controlling bg lately,  per EMS, has blurry vision, dizziness.     HPI  Nenasisi Tang is a 61 year old female who presents with new fatigue in the context of elevated blood sugars x 2-3 weeks. Patient reports 2-3 weeks of blood sugars in the 400s with some as high has 500's. Patient has been drinking water at home to bring blood sugars down. Associated with blurry vision, shortness of breath, epigastric abdominal pain (burrning, pressure pain), watery diarrhea, polyuria, polydipsia, and headache (x2 days, temporal, pressure; \"little headache\"). Denies nausea, vomiting, chest pain, and palpitations.    At home, patient takes 7 novolog with meals and 35 lantus twice a day, but she admits to forgetting 2 times a week where she does not take her insulin at all. Patient is unable to communicate her oral medications, but states someone comes to her home to organize her pills for her, which she takes as instructed. States that her sister had bought her juice about 2 weeks ago that she had drank.     Has not had lunch today. Not currently hungry.    Last Hemoglobin a1c 12/1/2020 was 9.1    Past Medical and Surgical History, Medications, Allergies, and Social History were reviewed in the electronic medical record. Review with the patient was attempted but limited due to memory unable to recall.       Review of Systems   Constitutional: Negative for chills and fever.   Eyes: Positive for visual disturbance.   Respiratory: Positive for shortness of breath.    Cardiovascular: Negative for chest pain and palpitations.   Gastrointestinal: Positive for abdominal pain and diarrhea. Negative for constipation, nausea and vomiting.   Endocrine: Positive for polydipsia and polyuria.   Genitourinary: Positive for frequency. Negative for dysuria. "   Musculoskeletal: Negative for back pain and gait problem.   Neurological: Positive for headaches.         Physical Exam   BP: (!) 158/67  Pulse: 89  Temp: 97.9  F (36.6  C)  Resp: 18  SpO2: 98 %  Physical Exam  Constitutional:       Appearance: Normal appearance.   HENT:      Head: Normocephalic and atraumatic.      Mouth/Throat:      Mouth: Mucous membranes are moist.   Eyes:      Conjunctiva/sclera: Conjunctivae normal.   Cardiovascular:      Rate and Rhythm: Normal rate and regular rhythm.   Pulmonary:      Effort: Pulmonary effort is normal.   Abdominal:      General: Bowel sounds are normal.      Palpations: Abdomen is soft.      Tenderness: There is abdominal tenderness (diffuse, worse in epigastric region). There is no guarding or rebound.   Musculoskeletal:      Right lower leg: No edema.      Left lower leg: No edema.   Skin:     General: Skin is warm and dry.      Comments: No tenting   Neurological:      Mental Status: She is alert. Mental status is at baseline.   Psychiatric:         Mood and Affect: Mood normal.         Behavior: Behavior normal.         Thought Content: Thought content normal.         Judgment: Judgment normal.         ED Course     ED Course as of Jul 15 2333   Thu Jul 15, 2021   1339 Glucose by meter(!)   1339 UA with Microscopic reflex to Culture     Procedures            EKG Interpretation:      Interpreted by Ora Rose MD  Time reviewed: 12:53 PM  Symptoms at time of EKG: elevated blood sugar   Rhythm: normal sinus   Rate: Normal  Axis: Left Axis Deviation  Ectopy: none  Conduction: prolonged NH interval of 182  ST Segments/ T Waves: No acute ischemic changes  Q Waves: none  Comparison to prior: Unchanged from 6/15/2021    Clinical Impression: no acute changes      12:15 PM - Patient assessed. NS fluids started. Labs, UA, CXR, EKG ordered. Will recheck BS when NS complete.  2:38 PM - CXR negative, UA with small leukocyte esterase, but no dysuria. Will not treat for UTI at this  time, CBC with mildly low hemoglobin 11.6, otherwise normal. CMP pending. Glucose 266 s/p 1 L fluids  3:15 PM - Patient feeling well. Further discussed need to work with her PCP and the option of using an insulin pump instead of injections administered by her.     Results for orders placed or performed during the hospital encounter of 07/15/21   XR Chest 2 Views     Status: None    Narrative    XR CHEST 2 VW 7/15/2021 12:31 PM    HISTORY: r/o PNA. Fatigue and elevated blood sugars    COMPARISON: 10/5/2019      Impression    IMPRESSION: No focal infiltrate, pleural effusion or pneumothorax. The  cardiac and mediastinal silhouettes are normal. Right upper quadrant  surgical clips.    QING BRAR MD         SYSTEM ID:  [id number]   Glucose by meter     Status: Abnormal   Result Value Ref Range    GLUCOSE BY METER POCT 330 (H) 70 - 99 mg/dL   Extra Blue Top Tube     Status: None   Result Value Ref Range    Hold Specimen JIC    Extra Red Top Tube     Status: None   Result Value Ref Range    Hold Specimen JIC    Extra Green Top (Lithium Heparin) Tube     Status: None   Result Value Ref Range    Hold Specimen JIC    Extra Purple Top Tube     Status: None   Result Value Ref Range    Hold Specimen JIC    Comprehensive metabolic panel     Status: Abnormal   Result Value Ref Range    Sodium 135 133 - 144 mmol/L    Potassium 3.4 3.4 - 5.3 mmol/L    Chloride 105 94 - 109 mmol/L    Carbon Dioxide (CO2) 19 (L) 20 - 32 mmol/L    Anion Gap 11 3 - 14 mmol/L    Urea Nitrogen 10 7 - 30 mg/dL    Creatinine 0.88 0.52 - 1.04 mg/dL    Calcium 9.4 8.5 - 10.1 mg/dL    Glucose 348 (H) 70 - 99 mg/dL    Alkaline Phosphatase 112 40 - 150 U/L    AST 15 0 - 45 U/L    ALT 25 0 - 50 U/L    Protein Total 7.6 6.8 - 8.8 g/dL    Albumin 3.5 3.4 - 5.0 g/dL    Bilirubin Total 0.2 0.2 - 1.3 mg/dL    GFR Estimate 72 >60 mL/min/1.73m2   UA with Microscopic reflex to Culture     Status: Abnormal    Specimen: Urine, Midstream   Result Value Ref Range    Color  Urine Straw Colorless, Straw, Light Yellow, Yellow    Appearance Urine Clear Clear    Glucose Urine >=1000 (A) Negative mg/dL    Bilirubin Urine Negative Negative    Ketones Urine Negative Negative mg/dL    Specific Gravity Urine 1.021 1.003 - 1.035    Blood Urine Negative Negative    pH Urine 7.0 5.0 - 7.0    Protein Albumin Urine Negative Negative mg/dL    Urobilinogen Urine Normal Normal, 2.0 mg/dL    Nitrite Urine Negative Negative    Leukocyte Esterase Urine Small (A) Negative    RBC Urine 0 <=2 /HPF    WBC Urine 2 <=5 /HPF    Squamous Epithelials Urine 6 (H) <=1 /HPF    Narrative    Urine Culture not indicated   CBC with platelets and differential     Status: Abnormal   Result Value Ref Range    WBC Count 6.5 4.0 - 11.0 10e3/uL    RBC Count 3.65 (L) 3.80 - 5.20 10e6/uL    Hemoglobin 11.6 (L) 11.7 - 15.7 g/dL    Hematocrit 35.5 35.0 - 47.0 %    MCV 97 78 - 100 fL    MCH 31.8 26.5 - 33.0 pg    MCHC 32.7 31.5 - 36.5 g/dL    RDW 12.6 10.0 - 15.0 %    Platelet Count 203 150 - 450 10e3/uL    % Neutrophils 57 %    % Lymphocytes 30 %    % Monocytes 8 %    % Eosinophils 3 %    % Basophils 1 %    % Immature Granulocytes 1 %    NRBCs per 100 WBC 0 <1 /100    Absolute Neutrophils 3.8 1.6 - 8.3 10e3/uL    Absolute Lymphocytes 1.9 0.8 - 5.3 10e3/uL    Absolute Monocytes 0.5 0.0 - 1.3 10e3/uL    Absolute Eosinophils 0.2 0.0 - 0.7 10e3/uL    Absolute Basophils 0.0 0.0 - 0.2 10e3/uL    Absolute Immature Granulocytes 0.1 (H) <=0.0 10e3/uL    Absolute NRBCs 0.0 10e3/uL   Glucose by meter     Status: Abnormal   Result Value Ref Range    GLUCOSE BY METER POCT 266 (H) 70 - 99 mg/dL   Glucose by meter     Status: Abnormal   Result Value Ref Range    GLUCOSE BY METER POCT 218 (H) 70 - 99 mg/dL   EKG 12 lead     Status: None   Result Value Ref Range    Systolic Blood Pressure  mmHg    Diastolic Blood Pressure  mmHg    Ventricular Rate 79 BPM    Atrial Rate 79 BPM    KS Interval 182 ms    QRS Duration 84 ms     ms    QTc 451 ms     P Axis 42 degrees    R AXIS -53 degrees    T Axis -21 degrees    Interpretation ECG Click View Image link to view waveform and result    [place] Draw     Status: None    Narrative    The following orders were created for panel order [place] Draw.  Procedure                               Abnormality         Status                     ---------                               -----------         ------                     Extra Blue Top Tube[[id number]]                              Final result               Extra Red Top Tube[[id number]]                               Final result               Extra Green Top (Lithium...[[id number]]                      Final result               Extra Purple Top Tube[[id number]]                            Final result                 Please view results for these tests on the individual orders.   CBC with platelets differential     Status: Abnormal    Narrative    The following orders were created for panel order CBC with platelets differential.  Procedure                               Abnormality         Status                     ---------                               -----------         ------                     CBC with platelets and d...[[id number]]  Abnormal            Final result                 Please view results for these tests on the individual orders.     Medications   sodium chloride 0.9 % infusion (0 mLs  Stopped 7/15/21 1506)        Assessments & Plan (with Medical Decision Making)   Nena Tang is a 61 year old female presenting for elevated blood sugar at 500 last night with increased fatigue. Associated blurry vision, abdominal pain, shortness of breath, epigastric pain, diarrhea, polyuria, polydipsia, and HA. BS in . After 1L of normal saline, BS of 266. Did not give insulin at this time.    DDx - poorly controlled type 2 diabetes with elevated blood sugar vs hyperosmolar, hyperglycemic state due to PNA vs UTI vs AMI vs medication noncompliance    CXR  normal, UA with trace leukocyte esterase, but no urinary symptoms, ECG with no acute ischemic changes. Given patient's history and description of her insulin regiment, elevated blood sugar likely due to difficulty with insulin regiment. Recommended patient work with her PCP to work with her managing her diabetes, including considering an insulin pump.    I have reviewed the nursing notes. I have reviewed the findings, diagnosis, plan and need for follow up with the patient.    This data collected with the Resident working in the Emergency Department.  Patient was seen and evaluated by myself and I repeated the history and physical exam with the patient.  The plan of care was discussed with them.  The key portions of the note including the entire assessment and plan reflect my documentation.        Final diagnoses:   Hyperglycemia   Type 2 diabetes mellitus with other specified complication, unspecified whether long term insulin use (H)       --  Ora Rose MD  [facility] Family Medicine PGY-1    Emigdio Chavez  [facility] EMERGENCY DEPARTMENT  7/15/2021     Emigdio Chavez MD  07/15/21 3819

## 2021-07-15 NOTE — DISCHARGE INSTRUCTIONS
Discharge to home taking your insulin as prescribed following up with your primary clinic to discuss the possibility of an insulin pump.

## 2021-07-16 NOTE — TELEPHONE ENCOUNTER
Forms completed and faxed back to [facility] @ [phone]. Placed into [employer] to scan into patients chart.   Michael Gustafson MA

## 2021-07-19 ENCOUNTER — TELEPHONE (OUTPATIENT)
Dept: PALLIATIVE MEDICINE | Facility: CLINIC | Age: 61
End: 2021-07-19

## 2021-07-19 DIAGNOSIS — E11.22 TYPE 2 DIABETES MELLITUS WITH STAGE 3 CHRONIC KIDNEY DISEASE, WITH LONG-TERM CURRENT USE OF INSULIN (H): ICD-10-CM

## 2021-07-19 DIAGNOSIS — Z79.4 TYPE 2 DIABETES MELLITUS WITH HYPERGLYCEMIA, WITH LONG-TERM CURRENT USE OF INSULIN (H): ICD-10-CM

## 2021-07-19 DIAGNOSIS — R05.9 COUGH: ICD-10-CM

## 2021-07-19 DIAGNOSIS — I10 HTN, GOAL BELOW 140/90: ICD-10-CM

## 2021-07-19 DIAGNOSIS — Z79.4 TYPE 2 DIABETES MELLITUS WITH STAGE 3 CHRONIC KIDNEY DISEASE, WITH LONG-TERM CURRENT USE OF INSULIN (H): ICD-10-CM

## 2021-07-19 DIAGNOSIS — F20.89 OTHER SCHIZOPHRENIA (H): ICD-10-CM

## 2021-07-19 DIAGNOSIS — E11.65 TYPE 2 DIABETES MELLITUS WITH HYPERGLYCEMIA, WITH LONG-TERM CURRENT USE OF INSULIN (H): ICD-10-CM

## 2021-07-19 DIAGNOSIS — N18.30 TYPE 2 DIABETES MELLITUS WITH STAGE 3 CHRONIC KIDNEY DISEASE, WITH LONG-TERM CURRENT USE OF INSULIN (H): ICD-10-CM

## 2021-07-19 DIAGNOSIS — E11.65 TYPE 2 DIABETES MELLITUS WITH HYPERGLYCEMIA, WITHOUT LONG-TERM CURRENT USE OF INSULIN (H): Primary | ICD-10-CM

## 2021-07-19 DIAGNOSIS — E78.5 HYPERLIPIDEMIA LDL GOAL <100: ICD-10-CM

## 2021-07-19 RX ORDER — ATORVASTATIN CALCIUM 80 MG/1
TABLET, FILM COATED ORAL
Qty: 30 TABLET | Refills: 11 | Status: SHIPPED | OUTPATIENT
Start: 2021-07-19 | End: 2022-07-19

## 2021-07-19 RX ORDER — DULAGLUTIDE 1.5 MG/.5ML
1.5 INJECTION, SOLUTION SUBCUTANEOUS WEEKLY
Qty: 4 ML | Refills: 3 | Status: SHIPPED | OUTPATIENT
Start: 2021-07-19 | End: 2021-09-02

## 2021-07-19 RX ORDER — ESCITALOPRAM OXALATE 10 MG/1
10 TABLET ORAL DAILY
Qty: 30 TABLET | Refills: 3 | Status: SHIPPED | OUTPATIENT
Start: 2021-07-19 | End: 2021-12-08

## 2021-07-19 RX ORDER — METOPROLOL SUCCINATE 25 MG/1
50 TABLET, EXTENDED RELEASE ORAL DAILY
Qty: 180 TABLET | Refills: 3 | Status: SHIPPED | OUTPATIENT
Start: 2021-07-19 | End: 2021-09-09

## 2021-07-19 RX ORDER — BENZONATATE 100 MG/1
100 CAPSULE ORAL 3 TIMES DAILY PRN
Qty: 90 CAPSULE | Refills: 1 | Status: SHIPPED | OUTPATIENT
Start: 2021-07-19 | End: 2022-06-21

## 2021-07-19 NOTE — TELEPHONE ENCOUNTER
Rowena    Pt needs refills on the meds cued up, did rec with the home care nurse, Salma  Pt still has a cough and uses the tessalon    Pt was in the ED and Home care is calling to verify doses of a few of the meds  Home care nurse will contact the pain clinic regarding topramax order    Salma JIN, [phone], [facility]    Richelle Tucker RN   [facility]

## 2021-07-19 NOTE — TELEPHONE ENCOUNTER
Pts home health nurse calling to request the topiramate (TOPAMAX) 50 MG tablet be removed from the patient's medication list so they are able to go off the correct dosage and instructions.    Crissy LEWIS    [facility]

## 2021-07-20 NOTE — TELEPHONE ENCOUNTER
Patient now uses the Topamax 200 MG tablets 1 tab twice daily. Topiramate (TOPAMAX) 50 MG tablet is removed from active med list.

## 2021-07-21 DIAGNOSIS — I25.119 CORONARY ARTERY DISEASE INVOLVING NATIVE HEART WITH ANGINA PECTORIS, UNSPECIFIED VESSEL OR LESION TYPE (H): ICD-10-CM

## 2021-07-23 ENCOUNTER — TELEPHONE (OUTPATIENT)
Dept: FAMILY MEDICINE | Facility: CLINIC | Age: 61
End: 2021-07-23

## 2021-07-23 NOTE — TELEPHONE ENCOUNTER
Reason for Call:  Form, our goal is to have forms completed with 72 hours, however, some forms may require a visit or additional information.    Type of letter, form or note:  medical [place] STANDARD CONSENT FORM TO RELEASE HEALTH INFORMATION     Who is the form from?: Home care [facility]    Where did the form come from: MAILED IN    What clinic location was the form placed at?: [facility]    Where the form was placed: [place] Box/Folder    What number is listed as a contact on the form?: [phone] (P)       Additional comments: PT NEED FORMS FILLED OUT AND MAILED BACK TO PATIENTS ADDRESS. [address] [place] [id number]    Call taken on 7/23/2021 at 4:07 PM by Chelsea Chino

## 2021-07-24 NOTE — IP AVS SNAPSHOT
[address] [address]    Phone:  [phone]                                       After Visit Summary   1/14/2018    Nena Tang    MRN: [id number]           After Visit Summary Signature Page     I have received my discharge instructions, and my questions have been answered. I have discussed any challenges I see with this plan with the nurse or doctor.    ..........................................................................................................................................  Patient/Patient Representative Signature      ..........................................................................................................................................  Patient Representative Print Name and Relationship to Patient    ..................................................               ................................................  Date                                            Time    ..........................................................................................................................................  Reviewed by Signature/Title    ...................................................              ..............................................  Date                                                            Time           English

## 2021-07-27 ENCOUNTER — OFFICE VISIT (OUTPATIENT)
Dept: FAMILY MEDICINE | Facility: CLINIC | Age: 61
End: 2021-07-27
Payer: COMMERCIAL

## 2021-07-27 ENCOUNTER — APPOINTMENT (OUTPATIENT)
Dept: LAB | Facility: CLINIC | Age: 61
End: 2021-07-27
Payer: COMMERCIAL

## 2021-07-27 ENCOUNTER — OFFICE VISIT (OUTPATIENT)
Dept: PALLIATIVE MEDICINE | Facility: CLINIC | Age: 61
End: 2021-07-27
Payer: COMMERCIAL

## 2021-07-27 ENCOUNTER — OFFICE VISIT (OUTPATIENT)
Dept: PHARMACY | Facility: CLINIC | Age: 61
End: 2021-07-27
Payer: COMMERCIAL

## 2021-07-27 VITALS — HEART RATE: 104 BPM | OXYGEN SATURATION: 98 % | SYSTOLIC BLOOD PRESSURE: 143 MMHG | DIASTOLIC BLOOD PRESSURE: 94 MMHG

## 2021-07-27 VITALS
BODY MASS INDEX: 41.4 KG/M2 | TEMPERATURE: 97.4 F | DIASTOLIC BLOOD PRESSURE: 72 MMHG | WEIGHT: 212 LBS | HEART RATE: 96 BPM | SYSTOLIC BLOOD PRESSURE: 120 MMHG | OXYGEN SATURATION: 96 %

## 2021-07-27 DIAGNOSIS — M53.3 SI (SACROILIAC) JOINT DYSFUNCTION: ICD-10-CM

## 2021-07-27 DIAGNOSIS — E11.3299 TYPE 2 DIABETES MELLITUS WITH MILD NONPROLIFERATIVE RETINOPATHY WITHOUT MACULAR EDEMA, WITH LONG-TERM CURRENT USE OF INSULIN, UNSPECIFIED LATERALITY (H): Primary | ICD-10-CM

## 2021-07-27 DIAGNOSIS — Z79.4 TYPE 2 DIABETES MELLITUS WITH MILD NONPROLIFERATIVE RETINOPATHY WITHOUT MACULAR EDEMA, WITH LONG-TERM CURRENT USE OF INSULIN, UNSPECIFIED LATERALITY (H): Primary | ICD-10-CM

## 2021-07-27 DIAGNOSIS — R10.13 ABDOMINAL PAIN, EPIGASTRIC: Primary | ICD-10-CM

## 2021-07-27 DIAGNOSIS — E11.65 TYPE 2 DIABETES MELLITUS WITH HYPERGLYCEMIA, WITH LONG-TERM CURRENT USE OF INSULIN (H): ICD-10-CM

## 2021-07-27 DIAGNOSIS — R10.84 ABDOMINAL PAIN, GENERALIZED: ICD-10-CM

## 2021-07-27 DIAGNOSIS — G89.4 CHRONIC PAIN SYNDROME: ICD-10-CM

## 2021-07-27 DIAGNOSIS — I10 HTN, GOAL BELOW 140/90: ICD-10-CM

## 2021-07-27 DIAGNOSIS — M79.7 FIBROMYALGIA: ICD-10-CM

## 2021-07-27 DIAGNOSIS — M47.819 FACET ARTHROPATHY: Primary | ICD-10-CM

## 2021-07-27 DIAGNOSIS — Z79.4 TYPE 2 DIABETES MELLITUS WITH HYPERGLYCEMIA, WITH LONG-TERM CURRENT USE OF INSULIN (H): ICD-10-CM

## 2021-07-27 LAB
ALBUMIN UR-MCNC: NEGATIVE MG/DL
APPEARANCE UR: CLEAR
BACTERIA #/AREA URNS HPF: ABNORMAL /HPF
BASOPHILS # BLD AUTO: 0 10E3/UL (ref 0–0.2)
BASOPHILS NFR BLD AUTO: 1 %
BILIRUB UR QL STRIP: NEGATIVE
COLOR UR AUTO: YELLOW
EOSINOPHIL # BLD AUTO: 0.2 10E3/UL (ref 0–0.7)
EOSINOPHIL NFR BLD AUTO: 4 %
ERYTHROCYTE [DISTWIDTH] IN BLOOD BY AUTOMATED COUNT: 12.8 % (ref 10–15)
GLUCOSE UR STRIP-MCNC: NEGATIVE MG/DL
HBA1C MFR BLD: 9.5 % (ref 0–5.6)
HCT VFR BLD AUTO: 37.1 % (ref 35–47)
HGB BLD-MCNC: 11.7 G/DL (ref 11.7–15.7)
HGB UR QL STRIP: NEGATIVE
IMM GRANULOCYTES # BLD: 0 10E3/UL
IMM GRANULOCYTES NFR BLD: 0 %
KETONES UR STRIP-MCNC: NEGATIVE MG/DL
LEUKOCYTE ESTERASE UR QL STRIP: ABNORMAL
LIPASE SERPL-CCNC: 178 U/L (ref 73–393)
LYMPHOCYTES # BLD AUTO: 2.4 10E3/UL (ref 0.8–5.3)
LYMPHOCYTES NFR BLD AUTO: 45 %
MCH RBC QN AUTO: 31.2 PG (ref 26.5–33)
MCHC RBC AUTO-ENTMCNC: 31.5 G/DL (ref 31.5–36.5)
MCV RBC AUTO: 99 FL (ref 78–100)
MONOCYTES # BLD AUTO: 0.4 10E3/UL (ref 0–1.3)
MONOCYTES NFR BLD AUTO: 7 %
NEUTROPHILS # BLD AUTO: 2.3 10E3/UL (ref 1.6–8.3)
NEUTROPHILS NFR BLD AUTO: 43 %
NITRATE UR QL: NEGATIVE
NRBC # BLD AUTO: 0 10E3/UL
NRBC BLD AUTO-RTO: 0 /100
PH UR STRIP: 5.5 [PH] (ref 5–7)
PLATELET # BLD AUTO: 216 10E3/UL (ref 150–450)
RBC # BLD AUTO: 3.75 10E6/UL (ref 3.8–5.2)
RBC #/AREA URNS AUTO: ABNORMAL /HPF
SP GR UR STRIP: >=1.03 (ref 1–1.03)
SQUAMOUS #/AREA URNS AUTO: ABNORMAL /LPF
UROBILINOGEN UR STRIP-ACNC: 0.2 E.U./DL
WBC # BLD AUTO: 5.4 10E3/UL (ref 4–11)
WBC #/AREA URNS AUTO: ABNORMAL /HPF

## 2021-07-27 PROCEDURE — 81001 URINALYSIS AUTO W/SCOPE: CPT | Performed by: NURSE PRACTITIONER

## 2021-07-27 PROCEDURE — 82043 UR ALBUMIN QUANTITATIVE: CPT | Performed by: NURSE PRACTITIONER

## 2021-07-27 PROCEDURE — 36415 COLL VENOUS BLD VENIPUNCTURE: CPT | Performed by: NURSE PRACTITIONER

## 2021-07-27 PROCEDURE — 99607 MTMS BY PHARM ADDL 15 MIN: CPT | Performed by: PHARMACIST

## 2021-07-27 PROCEDURE — 85025 COMPLETE CBC W/AUTO DIFF WBC: CPT | Performed by: NURSE PRACTITIONER

## 2021-07-27 PROCEDURE — 99213 OFFICE O/P EST LOW 20 MIN: CPT | Performed by: PSYCHIATRY & NEUROLOGY

## 2021-07-27 PROCEDURE — 99606 MTMS BY PHARM EST 15 MIN: CPT | Performed by: PHARMACIST

## 2021-07-27 PROCEDURE — 83690 ASSAY OF LIPASE: CPT | Performed by: NURSE PRACTITIONER

## 2021-07-27 PROCEDURE — 83036 HEMOGLOBIN GLYCOSYLATED A1C: CPT | Performed by: NURSE PRACTITIONER

## 2021-07-27 PROCEDURE — 80053 COMPREHEN METABOLIC PANEL: CPT | Performed by: NURSE PRACTITIONER

## 2021-07-27 PROCEDURE — 82150 ASSAY OF AMYLASE: CPT | Performed by: NURSE PRACTITIONER

## 2021-07-27 PROCEDURE — 99215 OFFICE O/P EST HI 40 MIN: CPT | Performed by: NURSE PRACTITIONER

## 2021-07-27 ASSESSMENT — PAIN SCALES - GENERAL: PAINLEVEL: SEVERE PAIN (6)

## 2021-07-27 NOTE — PROGRESS NOTES
Assessment & Plan     Abdominal pain, epigastric  Worsening with meals, increased bowel movements- diarrhea. We discussed that this could be medication related. Message sent to Dr. Paz about possibility of Topiramate causing her abdominal issues. Blood work pending to rule out other etiologies.   - Amylase; Future  - Lipase; Future  - CBC with platelets and differential; Future  - Comprehensive metabolic panel (BMP + Alb, Alk Phos, ALT, AST, Total. Bili, TP); Future  - UA with Microscopic reflex to Culture - lab collect; Future  - XR Abdomen 2 Views; Future  - Helicobacter pylori Antigen Stool; Future      Type 2 diabetes mellitus with hyperglycemia, with long-term current use of insulin (H)  Uncontrolled diabetes. Working on patient compliance with her medications.   Lab Results   Component Value Date    A1C 9.5 07/27/2021    A1C 9.1 12/01/2020    A1C 9.7 09/15/2020    A1C 8.6 06/30/2020    A1C 6.2 12/03/2019    A1C 6.6 08/06/2019     - Hemoglobin A1c; Future  - Albumin Random Urine Quantitative with Creat Ratio; Future    Chronic pain syndrome  Fibromyalgia  Patient is established with Dr. Paz in the pain clinic to help in managing her pain. Reports some improvements with medication changes and is also scheduled for injections to help with the back pain.     HTN, goal below 140/90  Uncontrolled. Will sometimes have symptoms that are sometimes due to her uncontrolled diabetes. Continue with no changes. Working on medication compliance.   BP Readings from Last 3 Encounters:   08/02/21 (!) 179/93   07/27/21 (!) 143/94   07/27/21 120/72       65 minutes spent on the date of the encounter doing chart review, history and exam, documentation and further activities per the note       See Patient Instructions    Return in about 4 weeks (around 8/24/2021) for Routine Visit.    [facility]    Surjit Barrett is a 61 year old who presents for the  following health issues:    HPI   MTM: Eugenia    ED/UC Followup:  Facility:  [facility] ED   Date of visit: 7/15/2021  Reason for visit: Hyperglycemia  Current Status: Still struggling with managing her diabetes.     Patient has been missing her insulin shots. States that she was depressed about her stomach and not focused on managing her diabetes like she should.      Abdominal pain: Patient reports that her stomach has been bothering her. Reports having diarrhea and constipation. Tylenol has not helpful. States that her stomach pain has been getting worse since she saw us. Reports that when she eats, it makes it worse. Stopped drinking soda for 3 weeks. States that she has had a couple of incidences of black stool, otherwise they have been normal.   Bowel movements about 3-4 times per day and has been decreased to 1 BM after taking a stool softener today. Currently not in pain.     Mental Health Follow up     Status since last visit: Moving into a group home until she finds her own apartment. Will have skilled care at the  in order to get on track with her medical care. She will need to put some things in storage for this move.   Moving into the  with 2 other roommates. But will have more supportive care while there.   Improving mental health with having better understanding of her housing situation and also for her medical health and how to manage these. [place] decision for the move between her and her son, although she is very tearful when she talks about this.     See PHQ-9 for current symptoms.  Other associated symptoms: None    Complicating factors: as noted above.   Significant life event:  No   Current substance abuse:  None  Anxiety or Panic symptoms:  No    Sleep - troubled due to the stomach issues.   Appetite - able to eat 2 meals per day.   Exercise - working on getting back to a fitness program.       PHQ-9  English PHQ-9   Any Language           Diabetes Follow-up  Not eating a lot of food like she  used to. Hurts but getting used it. Not missed any insulin shots since her emergency room visit. Previously missing one dose of Lantus dose daily.   -160. Will work on cutting back on honey for her tea.   Increased water intake and exercise to help.     BP Readings from Last 2 Encounters:   07/27/21 120/72   07/15/21 (!) 159/79     Hemoglobin A1C (%)   Date Value   12/01/2020 9.1 (H)   09/15/2020 9.7 (H)     LDL Cholesterol Calculated (mg/dL)   Date Value   12/01/2020 141 (H)   12/22/2019 86               Hypertension Follow-up  Reports some headaches, and dizziness, no chest pain or shortness of breath.     Problems taking medications regularly: No    Medication side effects: none    Diet: regular (no restrictions)    Social History     Tobacco Use     Smoking status: Never Smoker     Smokeless tobacco: Never Used   Substance Use Topics     Alcohol use: No     Comment: last month        Problem list and histories reviewed & adjusted, as indicated.  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 (H)     Illiterate     Irritable bowel syndrome     overweight - BMI >35     Takotsubo cardiomyopathy     CAD (coronary artery disease)     Restless legs syndrome (RLS)     CINDY (obstructive sleep apnea)- mild AHI 10.3     Verbal auditory hallucination     Chronic low back pain     Schizoaffective disorder, depressive type (H)     Migraine headache     HTN, goal below 140/90     Health Care Home     Lumbago     Cervicalgia     Cocaine abuse, episodic (H)     Suicidal ideation     Esophageal reflux     Mild nonproliferative diabetic retinopathy (H)     Tardive dyskinesia     Alcohol use     Left cataract     Falls frequently     History of uterine cancer     Psychophysiological insomnia     Dysuria      Asymptomatic postmenopausal status     Abdominal pain, right lower quadrant     Infectious encephalopathy     Non-intractable vomiting with nausea     Thoughts of self harm     Gastroenteritis     Posttraumatic stress disorder     Cervical cancer screening     Type 2 diabetes mellitus with stage 3 chronic kidney disease, with long-term current use of insulin (H)     Positive AIDA (antinuclear antibody)     Hyperglycemia     Pneumonia     Depression with suicidal ideation     Mixed incontinence     Chronic pain syndrome     Fibromyalgia     Past Surgical History:   Procedure Laterality Date     C OOPHORECTORMY FOR RAHEL, W/BX  1983    UTERINE     CATARACT IOL, RT/LT Bilateral 2017     CHOLECYSTECTOMY       COLONOSCOPY N/A 3/16/2017    Procedure: COLONOSCOPY;  Surgeon: Traci Gonzalez MD;  Location:  GI     Coronary CTA  5/21/2014     HYSTERECTOMY  1983    uterine cancer yearly pap's per provider.     HYSTERECTOMY       LAPAROSCOPIC CHOLECYSTECTOMY  2008     PHACOEMULSIFICATION CLEAR CORNEA WITH STANDARD INTRAOCULAR LENS IMPLANT Left 5/5/2017    Procedure: PHACOEMULSIFICATION CLEAR CORNEA WITH STANDARD INTRAOCULAR LENS IMPLANT;  LEFT EYE PHACOEMULSIFICATION CLEAR CORNEA WITH STANDARD INTRAOCULAR LENS IMPLANT ;  Surgeon: Tyra Diaz MD;  Location: [facility]     PHACOEMULSIFICATION CLEAR CORNEA WITH STANDARD INTRAOCULAR LENS IMPLANT Right 6/30/2017    Procedure: PHACOEMULSIFICATION CLEAR CORNEA WITH STANDARD INTRAOCULAR LENS IMPLANT;  RIGHT EYE PHACOEMULSIFICATION CLEAR CORNEA WITH STANDARD INTRAOCULAR LENS IMPLANT;  Surgeon: Tyra Diaz MD;  Location:  [facility]     RELEASE TRIGGER FINGER  10/11/2012    Left thumb. Procedure: RELEASE TRIGGER FINGER;  LEFT THUMB TRIGGER RELEASE;  Surgeon: Tay Langley MD;  Location:  [facility]     RELEASE TRIGGER FINGER Right 12/26/2016    Procedure: RELEASE TRIGGER FINGER;  Surgeon: Albino Castñaeda MD;  Location: [facility] OR       Social History     Tobacco Use      Smoking status: Never Smoker     Smokeless tobacco: Never Used   Substance Use Topics     Alcohol use: No     Comment: last month     Family History   Problem Relation Age of Onset     Cancer Mother         BLADDER     Respiratory Mother         COPD     Gastrointestinal Disease Mother         CIRRHOSIS OF LI BOLIVAR     Alcohol/Drug Mother      Diabetes Mother      Hypertension Mother      Lipids Mother      C.A.D. Mother      Glaucoma Mother      Alcohol/Drug Sister      Mental Illness Sister      Alcohol/Drug Sister      Psychotic Disorder Sister      Cancer Maternal Grandmother         UNKNOWN TYPE     Cancer Brother         COLON     Cancer - colorectal Brother         IN HIS LATE 30S     Alcohol/Drug Brother          OF HEROIN OVERDOSE AT AGE 22 YRS     Macular Degeneration No family hx of            Current Outpatient Medications   Medication Sig Dispense Refill     acetaminophen (TYLENOL) 650 MG CR tablet Take 1 tablet (650 mg) by mouth every 8 hours as needed for mild pain or fever 120 tablet 1     alcohol swab prep pads Use to swab area of injection/rodolfo as directed. 100 each 3     amantadine (SYMMETREL) 100 MG capsule Take 1 capsule (100 mg)  in the morning and 2 capsules (200 mg) in the evening. 60 capsule 3     aspirin (ASA) 81 MG EC tablet TAKE 1 TABLET (81MG) BY MOUTH DAILY 90 tablet 1     atorvastatin (LIPITOR) 80 MG tablet TAKE 1 TABLET (80MG) BY MOUTH DAILY 30 tablet 11     benzonatate (TESSALON) 100 MG capsule Take 1 capsule (100 mg) by mouth 3 times daily as needed for cough 90 capsule 1     blood glucose (NO BRAND SPECIFIED) test strip Use to test blood sugar 4 times daily or as directed. 100 strip 11     clonazePAM (KLONOPIN) 0.5 MG tablet Take 1 tablet (0.5 mg) by mouth At Bedtime 30 tablet 0     COMPOUNDED NON-CONTROLLED SUBSTANCE (CMPD RX) - PHARMACY TO MIX COMPOUNDED MEDICATION Please compound viscous lidocaine 2% and maalox in a 1:1 ratio Please take 15 to 30 ml by mouth every 4-6 hours  as needed for abdominal pain 500 mL 0     Continuous Blood Gluc Sensor (FREESTYLE LEBRON 14 DAY SENSOR) MIS 1 Device every 14 days Change sensor as directed every 14 days 2 each 11     diclofenac (VOLTAREN) 1 % topical gel Apply 4 g topically 4 times daily 350 g 1     escitalopram (LEXAPRO) 10 MG tablet Take 1 tablet (10 mg) by mouth daily 30 tablet 3     famotidine (PEPCID) 20 MG tablet Take 1 tablet (20 mg) by mouth daily 90 tablet 3     fluticasone-salmeterol (ADVAIR) 250-50 MCG/DOSE inhaler Inhale 1 puff into the lungs every 12 hours as needed       gabapentin (NEURONTIN) 300 MG capsule Take 1 capsule (300 mg) by mouth At Bedtime 30 capsule 3     hydrOXYzine (VISTARIL) 25 MG capsule Take 1 capsule (25 mg) by mouth every 4 hours as needed for anxiety 60 capsule 3     insulin aspart (NOVOLOG FLEXPEN) 100 UNIT/ML pen Inject 6 Units Subcutaneous 3 times daily (with meals) 12 mL 1     insulin glargine (LANTUS PEN) 100 UNIT/ML pen Inject 35 Units Subcutaneous 2 times daily 12 mL 1     insulin pen needle (NOVOFINE 30) 30G X 8 MM miscellaneous USE 4 DAILY OR AS DIRECTED 400 each 1     ipratropium - albuterol 0.5 mg/2.5 mg/3 mL (DUONEB) 0.5-2.5 (3) MG/3ML neb solution Take 1 vial (3 mLs) by nebulization every 6 hours as needed for shortness of breath / dyspnea or wheezing 30 vial 0     losartan (COZAAR) 100 MG tablet TAKE 1 TABLET (100MG) BY MOUTH DAILY 30 tablet 5     metoprolol succinate ER (TOPROL-XL) 25 MG 24 hr tablet Take 2 tablets (50 mg) by mouth daily 180 tablet 3     nystatin (MYCOSTATIN) 749126 UNIT/GM external powder Apply topically 2 times daily as needed 45 g 1     ondansetron (ZOFRAN) 4 MG tablet Take 1 tablet (4 mg) by mouth every 8 hours as needed for nausea 8 tablet 0     order for DME Equipment being ordered: Depends. 30 each 4     order for DME Equipment being ordered: CPAP Supplies. 1 each 0     paliperidone ER (INVEGA) 9 MG 24 hr tablet Take 1 tablet (9 mg) by mouth At Bedtime 30 tablet 2      pantoprazole (PROTONIX) 40 MG EC tablet Take 1 tablet (40 mg) by mouth daily 30 tablet 3     senna-docusate (SENOKOT-S/PERICOLACE) 8.6-50 MG tablet Take 1 tablet by mouth 2 times daily as needed for constipation 60 tablet 10     sertraline (ZOLOFT) 100 MG tablet Take 200 mg by mouth       thin (NO BRAND SPECIFIED) lancets Use with lanceting device. To accompany: Blood Glucose Monitor Brands: per insurance. 100 each 6     topiramate (TOPAMAX) 200 MG tablet Take 1 tablet (200 mg) by mouth 2 times daily . Titrate to this dose as instructed in clinic. Note change in pill size. 60 tablet 6     traZODone (DESYREL) 100 MG tablet Take 1 tablet (100 mg) by mouth nightly as needed for sleep 30 tablet 3     TRULICITY 1.5 MG/0.5ML pen Inject 1.5 mg Subcutaneous once a week Every Friday 4 mL 3     zinc oxide (DESITIN) 40 % external ointment Apply topically as needed for dry skin or irritation 56 g 0     Allergies   Allergen Reactions     Imidazole Antifungals Hives     Tolerates diflucan     Ketoprofen Itching     Pruritis to topical     Lisinopril Hives     Metformin Other (See Comments)     Patient hospitalized for lactic acidosis - admitting provider suspectd caused by metformin     Metronidazole Hives     Posaconazole Hives     Tolerates diflucan     Recent Labs   Lab Test 07/15/21  1135 06/15/21  2155 06/15/21  0718 06/04/21  1619 12/01/20  1730 09/15/20  1454 06/30/20  1624 12/22/19  0651 09/14/19  1915 08/06/19  1043 05/24/19  0916   A1C  --   --   --   --  9.1* 9.7* 8.6*  --    < > 6.6*  --    LDL  --   --   --   --  141*  --   --  86  --  81  --    HDL  --   --   --   --  46*  --   --  42*  --  39*  --    TRIG  --   --   --   --  141  --   --  132  --  131  --    ALT 25  --  23 29  --  29 35 22  --   --  30   CR 0.88 0.78 0.80 0.80  --  0.85 0.98 0.86  --   --  0.87   GFRESTIMATED 72 83 79 80  --  74 63 74  --   --  73   GFRESTBLACK  --  >90 >90 >90  --  86 73 85  --   --  85   POTASSIUM 3.4 3.4 3.5 4.6  --  4.4 4.3 4.1   --   --  4.0   TSH  --   --   --   --   --   --   --  1.23  --   --  1.62    < > = values in this interval not displayed.      BP Readings from Last 3 Encounters:   07/27/21 120/72   07/15/21 (!) 159/79   06/29/21 100/70    Wt Readings from Last 3 Encounters:   07/27/21 96.2 kg (212 lb)   06/29/21 98 kg (216 lb)   06/21/21 98 kg (216 lb)          Labs reviewed in EPIC  Problem list, Medication list, Allergies, and Medical/Social/Surgical histories reviewed in [facility] and updated as appropriate.      ROS: Constitutional, neuro, ENT, endocrine, pulmonary, cardiac, gastrointestinal, genitourinary, musculoskeletal, integument and psychiatric systems are negative, except as otherwise noted above in the HPI.    OBJECTIVE:                                                    /72   Pulse 96   Temp 97.4  F (36.3  C) (Temporal)   Wt 96.2 kg (212 lb)   LMP 01/06/2015 (Exact Date)   SpO2 96%   BMI 41.40 kg/m    Body mass index is 41.4 kg/m .    GENERAL: healthy, alert and no distress  EYES: Eyes grossly normal to inspection, PERRL and conjunctivae and sclerae normal  NECK: no adenopathy, no asymmetry, masses, or scars and thyroid normal to palpation  RESP: lungs clear to auscultation - no rales, rhonchi or wheezes  CV: regular rate and rhythm, normal S1 S2, no S3 or S4, no murmur, click or rub, no peripheral edema and peripheral pulses strong  ABDOMEN: soft, nontender, no hepatosplenomegaly, no masses and bowel sounds normal  MS: no gross musculoskeletal defects noted, no edema  NEURO: Normal strength and tone, mentation intact and speech normal  PSYCH: mentation appears normal, affect normal/bright    Mental Status Assessment:  Appearance:   Appropriate   Eye Contact:   Good   Psychomotor Behavior: Normal   Attitude:   Cooperative   Orientation:   All  Speech   Rate / Production: Normal    Volume:  Normal   Mood:    Normal  Affect:    Appropriate   Thought Content:  Clear   Thought Form:  Coherent  Logical    Insight:    Fair   Attention Span and Concentration:   Recent and Remote Memory:  intact  Fund of Knowledge: appropriate  Muscle Strength and Tone: normal   Suicidal Ideation: reports no thoughts, no intention  Diagnostic Test Results:  Labs reviewed in Epic

## 2021-07-27 NOTE — PATIENT INSTRUCTIONS
1. Stop the voltaren gel.    2. Schedule SI joint and facet joint injection for the right side.   [phone]  3. Schedule follow up in 2 months.    ----------------------------------------------------------------  Clinic Number:  [phone]     Call with any questions about your care and for scheduling assistance.     Calls are returned Monday through Friday between 8 AM and 4:30 PM. We usually get back to you within 2 business days depending on the issue/request.    If we are prescribing your medications:    For opioid medication refills, call the clinic or send a [employer] message 7 days in advance.  Please include:    Name of requested medication    Name of the pharmacy.    For non-opioid medications, call your pharmacy directly to request a refill. Please allow 3-4 days to be processed.     Per [place] State Law:    All controlled substance prescriptions must be filled within 30 days of being written.      For those controlled substances allowing refills, pickup must occur within 30 days of last fill.      We believe regular attendance is key to your success in our program!      Any time you are unable to keep your appointment we ask that you call us at least 24 hours in advance to cancel.This will allow us to offer the appointment time to another patient.     Multiple missed appointments may lead to dismissal from the clinic.

## 2021-07-27 NOTE — PATIENT INSTRUCTIONS
- Work on cutting out honey.   - Carry your diabetes supplies with you.   - Abdominal X-ray and blood work for the stomach pain.   - Exercise and increase drinking water to help with managing high blood sugars.   - Call clinic with any questions or concerns.

## 2021-07-27 NOTE — Clinical Note
Hey, I think the Topiramate might be causing her some GI issues. Am running some blood work to rule out other concerns. Thanks!  Rowena.

## 2021-07-27 NOTE — PROGRESS NOTES
[facility]    Date of visit: 7/27/2021     Chief complaint:   Chief Complaint   Patient presents with     Pain      Interval history:  Nena Tang was last seen by me on 6/8/21    Recommendations/plan at the last visit included:  1. Physical Therapy: continue HEP  2. Pain Psychologist to address issues of relaxation, behavioral change, coping style, and other factors important to improvement: patient agreeable to try to participate with pain psych.- yes, has been following with Dr. Kohler pain psychologist and Dr. Brothers psychiatrist for depression  3. Diagnostic Studies: none  4. Urine toxicology screen: none   5. Medication Management: Titration of topamax provided.  Goal is 200mg twice daily  6. Further procedures recommended: right SI joint injection after infection  7. Other treatments: none  8. Recommendations/follow-up for PCP:  none  9. Follow up: 6-8 weeks     Since her last visit, Nena Tang reports  She is having more pain in the right low back  Had UTI and was on antibiotics.  -increased topamax to 200mg twice daily. Hard to know if helpful.  No weakness, no b/b  -not able to do much due to pain- she goes to the drop in center and can't do much due to pain.    Pain scores:  Severe Pain (6)     Current pain medications include:  - Gabapentin 300 mg at bedtime -  sedation so concerned about adding in daytime doses.  - Topamax 200 BID - no side effects. helpful      Previous medication treatments included:  - Tylenol 650 mg prn - no benefit  - Diclofenac gel - does not take   - Flexeril - no benefit  - Robaxin (methocarbamol) 1000mg dose- not helpful  - Diclofenac gel QID- not using    Other treatments have included:  Nena Tang has not been seen at a pain clinic in the past.    PT: yes, last session 6/2019 outpatient and most recent home PT a couple of months ago 3-4 sessions  Pain Psych: yes   Acupuncture: none  Chiropractor: none  TENs Unit:  none  Injections:    - shoulder injection does not remember when - no benefit   - Right SIJ and Right GTB injection on 4/5/2021    Side Effects: None    Medications:  Current Outpatient Medications   Medication Sig Dispense Refill     acetaminophen (TYLENOL) 650 MG CR tablet Take 1 tablet (650 mg) by mouth every 8 hours as needed for mild pain or fever 120 tablet 1     alcohol swab prep pads Use to swab area of injection/rodolfo as directed. 100 each 3     amantadine (SYMMETREL) 100 MG capsule Take 1 capsule (100 mg)  in the morning and 2 capsules (200 mg) in the evening. 60 capsule 3     aspirin (ASA) 81 MG EC tablet TAKE 1 TABLET (81MG) BY MOUTH DAILY 90 tablet 1     atorvastatin (LIPITOR) 80 MG tablet TAKE 1 TABLET (80MG) BY MOUTH DAILY 30 tablet 11     benzonatate (TESSALON) 100 MG capsule Take 1 capsule (100 mg) by mouth 3 times daily as needed for cough 90 capsule 1     blood glucose (NO BRAND SPECIFIED) test strip Use to test blood sugar 4 times daily or as directed. 100 strip 11     clonazePAM (KLONOPIN) 0.5 MG tablet Take 1 tablet (0.5 mg) by mouth At Bedtime 30 tablet 0     COMPOUNDED NON-CONTROLLED SUBSTANCE (CMPD RX) - PHARMACY TO MIX COMPOUNDED MEDICATION Please compound viscous lidocaine 2% and maalox in a 1:1 ratio Please take 15 to 30 ml by mouth every 4-6 hours as needed for abdominal pain 500 mL 0     Continuous Blood Gluc Sensor (FREESTYLE LEBRON 14 DAY SENSOR) MISC 1 Device every 14 days Change sensor as directed every 14 days 2 each 11     escitalopram (LEXAPRO) 10 MG tablet Take 1 tablet (10 mg) by mouth daily 30 tablet 3     famotidine (PEPCID) 20 MG tablet Take 1 tablet (20 mg) by mouth daily 90 tablet 3     fluticasone-salmeterol (ADVAIR) 250-50 MCG/DOSE inhaler Inhale 1 puff into the lungs every 12 hours as needed       gabapentin (NEURONTIN) 300 MG capsule Take 1 capsule (300 mg) by mouth At Bedtime 30 capsule 3     hydrOXYzine (VISTARIL) 25 MG capsule Take 1 capsule (25 mg) by mouth every 4  hours as needed for anxiety 60 capsule 3     insulin aspart (NOVOLOG FLEXPEN) 100 UNIT/ML pen Inject 6 Units Subcutaneous 3 times daily (with meals) 12 mL 1     insulin glargine (LANTUS PEN) 100 UNIT/ML pen Inject 35 Units Subcutaneous 2 times daily 12 mL 1     insulin pen needle (NOVOFINE 30) 30G X 8 MM miscellaneous USE 4 DAILY OR AS DIRECTED 400 each 1     ipratropium - albuterol 0.5 mg/2.5 mg/3 mL (DUONEB) 0.5-2.5 (3) MG/3ML neb solution Take 1 vial (3 mLs) by nebulization every 6 hours as needed for shortness of breath / dyspnea or wheezing 30 vial 0     losartan (COZAAR) 100 MG tablet TAKE 1 TABLET (100MG) BY MOUTH DAILY 30 tablet 5     metoprolol succinate ER (TOPROL-XL) 25 MG 24 hr tablet Take 2 tablets (50 mg) by mouth daily 180 tablet 3     nystatin (MYCOSTATIN) 073424 UNIT/GM external powder Apply topically 2 times daily as needed 45 g 1     ondansetron (ZOFRAN) 4 MG tablet Take 1 tablet (4 mg) by mouth every 8 hours as needed for nausea 8 tablet 0     order for DME Equipment being ordered: Depends. 30 each 4     order for DME Equipment being ordered: CPAP Supplies. 1 each 0     paliperidone ER (INVEGA) 9 MG 24 hr tablet Take 1 tablet (9 mg) by mouth At Bedtime 30 tablet 2     pantoprazole (PROTONIX) 40 MG EC tablet Take 1 tablet (40 mg) by mouth daily 30 tablet 3     senna-docusate (SENOKOT-S/PERICOLACE) 8.6-50 MG tablet Take 1 tablet by mouth 2 times daily as needed for constipation 60 tablet 10     sertraline (ZOLOFT) 100 MG tablet Take 200 mg by mouth       thin (NO BRAND SPECIFIED) lancets Use with lanceting device. To accompany: Blood Glucose Monitor Brands: per insurance. 100 each 6     topiramate (TOPAMAX) 200 MG tablet Take 1 tablet (200 mg) by mouth 2 times daily . Titrate to this dose as instructed in clinic. Note change in pill size. 60 tablet 6     traZODone (DESYREL) 100 MG tablet Take 1 tablet (100 mg) by mouth nightly as needed for sleep 30 tablet 3     TRULICITY 1.5 MG/0.5ML pen Inject  1.5 mg Subcutaneous once a week Every Friday 4 mL 3     zinc oxide (DESITIN) 40 % external ointment Apply topically as needed for dry skin or irritation 56 g 0       Medical History: any changes in medical history since they were last seen? No    Physical Exam:  Vitals:    03/30/21 1544   BP: 122/76   Pulse: 94   SpO2: 98%     Constitutional: Alert, no distress.   Head: normocephalic. Atraumatic.     Musculoskeletal exam:  Gait/Station/Posture:  upright, nonantalgic. Wide based.  Cervical spine: Limited range of motion    Lumbar spine: limited range of motion, pain with extension, extension/rotation both right and left, but painful on the right     Myofascial tenderness: Generalized tenderness to palpation throughout the lumbar paraspinal musculature     SIJ: Significant tenderness to palpation to PSIS bilaterally right          Assessment:   1.  Widespread pain, she does meet 2010 fibromyalgia diagnostic criteria. Patient continues to endorse generalized tenderness, soreness and pain to low back, upper and lower extremities and shoulders. Currently on gabapentin, however unable to tolerate sedation SE and therefore only taking it at night. Would benefit from something less sedating.  2. Chronic low back pain- SI joint, likely some facet arthropathy as well.  3. Deconditioning -she has lost some weight, most likely due to current topiramate use.  However, patient unable to perform minimal tasks due to poor exercise tolerance      Plan:  1. Physical Therapy: continue HEP  2. Pain Psychologist to address issues of relaxation, behavioral change, coping style, and other factors important to improvement: patient agreeable to try to participate with pain psych.- yes, has been following with Dr. Kohler pain psychologist and Dr. Brothers psychiatrist for depression  3. Diagnostic Studies: none  4. Urine toxicology screen: none   5. Medication Management:   1. No changes  2. Discussed stopping gabapentin as she isn't sure if  helpful and dose isn't likely high enough to be helpful for pain. She doesn't want to at this time- would like to reassess in the future  6. Further procedures recommended: right SI joint injection, right L5-S1 facet joint injection  7. Other treatments: none  8. Recommendations/follow-up for PCP:  none  9. Follow up: 8 weeks     20 minutes spent on the date of encounter doing chart review, history, and exam documentation and further activities as noted above.    Shell Paz MD  [facility]

## 2021-07-27 NOTE — PROGRESS NOTES
"Medication Therapy Management (MTM) Encounter    ASSESSMENT:                            Medication Adherence/Access: See below for considerations    Stomach pain: see PCP notes for workup.  Concerned abdominal pain may be associated with high dose of topiramate, as symptoms seem to be correlated with dose escalation - consider dose decrease.     Type 2 Diabetes: Patient is not meeting A1c goal of < 7%. Patient would benefit from continuing to work on reducing sugar intake and adherence to insulins.       PLAN:                            1. Stop drinking tea with honey. Bring Novolog to drop in center.  2. Consider dose decrease of topiramate to 300mg/day - PCP outreach to pain specialist    Follow-up: Return in about 4 weeks (around 8/24/2021) for MTM covisit.      SUBJECTIVE/OBJECTIVE:                          Nena Tang is a 61 year old female coming in for a follow-up visit. She was referred to me from Rowena Haas. Today's visit is a co-visit with PCP. Today's visit is a follow-up MTM visit from 6/29/21.     Reason for visit: medication recheck.    Allergies/ADRs: Reviewed in chart  Past Medical History: Reviewed in chart  Tobacco: She reports that she has never smoked. She has never used smokeless tobacco.  Alcohol: not currently using    Medication Adherence/Access:   No concerns about pills but missing Novolog injections, some Lantus (see below- improved since 7/15 ED visit).   [phone] HCRN [place]  She has decided to go into a group home again with nursing assistance, moving in August/September and wants to focus on diabetes.    Stomach pain: has been having severe stomach pain for the past month, getting worse over time. Hurts all day, worse when she eats. BM 3-4+ times a day previously when stomach pain is severe, now more constipated and took a stool softener, BM \"all day yesterday\".    Stopped drinking soda x2-3 weeks, no change.   Stomach today isn't hurting much. She had been skipping " some of her medication and insulin when stomach pain was severe.   She continues to take pantoprazole 40mg daily and famotidine 20mg daily.     Type 2 Diabetes: Pt currently prescribed Lantus 35u daily twice a day(had missed one dose every day prior to ED visit but has improved, no missed doses since ED), Novolog 7u with twice a day every meal (like Lantus, had been missing doses but improved since ED. However she does not take Novolog to the drop in center) , Trulicity 1.5mg weekly (taking). Pt is not experiencing side effects.    SMBG: CGM.       Hypoglycemia: none   Recent symptoms of high blood sugar? blurry vision. ED visit 7/15.   Eye exam: due  Foot exam: up to date  ACEi/ARB: losartan   Urine Albumin:   Lab Results   Component Value Date    MICROL 7 09/15/2020   Aspirin: Taking 81mg daily and denies side effects  Diet/Exercise: eats twice a day  Gave up pop. However at [facility] in [place] she is having eggs and tea with honey.   Lab Results   Component Value Date    A1C 9.1 12/01/2020    A1C 9.7 09/15/2020    A1C 8.6 06/30/2020    A1C 6.2 12/03/2019    A1C 6.6 08/06/2019        Today's Vitals: LMP 01/06/2015 (Exact Date)    BP Readings from Last 1 Encounters:   07/27/21 (!) 143/94     Pulse Readings from Last 1 Encounters:   07/27/21 104     Wt Readings from Last 1 Encounters:   07/27/21 212 lb (96.2 kg)     Ht Readings from Last 1 Encounters:   06/21/21 5' (1.524 m)     Estimated body mass index is 41.4 kg/m  as calculated from the following:    Height as of 6/21/21: 5' (1.524 m).    Weight as of an earlier encounter on 7/27/21: 212 lb (96.2 kg).    Temp Readings from Last 1 Encounters:   07/27/21 97.4  F (36.3  C) (Temporal)      ----------------      I spent 50 minutes with this patient today. All changes were made via collaborative practice agreement with Rowena Haas A copy of the visit note was provided to the patient's primary care provider.    The patient was given a summary of these recommendations.  See Provider note/AVS from today.     Eugenia De Jesus, PharmD, [facility]      Medication Therapy Recommendations  Chronic pain syndrome    Current Medication: topiramate (TOPAMAX) 200 MG tablet   Rationale: Undesirable effect - Adverse medication event - Safety   Recommendation: Decrease Dose   Status: Contact Provider - Awaiting Response

## 2021-07-28 ENCOUNTER — TELEPHONE (OUTPATIENT)
Dept: PALLIATIVE MEDICINE | Facility: CLINIC | Age: 61
End: 2021-07-28

## 2021-07-28 LAB
ALBUMIN SERPL-MCNC: 3.5 G/DL (ref 3.4–5)
ALP SERPL-CCNC: 103 U/L (ref 40–150)
ALT SERPL W P-5'-P-CCNC: 30 U/L (ref 0–50)
AMYLASE SERPL-CCNC: 68 U/L (ref 30–110)
ANION GAP SERPL CALCULATED.3IONS-SCNC: 11 MMOL/L (ref 3–14)
AST SERPL W P-5'-P-CCNC: 16 U/L (ref 0–45)
BILIRUB SERPL-MCNC: 0.3 MG/DL (ref 0.2–1.3)
BUN SERPL-MCNC: 13 MG/DL (ref 7–30)
CALCIUM SERPL-MCNC: 9.6 MG/DL (ref 8.5–10.1)
CHLORIDE BLD-SCNC: 109 MMOL/L (ref 94–109)
CO2 SERPL-SCNC: 21 MMOL/L (ref 20–32)
CREAT SERPL-MCNC: 0.99 MG/DL (ref 0.52–1.04)
CREAT UR-MCNC: 149 MG/DL
GFR SERPL CREATININE-BSD FRML MDRD: 62 ML/MIN/1.73M2
GLUCOSE BLD-MCNC: 125 MG/DL (ref 70–99)
MICROALBUMIN UR-MCNC: 14 MG/DL
MICROALBUMIN/CREAT UR: 9.4 MG/G CR (ref 0–25)
POTASSIUM BLD-SCNC: 3.8 MMOL/L (ref 3.4–5.3)
PROT SERPL-MCNC: 7.5 G/DL (ref 6.8–8.8)
SODIUM SERPL-SCNC: 141 MMOL/L (ref 133–144)

## 2021-07-28 NOTE — TELEPHONE ENCOUNTER
Screening Questions for Radiology Injections:    Injection to be done at which interventional clinic site? [facility]    If [facility] location, tell patient that this procedure requires a COVID-19 lab test be done within 4 days of the procedure. Would you still like to move forward with scheduling the procedure?  Not Applicable   If YES, let patient know that someone will call them to schedule the COVID-19 test and that they will only receive a call back if the result is positive. Route to nursing to enter order.     Instruct patient to arrive as directed prior to the scheduled appointment time:    [place] minutes before      [place]: 30 minutes before; if IV needed 1 hour before     Procedure ordered by Brandi    Procedure ordered? right SI joint, right L5/S1 facet joint injection      Transforaminal Cervical FREDERICK - no pain provider currently performing    As a reminder, receiving steroids can decrease your body's ability to fight infection.   Would you still like to move forward with scheduling the injection?  Yes    What insurance would patient like us to bill for this procedure? [employer]      Worker's comp or MVA (motor vehicle accident) -Any injection DO NOT SCHEDULE and route to Karma Gill.      [employer] insurance - For SI joint injections, DO NOT SCHEDULE and route Karma Gill.       ALL BCBS, [employer] and [employer]-Route to Karma for review DO NOT SCHEDULE      IF SCHEDULING IN [place] AND NEEDS A PA, IT IS OKAY TO SCHEDULE. [place] HANDLES THEIR OWN PA'S AFTER THE PATIENT IS SCHEDULED. PLEASE SCHEDULE AT LEAST 1 WEEK OUT SO A PA CAN BE OBTAINED.    Any chance of pregnancy? NO   If YES, do NOT schedule and route to RN pool    Is an  needed? No     Patient has a drive home? (mandatory) YES: INFORMED    Is patient taking any blood thinners (i.e. plavix, coumadin, jantoven, warfarin, heparin, pradaxa or dabigatran, etc)? No   If hold needed, do NOT  schedule, route to RN pool     Is patient taking any aspirin products (includes Excedrin and Fiorinal)? No     If more than 325mg/day, OK to schedule; Instruct pt to decrease to less than 325 mg for 7 days AND route to RN pool    For CERVICAL procedures, hold all aspirin products for 6 days.     Tell pt that if aspirin product is not held for 6 days, the procedure WILL BE cancelled.      Does the patient have a bleeding or clotting disorder? No     If YES, okay to schedule AND route to RN nurse pool    For any patients with platelet count <100, must be forwarded to provider    Any allergies to contrast dye, iodine, shellfish, or numbing and steroid medications? No    If YES, add allergy information to appointment notes AND route to the RN pool     If FREDERICK and Contrast Dye / Iodine Allergy? DO NOT SCHEDULE, route to RN pool    Allergies: Imidazole antifungals, Ketoprofen, Lisinopril, Metformin, Metronidazole, and Posaconazole     Is patient diabetic?  Yes  If YES, instruct them to bring their glucometer.    Does patient have an active infection or treated for one within the past week? No     Is patient currently taking any antibiotics?  No     For patients on chronic, preventative, or prophylactic antibiotics, procedures may be scheduled.     For patients on antibiotics for active or recent infection:antibiotic course must have been completed for 4 days    Is patient currently taking any steroid medications? (i.e. Prednisone, Medrol)  No     For patients on steroid medications, course must have been completed for 4 days    Is patient actively being treated for cancer or immunocompromised? No  If YES, do NOT schedule and route to RN pool     Are you able to get on and off an exam table with minimal or no assistance? Yes  If NO, do NOT schedule and route to RN pool    Are you able to roll over and lay on your stomach with minimal or no assistance? Yes  If NO, do NOT schedule and route to RN pool     Has the patient had  a flu shot or any other vaccinations within 7 days before or after the procedure.  No     Have you recently had a COVID vaccine or have plans to get it in the near future? No    If yes, explain that for the vaccine to work best they need to:       wait 1 week before and 1 week after getting Vaccine #1    wait 1 week before and 2 weeks after getting Vaccine #2    If patient has concerns about the timing, send to RN pool     Does patient have an MRI/CT?  YES: 2020  Check Procedure Scheduling Grid to see if required.      Was the MRI done within the last 3 years?  Yes    If yes, where was the MRI done i.e.[facility], [facility], [facility], [facility] etc? [facility]      If no, do not schedule and route to RN pool    If MRI was not done at [facility], [facility] or [facility] do NOT schedule and route to RN pool.      If pt has an imaging disc, the injection MAY be scheduled but pt has to bring disc to appt.     If they show up without the disc the injection cannot be done    Procedure Specific Instructions:      If celiac plexus block, informed patient NPO for 6 hours and that it is okay to take medications with sips of water, especially blood pressure medications  Not Applicable         If this is for a cervical procedure, informed patient that aspirin needs to be held for 6 days.   Not Applicable      If IV needed:    Do not schedule procedures requiring IV placement in the first appointment of the day or first appointment after lunch. Do NOT schedule at 0745, 0815 or 1245.     Instructed pt to arrive 30 minutes early for IV start if required. (Check Procedure Scheduling Grid)  Not Applicable    Reminders:      If you are started on any steroids or antibiotics between now and your appointment, you must contact us because the procedure may need to be cancelled.  Yes      For all procedures except radiofrequency ablations (RFAs) and spinal cord stimulator (SCS) trials, informed patient:    IV sedation is not provided for  this procedure.  If you feel that an oral anti-anxiety medication is needed, you can discuss this further with your referring provider or primary care provider.  The Pain Clinic provider will discuss specifics of what the procedure includes at your appointment.  Most procedures last 10-20 minutes.  We use numbing medications to help with any discomfort during the procedure.  Not Applicable      For patients 85 or older we recommend having an adult stay w/ them for the remainder of the day.       Does the patient have any questions?  NO  Crissy Beard  [facility]

## 2021-07-29 ENCOUNTER — OFFICE VISIT (OUTPATIENT)
Dept: BEHAVIORAL HEALTH | Facility: CLINIC | Age: 61
End: 2021-07-29
Payer: COMMERCIAL

## 2021-07-29 DIAGNOSIS — F25.1 SCHIZOAFFECTIVE DISORDER, DEPRESSIVE TYPE (H): Primary | ICD-10-CM

## 2021-07-29 PROCEDURE — 90834 PSYTX W PT 45 MINUTES: CPT

## 2021-07-29 ASSESSMENT — COLUMBIA-SUICIDE SEVERITY RATING SCALE - C-SSRS
FIRST ATTEMPT DATE: 62974
5. HAVE YOU STARTED TO WORK OUT OR WORKED OUT THE DETAILS OF HOW TO KILL YOURSELF? DO YOU INTEND TO CARRY OUT THIS PLAN?: YES
TOTAL  NUMBER OF ABORTED OR SELF INTERRUPTED ATTEMPTS PAST LIFETIME: NO
TOTAL  NUMBER OF INTERRUPTED ATTEMPTS PAST 3 MONTHS: NO
1. IN THE PAST MONTH, HAVE YOU WISHED YOU WERE DEAD OR WISHED YOU COULD GO TO SLEEP AND NOT WAKE UP?: YES
ATTEMPT PAST THREE MONTHS: NO
2. HAVE YOU ACTUALLY HAD ANY THOUGHTS OF KILLING YOURSELF LIFETIME?: YES
6. HAVE YOU EVER DONE ANYTHING, STARTED TO DO ANYTHING, OR PREPARED TO DO ANYTHING TO END YOUR LIFE?: NO
1. IN THE PAST MONTH, HAVE YOU WISHED YOU WERE DEAD OR WISHED YOU COULD GO TO SLEEP AND NOT WAKE UP?: NO
2. HAVE YOU ACTUALLY HAD ANY THOUGHTS OF KILLING YOURSELF?: NO
ATTEMPT LIFETIME: YES
TOTAL  NUMBER OF ABORTED OR SELF INTERRUPTED ATTEMPTS PAST 3 MONTHS: NO
6. HAVE YOU EVER DONE ANYTHING, STARTED TO DO ANYTHING, OR PREPARED TO DO ANYTHING TO END YOUR LIFE?: NO
5. HAVE YOU STARTED TO WORK OUT OR WORKED OUT THE DETAILS OF HOW TO KILL YOURSELF? DO YOU INTEND TO CARRY OUT THIS PLAN?: NO
TOTAL  NUMBER OF ACTUAL ATTEMPTS LIFETIME: 3
TOTAL  NUMBER OF INTERRUPTED ATTEMPTS LIFETIME: YES
4. HAVE YOU HAD THESE THOUGHTS AND HAD SOME INTENTION OF ACTING ON THEM?: NO
LETHALITY/MEDICAL DAMAGE CODE MOST RECENT ACTUAL ATTEMPT: MODERATE PHYSICAL DAMAGE, MEDICAL ATTENTION NEEDED
3. HAVE YOU BEEN THINKING ABOUT HOW YOU MIGHT KILL YOURSELF?: YES
TOTAL  NUMBER OF INTERRUPTED ATTEMPTS PAST 3 MONTHS: 1
REASONS FOR IDEATION LIFETIME: COMPLETELY TO END OR STOP THE PAIN (YOU COULDN'T GO ON LIVING WITH THE PAIN OR HOW YOU WERE FEELING)
4. HAVE YOU HAD THESE THOUGHTS AND HAD SOME INTENTION OF ACTING ON THEM?: YES
LETHALITY/MEDICAL DAMAGE CODE MOST LETHAL ACTUAL ATTEMPT: MODERATE PHYSICAL DAMAGE, MEDICAL ATTENTION NEEDED
LETHALITY/MEDICAL DAMAGE CODE FIRST ACTUAL ATTEMPT: MODERATE PHYSICAL DAMAGE, MEDICAL ATTENTION NEEDED
MOST LETHAL DATE: 63431
MOST RECENT DATE: 65348

## 2021-07-29 NOTE — PROGRESS NOTES
[facility] Primary Care: : Integrated Behavioral Health  July 29, 2021    Behavioral Health Clinician Progress Note    Patient Name: Nena Tang           Service Type:  Individual      Service Location:   Face to Face in Clinic     Session Start Time: 2:00 pm  Session End Time:  2:52 pm      Session Length: 38 - 52      Attendees: Patient    Visit Activities (Refresh list every visit): [facility] Only     Diagnostic Assessment Date: 1/23/18, Updated 12/12/19    Treatment Plan Review Date: 10/29/21   CGI: 7/29/21    See Flowsheets for today's PHQ-9 and TAMIA-7 results  Previous PHQ-9:   PHQ-9 SCORE 10/26/2018 5/7/2019 12/1/2020   PHQ-9 Total Score - - -   PHQ-9 Total Score - - -   PHQ-9 Total Score 20 22 13     Previous TAMIA-7:   TAMIA-7 SCORE 2/3/2017 3/13/2018 10/26/2018   Total Score - - -   Total Score 0 17 19   Total Score - - -       ALEXANDRA LEVEL:  ALEXANDRA Score (Last Two) 8/30/2011 6/9/2014   ALEXANDRA Raw Score 42 37   Activation Score 66 49.9   ALEXANDRA Level 3 2       DATA  Extended Session (60+ minutes): No  Interactive Complexity: No  Crisis: No  Merged with [facility] Patient: No    Treatment Objective(s) Addressed in This Session:  Target Behavior(s): disease management/lifestyle changes mental health    Depressed Mood: Increase interest, engagement, and pleasure in doing things  Adjustment Difficulties: will develop coping/problem-solving skills to facilitate more adaptive adjustment    Current Stressors / Issues:  [facility] visit with patient in the clinic. Patient reports overall she is doing well. She is preparing to move into a group home. Her sister is moving into a smaller apartment so she is no longer able to live with her. Patient states she is planning to stay at the group home for a 6 months, until she able to find a place of her own. Patient is feeling positive about the move. She was able to tour the facility beforehand. She is looking forward to have access to the home's resources including nursing. Patient  states she's like to work on  establishing healthy habits and learning strategies to manage her diabetes so she can be successful when she lives on her own. Patient reports stable mood. She denies SI/SIB.     Completed [place]-Suicide Severity Rating Scale. Created a safety plan with patient, see below. Provided patient with a copy of the plan.     Progress on Treatment Objective(s) / Homework:  Minimal progress - PREPARATION (Decided to change - considering how); Intervened by negotiating a change plan and determining options / strategies for behavior change, identifying triggers, exploring social supports, and working towards setting a date to begin behavior change    Motivational Interviewing    MI Intervention: Expressed Empathy/Understanding, Supported Autonomy, Collaboration, Evocation, Open-ended questions and Reflections: simple and complex     Change Talk Expressed by the Patient: Desire to change Ability to change Reasons to change Need to change Committment to change    Provider Response to Change Talk: E - Evoked more info from patient about behavior change, A - Affirmed patient's thoughts, decisions, or attempts at behavior change, R - Reflected patient's change talk and S - Summarized patient's change talk statements      Care Plan review completed: No    Medication Review:  No changes to current psychiatric medication(s)     Medication Compliance:  Yes    Changes in Health Issues:   None reported     Chemical Use Review:   Substance Use: Chemical use reviewed, no active concerns identified      Tobacco Use: No current tobacco use.      Assessment: Current Emotional / Mental Status (status of significant symptoms):  Risk status (Self / Other harm or suicidal ideation)  Patient has had a history of suicidal ideation: pt reports hx of SI, severe episodes following the death of her , mother, and sister and suicide attempts: 3 attempted overdose on insulin   Patient denies current fears or concerns  for personal safety.  Patient denies current or recent suicidal ideation or behaviors.  Patient denies current or recent homicidal ideation or behaviors.  Patient denies current or recent self injurious behavior or ideation.  Patient denies other safety concerns.  A safety and risk management plan has been developed including: Patient consented to co-developed safety plan.  A safety and risk management plan was completed.  Patient agreed to use safety plan should any safety concerns arise.  A copy was given to the patient.    Appearance:   Appropriate   Eye Contact:   Good   Psychomotor Behavior: Normal   Attitude:   Cooperative   Orientation:   All  Speech   Rate / Production: Normal    Volume:  Normal   Mood:    Normal  Affect:    Appropriate  Subdued   Thought Content:  Clear   Thought Form:  Coherent  Goal Directed  Logical   Insight:    Fair     Diagnoses:  1. Schizoaffective disorder, depressive type (H)        Collateral Reports Completed:  Not Applicable    Plan: (Homework, other):  Patient was given information about behavioral services and encouraged to schedule a follow up appointment with the clinic [facility] in 3 weeks.  She was also given information about mental health symptoms and treatment options .  CD Recommendations: Maintain Sobriety.   TWILA Quinn  Reviewed/Supervised by: BIN Lee   This note has been reviewed and I agree with the plan of care. This note is co-signed by LORRAINE Lee LICSW, Supervisor, on: 8/11/21                                                   [facility] Primary Care Treatment Plan    Client's Name: Nena Tang  YOB: 1960    Date: 7/29/21    DSM5 Diagnoses: (Sustained by DSM5 Criteria Listed Above)  Diagnoses:  295.70 (F25.0), Schizoaffective disorder, depressive type; 309.81 (F43.10) Posttraumatic Stress Disorder, history of polysubstance use  Psychosocial & Contextual Factors: moving into a group home, strong family support,  diabetes      Referral / Collaboration:  Referral to another professional/service is not indicated at this time.    Anticipated number of session or this episode of care: 3-5      Measurable Treatment Goal(s) related to diagnosis / functional impairment(s)  Goal 1: Client will improve her ability to manage her health needs.     Objective #A (Client Action)    Client will Increase interest, engagement, and pleasure in doing things  Decrease frequency and intensity of feeling down, depressed, hopeless  Improve diet, appetite, mindful eating, and / or meal planning  Identify negative self-talk and behaviors: challenge core beliefs, myths, and actions  Improve management of diabetes.  Status: New - Date: 7/29/21   Intervention(s)  Therapist will teach emotional regulation and goal setting skills using solution focused therapy.      Client has reviewed and agreed to the above plan.      TWILA Quinn  7/29/21           Integrated Behavioral Health Services                                      Patient's Name: Nena Tang  July 29, 2021    SAFETY PLAN:  Step 1: Warning signs / cues (Thoughts, images, mood, situation, behavior) that a crisis may be developing:    Mood: worsening depression, agitation and loneliness    Behaviors: isolating/withdrawing   Step 2: Coping strategies - Things I can do to take my mind off of my problems without contacting another person (relaxation technique, physical activity):    Distress Tolerance Strategies:  spend time with my grandchildren, play bingo, go to the [employer]    Physical Activities: meditation and go for a walk in the park    Focus on helpful thoughts:  remind myself of what is important to me: my grandchildren  Step 3: People and social settings that provide distraction:   Name: April  Phone: [phone]   Name: Shell Phone: [phone]    [employer]   Step 4: Remind myself of people and things that are important to me and worth living for:  My grandchildren, my  children  Step 5: When I am in crisis, I can ask these people to help me use my safety plan:   Name: April  Phone: [phone]   Name: Shell Phone: [phone]  Step 6: Making the environment safe:     secure medications: give medications to my son and be around others  Step 7: Professionals or agencies I can contact during a crisis:    Suicide Prevention Lifeline: 2-497-147-TXHV (9838)    Crisis Text Line Service: Text   [place]  to 360817.    [facility] Crisis Services:  [phone]    Call 911 or go to my nearest emergency department.   I helped develop this safety plan and agree to use it when needed.  I have been given a copy of this plan.      Patient signature: _______________________________________________________________  Today s date:  July 29, 2021  Adapted from Safety Plan Template 2008 Damaris Lowry and Bossman Jasmine is reprinted with the express permission of the authors.  No portion of the Safety Plan Template may be reproduced without the express, written permission.  You can contact the authors at [email or web address] or [email or web address].

## 2021-08-02 ENCOUNTER — RADIOLOGY INJECTION OFFICE VISIT (OUTPATIENT)
Dept: PALLIATIVE MEDICINE | Facility: CLINIC | Age: 61
End: 2021-08-02
Payer: COMMERCIAL

## 2021-08-02 VITALS
HEART RATE: 92 BPM | OXYGEN SATURATION: 99 % | DIASTOLIC BLOOD PRESSURE: 93 MMHG | SYSTOLIC BLOOD PRESSURE: 179 MMHG | RESPIRATION RATE: 16 BRPM

## 2021-08-02 DIAGNOSIS — M53.3 SI (SACROILIAC) JOINT DYSFUNCTION: ICD-10-CM

## 2021-08-02 DIAGNOSIS — M47.896 OTHER SPONDYLOSIS, LUMBAR REGION: Primary | ICD-10-CM

## 2021-08-02 PROCEDURE — 64493 INJ PARAVERT F JNT L/S 1 LEV: CPT | Mod: RT | Performed by: PSYCHIATRY & NEUROLOGY

## 2021-08-02 PROCEDURE — 27096 INJECT SACROILIAC JOINT: CPT | Mod: 59 | Performed by: PSYCHIATRY & NEUROLOGY

## 2021-08-02 RX ORDER — TRIAMCINOLONE ACETONIDE 40 MG/ML
20 INJECTION, SUSPENSION INTRA-ARTICULAR; INTRAMUSCULAR ONCE
Status: COMPLETED | OUTPATIENT
Start: 2021-08-02 | End: 2021-08-02

## 2021-08-02 RX ORDER — TRIAMCINOLONE ACETONIDE 40 MG/ML
40 INJECTION, SUSPENSION INTRA-ARTICULAR; INTRAMUSCULAR ONCE
Status: COMPLETED | OUTPATIENT
Start: 2021-08-02 | End: 2021-08-02

## 2021-08-02 RX ADMIN — TRIAMCINOLONE ACETONIDE 40 MG: 40 INJECTION, SUSPENSION INTRA-ARTICULAR; INTRAMUSCULAR at 16:18

## 2021-08-02 RX ADMIN — TRIAMCINOLONE ACETONIDE 20 MG: 40 INJECTION, SUSPENSION INTRA-ARTICULAR; INTRAMUSCULAR at 16:18

## 2021-08-02 ASSESSMENT — PAIN SCALES - GENERAL: PAINLEVEL: SEVERE PAIN (7)

## 2021-08-02 NOTE — TELEPHONE ENCOUNTER
Please read encounter carefully. As stated below in my '.phrasecentral' it needs to be mailed out to the address;     Chelsea Chino, Patient Registration     [facility]

## 2021-08-02 NOTE — PATIENT INSTRUCTIONS
[facility]   Procedure Discharge Instructions    Today you saw:  Dr. Ruba Paz      You had an:   sacroiliac joint injection   facet joint injection      Medications used:  Lidocaine    Omnipaque  Ropivicaine   Kenalog              If you were holding your blood thinning medication, please restart taking it: N/A    Be cautious when walking. Numbness and/or weakness in the lower extremities may occur for up to 6-8 hours after the procedure due to effect of the local anesthetic    Do not drive for 6 hours. The effect of the local anesthetic could slow your reflexes.     You may resume your regular activities after 24 hours    Avoid strenuous activity for the first 24 hours    You may shower, however avoid swimming, tub baths or hot tubs for 24 hours following your procedure    You may have a mild to moderate increase in pain for several days following the injection.    It may take up to 14 days for the steroid medication to start working although you may feel the effect as early as a few days after the procedure.       You may use ice packs for 10-15 minutes, 3 to 4 times a day at the injection site for comfort    Do not use heat to painful areas for 6 to 8 hours. This will give the local anesthetic time to wear off and prevent you from accidentally burning your skin.     Unless you have been directed to avoid the use of anti-inflammatory medications (NSAIDS), you may use medications such as ibuprofen, Aleve or Tylenol for pain control if needed.     If you have diabetes, check your blood sugar more frequently than usual as your blood sugar may be higher than normal for 10-14 days following a steroid injection. Contact your doctor who manages your diabetes if your blood sugar is higher than usual    Possible side effects of steroids that you may experience include flushing, elevated blood pressure, increased appetite, mild headaches and restlessness.  All of these symptoms will get  better with time.    If you experience any of the following, call the Pain Clinic during work hours (Mon-Friday 8-4:30 pm) at [phone] or the Provider Line after hours at [phone]:  -Fever over 100 degree F  -Swelling, bleeding, redness, drainage, warmth at the injection site  -Progressive weakness or numbness in your legs  -Unusual new onset of pain that is not improving

## 2021-08-02 NOTE — NURSING NOTE
Pre-procedure Intake    Have you been fasting? NA    If yes, for how long?     Are you taking a prescribed blood thinner such as coumadin, Plavix, Xarelto?    No    If yes, when did you take your last dose?     Do you take aspirin?  No    If cervical procedure, have you held aspirin for 6 days?   NA    Do you have any allergies to contrast dye, iodine, steroid and/or numbing medications?  NO    Are you currently taking antibiotics or have an active infection?  NO    Have you had a fever/elevated temperature within the past week? NO    Are you currently taking oral steroids? NO    Do you have a ? Yes       Are you pregnant or breastfeeding?  No    Have you received the COVID-19 vaccine? Yes       If yes, was it your 1st, 2nd or only dose needed? 2nd dose     Date of most recent vaccine: per patient, 6 month ago.     Notify provider and RNs if systolic BP >170, diastolic BP >100, P >100 or O2 sats < 90%    Marielena Perales MA

## 2021-08-02 NOTE — PROGRESS NOTES
Pre procedure Diagnosis: facet arthropathy, SI joint dysfunction   Post procedure Diagnosis: Same  Procedure performed: right L5/S1 facet joint injections and right SI joint injection   Anesthesia: none  Complications: none  Operators: Shell Paz MD     Indications:   Nena Tang is a 61 year old female seen by me in clinic.They have a history of right low back and buttock pain.  Exam shows SI joint tenderness, tenderness along lumbar paraspinal, pain with range of motion and they have tried conservative treatment including medication.    Options/alternatives, benefits and risks were discussed with the patient including bleeding, infection, flared pain, tissue trauma, exposure to radiation, reaction to medications including seizure, spinal cord injury, paralysis, weakness, numbness and headache.   Questions were answered to her satisfaction and she agrees to proceed. Voluntary informed consent was obtained and signed.     Vitals were reviewed: Yes\  Allergies were reviewed:  Yes   Medications were reviewed:  Yes   Pre-procedure pain score: 7/10    Procedure:  After getting informed consent, patient was brought into the procedure suite and was placed in a prone position on the procedure table.   A Pause for the Cause was performed.  Patient was prepped and draped in sterile fashion.     After identifying the right SI joint, the C-arm was rotated to a obliquely to obtain the best view of the inferior angle of the joint.  A total of 3 ml of Lidocaine 1%  was used to anesthetize the skin at a skin entry site coaxial with the fluoroscopy beam at this location.  A 22gauge 3.5 inch needle was advanced under intermittent fluoroscopy until it was felt to enter the SI joint.    A total of 1ml of Omnipaque-300 was injected, confirming appropriate position, with spread into the intraarticular space, with no intravascular uptake noted.     1.5ml of 0.2% ropivacaine with 40mg of kenalog was injected.  The needle was  flushed with lidocaine and removed.      Under AP fluoroscopic guidance the L5-S1 facet joints on the right side were identified, and the C-arm was rotated obliquely to the affected side to open the joint space. A total of 2 ml of 1% lidocaine was injected at the needle entry point and needle tract. Then a 22 gauge 5 inch quincke type spinal needle was inserted and advanced under fluoroscopic guidance targeting the superior articular pillar of each joint. Once the needle made a contact with SAP, it was rotated and was then advanced into the joint.    AP fluoroscopic views were obtained to confirm the needle placement. Then, contrast was injected after negative aspiration for heme and CSF in each joint, confirming appropriate placement.  A total of 0.5ml of Omnipaque was used, and       The injection was then accomplished using a solution containing 0.5ml of 0.5% bupivacaine mixed with 20mg of kenalogThe needles were removed.    In total, 1.5ml of contrast was used, 8.5ml was wasted.      Hemostasis was achieved, the area was cleaned, and bandaids were placed when appropriate.  The patient tolerated the procedure well, and was taken to the recovery room.    Images were saved to PACS.    Post-procedure pain score: 5/10  Follow-up includes:   -f/u with referring provider    Shell Paz MD  [facility]

## 2021-08-03 ENCOUNTER — NURSE TRIAGE (OUTPATIENT)
Dept: FAMILY MEDICINE | Facility: CLINIC | Age: 61
End: 2021-08-03

## 2021-08-03 NOTE — TELEPHONE ENCOUNTER
Spoke with pt, she verbalized understanding of the instructions, take 10u of novolog when she gets home if her BG is still over 300, she also understood to move more and drink increased water until she gets the insulin in  She had not taken any novolog today    She will contact the clinic if she has any questions/concerns    Richelle Tucker RN   [facility]

## 2021-08-03 NOTE — TELEPHONE ENCOUNTER
Spoke to pt.     Concern for high blood glucose readings after steroid injection.     She had steroid injections yesterday and today she has elevated blood sugars.     She takes insulin novolog 7 units per meal and lantus BID 35 units.     Fasting blood sugar this morning 334. She is at day care today until 2pm. She is feeling fine. Had 2 eggs this morning. Does not have insulin with her. She forgot it and she forgot to take it this morning.     They told her she might have high readings and she should call PCP for direction. Vandana Aparicio RN on 8/3/2021 at 11:21 AM    Reason for Disposition    Blood glucose > 300 mg/dL (16.7 mmol/L)    Additional Information    Negative: Unconscious or difficult to awaken    Negative: Acting confused (e.g., disoriented, slurred speech)    Negative: Very weak (can't stand)    Negative: Sounds like a life-threatening emergency to the triager    Negative: Vomiting and signs of dehydration (e.g., very dry mouth, lightheaded, dark urine)    Negative: Blood glucose > 240 mg/dL (13.3 mmol/L) and rapid breathing    Negative: Blood glucose > 500 mg/dL (27.8 mmol/L)    Negative: Blood glucose > 240 mg/dL (13.3 mmol/L) AND urine ketones moderate-large (or more than 1+)    Negative: Blood glucose > 240 mg/dL (13.3 mmol/L) and blood ketones > 1.4 mmol/L    Negative: Blood glucose > 240 mg/dL (13.3 mmol/L) AND vomiting AND unable to check for ketones (in blood or urine)    Negative: Vomiting lasting > 4 hours    Negative: Patient sounds very sick or weak to the triager    Negative: Fever > 100.4 F (38.0 C)    Negative: Caller has URGENT medication or insulin pump question and triager unable to answer question    Negative: Blood glucose > 400 mg/dL (22.2 mmol/L)    Negative: Blood glucose > 300 mg/dL (16.7 mmol/L) AND two or more times in a row    Negative: Urine ketones moderate - large (or blood ketones > 1.4 mmol/L)    Negative: New-onset diabetes suspected (e.g., frequent urination, weak,  "weight loss)    Negative: Symptoms of high blood sugar (e.g., frequent urination, weak, weight loss) and not able to test blood glucose    Negative: Patient wants to be seen    Negative: Caller has NON-URGENT medication question about med that PCP prescribed and triager unable to answer question    Answer Assessment - Initial Assessment Questions  1. BLOOD GLUCOSE: \"What is your blood glucose level?\"       334  2. ONSET: \"When did you check the blood glucose?\"      Fasting this morning  3. USUAL RANGE: \"What is your glucose level usually?\" (e.g., usual fasting morning value, usual evening value)      normal  4. KETONES: \"Do you check for ketones (urine or blood test strips)?\" If yes, ask: \"What does the test show now?\"       no  5. TYPE 1 or 2:  \"Do you know what type of diabetes you have?\"  (e.g., Type 1, Type 2, Gestational; doesn't know)       2  6. INSULIN: \"Do you take insulin?\" \"What type of insulin(s) do you use? What is the mode of delivery? (syringe, pen; injection or pump)?\"       Yes, novolog 7 units each meal  7. DIABETES PILLS: \"Do you take any pills for your diabetes?\" If yes, ask: \"Have you missed taking any pills recently?\"      no  8. OTHER SYMPTOMS: \"Do you have any symptoms?\" (e.g., fever, frequent urination, difficulty breathing, dizziness, weakness, vomiting)      none  9. PREGNANCY: \"Is there any chance you are pregnant?\" \"When was your last menstrual period?\"      no    Protocols used: DIABETES - HIGH BLOOD SUGAR-A-OH      "

## 2021-08-03 NOTE — TELEPHONE ENCOUNTER
She needs her insulin! Is she able to go home and dose herself? If so, she can take a one time dose of 10 units when she gets home if she is still over 300.   For now, she needs to increase her water intake and move around as much as tolerated until she can get to her insulin.   ART Oliver CNP

## 2021-08-04 ENCOUNTER — TELEPHONE (OUTPATIENT)
Dept: FAMILY MEDICINE | Facility: CLINIC | Age: 61
End: 2021-08-04

## 2021-08-04 NOTE — TELEPHONE ENCOUNTER
Reason for Call:  Form, our goal is to have forms completed with 72 hours, however, some forms may require a visit or additional information.    Type of letter, form or note:  medical- Skilled Nurse Patient Missed Visit     Who is the form from?: [employer] [facility]  (if other please explain)    Where did the form come from: form was faxed in    What clinic location was the form placed at?: [facility]    Where the form was placed: [employer]/Folder    What number is listed as a contact on the form?:   PHONE:[phone]  FAX: [phone]       Additional comments: Please review, sign, date and fax back to the number listed above.     Call taken on 8/4/2021 at 9:09 AM by Kristie Montana

## 2021-08-06 NOTE — TELEPHONE ENCOUNTER
Spoke with patient. Form has 2 fax numbers and two separate instructions to mail and fax. Confirmed with patient that she would like forms faxed to facility. Confirmed fax number with staff.    Completed forms faxed back to [facility]  @ [phone]. Placed in abstraction to be scanned into patient's chart.    Richie Cisneros,   [facility]

## 2021-08-09 DIAGNOSIS — F25.0 SCHIZOAFFECTIVE DISORDER, BIPOLAR TYPE (H): ICD-10-CM

## 2021-08-10 ENCOUNTER — NURSE TRIAGE (OUTPATIENT)
Dept: FAMILY MEDICINE | Facility: CLINIC | Age: 61
End: 2021-08-10

## 2021-08-10 ENCOUNTER — HOSPITAL ENCOUNTER (OUTPATIENT)
Facility: CLINIC | Age: 61
Setting detail: OBSERVATION
Discharge: HOME | End: 2021-08-13
Attending: EMERGENCY MEDICINE | Admitting: INTERNAL MEDICINE
Payer: COMMERCIAL

## 2021-08-10 DIAGNOSIS — E11.9 TYPE 2 DIABETES MELLITUS WITHOUT COMPLICATION, WITH LONG-TERM CURRENT USE OF INSULIN (H): ICD-10-CM

## 2021-08-10 DIAGNOSIS — Z79.4 TYPE 2 DIABETES MELLITUS WITH MILD NONPROLIFERATIVE RETINOPATHY WITHOUT MACULAR EDEMA, WITH LONG-TERM CURRENT USE OF INSULIN, UNSPECIFIED LATERALITY (H): ICD-10-CM

## 2021-08-10 DIAGNOSIS — R73.9 HYPERGLYCEMIA: ICD-10-CM

## 2021-08-10 DIAGNOSIS — Z11.52 ENCOUNTER FOR SCREENING LABORATORY TESTING FOR SEVERE ACUTE RESPIRATORY SYNDROME CORONAVIRUS 2 (SARS-COV-2): ICD-10-CM

## 2021-08-10 DIAGNOSIS — E11.3299 TYPE 2 DIABETES MELLITUS WITH MILD NONPROLIFERATIVE RETINOPATHY WITHOUT MACULAR EDEMA, WITH LONG-TERM CURRENT USE OF INSULIN, UNSPECIFIED LATERALITY (H): ICD-10-CM

## 2021-08-10 DIAGNOSIS — Z79.4 ENCOUNTER FOR LONG-TERM (CURRENT) USE OF INSULIN (H): ICD-10-CM

## 2021-08-10 DIAGNOSIS — E11.65 UNCONTROLLED TYPE 2 DIABETES MELLITUS WITH HYPERGLYCEMIA (H): ICD-10-CM

## 2021-08-10 DIAGNOSIS — Z79.4 TYPE 2 DIABETES MELLITUS WITHOUT COMPLICATION, WITH LONG-TERM CURRENT USE OF INSULIN (H): ICD-10-CM

## 2021-08-10 LAB
ALBUMIN SERPL-MCNC: 3.4 G/DL (ref 3.4–5)
ALBUMIN UR-MCNC: NEGATIVE MG/DL
ALP SERPL-CCNC: 128 U/L (ref 40–150)
ALT SERPL W P-5'-P-CCNC: 25 U/L (ref 0–50)
ANION GAP SERPL CALCULATED.3IONS-SCNC: 9 MMOL/L (ref 3–14)
APPEARANCE UR: ABNORMAL
AST SERPL W P-5'-P-CCNC: 9 U/L (ref 0–45)
BACTERIA #/AREA URNS HPF: ABNORMAL /HPF
BASOPHILS # BLD AUTO: 0 10E3/UL (ref 0–0.2)
BASOPHILS NFR BLD AUTO: 0 %
BILIRUB SERPL-MCNC: 0.2 MG/DL (ref 0.2–1.3)
BILIRUB UR QL STRIP: NEGATIVE
BUN SERPL-MCNC: 29 MG/DL (ref 7–30)
CALCIUM SERPL-MCNC: 8.9 MG/DL (ref 8.5–10.1)
CHLORIDE BLD-SCNC: 100 MMOL/L (ref 94–109)
CO2 SERPL-SCNC: 20 MMOL/L (ref 20–32)
COLOR UR AUTO: ABNORMAL
CREAT SERPL-MCNC: 1.31 MG/DL (ref 0.52–1.04)
EOSINOPHIL # BLD AUTO: 0 10E3/UL (ref 0–0.7)
EOSINOPHIL NFR BLD AUTO: 1 %
ERYTHROCYTE [DISTWIDTH] IN BLOOD BY AUTOMATED COUNT: 12.4 % (ref 10–15)
GFR SERPL CREATININE-BSD FRML MDRD: 44 ML/MIN/1.73M2
GLUCOSE BLD-MCNC: 641 MG/DL (ref 70–99)
GLUCOSE BLDC GLUCOMTR-MCNC: 162 MG/DL (ref 70–99)
GLUCOSE BLDC GLUCOMTR-MCNC: 232 MG/DL (ref 70–99)
GLUCOSE BLDC GLUCOMTR-MCNC: 489 MG/DL (ref 70–99)
GLUCOSE BLDC GLUCOMTR-MCNC: >600 MG/DL (ref 70–99)
GLUCOSE UR STRIP-MCNC: >=1000 MG/DL
HBA1C MFR BLD: 10 % (ref 0–5.6)
HCT VFR BLD AUTO: 34.7 % (ref 35–47)
HGB BLD-MCNC: 10.9 G/DL (ref 11.7–15.7)
HGB UR QL STRIP: ABNORMAL
IMM GRANULOCYTES # BLD: 0.1 10E3/UL
IMM GRANULOCYTES NFR BLD: 1 %
KETONES BLD-SCNC: 0 MMOL/L (ref 0–0.6)
KETONES UR STRIP-MCNC: NEGATIVE MG/DL
LEUKOCYTE ESTERASE UR QL STRIP: ABNORMAL
LYMPHOCYTES # BLD AUTO: 1.4 10E3/UL (ref 0.8–5.3)
LYMPHOCYTES NFR BLD AUTO: 25 %
MCH RBC QN AUTO: 31.1 PG (ref 26.5–33)
MCHC RBC AUTO-ENTMCNC: 31.4 G/DL (ref 31.5–36.5)
MCV RBC AUTO: 99 FL (ref 78–100)
MONOCYTES # BLD AUTO: 0.4 10E3/UL (ref 0–1.3)
MONOCYTES NFR BLD AUTO: 6 %
MUCOUS THREADS #/AREA URNS LPF: PRESENT /LPF
NEUTROPHILS # BLD AUTO: 3.9 10E3/UL (ref 1.6–8.3)
NEUTROPHILS NFR BLD AUTO: 67 %
NITRATE UR QL: NEGATIVE
NRBC # BLD AUTO: 0 10E3/UL
NRBC BLD AUTO-RTO: 0 /100
PH UR STRIP: 6 [PH] (ref 5–7)
PLATELET # BLD AUTO: 193 10E3/UL (ref 150–450)
POTASSIUM BLD-SCNC: 4 MMOL/L (ref 3.4–5.3)
PROT SERPL-MCNC: 7.4 G/DL (ref 6.8–8.8)
RBC # BLD AUTO: 3.5 10E6/UL (ref 3.8–5.2)
RBC URINE: 1 /HPF
SARS-COV-2 RNA RESP QL NAA+PROBE: NEGATIVE
SARS-COV-2 RNA RESP QL NAA+PROBE: NEGATIVE
SODIUM SERPL-SCNC: 129 MMOL/L (ref 133–144)
SP GR UR STRIP: 1.02 (ref 1–1.03)
SQUAMOUS EPITHELIAL: 2 /HPF
UROBILINOGEN UR STRIP-MCNC: NORMAL MG/DL
WBC # BLD AUTO: 5.7 10E3/UL (ref 4–11)
WBC URINE: 5 /HPF

## 2021-08-10 PROCEDURE — 87086 URINE CULTURE/COLONY COUNT: CPT | Performed by: EMERGENCY MEDICINE

## 2021-08-10 PROCEDURE — 36415 COLL VENOUS BLD VENIPUNCTURE: CPT | Performed by: EMERGENCY MEDICINE

## 2021-08-10 PROCEDURE — 87635 SARS-COV-2 COVID-19 AMP PRB: CPT | Performed by: EMERGENCY MEDICINE

## 2021-08-10 PROCEDURE — 93005 ELECTROCARDIOGRAM TRACING: CPT | Performed by: EMERGENCY MEDICINE

## 2021-08-10 PROCEDURE — 250N000012 HC RX MED GY IP 250 OP 636 PS 637: Performed by: INTERNAL MEDICINE

## 2021-08-10 PROCEDURE — G0378 HOSPITAL OBSERVATION PER HR: HCPCS

## 2021-08-10 PROCEDURE — 99220 PR INITIAL OBSERVATION CARE,LEVEL III: CPT | Performed by: INTERNAL MEDICINE

## 2021-08-10 PROCEDURE — 99285 EMERGENCY DEPT VISIT HI MDM: CPT | Mod: 25 | Performed by: EMERGENCY MEDICINE

## 2021-08-10 PROCEDURE — 258N000003 HC RX IP 258 OP 636: Performed by: EMERGENCY MEDICINE

## 2021-08-10 PROCEDURE — 250N000013 HC RX MED GY IP 250 OP 250 PS 637: Performed by: INTERNAL MEDICINE

## 2021-08-10 PROCEDURE — 96360 HYDRATION IV INFUSION INIT: CPT | Performed by: EMERGENCY MEDICINE

## 2021-08-10 PROCEDURE — 81001 URINALYSIS AUTO W/SCOPE: CPT | Performed by: EMERGENCY MEDICINE

## 2021-08-10 PROCEDURE — 83036 HEMOGLOBIN GLYCOSYLATED A1C: CPT | Performed by: EMERGENCY MEDICINE

## 2021-08-10 PROCEDURE — 93010 ELECTROCARDIOGRAM REPORT: CPT | Performed by: EMERGENCY MEDICINE

## 2021-08-10 PROCEDURE — 82010 KETONE BODYS QUAN: CPT | Performed by: EMERGENCY MEDICINE

## 2021-08-10 PROCEDURE — 96361 HYDRATE IV INFUSION ADD-ON: CPT | Performed by: EMERGENCY MEDICINE

## 2021-08-10 PROCEDURE — 85004 AUTOMATED DIFF WBC COUNT: CPT | Performed by: EMERGENCY MEDICINE

## 2021-08-10 PROCEDURE — 96372 THER/PROPH/DIAG INJ SC/IM: CPT | Performed by: INTERNAL MEDICINE

## 2021-08-10 PROCEDURE — C9803 HOPD COVID-19 SPEC COLLECT: HCPCS | Performed by: EMERGENCY MEDICINE

## 2021-08-10 PROCEDURE — 82040 ASSAY OF SERUM ALBUMIN: CPT | Performed by: EMERGENCY MEDICINE

## 2021-08-10 PROCEDURE — 250N000012 HC RX MED GY IP 250 OP 636 PS 637: Performed by: EMERGENCY MEDICINE

## 2021-08-10 PROCEDURE — 96372 THER/PROPH/DIAG INJ SC/IM: CPT | Performed by: EMERGENCY MEDICINE

## 2021-08-10 RX ORDER — AMANTADINE HYDROCHLORIDE 100 MG/1
100 CAPSULE, GELATIN COATED ORAL DAILY
Status: DISCONTINUED | OUTPATIENT
Start: 2021-08-11 | End: 2021-08-13 | Stop reason: HOSPADM

## 2021-08-10 RX ORDER — CLONAZEPAM 0.5 MG/1
0.5 TABLET ORAL AT BEDTIME
Qty: 30 TABLET | Refills: 0 | Status: SHIPPED | OUTPATIENT
Start: 2021-08-10 | End: 2021-09-23

## 2021-08-10 RX ORDER — ASPIRIN 81 MG/1
81 TABLET ORAL DAILY
Status: DISCONTINUED | OUTPATIENT
Start: 2021-08-11 | End: 2021-08-13 | Stop reason: HOSPADM

## 2021-08-10 RX ORDER — GABAPENTIN 300 MG/1
300 CAPSULE ORAL AT BEDTIME
Status: DISCONTINUED | OUTPATIENT
Start: 2021-08-10 | End: 2021-08-13 | Stop reason: HOSPADM

## 2021-08-10 RX ORDER — TOPIRAMATE 100 MG/1
200 TABLET, FILM COATED ORAL 2 TIMES DAILY
Status: DISCONTINUED | OUTPATIENT
Start: 2021-08-10 | End: 2021-08-13 | Stop reason: HOSPADM

## 2021-08-10 RX ORDER — AMANTADINE HYDROCHLORIDE 100 MG/1
200 CAPSULE, GELATIN COATED ORAL EVERY EVENING
Status: DISCONTINUED | OUTPATIENT
Start: 2021-08-10 | End: 2021-08-13 | Stop reason: HOSPADM

## 2021-08-10 RX ORDER — TRAZODONE HYDROCHLORIDE 100 MG/1
100 TABLET ORAL
Status: DISCONTINUED | OUTPATIENT
Start: 2021-08-10 | End: 2021-08-13 | Stop reason: HOSPADM

## 2021-08-10 RX ORDER — SODIUM CHLORIDE 9 MG/ML
INJECTION, SOLUTION INTRAVENOUS CONTINUOUS
Status: DISCONTINUED | OUTPATIENT
Start: 2021-08-10 | End: 2021-08-12

## 2021-08-10 RX ORDER — DEXTROSE MONOHYDRATE 25 G/50ML
25-50 INJECTION, SOLUTION INTRAVENOUS
Status: DISCONTINUED | OUTPATIENT
Start: 2021-08-10 | End: 2021-08-13 | Stop reason: HOSPADM

## 2021-08-10 RX ORDER — PALIPERIDONE 9 MG/1
9 TABLET, EXTENDED RELEASE ORAL AT BEDTIME
Status: DISCONTINUED | OUTPATIENT
Start: 2021-08-10 | End: 2021-08-13 | Stop reason: HOSPADM

## 2021-08-10 RX ORDER — ONDANSETRON 2 MG/ML
4 INJECTION INTRAMUSCULAR; INTRAVENOUS EVERY 6 HOURS PRN
Status: DISCONTINUED | OUTPATIENT
Start: 2021-08-10 | End: 2021-08-13 | Stop reason: HOSPADM

## 2021-08-10 RX ORDER — NICOTINE POLACRILEX 4 MG
15-30 LOZENGE BUCCAL
Status: DISCONTINUED | OUTPATIENT
Start: 2021-08-10 | End: 2021-08-13 | Stop reason: HOSPADM

## 2021-08-10 RX ORDER — CLONAZEPAM 0.5 MG/1
0.5 TABLET ORAL AT BEDTIME
Status: DISCONTINUED | OUTPATIENT
Start: 2021-08-10 | End: 2021-08-13 | Stop reason: HOSPADM

## 2021-08-10 RX ORDER — HYDRALAZINE HYDROCHLORIDE 25 MG/1
25 TABLET, FILM COATED ORAL 4 TIMES DAILY PRN
Status: DISCONTINUED | OUTPATIENT
Start: 2021-08-10 | End: 2021-08-13 | Stop reason: HOSPADM

## 2021-08-10 RX ORDER — ONDANSETRON 4 MG/1
4 TABLET, ORALLY DISINTEGRATING ORAL EVERY 6 HOURS PRN
Status: DISCONTINUED | OUTPATIENT
Start: 2021-08-10 | End: 2021-08-13 | Stop reason: HOSPADM

## 2021-08-10 RX ORDER — PANTOPRAZOLE SODIUM 40 MG/1
40 TABLET, DELAYED RELEASE ORAL DAILY
Status: DISCONTINUED | OUTPATIENT
Start: 2021-08-11 | End: 2021-08-13 | Stop reason: HOSPADM

## 2021-08-10 RX ORDER — SERTRALINE HYDROCHLORIDE 100 MG/1
200 TABLET, FILM COATED ORAL DAILY
Status: DISCONTINUED | OUTPATIENT
Start: 2021-08-11 | End: 2021-08-13 | Stop reason: HOSPADM

## 2021-08-10 RX ORDER — ESCITALOPRAM OXALATE 10 MG/1
10 TABLET ORAL DAILY
Status: DISCONTINUED | OUTPATIENT
Start: 2021-08-11 | End: 2021-08-13 | Stop reason: HOSPADM

## 2021-08-10 RX ORDER — ATORVASTATIN CALCIUM 40 MG/1
80 TABLET, FILM COATED ORAL DAILY
Status: DISCONTINUED | OUTPATIENT
Start: 2021-08-11 | End: 2021-08-13 | Stop reason: HOSPADM

## 2021-08-10 RX ORDER — FAMOTIDINE 20 MG/1
20 TABLET, FILM COATED ORAL DAILY
Status: DISCONTINUED | OUTPATIENT
Start: 2021-08-11 | End: 2021-08-13 | Stop reason: HOSPADM

## 2021-08-10 RX ORDER — NICOTINE POLACRILEX 4 MG
15-30 LOZENGE BUCCAL
Status: DISCONTINUED | OUTPATIENT
Start: 2021-08-10 | End: 2021-08-10

## 2021-08-10 RX ORDER — SENNOSIDES 8.6 MG
650 CAPSULE ORAL EVERY 8 HOURS PRN
Status: DISCONTINUED | OUTPATIENT
Start: 2021-08-10 | End: 2021-08-12 | Stop reason: CLARIF

## 2021-08-10 RX ORDER — METOPROLOL SUCCINATE 50 MG/1
50 TABLET, EXTENDED RELEASE ORAL DAILY
Status: DISCONTINUED | OUTPATIENT
Start: 2021-08-11 | End: 2021-08-13 | Stop reason: HOSPADM

## 2021-08-10 RX ORDER — DEXTROSE MONOHYDRATE 25 G/50ML
25-50 INJECTION, SOLUTION INTRAVENOUS
Status: DISCONTINUED | OUTPATIENT
Start: 2021-08-10 | End: 2021-08-10

## 2021-08-10 RX ADMIN — CLONAZEPAM 0.5 MG: 0.5 TABLET ORAL at 22:51

## 2021-08-10 RX ADMIN — SODIUM CHLORIDE: 9 INJECTION, SOLUTION INTRAVENOUS at 19:59

## 2021-08-10 RX ADMIN — GABAPENTIN 300 MG: 300 CAPSULE ORAL at 22:51

## 2021-08-10 RX ADMIN — HYDRALAZINE HYDROCHLORIDE 25 MG: 25 TABLET ORAL at 22:51

## 2021-08-10 RX ADMIN — SODIUM CHLORIDE 1000 ML: 9 INJECTION, SOLUTION INTRAVENOUS at 16:39

## 2021-08-10 RX ADMIN — INSULIN ASPART 8 UNITS: 100 INJECTION, SOLUTION INTRAVENOUS; SUBCUTANEOUS at 16:34

## 2021-08-10 RX ADMIN — AMANTADINE HYDROCHLORIDE 200 MG: 100 CAPSULE ORAL at 22:50

## 2021-08-10 RX ADMIN — PALIPERIDONE 9 MG: 9 TABLET, EXTENDED RELEASE ORAL at 22:51

## 2021-08-10 RX ADMIN — INSULIN ASPART 1 UNITS: 100 INJECTION, SOLUTION INTRAVENOUS; SUBCUTANEOUS at 22:51

## 2021-08-10 RX ADMIN — TOPIRAMATE 200 MG: 100 TABLET, FILM COATED ORAL at 22:51

## 2021-08-10 RX ADMIN — SODIUM CHLORIDE 1000 ML: 9 INJECTION, SOLUTION INTRAVENOUS at 13:56

## 2021-08-10 RX ADMIN — INSULIN GLARGINE 35 UNITS: 100 INJECTION, SOLUTION SUBCUTANEOUS at 22:51

## 2021-08-10 ASSESSMENT — ENCOUNTER SYMPTOMS
FATIGUE: 1
DIZZINESS: 1
FEVER: 0
ABDOMINAL PAIN: 0

## 2021-08-10 NOTE — TELEPHONE ENCOUNTER
Rowena,    Requested Prescriptions   Pending Prescriptions Disp Refills     clonazePAM (KLONOPIN) 0.5 MG tablet 30 tablet 0     Sig: Take 1 tablet (0.5 mg) by mouth At Bedtime       There is no refill protocol information for this order        Routing refill request to provider for review/approval because:  Drug not on the [facility] refill protocol     Crissy Pulido RN  [facility]

## 2021-08-10 NOTE — ED PROVIDER NOTES
[facility] EMERGENCY DEPARTMENT ([facility])       8/10/21  History     Chief Complaint   Patient presents with     Dizziness     Patient presents today with complaints of dizziness. Patient is type II diabetic, reports taking home medications. Patient reports blood sugar of 340 last night. Today blood sugar is too high to be read with glucometer.     Hyperglycemia     The history is provided by the patient and medical records.   Hyperglycemia  Associated symptoms: dizziness and fatigue    Associated symptoms: no abdominal pain and no fever      Nena Tang is a 61 year old female with a past medical history significant for type 2 diabetes, coronary artery disease, hypertension, and schizoaffective disorder who presents to the Emergency Department for evaluation of dizziness. Patient felt dizzy, exhausted, and generally unwell at adult  today and was subsequently driven to the ED by adult care staff. She has been feeling this way since last night. Her sister and son are available to be contacted by phone. She reports a blood sugar of 340 last night. Attempts to check her blood sugar in triage resulted in too high to read. She has not taken Lantus or novolog since Saturday. She has been taking her other home medications as prescribed.  Patient has not yet eaten this morning. She endorses thirst. She has received her COVID vaccine. She denies pain or fever.    Per chart review, patient was seen here in the ED on 7/15/21 for evaluation of an elevated blood sugar of 330 and increased fatigue. She was experiencing associated blurry vision, abdominal pain, shortness of breath, epigastric pain, diarrhea, polyuria, polydipsia, and headache. After 1L of normal saline, her blood sugar was measured at 266. No insulin was given. Chest X-ray was normal. UA with trace leukocyte esterase, but no urinary symptoms. ECG with no acute ischemic changes. Patient was recommended to work with her PCP to help manage  her diabetes.    Past Medical History:   Diagnosis Date     Acute respiratory failure with hypoxia (H) 9/4/2017     CAD (coronary artery disease)     5/2014 cath, nonbostructive stenosis to LAD, RCA.     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/2011     Noncompliance with medication regimen 6/8/2011     Obesity      CINDY (obstructive sleep apnea) 3/8/2012    uses CPAP     Osteopenia 10/7/2009     Pneumonia of right lower lobe due to infectious organism 9/4/2017     Schizoaffective disorder, depressive type (H) 2/25/2013     Sepsis (H) 8/29/2017     Suicidal intent 10/2/2013     Takotsubo cardiomyopathy      Type 2 diabetes mellitus (H) 8/30/2011     Uterine cancer (H) 1983     Verbal auditory hallucination 10/4/2012       Past Surgical History:   Procedure Laterality Date     C OOPHORECTORMY FOR MALIG, W/BX  1983    UTERINE     CATARACT IOL, RT/LT Bilateral 2017     CHOLECYSTECTOMY       COLONOSCOPY N/A 3/16/2017    Procedure: COLONOSCOPY;  Surgeon: Traci Gonzalez MD;  Location:  GI     Coronary CTA  5/21/2014     HYSTERECTOMY  1983    uterine cancer yearly pap's per provider.     HYSTERECTOMY       LAPAROSCOPIC CHOLECYSTECTOMY  2008     PHACOEMULSIFICATION CLEAR CORNEA WITH STANDARD INTRAOCULAR LENS IMPLANT Left 5/5/2017    Procedure: PHACOEMULSIFICATION CLEAR CORNEA WITH STANDARD INTRAOCULAR LENS IMPLANT;  LEFT EYE PHACOEMULSIFICATION CLEAR CORNEA WITH STANDARD INTRAOCULAR LENS IMPLANT ;  Surgeon: Tyra Diaz MD;  Location: [facility]     PHACOEMULSIFICATION CLEAR CORNEA WITH STANDARD INTRAOCULAR LENS IMPLANT Right 6/30/2017    Procedure: PHACOEMULSIFICATION CLEAR CORNEA WITH  STANDARD INTRAOCULAR LENS IMPLANT;  RIGHT EYE PHACOEMULSIFICATION CLEAR CORNEA WITH STANDARD INTRAOCULAR LENS IMPLANT;  Surgeon: Tyra Diaz MD;  Location:  [facility]     RELEASE TRIGGER FINGER  10/11/2012    Left thumb. Procedure: RELEASE TRIGGER FINGER;  LEFT THUMB TRIGGER RELEASE;  Surgeon: Tay Langley MD;  Location:  [facility]     RELEASE TRIGGER FINGER Right 2016    Procedure: RELEASE TRIGGER FINGER;  Surgeon: Albino Castañeda MD;  Location: [facility] OR       Family History   Problem Relation Age of Onset     Cancer Mother         BLADDER     Respiratory Mother         COPD     Gastrointestinal Disease Mother         CIRRHOSIS OF LI BOLIVAR     Alcohol/Drug Mother      Diabetes Mother      Hypertension Mother      Lipids Mother      C.A.D. Mother      Glaucoma Mother      Alcohol/Drug Sister      Mental Illness Sister      Alcohol/Drug Sister      Psychotic Disorder Sister      Cancer Maternal Grandmother         UNKNOWN TYPE     Cancer Brother         COLON     Cancer - colorectal Brother         IN HIS LATE 30S     Alcohol/Drug Brother          OF HEROIN OVERDOSE AT AGE 22 YRS     Macular Degeneration No family hx of        Social History     Tobacco Use     Smoking status: Never Smoker     Smokeless tobacco: Never Used   Substance Use Topics     Alcohol use: No     Comment: last month       Current Facility-Administered Medications   Medication     0.9% sodium chloride BOLUS    Followed by     sodium chloride 0.9% infusion     Current Outpatient Medications   Medication     acetaminophen (TYLENOL) 650 MG CR tablet     alcohol swab prep pads     amantadine (SYMMETREL) 100 MG capsule     aspirin (ASA) 81 MG EC tablet     atorvastatin (LIPITOR) 80 MG tablet     benzonatate (TESSALON) 100 MG capsule     blood glucose (NO BRAND SPECIFIED) test strip     clonazePAM (KLONOPIN) 0.5 MG tablet     COMPOUNDED NON-CONTROLLED SUBSTANCE (CMPD RX) - PHARMACY TO MIX COMPOUNDED MEDICATION     Continuous Blood Gluc  Sensor (FREESTYLE LEBRON 14 DAY SENSOR) MISC     escitalopram (LEXAPRO) 10 MG tablet     famotidine (PEPCID) 20 MG tablet     fluticasone-salmeterol (ADVAIR) 250-50 MCG/DOSE inhaler     gabapentin (NEURONTIN) 300 MG capsule     hydrOXYzine (VISTARIL) 25 MG capsule     insulin aspart (NOVOLOG FLEXPEN) 100 UNIT/ML pen     insulin glargine (LANTUS PEN) 100 UNIT/ML pen     insulin pen needle (NOVOFINE 30) 30G X 8 MM miscellaneous     ipratropium - albuterol 0.5 mg/2.5 mg/3 mL (DUONEB) 0.5-2.5 (3) MG/3ML neb solution     losartan (COZAAR) 100 MG tablet     metoprolol succinate ER (TOPROL-XL) 25 MG 24 hr tablet     nystatin (MYCOSTATIN) 691861 UNIT/GM external powder     ondansetron (ZOFRAN) 4 MG tablet     order for DME     order for DME     paliperidone ER (INVEGA) 9 MG 24 hr tablet     pantoprazole (PROTONIX) 40 MG EC tablet     senna-docusate (SENOKOT-S/PERICOLACE) 8.6-50 MG tablet     sertraline (ZOLOFT) 100 MG tablet     thin (NO BRAND SPECIFIED) lancets     topiramate (TOPAMAX) 200 MG tablet     traZODone (DESYREL) 100 MG tablet     TRULICITY 1.5 MG/0.5ML pen     zinc oxide (DESITIN) 40 % external ointment        Allergies   Allergen Reactions     Imidazole Antifungals Hives     Tolerates diflucan     Ketoprofen Itching     Pruritis to topical     Lisinopril Hives     Metformin Other (See Comments)     Patient hospitalized for lactic acidosis - admitting provider suspectd caused by metformin     Metronidazole Hives     Posaconazole Hives     Tolerates diflucan       I have reviewed the Medications, Allergies, Past Medical and Surgical History, and Social History in the Epic system.    Review of Systems   Constitutional: Positive for fatigue. Negative for fever.   Gastrointestinal: Negative for abdominal pain.   Neurological: Positive for dizziness.   All other systems reviewed and are negative.    A complete review of systems was performed with pertinent positives and negatives noted in the HPI, and all other  systems negative.    Physical Exam   BP: 132/55  Pulse: 96  Temp: 97.4  F (36.3  C)  Resp: 16  SpO2: 96 %      Physical Exam  Constitutional:       General: She is not in acute distress.     Appearance: She is well-developed. She is not ill-appearing, toxic-appearing or diaphoretic.   HENT:      Head: Normocephalic and atraumatic.   Cardiovascular:      Rate and Rhythm: Normal rate and regular rhythm.      Pulses: Normal pulses.      Heart sounds: Normal heart sounds.   Pulmonary:      Effort: Pulmonary effort is normal. No respiratory distress.      Breath sounds: Normal breath sounds.   Abdominal:      General: There is no distension.      Palpations: Abdomen is soft.      Tenderness: There is no abdominal tenderness. There is no rebound.   Musculoskeletal:         General: No swelling or tenderness.      Cervical back: Normal range of motion.   Skin:     General: Skin is warm and dry.      Coloration: Skin is not jaundiced.      Findings: No rash.   Neurological:      General: No focal deficit present.      Mental Status: She is alert and oriented to person, place, and time.      Cranial Nerves: No cranial nerve deficit.      Sensory: No sensory deficit.      Motor: No weakness.   Psychiatric:         Mood and Affect: Mood normal.         Behavior: Behavior normal.         Thought Content: Thought content normal.         Judgment: Judgment normal.         ED Course   2:39 PM  The patient was seen and examined by Zenaida Graham MD in Room ED08.        Procedures            EKG Interpretation:      Interpreted by Zenaida Graham MD  Time reviewed: 5739  Symptoms at time of EKG: None   Rhythm: normal sinus   Rate: Normal  Axis: Left Axis Deviation  Ectopy: none  Conduction: normal  ST Segments/ T Waves: No ST-T wave changes and No acute ischemic changes  Q Waves: II, III and aVf  Comparison to prior: Unchanged    Clinical Impression: normal EKG                The medical record was reviewed and  interpreted.  Current labs reviewed and interpreted.  Previous labs reviewed and interpreted.         Results for orders placed or performed during the hospital encounter of 08/10/21 (from the past 24 hour(s))   Glucose by meter   Result Value Ref Range    GLUCOSE BY METER POCT >600 (HH) 70 - 99 mg/dL   CBC with platelets differential    Narrative    The following orders were created for panel order CBC with platelets differential.  Procedure                               Abnormality         Status                     ---------                               -----------         ------                     CBC with platelets and d...[[id number]]  Abnormal            Final result                 Please view results for these tests on the individual orders.   Ketone Beta-Hydroxybutyrate Quantitative   Result Value Ref Range    Ketone (Beta-Hydroxybutyrate) Quantitative 0.0 0.0 - 0.6 mmol/L   Comprehensive metabolic panel   Result Value Ref Range    Sodium 129 (L) 133 - 144 mmol/L    Potassium 4.0 3.4 - 5.3 mmol/L    Chloride 100 94 - 109 mmol/L    Carbon Dioxide (CO2) 20 20 - 32 mmol/L    Anion Gap 9 3 - 14 mmol/L    Urea Nitrogen 29 7 - 30 mg/dL    Creatinine 1.31 (H) 0.52 - 1.04 mg/dL    Calcium 8.9 8.5 - 10.1 mg/dL    Glucose 641 (HH) 70 - 99 mg/dL    Alkaline Phosphatase 128 40 - 150 U/L    AST 9 0 - 45 U/L    ALT 25 0 - 50 U/L    Protein Total 7.4 6.8 - 8.8 g/dL    Albumin 3.4 3.4 - 5.0 g/dL    Bilirubin Total 0.2 0.2 - 1.3 mg/dL    GFR Estimate 44 (L) >60 mL/min/1.73m2   CBC with platelets and differential   Result Value Ref Range    WBC Count 5.7 4.0 - 11.0 10e3/uL    RBC Count 3.50 (L) 3.80 - 5.20 10e6/uL    Hemoglobin 10.9 (L) 11.7 - 15.7 g/dL    Hematocrit 34.7 (L) 35.0 - 47.0 %    MCV 99 78 - 100 fL    MCH 31.1 26.5 - 33.0 pg    MCHC 31.4 (L) 31.5 - 36.5 g/dL    RDW 12.4 10.0 - 15.0 %    Platelet Count 193 150 - 450 10e3/uL    % Neutrophils 67 %    % Lymphocytes 25 %    % Monocytes 6 %    % Eosinophils 1 %    %  Basophils 0 %    % Immature Granulocytes 1 %    NRBCs per 100 WBC 0 <1 /100    Absolute Neutrophils 3.9 1.6 - 8.3 10e3/uL    Absolute Lymphocytes 1.4 0.8 - 5.3 10e3/uL    Absolute Monocytes 0.4 0.0 - 1.3 10e3/uL    Absolute Eosinophils 0.0 0.0 - 0.7 10e3/uL    Absolute Basophils 0.0 0.0 - 0.2 10e3/uL    Absolute Immature Granulocytes 0.1 (H) <=0.0 10e3/uL    Absolute NRBCs 0.0 10e3/uL   EKG 12-lead, tracing only   Result Value Ref Range    Systolic Blood Pressure  mmHg    Diastolic Blood Pressure  mmHg    Ventricular Rate 99 BPM    Atrial Rate 99 BPM    AZ Interval 170 ms    QRS Duration 92 ms     ms    QTc 449 ms    P Axis 43 degrees    R AXIS -55 degrees    T Axis 14 degrees    Interpretation ECG       Sinus rhythm  Left axis deviation  Possible Anterior infarct , age undetermined  Abnormal ECG       *Note: Due to a large number of results and/or encounters for the requested time period, some results have not been displayed. A complete set of results can be found in Results Review.     Medications   0.9% sodium chloride BOLUS (1,000 mLs Intravenous New Bag 8/10/21 1356)     Followed by   0.9% sodium chloride BOLUS (has no administration in time range)     Followed by   sodium chloride 0.9% infusion (has no administration in time range)             Assessments & Plan (with Medical Decision Making)   Patient is a 61-year-old female with a known history of diabetes who presents to the ER due to increased fatigue and hyperglycemia.  Patient was feeling very tired and dizzy today and her sugar was checked and found to be high.  Patient goes to an adult .  She says that she has not taken her Lantus in at least the past 2 days.  Patient here initially had sugars above 600.  Patient received 2 L of IV fluids and her sugars have improved to the mid 400s.  Patient was given a dose of subcu insulin.  Plan will be to admit the patient to internal medicine for treatment with insulin and hydration.  Patient will  need to be seen by diabetic education in the morning.  The case was initially discussed with the ED observation unit [facility] but there is currently no beds available on the ED observation unit.  Patient will therefore be admitted to internal medicine on the [facility] for admission.  Report was given to the internal medicine triage Dr. Moreno for admission.    I have reviewed the nursing notes.    I have reviewed the findings, diagnosis, plan and need for follow up with the patient.    New Prescriptions    No medications on file       Final diagnoses:   Hyperglycemia   Type 2 diabetes mellitus without complication, with long-term current use of insulin (H)     I, Antonella Saini, am serving as a trained medical scribe to document services personally performed by Zenaida Graham MD based on the provider's statements to me on August 10, 2021.  This document has been checked and approved by the attending provider.    IZenaida MD, was physically present and have reviewed and verified the accuracy of this note documented by Antonella Saini, medical scribe.      Zenaida Graham MD  8/10/2021   [facility] EMERGENCY DEPARTMENT     Zenaida Graham MD  08/10/21 8600

## 2021-08-10 NOTE — TELEPHONE ENCOUNTER
"Lexie - Nurse - [facility]  or [facility] [phone] calling with regard to new onset patient symptoms today 8/10/2021 - nurse reports patient advised she was started on a new medication recently doesn't know what - experiencing side effects symptoms - \"spaced out\", dizzy, slow speech    BP 94/55    BP 82/58    She has appointment at [facility] today and nurse requesting staff to manage symptoms/medication    Reason for Disposition    Systolic BP < 90 and feeling dizzy, lightheaded, or weak    Protocols used: LOW BLOOD PRESSURE-A-OH    Advised 911/EMS for low BP with symptoms please - nurse verbalized understanding and agrees with plan - writer notes no recent medication changes/huddle with Waldo  "

## 2021-08-10 NOTE — ED TRIAGE NOTES
Patient lives with sister, but attends adult day care in [place]. Patient was at adult day care today when she felt dizziness and not feeling well. Patient was then driven to ED and dropped off by staff. Sister and son available to be contacted by phone. Attempted to test blood sugar in triage. Resulted TOO HIGH to read.

## 2021-08-10 NOTE — PHARMACY-ADMISSION MEDICATION HISTORY
Admission Medication History Completed by Pharmacy    See [facility] Admission Navigator for allergy information, preferred outpatient pharmacy, prior to admission medications and immunization status.     Medication History Sources:     Patient    Dispense History    Changes made to PTA medication list (reason):    Added: None    Deleted: None    Changed:  o Novolog 100unit/mL pen (6 units --> 10 units 3 times daily with meals, per patient)    Additional Information:    Patient stated she did not recognize the following medications:  o Famotidine 20mg tablet  o Advair 250-50 mcg/dose inhaler  o Duoneb solution  o Invega 9mg tablet    Patient received triamcinolone 40mg injection on 8/2/21 and stated physician increased Novolog to 10 units 3 times daily with meals for 14 days.    Pt also states forgetting Lantus injection occasionally (last taken 8/7)    Prior to Admission medications    Medication Sig Last Dose Taking? Auth Provider   acetaminophen (TYLENOL) 650 MG CR tablet Take 1 tablet (650 mg) by mouth every 8 hours as needed for mild pain or fever 8/8/2021 Yes Rowena Haas APRN CNP   amantadine (SYMMETREL) 100 MG capsule Take 1 capsule (100 mg)  in the morning and 2 capsules (200 mg) in the evening. 8/10/2021 at am Yes Rowena Haas APRN CNP   aspirin (ASA) 81 MG EC tablet TAKE 1 TABLET (81MG) BY MOUTH DAILY 8/10/2021 at am Yes Rowena Haas APRN CNP   atorvastatin (LIPITOR) 80 MG tablet TAKE 1 TABLET (80MG) BY MOUTH DAILY 8/10/2021 at am Yes Rowena Haas APRN CNP   benzonatate (TESSALON) 100 MG capsule Take 1 capsule (100 mg) by mouth 3 times daily as needed for cough Past Month Yes Rowena Haas APRN CNP   clonazePAM (KLONOPIN) 0.5 MG tablet Take 1 tablet (0.5 mg) by mouth At Bedtime 8/9/2021 at pm Yes Rowena Haas APRN CNP   COMPOUNDED NON-CONTROLLED SUBSTANCE (CMPD RX) - PHARMACY TO MIX COMPOUNDED MEDICATION Please compound viscous lidocaine 2% and maalox in a 1:1 ratio Please take 15  to 30 ml by mouth every 4-6 hours as needed for abdominal pain Unknown Yes Cuco Stacy MD   escitalopram (LEXAPRO) 10 MG tablet Take 1 tablet (10 mg) by mouth daily 8/9/2021 at pm Yes Rowena Haas APRN CNP   gabapentin (NEURONTIN) 300 MG capsule Take 1 capsule (300 mg) by mouth At Bedtime 8/9/2021 at pm Yes Albino Rainey MD   hydrOXYzine (VISTARIL) 25 MG capsule Take 1 capsule (25 mg) by mouth every 4 hours as needed for anxiety 8/10/2021 at am Yes Rowena Haas APRN CNP   insulin aspart (NOVOLOG FLEXPEN) 100 UNIT/ML pen Inject 6 Units Subcutaneous 3 times daily (with meals) 8/10/2021 at am Yes Rowena Haas APRN CNP   insulin glargine (LANTUS PEN) 100 UNIT/ML pen Inject 35 Units Subcutaneous 2 times daily 8/7/2021 Yes Rowena Haas APRN CNP   losartan (COZAAR) 100 MG tablet TAKE 1 TABLET (100MG) BY MOUTH DAILY 8/10/2021 at am Yes Antionette Campo APRN CNP   metoprolol succinate ER (TOPROL-XL) 25 MG 24 hr tablet Take 2 tablets (50 mg) by mouth daily 8/10/2021 at am Yes Rowena Haas APRN CNP   nystatin (MYCOSTATIN) 963965 UNIT/GM external powder Apply topically 2 times daily as needed More than a month Yes Rowena Haas APRN CNP   ondansetron (ZOFRAN) 4 MG tablet Take 1 tablet (4 mg) by mouth every 8 hours as needed for nausea Unknown Yes Kelle Rae PA-C   paliperidone ER (INVEGA) 9 MG 24 hr tablet Take 1 tablet (9 mg) by mouth At Bedtime Unknown Yes Rowena Haas APRN CNP   pantoprazole (PROTONIX) 40 MG EC tablet Take 1 tablet (40 mg) by mouth daily 8/10/2021 at am Yes Rowena Haas APRN CNP   senna-docusate (SENOKOT-S/PERICOLACE) 8.6-50 MG tablet Take 1 tablet by mouth 2 times daily as needed for constipation More than a month Yes Rowena Haas, APRN CNP   sertraline (ZOLOFT) 100 MG tablet Take 200 mg by mouth daily  8/10/2021 at am Yes Reported, Patient   topiramate (TOPAMAX) 200 MG tablet Take 1 tablet (200 mg) by mouth 2 times daily .  Titrate to this dose as instructed in clinic. Note change in pill size. 8/10/2021 at am Yes Ruba Paz MD   traZODone (DESYREL) 100 MG tablet Take 1 tablet (100 mg) by mouth nightly as needed for sleep 8/9/2021 at pm Yes Rowena Haas APRN CNP   TRULICITY 1.5 MG/0.5ML pen Inject 1.5 mg Subcutaneous once a week Every Friday 8/6/2021 Yes Rowena Haas APRN CNP   alcohol swab prep pads Use to swab area of injection/rodolfo as directed.   Rowena Haas APRN CNP   blood glucose (NO BRAND SPECIFIED) test strip Use to test blood sugar 4 times daily or as directed.   Rowena Haas APRN CNP   Continuous Blood Gluc Sensor (FREESTYLE LEBRON 14 DAY SENSOR) MISC 1 Device every 14 days Change sensor as directed every 14 days   Rowena Haas APRN CNP   famotidine (PEPCID) 20 MG tablet Take 1 tablet (20 mg) by mouth daily  Patient not taking: Reported on 8/10/2021 Unknown  Rowena Haas APRN CNP   fluticasone-salmeterol (ADVAIR) 250-50 MCG/DOSE inhaler Inhale 1 puff into the lungs every 12 hours as needed  Patient not taking: Reported on 8/10/2021 Unknown  Reported, Patient   insulin pen needle (NOVOFINE 30) 30G X 8 MM miscellaneous USE 4 DAILY OR AS DIRECTED   Rowena Haas APRN CNP   ipratropium - albuterol 0.5 mg/2.5 mg/3 mL (DUONEB) 0.5-2.5 (3) MG/3ML neb solution Take 1 vial (3 mLs) by nebulization every 6 hours as needed for shortness of breath / dyspnea or wheezing  Patient not taking: Reported on 8/10/2021 Not Taking  Dede Adams APRN CNP   order for DME Equipment being ordered: Depends.   Rowena Haas APRN CNP   order for DME Equipment being ordered: CPAP Supplies.   Rowena Haas APRN CNP   thin (NO BRAND SPECIFIED) lancets Use with lanceting device. To accompany: Blood Glucose Monitor Brands: per insurance.   Rowena Haas, APRN CNP   zinc oxide (DESITIN) 40 % external ointment Apply topically as needed for dry skin or irritation  Patient not taking:  Reported on 8/10/2021 Not Taking  Dede Adams APRN CNP         Date completed: 08/10/21    Medication history completed by: Brooke Swann

## 2021-08-11 LAB
ALBUMIN SERPL-MCNC: 3 G/DL (ref 3.4–5)
ALP SERPL-CCNC: 104 U/L (ref 40–150)
ALT SERPL W P-5'-P-CCNC: 23 U/L (ref 0–50)
ANION GAP SERPL CALCULATED.3IONS-SCNC: 6 MMOL/L (ref 3–14)
AST SERPL W P-5'-P-CCNC: 13 U/L (ref 0–45)
ATRIAL RATE - MUSE: 99 BPM
BACTERIA UR CULT: NORMAL
BILIRUB SERPL-MCNC: 0.2 MG/DL (ref 0.2–1.3)
BUN SERPL-MCNC: 15 MG/DL (ref 7–30)
C DIFF TOX B STL QL: NEGATIVE
CALCIUM SERPL-MCNC: 8.6 MG/DL (ref 8.5–10.1)
CHLORIDE BLD-SCNC: 114 MMOL/L (ref 94–109)
CO2 SERPL-SCNC: 20 MMOL/L (ref 20–32)
CREAT SERPL-MCNC: 0.86 MG/DL (ref 0.52–1.04)
DIASTOLIC BLOOD PRESSURE - MUSE: NORMAL MMHG
ERYTHROCYTE [DISTWIDTH] IN BLOOD BY AUTOMATED COUNT: 12.4 % (ref 10–15)
GFR SERPL CREATININE-BSD FRML MDRD: 74 ML/MIN/1.73M2
GLUCOSE BLD-MCNC: 155 MG/DL (ref 70–99)
GLUCOSE BLDC GLUCOMTR-MCNC: 133 MG/DL (ref 70–99)
GLUCOSE BLDC GLUCOMTR-MCNC: 156 MG/DL (ref 70–99)
GLUCOSE BLDC GLUCOMTR-MCNC: 169 MG/DL (ref 70–99)
GLUCOSE BLDC GLUCOMTR-MCNC: 214 MG/DL (ref 70–99)
GLUCOSE BLDC GLUCOMTR-MCNC: 231 MG/DL (ref 70–99)
GLUCOSE BLDC GLUCOMTR-MCNC: 232 MG/DL (ref 70–99)
GLUCOSE BLDC GLUCOMTR-MCNC: 233 MG/DL (ref 70–99)
GLUCOSE BLDC GLUCOMTR-MCNC: 247 MG/DL (ref 70–99)
GLUCOSE BLDC GLUCOMTR-MCNC: 250 MG/DL (ref 70–99)
HCT VFR BLD AUTO: 31.5 % (ref 35–47)
HGB BLD-MCNC: 10.2 G/DL (ref 11.7–15.7)
INTERPRETATION ECG - MUSE: NORMAL
MCH RBC QN AUTO: 31.5 PG (ref 26.5–33)
MCHC RBC AUTO-ENTMCNC: 32.4 G/DL (ref 31.5–36.5)
MCV RBC AUTO: 97 FL (ref 78–100)
P AXIS - MUSE: 43 DEGREES
PLATELET # BLD AUTO: 183 10E3/UL (ref 150–450)
POTASSIUM BLD-SCNC: 3.6 MMOL/L (ref 3.4–5.3)
PR INTERVAL - MUSE: 170 MS
PROT SERPL-MCNC: 6.6 G/DL (ref 6.8–8.8)
QRS DURATION - MUSE: 92 MS
QT - MUSE: 350 MS
QTC - MUSE: 449 MS
R AXIS - MUSE: -55 DEGREES
RBC # BLD AUTO: 3.24 10E6/UL (ref 3.8–5.2)
SODIUM SERPL-SCNC: 140 MMOL/L (ref 133–144)
SYSTOLIC BLOOD PRESSURE - MUSE: NORMAL MMHG
T AXIS - MUSE: 14 DEGREES
VENTRICULAR RATE- MUSE: 99 BPM
WBC # BLD AUTO: 6.5 10E3/UL (ref 4–11)

## 2021-08-11 PROCEDURE — 99207 PR CDG-CODE CATEGORY CHANGED: CPT | Performed by: HOSPITALIST

## 2021-08-11 PROCEDURE — 96372 THER/PROPH/DIAG INJ SC/IM: CPT | Performed by: INTERNAL MEDICINE

## 2021-08-11 PROCEDURE — 258N000003 HC RX IP 258 OP 636: Performed by: EMERGENCY MEDICINE

## 2021-08-11 PROCEDURE — 250N000013 HC RX MED GY IP 250 OP 250 PS 637: Performed by: INTERNAL MEDICINE

## 2021-08-11 PROCEDURE — 82040 ASSAY OF SERUM ALBUMIN: CPT | Performed by: INTERNAL MEDICINE

## 2021-08-11 PROCEDURE — 99226 PR SUBSEQUENT OBSERVATION CARE,LEVEL III: CPT | Performed by: HOSPITALIST

## 2021-08-11 PROCEDURE — 36415 COLL VENOUS BLD VENIPUNCTURE: CPT | Performed by: INTERNAL MEDICINE

## 2021-08-11 PROCEDURE — 96372 THER/PROPH/DIAG INJ SC/IM: CPT

## 2021-08-11 PROCEDURE — 999N000147 HC STATISTIC PT IP EVAL DEFER

## 2021-08-11 PROCEDURE — 87493 C DIFF AMPLIFIED PROBE: CPT | Mod: XU | Performed by: HOSPITALIST

## 2021-08-11 PROCEDURE — 87506 IADNA-DNA/RNA PROBE TQ 6-11: CPT | Performed by: HOSPITALIST

## 2021-08-11 PROCEDURE — 85027 COMPLETE CBC AUTOMATED: CPT | Performed by: INTERNAL MEDICINE

## 2021-08-11 PROCEDURE — G0378 HOSPITAL OBSERVATION PER HR: HCPCS

## 2021-08-11 RX ADMIN — TOPIRAMATE 200 MG: 100 TABLET, FILM COATED ORAL at 08:05

## 2021-08-11 RX ADMIN — CLONAZEPAM 0.5 MG: 0.5 TABLET ORAL at 22:29

## 2021-08-11 RX ADMIN — ESCITALOPRAM OXALATE 10 MG: 10 TABLET ORAL at 08:06

## 2021-08-11 RX ADMIN — AMANTADINE HYDROCHLORIDE 200 MG: 100 CAPSULE ORAL at 19:31

## 2021-08-11 RX ADMIN — METOPROLOL SUCCINATE 50 MG: 50 TABLET, EXTENDED RELEASE ORAL at 08:06

## 2021-08-11 RX ADMIN — PALIPERIDONE 9 MG: 9 TABLET, EXTENDED RELEASE ORAL at 22:29

## 2021-08-11 RX ADMIN — GABAPENTIN 300 MG: 300 CAPSULE ORAL at 22:29

## 2021-08-11 RX ADMIN — TOPIRAMATE 200 MG: 100 TABLET, FILM COATED ORAL at 19:31

## 2021-08-11 RX ADMIN — ATORVASTATIN CALCIUM 80 MG: 40 TABLET, FILM COATED ORAL at 08:06

## 2021-08-11 RX ADMIN — SODIUM CHLORIDE: 9 INJECTION, SOLUTION INTRAVENOUS at 04:17

## 2021-08-11 RX ADMIN — PANTOPRAZOLE SODIUM 40 MG: 40 TABLET, DELAYED RELEASE ORAL at 08:06

## 2021-08-11 RX ADMIN — HYDRALAZINE HYDROCHLORIDE 25 MG: 25 TABLET ORAL at 16:49

## 2021-08-11 RX ADMIN — SERTRALINE HYDROCHLORIDE 200 MG: 100 TABLET ORAL at 08:05

## 2021-08-11 RX ADMIN — FAMOTIDINE 20 MG: 20 TABLET ORAL at 08:06

## 2021-08-11 RX ADMIN — INSULIN ASPART 2 UNITS: 100 INJECTION, SOLUTION INTRAVENOUS; SUBCUTANEOUS at 03:07

## 2021-08-11 RX ADMIN — INSULIN ASPART 3 UNITS: 100 INJECTION, SOLUTION INTRAVENOUS; SUBCUTANEOUS at 15:13

## 2021-08-11 RX ADMIN — AMANTADINE HYDROCHLORIDE 100 MG: 100 CAPSULE ORAL at 08:05

## 2021-08-11 RX ADMIN — INSULIN ASPART 2 UNITS: 100 INJECTION, SOLUTION INTRAVENOUS; SUBCUTANEOUS at 11:06

## 2021-08-11 RX ADMIN — INSULIN GLARGINE 35 UNITS: 100 INJECTION, SOLUTION SUBCUTANEOUS at 19:25

## 2021-08-11 RX ADMIN — INSULIN ASPART 3 UNITS: 100 INJECTION, SOLUTION INTRAVENOUS; SUBCUTANEOUS at 22:29

## 2021-08-11 RX ADMIN — INSULIN ASPART 1 UNITS: 100 INJECTION, SOLUTION INTRAVENOUS; SUBCUTANEOUS at 06:05

## 2021-08-11 RX ADMIN — ASPIRIN 81 MG: 81 TABLET, DELAYED RELEASE ORAL at 08:06

## 2021-08-11 RX ADMIN — HYDRALAZINE HYDROCHLORIDE 25 MG: 25 TABLET ORAL at 22:39

## 2021-08-11 RX ADMIN — INSULIN ASPART 2 UNITS: 100 INJECTION, SOLUTION INTRAVENOUS; SUBCUTANEOUS at 19:23

## 2021-08-11 RX ADMIN — INSULIN GLARGINE 35 UNITS: 100 INJECTION, SOLUTION SUBCUTANEOUS at 08:08

## 2021-08-11 RX ADMIN — SODIUM CHLORIDE: 9 INJECTION, SOLUTION INTRAVENOUS at 19:35

## 2021-08-11 NOTE — PROGRESS NOTES
[facility], [place], [place]  Internal Medicine Progress Note    Assessment and Plans:     Nena Tang is a 61 year old female with a past medical history significant for type 2 diabetes, coronary artery disease, hypertension, and schizoaffective disorder who presents to the Emergency Department for evaluation of dizziness.     She was found to be hyperglycemic and fatigued. She will be admitted under observation for re- adjusting insulin regimen, diabetic teaching and CC consultation  Individual problems and their management are outlined below     #DM Type 2, Uncontrolled, A1C at 10.0  #Insulin regimen Non compliance  #Acute on chronic hyperglycemia  #Fatigue and dizziness secondary to above    Hyperglycemia likley secondary to medication noncompliance and recent steroid injection in her hip(Per patient report).  No other obvious explanation. No infection.   No evidence of DKA.   No new meds. No steroids.   Patient's home regimen included 35 units of Lantus BID and fixed meal time insulin ( used to be 7 units TID. May have increased to 10 units) and well as Liraglutide 1.2 units  Patient expresses confusion and frustration with her insulin regimen, especially in the setting of CNS relating medicants as well  Unknown compliance. It does not appear that she may be giving herself long acting insulin on a regular basis   For now, restart home insulin regime and correct high glucose levels with short acting insulin  Diabetic educator consult  As well a CC consult for home care/ home nurse to monitor medication   Consult endocrine team as well.   Hx of Lactic Acidosis with metformin, so has been off that.      #KATY:    Creatinine at 1.31. Urine analysis with no significant RBC's or casts  Secondary to poor fluid intake in the setting  hyperglycemia  Short trail of IV maintenance fluids  Hold Losartan for now   Recheck creatinine in the morning       #CAD  #Dyslipidemia   #HTN    Stable  Resume Metoprolol 100 mg daily  Resume Aspirin 81 mg and atorvastatin 80 mg     #Schizoaffective disorder  #Depression    Resume Prozac 10 mg daily  Invega 9 mg at bedtime  Amantadine 00 mg am and 200 mg PM     #Fibromyalgia:  Resume Topiramate 200 mg BID  Gabapentin 300 mg at bed time      #Pseudohyponatremia @ 129 on Admit => Resolved.   Pseudohyponatremia due to high glucose     # Diarrhea: Get  Enteric panel and c diff. If  c diff negative, give imodium as needed.      Diet:   Regular adult   DVT Prophylaxis: Low Risk/Ambulatory with no VTE prophylaxis indicated ( expected to discharge in 24-48 hrs)   Estes Catheter: Not present  Central Lines: None  Code Status:  Full        Alivia Le MD, MPH.  Internal Medicine Attending  [place], [place]    Click on the link below to page me.   Text Page  (7am - 5pm, M-F)    Subjective:      Reports feeling better.    Had 5 loose stools   Mild abdominal cramps.    No nausea or vomiting.    No fevers or chills.    No chest pain or sob.    No cough or phlegm.     -Data reviewed today: I reviewed all new labs and imaging results over the last 24 hours.     Exam:         Temp: 97.4  F (36.3  C) Temp src: Oral BP: (!) 160/71 Pulse: 73   Resp: 18 SpO2: 99 % O2 Device: None (Room air)    There were no vitals filed for this visit.  Vital Signs with Ranges  Temp:  [97.4  F (36.3  C)-98.8  F (37.1  C)] 97.4  F (36.3  C)  Pulse:  [73-91] 73  Resp:  [16-18] 18  BP: (160-181)/(63-82) 160/71  SpO2:  [97 %-99 %] 99 %  No intake/output data recorded.    Constitutional: No distress noted, Alert, Answering questions appropriately  Respiratory: AE is good on both sides, no wheezing onr crackles  Cardiovascular: S1S2 normal, no new murmur  GI: Soft, non tender  Skin/Integumen: No rash      Medications     sodium chloride 125 mL/hr at 08/11/21 0417       amantadine  100 mg Oral Daily     amantadine  200 mg Oral  QPM     aspirin  81 mg Oral Daily     atorvastatin  80 mg Oral Daily     clonazePAM  0.5 mg Oral At Bedtime     escitalopram  10 mg Oral Daily     famotidine  20 mg Oral Daily     gabapentin  300 mg Oral At Bedtime     insulin aspart  1-6 Units Subcutaneous Q4H     insulin aspart   Subcutaneous TID w/meals     insulin glargine  35 Units Subcutaneous BID     metoprolol succinate ER  50 mg Oral Daily     paliperidone ER  9 mg Oral At Bedtime     pantoprazole  40 mg Oral Daily     sertraline  200 mg Oral Daily     topiramate  200 mg Oral BID       Data   Recent Labs   Lab 08/11/21  1104 08/11/21  0937 08/11/21  0817 08/10/21  1349   WBC  --  6.5  --  5.7   HGB  --  10.2*  --  10.9*   MCV  --  97  --  99   PLT  --  183  --  193   NA  --  140  --  129*   POTASSIUM  --  3.6  --  4.0   CHLORIDE  --  114*  --  100   CO2  --  20  --  20   BUN  --  15  --  29   CR  --  0.86  --  1.31*   ANIONGAP  --  6  --  9   ALLEN  --  8.6  --  8.9   * 155* 133* 641*   ALBUMIN  --  3.0*  --  3.4   PROTTOTAL  --  6.6*  --  7.4   BILITOTAL  --  0.2  --  0.2   ALKPHOS  --  104  --  128   ALT  --  23  --  25   AST  --  13  --  9       No results found for this or any previous visit (from the past 24 hour(s)).

## 2021-08-11 NOTE — H&P
[facility]    History and Physical - Hospitalist Service       Date of Admission:  8/10/2021    Assessment & Plan      Nena Tang is a 61 year old female with a past medical history significant for type 2 diabetes, coronary artery disease, hypertension, and schizoaffective disorder who presents to the Emergency Department for evaluation of dizziness.   She was found to be hyperglycemic and fatigued. She will be admitted under observation for re- adjusting insulin regimen, diabetic teaching and CC consultation  Individual problems and their management are outlined below    #DM Type 2, Uncontrolled, A1C at 10.0  #Insulin regimen Non compliance  #Acute on chronic hyperglycemia  #Fatigue and dizziness secondary to above  No obvious causes of hyperglycemia including infectious causes. Hyperglycemia likley secondary to medication noncompliance    No evidence of DKA   Patient's home regimen included 35 units of Lantus BID and fixed meal time insulin ( used to be 7 units TID. May have increased to 10 units) and well as Liraglutide 1.2 unirs  Patient expresses confusion and frustration with her insulin regimen, especially in the setting of CNS relating medicants as well  Unknown compliance. It does not appear that she may be giving herself long acting insulin on a regular basis   For now, restart home insulin regime and correct high glucose levels with short acting insulin  Diabetic educator consult  As well a CC consult for home care/ home nurse to monitor medication     #KATY:  Creatinine at 1.31. Urine analysis with no significant RBC's or casts  Secondary to poor fluid intake in the setting  hyperglycemia  Short trail of IV maintenance fluids  Hold Losartan for now   Recheck creatinine in the morning     #CAD  #Dyslipidemia   #HTN  Stable  Resume Metoprolol 100 mg daily  Resume Aspirin 81 mg and atorvastatin 80 mg    #Schizoaffective disorder  #Depression  Resume Prozac  10 mg daily   Invega 9 mg at bedtime  Amantadine 00 mg am and 200 mg PM      #Fibromyalgia:  Resume Topiramate 200 mg BID  Gabapentin 300 mg at bed time        Hyponatremia @ 129  Falsely low due to high glucose   Correction Na levels is within normal limits        Diet:   Regular adult   DVT Prophylaxis: Low Risk/Ambulatory with no VTE prophylaxis indicated ( expected to discharge in 24-48 hrs)   Estes Catheter: Not present  Central Lines: None  Code Status:  Full     Clinically Significant Risk Factors Present on Admission         # Hyponatremia: Na = 129 mmol/L (Ref range: 133 - 144 mmol/L) on admission, will monitor as appropriate      # Platelet Defect: home medication list includes an antiplatelet medication      Disposition Plan   Expected discharge: 24 hrs recommended to prior living arrangement once diabetic teaching is done and euglycemia is achieved .     The patient's care was discussed with the Bedside Nurse and Patient.    Josh Cuba MD  [facility]  Securely message with the Vocera Web Console (learn more here)  Text page via [employer] Paging/Directory      ______________________________________________________________________    Chief Complaint   Fatigue  Dizziness     History is obtained from the patient and chart review     History of Present Illness   Nena Tang is a 61 year old female with a past medical history significant for type 2 diabetes, coronary artery disease, hypertension, and schizoaffective disorder who presents to the Emergency Department for evaluation of dizziness. Patient felt dizzy, exhausted, and generally unwell at adult  today and was subsequently driven to the ED by adult care staff. She has been feeling this way since last night. Her sister and son are available to be contacted by phone. She reports a blood sugar of 340 last night. Attempts to check her blood sugar in triage resulted in too high to  read. She has not taken Lantus or novolog since Saturday. She has been taking her other home medications as prescribed.  She has received her COVID vaccine. She denies pain or fever.  Per patient, she says that the insulin regimen is very confusing for her and she often misses them   She says that her sister, who lives with her at home helps with the washing, cooking and cleaning, but not with her medicines. Medicines are largely overseen by patient herself  She is aware of most of her medicines, but admits struggling with Insulin dosages  She was mildly drowsy and fatigued when I was talking to patinet  She denies any chest pain or SOB   Denies fevers or chills  Denies nausea, vomiting or diarrhea  Per chart review, patient was seen here in the ED on 7/15/21 for evaluation of an elevated blood sugar of 330 and increased fatigue. She was experiencing associated blurry vision, abdominal pain, shortness of breath, epigastric pain, diarrhea, polyuria, polydipsia, and headache. After 1L of normal saline, her blood sugar was measured at 266. No insulin was given. Chest X-ray was normal. UA with trace leukocyte esterase, but no urinary symptoms. ECG with no acute ischemic changes. Patient was recommended to work with her PCP to help manage her diabetes.       Review of Systems    The 10 point Review of Systems is negative other than noted in the HPI or here.     Past Medical History    I have reviewed this patient's medical history and updated it with pertinent information if needed.   Past Medical History:   Diagnosis Date     Acute respiratory failure with hypoxia (H) 9/4/2017     CAD (coronary artery disease)     5/2014 cath, nonbostructive stenosis to LAD, RCA.     Chronic low back pain 1/22/2013     Cocaine abuse, in remission (H)      Fecal urgency 3/8/2012     History of heroin abuse (H)      Hyperlipidemia LDL goal <100 10/31/2010     Hypertension 7/29/2013     Illiterate 8/30/2011     Irritable bowel syndrome       Left cataract      Migraine 4/19/2012     Migraine headache 4/22/2013     Moderate major depression (H) 6/8/2011     Noncompliance with medication regimen 6/8/2011     Obesity      CINDY (obstructive sleep apnea) 3/8/2012    uses CPAP     Osteopenia 10/7/2009     Pneumonia of right lower lobe due to infectious organism 9/4/2017     Schizoaffective disorder, depressive type (H) 2/25/2013     Sepsis (H) 8/29/2017     Suicidal intent 10/2/2013     Takotsubo cardiomyopathy      Type 2 diabetes mellitus (H) 8/30/2011     Uterine cancer (H) 1983     Verbal auditory hallucination 10/4/2012       Past Surgical History   I have reviewed this patient's surgical history and updated it with pertinent information if needed.  Past Surgical History:   Procedure Laterality Date     C OOPHORECTORMY FOR MALIG, W/BX  1983    UTERINE     CATARACT IOL, RT/LT Bilateral 2017     CHOLECYSTECTOMY       COLONOSCOPY N/A 3/16/2017    Procedure: COLONOSCOPY;  Surgeon: Traci Gonzalez MD;  Location:  GI     Coronary CTA  5/21/2014     HYSTERECTOMY  1983    uterine cancer yearly pap's per provider.     HYSTERECTOMY       LAPAROSCOPIC CHOLECYSTECTOMY  2008     PHACOEMULSIFICATION CLEAR CORNEA WITH STANDARD INTRAOCULAR LENS IMPLANT Left 5/5/2017    Procedure: PHACOEMULSIFICATION CLEAR CORNEA WITH STANDARD INTRAOCULAR LENS IMPLANT;  LEFT EYE PHACOEMULSIFICATION CLEAR CORNEA WITH STANDARD INTRAOCULAR LENS IMPLANT ;  Surgeon: Tyra Diaz MD;  Location: [facility]     PHACOEMULSIFICATION CLEAR CORNEA WITH STANDARD INTRAOCULAR LENS IMPLANT Right 6/30/2017    Procedure: PHACOEMULSIFICATION CLEAR CORNEA WITH STANDARD INTRAOCULAR LENS IMPLANT;  RIGHT EYE PHACOEMULSIFICATION CLEAR CORNEA WITH STANDARD INTRAOCULAR LENS IMPLANT;  Surgeon: Tyra Diaz MD;  Location: [facility]     RELEASE TRIGGER FINGER  10/11/2012    Left thumb. Procedure: RELEASE TRIGGER FINGER;  LEFT THUMB TRIGGER RELEASE;  Surgeon: Tay Langley MD;  Location: [facility]      RELEASE TRIGGER FINGER Right 2016    Procedure: RELEASE TRIGGER FINGER;  Surgeon: Albino Castañeda MD;  Location: [facility] OR       Social History   I have reviewed this patient's social history and updated it with pertinent information if needed.  Social History     Tobacco Use     Smoking status: Never Smoker     Smokeless tobacco: Never Used   Substance Use Topics     Alcohol use: No     Comment: last month     Drug use: No     Comment: history of       Family History   I have reviewed this patient's family history and updated it with pertinent information if needed.  Family History   Problem Relation Age of Onset     Cancer Mother         BLADDER     Respiratory Mother         COPD     Gastrointestinal Disease Mother         CIRRHOSIS OF LI BOLIVAR     Alcohol/Drug Mother      Diabetes Mother      Hypertension Mother      Lipids Mother      C.A.D. Mother      Glaucoma Mother      Alcohol/Drug Sister      Mental Illness Sister      Alcohol/Drug Sister      Psychotic Disorder Sister      Cancer Maternal Grandmother         UNKNOWN TYPE     Cancer Brother         COLON     Cancer - colorectal Brother         IN HIS LATE 30S     Alcohol/Drug Brother          OF HEROIN OVERDOSE AT AGE 22 YRS     Macular Degeneration No family hx of        Prior to Admission Medications   Prior to Admission Medications   Prescriptions Last Dose Informant Patient Reported? Taking?   COMPOUNDED NON-CONTROLLED SUBSTANCE (CMPD RX) - PHARMACY TO MIX COMPOUNDED MEDICATION Unknown  No Yes   Sig: Please compound viscous lidocaine 2% and maalox in a 1:1 ratio Please take 15 to 30 ml by mouth every 4-6 hours as needed for abdominal pain   Continuous Blood Gluc Sensor (FREESTYLE LEBRON 14 DAY SENSOR) MISC   No No   Si Device every 14 days Change sensor as directed every 14 days   TRULICITY 1.5 MG/0.5ML pen 2021  No Yes   Sig: Inject 1.5 mg Subcutaneous once a week Every Friday   acetaminophen (TYLENOL) 650 MG CR tablet 2021  No  Yes   Sig: Take 1 tablet (650 mg) by mouth every 8 hours as needed for mild pain or fever   alcohol swab prep pads   No No   Sig: Use to swab area of injection/rodolfo as directed.   amantadine (SYMMETREL) 100 MG capsule 8/10/2021 at am  No Yes   Sig: Take 1 capsule (100 mg)  in the morning and 2 capsules (200 mg) in the evening.   aspirin (ASA) 81 MG EC tablet 8/10/2021 at am  No Yes   Sig: TAKE 1 TABLET (81MG) BY MOUTH DAILY   atorvastatin (LIPITOR) 80 MG tablet 8/10/2021 at am  No Yes   Sig: TAKE 1 TABLET (80MG) BY MOUTH DAILY   benzonatate (TESSALON) 100 MG capsule Past Month  No Yes   Sig: Take 1 capsule (100 mg) by mouth 3 times daily as needed for cough   blood glucose (NO BRAND SPECIFIED) test strip   No No   Sig: Use to test blood sugar 4 times daily or as directed.   clonazePAM (KLONOPIN) 0.5 MG tablet 8/9/2021 at pm  No Yes   Sig: Take 1 tablet (0.5 mg) by mouth At Bedtime   escitalopram (LEXAPRO) 10 MG tablet 8/9/2021 at pm  No Yes   Sig: Take 1 tablet (10 mg) by mouth daily   famotidine (PEPCID) 20 MG tablet Unknown  No No   Sig: Take 1 tablet (20 mg) by mouth daily   Patient not taking: Reported on 8/10/2021   fluticasone-salmeterol (ADVAIR) 250-50 MCG/DOSE inhaler Unknown  Yes No   Sig: Inhale 1 puff into the lungs every 12 hours as needed   Patient not taking: Reported on 8/10/2021   gabapentin (NEURONTIN) 300 MG capsule 8/9/2021 at pm  No Yes   Sig: Take 1 capsule (300 mg) by mouth At Bedtime   hydrOXYzine (VISTARIL) 25 MG capsule 8/10/2021 at am  No Yes   Sig: Take 1 capsule (25 mg) by mouth every 4 hours as needed for anxiety   insulin aspart (NOVOLOG FLEXPEN) 100 UNIT/ML pen 8/10/2021 at am  No Yes   Sig: Inject 6 Units Subcutaneous 3 times daily (with meals)   insulin glargine (LANTUS PEN) 100 UNIT/ML pen 8/7/2021  No Yes   Sig: Inject 35 Units Subcutaneous 2 times daily   insulin pen needle (NOVOFINE 30) 30G X 8 MM miscellaneous   No No   Sig: USE 4 DAILY OR AS DIRECTED   ipratropium - albuterol  0.5 mg/2.5 mg/3 mL (DUONEB) 0.5-2.5 (3) MG/3ML neb solution Not Taking  No No   Sig: Take 1 vial (3 mLs) by nebulization every 6 hours as needed for shortness of breath / dyspnea or wheezing   Patient not taking: Reported on 8/10/2021   losartan (COZAAR) 100 MG tablet 8/10/2021 at am  No Yes   Sig: TAKE 1 TABLET (100MG) BY MOUTH DAILY   metoprolol succinate ER (TOPROL-XL) 25 MG 24 hr tablet 8/10/2021 at am  No Yes   Sig: Take 2 tablets (50 mg) by mouth daily   nystatin (MYCOSTATIN) 800457 UNIT/GM external powder More than a month  No Yes   Sig: Apply topically 2 times daily as needed   ondansetron (ZOFRAN) 4 MG tablet Unknown  No Yes   Sig: Take 1 tablet (4 mg) by mouth every 8 hours as needed for nausea   order for DME   No No   Sig: Equipment being ordered: CPAP Supplies.   order for DME   No No   Sig: Equipment being ordered: Depends.   paliperidone ER (INVEGA) 9 MG 24 hr tablet Unknown  No Yes   Sig: Take 1 tablet (9 mg) by mouth At Bedtime   pantoprazole (PROTONIX) 40 MG EC tablet 8/10/2021 at am  No Yes   Sig: Take 1 tablet (40 mg) by mouth daily   senna-docusate (SENOKOT-S/PERICOLACE) 8.6-50 MG tablet More than a month  No Yes   Sig: Take 1 tablet by mouth 2 times daily as needed for constipation   sertraline (ZOLOFT) 100 MG tablet 8/10/2021 at am  Yes Yes   Sig: Take 200 mg by mouth daily    thin (NO BRAND SPECIFIED) lancets   No No   Sig: Use with lanceting device. To accompany: Blood Glucose Monitor Brands: per insurance.   topiramate (TOPAMAX) 200 MG tablet 8/10/2021 at am  No Yes   Sig: Take 1 tablet (200 mg) by mouth 2 times daily . Titrate to this dose as instructed in clinic. Note change in pill size.   traZODone (DESYREL) 100 MG tablet 8/9/2021 at pm  No Yes   Sig: Take 1 tablet (100 mg) by mouth nightly as needed for sleep   zinc oxide (DESITIN) 40 % external ointment Not Taking  No No   Sig: Apply topically as needed for dry skin or irritation   Patient not taking: Reported on 8/10/2021       Facility-Administered Medications: None     Allergies   Allergies   Allergen Reactions     Imidazole Antifungals Hives     Tolerates diflucan     Ketoprofen Itching     Pruritis to topical     Lisinopril Hives     Metformin Other (See Comments)     Patient hospitalized for lactic acidosis - admitting provider suspectd caused by metformin     Metronidazole Hives     Posaconazole Hives     Tolerates diflucan       Physical Exam   Vital Signs: Temp: 97.5  F (36.4  C) Temp src: Oral BP: (!) 179/82 Pulse: 91   Resp: 16 SpO2: 97 % O2 Device: None (Room air)    Weight: 0 lbs 0 oz    Constitutional: fatigued, alert, cooperative, no apparent distress, appears stated age and moderately obese  Eyes: Lids and lashes normal, pupils equal, round and reactive to light, extra ocular muscles intact, sclera clear, conjunctiva normal  ENT: Normocephalic, without obvious abnormality, atraumatic, sinuses nontender on palpation, external ears without lesions, oral pharynx with moist mucous membranes, tonsils without erythema or exudates, gums normal and good dentition.  Respiratory: No increased work of breathing, good air exchange, clear to auscultation bilaterally, no crackles or wheezing  Cardiovascular: Normal apical impulse, regular rate and rhythm, normal S1 and S2, no S3 or S4, and no murmur noted  GI: No scars, normal bowel sounds, soft, non-distended, non-tender, no masses palpated, no hepatosplenomegally  Skin: no bruising or bleeding  Musculoskeletal: no lower extremity pitting edema present  Neurologic: Awake, alert, oriented to name, place and time.  Cranial nerves II-XII are grossly intact.  Motor is 5 out of 5 bilaterally.  Cerebellar finger to nose, heel to shin intact.  Sensory is intact.  Babinski down going, Romberg negative, and gait is normal.    Data   Data reviewed today: I reviewed all medications, new labs and imaging results over the last 24 hours. I personally reviewed the EKG tracing showing NSR. Left axis  deviation present from previous EKG. No ST or T wave changes .    Recent Labs   Lab 08/10/21  1954 08/10/21  1541 08/10/21  1349   WBC  --   --  5.7   HGB  --   --  10.9*   MCV  --   --  99   PLT  --   --  193   NA  --   --  129*   POTASSIUM  --   --  4.0   CHLORIDE  --   --  100   CO2  --   --  20   BUN  --   --  29   CR  --   --  1.31*   ANIONGAP  --   --  9   ALLEN  --   --  8.9   * 489* 641*   ALBUMIN  --   --  3.4   PROTTOTAL  --   --  7.4   BILITOTAL  --   --  0.2   ALKPHOS  --   --  128   ALT  --   --  25   AST  --   --  9

## 2021-08-11 NOTE — PLAN OF CARE
Patient A/Ox4. VSS ex BP elevated, MD cross-coverage aware and ordered PRN hydralazine. Denies CP, SOB, dizziness/LH. LSCTA. +fl/BS, had a couple of medium/small BMs this shift. Voiding well in bathroom, has incontinence brief as well. CMS intact. Tolerating regular diet without NV, has carb coverage. Activity level is good, pt walked to bathroom x2. IV running continuously. Denied pain. Observation, likely discharge today. BG Q4H with coverage. Patient has demonstrated ability to call appropriately. Patient is resting with call light within reach. Will continue to monitor.

## 2021-08-11 NOTE — CONSULTS
Care Management Initial Consult    General Information  Assessment completed with:  ,    Type of CM/SW Visit: Offer D/C Planning    Primary Care Provider verified and updated as needed: Yes   Readmission within the last 30 days:           Advance Care Planning:            Communication Assessment  Patient's communication style: spoken language (English or Bilingual)             Cognitive  Cognitive/Neuro/Behavioral: .WDL except  Level of Consciousness: alert  Arousal Level: opens eyes spontaneously  Orientation: oriented x 4  Mood/Behavior: calm, cooperative  Best Language: 0 - No aphasia  Speech: slow, slurred    Living Environment:   People in home: sibling(s)  April  Current living Arrangements: house      Able to return to prior arrangements: yes       Family/Social Support:  Care provided by: self, other (see comments)  Provides care for:    Marital Status: Single  Sibling(s)          Description of Support System: Supportive, Involved    Support Assessment: Adequate family and caregiver support    Current Resources:   Patient receiving home care services:       Community Resources:    Equipment currently used at home:    Supplies currently used at home:      Employment/Financial:  Employment Status:          Financial Concerns:             Lifestyle & Psychosocial Needs:  Social Determinants of Health     Tobacco Use: Low Risk      Smoking Tobacco Use: Never Smoker     Smokeless Tobacco Use: Never Used   Alcohol Use:      Frequency of Alcohol Consumption:      Average Number of Drinks:      Frequency of Binge Drinking:    Financial Resource Strain:      Difficulty of Paying Living Expenses:    Food Insecurity:      Worried About Running Out of Food in the Last Year:      Ran Out of Food in the Last Year:    Transportation Needs:      Lack of Transportation (Medical):      Lack of Transportation (Non-Medical):    Physical Activity:      Days of Exercise per Week:      Minutes of Exercise per Session:    Stress:       Feeling of Stress :    Social Connections:      Frequency of Communication with Friends and Family:      Frequency of Social Gatherings with Friends and Family:      Attends Confucianist Services:      Active Member of Clubs or Organizations:      Attends Club or Organization Meetings:      Marital Status:    Intimate Partner Violence:      Fear of Current or Ex-Partner:      Emotionally Abused:      Physically Abused:      Sexually Abused:    Depression: Not at risk     PHQ-2 Score: 2   Housing Stability:      Unable to Pay for Housing in the Last Year:      Number of Places Lived in the Last Year:      Unstable Housing in the Last Year:        Functional Status:  Prior to admission patient needed assistance:       Lives with sister who gives 24/7 care.       Mental Health Status:        Schizophrenia    Chemical Dependency Status:                Values/Beliefs:  Spiritual, Cultural Beliefs, Confucianist Practices, Values that affect care:                 Additional Information:  Plan for patient to discharge to home with family member who provides 24/7 cares. No home care or outpatient therapies recommended at this time. RNCC available as needed.    Tanvi Salinas RN, BSN  Care Coordinator, [facility]  Phone [phone]  Pager [phone]

## 2021-08-11 NOTE — PLAN OF CARE
VS: Hypertensive- prn hydralazine given x2. MD paged regarding BP's 180/68 and 180/69 at 2230  Blood pressure (!) 180/69, pulse 71, temperature (!) 96.3  F (35.7  C), temperature source Oral, resp. rate 16, last menstrual period 01/06/2015, SpO2 98 %, not currently breastfeeding.     O2: >95% on RA   Output: Voids spontaneously, up to bathroom  Continent for most of shift, uses call light appropriately when she needs to go to the bathroom    Last BM: 1 episode of diarrhea this evening  C.diff test negative   Pending enteric bacteria/virus panel    Activity: SBA  Walks slow, slight shuffle but steady    Skin: R shin and abdominal bruising  R shin scab   Pain: Denies   Neuro/CMS: A+Ox4. Slow to respond but alert.   CMS intact. Denies numbness and tingling.    Dressing: None   Diet: Regular  Needs help ordering  BG before meals and q4h   LDA: Loss of PIV access   Equipment: IV pump/pole   Plan: TBD   Additional Info:

## 2021-08-11 NOTE — PLAN OF CARE
PT [facility]: PT EVAL CX AND DEFER- met with pt to discuss and screen PT needs. Pt is near her baseline despite some mild fatigue 2/2 diarrhea. She is Rigo with 4WW and able to ambulate x100', no LOB throughout session, uses walker at baseline. Pt lives with sister who provides near 24/7 care, assists with ADLs. Pt is near baseline, safe to return home when medically ready. Walker ordered up to room. Will complete PT orders.

## 2021-08-11 NOTE — PLAN OF CARE
VS: /62 (BP Location: Right arm)   Pulse 77   Temp 97.4  F (36.3  C) (Oral)   Resp 18   LMP 01/06/2015 (Exact Date)   SpO2 99%   Breastfeeding No   Denies SOB and chest pain    O2: 99% at room air   Output: Uses incontinence brief     Last BM: 4 loose stool this shift    Activity: Standby assist, walker, gait belt    Up for meals Sat up in bed    Skin: Intact    Pain: denies   CMS: intact   Dressing: None    Diet: Regular , BG check before meal and bedtime   With carb coverage  Every 4 hours Novolog Insulin    LDA: R PIV infusing with NS 125ml/hr    Equipment: IV pole and pump, call light   Plan: Continue to monitor blood sugar    Additional Info: Patient has generalized fatigue  Call light within reach   Labs ordered for C. Diff and Enteric Bacteria & virus Panel

## 2021-08-11 NOTE — PROGRESS NOTES
Care Management Follow Up    Per chart review no home care or outpatient therapies indicated at this time. RNCC available as needed.    Tanvi Salinas RN, BSN  Care Coordinator, [facility]  Phone [phone]  Pager [phone]

## 2021-08-12 LAB
C COLI+JEJUNI+LARI FUSA STL QL NAA+PROBE: NOT DETECTED
EC STX1 GENE STL QL NAA+PROBE: NOT DETECTED
EC STX2 GENE STL QL NAA+PROBE: NOT DETECTED
GLUCOSE BLDC GLUCOMTR-MCNC: 176 MG/DL (ref 70–99)
GLUCOSE BLDC GLUCOMTR-MCNC: 179 MG/DL (ref 70–99)
GLUCOSE BLDC GLUCOMTR-MCNC: 197 MG/DL (ref 70–99)
GLUCOSE BLDC GLUCOMTR-MCNC: 201 MG/DL (ref 70–99)
GLUCOSE BLDC GLUCOMTR-MCNC: 251 MG/DL (ref 70–99)
GLUCOSE BLDC GLUCOMTR-MCNC: 317 MG/DL (ref 70–99)
NOROV GI+II ORF1-ORF2 JNC STL QL NAA+PR: NOT DETECTED
RVA NSP5 STL QL NAA+PROBE: NOT DETECTED
SALMONELLA SP RPOD STL QL NAA+PROBE: NOT DETECTED
SHIGELLA SP+EIEC IPAH STL QL NAA+PROBE: NOT DETECTED
V CHOL+PARA RFBL+TRKH+TNAA STL QL NAA+PR: NOT DETECTED
Y ENTERO RECN STL QL NAA+PROBE: NOT DETECTED

## 2021-08-12 PROCEDURE — G0378 HOSPITAL OBSERVATION PER HR: HCPCS

## 2021-08-12 PROCEDURE — 250N000013 HC RX MED GY IP 250 OP 250 PS 637: Performed by: INTERNAL MEDICINE

## 2021-08-12 PROCEDURE — 96372 THER/PROPH/DIAG INJ SC/IM: CPT

## 2021-08-12 PROCEDURE — 96372 THER/PROPH/DIAG INJ SC/IM: CPT | Performed by: EMERGENCY MEDICINE

## 2021-08-12 RX ORDER — ACETAMINOPHEN 325 MG/1
650 TABLET ORAL EVERY 8 HOURS PRN
Status: DISCONTINUED | OUTPATIENT
Start: 2021-08-12 | End: 2021-08-13 | Stop reason: HOSPADM

## 2021-08-12 RX ADMIN — ASPIRIN 81 MG: 81 TABLET, DELAYED RELEASE ORAL at 07:45

## 2021-08-12 RX ADMIN — GABAPENTIN 300 MG: 300 CAPSULE ORAL at 22:35

## 2021-08-12 RX ADMIN — ACETAMINOPHEN 650 MG: 650 TABLET, FILM COATED, EXTENDED RELEASE ORAL at 07:53

## 2021-08-12 RX ADMIN — PANTOPRAZOLE SODIUM 40 MG: 40 TABLET, DELAYED RELEASE ORAL at 07:45

## 2021-08-12 RX ADMIN — INSULIN GLARGINE 35 UNITS: 100 INJECTION, SOLUTION SUBCUTANEOUS at 07:54

## 2021-08-12 RX ADMIN — INSULIN GLARGINE 35 UNITS: 100 INJECTION, SOLUTION SUBCUTANEOUS at 20:54

## 2021-08-12 RX ADMIN — INSULIN ASPART 2 UNITS: 100 INJECTION, SOLUTION INTRAVENOUS; SUBCUTANEOUS at 03:23

## 2021-08-12 RX ADMIN — SERTRALINE HYDROCHLORIDE 200 MG: 100 TABLET ORAL at 07:44

## 2021-08-12 RX ADMIN — AMANTADINE HYDROCHLORIDE 100 MG: 100 CAPSULE ORAL at 07:46

## 2021-08-12 RX ADMIN — ACETAMINOPHEN 650 MG: 325 TABLET, FILM COATED ORAL at 22:35

## 2021-08-12 RX ADMIN — METOPROLOL SUCCINATE 50 MG: 50 TABLET, EXTENDED RELEASE ORAL at 07:44

## 2021-08-12 RX ADMIN — INSULIN ASPART 1 UNITS: 100 INJECTION, SOLUTION INTRAVENOUS; SUBCUTANEOUS at 18:17

## 2021-08-12 RX ADMIN — TOPIRAMATE 100 MG: 100 TABLET, FILM COATED ORAL at 07:45

## 2021-08-12 RX ADMIN — ATORVASTATIN CALCIUM 80 MG: 40 TABLET, FILM COATED ORAL at 07:44

## 2021-08-12 RX ADMIN — INSULIN ASPART 2 UNITS: 100 INJECTION, SOLUTION INTRAVENOUS; SUBCUTANEOUS at 14:55

## 2021-08-12 RX ADMIN — HYDRALAZINE HYDROCHLORIDE 25 MG: 25 TABLET ORAL at 22:35

## 2021-08-12 RX ADMIN — FAMOTIDINE 20 MG: 20 TABLET ORAL at 07:45

## 2021-08-12 RX ADMIN — CLONAZEPAM 0.5 MG: 0.5 TABLET ORAL at 22:35

## 2021-08-12 RX ADMIN — INSULIN ASPART 1 UNITS: 100 INJECTION, SOLUTION INTRAVENOUS; SUBCUTANEOUS at 06:32

## 2021-08-12 RX ADMIN — PALIPERIDONE 9 MG: 9 TABLET, EXTENDED RELEASE ORAL at 22:35

## 2021-08-12 RX ADMIN — AMANTADINE HYDROCHLORIDE 200 MG: 100 CAPSULE ORAL at 20:54

## 2021-08-12 RX ADMIN — INSULIN ASPART 3 UNITS: 100 INJECTION, SOLUTION INTRAVENOUS; SUBCUTANEOUS at 10:40

## 2021-08-12 RX ADMIN — TOPIRAMATE 200 MG: 100 TABLET, FILM COATED ORAL at 20:54

## 2021-08-12 RX ADMIN — INSULIN ASPART 4 UNITS: 100 INJECTION, SOLUTION INTRAVENOUS; SUBCUTANEOUS at 22:34

## 2021-08-12 RX ADMIN — ESCITALOPRAM OXALATE 10 MG: 10 TABLET ORAL at 07:45

## 2021-08-12 NOTE — PROGRESS NOTES
"  VS: BP (!) 151/59 (BP Location: Right arm)   Pulse 68   Temp 97.6  F (36.4  C) (Oral)   Resp 16   LMP 01/06/2015 (Exact Date)   SpO2 96%   Breastfeeding No    O2: Room air saturations 96%   Output: Pt is able to walk back and forth to bathroom with SBA and verbal coaching. Pt seems to need a lot of verbal coaching with hygiene and morning cares.    Last BM: Pt this am had x 2 loose stools and needed help with madelaine care and hygiene cares.    Activity: Up with assist of 1.    Skin: Intact   Pain: Pt complains of generally joint discomfort.   CMS: Intact   Dressing: NA   Diet: Regular. Seems to need a lot of coaching with carb counting and food choices and reminding.    LDA: IV SL   Equipment: Monitor blood sugar   Plan: Continue to monitor patients status   Additional Info: Pt states\" I live with my sister in her apartment, but if you need to talk to someone about me call my son.\" Pt falls asleep often between cares. Pt has had only tylenol for joint pain. Will continue to monitor patients status.        "

## 2021-08-12 NOTE — PLAN OF CARE
Patient A/Ox4. VSS ex BP elevated - PRN hydralazine given by previous RN. Denies CP, SOB, dizziness/LH. LSCTA. +fl/BS. Voiding well in bathroom, also has incontinence brief. CMS intact. Tolerating regular diet without NV, has carb coverage. Activity level is fair, pt sleeping most of shift, has been up x1 to bathroom. Pain rated as comfortably managed throughout shift, no PRNs requested. Possible discharge today vs tomorrow. Patient has demonstrated ability to call appropriately. Patient is resting with call light within reach. Will continue to monitor.

## 2021-08-13 VITALS
DIASTOLIC BLOOD PRESSURE: 52 MMHG | OXYGEN SATURATION: 95 % | RESPIRATION RATE: 16 BRPM | SYSTOLIC BLOOD PRESSURE: 142 MMHG | HEART RATE: 74 BPM | TEMPERATURE: 97.6 F

## 2021-08-13 LAB
GLUCOSE BLDC GLUCOMTR-MCNC: 124 MG/DL (ref 70–99)
GLUCOSE BLDC GLUCOMTR-MCNC: 143 MG/DL (ref 70–99)
GLUCOSE BLDC GLUCOMTR-MCNC: 159 MG/DL (ref 70–99)
GLUCOSE BLDC GLUCOMTR-MCNC: 241 MG/DL (ref 70–99)

## 2021-08-13 PROCEDURE — 250N000013 HC RX MED GY IP 250 OP 250 PS 637: Performed by: INTERNAL MEDICINE

## 2021-08-13 PROCEDURE — 99207 PR CDG-CODE CATEGORY CHANGED: CPT | Performed by: HOSPITALIST

## 2021-08-13 PROCEDURE — 99217 PR OBSERVATION CARE DISCHARGE: CPT | Performed by: HOSPITALIST

## 2021-08-13 PROCEDURE — 96372 THER/PROPH/DIAG INJ SC/IM: CPT

## 2021-08-13 PROCEDURE — G0378 HOSPITAL OBSERVATION PER HR: HCPCS

## 2021-08-13 RX ORDER — INSULIN ASPART 100 [IU]/ML
7 INJECTION, SOLUTION INTRAVENOUS; SUBCUTANEOUS
Qty: 12 ML | Refills: 1 | Status: SHIPPED | OUTPATIENT
Start: 2021-08-13 | End: 2021-08-24

## 2021-08-13 RX ADMIN — METOPROLOL SUCCINATE 50 MG: 50 TABLET, EXTENDED RELEASE ORAL at 08:00

## 2021-08-13 RX ADMIN — SERTRALINE HYDROCHLORIDE 200 MG: 100 TABLET ORAL at 08:00

## 2021-08-13 RX ADMIN — AMANTADINE HYDROCHLORIDE 100 MG: 100 CAPSULE ORAL at 08:01

## 2021-08-13 RX ADMIN — FAMOTIDINE 20 MG: 20 TABLET ORAL at 08:01

## 2021-08-13 RX ADMIN — ASPIRIN 81 MG: 81 TABLET, DELAYED RELEASE ORAL at 08:00

## 2021-08-13 RX ADMIN — ESCITALOPRAM OXALATE 10 MG: 10 TABLET ORAL at 08:00

## 2021-08-13 RX ADMIN — INSULIN ASPART 1 UNITS: 100 INJECTION, SOLUTION INTRAVENOUS; SUBCUTANEOUS at 06:21

## 2021-08-13 RX ADMIN — ATORVASTATIN CALCIUM 80 MG: 40 TABLET, FILM COATED ORAL at 08:00

## 2021-08-13 RX ADMIN — INSULIN GLARGINE 35 UNITS: 100 INJECTION, SOLUTION SUBCUTANEOUS at 08:01

## 2021-08-13 RX ADMIN — TOPIRAMATE 200 MG: 100 TABLET, FILM COATED ORAL at 08:00

## 2021-08-13 RX ADMIN — INSULIN ASPART 1 UNITS: 100 INJECTION, SOLUTION INTRAVENOUS; SUBCUTANEOUS at 14:02

## 2021-08-13 RX ADMIN — PANTOPRAZOLE SODIUM 40 MG: 40 TABLET, DELAYED RELEASE ORAL at 08:00

## 2021-08-13 RX ADMIN — INSULIN ASPART 3 UNITS: 100 INJECTION, SOLUTION INTRAVENOUS; SUBCUTANEOUS at 02:17

## 2021-08-13 NOTE — DISCHARGE SUMMARY
[facility]  Discharge Summary    Nena Tang MRN# [id number]   YOB: 1960 Age: 61 year old     Date of Admission:  8/10/2021  Date of Discharge:  8/13/2021  Admitting Physician:  Lia Moreno MD  Discharge Physician:  Alivia Le MD  Discharging Service:  Internal Medicine     Primary Provider: Rowena Haas              Discharge Diagnosis:     Primary Discharge Diagnosis:       #DM Type 2, Uncontrolled, A1C at 10.0  #Insulin regimen Non compliance  #Acute on chronic hyperglycemia  #Fatigue and dizziness secondary to above  #KATY:  #CAD  #Dyslipidemia   #HTN  #Schizoaffective disorder  #Depression  #Fibromyalgia:  #Pseudohyponatremia @ 129 on Admit => Resolved.   # Diarrhea: Work up with Enteric panel and c diff negative.        Past Medical History:   Diagnosis Date     Acute respiratory failure with hypoxia (H) 9/4/2017     CAD (coronary artery disease)     5/2014 cath, nonbostructive stenosis to LAD, RCA.     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/2011     Noncompliance with medication regimen 6/8/2011     Obesity      CINDY (obstructive sleep apnea) 3/8/2012    uses CPAP     Osteopenia 10/7/2009     Pneumonia of right lower lobe due to infectious organism 9/4/2017     Schizoaffective disorder, depressive type (H) 2/25/2013     Sepsis (H) 8/29/2017     Suicidal intent 10/2/2013     Takotsubo cardiomyopathy      Type 2 diabetes mellitus (H) 8/30/2011     Uterine cancer (H) 1983     Verbal auditory hallucination 10/4/2012                    Discharge Medications:     Current Discharge Medication List      CONTINUE these  medications which have CHANGED    Details   insulin aspart (NOVOLOG FLEXPEN) 100 UNIT/ML pen Inject 7 Units Subcutaneous 3 times daily (with meals)  Qty: 12 mL, Refills: 1    Associated Diagnoses: Type 2 diabetes mellitus with mild nonproliferative retinopathy without macular edema, with long-term current use of insulin, unspecified laterality (H)         CONTINUE these medications which have NOT CHANGED    Details   acetaminophen (TYLENOL) 650 MG CR tablet Take 1 tablet (650 mg) by mouth every 8 hours as needed for mild pain or fever  Qty: 120 tablet, Refills: 1    Associated Diagnoses: Chronic right-sided low back pain without sciatica      amantadine (SYMMETREL) 100 MG capsule Take 1 capsule (100 mg)  in the morning and 2 capsules (200 mg) in the evening.  Qty: 60 capsule, Refills: 3    Associated Diagnoses: Schizoaffective disorder, depressive type (H)      aspirin (ASA) 81 MG EC tablet TAKE 1 TABLET (81MG) BY MOUTH DAILY  Qty: 90 tablet, Refills: 1    Associated Diagnoses: Coronary artery disease involving native heart with angina pectoris, unspecified vessel or lesion type (H)      atorvastatin (LIPITOR) 80 MG tablet TAKE 1 TABLET (80MG) BY MOUTH DAILY  Qty: 30 tablet, Refills: 11    Associated Diagnoses: Hyperlipidemia LDL goal <100      benzonatate (TESSALON) 100 MG capsule Take 1 capsule (100 mg) by mouth 3 times daily as needed for cough  Qty: 90 capsule, Refills: 1    Associated Diagnoses: Cough      clonazePAM (KLONOPIN) 0.5 MG tablet Take 1 tablet (0.5 mg) by mouth At Bedtime  Qty: 30 tablet, Refills: 0    Associated Diagnoses: Schizoaffective disorder, bipolar type (H)      COMPOUNDED NON-CONTROLLED SUBSTANCE (CMPD RX) - PHARMACY TO MIX COMPOUNDED MEDICATION Please compound viscous lidocaine 2% and maalox in a 1:1 ratio Please take 15 to 30 ml by mouth every 4-6 hours as needed for abdominal pain  Qty: 500 mL, Refills: 0      escitalopram (LEXAPRO) 10 MG tablet Take 1 tablet (10 mg) by mouth  daily  Qty: 30 tablet, Refills: 3    Associated Diagnoses: Other schizophrenia (H)      gabapentin (NEURONTIN) 300 MG capsule Take 1 capsule (300 mg) by mouth At Bedtime  Qty: 30 capsule, Refills: 3    Associated Diagnoses: Chronic right-sided low back pain without sciatica; Schizoaffective disorder, depressive type (H)      hydrOXYzine (VISTARIL) 25 MG capsule Take 1 capsule (25 mg) by mouth every 4 hours as needed for anxiety  Qty: 60 capsule, Refills: 3    Associated Diagnoses: Psychophysiological insomnia      insulin glargine (LANTUS PEN) 100 UNIT/ML pen Inject 35 Units Subcutaneous 2 times daily  Qty: 12 mL, Refills: 1    Comments: If Lantus is not covered by insurance, may substitute Basaglar at same dose and frequency.    Associated Diagnoses: Type 2 diabetes mellitus with mild nonproliferative retinopathy without macular edema, with long-term current use of insulin, unspecified laterality (H)      losartan (COZAAR) 100 MG tablet TAKE 1 TABLET (100MG) BY MOUTH DAILY  Qty: 30 tablet, Refills: 5    Comments: Maximum Refills Reached  Associated Diagnoses: Coronary artery disease involving native heart with angina pectoris, unspecified vessel or lesion type (H)      metoprolol succinate ER (TOPROL-XL) 25 MG 24 hr tablet Take 2 tablets (50 mg) by mouth daily  Qty: 180 tablet, Refills: 3    Associated Diagnoses: HTN, goal below 140/90      nystatin (MYCOSTATIN) 284711 UNIT/GM external powder Apply topically 2 times daily as needed  Qty: 45 g, Refills: 1    Associated Diagnoses: Rash and nonspecific skin eruption      ondansetron (ZOFRAN) 4 MG tablet Take 1 tablet (4 mg) by mouth every 8 hours as needed for nausea  Qty: 8 tablet, Refills: 0    Associated Diagnoses: Nausea      paliperidone ER (INVEGA) 9 MG 24 hr tablet Take 1 tablet (9 mg) by mouth At Bedtime  Qty: 30 tablet, Refills: 2    Associated Diagnoses: Schizoaffective disorder, depressive type (H)      pantoprazole (PROTONIX) 40 MG EC tablet Take 1 tablet  (40 mg) by mouth daily  Qty: 30 tablet, Refills: 3    Associated Diagnoses: Gastroesophageal reflux disease without esophagitis      senna-docusate (SENOKOT-S/PERICOLACE) 8.6-50 MG tablet Take 1 tablet by mouth 2 times daily as needed for constipation  Qty: 60 tablet, Refills: 10    Associated Diagnoses: Constipation, unspecified constipation type      sertraline (ZOLOFT) 100 MG tablet Take 200 mg by mouth daily       topiramate (TOPAMAX) 200 MG tablet Take 1 tablet (200 mg) by mouth 2 times daily . Titrate to this dose as instructed in clinic. Note change in pill size.  Qty: 60 tablet, Refills: 6    Associated Diagnoses: Fibromyalgia; Chronic pain syndrome      traZODone (DESYREL) 100 MG tablet Take 1 tablet (100 mg) by mouth nightly as needed for sleep  Qty: 30 tablet, Refills: 3    Associated Diagnoses: Schizoaffective disorder, depressive type (H)      TRULICITY 1.5 MG/0.5ML pen Inject 1.5 mg Subcutaneous once a week Every Friday  Qty: 4 mL, Refills: 3    Associated Diagnoses: Type 2 diabetes mellitus with hyperglycemia, with long-term current use of insulin (H)      alcohol swab prep pads Use to swab area of injection/rodolfo as directed.  Qty: 100 each, Refills: 3    Associated Diagnoses: Type 2 diabetes mellitus with mild nonproliferative retinopathy without macular edema, with long-term current use of insulin, unspecified laterality (H)      blood glucose (NO BRAND SPECIFIED) test strip Use to test blood sugar 4 times daily or as directed.  Qty: 100 strip, Refills: 11    Associated Diagnoses: Type 2 diabetes mellitus with stage 3 chronic kidney disease, with long-term current use of insulin (H)      Continuous Blood Gluc Sensor (FREESTYLE LEBRON 14 DAY SENSOR) MISC 1 Device every 14 days Change sensor as directed every 14 days  Qty: 2 each, Refills: 11    Associated Diagnoses: Type 2 diabetes mellitus with mild nonproliferative retinopathy without macular edema, with long-term current use of insulin,  unspecified laterality (H)      famotidine (PEPCID) 20 MG tablet Take 1 tablet (20 mg) by mouth daily  Qty: 90 tablet, Refills: 3    Associated Diagnoses: Gastroesophageal reflux disease without esophagitis      fluticasone-salmeterol (ADVAIR) 250-50 MCG/DOSE inhaler Inhale 1 puff into the lungs every 12 hours as needed      insulin pen needle (NOVOFINE 30) 30G X 8 MM miscellaneous USE 4 DAILY OR AS DIRECTED  Qty: 400 each, Refills: 1    Associated Diagnoses: Type 2 diabetes mellitus with mild nonproliferative retinopathy without macular edema, with long-term current use of insulin, unspecified laterality (H)      ipratropium - albuterol 0.5 mg/2.5 mg/3 mL (DUONEB) 0.5-2.5 (3) MG/3ML neb solution Take 1 vial (3 mLs) by nebulization every 6 hours as needed for shortness of breath / dyspnea or wheezing  Qty: 30 vial, Refills: 0    Associated Diagnoses: Wheezing      !! order for DME Equipment being ordered: Depends.  Qty: 30 each, Refills: 4    Associated Diagnoses: Mixed incontinence      !! order for DME Equipment being ordered: CPAP Supplies.  Qty: 1 each, Refills: 0    Associated Diagnoses: CINDY (obstructive sleep apnea)      thin (NO BRAND SPECIFIED) lancets Use with lanceting device. To accompany: Blood Glucose Monitor Brands: per insurance.  Qty: 100 each, Refills: 6    Associated Diagnoses: Type 2 diabetes mellitus with mild nonproliferative retinopathy without macular edema, with long-term current use of insulin, unspecified laterality (H)      zinc oxide (DESITIN) 40 % external ointment Apply topically as needed for dry skin or irritation  Qty: 56 g, Refills: 0    Associated Diagnoses: Cellulitis, gluteal       !! - Potential duplicate medications found. Please discuss with provider.               Hospital Course:     Nena Tang is a 61 year old female with a past medical history significant for type 2 diabetes, coronary artery disease, hypertension, and schizoaffective disorder who presents to the  Emergency Department for evaluation of dizziness.      She was found to be hyperglycemic and fatigued. She was admitted under observation for re- adjusting insulin regimen, diabetic teaching and CC consultation. Recenlty she reports having steroid injection in the hip. She was told her BG will run high. Insulin dose not adjusted. Also just prior to admission for couple to three days, somehow the patient was not taking her insulin. This lead to hyperglycemia. She was feeling sick. BG on admission was > 600. NO DKA noted. She was dehydrated. NO infection noted.      #DM Type 2, Uncontrolled, A1C at 10.0  #Insulin regimen Non compliance  #Acute on chronic hyperglycemia  #Fatigue and dizziness secondary to above     Hyperglycemia likley secondary to medication noncompliance and recent steroid injection in her hip (Per patient report).  No other obvious explanation. No infection.   No evidence of DKA.   No new meds. No steroids.   Patient's home regimen included 35 units of Lantus BID and fixed meal time insulin ( used to be 7 units TID) and well as trulicity every week.   Patient expresses confusion and frustration with her insulin regimen, especially in the setting of CNS relating medicants as well  Unknown compliance. It does not appear that she may be giving herself long acting insulin on a regular basis   She was aggressively hydrated with IVF and started back on her home regimen. Her BG have nicely come down.   Diabetic educator consult  As well a CC consult for home care/ home nurse to monitor medication.   Hx of Lactic Acidosis with metformin, so has been off that.   On the home regimen her BG have nicely come down. She just needs to stay on this.  No changes are needed in the regimen.   She is thinking clear. Expressed understanding.   She is being discharged home.   Follow-up with PCP in one weeks time.      #KATY:     Creatinine at 1.31. Urine analysis with no significant RBC's or casts  Secondary to poor fluid  intake in the setting  hyperglycemia  Resolved with IVF  Creat down to 0.86  Hold Losartan => resumed at discharge.      #CAD  #Dyslipidemia   #HTN     Stable  Resume Metoprolol 100 mg daily  Resume Aspirin 81 mg and atorvastatin 80 mg     #Schizoaffective disorder  #Depression     Resume Prozac 10 mg daily  Invega 9 mg at bedtime  Amantadine 00 mg am and 200 mg PM     #Fibromyalgia:  Resume Topiramate 200 mg BID  Gabapentin 300 mg at bed time      #Pseudohyponatremia @ 129 on Admit => Resolved.   Pseudohyponatremia due to high glucose      # Diarrhea: She had couple days of loose watery non bloody stools. Now resolved. Enteric panel and c diff were negative. Diarrhea now much imrpvoed. Had only one BM today.   Now being discharged home.      Diet:   Regular adult   DVT Prophylaxis: Low Risk/Ambulatory with no VTE prophylaxis indicated ( expected to discharge in 24-48 hrs)   Estes Catheter: Not present  Central Lines: None  Code Status:  Full          Discharge Disposition:   Discharged to home           Condition on Discharge:   Discharge condition: Stable   Code status on discharge: Full Code           Procedures:   none          Consultations:   Consultation during this admission received from none.               Final Day of Progress before Discharge:       Physical Exam:  Blood pressure (!) 142/52, pulse 74, temperature 97.6  F (36.4  C), temperature source Oral, resp. rate 16, last menstrual period 01/06/2015, SpO2 95 %, not currently breastfeeding.  GENERAL: Alert and oriented x 3. NAD.   HEENT: Anicteric sclera. PERRL. Mucous membranes moist and without lesions.   CV: RRR. S1, S2. No murmurs appreciated.   RESPIRATORY: Effort normal. Lungs CTAB with no wheezing, rales, rhonchi.   GI: Abdomen soft and non distended with normoactive bowel sounds present in all quadrants. No tenderness, rebound, guarding.      Data:  All laboratory data reviewed             Significant Results:     Results for orders placed or  performed during the hospital encounter of 08/10/21   Glucose by meter     Status: Abnormal   Result Value Ref Range    GLUCOSE BY METER POCT >600 (HH) 70 - 99 mg/dL   CBC with platelets differential     Status: Abnormal    Narrative    The following orders were created for panel order CBC with platelets differential.  Procedure                               Abnormality         Status                     ---------                               -----------         ------                     CBC with platelets and d...[[id number]]  Abnormal            Final result                 Please view results for these tests on the individual orders.   Ketone Beta-Hydroxybutyrate Quantitative     Status: Normal   Result Value Ref Range    Ketone (Beta-Hydroxybutyrate) Quantitative 0.0 0.0 - 0.6 mmol/L   UA with Microscopic reflex to Culture     Status: Abnormal    Specimen: Urine, Clean Catch   Result Value Ref Range    Color Urine Light Yellow Colorless, Straw, Light Yellow, Yellow    Appearance Urine Slightly Cloudy (A) Clear    Glucose Urine >=1000 (A) Negative mg/dL    Bilirubin Urine Negative Negative    Ketones Urine Negative Negative mg/dL    Specific Gravity Urine 1.018 1.003 - 1.035    Blood Urine Trace (A) Negative    pH Urine 6.0 5.0 - 7.0    Protein Albumin Urine Negative Negative mg/dL    Urobilinogen Urine Normal Normal, 2.0 mg/dL    Nitrite Urine Negative Negative    Leukocyte Esterase Urine Small (A) Negative    Bacteria Urine Few (A) None Seen /HPF    Mucus Urine Present (A) None Seen /LPF    RBC Urine 1 <=2 /HPF    WBC Urine 5 <=5 /HPF    Squamous Epithelials Urine 2 (H) <=1 /HPF    Narrative    Urine Culture not indicated   Comprehensive metabolic panel     Status: Abnormal   Result Value Ref Range    Sodium 129 (L) 133 - 144 mmol/L    Potassium 4.0 3.4 - 5.3 mmol/L    Chloride 100 94 - 109 mmol/L    Carbon Dioxide (CO2) 20 20 - 32 mmol/L    Anion Gap 9 3 - 14 mmol/L    Urea Nitrogen 29 7 - 30 mg/dL     Creatinine 1.31 (H) 0.52 - 1.04 mg/dL    Calcium 8.9 8.5 - 10.1 mg/dL    Glucose 641 (HH) 70 - 99 mg/dL    Alkaline Phosphatase 128 40 - 150 U/L    AST 9 0 - 45 U/L    ALT 25 0 - 50 U/L    Protein Total 7.4 6.8 - 8.8 g/dL    Albumin 3.4 3.4 - 5.0 g/dL    Bilirubin Total 0.2 0.2 - 1.3 mg/dL    GFR Estimate 44 (L) >60 mL/min/1.73m2   CBC with platelets and differential     Status: Abnormal   Result Value Ref Range    WBC Count 5.7 4.0 - 11.0 10e3/uL    RBC Count 3.50 (L) 3.80 - 5.20 10e6/uL    Hemoglobin 10.9 (L) 11.7 - 15.7 g/dL    Hematocrit 34.7 (L) 35.0 - 47.0 %    MCV 99 78 - 100 fL    MCH 31.1 26.5 - 33.0 pg    MCHC 31.4 (L) 31.5 - 36.5 g/dL    RDW 12.4 10.0 - 15.0 %    Platelet Count 193 150 - 450 10e3/uL    % Neutrophils 67 %    % Lymphocytes 25 %    % Monocytes 6 %    % Eosinophils 1 %    % Basophils 0 %    % Immature Granulocytes 1 %    NRBCs per 100 WBC 0 <1 /100    Absolute Neutrophils 3.9 1.6 - 8.3 10e3/uL    Absolute Lymphocytes 1.4 0.8 - 5.3 10e3/uL    Absolute Monocytes 0.4 0.0 - 1.3 10e3/uL    Absolute Eosinophils 0.0 0.0 - 0.7 10e3/uL    Absolute Basophils 0.0 0.0 - 0.2 10e3/uL    Absolute Immature Granulocytes 0.1 (H) <=0.0 10e3/uL    Absolute NRBCs 0.0 10e3/uL   SARS-COV2 (COVID-19) Virus RT-PCR     Status: Normal    Specimen: Nasopharyngeal; Swab   Result Value Ref Range    SARS CoV2 PCR Negative Negative    Narrative    Testing was performed using the [employer]  SARS-CoV-2 & Influenza A/B Assay on the [employer]  Ver[employer]  System.  This test should be ordered for the detection of SARS-COV-2 in individuals who meet SARS-CoV-2 clinical and/or epidemiological criteria. Test performance is unknown in asymptomatic patients.  This test is for in vitro diagnostic use under the FDA EUA for laboratories certified under CLIA to perform moderate and/or high complexity testing. This test has not been FDA cleared or approved.  A negative test does not rule out the presence of PCR inhibitors in the specimen or target RNA  in concentration below the limit of detection for the assay. The possibility of a false negative should be considered if the patient's recent exposure or clinical presentation suggests COVID-19.  [facility] [employer] are certified under the Clinical Laboratory Improvement Amendments of 1988 (CLIA-88) as qualified to perform moderate and/or high complexity laboratory testing.   Hemoglobin A1c     Status: Abnormal   Result Value Ref Range    Hemoglobin A1C 10.0 (H) 0.0 - 5.6 %   Glucose by meter     Status: Abnormal   Result Value Ref Range    GLUCOSE BY METER POCT 489 (H) 70 - 99 mg/dL   Glucose by meter     Status: Abnormal   Result Value Ref Range    GLUCOSE BY METER POCT 232 (H) 70 - 99 mg/dL   SARS-COV2 (COVID-19) Virus RT-PCR     Status: Normal    Specimen: Nasopharyngeal; Swab   Result Value Ref Range    SARS CoV2 PCR Negative Negative    Narrative    Testing was performed using the [employer]  SARS-CoV-2 & Influenza A/B Assay on the [employer]  Ver[employer]  System.  This test should be ordered for the detection of SARS-COV-2 in individuals who meet SARS-CoV-2 clinical and/or epidemiological criteria. Test performance is unknown in asymptomatic patients.  This test is for in vitro diagnostic use under the FDA EUA for laboratories certified under CLIA to perform moderate and/or high complexity testing. This test has not been FDA cleared or approved.  A negative test does not rule out the presence of PCR inhibitors in the specimen or target RNA in concentration below the limit of detection for the assay. The possibility of a false negative should be considered if the patient's recent exposure or clinical presentation suggests COVID-19.  [facility] [employer] are certified under the Clinical Laboratory Improvement Amendments of 1988 (CLIA-88) as qualified to perform moderate and/or high complexity laboratory testing.   Glucose by meter     Status: Abnormal   Result Value Ref Range    GLUCOSE BY METER POCT 162  (H) 70 - 99 mg/dL   Comprehensive metabolic panel     Status: Abnormal   Result Value Ref Range    Sodium 140 133 - 144 mmol/L    Potassium 3.6 3.4 - 5.3 mmol/L    Chloride 114 (H) 94 - 109 mmol/L    Carbon Dioxide (CO2) 20 20 - 32 mmol/L    Anion Gap 6 3 - 14 mmol/L    Urea Nitrogen 15 7 - 30 mg/dL    Creatinine 0.86 0.52 - 1.04 mg/dL    Calcium 8.6 8.5 - 10.1 mg/dL    Glucose 155 (H) 70 - 99 mg/dL    Alkaline Phosphatase 104 40 - 150 U/L    AST 13 0 - 45 U/L    ALT 23 0 - 50 U/L    Protein Total 6.6 (L) 6.8 - 8.8 g/dL    Albumin 3.0 (L) 3.4 - 5.0 g/dL    Bilirubin Total 0.2 0.2 - 1.3 mg/dL    GFR Estimate 74 >60 mL/min/1.73m2   CBC with platelets     Status: Abnormal   Result Value Ref Range    WBC Count 6.5 4.0 - 11.0 10e3/uL    RBC Count 3.24 (L) 3.80 - 5.20 10e6/uL    Hemoglobin 10.2 (L) 11.7 - 15.7 g/dL    Hematocrit 31.5 (L) 35.0 - 47.0 %    MCV 97 78 - 100 fL    MCH 31.5 26.5 - 33.0 pg    MCHC 32.4 31.5 - 36.5 g/dL    RDW 12.4 10.0 - 15.0 %    Platelet Count 183 150 - 450 10e3/uL   Glucose by meter     Status: Abnormal   Result Value Ref Range    GLUCOSE BY METER POCT 232 (H) 70 - 99 mg/dL   Glucose by meter     Status: Abnormal   Result Value Ref Range    GLUCOSE BY METER POCT 169 (H) 70 - 99 mg/dL   Glucose by meter     Status: Abnormal   Result Value Ref Range    GLUCOSE BY METER POCT 156 (H) 70 - 99 mg/dL   Glucose by meter     Status: Abnormal   Result Value Ref Range    GLUCOSE BY METER POCT 133 (H) 70 - 99 mg/dL   Glucose by meter     Status: Abnormal   Result Value Ref Range    GLUCOSE BY METER POCT 214 (H) 70 - 99 mg/dL   Glucose by meter     Status: Abnormal   Result Value Ref Range    GLUCOSE BY METER POCT 247 (H) 70 - 99 mg/dL   Glucose by meter     Status: Abnormal   Result Value Ref Range    GLUCOSE BY METER POCT 231 (H) 70 - 99 mg/dL   Glucose by meter     Status: Abnormal   Result Value Ref Range    GLUCOSE BY METER POCT 233 (H) 70 - 99 mg/dL   Glucose by meter     Status: Abnormal   Result  Value Ref Range    GLUCOSE BY METER POCT 250 (H) 70 - 99 mg/dL   Glucose by meter     Status: Abnormal   Result Value Ref Range    GLUCOSE BY METER POCT 197 (H) 70 - 99 mg/dL   Glucose by meter     Status: Abnormal   Result Value Ref Range    GLUCOSE BY METER POCT 176 (H) 70 - 99 mg/dL   Glucose by meter     Status: Abnormal   Result Value Ref Range    GLUCOSE BY METER POCT 251 (H) 70 - 99 mg/dL   Glucose by meter     Status: Abnormal   Result Value Ref Range    GLUCOSE BY METER POCT 201 (H) 70 - 99 mg/dL   Glucose by meter     Status: Abnormal   Result Value Ref Range    GLUCOSE BY METER POCT 179 (H) 70 - 99 mg/dL   Glucose by meter     Status: Abnormal   Result Value Ref Range    GLUCOSE BY METER POCT 317 (H) 70 - 99 mg/dL   Glucose by meter     Status: Abnormal   Result Value Ref Range    GLUCOSE BY METER POCT 241 (H) 70 - 99 mg/dL   Glucose by meter     Status: Abnormal   Result Value Ref Range    GLUCOSE BY METER POCT 159 (H) 70 - 99 mg/dL   Glucose by meter     Status: Abnormal   Result Value Ref Range    GLUCOSE BY METER POCT 124 (H) 70 - 99 mg/dL   Glucose by meter     Status: Abnormal   Result Value Ref Range    GLUCOSE BY METER POCT 143 (H) 70 - 99 mg/dL   EKG 12-lead, tracing only     Status: None   Result Value Ref Range    Systolic Blood Pressure  mmHg    Diastolic Blood Pressure  mmHg    Ventricular Rate 99 BPM    Atrial Rate 99 BPM    DE Interval 170 ms    QRS Duration 92 ms     ms    QTc 449 ms    P Axis 43 degrees    R AXIS -55 degrees    T Axis 14 degrees    Interpretation ECG       Sinus rhythm  Left axis deviation  Possible Anterior infarct , age undetermined  Abnormal ECG  Unconfirmed report - interpretation of this ECG is computer generated - see medical record for final interpretation  Confirmed by - EMERGENCY ROOM, PHYSICIAN ([id number]),  BRET MUIR ([id number]) on 8/11/2021 6:46:40 AM     Care Management / Social Work IP Consult     Status: None ()    Tanvi Paulino RN      8/11/2021  4:25 PM  Care Management Initial Consult    General Information  Assessment completed with:  ,    Type of CM/SW Visit: Offer D/C Planning    Primary Care Provider verified and updated as needed: Yes   Readmission within the last 30 days:           Advance Care Planning:            Communication Assessment  Patient's communication style: spoken language (English or   Bilingual)             Cognitive  Cognitive/Neuro/Behavioral: .WDL except  Level of Consciousness:   alert  Arousal Level: opens eyes spontaneously  Orientation:   oriented x 4  Mood/Behavior: calm, cooperative  Best Language: 0   - No aphasia  Speech: slow, slurred    Living Environment:   People in home: sibling(s)  April  Current living Arrangements: house      Able to return to prior arrangements: yes       Family/Social Support:  Care provided by: self, other (see comments)  Provides care for:    Marital Status: Single  Sibling(s)          Description of Support System: Supportive, Involved    Support Assessment: Adequate family and caregiver support    Current Resources:   Patient receiving home care services:       Community Resources:    Equipment currently used at home:    Supplies currently used at home:      Employment/Financial:  Employment Status:          Financial Concerns:             Lifestyle & Psychosocial Needs:  Social Determinants of Health     Tobacco Use: Low Risk      Smoking Tobacco Use: Never Smoker     Smokeless Tobacco Use: Never Used   Alcohol Use:      Frequency of Alcohol Consumption:      Average Number of Drinks:      Frequency of Binge Drinking:    Financial Resource Strain:      Difficulty of Paying Living Expenses:    Food Insecurity:      Worried About Running Out of Food in the Last Year:      Ran Out of Food in the Last Year:    Transportation Needs:      Lack of Transportation (Medical):      Lack of Transportation (Non-Medical):    Physical Activity:      Days of Exercise per Week:      Minutes of  Exercise per Session:    Stress:      Feeling of Stress :    Social Connections:      Frequency of Communication with Friends and Family:      Frequency of Social Gatherings with Friends and Family:      Attends Taoist Services:      Active Member of Clubs or Organizations:      Attends Club or Organization Meetings:      Marital Status:    Intimate Partner Violence:      Fear of Current or Ex-Partner:      Emotionally Abused:      Physically Abused:      Sexually Abused:    Depression: Not at risk     PHQ-2 Score: 2   Housing Stability:      Unable to Pay for Housing in the Last Year:      Number of Places Lived in the Last Year:      Unstable Housing in the Last Year:        Functional Status:  Prior to admission patient needed assistance:       Lives with sister who gives 24/7 care.       Mental Health Status:        Schizophrenia    Chemical Dependency Status:                Values/Beliefs:  Spiritual, Cultural Beliefs, Taoist Practices, Values that   affect care:                 Additional Information:  Plan for patient to discharge to home with family member who   provides 24/7 cares. No home care or outpatient therapies   recommended at this time. RNCC available as needed.    Tanvi Salinas RN, BSN  Care Coordinator,  [facility]  Phone [phone]  Pager [phone]           Urine Culture     Status: None    Specimen: Urine, Midstream   Result Value Ref Range    Culture 50,000-100,000 CFU/mL Mixture of urogenital timoteo    Asymptomatic COVID-19 Virus (Coronavirus) by PCR Nasopharyngeal     Status: Normal    Specimen: Nasopharyngeal; Swab    Narrative    The following orders were created for panel order Asymptomatic COVID-19 Virus (Coronavirus) by PCR Nasopharyngeal.  Procedure                               Abnormality         Status                     ---------                               -----------         ------                     SARS-COV2 (COVID-19) Vir...[[id number]]  Normal               Final result                 Please view results for these tests on the individual orders.   Asymptomatic COVID-19 Virus (Coronavirus) by PCR Nasopharyngeal     Status: Normal    Specimen: Nasopharyngeal; Swab    Narrative    The following orders were created for panel order Asymptomatic COVID-19 Virus (Coronavirus) by PCR Nasopharyngeal.  Procedure                               Abnormality         Status                     ---------                               -----------         ------                     SARS-COV2 (COVID-19) Vir...[[id number]]  Normal              Final result                 Please view results for these tests on the individual orders.   Enteric Bacteria and Virus Panel by ZACHARY Stool     Status: Normal    Specimen: Per Rectum; Stool   Result Value Ref Range    Campylobacter group Not Detected Not Detected    Salmonella species Not Detected Not Detected    Shigella species Not Detected Not Detected    Vibrio group Not Detected Not Detected    Rotavirus Not Detected Not Detected    Shiga toxin 1 gene Not Detected Not Detected    Shiga toxin 2 gene Not Detected Not Detected    Norovirus I and II Not Detected Not Detected    Yersinia enterocolitica Not Detected Not Detected    Narrative    Testing performed by multiplexed, qualitative PCR using the [employer] Enteric Pathogens Nucleic Acid Test. Results should not be used as the sole basis for diagnosis, treatment or other patient management decisions. Positive results do not rule out co-infection with other organisms that are not detected by this test and may not be the sole or definitive cause of patient illness. Negative results in the setting of clinical illness compatible with gastroenteritis may be due to infection by pathogens that are not detected by this test or non-infectious causes such as ulcerative colitis, irritable bowel syndrome or Crohn's disease. Note: Shiga toxin producing E. coli (STEC) typically harbor one or both genes  that encode for Shiga toxins 1 and 2.   Clostridium difficile toxin B PCR     Status: Normal    Specimen: Per Rectum; Stool   Result Value Ref Range    C Difficile Toxin B by PCR Negative Negative    Narrative    The [employer] Xpert C. difficile Assay, performed on the [employer], is a qualitative in vitro diagnostic test for rapid detection of toxin B gene sequences from unformed (liquid or soft) stool specimens collected from patients suspected of having Clostridioides difficile infection (CDI). The test utilizes automated real-time polymerase chain reaction (PCR) to detect toxin gene sequences associated with toxin producing C. difficile. The Xpert C. difficile Assay is intended as an aid in the diagnosis of CDI.      No results found for this or any previous visit (from the past 48 hour(s)).             Pending Results:   Unresulted Labs Ordered in the Past 30 Days of this Admission     No orders found from 7/11/2021 to 8/11/2021.                  Discharge Instructions and Follow-Up:     Discharge Procedure Orders   Reason for your hospital stay   Order Comments: Uncontrolled Hyperglycemia, Diarrhea. Now resolved.     Activity   Order Comments: Your activity upon discharge: activity as tolerated     Order Specific Question Answer Comments   Is discharge order? Yes      Adult [facility] Follow-up and recommended labs and tests   Order Comments: Follow up with primary care provider, Rowena Haas, within 7 days to evaluate medication change and for hospital follow- up.  No follow up labs or test are needed.      Appointments on [facility] and/or [facility] (with [facility] or [facility] provider or service). Call [phone] if you haven't heard regarding these appointments within 7 days of discharge.     Diet   Order Comments: Follow this diet upon discharge: Orders Placed This Encounter      Regular Diet Adult     Order Specific Question Answer Comments   Is discharge order? Yes              Alivia Le MD  Internal Medicine Staff Hospitalist  [phone]  August 13, 2021        Time spent on patient: 35 minutes total including face to face and coordinating care time reviewing current illness, any medication changes, and the care plan for today.

## 2021-08-13 NOTE — UTILIZATION REVIEW
"  Admission Status; Secondary Review Determination         Under the authority of the Utilization Management Committee, the utilization review process indicated a secondary review on the above patient.  The review outcome is based on review of the medical records, discussions with staff, and applying clinical experience noted on the date of the review.       (x) Observation Status Appropriate - This patient does not meet hospital inpatient criteria and is placed in observation status. If this patient's primary payer is Medicare and was admitted as an inpatient, Condition Code 44 should be used and patient status changed to \"observation\".     RATIONALE FOR DETERMINATION   The patient is a 61-year-old female admitted on 8/10/2021.  She came in with poorly controlled diabetes mellitus type 2 with hemoglobin A1c of 10.0.  Her creatinine was also elevated at 1.31.  She has had diarrhea also.  She is on insulin but frustrated with her regimen she received diabetic education and endocrine was consulted.  The case was discussed with Dr. Rose.  Stool tests were done and C. difficile was ruled out.  Patient likely will be discharged today.  Observation status remains appropriate.      The severity of illness, intensity of service provided, expected LOS and risk for adverse outcome make the care complex, high risk and appropriate for hospital admission.        The information on this document is developed by the utilization review team in order for the business office to ensure compliance.  This only denotes the appropriateness of proper admission status and does not reflect the quality of care rendered.         The definitions of Inpatient Status and Observation Status used in making the determination above are those provided in the CMS Coverage Manual, Chapter 1 and Chapter 6, section 70.4.      Sincerely,     Yoan Eastman MD  Physician Advisor  Utilization Review/ Case Management  [facility].    "

## 2021-08-13 NOTE — PLAN OF CARE
VS: BP (!) 142/52 (BP Location: Right arm)   Pulse 74   Temp 97.6  F (36.4  C) (Oral)   Resp 16   LMP 01/06/2015 (Exact Date)   SpO2 95%   Breastfeeding No    O2: >90% on room air. Denies SOB/N/V/chest pain    Output: Voiding spontaneously without difficulties    Last BM: Per patient she had 2 stools this shift, writer was not able to visualize stool. LBM 8/13/21   Activity: Patient up ad lito    Skin: Scab to forehead    Pain: Denies    CMS: Denies numbness and tingling    Dressing: NA   Diet: Regular diet    LDA: NA   Equipment: Call light within patient reach    Plan: To discharge to home this afternoon    Additional Info:      Pt. discharged at 1650 to Home. Pt. was accompanied by transport staff, and left with personal belongings. Prior to discharge, PIV was removed. Pt. received complete discharge paperwork and medications as filled by discharge pharmacy. Pt. was given times of last dose for all discharge medications in writing on discharge medication sheets.  Discharge teaching included medication, pain management, activity restrictions, dressing changes, and signs and symptoms of infection. Pt. to follow up with Primary in 7 days. Pt. had no further questions at the time of discharge and no unmet needs were identified.

## 2021-08-13 NOTE — PLAN OF CARE
VS: Stable except hypertensive, /67, given hydralazine, BP recheck 128/62.   O2: RA.   Output: Voiding in bathroom, urgency, incontinence, brief on.   Last BM: 8/12.   Activity: SBA.   Skin: Abdominal bruise, scab shin.   Pain: Headache managed with tylenol.   CMS: Intact.   Dressing: NA.   Diet: Regular, carb count,  at 2149,  at 0152, BG    LDA: No IV access.   Equipment: Call light within reach.   Plan: Continue to monitor.   Additional Info: Flat affect, drowsy.      OBS goals:  -diagnostic tests and consults completed and resulted   -vital signs normal or at patient baseline   - Control of blood sugar   -Diabetes education   - Care coordinator consult for home aid assessment     0909-2879: pt in bed  7767-5653: , VSS except hypertensive  0980-4860: pt sleeping  1079-7979: , pt sleeping  4744-4536: BP stable, pt sleeping  6454-8037:  at 0607, pt sleeping

## 2021-08-14 ENCOUNTER — PATIENT OUTREACH (OUTPATIENT)
Dept: CARE COORDINATION | Facility: CLINIC | Age: 61
End: 2021-08-14

## 2021-08-14 DIAGNOSIS — Z71.89 OTHER SPECIFIED COUNSELING: ICD-10-CM

## 2021-08-17 ENCOUNTER — TELEPHONE (OUTPATIENT)
Dept: FAMILY MEDICINE | Facility: CLINIC | Age: 61
End: 2021-08-17

## 2021-08-17 NOTE — TELEPHONE ENCOUNTER
Reason for Call:  Other     Detailed comments: Evangelina everett RN with [facility] Is seeking orders for the following:    Call back for skilled nursing to eval and treat    Line is secure OK to leave VM    Phone Number Patient can be reached at: Other phone [phone]    Best Time: anytime    Can we leave a detailed message on this number? YES    Call taken on 8/17/2021 at 9:22 AM by Tatyana Naylor

## 2021-08-24 ENCOUNTER — OFFICE VISIT (OUTPATIENT)
Dept: BEHAVIORAL HEALTH | Facility: CLINIC | Age: 61
End: 2021-08-24
Payer: COMMERCIAL

## 2021-08-24 ENCOUNTER — OFFICE VISIT (OUTPATIENT)
Dept: PHARMACY | Facility: CLINIC | Age: 61
End: 2021-08-24
Payer: COMMERCIAL

## 2021-08-24 ENCOUNTER — OFFICE VISIT (OUTPATIENT)
Dept: FAMILY MEDICINE | Facility: CLINIC | Age: 61
End: 2021-08-24
Payer: COMMERCIAL

## 2021-08-24 VITALS
OXYGEN SATURATION: 97 % | BODY MASS INDEX: 41.01 KG/M2 | HEART RATE: 82 BPM | TEMPERATURE: 96.9 F | WEIGHT: 210 LBS | DIASTOLIC BLOOD PRESSURE: 68 MMHG | SYSTOLIC BLOOD PRESSURE: 128 MMHG

## 2021-08-24 VITALS
OXYGEN SATURATION: 97 % | BODY MASS INDEX: 41.01 KG/M2 | WEIGHT: 210 LBS | HEART RATE: 82 BPM | DIASTOLIC BLOOD PRESSURE: 86 MMHG | SYSTOLIC BLOOD PRESSURE: 128 MMHG | TEMPERATURE: 96.9 F

## 2021-08-24 DIAGNOSIS — Z09 HOSPITAL DISCHARGE FOLLOW-UP: Primary | ICD-10-CM

## 2021-08-24 DIAGNOSIS — Z79.4 TYPE 2 DIABETES MELLITUS WITH MILD NONPROLIFERATIVE RETINOPATHY WITHOUT MACULAR EDEMA, WITH LONG-TERM CURRENT USE OF INSULIN, UNSPECIFIED LATERALITY (H): ICD-10-CM

## 2021-08-24 DIAGNOSIS — M79.7 FIBROMYALGIA: ICD-10-CM

## 2021-08-24 DIAGNOSIS — R19.7 DIARRHEA, UNSPECIFIED TYPE: ICD-10-CM

## 2021-08-24 DIAGNOSIS — R10.84 ABDOMINAL PAIN, GENERALIZED: ICD-10-CM

## 2021-08-24 DIAGNOSIS — G89.4 CHRONIC PAIN SYNDROME: ICD-10-CM

## 2021-08-24 DIAGNOSIS — F25.1 SCHIZOAFFECTIVE DISORDER, DEPRESSIVE TYPE (H): Primary | ICD-10-CM

## 2021-08-24 DIAGNOSIS — Z79.4 TYPE 2 DIABETES MELLITUS WITH MILD NONPROLIFERATIVE RETINOPATHY WITHOUT MACULAR EDEMA, WITH LONG-TERM CURRENT USE OF INSULIN, UNSPECIFIED LATERALITY (H): Primary | ICD-10-CM

## 2021-08-24 DIAGNOSIS — E11.3299 TYPE 2 DIABETES MELLITUS WITH MILD NONPROLIFERATIVE RETINOPATHY WITHOUT MACULAR EDEMA, WITH LONG-TERM CURRENT USE OF INSULIN, UNSPECIFIED LATERALITY (H): ICD-10-CM

## 2021-08-24 DIAGNOSIS — E11.3299 TYPE 2 DIABETES MELLITUS WITH MILD NONPROLIFERATIVE RETINOPATHY WITHOUT MACULAR EDEMA, WITH LONG-TERM CURRENT USE OF INSULIN, UNSPECIFIED LATERALITY (H): Primary | ICD-10-CM

## 2021-08-24 DIAGNOSIS — F25.1 SCHIZOAFFECTIVE DISORDER, DEPRESSIVE TYPE (H): ICD-10-CM

## 2021-08-24 PROCEDURE — 90832 PSYTX W PT 30 MINUTES: CPT

## 2021-08-24 PROCEDURE — 99495 TRANSJ CARE MGMT MOD F2F 14D: CPT | Performed by: NURSE PRACTITIONER

## 2021-08-24 PROCEDURE — 99607 MTMS BY PHARM ADDL 15 MIN: CPT | Performed by: PHARMACIST

## 2021-08-24 PROCEDURE — 99606 MTMS BY PHARM EST 15 MIN: CPT | Performed by: PHARMACIST

## 2021-08-24 RX ORDER — INSULIN ASPART 100 [IU]/ML
10 INJECTION, SOLUTION INTRAVENOUS; SUBCUTANEOUS
Qty: 12 ML | Refills: 1 | Status: ON HOLD | OUTPATIENT
Start: 2021-08-24 | End: 2021-09-29

## 2021-08-24 NOTE — PATIENT INSTRUCTIONS
- Continue with Novolog at 10 units with meals.   - Gray (Lantus) pen twice per day.   - Bring glucose machine to clinic.   - Bring medication list from Group Home.     Direct phone number for Eugenia De Jesus pharmacist - [phone]

## 2021-08-24 NOTE — PROGRESS NOTES
Medication Therapy Management (MTM) Encounter    ASSESSMENT:                            Medication Adherence/Access: See below for considerations    Stomach pain: improved, see PCP notes for details of plan.     Type 2 Diabetes: A1c not at goal <7%. No changes needed in medication dosing, just adherence, which will certainly improve in group home setting. Education to take lantus as prescribed with or without food.     Schizoaffective: stable. Provided reassurance for decision to move back into a group home setting.  Reviewed with patient that diabetes in particular has progressively worsened the longer she has been living with her sister, as evidence by A1c decline from 6.2>>10.0 over the last 2 years. Continue to involve [facility] for therapy support at visits.    PLAN:                            Take Lantus twice daily as prescribed with or without food    Follow-up: Return in about 4 weeks (around 9/21/2021) for MTM covisit.      SUBJECTIVE/OBJECTIVE:                          Nena Tang is a 61 year old female coming in for a follow-up visit. She was referred to me from Rowena Haas. Today's visit is a co-visit with PCP and Ambika Nieto [facility]. Today's visit is a follow-up MTM visit from 7/27/21.    She had been discharged from   For   #DM Type 2, Uncontrolled, A1C at 10.0  #Insulin regimen Non compliance  #Acute on chronic hyperglycemia  #Fatigue and dizziness secondary to above     Hyperglycemia likley secondary to medication noncompliance and recent steroid injection in her hip (Per patient report).  No other obvious explanation. No infection.   No evidence of DKA.   No new meds. No steroids.   Patient's home regimen included 35 units of Lantus BID and fixed meal time insulin ( used to be 7 units TID) and well as trulicity every week.   Patient expresses confusion and frustration with her insulin regimen, especially in the setting of CNS relating medicants as well  Unknown compliance. It does not appear  that she may be giving herself long acting insulin on a regular basis   She was aggressively hydrated with IVF and started back on her home regimen. Her BG have nicely come down.   Diabetic educator consult  As well a CC consult for home care/ home nurse to monitor medication.   Hx of Lactic Acidosis with metformin, so has been off that.   On the home regimen her BG have nicely come down. She just needs to stay on this.  No changes are needed in the regimen.   She is thinking clear. Expressed understanding.   She is being discharged home.   Follow-up with PCP in one weeks time.     Reason for visit: medication recheck, hospital follow-up.    Allergies/ADRs: Reviewed in chart  Past Medical History: Reviewed in chart  Tobacco: She reports that she has never smoked. She has never used smokeless tobacco.  Alcohol: not currently using    Medication Adherence/Access:   No concerns about pills but continues to miss insulin injections.  [phone] HCRN Blanka  Will be moving into group home 8/31.    Stomach pain: continues to have diarrhea but stomach pain has improved.   She continues to take pantoprazole 40mg daily and famotidine 20mg daily.     Type 2 Diabetes: Pt currently prescribed Lantus 35u daily twice a day, Novolog 10u with twice a day every meal, Trulicity 1.5mg weekly.   She is frequently missing insulin injections. Confirmed with reviewing medication refill history, insulins and Trulicity have not been refilled on time. Thought she needed to eat when she takes both Lantus and Novolog so skips the morning sometimes.  Pt is not experiencing side effects.  Recent steroid hip injection.  SMBG: CGM, did not bring to clinic today. Brought in Gabby sensor and asking for help with placing on her arm.  Reports the following blood sugars:  192 this morning.   260 yesterday after eating.   Hypoglycemia: none   Recent symptoms of high blood sugar? fatigue.   Eye exam: due  Foot exam: up to date  ACEi/ARB: losartan    Urine Albumin:   Lab Results   Component Value Date    MICROL 7 09/15/2020   Aspirin: Taking 81mg daily and denies side effects  Diet/Exercise: eats twice a day. Feels like her appetite has been lower.  Stopped drinking pop, stopped candy, stopped honey in her tea - all these changes since hospitalization.   Lab Results   Component Value Date    A1C 10.0 08/10/2021    A1C 9.5 07/27/2021    A1C 9.1 12/01/2020    A1C 9.7 09/15/2020    A1C 8.6 06/30/2020    A1C 6.2 12/03/2019    A1C 6.6 08/06/2019     Schizoaffective: Currently taking escitalopram 10mg daily, Invega 9mg daily, amantadine 100mg AM and 200mg PM, trazodone 100mg HS, clonazepam 0.5mg HS. She is still not using CPAP.  Mood is reported as somewhat down. Current stressors: moving out of her sister's home into group home. Diabetes out of control.  Her sister doesn't want her to move out but patient advocating for herself and need to move to improve her health.   Continues to go to drop in [place] 3 times per week. Exercise and diet support at [facility] in [place].    Today's Vitals: /86   Pulse 82   Temp 96.9  F (36.1  C) (Temporal)   Wt 210 lb (95.3 kg)   LMP 01/06/2015 (Exact Date)   SpO2 97%   BMI 41.01 kg/m     BP Readings from Last 1 Encounters:   08/24/21 128/86     Pulse Readings from Last 1 Encounters:   08/24/21 82     Wt Readings from Last 1 Encounters:   08/24/21 210 lb (95.3 kg)     Ht Readings from Last 1 Encounters:   06/21/21 5' (1.524 m)     Estimated body mass index is 41.01 kg/m  as calculated from the following:    Height as of 6/21/21: 5' (1.524 m).    Weight as of this encounter: 210 lb (95.3 kg).    Temp Readings from Last 1 Encounters:   08/24/21 96.9  F (36.1  C) (Temporal)      ----------------  Post Discharge Medication Reconciliation Status: discharge medications reconciled, continue medications without change.    I spent 45 minutes with this patient today. All changes were made via collaborative practice agreement with Rowena  ART Rayo CNP. A copy of the visit note was provided to the patient's primary care provider.    The patient was given a summary of these recommendations. See Provider note/AVS from today.     Eugenia De Jesus, PharmD, BCACP      Medication Therapy Recommendations  Type 2 diabetes mellitus without complication, with long-term current use of insulin (H)    Current Medication: insulin glargine (LANTUS PEN) 100 UNIT/ML pen   Rationale: Does not understand instructions - Adherence - Adherence   Recommendation: Provide Education   Status: Patient Agreed - Adherence/Education

## 2021-08-24 NOTE — PROGRESS NOTES
[facility]  August 24, 2021    Behavioral Health Clinician Progress Note    Patient Name: Nena Tang           Service Type:  Individual      Service Location:   Face to Face in Clinic     Session Start Time: 2:00 pm  Session End Time:  2:35 pm      Session Length: 16 - 37      Attendees: Patient, PCP and MTM    Visit Activities (Refresh list every visit): [facility]     Diagnostic Assessment Date: 1/23/18, Updated 12/12/19 - DA due to be updated   Treatment Plan Review Date: 10/29/21   CGI: 7/29/21    See Flowsheets for today's PHQ-9 and TAMIA-7 results  Previous PHQ-9:   PHQ-9 SCORE 10/26/2018 5/7/2019 12/1/2020   PHQ-9 Total Score - - -   PHQ-9 Total Score - - -   PHQ-9 Total Score 20 22 13     Previous TAMIA-7:   TAMIA-7 SCORE 2/3/2017 3/13/2018 10/26/2018   Total Score - - -   Total Score 0 17 19   Total Score - - -       ALEXANDRA LEVEL:  ALEXANDRA Score (Last Two) 8/30/2011 6/9/2014   ALEXANDRA Raw Score 42 37   Activation Score 66 49.9   ALEXANDRA Level 3 2       DATA  Extended Session (60+ minutes): No  Interactive Complexity: No  Crisis: No  [facility] Patient: No    Treatment Objective(s) Addressed in This Session:  Target Behavior(s): disease management/lifestyle changes mental health    Depressed Mood: Increase interest, engagement, and pleasure in doing things  Adjustment Difficulties: will develop coping/problem-solving skills to facilitate more adaptive adjustment    Current Stressors / Issues:  [facility] co-visit with patient, PCP, and MTM. Patient presents primarily for diabetes management check-up. She discussed a recent experience of frightening physical symptoms, which led to an ED visit. Patient and PCP/MTM went over adherence to medication regimen. Aside from physical complaints, patient states she is looking forward to moving into a group home at the end of the month despite some sadness about no longer living with her sister. She has been engaging in a range of joyful activities  organized by the drop-in center she frequently goes to. Patient reports she has been getting regular movement through walking and reports no changes in mood, appetite, sleep, or thought patterns. Denied SI/SIB/safety concerns.    Due to historical safety concerns, the [place]-Suicide Severity Rating Scale was completed on 7/29/21 by patient and [facility] TWILA Quinn. See below for safety plan.     Progress on Treatment Objective(s) / Homework:  Minimal progress - PREPARATION (Decided to change - considering how); Intervened by negotiating a change plan and determining options / strategies for behavior change, identifying triggers, exploring social supports, and working towards setting a date to begin behavior change    Motivational Interviewing    MI Intervention: Expressed Empathy/Understanding, Supported Autonomy, Collaboration, Evocation, Open-ended questions and Reflections: simple and complex     Change Talk Expressed by the Patient: Desire to change Ability to change Reasons to change Need to change Committment to change    Provider Response to Change Talk: E - Evoked more info from patient about behavior change, A - Affirmed patient's thoughts, decisions, or attempts at behavior change, R - Reflected patient's change talk and S - Summarized patient's change talk statements      Care Plan review completed: No    Medication Review:  No changes to current psychiatric medication(s)     Medication Compliance:  Yes    Changes in Health Issues:   None reported     Chemical Use Review:   Substance Use: Chemical use reviewed, no active concerns identified      Tobacco Use: No current tobacco use.      Assessment: Current Emotional / Mental Status (status of significant symptoms):  Risk status (Self / Other harm or suicidal ideation)  Patient has had a history of suicidal ideation: pt reports hx of SI, severe episodes following the death of her , mother, and sister and suicide attempts: 3 attempted overdose  on insulin   Patient denies current fears or concerns for personal safety.  Patient denies current or recent suicidal ideation or behaviors.  Patient denies current or recent homicidal ideation or behaviors.  Patient denies current or recent self injurious behavior or ideation.  Patient denies other safety concerns.  A safety and risk management plan has been developed including: Patient consented to co-developed safety plan.  A safety and risk management plan was completed.  Patient agreed to use safety plan should any safety concerns arise.  A copy was given to the patient.    Appearance:  Appropriate   Eye Contact:  Good   Psychomotor Behavior: Normal   Attitude:   Cooperative   Orientation:  All  Speech   Rate / Production: Normal    Volume:  Normal   Mood:   Normal  Affect:   Appropriate   Thought Content:  Clear   Thought Form:  Coherent  Goal Directed  Logical   Insight:   Fair     Diagnoses:  1. Schizoaffective disorder, depressive type (H)        Collateral Reports Completed:  Not Applicable    Plan: (Homework, other):  Patient was given information about behavioral services and encouraged to schedule a follow up appointment with the clinic [facility] as needed.  She was also given information about mental health symptoms and treatment options .  CD Recommendations: Maintain Sobriety.     TWILA Barba                                                     [facility] Treatment Plan    Client's Name: Nena Tang  YOB: 1960    Date: 7/29/21    DSM5 Diagnoses: (Sustained by DSM5 Criteria Listed Above)  Diagnoses:  295.70 (F25.0), Schizoaffective disorder, depressive type; 309.81 (F43.10) Posttraumatic Stress Disorder, history of polysubstance use  Psychosocial & Contextual Factors: moving into a group home, strong family support, diabetes      Referral / Collaboration:  Referral to another professional/service is not indicated at this time.    Anticipated number of  session or this episode of care: 3-5      Measurable Treatment Goal(s) related to diagnosis / functional impairment(s)  Goal 1: Client will improve her ability to manage her health needs.     Objective #A (Client Action)    Client will Increase interest, engagement, and pleasure in doing things  Decrease frequency and intensity of feeling down, depressed, hopeless  Improve diet, appetite, mindful eating, and / or meal planning  Identify negative self-talk and behaviors: challenge core beliefs, myths, and actions  Improve management of diabetes.  Status: New - Date: 7/29/21   Intervention(s)  Therapist will teach emotional regulation and goal setting skills using solution focused therapy.      Client has reviewed and agreed to the above plan.      Ev Dixon [facility] 7/29/21           Integrated Behavioral Health Services                                      Patient's Name: Nena Tang  July 29, 2021    SAFETY PLAN:  Step 1: Warning signs / cues (Thoughts, images, mood, situation, behavior) that a crisis may be developing:    Mood: worsening depression, agitation and loneliness    Behaviors: isolating/withdrawing   Step 2: Coping strategies - Things I can do to take my mind off of my problems without contacting another person (relaxation technique, physical activity):    Distress Tolerance Strategies:  spend time with my grandchildren, play bingo, go to the [employer]    Physical Activities: meditation and go for a walk in the park    Focus on helpful thoughts:  remind myself of what is important to me: my grandchildren  Step 3: People and social settings that provide distraction:   Name: April  Phone: [phone]   Name: Shell Phone: [phone]    park and [employer]   Step 4: Remind myself of people and things that are important to me and worth living for:  My grandchildren, my children  Step 5: When I am in crisis, I can ask these people to help me use my safety plan:   Name: April  Phone: [phone]   Name:  Shell Phone: [phone]  Step 6: Making the environment safe:     secure medications: give medications to my son and be around others  Step 7: Professionals or agencies I can contact during a crisis:    Suicide Prevention Lifeline: 4-486-619-ZFKK (2890)    Crisis Text Line Service: Text   [place]  to 723782.    [facility] Crisis Services:  [phone]    Call 911 or go to my nearest emergency department.   I helped develop this safety plan and agree to use it when needed.  I have been given a copy of this plan.      Patient signature: _______________________________________________________________  Today s date:  July 29, 2021  Adapted from Safety Plan Template 2008 Damaris Lowry and Bossman Jasmine is reprinted with the express permission of the authors.  No portion of the Safety Plan Template may be reproduced without the express, written permission.  You can contact the authors at [email or web address] or [email or web address].

## 2021-08-24 NOTE — PROGRESS NOTES
Assessment & Plan     Hospital discharge follow-up  Type 2 diabetes mellitus with mild nonproliferative retinopathy without macular edema, with long-term current use of insulin, unspecified laterality (H)  Uncontrolled diabetes due to medication non-compliance. Patient is moving into a group home at the end of the month and this will be the most helpful for her to have skilled help to manage her medical care.   - insulin aspart (NOVOLOG FLEXPEN) 100 UNIT/ML pen; Inject 10 Units Subcutaneous 3 times daily (with meals)    Fibromyalgia  Chronic pain syndrome  Diarrhea, unspecified type  Struggling with low back pain, does not think her injections were helpful.   Concern that her Topiramate high dose might be causing some of her GI issues. Will send a message to her pain provider- Dr. Paz to evaluate at her next appointment.       I spent a total of 41 minutes on the day of the visit.   Time spent doing chart review, history and exam, documentation and further activities per the note       BMI:   Estimated body mass index is 41.01 kg/m  as calculated from the following:    Height as of 6/21/21: 1.524 m (5').    Weight as of this encounter: 95.3 kg (210 lb).       Patient Instructions   - Continue with Novolog at 10 units with meals.   - Gray (Lantus) pen twice per day.   - Bring glucose machine to clinic.   - Bring medication list from Group Home.     Direct phone number for [facility] pharmacist - [phone]      Return in about 4 weeks (around 9/21/2021) for Routine Visit.    ART Fay CNP  [facility] BLAS Barrett is a 61 year old who presents for the following health issues    HPI     Hospital Follow-up Visit:  Hospital/Nursing Home/IP Rehab Facility: [facility]  Date of Admission: 08/10/2021  Date of Discharge: 08/13/2021  Reason(s) for Admission: Diabetes and blood pressure       Was your hospitalization  related to COVID-19? No   Problems taking medications regularly:  None  Medication changes since discharge: None  Problems adhering to non-medication therapy:  None    Had missed several days of her insulin doses prior to being admitted in the hospital. Patient's blood sugars improved with her current dosing and did not need any changes.     Summary of hospitalization:  [facility] discharge summary reviewed  Diagnostic Tests/Treatments reviewed.  Follow up needed: none  Other Healthcare Providers Involved in Patient s Care:         MTM  Update since discharge: improved.       Post Discharge Medication Reconciliation: discharge medications reconciled, continue medications without change.  Plan of care communicated with patient              Diabetes: Blood sugar was 192 yesterday at noon; fasting. Yesterday 250 after eating. Appetite not as good. Has been taking her novolog 10 units twice per day with meals.   No Lantus this morning. Missing most of her Lantus dosing because she was not having breakfast. Discussed that she needs that dose whether or not she has a meal. Still not drinking soda and no more candy since this last hospitalization.     Mental Health: Patient states that her sister has been crying and begging her to stay with her but she is struggling with managing her care without enough support in the home. Moving [place] Group home [place] [address] Reports that she is feeling down about the move from living with her sister. But she notes that this is the best decision for her to take care of her health.   Still going to the [facility] in [place] three times per week and will continue once she moves to the new residence.     Sleep: Not using a CPAP at home; but reports that she has been sleeping ok.   Appetite: Good. Eating healthy.   Exercise: walking enough. [facility] [employer] [facility] [facility] in [place].     Review of Systems   Constitutional, HEENT, cardiovascular, pulmonary, gi and gu  systems are negative, except as otherwise noted.      Objective    /68   Pulse 82   Temp 96.9  F (36.1  C) (Temporal)   Wt 95.3 kg (210 lb)   LMP 01/06/2015 (Exact Date)   SpO2 97%   BMI 41.01 kg/m    Body mass index is 41.01 kg/m .     Physical Exam   GENERAL: healthy, alert and no distress  EYES: Eyes grossly normal to inspection, PERRL and conjunctivae and sclerae normal  RESP: lungs clear to auscultation - no rales, rhonchi or wheezes  CV: regular rate and rhythm, normal S1 S2, no S3 or S4, no murmur, click or rub, no peripheral edema and peripheral pulses strong  ABDOMEN: soft, nontender and bowel sounds normal  MS: no gross musculoskeletal defects noted, no edema  NEURO: Normal strength and tone, mentation intact and speech normal    Labs Reviewed.

## 2021-08-30 ENCOUNTER — TELEPHONE (OUTPATIENT)
Dept: FAMILY MEDICINE | Facility: CLINIC | Age: 61
End: 2021-08-30
Payer: COMMERCIAL

## 2021-08-30 NOTE — TELEPHONE ENCOUNTER
Sandi calling from [facility]. Patient will be moving in tomorrow.    Med list faxed to  [phone]    Note pharmacy [facility]

## 2021-08-31 ENCOUNTER — MEDICAL CORRESPONDENCE (OUTPATIENT)
Dept: HEALTH INFORMATION MANAGEMENT | Facility: CLINIC | Age: 61
End: 2021-08-31

## 2021-09-02 ENCOUNTER — TELEPHONE (OUTPATIENT)
Dept: FAMILY MEDICINE | Facility: CLINIC | Age: 61
End: 2021-09-02
Payer: COMMERCIAL

## 2021-09-02 DIAGNOSIS — E11.22 TYPE 2 DIABETES MELLITUS WITH STAGE 3 CHRONIC KIDNEY DISEASE, WITH LONG-TERM CURRENT USE OF INSULIN (H): ICD-10-CM

## 2021-09-02 DIAGNOSIS — E11.65 TYPE 2 DIABETES MELLITUS WITH HYPERGLYCEMIA, WITH LONG-TERM CURRENT USE OF INSULIN (H): ICD-10-CM

## 2021-09-02 DIAGNOSIS — Z79.4 TYPE 2 DIABETES MELLITUS WITH STAGE 3 CHRONIC KIDNEY DISEASE, WITH LONG-TERM CURRENT USE OF INSULIN (H): ICD-10-CM

## 2021-09-02 DIAGNOSIS — G89.4 CHRONIC PAIN SYNDROME: ICD-10-CM

## 2021-09-02 DIAGNOSIS — E11.3299 TYPE 2 DIABETES MELLITUS WITH MILD NONPROLIFERATIVE RETINOPATHY WITHOUT MACULAR EDEMA, WITH LONG-TERM CURRENT USE OF INSULIN, UNSPECIFIED LATERALITY (H): ICD-10-CM

## 2021-09-02 DIAGNOSIS — N18.30 TYPE 2 DIABETES MELLITUS WITH STAGE 3 CHRONIC KIDNEY DISEASE, WITH LONG-TERM CURRENT USE OF INSULIN (H): ICD-10-CM

## 2021-09-02 DIAGNOSIS — Z79.4 TYPE 2 DIABETES MELLITUS WITH HYPERGLYCEMIA, WITH LONG-TERM CURRENT USE OF INSULIN (H): ICD-10-CM

## 2021-09-02 DIAGNOSIS — R21 RASH AND NONSPECIFIC SKIN ERUPTION: ICD-10-CM

## 2021-09-02 DIAGNOSIS — M79.7 FIBROMYALGIA: ICD-10-CM

## 2021-09-02 DIAGNOSIS — Z79.4 TYPE 2 DIABETES MELLITUS WITH MILD NONPROLIFERATIVE RETINOPATHY WITHOUT MACULAR EDEMA, WITH LONG-TERM CURRENT USE OF INSULIN, UNSPECIFIED LATERALITY (H): ICD-10-CM

## 2021-09-02 RX ORDER — LANCETS
EACH MISCELLANEOUS
Qty: 100 EACH | Refills: 6 | Status: SHIPPED | OUTPATIENT
Start: 2021-09-02 | End: 2024-01-16

## 2021-09-02 RX ORDER — GLUCOSAMINE HCL/CHONDROITIN SU 500-400 MG
CAPSULE ORAL
Qty: 100 EACH | Refills: 3 | Status: SHIPPED | OUTPATIENT
Start: 2021-09-02 | End: 2021-12-17

## 2021-09-02 RX ORDER — NYSTATIN 100000 [USP'U]/G
POWDER TOPICAL 2 TIMES DAILY PRN
Qty: 45 G | Refills: 1 | Status: SHIPPED | OUTPATIENT
Start: 2021-09-02 | End: 2022-06-21

## 2021-09-02 RX ORDER — FLASH GLUCOSE SENSOR
1 KIT MISCELLANEOUS
Qty: 2 EACH | Refills: 11 | Status: SHIPPED | OUTPATIENT
Start: 2021-09-02 | End: 2021-12-01

## 2021-09-02 RX ORDER — DULAGLUTIDE 1.5 MG/.5ML
1.5 INJECTION, SOLUTION SUBCUTANEOUS WEEKLY
Qty: 4 ML | Refills: 3 | Status: SHIPPED | OUTPATIENT
Start: 2021-09-02 | End: 2022-02-24

## 2021-09-02 NOTE — TELEPHONE ENCOUNTER
"Requested Prescriptions   Signed Prescriptions Disp Refills    alcohol swab prep pads 100 each 3     Sig: Use to swab area of injection/rodolfo as directed.       There is no refill protocol information for this order       blood glucose (NO BRAND SPECIFIED) test strip 100 strip 11     Sig: Use to test blood sugar 4 times daily or as directed.       Diabetic Supplies Protocol Passed - 2021 12:57 PM        Passed - Medication is active on med list        Passed - Patient is 18 years of age or older        Passed - Recent (6 mo) or future (30 days) visit within the authorizing provider's specialty     Patient had office visit in the last 6 months or has a visit in the next 30 days with authorizing provider.  See \"Patient Info\" tab in inbasket, or \"Choose Columns\" in Meds & Orders section of the refill encounter.              Continuous Blood Gluc Sensor (FREESTYLE LEBRON 14 DAY SENSOR) MISC 2 each 11     Si Device every 14 days Change sensor as directed every 14 days       There is no refill protocol information for this order       nystatin (MYCOSTATIN) 818832 UNIT/GM external powder 45 g 1     Sig: Apply topically 2 times daily as needed       Antifungal Agents Passed - 2021 12:57 PM        Passed - Recent (12 mo) or future (30 days) visit within the authorizing provider's specialty     Patient has had an office visit with the authorizing provider or a provider within the authorizing providers department within the previous 12 mos or has a future within next 30 days. See \"Patient Info\" tab in inbasket, or \"Choose Columns\" in Meds & Orders section of the refill encounter.              Passed - Not Fluconazole or Terconazole      If oral Fluconazole or Terconazole, may refill if indicated in progress notes.           Passed - Medication is active on med list          TRULICITY 1.5 MG/0.5ML pen 4 mL 3     Sig: Inject 1.5 mg Subcutaneous once a week Every Friday       GLP-1 Agonists Protocol Passed - 2021 " "12:57 PM        Passed - HgbA1C in past 3 or 6 months     If HgbA1C is 8 or greater, it needs to be on file within the past 3 months.  If less than 8, must be on file within the past 6 months.     Recent Labs   Lab Test 08/10/21  1349   A1C 10.0*             Passed - Medication is active on med list        Passed - Patient is age 18 or older        Passed - No active pregnancy on record        Passed - Normal serum creatinine on file in past 12 months     Recent Labs   Lab Test 08/11/21  0937 05/16/19  0658 05/15/19  1834   CR 0.86   < >  --    CREAT  --   --  0.9    < > = values in this interval not displayed.       Ok to refill medication if creatinine is low          Passed - No positive pregnancy test in past 12 months        Passed - Recent (6 mo) or future (30 days) visit within the authorizing provider's specialty     Patient had office visit in the last 6 months or has a visit in the next 30 days with authorizing provider.  See \"Patient Info\" tab in inbasket, or \"Choose Columns\" in Meds & Orders section of the refill encounter.              thin (NO BRAND SPECIFIED) lancets 100 each 6     Sig: Use with lanceting device. To accompany: Blood Glucose Monitor Brands: per insurance.       Diabetic Supplies Protocol Passed - 9/2/2021 12:57 PM        Passed - Medication is active on med list        Passed - Patient is 18 years of age or older        Passed - Recent (6 mo) or future (30 days) visit within the authorizing provider's specialty     Patient had office visit in the last 6 months or has a visit in the next 30 days with authorizing provider.  See \"Patient Info\" tab in inbasket, or \"Choose Columns\" in Meds & Orders section of the refill encounter.              insulin pen needle (NOVOFINE 30) 30G X 8 MM miscellaneous 400 each 1     Sig: USE 4 DAILY OR AS DIRECTED       Diabetic Supplies Protocol Passed - 9/2/2021 12:57 PM        Passed - Medication is active on med list        Passed - Patient is 18 years " "of age or older        Passed - Recent (6 mo) or future (30 days) visit within the authorizing provider's specialty     Patient had office visit in the last 6 months or has a visit in the next 30 days with authorizing provider.  See \"Patient Info\" tab in inbasket, or \"Choose Columns\" in Meds & Orders section of the refill encounter.               Thanks,  DAVIN Young  [facility]     "

## 2021-09-02 NOTE — TELEPHONE ENCOUNTER
Spoke with Edinson nurse from [facility]. Patient is new to [facility] as of August 31st. Edinson concerned about patient unmanaged DM and feels that med list does not reflect how patient is currently taking her medications. I will enter meds below.     Edinson will try to accompany Nena at next visit with Rowena. If Edinson is unable to be at the apt would like to coordinate care with PCP and MTM to help better manage pts diagnoses. Best call back number for Edinson is [phone].    -Meds reviewed, see changes below:    Acetaminophen (tylenol)- Taking 1 tablet (650 mg) by mouth once daily for pain. Pt asking if she is ok to take more than one tab/daily.     Compounded non-controlled substance-aka lidocaine and maalox mixture no longer taking/resolved.     Fluticasone-salmeterol (Advair)- no longer taking.    Hydroxyzine (vistaril) 25 mg tablet- taking one tablet my mouth at bedtime.     Ipratropium-albuterol (duoneb)- no longer taking.     Ondansetron (zofran)- no longer taking.    topiramate (topamax) 1 tablet 200 mg once a daily.     Trazodone (desyrel) 1 tablet 100 mg at bedtime.     setraline (zoloft)- no longer taking. This was stopped by patient.     Thanks,  DAVIN Young  [facility]

## 2021-09-02 NOTE — TELEPHONE ENCOUNTER
Rowena,    Please see note below. This is just an FYI for patient's future apt with you on Sep 21st.     Thanks,  DAVIN Young  [facility]

## 2021-09-02 NOTE — TELEPHONE ENCOUNTER
Thanks for the update.  I recommend for now we continue the topamax as she is takinmg at bedtime.    Next time I see her, we can discuss whether or not she needs the daytime dose. We would need to use another script to titrate her, so I'll just see if she actually needs it.    Shell Paz MD  [facility]

## 2021-09-02 NOTE — TELEPHONE ENCOUNTER
Dr. Paz,    Patient new to group [place] as of August 31st. Group home nurse called to reconcile medications. Stated that patient told group home nurse she takes her topiramate (topamax) 1 tablet 200 mg once a daily. This is different from how it is prescribed. What is your advice on this?    Thanks,  DAVIN Young  [facility]

## 2021-09-02 NOTE — TELEPHONE ENCOUNTER
Edinson calling, pt now at [facility] calling, pt moved there on 8/31    Please call her at [phone]    Now that pt has moved in they want to clean up the med list and make sure it is up to date    She needs to call the meds to the pharmacy and needs to update list and make sure it is all correct    Rosina Rao, RN, BSN  [facility]

## 2021-09-02 NOTE — TELEPHONE ENCOUNTER
Group home nurse called requesting refills. Meds cued and sent to pool.     Thanks,  DAVIN Young  [facility]

## 2021-09-02 NOTE — TELEPHONE ENCOUNTER
Antonia,    Please see Dr. Zhu's response regarding topamax. This is just an FYI.     Thanks,  DAVIN Young  [facility]

## 2021-09-08 ENCOUNTER — TELEPHONE (OUTPATIENT)
Dept: FAMILY MEDICINE | Facility: CLINIC | Age: 61
End: 2021-09-08

## 2021-09-08 NOTE — TELEPHONE ENCOUNTER
Forms have been completed and faxed back to [facility] @ [phone] , also placed into abstraction to scan into patients chart.        Alysha Gonzalez MA

## 2021-09-08 NOTE — TELEPHONE ENCOUNTER
PA forms faxed to [employer] on 09/08/2021 at [phone]. Forms placed into abstraction for scanning.     Shell Mims MA

## 2021-09-08 NOTE — TELEPHONE ENCOUNTER
Reason for Call:  Form, our goal is to have forms completed with 72 hours, however, some forms may require a visit or additional information.    Type of letter, form or note:  home care Phsician's order Report/ up-to-date medications/treatment plan     Who is the form from?: [facility]     Where did the form come from: form was faxed in    What clinic location was the form placed at?: [facility]    Where the form was placed: [employer]  Box/Folder    What number is listed as a contact on the form?:   PHONE: [phone]  FAX: [phone]       Additional comments: Please review, sign, date and fax back to the number listed above.     Call taken on 9/8/2021 at 1:46 PM by Kristie Montana

## 2021-09-09 DIAGNOSIS — I10 HTN, GOAL BELOW 140/90: ICD-10-CM

## 2021-09-09 DIAGNOSIS — Z79.4 TYPE 2 DIABETES MELLITUS WITH HYPERGLYCEMIA, WITH LONG-TERM CURRENT USE OF INSULIN (H): Primary | ICD-10-CM

## 2021-09-09 DIAGNOSIS — F25.1 SCHIZOAFFECTIVE DISORDER, DEPRESSIVE TYPE (H): ICD-10-CM

## 2021-09-09 DIAGNOSIS — E11.65 TYPE 2 DIABETES MELLITUS WITH HYPERGLYCEMIA, WITH LONG-TERM CURRENT USE OF INSULIN (H): Primary | ICD-10-CM

## 2021-09-09 RX ORDER — AMANTADINE HYDROCHLORIDE 100 MG/1
CAPSULE, GELATIN COATED ORAL
Qty: 60 CAPSULE | Refills: 3 | Status: SHIPPED | OUTPATIENT
Start: 2021-09-09 | End: 2022-01-05

## 2021-09-09 RX ORDER — METOPROLOL SUCCINATE 25 MG/1
50 TABLET, EXTENDED RELEASE ORAL DAILY
Qty: 180 TABLET | Refills: 3 | Status: SHIPPED | OUTPATIENT
Start: 2021-09-09 | End: 2022-01-06

## 2021-09-09 NOTE — TELEPHONE ENCOUNTER
Patients nurse calling to follow up on request for blood glucose monitoring device and lantus. Patient misplaced previous device and now relying on the livia. BS been running very low and wanting to ensure they are getting accurate readings     Requesting refills asap as patient is almost out

## 2021-09-09 NOTE — TELEPHONE ENCOUNTER
"Requested Prescriptions   Pending Prescriptions Disp Refills     amantadine (SYMMETREL) 100 MG capsule 60 capsule 3     Sig: Take 1 capsule (100 mg)  in the morning and 2 capsules (200 mg) in the evening.       There is no refill protocol information for this order      Signed Prescriptions Disp Refills    metoprolol succinate ER (TOPROL-XL) 25 MG 24 hr tablet 180 tablet 3     Sig: Take 2 tablets (50 mg) by mouth daily       Beta-Blockers Protocol Passed - 9/9/2021  1:03 PM        Passed - Blood pressure under 140/90 in past 12 months     BP Readings from Last 3 Encounters:   08/24/21 128/86   08/24/21 128/68   08/13/21 (!) 142/52                 Passed - Patient is age 6 or older        Passed - Recent (12 mo) or future (30 days) visit within the authorizing provider's specialty     Patient has had an office visit with the authorizing provider or a provider within the authorizing providers department within the previous 12 mos or has a future within next 30 days. See \"Patient Info\" tab in inbasket, or \"Choose Columns\" in Meds & Orders section of the refill encounter.              Passed - Medication is active on med list          blood glucose monitoring (NO BRAND SPECIFIED) meter device kit 1 kit 0     Sig: Use to test blood sugar 4 times daily or as directed.       There is no refill protocol information for this order        Routing refill request to provider for review/approval because:  Drug not on the [facility] refill protocol     Hannah Vu RN  [facility]           "

## 2021-09-14 ENCOUNTER — TELEPHONE (OUTPATIENT)
Dept: FAMILY MEDICINE | Facility: CLINIC | Age: 61
End: 2021-09-14

## 2021-09-14 NOTE — TELEPHONE ENCOUNTER
Reason for Call:  Form, our goal is to have forms completed with 72 hours, however, some forms may require a visit or additional information.    Type of letter, form or note:  Home Health Certification    Who is the form from?: Home care    Where did the form come from: form was faxed in    What clinic location was the form placed at?: [facility]    Where the form was placed: [employer]    What number is listed as a contact on the form?:   PHONE: [phone]  FAX: [phone]       Additional comments: PLEASE REVIEW, SIGN, DATE, AND FAX BACK TO THE NUMBER ABOVE.    Call taken on 9/14/2021 at 2:45 PM by Dai Alcantar MA

## 2021-09-16 ENCOUNTER — MEDICAL CORRESPONDENCE (OUTPATIENT)
Dept: HEALTH INFORMATION MANAGEMENT | Facility: CLINIC | Age: 61
End: 2021-09-16

## 2021-09-16 DIAGNOSIS — Z53.9 DIAGNOSIS NOT YET DEFINED: Primary | ICD-10-CM

## 2021-09-16 PROCEDURE — G0179 MD RECERTIFICATION HHA PT: HCPCS | Performed by: NURSE PRACTITIONER

## 2021-09-17 NOTE — TELEPHONE ENCOUNTER
Forms have been completed and faxed back to [facility] @ [phone] , also placed into abstraction to scan into patients chart.        Alysha Gonzalez MA

## 2021-09-21 ENCOUNTER — OFFICE VISIT (OUTPATIENT)
Dept: PHARMACY | Facility: CLINIC | Age: 61
End: 2021-09-21
Payer: COMMERCIAL

## 2021-09-21 ENCOUNTER — OFFICE VISIT (OUTPATIENT)
Dept: BEHAVIORAL HEALTH | Facility: CLINIC | Age: 61
End: 2021-09-21
Payer: COMMERCIAL

## 2021-09-21 ENCOUNTER — OFFICE VISIT (OUTPATIENT)
Dept: FAMILY MEDICINE | Facility: CLINIC | Age: 61
End: 2021-09-21
Payer: COMMERCIAL

## 2021-09-21 VITALS
OXYGEN SATURATION: 97 % | RESPIRATION RATE: 18 BRPM | DIASTOLIC BLOOD PRESSURE: 60 MMHG | TEMPERATURE: 99 F | SYSTOLIC BLOOD PRESSURE: 120 MMHG | HEART RATE: 83 BPM

## 2021-09-21 DIAGNOSIS — Z53.9 ERRONEOUS ENCOUNTER--DISREGARD: Primary | ICD-10-CM

## 2021-09-21 DIAGNOSIS — E11.3299 TYPE 2 DIABETES MELLITUS WITH MILD NONPROLIFERATIVE RETINOPATHY WITHOUT MACULAR EDEMA, WITH LONG-TERM CURRENT USE OF INSULIN, UNSPECIFIED LATERALITY (H): ICD-10-CM

## 2021-09-21 DIAGNOSIS — Z79.4 TYPE 2 DIABETES MELLITUS WITH MILD NONPROLIFERATIVE RETINOPATHY WITHOUT MACULAR EDEMA, WITH LONG-TERM CURRENT USE OF INSULIN, UNSPECIFIED LATERALITY (H): ICD-10-CM

## 2021-09-21 DIAGNOSIS — Z20.822 SUSPECTED COVID-19 VIRUS INFECTION: Primary | ICD-10-CM

## 2021-09-21 PROCEDURE — 99214 OFFICE O/P EST MOD 30 MIN: CPT | Performed by: NURSE PRACTITIONER

## 2021-09-21 NOTE — PROGRESS NOTES
Assessment & Plan     Suspected COVID-19 virus infection  Symptomatic. Needs to be tested today or tomorrow at the latest tomorrow.   -Seek further medication attention if symptoms worsen.   - Follow-up next week if negative, otherwise after her quarantine period.     Type 2 diabetes mellitus with mild nonproliferative retinopathy without macular edema, with long-term current use of insulin, unspecified laterality (H)  Improved diabetes control with having skilled care to help manage medical needs.       I spent a total of 20 minutes on the day of the visit.   Time spent doing chart review, history and exam, documentation and further activities per the note       BMI:   Estimated body mass index is 41.01 kg/m  as calculated from the following:    Height as of 9/25/21: 1.524 m (5').    Weight as of 9/25/21: 95.3 kg (210 lb).   Weight management plan: Discussed healthy diet and exercise guidelines    Patient Instructions   - Needs a COVID test.   - Continue with insulins as prescribed.   - Appointment next week at 11 am- in person if negative for Covid -19.      Return in about 1 week (around 9/28/2021).    ART Fay CNP  [facility]    Surjit Barrett is a 61 year old who presents for the following health issues with her :     HPI     Covid-19 Symptoms: Patient presenting today in clinic with a new cough from yesterday and also complaining about some mild chest pain and generalized fatigue. Patient is currently in a group home and has not been tested for Covid-19. Recommended that she gets tested right away and seek further medical attention if her symptoms worsen.     Patient would also like refills on her cough medicine, clonazepam and would also like some cough drops.     Diabetes Follow-up  Free style livia, not giving accurate numbers. Blood sugars are very labile with the current machines. Staff is trying to manage her blood sugars at the drop in  center and at the group home with a blood sugar meter.   BG ranging ; continue with current insulin dosing with no changes.   How often are you checking your blood sugar? Four or more times daily  Blood sugar testing frequency justification:  Uncontrolled diabetes  What time of day are you checking your blood sugars (select all that apply)?  Before and after meals, At bedtime and Fasting   Have you had any blood sugars above 200?  Yes a couple  Have you had any blood sugars below 70?  No    What symptoms do you notice when your blood sugar is low?  Shaky, Weak and Lethargy    What concerns do you have today about your diabetes? Low blood sugar                Hypertension Follow-up  Weekly BP's at the group home.     Do you check your blood pressure regularly outside of the clinic? Yes     Are you following a low salt diet? No    Are your blood pressures ever more than 140 on the top number (systolic) OR more   than 90 on the bottom number (diastolic), for example 140/90? Yes    BP Readings from Last 2 Encounters:   08/24/21 128/86   08/24/21 128/68     Hemoglobin A1C (%)   Date Value   08/10/2021 10.0 (H)   07/27/2021 9.5 (H)   12/01/2020 9.1 (H)   09/15/2020 9.7 (H)     LDL Cholesterol Calculated (mg/dL)   Date Value   12/01/2020 141 (H)   12/22/2019 86     Review of Systems   Constitutional, HEENT, cardiovascular, pulmonary, gi and gu systems are negative, except as otherwise noted.      Objective    /60 (BP Location: Right arm, Patient Position: Sitting)   Pulse 83   Temp 99  F (37.2  C)   Resp 18   LMP 01/06/2015 (Exact Date)   SpO2 97%   There is no height or weight on file to calculate BMI.  Physical Exam   GENERAL: healthy, no distress and fatigued  EYES: Eyes grossly normal to inspection, PERRL and conjunctivae and sclerae normal  RESP: lungs clear to auscultation - no rales, rhonchi or wheezes  CV: regular rate and rhythm, normal S1 S2, no S3 or S4, no murmur, click or rub, no peripheral  edema and peripheral pulses strong  MS: no gross musculoskeletal defects noted, no edema  NEURO: Normal strength and tone, mentation intact and speech normal      Labs Reviewed.

## 2021-09-21 NOTE — PATIENT INSTRUCTIONS
- Needs a COVID test.   - Continue with insulins as prescribed.   - Appointment next week at 11 am- in person if negative for Covid -19.

## 2021-09-21 NOTE — PROGRESS NOTES
Patient has Covid symptoms, see PCP notes for details. Visit rescheduled for next week pending negative Covid.     Eugenia De Jesus, KiD, BCACP

## 2021-09-21 NOTE — PROGRESS NOTES
[facility] did not see patient due to nature of visit, okay per PCP. Will participate in co-visit at later date.

## 2021-09-22 ENCOUNTER — TELEPHONE (OUTPATIENT)
Dept: FAMILY MEDICINE | Facility: CLINIC | Age: 61
End: 2021-09-22

## 2021-09-22 DIAGNOSIS — E11.22 TYPE 2 DIABETES MELLITUS WITH STAGE 3 CHRONIC KIDNEY DISEASE, WITH LONG-TERM CURRENT USE OF INSULIN, UNSPECIFIED WHETHER STAGE 3A OR 3B CKD (H): ICD-10-CM

## 2021-09-22 DIAGNOSIS — Z79.4 TYPE 2 DIABETES MELLITUS WITH HYPERGLYCEMIA, WITH LONG-TERM CURRENT USE OF INSULIN (H): ICD-10-CM

## 2021-09-22 DIAGNOSIS — Z79.4 TYPE 2 DIABETES MELLITUS WITH STAGE 3 CHRONIC KIDNEY DISEASE, WITH LONG-TERM CURRENT USE OF INSULIN, UNSPECIFIED WHETHER STAGE 3A OR 3B CKD (H): ICD-10-CM

## 2021-09-22 DIAGNOSIS — N18.30 TYPE 2 DIABETES MELLITUS WITH STAGE 3 CHRONIC KIDNEY DISEASE, WITH LONG-TERM CURRENT USE OF INSULIN, UNSPECIFIED WHETHER STAGE 3A OR 3B CKD (H): ICD-10-CM

## 2021-09-22 DIAGNOSIS — E11.65 TYPE 2 DIABETES MELLITUS WITH HYPERGLYCEMIA, WITH LONG-TERM CURRENT USE OF INSULIN (H): ICD-10-CM

## 2021-09-22 DIAGNOSIS — F25.1 SCHIZOAFFECTIVE DISORDER, DEPRESSIVE TYPE (H): ICD-10-CM

## 2021-09-22 DIAGNOSIS — F25.0 SCHIZOAFFECTIVE DISORDER, BIPOLAR TYPE (H): ICD-10-CM

## 2021-09-22 DIAGNOSIS — F51.04 PSYCHOPHYSIOLOGICAL INSOMNIA: ICD-10-CM

## 2021-09-22 NOTE — TELEPHONE ENCOUNTER
[place] pharmacy calling about clonazepam-awaiting MD signature to fill.    Group home patient, no longer independent.     Hydroxyzine being given every night-needs refill, should be written as needed or should be scheduled every night?    Taking 650 mg tylenol nightly as well.     Difficulty with BG sensors, using meter to test 10x/day. Need more strips-need rx to state to test more often (10x/day) for insurance purposes. Strips and lancets    Paliperidone ER enough left to fill meds-needs refill    Po JAEGER old rx from Jan to use BID as needed-needs refill    Antonella Robledo, RN

## 2021-09-22 NOTE — TELEPHONE ENCOUNTER
Requested Prescriptions   Pending Prescriptions Disp Refills     clonazePAM (KLONOPIN) 0.5 MG tablet 30 tablet 0     Sig: Take 1 tablet (0.5 mg) by mouth At Bedtime       There is no refill protocol information for this order        Routing refill request to provider for review/approval because:  Drug not on the [facility] refill protocol     Hannah Vu RN  [facility]

## 2021-09-22 NOTE — TELEPHONE ENCOUNTER
Reason for Call:  Medication or medication refill:    Do you use a [facility]?  Name of the pharmacy and phone number for the current request:    [id number] [place] [address] (Pharmacy) [phone]         Name of the medication requested: clonazePAM (KLONOPIN) 0.5 MG tablet    Other request: CONTROLLED SUBSTANCE    Can we leave a detailed message on this number? YES    Phone number patient can be reached at: Cell number on file:    Telephone Information:   Mobile [phone]       Best Time: ANY    Call taken on 9/22/2021 at 1:26 PM by Dai Alcantar MA

## 2021-09-23 DIAGNOSIS — F25.1 SCHIZOAFFECTIVE DISORDER, DEPRESSIVE TYPE (H): ICD-10-CM

## 2021-09-23 DIAGNOSIS — K59.00 CONSTIPATION, UNSPECIFIED CONSTIPATION TYPE: ICD-10-CM

## 2021-09-23 RX ORDER — CLONAZEPAM 0.5 MG/1
0.5 TABLET ORAL AT BEDTIME
Qty: 30 TABLET | Refills: 0 | Status: SHIPPED | OUTPATIENT
Start: 2021-09-23 | End: 2021-10-25

## 2021-09-23 RX ORDER — PALIPERIDONE 9 MG/1
9 TABLET, EXTENDED RELEASE ORAL AT BEDTIME
Qty: 30 TABLET | Refills: 2 | Status: SHIPPED | OUTPATIENT
Start: 2021-09-23 | End: 2021-12-17

## 2021-09-23 RX ORDER — HYDROXYZINE PAMOATE 25 MG/1
25 CAPSULE ORAL EVERY 4 HOURS PRN
Qty: 60 CAPSULE | Refills: 3 | Status: SHIPPED | OUTPATIENT
Start: 2021-09-23 | End: 2022-01-26

## 2021-09-23 RX ORDER — AMOXICILLIN 250 MG
1 CAPSULE ORAL 2 TIMES DAILY PRN
Qty: 60 TABLET | Refills: 10 | Status: SHIPPED | OUTPATIENT
Start: 2021-09-23 | End: 2021-11-30

## 2021-09-23 RX ORDER — AMANTADINE HYDROCHLORIDE 100 MG/1
CAPSULE, GELATIN COATED ORAL
Qty: 60 CAPSULE | Refills: 3 | Status: CANCELLED | OUTPATIENT
Start: 2021-09-23

## 2021-09-23 NOTE — TELEPHONE ENCOUNTER
Called pharmacy and gave verbal order to change dispense quantity to #90 for 1 month supply.    Po approved per [facility] protocol.    Crissy Pulido RN  [facility]

## 2021-09-23 NOTE — TELEPHONE ENCOUNTER
Reason for Call:  Medication or medication refill:    Do you use a [facility] [place] [facility]?  Name of the pharmacy and phone number for the current request:    [id number] [place] [address] (Pharmacy) [phone]       Name of the medication requested: amantadine (SYMMETREL) 100 MG capsule,   senna-docusate (SENOKOT-S/PERICOLACE) 8.6-50 MG tablet    Other request: AMEND AMANTADINE RX TO 90 COUNT CAPSULES PER REFILL TO SUPPORT 30 DAYS OF USE WITH CURRENT DIRECTIONS FOR THAT MEDICATION   RX WRITTEN FOR 60 TABLETS BUT 3 TABS DAILY, NEED 90 COUNT RX FOR 30 DAY SUPPLY    Can we leave a detailed message on this number? YES    Phone number patient can be reached at: Cell number on file:    Telephone Information:   Mobile [phone]       Best Time: ANY    Call taken on 9/23/2021 at 1:56 PM by Dai Alcantar MA

## 2021-09-24 ENCOUNTER — NURSE TRIAGE (OUTPATIENT)
Dept: FAMILY MEDICINE | Facility: CLINIC | Age: 61
End: 2021-09-24

## 2021-09-24 NOTE — TELEPHONE ENCOUNTER
Pharmacy returned call and were able to get diabetic supplies through insurance for testing up to 10 times a day. They will do 90 supply of the lancets and strips for the Pt.         Manuel Corey RN   [facility]

## 2021-09-24 NOTE — TELEPHONE ENCOUNTER
Amal with [facility] was in clinic with Pt on 9/21/21 and tested for Covid which was positive.   Group home would like to get retested-no retesting recommended per protocol.     [facility] 9/22 positive test.   9/21 worsening cough, followed by diarrhea, body aches, and loss of taste and smell.       Reason for Disposition    COVID-19 Testing, questions about    Protocols used: CORONAVIRUS (COVID-19) DIAGNOSED OR PGLGVAVIS-B-SL 3.25    Reviewed isolation with Amal   Per CDC:   Persons with COVID-19 who have symptoms and were directed to care for themselves at home may discontinue isolation under the following conditions:    At least 10 days* have passed since symptom onset and  At least 24 hours have passed since resolution of fever without the use of fever-reducing medications and  Other symptoms have improved.    Problems with Fresestyle livia, giving incorrect readings when compared to a manual BS,  Need to contact  again regarding the Freestyle meter and problems.   Amal will call freestyle today to determine what can be done to fix meter.     FREESTYLE LIVIA 14 DAY SENSOR  196 manual on machine   126 freestyle     Low 100's machine indicated 45     F/u appt scheduled   Future Appointments   Date Time Provider Department Center   10/28/2021 11:00 AM Rowena Haas APRN CNP [facility] [address]   10/28/2021 11:00 AM Eugenia De Jesus, [facility]     Diabetic testing supplies were sent to the pharmacy yesterday. LM for Dinorah regarding the prescription and to call back if not able to run the script through insurance.       Manuel Corey RN   [facility]

## 2021-09-25 ENCOUNTER — HOSPITAL ENCOUNTER (INPATIENT)
Facility: CLINIC | Age: 61
LOS: 4 days | Discharge: HOME HEALTH | DRG: 177 | End: 2021-09-29
Attending: EMERGENCY MEDICINE | Admitting: HOSPITALIST
Payer: COMMERCIAL

## 2021-09-25 ENCOUNTER — APPOINTMENT (OUTPATIENT)
Dept: GENERAL RADIOLOGY | Facility: CLINIC | Age: 61
DRG: 177 | End: 2021-09-25
Attending: EMERGENCY MEDICINE
Payer: COMMERCIAL

## 2021-09-25 ENCOUNTER — NURSE TRIAGE (OUTPATIENT)
Dept: NURSING | Facility: CLINIC | Age: 61
End: 2021-09-25

## 2021-09-25 DIAGNOSIS — E86.0 DEHYDRATION: ICD-10-CM

## 2021-09-25 DIAGNOSIS — E11.3299 TYPE 2 DIABETES MELLITUS WITH MILD NONPROLIFERATIVE RETINOPATHY WITHOUT MACULAR EDEMA, WITH LONG-TERM CURRENT USE OF INSULIN, UNSPECIFIED LATERALITY (H): ICD-10-CM

## 2021-09-25 DIAGNOSIS — R19.7 DIARRHEA, UNSPECIFIED TYPE: ICD-10-CM

## 2021-09-25 DIAGNOSIS — N17.9 ACUTE KIDNEY INJURY (H): ICD-10-CM

## 2021-09-25 DIAGNOSIS — Z79.4 TYPE 2 DIABETES MELLITUS WITH MILD NONPROLIFERATIVE RETINOPATHY WITHOUT MACULAR EDEMA, WITH LONG-TERM CURRENT USE OF INSULIN, UNSPECIFIED LATERALITY (H): ICD-10-CM

## 2021-09-25 DIAGNOSIS — E16.2 HYPOGLYCEMIA: ICD-10-CM

## 2021-09-25 DIAGNOSIS — U07.1 2019 NOVEL CORONAVIRUS DISEASE (COVID-19): ICD-10-CM

## 2021-09-25 LAB
ALBUMIN SERPL-MCNC: 3.1 G/DL (ref 3.4–5)
ALP SERPL-CCNC: 68 U/L (ref 40–150)
ALT SERPL W P-5'-P-CCNC: 31 U/L (ref 0–50)
ANION GAP SERPL CALCULATED.3IONS-SCNC: 12 MMOL/L (ref 3–14)
AST SERPL W P-5'-P-CCNC: 29 U/L (ref 0–45)
BASE EXCESS BLDV CALC-SCNC: -6.3 MMOL/L (ref -7.7–1.9)
BASOPHILS # BLD AUTO: 0 10E3/UL (ref 0–0.2)
BASOPHILS NFR BLD AUTO: 0 %
BILIRUB SERPL-MCNC: 0.6 MG/DL (ref 0.2–1.3)
BUN SERPL-MCNC: 22 MG/DL (ref 7–30)
CALCIUM SERPL-MCNC: 8.5 MG/DL (ref 8.5–10.1)
CHLORIDE BLD-SCNC: 104 MMOL/L (ref 94–109)
CO2 SERPL-SCNC: 18 MMOL/L (ref 20–32)
CREAT SERPL-MCNC: 1.71 MG/DL (ref 0.52–1.04)
EOSINOPHIL # BLD AUTO: 0.1 10E3/UL (ref 0–0.7)
EOSINOPHIL NFR BLD AUTO: 2 %
ERYTHROCYTE [DISTWIDTH] IN BLOOD BY AUTOMATED COUNT: 12.8 % (ref 10–15)
GFR SERPL CREATININE-BSD FRML MDRD: 32 ML/MIN/1.73M2
GLUCOSE BLD-MCNC: 43 MG/DL (ref 70–99)
GLUCOSE BLDC GLUCOMTR-MCNC: 146 MG/DL (ref 70–99)
HBA1C MFR BLD: 8.1 % (ref 0–5.6)
HCO3 BLDV-SCNC: 19 MMOL/L (ref 21–28)
HCT VFR BLD AUTO: 30.9 % (ref 35–47)
HGB BLD-MCNC: 10.1 G/DL (ref 11.7–15.7)
HOLD SPECIMEN: NORMAL
HOLD SPECIMEN: NORMAL
IMM GRANULOCYTES # BLD: 0 10E3/UL
IMM GRANULOCYTES NFR BLD: 0 %
LACTATE SERPL-SCNC: 0.7 MMOL/L (ref 0.7–2)
LYMPHOCYTES # BLD AUTO: 1.7 10E3/UL (ref 0.8–5.3)
LYMPHOCYTES NFR BLD AUTO: 23 %
MAGNESIUM SERPL-MCNC: 1.9 MG/DL (ref 1.6–2.3)
MCH RBC QN AUTO: 31.8 PG (ref 26.5–33)
MCHC RBC AUTO-ENTMCNC: 32.7 G/DL (ref 31.5–36.5)
MCV RBC AUTO: 97 FL (ref 78–100)
MONOCYTES # BLD AUTO: 0.5 10E3/UL (ref 0–1.3)
MONOCYTES NFR BLD AUTO: 6 %
NEUTROPHILS # BLD AUTO: 5 10E3/UL (ref 1.6–8.3)
NEUTROPHILS NFR BLD AUTO: 69 %
NRBC # BLD AUTO: 0 10E3/UL
NRBC BLD AUTO-RTO: 0 /100
O2/TOTAL GAS SETTING VFR VENT: 0 %
PCO2 BLDV: 36 MM HG (ref 40–50)
PH BLDV: 7.33 [PH] (ref 7.32–7.43)
PLATELET # BLD AUTO: 177 10E3/UL (ref 150–450)
PO2 BLDV: 26 MM HG (ref 25–47)
POTASSIUM BLD-SCNC: 3.8 MMOL/L (ref 3.4–5.3)
PROT SERPL-MCNC: 8 G/DL (ref 6.8–8.8)
RBC # BLD AUTO: 3.18 10E6/UL (ref 3.8–5.2)
SODIUM SERPL-SCNC: 134 MMOL/L (ref 133–144)
TROPONIN I SERPL-MCNC: <0.015 UG/L (ref 0–0.04)
WBC # BLD AUTO: 7.4 10E3/UL (ref 4–11)

## 2021-09-25 PROCEDURE — 93005 ELECTROCARDIOGRAM TRACING: CPT

## 2021-09-25 PROCEDURE — 99223 1ST HOSP IP/OBS HIGH 75: CPT | Mod: AI | Performed by: HOSPITALIST

## 2021-09-25 PROCEDURE — 82435 ASSAY OF BLOOD CHLORIDE: CPT | Performed by: EMERGENCY MEDICINE

## 2021-09-25 PROCEDURE — 250N000013 HC RX MED GY IP 250 OP 250 PS 637: Performed by: HOSPITALIST

## 2021-09-25 PROCEDURE — 71045 X-RAY EXAM CHEST 1 VIEW: CPT

## 2021-09-25 PROCEDURE — 80053 COMPREHEN METABOLIC PANEL: CPT | Performed by: EMERGENCY MEDICINE

## 2021-09-25 PROCEDURE — 250N000011 HC RX IP 250 OP 636: Performed by: HOSPITALIST

## 2021-09-25 PROCEDURE — 87040 BLOOD CULTURE FOR BACTERIA: CPT | Performed by: EMERGENCY MEDICINE

## 2021-09-25 PROCEDURE — 99285 EMERGENCY DEPT VISIT HI MDM: CPT | Mod: 25

## 2021-09-25 PROCEDURE — 36415 COLL VENOUS BLD VENIPUNCTURE: CPT | Performed by: EMERGENCY MEDICINE

## 2021-09-25 PROCEDURE — 85025 COMPLETE CBC W/AUTO DIFF WBC: CPT | Performed by: EMERGENCY MEDICINE

## 2021-09-25 PROCEDURE — 83735 ASSAY OF MAGNESIUM: CPT | Performed by: EMERGENCY MEDICINE

## 2021-09-25 PROCEDURE — 83605 ASSAY OF LACTIC ACID: CPT | Performed by: EMERGENCY MEDICINE

## 2021-09-25 PROCEDURE — 96374 THER/PROPH/DIAG INJ IV PUSH: CPT

## 2021-09-25 PROCEDURE — 258N000001 HC RX 258: Performed by: EMERGENCY MEDICINE

## 2021-09-25 PROCEDURE — 120N000001 HC R&B MED SURG/OB

## 2021-09-25 PROCEDURE — 83036 HEMOGLOBIN GLYCOSYLATED A1C: CPT | Performed by: HOSPITALIST

## 2021-09-25 PROCEDURE — 82803 BLOOD GASES ANY COMBINATION: CPT | Performed by: EMERGENCY MEDICINE

## 2021-09-25 PROCEDURE — 84484 ASSAY OF TROPONIN QUANT: CPT | Performed by: EMERGENCY MEDICINE

## 2021-09-25 RX ORDER — ASPIRIN 81 MG/1
81 TABLET ORAL DAILY
Status: DISCONTINUED | OUTPATIENT
Start: 2021-09-26 | End: 2021-09-29 | Stop reason: HOSPADM

## 2021-09-25 RX ORDER — ATORVASTATIN CALCIUM 80 MG/1
80 TABLET, FILM COATED ORAL DAILY
Status: DISCONTINUED | OUTPATIENT
Start: 2021-09-26 | End: 2021-09-29 | Stop reason: HOSPADM

## 2021-09-25 RX ORDER — CLONAZEPAM 0.5 MG/1
0.5 TABLET ORAL AT BEDTIME
Status: DISCONTINUED | OUTPATIENT
Start: 2021-09-25 | End: 2021-09-29 | Stop reason: HOSPADM

## 2021-09-25 RX ORDER — ONDANSETRON 4 MG/1
4 TABLET, ORALLY DISINTEGRATING ORAL EVERY 6 HOURS PRN
Status: DISCONTINUED | OUTPATIENT
Start: 2021-09-25 | End: 2021-09-29 | Stop reason: HOSPADM

## 2021-09-25 RX ORDER — ESCITALOPRAM OXALATE 10 MG/1
10 TABLET ORAL DAILY
Status: DISCONTINUED | OUTPATIENT
Start: 2021-09-26 | End: 2021-09-26

## 2021-09-25 RX ORDER — AMANTADINE HYDROCHLORIDE 100 MG/1
100 CAPSULE, GELATIN COATED ORAL DAILY
Status: DISCONTINUED | OUTPATIENT
Start: 2021-09-26 | End: 2021-09-25

## 2021-09-25 RX ORDER — FAMOTIDINE 20 MG/1
20 TABLET, FILM COATED ORAL DAILY
Status: DISCONTINUED | OUTPATIENT
Start: 2021-09-26 | End: 2021-09-29 | Stop reason: HOSPADM

## 2021-09-25 RX ORDER — AMANTADINE HYDROCHLORIDE 100 MG/1
200 CAPSULE, GELATIN COATED ORAL EVERY EVENING
COMMUNITY
End: 2021-10-07

## 2021-09-25 RX ORDER — TRAZODONE HYDROCHLORIDE 100 MG/1
100 TABLET ORAL
Status: DISCONTINUED | OUTPATIENT
Start: 2021-09-25 | End: 2021-09-29 | Stop reason: HOSPADM

## 2021-09-25 RX ORDER — METOPROLOL SUCCINATE 50 MG/1
50 TABLET, EXTENDED RELEASE ORAL DAILY
Status: DISCONTINUED | OUTPATIENT
Start: 2021-09-26 | End: 2021-09-29 | Stop reason: HOSPADM

## 2021-09-25 RX ORDER — SODIUM CHLORIDE AND POTASSIUM CHLORIDE 150; 900 MG/100ML; MG/100ML
INJECTION, SOLUTION INTRAVENOUS CONTINUOUS
Status: ACTIVE | OUTPATIENT
Start: 2021-09-25 | End: 2021-09-26

## 2021-09-25 RX ORDER — ONDANSETRON 2 MG/ML
4 INJECTION INTRAMUSCULAR; INTRAVENOUS EVERY 30 MIN PRN
Status: DISCONTINUED | OUTPATIENT
Start: 2021-09-25 | End: 2021-09-25

## 2021-09-25 RX ORDER — DEXTROSE MONOHYDRATE 25 G/50ML
25-50 INJECTION, SOLUTION INTRAVENOUS
Status: DISCONTINUED | OUTPATIENT
Start: 2021-09-25 | End: 2021-09-29 | Stop reason: HOSPADM

## 2021-09-25 RX ORDER — LIDOCAINE 40 MG/G
CREAM TOPICAL
Status: DISCONTINUED | OUTPATIENT
Start: 2021-09-25 | End: 2021-09-29 | Stop reason: HOSPADM

## 2021-09-25 RX ORDER — NICOTINE POLACRILEX 4 MG
15-30 LOZENGE BUCCAL
Status: DISCONTINUED | OUTPATIENT
Start: 2021-09-25 | End: 2021-09-29 | Stop reason: HOSPADM

## 2021-09-25 RX ORDER — PANTOPRAZOLE SODIUM 40 MG/1
40 TABLET, DELAYED RELEASE ORAL DAILY
Status: DISCONTINUED | OUTPATIENT
Start: 2021-09-26 | End: 2021-09-29 | Stop reason: HOSPADM

## 2021-09-25 RX ORDER — DEXTROSE MONOHYDRATE 25 G/50ML
50 INJECTION, SOLUTION INTRAVENOUS ONCE
Status: COMPLETED | OUTPATIENT
Start: 2021-09-25 | End: 2021-09-25

## 2021-09-25 RX ORDER — GABAPENTIN 300 MG/1
300 CAPSULE ORAL AT BEDTIME
Status: DISCONTINUED | OUTPATIENT
Start: 2021-09-25 | End: 2021-09-29 | Stop reason: HOSPADM

## 2021-09-25 RX ORDER — ACETAMINOPHEN 650 MG/1
650 SUPPOSITORY RECTAL EVERY 6 HOURS PRN
Status: DISCONTINUED | OUTPATIENT
Start: 2021-09-25 | End: 2021-09-29 | Stop reason: HOSPADM

## 2021-09-25 RX ORDER — ONDANSETRON 2 MG/ML
4 INJECTION INTRAMUSCULAR; INTRAVENOUS EVERY 6 HOURS PRN
Status: DISCONTINUED | OUTPATIENT
Start: 2021-09-25 | End: 2021-09-29 | Stop reason: HOSPADM

## 2021-09-25 RX ORDER — TOPIRAMATE 50 MG/1
200 TABLET, FILM COATED ORAL DAILY
Status: DISCONTINUED | OUTPATIENT
Start: 2021-09-26 | End: 2021-09-29 | Stop reason: HOSPADM

## 2021-09-25 RX ORDER — CALCIUM CARBONATE 500 MG/1
1000 TABLET, CHEWABLE ORAL 4 TIMES DAILY PRN
Status: DISCONTINUED | OUTPATIENT
Start: 2021-09-25 | End: 2021-09-29 | Stop reason: HOSPADM

## 2021-09-25 RX ORDER — ACETAMINOPHEN 325 MG/1
650 TABLET ORAL EVERY 6 HOURS PRN
Status: DISCONTINUED | OUTPATIENT
Start: 2021-09-25 | End: 2021-09-29 | Stop reason: HOSPADM

## 2021-09-25 RX ORDER — AMANTADINE HYDROCHLORIDE 100 MG/1
100 CAPSULE, GELATIN COATED ORAL DAILY
Status: DISCONTINUED | OUTPATIENT
Start: 2021-09-26 | End: 2021-09-29 | Stop reason: HOSPADM

## 2021-09-25 RX ADMIN — DEXTROSE MONOHYDRATE 50 ML: 500 INJECTION PARENTERAL at 20:04

## 2021-09-25 RX ADMIN — POTASSIUM CHLORIDE AND SODIUM CHLORIDE: 900; 150 INJECTION, SOLUTION INTRAVENOUS at 23:56

## 2021-09-25 RX ADMIN — ACETAMINOPHEN 650 MG: 325 TABLET, FILM COATED ORAL at 23:56

## 2021-09-25 RX ADMIN — GABAPENTIN 300 MG: 300 CAPSULE ORAL at 23:56

## 2021-09-25 ASSESSMENT — MIFFLIN-ST. JEOR: SCORE: 1444.05

## 2021-09-25 NOTE — TELEPHONE ENCOUNTER
DAVIN Neves from [facility] calls with patient's current symptoms.  Patient tested positive for covid on 9-22-21  Symptoms include body aches, diarrhea, intermittent cough, nausea , headache and moderate/mild shortness of breath.  SpO2 89% @ 0600, otherwise 91-93%. Group home does not have ability to continuously monitor oxygen.  Patient is a poor historian. Unsure if one-word answers are baseline or breathing related.  Patient has been afebrile.   Disposition is to go to ED. Edinson will take her to [facility] now.  Maria Elena Horton RN  [facility] Nurse Advisor  5:09 PM 9/25/2021    Reason for Disposition    MODERATE difficulty breathing (e.g., speaks in phrases, SOB even at rest, pulse 100-120)     Pulse 70s    Additional Information    Negative: SEVERE difficulty breathing (e.g., struggling for each breath, speaks in single words)    Negative: Difficult to awaken or acting confused (e.g., disoriented, slurred speech)    Negative: Bluish (or gray) lips or face now    Negative: Shock suspected (e.g., cold/pale/clammy skin, too weak to stand, low BP, rapid pulse)    Negative: Sounds like a life-threatening emergency to the triager    Negative: SEVERE or constant chest pain or pressure (Exception: mild central chest pain, present only when coughing)    Protocols used: CORONAVIRUS (COVID-19) DIAGNOSED OR BWZGETURU-F-DX 3.25  COVID 19 Nurse Triage Plan/Patient Instructions    Please be aware that novel coronavirus (COVID-19) may be circulating in the community. If you develop symptoms such as fever, cough, or SOB or if you have concerns about the presence of another infection including coronavirus (COVID-19), please contact your health care provider or visit [email or web address].     Disposition/Instructions    ED Visit recommended. Follow protocol based instructions.     Bring Your Own Device:  Please also bring your smart device(s) (smart phones, tablets, laptops) and their charging cables  for your personal use and to communicate with your care team during your visit.    Thank you for taking steps to prevent the spread of this virus.  o Limit your contact with others.  o Wear a simple mask to cover your cough.  o Wash your hands well and often.    Resources    [facility]: About COVID-19: [email or web address]    CDC: What to Do If You're Sick: www.cdc.gov/coronavirus/2019-ncov/about/steps-when-sick.html    CDC: Ending Home Isolation: www.cdc.gov/coronavirus/2019-ncov/hcp/disposition-in-home-patients.html     CDC: Caring for Someone: www.cdc.gov/coronavirus/2019-ncov/if-you-are-sick/care-for-someone.html     [facility]: Interim Guidance for Hospital Discharge to Home: www.[facility].[place]/diseases/coronavirus/hcp/hospdischarge.pdf    [facility] clinical trials (COVID-19 research studies): [email or web address]-clinical-trials     Below are the COVID-19 hotlines at the [place] Department of [facility] ([facility]). Interpreters are available.   o For health questions: Call [phone] or [phone] (7 a.m. to 7 p.m.)  o For questions about schools and childcare: Call [phone] or 1-548.334.2028 (7 a.m. to 7 p.m.)

## 2021-09-25 NOTE — ED TRIAGE NOTES
Fatigue, dry cough, nausea today.  Diarrhea last couple of days.  Increase in symptoms & concerned she has pneumonia.  Covid + on 9/24.

## 2021-09-25 NOTE — ED PROVIDER NOTES
History     Chief Complaint:  Shortness of Breath      HPI   Nena Tang is a 61 year old female with a history of CAD, hypertension, and diabetes who presents with concern for cough and fatigue with a known diagnosis of COVID-19 infection.  She is fully vaccinated with the Moderna COVID-19 mRNA vaccination.  Her  states that she always has a cough and this is chronic for her but it seemed like she was getting a worsening cough on Monday, 6 days prior.  On Wednesday, 9/22 they were able to get an appointment for COVID-19 testing.  They received the positive results on Friday, 9/24, yesterday.  He states that she was doing well other than a mild cough but then over the past day or so she has had diarrhea and she has been incredibly fatigued, not wanting to get up or do anything.  She does have a history of diabetes and she has a monitoring device but it runs high and they actually had it checked in the clinic this week and they think it is not working appropriately.  Her fingerstick glucose checks have apparently been normal.  She has not received monoclonal antibodies but I discussed with her nurse Edinson who states that this is something they could get her to if she qualifies and we are able to arrange it as an outpatient.  The states she is not on home oxygen.  No significant fevers.  No other complaints at this time.    Review of Systems  As noted per HPI.  Remainder of a 10 point review of systems was negative.    Allergies:  Imidazole Antifungals  Ketoprofen  Lisinopril  Metformin  Metronidazole  Posaconazole    Medications:    Symmetrel   Lipitor   Tessalon   Klonopin   Lexapro   Neurontin   Vistaril   Novolog   Lantus   Cozaar   Toprol   Mycostatin   Invega   Protonix   Topamax   Deseryl   Trulicity   Albuterol     Past Medical History:    CAD  Cocaine abuse   Heroin abuse   Hypertension   IBS   Migraine   Depression   Obesity   Sleep apnea   Schizoaffective disorder   Takotsubo  cardiomyopathy   Diabetes   Uterine cancer   Fibromyalgia   Gastroenteritis   PTSD   Encephalopathy     Past Surgical History:    Oophorectomy   Cataract   Cholecystectomy   Hysterectomy   Coronary CTA   Trigger finger release x2     Family History:    Mother- bladder cancer, COPD, liver cirrhosis, alcohol/drug abuse, hypertension, diabetes, hyperlipidemia, glaucoma   Sister- alcohol/drug abuse, mental illness, psychotic disorder  Brother- colon cancer, alcohol/drug abuse    Social History:  The patient presents with group home caretaker.      Physical Exam     Patient Vitals for the past 24 hrs:   BP Temp Temp src Pulse Resp SpO2 Height Weight   09/26/21 0017 127/68 99.4  F (37.4  C) Oral 77 18 95 % -- --   09/25/21 2324 (!) 143/69 (!) 100.9  F (38.3  C) Oral 75 18 95 % -- --   09/25/21 2200 (!) 150/74 -- -- 73 19 97 % -- --   09/25/21 2100 (!) 147/71 -- -- 76 17 -- -- --   09/25/21 2000 116/58 -- -- 74 25 94 % -- --   09/25/21 1919 -- -- -- 79 28 94 % -- --   09/25/21 1912 121/59 -- -- 79 20 94 % -- --   09/25/21 1847 -- 98.5  F (36.9  C) Temporal 84 28 91 % 1.524 m (5') 95.3 kg (210 lb)     Physical Exam  General: Well-nourished, appears to be uncomfortable when I enter the room  Eyes: PERRL, conjunctivae pink no scleral icterus or conjunctival injection  ENT:  Mildly dry mucus membranes, posterior oropharynx clear  Respiratory:  Normal work of breathing. Speaking in complete sentences. No accessory muscle use.  +Cough  CV: Normal rate, regular pulse  GI:  Abdomen soft and protuberant. No tenderness, guarding or rebound  Skin: Warm, dry.  No rashes or petechiae  Musculoskeletal: No peripheral edema or calf tenderness  Neuro: Alert and oriented to person/place/time.  Neck supple.  Psychiatric: Flat affect. Quiet but answers questions. Moving all extremities    Emergency Department Course   EKG:   ECG taken at 2024, ECG read at 2028  Sinus rhythm with occasional premature ventricular complexes   Possible anterior  infarct, age undetermined  No significant change as compared to previous ECG taken 08/10/2021  Rate 82 bpm. KY interval *. QRS duration 84. QT/QTc 348/406. P-R-T axes * -25 1.    Imaging:  XR Chest Port   Mildly enlarged cardiac silhouette. Area of focally increased density in the left lateral lung zone, new from the comparison exam, suspicious for a small focus of pneumonia. Recommend six-week follow-up PA and lateral chest radiographs to   ensure resolution and exclude other pathology. No heart failure. No pneumothorax.  Reading per radiology.     Laboratory:  CBC: WBC 7.4, HGB 10.1(L),      CMP: CO2 18(L) creatinine 1.71(H) glucose 43(L) albumin 3.1(L) GFR 32(L) o/w WNL    Troponin (Collected 1913): <0.015    Lactic acid (result time 1943) 0.7     Blood gas venous: pH: 7.33, PCO2: 36(L), PO2: 26, Bicarbonate: 19(L), FIO2 0, base excess -6.3     Magnesium: 1.9    Blood Culture x2: Pending    Emergency Department Course:    Reviewed:  I reviewed nursing notes, vitals, past history and care everywhere    Assessments:  1900 I obtained history and examined the patient as noted above.     2032 I returned to check on patient.      Consults:   2016 I spoke with Dr. Prakash of the Hospitalist service from [facility] regarding patient's presentation, findings, and plan of care.    Interventions:  2004 Dextrose 50 mL, IV     Disposition:  The patient was admitted to the hospital under the care of Dr. Prakash.    Impression & Plan    Medical Decision Making:  Nena Tang is a 61 year old female who presents for evaluation of fatigue and cough in the setting of a breakthrough COVID-19 infection.  On arrival, she is saturating well and her work of breathing is reassuring.  Chest x-ray showed possible Covid pneumonitis but mild.  No other abnormalities.  Laboratory studies revealed that she is hypoglycemic.  She also appears dehydrated with acute kidney injury.  The most likely etiology of the  hypoglycemia is diarrhea and the worsening renal function, but nonetheless a broad differential was considered including infection, medication noncompliance, overdose of medication, other metabolic derangement, lack of adequate PO intake, etc.  The patient is on long-acting insulin as well as Trulicity.  She was treated with dextrose and she was able to eat a meal. Workup and detailed history are reassuring but given she is on long-acting medications, has acute kidney injury and has ongoing diarrhea secondary to COVID-19 infection, we will place in observation for continued monitoring and cares.  I spoke with the hospitalist who graciously accepted the patient to their service.    Covid-19  Nena Tang was evaluated during a global COVID-19 pandemic, which necessitated consideration that the patient might be at risk for infection with the SARS-CoV-2 virus that causes COVID-19.   Applicable protocols for evaluation were followed during the patient's care.   COVID-19 was considered as part of the patient's evaluation. The plan for testing is:  a test was obtained at a previous visit and reviewed & considered today.    Diagnosis:    ICD-10-CM    1. Hypoglycemia  E16.2    2. Acute kidney injury (H)  N17.9    3. Diarrhea, unspecified type  R19.7    4. Dehydration  E86.0    5. 2019 novel coronavirus disease (COVID-19)  U07.1        Scribe Disclosure:  I, Eleanor Workman, am serving as a scribe at 6:56 PM on 9/25/2021 to document services personally performed by Nikole Obrien MD based on my observations and the provider's statements to me.      Nikole Obrien MD  09/26/21 0126

## 2021-09-26 LAB
ALBUMIN SERPL-MCNC: 2.7 G/DL (ref 3.4–5)
ALBUMIN UR-MCNC: NEGATIVE MG/DL
ALP SERPL-CCNC: 63 U/L (ref 40–150)
ALT SERPL W P-5'-P-CCNC: 31 U/L (ref 0–50)
ANION GAP SERPL CALCULATED.3IONS-SCNC: 12 MMOL/L (ref 3–14)
APPEARANCE UR: ABNORMAL
AST SERPL W P-5'-P-CCNC: 30 U/L (ref 0–45)
BACTERIA #/AREA URNS HPF: ABNORMAL /HPF
BASOPHILS # BLD AUTO: 0 10E3/UL (ref 0–0.2)
BASOPHILS NFR BLD AUTO: 0 %
BILIRUB SERPL-MCNC: 0.6 MG/DL (ref 0.2–1.3)
BILIRUB UR QL STRIP: NEGATIVE
BUN SERPL-MCNC: 17 MG/DL (ref 7–30)
CALCIUM SERPL-MCNC: 8.2 MG/DL (ref 8.5–10.1)
CHLORIDE BLD-SCNC: 108 MMOL/L (ref 94–109)
CK SERPL-CCNC: 222 U/L (ref 30–225)
CO2 SERPL-SCNC: 16 MMOL/L (ref 20–32)
COLOR UR AUTO: ABNORMAL
CREAT SERPL-MCNC: 1.2 MG/DL (ref 0.52–1.04)
CREAT SERPL-MCNC: 1.36 MG/DL (ref 0.52–1.04)
CRP SERPL-MCNC: 99.5 MG/L (ref 0–8)
D DIMER PPP FEU-MCNC: 1 UG/ML FEU (ref 0–0.5)
EOSINOPHIL # BLD AUTO: 0.4 10E3/UL (ref 0–0.7)
EOSINOPHIL NFR BLD AUTO: 7 %
ERYTHROCYTE [DISTWIDTH] IN BLOOD BY AUTOMATED COUNT: 12.8 % (ref 10–15)
GFR SERPL CREATININE-BSD FRML MDRD: 42 ML/MIN/1.73M2
GFR SERPL CREATININE-BSD FRML MDRD: 49 ML/MIN/1.73M2
GLUCOSE BLD-MCNC: 122 MG/DL (ref 70–99)
GLUCOSE BLDC GLUCOMTR-MCNC: 111 MG/DL (ref 70–99)
GLUCOSE BLDC GLUCOMTR-MCNC: 117 MG/DL (ref 70–99)
GLUCOSE BLDC GLUCOMTR-MCNC: 132 MG/DL (ref 70–99)
GLUCOSE BLDC GLUCOMTR-MCNC: 173 MG/DL (ref 70–99)
GLUCOSE BLDC GLUCOMTR-MCNC: 311 MG/DL (ref 70–99)
GLUCOSE BLDC GLUCOMTR-MCNC: 37 MG/DL (ref 70–99)
GLUCOSE BLDC GLUCOMTR-MCNC: 50 MG/DL (ref 70–99)
GLUCOSE BLDC GLUCOMTR-MCNC: 67 MG/DL (ref 70–99)
GLUCOSE BLDC GLUCOMTR-MCNC: 81 MG/DL (ref 70–99)
GLUCOSE BLDC GLUCOMTR-MCNC: 87 MG/DL (ref 70–99)
GLUCOSE UR STRIP-MCNC: NEGATIVE MG/DL
HCT VFR BLD AUTO: 29 % (ref 35–47)
HGB BLD-MCNC: 9.4 G/DL (ref 11.7–15.7)
HGB UR QL STRIP: ABNORMAL
IMM GRANULOCYTES # BLD: 0 10E3/UL
IMM GRANULOCYTES NFR BLD: 0 %
KETONES UR STRIP-MCNC: NEGATIVE MG/DL
LEUKOCYTE ESTERASE UR QL STRIP: ABNORMAL
LYMPHOCYTES # BLD AUTO: 1.7 10E3/UL (ref 0.8–5.3)
LYMPHOCYTES NFR BLD AUTO: 33 %
MCH RBC QN AUTO: 31.2 PG (ref 26.5–33)
MCHC RBC AUTO-ENTMCNC: 32.4 G/DL (ref 31.5–36.5)
MCV RBC AUTO: 96 FL (ref 78–100)
MONOCYTES # BLD AUTO: 0.3 10E3/UL (ref 0–1.3)
MONOCYTES NFR BLD AUTO: 7 %
MUCOUS THREADS #/AREA URNS LPF: PRESENT /LPF
NEUTROPHILS # BLD AUTO: 2.8 10E3/UL (ref 1.6–8.3)
NEUTROPHILS NFR BLD AUTO: 53 %
NITRATE UR QL: NEGATIVE
NRBC # BLD AUTO: 0 10E3/UL
NRBC BLD AUTO-RTO: 0 /100
PH UR STRIP: 6 [PH] (ref 5–7)
PLATELET # BLD AUTO: 166 10E3/UL (ref 150–450)
POTASSIUM BLD-SCNC: 3.6 MMOL/L (ref 3.4–5.3)
PROT SERPL-MCNC: 7.3 G/DL (ref 6.8–8.8)
RBC # BLD AUTO: 3.01 10E6/UL (ref 3.8–5.2)
RBC URINE: 4 /HPF
SODIUM SERPL-SCNC: 136 MMOL/L (ref 133–144)
SP GR UR STRIP: 1.01 (ref 1–1.03)
SQUAMOUS EPITHELIAL: 1 /HPF
TROPONIN I SERPL-MCNC: <0.015 UG/L (ref 0–0.04)
UROBILINOGEN UR STRIP-MCNC: NORMAL MG/DL
WBC # BLD AUTO: 5.2 10E3/UL (ref 4–11)
WBC URINE: 12 /HPF

## 2021-09-26 PROCEDURE — 81003 URINALYSIS AUTO W/O SCOPE: CPT | Performed by: HOSPITALIST

## 2021-09-26 PROCEDURE — 86769 SARS-COV-2 COVID-19 ANTIBODY: CPT | Performed by: INTERNAL MEDICINE

## 2021-09-26 PROCEDURE — 250N000013 HC RX MED GY IP 250 OP 250 PS 637: Performed by: INTERNAL MEDICINE

## 2021-09-26 PROCEDURE — 99233 SBSQ HOSP IP/OBS HIGH 50: CPT | Performed by: INTERNAL MEDICINE

## 2021-09-26 PROCEDURE — 250N000011 HC RX IP 250 OP 636: Performed by: HOSPITALIST

## 2021-09-26 PROCEDURE — 120N000001 HC R&B MED SURG/OB

## 2021-09-26 PROCEDURE — 250N000011 HC RX IP 250 OP 636: Performed by: INTERNAL MEDICINE

## 2021-09-26 PROCEDURE — 36415 COLL VENOUS BLD VENIPUNCTURE: CPT | Performed by: INTERNAL MEDICINE

## 2021-09-26 PROCEDURE — 86140 C-REACTIVE PROTEIN: CPT | Performed by: HOSPITALIST

## 2021-09-26 PROCEDURE — 82565 ASSAY OF CREATININE: CPT | Performed by: HOSPITALIST

## 2021-09-26 PROCEDURE — 250N000012 HC RX MED GY IP 250 OP 636 PS 637: Performed by: INTERNAL MEDICINE

## 2021-09-26 PROCEDURE — 36415 COLL VENOUS BLD VENIPUNCTURE: CPT | Performed by: HOSPITALIST

## 2021-09-26 PROCEDURE — 250N000012 HC RX MED GY IP 250 OP 636 PS 637: Performed by: HOSPITALIST

## 2021-09-26 PROCEDURE — 82040 ASSAY OF SERUM ALBUMIN: CPT | Performed by: HOSPITALIST

## 2021-09-26 PROCEDURE — 85025 COMPLETE CBC W/AUTO DIFF WBC: CPT | Performed by: HOSPITALIST

## 2021-09-26 PROCEDURE — 250N000013 HC RX MED GY IP 250 OP 250 PS 637: Performed by: HOSPITALIST

## 2021-09-26 PROCEDURE — 84484 ASSAY OF TROPONIN QUANT: CPT | Performed by: HOSPITALIST

## 2021-09-26 PROCEDURE — 87086 URINE CULTURE/COLONY COUNT: CPT | Performed by: HOSPITALIST

## 2021-09-26 PROCEDURE — 85379 FIBRIN DEGRADATION QUANT: CPT | Performed by: HOSPITALIST

## 2021-09-26 PROCEDURE — 82550 ASSAY OF CK (CPK): CPT | Performed by: HOSPITALIST

## 2021-09-26 RX ORDER — GUAIFENESIN/DEXTROMETHORPHAN 100-10MG/5
10 SYRUP ORAL EVERY 4 HOURS PRN
Status: DISCONTINUED | OUTPATIENT
Start: 2021-09-26 | End: 2021-09-29 | Stop reason: HOSPADM

## 2021-09-26 RX ORDER — ESCITALOPRAM OXALATE 10 MG/1
10 TABLET ORAL DAILY
Status: DISCONTINUED | OUTPATIENT
Start: 2021-09-27 | End: 2021-09-29 | Stop reason: HOSPADM

## 2021-09-26 RX ORDER — ALBUTEROL SULFATE 90 UG/1
2 AEROSOL, METERED RESPIRATORY (INHALATION) 4 TIMES DAILY PRN
Status: DISCONTINUED | OUTPATIENT
Start: 2021-09-26 | End: 2021-09-29 | Stop reason: HOSPADM

## 2021-09-26 RX ORDER — ALBUTEROL SULFATE 90 UG/1
2 AEROSOL, METERED RESPIRATORY (INHALATION) 4 TIMES DAILY
Status: DISCONTINUED | OUTPATIENT
Start: 2021-09-26 | End: 2021-09-29 | Stop reason: HOSPADM

## 2021-09-26 RX ADMIN — ALBUTEROL SULFATE 2 PUFF: 90 AEROSOL, METERED RESPIRATORY (INHALATION) at 21:39

## 2021-09-26 RX ADMIN — METOPROLOL SUCCINATE 50 MG: 50 TABLET, EXTENDED RELEASE ORAL at 09:07

## 2021-09-26 RX ADMIN — ATORVASTATIN CALCIUM 80 MG: 80 TABLET, FILM COATED ORAL at 09:08

## 2021-09-26 RX ADMIN — ESCITALOPRAM OXALATE 10 MG: 10 TABLET ORAL at 09:07

## 2021-09-26 RX ADMIN — TOPIRAMATE 200 MG: 50 TABLET ORAL at 09:07

## 2021-09-26 RX ADMIN — AMANTADINE HYDROCHLORIDE 100 MG: 100 CAPSULE ORAL at 09:08

## 2021-09-26 RX ADMIN — ENOXAPARIN SODIUM 40 MG: 40 INJECTION SUBCUTANEOUS at 21:38

## 2021-09-26 RX ADMIN — CLONAZEPAM 0.5 MG: 0.5 TABLET ORAL at 21:39

## 2021-09-26 RX ADMIN — FAMOTIDINE 20 MG: 20 TABLET ORAL at 09:07

## 2021-09-26 RX ADMIN — ASPIRIN 81 MG: 81 TABLET, DELAYED RELEASE ORAL at 09:07

## 2021-09-26 RX ADMIN — DEXAMETHASONE 6 MG: 2 TABLET ORAL at 10:25

## 2021-09-26 RX ADMIN — ALBUTEROL SULFATE 2 PUFF: 90 AEROSOL, METERED RESPIRATORY (INHALATION) at 14:31

## 2021-09-26 RX ADMIN — GABAPENTIN 300 MG: 300 CAPSULE ORAL at 21:39

## 2021-09-26 RX ADMIN — ENOXAPARIN SODIUM 40 MG: 40 INJECTION SUBCUTANEOUS at 09:08

## 2021-09-26 RX ADMIN — CLONAZEPAM 0.5 MG: 0.5 TABLET ORAL at 00:31

## 2021-09-26 RX ADMIN — DEXTROSE 15 G: 15 GEL ORAL at 06:32

## 2021-09-26 RX ADMIN — INSULIN ASPART 1 UNITS: 100 INJECTION, SOLUTION INTRAVENOUS; SUBCUTANEOUS at 17:51

## 2021-09-26 RX ADMIN — PANTOPRAZOLE SODIUM 40 MG: 40 TABLET, DELAYED RELEASE ORAL at 09:07

## 2021-09-26 RX ADMIN — ACETAMINOPHEN 650 MG: 325 TABLET, FILM COATED ORAL at 19:52

## 2021-09-26 RX ADMIN — ALBUTEROL SULFATE 2 PUFF: 90 AEROSOL, METERED RESPIRATORY (INHALATION) at 17:51

## 2021-09-26 ASSESSMENT — ACTIVITIES OF DAILY LIVING (ADL)
ADLS_ACUITY_SCORE: 22
ADLS_ACUITY_SCORE: 14
ADLS_ACUITY_SCORE: 22

## 2021-09-26 NOTE — PROGRESS NOTES
Attempted patient off O2 but because of her lethargy and HX CINDY she desats often. Maintained O2 92% or higher on 2L. Patient also SOB with any activity. Will obtain cpap set up as soon as we can locate Hepa Filter, none available at present time.

## 2021-09-26 NOTE — PROGRESS NOTES
[facility]    Hospitalist Progress Note    Date of Service (when I saw the patient): 09/26/2021    Assessment & Plan   Nena Tang is a 61 year old female who was admitted on 9/25/2021.  Nena Tang is a 61 year old  female with medical history of CAD, hypertension, hyperlipidemia, irritable bowel syndrome, depression, schizoaffective disorder, history of cocaine, heroin use in remission, CINDY on CPAP, diabetes mellitus presented to the ED with cough and fatigue and poor oral intake in the setting of Covid infection.     COVID-19 infection.  Report of ongoing cough for 6 days. On 9/22 tested for Covid, positive test results noted on 9/24.  Associated fatigue, diarrhea, poor oral intake. No Shortness of breath, not requiring supplemental nasal oxygen. Fully vaccinated with 2 doses of a Moderna Covid vaccine.   In ED patient afebrile, oxygen saturation fluctuating between 91 to 94% on room air.  Drowsy but arousable.  Bilateral decreased breath sounds not using accessory muscles of respiration. Able to speak in full sentences and falls asleep.  WBC 7.4, hemoglobin 10.1.  Glucose of 177. Troponin undetectable, lactic acid 0.7.  EKG no acute ST-T wave changes.  Admitted  to inpatient.  Continuous pulse oximetry.  Telemetry monitoring overnight.  Chest x-ray done on admission showed mildly enlarged cardiac she will help.  Area of focal increased density in the left lateral lung zone new from the comparison exam suspicious for small focus of pneumonia  ID consulted for possible monoclonal antibodies no need for monoclonal antibodies currently.  ID recommended not to treat her for COVID  unless patient is hypoxic.  Patient is sleeping currently having episodes of apnea and hypoxia as per the nursing staff with 2 L of oxygen her oxygenation is stable  Patient is short of breath with minimal activity from moving to the commode from bed  Coughing intermittently Robitussin-DM ordered to  help with the cough  Albuterol inhaler 2 puff QID ordered   Encourage incentive spirometry and Acapella once patient is more awake and alert  Subcutaneous Lovenox for Pharmacological DVT prophylaxis.    Abnormal UA:  Urine culture pending   Hold off on antibiotics for now      Acute kidney injury likely prerenal from poor oral intake,diarrhea, lisinopril use.  Baseline creatinine around 0.8-0.9.  Bicarb 18, creatinine 1.71, albumin 3.1.  Magnesium 1.9.  IV hydration with normal saline with KCl at 100 mL/h overnight.  Would be cautious use of IV fluids and Covid patient.  Monitor electrolytes, renal function in a.m.  UA ordered   Hold PTA losartan in the setting of kidney injury.  Cr improved to 1.2 today. D/ban IVF today   Avoid nephrotoxic drugs, monitor renal function in a.m.     Diarrhea likely from COVID-19.  Recent admission 8/2021 for diarrhea, uncontrolled diabetes at [facility], then enteric panel and C. difficile negative.  Monitor stool output.  I's and O's.     Hypoglycemia likely from poor oral intake, ? Malfunctioning blood glucose monitor  Diabetes mellitus insulin-dependent with a hemoglobin A1c of 10.  In ED blood glucose 43, received IV dextrose infusion and blood glucose improving to 146.  Check hemoglobin A1c.  Hold PTA insulin aspart and Lantus given severe hypoglycemia and poor oral intake  insulin sliding scale medium intensity overnight.  Regular diet.  hypoglycemia protocol ordered.   Group home staff concerned that patient's blood glucose monitored not working properly, will need to be calibrated prior to discharge with hospital monitor.  Blood sugars are still well controlled today     Coronary artery disease.  Hypertension.  Hyperlipidemia.  Patient denies any chest pain as above EKG sinus rhythm with occasional PVCs.  Troponin undetectable.  Telemetry monitoring overnight.  Restarted PTA aspirin, atorvastatin   Restarted PTA metoprolol   hold PTA losartan in the setting of  kidney injury.     Depression  Shizoaffective disorder  Fibromyalgia  Borderline intellectual functioning  History of cocaine, heroin use in remission  Group home resident.  Per discussion with group home staff no acute issues.  Restart PTA Lexapro, Klonopin, gabapentin, Vistaril, amantadine      Physical deconditioning in the setting of COVID-19, chronic medical illness  PT OT evaluation for discharge dispo.   evaluation requested.     Obstructive sleep apnea.  Hold off PTA CPAP given COVID-19.  Monitor respiratory status.     Medically complicated obesity with a BMI 41.01.  Consider lifestyle modification with diet and exercise as able to.     DVT Prophylaxis: SCDs.  Subcutaneous Lovenox  Code Status: Full code.     Disposition: Expected discharge in 2 days     Discussed with bedside RN, patient and ID team today         Belinda Monroy MD  [phone] (P)      Interval History   Pt is resting in bed. Somnolent this AM. desats with sleeping with CINDY. SOB with minimal activity. No acute events since yesterday. Cr improving     -Data reviewed today: I reviewed all new labs and imaging results over the last 24 hours. I personally reviewed no images or EKG's today.    Physical Exam   Temp: 98.3  F (36.8  C) Temp src: Oral BP: (!) 144/75 Pulse: 86   Resp: 18 SpO2: 96 % O2 Device: Nasal cannula Oxygen Delivery: 2 LPM  Vitals:    09/25/21 1847   Weight: 95.3 kg (210 lb)     Vital Signs with Ranges  Temp:  [98.3  F (36.8  C)-100.9  F (38.3  C)] 98.3  F (36.8  C)  Pulse:  [73-86] 86  Resp:  [17-28] 18  BP: (116-150)/(58-75) 144/75  SpO2:  [81 %-99 %] 96 %  I/O last 3 completed shifts:  In: -   Out: 600 [Urine:600]    Constitutional: Awake, alert, cooperative, no apparent distress, coughing  Respiratory: diminished air entry b/l. No wheezing   Cardiovascular: Regular rate and rhythm, normal S1 and S2, and no murmur noted  GI: Normal bowel sounds, soft, non-distended, non-tender  Skin/Integumen: No rashes, no  cyanosis, no edema  Other:     Medications       amantadine  100 mg Oral Daily     aspirin  81 mg Oral Daily     atorvastatin  80 mg Oral Daily     clonazePAM  0.5 mg Oral At Bedtime     enoxaparin ANTICOAGULANT  40 mg Subcutaneous Q12H     escitalopram  10 mg Oral Daily     famotidine  20 mg Oral Daily     gabapentin  300 mg Oral At Bedtime     insulin aspart  1-7 Units Subcutaneous TID AC     insulin aspart  1-5 Units Subcutaneous At Bedtime     metoprolol succinate ER  50 mg Oral Daily     pantoprazole  40 mg Oral Daily     sodium chloride (PF)  3 mL Intracatheter Q8H     topiramate  200 mg Oral Daily       Data   Recent Labs   Lab 09/26/21  0904 09/26/21  0803 09/26/21  0710 09/26/21  0148 09/26/21  0114 09/25/21 2035 09/25/21 1913   WBC  --  5.2  --   --   --   --  7.4   HGB  --  9.4*  --   --   --   --  10.1*   MCV  --  96  --   --   --   --  97   PLT  --  166  --   --   --   --  177   NA  --  136  --   --   --   --  134   POTASSIUM  --  3.6  --   --   --   --  3.8   CHLORIDE  --  108  --   --   --   --  104   CO2  --  16*  --   --   --   --  18*   BUN  --  17  --   --   --   --  22   CR  --  1.20*  --   --  1.36*  --  1.71*   ANIONGAP  --  12  --   --   --   --  12   ALLEN  --  8.2*  --   --   --   --  8.5   * 122* 87   < >  --    < > 43*   ALBUMIN  --  2.7*  --   --   --   --  3.1*   PROTTOTAL  --  7.3  --   --   --   --  8.0   BILITOTAL  --  0.6  --   --   --   --  0.6   ALKPHOS  --  63  --   --   --   --  68   ALT  --  31  --   --   --   --  31   AST  --  30  --   --   --   --  29   TROPONIN  --  <0.015  --   --   --   --  <0.015    < > = values in this interval not displayed.       Recent Results (from the past 24 hour(s))   XR Chest Port 1 View    Narrative    EXAM: XR CHEST PORT 1 VIEW  LOCATION: [facility]  DATE/TIME: 9/25/2021 7:39 PM    INDICATION: Dyspnea/SOB  COMPARISON: 07/15/2021      Impression    IMPRESSION: Mildly enlarged cardiac silhouette. Area of focally  increased density in the left lateral lung zone, new from the comparison exam, suspicious for a small focus of pneumonia. Recommend six-week follow-up PA and lateral chest radiographs to   ensure resolution and exclude other pathology. No heart failure. No pneumothorax.

## 2021-09-26 NOTE — H&P
[facility]    History and Physical  Hospitalist    Nena Tang MRN# [id number]   Age: 61 year old YOB: 1960     Date of Admission:  9/25/2021    Primary care provider: Rowena Haas          Assessment and Plan:     Nena Tang is a 61 year old  female with medical history of CAD, hypertension, hyperlipidemia, irritable bowel syndrome, depression, schizoaffective disorder, history of cocaine, heroin use in remission, CINDY on CPAP, diabetes mellitus presented to the ED with cough and fatigue and poor oral intake in the setting of Covid infection.    COVID-19 infection.  Report of ongoing cough for 6 days. On 9/22 tested for Covid, positive test results noted on 9/24.  Associated fatigue, diarrhea, poor oral intake. No Shortness of breath, not requiring supplemental nasal oxygen. Fully vaccinated with 2 doses of a Moderna Covid vaccine.   In ED patient afebrile, oxygen saturation fluctuating between 91 to 94% on room air.  Drowsy but arousable.  Bilateral decreased breath sounds not using accessory muscles of respiration. Able to speak in full sentences and falls asleep.  WBC 7.4, hemoglobin 10.1.  Glucose of 177. Troponin undetectable, lactic acid 0.7.  EKG no acute ST-T wave changes.  Admit to inpatient.  Continuous pulse oximetry.  Telemetry monitoring overnight.  Follow pending chest x-ray. No steroids or remdesivir at this time as patient is not hypoxic or requiring oxygen.  Can consider about  same if any infiltrate noted on imaging.  Has not received monoclonal antibodies PTA, staff would like her to get it if she qualifies.  Infectious disease consult requested for monoclonal antibodies.  Subcutaneous Lovenox for Pharmacological DVT prophylaxis.  Follow pending blood cultures,WBC Count, fever curve  Incentive spirometry.  Special precautions.  Follow CRP, CK, D-dimer    Acute kidney injury likely prerenal from poor oral intake,diarrhea, lisinopril  use.  Baseline creatinine around 0.8-0.9.  Bicarb 18, creatinine 1.71, albumin 3.1.  Magnesium 1.9.  IV hydration with normal saline with KCl at 100 mL/h overnight.  Would be cautious use of IV fluids and Covid patient.  Monitor electrolytes, renal function in a.m.  UA ordered   Hold PTA losartan in the setting of kidney injury.  Avoid nephrotoxic drugs, monitor renal function in a.m.    Diarrhea likely from COVID-19.  Recent admission 8/2021 for diarrhea, uncontrolled diabetes at [facility], then enteric panel and C. difficile negative.  Monitor stool output.  I's and O's.    Hypoglycemia likely from poor oral intake, ? Malfunctioning blood glucose monitor  Diabetes mellitus insulin-dependent with a hemoglobin A1c of 10.  In ED blood glucose 43, received IV dextrose infusion and blood glucose improving to 146.  Check hemoglobin A1c.  Hold PTA insulin aspart and Lantus given severe hypoglycemia and poor oral intake  insulin sliding scale medium intensity overnight.  Regular diet.  hypoglycemia protocol ordered.   Group home staff concerned that patient's blood glucose monitored not working properly, will need to be calibrated prior to discharge with hospital monitor.    Coronary artery disease.  Hypertension.  Hyperlipidemia.  Patient denies any chest pain as above EKG sinus rhythm with occasional PVCs.  Troponin undetectable.  Telemetry monitoring overnight.  Restart PTA aspirin, atorvastatin pending medication reconciliation by pharmacy.  Restart PTA metoprolol pending medication reconciliation by pharmacy  hold PTA losartan in the setting of kidney injury.    Depression  Shizoaffective disorder  Fibromyalgia  Borderline intellectual functioning  History of cocaine, heroin use in remission  Group home resident.  Per discussion with group home staff no acute issues.  Restart PTA Lexapro, Klonopin, gabapentin, Vistaril, amantadine pending medication reconciliation by pharmacy.    Physical  deconditioning in the setting of COVID-19, chronic medical illness  PT OT evaluation for discharge dispo.   evaluation requested.    Obstructive sleep apnea.  Hold off PTA CPAP given COVID-19.  Monitor respiratory status.    Medically complicated obesity with a BMI 41.01.  Consider lifestyle modification with diet and exercise as able to.    DVT Prophylaxis: SCDs.  Subcutaneous Lovenox  Code Status: Full code.    Disposition: Expected discharge in 2 days pending clinical improvement.  More than 60% of time spent in direct patient care, care coordination, patient/caregiver counseling, and formalizing plan of care.  Discussed with patient's group home staff by the bedside, ED physician      Chandler Prakash MD          Chief Complaint:     History obtained from patient's group home staff by the bedside, medical records.  Patient with psychiatric illness, drowsy and unable to provide any history.    Nena Tang is a 61 year old  female with medical history of CAD, hypertension, hyperlipidemia, depression, schizoaffective disorder, history of cocaine, heroin use in remission, CINDY on CPAP, diabetes mellitus presented to the ED with cough and fatigue and poor oral intake in the setting of Covid infection.    Patient fully vaccinated with  2 doses of a Moderna Covid vaccine, and ongoing cough for 6 days prior.  On 9/22 tested for Covid, positive test results noted on 9/24.  Reports ongoing fatigue, cough, nausea.  Associated poor oral intake.  Also report of diarrhea, loose watery stools.  No blood.  Not requiring any supplemental nasal oxygen.    No shortness of breath.  No fevers.  Has not received monoclonal antibodies yet, staff would like her to get it if she qualifies.  Patient drowsy but arousable.  Confused per group home staff.  Per report patient has a blood sugar monitoring device, runs high and staff at group home had a check in the clinic this week and they think it is not properly  working.    In ED patient afebrile, oxygen saturation fluctuating between 91 to 94% on room air.  Drowsy but arousable.  Bilateral decreased breath sounds not using accessory muscles of respiration. Able to speak in full sentences and falls asleep.  Blood glucose 43.  WBC 7.4, hemoglobin 10.1.  Glucose of 177.  Bicarb 18, creatinine 1.71, albumin 3.1.  Magnesium 1.9.  Troponin undetectable, lactic acid 0.7.  Blood cultures x2 pending.  EKG sinus rhythm with occasional PVCs.  .    In ED he received IV fluids, IV dextrose.            Review of Systems:     GENERAL: no fever   EENT:  no difficulty swallowing  PULMONARY: No shortness of breath  CARDIAC: no chest pain  GI:  diarrhea  : no hematuria.   NEURO: no seizures,  no loss of consciousness  ENDOCRINE:  no excessive bruising.  MUSCULOSKELETAL: No joint  swelling.  SKIN: No skin rashes  PSYCHIATRY drowsy     Medical History:     Past Medical History:   Diagnosis Date     Acute respiratory failure with hypoxia (H) 9/4/2017     CAD (coronary artery disease)     5/2014 cath, nonbostructive stenosis to LAD, RCA.     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/2011     Noncompliance with medication regimen 6/8/2011     Obesity      CINDY (obstructive sleep apnea) 3/8/2012    uses CPAP     Osteopenia 10/7/2009     Pneumonia of right lower lobe due to infectious organism 9/4/2017     Schizoaffective disorder, depressive type (H) 2/25/2013     Sepsis (H) 8/29/2017     Suicidal intent 10/2/2013     Takotsubo cardiomyopathy      Type 2 diabetes mellitus (H) 8/30/2011     Uterine cancer (H) 1983     Verbal auditory hallucination 10/4/2012         Surgical History:      Past Surgical History:   Procedure Laterality Date     C OOPHORECTORMY FOR MALALEJO, W/BX  1983    UTERINE     CATARACT IOL, RT/LT Bilateral 2017     CHOLECYSTECTOMY       COLONOSCOPY N/A 3/16/2017    Procedure: COLONOSCOPY;  Surgeon: Traci Gonzalez MD;  Location:  GI     Coronary CTA  5/21/2014     HYSTERECTOMY  1983    uterine cancer yearly pap's per provider.     HYSTERECTOMY       LAPAROSCOPIC CHOLECYSTECTOMY  2008     PHACOEMULSIFICATION CLEAR CORNEA WITH STANDARD INTRAOCULAR LENS IMPLANT Left 5/5/2017    Procedure: PHACOEMULSIFICATION CLEAR CORNEA WITH STANDARD INTRAOCULAR LENS IMPLANT;  LEFT EYE PHACOEMULSIFICATION CLEAR CORNEA WITH STANDARD INTRAOCULAR LENS IMPLANT ;  Surgeon: Tyra Diaz MD;  Location:  [facility]     PHACOEMULSIFICATION CLEAR CORNEA WITH STANDARD INTRAOCULAR LENS IMPLANT Right 6/30/2017    Procedure: PHACOEMULSIFICATION CLEAR CORNEA WITH STANDARD INTRAOCULAR LENS IMPLANT;  RIGHT EYE PHACOEMULSIFICATION CLEAR CORNEA WITH STANDARD INTRAOCULAR LENS IMPLANT;  Surgeon: Tyra Diaz MD;  Location:  [facility]     RELEASE TRIGGER FINGER  10/11/2012    Left thumb. Procedure: RELEASE TRIGGER FINGER;  LEFT THUMB TRIGGER RELEASE;  Surgeon: Tay Langley MD;  Location:  [facility]     RELEASE TRIGGER FINGER Right 12/26/2016    Procedure: RELEASE TRIGGER FINGER;  Surgeon: Albino Castañeda MD;  Location:  OR             Social History:      Social History     Tobacco Use     Smoking status: Never Smoker     Smokeless tobacco: Never Used   Substance Use Topics     Alcohol use: No     Comment: last month             Family History:     Family History   Problem Relation Age of Onset     Cancer Mother         BLADDER     Respiratory Mother         COPD     Gastrointestinal Disease Mother         CIRRHOSIS OF LI BOLIVAR     Alcohol/Drug Mother      Diabetes Mother      Hypertension Mother      Lipids Mother      C.A.D. Mother      Glaucoma Mother      Alcohol/Drug  Sister      Mental Illness Sister      Alcohol/Drug Sister      Psychotic Disorder Sister      Cancer Maternal Grandmother         UNKNOWN TYPE     Cancer Brother         COLON     Cancer - colorectal Brother         IN HIS LATE 30S     Alcohol/Drug Brother          OF HEROIN OVERDOSE AT AGE 22 YRS     Macular Degeneration No family hx of              Allergies:     Allergies   Allergen Reactions     Imidazole Antifungals Hives     Tolerates diflucan     Ketoprofen Itching     Pruritis to topical     Lisinopril Hives     Metformin Other (See Comments)     Patient hospitalized for lactic acidosis - admitting provider suspectd caused by metformin     Metronidazole Hives     Posaconazole Hives     Tolerates diflucan             Medications:   HOme meds reviewed          Physical Exam      Admission Weight: 95.3 kg (210 lb)  Current Weight: 95.3 kg (210 lb)    Vital Signs with Ranges  Temp:  [98.5  F (36.9  C)] 98.5  F (36.9  C)  Pulse:  [79-84] 79  Resp:  [20-28] 28  BP: (121)/(59) 121/59  SpO2:  [91 %-94 %] 94 %    PHYSICAL EXAM  GENERAL: Patient is in no distress.  Drowsy but arousable.  HEENT: Oropharynx dry. Pupils equal.  Extraocular muscle movements intact.  HEART: Regular rate and rhythm. S1S2.   LUNGS: Bilateral decreased breath sounds, no wheezing or crackles.  Respirations unlabored  ABDOMEN: Soft, no abdominal tenderness, bowel sounds heard   no guarding or rigidity.  NEURO: Moving all extremities.  EXTREMITIES: No pedal edema  SKIN: Warm, dry. No rash or bruising.  PSYCHIATRY Cooperative         Data:   All new lab and imaging data was reviewed.

## 2021-09-26 NOTE — PROGRESS NOTES
Sent message to Dr Monroy to inform her that patient had episode of what looked like v fib when she was up to commode, back in bed she has remained NSR.

## 2021-09-26 NOTE — PROGRESS NOTES
RECEIVING UNIT ED HANDOFF REVIEW    ED Nurse Handoff Report was reviewed by: Les Burnett RN on September 25, 2021 at 10:32 PM

## 2021-09-26 NOTE — ED NOTES
[facility]  ED Nurse Handoff Report    ED Chief complaint: Shortness of Breath      ED Diagnosis:   Final diagnoses:   Hypoglycemia   Acute kidney injury (H)   Diarrhea, unspecified type   Dehydration   2019 novel coronavirus disease (COVID-19)       Code Status: Full Code    Allergies:   Allergies   Allergen Reactions     Imidazole Antifungals Hives     Tolerates diflucan     Ketoprofen Itching     Pruritis to topical     Lisinopril Hives     Metformin Other (See Comments)     Patient hospitalized for lactic acidosis - admitting provider suspectd caused by metformin     Metronidazole Hives     Posaconazole Hives     Tolerates diflucan       Patient Story: female with a history of CAD, hypertension, and diabetes who presents with concern for cough and fatigue with a known diagnosis of COVID-19 infection.  She is fully vaccinated with the Moderna COVID-19 mRNA vaccination.  Her  states that she always has a cough and this is chronic for her but it seemed like she was getting a worsening cough on Monday, 6 days prior.  On Wednesday, 9/22 they were able to get an appointment for COVID-19 testing.  They received the positive results on Friday, 9/24, yesterday.  He states that she was doing well other than a mild cough but then over the past day or so she has had diarrhea and she has been incredibly fatigued, not wanting to get up or do anything.  She does have a history of diabetes and she has a monitoring device but it runs high and they actually had it checked in the clinic this week and they think it is not working appropriately.  Her fingerstick glucose checks have apparently been normal.  She has not received monoclonal antibodies but I discussed with her nurse Edinson who states that this is something they could get her to if she qualifies and we are able to arrange it as an outpatient.  The states she is not on home oxygen.  No significant fevers.  No other complaints at this  time.  Focused Assessment:  Resp: cough, satting fine Cardiac: WNL Neuro: Sleepy but arousable confused per group home staff.     Treatments and/or interventions provided: Glucose low amp of d50 given  Patient's response to treatments and/or interventions: monitoring    To be done/followed up on inpatient unit:      Does this patient have any cognitive concerns?: Confused    Activity level - Baseline/Home:  Independent  Activity Level - Current:   Stand with Assist    Patient's Preferred language: English   Needed?: No    Isolation: COVID r/o and special precautions  Infection: COVID r/o and special precautions  Patient tested for COVID 19 prior to admission: YES  Bariatric?: No    Vital Signs:   Vitals:    09/25/21 1847 09/25/21 1912 09/25/21 1919   BP:  121/59    Pulse: 84 79 79   Resp: 28 20 28   Temp: 98.5  F (36.9  C)     TempSrc: Temporal     SpO2: 91% 94% 94%   Weight: 95.3 kg (210 lb)     Height: 1.524 m (5')         Cardiac Rhythm:     Was the PSS-3 completed:   Yes  What interventions are required if any?               Family Comments:   OBS brochure/video discussed/provided to patient/family: N/A              Name of person given brochure if not patient:               Relationship to patient:     For the majority of the shift this patient's behavior was Green.   Behavioral interventions performed were .    ED NURSE PHONE NUMBER:

## 2021-09-26 NOTE — PLAN OF CARE
Reason for Admission: dehydation/ Positive Covid    Cognitive/Mentation: A/Ox 4/ lethargic most of shift/awake for short periods  Neuros/CMS: Intact   VS: stable. Tele: NSR.  GI: BS positive,  flatus, last BM 9/26. INContinent/ loose stool.  : Continent.  Pulmonary: LS diminished/ SOB with activity/ Hx sleep apnea/ will need hospital cpap at HS.  Pain: denies      Skin: intact  Activity: Assist x 1 with GB.  Diet: regular with thin liquids. Takes pills whole with water.   Discharge: pending improvement/ Back to group home    End of shift summary: patient on cont pulse ox/ on O2 2L for sleep apnea, sleeping most of the day.     3-7p Patient a little more awake, able to walk to bathroom with 1 assist GB. Tolerating reg diet and eating fair amounts each meal. Blood sugars improving, last checked 173/ 1u coverage

## 2021-09-26 NOTE — CONSULTS
Consult Date: 09/26/2021    INFECTIOUS DISEASE CONSULTATION    LOCATION:  Room 701.    REFERRING PHYSICIAN:  Dr. Prakash    IMPRESSION:     1.  A 61-year-old female with acute diarrhea, renal insufficiency, electrolyte abnormalities and fever, all likely due to COVID-19.  2.  COVID-19 positive, probably cause of the current symptoms.  No major respiratory symptoms. Previously vaccinated.  3.  Chronic cough.  No significant infiltrates or hypoxia.  4.  Acute renal insufficiency, probably dehydration.  5.  Diabetes mellitus with poor control.  6.  Coronary artery disease.  7.  Schizoaffective disorder.  8.  Prior chronic drug use, but in remission.  9.  Obstructive sleep apnea.  10.  POSACONAZOLE ALLERGY.  Unusual allergy.  Cannot find anything in the records or Care Everywhere regarding prior Aspergillus or such infections and the patient not aware.  11.  Recurrent urinary tract infections.    RECOMMENDATIONS:    1.  Symptomatic treatment as we are doing.  2.  From a monoclonal antibody standpoint, relatively minimal immunosuppression. Already vaccinated and thus likely antibodies, and now inpatient where such antibodies are only emergency use authorized products.  While theoretically a candidate, would hold off for now.  Not part of the protocol, but get antibody testing currently. If she has antibodies, very unlikely monoclonal antibodies of any use or value.    HISTORY OF PRESENT ILLNESS:  This 61-year-old female is seen in consultation due to COVID-19 and possible antibody use.  The patient has a history of being vaccinated.  She lives in a group home. Had onset of diarrhea, lethargy and GI symptoms over the last several days and has now been admitted and found to have some renal insufficiency and electrolyte abnormalities.  She is COVID-19 positive.  She was previously vaccinated.  Is having no respiratory symptoms currently.  She has a chronic cough that has been stable.    PAST MEDICAL HISTORY:  The listed  POSACONAZOLE ALLERGY implies some prior fungal infection. Cannot find any evidence of that in the records or Care Everywhere.  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    ALLERGIES:  POSACONAZOLE.    MEDICATIONS:  As listed.    SOCIAL AND FAMILY HISTORY:  Lives in a group home, thus potential exposure.    REVIEW OF SYSTEMS:  Chronic cough, but no different than usual.  Not short of breath.  Did not cough at all while I was in the room.    PHYSICAL EXAMINATION:  VITAL SIGNS:  She was febrile earlier to 101, down now.  HEENT:  No thrush or intraoral lesions.  Pupils reactive.  NECK:  Supple and nontender.  HEART:  Regular rhythm.  No significant murmur.  LUNGS:  Crackles at right base.  ABDOMEN:  Maybe slight tenderness.  EXTREMITIES:  No rash or skin lesions.    LABORATORY DATA:  COVID-19 positive outside. Creatinine up to 1.76, now dropping.    Thank you very much for the consultation.  I will follow the patient with you.    Dalton Deng MD        D: 2021   T: 2021   MT: PROSPER    Name:     ERIK BURNHAM YORDY  MRN:      [id number]        Account:      [id number]   :      1960           Consult Date: 2021     Document: [id number]

## 2021-09-26 NOTE — CONSULTS
ID consult dictated IMP 1 62 yo female GI sxs and electrolyte abnormalities, COVID + , vaccinated    REC No COVID tx, theoretical candidate Ab tx BUT not that immunosuppressed, vaccinated, not for now, check Ab , if already presnt even more likely n o logic Ab tx, takes emergency use auth to do in hospital off label,

## 2021-09-26 NOTE — PLAN OF CARE
Pt came from ED yesterday at 2325. Pt came in w/ Covid 19 positive.Lethargic/ oriented x4. VSS except for max temp 100.9 . Tele SR w/BBB. Reg diet with thin liquids. BG check w/ meals.Hypoglycemic w/ asymptomatic. Takes pills whole. Up with 1/GB pivot to BSC. Loose BM x 2.UA needs to be collected. Lower back/ BLE pain rated 8/10 and managed w/ PRN tylenol. Pt scoring green on the Aggression Stop Light Tool.  Discharge pending.

## 2021-09-26 NOTE — PROGRESS NOTES
Called by the RN that patient had an episode of polymorphic V. tach on telemetry while she was moved to the commode and was sitting on the commode and her legs were shaking.  Reviewed the telemetry strips with cardiologist and they thought the telemetry strip rhythm is most consistent with artifact from her shaking so no further interventions recommended.  Continue to monitor on telemetry.  She is currently in normal sinus rhythm    Belinda Monroy MD

## 2021-09-26 NOTE — PHARMACY-ADMISSION MEDICATION HISTORY
Pharmacy Medication History  Admission medication history interview status for the 9/25/2021  admission is complete. See EPIC admission navigator for prior to admission medications     Location of Interview: N/A  Medication history sources: MAR (All group homes)    Significant changes made to the medication list:      In the past week, patient estimated taking medication this percent of the time: greater than 90%    Additional medication history information:       Medication reconciliation completed by provider prior to medication history? No    Time spent in this activity: 15min    Prior to Admission medications    Medication Sig Last Dose Taking? Auth Provider   acetaminophen (TYLENOL) 650 MG CR tablet Take 1 tablet (650 mg) by mouth every 8 hours as needed for mild pain or fever  Patient taking differently: Take 650 mg by mouth daily  9/24/2021 at 2100 Yes Rowena Haas APRN CNP   amantadine (SYMMETREL) 100 MG capsule Take 200 mg by mouth daily 2 capsules in the evening (1 in the am) 9/24/2021 at Unknown time Yes Unknown, Entered By History   amantadine (SYMMETREL) 100 MG capsule Take 1 capsule (100 mg)  in the morning and 2 capsules (200 mg) in the evening.  Patient taking differently: Take 100 mg by mouth daily Take 1 capsule (100 mg)  in the morning and 2 capsules (200 mg) in the evening. 9/25/2021 at Unknown time Yes Albino Rainey MD   aspirin (ASA) 81 MG EC tablet TAKE 1 TABLET (81MG) BY MOUTH DAILY 9/25/2021 at Unknown time Yes Rowena Haas APRN CNP   atorvastatin (LIPITOR) 80 MG tablet TAKE 1 TABLET (80MG) BY MOUTH DAILY 9/24/2021 at Unknown time Yes Rowena Haas APRN CNP   benzonatate (TESSALON) 100 MG capsule Take 1 capsule (100 mg) by mouth 3 times daily as needed for cough prn at prn Yes Rowena Haas APRN CNP   clonazePAM (KLONOPIN) 0.5 MG tablet Take 1 tablet (0.5 mg) by mouth At Bedtime 9/24/2021 at Unknown time Yes Rowena Haas APRN CNP   escitalopram (LEXAPRO)  10 MG tablet Take 1 tablet (10 mg) by mouth daily 9/25/2021 at Unknown time Yes Rowena Haas APRN CNP   famotidine (PEPCID) 20 MG tablet Take 1 tablet (20 mg) by mouth daily 9/25/2021 at Unknown time Yes Rowena Haas APRN CNP   gabapentin (NEURONTIN) 300 MG capsule Take 1 capsule (300 mg) by mouth At Bedtime 9/24/2021 at Unknown time Yes Albino Rainey MD   hydrOXYzine (VISTARIL) 25 MG capsule Take 1 capsule (25 mg) by mouth every 4 hours as needed for anxiety May take nightly for sleep  Patient taking differently: Take 25 mg by mouth At Bedtime  9/24/2021 at Unknown time Yes Rowena Haas APRN CNP   insulin aspart (NOVOLOG FLEXPEN) 100 UNIT/ML pen Inject 10 Units Subcutaneous 3 times daily (with meals)  Patient taking differently: Inject 10 Units Subcutaneous 3 times daily (with meals) 5-10 minutes AFTER eating 9/25/2021 at Unknown time Yes Rowena Haas APRN CNP   insulin glargine (LANTUS PEN) 100 UNIT/ML pen Inject 35 Units Subcutaneous 2 times daily 9/25/2021 at Unknown time Yes Rowena Haas APRN CNP   losartan (COZAAR) 100 MG tablet TAKE 1 TABLET (100MG) BY MOUTH DAILY 9/25/2021 at Unknown time Yes Antionette Campo APRN CNP   metoprolol succinate ER (TOPROL-XL) 25 MG 24 hr tablet Take 2 tablets (50 mg) by mouth daily  Patient taking differently: Take 50 mg by mouth daily In the AM 9/25/2021 at Unknown time Yes Rowena Haas APRN CNP   nystatin (MYCOSTATIN) 558020 UNIT/GM external powder Apply topically 2 times daily as needed prn at prn Yes Rowena Haas APRN CNP   paliperidone ER (INVEGA) 9 MG 24 hr tablet Take 1 tablet (9 mg) by mouth At Bedtime 9/24/2021 at Unknown time Yes Rowena Haas APRN CNP   pantoprazole (PROTONIX) 40 MG EC tablet Take 1 tablet (40 mg) by mouth daily 9/25/2021 at Unknown time Yes Rowena Haas APRN CNP   senna-docusate (SENOKOT-S/PERICOLACE) 8.6-50 MG tablet Take 1 tablet by mouth 2 times daily as needed for constipation prn at  prn Yes Albino Rainey MD   topiramate (TOPAMAX) 200 MG tablet Take 1 tablet (200 mg) by mouth 2 times daily . Titrate to this dose as instructed in clinic. Note change in pill size.  Patient taking differently: Take 200 mg by mouth daily . Titrate to this dose as instructed in clinic. Note change in pill size. 9/25/2021 at Unknown time Yes Ruba Paz MD   traZODone (DESYREL) 100 MG tablet Take 1 tablet (100 mg) by mouth nightly as needed for sleep 9/24/2021 at Unknown time Yes Rowena Haas APRN CNP   TRULICITY 1.5 MG/0.5ML pen Inject 1.5 mg Subcutaneous once a week Every Friday 9/24/2021 Yes Rowena Haas APRN CNP   alcohol swab prep pads Use to swab area of injection/rodolfo as directed.   Rowena Haas APRN CNP   blood glucose (NO BRAND SPECIFIED) test strip Use to test blood sugar 10 times daily or as directed.   Rowena Haas APRN CNP   blood glucose monitoring (NO BRAND SPECIFIED) meter device kit Use to test blood sugar 10 times daily or as directed.   Rowena Haas APRN CNP   Continuous Blood Gluc Sensor (FREESTYLE LEBRON 14 DAY SENSOR) MISC 1 Device every 14 days Change sensor as directed every 14 days   Rowena Haas APRN CNP   insulin pen needle (NOVOFINE 30) 30G X 8 MM miscellaneous USE 4 DAILY OR AS DIRECTED   Rowena Haas APRN CNP   order for DME Equipment being ordered: Depends.   Rowena Haas APRN CNP   order for DME Equipment being ordered: CPAP Supplies.   Rowena Haas APRN CNP   thin (NO BRAND SPECIFIED) lancets Use with lanceting device. To accompany: Blood Glucose Monitor Brands: per insurance.   Rowena Haas APRN CNP       The information provided in this note is only as accurate as the sources available at the time of update(s)

## 2021-09-27 LAB
ANION GAP SERPL CALCULATED.3IONS-SCNC: 7 MMOL/L (ref 3–14)
ATRIAL RATE - MUSE: 250 BPM
BACTERIA UR CULT: NORMAL
BUN SERPL-MCNC: 19 MG/DL (ref 7–30)
CALCIUM SERPL-MCNC: 9.3 MG/DL (ref 8.5–10.1)
CHLORIDE BLD-SCNC: 109 MMOL/L (ref 94–109)
CO2 SERPL-SCNC: 19 MMOL/L (ref 20–32)
CREAT SERPL-MCNC: 1.06 MG/DL (ref 0.52–1.04)
CRP SERPL-MCNC: 71.1 MG/L (ref 0–8)
DIASTOLIC BLOOD PRESSURE - MUSE: NORMAL MMHG
GFR SERPL CREATININE-BSD FRML MDRD: 57 ML/MIN/1.73M2
GLUCOSE BLD-MCNC: 178 MG/DL (ref 70–99)
GLUCOSE BLDC GLUCOMTR-MCNC: 147 MG/DL (ref 70–99)
GLUCOSE BLDC GLUCOMTR-MCNC: 153 MG/DL (ref 70–99)
GLUCOSE BLDC GLUCOMTR-MCNC: 165 MG/DL (ref 70–99)
GLUCOSE BLDC GLUCOMTR-MCNC: 181 MG/DL (ref 70–99)
GLUCOSE BLDC GLUCOMTR-MCNC: 256 MG/DL (ref 70–99)
INTERPRETATION ECG - MUSE: NORMAL
P AXIS - MUSE: NORMAL DEGREES
POTASSIUM BLD-SCNC: 4.2 MMOL/L (ref 3.4–5.3)
PR INTERVAL - MUSE: NORMAL MS
QRS DURATION - MUSE: 84 MS
QT - MUSE: 348 MS
QTC - MUSE: 406 MS
R AXIS - MUSE: -25 DEGREES
SODIUM SERPL-SCNC: 135 MMOL/L (ref 133–144)
SYSTOLIC BLOOD PRESSURE - MUSE: NORMAL MMHG
T AXIS - MUSE: 1 DEGREES
VENTRICULAR RATE- MUSE: 82 BPM

## 2021-09-27 PROCEDURE — 250N000013 HC RX MED GY IP 250 OP 250 PS 637: Performed by: INTERNAL MEDICINE

## 2021-09-27 PROCEDURE — 250N000013 HC RX MED GY IP 250 OP 250 PS 637: Performed by: HOSPITALIST

## 2021-09-27 PROCEDURE — 250N000011 HC RX IP 250 OP 636: Performed by: INTERNAL MEDICINE

## 2021-09-27 PROCEDURE — 5A09357 ASSISTANCE WITH RESPIRATORY VENTILATION, LESS THAN 24 CONSECUTIVE HOURS, CONTINUOUS POSITIVE AIRWAY PRESSURE: ICD-10-PCS | Performed by: HOSPITALIST

## 2021-09-27 PROCEDURE — 250N000012 HC RX MED GY IP 250 OP 636 PS 637: Performed by: HOSPITALIST

## 2021-09-27 PROCEDURE — 120N000001 HC R&B MED SURG/OB

## 2021-09-27 PROCEDURE — 99233 SBSQ HOSP IP/OBS HIGH 50: CPT | Performed by: HOSPITALIST

## 2021-09-27 PROCEDURE — 36415 COLL VENOUS BLD VENIPUNCTURE: CPT | Performed by: INTERNAL MEDICINE

## 2021-09-27 PROCEDURE — 250N000011 HC RX IP 250 OP 636: Performed by: HOSPITALIST

## 2021-09-27 PROCEDURE — 999N000157 HC STATISTIC RCP TIME EA 10 MIN

## 2021-09-27 PROCEDURE — 94660 CPAP INITIATION&MGMT: CPT

## 2021-09-27 PROCEDURE — 80048 BASIC METABOLIC PNL TOTAL CA: CPT | Performed by: INTERNAL MEDICINE

## 2021-09-27 PROCEDURE — 86140 C-REACTIVE PROTEIN: CPT | Performed by: INTERNAL MEDICINE

## 2021-09-27 RX ORDER — CEFTRIAXONE 1 G/1
1 INJECTION, POWDER, FOR SOLUTION INTRAMUSCULAR; INTRAVENOUS EVERY 24 HOURS
Status: DISCONTINUED | OUTPATIENT
Start: 2021-09-27 | End: 2021-09-28

## 2021-09-27 RX ADMIN — ASPIRIN 81 MG: 81 TABLET, DELAYED RELEASE ORAL at 10:00

## 2021-09-27 RX ADMIN — ENOXAPARIN SODIUM 40 MG: 40 INJECTION SUBCUTANEOUS at 20:57

## 2021-09-27 RX ADMIN — ACETAMINOPHEN 650 MG: 325 TABLET, FILM COATED ORAL at 22:01

## 2021-09-27 RX ADMIN — AMANTADINE HYDROCHLORIDE 100 MG: 100 CAPSULE ORAL at 10:01

## 2021-09-27 RX ADMIN — INSULIN ASPART 1 UNITS: 100 INJECTION, SOLUTION INTRAVENOUS; SUBCUTANEOUS at 18:11

## 2021-09-27 RX ADMIN — GABAPENTIN 300 MG: 300 CAPSULE ORAL at 21:02

## 2021-09-27 RX ADMIN — INSULIN GLARGINE 10 UNITS: 100 INJECTION, SOLUTION SUBCUTANEOUS at 20:58

## 2021-09-27 RX ADMIN — ENOXAPARIN SODIUM 40 MG: 40 INJECTION SUBCUTANEOUS at 10:02

## 2021-09-27 RX ADMIN — METOPROLOL SUCCINATE 50 MG: 50 TABLET, EXTENDED RELEASE ORAL at 10:00

## 2021-09-27 RX ADMIN — ALBUTEROL SULFATE 2 PUFF: 90 AEROSOL, METERED RESPIRATORY (INHALATION) at 10:10

## 2021-09-27 RX ADMIN — FAMOTIDINE 20 MG: 20 TABLET ORAL at 10:01

## 2021-09-27 RX ADMIN — ALBUTEROL SULFATE 2 PUFF: 90 AEROSOL, METERED RESPIRATORY (INHALATION) at 21:02

## 2021-09-27 RX ADMIN — PALIPERIDONE 9 MG: 6 TABLET, EXTENDED RELEASE ORAL at 21:02

## 2021-09-27 RX ADMIN — CLONAZEPAM 0.5 MG: 0.5 TABLET ORAL at 21:02

## 2021-09-27 RX ADMIN — ALBUTEROL SULFATE 2 PUFF: 90 AEROSOL, METERED RESPIRATORY (INHALATION) at 13:40

## 2021-09-27 RX ADMIN — ESCITALOPRAM OXALATE 10 MG: 10 TABLET ORAL at 10:02

## 2021-09-27 RX ADMIN — INSULIN GLARGINE 10 UNITS: 100 INJECTION, SOLUTION SUBCUTANEOUS at 13:33

## 2021-09-27 RX ADMIN — CEFTRIAXONE 1 G: 1 INJECTION, POWDER, FOR SOLUTION INTRAMUSCULAR; INTRAVENOUS at 14:00

## 2021-09-27 RX ADMIN — ALBUTEROL SULFATE 2 PUFF: 90 AEROSOL, METERED RESPIRATORY (INHALATION) at 18:09

## 2021-09-27 RX ADMIN — ATORVASTATIN CALCIUM 80 MG: 80 TABLET, FILM COATED ORAL at 10:00

## 2021-09-27 RX ADMIN — PANTOPRAZOLE SODIUM 40 MG: 40 TABLET, DELAYED RELEASE ORAL at 10:37

## 2021-09-27 RX ADMIN — TOPIRAMATE 200 MG: 50 TABLET ORAL at 13:32

## 2021-09-27 RX ADMIN — TRAZODONE HYDROCHLORIDE 100 MG: 100 TABLET ORAL at 23:00

## 2021-09-27 ASSESSMENT — ACTIVITIES OF DAILY LIVING (ADL)
ADLS_ACUITY_SCORE: 16
ADLS_ACUITY_SCORE: 20
ADLS_ACUITY_SCORE: 22
ADLS_ACUITY_SCORE: 20
ADLS_ACUITY_SCORE: 20
ADLS_ACUITY_SCORE: 22

## 2021-09-27 NOTE — PROGRESS NOTES
Infectious Disease Progress Note    Date of Service : 09/27/2021     Assessment:  1.  COVID-19 infection with acute hypoxic respiratory failure  2.  GI symptoms related to COVID19 infection  3.  Acute renal insufficiency, probably dehydration.  4.  Diabetes mellitus with poor control.  5.  Coronary artery disease.  6.  Schizoaffective disorder.  7.  Prior chronic drug use, but in remission.  8.  Obstructive sleep apnea.  9.  POSACONAZOLE ALLERGY.  Unusual allergy.  Cannot find anything in the records or Care Everywhere regarding prior Aspergillus or such infections and the patient not aware.  10.  Recurrent urinary tract infections.     Recommendations:    1. Supportive care for COVID19  2. Monoclonal antibodies not indicated in hospital  3. Follow urine cx, on ceftriaxone for possible UTI    Mary Goddard MD    Interval History   Resting, feels ok. Denies diarrhea today, denies cough, mild shortness of breath. No additional complaints     Physical Exam   Temp: 97.9  F (36.6  C) Temp src: Oral BP: (!) 154/82 Pulse: 75   Resp: 22 SpO2: 98 % O2 Device: Nasal cannula Oxygen Delivery: 2 LPM  Vitals:    09/25/21 1847   Weight: 95.3 kg (210 lb)     Vital Signs with Ranges  Temp:  [97.5  F (36.4  C)-98.3  F (36.8  C)] 97.9  F (36.6  C)  Pulse:  [69-87] 75  Resp:  [18-26] 22  BP: (143-165)/(75-86) 154/82  FiO2 (%):  [50 %] 50 %  SpO2:  [81 %-100 %] 98 %    Constitutional: Awake, alert, cooperative, no apparent distress  Lungs: Clear to auscultation bilaterally, no crackles or wheezing  Cardiovascular: Regular rate and rhythm, normal S1 and S2, and no murmur noted  Abdomen: Normal bowel sounds, soft, non-distended, non-tender  Skin: No rashes, no cyanosis, no edema  Other:    Medications       albuterol  2 puff Inhalation 4x Daily     amantadine  100 mg Oral Daily     aspirin  81 mg Oral Daily     atorvastatin  80 mg Oral Daily     clonazePAM  0.5 mg Oral At Bedtime     enoxaparin  ANTICOAGULANT  40 mg Subcutaneous Q12H     escitalopram  10 mg Oral Daily     famotidine  20 mg Oral Daily     gabapentin  300 mg Oral At Bedtime     insulin aspart  1-7 Units Subcutaneous TID AC     insulin aspart  1-5 Units Subcutaneous At Bedtime     metoprolol succinate ER  50 mg Oral Daily     pantoprazole  40 mg Oral Daily     sodium chloride (PF)  3 mL Intracatheter Q8H     topiramate  200 mg Oral Daily     Data   All microbiology laboratory data reviewed.  Recent Labs   Lab Test 09/26/21  0803 09/25/21  1913 08/11/21  0937   WBC 5.2 7.4 6.5   HGB 9.4* 10.1* 10.2*   HCT 29.0* 30.9* 31.5*   MCV 96 97 97    177 183     Recent Labs   Lab Test 09/27/21  0756 09/26/21  0803 09/26/21  0114   CR 1.06* 1.20* 1.36*     Recent Labs   Lab Test 05/24/19  0916   SED 32*     Imaging  9/25 CXR  EXAM: XR CHEST PORT 1 VIEW  LOCATION: [facility]  DATE/TIME: 9/25/2021 7:39 PM     INDICATION: Dyspnea/SOB  COMPARISON: 07/15/2021                                                                      IMPRESSION: Mildly enlarged cardiac silhouette. Area of focally increased density in the left lateral lung zone, new from the comparison exam, suspicious for a small focus of pneumonia. Recommend six-week follow-up PA and lateral chest radiographs to   ensure resolution and exclude other pathology. No heart failure. No pneumothorax.

## 2021-09-27 NOTE — PLAN OF CARE
A&Ox4, flat affect. VSS on 2L, CPAP at HS. Tele SR with occasional PVCs. No BM overnight, voiding adequately. Up A1, Gb to bathroom. Reg diet. Covid precautions maintained

## 2021-09-27 NOTE — PLAN OF CARE
Dehydration/covid positive/suspected UTI. Neuros/CMs - lethargic/resting between cares, following commands, letting her needs known, calling appropriately, denying any numbness in her feet. VSS, capnography on 2 liters, unable to titrate this shift/intermittent nonproductive cough noted, no fevers noted.  Tele - SR 80s. Regular diet tolerated/fair appetite/lots of encouragement needed/pills whole with water./no nausea or vomiting.  Up with 1 assist to bedside commode/bm overnight, no diarrhea for 2 days. Some urinary retention/darker sheila urine. Denies pain. Pt scoring green on the Aggression Stop Light Tool. COVID precautions maintained.

## 2021-09-27 NOTE — PROGRESS NOTES
[facility]    Medicine Progress Note - Hospitalist Service       Date of Admission:  9/25/2021    Assessment & Plan         Nena Tang is a 61 year old female who was admitted on 9/25/2021.  She has a medical history of CAD, hypertension, hyperlipidemia, irritable bowel syndrome, depression, schizoaffective disorder, history of cocaine, heroin use in remission, CINDY on CPAP, diabetes mellitus presented to the ED with cough and fatigue and poor oral intake in the setting of Covid infection.     COVID-19 infection  Report of ongoing cough for 6 days. On 9/22 tested for Covid, positive test results noted on 9/24.  Associated fatigue, diarrhea, poor oral intake. No Shortness of breath, not requiring supplemental nasal oxygen. Fully vaccinated with 2 doses of a Moderna Covid vaccine.   In ED patient afebrile, oxygen saturation fluctuating between 91 to 94% on room air. Drowsy but arousable.  Bilateral decreased breath sounds not using accessory muscles of respiration. Able to speak in full sentences and falls asleep. WBC 7.4, hemoglobin 10.1.  Glucose of 177. Troponin undetectable, lactic acid 0.7. EKG no acute ST-T wave changes.    Admitted to inpatient.    Continuous pulse oximetry.    Telemetry monitoring.    Chest x-ray done on admission showed mildly enlarged cardiac silhouette, area of focal increased density in the left lateral lung zone new from the comparison exam suspicious for small focus of pneumonia.    ID consulted for possible monoclonal antibodies - no need for monoclonal antibodies currently per ID.    Patient is sleeping currently having episodes of apnea and hypoxia as per the nursing staff with 2 L of oxygen.    Mild shortness of breath, worse with activity.    Coughing intermittently Robitussin-DM ordered to help with the cough - albuterol inhaler 2 puff QID ordered.    Encourage incentive spirometry and Acapella.    Subcutaneous Lovenox for Pharmacological DVT  prophylaxis.    Suspected UTI  Patient complains of urinary urgency/frequency, feels like she is not completely emptying her bladder when she urinates.    UA abnormal.    Urine culture pending.    Given symptoms, will treat empirically with rocephin while awaiting urine culture results.     Acute kidney injury likely prerenal from poor oral intake, diarrhea, losartan use.  Baseline creatinine around 0.8-0.9. Upon admission: bicarb 18, creatinine 1.71, albumin 3.1, magnesium 1.9. UA with trace blood and no protein.    Hold PTA losartan in the setting of kidney injury.    Cr improved to 1.06 today.    Discontinued IV fluids on 9/26/21.    Avoid nephrotoxic drugs.    Recheck labs in AM.     Diarrhea likely from COVID-19.  Recent admission 8/2021 for diarrhea, uncontrolled diabetes at [facility], then enteric panel and C. difficile negative.    Improving per patient report.     Hypoglycemia likely from poor oral intake, ? Malfunctioning blood glucose monitor  Diabetes mellitus insulin-dependent with a hemoglobin A1c of 10.  In ED blood glucose 43, received IV dextrose infusion and blood glucose improving to 146.  Hemoglobin A1c 8.1 on 9/25/21.    Blood sugars trending up.    Restart Lantus, will decrease dose to 10 units BID (PTA dose 35 units BID).    Monitor blood sugars and use medium dose sliding scale NovoLog as indicated.    Monitor for hypoglycemia.    Group home staff concerned that patient's blood glucose monitored not working properly, will need to be calibrated prior to discharge with hospital monitor.     Coronary artery disease  Hypertension  Hyperlipidemia  Patient denies any chest pain as above EKG sinus rhythm with occasional PVCs.  Troponin undetectable.    Telemetry monitoring overnight.    Continue PTA aspirin, atorvastatin.    Continue PTA metoprolol.    Hold PTA losartan in the setting of kidney injury.     Depression  Shizoaffective disorder  Fibromyalgia  Borderline intellectual  functioning  History of cocaine, heroin use in remission    Group home resident.  Per discussion with group home staff no acute issues.    Contineu PTA Lexapro, Klonopin, gabapentin, Vistaril, amantadine, and Invega.      Physical deconditioning in the setting of COVID-19, chronic medical illness    PT/OT consulted for discharge disposition.     consulted.    Obstructive sleep apnea.    Hold off PTA CPAP as able given COVID-19.     Medically complicated obesity with a BMI 41.01.    Consider lifestyle modification with diet and exercise as able to.       Diet: Combination Diet Regular Diet Adult    DVT Prophylaxis: Enoxaparin (Lovenox) SQ  Estes Catheter: Not present  Central Lines: None  Code Status: Full Code      Disposition Plan   Expected discharge: continue inpatient care pending improvement in symptoms     The patient's care was discussed with the Bedside Nurse and Patient.    Washington Spears MD  Hospitalist Service  [facility]  Securely message with the Vocera Web Console (learn more here)  Text page via [employer] Paging/Directory      Clinically Significant Risk Factors Present on Admission               ______________________________________________________________________    Interval History   Nena FRANKS Kitty was seen this morning. She feels OK today. A little short of breath. Still having some loose stools, but overall improving. Feels like she isn't emptying her bladder completely when she urinates. Denies fevers, chest pain.    Data reviewed today: I reviewed all medications, new labs and imaging results over the last 24 hours. I personally reviewed no images or EKG's today.    Physical Exam   Vital Signs: Temp: 97.9  F (36.6  C) Temp src: Oral BP: (!) 154/82 Pulse: 75   Resp: 22 SpO2: 97 % O2 Device: Nasal cannula Oxygen Delivery: 2 LPM  Weight: 210 lbs 0 oz  Constitutional: awake, alert, cooperative, no apparent distress, laying in the hospital bed  Respiratory:  diminished at the bases  Cardiovascular: regular rate and rhythm, normal S1 and S2, no murmur noted  GI: normal bowel sounds, soft, obese, non-distended, non-tender  Skin: warm, dry  Musculoskeletal: no lower extremity pitting edema present  Neurologic: awake, alert, answered questions appropriately    Data   Recent Labs   Lab 09/27/21  0818 09/27/21  0756 09/27/21  0106 09/26/21  0904 09/26/21  0803 09/26/21  0148 09/26/21  0114 09/25/21 2035 09/25/21  1913   WBC  --   --   --   --  5.2  --   --   --  7.4   HGB  --   --   --   --  9.4*  --   --   --  10.1*   MCV  --   --   --   --  96  --   --   --  97   PLT  --   --   --   --  166  --   --   --  177   NA  --  135  --   --  136  --   --   --  134   POTASSIUM  --  4.2  --   --  3.6  --   --   --  3.8   CHLORIDE  --  109  --   --  108  --   --   --  104   CO2  --  19*  --   --  16*  --   --   --  18*   BUN  --  19  --   --  17  --   --   --  22   CR  --  1.06*  --   --  1.20*  --  1.36*   < > 1.71*   ANIONGAP  --  7  --   --  12  --   --   --  12   ALLEN  --  9.3  --   --  8.2*  --   --   --  8.5   * 178* 256*   < > 122*   < >  --    < > 43*   ALBUMIN  --   --   --   --  2.7*  --   --   --  3.1*   PROTTOTAL  --   --   --   --  7.3  --   --   --  8.0   BILITOTAL  --   --   --   --  0.6  --   --   --  0.6   ALKPHOS  --   --   --   --  63  --   --   --  68   ALT  --   --   --   --  31  --   --   --  31   AST  --   --   --   --  30  --   --   --  29   TROPONIN  --   --   --   --  <0.015  --   --   --  <0.015    < > = values in this interval not displayed.     Medications       albuterol  2 puff Inhalation 4x Daily     amantadine  100 mg Oral Daily     aspirin  81 mg Oral Daily     atorvastatin  80 mg Oral Daily     clonazePAM  0.5 mg Oral At Bedtime     enoxaparin ANTICOAGULANT  40 mg Subcutaneous Q12H     escitalopram  10 mg Oral Daily     famotidine  20 mg Oral Daily     gabapentin  300 mg Oral At Bedtime     insulin aspart  1-7 Units Subcutaneous TID AC      insulin aspart  1-5 Units Subcutaneous At Bedtime     metoprolol succinate ER  50 mg Oral Daily     pantoprazole  40 mg Oral Daily     sodium chloride (PF)  3 mL Intracatheter Q8H     topiramate  200 mg Oral Daily

## 2021-09-27 NOTE — CONSULTS
Care Management Initial Consult    General Information  Assessment completed with: Care Team Member, SHIRA Neves nurse  Type of CM/SW Visit: Initial Assessment    Primary Care Provider verified and updated as needed: Yes   Readmission within the last 30 days: no previous admission in last 30 days      Reason for Consult: discharge planning  Advance Care Planning:          Communication Assessment  Patient's communication style: spoken language (English or Bilingual)        Cognitive  Cognitive/Neuro/Behavioral: .WDL except  Level of Consciousness: lethargic  Arousal Level: arouses to voice  Orientation: oriented x 4  Mood/Behavior: calm, cooperative  Best Language: 0 - No aphasia  Speech: clear, spontaneous, logical    Living Environment:   People in home: other (see comments) (3 other residents, care team (1:4 ratio))     Current living Arrangements: group home      Able to return to prior arrangements: yes     Family/Social Support:  Care provided by: other (see comments) (care team)  Provides care for: no one, unable/limited ability to care for self  Marital Status:              Description of Support System:        Current Resources:   Patient receiving home care services: No     Community Resources: Group Home  Equipment currently used at home: walker, rolling  Supplies currently used at home: Diabetic Supplies    Employment/Financial:  Employment Status:          Financial Concerns:             Lifestyle & Psychosocial Needs:  Social Determinants of Health     Tobacco Use: Low Risk      Smoking Tobacco Use: Never Smoker     Smokeless Tobacco Use: Never Used   Alcohol Use:      Frequency of Alcohol Consumption:      Average Number of Drinks:      Frequency of Binge Drinking:    Financial Resource Strain:      Difficulty of Paying Living Expenses:    Food Insecurity:      Worried About Running Out of Food in the Last Year:      Ran Out of Food in the Last Year:    Transportation Needs:      Lack of  Transportation (Medical):      Lack of Transportation (Non-Medical):    Physical Activity:      Days of Exercise per Week:      Minutes of Exercise per Session:    Stress:      Feeling of Stress :    Social Connections:      Frequency of Communication with Friends and Family:      Frequency of Social Gatherings with Friends and Family:      Attends Jainism Services:      Active Member of Clubs or Organizations:      Attends Club or Organization Meetings:      Marital Status:    Intimate Partner Violence:      Fear of Current or Ex-Partner:      Emotionally Abused:      Physically Abused:      Sexually Abused:    Depression: Not at risk     PHQ-2 Score: 1   Housing Stability:      Unable to Pay for Housing in the Last Year:      Number of Places Lived in the Last Year:      Unstable Housing in the Last Year:        Functional Status:  Prior to admission patient needed assistance:  yes     Mental Health Status:     See PMH; no identified concerns at this time    Chemical Dependency Status:      See PMH; no identified concerns at this time          Values/Beliefs:  Spiritual, Cultural Beliefs, Jainism Practices, Values that affect care:             Additional Information:  CM consulted for discharge planning.  Patient lives at [facility] ([phone]).  Contacted home and spoke to nurse Neves.  She states patient is her own decision maker.  She has only been at the  for about 3 weeks, previously living with her sister.  They are still getting to know her.  They provide medication management and administration and meals. There are a total of 3 people in the home.  Staff ratio 1:4.  Patient has a room upstairs, which she requested before admission.  She has been requiring quite a bit of assistance to go up and down stairs.  She has also needed assistance getting out of bed and with bathing.  Staff sometimes need to overlap if 2 assist needed (especially for stairs).  They know she is probably weaker than usual  due to the COVID infection, but are unsure what her baseline was before she moved in.  They state she is supposed to use a CPAP but it cannot be found.  They are requesting if appropriate a referral be placed so she can get a sleep study.  They would allow home oxygen if required at discharge.    Orders at discharge to be faxed to  at [phone].  New medications to be sent to [facility] on [address] (phone [phone], fax [phone]).    Blanka Campoverde RN

## 2021-09-28 ENCOUNTER — APPOINTMENT (OUTPATIENT)
Dept: PHYSICAL THERAPY | Facility: CLINIC | Age: 61
DRG: 177 | End: 2021-09-28
Attending: HOSPITALIST
Payer: COMMERCIAL

## 2021-09-28 LAB
ANION GAP SERPL CALCULATED.3IONS-SCNC: 10 MMOL/L (ref 3–14)
BUN SERPL-MCNC: 20 MG/DL (ref 7–30)
CALCIUM SERPL-MCNC: 8.7 MG/DL (ref 8.5–10.1)
CHLORIDE BLD-SCNC: 107 MMOL/L (ref 94–109)
CO2 SERPL-SCNC: 18 MMOL/L (ref 20–32)
CREAT SERPL-MCNC: 1.26 MG/DL (ref 0.52–1.04)
CRP SERPL-MCNC: 58 MG/L (ref 0–8)
ERYTHROCYTE [DISTWIDTH] IN BLOOD BY AUTOMATED COUNT: 12.7 % (ref 10–15)
GFR SERPL CREATININE-BSD FRML MDRD: 46 ML/MIN/1.73M2
GLUCOSE BLD-MCNC: 247 MG/DL (ref 70–99)
GLUCOSE BLDC GLUCOMTR-MCNC: 136 MG/DL (ref 70–99)
GLUCOSE BLDC GLUCOMTR-MCNC: 156 MG/DL (ref 70–99)
GLUCOSE BLDC GLUCOMTR-MCNC: 163 MG/DL (ref 70–99)
GLUCOSE BLDC GLUCOMTR-MCNC: 179 MG/DL (ref 70–99)
GLUCOSE BLDC GLUCOMTR-MCNC: 234 MG/DL (ref 70–99)
HCT VFR BLD AUTO: 30.3 % (ref 35–47)
HGB BLD-MCNC: 9.8 G/DL (ref 11.7–15.7)
MCH RBC QN AUTO: 30.6 PG (ref 26.5–33)
MCHC RBC AUTO-ENTMCNC: 32.3 G/DL (ref 31.5–36.5)
MCV RBC AUTO: 95 FL (ref 78–100)
PLATELET # BLD AUTO: 260 10E3/UL (ref 150–450)
POTASSIUM BLD-SCNC: 4 MMOL/L (ref 3.4–5.3)
RBC # BLD AUTO: 3.2 10E6/UL (ref 3.8–5.2)
SARS-COV-2 AB SERPL IA-ACNC: >250 U/ML
SARS-COV-2 AB SERPL QL IA: POSITIVE
SODIUM SERPL-SCNC: 135 MMOL/L (ref 133–144)
WBC # BLD AUTO: 6.7 10E3/UL (ref 4–11)

## 2021-09-28 PROCEDURE — 250N000011 HC RX IP 250 OP 636: Performed by: HOSPITALIST

## 2021-09-28 PROCEDURE — 80048 BASIC METABOLIC PNL TOTAL CA: CPT | Performed by: HOSPITALIST

## 2021-09-28 PROCEDURE — 97116 GAIT TRAINING THERAPY: CPT | Mod: GP | Performed by: PHYSICAL THERAPIST

## 2021-09-28 PROCEDURE — 36415 COLL VENOUS BLD VENIPUNCTURE: CPT | Performed by: HOSPITALIST

## 2021-09-28 PROCEDURE — 86140 C-REACTIVE PROTEIN: CPT | Performed by: HOSPITALIST

## 2021-09-28 PROCEDURE — 120N000001 HC R&B MED SURG/OB

## 2021-09-28 PROCEDURE — 99232 SBSQ HOSP IP/OBS MODERATE 35: CPT | Performed by: HOSPITALIST

## 2021-09-28 PROCEDURE — 97161 PT EVAL LOW COMPLEX 20 MIN: CPT | Mod: GP | Performed by: PHYSICAL THERAPIST

## 2021-09-28 PROCEDURE — 250N000013 HC RX MED GY IP 250 OP 250 PS 637: Performed by: HOSPITALIST

## 2021-09-28 PROCEDURE — 97530 THERAPEUTIC ACTIVITIES: CPT | Mod: GP | Performed by: PHYSICAL THERAPIST

## 2021-09-28 PROCEDURE — 250N000012 HC RX MED GY IP 250 OP 636 PS 637: Performed by: HOSPITALIST

## 2021-09-28 PROCEDURE — 85027 COMPLETE CBC AUTOMATED: CPT | Performed by: HOSPITALIST

## 2021-09-28 PROCEDURE — 250N000011 HC RX IP 250 OP 636: Performed by: INTERNAL MEDICINE

## 2021-09-28 PROCEDURE — 250N000013 HC RX MED GY IP 250 OP 250 PS 637: Performed by: INTERNAL MEDICINE

## 2021-09-28 RX ADMIN — CEFTRIAXONE 1 G: 1 INJECTION, POWDER, FOR SOLUTION INTRAMUSCULAR; INTRAVENOUS at 14:28

## 2021-09-28 RX ADMIN — ASPIRIN 81 MG: 81 TABLET, DELAYED RELEASE ORAL at 09:37

## 2021-09-28 RX ADMIN — INSULIN GLARGINE 10 UNITS: 100 INJECTION, SOLUTION SUBCUTANEOUS at 09:00

## 2021-09-28 RX ADMIN — INSULIN ASPART 3 UNITS: 100 INJECTION, SOLUTION INTRAVENOUS; SUBCUTANEOUS at 14:29

## 2021-09-28 RX ADMIN — ALBUTEROL SULFATE 2 PUFF: 90 AEROSOL, METERED RESPIRATORY (INHALATION) at 18:32

## 2021-09-28 RX ADMIN — ALBUTEROL SULFATE 2 PUFF: 90 AEROSOL, METERED RESPIRATORY (INHALATION) at 09:38

## 2021-09-28 RX ADMIN — METOPROLOL SUCCINATE 50 MG: 50 TABLET, EXTENDED RELEASE ORAL at 09:37

## 2021-09-28 RX ADMIN — INSULIN ASPART 1 UNITS: 100 INJECTION, SOLUTION INTRAVENOUS; SUBCUTANEOUS at 18:32

## 2021-09-28 RX ADMIN — TOPIRAMATE 200 MG: 50 TABLET ORAL at 13:52

## 2021-09-28 RX ADMIN — CLONAZEPAM 0.5 MG: 0.5 TABLET ORAL at 21:42

## 2021-09-28 RX ADMIN — ACETAMINOPHEN 650 MG: 325 TABLET, FILM COATED ORAL at 16:25

## 2021-09-28 RX ADMIN — ALBUTEROL SULFATE 2 PUFF: 90 AEROSOL, METERED RESPIRATORY (INHALATION) at 21:57

## 2021-09-28 RX ADMIN — ENOXAPARIN SODIUM 40 MG: 40 INJECTION SUBCUTANEOUS at 21:50

## 2021-09-28 RX ADMIN — ENOXAPARIN SODIUM 40 MG: 40 INJECTION SUBCUTANEOUS at 09:37

## 2021-09-28 RX ADMIN — ESCITALOPRAM OXALATE 10 MG: 10 TABLET ORAL at 09:37

## 2021-09-28 RX ADMIN — PALIPERIDONE 9 MG: 6 TABLET, EXTENDED RELEASE ORAL at 21:43

## 2021-09-28 RX ADMIN — GABAPENTIN 300 MG: 300 CAPSULE ORAL at 21:42

## 2021-09-28 RX ADMIN — ATORVASTATIN CALCIUM 80 MG: 80 TABLET, FILM COATED ORAL at 09:37

## 2021-09-28 RX ADMIN — PANTOPRAZOLE SODIUM 40 MG: 40 TABLET, DELAYED RELEASE ORAL at 09:37

## 2021-09-28 RX ADMIN — INSULIN GLARGINE 10 UNITS: 100 INJECTION, SOLUTION SUBCUTANEOUS at 21:50

## 2021-09-28 RX ADMIN — ALBUTEROL SULFATE 2 PUFF: 90 AEROSOL, METERED RESPIRATORY (INHALATION) at 13:52

## 2021-09-28 RX ADMIN — FAMOTIDINE 20 MG: 20 TABLET ORAL at 09:37

## 2021-09-28 RX ADMIN — AMANTADINE HYDROCHLORIDE 100 MG: 100 CAPSULE ORAL at 09:37

## 2021-09-28 ASSESSMENT — ACTIVITIES OF DAILY LIVING (ADL)
ADLS_ACUITY_SCORE: 16
ADLS_ACUITY_SCORE: 15
ADLS_ACUITY_SCORE: 16
ADLS_ACUITY_SCORE: 16

## 2021-09-28 NOTE — DISCHARGE INSTRUCTIONS
"HOMECARE NOTE:   Your doctor has ordered home care to help you after your hospital stay.  The staff will contact you to schedule your first visit.  This service will be provided by [facility].  If you have any question, or have not received a call within 48 hours of discharge, please call them at [phone].  *please see homecare quality ratings for all homecares in your area at www.medicare.gov         Discharge Instructions for COVID-19 Patients  You have--or may have--COVID-19. Please follow the instructions listed below.   If you have a weakened immune system, discuss with your doctor any other actions you need to take.  How can I protect others?  If you have symptoms (fever, cough, body aches or trouble breathing):    Stay home and away from others (self-isolate) until:  ? Your other symptoms have resolved (gotten better). And   ? You've had no fever--and no medicine that reduces fever--for 1 full day (24 hours). And   ? At least 10 days have passed since your symptoms started. (You may need to wait 20 days. Follow the advice of your care team.)  If you don't show symptoms, but testing showed that you have COVID-19:    Stay home and away from others (self-isolate) until at least 10 days have passed since the date of your first positive COVID-19 test.  During this time    Stay in your own room, even for meals. Use your own bathroom if you can.    Stay away from others in your home. No hugging, kissing or shaking hands. No visitors.    Don't go to work, school or anywhere else.    Clean \"high touch\" surfaces often (doorknobs, counters, handles). Use household cleaning spray or wipes.    You'll find a full list of  on the EPA website: www.epa.gov/pesticide-registration/list-n-disinfectants-use-against-sars-cov-2.    Cover your mouth and nose with a mask or other face covering to avoid spreading germs.    Wash your hands and face often. Use soap and water.    Caregivers in these groups are at risk " for severe illness due to COVID-19:  ? People 65 years and older  ? People who live in a nursing home or long-term care facility  ? People with chronic disease (lung, heart, cancer, diabetes, kidney, liver, immunologic)  ? People who have a weakened immune system, including those who:    Are in cancer treatment    Take medicine that weakens the immune system, such as corticosteroids    Had a bone marrow or organ transplant    Have an immune deficiency    Have poorly controlled HIV or AIDS    Are obese (body mass index of 40 or higher)    Smoke regularly    Caregivers should wear gloves while washing dishes, handling laundry and cleaning bedrooms and bathrooms.    Use caution when washing and drying laundry: Don't shake dirty laundry and use the warmest water setting that you can.    For more tips on managing your health at home, go to www.cdc.gov/coronavirus/2019-ncov/downloads/10Things.pdf.  How can I take care of myself at home?  1. Get lots of rest. Drink extra fluids (unless a doctor has told you not to).  2. Take Tylenol (acetaminophen) for fever or pain. If you have liver or kidney problems, ask your family doctor if it's okay to take Tylenol.   Adults can take either:   ? 650 mg (two 325 mg pills) every 4 to 6 hours, or   ? 1,000 mg (two 500 mg pills) every 8 hours as needed.  ? Note: Don't take more than 3,000 mg in one day. Acetaminophen is found in many medicines (both prescribed and over-the-counter medicines). Read all labels to be sure you don't take too much.   For children, check the Tylenol bottle for the right dose. The dose is based on the child's age or weight.  3. If you have other health problems (like cancer, heart failure, an organ transplant or severe kidney disease): Call your specialty clinic if you don't feel better in the next 2 days.  4. Know when to call 911. Emergency warning signs include:  ? Trouble breathing or shortness of breath  ? Pain or pressure in the chest that doesn't go  away  ? Feeling confused like you haven't felt before, or not being able to wake up  ? Bluish-colored lips or face  5. Your doctor may have prescribed a blood thinner medicine. Follow their instructions.  Where can I get more information?    [facility] - About COVID-19:   [email or web address]    CDC - What to Do If You're Sick: www.cdc.gov/coronavirus/2019-ncov/about/steps-when-sick.html    CDC - Ending Home Isolation: www.cdc.gov/coronavirus/2019-ncov/hcp/disposition-in-home-patients.html    CDC - Caring for Someone: www.cdc.gov/coronavirus/2019-ncov/if-you-are-sick/care-for-someone.html    [facility] - Interim Guidance for Hospital Discharge to Home: www.[facility].[place]/diseases/coronavirus/hcp/hospdischarge.pdf    Below are the COVID-19 hotlines at the [place] Department of [facility] ([facility]). Interpreters are available.  ? For health questions: Call 842-795-3752 or 1-467.413.1697 (7 a.m. to 7 p.m.)  ? For questions about schools and childcare: Call 176-653-1979 or 1-910.717.3797 (7 a.m. to 7 p.m.)    For informational purposes only. Not to replace the advice of your health care provider. Clinically reviewed by Dr. Garry Naylor.   Copyright   2020 [facility]. All rights reserved. [employer] [id number] - REV 01/05/21.

## 2021-09-28 NOTE — PROGRESS NOTES
[facility]    Medicine Progress Note - Hospitalist Service       Date of Admission:  9/25/2021    Assessment & Plan         Nena Tang is a 61 year old female who was admitted on 9/25/2021.  She has a medical history of CAD, hypertension, hyperlipidemia, irritable bowel syndrome, depression, schizoaffective disorder, history of cocaine, heroin use in remission, CINDY on CPAP, diabetes mellitus presented to the ED with cough and fatigue and poor oral intake in the setting of Covid infection.     COVID-19 infection  Report of ongoing cough for 6 days. On 9/22 tested for Covid, positive test results noted on 9/24.  Associated fatigue, diarrhea, poor oral intake. No Shortness of breath, not requiring supplemental nasal oxygen. Fully vaccinated with 2 doses of a Moderna Covid vaccine.   In ED patient afebrile, oxygen saturation fluctuating between 91 to 94% on room air. Drowsy but arousable.  Bilateral decreased breath sounds not using accessory muscles of respiration. Able to speak in full sentences and falls asleep. WBC 7.4, hemoglobin 10.1.  Glucose of 177. Troponin undetectable, lactic acid 0.7. EKG no acute ST-T wave changes.    Admitted to inpatient.    Continuous pulse oximetry.    Telemetry monitoring.    Chest x-ray done on admission showed mildly enlarged cardiac silhouette, area of focal increased density in the left lateral lung zone new from the comparison exam suspicious for small focus of pneumonia.    ID consulted for possible monoclonal antibodies - no need for monoclonal antibodies currently per ID.    Patient has had episodes of apnea and hypoxia as per the nursing staff with 2 L of oxygen, subsequently weaned off supplemental oxygen.    Mild shortness of breath improving, worse with activity.    Coughing intermittently - Robitussin-DM ordered to help with the cough - albuterol inhaler 2 puff QID ordered.    Encourage incentive spirometry and Acapella.    Subcutaneous  Lovenox for Pharmacological DVT prophylaxis.    Suspected UTI  Patient complains of urinary urgency/frequency, feels like she is not completely emptying her bladder when she urinates.    UA abnormal.    Urine culture growing 10-50,000 cfu/ml of urogenital timoteo.    Given symptoms, treated empirically with rocephin while awaiting urine culture results.    ID following as noted above.     Acute kidney injury likely prerenal from poor oral intake, diarrhea, losartan use  Metabolic acidosis, likely due to KATY  Baseline creatinine around 0.8-0.9. Upon admission: bicarb 18, creatinine 1.71, albumin 3.1, magnesium 1.9. UA with trace blood and no protein.    Hold PTA losartan in the setting of kidney injury.    Creatinine improved overall, 1.26 this morning.    Discontinued IV fluids on 9/26/21.    Avoid nephrotoxic drugs.    Recheck labs in AM.     Diarrhea likely from COVID-19  Recent admission 8/2021 for diarrhea, uncontrolled diabetes at [facility], then enteric panel and C. difficile negative.    Diarrhea improved.     Hypoglycemia likely from poor oral intake, ? Malfunctioning blood glucose monitor  Diabetes mellitus insulin-dependent with a hemoglobin A1c of 10  In ED blood glucose 43, received IV dextrose infusion and blood glucose improving to 146.  Hemoglobin A1c 8.1 on 9/25/21.    Blood sugars better controlled, but still elevated at noon today..    Increase Lantus to 15 units BID (PTA dose 35 units BID).    Monitor blood sugars and use medium dose sliding scale NovoLog as indicated.    Monitor for hypoglycemia.    Group home staff concerned that patient's blood glucose monitored not working properly, will need to be calibrated prior to discharge with hospital monitor.     Coronary artery disease  Hypertension  Hyperlipidemia  Patient denies any chest pain as above EKG sinus rhythm with occasional PVCs.  Troponin undetectable.    Continue PTA aspirin, atorvastatin.    Continue PTA  metoprolol.    Hold PTA losartan in the setting of kidney injury and relatively low BP this morning.     Depression  Shizoaffective disorder  Fibromyalgia  Borderline intellectual functioning  History of cocaine, heroin use in remission    Group home resident.  Per discussion with group home staff no acute issues.    Continue PTA Lexapro, Klonopin, gabapentin, Vistaril, amantadine, and Invega.      Physical deconditioning in the setting of COVID-19, chronic medical illness    PT/OT consulted for discharge disposition.     consulted.    Appears that she will be able to discharge back to group home with assistance from staff and home health care.    Obstructive sleep apnea    Resume PTA CPAP as able.    Apparently her outpatient CPAP cannot be found. Will order outpatient sleep study after discharge.     Medically complicated obesity with a BMI 41.01.    Consider lifestyle modification with diet and exercise as able to.       Diet: Combination Diet Regular Diet Adult    DVT Prophylaxis: Enoxaparin (Lovenox) SQ  Estes Catheter: Not present  Central Lines: None  Code Status: Full Code      Disposition Plan   Expected discharge: likely back to group [place] tomorrow     The patient's care was discussed with the Bedside Nurse, Care Coordinator/ and Patient.    Washington Spears MD  Hospitalist Service  [facility]  Securely message with the Vocera Web Console (learn more here)  Text page via [employer] Paging/Directory      Clinically Significant Risk Factors Present on Admission               ______________________________________________________________________    Interval History   Nena Tang was seen this afternoon. She feels pretty good, better than yesterday. Has 'a little' shortness of breath and cough, overall improved. Denies fevers, chest pain, nausea, abdominal pain. Still feels weak. Appetite isn't very good. Discussed potential discharge back to group home  tomorrow, she is looking forward to it.    Data reviewed today: I reviewed all medications, new labs and imaging results over the last 24 hours. I personally reviewed no images or EKG's today.    Physical Exam   Vital Signs: Temp: 99.9  F (37.7  C) Temp src: Oral BP: 95/57 Pulse: 91   Resp: 18 SpO2: 94 % O2 Device: None (Room air) Oxygen Delivery: 2 LPM  Weight: 210 lbs 0 oz  Constitutional: awake, alert, cooperative, no apparent distress, laying in the hospital bed  Respiratory: diminished at the bases  Cardiovascular: regular rate and rhythm, normal S1 and S2, no murmur noted  GI: normal bowel sounds, soft, obese, non-distended, non-tender  Skin: warm, dry  Musculoskeletal: no lower extremity pitting edema present  Neurologic: awake, alert, answers questions appropriately    Data   Recent Labs   Lab 09/28/21  1300 09/28/21  1220 09/28/21  0930 09/27/21  0818 09/27/21  0756 09/26/21  0904 09/26/21  0803 09/25/21 2035 09/25/21  1913   WBC 6.7  --   --   --   --   --  5.2  --  7.4   HGB 9.8*  --   --   --   --   --  9.4*  --  10.1*   MCV 95  --   --   --   --   --  96  --  97     --   --   --   --   --  166  --  177     --   --   --  135  --  136   < > 134   POTASSIUM 4.0  --   --   --  4.2  --  3.6   < > 3.8   CHLORIDE 107  --   --   --  109  --  108   < > 104   CO2 18*  --   --   --  19*  --  16*   < > 18*   BUN 20  --   --   --  19  --  17   < > 22   CR 1.26*  --   --   --  1.06*  --  1.20*   < > 1.71*   ANIONGAP 10  --   --   --  7  --  12   < > 12   ALLEN 8.7  --   --   --  9.3  --  8.2*   < > 8.5   * 234* 136*   < > 178*   < > 122*   < > 43*   ALBUMIN  --   --   --   --   --   --  2.7*  --  3.1*   PROTTOTAL  --   --   --   --   --   --  7.3  --  8.0   BILITOTAL  --   --   --   --   --   --  0.6  --  0.6   ALKPHOS  --   --   --   --   --   --  63  --  68   ALT  --   --   --   --   --   --  31  --  31   AST  --   --   --   --   --   --  30  --  29   TROPONIN  --   --   --   --   --   --  <0.015   --  <0.015    < > = values in this interval not displayed.     Medications       albuterol  2 puff Inhalation 4x Daily     amantadine  100 mg Oral Daily     aspirin  81 mg Oral Daily     atorvastatin  80 mg Oral Daily     cefTRIAXone  1 g Intravenous Q24H     clonazePAM  0.5 mg Oral At Bedtime     enoxaparin ANTICOAGULANT  40 mg Subcutaneous Q12H     escitalopram  10 mg Oral Daily     famotidine  20 mg Oral Daily     gabapentin  300 mg Oral At Bedtime     insulin aspart  1-7 Units Subcutaneous TID AC     insulin aspart  1-5 Units Subcutaneous At Bedtime     insulin glargine  10 Units Subcutaneous BID     metoprolol succinate ER  50 mg Oral Daily     paliperidone ER  9 mg Oral At Bedtime     pantoprazole  40 mg Oral Daily     sodium chloride (PF)  3 mL Intracatheter Q8H     topiramate  200 mg Oral Daily

## 2021-09-28 NOTE — PLAN OF CARE
Reason for Admission: COVID-19, dehydration    Cognitive/Mentation: A/Ox 4  Neuros/CMS: Intact ex generalized weakness  VS: Stable. Tele: NSR.  GI: BS +, + flatus. Continent.  : Voids without issue. Continent.  Pulmonary: LS diminished bilaterally.  Pain: Denies.     Drains: None  Skin: None  Activity: SBA.  Diet: Reg with thin liquids. Takes pills whole    Discharge: Pending improvement    End of shift summary: Patient refused CPAP for nighttime use, O2 stats stable with momentary dips slightly below normal. Patient resting in bed throughout the night. Patient states she wants to sleep through the night, will use restroom in AM when she wakes up. Orders to bladder scan and straight cath after voiding.

## 2021-09-28 NOTE — PLAN OF CARE
Reason for Admission: dehydration and covid positive    Cognitive/Mentation: A/Ox 4  Neuros/CMS: Intact/ general weakness  VS: stable/afibrile. Tele: NSR.  GI: BS ,  flatus, last BM 9/27. Continent.  : . Continent.  Pulmonary: LS left lower lobe fine crackles.  Pain: denies  Activity: Assist x 1 with GB.  Diet: regular with thin liquids. Takes pills whole.    End of shift summary: patient continues to improve, slight SOB still, Sats 96% room air, appetite slowly improving. Plan for discharge back to [facility] tomorrow.

## 2021-09-28 NOTE — PROGRESS NOTES
Care Management Follow Up    Length of Stay (days): 3    Expected Discharge Date: 09/29/2021     Concerns to be Addressed: discharge planning     Patient plan of care discussed at interdisciplinary rounds: Yes    Anticipated Discharge Disposition: Group Home     Anticipated Discharge Services: home PT   Anticipated Discharge DME:      Patient/family educated on Medicare website which has current facility and service quality ratings: no  Education Provided on the Discharge Plan:    Patient/Family in Agreement with the Plan: yes    Referrals Placed by CM/SW: External Care Coordination  Private pay costs discussed: Not applicable    Additional Information:  Patient may be ready for discharge tomorrow 9/29.  Contacted Edinson, nurse at patient's .  Per Edinson they are able to accept her back at her current level of assistance.  She was previously in quarantine and Edinson is going to check with [facility] to see how much longer she will need to remain in quarantine when she returns and will need to prepare the staff.  We discussed that home care for PT will be set up and Edinson suggested that medical transport be arranged due to COVID.  She prefers later afternoon discharge to get everything in place for patient.      Called patient and discussed discharge plan for tomorrow and recommendation for [facility].  She was in agreement with plan and would like [facility] for home PT.  She also prefers a medical transport.  She would like her son updated.  Called her son Scot and updated him with plan and he was appreciative for call.    E stretcher set up for 9/29 at 1600.  Stretcher due to COVID diagnosis.      Addendum @ 1525:  Referral sent to [facility] and they are unable to accept.  Contacted [facility] and they are able to accept.  Referral faxed to [phone].  Orders to be faxed day of discharge.  PCS completed, faxed and placed on patient chart.    Blanka Campoverde RN, BSN, PHN  Inpatient Care Coordination  [facility]  [facility]  Phone: [phone]

## 2021-09-28 NOTE — PROGRESS NOTES
09/28/21 1025   Quick Adds   Type of Visit Initial PT Evaluation   Living Environment   People in home facility resident   Current Living Arrangements group home   Home Accessibility stairs within home   Number of Stairs, Within Home, Primary greater than 10 stairs  (to bedroom level)   Stair Railings, Within Home, Primary railing on right side (ascending)   Living Environment Comments resides in 2nd floor bedroom; stairs weren't difficult until she started to get weak   Self-Care   Usual Activity Tolerance good   Current Activity Tolerance fair   Equipment Currently Used at Home walker, rolling  (4WW)   Activity/Exercise/Self-Care Comment reports help with meds/meals; normally able to ambulate and do own cares   Disability/Function   Hearing Difficulty or Deaf no   Walking or Climbing Stairs Difficulty yes   Walking or Climbing Stairs ambulation difficulty, assistance 1 person;ambulation difficulty, requires equipment  (4WW)   Mobility Management 4WW; assist on stairs   Fall history within last six months no  (per patient report)   Change in Functional Status Since Onset of Current Illness/Injury yes   General Information   Onset of Illness/Injury or Date of Surgery 09/25/21   Referring Physician Washington Spears MD   Patient/Family Therapy Goals Statement (PT) return home   Pertinent History of Current Problem (include personal factors and/or comorbidities that impact the POC) per chart: Nena Tang is a 61 year old female who was admitted on 9/25/2021; see medical record for further information   Existing Precautions/Restrictions fall   Heart Disease Risk Factors Medical history   General Observations patient in bed; slow to speak but willing to participate   Cognition   Orientation Status (Cognition) oriented x 3   Cognitive Status Comments patient denies any memory impairment; appears slow to process information at times   Pain Assessment   Patient Currently in Pain No   Integumentary/Edema    Integumentary/Edema Comments see nursing notes   Posture    Posture Comments forward flexed in standing   Range of Motion (ROM)   ROM Comment WFL   Strength   Strength Comments appears to have functional weakness and poor activity tolerance from current illness   Bed Mobility   Comment (Bed Mobility) min A and use of bedrail/HOB elevated   Transfers   Transfer Safety Comments sit>stand with min A and safety cues; use of walker   Gait/Stairs (Locomotion)   Comment (Gait/Stairs) able to ambulate in room with min A and safety cues; slow speed   Balance   Balance Comments impaired; current need for walker and assist; no gross LOB   Clinical Impression   Criteria for Skilled Therapeutic Intervention yes, treatment indicated   PT Diagnosis (PT) impaired gait/transfers   Influenced by the following impairments weakness; decreased activity tolerance; slower to follow directions at times; some dizziness   Functional limitations due to impairments impaired independence with all functional mobility and cares   Clinical Presentation Evolving/Changing   Clinical Presentation Rationale clinical judgement; level of assist   Clinical Decision Making (Complexity) low complexity   Therapy Frequency (PT) Daily   Predicted Duration of Therapy Intervention (days/wks) 5 days   Planned Therapy Interventions (PT) bed mobility training;gait training;strengthening;stair training;transfer training   Anticipated Equipment Needs at Discharge (PT) walker, rolling   Risk & Benefits of therapy have been explained evaluation/treatment results reviewed;care plan/treatment goals reviewed;risks/benefits reviewed;current/potential barriers reviewed;participants voiced agreement with care plan;participants included;patient   PT Discharge Planning    PT Discharge Recommendation (DC Rec) home with assist;home with home care physical therapy;Transitional Care Facility   PT Rationale for DC Rec Anticipate with ongoing therapy and medical management,  patient should be able to return to Group home with assist for mobility and cares and Home PT; if patient unable to complete a flight f stairs to access bedroom and staff don't think they can provide this level of assist, may need TCU stay prior to return home   PT Brief overview of current status  Ax 1 with walker; falls risk; some dizziness   Total Evaluation Time   Total Evaluation Time (Minutes) 10

## 2021-09-28 NOTE — PROGRESS NOTES
[facility]    Infectious Disease Progress Note    Date of Service : 09/28/2021      Assessment:  1.  COVID-19 infection with acute hypoxic respiratory failure  2.  GI symptoms related to COVID19 infection  3.  Acute renal insufficiency.  4.  Diabetes mellitus with poor control.  5.  Coronary artery disease.  6.  Schizoaffective disorder.  7.  Prior chronic drug use, but in remission.  8.  Obstructive sleep apnea.  9.  POSACONAZOLE ALLERGY.  Unusual allergy.  Cannot find anything in the records or Care Everywhere regarding prior Aspergillus or such infections and the patient not aware.  10.  Recurrent urinary tract infections.     Recommendations:    1. Discontinue Ceftriaxone as urine cx shows mixed timoteo not suggestive of infection  2. supportive care for COVID     Stable and off oxygen , ID will sign off    Mary Goddard MD    Interval History   Feels well, off oxygen, denies cough or shortness of breath, no GI symptoms, appetite improved.    Physical Exam   Temp: 99.9  F (37.7  C) Temp src: Oral BP: 95/57 Pulse: 91   Resp: 18 SpO2: 94 % O2 Device: None (Room air) Oxygen Delivery: 2 LPM  Vitals:    09/25/21 1847   Weight: 95.3 kg (210 lb)     Vital Signs with Ranges  Temp:  [98.4  F (36.9  C)-99.9  F (37.7  C)] 99.9  F (37.7  C)  Pulse:  [72-91] 91  Resp:  [18-22] 18  BP: ()/(57-82) 95/57  SpO2:  [93 %-98 %] 94 %    Constitutional: Awake, alert, cooperative, no apparent distress  Lungs: Clear to auscultation bilaterally, no crackles or wheezing  Cardiovascular: Regular rate and rhythm, normal S1 and S2, and no murmur noted  Abdomen: Normal bowel sounds, soft, non-distended, non-tender  Skin: No rashes, no cyanosis, no edema    Other:    Medications       albuterol  2 puff Inhalation 4x Daily     amantadine  100 mg Oral Daily     aspirin  81 mg Oral Daily     atorvastatin  80 mg Oral Daily     cefTRIAXone  1 g Intravenous Q24H     clonazePAM  0.5 mg Oral At Bedtime     enoxaparin  ANTICOAGULANT  40 mg Subcutaneous Q12H     escitalopram  10 mg Oral Daily     famotidine  20 mg Oral Daily     gabapentin  300 mg Oral At Bedtime     insulin aspart  1-7 Units Subcutaneous TID AC     insulin aspart  1-5 Units Subcutaneous At Bedtime     insulin glargine  10 Units Subcutaneous BID     metoprolol succinate ER  50 mg Oral Daily     paliperidone ER  9 mg Oral At Bedtime     pantoprazole  40 mg Oral Daily     sodium chloride (PF)  3 mL Intracatheter Q8H     topiramate  200 mg Oral Daily       Data   All microbiology laboratory data reviewed.  Recent Labs   Lab Test 09/26/21  0803 09/25/21  1913 08/11/21  0937   WBC 5.2 7.4 6.5   HGB 9.4* 10.1* 10.2*   HCT 29.0* 30.9* 31.5*   MCV 96 97 97    177 183     Recent Labs   Lab Test 09/27/21  0756 09/26/21  0803 09/26/21  0114   CR 1.06* 1.20* 1.36*     Recent Labs   Lab Test 05/24/19  0916   SED 32*     Micro  9/26 urine cx  Culture 10,000-50,000 CFU/mL Mixture of urogenital timoteo           Imaging  9/25 CXR  EXAM: XR CHEST PORT 1 VIEW  LOCATION: [facility]  DATE/TIME: 9/25/2021 7:39 PM     INDICATION: Dyspnea/SOB  COMPARISON: 07/15/2021                                                                      IMPRESSION: Mildly enlarged cardiac silhouette. Area of focally increased density in the left lateral lung zone, new from the comparison exam, suspicious for a small focus of pneumonia. Recommend six-week follow-up PA and lateral chest radiographs to   ensure resolution and exclude other pathology. No heart failure. No pneumothorax

## 2021-09-28 NOTE — PLAN OF CARE
Here with COVID-19 infection, dehydration.  Lungs CTA.  Needs encouragement for IS & Acapella.  Infrequent, dry cough.  O2 94-95% room air.  Ate 50% of meal, 200ml fluid.  Tylenol for moderate headache at 2200. Tele NSR.  Up to bathroom with SBA.  Discharge plans pending improvement.

## 2021-09-29 VITALS
WEIGHT: 210 LBS | RESPIRATION RATE: 16 BRPM | DIASTOLIC BLOOD PRESSURE: 70 MMHG | HEIGHT: 60 IN | BODY MASS INDEX: 41.23 KG/M2 | SYSTOLIC BLOOD PRESSURE: 126 MMHG | OXYGEN SATURATION: 92 % | TEMPERATURE: 98.1 F | HEART RATE: 83 BPM

## 2021-09-29 DIAGNOSIS — G47.33 OSA (OBSTRUCTIVE SLEEP APNEA): Primary | ICD-10-CM

## 2021-09-29 LAB
ANION GAP SERPL CALCULATED.3IONS-SCNC: 10 MMOL/L (ref 3–14)
BUN SERPL-MCNC: 15 MG/DL (ref 7–30)
CALCIUM SERPL-MCNC: 8.6 MG/DL (ref 8.5–10.1)
CHLORIDE BLD-SCNC: 111 MMOL/L (ref 94–109)
CO2 SERPL-SCNC: 18 MMOL/L (ref 20–32)
CREAT SERPL-MCNC: 0.98 MG/DL (ref 0.52–1.04)
ERYTHROCYTE [DISTWIDTH] IN BLOOD BY AUTOMATED COUNT: 12.7 % (ref 10–15)
GFR SERPL CREATININE-BSD FRML MDRD: 63 ML/MIN/1.73M2
GLUCOSE BLD-MCNC: 133 MG/DL (ref 70–99)
GLUCOSE BLDC GLUCOMTR-MCNC: 137 MG/DL (ref 70–99)
HCT VFR BLD AUTO: 28.8 % (ref 35–47)
HGB BLD-MCNC: 9.4 G/DL (ref 11.7–15.7)
MCH RBC QN AUTO: 30.9 PG (ref 26.5–33)
MCHC RBC AUTO-ENTMCNC: 32.6 G/DL (ref 31.5–36.5)
MCV RBC AUTO: 95 FL (ref 78–100)
PLATELET # BLD AUTO: 263 10E3/UL (ref 150–450)
POTASSIUM BLD-SCNC: 3.9 MMOL/L (ref 3.4–5.3)
RBC # BLD AUTO: 3.04 10E6/UL (ref 3.8–5.2)
SODIUM SERPL-SCNC: 139 MMOL/L (ref 133–144)
WBC # BLD AUTO: 5.7 10E3/UL (ref 4–11)

## 2021-09-29 PROCEDURE — 250N000011 HC RX IP 250 OP 636: Performed by: INTERNAL MEDICINE

## 2021-09-29 PROCEDURE — 99239 HOSP IP/OBS DSCHRG MGMT >30: CPT | Performed by: HOSPITALIST

## 2021-09-29 PROCEDURE — G0008 ADMIN INFLUENZA VIRUS VAC: HCPCS | Performed by: HOSPITALIST

## 2021-09-29 PROCEDURE — 250N000013 HC RX MED GY IP 250 OP 250 PS 637: Performed by: HOSPITALIST

## 2021-09-29 PROCEDURE — 250N000013 HC RX MED GY IP 250 OP 250 PS 637: Performed by: INTERNAL MEDICINE

## 2021-09-29 PROCEDURE — 90682 RIV4 VACC RECOMBINANT DNA IM: CPT | Performed by: HOSPITALIST

## 2021-09-29 PROCEDURE — 85027 COMPLETE CBC AUTOMATED: CPT | Performed by: HOSPITALIST

## 2021-09-29 PROCEDURE — 80048 BASIC METABOLIC PNL TOTAL CA: CPT | Performed by: HOSPITALIST

## 2021-09-29 PROCEDURE — 250N000011 HC RX IP 250 OP 636: Performed by: HOSPITALIST

## 2021-09-29 PROCEDURE — 36415 COLL VENOUS BLD VENIPUNCTURE: CPT | Performed by: HOSPITALIST

## 2021-09-29 PROCEDURE — 250N000012 HC RX MED GY IP 250 OP 636 PS 637: Performed by: HOSPITALIST

## 2021-09-29 RX ORDER — ALBUTEROL SULFATE 90 UG/1
2 AEROSOL, METERED RESPIRATORY (INHALATION) 4 TIMES DAILY PRN
Qty: 8 G | Refills: 0 | Status: SHIPPED | OUTPATIENT
Start: 2021-09-29 | End: 2022-08-23

## 2021-09-29 RX ORDER — INSULIN ASPART 100 [IU]/ML
3 INJECTION, SOLUTION INTRAVENOUS; SUBCUTANEOUS
COMMUNITY
Start: 2021-09-29 | End: 2021-10-07

## 2021-09-29 RX ORDER — ACETAMINOPHEN 325 MG/1
650 TABLET ORAL EVERY 6 HOURS PRN
COMMUNITY
Start: 2021-09-29 | End: 2021-10-07

## 2021-09-29 RX ADMIN — ALBUTEROL SULFATE 2 PUFF: 90 AEROSOL, METERED RESPIRATORY (INHALATION) at 13:49

## 2021-09-29 RX ADMIN — TOPIRAMATE 200 MG: 50 TABLET ORAL at 09:28

## 2021-09-29 RX ADMIN — PANTOPRAZOLE SODIUM 40 MG: 40 TABLET, DELAYED RELEASE ORAL at 09:29

## 2021-09-29 RX ADMIN — ALBUTEROL SULFATE 2 PUFF: 90 AEROSOL, METERED RESPIRATORY (INHALATION) at 09:29

## 2021-09-29 RX ADMIN — INFLUENZA A VIRUS A/WISCONSIN/588/2019 (H1N1) RECOMBINANT HEMAGGLUTININ ANTIGEN, INFLUENZA A VIRUS A/TASMANIA/503/2020 (H3N2) RECOMBINANT HEMAGGLUTININ ANTIGEN, INFLUENZA B VIRUS B/WASHINGTON/02/2019 RECOMBINANT HEMAGGLUTININ ANTIGEN, AND INFLUENZA B VIRUS B/PHUKET/3073/2013 RECOMBINANT HEMAGGLUTININ ANTIGEN 0.5 ML: 45; 45; 45; 45 INJECTION INTRAMUSCULAR at 15:57

## 2021-09-29 RX ADMIN — AMANTADINE HYDROCHLORIDE 100 MG: 100 CAPSULE ORAL at 09:29

## 2021-09-29 RX ADMIN — METOPROLOL SUCCINATE 50 MG: 50 TABLET, EXTENDED RELEASE ORAL at 09:29

## 2021-09-29 RX ADMIN — ATORVASTATIN CALCIUM 80 MG: 80 TABLET, FILM COATED ORAL at 09:29

## 2021-09-29 RX ADMIN — INSULIN GLARGINE 10 UNITS: 100 INJECTION, SOLUTION SUBCUTANEOUS at 09:27

## 2021-09-29 RX ADMIN — ESCITALOPRAM OXALATE 10 MG: 10 TABLET ORAL at 09:29

## 2021-09-29 RX ADMIN — ASPIRIN 81 MG: 81 TABLET, DELAYED RELEASE ORAL at 09:27

## 2021-09-29 RX ADMIN — FAMOTIDINE 20 MG: 20 TABLET ORAL at 09:29

## 2021-09-29 RX ADMIN — ENOXAPARIN SODIUM 40 MG: 40 INJECTION SUBCUTANEOUS at 09:29

## 2021-09-29 ASSESSMENT — ACTIVITIES OF DAILY LIVING (ADL)
ADLS_ACUITY_SCORE: 15

## 2021-09-29 NOTE — PLAN OF CARE
Physical Therapy Discharge Summary    Reason for therapy discharge:    Discharged to home with home therapy.    Progress towards therapy goal(s). See goals on Care Plan in [facility] electronic health record for goal details.  Goals not met.  Barriers to achieving goals:   limited tolerance for therapy and discharge from facility.    Therapy recommendation(s):    Continued therapy is recommended.  Rationale/Recommendations:  Home PT and assist from Group Home staff to maximize return to PLOF.

## 2021-09-29 NOTE — PLAN OF CARE
Pt here with covid 19 positive and dehydration. A/O X 4.VSS except for hypertensive.Pt is on 2l O2 NC due to sleep apnea. Refused to wear CPAP at night time.Tele SR w/ BBB. Reg diet with thin liquids. Takes pills whole. Up with SBA/GB. Loose BM x 2  . Denies pain. Pt scoring green on the Aggression Stop Light Tool. Possible discharge today 9/29 at 1600 to group home.

## 2021-09-29 NOTE — PROGRESS NOTES
Patient discharged at 4:32 PM to group home.  IV was discontinued. Pain at time of discharge was 0/10. Belongings returned to patient.  Discharge instructions and medications reviewed with patient.  Patient verbalized understanding and all questions were answered.  At time of discharge, patient condition was stable and left the unit via stretcher escorted by EMS.

## 2021-09-29 NOTE — PROGRESS NOTES
Care Management Discharge Note    Discharge Date: 09/29/2021       Discharge Disposition: Group Home    Discharge Services:      Discharge DME:      Discharge Transportation:      Private pay costs discussed: transportation costs    PAS Confirmation Code:    Patient/family educated on Medicare website which has current facility and service quality ratings: no    Education Provided on the Discharge Plan:    Persons Notified of Discharge Plans: TBD  Patient/Family in Agreement with the Plan: yes    Handoff Referral Completed: No    Additional Information:  Following for discharge planning.  Plan back to [facility] stretcher at 1600.  Updated Ally  of return.  Orders faxed to [phone] (Group Home) and [facility] [phone].  Bedside Nurse to review AVS.   Meds filled at [place].  to watch for them.         Jazmyn Ferreira RN

## 2021-09-29 NOTE — DISCHARGE SUMMARY
Discharge Summary  Hospitalist    Date of Admission:  9/25/2021  Date of Discharge:  9/29/2021  Discharging Provider: Washington Spears MD  Date of Service (when I saw the patient): 09/29/21    Discharge Diagnoses   Pneumonia due to COVID-19  Acute Respiratory Failure with Hypoxia   Suspected UTI, ruled out  Acute kidney injury likely prerenal from poor oral intake, diarrhea, losartan use  Metabolic acidosis, likely due to KATY  Diarrhea likely from COVID-19  Hypoglycemia likely from poor oral intake, ? Malfunctioning blood glucose monitor  Diabetes mellitus insulin-dependent with a hemoglobin A1c of 10  Coronary artery disease  Hypertension  Hyperlipidemia  Depression  Shizoaffective disorder  Fibromyalgia  Borderline intellectual functioning  History of cocaine, heroin use in remission  Physical deconditioning in the setting of COVID-19, chronic medical illness  Obstructive sleep apnea  Medically complicated obesity with a BMI 41.01    History of Present Illness   Nena Tang is a 61 year old female who was admitted on 9/25/2021.  She has a medical history of CAD, hypertension, hyperlipidemia, irritable bowel syndrome, depression, schizoaffective disorder, history of cocaine, heroin use in remission, CINDY on CPAP, diabetes mellitus presented to the ED with cough and fatigue and poor oral intake in the setting of Covid infection.    Hospital Course   Nena aTng was admitted on 9/25/2021.  The following problems were addressed during her hospitalization:    Pneumonia due to COVID-19  Acute Respiratory Failure with Hypoxia   Report of ongoing cough for 6 days. On 9/22 tested for Covid, positive test results noted on 9/24.  Associated fatigue, diarrhea, poor oral intake. No Shortness of breath, not requiring supplemental nasal oxygen. Fully vaccinated with 2 doses of a Moderna Covid vaccine.   In ED patient afebrile, oxygen saturation fluctuating between 91 to 94% on  room air. Drowsy but arousable.  Bilateral decreased breath sounds not using accessory muscles of respiration. Able to speak in full sentences and falls asleep. WBC 7.4, hemoglobin 10.1.  Glucose of 177. Troponin undetectable, lactic acid 0.7. EKG no acute ST-T wave changes.    Admitted to inpatient.    Continuous pulse oximetry.    Telemetry monitoring.    Chest x-ray done on admission showed mildly enlarged cardiac silhouette, area of focal increased density in the left lateral lung zone new from the comparison exam suspicious for small focus of pneumonia.    ID consulted for possible monoclonal antibodies - no need for monoclonal antibodies currently per ID. She did not receive remdesevir or dexamethasone while in the hospital.    Patient has had episodes of apnea and hypoxia as per the nursing staff with 2 L of oxygen, subsequently weaned off supplemental oxygen prior to discharge.    Coughing intermittently - albuterol inhaler 2 puff QID ordered.    Encouraged incentive spirometry and Acapella.    Subcutaneous Lovenox for Pharmacological DVT prophylaxis while in the hospital, will switch to low dose Eliquis upon discharge for 30 days.    Symptoms have gradually improved, but she remains generally weak and short of breath with activity. Appetite is poor.    Discharge back to group home today.    Discussed reasons to seek medical attention prior to follow-up appointment.    Suspected UTI, ruled out  Patient complains of urinary urgency/frequency, feels like she is not completely emptying her bladder when she urinates.    UA abnormal.    Urine culture grew 10-50,000 cfu/ml of urogenital timoteo.    Initially treated with empiric rocephin which was then discontinued when urine culture results were available.     Acute kidney injury likely prerenal from poor oral intake, diarrhea, losartan use  Metabolic acidosis, likely due to KATY  Baseline creatinine around 0.8-0.9. Upon admission: bicarb 18, creatinine 1.71,  albumin 3.1, magnesium 1.9. UA with trace blood and no protein.    Held PTA losartan in the setting of kidney injury.    Creatinine improved with IV fluids, down to 0.98 on the day of discharge.     Diarrhea likely from COVID-19  Recent admission 8/2021 for diarrhea, uncontrolled diabetes at [facility], then enteric panel and C. difficile negative.    Diarrhea much improved.     Hypoglycemia likely from poor oral intake, ? Malfunctioning blood glucose monitor  Diabetes mellitus insulin-dependent with a hemoglobin A1c of 10  In ED blood glucose 43, received IV dextrose infusion and blood glucose improving to 146.  Hemoglobin A1c 8.1 on 9/25/21.    Blood sugars better controlled, but still elevated at noon today..    PTA lantus and NovoLog currently at lower doses due to decreased appetite in setting of COVID-19 infection.    Monitor blood glucose at group home and adjust insulin regimen as indicated.     Coronary artery disease  Hypertension  Hyperlipidemia  Patient denies any chest pain as above EKG sinus rhythm with occasional PVCs.  Troponin undetectable.    Continue PTA aspirin, atorvastatin.    Continue PTA metoprolol.    Held PTA losartan in the setting of kidney injury and relatively low BP. Will resume upon discharge.     Depression  Shizoaffective disorder  Fibromyalgia  Borderline intellectual functioning  History of cocaine, heroin use in remission    Group home resident.  Per discussion with group home staff no acute issues.    Continue PTA Lexapro, Klonopin, gabapentin, Vistaril, amantadine, and Invega.      Physical deconditioning in the setting of COVID-19, chronic medical illness    PT/OT consulted for discharge disposition.     consulted.    Plan to discharge back to group home with assistance from staff and home health care PT.    Obstructive sleep apnea    Resume PTA CPAP as able.    Apparently her outpatient CPAP cannot be found. Order outpatient sleep study which can  be done after she recovers from COVID-19.     Medically complicated obesity with a BMI 41.01.    Consider lifestyle modification with diet and exercise as able to.    Washington Spears MD    Significant Results and Procedures   As above.    Pending Results   These results will be followed up by Dr. Speras.  Unresulted Labs Ordered in the Past 30 Days of this Admission     Date and Time Order Name Status Description    9/25/2021  7:21 PM Blood Culture Peripheral Blood Preliminary     9/25/2021  7:21 PM Blood Culture Peripheral Blood Preliminary         Code Status   Full Code       Primary Care Physician   Rowena Haas    Physical Exam   Temp: 98.1  F (36.7  C) Temp src: Oral BP: 126/70 Pulse: 83   Resp: 16 SpO2: 92 % O2 Device: None (Room air) Oxygen Delivery: 2 LPM  Vitals:    09/25/21 1847   Weight: 95.3 kg (210 lb)     Vital Signs with Ranges  Temp:  [98  F (36.7  C)-98.6  F (37  C)] 98.1  F (36.7  C)  Pulse:  [63-88] 83  Resp:  [16-22] 16  BP: (126-142)/(48-75) 126/70  SpO2:  [92 %-98 %] 92 %  I/O last 3 completed shifts:  In: 540 [P.O.:540]  Out: -     Constitutional: awake, alert, cooperative, no apparent distress, laying in the hospital bed  Respiratory: clear to auscultation bilaterally, no crackles or wheezing  Cardiovascular: regular rate and rhythm, normal S1 and S2, no murmur noted  GI: normal bowel sounds, soft, obese, non-distended, non-tender  Skin: warm, dry  Musculoskeletal: no lower extremity pitting edema present  Neurologic: awake, alert, answers questions appropriately, moves all extremities    Discharge Disposition   Discharged to home  Condition at discharge: Stable    Consultations This Hospital Stay   CARE MANAGEMENT / SOCIAL WORK IP CONSULT  INFECTIOUS DISEASES IP CONSULT  PHYSICAL THERAPY ADULT IP CONSULT    Time Spent on this Encounter   IWashington MD, personally saw the patient today and spent greater than 30 minutes discharging this patient.    Discharge Orders       COVID-19 GetWell Loop Referral      Home Care PT Referral for Hospital Discharge      Reason for your hospital stay    COVID-19 infection     Contact provider    Contact your primary care provider if If increased trouble breathing, new arm/leg swelling, dizziness/passing out, falls, bleeding that doesn't stop, or uncontrolled pain.     Discharge - Quarantine/Isolation Instruction    Date of symptom onset:   9/16/21  Date of first positive test:  9/22/21  Stay home and away from others (self-isolate) for at least 20 days from when your symptoms started And...   You've had no fevers and no medicine that reduces fever for 1 full day (24 hours)  And...   Your other symptoms have resolved (gotten better).     Activity    Your activity upon discharge: activity as tolerated with assistance     MD face to face encounter    Documentation of Face to Face and Certification for Home Health Services    I certify that patient: Nena Tang is under my care and that I, or a nurse practitioner or physician's assistant working with me, had a face-to-face encounter that meets the physician face-to-face encounter requirements with this patient on: 9/29/2021.    This encounter with the patient was in whole, or in part, for the following medical condition, which is the primary reason for home health care: COVID-19.    I certify that, based on my findings, the following services are medically necessary home health services: Physical Therapy.    My clinical findings support the need for the above services because: Physical Therapy Services are needed to assess and treat the following functional impairments: impaired strength and mobility related to COVID-19 infection.    Further, I certify that my clinical findings support that this patient is homebound (i.e. absences from home require considerable and taxing effort and are for medical reasons or [employer] witness services or infrequently or of short duration when for other reasons)  because: Requires assistance of another person or specialized equipment to access medical services because patient: Requires supervision of another for safe transfer...    Based on the above findings. I certify that this patient is confined to the home and needs intermittent skilled nursing care, physical therapy and/or speech therapy.  The patient is under my care, and I have initiated the establishment of the plan of care.  This patient will be followed by a physician who will periodically review the plan of care.  Physician/Provider to provide follow up care: Rowena Haas    Attending hospital physician (the Medicare certified [place] provider): Washington Spears MD  Physician Signature: See electronic signature associated with these discharge orders.  Date: 9/29/2021     Follow-up and recommended labs and tests     Follow up with primary care provider, Rowena Haas, within 7 days for hospital follow- up following hospital admission for COVID-19 infection.  The following labs/tests are recommended: CBC and BMP.     Discharge Instructions    Monitor blood sugars four times per day before meals and at bedtime.     Diet    Follow this diet upon discharge: Regular     Discharge Medications   Current Discharge Medication List      START taking these medications    Details   acetaminophen (TYLENOL) 325 MG tablet Take 2 tablets (650 mg) by mouth every 6 hours as needed for mild pain or other (and adjunct with moderate or severe pain or per patient request)    Associated Diagnoses: 2019 novel coronavirus disease (COVID-19)      albuterol (PROAIR HFA/PROVENTIL HFA/VENTOLIN HFA) 108 (90 Base) MCG/ACT inhaler Inhale 2 puffs into the lungs 4 times daily as needed for shortness of breath / dyspnea  Qty: 8 g, Refills: 0    Comments: Pharmacy may dispense brand covered by insurance (Proair, or proventil or ventolin or generic albuterol inhaler)  Associated Diagnoses: 2019 novel coronavirus disease (COVID-19)       apixaban ANTICOAGULANT (ELIQUIS) 2.5 MG tablet Take 1 tablet (2.5 mg) by mouth 2 times daily  Qty: 60 tablet, Refills: 0    Associated Diagnoses: 2019 novel coronavirus disease (COVID-19)         CONTINUE these medications which have CHANGED    Details   insulin aspart (NOVOLOG FLEXPEN) 100 UNIT/ML pen Inject 3 Units Subcutaneous 3 times daily (with meals)    Associated Diagnoses: Type 2 diabetes mellitus with mild nonproliferative retinopathy without macular edema, with long-term current use of insulin, unspecified laterality (H)      insulin glargine (LANTUS PEN) 100 UNIT/ML pen Inject 15 Units Subcutaneous 2 times daily    Comments: If Lantus is not covered by insurance, may substitute Basaglar at same dose and frequency.    Associated Diagnoses: Type 2 diabetes mellitus with mild nonproliferative retinopathy without macular edema, with long-term current use of insulin, unspecified laterality (H)         CONTINUE these medications which have NOT CHANGED    Details   acetaminophen (TYLENOL) 650 MG CR tablet Take 1 tablet (650 mg) by mouth every 8 hours as needed for mild pain or fever  Qty: 120 tablet, Refills: 1    Associated Diagnoses: Chronic right-sided low back pain without sciatica      !! amantadine (SYMMETREL) 100 MG capsule Take 200 mg by mouth every evening 2 capsules in the evening (1 in the am)       !! amantadine (SYMMETREL) 100 MG capsule Take 1 capsule (100 mg)  in the morning and 2 capsules (200 mg) in the evening.  Qty: 60 capsule, Refills: 3    Associated Diagnoses: Schizoaffective disorder, depressive type (H)      aspirin (ASA) 81 MG EC tablet TAKE 1 TABLET (81MG) BY MOUTH DAILY  Qty: 90 tablet, Refills: 1    Associated Diagnoses: Coronary artery disease involving native heart with angina pectoris, unspecified vessel or lesion type (H)      atorvastatin (LIPITOR) 80 MG tablet TAKE 1 TABLET (80MG) BY MOUTH DAILY  Qty: 30 tablet, Refills: 11    Associated Diagnoses: Hyperlipidemia LDL goal <100       benzonatate (TESSALON) 100 MG capsule Take 1 capsule (100 mg) by mouth 3 times daily as needed for cough  Qty: 90 capsule, Refills: 1    Associated Diagnoses: Cough      clonazePAM (KLONOPIN) 0.5 MG tablet Take 1 tablet (0.5 mg) by mouth At Bedtime  Qty: 30 tablet, Refills: 0    Associated Diagnoses: Schizoaffective disorder, bipolar type (H)      escitalopram (LEXAPRO) 10 MG tablet Take 1 tablet (10 mg) by mouth daily  Qty: 30 tablet, Refills: 3    Associated Diagnoses: Other schizophrenia (H)      famotidine (PEPCID) 20 MG tablet Take 1 tablet (20 mg) by mouth daily  Qty: 90 tablet, Refills: 3    Associated Diagnoses: Gastroesophageal reflux disease without esophagitis      gabapentin (NEURONTIN) 300 MG capsule Take 1 capsule (300 mg) by mouth At Bedtime  Qty: 30 capsule, Refills: 3    Associated Diagnoses: Chronic right-sided low back pain without sciatica; Schizoaffective disorder, depressive type (H)      hydrOXYzine (VISTARIL) 25 MG capsule Take 1 capsule (25 mg) by mouth every 4 hours as needed for anxiety May take nightly for sleep  Qty: 60 capsule, Refills: 3    Associated Diagnoses: Psychophysiological insomnia      losartan (COZAAR) 100 MG tablet TAKE 1 TABLET (100MG) BY MOUTH DAILY  Qty: 30 tablet, Refills: 5    Comments: Maximum Refills Reached  Associated Diagnoses: Coronary artery disease involving native heart with angina pectoris, unspecified vessel or lesion type (H)      metoprolol succinate ER (TOPROL-XL) 25 MG 24 hr tablet Take 2 tablets (50 mg) by mouth daily  Qty: 180 tablet, Refills: 3    Associated Diagnoses: HTN, goal below 140/90      nystatin (MYCOSTATIN) 323917 UNIT/GM external powder Apply topically 2 times daily as needed  Qty: 45 g, Refills: 1    Associated Diagnoses: Rash and nonspecific skin eruption      paliperidone ER (INVEGA) 9 MG 24 hr tablet Take 1 tablet (9 mg) by mouth At Bedtime  Qty: 30 tablet, Refills: 2    Associated Diagnoses: Schizoaffective disorder, depressive  type (H)      pantoprazole (PROTONIX) 40 MG EC tablet Take 1 tablet (40 mg) by mouth daily  Qty: 30 tablet, Refills: 3    Associated Diagnoses: Gastroesophageal reflux disease without esophagitis      senna-docusate (SENOKOT-S/PERICOLACE) 8.6-50 MG tablet Take 1 tablet by mouth 2 times daily as needed for constipation  Qty: 60 tablet, Refills: 10    Associated Diagnoses: Constipation, unspecified constipation type      topiramate (TOPAMAX) 200 MG tablet Take 1 tablet (200 mg) by mouth 2 times daily . Titrate to this dose as instructed in clinic. Note change in pill size.  Qty: 60 tablet, Refills: 6    Associated Diagnoses: Fibromyalgia; Chronic pain syndrome      traZODone (DESYREL) 100 MG tablet Take 1 tablet (100 mg) by mouth nightly as needed for sleep  Qty: 30 tablet, Refills: 3    Associated Diagnoses: Schizoaffective disorder, depressive type (H)      TRULICITY 1.5 MG/0.5ML pen Inject 1.5 mg Subcutaneous once a week Every Friday  Qty: 4 mL, Refills: 3    Associated Diagnoses: Type 2 diabetes mellitus with hyperglycemia, with long-term current use of insulin (H)      alcohol swab prep pads Use to swab area of injection/rodolfo as directed.  Qty: 100 each, Refills: 3    Associated Diagnoses: Type 2 diabetes mellitus with mild nonproliferative retinopathy without macular edema, with long-term current use of insulin, unspecified laterality (H)      blood glucose (NO BRAND SPECIFIED) test strip Use to test blood sugar 10 times daily or as directed.  Qty: 100 strip, Refills: 11    Associated Diagnoses: Type 2 diabetes mellitus with stage 3 chronic kidney disease, with long-term current use of insulin, unspecified whether stage 3a or 3b CKD (H)      blood glucose monitoring (NO BRAND SPECIFIED) meter device kit Use to test blood sugar 10 times daily or as directed.  Qty: 1 kit, Refills: 0    Associated Diagnoses: Type 2 diabetes mellitus with hyperglycemia, with long-term current use of insulin (H)      Continuous  Blood Gluc Sensor (FREESTYLE LEBRON 14 DAY SENSOR) [facility] Device every 14 days Change sensor as directed every 14 days  Qty: 2 each, Refills: 11    Associated Diagnoses: Type 2 diabetes mellitus with mild nonproliferative retinopathy without macular edema, with long-term current use of insulin, unspecified laterality (H)      insulin pen needle (NOVOFINE 30) 30G X 8 MM miscellaneous USE 4 DAILY OR AS DIRECTED  Qty: 400 each, Refills: 1    Associated Diagnoses: Type 2 diabetes mellitus with mild nonproliferative retinopathy without macular edema, with long-term current use of insulin, unspecified laterality (H)      !! order for DME Equipment being ordered: Depends.  Qty: 30 each, Refills: 4    Associated Diagnoses: Mixed incontinence      !! order for DME Equipment being ordered: CPAP Supplies.  Qty: 1 each, Refills: 0    Associated Diagnoses: CINDY (obstructive sleep apnea)      thin (NO BRAND SPECIFIED) lancets Use with lanceting device. To accompany: Blood Glucose Monitor Brands: per insurance.  Qty: 100 each, Refills: 6    Associated Diagnoses: Type 2 diabetes mellitus with mild nonproliferative retinopathy without macular edema, with long-term current use of insulin, unspecified laterality (H)       !! - Potential duplicate medications found. Please discuss with provider.        Allergies   Allergies   Allergen Reactions     Imidazole Antifungals Hives     Tolerates diflucan     Ketoprofen Itching     Pruritis to topical     Lisinopril Hives     Metformin Other (See Comments)     Patient hospitalized for lactic acidosis - admitting provider suspectd caused by metformin     Metronidazole Hives     Posaconazole Hives     Tolerates diflucan     Data   Most Recent 3 CBC's:Recent Labs   Lab Test 09/29/21  0831 09/28/21  1300 09/26/21  0803   WBC 5.7 6.7 5.2   HGB 9.4* 9.8* 9.4*   MCV 95 95 96    260 166      Most Recent 3 BMP's:  Recent Labs   Lab Test 09/29/21  0925 09/29/21  0831 09/28/21  2140 09/28/21  1729  09/28/21  1300 09/27/21  0818 09/27/21  0756   NA  --  139  --   --  135  --  135   POTASSIUM  --  3.9  --   --  4.0  --  4.2   CHLORIDE  --  111*  --   --  107  --  109   CO2  --  18*  --   --  18*  --  19*   BUN  --  15  --   --  20  --  19   CR  --  0.98  --   --  1.26*  --  1.06*   ANIONGAP  --  10  --   --  10  --  7   ALLEN  --  8.6  --   --  8.7  --  9.3   * 133* 163*   < > 247*   < > 178*    < > = values in this interval not displayed.     Most Recent 2 LFT's:  Recent Labs   Lab Test 09/26/21 0803 09/25/21 1913   AST 30 29   ALT 31 31   ALKPHOS 63 68   BILITOTAL 0.6 0.6     Most Recent 3 Troponin's:  Recent Labs   Lab Test 09/26/21  0803 09/25/21  1913 06/15/21  2155 10/04/20  1224 11/17/19  1355 01/06/15  0735 01/06/15  0423   TROPI  --   --  <0.015 <0.015 <0.015   < >  --    TROPONIN <0.015 <0.015  --   --   --   --  0.01    < > = values in this interval not displayed.     Most Recent TSH, T4 and A1c Labs:  Recent Labs   Lab Test 09/25/21 1913 06/30/20  1624 12/22/19  0651   TSH  --   --  1.23   A1C 8.1*   < >  --     < > = values in this interval not displayed.     Results for orders placed or performed during the hospital encounter of 09/25/21   XR Chest Port 1 View    Narrative    EXAM: XR CHEST PORT 1 VIEW  LOCATION: [facility]  DATE/TIME: 9/25/2021 7:39 PM    INDICATION: Dyspnea/SOB  COMPARISON: 07/15/2021      Impression    IMPRESSION: Mildly enlarged cardiac silhouette. Area of focally increased density in the left lateral lung zone, new from the comparison exam, suspicious for a small focus of pneumonia. Recommend six-week follow-up PA and lateral chest radiographs to   ensure resolution and exclude other pathology. No heart failure. No pneumothorax.     *Note: Due to a large number of results and/or encounters for the requested time period, some results have not been displayed. A complete set of results can be found in Results Review.

## 2021-09-30 ENCOUNTER — MEDICAL CORRESPONDENCE (OUTPATIENT)
Dept: HEALTH INFORMATION MANAGEMENT | Facility: CLINIC | Age: 61
End: 2021-09-30

## 2021-09-30 ENCOUNTER — TELEPHONE (OUTPATIENT)
Dept: FAMILY MEDICINE | Facility: CLINIC | Age: 61
End: 2021-09-30

## 2021-09-30 DIAGNOSIS — N18.30 TYPE 2 DIABETES MELLITUS WITH STAGE 3 CHRONIC KIDNEY DISEASE, WITH LONG-TERM CURRENT USE OF INSULIN, UNSPECIFIED WHETHER STAGE 3A OR 3B CKD (H): ICD-10-CM

## 2021-09-30 DIAGNOSIS — Z79.4 TYPE 2 DIABETES MELLITUS WITH STAGE 3 CHRONIC KIDNEY DISEASE, WITH LONG-TERM CURRENT USE OF INSULIN, UNSPECIFIED WHETHER STAGE 3A OR 3B CKD (H): ICD-10-CM

## 2021-09-30 DIAGNOSIS — N39.46 MIXED INCONTINENCE URGE AND STRESS (MALE)(FEMALE): Primary | ICD-10-CM

## 2021-09-30 DIAGNOSIS — E11.22 TYPE 2 DIABETES MELLITUS WITH STAGE 3 CHRONIC KIDNEY DISEASE, WITH LONG-TERM CURRENT USE OF INSULIN, UNSPECIFIED WHETHER STAGE 3A OR 3B CKD (H): ICD-10-CM

## 2021-09-30 NOTE — TELEPHONE ENCOUNTER
Rowena,     Please see note below.   I cued chux and test strips.     Thanks,  DAVIN Young  [facility]

## 2021-09-30 NOTE — TELEPHONE ENCOUNTER
Edinson from [facility] calling, phone [phone] (Pt has appt on 10/28 with both Rowena and Eugenia.)    Pt was brought back yesterday evening from hospital. She was there for 3 1/2 days. Some meds were changed on her return    Her insulin was changed - insulin listed on med list is current as of discharge on 9/29    1.Her blood sugars are running on the higher end. Still has lack of appetite from the Covid   this morning fasting  Last night prn BS was 294    Staff doing a lot of fingers pricks. Trouble with Gabby sensor , this is the third device which is giving false readings. It will read 45 when the finger prick shows in the 90s.  Another comparison  -sensor read 120 and finger prick read 196     2.They need more test strips, testing 8-10 daily ,the rx written [id number] will not be enough for one month  Pt has appt on 10/28 with both Rowena and Eugenia.    3. .Also some issues with her CPAP - compliance issue. Need new orders for the CPAP machine .. The hospital said she needs to use it    4. Needs orders for Chux pads, lots of incontinence. They want the printed order and they would take to medical supply company unless you want to e scribe somewhere that you advise for med supplies    Rosina Rao RN, BSN  [facility]

## 2021-09-30 NOTE — TELEPHONE ENCOUNTER
Reason for Call:  Form, our goal is to have forms completed with 72 hours, however, some forms may require a visit or additional information.    Type of letter, form or note:  Home Health Certification-plan of care (05/01/21-06/29/21)    Who is the form from?: Home care[facility]     Where did the form come from: form was faxed in    What clinic location was the form placed at?: [facility]    Where the form was placed: [employer]  Box/Folder    What number is listed as a contact on the form?:   PHONE: [phone]  FAX: [phone]       Additional comments: Please review, sign, date and fax back to the number listed above.     Call taken on 9/30/2021 at 10:05 AM by Kristie Montana

## 2021-09-30 NOTE — TELEPHONE ENCOUNTER
Reason for Call:  Form, our goal is to have forms completed with 72 hours, however, some forms may require a visit or additional information.    Type of letter, form or note:  Home Health Certification-Plan of care (06/30/21-08/28/21)    Who is the form from?: Home care-[facility] Health Care [facility]    Where did the form come from: form was faxed in    What clinic location was the form placed at?: [facility]    Where the form was placed: [facility]    What number is listed as a contact on the form?:   PHONE: [phone]  FAX:[phone]       Additional comments: Please review, sign, date and fax back to the number listed above     Call taken on 9/30/2021 at 9:56 AM by Kristie Montana

## 2021-09-30 NOTE — LETTER
October 1, 2021      Nena Tang  [address] [place] [address]        To Whom It May Concern,       This patient is under my medical care.    OK for staff to assist patient in application and monitoring of CPAP machine at night.      Sincerely,        ART Fay CNP

## 2021-10-01 LAB
BACTERIA BLD CULT: NO GROWTH
BACTERIA BLD CULT: NO GROWTH

## 2021-10-01 NOTE — TELEPHONE ENCOUNTER
Letter faxed to [facility] # [phone]    Crissy Pulido RN  [facility]

## 2021-10-01 NOTE — TELEPHONE ENCOUNTER
Faxed to [facility] [phone] & copy sent to scan.    Suzy JETT

## 2021-10-01 NOTE — TELEPHONE ENCOUNTER
Attempted to call Amal, left NDVM to call back and ask to speak with nurse.     Print out of DME on my desk. Can  or can fax/email.    Thanks,  DAVIN Young  [facility]

## 2021-10-01 NOTE — TELEPHONE ENCOUNTER
Edinson Guaman called back - notified of message.    Edinson asking about CPAP orders as well - after review of chart, I saw that pt was given sleep study referral upon discharge from [facility], Edinson given scheduling information. Advised Edinson that for specific orders containing CPAP settings, etc. They should first get sleep study done.    However, Edinson is requesting order from provider to assist pt in applying and monitoring CPAP until they can get sleep study done  - letter cued if agree.    Crissy Pulido RN  [facility]

## 2021-10-01 NOTE — TELEPHONE ENCOUNTER
1. The need to only check 3-4 times per day if not using livia until our appointment. There is refills on file for this.   2. OK to stop using sensor until seem by MTM for training.   3. Orders reviewed and signed.

## 2021-10-06 ENCOUNTER — VIRTUAL VISIT (OUTPATIENT)
Dept: PHARMACY | Facility: CLINIC | Age: 61
End: 2021-10-06
Payer: COMMERCIAL

## 2021-10-06 DIAGNOSIS — U07.1 2019 NOVEL CORONAVIRUS DISEASE (COVID-19): ICD-10-CM

## 2021-10-06 DIAGNOSIS — R23.8 SKIN IRRITATION: Primary | ICD-10-CM

## 2021-10-06 DIAGNOSIS — G89.29 CHRONIC PAIN OF BOTH SHOULDERS: ICD-10-CM

## 2021-10-06 DIAGNOSIS — M25.512 CHRONIC PAIN OF BOTH SHOULDERS: ICD-10-CM

## 2021-10-06 DIAGNOSIS — E11.3299 TYPE 2 DIABETES MELLITUS WITH MILD NONPROLIFERATIVE RETINOPATHY WITHOUT MACULAR EDEMA, WITH LONG-TERM CURRENT USE OF INSULIN, UNSPECIFIED LATERALITY (H): ICD-10-CM

## 2021-10-06 DIAGNOSIS — M25.511 CHRONIC PAIN OF BOTH SHOULDERS: ICD-10-CM

## 2021-10-06 DIAGNOSIS — Z79.4 TYPE 2 DIABETES MELLITUS WITH MILD NONPROLIFERATIVE RETINOPATHY WITHOUT MACULAR EDEMA, WITH LONG-TERM CURRENT USE OF INSULIN, UNSPECIFIED LATERALITY (H): ICD-10-CM

## 2021-10-06 PROCEDURE — 99607 MTMS BY PHARM ADDL 15 MIN: CPT | Performed by: PHARMACIST

## 2021-10-06 PROCEDURE — 99606 MTMS BY PHARM EST 15 MIN: CPT | Performed by: PHARMACIST

## 2021-10-06 NOTE — PROGRESS NOTES
Medication Therapy Management (MTM) Encounter    ASSESSMENT:                            Medication Adherence/Access: No issues identified    Type 2 Diabetes: Patient is not meeting A1c goal of < 7%. Self monitoring of blood glucose is not at goal of fasting  mg/dL and post prandial < 150 mg/dL. Sugars are elevated primarily after meals, it would be beneficial to increase Novolog.   Education provided to RN regarding potential for error in Gabby CGM and when to test blood with other glucometer.    Pain: OK to schedule bedtime dose of acetaminophen     Incontinence/rash: OK to use barrier cream, zinc oxide 20% ointment three times daily.    Pneumonia/Covid-19 SOB:  Stable.     PLAN:                            1. Increase Novolog to 6 units 3 times a day  2. Schedule acetaminophen 650mg at bedtime  3. Start Zinc oxide ointment 3 times a day as needed      Follow-up: Return in about 2 weeks (around 10/20/2021) for MTM covisit.      SUBJECTIVE/OBJECTIVE:                          Nena Tang is a 61 year old female called for a follow-up visit. She was referred to me from Rowena Haas. Today's visit is a follow-up MTM visit from 8/24/21. Spoke with Nena's nurse, Edinson for today's visit. Patient was admitted to [facility] for Pneumonia and poor oral intake related to Covid-19 and discharged on 9/29/21.     Reason for visit: diabetes recheck.    Allergies/ADRs: Reviewed in chart  Past Medical History: Reviewed in chart  Tobacco: She reports that she has never smoked. She has never used smokeless tobacco.  Alcohol: not currently using  Social: currently living at [facility]. Requiring 2-hands-assist likely related to Covid. She is living upstairs, but will need to move her downstairs.   Diet: she is eating whatever she wants to eat but also has days where she isn't eating at all      Medication Adherence/Access: no issues reported. retirement is managing her medications and administering her insulin.  Receives medications from [facility].     Type 2 Diabetes:  Currently taking 3 units of Novolog with three meals daily and 15 units Lantus daily in AM and PM. Cannot calibrate the error in Freestyle Gabby and is causing confusion for nursing staff.  Patient is not experiencing side effects.  Blood sugar monitoring: Continuous Glucose Monitor. Ranges (based on logbook-faxed by staff): See below    Date FBG/ 2 hours post Lunch / 2hours post Dinner / 2hours post   10/1 109 @ 0951 / 163 @ 1058  -- / 229 @ 1426 230 @ 2039 / 279 @ 2206   10/2 209 @ 0939 / 179 @1126  -- / 249 @1454  199 @ 1854 / 249 @ 2149   10/3 186 @ 0915 / 228 @ 1113  -- / 250 @ 1531 365 @ 1831 / 280 @ 2152   10/4 158 @ 1029 / --  -- / 192 @ 1538 270 @ 1912 / 222 @ 2126   10/5 240 @ 0910 / 239 @ 1215  -- / 259 @1530 370 @ 2104 / 315 @ 2321   10/6 270 @ 1055             Symptoms of low blood sugar? none  Symptoms of high blood sugar? None/unknown   Eye exam: due  Foot exam: up to date  Aspirin: Taking 81mg daily   Statin: Yes: atorvastatin   ACEi/ARB: Yes: losartan.   Urine Albumin:   Lab Results   Component Value Date    UMALCR 9.40 07/27/2021      Lab Results   Component Value Date    A1C 8.1 09/25/2021    A1C 10.0 08/10/2021    A1C 9.5 07/27/2021    A1C 9.1 12/01/2020    A1C 9.7 09/15/2020    A1C 8.6 06/30/2020    A1C 6.2 12/03/2019    A1C 6.6 08/06/2019       Pain: Reports patient has been taking acetaminophen 325mg two tablets daily at bedtime and as needed throughout the day. This is adequately controlling pain. Requests scheduling acetaminophen at bedtime for simplicity in [place] [facility].     Incontinence/rash: Current medications: none. Patient has been having more frequent stool incontinence.  Patient has small rash developing from frequent toileting, requests barrier cream.    Pneumonia/Covid-19 SOB: Current medications albuterol 108mcg/act two puffs 4 times daily as needed. Reports taking 2 puffs every 4 hours. Effectively working to  reduce shortness of breath.   Also taking apixaban 2.5mg twice daily x30 days. Monitoring for bleeding and no issues currently present.       Today's Vitals: LMP 01/06/2015 (Exact Date)   ----------------  Post Discharge Medication Reconciliation Status: discharge medications reconciled and changed, per note/orders.    I spent 30 minutes with this patient today. All changes were made via collaborative practice agreement with ART Fay CNP. A copy of the visit note was provided to the patient's primary care provider.    The patient was faxed a summary of these recommendations.     Eugenia De Jesus, PharmD, BCACP     [facility]  [facility]       Telemedicine Visit Details  Type of service:  Telephone visit  Start Time: 11am  End Time: 11:30am  Originating Location (patient location): [place]  Distant Location (provider location):  [facility]     Medication Therapy Recommendations  No medication therapy recommendations to display

## 2021-10-06 NOTE — PROGRESS NOTES
Fax [phone]    3u N with meals  15u BID with Lantus    Scheduled apap 650mg at bedtime    Barrier cream-     Albuterol QID, has been helpful    topiramate 200mg once a day

## 2021-10-07 ENCOUNTER — MEDICAL CORRESPONDENCE (OUTPATIENT)
Dept: HEALTH INFORMATION MANAGEMENT | Facility: CLINIC | Age: 61
End: 2021-10-07

## 2021-10-07 DIAGNOSIS — Z53.9 DIAGNOSIS NOT YET DEFINED: Primary | ICD-10-CM

## 2021-10-07 DIAGNOSIS — N39.498 OTHER URINARY INCONTINENCE: Primary | ICD-10-CM

## 2021-10-07 RX ORDER — INSULIN ASPART 100 [IU]/ML
6 INJECTION, SOLUTION INTRAVENOUS; SUBCUTANEOUS
Qty: 30 ML | Refills: 1 | Status: SHIPPED | OUTPATIENT
Start: 2021-10-07 | End: 2022-02-23

## 2021-10-07 RX ORDER — ZINC OXIDE 20 %
OINTMENT (GRAM) TOPICAL 3 TIMES DAILY PRN
Qty: 60 G | Refills: 3 | Status: SHIPPED | OUTPATIENT
Start: 2021-10-07

## 2021-10-07 RX ORDER — ACETAMINOPHEN 325 MG/1
TABLET ORAL
Qty: 200 TABLET | Refills: 3 | Status: SHIPPED | OUTPATIENT
Start: 2021-10-07 | End: 2022-09-20

## 2021-10-11 ENCOUNTER — MEDICAL CORRESPONDENCE (OUTPATIENT)
Dept: HEALTH INFORMATION MANAGEMENT | Facility: CLINIC | Age: 61
End: 2021-10-11

## 2021-10-14 ENCOUNTER — TELEPHONE (OUTPATIENT)
Dept: FAMILY MEDICINE | Facility: CLINIC | Age: 61
End: 2021-10-14

## 2021-10-14 DIAGNOSIS — F25.1 SCHIZOAFFECTIVE DISORDER, DEPRESSIVE TYPE (H): ICD-10-CM

## 2021-10-14 DIAGNOSIS — M54.50 CHRONIC RIGHT-SIDED LOW BACK PAIN WITHOUT SCIATICA: ICD-10-CM

## 2021-10-14 DIAGNOSIS — G89.29 CHRONIC RIGHT-SIDED LOW BACK PAIN WITHOUT SCIATICA: ICD-10-CM

## 2021-10-14 NOTE — TELEPHONE ENCOUNTER
Requested Prescriptions   Pending Prescriptions Disp Refills     gabapentin (NEURONTIN) 300 MG capsule 30 capsule 3     Sig: Take 1 capsule (300 mg) by mouth At Bedtime       There is no refill protocol information for this order        Routing refill request to provider for review/approval because:  Drug not on the [facility] refill protocol

## 2021-10-14 NOTE — TELEPHONE ENCOUNTER
Reason for Call:  Medication or medication refill:    Do you use a [facility]?  Name of the pharmacy and phone number for the current request:  [id number] - [place] [address]    Name of the medication requested: gabapentin (NEURONTIN) 300 MG capsule    Other request: Call pt back for any further questions or concerns regarding this request.     Can we leave a detailed message on this number? YES    Phone number patient can be reached at: Home number on file [phone] (home)    Best Time: Any     Call taken on 10/14/2021 at 3:58 PM by Maria Elena Olsen

## 2021-10-14 NOTE — TELEPHONE ENCOUNTER
Forms completed and faxed back to [facility] @ [phone]. Placed into [employer] to scan into patients chart.   Michael Gustafson MA

## 2021-10-18 ENCOUNTER — MEDICAL CORRESPONDENCE (OUTPATIENT)
Dept: HEALTH INFORMATION MANAGEMENT | Facility: CLINIC | Age: 61
End: 2021-10-18
Payer: COMMERCIAL

## 2021-10-18 DIAGNOSIS — U07.1 2019 NOVEL CORONAVIRUS DISEASE (COVID-19): ICD-10-CM

## 2021-10-18 RX ORDER — GABAPENTIN 300 MG/1
300 CAPSULE ORAL AT BEDTIME
Qty: 30 CAPSULE | Refills: 3 | Status: SHIPPED | OUTPATIENT
Start: 2021-10-18 | End: 2022-01-26

## 2021-10-18 NOTE — TELEPHONE ENCOUNTER
Requested Prescriptions   Signed Prescriptions Disp Refills    apixaban ANTICOAGULANT (ELIQUIS) 2.5 MG tablet 60 tablet 0     Sig: Take 1 tablet (2.5 mg) by mouth 2 times daily       Direct Oral Anticoagulant Agents Passed - 10/18/2021  1:15 PM        Passed - Normal Platelets on file in past 12 months     Recent Labs   Lab Test 09/29/21  0831                  Passed - Medication is active on med list        Passed - Patient is 18-79 years of age        Passed - Serum creatinine less than or equal to 1.4 on file in past 12 mos     Recent Labs   Lab Test 09/29/21  0831 05/16/19  0658 05/15/19  1834   CR 0.98   < >  --    CREAT  --   --  0.9    < > = values in this interval not displayed.       Ok to refill medication if creatinine is low          Passed - Weight is greater than 60 kg for the past year     Wt Readings from Last 3 Encounters:   09/25/21 95.3 kg (210 lb)   08/24/21 95.3 kg (210 lb)   08/24/21 95.3 kg (210 lb)             Passed - No active pregnancy on record        Passed - No positive pregnancy test within past 12 months        Passed - Recent (6 mo) or future (30 days) visit within the authorizing provider's specialty           Thanks,  DAVIN Young  [facility]

## 2021-10-19 ENCOUNTER — TELEPHONE (OUTPATIENT)
Dept: FAMILY MEDICINE | Facility: CLINIC | Age: 61
End: 2021-10-19
Payer: COMMERCIAL

## 2021-10-19 DIAGNOSIS — M79.7 FIBROMYALGIA: ICD-10-CM

## 2021-10-19 DIAGNOSIS — G89.4 CHRONIC PAIN SYNDROME: ICD-10-CM

## 2021-10-19 RX ORDER — TOPIRAMATE 200 MG/1
200 TABLET, FILM COATED ORAL DAILY
Qty: 30 TABLET | Refills: 3 | Status: SHIPPED | OUTPATIENT
Start: 2021-10-19 | End: 2022-01-26

## 2021-10-19 NOTE — TELEPHONE ENCOUNTER
Reviewed information available from move in orders- show once daily dosing- we previously made plans to increase to BID, but she must not have done that. Unable to reach via phone.    Shell Vaca MD  [facility]       Signed Prescriptions:                        Disp   Refills    topiramate (TOPAMAX) 200 MG tablet         30 tab*3        Sig: Take 1 tablet (200 mg) by mouth daily  Authorizing Provider: CODEY VACA

## 2021-10-19 NOTE — TELEPHONE ENCOUNTER
"Requested Prescriptions   Pending Prescriptions Disp Refills     topiramate (TOPAMAX) 200 MG tablet 60 tablet 6     Sig: Take 1 tablet (200 mg) by mouth 2 times daily . Titrate to this dose as instructed in clinic. Note change in pill size.       Anti-Seizure Meds Protocol  Failed - 10/19/2021 11:15 AM        Failed - Review Authorizing provider's last note.      Refer to last progress notes: confirm request is for original authorizing provider (cannot be through other providers).          Failed - Normal CBC on file in past 26 months     Recent Labs   Lab Test 09/29/21  0831   WBC 5.7   RBC 3.04*   HGB 9.4*   HCT 28.8*                    Passed - Recent (12 mo) or future (30 days) visit within the authorizing provider's specialty     Patient has had an office visit with the authorizing provider or a provider within the authorizing providers department within the previous 12 mos or has a future within next 30 days. See \"Patient Info\" tab in inbasket, or \"Choose Columns\" in Meds & Orders section of the refill encounter.              Passed - Normal ALT or AST on file in past 26 months     Recent Labs   Lab Test 09/26/21  0803   ALT 31     Recent Labs   Lab Test 09/26/21  0803   AST 30             Passed - Normal platelet count on file in past 26 months     Recent Labs   Lab Test 09/29/21  0831                  Passed - Medication is active on med list        Passed - No active pregnancy on record        Passed - No positive pregnancy test in last 12 months           Routing refill request to provider for review/approval because:  SEE NOTE BELOW - CLARIFICATION ON DIRECTIONS PLEASE        "

## 2021-10-19 NOTE — TELEPHONE ENCOUNTER
Per pharmacy   We are requesting a new rx for topiramate (TOPAMAX) 200 MG tablet with updated instructions of 1 tablet daily evening. We currently have directions from another physician for 1 tablet BID. [facility] states she is taking 1 tablet daily

## 2021-10-19 NOTE — TELEPHONE ENCOUNTER
Prior Authorization Retail Medication Request    Medication/Dose: hydrOXYzine (VISTARIL) 25 MG capsule  ICD code (if different than what is on RX):    Previously Tried and Failed:    Rationale:      Ins[facility]  Insurance ID:  [id number]      Pharmacy Information (if different than what is on RX)  Name:  [facility]  Phone:  [phone]

## 2021-10-21 ENCOUNTER — MEDICAL CORRESPONDENCE (OUTPATIENT)
Dept: HEALTH INFORMATION MANAGEMENT | Facility: CLINIC | Age: 61
End: 2021-10-21
Payer: COMMERCIAL

## 2021-10-21 NOTE — TELEPHONE ENCOUNTER
Central Prior Authorization Team   Phone: [phone]      PA Initiation    Medication: hydrOXYzine (VISTARIL) 25 MG capsule - INITIATED  Insurance Company: [employer] - Phone [phone] Fax [phone]  [id number] [place] [address]  Filling Pharmacy Phone: [phone]  Filling Pharmacy Fax: [phone]  Start Date: 10/21/2021

## 2021-10-22 NOTE — TELEPHONE ENCOUNTER
Prior Authorization Approval    Authorization Effective Date: 9/21/2021  Authorization Expiration Date: 10/21/2022  Medication: hydrOXYzine (VISTARIL) 25 MG capsule - approved  Approved Dose/Quantity: 60 for 10 days  Insurance Company: [employer] - Phone [phone] Fax [phone]  Which Pharmacy is filling the prescription (Not needed for infusion/clinic administered): [id number] [place] [address]  Pharmacy Notified: Yes  Patient Notified: Yes Pharmacy will notify patient once order is ready.

## 2021-10-25 ENCOUNTER — TELEPHONE (OUTPATIENT)
Dept: FAMILY MEDICINE | Facility: CLINIC | Age: 61
End: 2021-10-25

## 2021-10-25 DIAGNOSIS — F25.0 SCHIZOAFFECTIVE DISORDER, BIPOLAR TYPE (H): ICD-10-CM

## 2021-10-25 RX ORDER — CLONAZEPAM 0.5 MG/1
0.5 TABLET ORAL AT BEDTIME
Qty: 30 TABLET | Refills: 0 | Status: SHIPPED | OUTPATIENT
Start: 2021-10-25 | End: 2021-11-17

## 2021-10-25 NOTE — TELEPHONE ENCOUNTER
Pharmacist calling in requesting refill of Klonopin.   T'd up. Patient will be out tomorrow.    Kenzie Salazar RN

## 2021-10-25 NOTE — TELEPHONE ENCOUNTER
Rowena,    Please see message below.    Requested Prescriptions   Pending Prescriptions Disp Refills     clonazePAM (KLONOPIN) 0.5 MG tablet 30 tablet 0     Sig: Take 1 tablet (0.5 mg) by mouth At Bedtime       There is no refill protocol information for this order        Routing refill request to provider for review/approval because:  Drug not on the [facility] refill protocol     Crissy Pulido RN  [facility]

## 2021-10-25 NOTE — PROGRESS NOTES
Care Transitions note  Received a call today from Edinson, group home nurse at [facility] [phone].  She reports since patient returned from the hospital on 9/29/21, her care needs have increased from her baseline and Edinson has requested increased [employer] funding.  The Alesha RENO wants documentation from the hospital indicating her care needs.  Writer did print off the PT assessment from 9/28 and faxed it to Edinson at [phone].

## 2021-10-27 ENCOUNTER — MEDICAL CORRESPONDENCE (OUTPATIENT)
Dept: HEALTH INFORMATION MANAGEMENT | Facility: CLINIC | Age: 61
End: 2021-10-27
Payer: COMMERCIAL

## 2021-10-27 NOTE — PROGRESS NOTES
Assessment & Plan     Hip pain, right  Chronic pain syndrome  Worsening right hip pain. Discussed getting an X-ray and following up with pain medicine as recommended.   - Continue with physical therapy.   - XR Pelvis and Hip Bilateral 2 Views; Future  - Physical Therapy Referral; Future    Type 2 diabetes mellitus with hyperglycemia, with long-term current use of insulin (H)  Inadequately controlled. Increase Lantus to 17 units daily.   - Call and schedule diabetic eye exam, referral placed.   - Adult Eye Referral; Future    Schizoaffective disorder, bipolar type (H)  Stable with current medications. Patient is requesting to follow-up with Dr. Brothers for psychiatry care.     2019 novel coronavirus disease (COVID-19)  Improving after her hospital stay for the infection. Continue to monitor.       Ordering of each unique test  Prescription drug management  I spent a total of 43 minutes on the day of the visit.   Time spent doing chart review, history and exam, documentation and further activities per the note       Patient Instructions   - Increase Lantus to 15 mg twice per day.   - Discontinue Eliquis.   - Eye referral placed.   - Schedule with Dr. Brothers (Psychiatry)  Call clinic with any questions or concerns.         No follow-ups on file.    ART Fay CNP  [facility]    Surjit Barrett is a 61 year old who presents for the following health issues  accompanied by her nurse.     HPI     Covid-19: Patient reports that she feel better since our last meeting and after her hospitalization.   Patient's nurse is available 3 times per week and also as needed.       Diabetes Follow-up  Patient reports that she has been eating well and denies any symptoms for low blood sugars. Reports a few highs in the 400's. Request to switch to Dexcom per staffing.   How often are you checking your blood sugar? Continuous glucose monitor    What symptoms do you notice when your blood sugar  "is low?  None    What concerns do you have today about your diabetes? None    Have you had a diabetic eye exam in the last 12 months? No Due, referral placed.    BP Readings from Last 2 Encounters:   09/29/21 126/70   09/21/21 120/60     Hemoglobin A1C (%)   Date Value   09/25/2021 8.1 (H)   08/10/2021 10.0 (H)   12/01/2020 9.1 (H)   09/15/2020 9.7 (H)     LDL Cholesterol Calculated (mg/dL)   Date Value   12/01/2020 141 (H)   12/22/2019 86       Sleep Apnea: States that she is still having issues with her CPAP and the mask has not been working out well for her. Information provided to the nursing staff from her referral to call and schedule with the sleep clinic to get her CPAP assessed. Patient notes that she has been trying to use this more often.     Right Hip Pain: states that the hip has been bothering her all the time. Currently scheduled with the Pain clinic- Dr. Paz for a follow-up appointment.   Currently doing Physical and Occupational therapy. Would like to continue with PT for the hip pain.     Mental Health: Patient reports that the people that at the home have been very persistent with her care and she feels like they are \"too much\" although she notes this lovingly. The staff has been calling when she is at the drop in center to confirm that she is using her insulin with her meals.     Review of Systems   Constitutional, HEENT, cardiovascular, pulmonary, gi and gu systems are negative, except as otherwise noted.      Objective    /74   Pulse 86   Temp 97.9  F (36.6  C)   Ht 1.562 m (5' 1.5\")   Wt 96.6 kg (213 lb)   LMP 01/06/2015 (Exact Date)   SpO2 95%   BMI 39.59 kg/m    Body mass index is 39.59 kg/m .     Physical Exam   GENERAL: healthy, alert and no distress  EYES: Eyes grossly normal to inspection, PERRL and conjunctivae and sclerae normal  RESP: lungs clear to auscultation - no rales, rhonchi or wheezes  BREAST: normal without masses, tenderness or nipple discharge and no " palpable axillary masses or adenopathy  CV: regular rate and rhythm, normal S1 S2, no S3 or S4, no murmur, click or rub, no peripheral edema and peripheral pulses strong  ABDOMEN: soft, nontender, no hepatosplenomegaly, no masses and bowel sounds normal  MS: no gross musculoskeletal defects noted, no edema  SKIN: no suspicious lesions or rashes  NEURO: Normal strength and tone, mentation intact and speech normal  PSYCH: mentation appears normal, affect normal/bright    Labs Reviewed.

## 2021-10-27 NOTE — PROGRESS NOTES
Medication Therapy Management (MTM) Encounter    ASSESSMENT:                            Medication Adherence/Access: No issues identified    Type 2 Diabetes: Patient is not meeting A1c goal of <7%. Self monitoring of blood glucose is not at fasting goal of 80-130mg/dL and post prandial goal of <180mg/dL. Elevated sugars typically occur around noon. It would be beneficial to increase Lantus dose to bring fasting and post-prandial sugars into range.  OK to send Dexcom Rx to see if insurance will cover - patient had previously received a Dexcom 2-3 years ago and was unable to use it, unfortunately no longer has it.    COVID-19: Stable. OK to stop 30-day course of Eliquis today. Appears to have been inadvertently refilled by RN, can disregard.    Sleep apnea: see PCP notes for recommendations    PLAN:                            1. Increase Lantus to 17U twice daily. Continue Novolog unchanged.   2. Discontinue Eliquis today.  3. Sent Rx for Dexcom G6 to see if covered by insurance    Follow-up: 1 month (11/30/2021) for MTM covisit.    SUBJECTIVE/OBJECTIVE:                          Nena Tang is a 61 year old female coming in for a transitions of care visit. She was discharged from [facility] on 09/29 for COVID-19 pneumonia. Today's visit is a co-visit with Rowena Haas and patient's nurse.     Reason for visit: diabetes recheck.    Allergies/ADRs: Reviewed in chart  Past Medical History: Reviewed in chart  Tobacco: She reports that she has never smoked. She has never used smokeless tobacco.  Alcohol: not currently using      Medication Adherence/Access: no issues reported. skilled nursing is managing her medications and administering her insulin. Receives medications from [facility].    Type 2 Diabetes:  Currently taking Novolog 6U three times daily with meals and Lantus 15U twice daily. Patient is not experiencing side effects.  Blood sugar monitoring: Continuous Glucose Monitor. RN asking if patient  "could change CGM from Gabby to Dexcom because likes ability to calibrate glucose readings. Group home willing to assist patient with management of either device.  Ranges (from glucometer): See below  Symptoms of low blood sugar? none  Symptoms of high blood sugar? None; fatigue improved  Eye exam: due  Foot exam: up to date  Diet/Exercise: reports eating mostly salads; going for walks in the house  Aspirin: Taking 81mg daily   Statin: Yes: atorvastatin   ACEi/ARB: Yes: losartan  Urine Albumin:   Lab Results   Component Value Date    UMALCR 9.40 07/27/2021      Lab Results   Component Value Date    A1C 8.1 09/25/2021    A1C 10.0 08/10/2021    A1C 9.5 07/27/2021    A1C 9.1 12/01/2020    A1C 9.7 09/15/2020    A1C 8.6 06/30/2020    A1C 6.2 12/03/2019    A1C 6.6 08/06/2019           Sleep apnea: Nurse says CPAP machine is uncomfortable for the patient, and she has trouble keeping the mask on. Nurse thinks patient could benefit from a sleep study to adjust the settings. Patient says her sleep is okay but agrees that the mask is too small and that there's something \"off\" about the settings.     COVID-19: Patient was hospitalized for COVID-19 pneumonia for a couple days. She reports feeling better but still has some cough and difficulty breathing. Says her legs are weak and continues using walker. Still taking Eliquis without problems.   Per hospital discharge summary 9/29/21:    Subcutaneous Lovenox for Pharmacological DVT prophylaxis while in the hospital, will switch to low dose Eliquis upon discharge for 30 days.      Today's Vitals: LMP 01/06/2015 (Exact Date)    BP Readings from Last 1 Encounters:   10/28/21 133/74     Pulse Readings from Last 1 Encounters:   10/28/21 86     Wt Readings from Last 1 Encounters:   10/28/21 213 lb (96.6 kg)     Ht Readings from Last 1 Encounters:   10/28/21 5' 1.5\" (1.562 m)     Estimated body mass index is 39.59 kg/m  as calculated from the following:    Height as of an earlier " "encounter on 10/28/21: 5' 1.5\" (1.562 m).    Weight as of an earlier encounter on 10/28/21: 213 lb (96.6 kg).    Temp Readings from Last 1 Encounters:   10/28/21 97.9  F (36.6  C)      ----------------  Post Discharge Medication Reconciliation Status: discharge medications reconciled and changed, per note/orders.    I spent 45 minutes with this patient today. All changes were made via collaborative practice agreement with ART Fay CNP. A copy of the visit note was provided to the patient's primary care provider.    The patient was given a summary of these recommendations. See Provider note/AVS from today.      Medication Therapy Recommendations  No medication therapy recommendations to display     "

## 2021-10-28 ENCOUNTER — OFFICE VISIT (OUTPATIENT)
Dept: FAMILY MEDICINE | Facility: CLINIC | Age: 61
End: 2021-10-28
Payer: COMMERCIAL

## 2021-10-28 ENCOUNTER — OFFICE VISIT (OUTPATIENT)
Dept: PHARMACY | Facility: CLINIC | Age: 61
End: 2021-10-28
Payer: COMMERCIAL

## 2021-10-28 VITALS
BODY MASS INDEX: 39.2 KG/M2 | OXYGEN SATURATION: 95 % | SYSTOLIC BLOOD PRESSURE: 133 MMHG | DIASTOLIC BLOOD PRESSURE: 74 MMHG | WEIGHT: 213 LBS | HEART RATE: 86 BPM | TEMPERATURE: 97.9 F | HEIGHT: 62 IN

## 2021-10-28 DIAGNOSIS — U07.1 2019 NOVEL CORONAVIRUS DISEASE (COVID-19): ICD-10-CM

## 2021-10-28 DIAGNOSIS — G47.33 OSA (OBSTRUCTIVE SLEEP APNEA): ICD-10-CM

## 2021-10-28 DIAGNOSIS — Z79.4 TYPE 2 DIABETES MELLITUS WITH MILD NONPROLIFERATIVE RETINOPATHY WITHOUT MACULAR EDEMA, WITH LONG-TERM CURRENT USE OF INSULIN, UNSPECIFIED LATERALITY (H): Primary | ICD-10-CM

## 2021-10-28 DIAGNOSIS — M25.551 HIP PAIN, RIGHT: Primary | ICD-10-CM

## 2021-10-28 DIAGNOSIS — E11.3299 TYPE 2 DIABETES MELLITUS WITH MILD NONPROLIFERATIVE RETINOPATHY WITHOUT MACULAR EDEMA, WITH LONG-TERM CURRENT USE OF INSULIN, UNSPECIFIED LATERALITY (H): Primary | ICD-10-CM

## 2021-10-28 DIAGNOSIS — G89.4 CHRONIC PAIN SYNDROME: ICD-10-CM

## 2021-10-28 DIAGNOSIS — Z79.4 TYPE 2 DIABETES MELLITUS WITH HYPERGLYCEMIA, WITH LONG-TERM CURRENT USE OF INSULIN (H): ICD-10-CM

## 2021-10-28 DIAGNOSIS — F25.0 SCHIZOAFFECTIVE DISORDER, BIPOLAR TYPE (H): ICD-10-CM

## 2021-10-28 DIAGNOSIS — E11.65 TYPE 2 DIABETES MELLITUS WITH HYPERGLYCEMIA, WITH LONG-TERM CURRENT USE OF INSULIN (H): ICD-10-CM

## 2021-10-28 PROCEDURE — 99606 MTMS BY PHARM EST 15 MIN: CPT | Performed by: PHARMACIST

## 2021-10-28 PROCEDURE — 99607 MTMS BY PHARM ADDL 15 MIN: CPT | Performed by: PHARMACIST

## 2021-10-28 PROCEDURE — 99215 OFFICE O/P EST HI 40 MIN: CPT | Performed by: NURSE PRACTITIONER

## 2021-10-28 RX ORDER — PROCHLORPERAZINE 25 MG/1
SUPPOSITORY RECTAL
Qty: 1 EACH | Refills: 3 | Status: SHIPPED | OUTPATIENT
Start: 2021-10-28 | End: 2022-10-21

## 2021-10-28 RX ORDER — PROCHLORPERAZINE 25 MG/1
1 SUPPOSITORY RECTAL ONCE
Qty: 1 EACH | Refills: 0 | Status: SHIPPED | OUTPATIENT
Start: 2021-10-28 | End: 2021-10-28

## 2021-10-28 RX ORDER — PROCHLORPERAZINE 25 MG/1
SUPPOSITORY RECTAL
Qty: 3 EACH | Refills: 11 | Status: SHIPPED | OUTPATIENT
Start: 2021-10-28 | End: 2022-10-21

## 2021-10-28 ASSESSMENT — MIFFLIN-ST. JEOR: SCORE: 1481.47

## 2021-10-28 NOTE — PATIENT INSTRUCTIONS
- Increase Lantus to 15 mg twice per day.   - Discontinue Eliquis.   - Eye referral placed.   - Schedule with Dr. Brothers (Psychiatry)  Call clinic with any questions or concerns.

## 2021-11-02 ENCOUNTER — OFFICE VISIT (OUTPATIENT)
Dept: PALLIATIVE MEDICINE | Facility: CLINIC | Age: 61
End: 2021-11-02
Payer: COMMERCIAL

## 2021-11-02 VITALS — SYSTOLIC BLOOD PRESSURE: 153 MMHG | HEART RATE: 80 BPM | DIASTOLIC BLOOD PRESSURE: 87 MMHG | OXYGEN SATURATION: 96 %

## 2021-11-02 DIAGNOSIS — M25.551 HIP PAIN, RIGHT: Primary | ICD-10-CM

## 2021-11-02 DIAGNOSIS — M47.896 OTHER SPONDYLOSIS, LUMBAR REGION: ICD-10-CM

## 2021-11-02 DIAGNOSIS — M53.3 SI (SACROILIAC) JOINT DYSFUNCTION: ICD-10-CM

## 2021-11-02 PROCEDURE — 99213 OFFICE O/P EST LOW 20 MIN: CPT | Performed by: PSYCHIATRY & NEUROLOGY

## 2021-11-02 NOTE — PROGRESS NOTES
[facility]    Date of visit: 11/2/2021     Chief complaint:   Chief Complaint   Patient presents with     Pain      Interval history:  Nena Tang was last seen by me on 7/27/21    Recommendations/plan at the last visit included:  1. Physical Therapy: continue HEP  2. Pain Psychologist to address issues of relaxation, behavioral change, coping style, and other factors important to improvement: patient agreeable to try to participate with pain psych.- yes, has been following with Dr. Kohler pain psychologist and Dr. Brothers psychiatrist for depression  3. Diagnostic Studies: none  4. Urine toxicology screen: none   5. Medication Management:   1. No changes  2. Discussed stopping gabapentin as she isn't sure if helpful and dose isn't likely high enough to be helpful for pain. She doesn't want to at this time- would like to reassess in the future  6. Further procedures recommended: right SI joint injection, right L5-S1 facet joint injection  7. Other treatments: none  8. Recommendations/follow-up for PCP:  none  9. Follow up: 8 weeks     Since her last visit, Nena Tang reports  -had right SI joint and right lower facet joint procedure 8/2/21  -she had good improvement with her pain for a few days, but it wore off  -her blood sugars significantly elevated and she was hospitalized   -she has trouble with walking, doing steps, and laying on her side.  -diabetes getting under better control.  Since being at [place], her pain is worse over time.  -done some physical hterapy.  -hard to stand.      Pain scores:  Data Unavailable     Current pain medications include:  - Gabapentin 300 mg at bedtime -  sedation so concerned about adding in daytime doses.  - Topamax 200 daily- at higher dose, had runny stool      Previous medication treatments included:  - Tylenol 650 mg prn - no benefit  - Diclofenac gel - does not take   - Flexeril - no benefit  - Robaxin (methocarbamol) 1000mg dose-  not helpful  - Diclofenac gel QID- not using    Other treatments have included:  Nena Tang has not been seen at a pain clinic in the past.    PT: yes, last session 6/2019 outpatient and most recent home PT a couple of months ago 3-4 sessions  Pain Psych: yes   Acupuncture: none  Chiropractor: none  TENs Unit: none  Injections:    - shoulder injection does not remember when - no benefit   - Right SIJ and Right GTB injection on 4/5/2021    Side Effects: None    Medications:  Current Outpatient Medications   Medication Sig Dispense Refill     acetaminophen (TYLENOL) 325 MG tablet Take 650mg at bedtime scheduled and additional 650mg every 6 hours as needed, max 3000mg/day 200 tablet 3     albuterol (PROAIR HFA/PROVENTIL HFA/VENTOLIN HFA) 108 (90 Base) MCG/ACT inhaler Inhale 2 puffs into the lungs 4 times daily as needed for shortness of breath / dyspnea 8 g 0     alcohol swab prep pads Use to swab area of injection/rodolfo as directed. 100 each 3     amantadine (SYMMETREL) 100 MG capsule Take 1 capsule (100 mg)  in the morning and 2 capsules (200 mg) in the evening. (Patient taking differently: Take 100 mg by mouth daily Take 1 capsule (100 mg)  in the morning and 2 capsules (200 mg) in the evening.) 60 capsule 3     aspirin (ASA) 81 MG EC tablet TAKE 1 TABLET (81MG) BY MOUTH DAILY 90 tablet 1     atorvastatin (LIPITOR) 80 MG tablet TAKE 1 TABLET (80MG) BY MOUTH DAILY 30 tablet 11     benzonatate (TESSALON) 100 MG capsule Take 1 capsule (100 mg) by mouth 3 times daily as needed for cough 90 capsule 1     blood glucose (NO BRAND SPECIFIED) test strip Use to test blood sugar 10 times daily or as directed. 100 strip 11     clonazePAM (KLONOPIN) 0.5 MG tablet Take 1 tablet (0.5 mg) by mouth At Bedtime 30 tablet 0     Continuous Blood Gluc Sensor (DEXCOM G6 SENSOR) MISC Change every 10 days. 3 each 11     Continuous Blood Gluc Transmit (DEXCOM G6 TRANSMITTER) MISC Change every 3 months. 1 each 3     escitalopram  (LEXAPRO) 10 MG tablet Take 1 tablet (10 mg) by mouth daily 30 tablet 3     famotidine (PEPCID) 20 MG tablet Take 1 tablet (20 mg) by mouth daily 90 tablet 3     gabapentin (NEURONTIN) 300 MG capsule Take 1 capsule (300 mg) by mouth At Bedtime 30 capsule 3     hydrOXYzine (VISTARIL) 25 MG capsule Take 1 capsule (25 mg) by mouth every 4 hours as needed for anxiety May take nightly for sleep (Patient taking differently: Take 25 mg by mouth At Bedtime ) 60 capsule 3     insulin aspart (NOVOLOG FLEXPEN) 100 UNIT/ML pen Inject 6 Units Subcutaneous 3 times daily (with meals) Hold for glucose less than 70. 30 mL 1     insulin glargine (LANTUS PEN) 100 UNIT/ML pen Inject 19 Units Subcutaneous 2 times daily 30 mL 1     insulin pen needle (NOVOFINE 30) 30G X 8 MM miscellaneous USE 4 DAILY OR AS DIRECTED 400 each 1     losartan (COZAAR) 100 MG tablet TAKE 1 TABLET (100MG) BY MOUTH DAILY 30 tablet 5     metoprolol succinate ER (TOPROL-XL) 25 MG 24 hr tablet Take 2 tablets (50 mg) by mouth daily (Patient taking differently: Take 50 mg by mouth daily In the AM) 180 tablet 3     mirtazapine (REMERON) 15 MG tablet Take 1 tablet (15 mg) by mouth At Bedtime 30 tablet 3     nystatin (MYCOSTATIN) 604862 UNIT/GM external powder Apply topically 2 times daily as needed 45 g 1     order for DME Equipment being ordered: Depends. 30 each 4     order for DME Equipment being ordered: CPAP Supplies. 1 each 0     paliperidone ER (INVEGA) 9 MG 24 hr tablet Take 1 tablet (9 mg) by mouth At Bedtime 30 tablet 2     pantoprazole (PROTONIX) 40 MG EC tablet Take 1 tablet (40 mg) by mouth daily 90 tablet 1     senna-docusate (SENOKOT-S/PERICOLACE) 8.6-50 MG tablet Take 1 tablet daily and take an extra tablet with constipation. 60 tablet 10     thin (NO BRAND SPECIFIED) lancets Use with lanceting device. To accompany: Blood Glucose Monitor Brands: per insurance. 100 each 6     topiramate (TOPAMAX) 200 MG tablet Take 1 tablet (200 mg) by mouth daily 30  tablet 3     traZODone (DESYREL) 100 MG tablet Take 1 tablet (100 mg) by mouth At Bedtime 90 tablet 1     TRULICITY 1.5 MG/0.5ML pen Inject 1.5 mg Subcutaneous once a week Every Friday 4 mL 3     zinc oxide (DESITIN) 20 % external ointment Apply topically 3 times daily as needed for irritation (barrier cream) 60 g 3       Medical History: any changes in medical history since they were last seen? No    Physical Exam:  BP (!) 153/87   Pulse 80   LMP 01/06/2015 (Exact Date)   SpO2 96%    Constitutional: Alert, no distress.   Head: normocephalic. Atraumatic.     Musculoskeletal exam:  Gait/Station/Posture:  upright, antalgic. Wide based.  Right SI joint tenderness, right paraspinal tenderness   Some tenderness along the greater trochanteric bursa   Pain with range of motion of hip, including SHANON      Assessment:   1.  Widespread pain, she does meet 2010 fibromyalgia diagnostic criteria. Patient continues to endorse generalized tenderness, soreness and pain to low back, upper and lower extremities and shoulders. Currently on gabapentin, however unable to tolerate sedation SE and therefore only taking it at night. Would benefit from something less sedating.  2. Chronic low back pain- SI joint, likely some facet arthropathy as well.  3. Deconditioning -she has lost some weight, most likely due to current topiramate use.  However, patient unable to perform minimal tasks due to poor exercise tolerance      Plan:  1. Physical Therapy: continue HEP  2. Pain Psychologist to address issues of relaxation, behavioral change, coping style, and other factors important to improvement: patient agreeable to try to participate with pain psych.- yes, has been following with Dr. Kohler pain psychologist and Dr. Brothers psychiatrist for depression  3. Diagnostic Studies: none  4. Urine toxicology screen: none   5. Medication Management: No changes  6. Further procedures recommended: given her blood sugar changes, not offering at this  time.  7. Ortho  referral- eval of hip  8. Other treatments: none  9. Recommendations/follow-up for PCP:  none  10. Follow up: 1 month    22 minutes spent on the date of encounter doing chart review, history, and exam documentation and further activities as noted above.    Shell Paz MD  [facility]

## 2021-11-02 NOTE — PATIENT INSTRUCTIONS
To contact Ortho  to schedule, call [phone].    Schedule follow up in 1 month.    ----------------------------------------------------------------  Clinic Number:  [phone]     Call with any questions about your care and for scheduling assistance.     Calls are returned Monday through Friday between 8 AM and 4:30 PM. We usually get back to you within 2 business days depending on the issue/request.    If we are prescribing your medications:    For opioid medication refills, call the clinic or send a [employer] message 7 days in advance.  Please include:    Name of requested medication    Name of the pharmacy.    For non-opioid medications, call your pharmacy directly to request a refill. Please allow 3-4 days to be processed.     Per [place] State Law:    All controlled substance prescriptions must be filled within 30 days of being written.      For those controlled substances allowing refills, pickup must occur within 30 days of last fill.      We believe regular attendance is key to your success in our program!      Any time you are unable to keep your appointment we ask that you call us at least 24 hours in advance to cancel.This will allow us to offer the appointment time to another patient.     Multiple missed appointments may lead to dismissal from the clinic.      
Satisfactory

## 2021-11-04 ENCOUNTER — MEDICAL CORRESPONDENCE (OUTPATIENT)
Dept: HEALTH INFORMATION MANAGEMENT | Facility: CLINIC | Age: 61
End: 2021-11-04
Payer: COMMERCIAL

## 2021-11-04 NOTE — TELEPHONE ENCOUNTER
DIAGNOSIS: Hip pain, right- possible hip injection /Dr Paz   APPOINTMENT DATE: 11.15.21   NOTES STATUS DETAILS   OFFICE NOTE from referring provider N/A    OFFICE NOTE from other specialist Internal 11.2.21 Dr Ruba Paz, Pain  7.27.21  10.28.21 Rowena Haas, [facility]  8.2.21 - injection   DISCHARGE SUMMARY from hospital N/A    DISCHARGE REPORT from the ER N/A    OPERATIVE REPORT N/A    EMG report N/A    MEDICATION LIST Internal    MRI Internal 11.19.20 lumbar spine   DEXA (osteoporosis/bone health) Internal    CT SCAN Internal 6.15.21 abdomen pelvis  10.22.18 lumbar spine   XRAYS (IMAGES & REPORTS) Internal 11.17.19 R hip and pelvis

## 2021-11-09 ENCOUNTER — THERAPY VISIT (OUTPATIENT)
Dept: PHYSICAL THERAPY | Facility: CLINIC | Age: 61
End: 2021-11-09
Attending: NURSE PRACTITIONER
Payer: COMMERCIAL

## 2021-11-09 DIAGNOSIS — M25.551 HIP PAIN, RIGHT: ICD-10-CM

## 2021-11-09 PROCEDURE — 97110 THERAPEUTIC EXERCISES: CPT | Mod: GP | Performed by: PHYSICAL THERAPIST

## 2021-11-09 PROCEDURE — 97162 PT EVAL MOD COMPLEX 30 MIN: CPT | Mod: GP | Performed by: PHYSICAL THERAPIST

## 2021-11-09 ASSESSMENT — ACTIVITIES OF DAILY LIVING (ADL)
PUTTING_ON_SOCKS_AND_SHOES: EXTREME DIFFICULTY
TWISTING/PIVOTING_ON_INVOLVED_LEG: EXTREME DIFFICULTY
HOS_ADL_SCORE(%): 27.94
WALKING_DOWN_STEEP_HILLS: EXTREME DIFFICULTY
ROLLING_OVER_IN_BED: EXTREME DIFFICULTY
RECREATIONAL_ACTIVITIES: UNABLE TO DO
DEEP_SQUATTING: EXTREME DIFFICULTY
WALKING_INITIALLY: MODERATE DIFFICULTY
WALKING_15_MINUTES_OR_GREATER: EXTREME DIFFICULTY
HOS_ADL_HIGHEST_POTENTIAL_SCORE: 68
LIGHT_TO_MODERATE_WORK: MODERATE DIFFICULTY
HOS_ADL_ITEM_SCORE_TOTAL: 19
STEPPING_UP_AND_DOWN_CURBS: EXTREME DIFFICULTY
GOING_DOWN_1_FLIGHT_OF_STAIRS: EXTREME DIFFICULTY
WALKING_APPROXIMATELY_10_MINUTES: EXTREME DIFFICULTY
WALKING_UP_STEEP_HILLS: EXTREME DIFFICULTY
HOS_ADL_COUNT: 17
GETTING_INTO_AND_OUT_OF_A_BATHTUB: EXTREME DIFFICULTY
STANDING_FOR_15_MINUTES: MODERATE DIFFICULTY
SITTING_FOR_15_MINUTES: SLIGHT DIFFICULTY
GOING_UP_1_FLIGHT_OF_STAIRS: EXTREME DIFFICULTY
HEAVY_WORK: EXTREME DIFFICULTY
GETTING_INTO_AND_OUT_OF_AN_AVERAGE_CAR: EXTREME DIFFICULTY

## 2021-11-09 NOTE — PROGRESS NOTES
Physical Therapy Initial Evaluation  Subjective:  The history is provided by the patient ().   Patient Health History  Nena Tang being seen for R hip .     Problem began: 10/28/2021.   Problem occurred: R hip pain began ~8 months   Pain is reported as 7/10 on pain scale.  General health as reported by patient is poor.  Pertinent medical history includes: depression, diabetes, high blood pressure, incontinence, mental illness, migraines/headaches, numbness/tingling, overweight and sleep disorder/apnea (Bipolar/schizophrenic).   Red flags:  None as reported by patient.  Medical allergies: none.   Surgeries include:  None.    Current medications:  High blood pressure medication and pain medication.    Current occupation is none.                     Therapist Generated HPI Evaluation         Type of problem:  Right hip.    This is a chronic condition.  Condition occurred with:  Insidious onset.  Where condition occurred: for unknown reasons.  Patient reports pain:  Posterior and lateral.  Pain is described as sharp and aching and is intermittent.  Pain radiates to:  No radiation. Pain is worse in the P.M. (activity dependent).  Since onset symptoms are unchanged.  Associated symptoms:  Loss of strength and loss of motion/stiffness. Symptoms are exacerbated by transfers, walking, descending stairs, ascending stairs and bending/squatting (in/out of bath tub)  and relieved by NSAID's and rest (sitting).  Imaging testing: x-rays  scheduled.  Previous treatment includes physical therapy. There was none improvement following previous treatment.  Work activity restrictions: N/A.  Home/work barriers: Patient lives in assisted living, several steps into house.                        Objective:  Standing Alignment:        Lumbar:  Lordosis decr (trunk flexion)            Gait:    Gait Type:  Antalgic   Assistive Devices:  Walker  Deviations:  Lumbar:  Trunk flexionAnkle:  Heel strike decr R and push off  decr RGeneral Deviations:  Merna decr, base of support incr and stance time decr    Flexibility/Screens:       Lower Extremity:  Decreased left lower extremity flexibility:Adductors; Quadriceps; Hamstrings and Gastroc    Decreased right lower extremity flexibility:  Adductors; Piriformis; Hip Flexors; Quadriceps; Hamstrings and Gastroc                                                 Hip Evaluation  HIP AROM:    Flexion: Left: 95    Right:  95    Extension: Left: 5 degrees short of neutral    Right:  5  Abduction: Left:  35    Right:  40      Internal Rotation: Left: 10    Right: 20  External Rotation: Left: 25    Right: 30  Knee Flexion:  Left: 110    Right: 110  Knee Extension: Left: 0    Right: 0  Hip PROM:                    Pain: limiting R hip AROM        Hip Strength:    Flexion:   Left: 4+/5   -  Pain:  Right: 3-/5   ++  Pain:                    Extension:  Left: 3/5  Pain:Right: 3-/5    Pain:    Abduction:  Left: 3/5     Pain:Right: 3-/5   +   Pain:        Knee Flexion:  Left: 3/5   Pain:Right: 3-/5   Pain:  Knee Extension:  Left: 4/5   Pain:Right: 4-/5    Pain:        Hip Special Testing:       Right hip positive for the following special tests:  Piriformis; Bhaskar and ThomasRight hip negative for the following special tests:  SLR    Hip Palpation:      Right hip tenderness present at:  Greater Trachanter; hip flexors; Piriformis and Gluteus Medius  Functional Testing:            Proprioception:    Stork balance test:   Left:    Unable  Right:  Unable  % of Uninvolved:                General     ROS    Assessment/Plan:    Patient is a 61 year old female with right hip complaints.    Patient has the following significant findings with corresponding treatment plan.                Diagnosis 1:  Chronic R hip pain  Pain -  splint/taping/bracing/orthotics, self management, education and home program  Decreased ROM/flexibility - manual therapy, therapeutic exercise, therapeutic activity and home  program  Decreased strength - therapeutic exercise, therapeutic activities and home program  Decreased proprioception - neuro re-education, gait training, therapeutic activities and home program  Impaired gait - gait training, assistive devices and home program  Decreased function - therapeutic activities and home program    Therapy Evaluation Codes:   1) History comprised of:   Personal factors that impact the plan of care:      Cognition and Time since onset of symptoms.    Comorbidity factors that impact the plan of care are:      Mental illness.     Medications impacting care: None.  2) Examination of Body Systems comprised of:   Body structures and functions that impact the plan of care:      Hip.   Activity limitations that impact the plan of care are:      Dressing, Lifting, Squatting/kneeling, Stairs, Standing, Walking and Sleeping.  3) Clinical presentation characteristics are:   Stable/Uncomplicated.  4) Decision-Making    Low complexity using standardized patient assessment instrument and/or measureable assessment of functional outcome.  Cumulative Therapy Evaluation is: Low complexity.    Previous and current functional limitations:  (See Goal Flow Sheet for this information)    Short term and Long term goals: (See Goal Flow Sheet for this information)     Communication ability:  Patient appears to be able to clearly communicate and understand verbal and written communication and follow directions correctly.  Treatment Explanation - The following has been discussed with the patient:   RX ordered/plan of care  Anticipated outcomes  Possible risks and side effects  This patient would benefit from PT intervention to resume normal activities.   Rehab potential is fair to good.    Frequency:  1 X week, once daily  Duration:  for 12 weeks  Discharge Plan:  Achieve all LTG.  Independent in home treatment program.  Reach maximal therapeutic benefit.    Please refer to the daily flowsheet for treatment today,  total treatment time and time spent performing 1:1 timed codes.

## 2021-11-10 ENCOUNTER — TELEPHONE (OUTPATIENT)
Dept: FAMILY MEDICINE | Facility: CLINIC | Age: 61
End: 2021-11-10
Payer: COMMERCIAL

## 2021-11-10 NOTE — PROGRESS NOTES
[facility]    OUTPATIENT Physical Therapy ORTHOPEDIC EVALUATION  PLAN OF TREATMENT FOR OUTPATIENT REHABILITATION  (COMPLETE FOR INITIAL CLAIMS ONLY)  Patient's Last Name, First Name, M.I.  YOB: 1960  Nena Tang    Provider s Name:  [facility]   Medical Record No.  [id number]   Start of Care Date:  11/09/21   Onset Date:   10/28/21 (MD appt)   Type:     _X__PT   ___OT Medical Diagnosis:    Encounter Diagnosis   Name Primary?     Hip pain, right         Treatment Diagnosis:  Chronic R hip pain        Goals:     11/09/21 0500   Body Part   Goals listed below are for hip   Goal #1   Goal #1 ambulation   Previous Functional Level No restrictions   Current Functional Level Feet patient can walk;with walker   Performance Level 75 with PL7/10 PL   STG Target Performance Feet patient will be able to walk;with walker   Performance Level 150 with PL 5/10 or less   Rationale for safe household ambulation;for safe outdoor household ambulation;for safe community ambulation   Due Date 12/21/21    LTG Target Performance Feet patient will be able to walk;with walker   Performance Level 300 with PL 3-4/10 or less   Rationale for safe household ambulation;for safe outdoor household ambulation;for safe community ambulation   Due Date 02/01/22       Therapy Frequency:  1x/week  Predicted Duration of Therapy Intervention:  12 weeks    Blanka Suggs, PT                 I CERTIFY THE NEED FOR THESE SERVICES FURNISHED UNDER        THIS PLAN OF TREATMENT AND WHILE UNDER MY CARE     (Physician attestation of this document indicates review and certification of the therapy plan).                       Certification Date From:  11/09/21   Certification Date To:  02/06/22    Referring Provider:  Rowena Haas    Initial Assessment        See Epic Evaluation SOC Date:  11/09/21

## 2021-11-10 NOTE — TELEPHONE ENCOUNTER
Reason for Call:  Form, our goal is to have forms completed with 72 hours, however, some forms may require a visit or additional information.    Type of letter, form or note:  Home Health Certification HOME HEALTH CERTIFICATION (08/29/21 - 10/27/21)    Who is the form from?: Home care    Where did the form come from: form was faxed in    What clinic location was the form placed at?: [facility]    Where the form was placed: [place] Box/Folder    What number is listed as a contact on the form?:   P;[phone]  F; [phone]       Additional comments: PLEASE REVIEW, SIGN, AND FAX.     Call taken on 11/10/2021 at 8:08 AM by Chelsea Chino

## 2021-11-11 ENCOUNTER — MEDICAL CORRESPONDENCE (OUTPATIENT)
Dept: HEALTH INFORMATION MANAGEMENT | Facility: CLINIC | Age: 61
End: 2021-11-11

## 2021-11-11 DIAGNOSIS — F25.1 SCHIZOAFFECTIVE DISORDER, DEPRESSIVE TYPE (H): ICD-10-CM

## 2021-11-11 DIAGNOSIS — K21.9 GASTROESOPHAGEAL REFLUX DISEASE WITHOUT ESOPHAGITIS: ICD-10-CM

## 2021-11-11 DIAGNOSIS — Z53.9 DIAGNOSIS NOT YET DEFINED: Primary | ICD-10-CM

## 2021-11-11 PROCEDURE — G0179 MD RECERTIFICATION HHA PT: HCPCS | Performed by: NURSE PRACTITIONER

## 2021-11-11 RX ORDER — PANTOPRAZOLE SODIUM 40 MG/1
40 TABLET, DELAYED RELEASE ORAL DAILY
Qty: 90 TABLET | Refills: 1 | Status: SHIPPED | OUTPATIENT
Start: 2021-11-11 | End: 2022-05-09

## 2021-11-11 RX ORDER — TRAZODONE HYDROCHLORIDE 100 MG/1
100 TABLET ORAL
Qty: 90 TABLET | Refills: 1 | Status: SHIPPED | OUTPATIENT
Start: 2021-11-11 | End: 2021-11-30

## 2021-11-11 NOTE — TELEPHONE ENCOUNTER
Reason for Call:  Medication or medication refill:    Do you use a [facility]?  Name of the pharmacy and phone number for the current request:  [id number] - [place] [address] [phone]    Name of the medication requested: traZODone (DESYREL) 100 MG tablet    Other request: N/A     Can we leave a detailed message on this number? YES    Phone number patient can be reached at: Cell number on file:    Telephone Information:   Mobile [phone]       Best Time: Any time    Call taken on 11/11/2021 at 1:40 PM by Derrick Oliva

## 2021-11-11 NOTE — TELEPHONE ENCOUNTER
Reason for Call:  Medication or medication refill:    Do you use a [facility]?  Name of the pharmacy and phone number for the current request:    [id number] - [place] [address] [phone]  Name of the medication requested: pantoprazole (PROTONIX) 40 MG      Other request: N/A    Can we leave a detailed message on this number? YES    Phone number patient can be reached at: Cell number on file:    Telephone Information:   Mobile [phone]       Best Time: Any time    Call taken on 11/11/2021 at 1:30 PM by Derrick Oliva

## 2021-11-11 NOTE — TELEPHONE ENCOUNTER
"Requested Prescriptions   Pending Prescriptions Disp Refills     traZODone (DESYREL) 100 MG tablet 30 tablet 3     Sig: Take 1 tablet (100 mg) by mouth nightly as needed for sleep       Serotonin Modulators Passed - 11/11/2021  1:42 PM        Passed - Recent (12 mo) or future (30 days) visit within the authorizing provider's specialty     Patient has had an office visit with the authorizing provider or a provider within the authorizing providers department within the previous 12 mos or has a future within next 30 days. See \"Patient Info\" tab in inbasket, or \"Choose Columns\" in Meds & Orders section of the refill encounter.              Passed - Medication is active on med list        Passed - Patient is age 18 or older        Passed - No active pregnancy on record        Passed - No positive pregnancy test in past 12 months           Signed Prescriptions:                        Disp   Refills    traZODone (DESYREL) 100 MG tablet          90 tab*1        Sig: Take 1 tablet (100 mg) by mouth nightly as needed for           sleep  Authorizing Provider: MICHAELLE CALIXTO  Ordering User: PATI GARCIA      "

## 2021-11-11 NOTE — TELEPHONE ENCOUNTER
"Requested Prescriptions   Pending Prescriptions Disp Refills     pantoprazole (PROTONIX) 40 MG EC tablet 30 tablet 3     Sig: Take 1 tablet (40 mg) by mouth daily       PPI Protocol Passed - 11/11/2021  1:32 PM        Passed - Not on Clopidogrel (unless Pantoprazole ordered)        Passed - No diagnosis of osteoporosis on record        Passed - Recent (12 mo) or future (30 days) visit within the authorizing provider's specialty     Patient has had an office visit with the authorizing provider or a provider within the authorizing providers department within the previous 12 mos or has a future within next 30 days. See \"Patient Info\" tab in inbasket, or \"Choose Columns\" in Meds & Orders section of the refill encounter.              Passed - Medication is active on med list        Passed - Patient is age 18 or older        Passed - No active pregnacy on record        Passed - No positive pregnancy test in past 12 months           Signed Prescriptions:                        Disp   Refills    pantoprazole (PROTONIX) 40 MG EC tablet    90 tab*1        Sig: Take 1 tablet (40 mg) by mouth daily  Authorizing Provider: MICHAELLE CALIXTO  Ordering User: PATI GARCIA      "

## 2021-11-15 ENCOUNTER — OFFICE VISIT (OUTPATIENT)
Dept: ORTHOPEDICS | Facility: CLINIC | Age: 61
End: 2021-11-15
Attending: PSYCHIATRY & NEUROLOGY
Payer: COMMERCIAL

## 2021-11-15 ENCOUNTER — PRE VISIT (OUTPATIENT)
Dept: ORTHOPEDICS | Facility: CLINIC | Age: 61
End: 2021-11-15

## 2021-11-15 VITALS — HEIGHT: 62 IN | WEIGHT: 213 LBS | BODY MASS INDEX: 39.2 KG/M2

## 2021-11-15 DIAGNOSIS — M46.1 SI (SACROILIAC) JOINT INFLAMMATION (H): ICD-10-CM

## 2021-11-15 DIAGNOSIS — M25.551 PAIN IN JOINT INVOLVING RIGHT PELVIC REGION AND THIGH: Primary | ICD-10-CM

## 2021-11-15 PROCEDURE — 99203 OFFICE O/P NEW LOW 30 MIN: CPT | Performed by: FAMILY MEDICINE

## 2021-11-15 ASSESSMENT — MIFFLIN-ST. JEOR: SCORE: 1481.47

## 2021-11-15 NOTE — LETTER
11/15/2021      RE: Nena Tang  [address] [place] [address]       Sports Medicine Clinic Visit    PCP: Rowena Haas    Nena D Kitty is a 61 year old female who is seen  in consultation at the request of Dr. Paz presenting with right hip pain.  Patient has been specific with focal right-sided posterior hemipelvis pain over the last year.  She points to the area of the right SI joint as her area of discomfort.  It is painful her to roll over in bed onto the hip.  It is painful for her to get out of a chair and start to walk.  Her son is with her today.  He notes in the last 7 months its been increasingly difficult for her to make motions to rise from a chair without significant pain.  She currently lives in a group home.  She does see physical therapy twice a week.  She is seen the pain clinic consistently for this issue over the last year.  She had a CT scan of her abdomen and pelvis that did not find obvious pelvic findings.  She had an x-ray of her hips and pelvis that did not show significant DJD of the SI joint or hips.  She had an MRI of her lumbar spine that showed no evidence of radicular impingement.  She had both lumbar facet and SI joint injections.  She had SI joint injections in April and we repeated it again in August.  Patient indicates that the injections helped for about a week and made it easier to walk and the problem hours recurred.  The last injections in August were both lumbar facet injections as well as an SI joint injection.  She ended up with an elevated blood sugar that prompted a hospitalization.  Today she is willing to walk around the clinic.  She is willing to step up onto the table with her weight on her right leg.  But it is uncomfortable for her to sit and uncomfortable for her to reposition herself on the table.  She does so slowly and with extreme pain complaints.  She points focally to the area of the right posterior hemipelvis is her area  "of pain.  She has a chronically elevated CRP, she has a history of uterine cancer 2016.  The most recent CRP elevations however, 2 months ago, at the time of a hospitalization for acute respiratory illness/pneumonitis.  She has recently normal kidney function tests.     She has seen rheumatology in the past, last seen in 2019, for history of positive AIDA with centromere pattern.    H/o Diabetes, positive AIDA, infectious encephalopathy, tardive dyskinesia, retinopathy, migraine headaches, schizoaffective disorder, chronic low back pain, sleep apnea, coronary artery disease, restless legs, rotator cuff syndrome, B12 deficiency, anemia, coronary artery disease, overweight, history of episodic cocaine abuse      Injury: Chronic    Location of Pain: right posterior hip  Duration of Pain: 6 month(s)  Rating of Pain: 7/10  Pain is better with: Nothing  Pain is worse with: Walking/standing for prolonged periods  Additional Features: No  Treatment so far consists of: PT, CSI  Prior History of related problems: No    Ht 1.562 m (5' 1.5\")   Wt 96.6 kg (213 lb)   LMP 01/06/2015 (Exact Date)   BMI 39.59 kg/m            Through pain clinic pt had right SI joint and right lower facet joint procedure 8/2/21.  She reported good improvement with her pain for a few days, but it wore off.  Her blood sugars significantly elevated and she was hospitalized     Pain clinic note Dr. Paz 11/2/21 reviewed, below:  \"Current pain medications include:  - Gabapentin 300 mg at bedtime -  sedation so concerned about adding in daytime doses.  - Topamax 200 daily- at higher dose, had runny stool        Previous medication treatments included:  - Tylenol 650 mg prn - no benefit  - Diclofenac gel - does not take   - Flexeril - no benefit  - Robaxin (methocarbamol) 1000mg dose- not helpful  - Diclofenac gel QID- not using     Other treatments have included:  Nena Tang has not been seen at a pain clinic in the past.    PT: yes, last " "session 6/2019 outpatient and most recent home PT a couple of months ago 3-4 sessions  Pain Psych: yes   Acupuncture: none  Chiropractor: none  TENs Unit: none  Injections:    - shoulder injection does not remember when - no benefit   - Right SIJ and Right GTB injection on 4/5/2021\"      Imaging studies below reviewed by me 11/15/21:  PELVIS AND RIGHT HIP TWO VIEWS  11/17/2019 4:58 PM     HISTORY:  Pain after falling.     COMPARISON:  None.     FINDINGS:  No fracture or osseous lesion is seen. The joint spaces are  well preserved. There is calcification in the vascular structures. No  other soft tissue pathology is seen.                                                                      IMPRESSION: No acute abnormality is seen.     EYAL HARDIN MD          MR LUMBAR SPINE W/O CONTRAST 11/19/2020 2:48 PM     Provided History: Back pain, prior surgery, new or progressive sx;  Chronic right-sided low back pain without sciatica; Chronic  right-sided low back pain without sciatica     Comparison: CT lumbar spine 10/22/2018     Technique: Sagittal T1-weighted, sagittal STIR, 3D volumetric axial  and sagittal reconstructed T2-weighted images of the lumbar spine were  obtained without intravenous contrast.      Findings: There are 5 lumbar-type vertebrae counting down from the C2.   The tip of the conus medullaris is at L1-2.  Normal lumbar vertebral  alignment.  There is no significant disc height narrowing at any  level.  Normal marrow signal. Multilevel mild facet arthropathy.     On a level by level basis:     T12-L1: No spinal canal or neuroforaminal stenosis.     L1-2: No spinal canal or neuroforaminal stenosis.     L2-3: No spinal canal or neuroforaminal stenosis.     L3-4: No spinal canal or neuroforaminal stenosis.     L4-5: No spinal canal or neuroforaminal stenosis.     L5-S1: Posterior disc bulge. No spinal canal or neuroforaminal  stenosis.     Paraspinous tissues are within normal limits. Mild, normal " variant,  dependent edema in the subcutaneous dorsal soft tissues.                                                                      Impression: Minimal degenerative change at L5-S1 without spinal canal  stenosis or neural foraminal narrowing at any level.     I have personally reviewed the examination and initial interpretation  and I agree with the findings.     KAITLIN BARILLAS MD          CT ABDOMEN PELVIS W CONTRAST 6/15/2021 8:34 AM     CLINICAL HISTORY: Abdominal pain, acute, nonlocalized; epigastric pain  and tenderness, history of uterine cancer     TECHNIQUE: CT scan of the abdomen and pelvis was performed following  injection of IV contrast. Multiplanar reformats were obtained. Dose  reduction techniques were used.  CONTRAST: 100mL Isovue-370     COMPARISON: CT 7/13/2017     FINDINGS:   LOWER CHEST: Mild right coronary artery calcifications.     HEPATOBILIARY: Post cholecystectomy with stable mild biliary ductal  dilatation. No obstructing mass lesion or radiodense stone.  Unremarkable liver.     PANCREAS: Normal.     SPLEEN: Normal.     ADRENAL GLANDS: Normal.     KIDNEYS/BLADDER: Symmetric bilateral perinephric stranding is likely  chronic. Stable 3 mm nonobstructing lower right renal stone. No  hydronephrosis or hydroureter. Unremarkable urinary bladder.     BOWEL: Small duodenal diverticula. Diastases recti. Normal caliber  small bowel. Normal appendix. Unremarkable colon. No free air or free  fluid.     PELVIC ORGANS: Post hysterectomy.     ADDITIONAL FINDINGS: Moderate atherosclerosis.     MUSCULOSKELETAL: Spinal degenerative changes.                                                                      IMPRESSION:   1.  No acute or suspicious findings in the abdomen or pelvis is by the  patient's symptoms. No evidence of an inflammatory process or bowel  obstruction.     MARISELA BUSH MD            Pt h/o uterine cancer      9/29/21 bun 15, cr 0.98; GFr 63    H/o chronically elevated  CRP      PMH:  Past Medical History:   Diagnosis Date     Acute respiratory failure with hypoxia (H) 9/4/2017     CAD (coronary artery disease)     5/2014 cath, nonbostructive stenosis to LAD, RCA.     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/2011     Noncompliance with medication regimen 6/8/2011     Obesity      CNIDY (obstructive sleep apnea) 3/8/2012    uses CPAP     Osteopenia 10/7/2009     Pneumonia of right lower lobe due to infectious organism 9/4/2017     Schizoaffective disorder, depressive type (H) 2/25/2013     Sepsis (H) 8/29/2017     Suicidal intent 10/2/2013     Takotsubo cardiomyopathy      Type 2 diabetes mellitus (H) 8/30/2011     Uterine cancer (H) 1983     Verbal auditory hallucination 10/4/2012       Active problem list:  Patient Active Problem List   Diagnosis     Osteopenia     Vitamin B12 deficiency without anemia     Hyperlipidemia LDL goal <100     Rotator cuff syndrome     Type 2 diabetes mellitus with mild nonproliferative retinopathy (H)     Illiterate     Irritable bowel syndrome     overweight - BMI >35     Takotsubo cardiomyopathy     CAD (coronary artery disease)     Restless legs syndrome (RLS)     CINDY (obstructive sleep apnea)- mild AHI 10.3     Verbal auditory hallucination     Chronic low back pain     Schizoaffective disorder, depressive type (H)     Migraine headache     HTN, goal below 140/90     Health Care Home     Lumbago     Cervicalgia     Cocaine abuse, episodic (H)     Suicidal ideation     Esophageal reflux     Mild nonproliferative diabetic retinopathy (H)     Tardive dyskinesia     Alcohol use     Left cataract     Falls frequently     History of  uterine cancer     Psychophysiological insomnia     Dysuria     Asymptomatic postmenopausal status     Abdominal pain, right lower quadrant     Infectious encephalopathy     Non-intractable vomiting with nausea     Thoughts of self harm     Gastroenteritis     Posttraumatic stress disorder     Cervical cancer screening     Type 2 diabetes mellitus with stage 3 chronic kidney disease, with long-term current use of insulin (H)     Positive AIDA (antinuclear antibody)     Hyperglycemia     Pneumonia     Depression with suicidal ideation     Mixed stress and urge urinary incontinence     Chronic pain syndrome     Fibromyalgia     Type 2 diabetes mellitus without complication, with long-term current use of insulin (H)     Dehydration     Hypoglycemia     Acute kidney injury (H)     Diarrhea, unspecified type     2019 novel coronavirus disease (COVID-19)     Hip pain, right       FH:  Family History   Problem Relation Age of Onset     Cancer Mother         BLADDER     Respiratory Mother         COPD     Gastrointestinal Disease Mother         CIRRHOSIS OF LI BOLIVAR     Alcohol/Drug Mother      Diabetes Mother      Hypertension Mother      Lipids Mother      C.A.D. Mother      Glaucoma Mother      Alcohol/Drug Sister      Mental Illness Sister      Alcohol/Drug Sister      Psychotic Disorder Sister      Cancer Maternal Grandmother         UNKNOWN TYPE     Cancer Brother         COLON     Cancer - colorectal Brother         IN HIS LATE 30S     Alcohol/Drug Brother          OF HEROIN OVERDOSE AT AGE 22 YRS     Macular Degeneration No family hx of        SH:  Social History     Socioeconomic History     Marital status:      Spouse name: Not on file     Number of children: Not on file     Years of education: Not on file     Highest education level: Not on file   Occupational History     Not on file   Tobacco Use     Smoking status: Never Smoker     Smokeless tobacco: Never Used   Substance and Sexual Activity      Alcohol use: No     Comment: last month     Drug use: No     Comment: history of     Sexual activity: Never     Partners: Male     Birth control/protection: None, Condom   Other Topics Concern     Parent/sibling w/ CABG, MI or angioplasty before 65F 55M? Not Asked      Service No     Blood Transfusions No     Caffeine Concern No     Occupational Exposure No     Hobby Hazards No     Sleep Concern Yes     Stress Concern Yes     Weight Concern Yes     Special Diet Yes     Comment: DM     Back Care Yes     Exercise Yes     Comment: WALKS DAILY     Bike Helmet Not Asked     Seat Belt Yes     Self-Exams No     Comment: ENCOURAGED   Social History Narrative     10/2014. Has 2 sons. 7 grandchildren.         Unemployed. Graduated HS.         Tobacco use: Denies    Alcohol use: Escalated use since   10/2014    Drug: Denies     Social Determinants of Health     Financial Resource Strain: Not on file   Food Insecurity: Not on file   Transportation Needs: Not on file   Physical Activity: Not on file   Stress: Not on file   Social Connections: Not on file   Intimate Partner Violence: Not on file   Housing Stability: Not on file       MEDS:  See EMR, reviewed  ALL:  See EMR, reviewed    REVIEW OF SYSTEMS:  CONSTITUTIONAL:NEGATIVE for fever, chills, change in weight  INTEGUMENTARY/SKIN: NEGATIVE for worrisome rashes, moles or lesions  EYES: NEGATIVE for vision changes or irritation  ENT/MOUTH: NEGATIVE for ear, mouth and throat problems  RESP:NEGATIVE for significant cough or SOB  BREAST: NEGATIVE for masses, tenderness or discharge  CV: NEGATIVE for chest pain, palpitations or peripheral edema  GI: NEGATIVE for nausea, abdominal pain, heartburn, or change in bowel habits  :NEGATIVE for frequency, dysuria, or hematuria  :NEGATIVE for frequency, dysuria, or hematuria  NEURO: NEGATIVE for weakness, dizziness or paresthesias  ENDOCRINE: NEGATIVE for temperature intolerance, skin/hair  changes  HEME/ALLERGY/IMMUNE: NEGATIVE for bleeding problems  PSYCHIATRIC: NEGATIVE for changes in mood or affect          Objective: She points directly to the isolated posterior buttock in the area of the right SI joint is her area of discomfort.  I can find no consistent tenderness over the greater trochanter on the right.  She does remain tender in the area of the right SI joint.  There are no overlying skin lesions or ulcerations.  She is nontender directly over the lumbar spine.  She tolerates internal and external rotation and abduction of the right hip through adequate ROM with some minimal discomfort.  Straight leg raise is negative.  Strength is intact at hip, knee, ankle and foot.  She will put weight on the isolated right extremity as she transitioned herself from stepstool to examining table.    Assessment: Persistent chronic right-sided posterior hemipelvis pain over the last year.  Past history of uterine cancer.  Past history of positive JUSTIN with centromere pattern.  Past history of elevated CRP.  Possible sacroiliitis.    Plan: MRI of the pelvis to focus on the SI joint and sacrum discussed with radiology.  Contrast was not thought to be needed.  Rule out insufficiency fracture, osteomyelitis, space-occupying lesion, sacroiliitis.  Follow-up after the MRI face-to-face to discuss options.              Getachew Lopez MD

## 2021-11-15 NOTE — PROGRESS NOTES
Sports Medicine Clinic Visit    PCP: Rowena Haas Kitty is a 61 year old female who is seen  in consultation at the request of Dr. Paz presenting with right hip pain.  Patient has been specific with focal right-sided posterior hemipelvis pain over the last year.  She points to the area of the right SI joint as her area of discomfort.  It is painful her to roll over in bed onto the hip.  It is painful for her to get out of a chair and start to walk.  Her son is with her today.  He notes in the last 7 months its been increasingly difficult for her to make motions to rise from a chair without significant pain.  She currently lives in a group home.  She does see physical therapy twice a week.  She is seen the pain clinic consistently for this issue over the last year.  She had a CT scan of her abdomen and pelvis that did not find obvious pelvic findings.  She had an x-ray of her hips and pelvis that did not show significant DJD of the SI joint or hips.  She had an MRI of her lumbar spine that showed no evidence of radicular impingement.  She had both lumbar facet and SI joint injections.  She had SI joint injections in April and we repeated it again in August.  Patient indicates that the injections helped for about a week and made it easier to walk and the problem hours recurred.  The last injections in August were both lumbar facet injections as well as an SI joint injection.  She ended up with an elevated blood sugar that prompted a hospitalization.  Today she is willing to walk around the clinic.  She is willing to step up onto the table with her weight on her right leg.  But it is uncomfortable for her to sit and uncomfortable for her to reposition herself on the table.  She does so slowly and with extreme pain complaints.  She points focally to the area of the right posterior hemipelvis is her area of pain.  She has a chronically elevated CRP, she has a history of uterine cancer 2016.  The  "most recent CRP elevations however, 2 months ago, at the time of a hospitalization for acute respiratory illness/pneumonitis.  She has recently normal kidney function tests.     She has seen rheumatology in the past, last seen in 2019, for history of positive AIDA with centromere pattern.    H/o Diabetes, positive AIDA, infectious encephalopathy, tardive dyskinesia, retinopathy, migraine headaches, schizoaffective disorder, chronic low back pain, sleep apnea, coronary artery disease, restless legs, rotator cuff syndrome, B12 deficiency, anemia, coronary artery disease, overweight, history of episodic cocaine abuse      Injury: Chronic    Location of Pain: right posterior hip  Duration of Pain: 6 month(s)  Rating of Pain: 7/10  Pain is better with: Nothing  Pain is worse with: Walking/standing for prolonged periods  Additional Features: No  Treatment so far consists of: PT, CSI  Prior History of related problems: No    Ht 1.562 m (5' 1.5\")   Wt 96.6 kg (213 lb)   LMP 01/06/2015 (Exact Date)   BMI 39.59 kg/m            Through pain clinic pt had right SI joint and right lower facet joint procedure 8/2/21.  She reported good improvement with her pain for a few days, but it wore off.  Her blood sugars significantly elevated and she was hospitalized     Pain clinic note Dr. Paz 11/2/21 reviewed, below:  \"Current pain medications include:  - Gabapentin 300 mg at bedtime -  sedation so concerned about adding in daytime doses.  - Topamax 200 daily- at higher dose, had runny stool        Previous medication treatments included:  - Tylenol 650 mg prn - no benefit  - Diclofenac gel - does not take   - Flexeril - no benefit  - Robaxin (methocarbamol) 1000mg dose- not helpful  - Diclofenac gel QID- not using     Other treatments have included:  Nena Tang has not been seen at a pain clinic in the past.    PT: yes, last session 6/2019 outpatient and most recent home PT a couple of months ago 3-4 sessions  Pain Psych: " "yes   Acupuncture: none  Chiropractor: none  TENs Unit: none  Injections:    - shoulder injection does not remember when - no benefit   - Right SIJ and Right GTB injection on 4/5/2021\"      Imaging studies below reviewed by me 11/15/21:  PELVIS AND RIGHT HIP TWO VIEWS  11/17/2019 4:58 PM     HISTORY:  Pain after falling.     COMPARISON:  None.     FINDINGS:  No fracture or osseous lesion is seen. The joint spaces are  well preserved. There is calcification in the vascular structures. No  other soft tissue pathology is seen.                                                                      IMPRESSION: No acute abnormality is seen.     EYAL HARDIN MD          MR LUMBAR SPINE W/O CONTRAST 11/19/2020 2:48 PM     Provided History: Back pain, prior surgery, new or progressive sx;  Chronic right-sided low back pain without sciatica; Chronic  right-sided low back pain without sciatica     Comparison: CT lumbar spine 10/22/2018     Technique: Sagittal T1-weighted, sagittal STIR, 3D volumetric axial  and sagittal reconstructed T2-weighted images of the lumbar spine were  obtained without intravenous contrast.      Findings: There are 5 lumbar-type vertebrae counting down from the C2.   The tip of the conus medullaris is at L1-2.  Normal lumbar vertebral  alignment.  There is no significant disc height narrowing at any  level.  Normal marrow signal. Multilevel mild facet arthropathy.     On a level by level basis:     T12-L1: No spinal canal or neuroforaminal stenosis.     L1-2: No spinal canal or neuroforaminal stenosis.     L2-3: No spinal canal or neuroforaminal stenosis.     L3-4: No spinal canal or neuroforaminal stenosis.     L4-5: No spinal canal or neuroforaminal stenosis.     L5-S1: Posterior disc bulge. No spinal canal or neuroforaminal  stenosis.     Paraspinous tissues are within normal limits. Mild, normal variant,  dependent edema in the subcutaneous dorsal soft tissues.                                 "                                      Impression: Minimal degenerative change at L5-S1 without spinal canal  stenosis or neural foraminal narrowing at any level.     I have personally reviewed the examination and initial interpretation  and I agree with the findings.     KAITLIN BARILLAS MD          CT ABDOMEN PELVIS W CONTRAST 6/15/2021 8:34 AM     CLINICAL HISTORY: Abdominal pain, acute, nonlocalized; epigastric pain  and tenderness, history of uterine cancer     TECHNIQUE: CT scan of the abdomen and pelvis was performed following  injection of IV contrast. Multiplanar reformats were obtained. Dose  reduction techniques were used.  CONTRAST: 100mL Isovue-370     COMPARISON: CT 7/13/2017     FINDINGS:   LOWER CHEST: Mild right coronary artery calcifications.     HEPATOBILIARY: Post cholecystectomy with stable mild biliary ductal  dilatation. No obstructing mass lesion or radiodense stone.  Unremarkable liver.     PANCREAS: Normal.     SPLEEN: Normal.     ADRENAL GLANDS: Normal.     KIDNEYS/BLADDER: Symmetric bilateral perinephric stranding is likely  chronic. Stable 3 mm nonobstructing lower right renal stone. No  hydronephrosis or hydroureter. Unremarkable urinary bladder.     BOWEL: Small duodenal diverticula. Diastases recti. Normal caliber  small bowel. Normal appendix. Unremarkable colon. No free air or free  fluid.     PELVIC ORGANS: Post hysterectomy.     ADDITIONAL FINDINGS: Moderate atherosclerosis.     MUSCULOSKELETAL: Spinal degenerative changes.                                                                      IMPRESSION:   1.  No acute or suspicious findings in the abdomen or pelvis is by the  patient's symptoms. No evidence of an inflammatory process or bowel  obstruction.     MARISELA BUSH MD            Pt h/o uterine cancer      9/29/21 bun 15, cr 0.98; GFr 63    H/o chronically elevated CRP      PMH:  Past Medical History:   Diagnosis Date     Acute respiratory failure with hypoxia (H)  9/4/2017     CAD (coronary artery disease)     5/2014 cath, nonbostructive stenosis to LAD, RCA.     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/2011     Noncompliance with medication regimen 6/8/2011     Obesity      CINDY (obstructive sleep apnea) 3/8/2012    uses CPAP     Osteopenia 10/7/2009     Pneumonia of right lower lobe due to infectious organism 9/4/2017     Schizoaffective disorder, depressive type (H) 2/25/2013     Sepsis (H) 8/29/2017     Suicidal intent 10/2/2013     Takotsubo cardiomyopathy      Type 2 diabetes mellitus (H) 8/30/2011     Uterine cancer (H) 1983     Verbal auditory hallucination 10/4/2012       Active problem list:  Patient Active Problem List   Diagnosis     Osteopenia     Vitamin B12 deficiency without anemia     Hyperlipidemia LDL goal <100     Rotator cuff syndrome     Type 2 diabetes mellitus with mild nonproliferative retinopathy (H)     Illiterate     Irritable bowel syndrome     overweight - BMI >35     Takotsubo cardiomyopathy     CAD (coronary artery disease)     Restless legs syndrome (RLS)     CINDY (obstructive sleep apnea)- mild AHI 10.3     Verbal auditory hallucination     Chronic low back pain     Schizoaffective disorder, depressive type (H)     Migraine headache     HTN, goal below 140/90     Health Care Home     Lumbago     Cervicalgia     Cocaine abuse, episodic (H)     Suicidal ideation     Esophageal reflux     Mild nonproliferative diabetic retinopathy (H)     Tardive dyskinesia     Alcohol use     Left cataract     Falls frequently     History of uterine cancer     Psychophysiological insomnia     Dysuria     Asymptomatic postmenopausal status      Abdominal pain, right lower quadrant     Infectious encephalopathy     Non-intractable vomiting with nausea     Thoughts of self harm     Gastroenteritis     Posttraumatic stress disorder     Cervical cancer screening     Type 2 diabetes mellitus with stage 3 chronic kidney disease, with long-term current use of insulin (H)     Positive AIDA (antinuclear antibody)     Hyperglycemia     Pneumonia     Depression with suicidal ideation     Mixed stress and urge urinary incontinence     Chronic pain syndrome     Fibromyalgia     Type 2 diabetes mellitus without complication, with long-term current use of insulin (H)     Dehydration     Hypoglycemia     Acute kidney injury (H)     Diarrhea, unspecified type     2019 novel coronavirus disease (COVID-19)     Hip pain, right       FH:  Family History   Problem Relation Age of Onset     Cancer Mother         BLADDER     Respiratory Mother         COPD     Gastrointestinal Disease Mother         CIRRHOSIS OF LI BOLIVAR     Alcohol/Drug Mother      Diabetes Mother      Hypertension Mother      Lipids Mother      C.A.D. Mother      Glaucoma Mother      Alcohol/Drug Sister      Mental Illness Sister      Alcohol/Drug Sister      Psychotic Disorder Sister      Cancer Maternal Grandmother         UNKNOWN TYPE     Cancer Brother         COLON     Cancer - colorectal Brother         IN HIS LATE 30S     Alcohol/Drug Brother          OF HEROIN OVERDOSE AT AGE 22 YRS     Macular Degeneration No family hx of        SH:  Social History     Socioeconomic History     Marital status:      Spouse name: Not on file     Number of children: Not on file     Years of education: Not on file     Highest education level: Not on file   Occupational History     Not on file   Tobacco Use     Smoking status: Never Smoker     Smokeless tobacco: Never Used   Substance and Sexual Activity     Alcohol use: No     Comment: last month     Drug use: No     Comment: history of     Sexual activity: Never      Partners: Male     Birth control/protection: None, Condom   Other Topics Concern     Parent/sibling w/ CABG, MI or angioplasty before 65F 55M? Not Asked      Service No     Blood Transfusions No     Caffeine Concern No     Occupational Exposure No     Hobby Hazards No     Sleep Concern Yes     Stress Concern Yes     Weight Concern Yes     Special Diet Yes     Comment: DM     Back Care Yes     Exercise Yes     Comment: WALKS DAILY     Bike Helmet Not Asked     Seat Belt Yes     Self-Exams No     Comment: ENCOURAGED   Social History Narrative     10/2014. Has 2 sons. 7 grandchildren.         Unemployed. Graduated HS.         Tobacco use: Denies    Alcohol use: Escalated use since   10/2014    Drug: Denies     Social Determinants of Health     Financial Resource Strain: Not on file   Food Insecurity: Not on file   Transportation Needs: Not on file   Physical Activity: Not on file   Stress: Not on file   Social Connections: Not on file   Intimate Partner Violence: Not on file   Housing Stability: Not on file       MEDS:  See EMR, reviewed  ALL:  See EMR, reviewed    REVIEW OF SYSTEMS:  CONSTITUTIONAL:NEGATIVE for fever, chills, change in weight  INTEGUMENTARY/SKIN: NEGATIVE for worrisome rashes, moles or lesions  EYES: NEGATIVE for vision changes or irritation  ENT/MOUTH: NEGATIVE for ear, mouth and throat problems  RESP:NEGATIVE for significant cough or SOB  BREAST: NEGATIVE for masses, tenderness or discharge  CV: NEGATIVE for chest pain, palpitations or peripheral edema  GI: NEGATIVE for nausea, abdominal pain, heartburn, or change in bowel habits  :NEGATIVE for frequency, dysuria, or hematuria  :NEGATIVE for frequency, dysuria, or hematuria  NEURO: NEGATIVE for weakness, dizziness or paresthesias  ENDOCRINE: NEGATIVE for temperature intolerance, skin/hair changes  HEME/ALLERGY/IMMUNE: NEGATIVE for bleeding problems  PSYCHIATRIC: NEGATIVE for changes in mood or  affect          Objective: She points directly to the isolated posterior buttock in the area of the right SI joint is her area of discomfort.  I can find no consistent tenderness over the greater trochanter on the right.  She does remain tender in the area of the right SI joint.  There are no overlying skin lesions or ulcerations.  She is nontender directly over the lumbar spine.  She tolerates internal and external rotation and abduction of the right hip through adequate ROM with some minimal discomfort.  Straight leg raise is negative.  Strength is intact at hip, knee, ankle and foot.  She will put weight on the isolated right extremity as she transitioned herself from stepstool to examining table.    Assessment: Persistent chronic right-sided posterior hemipelvis pain over the last year.  Past history of uterine cancer.  Past history of positive JUSTIN with centromere pattern.  Past history of elevated CRP.  Possible sacroiliitis.    Plan: MRI of the pelvis to focus on the SI joint and sacrum discussed with radiology.  Contrast was not thought to be needed.  Rule out insufficiency fracture, osteomyelitis, space-occupying lesion, sacroiliitis.  Follow-up after the MRI face-to-face to discuss options.

## 2021-11-16 DIAGNOSIS — F25.0 SCHIZOAFFECTIVE DISORDER, BIPOLAR TYPE (H): ICD-10-CM

## 2021-11-16 ASSESSMENT — PATIENT HEALTH QUESTIONNAIRE - PHQ9: SUM OF ALL RESPONSES TO PHQ QUESTIONS 1-9: 0

## 2021-11-16 NOTE — TELEPHONE ENCOUNTER
Requested Prescriptions   Pending Prescriptions Disp Refills     clonazePAM (KLONOPIN) 0.5 MG tablet 30 tablet 0     Sig: Take 1 tablet (0.5 mg) by mouth At Bedtime       There is no refill protocol information for this order        Routing refill request to provider for review/approval because:  Drug not on the [facility] refill protocol

## 2021-11-16 NOTE — TELEPHONE ENCOUNTER
Reason for Call:  Medication or medication refill:    Do you use a [facility]?  Name of the pharmacy and phone number for the current request:  [id number] - [place] [address] (Pharmacy)    Name of the medication requested: clonazePAM (KLONOPIN) 0.5 MG tablet      NOVOFINE 30 DISPOS MS      Other request:    NOVOFINE 30 DISPOS MS NOT LISTED ON MEDS & ORDERS    Can we leave a detailed message on this number? YES    Phone number patient can be reached at: Cell number on file:    Telephone Information:   Mobile [phone]       Best Time: ANY    Call taken on 11/16/2021 at 3:28 PM by Cheryl Beltran

## 2021-11-17 ENCOUNTER — OFFICE VISIT (OUTPATIENT)
Dept: PSYCHIATRY | Facility: CLINIC | Age: 61
End: 2021-11-17
Payer: COMMERCIAL

## 2021-11-17 DIAGNOSIS — F20.89 OTHER SCHIZOPHRENIA (H): Primary | ICD-10-CM

## 2021-11-17 PROCEDURE — 99207 PR CDG-MDM COMPONENT: MEETS HIGH - UP CODED: CPT | Performed by: PSYCHIATRY & NEUROLOGY

## 2021-11-17 PROCEDURE — 99214 OFFICE O/P EST MOD 30 MIN: CPT | Performed by: PSYCHIATRY & NEUROLOGY

## 2021-11-17 RX ORDER — CLONAZEPAM 0.5 MG/1
0.5 TABLET ORAL AT BEDTIME
Qty: 30 TABLET | Refills: 0 | Status: SHIPPED | OUTPATIENT
Start: 2021-11-17 | End: 2021-12-17

## 2021-11-17 RX ORDER — MIRTAZAPINE 15 MG/1
15 TABLET, FILM COATED ORAL AT BEDTIME
Qty: 30 TABLET | Refills: 3 | Status: SHIPPED | OUTPATIENT
Start: 2021-11-17 | End: 2022-01-28

## 2021-11-18 NOTE — PROGRESS NOTES
Visit Date: 2021    MEDICATION MANAGEMENT NOTE    IDENTIFICATION:  Ms. Nena Tang is a 61-year-old female who is followed for schizophrenia.  She comes in today with her home health aide.  She lives in a group home, and I did discuss the case with the charge nurse at her group home over the telephone.  Today, Ms. Tang reports her mood is pretty good on the Lexapro.  She is not experiencing any hallucinations, but she is having insomnia.  She is also having nightmares and often wakes up thinking she needs to call her mother, even though her mother is .  Her group home would like me to give her something for her insomnia.  She is already on trazodone 100 and has been for years.  The group home would prefer that I not discontinue the trazodone but add something to it that might help the patient get better sleep.  Therefore, today, I added Remeron 15 mg p.o. at bedtime.  I mentioned to the patient and to the charge nurse that I will be leaving this clinic sometime in the next 6 months and that it is time to start looking into other psychiatric care.  I do, however plan to be here for several months and made an appointment for her to see me in 2 months.  Other than her nightmares, she seems to be doing very well with her current medication regimen.  She does seem to be improved since starting Lexapro.    MENTAL STATUS EXAMINATION:  Today, the patient was pleasant and cooperative.  Her mood was pretty good, her affect full.  her speech is soft and slow, but generally coherent and goal oriented.  Her associations tight.  Her thought processes logical and linear.  Her content of thought currently without psychosis, though she has had intermittent psychosis.  She does have nightmares.  Recent and remote memory, concentration, fund of knowledge and use of language were at baseline.  She is alert and oriented x 3.  Her gait and station are abnormal, particularly her gait.  She has significant hip pain and  has been seeing pain doctors.    MEDICATIONS:  The patient's current medications include:  1. Lexapro.  2.  Invega.  3.  Trazodone.  4.  I am starting Remeron 15 mg.    ASSESSMENT:  Chronic paranoid schizophrenia.    RECOMMENDATIONS:  Continue current medications, but add Remeron 15 mg.    Reginald Brothers MD        D: 2021   T: 2021   MT: [id number]    Name:     ERIK BURNHAM  MRN:      [id number]        Account:    [id number]   :      1960           Visit Date: 2021     Document: [id number]

## 2021-11-29 VITALS — BODY MASS INDEX: 39.2 KG/M2 | WEIGHT: 213 LBS | HEIGHT: 62 IN

## 2021-11-29 ASSESSMENT — MIFFLIN-ST. JEOR: SCORE: 1481.47

## 2021-11-29 NOTE — PROGRESS NOTES
Nena Tang is a 61 year old who is being evaluated via a billable video visit.      How would you like to obtain your AVS? Mail a copy  If the video visit is dropped, the invitation should be resent by: Send to e-mail at: [email or web address]   PLEASE CALL IF VIDEO FAILS @ [phone]  Will anyone else be joining your video visit? YES, Amal from [facility].     Are you currently in the state of [place] Yes  Does patient have any form of state insurance?Yes   Do you have wifi? Yes  Do you have a smart phone/device?No  Can you download an cooper on your phone comfortably with out assistance including You Tube? No    If patient encounters technical issues they should call [phone] :[id number]}    Ana María Fajardo CMA    Video-Visit Details  Changed to phone visit as they could not get on the video.  Phone Start Time: 11:14 AM    Type of service:  Video Visit    Video End Time:12:00 PM

## 2021-11-29 NOTE — PATIENT INSTRUCTIONS
Your BMI is Body mass index is 39.59 kg/m .  Weight management is a personal decision.  If you are interested in exploring weight loss strategies, the following discussion covers the approaches that may be successful. Body mass index (BMI) is one way to tell whether you are at a healthy weight, overweight, or obese. It measures your weight in relation to your height.  A BMI of 18.5 to 24.9 is in the healthy range. A person with a BMI of 25 to 29.9 is considered overweight, and someone with a BMI of 30 or greater is considered obese. More than two-thirds of American adults are considered overweight or obese.  Being overweight or obese increases the risk for further weight gain. Excess weight may lead to heart disease and diabetes.  Creating and following plans for healthy eating and physical activity may help you improve your health.  Weight control is part of healthy lifestyle and includes exercise, emotional health, and healthy eating habits. Careful eating habits lifelong are the mainstay of weight control. Though there are significant health benefits from weight loss, long-term weight loss with diet alone may be very difficult to achieve- studies show long-term success with dietary management in less than 10% of people. Attaining a healthy weight may be especially difficult to achieve in those with severe obesity. In some cases, medications, devices and surgical management might be considered.  What can you do?  If you are overweight or obese and are interested in methods for weight loss, you should discuss this with your provider.     Consider reducing daily calorie intake by 500 calories.     Keep a food journal.     Avoiding skipping meals, consider cutting portions instead.    Diet combined with exercise helps maintain muscle while optimizing fat loss. Strength training is particularly important for building and maintaining muscle mass. Exercise helps reduce stress, increase energy, and improves fitness.  Increasing exercise without diet control, however, may not burn enough calories to loose weight.       Start walking three days a week 10-20 minutes at a time    Work towards walking thirty minutes five days a week     Eventually, increase the speed of your walking for 1-2 minutes at time    In addition, we recommend that you review healthy lifestyles and methods for weight loss available through the National Institutes of Health patient information sites:  http://win.niddk.nih.gov/publications/index.htm    And look into health and wellness programs that may be available through your health insurance provider, employer, local community center, or roxane club.    Weight management plan: Patient was referred to their PCP to discuss a diet and exercise plan.

## 2021-11-30 ENCOUNTER — OFFICE VISIT (OUTPATIENT)
Dept: FAMILY MEDICINE | Facility: CLINIC | Age: 61
End: 2021-11-30
Payer: COMMERCIAL

## 2021-11-30 ENCOUNTER — OFFICE VISIT (OUTPATIENT)
Dept: PHARMACY | Facility: CLINIC | Age: 61
End: 2021-11-30
Payer: COMMERCIAL

## 2021-11-30 ENCOUNTER — VIRTUAL VISIT (OUTPATIENT)
Dept: SLEEP MEDICINE | Facility: CLINIC | Age: 61
End: 2021-11-30
Attending: HOSPITALIST
Payer: COMMERCIAL

## 2021-11-30 ENCOUNTER — TELEPHONE (OUTPATIENT)
Dept: SLEEP MEDICINE | Facility: CLINIC | Age: 61
End: 2021-11-30

## 2021-11-30 VITALS
HEART RATE: 86 BPM | DIASTOLIC BLOOD PRESSURE: 77 MMHG | HEIGHT: 60 IN | OXYGEN SATURATION: 97 % | BODY MASS INDEX: 42.8 KG/M2 | TEMPERATURE: 97.5 F | WEIGHT: 218 LBS | SYSTOLIC BLOOD PRESSURE: 134 MMHG

## 2021-11-30 DIAGNOSIS — G47.33 OSA (OBSTRUCTIVE SLEEP APNEA): Primary | ICD-10-CM

## 2021-11-30 DIAGNOSIS — G25.81 RESTLESS LEGS SYNDROME (RLS): ICD-10-CM

## 2021-11-30 DIAGNOSIS — G47.33 OSA (OBSTRUCTIVE SLEEP APNEA): ICD-10-CM

## 2021-11-30 DIAGNOSIS — F51.04 PSYCHOPHYSIOLOGICAL INSOMNIA: ICD-10-CM

## 2021-11-30 DIAGNOSIS — D50.8 OTHER IRON DEFICIENCY ANEMIA: ICD-10-CM

## 2021-11-30 DIAGNOSIS — Z79.4 TYPE 2 DIABETES MELLITUS WITH MILD NONPROLIFERATIVE RETINOPATHY WITHOUT MACULAR EDEMA, WITH LONG-TERM CURRENT USE OF INSULIN, UNSPECIFIED LATERALITY (H): ICD-10-CM

## 2021-11-30 DIAGNOSIS — F51.04 CHRONIC INSOMNIA: ICD-10-CM

## 2021-11-30 DIAGNOSIS — F25.1 SCHIZOAFFECTIVE DISORDER, DEPRESSIVE TYPE (H): ICD-10-CM

## 2021-11-30 DIAGNOSIS — E11.65 TYPE 2 DIABETES MELLITUS WITH HYPERGLYCEMIA, WITH LONG-TERM CURRENT USE OF INSULIN (H): ICD-10-CM

## 2021-11-30 DIAGNOSIS — E11.3299 TYPE 2 DIABETES MELLITUS WITH MILD NONPROLIFERATIVE RETINOPATHY WITHOUT MACULAR EDEMA, WITH LONG-TERM CURRENT USE OF INSULIN, UNSPECIFIED LATERALITY (H): Primary | ICD-10-CM

## 2021-11-30 DIAGNOSIS — K59.00 CONSTIPATION, UNSPECIFIED CONSTIPATION TYPE: ICD-10-CM

## 2021-11-30 DIAGNOSIS — Z79.4 TYPE 2 DIABETES MELLITUS WITH HYPERGLYCEMIA, WITH LONG-TERM CURRENT USE OF INSULIN (H): ICD-10-CM

## 2021-11-30 DIAGNOSIS — M25.551 HIP PAIN, RIGHT: ICD-10-CM

## 2021-11-30 DIAGNOSIS — F51.5 NIGHTMARES: ICD-10-CM

## 2021-11-30 DIAGNOSIS — E11.3299 TYPE 2 DIABETES MELLITUS WITH MILD NONPROLIFERATIVE RETINOPATHY WITHOUT MACULAR EDEMA, WITH LONG-TERM CURRENT USE OF INSULIN, UNSPECIFIED LATERALITY (H): ICD-10-CM

## 2021-11-30 DIAGNOSIS — Z79.4 TYPE 2 DIABETES MELLITUS WITH MILD NONPROLIFERATIVE RETINOPATHY WITHOUT MACULAR EDEMA, WITH LONG-TERM CURRENT USE OF INSULIN, UNSPECIFIED LATERALITY (H): Primary | ICD-10-CM

## 2021-11-30 DIAGNOSIS — F25.0 SCHIZOAFFECTIVE DISORDER, BIPOLAR TYPE (H): Primary | ICD-10-CM

## 2021-11-30 PROCEDURE — 99215 OFFICE O/P EST HI 40 MIN: CPT | Performed by: NURSE PRACTITIONER

## 2021-11-30 PROCEDURE — 99606 MTMS BY PHARM EST 15 MIN: CPT | Performed by: PHARMACIST

## 2021-11-30 PROCEDURE — 99607 MTMS BY PHARM ADDL 15 MIN: CPT | Performed by: PHARMACIST

## 2021-11-30 PROCEDURE — 99215 OFFICE O/P EST HI 40 MIN: CPT | Mod: 95 | Performed by: PHYSICIAN ASSISTANT

## 2021-11-30 RX ORDER — TRAZODONE HYDROCHLORIDE 100 MG/1
100 TABLET ORAL AT BEDTIME
Qty: 90 TABLET | Refills: 1 | COMMUNITY
Start: 2021-11-30 | End: 2022-05-05

## 2021-11-30 RX ORDER — AMOXICILLIN 250 MG
CAPSULE ORAL
Qty: 60 TABLET | Refills: 10 | Status: SHIPPED | OUTPATIENT
Start: 2021-11-30 | End: 2022-12-13

## 2021-11-30 ASSESSMENT — MIFFLIN-ST. JEOR: SCORE: 1480.34

## 2021-11-30 NOTE — TELEPHONE ENCOUNTER
Changed pressures to 10-18 cm H2O via [employer] and notified patient. Mask consult is scheduled on 12/8/21 at 2:00 pm.

## 2021-11-30 NOTE — PROGRESS NOTES
Date FBG/ 2hours post Lunch/2hours post Dinner /2hours post Bedtime   11/30 175 240/217     11/29 214 /221 205/195 277    184,143 191,200 242/173 154    133/115 169/234 /157 222   11/26    215   11/24 166 171/269      196  226/262 245      BP Readings from Last 3 Encounters:   11/30/21 134/77   11/02/21 (!) 153/87   10/28/21 133/74

## 2021-11-30 NOTE — PROGRESS NOTES
Sleep Consultation:    Date on this visit: 11/30/2021    Nena Tang is sent by [facility] Spears for a sleep consultation regarding CINDY.    Primary Physician: Rowena Haas     Nena Tang reports previously diagnosed CINDY. Her medical history is significant for irritable bowel syndrome, diabetes mellitus type 2, dyslipidemia, major depressive disorder, schizoaffective disorder, history of Takotsubo cardiomyopathy, migraine headaches, coronary artery disease with stent placement, and hypertension.    Nena had a PSG at [facility] on 10/16/2019.  Her AHI was 62.5/hr. Her O2 nimco was 70% and she had 51.4 minutes below 89%. CPAP was effective at 13 cm although REM supine was not observed. Her weight was 242#.    She has an auto CPAP (ResMed S10) with pressures 13-18 cm. She used CPAP for about a year and noticed better energy. She went through depression and had lost interest in continuing with it. She is interested in getting back on it now. Her mask is uncomfortable, too small, and leaks a lot. She got a mask from the hospital and one from somewhere else (she does not remember). Those are both the same and different from when she first started. She uses a full face mask. Her staff tries to do the mask fit test but can't get it to seal well. She feels the pressure is too high. She does use the humidifier. She denies dry nose or mouth, but may not be using it enough to know. She denies morning headache.    The compliance data shows that from 8/26-11/23/21 the patient used the CPAP for 8/90 nights, 1% of nights for >4 hours.  The 95th% pressure is 11.4 cm.  The 95th% leak is 59 lpm.  The average nightly usage is 59 min on days used.  The average AHI is 2.1/hr.     Nena goes to sleep at 12:00 AM during the week. She wakes up at 8:30 AM without an alarm. She falls asleep in 60 minutes.  Nena has difficulty falling asleep.  She is on 100 mg trazodone, 15 mg mirtazapine (just added a few  days ago), 25 mg hydroxyzine and 0.5 mg clonazepam at bedtime. Her nighttime medications are given at 9 PM.  She wakes up 3 times a night for 10-15 minutes before falling back to sleep.  Nena wakes up to go to the bathroom.  On weekends, Nena goes to sleep at 12:00 AM.  She wakes up at 11:00-11:30 AM without an alarm. She falls asleep in 60 minutes.  Patient gets an average of 7.5 hours of sleep per night.     Patient does not use electronics in bed, watch TV in bed and read in bed. She does have a hard time shutting her mind off at night.    Nena has a day program (exercise and activities) from 9 AM to 2:30 PM. Otherwise, she mostly watches TV in the day. She has a room to herself.    Nena does snore frequently and loudly. Patient does not have a regular bed partner. Her sister told her she snored and had pauses in breathing when they lived together about 3 months ago. Staff at her current facility also notice the snoring and apnea.  Patient sleeps on her side. She sometimes wakes with leg cramps. She has frequent restless legs and arms. She is on 300 mg gabapentin but does not feel it helps anymore. She has had low Hgb, hematocrit and RBC. She is not aware of any source of bleeding or melena/blood in stools. Nena has occasional sleep talking and denies any bruxism, sleep walking, dream enactment, sleep paralysis, and hypnogogic/hypnopompic hallucinations (may occasionally think someone is there for a second when she wakes). She has bad dreams nightly for a couple of months. She does know why it started. She denies that it relates to new medication or change in her living situation. Her staff note that that was happening before she moved to her new place. She has had one example of possible cataplexy. She was getting food from the cafeteria and was laughing hard. She lost her balance like her legs gave out on her. Someone had to catch her. It only lasted a second.    Nena has difficulty breathing  through her nose, reflux at night, heartburn and depression, denies claustrophobia.      Nena has lost 30 pounds in the last 2 years.  Patient describes themself as a night person.  She would prefer to go to sleep at 12:00 AM and wake up at 10:30-11 AM.  Patient's Minford Sleepiness score 13/24 consistent with mild daytime sleepiness.      Nena does not take naps. She takes inadvertant naps 1-2 times daily. She often dozes in the mid afternoon.  She does not drive.  Patient was counseled on the importance of driving while alert, to pull over if drowsy, or nap before getting into the vehicle if sleepy.  She uses 1-2 sodas/every other day and occasional coffee. Last caffeine intake is usually with dinner.    Allergies:    Allergies   Allergen Reactions     Imidazole Antifungals Hives     Tolerates diflucan     Ketoprofen Itching     Pruritis to topical     Lisinopril Hives     Metformin Other (See Comments)     Patient hospitalized for lactic acidosis - admitting provider suspectd caused by metformin     Metronidazole Hives     Posaconazole Hives     Tolerates diflucan       Medications:    Current Outpatient Medications   Medication Sig Dispense Refill     acetaminophen (TYLENOL) 325 MG tablet Take 650mg at bedtime scheduled and additional 650mg every 6 hours as needed, max 3000mg/day 200 tablet 3     albuterol (PROAIR HFA/PROVENTIL HFA/VENTOLIN HFA) 108 (90 Base) MCG/ACT inhaler Inhale 2 puffs into the lungs 4 times daily as needed for shortness of breath / dyspnea 8 g 0     alcohol swab prep pads Use to swab area of injection/rodolfo as directed. 100 each 3     amantadine (SYMMETREL) 100 MG capsule Take 1 capsule (100 mg)  in the morning and 2 capsules (200 mg) in the evening. (Patient taking differently: Take 100 mg by mouth daily Take 1 capsule (100 mg)  in the morning and 2 capsules (200 mg) in the evening.) 60 capsule 3     aspirin (ASA) 81 MG EC tablet TAKE 1 TABLET (81MG) BY MOUTH DAILY 90 tablet 1      atorvastatin (LIPITOR) 80 MG tablet TAKE 1 TABLET (80MG) BY MOUTH DAILY 30 tablet 11     benzonatate (TESSALON) 100 MG capsule Take 1 capsule (100 mg) by mouth 3 times daily as needed for cough 90 capsule 1     blood glucose (NO BRAND SPECIFIED) test strip Use to test blood sugar 10 times daily or as directed. 100 strip 11     clonazePAM (KLONOPIN) 0.5 MG tablet Take 1 tablet (0.5 mg) by mouth At Bedtime 30 tablet 0     Continuous Blood Gluc Sensor (DEXCOM G6 SENSOR) MISC Change every 10 days. 3 each 11     Continuous Blood Gluc Sensor (FREESTYLE LEBRON 14 DAY SENSOR) MISC 1 Device every 14 days Change sensor as directed every 14 days 2 each 11     Continuous Blood Gluc Transmit (DEXCOM G6 TRANSMITTER) MISC Change every 3 months. 1 each 3     escitalopram (LEXAPRO) 10 MG tablet Take 1 tablet (10 mg) by mouth daily 30 tablet 3     famotidine (PEPCID) 20 MG tablet Take 1 tablet (20 mg) by mouth daily 90 tablet 3     gabapentin (NEURONTIN) 300 MG capsule Take 1 capsule (300 mg) by mouth At Bedtime 30 capsule 3     hydrOXYzine (VISTARIL) 25 MG capsule Take 1 capsule (25 mg) by mouth every 4 hours as needed for anxiety May take nightly for sleep (Patient taking differently: Take 25 mg by mouth At Bedtime ) 60 capsule 3     insulin aspart (NOVOLOG FLEXPEN) 100 UNIT/ML pen Inject 6 Units Subcutaneous 3 times daily (with meals) Hold for glucose less than 70. 30 mL 1     insulin glargine (LANTUS PEN) 100 UNIT/ML pen Inject 17 Units Subcutaneous 2 times daily 30 mL 1     insulin pen needle (NOVOFINE 30) 30G X 8 MM miscellaneous USE 4 DAILY OR AS DIRECTED 400 each 1     losartan (COZAAR) 100 MG tablet TAKE 1 TABLET (100MG) BY MOUTH DAILY 30 tablet 5     metoprolol succinate ER (TOPROL-XL) 25 MG 24 hr tablet Take 2 tablets (50 mg) by mouth daily (Patient taking differently: Take 50 mg by mouth daily In the AM) 180 tablet 3     mirtazapine (REMERON) 15 MG tablet Take 1 tablet (15 mg) by mouth At Bedtime 30 tablet 3     nystatin  (MYCOSTATIN) 989083 UNIT/GM external powder Apply topically 2 times daily as needed 45 g 1     order for DME Equipment being ordered: Depends. 30 each 4     order for DME Equipment being ordered: CPAP Supplies. 1 each 0     paliperidone ER (INVEGA) 9 MG 24 hr tablet Take 1 tablet (9 mg) by mouth At Bedtime 30 tablet 2     pantoprazole (PROTONIX) 40 MG EC tablet Take 1 tablet (40 mg) by mouth daily 90 tablet 1     senna-docusate (SENOKOT-S/PERICOLACE) 8.6-50 MG tablet Take 1 tablet by mouth 2 times daily as needed for constipation 60 tablet 10     thin (NO BRAND SPECIFIED) lancets Use with lanceting device. To accompany: Blood Glucose Monitor Brands: per insurance. 100 each 6     topiramate (TOPAMAX) 200 MG tablet Take 1 tablet (200 mg) by mouth daily 30 tablet 3     traZODone (DESYREL) 100 MG tablet Take 1 tablet (100 mg) by mouth nightly as needed for sleep 90 tablet 1     TRULICITY 1.5 MG/0.5ML pen Inject 1.5 mg Subcutaneous once a week Every Friday 4 mL 3     zinc oxide (DESITIN) 20 % external ointment Apply topically 3 times daily as needed for irritation (barrier cream) 60 g 3       Problem List:  Patient Active Problem List    Diagnosis Date Noted     CAD (coronary artery disease) 02/29/2012     Priority: High      Tako-Tsubo stress cardiomyopathy 2009. Mild coronary artery disease,        Hip pain, right 11/09/2021     Priority: Medium     Dehydration 09/25/2021     Priority: Medium     Hypoglycemia 09/25/2021     Priority: Medium     Acute kidney injury (H) 09/25/2021     Priority: Medium     Diarrhea, unspecified type 09/25/2021     Priority: Medium     2019 novel coronavirus disease (COVID-19) 09/25/2021     Priority: Medium     Type 2 diabetes mellitus without complication, with long-term current use of insulin (H) 08/10/2021     Priority: Medium     Chronic pain syndrome 01/19/2021     Priority: Medium     Fibromyalgia 01/19/2021     Priority: Medium     Mixed stress and urge urinary incontinence  06/01/2020     Priority: Medium     Depression with suicidal ideation 12/21/2019     Priority: Medium     Pneumonia 07/28/2019     Priority: Medium     Hyperglycemia 05/15/2019     Priority: Medium     Positive AIDA (antinuclear antibody) 01/31/2019     Priority: Medium     Type 2 diabetes mellitus with stage 3 chronic kidney disease, with long-term current use of insulin (H) 06/05/2018     Priority: Medium     Cervical cancer screening 06/01/2018     Priority: Medium     Pt age 58  05/22/18: She had a total hysterectomy 35 years ago for uterine cancer, NIL pap, Neg HR HPV result. Plan cease pap screening per provider.  No history of MEHREEN 2 or greater found in epic.   No further cervical cancer screening recommended.    updated.              Posttraumatic stress disorder 01/29/2018     Priority: Medium     Gastroenteritis 12/08/2017     Priority: Medium     Thoughts of self harm 10/23/2017     Priority: Medium     Infectious encephalopathy 09/04/2017     Priority: Medium     Non-intractable vomiting with nausea 09/04/2017     Priority: Medium     Abdominal pain, right lower quadrant 07/13/2017     Priority: Medium     Asymptomatic postmenopausal status 06/28/2017     Priority: Medium     Dysuria 06/12/2017     Priority: Medium     Psychophysiological insomnia 03/09/2017     Priority: Medium     History of uterine cancer 12/07/2016     Priority: Medium     Yearly pap's per the provider office visit note on 12/07/16.  05/22/18: She had a total hysterectomy 35 years ago for uterine cancer, NIL pap, Neg HR HPV result. Plan cease pap screening per provider.  11/1/19 NIL, Neg HPV. No further screening per provider.         Falls frequently 08/18/2016     Priority: Medium     Left cataract 07/25/2016     Priority: Medium     Alcohol use 04/19/2016     Priority: Medium     Tardive dyskinesia 09/11/2015     Priority: Medium     Mild nonproliferative diabetic retinopathy (H) 06/19/2014     Priority: Medium     Problem list  name updated by automated process. Provider to review       Esophageal reflux 06/09/2014     Priority: Medium     Suicidal ideation 05/01/2014     Priority: Medium     Cocaine abuse, episodic (H) 10/03/2013     Priority: Medium     Lumbago 09/20/2013     Priority: Medium     Cervicalgia 09/20/2013     Priority: Medium     Health Care Home 08/16/2013     Priority: Medium     EMERGENCY CARE PLAN  Presenting Problem Signs and Symptoms Treatment Plan    Questions or concerns during clinic hours    I will call the clinic directly     Questions or concerns outside clinic hours    I will call the 24 hour nurse line at [phone]    Patient needs to schedule an appointment    I will call the 24 hour scheduling team at [phone] or clinic directly    Same day treatment     I will call the clinic first, nurse line if after hours, urgent care and express care if needed     Primary Care Provider Dr. Honorio Dias [facility] [phone]  Nurse Care Coordinator Idalmis Nettles -[phone]        HTN, goal below 140/90 07/29/2013     Priority: Medium     Migraine headache 04/22/2013     Priority: Medium     Schizoaffective disorder, depressive type (H) 02/25/2013     Priority: Medium     Chronic low back pain 01/22/2013     Priority: Medium     Verbal auditory hallucination 10/04/2012     Priority: Medium     CINDY (obstructive sleep apnea)- mild AHI 10.3 03/08/2012     Priority: Medium     Sleep study 3/12 (198#)-   AHI 10.3, with significant desaturations down to 74%. RDI 10.3. Periodic Limb Movement Index 3.2/hour.         Type 2 diabetes mellitus with mild nonproliferative retinopathy (H) 08/30/2011     Priority: Medium     Illiterate 08/30/2011     Priority: Medium     Rotator cuff syndrome 07/06/2011     Priority: Medium     Hyperlipidemia LDL goal <100 10/31/2010     Priority: Medium     Restless legs syndrome (RLS) 02/29/2012     Priority: Low     Irritable bowel syndrome      Priority: Low      overweight - BMI >35      Priority: Low     Takotsubo cardiomyopathy      Priority: Low     2009. Echo 2010, angio 2011 with normal LVF.        Vitamin B12 deficiency without anemia 11/23/2009     Priority: Low     Diagnosis updated by automated process. Provider to review and confirm.       Osteopenia 10/07/2009     Priority: Low     Dexa 2009- Lumbar Spine (L1-L4): T-score -1.8, Left Femoral Neck: T-score -1.0, Right Femoral Neck: T-score -1.0               Past Medical/Surgical History:  Past Medical History:   Diagnosis Date     Acute respiratory failure with hypoxia (H) 9/4/2017     CAD (coronary artery disease)     5/2014 cath, nonbostructive stenosis to LAD, RCA.     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/2011     Noncompliance with medication regimen 6/8/2011     Obesity      CINDY (obstructive sleep apnea) 3/8/2012    uses CPAP     Osteopenia 10/7/2009     Pneumonia of right lower lobe due to infectious organism 9/4/2017     Schizoaffective disorder, depressive type (H) 2/25/2013     Sepsis (H) 8/29/2017     Suicidal intent 10/2/2013     Takotsubo cardiomyopathy      Type 2 diabetes mellitus (H) 8/30/2011     Uterine cancer (H) 1983     Verbal auditory hallucination 10/4/2012     Past Surgical History:   Procedure Laterality Date     C OOPHORECTORMY FOR MALIG, W/BX  1983    UTERINE     CATARACT IOL, RT/LT Bilateral 2017     CHOLECYSTECTOMY       COLONOSCOPY N/A 3/16/2017    Procedure: COLONOSCOPY;  Surgeon: Traci Gonzalez MD;  Location:  GI     Coronary CTA  5/21/2014     HYSTERECTOMY  1983    uterine cancer yearly pap's per provider.     HYSTERECTOMY       LAPAROSCOPIC  CHOLECYSTECTOMY  2008     PHACOEMULSIFICATION CLEAR CORNEA WITH STANDARD INTRAOCULAR LENS IMPLANT Left 5/5/2017    Procedure: PHACOEMULSIFICATION CLEAR CORNEA WITH STANDARD INTRAOCULAR LENS IMPLANT;  LEFT EYE PHACOEMULSIFICATION CLEAR CORNEA WITH STANDARD INTRAOCULAR LENS IMPLANT ;  Surgeon: Tyra Diaz MD;  Location:  [facility]     PHACOEMULSIFICATION CLEAR CORNEA WITH STANDARD INTRAOCULAR LENS IMPLANT Right 6/30/2017    Procedure: PHACOEMULSIFICATION CLEAR CORNEA WITH STANDARD INTRAOCULAR LENS IMPLANT;  RIGHT EYE PHACOEMULSIFICATION CLEAR CORNEA WITH STANDARD INTRAOCULAR LENS IMPLANT;  Surgeon: Tyra Diaz MD;  Location:  [facility]     RELEASE TRIGGER FINGER  10/11/2012    Left thumb. Procedure: RELEASE TRIGGER FINGER;  LEFT THUMB TRIGGER RELEASE;  Surgeon: Tay Langley MD;  Location:  [facility]     RELEASE TRIGGER FINGER Right 12/26/2016    Procedure: RELEASE TRIGGER FINGER;  Surgeon: Albino Castañeda MD;  Location: [facility] OR       Social History:  Social History     Socioeconomic History     Marital status:      Spouse name: Not on file     Number of children: Not on file     Years of education: Not on file     Highest education level: Not on file   Occupational History     Not on file   Tobacco Use     Smoking status: Never Smoker     Smokeless tobacco: Never Used   Substance and Sexual Activity     Alcohol use: No     Comment: last month     Drug use: No     Comment: history of     Sexual activity: Never     Partners: Male     Birth control/protection: None, Condom   Other Topics Concern     Parent/sibling w/ CABG, MI or angioplasty before 65F 55M? Not Asked      Service No     Blood Transfusions No     Caffeine Concern No     Occupational Exposure No     Hobby Hazards No     Sleep Concern Yes     Stress Concern Yes     Weight Concern Yes     Special Diet Yes     Comment: DM     Back Care Yes     Exercise Yes     Comment: WALKS DAILY     Bike Helmet Not Asked     Seat Belt Yes     Self-Exams  No     Comment: ENCOURAGED   Social History Narrative     10/2014. Has 2 sons. 7 grandchildren.         Unemployed. Graduated HS.         Tobacco use: Denies    Alcohol use: Escalated use since   10/2014    Drug: Denies     Social Determinants of Health     Financial Resource Strain: Not on file   Food Insecurity: Not on file   Transportation Needs: Not on file   Physical Activity: Not on file   Stress: Not on file   Social Connections: Not on file   Intimate Partner Violence: Not on file   Housing Stability: Not on file       Family History:  Family History   Problem Relation Age of Onset     Cancer Mother         BLADDER     Respiratory Mother         COPD     Gastrointestinal Disease Mother         CIRRHOSIS OF LI BOLIVAR     Alcohol/Drug Mother      Diabetes Mother      Hypertension Mother      Lipids Mother      C.A.D. Mother      Glaucoma Mother      Alcohol/Drug Sister      Mental Illness Sister      Alcohol/Drug Sister      Psychotic Disorder Sister      Cancer Maternal Grandmother         UNKNOWN TYPE     Cancer Brother         COLON     Cancer - colorectal Brother         IN HIS LATE 30S     Alcohol/Drug Brother          OF HEROIN OVERDOSE AT AGE 22 YRS     Macular Degeneration No family hx of        Review of Systems:  A complete review of systems reviewed by me is negative with the exeption of what has been mentioned in the history of present illness.  CONSTITUTIONAL: NEGATIVE for weight gain/loss, fever, chills, sweats or night sweats, drug allergies.  EYES: NEGATIVE for changes in vision, blind spots, double vision.  EYES:  POSITIVE for blurry vision  ENT: NEGATIVE for ear pain, sore throat, sinus pain, post-nasal drip, runny nose, bloody nose  CARDIAC: NEGATIVE for fast heartbeats or fluttering in chest, chest pain or pressure, breathlessness when lying flat, swollen legs or swollen feet.  NEUROLOGIC: NEGATIVE headaches, weakness or numbness in the arms or legs.  PULMONARY: NEGATIVE  "SOB at rest, productive cough, coughing up blood, wheezing or whistling when breathing.    PULMONARY:  POSITIVE for  SOB with activity and dry cough  MUSCULOSKELETAL: NEGATIVE for bone pain, swollen joints.  MUSCULOSKELETAL:  POSITIVE for  muscle pain and hip pain    Physical Examination:  Vitals: Ht 1.562 m (5' 1.5\")   Wt 96.6 kg (213 lb)   LMP 01/06/2015 (Exact Date)   BMI 39.59 kg/m           [place] Total Score 11/29/2021   Total score - [place] 13       DEBBIE Total Score: 19 (11/29/21 1122)    Exam not conducted as this was a phone visit    Impression/Plan:    (G47.33) CINDY (obstructive sleep apnea)  (primary encounter diagnosis)  Comment: Nena was diagnosed with severe CINDY and hypoxemia in 10/2019 (AHI 62/hr). She had been benefiting from CPAP but stopped using it when dealing with severe depression. She recently has been trying to get back on it but the masks she has (obtained from the hospital) are uncomfortable and don't seal well. She also feels the pressure was too high, which may be related to the mask leak. It is possible that the pressures are higher than needed since she has lost about 30 pounds since her study. She does not sleep as well and is more tired without it.   Plan:  Order placed for new supplies and for a pressure change to 10-18 cm. She was advised to make an appointment with [facility] for a mask fitting. She will also need to bring the machine in for the pressure change.     (G25.81) Restless legs syndrome (RLS)  Comment: Has had nightly symptoms recently. Her labs show low Hgb, hematocrit and RBC in the last few months. She is on gabapentin 300 mg but she does not notice a benefit to that now. Paliperidone and hydroxyzine could be contributing.  Plan: She has an appointment with her primary soon and will discuss management. We could go up on the gabapentin to see if it helps once the iron is addressed. I would like to reduce some of her other sedating medications first. I " would consider getting her off of hydroxyzine.    (F51.5) Nightmares  Comment: nightly in the last few months, unclear what triggered them. She dreams of monsters. She denies any medication change that triggered them. They were occurring prior to her move to her living facility.  Plan: We will re-evaluate once back on CPAP. May consider prazosin, although these nightmares do not seem to be driven by PTSD.    (F51.04) Chronic insomnia  Comment: It takes her an hour to fall asleep when she goes to bed at midnight. She is up at 8:30 AM during the week and sleeps in until 11 AM on weekends. She is on 100 mg trazodone, 15 mg mirtazapine (just added a few days ago), 25 mg hydroxyzine and 0.5 mg clonazepam at bedtime.  Plan: Hopefully she will sleep better once her apnea is treated and we can start reducing some of her sedating medication. I recommended that she try to keep a sleep schedule of 12 AM to 8:30 AM daily and avoid napping. We will revisit this once back on CPAP    She will follow up with me in approximately two months.       Polysomnography reviewed.  Obstructive sleep apnea reviewed.  Complications of untreated sleep apnea were reviewed.    Phone visit duration: 46 min   Bennett Goltz, PA-C     CC: ART Saini CNP

## 2021-11-30 NOTE — PATIENT INSTRUCTIONS
- Increase Lantus to 19 units twice per day.   - Take senna-docusate 1 tablet daily and an extra tablet with constipation.   - Blood work ordered.   Call clinic with any questions or concerns.

## 2021-11-30 NOTE — PROGRESS NOTES
Medication Therapy Management (MTM) Encounter    ASSESSMENT:                            Medication Adherence/Access: No issues identified    Type 2 Diabetes: Patient is not meeting A1c goal of < 7%. Patient is not meeting average glucose goal of <150mg/dl Patient would benefit from Basal Insulin (Lantus) :  increase dose to 19u twice daily.    Sleep apnea: follow up with home medical as recommended    Constipation: increase senna docusate to 1 tablet daily consistently and add 1 tablet as needed for hard stools.   Once she is able to use CPAP, would like to cut down on some of her medications which could be both sedating and contributing to constipation. This includes: trazodone, mirtazapine, hydroxyzine  Other non-sedating medications that commonly cause constipation :Trulicity, paliperidone (lower risk than many 2nd gen antipsychotic), *amantadine    Hip pain: follow-up for MRI per sports medicine recommendation and continue physical activity. Continue PRN acetaminophen    Schizoaffective: stable. Encourage watching more calming movies prior to bedtime to see if there is any effect on nightmares. Polypharmacy with antihistaminic medications; will work with psychiatry on dose reductions once she is able to use CPAP regularly.     PLAN:                            1. Increase Lantus to 19u twice daily  2. Schedule senna-docusate 1 tablet daily. Take additional 1 tablet/day as needed    Follow-up: 1 month - plan for recheck labs at this appointment      SUBJECTIVE/OBJECTIVE:                          Nena Tang is a 61 year old female coming in for a follow-up visit. She was referred to me from Rowena Haas. Today's visit is a co-visit with PCP. Accompanied by group home RN. Today's visit is a follow-up MTM visit from 10/28/21     Reason for visit: medication recheck.    Allergies/ADRs: Reviewed in chart  Past Medical History: Reviewed in chart  Tobacco: She reports that she has never smoked. She has never  "used smokeless tobacco.  Alcohol: none    Medication Adherence/Access: no issues reported. custodial is managing her medications and administering her insulin. Receives medications from [facility].    Type 2 Diabetes:  Currently taking Novolog 6U three times daily with meals and Lantus 15U twice daily. Patient is not experiencing side effects.  Blood sugar monitoring: Continuous Glucose Monitor. Able to successfully switch to Dexcom and patient likes this device.  Ranges (from glucose log, unable to download today):   Date FBG/ 2hours post Lunch/2hours post Dinner /2hours post Bedtime   11/30 175 240/217     11/29 214 /221 205/195 277    184,143 191,200 242/173 154    133/115 169/234 /157 222   11/26    215   11/24 166 171/269      196  226/262 245      Symptoms of low blood sugar? none  Symptoms of high blood sugar? None  Eye exam: due  Foot exam: up to date  Diet/Exercise: continues to work on reducing sugar in her diet, less honey with her tea. [facility] healthier foods [facility] and exercising regularly with staff.  Aspirin: Taking 81mg daily   Statin: Yes: atorvastatin   ACEi/ARB: Yes: losartan  Urine Albumin:   Lab Results   Component Value Date    UMALCR 9.40 07/27/2021      Lab Results   Component Value Date    A1C 8.1 09/25/2021    A1C 10.0 08/10/2021    A1C 9.5 07/27/2021    A1C 9.1 12/01/2020    A1C 9.7 09/15/2020    A1C 8.6 06/30/2020    A1C 6.2 12/03/2019    A1C 6.6 08/06/2019       Sleep apnea: met with sleep medicine this morning, will be working with home medical to better fit her CPAP mask      Constipation: currently taking senna-docusate up to twice daily as needed.  When taking medication she has regular BM for several days, then when she stops medication her constipation returns. Concerned about very hard to pass stool after no BM x3+ days, \"push like I'm having a baby\" and having to manually self-extract BM.     Hip pain: will be scheduling MRI per sports medicine recommendation. " Pain has continued to be bothersome.     Schizoaffective: Currently taking escitalopram 10mg daily, Invega 9mg daily, amantadine 100mg AM and 200mg PM, trazodone 100mg HS, clonazepam 0.5mg HS, hydroxyzine 25mg at bedtime, mirtazapine 15mg at bedtime. Mood has been good. Spent the holiday with her son and family. Feels well supported in group home. Continues to go to [facility] [place] with exercise and diet support.  She has been sleeping better with mirtazapine 15mg at bedtime. However she complains of increased nightmares. Dreams are strange, different from the visions of the man visiting her bedroom (which has been treated well with low dose clonazepam).       Today's Vitals:   BP Readings from Last 1 Encounters:   11/30/21 134/77     Pulse Readings from Last 1 Encounters:   11/30/21 86     Wt Readings from Last 1 Encounters:   11/30/21 218 lb (98.9 kg)     Ht Readings from Last 1 Encounters:   11/30/21 5' (1.524 m)     Estimated body mass index is 42.58 kg/m  as calculated from the following:    Height as of an earlier encounter on 11/30/21: 5' (1.524 m).    Weight as of an earlier encounter on 11/30/21: 218 lb (98.9 kg).    Temp Readings from Last 1 Encounters:   11/30/21 97.5  F (36.4  C) (Temporal)    ----------------      I spent 45 minutes with this patient today. All changes were made via collaborative practice agreement with ART Fay CNP. A copy of the visit note was provided to the patient's primary care provider.    The patient was given a summary of these recommendations. See Provider note/AVS from today.     Eugenia De Jesus, PharmD, BCACP      Medication Therapy Recommendations  Chronic pain syndrome    Current Medication: topiramate (TOPAMAX) 200 MG tablet (Discontinued)   Rationale: Undesirable effect - Adverse medication event - Safety   Recommendation: Decrease Dose   Status: Accepted per Provider         Constipation, unspecified constipation type    Current Medication:  senna-docusate (SENOKOT-S/PERICOLACE) 8.6-50 MG tablet   Rationale: Frequency inappropriate - Dosage too low - Effectiveness   Recommendation: Increase Frequency   Status: Accepted per Provider         Type 2 diabetes mellitus with stage 3 chronic kidney disease, with long-term current use of insulin (H)    Current Medication: insulin glargine (LANTUS PEN) 100 UNIT/ML pen   Rationale: Dose too low - Dosage too low - Effectiveness   Recommendation: Increase Dose   Status: Accepted per Provider

## 2021-11-30 NOTE — PROGRESS NOTES
Assessment & Plan     Schizoaffective disorder, bipolar type (H)  Needs improvement especially at this time of the year.   - MENTAL HEALTH REFERRAL  - Adult; Outpatient Treatment, Psychiatry; Individual/Couples/Family/Group Therapy/Health Psychology; [facility] [phone]; We will contact you to schedule the appointment or please call with any questio...; Future    Psychophysiological insomnia  Schizoaffective disorder, depressive type (H)  Inadequately controlled. Medication changes started per Dr. Brothers psychiatry, follow-up with MTM for check in at 4 weeks.   - traZODone (DESYREL) 100 MG tablet; Take 1 tablet (100 mg) by mouth At Bedtime  - MENTAL HEALTH REFERRAL  - Adult; Outpatient Treatment, Psychiatry; Individual/Couples/Family/Group Therapy/Health Psychology; [facility] [phone]; We will contact you to schedule the appointment or please call with any questio...; Future    Type 2 diabetes mellitus with hyperglycemia, with long-term current use of insulin (H)  Type 2 diabetes mellitus with mild nonproliferative retinopathy without macular edema, with long-term current use of insulin, unspecified laterality (H)  Improving overall. Will adjust her Lantus to help with the morning BG.   - Hemoglobin A1c; Future  - insulin glargine (LANTUS PEN) 100 UNIT/ML pen; Inject 19 Units Subcutaneous 2 times daily    Other iron deficiency anemia  Needs improvement.   - CBC with platelets and differential; Future  - Iron and iron binding capacity; Future  - Ferritin; Future    Constipation, unspecified constipation type  Inadequately controlled. Daily scheduled senna-docusate with as needed dosing if still having symptoms.   - senna-docusate (SENOKOT-S/PERICOLACE) 8.6-50 MG tablet; Take 1 tablet daily and take an extra tablet with constipation.      Prescription drug management  I spent a total of 41 minutes on the day of the visit.   Time spent doing chart review, history and exam,  documentation and further activities per the note  {Provider  Link to [facility] Help Grid :060460}     Patient Instructions   - Increase Lantus to 19 units twice per day.   - Take senna-docusate 1 tablet daily and an extra tablet with constipation.   - Blood work ordered.   Call clinic with any questions or concerns.       Return in about 2 months (around 1/30/2022).    ART Fay CNP  [facility]    Surjit Barrett is a 61 year old who presents for the following health issues:   MTM: Eugenia MORENO     Diabetes: Patient report that she got a pass for [employer] and stayed overnight at her son's. Reports that the highest was 307, a couple of water bottles and walking helped with decreasing her blood sugar.   Still waking up with high blood sugars in the morning.   Lowest 110-115: gave up honey for the most part.   After lunch higher BG. Eating healthier.     Hypertension: BP's at the group home  [id number]'s. Patient denies any new symptoms.     Sleep: started taking combination mirtazapine and trazodone over the past few days. Will follow-up with MTM about medication adjustments. Goal is to reduce polypharmacy.   Medication changes with Dr. Brothers- started mirtazapine plus her daily trazodone.   Sleep medicine appointment this morning: Has a mask fit appointment next week and for the CPAP check.   - Will follow-up with Dr. Goltz.     Constipation increased. Some black stool, sometimes has to help herself go to the bathroom after 3-4 days of no bowel movement. Only prn stool softerner.     Mental Health: Some low mood especially with the holiday season. Interested in doing therapy. Will get a referral done to start off this process.     Review of Systems   Constitutional, HEENT, cardiovascular, pulmonary, gi and gu systems are negative, except as otherwise noted.      Objective    /77 (BP Location: Right arm, Patient Position: Sitting, Cuff Size: Adult  Regular)   Pulse 86   Temp 97.5  F (36.4  C) (Temporal)   Ht 1.524 m (5')   Wt 98.9 kg (218 lb)   LMP 01/06/2015 (Exact Date)   SpO2 97%   BMI 42.58 kg/m    Body mass index is 42.58 kg/m .     Physical Exam   GENERAL: healthy, alert and no distress  EYES: Eyes grossly normal to inspection, PERRL and conjunctivae and sclerae normal  NECK: no adenopathy, no asymmetry, masses, or scars and thyroid normal to palpation  RESP: lungs clear to auscultation - no rales, rhonchi or wheezes  CV: regular rate and rhythm, normal S1 S2, no S3 or S4, no murmur, click or rub, no peripheral edema and peripheral pulses strong  ABDOMEN: soft, nontender, no hepatosplenomegaly, no masses and bowel sounds normal  MS: no gross musculoskeletal defects noted, no edema  NEURO: Normal strength and tone, mentation intact and speech normal    Labs Reviewed.

## 2021-12-01 DIAGNOSIS — E11.3299 TYPE 2 DIABETES MELLITUS WITH MILD NONPROLIFERATIVE RETINOPATHY WITHOUT MACULAR EDEMA, WITH LONG-TERM CURRENT USE OF INSULIN, UNSPECIFIED LATERALITY (H): ICD-10-CM

## 2021-12-01 DIAGNOSIS — Z79.4 TYPE 2 DIABETES MELLITUS WITH MILD NONPROLIFERATIVE RETINOPATHY WITHOUT MACULAR EDEMA, WITH LONG-TERM CURRENT USE OF INSULIN, UNSPECIFIED LATERALITY (H): ICD-10-CM

## 2021-12-01 NOTE — TELEPHONE ENCOUNTER
"Requested Prescriptions   Signed Prescriptions Disp Refills    insulin pen needle (NOVOFINE 30) 30G X 8 MM miscellaneous 400 each 1     Sig: USE 4 DAILY OR AS DIRECTED       Diabetic Supplies Protocol Passed - 12/1/2021  9:28 AM        Passed - Medication is active on med list        Passed - Patient is 18 years of age or older        Passed - Recent (6 mo) or future (30 days) visit within the authorizing provider's specialty     Patient had office visit in the last 6 months or has a visit in the next 30 days with authorizing provider.  See \"Patient Info\" tab in inbasket, or \"Choose Columns\" in Meds & Orders section of the refill encounter.               Thanks,  DAVIN Young  [facility]     "

## 2021-12-03 DIAGNOSIS — E08.3292: ICD-10-CM

## 2021-12-03 DIAGNOSIS — E11.3299 TYPE 2 DIABETES MELLITUS WITH MILD NONPROLIFERATIVE RETINOPATHY (H): Primary | ICD-10-CM

## 2021-12-07 ENCOUNTER — ANCILLARY PROCEDURE (OUTPATIENT)
Dept: MRI IMAGING | Facility: CLINIC | Age: 61
End: 2021-12-07
Attending: FAMILY MEDICINE
Payer: COMMERCIAL

## 2021-12-07 DIAGNOSIS — M46.1 SI (SACROILIAC) JOINT INFLAMMATION (H): ICD-10-CM

## 2021-12-07 DIAGNOSIS — M25.551 PAIN IN JOINT INVOLVING RIGHT PELVIC REGION AND THIGH: ICD-10-CM

## 2021-12-07 PROCEDURE — 72195 MRI PELVIS W/O DYE: CPT | Performed by: RADIOLOGY

## 2021-12-08 ENCOUNTER — VIRTUAL VISIT (OUTPATIENT)
Dept: BEHAVIORAL HEALTH | Facility: CLINIC | Age: 61
End: 2021-12-08
Payer: COMMERCIAL

## 2021-12-08 ENCOUNTER — TELEPHONE (OUTPATIENT)
Dept: FAMILY MEDICINE | Facility: CLINIC | Age: 61
End: 2021-12-08
Payer: COMMERCIAL

## 2021-12-08 ENCOUNTER — DOCUMENTATION ONLY (OUTPATIENT)
Dept: SLEEP MEDICINE | Facility: CLINIC | Age: 61
End: 2021-12-08
Payer: COMMERCIAL

## 2021-12-08 DIAGNOSIS — F25.1 SCHIZOAFFECTIVE DISORDER, DEPRESSIVE TYPE (H): Primary | ICD-10-CM

## 2021-12-08 DIAGNOSIS — F20.89 OTHER SCHIZOPHRENIA (H): ICD-10-CM

## 2021-12-08 PROCEDURE — 90832 PSYTX W PT 30 MINUTES: CPT | Mod: GT

## 2021-12-08 RX ORDER — ESCITALOPRAM OXALATE 10 MG/1
10 TABLET ORAL DAILY
Qty: 30 TABLET | Refills: 3 | Status: SHIPPED | OUTPATIENT
Start: 2021-12-08 | End: 2021-12-17

## 2021-12-08 NOTE — TELEPHONE ENCOUNTER
Reason for Call:  Medication or medication refill:    Do you use a [facility]?  Name of the pharmacy and phone number for the current request:  [facility], [place], operated [id number] [address] (Pharmacy)  P: [phone]  F: [phone]    Name of the medication requested: escitalopram (LEXAPRO) 10 MG tablet    Other request: na    Can we leave a detailed message on this number? YES    Phone number patient can be reached at: Home number on file [phone] (home)    Best Time: ANY    Call taken on 12/8/2021 at 10:11 AM by Aniya De Anda

## 2021-12-08 NOTE — TELEPHONE ENCOUNTER
Reason for Call:  Form, our goal is to have forms completed with 72 hours, however, some forms may require a visit or additional information.    Type of letter, form or note:  medical-STATEMENT OF CERTIFYING PHYSICAN  FOR DIABETIC THERAPEUTIC SHOES    Who is the form from?: [facility] (Other)    Where did the form come from: form was faxed in    What clinic location was the form placed at?: [facility]    Where the form was placed: Rowena Haas's box    What number is listed as a contact on the form?: fax [phone]       Additional comments: Please review, sign and return the certifying statement     Call taken on 12/8/2021 at 7:40 AM by Cheryl Beltran

## 2021-12-08 NOTE — TELEPHONE ENCOUNTER
"Requested Prescriptions   Signed Prescriptions Disp Refills    escitalopram (LEXAPRO) 10 MG tablet 30 tablet 3     Sig: Take 1 tablet (10 mg) by mouth daily       SSRIs Protocol Passed - 12/8/2021 10:13 AM        Passed - Recent (12 mo) or future (30 days) visit within the authorizing provider's specialty     Patient has had an office visit with the authorizing provider or a provider within the authorizing providers department within the previous 12 mos or has a future within next 30 days. See \"Patient Info\" tab in inbasket, or \"Choose Columns\" in Meds & Orders section of the refill encounter.              Passed - Medication is active on med list        Passed - Patient is age 18 or older        Passed - No active pregnancy on record        Passed - No positive pregnancy test in last 12 months           Hannah Vu RN  [facility]     "

## 2021-12-08 NOTE — PROGRESS NOTES
"[facility]  December 8, 2021    The patient has been notified of the following:      \"We have found that certain health care needs can be provided without the need for a face to face visit.  This service lets us provide the care you need with a phone conversation.       I will have full access to your [place] medical record during this entire phone call.   I will be taking notes for your medical record.      Since this is like an office visit, we will bill your insurance company for this service.       There are potential benefits and risks of telephone visits (e.g. limits to patient confidentiality) that differ from in-person visits.?  Confidentiality still applies for telephone services, and nobody will record the visit.  It is important to be in a quiet, private space that is free of distractions (including cell phone or other devices) during the visit.??      If during the course of the call I believe a telephone visit is not appropriate, you will not be charged for this service\"     Consent has been obtained for this service by care team member: Yes       Behavioral Health Clinician Progress Note    Patient Name: Nena Tang           Service Type:  Individual      Service Location:   Phone call (patient / identified key support person reached)     Session Start Time: 3:32 pm  Session End Time:  3:54 pm      Session Length: 16 - 37      Attendees: Patient    Visit Activities (Refresh list every visit): [facility] [place] Only and Phone Encounter     Diagnostic Assessment Date: 1/23/18, Updated 12/12/19 - DA due to be updated   Treatment Plan Review Date: 3/8/21   CGI: 7/29/21    See Flowsheets for today's PHQ-9 and TAMIA-7 results  Previous PHQ-9:   PHQ-9 SCORE 5/7/2019 12/1/2020 11/15/2021   PHQ-9 Total Score - - -   PHQ-9 Total Score - - -   PHQ-9 Total Score 22 13 0     Previous TAMIA-7:   TAMIA-7 SCORE 2/3/2017 3/13/2018 10/26/2018   Total Score - - -   Total Score 0 17 19   Total " "Score - - -       ALEXANDRA LEVEL:  ALEXANDRA Score (Last Two) 8/30/2011 6/9/2014   ALEXANDRA Raw Score 42 37   Activation Score 66 49.9   ALEXANDRA Level 3 2       DATA  Extended Session (60+ minutes): No  Interactive Complexity: No  Crisis: No  [facility] Patient: No    Treatment Objective(s) Addressed in This Session:  Target Behavior(s): disease management/lifestyle changes mental health    Depressed Mood: Decrease frequency and intensity of feeling down, depressed, hopeless  Improve quantity and quality of night time sleep / decrease daytime naps  Feel less tired and more energy during the day     Current Stressors / Issues:  [facility] visit with patient to establish care. She reported that her primary concerns are diabetes management and depression. Patient shared that her depression has been \"up and down\". She stated that she hasn't been sleeping well, so she has returned to the sleep clinic which has been helpful for her in the past. She stated that she has been walking as much as she can with her hip pain. Patient reported she has been eating more vegetables to help with her blood sugars. [facility] affirmed steps she is taking to manage physical health and discussed with patient the relationship between physical and mental health. Patient shared that she enjoys going to the drop-in center to keep herself busy and socially connected. She stated that she would like to move into her own apartment next spring or summer and noted that her  is searching for options. She stated that she does not have anything else to address during this visit, though she would like for this [facility] to join a co-visit when she meets with her primary care providers in one month. Patient expressed interest in checking in with this [facility], as needed, while she waits to start regular therapy. Denied hallucinations, SI, SIB, and safety concerns.    Progress on Treatment Objective(s) / Homework:  Minimal progress - PREPARATION (Decided to change - considering how); Intervened " by negotiating a change plan and determining options / strategies for behavior change, identifying triggers, exploring social supports, and working towards setting a date to begin behavior change    Motivational Interviewing    MI Intervention: Expressed Empathy/Understanding, Supported Autonomy, Collaboration, Evocation, Open-ended questions, Reflections: simple and complex and Change talk (evoked)     Change Talk Expressed by the Patient: Desire to change Ability to change Reasons to change Activation Taking steps    Provider Response to Change Talk: E - Evoked more info from patient about behavior change, A - Affirmed patient's thoughts, decisions, or attempts at behavior change, R - Reflected patient's change talk and S - Summarized patient's change talk statements      Care Plan review completed: No    Medication Review:  Recent changes, see medication list in Epic     Medication Compliance:  Yes    Changes in Health Issues:   None reported     Chemical Use Review:   Substance Use: Chemical use reviewed, no active concerns identified      Tobacco Use: No current tobacco use.      Assessment: Current Emotional / Mental Status (status of significant symptoms):  Risk status (Self / Other harm or suicidal ideation)  Patient has had a history of suicidal ideation: pt reports hx of SI, severe episodes following the death of her , mother, and sister and suicide attempts: 3 attempted overdose on insulin   Patient denies current fears or concerns for personal safety.  Patient denies current or recent suicidal ideation or behaviors.  Patient denies current or recent homicidal ideation or behaviors.  Patient denies current or recent self injurious behavior or ideation.  Patient denies other safety concerns.  A safety and risk management plan has been developed including: Patient consented to co-developed safety plan.  A safety and risk management plan was completed.  Patient agreed to use safety plan should any  safety concerns arise.  A copy was given to the patient.    Appearance:  Appropriate   Eye Contact:  Good   Psychomotor Behavior: Normal   Attitude:   Cooperative  Friendly  Orientation:  All  Speech   Rate / Production: Normal    Volume:  Normal   Mood:   Depressed   Affect:   Restricted   Thought Content:  Clear   Thought Form:  Coherent  Logical   Insight:   Fair     Diagnoses:  1. Schizoaffective disorder, depressive type (H)    New diagnostic assessment is due    Collateral Reports Completed:  Not Applicable    Plan: (Homework, other):  Patient was given information about behavioral services and encouraged to schedule a follow up appointment with the clinic [facility] as needed. Mental health referral was placed by patient's PCP. [facility] will meet with patient in co-visit to discuss support needs in one month. She was also given information about mental health symptoms and treatment options . CD Recommendations: Maintain Sobriety.     Ambika Orellana [facility] Integrated Primary Care Treatment Plan    Client's Name: Nena Tang  YOB: 1960    Date: 12/8/21    DSM5 Diagnoses: (Sustained by DSM5 Criteria Listed Above)  Diagnoses:  295.70 (F25.0), Schizoaffective disorder, depressive type; 309.81 (F43.10) Posttraumatic Stress Disorder, history of polysubstance use  Psychosocial & Contextual Factors: living in group home, strong family support, working on diabetes management      Referral / Collaboration: Mental health referral for outpatient therapy has been placed by patient's PCP.    Anticipated number of session or this episode of care: 3-5      Measurable Treatment Goal(s) related to diagnosis / functional impairment(s)  Goal 1: Client will improve her ability to manage her health needs.     Objective #A (Client Action)    Client will Increase interest, engagement, and pleasure in doing things  Decrease frequency and intensity of feeling down,  depressed, hopeless  Improve diet, appetite, mindful eating, and / or meal planning  Identify negative self-talk and behaviors: challenge core beliefs, myths, and actions  Improve management of diabetes.  Status: Continued - Date(s): 12/8/21   Intervention(s)  Therapist will teach emotional regulation and goal setting skills using solution focused therapy.      Client has reviewed and agreed to the above plan.      Ambika Orellana, [facility], [facility] 12/8/21           Integrated Behavioral Health Services                                      Patient's Name: Nena Tang  July 29, 2021    SAFETY PLAN:  Step 1: Warning signs / cues (Thoughts, images, mood, situation, behavior) that a crisis may be developing:    Mood: worsening depression, agitation and loneliness    Behaviors: isolating/withdrawing   Step 2: Coping strategies - Things I can do to take my mind off of my problems without contacting another person (relaxation technique, physical activity):    Distress Tolerance Strategies:  spend time with my grandchildren, play bingo, go to the [employer]    Physical Activities: meditation and go for a walk in the park    Focus on helpful thoughts:  remind myself of what is important to me: my grandchildren  Step 3: People and social settings that provide distraction:   Name: April  Phone: [phone]   Name: Shell Phone: [phone]    park and [employer]   Step 4: Remind myself of people and things that are important to me and worth living for:  My grandchildren, my children  Step 5: When I am in crisis, I can ask these people to help me use my safety plan:   Name: April  Phone: [phone]   Name: [employer] Phone: [phone]  Step 6: Making the environment safe:     secure medications: give medications to my son and be around others  Step 7: Professionals or agencies I can contact during a crisis:    Suicide Prevention Lifeline: 8-158-557-TALK (4109)    Crisis Text Line Service: Text   [place]  to 129627.    [facility] Crisis  Services:  [phone]    Call 911 or go to my nearest emergency department.   I helped develop this safety plan and agree to use it when needed.  I have been given a copy of this plan.      Patient signature: _______________________________________________________________  Today s date:  July 29, 2021  Adapted from Safety Plan Template 2008 Damaris Lowry and Bossman Jasmine is reprinted with the express permission of the authors.  No portion of the Safety Plan Template may be reproduced without the express, written permission.  You can contact the authors at [email or web address] or [email or web address].

## 2021-12-09 ENCOUNTER — MEDICAL CORRESPONDENCE (OUTPATIENT)
Dept: HEALTH INFORMATION MANAGEMENT | Facility: CLINIC | Age: 61
End: 2021-12-09

## 2021-12-09 ENCOUNTER — OFFICE VISIT (OUTPATIENT)
Dept: OPHTHALMOLOGY | Facility: CLINIC | Age: 61
End: 2021-12-09
Attending: OPHTHALMOLOGY
Payer: COMMERCIAL

## 2021-12-09 DIAGNOSIS — Z79.4 TYPE 2 DIABETES MELLITUS WITH HYPERGLYCEMIA, WITH LONG-TERM CURRENT USE OF INSULIN (H): ICD-10-CM

## 2021-12-09 DIAGNOSIS — E11.65 TYPE 2 DIABETES MELLITUS WITH HYPERGLYCEMIA, WITH LONG-TERM CURRENT USE OF INSULIN (H): ICD-10-CM

## 2021-12-09 DIAGNOSIS — E08.3292: ICD-10-CM

## 2021-12-09 PROCEDURE — 99214 OFFICE O/P EST MOD 30 MIN: CPT | Performed by: OPHTHALMOLOGY

## 2021-12-09 PROCEDURE — 92134 CPTRZ OPH DX IMG PST SGM RTA: CPT | Performed by: OPHTHALMOLOGY

## 2021-12-09 PROCEDURE — G0463 HOSPITAL OUTPT CLINIC VISIT: HCPCS

## 2021-12-09 PROCEDURE — 92015 DETERMINE REFRACTIVE STATE: CPT

## 2021-12-09 ASSESSMENT — REFRACTION_WEARINGRX
OD_ADD: +2.50
OD_CYLINDER: +1.00
OS_AXIS: 080
OD_SPHERE: PLANO
OS_SPHERE: -1.75
OS_CYLINDER: +2.00
OS_ADD: +2.50
OD_AXIS: 172

## 2021-12-09 ASSESSMENT — REFRACTION_MANIFEST
OD_AXIS: 160
OD_CYLINDER: +0.25
OS_CYLINDER: +1.50
OS_AXIS: 083
OS_SPHERE: -1.00
OD_ADD: +2.25
OS_ADD: +2.25
OD_SPHERE: +0.50

## 2021-12-09 ASSESSMENT — CUP TO DISC RATIO
OD_RATIO: 0.3
OS_RATIO: 0.3

## 2021-12-09 ASSESSMENT — VISUAL ACUITY
OD_SC: 20/30
OD_PH_SC: 20/25
OS_SC+: -1
OS_SC: 20/50
OD_SC+: -2
METHOD: SNELLEN - LINEAR
OS_PH_SC: 20/40

## 2021-12-09 ASSESSMENT — EXTERNAL EXAM - RIGHT EYE: OD_EXAM: NORMAL

## 2021-12-09 ASSESSMENT — SLIT LAMP EXAM - LIDS
COMMENTS: NORMAL
COMMENTS: NORMAL

## 2021-12-09 ASSESSMENT — EXTERNAL EXAM - LEFT EYE: OS_EXAM: NORMAL

## 2021-12-09 ASSESSMENT — TONOMETRY
IOP_METHOD: TONOPEN
OS_IOP_MMHG: 13
OD_IOP_MMHG: 14

## 2021-12-09 ASSESSMENT — CONF VISUAL FIELD
METHOD: COUNTING FINGERS
OD_NORMAL: 1
OS_NORMAL: 1

## 2021-12-09 NOTE — PROGRESS NOTES
Patient came to [facility] showroom  for mask fitting appointment on December 8, 2021. Patient requested to switch masks because poor seal/leak. Discussed the following masks: Airfit/Airtouch F20 (patient currently has size medium but I think it is leaking because it is too large), Airfit F30i. Patient selected a Resmed Mask name: Airfit F30i Full Face mask size Wide

## 2021-12-09 NOTE — NURSING NOTE
Chief Complaints and History of Present Illnesses   Patient presents with     Diabetic Retinopathy Follow Up     Chief Complaint(s) and History of Present Illness(es)     Diabetic Retinopathy Follow Up     Laterality: both eyes    Onset: gradual    Onset: 2 weeks ago    Quality: blurred vision    Associated symptoms: floaters.  Negative for flashes    Treatments tried: no treatments    Pain scale: 0/10              Comments     Follow up for macular edema.  The patient notes increased blurriness in both eyes for two weeks.  The patient notes she has bilateral floaters.  Samreen Ward COA, COA 3:16 PM 12/09/2021

## 2021-12-09 NOTE — PROGRESS NOTES
I have confirmed the patient's CC, HPI and reviewed Past Medical History, Past Surgical History, Social History, Family History, Problem List, Medication List and agree with Tech note.     CC: Follow up Diabetes mellitus retinopathy     Interval history: [facility] seems blurrier both eyes. S/p focal laser OS with Dr. Payan at last visit 4/25/18. Marcy flashes or new floaters.     HPI: 57YO F Hx of DMII here for diabetic exam. DMII x 13 years. On oral and insulin. Last hgA1c 6.2% in 5/22/18. Glucose under more control. Hoping to get cataract surgery with Dr. Diaz now.     OCT Macula today  OD: No center involving intra-retinal fluid   OS: no me however poor image quality due to dry eye      Assessment/plan   1. Severe NPDR with Diabetic macular edema both eyes              - improved diabetes control, her last A1C is 8.1 in 8/2021 down from 10.1       2. Dry eye syndrome               Artificial tears over the counter               Monitor         3. Myopia OS with secondary amblyopia              Does not wear glasses normally      4.  Pseudophakia both eyes               - Mild PCO in both eyes               - Monitor              Return 9 months for exam and OCT OU       Tiburcio Martin MD PhD.  Professor & Chair.

## 2021-12-13 ENCOUNTER — OFFICE VISIT (OUTPATIENT)
Dept: ORTHOPEDICS | Facility: CLINIC | Age: 61
End: 2021-12-13
Payer: COMMERCIAL

## 2021-12-13 VITALS — BODY MASS INDEX: 42.8 KG/M2 | HEIGHT: 60 IN | WEIGHT: 218 LBS

## 2021-12-13 DIAGNOSIS — M53.3 SACROILIAC JOINT DYSFUNCTION: Primary | ICD-10-CM

## 2021-12-13 PROCEDURE — 99213 OFFICE O/P EST LOW 20 MIN: CPT | Performed by: FAMILY MEDICINE

## 2021-12-13 ASSESSMENT — MIFFLIN-ST. JEOR: SCORE: 1480.34

## 2021-12-13 NOTE — LETTER
12/13/2021      RE: Nena Tang  [address] [place] [address]       S: fup MRI pelvis results.  Results normal, no bony lesion, no insufficiency fx, no active sacroilitis:    Patient has been specific with focal right-sided posterior hemipelvis pain over the last year.  She points to the area of the right SI joint as her area of discomfort.  It is painful her to roll over in bed onto the hip.  It is painful for her to get out of a chair and start to walk.  In the last 7 months its been increasingly difficult for her to make motions to rise from a chair without significant pain.  She currently lives in a group home.  She does see physical therapy twice a week.  She has seen the pain clinic consistently for this issue over the last year.  She had a CT scan of her abdomen and pelvis that did not find obvious pelvic findings.  She had an x-ray of her hips and pelvis that did not show significant DJD of the SI joint or hips.  She had an MRI of her lumbar spine that showed no evidence of radicular impingement.  She had both lumbar facet and SI joint injections.  She had SI joint injections in April and were repeated  again in August.  Patient indicates that the SI injections helped for about a week and made it easier to walk and the problem hours recurred.  The last injections in August were both lumbar facet injections as well as an SI joint injection.  She ended up with an elevated blood sugar that prompted a hospitalization. She has had an elevated CRP, she has a history of uterine cancer 2016.  The most recent CRP elevations however, 2 months ago, at the time of a hospitalization for acute respiratory illness/pneumonitis.  She has recently normal kidney function tests.      She has seen rheumatology in the past, last seen in 2019, for history of positive AIDA with centromere pattern.    Through pain clinic pt had right SI joint and right lower facet joint procedure 8/2/21.  She reported good  "improvement with her pain for a few days, but it wore off.  Her blood sugars significantly elevated and she was hospitalized      Pain clinic note Dr. Paz 11/2/21 reviewed, below:  \"Current pain medications include:  - Gabapentin 300 mg at bedtime -  sedation so concerned about adding in daytime doses.  - Topamax 200 daily- at higher dose, had runny stool        Previous medication treatments included:  - Tylenol 650 mg prn - no benefit  - Diclofenac gel - does not take   - Flexeril - no benefit  - Robaxin (methocarbamol) 1000mg dose- not helpful  - Diclofenac gel QID- not using     H/o Diabetes, positive AIDA, infectious encephalopathy, tardive dyskinesia, retinopathy, migraine headaches, schizoaffective disorder, chronic low back pain, sleep apnea, coronary artery disease, restless legs, rotator cuff syndrome, B12 deficiency, anemia, coronary artery disease, overweight, history of episodic cocaine abuse          MR pelvis without contrast 12/8/2021 7:48 AM     Techniques: Multiplanar multisequence imaging of the pelvis was  obtained without  administration of  intravenous contrast using  routing [facility] protocol.     History: Bone lesion, pelvis, malignancy suspected; MRI pelvis, with  protocol focus on ST joint/sacrum.  Pt h/o right posterior  hemipelvis/SI joint pain x one year, neg xray. h/o elevated CRP, h/o  uterine cancer; Pain in joint involving right pelvic region and thigh;  SI (sacroiliac) joint inflammation (H)      Comparison: CT 6/15/2021     Findings:     Osseous structures  Osseous structures: No evidence of subchondral edema-like marrow  signal intensity, erosion, joint effusion, or periarticular  inflammation at the either sacroiliac joint to suggest active  sacroiliitis. No evidence of subchondral fatty marrow replacement, MRI  findings to suggest sclerosis, or ankylosis to indicate sequelae of  chronic sacroiliitis.     No fracture, or focal osseous lesion is seen.     Internal derangement of " joints are not well assessed owing to chosen  field of view.     Joint and Periarticular soft tissue:     Joint effusion: A physiologic amount of joint fluid in partially  visualized bilateral hip.     Muscles and tendons  Muscles and tendons: Piriformis muscle bulk are symmetric.     Nerves:  The visualized course of the sciatic nerves are unremarkable  bilaterally.     Other Findings:  Dependent subcutaneous edema posteriorly.                                                                      Impression:  1. No abnormality to explain patient symptom. Specifically, no MRI  evidence of acute or chronic sequelae sacroiliitis. No osseous lesion  or insufficiency fracture.     ELIJAH BRAYDON           PMH:  Past Medical History:   Diagnosis Date     Acute respiratory failure with hypoxia (H) 9/4/2017     CAD (coronary artery disease)     5/2014 cath, nonbostructive stenosis to LAD, RCA.     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/2011     Noncompliance with medication regimen 6/8/2011     Obesity      CINDY (obstructive sleep apnea) 3/8/2012    uses CPAP     CINDY (obstructive sleep apnea)- mild AHI 10.3      Osteopenia 10/7/2009     Pneumonia of right lower lobe due to infectious organism 9/4/2017     Schizoaffective disorder, depressive type (H) 2/25/2013     Sepsis (H) 8/29/2017     Suicidal intent 10/2/2013     Takotsubo cardiomyopathy      Type 2 diabetes mellitus (H) 8/30/2011     Uterine cancer (H) 1983     Verbal auditory hallucination 10/4/2012       Active problem list:  Patient Active Problem List   Diagnosis     Osteopenia     Vitamin B12 deficiency without anemia     Hyperlipidemia  LDL goal <100     Rotator cuff syndrome     Type 2 diabetes mellitus with mild nonproliferative retinopathy (H)     Illiterate     Irritable bowel syndrome     overweight - BMI >35     Takotsubo cardiomyopathy     CAD (coronary artery disease)     Restless legs syndrome (RLS)     CINDY (obstructive sleep apnea)- mild AHI 10.3     Verbal auditory hallucination     Chronic low back pain     Schizoaffective disorder, depressive type (H)     Migraine headache     HTN, goal below 140/90     Health Care Home     Lumbago     Cervicalgia     Cocaine abuse, episodic (H)     Suicidal ideation     Esophageal reflux     Mild nonproliferative diabetic retinopathy (H)     Tardive dyskinesia     Alcohol use     Left cataract     Falls frequently     History of uterine cancer     Psychophysiological insomnia     Dysuria     Asymptomatic postmenopausal status     Abdominal pain, right lower quadrant     Infectious encephalopathy     Non-intractable vomiting with nausea     Thoughts of self harm     Gastroenteritis     Posttraumatic stress disorder     Cervical cancer screening     Type 2 diabetes mellitus with stage 3 chronic kidney disease, with long-term current use of insulin (H)     Positive AIAD (antinuclear antibody)     Hyperglycemia     Pneumonia     Depression with suicidal ideation     Mixed stress and urge urinary incontinence     Chronic pain syndrome     Fibromyalgia     Type 2 diabetes mellitus without complication, with long-term current use of insulin (H)     Dehydration     Hypoglycemia     Acute kidney injury (H)     Diarrhea, unspecified type     2019 novel coronavirus disease (COVID-19)     Hip pain, right       FH:  Family History   Problem Relation Age of Onset     Cancer Mother         BLADDER     Respiratory Mother         COPD     Gastrointestinal Disease Mother         CIRRHOSIS OF LI BOLIVAR     Alcohol/Drug Mother      Diabetes Mother      Hypertension Mother      Lipids Mother      C.A.D. Mother      Glaucoma  Mother      Alcohol/Drug Sister      Mental Illness Sister      Alcohol/Drug Sister      Psychotic Disorder Sister      Cancer Maternal Grandmother         UNKNOWN TYPE     Cancer Brother         COLON     Cancer - colorectal Brother         IN HIS LATE 30S     Alcohol/Drug Brother          OF HEROIN OVERDOSE AT AGE 22 YRS     Macular Degeneration No family hx of        SH:  Social History     Socioeconomic History     Marital status:      Spouse name: Not on file     Number of children: Not on file     Years of education: Not on file     Highest education level: Not on file   Occupational History     Not on file   Tobacco Use     Smoking status: Never Smoker     Smokeless tobacco: Never Used   Substance and Sexual Activity     Alcohol use: No     Comment: last month     Drug use: No     Comment: history of     Sexual activity: Never     Partners: Male     Birth control/protection: None, Condom   Other Topics Concern     Parent/sibling w/ CABG, MI or angioplasty before 65F 55M? Not Asked      Service No     Blood Transfusions No     Caffeine Concern No     Occupational Exposure No     Hobby Hazards No     Sleep Concern Yes     Stress Concern Yes     Weight Concern Yes     Special Diet Yes     Comment: DM     Back Care Yes     Exercise Yes     Comment: WALKS DAILY     Bike Helmet Not Asked     Seat Belt Yes     Self-Exams No     Comment: ENCOURAGED   Social History Narrative     10/2014. Has 2 sons. 7 grandchildren.         Unemployed. Graduated HS.         Tobacco use: Denies    Alcohol use: Escalated use since   10/2014    Drug: Denies     Social Determinants of Health     Financial Resource Strain: Not on file   Food Insecurity: Not on file   Transportation Needs: Not on file   Physical Activity: Not on file   Stress: Not on file   Social Connections: Not on file   Intimate Partner Violence: Not on file   Housing Stability: Not on file       MEDS:  See EMR, reviewed  ALL:  See  EMR, reviewed    REVIEW OF SYSTEMS:  CONSTITUTIONAL:NEGATIVE for fever, chills, change in weight  INTEGUMENTARY/SKIN: NEGATIVE for worrisome rashes, moles or lesions  EYES: NEGATIVE for vision changes or irritation  ENT/MOUTH: NEGATIVE for ear, mouth and throat problems  RESP:NEGATIVE for significant cough or SOB  BREAST: NEGATIVE for masses, tenderness or discharge  CV: NEGATIVE for chest pain, palpitations or peripheral edema  GI: NEGATIVE for nausea, abdominal pain, heartburn, or change in bowel habits  :NEGATIVE for frequency, dysuria, or hematuria  :NEGATIVE for frequency, dysuria, or hematuria  NEURO: NEGATIVE for weakness, dizziness or paresthesias  ENDOCRINE: NEGATIVE for temperature intolerance, skin/hair changes  HEME/ALLERGY/IMMUNE: NEGATIVE for bleeding problems  PSYCHIATRIC: NEGATIVE for changes in mood or affect        Objective: Straight leg raise is negative bilaterally.  Lower extremity strength to flexion extension at hip, knee, ankle including toe strength and foot evertor strength are 5-5 symmetrically bilaterally.  No numbness and tingling in the lower extremity.  Normal range of motion at the hips bilaterally.  Appropriate in conversation and affect.    Assessment: Chronic right-sided SI joint discomfort, suspect SI joint dysfunction    Plan: With her son present we went over the MRI results in detail.  Son indicates that she did have improvements with her symptoms with a physical therapy visit to [place].  A DME order for an SI joint belt was written.  Return referral to physical therapy in [place] was placed.  If the problem recurs despite this I suggested it may be reasonable for her to return to the pain clinic.  Perhaps she would tolerate additional SI joint injections if they were not done concomitantly with facet injections, which had a tendency to raise her blood sugars.    Getachew Lopez MD

## 2021-12-13 NOTE — PROGRESS NOTES
S: fup MRI pelvis results.  Results normal, no bony lesion, no insufficiency fx, no active sacroilitis:    Patient has been specific with focal right-sided posterior hemipelvis pain over the last year.  She points to the area of the right SI joint as her area of discomfort.  It is painful her to roll over in bed onto the hip.  It is painful for her to get out of a chair and start to walk.  In the last 7 months its been increasingly difficult for her to make motions to rise from a chair without significant pain.  She currently lives in a group home.  She does see physical therapy twice a week.  She has seen the pain clinic consistently for this issue over the last year.  She had a CT scan of her abdomen and pelvis that did not find obvious pelvic findings.  She had an x-ray of her hips and pelvis that did not show significant DJD of the SI joint or hips.  She had an MRI of her lumbar spine that showed no evidence of radicular impingement.  She had both lumbar facet and SI joint injections.  She had SI joint injections in April and were repeated  again in August.  Patient indicates that the SI injections helped for about a week and made it easier to walk and the problem hours recurred.  The last injections in August were both lumbar facet injections as well as an SI joint injection.  She ended up with an elevated blood sugar that prompted a hospitalization. She has had an elevated CRP, she has a history of uterine cancer 2016.  The most recent CRP elevations however, 2 months ago, at the time of a hospitalization for acute respiratory illness/pneumonitis.  She has recently normal kidney function tests.      She has seen rheumatology in the past, last seen in 2019, for history of positive AIDA with centromere pattern.    Through pain clinic pt had right SI joint and right lower facet joint procedure 8/2/21.  She reported good improvement with her pain for a few days, but it wore off.  Her blood sugars significantly  "elevated and she was hospitalized      Pain clinic note Dr. Paz 11/2/21 reviewed, below:  \"Current pain medications include:  - Gabapentin 300 mg at bedtime -  sedation so concerned about adding in daytime doses.  - Topamax 200 daily- at higher dose, had runny stool        Previous medication treatments included:  - Tylenol 650 mg prn - no benefit  - Diclofenac gel - does not take   - Flexeril - no benefit  - Robaxin (methocarbamol) 1000mg dose- not helpful  - Diclofenac gel QID- not using     H/o Diabetes, positive AIDA, infectious encephalopathy, tardive dyskinesia, retinopathy, migraine headaches, schizoaffective disorder, chronic low back pain, sleep apnea, coronary artery disease, restless legs, rotator cuff syndrome, B12 deficiency, anemia, coronary artery disease, overweight, history of episodic cocaine abuse          MR pelvis without contrast 12/8/2021 7:48 AM     Techniques: Multiplanar multisequence imaging of the pelvis was  obtained without  administration of  intravenous contrast using  routing [facility] protocol.     History: Bone lesion, pelvis, malignancy suspected; MRI pelvis, with  protocol focus on ST joint/sacrum.  Pt h/o right posterior  hemipelvis/SI joint pain x one year, neg xray. h/o elevated CRP, h/o  uterine cancer; Pain in joint involving right pelvic region and thigh;  SI (sacroiliac) joint inflammation (H)      Comparison: CT 6/15/2021     Findings:     Osseous structures  Osseous structures: No evidence of subchondral edema-like marrow  signal intensity, erosion, joint effusion, or periarticular  inflammation at the either sacroiliac joint to suggest active  sacroiliitis. No evidence of subchondral fatty marrow replacement, MRI  findings to suggest sclerosis, or ankylosis to indicate sequelae of  chronic sacroiliitis.     No fracture, or focal osseous lesion is seen.     Internal derangement of joints are not well assessed owing to chosen  field of view.     Joint and Periarticular soft " tissue:     Joint effusion: A physiologic amount of joint fluid in partially  visualized bilateral hip.     Muscles and tendons  Muscles and tendons: Piriformis muscle bulk are symmetric.     Nerves:  The visualized course of the sciatic nerves are unremarkable  bilaterally.     Other Findings:  Dependent subcutaneous edema posteriorly.                                                                      Impression:  1. No abnormality to explain patient symptom. Specifically, no MRI  evidence of acute or chronic sequelae sacroiliitis. No osseous lesion  or insufficiency fracture.     ELIJAHISRRAEL GARZAASHI           PMH:  Past Medical History:   Diagnosis Date     Acute respiratory failure with hypoxia (H) 9/4/2017     CAD (coronary artery disease)     5/2014 cath, nonbostructive stenosis to LAD, RCA.     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/2011     Noncompliance with medication regimen 6/8/2011     Obesity      CINDY (obstructive sleep apnea) 3/8/2012    uses CPAP     CINDY (obstructive sleep apnea)- mild AHI 10.3      Osteopenia 10/7/2009     Pneumonia of right lower lobe due to infectious organism 9/4/2017     Schizoaffective disorder, depressive type (H) 2/25/2013     Sepsis (H) 8/29/2017     Suicidal intent 10/2/2013     Takotsubo cardiomyopathy      Type 2 diabetes mellitus (H) 8/30/2011     Uterine cancer (H) 1983     Verbal auditory hallucination 10/4/2012       Active problem list:  Patient Active Problem List   Diagnosis     Osteopenia     Vitamin B12 deficiency without anemia     Hyperlipidemia LDL goal <100     Rotator cuff syndrome     Type 2 diabetes mellitus with mild nonproliferative  retinopathy (H)     Illiterate     Irritable bowel syndrome     overweight - BMI >35     Takotsubo cardiomyopathy     CAD (coronary artery disease)     Restless legs syndrome (RLS)     CINDY (obstructive sleep apnea)- mild AHI 10.3     Verbal auditory hallucination     Chronic low back pain     Schizoaffective disorder, depressive type (H)     Migraine headache     HTN, goal below 140/90     Health Care Home     Lumbago     Cervicalgia     Cocaine abuse, episodic (H)     Suicidal ideation     Esophageal reflux     Mild nonproliferative diabetic retinopathy (H)     Tardive dyskinesia     Alcohol use     Left cataract     Falls frequently     History of uterine cancer     Psychophysiological insomnia     Dysuria     Asymptomatic postmenopausal status     Abdominal pain, right lower quadrant     Infectious encephalopathy     Non-intractable vomiting with nausea     Thoughts of self harm     Gastroenteritis     Posttraumatic stress disorder     Cervical cancer screening     Type 2 diabetes mellitus with stage 3 chronic kidney disease, with long-term current use of insulin (H)     Positive AIDA (antinuclear antibody)     Hyperglycemia     Pneumonia     Depression with suicidal ideation     Mixed stress and urge urinary incontinence     Chronic pain syndrome     Fibromyalgia     Type 2 diabetes mellitus without complication, with long-term current use of insulin (H)     Dehydration     Hypoglycemia     Acute kidney injury (H)     Diarrhea, unspecified type     2019 novel coronavirus disease (COVID-19)     Hip pain, right       FH:  Family History   Problem Relation Age of Onset     Cancer Mother         BLADDER     Respiratory Mother         COPD     Gastrointestinal Disease Mother         CIRRHOSIS OF LI BOLIVAR     Alcohol/Drug Mother      Diabetes Mother      Hypertension Mother      Lipids Mother      C.A.D. Mother      Glaucoma Mother      Alcohol/Drug Sister      Mental Illness Sister      Alcohol/Drug Sister       Psychotic Disorder Sister      Cancer Maternal Grandmother         UNKNOWN TYPE     Cancer Brother         COLON     Cancer - colorectal Brother         IN HIS LATE 30S     Alcohol/Drug Brother          OF HEROIN OVERDOSE AT AGE 22 YRS     Macular Degeneration No family hx of        SH:  Social History     Socioeconomic History     Marital status:      Spouse name: Not on file     Number of children: Not on file     Years of education: Not on file     Highest education level: Not on file   Occupational History     Not on file   Tobacco Use     Smoking status: Never Smoker     Smokeless tobacco: Never Used   Substance and Sexual Activity     Alcohol use: No     Comment: last month     Drug use: No     Comment: history of     Sexual activity: Never     Partners: Male     Birth control/protection: None, Condom   Other Topics Concern     Parent/sibling w/ CABG, MI or angioplasty before 65F 55M? Not Asked      Service No     Blood Transfusions No     Caffeine Concern No     Occupational Exposure No     Hobby Hazards No     Sleep Concern Yes     Stress Concern Yes     Weight Concern Yes     Special Diet Yes     Comment: DM     Back Care Yes     Exercise Yes     Comment: WALKS DAILY     Bike Helmet Not Asked     Seat Belt Yes     Self-Exams No     Comment: ENCOURAGED   Social History Narrative     10/2014. Has 2 sons. 7 grandchildren.         Unemployed. Graduated HS.         Tobacco use: Denies    Alcohol use: Escalated use since   10/2014    Drug: Denies     Social Determinants of Health     Financial Resource Strain: Not on file   Food Insecurity: Not on file   Transportation Needs: Not on file   Physical Activity: Not on file   Stress: Not on file   Social Connections: Not on file   Intimate Partner Violence: Not on file   Housing Stability: Not on file       MEDS:  See EMR, reviewed  ALL:  See EMR, reviewed    REVIEW OF SYSTEMS:  CONSTITUTIONAL:NEGATIVE for fever, chills, change  in weight  INTEGUMENTARY/SKIN: NEGATIVE for worrisome rashes, moles or lesions  EYES: NEGATIVE for vision changes or irritation  ENT/MOUTH: NEGATIVE for ear, mouth and throat problems  RESP:NEGATIVE for significant cough or SOB  BREAST: NEGATIVE for masses, tenderness or discharge  CV: NEGATIVE for chest pain, palpitations or peripheral edema  GI: NEGATIVE for nausea, abdominal pain, heartburn, or change in bowel habits  :NEGATIVE for frequency, dysuria, or hematuria  :NEGATIVE for frequency, dysuria, or hematuria  NEURO: NEGATIVE for weakness, dizziness or paresthesias  ENDOCRINE: NEGATIVE for temperature intolerance, skin/hair changes  HEME/ALLERGY/IMMUNE: NEGATIVE for bleeding problems  PSYCHIATRIC: NEGATIVE for changes in mood or affect        Objective: Straight leg raise is negative bilaterally.  Lower extremity strength to flexion extension at hip, knee, ankle including toe strength and foot evertor strength are 5-5 symmetrically bilaterally.  No numbness and tingling in the lower extremity.  Normal range of motion at the hips bilaterally.  Appropriate in conversation and affect.    Assessment: Chronic right-sided SI joint discomfort, suspect SI joint dysfunction    Plan: With her son present we went over the MRI results in detail.  Son indicates that she did have improvements with her symptoms with a physical therapy visit to [place].  A DME order for an SI joint belt was written.  Return referral to physical therapy in [place] was placed.  If the problem recurs despite this I suggested it may be reasonable for her to return to the pain clinic.  Perhaps she would tolerate additional SI joint injections if they were not done concomitantly with facet injections, which had a tendency to raise her blood sugars.

## 2021-12-14 ENCOUNTER — OFFICE VISIT (OUTPATIENT)
Dept: PALLIATIVE MEDICINE | Facility: CLINIC | Age: 61
End: 2021-12-14
Payer: COMMERCIAL

## 2021-12-14 VITALS — SYSTOLIC BLOOD PRESSURE: 142 MMHG | OXYGEN SATURATION: 100 % | DIASTOLIC BLOOD PRESSURE: 81 MMHG | HEART RATE: 82 BPM

## 2021-12-14 ASSESSMENT — PAIN SCALES - GENERAL: PAINLEVEL: SEVERE PAIN (6)

## 2021-12-14 NOTE — LETTER
2021     Patient: Nena Tang  : 1960    Order: dplease facilitate and assist Nena daily in home exercises provided to her by physical therapist.        Shell Paz MD  [facility]

## 2021-12-14 NOTE — PROGRESS NOTES
[facility]    Date of visit: 12/14/2021     Chief complaint:   Chief Complaint   Patient presents with     Pain      Interval history:  Nena TINY Kitty was last seen by me on 11/2/21    Recommendations/plan at the last visit included:  1. Physical Therapy: continue HEP  2. Pain Psychologist to address issues of relaxation, behavioral change, coping style, and other factors important to improvement: patient agreeable to try to participate with pain psych.- yes, has been following with Dr. Kohler pain psychologist and Dr. Brothers psychiatrist for depression  3. Diagnostic Studies: none  4. Urine toxicology screen: none   5. Medication Management: No changes  6. Further procedures recommended: given her blood sugar changes, not offering at this time.  7. Ortho  referral- eval of hip  8. Other treatments: none  9. Recommendations/follow-up for PCP:  none  10. Follow up: 1 month    Since her last visit, Nena FRANKS Kitty reports  ***  -had right SI joint and right lower facet joint procedure 8/2/21  -she had good improvement with her pain for a few days, but it wore off  -her blood sugars significantly elevated and she was hospitalized   -she has trouble with walking, doing steps, and laying on her side.  -diabetes getting under better control.  Since being at [place], her pain is worse over time.  -done some physical hterapy.  -hard to stand.      Pain scores:  Severe Pain (6)     Current pain medications include:***  - Gabapentin 300 mg at bedtime -  sedation so concerned about adding in daytime doses.  - Topamax 200 daily- at higher dose, had runny stool      Previous medication treatments included:  - Tylenol 650 mg prn - no benefit  - Diclofenac gel - does not take   - Flexeril - no benefit  - Robaxin (methocarbamol) 1000mg dose- not helpful  - Diclofenac gel QID- not using    Other treatments have included:  Nena TINY Kitty has not been seen at a pain clinic in the past.     PT: yes, last session 6/2019 outpatient and most recent home PT a couple of months ago 3-4 sessions  Pain Psych: yes   Acupuncture: none  Chiropractor: none  TENs Unit: none  Injections:    - shoulder injection does not remember when - no benefit   - Right SIJ and Right GTB injection on 4/5/2021    Side Effects: None***    Medications:  Current Outpatient Medications   Medication Sig Dispense Refill     acetaminophen (TYLENOL) 325 MG tablet Take 650mg at bedtime scheduled and additional 650mg every 6 hours as needed, max 3000mg/day 200 tablet 3     albuterol (PROAIR HFA/PROVENTIL HFA/VENTOLIN HFA) 108 (90 Base) MCG/ACT inhaler Inhale 2 puffs into the lungs 4 times daily as needed for shortness of breath / dyspnea 8 g 0     alcohol swab prep pads Use to swab area of injection/rodolfo as directed. 100 each 3     amantadine (SYMMETREL) 100 MG capsule Take 1 capsule (100 mg)  in the morning and 2 capsules (200 mg) in the evening. (Patient taking differently: Take 100 mg by mouth daily Take 1 capsule (100 mg)  in the morning and 2 capsules (200 mg) in the evening.) 60 capsule 3     aspirin (ASA) 81 MG EC tablet TAKE 1 TABLET (81MG) BY MOUTH DAILY 90 tablet 1     atorvastatin (LIPITOR) 80 MG tablet TAKE 1 TABLET (80MG) BY MOUTH DAILY 30 tablet 11     benzonatate (TESSALON) 100 MG capsule Take 1 capsule (100 mg) by mouth 3 times daily as needed for cough 90 capsule 1     blood glucose (NO BRAND SPECIFIED) test strip Use to test blood sugar 10 times daily or as directed. 100 strip 11     clonazePAM (KLONOPIN) 0.5 MG tablet Take 1 tablet (0.5 mg) by mouth At Bedtime 30 tablet 0     Continuous Blood Gluc Sensor (DEXCOM G6 SENSOR) MISC Change every 10 days. 3 each 11     Continuous Blood Gluc Transmit (DEXCOM G6 TRANSMITTER) MISC Change every 3 months. 1 each 3     escitalopram (LEXAPRO) 10 MG tablet Take 1 tablet (10 mg) by mouth daily 30 tablet 3     famotidine (PEPCID) 20 MG tablet Take 1 tablet (20 mg) by mouth daily 90  tablet 3     gabapentin (NEURONTIN) 300 MG capsule Take 1 capsule (300 mg) by mouth At Bedtime 30 capsule 3     hydrOXYzine (VISTARIL) 25 MG capsule Take 1 capsule (25 mg) by mouth every 4 hours as needed for anxiety May take nightly for sleep (Patient taking differently: Take 25 mg by mouth At Bedtime ) 60 capsule 3     insulin aspart (NOVOLOG FLEXPEN) 100 UNIT/ML pen Inject 6 Units Subcutaneous 3 times daily (with meals) Hold for glucose less than 70. 30 mL 1     insulin glargine (LANTUS PEN) 100 UNIT/ML pen Inject 19 Units Subcutaneous 2 times daily 30 mL 1     insulin pen needle (NOVOFINE 30) 30G X 8 MM miscellaneous USE 4 DAILY OR AS DIRECTED 400 each 1     losartan (COZAAR) 100 MG tablet TAKE 1 TABLET (100MG) BY MOUTH DAILY 30 tablet 5     metoprolol succinate ER (TOPROL-XL) 25 MG 24 hr tablet Take 2 tablets (50 mg) by mouth daily (Patient taking differently: Take 50 mg by mouth daily In the AM) 180 tablet 3     mirtazapine (REMERON) 15 MG tablet Take 1 tablet (15 mg) by mouth At Bedtime 30 tablet 3     nystatin (MYCOSTATIN) 520576 UNIT/GM external powder Apply topically 2 times daily as needed 45 g 1     order for DME Equipment being ordered: Depends. 30 each 4     order for DME Equipment being ordered: CPAP Supplies. 1 each 0     paliperidone ER (INVEGA) 9 MG 24 hr tablet Take 1 tablet (9 mg) by mouth At Bedtime 30 tablet 2     pantoprazole (PROTONIX) 40 MG EC tablet Take 1 tablet (40 mg) by mouth daily 90 tablet 1     senna-docusate (SENOKOT-S/PERICOLACE) 8.6-50 MG tablet Take 1 tablet daily and take an extra tablet with constipation. 60 tablet 10     thin (NO BRAND SPECIFIED) lancets Use with lanceting device. To accompany: Blood Glucose Monitor Brands: per insurance. 100 each 6     topiramate (TOPAMAX) 200 MG tablet Take 1 tablet (200 mg) by mouth daily 30 tablet 3     traZODone (DESYREL) 100 MG tablet Take 1 tablet (100 mg) by mouth At Bedtime 90 tablet 1     TRULICITY 1.5 MG/0.5ML pen Inject 1.5 mg  Subcutaneous once a week Every Friday 4 mL 3     zinc oxide (DESITIN) 20 % external ointment Apply topically 3 times daily as needed for irritation (barrier cream) 60 g 3       Medical History: any changes in medical history since they were last seen? No***    Physical Exam:  BP (!) 142/81   Pulse 82   LMP 01/06/2015 (Exact Date)   SpO2 100%    Constitutional: Alert, no distress.   Head: normocephalic. Atraumatic.     Musculoskeletal exam:***  Gait/Station/Posture:  upright, antalgic. Wide based.  Right SI joint tenderness, right paraspinal tenderness   Some tenderness along the greater trochanteric bursa   Pain with range of motion of hip, including SHANON      Assessment:   1.  Widespread pain, she does meet 2010 fibromyalgia diagnostic criteria. Patient continues to endorse generalized tenderness, soreness and pain to low back, upper and lower extremities and shoulders. Currently on gabapentin, however unable to tolerate sedation SE and therefore only taking it at night. Would benefit from something less sedating.  2. Chronic low back pain- SI joint, likely some facet arthropathy as well.  3. Deconditioning -she has lost some weight, most likely due to current topiramate use.  However, patient unable to perform minimal tasks due to poor exercise tolerance      Plan:***  11. Physical Therapy: continue HEP  12. Pain Psychologist to address issues of relaxation, behavioral change, coping style, and other factors important to improvement: patient agreeable to try to participate with pain psych.- yes, has been following with Dr. Kohler pain psychologist and Dr. Brothers psychiatrist for depression  13. Diagnostic Studies: none  14. Urine toxicology screen: none   15. Medication Management: No changes  16. Further procedures recommended: given her blood sugar changes, not offering at this time.  17. Ortho  referral- eval of hip  18. Other treatments: none  19. Recommendations/follow-up for PCP:   none  20. Follow up: 1 month    *** minutes spent on the date of encounter doing chart review, history, and exam documentation and further activities as noted above.    Shell Paz MD  [facility]

## 2021-12-14 NOTE — PATIENT INSTRUCTIONS
1. Letter sent to have nursing facilitate and assist with home exercises provided from physical therapy.    2. I will send a message to Rowena Haas to see if she thinks we can do another steroid    3. In the meantime, please schedule physical therapy.    4. Schedule follow up after Jan 28th (when you see Rowena)

## 2021-12-17 ENCOUNTER — TELEPHONE (OUTPATIENT)
Dept: FAMILY MEDICINE | Facility: CLINIC | Age: 61
End: 2021-12-17
Payer: COMMERCIAL

## 2021-12-17 DIAGNOSIS — N18.30 TYPE 2 DIABETES MELLITUS WITH STAGE 3 CHRONIC KIDNEY DISEASE, WITH LONG-TERM CURRENT USE OF INSULIN, UNSPECIFIED WHETHER STAGE 3A OR 3B CKD (H): ICD-10-CM

## 2021-12-17 DIAGNOSIS — Z79.4 TYPE 2 DIABETES MELLITUS WITH STAGE 3 CHRONIC KIDNEY DISEASE, WITH LONG-TERM CURRENT USE OF INSULIN, UNSPECIFIED WHETHER STAGE 3A OR 3B CKD (H): ICD-10-CM

## 2021-12-17 DIAGNOSIS — F25.1 SCHIZOAFFECTIVE DISORDER, DEPRESSIVE TYPE (H): ICD-10-CM

## 2021-12-17 DIAGNOSIS — I25.119 CORONARY ARTERY DISEASE INVOLVING NATIVE HEART WITH ANGINA PECTORIS, UNSPECIFIED VESSEL OR LESION TYPE (H): ICD-10-CM

## 2021-12-17 DIAGNOSIS — E11.3299 TYPE 2 DIABETES MELLITUS WITH MILD NONPROLIFERATIVE RETINOPATHY WITHOUT MACULAR EDEMA, WITH LONG-TERM CURRENT USE OF INSULIN, UNSPECIFIED LATERALITY (H): ICD-10-CM

## 2021-12-17 DIAGNOSIS — F25.0 SCHIZOAFFECTIVE DISORDER, BIPOLAR TYPE (H): ICD-10-CM

## 2021-12-17 DIAGNOSIS — K21.9 GASTROESOPHAGEAL REFLUX DISEASE WITHOUT ESOPHAGITIS: ICD-10-CM

## 2021-12-17 DIAGNOSIS — F20.89 OTHER SCHIZOPHRENIA (H): ICD-10-CM

## 2021-12-17 DIAGNOSIS — E11.22 TYPE 2 DIABETES MELLITUS WITH STAGE 3 CHRONIC KIDNEY DISEASE, WITH LONG-TERM CURRENT USE OF INSULIN, UNSPECIFIED WHETHER STAGE 3A OR 3B CKD (H): ICD-10-CM

## 2021-12-17 DIAGNOSIS — Z79.4 TYPE 2 DIABETES MELLITUS WITH MILD NONPROLIFERATIVE RETINOPATHY WITHOUT MACULAR EDEMA, WITH LONG-TERM CURRENT USE OF INSULIN, UNSPECIFIED LATERALITY (H): ICD-10-CM

## 2021-12-17 RX ORDER — GLUCOSAMINE HCL/CHONDROITIN SU 500-400 MG
CAPSULE ORAL
Qty: 100 EACH | Refills: 3 | Status: SHIPPED | OUTPATIENT
Start: 2021-12-17 | End: 2022-04-12

## 2021-12-17 RX ORDER — PALIPERIDONE 9 MG/1
9 TABLET, EXTENDED RELEASE ORAL AT BEDTIME
Qty: 30 TABLET | Refills: 3 | Status: SHIPPED | OUTPATIENT
Start: 2021-12-17 | End: 2021-12-19

## 2021-12-17 RX ORDER — LOSARTAN POTASSIUM 100 MG/1
100 TABLET ORAL DAILY
Qty: 30 TABLET | Refills: 3 | Status: SHIPPED | OUTPATIENT
Start: 2021-12-17 | End: 2021-12-19

## 2021-12-17 RX ORDER — ESCITALOPRAM OXALATE 10 MG/1
10 TABLET ORAL DAILY
Qty: 30 TABLET | Refills: 3 | Status: SHIPPED | OUTPATIENT
Start: 2021-12-17 | End: 2022-05-05

## 2021-12-17 RX ORDER — CLONAZEPAM 0.5 MG/1
0.5 TABLET ORAL AT BEDTIME
Qty: 30 TABLET | Refills: 3 | Status: SHIPPED | OUTPATIENT
Start: 2021-12-17 | End: 2022-04-12

## 2021-12-17 NOTE — TELEPHONE ENCOUNTER
Reason for Call:  Medication or medication refill:    Do you use a [facility]?  Name of the pharmacy and phone number for the current request: [id number] - [place] [address]     Name of the medication requested: clonazePAM (KLONOPIN) 0.5 MG tablet  losartan (COZAAR) 100 MG tablet  paliperidone ER (INVEGA) 9 MG 24 hr tablet  escitalopram (LEXAPRO) 10 MG tablet  alcohol swab prep pads  blood glucose (NO BRAND SPECIFIED) test strip    Other request: PT IS OUT OF MEDICATIONS NEEDS BEFORE Monday (PHARMACY CLOSES AT 4PM, NEED ORDERS BEFORE NOON).     Can we leave a detailed message on this number? YES    Phone number patient can be reached at: Home number on file [phone] (home)    Best Time: ANY    Call taken on 12/17/2021 at 8:31 AM by Chelsea Chino

## 2021-12-17 NOTE — TELEPHONE ENCOUNTER
Reason for Call:  Medication or medication refill:    Do you use a [facility]?  Name of the pharmacy and phone number for the current request:      [id number] - [place] [address]    Name of the medication requested: losartan (COZAAR) 100 MG tablet   paliperidone ER (INVEGA) 9 MG 24 hr tablet     Other request: Pt's rep would like to send out medication today    Can we leave a detailed message on this number? YES    Phone number patient can be reached at: Home number on file [phone] (home)    Best Time: Anytime    Call taken on 12/17/2021 at 1:35 PM by Edward Wilcox MA

## 2021-12-17 NOTE — TELEPHONE ENCOUNTER
"Requested Prescriptions   Pending Prescriptions Disp Refills     clonazePAM (KLONOPIN) 0.5 MG tablet 30 tablet 0     Sig: Take 1 tablet (0.5 mg) by mouth At Bedtime       There is no refill protocol information for this order        losartan (COZAAR) 100 MG tablet 30 tablet 5     Sig: Take 1 tablet (100 mg) by mouth daily       Angiotensin-II Receptors Failed - 12/17/2021  8:40 AM        Failed - Last blood pressure under 140/90 in past 12 months     BP Readings from Last 3 Encounters:   12/14/21 (!) 142/81   11/30/21 134/77   11/02/21 (!) 153/87                 Passed - Recent (12 mo) or future (30 days) visit within the authorizing provider's specialty     Patient has had an office visit with the authorizing provider or a provider within the authorizing providers department within the previous 12 mos or has a future within next 30 days. See \"Patient Info\" tab in inbasket, or \"Choose Columns\" in Meds & Orders section of the refill encounter.              Passed - Medication is active on med list        Passed - Patient is age 18 or older        Passed - No active pregnancy on record        Passed - Normal serum creatinine on file in past 12 months     Recent Labs   Lab Test 09/29/21  0831 05/16/19  0658 05/15/19  1834   CR 0.98   < >  --    CREAT  --   --  0.9    < > = values in this interval not displayed.       Ok to refill medication if creatinine is low          Passed - Normal serum potassium on file in past 12 months     Recent Labs   Lab Test 09/29/21  0831   POTASSIUM 3.9                    Passed - No positive pregnancy test in past 12 months           paliperidone ER (INVEGA) 9 MG 24 hr tablet 30 tablet 2     Sig: Take 1 tablet (9 mg) by mouth At Bedtime       There is no refill protocol information for this order        escitalopram (LEXAPRO) 10 MG tablet 30 tablet 3     Sig: Take 1 tablet (10 mg) by mouth daily       SSRIs Protocol Passed - 12/17/2021  8:40 AM        Passed - PHQ-9 score less than 5 in " "past 6 months     Please review last PHQ-9 score.           Passed - Medication is active on med list        Passed - Patient is age 18 or older        Passed - No active pregnancy on record        Passed - No positive pregnancy test in last 12 months        Passed - Recent (6 mo) or future (30 days) visit within the authorizing provider's specialty     Patient had office visit in the last 6 months or has a visit in the next 30 days with authorizing provider or within the authorizing provider's specialty.  See \"Patient Info\" tab in inbasket, or \"Choose Columns\" in Meds & Orders section of the refill encounter.               alcohol swab prep pads 100 each 3     Sig: Use to swab area of injection/rodolfo as directed.       There is no refill protocol information for this order        blood glucose (NO BRAND SPECIFIED) test strip 100 strip 11     Sig: Use to test blood sugar 10 times daily or as directed.       Diabetic Supplies Protocol Passed - 12/17/2021  8:40 AM        Passed - Medication is active on med list        Passed - Patient is 18 years of age or older        Passed - Recent (6 mo) or future (30 days) visit within the authorizing provider's specialty     Patient had office visit in the last 6 months or has a visit in the next 30 days with authorizing provider.  See \"Patient Info\" tab in inbasket, or \"Choose Columns\" in Meds & Orders section of the refill encounter.               Routing refill request to provider for review/approval because:  Drug not on the [facility] refill protocol   Hannah Glover RN  [facility]           "

## 2021-12-19 RX ORDER — PALIPERIDONE 9 MG/1
9 TABLET, EXTENDED RELEASE ORAL AT BEDTIME
Qty: 30 TABLET | Refills: 3 | Status: SHIPPED | OUTPATIENT
Start: 2021-12-19 | End: 2022-05-05

## 2021-12-19 RX ORDER — LOSARTAN POTASSIUM 100 MG/1
100 TABLET ORAL DAILY
Qty: 30 TABLET | Refills: 3 | Status: SHIPPED | OUTPATIENT
Start: 2021-12-19 | End: 2022-05-05

## 2021-12-30 NOTE — NURSING NOTE
Writer has scheduled patient for a follow up with Bennett Goltz, PA-C in March for RLS & CPAP follow up. No further actions needed.

## 2022-01-05 DIAGNOSIS — F25.1 SCHIZOAFFECTIVE DISORDER, DEPRESSIVE TYPE (H): ICD-10-CM

## 2022-01-05 DIAGNOSIS — I25.119 CORONARY ARTERY DISEASE INVOLVING NATIVE HEART WITH ANGINA PECTORIS, UNSPECIFIED VESSEL OR LESION TYPE (H): ICD-10-CM

## 2022-01-05 RX ORDER — AMANTADINE HYDROCHLORIDE 100 MG/1
CAPSULE, GELATIN COATED ORAL
Qty: 84 CAPSULE | Refills: 3 | Status: SHIPPED | OUTPATIENT
Start: 2022-01-05 | End: 2022-03-04

## 2022-01-05 NOTE — TELEPHONE ENCOUNTER
Requested Prescriptions   Pending Prescriptions Disp Refills     amantadine (SYMMETREL) 100 MG capsule [Pharmacy Med Name: Amantadine HCl 100MG CAPS] 84 capsule      Sig: TAKE 1 CAPSULE BY MOUTH EVERY MORNING AND TAKE 2 CAPSULES BY MOUTH EVERY EVENING       There is no refill protocol information for this order        Routing refill request to provider for review/approval because:  Drug not on the [facility] refill protocol     Hannah Vu RN  [facility]

## 2022-01-05 NOTE — TELEPHONE ENCOUNTER
"Requested Prescriptions   Signed Prescriptions Disp Refills    aspirin (ASA) 81 MG EC tablet 90 tablet 1     Sig: TAKE 1 TABLET (81MG) BY MOUTH DAILY       Analgesics (Non-Narcotic Tylenol and ASA Only) Passed - 1/5/2022 12:59 PM        Passed - Recent (12 mo) or future (30 days) visit within the authorizing provider's specialty     Patient has had an office visit with the authorizing provider or a provider within the authorizing providers department within the previous 12 mos or has a future within next 30 days. See \"Patient Info\" tab in inbasket, or \"Choose Columns\" in Meds & Orders section of the refill encounter.              Passed - Patient is age 20 years or older     If ASA is flagged for ages under 20 years old. Forward to provider for confirmation Ryes Syndrome is not a concern.              Passed - Medication is active on med list           Crissy Pulido RN  [facility]    "

## 2022-01-05 NOTE — TELEPHONE ENCOUNTER
Reason for Call:  Medication or medication refill:    Do you use a [facility]?  Name of the pharmacy and phone number for the current request: [id number] - [place] [address] (Pharmacy)    Name of the medication requested: aspirin (ASA) 81 MG EC tablet    Other request: NA    Can we leave a detailed message on this number? YES    Phone number patient can be reached at: Home number on file [phone] (home)    Best Time: ANY    Call taken on 1/5/2022 at 12:57 PM by Aniya De Anda

## 2022-01-06 ENCOUNTER — LAB (OUTPATIENT)
Dept: LAB | Facility: CLINIC | Age: 62
End: 2022-01-06
Payer: COMMERCIAL

## 2022-01-06 ENCOUNTER — OFFICE VISIT (OUTPATIENT)
Dept: BEHAVIORAL HEALTH | Facility: CLINIC | Age: 62
End: 2022-01-06
Payer: COMMERCIAL

## 2022-01-06 ENCOUNTER — OFFICE VISIT (OUTPATIENT)
Dept: PHARMACY | Facility: CLINIC | Age: 62
End: 2022-01-06
Payer: COMMERCIAL

## 2022-01-06 VITALS
HEART RATE: 80 BPM | WEIGHT: 227.5 LBS | HEIGHT: 60 IN | SYSTOLIC BLOOD PRESSURE: 158 MMHG | DIASTOLIC BLOOD PRESSURE: 84 MMHG | BODY MASS INDEX: 44.66 KG/M2 | TEMPERATURE: 98 F | OXYGEN SATURATION: 98 %

## 2022-01-06 DIAGNOSIS — R07.81 RIB PAIN: Primary | ICD-10-CM

## 2022-01-06 DIAGNOSIS — I10 HTN, GOAL BELOW 140/90: ICD-10-CM

## 2022-01-06 DIAGNOSIS — D50.8 OTHER IRON DEFICIENCY ANEMIA: ICD-10-CM

## 2022-01-06 DIAGNOSIS — Z79.4 TYPE 2 DIABETES MELLITUS WITH MILD NONPROLIFERATIVE RETINOPATHY WITHOUT MACULAR EDEMA, WITH LONG-TERM CURRENT USE OF INSULIN, UNSPECIFIED LATERALITY (H): ICD-10-CM

## 2022-01-06 DIAGNOSIS — G47.33 OSA (OBSTRUCTIVE SLEEP APNEA): ICD-10-CM

## 2022-01-06 DIAGNOSIS — Z53.9 ERRONEOUS ENCOUNTER--DISREGARD: Primary | ICD-10-CM

## 2022-01-06 DIAGNOSIS — E11.3299 TYPE 2 DIABETES MELLITUS WITH MILD NONPROLIFERATIVE RETINOPATHY WITHOUT MACULAR EDEMA, WITH LONG-TERM CURRENT USE OF INSULIN, UNSPECIFIED LATERALITY (H): ICD-10-CM

## 2022-01-06 LAB
BASOPHILS # BLD AUTO: 0 10E3/UL (ref 0–0.2)
BASOPHILS NFR BLD AUTO: 1 %
EOSINOPHIL # BLD AUTO: 0.1 10E3/UL (ref 0–0.7)
EOSINOPHIL NFR BLD AUTO: 3 %
ERYTHROCYTE [DISTWIDTH] IN BLOOD BY AUTOMATED COUNT: 12.8 % (ref 10–15)
HCT VFR BLD AUTO: 35.1 % (ref 35–47)
HGB BLD-MCNC: 11.2 G/DL (ref 11.7–15.7)
LYMPHOCYTES # BLD AUTO: 2.5 10E3/UL (ref 0.8–5.3)
LYMPHOCYTES NFR BLD AUTO: 47 %
MCH RBC QN AUTO: 31.4 PG (ref 26.5–33)
MCHC RBC AUTO-ENTMCNC: 31.9 G/DL (ref 31.5–36.5)
MCV RBC AUTO: 98 FL (ref 78–100)
MONOCYTES # BLD AUTO: 0.5 10E3/UL (ref 0–1.3)
MONOCYTES NFR BLD AUTO: 9 %
NEUTROPHILS # BLD AUTO: 2.1 10E3/UL (ref 1.6–8.3)
NEUTROPHILS NFR BLD AUTO: 41 %
PLATELET # BLD AUTO: 200 10E3/UL (ref 150–450)
RBC # BLD AUTO: 3.57 10E6/UL (ref 3.8–5.2)
WBC # BLD AUTO: 5.2 10E3/UL (ref 4–11)

## 2022-01-06 PROCEDURE — 82728 ASSAY OF FERRITIN: CPT

## 2022-01-06 PROCEDURE — 83550 IRON BINDING TEST: CPT

## 2022-01-06 PROCEDURE — 36415 COLL VENOUS BLD VENIPUNCTURE: CPT

## 2022-01-06 PROCEDURE — 99607 MTMS BY PHARM ADDL 15 MIN: CPT | Performed by: PHARMACIST

## 2022-01-06 PROCEDURE — 99605 MTMS BY PHARM NP 15 MIN: CPT | Performed by: PHARMACIST

## 2022-01-06 PROCEDURE — 85025 COMPLETE CBC W/AUTO DIFF WBC: CPT

## 2022-01-06 RX ORDER — METOPROLOL SUCCINATE 50 MG/1
50 TABLET, EXTENDED RELEASE ORAL 2 TIMES DAILY
Qty: 60 TABLET | Refills: 1 | Status: SHIPPED | OUTPATIENT
Start: 2022-01-06 | End: 2022-02-02

## 2022-01-06 ASSESSMENT — MIFFLIN-ST. JEOR: SCORE: 1523.43

## 2022-01-06 NOTE — PROGRESS NOTES
Patient only seen by [facility] for <5 minutes due to patient's other medical needs and time constraints. [facility] will check in with patient when she returns for primary care visit later this month.

## 2022-01-06 NOTE — PATIENT INSTRUCTIONS
Recommendations from today's MTM visit:                                                       1. Try naproxen 375mg 1 pill up to every 12 hours as needed for pain    2. Increase Lantus to 22 units twice a day    3. Increase metoprolol XL to 50mg twice a day    Follow-up: 2 weeks    It was great to speak with you today.  I value your experience and would be very thankful for your time with providing feedback on our clinic survey. You may receive a survey via email or text message in the next few days.     To schedule another MTM appointment, please call the clinic directly or you may call the MTM scheduling line at [phone] or toll-free at [phone].     My Clinical Pharmacist's contact information:                                                      Please feel free to contact me with any questions or concerns you have.      Eugenia De Jesus, PharmD, BCACP   Medication Management Pharmacist   [facility]  [phone]

## 2022-01-06 NOTE — PROGRESS NOTES
Medication Therapy Management (MTM) Encounter    ASSESSMENT:                            Medication Adherence/Access: No issues identified    Rib fracture: minimal pain relief with acetaminophen, will trial naproxen - OK for short-term use of nsaid. Encouraged non-pharmacologic management. Will also adjust medications for diabetes and hypertension with acute worsening likely 2/2 pain - see below.    Type 2 Diabetes: A1c near goal <8% but recent worsening of home glucose checks likely 2/2 pain with recent fracture. Will titrate Lantus    Hypertension: Elevated BP likely 2/2 pain, will titrate metoprolol    Sleep apnea: encouraged patient to use cpap as tolerated    PLAN:                            1. Start naproxen 375mg every 12 hours as needed for pain  2. Increase lantus to 22u twice a day  3. Increase metoprolol XL to 50mg twice a day    Follow-up: 2 weeks      SUBJECTIVE/OBJECTIVE:                          Nena Tang is a 61 year old female coming in for a follow-up visit. She was referred to me from Rowena Haas. Patient was accompanied by group [facility] staff, also spoke with RN on the phone during visit.. Today's visit is a follow-up MTM visit from 11/30/21   This is a follow-up visit but the first MTM visit of this calendar year.      Reason for visit: medication recheck.    Allergies/ADRs: Reviewed in chart  Past Medical History: Reviewed in chart  Tobacco: She reports that she has never smoked. She has never used smokeless tobacco.  Alcohol: none    Medication Adherence/Access: no issues reported. [facility] is managing her medications and administering her insulin. Receives medications from [facility].    Rib fracture: No reported injury, had low levels of rib pain until she sneezed, then acute worsening which prompted the urgent care visit on 12/29. Currently taking 1000mg up to 3 times a day which hasn't been helpful. Hurts to cough, sneeze, exercise.   Pain improved with heating pad, using 2-3  times a day.   Doesn't want to try using ice.  Mood has been down because of pain. She continues to go to the [facility] but less engagement in activities.     Type 2 Diabetes:  Currently taking Novolog 6U three times daily with meals and Lantus 19U twice daily. Patient is not experiencing side effects.  Blood sugar monitoring: Continuous Glucose Monitor.   Ranges (glucometer):       Symptoms of low blood sugar? none  Symptoms of high blood sugar? None  Eye exam: due  Foot exam: up to date  Diet/Exercise: did not discuss today. She is frustrated that sometimes she doesn't eat much and still has high glucose.  Aspirin: Taking 81mg daily   Statin: Yes: atorvastatin   ACEi/ARB: Yes: losartan  Urine Albumin:   Lab Results   Component Value Date    UMALCR 9.40 07/27/2021      Lab Results   Component Value Date    A1C 8.1 09/25/2021    A1C 10.0 08/10/2021    A1C 9.5 07/27/2021    A1C 9.1 12/01/2020    A1C 9.7 09/15/2020    A1C 8.6 06/30/2020    A1C 6.2 12/03/2019    A1C 6.6 08/06/2019     Hypertension: Current medications include losartan 100mg daily, metoprolol XL 50mg daily.  Patient has blood pressure monitored by RN. Home BP monitoring in range of 140-150's systolic over 90's diastolic.  Patient reports no current medication side effects.  BP Readings from Last 3 Encounters:   01/06/22 (!) 158/84   12/14/21 (!) 142/81   11/30/21 134/77        Sleep apnea: she was able to get her cpap mask adjusted and had been using it until the increase in rib pain. Now she has been unable to lie on her side and feels like she is suffocating with the mask on while she lies on her back.        Today's Vitals: BP (!) 158/84   Pulse 80   Temp 98  F (36.7  C)   Ht 5' (1.524 m)   Wt 227 lb 8 oz (103.2 kg)   LMP 01/06/2015 (Exact Date)   SpO2 98%   BMI 44.43 kg/m    ----------------      I spent 40 minutes with this patient today. All changes were made via collaborative practice agreement with ART Fay CNP. A  copy of the visit note was provided to the patient's primary care provider.    The patient was given a summary of these recommendations.     Eugenia De Jesus, PharmD, BCACP          Medication Therapy Recommendations  HTN, goal below 140/90    Current Medication: metoprolol succinate ER (TOPROL-XL) 25 MG 24 hr tablet (Discontinued)   Rationale: Dose too low - Dosage too low - Effectiveness   Recommendation: Increase Dose - metoprolol succinate ER 50 MG 24 hr tablet   Status: Accepted per CPA         Rib pain    Current Medication: acetaminophen (TYLENOL) 325 MG tablet   Rationale: More effective medication available - Ineffective medication - Effectiveness   Recommendation: Start Medication - naproxen 375 MG tablet   Status: Accepted per CPA         Type 2 diabetes mellitus with stage 3 chronic kidney disease, with long-term current use of insulin (H)    Current Medication: insulin glargine (LANTUS PEN) 100 UNIT/ML pen   Rationale: Dose too low - Dosage too low - Effectiveness   Recommendation: Increase Dose   Status: Accepted per CPA

## 2022-01-07 LAB
FERRITIN SERPL-MCNC: 120 NG/ML (ref 8–252)
IRON SATN MFR SERPL: 22 % (ref 15–46)
IRON SERPL-MCNC: 82 UG/DL (ref 35–180)
TIBC SERPL-MCNC: 373 UG/DL (ref 240–430)

## 2022-01-10 ENCOUNTER — MEDICAL CORRESPONDENCE (OUTPATIENT)
Dept: HEALTH INFORMATION MANAGEMENT | Facility: CLINIC | Age: 62
End: 2022-01-10
Payer: COMMERCIAL

## 2022-01-11 ENCOUNTER — TELEPHONE (OUTPATIENT)
Dept: PHARMACY | Facility: CLINIC | Age: 62
End: 2022-01-11
Payer: COMMERCIAL

## 2022-01-11 NOTE — TELEPHONE ENCOUNTER
Received VM from patient's group home RN [facility] at [phone]    Returned call,     1. Pharmacy was confused about change in instructions for metoprolol so patient has not yet increased the dose. Reviewed Rx and Amal will call [facility] pharmacy to resolve.     2. Clarified how much acetaminophen to use    3. Unfortunately she was told Dexcom would no longer be covered by  Insurance. Unsure about alternatives.    4. Needs TB quant per group home requirements    Plan:   will email [facility] pharmacy diabetes department for additional information regarding Dexcom and consider PA vs switch back to [facility] if needed per insurance.    Follow up as scheduled    Eugenia De Jesus, KiD, BCACP

## 2022-01-11 NOTE — TELEPHONE ENCOUNTER
Received communication back from [facility] pharmacy - they are no longer luis with [facility] to be able to refill Dexcom supplies but recommend switching Rx to a [facility] or [facility].      Called and left information for [facility] JNI on her voicemail.     Eugenia De Jesus, PharmD, BCACP

## 2022-01-19 PROBLEM — M25.551 HIP PAIN, RIGHT: Status: RESOLVED | Noted: 2021-11-09 | Resolved: 2022-01-19

## 2022-01-26 ENCOUNTER — OFFICE VISIT (OUTPATIENT)
Dept: PSYCHIATRY | Facility: CLINIC | Age: 62
End: 2022-01-26
Payer: COMMERCIAL

## 2022-01-26 DIAGNOSIS — M54.50 CHRONIC RIGHT-SIDED LOW BACK PAIN WITHOUT SCIATICA: ICD-10-CM

## 2022-01-26 DIAGNOSIS — G89.4 CHRONIC PAIN SYNDROME: ICD-10-CM

## 2022-01-26 DIAGNOSIS — F25.1 SCHIZOAFFECTIVE DISORDER, DEPRESSIVE TYPE (H): ICD-10-CM

## 2022-01-26 DIAGNOSIS — M79.7 FIBROMYALGIA: ICD-10-CM

## 2022-01-26 DIAGNOSIS — F51.04 PSYCHOPHYSIOLOGICAL INSOMNIA: ICD-10-CM

## 2022-01-26 DIAGNOSIS — G89.29 CHRONIC RIGHT-SIDED LOW BACK PAIN WITHOUT SCIATICA: ICD-10-CM

## 2022-01-26 PROCEDURE — 99212 OFFICE O/P EST SF 10 MIN: CPT | Performed by: PSYCHIATRY & NEUROLOGY

## 2022-01-26 RX ORDER — GABAPENTIN 300 MG/1
300 CAPSULE ORAL AT BEDTIME
Qty: 30 CAPSULE | Refills: 3 | Status: SHIPPED | OUTPATIENT
Start: 2022-01-26 | End: 2022-05-09

## 2022-01-26 RX ORDER — HYDROXYZINE PAMOATE 25 MG/1
25 CAPSULE ORAL EVERY 4 HOURS PRN
Qty: 60 CAPSULE | Refills: 3 | Status: SHIPPED | OUTPATIENT
Start: 2022-01-26 | End: 2022-08-30

## 2022-01-26 RX ORDER — TOPIRAMATE 200 MG/1
200 TABLET, FILM COATED ORAL DAILY
Qty: 30 TABLET | Refills: 3 | Status: SHIPPED | OUTPATIENT
Start: 2022-01-26 | End: 2022-05-09

## 2022-01-26 NOTE — PROGRESS NOTES
Visit Date: 01/26/2022    PSYCHIATRY MEDICATION MANAGEMENT CONSULTATION    IDENTIFICATION:  Ms. Nena Tang is a 61-year-old female who carries a diagnosis of schizophrenia.  Her medications include paliperidone 9 mg at bedtime, trazodone 100 mg at bedtime, mirtazapine 15 mg at bedtime, hydroxyzine at bedtime and p.r.n., and Lexapro 10 mg in the morning.  She also gets 0.5 mg of Klonopin and she gets amantadine for extrapyramidal side effects as well as Topamax.    At our last visit, group [place] contacted me and asked me to give her something more for sleep.  I added mirtazapine.  Currently, the patient tells me that she is doing well.  She says she likes her group home.  She is visiting with her sisters.  She is getting along with her sons.  She does not feel that she is isolated.  In general, psychiatrically she is doing quite well.  She did fracture a rib, so there is still ongoing pain, but that was over a month ago.  She says her group home is working on finding her a new psychiatrist and she follows up here with Rowena Haas.  Today, the patient perceives that she is doing about as well as she can.  She seems to be quite content with her current treatment and her current living situation and happily she says that her oldest son is very proud of her for how well she is doing so, that is great that she is getting along with her family.    MENTAL STATUS EXAMINATION:  Today, the patient was pleasant and cooperative.  Her mood was good.  Her affect is blunted.  Her speech is coherent and goal oriented.  Her associations tight and her thought processes were generally logical and linear.  Her content of thought was currently without psychosis or suicidal ideation.  Recent and remote memory, concentration, fund of knowledge and use of language were all at baseline.  She is alert and oriented x3.  Her insight and judgment are relatively intact.  Her gait and station are abnormal.  Her gait is quite slow and now  with a broken rib, it is slower than usual.  She also has ongoing hip pain.  Her most recent blood pressure was 158/84.    ASSESSMENT:  Chronic schizophrenia, though she also has carried the diagnosis of schizoaffective disorder.    RECOMMENDATIONS:  Continue current medications.  Today, we refilled the medicines that were running low.  She knows she needs to find another psychiatrist, and she can certainly continue in this clinic with Rowena Haas.    Reginald Brothers MD        D: 2022   T: 2022   MT: [id number]    Name:     ERIK BURNHAM  MRN:      [id number]        Account:    [id number]   :      1960           Visit Date: 2022     Document: [id number]

## 2022-01-28 ENCOUNTER — OFFICE VISIT (OUTPATIENT)
Dept: FAMILY MEDICINE | Facility: CLINIC | Age: 62
End: 2022-01-28
Payer: COMMERCIAL

## 2022-01-28 VITALS
HEART RATE: 71 BPM | DIASTOLIC BLOOD PRESSURE: 74 MMHG | TEMPERATURE: 96.4 F | OXYGEN SATURATION: 98 % | BODY MASS INDEX: 44.76 KG/M2 | WEIGHT: 228 LBS | SYSTOLIC BLOOD PRESSURE: 172 MMHG | HEIGHT: 60 IN

## 2022-01-28 DIAGNOSIS — E11.22 TYPE 2 DIABETES MELLITUS WITH STAGE 3 CHRONIC KIDNEY DISEASE, WITH LONG-TERM CURRENT USE OF INSULIN, UNSPECIFIED WHETHER STAGE 3A OR 3B CKD (H): ICD-10-CM

## 2022-01-28 DIAGNOSIS — I10 HTN, GOAL BELOW 140/90: ICD-10-CM

## 2022-01-28 DIAGNOSIS — N18.30 TYPE 2 DIABETES MELLITUS WITH STAGE 3 CHRONIC KIDNEY DISEASE, WITH LONG-TERM CURRENT USE OF INSULIN, UNSPECIFIED WHETHER STAGE 3A OR 3B CKD (H): ICD-10-CM

## 2022-01-28 DIAGNOSIS — Z79.4 TYPE 2 DIABETES MELLITUS WITH STAGE 3 CHRONIC KIDNEY DISEASE, WITH LONG-TERM CURRENT USE OF INSULIN, UNSPECIFIED WHETHER STAGE 3A OR 3B CKD (H): ICD-10-CM

## 2022-01-28 DIAGNOSIS — R07.81 RIB PAIN ON RIGHT SIDE: ICD-10-CM

## 2022-01-28 DIAGNOSIS — Z00.00 ENCOUNTER FOR PREVENTIVE CARE: Primary | ICD-10-CM

## 2022-01-28 DIAGNOSIS — F25.1 SCHIZOAFFECTIVE DISORDER, DEPRESSIVE TYPE (H): ICD-10-CM

## 2022-01-28 PROCEDURE — 36415 COLL VENOUS BLD VENIPUNCTURE: CPT | Performed by: NURSE PRACTITIONER

## 2022-01-28 PROCEDURE — 99214 OFFICE O/P EST MOD 30 MIN: CPT | Performed by: NURSE PRACTITIONER

## 2022-01-28 PROCEDURE — 86481 TB AG RESPONSE T-CELL SUSP: CPT | Performed by: NURSE PRACTITIONER

## 2022-01-28 RX ORDER — MIRTAZAPINE 15 MG/1
15 TABLET, FILM COATED ORAL AT BEDTIME
COMMUNITY
End: 2022-02-23

## 2022-01-28 ASSESSMENT — MIFFLIN-ST. JEOR: SCORE: 1525.7

## 2022-01-28 NOTE — PATIENT INSTRUCTIONS
- Use naproxen 375 mg twice per day x 5 days.   - Use heat and cold to the area.  - Follow-up with Eugenia next week for BP and pain recheck.

## 2022-01-28 NOTE — PROGRESS NOTES
Assessment & Plan     Encounter for preventive care  Routine monitoring for group home residents.   - Quantiferon TB Gold Plus; Future    HTN, goal below 140/90  Inadequately controlled. Acute pain vs uncontrolled BP's. Follow-up in clinic for BP recheck with MTDELROY and medication adjustment.     Rib pain on right side  Needs improvement. Scheduled to follow-up with Eugenia on 2/2. Will discuss medication change for pain control vs BP monitoring if still elevated. Continue with heat and ice, encouraged using the naproxen more consistently over the next 5 days.   Discussed with patient and staff about concern for over sedation with her current medication and adding an opiate to help with her pain control.   - diclofenac (VOLTAREN) 1 % topical gel; Apply 4 g topically 4 times daily    Schizoaffective disorder, depressive type (H)  Needs improvement. Patent reports having increased nightmares. Due to time constraint we will need to follow-up with this at a later appointment.   - Needs psychiatry follow-up.   - Touch base with Eugenia (MTDELROY) before her appt about this for follow-up.     Type 2 diabetes mellitus with stage 3 chronic kidney disease, with long-term current use of insulin, unspecified whether stage 3a or 3b CKD (H)  Needs improvement. Still struggling with blood sugar control. Will follow-up with Eugenia next week.       Ordering of each unique test  Prescription drug management  I spent a total of 21 minutes on the day of the visit.   Time spent doing chart review, history and exam, documentation and further activities per the note       Patient Instructions   - Use naproxen 375 mg twice per day x 5 days.   - Use heat and cold to the area.  - Follow-up with Eugenia next week for BP and pain recheck.         Return in about 4 weeks (around 2/25/2022) for Routine Visit.    ART Fay [facility]    Provided education to patient on doing deep breathing exercises several  times per day and staying active to prevent pneumonia.    Schizoafffective disorder: patient seen by psychiatry on 01/26. Recommended to continue current medications.    Washington Lewis, student APRGARRETT Barrett is a 61 year old who presents for the following health issues  accompanied by her Heena, manager at the facility.    HPI     Rib Fracture: Patient had cracked a right rib about one month ago and has been having persistent back pain. Worse with breathing, coughing, sneezing.  Has to sleep on back (has been difficult to sleep). Constant. Sharp. 6/10. Patient has tried heat with some relief. Patient has also been trying tylenol and a muscle relaxant- not sure if she has been doing the naproxen. Unable to attend PT for 6-8 weeks due to the rib pain.     Group Home: Patient needs a TB blood test- discussed and ordered.    Hypertension: Elevated BP related to pain-BP been running 140s-150s. Patient will follow-up next week for BP management with MTM. Need to discuss medication dose changes. Patient denies any new symptoms. No headaches, vision changes, chest pain, shortness of breath or heart palpitations.   Patient reports that she has been complaint with her medicines everyday.      Diabetes:Patient also reports blood sugars have been running high, about 300s (normally in the 200s). Denies any new symptoms.        Review of Systems   Constitutional, HEENT, cardiovascular, pulmonary, gi and gu systems are negative, except as otherwise noted.      Objective    BP (!) 172/74 (BP Location: Left arm, Patient Position: Sitting, Cuff Size: Adult Regular)   Pulse 71   Temp (!) 96.4  F (35.8  C) (Temporal)   Ht 1.524 m (5')   Wt 103.4 kg (228 lb)   LMP 01/06/2015 (Exact Date)   SpO2 98%   BMI 44.53 kg/m    Body mass index is 44.53 kg/m .  Physical Exam   GENERAL: healthy, alert and no distress  EYES: Eyes grossly normal to inspection, PERRL and conjunctivae and sclerae normal  HENT: ear canals and  TM's normal, nose and mouth without ulcers or lesions  NECK: no adenopathy, no asymmetry, masses, or scars and thyroid normal to palpation  RESP: lungs clear to auscultation - no rales, rhonchi or wheezes  CV: regular rate and rhythm, normal S1 S2, no S3 or S4, no murmur, click or rub, no peripheral edema and peripheral pulses strong  ABDOMEN: soft, nontender, no hepatosplenomegaly, no masses and bowel sounds normal  MS: no gross musculoskeletal defects noted. Mild bilateral edema  SKIN: no suspicious lesions or rashes  NEURO: Normal strength and tone, mentation intact and speech normal  PSYCH: Flat affect. Slow speech.    Labs Reviewed.

## 2022-01-28 NOTE — Clinical Note
FYI- My last patient on Friday and I hardly did anything for her:(  as I work on the note.  Lets chat about her on Tuesday.   Thanks! Rowena.

## 2022-01-31 ENCOUNTER — TELEPHONE (OUTPATIENT)
Dept: FAMILY MEDICINE | Facility: CLINIC | Age: 62
End: 2022-01-31
Payer: COMMERCIAL

## 2022-01-31 LAB
GAMMA INTERFERON BACKGROUND BLD IA-ACNC: 0.02 IU/ML
M TB IFN-G BLD-IMP: NEGATIVE
M TB IFN-G CD4+ BCKGRND COR BLD-ACNC: 9.98 IU/ML
MITOGEN IGNF BCKGRD COR BLD-ACNC: -0.01 IU/ML
MITOGEN IGNF BCKGRD COR BLD-ACNC: 0 IU/ML
QUANTIFERON MITOGEN: 10 IU/ML
QUANTIFERON NIL TUBE: 0.02 IU/ML
QUANTIFERON TB1 TUBE: 0.01 IU/ML
QUANTIFERON TB2 TUBE: 0.02

## 2022-01-31 NOTE — TELEPHONE ENCOUNTER
Reason for Call:  Form, our goal is to have forms completed with 72 hours, however, some forms may require a visit or additional information.    Type of letter, form or note:  handicap; Application for disability Parking certificate    Who is the form from?: Patient    Where did the form come from: Patient or family brought in       What clinic location was the form placed at?: [facility]    Where the form was placed: [facility]    What number is listed as a contact on the form?:   P;[phone]  Pt's number- [phone]       Additional comments: Please review, sign, and call patient to notify forms are ready.     Call taken on 1/31/2022 at 8:32 AM by Chelsea Chino

## 2022-02-01 ENCOUNTER — MEDICAL CORRESPONDENCE (OUTPATIENT)
Dept: HEALTH INFORMATION MANAGEMENT | Facility: CLINIC | Age: 62
End: 2022-02-01

## 2022-02-01 DIAGNOSIS — Z53.9 DIAGNOSIS NOT YET DEFINED: Primary | ICD-10-CM

## 2022-02-01 PROCEDURE — G0179 MD RECERTIFICATION HHA PT: HCPCS | Performed by: FAMILY MEDICINE

## 2022-02-02 ENCOUNTER — OFFICE VISIT (OUTPATIENT)
Dept: PHARMACY | Facility: CLINIC | Age: 62
End: 2022-02-02
Payer: COMMERCIAL

## 2022-02-02 VITALS
OXYGEN SATURATION: 96 % | SYSTOLIC BLOOD PRESSURE: 144 MMHG | TEMPERATURE: 97 F | BODY MASS INDEX: 44.37 KG/M2 | HEIGHT: 60 IN | WEIGHT: 226 LBS | DIASTOLIC BLOOD PRESSURE: 72 MMHG | HEART RATE: 77 BPM

## 2022-02-02 DIAGNOSIS — E11.3299 TYPE 2 DIABETES MELLITUS WITH MILD NONPROLIFERATIVE RETINOPATHY WITHOUT MACULAR EDEMA, WITH LONG-TERM CURRENT USE OF INSULIN, UNSPECIFIED LATERALITY (H): ICD-10-CM

## 2022-02-02 DIAGNOSIS — R07.81 RIB PAIN: ICD-10-CM

## 2022-02-02 DIAGNOSIS — I10 HTN, GOAL BELOW 140/90: Primary | ICD-10-CM

## 2022-02-02 DIAGNOSIS — Z79.4 TYPE 2 DIABETES MELLITUS WITH MILD NONPROLIFERATIVE RETINOPATHY WITHOUT MACULAR EDEMA, WITH LONG-TERM CURRENT USE OF INSULIN, UNSPECIFIED LATERALITY (H): ICD-10-CM

## 2022-02-02 PROCEDURE — 99607 MTMS BY PHARM ADDL 15 MIN: CPT | Performed by: PHARMACIST

## 2022-02-02 PROCEDURE — 99605 MTMS BY PHARM NP 15 MIN: CPT | Performed by: PHARMACIST

## 2022-02-02 RX ORDER — METOPROLOL SUCCINATE 100 MG/1
100 TABLET, EXTENDED RELEASE ORAL 2 TIMES DAILY
Qty: 60 TABLET | Refills: 1 | Status: SHIPPED | OUTPATIENT
Start: 2022-02-02 | End: 2022-02-23

## 2022-02-02 ASSESSMENT — MIFFLIN-ST. JEOR: SCORE: 1511.63

## 2022-02-02 NOTE — LETTER
"Recommended To-Do List      Prepared on: 2/8/2022     You can get the best results from your medications by completing the items on this \"To-Do List.\"      Bring your To-Do List when you go to your doctor. And, share it with your family or caregivers.    My To-Do List:      What we talked about: What I should do:   Your medication dosage being too low    Increase your dosage of metoprolol succinate ER (TOPROL-XL) to 100mg twice a day          What we talked about: What I should do:   Your medication dosage being too low    Increase your dosage of insulin glargine (LANTUS PEN) to 26 units twice a day          What we talked about: What I should do:                     "

## 2022-02-02 NOTE — PROGRESS NOTES
Medication Therapy Management (MTM) Encounter    ASSESSMENT:                            Medication Adherence/Access: No issues identified    Rib fracture: Encouraged daytime use of naproxen in the short-term to help with pain control.    Type 2 Diabetes: A1c not at goal <7%. Fasting glucose not at goal , will continue to titrate Lantus - glucose has been elevated since rib fracture and increase in pain.    Hypertension: BP continues to be over goal 140/90; will continue to titrate metoprolol. Plan to reduce doses back to baseline once pain is controlled.     PLAN:                            1. Increase Lantus to 26 units twice a day  2. Increase metoprolol to 100mg twice a day    Follow-up: 1-2 weeks for blood pressure and glucose recheck.    SUBJECTIVE/OBJECTIVE:                          Nena Tang is a 62 year old female coming in for a follow-up visit.  Today's visit is a follow-up MTM visit from 1/6/22. Accompanied by group home staff.   Patient was 15 minutes late for her appointment leaving little available time today for assessment.    Reason for visit: diabetes and blood pressure recheck.    Allergies/ADRs: Reviewed in chart  Past Medical History: Reviewed in chart  Tobacco: She reports that she has never smoked. She has never used smokeless tobacco.  Alcohol: none      Medication Adherence/Access: no issues reported. custodial is managing her medications and administering her insulin. Receives medications from [facility].    Rib fracture: has been using naproxen more often, taking every night and sleeping a little better. She hasn't been using it much during the day. Continues to use Tylenol as needed. Continues to have bothersome pain.    Type 2 Diabetes:  Currently taking Novolog 6U three times daily with meals and Lantus 22U twice daily, Trulicity 1.5mg once weekly. Patient is not experiencing side effects.  Blood sugar monitoring: Continuous Glucose Monitor.   Ranges (glucometer):          Symptoms of low blood sugar? none  Symptoms of high blood sugar? None  Eye exam: due  Foot exam: up to date  Diet/Exercise: did not discuss today. She is frustrated that sometimes she doesn't eat much and still has high glucose.  Aspirin: Taking 81mg daily   Statin: Yes: atorvastatin   ACEi/ARB: Yes: losartan  Urine Albumin:   Lab Results   Component Value Date    UMALCR 9.40 07/27/2021      Lab Results   Component Value Date    A1C 8.1 09/25/2021    A1C 10.0 08/10/2021    A1C 9.5 07/27/2021    A1C 9.1 12/01/2020    A1C 9.7 09/15/2020    A1C 8.6 06/30/2020    A1C 6.2 12/03/2019    A1C 6.6 08/06/2019       Hypertension: Current medications include losartan 100mg daily, metoprolol XL 50mg twice a day.  Patient has blood pressure monitored by RN. Home BP monitoring in range of 140-150's systolic.  Patient reports no current medication side effects.  BP Readings from Last 3 Encounters:   01/28/22 (!) 172/74   01/06/22 (!) 158/84   12/14/21 (!) 142/81       Today's Vitals: BP (!) 144/72   Pulse 77   Temp 97  F (36.1  C) (Temporal)   Ht 5' (1.524 m)   Wt 226 lb (102.5 kg)   LMP 01/06/2015 (Exact Date)   SpO2 96%   BMI 44.14 kg/m    ----------------      I spent 20 minutes with this patient today. All changes were made via collaborative practice agreement with ART Fay CNP. A copy of the visit note was provided to the patient's provider(s).    The patient was given a summary of these recommendations.     Eugenia De Jesus, PharmD, BCACP        Medication Therapy Recommendations  HTN, goal below 140/90    Current Medication: metoprolol succinate ER (TOPROL-XL) 25 MG 24 hr tablet (Discontinued)   Rationale: Dose too low - Dosage too low - Effectiveness   Recommendation: Increase Dose - metoprolol succinate ER 50 MG 24 hr tablet   Status: Accepted per CPA          Current Medication: metoprolol succinate ER (TOPROL-XL) 50 MG 24 hr tablet (Discontinued)   Rationale: Dose too low - Dosage too low -  Effectiveness   Recommendation: Increase Dose - metoprolol succinate  MG 24 hr tablet   Status: Accepted per CPA         Type 2 diabetes mellitus without complication, with long-term current use of insulin (H)    Current Medication: insulin glargine (LANTUS PEN) 100 UNIT/ML pen   Rationale: Dose too low - Dosage too low - Effectiveness   Recommendation: Increase Dose   Status: Accepted per CPA

## 2022-02-02 NOTE — LETTER
February 8, 2022  Nena Tang  [address] [place] [address]    Dear Ms. Tang, [facility]        Thank you for talking with me on Feb 2, 2022 about your health and medications. As a follow-up to our conversation, I have included two documents:      1. Your Recommended To-Do List has steps you should take to get the best results from your medications.  2. Your Medication List will help you keep track of your medications and how to take them.    If you want to talk about these documents, please call Eugenia De Jesus RPH at phone: [phone], Monday-Friday 8-4:30pm.    I look forward to working with you and your doctors to make sure your medications work well for you.    Sincerely,    Eugenia De Jesus RPH

## 2022-02-02 NOTE — LETTER
_  Medication List        Prepared on: 2/8/2022     Bring your Medication List when you go to the doctor, hospital, or   emergency room. And, share it with your family or caregivers.     Note any changes to how you take your medications.  Cross out medications when you no longer use them.    Medication How I take it Why I use it Prescriber   acetaminophen (TYLENOL) 325 MG tablet Take 650mg at bedtime scheduled and additional 650mg every 6 hours as needed, max 3000mg/day pain ART Arias CNP   albuterol (PROAIR HFA/PROVENTIL HFA/VENTOLIN HFA) 108 (90 Base) MCG/ACT inhaler Inhale 2 puffs into the lungs 4 times daily as needed for shortness of breath / dyspnea 2019 Novel Coronavirus Disease (Covid-19) Washington Spears MD   alcohol swab prep pads Use to swab area of injection/rodolfo as directed. Type 2 diabetes mellitus with mild nonproliferative retinopathy without macular edema, with long-term current use of insulin, unspecified laterality (H) Albino Rainey MD   amantadine (SYMMETREL) 100 MG capsule TAKE 1 CAPSULE BY MOUTH EVERY MORNING AND TAKE 2 CAPSULES BY MOUTH EVERY EVENING Schizoaffective Disorder, Depressive Type (H) Albino Rainey MD   aspirin (ASA) 81 MG EC tablet TAKE 1 TABLET (81MG) BY MOUTH DAILY Coronary artery disease involving native heart with angina pectoris, unspecified vessel or lesion type (H) ART Arias CNP   atorvastatin (LIPITOR) 80 MG tablet TAKE 1 TABLET (80MG) BY MOUTH DAILY Hyperlipidemia LDL Goal <100 ART Arias CNP   benzonatate (TESSALON) 100 MG capsule Take 1 capsule (100 mg) by mouth 3 times daily as needed for cough Cough ART Arias CNP   blood glucose (NO BRAND SPECIFIED) test strip Use to test blood sugar 10 times daily or as directed. Type 2 diabetes mellitus with stage 3 chronic kidney disease, with long-term current use of insulin, unspecified whether stage 3a or 3b CKD (H) Albino Rainey MD    clonazePAM (KLONOPIN) 0.5 MG tablet Take 1 tablet (0.5 mg) by mouth At Bedtime Schizoaffective Disorder, Bipolar Type (H) Albino Rainey MD   Continuous Blood Gluc Sensor (DEXCOM G6 SENSOR) MISC Change every 10 days. Type 2 diabetes mellitus with mild nonproliferative retinopathy without macular edema, with long-term current use of insulin, unspecified laterality (H) ART Arias CNP   Continuous Blood Gluc Transmit (DEXCOM G6 TRANSMITTER) MISC Change every 3 months. Type 2 diabetes mellitus with mild nonproliferative retinopathy without macular edema, with long-term current use of insulin, unspecified laterality (H) ART Arias CNP   diclofenac (VOLTAREN) 1 % topical gel Apply 4 g topically 4 times daily Rib pain on right side ART Arias CNP   escitalopram (LEXAPRO) 10 MG tablet Take 1 tablet (10 mg) by mouth daily Other schizophrenia (H) Albino Rainey MD   famotidine (PEPCID) 20 MG tablet Take 1 tablet (20 mg) by mouth daily Gastroesophageal Reflux Disease without Esophagitis ART Arias CNP   gabapentin (NEURONTIN) 300 MG capsule Take 1 capsule (300 mg) by mouth At Bedtime Chronic right-sided low back pain without sciatica; Schizoaffective Disorder, Depressive Type (H) Reginald Brothers MD   hydrOXYzine (VISTARIL) 25 MG capsule Take 1 capsule (25 mg) by mouth every 4 hours as needed for anxiety May take nightly for sleep Psychophysiological Insomnia Reginald Brothers MD   insulin aspart (NOVOLOG FLEXPEN) 100 UNIT/ML pen Inject 6 Units Subcutaneous 3 times daily (with meals) Hold for glucose less than 70. Type 2 diabetes mellitus with mild nonproliferative retinopathy without macular edema, with long-term current use of insulin, unspecified laterality (H) ART Arias CNP   insulin glargine (LANTUS PEN) 100 UNIT/ML pen Inject 26 Units Subcutaneous 2 times daily Type 2 diabetes mellitus with mild nonproliferative retinopathy without  macular edema, with long-term current use of insulin, unspecified laterality (H) ART Arias CNP   insulin pen needle (NOVOFINE 30) 30G X 8 MM miscellaneous USE 4 DAILY OR AS DIRECTED Type 2 diabetes mellitus with mild nonproliferative retinopathy without macular edema, with long-term current use of insulin, unspecified laterality (H) ART Arias CNP   losartan (COZAAR) 100 MG tablet Take 1 tablet (100 mg) by mouth daily Coronary artery disease involving native heart with angina pectoris, unspecified vessel or lesion type (H) Albino Rainey MD   metoprolol succinate ER (TOPROL-XL) 100 MG 24 hr tablet Take 1 tablet (100 mg) by mouth 2 times daily HTN, goal below 140/90 ART Arias CNP   mirtazapine (REMERON) 15 MG tablet Take 15 mg by mouth At Bedtime insomnia Reginald Brothers MD   naproxen (NAPROSYN) 375 MG tablet Take 1 tablet (375 mg) by mouth 2 times daily as needed for moderate pain Rib Pain ART Arias CNP   nystatin (MYCOSTATIN) 537622 UNIT/GM external powder Apply topically 2 times daily as needed Rash and Nonspecific Skin Eruption ART Arias CNP   paliperidone ER (INVEGA) 9 MG 24 hr tablet Take 1 tablet (9 mg) by mouth At Bedtime Schizoaffective Disorder, Depressive Type (H) Albino Rainey MD   pantoprazole (PROTONIX) 40 MG EC tablet Take 1 tablet (40 mg) by mouth daily Gastroesophageal Reflux Disease without Esophagitis ART Arias CNP   senna-docusate (SENOKOT-S/PERICOLACE) 8.6-50 MG tablet Take 1 tablet daily and take an extra tablet with constipation. Constipation, unspecified constipation type ART Arias CNP   thin (NO BRAND SPECIFIED) lancets Use with lanceting device. To accompany: Blood Glucose Monitor Brands: per insurance. Type 2 diabetes mellitus with mild nonproliferative retinopathy without macular edema, with long-term current use of insulin, unspecified laterality (H) ART Arias CNP    topiramate (TOPAMAX) 200 MG tablet Take 1 tablet (200 mg) by mouth daily Fibromyalgia; Chronic Pain Syndrome Reginald Brothers MD   traZODone (DESYREL) 100 MG tablet Take 1 tablet (100 mg) by mouth At Bedtime Schizoaffective Disorder, Depressive Type (H) ART Arias CNP   TRULICITY 1.5 MG/0.5ML pen Inject 1.5 mg Subcutaneous once a week Every Friday Type 2 diabetes mellitus with hyperglycemia, with long-term current use of insulin (H) ART Arias CNP   zinc oxide (DESITIN) 20 % external ointment Apply topically 3 times daily as needed for irritation (barrier cream) Skin Irritation ART Arias CNP         Add new medications, over-the-counter drugs, herbals, vitamins, or  minerals in the blank rows below.    Medication How I take it Why I use it Prescriber                          Allergies:      imidazole antifungals; ketoprofen; lisinopril; metformin; metronidazole; posaconazole        Side effects I have had:              Other Information:             My notes and questions:

## 2022-02-02 NOTE — PATIENT INSTRUCTIONS
Recommendations from today's MTM visit:                                                       1. Increase Lantus to 26 units twice a day    2. Increase metoprolol to 100mg twice a day    Follow-up: 4 weeks with Alex    It was great to speak with you today.  I value your experience and would be very thankful for your time with providing feedback on our clinic survey. You may receive a survey via email or text message in the next few days.     To schedule another MTM appointment, please call the clinic directly or you may call the MTM scheduling line at [phone] or toll-free at [phone].     My Clinical Pharmacist's contact information:                                                      Please feel free to contact me with any questions or concerns you have.      Eugenia De Jesus, PharmD, BCACP   Medication Management Pharmacist   [facility]  [phone]

## 2022-02-23 ENCOUNTER — OFFICE VISIT (OUTPATIENT)
Dept: PHARMACY | Facility: CLINIC | Age: 62
End: 2022-02-23
Payer: COMMERCIAL

## 2022-02-23 ENCOUNTER — OFFICE VISIT (OUTPATIENT)
Dept: PSYCHIATRY | Facility: CLINIC | Age: 62
End: 2022-02-23
Payer: COMMERCIAL

## 2022-02-23 ENCOUNTER — IMMUNIZATION (OUTPATIENT)
Dept: FAMILY MEDICINE | Facility: CLINIC | Age: 62
End: 2022-02-23
Payer: COMMERCIAL

## 2022-02-23 VITALS
HEIGHT: 60 IN | TEMPERATURE: 97.6 F | SYSTOLIC BLOOD PRESSURE: 126 MMHG | HEART RATE: 74 BPM | BODY MASS INDEX: 47.22 KG/M2 | WEIGHT: 240.5 LBS | OXYGEN SATURATION: 98 % | DIASTOLIC BLOOD PRESSURE: 80 MMHG

## 2022-02-23 DIAGNOSIS — R07.81 RIB PAIN ON RIGHT SIDE: ICD-10-CM

## 2022-02-23 DIAGNOSIS — Z79.4 TYPE 2 DIABETES MELLITUS WITH MILD NONPROLIFERATIVE RETINOPATHY WITHOUT MACULAR EDEMA, WITH LONG-TERM CURRENT USE OF INSULIN, UNSPECIFIED LATERALITY (H): ICD-10-CM

## 2022-02-23 DIAGNOSIS — E11.3299 TYPE 2 DIABETES MELLITUS WITH MILD NONPROLIFERATIVE RETINOPATHY WITHOUT MACULAR EDEMA, WITH LONG-TERM CURRENT USE OF INSULIN, UNSPECIFIED LATERALITY (H): ICD-10-CM

## 2022-02-23 DIAGNOSIS — Z23 HIGH PRIORITY FOR 2019-NCOV VACCINE: Primary | ICD-10-CM

## 2022-02-23 DIAGNOSIS — I10 HTN, GOAL BELOW 140/90: Primary | ICD-10-CM

## 2022-02-23 DIAGNOSIS — Z71.85 VACCINE COUNSELING: ICD-10-CM

## 2022-02-23 DIAGNOSIS — F25.1 SCHIZOAFFECTIVE DISORDER, DEPRESSIVE TYPE (H): Primary | ICD-10-CM

## 2022-02-23 PROCEDURE — 99214 OFFICE O/P EST MOD 30 MIN: CPT | Performed by: PSYCHIATRY & NEUROLOGY

## 2022-02-23 PROCEDURE — 99207 PR CDG-MDM COMPONENT: MEETS MODERATE - UP CODED: CPT | Performed by: PSYCHIATRY & NEUROLOGY

## 2022-02-23 PROCEDURE — 99607 MTMS BY PHARM ADDL 15 MIN: CPT | Performed by: PHARMACIST

## 2022-02-23 PROCEDURE — 91306 COVID-19,PF,MODERNA (18+ YRS BOOSTER .25ML): CPT

## 2022-02-23 PROCEDURE — 0064A COVID-19,PF,MODERNA (18+ YRS BOOSTER .25ML): CPT

## 2022-02-23 PROCEDURE — 99606 MTMS BY PHARM EST 15 MIN: CPT | Performed by: PHARMACIST

## 2022-02-23 RX ORDER — METOPROLOL SUCCINATE 50 MG/1
TABLET, EXTENDED RELEASE ORAL
Qty: 90 TABLET | Refills: 3 | Status: SHIPPED | OUTPATIENT
Start: 2022-02-23 | End: 2022-02-24

## 2022-02-23 RX ORDER — QUETIAPINE FUMARATE 25 MG/1
25 TABLET, FILM COATED ORAL AT BEDTIME
Qty: 30 TABLET | Refills: 3 | Status: SHIPPED | OUTPATIENT
Start: 2022-02-23 | End: 2022-05-09

## 2022-02-23 RX ORDER — QUETIAPINE FUMARATE 25 MG/1
25 TABLET, FILM COATED ORAL 2 TIMES DAILY
Qty: 30 TABLET | Refills: 3 | Status: SHIPPED | OUTPATIENT
Start: 2022-02-23 | End: 2022-02-23

## 2022-02-23 RX ORDER — INSULIN ASPART 100 [IU]/ML
6 INJECTION, SOLUTION INTRAVENOUS; SUBCUTANEOUS
Qty: 30 ML | Refills: 1 | Status: SHIPPED | OUTPATIENT
Start: 2022-02-23 | End: 2022-05-09

## 2022-02-23 NOTE — PROGRESS NOTES
Visit Date: 02/23/2022    MEDICATION MANAGEMENT    IDENTIFICATION:  Ms. Tang is a 62-year-old -American female who is treated for schizoaffective disorder.  Her current medications include quetiapine 25 mg at bedtime; I note that the chart says b.i.d., but it is really only ordered for bedtime.  She is also on amantadine that she has been on for years as well as 10 mg of Lexapro, gabapentin 300 at bedtime and paliperidone 9 mg a day.  She is also on Topamax and trazodone.  Today, the patient reports that she is having difficulty sleeping.  She has some voices at night, but mostly she has nightmares.  We discussed various options and decided on Seroquel 25 at bedtime.  We thought about using prazosin, but because her antihypertensives are being adjusted by Eugenia, I thought it would be preferable to give 25 of Seroquel a try.    The patient was here with her  and the  thought the Seroquel might well be a good idea.  Otherwise, the patient continues to do well.  She is followed by Rowena and will be referred to a new psychiatrist.    MENTAL STATUS EXAMINATION:  Today, the patient was pleasant and cooperative.  Her mood was neutral.  Her affect is usually quite restricted.  Her speech is coherent and goal oriented.  Her associations are generally tight and her thought processes logical and linear.  Her content of thought does include auditory hallucinations.  She did not report suicidal ideation.  Recent and remote memory, concentration, fund of knowledge and use of language are at baseline.  She is alert and oriented x 3.  Insight and judgment are at baseline.  Muscle strength and tone are at baseline.  Gait and station is somewhat slow and broad-based.    ASSESSMENT:  Schizoaffective disorder.    RECOMMENDATION:  Add Seroquel 25 at bedtime.    Reginald Brothers MD        D: 02/23/2022   T: 02/23/2022   MT: [id number]    Name:     ERIK TANG  MRN:      [id number]         Account:    [id number]   :      1960           Visit Date: 2022     Document: [id number]

## 2022-02-23 NOTE — PATIENT INSTRUCTIONS
Recommendations from today's MTM visit:                                                       1. Decrease metoprolol XL to 50mg in the morning and 100mg at night    2. Use Voltaren (diclofenac) gel every night at bedtime. You can use another 3 times a day if needed    3. Use a sliding scale with Novolog at dinner.     Blood glucose (BG) .  Give 6 units   For  - 200 give 8 unit.  For  - 250 give 10 unit.  For  - 300 give 12 units.  For  - 350 give 14 units  Over 350 give 16 units         Follow-up: 1 month    It was great to speak with you today.  I value your experience and would be very thankful for your time with providing feedback on our clinic survey. You may receive a survey via email or text message in the next few days.     To schedule another MTM appointment, please call the clinic directly or you may call the MTM scheduling line at [phone] or toll-free at [phone].     My Clinical Pharmacist's contact information:                                                      Please feel free to contact me with any questions or concerns you have.      Eugenia De Jesus, PharmD, BCACP   Medication Management Pharmacist   [facility]  [phone]   Fax [phone]

## 2022-02-23 NOTE — PROGRESS NOTES
Medication Therapy Management (MTM) Encounter    ASSESSMENT:                            Medication Adherence/Access: No issues identified    Type 2 Diabetes: A1c not at goal <7% and the fasting glucose not at goal . Glucose has improved since rib pain has reduced and with dose changes in basal insulin. Discussed sliding scale per RN request; patient does not want to use outside of the group home (ie at drop in center) due to complexity so will just use at dinner meal.  Estimate rule of 1800 ~25mg/dL drop in glucose for every unit of insulin. Will use correction scale of 2 units for every 50 starting at 150.   Also encouraged resumption of exercise as tolerated.    Hypertension: Patient's blood pressure at goal of less than 140/90. Recommend patient to decrease the morning dose of metoprolol due to side effects.    Rib Pain: stable.     Immunization: Covid booster today    PLAN:                            1) Add Novolog sliding scale at dinner, 2 units for every 50 mg/dL over 150; max 10 units.  2) Decrease metoprolol to 50mg in the morning and 100mg at night   3) Moderna Covid booster today    Follow-up: 1 month     SUBJECTIVE/OBJECTIVE:                          Nena Tang is a 62 year old female coming in for a follow-up visit. Patient was accompanied by group home nurse. Today's visit is a follow-up MTM visit from 2/2/22     Reason for visit: blood pressure and blood glucose check.    Allergies/ADRs: Reviewed in chart  Past Medical History: Reviewed in chart  Tobacco: She reports that she has never smoked. She has never used smokeless tobacco.  Alcohol: none      Medication Adherence/Access: no issues reported    Type 2 Diabetes:  Currently taking Novolog  6 units 3 times daily with meals, lantus 26 units 2 times daily, trulicity 1.5 mg once weekly. Nurse would like a sliding scale insulin option.   Blood sugar monitoring: Continuous Glucose Monitor. Ranges (based on glucometer readings):          Symptoms of low blood sugar? none  Symptoms of high blood sugar? none  Eye exam: up to date  Foot exam: up to date  Diet/Exercise: patient willing to exercise.   Aspirin: Taking 81mg daily  Statin: Yes: atorvastatin 80 mg  ACEi/ARB: Yes: losartan 100 mg.   Urine Albumin:   Lab Results   Component Value Date    UMALCR 9.40 07/27/2021      Lab Results   Component Value Date    A1C 8.1 09/25/2021    A1C 10.0 08/10/2021    A1C 9.5 07/27/2021    A1C 9.1 12/01/2020    A1C 9.7 09/15/2020    A1C 8.6 06/30/2020    A1C 6.2 12/03/2019    A1C 6.6 08/06/2019       Hypertension: Current medications include losartan 100 mg; 1 tablet daily, metoprolol  mg; take 1 tablet 2 times daily. Patient has blood pressure monitored by group home staff. Ranges systolic 100-140s and 50-80s. Patient reports the following medication side effects: dizziness when getting up, weak legs, and feelings of imbalance. No falls.    BP Readings from Last 3 Encounters:   02/23/22 126/80   02/02/22 (!) 144/72   01/28/22 (!) 172/74     Rib Pain: Currently taking acetaminophen 650 mg at bedtime and additional 650 mg every 6 hours as needed, naproxen 375 mg, take 1 tablet 2 times daily as needed, and Voltaren 1%, apply 4 g 4 times daily. Nurse would like to change the dose of Voltaren to as needed because Nena only want to use it at bedtime.    Immunization: Patient reports to not have gotten the booster shot for COVID-19. Patient willing to take the booster today, Moderna.       Today's Vitals: /80   Pulse 74   Temp 97.6  F (36.4  C) (Temporal)   Ht 5' (1.524 m)   Wt 240 lb 8 oz (109.1 kg)   LMP 01/06/2015 (Exact Date)   SpO2 98%   BMI 46.97 kg/m    ----------------    I spent 30 minutes with this patient today. All changes were made via collaborative practice agreement with ART Fay CNP. A copy of the visit note was provided to the patient's provider(s).    The patient was given a summary of these  recommendations.     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

## 2022-02-24 ENCOUNTER — TELEPHONE (OUTPATIENT)
Dept: FAMILY MEDICINE | Facility: CLINIC | Age: 62
End: 2022-02-24
Payer: COMMERCIAL

## 2022-02-24 DIAGNOSIS — I10 HTN, GOAL BELOW 140/90: ICD-10-CM

## 2022-02-24 RX ORDER — METOPROLOL SUCCINATE 50 MG/1
TABLET, EXTENDED RELEASE ORAL
Qty: 90 TABLET | Refills: 3 | Status: SHIPPED | OUTPATIENT
Start: 2022-02-24 | End: 2022-02-25

## 2022-02-24 NOTE — TELEPHONE ENCOUNTER
Pharmacy calling to clarify number of tablets sent. metoprolol succinate ER (TOPROL-XL) 50 MG 24 hr tablet    With new directions she would need 120 tablets.    Please call with any questions

## 2022-02-25 LAB
CREATININE (EXTERNAL): 1.14 MG/DL (ref 0.55–1.02)
GFR ESTIMATED (EXTERNAL): 55 ML/MIN/1.73M2
GLUCOSE (EXTERNAL): 236 MG/DL (ref 70–100)
HBA1C MFR BLD: 7.7 %
POTASSIUM (EXTERNAL): 4.2 MMOL/L (ref 3.5–5.1)

## 2022-02-25 RX ORDER — METOPROLOL SUCCINATE 50 MG/1
TABLET, EXTENDED RELEASE ORAL
Qty: 120 TABLET | Refills: 3 | Status: SHIPPED | OUTPATIENT
Start: 2022-02-25 | End: 2022-06-28

## 2022-02-25 NOTE — TELEPHONE ENCOUNTER
Prescription resent. Although the amount was right? 3 tablets per day for 30 days would be 90 tablets/month.   ART Oliver CNP

## 2022-02-26 LAB
CREATININE (EXTERNAL): 1.31 MG/DL (ref 0.55–1.02)
GFR ESTIMATED (EXTERNAL): 46 ML/MIN/1.73M2
GLUCOSE (EXTERNAL): 109 MG/DL (ref 70–100)
POTASSIUM (EXTERNAL): 4.3 MMOL/L (ref 3.5–5.1)

## 2022-02-27 ENCOUNTER — TRANSFERRED RECORDS (OUTPATIENT)
Dept: HEALTH INFORMATION MANAGEMENT | Facility: CLINIC | Age: 62
End: 2022-02-27
Payer: COMMERCIAL

## 2022-02-27 LAB
CREATININE (EXTERNAL): 1.19 MG/DL (ref 0.55–1.02)
GFR ESTIMATED (EXTERNAL): 52 ML/MIN/1.73M2
GLUCOSE (EXTERNAL): 106 MG/DL (ref 70–100)
POTASSIUM (EXTERNAL): 4 MMOL/L (ref 3.5–5.1)

## 2022-03-08 ENCOUNTER — VIRTUAL VISIT (OUTPATIENT)
Dept: SLEEP MEDICINE | Facility: CLINIC | Age: 62
End: 2022-03-08
Payer: COMMERCIAL

## 2022-03-08 VITALS
SYSTOLIC BLOOD PRESSURE: 122 MMHG | DIASTOLIC BLOOD PRESSURE: 47 MMHG | BODY MASS INDEX: 45.16 KG/M2 | WEIGHT: 230 LBS | HEIGHT: 60 IN

## 2022-03-08 DIAGNOSIS — F51.04 PSYCHOPHYSIOLOGICAL INSOMNIA: ICD-10-CM

## 2022-03-08 DIAGNOSIS — G25.81 RESTLESS LEGS SYNDROME (RLS): ICD-10-CM

## 2022-03-08 DIAGNOSIS — G47.33 OSA (OBSTRUCTIVE SLEEP APNEA): Primary | ICD-10-CM

## 2022-03-08 PROCEDURE — 99213 OFFICE O/P EST LOW 20 MIN: CPT | Mod: 95 | Performed by: PHYSICIAN ASSISTANT

## 2022-03-08 ASSESSMENT — SLEEP AND FATIGUE QUESTIONNAIRES
HOW LIKELY ARE YOU TO NOD OFF OR FALL ASLEEP WHILE SITTING AND READING: MODERATE CHANCE OF DOZING
HOW LIKELY ARE YOU TO NOD OFF OR FALL ASLEEP WHILE LYING DOWN TO REST IN THE AFTERNOON WHEN CIRCUMSTANCES PERMIT: HIGH CHANCE OF DOZING
HOW LIKELY ARE YOU TO NOD OFF OR FALL ASLEEP IN A CAR, WHILE STOPPED FOR A FEW MINUTES IN TRAFFIC: WOULD NEVER DOZE
HOW LIKELY ARE YOU TO NOD OFF OR FALL ASLEEP WHILE SITTING AND TALKING TO SOMEONE: HIGH CHANCE OF DOZING
HOW LIKELY ARE YOU TO NOD OFF OR FALL ASLEEP WHILE SITTING INACTIVE IN A PUBLIC PLACE: HIGH CHANCE OF DOZING
HOW LIKELY ARE YOU TO NOD OFF OR FALL ASLEEP WHEN YOU ARE A PASSENGER IN A CAR FOR AN HOUR WITHOUT A BREAK: HIGH CHANCE OF DOZING
HOW LIKELY ARE YOU TO NOD OFF OR FALL ASLEEP WHILE SITTING QUIETLY AFTER LUNCH WITHOUT ALCOHOL: HIGH CHANCE OF DOZING
HOW LIKELY ARE YOU TO NOD OFF OR FALL ASLEEP WHILE WATCHING TV: HIGH CHANCE OF DOZING

## 2022-03-08 NOTE — PROGRESS NOTES
Nena is a 62 year old who is being evaluated via a billable video visit.      How would you like to obtain your AVS? Mail a copy  If the video visit is dropped, the invitation should be resent by: Text to cell phone: [phone]  Will anyone else be joining your video visit? Yes: Supervior of Assistant Marli Grissom. How would they like to receive their invitation? Text to cell phone: [phone]       Liliana Lynch    Video Start Time: 8:32 AM  Video-Visit Details    Type of service:  Video Visit    Video End Time:8:45 AM    Originating Location (pt. Location): Home    Distant Location (provider location):  [facility] SLEEP [facility]     Platform used for Video Visit: AlertMe

## 2022-03-08 NOTE — PROGRESS NOTES
CPAP Follow-Up Visit:    Date on this visit: 3/8/2022    Nena Tang has a follow-up visit today to review her CPAP use for CINDY. S/He was initially seen for previously diagnosed CINDY. Her medical history is significant for irritable bowel syndrome, diabetes mellitus type 2, dyslipidemia, major depressive disorder, schizoaffective disorder, history of Takotsubo cardiomyopathy, migraine headaches, coronary artery disease with stent placement, and hypertension.     Nena had a PSG at [facility] on 10/16/2019.  Her AHI was 62.5/hr. Her O2 nimco was 70% and she had 51.4 minutes below 89%. CPAP was effective at 13 cm although REM supine was not observed. Her weight was 242#.      At her last visit, she felt the pressures were too high and she had trouble getting the mask to seal. I reduced the pressures from 13-18 cm to 10-18 cm.   She has not been using CPAP because she fractured her ribs. Her ribs are getting better.      PAP machine: ResMed. Pressure settings: 10-18 cm    There is no download data as she has not been using CPAP       Interface:  Mask: full face mask   Chin strap: No  Leak: She replaced her mask since November and it seemed to fit better.       Weight change since sleep study: 230 lbs. Was 213 last November.    Bedtime: 12 AM.  Wake time: 8 AM. Falls asleep in 30 to 60 minutes with medication. Wakes 2 times per night for 5-10 minutes. Reason for waking: restroom  Naps: she denies napping. She does a program in the day 9 AM to 2:30 PM M,T and Th.        She is also on amantadine that she has been on for years as well as 10 mg of Lexapro, gabapentin 300 at bedtime and paliperidone 9 mg a day.  She is also on Topamax and trazodone.  She was recently started on 25 mg quetiapine. The quetiapine has been helpful.    She continues to get restless legs in the evening. She says that happens when she is sitting down too much. It can happen a couple of days per week. That is most likely to occur in the  afternoon. It may interfere with sleep a little. She does not know if the gabapentin is doing anything, she has been on it a long time and is on a lot of medication, so it is hard to tell.    Past medical/surgical history, family history, social history, medications and allergies were reviewed.      Problem List:  Patient Active Problem List    Diagnosis Date Noted     CAD (coronary artery disease) 02/29/2012     Priority: High      Tako-Tsubo stress cardiomyopathy 2009. Mild coronary artery disease,        Dehydration 09/25/2021     Priority: Medium     Hypoglycemia 09/25/2021     Priority: Medium     Acute kidney injury (H) 09/25/2021     Priority: Medium     Diarrhea, unspecified type 09/25/2021     Priority: Medium     2019 novel coronavirus disease (COVID-19) 09/25/2021     Priority: Medium     Type 2 diabetes mellitus without complication, with long-term current use of insulin (H) 08/10/2021     Priority: Medium     Chronic pain syndrome 01/19/2021     Priority: Medium     Fibromyalgia 01/19/2021     Priority: Medium     Mixed stress and urge urinary incontinence 06/01/2020     Priority: Medium     Depression with suicidal ideation 12/21/2019     Priority: Medium     Pneumonia 07/28/2019     Priority: Medium     Hyperglycemia 05/15/2019     Priority: Medium     Positive AIDA (antinuclear antibody) 01/31/2019     Priority: Medium     Type 2 diabetes mellitus with stage 3 chronic kidney disease, with long-term current use of insulin (H) 06/05/2018     Priority: Medium     Cervical cancer screening 06/01/2018     Priority: Medium     Pt age 58  05/22/18: She had a total hysterectomy 35 years ago for uterine cancer, NIL pap, Neg HR HPV result. Plan cease pap screening per provider.  No history of MEHREEN 2 or greater found in epic.   No further cervical cancer screening recommended.    updated.              Posttraumatic stress disorder 01/29/2018     Priority: Medium     Gastroenteritis 12/08/2017     Priority:  Medium     Thoughts of self harm 10/23/2017     Priority: Medium     Infectious encephalopathy 09/04/2017     Priority: Medium     Non-intractable vomiting with nausea 09/04/2017     Priority: Medium     Abdominal pain, right lower quadrant 07/13/2017     Priority: Medium     Asymptomatic postmenopausal status 06/28/2017     Priority: Medium     Dysuria 06/12/2017     Priority: Medium     Psychophysiological insomnia 03/09/2017     Priority: Medium     History of uterine cancer 12/07/2016     Priority: Medium     Yearly pap's per the provider office visit note on 12/07/16.  05/22/18: She had a total hysterectomy 35 years ago for uterine cancer, NIL pap, Neg HR HPV result. Plan cease pap screening per provider.  11/1/19 NIL, Neg HPV. No further screening per provider.         Falls frequently 08/18/2016     Priority: Medium     Left cataract 07/25/2016     Priority: Medium     Alcohol use 04/19/2016     Priority: Medium     Tardive dyskinesia 09/11/2015     Priority: Medium     Mild nonproliferative diabetic retinopathy (H) 06/19/2014     Priority: Medium     Problem list name updated by automated process. Provider to review       Esophageal reflux 06/09/2014     Priority: Medium     Suicidal ideation 05/01/2014     Priority: Medium     Cocaine abuse, episodic (H) 10/03/2013     Priority: Medium     Lumbago 09/20/2013     Priority: Medium     Cervicalgia 09/20/2013     Priority: Medium     Health Care Home 08/16/2013     Priority: Medium     EMERGENCY CARE PLAN  Presenting Problem Signs and Symptoms Treatment Plan    Questions or concerns during clinic hours    I will call the clinic directly     Questions or concerns outside clinic hours    I will call the 24 hour nurse line at [phone]    Patient needs to schedule an appointment    I will call the 24 hour scheduling team at [phone] or clinic directly    Same day treatment     I will call the clinic first, nurse line if after hours, urgent care and express  care if needed     Primary Care Provider Dr. Honorio Dias [facility] [phone]  Nurse Care Coordinator Idalmis Nettles -[phone]        HTN, goal below 140/90 07/29/2013     Priority: Medium     Migraine headache 04/22/2013     Priority: Medium     Schizoaffective disorder, depressive type (H) 02/25/2013     Priority: Medium     Chronic low back pain 01/22/2013     Priority: Medium     Verbal auditory hallucination 10/04/2012     Priority: Medium     CINDY (obstructive sleep apnea)- mild AHI 10.3 03/08/2012     Priority: Medium     Sleep study 3/12 (198#)-   AHI 10.3, with significant desaturations down to 74%. RDI 10.3. Periodic Limb Movement Index 3.2/hour.         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 Echo 2010, angio 2011 with normal LVF.        Vitamin B12 deficiency without anemia 11/23/2009     Priority: Low     Diagnosis updated by automated process. Provider to review and confirm.       Osteopenia 10/07/2009     Priority: Low     Dexa 2009- Lumbar Spine (L1-L4): T-score -1.8, Left Femoral Neck: T-score -1.0, Right Femoral Neck: T-score -1.0               Impression/Plan:    (G47.33) CINDY (obstructive sleep apnea)- mild AHI 10.3  (primary encounter diagnosis)  Comment: She has not been using CPAP as she broke her ribs. Her ribs are healing and she plans to get back on the CPAP. She states she has everything she needs to get back on it.  Plan: Resume auto CPAP 10-18 cm. Let me know if the pressures are uncomfortable    (F51.04) Psychophysiological  insomnia  Comment: She has been sleeping better with 25 mg quetiapine.  Plan: Continue current management.    (G25.81) Restless legs syndrome (RLS)  Comment: She has occasional symptoms, more in the day but some at night. Hard to tell how much the gabapentin is really doing. Ferritin was 120 in 1/2022.  Plan: Could consider discontinuing gabapentin to see if it really benefiting her.     She will follow up with me in about 4 month(s), once on CPAP again.     21 minutes were spent on the date of the encounter doing chart review, history and exam, documentation and further activities as noted above.     Bennett Goltz, PA-C    CC: No ref. provider found

## 2022-03-23 NOTE — PROGRESS NOTES
[facility]   February 27, 2019    Behavioral Health Clinician Progress Note    Patient Name: Nena Tang           Service Type:  Individual      Service Location:   Face to Face in Clinic     Session Start Time: 11:37am Session End Time: 12:05pm      Session Length: 16 - 37      Attendees: Patient    Visit Activities (Refresh list every visit): [facility]    Diagnostic Assessment Date: 1-23-18  Treatment Plan Review Date: Not completed yet  See Flowsheets for today's PHQ-9 and TAMIA-7 results  Previous PHQ-9:   PHQ-9 SCORE 2/3/2017 3/13/2018 10/26/2018   PHQ-9 Total Score - - -   PHQ-9 Total Score - - -   PHQ-9 Total Score 0 14 20     Previous TAMIA-7:   TAMIA-7 SCORE 2/3/2017 3/13/2018 10/26/2018   Total Score - - -   Total Score 0 17 19   Total Score - - -       ALEXANDRA LEVEL:  ALEXANDRA Score (Last Two) 8/30/2011 6/9/2014   ALEXANDRA Raw Score 42 37   Activation Score 66 49.9   ALEXANDRA Level 3 2       DATA  Extended Session (60+ minutes): No  Interactive Complexity: No  Crisis: No  [facility] Patient: No    Treatment Objective(s) Addressed in This Session:  Target Behavior(s): disease management/lifestyle changes mental health    Depressed Mood: Increase interest, engagement, and pleasure in doing things  Decrease frequency and intensity of feeling down, depressed, hopeless  Improve quantity and quality of night time sleep / decrease daytime naps  Feel less tired and more energy during the day   Identify negative self-talk and behaviors: challenge core beliefs, myths, and actions  Improve concentration, focus, and mindfulness in daily activities   Decrease thoughts that you'd be better off dead or of suicide / self-harm  Anxiety: will experience a reduction in anxiety, will develop more effective coping skills to manage anxiety symptoms, will develop healthy cognitive patterns and beliefs and will increase ability to function adaptively  Alcohol / Substance Use: continue to make healthy choices regarding  substance use and engage in activities / supportive services that promote sobriety  Thought Disturbance: will develop skills to more effectively manage symptoms, will develop more effective support systems and will continue to take medications as prescribed    Current Stressors / Issues:    The patient was seen by [facility] per the request of the PCP-she talked about being in intense pain and that she uses the prescription cream that helps just a little bit and she is taking a lot of Tylenol that is not helping with pain management-she is scheduled go see a specialist to address he pain symptoms-she has been having suicidal thoughts (just a little passing thought)-no intent to harm self-regarding mood she reported to be feeling really good lately-she purchased a new chair for her room-she has been having difficulty with sleeping-she is planing to start using the CPAP machine-she stated that she will take it one day at a time-she has a brighter light and this helps with feeling less paranoid about (the man)-we talked about the use of a scripture from [place] to help encourage engagement on safety to interrupt the fear thoughts that she is suffocating-she talked about her limited movements and functioning due to her pain-she had discussion of her pain treatment with PCP-patient agreed to treatment plan while she waits to be seen by specialist-no voice and less visual hallucinations due to new light-appetite has been good-     Progress on Treatment Objective(s) / Homework:  Minimal progress - ACTION (Actively working towards change); Intervened by reinforcing change plan / affirming steps taken    Motivational Interviewing    MI Intervention: Expressed Empathy/Understanding, Supported Autonomy, Collaboration, Evocation, Permission to raise concern or advise, Open-ended questions, Reflections: simple and complex, Rolled with resistance: Emphasized patient autonomy, Simple reflection and Evoked patient agenda and Change talk  (evoked)     Change Talk Expressed by the Patient: Desire to change Ability to change Reasons to change Need to change Committment to change Activation Taking steps    Provider Response to Change Talk: E - Evoked more info from patient about behavior change, A - Affirmed patient's thoughts, decisions, or attempts at behavior change, R - Reflected patient's change talk and S - Summarized patient's change talk statements      Care Plan review completed: No    Medication Review:  No changes to current psychiatric medication(s)     Medication Compliance:  NA    Changes in Health Issues:   None reported     Chemical Use Review:   Substance Use: Chemical use reviewed, no active concerns identified      Tobacco Use: No current tobacco use.      Assessment: Current Emotional / Mental Status (status of significant symptoms):  Risk status (Self / Other harm or suicidal ideation)  Patient has had a history of suicidal ideation: yes  Patient denies current fears or concerns for personal safety.  Patient reports the following current or recent suicidal ideation or behaviors: passive suicidal thoughts.  Patient denies current or recent homicidal ideation or behaviors.  Patient denies current or recent self injurious behavior or ideation.  Patient denies other safety concerns.  A safety and risk management plan has not been developed at this time, however patient was encouraged to call [place] [phone] should there be a change in any of these risk factors.    Appearance:   Appropriate   Eye Contact:   Good   Psychomotor Behavior: Normal   Attitude:   Cooperative   Orientation:   All  Speech   Rate / Production: Normal    Volume:  Normal   Mood:    Normal  Affect:    Appropriate  Bright  Worrisome   Thought Content:  Clear   Thought Form:  Coherent   Insight:    Good     Diagnoses:  1. Schizoaffective disorder, depressive type (H)    2. Posttraumatic stress disorder        Collateral Reports Completed:  Not Applicable    Plan:  (Homework, other):  Patient was given information about behavioral services and encouraged to schedule a follow up appointment with the clinic [facility] as needed.  She was also given information about mental health symptoms and treatment options .  CD Recommendations: Maintain Sobriety.    BIN George, [facility]      ______________________________________________________________________   No excercise

## 2022-03-24 ENCOUNTER — MEDICAL CORRESPONDENCE (OUTPATIENT)
Dept: HEALTH INFORMATION MANAGEMENT | Facility: CLINIC | Age: 62
End: 2022-03-24

## 2022-03-24 ENCOUNTER — OFFICE VISIT (OUTPATIENT)
Dept: PHARMACY | Facility: CLINIC | Age: 62
End: 2022-03-24
Payer: COMMERCIAL

## 2022-03-24 ENCOUNTER — LAB (OUTPATIENT)
Dept: LAB | Facility: CLINIC | Age: 62
End: 2022-03-24

## 2022-03-24 ENCOUNTER — OFFICE VISIT (OUTPATIENT)
Dept: FAMILY MEDICINE | Facility: CLINIC | Age: 62
End: 2022-03-24
Payer: COMMERCIAL

## 2022-03-24 VITALS
WEIGHT: 238 LBS | OXYGEN SATURATION: 98 % | BODY MASS INDEX: 46.72 KG/M2 | HEART RATE: 72 BPM | DIASTOLIC BLOOD PRESSURE: 72 MMHG | SYSTOLIC BLOOD PRESSURE: 153 MMHG | TEMPERATURE: 97 F | HEIGHT: 60 IN

## 2022-03-24 VITALS
DIASTOLIC BLOOD PRESSURE: 72 MMHG | OXYGEN SATURATION: 98 % | HEART RATE: 72 BPM | TEMPERATURE: 97 F | HEIGHT: 60 IN | BODY MASS INDEX: 46.72 KG/M2 | WEIGHT: 238 LBS | SYSTOLIC BLOOD PRESSURE: 153 MMHG

## 2022-03-24 DIAGNOSIS — G89.4 CHRONIC PAIN SYNDROME: ICD-10-CM

## 2022-03-24 DIAGNOSIS — M79.7 FIBROMYALGIA: ICD-10-CM

## 2022-03-24 DIAGNOSIS — R30.0 DYSURIA: ICD-10-CM

## 2022-03-24 DIAGNOSIS — Z13.220 SCREENING FOR HYPERLIPIDEMIA: ICD-10-CM

## 2022-03-24 DIAGNOSIS — E11.65 TYPE 2 DIABETES MELLITUS WITH HYPERGLYCEMIA, WITH LONG-TERM CURRENT USE OF INSULIN (H): Primary | ICD-10-CM

## 2022-03-24 DIAGNOSIS — G47.33 OSA (OBSTRUCTIVE SLEEP APNEA): ICD-10-CM

## 2022-03-24 DIAGNOSIS — F25.1 SCHIZOAFFECTIVE DISORDER, DEPRESSIVE TYPE (H): ICD-10-CM

## 2022-03-24 DIAGNOSIS — E11.9 TYPE 2 DIABETES MELLITUS WITHOUT COMPLICATION, WITH LONG-TERM CURRENT USE OF INSULIN (H): ICD-10-CM

## 2022-03-24 DIAGNOSIS — K21.9 GASTROESOPHAGEAL REFLUX DISEASE WITHOUT ESOPHAGITIS: Primary | ICD-10-CM

## 2022-03-24 DIAGNOSIS — Z79.4 TYPE 2 DIABETES MELLITUS WITH HYPERGLYCEMIA, WITH LONG-TERM CURRENT USE OF INSULIN (H): ICD-10-CM

## 2022-03-24 DIAGNOSIS — Z79.4 TYPE 2 DIABETES MELLITUS WITHOUT COMPLICATION, WITH LONG-TERM CURRENT USE OF INSULIN (H): ICD-10-CM

## 2022-03-24 DIAGNOSIS — I10 HTN, GOAL BELOW 140/90: ICD-10-CM

## 2022-03-24 DIAGNOSIS — F51.04 PSYCHOPHYSIOLOGICAL INSOMNIA: ICD-10-CM

## 2022-03-24 DIAGNOSIS — E11.65 TYPE 2 DIABETES MELLITUS WITH HYPERGLYCEMIA, WITH LONG-TERM CURRENT USE OF INSULIN (H): ICD-10-CM

## 2022-03-24 DIAGNOSIS — K21.00 GASTROESOPHAGEAL REFLUX DISEASE WITH ESOPHAGITIS WITHOUT HEMORRHAGE: ICD-10-CM

## 2022-03-24 DIAGNOSIS — Z79.4 TYPE 2 DIABETES MELLITUS WITH HYPERGLYCEMIA, WITH LONG-TERM CURRENT USE OF INSULIN (H): Primary | ICD-10-CM

## 2022-03-24 LAB
ALBUMIN SERPL-MCNC: 3.7 G/DL (ref 3.4–5)
ALBUMIN UR-MCNC: NEGATIVE MG/DL
ALP SERPL-CCNC: 100 U/L (ref 40–150)
ALT SERPL W P-5'-P-CCNC: 27 U/L (ref 0–50)
ANION GAP SERPL CALCULATED.3IONS-SCNC: 7 MMOL/L (ref 3–14)
APPEARANCE UR: CLEAR
AST SERPL W P-5'-P-CCNC: 11 U/L (ref 0–45)
BACTERIA #/AREA URNS HPF: ABNORMAL /HPF
BILIRUB SERPL-MCNC: 0.4 MG/DL (ref 0.2–1.3)
BILIRUB UR QL STRIP: NEGATIVE
BUN SERPL-MCNC: 17 MG/DL (ref 7–30)
CALCIUM SERPL-MCNC: 9.7 MG/DL (ref 8.5–10.1)
CHLORIDE BLD-SCNC: 110 MMOL/L (ref 94–109)
CHOLEST SERPL-MCNC: 173 MG/DL
CO2 SERPL-SCNC: 20 MMOL/L (ref 20–32)
COLOR UR AUTO: YELLOW
CREAT SERPL-MCNC: 1.06 MG/DL (ref 0.52–1.04)
FASTING STATUS PATIENT QL REPORTED: YES
GFR SERPL CREATININE-BSD FRML MDRD: 59 ML/MIN/1.73M2
GLUCOSE BLD-MCNC: 147 MG/DL (ref 70–99)
GLUCOSE UR STRIP-MCNC: NEGATIVE MG/DL
HBA1C MFR BLD: 7.3 % (ref 0–5.6)
HDLC SERPL-MCNC: 54 MG/DL
HGB UR QL STRIP: NEGATIVE
KETONES UR STRIP-MCNC: NEGATIVE MG/DL
LDLC SERPL CALC-MCNC: 81 MG/DL
LEUKOCYTE ESTERASE UR QL STRIP: NEGATIVE
NITRATE UR QL: NEGATIVE
NONHDLC SERPL-MCNC: 119 MG/DL
PH UR STRIP: 7 [PH] (ref 5–7)
POTASSIUM BLD-SCNC: 4.5 MMOL/L (ref 3.4–5.3)
PROT SERPL-MCNC: 7.9 G/DL (ref 6.8–8.8)
RBC #/AREA URNS AUTO: ABNORMAL /HPF
SODIUM SERPL-SCNC: 137 MMOL/L (ref 133–144)
SP GR UR STRIP: 1.02 (ref 1–1.03)
SQUAMOUS #/AREA URNS AUTO: ABNORMAL /LPF
TRIGL SERPL-MCNC: 190 MG/DL
UROBILINOGEN UR STRIP-ACNC: 0.2 E.U./DL
WBC #/AREA URNS AUTO: ABNORMAL /HPF

## 2022-03-24 PROCEDURE — 99607 MTMS BY PHARM ADDL 15 MIN: CPT | Performed by: PHARMACIST

## 2022-03-24 PROCEDURE — 99215 OFFICE O/P EST HI 40 MIN: CPT | Performed by: NURSE PRACTITIONER

## 2022-03-24 PROCEDURE — 80053 COMPREHEN METABOLIC PANEL: CPT

## 2022-03-24 PROCEDURE — 80061 LIPID PANEL: CPT

## 2022-03-24 PROCEDURE — 83036 HEMOGLOBIN GLYCOSYLATED A1C: CPT

## 2022-03-24 PROCEDURE — 99606 MTMS BY PHARM EST 15 MIN: CPT | Performed by: PHARMACIST

## 2022-03-24 PROCEDURE — 81001 URINALYSIS AUTO W/SCOPE: CPT

## 2022-03-24 PROCEDURE — 36415 COLL VENOUS BLD VENIPUNCTURE: CPT

## 2022-03-24 RX ORDER — FAMOTIDINE 20 MG/1
20 TABLET, FILM COATED ORAL 2 TIMES DAILY PRN
Qty: 90 TABLET | Refills: 1 | Status: SHIPPED | OUTPATIENT
Start: 2022-03-24 | End: 2022-05-09

## 2022-03-24 NOTE — PROGRESS NOTES
Assessment & Plan     Gastroesophageal reflux disease without esophagitis  Needs improvement, will increase famotidine to BID and follow-up in 4 weeks for improvements.  - famotidine (PEPCID) 20 MG tablet; Take 1 tablet (20 mg) by mouth 2 times daily as needed (GERD)    Chronic pain syndrome  Fibromyalgia  Labile pain issues. Will start patient back on PT.   - Physical Therapy Referral; Future    Dysuria  Frequent UTI;s and symptoms with no improvements after antibiotic use. Will refer to urology. And also discussed imaging to see if other etiologies are the issue.   - UA with Microscopic reflex to Culture - lab collect; Future  - Adult Urology Referral; Future    Schizoaffective disorder, depressive type (H)  Psychophysiological insomnia  Stable but needs to establish with psychiatry and therapy.   - Adult Mental Health  Referral; Future    Type 2 diabetes mellitus with hyperglycemia, with long-term current use of insulin (H)  Overall improving since moving to the [facility] home. Will update her blood work today and follow-up as scheduled.   - Basic metabolic panel  (Ca, Cl, CO2, Creat, Gluc, K, Na, BUN); Future    Ordering of each unique test  Prescription drug management  I spent a total of 47 minutes on the day of the visit.   Time spent doing chart review, history and exam, documentation and further activities per the note       Patient Instructions   - Increase Famotidine to twice per day.   - Physical therapy ordered.   - Urology referral placed.   - Psychiatry referral placed.   Call clinic with any questions or concerns.           Return in about 4 weeks (around 4/21/2022) for Routine Visit.    ART Fay [facility]    Surjit Barrett is a 62 year old who presents for the following health issues:     HPI     Diabetes: Blood sugars are improving. In general ranging between . Due for an update on her A1C today. Will follow up as needed.      Hypertension: Still having headaches, and dizziness. Denies any chest pain or shortness of breath.   BP's rangin-165/51-87. Will continue to monitor.   Patient reports that she has been drinking water about 20 oz per day. Drinking Gatorade about 2-3 bottles of (20 oz) per day.   BP Readings from Last 3 Encounters:   22 (!) 153/72   22 (!) 153/72   22 122/47     GERD: Patient reports waking up at night coughing with bitterness in her mouth. Reports that she has been taking her medications as prescribed.     CPAP: Currently not using her machine and this has been causing her some sleep issues. Plan is to change her bed position and see if this would be helpful.     Mental Health: Mood has been ok. Referral placed for both psychiatry and therapy to help patient maintain her mental health balance.     Review of Systems   Constitutional, HEENT, cardiovascular, pulmonary, gi and gu systems are negative, except as otherwise noted.      Objective    BP (!) 153/72   Pulse 72   Temp 97  F (36.1  C)   Ht 1.524 m (5')   Wt 108 kg (238 lb)   LMP 2015 (Exact Date)   SpO2 98%   BMI 46.48 kg/m    Body mass index is 46.48 kg/m .  Physical Exam   GENERAL: healthy, alert and no distress  EYES: Eyes grossly normal to inspection, PERRL and conjunctivae and sclerae normal  NECK: no adenopathy, no asymmetry, masses, or scars and thyroid normal to palpation  RESP: lungs clear to auscultation - no rales, rhonchi or wheezes  CV: regular rate and rhythm, normal S1 S2, no S3 or S4, no murmur, click or rub, no peripheral edema and peripheral pulses strong  ABDOMEN: soft, nontender, no hepatosplenomegaly, no masses and bowel sounds normal  MS: no gross musculoskeletal defects noted, no edema  NEURO: Normal strength and tone, mentation intact and speech normal    Results for orders placed or performed in visit on 22   UA with Microscopic reflex to Culture - lab collect     Status: Normal    Specimen:  Urine, Midstream   Result Value Ref Range    Color Urine Yellow Colorless, Straw, Light Yellow, Yellow    Appearance Urine Clear Clear    Glucose Urine Negative Negative mg/dL    Bilirubin Urine Negative Negative    Ketones Urine Negative Negative mg/dL    Specific Gravity Urine 1.020 1.003 - 1.035    Blood Urine Negative Negative    pH Urine 7.0 5.0 - 7.0    Protein Albumin Urine Negative Negative mg/dL    Urobilinogen Urine 0.2 0.2, 1.0 E.U./dL    Nitrite Urine Negative Negative    Leukocyte Esterase Urine Negative Negative   Lipid panel reflex to direct LDL Fasting     Status: Abnormal   Result Value Ref Range    Cholesterol 173 <200 mg/dL    Triglycerides 190 (H) <150 mg/dL    Direct Measure HDL 54 >=50 mg/dL    LDL Cholesterol Calculated 81 <=100 mg/dL    Non HDL Cholesterol 119 <130 mg/dL    Patient Fasting > 8hrs? Yes     Narrative    Cholesterol  Desirable:  <200 mg/dL    Triglycerides  Normal:  Less than 150 mg/dL  Borderline High:  150-199 mg/dL  High:  200-499 mg/dL  Very High:  Greater than or equal to 500 mg/dL    Direct Measure HDL  Female:  Greater than or equal to 50 mg/dL   Male:  Greater than or equal to 40 mg/dL    LDL Cholesterol  Desirable:  <100mg/dL  Above Desirable:  100-129 mg/dL   Borderline High:  130-159 mg/dL   High:  160-189 mg/dL   Very High:  >= 190 mg/dL    Non HDL Cholesterol  Desirable:  130 mg/dL  Above Desirable:  130-159 mg/dL  Borderline High:  160-189 mg/dL  High:  190-219 mg/dL  Very High:  Greater than or equal to 220 mg/dL   HEMOGLOBIN A1C     Status: Abnormal   Result Value Ref Range    Hemoglobin A1C 7.3 (H) 0.0 - 5.6 %   COMPREHENSIVE METABOLIC PANEL     Status: Abnormal   Result Value Ref Range    Sodium 137 133 - 144 mmol/L    Potassium 4.5 3.4 - 5.3 mmol/L    Chloride 110 (H) 94 - 109 mmol/L    Carbon Dioxide (CO2) 20 20 - 32 mmol/L    Anion Gap 7 3 - 14 mmol/L    Urea Nitrogen 17 7 - 30 mg/dL    Creatinine 1.06 (H) 0.52 - 1.04 mg/dL    Calcium 9.7 8.5 - 10.1 mg/dL     Glucose 147 (H) 70 - 99 mg/dL    Alkaline Phosphatase 100 40 - 150 U/L    AST 11 0 - 45 U/L    ALT 27 0 - 50 U/L    Protein Total 7.9 6.8 - 8.8 g/dL    Albumin 3.7 3.4 - 5.0 g/dL    Bilirubin Total 0.4 0.2 - 1.3 mg/dL    GFR Estimate 59 (L) >60 mL/min/1.73m2   Urine Microscopic     Status: Abnormal   Result Value Ref Range    Bacteria Urine Few (A) None Seen /HPF    RBC Urine 0-2 0-2 /HPF /HPF    WBC Urine 0-5 0-5 /HPF /HPF    Squamous Epithelials Urine Few (A) None Seen /LPF    Narrative    Urine Culture not indicated       Roomed by Tamiko Hernandez, CF

## 2022-03-24 NOTE — PROGRESS NOTES
Medication Therapy Management (MTM) Encounter    ASSESSMENT:                            Medication Adherence/Access: See below for considerations    Frequent UTI: see PCP notes for workup    Type 2 Diabetes: A1c at goal <8%. Continues to make incremental improvement in her glucose control as rib pain reduces; provided encouragement for additional diet changes. No medication changes today.     Hypertension: BP elevated today, has been variable per home reading log. Encouraged reduction in sodium intake from diet and ongoing monitoring. No medication change today.     Sleep apnea: patient will work on restarting CPAP with RN and group home staff support.    GERD: trial of increased frequency of famotidine and changing sleeping positions; referral to GI per PCP if symptoms don't improve.    PLAN:                            1. Increase famotidine to 20mg twice a day  2. Work on reducing salt and sugary drinks, consistency with Novolog    Follow-up: 1 month    SUBJECTIVE/OBJECTIVE:                          Nena Tang is a 62 year old female coming in for a follow-up visit. Today's visit is a co-visit with PCP. Today's visit is a follow-up MTM visit from 2/23/22   Accompanied to visit by group home RN.    Reason for visit: medication recheck.    Allergies/ADRs: Reviewed in chart  Past Medical History: Reviewed in chart  Tobacco: She reports that she has never smoked. She has never used smokeless tobacco.  Alcohol: none    Medication Adherence/Access: Difficulty with remembering Novolog consistently, no other missed medications.    Frequent UTI: concern about burning with urination, dark urine color, and foul odor - started 2 days ago. Had recently been treated with UTI in ED with ceftriaxone based on symptoms but urine culture was negative and no antibiotics sent home with her. See PCP notes for details.     Type 2 Diabetes:  Currently taking Novolog  6 units 3 times daily with meals (forgets to take Novolog a couple  times a week), lantus 26 units 2 times daily, Trulicity 1.5 mg once weekly.    Blood sugar monitoring: Continuous Glucose Monitor. Ranges (based on glucometer readings):       Symptoms of low blood sugar? none  Symptoms of high blood sugar? none  Eye exam: up to date  Foot exam: up to date  Diet/Exercise: exercises with drop in center 3 times per week. Having coffee with sugar x2 cups and gatorade x2 bottles every day lately.   Aspirin: Taking 81mg daily  Statin: Yes: atorvastatin 80 mg  ACEi/ARB: Yes: losartan 100 mg.   Urine Albumin:   Lab Results   Component Value Date    UMALCR 9.40 07/27/2021      Per CareEverywhere: 2/25/22 A1c 7.7  Lab Results   Component Value Date    A1C 8.1 09/25/2021    A1C 10.0 08/10/2021    A1C 9.5 07/27/2021    A1C 9.1 12/01/2020    A1C 9.7 09/15/2020    A1C 8.6 06/30/2020    A1C 6.2 12/03/2019    A1C 6.6 08/06/2019     Hypertension: Current medications include losartan 100 mg; 1 tablet daily, metoprolol ER 50mg AM and 100mg PM. Patient has blood pressure monitored by group home staff. Ranges systolic 100-160s and diastolic 60-80s. Last few days increased pain and blood pressure is higher. Patient reports the following medication side effects: none.    BP Readings from Last 3 Encounters:   02/23/22 126/80   02/02/22 (!) 144/72   01/28/22 (!) 172/74     Sleep apnea: lying on her back to go to sleep instead of side, not comfortable to use CPAP and hasn't worn in months. Also has a choking sensation when lying down, see PCP notes for details.     GERD: currently taking pantoprazole 20mg once a day in AM and famotidine 40mg once a day in AM. Having a choking sensation at night when lying down on her back. Hasn't been able to sleep on her side like she wants.       Today's Vitals: BP (!) 153/72   Pulse 72   Temp 97  F (36.1  C)   Ht 5' (1.524 m)   Wt 238 lb (108 kg)   LMP 01/06/2015 (Exact Date)   SpO2 98%   BMI 46.48 kg/m     BP Readings from Last 1 Encounters:   03/24/22 (!) 153/72      Pulse Readings from Last 1 Encounters:   03/24/22 72     Wt Readings from Last 1 Encounters:   03/24/22 238 lb (108 kg)     Ht Readings from Last 1 Encounters:   03/24/22 5' (1.524 m)     Estimated body mass index is 46.48 kg/m  as calculated from the following:    Height as of this encounter: 5' (1.524 m).    Weight as of this encounter: 238 lb (108 kg).    Temp Readings from Last 1 Encounters:   03/24/22 97  F (36.1  C)      ----------------      I spent 40 minutes with this patient today. All changes were made via collaborative practice agreement with ART Fay CNP. A copy of the visit note was provided to the patient's provider(s).    The patient was given a summary of these recommendations. See Provider note/AVS from today.     Eugenia De Jesus, PharmD, BCACP        Medication Therapy Recommendations  Esophageal reflux    Current Medication: famotidine (PEPCID) 20 MG tablet   Rationale: Frequency inappropriate - Dosage too low - Effectiveness   Recommendation: Increase Frequency   Status: Accepted per Provider

## 2022-03-24 NOTE — PATIENT INSTRUCTIONS
- Increase Famotidine to twice per day.   - Physical therapy ordered.   - Urology referral placed.   - Psychiatry referral placed.   Call clinic with any questions or concerns.

## 2022-03-26 DIAGNOSIS — R10.31 RLQ ABDOMINAL PAIN: ICD-10-CM

## 2022-03-26 DIAGNOSIS — R10.32 LLQ ABDOMINAL PAIN: Primary | ICD-10-CM

## 2022-03-31 ENCOUNTER — TELEPHONE (OUTPATIENT)
Dept: BEHAVIORAL HEALTH | Facility: CLINIC | Age: 62
End: 2022-03-31
Payer: COMMERCIAL

## 2022-03-31 NOTE — TELEPHONE ENCOUNTER
Called patient to check in on mood per request of PCP. Spoke briefly and scheduled [facility] appointment.

## 2022-04-05 ENCOUNTER — TELEPHONE (OUTPATIENT)
Dept: FAMILY MEDICINE | Facility: CLINIC | Age: 62
End: 2022-04-05
Payer: COMMERCIAL

## 2022-04-05 DIAGNOSIS — N39.498 OTHER URINARY INCONTINENCE: Primary | ICD-10-CM

## 2022-04-05 NOTE — TELEPHONE ENCOUNTER
TCs,    Please print and fax to [facility].     Thanks,  DAVIN Young  [facility]

## 2022-04-05 NOTE — TELEPHONE ENCOUNTER
Needs DME Incontinence briefs 2XL sent to [facility] medical.   Call DAVIN Neves with any other questions

## 2022-04-08 ENCOUNTER — OFFICE VISIT (OUTPATIENT)
Dept: UROLOGY | Facility: CLINIC | Age: 62
End: 2022-04-08
Attending: NURSE PRACTITIONER
Payer: COMMERCIAL

## 2022-04-08 VITALS — BODY MASS INDEX: 47.12 KG/M2 | WEIGHT: 240 LBS | HEIGHT: 60 IN

## 2022-04-08 DIAGNOSIS — R30.0 DYSURIA: ICD-10-CM

## 2022-04-08 DIAGNOSIS — R35.0 URINARY FREQUENCY: Primary | ICD-10-CM

## 2022-04-08 LAB
ALBUMIN UR-MCNC: NEGATIVE MG/DL
APPEARANCE UR: CLEAR
BILIRUB UR QL STRIP: NEGATIVE
COLOR UR AUTO: YELLOW
GLUCOSE UR STRIP-MCNC: NEGATIVE MG/DL
HGB UR QL STRIP: NEGATIVE
KETONES UR STRIP-MCNC: NEGATIVE MG/DL
LEUKOCYTE ESTERASE UR QL STRIP: ABNORMAL
NITRATE UR QL: NEGATIVE
PH UR STRIP: 6 [PH] (ref 5–7)
RESIDUAL VOLUME (RV) (EXTERNAL): 6
SP GR UR STRIP: 1.02 (ref 1–1.03)
UROBILINOGEN UR STRIP-ACNC: 0.2 E.U./DL

## 2022-04-08 PROCEDURE — 81003 URINALYSIS AUTO W/O SCOPE: CPT | Mod: QW | Performed by: PHYSICIAN ASSISTANT

## 2022-04-08 PROCEDURE — 99203 OFFICE O/P NEW LOW 30 MIN: CPT | Mod: 25 | Performed by: PHYSICIAN ASSISTANT

## 2022-04-08 PROCEDURE — 51798 US URINE CAPACITY MEASURE: CPT | Performed by: PHYSICIAN ASSISTANT

## 2022-04-08 RX ORDER — MULTIVIT WITH MINERALS/LUTEIN
1000 TABLET ORAL 2 TIMES DAILY
Qty: 180 TABLET | Refills: 0 | Status: SHIPPED | OUTPATIENT
Start: 2022-04-08 | End: 2022-04-12

## 2022-04-08 RX ORDER — MIRABEGRON 25 MG/1
25 TABLET, FILM COATED, EXTENDED RELEASE ORAL DAILY
Qty: 90 TABLET | Refills: 3 | Status: SHIPPED | OUTPATIENT
Start: 2022-04-08 | End: 2022-05-09

## 2022-04-08 RX ORDER — MIRABEGRON 25 MG/1
25 TABLET, FILM COATED, EXTENDED RELEASE ORAL DAILY
Qty: 90 TABLET | Refills: 3 | Status: SHIPPED | OUTPATIENT
Start: 2022-04-08 | End: 2023-02-22

## 2022-04-08 ASSESSMENT — PAIN SCALES - GENERAL: PAINLEVEL: NO PAIN (0)

## 2022-04-08 NOTE — PROGRESS NOTES
"CC: dysuria, leakage    HPI:  Nena \"Ania\" Kitty is a pleasant 62 year old female who presents in consultation from Rowena Haas CNP for evaluation of the above. Dysuria, intermittent and ongoing for several months. Last UAs neg. Wears depends. Leakage for several years.  Has urgency and nocturia. No gross hematuria.     Here with her staff member, Heena.     Past Medical History:   Diagnosis Date     Acute respiratory failure with hypoxia (H) 9/4/2017     CAD (coronary artery disease)     5/2014 cath, nonbostructive stenosis to LAD, RCA.     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/2011     Noncompliance with medication regimen 6/8/2011     Obesity      CINDY (obstructive sleep apnea) 3/8/2012    uses CPAP     CINDY (obstructive sleep apnea)- mild AHI 10.3      Osteopenia 10/7/2009     Pneumonia of right lower lobe due to infectious organism 9/4/2017     Schizoaffective disorder, depressive type (H) 2/25/2013     Sepsis (H) 8/29/2017     Suicidal intent 10/2/2013     Takotsubo cardiomyopathy      Type 2 diabetes mellitus (H) 8/30/2011     Uterine cancer (H) 1983     Verbal auditory hallucination 10/4/2012       Past Surgical History:   Procedure Laterality Date     CATARACT IOL, RT/LT Bilateral 2017     CHOLECYSTECTOMY       COLONOSCOPY N/A 3/16/2017    Procedure: COLONOSCOPY;  Surgeon: Traci Gonzalez MD;  Location: [facility]     Coronary CTA  5/21/2014     HYSTERECTOMY  1983    uterine cancer yearly pap's per provider.     HYSTERECTOMY       LAPAROSCOPIC CHOLECYSTECTOMY  2008     PHACOEMULSIFICATION CLEAR CORNEA WITH STANDARD INTRAOCULAR LENS IMPLANT Left 5/5/2017    Procedure: " PHACOEMULSIFICATION CLEAR CORNEA WITH STANDARD INTRAOCULAR LENS IMPLANT;  LEFT EYE PHACOEMULSIFICATION CLEAR CORNEA WITH STANDARD INTRAOCULAR LENS IMPLANT ;  Surgeon: Tyra Diaz MD;  Location:  [facility]     PHACOEMULSIFICATION CLEAR CORNEA WITH STANDARD INTRAOCULAR LENS IMPLANT Right 6/30/2017    Procedure: PHACOEMULSIFICATION CLEAR CORNEA WITH STANDARD INTRAOCULAR LENS IMPLANT;  RIGHT EYE PHACOEMULSIFICATION CLEAR CORNEA WITH STANDARD INTRAOCULAR LENS IMPLANT;  Surgeon: Tyra Diaz MD;  Location:  [facility]     RELEASE TRIGGER FINGER  10/11/2012    Left thumb. Procedure: RELEASE TRIGGER FINGER;  LEFT THUMB TRIGGER RELEASE;  Surgeon: Tay Langley MD;  Location:  [facility]     RELEASE TRIGGER FINGER Right 12/26/2016    Procedure: RELEASE TRIGGER FINGER;  Surgeon: Albino Castañeda MD;  Location: [facility] OOPHORECTORMY FOR RAHEL, W/BX  1983    UTERINE       Social History     Socioeconomic History     Marital status:      Spouse name: Not on file     Number of children: Not on file     Years of education: Not on file     Highest education level: Not on file   Occupational History     Not on file   Tobacco Use     Smoking status: Never Smoker     Smokeless tobacco: Never Used   Substance and Sexual Activity     Alcohol use: No     Comment: last month     Drug use: No     Comment: history of     Sexual activity: Never     Partners: Male     Birth control/protection: None, Condom   Other Topics Concern     Parent/sibling w/ CABG, MI or angioplasty before 65F 55M? Not Asked      Service No     Blood Transfusions No     Caffeine Concern No     Occupational Exposure No     Hobby Hazards No     Sleep Concern Yes     Stress Concern Yes     Weight Concern Yes     Special Diet Yes     Comment: DM     Back Care Yes     Exercise Yes     Comment: WALKS DAILY     Bike Helmet Not Asked     Seat Belt Yes     Self-Exams No     Comment: ENCOURAGED   Social History Narrative     10/2014. Has 2 sons. 7  grandchildren.         Unemployed. Graduated HS.         Tobacco use: Denies    Alcohol use: Escalated use since   10/2014    Drug: Denies     Social Determinants of Health     Financial Resource Strain: Not on file   Food Insecurity: Not on file   Transportation Needs: Not on file   Physical Activity: Not on file   Stress: Not on file   Social Connections: Not on file   Intimate Partner Violence: Not on file   Housing Stability: Not on file       Family History   Problem Relation Age of Onset     Cancer Mother         BLADDER     Respiratory Mother         COPD     Gastrointestinal Disease Mother         CIRRHOSIS OF LI BOLIVAR     Alcohol/Drug Mother      Diabetes Mother      Hypertension Mother      Lipids Mother      C.A.D. Mother      Glaucoma Mother      Alcohol/Drug Sister      Mental Illness Sister      Alcohol/Drug Sister      Psychotic Disorder Sister      Cancer Maternal Grandmother         UNKNOWN TYPE     Cancer Brother         COLON     Cancer - colorectal Brother         IN HIS LATE 30S     Alcohol/Drug Brother          OF HEROIN OVERDOSE AT AGE 22 YRS     Macular Degeneration No family hx of        Allergies   Allergen Reactions     Imidazole Antifungals Hives     Tolerates diflucan     Ketoprofen Itching     Pruritis to topical     Lisinopril Hives     Metformin Other (See Comments)     Patient hospitalized for lactic acidosis - admitting provider suspectd caused by metformin     Metronidazole Hives     Posaconazole Hives     Tolerates diflucan       Current Outpatient Medications   Medication     acetaminophen (TYLENOL) 325 MG tablet     alcohol swab prep pads     amantadine (SYMMETREL) 100 MG capsule     aspirin (ASA) 81 MG EC tablet     atorvastatin (LIPITOR) 80 MG tablet     benzonatate (TESSALON) 100 MG capsule     blood glucose (NO BRAND SPECIFIED) test strip     clonazePAM (KLONOPIN) 0.5 MG tablet     Continuous Blood Gluc Sensor (DEXCOM G6 SENSOR) MISC     Continuous Blood Gluc  Transmit (DEXCOM G6 TRANSMITTER) MISC     diclofenac (VOLTAREN) 1 % topical gel     escitalopram (LEXAPRO) 10 MG tablet     famotidine (PEPCID) 20 MG tablet     gabapentin (NEURONTIN) 300 MG capsule     hydrOXYzine (VISTARIL) 25 MG capsule     insulin aspart (NOVOLOG FLEXPEN) 100 UNIT/ML pen     insulin aspart (NOVOLOG VIAL) 100 UNITS/ML vial     insulin glargine (LANTUS PEN) 100 UNIT/ML pen     insulin pen needle (NOVOFINE 30) 30G X 8 MM miscellaneous     losartan (COZAAR) 100 MG tablet     metoprolol succinate ER (TOPROL-XL) 50 MG 24 hr tablet     nystatin (MYCOSTATIN) 486143 UNIT/GM external powder     order for DME     order for DME     paliperidone ER (INVEGA) 9 MG 24 hr tablet     pantoprazole (PROTONIX) 40 MG EC tablet     QUEtiapine (SEROQUEL) 25 MG tablet     senna-docusate (SENOKOT-S/PERICOLACE) 8.6-50 MG tablet     thin (NO BRAND SPECIFIED) lancets     topiramate (TOPAMAX) 200 MG tablet     traZODone (DESYREL) 100 MG tablet     TRULICITY 1.5 MG/0.5ML pen     zinc oxide (DESITIN) 20 % external ointment     albuterol (PROAIR HFA/PROVENTIL HFA/VENTOLIN HFA) 108 (90 Base) MCG/ACT inhaler     naproxen (NAPROSYN) 375 MG tablet     No current facility-administered medications for this visit.         PEx:   Ht 1.524 m (5')   Wt 108.9 kg (240 lb)   LMP 01/06/2015 (Exact Date)   BMI 46.87 kg/m      PSYCH: NAD  EYES: EOMI  MOUTH: MMM  NEURO: AAO x3    Urine: trace leuk est      A/P: Nena D Kitty is a 62 year old female with urge incontinence, dysuria  -UA/UC is symptoms  -Estrogen cream three times a week near urethra (pea-sized amount)recommended, but she declines.   -Myrbetriq 25 mg daily.   -3mo virtual med check    BRANDON Ocampo [facility]        26 minutes spent on the date of the encounter doing chart review, review of outside records, review of test results, interpretation of tests, patient visit and documentation

## 2022-04-08 NOTE — PATIENT INSTRUCTIONS
Vitamin C 1,000mg twice a day  ____________________________________________________  You have been prescribed medication to help relax your bladder (Myrbetriq 25mg daily).    This medication may help control (or improve) urinary frequency, urgency and urge incontinence.    Common side effects include:  Dry mouth (Biotene mouthwash can help with this.)  Constipation  Drowsiness  Blurred vision    Rare side effect:  Urinary retention

## 2022-04-08 NOTE — LETTER
"4/8/2022       RE: Nena Tang  [address] [place] [address]     Dear Colleague,    Thank you for referring your patient, Nena Tang, to the [facility] at [facility]. Please see a copy of my visit note below.    CC: dysuria, leakage    HPI:  Nena \"Ania\" Kitty is a pleasant 62 year old female who presents in consultation from Rowena Haas CNP for evaluation of the above. Dysuria, intermittent and ongoing for several months. Last UAs neg. Wears depends. Leakage for several years.  Has urgency and nocturia. No gross hematuria.     Here with her staff member, Heena.     Past Medical History:   Diagnosis Date     Acute respiratory failure with hypoxia (H) 9/4/2017     CAD (coronary artery disease)     5/2014 cath, nonbostructive stenosis to LAD, RCA.     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/2011     Noncompliance with medication regimen 6/8/2011     Obesity      CINDY (obstructive sleep apnea) 3/8/2012    uses CPAP     CINDY (obstructive sleep apnea)- mild AHI 10.3      Osteopenia 10/7/2009     Pneumonia of right lower lobe due to infectious organism 9/4/2017     Schizoaffective disorder, depressive type (H) 2/25/2013     Sepsis (H) 8/29/2017     Suicidal intent 10/2/2013     Takotsubo cardiomyopathy      Type 2 diabetes mellitus (H) 8/30/2011     Uterine cancer (H) 1983     Verbal auditory hallucination 10/4/2012       Past Surgical History:   Procedure Laterality Date     CATARACT IOL, RT/LT Bilateral 2017     CHOLECYSTECTOMY       COLONOSCOPY N/A 3/16/2017    Procedure: COLONOSCOPY;  " Surgeon: Traci Gonzalez MD;  Location:  GI     Coronary CTA  5/21/2014     HYSTERECTOMY  1983    uterine cancer yearly pap's per provider.     HYSTERECTOMY       LAPAROSCOPIC CHOLECYSTECTOMY  2008     PHACOEMULSIFICATION CLEAR CORNEA WITH STANDARD INTRAOCULAR LENS IMPLANT Left 5/5/2017    Procedure: PHACOEMULSIFICATION CLEAR CORNEA WITH STANDARD INTRAOCULAR LENS IMPLANT;  LEFT EYE PHACOEMULSIFICATION CLEAR CORNEA WITH STANDARD INTRAOCULAR LENS IMPLANT ;  Surgeon: Tyra Diaz MD;  Location:  EC     PHACOEMULSIFICATION CLEAR CORNEA WITH STANDARD INTRAOCULAR LENS IMPLANT Right 6/30/2017    Procedure: PHACOEMULSIFICATION CLEAR CORNEA WITH STANDARD INTRAOCULAR LENS IMPLANT;  RIGHT EYE PHACOEMULSIFICATION CLEAR CORNEA WITH STANDARD INTRAOCULAR LENS IMPLANT;  Surgeon: Tyra Diaz MD;  Location:  EC     RELEASE TRIGGER FINGER  10/11/2012    Left thumb. Procedure: RELEASE TRIGGER FINGER;  LEFT THUMB TRIGGER RELEASE;  Surgeon: Tay Langley MD;  Location:  SD     RELEASE TRIGGER FINGER Right 12/26/2016    Procedure: RELEASE TRIGGER FINGER;  Surgeon: Albino Castañeda MD;  Location:  OR     [facility] OOPHORECTORMY FOR MALIG, W/BX  1983    UTERINE       Social History     Socioeconomic History     Marital status:      Spouse name: Not on file     Number of children: Not on file     Years of education: Not on file     Highest education level: Not on file   Occupational History     Not on file   Tobacco Use     Smoking status: Never Smoker     Smokeless tobacco: Never Used   Substance and Sexual Activity     Alcohol use: No     Comment: last month     Drug use: No     Comment: history of     Sexual activity: Never     Partners: Male     Birth control/protection: None, Condom   Other Topics Concern     Parent/sibling w/ CABG, MI or angioplasty before 65F 55M? Not Asked      Service No     Blood Transfusions No     Caffeine Concern No     Occupational Exposure No     Hobby  Hazards No     Sleep Concern Yes     Stress Concern Yes     Weight Concern Yes     Special Diet Yes     Comment: DM     Back Care Yes     Exercise Yes     Comment: WALKS DAILY     Bike Helmet Not Asked     Seat Belt Yes     Self-Exams No     Comment: ENCOURAGED   Social History Narrative     10/2014. Has 2 sons. 7 grandchildren.         Unemployed. Graduated HS.         Tobacco use: Denies    Alcohol use: Escalated use since   10/2014    Drug: Denies     Social Determinants of Health     Financial Resource Strain: Not on file   Food Insecurity: Not on file   Transportation Needs: Not on file   Physical Activity: Not on file   Stress: Not on file   Social Connections: Not on file   Intimate Partner Violence: Not on file   Housing Stability: Not on file       Family History   Problem Relation Age of Onset     Cancer Mother         BLADDER     Respiratory Mother         COPD     Gastrointestinal Disease Mother         CIRRHOSIS OF LI BOLIVAR     Alcohol/Drug Mother      Diabetes Mother      Hypertension Mother      Lipids Mother      C.A.D. Mother      Glaucoma Mother      Alcohol/Drug Sister      Mental Illness Sister      Alcohol/Drug Sister      Psychotic Disorder Sister      Cancer Maternal Grandmother         UNKNOWN TYPE     Cancer Brother         COLON     Cancer - colorectal Brother         IN HIS LATE 30S     Alcohol/Drug Brother          OF HEROIN OVERDOSE AT AGE 22 YRS     Macular Degeneration No family hx of        Allergies   Allergen Reactions     Imidazole Antifungals Hives     Tolerates diflucan     Ketoprofen Itching     Pruritis to topical     Lisinopril Hives     Metformin Other (See Comments)     Patient hospitalized for lactic acidosis - admitting provider suspectd caused by metformin     Metronidazole Hives     Posaconazole Hives     Tolerates diflucan       Current Outpatient Medications   Medication     acetaminophen (TYLENOL) 325 MG tablet     alcohol swab prep pads      amantadine (SYMMETREL) 100 MG capsule     aspirin (ASA) 81 MG EC tablet     atorvastatin (LIPITOR) 80 MG tablet     benzonatate (TESSALON) 100 MG capsule     blood glucose (NO BRAND SPECIFIED) test strip     clonazePAM (KLONOPIN) 0.5 MG tablet     Continuous Blood Gluc Sensor (DEXCOM G6 SENSOR) MISC     Continuous Blood Gluc Transmit (DEXCOM G6 TRANSMITTER) MISC     diclofenac (VOLTAREN) 1 % topical gel     escitalopram (LEXAPRO) 10 MG tablet     famotidine (PEPCID) 20 MG tablet     gabapentin (NEURONTIN) 300 MG capsule     hydrOXYzine (VISTARIL) 25 MG capsule     insulin aspart (NOVOLOG FLEXPEN) 100 UNIT/ML pen     insulin aspart (NOVOLOG VIAL) 100 UNITS/ML vial     insulin glargine (LANTUS PEN) 100 UNIT/ML pen     insulin pen needle (NOVOFINE 30) 30G X 8 MM miscellaneous     losartan (COZAAR) 100 MG tablet     metoprolol succinate ER (TOPROL-XL) 50 MG 24 hr tablet     nystatin (MYCOSTATIN) 849830 UNIT/GM external powder     order for DME     order for DME     paliperidone ER (INVEGA) 9 MG 24 hr tablet     pantoprazole (PROTONIX) 40 MG EC tablet     QUEtiapine (SEROQUEL) 25 MG tablet     senna-docusate (SENOKOT-S/PERICOLACE) 8.6-50 MG tablet     thin (NO BRAND SPECIFIED) lancets     topiramate (TOPAMAX) 200 MG tablet     traZODone (DESYREL) 100 MG tablet     TRULICITY 1.5 MG/0.5ML pen     zinc oxide (DESITIN) 20 % external ointment     albuterol (PROAIR HFA/PROVENTIL HFA/VENTOLIN HFA) 108 (90 Base) MCG/ACT inhaler     naproxen (NAPROSYN) 375 MG tablet     No current facility-administered medications for this visit.         PEx:   Ht 1.524 m (5')   Wt 108.9 kg (240 lb)   LMP 01/06/2015 (Exact Date)   BMI 46.87 kg/m      PSYCH: NAD  EYES: EOMI  MOUTH: MMM  NEURO: AAO x3    Urine: trace leuk est      A/P: Nena TINY Kitty is a 62 year old female with urge incontinence, dysuria  -UA/UC is symptoms  -Estrogen cream three times a week near urethra (pea-sized amount)recommended, but she declines.   -Myrbetriq 25 mg  daily.   -3mo virtual med check    Gisselle Nash PA-C  [facility]        26 minutes spent on the date of the encounter doing chart review, review of outside records, review of test results, interpretation of tests, patient visit and documentation

## 2022-04-08 NOTE — NURSING NOTE
Chief Complaint   Patient presents with     UTI     Patient here today for discuss her UTI        Height 1.524 m (5'), weight 108.9 kg (240 lb), last menstrual period 01/06/2015, not currently breastfeeding. Body mass index is 46.87 kg/m .    Patient Active Problem List   Diagnosis     Osteopenia     Vitamin B12 deficiency without anemia     Hyperlipidemia LDL goal <100     Rotator cuff syndrome     Type 2 diabetes mellitus with mild nonproliferative retinopathy (H)     Illiterate     Irritable bowel syndrome     overweight - BMI >35     Takotsubo cardiomyopathy     CAD (coronary artery disease)     Restless legs syndrome (RLS)     CINDY (obstructive sleep apnea)- mild AHI 10.3     Verbal auditory hallucination     Chronic low back pain     Schizoaffective disorder, depressive type (H)     Migraine headache     HTN, goal below 140/90     Health Care Home     Lumbago     Cervicalgia     Cocaine abuse, episodic (H)     Suicidal ideation     Esophageal reflux     Mild nonproliferative diabetic retinopathy (H)     Tardive dyskinesia     Alcohol use     Left cataract     Falls frequently     History of uterine cancer     Psychophysiological insomnia     Dysuria     Asymptomatic postmenopausal status     Abdominal pain, right lower quadrant     Infectious encephalopathy     Non-intractable vomiting with nausea     Thoughts of self harm     Gastroenteritis     Posttraumatic stress disorder     Cervical cancer screening     Type 2 diabetes mellitus with stage 3 chronic kidney disease, with long-term current use of insulin (H)     Positive AIDA (antinuclear antibody)     Hyperglycemia     Pneumonia     Depression with suicidal ideation     Mixed stress and urge urinary incontinence     Chronic pain syndrome     Fibromyalgia     Type 2 diabetes mellitus without complication, with long-term current use of insulin (H)     Dehydration     Hypoglycemia     Acute kidney injury (H)     Diarrhea, unspecified type     2019 novel  coronavirus disease (COVID-19)       Allergies   Allergen Reactions     Imidazole Antifungals Hives     Tolerates diflucan     Ketoprofen Itching     Pruritis to topical     Lisinopril Hives     Metformin Other (See Comments)     Patient hospitalized for lactic acidosis - admitting provider suspectd caused by metformin     Metronidazole Hives     Posaconazole Hives     Tolerates diflucan       Current Outpatient Medications   Medication Sig Dispense Refill     acetaminophen (TYLENOL) 325 MG tablet Take 650mg at bedtime scheduled and additional 650mg every 6 hours as needed, max 3000mg/day 200 tablet 3     alcohol swab prep pads Use to swab area of injection/rodolfo as directed. 100 each 3     amantadine (SYMMETREL) 100 MG capsule TAKE 1 CAPSULE BY MOUTH EVERY MORNING AND TAKE 2 CAPSULES BY MOUTH EVERY EVENING 84 capsule 3     aspirin (ASA) 81 MG EC tablet TAKE 1 TABLET (81MG) BY MOUTH DAILY 90 tablet 1     atorvastatin (LIPITOR) 80 MG tablet TAKE 1 TABLET (80MG) BY MOUTH DAILY 30 tablet 11     benzonatate (TESSALON) 100 MG capsule Take 1 capsule (100 mg) by mouth 3 times daily as needed for cough 90 capsule 1     blood glucose (NO BRAND SPECIFIED) test strip Use to test blood sugar 10 times daily or as directed. 100 strip 11     clonazePAM (KLONOPIN) 0.5 MG tablet Take 1 tablet (0.5 mg) by mouth At Bedtime 30 tablet 3     Continuous Blood Gluc Sensor (DEXCOM G6 SENSOR) MISC Change every 10 days. 3 each 11     Continuous Blood Gluc Transmit (DEXCOM G6 TRANSMITTER) MISC Change every 3 months. 1 each 3     diclofenac (VOLTAREN) 1 % topical gel Apply 4 grams to painful area at bedtime. May use an additional 3 doses during the day as needed 100 g 1     escitalopram (LEXAPRO) 10 MG tablet Take 1 tablet (10 mg) by mouth daily 30 tablet 3     famotidine (PEPCID) 20 MG tablet Take 1 tablet (20 mg) by mouth 2 times daily as needed (GERD) 90 tablet 1     gabapentin (NEURONTIN) 300 MG capsule Take 1 capsule (300 mg) by mouth At  Bedtime 30 capsule 3     hydrOXYzine (VISTARIL) 25 MG capsule Take 1 capsule (25 mg) by mouth every 4 hours as needed for anxiety May take nightly for sleep 60 capsule 3     insulin aspart (NOVOLOG FLEXPEN) 100 UNIT/ML pen Inject 6 Units Subcutaneous 3 times daily (with meals) Hold for glucose less than 70. With dinner only add 2 units for every 50 mg/dl blood sugar over 140 with max 16 units. 30 mL 1     insulin aspart (NOVOLOG VIAL) 100 UNITS/ML vial Inject 6-16 Units Subcutaneous       insulin glargine (LANTUS PEN) 100 UNIT/ML pen Inject 26 Units Subcutaneous 2 times daily 60 mL 1     insulin pen needle (NOVOFINE 30) 30G X 8 MM miscellaneous USE 4 DAILY OR AS DIRECTED 400 each 1     losartan (COZAAR) 100 MG tablet Take 1 tablet (100 mg) by mouth daily 30 tablet 3     metoprolol succinate ER (TOPROL-XL) 50 MG 24 hr tablet Take 50mg in the morning and 100mg at night 120 tablet 3     mirabegron (MYRBETRIQ) 25 MG 24 hr tablet Take 1 tablet (25 mg) by mouth daily 90 tablet 3     mirabegron (MYRBETRIQ) 25 MG 24 hr tablet Take 1 tablet (25 mg) by mouth daily 90 tablet 3     nystatin (MYCOSTATIN) 953673 UNIT/GM external powder Apply topically 2 times daily as needed 45 g 1     order for DME Equipment being ordered: Depends. 30 each 4     order for DME Equipment being ordered: CPAP Supplies. 1 each 0     paliperidone ER (INVEGA) 9 MG 24 hr tablet Take 1 tablet (9 mg) by mouth At Bedtime 30 tablet 3     pantoprazole (PROTONIX) 40 MG EC tablet Take 1 tablet (40 mg) by mouth daily 90 tablet 1     QUEtiapine (SEROQUEL) 25 MG tablet Take 1 tablet (25 mg) by mouth At Bedtime 30 tablet 3     senna-docusate (SENOKOT-S/PERICOLACE) 8.6-50 MG tablet Take 1 tablet daily and take an extra tablet with constipation. 60 tablet 10     thin (NO BRAND SPECIFIED) lancets Use with lanceting device. To accompany: Blood Glucose Monitor Brands: per insurance. 100 each 6     topiramate (TOPAMAX) 200 MG tablet Take 1 tablet (200 mg) by mouth daily  30 tablet 3     traZODone (DESYREL) 100 MG tablet Take 1 tablet (100 mg) by mouth At Bedtime 90 tablet 1     TRULICITY 1.5 MG/0.5ML pen Inject 1.5 mg Subcutaneous once a week 6 mL 3     vitamin C (ASCORBIC ACID) 1000 MG TABS Take 1 tablet (1,000 mg) by mouth 2 times daily 180 tablet 0     zinc oxide (DESITIN) 20 % external ointment Apply topically 3 times daily as needed for irritation (barrier cream) 60 g 3     albuterol (PROAIR HFA/PROVENTIL HFA/VENTOLIN HFA) 108 (90 Base) MCG/ACT inhaler Inhale 2 puffs into the lungs 4 times daily as needed for shortness of breath / dyspnea 8 g 0     naproxen (NAPROSYN) 375 MG tablet Take 1 tablet (375 mg) by mouth 2 times daily as needed for moderate pain 60 tablet 1       Social History     Tobacco Use     Smoking status: Never Smoker     Smokeless tobacco: Never Used   Substance Use Topics     Alcohol use: No     Comment: last month     Drug use: No     Comment: history of         UA RESULTS:  Recent Labs   Lab Test 04/08/22  1548 03/24/22  1211   COLOR Yellow Yellow   APPEARANCE Clear Clear   URINEGLC Negative Negative   URINEBILI Negative Negative   URINEKETONE Negative Negative   SG 1.020 1.020   UBLD Negative Negative   URINEPH 6.0 7.0   PROTEIN Negative Negative   UROBILINOGEN 0.2 0.2   NITRITE Negative Negative   LEUKEST Trace* Negative   RBCU  --  0-2   WBCU  --  0-5       pvr 6ml      [facility]  4/8/2022  3:53 PM

## 2022-04-11 DIAGNOSIS — F25.0 SCHIZOAFFECTIVE DISORDER, BIPOLAR TYPE (H): ICD-10-CM

## 2022-04-11 DIAGNOSIS — Z79.4 TYPE 2 DIABETES MELLITUS WITH MILD NONPROLIFERATIVE RETINOPATHY WITHOUT MACULAR EDEMA, WITH LONG-TERM CURRENT USE OF INSULIN, UNSPECIFIED LATERALITY (H): ICD-10-CM

## 2022-04-11 DIAGNOSIS — E11.3299 TYPE 2 DIABETES MELLITUS WITH MILD NONPROLIFERATIVE RETINOPATHY WITHOUT MACULAR EDEMA, WITH LONG-TERM CURRENT USE OF INSULIN, UNSPECIFIED LATERALITY (H): ICD-10-CM

## 2022-04-11 NOTE — TELEPHONE ENCOUNTER
Requested Prescriptions   Pending Prescriptions Disp Refills     clonazePAM (KLONOPIN) 0.5 MG tablet [Pharmacy Med Name: clonazePAM 0.5MG TABS*] 28 tablet      Sig: TAKE 1 TABLET BY MOUTH AT BEDTIME       There is no refill protocol information for this order        Alcohol Swabs (EASY TOUCH ALCOHOL PREP MEDIUM) 70 % PADS [Pharmacy Med Name: Easy Touch Alcohol Prep Medium 70% PADS]       Sig: USE TO SWAB AREA OF INJECTION/HOLLIS AS DIRECTED.       There is no refill protocol information for this order        Routing refill request to provider for review/approval because:  Drug not on the [facility] refill protocol     Hannah Vu RN  [facility]

## 2022-04-12 ENCOUNTER — TELEPHONE (OUTPATIENT)
Dept: UROLOGY | Facility: CLINIC | Age: 62
End: 2022-04-12
Payer: COMMERCIAL

## 2022-04-12 DIAGNOSIS — R30.0 DYSURIA: ICD-10-CM

## 2022-04-12 RX ORDER — CLONAZEPAM 0.5 MG/1
TABLET ORAL
Qty: 28 TABLET | Refills: 0 | Status: SHIPPED | OUTPATIENT
Start: 2022-04-12 | End: 2022-05-09

## 2022-04-12 RX ORDER — MULTIVIT WITH MINERALS/LUTEIN
1000 TABLET ORAL 2 TIMES DAILY
Qty: 180 TABLET | Refills: 0 | Status: SHIPPED | OUTPATIENT
Start: 2022-04-12 | End: 2022-06-29

## 2022-04-12 RX ORDER — ISOPROPYL ALCOHOL 70 ML/100ML
SWAB TOPICAL
Qty: 100 EACH | Refills: 4 | Status: SHIPPED | OUTPATIENT
Start: 2022-04-12 | End: 2022-08-17

## 2022-04-12 NOTE — TELEPHONE ENCOUNTER
[facility] Call Center    Phone Message    May a detailed message be left on voicemail: yes     Reason for Call: Medication Question or concern regarding medication   Prescription Clarification  Name of Medication: vitamin C (ASCORBIC ACID) 1000 MG TABS  Prescribing Provider: Saad   Pharmacy: [id number] [id number]     What on the order needs clarification? Connor from [facility] is calling to ask for Saad's team to resend over the vitamin C order as they do not see it in the pharmacy and would like to fill it for patient.  Please resend order.  Thank you.          Action Taken: Other: Urology    Travel Screening: Not Applicable

## 2022-04-15 ENCOUNTER — VIRTUAL VISIT (OUTPATIENT)
Dept: BEHAVIORAL HEALTH | Facility: CLINIC | Age: 62
End: 2022-04-15
Payer: COMMERCIAL

## 2022-04-15 DIAGNOSIS — F25.1 SCHIZOAFFECTIVE DISORDER, DEPRESSIVE TYPE (H): Primary | ICD-10-CM

## 2022-04-15 PROCEDURE — 90832 PSYTX W PT 30 MINUTES: CPT | Mod: 95

## 2022-04-15 NOTE — PROGRESS NOTES
"[facility]  April 15, 2022    Behavioral Health Clinician Progress Note    Patient Name: Nena Tang           Service Type:  Individual      Service Location:   Phone call (patient / identified key support person reached)     Session Start Time: 3:03 PM   Session End Time: 3:27 PM      Session Length: 16 - 37      Attendees: Patient     Service Modality:  Phone Visit:      Provider verified identity through the following two step process.  Patient provided:  Patient is known previously to provider    The patient has been notified of the following:      \"We have found that certain health care needs can be provided without the need for a face to face visit.  This service lets us provide the care you need with a phone conversation.       I will have full access to your [facility] medical record during this entire phone call.   I will be taking notes for your medical record.      Since this is like an office visit, we will bill your insurance company for this service.       There are potential benefits and risks of telephone visits (e.g. limits to patient confidentiality) that differ from in-person visits.?Confidentiality still applies for telephone services, and nobody will record the visit.  It is important to be in a quiet, private space that is free of distractions (including cell phone or other devices) during the visit.??      If during the course of the call I believe a telephone visit is not appropriate, you will not be charged for this service\"     Consent has been obtained for this service by care team member: Yes     Visit Activities (Refresh list every visit): [facility] Only and Phone Encounter    Diagnostic Assessment Date: New diagnostic assessment/psychiatric evaluation to be completed when patient establishes with [facility] on 4/29/22. Most recent diagnostic assessment was updated 12/12/2019  Treatment Plan Review Date: Not yet created with this [facility]  See Flowsheets for " today's PHQ-9 and TAMIA-7 results  Previous PHQ-9:   PHQ-9 SCORE 5/7/2019 12/1/2020 11/15/2021   PHQ-9 Total Score - - -   PHQ-9 Total Score - - -   PHQ-9 Total Score 22 13 0     Previous TAMIA-7:   TAMIA-7 SCORE 3/13/2018 10/26/2018 4/29/2022   Total Score - - -   Total Score 17 19 11   Total Score - - -       ALEXANDRA LEVEL:  ALEXANDRA Score (Last Two) 8/30/2011 6/9/2014   ALEXANDRA Raw Score 42 37   Activation Score 66 49.9   ALEXANDRA Level 3 2       DATA  Extended Session (60+ minutes): No  Interactive Complexity: No  Crisis: No  [facility] Patient: No    Treatment Objective(s) Addressed in This Session:  Target Behavior(s): disease management/lifestyle changes - depression    Depressed Mood: Decrease frequency and intensity of feeling down, depressed, hopeless  Adjustment Difficulties: will develop coping/problem-solving skills to facilitate more adaptive adjustment    Current Stressors / Issues:  [facility] phone visit with patient per request of patient's PCP, who recently informed patient of her resignation from this clinic. Patient expressed feeling low mood along with sadness about her provider's departure. [facility] validated this sense of loss and discussed with patient how she is coping. She stated that she is trying to keep herself busy. [facility] encouraged patient to schedule a follow-up appointment after she establishes care with psychiatry later this month, if mood does not improve.  She denied safety concerns, SI, and SIB.     Progress on Treatment Objective(s) / Homework:  No improvement - PREPARATION (Decided to change - considering how); Intervened by negotiating a change plan and determining options / strategies for behavior change, identifying triggers, exploring social supports, and working towards setting a date to begin behavior change    Motivational Interviewing    MI Intervention: Expressed Empathy/Understanding, Permission to raise concern or advise, Open-ended questions, Reflections: simple and complex and Change talk (evoked)     Change  Talk Expressed by the Patient: Desire to change Activation Taking steps    Provider Response to Change Talk: E - Evoked more info from patient about behavior change, A - Affirmed patient's thoughts, decisions, or attempts at behavior change, R - Reflected patient's change talk and S - Summarized patient's change talk statements    Also provided psychoeducation about behavioral health condition, symptoms, and treatment options    Care Plan review completed: No    Medication Review:  No changes to current psychiatric medication(s)    Medication Compliance:  Yes    Changes in Health Issues:   None reported    Chemical Use Review:   Substance Use: Chemical use reviewed, no active concerns identified      Tobacco Use: No current tobacco use.      Assessment: Current Emotional / Mental Status (status of significant symptoms):  Patient has had a history of suicidal ideation: pt reports hx of SI, severe episodes following the death of her , mother, and sister and suicide attempts: 3 attempted overdose on insulin   Patient denies current fears or concerns for personal safety.  Patient denies current or recent suicidal ideation or behaviors.  Patient denies current or recent homicidal ideation or behaviors.  Patient denies current or recent self injurious behavior or ideation.  Patient denies other safety concerns.  A safety and risk management plan has been developed including: Patient consented to co-developed safety plan.  A safety and risk management plan was completed.  Patient agreed to use safety plan should any safety concerns arise.  A copy was given to the patient.    Appearance:   Unable to assess   Eye Contact:   Unable to assess   Psychomotor Behavior: Unable to assess  Attitude:   Cooperative  Interested  Orientation:   All  Speech   Rate / Production: Normal/ Responsive Slow    Volume:  Soft   Mood:    Depressed  Sad   Affect:    Restricted   Thought Content:  Clear   Thought Form:  Coherent  Logical    Insight:    Good     Diagnoses:  1. Schizoaffective disorder, depressive type (H)    Per medical record - new diagnostic assessment to be completed    Collateral Reports Completed:  Communicated with: Patient's provider - ART Lobo CNP    Plan: (Homework, other):  New diagnostic assessment to be completed. [facility] and patient to create new treatment plan. Patient will be establishing with a new psychiatry provider and [facility] this month and will schedule follow-up visit with this [facility] as needed.  CD Recommendations: No indications of CD issues.     TWILA Barba, [facility]

## 2022-04-19 DIAGNOSIS — E11.3299 TYPE 2 DIABETES MELLITUS WITH MILD NONPROLIFERATIVE RETINOPATHY WITHOUT MACULAR EDEMA, WITH LONG-TERM CURRENT USE OF INSULIN, UNSPECIFIED LATERALITY (H): ICD-10-CM

## 2022-04-19 DIAGNOSIS — Z79.4 TYPE 2 DIABETES MELLITUS WITH MILD NONPROLIFERATIVE RETINOPATHY WITHOUT MACULAR EDEMA, WITH LONG-TERM CURRENT USE OF INSULIN, UNSPECIFIED LATERALITY (H): ICD-10-CM

## 2022-04-19 NOTE — TELEPHONE ENCOUNTER
Rowena,  Changed sig to 5 times daily per request below.  Please authorize if appropriate.  Thanks,  Norma Hathaway RN

## 2022-04-20 ENCOUNTER — TELEPHONE (OUTPATIENT)
Dept: PALLIATIVE MEDICINE | Facility: CLINIC | Age: 62
End: 2022-04-20

## 2022-04-20 ENCOUNTER — THERAPY VISIT (OUTPATIENT)
Dept: PHYSICAL THERAPY | Facility: CLINIC | Age: 62
End: 2022-04-20

## 2022-04-20 DIAGNOSIS — G89.29 CHRONIC PAIN: ICD-10-CM

## 2022-04-20 DIAGNOSIS — M79.7 FIBROMYALGIA: Primary | ICD-10-CM

## 2022-04-20 PROCEDURE — 97110 THERAPEUTIC EXERCISES: CPT | Mod: GP

## 2022-04-20 PROCEDURE — 97530 THERAPEUTIC ACTIVITIES: CPT | Mod: GP

## 2022-04-20 PROCEDURE — 97161 PT EVAL LOW COMPLEX 20 MIN: CPT | Mod: GP

## 2022-04-20 NOTE — PROGRESS NOTES
"Physical Therapy Initial Examination/Evaluation  April 20, 2022     Nena Tang is a 62 year old female referred to physical therapy by Rowena Haas APRN CNP for treatment of Chronic pain and Fibromyalgia  with Precautions/Restrictions/MD instructions Eval and treat.      Therapist Impression:   Patient has complaints of full body chronic pain including back, YAMEL knee R>L, and YAMEL UEs, reports knees are most concerning due to weakness, buckling and fall hx. Has multiple PMH factors, see below. Patient lives in a group home where she gets assistance with ADLs, attends group classes at a facility. Reports 3 falls in the last 6 months while walking, sometimes feels knees buckling, typically uses a 4WW outside of the home, did not bring with today. Patient found to have impaired sit <> stands, BLE weakness, tight HS, poor balance and decreased exercise tolerance. Patient given HEP with sit <> stands, HS stretch and standing marching.      Subjective:  DOI/onset: Order date: 03/24/22  Acute Injury or Gradual Onset?: Gradual injury over time  Mechanism of Injury: unknown  Related PMH: history of multiple falls while walking  Imaging: None  Chief Complaint/Functional Limitations: Pain and see below in therapy evaluation codes   Pain: rest 7 /10, activity 10/10 Location: back, YAMEL knees Frequency: Constant Described as: aching and sharp Previous Treatment: Exercises, hip injection, heating pad, pain medication Effect of prior treatment: fair Alleviated by: Heat and Movement Progression of Symptoms: Gradually getting better. Time of day when pain is worse: Night, Activity related and Position related  Sleeping: Interrupted due to current issue and Losing 2 hours of sleep per night   Occupation: Drop in group activities  Job duties: prolonged sitting  Current HEP/exercise regimen: Group classes, [employer]  Patient's goals are see chief complaints \"To get to walk better, not use the walking\"     Other pertinent PMH/Red " "Flags: Fibromyalgia, Schizoaffective disorder, DM 2, Gastro reflux, HTN, Headaches/Dizziness, broke rib last month, Cancer, Chemical dependency, Depression, Menopausal  Barriers at home/work: Lives in group home, gets assistance with cooking, bathing, cooking, requires transportation  Pertinent Surgical History: None  Medications: See Epic  General health as reported by patient: fair  Return to MD:  PRN      EVALUATION  Dynamic Movement Screen  NBOS balance : x20 seconds with increased posterior sway, reported dizziness during / post, \"The world was spinning\"   Gait: Decreased gait speed, shuffling steps, downward gaze  Sit <> stand: Requires multiple attempts to stand, fatigued at 2 reps      Range of Motion     Lumbar:   Flexion - to ankles with HS stretch  Extension - minimal      Exercise Tolerance  Poor     Strength  Hip Flexion - 4-/5 YAMEL     Palpation  No tenderness to YAMEL knee palpation      Assessment/Plan:  Patient is a 62 year old female with lumbar, both sides shoulder and both sides knee complaints.    Patient has the following significant findings with corresponding treatment plan.                Diagnosis 1:  Chronic pain  Pain -  hot/cold therapy, manual therapy and directional preference exercise  Decreased ROM/flexibility - manual therapy and therapeutic exercise  Decreased strength - therapeutic exercise and therapeutic activities  Impaired balance - neuro re-education and therapeutic activities  Impaired gait - gait training  Decreased function - therapeutic activities     Therapy Evaluation Codes:   1. History comprised of:              Personal factors that impact the plan of care:                            Overall behavior pattern and Past/current experiences.               Comorbidity factors that impact the plan of care are:                            Fibromyalgia, Schizoaffective disorder, DM 2, Gastro reflux, HTN, Headaches/Dizziness, broke rib last month, Cancer, Chemical dependency, " Wekness, Depression, Menopausal.                Medications impacting care: See Epic.  2. Examination of Body Systems comprised of:              Body structures and functions that impact the plan of care:                            Knee, Lumbar spine and Shoulder.              Activity limitations that impact the plan of care are:                            Bathing, Bending, Dressing, Lifting, Squatting/kneeling, Stairs, Standing, Walking, Sleeping and Laying down.  3. Clinical presentation characteristics are:              Evolving/Changing.  4. Decision-Making                          Moderate complexity using standardized patient assessment instrument and/or measureable assessment of functional outcome.  Cumulative Therapy Evaluation is: Moderate complexity.     Previous and current functional limitations:  (See Goal Flow Sheet for this information)    Short term and Long term goals: (See Goal Flow Sheet for this information)      Communication ability:  Patient appears to be able to clearly communicate and understand verbal and written communication and follow directions correctly.  Treatment Explanation - The following has been discussed with the patient:   RX ordered/plan of care  Anticipated outcomes  Possible risks and side effects  This patient would benefit from PT intervention to resume normal activities.   Rehab potential is good.     Frequency:  1 X week, once daily  Duration:  for 4 weeks tapering to 2 X a month for 2 months  Discharge Plan:  Achieve all LTG.  Independent in home treatment program.  Reach maximal therapeutic benefit.     Please refer to the daily flowsheet for treatment today, total treatment time and time spent performing 1:1 timed codes.

## 2022-04-20 NOTE — TELEPHONE ENCOUNTER
Chart review:2  Last visit with Dr. Paz: 12/14/21  Any changes to regimen at last visit:   No   On opioids:  No . On stable doses since:  na  Hx of injections:  Yes:  Facet Joint   Medical cannabis:  No   Other?  No   If applicable: if pt is on opioids and is transferring to another pain provider were they told that their dosing may change? NA  If transfer is w/in was pt told that appointment w/ new provider HAS to be in-person?   N/A      Plan:  Pt could have Facet joint injection in future, PCP can order, No medications ordered by Dr. Paz.  Pt should be in PT.

## 2022-04-26 NOTE — ED NOTES
Rounded on pt. Pt denies dizziness at this time and states that she feels much better.  No other needs at this time. Pt resting in bed and watching tv.   If you are a smoker, it is important for your health to stop smoking. Please be aware that second hand smoke is also harmful.

## 2022-04-29 ENCOUNTER — VIRTUAL VISIT (OUTPATIENT)
Dept: BEHAVIORAL HEALTH | Facility: CLINIC | Age: 62
End: 2022-04-29
Payer: COMMERCIAL

## 2022-04-29 ENCOUNTER — VIRTUAL VISIT (OUTPATIENT)
Dept: PSYCHIATRY | Facility: CLINIC | Age: 62
End: 2022-04-29
Attending: NURSE PRACTITIONER
Payer: COMMERCIAL

## 2022-04-29 ENCOUNTER — THERAPY VISIT (OUTPATIENT)
Dept: PHYSICAL THERAPY | Facility: CLINIC | Age: 62
End: 2022-04-29
Payer: COMMERCIAL

## 2022-04-29 DIAGNOSIS — F33.1 MODERATE EPISODE OF RECURRENT MAJOR DEPRESSIVE DISORDER (H): ICD-10-CM

## 2022-04-29 DIAGNOSIS — F25.1 SCHIZOAFFECTIVE DISORDER, DEPRESSIVE TYPE (H): ICD-10-CM

## 2022-04-29 DIAGNOSIS — M79.7 FIBROMYALGIA: ICD-10-CM

## 2022-04-29 DIAGNOSIS — G89.4 CHRONIC PAIN SYNDROME: Primary | ICD-10-CM

## 2022-04-29 DIAGNOSIS — Z53.9 NO SHOW: Primary | ICD-10-CM

## 2022-04-29 DIAGNOSIS — F51.04 PSYCHOPHYSIOLOGICAL INSOMNIA: ICD-10-CM

## 2022-04-29 DIAGNOSIS — F41.1 GENERALIZED ANXIETY DISORDER: Primary | ICD-10-CM

## 2022-04-29 PROCEDURE — 97110 THERAPEUTIC EXERCISES: CPT | Mod: GP | Performed by: PHYSICAL THERAPIST

## 2022-04-29 PROCEDURE — 90791 PSYCH DIAGNOSTIC EVALUATION: CPT | Mod: 52 | Performed by: COUNSELOR

## 2022-04-29 ASSESSMENT — ANXIETY QUESTIONNAIRES
2. NOT BEING ABLE TO STOP OR CONTROL WORRYING: NEARLY EVERY DAY
3. WORRYING TOO MUCH ABOUT DIFFERENT THINGS: SEVERAL DAYS
6. BECOMING EASILY ANNOYED OR IRRITABLE: NEARLY EVERY DAY
IF YOU CHECKED OFF ANY PROBLEMS ON THIS QUESTIONNAIRE, HOW DIFFICULT HAVE THESE PROBLEMS MADE IT FOR YOU TO DO YOUR WORK, TAKE CARE OF THINGS AT HOME, OR GET ALONG WITH OTHER PEOPLE: NOT DIFFICULT AT ALL
5. BEING SO RESTLESS THAT IT IS HARD TO SIT STILL: NOT AT ALL
GAD7 TOTAL SCORE: 11
1. FEELING NERVOUS, ANXIOUS, OR ON EDGE: SEVERAL DAYS
7. FEELING AFRAID AS IF SOMETHING AWFUL MIGHT HAPPEN: NOT AT ALL

## 2022-04-29 ASSESSMENT — PATIENT HEALTH QUESTIONNAIRE - PHQ9: 5. POOR APPETITE OR OVEREATING: NEARLY EVERY DAY

## 2022-04-29 NOTE — PROGRESS NOTES
"Collaborative Care Psychiatry Service  Provider Name: LORRAINE Hoang, [facility]    PATIENT'S NAME: Nena Tang  PREFERRED NAME: Nena  PREFERRED PRONOUNS:    MRN:   [id number]  :   1960   ACCT. NUMBER: [id number]  DATE OF SERVICE: 22  START TIME: 246pm  END TIME: 317pm    BRIEF ADULT DIAGNOSTIC ASSESSMENT    Telemedicine Visit: The patient's condition can be safely assessed and treated via synchronous audio and visual telemedicine encounter.      Reason for Telemedicine Visit: Services only offered telehealth    Originating Site (Patient Location): Patient's home    Distant Site (Provider Location): Provider Remote Setting- Home Office    Consent:  The patient/guardian has verbally consented to: the potential risks and benefits of telemedicine (video visit) versus in person care; bill my insurance or make self-payment for services provided; and responsibility for payment of non-covered services.     Mode of Communication:  phone    As the provider I attest to compliance with applicable laws and regulations related to telemedicine.  The patient has been notified of the following:      \"We have found that certain health care needs can be provided without the need for a face to face visit.  This service lets us provide the care you need with a phone conversation.       I will have full access to your [facility] medical record during this entire phone call.   I will be taking notes for your medical record.      Since this is like an office visit, we will bill your insurance company for this service.       There are potential benefits and risks of telephone visits (e.g. limits to patient confidentiality) that differ from in-person visits.?  Confidentiality still applies for telephone services, and nobody will record the visit.  It is important to be in a quiet, private space that is free of distractions (including cell phone or other devices) during the visit.??      If during the course of the " "call I believe a telephone visit is not appropriate, you will not be charged for this service\"     Consent has been obtained for this service by care team member: Yes     Identifying Information:  Patient is a 62 year old, choose not to answer.  The pronoun use throughout this assessment reflects the patient's chosen pronoun.  Patient was referred for an assessment by primary care provider.  Patient attended the session alone.     Chief Complaint:   The reason for seeking services at this time is: \" depression, anxiety, sleep, mood swings\"   The problem(s) began years ago. Patient has attempted to resolve these concerns in the past through mediation, PCP and psychiatrist.    Reason to meet: depression, anxiety, sleep, mood swings   Depression and anxiety for year more than 10 years.     Sleep: over 20 years falling asleep and staying asleep   Mood swings: a long time will have rapid mood shifts  Taken medication to help with this.     Hope to gain: help     Does the client have any condition that is currently presenting as a potential to harm themselves or others (severe withdrawal, serious medical condition, severe emotional/behavioral problem)? No.  Proceed with assessment.    Review of Symptoms per patient report:  Depression: Change in sleep, Lack of interest, Excessive or inappropriate guilt, Change in energy level, Difficulties concentrating, Change in appetite, Feelings of hopelessness, Feelings of helplessness, Low self-worth, Ruminations, Irritability, Feeling sad, down, or depressed, Withdrawn and Frequent crying  Shannon:  No Symptoms  Psychosis: No Symptoms  Anxiety: Excessive worry, Sleep disturbance, Ruminations, Poor concentration, Irritability and stomach aches  Panic:  No symptoms  Post Traumatic Stress Disorder:  Nightmares   Eating Disorder: No Symptoms  ADD / ADHD:  Inattentive, Difficulties listening, Poor task completion, Distractibility, Forgetful and Restlessness/fidgety  Conduct Disorder: No " symptoms  Autism Spectrum Disorder: No symptoms  Obsessive Compulsive Disorder: Cleaning    Sleep: trouble falling asleep and staying asleep    Caffeine: Coffee, a couple cups per day   Tobacco: Pt denies, never used     Current alcohol use: Pt denies, did not use  Current drug use: Pt denies, did not use    Rating Scales:  PHQ-9:   [facility] Follow-up to PHQ 5/7/2019 12/1/2020 11/15/2021   PHQ-9 9. Suicide Ideation past 2 weeks More than half the days Not at all Not at all      PHQ-2 Score:     PHQ-2 ( 1999 [employer]) 4/8/2022 8/24/2021   Q1: Little interest or pleasure in doing things 0 0   Q2: Feeling down, depressed or hopeless 0 1   PHQ-2 Score 0 1   PHQ-2 Total Score (12-17 Years)- Positive if 3 or more points; Administer PHQ-A if positive - 1       GAD7:    TAMIA-7 SCORE 3/13/2018 10/26/2018 4/29/2022   Total Score - - -   Total Score 17 19 11   Total Score - - -       WHODAS: No flowsheet data found.  Will be completed next meeting due to time constraints.     CAGE:    CAGE-AID Flowsheet 4/29/2022   Have you ever felt you should Cut down on your drinking or drug use? 0   Have people Annoyed you by criticizing your drinking or drug use? 0   Have you ever felt bad or Guilty about your drinking or drug use? 0   Have you ever had a drink or used drugs first thing in the morning to steady your nerves or to get rid of a hangover? (Eye opener) 0   CAGE-AID SCORE 0         Personal Medical History:  Past Medical History:   Diagnosis Date     Acute respiratory failure with hypoxia (H) 9/4/2017     CAD (coronary artery disease)     5/2014 cath, nonbostructive stenosis to LAD, RCA.     Chronic low back pain 1/22/2013     Cocaine abuse, in remission (H)      Fecal urgency 3/8/2012     History of heroin abuse (H)      Hyperlipidemia LDL goal <100 10/31/2010     Hypertension 7/29/2013     Illiterate 8/30/2011     Irritable bowel syndrome      Left cataract      Migraine 4/19/2012     Migraine headache 4/22/2013     Moderate major  depression (H) 6/8/2011     Noncompliance with medication regimen 6/8/2011     Obesity      CINDY (obstructive sleep apnea) 3/8/2012    uses CPAP     CINDY (obstructive sleep apnea)- mild AHI 10.3      Osteopenia 10/7/2009     Pneumonia of right lower lobe due to infectious organism 9/4/2017     Schizoaffective disorder, depressive type (H) 2/25/2013     Sepsis (H) 8/29/2017     Suicidal intent 10/2/2013     Takotsubo cardiomyopathy      Type 2 diabetes mellitus (H) 8/30/2011     Uterine cancer (H) 1983     Verbal auditory hallucination 10/4/2012       Patient has received mental health services in the past: psychiatrist once.  Psychiatric Hospitalizations: None.  Patient denies a history of civil commitment. Currently, patient is not receiving other mental health services. These include none.     Patient does not report a history of head injury / trauma / cognitive impairment / seizures. Pt does have migraines sometimes.     Current Medications:  Current Outpatient Medications   Medication Sig Dispense Refill     acetaminophen (TYLENOL) 325 MG tablet Take 650mg at bedtime scheduled and additional 650mg every 6 hours as needed, max 3000mg/day 200 tablet 3     albuterol (PROAIR HFA/PROVENTIL HFA/VENTOLIN HFA) 108 (90 Base) MCG/ACT inhaler Inhale 2 puffs into the lungs 4 times daily as needed for shortness of breath / dyspnea 8 g 0     Alcohol Swabs (EASY TOUCH ALCOHOL PREP MEDIUM) 70 % PADS USE TO SWAB AREA OF INJECTION/HOLLIS AS DIRECTED. 100 each 4     amantadine (SYMMETREL) 100 MG capsule TAKE 1 CAPSULE BY MOUTH EVERY MORNING AND TAKE 2 CAPSULES BY MOUTH EVERY EVENING 84 capsule 3     aspirin (ASA) 81 MG EC tablet TAKE 1 TABLET (81MG) BY MOUTH DAILY 90 tablet 1     atorvastatin (LIPITOR) 80 MG tablet TAKE 1 TABLET (80MG) BY MOUTH DAILY 30 tablet 11     benzonatate (TESSALON) 100 MG capsule Take 1 capsule (100 mg) by mouth 3 times daily as needed for cough 90 capsule 1     blood glucose (NO BRAND SPECIFIED) test strip  Use to test blood sugar 10 times daily or as directed. 100 strip 11     clonazePAM (KLONOPIN) 0.5 MG tablet TAKE 1 TABLET BY MOUTH AT BEDTIME 28 tablet 0     Continuous Blood Gluc Sensor (DEXCOM G6 SENSOR) MISC Change every 10 days. 3 each 11     Continuous Blood Gluc Transmit (DEXCOM G6 TRANSMITTER) MISC Change every 3 months. 1 each 3     diclofenac (VOLTAREN) 1 % topical gel Apply 4 grams to painful area at bedtime. May use an additional 3 doses during the day as needed 100 g 1     escitalopram (LEXAPRO) 10 MG tablet Take 1 tablet (10 mg) by mouth daily 30 tablet 3     famotidine (PEPCID) 20 MG tablet Take 1 tablet (20 mg) by mouth 2 times daily as needed (GERD) 90 tablet 1     gabapentin (NEURONTIN) 300 MG capsule Take 1 capsule (300 mg) by mouth At Bedtime 30 capsule 3     hydrOXYzine (VISTARIL) 25 MG capsule Take 1 capsule (25 mg) by mouth every 4 hours as needed for anxiety May take nightly for sleep 60 capsule 3     insulin aspart (NOVOLOG FLEXPEN) 100 UNIT/ML pen Inject 6 Units Subcutaneous 3 times daily (with meals) Hold for glucose less than 70. With dinner only add 2 units for every 50 mg/dl blood sugar over 140 with max 16 units. 30 mL 1     insulin aspart (NOVOLOG VIAL) 100 UNITS/ML vial Inject 6-16 Units Subcutaneous       insulin glargine (LANTUS PEN) 100 UNIT/ML pen Inject 26 Units Subcutaneous 2 times daily 60 mL 1     insulin pen needle (NOVOFINE 30) 30G X 8 MM miscellaneous USE 5 DAILY OR AS DIRECTED 400 each 1     losartan (COZAAR) 100 MG tablet Take 1 tablet (100 mg) by mouth daily 30 tablet 3     metoprolol succinate ER (TOPROL-XL) 50 MG 24 hr tablet Take 50mg in the morning and 100mg at night 120 tablet 3     mirabegron (MYRBETRIQ) 25 MG 24 hr tablet Take 1 tablet (25 mg) by mouth daily 90 tablet 3     mirabegron (MYRBETRIQ) 25 MG 24 hr tablet Take 1 tablet (25 mg) by mouth daily 90 tablet 3     naproxen (NAPROSYN) 375 MG tablet Take 1 tablet (375 mg) by mouth 2 times daily as needed for  moderate pain 60 tablet 1     nystatin (MYCOSTATIN) 285083 UNIT/GM external powder Apply topically 2 times daily as needed 45 g 1     order for DME Equipment being ordered: Depends. 30 each 4     order for DME Equipment being ordered: CPAP Supplies. 1 each 0     paliperidone ER (INVEGA) 9 MG 24 hr tablet Take 1 tablet (9 mg) by mouth At Bedtime 30 tablet 3     pantoprazole (PROTONIX) 40 MG EC tablet Take 1 tablet (40 mg) by mouth daily 90 tablet 1     QUEtiapine (SEROQUEL) 25 MG tablet Take 1 tablet (25 mg) by mouth At Bedtime 30 tablet 3     senna-docusate (SENOKOT-S/PERICOLACE) 8.6-50 MG tablet Take 1 tablet daily and take an extra tablet with constipation. 60 tablet 10     thin (NO BRAND SPECIFIED) lancets Use with lanceting device. To accompany: Blood Glucose Monitor Brands: per insurance. 100 each 6     topiramate (TOPAMAX) 200 MG tablet Take 1 tablet (200 mg) by mouth daily 30 tablet 3     traZODone (DESYREL) 100 MG tablet Take 1 tablet (100 mg) by mouth At Bedtime 90 tablet 1     TRULICITY 1.5 MG/0.5ML pen Inject 1.5 mg Subcutaneous once a week 6 mL 3     vitamin C (ASCORBIC ACID) 1000 MG TABS Take 1 tablet (1,000 mg) by mouth in the morning and 1 tablet (1,000 mg) in the evening. 180 tablet 0     zinc oxide (DESITIN) 20 % external ointment Apply topically 3 times daily as needed for irritation (barrier cream) 60 g 3        Allergies:  Allergies   Allergen Reactions     Imidazole Antifungals Hives     Tolerates diflucan     Ketoprofen Itching     Pruritis to topical     Lisinopril Hives     Metformin Other (See Comments)     Patient hospitalized for lactic acidosis - admitting provider suspectd caused by metformin     Metronidazole Hives     Posaconazole Hives     Tolerates diflucan       Family Psychiatric History:  Patient did report a family history of mental health concerns.     Family History     Problem (# of Occurrences) Relation (Name,Age of Onset)    Alcohol/Drug (4) Mother, Sister, Sister, Brother:  " OF HEROIN OVERDOSE AT AGE 22 YRS    C.A.D. (1) Mother    Cancer (3) Mother: BLADDER, Maternal Grandmother: UNKNOWN TYPE, Brother: COLON    Cancer - colorectal (1) Brother: IN HIS LATE 30S    Diabetes (1) Mother    Gastrointestinal Disease (1) Mother: CIRRHOSIS OF LI BOLIVAR    Glaucoma (1) Mother    Hypertension (1) Mother    Lipids (1) Mother    Mental Illness (1) Sister    Psychotic Disorder (1) Sister    Respiratory (1) Mother: COPD       Negative family history of: Macular Degeneration          Social/Family History:  Patient reported they grew up in [facility] in [place], [place]. Pt moved to [place] about 20 years ago. They were raised by biological mother. Patient reported that   childhood was \"fair\".  Patient does not experiencing childhood abuse/neglect. Says it was okay. Patient described their current relationships with family of origin as \"alright\".      The patient has been  1 time and has 2 children.   Pt has been  for unclear. Patient reported having few good friends.     Cultural influences and impact on patient's life structure, values, norms, and healthcare: none stated. Patient identified their preferred language to be English. Patient reported they does need the assistance of an  or other support involved in treatment.       Educational/Occupational History:  Patient reported   highest education level was high school graduate. The patient did not serve in the . Patient is currently disabled. Pt is not at risk of losing their housing. Pt does not identify finances as a stressor.       Social History     Socioeconomic History     Marital status:      Spouse name: Not on file     Number of children: Not on file     Years of education: Not on file     Highest education level: Not on file   Occupational History     Not on file   Tobacco Use     Smoking status: Never Smoker     Smokeless tobacco: Never Used   Substance and Sexual Activity     Alcohol use: No     Comment: last " month     Drug use: No     Comment: history of     Sexual activity: Never     Partners: Male     Birth control/protection: None, Condom   Other Topics Concern     Parent/sibling w/ CABG, MI or angioplasty before 65F 55M? Not Asked      Service No     Blood Transfusions No     Caffeine Concern No     Occupational Exposure No     Hobby Hazards No     Sleep Concern Yes     Stress Concern Yes     Weight Concern Yes     Special Diet Yes     Comment: DM     Back Care Yes     Exercise Yes     Comment: WALKS DAILY     Bike Helmet Not Asked     Seat Belt Yes     Self-Exams No     Comment: ENCOURAGED   Social History Narrative     10/2014. Has 2 sons. 7 grandchildren.         Unemployed. Graduated HS.         Tobacco use: Denies    Alcohol use: Escalated use since   10/2014    Drug: Denies     Social Determinants of Health     Financial Resource Strain: Not on file   Food Insecurity: Not on file   Transportation Needs: Not on file   Physical Activity: Not on file   Stress: Not on file   Social Connections: Not on file   Intimate Partner Violence: Not on file   Housing Stability: Not on file       Patient reported that they has never been involved with the legal system. Patient denies being on probation / parole / under the jurisdiction of the court.    Current Mental Status Exam:   Appearance:   Unable to assess-telephone visit  Eye Contact:   Unable to assess-telephone visit  Psychomotor:   Normal   Attitude / Demeanor:  Cooperative   Speech      Rate / Production:  Normal/ Responsive      Volume:   Soft  volume      Language:   intact  Mood:    Normal  Affect:    Appropriate    Thought Content:  Clear   Thought Process:  Goal Directed  Logical       Associations:  No loosening of associations  Insight:    Fair   Judgment:   Intact   Orientation:   All  Attention/concentration: Fair      Safety Assessment:   Current Safety Concerns:  [place] Suicide Severity Rating Scale (Short Version)  [place]  Suicide Severity Rating (Short Version) 10/4/2020 6/4/2021 6/15/2021 6/15/2021 7/15/2021 8/10/2021 9/25/2021   Over the past 2 weeks have you felt down, depressed, or hopeless? yes no no no no no no   Over the past 2 weeks have you had thoughts of killing yourself? no no no no no no no   Have you ever attempted to kill yourself? no no no no no no no   When did this last happen? - - - - - - -   Q1 Wished to be Dead (Past Month) - - - - - - -   Q2 Suicidal Thoughts (Past Month) - - - - - - -   Q3 Suicidal Thought Method - - - - - - -   Q4 Suicidal Intent without Specific Plan - - - - - - -   Q5 Suicide Intent with Specific Plan - - - - - - -   Q6 Suicide Behavior (Lifetime) - - - - - - -   Pt denies SI during their lifetime, reports SIB a year ago, with pills, is not very clear about this, [facility] will complete [place] next meeting when able to gather more information.   Patient denies current homicidal ideation and behaviors.  Patient denies current self-injurious ideation and behaviors.    Patient denied risk behaviors associated with substance use.  Patient denies any high risk behaviors associated with mental health symptoms.  Patient reports the following current concerns for their personal safety: None.   Patient reports there are no firearms in the house.    History of Safety Concerns:  Patient denied a history of homicidal ideation.     Patient denied a history of personal safety concerns.    Patient denied a history of assaultive behaviors.    Patient denied a history of sexual assault behaviors.     Patient denied a history of risk behaviors associated with substance use.  Patient denies any history of high risk behaviors associated with mental health symptoms.  Patient reports the following protective factors: forward/future oriented thinking, dedication to family/friends, safe and stable environment, abstinence from substances, adherence with prescribed medication, living with other people, daily obligations,  structured day, effective problem-solving skills and positive social skills    Risk Plan:  See Preliminary Treatment Plan for Safety and Risk Management Plan    Diagnosis:  Diagnostic Criteria:   Generalized Anxiety Disorder  A. Excessive anxiety and worry about a number of events or activities (such as work or school performance).   B. The person finds it difficult to control the worry.  C. Select 3 or more symptoms (required for diagnosis). Only one item is required in children.   - Restlessness or feeling keyed up or on edge.    - Being easily fatigued.    - Difficulty concentrating or mind going blank.    - Irritability.    - Muscle tension.    - Sleep disturbance (difficulty falling or staying asleep, or restless unsatisfying sleep).   D. The focus of the anxiety and worry is not confined to features of an Axis I disorder.  E. The anxiety, worry, or physical symptoms cause clinically significant distress or impairment in social, occupational, or other important areas of functioning.   F. The disturbance is not due to the direct physiological effects of a substance (e.g., a drug of abuse, a medication) or a general medical condition (e.g., hyperthyroidism) and does not occur exclusively during a Mood Disorder, a Psychotic Disorder, or a Pervasive Developmental Disorder. Major Depressive Disorder  CRITERIA (A-C) REPRESENT A MAJOR DEPRESSIVE EPISODE - SELECT THESE CRITERIA  A) Recurrent episode(s) - symptoms have been present during the same 2-week period and represent a change from previous functioning 5 or more symptoms (required for diagnosis)   - Depressed mood. Note: In children and adolescents, can be irritable mood.     - Diminished interest or pleasure in all, or almost all, activities.    - Decreased sleep.    - Fatigue or loss of energy.    - Feelings of worthlessness or inappropriate and excessive guilt.    - Diminished ability to think or concentrate, or indecisiveness.   B) The symptoms cause  clinically significant distress or impairment in social, occupational, or other important areas of functioning  C) The episode is not attributable to the physiological effects of a substance or to another medical condition  D) The occurence of major depressive episode is not better explained by other thought / psychotic disorders  E) There has never been a manic episode or hypomanic episode    Diagnoses:  1. Generalized anxiety disorder    2. Moderate episode of recurrent major depressive disorder (H)        Patient's Strengths and Limitations:  Patient identified the following strengths or resources that will help them succeed in treatment: commitment to health and well being, friends / good social support, family support, insight, intelligence and motivation. Things that may interfere with the patient's success in treatment include: none identified.     Recommendations:     1. Plan for Safety and Risk Management:Recommended that patient call 911 or go to the local ED should there be a change in any of these risk factors.  Report to child / adult protection services was N/A.      2. Resources/Service Plan:       services are not indicated.     Modifications to assist communication are not indicated.     Additional disability accommodations are not indicated.      3. Collaboration:  Collaboration / coordination of treatment will be initiated with the following support professionals:   [facility]      4.  Referrals:   The following referral(s) will be initiated: TBD.       Staff Name/Credentials:  LORRAINE Hoang, [facility]  April 29, 2022

## 2022-04-29 NOTE — PROGRESS NOTES
Patient did not  her phone to get the intake interview completed.  I left a message for the assisted living RN staff, [place], to connect in order to complete the process for med management.

## 2022-04-30 ENCOUNTER — HEALTH MAINTENANCE LETTER (OUTPATIENT)
Age: 62
End: 2022-04-30

## 2022-04-30 ASSESSMENT — ANXIETY QUESTIONNAIRES: GAD7 TOTAL SCORE: 11

## 2022-05-04 ENCOUNTER — THERAPY VISIT (OUTPATIENT)
Dept: PHYSICAL THERAPY | Facility: CLINIC | Age: 62
End: 2022-05-04
Payer: COMMERCIAL

## 2022-05-04 DIAGNOSIS — F25.1 SCHIZOAFFECTIVE DISORDER, DEPRESSIVE TYPE (H): ICD-10-CM

## 2022-05-04 DIAGNOSIS — M79.7 FIBROMYALGIA: ICD-10-CM

## 2022-05-04 DIAGNOSIS — F20.89 OTHER SCHIZOPHRENIA (H): ICD-10-CM

## 2022-05-04 DIAGNOSIS — G89.4 CHRONIC PAIN SYNDROME: Primary | ICD-10-CM

## 2022-05-04 DIAGNOSIS — I25.119 CORONARY ARTERY DISEASE INVOLVING NATIVE HEART WITH ANGINA PECTORIS, UNSPECIFIED VESSEL OR LESION TYPE (H): ICD-10-CM

## 2022-05-04 PROCEDURE — 97110 THERAPEUTIC EXERCISES: CPT | Mod: GP | Performed by: PHYSICAL THERAPIST

## 2022-05-05 RX ORDER — LOSARTAN POTASSIUM 100 MG/1
TABLET ORAL
Qty: 28 TABLET | Refills: 3 | Status: SHIPPED | OUTPATIENT
Start: 2022-05-05 | End: 2022-08-29

## 2022-05-05 RX ORDER — ESCITALOPRAM OXALATE 10 MG/1
TABLET ORAL
Qty: 28 TABLET | Refills: 0 | Status: SHIPPED | OUTPATIENT
Start: 2022-05-05 | End: 2022-06-01

## 2022-05-05 RX ORDER — TRAZODONE HYDROCHLORIDE 100 MG/1
100 TABLET ORAL AT BEDTIME
Qty: 90 TABLET | Refills: 1 | Status: SHIPPED | OUTPATIENT
Start: 2022-05-05 | End: 2022-10-19

## 2022-05-05 RX ORDER — PALIPERIDONE 9 MG/1
TABLET, EXTENDED RELEASE ORAL
Qty: 28 TABLET | Refills: 3 | Status: SHIPPED | OUTPATIENT
Start: 2022-05-05 | End: 2022-09-26

## 2022-05-05 NOTE — TELEPHONE ENCOUNTER
"Requested Prescriptions   Pending Prescriptions Disp Refills     paliperidone ER (INVEGA) 9 MG 24 hr tablet [Pharmacy Med Name: Paliperidone ER 9MG TB24] 28 tablet      Sig: TAKE 1 TABLET BY MOUTH AT BEDTIME       There is no refill protocol information for this order        losartan (COZAAR) 100 MG tablet [Pharmacy Med Name: Losartan Potassium 100MG TABS] 28 tablet      Sig: TAKE 1 TABLET BY MOUTH DAILY       Angiotensin-II Receptors Failed - 5/4/2022  2:49 PM        Failed - Last blood pressure under 140/90 in past 12 months     BP Readings from Last 3 Encounters:   03/24/22 (!) 153/72   03/24/22 (!) 153/72   03/08/22 122/47                 Failed - Normal serum creatinine on file in past 12 months     Recent Labs   Lab Test 03/24/22  1211 05/16/19  0658 05/15/19  1834   CR 1.06*   < >  --    CREAT  --   --  0.9    < > = values in this interval not displayed.       Ok to refill medication if creatinine is low          Passed - Recent (12 mo) or future (30 days) visit within the authorizing provider's specialty     Patient has had an office visit with the authorizing provider or a provider within the authorizing providers department within the previous 12 mos or has a future within next 30 days. See \"Patient Info\" tab in inbasket, or \"Choose Columns\" in Meds & Orders section of the refill encounter.              Passed - Medication is active on med list        Passed - Patient is age 18 or older        Passed - No active pregnancy on record        Passed - Normal serum potassium on file in past 12 months     Recent Labs   Lab Test 03/24/22  1211   POTASSIUM 4.5                    Passed - No positive pregnancy test in past 12 months         Signed Prescriptions Disp Refills    escitalopram (LEXAPRO) 10 MG tablet 28 tablet 0     Sig: TAKE 1 TABLET BY MOUTH DAILY       SSRIs Protocol Passed - 5/4/2022  2:49 PM        Passed - PHQ-9 score less than 5 in past 6 months     Please review last PHQ-9 score.           " "Passed - Medication is active on med list        Passed - Patient is age 18 or older        Passed - No active pregnancy on record        Passed - No positive pregnancy test in last 12 months        Passed - Recent (6 mo) or future (30 days) visit within the authorizing provider's specialty     Patient had office visit in the last 6 months or has a visit in the next 30 days with authorizing provider or within the authorizing provider's specialty.  See \"Patient Info\" tab in inbasket, or \"Choose Columns\" in Meds & Orders section of the refill encounter.               Routing refill request to provider for review/approval because:  Drug not on the [facility] refill protocol   Labs out of range:  Cr  BP    ThanksHannah RN  [facility]           "

## 2022-05-05 NOTE — TELEPHONE ENCOUNTER
"Requested Prescriptions   Signed Prescriptions Disp Refills    traZODone (DESYREL) 100 MG tablet 90 tablet 1     Sig: Take 1 tablet (100 mg) by mouth At Bedtime       Serotonin Modulators Passed - 5/4/2022  3:07 PM        Passed - Recent (12 mo) or future (30 days) visit within the authorizing provider's specialty     Patient has had an office visit with the authorizing provider or a provider within the authorizing providers department within the previous 12 mos or has a future within next 30 days. See \"Patient Info\" tab in inbasket, or \"Choose Columns\" in Meds & Orders section of the refill encounter.              Passed - Medication is active on med list        Passed - Patient is age 18 or older        Passed - No active pregnancy on record        Passed - No positive pregnancy test in past 12 months           Hannah Vu RN  [facility]       "

## 2022-05-06 DIAGNOSIS — M79.7 FIBROMYALGIA: ICD-10-CM

## 2022-05-06 DIAGNOSIS — F25.0 SCHIZOAFFECTIVE DISORDER, BIPOLAR TYPE (H): ICD-10-CM

## 2022-05-06 DIAGNOSIS — M54.50 CHRONIC RIGHT-SIDED LOW BACK PAIN WITHOUT SCIATICA: ICD-10-CM

## 2022-05-06 DIAGNOSIS — F25.1 SCHIZOAFFECTIVE DISORDER, DEPRESSIVE TYPE (H): ICD-10-CM

## 2022-05-06 DIAGNOSIS — G89.29 CHRONIC RIGHT-SIDED LOW BACK PAIN WITHOUT SCIATICA: ICD-10-CM

## 2022-05-06 DIAGNOSIS — K21.9 GASTROESOPHAGEAL REFLUX DISEASE WITHOUT ESOPHAGITIS: ICD-10-CM

## 2022-05-06 DIAGNOSIS — G89.4 CHRONIC PAIN SYNDROME: ICD-10-CM

## 2022-05-09 ENCOUNTER — OFFICE VISIT (OUTPATIENT)
Dept: FAMILY MEDICINE | Facility: CLINIC | Age: 62
End: 2022-05-09
Payer: COMMERCIAL

## 2022-05-09 VITALS
HEART RATE: 74 BPM | TEMPERATURE: 97 F | DIASTOLIC BLOOD PRESSURE: 82 MMHG | OXYGEN SATURATION: 96 % | SYSTOLIC BLOOD PRESSURE: 132 MMHG | WEIGHT: 246 LBS | BODY MASS INDEX: 48.04 KG/M2

## 2022-05-09 DIAGNOSIS — Z79.4 TYPE 2 DIABETES MELLITUS WITH HYPERGLYCEMIA, WITH LONG-TERM CURRENT USE OF INSULIN (H): ICD-10-CM

## 2022-05-09 DIAGNOSIS — E11.65 TYPE 2 DIABETES MELLITUS WITH HYPERGLYCEMIA, WITH LONG-TERM CURRENT USE OF INSULIN (H): ICD-10-CM

## 2022-05-09 DIAGNOSIS — F25.0 SCHIZOAFFECTIVE DISORDER, BIPOLAR TYPE (H): ICD-10-CM

## 2022-05-09 DIAGNOSIS — K21.9 GASTROESOPHAGEAL REFLUX DISEASE WITHOUT ESOPHAGITIS: ICD-10-CM

## 2022-05-09 DIAGNOSIS — E11.3299 TYPE 2 DIABETES MELLITUS WITH MILD NONPROLIFERATIVE RETINOPATHY WITHOUT MACULAR EDEMA, WITH LONG-TERM CURRENT USE OF INSULIN, UNSPECIFIED LATERALITY (H): ICD-10-CM

## 2022-05-09 DIAGNOSIS — Z79.4 TYPE 2 DIABETES MELLITUS WITH MILD NONPROLIFERATIVE RETINOPATHY WITHOUT MACULAR EDEMA, WITH LONG-TERM CURRENT USE OF INSULIN, UNSPECIFIED LATERALITY (H): ICD-10-CM

## 2022-05-09 PROCEDURE — 99215 OFFICE O/P EST HI 40 MIN: CPT | Performed by: NURSE PRACTITIONER

## 2022-05-09 RX ORDER — PANTOPRAZOLE SODIUM 40 MG/1
40 TABLET, DELAYED RELEASE ORAL DAILY
Qty: 90 TABLET | Refills: 1 | Status: SHIPPED | OUTPATIENT
Start: 2022-05-09 | End: 2022-10-18

## 2022-05-09 RX ORDER — QUETIAPINE FUMARATE 25 MG/1
25 TABLET, FILM COATED ORAL AT BEDTIME
Qty: 30 TABLET | Refills: 3 | Status: SHIPPED | OUTPATIENT
Start: 2022-05-09 | End: 2022-08-30

## 2022-05-09 RX ORDER — CLONAZEPAM 0.5 MG/1
0.5 TABLET ORAL AT BEDTIME
Qty: 30 TABLET | Refills: 0 | Status: SHIPPED | OUTPATIENT
Start: 2022-05-09 | End: 2022-06-21

## 2022-05-09 RX ORDER — TOPIRAMATE 200 MG/1
200 TABLET, FILM COATED ORAL DAILY
Qty: 30 TABLET | Refills: 3 | Status: SHIPPED | OUTPATIENT
Start: 2022-05-09 | End: 2022-08-30

## 2022-05-09 RX ORDER — FAMOTIDINE 20 MG/1
20 TABLET, FILM COATED ORAL 2 TIMES DAILY PRN
Qty: 90 TABLET | Refills: 1 | Status: SHIPPED | OUTPATIENT
Start: 2022-05-09 | End: 2022-08-23

## 2022-05-09 RX ORDER — CLONAZEPAM 0.5 MG/1
0.5 TABLET ORAL AT BEDTIME
Qty: 28 TABLET | Refills: 0 | Status: CANCELLED | OUTPATIENT
Start: 2022-05-09

## 2022-05-09 RX ORDER — INSULIN ASPART 100 [IU]/ML
8 INJECTION, SOLUTION INTRAVENOUS; SUBCUTANEOUS
Qty: 30 ML | Refills: 1 | Status: SHIPPED | OUTPATIENT
Start: 2022-05-09 | End: 2022-07-20

## 2022-05-09 RX ORDER — GABAPENTIN 300 MG/1
300 CAPSULE ORAL AT BEDTIME
Qty: 30 CAPSULE | Refills: 3 | Status: SHIPPED | OUTPATIENT
Start: 2022-05-09 | End: 2022-08-30

## 2022-05-09 NOTE — PATIENT INSTRUCTIONS
- Medications refilled.   - Needs help with her CPAP.   - Increase Lantus to 28 units twice per day.   - Increase Novolog to 8 units with meals.   - Mental health therapy referral placed.   -Call clinic with any questions or concerns.

## 2022-05-09 NOTE — TELEPHONE ENCOUNTER
Requested Prescriptions   Pending Prescriptions Disp Refills     QUEtiapine (SEROQUEL) 25 MG tablet 30 tablet 3     Sig: Take 1 tablet (25 mg) by mouth At Bedtime       Antipsychotic Medications Failed - 5/6/2022  3:38 PM        Failed - Blood pressure under 140/90 in past 12 months     BP Readings from Last 3 Encounters:   03/24/22 (!) 153/72   03/24/22 (!) 153/72   03/08/22 122/47                 Passed - Patient is 12 years of age or older        Passed - Lipid panel on file within the past 12 months     Recent Labs   Lab Test 03/24/22  1211 07/13/16  1350 07/14/15  1215   CHOL 173   < > 147   TRIG 190*   < > 151*   HDL 54   < > 39*   LDL 81   < > 78   NHDL 119   < >  --    VLDL  --   --  30   CHOLHDLRATIO  --   --  3.8    < > = values in this interval not displayed.               Passed - CBC on file in past 12 months     Recent Labs   Lab Test 01/06/22  1539   WBC 5.2   RBC 3.57*   HGB 11.2*   HCT 35.1                    Passed - Heart Rate on file within past 12 months     Pulse Readings from Last 3 Encounters:   03/24/22 72   03/24/22 72   02/23/22 74               Passed - A1c or Glucose on file in past 12 months     Recent Labs   Lab Test 03/24/22  1211 02/25/22  1318   *  --    A1C 7.3*  --    71757  --  7.7*       Please review patients last 3 weights. If a weight gain of >10 lbs exists, you may refill the prescription once after instructing the patient to schedule an appointment within the next 30 days.    Wt Readings from Last 3 Encounters:   04/08/22 108.9 kg (240 lb)   03/24/22 108 kg (238 lb)   03/24/22 108 kg (238 lb)             Passed - Medication is active on med list        Passed - Patient is not pregnant        Passed - No positve pregnancy test on file in past 12 months        Passed - Recent (6 mo) or future (30 days) visit within the authorizing provider's specialty     Patient had office visit in the last 6 months or has a visit in the next 30 days with authorizing provider  "or within the authorizing provider's specialty.  See \"Patient Info\" tab in inbasket, or \"Choose Columns\" in Meds & Orders section of the refill encounter.               topiramate (TOPAMAX) 200 MG tablet 30 tablet 3     Sig: Take 1 tablet (200 mg) by mouth daily       Anti-Seizure Meds Protocol  Failed - 5/6/2022  3:38 PM        Failed - Review Authorizing provider's last note.      Refer to last progress notes: confirm request is for original authorizing provider (cannot be through other providers).          Failed - Normal CBC on file in past 26 months     Recent Labs   Lab Test 01/06/22  1539   WBC 5.2   RBC 3.57*   HGB 11.2*   HCT 35.1                    Passed - Recent (12 mo) or future (30 days) visit within the authorizing provider's specialty     Patient has had an office visit with the authorizing provider or a provider within the authorizing providers department within the previous 12 mos or has a future within next 30 days. See \"Patient Info\" tab in inSpeech [place], or \"Choose Columns\" in Meds & Orders section of the refill encounter.              Passed - Normal ALT or AST on file in past 26 months     Recent Labs   Lab Test 03/24/22  1211   ALT 27     Recent Labs   Lab Test 03/24/22  1211   AST 11             Passed - Normal platelet count on file in past 26 months     Recent Labs   Lab Test 01/06/22  1539                  Passed - Medication is active on med list        Passed - No active pregnancy on record        Passed - No positive pregnancy test in last 12 months           gabapentin (NEURONTIN) 300 MG capsule 30 capsule 3     Sig: Take 1 capsule (300 mg) by mouth At Bedtime       There is no refill protocol information for this order        clonazePAM (KLONOPIN) 0.5 MG tablet 28 tablet 0     Sig: Take 1 tablet (0.5 mg) by mouth At Bedtime       There is no refill protocol information for this order      Signed Prescriptions Disp Refills    famotidine (PEPCID) 20 MG tablet 90 tablet 1     " "Sig: Take 1 tablet (20 mg) by mouth 2 times daily as needed (GERD)       H2 Blockers Protocol Passed - 5/6/2022  3:38 PM        Passed - Patient is age 12 or older        Passed - Recent (12 mo) or future (30 days) visit within the authorizing provider's specialty     Patient has had an office visit with the authorizing provider or a provider within the authorizing providers department within the previous 12 mos or has a future within next 30 days. See \"Patient Info\" tab in inbasket, or \"Choose Columns\" in Meds & Orders section of the refill encounter.              Passed - Medication is active on med list           Routing refill request to provider for review/approval because:  Drug not on the [facility] refill protocol   BP    ThanksHannah RN  [facility]             "

## 2022-05-09 NOTE — PROGRESS NOTES
Assessment & Plan     Schizoaffective disorder, bipolar type (H)  Stable with her current medications. Established with psychiatry and also will be scheduling with therapy for ongoing care for her mental health.   - clonazePAM (KLONOPIN) 0.5 MG tablet; Take 1 tablet (0.5 mg) by mouth At Bedtime  - Adult Mental [facility]  Referral; Future    Type 2 diabetes mellitus with mild nonproliferative retinopathy without macular edema, with long-term current use of insulin, unspecified laterality (H)  Type 2 diabetes mellitus with hyperglycemia, with long-term current use of insulin (H)  Uncontrolled but significantly improved since she moved into the group home.   - Continue to monitor. Follow-up with MTM as discussed.   - insulin glargine (LANTUS PEN) 100 UNIT/ML pen; Inject 28 Units Subcutaneous 2 times daily  - insulin aspart (NOVOLOG FLEXPEN) 100 UNIT/ML pen; Inject 8 Units Subcutaneous 3 times daily (with meals) Hold for glucose less than 70. With dinner only add 2 units for every 50 mg/dl blood sugar over 140 with max 16 units.    Gastroesophageal reflux disease without esophagitis  Stable with current medications. Refills done.   - pantoprazole (PROTONIX) 40 MG EC tablet; Take 1 tablet (40 mg) by mouth daily    Prescription drug management  I spent a total of 42 minutes on the day of the visit.   Time spent doing chart review, history and exam, documentation and further activities per the note     Blood sugar testing frequency justification:  Uncontrolled diabetes and Adjustment of medication(s)  Patient Instructions   - Medications refilled.   - Needs help with her CPAP.   - Increase Lantus to 28 units twice per day.   - Increase Novolog to 8 units with meals.   - Mental health therapy referral placed.   -Call clinic with any questions or concerns.         Return in about 4 weeks (around 6/6/2022) for Diabetes., Routine Visit.    ART [facility]  BLAS Barrett is a 62 year old who presents for the following health issues:     History of Present Illness       Diabetes:   She presents for follow up of diabetes.  She is checking home blood glucose four or more times daily. She checks blood glucose before and after meals and at bedtime.  Blood glucose is sometimes over 200 and never under 70. She is aware of hypoglycemia symptoms including shakiness, dizziness and weakness. She is concerned about blood sugar frequently over 200. She is having weight gain.         Reason for visit:  Check upShe exercises with enough effort to increase her heart rate 9 or less minutes per day.    She is taking medications regularly.       Mental Health: Plans for a family reunion in August. Goal is to lose 15 lbs by her family reunion. Notes her mood has been up and down. But notes that she is doing ok at her group home. Reports that her sister and her family are moving to [place]. Patient states that she is ok with this changes.   Sleep: good. Will start using CPAP tonight.   Appetite: good, increased appetite? Medications. Will follow-up with MTM for medication management.     Diabetes: checking 3-4 times in the morning. Mostly 200-300's in the morning. about the same at bedtime. Reports show high 200-300 over the past couple of weeks. She does report eating more candy in general.   Will follow-up with Eugenia (MTM) on other diabetes medication options that might be helpful in helping with weigh loss.   Stopped drinking tea and switched to coffee with half and half with creamer.     Hypertension: Reports higher SBP's 140-150's. Denies any new symptoms. No chest pain, shortness of breath, heart palpitations. Reports bilateral swelling on legs and arms.     Obesity: Started PT, and also [facility] and also [facility] other 2 days at the center.     Review of Systems   Constitutional, HEENT,  cardiovascular, pulmonary, gi and gu systems are negative, except as otherwise noted.      Objective    /82   Pulse 74   Temp 97  F (36.1  C) (Temporal)   Wt 111.6 kg (246 lb)   LMP 01/06/2015 (Exact Date)   SpO2 96%   BMI 48.04 kg/m    Body mass index is 48.04 kg/m .  Physical Exam   GENERAL: healthy, alert and no distress  EYES: Eyes grossly normal to inspection, PERRL and conjunctivae and sclerae normal  RESP: unlabored.   MS: no gross musculoskeletal defects noted, no edema  NEURO: Normal strength and tone, mentation intact and speech normal

## 2022-05-10 ENCOUNTER — TELEPHONE (OUTPATIENT)
Dept: BEHAVIORAL HEALTH | Facility: CLINIC | Age: 62
End: 2022-05-10
Payer: COMMERCIAL

## 2022-05-10 NOTE — TELEPHONE ENCOUNTER
Reached patient and scheduled initial TC therapy appointment for 5/13/2022 at 3pm with Larissa. Intake documents sent over my chart.    Tyra Dutton  Transition Clinic Coordinator  Date and Time: 05/10/22 10:43 AM        ----- Message from Bossman Waterman sent at 5/10/2022 10:28 AM CDT -----  Transition Clinic Referral   [place] Only   Limited [place] Availability    Type of Referral:      _X____Therapy  _____Therapy & Medication (Psychiatry next level of care appointment needs to be scheduled)  _____Medication Only (Psychiatry next level of care appointment needs to be scheduled)  _____Diagnostic Assessment Only      Referring Provider Name: Bossman Waterman    Clinician completing the assessment. Rowena Haas    Referring Provider: [facility] PROVIDER    If known, referring provider contact name: Rowena Haas; Phone Number: unk  Service Line/Location: unk    Reason for Transition Clinic Referral: Bridge appt gap     Next Level of Care Patient Will Be Transitioned To: Ind therapy     Start Date for Next Level of Care Therapy (Required): 10/17/22  Provider  Location 10/17/2022 Status: Scheduled   Time: 11:00 AM Length: 60  Visit Type: ADULT PSYCHOTHERAPY NEW [[id number]] Copay: $0.00  Provider: Aniya Mcgregor LICSW Department: [facility]  Bill Area: Psychology CS        Start Date for Next Level of Care Medication (Required): na   Provider na  Location na  TC Psychiatry cannot see patients who do not have active medical insurance    What Would Be Helpful from the Transition Clinic: bridge gap      Needs: NO    Does Patient Have Access to Technology: yes    Patient E-mail Address: [email or web address]    Current Patient Phone Number: [phone];     Clinician Gender Preference (if applicable): NO    Bossman Waterman

## 2022-05-12 ENCOUNTER — MEDICAL CORRESPONDENCE (OUTPATIENT)
Dept: HEALTH INFORMATION MANAGEMENT | Facility: CLINIC | Age: 62
End: 2022-05-12
Payer: COMMERCIAL

## 2022-05-13 ENCOUNTER — THERAPY VISIT (OUTPATIENT)
Dept: PHYSICAL THERAPY | Facility: CLINIC | Age: 62
End: 2022-05-13
Payer: COMMERCIAL

## 2022-05-13 DIAGNOSIS — M79.7 FIBROMYALGIA: ICD-10-CM

## 2022-05-13 DIAGNOSIS — G89.4 CHRONIC PAIN SYNDROME: Primary | ICD-10-CM

## 2022-05-13 PROCEDURE — 97110 THERAPEUTIC EXERCISES: CPT | Mod: GP

## 2022-05-18 ENCOUNTER — TELEPHONE (OUTPATIENT)
Dept: BEHAVIORAL HEALTH | Facility: CLINIC | Age: 62
End: 2022-05-18
Payer: COMMERCIAL

## 2022-05-25 ENCOUNTER — THERAPY VISIT (OUTPATIENT)
Dept: PHYSICAL THERAPY | Facility: CLINIC | Age: 62
End: 2022-05-25
Payer: COMMERCIAL

## 2022-05-25 DIAGNOSIS — M79.7 FIBROMYALGIA: ICD-10-CM

## 2022-05-25 DIAGNOSIS — G89.4 CHRONIC PAIN SYNDROME: Primary | ICD-10-CM

## 2022-05-25 PROCEDURE — 97112 NEUROMUSCULAR REEDUCATION: CPT | Mod: GP | Performed by: PHYSICAL THERAPIST

## 2022-05-25 PROCEDURE — 97110 THERAPEUTIC EXERCISES: CPT | Mod: GP | Performed by: PHYSICAL THERAPIST

## 2022-06-03 ENCOUNTER — THERAPY VISIT (OUTPATIENT)
Dept: PHYSICAL THERAPY | Facility: CLINIC | Age: 62
End: 2022-06-03
Payer: COMMERCIAL

## 2022-06-03 DIAGNOSIS — G89.4 CHRONIC PAIN SYNDROME: Primary | ICD-10-CM

## 2022-06-03 DIAGNOSIS — M79.7 FIBROMYALGIA: ICD-10-CM

## 2022-06-03 PROCEDURE — 97110 THERAPEUTIC EXERCISES: CPT | Mod: GP | Performed by: PHYSICAL THERAPIST

## 2022-06-03 NOTE — PROGRESS NOTES
[facility]    OUTPATIENT Physical Therapy ORTHOPEDIC EVALUATION  PLAN OF TREATMENT FOR OUTPATIENT REHABILITATION  (COMPLETE FOR INITIAL CLAIMS ONLY)  Patient's Last Name, First Name, M.I.  YOB: 1960  Nena Tang    Provider s Name:  [facility]   Medical Record No.  [id number]   Start of Care Date:  04/20/22   Onset Date:   03/24/22 (Order date)   Type:     _X__PT   ___OT Medical Diagnosis:    Encounter Diagnoses   Name Primary?    Chronic pain syndrome Yes    Fibromyalgia         Treatment Diagnosis:  Chronic pain / Fibromyalgia        Goals:     06/03/22 0500   Body Part   Goals listed below are for Chronic pain   Goal #1   Goal #1 ambulation   Current Functional Level Blocks patient can walk   Performance Level 3 before fatigue   STG [employer] Performance Blocks patient will be able to  walk;without assistive device   Performance Level 5 before fatigue   Rationale for safe household ambulation;for safe outdoor household ambulation;for safe community ambulation;to maintain proper body mechanics/posture while ambulating to avoid additional compensatory injury due to improper gait mechanics;to promote a healthy and active lifestyle   Due Date 07/03/22    LTG [employer] Blocks patient will be able to walk   Performance Level 7 before fatigue   Rationale for safe household ambulation;for safe outdoor household ambulation;for safe community ambulation;to maintain proper body mechanics/posture while ambulating to avoid additional compensatory injury due to improper gait mechanics;to promote a healthy and active lifestyle   Due Date 09/01/22   Previous Functional Level Minutes patient could stand   Performance level 10   Current Functional Level Minutes patient can stand   Performance level 2, fatigue 8/10   STG [employer] Performance Minutes patient will be able  to stand   Performance level 5, fatigue 4/10   Rationale for personal hygiene;for safe community ambulation  (to reduce risk of falls)   Due date 05/18/22   Date Goal Met 06/03/22   LTG [employer] Performance Minutes patient will be able to stand   Performance Level 10, fatigue 2/10   Rationale for safe community ambulation  (to reduce risk of falls)   Due date 06/14/22   Date Goal Met 06/03/22   If goal not met, Why? she can walk for 15 minutes       Therapy Frequency:  1x/week  Predicted Duration of Therapy Intervention:  x4 weeks, tapering to 2x/month x 1 month    JENNIFER ROSS, PT                 I CERTIFY THE NEED FOR THESE SERVICES FURNISHED UNDER        THIS PLAN OF TREATMENT AND WHILE UNDER MY CARE     (Physician attestation of this document indicates review and certification of the therapy plan).                     Certification Date From:  06/03/22   Certification Date To:  09/01/22    Referring Provider:  Rowena Haas    Initial Assessment        See Epic Evaluation SOC Date: 04/20/22

## 2022-06-10 ENCOUNTER — THERAPY VISIT (OUTPATIENT)
Dept: PHYSICAL THERAPY | Facility: CLINIC | Age: 62
End: 2022-06-10
Payer: COMMERCIAL

## 2022-06-10 DIAGNOSIS — M79.7 FIBROMYALGIA: ICD-10-CM

## 2022-06-10 DIAGNOSIS — G89.4 CHRONIC PAIN SYNDROME: Primary | ICD-10-CM

## 2022-06-10 PROCEDURE — 97110 THERAPEUTIC EXERCISES: CPT | Mod: GP | Performed by: PHYSICAL THERAPIST

## 2022-06-17 ENCOUNTER — THERAPY VISIT (OUTPATIENT)
Dept: PHYSICAL THERAPY | Facility: CLINIC | Age: 62
End: 2022-06-17
Payer: COMMERCIAL

## 2022-06-17 DIAGNOSIS — G89.4 CHRONIC PAIN SYNDROME: Primary | ICD-10-CM

## 2022-06-17 DIAGNOSIS — M79.7 FIBROMYALGIA: ICD-10-CM

## 2022-06-17 PROCEDURE — 97112 NEUROMUSCULAR REEDUCATION: CPT | Mod: GP | Performed by: PHYSICAL THERAPIST

## 2022-06-17 PROCEDURE — 97110 THERAPEUTIC EXERCISES: CPT | Mod: GP | Performed by: PHYSICAL THERAPIST

## 2022-06-21 ENCOUNTER — OFFICE VISIT (OUTPATIENT)
Dept: PHARMACY | Facility: CLINIC | Age: 62
End: 2022-06-21
Payer: COMMERCIAL

## 2022-06-21 ENCOUNTER — MEDICAL CORRESPONDENCE (OUTPATIENT)
Dept: FAMILY MEDICINE | Facility: CLINIC | Age: 62
End: 2022-06-21

## 2022-06-21 ENCOUNTER — OFFICE VISIT (OUTPATIENT)
Dept: FAMILY MEDICINE | Facility: CLINIC | Age: 62
End: 2022-06-21
Payer: COMMERCIAL

## 2022-06-21 VITALS
HEART RATE: 76 BPM | OXYGEN SATURATION: 92 % | SYSTOLIC BLOOD PRESSURE: 128 MMHG | TEMPERATURE: 97.6 F | BODY MASS INDEX: 47.65 KG/M2 | WEIGHT: 244 LBS | DIASTOLIC BLOOD PRESSURE: 79 MMHG

## 2022-06-21 DIAGNOSIS — F25.1 SCHIZOAFFECTIVE DISORDER, DEPRESSIVE TYPE (H): ICD-10-CM

## 2022-06-21 DIAGNOSIS — F19.11 HISTORY OF SUBSTANCE ABUSE (H): ICD-10-CM

## 2022-06-21 DIAGNOSIS — E11.9 TYPE 2 DIABETES MELLITUS WITHOUT COMPLICATION, WITH LONG-TERM CURRENT USE OF INSULIN (H): Primary | ICD-10-CM

## 2022-06-21 DIAGNOSIS — Z91.51 HISTORY OF SUICIDAL BEHAVIOR: ICD-10-CM

## 2022-06-21 DIAGNOSIS — R21 RASH AND NONSPECIFIC SKIN ERUPTION: ICD-10-CM

## 2022-06-21 DIAGNOSIS — Z76.89 ESTABLISHING CARE WITH NEW DOCTOR, ENCOUNTER FOR: ICD-10-CM

## 2022-06-21 DIAGNOSIS — R60.1 GENERALIZED EDEMA: ICD-10-CM

## 2022-06-21 DIAGNOSIS — F25.0 SCHIZOAFFECTIVE DISORDER, BIPOLAR TYPE (H): ICD-10-CM

## 2022-06-21 DIAGNOSIS — Z23 ENCOUNTER FOR IMMUNIZATION: ICD-10-CM

## 2022-06-21 DIAGNOSIS — Z79.4 TYPE 2 DIABETES MELLITUS WITHOUT COMPLICATION, WITH LONG-TERM CURRENT USE OF INSULIN (H): Primary | ICD-10-CM

## 2022-06-21 DIAGNOSIS — K58.9 IRRITABLE BOWEL SYNDROME, UNSPECIFIED TYPE: ICD-10-CM

## 2022-06-21 DIAGNOSIS — E11.65 TYPE 2 DIABETES MELLITUS WITH HYPERGLYCEMIA, WITH LONG-TERM CURRENT USE OF INSULIN (H): Primary | ICD-10-CM

## 2022-06-21 DIAGNOSIS — Z55.0 ILLITERATE: ICD-10-CM

## 2022-06-21 DIAGNOSIS — R44.0 VERBAL AUDITORY HALLUCINATION: ICD-10-CM

## 2022-06-21 DIAGNOSIS — Z79.4 TYPE 2 DIABETES MELLITUS WITH HYPERGLYCEMIA, WITH LONG-TERM CURRENT USE OF INSULIN (H): Primary | ICD-10-CM

## 2022-06-21 DIAGNOSIS — Z23 NEED FOR VACCINATION: ICD-10-CM

## 2022-06-21 DIAGNOSIS — Z78.0 ASYMPTOMATIC POSTMENOPAUSAL STATUS: ICD-10-CM

## 2022-06-21 DIAGNOSIS — E78.5 HYPERLIPIDEMIA LDL GOAL <100: ICD-10-CM

## 2022-06-21 DIAGNOSIS — E66.01 MORBID OBESITY (H): ICD-10-CM

## 2022-06-21 DIAGNOSIS — I10 HTN, GOAL BELOW 140/90: ICD-10-CM

## 2022-06-21 DIAGNOSIS — Z91.89 HAS POORLY BALANCED DIET: ICD-10-CM

## 2022-06-21 DIAGNOSIS — N39.46 MIXED STRESS AND URGE URINARY INCONTINENCE: ICD-10-CM

## 2022-06-21 LAB
BASOPHILS # BLD AUTO: 0 10E3/UL (ref 0–0.2)
BASOPHILS NFR BLD AUTO: 1 %
EOSINOPHIL # BLD AUTO: 0.2 10E3/UL (ref 0–0.7)
EOSINOPHIL NFR BLD AUTO: 4 %
ERYTHROCYTE [DISTWIDTH] IN BLOOD BY AUTOMATED COUNT: 13 % (ref 10–15)
HBA1C MFR BLD: 8 % (ref 0–5.6)
HCT VFR BLD AUTO: 32.2 % (ref 35–47)
HGB BLD-MCNC: 10.4 G/DL (ref 11.7–15.7)
LYMPHOCYTES # BLD AUTO: 1.9 10E3/UL (ref 0.8–5.3)
LYMPHOCYTES NFR BLD AUTO: 35 %
MCH RBC QN AUTO: 31.9 PG (ref 26.5–33)
MCHC RBC AUTO-ENTMCNC: 32.3 G/DL (ref 31.5–36.5)
MCV RBC AUTO: 99 FL (ref 78–100)
MONOCYTES # BLD AUTO: 0.9 10E3/UL (ref 0–1.3)
MONOCYTES NFR BLD AUTO: 16 %
NEUTROPHILS # BLD AUTO: 2.4 10E3/UL (ref 1.6–8.3)
NEUTROPHILS NFR BLD AUTO: 45 %
PLATELET # BLD AUTO: 206 10E3/UL (ref 150–450)
RBC # BLD AUTO: 3.26 10E6/UL (ref 3.8–5.2)
WBC # BLD AUTO: 5.5 10E3/UL (ref 4–11)

## 2022-06-21 PROCEDURE — 36415 COLL VENOUS BLD VENIPUNCTURE: CPT | Performed by: NURSE PRACTITIONER

## 2022-06-21 PROCEDURE — 0064A COVID-19,PF,MODERNA (18+ YRS BOOSTER .25ML): CPT | Performed by: NURSE PRACTITIONER

## 2022-06-21 PROCEDURE — 99606 MTMS BY PHARM EST 15 MIN: CPT | Performed by: PHARMACIST

## 2022-06-21 PROCEDURE — 91306 COVID-19,PF,MODERNA (18+ YRS BOOSTER .25ML): CPT | Performed by: NURSE PRACTITIONER

## 2022-06-21 PROCEDURE — 80061 LIPID PANEL: CPT | Performed by: NURSE PRACTITIONER

## 2022-06-21 PROCEDURE — 99215 OFFICE O/P EST HI 40 MIN: CPT | Mod: 25 | Performed by: NURSE PRACTITIONER

## 2022-06-21 PROCEDURE — 83036 HEMOGLOBIN GLYCOSYLATED A1C: CPT | Performed by: NURSE PRACTITIONER

## 2022-06-21 PROCEDURE — 90677 PCV20 VACCINE IM: CPT | Performed by: NURSE PRACTITIONER

## 2022-06-21 PROCEDURE — 90471 IMMUNIZATION ADMIN: CPT | Performed by: NURSE PRACTITIONER

## 2022-06-21 PROCEDURE — 80050 GENERAL HEALTH PANEL: CPT | Performed by: NURSE PRACTITIONER

## 2022-06-21 RX ORDER — CLONAZEPAM 0.5 MG/1
0.5 TABLET ORAL AT BEDTIME
Qty: 30 TABLET | Refills: 0 | Status: SHIPPED | OUTPATIENT
Start: 2022-06-21 | End: 2022-07-20

## 2022-06-21 RX ORDER — NYSTATIN 100000 [USP'U]/G
POWDER TOPICAL 2 TIMES DAILY PRN
Qty: 45 G | Refills: 1 | Status: SHIPPED | OUTPATIENT
Start: 2022-06-21 | End: 2023-12-11

## 2022-06-21 RX ORDER — DULAGLUTIDE 3 MG/.5ML
3 INJECTION, SOLUTION SUBCUTANEOUS
Qty: 2 ML | Refills: 3 | Status: SHIPPED | OUTPATIENT
Start: 2022-06-21 | End: 2022-10-18

## 2022-06-21 SDOH — EDUCATIONAL SECURITY - EDUCATION ATTAINMENT: ILITERACY AND LOW LEVEL LITERACY: Z55.0

## 2022-06-21 ASSESSMENT — PATIENT HEALTH QUESTIONNAIRE - PHQ9
SUM OF ALL RESPONSES TO PHQ QUESTIONS 1-9: 15
SUM OF ALL RESPONSES TO PHQ QUESTIONS 1-9: 15
10. IF YOU CHECKED OFF ANY PROBLEMS, HOW DIFFICULT HAVE THESE PROBLEMS MADE IT FOR YOU TO DO YOUR WORK, TAKE CARE OF THINGS AT HOME, OR GET ALONG WITH OTHER PEOPLE: VERY DIFFICULT

## 2022-06-21 ASSESSMENT — ANXIETY QUESTIONNAIRES
GAD7 TOTAL SCORE: 17
2. NOT BEING ABLE TO STOP OR CONTROL WORRYING: NEARLY EVERY DAY
7. FEELING AFRAID AS IF SOMETHING AWFUL MIGHT HAPPEN: NOT AT ALL
3. WORRYING TOO MUCH ABOUT DIFFERENT THINGS: NEARLY EVERY DAY
5. BEING SO RESTLESS THAT IT IS HARD TO SIT STILL: NEARLY EVERY DAY
GAD7 TOTAL SCORE: 17
4. TROUBLE RELAXING: NEARLY EVERY DAY
7. FEELING AFRAID AS IF SOMETHING AWFUL MIGHT HAPPEN: NOT AT ALL
6. BECOMING EASILY ANNOYED OR IRRITABLE: NEARLY EVERY DAY
1. FEELING NERVOUS, ANXIOUS, OR ON EDGE: MORE THAN HALF THE DAYS
GAD7 TOTAL SCORE: 17
8. IF YOU CHECKED OFF ANY PROBLEMS, HOW DIFFICULT HAVE THESE MADE IT FOR YOU TO DO YOUR WORK, TAKE CARE OF THINGS AT HOME, OR GET ALONG WITH OTHER PEOPLE?: SOMEWHAT DIFFICULT

## 2022-06-21 NOTE — PROGRESS NOTES
Assessment & Plan       ICD-10-CM    1. Type 2 diabetes mellitus without complication, with long-term current use of insulin (H)  E11.9 HEMOGLOBIN A1C    Z79.4    2. Morbid obesity (H)  E66.01 Lipid panel reflex to direct LDL Fasting     Comprehensive metabolic panel (BMP + Alb, Alk Phos, ALT, AST, Total. Bili, TP)     TSH with free T4 reflex     Comprehensive Weight Management   3. Schizoaffective disorder, depressive type (H)  F25.1 Comprehensive metabolic panel (BMP + Alb, Alk Phos, ALT, AST, Total. Bili, TP)     Adult Mental Health  Referral     TSH with free T4 reflex   4. Rash and nonspecific skin eruption  R21 nystatin (MYCOSTATIN) 100843 UNIT/GM external powder   5. Need for vaccination  Z23 Pneumococcal 20 Valent Conjugate (Prevnar 20)     COVID-19,PF,MODERNA (18+ YRS BOOSTER .25ML)   6. Verbal auditory hallucination  R44.0 Adult Mental Health  Referral   7. Hyperlipidemia LDL goal <100  E78.5 Lipid panel reflex to direct LDL Fasting   8. HTN, goal below 140/90  I10    9. Irritable bowel syndrome, unspecified type  K58.9    10. Mixed stress and urge urinary incontinence  N39.46 Incontinence Supplies Order for DME - ONLY FOR DME   11. Asymptomatic postmenopausal status  Z78.0 DEXA HIP/PELVIS/SPINE - Future   12. Generalized edema  R60.1 Comprehensive metabolic panel (BMP + Alb, Alk Phos, ALT, AST, Total. Bili, TP)   13. Has poorly balanced diet  Z91.89 Comprehensive metabolic panel (BMP + Alb, Alk Phos, ALT, AST, Total. Bili, TP)     CBC with platelets and differential   14. Establishing care with new doctor, encounter for  Z76.89    15. Illiterate  Z55.0 Adult Mental Health  Referral   16. History of suicidal behavior  Z91.51 Adult Mental Health  Referral   17. History of substance abuse (H)  F19.11 Adult Mental Health  Referral   established care today, complex history known well to MTM for co-visit today    Reports doing well on current dosages, refilled  klonazepam and referred to Psych for medication review, lab work done today pt on invega and seroquel, hydroxyzine, no tics or side effects noted     Scant gen edema likely would improve with less sodium and fast food    Agrees to try wt loss clinic, also visit with MTM today see their note for further details     staff with today very helpful, doing okay without her walker with minimal assist, continue PT     Form/order signed for Diabetic shoes      47 minutes spent on the date of the encounter doing chart review, history and exam, documentation and further activities per the note       BMI:   Estimated body mass index is 47.65 kg/m  as calculated from the following:    Height as of 4/8/22: 1.524 m (5').    Weight as of this encounter: 110.7 kg (244 lb).   Weight management plan: Patient referred to endocrine and/or weight management specialty Discussed healthy diet and exercise guidelines  Blood sugar testing frequency justification:  Uncontrolled diabetes, Adjustment of medication(s) and Risk of hypoglycemia with medication(s)  Work on weight loss  Regular exercise  See Patient Instructions    Return in about 4 weeks (around 7/19/2022) for for follow up visit, earlier if any concerns.    ART Baumann CNP  [facility]    Surjit Barrett is a 62 year old accompanied by her  staff helping with history regarding her diet and activity., presenting for the following health issues:  Establish Care      History of Present Illness       Back Pain:  She presents for follow up of back pain. Patient's back pain is a recurring problem.  Location of back pain:  Right middle of back and left middle of back  Description of back pain: cramping, dull ache and sharp  Back pain spreads: nowhere    Since patient first noticed back pain, pain is: always present, but gets better and worse  Does back pain interfere with her job:  Not applicable      Mental Health Follow-up:  Patient presents  "to follow-up on Depression & Anxiety.Patient's depression since last visit has been:  No change  The patient is not having other symptoms associated with depression.  Patient's anxiety since last visit has been:  No change  The patient is not having other symptoms associated with anxiety.  Any significant life events: No  Patient is feeling anxious or having panic attacks.  Patient has no concerns about alcohol or drug use.    Diabetes:   She presents for follow up of diabetes.  She is checking home blood glucose four or more times daily. She checks blood glucose before and after meals and at bedtime.  Blood glucose is sometimes over 200 and never under 70. She is aware of hypoglycemia symptoms including shakiness, dizziness, weakness, lethargy, blurred vision and confusion. She is concerned about blood sugar frequently over 200. She is having weight gain.         Hypertension: She presents for follow up of hypertension.  She does check blood pressure  regularly outside of the clinic. Outside blood pressures have been over 140/90. She does not follow a low salt diet.     Headaches:   Since the patient's last clinic visit, headaches are: improved  The patient is getting headaches:  Once or twice a week  She is not able to do normal daily activities when she has a migraine.  The patient is taking the following rescue/relief medications:  Naproxyn (Aleve) and Tylenol   Patient states \"I get total relief\" from the rescue/relief medications.   The patient is taking the following medications to prevent migraines:  No medications to prevent migraines  In the past 4 weeks, the patient has gone to an Urgent Care or Emergency Room 0 times times due to headaches.     Today's PHQ-9         PHQ-9 Total Score: 15    PHQ-9 Q9 Thoughts of better off dead/self-harm past 2 weeks :   Not at all    How difficult have these problems made it for you to do your work, take care of things at home, or get along with other people: Very " difficult  Today's TAMIA-7 Score: 17      Answers for HPI/ROS submitted by the patient on 6/21/2022  If you checked off any problems, how difficult have these problems made it for you to do your work, take care of things at home, or get along with other people?: Very difficult  PHQ9 TOTAL SCORE: 15      Nena is coming in to establish care with Chely Aguilar, NP, as well as have a covisit with Chely Aguilar and Eugenia De Jesus.    Needs 2X Depends not 3X for urinary incontinence    Mild swelling in hands and feet, reports lot of fast food, GH cooks meals but pt gets food elsewhere, adds salt to food    BP at goal today, fasting for lipid panel    No HAs lately, mood doing well overall on current medications, never established with Psych yet, need new referral  No SI or drug use, past history     Ran out of klonopin  Night getting hagged-this has helped for her anxiety and sleep     Review of Systems   Constitutional, HEENT, cardiovascular, pulmonary, GI, , musculoskeletal, neuro, skin, endocrine and psych systems are negative, except as otherwise noted.      Objective    /79 (BP Location: Left arm, Patient Position: Sitting, Cuff Size: Adult Large)   Pulse 76   Temp 97.6  F (36.4  C) (Oral)   Wt 110.7 kg (244 lb)   LMP 01/06/2015 (Exact Date)   SpO2 92%   BMI 47.65 kg/m    Body mass index is 47.65 kg/m .  Physical Exam   GENERAL: healthy, obese, alert and no distress  EYES: Eyes grossly normal to inspection, PERRL and conjunctivae and sclerae normal  HENT: ear canals and TM's normal, nose and mouth without ulcers or lesions  NECK: no adenopathy, no asymmetry, masses, or scars and thyroid normal to palpation  RESP: lungs clear to auscultation - no rales, rhonchi or wheezes  CV: regular rate and rhythm, normal S1 S2, no S3 or S4, no murmur, click or rub, and peripheral pulses strong. Mild +1 edema present in bilateral lower extremities extending up to mid shin.   ABDOMEN: soft, nontender, no  hepatosplenomegaly, no masses and bowel sounds normal limited by obesity  MS: no gross musculoskeletal defects noted. Mild generalized weakness but able to get up on exam table by herself. Gait with small steps, some shuffling, and slow paced but steady.   NEURO: Normal strength and tone, speech is slow to respond but attentive.   Diabetic foot exam: normal DP and PT pulses, no trophic changes or ulcerative lesions and normal sensory exam. Bilateral great toenails with mild yellow color.   SKIN: Linear, pink, moist skin excoriation under bilateral breasts.   Psych:  MENTAL STATUS EXAM  Appearance: appropriate  Attitude: cooperative  Behavior: normal  Eye Contact: normal  Speech: difficult to understand at times mentation seems intact   Orientation: oriented to person , place, time and situation  Mood: states her mood has been low and anxious but better than in the past and feels she is doing well currently   Affect: Normal/bright  Thought Process: GH helping with history as needed, some cognitive deficits has good insight overall     No results found. However, due to the size of the patient record, not all encounters were searched. Please check Results Review for a complete set of results.    This patient was seen along with, Cherri Foss-student, history and review of systems obtained by student and confirmed by attending. Objective, exams, assessment and plan reviewed with attending.     Cherri Foss, SHELLIE Student           Chely CORDOVA CNP     .  ..

## 2022-06-21 NOTE — PATIENT INSTRUCTIONS
"Recommendations from today's MTM visit:                                                       1. Increase Trulicity to 3 mg once weekly on Tuesday.  2. Avoid eating sweets and fast food.  3. Receive Covid booster immunization today.    Follow-up: 1 month    It was great speaking with you today.  I value your experience and would be very thankful for your time in providing feedback in our clinic survey. In the next few days, you may receive an email or text message from [employer] with a link to a survey related to your  clinical pharmacist.\"     To schedule another MTM appointment, please call the clinic directly or you may call the MTM scheduling line at [phone] or toll-free at [phone].     My Clinical Pharmacist's contact information:                                                      Please feel free to contact me with any questions or concerns you have.      Eugenia De Jesus, PharmD, BCACP      Connor Angel  Fourth Year PharmD Student  Medication Therapy Management    "

## 2022-06-21 NOTE — PROGRESS NOTES
Medication Therapy Management (MTM) Encounter    ASSESSMENT:                            Medication Adherence/Access: No issues identified    Type 2 Diabetes: Due for A1c check. Patient is at A1c goal <8%, however per CGM time in target is now below goal of >50% in target range. Patient would benefit from increase in Trulicity dose for blood sugar control and weight loss. Decreasing consumption of fast food and sweets will also be beneficial.    Immunizations: Patient is due for second Covid booster will get in clinic    PLAN:                            1. Increase Trulicity to 3 mg once weekly on Tuesday.  2. Avoid eating sweets and fast food.  3. Receive Covid booster immunization today.    Follow-up: 1 month    SUBJECTIVE/OBJECTIVE:                          Nena Tang is a 62 year old female coming in for a follow-up visit. Patient saw provider prior to our visit today. Today's visit is a follow-up MTM visit from 3/24/22     Reason for visit: Blood sugar recheck.    Allergies/ADRs: Reviewed in chart  Past Medical History: Reviewed in chart  Tobacco: She reports that she has never smoked. She has never used smokeless tobacco.  Alcohol: None    Medication Adherence/Access: no issues reported    Type 2 Diabetes:  Currently taking Lantus 28 units twice daily, Novolog 8 units three times daily adding 2 units for every 50 mg/dL over 140 at evening meal, Trulicity 1.5 mg weekly. Patient is not experiencing side effects. Patient is interested in losing weight.  Blood sugar monitoring: Continuous Glucose Monitor. Ranges (based on glucometer readings):       Symptoms of low blood sugar? none  Symptoms of high blood sugar? none  Eye exam: up to date  Foot exam: due  Diet: Drinking coffee daily with added sugar. Generally eats at fast food restaurants for lunch. Many sweets such as cake, cheesecake, cookies, and candy.  Exercise: Not discussed.  Aspirin: Taking 81mg daily.   Statin: Yes: Atorvastatin.   ACEi/ARB: Yes:  Losartan.  Urine Albumin:   Lab Results   Component Value Date    UMALCR 9.40 07/27/2021      Lab Results   Component Value Date    A1C 7.3 03/24/2022    A1C 8.1 09/25/2021    A1C 10.0 08/10/2021    A1C 9.5 07/27/2021    A1C 9.1 12/01/2020    A1C 9.7 09/15/2020    A1C 8.6 06/30/2020    A1C 6.2 12/03/2019    A1C 6.6 08/06/2019     Immunizations: Second Covid booster is indicated.    Today's Vitals: LMP 01/06/2015 (Exact Date)    BP Readings from Last 1 Encounters:   06/21/22 128/79     Pulse Readings from Last 1 Encounters:   06/21/22 76     Wt Readings from Last 1 Encounters:   06/21/22 244 lb (110.7 kg)     Ht Readings from Last 1 Encounters:   04/08/22 5' (1.524 m)     Estimated body mass index is 47.65 kg/m  as calculated from the following:    Height as of 4/8/22: 5' (1.524 m).    Weight as of an earlier encounter on 6/21/22: 244 lb (110.7 kg).    Temp Readings from Last 1 Encounters:   06/21/22 97.6  F (36.4  C) (Oral)      ----------------    I spent 15 minutes with this patient today. All changes were made via collaborative practice agreement with Chely Aguilar. A copy of the visit note was provided to the patient's provider(s).    The patient was sent via [employer] a summary of these recommendations. See Provider note/AVS from today.     Connor Angel  Fourth Year PharmD Student  Medication Therapy Management    Eugenia De Jesus, PharmD, BCACP      Medication Therapy Recommendations  Encounter for immunization    Rationale: Preventive therapy - Needs additional medication therapy - Indication   Recommendation: Start Medication - MODERNA COVID-19 VACCINE IM   Status: Accepted per Provider         Type 2 diabetes mellitus with hyperglycemia, with long-term current use of insulin (H)    Current Medication: TRULICITY 1.5 MG/0.5ML pen (Discontinued)   Rationale: Dose too low - Dosage too low - Effectiveness   Recommendation: Increase Dose - Dulaglutide 3 MG/0.5ML Sopn   Status: Accepted per CPA

## 2022-06-22 ENCOUNTER — THERAPY VISIT (OUTPATIENT)
Dept: PHYSICAL THERAPY | Facility: CLINIC | Age: 62
End: 2022-06-22
Payer: COMMERCIAL

## 2022-06-22 DIAGNOSIS — M79.7 FIBROMYALGIA: ICD-10-CM

## 2022-06-22 DIAGNOSIS — G89.4 CHRONIC PAIN SYNDROME: Primary | ICD-10-CM

## 2022-06-22 LAB
ALBUMIN SERPL-MCNC: 3.5 G/DL (ref 3.4–5)
ALP SERPL-CCNC: 111 U/L (ref 40–150)
ALT SERPL W P-5'-P-CCNC: 23 U/L (ref 0–50)
ANION GAP SERPL CALCULATED.3IONS-SCNC: 8 MMOL/L (ref 3–14)
AST SERPL W P-5'-P-CCNC: 16 U/L (ref 0–45)
BILIRUB SERPL-MCNC: 0.3 MG/DL (ref 0.2–1.3)
BUN SERPL-MCNC: 25 MG/DL (ref 7–30)
CALCIUM SERPL-MCNC: 9 MG/DL (ref 8.5–10.1)
CHLORIDE BLD-SCNC: 110 MMOL/L (ref 94–109)
CHOLEST SERPL-MCNC: 151 MG/DL
CO2 SERPL-SCNC: 20 MMOL/L (ref 20–32)
CREAT SERPL-MCNC: 1.31 MG/DL (ref 0.52–1.04)
FASTING STATUS PATIENT QL REPORTED: YES
GFR SERPL CREATININE-BSD FRML MDRD: 46 ML/MIN/1.73M2
GLUCOSE BLD-MCNC: 235 MG/DL (ref 70–99)
HDLC SERPL-MCNC: 51 MG/DL
LDLC SERPL CALC-MCNC: 85 MG/DL
NONHDLC SERPL-MCNC: 100 MG/DL
POTASSIUM BLD-SCNC: 4.7 MMOL/L (ref 3.4–5.3)
PROT SERPL-MCNC: 7.6 G/DL (ref 6.8–8.8)
SODIUM SERPL-SCNC: 138 MMOL/L (ref 133–144)
TRIGL SERPL-MCNC: 73 MG/DL
TSH SERPL DL<=0.005 MIU/L-ACNC: 2.46 MU/L (ref 0.4–4)

## 2022-06-22 PROCEDURE — 97110 THERAPEUTIC EXERCISES: CPT | Mod: GP | Performed by: PHYSICAL THERAPIST

## 2022-06-23 ENCOUNTER — TELEPHONE (OUTPATIENT)
Dept: FAMILY MEDICINE | Facility: CLINIC | Age: 62
End: 2022-06-23

## 2022-06-23 NOTE — TELEPHONE ENCOUNTER
Chely,    Informed Amal per below and sent copy of note to Caity per her request.    She wanted to update you that pt is very noncompliant with diet even with very determined staff to daily encouraged diet changes.    She says it is almost like pt is self sabotaging. She also say pt doesn't let people know these things unless you are around her a lot to  on them.    Will go out with friend to [employer] and come home with BGs in near 400. She will not stop drinking her coffee with sugar.    Also BP has been all over the place per Amal. If they restrict salt at home she will go out and bye salt.    She will continue to encourage/advise, just wanted you to be aware.    Please advise.  Thanks,  Norma Hathaway RN

## 2022-06-23 NOTE — TELEPHONE ENCOUNTER
Left message at home- DAV[facility] per info in demographics.      Norma Xiong RN   6/22/2022 10:51 AM CDT Back to Top        Left vm with Edinson to please call us back to relay Chely's msg re labs . Attempted pt number but no answer/no vm      Cherri Xiong, DAVIN  [facility]

## 2022-06-23 NOTE — TELEPHONE ENCOUNTER
----- Message from ART Baumann CNP sent at 6/22/2022 10:31 AM CDT -----  (Eugenia SORENSEN only)  RN: Pt with cognitive deficits and I forgot to ask who we go over results with, so could you please call to check rather then me sending the [employer] message to her/  staff or put the following in a letter for them:    You thryroid is at goal. Lipids good so continue current doses.     I am concerned about your kidney function getting worse, and the anemia as well:  Working on wt loss and Diabetes management, no sugar in your coffee and those things will help turn this around again. No NSAIDs or other things that harm your kidneys, stay hydrated each day, and work on cutting out the salt in your diet.     Please schedule a follow up appointment in a month if you haven't yet, or you can see Eugenia every month and me every other.    Thanks,   Chely CORDOVA CNP

## 2022-06-23 NOTE — LETTER
June 30, 2022      Nena Tang  [address] [place] [address]              To Whom it May Concern,        I have discontinued Naproxen from Nena's medication regimen due to lowered kidney function/disease. If you have questions please feel free to contact.            Sincerely,          ART Bellamy CNP

## 2022-06-27 ENCOUNTER — TELEPHONE (OUTPATIENT)
Dept: FAMILY MEDICINE | Facility: CLINIC | Age: 62
End: 2022-06-27

## 2022-06-27 NOTE — TELEPHONE ENCOUNTER
Noted, and Eugenia and I figure she needs a lot of behavioral health support for her mental and physical health interplay.    The wt loss clinic has health coaches and mental health addressed, so do have them schedule with the wt loss clinic if haven't yet    Thanks,   Chely CORDOVA CNP

## 2022-06-27 NOTE — TELEPHONE ENCOUNTER
Left msg with Amal at assisted living.     If calls back please provide weigh mngmt referral information to schedule/follow-up with:    [facility]   [address]   [address]th [address]   [facility] [address]   Phone: [phone]     Cherri Xiong RN  [place]

## 2022-06-27 NOTE — TELEPHONE ENCOUNTER
Chely    See pt message  Letter cued for your review/revision and signature if you agree    Will need to be faxed to pt residence    Richelle Tucker RN   [facility]

## 2022-06-27 NOTE — LETTER
[facility]  [address] [place] [address] [address]  [address]  [facility] [address]  [phone]          June 27, 2022    RE: Nena Tang                                                                                                                     [address] [place] [address]            To whom it may concern,      Discontinue all Nsaids, including medications such as  Naproxyn, Ibuprofen. I have removed this off her medication list, and please schedule a follow up visit for pain and to discuss options with either myself or Eugenia [facility] Pharmacist.           Sincerely,         Chely Aguilar CNP     [place]

## 2022-06-27 NOTE — TELEPHONE ENCOUNTER
Needs order to discontinue all Nsaids. Please fax to amal at [phone]      Please send substitution for PRN pain as patient has bee requesting naproxen (NAPROSYN) 375 MG tablet daily

## 2022-06-28 DIAGNOSIS — I10 HTN, GOAL BELOW 140/90: ICD-10-CM

## 2022-06-28 DIAGNOSIS — R30.0 DYSURIA: ICD-10-CM

## 2022-06-28 DIAGNOSIS — I25.119 CORONARY ARTERY DISEASE INVOLVING NATIVE HEART WITH ANGINA PECTORIS, UNSPECIFIED VESSEL OR LESION TYPE (H): ICD-10-CM

## 2022-06-28 RX ORDER — METOPROLOL SUCCINATE 50 MG/1
TABLET, EXTENDED RELEASE ORAL
Qty: 120 TABLET | Refills: 3 | Status: SHIPPED | OUTPATIENT
Start: 2022-06-28 | End: 2022-11-15

## 2022-06-28 NOTE — TELEPHONE ENCOUNTER
Patient is scheduled for follow-up MTM in 1 month, working with PT on chronic pain.  Will plan to follow-up as scheduled or sooner as needed per patient request.    Eugenia De Jesus, KiD, BCACP

## 2022-06-28 NOTE — TELEPHONE ENCOUNTER
"Requested Prescriptions   Pending Prescriptions Disp Refills     aspirin (ASA) 81 MG EC tablet 90 tablet 1     Sig: TAKE 1 TABLET (81MG) BY MOUTH DAILY       Analgesics (Non-Narcotic Tylenol and ASA Only) Passed - 6/28/2022  5:02 PM        Passed - Recent (12 mo) or future (30 days) visit within the authorizing provider's specialty     Patient has had an office visit with the authorizing provider or a provider within the authorizing providers department within the previous 12 mos or has a future within next 30 days. See \"Patient Info\" tab in inbasket, or \"Choose Columns\" in Meds & Orders section of the refill encounter.              Passed - Patient is age 20 years or older     If ASA is flagged for ages under 20 years old. Forward to provider for confirmation Ryes Syndrome is not a concern.              Passed - Medication is active on med list           metoprolol succinate ER (TOPROL XL) 50 MG 24 hr tablet 120 tablet 3     Sig: Take 50mg in the morning and 100mg at night       Beta-Blockers Protocol Passed - 6/28/2022  5:02 PM        Passed - Blood pressure under 140/90 in past 12 months     BP Readings from Last 3 Encounters:   06/21/22 128/79   05/09/22 132/82   03/24/22 (!) 153/72                 Passed - Patient is age 6 or older        Passed - Recent (12 mo) or future (30 days) visit within the authorizing provider's specialty     Patient has had an office visit with the authorizing provider or a provider within the authorizing providers department within the previous 12 mos or has a future within next 30 days. See \"Patient Info\" tab in inbasket, or \"Choose Columns\" in Meds & Orders section of the refill encounter.              Passed - Medication is active on med list           Refilled per protocol.  Kenzie MURPHY RN    "

## 2022-06-28 NOTE — TELEPHONE ENCOUNTER
Letter signed, please send as requested otherwise it's in mychart for them  I have taken NSAIDs off her medication list, and please have her schedule a follow up pain visit with me or Eugenia WEISS pharmacist, can be virtual if they can do it that way.     Also since she recently established with me, I'd luigi to have  staff ask what is causing her pain each time she requests pain meds, so we know if new problem or not--or if a chronic condition could be worsening, then needs appointment any time that comes up as it may need more work-up.    Other option is referring to a pain clinic for long term management.     Thanks,   Chely CORDOVA CNP

## 2022-06-28 NOTE — TELEPHONE ENCOUNTER
Left vm on Amal to call back regrading Chely's message below    Cherri Xiong RN  [facility]

## 2022-06-29 RX ORDER — ASCORBIC ACID 500 MG
TABLET ORAL
Qty: 112 TABLET | Refills: 1 | Status: SHIPPED | OUTPATIENT
Start: 2022-06-29 | End: 2022-08-25

## 2022-06-29 NOTE — TELEPHONE ENCOUNTER
If pt is her own decision maker, I cannot restrict her and neither can the staff there. We can only  her and get her support as we recommended during her visit. Please have her schedule an appointment for anything further and we can check in.     The order was already canceled and letter signed, see that encounter and send them med list or whatever they are needing from us please.     Thanks,   Chely CORDOVA CNP

## 2022-06-29 NOTE — TELEPHONE ENCOUNTER
Chely - please put in order to discontinue order  the neproxin - Fax to [phone]    Spoke to Edinson and provided [facility] weight mngmt contact info - will follow up to schedule    Edinson reported concerns over her high BP, she is eating too much salt, extremely noncompliant with diet, hiding food. Not taking insulin per slidding scale during meal time when away from home. Worries it will get to a point she will need to be hospitalized    Nena is planning to travel out of [facility] end July -early August - Edinson doesn't think she is a good candidate to fly due to health and the food environment she will around.     Please call Edinson with questions    Cherri Xiong RN  [facility]

## 2022-06-30 ENCOUNTER — THERAPY VISIT (OUTPATIENT)
Dept: PHYSICAL THERAPY | Facility: CLINIC | Age: 62
End: 2022-06-30
Payer: COMMERCIAL

## 2022-06-30 DIAGNOSIS — G89.4 CHRONIC PAIN SYNDROME: Primary | ICD-10-CM

## 2022-06-30 DIAGNOSIS — M79.7 FIBROMYALGIA: ICD-10-CM

## 2022-06-30 PROCEDURE — 97110 THERAPEUTIC EXERCISES: CPT | Mod: GP | Performed by: PHYSICAL THERAPIST

## 2022-06-30 NOTE — TELEPHONE ENCOUNTER
Edinson Mo needs a letter signed to submit to [facility] for the discontinuation. I have pended one here and printed one (in your hanging file) for your review/sugnature.     Apologies for the extra steps.     Cherri Xiong RN  [facility]

## 2022-07-03 NOTE — PROGRESS NOTES
"      Adult Mental Health Outpatient Group Therapy Progress Note         Client Initial Individualized Goals for Treatment:       \" Get help for my anxiety and depression.\"      See Initial Treatment suggestions for the client during the time between Diagnostic Assessment and completion of the Master Individualized Treatment Plan:      Treatment Goals:      1.Client will notify staff when needing assistance to develop or implement a coping plan to manage suicidal or self injurious urges.  2. When in life skills client will learn and practice 1-2 skills to help with better management of stress providing an update of weekly progress.  3. Will report on symptoms and identify skills to use to manage depression, anxiety levels, panic attacks, reduce and recognize Psychosis, and not respond to voices that tell her to harm herself.   4. Will develop an aftercare plan by scheduling an appointment and establishing care with a new psychiatrist.      Area of Treatment Focus:  Symptom Management      Therapeutic Interventions/Treatment Strategies:  Support, Safety Assessments, Problem Solving, Clarification and Education      Response to Treatment Strategies:  Accepted Feedback, Attentive and Alert      Name of Group: 4C Group Therapy and Mental Health Management      Description and Outcome:      The client reported being safe today. The group discussed different areas of their lives, and how they are able to care for themselves, get out in the community, and their feelings for the past few days.   The following was reported:     Sleep: She didn't use her C Pap machine  Medication: As prescribed  .  Concentration: Watching TV, listening to music  Appetite: Ok,  Interest level and activities: She has been talking to relatives about a  in [place] on the weekend, and trying to arrange    Rides to get there. She has been trying to manage her heart monitor.  Social contacts: Staff, house-mates, relatives  Feelings: " Pt reevaluated by dr. Kandy Hayes, pt and his brother both informed of pt's plan of care and discharge.  Verbalizing understanding Depression, grief and loss, and a family friend  over the past weekend.      Psychosis: She has a visual hallucination of a shadow man, and he comes at night. She keeps the light on and   When he appears, she yells at him.  She reported being scared of him.      Skills used: She is practicing assertiveness with others, keeping appointments, resting, talking to others on the phone            Is this a Weekly Review of the Progress on the Treatment Plan?  Yes.       Are Treatment Plan Goals being addressed?  Yes, continue treatment goals          Are Treatment Plan Strategies to Address Goals Effective?  Yes, continue treatment strategies          Are there any current contracts in place?  No

## 2022-07-08 ENCOUNTER — THERAPY VISIT (OUTPATIENT)
Dept: PHYSICAL THERAPY | Facility: CLINIC | Age: 62
End: 2022-07-08
Payer: COMMERCIAL

## 2022-07-08 DIAGNOSIS — M79.7 FIBROMYALGIA: ICD-10-CM

## 2022-07-08 DIAGNOSIS — G89.4 CHRONIC PAIN SYNDROME: Primary | ICD-10-CM

## 2022-07-08 PROCEDURE — 97110 THERAPEUTIC EXERCISES: CPT | Mod: GP | Performed by: PHYSICAL THERAPIST

## 2022-07-14 ENCOUNTER — ANCILLARY PROCEDURE (OUTPATIENT)
Dept: BONE DENSITY | Facility: CLINIC | Age: 62
End: 2022-07-14
Attending: NURSE PRACTITIONER
Payer: COMMERCIAL

## 2022-07-14 DIAGNOSIS — Z78.0 ASYMPTOMATIC POSTMENOPAUSAL STATUS: ICD-10-CM

## 2022-07-14 PROCEDURE — 77080 DXA BONE DENSITY AXIAL: CPT | Performed by: RADIOLOGY

## 2022-07-18 ENCOUNTER — THERAPY VISIT (OUTPATIENT)
Dept: PHYSICAL THERAPY | Facility: CLINIC | Age: 62
End: 2022-07-18
Payer: COMMERCIAL

## 2022-07-18 DIAGNOSIS — M79.7 FIBROMYALGIA: ICD-10-CM

## 2022-07-18 DIAGNOSIS — G89.4 CHRONIC PAIN SYNDROME: Primary | ICD-10-CM

## 2022-07-18 PROCEDURE — 97110 THERAPEUTIC EXERCISES: CPT | Mod: GP | Performed by: PHYSICAL THERAPIST

## 2022-07-19 DIAGNOSIS — E78.5 HYPERLIPIDEMIA LDL GOAL <100: ICD-10-CM

## 2022-07-19 DIAGNOSIS — F25.0 SCHIZOAFFECTIVE DISORDER, BIPOLAR TYPE (H): ICD-10-CM

## 2022-07-19 DIAGNOSIS — F25.1 SCHIZOAFFECTIVE DISORDER, DEPRESSIVE TYPE (H): ICD-10-CM

## 2022-07-19 RX ORDER — ATORVASTATIN CALCIUM 80 MG/1
TABLET, FILM COATED ORAL
Qty: 30 TABLET | Refills: 11 | Status: SHIPPED | OUTPATIENT
Start: 2022-07-19 | End: 2023-06-21

## 2022-07-19 NOTE — TELEPHONE ENCOUNTER
"Requested Prescriptions   Pending Prescriptions Disp Refills     atorvastatin (LIPITOR) 80 MG tablet 30 tablet 11     Sig: TAKE 1 TABLET (80MG) BY MOUTH DAILY       Statins Protocol Passed - 7/19/2022  4:06 PM        Passed - LDL on file in past 12 months     Recent Labs   Lab Test 06/21/22  1116   LDL 85             Passed - No abnormal creatine kinase in past 12 months     Recent Labs   Lab Test 09/26/21  0803                   Passed - Recent (12 mo) or future (30 days) visit within the authorizing provider's specialty     Patient has had an office visit with the authorizing provider or a provider within the authorizing providers department within the previous 12 mos or has a future within next 30 days. See \"Patient Info\" tab in inbasket, or \"Choose Columns\" in Meds & Orders section of the refill encounter.              Passed - Medication is active on med list        Passed - Patient is age 18 or older        Passed - No active pregnancy on record        Passed - No positive pregnancy test in past 12 months           amantadine (SYMMETREL) 100 MG capsule 84 capsule 3       There is no refill protocol information for this order        clonazePAM (KLONOPIN) 0.5 MG tablet 30 tablet 0     Sig: Take 1 tablet (0.5 mg) by mouth At Bedtime       There is no refill protocol information for this order        Has appt .    Kenzie MURPHY RN      "

## 2022-07-20 ENCOUNTER — OFFICE VISIT (OUTPATIENT)
Dept: PHARMACY | Facility: CLINIC | Age: 62
End: 2022-07-20
Payer: COMMERCIAL

## 2022-07-20 ENCOUNTER — VIRTUAL VISIT (OUTPATIENT)
Dept: UROLOGY | Facility: CLINIC | Age: 62
End: 2022-07-20
Payer: COMMERCIAL

## 2022-07-20 VITALS
DIASTOLIC BLOOD PRESSURE: 76 MMHG | OXYGEN SATURATION: 94 % | HEIGHT: 61 IN | WEIGHT: 243 LBS | RESPIRATION RATE: 18 BRPM | BODY MASS INDEX: 45.88 KG/M2 | SYSTOLIC BLOOD PRESSURE: 130 MMHG | TEMPERATURE: 97 F | HEART RATE: 75 BPM

## 2022-07-20 VITALS — BODY MASS INDEX: 47.71 KG/M2 | WEIGHT: 243 LBS | HEIGHT: 60 IN

## 2022-07-20 DIAGNOSIS — M81.0 OSTEOPOROSIS WITHOUT CURRENT PATHOLOGICAL FRACTURE, UNSPECIFIED OSTEOPOROSIS TYPE: Primary | ICD-10-CM

## 2022-07-20 DIAGNOSIS — E11.3299 TYPE 2 DIABETES MELLITUS WITH MILD NONPROLIFERATIVE RETINOPATHY WITHOUT MACULAR EDEMA, WITH LONG-TERM CURRENT USE OF INSULIN, UNSPECIFIED LATERALITY (H): ICD-10-CM

## 2022-07-20 DIAGNOSIS — E11.3299 TYPE 2 DIABETES MELLITUS WITH MILD NONPROLIFERATIVE RETINOPATHY WITHOUT MACULAR EDEMA, WITH LONG-TERM CURRENT USE OF INSULIN, UNSPECIFIED LATERALITY (H): Primary | ICD-10-CM

## 2022-07-20 DIAGNOSIS — Z79.4 TYPE 2 DIABETES MELLITUS WITH MILD NONPROLIFERATIVE RETINOPATHY WITHOUT MACULAR EDEMA, WITH LONG-TERM CURRENT USE OF INSULIN, UNSPECIFIED LATERALITY (H): Primary | ICD-10-CM

## 2022-07-20 DIAGNOSIS — Z79.4 TYPE 2 DIABETES MELLITUS WITH MILD NONPROLIFERATIVE RETINOPATHY WITHOUT MACULAR EDEMA, WITH LONG-TERM CURRENT USE OF INSULIN, UNSPECIFIED LATERALITY (H): ICD-10-CM

## 2022-07-20 DIAGNOSIS — R35.0 URINARY FREQUENCY: Primary | ICD-10-CM

## 2022-07-20 DIAGNOSIS — F25.1 SCHIZOAFFECTIVE DISORDER, DEPRESSIVE TYPE (H): ICD-10-CM

## 2022-07-20 PROCEDURE — 99606 MTMS BY PHARM EST 15 MIN: CPT | Performed by: PHARMACIST

## 2022-07-20 PROCEDURE — 99213 OFFICE O/P EST LOW 20 MIN: CPT | Mod: 95 | Performed by: PHYSICIAN ASSISTANT

## 2022-07-20 PROCEDURE — 99607 MTMS BY PHARM ADDL 15 MIN: CPT | Performed by: PHARMACIST

## 2022-07-20 RX ORDER — INSULIN LISPRO 100 [IU]/ML
INJECTION, SOLUTION INTRAVENOUS; SUBCUTANEOUS
Qty: 9 ML | Refills: 4 | Status: CANCELLED | OUTPATIENT
Start: 2022-07-20

## 2022-07-20 RX ORDER — AMANTADINE HYDROCHLORIDE 100 MG/1
CAPSULE, GELATIN COATED ORAL
Qty: 84 CAPSULE | Refills: 3 | Status: CANCELLED | OUTPATIENT
Start: 2022-07-20

## 2022-07-20 RX ORDER — ALENDRONATE SODIUM 70 MG/1
70 TABLET ORAL
Qty: 12 TABLET | Refills: 3 | Status: SHIPPED | OUTPATIENT
Start: 2022-07-20 | End: 2023-06-22

## 2022-07-20 RX ORDER — CLONAZEPAM 0.5 MG/1
0.5 TABLET ORAL AT BEDTIME
Qty: 30 TABLET | Refills: 0 | Status: SHIPPED | OUTPATIENT
Start: 2022-07-20 | End: 2022-08-17

## 2022-07-20 RX ORDER — INSULIN ASPART 100 [IU]/ML
INJECTION, SOLUTION INTRAVENOUS; SUBCUTANEOUS
Qty: 30 ML | Refills: 1 | Status: SHIPPED | OUTPATIENT
Start: 2022-07-20 | End: 2022-07-22 | Stop reason: ALTCHOICE

## 2022-07-20 RX ORDER — AMANTADINE HYDROCHLORIDE 100 MG/1
CAPSULE, GELATIN COATED ORAL
Qty: 84 CAPSULE | Refills: 3 | Status: SHIPPED | OUTPATIENT
Start: 2022-07-20 | End: 2022-11-18

## 2022-07-20 ASSESSMENT — PAIN SCALES - GENERAL
PAINLEVEL: SEVERE PAIN (7)
PAINLEVEL: SEVERE PAIN (7)

## 2022-07-20 NOTE — PROGRESS NOTES
"PT STATES SHE IS DOING OK    Nena is a 62 year old who is being evaluated via a billable telephone visit.      What phone number would you like to be contacted at?   [phone]  How would you like to obtain your AVS? Flores    Phone call duration: 6 minutes      CC: dysuria, leakage    HPI:  Nena \"Ania\" Kitty is a pleasant 62 year old female who presents in follow-up of the above. At time of initial consult, had dysuria, which ws intermittent and ongoing for several months. Last UAs neg (2021) Wears depends. Has had leakage for several years as well as urgency and nocturia. No gross hematuria.   Started on Myrbetriq 25 mg daily. Declined topical estrogen. Wears less Depends. Has helped some.     Staff member could be heard in the background of the phone call.     Past Medical History:   Diagnosis Date     Acute respiratory failure with hypoxia (H) 9/4/2017     CAD (coronary artery disease)     5/2014 cath, nonbostructive stenosis to LAD, RCA.     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/2011     Noncompliance with medication regimen 6/8/2011     Obesity      CINDY (obstructive sleep apnea) 3/8/2012    uses CPAP     CINDY (obstructive sleep apnea)- mild AHI 10.3      Osteopenia 10/7/2009     Pneumonia of right lower lobe due to infectious organism 9/4/2017     Schizoaffective disorder, depressive type (H) 2/25/2013     Sepsis (H) 8/29/2017     Suicidal intent 10/2/2013     Takotsubo cardiomyopathy      Type 2 diabetes mellitus (H) 8/30/2011     Uterine cancer (H) 1983     Verbal auditory hallucination 10/4/2012       Past Surgical History:   Procedure Laterality Date     CATARACT IOL, RT/LT " Bilateral 2017     CHOLECYSTECTOMY       COLONOSCOPY N/A 3/16/2017    Procedure: COLONOSCOPY;  Surgeon: Traci Gonzalez MD;  [facility]: [facility]     Coronary CTA  5/21/2014     HYSTERECTOMY  1983    uterine cancer yearly pap's per provider.     HYSTERECTOMY       LAPAROSCOPIC CHOLECYSTECTOMY  2008     PHACOEMULSIFICATION CLEAR CORNEA WITH STANDARD INTRAOCULAR LENS IMPLANT Left 5/5/2017    Procedure: PHACOEMULSIFICATION CLEAR CORNEA WITH STANDARD INTRAOCULAR LENS IMPLANT;  LEFT EYE PHACOEMULSIFICATION CLEAR CORNEA WITH STANDARD INTRAOCULAR LENS IMPLANT ;  Surgeon: Tyra Diaz MD;  [facility]     PHACOEMULSIFICATION CLEAR CORNEA WITH STANDARD INTRAOCULAR LENS IMPLANT Right 6/30/2017    Procedure: PHACOEMULSIFICATION CLEAR CORNEA WITH STANDARD INTRAOCULAR LENS IMPLANT;  RIGHT EYE PHACOEMULSIFICATION CLEAR CORNEA WITH STANDARD INTRAOCULAR LENS IMPLANT;  Surgeon: Tyra Diaz MD;  Location:  [facility]     RELEASE TRIGGER FINGER  10/11/2012    Left thumb. Procedure: RELEASE TRIGGER FINGER;  LEFT THUMB TRIGGER RELEASE;  Surgeon: Tay Langley MD;  [facility]:  [facility]     RELEASE TRIGGER FINGER Right 12/26/2016    Procedure: RELEASE TRIGGER FINGER;  Surgeon: Albino Castañeda MD;  Location:  OR     [facility] OOPHORECTORMY FOR MALALEJO, W/BX  1983    UTERINE       Social History     Socioeconomic History     Marital status:      Spouse name: Not on file     Number of children: Not on file     Years of education: Not on file     Highest education level: Not on file   Occupational History     Not on file   Tobacco Use     Smoking status: Never Smoker     Smokeless tobacco: Never Used   Vaping Use     Vaping Use: Never used   Substance and Sexual Activity     Alcohol use: No     Comment: last month     Drug use: No     Comment: history of     Sexual activity: Never     Partners: Male     Birth control/protection: None, Condom   Other Topics Concern     Parent/sibling w/ CABG, MI or angioplasty before 65F  55M? Not Asked      Service No     Blood Transfusions No     Caffeine Concern No     Occupational Exposure No     Hobby Hazards No     Sleep Concern Yes     Stress Concern Yes     Weight Concern Yes     Special Diet Yes     Comment: DM     Back Care Yes     Exercise Yes     Comment: WALKS DAILY     Bike Helmet Not Asked     Seat Belt Yes     Self-Exams No     Comment: ENCOURAGED   Social History Narrative     10/2014. Has 2 sons. 7 grandchildren.         Unemployed. Graduated HS.         Tobacco use: Denies    Alcohol use: Escalated use since   10/2014    Drug: Denies     Social Determinants of Health     Financial Resource Strain: Not on file   Food Insecurity: Not on file   Transportation Needs: Not on file   Physical Activity: Not on file   Stress: Not on file   Social Connections: Not on file   Intimate Partner Violence: Not on file   Housing Stability: Not on file       Family History   Problem Relation Age of Onset     Cancer Mother         BLADDER     Respiratory Mother         COPD     Gastrointestinal Disease Mother         CIRRHOSIS OF LI BOLIVAR     Alcohol/Drug Mother      Diabetes Mother      Hypertension Mother      Lipids Mother      C.A.D. Mother      Glaucoma Mother      Alcohol/Drug Sister      Mental Illness Sister      Alcohol/Drug Sister      Psychotic Disorder Sister      Cancer Maternal Grandmother         UNKNOWN TYPE     Cancer Brother         COLON     Cancer - colorectal Brother         IN HIS LATE 30S     Alcohol/Drug Brother          OF HEROIN OVERDOSE AT AGE 22 YRS     Macular Degeneration No family hx of        Allergies   Allergen Reactions     Imidazole Antifungals Hives     Tolerates diflucan     Ketoprofen Itching     Pruritis to topical     Lisinopril Hives     Metformin Other (See Comments)     Patient hospitalized for lactic acidosis - admitting provider suspectd caused by metformin     Metronidazole Hives     Posaconazole Hives     Tolerates diflucan        Current Outpatient Medications   Medication     acetaminophen (TYLENOL) 325 MG tablet     albuterol (PROAIR HFA/PROVENTIL HFA/VENTOLIN HFA) 108 (90 Base) MCG/ACT inhaler     Alcohol Swabs (EASY TOUCH ALCOHOL PREP MEDIUM) 70 % PADS     alendronate (FOSAMAX) 70 MG tablet     amantadine (SYMMETREL) 100 MG capsule     aspirin (ASA) 81 MG EC tablet     atorvastatin (LIPITOR) 80 MG tablet     blood glucose (NO BRAND SPECIFIED) test strip     calcium carbonate (OS-ALLEN) 1500 (600 Ca) MG tablet     clonazePAM (KLONOPIN) 0.5 MG tablet     Continuous Blood Gluc Sensor (DEXCOM G6 SENSOR) MISC     Continuous Blood Gluc Transmit (DEXCOM G6 TRANSMITTER) MISC     diclofenac (VOLTAREN) 1 % topical gel     Dulaglutide (TRULICITY) 3 MG/0.5ML SOPN     escitalopram (LEXAPRO) 10 MG tablet     famotidine (PEPCID) 20 MG tablet     gabapentin (NEURONTIN) 300 MG capsule     hydrOXYzine (VISTARIL) 25 MG capsule     insulin aspart (NOVOLOG FLEXPEN) 100 UNIT/ML pen     insulin glargine (LANTUS PEN) 100 UNIT/ML pen     insulin pen needle (NOVOFINE 30) 30G X 8 MM miscellaneous     losartan (COZAAR) 100 MG tablet     metoprolol succinate ER (TOPROL XL) 50 MG 24 hr tablet     mirabegron (MYRBETRIQ) 25 MG 24 hr tablet     nystatin (MYCOSTATIN) 943821 UNIT/GM external powder     order for DME     order for DME     paliperidone ER (INVEGA) 9 MG 24 hr tablet     pantoprazole (PROTONIX) 40 MG EC tablet     QUEtiapine (SEROQUEL) 25 MG tablet     senna-docusate (SENOKOT-S/PERICOLACE) 8.6-50 MG tablet     thin (NO BRAND SPECIFIED) lancets     topiramate (TOPAMAX) 200 MG tablet     traZODone (DESYREL) 100 MG tablet     vitamin C (ASCORBIC ACID) 500 MG tablet     zinc oxide (DESITIN) 20 % external ointment     No current facility-administered medications for this visit.         PEx:   Ht 1.524 m (5')   Wt 110.2 kg (243 lb)   LMP 01/06/2015 (Exact Date)   BMI 47.46 kg/m      PSYCH: NAD    A/P: Nenasisi Tang is a 62 year old female with urge  incontinence  -UA/UC is symptoms  -Could consider Estrogen cream three times a week near urethra (pea-sized amount)recommended, but she declines at the moment.   -Myrbetriq 25 mg daily.   -follow-up PRN    [facility]      12 minutes spent on the date of the encounter doing chart review, review of outside records, review of test results, interpretation of tests, patient visit and documentation

## 2022-07-20 NOTE — LETTER
"7/20/2022       RE: Nena Tang  [address] [place] [address]     Dear Colleague,    Thank you for referring your patient, Nena Tang, to the [facility] at [facility]. Please see a copy of my visit note below.    PT STATES SHE IS DOING OK    Nena is a 62 year old who is being evaluated via a billable telephone visit.      What phone number would you like to be contacted at?   [phone]  How would you like to obtain your AVS? Prescribe [employer]    Phone call duration: 6 minutes      CC: dysuria, leakage    HPI:  Nena \"Ania\" Kitty is a pleasant 62 year old female who presents in follow-up of the above. At time of initial consult, had dysuria, which ws intermittent and ongoing for several months. Last UAs neg (2021) Wears depends. Has had leakage for several years as well as urgency and nocturia. No gross hematuria.   Started on Myrbetriq 25 mg daily. Declined topical estrogen. Wears less Depends. Has helped some.     Staff member could be heard in the background of the phone call.     Past Medical History:   Diagnosis Date     Acute respiratory failure with hypoxia (H) 9/4/2017     CAD (coronary artery disease)     5/2014 cath, nonbostructive stenosis to LAD, RCA.     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/2011     Noncompliance with medication regimen 6/8/2011     Obesity      CINDY (obstructive sleep apnea) 3/8/2012    uses CPAP     CINDY (obstructive sleep apnea)- mild AHI 10.3      Osteopenia 10/7/2009     Pneumonia of right lower lobe due to infectious organism 9/4/2017     Schizoaffective " disorder, depressive type (H) 2/25/2013     Sepsis (H) 8/29/2017     Suicidal intent 10/2/2013     Takotsubo cardiomyopathy      Type 2 diabetes mellitus (H) 8/30/2011     Uterine cancer (H) 1983     Verbal auditory hallucination 10/4/2012       Past Surgical History:   Procedure Laterality Date     CATARACT IOL, RT/LT Bilateral 2017     CHOLECYSTECTOMY       COLONOSCOPY N/A 3/16/2017    Procedure: COLONOSCOPY;  Surgeon: Traci Gonzalez MD;  Location:  [facility]     Coronary CTA  5/21/2014     HYSTERECTOMY  1983    uterine cancer yearly pap's per provider.     HYSTERECTOMY       LAPAROSCOPIC CHOLECYSTECTOMY  2008     PHACOEMULSIFICATION CLEAR CORNEA WITH STANDARD INTRAOCULAR LENS IMPLANT Left 5/5/2017    Procedure: PHACOEMULSIFICATION CLEAR CORNEA WITH STANDARD INTRAOCULAR LENS IMPLANT;  LEFT EYE PHACOEMULSIFICATION CLEAR CORNEA WITH STANDARD INTRAOCULAR LENS IMPLANT ;  Surgeon: Tyra Diaz MD;  [facility]:  [facility]     PHACOEMULSIFICATION CLEAR CORNEA WITH STANDARD INTRAOCULAR LENS IMPLANT Right 6/30/2017    Procedure: PHACOEMULSIFICATION CLEAR CORNEA WITH STANDARD INTRAOCULAR LENS IMPLANT;  RIGHT EYE PHACOEMULSIFICATION CLEAR CORNEA WITH STANDARD INTRAOCULAR LENS IMPLANT;  Surgeon: Tyra Diaz MD;  Location:  [facility]     RELEASE TRIGGER FINGER  10/11/2012    Left thumb. Procedure: RELEASE TRIGGER FINGER;  LEFT THUMB TRIGGER RELEASE;  Surgeon: Tay Langley MD;  [facility]:  [facility]     RELEASE TRIGGER FINGER Right 12/26/2016    Procedure: RELEASE TRIGGER FINGER;  Surgeon: Albino Castañeda MD;  Location: [facility] OOPHORECTORMY FOR MALIG, W/BX  1983    [place]ER[place]       Social History     Socioeconomic History     Marital status:      Spouse name: Not on file     Number of children: Not on file     Years of education: Not on file     Highest education level: Not on file   Occupational History     Not on file   Tobacco Use     Smoking status: Never Smoker     Smokeless tobacco: Never Used    Vaping Use     Vaping Use: Never used   Substance and Sexual Activity     Alcohol use: No     Comment: last month     Drug use: No     Comment: history of     Sexual activity: Never     Partners: Male     Birth control/protection: None, Condom   Other Topics Concern     Parent/sibling w/ CABG, MI or angioplasty before 65F 55M? Not Asked      Service No     Blood Transfusions No     Caffeine Concern No     Occupational Exposure No     Hobby Hazards No     Sleep Concern Yes     Stress Concern Yes     Weight Concern Yes     Special Diet Yes     Comment: DM     Back Care Yes     Exercise Yes     Comment: WALKS DAILY     Bike Helmet Not Asked     Seat Belt Yes     Self-Exams No     Comment: ENCOURAGED   Social History Narrative     10/2014. Has 2 sons. 7 grandchildren.         Unemployed. Graduated HS.         Tobacco use: Denies    Alcohol use: Escalated use since   10/2014    Drug: Denies     Social Determinants of Health     Financial Resource Strain: Not on file   Food Insecurity: Not on file   Transportation Needs: Not on file   Physical Activity: Not on file   Stress: Not on file   Social Connections: Not on file   Intimate Partner Violence: Not on file   Housing Stability: Not on file       Family History   Problem Relation Age of Onset     Cancer Mother         BLADDER     Respiratory Mother         COPD     Gastrointestinal Disease Mother         CIRRHOSIS OF LI BOLIVAR     Alcohol/Drug Mother      Diabetes Mother      Hypertension Mother      Lipids Mother      C.A.D. Mother      Glaucoma Mother      Alcohol/Drug Sister      Mental Illness Sister      Alcohol/Drug Sister      Psychotic Disorder Sister      Cancer Maternal Grandmother         UNKNOWN TYPE     Cancer Brother         COLON     Cancer - colorectal Brother         IN HIS LATE 30S     Alcohol/Drug Brother          OF HEROIN OVERDOSE AT AGE 22 YRS     Macular Degeneration No family hx of        Allergies   Allergen  Reactions     Imidazole Antifungals Hives     Tolerates diflucan     Ketoprofen Itching     Pruritis to topical     Lisinopril Hives     Metformin Other (See Comments)     Patient hospitalized for lactic acidosis - admitting provider suspectd caused by metformin     Metronidazole Hives     Posaconazole Hives     Tolerates diflucan       Current Outpatient Medications   Medication     acetaminophen (TYLENOL) 325 MG tablet     albuterol (PROAIR HFA/PROVENTIL HFA/VENTOLIN HFA) 108 (90 Base) MCG/ACT inhaler     Alcohol Swabs (EASY TOUCH ALCOHOL PREP MEDIUM) 70 % PADS     alendronate (FOSAMAX) 70 MG tablet     amantadine (SYMMETREL) 100 MG capsule     aspirin (ASA) 81 MG EC tablet     atorvastatin (LIPITOR) 80 MG tablet     blood glucose (NO BRAND SPECIFIED) test strip     calcium carbonate (OS-ALLEN) 1500 (600 Ca) MG tablet     clonazePAM (KLONOPIN) 0.5 MG tablet     Continuous Blood Gluc Sensor (DEXCOM G6 SENSOR) MISC     Continuous Blood Gluc Transmit (DEXCOM G6 TRANSMITTER) MISC     diclofenac (VOLTAREN) 1 % topical gel     Dulaglutide (TRULICITY) 3 MG/0.5ML SOPN     escitalopram (LEXAPRO) 10 MG tablet     famotidine (PEPCID) 20 MG tablet     gabapentin (NEURONTIN) 300 MG capsule     hydrOXYzine (VISTARIL) 25 MG capsule     insulin aspart (NOVOLOG FLEXPEN) 100 UNIT/ML pen     insulin glargine (LANTUS PEN) 100 UNIT/ML pen     insulin pen needle (NOVOFINE 30) 30G X 8 MM miscellaneous     losartan (COZAAR) 100 MG tablet     metoprolol succinate ER (TOPROL XL) 50 MG 24 hr tablet     mirabegron (MYRBETRIQ) 25 MG 24 hr tablet     nystatin (MYCOSTATIN) [id number] UNIT/GM external powder     order for DME     order for DME     paliperidone ER (INVEGA) 9 MG 24 hr tablet     pantoprazole (PROTONIX) 40 MG EC tablet     QUEtiapine (SEROQUEL) 25 MG tablet     senna-docusate (SENOKOT-S/PERICOLACE) 8.6-50 MG tablet     thin (NO BRAND SPECIFIED) lancets     topiramate (TOPAMAX) 200 MG tablet     traZODone (DESYREL) 100 MG tablet      vitamin C (ASCORBIC ACID) 500 MG tablet     zinc oxide (DESITIN) 20 % external ointment     No current facility-administered medications for this visit.         PEx:   Ht 1.524 m (5')   Wt 110.2 kg (243 lb)   LMP 01/06/2015 (Exact Date)   BMI 47.46 kg/m      PSYCH: NAD    A/P: Nena Tang is a 62 year old female with urge incontinence  -UA/UC is symptoms  -Could consider Estrogen cream three times a week near urethra (pea-sized amount)recommended, but she declines at the moment.   -Myrbetriq 25 mg daily.   -follow-up PRN    Gisselle Nash PA-C  [facility]rology      12 minutes spent on the date of the encounter doing chart review, review of outside records, review of test results, interpretation of tests, patient visit and documentation

## 2022-07-20 NOTE — PROGRESS NOTES
Medication Therapy Management (MTM) Encounter    ASSESSMENT:                            Medication Adherence/Access: No issues identified    Osteoporosis: Due to low T-score in hip, patient would benefit from starting medication. Discussed bisphosphonate vs parathyroid analog and patient prefers oral medication. Will supplement calcium intake as well.   She does have a history of low vitamin D which was found on chart review after visit; will plan to recheck vit D at next visit and restart supplement as needed.    Type 2 Diabetes: A1c at goal <8% but recent glucose are elevated secondary to diet. Encouraged limiting sweets and carbohydrate. Discussed addition of sliding scale for breakfast and lunch, however concerned with patient calculating dose herself without staff support while she is away from [facility] and also with upcoming vacation. Will instead raise her breakfast and lunch doses.    PLAN:                            1. Start alendronate 70mg daily  2. Start calcium 600mg daily  3. Increase Novolog to 12 units with breakfast and lunch.  Continue 8 units + sliding scale with dinner.    Follow-up: 1 month    SUBJECTIVE/OBJECTIVE:                          Nena Tang is a 62 year old female coming in for a follow-up visit. Patient was accompanied by [facility] staff. Today's visit is a follow-up MTM visit from 6/21/22     Reason for visit: medication recheck, review DEXA.    Allergies/ADRs: Reviewed in chart  Past Medical History: Reviewed in chart  Tobacco: She reports that she has never smoked. She has never used smokeless tobacco.  Alcohol: none    Medication Adherence/Access: no issues reported    Osteoporosis: Current therapy includes: none.  DEXA History: 7/14/22  Lumbar spine T-score in region of L1-L4 (L3) = -2.2   Lumbar percent change: Not applicable%      HIPS:  Mean total hip T-score: -1.5  Mean total hip percent change: -10.8%      Left femoral neck T-score = -3.1  Right femoral neck T-score=  -1.7   FRAX:  10 year probability of major osteoporotic fracture: 5.9%  10 year probability of hip fracture: 1.5%    Patient is getting the following sources of dietary calcium: yogurt, cheese and green leafy vegetables (1 serving a couple times a week of each)  Risk factors: post-menopausal  Last vitamin D level:  Lab Results   Component Value Date    VITDT 39 12/22/2019    VITDT 43 09/06/2018    VITDT 69 11/07/2017    VITDT 13 (L) 07/13/2016    VITDT (L) 04/19/2016     <13  Season, race, dietary intake, and treatment affect the concentration of   25-hydroxy-Vitamin D. Values may decrease during winter months and increase   during summer months. Values 20-29 ug/L may indicate Vitamin D insufficiency   and values <20 ug/L may indicate Vitamin D deficiency.   Vitamin D determination is routinely performed by an immunoassay specific for   25 hydroxyvitamin D3.  If an individual is on vitamin D2 (ergocalciferol)   supplementation, please specify 25 OH vitamin D2 and D3 level determination by   LCMSMS test VITD23.          Type 2 Diabetes:  Currently taking Lantus 28 units twice daily, Novolog 8 units three times daily adding 2 units for every 50 mg/dL over 140 at evening meal (last couple days, taking 16 units Novolog with dinner. She has been remembering to take her Novolog with her at [facility]-in center). Trulicity 1.5 mg weekly (using up this dose before starting 3mg). Patient is not experiencing side effects.   Blood sugar monitoring: Continuous Glucose Monitor. Ranges (patient reported, unable to download device today)  200s fasting, up to 300s at times after meals.   Symptoms of low blood sugar? none  Symptoms of high blood sugar? none  Eye exam: up to date  Foot exam: due  Diet: higher glucose when eating out or at drop in center, which is 3 days/week. Glucose typically lower when eating at group home and diet is controlled.Continues to have sugar in her coffee only at [facility] in center. She has an upcoming vacation in  "[place] for family reunion.   Exercise: Not discussed.  Aspirin: Taking 81mg daily.   Statin: Yes: Atorvastatin.   ACEi/ARB: Yes: Losartan.  Urine Albumin:   Lab Results   Component Value Date    UMALCR 9.40 07/27/2021      Lab Results   Component Value Date    A1C 7.3 03/24/2022    A1C 8.1 09/25/2021    A1C 10.0 08/10/2021    A1C 9.5 07/27/2021    A1C 9.1 12/01/2020    A1C 9.7 09/15/2020    A1C 8.6 06/30/2020    A1C 6.2 12/03/2019    A1C 6.6 08/06/2019         Today's Vitals: /76   Pulse 75   Temp 97  F (36.1  C)   Resp 18   Ht 5' 0.87\" (1.546 m)   Wt 243 lb (110.2 kg)   LMP 01/06/2015 (Exact Date)   SpO2 94%   BMI 46.12 kg/m    ----------------      I spent 30 minutes with this patient today. All changes were made via collaborative practice agreement with ART Baumann CNP. A copy of the visit note was provided to the patient's provider(s).    The patient was given a summary of these recommendations.     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

## 2022-07-20 NOTE — PATIENT INSTRUCTIONS
"Recommendations from today's MTM visit:                                                         Increase breakfast and lunch Novolog to 12 units.  Continue 8 units with dinner and use sliding scale.    2. Start calcium 1 pill once a day    3. Start alendronate 70mg once a week. Take on Wednesdays.  Take with a glass of water, remain sitting upright after taking for 30 minutes. Then you can take other medicines and eat.    Follow-up: 1 month    It was great speaking with you today.  I value your experience and would be very thankful for your time in providing feedback in our clinic survey. In the next few days, you may receive an email or text message from [facility]O with a link to a survey related to your  clinical pharmacist.\"     To schedule another MTM appointment, please call the clinic directly or you may call the MTM scheduling line at [phone] or toll-free at [phone].     My Clinical Pharmacist's contact information:                                                      Please feel free to contact me with any questions or concerns you have.      Eugenia De Jesus, PharmD, BCACP   Medication Management Pharmacist   [facility]  [phone]     "

## 2022-07-22 RX ORDER — INSULIN LISPRO 100 [IU]/ML
INJECTION, SOLUTION INTRAVENOUS; SUBCUTANEOUS
Qty: 15 ML | Refills: 1 | Status: SHIPPED | OUTPATIENT
Start: 2022-07-22 | End: 2022-07-26

## 2022-07-22 NOTE — TELEPHONE ENCOUNTER
LMOM for Amal her nurse to call back to check on how much medication she has left/if she can wait until Tuesday.    Kenzie MURPHY RN

## 2022-07-22 NOTE — TELEPHONE ENCOUNTER
I am covering for Eugenia, she will be out of office until 8/2/22.     I spoke with the pharmacy to clarify, Humalog KwikPen is the covered alternative that they are looking for.  I have set up this prescription and pended for signature.

## 2022-07-22 NOTE — TELEPHONE ENCOUNTER
FYI__I removed the humalog cartridge order as that was not correct, should be pens or vial ordered-usually pen is covered.     However, on my end it doesn't appear either of these are covered. Do they have enough until Eugenia could look into this on Tuesday? Or is this needed before then?     She saw Eugenia last, although pt due to follow up with me --would be good to do when I get back from vacation to check in if you can find an opening.     Thanks,   Chely CORDOVA CNP

## 2022-07-26 ENCOUNTER — MYC MEDICAL ADVICE (OUTPATIENT)
Dept: FAMILY MEDICINE | Facility: CLINIC | Age: 62
End: 2022-07-26

## 2022-07-26 ENCOUNTER — MYC MEDICAL ADVICE (OUTPATIENT)
Dept: PHARMACY | Facility: CLINIC | Age: 62
End: 2022-07-26

## 2022-07-26 DIAGNOSIS — E11.3299 TYPE 2 DIABETES MELLITUS WITH MILD NONPROLIFERATIVE RETINOPATHY WITHOUT MACULAR EDEMA, WITH LONG-TERM CURRENT USE OF INSULIN, UNSPECIFIED LATERALITY (H): ICD-10-CM

## 2022-07-26 DIAGNOSIS — Z79.4 TYPE 2 DIABETES MELLITUS WITH MILD NONPROLIFERATIVE RETINOPATHY WITHOUT MACULAR EDEMA, WITH LONG-TERM CURRENT USE OF INSULIN, UNSPECIFIED LATERALITY (H): ICD-10-CM

## 2022-07-26 RX ORDER — INSULIN LISPRO 100 [IU]/ML
INJECTION, SOLUTION INTRAVENOUS; SUBCUTANEOUS
Qty: 15 ML | Refills: 1 | Status: SHIPPED | OUTPATIENT
Start: 2022-07-26 | End: 2022-08-02

## 2022-07-26 NOTE — TELEPHONE ENCOUNTER
Edinson RN called back from the assisted living- blood sugar came down to 389 by 3am after pushing fluids. She reports patient is taking Lantus 28 units twice daily, Humalog 12 units with breakfast and lunch, 16 units generally at dinner with sliding scale (the sliding scale has a maximum of a total of 16 units with dinner, so patient is generally hitting the maximum each night). Edinson reports the lowest point reading from the Dexcom in the past two weeks was 166 mg/dL which was overnight- that she is regularly in the 300 to 400s throughout the day.    Edinson reports patient is snacking both through the day and evening so sugars are generally high all day and slightly lower overnight. However, patient and staff are not confident that the patient can do sliding scale during the day to help with this. They have tried repeatedly to encourage her to avoid snacking but have not had success curbing this recently.    As such, per CPA with Chely Aguilar CNP, will increase Lantus by 10 units per day to 33 units twice daily and allow evening Humalog sliding scale maximum to be stretched to max 20 units (will keep daytime doses the same for now). Orders sent to [facility] and faxed to Edinson at [phone].    Per Edinson, patient will be leaving for [place] on the 2nd, returning on the 8th. The staff is anticipating that this will not be good for her health due to the heat and potential food choices. I will schedule a phone visit with Eugenia Jorgeorah and Edinson for Aug 10th to give them a chance to touch base prior to the already-scheduled in-person covisit with Chely Aguilar CNP on the 17th.    Tanvi Gamble, KiD, BCACP  Medication Therapy Management Provider  Phone: [phone]  [email or web address]

## 2022-07-26 NOTE — TELEPHONE ENCOUNTER
Also left VM with Amal this morning at phone listed in the expressor software message.    Tanvi Gamble PharmD, BCACP  Medication Therapy Management Provider  Phone: [phone]  [email or web address]

## 2022-07-27 ENCOUNTER — THERAPY VISIT (OUTPATIENT)
Dept: PHYSICAL THERAPY | Facility: CLINIC | Age: 62
End: 2022-07-27
Payer: COMMERCIAL

## 2022-07-27 DIAGNOSIS — G89.4 CHRONIC PAIN SYNDROME: Primary | ICD-10-CM

## 2022-07-27 DIAGNOSIS — M79.7 FIBROMYALGIA: ICD-10-CM

## 2022-07-27 PROCEDURE — 97110 THERAPEUTIC EXERCISES: CPT | Mod: GP | Performed by: PHYSICAL THERAPIST

## 2022-08-02 ENCOUNTER — VIRTUAL VISIT (OUTPATIENT)
Dept: PHARMACY | Facility: CLINIC | Age: 62
End: 2022-08-02
Payer: COMMERCIAL

## 2022-08-02 DIAGNOSIS — F25.1 SCHIZOAFFECTIVE DISORDER, DEPRESSIVE TYPE (H): ICD-10-CM

## 2022-08-02 DIAGNOSIS — E11.3299 TYPE 2 DIABETES MELLITUS WITH MILD NONPROLIFERATIVE RETINOPATHY WITHOUT MACULAR EDEMA, WITH LONG-TERM CURRENT USE OF INSULIN, UNSPECIFIED LATERALITY (H): Primary | ICD-10-CM

## 2022-08-02 DIAGNOSIS — Z79.4 TYPE 2 DIABETES MELLITUS WITH MILD NONPROLIFERATIVE RETINOPATHY WITHOUT MACULAR EDEMA, WITH LONG-TERM CURRENT USE OF INSULIN, UNSPECIFIED LATERALITY (H): Primary | ICD-10-CM

## 2022-08-02 PROCEDURE — 99606 MTMS BY PHARM EST 15 MIN: CPT | Performed by: PHARMACIST

## 2022-08-02 RX ORDER — INSULIN LISPRO 100 [IU]/ML
INJECTION, SOLUTION INTRAVENOUS; SUBCUTANEOUS
Qty: 15 ML | Refills: 1 | Status: SHIPPED | OUTPATIENT
Start: 2022-08-02 | End: 2022-08-17

## 2022-08-02 NOTE — PATIENT INSTRUCTIONS
"Recommendations from today's MTM visit:                                                         Use sliding scale Humalog 3 times a day with meals as follows:    Blood sugar before meal Dose of Humalog*with meal*   Below 80 None    8 units   151-200 11 units   201-250 14 units   251-300 17 units   301-350 20 units   351 and higher 23 units       Follow-up: 2 weeks    It was great speaking with you today.  I value your experience and would be very thankful for your time in providing feedback in our clinic survey. In the next few days, you may receive an email or text message from [email or web address] with a link to a survey related to your  clinical pharmacist.\"     To schedule another MTM appointment, please call the clinic directly or you may call the MTM scheduling line at [phone] or toll-free at [phone].     My Clinical Pharmacist's contact information:                                                      Please feel free to contact me with any questions or concerns you have.      Eugenia De Jesus, PharmD, BCACP   Medication Management Pharmacist   [facility]  [phone]     "

## 2022-08-02 NOTE — PROGRESS NOTES
Called Edinson RN at [phone] for MTM visit; she was not with the patient at the group home and was planning to arrive later this afternoon since Nena is at the drop in center today.     Per Edinson, Nena has not been following through on recommendations by the staff to keep her glucose lower.  He has been eating sweets or fast food which has spiked glucose quite high into 400-500s.  She was taken to the ED on 7/26 for hyperglycemia.  When staff asks her to reduce food intake, exercise, increase water intake, patient has been declining.     Additionally Edinson notes that Nena is no longer trying to use her CPAP, stating she can't find the parts, but Edinson believes Nena may have hidden some of her equipment.    Nena is adamant about traveling to visit her family in [place] and is leaving tomorrow. Edinson has spoken to Nena's family about her health and is concerned about travel at this time with recent hyperglycemia. Her family seems receptive to helping.     Since patient is not available at the time of the visit, will attempt to reach Edinson JIN and Nena later this afternoon for a review of her insulin doses.    Eugenia De Jesus, PharmD, BCACP

## 2022-08-02 NOTE — PROGRESS NOTES
Medication Therapy Management (MTM) Encounter    ASSESSMENT:                            Medication Adherence/Access: No issues identified    Type 2 Diabetes: A1c at goal <8% but recent hyperglycemia. Patient with family support and RN are confident she can use sliding scale with all meals and would like to have available for hyperglycemia.   Est total insulin per day: 106 units.  Using rule of 1800, 1 unit rapid-acting insulin will lower glucose by 17mg/dL    Will use 3 units per 50    Mood: stable today, will defer further assessment to follow-up visit        PLAN:                            Use sliding scale Humalog 3 times a day with meals as follows:    Blood sugar before meal Dose of Humalog*with meal*   Below 80 None    8 units   151-200 11 units   201-250 14 units   251-300 17 units   301-350 20 units   351 and higher 23 units       Follow-up: 2 weeks    SUBJECTIVE/OBJECTIVE:                          Nena Tnag is a 62 year old female called for a follow-up visit. Patient was accompanied by Edinson RN for group home. Today's visit is a follow-up MTM visit from 7/20/22     Reason for visit: diabetes.    Allergies/ADRs: Reviewed in chart  Past Medical History: Reviewed in chart  Tobacco: She reports that she has never smoked. She has never used smokeless tobacco.  Alcohol: not currently using    Medication Adherence/Access: no issues reported  Group home fax:   [phone]    Type 2 Diabetes:  Currently taking Lantus 33 units twice daily, Humalog 12/12/8 units+ss adding 2 units for every 50 mg/dL over 140 at evening meal (last couple days, taking 16 units Humalog with dinner. She has been remembering to take her Humalog with her at drop-in center). Trulicity 1.5 mg weekly (using up this dose before starting 3mg, one more week remaining). Patient is not experiencing side effects.   Blood sugar monitoring: Continuous Glucose Monitor. Ranges (patient reported)    Date FBG/ 2hours post Lunch/2hours post  Dinner /2hours post    8/2 219 245     8/1 266 340 243/325     291 306 275     197 258 231     158 348 300/400,330                  7/26 highest 511    Symptoms of low blood sugar? none  Symptoms of high blood sugar? Tired, thirsty, blurry vision  Eye exam: up to date  Foot exam: due  Diet: higher glucose when eating out or at drop in center, which is 3 days/week. Glucose typically lower when eating at group home and diet is controlled. She has an upcoming vacation in [place] for family reunion.   Exercise: Not discussed.  Aspirin: Taking 81mg daily.   Statin: Yes: Atorvastatin.   ACEi/ARB: Yes: Losartan.  Urine Albumin:   Lab Results   Component Value Date    UMALCR 9.40 07/27/2021      Lab Results   Component Value Date    A1C 7.3 03/24/2022    A1C 8.1 09/25/2021    A1C 10.0 08/10/2021    A1C 9.5 07/27/2021    A1C 9.1 12/01/2020    A1C 9.7 09/15/2020    A1C 8.6 06/30/2020    A1C 6.2 12/03/2019    A1C 6.6 08/06/2019       Mood: did not review medications today but patient states she has been more depressed lately. Seems to be improving and mood is good today.  She is looking forward to visiting family, leaves tomorrow for a family reunion.      Today's Vitals: LMP 01/06/2015 (Exact Date)   ----------------      I spent 15 minutes with this patient today. All changes were made via collaborative practice agreement with ART Baumann CNP. A copy of the visit note was provided to the patient's provider(s).    The patient was sent via [employer] a summary of these recommendations.     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

## 2022-08-03 ENCOUNTER — MEDICAL CORRESPONDENCE (OUTPATIENT)
Dept: FAMILY MEDICINE | Facility: CLINIC | Age: 62
End: 2022-08-03

## 2022-08-03 DIAGNOSIS — E11.65 TYPE 2 DIABETES MELLITUS WITH HYPERGLYCEMIA, WITH LONG-TERM CURRENT USE OF INSULIN (H): Primary | ICD-10-CM

## 2022-08-03 DIAGNOSIS — E08.3292: ICD-10-CM

## 2022-08-03 DIAGNOSIS — Z79.4 TYPE 2 DIABETES MELLITUS WITH HYPERGLYCEMIA, WITH LONG-TERM CURRENT USE OF INSULIN (H): Primary | ICD-10-CM

## 2022-08-03 NOTE — TELEPHONE ENCOUNTER
Requested they also schedule hospital follow up once out of there or TCU    Eugenia, just [facility] in case you get a request to schedule for DM mgmt after discharge    Let's plan on pt not following carb diet and we can be pleasantly surprised if she does--I know we had our hopes up but consistency is better than unpredictability in my opinion.     Thanks,   Chely CORDOVA CNP

## 2022-08-03 NOTE — TELEPHONE ENCOUNTER
Replied, will follow up with me soonest avail, MTM first for med rec if needed. Thanks    Chely

## 2022-08-15 ENCOUNTER — MYC MEDICAL ADVICE (OUTPATIENT)
Dept: FAMILY MEDICINE | Facility: CLINIC | Age: 62
End: 2022-08-15

## 2022-08-17 ENCOUNTER — OFFICE VISIT (OUTPATIENT)
Dept: FAMILY MEDICINE | Facility: CLINIC | Age: 62
End: 2022-08-17
Payer: COMMERCIAL

## 2022-08-17 ENCOUNTER — OFFICE VISIT (OUTPATIENT)
Dept: PHARMACY | Facility: CLINIC | Age: 62
End: 2022-08-17
Payer: COMMERCIAL

## 2022-08-17 ENCOUNTER — LAB (OUTPATIENT)
Dept: LAB | Facility: CLINIC | Age: 62
End: 2022-08-17

## 2022-08-17 VITALS
OXYGEN SATURATION: 97 % | BODY MASS INDEX: 48.04 KG/M2 | WEIGHT: 246 LBS | DIASTOLIC BLOOD PRESSURE: 80 MMHG | SYSTOLIC BLOOD PRESSURE: 138 MMHG | TEMPERATURE: 98 F | HEART RATE: 74 BPM | RESPIRATION RATE: 14 BRPM

## 2022-08-17 DIAGNOSIS — E66.01 MORBID OBESITY (H): ICD-10-CM

## 2022-08-17 DIAGNOSIS — I10 ESSENTIAL HYPERTENSION WITH GOAL BLOOD PRESSURE LESS THAN 130/80: ICD-10-CM

## 2022-08-17 DIAGNOSIS — Z79.4 TYPE 2 DIABETES MELLITUS WITH MILD NONPROLIFERATIVE RETINOPATHY WITHOUT MACULAR EDEMA, WITH LONG-TERM CURRENT USE OF INSULIN, UNSPECIFIED LATERALITY (H): ICD-10-CM

## 2022-08-17 DIAGNOSIS — Z79.4 TYPE 2 DIABETES MELLITUS WITH MILD NONPROLIFERATIVE RETINOPATHY WITHOUT MACULAR EDEMA, WITH LONG-TERM CURRENT USE OF INSULIN, UNSPECIFIED LATERALITY (H): Primary | ICD-10-CM

## 2022-08-17 DIAGNOSIS — R53.83 FATIGUE, UNSPECIFIED TYPE: ICD-10-CM

## 2022-08-17 DIAGNOSIS — M79.10 MUSCLE ACHE: ICD-10-CM

## 2022-08-17 DIAGNOSIS — G89.29 CHRONIC RIGHT-SIDED LOW BACK PAIN WITHOUT SCIATICA: ICD-10-CM

## 2022-08-17 DIAGNOSIS — M54.50 CHRONIC RIGHT-SIDED LOW BACK PAIN WITHOUT SCIATICA: ICD-10-CM

## 2022-08-17 DIAGNOSIS — E11.3299 TYPE 2 DIABETES MELLITUS WITH MILD NONPROLIFERATIVE RETINOPATHY WITHOUT MACULAR EDEMA, WITH LONG-TERM CURRENT USE OF INSULIN, UNSPECIFIED LATERALITY (H): ICD-10-CM

## 2022-08-17 DIAGNOSIS — E78.5 HYPERLIPIDEMIA LDL GOAL <100: ICD-10-CM

## 2022-08-17 DIAGNOSIS — F25.0 SCHIZOAFFECTIVE DISORDER, BIPOLAR TYPE (H): ICD-10-CM

## 2022-08-17 DIAGNOSIS — R11.0 NAUSEA: ICD-10-CM

## 2022-08-17 DIAGNOSIS — E11.3299 TYPE 2 DIABETES MELLITUS WITH MILD NONPROLIFERATIVE RETINOPATHY WITHOUT MACULAR EDEMA, WITH LONG-TERM CURRENT USE OF INSULIN, UNSPECIFIED LATERALITY (H): Primary | ICD-10-CM

## 2022-08-17 DIAGNOSIS — F25.1 SCHIZOAFFECTIVE DISORDER, DEPRESSIVE TYPE (H): ICD-10-CM

## 2022-08-17 DIAGNOSIS — E55.9 VITAMIN D DEFICIENCY: ICD-10-CM

## 2022-08-17 DIAGNOSIS — N30.00 ACUTE CYSTITIS WITHOUT HEMATURIA: ICD-10-CM

## 2022-08-17 DIAGNOSIS — N10 ACUTE PYELONEPHRITIS: ICD-10-CM

## 2022-08-17 DIAGNOSIS — R60.0 LOCALIZED EDEMA: ICD-10-CM

## 2022-08-17 LAB
ALBUMIN SERPL-MCNC: 3.8 G/DL (ref 3.4–5)
ALP SERPL-CCNC: 98 U/L (ref 40–150)
ALT SERPL W P-5'-P-CCNC: 31 U/L (ref 0–50)
ANION GAP SERPL CALCULATED.3IONS-SCNC: 8 MMOL/L (ref 3–14)
AST SERPL W P-5'-P-CCNC: 14 U/L (ref 0–45)
BASOPHILS # BLD AUTO: 0 10E3/UL (ref 0–0.2)
BASOPHILS NFR BLD AUTO: 0 %
BILIRUB SERPL-MCNC: 0.2 MG/DL (ref 0.2–1.3)
BUN SERPL-MCNC: 21 MG/DL (ref 7–30)
CALCIUM SERPL-MCNC: 9.5 MG/DL (ref 8.5–10.1)
CHLORIDE BLD-SCNC: 111 MMOL/L (ref 94–109)
CHOLEST SERPL-MCNC: 165 MG/DL
CO2 SERPL-SCNC: 19 MMOL/L (ref 20–32)
CREAT SERPL-MCNC: 1.16 MG/DL (ref 0.52–1.04)
CREAT UR-MCNC: 108 MG/DL
EOSINOPHIL # BLD AUTO: 0.1 10E3/UL (ref 0–0.7)
EOSINOPHIL NFR BLD AUTO: 3 %
ERYTHROCYTE [DISTWIDTH] IN BLOOD BY AUTOMATED COUNT: 13.2 % (ref 10–15)
FASTING STATUS PATIENT QL REPORTED: YES
GFR SERPL CREATININE-BSD FRML MDRD: 53 ML/MIN/1.73M2
GLUCOSE BLD-MCNC: 148 MG/DL (ref 70–99)
HBA1C MFR BLD: 7.4 % (ref 0–5.6)
HCT VFR BLD AUTO: 36 % (ref 35–47)
HDLC SERPL-MCNC: 56 MG/DL
HGB BLD-MCNC: 11.5 G/DL (ref 11.7–15.7)
LDLC SERPL CALC-MCNC: 79 MG/DL
LYMPHOCYTES # BLD AUTO: 2.1 10E3/UL (ref 0.8–5.3)
LYMPHOCYTES NFR BLD AUTO: 42 %
MCH RBC QN AUTO: 31.3 PG (ref 26.5–33)
MCHC RBC AUTO-ENTMCNC: 31.9 G/DL (ref 31.5–36.5)
MCV RBC AUTO: 98 FL (ref 78–100)
MICROALBUMIN UR-MCNC: 11 MG/L
MICROALBUMIN/CREAT UR: 10.19 MG/G CR (ref 0–25)
MONOCYTES # BLD AUTO: 0.6 10E3/UL (ref 0–1.3)
MONOCYTES NFR BLD AUTO: 12 %
NEUTROPHILS # BLD AUTO: 2.1 10E3/UL (ref 1.6–8.3)
NEUTROPHILS NFR BLD AUTO: 43 %
NONHDLC SERPL-MCNC: 109 MG/DL
PLATELET # BLD AUTO: 201 10E3/UL (ref 150–450)
POTASSIUM BLD-SCNC: 4.7 MMOL/L (ref 3.4–5.3)
PROT SERPL-MCNC: 8.1 G/DL (ref 6.8–8.8)
RBC # BLD AUTO: 3.67 10E6/UL (ref 3.8–5.2)
SODIUM SERPL-SCNC: 138 MMOL/L (ref 133–144)
TRIGL SERPL-MCNC: 148 MG/DL
TSH SERPL DL<=0.005 MIU/L-ACNC: 2.69 MU/L (ref 0.4–4)
VIT B12 SERPL-MCNC: 284 PG/ML (ref 232–1245)
WBC # BLD AUTO: 5 10E3/UL (ref 4–11)

## 2022-08-17 PROCEDURE — 36415 COLL VENOUS BLD VENIPUNCTURE: CPT | Performed by: NURSE PRACTITIONER

## 2022-08-17 PROCEDURE — 99606 MTMS BY PHARM EST 15 MIN: CPT | Performed by: PHARMACIST

## 2022-08-17 PROCEDURE — 83036 HEMOGLOBIN GLYCOSYLATED A1C: CPT | Performed by: NURSE PRACTITIONER

## 2022-08-17 PROCEDURE — 99215 OFFICE O/P EST HI 40 MIN: CPT | Performed by: NURSE PRACTITIONER

## 2022-08-17 PROCEDURE — 82043 UR ALBUMIN QUANTITATIVE: CPT

## 2022-08-17 PROCEDURE — 80050 GENERAL HEALTH PANEL: CPT | Performed by: NURSE PRACTITIONER

## 2022-08-17 PROCEDURE — 80061 LIPID PANEL: CPT | Performed by: NURSE PRACTITIONER

## 2022-08-17 PROCEDURE — 82607 VITAMIN B-12: CPT | Performed by: NURSE PRACTITIONER

## 2022-08-17 PROCEDURE — 99607 MTMS BY PHARM ADDL 15 MIN: CPT | Performed by: PHARMACIST

## 2022-08-17 PROCEDURE — 82306 VITAMIN D 25 HYDROXY: CPT | Performed by: NURSE PRACTITIONER

## 2022-08-17 RX ORDER — CEPHALEXIN 500 MG/1
500 CAPSULE ORAL
COMMUNITY
Start: 2022-08-16 | End: 2022-08-23

## 2022-08-17 RX ORDER — ONDANSETRON 4 MG/1
4 TABLET, FILM COATED ORAL EVERY 8 HOURS PRN
Qty: 10 TABLET | Refills: 1 | Status: SHIPPED | OUTPATIENT
Start: 2022-08-17 | End: 2023-12-11

## 2022-08-17 RX ORDER — CEPHALEXIN 500 MG/1
CAPSULE ORAL
COMMUNITY
Start: 2022-08-16 | End: 2022-08-17

## 2022-08-17 RX ORDER — ISOPROPYL ALCOHOL 70 ML/100ML
1 SWAB TOPICAL 4 TIMES DAILY
Qty: 100 EACH | Refills: 4 | Status: SHIPPED | OUTPATIENT
Start: 2022-08-17 | End: 2022-10-25

## 2022-08-17 RX ORDER — INSULIN LISPRO 100 [IU]/ML
INJECTION, SOLUTION INTRAVENOUS; SUBCUTANEOUS
Qty: 15 ML | Refills: 1 | Status: SHIPPED | OUTPATIENT
Start: 2022-08-17 | End: 2022-11-18

## 2022-08-17 RX ORDER — CLONAZEPAM 0.5 MG/1
0.5 TABLET ORAL AT BEDTIME
Qty: 30 TABLET | Refills: 0 | Status: SHIPPED | OUTPATIENT
Start: 2022-08-17 | End: 2022-09-16

## 2022-08-17 ASSESSMENT — PATIENT HEALTH QUESTIONNAIRE - PHQ9
SUM OF ALL RESPONSES TO PHQ QUESTIONS 1-9: 10
10. IF YOU CHECKED OFF ANY PROBLEMS, HOW DIFFICULT HAVE THESE PROBLEMS MADE IT FOR YOU TO DO YOUR WORK, TAKE CARE OF THINGS AT HOME, OR GET ALONG WITH OTHER PEOPLE: NOT DIFFICULT AT ALL
SUM OF ALL RESPONSES TO PHQ QUESTIONS 1-9: 10

## 2022-08-17 ASSESSMENT — PAIN SCALES - GENERAL: PAINLEVEL: SEVERE PAIN (7)

## 2022-08-17 NOTE — TELEPHONE ENCOUNTER
Requested Prescriptions   Pending Prescriptions Disp Refills     Alcohol Swabs (EASY TOUCH ALCOHOL PREP MEDIUM) 70 % PADS 100 each 4     Sig: Apply 1 Units topically 4 times daily       There is no refill protocol information for this order        Kenzie MURPHY RN

## 2022-08-17 NOTE — PROGRESS NOTES
Assessment & Plan       ICD-10-CM    1. Type 2 diabetes mellitus with mild nonproliferative retinopathy without macular edema, with long-term current use of insulin, unspecified laterality (H)  E11.3299 Albumin Random Urine Quantitative with Creat Ratio    Z79.4 Lipid panel reflex to direct LDL Fasting     TSH with free T4 reflex     Hemoglobin A1c     Lipid panel reflex to direct LDL Fasting     TSH with free T4 reflex     Hemoglobin A1c   2. Hyperlipidemia LDL goal <100  E78.5 Lipid panel reflex to direct LDL Fasting     Lipid panel reflex to direct LDL Fasting   3. Chronic right-sided low back pain without sciatica  M54.50     G89.29    4. Schizoaffective disorder, depressive type (H)  F25.1    5. Essential hypertension with goal blood pressure less than 130/80  I10 TSH with free T4 reflex     Comprehensive metabolic panel     TSH with free T4 reflex     Comprehensive metabolic panel   6. Morbid obesity (H)  E66.01    7. Fatigue, unspecified type  R53.83 CBC with Platelets & Differential     CBC with Platelets & Differential     Vitamin B12     Vitamin B12   8. Acute pyelonephritis  N10    9. Vitamin D deficiency  E55.9 Vitamin D Deficiency     Vitamin D Deficiency   10. Localized edema  R60.0 Compression Sleeve/Stocking Order for DME - ONLY FOR DME   11. Nausea  R11.0 ondansetron (ZOFRAN) 4 MG tablet   12. Schizoaffective disorder, bipolar type (H)  F25.0 clonazePAM (KLONOPIN) 0.5 MG tablet   UC follow up visit, co-visit with Eugenia for DM follow up and polypharmacy     pt feeling fatigued and nauseated, generally ill after urinary tract infection, new foxamax and DM meds adjustments by Eugenia MTM recently  adding zofran added prn can do 4 and repeat for total of 8 if needed, and then rest of meds in 30 min  Tolerated 2nd dose of keflex today, UC pending from [facility]    Have discussed pt's noncompliance with diet and exercise,  staff doesn't need to notify us as much, discussed with pt telling us fake news  doesn't help her care and keep her sugars controlled, Eugenia adjusting timing of insulin to go along with her nightly ice cream bar pt enjoys, discussed there are other solutions and she is in charge of deciding QOL priorities    They will clarify whether she has sooner counseling set up, pt had appointment she apparently missed today see details below, denies SI  Nurse at her facility will look into this    Told them I'm leaving [facility] and she does want to see me for one more follow up visit. Can do on 9/9 and then on will request Dr Melendrez and Eugenia co-visits for the next 3 months after that, possible Dr Johns for next Primary Care Provider to establish with in Dec    Will CC note to him      60 minutes spent on the date of the encounter doing chart review, history and exam, documentation and further activities per the note       Work on weight loss  Regular exercise  See Patient Instructions    No follow-ups on file.    ART Baumann CNP  [facility]    Surjit Barrett is a 62 year old accompanied by her Facility Director, presenting for the following health issues:  RECHECK    Patient declined answering Ipad questions. Patient went to ED yesterday. Dx UTI and kidney infection per pt. Pt is on an antibiotic 4x/day for 7 days. Pt not feeling well today and visibly tired.         HPI   UC follow up   On keflex [facility] gave her, started one dose last night and this am she couldn't take it because fosamax    Back and leg pain complaints her her [facility] RN on phone today and aid with her in clinic  Doing PT as ordered  Acetaminophen and topicals do help    MH support needed - Psych appointment backed up far  Has appointment in Oct, then got phone call today she had a counseling appointment set up with Laurie Anna for 10 and Nena doesn't know where she is from,     [phone] today fasting   Eugenia MTM got rest of numbers, see note for details    Pt became nauseated and had water, crackers  and ice pack we got for her  Sleepy          Review of Systems   Constitutional, HEENT, cardiovascular, pulmonary, GI, , musculoskeletal, neuro, skin, endocrine and psych systems are negative, except as otherwise noted.      Objective    /80   Pulse 74   Temp 98  F (36.7  C) (Temporal)   Resp 14   Wt 111.6 kg (246 lb)   LMP 01/06/2015 (Exact Date)   SpO2 97%   BMI 48.04 kg/m    Body mass index is 48.04 kg/m .  Physical Exam   GENERAL: alert, no distress, obese and fatigued  EYES: Eyes grossly normal to inspection, PERRL and conjunctivae and sclerae normal  NECK: no adenopathy, no asymmetry, masses, or scars and thyroid normal to palpation  RESP: lungs clear to auscultation - no rales, rhonchi or wheezes and decreased breath sounds in bases  CV: regular rates and rhythm, normal S1 S2, no S3 or S4, no murmur, click or rub, peripheral pulses strong and 2+ bilateral lower extremity pitting edema to lower legs and feet    ABDOMEN: soft, nontender, no hepatosplenomegaly, no masses and bowel sounds normal limited due to obesity  MS: no gross musculoskeletal defects noted, no edema  NEURO: Normal strength and tone, sensory deficit lower legs and feet, abnormal mental status - cog deficits and tired a bit slurred speech at times otherwise alert and answers questions appropriately and gait normal-slow but steady  Comprehensive back pain exam:  Tenderness of paralumbar and parathoracic muscles, does not appear to have CVA tenderness  and Range of motion not limited by pain    Results for orders placed or performed in visit on 08/17/22 (from the past 24 hour(s))   CBC with Platelets & Differential    Narrative    The following orders were created for panel order CBC with Platelets & Differential.  Procedure                               Abnormality         Status                     ---------                               -----------         ------                     CBC with platelets and d...[[id number]]   Abnormal            Final result                 Please view results for these tests on the individual orders.   Hemoglobin A1c   Result Value Ref Range    Hemoglobin A1C 7.4 (H) 0.0 - 5.6 %   CBC with platelets and differential   Result Value Ref Range    WBC Count 5.0 4.0 - 11.0 10e3/uL    RBC Count 3.67 (L) 3.80 - 5.20 10e6/uL    Hemoglobin 11.5 (L) 11.7 - 15.7 g/dL    Hematocrit 36.0 35.0 - 47.0 %    MCV 98 78 - 100 fL    MCH 31.3 26.5 - 33.0 pg    MCHC 31.9 31.5 - 36.5 g/dL    RDW 13.2 10.0 - 15.0 %    Platelet Count 201 150 - 450 10e3/uL    % Neutrophils 43 %    % Lymphocytes 42 %    % Monocytes 12 %    % Eosinophils 3 %    % Basophils 0 %    Absolute Neutrophils 2.1 1.6 - 8.3 10e3/uL    Absolute Lymphocytes 2.1 0.8 - 5.3 10e3/uL    Absolute Monocytes 0.6 0.0 - 1.3 10e3/uL    Absolute Eosinophils 0.1 0.0 - 0.7 10e3/uL    Absolute Basophils 0.0 0.0 - 0.2 10e3/uL     *Note: Due to a large number of results and/or encounters for the requested time period, some results have not been displayed. A complete set of results can be found in Results Review.                   .  ..  Answers for HPI/ROS submitted by the patient on 8/17/2022  If you checked off any problems, how difficult have these problems made it for you to do your work, take care of things at home, or get along with other people?: Not difficult at all  PHQ9 TOTAL SCORE: 10

## 2022-08-18 ENCOUNTER — OFFICE VISIT (OUTPATIENT)
Dept: OPHTHALMOLOGY | Facility: CLINIC | Age: 62
End: 2022-08-18
Attending: OPHTHALMOLOGY
Payer: COMMERCIAL

## 2022-08-18 DIAGNOSIS — E11.65 TYPE 2 DIABETES MELLITUS WITH HYPERGLYCEMIA, WITH LONG-TERM CURRENT USE OF INSULIN (H): Primary | ICD-10-CM

## 2022-08-18 DIAGNOSIS — H26.493 PCO (POSTERIOR CAPSULAR OPACIFICATION), BILATERAL: ICD-10-CM

## 2022-08-18 DIAGNOSIS — Z79.4 TYPE 2 DIABETES MELLITUS WITH HYPERGLYCEMIA, WITH LONG-TERM CURRENT USE OF INSULIN (H): Primary | ICD-10-CM

## 2022-08-18 LAB — DEPRECATED CALCIDIOL+CALCIFEROL SERPL-MC: 27 UG/L (ref 20–75)

## 2022-08-18 PROCEDURE — 99214 OFFICE O/P EST MOD 30 MIN: CPT | Mod: 25 | Performed by: STUDENT IN AN ORGANIZED HEALTH CARE EDUCATION/TRAINING PROGRAM

## 2022-08-18 PROCEDURE — 66821 AFTER CATARACT LASER SURGERY: CPT | Mod: RT | Performed by: OPHTHALMOLOGY

## 2022-08-18 PROCEDURE — G0463 HOSPITAL OUTPT CLINIC VISIT: HCPCS | Mod: 25

## 2022-08-18 PROCEDURE — 66821 AFTER CATARACT LASER SURGERY: CPT | Mod: 50 | Performed by: STUDENT IN AN ORGANIZED HEALTH CARE EDUCATION/TRAINING PROGRAM

## 2022-08-18 PROCEDURE — 66821 AFTER CATARACT LASER SURGERY: CPT | Mod: 50 | Performed by: OPHTHALMOLOGY

## 2022-08-18 ASSESSMENT — SLIT LAMP EXAM - LIDS
COMMENTS: NORMAL
COMMENTS: NORMAL

## 2022-08-18 ASSESSMENT — CONF VISUAL FIELD
OD_NORMAL: 1
OS_NORMAL: 1
METHOD: COUNTING FINGERS

## 2022-08-18 ASSESSMENT — VISUAL ACUITY
OD_SC: 20/25
OS_PH_SC: 20/40
OD_SC+: +2
OS_SC+: +2
OS_SC: 20/60
METHOD: SNELLEN - LINEAR

## 2022-08-18 ASSESSMENT — CUP TO DISC RATIO
OD_RATIO: 0.3
OS_RATIO: 0.3

## 2022-08-18 ASSESSMENT — EXTERNAL EXAM - RIGHT EYE: OD_EXAM: NORMAL

## 2022-08-18 ASSESSMENT — TONOMETRY
IOP_METHOD: TONOPEN
OS_IOP_MMHG: 26
OD_IOP_MMHG: 22

## 2022-08-18 ASSESSMENT — PACHYMETRY
OD_CT(UM): 593
OS_CT(UM): 586

## 2022-08-18 ASSESSMENT — EXTERNAL EXAM - LEFT EYE: OS_EXAM: NORMAL

## 2022-08-18 NOTE — NURSING NOTE
"Chief Complaints and History of Present Illnesses   Patient presents with     Diabetic Retinopathy Follow Up     9 month follow up for severe NPDR with Diabetic macular edema each eye.   Patient reports vision is more blurry each eye in the last several months. \"It's more cloudy.\" Denies eye pain.      Chief Complaint(s) and History of Present Illness(es)     Diabetic Retinopathy Follow Up     Laterality: both eyes    Onset: months ago    Quality: blurred vision    Associated symptoms: glare (lights are too bright) and dryness (\"I guess so\").  Negative for haloes, eye pain, flashes and floaters    Pain scale: 0/10    Comments: 9 month follow up for severe NPDR with Diabetic macular edema each eye.   Patient reports vision is more blurry each eye in the last several months. \"It's more cloudy.\" Denies eye pain.               Comments     Ocular meds:   - AT PRN each eye, \"not using\"     DM2  Last BS: 168 today   Lab Results       Component                Value               Date                       A1C                      7.4                 08/17/2022                 A1C                      8.0                 06/21/2022                 A1C                      7.3                 03/24/2022                 A1C                      8.1                 09/25/2021                 A1C                      10.0                08/10/2021                 A1C                      9.1                 12/01/2020                 A1C                      9.7                 09/15/2020                 A1C                      8.6                 06/30/2020                 A1C                      6.2                 12/03/2019                 A1C                      6.6                 08/06/2019              YA Sage 3:03 PM 08/18/2022                   "

## 2022-08-18 NOTE — PROGRESS NOTES
I have confirmed the patient's CC, HPI and reviewed Past Medical History, Past Surgical History, Social History, Family History, Problem List, Medication List and agree with Tech note.     CC: Follow up Diabetes mellitus retinopathy     Interval history: VA seems blurrier both eyes. S/p focal laser OS with Dr. Payan at last visit 4/25/18. Marcy flashes or new floaters.     HPI: 59YO F Hx of DMII here for diabetic exam. DMII x 13 years. On oral and insulin. Last hgA1c 6.2% in 5/22/18. Glucose under more control. Hoping to get cataract surgery with Dr. Diaz now.     OCT Macula today  OD: No center involving intra-retinal fluid   OS: no me however poor image quality due to dry eye      Assessment/plan   1. Severe NPDR with Diabetic macular edema both eyes              - improved diabetes control, her last A1C is 7.4 in 8/2022 down from 10.1       2. Dry eye syndrome               Artificial tears over the counter               Monitor         3. Myopia OS with secondary amblyopia              Does not wear glasses normally      4.  Pseudophakia both eyes               - more dense PCO in both eyes               - yag capsulotomy both eyes today given mobility issues   - RTC 1 week V,T, manifest refraction and dilate    5.  Ocular HTN OU   - pachymetry ou          Return 1 week VT, manifest refraction, dilate    Tereso Lorenzo MD, MSc    ATTESTATION:  I have seen and examined the patient with Dr. Tereso Lorenzo MD MSc and agree with the findings in this note,as well as the interpretations of the diagnostic tests.  I was present for procedures.     Tiburcio Martin MD PhD.  Professor & Chair.       Tiburcio Martin MD PhD.  Professor & Chair.

## 2022-08-24 ENCOUNTER — THERAPY VISIT (OUTPATIENT)
Dept: PHYSICAL THERAPY | Facility: CLINIC | Age: 62
End: 2022-08-24
Payer: COMMERCIAL

## 2022-08-24 DIAGNOSIS — G89.4 CHRONIC PAIN SYNDROME: Primary | ICD-10-CM

## 2022-08-24 DIAGNOSIS — R30.0 DYSURIA: ICD-10-CM

## 2022-08-24 DIAGNOSIS — M79.7 FIBROMYALGIA: ICD-10-CM

## 2022-08-24 PROCEDURE — 97110 THERAPEUTIC EXERCISES: CPT | Mod: GP | Performed by: PHYSICAL THERAPIST

## 2022-08-25 ENCOUNTER — OFFICE VISIT (OUTPATIENT)
Dept: OPHTHALMOLOGY | Facility: CLINIC | Age: 62
End: 2022-08-25
Attending: OPHTHALMOLOGY
Payer: COMMERCIAL

## 2022-08-25 DIAGNOSIS — Z79.4 TYPE 2 DIABETES MELLITUS WITH MILD NONPROLIFERATIVE RETINOPATHY WITHOUT MACULAR EDEMA, WITH LONG-TERM CURRENT USE OF INSULIN, UNSPECIFIED LATERALITY (H): ICD-10-CM

## 2022-08-25 DIAGNOSIS — E11.3299 TYPE 2 DIABETES MELLITUS WITH MILD NONPROLIFERATIVE RETINOPATHY WITHOUT MACULAR EDEMA, WITH LONG-TERM CURRENT USE OF INSULIN, UNSPECIFIED LATERALITY (H): ICD-10-CM

## 2022-08-25 DIAGNOSIS — H26.493 PCO (POSTERIOR CAPSULAR OPACIFICATION), BILATERAL: Primary | ICD-10-CM

## 2022-08-25 PROCEDURE — 92015 DETERMINE REFRACTIVE STATE: CPT

## 2022-08-25 PROCEDURE — G0463 HOSPITAL OUTPT CLINIC VISIT: HCPCS | Mod: 25

## 2022-08-25 PROCEDURE — 99024 POSTOP FOLLOW-UP VISIT: CPT | Mod: GC | Performed by: OPHTHALMOLOGY

## 2022-08-25 RX ORDER — ASCORBIC ACID 500 MG
TABLET ORAL
Qty: 360 TABLET | Refills: 1 | Status: SHIPPED | OUTPATIENT
Start: 2022-08-25 | End: 2023-01-30

## 2022-08-25 ASSESSMENT — SLIT LAMP EXAM - LIDS
COMMENTS: NORMAL
COMMENTS: NORMAL

## 2022-08-25 ASSESSMENT — EXTERNAL EXAM - LEFT EYE: OS_EXAM: NORMAL

## 2022-08-25 ASSESSMENT — VISUAL ACUITY
OD_SC: 20/25
OS_PH_SC: 20/40
OS_SC: 20/50
OD_SC+: +1
METHOD: SNELLEN - LINEAR
OS_SC+: -2

## 2022-08-25 ASSESSMENT — EXTERNAL EXAM - RIGHT EYE: OD_EXAM: NORMAL

## 2022-08-25 ASSESSMENT — TONOMETRY
OD_IOP_MMHG: 22
OS_IOP_MMHG: 18
IOP_METHOD: TONOPEN
OS_IOP_MMHG: 28
OD_IOP_MMHG: 16
IOP_METHOD: APPLANATION

## 2022-08-25 ASSESSMENT — REFRACTION_MANIFEST
OD_ADD: +2.50
OS_SPHERE: -1.00
OD_SPHERE: +0.25
OD_CYLINDER: +0.50
OS_AXIS: 075
OD_AXIS: 075
OS_ADD: +2.50
OS_CYLINDER: +0.75

## 2022-08-25 ASSESSMENT — CUP TO DISC RATIO
OD_RATIO: 0.3
OS_RATIO: 0.3

## 2022-08-25 ASSESSMENT — CONF VISUAL FIELD
OS_NORMAL: 1
OD_NORMAL: 1

## 2022-08-25 NOTE — NURSING NOTE
Chief Complaints and History of Present Illnesses   Patient presents with     Post Op (Ophthalmology) Both Eyes     1 week post op yag caps BE  Blood sugar 174 this am last A1C 7 take 2 months ago  Vision has improved after last visit.  Kylah CEDENO 12:47 PM August 25, 2022          Chief Complaint(s) and History of Present Illness(es)     Post Op (Ophthalmology) Both Eyes     Laterality: both eyes    Associated symptoms: Negative for eye pain, pain with eye movement, flashes and floaters    Treatments tried: no treatments    Pain scale: 0/10    Comments: 1 week post op yag caps BE  Blood sugar 174 this am last A1C 7 take 2 months ago  Vision has improved after last visit.  Kylah CEDENO 12:47 PM August 25, 2022

## 2022-08-25 NOTE — PROGRESS NOTES
I have confirmed the patient's CC, HPI and reviewed Past Medical History, Past Surgical History, Social History, Family History, Problem List, Medication List and agree with Tech note.     CC: Follow up Diabetes mellitus retinopathy     Interval history: Vision slightly better. Happy with more light into both eyes.      HPI: 59YO F Hx of DMII here for diabetic exam. DMII x 13 years. On oral and insulin. Last hgA1c 6.2% in 5/22/18. Glucose under more control. Hoping to get cataract surgery with Dr. Diaz now.     OCT Macula today  OD: No center involving intra-retinal fluid   OS: no me however poor image quality due to dry eye      Assessment/plan   1. Severe NPDR with Diabetic macular edema both eyes              - improved diabetes control, her last A1C is 7.4 in 8/2022 down from 10.1    - RTC 4 months OCT mac and DFE   - Emphasized BG/BP control   - May need PRP    2. Dry eye syndrome               Artificial tears over the counter               Monitor         3. Myopia OS with secondary amblyopia              Does not wear glasses normally      4.  Pseudophakia both eyes    - s/p Yag cap 08/18/2022   - Improved light, slight improvement in VA   - IOP ok    5.  Ocular HTN OU   - pachymetry ou    - Recheck IOP applanation next time     RTC: 6 months DFE and OCT mac each eye for severe NPDR follow-up     Tiburcio Martin MD PhD.  Professor & Chair.

## 2022-08-25 NOTE — PATIENT INSTRUCTIONS
Please see us back in 6 months for your diabetic retinopathy    For your glasses   Consider [facility] in [place] [address], [place] [address] [phone]

## 2022-08-29 DIAGNOSIS — M54.50 CHRONIC RIGHT-SIDED LOW BACK PAIN WITHOUT SCIATICA: ICD-10-CM

## 2022-08-29 DIAGNOSIS — M79.7 FIBROMYALGIA: ICD-10-CM

## 2022-08-29 DIAGNOSIS — G89.4 CHRONIC PAIN SYNDROME: ICD-10-CM

## 2022-08-29 DIAGNOSIS — I25.119 CORONARY ARTERY DISEASE INVOLVING NATIVE HEART WITH ANGINA PECTORIS, UNSPECIFIED VESSEL OR LESION TYPE (H): ICD-10-CM

## 2022-08-29 DIAGNOSIS — F25.1 SCHIZOAFFECTIVE DISORDER, DEPRESSIVE TYPE (H): ICD-10-CM

## 2022-08-29 DIAGNOSIS — F51.04 PSYCHOPHYSIOLOGICAL INSOMNIA: ICD-10-CM

## 2022-08-29 DIAGNOSIS — G89.29 CHRONIC RIGHT-SIDED LOW BACK PAIN WITHOUT SCIATICA: ICD-10-CM

## 2022-08-29 NOTE — TELEPHONE ENCOUNTER
Dinorah calling to follow up on refill requests sent last week.     Was not received. Requesting ASAP as they are preparing pill packs for next month for patient

## 2022-08-30 RX ORDER — TOPIRAMATE 200 MG/1
200 TABLET, FILM COATED ORAL DAILY
Qty: 30 TABLET | Refills: 1 | Status: SHIPPED | OUTPATIENT
Start: 2022-08-30 | End: 2022-10-18

## 2022-08-30 RX ORDER — GABAPENTIN 300 MG/1
300 CAPSULE ORAL AT BEDTIME
Qty: 30 CAPSULE | Refills: 3 | Status: SHIPPED | OUTPATIENT
Start: 2022-08-30 | End: 2022-12-13

## 2022-08-30 RX ORDER — LOSARTAN POTASSIUM 100 MG/1
100 TABLET ORAL DAILY
Qty: 90 TABLET | Refills: 0 | Status: SHIPPED | OUTPATIENT
Start: 2022-08-30 | End: 2022-11-18

## 2022-08-30 RX ORDER — QUETIAPINE FUMARATE 25 MG/1
25 TABLET, FILM COATED ORAL AT BEDTIME
Qty: 30 TABLET | Refills: 1 | Status: SHIPPED | OUTPATIENT
Start: 2022-08-30 | End: 2022-10-19

## 2022-08-30 RX ORDER — HYDROXYZINE PAMOATE 25 MG/1
25 CAPSULE ORAL EVERY 4 HOURS PRN
Qty: 60 CAPSULE | Refills: 3 | Status: SHIPPED | OUTPATIENT
Start: 2022-08-30 | End: 2023-09-18

## 2022-08-30 RX ORDER — PALIPERIDONE 9 MG/1
9 TABLET, EXTENDED RELEASE ORAL AT BEDTIME
Qty: 28 TABLET | Refills: 3 | Status: CANCELLED | OUTPATIENT
Start: 2022-08-30

## 2022-08-30 NOTE — TELEPHONE ENCOUNTER
"Requested Prescriptions   Pending Prescriptions Disp Refills     paliperidone ER (INVEGA) 9 MG 24 hr tablet 28 tablet 3     Sig: Take 1 tablet (9 mg) by mouth At Bedtime       There is no refill protocol information for this order        topiramate (TOPAMAX) 200 MG tablet 30 tablet 3     Sig: Take 1 tablet (200 mg) by mouth daily       Anti-Seizure Meds Protocol  Failed - 8/29/2022  3:45 PM        Failed - Review Authorizing provider's last note.      Refer to last progress notes: confirm request is for original authorizing provider (cannot be through other providers).          Failed - Normal CBC on file in past 26 months     Recent Labs   Lab Test 08/17/22  1056   WBC 5.0   RBC 3.67*   HGB 11.5*   HCT 36.0                    Passed - Recent (12 mo) or future (30 days) visit within the authorizing provider's specialty     Patient has had an office visit with the authorizing provider or a provider within the authorizing providers department within the previous 12 mos or has a future within next 30 days. See \"Patient Info\" tab in inbasket, or \"Choose Columns\" in Meds & Orders section of the refill encounter.              Passed - Normal ALT or AST on file in past 26 months     Recent Labs   Lab Test 08/17/22  1056   ALT 31     Recent Labs   Lab Test 08/17/22  1056   AST 14             Passed - Normal platelet count on file in past 26 months     Recent Labs   Lab Test 08/17/22  1056                  Passed - Medication is active on med list        Passed - No active pregnancy on record        Passed - No positive pregnancy test in last 12 months           QUEtiapine (SEROQUEL) 25 MG tablet 30 tablet 3     Sig: Take 1 tablet (25 mg) by mouth At Bedtime       Antipsychotic Medications Passed - 8/29/2022  3:45 PM        Passed - Blood pressure under 140/90 in past 12 months     BP Readings from Last 3 Encounters:   08/17/22 138/80   07/20/22 130/76   06/21/22 128/79                 Passed - Patient is 12 " "years of age or older        Passed - Lipid panel on file within the past 12 months     Recent Labs   Lab Test 08/17/22  1056 07/13/16  1350 07/14/15  1215   CHOL 165   < > 147   TRIG 148   < > 151*   HDL 56   < > 39*   LDL 79   < > 78   NHDL 109   < >  --    VLDL  --   --  30   CHOLHDLRATIO  --   --  3.8    < > = values in this interval not displayed.               Passed - CBC on file in past 12 months     Recent Labs   Lab Test 08/17/22  1056   WBC 5.0   RBC 3.67*   HGB 11.5*   HCT 36.0                    Passed - Heart Rate on file within past 12 months     Pulse Readings from Last 3 Encounters:   08/17/22 74   07/20/22 75   06/21/22 76               Passed - A1c or Glucose on file in past 12 months     Recent Labs   Lab Test 08/17/22  1056 03/24/22  1211 02/25/22  1318   *   < >  --    A1C 7.4*   < >  --    75603  --   --  7.7*    < > = values in this interval not displayed.       Please review patients last 3 weights. If a weight gain of >10 lbs exists, you may refill the prescription once after instructing the patient to schedule an appointment within the next 30 days.    Wt Readings from Last 3 Encounters:   08/17/22 111.6 kg (246 lb)   07/20/22 110.2 kg (243 lb)   07/20/22 110.2 kg (243 lb)             Passed - Medication is active on med list        Passed - Patient is not pregnant        Passed - No positve pregnancy test on file in past 12 months        Passed - Recent (6 mo) or future (30 days) visit within the authorizing provider's specialty     Patient had office visit in the last 6 months or has a visit in the next 30 days with authorizing provider or within the authorizing provider's specialty.  See \"Patient Info\" tab in inbasket, or \"Choose Columns\" in Meds & Orders section of the refill encounter.               gabapentin (NEURONTIN) 300 MG capsule 30 capsule 3     Sig: Take 1 capsule (300 mg) by mouth At Bedtime       There is no refill protocol information for this order        " "hydrOXYzine (VISTARIL) 25 MG capsule 60 capsule 3     Sig: Take 1 capsule (25 mg) by mouth every 4 hours as needed for anxiety May take nightly for sleep       Antihistamines Protocol Passed - 8/29/2022  3:45 PM        Passed - Recent (12 mo) or future (30 days) visit within the authorizing provider's specialty     Patient has had an office visit with the authorizing provider or a provider within the authorizing providers department within the previous 12 mos or has a future within next 30 days. See \"Patient Info\" tab in inbasket, or \"Choose Columns\" in Meds & Orders section of the refill encounter.              Passed - Patient is age 3 or older     Apply age and/or weight-based dosing for peds patients age 3 and older.    Forward request to provider for patients under the age of 3.          Passed - Medication is active on med list           losartan (COZAAR) 100 MG tablet 28 tablet 3     Sig: Take 1 tablet (100 mg) by mouth daily       Angiotensin-II Receptors Failed - 8/29/2022  3:45 PM        Failed - Normal serum creatinine on file in past 12 months     Recent Labs   Lab Test 08/17/22  1056 05/16/19  0658 05/15/19  1834   CR 1.16*   < >  --    CREAT  --   --  0.9    < > = values in this interval not displayed.       Ok to refill medication if creatinine is low          Passed - Last blood pressure under 140/90 in past 12 months     BP Readings from Last 3 Encounters:   08/17/22 138/80   07/20/22 130/76   06/21/22 128/79                 Passed - Recent (12 mo) or future (30 days) visit within the authorizing provider's specialty     Patient has had an office visit with the authorizing provider or a provider within the authorizing providers department within the previous 12 mos or has a future within next 30 days. See \"Patient Info\" tab in inbasket, or \"Choose Columns\" in Meds & Orders section of the refill encounter.              Passed - Medication is active on med list        Passed - Patient is age 18 or " older        Passed - No active pregnancy on record        Passed - Normal serum potassium on file in past 12 months     Recent Labs   Lab Test 08/17/22  1056   POTASSIUM 4.7                    Passed - No positive pregnancy test in past 12 months           Has appt 9-9.  Kenzie MURPHY RN

## 2022-08-31 ENCOUNTER — THERAPY VISIT (OUTPATIENT)
Dept: PHYSICAL THERAPY | Facility: CLINIC | Age: 62
End: 2022-08-31
Payer: COMMERCIAL

## 2022-08-31 ENCOUNTER — TELEPHONE (OUTPATIENT)
Dept: FAMILY MEDICINE | Facility: CLINIC | Age: 62
End: 2022-08-31

## 2022-08-31 DIAGNOSIS — G89.4 CHRONIC PAIN SYNDROME: Primary | ICD-10-CM

## 2022-08-31 DIAGNOSIS — M79.7 FIBROMYALGIA: ICD-10-CM

## 2022-08-31 PROCEDURE — 97110 THERAPEUTIC EXERCISES: CPT | Mod: GP | Performed by: PHYSICAL THERAPIST

## 2022-09-09 ENCOUNTER — OFFICE VISIT (OUTPATIENT)
Dept: FAMILY MEDICINE | Facility: CLINIC | Age: 62
End: 2022-09-09
Payer: COMMERCIAL

## 2022-09-09 VITALS
TEMPERATURE: 97.2 F | BODY MASS INDEX: 47.01 KG/M2 | HEART RATE: 75 BPM | DIASTOLIC BLOOD PRESSURE: 77 MMHG | WEIGHT: 249 LBS | SYSTOLIC BLOOD PRESSURE: 132 MMHG | OXYGEN SATURATION: 97 % | HEIGHT: 61 IN

## 2022-09-09 DIAGNOSIS — E66.01 MORBID OBESITY (H): ICD-10-CM

## 2022-09-09 DIAGNOSIS — F25.1 SCHIZOAFFECTIVE DISORDER, DEPRESSIVE TYPE (H): ICD-10-CM

## 2022-09-09 DIAGNOSIS — M75.41 IMPINGEMENT SYNDROME OF SHOULDER REGION, RIGHT: Primary | ICD-10-CM

## 2022-09-09 DIAGNOSIS — M51.379 DDD (DEGENERATIVE DISC DISEASE), LUMBOSACRAL: ICD-10-CM

## 2022-09-09 PROCEDURE — 99213 OFFICE O/P EST LOW 20 MIN: CPT | Performed by: NURSE PRACTITIONER

## 2022-09-09 ASSESSMENT — PAIN SCALES - GENERAL: PAINLEVEL: SEVERE PAIN (7)

## 2022-09-09 NOTE — PROGRESS NOTES
Assessment & Plan       ICD-10-CM    1. Impingement syndrome of shoulder region, right  M75.41 Orthopedic  Referral   2. DDD (degenerative disc disease), lumbosacral  M51.37 Orthopedic  Referral   3. Schizoaffective disorder, depressive type (H)  F25.1    4. Morbid obesity (H)  E66.01       Discussed arthritis vs rotator cuff issues, will add PT to treat shoulder, consult also for injections in joints if candidate. Ongoing back pain also discussed briefly without any new symptoms.    I signed  orders for tylenol 1000 mg to be given three times a day if alble (middle dose prn if pt out and about). Instructed pt and staff member to ice often, voltaren often when pain. Heat okay once daily before stretching, continue working on lifesytle and wt loss    HTN at goal today, continues to work on getting DM controlled better, MTM knows her well    MH stable on current meds.     Parvin and Lonnie to bridge pt to next Primary Care Provider after I leave her, schedule follow up with joint visit in a month or earlier prn             Work on weight loss  Regular exercise    ART Baumann CNP  [facility]    Surjit Barrett is a 62 year old accompanied by her  staff worker, presenting for the following health issues:    RECHECK  Pain - Back Pain      History of Present Illness       Reason for visit:  Not feelwelllShe consumes 1 sweetened beverage(s) daily.She exercises with enough effort to increase her heart rate 9 or less minutes per day.  She exercises with enough effort to increase her heart rate 3 or less days per week.   She is taking medications regularly.       Chronic/Recurring Back Pain Follow Up      Where is your back pain located? (Select all that apply) low back right    How would you describe your back pain?  dull ache and shooting    Where does your back pain spread? nowhere    Since your last clinic visit for back pain, how has your pain changed?  "rapidly worsening    Does your back pain interfere with your job? YES - Not job, but [employer]    Since your last visit, have you tried any new treatment? Yes - Excersise    Anterior shoulder started hurting a couple weeks ago after sleeping on it funny, has hard time reaching over her head, feels stiff  Heating pad at home tried once, voltaren gel helped minimally  Did some walking at her activity center and was so tired had to sit before could walk to the bus, forgot to bring her walker with  Sore muscles after that, back and hip pain ongoing unchanged.   Had SI joint injection a little over a year ago and thinks it helped  Gabapentin at night helps  Tylenol at night, they don't think she's been taking different times of the day for the prn doses  Hasn't tried ice  Hydroxyzine for sleep, not clear if taking at night     New diagnosis osteoporosis, now taking calcium and fosamx  She feels like this is when her hip pain worsened a few months ago  Recently stopped NSAIDs due to CKD and worsening DM  Seeing Eugenia WEISS for adjustments    Imaging of right shoulder mild arthritis in 2019  MRI lumbar 11/2020  Impression: Minimal degenerative change at L5-S1 without spinal canal  stenosis or neural foraminal narrowing at any level.        Review of Systems   Constitutional, HEENT, cardiovascular, pulmonary, GI, , musculoskeletal, neuro, skin, endocrine and psych systems are negative, except as otherwise noted.      Objective    /77 (BP Location: Right arm, Patient Position: Sitting, Cuff Size: Adult Large)   Pulse 75   Temp 97.2  F (36.2  C) (Temporal)   Ht 1.55 m (5' 1.02\")   Wt 112.9 kg (249 lb)   LMP 01/06/2015 (Exact Date)   SpO2 97%   BMI 47.01 kg/m    Body mass index is 47.01 kg/m .  Physical Exam   GENERAL: alert, no distress and obese  NECK: no adenopathy, no asymmetry, masses, or scars and thyroid normal to palpation  RESP: lungs clear to auscultation - no rales, rhonchi or wheezes  CV: regular rate " and rhythm, normal S1 S2, no S3 or S4, no murmur, click or rub, no peripheral edema and peripheral pulses strong  MS: tenderness along anterior shoulder, but with continued exam pt is tender everywhere I touch her mostly over paraspinal musles, neck and shoulder, said she could no move fingers wrist or elbow with effort pt was able to do, limited rom of right shoulder but able to do all at least partially with minimal effort, walks without walker today slow but appears steady, tender over right SI joint, but again, diffusely tender with palpation today   SKIN: no suspicious lesions or rashes  NEURO: sensory exam grossly normal and mentation intact for her baseline, difficult to assess strength due to pain today   PSYCH: mentation appears normal, affect flat calm and cooperative overall    Office Visit on 08/17/2022   Component Date Value Ref Range Status     Cholesterol 08/17/2022 165  <200 mg/dL Final     Triglycerides 08/17/2022 148  <150 mg/dL Final     Direct Measure HDL 08/17/2022 56  >=50 mg/dL Final     LDL Cholesterol Calculated 08/17/2022 79  <=100 mg/dL Final     Non HDL Cholesterol 08/17/2022 109  <130 mg/dL Final     Patient Fasting > 8hrs? 08/17/2022 Yes   Final     TSH 08/17/2022 2.69  0.40 - 4.00 mU/L Final     Hemoglobin A1C 08/17/2022 7.4 (A) 0.0 - 5.6 % Final    Normal <5.7%   Prediabetes 5.7-6.4%    Diabetes 6.5% or higher     Note: Adopted from ADA consensus guidelines.     Sodium 08/17/2022 138  133 - 144 mmol/L Final     Potassium 08/17/2022 4.7  3.4 - 5.3 mmol/L Final     Chloride 08/17/2022 111 (A) 94 - 109 mmol/L Final     Carbon Dioxide (CO2) 08/17/2022 19 (A) 20 - 32 mmol/L Final     Anion Gap 08/17/2022 8  3 - 14 mmol/L Final     Urea Nitrogen 08/17/2022 21  7 - 30 mg/dL Final     Creatinine 08/17/2022 1.16 (A) 0.52 - 1.04 mg/dL Final     Calcium 08/17/2022 9.5  8.5 - 10.1 mg/dL Final     Glucose 08/17/2022 148 (A) 70 - 99 mg/dL Final     Alkaline Phosphatase 08/17/2022 98  40 - 150 U/L  Final     AST 08/17/2022 14  0 - 45 U/L Final     ALT 08/17/2022 31  0 - 50 U/L Final     Protein Total 08/17/2022 8.1  6.8 - 8.8 g/dL Final     Albumin 08/17/2022 3.8  3.4 - 5.0 g/dL Final     Bilirubin Total 08/17/2022 0.2  0.2 - 1.3 mg/dL Final     GFR Estimate 08/17/2022 53 (A) >60 mL/min/1.73m2 Final    Effective December 21, 2021 eGFRcr in adults is calculated using the 2021 CKD-EPI creatinine equation which includes age and gender (Ceferino et al., NE, DOI: 10.1056/MDDIyx0564940)     WBC Count 08/17/2022 5.0  4.0 - 11.0 10e3/uL Final     RBC Count 08/17/2022 3.67 (A) 3.80 - 5.20 10e6/uL Final     Hemoglobin 08/17/2022 11.5 (A) 11.7 - 15.7 g/dL Final     Hematocrit 08/17/2022 36.0  35.0 - 47.0 % Final     MCV 08/17/2022 98  78 - 100 fL Final     MCH 08/17/2022 31.3  26.5 - 33.0 pg Final     MCHC 08/17/2022 31.9  31.5 - 36.5 g/dL Final     RDW 08/17/2022 13.2  10.0 - 15.0 % Final     Platelet Count 08/17/2022 201  150 - 450 10e3/uL Final     % Neutrophils 08/17/2022 43  % Final     % Lymphocytes 08/17/2022 42  % Final     % Monocytes 08/17/2022 12  % Final     % Eosinophils 08/17/2022 3  % Final     % Basophils 08/17/2022 0  % Final     Absolute Neutrophils 08/17/2022 2.1  1.6 - 8.3 10e3/uL Final     Absolute Lymphocytes 08/17/2022 2.1  0.8 - 5.3 10e3/uL Final     Absolute Monocytes 08/17/2022 0.6  0.0 - 1.3 10e3/uL Final     Absolute Eosinophils 08/17/2022 0.1  0.0 - 0.7 10e3/uL Final     Absolute Basophils 08/17/2022 0.0  0.0 - 0.2 10e3/uL Final     Vitamin B12 08/17/2022 284  232 - 1,245 pg/mL Final     Vitamin D, Total (25-Hydroxy) 08/17/2022 27  20 - 75 ug/L Final

## 2022-09-14 ENCOUNTER — MYC REFILL (OUTPATIENT)
Dept: FAMILY MEDICINE | Facility: CLINIC | Age: 62
End: 2022-09-14

## 2022-09-14 ENCOUNTER — MYC MEDICAL ADVICE (OUTPATIENT)
Dept: PHARMACY | Facility: CLINIC | Age: 62
End: 2022-09-14

## 2022-09-14 ENCOUNTER — THERAPY VISIT (OUTPATIENT)
Dept: PHYSICAL THERAPY | Facility: CLINIC | Age: 62
End: 2022-09-14
Payer: COMMERCIAL

## 2022-09-14 DIAGNOSIS — M79.7 FIBROMYALGIA: ICD-10-CM

## 2022-09-14 DIAGNOSIS — F25.0 SCHIZOAFFECTIVE DISORDER, BIPOLAR TYPE (H): ICD-10-CM

## 2022-09-14 DIAGNOSIS — G89.4 CHRONIC PAIN SYNDROME: Primary | ICD-10-CM

## 2022-09-14 PROCEDURE — 97110 THERAPEUTIC EXERCISES: CPT | Mod: GP | Performed by: PHYSICAL THERAPIST

## 2022-09-14 NOTE — PROGRESS NOTES
[facility]    OUTPATIENT Physical Therapy ORTHOPEDIC EVALUATION  PLAN OF TREATMENT FOR OUTPATIENT REHABILITATION  (COMPLETE FOR INITIAL CLAIMS ONLY)  Patient's Last Name, First Name, M.I.  YOB: 1960  Nena Tang    Provider s Name:  [facility]   Medical Record No.  [id number]   Start of Care Date:  04/20/22   Onset Date:   03/24/22 (Order date)   Type:     _X__PT   ___OT Medical Diagnosis:    Encounter Diagnoses   Name Primary?    Chronic pain syndrome Yes    Fibromyalgia         Treatment Diagnosis:  Chronic pain / Fibromyalgia        Goals:     09/14/22 0500   Body Part   Goals listed below are for Chronic pain   Goal #1   Goal #1 ambulation   Current Functional Level Blocks patient can walk   Performance Level 3 before fatigue   STG Target Performance Blocks patient will be able to  walk;without assistive device   Performance Level 5 before fatigue   Rationale for safe household ambulation;for safe outdoor household ambulation;for safe community ambulation;to maintain proper body mechanics/posture while ambulating to avoid additional compensatory injury due to improper gait mechanics;to promote a healthy and active lifestyle   Due Date 10/29/22   If goal not met, Why? increased P symptoms, hard without walker    LTG Target Performance Blocks patient will be able to walk   Performance Level 7 before fatigue   Rationale for safe household ambulation;for safe outdoor household ambulation;for safe community ambulation;to maintain proper body mechanics/posture while ambulating to avoid additional compensatory injury due to improper gait mechanics;to promote a healthy and active lifestyle   Due Date 12/13/22   If goal not met, Why? difficult without walker, can perform without walker   Previous Functional Level Minutes patient could stand   Performance level 10    Current Functional Level Minutes patient can stand   Performance level 2, fatigue 8/10   STG Target Performance Minutes patient will be able to stand   Performance level 5, fatigue 4/10   Rationale for personal hygiene;for safe community ambulation  (to reduce risk of falls)   Due date 05/18/22   Date Goal Met 06/03/22   LTG Target Performance Minutes patient will be able to stand   Performance Level 10, fatigue 2/10   Rationale for safe community ambulation  (to reduce risk of falls)   Due date 06/14/22   Date Goal Met 06/03/22   If goal not met, Why? she can walk for 15 minutes       Therapy Frequency:  1x/week  Predicted Duration of Therapy Intervention:  x 12 weeks PRN per pain symptoms    JENNIFER ROSS, PT                 I CERTIFY THE NEED FOR THESE SERVICES FURNISHED UNDER        THIS PLAN OF TREATMENT AND WHILE UNDER MY CARE     (Physician attestation of this document indicates review and certification of the therapy plan).                     Certification Date From:  09/14/22   Certification Date To:  12/13/22    Referring Provider:  Rowena Haas    Initial Assessment        See Epic Evaluation SOC Date: 04/20/22

## 2022-09-15 RX ORDER — CLONAZEPAM 0.5 MG/1
0.5 TABLET ORAL AT BEDTIME
Qty: 30 TABLET | Refills: 3 | Status: CANCELLED | OUTPATIENT
Start: 2022-09-15

## 2022-09-15 NOTE — TELEPHONE ENCOUNTER
DAVIN Neves calling to follow up on refill request.   No discussion of discontinuing medication at last appointment.   Is on day 3 of not having any medication and RN is concerned patient will begin experiencing withdrawal.     Please call back to follow up

## 2022-09-15 NOTE — TELEPHONE ENCOUNTER
I would not recommend continuing this medication  She should use he vistaril and Seroquel as ordered

## 2022-09-16 RX ORDER — CLONAZEPAM 0.5 MG/1
0.5 TABLET ORAL
Qty: 30 TABLET | Refills: 0 | Status: SHIPPED | OUTPATIENT
Start: 2022-09-16 | End: 2022-10-18

## 2022-09-16 NOTE — TELEPHONE ENCOUNTER
I will reorder this for pt since we did not ever discuss stopping this and it has been helpful for sleep in her GH.     I'll leave it up to new Primary Care Provider whether to continue or wean off. Could try giving every other night/ only when needed and see how it goes with Seroquel and hydroxyzine.     I'm leaving [facility] so this will be the last 30 day Rx from me. Needs an appointment sooner with Dr Rainey to establish care by mid Oct please, can be joint with Eugenia WEISS if that works out--Steve could you see her when Edwar does next?    Thanks,   Chely CORDOVA CNP

## 2022-09-16 NOTE — TELEPHONE ENCOUNTER
Thanks Eugenia, I did refill this request that came in my inbasket, so if any confusion, I just saw your message here now.     Thanks for reaching out to them, I let Parvin know the plan and if you want to do a co-visit you could see if Nena can set that up next.     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    Keep up the good work :)    Chely

## 2022-09-20 DIAGNOSIS — U07.1 2019 NOVEL CORONAVIRUS DISEASE (COVID-19): ICD-10-CM

## 2022-09-20 RX ORDER — ACETAMINOPHEN 325 MG/1
TABLET ORAL
Qty: 200 TABLET | Refills: 3 | Status: SHIPPED | OUTPATIENT
Start: 2022-09-20 | End: 2022-09-27 | Stop reason: DRUGHIGH

## 2022-09-20 NOTE — TELEPHONE ENCOUNTER
Prescription approved per [facility] Refill Protocol.  Passes protocol    Cherri Xiong RN  [facility]

## 2022-09-24 ENCOUNTER — MYC MEDICAL ADVICE (OUTPATIENT)
Dept: PHARMACY | Facility: CLINIC | Age: 62
End: 2022-09-24

## 2022-09-26 DIAGNOSIS — F25.1 SCHIZOAFFECTIVE DISORDER, DEPRESSIVE TYPE (H): ICD-10-CM

## 2022-09-26 RX ORDER — PALIPERIDONE 9 MG/1
9 TABLET, EXTENDED RELEASE ORAL AT BEDTIME
Qty: 28 TABLET | Refills: 3 | Status: SHIPPED | OUTPATIENT
Start: 2022-09-26 | End: 2023-01-10

## 2022-09-26 NOTE — TELEPHONE ENCOUNTER
Patient requesting clonazepam as daily medication, see from refill order provider is leaving and requesting patient establish with new provider on if to continue medication.  Seeing Eugenia De Jesus tomorrow for an appointment, will delay message for her to address this then

## 2022-09-27 ENCOUNTER — APPOINTMENT (OUTPATIENT)
Dept: CT IMAGING | Facility: CLINIC | Age: 62
End: 2022-09-27
Attending: EMERGENCY MEDICINE
Payer: COMMERCIAL

## 2022-09-27 ENCOUNTER — HOSPITAL ENCOUNTER (EMERGENCY)
Facility: CLINIC | Age: 62
Discharge: HOME | End: 2022-09-28
Attending: EMERGENCY MEDICINE | Admitting: EMERGENCY MEDICINE
Payer: COMMERCIAL

## 2022-09-27 ENCOUNTER — ALLIED HEALTH/NURSE VISIT (OUTPATIENT)
Dept: FAMILY MEDICINE | Facility: CLINIC | Age: 62
End: 2022-09-27
Payer: COMMERCIAL

## 2022-09-27 ENCOUNTER — OFFICE VISIT (OUTPATIENT)
Dept: PHARMACY | Facility: CLINIC | Age: 62
End: 2022-09-27
Payer: COMMERCIAL

## 2022-09-27 VITALS — DIASTOLIC BLOOD PRESSURE: 78 MMHG | SYSTOLIC BLOOD PRESSURE: 132 MMHG

## 2022-09-27 DIAGNOSIS — Z79.4 TYPE 2 DIABETES MELLITUS WITH MILD NONPROLIFERATIVE RETINOPATHY WITHOUT MACULAR EDEMA, WITH LONG-TERM CURRENT USE OF INSULIN, UNSPECIFIED LATERALITY (H): ICD-10-CM

## 2022-09-27 DIAGNOSIS — F25.1 SCHIZOAFFECTIVE DISORDER, DEPRESSIVE TYPE (H): ICD-10-CM

## 2022-09-27 DIAGNOSIS — Z23 NEED FOR VACCINATION: Primary | ICD-10-CM

## 2022-09-27 DIAGNOSIS — Z87.440 PERSONAL HISTORY OF URINARY TRACT INFECTION: ICD-10-CM

## 2022-09-27 DIAGNOSIS — G89.29 CHRONIC PAIN OF BOTH SHOULDERS: Primary | ICD-10-CM

## 2022-09-27 DIAGNOSIS — Z71.85 VACCINE COUNSELING: ICD-10-CM

## 2022-09-27 DIAGNOSIS — R10.9 FLANK PAIN: ICD-10-CM

## 2022-09-27 DIAGNOSIS — M25.511 CHRONIC PAIN OF BOTH SHOULDERS: Primary | ICD-10-CM

## 2022-09-27 DIAGNOSIS — M25.512 CHRONIC PAIN OF BOTH SHOULDERS: Primary | ICD-10-CM

## 2022-09-27 DIAGNOSIS — E11.3299 TYPE 2 DIABETES MELLITUS WITH MILD NONPROLIFERATIVE RETINOPATHY WITHOUT MACULAR EDEMA, WITH LONG-TERM CURRENT USE OF INSULIN, UNSPECIFIED LATERALITY (H): ICD-10-CM

## 2022-09-27 LAB
ALBUMIN SERPL-MCNC: 4.1 G/DL (ref 3.4–5)
ALBUMIN UR-MCNC: NEGATIVE MG/DL
ALP SERPL-CCNC: 93 U/L (ref 40–150)
ALT SERPL W P-5'-P-CCNC: 28 U/L (ref 0–50)
ANION GAP SERPL CALCULATED.3IONS-SCNC: 7 MMOL/L (ref 3–14)
APPEARANCE UR: CLEAR
AST SERPL W P-5'-P-CCNC: 21 U/L (ref 0–45)
BACTERIA #/AREA URNS HPF: ABNORMAL /HPF
BASOPHILS # BLD AUTO: 0 10E3/UL (ref 0–0.2)
BASOPHILS NFR BLD AUTO: 1 %
BILIRUB SERPL-MCNC: 0.3 MG/DL (ref 0.2–1.3)
BILIRUB UR QL STRIP: NEGATIVE
BUN SERPL-MCNC: 23 MG/DL (ref 7–30)
CALCIUM SERPL-MCNC: 9.4 MG/DL (ref 8.5–10.1)
CHLORIDE BLD-SCNC: 110 MMOL/L (ref 94–109)
CO2 SERPL-SCNC: 23 MMOL/L (ref 20–32)
COLOR UR AUTO: ABNORMAL
CREAT SERPL-MCNC: 1.14 MG/DL (ref 0.52–1.04)
EOSINOPHIL # BLD AUTO: 0.1 10E3/UL (ref 0–0.7)
EOSINOPHIL NFR BLD AUTO: 2 %
ERYTHROCYTE [DISTWIDTH] IN BLOOD BY AUTOMATED COUNT: 12.6 % (ref 10–15)
GFR SERPL CREATININE-BSD FRML MDRD: 55 ML/MIN/1.73M2
GLUCOSE BLD-MCNC: 110 MG/DL (ref 70–99)
GLUCOSE UR STRIP-MCNC: NEGATIVE MG/DL
HCT VFR BLD AUTO: 35.5 % (ref 35–47)
HGB BLD-MCNC: 11.7 G/DL (ref 11.7–15.7)
HGB UR QL STRIP: NEGATIVE
IMM GRANULOCYTES # BLD: 0 10E3/UL
IMM GRANULOCYTES NFR BLD: 0 %
INR PPP: 0.88 (ref 0.85–1.15)
KETONES UR STRIP-MCNC: NEGATIVE MG/DL
LEUKOCYTE ESTERASE UR QL STRIP: ABNORMAL
LYMPHOCYTES # BLD AUTO: 2.9 10E3/UL (ref 0.8–5.3)
LYMPHOCYTES NFR BLD AUTO: 42 %
MCH RBC QN AUTO: 31.6 PG (ref 26.5–33)
MCHC RBC AUTO-ENTMCNC: 33 G/DL (ref 31.5–36.5)
MCV RBC AUTO: 96 FL (ref 78–100)
MONOCYTES # BLD AUTO: 0.7 10E3/UL (ref 0–1.3)
MONOCYTES NFR BLD AUTO: 11 %
MUCOUS THREADS #/AREA URNS LPF: PRESENT /LPF
NEUTROPHILS # BLD AUTO: 3 10E3/UL (ref 1.6–8.3)
NEUTROPHILS NFR BLD AUTO: 44 %
NITRATE UR QL: NEGATIVE
NRBC # BLD AUTO: 0 10E3/UL
NRBC BLD AUTO-RTO: 0 /100
PH UR STRIP: 6.5 [PH] (ref 5–7)
PLATELET # BLD AUTO: 209 10E3/UL (ref 150–450)
POTASSIUM BLD-SCNC: 4.7 MMOL/L (ref 3.4–5.3)
PROT SERPL-MCNC: 8.5 G/DL (ref 6.8–8.8)
RBC # BLD AUTO: 3.7 10E6/UL (ref 3.8–5.2)
RBC URINE: <1 /HPF
SODIUM SERPL-SCNC: 140 MMOL/L (ref 133–144)
SP GR UR STRIP: 1.01 (ref 1–1.03)
SQUAMOUS EPITHELIAL: 1 /HPF
UROBILINOGEN UR STRIP-MCNC: NORMAL MG/DL
WBC # BLD AUTO: 6.8 10E3/UL (ref 4–11)
WBC URINE: 1 /HPF

## 2022-09-27 PROCEDURE — G0008 ADMIN INFLUENZA VIRUS VAC: HCPCS | Mod: 59

## 2022-09-27 PROCEDURE — 36415 COLL VENOUS BLD VENIPUNCTURE: CPT | Performed by: EMERGENCY MEDICINE

## 2022-09-27 PROCEDURE — 90682 RIV4 VACC RECOMBINANT DNA IM: CPT

## 2022-09-27 PROCEDURE — 99207 PR NO CHARGE NURSE ONLY: CPT

## 2022-09-27 PROCEDURE — 81001 URINALYSIS AUTO W/SCOPE: CPT | Performed by: EMERGENCY MEDICINE

## 2022-09-27 PROCEDURE — 99606 MTMS BY PHARM EST 15 MIN: CPT | Performed by: PHARMACIST

## 2022-09-27 PROCEDURE — 250N000011 HC RX IP 250 OP 636: Performed by: EMERGENCY MEDICINE

## 2022-09-27 PROCEDURE — 80053 COMPREHEN METABOLIC PANEL: CPT | Performed by: EMERGENCY MEDICINE

## 2022-09-27 PROCEDURE — 96374 THER/PROPH/DIAG INJ IV PUSH: CPT | Performed by: EMERGENCY MEDICINE

## 2022-09-27 PROCEDURE — 99284 EMERGENCY DEPT VISIT MOD MDM: CPT | Performed by: EMERGENCY MEDICINE

## 2022-09-27 PROCEDURE — 91313 COVID-19,PF,MODERNA BIVALENT: CPT

## 2022-09-27 PROCEDURE — 258N000003 HC RX IP 258 OP 636

## 2022-09-27 PROCEDURE — 0134A COVID-19,PF,MODERNA BIVALENT: CPT

## 2022-09-27 PROCEDURE — 96361 HYDRATE IV INFUSION ADD-ON: CPT | Performed by: EMERGENCY MEDICINE

## 2022-09-27 PROCEDURE — 85610 PROTHROMBIN TIME: CPT | Performed by: EMERGENCY MEDICINE

## 2022-09-27 PROCEDURE — U0005 INFEC AGEN DETEC AMPLI PROBE: HCPCS | Performed by: EMERGENCY MEDICINE

## 2022-09-27 PROCEDURE — C9803 HOPD COVID-19 SPEC COLLECT: HCPCS | Performed by: EMERGENCY MEDICINE

## 2022-09-27 PROCEDURE — 96375 TX/PRO/DX INJ NEW DRUG ADDON: CPT | Performed by: EMERGENCY MEDICINE

## 2022-09-27 PROCEDURE — 85025 COMPLETE CBC W/AUTO DIFF WBC: CPT | Performed by: EMERGENCY MEDICINE

## 2022-09-27 PROCEDURE — 74176 CT ABD & PELVIS W/O CONTRAST: CPT

## 2022-09-27 PROCEDURE — 99285 EMERGENCY DEPT VISIT HI MDM: CPT | Mod: 25 | Performed by: EMERGENCY MEDICINE

## 2022-09-27 PROCEDURE — 99607 MTMS BY PHARM ADDL 15 MIN: CPT | Performed by: PHARMACIST

## 2022-09-27 RX ORDER — ONDANSETRON 2 MG/ML
4 INJECTION INTRAMUSCULAR; INTRAVENOUS EVERY 30 MIN PRN
Status: DISCONTINUED | OUTPATIENT
Start: 2022-09-27 | End: 2022-09-28 | Stop reason: HOSPADM

## 2022-09-27 RX ORDER — ACETAMINOPHEN 500 MG
TABLET ORAL
Qty: 200 TABLET | Refills: 11 | Status: SHIPPED | OUTPATIENT
Start: 2022-09-27 | End: 2023-10-03

## 2022-09-27 RX ORDER — SODIUM CHLORIDE 9 MG/ML
INJECTION, SOLUTION INTRAVENOUS CONTINUOUS
Status: DISCONTINUED | OUTPATIENT
Start: 2022-09-27 | End: 2022-09-28 | Stop reason: HOSPADM

## 2022-09-27 RX ORDER — SODIUM CHLORIDE 9 MG/ML
INJECTION, SOLUTION INTRAVENOUS
Status: COMPLETED
Start: 2022-09-27 | End: 2022-09-27

## 2022-09-27 RX ORDER — HYDROMORPHONE HYDROCHLORIDE 1 MG/ML
0.5 INJECTION, SOLUTION INTRAMUSCULAR; INTRAVENOUS; SUBCUTANEOUS
Status: DISCONTINUED | OUTPATIENT
Start: 2022-09-27 | End: 2022-09-28 | Stop reason: HOSPADM

## 2022-09-27 RX ADMIN — HYDROMORPHONE HYDROCHLORIDE 0.5 MG: 1 INJECTION, SOLUTION INTRAMUSCULAR; INTRAVENOUS; SUBCUTANEOUS at 22:37

## 2022-09-27 RX ADMIN — ONDANSETRON 4 MG: 2 INJECTION INTRAMUSCULAR; INTRAVENOUS at 22:37

## 2022-09-27 RX ADMIN — SODIUM CHLORIDE 500 ML: 9 INJECTION, SOLUTION INTRAVENOUS at 22:36

## 2022-09-27 RX ADMIN — Medication 500 ML: at 22:36

## 2022-09-27 ASSESSMENT — ENCOUNTER SYMPTOMS
ALLERGIC/IMMUNOLOGIC NEGATIVE: 1
RESPIRATORY NEGATIVE: 1
ENDOCRINE NEGATIVE: 1
DIARRHEA: 0
EYES NEGATIVE: 1
CONSTITUTIONAL NEGATIVE: 1
PSYCHIATRIC NEGATIVE: 1
ABDOMINAL PAIN: 1
MUSCULOSKELETAL NEGATIVE: 1
CARDIOVASCULAR NEGATIVE: 1
HEMATOLOGIC/LYMPHATIC NEGATIVE: 1
NEUROLOGICAL NEGATIVE: 1
VOMITING: 0

## 2022-09-27 ASSESSMENT — ACTIVITIES OF DAILY LIVING (ADL): ADLS_ACUITY_SCORE: 37

## 2022-09-27 NOTE — PROGRESS NOTES
Medication Therapy Management (MTM) Encounter    ASSESSMENT:                            Medication Adherence/Access: No issues identified    Chronic pain: continue physical therapy, will work with covering providers to update acetaminophen instructions    Schizoaffective disorder: stable. Encouraged establishing with psychiatry. Continues to have polypharmacy particularly with sedative medications (quetiapine, trazodone, hydroxyzine, clonazepam), however without psychiatry or PCP support, reluctant to make changes to her regimen at this time. Will defer additional discussion to a later date after she is able to establish care with new providers.    Hx UTI: discussed symptoms with covering provider and determined she needs further exam. Unfortunately no provider openings in clinic today.  Recommended she to go to urgent care    Type 2 Diabetes: A1c at goal <8%. Reviewed CGM data and encouraged continuing to work on reducing her sugar intake at drop in [place]. Glucose average higher this past week but previously fasting in low 100s - will hold off on further changes in insulin doses and monitor.     Vaccine: patient agreeable to vaccine update today    PLAN:                            1. Increase acetaminophen to 1000mg twice daily AM and PM with 500-1000mg dose midday as needed for pain - OK per covering provider  2. Continue working on reducing sugar intake  3. Flu and Covid booster today  4. Recommended patient have further workup on flank pain at urgent care  5. Schedule psychiatry appointment to establish care, referral phone number provided.    Follow-up: 1 month    SUBJECTIVE/OBJECTIVE:                          Nena Tang is a 62 year old female coming in for a follow-up visit. Patient was accompanied by group home staff. Today's visit is a follow-up MTM visit from 8/17/2022     Reason for visit: medication recheck.    Allergies/ADRs: Reviewed in chart  Past Medical History: Reviewed in chart  Tobacco: She  reports that she has never smoked. She has never used smokeless tobacco.  Alcohol: none    Medication Adherence/Access: no issues reported    Chronic pain: started physical therapy, working on improving strength in legs and shoulders which has been helpful. Would like to increase dose of acetaminophen as previously recommended by PCP but needs updated orders with pharmacy.     Schizoaffective disorder: currently taking Invega 9mg at bedtime, quetiapine 25mg at bedtime, escitalopram 10mg daily, clonazepam 0.5mg at bedtime, trazodone 100mg at bedtime.   Mood is good, no concerns.   She does continue to see image of a man while falling asleep, however with clonazepam she notices she is able to ignore this and fall asleep.  Group home staff prefers to continue having clonazepam prescribed as scheduled instead of PRN.   Drop in center 3 times a week, participating in group exercise.   Therapist - none  Psychiatry - none. Has [facility] near home. Did not schedule per mental health referral placed in June    Hx UTI: about 1 week ago started having right side abdominal pain that wraps around to her back. Urination change - smaller amount of urine at a time. No burning with urination. No fever.     Type 2 Diabetes:  Currently taking Lantus 33 units twice daily, Humalog 8 units with meals +sliding scale 3u per 50mg/dL starting glucose 151 and max 23u. She Is doing well with her sliding scale. Trulicity 3 mg weekly. Patient is experiencing possible side effects: nausea.   Blood sugar monitoring: Continuous Glucose Monitor. Ranges (patient reported)          Symptoms of low blood sugar? none  Symptoms of high blood sugar? none  Eye exam: up to date  Foot exam: up to date  Diet: 3 tsp sugar in her coffee at drop in center.   Exercise: increased with physical therapy and drop in center activities  Aspirin: Taking 81mg daily.   Statin: Yes: Atorvastatin.   ACEi/ARB: Yes: Losartan.  Urine Albumin:   Lab Results   Component Value Date     UMALCR 9.40 07/27/2021      Lab Results   Component Value Date    A1C 7.4 08/17/2022    A1C 8.0 06/21/2022    A1C 7.3 03/24/2022    A1C 8.1 09/25/2021    A1C 10.0 08/10/2021    A1C 9.1 12/01/2020    A1C 9.7 09/15/2020    A1C 8.6 06/30/2020    A1C 6.2 12/03/2019    A1C 6.6 08/06/2019     Vaccine: due for influenza vaccine and Covid booster    Today's Vitals: LMP 01/06/2015 (Exact Date)   ----------------      I spent 40 minutes with this patient today.  A copy of the visit note was provided to the patient's provider(s).    The patient was sent via [employer] a summary of these recommendations.     Eugenia De Jesus, PharmD, BCACP        Medication Therapy Recommendations  Chronic pain syndrome    Current Medication: acetaminophen (TYLENOL) 325 MG tablet (Discontinued)   Rationale: Dose too low - Dosage too low - Effectiveness   Recommendation: Increase Dose - acetaminophen 500 MG tablet   Status: Accepted per Provider         Vaccine counseling    Rationale: Preventive therapy - Needs additional medication therapy - Indication   Recommendation: Start Medication - INFLUENZA VAC RECOM HA QUAD PF IM   Status: Accepted per CPA

## 2022-09-27 NOTE — PROGRESS NOTES
Prior to immunization administration, verified patients identity using patient s name and date of birth. Please see Immunization Activity for additional information.     Screening Questionnaire for Adult Immunization    Are you sick today?   No   Do you have allergies to medications, food, a vaccine component or latex?   No   Have you ever had a serious reaction after receiving a vaccination?   No   Do you have a long-term health problem with heart, lung, kidney, or metabolic disease (e.g., diabetes), asthma, a blood disorder, no spleen, complement component deficiency, a cochlear implant, or a spinal fluid leak?  Are you on long-term aspirin therapy?   No   Do you have cancer, leukemia, HIV/AIDS, or any other immune system problem?   No   Do you have a parent, brother, or sister with an immune system problem?   No   In the past 3 months, have you taken medications that affect  your immune system, such as prednisone, other steroids, or anticancer drugs; drugs for the treatment of rheumatoid arthritis, Crohn s disease, or psoriasis; or have you had radiation treatments?   No   Have you had a seizure, or a brain or other nervous system problem?   No   During the past year, have you received a transfusion of blood or blood    products, or been given immune (gamma) globulin or antiviral drug?   No   For women: Are you pregnant or is there a chance you could become       pregnant during the next month?   No   Have you received any vaccinations in the past 4 weeks?   No     Immunization questionnaire answers were all negative.        Per orders of Dr. Junior, injection of Flushot and Bivalent Moderna  given by Blanka Avery CMA. Patient instructed to remain in clinic for 15 minutes afterwards, and to report any adverse reaction to me immediately.       Screening performed by Blanka Avery CMA on 9/27/2022 at 4:22 PM.

## 2022-09-28 ENCOUNTER — APPOINTMENT (OUTPATIENT)
Dept: ULTRASOUND IMAGING | Facility: CLINIC | Age: 62
End: 2022-09-28
Attending: EMERGENCY MEDICINE
Payer: COMMERCIAL

## 2022-09-28 VITALS
HEART RATE: 78 BPM | DIASTOLIC BLOOD PRESSURE: 59 MMHG | RESPIRATION RATE: 18 BRPM | OXYGEN SATURATION: 94 % | SYSTOLIC BLOOD PRESSURE: 114 MMHG | TEMPERATURE: 98.3 F

## 2022-09-28 LAB — SARS-COV-2 RNA RESP QL NAA+PROBE: NEGATIVE

## 2022-09-28 PROCEDURE — 76856 US EXAM PELVIC COMPLETE: CPT

## 2022-09-28 PROCEDURE — 258N000003 HC RX IP 258 OP 636: Performed by: EMERGENCY MEDICINE

## 2022-09-28 PROCEDURE — 250N000011 HC RX IP 250 OP 636: Performed by: EMERGENCY MEDICINE

## 2022-09-28 PROCEDURE — 96361 HYDRATE IV INFUSION ADD-ON: CPT | Performed by: EMERGENCY MEDICINE

## 2022-09-28 PROCEDURE — 96376 TX/PRO/DX INJ SAME DRUG ADON: CPT | Performed by: EMERGENCY MEDICINE

## 2022-09-28 RX ADMIN — SODIUM CHLORIDE: 9 INJECTION, SOLUTION INTRAVENOUS at 00:04

## 2022-09-28 RX ADMIN — HYDROMORPHONE HYDROCHLORIDE 0.5 MG: 1 INJECTION, SOLUTION INTRAMUSCULAR; INTRAVENOUS; SUBCUTANEOUS at 01:13

## 2022-09-28 RX ADMIN — ONDANSETRON 4 MG: 2 INJECTION INTRAMUSCULAR; INTRAVENOUS at 01:13

## 2022-09-28 ASSESSMENT — ACTIVITIES OF DAILY LIVING (ADL): ADLS_ACUITY_SCORE: 37

## 2022-09-28 NOTE — PATIENT INSTRUCTIONS
"Recommendations from today's MTM visit:                                                        1. Increase acetaminophen to 1000mg twice daily AM and PM with 500-1000mg dose midday as needed for pain   2. Continue working on reducing sugar intake, particularly at [facility] in [place]  3. Flu and Moderna Covid booster today  4. Recommended have further workup on flank pain at urgent care  5. Schedule psychiatry appointment to establish care    Follow-up: 1 month with Eugenia, 2 months to establish care with Dr. Rainey as scheduled    It was great speaking with you today.  I value your experience and would be very thankful for your time in providing feedback in our clinic survey. In the next few days, you may receive an email or text message from [facility] with a link to a survey related to your  clinical pharmacist.\"     To schedule another MTM appointment, please call the clinic directly or you may call the MTM scheduling line at [phone] or toll-free at [phone].     My Clinical Pharmacist's contact information:                                                      Please feel free to contact me with any questions or concerns you have.      Eugenia De Jesus, PharmD, BCACP   Medication Management Pharmacist   [facility]  [phone]    "

## 2022-09-28 NOTE — ED PROVIDER NOTES
ED Provider Note  [facility]      History     Chief Complaint   Patient presents with     Abdominal Pain     RLQ pain for a week, pt went to urgent care, labs were done revealing no remarkable findings, pt was advised to come into ED     The history is provided by the patient and medical records.     Nena Tang is a 62 year old female with a history of coronary disease, adult-onset diabetes, tardive dyskinesia, chronic pain syndrome. She presents to the emergency room with complaints of some right lower quadrant abdominal pain over the last week.  Patient was seen in urgent care earlier today where she underwent laboratory testing revealing a urinalysis showing 3-5 white cells and no red cells with a few bacteria seen and culture is currently pending.  The patient was in enough discomfort that she was sent to the ER here for evaluation.  Patient denies any UTI symptoms and any diarrhea or vomiting but complains of right flank pain.    This part of the medical record was transcribed by Sophie Kelley, Medical Scribe, from a dictation done by Jose Pulido Md.      PAST MEDICAL HISTORY:   Past Medical History:   Diagnosis Date     Acute respiratory failure with hypoxia (H) 9/4/2017     CAD (coronary artery disease)     5/2014 cath, nonbostructive stenosis to LAD, RCA.     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/2011     Noncompliance with medication regimen 6/8/2011     Obesity      CINDY (obstructive sleep apnea) 3/8/2012    uses CPAP     CINDY (obstructive sleep apnea)- mild AHI 10.3      Osteopenia 10/7/2009     Pneumonia of right lower lobe due to  infectious organism 9/4/2017     Schizoaffective disorder, depressive type (H) 2/25/2013     Sepsis (H) 8/29/2017     Suicidal intent 10/2/2013     Takotsubo cardiomyopathy      Type 2 diabetes mellitus (H) 8/30/2011     Uterine cancer (H) 1983     Verbal auditory hallucination 10/4/2012       PAST SURGICAL HISTORY:   Past Surgical History:   Procedure Laterality Date     CATARACT IOL, RT/LT Bilateral 2017     CHOLECYSTECTOMY       COLONOSCOPY N/A 3/16/2017    Procedure: COLONOSCOPY;  Surgeon: Traci Gonzalez MD;  Location:  GI     Coronary CTA  5/21/2014     HYSTERECTOMY  1983    uterine cancer yearly pap's per provider.     HYSTERECTOMY       LAPAROSCOPIC CHOLECYSTECTOMY  2008     PHACOEMULSIFICATION CLEAR CORNEA WITH STANDARD INTRAOCULAR LENS IMPLANT Left 5/5/2017    Procedure: PHACOEMULSIFICATION CLEAR CORNEA WITH STANDARD INTRAOCULAR LENS IMPLANT;  LEFT EYE PHACOEMULSIFICATION CLEAR CORNEA WITH STANDARD INTRAOCULAR LENS IMPLANT ;  Surgeon: Tyra Diaz MD;  Location:  [facility]     PHACOEMULSIFICATION CLEAR CORNEA WITH STANDARD INTRAOCULAR LENS IMPLANT Right 6/30/2017    Procedure: PHACOEMULSIFICATION CLEAR CORNEA WITH STANDARD INTRAOCULAR LENS IMPLANT;  RIGHT EYE PHACOEMULSIFICATION CLEAR CORNEA WITH STANDARD INTRAOCULAR LENS IMPLANT;  Surgeon: Tyra Diaz MD;  Location:  [facility]     RELEASE TRIGGER FINGER  10/11/2012    Left thumb. Procedure: RELEASE TRIGGER FINGER;  LEFT THUMB TRIGGER RELEASE;  Surgeon: aTy Langley MD;  Location:  [facility]     RELEASE TRIGGER FINGER Right 12/26/2016    Procedure: RELEASE TRIGGER FINGER;  Surgeon: Albino Castañeda MD;  Location:  OR     [facility] OOPHORECTORMY FOR MALIG, W/BX  1983    UTERINE       FAMILY HISTORY:   Family History   Problem Relation Age of Onset     Cancer Mother         BLADDER     Respiratory Mother         COPD     Gastrointestinal Disease Mother         CIRRHOSIS OF LI BOLIVAR     Alcohol/Drug Mother      Diabetes Mother      Hypertension  Mother      Lipids Mother      C.A.D. Mother      Glaucoma Mother      Alcohol/Drug Sister      Mental Illness Sister      Alcohol/Drug Sister      Psychotic Disorder Sister      Cancer Maternal Grandmother         UNKNOWN TYPE     Cancer Brother         COLON     Cancer - colorectal Brother         IN HIS LATE 30S     Alcohol/Drug Brother          OF HEROIN OVERDOSE AT AGE 22 YRS     Macular Degeneration No family hx of        SOCIAL HISTORY:   Social History     Tobacco Use     Smoking status: Never Smoker     Smokeless tobacco: Never Used   Substance Use Topics     Alcohol use: No     Comment: last month     Allergies   Allergen Reactions     Imidazole Antifungals Hives     Tolerates diflucan     Ketoprofen Itching     Pruritis to topical     Lisinopril Hives     Metformin Other (See Comments)     Patient hospitalized for lactic acidosis - admitting provider suspectd caused by metformin     Metronidazole Hives     Posaconazole Hives     Tolerates diflucan           Dose / Directions   acetaminophen 500 MG tablet  Commonly known as: TYLENOL  Used for: Chronic pain of both shoulders      Take 1000mg in the morning and 1000g at night. Take 1 additional dose of 500-1000mg mid-day as needed  Quantity: 200 tablet  Refills: 11     alendronate 70 MG tablet  Commonly known as: FOSAMAX  Used for: Osteoporosis without current pathological fracture, unspecified osteoporosis type      Dose: 70 mg  Take 1 tablet (70 mg) by mouth every 7 days  - Take with water, 30 minutes before breakfast. Do not take with any other medicines. Sit upright for 30 minutes after taking.  Quantity: 12 tablet  Refills: 3     amantadine 100 MG capsule  Commonly known as: SYMMETREL  Used for: Schizoaffective disorder, depressive type (H)      Take 1 capsule in the morning and 2 capsules in the evening  Quantity: 84 capsule  Refills: 3     aspirin 81 MG EC tablet  Commonly known as: ASA  Used for: Coronary artery disease involving native  heart with angina pectoris, unspecified vessel or lesion type (H)      TAKE 1 TABLET (81MG) BY MOUTH DAILY  Quantity: 90 tablet  Refills: 1     atorvastatin 80 MG tablet  Commonly known as: LIPITOR  Used for: Hyperlipidemia LDL goal <100      TAKE 1 TABLET (80MG) BY MOUTH DAILY  Quantity: 30 tablet  Refills: 11     calcium carbonate 1500 (600 Ca) MG tablet  Commonly known as: OS-ALLEN  Used for: Osteoporosis without current pathological fracture, unspecified osteoporosis type      Dose: 600 mg  Take 1 tablet (600 mg) by mouth daily  Quantity: 90 tablet  Refills: 3     clonazePAM 0.5 MG tablet  Commonly known as: klonoPIN  Used for: Schizoaffective disorder, bipolar type (H)      Dose: 0.5 mg  Take 1 tablet (0.5 mg) by mouth nightly as needed for anxiety or sleep  Quantity: 30 tablet  Refills: 0     diclofenac 1 % topical gel  Commonly known as: VOLTAREN  Used for: Rib pain on right side      Apply 4 grams to painful area at bedtime. May use an additional 3 doses during the day as needed  Quantity: 100 g  Refills: 1     escitalopram 10 MG tablet  Commonly known as: LEXAPRO  Used for: Other schizophrenia (H)      Dose: 10 mg  Take 1 tablet (10 mg) by mouth daily  Quantity: 28 tablet  Refills: 11     gabapentin 300 MG capsule  Commonly known as: NEURONTIN  Used for: Schizoaffective disorder, depressive type (H), Chronic right-sided low back pain without sciatica      Dose: 300 mg  Take 1 capsule (300 mg) by mouth At Bedtime  Quantity: 30 capsule  Refills: 3     hydrOXYzine 25 MG capsule  Commonly known as: VISTARIL  Used for: Psychophysiological insomnia      Dose: 25 mg  Take 1 capsule (25 mg) by mouth every 4 hours as needed for anxiety May take nightly for sleep  Quantity: 60 capsule  Refills: 3     insulin glargine 100 UNIT/ML pen  Commonly known as: LANTUS PEN  Used for: Type 2 diabetes mellitus with mild nonproliferative retinopathy without macular edema, with long-term current use of insulin, unspecified  laterality (H)      Dose: 33 Units  Inject 33 Units Subcutaneous 2 times daily  Quantity: 60 mL  Refills: 1     insulin lispro 100 UNIT/ML (1 unit dial) KWIKPEN  Commonly known as: HumaLOG KWIKpen  Used for: Type 2 diabetes mellitus with mild nonproliferative retinopathy without macular edema, with long-term current use of insulin, unspecified laterality (H)      Take 8 units 3 times a day with meals plus sliding scale. Add 3 units for every 50mg/dL glucose over 150 with max 23 units/meal. Use 4 units with a sugary snack if more than 4 hours has elapsed between doses of Humalog. Hold for glucose less than 80.  Quantity: 15 mL  Refills: 1     losartan 100 MG tablet  Commonly known as: COZAAR  Used for: Coronary artery disease involving native heart with angina pectoris, unspecified vessel or lesion type (H)      Dose: 100 mg  Take 1 tablet (100 mg) by mouth daily  Quantity: 90 tablet  Refills: 0     metoprolol succinate ER 50 MG 24 hr tablet  Commonly known as: TOPROL XL  Used for: HTN, goal below 140/90      Take 50mg in the morning and 100mg at night  Quantity: 120 tablet  Refills: 3     mirabegron 25 MG 24 hr tablet  Commonly known as: Myrbetriq  Used for: Urinary frequency      Dose: 25 mg  Take 1 tablet (25 mg) by mouth daily  Quantity: 90 tablet  Refills: 3     nystatin [id number] UNIT/GM external powder  Commonly known as: MYCOSTATIN  Used for: Rash and nonspecific skin eruption      Apply topically 2 times daily as needed  Quantity: 45 g  Refills: 1     ondansetron 4 MG tablet  Commonly known as: ZOFRAN  Used for: Nausea      Dose: 4 mg  Take 1 tablet (4 mg) by mouth every 8 hours as needed for nausea  Quantity: 10 tablet  Refills: 1     paliperidone ER 9 MG 24 hr tablet  Commonly known as: INVEGA  Used for: Schizoaffective disorder, depressive type (H)      Dose: 9 mg  Take 1 tablet (9 mg) by mouth At Bedtime  Quantity: 28 tablet  Refills: 3     pantoprazole 40 MG EC tablet  Commonly known as: PROTONIX  Used  for: Gastroesophageal reflux disease without esophagitis      Dose: 40 mg  Take 1 tablet (40 mg) by mouth daily  Quantity: 90 tablet  Refills: 1     QUEtiapine 25 MG tablet  Commonly known as: SEROquel  Used for: Schizoaffective disorder, depressive type (H)      Dose: 25 mg  Take 1 tablet (25 mg) by mouth At Bedtime  Quantity: 30 tablet  Refills: 1     senna-docusate 8.6-50 MG tablet  Commonly known as: SENOKOT-S/PERICOLACE  Used for: Constipation, unspecified constipation type      Take 1 tablet daily and take an extra tablet with constipation.  Quantity: 60 tablet  Refills: 10     topiramate 200 MG tablet  Commonly known as: TOPAMAX  Used for: Fibromyalgia, Chronic pain syndrome      Dose: 200 mg  Take 1 tablet (200 mg) by mouth daily  Quantity: 30 tablet  Refills: 1     traZODone 100 MG tablet  Commonly known as: DESYREL  Used for: Schizoaffective disorder, depressive type (H)      Dose: 100 mg  Take 1 tablet (100 mg) by mouth At Bedtime  Quantity: 90 tablet  Refills: 1     Trulicity 3 MG/0.5ML Sopn  Used for: Type 2 diabetes mellitus with hyperglycemia, with long-term current use of insulin (H)  Generic drug: Dulaglutide      Dose: 3 mg  Inject 3 mg Subcutaneous every 7 days  Quantity: 2 mL  Refills: 3     vitamin C 500 MG tablet  Commonly known as: ASCORBIC ACID  Used for: Dysuria      TAKE 2 TABLETS BY MOUTH IN THE MORNING AND TAKE 2 TABLETS BY MOUTH IN THE EVENING  Quantity: 360 tablet  Refills: 1     zinc oxide 20 % external ointment  Commonly known as: DESITIN  Used for: Skin irritation      Apply topically 3 times daily as needed for irritation (barrier cream)  Quantity: 60 g  Refills: 3        CONTINUE these medicines which have NOT CHANGED      Dose / Directions   blood glucose test strip  Commonly known as: NO BRAND SPECIFIED  Used for: Type 2 diabetes mellitus with stage 3 chronic kidney disease, with long-term current use of insulin, unspecified whether stage 3a or 3b CKD (H)      Use to test blood  sugar 10 times daily or as directed.  Quantity: 100 strip  Refills: 11     Dexcom G6 Sensor Misc  Used for: Type 2 diabetes mellitus with mild nonproliferative retinopathy without macular edema, with long-term current use of insulin, unspecified laterality (H)      Change every 10 days.  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  Quantity: 1 each  Refills: 0     order for DME  Used for: Mixed incontinence      Equipment being ordered: Depends.  Quantity: 30 each  Refills: 4     thin lancets  Commonly known as: NO BRAND SPECIFIED  Used for: Type 2 diabetes mellitus with mild nonproliferative retinopathy without macular edema, with long-term current use of insulin, unspecified laterality (H)      Use with lanceting device. To accompany: Blood Glucose Monitor Brands: per insurance.  Quantity: 100 each  Refills: 6               Review of Systems   Constitutional: Negative.    HENT: Negative.    Eyes: Negative.    Respiratory: Negative.    Cardiovascular: Negative.     Gastrointestinal: Positive for abdominal pain (right flank pain). Negative for diarrhea and vomiting.   Endocrine: Negative.    Genitourinary: Negative.    Musculoskeletal: Negative.    Skin: Negative.    Allergic/Immunologic: Negative.    Neurological: Negative.    Hematological: Negative.    Psychiatric/Behavioral: Negative.      A complete review of systems was performed with pertinent positives and negatives noted in the HPI, and all other systems negative.    Physical Exam   BP: (!) 152/77  Pulse: 79  Temp: 98.3  F (36.8  C)  Resp: 18  SpO2: 98 %  Physical Exam  Vitals and nursing note reviewed.   HENT:      Head: Atraumatic.   Chest:      Chest wall: No tenderness.   Abdominal:      Comments: Soft and nontender    Patient does have right CVA tenderness however.   Skin:     Findings: No rash.   Neurological:      General: No focal deficit present.      Mental Status: She is alert and oriented to person, place, and time.   Psychiatric:         Mood and Affect: Mood normal.         ED Course      Procedures       Results for orders placed or performed during the hospital encounter of 09/27/22   Abd/pelvis CT no contrast - Stone Protocol     Status: None    Narrative    EXAM: CT ABDOMEN PELVIS W/O CONTRAST  LOCATION: [facility]  DATE/TIME: 9/27/2022 11:00 PM    INDICATION: right flank pain  COMPARISON: CT abdomen and pelvis 6/15/2021  TECHNIQUE: CT scan of the abdomen and pelvis was performed without IV contrast. Multiplanar reformats were obtained. Dose reduction techniques were used.  CONTRAST: None.    FINDINGS:   LOWER CHEST: Normal.    HEPATOBILIARY: Cholecystectomy.    PANCREAS: Normal.    SPLEEN: Normal.    ADRENAL GLANDS: Normal.    KIDNEYS/BLADDER: 2 mm calyceal tip nonobstructing stone lower pole right kidney. The left kidney is normal. No bladder stones.    BOWEL: Normal appendix. No evidence for bowel obstruction. No bowel wall thickening or inflammatory  changes.    LYMPH NODES: Normal.    VASCULATURE: Atherosclerotic disease abdominal aorta and iliac arteries.    PELVIC ORGANS: Hysterectomy.    MUSCULOSKELETAL: Hypertrophic changes lower thoracic and upper lumbar spine. Diastases recti.      Impression    IMPRESSION:   1.  2 mm nonobstructing stone lower pole right kidney.  2.  Normal appendix.   3.  No findings to account for the patient's right flank pain.     Comprehensive metabolic panel     Status: Abnormal   Result Value Ref Range    Sodium 140 133 - 144 mmol/L    Potassium 4.7 3.4 - 5.3 mmol/L    Chloride 110 (H) 94 - 109 mmol/L    Carbon Dioxide (CO2) 23 20 - 32 mmol/L    Anion Gap 7 3 - 14 mmol/L    Urea Nitrogen 23 7 - 30 mg/dL    Creatinine 1.14 (H) 0.52 - 1.04 mg/dL    Calcium 9.4 8.5 - 10.1 mg/dL    Glucose 110 (H) 70 - 99 mg/dL    Alkaline Phosphatase 93 40 - 150 U/L    AST 21 0 - 45 U/L    ALT 28 0 - 50 U/L    Protein Total 8.5 6.8 - 8.8 g/dL    Albumin 4.1 3.4 - 5.0 g/dL    Bilirubin Total 0.3 0.2 - 1.3 mg/dL    GFR Estimate 55 (L) >60 mL/min/1.73m2   INR     Status: Normal   Result Value Ref Range    INR 0.88 0.85 - 1.15   Asymptomatic COVID-19 Virus (Coronavirus) by PCR Nasopharyngeal     Status: Normal    Specimen: Nasopharyngeal; Swab   Result Value Ref Range    SARS CoV2 PCR Negative Negative    Narrative    Testing was performed using the Xpert Xpress SARS-CoV-2 Assay on the   Cepheid Gene-Xpert Instrument Systems. Additional information about   this Emergency Use Authorization (EUA) assay can be found via the Lab   Guide. This test should be ordered for the detection of SARS-CoV-2 in   individuals who meet SARS-CoV-2 clinical and/or epidemiological   criteria. Test performance is unknown in asymptomatic patients. This   test is for in vitro diagnostic use under the FDA EUA for   laboratories certified under CLIA to perform high complexity testing.   This test has not been FDA cleared or approved. A negative result   does not rule out the  presence of PCR inhibitors in the specimen or   target RNA in concentration below the limit of detection for the   assay. The possibility of a false negative should be considered if   the patient's recent exposure or clinical presentation suggests   COVID-19. This test was validated by the [facility]. This laboratory is certified under the Clinical Laboratory Improvement Amendments of 1988 (CLIA-88) as qualified to perform high complexity laboratory testing.     UA with Microscopic reflex to Culture     Status: Abnormal    Specimen: Urine, Midstream   Result Value Ref Range    Color Urine Straw Colorless, Straw, Light Yellow, Yellow    Appearance Urine Clear Clear    Glucose Urine Negative Negative mg/dL    Bilirubin Urine Negative Negative    Ketones Urine Negative Negative mg/dL    Specific Gravity Urine 1.015 1.003 - 1.035    Blood Urine Negative Negative    pH Urine 6.5 5.0 - 7.0    Protein Albumin Urine Negative Negative mg/dL    Urobilinogen Urine Normal Normal, 2.0 mg/dL    Nitrite Urine Negative Negative    Leukocyte Esterase Urine Trace (A) Negative    Bacteria Urine Few (A) None Seen /HPF    Mucus Urine Present (A) None Seen /LPF    RBC Urine <1 <=2 /HPF    WBC Urine 1 <=5 /HPF    Squamous Epithelials Urine 1 <=1 /HPF    Narrative    Urine Culture not indicated   CBC with platelets and differential     Status: Abnormal   Result Value Ref Range    WBC Count 6.8 4.0 - 11.0 10e3/uL    RBC Count 3.70 (L) 3.80 - 5.20 10e6/uL    Hemoglobin 11.7 11.7 - 15.7 g/dL    Hematocrit 35.5 35.0 - 47.0 %    MCV 96 78 - 100 fL    MCH 31.6 26.5 - 33.0 pg    MCHC 33.0 31.5 - 36.5 g/dL    RDW 12.6 10.0 - 15.0 %    Platelet Count 209 150 - 450 10e3/uL    % Neutrophils 44 %    % Lymphocytes 42 %    % Monocytes 11 %    % Eosinophils 2 %    % Basophils 1 %    % Immature Granulocytes 0 %    NRBCs per 100 WBC 0 <1 /100    Absolute Neutrophils 3.0 1.6 - 8.3 10e3/uL    Absolute Lymphocytes 2.9 0.8  - 5.3 10e3/uL    Absolute Monocytes 0.7 0.0 - 1.3 10e3/uL    Absolute Eosinophils 0.1 0.0 - 0.7 10e3/uL    Absolute Basophils 0.0 0.0 - 0.2 10e3/uL    Absolute Immature Granulocytes 0.0 <=0.4 10e3/uL    Absolute NRBCs 0.0 10e3/uL   CBC with platelets differential     Status: Abnormal    Narrative    The following orders were created for panel order CBC with platelets differential.  Procedure                               Abnormality         Status                     ---------                               -----------         ------                     CBC with platelets and d...[[id number]]  Abnormal            Final result                 Please view results for these tests on the individual orders.     Medications   sodium chloride 0.9% infusion ( Intravenous New Bag 9/28/22 0004)   ondansetron (ZOFRAN) injection 4 mg (4 mg Intravenous Given 9/27/22 2237)   HYDROmorphone (PF) (DILAUDID) injection 0.5 mg (0.5 mg Intravenous Given 9/27/22 2237)   0.9% sodium chloride BOLUS (0 mLs Intravenous Stopped 9/28/22 0001)       Orders Placed This Encounter   Procedures     Abd/pelvis CT no contrast - Stone Protocol     US Pelvis Cmplt w Transvag & Doppler LmtPel Duplex Limited     Comprehensive metabolic panel     INR     Asymptomatic COVID-19 Virus (Coronavirus) by PCR     UA with Microscopic reflex to Culture     CBC with platelets and differential     Peripheral IV: Standard     CBC with platelets differential         Assessments & Plan (with Medical Decision Making)     I have reviewed the nursing notes.    So far the patient's evaluation for her right flank pain has been fairly unremarkable with differential diagnosis of nephrolithiasis, pyelonephritis pretty much ruled out.  Patient is currently undergoing a pelvic ultrasound to rule out right ovarian torsion as a cause of her right CVA pain.  The results of the ultrasound are currently pending at this time the case will be signed out to my partners who will follow up  with the final results and arrange for the final disposition for this patient.      Working diagnoses:   Flank pain       --  Jose Pulido Md  [facility] EMERGENCY DEPARTMENT  9/27/2022     Jose Pulido MD  09/28/22 0056

## 2022-09-28 NOTE — DISCHARGE INSTRUCTIONS
The exact cause of your symptoms is not entirely clear.  Your work-up in the emergency department including urine sample, labs, CT scan, ultrasound, did not reveal any medical emergencies.  I recommend ibuprofen/Tylenol for pain at home.  Apply ice to the affected area.  Follow-up with your primary care doctor in 3 to 5 days for reassessment.  Return to the ED with any new or worsening symptoms.

## 2022-09-28 NOTE — ED NOTES
Patient received in signout from Dr. Pulido.  See his note for full documentation.  In short, patient presents to the ED with right flank pain and has right CVA tenderness on exam.  Urinalysis negative.  Laboratory work-up reassuring.  CT scan is negative.  Plan on signout is follow-up with pelvic ultrasound results and discharge if negative    US Pelvis Cmpl wo Transvaginal w Abd/Pel Duplex Lmt   Final Result   IMPRESSION:     1.  Left ovary obscured by bowel gas.   2.  Otherwise unremarkable pelvic ultrasound.               Abd/pelvis CT no contrast - Stone Protocol   Final Result   IMPRESSION:    1.  2 mm nonobstructing stone lower pole right kidney.   2.  Normal appendix.    3.  No findings to account for the patient's right flank pain.             As above, ultrasound does not show any evidence of ovarian torsion.  The left ovary is obscured by gas, however, patient has no left-sided abdominal pain and thus there is no concern for left-sided variant torsion.  Patient's pain is well controlled in the ED.  Discharge in good condition with plan for PCP follow-up.  Educated reasons to return to the ED.     Travis Glover DO  09/28/22 0216

## 2022-10-11 ENCOUNTER — TELEPHONE (OUTPATIENT)
Dept: BEHAVIORAL HEALTH | Facility: CLINIC | Age: 62
End: 2022-10-11

## 2022-10-11 NOTE — TELEPHONE ENCOUNTER
Spoke with pt to remind them about their video appt on Monday 10/17/22 at 11 am, that will be connect through via My Chart.    Writer also mentioned that, pt will need to login and complete e-check in questions 15 - 30 min prior to appt time

## 2022-10-16 ASSESSMENT — PATIENT HEALTH QUESTIONNAIRE - PHQ9
SUM OF ALL RESPONSES TO PHQ QUESTIONS 1-9: 14
10. IF YOU CHECKED OFF ANY PROBLEMS, HOW DIFFICULT HAVE THESE PROBLEMS MADE IT FOR YOU TO DO YOUR WORK, TAKE CARE OF THINGS AT HOME, OR GET ALONG WITH OTHER PEOPLE: SOMEWHAT DIFFICULT
SUM OF ALL RESPONSES TO PHQ QUESTIONS 1-9: 14

## 2022-10-17 ENCOUNTER — VIRTUAL VISIT (OUTPATIENT)
Dept: PSYCHOLOGY | Facility: CLINIC | Age: 62
End: 2022-10-17
Attending: NURSE PRACTITIONER
Payer: COMMERCIAL

## 2022-10-17 DIAGNOSIS — F33.1 MDD (MAJOR DEPRESSIVE DISORDER), RECURRENT EPISODE, MODERATE (H): Primary | ICD-10-CM

## 2022-10-17 PROCEDURE — 90791 PSYCH DIAGNOSTIC EVALUATION: CPT | Mod: 95

## 2022-10-17 ASSESSMENT — COLUMBIA-SUICIDE SEVERITY RATING SCALE - C-SSRS
3. HAVE YOU BEEN THINKING ABOUT HOW YOU MIGHT KILL YOURSELF?: NO
2. HAVE YOU ACTUALLY HAD ANY THOUGHTS OF KILLING YOURSELF IN THE PAST MONTH?: NO
4. HAVE YOU HAD THESE THOUGHTS AND HAD SOME INTENTION OF ACTING ON THEM?: NO
1. IN THE PAST MONTH, HAVE YOU WISHED YOU WERE DEAD OR WISHED YOU COULD GO TO SLEEP AND NOT WAKE UP?: NO
6. HAVE YOU EVER DONE ANYTHING, STARTED TO DO ANYTHING, OR PREPARED TO DO ANYTHING TO END YOUR LIFE?: YES
5. HAVE YOU STARTED TO WORK OUT OR WORKED OUT THE DETAILS OF HOW TO KILL YOURSELF? DO YOU INTEND TO CARRY OUT THIS PLAN?: NO

## 2022-10-17 NOTE — PROGRESS NOTES
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 EPISODE - SELECT THESE CRITERIA  A) Recurrent episode(s) - symptoms have been present during the same 2-week period and represent a change from previous functioning 5 or more symptoms (required for diagnosis)   - Depressed mood. Note: In children and adolescents, can be irritable mood.     - Diminished interest or pleasure in all, or almost all, activities.    - Significant weight gainincrease in appetite.    - Decreased sleep.    - Psychomotor activity retardation.    - Fatigue or loss of energy.    - Feelings of worthlessness or inappropriate and excessive guilt.    - Diminished ability to think or concentrate, or indecisiveness.    - Recurrent thoughts of death (not just fear of dying), recurrent suicidal ideation without a specific plan, or a suicide attempt or a specific plan for committing suicide.   B) The symptoms cause clinically significant distress or impairment in social, occupational, or other important areas of functioning  C) The episode is not attributable to the physiological effects of a substance or to another medical condition  D) The occurence of major depressive episode is not better explained by other thought / psychotic  disorders      Functional Status:  Patient reports the following functional impairments:  management of the household and or completion of tasks and self-care.     Nonprogrammatic care:  Patient is requesting basic services to address current mental health concerns.    Clinical Summary:  1. Reason for assessment: depression/anxiety  .  2. Psychosocial, Cultural and Contextual Factors: trauma, loss, addction hx, multiple suicide attempts, hx of SPMI dx .  3. Principal DSM5 Diagnoses  (Sustained by DSM5 Criteria Listed Above):   296.32 (F33.1) Major Depressive Disorder, Recurrent Episode, Moderate With anxious distress.  4. Other Diagnoses that is relevant to services:   295.70  (F25.1) Schizoaffective Disorder Depressive Type.  5. Provisional Diagnosis:  296.32 (F33.1) Major Depressive Disorder, Recurrent Episode, Moderate With anxious distress as evidenced by ROS .  6. Prognosis: Relieve Acute Symptoms.  7. Likely consequences of symptoms if not treated: further decompensation.  8. Client strengths include:  committed to sobriety and wants to learn .     Recommendations:     1. Plan for Safety and Risk Management:   Safety and Risk: Recommended that patient call 911 or go to the local ED should there be a change in any of these risk factors..          Report to child / adult protection services was NA.     2. Patient's identified hx of SI - attempts and acute grief/loss will be addressed in sessions.     3. Initial Treatment will focus on:    Mood Instability - mgt.     4. Resources/Service Plan:    services are not indicated.   Modifications to assist communication are not indicated.   Additional disability accommodations are not indicated.      5. Collaboration:   Collaboration / coordination of treatment will be initiated with the following  support professionals: psychiatry.      6.  Referrals:   The following referral(s) will be initiated: [facility]. Next Scheduled Appointment:  10/27/22.     A Release of Information has been obtained for the following: none.     Emergency Contact  was not obtained.     7. JESS:    JESS:  Discussed the general effects of drugs and alcohol on health and well-being. Provider gave patient printed information about the effects of chemical use on their health and well being. Recommendations:  n/a .     8. Records:   These were reviewed at time of assessment.   Information in this assessment was obtained from the medical record and  provided by patient who is a limited historian.    Patient will have open access to their mental health medical record.        Provider Name/ Credentials:  Aniya PETERS [facility]  October 17, 2022          Answers for HPI/ROS submitted by the patient on 10/16/2022  If you checked off any problems, how difficult have these problems made it for you to do your work, take care of things at home, or get along with other people?: Somewhat difficult  PHQ9 TOTAL SCORE: 14

## 2022-10-18 DIAGNOSIS — Z79.4 TYPE 2 DIABETES MELLITUS WITH HYPERGLYCEMIA, WITH LONG-TERM CURRENT USE OF INSULIN (H): ICD-10-CM

## 2022-10-18 DIAGNOSIS — F25.0 SCHIZOAFFECTIVE DISORDER, BIPOLAR TYPE (H): ICD-10-CM

## 2022-10-18 DIAGNOSIS — G89.4 CHRONIC PAIN SYNDROME: ICD-10-CM

## 2022-10-18 DIAGNOSIS — K21.9 GASTROESOPHAGEAL REFLUX DISEASE WITHOUT ESOPHAGITIS: ICD-10-CM

## 2022-10-18 DIAGNOSIS — E11.65 TYPE 2 DIABETES MELLITUS WITH HYPERGLYCEMIA, WITH LONG-TERM CURRENT USE OF INSULIN (H): ICD-10-CM

## 2022-10-18 DIAGNOSIS — M79.7 FIBROMYALGIA: ICD-10-CM

## 2022-10-18 RX ORDER — PANTOPRAZOLE SODIUM 40 MG/1
40 TABLET, DELAYED RELEASE ORAL DAILY
Qty: 28 TABLET | Refills: 0 | Status: SHIPPED | OUTPATIENT
Start: 2022-10-18 | End: 2022-11-14

## 2022-10-18 RX ORDER — CLONAZEPAM 0.5 MG/1
TABLET ORAL
Qty: 28 TABLET | Refills: 1 | Status: SHIPPED | OUTPATIENT
Start: 2022-10-18 | End: 2022-12-07

## 2022-10-18 RX ORDER — TOPIRAMATE 200 MG/1
TABLET, FILM COATED ORAL
Qty: 28 TABLET | Refills: 11 | Status: SHIPPED | OUTPATIENT
Start: 2022-10-18 | End: 2023-05-23

## 2022-10-18 RX ORDER — DULAGLUTIDE 3 MG/.5ML
INJECTION, SOLUTION SUBCUTANEOUS
Qty: 2 ML | Refills: 3 | Status: SHIPPED | OUTPATIENT
Start: 2022-10-18 | End: 2023-01-30

## 2022-10-18 NOTE — TELEPHONE ENCOUNTER
"Requested Prescriptions   Pending Prescriptions Disp Refills     pantoprazole (PROTONIX) 40 MG EC tablet [Pharmacy Med Name: Pantoprazole Sodium 40MG TBEC] 28 tablet      Sig: TAKE 1 TABLET (40 MG) BY MOUTH DAILY       PPI Protocol Passed - 10/18/2022  3:59 PM        Passed - Not on Clopidogrel (unless Pantoprazole ordered)        Passed - No diagnosis of osteoporosis on record        Passed - Recent (12 mo) or future (30 days) visit within the authorizing provider's specialty     Patient has had an office visit with the authorizing provider or a provider within the authorizing providers department within the previous 12 mos or has a future within next 30 days. See \"Patient Info\" tab in inbasket, or \"Choose Columns\" in Meds & Orders section of the refill encounter.              Passed - Medication is active on med list        Passed - Patient is age 18 or older        Passed - No active pregnacy on record        Passed - No positive pregnancy test in past 12 months           TRULICITY 3 MG/0.5ML SOPN [Pharmacy Med Name: Trulicity 3MG/0.5ML SOPN] 2 mL 3     Sig: INJECT 3MG SUBCUTANEOUSLY EVERY 7 DAYS       GLP-1 Agonists Protocol Failed - 10/18/2022  3:59 PM        Failed - Normal serum creatinine on file in past 12 months     Recent Labs   Lab Test 09/27/22  2216 05/16/19  0658 05/15/19  1834   CR 1.14*   < >  --    CREAT  --   --  0.9    < > = values in this interval not displayed.       Ok to refill medication if creatinine is low          Passed - HgbA1C in past 3 or 6 months     If HgbA1C is 8 or greater, it needs to be on file within the past 3 months.  If less than 8, must be on file within the past 6 months.     Recent Labs   Lab Test 08/17/22  1056 03/24/22  1211 02/25/22  1318   A1C 7.4*   < >  --    95507  --   --  7.7*    < > = values in this interval not displayed.             Passed - Medication is active on med list        Passed - Patient is age 18 or older        Passed - No active pregnancy on " "record        Passed - No positive pregnancy test in past 12 months        Passed - Recent (6 mo) or future (30 days) visit within the authorizing provider's specialty     Patient had office visit in the last 6 months or has a visit in the next 30 days with authorizing provider.  See \"Patient Info\" tab in inbasket, or \"Choose Columns\" in Meds & Orders section of the refill encounter.               topiramate (TOPAMAX) 200 MG tablet [Pharmacy Med Name: Topiramate 200MG TABS] 28 tablet      Sig: TAKE 1 TABLET BY MOUTH DAILY       Anti-Seizure Meds Protocol  Failed - 10/18/2022  3:59 PM        Failed - Review Authorizing provider's last note.      Refer to last progress notes: confirm request is for original authorizing provider (cannot be through other providers).          Failed - Normal CBC on file in past 26 months     Recent Labs   Lab Test 09/27/22 2216   WBC 6.8   RBC 3.70*   HGB 11.7   HCT 35.5                    Passed - Recent (12 mo) or future (30 days) visit within the authorizing provider's specialty     Patient has had an office visit with the authorizing provider or a provider within the authorizing providers department within the previous 12 mos or has a future within next 30 days. See \"Patient Info\" tab in inbasket, or \"Choose Columns\" in Meds & Orders section of the refill encounter.              Passed - Normal ALT or AST on file in past 26 months     Recent Labs   Lab Test 09/27/22  2216   ALT 28     Recent Labs   Lab Test 09/27/22  2216   AST 21             Passed - Normal platelet count on file in past 26 months     Recent Labs   Lab Test 09/27/22  2216                  Passed - Medication is active on med list        Passed - No active pregnancy on record        Passed - No positive pregnancy test in last 12 months         Routing refill request to provider for review/approval because medications did not pass protocol.    Pt has an appointment on 12-12-22    Monique Peters, " RN  [facility]

## 2022-10-19 ENCOUNTER — TRANSFERRED RECORDS (OUTPATIENT)
Dept: HEALTH INFORMATION MANAGEMENT | Facility: CLINIC | Age: 62
End: 2022-10-19

## 2022-10-19 DIAGNOSIS — F25.1 SCHIZOAFFECTIVE DISORDER, DEPRESSIVE TYPE (H): ICD-10-CM

## 2022-10-19 RX ORDER — TRAZODONE HYDROCHLORIDE 100 MG/1
100 TABLET ORAL AT BEDTIME
Qty: 90 TABLET | Refills: 3 | Status: SHIPPED | OUTPATIENT
Start: 2022-10-19 | End: 2023-09-18

## 2022-10-19 RX ORDER — QUETIAPINE FUMARATE 25 MG/1
25 TABLET, FILM COATED ORAL AT BEDTIME
Qty: 30 TABLET | Refills: 11 | Status: SHIPPED | OUTPATIENT
Start: 2022-10-19 | End: 2023-03-14

## 2022-10-19 NOTE — TELEPHONE ENCOUNTER
"Requested Prescriptions   Pending Prescriptions Disp Refills     traZODone (DESYREL) 100 MG tablet 90 tablet 1     Sig: Take 1 tablet (100 mg) by mouth At Bedtime       Serotonin Modulators Passed - 10/19/2022  7:57 AM        Passed - Recent (12 mo) or future (30 days) visit within the authorizing provider's specialty     Patient has had an office visit with the authorizing provider or a provider within the authorizing providers department within the previous 12 mos or has a future within next 30 days. See \"Patient Info\" tab in inbasket, or \"Choose Columns\" in Meds & Orders section of the refill encounter.              Passed - Medication is active on med list        Passed - Patient is age 18 or older        Passed - No active pregnancy on record        Passed - No positive pregnancy test in past 12 months           QUEtiapine (SEROQUEL) 25 MG tablet 30 tablet 1     Sig: Take 1 tablet (25 mg) by mouth At Bedtime       Antipsychotic Medications Passed - 10/19/2022  7:57 AM        Passed - Blood pressure under 140/90 in past 12 months     BP Readings from Last 3 Encounters:   09/28/22 114/59   09/27/22 132/78   09/09/22 132/77                 Passed - Patient is 12 years of age or older        Passed - Lipid panel on file within the past 12 months     Recent Labs   Lab Test 08/17/22  1056 07/13/16  1350 07/14/15  1215   CHOL 165   < > 147   TRIG 148   < > 151*   HDL 56   < > 39*   LDL 79   < > 78   NHDL 109   < >  --    VLDL  --   --  30   CHOLHDLRATIO  --   --  3.8    < > = values in this interval not displayed.               Passed - CBC on file in past 12 months     Recent Labs   Lab Test 09/27/22 2216   WBC 6.8   RBC 3.70*   HGB 11.7   HCT 35.5                    Passed - Heart Rate on file within past 12 months     Pulse Readings from Last 3 Encounters:   09/28/22 78   09/09/22 75   08/17/22 74               Passed - A1c or Glucose on file in past 12 months     Recent Labs   Lab Test 09/27/22 2216 " "08/17/22  1056 03/24/22  1211 02/25/22  1318   * 148*   < >  --    A1C  --  7.4*   < >  --    22664  --   --   --  7.7*    < > = values in this interval not displayed.       Please review patients last 3 weights. If a weight gain of >10 lbs exists, you may refill the prescription once after instructing the patient to schedule an appointment within the next 30 days.    Wt Readings from Last 3 Encounters:   09/09/22 112.9 kg (249 lb)   08/17/22 111.6 kg (246 lb)   07/20/22 110.2 kg (243 lb)             Passed - Medication is active on med list        Passed - Patient is not pregnant        Passed - No positve pregnancy test on file in past 12 months        Passed - Recent (6 mo) or future (30 days) visit within the authorizing provider's specialty     Patient had office visit in the last 6 months or has a visit in the next 30 days with authorizing provider or within the authorizing provider's specialty.  See \"Patient Info\" tab in inbasket, or \"Choose Columns\" in Meds & Orders section of the refill encounter.               Has appt in December with new PCP, previous prescribers no longer with clinic so routing to Dr. Rainey to approve.  Kenzie MURPHY RN    "

## 2022-10-20 ENCOUNTER — MEDICAL CORRESPONDENCE (OUTPATIENT)
Dept: HEALTH INFORMATION MANAGEMENT | Facility: CLINIC | Age: 62
End: 2022-10-20

## 2022-10-25 DIAGNOSIS — Z79.4 TYPE 2 DIABETES MELLITUS WITH MILD NONPROLIFERATIVE RETINOPATHY WITHOUT MACULAR EDEMA, WITH LONG-TERM CURRENT USE OF INSULIN, UNSPECIFIED LATERALITY (H): ICD-10-CM

## 2022-10-25 DIAGNOSIS — E11.3299 TYPE 2 DIABETES MELLITUS WITH MILD NONPROLIFERATIVE RETINOPATHY WITHOUT MACULAR EDEMA, WITH LONG-TERM CURRENT USE OF INSULIN, UNSPECIFIED LATERALITY (H): ICD-10-CM

## 2022-10-25 RX ORDER — ISOPROPYL ALCOHOL 70 ML/100ML
1 SWAB TOPICAL
Qty: 400 EACH | Refills: 3 | Status: SHIPPED | OUTPATIENT
Start: 2022-10-25 | End: 2022-11-02

## 2022-10-27 ENCOUNTER — VIRTUAL VISIT (OUTPATIENT)
Dept: PSYCHOLOGY | Facility: CLINIC | Age: 62
End: 2022-10-27
Payer: COMMERCIAL

## 2022-10-27 DIAGNOSIS — F33.1 MDD (MAJOR DEPRESSIVE DISORDER), RECURRENT EPISODE, MODERATE (H): Primary | ICD-10-CM

## 2022-10-27 PROCEDURE — 90834 PSYTX W PT 45 MINUTES: CPT | Mod: 95

## 2022-10-27 NOTE — PROGRESS NOTES
"    [facility]                                     Progress Note    Patient Name: Nena Tang  Date: 10/27/22         Service Type: Individual      Session Start Time: 1:05 pm  Session End Time: 1:45 pm     Session Length: 40 min    Session #: 2    Attendees: Client attended alone    Service Modality:  Phone Visit:      Provider verified identity through the following two step process.  Patient provided:  Patient is known previously to provider    The patient has been notified of the following:      \"We have found that certain health care needs can be provided without the need for a face to face visit.  This service lets us provide the care you need with a phone conversation.       I will have full access to your [facility] medical record during this entire phone call.   I will be taking notes for your medical record.      Since this is like an office visit, we will bill your insurance company for this service.       There are potential benefits and risks of telephone visits (e.g. limits to patient confidentiality) that differ from in-person visits.?Confidentiality still applies for telephone services, and nobody will record the visit.  It is important to be in a quiet, private space that is free of distractions (including cell phone or other devices) during the visit.??      If during the course of the call I believe a telephone visit is not appropriate, you will not be charged for this service\"     Consent has been obtained for this service by care team member: Yes     DATA  Interactive Complexity: No  Crisis: No        Progress Since Last Session (Related to Symptoms / Goals / Homework):   Symptoms: No change inconsistent sleep-bad dreams, low mood    Homework: not assigned      Episode of Care Goals: No improvement - PRECONTEMPLATION (Not seeing need for change); Intervened by educating the patient about the effects of current behavior on health.  Evoked information about reasons " to continue behavior, express concern / recommendations, and explored any change talk     Current / Ongoing Stressors and Concerns:   Experiencing violent nightmares, attending day program [facility].      Treatment Objective(s) Addressed in This Session:   identify and practice sleep hygiene practices       Intervention:   CBT: Provided psychoeducation on sleep hygiene practices, surfaced opportunities for practice. Shared list via [employer].    Assessments completed prior to visit:  The following assessments were completed by patient for this visit:  PHQ2:   PHQ-2 ( 1999 [employer]) 8/25/2022 7/20/2022 6/21/2022 5/9/2022 4/8/2022 8/24/2021 11/1/2019   Q1: Little interest or pleasure in doing things 0 1 - 1 0 0 1   Q2: Feeling down, depressed or hopeless 0 1 - 1 0 1 1   PHQ-2 Score 0 2 - 2 0 1 2   PHQ-2 Total Score (12-17 Years)- Positive if 3 or more points; Administer PHQ-A if positive - - - - - 1 2   Q1: Little interest or pleasure in doing things - - - Several days - - -   Q2: Feeling down, depressed or hopeless - - - Several days - - -   PHQ-2 Score - - Incomplete 2 - - -     PHQ9:   PHQ-9 SCORE 10/26/2018 5/7/2019 12/1/2020 11/15/2021 6/21/2022 8/17/2022 10/16/2022   PHQ-9 Total Score - - - - - - -   PHQ-9 Total Score [facility] - - - - 15 (Moderately severe depression) 10 (Moderate depression) 14 (Moderate depression)   PHQ-9 Total Score - - - - - - -   PHQ-9 Total Score 20 22 13 0 15 10 14     GAD2:   TAMIA-2 10/16/2022   Feeling nervous, anxious, or on edge 1   Not being able to stop or control worrying 1   TAMIA-2 Total Score 2     GAD7:   TAMIA-7 SCORE 12/20/2016 1/2/2017 2/3/2017 3/13/2018 10/26/2018 4/29/2022 6/21/2022   Total Score - - - - - - -   Total Score - - - - - - 17 (severe anxiety)   Total Score 6 0 0 17 19 11 17   Total Score - - - - - - -     CAGE-AID:   CAGE-AID Total Score 4/29/2022 10/16/2022   Total Score 0 0   Total Score MyChart - 0 (A total score of 2 or greater is considered  "clinically significant)     PROMIS 10-Global Health (only subscores and total score):   PROMIS-10 Scores Only 7/22/2019 11/4/2019 10/16/2022   Global Mental Health Score 7 11 8   Global Physical Health Score 9 11 7   PROMIS TOTAL - SUBSCORES 16 22 15     Jeremiah Suicide Severity Rating Scale (Lifetime/Recent)  Jeremiah Suicide Severity Rating (Lifetime/Recent) 12/31/2019 12/31/2019 1/1/2020 1/1/2020 1/2/2020 7/29/2021 10/17/2022   Wish to be Dead (Lifetime) - - - - - Yes -   Comments - - - - - Pt reports long hx of SI. She notes thoughts of \"I'm better off dead.\" She reports severe epsisodes following the deaths of her , mother, and sister, \"I feel I could have saved them some how.\" -   Non-Specific Active Suicidal Thoughts (Lifetime) - - - - - Yes -   Non-Specific Active Suicidal Thought Description (Lifetime) - - - - - - -   Most Severe Ideation Rating (Lifetime) - - - - - 5 -   Most Severe Ideation Description (Lifetime) - - - - - Pt reports most severe SI following the deaths of her mother and  9-10 years ago. -   Frequency (Lifetime) - - - - - 5 -   Duration (Lifetime) NA - - - - 3 -   Controllability (Lifetime) - - - - - 5 -   Protective Factors  (Lifetime) - - - - - 3 -   Reasons for Ideation (Lifetime) - - - - - 5 -   Q1 Wished to be Dead (Past Month) - - - - - - no   Q2 Suicidal Thoughts (Past Month) - - - - - - no   Q3 Suicidal Thought Method - - - - - - no   Q4 Suicidal Intent without Specific Plan - - - - - - no   Q5 Suicide Intent with Specific Plan - - - - - - no   Q6 Suicide Behavior (Lifetime) - - - - - - yes   Within the Past 3 Months? - - - - - - no   Level of Risk per Screen - - - - - - moderate risk   Do you have guns available to you? - - - - - - -   RETIRED: 1. Wish to be Dead (Recent) No No No No No No -   Comments - - - - - - -   RETIRED: Wish to be Dead Description (Recent) - - - - - - -   RETIRED: 2. Non-Specific Active Suicidal Thoughts (Recent) No No No No No No - "   Comments - - - - - - -   Non-Specific Active Suicidal Thought Description (Recent) - - - - - - -   3. Active Suicidal Ideation with any Methods (Not Plan) Without Intent to Act (Lifetime) - - - - - Yes -   RETIRE: Active Suicidal Ideation with any Methods (Not Plan) Description (Lifetime) - - - - - Pt reports thoughts of overdosing on insulin and cutting her wrists. -   RETIRED: 3. Active Suicidal Ideation with any Methods (Not Plan) Without Intent to Act (Recent) No No - - No No -   RETIRED: Active Suicidal Ideation with any Methods (Not Plan) Description (Recent) - - - - - - -   RETIRE: 4. Active Suicidal Ideation with Some Intent to Act, Without Specific Plan (Lifetime) - - - - - Yes -   4. Active Suicidal Ideation with Some Intent to Act, Without Specific Plan (Recent) No - - - No No -   Active Suicidal Ideation with Some Intent to Act, Without Specific Plan Description (Recent) - - - - - - -   RETIRE: 5. Active Suicidal Ideation with Specific Plan and Intent (Lifetime) - - - - - Yes -   RETIRE: Active Suicidal Ideation with Specific Plan and Intent Description (Lifetime) - - - - - Pt reports plans of overdosing on insulin. -   RETIRED: 5. Active Suicidal Ideation with Specific Plan and Intent (Recent) No - - - No No -   RETIRED: Active Suicidal Ideation with Specific Plan and Intent Description (Recent) - - - - - - -   Most Severe Ideation Rating (Past Month) - - - - - NA -   Most Severe Ideation Description (Past Month) - - - - - - -   Frequency (Past Month) - - - - - NA -   Duration (Past Month) - - - - - NA -   Controllability (Past Month) - - - - - NA -   Protective Factors (Past Month) - - - - - NA -   Reasons for Ideation (Past Month) - - - - - NA -   Actual Attempt (Lifetime) - - - - - Yes -   Actual Attempt Description (Lifetime) - - - - - Atttempted overdoses on insulin. -   Total Number of Actual Attempts (Lifetime) - - - - - 3 -   Comments - - - - - Per chart review total # of attempts appear to be  5-6 -   Actual Attempt (Past 3 Months) - - - - - No -   Actual Attempt Description (Past 3 Months) - - - - - - -   Total Number of Actual Attempts (Past 3 Months) - - - - - - -   Has subject engaged in non-suicidal self-injurious behavior? (Lifetime) - - - - - No -   Has subject engaged in non-suicidal self-injurious behavior? (Past 3 Months) - - - - - No -   Interrupted Attempts (Lifetime) - - - - - Yes -   Interrupted Attempt Description (Lifetime) - - - - - Pt reports her  stopped her from overdoseing on insulin. -   Total Number of Interrupted Attempts (Lifetime) - - - - - 1 -   Interrupted Attempts (Past 3 Months) - - - - - No -   Aborted or Self-Interrupted Attempt (Lifetime) - - - - - No -   Aborted or Self-Interrupted Attempt (Past 3 Months) - - - - - No -   Preparatory Acts or Behavior (Lifetime) - - - - - No -   Preparatory Acts or Behavior (Past 3 Months) - - - - - No -   Most Recent Attempt Date - - - - - 58785 -   Most Recent Attempt Actual Lethality Code - - - - - 2 -   Most Lethal Attempt Actual Lethality Code - - - - - 2 -   Initial/First Attempt Date - - - - - 82717 -   Initial/First Attempt Actual Lethality Code - - - - - 2 -         ASSESSMENT: Current Emotional / Mental Status (status of significant symptoms):   Risk status (Self / Other harm or suicidal ideation)   Patient denies current fears or concerns for personal safety.   Patient denies current or recent suicidal ideation or behaviors.   Patient denies current or recent homicidal ideation or behaviors.   Patient denies current or recent self injurious behavior or ideation.   Patient denies other safety concerns.   Patient reports there has been no change in risk factors since their last session.     Patient reports there has been no change in protective factors since their last session.     Recommended that patient call 911 or go to the local ED should there be a change in any of these risk factors.     Appearance:   phone    Eye  Contact:   phone    Psychomotor Behavior: Retarded (Slowed)    Attitude:   Cooperative    Orientation:   All   Speech    Rate / Production: Slow     Volume:  Normal    Mood:    Anhedonia   Affect:    Blunted    Thought Content:  Poverty of thought   Thought Form:  limited   Insight:    Impaired     Medication Review:   No changes to current psychiatric medication(s)     Medication Compliance:   Yes supported by nurse at group home.      Changes in Health Issues:   None reported     Chemical Use Review:   Substance Use: Chemical use reviewed, no active concerns identified   No current substance use     Tobacco Use: No current tobacco use.      Diagnosis:  1. MDD (major depressive disorder), recurrent episode, moderate (H)      Collateral Reports Completed:   Not Applicable    PLAN: (Patient Tasks / Therapist Tasks / Other)  Try sleep hygiene strategies shared.         Aniya Mcgregor, [facility] 10/27/22                                                         ______________________________________________________________________

## 2022-11-02 ENCOUNTER — OFFICE VISIT (OUTPATIENT)
Dept: PHARMACY | Facility: CLINIC | Age: 62
End: 2022-11-02
Payer: COMMERCIAL

## 2022-11-02 VITALS — SYSTOLIC BLOOD PRESSURE: 120 MMHG | DIASTOLIC BLOOD PRESSURE: 68 MMHG

## 2022-11-02 DIAGNOSIS — M79.7 FIBROMYALGIA: ICD-10-CM

## 2022-11-02 DIAGNOSIS — E11.3299 TYPE 2 DIABETES MELLITUS WITH MILD NONPROLIFERATIVE RETINOPATHY WITHOUT MACULAR EDEMA, WITH LONG-TERM CURRENT USE OF INSULIN, UNSPECIFIED LATERALITY (H): ICD-10-CM

## 2022-11-02 DIAGNOSIS — F25.1 SCHIZOAFFECTIVE DISORDER, DEPRESSIVE TYPE (H): Primary | ICD-10-CM

## 2022-11-02 DIAGNOSIS — Z79.4 TYPE 2 DIABETES MELLITUS WITH MILD NONPROLIFERATIVE RETINOPATHY WITHOUT MACULAR EDEMA, WITH LONG-TERM CURRENT USE OF INSULIN, UNSPECIFIED LATERALITY (H): ICD-10-CM

## 2022-11-02 PROCEDURE — 99607 MTMS BY PHARM ADDL 15 MIN: CPT | Performed by: PHARMACIST

## 2022-11-02 PROCEDURE — 99606 MTMS BY PHARM EST 15 MIN: CPT | Performed by: PHARMACIST

## 2022-11-02 RX ORDER — ISOPROPYL ALCOHOL 70 ML/100ML
1 SWAB TOPICAL
Qty: 400 EACH | Refills: 11 | Status: SHIPPED | OUTPATIENT
Start: 2022-11-02 | End: 2023-11-08

## 2022-11-02 NOTE — PATIENT INSTRUCTIONS
"Recommendations from today's MTM visit:                                                         Increase water intake - 3 big blue glasses of water per day  Try stretching your legs before you go to bed    Follow-up: see Dr. Rainey in December and Eugenia in January    It was great speaking with you today.  I value your experience and would be very thankful for your time in providing feedback in our clinic survey. In the next few days, you may receive an email or text message from [employer] [facility] [employer] with a link to a survey related to your  clinical pharmacist.\"     To schedule another MTM appointment, please call the clinic directly or you may call the MTM scheduling line at [phone] or toll-free at [phone].     My Clinical Pharmacist's contact information:                                                      Please feel free to contact me with any questions or concerns you have.      Eugenia De Jesus, PharmD, [facility]   Medication Management Pharmacist   [facility]  [phone]    "

## 2022-11-02 NOTE — PROGRESS NOTES
Medication Therapy Management (MTM) Encounter    ASSESSMENT:                            Medication Adherence/Access: No issues identified    Schizoaffective disorder: Discussed non pharmacologic management, including avoidance of stressful TV, increasing water intake and stretching before bed to help with muscle cramping. Group home staff will confirm she is scheduled with psychiatry for medication management.     Chronic pain: improving, follow-up at upcoming establish care appointment for further evaluation.     Type 2 Diabetes: A1c at goal <8%. Fasting glucose at goal . Encouraged close monitoring for hypoglycemia with planned diet changes and reviewed when to call clinic.    PLAN:                            Increase water intake and stretching before bed for muscle cramps    Follow-up: 1 month establish care, 2 months MTM    SUBJECTIVE/OBJECTIVE:                          Nena Tang is a 62 year old female coming in for a follow-up visit. Patient was accompanied by group home staff. Today's visit is a follow-up MTM visit from 9/27/22     Reason for visit: medication recheck.    Allergies/ADRs: Reviewed in chart  Past Medical History: Reviewed in chart  Tobacco: She reports that she has never smoked. She has never used smokeless tobacco.  Alcohol: none    Medication Adherence/Access: no issues reported    Schizoaffective disorder: currently taking Invega 9mg at bedtime, quetiapine 25mg at bedtime, escitalopram 10mg daily, clonazepam 0.5mg at bedtime, trazodone 100mg at bedtime.   Mood has been down a bit. Has been connecting with family, watching scary movies together. Stayed over her son's last night.   Frequently having muscle cramps in arms and legs, wakes up her and hard to go back to sleep. Reports drinking very little water, typically only drinks coffee and soda but will have 1 water bottle at drop in center 3 times a week.  Started having nightmares this past week, has been disrupting her sleep.  Only occurs at home, not with her son.   Drop in center 3 times a week, participating in group exercise.   Therapist - started virtual therapy.  Psychiatry - none. Staff thinks she has an appointment scheduled soon to discuss nightmares but unsure if this is a psychiatrist or psychotherapist.    Chronic pain,fibromyalgia: currently taking acetaminophen 1000mg 2-3 times a day. Continues to be bothered by back pain and shoulder pain. Also taking topiramate 200mg daily, gabapentin 300mg daily. She is unsure either of these medications are helpful. Staff reports she has been moving around a little better this past month, however today slow standing up and walking after spending a night on an uncomfortable bed.  Participating in group exercise as drop in center. She would like to work on weight loss.      Type 2 Diabetes:  Currently taking Lantus 33 units twice daily, Humalog 8 units with meals +sliding scale 3u per 50mg/dL starting glucose 151 and max 23u. Trulicity 3 mg weekly. Patient is not experiencing side effects.   Staff is short on pen needles and alcohol swabs, wonders if more can be made available per month.   Blood sugar monitoring: Continuous Glucose Monitor. Ranges (patient reported)  80-low 100s, happy with control.   Symptoms of low blood sugar? none  Symptoms of high blood sugar? none  Eye exam: up to date  Foot exam: up to date  Diet: would like to work on reducing junk food and candy, weight loss.  Aspirin: Taking 81mg daily.   Statin: Yes: Atorvastatin.   ACEi/ARB: Yes: Losartan.  Urine Albumin:         Lab Results   Component Value Date     UMALCR 9.40 07/27/2021      Lab Results   Component Value Date    A1C 7.4 08/17/2022    A1C 8.0 06/21/2022    A1C 7.3 03/24/2022    A1C 8.1 09/25/2021    A1C 10.0 08/10/2021    A1C 9.1 12/01/2020    A1C 9.7 09/15/2020    A1C 8.6 06/30/2020    A1C 6.2 12/03/2019    A1C 6.6 08/06/2019         Today's Vitals: /68   LMP 01/06/2015 (Exact Date)    ----------------      I spent 40 minutes with this patient today. All changes were made via verbal approval with Albino Rainey MD. A copy of the visit note was provided to the patient's provider(s).    The patient was given a summary of these recommendations.     Eugenia De Jesus, PharmD, [facility]      Medication Therapy Recommendations  No medication therapy recommendations to display

## 2022-11-04 ENCOUNTER — MYC MEDICAL ADVICE (OUTPATIENT)
Dept: PHARMACY | Facility: CLINIC | Age: 62
End: 2022-11-04

## 2022-11-04 DIAGNOSIS — Z79.4 TYPE 2 DIABETES MELLITUS WITH MILD NONPROLIFERATIVE RETINOPATHY WITHOUT MACULAR EDEMA, WITH LONG-TERM CURRENT USE OF INSULIN, UNSPECIFIED LATERALITY (H): Primary | ICD-10-CM

## 2022-11-04 DIAGNOSIS — E11.3299 TYPE 2 DIABETES MELLITUS WITH MILD NONPROLIFERATIVE RETINOPATHY WITHOUT MACULAR EDEMA, WITH LONG-TERM CURRENT USE OF INSULIN, UNSPECIFIED LATERALITY (H): Primary | ICD-10-CM

## 2022-11-04 RX ORDER — LANCETS 33 GAUGE
1 EACH MISCELLANEOUS PRN
Qty: 100 EACH | Refills: 3 | Status: SHIPPED | OUTPATIENT
Start: 2022-11-04 | End: 2024-01-11

## 2022-11-09 NOTE — PROGRESS NOTES
[facility]    Outpatient Physical Therapy Discharge Note  Patient: Nena Tang  : 1960    Patient was seen for 16 visits for chronic pain from 22 to 22 with no follow up appointments.      Plan:  Discharge from therapy.     Reason for Discharge: Patient chooses to discontinue therapy.  Patient has failed to schedule further appointments.    Discharge Plan: Patient to continue home program.     If patient would like to return to therapy, they will need a new PT order from referring provider.    Tia Gonzalez, PT   2022

## 2022-11-14 DIAGNOSIS — K21.9 GASTROESOPHAGEAL REFLUX DISEASE WITHOUT ESOPHAGITIS: ICD-10-CM

## 2022-11-14 RX ORDER — PANTOPRAZOLE SODIUM 40 MG/1
40 TABLET, DELAYED RELEASE ORAL DAILY
Qty: 28 TABLET | Refills: 0 | Status: SHIPPED | OUTPATIENT
Start: 2022-11-14 | End: 2022-12-12

## 2022-11-14 NOTE — TELEPHONE ENCOUNTER
"Requested Prescriptions   Pending Prescriptions Disp Refills     pantoprazole (PROTONIX) 40 MG EC tablet [Pharmacy Med Name: Pantoprazole Sodium 40MG TBEC] 28 tablet 0     Sig: TAKE 1 TABLET (40 MG) BY MOUTH DAILY       PPI Protocol Passed - 11/14/2022  3:22 PM        Passed - Not on Clopidogrel (unless Pantoprazole ordered)        Passed - No diagnosis of osteoporosis on record        Passed - Recent (12 mo) or future (30 days) visit within the authorizing provider's specialty     Patient has had an office visit with the authorizing provider or a provider within the authorizing providers department within the previous 12 mos or has a future within next 30 days. See \"Patient Info\" tab in inbasket, or \"Choose Columns\" in Meds & Orders section of the refill encounter.              Passed - Medication is active on med list        Passed - Patient is age 18 or older        Passed - No active pregnacy on record        Passed - No positive pregnancy test in past 12 months           Has appointment 12/12/22 with Dr. Rainey.     Prescription approved per [facility] Refill Protocol.    Anuja Valdivia RN on 11/14/2022 at 3:26 PM    "

## 2022-11-15 DIAGNOSIS — I10 HTN, GOAL BELOW 140/90: ICD-10-CM

## 2022-11-15 RX ORDER — METOPROLOL SUCCINATE 50 MG/1
TABLET, EXTENDED RELEASE ORAL
Qty: 120 TABLET | Refills: 0 | Status: SHIPPED | OUTPATIENT
Start: 2022-11-15 | End: 2022-12-12

## 2022-11-15 NOTE — TELEPHONE ENCOUNTER
"Requested Prescriptions   Pending Prescriptions Disp Refills     metoprolol succinate ER (TOPROL XL) 50 MG 24 hr tablet 120 tablet 3     Sig: Take 50mg in the morning and 100mg at night       Beta-Blockers Protocol Passed - 11/15/2022 12:41 PM        Passed - Blood pressure under 140/90 in past 12 months     BP Readings from Last 3 Encounters:   11/02/22 120/68   09/28/22 114/59   09/27/22 132/78                 Passed - Patient is age 6 or older        Passed - Recent (12 mo) or future (30 days) visit within the authorizing provider's specialty     Patient has had an office visit with the authorizing provider or a provider within the authorizing providers department within the previous 12 mos or has a future within next 30 days. See \"Patient Info\" tab in inbasket, or \"Choose Columns\" in Meds & Orders section of the refill encounter.              Passed - Medication is active on med list           Prescription approved per [facility] Refill Protocol.  Anuja Valdivia RN on 11/15/2022 at 1:06 PM    "

## 2022-11-17 ENCOUNTER — TELEPHONE (OUTPATIENT)
Dept: FAMILY MEDICINE | Facility: CLINIC | Age: 62
End: 2022-11-17

## 2022-11-17 DIAGNOSIS — F25.1 SCHIZOAFFECTIVE DISORDER, DEPRESSIVE TYPE (H): ICD-10-CM

## 2022-11-17 DIAGNOSIS — E11.3299 TYPE 2 DIABETES MELLITUS WITH MILD NONPROLIFERATIVE RETINOPATHY WITHOUT MACULAR EDEMA, WITH LONG-TERM CURRENT USE OF INSULIN, UNSPECIFIED LATERALITY (H): ICD-10-CM

## 2022-11-17 DIAGNOSIS — Z79.4 TYPE 2 DIABETES MELLITUS WITH MILD NONPROLIFERATIVE RETINOPATHY WITHOUT MACULAR EDEMA, WITH LONG-TERM CURRENT USE OF INSULIN, UNSPECIFIED LATERALITY (H): ICD-10-CM

## 2022-11-17 NOTE — TELEPHONE ENCOUNTER
Prior Authorization Approval    Authorization Effective Date: 10/18/2022  Authorization Expiration Date: 11/17/2023  Medication: hydrOXYzine (VISTARIL) 25 MG capsule-APPROVED  Approved Dose/Quantity:   Reference #:     Insurance Company: [employer] - Phone [phone] Fax [phone]  Expected CoPay:       CoPay Card Available:      Foundation Assistance Needed:    Which Pharmacy is filling the prescription (Not needed for infusion/clinic administered): [facility] [place] [address] [address]  Pharmacy Notified: Yes  Patient Notified: No    **Instructed pharmacy to notify patient when script is ready to /ship.**

## 2022-11-17 NOTE — TELEPHONE ENCOUNTER
"Requested Prescriptions   Pending Prescriptions Disp Refills     amantadine (SYMMETREL) 100 MG capsule [Pharmacy Med Name: Amantadine HCl 100MG CAPS] 84 capsule 3     Sig: TAKE 1 CAPSULE BY MOUTH EVERY MORNING AND TAKE 2 CAPSULES BY MOUTH EVERY EVENING       There is no refill protocol information for this order        insulin lispro (HUMALOG KWIKPEN) 100 UNIT/ML (1 unit dial) KWIKPEN [Pharmacy Med Name: HumaLOG KwikPen 100UNIT/ML SOPN*] 15 mL 1     Sig: TAKE 8 UNITS THREE TIMES A DAY WITH MEALS PLUS SLIDING SCALE . ADD 3 UNITS FOR EVERY 50MG/DL OVER 150 WITH MAX       Short Acting Insulin Protocol Passed - 11/17/2022  3:33 PM        Passed - Serum creatinine on file in past 12 months     Recent Labs   Lab Test 09/27/22  2216 05/16/19  0658 05/15/19  1834   CR 1.14*   < >  --    CREAT  --   --  0.9    < > = values in this interval not displayed.       Ok to refill medication if creatinine is low          Passed - HgbA1C in past 3 or 6 months     If HgbA1C is 8 or greater, it needs to be on file within the past 3 months.  If less than 8, must be on file within the past 6 months.     Recent Labs   Lab Test 08/17/22  1056 03/24/22  1211 02/25/22  1318   A1C 7.4*   < >  --    711[address]  --   --  7.7*    < > = values in this interval not displayed.             Passed - Medication is active on med list        Passed - Patient is age 18 or older        Passed - Recent (6 mo) or future (30 days) visit within the authorizing provider's specialty     Patient had office visit in the last 6 months or has a visit in the next 30 days with authorizing provider or within the authorizing provider's specialty.  See \"Patient Info\" tab in inbasket, or \"Choose Columns\" in Meds & Orders section of the refill encounter.               Routing refill request to provider for review/approval because:  Drug not on the [facility] refill protocol     I am not able to sign insulin order d/t formulary issue.     Anuja Valdivia RN on 11/17/2022 at 3:38 " PM

## 2022-11-17 NOTE — TELEPHONE ENCOUNTER
Central Prior Authorization Team   Phone: [phone]      PA Initiation    Medication: hydrOXYzine (VISTARIL) 25 MG capsule  Insurance Company: [employer] - Phone [phone] Fax [phone]  Pharmacy Filling the Rx: [id number] [place] [address]  Filling Pharmacy Phone: [phone]  Filling Pharmacy Fax:    Start Date: 11/17/2022

## 2022-11-18 DIAGNOSIS — E11.3299 TYPE 2 DIABETES MELLITUS WITH MILD NONPROLIFERATIVE RETINOPATHY WITHOUT MACULAR EDEMA, WITH LONG-TERM CURRENT USE OF INSULIN, UNSPECIFIED LATERALITY (H): ICD-10-CM

## 2022-11-18 DIAGNOSIS — Z79.4 TYPE 2 DIABETES MELLITUS WITH MILD NONPROLIFERATIVE RETINOPATHY WITHOUT MACULAR EDEMA, WITH LONG-TERM CURRENT USE OF INSULIN, UNSPECIFIED LATERALITY (H): ICD-10-CM

## 2022-11-18 DIAGNOSIS — I25.119 CORONARY ARTERY DISEASE INVOLVING NATIVE HEART WITH ANGINA PECTORIS, UNSPECIFIED VESSEL OR LESION TYPE (H): ICD-10-CM

## 2022-11-18 RX ORDER — AMANTADINE HYDROCHLORIDE 100 MG/1
CAPSULE, GELATIN COATED ORAL
Qty: 84 CAPSULE | Refills: 3 | Status: SHIPPED | OUTPATIENT
Start: 2022-11-18 | End: 2023-02-22

## 2022-11-18 RX ORDER — LOSARTAN POTASSIUM 100 MG/1
TABLET ORAL
Qty: 28 TABLET | Refills: 11 | Status: SHIPPED | OUTPATIENT
Start: 2022-11-18 | End: 2023-10-10

## 2022-11-18 RX ORDER — INSULIN LISPRO 100 [IU]/ML
INJECTION, SOLUTION INTRAVENOUS; SUBCUTANEOUS
Qty: 15 ML | Refills: 1 | Status: SHIPPED | OUTPATIENT
Start: 2022-11-18 | End: 2023-01-09

## 2022-11-18 RX ORDER — INSULIN LISPRO 100 [IU]/ML
INJECTION, SOLUTION INTRAVENOUS; SUBCUTANEOUS
Qty: 15 ML | Refills: 1 | OUTPATIENT
Start: 2022-11-18

## 2022-11-18 NOTE — TELEPHONE ENCOUNTER
"Requested Prescriptions   Pending Prescriptions Disp Refills     losartan (COZAAR) 100 MG tablet [Pharmacy Med Name: Losartan Potassium 100MG TABS] 28 tablet      Sig: TAKE 1 TABLET BY MOUTH DAILY       Angiotensin-II Receptors Failed - 11/18/2022  1:56 PM        Failed - Normal serum creatinine on file in past 12 months     Recent Labs   Lab Test 09/27/22  2216 05/16/19  0658 05/15/19  1834   CR 1.14*   < >  --    CREAT  --   --  0.9    < > = values in this interval not displayed.       Ok to refill medication if creatinine is low          Passed - Last blood pressure under 140/90 in past 12 months     BP Readings from Last 3 Encounters:   11/02/22 120/68   09/28/22 114/59   09/27/22 132/78                 Passed - Recent (12 mo) or future (30 days) visit within the authorizing provider's specialty     Patient has had an office visit with the authorizing provider or a provider within the authorizing providers department within the previous 12 mos or has a future within next 30 days. See \"Patient Info\" tab in inbasket, or \"Choose Columns\" in Meds & Orders section of the refill encounter.              Passed - Medication is active on med list        Passed - Patient is age 18 or older        Passed - No active pregnancy on record        Passed - Normal serum potassium on file in past 12 months     Recent Labs   Lab Test 09/27/22 2216   POTASSIUM 4.7                    Passed - No positive pregnancy test in past 12 months             Routing refill request to provider for review/approval because medication did not pass protocol.  Pt has an appointment on 12/12/22    Monique Peters RN  [facility]    "

## 2022-11-23 ENCOUNTER — VIRTUAL VISIT (OUTPATIENT)
Dept: PSYCHOLOGY | Facility: CLINIC | Age: 62
End: 2022-11-23
Payer: COMMERCIAL

## 2022-11-23 DIAGNOSIS — F33.1 MDD (MAJOR DEPRESSIVE DISORDER), RECURRENT EPISODE, MODERATE (H): Primary | ICD-10-CM

## 2022-11-23 PROCEDURE — 90834 PSYTX W PT 45 MINUTES: CPT | Mod: 95

## 2022-11-23 NOTE — PROGRESS NOTES
"    [facility]                                     Progress Note    Patient Name: Nena Tang  Date: 11/23/22         Service Type: Individual      Session Start Time: 10:10 am  Session End Time: 10:50 am     Session Length: 40 min    Session #: 3    Attendees: Client attended alone    Service Modality:  Phone Visit:      Provider verified identity through the following two step process.  Patient provided:  Patient is known previously to provider    The patient has been notified of the following:      \"We have found that certain health care needs can be provided without the need for a face to face visit.  This service lets us provide the care you need with a phone conversation.       I will have full access to your [facility] medical record during this entire phone call.   I will be taking notes for your medical record.      Since this is like an office visit, we will bill your insurance company for this service.       There are potential benefits and risks of telephone visits (e.g. limits to patient confidentiality) that differ from in-person visits.?Confidentiality still applies for telephone services, and nobody will record the visit.  It is important to be in a quiet, private space that is free of distractions (including cell phone or other devices) during the visit.??      If during the course of the call I believe a telephone visit is not appropriate, you will not be charged for this service\"     Consent has been obtained for this service by care team member: Yes     DATA  Interactive Complexity: No  Crisis: No     Progress Since Last Session (Related to Symptoms / Goals / Homework):   Symptoms: No change inconsistent sleep-bad dreams, low mood    Homework: not assigned      Episode of Care Goals: No improvement - PRECONTEMPLATION (Not seeing need for change); Intervened by educating the patient about the effects of current behavior on health.  Evoked information about reasons to " continue behavior, express concern / recommendations, and explored any change talk     Current / Ongoing Stressors and Concerns:  Experiencing violent suicidal/homicidal nightmares, attending day program M Tu Thurs at [facility]. Attending drop in program as well.      Treatment Objective(s) Addressed in This Session:   identify and practice sleep hygiene practices  Nightmare protocol      Intervention:   CBT: Reviewed psychoeducation on sleep hygiene. Provided grounding practices and worked to rescript the nightmare to infuse more positive memories.      Assessments completed prior to visit: NESHA  10/17/22  The following assessments were completed by patient for this visit:  PHQ2:   PHQ-2 ( 1999 [employer]) 8/25/2022 7/20/2022 6/21/2022 5/9/2022 4/8/2022 8/24/2021 11/1/2019   Q1: Little interest or pleasure in doing things 0 1 - 1 0 0 1   Q2: Feeling down, depressed or hopeless 0 1 - 1 0 1 1   PHQ-2 Score 0 2 - 2 0 1 2   PHQ-2 Total Score (12-17 Years)- Positive if 3 or more points; Administer PHQ-A if positive - - - - - 1 2   Q1: Little interest or pleasure in doing things - - - Several days - - -   Q2: Feeling down, depressed or hopeless - - - Several days - - -   PHQ-2 Score - - Incomplete 2 - - -     PHQ9:   PHQ-9 SCORE 10/26/2018 5/7/2019 12/1/2020 11/15/2021 6/21/2022 8/17/2022 10/16/2022   PHQ-9 Total Score - - - - - - -   PHQ-9 Total Score Fabiohart - - - - 15 (Moderately severe depression) 10 (Moderate depression) 14 (Moderate depression)   PHQ-9 Total Score - - - - - - -   PHQ-9 Total Score 20 22 13 0 15 10 14     GAD2:   TAMIA-2 10/16/2022   Feeling nervous, anxious, or on edge 1   Not being able to stop or control worrying 1   TAMIA-2 Total Score 2     GAD7:   TAMIA-7 SCORE 12/20/2016 1/2/2017 2/3/2017 3/13/2018 10/26/2018 4/29/2022 6/21/2022   Total Score - - - - - - -   Total Score - - - - - - 17 (severe anxiety)   Total Score 6 0 0 17 19 11 17   Total Score - - - - - - -     CAGE-AID:   CAGE-AID Total Score  "4/29/2022 10/16/2022   Total Score 0 0   Total Score MyChart - 0 (A total score of 2 or greater is considered clinically significant)     PROMIS 10-Global Health (only subscores and total score):   PROMIS-10 Scores Only 7/22/2019 11/4/2019 10/16/2022   Global Mental Health Score 7 11 8   Global Physical Health Score 9 11 7   PROMIS TOTAL - SUBSCORES 16 22 15     [place] Suicide Severity Rating Scale (Lifetime/Recent)  [place] Suicide Severity Rating (Lifetime/Recent) 12/31/2019 12/31/2019 1/1/2020 1/1/2020 1/2/2020 7/29/2021 10/17/2022   Wish to be Dead (Lifetime) - - - - - Yes -   Comments - - - - - Pt reports long hx of SI. She notes thoughts of \"I'm better off dead.\" She reports severe epsisodes following the deaths of her , mother, and sister, \"I feel I could have saved them some how.\" -   Non-Specific Active Suicidal Thoughts (Lifetime) - - - - - Yes -   Non-Specific Active Suicidal Thought Description (Lifetime) - - - - - - -   Most Severe Ideation Rating (Lifetime) - - - - - 5 -   Most Severe Ideation Description (Lifetime) - - - - - Pt reports most severe SI following the deaths of her mother and  9-10 years ago. -   Frequency (Lifetime) - - - - - 5 -   Duration (Lifetime) NA - - - - 3 -   Controllability (Lifetime) - - - - - 5 -   Protective Factors  (Lifetime) - - - - - 3 -   Reasons for Ideation (Lifetime) - - - - - 5 -   Q1 Wished to be Dead (Past Month) - - - - - - no   Q2 Suicidal Thoughts (Past Month) - - - - - - no   Q3 Suicidal Thought Method - - - - - - no   Q4 Suicidal Intent without Specific Plan - - - - - - no   Q5 Suicide Intent with Specific Plan - - - - - - no   Q6 Suicide Behavior (Lifetime) - - - - - - yes   Within the Past 3 Months? - - - - - - no   Level of Risk per Screen - - - - - - moderate risk   Do you have guns available to you? - - - - - - -   RETIRED: 1. Wish to be Dead (Recent) No No No No No No -   Comments - - - - - - -   RETIRED: Wish to be Dead Description " (Recent) - - - - - - -   RETIRED: 2. Non-Specific Active Suicidal Thoughts (Recent) No No No No No No -   Comments - - - - - - -   Non-Specific Active Suicidal Thought Description (Recent) - - - - - - -   3. Active Suicidal Ideation with any Methods (Not Plan) Without Intent to Act (Lifetime) - - - - - Yes -   RETIRE: Active Suicidal Ideation with any Methods (Not Plan) Description (Lifetime) - - - - - Pt reports thoughts of overdosing on insulin and cutting her wrists. -   RETIRED: 3. Active Suicidal Ideation with any Methods (Not Plan) Without Intent to Act (Recent) No No - - No No -   RETIRED: Active Suicidal Ideation with any Methods (Not Plan) Description (Recent) - - - - - - -   RETIRE: 4. Active Suicidal Ideation with Some Intent to Act, Without Specific Plan (Lifetime) - - - - - Yes -   4. Active Suicidal Ideation with Some Intent to Act, Without Specific Plan (Recent) No - - - No No -   Active Suicidal Ideation with Some Intent to Act, Without Specific Plan Description (Recent) - - - - - - -   RETIRE: 5. Active Suicidal Ideation with Specific Plan and Intent (Lifetime) - - - - - Yes -   RETIRE: Active Suicidal Ideation with Specific Plan and Intent Description (Lifetime) - - - - - Pt reports plans of overdosing on insulin. -   RETIRED: 5. Active Suicidal Ideation with Specific Plan and Intent (Recent) No - - - No No -   RETIRED: Active Suicidal Ideation with Specific Plan and Intent Description (Recent) - - - - - - -   Most Severe Ideation Rating (Past Month) - - - - - NA -   Most Severe Ideation Description (Past Month) - - - - - - -   Frequency (Past Month) - - - - - NA -   Duration (Past Month) - - - - - NA -   Controllability (Past Month) - - - - - NA -   Protective Factors (Past Month) - - - - - NA -   Reasons for Ideation (Past Month) - - - - - NA -   Actual Attempt (Lifetime) - - - - - Yes -   Actual Attempt Description (Lifetime) - - - - - Atttempted overdoses on insulin. -   Total Number of Actual  Attempts (Lifetime) - - - - - 3 -   Comments - - - - - Per chart review total # of attempts appear to be 5-6 -   Actual Attempt (Past 3 Months) - - - - - No -   Actual Attempt Description (Past 3 Months) - - - - - - -   Total Number of Actual Attempts (Past 3 Months) - - - - - - -   Has subject engaged in non-suicidal self-injurious behavior? (Lifetime) - - - - - No -   Has subject engaged in non-suicidal self-injurious behavior? (Past 3 Months) - - - - - No -   Interrupted Attempts (Lifetime) - - - - - Yes -   Interrupted Attempt Description (Lifetime) - - - - - Pt reports her  stopped her from overdoseing on insulin. -   Total Number of Interrupted Attempts (Lifetime) - - - - - 1 -   Interrupted Attempts (Past 3 Months) - - - - - No -   Aborted or Self-Interrupted Attempt (Lifetime) - - - - - No -   Aborted or Self-Interrupted Attempt (Past 3 Months) - - - - - No -   Preparatory Acts or Behavior (Lifetime) - - - - - No -   Preparatory Acts or Behavior (Past 3 Months) - - - - - No -   Most Recent Attempt Date - - - - - 54908 -   Most Recent Attempt Actual Lethality Code - - - - - 2 -   Most Lethal Attempt Actual Lethality Code - - - - - 2 -   Initial/First Attempt Date - - - - - 01166 -   Initial/First Attempt Actual Lethality Code - - - - - 2 -         ASSESSMENT: Current Emotional / Mental Status (status of significant symptoms):   Risk status (Self / Other harm or suicidal ideation)   Patient denies current fears or concerns for personal safety.   Patient reports the following current or recent suicidal ideation or behaviors: nightmares about suicide.   Patient reports current or recent homicidal ideation or behaviors including homicidal ideation in nightmares   Patient denies current or recent self injurious behavior or ideation.   Patient denies other safety concerns.   Patient reports there has been no change in risk factors since their last session.     Patient reports there has been no change in  protective factors since their last session.     Recommended that patient call 911 or go to the local ED should there be a change in any of these risk factors.     Appearance:   phone    Eye Contact:   phone    Psychomotor Behavior: Retarded (Slowed)    Attitude:   Cooperative    Orientation:   All   Speech    Rate / Production: Slow     Volume:  Normal    Mood:    Anhedonia   Affect:    Blunted    Thought Content:  Poverty of thought   Thought Form:  limited   Insight:    Impaired     Medication Review:   No changes to current psychiatric medication(s)     Medication Compliance:   Yes supported by nurse at group home.      Changes in Health Issues:   None reported     Chemical Use Review:   Substance Use: Chemical use reviewed, no active concerns identified   No current substance use     Tobacco Use: No current tobacco use.      Diagnosis:  1. MDD (major depressive disorder), recurrent episode, moderate (H)      Collateral Reports Completed:   Not Applicable    PLAN: (Patient Tasks / Therapist Tasks / Other)  Try sleep hygiene strategies shared. Use nightmare rescripting and grounding practices to support improved sleep. Attend doctors appointments.         Aniya Mcgregor, [facility] Center[facility]ESSICA 11/23/22                                                         ______________________________________________________________________

## 2022-12-07 ENCOUNTER — DOCUMENTATION ONLY (OUTPATIENT)
Dept: PSYCHOLOGY | Facility: CLINIC | Age: 62
End: 2022-12-07
Payer: COMMERCIAL

## 2022-12-07 DIAGNOSIS — F25.0 SCHIZOAFFECTIVE DISORDER, BIPOLAR TYPE (H): ICD-10-CM

## 2022-12-07 RX ORDER — CLONAZEPAM 0.5 MG/1
TABLET ORAL
Qty: 28 TABLET | Refills: 5 | Status: SHIPPED | OUTPATIENT
Start: 2022-12-07 | End: 2023-05-23

## 2022-12-07 NOTE — PROGRESS NOTES
Writer made 2 attempts to reach patient by phone for telephone visit. Left 2 messages. No return call. No show.    Aniya PETERS LICSW  12/7/22

## 2022-12-12 ENCOUNTER — OFFICE VISIT (OUTPATIENT)
Dept: FAMILY MEDICINE | Facility: CLINIC | Age: 62
End: 2022-12-12
Payer: COMMERCIAL

## 2022-12-12 ENCOUNTER — LAB (OUTPATIENT)
Dept: LAB | Facility: CLINIC | Age: 62
End: 2022-12-12
Payer: COMMERCIAL

## 2022-12-12 VITALS
BODY MASS INDEX: 47.77 KG/M2 | TEMPERATURE: 97.3 F | OXYGEN SATURATION: 98 % | RESPIRATION RATE: 18 BRPM | DIASTOLIC BLOOD PRESSURE: 72 MMHG | HEART RATE: 73 BPM | WEIGHT: 253 LBS | HEIGHT: 61 IN | SYSTOLIC BLOOD PRESSURE: 134 MMHG

## 2022-12-12 DIAGNOSIS — K21.9 GASTROESOPHAGEAL REFLUX DISEASE WITHOUT ESOPHAGITIS: ICD-10-CM

## 2022-12-12 DIAGNOSIS — I25.10 CORONARY ARTERY DISEASE INVOLVING NATIVE HEART WITHOUT ANGINA PECTORIS, UNSPECIFIED VESSEL OR LESION TYPE: ICD-10-CM

## 2022-12-12 DIAGNOSIS — E11.9 TYPE 2 DIABETES MELLITUS WITHOUT COMPLICATION, WITH LONG-TERM CURRENT USE OF INSULIN (H): ICD-10-CM

## 2022-12-12 DIAGNOSIS — Z12.31 ENCOUNTER FOR SCREENING MAMMOGRAM FOR BREAST CANCER: ICD-10-CM

## 2022-12-12 DIAGNOSIS — Z79.4 TYPE 2 DIABETES MELLITUS WITHOUT COMPLICATION, WITH LONG-TERM CURRENT USE OF INSULIN (H): ICD-10-CM

## 2022-12-12 DIAGNOSIS — F25.0 SCHIZOAFFECTIVE DISORDER, BIPOLAR TYPE (H): ICD-10-CM

## 2022-12-12 DIAGNOSIS — Z12.31 VISIT FOR SCREENING MAMMOGRAM: ICD-10-CM

## 2022-12-12 DIAGNOSIS — Z00.00 ENCOUNTER FOR MEDICARE ANNUAL WELLNESS EXAM: Primary | ICD-10-CM

## 2022-12-12 DIAGNOSIS — I10 HTN, GOAL BELOW 140/90: ICD-10-CM

## 2022-12-12 DIAGNOSIS — N10 ACUTE PYELONEPHRITIS: ICD-10-CM

## 2022-12-12 LAB
ALBUMIN SERPL BCG-MCNC: 4.2 G/DL (ref 3.5–5.2)
ALP SERPL-CCNC: 73 U/L (ref 35–104)
ALT SERPL W P-5'-P-CCNC: 23 U/L (ref 10–35)
ANION GAP SERPL CALCULATED.3IONS-SCNC: 11 MMOL/L (ref 7–15)
AST SERPL W P-5'-P-CCNC: 20 U/L (ref 10–35)
BASOPHILS # BLD AUTO: 0 10E3/UL (ref 0–0.2)
BASOPHILS NFR BLD AUTO: 1 %
BILIRUB SERPL-MCNC: 0.2 MG/DL
BUN SERPL-MCNC: 16.6 MG/DL (ref 8–23)
CALCIUM SERPL-MCNC: 9.5 MG/DL (ref 8.8–10.2)
CHLORIDE SERPL-SCNC: 110 MMOL/L (ref 98–107)
CREAT SERPL-MCNC: 1.11 MG/DL (ref 0.51–0.95)
DEPRECATED HCO3 PLAS-SCNC: 21 MMOL/L (ref 22–29)
EOSINOPHIL # BLD AUTO: 0.2 10E3/UL (ref 0–0.7)
EOSINOPHIL NFR BLD AUTO: 3 %
ERYTHROCYTE [DISTWIDTH] IN BLOOD BY AUTOMATED COUNT: 13.1 % (ref 10–15)
GFR SERPL CREATININE-BSD FRML MDRD: 56 ML/MIN/1.73M2
GLUCOSE SERPL-MCNC: 125 MG/DL (ref 70–99)
HBA1C MFR BLD: 6.4 % (ref 0–5.6)
HCT VFR BLD AUTO: 33.6 % (ref 35–47)
HGB BLD-MCNC: 10.8 G/DL (ref 11.7–15.7)
LYMPHOCYTES # BLD AUTO: 1.7 10E3/UL (ref 0.8–5.3)
LYMPHOCYTES NFR BLD AUTO: 36 %
MCH RBC QN AUTO: 32.3 PG (ref 26.5–33)
MCHC RBC AUTO-ENTMCNC: 32.1 G/DL (ref 31.5–36.5)
MCV RBC AUTO: 101 FL (ref 78–100)
MONOCYTES # BLD AUTO: 0.5 10E3/UL (ref 0–1.3)
MONOCYTES NFR BLD AUTO: 11 %
NEUTROPHILS # BLD AUTO: 2.2 10E3/UL (ref 1.6–8.3)
NEUTROPHILS NFR BLD AUTO: 49 %
PLATELET # BLD AUTO: 196 10E3/UL (ref 150–450)
POTASSIUM SERPL-SCNC: 4.4 MMOL/L (ref 3.4–5.3)
PROT SERPL-MCNC: 7.2 G/DL (ref 6.4–8.3)
RBC # BLD AUTO: 3.34 10E6/UL (ref 3.8–5.2)
SODIUM SERPL-SCNC: 142 MMOL/L (ref 136–145)
WBC # BLD AUTO: 4.5 10E3/UL (ref 4–11)

## 2022-12-12 PROCEDURE — 83036 HEMOGLOBIN GLYCOSYLATED A1C: CPT

## 2022-12-12 PROCEDURE — 80053 COMPREHEN METABOLIC PANEL: CPT

## 2022-12-12 PROCEDURE — 99396 PREV VISIT EST AGE 40-64: CPT | Performed by: FAMILY MEDICINE

## 2022-12-12 PROCEDURE — 85025 COMPLETE CBC W/AUTO DIFF WBC: CPT

## 2022-12-12 PROCEDURE — 36415 COLL VENOUS BLD VENIPUNCTURE: CPT

## 2022-12-12 RX ORDER — CEPHALEXIN 500 MG/1
500 CAPSULE ORAL 3 TIMES DAILY
COMMUNITY
Start: 2022-12-10 | End: 2022-12-17

## 2022-12-12 RX ORDER — PANTOPRAZOLE SODIUM 40 MG/1
40 TABLET, DELAYED RELEASE ORAL DAILY
Qty: 28 TABLET | Refills: 0 | Status: SHIPPED | OUTPATIENT
Start: 2022-12-12 | End: 2023-01-09

## 2022-12-12 RX ORDER — METOPROLOL SUCCINATE 50 MG/1
TABLET, EXTENDED RELEASE ORAL
Qty: 84 TABLET | Refills: 1 | Status: SHIPPED | OUTPATIENT
Start: 2022-12-12 | End: 2023-01-30

## 2022-12-12 ASSESSMENT — ENCOUNTER SYMPTOMS
CHILLS: 0
PALPITATIONS: 0
DIARRHEA: 0
NERVOUS/ANXIOUS: 1
DIZZINESS: 1
CONSTIPATION: 1
HEMATURIA: 0
MYALGIAS: 1
HEADACHES: 1
BREAST MASS: 0
FEVER: 0
SHORTNESS OF BREATH: 1
JOINT SWELLING: 0
HEARTBURN: 0
SORE THROAT: 0
NAUSEA: 1
HEMATOCHEZIA: 0
ARTHRALGIAS: 1
DYSURIA: 1
ABDOMINAL PAIN: 1
PARESTHESIAS: 0
EYE PAIN: 0
FREQUENCY: 1
WEAKNESS: 1
COUGH: 1

## 2022-12-12 ASSESSMENT — ACTIVITIES OF DAILY LIVING (ADL)
CURRENT_FUNCTION: PREPARING MEALS REQUIRES ASSISTANCE
CURRENT_FUNCTION: HOUSEWORK REQUIRES ASSISTANCE
CURRENT_FUNCTION: TRANSPORTATION REQUIRES ASSISTANCE
CURRENT_FUNCTION: LAUNDRY REQUIRES ASSISTANCE
CURRENT_FUNCTION: BATHING REQUIRES ASSISTANCE
CURRENT_FUNCTION: SHOPPING REQUIRES ASSISTANCE
CURRENT_FUNCTION: MEDICATION ADMINISTRATION REQUIRES ASSISTANCE

## 2022-12-12 ASSESSMENT — PATIENT HEALTH QUESTIONNAIRE - PHQ9
10. IF YOU CHECKED OFF ANY PROBLEMS, HOW DIFFICULT HAVE THESE PROBLEMS MADE IT FOR YOU TO DO YOUR WORK, TAKE CARE OF THINGS AT HOME, OR GET ALONG WITH OTHER PEOPLE: VERY DIFFICULT
SUM OF ALL RESPONSES TO PHQ QUESTIONS 1-9: 19
SUM OF ALL RESPONSES TO PHQ QUESTIONS 1-9: 19

## 2022-12-12 ASSESSMENT — PAIN SCALES - GENERAL: PAINLEVEL: SEVERE PAIN (7)

## 2022-12-12 NOTE — TELEPHONE ENCOUNTER
"Requested Prescriptions   Pending Prescriptions Disp Refills     pantoprazole (PROTONIX) 40 MG EC tablet [Pharmacy Med Name: Pantoprazole Sodium 40MG TBEC] 28 tablet 0     Sig: TAKE 1 TABLET (40 MG) BY MOUTH DAILY       PPI Protocol Passed - 12/12/2022  4:47 PM        Passed - Not on Clopidogrel (unless Pantoprazole ordered)        Passed - No diagnosis of osteoporosis on record        Passed - Recent (12 mo) or future (30 days) visit within the authorizing provider's specialty     Patient has had an office visit with the authorizing provider or a provider within the authorizing providers department within the previous 12 mos or has a future within next 30 days. See \"Patient Info\" tab in inbasket, or \"Choose Columns\" in Meds & Orders section of the refill encounter.              Passed - Medication is active on med list        Passed - Patient is age 18 or older        Passed - No active pregnacy on record        Passed - No positive pregnancy test in past 12 months           metoprolol succinate ER (TOPROL XL) 50 MG 24 hr tablet [Pharmacy Med Name: Metoprolol Succinate ER 50MG TB24] 84 tablet      Sig: TAKE 1 TABLET BY MOUTH IN THE MORNING AND TAKE 2 TABLETS AT BEDTIME       Beta-Blockers Protocol Passed - 12/12/2022  4:47 PM        Passed - Blood pressure under 140/90 in past 12 months     BP Readings from Last 3 Encounters:   12/12/22 134/72   11/02/22 120/68   09/28/22 114/59                 Passed - Patient is age 6 or older        Passed - Recent (12 mo) or future (30 days) visit within the authorizing provider's specialty     Patient has had an office visit with the authorizing provider or a provider within the authorizing providers department within the previous 12 mos or has a future within next 30 days. See \"Patient Info\" tab in inbasket, or \"Choose Columns\" in Meds & Orders section of the refill encounter.              Passed - Medication is active on med list           "

## 2022-12-12 NOTE — PROGRESS NOTES
"Answers for HPI/ROS submitted by the patient on 12/12/2022  If you checked off any problems, how difficult have these problems made it for you to do your work, take care of things at home, or get along with other people?: Very difficult  PHQ9 TOTAL SCORE: 19       SUBJECTIVE:   CC: Nena is an 62 year old who presents for preventive health visit.   Patient has been advised of split billing requirements and indicates understanding: Yes  Healthy Habits:     In general, how would you rate your overall health?  Poor    Frequency of exercise:  2-3 days/week    Duration of exercise:  15-30 minutes    Do you usually eat at least 4 servings of fruit and vegetables a day, include whole grains    & fiber and avoid regularly eating high fat or \"junk\" foods?  No    Taking medications regularly:  Yes    Medication side effects:  Not applicable    Ability to successfully perform activities of daily living:  Transportation requires assistance, shopping requires assistance, preparing meals requires assistance, housework requires assistance, bathing requires assistance, laundry requires assistance and medication administration requires assistance    Home Safety:  No safety concerns identified    Hearing Impairment:  No hearing concerns    In the past 6 months, have you been bothered by leaking of urine? Yes    In general, how would you rate your overall mental or emotional health?  Fair      PHQ-2 Total Score: 4    Additional concerns today:  No    Ability to successfully perform activities of daily living: Yes, no assistance needed  Home safety:  none identified   Hearing impairment: Yes,     PHQ 8/17/2022 10/16/2022 12/12/2022   PHQ-9 Total Score 10 14 19   Q9: Thoughts of better off dead/self-harm past 2 weeks Not at all Not at all More than half the days   F/U: Thoughts of suicide or self-harm - - No   F/U: Safety concerns - - No         Patient is here today for an annual wellness visit.  She had recently been seen at [facility] and diagnosed with a urinary tract infection and placed on cephalexin.  That note and laboratory tests were reviewed but urine culture is still pending at this time.  She complains of back pain related to it.  She lives in a group home.  She has some compliance issues with medications but all administration is supervised.  She is accompanied today by somebody from the group home.  He said a refill on the clonazepam as needed but I noted that was provided last week.  There were no other acute concerns today.    Today's PHQ-2 Score:   PHQ-2 ( 1999 Pf[employer]) 12/12/2022   Q1: Little interest or pleasure in doing things 2   Q2: Feeling down, depressed or hopeless 2   PHQ-2 Score 4   PHQ-2 Total Score (12-17 Years)- Positive if 3 or more points; Administer PHQ-A if positive -   Q1: Little interest or pleasure in doing things More than half the days   Q2: Feeling down, depressed or hopeless More than half the days   PHQ-2 Score 4           Social History     Tobacco Use     Smoking status: Never     Smokeless tobacco: Never   Substance Use Topics     Alcohol use: Not Currently     Comment: when younger     If you drink alcohol do you typically have >3 drinks per day or >7 drinks per week? No    Alcohol Use 12/12/2022   Prescreen: >3 drinks/day or >7 drinks/week? Not Applicable   Prescreen: >3 drinks/day or >7 drinks/week? -       Reviewed orders with patient.  Reviewed health maintenance and updated orders accordingly - Yes  Patient Active Problem List   Diagnosis     Osteopenia     Vitamin B12 deficiency without anemia     Hyperlipidemia LDL goal <100     Rotator cuff syndrome     Type 2 diabetes mellitus with mild nonproliferative retinopathy (H)     Illiterate     Irritable bowel syndrome     overweight - BMI >35     Takotsubo cardiomyopathy     CAD (coronary artery disease)     Restless legs syndrome (RLS)     CINDY (obstructive sleep apnea)- mild AHI 10.3     Verbal auditory hallucination     Chronic low back  pain     Schizoaffective disorder, depressive type (H)     Migraine headache     Essential hypertension with goal blood pressure less than 130/80     Health Care Home     Lumbago     Cervicalgia     Cocaine abuse, episodic (H)     Suicidal ideation     Esophageal reflux     Mild nonproliferative diabetic retinopathy (H)     Tardive dyskinesia     Alcohol use     Left cataract     Falls frequently     History of uterine cancer     Psychophysiological insomnia     Dysuria     Asymptomatic postmenopausal status     Abdominal pain, right lower quadrant     Infectious encephalopathy     Non-intractable vomiting with nausea     Thoughts of self harm     Gastroenteritis     Posttraumatic stress disorder     Cervical cancer screening     Type 2 diabetes mellitus with stage 3 chronic kidney disease, with long-term current use of insulin (H)     Positive AIDA (antinuclear antibody)     Hyperglycemia     Pneumonia     Depression with suicidal ideation     Mixed stress and urge urinary incontinence     Chronic pain syndrome     Fibromyalgia     Type 2 diabetes mellitus without complication, with long-term current use of insulin (H)     Dehydration     Hypoglycemia     Acute kidney injury (H)     Diarrhea, unspecified type     2019 novel coronavirus disease (COVID-19)     Chronic pain     Has poorly balanced diet     History of substance abuse (H)     History of suicidal behavior     Acute pyelonephritis     Localized edema     Fatigue, unspecified type     Vitamin D deficiency     DDD (degenerative disc disease), lumbosacral     Impingement syndrome of shoulder region, right     Past Surgical History:   Procedure Laterality Date     CATARACT IOL, RT/LT Bilateral 2017     CHOLECYSTECTOMY       COLONOSCOPY N/A 3/16/2017    Procedure: COLONOSCOPY;  Surgeon: Traci Gonzalez MD;  Location: [facility]     Coronary CTA  5/21/2014     HYSTERECTOMY  1983    uterine cancer yearly pap's per provider.     HYSTERECTOMY        LAPAROSCOPIC CHOLECYSTECTOMY       PHACOEMULSIFICATION CLEAR CORNEA WITH STANDARD INTRAOCULAR LENS IMPLANT Left 2017    Procedure: PHACOEMULSIFICATION CLEAR CORNEA WITH STANDARD INTRAOCULAR LENS IMPLANT;  LEFT EYE PHACOEMULSIFICATION CLEAR CORNEA WITH STANDARD INTRAOCULAR LENS IMPLANT ;  Surgeon: Tyra Diaz MD;  Location:  [facility]     PHACOEMULSIFICATION CLEAR CORNEA WITH STANDARD INTRAOCULAR LENS IMPLANT Right 2017    Procedure: PHACOEMULSIFICATION CLEAR CORNEA WITH STANDARD INTRAOCULAR LENS IMPLANT;  RIGHT EYE PHACOEMULSIFICATION CLEAR CORNEA WITH STANDARD INTRAOCULAR LENS IMPLANT;  Surgeon: Tyra Diaz MD;  Location:  [facility]     RELEASE TRIGGER FINGER  10/11/2012    Left thumb. Procedure: RELEASE TRIGGER FINGER;  LEFT THUMB TRIGGER RELEASE;  Surgeon: Tay Langley MD;  Location:  [facility]     RELEASE TRIGGER FINGER Right 2016    Procedure: RELEASE TRIGGER FINGER;  Surgeon: Albino Castañeda MD;  Location:  OR     [facility] OOPHORECTORMY FOR MALIG, W/BX  1983    UTERINE       Social History     Tobacco Use     Smoking status: Never     Smokeless tobacco: Never   Substance Use Topics     Alcohol use: Not Currently     Comment: when younger     Family History   Problem Relation Age of Onset     Cancer Mother         BLADDER     Respiratory Mother         COPD     Gastrointestinal Disease Mother         CIRRHOSIS OF LI BOLIVAR     Alcohol/Drug Mother      Diabetes Mother      Hypertension Mother      Lipids Mother      C.A.D. Mother      Glaucoma Mother      Alcohol/Drug Sister      Mental Illness Sister      Alcohol/Drug Sister      Psychotic Disorder Sister      Cancer Maternal Grandmother         UNKNOWN TYPE     Cancer Brother         COLON     Cancer - colorectal Brother         IN HIS LATE 30S     Alcohol/Drug Brother          OF HEROIN OVERDOSE AT AGE 22 YRS     Macular Degeneration No family hx of          Current Outpatient Medications   Medication Sig Dispense Refill      acetaminophen (TYLENOL) 500 MG tablet Take 1000mg in the morning and 1000g at night. Take 1 additional dose of 500-1000mg mid-day as needed 200 tablet 11     Alcohol Swabs (EASY TOUCH ALCOHOL PREP MEDIUM) 70 % PADS Apply 1 Units topically 8 times daily 400 each 11     alendronate (FOSAMAX) 70 MG tablet Take 1 tablet (70 mg) by mouth every 7 days Wednesdays - Take with water, 30 minutes before breakfast. Do not take with any other medicines. Sit upright for 30 minutes after taking. 12 tablet 3     amantadine (SYMMETREL) 100 MG capsule TAKE 1 CAPSULE BY MOUTH EVERY MORNING AND TAKE 2 CAPSULES BY MOUTH EVERY EVENING 84 capsule 3     aspirin (ASA) 81 MG EC tablet TAKE 1 TABLET (81MG) BY MOUTH DAILY 90 tablet 1     atorvastatin (LIPITOR) 80 MG tablet TAKE 1 TABLET (80MG) BY MOUTH DAILY 30 tablet 11     blood glucose (NO BRAND SPECIFIED) test strip Use to test blood sugar 10 times daily or as directed. 100 strip 11     calcium carbonate (OS-ALLEN) 1500 (600 Ca) MG tablet Take 1 tablet (600 mg) by mouth daily 90 tablet 3     cephALEXin (KEFLEX) 500 MG capsule Take 500 mg by mouth 3 times daily       clonazePAM (KLONOPIN) 0.5 MG tablet TAKE 1 TABLET BY MOUTH EVERY NIGHT AS NEEDED FOR SLEEP OR ANXIETY (BUBBLE PACK) 28 tablet 5     Continuous Blood Gluc Sensor (DEXCOM G6 SENSOR) MISC Change every 10 days. 3 each 11     Continuous Blood Gluc Transmit (DEXCOM G6 TRANSMITTER) MISC Change every 3 months. 1 each 3     diclofenac (VOLTAREN) 1 % topical gel Apply 4 grams to painful area at bedtime. May use an additional 3 doses during the day as needed 100 g 1     escitalopram (LEXAPRO) 10 MG tablet Take 1 tablet (10 mg) by mouth daily 28 tablet 11     gabapentin (NEURONTIN) 300 MG capsule Take 1 capsule (300 mg) by mouth At Bedtime 30 capsule 3     hydrOXYzine (VISTARIL) 25 MG capsule Take 1 capsule (25 mg) by mouth every 4 hours as needed for anxiety May take nightly for sleep 60 capsule 3     insulin glargine (LANTUS PEN) 100  UNIT/ML pen Inject 33 Units Subcutaneous 2 times daily 60 mL 1     insulin lispro (HUMALOG KWIKPEN) 100 UNIT/ML (1 unit dial) KWIKPEN TAKE 8 UNITS THREE TIMES A DAY WITH MEALS PLUS SLIDING SCALE . ADD 3 UNITS FOR EVERY 50MG/DL OVER 150 WITH MAX 15 mL 1     insulin pen needle (NOVOFINE 30) 30G X 8 MM miscellaneous USE 6 DAILY OR AS DIRECTED 400 each 3     losartan (COZAAR) 100 MG tablet TAKE 1 TABLET BY MOUTH DAILY 28 tablet 11     metoprolol succinate ER (TOPROL XL) 50 MG 24 hr tablet Take 50mg in the morning and 100mg at night 120 tablet 0     mirabegron (MYRBETRIQ) 25 MG 24 hr tablet Take 1 tablet (25 mg) by mouth daily 90 tablet 3     nystatin (MYCOSTATIN) 126707 UNIT/GM external powder Apply topically 2 times daily as needed 45 g 1     ondansetron (ZOFRAN) 4 MG tablet Take 1 tablet (4 mg) by mouth every 8 hours as needed for nausea 10 tablet 1     OneTouch Delica Lancets 33G MISC 1 each as needed (for back up to Dexcom) Use to test blood sugars 1-2 times daily as directed. 100 each 3     order for DME Equipment being ordered: Depends. 30 each 4     order for DME Equipment being ordered: CPAP Supplies. 1 each 0     paliperidone ER (INVEGA) 9 MG 24 hr tablet Take 1 tablet (9 mg) by mouth At Bedtime 28 tablet 3     pantoprazole (PROTONIX) 40 MG EC tablet TAKE 1 TABLET (40 MG) BY MOUTH DAILY 28 tablet 0     QUEtiapine (SEROQUEL) 25 MG tablet Take 1 tablet (25 mg) by mouth At Bedtime 30 tablet 11     senna-docusate (SENOKOT-S/PERICOLACE) 8.6-50 MG tablet Take 1 tablet daily and take an extra tablet with constipation. 60 tablet 10     thin (NO BRAND SPECIFIED) lancets Use with lanceting device. To accompany: Blood Glucose Monitor Brands: per insurance. 100 each 6     topiramate (TOPAMAX) 200 MG tablet TAKE 1 TABLET BY MOUTH DAILY 28 tablet 11     traZODone (DESYREL) 100 MG tablet Take 1 tablet (100 mg) by mouth At Bedtime 90 tablet 3     TRULICITY 3 MG/0.5ML SOPN INJECT 3MG SUBCUTANEOUSLY EVERY 7 DAYS 2 mL 3      vitamin C (ASCORBIC ACID) 500 MG tablet TAKE 2 TABLETS BY MOUTH IN THE MORNING AND TAKE 2 TABLETS BY MOUTH IN THE EVENING 360 tablet 1     zinc oxide (DESITIN) 20 % external ointment Apply topically 3 times daily as needed for irritation (barrier cream) 60 g 3     Allergies   Allergen Reactions     Imidazole Antifungals Hives     Tolerates diflucan     Ketoprofen Itching     Pruritis to topical     Lisinopril Hives     Metformin Other (See Comments)     Patient hospitalized for lactic acidosis - admitting provider suspectd caused by metformin     Metronidazole Hives     Posaconazole Hives     Tolerates diflucan       Breast Cancer Screening:    Breast CA Risk Assessment (FHS-7) 12/12/2022   Do you have a family history of breast, colon, or ovarian cancer? No / Unknown         Mammogram Screening: Recommended mammography every 1-2 years with patient discussion and risk factor consideration  Pertinent mammograms are reviewed under the imaging tab.    History of abnormal Pap smear: Status post benign hysterectomy. Health Maintenance and Surgical History updated.  PAP / HPV Latest Ref Rng & Units 11/1/2019 5/22/2018 12/7/2016   PAP (Historical) - NIL NIL NIL   HPV16 NEG:Negative Negative Negative Negative   HPV18 NEG:Negative Negative Negative Negative   HRHPV NEG:Negative Negative Negative Negative     Reviewed and updated as needed this visit by clinical staff   Tobacco  Allergies  Meds  Problems  Med Hx  Surg Hx  Fam Hx  Soc   Hx        Reviewed and updated as needed this visit by Provider   Tobacco  Allergies  Meds  Problems  Med Hx  Surg Hx  Fam Hx  Soc   Hx           Review of Systems   Constitutional: Negative for chills and fever.   HENT: Negative for congestion, ear pain, hearing loss and sore throat.    Eyes: Positive for visual disturbance. Negative for pain.   Respiratory: Positive for cough and shortness of breath.    Cardiovascular: Positive for peripheral edema. Negative for chest pain and  "palpitations.   Gastrointestinal: Positive for abdominal pain, constipation and nausea. Negative for diarrhea, heartburn and hematochezia.   Breasts:  Negative for tenderness, breast mass and discharge.   Genitourinary: Positive for dysuria, frequency, pelvic pain and urgency. Negative for genital sores, hematuria, vaginal bleeding and vaginal discharge.   Musculoskeletal: Positive for arthralgias and myalgias. Negative for joint swelling.   Skin: Negative for rash.   Neurological: Positive for dizziness, weakness and headaches. Negative for paresthesias.   Psychiatric/Behavioral: Positive for mood changes. The patient is nervous/anxious.           OBJECTIVE:   /72 (BP Location: Left arm, Patient Position: Sitting, Cuff Size: Adult Large)   Pulse 73   Temp 97.3  F (36.3  C) (Temporal)   Resp 18   Ht 1.555 m (5' 1.22\")   Wt 114.8 kg (253 lb)   LMP 01/06/2015 (Exact Date)   SpO2 98%   BMI 47.46 kg/m    Physical Exam  GENERAL: healthy, alert and no distress  EYES: Eyes grossly normal to inspection, PERRL and conjunctivae and sclerae normal  HENT: ear canals and TM's normal, nose and mouth without ulcers or lesions  NECK: no adenopathy, no asymmetry, masses, or scars and thyroid normal to palpation  RESP: lungs clear to auscultation - no rales, rhonchi or wheezes  CV: regular rate and rhythm, normal S1 S2, no S3 or S4, no murmur, click or rub, no peripheral edema and peripheral pulses strong  MS: no gross musculoskeletal defects noted, no edema  SKIN: no suspicious lesions or rashes  NEURO: Normal strength and tone, mentation intact and speech normal  PSYCH: mentation appears normal, affect normal/bright    Diagnostic Test Results:  Labs reviewed in Epic    ASSESSMENT/PLAN:   (Z00.00) Encounter for Medicare annual wellness exam  (primary encounter diagnosis)  Comment: Routine wellness issues reviewed today.  Follow-up for a wellness visit would be recommended in 1 year.  Plan:      (E11.9,  Z79.4) Type 2 " "diabetes mellitus without complication, with long-term current use of insulin (H)  Comment: Control is questionable at this time.  Has had some high sugars at the facility.  Laboratory studies as noted and follow-up with [facility] pharmacy for assistance.  Had Dexcom monitor on today.  Plan: HEMOGLOBIN A1C, Comprehensive metabolic panel         (BMP + Alb, Alk Phos, ALT, AST, Total. Bili,         TP), CBC with platelets and differential, Med         Therapy Management Referral             (Z12.31) Visit for screening mammogram  Comment: Order was placed for screening mammogram.  Plan:      (Z12.31) Encounter for screening mammogram for breast cancer  Comment: As above.  Plan: MA Screen Bilateral w/Yaya             (N10) Acute pyelonephritis  Comment: Currently on cephalexin.  Should be contacted with culture results when available.  Plan:      (I25.10) Coronary artery disease involving native heart without angina pectoris, unspecified vessel or lesion type  Comment: Status of this is not entirely clear.  She has no anginal symptoms and all testing is old.  We will need to look into a little bit further.  Plan:      (F25.0) Schizoaffective disorder, bipolar type (H)  Comment: Requesting referral to psychiatry and given some auditory hallucinations at this time seems reasonable.  Plan: Adult Mental Health  Referral               Patient has been advised of split billing requirements and indicates understanding: Yes      COUNSELING:  Reviewed preventive health counseling, as reflected in patient instructions       Healthy diet/nutrition       Vision screening       Hearing screening       Alcohol Use       Colorectal Cancer Screening      BMI:   Estimated body mass index is 47.46 kg/m  as calculated from the following:    Height as of this encounter: 1.555 m (5' 1.22\").    Weight as of this encounter: 114.8 kg (253 lb).   Weight management plan: Discussed healthy diet and exercise guidelines      She reports that " she has never smoked. She has never used smokeless tobacco.          Albino Rainey MD  [facility]    The patient was provided with suggestions to help her develop a healthy physical lifestyle.  The patient was counseled and encouraged to consider modifying their diet and eating habits. She was provided with information on recommended healthy diet options.  The patient reports that she has difficulty with activities of daily living. I have asked that the patient make a follow up appointment in 12 weeks where this issue will be further evaluated and addressed.  Information on urinary incontinence and treatment options given to patient.  The patient was provided with suggestions to help her develop a healthy emotional lifestyle.  The patient s PHQ-9 score is consistent with moderate depression. She was provided with information regarding depression and was advised to schedule a follow up appointment in 12 weeks to further address this issue.

## 2022-12-12 NOTE — PATIENT INSTRUCTIONS
Patient Education   Personalized Prevention Plan  You are due for the preventive services outlined below.  Your care team is available to assist you in scheduling these services.  If you have already completed any of these items, please share that information with your care team to update in your medical record.  Health Maintenance Due   Topic Date Due     Annual Wellness Visit  12/07/2017     A1C Lab  11/17/2022     Mammogram  12/09/2022     Your Health Risk Assessment indicates you feel you are not in good health    A healthy lifestyle helps keep the body fit and the mind alert. It helps protect you from disease, helps you fight disease, and helps prevent chronic disease (disease that doesn't go away) from getting worse. This is important as you get older and begin to notice twinges in muscles and joints and a decline in the strength and stamina you once took for granted. A healthy lifestyle includes good healthcare, good nutrition, weight control, recreation, and regular exercise. Avoid harmful substances and do what you can to keep safe. Another part of a healthy lifestyle is stay mentally active and socially involved.    Good healthcare     Have a wellness visit every year.     If you have new symptoms, let us know right away. Don't wait until the next checkup.     Take medicines exactly as prescribed and keep your medicines in a safe place. Tell us if your medicine causes problems.   Healthy diet and weight control     Eat 3 or 4 small, nutritious, low-fat, high-fiber meals a day. Include a variety of fruits, vegetables, and whole-grain foods.     Make sure you get enough calcium in your diet. Calcium, vitamin D, and exercise help prevent osteoporosis (bone thinning).     If you live alone, try eating with others when you can. That way you get a good meal and have company while you eat it.     Try to keep a healthy weight. If you eat more calories than your body uses for energy, it will be stored as fat and  you will gain weight.     Recreation   Recreation is not limited to sports and team events. It includes any activity that provides relaxation, interest, enjoyment, and exercise. Recreation provides an outlet for physical, mental, and social energy. It can give a sense of worth and achievement. It can help you stay healthy.    Mental Exercise and Social Involvement  Mental and emotional health is as important as physical health. Keep in touch with friends and family. Stay as active as possible. Continue to learn and challenge yourself.   Things you can do to stay mentally active are:    Learn something new, like a foreign language or musical instrument.     Play SCRABBLE or do crossword puzzles. If you cannot find people to play these games with you at home, you can play them with others on your computer through the Internet.     Join a games club--anything from card games to chess or checkers or lawn bowling.     Start a new hobby.     Go back to school.     Volunteer.     Read.   Keep up with world events.    Understanding USDA MyPlate  The USDA has guidelines to help you make healthy food choices. These are called MyPlate. MyPlate shows the food groups that make up healthy meals using the image of a place setting. Before you eat, think about the healthiest choices for what to put on your plate or in your cup or bowl. To learn more about building a healthy plate, visit www.choosemyplate.gov.    The food groups    Fruits. Any fruit or 100% fruit juice counts as part of the Fruit Group. Fruits may be fresh, canned, frozen, or dried, and may be whole, cut-up, or pureed. Make 1/2 of your plate fruits and vegetables.    Vegetables. Any vegetable or 100% vegetable juice counts as a member of the Vegetable Group. Vegetables may be fresh, frozen, canned, or dried. They can be served raw or cooked and may be whole, cut-up, or mashed. Make 1/2 of your plate fruits and vegetables.    Grains. All foods made from grains are  part of the Grains Group. These include wheat, rice, oats, cornmeal, and barley. Grains are often used to make foods such as bread, pasta, oatmeal, cereal, tortillas, and grits. Grains should be no more than 1/4 of your plate. At least half of your grains should be whole grains.    Protein. This group includes meat, poultry, seafood, beans and peas, eggs, processed soy products (such as tofu), nuts (including nut butters), and seeds. Make protein choices no more than 1/4 of your plate. Meat and poultry choices should be lean or low fat.    Dairy. The Dairy Group includes all fluid milk products and foods made from milk that contain calcium, such as yogurt and cheese. (Foods that have little calcium, such as cream, butter, and cream cheese, are not part of this group.) Most dairy choices should be low-fat or fat-free.    Oils. Oils aren't a food group, but they do contain essential nutrients. However it's important to watch your intake of oils. These are fats that are liquid at room temperature. They include canola, corn, olive, soybean, vegetable, and sunflower oil. Foods that are mainly oil include mayonnaise, certain salad dressings, and soft margarines. You likely already get your daily oil allowance from the foods you eat.  Things to limit  Eating healthy also means limiting these things in your diet:       Salt (sodium). Many processed foods have a lot of sodium. To keep sodium intake down, eat fresh vegetables, meats, poultry, and seafood when possible. Purchase low-sodium, reduced-sodium, or no-salt-added food products at the store. And don't add salt to your meals at home. Instead, season them with herbs and spices such as dill, oregano, cumin, and paprika. Or try adding flavor with lemon or lime zest and juice.    Saturated fat. Saturated fats are most often found in animal products such as beef, pork, and chicken. They are often solid at room temperature, such as butter. To reduce your saturated fat  intake, choose leaner cuts of meat and poultry. And try healthier cooking methods such as grilling, broiling, roasting, or baking. For a simple lower-fat swap, use plain nonfat yogurt instead of mayonnaise when making potato salad or macaroni salad.    Added sugars. These are sugars added to foods. They are in foods such as ice cream, candy, soda, fruit drinks, sports drinks, energy drinks, cookies, pastries, jams, and syrups. Cut down on added sugars by sharing sweet treats with a family member or friend. You can also choose fruit for dessert, and drink water or other unsweetened beverages.     [employer] last reviewed this educational content on 6/1/2020 2000-2021 [employer]. All rights reserved. This information is not intended as a substitute for professional medical care. Always follow your healthcare professional's instructions.        Activities of Daily Living    Your Health Risk Assessment indicates you have difficulties with activities of daily living such as housework, bathing, preparing meals, taking medication, etc. Please make a follow up appointment for us to address this issue in more detail.    Urinary Incontinence, Female (Adult)   Urinary incontinence means loss of bladder control. This problem affects many women, especially as they get older. If you have incontinence, you may be embarrassed to ask for help. But know that this problem can be treated.   Types of Incontinence  There are different types of incontinence. Two of the main types are described here. You can have more than one type.     Stress incontinence. With this type, urine leaks when pressure (stress) is put on the bladder. This may happen when you cough, sneeze, or laugh. Stress incontinence most often occurs because the pelvic floor muscles that support the bladder and urethra are weak. This can happen after pregnancy and vaginal childbirth or a hysterectomy. It can also be due to excess body weight or hormone  changes.    Urge incontinence (also called overactive bladder). With this type, a sudden urge to urinate is felt often. This may happen even though there may not be much urine in the bladder. The need to urinate often during the night is common. Urge incontinence most often occurs because of bladder spasms. This may be due to bladder irritation or infection. Damage to bladder nerves or pelvic muscles, constipation, and certain medicines can also lead to urge incontinence.  Treatment depends on the cause. Further evaluation is needed to find the type you have. This will likely include an exam and certain tests. Based on the results, you and your healthcare provider can then plan treatment. Until a diagnosis is made, the home care tips below can help ease symptoms.   Home care    Do pelvic floor muscle exercises, if they are prescribed. The pelvic floor muscles help support the bladder and urethra. Many women find that their symptoms improve when doing special exercises that strengthen these muscles. To do the exercises, contract the muscles you would use to stop your stream of urine. But do this when you re not urinating. Hold for 10 seconds, then relax. Repeat 10 to 20 times in a row, at least 3 times a day. Your healthcare provider may give you other instructions for how to do the exercises and how often.    Keep a bladder diary. This helps track how often and how much you urinate over a set period of time. Bring this diary with you to your next visit with the provider. The information can help your provider learn more about your bladder problem.    Lose weight, if advised to by your provider. Extra weight puts pressure on the bladder. Your provider can help you create a weight-loss plan that s right for you. This may include exercising more and making certain diet changes.    Don't have foods and drinks that may irritate the bladder. These can include alcohol and caffeinated drinks.    Quit smoking. Smoking and  other tobacco use can lead to a long-term (chronic) cough that strains the pelvic floor muscles. Smoking may also damage the bladder and urethra. Talk with your provider about treatments or methods you can use to quit smoking.    If drinking large amounts of fluid makes you have symptoms, you may be advised to limit your fluid intake. You may also be advised to drink most of your fluids during the day and to limit fluids at night.    If you re worried about urine leakage or accidents, you may wear absorbent pads to catch urine. Change the pads often. This helps reduce discomfort. It may also reduce the risk of skin or bladder infections.    Follow-up care  Follow up with your healthcare provider, or as directed. It may take some to find the right treatment for your problem. But healthy lifestyle changes can be made right away. These include such things as exercising on a regular basis, eating a healthy diet, losing weight (if needed), and quitting smoking. Your treatment plan may include special therapies or medicines. Certain procedures or surgery may also be options. Talk about any questions you have with your provider.   When to seek medical advice  Call the healthcare provider right away if any of these occur:    Fever of 100.4 F (38 C) or higher, or as directed by your provider    Bladder pain or fullness    Belly swelling    Nausea or vomiting    Back pain    Weakness, dizziness, or fainting  [employer] last reviewed this educational content on 1/1/2020 2000-2021 [employer]. All rights reserved. This information is not intended as a substitute for professional medical care. Always follow your healthcare professional's instructions.        Your Health Risk Assessment indicates you feel you are not in good emotional health.    Recreation   Recreation is not limited to sports and team events. It includes any activity that provides relaxation, interest, enjoyment, and exercise. Recreation provides  "an outlet for physical, mental, and social energy. It can give a sense of worth and achievement. It can help you stay healthy.    Mental Exercise and Social Involvement  Mental and emotional health is as important as physical health. Keep in touch with friends and family. Stay as active as possible. Continue to learn and challenge yourself.   Things you can do to stay mentally active are:    Learn something new, like a foreign language or musical instrument.     Play SCRABBLE or do crossword puzzles. If you cannot find people to play these games with you at home, you can play them with others on your computer through the Internet.     Join a games club--anything from card games to chess or checkers or lawn bowling.     Start a new hobby.     Go back to school.     Volunteer.     Read.   Keep up with world events.    Depression and Suicide in Older Adults    Nearly 2 million older Americans have some type of depression. Some of them even take their own lives. Yet depression among older adults is often ignored. Learn the warning signs. You may help spare a loved one needless pain. You may also save a life.   What is depression?  Depression is a common and serious illness that affects the way you think and feel. It is not a normal part of aging, nor is it a sign of weakness, a character flaw, or something you can snap out of. Most people with depression need treatment to get better. The most common symptom is a feeling of deep sadness. People who are depressed also may seem tired and listless. And nothing seems to give them pleasure. It s normal to grieve or be sad sometimes. But sadness lessens or passes with time. Depression rarely goes away or improves on its own. A person with clinical depression can't \"snap out of it.\" Other symptoms of depression are:     Sleeping more or less than normal    Eating more or less than normal    Having headaches, stomachaches, or other pains that don t go away    Feeling " nervous,  empty,  or worthless    Crying a great deal    Thinking or talking about suicide or death    Loss of interest in activities previously enjoyed    Social isolation    Feeling confused or forgetful  What causes it?  The causes of depression aren t fully known. But it is thought to result from a complex blend of these factors:     Biochemistry. Certain chemicals in the brain play a role.    Genes. Depression does run in families.    Life stress. Life stresses can also trigger depression in some people. Older adults often face many stressors, such as death of friends or a spouse, health problems, and financial concerns.    Chronic conditions. This includes conditions such as diabetes, heart disease, or cancer. These can cause symptoms of depression. Medicine side effects can cause changes in thoughts and behaviors.  How you can help  Often, depressed people may not want to ask for help. When they do, they may be ignored. Or, they may receive the wrong treatment. You can help by showing parents and older friends love and support. If they seem depressed, don t lecture the person, ignore the symptoms, or discount the symptoms as a  normal  part of aging -which they are not. Get involved, listen, and show interest and support.   Help them understand that depression is a treatable illness. Tell them you can help them find the right treatment. Offer to go to their healthcare provider's appointment with them for support when the symptoms are discussed. With their approval, contact a local mental health center, social service agency, or hospital about services.   You can be an advocate for him or her at healthcare appointments. Many older adults have chronic illnesses that can cause symptoms of depression. Medicine side effects can change thoughts and behaviors. You can help make sure that the healthcare provider looks at all of these factors. He or she should refer your family member or friend to a mental healthcare  provider when needed. in some cases, untreated depression can lead to a misdiagnosis. A person may be diagnosed with a brain disorder such as dementia. If the healthcare provider does not take the issue of depression seriously, help your family member or friend to find another provider.   Don't be afraid to ask  If you think an older person you care about could be suicidal, ask,  Have you thought about suicide?  Most people will tell you the truth. If they say  yes,  they may already have a plan for how and when they will attempt it. Find out as much as you can. The more detailed the plan, and the easier it is to carry out, the more danger the person is in right now. Tell the person you are there for them and do not want them to harm him or herself. Don't wait to get help for the person. Call the person's healthcare provider, local hospital, or emergency services.   To learn more    National Suicide Prevention Lifeline (crisis hotline) 414-268-AEAB (606-549-5588)    National [place] of Mental Aekaem710-506-2838wdx.[facility].nih.gov    National [place] on Mental Mgfsmhy250-604-6082pny.marah.org    Mental Health Ihjaufq149-464-8541isd.[facility].org    National Suicide Oljhsfz690-ZAQQAPC (195-215-1349)    Call 911  Never leave the person alone. A person who is actively suicidal needs psychiatric care right away. They will need constant supervision. Never leave the person out of sight. Call 911 or the national 24-hour suicide crisis hotline at 864-623-BYVV (146-986-8370). You can also take the person to the closest emergency room.   [employer] last reviewed this educational content on 5/1/2020 2000-2021 [employer]. All rights reserved. This information is not intended as a substitute for professional medical care. Always follow your healthcare professional's instructions.

## 2022-12-13 DIAGNOSIS — M54.50 CHRONIC RIGHT-SIDED LOW BACK PAIN WITHOUT SCIATICA: ICD-10-CM

## 2022-12-13 DIAGNOSIS — K59.00 CONSTIPATION, UNSPECIFIED CONSTIPATION TYPE: ICD-10-CM

## 2022-12-13 DIAGNOSIS — G89.29 CHRONIC RIGHT-SIDED LOW BACK PAIN WITHOUT SCIATICA: ICD-10-CM

## 2022-12-13 DIAGNOSIS — F25.1 SCHIZOAFFECTIVE DISORDER, DEPRESSIVE TYPE (H): ICD-10-CM

## 2022-12-13 RX ORDER — AMOXICILLIN 250 MG
CAPSULE ORAL
Qty: 60 TABLET | Refills: 10 | Status: SHIPPED | OUTPATIENT
Start: 2022-12-13 | End: 2023-11-13

## 2022-12-13 RX ORDER — GABAPENTIN 300 MG/1
300 CAPSULE ORAL AT BEDTIME
Qty: 30 CAPSULE | Refills: 11 | Status: SHIPPED | OUTPATIENT
Start: 2022-12-13 | End: 2023-09-18

## 2022-12-13 NOTE — TELEPHONE ENCOUNTER
"Requested Prescriptions   Pending Prescriptions Disp Refills     senna-docusate (SENOKOT-S/PERICOLACE) 8.6-50 MG tablet 60 tablet 10     Sig: Take 1 tablet daily and take an extra tablet with constipation.       Laxatives Protocol Passed - 12/13/2022  7:55 AM        Passed - Patient is age 6 or older        Passed - Recent (12 mo) or future (30 days) visit within the authorizing provider's specialty     Patient has had an office visit with the authorizing provider or a provider within the authorizing providers department within the previous 12 mos or has a future within next 30 days. See \"Patient Info\" tab in inbasket, or \"Choose Columns\" in Meds & Orders section of the refill encounter.              Passed - Medication is active on med list           gabapentin (NEURONTIN) 300 MG capsule 30 capsule 3     Sig: Take 1 capsule (300 mg) by mouth At Bedtime       There is no refill protocol information for this order        Prescription approved per [facility] Refill Protocol for the Senna.    Routing refill request to provider for review/approval because:  Drug not on the [facility] refill protocol     Pt has an appointment on 03/14/23    Monique Peters RN  [facility]        "

## 2022-12-15 ENCOUNTER — MYC MEDICAL ADVICE (OUTPATIENT)
Dept: PHARMACY | Facility: CLINIC | Age: 62
End: 2022-12-15

## 2022-12-15 DIAGNOSIS — Z79.4 TYPE 2 DIABETES MELLITUS WITH MILD NONPROLIFERATIVE RETINOPATHY WITHOUT MACULAR EDEMA, WITH LONG-TERM CURRENT USE OF INSULIN, UNSPECIFIED LATERALITY (H): ICD-10-CM

## 2022-12-15 DIAGNOSIS — E11.3299 TYPE 2 DIABETES MELLITUS WITH MILD NONPROLIFERATIVE RETINOPATHY WITHOUT MACULAR EDEMA, WITH LONG-TERM CURRENT USE OF INSULIN, UNSPECIFIED LATERALITY (H): ICD-10-CM

## 2022-12-19 RX ORDER — PROCHLORPERAZINE 25 MG/1
SUPPOSITORY RECTAL
Qty: 1 EACH | Refills: 3 | Status: SHIPPED | OUTPATIENT
Start: 2022-12-19 | End: 2024-02-20

## 2022-12-19 RX ORDER — PROCHLORPERAZINE 25 MG/1
SUPPOSITORY RECTAL
Qty: 9 EACH | Refills: 2 | Status: SHIPPED | OUTPATIENT
Start: 2022-12-19 | End: 2023-11-13

## 2023-01-09 DIAGNOSIS — K21.9 GASTROESOPHAGEAL REFLUX DISEASE WITHOUT ESOPHAGITIS: ICD-10-CM

## 2023-01-09 DIAGNOSIS — Z79.4 TYPE 2 DIABETES MELLITUS WITH MILD NONPROLIFERATIVE RETINOPATHY WITHOUT MACULAR EDEMA, WITH LONG-TERM CURRENT USE OF INSULIN, UNSPECIFIED LATERALITY (H): ICD-10-CM

## 2023-01-09 DIAGNOSIS — E11.3299 TYPE 2 DIABETES MELLITUS WITH MILD NONPROLIFERATIVE RETINOPATHY WITHOUT MACULAR EDEMA, WITH LONG-TERM CURRENT USE OF INSULIN, UNSPECIFIED LATERALITY (H): ICD-10-CM

## 2023-01-09 RX ORDER — INSULIN LISPRO 100 [IU]/ML
INJECTION, SOLUTION INTRAVENOUS; SUBCUTANEOUS
Qty: 45 ML | Refills: 11 | Status: SHIPPED | OUTPATIENT
Start: 2023-01-09 | End: 2023-12-12

## 2023-01-09 RX ORDER — PANTOPRAZOLE SODIUM 40 MG/1
40 TABLET, DELAYED RELEASE ORAL DAILY
Qty: 28 TABLET | Refills: 0 | Status: SHIPPED | OUTPATIENT
Start: 2023-01-09 | End: 2023-01-30

## 2023-01-09 NOTE — TELEPHONE ENCOUNTER
"Requested Prescriptions   Pending Prescriptions Disp Refills     pantoprazole (PROTONIX) 40 MG EC tablet [Pharmacy Med Name: Pantoprazole Sodium 40MG TBEC] 28 tablet 0     Sig: TAKE 1 TABLET (40 MG) BY MOUTH DAILY       PPI Protocol Passed - 1/9/2023 10:39 AM        Passed - Not on Clopidogrel (unless Pantoprazole ordered)        Passed - No diagnosis of osteoporosis on record        Passed - Recent (12 mo) or future (30 days) visit within the authorizing provider's specialty     Patient has had an office visit with the authorizing provider or a provider within the authorizing providers department within the previous 12 mos or has a future within next 30 days. See \"Patient Info\" tab in inbasket, or \"Choose Columns\" in Meds & Orders section of the refill encounter.              Passed - Medication is active on med list        Passed - Patient is age 18 or older        Passed - No active pregnacy on record        Passed - No positive pregnancy test in past 12 months           insulin lispro (HUMALOG KWIKPEN) 100 UNIT/ML (1 unit dial) KWIKPEN [Pharmacy Med Name: HumaLOG KwikPen 100UNIT/ML SOPN*] 15 mL 1     Sig: TAKE 8 UNITS THREE TIMES A DAY WITH MEALS PLUS SLIDING SCALE . ADD 3 UNITS FOR EVERY 50MG/DL OVER 150 WITH MAX       Short Acting Insulin Protocol Passed - 1/9/2023 10:39 AM        Passed - Serum creatinine on file in past 12 months     Recent Labs   Lab Test 12/12/22  1445 05/16/19  0658 05/15/19  1834   CR 1.11*   < >  --    CREAT  --   --  0.9    < > = values in this interval not displayed.       Ok to refill medication if creatinine is low          Passed - HgbA1C in past 3 or 6 months     If HgbA1C is 8 or greater, it needs to be on file within the past 3 months.  If less than 8, must be on file within the past 6 months.     Recent Labs   Lab Test 12/12/22  1445 03/24/22  1211 02/25/22  1318   A1C 6.4*   < >  --    62576  --   --  7.7*    < > = values in this interval not displayed.             Passed - " "Medication is active on med list        Passed - Patient is age 18 or older        Passed - Recent (6 mo) or future (30 days) visit within the authorizing provider's specialty     Patient had office visit in the last 6 months or has a visit in the next 30 days with authorizing provider or within the authorizing provider's specialty.  See \"Patient Info\" tab in inbasket, or \"Choose Columns\" in Meds & Orders section of the refill encounter.               Prescription approved per [facility] Refill Protocol.    Giving a alert to switch medication for her Insulin.    Monique Peters RN  [facility]   "

## 2023-01-10 DIAGNOSIS — F25.1 SCHIZOAFFECTIVE DISORDER, DEPRESSIVE TYPE (H): ICD-10-CM

## 2023-01-10 RX ORDER — PALIPERIDONE 9 MG/1
TABLET, EXTENDED RELEASE ORAL
Qty: 28 TABLET | Refills: 3 | Status: SHIPPED | OUTPATIENT
Start: 2023-01-10 | End: 2023-09-18

## 2023-01-29 DIAGNOSIS — F32.A DEPRESSION WITH SUICIDAL IDEATION: Primary | ICD-10-CM

## 2023-01-29 DIAGNOSIS — K21.9 GASTROESOPHAGEAL REFLUX DISEASE WITHOUT ESOPHAGITIS: ICD-10-CM

## 2023-01-29 DIAGNOSIS — R45.851 DEPRESSION WITH SUICIDAL IDEATION: Primary | ICD-10-CM

## 2023-01-29 DIAGNOSIS — Z79.4 TYPE 2 DIABETES MELLITUS WITH HYPERGLYCEMIA, WITH LONG-TERM CURRENT USE OF INSULIN (H): ICD-10-CM

## 2023-01-29 DIAGNOSIS — I10 HTN, GOAL BELOW 140/90: ICD-10-CM

## 2023-01-29 DIAGNOSIS — E11.65 TYPE 2 DIABETES MELLITUS WITH HYPERGLYCEMIA, WITH LONG-TERM CURRENT USE OF INSULIN (H): ICD-10-CM

## 2023-01-29 DIAGNOSIS — R30.0 DYSURIA: ICD-10-CM

## 2023-01-30 RX ORDER — ESCITALOPRAM OXALATE 20 MG/1
TABLET ORAL
Qty: 14 TABLET | Refills: 1 | Status: SHIPPED | OUTPATIENT
Start: 2023-01-30 | End: 2023-04-03

## 2023-01-30 RX ORDER — METOPROLOL SUCCINATE 50 MG/1
TABLET, EXTENDED RELEASE ORAL
Qty: 84 TABLET | Refills: 1 | Status: SHIPPED | OUTPATIENT
Start: 2023-01-30 | End: 2023-03-22

## 2023-01-30 RX ORDER — ASCORBIC ACID 500 MG
TABLET ORAL
Qty: 180 TABLET | Refills: 3 | Status: SHIPPED | OUTPATIENT
Start: 2023-01-30 | End: 2024-01-03

## 2023-01-30 RX ORDER — DULAGLUTIDE 3 MG/.5ML
INJECTION, SOLUTION SUBCUTANEOUS
Qty: 2 ML | Refills: 3 | Status: SHIPPED | OUTPATIENT
Start: 2023-01-30 | End: 2023-07-12

## 2023-01-30 RX ORDER — PANTOPRAZOLE SODIUM 40 MG/1
40 TABLET, DELAYED RELEASE ORAL DAILY
Qty: 28 TABLET | Refills: 0 | Status: SHIPPED | OUTPATIENT
Start: 2023-01-30 | End: 2023-02-22

## 2023-01-30 NOTE — TELEPHONE ENCOUNTER
"Requested Prescriptions   Pending Prescriptions Disp Refills     TRULICITY 3 MG/0.5ML SOPN [Pharmacy Med Name: Trulicity 3MG/0.5ML SOPN] 2 mL 3     Sig: INJECT 3MG SUBCUTANEOUSLY EVERY 7 DAYS       GLP-1 Agonists Protocol Failed - 1/29/2023  3:30 PM        Failed - Normal serum creatinine on file in past 12 months     Recent Labs   Lab Test 12/12/22  1445 05/16/19  0658 05/15/19  1834   CR 1.11*   < >  --    CREAT  --   --  0.9    < > = values in this interval not displayed.       Ok to refill medication if creatinine is low          Passed - HgbA1C in past 3 or 6 months     If HgbA1C is 8 or greater, it needs to be on file within the past 3 months.  If less than 8, must be on file within the past 6 months.     Recent Labs   Lab Test 12/12/22  1445 03/24/22  1211 02/25/22  1318   A1C 6.4*   < >  --    69105  --   --  7.7*    < > = values in this interval not displayed.             Passed - Medication is active on med list        Passed - Patient is age 18 or older        Passed - No active pregnancy on record        Passed - No positive pregnancy test in past 12 months        Passed - Recent (6 mo) or future (30 days) visit within the authorizing provider's specialty     Patient had office visit in the last 6 months or has a visit in the next 30 days with authorizing provider.  See \"Patient Info\" tab in inbasket, or \"Choose Columns\" in Meds & Orders section of the refill encounter.               metoprolol succinate ER (TOPROL XL) 50 MG 24 hr tablet [Pharmacy Med Name: Metoprolol Succinate ER 50MG TB24] 84 tablet 1     Sig: TAKE 1 TABLET BY MOUTH IN THE MORNING AND TAKE 2 TABLETS AT BEDTIME       Beta-Blockers Protocol Passed - 1/29/2023  3:30 PM        Passed - Blood pressure under 140/90 in past 12 months     BP Readings from Last 3 Encounters:   12/12/22 134/72   11/02/22 120/68   09/28/22 114/59                 Passed - Patient is age 6 or older        Passed - Recent (12 mo) or future (30 days) visit within the " "authorizing provider's specialty     Patient has had an office visit with the authorizing provider or a provider within the authorizing providers department within the previous 12 mos or has a future within next 30 days. See \"Patient Info\" tab in inbasket, or \"Choose Columns\" in Meds & Orders section of the refill encounter.              Passed - Medication is active on med list           pantoprazole (PROTONIX) 40 MG EC tablet [Pharmacy Med Name: Pantoprazole Sodium 40MG TBEC] 28 tablet 0     Sig: TAKE 1 TABLET (40 MG) BY MOUTH DAILY       PPI Protocol Passed - 1/29/2023  3:30 PM        Passed - Not on Clopidogrel (unless Pantoprazole ordered)        Passed - No diagnosis of osteoporosis on record        Passed - Recent (12 mo) or future (30 days) visit within the authorizing provider's specialty     Patient has had an office visit with the authorizing provider or a provider within the authorizing providers department within the previous 12 mos or has a future within next 30 days. See \"Patient Info\" tab in inbasket, or \"Choose Columns\" in Meds & Orders section of the refill encounter.              Passed - Medication is active on med list        Passed - Patient is age 18 or older        Passed - No active pregnacy on record        Passed - No positive pregnancy test in past 12 months           escitalopram (LEXAPRO) 20 MG tablet [Pharmacy Med Name: Escitalopram Oxalate 20MG TABS] 14 tablet      Sig: TAKE 1/2 TABLET BY MOUTH DAILY       SSRIs Protocol Passed - 1/29/2023  3:30 PM        Passed - Recent (12 mo) or future (30 days) visit within the authorizing provider's specialty     Patient has had an office visit with the authorizing provider or a provider within the authorizing providers department within the previous 12 mos or has a future within next 30 days. See \"Patient Info\" tab in inbasket, or \"Choose Columns\" in Meds & Orders section of the refill encounter.              Passed - Medication is active on med " list        Passed - Patient is age 18 or older        Passed - No active pregnancy on record        Passed - No positive pregnancy test in last 12 months         Prescription approved per [facility] Refill Protocol.    Routing refill request to provider for review/approval because medication did not pass protocol.    Pt has a appointment on 02/27/23    Monique Peters RN  [facility]

## 2023-02-04 NOTE — PHARMACY-ADMISSION MEDICATION HISTORY
Admission medication history interview status for this patient is complete. See [facility] admission navigator for allergy information, prior to admission medications and immunization status.     Medication history interview source(s): Caregiver (Zheng)  Medication history resources (including written lists, pill bottles, clinic record):None  Primary pharmacy:[facility]    Changes made to PTA medication list:  Added: None  Deleted: Macrobid (completed 7-day course THIS AM, hydrocortisone ointment, ALL eye drops (levofloxacin, prednisolone)  Changed: benztropine dose reduced, citalopram dose reduced, haloperidol dose reduced, Lantus insulin dose increased, clarified entries for APAP, Senna-S    Actions taken by pharmacist (provider contacted, etc):None     Additional medication history information:   - Completed Macrobid 7-day course this AM  - Both once-weekly meds (Vit D3, Trulicity) takes on Tuesdays  - No Novolog mealtime dose today since was not eating    Medication reconciliation/reorder completed by provider prior to medication history? Yes    Do you take OTC medications (eg tylenol, ibuprofen, fish oil, eye/ear drops, etc)? Y(Y/N)    For patients on insulin therapy: Yes  Lantus/levemir/NPH/Mix 70/30 dose:  Y, Lantus (see Med list for doses)   Sliding scale Novolog - No only fixed mealtime Novolog  If Yes, do you have a baseline novolog pre-meal dose:  20 units with meals  Patients eat three meals a day:   Y   How many episodes of hypoglycemia do you have per week: Unknown  How many missed doses do you have per week: Likely none, caregiver gives meds  How many times do you check your blood glucose per day: Unknown   Any Barriers to therapy - Be specific :  cost of medications, comfortable with giving injections (if applicable), comfortable and confident with current diabetes regimen: N      Prior to Admission medications    Medication Sig Last Dose Taking? Auth Provider   BENZTROPINE MESYLATE PO Take 1 mg by  mouth 2 times daily 8/29/2017 at am Yes Unknown, Entered By History   CITALOPRAM HYDROBROMIDE PO Take 15 mg by mouth daily 8/29/2017 at am Yes Unknown, Entered By History   senna-docusate (SENOKOT-S;PERICOLACE) 8.6-50 MG per tablet Take 1 tablet by mouth 2 times daily as needed for constipation 8/28/2017 at Unknown time Yes Unknown, Entered By History   HALOPERIDOL PO Take 5 mg by mouth At Bedtime 8/28/2017 at hs Yes Unknown, Entered By History   insulin glargine (LANTUS) 100 UNIT/ML injection Inject 50 Units Subcutaneous At Bedtime 8/28/2017 at hs Yes Unknown, Entered By History   ACETAMINOPHEN PO Take 1,000 mg by mouth every 6 hours as needed for pain or fever Past Month at Unknown time Yes Unknown, Entered By History   gabapentin (NEURONTIN) 300 MG capsule TAKE 2 CAPSULES (600MG) BY MOUTH THREE TIMES A DAY 8/29/2017 at am Yes Rowena Weber APRN CNP   atorvastatin (LIPITOR) 80 MG tablet TAKE 1 TABLET (80MG) BY MOUTH DAILY 8/29/2017 at am Yes Moraima Da Silva MD   metoprolol (TOPROL-XL) 100 MG 24 hr tablet TAKE 1 TABLET (100MG) BY MOUTH DAILY 8/29/2017 at am Yes Moraima Da Silva MD   polyethylene glycol (MIRALAX/GLYCOLAX) powder TAKE 17 GRAMS (1 CAPFUL) BY MOUTH DAILY 8/29/2017 at am Yes Rowena Weber APRN CNP   NOVOLOG FLEXPEN 100 UNIT/ML soln INJECT 20 UNITS SUBCUTANEOUSLY THREE TIMES A DAY (WITH MEALS), INJECT AN EXTRA 7 UNITS ONCE DAILY FOR BLOOD SUGAR OVER 500 8/28/2017 at pm Yes Rowena Weber APRN CNP   ranitidine (ZANTAC) 300 MG tablet TAKE 1 TABLET (300MG) BY MOUTH AT BEDTIME 8/28/2017 at hs Yes Rowena Weber APRN CNP   vitamin D3 (CHOLECALCIFEROL) 84495 UNITS capsule TAKE 1 CAPSULE (50,000 UNITS) BY MOUTH ONCE A WEEK 8/29/2017 at am Yes Moraima Da Silva MD   losartan (COZAAR) 100 MG tablet TAKE 1 TABLET (100MG) BY MOUTH DAILY 8/29/2017 at am Yes Usman Hodgson PA-C   aspirin (ASPIRIN LOW DOSE) 81 MG EC tablet Take 1 tablet (81 mg) by mouth daily  8/29/2017 at am Yes Usman Hodgson PA-C   ibuprofen (ADVIL,MOTRIN) 600 MG tablet Take 1 tablet (600 mg) by mouth every 4 hours as needed for moderate pain Past Month at Unknown time Yes Jud Real PA-C   melatonin 5 MG CAPS Take 1 capsule by mouth daily At dinnertime 8/28/2017 at hs Yes Jud Real PA-C   ONE TOUCH ULTRA test strip TEST TWICE DAILY AS DIRECTED   Usman Hodgson PA-C   TRULICITY 1.5 MG/0.5ML pen INJECT 1.5MG SUBCUTANEOUSLY EVERY SEVEN DAYS 8/22/2017  Usman Hodgson PA-C   ULTICARE MINI 31G X 6 MM insulin pen needle USE 4 DAILY OR AS DIRECTED   Moraima Da Silva MD   glucose 4 G CHEW Take 2 every 15 minutes for blood sugar <70mg/dL. Recheck blood sugar every 15 minutes until above 70mg/dL, then eat a substantial meal.   Jud Real PA-C   order for DME Hold Novolog if meals are refused.   Jud Real PA-C   blood glucose monitoring (ONE TOUCH DELICA) lancets Use to test blood sugar 2 times daily or as directed.  Ok to substitute alternative if insurance prefers.   Jud Real PA-C   order for DME Equipment being ordered: Jud Santana PA-C            MYASTHENIA GRAVIS

## 2023-02-14 DIAGNOSIS — Z79.4 TYPE 2 DIABETES MELLITUS WITH MILD NONPROLIFERATIVE RETINOPATHY WITHOUT MACULAR EDEMA, WITH LONG-TERM CURRENT USE OF INSULIN, UNSPECIFIED LATERALITY (H): Primary | ICD-10-CM

## 2023-02-14 DIAGNOSIS — E11.3299 TYPE 2 DIABETES MELLITUS WITH MILD NONPROLIFERATIVE RETINOPATHY WITHOUT MACULAR EDEMA, WITH LONG-TERM CURRENT USE OF INSULIN, UNSPECIFIED LATERALITY (H): Primary | ICD-10-CM

## 2023-02-14 DIAGNOSIS — E08.3292: ICD-10-CM

## 2023-02-22 DIAGNOSIS — R35.0 URINARY FREQUENCY: ICD-10-CM

## 2023-02-22 DIAGNOSIS — F25.1 SCHIZOAFFECTIVE DISORDER, DEPRESSIVE TYPE (H): ICD-10-CM

## 2023-02-22 DIAGNOSIS — K21.9 GASTROESOPHAGEAL REFLUX DISEASE WITHOUT ESOPHAGITIS: ICD-10-CM

## 2023-02-22 RX ORDER — MIRABEGRON 25 MG/1
TABLET, FILM COATED, EXTENDED RELEASE ORAL
Qty: 28 TABLET | Refills: 1 | Status: SHIPPED | OUTPATIENT
Start: 2023-02-22 | End: 2023-04-03

## 2023-02-22 RX ORDER — AMANTADINE HYDROCHLORIDE 100 MG/1
CAPSULE, GELATIN COATED ORAL
Qty: 84 CAPSULE | Refills: 3 | Status: SHIPPED | OUTPATIENT
Start: 2023-02-22 | End: 2023-06-20

## 2023-02-22 RX ORDER — PANTOPRAZOLE SODIUM 40 MG/1
40 TABLET, DELAYED RELEASE ORAL DAILY
Qty: 28 TABLET | Refills: 0 | Status: SHIPPED | OUTPATIENT
Start: 2023-02-22 | End: 2023-03-22

## 2023-02-22 NOTE — TELEPHONE ENCOUNTER
"Requested Prescriptions   Pending Prescriptions Disp Refills     amantadine (SYMMETREL) 100 MG capsule [Pharmacy Med Name: Amantadine HCl 100MG CAPS] 84 capsule 3     Sig: TAKE 1 CAPSULE BY MOUTH EVERY MORNING AND TAKE 2 CAPSULES BY MOUTH EVERY EVENING       There is no refill protocol information for this order        pantoprazole (PROTONIX) 40 MG EC tablet [Pharmacy Med Name: Pantoprazole Sodium 40MG TBEC] 28 tablet 0     Sig: TAKE 1 TABLET (40 MG) BY MOUTH DAILY       PPI Protocol Passed - 2/22/2023  1:46 PM        Passed - Not on Clopidogrel (unless Pantoprazole ordered)        Passed - No diagnosis of osteoporosis on record        Passed - Recent (12 mo) or future (30 days) visit within the authorizing provider's specialty     Patient has had an office visit with the authorizing provider or a provider within the authorizing providers department within the previous 12 mos or has a future within next 30 days. See \"Patient Info\" tab in inbasket, or \"Choose Columns\" in Meds & Orders section of the refill encounter.              Passed - Medication is active on med list        Passed - Patient is age 18 or older        Passed - No active pregnacy on record        Passed - No positive pregnancy test in past 12 months           Prescription approved per [facility] Refill Protocol.    Routing refill request to provider for review/approval because medication did not pass protocol.    Pt has a appointment on 03/14/23    Monique Peters RN  [facility]    "

## 2023-02-27 ENCOUNTER — OFFICE VISIT (OUTPATIENT)
Dept: OPHTHALMOLOGY | Facility: CLINIC | Age: 63
End: 2023-02-27
Attending: OPHTHALMOLOGY
Payer: COMMERCIAL

## 2023-02-27 DIAGNOSIS — E08.3292: ICD-10-CM

## 2023-02-27 PROCEDURE — 92134 CPTRZ OPH DX IMG PST SGM RTA: CPT | Performed by: OPHTHALMOLOGY

## 2023-02-27 PROCEDURE — 99213 OFFICE O/P EST LOW 20 MIN: CPT | Mod: GC | Performed by: OPHTHALMOLOGY

## 2023-02-27 PROCEDURE — G0463 HOSPITAL OUTPT CLINIC VISIT: HCPCS | Mod: 25

## 2023-02-27 ASSESSMENT — VISUAL ACUITY
METHOD: SNELLEN - LINEAR
OS_SC+: +2
OS_PH_SC+: +2
OS_PH_SC: 20/40
OD_SC: 20/25
OS_SC: 20/50
OD_SC+: -1

## 2023-02-27 ASSESSMENT — TONOMETRY
OD_IOP_MMHG: 24
IOP_METHOD: TONOPEN
OS_IOP_MMHG: 27

## 2023-02-27 ASSESSMENT — CONF VISUAL FIELD
OS_INFERIOR_TEMPORAL_RESTRICTION: 0
OS_INFERIOR_NASAL_RESTRICTION: 0
OD_NORMAL: 1
OD_INFERIOR_TEMPORAL_RESTRICTION: 0
OD_SUPERIOR_TEMPORAL_RESTRICTION: 0
OS_SUPERIOR_TEMPORAL_RESTRICTION: 0
OS_SUPERIOR_NASAL_RESTRICTION: 0
OD_SUPERIOR_NASAL_RESTRICTION: 0
OS_NORMAL: 1
METHOD: COUNTING FINGERS
OD_INFERIOR_NASAL_RESTRICTION: 0

## 2023-02-27 ASSESSMENT — CUP TO DISC RATIO
OD_RATIO: 0.3
OS_RATIO: 0.3

## 2023-02-27 ASSESSMENT — EXTERNAL EXAM - RIGHT EYE: OD_EXAM: NORMAL

## 2023-02-27 ASSESSMENT — EXTERNAL EXAM - LEFT EYE: OS_EXAM: NORMAL

## 2023-02-27 ASSESSMENT — SLIT LAMP EXAM - LIDS
COMMENTS: NORMAL
COMMENTS: NORMAL

## 2023-02-27 NOTE — NURSING NOTE
Chief Complaints and History of Present Illnesses   Patient presents with     Diabetic Eye Exam Follow Up     Chief Complaint(s) and History of Present Illness(es)     Diabetic Eye Exam Follow Up            Associated symptoms: Negative for photophobia, tearing, flashes and floaters    Diabetes Type: Type 2    Blood Sugars: fluctuates    Pain scale: 0/10          Comments    Nena is here to continue care for Type 2 diabetes mellitus with mild nonproliferative retinopathy without macular edema, with long-term current use of insulin.  Today she states overall vision is about the same as last visit    Lab Results       Component                Value               Date                       A1C                      6.4                 12/12/2022                 A1C                      7.4                 08/17/2022                 A1C                      8.0                 06/21/2022                 A1C                      7.3                 03/24/2022                 A1C                      8.1                 09/25/2021                 A1C                      9.1                 12/01/2020                 A1C                      9.7                 09/15/2020                 BS today ish Veras COT 3:09 PM February 27, 2023

## 2023-02-27 NOTE — PROGRESS NOTES
I have confirmed the patient's CC, HPI and reviewed Past Medical History, Past Surgical History, Social History, Family History, Problem List, Medication List and agree with Tech note.     CC: Follow up Diabetes mellitus retinopathy     Interval history Feb 27, 2023: LCV 8/25/2022. Feels vision today is about the same as last visit. Denies flashes/floaters.     HPI: 63 year old woman with PMH of T2DM here for routine follow up. DMII x 13 years. On oral and insulin. Last hgA1c 6.4% in 12/2022.     OCT Macula 02/27/23   OD: No center involving intra-retinal fluid, stable   OS: No macular edema, stable     Assessment/plan   1. 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 Dry eye syndrome               Artificial tears over the counter               Monitor       3. Myopia OS with secondary amblyopia              Does not wear glasses normally      4.  Pseudophakia both eyes    - s/p Yag cap 08/18/2022    5.  Ocular HTN OU   - IOP 24/27 while squeezing today - unable to seat at slit lamp for applanation     RTC: 6 months DFE and OCT mac each eye for severe NPDR follow-up     Margo Argueta MD  Resident Physician - PGY3  Department of Ophthalmology   [facility]     Attestation:  I have seen and examined the patient with Dr. Argueta and agree with the findings in this note, as well as the interpretations of the diagnostic tests.      Tiburcio Martin MD PhD.  Professor & Chair

## 2023-02-28 ENCOUNTER — TELEPHONE (OUTPATIENT)
Dept: FAMILY MEDICINE | Facility: CLINIC | Age: 63
End: 2023-02-28

## 2023-02-28 NOTE — TELEPHONE ENCOUNTER
Reason for Call:  Form, our goal is to have forms completed with 72 hours, however, some forms may require a visit or additional information.     Type of letter, form or note:  Form (Include title/ description) Incontinence Supply Order Prescription     Who is the form from?: [facility]      Where did the form come from: Faxed over     What clinic location was the form placed at?: [facility]     Where the form was placed: Provider Folder     What number is listed as a contact on the form or fax?:   [phone]       Additional comments:

## 2023-03-01 ENCOUNTER — MEDICAL CORRESPONDENCE (OUTPATIENT)
Dept: HEALTH INFORMATION MANAGEMENT | Facility: CLINIC | Age: 63
End: 2023-03-01

## 2023-03-14 ENCOUNTER — OFFICE VISIT (OUTPATIENT)
Dept: FAMILY MEDICINE | Facility: CLINIC | Age: 63
End: 2023-03-14
Payer: COMMERCIAL

## 2023-03-14 ENCOUNTER — TELEPHONE (OUTPATIENT)
Dept: FAMILY MEDICINE | Facility: CLINIC | Age: 63
End: 2023-03-14

## 2023-03-14 ENCOUNTER — OFFICE VISIT (OUTPATIENT)
Dept: PHARMACY | Facility: CLINIC | Age: 63
End: 2023-03-14
Payer: COMMERCIAL

## 2023-03-14 VITALS
OXYGEN SATURATION: 96 % | SYSTOLIC BLOOD PRESSURE: 122 MMHG | BODY MASS INDEX: 47.39 KG/M2 | RESPIRATION RATE: 19 BRPM | WEIGHT: 251 LBS | HEART RATE: 73 BPM | DIASTOLIC BLOOD PRESSURE: 77 MMHG | HEIGHT: 61 IN | TEMPERATURE: 97.1 F

## 2023-03-14 DIAGNOSIS — E11.65 TYPE 2 DIABETES MELLITUS WITH HYPERGLYCEMIA, WITH LONG-TERM CURRENT USE OF INSULIN (H): ICD-10-CM

## 2023-03-14 DIAGNOSIS — K08.89 PAIN, DENTAL: Primary | ICD-10-CM

## 2023-03-14 DIAGNOSIS — R25.2 LEG CRAMPS: ICD-10-CM

## 2023-03-14 DIAGNOSIS — Z79.4 TYPE 2 DIABETES MELLITUS WITH HYPERGLYCEMIA, WITH LONG-TERM CURRENT USE OF INSULIN (H): ICD-10-CM

## 2023-03-14 DIAGNOSIS — F25.1 SCHIZOAFFECTIVE DISORDER, DEPRESSIVE TYPE (H): ICD-10-CM

## 2023-03-14 DIAGNOSIS — Z79.4 TYPE 2 DIABETES MELLITUS WITH MILD NONPROLIFERATIVE RETINOPATHY WITHOUT MACULAR EDEMA, WITH LONG-TERM CURRENT USE OF INSULIN, UNSPECIFIED LATERALITY (H): Primary | ICD-10-CM

## 2023-03-14 DIAGNOSIS — R35.0 URINARY FREQUENCY: ICD-10-CM

## 2023-03-14 DIAGNOSIS — K08.89 PAIN, DENTAL: ICD-10-CM

## 2023-03-14 DIAGNOSIS — E11.3299 TYPE 2 DIABETES MELLITUS WITH MILD NONPROLIFERATIVE RETINOPATHY WITHOUT MACULAR EDEMA, WITH LONG-TERM CURRENT USE OF INSULIN, UNSPECIFIED LATERALITY (H): Primary | ICD-10-CM

## 2023-03-14 DIAGNOSIS — Z79.899 HIGH RISK MEDICATION USE: ICD-10-CM

## 2023-03-14 LAB
ERYTHROCYTE [DISTWIDTH] IN BLOOD BY AUTOMATED COUNT: 12.8 % (ref 10–15)
HBA1C MFR BLD: 6.5 % (ref 0–5.6)
HCT VFR BLD AUTO: 34.1 % (ref 35–47)
HGB BLD-MCNC: 11 G/DL (ref 11.7–15.7)
MCH RBC QN AUTO: 32.5 PG (ref 26.5–33)
MCHC RBC AUTO-ENTMCNC: 32.3 G/DL (ref 31.5–36.5)
MCV RBC AUTO: 101 FL (ref 78–100)
PLATELET # BLD AUTO: 190 10E3/UL (ref 150–450)
RBC # BLD AUTO: 3.38 10E6/UL (ref 3.8–5.2)
WBC # BLD AUTO: 5.1 10E3/UL (ref 4–11)

## 2023-03-14 PROCEDURE — 83036 HEMOGLOBIN GLYCOSYLATED A1C: CPT | Performed by: FAMILY MEDICINE

## 2023-03-14 PROCEDURE — 85027 COMPLETE CBC AUTOMATED: CPT | Performed by: FAMILY MEDICINE

## 2023-03-14 PROCEDURE — 99605 MTMS BY PHARM NP 15 MIN: CPT | Performed by: PHARMACIST

## 2023-03-14 PROCEDURE — 99214 OFFICE O/P EST MOD 30 MIN: CPT | Performed by: FAMILY MEDICINE

## 2023-03-14 PROCEDURE — 36415 COLL VENOUS BLD VENIPUNCTURE: CPT | Performed by: FAMILY MEDICINE

## 2023-03-14 PROCEDURE — 99607 MTMS BY PHARM ADDL 15 MIN: CPT | Performed by: PHARMACIST

## 2023-03-14 RX ORDER — AMOXICILLIN 500 MG/1
1000 CAPSULE ORAL 2 TIMES DAILY
Qty: 40 CAPSULE | Refills: 0 | Status: SHIPPED | OUTPATIENT
Start: 2023-03-14 | End: 2023-03-16

## 2023-03-14 RX ORDER — MULTIVITAMIN WITH IRON
1 TABLET ORAL AT BEDTIME
Qty: 30 TABLET | Refills: 11 | Status: SHIPPED | OUTPATIENT
Start: 2023-03-14 | End: 2024-03-12

## 2023-03-14 RX ORDER — QUETIAPINE FUMARATE 100 MG/1
100 TABLET, FILM COATED ORAL AT BEDTIME
Status: ON HOLD | COMMUNITY
Start: 2023-03-06 | End: 2023-09-26

## 2023-03-14 ASSESSMENT — PAIN SCALES - GENERAL: PAINLEVEL: WORST PAIN (10)

## 2023-03-14 ASSESSMENT — PATIENT HEALTH QUESTIONNAIRE - PHQ9
SUM OF ALL RESPONSES TO PHQ QUESTIONS 1-9: 22
SUM OF ALL RESPONSES TO PHQ QUESTIONS 1-9: 22

## 2023-03-14 NOTE — LETTER
_  Medication List        Prepared on: Mar 14, 2023     Bring your Medication List when you go to the doctor, hospital, or   emergency room. And, share it with your family or caregivers.     Note any changes to how you take your medications.  Cross out medications when you no longer use them.    Medication How I take it Why I use it Prescriber   acetaminophen (TYLENOL) 500 MG tablet Take 1000mg in the morning and 1000g at night. Take 1 additional dose of 500-1000mg mid-day as needed Chronic pain of both shoulders Siva Junior MD   ADULT ASPIRIN REGIMEN 81 MG EC tablet TAKE 1 TABLET BY MOUTH DAILY Coronary artery disease involving native heart with angina pectoris, unspecified vessel or lesion type (H) Albino Rainey MD   Alcohol Swabs (EASY TOUCH ALCOHOL PREP MEDIUM) 70 % PADS Apply 1 Units topically 8 times daily Type 2 diabetes mellitus  Albino Rainey MD   alendronate (FOSAMAX) 70 MG tablet Take 1 tablet (70 mg) by mouth every 7 days Wednesdays - Take with water, 30 minutes before breakfast. Do not take with any other medicines. Sit upright for 30 minutes after taking. Osteoporosis without current pathological fracture, unspecified osteoporosis type ART Baumann CNP   amantadine (SYMMETREL) 100 MG capsule TAKE 1 CAPSULE BY MOUTH EVERY MORNING AND TAKE 2 CAPSULES BY MOUTH EVERY EVENING Schizoaffective Disorder, Depressive Type (H) Albino Rainey MD   amoxicillin (AMOXIL) 500 MG capsule Take 2 capsules (1,000 mg) by mouth 2 times daily for 10 days Pain, dental Albino Rainey MD   atorvastatin (LIPITOR) 80 MG tablet TAKE 1 TABLET (80MG) BY MOUTH DAILY Hyperlipidemia LDL Goal <100 ART Baumann CNP   blood glucose (NO BRAND SPECIFIED) test strip Use to test blood sugar 10 times daily or as directed. Type 2 diabetes mellitus with stage 3 chronic kidney disease, with long-term current use of insulin, unspecified whether stage 3a or 3b CKD (H)  Albino Rainey MD   calcium carbonate (OS-ALLEN) 1500 (600 Ca) MG tablet Take 1 tablet (600 mg) by mouth daily Osteoporosis without current pathological fracture, unspecified osteoporosis type ART Baumann CNP   clonazePAM (KLONOPIN) 0.5 MG tablet TAKE 1 TABLET BY MOUTH EVERY NIGHT AS NEEDED FOR SLEEP OR ANXIETY (BUBBLE PACK) Schizoaffective Disorder, Bipolar Type (H) Albino Rainey MD   Continuous Blood Gluc Sensor (DEXCOM G6 SENSOR) MISC Change every 10 days. Type 2 diabetes mellitus with mild nonproliferative retinopathy without macular edema, with long-term current use of insulin, unspecified laterality (H) ART Baumann CNP   Continuous Blood Gluc Transmit (DEXCOM G6 TRANSMITTER) MISC Change every 3 months. Type 2 diabetes mellitus with mild nonproliferative retinopathy without macular edema, with long-term current use of insulin, unspecified laterality (H) ART Baumann CNP   diclofenac (VOLTAREN) 1 % topical gel Apply 4 grams to painful area at bedtime. May use an additional 3 doses during the day as needed Rib pain on right side ART Arias CNP   escitalopram (LEXAPRO) 20 MG tablet TAKE 1/2 TABLET BY MOUTH DAILY Depression with suicidal ideation Albino Rainey MD   gabapentin (NEURONTIN) 300 MG capsule Take 1 capsule (300 mg) by mouth At Bedtime Schizoaffective Disorder, Depressive Type (H); Chronic right-sided low back pain without sciatica Albino Rainey MD   hydrOXYzine (VISTARIL) 25 MG capsule Take 1 capsule (25 mg) by mouth every 4 hours as needed for anxiety May take nightly for sleep Psychophysiological Insomnia ART Baumann CNP   insulin glargine (LANTUS PEN) 100 UNIT/ML pen Inject 33 Units Subcutaneous 2 times daily Type 2 diabetes mellitus with mild nonproliferative retinopathy without macular edema, with long-term current use of insulin, unspecified laterality (H) ART Baumann CNP   insulin  lispro (HUMALOG KWIKPEN) 100 UNIT/ML (1 unit dial) KWIKPEN TAKE 8 UNITS THREE TIMES A DAY WITH MEALS PLUS SLIDING SCALE . ADD 3 UNITS FOR EVERY 50MG/DL OVER 150 WITH MAX Type 2 diabetes mellitus with mild nonproliferative retinopathy without macular edema, with long-term current use of insulin, unspecified laterality (H) Albino Rainey MD   insulin pen needle (NOVOFINE 30) 30G X 8 MM miscellaneous USE 6 DAILY OR AS DIRECTED Type 2 diabetes mellitus with mild nonproliferative retinopathy without macular edema, with long-term current use of insulin, unspecified laterality (H) Albino Rainey MD   losartan (COZAAR) 100 MG tablet TAKE 1 TABLET BY MOUTH DAILY Coronary artery disease involving native heart with angina pectoris, unspecified vessel or lesion type (H) Albino Rainey MD   magnesium 250 MG tablet Take 1 tablet (250 mg) by mouth At Bedtime Leg Cramps Albino Rainey MD   metoprolol succinate ER (TOPROL XL) 50 MG 24 hr tablet TAKE 1 TABLET BY MOUTH IN THE MORNING AND TAKE 2 TABLETS AT BEDTIME HTN, goal below 140/90 Albino Rainey MD   MYRBETRIQ 25 MG 24 hr tablet TAKE 1 TABLET BY MOUTH DAILY Urinary Frequency Gisselle Nash PA-C   nystatin (MYCOSTATIN) 103248 UNIT/GM external powder Apply topically 2 times daily as needed Rash and Nonspecific Skin Eruption ART Baumann CNP   ondansetron (ZOFRAN) 4 MG tablet Take 1 tablet (4 mg) by mouth every 8 hours as needed for nausea Nausea ART Baumann CNP   OneTouch Delica Lancets 33G MISC 1 each as needed (for back up to Dexcom) Use to test blood sugars 1-2 times daily as directed. Type 2 diabetes mellitus with mild nonproliferative retinopathy without macular edema, with long-term current use of insulin, unspecified laterality (H) Albino Rainey MD   paliperidone ER (INVEGA) 9 MG 24 hr tablet TAKE 1 TABLET BY MOUTH AT BEDTIME Schizoaffective Disorder, Depressive Type (H) Albino Barillas  MD [facility]   pantoprazole (PROTONIX) 40 MG EC tablet TAKE 1 TABLET (40 MG) BY MOUTH DAILY Gastroesophageal Reflux Disease without Esophagitis Albino Rainey MD   QUEtiapine (SEROQUEL) 100 MG tablet Take 100 mg by mouth At Bedtime  Schizoaffective disorder Maria DsaraiAmada   senna-docusate (SENOKOT-S/PERICOLACE) 8.6-50 MG tablet Take 1 tablet daily and take an extra tablet with constipation. Constipation, unspecified constipation type Albino Rainey MD   thin (NO BRAND SPECIFIED) lancets Use with lanceting device. To accompany: Blood Glucose Monitor Brands: per insurance. Type 2 diabetes mellitus with mild nonproliferative retinopathy without macular edema, with long-term current use of insulin, unspecified laterality (H) ART Arias CNP   topiramate (TOPAMAX) 200 MG tablet TAKE 1 TABLET BY MOUTH DAILY Fibromyalgia; Chronic Pain Syndrome Albino Rainey MD   traZODone (DESYREL) 100 MG tablet Take 1 tablet (100 mg) by mouth At Bedtime Schizoaffective Disorder, Depressive Type (H) Albino Rainey MD   TRULICITY 3 MG/0.5ML SOPN INJECT 3MG SUBCUTANEOUSLY EVERY 7 DAYS Type 2 diabetes mellitus with hyperglycemia, with long-term current use of insulin (H) Albino Rainey MD   vitamin C (ASCORBIC ACID) 500 MG tablet TAKE 2 TABLETS BY MOUTH IN THE MORNING AND TAKE 2 TABLETS BY MOUTH IN THE EVENING Dysuria Gisselle Nash PA-C   zinc oxide (DESITIN) 20 % external ointment Apply topically 3 times daily as needed for irritation (barrier cream) Skin Irritation ART Arias CNP         Add new medications, over-the-counter drugs, herbals, vitamins, or  minerals in the blank rows below.    Medication How I take it Why I use it Prescriber                                      Allergies:      imidazole antifungals; ketoprofen; lisinopril; metformin; metronidazole; posaconazole        Side effects I have had:               Other Information:               My notes and questions:

## 2023-03-14 NOTE — PROGRESS NOTES
Assessment & Plan     Pain, dental  She was complaining of some dental pain today.  A list of dental resources close to where she lives were provided.  There is some evidence of inflammation although no reyes purulent drainage so I did put her on amoxicillin 1000 mg twice daily for 10 days.  She has standing orders for OTC analgesics she can use where she lives.  - amoxicillin (AMOXIL) 500 MG capsule; Take 2 capsules (1,000 mg) by mouth 2 times daily for 10 days    High risk medication use  Her psychiatrist has requested an electrocardiogram apparently regarding concerns about chronic clonazepam use per the staff accompanying the patient today.  It is not clear to me why that would be a concern and she has been on the clonazepam for a number of years related to suppression of parasomnias.  Future order was placed for the EKG and they plan on following up with that tomorrow.  - EKG 12-lead complete w/read - Clinics; Future    Schizoaffective disorder, depressive type (H)  Currently she endorses regular visions of the boogie man which she describes as a person dressed in all black and with red eyes making negative for threatening statements to her that she will see during the night.  Continues to follow with psychiatry at this time.  Previous psychiatrist at our office had considered prazosin for symptoms similar to this and I wrote that down as a suggestion that they could relate to the current psychiatrist outside of [place].    Type 2 diabetes mellitus with hyperglycemia, with long-term current use of insulin (H)  We will check A1c at this time.  Concern for hyperglycemia given complaints of urinary frequency as noted below.  CBC checked due to relative anemia noted at last visit.  - Hemoglobin A1c; Future  - CBC with platelets; Future  - Hemoglobin A1c  - CBC with platelets    Urinary frequency  Occasional dysuria but mostly increased frequency.  Orders were placed for a urine analysis and urine culture.  - UA  with Microscopic reflex to Culture - lab collect; Future  - Urine Culture Aerobic Bacterial - lab collect; Future  - nitroFURantoin macrocrystal-monohydrate (MACROBID) 100 MG capsule; Take 1 capsule (100 mg) by mouth 2 times daily for 7 days    Leg cramps  Has some leg and arm cramps that seem to be mostly nocturnal.  Staff attending with her today thinks the arm discomfort might be related to sleeping up against the bed rail and the leg cramps seems to be more around the knee joint area.  We thought we could try some low-dose magnesium to see if this was helpful if they are indeed muscle cramps and it may also help with some issues of constipation.  - magnesium 250 MG tablet; Take 1 tablet (250 mg) by mouth At Bedtime           Blood sugar testing frequency justification:  Risk of hypoglycemia with medication(s)  Depression Screening Follow Up    PHQ 3/14/2023   PHQ-9 Total Score 22   Q9: Thoughts of better off dead/self-harm past 2 weeks Several days   F/U: Thoughts of suicide or self-harm Yes   F/U: Self harm-plan No   F/U: Self-harm action No   F/U: Safety concerns No     Last PHQ-9 3/14/2023   1.  Little interest or pleasure in doing things 3   2.  Feeling down, depressed, or hopeless 3   3.  Trouble falling or staying asleep, or sleeping too much 3   4.  Feeling tired or having little energy 3   5.  Poor appetite or overeating 2   6.  Feeling bad about yourself 2   7.  Trouble concentrating 2   8.  Moving slowly or restless 3   Q9: Thoughts of better off dead/self-harm past 2 weeks 1   PHQ-9 Total Score 22   Difficulty at work, home, or with people -   In the past two weeks have you had thoughts of suicide or self harm? Yes   Do you have concerns about your personal safety or the safety of others? No   In the past 2 weeks have you thought about a plan or had intention to harm yourself? No   In the past 2 weeks have you acted on these thoughts in any way? No               Follow Up      Follow Up Actions  Taken  Crisis resource information provided in the After Visit Summary  Referred patient back to mental health provider    Discussed the following ways the patient can remain in a safe environment:        Return in about 10 weeks (around 5/23/2023) for BP Recheck, Mood Recheck.    Albino Rainey MD  [facility]    Surjit Barrett is a 63 year old accompanied by her Staff from her living facility, presenting for the following health issues:  Diabetes and Recheck Medication      History of Present Illness       Diabetes:   She presents for follow up of diabetes.  She is checking home blood glucose three times daily. She checks blood glucose before and after meals.  Blood glucose is sometimes over 200 and never under 70. She is aware of hypoglycemia symptoms including shakiness and blurred vision. She has no concerns regarding her diabetes at this time.  She is not experiencing numbness or burning in feet, excessive thirst, blurry vision, weight changes or redness, sores or blisters on feet.         She eats 0-1 servings of fruits and vegetables daily.She consumes 1 sweetened beverage(s) daily.   She is taking medications regularly.    Today's PHQ-9         PHQ-9 Total Score: 22    PHQ-9 Q9 Thoughts of better off dead/self-harm past 2 weeks :   Several days  Thoughts of suicide or self harm: (P) Yes  Self-harm Plan:   (P) No  Self-harm Action:     (P) No  Safety concerns for self or others: (P) No             Patient is here today for routine follow-up.   She is complaining of some muscle cramps involving her leg and arm.  They seem to be mostly nocturnal.  She uses the word cramp but then points to the area around her knee as the location of the pain.  No recent injury.  She is also endorsing some increased frequency of urination.  Staff brought an order from her outside psychiatrist to do an EKG for diagnosis of high risk medication use.  There was some question about the  "appropriateness of her chronic clonazepam use and review of records going back to 2019 shows that it was started for treatment of parasomnias and was a more effective treatment than other things tried previously to that.  The patient herself complains of some dental pain today.  She has an area of swollen gums per her report but no fever or systemic symptoms of illness.  They are looking for a dentist in the area to address some of her concerns.    Review of Systems   Constitutional, HEENT, cardiovascular, pulmonary, gi and gu systems are negative, except as otherwise noted.      Objective    /77 (BP Location: Right arm, Patient Position: Sitting, Cuff Size: Adult Large)   Pulse 73   Temp 97.1  F (36.2  C) (Temporal)   Resp 19   Ht 1.553 m (5' 1.14\")   Wt 113.9 kg (251 lb)   LMP 01/06/2015 (Exact Date)   SpO2 96%   BMI 47.21 kg/m    Body mass index is 47.21 kg/m .  Physical Exam   GENERAL: alert, no distress and over weight  EYES: Eyes grossly normal to inspection, PERRL and conjunctivae and sclerae normal  HENT: normal cephalic/atraumatic, ear canals and TM's normal, nose and mouth without ulcers or lesions, oropharynx clear, oral mucous membranes moist and a couple broken teeth, some dental caries, and generalized root exposure with mild gingival inflammation  NECK: no adenopathy, no asymmetry, masses, or scars and thyroid normal to palpation  RESP: lungs clear to auscultation - no rales, rhonchi or wheezes  CV: regular rate and rhythm, normal S1 S2, no S3 or S4, no murmur, click or rub, no peripheral edema and peripheral pulses strong  MS: no gross musculoskeletal defects noted, no edema  SKIN: no suspicious lesions or rashes  NEURO: Normal strength and tone, sensory exam grossly normal and mentation intact  PSYCH: concentration poor, tangential, affect flat and judgement and insight impaired                    "

## 2023-03-14 NOTE — LETTER
"Recommended To-Do List      Prepared on: Mar 14, 2023       You can get the best results from your medications by completing the items on this \"To-Do List.\"      Bring your To-Do List when you go to your doctor. And, share it with your family or caregivers.    My To-Do List:  What we talked about: What I should do:    What my medicines are for, how to know if my medicines are working, made sure my medicines are safe for me and reviewed how to take my medicines.     Take my medicines every day                 "

## 2023-03-14 NOTE — PROGRESS NOTES
Medication Therapy Management (MTM) Encounter    ASSESSMENT:                            Medication Adherence/Access: missing afternoon Novolog at times, no other issues.  Reviewed group home med list, no discrepancies noted.    Dental pain: see PCP notes regarding treatment.  Encouraged with visit to dentist she inform that she is taking alendronate.    Schizoaffective disorder: treating muscle symptoms as cramping and trial of magnesium per PCP recommendation.  Discussed prazosin as a potential medication option for nightmares, but unclear if this will be effective as this does not appear to be related to PTSD. Encouraged her to further discuss symptoms with her new psychiatrist.     Type 2 Diabetes: A1c at goal <8%. Time in target at goal <50%.  Discussed pattern of midday hyperglycemia, encouraged consistency with taking Novolog and importance of diet consistency.       PLAN:                            Start magnesium per PCP  Start amoxicillin per PCP  Continue to work on adherence to mid-day Novolog and diet consistency.     Follow-up: 2 months    SUBJECTIVE/OBJECTIVE:                          Nena Tang is a 63 year old female coming in for a follow-up visit. Today's visit is a co-visit with Albino Rainey. Today's visit is a follow-up MTM visit from 11/2/22     Reason for visit: medication rehceck.    Allergies/ADRs: Reviewed in chart  Past Medical History: Reviewed in chart  Tobacco: She reports that she has never smoked. She has never used smokeless tobacco.  Alcohol: none    Medication Adherence/Access: no issues reported  Medications administered by group home staff. Med list obtained from [facility] for med rec.    Dental pain: describes pain in two different teeth, bothersome and would like to see a dentist.  See PCP notes for additional details.     Schizoaffective disorder: currently taking Invega 9mg at bedtime, quetiapine 100mg at bedtime (increasing dose per psychiatry), escitalopram 10mg  "daily, clonazepam 0.5mg at bedtime, trazodone 100mg at bedtime.   Having a hard time sleeping. Frequently having muscle cramps/\"tight muscles\" in arms and legs, wakes up her and hard to go back to sleep. Continues to have nightmares, describes same nightmare as previous where she is visited by a demon who tells her to kill her sons.  States she is seeing him primarily in nightmares, unclear if present prior to her falling asleep.  She hasn't noticed any change with increasing her dose of quetiapine. Historically clonazepam has been the most effective in managing parasomnia and visions of the demon prior to falling asleep.   Continues drop in center 3 times a week, participating in group exercise.   Therapist - yes  Psychiatry - established care.       Type 2 Diabetes:  Currently taking Lantus 33 units twice daily, Humalog 8 units with meals +sliding scale 3u per 50mg/dL starting glucose 151 and max 23u. Trulicity 3 mg weekly. Side effects: constipation, manages with senna-docusate.  Blood sugar monitoring: Continuous Glucose Monitor. Ranges (glucometer)        Symptoms of low blood sugar? none  Symptoms of high blood sugar? Continues to complain of urinary frequency.   Eye exam: up to date  Foot exam: up to date  Diet: variable diet, at drop in center often having 2 cups of coffee with sugar. Sometimes eating out and forgetting to bring her Novolog.  Sometimes snacking late and night when she can't sleep.  Difficulty associating eating pattern with her glucose pattern as above.   Aspirin: Taking 81mg daily.   Statin: Yes: Atorvastatin.   ACEi/ARB: Yes: Losartan.  Urine Albumin:   Lab Results   Component Value Date    MICROL 11 08/17/2022    MICROL 7 09/15/2020     Lab Results   Component Value Date    A1C 6.4 12/12/2022    A1C 7.4 08/17/2022    A1C 8.0 06/21/2022    A1C 7.3 03/24/2022    A1C 8.1 09/25/2021    A1C 9.1 12/01/2020    A1C 9.7 09/15/2020    A1C 8.6 06/30/2020    A1C 6.2 12/03/2019    A1C 6.6 08/06/2019 " "      Today's Vitals: LMP 01/06/2015 (Exact Date)    BP Readings from Last 1 Encounters:   03/14/23 122/77     Pulse Readings from Last 1 Encounters:   03/14/23 73     Wt Readings from Last 1 Encounters:   03/14/23 251 lb (113.9 kg)     Ht Readings from Last 1 Encounters:   03/14/23 5' 1.14\" (1.553 m)     Estimated body mass index is 47.21 kg/m  as calculated from the following:    Height as of an earlier encounter on 3/14/23: 5' 1.14\" (1.553 m).    Weight as of an earlier encounter on 3/14/23: 251 lb (113.9 kg).    Temp Readings from Last 1 Encounters:   03/14/23 97.1  F (36.2  C) (Temporal)      ----------------      I spent 40 minutes with this patient today. All changes were made via collaborative practice agreement with Albino Rainey MD. A copy of the visit note was provided to the patient's provider(s).    A summary of these recommendations was given to the patient (see AVS from today's appointment with PCP).    Eugenia De Jesus, PharmD, BCACP        Medication Therapy Recommendations  No medication therapy recommendations to display       "

## 2023-03-14 NOTE — COMMUNITY RESOURCES LIST (ENGLISH)
03/14/2023   [facility] - Outpatient Clinics  N/A  For questions about this resource list or additional care needs, please contact your primary care clinic or care manager.  Phone: [phone]   Email: N/A   Address: [address] [place] [address]   Hours: N/A        Dental, Vision, and Hearing       Dental care  1  [facility] Distance: 0.9 miles      In-Person   [address] [place] [address]  Language: English  Hours: Mon 8:00 AM - 5:00 PM , Tue - Wed 8:00 AM - 6:00 PM , Thu 8:00 AM - 5:00 PM , Fri 8:00 AM - 12:00 PM  Fees: Insurance, Self Pay, Sliding Fee   Phone: (649) 795-8743 Email: janet@[employer] Website: https://[employer]/locations/[place]-dental-centers/da-qbduo-mgax-mn/     2  [facility] Distance: 1.16 miles      In-Person   [address] [facility] [address] [place] [address]  Language: English  Hours: Mon - Thu 7:00 AM - 4:00 PM  Fees: Insurance, Self Pay   Phone: (408) 916-5290 Email: info@[employer] Website: http://www.[employer]/          Important Numbers & Websites       Emergency Services   911  City Services   311  Poison Control   (749) 881-7426  Suicide Prevention Lifeline   (563) 419-5296 (TALK)  Child Abuse Hotline   (791) 680-5478 (4-A-Child)  Sexual Assault Hotline   (855) 490-2952 (HOPE)  National Runaway Safeline   (566) 899-2108 (RUNAWAY)  All-Options Talkline   (505) 442-1221  Substance Abuse Referral   (968) 197-4510 (HELP)

## 2023-03-14 NOTE — TELEPHONE ENCOUNTER
Pharmacy calling to notify PCP of noted penicillin allergy they have on file.   Want to confirm Ok to fill amoxicillin or send over alternative Rx if appropriate     Thank you  Cherri MURPHY RN  [facility]

## 2023-03-14 NOTE — TELEPHONE ENCOUNTER
We do not have pcn listed as an allergy.  Review of her chart shows she has used cephalosporins and beta lactam antibiotics in the past so I suspect it should be fine to use the amoxicillin.    Albino Rainey MD

## 2023-03-15 ENCOUNTER — TRANSFERRED RECORDS (OUTPATIENT)
Dept: HEALTH INFORMATION MANAGEMENT | Facility: CLINIC | Age: 63
End: 2023-03-15

## 2023-03-15 ENCOUNTER — ALLIED HEALTH/NURSE VISIT (OUTPATIENT)
Dept: FAMILY MEDICINE | Facility: CLINIC | Age: 63
End: 2023-03-15
Payer: COMMERCIAL

## 2023-03-15 ENCOUNTER — VIRTUAL VISIT (OUTPATIENT)
Dept: UROLOGY | Facility: CLINIC | Age: 63
End: 2023-03-15
Payer: COMMERCIAL

## 2023-03-15 DIAGNOSIS — Z79.4 TYPE 2 DIABETES MELLITUS WITH MILD NONPROLIFERATIVE RETINOPATHY WITHOUT MACULAR EDEMA, WITH LONG-TERM CURRENT USE OF INSULIN, UNSPECIFIED LATERALITY (H): ICD-10-CM

## 2023-03-15 DIAGNOSIS — Z79.899 HIGH RISK MEDICATION USE: ICD-10-CM

## 2023-03-15 DIAGNOSIS — F25.1 SCHIZOAFFECTIVE DISORDER, DEPRESSIVE TYPE (H): ICD-10-CM

## 2023-03-15 DIAGNOSIS — Z91.199 FAILURE TO ATTEND APPOINTMENT: Primary | ICD-10-CM

## 2023-03-15 DIAGNOSIS — E11.3299 TYPE 2 DIABETES MELLITUS WITH MILD NONPROLIFERATIVE RETINOPATHY WITHOUT MACULAR EDEMA, WITH LONG-TERM CURRENT USE OF INSULIN, UNSPECIFIED LATERALITY (H): ICD-10-CM

## 2023-03-15 DIAGNOSIS — K08.89 PAIN, DENTAL: ICD-10-CM

## 2023-03-15 LAB
ALBUMIN UR-MCNC: NEGATIVE MG/DL
APPEARANCE UR: CLEAR
BACTERIA #/AREA URNS HPF: ABNORMAL /HPF
BILIRUB UR QL STRIP: NEGATIVE
COLOR UR AUTO: YELLOW
GLUCOSE UR STRIP-MCNC: NEGATIVE MG/DL
HGB UR QL STRIP: NEGATIVE
KETONES UR STRIP-MCNC: NEGATIVE MG/DL
LEUKOCYTE ESTERASE UR QL STRIP: ABNORMAL
NITRATE UR QL: POSITIVE
PH UR STRIP: 5.5 [PH] (ref 5–7)
RBC #/AREA URNS AUTO: ABNORMAL /HPF
SP GR UR STRIP: 1.02 (ref 1–1.03)
SQUAMOUS #/AREA URNS AUTO: ABNORMAL /LPF
UROBILINOGEN UR STRIP-ACNC: 0.2 E.U./DL
WBC #/AREA URNS AUTO: ABNORMAL /HPF

## 2023-03-15 PROCEDURE — 87086 URINE CULTURE/COLONY COUNT: CPT | Mod: VID | Performed by: PHYSICIAN ASSISTANT

## 2023-03-15 PROCEDURE — 81001 URINALYSIS AUTO W/SCOPE: CPT | Mod: VID | Performed by: PHYSICIAN ASSISTANT

## 2023-03-15 PROCEDURE — 93000 ELECTROCARDIOGRAM COMPLETE: CPT

## 2023-03-15 PROCEDURE — 87186 SC STD MICRODIL/AGAR DIL: CPT | Mod: VID | Performed by: PHYSICIAN ASSISTANT

## 2023-03-15 PROCEDURE — 99207 PR NO CHARGE NURSE ONLY: CPT

## 2023-03-15 PROCEDURE — 87088 URINE BACTERIA CULTURE: CPT | Mod: VID | Performed by: PHYSICIAN ASSISTANT

## 2023-03-15 RX ORDER — NITROFURANTOIN 25; 75 MG/1; MG/1
100 CAPSULE ORAL 2 TIMES DAILY
Qty: 14 CAPSULE | Refills: 0 | Status: SHIPPED | OUTPATIENT
Start: 2023-03-15 | End: 2023-03-22

## 2023-03-15 NOTE — LETTER
Date:March 17, 2023      Provider requested that no letter be sent. Do not send.       [facility]

## 2023-03-15 NOTE — LETTER
3/15/2023       RE: Nena Tang  [address] [place] [address]     Dear Colleague,    Thank you for referring your patient, Nena Tang, to the [facility]. Please see a copy of my visit note below.    No Show      Again, thank you for allowing me to participate in the care of your patient.      Sincerely,    Gisselle Nash PA-C, [facility]

## 2023-03-16 ENCOUNTER — TELEPHONE (OUTPATIENT)
Dept: FAMILY MEDICINE | Facility: CLINIC | Age: 63
End: 2023-03-16
Payer: COMMERCIAL

## 2023-03-16 DIAGNOSIS — K08.89 PAIN, DENTAL: Primary | ICD-10-CM

## 2023-03-16 LAB — BACTERIA UR CULT: ABNORMAL

## 2023-03-16 RX ORDER — CEPHALEXIN 500 MG/1
1000 CAPSULE ORAL 2 TIMES DAILY
Qty: 40 CAPSULE | Refills: 0 | Status: SHIPPED | OUTPATIENT
Start: 2023-03-16 | End: 2023-03-26

## 2023-03-16 NOTE — TELEPHONE ENCOUNTER
Pt was prescribed amoxicillin on 3/14/23 and she has a Penicillin allergy. [facility] has it on their list. Does not know of any side effects. This allergy is not listed in our system.     Have waited to give the medication.    Please advise    Please call [facility] back to give the recommendation.     Monique Peters RN  [facility]

## 2023-03-16 NOTE — TELEPHONE ENCOUNTER
I will go ahead and send in different antibiotic to the pharmacy that should be fine to use in a pcn allergic patient.    It is related but different enough it is considered safe to use.    Albino Rainey MD

## 2023-03-16 NOTE — TELEPHONE ENCOUNTER
Called group home and relayed message, they state they will get that started either still today or right away tomorrow.    Manjinder Cook RN   [facility]

## 2023-03-22 ENCOUNTER — APPOINTMENT (OUTPATIENT)
Dept: CT IMAGING | Facility: CLINIC | Age: 63
End: 2023-03-22
Attending: EMERGENCY MEDICINE
Payer: COMMERCIAL

## 2023-03-22 ENCOUNTER — HOSPITAL ENCOUNTER (EMERGENCY)
Facility: CLINIC | Age: 63
Discharge: HOME | End: 2023-03-22
Attending: EMERGENCY MEDICINE | Admitting: EMERGENCY MEDICINE
Payer: COMMERCIAL

## 2023-03-22 ENCOUNTER — APPOINTMENT (OUTPATIENT)
Dept: GENERAL RADIOLOGY | Facility: CLINIC | Age: 63
End: 2023-03-22
Attending: EMERGENCY MEDICINE
Payer: COMMERCIAL

## 2023-03-22 VITALS
RESPIRATION RATE: 20 BRPM | TEMPERATURE: 98.1 F | HEART RATE: 79 BPM | DIASTOLIC BLOOD PRESSURE: 74 MMHG | SYSTOLIC BLOOD PRESSURE: 154 MMHG | OXYGEN SATURATION: 96 %
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
## 2023-03-22 DIAGNOSIS — K21.9 GASTROESOPHAGEAL REFLUX DISEASE WITHOUT ESOPHAGITIS: ICD-10-CM

## 2023-03-22 DIAGNOSIS — I10 HTN, GOAL BELOW 140/90: ICD-10-CM

## 2023-03-22 DIAGNOSIS — K04.7 DENTAL INFECTION: ICD-10-CM

## 2023-03-22 DIAGNOSIS — S89.92XA LEG INJURY, LEFT, INITIAL ENCOUNTER: ICD-10-CM

## 2023-03-22 LAB
ALBUMIN UR-MCNC: 30 MG/DL
ANION GAP SERPL CALCULATED.3IONS-SCNC: 8 MMOL/L (ref 7–15)
APPEARANCE UR: CLEAR
BASOPHILS # BLD AUTO: 0 10E3/UL (ref 0–0.2)
BASOPHILS NFR BLD AUTO: 1 %
BILIRUB UR QL STRIP: NEGATIVE
BUN SERPL-MCNC: 19.3 MG/DL (ref 8–23)
CALCIUM SERPL-MCNC: 8.8 MG/DL (ref 8.8–10.2)
CHLORIDE SERPL-SCNC: 109 MMOL/L (ref 98–107)
COLOR UR AUTO: ABNORMAL
CREAT SERPL-MCNC: 1.36 MG/DL (ref 0.51–0.95)
DEPRECATED HCO3 PLAS-SCNC: 22 MMOL/L (ref 22–29)
EOSINOPHIL # BLD AUTO: 0.2 10E3/UL (ref 0–0.7)
EOSINOPHIL NFR BLD AUTO: 4 %
ERYTHROCYTE [DISTWIDTH] IN BLOOD BY AUTOMATED COUNT: 12.5 % (ref 10–15)
GFR SERPL CREATININE-BSD FRML MDRD: 44 ML/MIN/1.73M2
GLUCOSE SERPL-MCNC: 113 MG/DL (ref 70–99)
GLUCOSE UR STRIP-MCNC: NEGATIVE MG/DL
HCT VFR BLD AUTO: 31.1 % (ref 35–47)
HGB BLD-MCNC: 9.9 G/DL (ref 11.7–15.7)
HGB UR QL STRIP: NEGATIVE
HYALINE CASTS: 1 /LPF
IMM GRANULOCYTES # BLD: 0 10E3/UL
IMM GRANULOCYTES NFR BLD: 1 %
KETONES UR STRIP-MCNC: NEGATIVE MG/DL
LEUKOCYTE ESTERASE UR QL STRIP: NEGATIVE
LYMPHOCYTES # BLD AUTO: 1.2 10E3/UL (ref 0.8–5.3)
LYMPHOCYTES NFR BLD AUTO: 20 %
MCH RBC QN AUTO: 32.4 PG (ref 26.5–33)
MCHC RBC AUTO-ENTMCNC: 31.8 G/DL (ref 31.5–36.5)
MCV RBC AUTO: 102 FL (ref 78–100)
MONOCYTES # BLD AUTO: 0.6 10E3/UL (ref 0–1.3)
MONOCYTES NFR BLD AUTO: 11 %
MUCOUS THREADS #/AREA URNS LPF: PRESENT /LPF
NEUTROPHILS # BLD AUTO: 3.6 10E3/UL (ref 1.6–8.3)
NEUTROPHILS NFR BLD AUTO: 63 %
NITRATE UR QL: NEGATIVE
NRBC # BLD AUTO: 0 10E3/UL
NRBC BLD AUTO-RTO: 0 /100
PH UR STRIP: 5.5 [PH] (ref 5–7)
PLATELET # BLD AUTO: 162 10E3/UL (ref 150–450)
POTASSIUM SERPL-SCNC: 4.5 MMOL/L (ref 3.4–5.3)
RBC # BLD AUTO: 3.06 10E6/UL (ref 3.8–5.2)
RBC URINE: 0 /HPF
SODIUM SERPL-SCNC: 139 MMOL/L (ref 136–145)
SP GR UR STRIP: 1.02 (ref 1–1.03)
UROBILINOGEN UR STRIP-MCNC: NORMAL MG/DL
WBC # BLD AUTO: 5.7 10E3/UL (ref 4–11)
WBC URINE: 2 /HPF

## 2023-03-22 PROCEDURE — 85025 COMPLETE CBC W/AUTO DIFF WBC: CPT | Performed by: EMERGENCY MEDICINE

## 2023-03-22 PROCEDURE — 99285 EMERGENCY DEPT VISIT HI MDM: CPT | Mod: 25

## 2023-03-22 PROCEDURE — 73552 X-RAY EXAM OF FEMUR 2/>: CPT | Mod: LT

## 2023-03-22 PROCEDURE — 80048 BASIC METABOLIC PNL TOTAL CA: CPT | Performed by: EMERGENCY MEDICINE

## 2023-03-22 PROCEDURE — 81003 URINALYSIS AUTO W/O SCOPE: CPT | Performed by: EMERGENCY MEDICINE

## 2023-03-22 PROCEDURE — 73502 X-RAY EXAM HIP UNI 2-3 VIEWS: CPT

## 2023-03-22 PROCEDURE — 36415 COLL VENOUS BLD VENIPUNCTURE: CPT | Performed by: EMERGENCY MEDICINE

## 2023-03-22 PROCEDURE — 70450 CT HEAD/BRAIN W/O DYE: CPT

## 2023-03-22 RX ORDER — METOPROLOL SUCCINATE 50 MG/1
TABLET, EXTENDED RELEASE ORAL
Qty: 84 TABLET | Refills: 1 | Status: SHIPPED | OUTPATIENT
Start: 2023-03-22 | End: 2023-05-23

## 2023-03-22 RX ORDER — ACETAMINOPHEN 325 MG/1
650 TABLET ORAL ONCE
Status: COMPLETED | OUTPATIENT
Start: 2023-03-22 | End: 2023-03-22

## 2023-03-22 RX ORDER — AMPICILLIN AND SULBACTAM 2; 1 G/1; G/1
3 INJECTION, POWDER, FOR SOLUTION INTRAMUSCULAR; INTRAVENOUS ONCE
Status: DISCONTINUED | OUTPATIENT
Start: 2023-03-22 | End: 2023-03-22 | Stop reason: SINTOL

## 2023-03-22 RX ORDER — PANTOPRAZOLE SODIUM 40 MG/1
40 TABLET, DELAYED RELEASE ORAL DAILY
Qty: 28 TABLET | Refills: 0 | Status: SHIPPED | OUTPATIENT
Start: 2023-03-22 | End: 2023-04-03

## 2023-03-22 ASSESSMENT — ACTIVITIES OF DAILY LIVING (ADL): ADLS_ACUITY_SCORE: 37

## 2023-03-22 ASSESSMENT — ENCOUNTER SYMPTOMS
SHORTNESS OF BREATH: 0
WEAKNESS: 1
FEVER: 0
SLEEP DISTURBANCE: 1
COUGH: 0
FATIGUE: 1

## 2023-03-22 NOTE — DISCHARGE INSTRUCTIONS
Stop the Macrodantin/Nitrofurantoin antibiotic    Continue and finish the Cephalexin/Keflex antibiotic    Drink plenty of fluids    Follow-up with a Dentist as soon as possible.

## 2023-03-22 NOTE — TELEPHONE ENCOUNTER
"Requested Prescriptions   Pending Prescriptions Disp Refills     metoprolol succinate ER (TOPROL XL) 50 MG 24 hr tablet [Pharmacy Med Name: Metoprolol Succinate ER 50MG TB24] 84 tablet 1     Sig: TAKE 1 TABLET BY MOUTH IN THE MORNING AND TAKE 2 TABLETS AT BEDTIME       Beta-Blockers Protocol Failed - 3/22/2023  5:33 PM        Failed - Blood pressure under 140/90 in past 12 months     BP Readings from Last 3 Encounters:   03/22/23 (!) 154/74   03/14/23 122/77   12/12/22 134/72                 Passed - Patient is age 6 or older        Passed - Recent (12 mo) or future (30 days) visit within the authorizing provider's specialty     Patient has had an office visit with the authorizing provider or a provider within the authorizing providers department within the previous 12 mos or has a future within next 30 days. See \"Patient Info\" tab in inbasket, or \"Choose Columns\" in Meds & Orders section of the refill encounter.              Passed - Medication is active on med list           pantoprazole (PROTONIX) 40 MG EC tablet [Pharmacy Med Name: Pantoprazole Sodium 40MG TBEC] 28 tablet 0     Sig: TAKE 1 TABLET (40 MG) BY MOUTH DAILY       PPI Protocol Passed - 3/22/2023  5:33 PM        Passed - Not on Clopidogrel (unless Pantoprazole ordered)        Passed - No diagnosis of osteoporosis on record        Passed - Recent (12 mo) or future (30 days) visit within the authorizing provider's specialty     Patient has had an office visit with the authorizing provider or a provider within the authorizing providers department within the previous 12 mos or has a future within next 30 days. See \"Patient Info\" tab in inbasket, or \"Choose Columns\" in Meds & Orders section of the refill encounter.              Passed - Medication is active on med list        Passed - Patient is age 18 or older        Passed - No active pregnacy on record        Passed - No positive pregnancy test in past 12 months         Routing refill request to provider " for review/approval because medication did not pass protocol.    Pt has a appointment on 05/23/23    Monique Peters RN  [facility]

## 2023-03-22 NOTE — ED TRIAGE NOTES
Pt comes in via EMS from a residential home in [place] after a fall down one stair. Pt states she was standing and her legs became weak, causing her to slide down the step to the ground. Denies LOC, denies thinners. Pt states she has osteoporosis in her lower extremities and this happens from time to time. She is currently CO 6/10 bilateral lower leg pain only. EMS gave 15 mg of Toradol en route.

## 2023-03-22 NOTE — ED PROVIDER NOTES
"  History     Chief Complaint:  Fall     HPI   Nena Tang is a 63 year old female with a history of CAD, hyperlipidemia, hypertension, cardiomyopathy, type 2 diabetes, and uterine cancer, who presents with after a fall around 2 hours ago wherein the patient was walking down steps outside of her residential house with the help of 3 staff members when her legs became weak and \"gave out\", causing her to slide down a step. The patient could not stand on her own or with the help of staff, and EMS was called. She presents with her  who reports that the patient did not sustain any head injuries. Patient endorses left hip and knee pain.  also endorses long-term sleep disturbance and subsequent fatigue. Denies fever, cough, or shortness of breath. Patient has a current tooth infection and UTI for which she is taking Macrodantin and Keflex.    Independent Historian:   Patient and residential  history as noted above.    Review of External Notes: reviewed the patient's urine culture during a visit on 3/18/23 showing >100,000 CFU/mL proteus mirabilis - resistant to Nitrofurantoin.     ROS:  Review of Systems   Constitutional: Positive for fatigue. Negative for fever.   Respiratory: Negative for cough and shortness of breath.    Musculoskeletal:        (+) L hip pain  (+) L knee pain   Neurological: Positive for weakness.   Psychiatric/Behavioral: Positive for sleep disturbance.   All other systems reviewed and are negative.    Allergies:  Imidazole Antifungals  Ketoprofen  Lisinopril  Metformin  Metronidazole  Posaconazole     Medications:    Aspirin  Alendronate  Amantadine  Atorvastatin  Calcium carbonate  Keflex  Clonazepam  Lexapro  Gabapentin  Hydroxyzine  Insulin glargine  Insulin lispro  Losartan  Magnesium  Metoprolol succinate  Myrpetriq  Macrobid  Nystatin  Ondansetron  Paliperidone  Pantoprazole  Quetiapine  Senna-docusate  Topiramate  Trazodone  Trulicity  Ascorbic " acid  Zinc oxide     Past Medical History:    CAD  Chronic low back pain  Cocaine abuse  Heroin abuse  Hyperlipidemia  Hypertension  IBS  Migraine  Moderate major depression  Obesity  CINDY  Osteopenia  Pneumonia  Schizoaffective disorder  Sepsis  Suicidal intent  Takotsubo cardiomyopathy  Type 2 diabetes mellitus  Uterine cancer    Past Surgical History:    Bilateral cataracts  Cholecystectomy  Colonoscopy  Hysterectomy  Phacoemulsification clear cornea with standard intraocular lens implant, bilateral  Release trigger finger  Oophorectomy for malignancy      Family History:    Alcohol/drug use  CAD  Colorectal cancer  Diabetes  GI disease  Glaucoma  Hypertension  Lipids  Mental illness  Psychotic disorder  Respiratory illness     Social History:  Patient presents with her   Reports that she has never smoked. She has never used smokeless tobacco. She reports that she does not currently use alcohol. She reports that she does not use drugs.  PCP: Albino Rainey     Physical Exam     Patient Vitals for the past 24 hrs:   BP Temp Temp src Pulse Resp SpO2   03/22/23 1120 -- 98.1  F (36.7  C) Oral -- 20 --   03/22/23 1119 -- -- -- -- -- 96 %   03/22/23 1116 (!) 154/74 -- -- 79 -- 95 %        Physical Exam  Nursing note and vitals reviewed.  Constitutional:  Appears well-developed and well-nourished.   HENT:    Mild tenderness to tooth #12 without any gingival swelling.  Head:    Atraumatic.   Mouth/Throat:   Oropharynx is clear and moist. No oropharyngeal exudate.   Eyes:    Pupils are equal, round, and reactive to light.   Neck:    Normal range of motion. Neck supple.      No tracheal deviation present. No thyromegaly present.   Cardiovascular:  Normal rate, regular rhythm, no murmur   Pulmonary/Chest: Breath sounds are clear and equal without wheezes or crackles.  Abdominal:   Soft. Bowel sounds are normal. Exhibits no distension and      no mass. There is no tenderness.      There is no rebound  and no guarding.   Musculoskeletal:  Exhibits no edema. Pain with ROM of left hip and left knee. No visible swelling or deformity. Left ankle and foot are nontender.  Lymphadenopathy:  No cervical adenopathy.   Neurological:   Alert and oriented to person, place, and time.   Skin:    Skin is warm and dry. No rash noted. No pallor.     Emergency Department Course     Imaging:  XR Femur Left 2 Views   Final Result   Impression:      1.  Left femur negative for fracture or bone lesion. Normal left hip   and left knee joint alignment. Mild joint space narrowing in the left   hip without significant arthritic changes. Minimal degenerative   arthritic changes in the left knee. Heavy arterial calcifications in   the femoral and popliteal arteries.      RAJINDER MARTINEZ MD            SYSTEM ID:  [id number]      XR Pelvis w Hip Left 1 View   Final Result   Impression:      1.  Left hip and pelvis negative for fracture or bone lesion. Normal   joint alignment and spacing. Unremarkable lower lumbar spine and SI   joints. Arterial calcifications in the iliac and femoral arteries.      RAJINDER MARTINEZ MD            SYSTEM ID:  [id number]      CT Head w/o Contrast   Final Result   IMPRESSION:    No acute intracranial abnormality.      FELIPE VILLATORO MD            SYSTEM ID:  [id number]         Report per radiology    Laboratory:  Labs Ordered and Resulted from Time of ED Arrival to Time of ED Departure   BASIC METABOLIC PANEL - Abnormal       Result Value    Sodium 139      Potassium 4.5      Chloride 109 (*)     Carbon Dioxide (CO2) 22      Anion Gap 8      Urea Nitrogen 19.3      Creatinine 1.36 (*)     Calcium 8.8      Glucose 113 (*)     GFR Estimate 44 (*)    CBC WITH PLATELETS AND DIFFERENTIAL - Abnormal    WBC Count 5.7      RBC Count 3.06 (*)     Hemoglobin 9.9 (*)     Hematocrit 31.1 (*)      (*)     MCH 32.4      MCHC 31.8      RDW 12.5      Platelet Count 162      % Neutrophils 63      % Lymphocytes  20      % Monocytes 11      % Eosinophils 4      % Basophils 1      % Immature Granulocytes 1      NRBCs per 100 WBC 0      Absolute Neutrophils 3.6      Absolute Lymphocytes 1.2      Absolute Monocytes 0.6      Absolute Eosinophils 0.2      Absolute Basophils 0.0      Absolute Immature Granulocytes 0.0      Absolute NRBCs 0.0     ROUTINE UA WITH MICROSCOPIC REFLEX TO CULTURE - Abnormal    Color Urine Light Yellow      Appearance Urine Clear      Glucose Urine Negative      Bilirubin Urine Negative      Ketones Urine Negative      Specific Gravity Urine 1.021      Blood Urine Negative      pH Urine 5.5      Protein Albumin Urine 30 (*)     Urobilinogen Urine Normal      Nitrite Urine Negative      Leukocyte Esterase Urine Negative      Mucus Urine Present (*)     RBC Urine 0      WBC Urine 2      Hyaline Casts Urine 1        Emergency Department Course & Assessments:       Interventions:  Medications   acetaminophen (TYLENOL) tablet 650 mg (650 mg Oral Not Given 3/22/23 1208)        Assessments:  1158 I entered the patient's room and obtained history.  1345 I rechecked the patient and explained findings. She ambulated well. I believe she is safe for discharge at this time.    Independent Interpretation (X-rays, CTs, rhythm strip):  I reviewed her leg xrays and do not see any fracture.    Consultations/Discussion of Management or Tests:  None        Social Determinants of Health affecting care:   None    Disposition:  The patient was discharged to home.     Impression & Plan    CMS Diagnoses: None    Medical Decision Making:  This patient's sustained a slow fall without any known head injury but injured her left leg which does not show any sign of fracture.  She was ambulatory prior to discharge.  She has had a recent UTI and tooth infection so I reviewed her recent urine culture which shows a bacteria that is resistant to Macrobid so she was told to stop the Macrobid.  She was placed on Keflex for her tooth  infection since she is allergic to penicillin and so she was told to continue and finish the Keflex.  Her urinalysis today does not show any sign of UTI and she does not have any signs of sepsis or significant electrolyte or renal abnormality beyond her baseline renal insufficiency.  Her caregiver was told to have her drink plenty of fluids and have her follow-up with a dentist as soon as possible.  There does not appear to be any sign of facial abscess or dental abscess at this time.  I felt she be safely discharged with close follow-up.  They were told signs and symptoms to return for.    Diagnosis:    ICD-10-CM    1. Fall, initial encounter  W19.XXXA       2. Leg injury, left, initial encounter  S89.92XA       3. Dental infection  K04.7            Discharge Medications:  New Prescriptions    No medications on file      Scribe Disclosure:  Cristobal HUSSEIN Hired, am serving as a scribe at 11:29 AM on 3/22/2023 to document services personally performed by Sherry Ambrose MD based on my observations and the provider's statements to me.     3/22/2023   Sherry Ambrose MD [place], Cheri Lee, MD  03/22/23 5299

## 2023-03-31 DIAGNOSIS — K21.9 GASTROESOPHAGEAL REFLUX DISEASE WITHOUT ESOPHAGITIS: ICD-10-CM

## 2023-03-31 DIAGNOSIS — R35.0 URINARY FREQUENCY: ICD-10-CM

## 2023-04-03 RX ORDER — PANTOPRAZOLE SODIUM 40 MG/1
40 TABLET, DELAYED RELEASE ORAL DAILY
Qty: 28 TABLET | Refills: 0 | Status: SHIPPED | OUTPATIENT
Start: 2023-04-03 | End: 2023-05-23

## 2023-04-03 RX ORDER — MIRABEGRON 25 MG/1
TABLET, FILM COATED, EXTENDED RELEASE ORAL
Qty: 28 TABLET | Refills: 1 | Status: SHIPPED | OUTPATIENT
Start: 2023-04-03 | End: 2023-07-11

## 2023-04-03 NOTE — TELEPHONE ENCOUNTER
"Requested Prescriptions   Pending Prescriptions Disp Refills     pantoprazole (PROTONIX) 40 MG EC tablet [Pharmacy Med Name: Pantoprazole Sodium 40MG TBEC] 28 tablet 0     Sig: TAKE 1 TABLET (40 MG) BY MOUTH DAILY       PPI Protocol Passed - 3/31/2023  4:16 PM        Passed - Not on Clopidogrel (unless Pantoprazole ordered)        Passed - No diagnosis of osteoporosis on record        Passed - Recent (12 mo) or future (30 days) visit within the authorizing provider's specialty     Patient has had an office visit with the authorizing provider or a provider within the authorizing providers department within the previous 12 mos or has a future within next 30 days. See \"Patient Info\" tab in inbasket, or \"Choose Columns\" in Meds & Orders section of the refill encounter.              Passed - Medication is active on med list        Passed - Patient is age 18 or older        Passed - No active pregnacy on record        Passed - No positive pregnancy test in past 12 months           Prescription approved per [facility] Refill Protocol.    Pt has a appointment on 05/23/23    Monique Peters RN  [facility]   "

## 2023-04-15 ENCOUNTER — HEALTH MAINTENANCE LETTER (OUTPATIENT)
Age: 63
End: 2023-04-15

## 2023-05-02 NOTE — PATIENT INSTRUCTIONS
"-Macrobid 100 mg BID x 7days.   -Clotrimazole cream twice daily as needed.   -Call clinic with any questions or concerns.        * BLADDER INFECTION,Female (Adult)    A bladder infection (\"cystitis\" or \"UTI\") usually causes a constant urge to urinate and a burning when passing urine. Urine may be cloudy, smelly or dark. There may be pain in the lower abdomen. A bladder infection occurs when bacteria from the vaginal area enter the bladder opening (urethra). This can occur from sexual intercourse, wearing tight clothing, dehydration and other factors.  HOME CARE:  1. Drink lots of fluids (at least 6-8 glasses a day, unless you must restrict fluids for other medical reasons). This will force the medicine into your urinary system and flush the bacteria out of your body. Cranberry juice has been shown to help clear out the bacteria.  2. Avoid sexual intercourse until your symptoms are gone.  3. A bladder infection is treated with antibiotics. You may also be given Pyridium (generic = phenazopyridine) to reduce the burning sensation. This medicine will cause your urine to become a bright orange color. The orange urine may stain clothing. You may wear a pad or panty-liner to protect clothing.  PREVENTING FUTURE INFECTIONS:  1. Always wipe from front to back after a bowel movement.  2. Keep the genital area clean and dry.  3. Drink plenty of fluids each day to avoid dehydration.  4. Urinate right after intercourse to flush out the bladder.  5. Wear cotton underwear and cotton-lined panty hose; avoid tight-fitting pants.  6. If you are on birth control pills and are having frequent bladder infections, discuss with your doctor.  FOLLOW UP: Return to this facility or see your doctor if ALL symptoms are not gone after three days of treatment.  GET PROMPT MEDICAL ATTENTION if any of the following occur:    Fever over 101 F (38.3 C)    No improvement by the third day of treatment    Increasing back or abdominal pain    Repeated " Prior history consistent with exam     vomiting; unable to keep medicine down    Weakness, dizziness or fainting    Vaginal discharge    Pain, redness or swelling in the labia (outer vaginal area)    [id number] [employer], [address] [place] [address], [place] [address]. All rights reserved. This information is not intended as a substitute for professional medical care. Always follow your healthcare professional's instructions.

## 2023-05-10 ENCOUNTER — APPOINTMENT (OUTPATIENT)
Dept: GENERAL RADIOLOGY | Facility: CLINIC | Age: 63
End: 2023-05-10
Attending: EMERGENCY MEDICINE
Payer: COMMERCIAL

## 2023-05-10 ENCOUNTER — HOSPITAL ENCOUNTER (EMERGENCY)
Facility: CLINIC | Age: 63
Discharge: HOME | End: 2023-05-11
Attending: EMERGENCY MEDICINE | Admitting: EMERGENCY MEDICINE
Payer: COMMERCIAL

## 2023-05-10 DIAGNOSIS — N30.00 ACUTE CYSTITIS WITHOUT HEMATURIA: ICD-10-CM

## 2023-05-10 DIAGNOSIS — R29.6 FALLS FREQUENTLY: ICD-10-CM

## 2023-05-10 DIAGNOSIS — M25.551 HIP PAIN, RIGHT: ICD-10-CM

## 2023-05-10 DIAGNOSIS — M62.81 GENERALIZED MUSCLE WEAKNESS: ICD-10-CM
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
## 2023-05-10 LAB
ALBUMIN UR-MCNC: NEGATIVE MG/DL
ANION GAP SERPL CALCULATED.3IONS-SCNC: 15 MMOL/L (ref 7–15)
APPEARANCE UR: ABNORMAL
ATRIAL RATE - MUSE: 70 BPM
BACTERIA #/AREA URNS HPF: ABNORMAL /HPF
BASOPHILS # BLD AUTO: 0 10E3/UL (ref 0–0.2)
BASOPHILS NFR BLD AUTO: 1 %
BILIRUB UR QL STRIP: NEGATIVE
BUN SERPL-MCNC: 25.5 MG/DL (ref 8–23)
CALCIUM SERPL-MCNC: 10.1 MG/DL (ref 8.8–10.2)
CHLORIDE SERPL-SCNC: 105 MMOL/L (ref 98–107)
COLOR UR AUTO: ABNORMAL
CREAT SERPL-MCNC: 1.44 MG/DL (ref 0.51–0.95)
DEPRECATED HCO3 PLAS-SCNC: 18 MMOL/L (ref 22–29)
DIASTOLIC BLOOD PRESSURE - MUSE: NORMAL MMHG
EOSINOPHIL # BLD AUTO: 0.1 10E3/UL (ref 0–0.7)
EOSINOPHIL NFR BLD AUTO: 2 %
ERYTHROCYTE [DISTWIDTH] IN BLOOD BY AUTOMATED COUNT: 12.7 % (ref 10–15)
GFR SERPL CREATININE-BSD FRML MDRD: 41 ML/MIN/1.73M2
GLUCOSE SERPL-MCNC: 95 MG/DL (ref 70–99)
GLUCOSE UR STRIP-MCNC: NEGATIVE MG/DL
HCT VFR BLD AUTO: 33.3 % (ref 35–47)
HGB BLD-MCNC: 10.8 G/DL (ref 11.7–15.7)
HGB UR QL STRIP: NEGATIVE
IMM GRANULOCYTES # BLD: 0 10E3/UL
IMM GRANULOCYTES NFR BLD: 1 %
INTERPRETATION ECG - MUSE: NORMAL
KETONES UR STRIP-MCNC: NEGATIVE MG/DL
LEUKOCYTE ESTERASE UR QL STRIP: ABNORMAL
LYMPHOCYTES # BLD AUTO: 2.1 10E3/UL (ref 0.8–5.3)
LYMPHOCYTES NFR BLD AUTO: 37 %
MCH RBC QN AUTO: 32.7 PG (ref 26.5–33)
MCHC RBC AUTO-ENTMCNC: 32.4 G/DL (ref 31.5–36.5)
MCV RBC AUTO: 101 FL (ref 78–100)
MONOCYTES # BLD AUTO: 0.5 10E3/UL (ref 0–1.3)
MONOCYTES NFR BLD AUTO: 9 %
NEUTROPHILS # BLD AUTO: 2.9 10E3/UL (ref 1.6–8.3)
NEUTROPHILS NFR BLD AUTO: 50 %
NITRATE UR QL: POSITIVE
NRBC # BLD AUTO: 0 10E3/UL
NRBC BLD AUTO-RTO: 0 /100
P AXIS - MUSE: 26 DEGREES
PH UR STRIP: 5.5 [PH] (ref 5–7)
PLATELET # BLD AUTO: 169 10E3/UL (ref 150–450)
POTASSIUM SERPL-SCNC: 4.8 MMOL/L (ref 3.4–5.3)
PR INTERVAL - MUSE: 158 MS
QRS DURATION - MUSE: 82 MS
QT - MUSE: 378 MS
QTC - MUSE: 408 MS
R AXIS - MUSE: -40 DEGREES
RBC # BLD AUTO: 3.3 10E6/UL (ref 3.8–5.2)
RBC URINE: 1 /HPF
SODIUM SERPL-SCNC: 138 MMOL/L (ref 136–145)
SP GR UR STRIP: 1.01 (ref 1–1.03)
SQUAMOUS EPITHELIAL: 5 /HPF
SYSTOLIC BLOOD PRESSURE - MUSE: NORMAL MMHG
T AXIS - MUSE: -4 DEGREES
TROPONIN T SERPL HS-MCNC: 11 NG/L
TSH SERPL DL<=0.005 MIU/L-ACNC: 1.6 UIU/ML (ref 0.3–4.2)
UROBILINOGEN UR STRIP-MCNC: NORMAL MG/DL
VENTRICULAR RATE- MUSE: 70 BPM
WBC # BLD AUTO: 5.6 10E3/UL (ref 4–11)
WBC URINE: >182 /HPF

## 2023-05-10 PROCEDURE — 84443 ASSAY THYROID STIM HORMONE: CPT | Performed by: EMERGENCY MEDICINE

## 2023-05-10 PROCEDURE — 80048 BASIC METABOLIC PNL TOTAL CA: CPT | Performed by: EMERGENCY MEDICINE

## 2023-05-10 PROCEDURE — 87088 URINE BACTERIA CULTURE: CPT | Performed by: EMERGENCY MEDICINE

## 2023-05-10 PROCEDURE — 84484 ASSAY OF TROPONIN QUANT: CPT | Performed by: EMERGENCY MEDICINE

## 2023-05-10 PROCEDURE — 71046 X-RAY EXAM CHEST 2 VIEWS: CPT

## 2023-05-10 PROCEDURE — 73522 X-RAY EXAM HIPS BI 3-4 VIEWS: CPT

## 2023-05-10 PROCEDURE — 85018 HEMOGLOBIN: CPT | Performed by: EMERGENCY MEDICINE

## 2023-05-10 PROCEDURE — 99285 EMERGENCY DEPT VISIT HI MDM: CPT | Mod: 25

## 2023-05-10 PROCEDURE — 36415 COLL VENOUS BLD VENIPUNCTURE: CPT | Performed by: EMERGENCY MEDICINE

## 2023-05-10 PROCEDURE — 93005 ELECTROCARDIOGRAM TRACING: CPT

## 2023-05-10 PROCEDURE — 250N000013 HC RX MED GY IP 250 OP 250 PS 637: Performed by: EMERGENCY MEDICINE

## 2023-05-10 PROCEDURE — 81001 URINALYSIS AUTO W/SCOPE: CPT | Performed by: EMERGENCY MEDICINE

## 2023-05-10 RX ORDER — CEPHALEXIN 500 MG/1
500 CAPSULE ORAL ONCE
Status: COMPLETED | OUTPATIENT
Start: 2023-05-10 | End: 2023-05-10

## 2023-05-10 RX ORDER — CEPHALEXIN 500 MG/1
500 CAPSULE ORAL 2 TIMES DAILY
Qty: 10 CAPSULE | Refills: 0 | Status: SHIPPED | OUTPATIENT
Start: 2023-05-10 | End: 2023-05-13

## 2023-05-10 RX ADMIN — CEPHALEXIN 500 MG: 500 CAPSULE ORAL at 23:53

## 2023-05-10 ASSESSMENT — ACTIVITIES OF DAILY LIVING (ADL)
ADLS_ACUITY_SCORE: 37

## 2023-05-10 ASSESSMENT — ENCOUNTER SYMPTOMS: WEAKNESS: 1

## 2023-05-10 NOTE — ED NOTES
Rapid Assessment Note    History:   Nena Tang is a 63 year old female with complex past medical history pertinent for CAD, hyperlipidemia, diabetes, and schizoaffective disorder currently living in a group home who presents after a fall and generalized weakness.  Patient was at her psychiatrist office today when she all of a sudden felt weak and fell to the ground because her legs gave out.  Both of her legs felt weak and she ultimately landed on her hip.  She is not sure which that she landed on but is having hip pain in both hips.  She denies any chest pain but does note some chronic shortness of breath.  Chronic shortness of breath is not new today.  She denies any palpitations fever or constitutional symptoms.  Beyond having both legs feeling like they were giving out today as she has no additional neurologic complaints.  Upper extremities have full strength.  She normally uses a walker to ambulate.  She did not pass out    Exam:   BP (!) 164/61   Pulse 71   Temp 97  F (36.1  C) (Temporal)   Resp 18   LMP 01/06/2015 (Exact Date)   SpO2 99%   General: Resting comfortably on the gurney  Head:  The scalp, face, and head appear normal  Eyes:  The pupils are normal    Conjunctivae and sclera appear normal  ENT:    The nose is normal    Ears/pinnae are normal    Mucous membranes moist.  CV:  Regular rate and rhythm.  No murmur.  Resp:  No respiratory distress.  Clear to ascultation bilaterally.  MS:  No deformities to extremities.  No edema.   Skin:  No rash or lesions noted.  Neuro: Speech is normal and fluent  Psych:  Awake. Alert.  Normal affect.      Appropriate interactions     Plan of Care:   In brief, this is a 63 year old female who presents for weakness, bilateral legs giving out, and fall.  Plan for EKG, laboratory work, urinalysis, x-ray of the chest and bilateral hips.    I evaluated the patient and developed an initial plan of care. I discussed this plan and explained that I, or one of my  partners, would be returning to complete the evaluation.     Due to hospital and departmental capacity constraints and prolonged wait times, this patient was evaluated in non-traditional circumstances such as in triage/waiting room, a hallway, etc. I explained the option to wait for a traditional treatment space and apologized for the inconvenience. Given the circumstances, every attempt was made to provide for the patient's comfort and privacy and to perform the most thorough evaluation possible.    Indra Poole MD [facility]  [facility] Indra Staton MD  05/10/23 [id number]

## 2023-05-10 NOTE — ED TRIAGE NOTES
Pt mental health hx. Coming from group home to clinic. Has hx of mobility difficulty. Felt weak in elevator, no syncopal episode, no LOC, hx lower back pain, NSR, DM

## 2023-05-11 VITALS
TEMPERATURE: 97 F | HEART RATE: 71 BPM | OXYGEN SATURATION: 98 % | SYSTOLIC BLOOD PRESSURE: 166 MMHG | DIASTOLIC BLOOD PRESSURE: 81 MMHG | RESPIRATION RATE: 18 BRPM

## 2023-05-11 NOTE — DISCHARGE INSTRUCTIONS
As discussed, your facility will be called tomorrow by our  regarding physical therapy options.  Take the prescribed antibiotics for your urinary tract infection.  It is important you use your walker at all times when you are ambulating.  Do not hesitate to return to the ER if you are unable to care for yourself or if you have any other emergent concerns    Discharge Instructions  Urinary Tract Infection  You or your child have been diagnosed with a urinary tract infection, or UTI. The urinary tract includes the kidneys (which make urine/pee), ureters (the tubes that carry urine/pee from the kidneys to the bladder), the bladder (which stores urine/pee), and urethra (the tube that carries urine/pee out of the bladder). Urinary tract infections occur when bacteria travel up the urethra into the bladder (bladder infection) and, in some cases, from there into the kidneys (kidney infection).  Generally, every Emergency Department visit should have a follow-up clinic visit with either a primary or a specialty clinic/provider. Please follow-up as instructed by your emergency provider today.  Return to the Emergency Department if:  You or your child have severe back pain.  You or your child are vomiting (throwing up) so that you cannot take your medicine.  You or your child have a new fever (had not previously had a fever) over 101 F.  You or your child have confusion or are very weak, or feel very ill.  Your child seems much more ill, will not wake up, will not respond right, or is crying for a long time and will not calm down.  You or your child are showing signs of dehydration. These signs may include decreased urination (pee), dry mouth/gums/tongue, or decreased activity.    Follow-up with your provider:   Children under 24 months need to be seen by their regular provider within one week after a diagnosis of a UTI. It may be necessary to do some more tests to look at the child s kidney or bladder.  You  should begin to feel better within 24 - 48 hours of starting your antibiotic; follow-up with your regular clinic/doctor/provider if this is not the case.    Treatment:   You will be treated with an antibiotic to kill the bacteria. We have to make an educated guess, based on what we know about common bacteria and antibiotics, as to which antibiotic will work for your infection. We will be correct most times but there will be some cases where the antibiotic chosen is not correct (see urine cultures below).  Take a pain medication such as acetaminophen (Tylenol ) or ibuprofen (Advil , Motrin , Nuprin ).  Phenazopyridine (Pyridium , Uristat ) is a prescription medication that numbs the bladder to reduce the burning pain of some UTIs.  The same medication is available in a non-prescription version (Azo-Standard , Urodol ). This medication will change the color of the urine and tears (usually blue or orange). If you wear contacts, do not wear them while taking this medication as they may be stained by the medication.    Urine Cultures:  If indicated, a urine culture may have been performed today. This test generally takes 24-48 hours to complete so the results are not known at this time. The results can confirm that an infection is present but also determine which antibiotic is effective for the specific bacteria that is causing the infection. If your urine culture shows that the antibiotic you were given today will not work to treat your infection, we will attempt to contact you to make arrangements to change the antibiotic. If the culture confirms that the antibiotic is effective for your infection, you will not be contacted. We often recommend follow-up with your regular physician/provider on the culture results regardless of this process.    Antibiotic Warning:   If you have been placed on antibiotics - watch for signs of allergic reaction.  These include rash, lip swelling, difficulty breathing, wheezing, and  "dizziness.  If you develop any of these symptoms, stop the antibiotic immediately and go to an emergency room or urgent care for evaluation.    Probiotics: If you have been given an antibiotic, you may want to also take a probiotic pill or eat yogurt with live cultures. Probiotics have \"good bacteria\" to help your intestines stay healthy. Studies have shown that probiotics help prevent diarrhea and other intestine problems (including C. diff infection) when you take antibiotics. You can buy these without a prescription in the pharmacy section of the store.   If you were given a prescription for medicine here today, be sure to read all of the information (including the package insert) that comes with your prescription.  This will include important information about the medicine, its side effects, and any warnings that you need to know about.  The pharmacist who fills the prescription can provide more information and answer questions you may have about the medicine.  If you have questions or concerns that the pharmacist cannot address, please call or return to the Emergency Department.   Remember that you can always come back to the Emergency Department if you are not able to see your regular provider in the amount of time listed above, if you get any new symptoms, or if there is anything that worries you.    "

## 2023-05-11 NOTE — ED NOTES
I was asked to assist this patient with HC--PT.  Order was placed and I sent the referral.  I will follow and ensure that this patient is serviced by Home [facility].

## 2023-05-11 NOTE — ED PROVIDER NOTES
History     Chief Complaint:  Generalized Weakness       The history is provided by the patient.      Nena Tang is a 63 year old female with a history of CAD, elevated BMI and pneumonia who presents with frequent falls. Patient reports she was walking to her psychiatrists office and began to feel her legs become weak, resulting in a fall. She has been too weak to get herself up on her own, resulting in her presentation to the ED. She has a walker and a cane, but has not been using either. She has seen PT before, but has not been keeping up on her exercises. Her last PT visit was 8-9 months ago. Denies any injuries.       Independent Historian:   Friend at bedside (Shell) reports that the patient has fallen twice in the last 4 days.     Review of External Notes: None     ROS:  Review of Systems   Neurological: Positive for weakness.   All other systems reviewed and are negative.      Allergies:  Imidazole Antifungals  Ketoprofen  Lisinopril  Metformin  Metronidazole  Posaconazole      Medications:    Aspirin  Alendronate  Amantadine  Atorvastatin  Calcium carbonate  Keflex  Clonazepam  Lexapro  Gabapentin  Hydroxyzine  Insulin glargine  Insulin lispro  Losartan  Magnesium  Metoprolol succinate  Myrpetriq  Macrobid  Nystatin  Ondansetron  Paliperidone  Pantoprazole  Quetiapine  Senna-docusate  Topiramate  Trazodone  Trulicity  Ascorbic acid  Zinc oxide      Past Medical History:    CAD  Chronic low back pain  Cocaine abuse  Heroin abuse  Hyperlipidemia  Hypertension  IBS  Migraine  Moderate major depression  Obesity  CINDY  Osteopenia  Pneumonia  Schizoaffective disorder  Sepsis  Suicidal intent  Takotsubo cardiomyopathy  Type 2 diabetes mellitus  Uterine cancer     Past Surgical History:    Bilateral cataracts  Cholecystectomy  Colonoscopy  Hysterectomy  Phacoemulsification clear cornea with standard intraocular lens implant, bilateral  Release trigger finger  Oophorectomy for malignancy       Family History:     Alcohol/drug use  CAD  Colorectal cancer  Diabetes  GI disease  Glaucoma  Hypertension  Lipids  Mental illness  Psychotic disorder  Respiratory illness      Social History:  Presents with friend, Shell.   PCP: Albino Rainey     Physical Exam     Patient Vitals for the past 24 hrs:   BP Temp Temp src Pulse Resp SpO2   05/10/23 2315 (!) 166/81 -- -- -- -- 98 %   05/10/23 2215 (!) 188/79 -- -- -- -- 99 %   05/10/23 1708 (!) 164/61 97  F (36.1  C) Temporal 71 18 99 %        Physical Exam  Eye:  Pupils are equal, round, and reactive.  Extraocular movements intact.    ENT:  No rhinorrhea.  Moist mucus membranes.  Normal tongue and tonsil.    Cardiac:  Regular rate and rhythm.  No murmurs, gallops, or rubs.    Pulmonary:  Clear to auscultation bilaterally.  No wheezes, rales, or rhonchi.    Abdomen:  Positive bowel sounds.  Abdomen is soft and non-distended, without focal tenderness.    Musculoskeletal:  Normal movement of all extremities without evidence for deficit. Mild tenderness over the right hip, though ambulates with a walker without a limp.     Skin:  Warm and dry without rashes.    Neurologic:  Non-focal exam without asymmetric weakness or numbness.     Psychiatric:  Normal affect with appropriate interaction with examiner.      Emergency Department Course   ECG  ECG results from 05/10/23   EKG 12-lead, tracing only     Value    Systolic Blood Pressure     Diastolic Blood Pressure     Ventricular Rate 70    Atrial Rate 70    NE Interval 158    QRS Duration 82        QTc 408    P Axis 26    R AXIS -40    T Axis -4    Interpretation ECG      Sinus rhythm  Left axis deviation  Low voltage QRS  Cannot rule out Anterior infarct (cited on or before 10-AUG-2021)  Abnormal ECG  When compared with ECG of 25-SEP-2021 20:24,  Sinus rhythm has replaced Junctional rhythm  Nonspecific T wave abnormality has replaced inverted T waves in Anterior leads  Nonspecific T wave abnormality now evident in Lateral  leads  Confirmed by GENERATED REPORT, COMPUTER ([id number]),  Josh Araujo ([id number]) on 5/10/2023 7:32:24 PM       *Note: Due to a large number of results and/or encounters for the requested time period, some results have not been displayed. A complete set of results can be found in Results Review.         Imaging:  XR Chest 2 Views   Final Result   IMPRESSION: Heart is normal in size. Lungs are clear.      XR Pelvis and Hip Bilateral 2 Views   Final Result   IMPRESSION: Extensive arterial calcifications. Normal bones, joint spaces and alignment. There is no evidence of fracture or osteonecrosis.         Report per radiology    Laboratory:  Labs Ordered and Resulted from Time of ED Arrival to Time of ED Departure   BASIC METABOLIC PANEL - Abnormal       Result Value    Sodium 138      Potassium 4.8      Chloride 105      Carbon Dioxide (CO2) 18 (*)     Anion Gap 15      Urea Nitrogen 25.5 (*)     Creatinine 1.44 (*)     Calcium 10.1      Glucose 95      GFR Estimate 41 (*)    ROUTINE UA WITH MICROSCOPIC REFLEX TO CULTURE - Abnormal    Color Urine Light Yellow      Appearance Urine Slightly Cloudy (*)     Glucose Urine Negative      Bilirubin Urine Negative      Ketones Urine Negative      Specific Gravity Urine 1.014      Blood Urine Negative      pH Urine 5.5      Protein Albumin Urine Negative      Urobilinogen Urine Normal      Nitrite Urine Positive (*)     Leukocyte Esterase Urine Large (*)     Bacteria Urine Few (*)     RBC Urine 1      WBC Urine >182 (*)     Squamous Epithelials Urine 5 (*)    CBC WITH PLATELETS AND DIFFERENTIAL - Abnormal    WBC Count 5.6      RBC Count 3.30 (*)     Hemoglobin 10.8 (*)     Hematocrit 33.3 (*)      (*)     MCH 32.7      MCHC 32.4      RDW 12.7      Platelet Count 169      % Neutrophils 50      % Lymphocytes 37      % Monocytes 9      % Eosinophils 2      % Basophils 1      % Immature Granulocytes 1      NRBCs per 100 WBC 0      Absolute Neutrophils 2.9      Absolute  Lymphocytes 2.1      Absolute Monocytes 0.5      Absolute Eosinophils 0.1      Absolute Basophils 0.0      Absolute Immature Granulocytes 0.0      Absolute NRBCs 0.0     TROPONIN T, HIGH SENSITIVITY - Normal    Troponin T, High Sensitivity 11     TSH WITH FREE T4 REFLEX - Normal    TSH 1.60     URINE CULTURE      Emergency Department Course & Assessments:    Interventions:  Medications   cephALEXin (KEFLEX) capsule 500 mg (500 mg Oral $Given 5/10/23 0727)        Independent Interpretation (X-rays, CTs, rhythm strip):  None    Assessments/Consultations/Discussion of Management or Tests:  ED Course as of 05/11/23 1618   Wed May 10, 2023   2320 Dr. Trierweiler's evaluation.        Social Determinants of Health affecting care:   None    Disposition:  The patient was discharged to home.     Impression & Plan      CMS Diagnoses: None    Medical Decision Making:  This complicated elderly woman with multiple medical problems presents to us because of weakness today.  Patient notes that she spent some time in a rehab center 6 to 8 months ago.  She notes that she felt much stronger when she returned to her group home.  However, she freely admits that she has walked less and less and has been doing few of her PT exercises.  Today, she had an appointment with her psychiatrist, and had to walk quite a bit further than typical.  She knows that she is supposed to use a walker, but elected not to use a walker or a cane today.  While trying to get into the office and  the elevator, her leg started to shake and she was unable to make it completely to a chair.  However, the  did help her to the ground and she did not suffer any serious injury.  She is complaining of some right-sided hip and back pain, though x-rays are negative.    A broad differential was considered.  Laboratory investigation is all reassuring.  I do not believe that this is related to an acute cardiac or pulmonary event.  She is feeling improved  after resting, and I personally ambulated her down the harden with her walker which she was able to do without any assistance.  At this point, I do feel that she is safe for discharge home and this is her desire as well.  Urinalysis was obtained late in her course which does show evidence of an infection and she will be treated with Keflex.  Otherwise, she does require some physical therapy.  I have spoken with my  who will speak with the patient and group home about figuring out plans for outpatient physical therapy.  Otherwise, the son's questions were answered as are her friends and all parties are comfortable with the plan for discharge back to her group home.    Diagnosis:    ICD-10-CM    1. Acute cystitis without hematuria  N30.00       2. Generalized muscle weakness  M62.81       3. Hip pain, right  M25.551       4. Fall, initial encounter  W19.XXXA            Discharge Medications:  Discharge Medication List as of 5/10/2023 11:43 PM      START taking these medications    Details   cephALEXin (KEFLEX) 500 MG capsule Take 1 capsule (500 mg) by mouth 2 times daily for 5 days, Disp-10 capsule, R-0, Local Print           Scribe Disclosure:  I, DOMINIK MACEDO, am serving as a scribe at 11:27 PM on 5/10/2023 to document services personally performed by Trierweiler, Chad A, MD based on my observations and the provider's statements to me.   5/10/2023   Trierweiler, Chad A, MD Trierweiler, Chad A, MD  05/11/23 2763

## 2023-05-11 NOTE — ED NOTES
Spoke with son, Scot, regarding antibiotic prescription for UTI and plan to set up home physical therapy. Son verbalized understanding.

## 2023-05-12 LAB — BACTERIA UR CULT: ABNORMAL

## 2023-05-13 ENCOUNTER — TELEPHONE (OUTPATIENT)
Dept: EMERGENCY MEDICINE | Facility: CLINIC | Age: 63
End: 2023-05-13
Payer: COMMERCIAL

## 2023-05-13 RX ORDER — NITROFURANTOIN 25; 75 MG/1; MG/1
100 CAPSULE ORAL 2 TIMES DAILY
Qty: 10 CAPSULE | Refills: 0 | Status: SHIPPED | OUTPATIENT
Start: 2023-05-13 | End: 2023-05-18

## 2023-05-13 NOTE — TELEPHONE ENCOUNTER
[facility] () Emergency Department/Urgent Care Lab result notification  [Note:  ED Lab Results RN will reference the [facility] Emergency Dept visit note prior to contacting patient AND/OR prior to consulting Emergency Dept Provider.  Highlights of Emergency Dept visit in information summary at the bottom of this telephone note]    1. Reason for call    Notify of lab results    Assess patient symptoms [if necessary]    Review ED Providers recommendations/discharge instructions (if necessary)    Advise per [facility] ED lab result protocol    2. Lab Result (including Rx patient on, if applicable).  If culture, copy of lab report at bottom.  Final Urine Culture Report on 5/12/23  [facility] Emergency Dept discharge antibiotic prescribed: Cephalexin (Keflex) 500 mg capsule, 1 capsule (500 mg) by mouth 2 times daily for 5 days.  #1. Bacteria, >100,000 CFU/ML Citrobacter freundii,  is [RESISTANT] to antibiotic.   Recommendations in treatment per [facility] ED lab result Urine Culture protocol.    3. RN Assessment (Patient's current Symptoms):    Time of call: 5/13/2023 10:24 AM    Assessment: Writer notes son was present at ED visit - patient resides in a group home setting -  Call attempt to Group home staff/nurse - [place]  RN - unable to leave voice message d/t mailbox full - sent SMS - call attempt #2 after speaking with patient - goes right to Voicemail     Genitourinary symptoms:  A little pain, burning with urination, urinary urgency - denies frequency    Fever:  None    Flank pain:  No worse than baseline    Nausea/vomiting:  None    Falls:  None since leaving emergency department    4. RN Recommendations/Instructions per [facility] ED lab result protocol    [facility] ED lab result protocol used: Urine culture    Patient was notified of lab result and treatment recommendations    Advised to discontinue current antibiotic KEFLEX    Switch to Rx for Nitrofurantoin  Macrocrystal-Monohydrate (Macrobid) 100 mg PO capsule, 1 capsule (100 mg) by mouth 2 times daily for 5 days sent to [Pharmacy - [place], [place]].      RN reviewed information about UTI prevention & to return for any worsening symptoms of pyelonephritis including - fevers, new continuous flank pain, nausea/vomiting, severe weakness/falls/feeling faint - all questions answered patient verbalized understanding and agrees with plan.    Patient Education on preventing future UTI's.  1. Practice good personal hygiene. Wipe yourself from front to back after using the toilet. This helps keep bacteria from getting into the urethra. Keep the genital area clean and dry.  2. Drink plenty of fluids, such as water, juice, or other caffeine-free drinks.  Drink at least 6-8 glasses a day (1 1/2 to 2 1/2 liters), unless you must restrict fluids for other medical reasons. This will force the medicine (antibiotic) into your urinary system and flush the bacteria out of your body. Cranberry juice has been shown to help clear out the bacteria.  3. Empty your bladder. Always empty your bladder when you feel the urge to urinate. And always urinate before going to sleep. Urine that stays in your bladder can lead to infection. Try to urinate before and after sex as well.  4. Wear cotton underwear and cotton-lined panty hose; avoid tight-fitting pants.  5. Follow up with your health care provider as directed. He or she may test to make sure the infection has cleared. If necessary, additional treatment may be started.      5. Please Contact your PCP clinic or return to the Emergency department if your:    Symptoms return.    Symptoms do not improve after 3 days on antibiotic.    Symptoms do not resolve after completing antibiotic.    Symptoms worsen or other concerning symptoms.    Information summary from Emergency Dept/Urgent Care visit on 5/10/23  Symptoms reported at ED visit (Chief complaint, HPI) Chief Complaint:  Generalized  Weakness        The history is provided by the patient.      Nena Tang is a 63 year old female with a history of CAD, elevated BMI and pneumonia who presents with frequent falls. Patient reports she was walking to her psychiatrists office and began to feel her legs become weak, resulting in a fall. She has been too weak to get herself up on her own, resulting in her presentation to the ED. She has a walker and a cane, but has not been using either. She has seen PT before, but has not been keeping up on her exercises. Her last PT visit was 8-9 months ago. Denies any injuries.    Significant Medical hx, if applicable (i.e. CKD, diabetes) Chronic low back pain, Fibromyalgia, DMII   Allergies Allergies   Allergen Reactions     Imidazole Antifungals Hives     Tolerates diflucan     Ketoprofen Itching     Pruritis to topical     Lisinopril Hives     Metformin Other (See Comments)     Patient hospitalized for lactic acidosis - admitting provider suspectd caused by metformin     Metronidazole Hives     Posaconazole Hives     Tolerates diflucan      Weight, if applicable Wt Readings from Last 2 Encounters:   03/14/23 113.9 kg (251 lb)   12/12/22 114.8 kg (253 lb)      Coumadin/Warfarin [Yes /No] NO   Creatinine Level (mg/dl) Creatinine   Date Value Ref Range Status   05/10/2023 1.44 (H) 0.51 - 0.95 mg/dL Final   06/15/2021 0.78 0.52 - 1.04 mg/dL Final      Creatinine clearance (ml/min), if applicable Serum creatinine: 1.44 mg/dL (H) 05/10/23 1709  Estimated creatinine clearance: 47.6 mL/min (A)   Pregnant (Yes/No/NA) NA   Breastfeeding (Yes/No/NA) NA   ED providers Impression and Plan (applicable information) Medical Decision Making:  This complicated elderly woman with multiple medical problems presents to us because of weakness today.  Patient notes that she spent some time in a rehab center 6 to 8 months ago.  She notes that she felt much stronger when she returned to her group home.  However, she freely admits  that she has walked less and less and has been doing few of her PT exercises.  Today, she had an appointment with her psychiatrist, and had to walk quite a bit further than typical.  She knows that she is supposed to use a walker, but elected not to use a walker or a cane today.  While trying to get into the office and  the elevator, her leg started to shake and she was unable to make it completely to a chair.  However, the  did help her to the ground and she did not suffer any serious injury.  She is complaining of some right-sided hip and back pain, though x-rays are negative.     A broad differential was considered.  Laboratory investigation is all reassuring.  I do not believe that this is related to an acute cardiac or pulmonary event.  She is feeling improved after resting, and I personally ambulated her down the harden with her walker which she was able to do without any assistance.  At this point, I do feel that she is safe for discharge home and this is her desire as well.  Urinalysis was obtained late in her course which does show evidence of an infection and she will be treated with Keflex.  Otherwise, she does require some physical therapy.  I have spoken with my  who will speak with the patient and group home about figuring out plans for outpatient physical therapy.  Otherwise, the son's questions were answered as are her friends and all parties are comfortable with the plan for discharge back to her group home.     Diagnosis:      ICD-10-CM     1. Acute cystitis without hematuria  N30.00         2. Generalized muscle weakness  M62.81         3. Hip pain, right  M25.551         4. Fall, initial encounter  W19.XXXA               Discharge Medications:      Discharge Medication List as of 5/10/2023 11:43 PM           START taking these medications     Details   cephALEXin (KEFLEX) 500 MG capsule Take 1 capsule (500 mg) by mouth 2 times daily for 5 days, Disp-10 capsule, R-0,  Local Print              Scribe Disclosure:  I, DOMINIK MACEDO, am serving as a scribe at 11:27 PM on 5/10/2023 to document services personally performed by Trierweiler, Chad A, MD based on my observations and the provider's statements to me.   5/10/2023   Trierweiler, Chad A, MD Trierweiler, Chad A, MD  05/11/23 1620     ED diagnosis  Acute cystitis without hematuria  Generalized muscle weakness  Hip pain, right  Fall, initial encounter  Falls frequently   ED provider  Trierweiler, Chad A, MD        Copy of Lab report (if applicable)        Gaye Umana RN  [facility]  Emergency Dept Lab Result RN  Ph# [phone]

## 2023-05-15 NOTE — TELEPHONE ENCOUNTER
has made x 3 attempts to get ahold of group home staff - brooker also has contacted patient x 2 to discuss results and request to speak with group home staff - she reports she doesn't know if she has switched over to her new antibiotic - she is advised she needs rx change, and we are looking to speak with staff regarding infection and treatment recommendations - no staff available when speaking with patient x 2 - gave patient number and advised staff need to call ED results team back regarding her lab results and treatment recommendations.    Additionally  attempted call to Caitlin Lloyd - consent on file advising voice message - we have exhausted our attempts at contacting group home staff so we are reaching out to family to give update on patient labs and recommendations - Left voicemail message requesting a call back to [facility] ED Lab Result RN at [phone].  RN is available every day between 9 a.m. and 5:30 p.m.  See Telephone encounter.

## 2023-05-19 ENCOUNTER — APPOINTMENT (OUTPATIENT)
Dept: GENERAL RADIOLOGY | Facility: CLINIC | Age: 63
End: 2023-05-19
Attending: EMERGENCY MEDICINE
Payer: COMMERCIAL

## 2023-05-19 ENCOUNTER — HOSPITAL ENCOUNTER (EMERGENCY)
Facility: CLINIC | Age: 63
Discharge: NURSING, REHAB OR LONG-TERM CARE | End: 2023-05-19
Attending: EMERGENCY MEDICINE | Admitting: EMERGENCY MEDICINE
Payer: COMMERCIAL

## 2023-05-19 VITALS
BODY MASS INDEX: 43.19 KG/M2 | DIASTOLIC BLOOD PRESSURE: 56 MMHG | HEART RATE: 79 BPM | TEMPERATURE: 98.5 F | HEIGHT: 60 IN | SYSTOLIC BLOOD PRESSURE: 135 MMHG | WEIGHT: 220 LBS | OXYGEN SATURATION: 96 %

## 2023-05-19 DIAGNOSIS — M25.551 HIP PAIN, RIGHT: ICD-10-CM

## 2023-05-19 DIAGNOSIS — E86.0 DEHYDRATION: ICD-10-CM

## 2023-05-19 DIAGNOSIS — R79.89 ELEVATED SERUM CREATININE: ICD-10-CM

## 2023-05-19 LAB
ALBUMIN SERPL BCG-MCNC: 3.8 G/DL (ref 3.5–5.2)
ALP SERPL-CCNC: 63 U/L (ref 35–104)
ALT SERPL W P-5'-P-CCNC: 33 U/L (ref 10–35)
ANION GAP SERPL CALCULATED.3IONS-SCNC: 14 MMOL/L (ref 7–15)
ANION GAP SERPL CALCULATED.3IONS-SCNC: 15 MMOL/L (ref 7–15)
AST SERPL W P-5'-P-CCNC: 28 U/L (ref 10–35)
BASOPHILS # BLD AUTO: 0 10E3/UL (ref 0–0.2)
BASOPHILS NFR BLD AUTO: 0 %
BILIRUB SERPL-MCNC: 0.4 MG/DL
BUN SERPL-MCNC: 33.3 MG/DL (ref 8–23)
BUN SERPL-MCNC: 33.9 MG/DL (ref 8–23)
CALCIUM SERPL-MCNC: 8.8 MG/DL (ref 8.8–10.2)
CALCIUM SERPL-MCNC: 8.8 MG/DL (ref 8.8–10.2)
CHLORIDE SERPL-SCNC: 105 MMOL/L (ref 98–107)
CHLORIDE SERPL-SCNC: 106 MMOL/L (ref 98–107)
CREAT SERPL-MCNC: 2.09 MG/DL (ref 0.51–0.95)
CREAT SERPL-MCNC: 2.19 MG/DL (ref 0.51–0.95)
DEPRECATED HCO3 PLAS-SCNC: 16 MMOL/L (ref 22–29)
DEPRECATED HCO3 PLAS-SCNC: 16 MMOL/L (ref 22–29)
EOSINOPHIL # BLD AUTO: 0.2 10E3/UL (ref 0–0.7)
EOSINOPHIL NFR BLD AUTO: 2 %
ERYTHROCYTE [DISTWIDTH] IN BLOOD BY AUTOMATED COUNT: 12.8 % (ref 10–15)
GFR SERPL CREATININE-BSD FRML MDRD: 25 ML/MIN/1.73M2
GFR SERPL CREATININE-BSD FRML MDRD: 26 ML/MIN/1.73M2
GLUCOSE SERPL-MCNC: 159 MG/DL (ref 70–99)
GLUCOSE SERPL-MCNC: 180 MG/DL (ref 70–99)
HCT VFR BLD AUTO: 29.3 % (ref 35–47)
HGB BLD-MCNC: 9.2 G/DL (ref 11.7–15.7)
IMM GRANULOCYTES # BLD: 0.1 10E3/UL
IMM GRANULOCYTES NFR BLD: 1 %
LACTATE SERPL-SCNC: 1.3 MMOL/L (ref 0.7–2)
LYMPHOCYTES # BLD AUTO: 0.8 10E3/UL (ref 0.8–5.3)
LYMPHOCYTES NFR BLD AUTO: 7 %
MCH RBC QN AUTO: 32.1 PG (ref 26.5–33)
MCHC RBC AUTO-ENTMCNC: 31.4 G/DL (ref 31.5–36.5)
MCV RBC AUTO: 102 FL (ref 78–100)
MONOCYTES # BLD AUTO: 0.7 10E3/UL (ref 0–1.3)
MONOCYTES NFR BLD AUTO: 6 %
NEUTROPHILS # BLD AUTO: 9.9 10E3/UL (ref 1.6–8.3)
NEUTROPHILS NFR BLD AUTO: 84 %
NRBC # BLD AUTO: 0 10E3/UL
NRBC BLD AUTO-RTO: 0 /100
PLATELET # BLD AUTO: 148 10E3/UL (ref 150–450)
POTASSIUM SERPL-SCNC: 4.1 MMOL/L (ref 3.4–5.3)
POTASSIUM SERPL-SCNC: 4.1 MMOL/L (ref 3.4–5.3)
PROT SERPL-MCNC: 6.8 G/DL (ref 6.4–8.3)
RBC # BLD AUTO: 2.87 10E6/UL (ref 3.8–5.2)
SODIUM SERPL-SCNC: 136 MMOL/L (ref 136–145)
SODIUM SERPL-SCNC: 136 MMOL/L (ref 136–145)
WBC # BLD AUTO: 11.6 10E3/UL (ref 4–11)

## 2023-05-19 PROCEDURE — 87086 URINE CULTURE/COLONY COUNT: CPT | Performed by: EMERGENCY MEDICINE

## 2023-05-19 PROCEDURE — 36415 COLL VENOUS BLD VENIPUNCTURE: CPT | Performed by: EMERGENCY MEDICINE

## 2023-05-19 PROCEDURE — 96361 HYDRATE IV INFUSION ADD-ON: CPT

## 2023-05-19 PROCEDURE — 83605 ASSAY OF LACTIC ACID: CPT | Performed by: EMERGENCY MEDICINE

## 2023-05-19 PROCEDURE — 96360 HYDRATION IV INFUSION INIT: CPT

## 2023-05-19 PROCEDURE — 82310 ASSAY OF CALCIUM: CPT | Performed by: EMERGENCY MEDICINE

## 2023-05-19 PROCEDURE — 80053 COMPREHEN METABOLIC PANEL: CPT | Performed by: EMERGENCY MEDICINE

## 2023-05-19 PROCEDURE — 258N000003 HC RX IP 258 OP 636: Performed by: EMERGENCY MEDICINE

## 2023-05-19 PROCEDURE — 73502 X-RAY EXAM HIP UNI 2-3 VIEWS: CPT

## 2023-05-19 PROCEDURE — 99284 EMERGENCY DEPT VISIT MOD MDM: CPT | Mod: 25

## 2023-05-19 PROCEDURE — 85025 COMPLETE CBC W/AUTO DIFF WBC: CPT | Performed by: EMERGENCY MEDICINE

## 2023-05-19 RX ADMIN — SODIUM CHLORIDE 1000 ML: 9 INJECTION, SOLUTION INTRAVENOUS at 10:33

## 2023-05-19 ASSESSMENT — ACTIVITIES OF DAILY LIVING (ADL)
ADLS_ACUITY_SCORE: 37

## 2023-05-19 ASSESSMENT — ENCOUNTER SYMPTOMS
ABDOMINAL PAIN: 0
DYSURIA: 1
WEAKNESS: 1

## 2023-05-19 NOTE — ED NOTES
Bed: ED14  Expected date:   Expected time:   Means of arrival:   Comments:  [facility] [address] 63 F poss kidney infection UTI ETA 0845

## 2023-05-19 NOTE — ED TRIAGE NOTES
Patient brought in with right hip pain. She had a fall a week ago. She also has a UTI and is being treated with antibiotics.Per EMS BG was 170. EMS gave Toradol and IV fluids.     Triage Assessment     Row Name 05/19/23 0901       Triage Assessment (Adult)    Airway WDL WDL       Respiratory WDL    Respiratory WDL WDL       Skin Circulation/Temperature WDL    Skin Circulation/Temperature WDL WDL       Cardiac WDL    Cardiac WDL WDL       Peripheral/Neurovascular WDL    Peripheral Neurovascular WDL WDL       Cognitive/Neuro/Behavioral WDL    Cognitive/Neuro/Behavioral WDL WDL

## 2023-05-19 NOTE — ED PROVIDER NOTES
"  History     Chief Complaint:  Hip Pain    The history is provided by the patient.      Nena Tang is a 63 year old female with a history of CAD, type 2 diabetes, and hypertension who presents with EMS alone for hip pain. A week ago, patient had a fall and now has right hip pain. Three days ago, she developed a UTI, which she was given antibiotics for. Today while attempting to walk, she felt increased weakness and \"pain in her kidney\". Also, endorsing dysuria too. Nonetheless, she denies having any abdominal pain.     Independent Historian:   None - Patient Only    Review of External Notes: None.   Urine Culture  Order: [id number]   Collected 5/10/2023 11:24 PM        Culture >100,000 CFU/mL Citrobacter freundii complex Abnormal             Resulting Agency: [facility]     Susceptibility     Citrobacter freundii complex     CINTHIA     Ampicillin Resistant 1     Ampicillin/ Sulbactam Resistant 1     Cefazolin Resistant 2     Cefepime Susceptible     Cefoxitin Resistant 1     Ceftazidime Susceptible     Ceftriaxone Susceptible     Ciprofloxacin Susceptible     Gentamicin Susceptible     Levofloxacin Susceptible     Nitrofurantoin Susceptible     Piperacillin/Tazobactam Susceptible     Tobramycin Susceptible     Trimethoprim/Sulfamethoxazole Susceptible              1 Intrinsically Resistant   2 Cefazolin CINTHIA breakpoints are for the treatment of uncomplicated urinary tract infections. For the treatment of systemic infections, please contact the laboratory for additional testing.   Intrinsically Resistant            Specimen Collected: 05/10/23 11:24 PM Last Resulted: 05/12/23  9:16 PM             ROS:  Review of Systems   Gastrointestinal: Negative for abdominal pain.   Genitourinary: Positive for dysuria.   Musculoskeletal:        (+) Hip Pain   Neurological: Positive for weakness.   All other systems reviewed and are negative.    Allergies:  Imidazole " Antifungals  Ketoprofen  Lisinopril  Metformin  Metronidazole  Posaconazole     Medications:   Macrobid for current UTI.     Aspirin  alendronate  amantadine   atorvastatin  calcium carbonate   clonazePA  escitalopram  gabapentin  hydrOXYzine  insulin glargine   insulin lispro   insulin pen needle   losartan   magnesium 250 MG  metoprolol succinate   Myrbetriq 25 MG   nystatin  ondansetron  OneTouch Delica Lancets 33G  paliperidone  pantoprazole   QUEtiapine  senna-docusate  topiramate  traZODone    trulicity 3 MG/0.5ML   vitamin C     Past Medical History:    Acute respiratory failure with hypoxia   CAD   Chronic low back pain   Cocaine abuse, in remission    Fecal urgency   History of heroin abuse  Hyperlipidemia  Hypertension   Illiterate   Irritable bowel syndrome   Left cataract   Migraine   Moderate major depression   Noncompliance with medication regimen   Obesity   CINDY    Osteopenia   Pneumonia of right lower lobe due to infectious organism   Schizoaffective disorder, depressive type   Sepsis   Suicidal intent   Takotsubo cardiomyopathy   Type 2 diabetes mellitus   Uterine cancer  Verbal auditory hallucination     Past Surgical History:    Cataract iol, rt/lt  Cholecystectomy  colonoscopy  Coronary CTA  Hysterectomy (x2)  Laparoscopic cholecystectomy  Phacoemulsification clear cornea with standard intraocular lens implant (x2)   Release trigger finger (x2)     Family History:     Brother: Alcohol/Drug, Cancer,   Sister: Alcohol/Drug (x2), Mental Illness, Psychotic Disorder  Mother: Alcohol/Drug, CAD, Diabetes, Gastrointestinal Disease, Glaucoma, Hypertension, Lipids, Respiratory     Social History:  Patient presents alone.  Patient presents via EMS.  PCP: Albino Rainey     Physical Exam     Patient Vitals for the past 24 hrs:   BP Temp Temp src Pulse SpO2 Height Weight   05/19/23 0906 -- -- -- -- -- -- 99.8 kg (220 lb)   05/19/23 0848 122/43 98.1  F (36.7  C) Oral 98 94 % 1.524 m (5') --      Physical Exam   General: Alert, No distress. Nontoxic appearance. Tremulous.  Head: No signs of trauma.   Mouth/Throat: Oropharynx moist.   Eyes: Conjunctivae are normal. Pupils are equal..   Neck: Normal range of motion.    CV: Appears well perfused.  Abdominal: No firmness or tenderness in the abdomen with palpitation.   Resp:No respiratory distress.   MSK: Normal range of motion. No obvious deformity. No pain to range of hip. No pain with axial loading of the femur.  Neuro: The patient is alert and interactive. WHARTON. Speech normal. GCS 15  Skin: No lesion or sign of trauma noted.   Psych: normal mood and affect. behavior is normal.     Emergency Department Course     Imaging:  XR Pelvis and Hip Right 1 View   Final Result   IMPRESSION:      No acute fracture or dislocation, although somewhat limited due to   poor penetration/radiographic technique. Mild degenerative changes of   the hips, sacroiliac joints, and pubic symphysis. Vascular   calcifications..      ROSSI CLIFTON MD            SYSTEM ID:  [id number]         Report per radiology    Laboratory:  Labs Ordered and Resulted from Time of ED Arrival to Time of ED Departure   COMPREHENSIVE METABOLIC PANEL - Abnormal       Result Value    Sodium 136      Potassium 4.1      Chloride 105      Carbon Dioxide (CO2) 16 (*)     Anion Gap 15      Urea Nitrogen 33.9 (*)     Creatinine 2.19 (*)     Calcium 8.8      Glucose 180 (*)     Alkaline Phosphatase 63      AST 28      ALT 33      Protein Total 6.8      Albumin 3.8      Bilirubin Total 0.4      GFR Estimate 25 (*)    CBC WITH PLATELETS AND DIFFERENTIAL - Abnormal    WBC Count 11.6 (*)     RBC Count 2.87 (*)     Hemoglobin 9.2 (*)     Hematocrit 29.3 (*)      (*)     MCH 32.1      MCHC 31.4 (*)     RDW 12.8      Platelet Count 148 (*)     % Neutrophils 84      % Lymphocytes 7      % Monocytes 6      % Eosinophils 2      % Basophils 0      % Immature Granulocytes 1      NRBCs per 100 WBC 0      Absolute  Neutrophils 9.9 (*)     Absolute Lymphocytes 0.8      Absolute Monocytes 0.7      Absolute Eosinophils 0.2      Absolute Basophils 0.0      Absolute Immature Granulocytes 0.1      Absolute NRBCs 0.0     BASIC METABOLIC PANEL - Abnormal    Sodium 136      Potassium 4.1      Chloride 106      Carbon Dioxide (CO2) 16 (*)     Anion Gap 14      Urea Nitrogen 33.3 (*)     Creatinine 2.09 (*)     Calcium 8.8      Glucose 159 (*)     GFR Estimate 26 (*)    LACTIC ACID WHOLE BLOOD - Normal    Lactic Acid 1.3     URINE CULTURE     Emergency Department Course & Assessments:     Interventions:  Medications   0.9% sodium chloride BOLUS (0 mLs Intravenous Stopped 5/19/23 1207)     Assessments:  0850     I met with patient after reviewing notes, past charts, and vitals.  1155 I rechecked the patient and presented the findings.    Independent Interpretation (X-rays, CTs, rhythm strip):  I reviewed the scans and agree with Radiology.    Consultations/Discussion of Management or Tests:  None     Social Determinants of Health affecting care:   Healthcare Access/Compliance and Stress/Adjustment Disorders    Disposition:  The patient was discharged back to her assisted living facility.     Impression & Plan      CMS Diagnoses: None    Medical Decision Making:  This patient is presenting to the ED by EMS with complaints of right-sided pain.  Initial reports centered around pain mostly in her right hip.  X-rays of the hip are negative for fracture.  Exam of the hip reveals no pain with range of motion or axial loading. The patient is able to ambulate with her walker.  I doubt septic joint.  The patient has been recently diagnosed with a urinary tract infection.  Her right-sided discomfort could be related to pyelonephritis.  Laboratory studies reveal a normal lactate and slightly elevated white blood cell count.  Previous urine culture shows bacteria that is sensitive to her current antibiotic Macrobid.  The patient does appear dehydrated  with an elevated serum creatinine that improved with fluid rehydration here in the ED.  She will have to have this followed to ensure return to a high normal creatinine.  Her abdominal exam is benign.  Imaging is not indicated.  Urine culture sent and is pending.    Diagnosis:    ICD-10-CM    1. Hip pain, right  M25.551       2. Elevated serum creatinine  R79.89       3. Dehydration  E86.0          Discharge Medications:  New Prescriptions    No medications on file      Scribe Disclosure:  Akilah HUSSEIN, am serving as a scribe at 9:24 AM on 5/19/2023 to document services personally performed by Eran Torres MD based on my observations and the provider's statements to me.      5/19/2023   Eran Torres MD Joing, Todd Roger, MD  05/19/23 2150

## 2023-05-19 NOTE — PROGRESS NOTES
Called [facility] x2 (1300 and [phone]) to give handoff report. Left message for facility to call back for report and that transportation will be here at 1415. [facility] #[phone].

## 2023-05-21 LAB — BACTERIA UR CULT: NORMAL

## 2023-05-23 ENCOUNTER — OFFICE VISIT (OUTPATIENT)
Dept: PHARMACY | Facility: CLINIC | Age: 63
End: 2023-05-23
Payer: COMMERCIAL

## 2023-05-23 ENCOUNTER — OFFICE VISIT (OUTPATIENT)
Dept: FAMILY MEDICINE | Facility: CLINIC | Age: 63
End: 2023-05-23
Payer: COMMERCIAL

## 2023-05-23 VITALS
RESPIRATION RATE: 17 BRPM | SYSTOLIC BLOOD PRESSURE: 109 MMHG | TEMPERATURE: 97.6 F | HEART RATE: 68 BPM | HEIGHT: 60 IN | OXYGEN SATURATION: 96 % | BODY MASS INDEX: 48.88 KG/M2 | WEIGHT: 249 LBS | DIASTOLIC BLOOD PRESSURE: 65 MMHG
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
## 2023-05-23 DIAGNOSIS — N18.31 STAGE 3A CHRONIC KIDNEY DISEASE (H): ICD-10-CM

## 2023-05-23 DIAGNOSIS — M25.562 CHRONIC PAIN OF LEFT KNEE: ICD-10-CM

## 2023-05-23 DIAGNOSIS — R52 PAIN: Primary | ICD-10-CM

## 2023-05-23 DIAGNOSIS — F32.A DEPRESSION WITH SUICIDAL IDEATION: ICD-10-CM

## 2023-05-23 DIAGNOSIS — G89.29 CHRONIC PAIN OF LEFT KNEE: ICD-10-CM

## 2023-05-23 DIAGNOSIS — Z79.4 TYPE 2 DIABETES MELLITUS WITH MILD NONPROLIFERATIVE RETINOPATHY WITHOUT MACULAR EDEMA, WITH LONG-TERM CURRENT USE OF INSULIN, UNSPECIFIED LATERALITY (H): Primary | ICD-10-CM

## 2023-05-23 DIAGNOSIS — E66.813 CLASS 3 SEVERE OBESITY WITH SERIOUS COMORBIDITY AND BODY MASS INDEX (BMI) OF 45.0 TO 49.9 IN ADULT, UNSPECIFIED OBESITY TYPE (H): ICD-10-CM

## 2023-05-23 DIAGNOSIS — E11.3299 TYPE 2 DIABETES MELLITUS WITH MILD NONPROLIFERATIVE RETINOPATHY WITHOUT MACULAR EDEMA, WITH LONG-TERM CURRENT USE OF INSULIN, UNSPECIFIED LATERALITY (H): ICD-10-CM

## 2023-05-23 DIAGNOSIS — M79.7 FIBROMYALGIA: ICD-10-CM

## 2023-05-23 DIAGNOSIS — R45.851 DEPRESSION WITH SUICIDAL IDEATION: ICD-10-CM

## 2023-05-23 DIAGNOSIS — Z79.4 TYPE 2 DIABETES MELLITUS WITH MILD NONPROLIFERATIVE RETINOPATHY WITHOUT MACULAR EDEMA, WITH LONG-TERM CURRENT USE OF INSULIN, UNSPECIFIED LATERALITY (H): ICD-10-CM

## 2023-05-23 DIAGNOSIS — E11.3299 TYPE 2 DIABETES MELLITUS WITH MILD NONPROLIFERATIVE RETINOPATHY WITHOUT MACULAR EDEMA, WITH LONG-TERM CURRENT USE OF INSULIN, UNSPECIFIED LATERALITY (H): Primary | ICD-10-CM

## 2023-05-23 DIAGNOSIS — F25.1 SCHIZOAFFECTIVE DISORDER, DEPRESSIVE TYPE (H): ICD-10-CM

## 2023-05-23 DIAGNOSIS — F25.0 SCHIZOAFFECTIVE DISORDER, BIPOLAR TYPE (H): ICD-10-CM

## 2023-05-23 DIAGNOSIS — R06.02 SOB (SHORTNESS OF BREATH): ICD-10-CM

## 2023-05-23 DIAGNOSIS — M51.379 DDD (DEGENERATIVE DISC DISEASE), LUMBOSACRAL: ICD-10-CM

## 2023-05-23 DIAGNOSIS — E66.01 CLASS 3 SEVERE OBESITY WITH SERIOUS COMORBIDITY AND BODY MASS INDEX (BMI) OF 45.0 TO 49.9 IN ADULT, UNSPECIFIED OBESITY TYPE (H): ICD-10-CM

## 2023-05-23 DIAGNOSIS — K21.9 GASTROESOPHAGEAL REFLUX DISEASE WITHOUT ESOPHAGITIS: ICD-10-CM

## 2023-05-23 DIAGNOSIS — I10 HTN, GOAL BELOW 140/90: ICD-10-CM

## 2023-05-23 DIAGNOSIS — M79.10 MYALGIA: ICD-10-CM

## 2023-05-23 DIAGNOSIS — I25.119 CORONARY ARTERY DISEASE INVOLVING NATIVE HEART WITH ANGINA PECTORIS, UNSPECIFIED VESSEL OR LESION TYPE (H): ICD-10-CM

## 2023-05-23 LAB
ALBUMIN SERPL BCG-MCNC: 3.8 G/DL (ref 3.5–5.2)
ALBUMIN UR-MCNC: NEGATIVE MG/DL
ALP SERPL-CCNC: 76 U/L (ref 35–104)
ALT SERPL W P-5'-P-CCNC: 39 U/L (ref 10–35)
ANION GAP SERPL CALCULATED.3IONS-SCNC: 14 MMOL/L (ref 7–15)
APPEARANCE UR: CLEAR
AST SERPL W P-5'-P-CCNC: 18 U/L (ref 10–35)
BACTERIA #/AREA URNS HPF: ABNORMAL /HPF
BASOPHILS # BLD AUTO: 0 10E3/UL (ref 0–0.2)
BASOPHILS NFR BLD AUTO: 0 %
BILIRUB SERPL-MCNC: 0.3 MG/DL
BILIRUB UR QL STRIP: NEGATIVE
BUN SERPL-MCNC: 27.2 MG/DL (ref 8–23)
CALCIUM SERPL-MCNC: 9.5 MG/DL (ref 8.8–10.2)
CHLORIDE SERPL-SCNC: 109 MMOL/L (ref 98–107)
COLOR UR AUTO: YELLOW
CREAT SERPL-MCNC: 1.59 MG/DL (ref 0.51–0.95)
CRP SERPL-MCNC: 99.1 MG/L
DEPRECATED HCO3 PLAS-SCNC: 19 MMOL/L (ref 22–29)
EOSINOPHIL # BLD AUTO: 0.3 10E3/UL (ref 0–0.7)
EOSINOPHIL NFR BLD AUTO: 5 %
ERYTHROCYTE [DISTWIDTH] IN BLOOD BY AUTOMATED COUNT: 12.9 % (ref 10–15)
ERYTHROCYTE [SEDIMENTATION RATE] IN BLOOD BY WESTERGREN METHOD: 73 MM/HR (ref 0–30)
FERRITIN SERPL-MCNC: 344 NG/ML (ref 11–328)
GFR SERPL CREATININE-BSD FRML MDRD: 36 ML/MIN/1.73M2
GLUCOSE SERPL-MCNC: 181 MG/DL (ref 70–99)
GLUCOSE UR STRIP-MCNC: NEGATIVE MG/DL
HCT VFR BLD AUTO: 28.7 % (ref 35–47)
HGB BLD-MCNC: 9.1 G/DL (ref 11.7–15.7)
HGB UR QL STRIP: NEGATIVE
IMM GRANULOCYTES # BLD: 0.2 10E3/UL
IMM GRANULOCYTES NFR BLD: 3 %
KETONES UR STRIP-MCNC: NEGATIVE MG/DL
LEUKOCYTE ESTERASE UR QL STRIP: ABNORMAL
LYMPHOCYTES # BLD AUTO: 1.8 10E3/UL (ref 0.8–5.3)
LYMPHOCYTES NFR BLD AUTO: 27 %
MCH RBC QN AUTO: 32.6 PG (ref 26.5–33)
MCHC RBC AUTO-ENTMCNC: 31.7 G/DL (ref 31.5–36.5)
MCV RBC AUTO: 103 FL (ref 78–100)
MONOCYTES # BLD AUTO: 0.6 10E3/UL (ref 0–1.3)
MONOCYTES NFR BLD AUTO: 9 %
NEUTROPHILS # BLD AUTO: 3.8 10E3/UL (ref 1.6–8.3)
NEUTROPHILS NFR BLD AUTO: 57 %
NITRATE UR QL: NEGATIVE
NT-PROBNP SERPL-MCNC: 507 PG/ML (ref 0–900)
PH UR STRIP: 5 [PH] (ref 5–7)
PLATELET # BLD AUTO: 206 10E3/UL (ref 150–450)
POTASSIUM SERPL-SCNC: 4 MMOL/L (ref 3.4–5.3)
PROT SERPL-MCNC: 6.8 G/DL (ref 6.4–8.3)
RBC # BLD AUTO: 2.79 10E6/UL (ref 3.8–5.2)
RBC #/AREA URNS AUTO: ABNORMAL /HPF
SODIUM SERPL-SCNC: 142 MMOL/L (ref 136–145)
SP GR UR STRIP: 1.02 (ref 1–1.03)
SQUAMOUS #/AREA URNS AUTO: ABNORMAL /LPF
UROBILINOGEN UR STRIP-ACNC: 0.2 E.U./DL
WBC # BLD AUTO: 6.7 10E3/UL (ref 4–11)
WBC #/AREA URNS AUTO: ABNORMAL /HPF

## 2023-05-23 PROCEDURE — 36415 COLL VENOUS BLD VENIPUNCTURE: CPT | Performed by: FAMILY MEDICINE

## 2023-05-23 PROCEDURE — 82728 ASSAY OF FERRITIN: CPT | Performed by: FAMILY MEDICINE

## 2023-05-23 PROCEDURE — 87086 URINE CULTURE/COLONY COUNT: CPT | Performed by: FAMILY MEDICINE

## 2023-05-23 PROCEDURE — 85652 RBC SED RATE AUTOMATED: CPT | Performed by: FAMILY MEDICINE

## 2023-05-23 PROCEDURE — 86140 C-REACTIVE PROTEIN: CPT | Performed by: FAMILY MEDICINE

## 2023-05-23 PROCEDURE — 99606 MTMS BY PHARM EST 15 MIN: CPT | Performed by: PHARMACIST

## 2023-05-23 PROCEDURE — 99214 OFFICE O/P EST MOD 30 MIN: CPT | Performed by: FAMILY MEDICINE

## 2023-05-23 PROCEDURE — 81001 URINALYSIS AUTO W/SCOPE: CPT | Performed by: FAMILY MEDICINE

## 2023-05-23 PROCEDURE — 82550 ASSAY OF CK (CPK): CPT | Performed by: FAMILY MEDICINE

## 2023-05-23 PROCEDURE — 99607 MTMS BY PHARM ADDL 15 MIN: CPT | Performed by: PHARMACIST

## 2023-05-23 PROCEDURE — 85025 COMPLETE CBC W/AUTO DIFF WBC: CPT | Performed by: FAMILY MEDICINE

## 2023-05-23 PROCEDURE — 83880 ASSAY OF NATRIURETIC PEPTIDE: CPT | Performed by: FAMILY MEDICINE

## 2023-05-23 PROCEDURE — 80053 COMPREHEN METABOLIC PANEL: CPT | Performed by: FAMILY MEDICINE

## 2023-05-23 RX ORDER — ASPIRIN 81 MG/1
81 TABLET ORAL DAILY
Qty: 28 TABLET | Refills: 11 | Status: SHIPPED | OUTPATIENT
Start: 2023-05-23 | End: 2024-04-24

## 2023-05-23 RX ORDER — CLONAZEPAM 0.5 MG/1
0.5 TABLET ORAL
Qty: 28 TABLET | Refills: 5 | Status: SHIPPED | OUTPATIENT
Start: 2023-05-23 | End: 2023-07-03

## 2023-05-23 RX ORDER — METOPROLOL SUCCINATE 50 MG/1
TABLET, EXTENDED RELEASE ORAL
Qty: 84 TABLET | Refills: 11 | Status: SHIPPED | OUTPATIENT
Start: 2023-05-23 | End: 2024-04-24

## 2023-05-23 RX ORDER — PRAZOSIN HYDROCHLORIDE 1 MG/1
1 CAPSULE ORAL AT BEDTIME
Status: ON HOLD | COMMUNITY
Start: 2023-05-01 | End: 2023-09-20

## 2023-05-23 RX ORDER — PANTOPRAZOLE SODIUM 40 MG/1
40 TABLET, DELAYED RELEASE ORAL DAILY
Qty: 28 TABLET | Refills: 11 | Status: SHIPPED | OUTPATIENT
Start: 2023-05-23 | End: 2024-04-24

## 2023-05-23 RX ORDER — TOPIRAMATE 50 MG/1
TABLET, FILM COATED ORAL
Qty: 42 TABLET | Refills: 0 | Status: SHIPPED | OUTPATIENT
Start: 2023-05-23 | End: 2023-06-20

## 2023-05-23 RX ORDER — ESCITALOPRAM OXALATE 20 MG/1
TABLET ORAL
Qty: 14 TABLET | Refills: 11 | Status: ON HOLD | OUTPATIENT
Start: 2023-05-23 | End: 2023-09-26

## 2023-05-23 ASSESSMENT — ANXIETY QUESTIONNAIRES
GAD7 TOTAL SCORE: 12
GAD7 TOTAL SCORE: 12
3. WORRYING TOO MUCH ABOUT DIFFERENT THINGS: MORE THAN HALF THE DAYS
2. NOT BEING ABLE TO STOP OR CONTROL WORRYING: MORE THAN HALF THE DAYS
4. TROUBLE RELAXING: MORE THAN HALF THE DAYS
7. FEELING AFRAID AS IF SOMETHING AWFUL MIGHT HAPPEN: NOT AT ALL
7. FEELING AFRAID AS IF SOMETHING AWFUL MIGHT HAPPEN: NOT AT ALL
GAD7 TOTAL SCORE: 12
5. BEING SO RESTLESS THAT IT IS HARD TO SIT STILL: MORE THAN HALF THE DAYS
6. BECOMING EASILY ANNOYED OR IRRITABLE: MORE THAN HALF THE DAYS
1. FEELING NERVOUS, ANXIOUS, OR ON EDGE: MORE THAN HALF THE DAYS
IF YOU CHECKED OFF ANY PROBLEMS ON THIS QUESTIONNAIRE, HOW DIFFICULT HAVE THESE PROBLEMS MADE IT FOR YOU TO DO YOUR WORK, TAKE CARE OF THINGS AT HOME, OR GET ALONG WITH OTHER PEOPLE: SOMEWHAT DIFFICULT
8. IF YOU CHECKED OFF ANY PROBLEMS, HOW DIFFICULT HAVE THESE MADE IT FOR YOU TO DO YOUR WORK, TAKE CARE OF THINGS AT HOME, OR GET ALONG WITH OTHER PEOPLE?: SOMEWHAT DIFFICULT

## 2023-05-23 ASSESSMENT — PAIN SCALES - GENERAL: PAINLEVEL: SEVERE PAIN (6)

## 2023-05-23 ASSESSMENT — PATIENT HEALTH QUESTIONNAIRE - PHQ9
10. IF YOU CHECKED OFF ANY PROBLEMS, HOW DIFFICULT HAVE THESE PROBLEMS MADE IT FOR YOU TO DO YOUR WORK, TAKE CARE OF THINGS AT HOME, OR GET ALONG WITH OTHER PEOPLE: SOMEWHAT DIFFICULT
SUM OF ALL RESPONSES TO PHQ QUESTIONS 1-9: 21
SUM OF ALL RESPONSES TO PHQ QUESTIONS 1-9: 21

## 2023-05-23 NOTE — PROGRESS NOTES
Assessment & Plan     Type 2 diabetes mellitus with mild nonproliferative retinopathy without macular edema, with long-term current use of insulin, unspecified laterality (H)  Insulin was refilled today and dose was adjusted to reflect what she is currently using.  - insulin glargine (LANTUS PEN) 100 UNIT/ML pen; Inject 33 Units Subcutaneous 2 times daily    Fibromyalgia  We also reviewed her medication list and the concern arose that she may have some issues with polypharmacy.  Unclear how much the topiramate may actually be helping her at this time so we we will go ahead and taper off with a new prescription sent to the pharmacy.  - topiramate (TOPAMAX) 50 MG tablet; Take 3 tablets (150 mg) by mouth daily for 7 days, THEN 2 tablets (100 mg) daily for 7 days, THEN 1 tablet (50 mg) daily for 7 days. Then stop medication    Schizoaffective disorder, bipolar type (H)  Clonazepam refill was sent to the pharmacy today.  - clonazePAM (KLONOPIN) 0.5 MG tablet; Take 1 tablet (0.5 mg) by mouth nightly as needed for anxiety    DDD (degenerative disc disease), lumbosacral  Given her generalized achiness and discomfort as well as relatively new onset weakness I would like to check some inflammatory markers.  - ESR: Erythrocyte sedimentation rate; Future  - CRP, inflammation; Future  - ESR: Erythrocyte sedimentation rate  - CRP, inflammation    Chronic pain of left knee  She does have findings consistent with osteoarthritis involving both knees and referral was placed to orthopedics.  - Orthopedic  Referral; Future    Stage 3a chronic kidney disease (H)  Other laboratory testing will be checked as noted below to monitor her kidney disease.  Blood pressure is under good control today.  Follow-up urine testing because of recent urinary tract infections.  - Comprehensive metabolic panel (BMP + Alb, Alk Phos, ALT, AST, Total. Bili, TP); Future  - CBC with platelets and differential; Future  - Ferritin; Future  - UA with  Microscopic reflex to Culture - lab collect; Future  - Comprehensive metabolic panel (BMP + Alb, Alk Phos, ALT, AST, Total. Bili, TP)  - CBC with platelets and differential  - Ferritin  - UA with Microscopic reflex to Culture - lab collect  - UA Microscopic with Reflex to Culture  - Urine Culture    SOB (shortness of breath)  She does have some swelling in the lower extremities so I would like to check a BNP today.  - BNP-N terminal pro; Future  - BNP-N terminal pro    Myalgia  Check a CK due to recent weakness and disability.  - CK total           MED REC REQUIRED  Post Medication Reconciliation Status: discharge medications reconciled and changed, per note/ordersBlood sugar testing frequency justification:  Adjustment of medication(s)      Albino Rainey MD  [facility] 63 year [facility], presenting for the following health issues:  Diabetes and Hospital F/U        5/23/2023    11:07 AM   Additional Questions   Roomed by Mary Lanza   Accompanied by Praveena -      History of Present Illness       Mental Health Follow-up:  Patient presents to follow-up on Depression & Anxiety.Patient's depression since last visit has been:  Worse  The patient is not having other symptoms associated with depression.  Patient's anxiety since last visit has been:  Worse  The patient is not having other symptoms associated with anxiety.  Any significant life events: No  Patient is feeling anxious or having panic attacks.  Patient has no concerns about alcohol or drug use.    Diabetes:   She presents for follow up of diabetes.  She is checking home blood glucose four or more times daily. She checks blood glucose before and after meals and at bedtime.  Blood glucose is sometimes over 200 and sometimes under 70. She is aware of hypoglycemia symptoms including shakiness, dizziness, weakness, lethargy, blurred vision and confusion. She is concerned about frequent infections  "and blood sugar frequently over 200. She is having weight gain.         Reason for visit:  Uti hip pain weak legs    She eats 0-1 servings of fruits and vegetables daily.She consumes 2 sweetened beverage(s) daily.She exercises with enough effort to increase her heart rate 9 or less minutes per day.  She exercises with enough effort to increase her heart rate 3 or less days per week.   She is taking medications regularly.    Today's PHQ-9         PHQ-9 Total Score: 21    PHQ-9 Q9 Thoughts of better off dead/self-harm past 2 weeks :   Not at all    How difficult have these problems made it for you to do your work, take care of things at home, or get along with other people: Somewhat difficult  Today's TAMIA-7 Score: 12       ED/UC Followup:    Facility:  [facility]  Date of visit: 5/16/2023  Reason for visit: Hip Pain  Current Status: \"Worse\"      ED/UC Followup:    Facility:  [facility]  Date of visit: 5/10/23  Reason for visit: Weakness  Current Status: \"Worse\"    Patient is here today with her .  We also called and the nurse at the assisted living on the phone by speaker today.  The patient has had a couple recent emergency room visits for weakness, disability, and discomfort.  She is increasingly needing assistance for bathing and other ADLs, up to 2 people at a time.  She complains of discomfort and feels like her legs are giving out and has had increasing weakness recently that was attributed to a urinary tract infection.  She describes discomfort in the bilateral lower extremities and back seems to localize it more to her hips or knees.  Physical therapy has been helpful in the past.  She had some recent worsening of her kidney function.  Nena just notes increasing weakness and pain as her main concerns but has a difficult time describing it.  Onset would seem to be relatively abrupt given the recent emergency room visits.      Review " of Systems   Constitutional, HEENT, cardiovascular, pulmonary, gi and gu systems are negative, except as otherwise noted.      Objective    /65 (BP Location: Left arm, Patient Position: Sitting, Cuff Size: Adult Large)   Pulse 68   Temp 97.6  F (36.4  C) (Temporal)   Resp 17   Ht 1.524 m (5')   Wt 112.9 kg (249 lb)   LMP 01/06/2015 (Exact Date)   SpO2 96%   BMI 48.63 kg/m    Body mass index is 48.63 kg/m .  Physical Exam   GENERAL: healthy, alert and no distress  EYES: Eyes grossly normal to inspection, PERRL and conjunctivae and sclerae normal  HENT: ear canals and TM's normal, nose and mouth without ulcers or lesions  NECK: no adenopathy, no asymmetry, masses, or scars and thyroid normal to palpation  RESP: lungs clear to auscultation - no rales, rhonchi or wheezes  CV: regular rate and rhythm, normal S1 S2, no S3 or S4, no murmur, click or rub, no peripheral edema and peripheral pulses strong  MS: She has some pitting edema below the level of the knees, 1+ in severity.  She was able to rise from her chair independently.  She does have some diffuse tenderness both in the joints and with palpation of the muscles.  No obvious effusions are appreciated in the knees.  SKIN: no suspicious lesions or rashes  NEURO: Normal strength and tone, mentation intact and speech normal  PSYCH: mentation appears normal and affect flat    Results for orders placed or performed in visit on 05/23/23   Comprehensive metabolic panel (BMP + Alb, Alk Phos, ALT, AST, Total. Bili, TP)     Status: Abnormal   Result Value Ref Range    Sodium 142 136 - 145 mmol/L    Potassium 4.0 3.4 - 5.3 mmol/L    Chloride 109 (H) 98 - 107 mmol/L    Carbon Dioxide (CO2) 19 (L) 22 - 29 mmol/L    Anion Gap 14 7 - 15 mmol/L    Urea Nitrogen 27.2 (H) 8.0 - 23.0 mg/dL    Creatinine 1.59 (H) 0.51 - 0.95 mg/dL    Calcium 9.5 8.8 - 10.2 mg/dL    Glucose 181 (H) 70 - 99 mg/dL    Alkaline Phosphatase 76 35 - 104 U/L    AST 18 10 - 35 U/L    ALT 39 (H) 10  - 35 U/L    Protein Total 6.8 6.4 - 8.3 g/dL    Albumin 3.8 3.5 - 5.2 g/dL    Bilirubin Total 0.3 <=1.2 mg/dL    GFR Estimate 36 (L) >60 mL/min/1.73m2   Ferritin     Status: Abnormal   Result Value Ref Range    Ferritin 344 (H) 11 - 328 ng/mL   ESR: Erythrocyte sedimentation rate     Status: Abnormal   Result Value Ref Range    Erythrocyte Sedimentation Rate 73 (H) 0 - 30 mm/hr   CRP, inflammation     Status: Abnormal   Result Value Ref Range    CRP Inflammation 99.10 (H) <5.00 mg/L   UA with Microscopic reflex to Culture - lab collect     Status: Abnormal    Specimen: Urine, Midstream   Result Value Ref Range    Color Urine Yellow Colorless, Straw, Light Yellow, Yellow    Appearance Urine Clear Clear    Glucose Urine Negative Negative mg/dL    Bilirubin Urine Negative Negative    Ketones Urine Negative Negative mg/dL    Specific Gravity Urine 1.020 1.003 - 1.035    Blood Urine Negative Negative    pH Urine 5.0 5.0 - 7.0    Protein Albumin Urine Negative Negative mg/dL    Urobilinogen Urine 0.2 0.2, 1.0 E.U./dL    Nitrite Urine Negative Negative    Leukocyte Esterase Urine Small (A) Negative   BNP-N terminal pro     Status: Normal   Result Value Ref Range    N Terminal Pro BNP Outpatient 507 0 - 900 pg/mL   CBC with platelets and differential     Status: Abnormal   Result Value Ref Range    WBC Count 6.7 4.0 - 11.0 10e3/uL    RBC Count 2.79 (L) 3.80 - 5.20 10e6/uL    Hemoglobin 9.1 (L) 11.7 - 15.7 g/dL    Hematocrit 28.7 (L) 35.0 - 47.0 %     (H) 78 - 100 fL    MCH 32.6 26.5 - 33.0 pg    MCHC 31.7 31.5 - 36.5 g/dL    RDW 12.9 10.0 - 15.0 %    Platelet Count 206 150 - 450 10e3/uL    % Neutrophils 57 %    % Lymphocytes 27 %    % Monocytes 9 %    % Eosinophils 5 %    % Basophils 0 %    % Immature Granulocytes 3 %    Absolute Neutrophils 3.8 1.6 - 8.3 10e3/uL    Absolute Lymphocytes 1.8 0.8 - 5.3 10e3/uL    Absolute Monocytes 0.6 0.0 - 1.3 10e3/uL    Absolute Eosinophils 0.3 0.0 - 0.7 10e3/uL    Absolute  Basophils 0.0 0.0 - 0.2 10e3/uL    Absolute Immature Granulocytes 0.2 <=0.4 10e3/uL   UA Microscopic with Reflex to Culture     Status: Abnormal   Result Value Ref Range    Bacteria Urine Few (A) None Seen /HPF    RBC Urine 0-2 0-2 /HPF /HPF    WBC Urine 10-25 (A) 0-5 /HPF /HPF    Squamous Epithelials Urine Few (A) None Seen /LPF   CK total     Status: Normal   Result Value Ref Range     26 - 192 U/L   Urine Culture     Status: None    Specimen: Urine, Midstream   Result Value Ref Range    Culture 10,000-50,000 CFU/mL Mixture of urogenital timoteo    CBC with platelets and differential     Status: Abnormal    Narrative    The following orders were created for panel order CBC with platelets and differential.  Procedure                               Abnormality         Status                     ---------                               -----------         ------                     CBC with platelets and d...[[id number]]  Abnormal            Final result                 Please view results for these tests on the individual orders.

## 2023-05-23 NOTE — TELEPHONE ENCOUNTER
"Requested Prescriptions   Pending Prescriptions Disp Refills     ASPIRIN LOW DOSE 81 MG EC tablet [Pharmacy Med Name: Aspirin Low Dose 81MG TBEC] 28 tablet      Sig: TAKE 1 TABLET BY MOUTH DAILY       Analgesics (Non-Narcotic Tylenol and ASA Only) Passed - 5/23/2023 12:37 PM        Passed - Recent (12 mo) or future (30 days) visit within the authorizing provider's specialty     Patient has had an office visit with the authorizing provider or a provider within the authorizing providers department within the previous 12 mos or has a future within next 30 days. See \"Patient Info\" tab in inbasket, or \"Choose Columns\" in Meds & Orders section of the refill encounter.              Passed - Patient is age 20 years or older     If ASA is flagged for ages under 20 years old. Forward to provider for confirmation Ryes Syndrome is not a concern.              Passed - Medication is active on med list           metoprolol succinate ER (TOPROL XL) 50 MG 24 hr tablet [Pharmacy Med Name: Metoprolol Succinate ER 50MG TB24] 84 tablet 1     Sig: TAKE 1 TABLET BY MOUTH IN THE MORNING AND TAKE 2 TABLETS AT BEDTIME       Beta-Blockers Protocol Passed - 5/23/2023 12:37 PM        Passed - Blood pressure under 140/90 in past 12 months     BP Readings from Last 3 Encounters:   05/23/23 109/65   05/19/23 135/56   05/10/23 (!) 166/81                 Passed - Patient is age 6 or older        Passed - Recent (12 mo) or future (30 days) visit within the authorizing provider's specialty     Patient has had an office visit with the authorizing provider or a provider within the authorizing providers department within the previous 12 mos or has a future within next 30 days. See \"Patient Info\" tab in inbasket, or \"Choose Columns\" in Meds & Orders section of the refill encounter.              Passed - Medication is active on med list           pantoprazole (PROTONIX) 40 MG EC tablet [Pharmacy Med Name: Pantoprazole Sodium 40MG TBEC] 28 tablet 0     Sig: " "TAKE 1 TABLET (40 MG) BY MOUTH DAILY       PPI Protocol Passed - 5/23/2023 12:37 PM        Passed - Not on Clopidogrel (unless Pantoprazole ordered)        Passed - No diagnosis of osteoporosis on record        Passed - Recent (12 mo) or future (30 days) visit within the authorizing provider's specialty     Patient has had an office visit with the authorizing provider or a provider within the authorizing providers department within the previous 12 mos or has a future within next 30 days. See \"Patient Info\" tab in inbasket, or \"Choose Columns\" in Meds & Orders section of the refill encounter.              Passed - Medication is active on med list        Passed - Patient is age 18 or older        Passed - No active pregnacy on record        Passed - No positive pregnancy test in past 12 months           escitalopram (LEXAPRO) 20 MG tablet [Pharmacy Med Name: Escitalopram Oxalate 20MG TABS] 14 tablet      Sig: TAKE 1/2 TABLET BY MOUTH DAILY       SSRIs Protocol Failed - 5/23/2023 12:37 PM        Failed - PHQ-9 score less than 5 in past 6 months     Please review last PHQ-9 score.           Passed - Medication is active on med list        Passed - Patient is age 18 or older        Passed - No active pregnancy on record        Passed - No positive pregnancy test in last 12 months        Passed - Recent (6 mo) or future (30 days) visit within the authorizing provider's specialty     Patient had office visit in the last 6 months or has a visit in the next 30 days with authorizing provider or within the authorizing provider's specialty.  See \"Patient Info\" tab in inbasket, or \"Choose Columns\" in Meds & Orders section of the refill encounter.               Last PHQ9 score of 21 on 5/23/23.    Routing refill request to provider for review/approval because:  Drug interaction warning    Thanks!  Manjinder Cook RN   [facility]          "

## 2023-05-23 NOTE — PROGRESS NOTES
Medication Therapy Management (MTM) Encounter    ASSESSMENT:                            Medication Adherence/Access: No issues identified    Falls: more sedentary, referred for physical therapy and ortho to consider knee injections.    Hip, knee pain: unclear benefit of topiramate and concern with higher blood levels due to recent increase in creatinine. Discussed trial taper to reduce polypharmacy and she is agreeable. Caregivers will help with monitoring.     Weight: she may benefit from switching to tirzepetide (preferred) or semaglutide for additional weight loss, deferred change to consider at follow-up with prioritizing taper of topiramate.   Not recommended to use Golo supplement.     Schizoaffective disorder: patient does not report any acute concerns, followup with psychiatry.     Type 2 Diabetes: A1c at goal <8%. Hyperglycemia x1 week, recent infection, no medication change at this time and will monitor.     PLAN:                            Decrease topiramate by 50mg every week until stopped    Follow-up: 2-4 weeks    SUBJECTIVE/OBJECTIVE:                          Nena Tang is a 63 year old female coming in for a follow-up visit. Today's visit is a co-visit with Albino Rainey MD. Today's visit is a follow-up MTM visit from 3/14/23 Accompanied by group home staff Maurice Neves RN also called during visit to provide additional information.    Reason for visit: medication recheck.    Allergies/ADRs: Reviewed in chart  Past Medical History: Reviewed in chart  Tobacco: She reports that she has never smoked. She has never used smokeless tobacco.  Alcohol: not currently using      Medication Adherence/Access: no issues reported  Medications administered by group home staff.     Falls: reports increase in leg weakness and fall at home with inability to get up. States when she tried to get up, unable to get up off the floor and paramedics were called.  She has not been exercising at all. Not doing  physical therapy exercises for hip pain anymore.  Denies any lightheadedness with position change.  Helpful - physical therapy in the past    Hip,knee pain: hurts when standing/walking. No pain with sitting usually. Hurts on right side low back.   Takes acetaminophen 1000mg twice a day scheduled and gabapentin 300mg at bedtime. Continues to take topiramate 200mg daily which had been started years ago for fibromyalgia.   Creatinine   Date Value Ref Range Status   05/19/2023 2.09 (H) 0.51 - 0.95 mg/dL Final   06/15/2021 0.78 0.52 - 1.04 mg/dL Final     Weight: currently taking Trulicity 3mg every 7 days. Wondering if she should try Golo supplement. Would like to lose weight.  Wt Readings from Last 3 Encounters:   05/23/23 249 lb (112.9 kg)   05/19/23 220 lb (99.8 kg)   03/14/23 251 lb (113.9 kg)        Schizoaffective disorder: currently taking Invega 9mg at bedtime, quetiapine 100mg at bedtime (increasing dose per psychiatry), escitalopram 10mg daily, clonazepam 0.5mg at bedtime, trazodone 100mg at bedtime, prazosin 1mg at bedtime (new).   Did not discuss in detail today.   No side effects noted with adding prazosin.   Continues drop in center 3 times a week   Psychiatry - established care with outside clinic.       Type 2 Diabetes:  Currently taking Lantus 33 units twice daily, Humalog 8 units with meals +sliding scale 3u per 50mg/dL starting glucose 151 and max 23u. Trulicity 3 mg weekly. Side effects: constipation, manages with senna-docusate.  Blood sugar monitoring: Continuous Glucose Monitor. Ranges (glucometer)            Symptoms of low blood sugar? none  Symptoms of high blood sugar? Continues to complain of urinary frequency. Recent UTI.  Eye exam: up to date  Foot exam: up to date  Diet: not able to explain why glucose has been higher this past week. Would like to lose weight.  Aspirin: Taking 81mg daily.   Statin: Yes: Atorvastatin.   ACEi/ARB: Yes: Losartan.  Urine Albumin:   Lab Results   Component  Value Date    MICROL 11 08/17/2022    MICROL 7 09/15/2020     Lab Results   Component Value Date    A1C 6.5 03/14/2023    A1C 6.4 12/12/2022    A1C 7.4 08/17/2022    A1C 8.0 06/21/2022    A1C 7.3 03/24/2022    A1C 9.1 12/01/2020    A1C 9.7 09/15/2020    A1C 8.6 06/30/2020    A1C 6.2 12/03/2019    A1C 6.6 08/06/2019        Today's Vitals: LMP 01/06/2015 (Exact Date)    BP Readings from Last 1 Encounters:   05/23/23 109/65     Pulse Readings from Last 1 Encounters:   05/23/23 68     Wt Readings from Last 1 Encounters:   05/23/23 249 lb (112.9 kg)     Ht Readings from Last 1 Encounters:   05/23/23 5' (1.524 m)     Estimated body mass index is 48.63 kg/m  as calculated from the following:    Height as of an earlier encounter on 5/23/23: 5' (1.524 m).    Weight as of an earlier encounter on 5/23/23: 249 lb (112.9 kg).    Temp Readings from Last 1 Encounters:   05/23/23 97.6  F (36.4  C) (Temporal)      ----------------      I spent 45 minutes with this patient today. All changes were made via verbal approval with Albino Rainey MD. A copy of the visit note was provided to the patient's provider(s).    A summary of these recommendations was given to the patient (see AVS from today's appointment with PCP).    Eugenia De Jesus, PharmD, BCACP   Medication Management Pharmacist   [facility]  [phone]      Medication Therapy Recommendations  Pain    Current Medication: topiramate (TOPAMAX) 200 MG tablet (Discontinued)   Rationale: Undesirable effect - Adverse medication event - Safety   Recommendation: Discontinue Medication   Status: Accepted per Provider

## 2023-05-25 LAB — BACTERIA UR CULT: NORMAL

## 2023-05-26 ENCOUNTER — TELEPHONE (OUTPATIENT)
Dept: FAMILY MEDICINE | Facility: CLINIC | Age: 63
End: 2023-05-26
Payer: COMMERCIAL

## 2023-05-26 DIAGNOSIS — M79.10 MYALGIA: Primary | ICD-10-CM

## 2023-05-26 LAB — CK SERPL-CCNC: 108 U/L (ref 26–192)

## 2023-05-26 RX ORDER — PREDNISONE 20 MG/1
20 TABLET ORAL DAILY
Qty: 15 TABLET | Refills: 0 | Status: SHIPPED | OUTPATIENT
Start: 2023-05-26 | End: 2023-06-10

## 2023-05-26 NOTE — TELEPHONE ENCOUNTER
Called and left detailed message with Edinson, the nurse at the assisted living facility.    Urine testing was negative for an infection.  The inflammatory markers were markedly elevated and I have concerns regarding a condition called polymyalgia rheumatica which could explain her generalized weakness, relatively recent and sudden onset, and generalized pain.  I am going to send a prescription for prednisone 20 mg daily over to the pharmacy and they should start this medication.  They should monitor her response to the medication and I would also ask that they keep the appointment scheduled for June 6 as we should do a follow-up assessment and laboratory work at that visit.  May contact the clinic through [employer] or phone if further questions.    Albino Rainey MD

## 2023-05-31 ENCOUNTER — MYC MEDICAL ADVICE (OUTPATIENT)
Dept: FAMILY MEDICINE | Facility: CLINIC | Age: 63
End: 2023-05-31
Payer: COMMERCIAL

## 2023-06-02 ENCOUNTER — MEDICAL CORRESPONDENCE (OUTPATIENT)
Dept: HEALTH INFORMATION MANAGEMENT | Facility: CLINIC | Age: 63
End: 2023-06-02
Payer: COMMERCIAL

## 2023-06-05 ENCOUNTER — HOSPITAL ENCOUNTER (EMERGENCY)
Facility: CLINIC | Age: 63
Discharge: NURSING, REHAB OR LONG-TERM CARE | End: 2023-06-05
Attending: FAMILY MEDICINE | Admitting: FAMILY MEDICINE
Payer: COMMERCIAL

## 2023-06-05 ENCOUNTER — APPOINTMENT (OUTPATIENT)
Dept: CT IMAGING | Facility: CLINIC | Age: 63
End: 2023-06-05
Attending: FAMILY MEDICINE
Payer: COMMERCIAL

## 2023-06-05 ENCOUNTER — APPOINTMENT (OUTPATIENT)
Dept: GENERAL RADIOLOGY | Facility: CLINIC | Age: 63
End: 2023-06-05
Attending: FAMILY MEDICINE
Payer: COMMERCIAL

## 2023-06-05 VITALS
SYSTOLIC BLOOD PRESSURE: 153 MMHG | HEIGHT: 60 IN | WEIGHT: 240 LBS | OXYGEN SATURATION: 98 % | HEART RATE: 69 BPM | TEMPERATURE: 98.1 F | RESPIRATION RATE: 18 BRPM | DIASTOLIC BLOOD PRESSURE: 78 MMHG | BODY MASS INDEX: 47.12 KG/M2

## 2023-06-05 DIAGNOSIS — R10.31 ABDOMINAL PAIN, RIGHT LOWER QUADRANT: ICD-10-CM

## 2023-06-05 DIAGNOSIS — K59.00 CONSTIPATION, UNSPECIFIED CONSTIPATION TYPE: ICD-10-CM

## 2023-06-05 LAB
ALBUMIN SERPL BCG-MCNC: 4.1 G/DL (ref 3.5–5.2)
ALBUMIN UR-MCNC: NEGATIVE MG/DL
ALP SERPL-CCNC: 71 U/L (ref 35–104)
ALT SERPL W P-5'-P-CCNC: 23 U/L (ref 10–35)
ANION GAP SERPL CALCULATED.3IONS-SCNC: 11 MMOL/L (ref 7–15)
APPEARANCE UR: CLEAR
AST SERPL W P-5'-P-CCNC: 15 U/L (ref 10–35)
BACTERIA #/AREA URNS HPF: ABNORMAL /HPF
BASOPHILS # BLD AUTO: 0.1 10E3/UL (ref 0–0.2)
BASOPHILS NFR BLD AUTO: 1 %
BILIRUB SERPL-MCNC: 0.3 MG/DL
BILIRUB UR QL STRIP: NEGATIVE
BUN SERPL-MCNC: 18.4 MG/DL (ref 8–23)
CALCIUM SERPL-MCNC: 9.4 MG/DL (ref 8.8–10.2)
CHLORIDE SERPL-SCNC: 106 MMOL/L (ref 98–107)
COLOR UR AUTO: ABNORMAL
CREAT SERPL-MCNC: 1.16 MG/DL (ref 0.51–0.95)
DEPRECATED HCO3 PLAS-SCNC: 20 MMOL/L (ref 22–29)
EOSINOPHIL # BLD AUTO: 0.1 10E3/UL (ref 0–0.7)
EOSINOPHIL NFR BLD AUTO: 2 %
ERYTHROCYTE [DISTWIDTH] IN BLOOD BY AUTOMATED COUNT: 13.4 % (ref 10–15)
GFR SERPL CREATININE-BSD FRML MDRD: 53 ML/MIN/1.73M2
GLUCOSE SERPL-MCNC: 222 MG/DL (ref 70–99)
GLUCOSE UR STRIP-MCNC: NEGATIVE MG/DL
HCT VFR BLD AUTO: 32.2 % (ref 35–47)
HGB BLD-MCNC: 10.1 G/DL (ref 11.7–15.7)
HGB UR QL STRIP: NEGATIVE
IMM GRANULOCYTES # BLD: 0 10E3/UL
IMM GRANULOCYTES NFR BLD: 1 %
KETONES UR STRIP-MCNC: NEGATIVE MG/DL
LACTATE SERPL-SCNC: 2 MMOL/L (ref 0.7–2)
LEUKOCYTE ESTERASE UR QL STRIP: NEGATIVE
LYMPHOCYTES # BLD AUTO: 2.3 10E3/UL (ref 0.8–5.3)
LYMPHOCYTES NFR BLD AUTO: 45 %
MCH RBC QN AUTO: 32.4 PG (ref 26.5–33)
MCHC RBC AUTO-ENTMCNC: 31.4 G/DL (ref 31.5–36.5)
MCV RBC AUTO: 103 FL (ref 78–100)
MONOCYTES # BLD AUTO: 0.5 10E3/UL (ref 0–1.3)
MONOCYTES NFR BLD AUTO: 10 %
NEUTROPHILS # BLD AUTO: 2.1 10E3/UL (ref 1.6–8.3)
NEUTROPHILS NFR BLD AUTO: 41 %
NITRATE UR QL: NEGATIVE
NRBC # BLD AUTO: 0 10E3/UL
NRBC BLD AUTO-RTO: 0 /100
PH UR STRIP: 7 [PH] (ref 5–7)
PLATELET # BLD AUTO: 212 10E3/UL (ref 150–450)
POTASSIUM SERPL-SCNC: 4.5 MMOL/L (ref 3.4–5.3)
PROT SERPL-MCNC: 7.1 G/DL (ref 6.4–8.3)
RBC # BLD AUTO: 3.12 10E6/UL (ref 3.8–5.2)
RBC URINE: 1 /HPF
SODIUM SERPL-SCNC: 137 MMOL/L (ref 136–145)
SP GR UR STRIP: 1.01 (ref 1–1.03)
SQUAMOUS EPITHELIAL: 2 /HPF
UROBILINOGEN UR STRIP-MCNC: NORMAL MG/DL
WBC # BLD AUTO: 5 10E3/UL (ref 4–11)
WBC URINE: 1 /HPF

## 2023-06-05 PROCEDURE — 96374 THER/PROPH/DIAG INJ IV PUSH: CPT | Performed by: FAMILY MEDICINE

## 2023-06-05 PROCEDURE — 36415 COLL VENOUS BLD VENIPUNCTURE: CPT | Performed by: FAMILY MEDICINE

## 2023-06-05 PROCEDURE — 258N000003 HC RX IP 258 OP 636: Performed by: FAMILY MEDICINE

## 2023-06-05 PROCEDURE — 99285 EMERGENCY DEPT VISIT HI MDM: CPT | Mod: 25 | Performed by: FAMILY MEDICINE

## 2023-06-05 PROCEDURE — 74176 CT ABD & PELVIS W/O CONTRAST: CPT

## 2023-06-05 PROCEDURE — 99284 EMERGENCY DEPT VISIT MOD MDM: CPT | Performed by: FAMILY MEDICINE

## 2023-06-05 PROCEDURE — 96361 HYDRATE IV INFUSION ADD-ON: CPT | Performed by: FAMILY MEDICINE

## 2023-06-05 PROCEDURE — 250N000013 HC RX MED GY IP 250 OP 250 PS 637: Performed by: FAMILY MEDICINE

## 2023-06-05 PROCEDURE — 96375 TX/PRO/DX INJ NEW DRUG ADDON: CPT | Performed by: FAMILY MEDICINE

## 2023-06-05 PROCEDURE — 87040 BLOOD CULTURE FOR BACTERIA: CPT | Performed by: FAMILY MEDICINE

## 2023-06-05 PROCEDURE — 83605 ASSAY OF LACTIC ACID: CPT | Performed by: FAMILY MEDICINE

## 2023-06-05 PROCEDURE — 81001 URINALYSIS AUTO W/SCOPE: CPT | Performed by: FAMILY MEDICINE

## 2023-06-05 PROCEDURE — 80053 COMPREHEN METABOLIC PANEL: CPT | Performed by: FAMILY MEDICINE

## 2023-06-05 PROCEDURE — 250N000011 HC RX IP 250 OP 636: Performed by: FAMILY MEDICINE

## 2023-06-05 PROCEDURE — 71046 X-RAY EXAM CHEST 2 VIEWS: CPT

## 2023-06-05 PROCEDURE — 85025 COMPLETE CBC W/AUTO DIFF WBC: CPT | Performed by: FAMILY MEDICINE

## 2023-06-05 RX ORDER — ONDANSETRON 2 MG/ML
4 INJECTION INTRAMUSCULAR; INTRAVENOUS ONCE
Status: COMPLETED | OUTPATIENT
Start: 2023-06-05 | End: 2023-06-05

## 2023-06-05 RX ORDER — SODIUM CHLORIDE 9 MG/ML
INJECTION, SOLUTION INTRAVENOUS
Status: DISCONTINUED
Start: 2023-06-05 | End: 2023-06-05 | Stop reason: HOSPADM

## 2023-06-05 RX ORDER — HYDROMORPHONE HYDROCHLORIDE 1 MG/ML
0.5 INJECTION, SOLUTION INTRAMUSCULAR; INTRAVENOUS; SUBCUTANEOUS ONCE
Status: COMPLETED | OUTPATIENT
Start: 2023-06-05 | End: 2023-06-05

## 2023-06-05 RX ORDER — DOCUSATE SODIUM 100 MG/1
100 CAPSULE, LIQUID FILLED ORAL 2 TIMES DAILY
Status: DISCONTINUED | OUTPATIENT
Start: 2023-06-05 | End: 2023-06-05 | Stop reason: HOSPADM

## 2023-06-05 RX ORDER — SODIUM CHLORIDE 9 MG/ML
INJECTION, SOLUTION INTRAVENOUS CONTINUOUS
Status: DISCONTINUED | OUTPATIENT
Start: 2023-06-05 | End: 2023-06-05 | Stop reason: HOSPADM

## 2023-06-05 RX ORDER — DOCUSATE SODIUM 100 MG/1
100 CAPSULE, LIQUID FILLED ORAL 2 TIMES DAILY
Qty: 40 CAPSULE | Refills: 0 | Status: ON HOLD | OUTPATIENT
Start: 2023-06-05 | End: 2023-09-20

## 2023-06-05 RX ADMIN — DOCUSATE SODIUM 100 MG: 100 CAPSULE, LIQUID FILLED ORAL at 20:28

## 2023-06-05 RX ADMIN — HYDROMORPHONE HYDROCHLORIDE 0.5 MG: 1 INJECTION, SOLUTION INTRAMUSCULAR; INTRAVENOUS; SUBCUTANEOUS at 14:32

## 2023-06-05 RX ADMIN — MINERAL OIL 226 ML: 1 OIL ORAL at 18:50

## 2023-06-05 RX ADMIN — ONDANSETRON 4 MG: 2 INJECTION INTRAMUSCULAR; INTRAVENOUS at 16:45

## 2023-06-05 RX ADMIN — SODIUM CHLORIDE 500 ML: 9 INJECTION, SOLUTION INTRAVENOUS at 16:46

## 2023-06-05 RX ADMIN — SODIUM CHLORIDE: 9 INJECTION, SOLUTION INTRAVENOUS at 17:12

## 2023-06-05 ASSESSMENT — ACTIVITIES OF DAILY LIVING (ADL)
ADLS_ACUITY_SCORE: 38

## 2023-06-05 NOTE — ED NOTES
Bed: ED05  Expected date: 6/5/23  Expected time: 12:18 PM  Means of arrival: Ambulance  Comments:  Emeigh [address]  64yo F  Back and Flank pain  From adult day care

## 2023-06-05 NOTE — DISCHARGE INSTRUCTIONS
Discharge to group home with plans to start Colace and follow-up with your primary care doctor tomorrow as discussed.

## 2023-06-05 NOTE — ED PROVIDER NOTES
[facility] [place]    6/05/23           History     Chief Complaint   Patient presents with     Back Pain     Patient presents via ems; currently reside in group home in [place]. Patient was at a day program complaining of right back pain. Patient reports pain present for 3 weeks. Patient had a recent UTI. Patient has chronic back pain.      HPI  Nena Tang is a 63 year old female who with PMH of CAD, type 2 diabetes, mild nonproliferative diabetic retinopathy, hypertension, cocaine abuse, and schizoaffective disorder depressive type presents to the ED with right flank pain and abdominal pain.  Patient notes that she did have a recent UTI and she also has a history of chronic low back pain.  Denies any change in bowel or bladder no burning with urination but does note intense right flank pain radiating to the right lower quadrant.    Past Medical History  Past Medical History:   Diagnosis Date     Acute respiratory failure with hypoxia (H) 9/4/2017     CAD (coronary artery disease)     5/2014 cath, nonbostructive stenosis to LAD, RCA.     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/2011     Noncompliance with medication regimen 6/8/2011     Obesity      CINDY (obstructive sleep apnea) 3/8/2012    uses CPAP     CINDY (obstructive sleep apnea)- mild AHI 10.3      Osteopenia 10/7/2009     Pneumonia of right lower lobe due to infectious organism 9/4/2017     Schizoaffective disorder, depressive type (H) 2/25/2013     Sepsis (H) 8/29/2017     Suicidal intent 10/2/2013     Takotsubo cardiomyopathy      Type 2 diabetes mellitus (H) 8/30/2011     Uterine cancer  (H) 1983     Verbal auditory hallucination 10/4/2012     Past Surgical History:   Procedure Laterality Date     CATARACT IOL, RT/LT Bilateral 2017     CHOLECYSTECTOMY       COLONOSCOPY N/A 3/16/2017    Procedure: COLONOSCOPY;  Surgeon: Traci Gonzalez MD;  Location:  GI     Coronary CTA  5/21/2014     HYSTERECTOMY  1983    uterine cancer yearly pap's per provider.     HYSTERECTOMY       LAPAROSCOPIC CHOLECYSTECTOMY  2008     PHACOEMULSIFICATION CLEAR CORNEA WITH STANDARD INTRAOCULAR LENS IMPLANT Left 5/5/2017    Procedure: PHACOEMULSIFICATION CLEAR CORNEA WITH STANDARD INTRAOCULAR LENS IMPLANT;  LEFT EYE PHACOEMULSIFICATION CLEAR CORNEA WITH STANDARD INTRAOCULAR LENS IMPLANT ;  Surgeon: Tyra Diaz MD;  Location:  EC     PHACOEMULSIFICATION CLEAR CORNEA WITH STANDARD INTRAOCULAR LENS IMPLANT Right 6/30/2017    Procedure: PHACOEMULSIFICATION CLEAR CORNEA WITH STANDARD INTRAOCULAR LENS IMPLANT;  RIGHT EYE PHACOEMULSIFICATION CLEAR CORNEA WITH STANDARD INTRAOCULAR LENS IMPLANT;  Surgeon: Tyra Diaz MD;  Location:  EC     RELEASE TRIGGER FINGER  10/11/2012    Left thumb. Procedure: RELEASE TRIGGER FINGER;  LEFT THUMB TRIGGER RELEASE;  Surgeon: Tay Langley MD;  Location:  SD     RELEASE TRIGGER FINGER Right 12/26/2016    Procedure: RELEASE TRIGGER FINGER;  Surgeon: Albino Castañeda MD;  Location:  OR     [facility] OOPHORECTORMY FOR MALIG, W/BX  1983    UTERINE     docusate sodium (COLACE) 100 MG capsule  acetaminophen (TYLENOL) 500 MG tablet  Alcohol Swabs (EASY TOUCH ALCOHOL PREP MEDIUM) 70 % PADS  alendronate (FOSAMAX) 70 MG tablet  amantadine (SYMMETREL) 100 MG capsule  aspirin (ASPIRIN LOW DOSE) 81 MG EC tablet  atorvastatin (LIPITOR) 80 MG tablet  blood glucose (NO BRAND SPECIFIED) test strip  calcium carbonate (OS-ALLEN) 1500 (600 Ca) MG tablet  clonazePAM (KLONOPIN) 0.5 MG tablet  Continuous Blood Gluc Sensor (DEXCOM G6 SENSOR) MISC  Continuous Blood Gluc Transmit (DEXCOM G6  TRANSMITTER) MISC  diclofenac (VOLTAREN) 1 % topical gel  escitalopram (LEXAPRO) 20 MG tablet  gabapentin (NEURONTIN) 300 MG capsule  hydrOXYzine (VISTARIL) 25 MG capsule  insulin glargine (LANTUS PEN) 100 UNIT/ML pen  insulin lispro (HUMALOG KWIKPEN) 100 UNIT/ML (1 unit dial) KWIKPEN  insulin pen needle (NOVOFINE 30) 30G X 8 MM miscellaneous  losartan (COZAAR) 100 MG tablet  magnesium 250 MG tablet  metoprolol succinate ER (TOPROL XL) 50 MG 24 hr tablet  MYRBETRIQ 25 MG 24 hr tablet  nystatin (MYCOSTATIN) 896688 UNIT/GM external powder  ondansetron (ZOFRAN) 4 MG tablet  OneTouch Delica Lancets 33G MISC  order for DME  order for DME  paliperidone ER (INVEGA) 9 MG 24 hr tablet  pantoprazole (PROTONIX) 40 MG EC tablet  prazosin (MINIPRESS) 1 MG capsule  predniSONE (DELTASONE) 20 MG tablet  QUEtiapine (SEROQUEL) 100 MG tablet  senna-docusate (SENOKOT-S/PERICOLACE) 8.6-50 MG tablet  thin (NO BRAND SPECIFIED) lancets  topiramate (TOPAMAX) 50 MG tablet  traZODone (DESYREL) 100 MG tablet  TRULICITY 3 MG/0.5ML SOPN  vitamin C (ASCORBIC ACID) 500 MG tablet  zinc oxide (DESITIN) 20 % external ointment      Allergies   Allergen Reactions     Imidazole Antifungals Hives     Tolerates diflucan     Ketoprofen Itching     Pruritis to topical     Lisinopril Hives     Metformin Other (See Comments)     Patient hospitalized for lactic acidosis - admitting provider suspectd caused by metformin     Metronidazole Hives     Posaconazole Hives     Tolerates diflucan     Family History  Family History   Problem Relation Age of Onset     Cancer Mother         BLADDER     Respiratory Mother         COPD     Gastrointestinal Disease Mother         CIRRHOSIS OF LI BOLIVAR     Alcohol/Drug Mother      Diabetes Mother      Hypertension Mother      Lipids Mother      C.A.D. Mother      Glaucoma Mother      Alcohol/Drug Sister      Mental Illness Sister      Alcohol/Drug Sister      Psychotic Disorder Sister      Cancer Maternal Grandmother          UNKNOWN TYPE     Cancer Brother         COLON     Cancer - colorectal Brother         IN HIS LATE 30S     Alcohol/Drug Brother          OF HEROIN OVERDOSE AT AGE 22 YRS     Macular Degeneration No family hx of      Social History   Social History     Tobacco Use     Smoking status: Never     Smokeless tobacco: Never   Vaping Use     Vaping status: Never Used   Substance Use Topics     Alcohol use: Not Currently     Comment: when younger     Drug use: No     Comment: history of      Past medical history, past surgical history, medications, allergies, family history, and social history were reviewed with the patient. No additional pertinent items.      A complete review of systems was performed with pertinent positives and negatives noted in the HPI, and all other systems negative.    Physical Exam   BP: (!) 153/78  Pulse: 69  Temp: 98.1  F (36.7  C)  Resp: 18  Height: 152.4 cm (5')  Weight: 108.9 kg (240 lb)  SpO2: 100 %  Physical Exam  Constitutional:       General: She is not in acute distress.     Appearance: Normal appearance. She is not diaphoretic.   HENT:      Head: Atraumatic.      Mouth/Throat:      Mouth: Mucous membranes are moist.   Eyes:      General: No scleral icterus.     Conjunctiva/sclera: Conjunctivae normal.   Cardiovascular:      Rate and Rhythm: Normal rate.      Heart sounds: Normal heart sounds.   Pulmonary:      Effort: No respiratory distress.      Breath sounds: Normal breath sounds.   Abdominal:      General: Abdomen is flat.      Tenderness: There is abdominal tenderness in the right lower quadrant. There is right CVA tenderness. There is no guarding or rebound.   Musculoskeletal:      Cervical back: Neck supple.   Skin:     General: Skin is warm.      Findings: No rash.   Neurological:      General: No focal deficit present.      Mental Status: She is alert and oriented to person, place, and time.      Sensory: No sensory deficit.      Motor: No weakness.      Coordination:  Coordination normal.   Psychiatric:         Mood and Affect: Mood is anxious.         Speech: Speech normal.         Behavior: Behavior is cooperative.         Thought Content: Thought content does not include homicidal or suicidal ideation.         ED Course, Procedures, & Data      Procedures                Results for orders placed or performed during the hospital encounter of 06/05/23   CT Abdomen Pelvis w/o Contrast     Status: None    Narrative    CT ABDOMEN PELVIS WITHOUT CONTRAST  6/5/2023 2:08 PM    CLINICAL HISTORY: Right flank pain  TECHNIQUE: CT scan of the abdomen and pelvis was performed without IV  contrast. Multiplanar reformats were obtained. Dose reduction  techniques were used.  CONTRAST: None.    COMPARISON: CT abdomen and pelvis 9/27/2022.    FINDINGS:   LOWER CHEST: Normal.    HEPATOBILIARY: Cholecystectomy. Liver unremarkable.    PANCREAS: Normal.    SPLEEN: Normal.    ADRENAL GLANDS: Normal.    KIDNEYS/BLADDER: No hydronephrosis or ureter stone. No bladder stone.  Nonobstructing stone again seen at the right lower kidney measuring 4  mm image 174.    BOWEL: Normal appendix. No acute bowel abnormality. No obstruction.    LYMPH NODES: Normal.    VASCULATURE: Diffuse vascular calcifications    PELVIC ORGANS: No acute abnormality. Uterus not seen.    OTHER: None.    MUSCULOSKELETAL: Unremarkable.      Impression    IMPRESSION:   1.  No acute abnormality identified.  2.  Stable nonobstructing stone within the right kidney. No  hydronephrosis or ureter stone. Normal appendix.    MONE VALE MD         SYSTEM ID:  [id number]   XR Chest 2 Views     Status: None    Narrative    EXAM: XR CHEST 2 VIEWS  LOCATION: [facility]  DATE/TIME: 6/5/2023 5:34 PM CDT    INDICATION: cough  COMPARISON: 09/25/2021      Impression    IMPRESSION: Lungs are clear without consolidation or effusion. Cardiac silhouette remains enlarged, central pulmonary vasculature within normal  limits. Osseous structures intact.   Comprehensive metabolic panel     Status: Abnormal   Result Value Ref Range    Sodium 137 136 - 145 mmol/L    Potassium 4.5 3.4 - 5.3 mmol/L    Chloride 106 98 - 107 mmol/L    Carbon Dioxide (CO2) 20 (L) 22 - 29 mmol/L    Anion Gap 11 7 - 15 mmol/L    Urea Nitrogen 18.4 8.0 - 23.0 mg/dL    Creatinine 1.16 (H) 0.51 - 0.95 mg/dL    Calcium 9.4 8.8 - 10.2 mg/dL    Glucose 222 (H) 70 - 99 mg/dL    Alkaline Phosphatase 71 35 - 104 U/L    AST 15 10 - 35 U/L    ALT 23 10 - 35 U/L    Protein Total 7.1 6.4 - 8.3 g/dL    Albumin 4.1 3.5 - 5.2 g/dL    Bilirubin Total 0.3 <=1.2 mg/dL    GFR Estimate 53 (L) >60 mL/min/1.73m2   UA with Microscopic reflex to Culture     Status: Abnormal    Specimen: Urine, Clean Catch   Result Value Ref Range    Color Urine Straw Colorless, Straw, Light Yellow, Yellow    Appearance Urine Clear Clear    Glucose Urine Negative Negative mg/dL    Bilirubin Urine Negative Negative    Ketones Urine Negative Negative mg/dL    Specific Gravity Urine 1.011 1.003 - 1.035    Blood Urine Negative Negative    pH Urine 7.0 5.0 - 7.0    Protein Albumin Urine Negative Negative mg/dL    Urobilinogen Urine Normal Normal, 2.0 mg/dL    Nitrite Urine Negative Negative    Leukocyte Esterase Urine Negative Negative    Bacteria Urine Few (A) None Seen /HPF    RBC Urine 1 <=2 /HPF    WBC Urine 1 <=5 /HPF    Squamous Epithelials Urine 2 (H) <=1 /HPF    Narrative    Urine Culture not indicated   Lactic acid whole blood     Status: Normal   Result Value Ref Range    Lactic Acid 2.0 0.7 - 2.0 mmol/L   CBC with platelets and differential     Status: Abnormal   Result Value Ref Range    WBC Count 5.0 4.0 - 11.0 10e3/uL    RBC Count 3.12 (L) 3.80 - 5.20 10e6/uL    Hemoglobin 10.1 (L) 11.7 - 15.7 g/dL    Hematocrit 32.2 (L) 35.0 - 47.0 %     (H) 78 - 100 fL    MCH 32.4 26.5 - 33.0 pg    MCHC 31.4 (L) 31.5 - 36.5 g/dL    RDW 13.4 10.0 - 15.0 %    Platelet Count 212 150 - 450 10e3/uL    %  Neutrophils 41 %    % Lymphocytes 45 %    % Monocytes 10 %    % Eosinophils 2 %    % Basophils 1 %    % Immature Granulocytes 1 %    NRBCs per 100 WBC 0 <1 /100    Absolute Neutrophils 2.1 1.6 - 8.3 10e3/uL    Absolute Lymphocytes 2.3 0.8 - 5.3 10e3/uL    Absolute Monocytes 0.5 0.0 - 1.3 10e3/uL    Absolute Eosinophils 0.1 0.0 - 0.7 10e3/uL    Absolute Basophils 0.1 0.0 - 0.2 10e3/uL    Absolute Immature Granulocytes 0.0 <=0.4 10e3/uL    Absolute NRBCs 0.0 10e3/uL   Blood Culture Peripheral Blood     Status: Normal (Preliminary result)    Specimen: Peripheral Blood   Result Value Ref Range    Culture No growth after 2 days    CBC with platelets differential     Status: Abnormal    Narrative    The following orders were created for panel order CBC with platelets differential.  Procedure                               Abnormality         Status                     ---------                               -----------         ------                     CBC with platelets and d...[[id number]]  Abnormal            Final result                 Please view results for these tests on the individual orders.     Medications   0.9% sodium chloride BOLUS (0 mLs Intravenous Stopped 6/5/23 1712)   HYDROmorphone (PF) (DILAUDID) injection 0.5 mg (0.5 mg Intravenous $Given 6/5/23 1432)   ondansetron (ZOFRAN) injection 4 mg (4 mg Intravenous $Given 6/5/23 1645)   Enema Compound (docusate/mineral oil/NaPhos) NO MAG CIT PREMIX (226 mLs Rectal $Given 6/5/23 1850)     Labs Ordered and Resulted from Time of ED Arrival to Time of ED Departure   COMPREHENSIVE METABOLIC PANEL - Abnormal       Result Value    Sodium 137      Potassium 4.5      Chloride 106      Carbon Dioxide (CO2) 20 (*)     Anion Gap 11      Urea Nitrogen 18.4      Creatinine 1.16 (*)     Calcium 9.4      Glucose 222 (*)     Alkaline Phosphatase 71      AST 15      ALT 23      Protein Total 7.1      Albumin 4.1      Bilirubin Total 0.3      GFR Estimate 53 (*)    ROUTINE UA  WITH MICROSCOPIC REFLEX TO CULTURE - Abnormal    Color Urine Straw      Appearance Urine Clear      Glucose Urine Negative      Bilirubin Urine Negative      Ketones Urine Negative      Specific Gravity Urine 1.011      Blood Urine Negative      pH Urine 7.0      Protein Albumin Urine Negative      Urobilinogen Urine Normal      Nitrite Urine Negative      Leukocyte Esterase Urine Negative      Bacteria Urine Few (*)     RBC Urine 1      WBC Urine 1      Squamous Epithelials Urine 2 (*)    CBC WITH PLATELETS AND DIFFERENTIAL - Abnormal    WBC Count 5.0      RBC Count 3.12 (*)     Hemoglobin 10.1 (*)     Hematocrit 32.2 (*)      (*)     MCH 32.4      MCHC 31.4 (*)     RDW 13.4      Platelet Count 212      % Neutrophils 41      % Lymphocytes 45      % Monocytes 10      % Eosinophils 2      % Basophils 1      % Immature Granulocytes 1      NRBCs per 100 WBC 0      Absolute Neutrophils 2.1      Absolute Lymphocytes 2.3      Absolute Monocytes 0.5      Absolute Eosinophils 0.1      Absolute Basophils 0.1      Absolute Immature Granulocytes 0.0      Absolute NRBCs 0.0     LACTIC ACID WHOLE BLOOD - Normal    Lactic Acid 2.0       XR Chest 2 Views   Final Result   IMPRESSION: Lungs are clear without consolidation or effusion. Cardiac silhouette remains enlarged, central pulmonary vasculature within normal limits. Osseous structures intact.      CT Abdomen Pelvis w/o Contrast   Final Result   IMPRESSION:    1.  No acute abnormality identified.   2.  Stable nonobstructing stone within the right kidney. No   hydronephrosis or ureter stone. Normal appendix.      MONE VALE MD            SYSTEM ID:  [id number]             Critical care was not performed.     Medical Decision Making  The patient's presentation was of moderate complexity (a chronic illness mild to moderate exacerbation, progression, or side effect of treatment).    The patient's evaluation involved:  ordering and/or review of 3+ test(s) in this encounter  (see separate area of note for details)    The patient's management necessitated moderate risk (prescription drug management including medications given in the ED).      Assessment & Plan        I have reviewed the nursing notes. I have reviewed the findings, diagnosis, plan and need for follow up with the patient.    Discharge Medication List as of 6/5/2023  8:26 PM      START taking these medications    Details   docusate sodium (COLACE) 100 MG capsule Take 1 capsule (100 mg) by mouth 2 times daily, Disp-40 capsule, R-0, Local Print             Final diagnoses:   Abdominal pain, right lower quadrant   Constipation, unspecified constipation type       Emigdio Chavez  [facility] EMERGENCY DEPARTMENT  6/5/2023     Emigdio Chavez MD  06/07/23 9530

## 2023-06-05 NOTE — ED TRIAGE NOTES
Patient presents via ems; currently reside in group home in [place]. Patient was at a day program complaining of right back pain. Patient reports pain present for 3 weeks. Patient had a recent UTI. Patient has chronic back pain.      Triage Assessment     Row Name 06/05/23 1221       Triage Assessment (Adult)    Airway WDL WDL       Respiratory WDL    Respiratory WDL WDL       Skin Circulation/Temperature WDL    Skin Circulation/Temperature WDL WDL       Cardiac WDL    Cardiac WDL WDL       Peripheral/Neurovascular WDL    Peripheral Neurovascular WDL WDL       Cognitive/Neuro/Behavioral WDL    Cognitive/Neuro/Behavioral WDL WDL

## 2023-06-06 ENCOUNTER — OFFICE VISIT (OUTPATIENT)
Dept: FAMILY MEDICINE | Facility: CLINIC | Age: 63
End: 2023-06-06
Attending: FAMILY MEDICINE
Payer: COMMERCIAL

## 2023-06-06 ENCOUNTER — OFFICE VISIT (OUTPATIENT)
Dept: PHARMACY | Facility: CLINIC | Age: 63
End: 2023-06-06
Payer: COMMERCIAL

## 2023-06-06 VITALS
OXYGEN SATURATION: 98 % | HEIGHT: 60 IN | HEART RATE: 69 BPM | TEMPERATURE: 97 F | SYSTOLIC BLOOD PRESSURE: 137 MMHG | DIASTOLIC BLOOD PRESSURE: 84 MMHG | RESPIRATION RATE: 13 BRPM | WEIGHT: 240 LBS | BODY MASS INDEX: 47.12 KG/M2

## 2023-06-06 DIAGNOSIS — Z79.4 TYPE 2 DIABETES MELLITUS WITHOUT COMPLICATION, WITH LONG-TERM CURRENT USE OF INSULIN (H): Primary | ICD-10-CM

## 2023-06-06 DIAGNOSIS — R52 PAIN: Primary | ICD-10-CM

## 2023-06-06 DIAGNOSIS — E11.9 TYPE 2 DIABETES MELLITUS WITHOUT COMPLICATION, WITH LONG-TERM CURRENT USE OF INSULIN (H): Primary | ICD-10-CM

## 2023-06-06 DIAGNOSIS — Z79.4 TYPE 2 DIABETES MELLITUS WITH MILD NONPROLIFERATIVE RETINOPATHY WITHOUT MACULAR EDEMA, WITH LONG-TERM CURRENT USE OF INSULIN, UNSPECIFIED LATERALITY (H): ICD-10-CM

## 2023-06-06 DIAGNOSIS — E11.3299 TYPE 2 DIABETES MELLITUS WITH MILD NONPROLIFERATIVE RETINOPATHY WITHOUT MACULAR EDEMA, WITH LONG-TERM CURRENT USE OF INSULIN, UNSPECIFIED LATERALITY (H): ICD-10-CM

## 2023-06-06 DIAGNOSIS — M79.10 MYALGIA: ICD-10-CM

## 2023-06-06 PROCEDURE — 99214 OFFICE O/P EST MOD 30 MIN: CPT | Performed by: FAMILY MEDICINE

## 2023-06-06 PROCEDURE — 99606 MTMS BY PHARM EST 15 MIN: CPT | Performed by: PHARMACIST

## 2023-06-06 PROCEDURE — 99607 MTMS BY PHARM ADDL 15 MIN: CPT | Performed by: PHARMACIST

## 2023-06-06 ASSESSMENT — PAIN SCALES - GENERAL: PAINLEVEL: SEVERE PAIN (7)

## 2023-06-06 NOTE — ED NOTES
Called  and spoke with Edinson, informed pt is being discharged.  staff cannot pick her up, okay for pt to be transported via taxi to the .

## 2023-06-06 NOTE — PATIENT INSTRUCTIONS
Stop the prazosin if ok with psychiatry.    2.  Start prednisone 20 mg daily in the morning.    3.  Update me by phone or MyChart later this week or early next week with how Nena is doing after starting the prednisone.    4.  Follow up in 2 weeks for a recheck and additional lab testing.

## 2023-06-06 NOTE — PROGRESS NOTES
Assessment & Plan     Type 2 diabetes mellitus without complication, with long-term current use of insulin (H)  Recent sugars have improved.  She will be due for an A1c at her next visit.    Myalgia  We reviewed in detail her recent test results showing significant evidence of inflammation with elevation of C-reactive protein and sed rate.  I am suspicious that she may have polymyalgia rheumatica and it sent a prescription for prednisone over to the pharmacy but we verified that that has not yet arrived at the facility and they have not started it yet so I cannot assess any change since her last visit.  Reviewed this potential condition in detail with the patient, accompanying staff, and the nurse at the facility over the phone.  Follow-up was scheduled in 2 weeks.  They will start the prednisone and can update me later this week to let me know how things are going.             MED REC REQUIRED  Post Medication Reconciliation Status: discharge medications reconciled, continue medications without change  BMI:   Estimated body mass index is 46.87 kg/m  as calculated from the following:    Height as of this encounter: 1.524 m (5').    Weight as of this encounter: 108.9 kg (240 lb).   Weight management plan: Discussed healthy diet and exercise guidelines        Albino Rainey MD  [facility]    Surjit Barrett is a 63 year old, presenting for the following health issues:  Follow Up and ER F/U        6/6/2023    11:25 AM   Additional Questions   Roomed by Karma Kerns   Accompanied by Praveena HOBBS     History of Present Illness       Reason for visit:  Right side pain    She eats 2-3 servings of fruits and vegetables daily.She consumes 2 sweetened beverage(s) daily.She exercises with enough effort to increase her heart rate 9 or less minutes per day.  She exercises with enough effort to increase her heart rate 3 or less days per week.   She is taking medications regularly.          Hospital Follow-up Visit:    Hospital/Nursing Home/IP Rehab Facility: [facility]  Date of Admission: ER visit June 5  Date of Discharge:    Reason(s) for Admission: Back pain    Was your hospitalization related to COVID-19? No   Problems taking medications regularly:  None  Medication changes since discharge: None  Problems adhering to non-medication therapy:  None    Summary of hospitalization:  [facility] hospital discharge summary reviewed  Diagnostic Tests/Treatments reviewed.  Follow up needed: none  Other Healthcare Providers Involved in Patient s Care:         None  Update since discharge: stable.         Plan of care communicated with patient and caregiver             Patient was seen in the emergency department yesterday for right flank pain.  We spent some time reviewing the test results today.  She describes pain going on the right side from the bottom the shoulder plane down to above the hip.  She tends to sleep on that side and that will aggravate a bit.  Deep breathing can also bother it.  Eating does not affect it.  Twisting and turning can affect it.  I asked her what she thinks and she thinks it is probably muscular.  I would tend to agree with that.    We reviewed laboratory testing in detail from the ER yesterday as well as her last visit with me documenting significant elevation in inflammatory markers including CRP and sed rate.  Given the context of her symptoms and her age I would be suspicious of condition called polymyalgia rheumatica.  I had sent a prescription for prednisone over the pharmacy after the last visit but they say they have not started that yet.      Review of Systems   Constitutional, HEENT, cardiovascular, pulmonary, gi and gu systems are negative, except as otherwise noted.      Objective    /84   Pulse 69   Temp 97  F (36.1  C) (Temporal)   Resp 13   Ht 1.524 m (5')   Wt 108.9 kg (240 lb)   LMP  01/06/2015 (Exact Date)   SpO2 98%   BMI 46.87 kg/m    Body mass index is 46.87 kg/m .  Physical Exam   GENERAL: healthy, alert and no distress  EYES: Eyes grossly normal to inspection, PERRL and conjunctivae and sclerae normal  RESP: lungs clear to auscultation - no rales, rhonchi or wheezes  CV: regular rate and rhythm, normal S1 S2, no S3 or S4, no murmur, click or rub, no peripheral edema and peripheral pulses strong  MS: no gross musculoskeletal defects noted, no edema  NEURO: Normal strength and tone, mentation intact and speech normal  PSYCH: mentation appears normal and affect flat    Admission on 06/05/2023, Discharged on 06/05/2023   Component Date Value Ref Range Status     Sodium 06/05/2023 137  136 - 145 mmol/L Final     Potassium 06/05/2023 4.5  3.4 - 5.3 mmol/L Final     Chloride 06/05/2023 106  98 - 107 mmol/L Final     Carbon Dioxide (CO2) 06/05/2023 20 (L)  22 - 29 mmol/L Final     Anion Gap 06/05/2023 11  7 - 15 mmol/L Final     Urea Nitrogen 06/05/2023 18.4  8.0 - 23.0 mg/dL Final     Creatinine 06/05/2023 1.16 (H)  0.51 - 0.95 mg/dL Final     Calcium 06/05/2023 9.4  8.8 - 10.2 mg/dL Final     Glucose 06/05/2023 222 (H)  70 - 99 mg/dL Final     Alkaline Phosphatase 06/05/2023 71  35 - 104 U/L Final     AST 06/05/2023 15  10 - 35 U/L Final     ALT 06/05/2023 23  10 - 35 U/L Final     Protein Total 06/05/2023 7.1  6.4 - 8.3 g/dL Final     Albumin 06/05/2023 4.1  3.5 - 5.2 g/dL Final     Bilirubin Total 06/05/2023 0.3  <=1.2 mg/dL Final     GFR Estimate 06/05/2023 53 (L)  >60 mL/min/1.73m2 Final    eGFR calculated using 2021 CKD-EPI equation.     Color Urine 06/05/2023 Straw  Colorless, Straw, Light Yellow, Yellow Final     Appearance Urine 06/05/2023 Clear  Clear Final     Glucose Urine 06/05/2023 Negative  Negative mg/dL Final     Bilirubin Urine 06/05/2023 Negative  Negative Final     Ketones Urine 06/05/2023 Negative  Negative mg/dL Final     Specific Gravity Urine 06/05/2023 1.011  1.003 -  1.035 Final     Blood Urine 06/05/2023 Negative  Negative Final     pH Urine 06/05/2023 7.0  5.0 - 7.0 Final     Protein Albumin Urine 06/05/2023 Negative  Negative mg/dL Final     Urobilinogen Urine 06/05/2023 Normal  Normal, 2.0 mg/dL Final     Nitrite Urine 06/05/2023 Negative  Negative Final     Leukocyte Esterase Urine 06/05/2023 Negative  Negative Final     Bacteria Urine 06/05/2023 Few (A)  None Seen /HPF Final     RBC Urine 06/05/2023 1  <=2 /HPF Final     WBC Urine 06/05/2023 1  <=5 /HPF Final     Squamous Epithelials Urine 06/05/2023 2 (H)  <=1 /HPF Final     Lactic Acid 06/05/2023 2.0  0.7 - 2.0 mmol/L Final     Culture 06/05/2023 No growth after 12 hours   Preliminary     WBC Count 06/05/2023 5.0  4.0 - 11.0 10e3/uL Final     RBC Count 06/05/2023 3.12 (L)  3.80 - 5.20 10e6/uL Final     Hemoglobin 06/05/2023 10.1 (L)  11.7 - 15.7 g/dL Final     Hematocrit 06/05/2023 32.2 (L)  35.0 - 47.0 % Final     MCV 06/05/2023 103 (H)  78 - 100 fL Final     MCH 06/05/2023 32.4  26.5 - 33.0 pg Final     MCHC 06/05/2023 31.4 (L)  31.5 - 36.5 g/dL Final     RDW 06/05/2023 13.4  10.0 - 15.0 % Final     Platelet Count 06/05/2023 212  150 - 450 10e3/uL Final     % Neutrophils 06/05/2023 41  % Final     % Lymphocytes 06/05/2023 45  % Final     % Monocytes 06/05/2023 10  % Final     % Eosinophils 06/05/2023 2  % Final     % Basophils 06/05/2023 1  % Final     % Immature Granulocytes 06/05/2023 1  % Final     NRBCs per 100 WBC 06/05/2023 0  <1 /100 Final     Absolute Neutrophils 06/05/2023 2.1  1.6 - 8.3 10e3/uL Final     Absolute Lymphocytes 06/05/2023 2.3  0.8 - 5.3 10e3/uL Final     Absolute Monocytes 06/05/2023 0.5  0.0 - 1.3 10e3/uL Final     Absolute Eosinophils 06/05/2023 0.1  0.0 - 0.7 10e3/uL Final     Absolute Basophils 06/05/2023 0.1  0.0 - 0.2 10e3/uL Final     Absolute Immature Granulocytes 06/05/2023 0.0  <=0.4 10e3/uL Final     Absolute NRBCs 06/05/2023 0.0  10e3/uL Final

## 2023-06-06 NOTE — PROGRESS NOTES
Medication Therapy Management (MTM) Encounter    ASSESSMENT:                            Medication Adherence/Access: No issues identified    Hip,knee pain: trial of prednisone per PCP, see his notes for details    Type 2 Diabetes: A1c at goal <8%.   Time in range not at goal >50% for the last 2 weeks. Multifactorial.  Discussed plan regarding glucose control while taking prednisone. Initially will use correction scale with Humalog administration to compensate for anticipated elevated glucose.  If she is to continue prednisone with long taper, consider administration of NPH at the same time as taking prednisone.  Dosing - 0.15 units /kg for dose 10-39mg prednisone = 16 units once daily in the AM, administer at the same time as prednisone. This is in addition to her current regimen of basal/bolus insulin.     If prednisone dose is increased to 40mg/day, dose NPH at 0.3u/kg = 33 units once daily in the AM.    PLAN:                            Consider NPH 16 units once daily with prednisone if she is needing to continue prednisone longterm for extended taper - routing to PCP to address with next visit or via MyChart with [facility] staff.    Follow-up: 4-6 weeks    SUBJECTIVE/OBJECTIVE:                          Nena Tang is a 63 year old female coming in for a follow-up visit. Today's visit is a co-visit with Albino Rainey MD. Today's visit is a follow-up MTM visit from 5/23/23 Accompanied by Prave[facility] staff and Edinson JIN present via telephone.    Reason for visit: medication recheck.    Allergies/ADRs: Reviewed in chart  Past Medical History: Reviewed in chart  Tobacco: She reports that she has never smoked. She has never used smokeless tobacco.  Alcohol: not currently using    Medication Adherence/Access: no issues reported  Medications administered by [facility] staff.     Hip,knee pain: discussed possible PMR, see PCP notes for details. She has not started prednisone yet. Discussed with group home  [facility] by phone and she will update provider regarding efficacy later this week.       Type 2 Diabetes:  Currently taking Lantus 33 units twice daily, Humalog 8 units with meals +sliding scale 3u per 50mg/dL starting glucose 151 and max 23u. Trulicity 3 mg weekly. Side effects: constipation, manages with senna-docusate.  Blood sugar monitoring: Continuous Glucose Monitor. Ranges (glucometer)              Symptoms of low blood sugar? none  Symptoms of high blood sugar? none  Eye exam: up to date  Foot exam: up to date  Diet: states she has not been eating any candy for the last few days.   Aspirin: Taking 81mg daily.   Statin: Yes: Atorvastatin.   ACEi/ARB: Yes: Losartan.  Urine Albumin:   Lab Results   Component Value Date    MICROL 11 08/17/2022    MICROL 7 09/15/2020     Lab Results   Component Value Date    A1C 6.5 03/14/2023    A1C 6.4 12/12/2022    A1C 7.4 08/17/2022    A1C 8.0 06/21/2022    A1C 7.3 03/24/2022    A1C 9.1 12/01/2020    A1C 9.7 09/15/2020    A1C 8.6 06/30/2020    A1C 6.2 12/03/2019    A1C 6.6 08/06/2019        Today's Vitals: LMP 01/06/2015 (Exact Date)    BP Readings from Last 1 Encounters:   06/06/23 137/84     Pulse Readings from Last 1 Encounters:   06/06/23 69     Wt Readings from Last 1 Encounters:   06/06/23 240 lb (108.9 kg)     Ht Readings from Last 1 Encounters:   06/06/23 5' (1.524 m)     Estimated body mass index is 46.87 kg/m  as calculated from the following:    Height as of an earlier encounter on 6/6/23: 5' (1.524 m).    Weight as of an earlier encounter on 6/6/23: 240 lb (108.9 kg).    Temp Readings from Last 1 Encounters:   06/06/23 97  F (36.1  C) (Temporal)      ----------------      I spent 30 minutes with this patient today. All changes were made via collaborative practice agreement with Albino Rainey MD. A copy of the visit note was provided to the patient's provider(s).    A summary of these recommendations was given to the patient (see AVS from today's  appointment with PCP).    Eugenia De Jesus, PharmD, BCACP        Medication Therapy Recommendations  No medication therapy recommendations to display

## 2023-06-08 ENCOUNTER — MYC MEDICAL ADVICE (OUTPATIENT)
Dept: FAMILY MEDICINE | Facility: CLINIC | Age: 63
End: 2023-06-08
Payer: COMMERCIAL

## 2023-06-10 LAB — BACTERIA BLD CULT: NO GROWTH

## 2023-06-13 NOTE — Clinical Note
INFECTIOUS DISEASE PROGRESS NOTE    ASSESSMENT:   1. MSSA UTI   2. Staph aureus bacteremia  3. Left lower lobe infiltrate  4. HFpEF, new onset  6. LUCY on CKD    PLAN:   Anceph 3g IV q12h  TTE negative for vegetations, KARSON pending  MRI thoracic and lumbar spine. Pt will need sedation  Surveillance cultures pending    Discussed with Dr. Daniel and primary team.         Igor Jackson DO   IM PGY2    [phone]    -------------------------------------------------------------------------------------------------------------------    SUBJECTIVE:    Endorsing back pain and cramping and L wrist pain that started yesterday. Denies fevers/chills, chest pain, sob.     OBJECTIVE:  Tmax Temp (24hrs), Av.4 °F (36.9 °C), Min:97.5 °F (36.4 °C), Max:99.1 °F (37.3 °C)     Last Vitals   Vitals:    23 0859   BP:    Pulse:    Resp: 18   Temp:        Gen: NAD, alert and oriented  HEENT: anicteric, oropharynx clear  CVS: RRR  Lung: clear to ausculation bilaterally  Abd: non tender  Extr: no rashes. No lower extremity swelling. L wrist tenderness and limited ROM, no swelling or erythema  Skin: bruising and small abrasions on bilateral upper extremities    ANTIMICROBIALS  Ancef     LAB:  Recent Labs     23  0500 23  0428 23  0444   WBC 10.4 10.5 10.1   HGB 9.0* 9.1* 9.0*   HCT 27.6* 28.3* 28.2*    279 315   MCV 84.7 86.5 87.6       Recent Labs   Lab 23  0444 23  0428 23  0500   SODIUM 129* 130* 133*   POTASSIUM 4.9 5.1 4.5   CHLORIDE 99 101 102   CO2 25 25 24   BUN 51* 44* 40*   CREATININE 3.27* 2.85* 2.70*   GLUCOSE 108* 123* 119*   CALCIUM 8.2* 8.0* 7.6*       Microbiology Results  (Last 10 results in the past 7 days)    Specimen   Gram Smear   Culture Result   Status       23  0927         Gram positive cocci in clusters.  Comment:  Detected from aerobic bottle after    19 hours.    [P]                 [P] - Preliminary Result               RADIOLOGY: images reviewed  Hi Don, Can you review and accept Nena's note? Thanks! -Hussein       Note:  Assessment and Plan have been moved to the top of the screen for ease of the viewer such that the plan can be seen without scrolling to the bottom of the note.

## 2023-06-20 ENCOUNTER — OFFICE VISIT (OUTPATIENT)
Dept: FAMILY MEDICINE | Facility: CLINIC | Age: 63
End: 2023-06-20
Attending: FAMILY MEDICINE
Payer: COMMERCIAL

## 2023-06-20 VITALS
HEART RATE: 66 BPM | OXYGEN SATURATION: 98 % | WEIGHT: 239 LBS | DIASTOLIC BLOOD PRESSURE: 82 MMHG | BODY MASS INDEX: 45.12 KG/M2 | HEIGHT: 61 IN | SYSTOLIC BLOOD PRESSURE: 127 MMHG | TEMPERATURE: 96.8 F | RESPIRATION RATE: 16 BRPM

## 2023-06-20 DIAGNOSIS — R35.0 URINARY FREQUENCY: ICD-10-CM

## 2023-06-20 DIAGNOSIS — R07.81 RIB PAIN ON RIGHT SIDE: ICD-10-CM

## 2023-06-20 DIAGNOSIS — E11.9 TYPE 2 DIABETES MELLITUS WITHOUT COMPLICATION, WITH LONG-TERM CURRENT USE OF INSULIN (H): Primary | ICD-10-CM

## 2023-06-20 DIAGNOSIS — F25.1 SCHIZOAFFECTIVE DISORDER, DEPRESSIVE TYPE (H): ICD-10-CM

## 2023-06-20 DIAGNOSIS — E66.01 MORBID OBESITY (H): ICD-10-CM

## 2023-06-20 DIAGNOSIS — Z79.4 TYPE 2 DIABETES MELLITUS WITHOUT COMPLICATION, WITH LONG-TERM CURRENT USE OF INSULIN (H): Primary | ICD-10-CM

## 2023-06-20 DIAGNOSIS — M79.10 MYALGIA: ICD-10-CM

## 2023-06-20 LAB
ANION GAP SERPL CALCULATED.3IONS-SCNC: 14 MMOL/L (ref 7–15)
BASOPHILS # BLD AUTO: 0 10E3/UL (ref 0–0.2)
BASOPHILS NFR BLD AUTO: 0 %
BUN SERPL-MCNC: 24.7 MG/DL (ref 8–23)
CALCIUM SERPL-MCNC: 9.2 MG/DL (ref 8.8–10.2)
CHLORIDE SERPL-SCNC: 109 MMOL/L (ref 98–107)
CREAT SERPL-MCNC: 1.29 MG/DL (ref 0.51–0.95)
CRP SERPL-MCNC: <3 MG/L
DEPRECATED HCO3 PLAS-SCNC: 19 MMOL/L (ref 22–29)
EOSINOPHIL # BLD AUTO: 0.1 10E3/UL (ref 0–0.7)
EOSINOPHIL NFR BLD AUTO: 2 %
ERYTHROCYTE [DISTWIDTH] IN BLOOD BY AUTOMATED COUNT: 12.9 % (ref 10–15)
ERYTHROCYTE [SEDIMENTATION RATE] IN BLOOD BY WESTERGREN METHOD: 21 MM/HR (ref 0–30)
GFR SERPL CREATININE-BSD FRML MDRD: 47 ML/MIN/1.73M2
GLUCOSE SERPL-MCNC: 97 MG/DL (ref 70–99)
HBA1C MFR BLD: 6.7 % (ref 0–5.6)
HCT VFR BLD AUTO: 33.6 % (ref 35–47)
HGB BLD-MCNC: 10.8 G/DL (ref 11.7–15.7)
IMM GRANULOCYTES # BLD: 0.1 10E3/UL
IMM GRANULOCYTES NFR BLD: 1 %
LYMPHOCYTES # BLD AUTO: 2.3 10E3/UL (ref 0.8–5.3)
LYMPHOCYTES NFR BLD AUTO: 36 %
MCH RBC QN AUTO: 33.2 PG (ref 26.5–33)
MCHC RBC AUTO-ENTMCNC: 32.1 G/DL (ref 31.5–36.5)
MCV RBC AUTO: 103 FL (ref 78–100)
MONOCYTES # BLD AUTO: 0.4 10E3/UL (ref 0–1.3)
MONOCYTES NFR BLD AUTO: 6 %
NEUTROPHILS # BLD AUTO: 3.3 10E3/UL (ref 1.6–8.3)
NEUTROPHILS NFR BLD AUTO: 54 %
PLATELET # BLD AUTO: 156 10E3/UL (ref 150–450)
POTASSIUM SERPL-SCNC: 4.3 MMOL/L (ref 3.4–5.3)
RBC # BLD AUTO: 3.25 10E6/UL (ref 3.8–5.2)
SODIUM SERPL-SCNC: 142 MMOL/L (ref 136–145)
WBC # BLD AUTO: 6.2 10E3/UL (ref 4–11)

## 2023-06-20 PROCEDURE — 83036 HEMOGLOBIN GLYCOSYLATED A1C: CPT | Performed by: FAMILY MEDICINE

## 2023-06-20 PROCEDURE — 86140 C-REACTIVE PROTEIN: CPT | Performed by: FAMILY MEDICINE

## 2023-06-20 PROCEDURE — 86038 ANTINUCLEAR ANTIBODIES: CPT | Performed by: FAMILY MEDICINE

## 2023-06-20 PROCEDURE — 80048 BASIC METABOLIC PNL TOTAL CA: CPT | Performed by: FAMILY MEDICINE

## 2023-06-20 PROCEDURE — 36415 COLL VENOUS BLD VENIPUNCTURE: CPT | Performed by: FAMILY MEDICINE

## 2023-06-20 PROCEDURE — 85025 COMPLETE CBC W/AUTO DIFF WBC: CPT | Performed by: FAMILY MEDICINE

## 2023-06-20 PROCEDURE — 86431 RHEUMATOID FACTOR QUANT: CPT | Performed by: FAMILY MEDICINE

## 2023-06-20 PROCEDURE — 86039 ANTINUCLEAR ANTIBODIES (ANA): CPT | Performed by: FAMILY MEDICINE

## 2023-06-20 PROCEDURE — 85652 RBC SED RATE AUTOMATED: CPT | Performed by: FAMILY MEDICINE

## 2023-06-20 PROCEDURE — 86200 CCP ANTIBODY: CPT | Performed by: FAMILY MEDICINE

## 2023-06-20 PROCEDURE — 99214 OFFICE O/P EST MOD 30 MIN: CPT | Performed by: FAMILY MEDICINE

## 2023-06-20 RX ORDER — AMANTADINE HYDROCHLORIDE 100 MG/1
CAPSULE, GELATIN COATED ORAL
Qty: 84 CAPSULE | Refills: 11 | Status: ON HOLD | OUTPATIENT
Start: 2023-06-20 | End: 2023-09-26

## 2023-06-20 RX ORDER — PREDNISONE 20 MG/1
20 TABLET ORAL DAILY
Qty: 30 TABLET | Refills: 1 | Status: SHIPPED | OUTPATIENT
Start: 2023-06-20 | End: 2023-07-11

## 2023-06-20 ASSESSMENT — PAIN SCALES - GENERAL: PAINLEVEL: EXTREME PAIN (9)

## 2023-06-20 NOTE — PROGRESS NOTES
Assessment & Plan     Type 2 diabetes mellitus without complication, with long-term current use of insulin (H)  Laboratory studies will be checked today including a BMP and A1c to assess control.  It has been stable in the past.  - Basic metabolic panel  (Ca, Cl, CO2, Creat, Gluc, K, Na, BUN); Future  - Hemoglobin A1c; Future  - Basic metabolic panel  (Ca, Cl, CO2, Creat, Gluc, K, Na, BUN)  - Hemoglobin A1c    Myalgia  I would like to recheck inflammatory markers as well as check for a couple autoimmune conditions.  Feedback I get regarding the trial of prednisone is conflicting.  Nena says that her leg pain has improved significantly but the staff at the facility reports that she continues to need a fair amount of assistance.  I have extended the course of prednisone at this time and schedule a follow-up for her in about 3 weeks.  - CBC with platelets and differential; Future  - ESR: Erythrocyte sedimentation rate; Future  - CRP, inflammation; Future  - Cyclic Citrullinated Peptide Antibody IgG; Future  - Rheumatoid factor; Future  - Anti Nuclear Mary IgG by IFA with Reflex; Future  - predniSONE (DELTASONE) 20 MG tablet; Take 1 tablet (20 mg) by mouth daily  - CBC with platelets and differential  - ESR: Erythrocyte sedimentation rate  - CRP, inflammation  - Cyclic Citrullinated Peptide Antibody IgG  - Rheumatoid factor  - Anti Nuclear Mary IgG by IFA with Reflex    Rib pain on right side  Many of the qualities of this pain suggest musculoskeletal cause.  Staff is pretty confident that there was no preceding rash in the area to suggest a postherpetic neuralgia.  Her diabetes has been under good control but I suppose that she could have some type of neuropathy as a cause of this pain.  Previous ER note, imaging reports including but not limited to CT scan of the abdomen and pelvis, chest x-ray were reviewed as well as laboratory testing.  I would like to order a CT chest to complete a more thorough imaging  evaluation.  No changes were made to regard to her medications for this.  - CT Chest w/o Contrast; Future    Morbid obesity (H)  Her son who also has diabetes and obesity was recently put on a medication for weight loss and suggested that his mother consider this.  We talked briefly today about medication options including but not limited to Ozempic, Wegovy, and Mounjaro.  Any 1 of these could be an option for her which may allow discontinuation of the Trulicity.  Follow-up as scheduled in 3 weeks and hoping that [facility] pharmacy can be available for that discussion.                   Albino Rainey MD  [facility]    Surjit Barrett is a 63 year old, presenting for the following health issues:  Follow Up        6/20/2023     9:54 AM   Additional Questions   Roomed by Karma Kerns   Accompanied by Praveena friend     History of Present Illness       Reason for visit:  R side pain, prednisone    She eats 2-3 servings of fruits and vegetables daily.She consumes 1 sweetened beverage(s) daily.She exercises with enough effort to increase her heart rate 9 or less minutes per day.  She exercises with enough effort to increase her heart rate 7 days per week.   She is taking medications regularly.         Patient was here today for follow-up.  She is accompanied by staff from her facility.  She did start the prednisone and Nena reports to me that her leg pain is better at this time.  Staff seems a little bit more skeptical of this and she is still continuing to need a fair amount of assistance at the facility.  Nena requests physical therapy and they state that is available to her at the facility but she does not participate in it.  Her exercise is walking when she gets to the day program that she goes to.    She continues to have pretty significant pain in the right posterior rib area.  Staff report that this developed after she slept on a small bed while visiting her son and it could be  "up to 4 or 5 months ago.  Nena cannot really provide much good history around onset.  She was seen at the emergency room on June 5 and those records were again reviewed.  She reports that the pain is aggravated by movement, coughing, sneezing, and deep breathing.  She describes it as a very sharp pain.  She localizes it to her mid back on the right side and the area of maximal tenderness on exam today does correspond to some of the lower posterior ribs.  No rash has been present in that area per the patient and facility staff.  She was prescribed diclofenac gel but has not been using that so unclear regarding its effectiveness.      Review of Systems   Constitutional, HEENT, cardiovascular, pulmonary, gi and gu systems are negative, except as otherwise noted.      Objective    /82   Pulse 66   Temp 96.8  F (36  C) (Temporal)   Resp 16   Ht 1.547 m (5' 0.91\")   Wt 108.4 kg (239 lb)   LMP 01/06/2015 (Exact Date)   SpO2 98%   BMI 45.30 kg/m    Body mass index is 45.3 kg/m .  Physical Exam   GENERAL: healthy, alert, no distress, obese and exaggerated pain behaviors when trying to reach back and demonstrate where it hurts on the right side.  EYES: Eyes grossly normal to inspection, PERRL and conjunctivae and sclerae normal  RESP: lungs clear to auscultation - no rales, rhonchi or wheezes  RESP: There is no rash.  The area of maximal pain seems to correspond to posterior ribs in the mid scapular line probably about 2-3 ribs below the lower border of the scapula.  CV: regular rate and rhythm, normal S1 S2, no S3 or S4, no murmur, click or rub, no peripheral edema and peripheral pulses strong  SKIN: no suspicious lesions or rashes  NEURO: Normal strength and tone, mentation intact and speech normal  PSYCH: mentation appears normal, affect normal/bright    Admission on 06/05/2023, Discharged on 06/05/2023   Component Date Value Ref Range Status     Sodium 06/05/2023 137  136 - 145 mmol/L Final     Potassium " 06/05/2023 4.5  3.4 - 5.3 mmol/L Final     Chloride 06/05/2023 106  98 - 107 mmol/L Final     Carbon Dioxide (CO2) 06/05/2023 20 (L)  22 - 29 mmol/L Final     Anion Gap 06/05/2023 11  7 - 15 mmol/L Final     Urea Nitrogen 06/05/2023 18.4  8.0 - 23.0 mg/dL Final     Creatinine 06/05/2023 1.16 (H)  0.51 - 0.95 mg/dL Final     Calcium 06/05/2023 9.4  8.8 - 10.2 mg/dL Final     Glucose 06/05/2023 222 (H)  70 - 99 mg/dL Final     Alkaline Phosphatase 06/05/2023 71  35 - 104 U/L Final     AST 06/05/2023 15  10 - 35 U/L Final     ALT 06/05/2023 23  10 - 35 U/L Final     Protein Total 06/05/2023 7.1  6.4 - 8.3 g/dL Final     Albumin 06/05/2023 4.1  3.5 - 5.2 g/dL Final     Bilirubin Total 06/05/2023 0.3  <=1.2 mg/dL Final     GFR Estimate 06/05/2023 53 (L)  >60 mL/min/1.73m2 Final    eGFR calculated using 2021 CKD-EPI equation.     Color Urine 06/05/2023 Straw  Colorless, Straw, Light Yellow, Yellow Final     Appearance Urine 06/05/2023 Clear  Clear Final     Glucose Urine 06/05/2023 Negative  Negative mg/dL Final     Bilirubin Urine 06/05/2023 Negative  Negative Final     Ketones Urine 06/05/2023 Negative  Negative mg/dL Final     Specific Gravity Urine 06/05/2023 1.011  1.003 - 1.035 Final     Blood Urine 06/05/2023 Negative  Negative Final     pH Urine 06/05/2023 7.0  5.0 - 7.0 Final     Protein Albumin Urine 06/05/2023 Negative  Negative mg/dL Final     Urobilinogen Urine 06/05/2023 Normal  Normal, 2.0 mg/dL Final     Nitrite Urine 06/05/2023 Negative  Negative Final     Leukocyte Esterase Urine 06/05/2023 Negative  Negative Final     Bacteria Urine 06/05/2023 Few (A)  None Seen /HPF Final     RBC Urine 06/05/2023 1  <=2 /HPF Final     WBC Urine 06/05/2023 1  <=5 /HPF Final     Squamous Epithelials Urine 06/05/2023 2 (H)  <=1 /HPF Final     Lactic Acid 06/05/2023 2.0  0.7 - 2.0 mmol/L Final     Culture 06/05/2023 No Growth   Final     WBC Count 06/05/2023 5.0  4.0 - 11.0 10e3/uL Final     RBC Count 06/05/2023 3.12 (L)   3.80 - 5.20 10e6/uL Final     Hemoglobin 06/05/2023 10.1 (L)  11.7 - 15.7 g/dL Final     Hematocrit 06/05/2023 32.2 (L)  35.0 - 47.0 % Final     MCV 06/05/2023 103 (H)  78 - 100 fL Final     MCH 06/05/2023 32.4  26.5 - 33.0 pg Final     MCHC 06/05/2023 31.4 (L)  31.5 - 36.5 g/dL Final     RDW 06/05/2023 13.4  10.0 - 15.0 % Final     Platelet Count 06/05/2023 212  150 - 450 10e3/uL Final     % Neutrophils 06/05/2023 41  % Final     % Lymphocytes 06/05/2023 45  % Final     % Monocytes 06/05/2023 10  % Final     % Eosinophils 06/05/2023 2  % Final     % Basophils 06/05/2023 1  % Final     % Immature Granulocytes 06/05/2023 1  % Final     NRBCs per 100 WBC 06/05/2023 0  <1 /100 Final     Absolute Neutrophils 06/05/2023 2.1  1.6 - 8.3 10e3/uL Final     Absolute Lymphocytes 06/05/2023 2.3  0.8 - 5.3 10e3/uL Final     Absolute Monocytes 06/05/2023 0.5  0.0 - 1.3 10e3/uL Final     Absolute Eosinophils 06/05/2023 0.1  0.0 - 0.7 10e3/uL Final     Absolute Basophils 06/05/2023 0.1  0.0 - 0.2 10e3/uL Final     Absolute Immature Granulocytes 06/05/2023 0.0  <=0.4 10e3/uL Final     Absolute NRBCs 06/05/2023 0.0  10e3/uL Final

## 2023-06-20 NOTE — PATIENT INSTRUCTIONS
Continue prednisone 20 mg daily, new script sent to pharmacy.  Schedule a CT of the chest, you may call [phone] to schedule.  Follow up in 3 weeks as scheduled.

## 2023-06-21 DIAGNOSIS — M81.0 OSTEOPOROSIS WITHOUT CURRENT PATHOLOGICAL FRACTURE, UNSPECIFIED OSTEOPOROSIS TYPE: ICD-10-CM

## 2023-06-21 DIAGNOSIS — E78.5 HYPERLIPIDEMIA LDL GOAL <100: ICD-10-CM

## 2023-06-21 LAB
ANA PAT SER IF-IMP: ABNORMAL
ANA SER QL IF: POSITIVE
ANA TITR SER IF: ABNORMAL {TITER}
CCP AB SER IA-ACNC: <0.4 U/ML
RHEUMATOID FACT SER NEPH-ACNC: 13 IU/ML

## 2023-06-21 RX ORDER — ATORVASTATIN CALCIUM 80 MG/1
TABLET, FILM COATED ORAL
Qty: 90 TABLET | Refills: 3 | Status: SHIPPED | OUTPATIENT
Start: 2023-06-21 | End: 2024-05-21

## 2023-06-21 NOTE — TELEPHONE ENCOUNTER
"Requested Prescriptions   Pending Prescriptions Disp Refills     atorvastatin (LIPITOR) 80 MG tablet [Pharmacy Med Name: Atorvastatin Calcium 80MG TABS] 28 tablet      Sig: TAKE 1 TABLET BY MOUTH DAILY       Statins Protocol Passed - 6/21/2023  3:56 PM        Passed - LDL on file in past 12 months     Recent Labs   Lab Test 08/17/22  1056   LDL 79             Passed - No abnormal creatine kinase in past 12 months     Recent Labs   Lab Test 05/23/23  1236                   Passed - Recent (12 mo) or future (30 days) visit within the authorizing provider's specialty     Patient has had an office visit with the authorizing provider or a provider within the authorizing providers department within the previous 12 mos or has a future within next 30 days. See \"Patient Info\" tab in inbasket, or \"Choose Columns\" in Meds & Orders section of the refill encounter.              Passed - Medication is active on med list        Passed - Patient is age 18 or older        Passed - No active pregnancy on record        Passed - No positive pregnancy test in past 12 months           calcium carbonate (OS-ALLEN) 1500 (600 Ca) MG tablet [Pharmacy Med Name: Calcium Carbonate 600MG TABS] 28 tablet      Sig: TAKE 1 TABLET BY MOUTH DAILY       Vitamin Supplements (Adult) Protocol Passed - 6/21/2023  3:56 PM        Passed - High dose Vitamin D not ordered        Passed - Recent (12 mo) or future (30 days) visit within the authorizing provider's specialty     Patient has had an office visit with the authorizing provider or a provider within the authorizing providers department within the previous 12 mos or has a future within next 30 days. See \"Patient Info\" tab in inbasket, or \"Choose Columns\" in Meds & Orders section of the refill encounter.              Passed - Medication is active on med list           Prescription approved per [facility] Refill Protocol.  Ulices Jasmine RN  [facility]     "

## 2023-06-22 ENCOUNTER — MYC MEDICAL ADVICE (OUTPATIENT)
Dept: FAMILY MEDICINE | Facility: CLINIC | Age: 63
End: 2023-06-22

## 2023-06-22 ENCOUNTER — OFFICE VISIT (OUTPATIENT)
Dept: ORTHOPEDICS | Facility: CLINIC | Age: 63
End: 2023-06-22
Payer: COMMERCIAL

## 2023-06-22 ENCOUNTER — ANCILLARY PROCEDURE (OUTPATIENT)
Dept: GENERAL RADIOLOGY | Facility: CLINIC | Age: 63
End: 2023-06-22
Attending: FAMILY MEDICINE
Payer: COMMERCIAL

## 2023-06-22 VITALS
BODY MASS INDEX: 46.92 KG/M2 | SYSTOLIC BLOOD PRESSURE: 119 MMHG | WEIGHT: 239 LBS | DIASTOLIC BLOOD PRESSURE: 76 MMHG | HEIGHT: 60 IN

## 2023-06-22 DIAGNOSIS — M25.562 CHRONIC PAIN OF LEFT KNEE: ICD-10-CM

## 2023-06-22 DIAGNOSIS — M25.562 BILATERAL KNEE PAIN: ICD-10-CM

## 2023-06-22 DIAGNOSIS — G89.29 CHRONIC PAIN OF LEFT KNEE: ICD-10-CM

## 2023-06-22 DIAGNOSIS — M81.0 OSTEOPOROSIS WITHOUT CURRENT PATHOLOGICAL FRACTURE, UNSPECIFIED OSTEOPOROSIS TYPE: ICD-10-CM

## 2023-06-22 DIAGNOSIS — M17.0 OSTEOARTHRITIS OF PATELLOFEMORAL JOINTS OF BOTH KNEES: Primary | ICD-10-CM

## 2023-06-22 DIAGNOSIS — M25.561 BILATERAL KNEE PAIN: ICD-10-CM

## 2023-06-22 PROCEDURE — 20610 DRAIN/INJ JOINT/BURSA W/O US: CPT | Mod: LT | Performed by: FAMILY MEDICINE

## 2023-06-22 PROCEDURE — 73562 X-RAY EXAM OF KNEE 3: CPT | Mod: TC | Performed by: INTERNAL MEDICINE

## 2023-06-22 PROCEDURE — 99213 OFFICE O/P EST LOW 20 MIN: CPT | Mod: 25 | Performed by: FAMILY MEDICINE

## 2023-06-22 RX ORDER — ALENDRONATE SODIUM 70 MG/1
70 TABLET ORAL
Qty: 12 TABLET | Refills: 3 | Status: ON HOLD | OUTPATIENT
Start: 2023-06-22 | End: 2023-09-26

## 2023-06-22 RX ADMIN — TRIAMCINOLONE ACETONIDE 40 MG: 40 INJECTION, SUSPENSION INTRA-ARTICULAR; INTRAMUSCULAR at 14:00

## 2023-06-22 RX ADMIN — LIDOCAINE HYDROCHLORIDE 4 ML: 10 INJECTION, SOLUTION INFILTRATION; PERINEURAL at 14:00

## 2023-06-22 NOTE — PATIENT INSTRUCTIONS
Thank you for choosing [facility]    DR MICHEL'S CLINIC LOCATIONS  [place] [address] [address] [facility], [address]th [place]   [place], [address] [place] [address]   [phone]       APPOINTMENTS: [phone]    CARE QUESTIONS: [phone],    BILLING QUESTIONS: [phone]    FAX NUMBER: [phone]        Follow up: Please schedule a follow up as needed.       1. Bilateral knee pain    2. Chronic pain of left knee            Steroid Injection Information:    A corticosteroid injection was administered at your visit today.  The area of injection may be sore, slightly swollen for 1-2 days afterward.  Immediately after injection, you may have an area of numbness, which is caused by the local anesthetic, lidocaine (similar to novacaine) in the shot.  This medicine will wear off in about 4 hours.  In addition to the lidocaine, a steroid medication was injected, called triamcinolone acetate.  This medication is intended to provide long-acting antiinflammatory / pain relief.  It may take 2-5 days for this medication to provide noticeable relief.      After an injection, Dr. Hernandez recommends:    Protecting the area wearing a bandage or gauze pad for at least 24 hours.    Using ice; ice bag or frozen vegetables over the injection site every one to two hours when able (for 15 minutes at a time).      Avoid any strenuous activity even if the knee is already feeling better immediately afterward. Light stretching is encouraged but refrain from home exercise program and increasing activity level for about 48 hours.    Avoid soaking in a hot tub, bath, or pool for 48 hours after injection. Showering is fine!    Watch for signs of infection, including increasing pain, redness and swelling that last more than 48 hours after injection.

## 2023-06-22 NOTE — TELEPHONE ENCOUNTER
Per [employer] message requesting refill of medications, Myrbetriq 25mg Tab, Alendronate 70mg Tab. Urology sent refill for myrbetriq and wants pt to follow up with them prior to PCP office managing medication.     [employer] sent relaying to make appointment with urology.    Thanks!  Manjinder Cook RN   [facility]

## 2023-06-22 NOTE — PROGRESS NOTES
CHIEF COMPLAINT:  Pain of the Left Knee       HISTORY OF PRESENT ILLNESS  Ms. Tang is a pleasant 63 year old female who presents to clinic today with bilateral knee pain.  Nena explains that dull aching in her left knee and right knee giver difficulties walking.     Onset: sudden  Location: bilateral knees  Quality:  aching and dull  Duration: 2 months   Severity: 9/10 at worst  Timing:intermittent episodes   Modifying factors:  resting and non-use makes it better, movement and use makes it worse.   Associated signs & symptoms: pain  Previous similar pain: Yes  Treatments to date:rest    Additional history: as documented    Review of Systems:    Have you recently had a a fever, chills, weight loss? No    Do you have any vision problems? No    Do you have any chest pain or edema? No    Do you have any shortness of breath or wheezing?  No    Do you have stomach problems? No    Do you have any numbness or focal weakness? No    Do you have diabetes? No    Do you have problems with bleeding or clotting? No    Do you have an rashes or other skin lesions? No      MEDICAL HISTORY  Patient Active Problem List   Diagnosis     Osteopenia     Vitamin B12 deficiency without anemia     Hyperlipidemia LDL goal <100     Rotator cuff syndrome     Type 2 diabetes mellitus with mild nonproliferative retinopathy (H)     Illiterate     Irritable bowel syndrome     overweight - BMI >35     Takotsubo cardiomyopathy     CAD (coronary artery disease)     Restless legs syndrome (RLS)     CINDY (obstructive sleep apnea)- mild AHI 10.3     Verbal auditory hallucination     Chronic low back pain     Schizoaffective disorder, depressive type (H)     Migraine headache     Essential hypertension with goal blood pressure less than 130/80     Health Care Home     Lumbago     Cervicalgia     Cocaine abuse, episodic (H)     Suicidal ideation     Esophageal reflux     Mild nonproliferative diabetic retinopathy (H)     Tardive dyskinesia     Alcohol  use     Left cataract     Falls frequently     History of uterine cancer     Psychophysiological insomnia     Dysuria     Asymptomatic postmenopausal status     Abdominal pain, right lower quadrant     Infectious encephalopathy     Non-intractable vomiting with nausea     Thoughts of self harm     Gastroenteritis     Posttraumatic stress disorder     Cervical cancer screening     Type 2 diabetes mellitus with stage 3 chronic kidney disease, with long-term current use of insulin (H)     Positive AIDA (antinuclear antibody)     Hyperglycemia     Pneumonia     Depression with suicidal ideation     Mixed stress and urge urinary incontinence     Chronic pain syndrome     Fibromyalgia     Type 2 diabetes mellitus without complication, with long-term current use of insulin (H)     Dehydration     Hypoglycemia     Acute kidney injury (H)     Diarrhea, unspecified type     2019 novel coronavirus disease (COVID-19)     Chronic pain     Has poorly balanced diet     History of substance abuse (H)     History of suicidal behavior     Acute pyelonephritis     Localized edema     Fatigue, unspecified type     Vitamin D deficiency     DDD (degenerative disc disease), lumbosacral     Impingement syndrome of shoulder region, right       Current Outpatient Medications   Medication Sig Dispense Refill     acetaminophen (TYLENOL) 500 MG tablet Take 1000mg in the morning and 1000g at night. Take 1 additional dose of 500-1000mg mid-day as needed 200 tablet 11     Alcohol Swabs (EASY TOUCH ALCOHOL PREP MEDIUM) 70 % PADS Apply 1 Units topically 8 times daily 400 each 11     alendronate (FOSAMAX) 70 MG tablet Take 1 tablet (70 mg) by mouth every 7 days Wednesdays - Take with water, 30 minutes before breakfast. Do not take with any other medicines. Sit upright for 30 minutes after taking. 12 tablet 3     amantadine (SYMMETREL) 100 MG capsule TAKE 1 CAPSULE BY MOUTH EVERY MORNING AND TAKE 2 CAPSULES BY MOUTH EVERY EVENING 84 capsule 11      aspirin (ASPIRIN LOW DOSE) 81 MG EC tablet Take 1 tablet (81 mg) by mouth daily 28 tablet 11     atorvastatin (LIPITOR) 80 MG tablet TAKE 1 TABLET BY MOUTH DAILY 90 tablet 3     blood glucose (NO BRAND SPECIFIED) test strip Use to test blood sugar 10 times daily or as directed. 100 strip 11     calcium carbonate (OS-ALLEN) 1500 (600 Ca) MG tablet TAKE 1 TABLET BY MOUTH DAILY 90 tablet 3     clonazePAM (KLONOPIN) 0.5 MG tablet Take 1 tablet (0.5 mg) by mouth nightly as needed for anxiety 28 tablet 5     Continuous Blood Gluc Sensor (DEXCOM G6 SENSOR) MISC Change every 10 days. 9 each 2     Continuous Blood Gluc Transmit (DEXCOM G6 TRANSMITTER) MISC Change every 3 months. 1 each 3     diclofenac (VOLTAREN) 1 % topical gel Apply 4 grams to painful area at bedtime. May use an additional 3 doses during the day as needed 100 g 1     docusate sodium (COLACE) 100 MG capsule Take 1 capsule (100 mg) by mouth 2 times daily 40 capsule 0     escitalopram (LEXAPRO) 20 MG tablet TAKE 1/2 TABLET BY MOUTH DAILY 14 tablet 11     gabapentin (NEURONTIN) 300 MG capsule Take 1 capsule (300 mg) by mouth At Bedtime 30 capsule 11     hydrOXYzine (VISTARIL) 25 MG capsule Take 1 capsule (25 mg) by mouth every 4 hours as needed for anxiety May take nightly for sleep 60 capsule 3     insulin glargine (LANTUS PEN) 100 UNIT/ML pen Inject 33 Units Subcutaneous 2 times daily 60 mL 11     insulin lispro (HUMALOG KWIKPEN) 100 UNIT/ML (1 unit dial) KWIKPEN TAKE 8 UNITS THREE TIMES A DAY WITH MEALS PLUS SLIDING SCALE . ADD 3 UNITS FOR EVERY 50MG/DL OVER 150 WITH MAX 45 mL 11     insulin pen needle (NOVOFINE 30) 30G X 8 MM miscellaneous USE 6 DAILY OR AS DIRECTED 400 each 3     losartan (COZAAR) 100 MG tablet TAKE 1 TABLET BY MOUTH DAILY 28 tablet 11     magnesium 250 MG tablet Take 1 tablet (250 mg) by mouth At Bedtime 30 tablet 11     metoprolol succinate ER (TOPROL XL) 50 MG 24 hr tablet TAKE 1 TABLET BY MOUTH IN THE MORNING AND TAKE 2 TABLETS AT  BEDTIME 84 tablet 11     MYRBETRIQ 25 MG 24 hr tablet TAKE 1 TABLET BY MOUTH DAILY 28 tablet 1     nystatin (MYCOSTATIN) 416574 UNIT/GM external powder Apply topically 2 times daily as needed 45 g 1     ondansetron (ZOFRAN) 4 MG tablet Take 1 tablet (4 mg) by mouth every 8 hours as needed for nausea 10 tablet 1     OneTouch Delica Lancets 33G MISC 1 each as needed (for back up to Dexcom) Use to test blood sugars 1-2 times daily as directed. 100 each 3     order for DME Equipment being ordered: Depends. 30 each 4     order for DME Equipment being ordered: CPAP Supplies. 1 each 0     paliperidone ER (INVEGA) 9 MG 24 hr tablet TAKE 1 TABLET BY MOUTH AT BEDTIME 28 tablet 3     pantoprazole (PROTONIX) 40 MG EC tablet TAKE 1 TABLET (40 MG) BY MOUTH DAILY 28 tablet 11     prazosin (MINIPRESS) 1 MG capsule Take 1 mg by mouth At Bedtime       predniSONE (DELTASONE) 20 MG tablet Take 1 tablet (20 mg) by mouth daily 30 tablet 1     QUEtiapine (SEROQUEL) 100 MG tablet Take 100 mg by mouth At Bedtime       senna-docusate (SENOKOT-S/PERICOLACE) 8.6-50 MG tablet Take 1 tablet daily and take an extra tablet with constipation. 60 tablet 10     thin (NO BRAND SPECIFIED) lancets Use with lanceting device. To accompany: Blood Glucose Monitor Brands: per insurance. 100 each 6     traZODone (DESYREL) 100 MG tablet Take 1 tablet (100 mg) by mouth At Bedtime 90 tablet 3     TRULICITY 3 MG/0.5ML SOPN INJECT 3MG SUBCUTANEOUSLY EVERY 7 DAYS 2 mL 3     vitamin C (ASCORBIC ACID) 500 MG tablet TAKE 2 TABLETS BY MOUTH IN THE MORNING AND TAKE 2 TABLETS BY MOUTH IN THE EVENING 180 tablet 3     zinc oxide (DESITIN) 20 % external ointment Apply topically 3 times daily as needed for irritation (barrier cream) 60 g 3       Allergies   Allergen Reactions     Imidazole Antifungals Hives     Tolerates diflucan     Ketoprofen Itching     Pruritis to topical     Lisinopril Hives     Metformin Other (See Comments)     Patient hospitalized for lactic acidosis  - admitting provider suspectd caused by metformin     Metronidazole Hives     Posaconazole Hives     Tolerates diflucan       Family History   Problem Relation Age of Onset     Cancer Mother         BLADDER     Respiratory Mother         COPD     Gastrointestinal Disease Mother         CIRRHOSIS OF LI BOLIVAR     Alcohol/Drug Mother      Diabetes Mother      Hypertension Mother      Lipids Mother      C.A.D. Mother      Glaucoma Mother      Alcohol/Drug Sister      Mental Illness Sister      Alcohol/Drug Sister      Psychotic Disorder Sister      Cancer Maternal Grandmother         UNKNOWN TYPE     Cancer Brother         COLON     Cancer - colorectal Brother         IN HIS LATE 30S     Alcohol/Drug Brother          OF HEROIN OVERDOSE AT AGE 22 YRS     Macular Degeneration No family hx of        Additional medical/Social/Surgical histories reviewed in [facility] and updated as appropriate.       PHYSICAL EXAM  /76   Ht 1.524 m (5')   Wt 108.4 kg (239 lb)   LMP 2015 (Exact Date)   BMI 46.68 kg/m      General  - normal appearance, in no obvious distress  Musculoskeletal - Bilateral knee  - stance: Using walker, shortened stride length, stooped posture.  - inspection: no swelling or effusion  - palpation: tender medial and lateral patellar facets  - ROM: 135 degrees flexion, -5 degrees extension, not painful, crepitus with weight-bearing flexion  - strength: 5/5 in flexion, 5/5 in extension  - special tests:  (-) anterior drawer  (-) varus at 0 and 30 degrees flexion  (-) valgus at 0 and 30 degrees flexion  (+) patellar compression  (-) patellar apprehension  Neuro  - no sensory or motor deficit, grossly normal coordination, normal muscle tone      IMAGING : XR bilateral knee 3 views. Final results and radiologist's interpretation, available in the [facility] health record. Images were reviewed with the patient/family members in the office today. My personal interpretation of the performed imaging is spurring and  early arthritic changes of patellofemoral joints.     ASSESSMENT & PLAN  Ms. Tang is a 63 year old year old female who presents to clinic today with ongoing left >right knee pain in the setting of generalized myalgias.      Diagnosis: Patellofemoral osteoarthritis of bilateral knees.    She is working with PCP, potentially rheumatology on diffuse pains, however I suspect anterior knee pain relating to ambulation and stairs is at least partially related to degenerative changes at patellofemoral joints.      Discussed treatments including PT, injections, weight management, low impact exercise and analgesic oral medications.  She wishes to pursue corticosteroid injections after discussion. Because she is taking prednisone Q AM and has diabetes mellitus, we agreed to start with one knee injection today to avoid serious hyperglycemia.  Fortunately A1c controlled well.    We can repeat her right knee injection in the next 2-3 weeks if left is providing satisfactory relief.  Continue rolling walker for pain relief and stability.  Continue workup with PCP and potentially rheumatology regarding myalgias and continue prednisone course as directed by PCP. Hold prednisone dose tomorrow due to corticosteroid injection if sugars >200.    PROCEDURE    Left Knee Injection - Intraarticular  The patient was informed of the risks and the benefits of the procedure and a written consent was signed.  The patient s left knee was prepped with chlorhexidine in sterile fashion.   40 mg of triamcinolone suspension was drawn up into a 5 mL syringe with 4 mL of 1% lidocaine.  Injection was performed using substerile technique.  A 1.5-inch 22-gauge needle was used to enter the lateral aspect of the left knee.  Injection performed successfully without difficulty.  There were no complications. The patient tolerated the procedure well. There was negligible bleeding.   The patient was instructed to ice the knee upon leaving clinic and refrain  from overuse over the next 3 days.   The patient was instructed to call or go to the emergency room with any unusual pain, swelling, redness, or if otherwise concerned.  A follow up appointment will be scheduled to evaluate response to the injection, and to assess range of motion and pain.      It was a pleasure seeing Nena today.    Luiz Hernandez DO, [facility]  Primary Care Sports Medicine

## 2023-06-22 NOTE — LETTER
6/22/2023         RE: Nena Tang  [address] [place] [address]        Dear Colleague,    Thank you for referring your patient, Nena Tang, to the [facility] [place]. Please see a copy of my visit note below.    CHIEF COMPLAINT:  Pain of the Left Knee       HISTORY OF PRESENT ILLNESS  Ms. Tang is a pleasant 63 year old female who presents to clinic today with bilateral knee pain.  Nena explains that dull aching in her left knee and right knee giver difficulties walking.     Onset: sudden  Location: bilateral knees  Quality:  aching and dull  Duration: 2 months   Severity: 9/10 at worst  Timing:intermittent episodes   Modifying factors:  resting and non-use makes it better, movement and use makes it worse.   Associated signs & symptoms: pain  Previous similar pain: Yes  Treatments to date:rest    Additional history: as documented    Review of Systems:    Have you recently had a a fever, chills, weight loss? No    Do you have any vision problems? No    Do you have any chest pain or edema? No    Do you have any shortness of breath or wheezing?  No    Do you have stomach problems? No    Do you have any numbness or focal weakness? No    Do you have diabetes? No    Do you have problems with bleeding or clotting? No    Do you have an rashes or other skin lesions? No      MEDICAL HISTORY  Patient Active Problem List   Diagnosis     Osteopenia     Vitamin B12 deficiency without anemia     Hyperlipidemia LDL goal <100     Rotator cuff syndrome     Type 2 diabetes mellitus with mild nonproliferative retinopathy (H)     Illiterate     Irritable bowel syndrome     overweight - BMI >35     Takotsubo cardiomyopathy     CAD (coronary artery disease)     Restless legs syndrome (RLS)     CINDY (obstructive sleep apnea)- mild AHI 10.3     Verbal auditory hallucination     Chronic low back pain     Schizoaffective disorder, depressive type (H)     Migraine headache      Essential hypertension with goal blood pressure less than 130/80     Health Care Home     Lumbago     Cervicalgia     Cocaine abuse, episodic (H)     Suicidal ideation     Esophageal reflux     Mild nonproliferative diabetic retinopathy (H)     Tardive dyskinesia     Alcohol use     Left cataract     Falls frequently     History of uterine cancer     Psychophysiological insomnia     Dysuria     Asymptomatic postmenopausal status     Abdominal pain, right lower quadrant     Infectious encephalopathy     Non-intractable vomiting with nausea     Thoughts of self harm     Gastroenteritis     Posttraumatic stress disorder     Cervical cancer screening     Type 2 diabetes mellitus with stage 3 chronic kidney disease, with long-term current use of insulin (H)     Positive AIDA (antinuclear antibody)     Hyperglycemia     Pneumonia     Depression with suicidal ideation     Mixed stress and urge urinary incontinence     Chronic pain syndrome     Fibromyalgia     Type 2 diabetes mellitus without complication, with long-term current use of insulin (H)     Dehydration     Hypoglycemia     Acute kidney injury (H)     Diarrhea, unspecified type     2019 novel coronavirus disease (COVID-19)     Chronic pain     Has poorly balanced diet     History of substance abuse (H)     History of suicidal behavior     Acute pyelonephritis     Localized edema     Fatigue, unspecified type     Vitamin D deficiency     DDD (degenerative disc disease), lumbosacral     Impingement syndrome of shoulder region, right       Current Outpatient Medications   Medication Sig Dispense Refill     acetaminophen (TYLENOL) 500 MG tablet Take 1000mg in the morning and 1000g at night. Take 1 additional dose of 500-1000mg mid-day as needed 200 tablet 11     Alcohol Swabs (EASY TOUCH ALCOHOL PREP MEDIUM) 70 % PADS Apply 1 Units topically 8 times daily 400 each 11     alendronate (FOSAMAX) 70 MG tablet Take 1 tablet (70 mg) by mouth every 7 days Wednesdays - Take  with water, 30 minutes before breakfast. Do not take with any other medicines. Sit upright for 30 minutes after taking. 12 tablet 3     amantadine (SYMMETREL) 100 MG capsule TAKE 1 CAPSULE BY MOUTH EVERY MORNING AND TAKE 2 CAPSULES BY MOUTH EVERY EVENING 84 capsule 11     aspirin (ASPIRIN LOW DOSE) 81 MG EC tablet Take 1 tablet (81 mg) by mouth daily 28 tablet 11     atorvastatin (LIPITOR) 80 MG tablet TAKE 1 TABLET BY MOUTH DAILY 90 tablet 3     blood glucose (NO BRAND SPECIFIED) test strip Use to test blood sugar 10 times daily or as directed. 100 strip 11     calcium carbonate (OS-ALLEN) 1500 (600 Ca) MG tablet TAKE 1 TABLET BY MOUTH DAILY 90 tablet 3     clonazePAM (KLONOPIN) 0.5 MG tablet Take 1 tablet (0.5 mg) by mouth nightly as needed for anxiety 28 tablet 5     Continuous Blood Gluc Sensor (DEXCOM G6 SENSOR) MISC Change every 10 days. 9 each 2     Continuous Blood Gluc Transmit (DEXCOM G6 TRANSMITTER) MISC Change every 3 months. 1 each 3     diclofenac (VOLTAREN) 1 % topical gel Apply 4 grams to painful area at bedtime. May use an additional 3 doses during the day as needed 100 g 1     docusate sodium (COLACE) 100 MG capsule Take 1 capsule (100 mg) by mouth 2 times daily 40 capsule 0     escitalopram (LEXAPRO) 20 MG tablet TAKE 1/2 TABLET BY MOUTH DAILY 14 tablet 11     gabapentin (NEURONTIN) 300 MG capsule Take 1 capsule (300 mg) by mouth At Bedtime 30 capsule 11     hydrOXYzine (VISTARIL) 25 MG capsule Take 1 capsule (25 mg) by mouth every 4 hours as needed for anxiety May take nightly for sleep 60 capsule 3     insulin glargine (LANTUS PEN) 100 UNIT/ML pen Inject 33 Units Subcutaneous 2 times daily 60 mL 11     insulin lispro (HUMALOG KWIKPEN) 100 UNIT/ML (1 unit dial) KWIKPEN TAKE 8 UNITS THREE TIMES A DAY WITH MEALS PLUS SLIDING SCALE . ADD 3 UNITS FOR EVERY 50MG/DL OVER 150 WITH MAX 45 mL 11     insulin pen needle (NOVOFINE 30) 30G X 8 MM miscellaneous USE 6 DAILY OR AS DIRECTED 400 each 3     losartan  (COZAAR) 100 MG tablet TAKE 1 TABLET BY MOUTH DAILY 28 tablet 11     magnesium 250 MG tablet Take 1 tablet (250 mg) by mouth At Bedtime 30 tablet 11     metoprolol succinate ER (TOPROL XL) 50 MG 24 hr tablet TAKE 1 TABLET BY MOUTH IN THE MORNING AND TAKE 2 TABLETS AT BEDTIME 84 tablet 11     MYRBETRIQ 25 MG 24 hr tablet TAKE 1 TABLET BY MOUTH DAILY 28 tablet 1     nystatin (MYCOSTATIN) 628809 UNIT/GM external powder Apply topically 2 times daily as needed 45 g 1     ondansetron (ZOFRAN) 4 MG tablet Take 1 tablet (4 mg) by mouth every 8 hours as needed for nausea 10 tablet 1     OneTouch Delica Lancets 33G MISC 1 each as needed (for back up to Dexcom) Use to test blood sugars 1-2 times daily as directed. 100 each 3     order for DME Equipment being ordered: Depends. 30 each 4     order for DME Equipment being ordered: CPAP Supplies. 1 each 0     paliperidone ER (INVEGA) 9 MG 24 hr tablet TAKE 1 TABLET BY MOUTH AT BEDTIME 28 tablet 3     pantoprazole (PROTONIX) 40 MG EC tablet TAKE 1 TABLET (40 MG) BY MOUTH DAILY 28 tablet 11     prazosin (MINIPRESS) 1 MG capsule Take 1 mg by mouth At Bedtime       predniSONE (DELTASONE) 20 MG tablet Take 1 tablet (20 mg) by mouth daily 30 tablet 1     QUEtiapine (SEROQUEL) 100 MG tablet Take 100 mg by mouth At Bedtime       senna-docusate (SENOKOT-S/PERICOLACE) 8.6-50 MG tablet Take 1 tablet daily and take an extra tablet with constipation. 60 tablet 10     thin (NO BRAND SPECIFIED) lancets Use with lanceting device. To accompany: Blood Glucose Monitor Brands: per insurance. 100 each 6     traZODone (DESYREL) 100 MG tablet Take 1 tablet (100 mg) by mouth At Bedtime 90 tablet 3     TRULICITY 3 MG/0.5ML SOPN INJECT 3MG SUBCUTANEOUSLY EVERY 7 DAYS 2 mL 3     vitamin C (ASCORBIC ACID) 500 MG tablet TAKE 2 TABLETS BY MOUTH IN THE MORNING AND TAKE 2 TABLETS BY MOUTH IN THE EVENING 180 tablet 3     zinc oxide (DESITIN) 20 % external ointment Apply topically 3 times daily as needed for  irritation (barrier cream) 60 g 3       Allergies   Allergen Reactions     Imidazole Antifungals Hives     Tolerates diflucan     Ketoprofen Itching     Pruritis to topical     Lisinopril Hives     Metformin Other (See Comments)     Patient hospitalized for lactic acidosis - admitting provider suspectd caused by metformin     Metronidazole Hives     Posaconazole Hives     Tolerates diflucan       Family History   Problem Relation Age of Onset     Cancer Mother         BLADDER     Respiratory Mother         COPD     Gastrointestinal Disease Mother         CIRRHOSIS OF LI BOLIVAR     Alcohol/Drug Mother      Diabetes Mother      Hypertension Mother      Lipids Mother      C.A.D. Mother      Glaucoma Mother      Alcohol/Drug Sister      Mental Illness Sister      Alcohol/Drug Sister      Psychotic Disorder Sister      Cancer Maternal Grandmother         UNKNOWN TYPE     Cancer Brother         COLON     Cancer - colorectal Brother         IN HIS LATE 30S     Alcohol/Drug Brother          OF HEROIN OVERDOSE AT AGE 22 YRS     Macular Degeneration No family hx of        Additional medical/Social/Surgical histories reviewed in EPIC and updated as appropriate.       PHYSICAL EXAM  /76   Ht 1.524 m (5')   Wt 108.4 kg (239 lb)   LMP 2015 (Exact Date)   BMI 46.68 kg/m      General  - normal appearance, in no obvious distress  Musculoskeletal - Bilateral knee  - stance: Using walker, shortened stride length, stooped posture.  - inspection: no swelling or effusion  - palpation: tender medial and lateral patellar facets  - ROM: 135 degrees flexion, -5 degrees extension, not painful, crepitus with weight-bearing flexion  - strength: 5/5 in flexion, 5/5 in extension  - special tests:  (-) anterior drawer  (-) varus at 0 and 30 degrees flexion  (-) valgus at 0 and 30 degrees flexion  (+) patellar compression  (-) patellar apprehension  Neuro  - no sensory or motor deficit, grossly normal coordination, normal muscle  tone      IMAGING : XR bilateral knee 3 views. Final results and radiologist's interpretation, available in the [facility] health record. Images were reviewed with the patient/family members in the office today. My personal interpretation of the performed imaging is spurring and early arthritic changes of patellofemoral joints.     ASSESSMENT & PLAN  Ms. Tang is a 63 year old year old female who presents to clinic today with ongoing left >right knee pain in the setting of generalized myalgias.      Diagnosis: Patellofemoral osteoarthritis of bilateral knees.    She is working with PCP, potentially rheumatology on diffuse pains, however I suspect anterior knee pain relating to ambulation and stairs is at least partially related to degenerative changes at patellofemoral joints.      Discussed treatments including PT, injections, weight management, low impact exercise and analgesic oral medications.  She wishes to pursue corticosteroid injections after discussion. Because she is taking prednisone Q AM and has diabetes mellitus, we agreed to start with one knee injection today to avoid serious hyperglycemia.  Fortunately A1c controlled well.    We can repeat her right knee injection in the next 2-3 weeks if left is providing satisfactory relief.  Continue rolling walker for pain relief and stability.  Continue workup with PCP and potentially rheumatology regarding myalgias and continue prednisone course as directed by PCP. Hold prednisone dose tomorrow due to corticosteroid injection if sugars >200.    PROCEDURE    Left Knee Injection - Intraarticular  The patient was informed of the risks and the benefits of the procedure and a written consent was signed.  The patient s left knee was prepped with chlorhexidine in sterile fashion.   40 mg of triamcinolone suspension was drawn up into a 5 mL syringe with 4 mL of 1% lidocaine.  Injection was performed using substerile technique.  A 1.5-inch 22-gauge needle was used to enter  the lateral aspect of the left knee.  Injection performed successfully without difficulty.  There were no complications. The patient tolerated the procedure well. There was negligible bleeding.   The patient was instructed to ice the knee upon leaving clinic and refrain from overuse over the next 3 days.   The patient was instructed to call or go to the emergency room with any unusual pain, swelling, redness, or if otherwise concerned.  A follow up appointment will be scheduled to evaluate response to the injection, and to assess range of motion and pain.      It was a pleasure seeing Nena today.    Luiz Hernandez DO, [facility]  Primary Care Sports Medicine      Again, thank you for allowing me to participate in the care of your patient.        Sincerely,        Luiz Hernandez DO

## 2023-06-25 ENCOUNTER — HOSPITAL ENCOUNTER (EMERGENCY)
Facility: CLINIC | Age: 63
Discharge: HOME | End: 2023-06-26
Attending: EMERGENCY MEDICINE | Admitting: EMERGENCY MEDICINE
Payer: COMMERCIAL

## 2023-06-25 ENCOUNTER — APPOINTMENT (OUTPATIENT)
Dept: CT IMAGING | Facility: CLINIC | Age: 63
End: 2023-06-25
Attending: EMERGENCY MEDICINE
Payer: COMMERCIAL

## 2023-06-25 DIAGNOSIS — R19.7 DIARRHEA, UNSPECIFIED TYPE: ICD-10-CM

## 2023-06-25 DIAGNOSIS — R10.10 PAIN OF UPPER ABDOMEN: ICD-10-CM

## 2023-06-25 DIAGNOSIS — R11.2 NAUSEA AND VOMITING, UNSPECIFIED VOMITING TYPE: ICD-10-CM

## 2023-06-25 LAB
BASE EXCESS BLDV CALC-SCNC: -2.3 MMOL/L (ref -7.7–1.9)
BASOPHILS # BLD AUTO: 0.1 10E3/UL (ref 0–0.2)
BASOPHILS NFR BLD AUTO: 1 %
EOSINOPHIL # BLD AUTO: 0 10E3/UL (ref 0–0.7)
EOSINOPHIL NFR BLD AUTO: 0 %
ERYTHROCYTE [DISTWIDTH] IN BLOOD BY AUTOMATED COUNT: 12.8 % (ref 10–15)
HCO3 BLDV-SCNC: 25 MMOL/L (ref 21–28)
HCT VFR BLD AUTO: 35.3 % (ref 35–47)
HGB BLD-MCNC: 11.4 G/DL (ref 11.7–15.7)
IMM GRANULOCYTES # BLD: 0.2 10E3/UL
IMM GRANULOCYTES NFR BLD: 2 %
LYMPHOCYTES # BLD AUTO: 2.5 10E3/UL (ref 0.8–5.3)
LYMPHOCYTES NFR BLD AUTO: 24 %
MCH RBC QN AUTO: 33 PG (ref 26.5–33)
MCHC RBC AUTO-ENTMCNC: 32.3 G/DL (ref 31.5–36.5)
MCV RBC AUTO: 102 FL (ref 78–100)
MONOCYTES # BLD AUTO: 0.8 10E3/UL (ref 0–1.3)
MONOCYTES NFR BLD AUTO: 8 %
NEUTROPHILS # BLD AUTO: 6.8 10E3/UL (ref 1.6–8.3)
NEUTROPHILS NFR BLD AUTO: 65 %
NRBC # BLD AUTO: 0 10E3/UL
NRBC BLD AUTO-RTO: 0 /100
O2/TOTAL GAS SETTING VFR VENT: 95 %
PCO2 BLDV: 52 MM HG (ref 40–50)
PH BLDV: 7.29 [PH] (ref 7.32–7.43)
PLATELET # BLD AUTO: 160 10E3/UL (ref 150–450)
PO2 BLDV: 23 MM HG (ref 25–47)
RBC # BLD AUTO: 3.45 10E6/UL (ref 3.8–5.2)
WBC # BLD AUTO: 10.3 10E3/UL (ref 4–11)

## 2023-06-25 PROCEDURE — 93005 ELECTROCARDIOGRAM TRACING: CPT

## 2023-06-25 PROCEDURE — 74177 CT ABD & PELVIS W/CONTRAST: CPT

## 2023-06-25 PROCEDURE — 250N000009 HC RX 250: Performed by: EMERGENCY MEDICINE

## 2023-06-25 PROCEDURE — 84484 ASSAY OF TROPONIN QUANT: CPT | Performed by: EMERGENCY MEDICINE

## 2023-06-25 PROCEDURE — 82010 KETONE BODYS QUAN: CPT | Performed by: EMERGENCY MEDICINE

## 2023-06-25 PROCEDURE — 83690 ASSAY OF LIPASE: CPT | Performed by: EMERGENCY MEDICINE

## 2023-06-25 PROCEDURE — 82803 BLOOD GASES ANY COMBINATION: CPT | Performed by: EMERGENCY MEDICINE

## 2023-06-25 PROCEDURE — 80053 COMPREHEN METABOLIC PANEL: CPT | Performed by: EMERGENCY MEDICINE

## 2023-06-25 PROCEDURE — 36415 COLL VENOUS BLD VENIPUNCTURE: CPT | Performed by: EMERGENCY MEDICINE

## 2023-06-25 PROCEDURE — 85025 COMPLETE CBC W/AUTO DIFF WBC: CPT | Performed by: EMERGENCY MEDICINE

## 2023-06-25 PROCEDURE — 250N000011 HC RX IP 250 OP 636: Performed by: EMERGENCY MEDICINE

## 2023-06-25 PROCEDURE — 99285 EMERGENCY DEPT VISIT HI MDM: CPT | Mod: 25

## 2023-06-25 RX ORDER — ONDANSETRON 2 MG/ML
4 INJECTION INTRAMUSCULAR; INTRAVENOUS EVERY 30 MIN PRN
Status: DISCONTINUED | OUTPATIENT
Start: 2023-06-25 | End: 2023-06-26 | Stop reason: HOSPADM

## 2023-06-25 RX ORDER — IOPAMIDOL 755 MG/ML
116 INJECTION, SOLUTION INTRAVASCULAR ONCE
Status: COMPLETED | OUTPATIENT
Start: 2023-06-25 | End: 2023-06-25

## 2023-06-25 RX ADMIN — IOPAMIDOL 116 ML: 755 INJECTION, SOLUTION INTRAVENOUS at 23:39

## 2023-06-25 RX ADMIN — SODIUM CHLORIDE 100 ML: 9 INJECTION, SOLUTION INTRAVENOUS at 23:38

## 2023-06-25 ASSESSMENT — ACTIVITIES OF DAILY LIVING (ADL): ADLS_ACUITY_SCORE: 37

## 2023-06-26 VITALS
BODY MASS INDEX: 45.55 KG/M2 | HEIGHT: 60 IN | SYSTOLIC BLOOD PRESSURE: 189 MMHG | WEIGHT: 232 LBS | DIASTOLIC BLOOD PRESSURE: 90 MMHG | HEART RATE: 85 BPM | RESPIRATION RATE: 16 BRPM | OXYGEN SATURATION: 96 % | TEMPERATURE: 98 F

## 2023-06-26 LAB
ALBUMIN SERPL BCG-MCNC: 4.3 G/DL (ref 3.5–5.2)
ALBUMIN UR-MCNC: NEGATIVE MG/DL
ALP SERPL-CCNC: 69 U/L (ref 35–104)
ALT SERPL W P-5'-P-CCNC: 37 U/L (ref 0–50)
ANION GAP SERPL CALCULATED.3IONS-SCNC: 13 MMOL/L (ref 7–15)
APPEARANCE UR: CLEAR
AST SERPL W P-5'-P-CCNC: 16 U/L (ref 0–45)
ATRIAL RATE - MUSE: 81 BPM
B-OH-BUTYR SERPL-SCNC: <0.18 MMOL/L
BILIRUB SERPL-MCNC: 0.3 MG/DL
BILIRUB UR QL STRIP: NEGATIVE
BUN SERPL-MCNC: 24.4 MG/DL (ref 8–23)
CALCIUM SERPL-MCNC: 9.3 MG/DL (ref 8.8–10.2)
CHLORIDE SERPL-SCNC: 100 MMOL/L (ref 98–107)
COLOR UR AUTO: ABNORMAL
CREAT SERPL-MCNC: 1.21 MG/DL (ref 0.51–0.95)
DEPRECATED HCO3 PLAS-SCNC: 22 MMOL/L (ref 22–29)
DIASTOLIC BLOOD PRESSURE - MUSE: NORMAL MMHG
GFR SERPL CREATININE-BSD FRML MDRD: 50 ML/MIN/1.73M2
GLUCOSE SERPL-MCNC: 220 MG/DL (ref 70–99)
GLUCOSE UR STRIP-MCNC: 70 MG/DL
HGB UR QL STRIP: NEGATIVE
INTERPRETATION ECG - MUSE: NORMAL
KETONES UR STRIP-MCNC: NEGATIVE MG/DL
LEUKOCYTE ESTERASE UR QL STRIP: ABNORMAL
LIPASE SERPL-CCNC: 82 U/L (ref 13–60)
MUCOUS THREADS #/AREA URNS LPF: PRESENT /LPF
NITRATE UR QL: NEGATIVE
P AXIS - MUSE: 56 DEGREES
PH UR STRIP: 6 [PH] (ref 5–7)
POTASSIUM SERPL-SCNC: 4.3 MMOL/L (ref 3.4–5.3)
PR INTERVAL - MUSE: 156 MS
PROT SERPL-MCNC: 7.4 G/DL (ref 6.4–8.3)
QRS DURATION - MUSE: 92 MS
QT - MUSE: 386 MS
QTC - MUSE: 448 MS
R AXIS - MUSE: -59 DEGREES
RBC URINE: 1 /HPF
SODIUM SERPL-SCNC: 135 MMOL/L (ref 136–145)
SP GR UR STRIP: 1.01 (ref 1–1.03)
SQUAMOUS EPITHELIAL: 5 /HPF
SYSTOLIC BLOOD PRESSURE - MUSE: NORMAL MMHG
T AXIS - MUSE: 39 DEGREES
TROPONIN T SERPL HS-MCNC: 12 NG/L
UROBILINOGEN UR STRIP-MCNC: NORMAL MG/DL
VENTRICULAR RATE- MUSE: 81 BPM
WBC URINE: 20 /HPF

## 2023-06-26 PROCEDURE — 250N000011 HC RX IP 250 OP 636: Mod: JZ | Performed by: EMERGENCY MEDICINE

## 2023-06-26 PROCEDURE — 96374 THER/PROPH/DIAG INJ IV PUSH: CPT | Mod: 59

## 2023-06-26 PROCEDURE — 81001 URINALYSIS AUTO W/SCOPE: CPT | Performed by: EMERGENCY MEDICINE

## 2023-06-26 PROCEDURE — 96375 TX/PRO/DX INJ NEW DRUG ADDON: CPT

## 2023-06-26 PROCEDURE — 258N000003 HC RX IP 258 OP 636: Performed by: EMERGENCY MEDICINE

## 2023-06-26 PROCEDURE — 96361 HYDRATE IV INFUSION ADD-ON: CPT

## 2023-06-26 PROCEDURE — 87086 URINE CULTURE/COLONY COUNT: CPT | Performed by: EMERGENCY MEDICINE

## 2023-06-26 PROCEDURE — 96376 TX/PRO/DX INJ SAME DRUG ADON: CPT

## 2023-06-26 RX ORDER — DIPHENHYDRAMINE HYDROCHLORIDE 50 MG/ML
12.5 INJECTION INTRAMUSCULAR; INTRAVENOUS ONCE
Status: COMPLETED | OUTPATIENT
Start: 2023-06-26 | End: 2023-06-26

## 2023-06-26 RX ORDER — MORPHINE SULFATE 4 MG/ML
4 INJECTION, SOLUTION INTRAMUSCULAR; INTRAVENOUS ONCE
Status: COMPLETED | OUTPATIENT
Start: 2023-06-26 | End: 2023-06-26

## 2023-06-26 RX ORDER — ONDANSETRON 4 MG/1
4 TABLET, ORALLY DISINTEGRATING ORAL EVERY 4 HOURS PRN
Qty: 10 TABLET | Refills: 0 | Status: SHIPPED | OUTPATIENT
Start: 2023-06-26 | End: 2023-06-29

## 2023-06-26 RX ADMIN — ONDANSETRON 4 MG: 2 INJECTION INTRAMUSCULAR; INTRAVENOUS at 00:43

## 2023-06-26 RX ADMIN — ONDANSETRON 4 MG: 2 INJECTION INTRAMUSCULAR; INTRAVENOUS at 03:05

## 2023-06-26 RX ADMIN — SODIUM CHLORIDE 1000 ML: 9 INJECTION, SOLUTION INTRAVENOUS at 00:03

## 2023-06-26 RX ADMIN — PROCHLORPERAZINE EDISYLATE 10 MG: 5 INJECTION INTRAMUSCULAR; INTRAVENOUS at 03:42

## 2023-06-26 RX ADMIN — DIPHENHYDRAMINE HYDROCHLORIDE 12.5 MG: 50 INJECTION, SOLUTION INTRAMUSCULAR; INTRAVENOUS at 03:41

## 2023-06-26 RX ADMIN — MORPHINE SULFATE 4 MG: 4 INJECTION, SOLUTION INTRAMUSCULAR; INTRAVENOUS at 01:14

## 2023-06-26 ASSESSMENT — ACTIVITIES OF DAILY LIVING (ADL)
ADLS_ACUITY_SCORE: 37

## 2023-06-26 NOTE — ED PROVIDER NOTES
Patient was signed out to me pending urine analysis.    Plan for discharge home after this is resulted.    Urine analysis mildly abnormal though has squamous cells and suspect contamination.  No symptoms of a urinary tract infection.  Given this will culture the urine but not starting antibiotics.      Right before patient was going to be discharged, she started dry heaving.  Patient was given antiemetics and felt much better.  She tolerated oral intake after this.  She feels improved and feels that she can go back to her group home.     Bertha Grove MD  06/26/23 0157       Bertha Grove MD  06/26/23 045

## 2023-06-26 NOTE — ED TRIAGE NOTES
[facility]  ED Arrival Note      Means of Arrival: EMS  Comes from: Group home    Story:Pt brought in by EMS from [facility] abdominal pain for nausea for the last few hours and diarrhea for couple of days.          EMS/PD Interventions:   EMS Medications: Zofran ODT     Meets Stroke Criteria? No  Meets Trauma Criteria? No  Shock Index: N/A      Directed to: Main ED  Belongings: Remain with patient

## 2023-06-26 NOTE — ED PROVIDER NOTES
History     Chief Complaint:  Nausea and Diarrhea       The history is provided by the patient.      Nena Tang is a 63 year old female with history of obesity, hypertension, hyperlipidemia, irritable bowel syndrome and type 2 diabetes who presents with nausea and vomiting that started today and diarrhea for the last couple of days with associated abdominal pain and headache. She states there is no blood in her vomit. She has been having an increase in urinary output. She also has feet swelling. She has had an endoscopy . Denies fever. She has never had this type of episodes happen to her before. No new medication. She is on Prednisone for leg weakness.     Independent Historian:   None - Patient Only    Review of External Notes:   None      Medications:    ondansetron (ZOFRAN ODT) 4 MG ODT tab  acetaminophen (TYLENOL) 500 MG tablet  Alcohol Swabs (EASY TOUCH ALCOHOL PREP MEDIUM) 70 % PADS  alendronate (FOSAMAX) 70 MG tablet  amantadine (SYMMETREL) 100 MG capsule  aspirin (ASPIRIN LOW DOSE) 81 MG EC tablet  atorvastatin (LIPITOR) 80 MG tablet  blood glucose (NO BRAND SPECIFIED) test strip  calcium carbonate (OS-ALLEN) 1500 (600 Ca) MG tablet  clonazePAM (KLONOPIN) 0.5 MG tablet  Continuous Blood Gluc Sensor (DEXCOM G6 SENSOR) MISC  Continuous Blood Gluc Transmit (DEXCOM G6 TRANSMITTER) MISC  diclofenac (VOLTAREN) 1 % topical gel  docusate sodium (COLACE) 100 MG capsule  escitalopram (LEXAPRO) 20 MG tablet  gabapentin (NEURONTIN) 300 MG capsule  hydrOXYzine (VISTARIL) 25 MG capsule  insulin glargine (LANTUS PEN) 100 UNIT/ML pen  insulin lispro (HUMALOG KWIKPEN) 100 UNIT/ML (1 unit dial) KWIKPEN  insulin pen needle (NOVOFINE 30) 30G X 8 MM miscellaneous  losartan (COZAAR) 100 MG tablet  magnesium 250 MG tablet  metoprolol succinate ER (TOPROL XL) 50 MG 24 hr tablet  MYRBETRIQ 25 MG 24 hr tablet  nystatin (MYCOSTATIN) 035330 UNIT/GM external powder  ondansetron (ZOFRAN) 4 MG tablet  OneTouch Delica Lancets 33G  MISC  order for DME  order for DME  paliperidone ER (INVEGA) 9 MG 24 hr tablet  pantoprazole (PROTONIX) 40 MG EC tablet  prazosin (MINIPRESS) 1 MG capsule  predniSONE (DELTASONE) 20 MG tablet  QUEtiapine (SEROQUEL) 100 MG tablet  senna-docusate (SENOKOT-S/PERICOLACE) 8.6-50 MG tablet  thin (NO BRAND SPECIFIED) lancets  traZODone (DESYREL) 100 MG tablet  TRULICITY 3 MG/0.5ML SOPN  vitamin C (ASCORBIC ACID) 500 MG tablet  zinc oxide (DESITIN) 20 % external ointment        Past Medical History:    Past Medical History:   Diagnosis Date     Acute respiratory failure with hypoxia (H) 9/4/2017     CAD (coronary artery disease)      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/2011     Noncompliance with medication regimen 6/8/2011     Obesity      CINDY (obstructive sleep apnea) 3/8/2012     CINDY (obstructive sleep apnea)- mild AHI 10.3      Osteopenia 10/7/2009     Pneumonia of right lower lobe due to infectious organism 9/4/2017     Schizoaffective disorder, depressive type (H) 2/25/2013     Sepsis (H) 8/29/2017     Suicidal intent 10/2/2013     Takotsubo cardiomyopathy      Type 2 diabetes mellitus (H) 8/30/2011     Uterine cancer (H) 1983     Verbal auditory hallucination 10/4/2012       Past Surgical History:    Past Surgical History:   Procedure Laterality Date     CATARACT IOL, RT/LT Bilateral 2017     CHOLECYSTECTOMY       COLONOSCOPY N/A 3/16/2017    Procedure: COLONOSCOPY;  Surgeon: Traci Gonzalez MD;  Location: [facility]     Coronary CTA  5/21/2014     HYSTERECTOMY  1983    uterine cancer yearly pap's per provider.     HYSTERECTOMY       LAPAROSCOPIC CHOLECYSTECTOMY  2008      PHACOEMULSIFICATION CLEAR CORNEA WITH STANDARD INTRAOCULAR LENS IMPLANT Left 5/5/2017    Procedure: PHACOEMULSIFICATION CLEAR CORNEA WITH STANDARD INTRAOCULAR LENS IMPLANT;  LEFT EYE PHACOEMULSIFICATION CLEAR CORNEA WITH STANDARD INTRAOCULAR LENS IMPLANT ;  Surgeon: Tyra Diaz MD;  Location:  [facility]     PHACOEMULSIFICATION CLEAR CORNEA WITH STANDARD INTRAOCULAR LENS IMPLANT Right 6/30/2017    Procedure: PHACOEMULSIFICATION CLEAR CORNEA WITH STANDARD INTRAOCULAR LENS IMPLANT;  RIGHT EYE PHACOEMULSIFICATION CLEAR CORNEA WITH STANDARD INTRAOCULAR LENS IMPLANT;  Surgeon: Tyra Diaz MD;  Location:  [facility]     RELEASE TRIGGER FINGER  10/11/2012    Left thumb. Procedure: RELEASE TRIGGER FINGER;  LEFT THUMB TRIGGER RELEASE;  Surgeon: Tay Langley MD;  Location:  [facility]     RELEASE TRIGGER FINGER Right 12/26/2016    Procedure: RELEASE TRIGGER FINGER;  Surgeon: Albino Castañeda MD;  Location:  OR     [facility] OOPHORECTORMY FOR RAHEL, W/BX  1983    UTERINE        Physical Exam     Patient Vitals for the past 24 hrs:   BP Temp Temp src Pulse Resp SpO2 Height Weight   06/26/23 0045 -- -- -- -- -- 96 % -- --   06/26/23 0044 (!) 189/90 -- -- 85 -- 95 % -- --   06/25/23 2306 -- -- -- -- -- 98 % -- --   06/25/23 2251 (!) 171/86 -- -- -- -- 92 % -- --   06/25/23 2247 (!) 171/86 98  F (36.7  C) Oral 81 16 94 % 1.524 m (5') 105.2 kg (232 lb)        Physical Exam  Constitutional: Patient is a 63 year old female who is obese. No respiratory distress   HENT: No signs of trauma.   Eyes: EOM are normal. Pupils are equal, round, and reactive to light.   Neck: Normal range of motion. No JVD present. No cervical adenopathy.  Cardiovascular: Regular rhythm.  Exam reveals no gallop and no friction rub.    No murmur heard.  Pulmonary/Chest: Bilateral breath sounds normal. No wheezes, rhonchi or rales.  Abdominal: Soft. No tenderness. No rebound or guarding. Abdominal tenderness in epigastric region Midline suprapubic incision.    Musculoskeletal: No edema. No tenderness. Pitting edema in feet.   Lymphadenopathy: No lymphadenopathy.   Neurological: Alert and oriented to person, place, and time. Normal strength. Coordination normal.   Skin: Skin is warm and dry. No rash noted. No erythema.       Emergency Department Course   ECG  ECG taken at 2320, ECG read at 2323  Normal sinus rhythm. LAHB   Rate 81 bpm. ID interval 156 ms. QRS duration 92 ms. QT/QTc 386/448 ms. P-R-T axes 56 -59 39.     Imaging:  CT Abdomen Pelvis w Contrast   Final Result   IMPRESSION:    1.  No acute abnormality in the abdomen or pelvis.         Report per radiology    Laboratory:  Labs Ordered and Resulted from Time of ED Arrival to Time of ED Departure   COMPREHENSIVE METABOLIC PANEL - Abnormal       Result Value    Sodium 135 (*)     Potassium 4.3      Chloride 100      Carbon Dioxide (CO2) 22      Anion Gap 13      Urea Nitrogen 24.4 (*)     Creatinine 1.21 (*)     Calcium 9.3      Glucose 220 (*)     Alkaline Phosphatase 69      AST 16      ALT 37      Protein Total 7.4      Albumin 4.3      Bilirubin Total 0.3      GFR Estimate 50 (*)    LIPASE - Abnormal    Lipase 82 (*)    BLOOD GAS VENOUS - Abnormal    pH Venous 7.29 (*)     pCO2 Venous 52 (*)     pO2 Venous 23 (*)     Bicarbonate Venous 25      Base Excess/Deficit (+/-) -2.3      FIO2 95     CBC WITH PLATELETS AND DIFFERENTIAL - Abnormal    WBC Count 10.3      RBC Count 3.45 (*)     Hemoglobin 11.4 (*)     Hematocrit 35.3       (*)     MCH 33.0      MCHC 32.3      RDW 12.8      Platelet Count 160      % Neutrophils 65      % Lymphocytes 24      % Monocytes 8      % Eosinophils 0      % Basophils 1      % Immature Granulocytes 2      NRBCs per 100 WBC 0      Absolute Neutrophils 6.8      Absolute Lymphocytes 2.5      Absolute Monocytes 0.8      Absolute Eosinophils 0.0      Absolute Basophils 0.1      Absolute Immature Granulocytes 0.2      Absolute NRBCs 0.0     TROPONIN T, HIGH SENSITIVITY - Normal     Troponin T, High Sensitivity 12     KETONE BETA-HYDROXYBUTYRATE QUANTITATIVE, RAPID - Normal    Ketone (Beta-Hydroxybutyrate) Quantitative <0.18     ROUTINE UA WITH MICROSCOPIC REFLEX TO CULTURE        Emergency Department Course & Assessments:       Interventions:  Medications   ondansetron (ZOFRAN) injection 4 mg (4 mg Intravenous $Given 6/26/23 0043)   morphine (PF) injection 4 mg (has no administration in time range)   0.9% sodium chloride BOLUS (1,000 mLs Intravenous $New Bag 6/26/23 0003)   iopamidol (ISOVUE-370) solution 116 mL (116 mLs Intravenous $Given 6/25/23 2339)   sodium chloride 0.9 % CT scan flush use (100 mLs Intravenous $Given 6/25/23 2338)        Assessments:  2255 I examined the patient and obtained the history above    Independent Interpretation (X-rays, CTs, rhythm strip):  None    Consultations/Discussion of Management or Tests:  ED Course as of 06/26/23 0115   Mon Jun 26, 2023   0110 Bicarbonate Venous: 25       Social Determinants of Health affecting care:   None    Disposition:      Impression & Plan      Medical Decision Making:  Jerrica Tang is a 63 year old female who is coming from a group home who complains of loose stool, diarrhea and an episode of vomiting. She tells me that the real reason why she is here is for the vomiting. She has diabetes and underlying schizoaffective disorder. On exam, she is awake and alert. There is no obvious psychosis. Her vital signs show a slight elevation of blood pressure but otherwise is good. Her abdomen reveal minimal tenderness in the upper abdomen. There is no guarding or rebound. CT of the abdomen was obtained which shows no acute findings. Her labs reveal some mild renal insufficiency. White count was normal. Her Ph venous was 7.29 however her bicarbonate was normal. Her anion gap was normal. Ketones were negative so I do not think this represents DKA. Her troponin and ECG are good. Patient has received some IV fluids and some pain medication  and nausea medication which is helping. Urine has just been obtained and is pending. If urine appears to show infection than she will need some antibiotics. If not she will be discharged with symptomatic treatment, Zofran. Patient signed out to night ED doc      Diagnosis:    ICD-10-CM    1. Pain of upper abdomen  R10.10       2. Nausea and vomiting, unspecified vomiting type  R11.2       3. Diarrhea, unspecified type  R19.7            Discharge Medications:  New Prescriptions    ONDANSETRON (ZOFRAN ODT) 4 MG ODT TAB    Take 1 tablet (4 mg) by mouth every 4 hours as needed for nausea        Scribe Disclosure:  STRAR HUSSEIN, am serving as a scribe at 10:53 PM on 6/25/2023 to document services personally performed by Jose Alba MD based on my observations and the provider's statements to me.   6/25/2023   Jose Alba MD Steinman, Randall Ira, MD  06/26/23 0125

## 2023-06-26 NOTE — ED NOTES
Group home called and spoke with care giver dustin, notified him that pt will be coming back via WC. Transport called and arrange to  the patient from ED back to facility

## 2023-06-27 ENCOUNTER — ANCILLARY PROCEDURE (OUTPATIENT)
Dept: CT IMAGING | Facility: CLINIC | Age: 63
End: 2023-06-27
Attending: FAMILY MEDICINE
Payer: COMMERCIAL

## 2023-06-27 ENCOUNTER — PATIENT OUTREACH (OUTPATIENT)
Dept: CARE COORDINATION | Facility: CLINIC | Age: 63
End: 2023-06-27
Payer: COMMERCIAL

## 2023-06-27 DIAGNOSIS — R07.81 RIB PAIN ON RIGHT SIDE: ICD-10-CM

## 2023-06-27 LAB — BACTERIA UR CULT: NORMAL

## 2023-06-27 PROCEDURE — 71250 CT THORAX DX C-: CPT

## 2023-06-27 NOTE — PROGRESS NOTES
[facility]    Background: Transitional Care Management program identified per system criteria and reviewed by [facility] team for possible outreach.    Assessment: Upon chart review, [facility] Team member will not proceed with patient outreach related to this episode of Transitional Care Management program due to reason below:    Patient has discharged to a Group Home where patient is receiving on-site support with their daily cares, including support with hospital follow up plan.    Plan: Transitional Care Management episode addressed appropriately per reason noted above.      DINORA Arroyo  [facility]    *Connected Care Resource Team does NOT follow patient ongoing. Referrals are identified based on internal discharge reports and the outreach is to ensure patient has an understanding of their discharge instructions.

## 2023-06-27 NOTE — RESULT ENCOUNTER NOTE
Final urine culture report is negative.  Adult Negative Urine culture parameters per protocol: Any # Urogenital single or mixed organism, <10,000 col/ml single organism (cath/midstream), and > 3 organisms (No susceptibilities performed).  [facility] discharge antibiotic prescribed (If applicable): None  Treatment recommendations per [facility] ED Lab Result Urine Culture protocol.

## 2023-06-28 NOTE — PROGRESS NOTES
Large Joint Injection/Arthocentesis: L knee joint    Date/Time: 6/22/2023 2:00 PM    Performed by: Luiz Hernandez DO  Authorized by: Luiz Hernandez DO    Indications:  Pain  Needle Size:  22 G  Guidance: landmark guided    Approach:  Anterolateral  Location:  Knee      Medications:  40 mg triamcinolone 40 MG/ML; 4 mL lidocaine 1 %  Outcome:  Tolerated well, no immediate complications  Procedure discussed: discussed risks, benefits, and alternatives    Consent Given by:  Patient  Timeout: timeout called immediately prior to procedure    Prep: patient was prepped and draped in usual sterile fashion

## 2023-06-30 NOTE — TELEPHONE ENCOUNTER
"Requested Prescriptions   Pending Prescriptions Disp Refills     calcium carbonate (OS-ALLEN) 1500 (600 Ca) MG tablet 180 tablet 3     Sig: Take 3 tablets (1,800 mg) by mouth daily   Last Written Prescription Date:  04/11/2019  Last Fill Quantity: 180,  # refills: 3  Last office visit: 10/11/2019 with prescribing provider:    Future Office Visit:   Next 5 appointments (look out 90 days)    Oct 25, 2019  3:30 PM CDT  [place], [facility] [address] [place] [address]  [address] [place] [id number]  [phone]   Nov 05, 2019  1:30 PM CST  Return Visit with ART Arias [facility] ([facility]) [address] [place] [address]  [address] [place] [id number]  [phone]   Nov 05, 2019  1:30 PM CST  [place] Lopez, [facility] [place] [address] [place] [address]  [address] [place] [phone]  [phone]   Nov 05, 2019  1:30 PM CST  Office Visit with Eugenia De Jesus [facility] ([facility]) [address] [place] [address]  [address] [place] [phone]  [phone]             Vitamin Supplements (Adult) Protocol Passed - 10/24/2019  2:18 PM        Passed - High dose Vitamin D not ordered        Passed - Recent (12 mo) or future (30 days) visit within the authorizing provider's specialty     Patient has had an office visit with the authorizing provider or a provider within the authorizing providers department within the previous 12 mos or has a future within next 30 days. See \"Patient Info\" tab in inbasket, or \"Choose Columns\" in Meds & Orders section of the refill encounter.              Passed - Medication is active on med list          " [employer] Pregnancy And Lactation Text: This medication is Pregnancy Category B and is considered safe. There is some data to suggest avoiding during the third trimester. It is unknown if this medication is excreted in breast milk.

## 2023-07-03 DIAGNOSIS — F25.0 SCHIZOAFFECTIVE DISORDER, BIPOLAR TYPE (H): ICD-10-CM

## 2023-07-03 RX ORDER — CLONAZEPAM 0.5 MG/1
TABLET ORAL
Qty: 28 TABLET | Refills: 5 | Status: SHIPPED | OUTPATIENT
Start: 2023-07-03 | End: 2023-09-27

## 2023-07-05 RX ORDER — TRIAMCINOLONE ACETONIDE 40 MG/ML
40 INJECTION, SUSPENSION INTRA-ARTICULAR; INTRAMUSCULAR
Status: SHIPPED | OUTPATIENT
Start: 2023-06-22

## 2023-07-05 RX ORDER — LIDOCAINE HYDROCHLORIDE 10 MG/ML
4 INJECTION, SOLUTION INFILTRATION; PERINEURAL
Status: SHIPPED | OUTPATIENT
Start: 2023-06-22

## 2023-07-11 ENCOUNTER — OFFICE VISIT (OUTPATIENT)
Dept: FAMILY MEDICINE | Facility: CLINIC | Age: 63
End: 2023-07-11
Attending: FAMILY MEDICINE
Payer: COMMERCIAL

## 2023-07-11 VITALS
OXYGEN SATURATION: 96 % | WEIGHT: 240 LBS | HEART RATE: 75 BPM | HEIGHT: 60 IN | SYSTOLIC BLOOD PRESSURE: 125 MMHG | BODY MASS INDEX: 47.12 KG/M2 | TEMPERATURE: 97 F | DIASTOLIC BLOOD PRESSURE: 58 MMHG | RESPIRATION RATE: 19 BRPM

## 2023-07-11 DIAGNOSIS — R76.8 POSITIVE ANA (ANTINUCLEAR ANTIBODY): Primary | ICD-10-CM

## 2023-07-11 DIAGNOSIS — M79.10 MYALGIA: ICD-10-CM

## 2023-07-11 DIAGNOSIS — R35.0 URINARY FREQUENCY: ICD-10-CM

## 2023-07-11 LAB
ANION GAP SERPL CALCULATED.3IONS-SCNC: 13 MMOL/L (ref 7–15)
BASOPHILS # BLD AUTO: 0 10E3/UL (ref 0–0.2)
BASOPHILS NFR BLD AUTO: 0 %
BUN SERPL-MCNC: 24 MG/DL (ref 8–23)
CALCIUM SERPL-MCNC: 9.6 MG/DL (ref 8.8–10.2)
CHLORIDE SERPL-SCNC: 105 MMOL/L (ref 98–107)
CK SERPL-CCNC: 60 U/L (ref 26–192)
CREAT SERPL-MCNC: 1.43 MG/DL (ref 0.51–0.95)
DEPRECATED HCO3 PLAS-SCNC: 23 MMOL/L (ref 22–29)
EOSINOPHIL # BLD AUTO: 0.1 10E3/UL (ref 0–0.7)
EOSINOPHIL NFR BLD AUTO: 1 %
ERYTHROCYTE [DISTWIDTH] IN BLOOD BY AUTOMATED COUNT: 13.2 % (ref 10–15)
GFR SERPL CREATININE-BSD FRML MDRD: 41 ML/MIN/1.73M2
GLUCOSE SERPL-MCNC: 132 MG/DL (ref 70–99)
HCT VFR BLD AUTO: 32.9 % (ref 35–47)
HGB BLD-MCNC: 10.3 G/DL (ref 11.7–15.7)
IMM GRANULOCYTES # BLD: 0.1 10E3/UL
IMM GRANULOCYTES NFR BLD: 2 %
LYMPHOCYTES # BLD AUTO: 1.7 10E3/UL (ref 0.8–5.3)
LYMPHOCYTES NFR BLD AUTO: 28 %
MCH RBC QN AUTO: 32.8 PG (ref 26.5–33)
MCHC RBC AUTO-ENTMCNC: 31.3 G/DL (ref 31.5–36.5)
MCV RBC AUTO: 105 FL (ref 78–100)
MONOCYTES # BLD AUTO: 0.4 10E3/UL (ref 0–1.3)
MONOCYTES NFR BLD AUTO: 6 %
NEUTROPHILS # BLD AUTO: 3.8 10E3/UL (ref 1.6–8.3)
NEUTROPHILS NFR BLD AUTO: 62 %
PLATELET # BLD AUTO: 174 10E3/UL (ref 150–450)
POTASSIUM SERPL-SCNC: 4.3 MMOL/L (ref 3.4–5.3)
RBC # BLD AUTO: 3.14 10E6/UL (ref 3.8–5.2)
SODIUM SERPL-SCNC: 141 MMOL/L (ref 136–145)
WBC # BLD AUTO: 6.1 10E3/UL (ref 4–11)

## 2023-07-11 PROCEDURE — 86235 NUCLEAR ANTIGEN ANTIBODY: CPT | Mod: 59 | Performed by: FAMILY MEDICINE

## 2023-07-11 PROCEDURE — 82550 ASSAY OF CK (CPK): CPT | Performed by: FAMILY MEDICINE

## 2023-07-11 PROCEDURE — 86160 COMPLEMENT ANTIGEN: CPT | Performed by: FAMILY MEDICINE

## 2023-07-11 PROCEDURE — 86235 NUCLEAR ANTIGEN ANTIBODY: CPT | Performed by: FAMILY MEDICINE

## 2023-07-11 PROCEDURE — 99214 OFFICE O/P EST MOD 30 MIN: CPT | Performed by: FAMILY MEDICINE

## 2023-07-11 PROCEDURE — 86225 DNA ANTIBODY NATIVE: CPT | Performed by: FAMILY MEDICINE

## 2023-07-11 PROCEDURE — 80048 BASIC METABOLIC PNL TOTAL CA: CPT | Performed by: FAMILY MEDICINE

## 2023-07-11 PROCEDURE — 86381 MITOCHONDRIAL ANTIBODY EACH: CPT | Performed by: FAMILY MEDICINE

## 2023-07-11 PROCEDURE — 85025 COMPLETE CBC W/AUTO DIFF WBC: CPT | Performed by: FAMILY MEDICINE

## 2023-07-11 PROCEDURE — 36415 COLL VENOUS BLD VENIPUNCTURE: CPT | Performed by: FAMILY MEDICINE

## 2023-07-11 PROCEDURE — 86160 COMPLEMENT ANTIGEN: CPT | Mod: 59 | Performed by: FAMILY MEDICINE

## 2023-07-11 RX ORDER — MIRABEGRON 25 MG/1
TABLET, FILM COATED, EXTENDED RELEASE ORAL
Qty: 28 TABLET | Refills: 1 | Status: ON HOLD | OUTPATIENT
Start: 2023-07-11 | End: 2023-09-20

## 2023-07-11 RX ORDER — PREDNISONE 5 MG/1
15 TABLET ORAL DAILY
Qty: 90 TABLET | Refills: 1 | Status: SHIPPED | OUTPATIENT
Start: 2023-07-11 | End: 2023-09-12

## 2023-07-11 ASSESSMENT — PAIN SCALES - GENERAL: PAINLEVEL: SEVERE PAIN (6)

## 2023-07-11 NOTE — PROGRESS NOTES
Assessment & Plan     Positive AIDA (antinuclear antibody)  Reviewed recent rheumatology testing.  Rheumatoid factor was slightly elevated but anti-CCP antibody was negative so I suspect rheumatoid arthritis is less likely and she does not really have any joint swelling in a pattern to suggest that.  She has had an elevated AIDA in the past, consistently with a centromere pattern.  We will do some follow-up laboratory testing as noted below.  I did have a chance to review her chart after completion of the visit and she had seen rheumatology as far back as 2018 for this issue.  [place] rheumatology at that time felt that she might have an underlying inflammatory disorder and she was started on leflunomide for steroids sparing reasons and felt that other anti-inflammatory agents were contraindicated like Plaquenil due to her underlying diabetic retinopathy.  She then transitioned over to rheumatology at [facility] because the [place] rheumatologist was leaving the organization.  That evaluation need more towards no underlying autoimmune/inflammatory disorder given that her CRP was normal and her sed rate was only slightly elevated may be consistent with age.  Other tests as we are doing and follow-up today were largely unremarkable and she did not have symptoms consistent with CREST or scleroderma.  She was transitioned off the leflunomide and given bilateral shoulder injections for rotator cuff issues/subacromial bursitis, which led to significant improvement of her shoulder symptoms.  Short-term follow-up has been scheduled to review the test results and discuss ongoing plans.  - DNA double stranded antibodies; Future  - CBC with platelets and differential; Future  - Basic metabolic panel  (Ca, Cl, CO2, Creat, Gluc, K, Na, BUN); Future  - CK total; Future  - Scleroderma Antibody Scl70 VIJAY IgG; Future  - Centromere Antibody IgG; Future  - RNP Antibody IgG; Future  - Mitochondrial M2 Antibody IgG; Future  - DNA  double stranded antibodies  - CBC with platelets and differential  - Basic metabolic panel  (Ca, Cl, CO2, Creat, Gluc, K, Na, BUN)  - CK total  - Scleroderma Antibody Scl70 VIJAY IgG  - Centromere Antibody IgG  - RNP Antibody IgG  - Mitochondrial M2 Antibody IgG  - Vogt VIJAY Antibody IgG; Future  - Complement C3; Future  - Complement C4; Future    Myalgia  She actually reports that many of her aches and pains have significantly improved at this time.  She did not experience a rapid turnaround that I might suspect will be present if she had PMR.  She has started to develop some hyperglycemia related to the prednisone so the dose will be decreased to 15 mg at this time.  Overall, she does look improved and if all of the testing above is negative then PMR may be a reasonable diagnosis to consider at this time.  I scheduled short-term follow-up in 2 weeks and asked that [facility] pharmacy be available to address the expected hyperglycemia.  Inflammatory markers which have been markedly elevated at her first visit including sed rate, and CRP, had normalized completely after putting her on 20 mg of prednisone.  Possibility of some other underlying autoimmune diseases being considered with testing as noted above.  It is difficult to get a good history from her at times.  Staff reports that she is doing better at this time and complaining of less pain as well as being more independent and needing less assist with activities than she was a month ago.  - predniSONE (DELTASONE) 5 MG tablet; Take 3 tablets (15 mg) by mouth daily  - Vogt VIJAY Antibody IgG; Future  - Complement C3; Future  - Complement C4; Future    Urinary frequency        Review of prior external note(s) from - [facility]where information from [facility]  reviewed             Albino Rainey MD  [facility]    Surjit Barrett is a 63 year old, presenting for the following health issues:  Follow Up        7/11/2023     9:57 AM  "  Additional Questions   Roomed by heath   Accompanied by octavia barnett     History of Present Illness       Reason for visit:  Follow up lab result    She eats 2-3 servings of fruits and vegetables daily.She consumes 1 sweetened beverage(s) daily.She exercises with enough effort to increase her heart rate 10 to 19 minutes per day.  She exercises with enough effort to increase her heart rate 3 or less days per week.   She is taking medications regularly.         Patient is here for follow-up today.  Today she tells me that she is actually doing a lot better and her pain has improved significantly.  She had 1 emergency room visit in the interim which sounds like it was more for an acute gastroenteritis and those symptoms have resolved.  She had a chest CT done in the interim which showed some coronary calcification but otherwise unremarkable.  She reports that her pain is quite a bit better at this time.  Staff reports this to be the case as well.  She also is more active at the facility and although she still needs guidance with certain activities it sounds like she is requiring quite a bit less assistance than she was 4 to 6 weeks ago.  Unfortunately, as expected, her sugars have been more erratic.      Review of Systems   Constitutional, HEENT, cardiovascular, pulmonary, gi and gu systems are negative, except as otherwise noted.      Objective    /58   Pulse 75   Temp 97  F (36.1  C) (Temporal)   Resp 19   Ht 1.53 m (5' 0.24\")   Wt 108.9 kg (240 lb)   LMP 01/06/2015 (Exact Date)   SpO2 96%   BMI 46.51 kg/m    Body mass index is 46.51 kg/m .  Physical Exam   GENERAL: healthy, alert and no distress  EYES: Eyes grossly normal to inspection, PERRL and conjunctivae and sclerae normal  MS: no gross musculoskeletal defects noted, no edema  SKIN: no suspicious lesions or rashes  NEURO: Normal strength and tone, mentation intact and speech normal  PSYCH: mentation appears normal, affect " normal/bright    Admission on 06/25/2023, Discharged on 06/26/2023   Component Date Value Ref Range Status     Sodium 06/25/2023 135 (L)  136 - 145 mmol/L Final     Potassium 06/25/2023 4.3  3.4 - 5.3 mmol/L Final     Chloride 06/25/2023 100  98 - 107 mmol/L Final     Carbon Dioxide (CO2) 06/25/2023 22  22 - 29 mmol/L Final     Anion Gap 06/25/2023 13  7 - 15 mmol/L Final     Urea Nitrogen 06/25/2023 24.4 (H)  8.0 - 23.0 mg/dL Final     Creatinine 06/25/2023 1.21 (H)  0.51 - 0.95 mg/dL Final     Calcium 06/25/2023 9.3  8.8 - 10.2 mg/dL Final     Glucose 06/25/2023 220 (H)  70 - 99 mg/dL Final     Alkaline Phosphatase 06/25/2023 69  35 - 104 U/L Final     AST 06/25/2023 16  0 - 45 U/L Final    Reference intervals for this test were updated on 6/12/2023 to more accurately reflect our healthy population. There may be differences in the flagging of prior results with similar values performed with this method. Interpretation of those prior results can be made in the context of the updated reference intervals.     ALT 06/25/2023 37  0 - 50 U/L Final    Reference intervals for this test were updated on 6/12/2023 to more accurately reflect our healthy population. There may be differences in the flagging of prior results with similar values performed with this method. Interpretation of those prior results can be made in the context of the updated reference intervals.       Protein Total 06/25/2023 7.4  6.4 - 8.3 g/dL Final     Albumin 06/25/2023 4.3  3.5 - 5.2 g/dL Final     Bilirubin Total 06/25/2023 0.3  <=1.2 mg/dL Final     GFR Estimate 06/25/2023 50 (L)  >60 mL/min/1.73m2 Final     Lipase 06/25/2023 82 (H)  13 - 60 U/L Final     Troponin T, High Sensitivity 06/25/2023 12  <=14 ng/L Final    Either a High Sensitivity Troponin T baseline (0 hours) value = 100 ng/L, or an increase in High Sensitivity Troponin T = 7 ng/L at 2 hours compared to 0 hours (2-0 hours), suggests myocardial injury, and urgent clinical attention is  required.    If the 2-0 hours increase is <7 ng/L, a High Sensitivity Troponin T result above gender-specific reference ranges warrants further evaluation.   Recommendations for further evaluation include correlation with clinical decision-making tool (e.g., HEART), a 3rd High Sensitivity Troponin T test 2 hours after the 2nd (a 20% change from baseline would represent concern), admission for observation, close PCC/cardiology follow-up, or urgent outpatient provocative testing.     Color Urine 06/26/2023 Straw  Colorless, Straw, Light Yellow, Yellow Final     Appearance Urine 06/26/2023 Clear  Clear Final     Glucose Urine 06/26/2023 70 (A)  Negative mg/dL Final     Bilirubin Urine 06/26/2023 Negative  Negative Final     Ketones Urine 06/26/2023 Negative  Negative mg/dL Final     Specific Gravity Urine 06/26/2023 1.010  1.003 - 1.035 Final     Blood Urine 06/26/2023 Negative  Negative Final     pH Urine 06/26/2023 6.0  5.0 - 7.0 Final     Protein Albumin Urine 06/26/2023 Negative  Negative mg/dL Final     Urobilinogen Urine 06/26/2023 Normal  Normal, 2.0 mg/dL Final     Nitrite Urine 06/26/2023 Negative  Negative Final     Leukocyte Esterase Urine 06/26/2023 Small (A)  Negative Final     Mucus Urine 06/26/2023 Present (A)  None Seen /LPF Final     RBC Urine 06/26/2023 1  <=2 /HPF Final     WBC Urine 06/26/2023 20 (H)  <=5 /HPF Final     Squamous Epithelials Urine 06/26/2023 5 (H)  <=1 /HPF Final     pH Venous 06/25/2023 7.29 (L)  7.32 - 7.43 Final     pCO2 Venous 06/25/2023 52 (H)  40 - 50 mm Hg Final     pO2 Venous 06/25/2023 23 (L)  25 - 47 mm Hg Final     Bicarbonate Venous 06/25/2023 25  21 - 28 mmol/L Final     Base Excess/Deficit (+/-) 06/25/2023 -2.3  -7.7 - 1.9 mmol/L Final     FIO2 06/25/2023 95   Final    room air     Ketone (Beta-Hydroxybutyrate) Guille* 06/25/2023 <0.18  <=0.30 mmol/L Final     Ventricular Rate 06/25/2023 81  BPM Final     Atrial Rate 06/25/2023 81  BPM Final     TX Interval 06/25/2023 156   ms Final     QRS Duration 06/25/2023 92  ms Final     QT 06/25/2023 386  ms Final     QTc 06/25/2023 448  ms Final     P Axis 06/25/2023 56  degrees Final     R AXIS 06/25/2023 -59  degrees Final     T Axis 06/25/2023 39  degrees Final     Interpretation ECG 06/25/2023    Final                    Value:Sinus rhythm  Left axis deviation  Abnormal ECG  When compared with ECG of 10-MAY-2023 17:51,  Nonspecific T wave abnormality has replaced inverted T waves in Inferior leads  Nonspecific T wave abnormality, improved in Anterolateral leads  Confirmed by GENERATED REPORT, COMPUTER ([id number]),  Raj Gonsales ([id number]) on 6/26/2023 1:42:00 AM       WBC Count 06/25/2023 10.3  4.0 - 11.0 10e3/uL Final     RBC Count 06/25/2023 3.45 (L)  3.80 - 5.20 10e6/uL Final     Hemoglobin 06/25/2023 11.4 (L)  11.7 - 15.7 g/dL Final     Hematocrit 06/25/2023 35.3  35.0 - 47.0 % Final     MCV 06/25/2023 102 (H)  78 - 100 fL Final     MCH 06/25/2023 33.0  26.5 - 33.0 pg Final     MCHC 06/25/2023 32.3  31.5 - 36.5 g/dL Final     RDW 06/25/2023 12.8  10.0 - 15.0 % Final     Platelet Count 06/25/2023 160  150 - 450 10e3/uL Final     % Neutrophils 06/25/2023 65  % Final     % Lymphocytes 06/25/2023 24  % Final     % Monocytes 06/25/2023 8  % Final     % Eosinophils 06/25/2023 0  % Final     % Basophils 06/25/2023 1  % Final     % Immature Granulocytes 06/25/2023 2  % Final     NRBCs per 100 WBC 06/25/2023 0  <1 /100 Final     Absolute Neutrophils 06/25/2023 6.8  1.6 - 8.3 10e3/uL Final     Absolute Lymphocytes 06/25/2023 2.5  0.8 - 5.3 10e3/uL Final     Absolute Monocytes 06/25/2023 0.8  0.0 - 1.3 10e3/uL Final     Absolute Eosinophils 06/25/2023 0.0  0.0 - 0.7 10e3/uL Final     Absolute Basophils 06/25/2023 0.1  0.0 - 0.2 10e3/uL Final     Absolute Immature Granulocytes 06/25/2023 0.2  <=0.4 10e3/uL Final     Absolute NRBCs 06/25/2023 0.0  10e3/uL Final     Culture 06/26/2023 >100,000 CFU/mL Mixture of urogenital timoteo   Final

## 2023-07-12 DIAGNOSIS — E11.65 TYPE 2 DIABETES MELLITUS WITH HYPERGLYCEMIA, WITH LONG-TERM CURRENT USE OF INSULIN (H): ICD-10-CM

## 2023-07-12 DIAGNOSIS — Z79.4 TYPE 2 DIABETES MELLITUS WITH HYPERGLYCEMIA, WITH LONG-TERM CURRENT USE OF INSULIN (H): ICD-10-CM

## 2023-07-12 RX ORDER — DULAGLUTIDE 3 MG/.5ML
INJECTION, SOLUTION SUBCUTANEOUS
Qty: 2 ML | Refills: 3 | Status: SHIPPED | OUTPATIENT
Start: 2023-07-12 | End: 2023-10-24 | Stop reason: ALTCHOICE

## 2023-07-12 NOTE — TELEPHONE ENCOUNTER
"Requested Prescriptions   Pending Prescriptions Disp Refills     TRULICITY 3 MG/0.5ML SOPN [Pharmacy Med Name: Trulicity 3MG/0.5ML SOPN] 2 mL 3     Sig: INJECT 3MG SUBCUTANEOUSLY EVERY 7 DAYS       GLP-1 Agonists Protocol Failed - 7/12/2023 11:47 AM        Failed - Normal serum creatinine on file in past 12 months     Recent Labs   Lab Test 07/11/23  1057 05/16/19  0658 05/15/19  1834   CR 1.43*   < >  --    CREAT  --   --  0.9    < > = values in this interval not displayed.       Ok to refill medication if creatinine is low          Passed - HgbA1C in past 3 or 6 months     If HgbA1C is 8 or greater, it needs to be on file within the past 3 months.  If less than 8, must be on file within the past 6 months.     Recent Labs   Lab Test 06/20/23  1046 03/24/22  1211 02/25/22  1318   A1C 6.7*   < >  --    69489  --   --  7.7*    < > = values in this interval not displayed.             Passed - Medication is active on med list        Passed - Patient is age 18 or older        Passed - No active pregnancy on record        Passed - No positive pregnancy test in past 12 months        Passed - Recent (6 mo) or future (30 days) visit within the authorizing provider's specialty     Patient had office visit in the last 6 months or has a visit in the next 30 days with authorizing provider.  See \"Patient Info\" tab in inbasket, or \"Choose Columns\" in Meds & Orders section of the refill encounter.               Pt last seen for follow up yesterday and have upcoming f/u appointment on 7/25/23.     Thanks!  Manjinder Cook RN   [facility]    "

## 2023-07-13 LAB
C3 SERPL-MCNC: 161 MG/DL (ref 81–157)
C4 SERPL-MCNC: 42 MG/DL (ref 13–39)

## 2023-07-14 DIAGNOSIS — E11.3299 TYPE 2 DIABETES MELLITUS WITH MILD NONPROLIFERATIVE RETINOPATHY WITHOUT MACULAR EDEMA, WITH LONG-TERM CURRENT USE OF INSULIN, UNSPECIFIED LATERALITY (H): ICD-10-CM

## 2023-07-14 DIAGNOSIS — Z79.4 TYPE 2 DIABETES MELLITUS WITH MILD NONPROLIFERATIVE RETINOPATHY WITHOUT MACULAR EDEMA, WITH LONG-TERM CURRENT USE OF INSULIN, UNSPECIFIED LATERALITY (H): ICD-10-CM

## 2023-07-14 LAB
CENTROMERE B AB SER-ACNC: 13 U/ML
CENTROMERE B AB SER-ACNC: POSITIVE
DSDNA AB SER-ACNC: <0.6 IU/ML
ENA SCL70 IGG SER IA-ACNC: <0.6 U/ML
ENA SCL70 IGG SER IA-ACNC: NEGATIVE
ENA SM IGG SER IA-ACNC: <0.7 U/ML
ENA SM IGG SER IA-ACNC: NEGATIVE
MITOCHONDRIA M2 IGG SER-ACNC: <1 U/ML
U1 SNRNP IGG SER IA-ACNC: <1.1 U/ML
U1 SNRNP IGG SER IA-ACNC: NEGATIVE

## 2023-07-14 RX ORDER — INSULIN ADMIN. SUPPLIES
INSULIN PEN (EA) SUBCUTANEOUS
Qty: 200 EACH | Refills: 3 | Status: SHIPPED | OUTPATIENT
Start: 2023-07-14 | End: 2023-11-13

## 2023-07-14 NOTE — TELEPHONE ENCOUNTER
"Requested Prescriptions   Pending Prescriptions Disp Refills     NOVOFINE AUTOCOVER PEN NEEDLE 30G X 8 MM miscellaneous [Pharmacy Med Name: NovoFine Autocover 30G X 8 MM MISC]       Sig: USE 6 DAILY OR AS DIRECTED       Diabetic Supplies Protocol Passed - 7/14/2023  9:57 AM        Passed - Medication is active on med list        Passed - Patient is 18 years of age or older        Passed - Recent (6 mo) or future (30 days) visit within the authorizing provider's specialty     Patient had office visit in the last 6 months or has a visit in the next 30 days with authorizing provider.  See \"Patient Info\" tab in inbasket, or \"Choose Columns\" in Meds & Orders section of the refill encounter.               Prescription approved per [facility] Refill Protocol.  Ulices Jasmine RN  [facility]     "

## 2023-07-25 ENCOUNTER — OFFICE VISIT (OUTPATIENT)
Dept: FAMILY MEDICINE | Facility: CLINIC | Age: 63
End: 2023-07-25
Attending: FAMILY MEDICINE
Payer: COMMERCIAL

## 2023-07-25 VITALS
HEIGHT: 60 IN | WEIGHT: 243 LBS | DIASTOLIC BLOOD PRESSURE: 78 MMHG | OXYGEN SATURATION: 95 % | HEART RATE: 82 BPM | BODY MASS INDEX: 47.71 KG/M2 | TEMPERATURE: 98.3 F | RESPIRATION RATE: 19 BRPM | SYSTOLIC BLOOD PRESSURE: 126 MMHG

## 2023-07-25 DIAGNOSIS — M35.3 PMR (POLYMYALGIA RHEUMATICA) (H): Primary | ICD-10-CM

## 2023-07-25 DIAGNOSIS — E11.22 TYPE 2 DIABETES MELLITUS WITH STAGE 3A CHRONIC KIDNEY DISEASE, WITH LONG-TERM CURRENT USE OF INSULIN (H): ICD-10-CM

## 2023-07-25 DIAGNOSIS — R76.8 POSITIVE ANA (ANTINUCLEAR ANTIBODY): ICD-10-CM

## 2023-07-25 DIAGNOSIS — N18.31 TYPE 2 DIABETES MELLITUS WITH STAGE 3A CHRONIC KIDNEY DISEASE, WITH LONG-TERM CURRENT USE OF INSULIN (H): ICD-10-CM

## 2023-07-25 DIAGNOSIS — I25.10 CORONARY ARTERY DISEASE INVOLVING NATIVE CORONARY ARTERY OF NATIVE HEART WITHOUT ANGINA PECTORIS: ICD-10-CM

## 2023-07-25 DIAGNOSIS — Z79.4 TYPE 2 DIABETES MELLITUS WITH STAGE 3A CHRONIC KIDNEY DISEASE, WITH LONG-TERM CURRENT USE OF INSULIN (H): ICD-10-CM

## 2023-07-25 PROCEDURE — 99214 OFFICE O/P EST MOD 30 MIN: CPT | Performed by: FAMILY MEDICINE

## 2023-07-25 ASSESSMENT — PATIENT HEALTH QUESTIONNAIRE - PHQ9
SUM OF ALL RESPONSES TO PHQ QUESTIONS 1-9: 7
SUM OF ALL RESPONSES TO PHQ QUESTIONS 1-9: 7

## 2023-07-25 ASSESSMENT — PAIN SCALES - GENERAL: PAINLEVEL: NO PAIN (0)

## 2023-07-25 NOTE — PROGRESS NOTES
Assessment & Plan     PMR (polymyalgia rheumatica) (H)  At this point in time the patient seems to be doing a lot better.  She endorses significant pain relief from the staff accompanying her today reports that she has become significantly more independent in activities at the facility.  Edema in the hands and lower extremities has also improved.  I do suspect today that she had developed polymyalgia rheumatica.  We deferred blood work today but will check inflammatory markers again at the next visit and keep her on 15 mg of prednisone until that time.  I think we should be able to taper the prednisone but will also need to keep an eye on her diabetes control at this time.  Follow-up as scheduled.    Coronary artery disease involving native coronary artery of native heart without angina pectoris  No anginal symptoms at this time.    Type 2 diabetes mellitus with stage 3a chronic kidney disease, with long-term current use of insulin (H)  Blood sugars have been elevated understandably on the prednisone but seems to do better when she spends the day at the facility compared to when she goes to the day program so we will continue to monitor.    Positive AIDA (antinuclear antibody)  I had an opportunity to review her chart after the last visit.  She had seen rheumatology at the [facility] based on these test results and they felt she did have some type of an inflammatory arthritis and was started on leflunomide.  That rheumatologist then left the organization and she followed up with a rheumatologist at an outside organization, [facility].  That rheumatologist did not feel that the antinuclear antibody represented an inflammatory arthritis as all follow-up testing was essentially negative and they felt her symptoms were more consistent with rotator cuff arthropathy, gave her some steroid shots, noted improvement in symptoms.  Her laboratory testing is really pretty much the same at this time with  positive antinuclear antibody but follow-up testing based upon the antibody pattern, essentially negative except for anticentromere antibodies.  At that time previously when seen for rheumatology she was evaluated for scleroderma and not felt to have any symptoms consistent with that and I would agree at this time, 4 years later that interval symptoms suggestive of a condition like that if not developed.    Review of prior external note(s) from - [facility]Everywhere information from [facility]  reviewed       Blood sugar testing frequency justification:  Adjustment of medication(s)      Albino Rainey MD  [facility] 63 [facility], presenting for the following health issues:  Follow Up        7/25/2023     2:34 PM   Additional Questions   Roomed by Mary Lanza   Accompanied by Praveena HOBBS     History of Present Illness       Diabetes:   She presents for follow up of diabetes.  She is checking home blood glucose four or more times daily.   She checks blood glucose before and after meals and at bedtime.  Blood glucose is sometimes over 200 and sometimes under 70. She is aware of hypoglycemia symptoms including dizziness and other.   She is concerned about blood sugar frequently over 200.   She is having excessive thirst, blurry vision and weight loss.            Reason for visit:  Recheck    She eats 0-1 servings of fruits and vegetables daily.She consumes 1 sweetened beverage(s) daily.   She is taking medications regularly.        Patient is here today for follow-up.  She reports that she is doing better at this time.  She says that her aches and pains are a lot better.  Her appetite is good.  She is sleeping well.  The staff accompanying her today reports that she has a much brighter affect, fewer complaints of pain, and is functioning much more independently at the facility.  Previously she was also most needing a two-person assist to carry out any IADLs  "and ADLs.  At this time she is pretty much independent in all of those activities again.  They did express some concern regarding her blood sugars but notes that they tend to be elevated only on days where she goes to her day program and are only rarely over 150 when she spends the day at the facility.  Staff report that the patient's mental health provider had commented to the patient that she has not seen her in such a good mood for a long time.      Review of Systems   Constitutional, HEENT, cardiovascular, pulmonary, gi and gu systems are negative, except as otherwise noted.      Objective    /78 (BP Location: Right arm, Patient Position: Sitting, Cuff Size: Adult Large)   Pulse 82   Temp 98.3  F (36.8  C) (Temporal)   Resp 19   Ht 1.53 m (5' 0.24\")   Wt 110.2 kg (243 lb)   LMP 01/06/2015 (Exact Date)   SpO2 95%   BMI 47.09 kg/m    Body mass index is 47.09 kg/m .  Physical Exam   GENERAL: healthy, alert and no distress, some weight gain, mild moon facies, and prominence to the cervical fat pad.  EYES: Eyes grossly normal to inspection, PERRL and conjunctivae and sclerae normal  NECK: no adenopathy, no asymmetry, masses, or scars and thyroid normal to palpation  RESP: lungs clear to auscultation - no rales, rhonchi or wheezes  CV: regular rate and rhythm, normal S1 S2, no S3 or S4, no murmur, click or rub, no peripheral edema and peripheral pulses strong  MS: no gross musculoskeletal defects noted, no edema  MS: No evidence of synovitis is appreciated.  Squeeze test to the metacarpals bilaterally is negative for any pain.  SKIN: no suspicious lesions or rashes  NEURO: Normal strength and tone, mentation intact and speech normal  BACK: no CVA tenderness, no paralumbar tenderness  PSYCH: mentation appears normal, affect normal/bright, affect is markedly brighter compared to 2 months ago when we started this.    Office Visit on 07/11/2023   Component Date Value Ref Range Status    DNA (ds) Antibody " 07/11/2023 <0.6  <10.0 IU/mL Final    Negative    Sodium 07/11/2023 141  136 - 145 mmol/L Final    Potassium 07/11/2023 4.3  3.4 - 5.3 mmol/L Final    Chloride 07/11/2023 105  98 - 107 mmol/L Final    Carbon Dioxide (CO2) 07/11/2023 23  22 - 29 mmol/L Final    Anion Gap 07/11/2023 13  7 - 15 mmol/L Final    Urea Nitrogen 07/11/2023 24.0 (H)  8.0 - 23.0 mg/dL Final    Creatinine 07/11/2023 1.43 (H)  0.51 - 0.95 mg/dL Final    Calcium 07/11/2023 9.6  8.8 - 10.2 mg/dL Final    Glucose 07/11/2023 132 (H)  70 - 99 mg/dL Final    GFR Estimate 07/11/2023 41 (L)  >60 mL/min/1.73m2 Final    CK 07/11/2023 60  26 - 192 U/L Final    Scl-70 Mary IgG Instrument Value 07/11/2023 <0.6  <7.0 U/mL Final    Scl-70 Antibody IgG 07/11/2023 Negative  Negative Final    Centromere Mary IgG Instrument Value 07/11/2023 13.0 (H)  <7.0 U/mL Final    Centromere Antibody IgG 07/11/2023 Positive (A)  Negative Final    RNP Mary IgG Instrument Value 07/11/2023 <1.1  <5.0 U/mL Final    RNP Antibody IgG 07/11/2023 Negative  Negative Final    Mitochondrial M2 Antibody IgG 07/11/2023 <1.0  <4.0 U/mL Final    Negative    WBC Count 07/11/2023 6.1  4.0 - 11.0 10e3/uL Final    RBC Count 07/11/2023 3.14 (L)  3.80 - 5.20 10e6/uL Final    Hemoglobin 07/11/2023 10.3 (L)  11.7 - 15.7 g/dL Final    Hematocrit 07/11/2023 32.9 (L)  35.0 - 47.0 % Final    MCV 07/11/2023 105 (H)  78 - 100 fL Final    MCH 07/11/2023 32.8  26.5 - 33.0 pg Final    MCHC 07/11/2023 31.3 (L)  31.5 - 36.5 g/dL Final    RDW 07/11/2023 13.2  10.0 - 15.0 % Final    Platelet Count 07/11/2023 174  150 - 450 10e3/uL Final    % Neutrophils 07/11/2023 62  % Final    % Lymphocytes 07/11/2023 28  % Final    % Monocytes 07/11/2023 6  % Final    % Eosinophils 07/11/2023 1  % Final    % Basophils 07/11/2023 0  % Final    % Immature Granulocytes 07/11/2023 2  % Final    Absolute Neutrophils 07/11/2023 3.8  1.6 - 8.3 10e3/uL Final    Absolute Lymphocytes 07/11/2023 1.7  0.8 - 5.3 10e3/uL Final    Absolute  Monocytes 07/11/2023 0.4  0.0 - 1.3 10e3/uL Final    Absolute Eosinophils 07/11/2023 0.1  0.0 - 0.7 10e3/uL Final    Absolute Basophils 07/11/2023 0.0  0.0 - 0.2 10e3/uL Final    Absolute Immature Granulocytes 07/11/2023 0.1  <=0.4 10e3/uL Final    Vogt VIJAY Mary IgG Instrument Value 07/11/2023 <0.7  <7.0 U/mL Final    Vogt VIJAY Antibody IgG 07/11/2023 Negative  Negative Final    C3 Complement 07/11/2023 161 (H)  81 - 157 mg/dL Final    C4 Complement 07/11/2023 42 (H)  13 - 39 mg/dL Final

## 2023-07-26 NOTE — TELEPHONE ENCOUNTER
Requested records from group home from past 7 days of morning fasting BS, received & given to MTM.  Wendy Schwartz RN   [facility]        Pt Name: Phuong Mckinney  MRN: [id number]  [address] [facility] [place] 1968  Date of evaluation: 7/26/2023  Provider: Delvin Merino PA-C  PCP: Lynsey Grant DO  Note Started: 6:14 PM EDT 7/26/23      ERIN. I have evaluated this patient. CHIEF COMPLAINT       Chief Complaint   Patient presents with    Animal Bite     Pt via  from home, pt was attacked by her cat, chunks of legs taken out with claws and teeth, bleeding controlled at triage. Cat is an outdoor and indoor cat with no vaccination, cat has had an illness for a week, pt does not have rabies or tetanus shot       HISTORY OF PRESENT ILLNESS: 1 or more Elements     History from : Patient    Limitations to history : None    Phuong Mckinney is a 49 y.o. female who presents to the Emergency Department today stating her Bit and scratched her right lower leg just prior to arrival.  She has 2 small lacerations and 3 tiny puncture wounds to her right lower leg. There is no active bleeding. There are no signs of infection. She denies numbness or tingling. Nursing Notes were all reviewed and agreed with or any disagreements were addressed in the HPI. REVIEW OF SYSTEMS :      Review of Systems   Constitutional:  Negative for chills and fever. Respiratory:  Negative for chest tightness and shortness of breath. Cardiovascular:  Negative for chest pain. Gastrointestinal:  Negative for abdominal pain, diarrhea, nausea and vomiting. Genitourinary:  Negative for dysuria. Skin:  Positive for wound. Neurological:  Negative for numbness. All other systems reviewed and are negative. Positives and Pertinent negatives as per HPI.      SURGICAL HISTORY     Past Surgical History:   Procedure Laterality Date    MOUTH SURGERY         CURRENTMEDICATIONS       Previous Medications    AMLODIPINE (NORVASC) 5 MG TABLET    Take 5 mg by mouth daily

## 2023-08-02 NOTE — TELEPHONE ENCOUNTER
Left detailed message with [facility] for skilled nursing home care orders on the secure vm of DAVIN Saenz RN   [facility]         Stopped going to dialysis due to gout flare up missed 3 sessions, had first tx of dialysis today

## 2023-08-07 ENCOUNTER — OFFICE VISIT (OUTPATIENT)
Dept: UROLOGY | Facility: CLINIC | Age: 63
End: 2023-08-07
Payer: COMMERCIAL

## 2023-08-07 VITALS — WEIGHT: 243 LBS | BODY MASS INDEX: 47.71 KG/M2 | HEIGHT: 60 IN

## 2023-08-07 DIAGNOSIS — R35.0 URINARY FREQUENCY: Primary | ICD-10-CM

## 2023-08-07 PROCEDURE — 99214 OFFICE O/P EST MOD 30 MIN: CPT | Performed by: PHYSICIAN ASSISTANT

## 2023-08-07 RX ORDER — MIRABEGRON 50 MG/1
50 TABLET, EXTENDED RELEASE ORAL DAILY
Qty: 90 TABLET | Refills: 4 | Status: SHIPPED | OUTPATIENT
Start: 2023-08-07 | End: 2024-08-27

## 2023-08-07 RX ORDER — ESTRADIOL 0.1 MG/G
CREAM VAGINAL
Qty: 42.5 G | Refills: 3 | Status: ON HOLD | OUTPATIENT
Start: 2023-08-07 | End: 2023-09-20

## 2023-08-07 ASSESSMENT — PAIN SCALES - GENERAL: PAINLEVEL: SEVERE PAIN (7)

## 2023-08-07 NOTE — PATIENT INSTRUCTIONS
Increase mirabegron to 50mg daily.    Estrogen cream three times a week near urethra (pea-sized amount): If >$50, let me know and we can get via a compound pharmacy or [facility].  Takes 3 months to see if helpful!

## 2023-08-07 NOTE — PROGRESS NOTES
"  CC: dysuria, leakage    HPI:  Nena \"Ania\" Kitty is a pleasant 63 year old female who presents in follow-up of the above. At time of initial consult, had dysuria, which ws intermittent and ongoing for several months. Last UAs neg (2021) Wears depends. Has had leakage for several years as well as urgency and nocturia. No gross hematuria.     On Myrbetriq 25 mg daily. Declined topical estrogen last time. Wears less Depends. Has helped some, but getting more freq. Last 3 UCs with mixed.     Past Medical History:   Diagnosis Date    Acute respiratory failure with hypoxia (H) 9/4/2017    CAD (coronary artery disease)     5/2014 cath, nonbostructive stenosis to LAD, RCA.    Chronic low back pain 1/22/2013    Cocaine abuse, in remission (H)     Fecal urgency 3/8/2012    History of heroin abuse (H)     Hyperlipidemia LDL goal <100 10/31/2010    Hypertension 7/29/2013    Illiterate 8/30/2011    Irritable bowel syndrome     Left cataract     Migraine 4/19/2012    Migraine headache 4/22/2013    Moderate major depression (H) 6/8/2011    Noncompliance with medication regimen 6/8/2011    Obesity     CINDY (obstructive sleep apnea) 3/8/2012    uses CPAP    CINDY (obstructive sleep apnea)- mild AHI 10.3     Osteopenia 10/7/2009    Pneumonia of right lower lobe due to infectious organism 9/4/2017    Schizoaffective disorder, depressive type (H) 2/25/2013    Sepsis (H) 8/29/2017    Suicidal intent 10/2/2013    Takotsubo cardiomyopathy     Type 2 diabetes mellitus (H) 8/30/2011    Uterine cancer (H) 1983    Verbal auditory hallucination 10/4/2012       Past Surgical History:   Procedure Laterality Date    CATARACT IOL, RT/LT Bilateral 2017    CHOLECYSTECTOMY      COLONOSCOPY N/A 3/16/2017    Procedure: COLONOSCOPY;  Surgeon: Traci Gonzalez MD;  Location: [facility]    Coronary CTA  5/21/2014    HYSTERECTOMY  1983    uterine cancer yearly pap's per provider.    HYSTERECTOMY      LAPAROSCOPIC CHOLECYSTECTOMY  2008    " PHACOEMULSIFICATION CLEAR CORNEA WITH STANDARD INTRAOCULAR LENS IMPLANT Left 5/5/2017    Procedure: PHACOEMULSIFICATION CLEAR CORNEA WITH STANDARD INTRAOCULAR LENS IMPLANT;  LEFT EYE PHACOEMULSIFICATION CLEAR CORNEA WITH STANDARD INTRAOCULAR LENS IMPLANT ;  Surgeon: Tyra Diaz MD;  Location:  EC    PHACOEMULSIFICATION CLEAR CORNEA WITH STANDARD INTRAOCULAR LENS IMPLANT Right 6/30/2017    Procedure: PHACOEMULSIFICATION CLEAR CORNEA WITH STANDARD INTRAOCULAR LENS IMPLANT;  RIGHT EYE PHACOEMULSIFICATION CLEAR CORNEA WITH STANDARD INTRAOCULAR LENS IMPLANT;  Surgeon: Tyra Diaz MD;  Location:  EC    RELEASE TRIGGER FINGER  10/11/2012    Left thumb. Procedure: RELEASE TRIGGER FINGER;  LEFT THUMB TRIGGER RELEASE;  Surgeon: Tay Langley MD;  Location:  SD    RELEASE TRIGGER FINGER Right 12/26/2016    Procedure: RELEASE TRIGGER FINGER;  Surgeon: Albino Castañeda MD;  Location:  OR    [facility] OOPHORECTORMY FOR JALENIG, W/BX  1983    UTERINE       Social History     Socioeconomic History    Marital status:      Spouse name: Not on file    Number of children: 2    Years of education: Not on file    Highest education level: Not on file   Occupational History    Not on file   Tobacco Use    Smoking status: Never    Smokeless tobacco: Never   Vaping Use    Vaping Use: Never used   Substance and Sexual Activity    Alcohol use: Not Currently     Comment: when younger    Drug use: No     Comment: history of    Sexual activity: Not Currently     Partners: Male     Birth control/protection: None, Condom   Other Topics Concern    Parent/sibling w/ CABG, MI or angioplasty before 65F 55M? Not Asked     Service No    Blood Transfusions No    Caffeine Concern No    Occupational Exposure No    Hobby Hazards No    Sleep Concern Yes    Stress Concern Yes    Weight Concern Yes    Special Diet Yes     Comment: DM    Back Care Yes    Exercise Yes     Comment: WALKS DAILY    Bike Helmet Not Asked    Seat  Belt Yes    Self-Exams No     Comment: ENCOURAGED   Social History Narrative     10/2014. Has 2 sons. 7 grandchildren.         Unemployed. Graduated HS.         Tobacco use: Denies    Alcohol use: Escalated use since   10/2014    Drug: Denies     Social Determinants of Health     Financial Resource Strain: Not on file   Food Insecurity: Not on file   Transportation Needs: Not on file   Physical Activity: Not on file   Stress: Not on file   Social Connections: Not on file   Intimate Partner Violence: Not At Risk (2022)    Humiliation, Afraid, Rape, and Kick questionnaire     Fear of Current or Ex-Partner: No     Emotionally Abused: No     Physically Abused: No     Sexually Abused: No   Housing Stability: Not on file       Family History   Problem Relation Age of Onset    Cancer Mother         BLADDER    Respiratory Mother         COPD    Gastrointestinal Disease Mother         CIRRHOSIS OF LI BOLIVAR    Alcohol/Drug Mother     Diabetes Mother     Hypertension Mother     Lipids Mother     C.A.D. Mother     Glaucoma Mother     Alcohol/Drug Sister     Mental Illness Sister     Alcohol/Drug Sister     Psychotic Disorder Sister     Cancer Maternal Grandmother         UNKNOWN TYPE    Cancer Brother         COLON    Cancer - colorectal Brother         IN HIS LATE 30S    Alcohol/Drug Brother          OF HEROIN OVERDOSE AT AGE 22 YRS    Macular Degeneration No family hx of        Allergies   Allergen Reactions    Imidazole Antifungals Hives     Tolerates diflucan    Ketoprofen Itching     Pruritis to topical    Lisinopril Hives    Metformin Other (See Comments)     Patient hospitalized for lactic acidosis - admitting provider suspectd caused by metformin    Metronidazole Hives    Posaconazole Hives     Tolerates diflucan       Current Outpatient Medications   Medication    MYRBETRIQ 25 MG 24 hr tablet    acetaminophen (TYLENOL) 500 MG tablet    Alcohol Swabs (EASY TOUCH ALCOHOL PREP MEDIUM) 70 % PADS     alendronate (FOSAMAX) 70 MG tablet    amantadine (SYMMETREL) 100 MG capsule    aspirin (ASPIRIN LOW DOSE) 81 MG EC tablet    atorvastatin (LIPITOR) 80 MG tablet    blood glucose (NO BRAND SPECIFIED) test strip    calcium carbonate (OS-ALLEN) 1500 (600 Ca) MG tablet    clonazePAM (KLONOPIN) 0.5 MG tablet    Continuous Blood Gluc Sensor (DEXCOM G6 SENSOR) MISC    Continuous Blood Gluc Transmit (DEXCOM G6 TRANSMITTER) MISC    diclofenac (VOLTAREN) 1 % topical gel    docusate sodium (COLACE) 100 MG capsule    escitalopram (LEXAPRO) 20 MG tablet    gabapentin (NEURONTIN) 300 MG capsule    hydrOXYzine (VISTARIL) 25 MG capsule    insulin glargine (LANTUS PEN) 100 UNIT/ML pen    insulin lispro (HUMALOG KWIKPEN) 100 UNIT/ML (1 unit dial) KWIKPEN    losartan (COZAAR) 100 MG tablet    magnesium 250 MG tablet    metoprolol succinate ER (TOPROL XL) 50 MG 24 hr tablet    NOVOFINE AUTOCOVER PEN NEEDLE 30G X 8 MM miscellaneous    nystatin (MYCOSTATIN) 320113 UNIT/GM external powder    ondansetron (ZOFRAN) 4 MG tablet    OneTouch Delica Lancets 33G MISC    order for DME    order for DME    paliperidone ER (INVEGA) 9 MG 24 hr tablet    pantoprazole (PROTONIX) 40 MG EC tablet    prazosin (MINIPRESS) 1 MG capsule    predniSONE (DELTASONE) 5 MG tablet    QUEtiapine (SEROQUEL) 100 MG tablet    senna-docusate (SENOKOT-S/PERICOLACE) 8.6-50 MG tablet    thin (NO BRAND SPECIFIED) lancets    traZODone (DESYREL) 100 MG tablet    TRULICITY 3 MG/0.5ML SOPN    vitamin C (ASCORBIC ACID) 500 MG tablet    zinc oxide (DESITIN) 20 % external ointment     Current Facility-Administered Medications   Medication    apraclonidine (IOPIDINE) 1 % ophthalmic solution 1 drop    lidocaine 1 % injection 4 mL    triamcinolone (KENALOG-40) injection 40 mg         PEx:   Ht 1.524 m (5')   Wt 110.2 kg (243 lb)   LMP 01/06/2015 (Exact Date)   BMI 47.46 kg/m      PSYCH: NAD    A/P: Nena FRANKS Kitty is a 63 year old female with urge incontinence  -UA/UC is  symptoms  -Estrogen cream three times a week near urethra (pea-sized amount)  -Myrbetriq increase to 50 mg daily.   -follow-up PRN    Gisselle Nash PA-C  [facility]      26 minutes spent on the date of the encounter doing chart review, review of outside records, review of test results, interpretation of tests, patient visit and documentation

## 2023-08-07 NOTE — LETTER
"8/7/2023       RE: Nena Tang  [address] [place] [address]     Dear Colleague,    Thank you for referring your patient, Nena Tang, to the [facility]. Please see a copy of my visit note below.    Pt here for myrbetriq refill.    Pt denies any voiding problems or dysuria.  YORDY Juarez CMA        CC: dysuria, leakage    HPI:  Nena \"Ania\" Kitty is a pleasant 63 year old female who presents in follow-up of the above. At time of initial consult, had dysuria, which ws intermittent and ongoing for several months. Last UAs neg (2021) Wears depends. Has had leakage for several years as well as urgency and nocturia. No gross hematuria.     On Myrbetriq 25 mg daily. Declined topical estrogen last time. Wears less Depends. Has helped some, but getting more freq. Last 3 UCs with mixed.     Past Medical History:   Diagnosis Date    Acute respiratory failure with hypoxia (H) 9/4/2017    CAD (coronary artery disease)     5/2014 cath, nonbostructive stenosis to LAD, RCA.    Chronic low back pain 1/22/2013    Cocaine abuse, in remission (H)     Fecal urgency 3/8/2012    History of heroin abuse (H)     Hyperlipidemia LDL goal <100 10/31/2010    Hypertension 7/29/2013    Illiterate 8/30/2011    Irritable bowel syndrome     Left cataract     Migraine 4/19/2012    Migraine headache 4/22/2013    Moderate major depression (H) 6/8/2011    Noncompliance with medication regimen 6/8/2011    Obesity     CINDY (obstructive sleep apnea) 3/8/2012    uses CPAP    CINDY (obstructive sleep apnea)- mild AHI 10.3     Osteopenia 10/7/2009    Pneumonia of right lower lobe due to infectious organism 9/4/2017    Schizoaffective disorder, depressive type (H) 2/25/2013    Sepsis (H) 8/29/2017    Suicidal intent 10/2/2013    Takotsubo cardiomyopathy     Type 2 diabetes mellitus (H) 8/30/2011    Uterine cancer (H) 1983    Verbal auditory " hallucination 10/4/2012       Past Surgical History:   Procedure Laterality Date    CATARACT IOL, RT/LT Bilateral 2017    CHOLECYSTECTOMY      COLONOSCOPY N/A 3/16/2017    Procedure: COLONOSCOPY;  Surgeon: Traci Gonzalez MD;  Location:  GI    Coronary CTA  5/21/2014    HYSTERECTOMY  1983    uterine cancer yearly pap's per provider.    HYSTERECTOMY      LAPAROSCOPIC CHOLECYSTECTOMY  2008    PHACOEMULSIFICATION CLEAR CORNEA WITH STANDARD INTRAOCULAR LENS IMPLANT Left 5/5/2017    Procedure: PHACOEMULSIFICATION CLEAR CORNEA WITH STANDARD INTRAOCULAR LENS IMPLANT;  LEFT EYE PHACOEMULSIFICATION CLEAR CORNEA WITH STANDARD INTRAOCULAR LENS IMPLANT ;  Surgeon: Tyra Diaz MD;  Location:  [facility]    PHACOEMULSIFICATION CLEAR CORNEA WITH STANDARD INTRAOCULAR LENS IMPLANT Right 6/30/2017    Procedure: PHACOEMULSIFICATION CLEAR CORNEA WITH STANDARD INTRAOCULAR LENS IMPLANT;  RIGHT EYE PHACOEMULSIFICATION CLEAR CORNEA WITH STANDARD INTRAOCULAR LENS IMPLANT;  Surgeon: Tyra Diaz MD;  Location:  EC    RELEASE TRIGGER FINGER  10/11/2012    Left thumb. Procedure: RELEASE TRIGGER FINGER;  LEFT THUMB TRIGGER RELEASE;  Surgeon: Tay Langley MD;  Location:  [facility]    RELEASE TRIGGER FINGER Right 12/26/2016    Procedure: RELEASE TRIGGER FINGER;  Surgeon: Albino Castañeda MD;  Location:  OR    [facility] OOPHORECTORMY FOR MALIG, W/BX  1983    UTERINE       Social History     Socioeconomic History    Marital status:      Spouse name: Not on file    Number of children: 2    Years of education: Not on file    Highest education level: Not on file   Occupational History    Not on file   Tobacco Use    Smoking status: Never    Smokeless tobacco: Never   Vaping Use    Vaping Use: Never used   Substance and Sexual Activity    Alcohol use: Not Currently     Comment: when younger    Drug use: No     Comment: history of    Sexual activity: Not Currently     Partners: Male     Birth control/protection: None,  Condom   Other Topics Concern    Parent/sibling w/ CABG, MI or angioplasty before 65F 55M? Not Asked     Service No    Blood Transfusions No    Caffeine Concern No    Occupational Exposure No    Hobby Hazards No    Sleep Concern Yes    Stress Concern Yes    Weight Concern Yes    Special Diet Yes     Comment: DM    Back Care Yes    Exercise Yes     Comment: WALKS DAILY    Bike Helmet Not Asked    Seat Belt Yes    Self-Exams No     Comment: ENCOURAGED   Social History Narrative     10/2014. Has 2 sons. 7 grandchildren.         Unemployed. Graduated HS.         Tobacco use: Denies    Alcohol use: Escalated use since   10/2014    Drug: Denies     Social Determinants of Health     Financial Resource Strain: Not on file   Food Insecurity: Not on file   Transportation Needs: Not on file   Physical Activity: Not on file   Stress: Not on file   Social Connections: Not on file   Intimate Partner Violence: Not At Risk (2022)    Humiliation, Afraid, Rape, and Kick questionnaire     Fear of Current or Ex-Partner: No     Emotionally Abused: No     Physically Abused: No     Sexually Abused: No   Housing Stability: Not on file       Family History   Problem Relation Age of Onset    Cancer Mother         BLADDER    Respiratory Mother         COPD    Gastrointestinal Disease Mother         CIRRHOSIS OF LI BOLIVAR    Alcohol/Drug Mother     Diabetes Mother     Hypertension Mother     Lipids Mother     C.A.D. Mother     Glaucoma Mother     Alcohol/Drug Sister     Mental Illness Sister     Alcohol/Drug Sister     Psychotic Disorder Sister     Cancer Maternal Grandmother         UNKNOWN TYPE    Cancer Brother         COLON    Cancer - colorectal Brother         IN HIS LATE 30S    Alcohol/Drug Brother          OF HEROIN OVERDOSE AT AGE 22 YRS    Macular Degeneration No family hx of        Allergies   Allergen Reactions    Imidazole Antifungals Hives     Tolerates diflucan    Ketoprofen Itching     Pruritis  to topical    Lisinopril Hives    Metformin Other (See Comments)     Patient hospitalized for lactic acidosis - admitting provider suspectd caused by metformin    Metronidazole Hives    Posaconazole Hives     Tolerates diflucan       Current Outpatient Medications   Medication    MYRBETRIQ 25 MG 24 hr tablet    acetaminophen (TYLENOL) 500 MG tablet    Alcohol Swabs (EASY TOUCH ALCOHOL PREP MEDIUM) 70 % PADS    alendronate (FOSAMAX) 70 MG tablet    amantadine (SYMMETREL) 100 MG capsule    aspirin (ASPIRIN LOW DOSE) 81 MG EC tablet    atorvastatin (LIPITOR) 80 MG tablet    blood glucose (NO BRAND SPECIFIED) test strip    calcium carbonate (OS-ALLEN) 1500 (600 Ca) MG tablet    clonazePAM (KLONOPIN) 0.5 MG tablet    Continuous Blood Gluc Sensor (DEXCOM G6 SENSOR) MISC    Continuous Blood Gluc Transmit (DEXCOM G6 TRANSMITTER) MISC    diclofenac (VOLTAREN) 1 % topical gel    docusate sodium (COLACE) 100 MG capsule    escitalopram (LEXAPRO) 20 MG tablet    gabapentin (NEURONTIN) 300 MG capsule    hydrOXYzine (VISTARIL) 25 MG capsule    insulin glargine (LANTUS PEN) 100 UNIT/ML pen    insulin lispro (HUMALOG KWIKPEN) 100 UNIT/ML (1 unit dial) KWIKPEN    losartan (COZAAR) 100 MG tablet    magnesium 250 MG tablet    metoprolol succinate ER (TOPROL XL) 50 MG 24 hr tablet    NOVOFINE AUTOCOVER PEN NEEDLE 30G X 8 MM miscellaneous    nystatin (MYCOSTATIN) 353516 UNIT/GM external powder    ondansetron (ZOFRAN) 4 MG tablet    OneTouch Delica Lancets 33G MISC    order for DME    order for DME    paliperidone ER (INVEGA) 9 MG 24 hr tablet    pantoprazole (PROTONIX) 40 MG EC tablet    prazosin (MINIPRESS) 1 MG capsule    predniSONE (DELTASONE) 5 MG tablet    QUEtiapine (SEROQUEL) 100 MG tablet    senna-docusate (SENOKOT-S/PERICOLACE) 8.6-50 MG tablet    thin (NO BRAND SPECIFIED) lancets    traZODone (DESYREL) 100 MG tablet    TRULICITY 3 MG/0.5ML SOPN    vitamin C (ASCORBIC ACID) 500 MG tablet    zinc oxide (DESITIN) 20 % external  ointment     Current Facility-Administered Medications   Medication    apraclonidine (IOPIDINE) 1 % ophthalmic solution 1 drop    lidocaine 1 % injection 4 mL    triamcinolone (KENALOG-40) injection 40 mg         PEx:   Ht 1.524 m (5')   Wt 110.2 kg (243 lb)   LMP 01/06/2015 (Exact Date)   BMI 47.46 kg/m      PSYCH: NAD    A/P: Nena Tang is a 63 year old female with urge incontinence  -UA/UC is symptoms  -Estrogen cream three times a week near urethra (pea-sized amount)  -Myrbetriq increase to 50 mg daily.   -follow-up PRN    Gisselle Nash PA-C  [facility]      26 minutes spent on the date of the encounter doing chart review, review of outside records, review of test results, interpretation of tests, patient visit and documentation

## 2023-08-10 DIAGNOSIS — E08.3292: Primary | ICD-10-CM

## 2023-08-17 ENCOUNTER — HOSPITAL ENCOUNTER (EMERGENCY)
Facility: CLINIC | Age: 63
Discharge: HOME | End: 2023-08-17
Attending: EMERGENCY MEDICINE | Admitting: EMERGENCY MEDICINE
Payer: COMMERCIAL

## 2023-08-17 ENCOUNTER — APPOINTMENT (OUTPATIENT)
Dept: CT IMAGING | Facility: CLINIC | Age: 63
End: 2023-08-17
Attending: EMERGENCY MEDICINE
Payer: COMMERCIAL

## 2023-08-17 VITALS
OXYGEN SATURATION: 96 % | TEMPERATURE: 99.3 F | HEART RATE: 81 BPM | SYSTOLIC BLOOD PRESSURE: 165 MMHG | DIASTOLIC BLOOD PRESSURE: 73 MMHG | RESPIRATION RATE: 20 BRPM

## 2023-08-17 DIAGNOSIS — U07.1 INFECTION DUE TO 2019 NOVEL CORONAVIRUS: ICD-10-CM

## 2023-08-17 DIAGNOSIS — R10.9 FLANK PAIN: ICD-10-CM

## 2023-08-17 LAB
ALBUMIN SERPL BCG-MCNC: 3.9 G/DL (ref 3.5–5.2)
ALP SERPL-CCNC: 63 U/L (ref 35–104)
ALT SERPL W P-5'-P-CCNC: 31 U/L (ref 0–50)
ANION GAP SERPL CALCULATED.3IONS-SCNC: 12 MMOL/L (ref 7–15)
AST SERPL W P-5'-P-CCNC: 13 U/L (ref 0–45)
ATRIAL RATE - MUSE: 74 BPM
BASOPHILS # BLD AUTO: 0 10E3/UL (ref 0–0.2)
BASOPHILS NFR BLD AUTO: 0 %
BILIRUB SERPL-MCNC: 0.2 MG/DL
BUN SERPL-MCNC: 15 MG/DL (ref 8–23)
CALCIUM SERPL-MCNC: 9.1 MG/DL (ref 8.8–10.2)
CHLORIDE SERPL-SCNC: 100 MMOL/L (ref 98–107)
CREAT SERPL-MCNC: 1.13 MG/DL (ref 0.51–0.95)
DEPRECATED HCO3 PLAS-SCNC: 26 MMOL/L (ref 22–29)
DIASTOLIC BLOOD PRESSURE - MUSE: NORMAL MMHG
EOSINOPHIL # BLD AUTO: 0 10E3/UL (ref 0–0.7)
EOSINOPHIL NFR BLD AUTO: 0 %
ERYTHROCYTE [DISTWIDTH] IN BLOOD BY AUTOMATED COUNT: 12.8 % (ref 10–15)
GFR SERPL CREATININE-BSD FRML MDRD: 55 ML/MIN/1.73M2
GLUCOSE SERPL-MCNC: 177 MG/DL (ref 70–99)
HCT VFR BLD AUTO: 30.5 % (ref 35–47)
HGB BLD-MCNC: 9.8 G/DL (ref 11.7–15.7)
HOLD SPECIMEN: NORMAL
IMM GRANULOCYTES # BLD: 0.1 10E3/UL
IMM GRANULOCYTES NFR BLD: 1 %
INTERPRETATION ECG - MUSE: NORMAL
LYMPHOCYTES # BLD AUTO: 1.9 10E3/UL (ref 0.8–5.3)
LYMPHOCYTES NFR BLD AUTO: 21 %
MCH RBC QN AUTO: 33.3 PG (ref 26.5–33)
MCHC RBC AUTO-ENTMCNC: 32.1 G/DL (ref 31.5–36.5)
MCV RBC AUTO: 104 FL (ref 78–100)
MONOCYTES # BLD AUTO: 0.7 10E3/UL (ref 0–1.3)
MONOCYTES NFR BLD AUTO: 8 %
NEUTROPHILS # BLD AUTO: 6.1 10E3/UL (ref 1.6–8.3)
NEUTROPHILS NFR BLD AUTO: 70 %
NRBC # BLD AUTO: 0 10E3/UL
NRBC BLD AUTO-RTO: 0 /100
P AXIS - MUSE: 44 DEGREES
PLATELET # BLD AUTO: 185 10E3/UL (ref 150–450)
POTASSIUM SERPL-SCNC: 3.9 MMOL/L (ref 3.4–5.3)
PR INTERVAL - MUSE: 160 MS
PROT SERPL-MCNC: 6.3 G/DL (ref 6.4–8.3)
QRS DURATION - MUSE: 86 MS
QT - MUSE: 380 MS
QTC - MUSE: 421 MS
R AXIS - MUSE: -50 DEGREES
RBC # BLD AUTO: 2.94 10E6/UL (ref 3.8–5.2)
SARS-COV-2 RNA RESP QL NAA+PROBE: POSITIVE
SODIUM SERPL-SCNC: 138 MMOL/L (ref 136–145)
SYSTOLIC BLOOD PRESSURE - MUSE: NORMAL MMHG
T AXIS - MUSE: 9 DEGREES
VENTRICULAR RATE- MUSE: 74 BPM
WBC # BLD AUTO: 8.9 10E3/UL (ref 4–11)

## 2023-08-17 PROCEDURE — 99285 EMERGENCY DEPT VISIT HI MDM: CPT | Mod: 25

## 2023-08-17 PROCEDURE — 85014 HEMATOCRIT: CPT | Performed by: EMERGENCY MEDICINE

## 2023-08-17 PROCEDURE — 93005 ELECTROCARDIOGRAM TRACING: CPT

## 2023-08-17 PROCEDURE — 80053 COMPREHEN METABOLIC PANEL: CPT | Performed by: EMERGENCY MEDICINE

## 2023-08-17 PROCEDURE — 87635 SARS-COV-2 COVID-19 AMP PRB: CPT | Performed by: EMERGENCY MEDICINE

## 2023-08-17 PROCEDURE — 250N000011 HC RX IP 250 OP 636: Mod: JZ | Performed by: EMERGENCY MEDICINE

## 2023-08-17 PROCEDURE — 250N000011 HC RX IP 250 OP 636: Performed by: EMERGENCY MEDICINE

## 2023-08-17 PROCEDURE — 96374 THER/PROPH/DIAG INJ IV PUSH: CPT | Mod: 59

## 2023-08-17 PROCEDURE — 250N000009 HC RX 250: Performed by: EMERGENCY MEDICINE

## 2023-08-17 PROCEDURE — 36415 COLL VENOUS BLD VENIPUNCTURE: CPT | Performed by: EMERGENCY MEDICINE

## 2023-08-17 PROCEDURE — 74177 CT ABD & PELVIS W/CONTRAST: CPT

## 2023-08-17 RX ORDER — IOPAMIDOL 755 MG/ML
122 INJECTION, SOLUTION INTRAVASCULAR ONCE
Status: COMPLETED | OUTPATIENT
Start: 2023-08-17 | End: 2023-08-17

## 2023-08-17 RX ORDER — KETOROLAC TROMETHAMINE 15 MG/ML
15 INJECTION, SOLUTION INTRAMUSCULAR; INTRAVENOUS ONCE
Status: COMPLETED | OUTPATIENT
Start: 2023-08-17 | End: 2023-08-17

## 2023-08-17 RX ADMIN — KETOROLAC TROMETHAMINE 15 MG: 15 INJECTION, SOLUTION INTRAMUSCULAR; INTRAVENOUS at 05:28

## 2023-08-17 RX ADMIN — IOPAMIDOL 122 ML: 755 INJECTION, SOLUTION INTRAVENOUS at 02:41

## 2023-08-17 RX ADMIN — SODIUM CHLORIDE 100 ML: 9 INJECTION, SOLUTION INTRAVENOUS at 02:45

## 2023-08-17 ASSESSMENT — ACTIVITIES OF DAILY LIVING (ADL)
ADLS_ACUITY_SCORE: 37

## 2023-08-17 NOTE — ED TRIAGE NOTES
Right lower back pain that is worse with deep breaths. Recent travel to [place] to visit family. Patient also states she has had a cough for a couple of days.      Triage Assessment       Row Name 08/17/23 0029       Triage Assessment (Adult)    Airway WDL WDL       Respiratory WDL    Respiratory WDL X;all;cough    Rhythm/Pattern, Respiratory shortness of breath

## 2023-08-17 NOTE — ED PROVIDER NOTES
History     Chief Complaint:  Back Pain and Shortness of Breath       HPI   Nena Tang is a 63 year old female who presented to the emergency department with multiple symptoms.  She has had a cough for probably 5 to 6 days.  She has had right flank pain as well as difficulty breathing especially when she takes a deep breath.  No fever.  No vomiting.  No urinary symptoms such as urinary frequency or dysuria.  No diarrhea.  She lives in a group home.    Independent Historian:    Independent historian    Medications:    Trulicity   Desyrel seroquel  Deltsone  Minipress  Protonix  Nystatin  Toprol  Cozaar   Hydroxyzine  Gabapentin  Lexapro  Klonopin  Lipitor  Aspirin  Fosamax     Past Medical History:    Acute respiratory failure with hypoxia (H)  CAD (coronary artery disease)  Chronic low back pain  Cocaine abuse, in remission  History of heroin abuse  Hyperlipidemia  Hypertension  Irritable bowel syndrome  Left cataract  Migraine  Moderate major depression  Noncompliance with medication regimen  Obesity  CINDY (obstructive sleep apnea)  Osteopenia  Pneumonia of right lower lobe due to infectious organism  Schizoaffective disorder, depressive type  Sepsis  Suicidal intent  Takotsubo cardiomyopathy  Type 2 diabetes mellitus  Uterine cancer  Verbal auditory hallucination  Nephrolithiasis   KATY  PTSD  Cardiomyopathy   Schizoaffective   Rotator cuff syndrome     Past Surgical History:    Oophorectomy   Release trigger finger  Phacoemulsification clear cornea with standard intraocular lens implant  Cholecystectomy  Hysterectomy   Coronary CTA  Cataract removal     Physical Exam   Patient Vitals for the past 24 hrs:   BP Temp Temp src Pulse Resp SpO2   08/17/23 0029 (!) 196/75 97.8  F (36.6  C) Oral 78 20 98 %        Physical Exam  General: Sitting up in bed  Eyes:  The pupils are equal and round    Conjunctivae and sclerae are normal  ENT:    Atraumatic face  Neck:  Normal range of motion  CV:  Regular rate,  regular rhythm     Skin warm and well perfused   Resp:  Non labored breathing on room air    No tachypnea    No cough heard, lungs clear bilaterally  GI:  Abdomen is soft, there is no rigidity    No distension    No rebound tenderness     No abdominal tenderness    Tender on right flank  MS:  Normal muscular tone  Skin:  No rash or acute skin lesions noted  Neuro:   Awake, alert.      Speech is normal and fluent.    Face is symmetric.     Moves all extremities equally  Psych: Normal affect.  Appropriate interactions.    Emergency Department Course   ECG    ECG results from 08/17/23   EKG 12-lead, tracing only     Value    Systolic Blood Pressure     Diastolic Blood Pressure     Ventricular Rate 74    Atrial Rate 74    MO Interval 160    QRS Duration 86        QTc 421    P Axis 44    R AXIS -50    T Axis 9    Interpretation ECG      Sinus rhythm  Left axis deviation  Anterior infarct , age undetermined  Abnormal ECG  When compared with ECG of 25-JUN-2023 23:20,  No significant change was found       *Note: Due to a large number of results and/or encounters for the requested time period, some results have not been displayed. A complete set of results can be found in Results Review.       Imaging:  CT Chest PE Abdomen Pelvis w Contrast   Final Result   IMPRESSION:   No acute process in the chest, abdomen or pelvis.        Report per radiology    Laboratory:  Labs Ordered and Resulted from Time of ED Arrival to Time of ED Departure   COMPREHENSIVE METABOLIC PANEL - Abnormal       Result Value    Sodium 138      Potassium 3.9      Chloride 100      Carbon Dioxide (CO2) 26      Anion Gap 12      Urea Nitrogen 15.0      Creatinine 1.13 (*)     Calcium 9.1      Glucose 177 (*)     Alkaline Phosphatase 63      AST 13      ALT 31      Protein Total 6.3 (*)     Albumin 3.9      Bilirubin Total 0.2      GFR Estimate 55 (*)    COVID-19 VIRUS (CORONAVIRUS) BY PCR - Abnormal    SARS CoV2 PCR Positive (*)    CBC WITH  PLATELETS AND DIFFERENTIAL - Abnormal    WBC Count 8.9      RBC Count 2.94 (*)     Hemoglobin 9.8 (*)     Hematocrit 30.5 (*)      (*)     MCH 33.3 (*)     MCHC 32.1      RDW 12.8      Platelet Count 185      % Neutrophils 70      % Lymphocytes 21      % Monocytes 8      % Eosinophils 0      % Basophils 0      % Immature Granulocytes 1      NRBCs per 100 WBC 0      Absolute Neutrophils 6.1      Absolute Lymphocytes 1.9      Absolute Monocytes 0.7      Absolute Eosinophils 0.0      Absolute Basophils 0.0      Absolute Immature Granulocytes 0.1      Absolute NRBCs 0.0      Emergency Department Course & Assessments:  Interventions:  Medications   iopamidol (ISOVUE-370) solution 122 mL (122 mLs Intravenous $Given 8/17/23 0241)   Saline Flush (100 mLs Intravenous $Given 8/17/23 3324)   ketorolac (TORADOL) injection 15 mg (15 mg Intravenous $Given 8/17/23 8086)     Social Determinants of Health affecting care:  none     Disposition:  The patient was discharged to home.     Impression & Plan      Medical Decision Making:  Nena Tang is a 63-year-old female who presented to the emergency department with right flank pain.  Patient is tender over this area.  There is no signs of shingles on exam.  No evidence of infection.  No abdominal tenderness on exam.  CT chest abdomen pelvis shows no acute pathology on exam.  Basic labs are unremarkable.  She has no abdominal tenderness to suggest cholecystitis or biliary colic.  She did test positive for COVID today.  She has had over 5 days of symptoms per her report and no indication for Paxlovid.  She is not hypoxic and no indication for hospitalization.  No urinary symptoms to suggest urinary tract infection.  May be muscle spasms and muscle aches from COVID infection.  Discussed symptomatic treatment at home for pain.  No indication for hospitalization.    Diagnosis:    ICD-10-CM    1. Infection due to 2019 novel coronavirus  U07.1       2. Flank pain  R10.9           8/17/2023   Bertha Dean MD, MD  08/17/23 0704

## 2023-08-17 NOTE — DISCHARGE INSTRUCTIONS
Ibuprofen and tylenol as needed for pain  Unclear exactly when you developed covid as you had symptoms while you are away from [place]    Discharge Instructions  COVID-19    COVID-19 is the disease caused by a new coronavirus. The virus spreads from person-to-person primarily by droplets when an infected person coughs or sneezes and the droplets are then breathed in by another person.    Symptoms of COVID-19  Many people have no symptoms or mild symptoms.  Symptoms usually appear within a few days, but up to 14-days, after contact with a person with COVID-19.    A mild COVID-19 illness is like a cold and can have fever, cough, sneezing, sore throat, tiredness, headache, and muscle pain.    A moderate COVID-19 illness might include shortness of breath or pneumonia on a chest x-ray.    A severe COVID-19 illness causes significant breathing problems such as low oxygen levels or more serious pneumonia.  Some patients experience loss of taste or smell which is somewhat unique to COVID-19.      Isolation and Quarantine  Testing is recommended for any person with symptoms that could be COVID-19 and often for those exposed to COVID-19. The best way to stop the spread of the virus is to avoid contact with others.    A close contact exposure is being within 6 feet of someone with COVID for 15 minutes.    Isolation refers to sick people staying away from people who are not sick.    A person in quarantine is limiting activity because they were exposed and are waiting to see if they might become sick.    If you test positive for COVID and have no symptoms, you should stay home (isolation) for 5 full days after the day of the test. You should then wear a mask when around others for another 5 days.    If you test positive for COVID and have mild symptoms, you should stay home (isolation) for at least 5 days after your symptoms began. You can return to normal activities at that time, wearing a mask when around others, for another  5 days as long as your symptoms are improving/resolving and you have been without a fever for 24 hours (without using fever-reducing medicine).    If you test positive for COVID and have more than mild symptoms, you should stay home (isolation) for at least 10 days after your symptoms began. You can return to normal activities at that time as long as your symptoms are improving and you have been without a fever for 24 hours (without using fever-reducing medicine).  For example, if you have a fever and cough for 6 days, you need to stay home 4 more days with no fever for a total of 10 days. Or, if you have a fever and cough for 10 days, you need to stay home one more day with no fever for a total of 11 days.    If you were exposed to COVID and are not vaccinated (or it has been more than six months from your Pfizer or Moderna vaccine or two months from J&J vaccine), you should stay home (quarantine) for 5 days and then wear a mask around others for 5 additional days. A COVID test at day 5 is recommended.    If you were exposed to COVID and are vaccinated (had a booster, had two shots of Pfizer or Moderna vaccine in the last five months, or had J&J vaccine within two months), you do not need to quarantine but should wear a mask around others for 10 days and get a COVID test on day 5.    If you have symptoms but a negative test, you should stay at home until you have mild/improving symptoms and are without fever for 24 hours, using the same judgment you would for when it is safe to return to work/school from strep throat, influenza, or the common cold. If you worsen, you should consider being re-evaluated.    If you are being tested for COVID because of symptoms and your test is pending, you should stay home until you know your test result.  More details on isolation and quarantine can be found on this website from the CDC:  https://www.cdc.gov/coronavirus/2019-ncov/your-health/quarantine-isolation.html    If I have  COVID, how should I protect myself and others?    Do not go to work or school. Have a friend or relative do your shopping. Do not use public transportation (bus, train) or ridesharing (Lyft, Uber).    Separate yourself from other people in your home. As much as possible, you should stay in one room and away from other people in your home. Also, use a separate bathroom, if possible. Avoid handling pets or other animals while sick.     Wear a facemask if you need to be around other people and cover your mouth and nose with a tissue when you cough or sneeze.     Avoid sharing personal household items. You should not share dishes, drinking glasses, forks/knives/spoons, towels, or bedding with other people in your home. After using these items, they should be washed with soap and water. Clean parts of your home that are touched often (doorknobs, faucets, countertops, etc.) daily.     Wash your hands often with soap and water for at least 20 seconds or use an alcohol-based hand  containing at least 60% alcohol.     Avoid touching your face.    Treat your symptoms. You can take Acetaminophen (Tylenol) to treat body aches and fever as needed for comfort. Ibuprofen (Advil or Motrin) can be used as well if you still have symptoms after taking Tylenol. Drink fluids. Rest.    Watch for worsening symptoms such as shortness of breath/difficulty breathing or very severe weakness.    Employers/workplaces are being asked by the Centers for Disease Control (CDC) to not request notes/documentation for you to return to work or prove that you were ill. You may choose to show your employer this paperwork. Also, repeat testing should not be required to return to work.    Exercise/Sports in rare cases, COVID could affect your heart in a way that makes exercise or participation in sports dangerous.  If you have a mild COVID illness (fever, cough, sore throat, and similar symptoms but no difficulty breathing or abnormalities of the  lung): After your COVID symptoms have resolved, wait 14-days before returning to activity.  If you have more than a mild illness (meaning that you have problems with your breathing or lungs) or if you participate in competitive or strenuous activity or have a history of heart disease: Please see your primary doctor/provider prior to return to activity/competition.    COVID treatments such as antiviral and antibody medications are available. They are recommended for those patients who have a risk for developing more severe COVID illness. Importantly, the treatments must be started early in the illness (within 5-7 days, depending on which treatment). These treatments may have been considered today during your visit. If you have other questions, contact your primary doctor/clinic.     You can learn more about COVID treatments from the [facility]:  [email or web address]/coronavirus/meds.html    Return to the Emergency Department if:    If you are developing worsening breathing, shortness of breath, or feel worse you should seek medical attention.  If you are uncertain, contact your health care provider/clinic. If you need emergency medical attention, call 911 and tell them you have been ill.

## 2023-08-17 NOTE — ED NOTES
Bed: ED09  Expected date:   Expected time:   Means of arrival:   Comments:  HEMS [phone] for GH, right flank pain, eta 0020

## 2023-08-17 NOTE — ED NOTES
Attempted to call  staff multiple times without success.  Will continue to try.    Elyssa Fish RN,.......................................... 8/17/2023   5:49 AM

## 2023-09-11 ENCOUNTER — TELEPHONE (OUTPATIENT)
Dept: FAMILY MEDICINE | Facility: CLINIC | Age: 63
End: 2023-09-11
Payer: COMMERCIAL

## 2023-09-11 NOTE — TELEPHONE ENCOUNTER
Praveena calling to reschedule an appt for follow-up and medication adjustment   Scheduled 10/3/23    Cherri MURPHY RN  [facility]

## 2023-09-12 DIAGNOSIS — M79.10 MYALGIA: ICD-10-CM

## 2023-09-12 RX ORDER — PREDNISONE 5 MG/1
15 TABLET ORAL DAILY
Qty: 84 TABLET | Refills: 0 | Status: ON HOLD | OUTPATIENT
Start: 2023-09-12 | End: 2023-09-26

## 2023-09-15 NOTE — TELEPHONE ENCOUNTER
Glucose stable.  Hemoglobin A1c of 7.5.    Home Meds: Metformin 1g bid, Detemir 50 units, Aspart 10 units TIDWM, Trulicity weekly (?)  Hospital Meds:  Detemir 20 units, Aspart 4 units TIDWM moderate correction dose SSI  -Continue with current regimen - can restart Metformin on discharge  -Diabetic Diet  -Monitor and adjust as needed     Reordered as requested.  Kalie notified.    Indra Le RN

## 2023-09-18 ENCOUNTER — HOSPITAL ENCOUNTER (OUTPATIENT)
Facility: CLINIC | Age: 63
Setting detail: OBSERVATION
Discharge: STILL ADMITTED OR ADMINISTRATIVE | End: 2023-09-20
Attending: EMERGENCY MEDICINE | Admitting: NURSE PRACTITIONER
Payer: COMMERCIAL

## 2023-09-18 ENCOUNTER — MEDICAL CORRESPONDENCE (OUTPATIENT)
Dept: HEALTH INFORMATION MANAGEMENT | Facility: CLINIC | Age: 63
End: 2023-09-18
Payer: COMMERCIAL

## 2023-09-18 DIAGNOSIS — F43.10 PTSD (POST-TRAUMATIC STRESS DISORDER): ICD-10-CM

## 2023-09-18 DIAGNOSIS — R45.851 SUICIDAL IDEATION: ICD-10-CM

## 2023-09-18 DIAGNOSIS — F41.9 ANXIETY: ICD-10-CM

## 2023-09-18 DIAGNOSIS — F25.1 SCHIZOAFFECTIVE DISORDER, DEPRESSIVE TYPE (H): ICD-10-CM

## 2023-09-18 PROBLEM — F25.9 SCHIZOAFFECTIVE DISORDER, UNSPECIFIED TYPE (H): Status: ACTIVE | Noted: 2023-09-18

## 2023-09-18 LAB — GLUCOSE BLDC GLUCOMTR-MCNC: 164 MG/DL (ref 70–99)

## 2023-09-18 PROCEDURE — G0378 HOSPITAL OBSERVATION PER HR: HCPCS

## 2023-09-18 PROCEDURE — 250N000013 HC RX MED GY IP 250 OP 250 PS 637: Performed by: NURSE PRACTITIONER

## 2023-09-18 PROCEDURE — 90791 PSYCH DIAGNOSTIC EVALUATION: CPT

## 2023-09-18 PROCEDURE — 82962 GLUCOSE BLOOD TEST: CPT

## 2023-09-18 PROCEDURE — 250N000012 HC RX MED GY IP 250 OP 636 PS 637: Performed by: NURSE PRACTITIONER

## 2023-09-18 PROCEDURE — 99285 EMERGENCY DEPT VISIT HI MDM: CPT | Mod: 25

## 2023-09-18 RX ORDER — PRAZOSIN HYDROCHLORIDE 1 MG/1
1 CAPSULE ORAL AT BEDTIME
Status: DISCONTINUED | OUTPATIENT
Start: 2023-09-18 | End: 2023-09-19

## 2023-09-18 RX ORDER — QUETIAPINE FUMARATE 100 MG/1
100 TABLET, FILM COATED ORAL AT BEDTIME
Status: DISCONTINUED | OUTPATIENT
Start: 2023-09-18 | End: 2023-09-20 | Stop reason: HOSPADM

## 2023-09-18 RX ORDER — METOPROLOL SUCCINATE 50 MG/1
50 TABLET, EXTENDED RELEASE ORAL DAILY
Status: DISCONTINUED | OUTPATIENT
Start: 2023-09-19 | End: 2023-09-20 | Stop reason: HOSPADM

## 2023-09-18 RX ORDER — ESCITALOPRAM OXALATE 10 MG/1
10 TABLET ORAL DAILY
Status: DISCONTINUED | OUTPATIENT
Start: 2023-09-19 | End: 2023-09-20 | Stop reason: HOSPADM

## 2023-09-18 RX ORDER — PREDNISONE 5 MG/1
5 TABLET ORAL 3 TIMES DAILY
Status: DISCONTINUED | OUTPATIENT
Start: 2023-09-18 | End: 2023-09-20 | Stop reason: HOSPADM

## 2023-09-18 RX ORDER — AMANTADINE HYDROCHLORIDE 100 MG/1
100 CAPSULE, GELATIN COATED ORAL DAILY
Status: DISCONTINUED | OUTPATIENT
Start: 2023-09-19 | End: 2023-09-20 | Stop reason: HOSPADM

## 2023-09-18 RX ORDER — TRAZODONE HYDROCHLORIDE 100 MG/1
100 TABLET ORAL AT BEDTIME
Status: DISCONTINUED | OUTPATIENT
Start: 2023-09-18 | End: 2023-09-20 | Stop reason: HOSPADM

## 2023-09-18 RX ORDER — HYDROXYZINE PAMOATE 25 MG/1
25 CAPSULE ORAL EVERY 4 HOURS PRN
Status: DISCONTINUED | OUTPATIENT
Start: 2023-09-18 | End: 2023-09-20 | Stop reason: HOSPADM

## 2023-09-18 RX ORDER — MIRABEGRON 50 MG/1
50 TABLET, EXTENDED RELEASE ORAL DAILY
Status: DISCONTINUED | OUTPATIENT
Start: 2023-09-19 | End: 2023-09-20 | Stop reason: HOSPADM

## 2023-09-18 RX ORDER — PRAZOSIN HYDROCHLORIDE 2 MG/1
2 CAPSULE ORAL AT BEDTIME
Status: DISCONTINUED | OUTPATIENT
Start: 2023-09-18 | End: 2023-09-18

## 2023-09-18 RX ORDER — CLONAZEPAM 0.5 MG/1
0.5 TABLET ORAL
Status: DISCONTINUED | OUTPATIENT
Start: 2023-09-18 | End: 2023-09-20 | Stop reason: HOSPADM

## 2023-09-18 RX ORDER — ASPIRIN 81 MG/1
81 TABLET ORAL DAILY
Status: DISCONTINUED | OUTPATIENT
Start: 2023-09-19 | End: 2023-09-20 | Stop reason: HOSPADM

## 2023-09-18 RX ORDER — ACETAMINOPHEN 500 MG
1000 TABLET ORAL 2 TIMES DAILY
Status: DISCONTINUED | OUTPATIENT
Start: 2023-09-18 | End: 2023-09-20 | Stop reason: HOSPADM

## 2023-09-18 RX ORDER — NICOTINE POLACRILEX 4 MG
15-30 LOZENGE BUCCAL
Status: DISCONTINUED | OUTPATIENT
Start: 2023-09-18 | End: 2023-09-20 | Stop reason: HOSPADM

## 2023-09-18 RX ORDER — LOSARTAN POTASSIUM 50 MG/1
100 TABLET ORAL DAILY
Status: DISCONTINUED | OUTPATIENT
Start: 2023-09-19 | End: 2023-09-20 | Stop reason: HOSPADM

## 2023-09-18 RX ORDER — AMOXICILLIN 250 MG
1 CAPSULE ORAL AT BEDTIME
Status: DISCONTINUED | OUTPATIENT
Start: 2023-09-18 | End: 2023-09-20 | Stop reason: HOSPADM

## 2023-09-18 RX ORDER — MAGNESIUM 30 MG
30 TABLET ORAL AT BEDTIME
Status: DISCONTINUED | OUTPATIENT
Start: 2023-09-18 | End: 2023-09-18

## 2023-09-18 RX ORDER — DOCUSATE SODIUM 100 MG/1
100 CAPSULE, LIQUID FILLED ORAL 2 TIMES DAILY PRN
Status: DISCONTINUED | OUTPATIENT
Start: 2023-09-18 | End: 2023-09-20 | Stop reason: HOSPADM

## 2023-09-18 RX ORDER — PANTOPRAZOLE SODIUM 40 MG/1
40 TABLET, DELAYED RELEASE ORAL DAILY
Status: DISCONTINUED | OUTPATIENT
Start: 2023-09-19 | End: 2023-09-20 | Stop reason: HOSPADM

## 2023-09-18 RX ORDER — METOPROLOL SUCCINATE 50 MG/1
100 TABLET, EXTENDED RELEASE ORAL AT BEDTIME
Status: DISCONTINUED | OUTPATIENT
Start: 2023-09-18 | End: 2023-09-20 | Stop reason: HOSPADM

## 2023-09-18 RX ORDER — ATORVASTATIN CALCIUM 40 MG/1
80 TABLET, FILM COATED ORAL EVERY EVENING
Status: DISCONTINUED | OUTPATIENT
Start: 2023-09-18 | End: 2023-09-20 | Stop reason: HOSPADM

## 2023-09-18 RX ORDER — AMANTADINE HYDROCHLORIDE 100 MG/1
200 CAPSULE, GELATIN COATED ORAL EVERY EVENING
Status: DISCONTINUED | OUTPATIENT
Start: 2023-09-18 | End: 2023-09-20 | Stop reason: HOSPADM

## 2023-09-18 RX ORDER — DEXTROSE MONOHYDRATE 25 G/50ML
25-50 INJECTION, SOLUTION INTRAVENOUS
Status: DISCONTINUED | OUTPATIENT
Start: 2023-09-18 | End: 2023-09-20 | Stop reason: HOSPADM

## 2023-09-18 RX ORDER — MAGNESIUM AMINO ACID CHELATE 27 MG
250 TABLET ORAL AT BEDTIME
Status: DISCONTINUED | OUTPATIENT
Start: 2023-09-18 | End: 2023-09-20 | Stop reason: HOSPADM

## 2023-09-18 RX ORDER — ALENDRONATE SODIUM 70 MG/1
70 TABLET ORAL
Status: DISCONTINUED | OUTPATIENT
Start: 2023-09-20 | End: 2023-09-18

## 2023-09-18 RX ORDER — INSULIN LISPRO 100 [IU]/ML
8 INJECTION, SOLUTION INTRAVENOUS; SUBCUTANEOUS
Status: DISCONTINUED | OUTPATIENT
Start: 2023-09-19 | End: 2023-09-18

## 2023-09-18 RX ORDER — GABAPENTIN 300 MG/1
300 CAPSULE ORAL AT BEDTIME
Status: DISCONTINUED | OUTPATIENT
Start: 2023-09-18 | End: 2023-09-20 | Stop reason: HOSPADM

## 2023-09-18 RX ADMIN — ACETAMINOPHEN 1000 MG: 500 TABLET, FILM COATED ORAL at 22:04

## 2023-09-18 RX ADMIN — METOPROLOL SUCCINATE 100 MG: 50 TABLET, EXTENDED RELEASE ORAL at 22:02

## 2023-09-18 RX ADMIN — Medication 250 MG: at 23:15

## 2023-09-18 RX ADMIN — ATORVASTATIN CALCIUM 80 MG: 40 TABLET, FILM COATED ORAL at 22:03

## 2023-09-18 RX ADMIN — TRAZODONE HYDROCHLORIDE 100 MG: 100 TABLET ORAL at 22:04

## 2023-09-18 RX ADMIN — INSULIN GLARGINE 33 UNITS: 100 INJECTION, SOLUTION SUBCUTANEOUS at 23:04

## 2023-09-18 RX ADMIN — QUETIAPINE FUMARATE 100 MG: 100 TABLET ORAL at 22:03

## 2023-09-18 RX ADMIN — PRAZOSIN HYDROCHLORIDE 1 MG: 1 CAPSULE ORAL at 22:03

## 2023-09-18 RX ADMIN — GABAPENTIN 300 MG: 300 CAPSULE ORAL at 22:03

## 2023-09-18 RX ADMIN — AMANTADINE HYDROCHLORIDE 200 MG: 100 CAPSULE ORAL at 23:15

## 2023-09-18 RX ADMIN — PALIPERIDONE 9 MG: 6 TABLET, EXTENDED RELEASE ORAL at 23:14

## 2023-09-18 ASSESSMENT — ACTIVITIES OF DAILY LIVING (ADL)
ADLS_ACUITY_SCORE: 37

## 2023-09-18 NOTE — ED TRIAGE NOTES
Today started feeling SI, lives in group home, no attempts or plans     Triage Assessment       Row Name 09/18/23 4846       Triage Assessment (Adult)    Airway WDL WDL       Respiratory WDL    Respiratory WDL WDL       Cardiac WDL    Cardiac WDL WDL       Cognitive/Neuro/Behavioral WDL    Cognitive/Neuro/Behavioral WDL WDL

## 2023-09-18 NOTE — ED NOTES
[facility]  ED to EMPATH Checklist:      Goal for EMPATH: Suicidality    Current Behavior: Calm and Cooperative    Safety Concerns: Suicidal, no plan    Legal Hold Status: Voluntary    Medically Cleared by ED provider: Yes    Patient Therapeutically Searched: Not searched - Currently in triage    Belongings: Remain with patient    Independent Ambulation at Baseline: Yes/No: Yes    Participates in Care/Conversation: Yes/No: Yes    Patient Informed about EMPATH: Yes/No: Yes    DEC: Ordered and pending    Patient Ready to be Transferred to EMPATH? Yes/No: Yes

## 2023-09-18 NOTE — ED PROVIDER NOTES
History     Chief Complaint:  Suicidal ideation    HPI   Nena Tang is a 63 year old female with a history of schizoaffective disorder and who lives in a group home who came in for further evaluation of suicidal ideation.  She indicates that today is the anniversary of her mother's death, and she also lost some siblings and  within this same 2-month span in the past as well.  She was in her group, when the staff became concerned because she became quite tearful and upset.  She indicates that she was thinking about slitting her wrists, but she has not actually done anything to harm herself today.  She also has been taking her medications as prescribed and has not been using drugs or alcohol.  She came in voluntarily.  She had COVID about a month ago, but she has no ongoing symptoms from that.  She indicates that she has had visual and auditory hallucinations, although those are not new for her.      Independent Historian:   None - Patient Only    Review of External Notes:   None      Medications:    acetaminophen (TYLENOL) 500 MG tablet  Alcohol Swabs (EASY TOUCH ALCOHOL PREP MEDIUM) 70 % PADS  alendronate (FOSAMAX) 70 MG tablet  amantadine (SYMMETREL) 100 MG capsule  aspirin (ASPIRIN LOW DOSE) 81 MG EC tablet  atorvastatin (LIPITOR) 80 MG tablet  blood glucose (NO BRAND SPECIFIED) test strip  calcium carbonate (OS-ALLEN) 1500 (600 Ca) MG tablet  clonazePAM (KLONOPIN) 0.5 MG tablet  Continuous Blood Gluc Sensor (DEXCOM G6 SENSOR) MISC  Continuous Blood Gluc Transmit (DEXCOM G6 TRANSMITTER) MISC  diclofenac (VOLTAREN) 1 % topical gel  docusate sodium (COLACE) 100 MG capsule  escitalopram (LEXAPRO) 20 MG tablet  estradiol (ESTRACE VAGINAL) 0.1 MG/GM vaginal cream  gabapentin (NEURONTIN) 300 MG capsule  hydrOXYzine (VISTARIL) 25 MG capsule  insulin glargine (LANTUS PEN) 100 UNIT/ML pen  insulin lispro (HUMALOG KWIKPEN) 100 UNIT/ML (1 unit dial) KWIKPEN  losartan (COZAAR) 100 MG tablet  magnesium 250 MG  tablet  metoprolol succinate ER (TOPROL XL) 50 MG 24 hr tablet  mirabegron (MYRBETRIQ) 50 MG 24 hr tablet  MYRBETRIQ 25 MG 24 hr tablet  NOVOFINE AUTOCOVER PEN NEEDLE 30G X 8 MM miscellaneous  nystatin (MYCOSTATIN) 855819 UNIT/GM external powder  ondansetron (ZOFRAN) 4 MG tablet  OneTouch Delica Lancets 33G MISC  order for DME  order for DME  paliperidone ER (INVEGA) 9 MG 24 hr tablet  pantoprazole (PROTONIX) 40 MG EC tablet  prazosin (MINIPRESS) 1 MG capsule  predniSONE (DELTASONE) 5 MG tablet  QUEtiapine (SEROQUEL) 100 MG tablet  senna-docusate (SENOKOT-S/PERICOLACE) 8.6-50 MG tablet  thin (NO BRAND SPECIFIED) lancets  traZODone (DESYREL) 100 MG tablet  TRULICITY 3 MG/0.5ML SOPN  vitamin C (ASCORBIC ACID) 500 MG tablet  zinc oxide (DESITIN) 20 % external ointment        Past Medical History:    Past Medical History:   Diagnosis Date    Acute respiratory failure with hypoxia (H) 9/4/2017    CAD (coronary artery disease)     Chronic low back pain 1/22/2013    Cocaine abuse, in remission (H)     Fecal urgency 3/8/2012    History of heroin abuse (H)     Hyperlipidemia LDL goal <100 10/31/2010    Hypertension 7/29/2013    Illiterate 8/30/2011    Irritable bowel syndrome     Left cataract     Migraine 4/19/2012    Migraine headache 4/22/2013    Moderate major depression (H) 6/8/2011    Noncompliance with medication regimen 6/8/2011    Obesity     CINDY (obstructive sleep apnea) 3/8/2012    CINDY (obstructive sleep apnea)- mild AHI 10.3     Osteopenia 10/7/2009    Pneumonia of right lower lobe due to infectious organism 9/4/2017    Schizoaffective disorder, depressive type (H) 2/25/2013    Sepsis (H) 8/29/2017    Suicidal intent 10/2/2013    Takotsubo cardiomyopathy     Type 2 diabetes mellitus (H) 8/30/2011    Uterine cancer (H) 1983    Verbal auditory hallucination 10/4/2012       Past Surgical History:    Past Surgical History:   Procedure Laterality Date    CATARACT IOL, RT/LT Bilateral 2017    CHOLECYSTECTOMY       COLONOSCOPY N/A 3/16/2017    Procedure: COLONOSCOPY;  Surgeon: Traci Gonzalez MD;  Location:  GI    Coronary CTA  5/21/2014    HYSTERECTOMY  1983    uterine cancer yearly pap's per provider.    HYSTERECTOMY      LAPAROSCOPIC CHOLECYSTECTOMY  2008    PHACOEMULSIFICATION CLEAR CORNEA WITH STANDARD INTRAOCULAR LENS IMPLANT Left 5/5/2017    Procedure: PHACOEMULSIFICATION CLEAR CORNEA WITH STANDARD INTRAOCULAR LENS IMPLANT;  LEFT EYE PHACOEMULSIFICATION CLEAR CORNEA WITH STANDARD INTRAOCULAR LENS IMPLANT ;  Surgeon: Tyra Diaz MD;  Location:  EC    PHACOEMULSIFICATION CLEAR CORNEA WITH STANDARD INTRAOCULAR LENS IMPLANT Right 6/30/2017    Procedure: PHACOEMULSIFICATION CLEAR CORNEA WITH STANDARD INTRAOCULAR LENS IMPLANT;  RIGHT EYE PHACOEMULSIFICATION CLEAR CORNEA WITH STANDARD INTRAOCULAR LENS IMPLANT;  Surgeon: Tyra Diaz MD;  Location:  EC    RELEASE TRIGGER FINGER  10/11/2012    Left thumb. Procedure: RELEASE TRIGGER FINGER;  LEFT THUMB TRIGGER RELEASE;  Surgeon: Tay Langley MD;  Location:  SD    RELEASE TRIGGER FINGER Right 12/26/2016    Procedure: RELEASE TRIGGER FINGER;  Surgeon: Albino Castañeda MD;  Location:  OR    [facility] OOPHORECTORMY FOR MALALEJO, W/BX  1983    UTERINE        Physical Exam   Patient Vitals for the past 24 hrs:   BP Temp Temp src Pulse Resp SpO2 Weight   09/18/23 1746 (!) 184/61 98.2  F (36.8  C) Temporal -- -- -- --   09/18/23 1742 -- -- -- 87 20 95 % 109.8 kg (242 lb)        Physical Exam  Nursing note and vitals reviewed.  Constitutional:  Oriented to person, place, and time. Cooperative.   HENT:   Nose:    Nose normal.   Mouth/Throat:   Mucous membranes are normal.   Eyes:    Conjunctivae normal and EOM are normal.      Pupils are equal, round, and reactive to light.   Neck:    Trachea normal.   Cardiovascular:  Normal rate, regular rhythm, normal heart sounds and normal pulses. No murmur heard.  Pulmonary/Chest:  Effort normal and breath sounds  normal.   Abdominal:   Soft. Normal appearance and bowel sounds are normal.      There is no tenderness.      There is no rebound and no CVA tenderness.   Musculoskeletal:  Extremities atraumatic x 4.   Lymphadenopathy:  No cervical adenopathy.   Neurological:   Alert and oriented to person, place, and time. Normal strength.      No cranial nerve deficit or sensory deficit. GCS eye subscore is 4. GCS verbal subscore is 5. GCS motor subscore is 6.   Skin:    Skin is intact. No rash noted.   Psychiatric:   Tearful and depressed with ongoing suicidal ideation as well as visual and auditory hallucinations.      Emergency Department Course   Laboratory:  Labs Ordered and Resulted from Time of ED Arrival to Time of ED Departure - No data to display     Procedures       Emergency Department Course & Assessments:    PSS-3      Date and Time Over the past 2 weeks have you felt down, depressed, or hopeless? Over the past 2 weeks have you had thoughts of killing yourself? Have you ever attempted to kill yourself? When did this last happen? User   09/18/23 1743 yes yes yes more than 6 months ago [facility]          C-SSRS ([place])      Date and Time Q1 Wished to be Dead (Past Month) Q2 Suicidal Thoughts (Past Month) Q3 Suicidal Thought Method Q4 Suicidal Intent without Specific Plan Q5 Suicide Intent with Specific Plan Q6 Suicide Behavior (Lifetime) Within the Past 3 Months? RETIRED: Level of Risk per Screen Screening Not Complete User   09/18/23 1743 yes yes no no no yes -- -- -- [facility]                Suicide assessment completed by mental health (D.E.C., LCSW, etc.)    Interventions:  Medications - No data to display       Independent Interpretation (X-rays, CTs, rhythm strip):  None    Consultations/Discussion of Management or Tests:  DEC assessment       Social Determinants of Health affecting care:   None and Stress/Adjustment Disorders    Disposition:  The patient was transferred to [facility].     Impression & Plan    Medical  Decision Making:  This is a 63-year-old female who lives in a group home and has schizoaffective disorder who presented voluntarily for worsening depression and suicidal ideation.  This seems somewhat situational given the timing, and she has not done anything to harm herself.  She also has no other medical complaints at this time, and she has no ongoing COVID symptoms.  Therefore I feel that she is medically clear to be transferred to [facility] for further evaluation and management.      Diagnosis:    ICD-10-CM    1. Suicidal ideation  R45.851       2. Schizoaffective disorder, unspecified type (H)  F25.9                Scribe Disclosure:  I, Jennifer Medina, am serving as a scribe at 5:50 PM on 9/18/2023 to document services personally performed by Shaun Estevez MD based on my observations and the provider's statements to me.     9/18/2023   Shaun Estevez MD Lashkowitz, Seth H, MD  09/18/23 7446

## 2023-09-19 ENCOUNTER — TELEPHONE (OUTPATIENT)
Dept: BEHAVIORAL HEALTH | Facility: CLINIC | Age: 63
End: 2023-09-19
Payer: COMMERCIAL

## 2023-09-19 LAB
AMPHETAMINES UR QL SCN: NORMAL
BARBITURATES UR QL SCN: NORMAL
BENZODIAZ UR QL SCN: NORMAL
BZE UR QL SCN: NORMAL
CANNABINOIDS UR QL SCN: NORMAL
CHOLEST SERPL-MCNC: 155 MG/DL
FENTANYL UR QL: NORMAL
GLUCOSE BLDC GLUCOMTR-MCNC: 179 MG/DL (ref 70–99)
GLUCOSE BLDC GLUCOMTR-MCNC: 243 MG/DL (ref 70–99)
GLUCOSE BLDC GLUCOMTR-MCNC: 243 MG/DL (ref 70–99)
GLUCOSE SERPL-MCNC: 134 MG/DL (ref 70–99)
HDLC SERPL-MCNC: 56 MG/DL
LDLC SERPL CALC-MCNC: 68 MG/DL
NONHDLC SERPL-MCNC: 99 MG/DL
OPIATES UR QL SCN: NORMAL
PCP QUAL URINE (ROCHE): NORMAL
TRIGL SERPL-MCNC: 155 MG/DL

## 2023-09-19 PROCEDURE — 36415 COLL VENOUS BLD VENIPUNCTURE: CPT | Performed by: NURSE PRACTITIONER

## 2023-09-19 PROCEDURE — 82947 ASSAY GLUCOSE BLOOD QUANT: CPT | Performed by: PSYCHIATRY & NEUROLOGY

## 2023-09-19 PROCEDURE — 250N000012 HC RX MED GY IP 250 OP 636 PS 637: Performed by: NURSE PRACTITIONER

## 2023-09-19 PROCEDURE — G0378 HOSPITAL OBSERVATION PER HR: HCPCS

## 2023-09-19 PROCEDURE — 99233 SBSQ HOSP IP/OBS HIGH 50: CPT | Performed by: NURSE PRACTITIONER

## 2023-09-19 PROCEDURE — 250N000013 HC RX MED GY IP 250 OP 250 PS 637: Performed by: NURSE PRACTITIONER

## 2023-09-19 PROCEDURE — 80307 DRUG TEST PRSMV CHEM ANLYZR: CPT | Performed by: NURSE PRACTITIONER

## 2023-09-19 PROCEDURE — 80061 LIPID PANEL: CPT | Performed by: NURSE PRACTITIONER

## 2023-09-19 PROCEDURE — 82962 GLUCOSE BLOOD TEST: CPT

## 2023-09-19 RX ORDER — PALIPERIDONE 6 MG/1
12 TABLET, EXTENDED RELEASE ORAL AT BEDTIME
Status: DISCONTINUED | OUTPATIENT
Start: 2023-09-19 | End: 2023-09-20 | Stop reason: HOSPADM

## 2023-09-19 RX ORDER — CYPROHEPTADINE HYDROCHLORIDE 4 MG/1
4 TABLET ORAL AT BEDTIME
Status: DISCONTINUED | OUTPATIENT
Start: 2023-09-19 | End: 2023-09-20 | Stop reason: HOSPADM

## 2023-09-19 RX ADMIN — ACETAMINOPHEN 1000 MG: 500 TABLET, FILM COATED ORAL at 08:19

## 2023-09-19 RX ADMIN — PREDNISONE 5 MG: 5 TABLET ORAL at 21:08

## 2023-09-19 RX ADMIN — PREDNISONE 5 MG: 5 TABLET ORAL at 14:45

## 2023-09-19 RX ADMIN — ESCITALOPRAM OXALATE 10 MG: 10 TABLET ORAL at 08:19

## 2023-09-19 RX ADMIN — METOPROLOL SUCCINATE 100 MG: 50 TABLET, EXTENDED RELEASE ORAL at 21:08

## 2023-09-19 RX ADMIN — AMANTADINE HYDROCHLORIDE 200 MG: 100 CAPSULE ORAL at 21:07

## 2023-09-19 RX ADMIN — METOPROLOL SUCCINATE 50 MG: 50 TABLET, EXTENDED RELEASE ORAL at 08:24

## 2023-09-19 RX ADMIN — Medication 600 MG: at 08:24

## 2023-09-19 RX ADMIN — INSULIN ASPART 1 UNITS: 100 INJECTION, SOLUTION INTRAVENOUS; SUBCUTANEOUS at 21:11

## 2023-09-19 RX ADMIN — Medication 250 MG: at 21:08

## 2023-09-19 RX ADMIN — QUETIAPINE FUMARATE 100 MG: 100 TABLET ORAL at 21:09

## 2023-09-19 RX ADMIN — PANTOPRAZOLE SODIUM 40 MG: 40 TABLET, DELAYED RELEASE ORAL at 08:22

## 2023-09-19 RX ADMIN — INSULIN GLARGINE 33 UNITS: 100 INJECTION, SOLUTION SUBCUTANEOUS at 21:10

## 2023-09-19 RX ADMIN — CYPROHEPTADINE HYDROCHLORIDE 4 MG: 4 TABLET ORAL at 22:57

## 2023-09-19 RX ADMIN — MIRABEGRON 50 MG: 50 TABLET, FILM COATED, EXTENDED RELEASE ORAL at 08:23

## 2023-09-19 RX ADMIN — INSULIN GLARGINE 33 UNITS: 100 INJECTION, SOLUTION SUBCUTANEOUS at 08:50

## 2023-09-19 RX ADMIN — AMANTADINE HYDROCHLORIDE 100 MG: 100 CAPSULE ORAL at 08:23

## 2023-09-19 RX ADMIN — PREDNISONE 5 MG: 5 TABLET ORAL at 08:23

## 2023-09-19 RX ADMIN — TRAZODONE HYDROCHLORIDE 100 MG: 100 TABLET ORAL at 21:09

## 2023-09-19 RX ADMIN — LOSARTAN POTASSIUM 100 MG: 50 TABLET, FILM COATED ORAL at 08:19

## 2023-09-19 RX ADMIN — ACETAMINOPHEN 1000 MG: 500 TABLET, FILM COATED ORAL at 21:07

## 2023-09-19 RX ADMIN — GABAPENTIN 300 MG: 300 CAPSULE ORAL at 21:09

## 2023-09-19 RX ADMIN — ATORVASTATIN CALCIUM 80 MG: 40 TABLET, FILM COATED ORAL at 21:06

## 2023-09-19 RX ADMIN — ASPIRIN 81 MG: 81 TABLET, COATED ORAL at 08:57

## 2023-09-19 RX ADMIN — PALIPERIDONE 12 MG: 6 TABLET, EXTENDED RELEASE ORAL at 21:07

## 2023-09-19 ASSESSMENT — ACTIVITIES OF DAILY LIVING (ADL)
ADLS_ACUITY_SCORE: 37

## 2023-09-19 NOTE — PLAN OF CARE
Nena Tang  September 19, 2023  Plan of Care Hand-off Note     Patient Care Path: observation    Plan for Care:   Due to pt experiencing intensive SI with thoughts of method and intensive sxs of depression, her level of need exceeds that which her outpt and group home providers can provide and without EmPATH level of care, pt is likely to experience continued intensive sxs with risk of harm. Along with SI and thoughts of method, pt is experiencing nightly nightmares, hopelessness, helplessness, intense sadness with episodes of crying multiple times a day. Pt reports she is ruminating on thoughts of suicide and needs more intensive support to help her through this crisis.    Identified Goals and Safety Issues: Pt is in need of outpt therapy and medication managment. Pt has [facility] case managment, Carmella Fermin, [phone]. Per collateral attempts have been made to get therapy and psychiatry, and waiting lists are long. Pt may need EmPATH assistance with getting these providers in place    Overview:  DAVIN Neves with pt group home, [phone] (this is also the main number for [facility]). Scot Vogt, pt oldest son, [phone]          Legal Status: Legal Status at Admission: Voluntary/Patient has signed consent for treatment    Psychiatry Consult: Zoila, pt met with attending on EmPATH       Updated   regarding plan of care.  Attending DAVIN Christensen

## 2023-09-19 NOTE — ED NOTES
Patient presents with sad depressed affect. States she is so depressed since it is the Anniversary of her mothers death. She has chronic voices that tell her to hurt herself.  She had assist of 2 to get her up to the bathroom as she was dizzy. Gave her full assist in the bathroom changing and wiping her. Orthostatic blood pressures done Sitting 131/66 pulse 72  Standing 99/67 pulse 72. MD aware of orthostatic BP

## 2023-09-19 NOTE — ED PROVIDER NOTES
[facility] - Psychiatric Consultation  [facility] Emergency Department    Nena Tang MRN: [id number]   Age: 63 year old YOB: 1960     History     Chief Complaint   Patient presents with    Mental Health Problem     HPI  Nena Tang is a 63 year old female with a past history notable for schizoaffective disorder, depressive type, PTSD, CINDY, type II diabetes mellitus, COVID infection 1 month ago, among others. Patient presented to the emergency department for evaluation of suicidal ideation coinciding with the anniversary of her mother's passing. Patient was medically evaluated in the emergency department and determined to be medically stable for transfer to [facility] for further psychiatric assessment.     Patient seen for follow-up today. She has been experiencing dizziness and orthostatic hypotension today after restarting prazosin last night for PTSD-related nightmares. Patient seen in her recliner chair this afternoon, alert. She reports ongoing dizziness upon standing but is able to ambulate slowly without issue. She endorses ongoing symptoms of depression with low energy, low motivation, hopelessness. Sleep is poor with troubles falling and staying asleep. Denies nightmares occurring last night. She endorses a low appetite. She continues to endorse passive suicidal thinking without a plan to harm herself here, but feels she may attempt to cut herself if she were to return to her group home. She endorses ongoing auditory hallucinations commanding her to harm herself. She denies visual hallucinations. Denies paranoid ideation. Denies homicidal thinking. She does not feel safe to return to her group home at this time and is agreeable to pursue inpatient psychiatric hospitalization. She remains somewhat tearful throughout interview.     Past Medical History  Past Medical History:   Diagnosis Date    Acute respiratory failure with hypoxia (H) 9/4/2017    CAD (coronary artery disease)      5/2014 cath, nonbostructive stenosis to LAD, RCA.    Chronic low back pain 1/22/2013    Cocaine abuse, in remission (H)     Fecal urgency 3/8/2012    History of heroin abuse (H)     Hyperlipidemia LDL goal <100 10/31/2010    Hypertension 7/29/2013    Illiterate 8/30/2011    Irritable bowel syndrome     Left cataract     Migraine 4/19/2012    Migraine headache 4/22/2013    Moderate major depression (H) 6/8/2011    Noncompliance with medication regimen 6/8/2011    Obesity     CINDY (obstructive sleep apnea) 3/8/2012    uses CPAP    CINDY (obstructive sleep apnea)- mild AHI 10.3     Osteopenia 10/7/2009    Pneumonia of right lower lobe due to infectious organism 9/4/2017    Schizoaffective disorder, depressive type (H) 2/25/2013    Sepsis (H) 8/29/2017    Suicidal intent 10/2/2013    Takotsubo cardiomyopathy     Type 2 diabetes mellitus (H) 8/30/2011    Uterine cancer (H) 1983    Verbal auditory hallucination 10/4/2012     Past Surgical History:   Procedure Laterality Date    CATARACT IOL, RT/LT Bilateral 2017    CHOLECYSTECTOMY      COLONOSCOPY N/A 3/16/2017    Procedure: COLONOSCOPY;  Surgeon: Traci Gonzalez MD;  Location: [facility]    Coronary CTA  5/21/2014    HYSTERECTOMY  1983    uterine cancer yearly pap's per provider.    HYSTERECTOMY      LAPAROSCOPIC CHOLECYSTECTOMY  2008    PHACOEMULSIFICATION CLEAR CORNEA WITH STANDARD INTRAOCULAR LENS IMPLANT Left 5/5/2017    Procedure: PHACOEMULSIFICATION CLEAR CORNEA WITH STANDARD INTRAOCULAR LENS IMPLANT;  LEFT EYE PHACOEMULSIFICATION CLEAR CORNEA WITH STANDARD INTRAOCULAR LENS IMPLANT ;  Surgeon: Tyra Diaz MD;  Location:  [facility]    PHACOEMULSIFICATION CLEAR CORNEA WITH STANDARD INTRAOCULAR LENS IMPLANT Right 6/30/2017    Procedure: PHACOEMULSIFICATION CLEAR CORNEA WITH STANDARD INTRAOCULAR LENS IMPLANT;  RIGHT EYE PHACOEMULSIFICATION CLEAR CORNEA WITH STANDARD INTRAOCULAR LENS IMPLANT;  Surgeon: Tyra Diaz MD;  Location:  [facility]    RELEASE TRIGGER  FINGER  10/11/2012    Left thumb. Procedure: RELEASE TRIGGER FINGER;  LEFT THUMB TRIGGER RELEASE;  Surgeon: Tay Langley MD;  Location:  [facility]    RELEASE TRIGGER FINGER Right 12/26/2016    Procedure: RELEASE TRIGGER FINGER;  Surgeon: Albino Castañeda MD;  Location:  OR    [facility] OOPHORECTORMY FOR RAHEL, W/BX  1983    UTERINE     acetaminophen (TYLENOL) 500 MG tablet  Alcohol Swabs (EASY TOUCH ALCOHOL PREP MEDIUM) 70 % PADS  amantadine (SYMMETREL) 100 MG capsule  aspirin (ASPIRIN LOW DOSE) 81 MG EC tablet  atorvastatin (LIPITOR) 80 MG tablet  calcium carbonate (OS-ALLEN) 1500 (600 Ca) MG tablet  clonazePAM (KLONOPIN) 0.5 MG tablet  Continuous Blood Gluc Sensor (DEXCOM G6 SENSOR) MISC  Continuous Blood Gluc Transmit (DEXCOM G6 TRANSMITTER) MISC  diclofenac (VOLTAREN) 1 % topical gel  docusate sodium (COLACE) 100 MG capsule  escitalopram (LEXAPRO) 20 MG tablet  estradiol (ESTRACE VAGINAL) 0.1 MG/GM vaginal cream  gabapentin (NEURONTIN) 300 MG capsule  hydrOXYzine (VISTARIL) 25 MG capsule  insulin glargine (LANTUS PEN) 100 UNIT/ML pen  insulin lispro (HUMALOG KWIKPEN) 100 UNIT/ML (1 unit dial) KWIKPEN  losartan (COZAAR) 100 MG tablet  magnesium 250 MG tablet  metoprolol succinate ER (TOPROL XL) 50 MG 24 hr tablet  mirabegron (MYRBETRIQ) 50 MG 24 hr tablet  MYRBETRIQ 25 MG 24 hr tablet  NOVOFINE AUTOCOVER PEN NEEDLE 30G X 8 MM miscellaneous  nystatin (MYCOSTATIN) 897829 UNIT/GM external powder  paliperidone ER (INVEGA) 9 MG 24 hr tablet  pantoprazole (PROTONIX) 40 MG EC tablet  prazosin (MINIPRESS) 1 MG capsule  predniSONE (DELTASONE) 5 MG tablet  QUEtiapine (SEROQUEL) 100 MG tablet  senna-docusate (SENOKOT-S/PERICOLACE) 8.6-50 MG tablet  traZODone (DESYREL) 100 MG tablet  vitamin C (ASCORBIC ACID) 500 MG tablet  zinc oxide (DESITIN) 20 % external ointment  alendronate (FOSAMAX) 70 MG tablet  blood glucose (NO BRAND SPECIFIED) test strip  ondansetron (ZOFRAN) 4 MG tablet  OneTouch Delica Lancets 33G MISC  order for  DME  order for DME  thin (NO BRAND SPECIFIED) lancets  TRULICITY 3 MG/0.5ML SOPN      Allergies   Allergen Reactions    Imidazole Antifungals Hives     Tolerates diflucan    Ketoprofen Itching     Pruritis to topical    Lisinopril Hives    Metformin Other (See Comments)     Patient hospitalized for lactic acidosis - admitting provider suspectd caused by metformin    Metronidazole Hives    Posaconazole Hives     Tolerates diflucan     Family History  Family History   Problem Relation Age of Onset    Cancer Mother         BLADDER    Respiratory Mother         COPD    Gastrointestinal Disease Mother         CIRRHOSIS OF LI BOLIVAR    Alcohol/Drug Mother     Diabetes Mother     Hypertension Mother     Lipids Mother     C.A.D. Mother     Glaucoma Mother     Alcohol/Drug Sister     Mental Illness Sister     Alcohol/Drug Sister     Psychotic Disorder Sister     Cancer Maternal Grandmother         UNKNOWN TYPE    Cancer Brother         COLON    Cancer - colorectal Brother         IN HIS LATE 30S    Alcohol/Drug Brother          OF HEROIN OVERDOSE AT AGE 22 YRS    Macular Degeneration No family hx of      Social History   Social History     Tobacco Use    Smoking status: Never    Smokeless tobacco: Never   Vaping Use    Vaping Use: Never used   Substance Use Topics    Alcohol use: Not Currently     Comment: when younger    Drug use: No     Comment: history of      Past medical history, past surgical history, medications, allergies, family history, and social history were reviewed with the patient. No additional pertinent items.       Review of Systems  A medically appropriate review of systems was performed with pertinent positives and negatives noted in the HPI, and all other systems negative.    Physical Examination   BP: (!) 184/61  Pulse: 87  Temp: 98.2  F (36.8  C)  Resp: 20  Height: 152.4 cm (5')  Weight: 109.8 kg (242 lb)  SpO2: 95 %  Sitting Orthostatic BP: 131/66  Sitting Orthostatic Pulse: 72 bpm  Standing  Orthostatic BP: 99/67  Standing Orthostatic Pulse: 72 bpm    Physical Exam  General: Appears stated age.   Neuro: Alert and fully oriented. Extremities appear to demonstrate normal strength on visual inspection.   Integumentary/Skin: no rash visualized, normal color    Psychiatric Examination   Appearance: awake, alert, appeared as age stated, casually dressed, and slightly unkempt  Attitude:  cooperative  Eye Contact:  fair  Mood:  depressed  Affect:  mood congruent and intensity is blunted  Speech:  clear, coherent  Psychomotor Behavior: possible orofacial dyskinesia; did not observe tics, tremors, or dystonia  Thought Process:  linear  Associations:  no loose associations  Thought Content:   endorses ongoing suicidal ideation with thoughts of cutting wrists to end life. Denies homicidal thinking. Endorses command auditory hallucinations telling her to harm herself. Denies visual hallucinations. Denies paranoia.    Insight:  fair  Judgement:  fair  Oriented to:  time, person, and place  Attention Span and Concentration:  fair  Recent and Remote Memory:  fair  Language: able to name/identify objects without impairment  Fund of Knowledge: intact with awareness of current and past events    ED Course        Labs Ordered and Resulted from Time of ED Arrival to Time of ED Departure   GLUCOSE BY METER - Abnormal       Result Value    GLUCOSE BY METER POCT 164 (*)    GLUCOSE - Abnormal    Glucose 134 (*)    GLUCOSE BY METER - Abnormal    GLUCOSE BY METER POCT 179 (*)    GLUCOSE MONITOR NURSING POCT   GLUCOSE MONITOR NURSING POCT   GLUCOSE MONITOR NURSING POCT   GLUCOSE MONITOR NURSING POCT   GLUCOSE MONITOR NURSING POCT   GLUCOSE MONITOR NURSING POCT   GLUCOSE MONITOR NURSING POCT   GLUCOSE MONITOR NURSING POCT   GLUCOSE MONITOR NURSING POCT   GLUCOSE MONITOR NURSING POCT   GLUCOSE MONITOR NURSING POCT   GLUCOSE MONITOR NURSING POCT   LIPID REFLEX TO DIRECT LDL PANEL       Assessments & Plan (with Medical Decision  Making)   Patient presenting with suicidal ideation with a plan to cut her wrists in the context of auditory hallucinations and worsening depressive symptoms surrounding the anniversary of her mother's passing. Nursing notes reviewed noting no acute issues.     I have reviewed the assessment completed by the [facility].     Preliminary diagnosis:    ICD-10-CM    1. Suicidal ideation  R45.851       2. Schizoaffective disorder, depressive type (H)  F25.1       3. PTSD (post-traumatic stress disorder)  F43.10       4. Anxiety  F41.9            Treatment Plan:  - Will refer patient for inpatient psychiatric hospitalization for further stabilization  - Increase paliperidone from 9 mg to 12 mg daily to further target command auditory hallucinations  - Stop prazosin due to orthostatic hypotension throughout the day today. Trial cyproheptadine 4 mg at bedtime to target PTSD-related nightmares  - Continue remainder of medications unchanged  - Patient to remain on observation status while awaiting an inpatient bed    --  Ryan Fan CNP   [facility] EMERGENCY DEPT  EmPATH Unit      Ryan Fan CNP  09/19/23 [id number]

## 2023-09-19 NOTE — TELEPHONE ENCOUNTER
S: IRENE Silva  Carl  calling at 5:00PM about a 63 year old/Female presenting with SI with a plan     B: Pt arrived via Group Home Staff . Presenting problem, stressors: Increase depression, worsening SI.  Pt reports plan to cut her wrists.  Pt also reports AH of a voice telling her to overdose on pills.  Stressors include recent anniversary of family members passing.      Pt affect in ED: Flat, sad, hopeless  Pt Dx: Schizoaffective Disorder  Previous [facility] hx? Yes: [facility]   Pt endorses SI with a plan to cut her wrists    Hx of suicide attempt? Yes: 5 previous attempts.  Most recent attempt 2021.  Pt denies SIB  Pt denies HI   Pt endorses auditory hallucinations .  Pt reports a voice telling her to overdose on pills  Pt RARS Score: 5    Hx of aggression/violence, sexual offenses, legal concerns, Epic care plan? describe: No  Current concerns for aggression this visit? No  Does pt have a history of Civil Commitment? No  Is Pt their own guardian? Yes    Pt is prescribed medication. Is patient medication compliant? Yes  Pt OP Services include [facility]   CD concerns: None  Acute or chronic medical concerns: Diabetes.  Pt is medically cleared for placement  Does Pt present with specific needs, assistive devices, or exclusionary criteria? None      Pt is ambulatory  Pt is able to perform ADLs independently      A: Pt to be reviewed for [facility] admission. Pt is Voluntary  Preferred placement: [facility]    COVID Symptoms: No  If yes, COVID test required   Utox: Not ordered, intake to request lab    CMP: N/A  CBC: N/A  HCG: N/A    R: Patient cleared and ready for behavioral bed placement: Yes  Pt placed on [facility] worklist? Yes    Does Patient need a Transfer Center request created? Yes, writer completed Transfer Center request at:

## 2023-09-19 NOTE — PROGRESS NOTES
[facility] Progress Note    Client Name: Nena Tang  Date: September 18, 2023  Service Type:  Group Therapy  Session Start Time:  7:00 PM                       Session End Time:    7:30 PM  Session Length: 30 minutes  Attendees: Patient and other group members  Facilitator: BIN Burton     Topic:   Progressive muscle relaxation     Intervention:    Group process: support, challenge, affirm, psycho-education.     Response:  Patient did not participate in group. Pt was meeting with [facility].       BIN Burton

## 2023-09-19 NOTE — PROGRESS NOTES
[facility] Progress Note    Client Name: Nena Tang  Date: September 19, 2023  Service Type:  Group Therapy  Facilitator:CHALINO Walter      Response:  Patient did not participate in group.      CHALINO Walter

## 2023-09-19 NOTE — ED NOTES
Pt got up by herself to go to the phone was slightly unsteady but said she was not dizzy. Will continue to monitor orthostatics.

## 2023-09-19 NOTE — CONSULTS
"Diagnostic Evaluation Consultation  Crisis Assessment    Patient Name: Nena Tang  Age:  63 year old  Legal Sex: female  Gender Identity: female  Pronouns: she, her  Race: Choose not to Answer  Ethnicity: Choose not to answer  Language: English      Patient was assessed: In person      Patient location: [facility] EMERGENCY DEPT                             [id number]    Referral Data and Chief Complaint  Nena Tang presents to the ED per community partner(s). Patient is presenting to the ED for the following concerns: Suicidal ideation, Depression, Other (see comment) (grief related to anniversary of multiple family members, most notably pt reports grief related to death of her mother.).   Factors that make the mental health crisis life threatening or complex are:  Pt presents to ED with increasing SI with thoughts of cutting her wrists. Pt reports that her mother  in a September and that her , 2 brothers and 2 sisters also  during the months of Sept and Oct. She reports that her mother  in her arms and the grief can become overwhelming. Pt lives in a group home with chronic SPMI dx and lower cognitive abilities and per collateral report from group home staff (see collateral for more information), pt has gried episodes often and staff help her to manage and cope, but this year the sxs are more intensive and higher level of care was needed. Pt reports chronic auditory hallucinations and as recent as 2 days ago, experienced A/H with a voice telling her to overdose on pills. Pt reports experiencing nightmares during periods of grief, and these nightmares are daily now, with dreams of \"killing a shadow person\" and of her dying. Pt is in a group home with supportive staff, but has no current therapist or psychiatrist. Pt has CADI waiver in place and per collateral, staff are attempting to establish outpt medication management with barriers of waiting lists. Writer informed " collateral and pt that [facility] can assist her with connecting with outpt providers, with coordination of CADI worker..      Informed Consent and Assessment Methods  Explained the crisis assessment process, including applicable information disclosures and limits to confidentiality, assessed understanding of the process, and obtained consent to proceed with the assessment.  Assessment methods included conducting a formal interview with patient, review of medical records, collaboration with medical staff, and obtaining relevant collateral information from family and community providers when available.  : done     Patient response to interventions: eager to participate  Coping skills were attempted to reduce the crisis:  Pt reports that she reached out to day program staff and group home staff for supports, and also called her oldest son for support and suggestions for coping. She reports all supports encouraged pt to go to ED for evaluation and obtain a higher level of care.     History of the Crisis   Pt has chronic struggles with SI and 5 prior attempts of suicide, the last being in 2021. Pt reports that she has been treated inpt multiple times, the last was following the suicide attempt in 2021, which she attempted by means of ingesting excessive insulin. Per collateral report, pt has lower level of executive functioning with limited cognitive abilities. Pt has lived in same group home past 2 years and reports this is supportive and helpful, and that this time of year is typically overwhelming for her. Pt reports she is her own guardian. Pt reports no current therapist or psychiatrist and has been receiving medication management via her PCP and that she has not worked with an outpt therapist in over 6 months. Pt reports interest in getting connected with both.    Brief Psychosocial History  Family:  , Children yes (2 sons, both in [facility], both considered supports by pt)  Support System:  Children,  Facility resident(s)/Staff  Employment Status:  disabled  Source of Income:  disability  Financial Environmental Concerns:  No concerns identified  Current Hobbies:  television/movies/videos, group/social activities  Barriers in Personal Life:  cognitive limitations, mental health concerns, medical conditions/precautions    Significant Clinical History  Current Anxiety Symptoms:     Current Depression/Trauma:  sadness, crying or feels like crying, hoplessness, helplessness, thoughts of death/suicide (Pt reports poor sleep and nightly nightmares)  Current Somatic Symptoms:     Current Psychosis/Thought Disturbance:  auditory hallucinations  Current Eating Symptoms:     Chemical Use History:  Alcohol: None  Benzodiazepines: None  Opiates: None  Cocaine: None  Marijuana: None  Other Use: None   Past diagnosis:  Anxiety Disorder, Bipolar Disorder, Depression, Schizophrenia, Substance Use Disorder, Suicide attempt(s), PTSD  Family history:  No known history of mental health or chemical health concerns  Past treatment:  Individual therapy, Case management, Psychiatric Medication Management, Primary Care, Inpatient Hospitalization, Supportive Living Environment (group home, jail house, etc), Other (Adult Day Care)  Details of most recent treatment:  Pt presents has CADI waiver with CADI , pt lives in [facility] and receives group home staff support, and pt has medication management via PCP.  Other relevant history:  Pt reports hx of trauma. Given pt lower level of executive functioning and fact that she is overwhelmed with grief with limited means of coping at this time, writer did not inquire about details of trauma during this interview, with more focus on regulating and grounding.       Collateral Information  Is there collateral information: Yes     Collateral information name, relationship, phone number:  DAVIN Nelson from pt group home at [facility], [phone]    What happened today: Pt was  "in a group at the adult day care facility she attents, and she disclosed intensive grief related to anniversary of death of her mother. She reports pt disclosed increasing SI with current thoughts of plan to cut on her wrists. She reports that pt experiences \"episodes of grief\" often, and group home staff are typically able to help her cope and manage related sxs. However, this year the grief seems more intense, and SI more intense with pt ruminating on thoughts of suicide.     What is different about patient's functioning: More depressed than usual with more intensive level of SI and ruminating on suicide. More frequent nightmares. Less control of her diabetes with blood sugar spikes up to 400.     Concern about alcohol/drug use:  no    What do you think the patient needs:  assist with stabilization and to establish outpt therapy and psychiatry    Has patient made comments about wanting to kill themselves/others: yes (Pt ruminating on thoughts of suicide with method to cut her wrists.)    If d/c is recommended, can they take part in safety/aftercare planning:  yes (Pt will be returning back to group home when she is stable enough to do so.)    Additional collateral information:  Edinson reports that pt PCP has been handling medication management and pt has no other medication provider. She reports pt  has been inquiring with [facility] and other providers, and all have lengthy waiting list. She reports hope that [facility] can assist her with establishing longer-term outpt psychiatry.     Risk Assessment  [place] Suicide Severity Rating Scale Full Clinical Version:  Suicidal Ideation  Q1 Wish to be Dead (Lifetime): Yes  Q2 Non-Specific Active Suicidal Thoughts (Lifetime): Yes  3. Active Suicidal Ideation with any Methods (Not Plan) Without Intent to Act (Lifetime): Yes  Q4 Active Suicidal Ideation with Some Intent to Act, Without Specific Plan (Lifetime): Yes  Q5 Active Suicidal Ideation with " Specific Plan and Intent (Lifetime): Yes  Q6 Suicide Behavior (Lifetime): yes (2 years ago pt attempted suicide via ingesting excessive insulin)     Suicidal Behavior (Lifetime)  Actual Attempt (Lifetime): Yes  Total Number of Actual Attempts (Lifetime): 5  Actual Attempt Description (Lifetime): Pt reports hx of attempting overdose by means of ingesting excessive insulin and medications  Has subject engaged in non-suicidal self-injurious behavior? (Lifetime): No  Interrupted Attempts (Lifetime): No  Aborted or Self-Interrupted Attempt (Lifetime): No  Preparatory Acts or Behavior (Lifetime): No    [place] Suicide Severity Rating Scale Recent:   Suicidal Ideation (Recent)  Q1 Wished to be Dead (Past Month): yes  Q2 Suicidal Thoughts (Past Month): yes  Q3 Suicidal Thought Method: yes (cut on wrists - thought with no plan or intent)  Q4 Suicidal Intent without Specific Plan: no  Q5 Suicide Intent with Specific Plan: no  Within the Past 3 Months?: no  Level of Risk per Screen: moderate risk  Intensity of Ideation (Recent)  Most Severe Ideation Rating (Past 1 Month): 3  Description of Most Severe Ideation (Past 1 Month): Reports wave of depression, common for this time of year - anniversary of mother's death and her  and siblings. Reports thought of cutting wrists. Reports no intention or plan and wants to stay alive for her grandchildren, so she came to [facility] today to help her through this wave of grief.  Frequency (Past 1 Month): Many times each day  Duration (Past 1 Month): 4-8 hours/most of day  Controllability (Past 1 Month): Can control thoughts with some difficulty  Deterrents (Past 1 Month): Deterrents definitely stopped you from attempting suicide  Reasons for Ideation (Past 1 Month): Completely to end or stop the pain (You couldn't go on living with the pain or how you were feeling)  Suicidal Behavior (Recent)  Actual Attempt (Past 3 Months): No  Has subject engaged in non-suicidal self-injurious  "behavior? (Past 3 Months): No  Interrupted Attempts (Past 3 Months): No  Aborted or Self-Interrupted Attempt (Past 3 Months): No  Preparatory Acts or Behavior (Past 3 Months): No    Environmental or Psychosocial Events: challenging interpersonal relationships, helplessness/hopelessness, anniversary of traumatizing events (per collateral report, pt has friend named Shell who has attempted to convince pt to leave the group home setting, and group home staff have filed an adult protection report.)  Protective Factors: Protective Factors: strong bond to family unit, community support, or employment, lives in a responsibly safe and stable environment, good treatment engagement, sense of importance of health and wellness, able to access care without barriers, help seeking, supportive ongoing medical and mental health care relationships, sense of belonging, cultural, spiritual , or Orthodoxy beliefs associated with meaning and value in life    Does the patient have thoughts of harming others? Feels Like Hurting Others: no  Previous Attempt to Hurt Others: no    Is the patient engaging in sexually inappropriate behavior?           Mental Status Exam   Affect: Blunted  Appearance: Appropriate  Attention Span/Concentration: Attentive  Eye Contact: Engaged    Fund of Knowledge: Delayed   Language /Speech Content: Fluent  Language /Speech Volume: Normal  Language /Speech Rate/Productions: Slow  Recent Memory: Intact  Remote Memory: Intact  Mood: Depressed, Sad  Orientation to Person: Yes   Orientation to Place: Yes  Orientation to Time of Day: Yes  Orientation to Date: Yes (Pt stated \"September 14\")     Situation (Do they understand why they are here?): Yes  Psychomotor Behavior: Normal  Thought Content: Hallucinations, Suicidal (A/H 2 days ago with voice informing her to ingest excessive pills)  Thought Form: Intact     Mini-Cog Assessment  Number of Words Recalled:    Clock-Drawing Test:     Three Item Recall:    Mini-Cog " Total Score:       Medication  Psychotropic medications:   Medication Orders - Psychiatric (From admission, onward)      Start     Dose/Rate Route Frequency Ordered Stop    09/19/23 0800  escitalopram (LEXAPRO) tablet 10 mg         10 mg Oral DAILY 09/18/23 2058 09/18/23 2200  paliperidone ER (INVEGA) 24 hr tablet 9 mg         9 mg Oral AT BEDTIME 09/18/23 2058 09/18/23 2200  QUEtiapine (SEROquel) tablet 100 mg         100 mg Oral AT BEDTIME 09/18/23 2058 09/18/23 2200  traZODone (DESYREL) tablet 100 mg         100 mg Oral AT BEDTIME 09/18/23 2058 09/18/23 2051  hydrOXYzine (VISTARIL) capsule 25 mg         25 mg Oral EVERY 4 HOURS PRN 09/18/23 2058               Current Care Team  Patient Care Team:  Albino Rainey MD as PCP - General (Family Medicine)  Regina Heredia as   Liliana Miller PsyD as Psych Associate  Pavel Moore as County Worker  Tiburcio Chacon MD as MD (Ophthalmology)  Debbie Germain PA-C as Physician Assistant (Physician Assistant)  Eugenia De Jesus RPH as Pharmacist (Pharmacist)  [facility] ([place]  Stacy Bithell Goltz, Bennett Ezra, PA-C as Assigned Neuroscience Provider  Tiburcio Chacon MD as Assigned Surgical Provider  Gisselle Nash PA-C as Assigned OBGYN Provider  Eugenia De Jesus RPH as Assigned MTM Pharmacist  Aniya Mcgregor LICSW as  ( - Clinical)  Mari Contreras NP as Assigned Behavioral Health Provider  Albino Rainey MD as Assigned PCP  Luiz Hernandez DO as Assigned Musculoskeletal Provider  Jeny Weir as CADI worker  Edinson RN with pt group [facility] as     Diagnosis  Patient Active Problem List   Diagnosis Code    Osteopenia M85.80    Vitamin B12 deficiency without anemia E53.8    Hyperlipidemia LDL goal <100 E78.5    Rotator cuff syndrome M75.100    Type 2  diabetes mellitus with mild nonproliferative retinopathy (H) E11.3299    Illiterate Z55.0    Irritable bowel syndrome K58.9    overweight - BMI >35 E66.01    Takotsubo cardiomyopathy I51.81    CAD (coronary artery disease) I25.10    Restless legs syndrome (RLS) G25.81    CINDY (obstructive sleep apnea)- mild AHI 10.3 G47.33    Verbal auditory hallucination R44.0    Chronic low back pain M54.50, G89.29    Schizoaffective disorder, depressive type (H) F25.1    Migraine headache G43.909    Essential hypertension with goal blood pressure less than 130/80 I10    Health Care Home Z76.89    Lumbago M54.50    Cervicalgia M54.2    Cocaine abuse, episodic (H) F14.10    Suicidal ideation R45.851    Esophageal reflux K21.9    Mild nonproliferative diabetic retinopathy (H) E11.3299    Tardive dyskinesia G24.01    Alcohol use Z78.9    Left cataract H26.9    Falls frequently R29.6    History of uterine cancer Z85.42    Psychophysiological insomnia F51.04    Dysuria R30.0    Asymptomatic postmenopausal status Z78.0    Abdominal pain, right lower quadrant R10.31    Infectious encephalopathy G93.49, B99.9    Non-intractable vomiting with nausea R11.2    Thoughts of self harm R45.89    Gastroenteritis K52.9    Posttraumatic stress disorder F43.10    Cervical cancer screening Z12.4    Type 2 diabetes mellitus with stage 3 chronic kidney disease, with long-term current use of insulin (H) E11.22, N18.30, Z79.4    Positive AIDA (antinuclear antibody) R76.8    Hyperglycemia R73.9    Pneumonia J18.9    Depression with suicidal ideation F32.A, R45.851    Mixed stress and urge urinary incontinence N39.46    Chronic pain syndrome G89.4    Fibromyalgia M79.7    Type 2 diabetes mellitus without complication, with long-term current use of insulin (H) E11.9, Z79.4    Dehydration E86.0    Hypoglycemia E16.2    Acute kidney injury (H) N17.9    Diarrhea, unspecified type R19.7    2019 novel coronavirus disease (COVID-19) U07.1    Chronic pain G89.29     Has poorly balanced diet Z91.89    History of substance abuse (H) F19.11    History of suicidal behavior Z91.51    Acute pyelonephritis N10    Localized edema R60.0    Fatigue, unspecified type R53.83    Vitamin D deficiency E55.9    DDD (degenerative disc disease), lumbosacral M51.37    Impingement syndrome of shoulder region, right M75.41    Schizoaffective disorder, unspecified type (H) F25.9       Primary Problem This Admission  Active Hospital Problems    *Schizoaffective disorder, unspecified type (H)      Suicidal ideation        Clinical Summary and Substantiation of Recommendations   Due to pt experiencing intensive SI with thoughts of method and intensive sxs of depression, her level of need exceeds that which her outpt and group home providers can provide and without [facility] level of care, pt is likely to experience continued intensive sxs with risk of harm. Along with SI and thoughts of method, pt is experiencing nightly nightmares, hopelessness, helplessness, intense sadness with episodes of crying multiple times a day. Pt reports she is ruminating on thoughts of suicide and needs more intensive support to help her through this crisis.    Patient coping skills attempted to reduce the crisis:  Pt reports that she reached out to day program staff and group home staff for supports, and also called her oldest son for support and suggestions for coping. She reports all supports encouraged pt to go to ED for evaluation and obtain a higher level of care.    Disposition  Recommended disposition: Individual Therapy, Medication Management, Group Home (Pt recommended to return to group home when stabilized, and to establish oupt therapy and psychiatry)        Reviewed case and recommendations with attending provider. Attending Name: Ryan Fan CNP       Attending concurs with disposition: yes       Patient and/or validated legal guardian concurs with disposition:   yes       Final disposition:   observation    Legal status on admission: Voluntary/Patient has signed consent for treatment    Assessment Details   Total duration spent on the patient case in minutes: 37 min     CPT code(s) utilized: 11913 - Psychotherapy for Crisis - 60 (30-74*) min    Ben Paniagua Psychotherapist  [facility] - Triage & Transition Services  Callback: [phone]

## 2023-09-19 NOTE — ED PROVIDER NOTES
[facility] - Initial Psychiatric Observation Note  [facility] Emergency Department  Observation Initiation Date: Sep 18, 2023    Nena Tang MRN: [id number]   Age: 63 year old YOB: 1960     History     Chief Complaint   Patient presents with    Mental Health Problem     HPI  Nena Tang is a 63 year old female with a past history notable for schizoaffective disorder, depressive type, PTSD, CINDY, type II diabetes mellitus, COVID infection 1 month ago, among others. Patient presented to the emergency department for evaluation of suicidal ideation coinciding with the anniversary of her mother's passing. Patient was medically evaluated in the emergency department and determined to be medically stable for transfer to [facility] for further psychiatric assessment.     Here at [facility], patient presents as calm and cooperative. She is tearful and withdrawn. She reports that she recently began to experience worsening symptoms of depression, in addition to thoughts of wanting to harm herself. She reports that this time of year tends to more difficulty for her due to today being the anniversary of her mother's death, in addition to losing multiple other loved ones around September-October. Pt reports that she has been experiencing an increasingly depressed mood accompanied by frequent tearfulness. She reports feeling anxious throughout the day, describing her anxiety as constant. She reports she has been more fearful of going to sleep recently due to recurring nightmares. Reports recently she has been experiencing a nightmare of a shadow who she is trying to kill. She is sleeping around 4-5 hours per night. She endorses a reduced appetite. She endorses auditory hallucinations of voices, which are chronic, but have recently been commanding her to harm herself by overdose. She denies a current plan to harm herself here in the hospital and reports she has not harmed herself since living in the group home for  the past 3 years. She endorses adherence to her medications as administered by the group home but is not sure what she is taking or for what indication. She has not seen her psychotherapist in about 6 months. She is her own guardian. She is agreeable to remain on observation status overnight tonight.     Past Medical History  Past Medical History:   Diagnosis Date    Acute respiratory failure with hypoxia (H) 9/4/2017    CAD (coronary artery disease)     5/2014 cath, nonbostructive stenosis to LAD, RCA.    Chronic low back pain 1/22/2013    Cocaine abuse, in remission (H)     Fecal urgency 3/8/2012    History of heroin abuse (H)     Hyperlipidemia LDL goal <100 10/31/2010    Hypertension 7/29/2013    Illiterate 8/30/2011    Irritable bowel syndrome     Left cataract     Migraine 4/19/2012    Migraine headache 4/22/2013    Moderate major depression (H) 6/8/2011    Noncompliance with medication regimen 6/8/2011    Obesity     CINDY (obstructive sleep apnea) 3/8/2012    uses CPAP    CINDY (obstructive sleep apnea)- mild AHI 10.3     Osteopenia 10/7/2009    Pneumonia of right lower lobe due to infectious organism 9/4/2017    Schizoaffective disorder, depressive type (H) 2/25/2013    Sepsis (H) 8/29/2017    Suicidal intent 10/2/2013    Takotsubo cardiomyopathy     Type 2 diabetes mellitus (H) 8/30/2011    Uterine cancer (H) 1983    Verbal auditory hallucination 10/4/2012     Past Surgical History:   Procedure Laterality Date    CATARACT IOL, RT/LT Bilateral 2017    CHOLECYSTECTOMY      COLONOSCOPY N/A 3/16/2017    Procedure: COLONOSCOPY;  Surgeon: Traci Gonzalez MD;  Location: [facility]    Coronary CTA  5/21/2014    HYSTERECTOMY  1983    uterine cancer yearly pap's per provider.    HYSTERECTOMY      LAPAROSCOPIC CHOLECYSTECTOMY  2008    PHACOEMULSIFICATION CLEAR CORNEA WITH STANDARD INTRAOCULAR LENS IMPLANT Left 5/5/2017    Procedure: PHACOEMULSIFICATION CLEAR CORNEA WITH STANDARD INTRAOCULAR LENS IMPLANT;  LEFT  EYE PHACOEMULSIFICATION CLEAR CORNEA WITH STANDARD INTRAOCULAR LENS IMPLANT ;  Surgeon: Tyra Diaz MD;  Location:  [facility]    PHACOEMULSIFICATION CLEAR CORNEA WITH STANDARD INTRAOCULAR LENS IMPLANT Right 6/30/2017    Procedure: PHACOEMULSIFICATION CLEAR CORNEA WITH STANDARD INTRAOCULAR LENS IMPLANT;  RIGHT EYE PHACOEMULSIFICATION CLEAR CORNEA WITH STANDARD INTRAOCULAR LENS IMPLANT;  Surgeon: Tyra Diaz MD;  Location:  [facility]    RELEASE TRIGGER FINGER  10/11/2012    Left thumb. Procedure: RELEASE TRIGGER FINGER;  LEFT THUMB TRIGGER RELEASE;  Surgeon: Tay Langley MD;  Location:  [facility]    RELEASE TRIGGER FINGER Right 12/26/2016    Procedure: RELEASE TRIGGER FINGER;  Surgeon: Albino Castañeda MD;  Location:  OR    [facility] OOPHORECTORMY FOR RAHEL, W/BX  1983    UTERINE     acetaminophen (TYLENOL) 500 MG tablet  Alcohol Swabs (EASY TOUCH ALCOHOL PREP MEDIUM) 70 % PADS  amantadine (SYMMETREL) 100 MG capsule  aspirin (ASPIRIN LOW DOSE) 81 MG EC tablet  atorvastatin (LIPITOR) 80 MG tablet  calcium carbonate (OS-ALLEN) 1500 (600 Ca) MG tablet  clonazePAM (KLONOPIN) 0.5 MG tablet  Continuous Blood Gluc Sensor (DEXCOM G6 SENSOR) MISC  Continuous Blood Gluc Transmit (DEXCOM G6 TRANSMITTER) MISC  diclofenac (VOLTAREN) 1 % topical gel  docusate sodium (COLACE) 100 MG capsule  escitalopram (LEXAPRO) 20 MG tablet  estradiol (ESTRACE VAGINAL) 0.1 MG/GM vaginal cream  gabapentin (NEURONTIN) 300 MG capsule  hydrOXYzine (VISTARIL) 25 MG capsule  insulin glargine (LANTUS PEN) 100 UNIT/ML pen  insulin lispro (HUMALOG KWIKPEN) 100 UNIT/ML (1 unit dial) KWIKPEN  losartan (COZAAR) 100 MG tablet  magnesium 250 MG tablet  metoprolol succinate ER (TOPROL XL) 50 MG 24 hr tablet  mirabegron (MYRBETRIQ) 50 MG 24 hr tablet  MYRBETRIQ 25 MG 24 hr tablet  NOVOFINE AUTOCOVER PEN NEEDLE 30G X 8 MM miscellaneous  nystatin (MYCOSTATIN) 209878 UNIT/GM external powder  paliperidone ER (INVEGA) 9 MG 24 hr tablet  pantoprazole (PROTONIX) 40 MG  EC tablet  prazosin (MINIPRESS) 1 MG capsule  predniSONE (DELTASONE) 5 MG tablet  QUEtiapine (SEROQUEL) 100 MG tablet  senna-docusate (SENOKOT-S/PERICOLACE) 8.6-50 MG tablet  traZODone (DESYREL) 100 MG tablet  vitamin C (ASCORBIC ACID) 500 MG tablet  zinc oxide (DESITIN) 20 % external ointment  alendronate (FOSAMAX) 70 MG tablet  blood glucose (NO BRAND SPECIFIED) test strip  ondansetron (ZOFRAN) 4 MG tablet  OneTouch Delica Lancets 33G MISC  order for DME  order for DME  thin (NO BRAND SPECIFIED) lancets  TRULICITY 3 MG/0.5ML SOPN      Allergies   Allergen Reactions    Imidazole Antifungals Hives     Tolerates diflucan    Ketoprofen Itching     Pruritis to topical    Lisinopril Hives    Metformin Other (See Comments)     Patient hospitalized for lactic acidosis - admitting provider suspectd caused by metformin    Metronidazole Hives    Posaconazole Hives     Tolerates diflucan     Family History  Family History   Problem Relation Age of Onset    Cancer Mother         BLADDER    Respiratory Mother         COPD    Gastrointestinal Disease Mother         CIRRHOSIS OF LI BOLIVAR    Alcohol/Drug Mother     Diabetes Mother     Hypertension Mother     Lipids Mother     C.A.D. Mother     Glaucoma Mother     Alcohol/Drug Sister     Mental Illness Sister     Alcohol/Drug Sister     Psychotic Disorder Sister     Cancer Maternal Grandmother         UNKNOWN TYPE    Cancer Brother         COLON    Cancer - colorectal Brother         IN HIS LATE 30S    Alcohol/Drug Brother          OF HEROIN OVERDOSE AT AGE 22 YRS    Macular Degeneration No family hx of      Social History   Social History     Tobacco Use    Smoking status: Never    Smokeless tobacco: Never   Vaping Use    Vaping Use: Never used   Substance Use Topics    Alcohol use: Not Currently     Comment: when younger    Drug use: No     Comment: history of      Past medical history, past surgical history, medications, allergies, family history, and social history were  reviewed with the patient. No additional pertinent items.       Review of Systems  A medically appropriate review of systems was performed with pertinent positives and negatives noted in the HPI, and all other systems negative.    Physical Examination   BP: (!) 184/61  Pulse: 87  Temp: 98.2  F (36.8  C)  Resp: 20  Height: 152.4 cm (5')  Weight: 109.8 kg (242 lb)  SpO2: 95 %    Physical Exam  General: Appears stated age.   Neuro: Alert and fully oriented. Extremities appear to demonstrate normal strength on visual inspection.   Integumentary/Skin: no rash visualized, normal color    Psychiatric Examination   Appearance: awake, alert, adequately groomed, appeared as age stated, and casually dressed  Attitude:  cooperative  Eye Contact:  fair  Mood:  sad  and depressed  Affect:  mood congruent, intensity is blunted, and tearful  Speech:  clear, coherent  Psychomotor Behavior:  no evidence of tardive dyskinesia, dystonia, or tics  Thought Process:  linear  Associations:  no loose associations  Thought Content:   patient endorses ongoing passive suicidal ideation. Endorses recent active suicidal ideation with thoughts of slitting wrists. Denies homicidal ideation. Endorses auditory hallucinations of voices, some command in nature telling her to harm herself by overdosing on pills. Denies visual hallucinations. Denies paranoid delusions.   Insight:  fair  Judgement:  fair  Oriented to:  time, person, and place  Attention Span and Concentration:  fair  Recent and Remote Memory:  fair  Language: able to name/identify objects without impairment  Fund of Knowledge: intact with awareness of current and past events    ED Course        Labs Ordered and Resulted from Time of ED Arrival to Time of ED Departure - No data to display    Assessments & Plan (with Medical Decision Making)   Patient presenting with worsening symptoms of depression accompanied by suicidal ideation with thoughts of slitting her wrist. This episode  coincides with the anniversary of her mother's passing, which was today. Pt has also lost several other family members around September-October and this time of year tends to be challenging for her. She resides in a group home. Nursing notes reviewed noting no acute issues.     I have reviewed the assessment completed by the [facility].     During the observation period, the patient did not require medications for agitation, and did not require restraints/seclusion for patient and/or provider safety.     The patient was found to have a psychiatric condition that would benefit from an observation stay in the emergency department for further psychiatric stabilization and/or coordination of a safe disposition. The observation plan includes serial assessments of psychiatric condition, potential administration of medications if indicated, further disposition pending the patient's psychiatric course during the monitoring period.     Preliminary diagnosis:    ICD-10-CM    1. Suicidal ideation  R45.851       2. Schizoaffective disorder, depressive type (H)  F25.1       3. PTSD (post-traumatic stress disorder)  F43.10       4. Anxiety  F41.9            Treatment Plan:  - Prazosin had been on hold at the group home for unclear reason. Will restart at 1 mg tonight to target persistent nightmares. Titrate to efficacy.   - Continue Invega 9 mg at bedtime for antipsychotic treatment  - Continue quetiapine 100 mg at bedtime for sleep and mood stabilization  - Continue Lexapro 10 mg daily for antidepressant treatment  - Continue trazodone 100 mg at bedtime for sleep  - Continue gabapentin 300 mg at bedtime for anxiety  - Continue amantadine 100 mg AM and 200 mg at bedtime for parkinsonian symptoms  - Continue hydroxyzine 25 mg q4h prn for anxiety  - Continue clonazepam 0.5 mg nightly as needed for anxiety   - Insulin ordered  - Continue remainder of home medications unchanged  - I do not see lipids obtained within the past year. Will  order lipid panel for tomorrow morning. A1c was 6.7 in June 2023.   - Patient would benefit from re-engaging in outpatient psychotherapy for coping and support. She will also need an appointment for outpatient psychiatry. Recommend her polypharmacy be addressed on an outpatient basis.   - She will be registered to observation status overnight. Plan for reassessment tomorrow morning.     --  Ryan Fan CNP   [facility] EMERGENCY DEPT  [facility] Unit          Ryan Fan CNP  09/18/23 [id number]

## 2023-09-19 NOTE — ED NOTES
IP MH Referral Acuity Rating Score (RARS)    LMHP complete at referral to IP MH, with DEC; and, daily while awaiting IP MH placement. Call score to PPS.  CRITERIA SCORING   New 73 HH and Involuntary for IP MH (not adolescent) 0/1   Boarding over 24 hours 1/1   Vulnerable adult at least 55+ with multiple co morbidities; or, Patient age 11 or under 1/1   Suicide ideation without relief of precipitating factors 1/1   Current plan for suicide 1/1   Current plan for homicide 0/1   Imminent risk or actual attempt to seriously harm another without relief of factors precipitating the attempt 0/1   Severe dysfunction in daily living (ex: complete neglect for self care, extreme disruption in vegetative function, extreme deterioration in social interactions) 1/1   Recent (last 2 weeks) or current physical aggression in the ED 0/1   Restraints or seclusion episode in ED 0/1   Verbal aggression, agitation, yelling, etc., while in the ED 0/1   Active psychosis with psychomotor agitation or catatonia 0/1   Need for constant or near constant redirection (from leaving, from others, etc).  0/1   Intrusive or disruptive behaviors 0/1   TOTAL Acuity Total Score: 5     Carl Vargas, LORRAINE, LICSW

## 2023-09-19 NOTE — PROGRESS NOTES
"Triage & Transition Services [facility]     Progress Note    Patient: Nena goes by \"Nena,\" uses she/her pronouns  Date of Service: September 19, 2023  Site of Service:   Patient was seen in-person.     Presenting problem:  Please see initial DEC/[facility] Crisis Assessment completed by Ben Paniagua psychotherapist on 9/18/23 for complete assessment information. Notable concerns include thoughts of suicide without plan, significant depression and sadness around the anniversary of her mother's death, auditory hallucinations that have become command in nature, telling her to hurt herself.     Individuals Present: Nena & Maverick Miles [facility]    Session start: 1500  Session end: 1520  Session duration in minutes: 20  CPT utilized: 74623 - Psychotherapy (with patient) - 30 (16-37*) min    Current Presentation:   Continues to struggle with symptoms of depression, and auditory command hallucinations telling her to harm herself. Her affect is flat, mood depressed.    Additional Collateral Information:  None at this time.     Mental Status Exam     Affect: Flat   Appearance: Disheveled    Attention Span/Concentration: Attentive  Eye Contact: Engaged   Fund of Knowledge: Appropriate    Language /Speech Content: Fluent   Language /Speech Volume: Soft    Language /Speech Rate/Productions: Minimally Responsive    Recent Memory: Intact   Remote Memory: Intact   Mood: Depressed    Orientation to Person: Yes    Orientation to Place: Yes   Orientation to Time of Day: Yes    Orientation to Date: Yes    Situation (Do they understand why they are here?): Yes    Psychomotor Behavior: Underactive    Thought Content: Hallucinations and Suicidal   Thought Form: Intact     Diagnosis:   Schizoaffective disorder, depressive type (H)  F25.1       Therapeutic Intervention(s):   Provided active listening, unconditional positive regard, and validation. Engaged in safety planning.  Engaged in guided discovery, explored patient's " perspectives and helped expand them through socratic dialogue. Coached on coping techniques/relaxation skills to help improve distress tolerance and managing intense emotions. Provided positive reinforcement for progress towards goals, gains in knowledge, and application of skills previously taught.     Treatment Objective(s) Addressed:   The focus of this session was on rapport building, identifying and practicing coping strategies, processing feelings related to missing her mom and being tired of hearing voices.  We also worked on, assessing safety, and identifying treatment goals.     Progress Towards Goals:   Patient reports stable symptoms. Patient is making progress towards treatment goals as evidenced by being willing to engage with the treatment team and agree to medication adjustments.  She has also been willing to work on coping skills with the therapist.     Case Management:   None at this time     Plan and Clinical Summary and Substantiation of Recommendations:   Observation: Pt continues to struggle significantly with feelings of sadness around the loss of her mom, and symptoms of depression including lack of energy and motivation. Hopelessness and helplessness and difficulty sleeping. She also continues to have thoughts of suicide and is hearing voices more frequently and with more intensity, telling her to harm herself. Recommend pt remain on observation at this time for safety and stabilization.    Attending concurs with disposition: Yes         BIN Zelaya

## 2023-09-19 NOTE — PROGRESS NOTES
63 year old female with history of schizoaffective disorder and depression received from ED due to increased SI in the setting of missing several families especially since today is the anniversary of her mother's death. Wants to slit her wrists. Very tearful. Denies HI. Nursing and risk assessments completed. Assessments reviewed with LMHP and physician. Admission information reviewed with patient. Patient given a tour of [facility] and instructions on using the facility. Questions regarding [facility] addressed. Pt safety search completed. Call/voicemail placed to group home ([facility]- [phone] per staff who brought pt to ED) to assist in medication reconciliation.

## 2023-09-20 ENCOUNTER — HOSPITAL ENCOUNTER (INPATIENT)
Facility: CLINIC | Age: 63
LOS: 7 days | Discharge: NURSING, REHAB OR LONG-TERM CARE | DRG: 885 | End: 2023-09-27
Attending: PSYCHIATRY & NEUROLOGY | Admitting: PSYCHIATRY & NEUROLOGY
Payer: COMMERCIAL

## 2023-09-20 ENCOUNTER — TELEPHONE (OUTPATIENT)
Dept: BEHAVIORAL HEALTH | Facility: CLINIC | Age: 63
End: 2023-09-20
Payer: COMMERCIAL

## 2023-09-20 VITALS
OXYGEN SATURATION: 96 % | HEART RATE: 73 BPM | RESPIRATION RATE: 16 BRPM | DIASTOLIC BLOOD PRESSURE: 88 MMHG | TEMPERATURE: 97.9 F | HEIGHT: 60 IN | WEIGHT: 243 LBS | BODY MASS INDEX: 47.71 KG/M2 | SYSTOLIC BLOOD PRESSURE: 178 MMHG

## 2023-09-20 DIAGNOSIS — T43.505A NEUROLEPTIC-INDUCED TARDIVE DYSKINESIA: ICD-10-CM

## 2023-09-20 DIAGNOSIS — M35.3 POLYMYALGIA RHEUMATICA (H): ICD-10-CM

## 2023-09-20 DIAGNOSIS — N18.30 TYPE 2 DIABETES MELLITUS WITH STAGE 3 CHRONIC KIDNEY DISEASE, WITH LONG-TERM CURRENT USE OF INSULIN, UNSPECIFIED WHETHER STAGE 3A OR 3B CKD (H): ICD-10-CM

## 2023-09-20 DIAGNOSIS — H26.493 PCO (POSTERIOR CAPSULAR OPACIFICATION), BILATERAL: ICD-10-CM

## 2023-09-20 DIAGNOSIS — R29.898 WEAKNESS OF BOTH LOWER EXTREMITIES: ICD-10-CM

## 2023-09-20 DIAGNOSIS — G47.00 PERSISTENT INSOMNIA: ICD-10-CM

## 2023-09-20 DIAGNOSIS — N30.00 ACUTE CYSTITIS WITHOUT HEMATURIA: ICD-10-CM

## 2023-09-20 DIAGNOSIS — G24.01 NEUROLEPTIC-INDUCED TARDIVE DYSKINESIA: ICD-10-CM

## 2023-09-20 DIAGNOSIS — E11.9 TYPE 2 DIABETES MELLITUS WITHOUT COMPLICATION, WITH LONG-TERM CURRENT USE OF INSULIN (H): ICD-10-CM

## 2023-09-20 DIAGNOSIS — M75.41 IMPINGEMENT SYNDROME OF SHOULDER REGION, RIGHT: ICD-10-CM

## 2023-09-20 DIAGNOSIS — M79.7 FIBROMYALGIA: ICD-10-CM

## 2023-09-20 DIAGNOSIS — Z79.4 TYPE 2 DIABETES MELLITUS WITH MILD NONPROLIFERATIVE RETINOPATHY WITHOUT MACULAR EDEMA, WITH LONG-TERM CURRENT USE OF INSULIN, UNSPECIFIED LATERALITY (H): ICD-10-CM

## 2023-09-20 DIAGNOSIS — G43.011 INTRACTABLE MIGRAINE WITHOUT AURA AND WITH STATUS MIGRAINOSUS: ICD-10-CM

## 2023-09-20 DIAGNOSIS — Z79.4 TYPE 2 DIABETES MELLITUS WITH STAGE 3 CHRONIC KIDNEY DISEASE, WITH LONG-TERM CURRENT USE OF INSULIN, UNSPECIFIED WHETHER STAGE 3A OR 3B CKD (H): ICD-10-CM

## 2023-09-20 DIAGNOSIS — R35.0 URINARY FREQUENCY: ICD-10-CM

## 2023-09-20 DIAGNOSIS — E11.3299 TYPE 2 DIABETES MELLITUS WITH MILD NONPROLIFERATIVE RETINOPATHY WITHOUT MACULAR EDEMA, WITH LONG-TERM CURRENT USE OF INSULIN, UNSPECIFIED LATERALITY (H): ICD-10-CM

## 2023-09-20 DIAGNOSIS — R53.83 FATIGUE, UNSPECIFIED TYPE: ICD-10-CM

## 2023-09-20 DIAGNOSIS — F32.A DEPRESSION WITH SUICIDAL IDEATION: ICD-10-CM

## 2023-09-20 DIAGNOSIS — R45.851 DEPRESSION WITH SUICIDAL IDEATION: ICD-10-CM

## 2023-09-20 DIAGNOSIS — R44.0 VERBAL AUDITORY HALLUCINATION: ICD-10-CM

## 2023-09-20 DIAGNOSIS — F25.9 SCHIZOAFFECTIVE DISORDER, UNSPECIFIED TYPE (H): Primary | ICD-10-CM

## 2023-09-20 DIAGNOSIS — G89.4 CHRONIC PAIN SYNDROME: ICD-10-CM

## 2023-09-20 DIAGNOSIS — N39.46 MIXED STRESS AND URGE URINARY INCONTINENCE: ICD-10-CM

## 2023-09-20 DIAGNOSIS — R30.0 DYSURIA: ICD-10-CM

## 2023-09-20 DIAGNOSIS — Z79.4 TYPE 2 DIABETES MELLITUS WITHOUT COMPLICATION, WITH LONG-TERM CURRENT USE OF INSULIN (H): ICD-10-CM

## 2023-09-20 DIAGNOSIS — Z91.51 HISTORY OF SUICIDAL BEHAVIOR: ICD-10-CM

## 2023-09-20 DIAGNOSIS — F43.10 PTSD (POST-TRAUMATIC STRESS DISORDER): ICD-10-CM

## 2023-09-20 DIAGNOSIS — M54.2 CERVICALGIA: ICD-10-CM

## 2023-09-20 DIAGNOSIS — E11.22 TYPE 2 DIABETES MELLITUS WITH STAGE 3 CHRONIC KIDNEY DISEASE, WITH LONG-TERM CURRENT USE OF INSULIN, UNSPECIFIED WHETHER STAGE 3A OR 3B CKD (H): ICD-10-CM

## 2023-09-20 DIAGNOSIS — F51.04 PSYCHOPHYSIOLOGICAL INSOMNIA: ICD-10-CM

## 2023-09-20 LAB
ALBUMIN SERPL BCG-MCNC: 3.9 G/DL (ref 3.5–5.2)
ALP SERPL-CCNC: 63 U/L (ref 35–104)
ALT SERPL W P-5'-P-CCNC: 26 U/L (ref 0–50)
ANION GAP SERPL CALCULATED.3IONS-SCNC: 13 MMOL/L (ref 7–15)
AST SERPL W P-5'-P-CCNC: 13 U/L (ref 0–45)
BASOPHILS # BLD AUTO: 0 10E3/UL (ref 0–0.2)
BASOPHILS NFR BLD AUTO: 0 %
BILIRUB SERPL-MCNC: 0.2 MG/DL
BUN SERPL-MCNC: 32 MG/DL (ref 8–23)
CALCIUM SERPL-MCNC: 9.2 MG/DL (ref 8.8–10.2)
CHLORIDE SERPL-SCNC: 101 MMOL/L (ref 98–107)
CREAT SERPL-MCNC: 1.3 MG/DL (ref 0.51–0.95)
DEPRECATED HCO3 PLAS-SCNC: 22 MMOL/L (ref 22–29)
EGFRCR SERPLBLD CKD-EPI 2021: 46 ML/MIN/1.73M2
EOSINOPHIL # BLD AUTO: 0.1 10E3/UL (ref 0–0.7)
EOSINOPHIL NFR BLD AUTO: 1 %
ERYTHROCYTE [DISTWIDTH] IN BLOOD BY AUTOMATED COUNT: 13.1 % (ref 10–15)
GLUCOSE BLDC GLUCOMTR-MCNC: 157 MG/DL (ref 70–99)
GLUCOSE BLDC GLUCOMTR-MCNC: 178 MG/DL (ref 70–99)
GLUCOSE BLDC GLUCOMTR-MCNC: 180 MG/DL (ref 70–99)
GLUCOSE BLDC GLUCOMTR-MCNC: 245 MG/DL (ref 70–99)
GLUCOSE BLDC GLUCOMTR-MCNC: 252 MG/DL (ref 70–99)
GLUCOSE SERPL-MCNC: 200 MG/DL (ref 70–99)
HCT VFR BLD AUTO: 31.2 % (ref 35–47)
HGB BLD-MCNC: 10 G/DL (ref 11.7–15.7)
IMM GRANULOCYTES # BLD: 0.1 10E3/UL
IMM GRANULOCYTES NFR BLD: 1 %
LYMPHOCYTES # BLD AUTO: 1.7 10E3/UL (ref 0.8–5.3)
LYMPHOCYTES NFR BLD AUTO: 20 %
MCH RBC QN AUTO: 32.9 PG (ref 26.5–33)
MCHC RBC AUTO-ENTMCNC: 32.1 G/DL (ref 31.5–36.5)
MCV RBC AUTO: 103 FL (ref 78–100)
MONOCYTES # BLD AUTO: 0.8 10E3/UL (ref 0–1.3)
MONOCYTES NFR BLD AUTO: 10 %
NEUTROPHILS # BLD AUTO: 5.6 10E3/UL (ref 1.6–8.3)
NEUTROPHILS NFR BLD AUTO: 68 %
NRBC # BLD AUTO: 0 10E3/UL
NRBC BLD AUTO-RTO: 0 /100
PLATELET # BLD AUTO: 166 10E3/UL (ref 150–450)
POTASSIUM SERPL-SCNC: 4.9 MMOL/L (ref 3.4–5.3)
PROT SERPL-MCNC: 6.4 G/DL (ref 6.4–8.3)
RBC # BLD AUTO: 3.04 10E6/UL (ref 3.8–5.2)
SODIUM SERPL-SCNC: 136 MMOL/L (ref 136–145)
WBC # BLD AUTO: 8.3 10E3/UL (ref 4–11)

## 2023-09-20 PROCEDURE — 250N000012 HC RX MED GY IP 250 OP 636 PS 637: Performed by: NURSE PRACTITIONER

## 2023-09-20 PROCEDURE — 82962 GLUCOSE BLOOD TEST: CPT

## 2023-09-20 PROCEDURE — 93010 ELECTROCARDIOGRAM REPORT: CPT | Performed by: INTERNAL MEDICINE

## 2023-09-20 PROCEDURE — 82306 VITAMIN D 25 HYDROXY: CPT

## 2023-09-20 PROCEDURE — 99207 PR NO BILLABLE SERVICE THIS VISIT: CPT | Performed by: PHYSICIAN ASSISTANT

## 2023-09-20 PROCEDURE — 36415 COLL VENOUS BLD VENIPUNCTURE: CPT | Performed by: PSYCHIATRY & NEUROLOGY

## 2023-09-20 PROCEDURE — 99222 1ST HOSP IP/OBS MODERATE 55: CPT

## 2023-09-20 PROCEDURE — 250N000012 HC RX MED GY IP 250 OP 636 PS 637

## 2023-09-20 PROCEDURE — 36415 COLL VENOUS BLD VENIPUNCTURE: CPT

## 2023-09-20 PROCEDURE — 250N000013 HC RX MED GY IP 250 OP 250 PS 637

## 2023-09-20 PROCEDURE — 93005 ELECTROCARDIOGRAM TRACING: CPT

## 2023-09-20 PROCEDURE — 250N000013 HC RX MED GY IP 250 OP 250 PS 637: Performed by: NURSE PRACTITIONER

## 2023-09-20 PROCEDURE — 99223 1ST HOSP IP/OBS HIGH 75: CPT | Mod: AI | Performed by: PSYCHIATRY & NEUROLOGY

## 2023-09-20 PROCEDURE — 85025 COMPLETE CBC W/AUTO DIFF WBC: CPT | Performed by: PSYCHIATRY & NEUROLOGY

## 2023-09-20 PROCEDURE — 124N000002 HC R&B MH UMMC

## 2023-09-20 PROCEDURE — 80053 COMPREHEN METABOLIC PANEL: CPT | Performed by: NURSE PRACTITIONER

## 2023-09-20 PROCEDURE — G0378 HOSPITAL OBSERVATION PER HR: HCPCS

## 2023-09-20 RX ORDER — GABAPENTIN 300 MG/1
300 CAPSULE ORAL AT BEDTIME
Status: DISCONTINUED | OUTPATIENT
Start: 2023-09-20 | End: 2023-09-27 | Stop reason: HOSPADM

## 2023-09-20 RX ORDER — ATORVASTATIN CALCIUM 80 MG/1
80 TABLET, FILM COATED ORAL EVERY EVENING
Status: DISCONTINUED | OUTPATIENT
Start: 2023-09-20 | End: 2023-09-27 | Stop reason: HOSPADM

## 2023-09-20 RX ORDER — METOPROLOL SUCCINATE 25 MG/1
100 TABLET, EXTENDED RELEASE ORAL AT BEDTIME
Status: DISCONTINUED | OUTPATIENT
Start: 2023-09-20 | End: 2023-09-27 | Stop reason: HOSPADM

## 2023-09-20 RX ORDER — PREDNISONE 5 MG/1
5 TABLET ORAL 3 TIMES DAILY
Status: DISCONTINUED | OUTPATIENT
Start: 2023-09-20 | End: 2023-09-21

## 2023-09-20 RX ORDER — METOPROLOL SUCCINATE 25 MG/1
50 TABLET, EXTENDED RELEASE ORAL DAILY
Status: DISCONTINUED | OUTPATIENT
Start: 2023-09-21 | End: 2023-09-27 | Stop reason: HOSPADM

## 2023-09-20 RX ORDER — LOSARTAN POTASSIUM 25 MG/1
100 TABLET ORAL DAILY
Status: DISCONTINUED | OUTPATIENT
Start: 2023-09-21 | End: 2023-09-27 | Stop reason: HOSPADM

## 2023-09-20 RX ORDER — OLANZAPINE 10 MG/2ML
10 INJECTION, POWDER, FOR SOLUTION INTRAMUSCULAR 3 TIMES DAILY PRN
Status: DISCONTINUED | OUTPATIENT
Start: 2023-09-20 | End: 2023-09-27 | Stop reason: HOSPADM

## 2023-09-20 RX ORDER — AMANTADINE HYDROCHLORIDE 100 MG/1
200 CAPSULE, GELATIN COATED ORAL AT BEDTIME
Status: DISCONTINUED | OUTPATIENT
Start: 2023-09-20 | End: 2023-09-25

## 2023-09-20 RX ORDER — ASPIRIN 81 MG/1
81 TABLET ORAL DAILY
Status: DISCONTINUED | OUTPATIENT
Start: 2023-09-21 | End: 2023-09-27 | Stop reason: HOSPADM

## 2023-09-20 RX ORDER — CYPROHEPTADINE HYDROCHLORIDE 4 MG/1
4 TABLET ORAL AT BEDTIME
Status: DISCONTINUED | OUTPATIENT
Start: 2023-09-20 | End: 2023-09-22

## 2023-09-20 RX ORDER — AMOXICILLIN 250 MG
1 CAPSULE ORAL AT BEDTIME
Status: DISCONTINUED | OUTPATIENT
Start: 2023-09-20 | End: 2023-09-27 | Stop reason: HOSPADM

## 2023-09-20 RX ORDER — PREDNISONE 5 MG/1
5 TABLET ORAL 3 TIMES DAILY
Status: DISCONTINUED | OUTPATIENT
Start: 2023-09-20 | End: 2023-09-20

## 2023-09-20 RX ORDER — PALIPERIDONE 3 MG/1
12 TABLET, EXTENDED RELEASE ORAL AT BEDTIME
Status: DISCONTINUED | OUTPATIENT
Start: 2023-09-20 | End: 2023-09-27 | Stop reason: HOSPADM

## 2023-09-20 RX ORDER — MIRABEGRON 25 MG/1
50 TABLET, FILM COATED, EXTENDED RELEASE ORAL DAILY
Status: DISCONTINUED | OUTPATIENT
Start: 2023-09-21 | End: 2023-09-27 | Stop reason: HOSPADM

## 2023-09-20 RX ORDER — MULTIVIT,TX WITH IRON,MINERALS
250 TABLET, EXTENDED RELEASE ORAL AT BEDTIME
Status: DISCONTINUED | OUTPATIENT
Start: 2023-09-20 | End: 2023-09-27 | Stop reason: HOSPADM

## 2023-09-20 RX ORDER — HYDROXYZINE HYDROCHLORIDE 25 MG/1
25 TABLET, FILM COATED ORAL EVERY 4 HOURS PRN
Status: DISCONTINUED | OUTPATIENT
Start: 2023-09-20 | End: 2023-09-27 | Stop reason: HOSPADM

## 2023-09-20 RX ORDER — TRAZODONE HYDROCHLORIDE 100 MG/1
100 TABLET ORAL AT BEDTIME
Status: DISCONTINUED | OUTPATIENT
Start: 2023-09-20 | End: 2023-09-27 | Stop reason: HOSPADM

## 2023-09-20 RX ORDER — ACETAMINOPHEN 325 MG/1
650 TABLET ORAL EVERY 4 HOURS PRN
Status: CANCELLED | OUTPATIENT
Start: 2023-09-20

## 2023-09-20 RX ORDER — PANTOPRAZOLE SODIUM 40 MG/1
40 TABLET, DELAYED RELEASE ORAL DAILY
Status: DISCONTINUED | OUTPATIENT
Start: 2023-09-21 | End: 2023-09-27 | Stop reason: HOSPADM

## 2023-09-20 RX ORDER — CLONAZEPAM 0.5 MG/1
0.5 TABLET ORAL
Status: DISCONTINUED | OUTPATIENT
Start: 2023-09-20 | End: 2023-09-27 | Stop reason: HOSPADM

## 2023-09-20 RX ORDER — ESTRADIOL 0.1 MG/G
2 CREAM VAGINAL AT BEDTIME
Status: DISCONTINUED | OUTPATIENT
Start: 2023-09-20 | End: 2023-09-27 | Stop reason: HOSPADM

## 2023-09-20 RX ORDER — NICOTINE POLACRILEX 4 MG
15-30 LOZENGE BUCCAL
Status: DISCONTINUED | OUTPATIENT
Start: 2023-09-20 | End: 2023-09-27 | Stop reason: HOSPADM

## 2023-09-20 RX ORDER — QUETIAPINE FUMARATE 100 MG/1
100 TABLET, FILM COATED ORAL AT BEDTIME
Status: DISCONTINUED | OUTPATIENT
Start: 2023-09-20 | End: 2023-09-22

## 2023-09-20 RX ORDER — ESCITALOPRAM OXALATE 10 MG/1
10 TABLET ORAL DAILY
Status: DISCONTINUED | OUTPATIENT
Start: 2023-09-21 | End: 2023-09-20

## 2023-09-20 RX ORDER — ACETAMINOPHEN 500 MG
1000 TABLET ORAL 2 TIMES DAILY
Status: DISCONTINUED | OUTPATIENT
Start: 2023-09-20 | End: 2023-09-23

## 2023-09-20 RX ORDER — DOCUSATE SODIUM 100 MG/1
100 CAPSULE, LIQUID FILLED ORAL 2 TIMES DAILY
Status: DISCONTINUED | OUTPATIENT
Start: 2023-09-20 | End: 2023-09-27 | Stop reason: HOSPADM

## 2023-09-20 RX ORDER — AMANTADINE HYDROCHLORIDE 100 MG/1
100 CAPSULE, GELATIN COATED ORAL DAILY
Status: DISCONTINUED | OUTPATIENT
Start: 2023-09-21 | End: 2023-09-27 | Stop reason: HOSPADM

## 2023-09-20 RX ORDER — DEXTROSE MONOHYDRATE 25 G/50ML
25-50 INJECTION, SOLUTION INTRAVENOUS
Status: DISCONTINUED | OUTPATIENT
Start: 2023-09-20 | End: 2023-09-27 | Stop reason: HOSPADM

## 2023-09-20 RX ORDER — ALENDRONATE SODIUM 70 MG/1
70 TABLET ORAL
Status: DISCONTINUED | OUTPATIENT
Start: 2023-09-20 | End: 2023-09-27 | Stop reason: HOSPADM

## 2023-09-20 RX ORDER — OLANZAPINE 10 MG/1
10 TABLET ORAL 3 TIMES DAILY PRN
Status: DISCONTINUED | OUTPATIENT
Start: 2023-09-20 | End: 2023-09-27 | Stop reason: HOSPADM

## 2023-09-20 RX ADMIN — LOSARTAN POTASSIUM 100 MG: 50 TABLET, FILM COATED ORAL at 07:09

## 2023-09-20 RX ADMIN — AMANTADINE HYDROCHLORIDE 200 MG: 100 CAPSULE ORAL at 21:52

## 2023-09-20 RX ADMIN — METOPROLOL SUCCINATE 100 MG: 25 TABLET, FILM COATED, EXTENDED RELEASE ORAL at 21:49

## 2023-09-20 RX ADMIN — ESCITALOPRAM OXALATE 10 MG: 10 TABLET ORAL at 07:09

## 2023-09-20 RX ADMIN — ATORVASTATIN CALCIUM 80 MG: 80 TABLET, FILM COATED ORAL at 20:37

## 2023-09-20 RX ADMIN — CYPROHEPTADINE HYDROCHLORIDE 4 MG: 4 TABLET ORAL at 22:08

## 2023-09-20 RX ADMIN — Medication 250 MG: at 22:28

## 2023-09-20 RX ADMIN — PALIPERIDONE 12 MG: 3 TABLET, EXTENDED RELEASE ORAL at 22:08

## 2023-09-20 RX ADMIN — INSULIN GLARGINE 33 UNITS: 100 INJECTION, SOLUTION SUBCUTANEOUS at 07:14

## 2023-09-20 RX ADMIN — MIRABEGRON 50 MG: 50 TABLET, FILM COATED, EXTENDED RELEASE ORAL at 07:15

## 2023-09-20 RX ADMIN — PREDNISONE 5 MG: 5 TABLET ORAL at 14:11

## 2023-09-20 RX ADMIN — TRAZODONE HYDROCHLORIDE 100 MG: 100 TABLET ORAL at 22:08

## 2023-09-20 RX ADMIN — INSULIN ASPART 2 UNITS: 100 INJECTION, SOLUTION INTRAVENOUS; SUBCUTANEOUS at 22:10

## 2023-09-20 RX ADMIN — PREDNISONE 5 MG: 5 TABLET ORAL at 20:37

## 2023-09-20 RX ADMIN — SENNOSIDES AND DOCUSATE SODIUM 1 TABLET: 50; 8.6 TABLET ORAL at 22:07

## 2023-09-20 RX ADMIN — GABAPENTIN 300 MG: 300 CAPSULE ORAL at 22:07

## 2023-09-20 RX ADMIN — AMANTADINE HYDROCHLORIDE 100 MG: 100 CAPSULE ORAL at 07:14

## 2023-09-20 RX ADMIN — ACETAMINOPHEN 1000 MG: 500 TABLET, FILM COATED ORAL at 20:37

## 2023-09-20 RX ADMIN — ASPIRIN 81 MG: 81 TABLET, COATED ORAL at 07:09

## 2023-09-20 RX ADMIN — QUETIAPINE FUMARATE 100 MG: 100 TABLET ORAL at 22:07

## 2023-09-20 RX ADMIN — INSULIN GLARGINE 33 UNITS: 100 INJECTION, SOLUTION SUBCUTANEOUS at 22:15

## 2023-09-20 RX ADMIN — DOCUSATE SODIUM 100 MG: 100 CAPSULE, LIQUID FILLED ORAL at 20:37

## 2023-09-20 RX ADMIN — ACETAMINOPHEN 1000 MG: 500 TABLET, FILM COATED ORAL at 07:09

## 2023-09-20 RX ADMIN — PREDNISONE 5 MG: 5 TABLET ORAL at 07:09

## 2023-09-20 RX ADMIN — PANTOPRAZOLE SODIUM 40 MG: 40 TABLET, DELAYED RELEASE ORAL at 07:09

## 2023-09-20 RX ADMIN — Medication 600 MG: at 07:15

## 2023-09-20 RX ADMIN — METOPROLOL SUCCINATE 50 MG: 50 TABLET, EXTENDED RELEASE ORAL at 07:09

## 2023-09-20 ASSESSMENT — ACTIVITIES OF DAILY LIVING (ADL)
NUMBER_OF_TIMES_PATIENT_HAS_FALLEN_WITHIN_LAST_SIX_MONTHS: 4
DIFFICULTY_EATING/SWALLOWING: NO
ADLS_ACUITY_SCORE: 57
LAUNDRY: WITH SUPERVISION
ADLS_ACUITY_SCORE: 37
ADLS_ACUITY_SCORE: 37
FALL_HISTORY_WITHIN_LAST_SIX_MONTHS: YES
ADLS_ACUITY_SCORE: 42
ADLS_ACUITY_SCORE: 42
HYGIENE/GROOMING: INDEPENDENT
ADLS_ACUITY_SCORE: 57
ADLS_ACUITY_SCORE: 37
ADLS_ACUITY_SCORE: 37
WEAR_GLASSES_OR_BLIND: YES
ORAL_HYGIENE: INDEPENDENT
ADLS_ACUITY_SCORE: 42
CONCENTRATING,_REMEMBERING_OR_MAKING_DECISIONS_DIFFICULTY: NO
DOING_ERRANDS_INDEPENDENTLY_DIFFICULTY: NO
DRESS: INDEPENDENT;SCRUBS (BEHAVIORAL HEALTH)
ADLS_ACUITY_SCORE: 42
TOILETING_ISSUES: NO
WALKING_OR_CLIMBING_STAIRS_DIFFICULTY: NO
VISION_MANAGEMENT: GLASSESS
DRESSING/BATHING_DIFFICULTY: NO

## 2023-09-20 NOTE — PROGRESS NOTES
Em[facility] Group Progress Note    Client Name: Nena Tang  Date: September 19, 2023  Service Type:  Group Therapy  Facilitator:me@ SHRADDHA Su        Response:  Patient did not participate in group.      Ben Paniagua

## 2023-09-20 NOTE — TELEPHONE ENCOUNTER
12:08am Intake paged the resident.     11:35pm Intake received a call from the resident requesting: Recheck on BP, glucose, CMP, CBC,     1:03am Intake paged the resident.     1:36am Intake received a call from the resident requesting a BP check.     1:40am Intake called [facility] and requested this to be rechecked.     2:15am Intake paged the resident.

## 2023-09-20 NOTE — PROVIDER NOTIFICATION
09/20/23 1200   Individualization/Patient Specific Goals   Patient Personal Strengths expressive of emotions;expressive of needs   Patient Vulnerabilities lacks insight into illness;poor physical health;recent loss   Anxieties, Fears or Concerns SI, AH, grief and loss   Individualized Care Needs Encourage pt to do ADL's   Patient/Family-Specific Goals (Include Timeframe) Wants to feel safe with self, wants referrals for outpatient mental health providers   Interprofessional Rounds   Summary New pt, reason for admission   Participants CTC;psychiatrist;other (see comments);nursing   Behavioral Team Discussion   Participants Dr. Giang, Psychiatry resident, 3rd year med students, Pharmacy Resident, Valeria SWANSON, Trinidad JIN, Z[facility]   Progress Initial assessment   Anticipated length of stay 3-5 days   Continued Stay Criteria/Rationale Admitted for SI and AH. Needs clinical stabilization and medication mgmt   Medical/Physical Diabetes   Precautions See below   Plan Psychiatry Team will meet with patient daily to assess psychiatric needs and to discuss medication options/side effects; during hospitalization patient will be encouraged to attend therapy groups and to participate in unit programming. CTC will coordinate disposition and aftercare plan   Rationale for change in precautions or plan None   Safety Plan See below, per unit protocol   Anticipated Discharge Disposition group home     PRECAUTIONS AND SAFETY    Behavioral Orders   Procedures    Code 1 - Restrict to Unit    Fall precautions     Pt has been experiencing orthostatic symptoms    Routine Programming     As clinically indicated    Status 15     Every 15 minutes.    Suicide precautions     Patients on Suicide Precautions should have a Combination Diet ordered that includes a Diet selection(s) AND a Behavioral Tray selection for Safe Tray - with utensils, or Safe Tray - NO utensils         Safety  Safety WDL: WDL  [place] [facility] [place],  dining [place]  Suicidality: Status 15

## 2023-09-20 NOTE — PROGRESS NOTES
Report was given to DAVIN Napier @ [facility], they are ready to take pt at anytime. BLS was paged out, currently wait time-but they will call when have ETA. Pt is aware she will be transferring tonight.

## 2023-09-20 NOTE — PROGRESS NOTES
EMS here to transfer patient to [place] station 20. /88, HR 73, O2 96%. Pt voluntarily got up and moved to stretcher. Handoff report given to Anuja. Belongings transferred along with patient on stretcher. Receiving unit notified with time update.

## 2023-09-20 NOTE — TELEPHONE ENCOUNTER
03:13 - On call resident accepts for 20NB. Placed pt in queue    R: 20 / Rahat    03:17 - Notified unit CRN  03:19 - Notified [facility]

## 2023-09-20 NOTE — PROGRESS NOTES
Pt continues to wait for transport, she is awake having breakfast. AM medications and insulin given, glucose was 157.

## 2023-09-20 NOTE — PLAN OF CARE
Pt tearful throughout shift. Endorses command auditory hallucinations. Did not want to further elaborate. Med compliant. Ate dinner. Blood sugar covered. Self care (I.e brushing teeth, showering etc) encouraged but pt declined. Shuffling gait baseline but doing better than this morning. Endorses baseline dizziness upon standing. BP elevated. Awaiting IP bed.

## 2023-09-20 NOTE — TELEPHONE ENCOUNTER
R: [place]  Access Inpatient Bed Call Log 9/19/23 9:40PM [facility] Only  Intake has called facilities that have not updated the bed status within the last 12 hours.                             [facility] is at capacity.             [facility] is posting 0 beds. [phone]. Per Getachew at 9:27PM, at capacity   [facility] is posting 0 beds. Negative covid required.  Per call with Pat at 9:25PM no beds available.     [facility] is posting 0 bed. Negative covid required. [phone]. Per Karolina at 9:28PM closed to outside referrals due to Covid.     [facility] is posting 0 beds.   Per call with Pat at 9:25PM no beds available.       [facility] is posting 0 beds. [phone]. Per call with Caron at 9:29PM, at capacity.   [facility] is posting 0 beds.    Per call with Pat at 9:25PM no beds available.          [facility] is posting 0 beds.   Per Pat at 9:25PM no beds available.       [facility] is posting 0 beds. Low acuity only.  Per call with Pat at 9:25PM at capacity, try back tomorrow at 9AM.            Pt remains on the work list pending appropriate bed availability.
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
## 2023-09-20 NOTE — PLAN OF CARE
"Pt was visible in the milieu watching TV. Affect flat  but brightened on approach.  Cooperative with assessments. Med compliant. Denied pain. Reported headache and requested Tylenol. Headache managed with scheduled tylenol. Endorsed auditory hallucination, stated, \"l  am hearing voices asking me to hurt myself and is giving me a headache but l have no intent of hurting myself.\" Denies visual hallucination. Endorsed anxiety 6/10 and depression 8/10.  Nutrition and intake adequate.  and 245. Vitals stable. No behavior concerns noted. No safety concerns noted.  Problem: Plan of Care - These are the overarching goals to be used throughout the patient stay.    Goal: Plan of Care Review  Description: The Plan of Care Review/Shift note should be completed every shift.  The Outcome Evaluation is a brief statement about your assessment that the patient is improving, declining, or no change.  This information will be displayed automatically on your shift note.  Outcome: Progressing       Problem: Psychotic Signs/Symptoms  Goal: Improved Behavioral Control (Psychotic Signs/Symptoms)  Outcome: Not Progressing   Outcome: Progressing   Goal Outcome Evaluation:                        "

## 2023-09-20 NOTE — PROVIDER NOTIFICATION
..A   09/20/23 1302   Patient Belongings   Disposition of meds  Sent to security/pharmacy per site process   Patient Belongings locker;sent to security per site process;remains with patient   Patient Belongings Remaining with Patient glasses   Patient Belongings Put in Hospital Secure Location (Security or Locker, etc.) medical/assistive equipment;purse/wallet;shoes;clothing;cash/credit card   Belongings Search Yes   Clothing Search Yes   Second Staff Bertha                  X1 purple wallet  1 cell phone  1 pair white sandals  1 pair of glasses  Medical health insurance card  SSD card x2  1 blood glucise meter'1 set of keys    Sent to security envelope #[id number]- [employer] gift card, [place] bank Visa #[id number], [place] EBT card, 10$ in cash    Sent to Pharmacy-x2 Humalog insulin pen         Admission:  I am responsible for any personal items that are not sent to the safe or pharmacy.  [facility] is not responsible for loss, theft or damage of any property in my possession.    Signature:  _________________________________ Date: _______  Time: _____                                              Staff Signature:  ____________________________ Date: ________  Time: _____      2nd Staff person, if patient is unable/unwilling to sign:    Signature: ________________________________ Date: ________  Time: _____     Discharge:  [facility] has returned all of my personal belongings:    Signature: _________________________________ Date: ________  Time: _____                                          Staff Signature:  ____________________________ Date: ________  Time: _____

## 2023-09-20 NOTE — PROGRESS NOTES
Return call back from [facility], pt accepted [facility], Dr Giang, [phone]. Staff is to phone us for report.

## 2023-09-20 NOTE — CONSULTS
Brief Diabetes Note - FULL CONSULT TO FOLLOW IN AM:     Diabetes service has reviewed salient aspects of care, BG trend and insulin plan today.   Appropriate adjustments made to assist with currently hyperglycemia      Consult request for:insulin dose + recs, T2DM w retinopathy, recently started on long-term daily pred for PMR, poor glycemic control with worse A1C. Appreciate recs. Thank you!       Current BG:      Recent Labs:  Creat=1.30, GFR=46AG=13 and C02=22, LFT=normal range, WBC=8.3 and hgb=10.0      Briefly connected with team, Dr Kaufman.  PTA diabetes medications were( on chart review):  Lantus 33 units bid  Humalog 8 units tid with meals   Humalog 3 units for every 50 mg/dl over 150  Trulicity 3 mg every 7 days    Anticipated Discharge:  In 2-3 days, 9/22/203      Steroids: Presumed PMR  Prednisone 5 mg po tid  Fluid with dextrose      Diet:Regular diet      Assessment:    Nena Tang is a 63 year old women with a past history notable for schizoaffective disorder, depressive type, PTSD, CINDY, type II diabetes mellitus, COVID infection 1 month ago, among others. Patient presented to the emergency department for evaluation of suicidal ideation        1.  T2DM  Most recent A1c 6.7% on 6/20/2023--> repeat order for 9/21/2023    2.  Hyperglycemia exacerbated by stress/steroids    3.  elevated BMI:  BMI 47    4.  Anemia; hgb 10.0  5. Steroid induced hyperglycemia        Recommendations/Plan:   Full consult to follow in AM       -  Add Novolog  cho starting at dinner tonight, 1:8 with meals and snacks    -  Trending BG over the interval and could   -  BG checks TID AC/HS/0200    -  Increase correction scale to Novolog high resistance continue 1/25 TID AC/HS   - Would hold Trulicity and add Victoza if patient ok with daily GLP1 injection    -  Test claim; none    -Add Novolog carb correction 1:8    -  Recommend cho counting protocol    -  Hypoglycemia protocol    -  Education:  Will need education, newly  diagnosed and will need insulin for home. Ordered     -  Full discussion to follow in AM-- not sure what A1c Provider referring too but when reviewing A1c she is very well controlled, <7.  Would have primary discuss with patient if okay to get Victoza in patient and when last dose of Trulicity was. No sure this need to be a full consult but entered the above recommendations including carb coverage and increased correction scale.    Erin Kline PA-C  Inpatient Diabetes Management Service  Pager - [phone]  Date: 9/20/23        To contact Endocrine Diabetes service:   From 8AM-4PM: page inpatient diabetes provider that is following the patient that day (see filed or incomplete progress notes/consult notes).  If uncertain of provider assignment: page job code [phone].  For questions or updates from 4PM-8AM: page the diabetes job code for on call fellow: [phone]     Please notify inpatient diabetes service if changes are planned to steroids, nutrition, or if procedures are planned requiring prolonged NPO status.Diabetes Management Team job code: [phone]        Review of prior external note(s) from - [facility] or Care Everywhere   30 minutes spent by me on the date of the encounter doing chart review, history and exam, speaking with primary,documentation and further activities per the note  Bill [id number]  Medical Decision Making            Over 50% of my time on the unit was spent counseling the patient and/or coordinating care regarding acute hyperglycemia management.  See note for details.

## 2023-09-20 NOTE — CARE PLAN
"[facility] Occupational Therapy Group Intervention Note     09/20/23 1444   Group Therapy Session   Group Attendance attended group session   Time Session Began 1315   Time Session Ended 1400   Total Time (minutes) 30 (no charge)   Group Type task skill;life skill   Group Topic Covered coping skills/lifestyle management;relapse prevention;cognitive activities;leisure exploration/use of leisure time   Group Session Detail Clinic - coping skill exploration, creative expression within personally meaningful activities, and observation of social, cognitive, and kinesthetic performance skills.    Patient Response/Contribution cooperative with task;organized;listened actively   Patient Participation Detail Reserved, even, polite demeanor. Pt noticed peers engaged in this morning's wellness collage activity which she decided to initiate for herself. Worked at a slow pace while she sequenced two-step task to select and cut personally meaningful images / words to include in collage. Pt selected ~3 food items so far; collage to be completed at a later date. Reportedly enjoys similar activities such as Bingo, coloring etc. at the \"\" that she attends.      Tyra Edmonds OT on 9/20/2023 at 2:51 PM    "

## 2023-09-20 NOTE — PROGRESS NOTES
Pt admitted to station 20 via ED. Pt cooperative with search and admit interview.  Pt given tour of unit and given hygiene products.  Pt reports increase in auditory hallucinations. Pt reports voices tell her to hurt self. Pt states has no intent to hurt herself.  Pt reports had 5 family members die in 3 months.  Pt family  in .  This is anniversary of her mom's death.  Pt reports lives in a group home and has lived there x20 years.  Pt states likes the group home.  Pt medical hx includes DM. HTN.  Pt eating well, attending, groups, had several phone calls.

## 2023-09-20 NOTE — PLAN OF CARE
INITIAL PSYCHOSOCIAL ASSESSMENT AND NOTE    Information for assessment was obtained from:       [x]Patient     []Parent     [x]Community provider    []Hospital records   []Other     []Guardian       Presenting Problem:  Patient is a 63 year old female who uses she/her. Patient was admitted to [facility] voluntarily on 9/20/2023.     Presenting issues and presentation for admit: Patient presented to the ED due to concerns for worsening depression, AH, and SI in the context of psychosocial stressors which include the death anniversary of her mother and siblings. Patient lives in a group home and they recommended that patient needed to be assessed after disclosing increasing SI with plan to cut her wrists. Patient has been experiencing significant grief after the passing of her mother and  in 2014, as well as 4 of her siblings shortly after. Today, pt's affect if flat and her mood is depressed. She was tearful throughout the interview. Patient reports sadness due to her losses, she also endorsed AH, she states hearing voices telling her to kill her self and ways to carry out the plan. She expressed desire to start seeing a therapist to address her grief and loss feelings, she is also open to referral for out outpatient med management provider.       The following areas have been assessed:    History of Mental Health and Chemical Dependency:  Mental Health History:  Patient has a historical diagnosis of nxiety Disorder, Bipolar Disorder, Depression, Schizophrenia, Substance Use Disorder, Suicide attempt(s), PTSD . The patient has a history of suicide attempts reporting their last attempt was in 2021 via ingesting large dose of insuline. Patient  has a history of engaged in non-suicidal self-injury (NSSI). She has engaged in mental health services but does not have current therapy or psychiatry providers in place.     Previous psychiatric hospitalizations  and treatments:   Hx of previous inpatient admissions for mental health, last being at [facility] in 2019.     Substance Use History  Hx of chemical dependency, currently in remission for 15+ years.       Patient's current relationship status is   single.   Patient reported having two adult child(lsiandro).       Family Description (Constellation, significant information and events, Family Psychiatric History):  Grew up with both parents and 4 sisters. 1 sister  who also had schizoaffective D/O . Pt has 2 adult sons. There are two family members with CD issues.      Significant Medical issues, Life events or Trauma history:   Lost her mother and husbands within a few months of each other in . She also lost her sister who  on pt s birthday in 2018.          Living Situation:  Patient's current living/housing situation is  Group home . They live with supportive staff and they report that housing is stable and they are able to return upon discharge.       Educational Background:    Patient's highest education level was high school graduate. Patient reports they are  able to understand written materials.     Occupational and Financial Status:     Patient is currently unemployed.  Patient reports  income is obtained through SSDI disability.  Patient does not identify finances as a current stressor. They are insured under [facility] Medicare. Restrictions (No/Yes): No    Occupational History: Unemployed, currently on disability     Legal Concerns (current or past history):       Current Concerns: Denies     Past History: Denies       Legal Status:  Voluntary    County: [place]       Service History: None    Ethnic/Cultural/Spiritual considerations:   The patient describes their cultural background as Black/, heterosexual, female.  Contextual influences on patient's health include acuity of illness, safety concerns, and level of functioning.   Patient identified their preferred language to be English.  Patient reported they do not need the assistance of an .  Spiritual considerations include: None     Social Functioning (organizations, interests, support system):   In their free time, patient reports they like to TV, crafts.      Patient identified  and Group home staff  as part of their support system.  Patient identified the quality of these relationships as stable and meaningful.       Current Treatment Providers are:  Primary Care Provider:  Name/Clinic: Albino Rainey MD      /CADI  Name/Clinic: Jeny Weir    Number:     Group Home:  [facility]Clinic: [facility]    Number: [phone]     Per group home staff, pt was referred to [facility] for therapy, and [facility] for psychiatry. She has an appointment with Stefanie on 9/22.         GOALS FOR HOSPITALIZATION:  What do patient want to accomplish during this hospitalization to make things better for the patient.?   Patient priorities:  The patient reported that what is most important to them is patient is  They identified feel emotionally stable as a goal of this hospitalization.    Social Service Assessment/Plan:  Patient view:   Patient reports it is important for the care team to know diabetic and can be agitated when redirected to healthy eating habits..  Upon discharge, they anticipate needing outpatient therapy and psychiatry set up for them.      Patient will have psychiatric assessment and medication management by the psychiatrist. Medications will be reviewed and adjusted per /MD/APRN CNP as indicated. The treatment team will continue to assess and stabilize the patient's mental health symptoms with the use of medications and therapeutic programming. Hospital staff will provide a safe environment and a therapeutic milieu. Staff will continue to assess patient as needed. Patient will participate in unit groups and activities. Patient will receive individual and group support on the unit.       CTC will do individual inpatient treatment planning and after care planning. CTC will discuss options for increasing community supports with the patient. CTC will coordinate with outpatient providers and will place referrals to ensure appropriate follow up care is in place.

## 2023-09-20 NOTE — PROGRESS NOTES
SPIRITUAL HEALTH SERVICES  SPIRITUAL ASSESSMENT Progress Note  [facility]     REFERRAL SOURCE: I did try to visit patient Nena per [facility]  referral. However, During my visit attempts, pt was so sleepy and busy. Due to that reason, I didn't get a chance to visit the pt today.    PLAN: I will visit the pt tomorrow again.     Kun Israel M.Div. (Alem), M.Th., D.Min., [facility]  Staff   Pager [phone]  [place]

## 2023-09-20 NOTE — CONSULTS
[facility]  Internal Medicine Consult     Nena Tang MRN# [id number]   Age: 63 year old YOB: 1960     Date of Admission: 9/20/2023  Date of Consult: 9/20/2023    Primary Care Provider: Albino Rainey    Requesting Service: Behavioral Health - Josué Giang MD  Reason for Consult: General Medical Evaluation      SUBJECTIVE   CC:   Hallucinations   Assessment and Plan/Recommendations:     Nena Tang is a 63 year old woman with a history of CAD, HLD, HTN, CINDY, type 2 diabetes mellitus, IBS, obesity, migraines, polysubstance abuse, schizoaffective disorder, auditory hallucinations.  Admitted to 20 in for suicidal ideation.  Medicine consulted 9/20/2023 for medical comanagement.    #CAD  #Hyperlipidemia  #Hypertension  EKG normal sinus rhythm.  Denies chest pain, dyspnea on exertion, orthopnea, headache, blurry vision, lightheaded, dizziness.  Blood pressures have been elevated since admission.  We will continue to monitor before making any changes.  -Continue metoprolol 50 mg in a.m. and 100 mg at bedtime  -Continue losartan 100 mg daily  -Continue aspirin EC 81 mg daily  -Continue PTA atorvastatin    #Diabetes mellitus type 2 (Hb A1c 6.7 6/20/23)  Has had fluctuations in blood glucose control with recent steroid use.   -Continue PTA Lantus 33 units daily  -Medium resistance insulin sliding scale  -Hypoglycemia protocol  Recent Labs   Lab 09/20/23  1235 09/20/23  0706 09/20/23  0016 09/20/23  0009 09/19/23  2057 09/19/23  1821   * 157* 200* 178* 243* 243*       #CKD stage IIIa  Creatinine at baseline at 1.30.  Normal range 1.11-1.59.  -Avoid nephrotoxic medications as able    #CINDY  States she should wear a CPAP but does not have one. Denies daytime sleepiness snoring.   -Sleep study as outpatient    #Chronic pain  #Fibromyalgia  #Polymyalgia rheumatica  Patient with diffuse aches and pains with extensive work-up by primary care.  Treated with prednisone  for suspected PMR with significant improvement.  Inflammatory markers normalized.  -APAP scheduled  -Prednisone 5 mg 3 times daily    #Migraines  -Continue APAP scheduled    #IBS  #GERD  States she is having regular bowel movements.  Denies abdominal pain  -Continue PPI    #Polysubstance abuse  #Schizoaffective disorder  #Auditory hallucinations  #Suicidal ideation  -Management per psychiatry    Medicine will continue to follow along for hypertension.  Recommendations relayed to primary team via this progress note.  Thank you for the opportunity to be involved in this patient's care.        ART Mccarty CNP  Internal Medicine [facility] Hospitalist  Page job code [phone] (3B), [phone] (3A), or [phone] ([facility] and )  Text paging via [email or web address] is appreciated  September 20, 2023         HPI:   Nena Tang is a 63 year old woman with a history of CAD, HLD, HTN, CINDY, type 2 diabetes mellitus, IBS, obesity, migraines, polysubstance abuse, schizoaffective disorder, auditory hallucinations.  Admitted to 20 in for suicidal ideation.  Medicine consulted 9/20/2023 for medical comanagement.  Patient with a complex medical history.  She is followed very closely at her primary care clinic with several visits in the last 3 months.  She is recently been worked up for polymyalgia rheumatica and and has been on a taper of steroids with almost complete resolution of pain.  She is little bit concerned about her blood pressures being elevated.  Assured her we will continue to monitor closely and consider making blood pressure medication changes accordingly.  Patient states her hallucinations are decreasing in frequency.Denies shortness of breath, chest pain, dyspnea, lightheadedness, dizziness, numbness, tingling. Denies n/v/d, constipation, abdominal pain. Denies joint pain, swelling, increased warmth.          Past Medical History:     Past Medical History:   Diagnosis Date    Acute respiratory failure with hypoxia (H) 9/4/2017     CAD (coronary artery disease)     5/2014 cath, nonbostructive stenosis to LAD, RCA.    Chronic low back pain 1/22/2013    Cocaine abuse, in remission (H)     Fecal urgency 3/8/2012    History of heroin abuse (H)     Hyperlipidemia LDL goal <100 10/31/2010    Hypertension 7/29/2013    Illiterate 8/30/2011    Irritable bowel syndrome     Left cataract     Migraine 4/19/2012    Migraine headache 4/22/2013    Moderate major depression (H) 6/8/2011    Noncompliance with medication regimen 6/8/2011    Obesity     CINDY (obstructive sleep apnea) 3/8/2012    uses CPAP    CINDY (obstructive sleep apnea)- mild AHI 10.3     Osteopenia 10/7/2009    Pneumonia of right lower lobe due to infectious organism 9/4/2017    Schizoaffective disorder, depressive type (H) 2/25/2013    Sepsis (H) 8/29/2017    Suicidal intent 10/2/2013    Takotsubo cardiomyopathy     Type 2 diabetes mellitus (H) 8/30/2011    Uterine cancer (H) 1983    Verbal auditory hallucination 10/4/2012        Reviewed and updated in [facility].     Past Surgical History:      Past Surgical History:   Procedure Laterality Date    CATARACT IOL, RT/LT Bilateral 2017    CHOLECYSTECTOMY      COLONOSCOPY N/A 3/16/2017    Procedure: COLONOSCOPY;  Surgeon: Traci Gonzalez MD;  Location:  GI    Coronary CTA  5/21/2014    HYSTERECTOMY  1983    uterine cancer yearly pap's per provider.    HYSTERECTOMY      LAPAROSCOPIC CHOLECYSTECTOMY  2008    PHACOEMULSIFICATION CLEAR CORNEA WITH STANDARD INTRAOCULAR LENS IMPLANT Left 5/5/2017    Procedure: PHACOEMULSIFICATION CLEAR CORNEA WITH STANDARD INTRAOCULAR LENS IMPLANT;  LEFT EYE PHACOEMULSIFICATION CLEAR CORNEA WITH STANDARD INTRAOCULAR LENS IMPLANT ;  Surgeon: Tyra Diaz MD;  Location: [facility]    PHACOEMULSIFICATION CLEAR CORNEA WITH STANDARD INTRAOCULAR LENS IMPLANT Right 6/30/2017    Procedure: PHACOEMULSIFICATION CLEAR CORNEA WITH STANDARD INTRAOCULAR LENS IMPLANT;  RIGHT EYE PHACOEMULSIFICATION CLEAR CORNEA WITH STANDARD  INTRAOCULAR LENS IMPLANT;  Surgeon: Tyra Diaz MD;  Location:  EC    RELEASE TRIGGER FINGER  10/11/2012    Left thumb. Procedure: RELEASE TRIGGER FINGER;  LEFT THUMB TRIGGER RELEASE;  Surgeon: Tay Langley MD;  Location:  SD    RELEASE TRIGGER FINGER Right 2016    Procedure: RELEASE TRIGGER FINGER;  Surgeon: Albino Castañeda MD;  Location:  OR    [facility] OOPHORECTORMY FOR MALIG, W/BX  1983    UTERINE         Social History:     Social History     Tobacco Use    Smoking status: Never    Smokeless tobacco: Never   Vaping Use    Vaping Use: Never used   Substance Use Topics    Alcohol use: Not Currently     Comment: when younger    Drug use: No     Comment: history of        Family History:     Family History   Problem Relation Age of Onset    Cancer Mother         BLADDER    Respiratory Mother         COPD    Gastrointestinal Disease Mother         CIRRHOSIS OF LI BOLIVAR    Alcohol/Drug Mother     Diabetes Mother     Hypertension Mother     Lipids Mother     C.A.D. Mother     Glaucoma Mother     Alcohol/Drug Sister     Mental Illness Sister     Alcohol/Drug Sister     Psychotic Disorder Sister     Cancer Maternal Grandmother         UNKNOWN TYPE    Cancer Brother         COLON    Cancer - colorectal Brother         IN HIS LATE 30S    Alcohol/Drug Brother          OF HEROIN OVERDOSE AT AGE 22 YRS    Macular Degeneration No family hx of          Allergies:     Allergies   Allergen Reactions    Imidazole Antifungals Hives     Tolerates diflucan    Ketoprofen Itching     Pruritis to topical    Lisinopril Hives    Metformin Other (See Comments)     Patient hospitalized for lactic acidosis - admitting provider suspectd caused by metformin    Metronidazole Hives    Posaconazole Hives     Tolerates diflucan         Medications:   Reviewed. Please see MAR     Review of Systems:   10 point ROS of systems including Constitutional, Eyes, Respiratory, Cardiovascular, Gastroenterology, Genitourinary,  Integumentary, Muscularskeletal, Psychiatric were all negative except for pertinent positives noted in my HPI.    OBJECTIVE   Physical Exam:   Vitals were reviewed  Blood pressure (!) 185/90, pulse 74, temperature 98.1  F (36.7  C), temperature source Oral, height 1.524 m (5'), weight 108.5 kg (239 lb 3.2 oz), last menstrual period 2015, SpO2 95 %, not currently breastfeeding.  General: Alert and oriented x3, well appearing, flat  HEENT: Anicteric sclera, MMM  Cardiovascular: RRR, S1S2. No murmur noted  Lungs: CTAB without wheezing or crackles   GI: Abdomen soft, non-tender with bowel sounds present. No guarding or rebound   Vascular: Mild nonpitting peripheral edema, distal pulses palpable  Neurologic: No focal deficits, CN II-XII grossly intact  Skin: No jaundice, rashes, or lesions        Data:        Lab Results   Component Value Date     2023     06/15/2021    Lab Results   Component Value Date    CHLORIDE 101 2023    CHLORIDE 110 2022    CHLORIDE 112 06/15/2021    Lab Results   Component Value Date    BUN 32.0 2023    BUN 23 2022    BUN 6 06/15/2021      Lab Results   Component Value Date    POTASSIUM 4.9 2023    POTASSIUM 4.7 2022    POTASSIUM 3.4 06/15/2021    Lab Results   Component Value Date    CO2 22 2023    CO2 23 2022    CO2 20 06/15/2021    Lab Results   Component Value Date    CR 1.30 2023    CR 0.78 06/15/2021        Lab Results   Component Value Date    WBC 8.3 2023    HGB 10.0 (L) 2023    HCT 31.2 (L) 2023     (H) 2023     2023     Lab Results   Component Value Date    WBC 8.3 2023           Medical Decision Makin MINUTES SPENT BY ME on the date of service doing chart review, history, exam, documentation & further activities per the note.

## 2023-09-20 NOTE — PHARMACY-ADMISSION MEDICATION HISTORY
Pharmacist Admission Medication History    Admission medication history is complete. The information provided in this note is only as accurate as the sources available at the time of the update.    Medication reconciliation/reorder completed by provider prior to medication history? Yes    Information Source(s): Caregiver, Facility (U/NH/) medication list/MAR, and CareEverywhere/SureScripts via phone    Pertinent Information:   Spoke with member from [facility] [phone] who faxed over a medication list.   Escitalopram - per medication list, states to take 20 mg once daily. Based on fill history, indicates patient should be taking a half tablet once daily. Spoke with [facility] who confirmed this. Called and spoke with member from [facility] that state they have been giving her 20 mg daily. Changed on medication list to reflect how patient was taking prior to admission.   Clonazepam - per medication list, states to take 0.5 mg at bedtime.      Changes made to PTA medication list:  Added: None  Deleted: Estradiol - not listed on patient's medication list; prazosin - patient not taking at group home  Changed: Escitalopram 10 mg daily TO 20 mg daily; hydroxyzine 25 mg every 4 hours as needed for anxiety TO 25 mg at bedtime; clonazepam 0.5 mg 1 tablet every night as needed for anxiety TO 1 tablet at bedtime    Medication Affordability: N/A     Allergies reviewed with patient and updates made in EHR: yes    Medication History Completed By: Domo Aguilar RPH 9/20/2023 4:39 PM    Prior to Admission medications    Medication Sig Last Dose Taking?   acetaminophen (TYLENOL) 500 MG tablet Take 1000mg in the morning and 1000g at night. Take 1 additional dose of 500-1000mg mid-day as needed Past Week at Unknown time Yes   alendronate (FOSAMAX) 70 MG tablet Take 1 tablet (70 mg) by mouth every 7 days Wednesdays - Take with water, 30 minutes before breakfast. Do not take with any other medicines. Sit  upright for 30 minutes after taking.  Yes   amantadine (SYMMETREL) 100 MG capsule TAKE 1 CAPSULE BY MOUTH EVERY MORNING AND TAKE 2 CAPSULES BY MOUTH EVERY EVENING Past Week at AM Yes   aspirin (ASPIRIN LOW DOSE) 81 MG EC tablet Take 1 tablet (81 mg) by mouth daily Past Week at AM Yes   atorvastatin (LIPITOR) 80 MG tablet TAKE 1 TABLET BY MOUTH DAILY Past Week at PM Yes   calcium carbonate (OS-ALLEN) 1500 (600 Ca) MG tablet TAKE 1 TABLET BY MOUTH DAILY Past Week at AM Yes   clonazePAM (KLONOPIN) 0.5 MG tablet TAKE 1 TABLET BY MOUTH EVERY NIGHT AS NEEDED FOR ANXIETY  Patient taking differently: Take 0.5 mg by mouth At Bedtime Past Week at PM Yes   diclofenac (VOLTAREN) 1 % topical gel Apply 4 grams to painful area at bedtime. May use an additional 3 doses during the day as needed Past Week at Unknown time Yes   escitalopram (LEXAPRO) 20 MG tablet TAKE 1/2 TABLET BY MOUTH DAILY  Patient taking differently: Take 20 mg by mouth daily Past Week at AM Yes   gabapentin (NEURONTIN) 300 MG capsule Take 1 capsule (300 mg) by mouth At Bedtime Past Week at PM Yes   hydrOXYzine (VISTARIL) 25 MG capsule Take 1 capsule (25 mg) by mouth every 4 hours as needed for anxiety May take nightly for sleep  Patient taking differently: Take 25 mg by mouth At Bedtime May take nightly for sleep Past Week at PM Yes   insulin glargine (LANTUS PEN) 100 UNIT/ML pen Inject 33 Units Subcutaneous 2 times daily Past Week at Unknown time Yes   insulin lispro (HUMALOG KWIKPEN) 100 UNIT/ML (1 unit dial) KWIKPEN TAKE 8 UNITS THREE TIMES A DAY WITH MEALS PLUS SLIDING SCALE . ADD 3 UNITS FOR EVERY 50MG/DL OVER 150 WITH MAX Past Week at Unknown time Yes   losartan (COZAAR) 100 MG tablet TAKE 1 TABLET BY MOUTH DAILY Past Week at AM Yes   magnesium 250 MG tablet Take 1 tablet (250 mg) by mouth At Bedtime Past Week at PM Yes   metoprolol succinate ER (TOPROL XL) 50 MG 24 hr tablet TAKE 1 TABLET BY MOUTH IN THE MORNING AND TAKE 2 TABLETS AT BEDTIME Past Week at AM  Yes   mirabegron (MYRBETRIQ) 50 MG 24 hr tablet Take 1 tablet (50 mg) by mouth daily Past Week at AM Yes   nystatin (MYCOSTATIN) 930429 UNIT/GM external powder Apply topically 2 times daily as needed PRN at PRN Yes   ondansetron (ZOFRAN) 4 MG tablet Take 1 tablet (4 mg) by mouth every 8 hours as needed for nausea PRN at PRN Yes   paliperidone ER (INVEGA) 9 MG 24 hr tablet Take 1 tablet (9 mg) by mouth At Bedtime Past Week at PM Yes   pantoprazole (PROTONIX) 40 MG EC tablet TAKE 1 TABLET (40 MG) BY MOUTH DAILY Past Week at AM Yes   predniSONE (DELTASONE) 5 MG tablet TAKE 3 TABLETS BY MOUTH DAILY Past Week at AM Yes   QUEtiapine (SEROQUEL) 100 MG tablet Take 100 mg by mouth At Bedtime Past Week at PM Yes   senna-docusate (SENOKOT-S/PERICOLACE) 8.6-50 MG tablet Take 1 tablet daily and take an extra tablet with constipation.  Patient taking differently: Take 1 tablet daily and take an extra tablet as needed with constipation. Past Week at PM Yes   traZODone (DESYREL) 100 MG tablet Take 1 tablet (100 mg) by mouth At Bedtime Past Week at PM Yes   TRULICITY 3 MG/0.5ML SOPN INJECT 3MG SUBCUTANEOUSLY EVERY 7 DAYS 9/12/2023 at 1500 Yes   vitamin C (ASCORBIC ACID) 500 MG tablet TAKE 2 TABLETS BY MOUTH IN THE MORNING AND TAKE 2 TABLETS BY MOUTH IN THE EVENING Past Week at AM Yes   zinc oxide (DESITIN) 20 % external ointment Apply topically 3 times daily as needed for irritation (barrier cream) PRN at PRN Yes

## 2023-09-20 NOTE — PROGRESS NOTES
Call received from [facility], required recheck labs were obtained, glucose check 178. Pt agrees to go Inpt, and is fine with having a roommate.

## 2023-09-20 NOTE — TELEPHONE ENCOUNTER
Intake has called facilities that have not updated their bed status within the last 12 hours.??      ADULTS:     *[place]  [place] -- [facility]: @ cap per website.  [place] -- [facility]:  @ cap per website.  [place] -- [facility]: @ cap per website.  [place] -- [facility]: @ cap per website.   [place] -- [facility]: @ cap per website.  [facility] -- [facility]: @ cap per website.  [facility] [place] -- [facility] - OP[facility] OCT. 2023  [place] -- [facility]: @ cap per website.  [place] -- [facility]: @ cap per website.  [place] -- [facility]:  @ cap per website.  [facility]: @ cap per website. Low acuity only       Pt remains on waitlist pending appropriate placement availability.    Resident is still reviewing.

## 2023-09-21 LAB
CHOLEST SERPL-MCNC: 176 MG/DL
FOLATE SERPL-MCNC: 7.9 NG/ML (ref 4.6–34.8)
GLUCOSE BLDC GLUCOMTR-MCNC: 141 MG/DL (ref 70–99)
GLUCOSE BLDC GLUCOMTR-MCNC: 168 MG/DL (ref 70–99)
GLUCOSE BLDC GLUCOMTR-MCNC: 267 MG/DL (ref 70–99)
GLUCOSE BLDC GLUCOMTR-MCNC: 289 MG/DL (ref 70–99)
HBA1C MFR BLD: 7 %
HDLC SERPL-MCNC: 68 MG/DL
LDLC SERPL CALC-MCNC: 81 MG/DL
NONHDLC SERPL-MCNC: 108 MG/DL
TRIGL SERPL-MCNC: 136 MG/DL
TSH SERPL DL<=0.005 MIU/L-ACNC: 2.86 UIU/ML (ref 0.3–4.2)
VIT B12 SERPL-MCNC: 460 PG/ML (ref 232–1245)

## 2023-09-21 PROCEDURE — 80061 LIPID PANEL: CPT

## 2023-09-21 PROCEDURE — 99223 1ST HOSP IP/OBS HIGH 75: CPT | Performed by: STUDENT IN AN ORGANIZED HEALTH CARE EDUCATION/TRAINING PROGRAM

## 2023-09-21 PROCEDURE — 99223 1ST HOSP IP/OBS HIGH 75: CPT

## 2023-09-21 PROCEDURE — 99232 SBSQ HOSP IP/OBS MODERATE 35: CPT | Mod: GC | Performed by: PSYCHIATRY & NEUROLOGY

## 2023-09-21 PROCEDURE — 82607 VITAMIN B-12: CPT

## 2023-09-21 PROCEDURE — 83036 HEMOGLOBIN GLYCOSYLATED A1C: CPT

## 2023-09-21 PROCEDURE — 250N000013 HC RX MED GY IP 250 OP 250 PS 637

## 2023-09-21 PROCEDURE — 250N000012 HC RX MED GY IP 250 OP 636 PS 637

## 2023-09-21 PROCEDURE — 36415 COLL VENOUS BLD VENIPUNCTURE: CPT

## 2023-09-21 PROCEDURE — 84443 ASSAY THYROID STIM HORMONE: CPT

## 2023-09-21 PROCEDURE — 99418 PROLNG IP/OBS E/M EA 15 MIN: CPT

## 2023-09-21 PROCEDURE — G0177 OPPS/PHP; TRAIN & EDUC SERV: HCPCS

## 2023-09-21 PROCEDURE — 82746 ASSAY OF FOLIC ACID SERUM: CPT

## 2023-09-21 PROCEDURE — 124N000002 HC R&B MH UMMC

## 2023-09-21 RX ORDER — ESCITALOPRAM OXALATE 10 MG/1
20 TABLET ORAL DAILY
Status: DISCONTINUED | OUTPATIENT
Start: 2023-09-22 | End: 2023-09-27 | Stop reason: HOSPADM

## 2023-09-21 RX ORDER — PREDNISONE 5 MG/1
10 TABLET ORAL DAILY
Status: DISCONTINUED | OUTPATIENT
Start: 2023-09-22 | End: 2023-09-22

## 2023-09-21 RX ORDER — PREDNISONE 5 MG/1
5 TABLET ORAL 3 TIMES DAILY
Status: COMPLETED | OUTPATIENT
Start: 2023-09-21 | End: 2023-09-21

## 2023-09-21 RX ORDER — SUMATRIPTAN 25 MG/1
25 TABLET, FILM COATED ORAL ONCE
Status: COMPLETED | OUTPATIENT
Start: 2023-09-21 | End: 2023-09-21

## 2023-09-21 RX ADMIN — MIRABEGRON 50 MG: 25 TABLET, FILM COATED, EXTENDED RELEASE ORAL at 08:42

## 2023-09-21 RX ADMIN — DOCUSATE SODIUM 100 MG: 100 CAPSULE, LIQUID FILLED ORAL at 08:41

## 2023-09-21 RX ADMIN — PREDNISONE 5 MG: 5 TABLET ORAL at 13:24

## 2023-09-21 RX ADMIN — DOCUSATE SODIUM 100 MG: 100 CAPSULE, LIQUID FILLED ORAL at 19:58

## 2023-09-21 RX ADMIN — ASPIRIN 81 MG: 81 TABLET, COATED ORAL at 08:41

## 2023-09-21 RX ADMIN — PALIPERIDONE 12 MG: 3 TABLET, EXTENDED RELEASE ORAL at 22:50

## 2023-09-21 RX ADMIN — SENNOSIDES AND DOCUSATE SODIUM 1 TABLET: 50; 8.6 TABLET ORAL at 22:02

## 2023-09-21 RX ADMIN — ESCITALOPRAM OXALATE 15 MG: 5 TABLET, FILM COATED ORAL at 08:40

## 2023-09-21 RX ADMIN — SUMATRIPTAN SUCCINATE 25 MG: 25 TABLET ORAL at 16:00

## 2023-09-21 RX ADMIN — PREDNISONE 5 MG: 5 TABLET ORAL at 08:41

## 2023-09-21 RX ADMIN — TRAZODONE HYDROCHLORIDE 100 MG: 100 TABLET ORAL at 21:05

## 2023-09-21 RX ADMIN — Medication 250 MG: at 22:03

## 2023-09-21 RX ADMIN — ATORVASTATIN CALCIUM 80 MG: 80 TABLET, FILM COATED ORAL at 19:58

## 2023-09-21 RX ADMIN — METOPROLOL SUCCINATE 50 MG: 25 TABLET, FILM COATED, EXTENDED RELEASE ORAL at 08:41

## 2023-09-21 RX ADMIN — QUETIAPINE FUMARATE 100 MG: 100 TABLET ORAL at 21:05

## 2023-09-21 RX ADMIN — INSULIN GLARGINE 33 UNITS: 100 INJECTION, SOLUTION SUBCUTANEOUS at 08:59

## 2023-09-21 RX ADMIN — GABAPENTIN 300 MG: 300 CAPSULE ORAL at 21:04

## 2023-09-21 RX ADMIN — LOSARTAN POTASSIUM 100 MG: 25 TABLET, FILM COATED ORAL at 08:42

## 2023-09-21 RX ADMIN — ACETAMINOPHEN 1000 MG: 500 TABLET, FILM COATED ORAL at 08:40

## 2023-09-21 RX ADMIN — INSULIN ASPART 3 UNITS: 100 INJECTION, SOLUTION INTRAVENOUS; SUBCUTANEOUS at 21:52

## 2023-09-21 RX ADMIN — Medication 600 MG: at 08:41

## 2023-09-21 RX ADMIN — ACETAMINOPHEN 1000 MG: 500 TABLET, FILM COATED ORAL at 19:57

## 2023-09-21 RX ADMIN — AMANTADINE HYDROCHLORIDE 100 MG: 100 CAPSULE ORAL at 08:41

## 2023-09-21 RX ADMIN — METOPROLOL SUCCINATE 100 MG: 25 TABLET, FILM COATED, EXTENDED RELEASE ORAL at 21:03

## 2023-09-21 RX ADMIN — PANTOPRAZOLE SODIUM 40 MG: 40 TABLET, DELAYED RELEASE ORAL at 08:40

## 2023-09-21 RX ADMIN — CYPROHEPTADINE HYDROCHLORIDE 4 MG: 4 TABLET ORAL at 21:06

## 2023-09-21 RX ADMIN — PREDNISONE 5 MG: 5 TABLET ORAL at 19:58

## 2023-09-21 ASSESSMENT — ACTIVITIES OF DAILY LIVING (ADL)
ADLS_ACUITY_SCORE: 43
ADLS_ACUITY_SCORE: 43
HYGIENE/GROOMING: HANDWASHING;INDEPENDENT
ADLS_ACUITY_SCORE: 42
ORAL_HYGIENE: INDEPENDENT
ADLS_ACUITY_SCORE: 43
ADLS_ACUITY_SCORE: 42
ADLS_ACUITY_SCORE: 42
LAUNDRY: UNABLE TO COMPLETE
ADLS_ACUITY_SCORE: 43
ADLS_ACUITY_SCORE: 43
ADLS_ACUITY_SCORE: 42
ADLS_ACUITY_SCORE: 43
DRESS: INDEPENDENT;SCRUBS (BEHAVIORAL HEALTH)

## 2023-09-21 NOTE — CARE PLAN
BEH Occupational Therapy Group Intervention Note     09/21/23 1442   Group Therapy Session   Group Attendance attended group session   Total Time (minutes) 90   Group Type task skill;psychoeducation;life skill   Group Topic Covered coping skills/lifestyle management;relapse prevention;cognitive activities;emotions/expression   Group Session Detail 1) Clinic - coping skill exploration, creative expression within personally meaningful activities, and observation of social, cognitive, and kinesthetic performance skills    2) Journaling group for coping skill exploration and processing emotions to support healthy recovery. Education was provided on various uses of a journal, journaling tools, and exploration of personal reasons to journal.    Patient Response/Contribution cooperative with task;discussed personal experience with topic;listened actively;organized   Patient Participation Detail 1) Restricted affect. Polite and pleasant upon approach. Spent time creating a healthy food collage to motivate better nutrition. Received min A to gather materials and organize work space. Congruent with yesterday's performance, Pt demonstrated ability to sequence task and reach task completion this date. Demonstrated fair social engagement upon approach.    2) Participated throughout education and created personal contract to practice 1 journaling tool in the next week. Following education, identified various journal topics to explore in the future. Expressed intention to follow through within the hospital and after discharge.      Tyra Edmonds OT on 9/21/2023 at 2:42 PM

## 2023-09-21 NOTE — PLAN OF CARE
"  Problem: Adult Behavioral Health Plan of Care  Goal: Plan of Care Review  Outcome: Progressing     Problem: Psychotic Signs/Symptoms  Goal: Improved Psychomotor Symptoms (Psychotic Signs/Symptoms)  Intervention: Manage Psychomotor Movement  Recent Flowsheet Documentation  Taken 9/21/2023 1610 by Belen Arvizu RN  Patient Performed Hygiene:   dressed   undressed  Activity (Behavioral Health):   activity encouraged   up ad lito   Goal Outcome Evaluation:                    Pt was visible in the milieu  watching TV and listening to music all evening. Isolative and withdrawn from  her peers. Affect flat but brightened on assessment. State,\"my day has been good\". Reported headache. Metrix given as ordered. Denies SI, SIB, Visual or auditory hallucination, HI. Endorsed anxiety 7/10, depression 7/10. Stated, \"l have been anxious about everything\". Contracted for safety. Pt is on SI and fall precautions.  Adequate Po intake. No behavior concerns noted. No safety concerns noted. Vitals stable.  and 267. Received coverage.    "

## 2023-09-21 NOTE — CONSULTS
"[facility] Rheumatology  Date of Service: 9/21/2023     Consulting provider: Dr. Rebecca Kaufman  Referral for:  suspected PMR, wondering about prednisone discontinuation in setting of psychosis and hyperglycemia    CC: steroid side effects, pain    HPI: 63 year old with history of coronary artery disease, takotsuba cardiomyopathy, type 2 diabetes with complicating retinopathy, uterine cancer, osteopenia, schizoaffective disorder and depression currently admitted with suicidal ideation. She has been treated with steroids for presumed PMR since May 2023 are contributing to her current mental state.   She was seen in march 2019 by Dr. العلي for steroid responsive polyarthralgia. She was found to be positive for AIDA 1:160 in centromeric pattern. Remainder of VIJAY and complements were normal/unremarkable and centromere antibody was positive. Per notes, she tolerated leflunomide well and was responsive to the medication. When Dr. العلي left [facility], she transferred her care to Dr. Singleton who felt that she did not have inflammatory polyarthralgia but instead had either rotator cuff tendinopathy v. Frozen shoulder and fibromyalgia. Dr. Singleton stopped leflunomide and obtained MRIs which showed multifocal tendinopathy, some partial thickness tears, but only a small amount of bursal fluid in subacromial bursa on right side. He did bilateral cortisone injections into shoulders and at her next visit with NP Nan batres reported improvement in shoulder pain for ~2.5 months.    In March 2023 she was seen in the ED after a 'fall' at which time her legs gave out as she tried to walk outside with 3 house staff members. In May she returned to the ED and was noted to have had frequent falls in the interim. In each case her legs became weak and at times she could not get herself back up again. At the time of ED visit she had \"fallen\" twice in that last week. She returned to the ED again later that month, noted to have " had a recent UTI (citrobacter) and presenting with right hip pain which was worked up for pyelonephritis. She saw her primary care shortly thereafter who checked a CK which returned normal. An MTM note shortly after that notes that Nena's hips and knees were hurting her with standing and walking and she had no pain with sitting. Her right low back also hurt. At the very end of May, her primary care became concerned that her symptoms were due to PMR due to elevated inflammatory markers. Her CRP at the time was almost 100 and ESR was 73. She returned to the ED in early June with right flank pain, was evaluated for kidney stone again, CT identified a non-obstructing stone in right kidney and no hydronephrosis. When she followed up with her PCP, he started her on 20 mg prednisone thinking that her symptoms of weakness and/or myalgia were due to PMR. On 6/8/23 it seems she had another incident where her legs gave out again and I believe she had started prednisone at this point. At her followup 6/20/23 her PCP noted conflicting improvement with perhaps mild improvement in pain in her legs but not improvement in weakness and then decided to extend the 20 mg prednisone daily course. She continued at this dose until JUly 11, 2023 when during a followup her PCP noted improvement in her aches and pains and reduced her prednisone dose to 15 mg daily. On 7/25 Dr. Rainey decided to continue 15 mg prednisone daily. In Aug 2023 she returned to the ED for right flank pain and was noted to have had a covid infection. On august 12, 2023 Dr. Rainey again refilled 15 mg prednisone. On 9/18 she returned to the ED for evaluation for suicidal ideation coincident with the anniversary of her mother's passing. She was then transferred to the [place] [facility] unit.     Today Nena notes that she feels she has had many side effects consistent with prednisone side effects. She notes altered mood, poor sleep, weight gain, a  bloating sensation. She continues to have tenderness and pain at multiple areas particularly her lateral hips, her shoulders, and the base of her neck. She would like to come off the prednisone. She notes that she has hip pain and knee pain with walking. She cannot sleep on her sides, particularly her right sides because her right hip and right shoulder hurt.     ROS: 10 point ROS neg other than the symptoms noted above in the HPI.   ALLERGIES: Imidazole antifungals, Ketoprofen, Lisinopril, Metformin, Metronidazole, and Posaconazole   MEDS: personally reviewed, notable for She has, essentially been on 15-20 mg prednisone daily since 6/20/23.    PRIOR RHEUM MEDS: leflunomide    PMHx:  Past Medical History:   Diagnosis Date     Acute respiratory failure with hypoxia (H) 9/4/2017     CAD (coronary artery disease)     5/2014 cath, nonbostructive stenosis to LAD, RCA.     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/2011     Noncompliance with medication regimen 6/8/2011     Obesity      CINDY (obstructive sleep apnea) 3/8/2012    uses CPAP     CINDY (obstructive sleep apnea)- mild AHI 10.3      Osteopenia 10/7/2009     Pneumonia of right lower lobe due to infectious organism 9/4/2017     Schizoaffective disorder, depressive type (H) 2/25/2013     Sepsis (H) 8/29/2017     Suicidal intent 10/2/2013     Takotsubo cardiomyopathy      Type 2 diabetes mellitus (H) 8/30/2011     Uterine cancer (H) 1983     Verbal auditory hallucination 10/4/2012        PSHx:  Past Surgical History:   Procedure Laterality Date     CATARACT IOL, RT/LT Bilateral 2017     CHOLECYSTECTOMY       COLONOSCOPY N/A 3/16/2017    Procedure:  COLONOSCOPY;  Surgeon: Traci Gonzalez MD;  Location:  GI     Coronary CTA  5/21/2014     HYSTERECTOMY  1983    uterine cancer yearly pap's per provider.     HYSTERECTOMY       LAPAROSCOPIC CHOLECYSTECTOMY  2008     PHACOEMULSIFICATION CLEAR CORNEA WITH STANDARD INTRAOCULAR LENS IMPLANT Left 5/5/2017    Procedure: PHACOEMULSIFICATION CLEAR CORNEA WITH STANDARD INTRAOCULAR LENS IMPLANT;  LEFT EYE PHACOEMULSIFICATION CLEAR CORNEA WITH STANDARD INTRAOCULAR LENS IMPLANT ;  Surgeon: Tyra Diaz MD;  Location:  EC     PHACOEMULSIFICATION CLEAR CORNEA WITH STANDARD INTRAOCULAR LENS IMPLANT Right 6/30/2017    Procedure: PHACOEMULSIFICATION CLEAR CORNEA WITH STANDARD INTRAOCULAR LENS IMPLANT;  RIGHT EYE PHACOEMULSIFICATION CLEAR CORNEA WITH STANDARD INTRAOCULAR LENS IMPLANT;  Surgeon: Tyra Diaz MD;  Location:  EC     RELEASE TRIGGER FINGER  10/11/2012    Left thumb. Procedure: RELEASE TRIGGER FINGER;  LEFT THUMB TRIGGER RELEASE;  Surgeon: Tay Langley MD;  Location:  SD     RELEASE TRIGGER FINGER Right 12/26/2016    Procedure: RELEASE TRIGGER FINGER;  Surgeon: Albino Castañeda MD;  Location:  OR     [facility] OOPHORECTORMY FOR MALIG, W/BX  1983    UTERINE       SocHx:  Social History     Tobacco Use     Smoking status: Never     Smokeless tobacco: Never   Vaping Use     Vaping Use: Never used   Substance Use Topics     Alcohol use: Not Currently     Comment: when younger     Drug use: No     Comment: history of        FamHx:  family history includes Alcohol/Drug in her brother, mother, sister, and sister; C.A.D. in her mother; Cancer in her brother, maternal grandmother, and mother; Cancer - colorectal in her brother; Diabetes in her mother; Gastrointestinal Disease in her mother; Glaucoma in her mother; Hypertension in her mother; Lipids in her mother; Mental Illness in her sister; Psychotic Disorder in her sister; Respiratory in her mother.     PHYSICAL EXAM  Vitals: BP (!) 155/86    Pulse 75   Temp 97.5  F (36.4  C) (Temporal)   Resp 20   Ht 1.524 m (5')   Wt 110.5 kg (243 lb 9.6 oz)   LMP 01/06/2015 (Exact Date)   SpO2 96%   BMI 47.57 kg/m    BMI= Body mass index is 47.57 kg/m .     General: awake, alert, oriented, pleasant  HEENT: EOMI, sclera without icterus or injection, moonfacies  Resp: comfortably breathing on room air  CV: warm, well perfused extremities  Neuro: easily able to get up from chair  MSK: tender to palpation over bilateral trochanteric bursas, shoulder ROM restricted to 90 degrees bilaterally, internal rotation and external rotation intact    LAB REVIEW:      Latest Ref Rng & Units 7/11/2023    10:57 AM 8/17/2023    12:49 AM 9/20/2023    12:16 AM   RHEUM RESULTS   Albumin 3.5 - 5.2 g/dL  3.9  3.9    ALT 0 - 50 U/L  31  26    AST 0 - 45 U/L  13  13    Complement C3 81 - 157 mg/dL 161      Complement C4 13 - 39 mg/dL 42      CK Total 26 - 192 U/L 60      Creatinine 0.51 - 0.95 mg/dL 1.43  1.13  1.30    DNA-ds <10.0 IU/mL <0.6      GFR Estimate >60 mL/min/1.73m2 41  55  46    Hematocrit 35.0 - 47.0 % 32.9  30.5  31.2    Hemoglobin 11.7 - 15.7 g/dL 10.3  9.8  10.0    WBC 4.0 - 11.0 10e3/uL 6.1  8.9  8.3    RBC Count 3.80 - 5.20 10e6/uL 3.14  2.94  3.04    RDW 10.0 - 15.0 % 13.2  12.8  13.1    MCHC 31.5 - 36.5 g/dL 31.3  32.1  32.1    MCV 78 - 100 fL 105  104  103    Platelet Count 150 - 450 10e3/uL 174  185  166        Lab Results   Component Value Date/Time    SUMAYA Positive (A) 06/20/2023 10:46 AM    SUMAYA Positive (A) 01/18/2019 10:27 AM    SUMAYA Positive (A) 12/20/2018 03:32 PM    ANAP1 Centromere 06/20/2023 10:46 AM    ANAP1 CENTROMERE 01/18/2019 10:27 AM    ANAP1 CENTROMERE 12/20/2018 03:32 PM    ANAT1 1:320 06/20/2023 10:46 AM    ANAT1 1:160 01/18/2019 10:27 AM    ANAT1 1:640 12/20/2018 03:32 PM    RHF 13 (H) 06/20/2023 10:46 AM    RHF <20 12/20/2018 03:32 PM    CCPIGG <0.4 06/20/2023 10:46 AM    CCPIGG <1 12/20/2018 03:32 PM    DNA <0.6 07/11/2023 10:57 AM    DNA <1  "03/14/2019 09:27 AM    RNPIGG Negative 07/11/2023 10:57 AM    RNPIGG <0.2 03/14/2019 09:27 AM    SMIGG Negative 07/11/2023 10:57 AM    SMIGG <0.2 03/14/2019 09:27 AM    SSAIGG <0.2 03/14/2019 09:27 AM    SSBIGG <0.2 03/14/2019 09:27 AM    SCLIGG Negative 07/11/2023 10:57 AM    SCLIGG <0.2 03/14/2019 09:27 AM        Lab Results   Component Value Date/Time    TBRES Negative 01/28/2022 05:12 PM        ASSESSMENT: 64 yo with +low RF, +centromere antibody (AIDA 1:640 centromeric) who has been on 15-20 mg prednisone since June for presumed PMR. While she does have tenderness over bursal sites, her recent symptomology seems mainly to be driven by weakness and subsequent \"falls\". I do not find the PMR diagnosis convincing and would like to assess her pain off steroids.    DISCUSSION: PMR presents as prominently pain >>> weakness. This is not the history patient provides. Metabolic myopathies can also be painful. Myositis, neurologic myopathies, certain central nervous system deficits often due to stroke, cauda equina syndrome, and more can cause lower extremity weakness >>> pain. At this time on brief exam she did not have obvious lower extremity weakness. However, many symptoms can be masked by prednisone use. Therefore, to better evaluate her symptoms, and to reduce the side effects of prednisone on her blood sugars and mood, we would like to discontinue prednisone. As she has been on prednisone for some time and adrenal insufficiency is a possibility, this is best done as a taper at this time. If her symptoms of either pain or weakness return, we can repeat evaluation.    I am also concerned that she has presented multiple times with right flank pain without an etiology identified. It is clear that she does not have an obstructing stone and I do not have a better explanation per se but this symptom seems unresolved. She also is anemic.    Finally, she has a +centromere antibody. This puts her at risk for development of " limited cutaneous systemic sclerosis (previously known as CREST). This requires formal evaluation of symptom expression including swallowing evaluation and echocardiogram to assess for pulmonary hypertension. Her recent CT without contrast (albeit not an HRCT) did not show evidence of ILD or patchoulis esophagus. She does not have raynaud's. It would likely be best to have her re-establish with rheumatology for followup long term.    DIAGNOSIS:  ## lower extremity weakness without known etiology  ## multifocal pain concerning for fibromyalgia  ## recently elevated inflammatory markers responsive to steroids  ## history of polyarthralgia responsive to steroids and leflunomide  ## osteopenia    PLAN:  1. Decrease prednisone as follows: reduce to 12.5 mg x 5 days, then 10 mg x 5 days, then 7.5 mg x 5 days, then 5 mg x 5 days, then 2.5 mg x 5 days  2. If symptoms recur or new symptoms occur while admitted, please alert our team and we will come back to re-evaluate.  3. If patient becomes safe for discharge, she will need a followup with rheumatology outpatient and we would be happy to see her  4. Please evaluate her for etiology for anemia    Please do not hesitate to contact me with questions or concerns.    Cherie Reza MD, PhD  Rheumatology

## 2023-09-21 NOTE — PROGRESS NOTES
Brief Medicine Note    Contacted by psychiatry for headache, vertigo.     Today's vital signs, medications, and nursing notes were reviewed         BP (!) 170/76   Pulse 73   Temp 97.4  F (36.3  C) (Oral)   Ht 1.524 m (5')   Wt 110.5 kg (243 lb 9.6 oz)   LMP 01/06/2015 (Exact Date)   SpO2 96%   BMI 47.57 kg/m    General: A&O. NAD.   Neuro: Dizzy, headache, photosensitivity    A/P:    Migraine  Pt states pain is behind both eyes. She feels lightheaded. Moderate photosensitivity. Normally uses APAP for migraines but this has not been effective today.   -Sumatriptan 25 mg today, repeat dose in 2 hours if not effective  -Rest in dark room     ART Mccarty CNP  Internal Medicine [facility] Hospitalist  Page job code [id number] (3B), [phone] (3A), or [phone] ([facility] and 4A)  Text paging via [employer] is appreciated  September 21, 2023

## 2023-09-21 NOTE — DISCHARGE INSTRUCTIONS
Behavioral Discharge Planning and Instructions    Summary: You were admitted on 9/20/2023  due to Depression and SI .  You were treated by Josué Giang MD and discharged on 9/27/2023 from [facility]    Main Diagnosis: Schizoaffective disorder, TAMIA, bipolar disorder, and PTSD         Health Care Follow-up:     Psychiatry Appointment: Friday September 29th at 11:30am  Provider: ART Hernandez, CNP, PMHNP-BC [facility] [address], [address] [place] [address]  Phone: [phone]  Fax: [phone]  Notes: Intake paperwork will be sent to your email. Please complete at least 24 hours to your appointment time.       Therapy Appointment: Please reach out to the clinic listed below to schedule.  [facility]  Phone: [phone]        Information will be faxed to your outpatient providers to ensure a healthy continuity of care for you.     Attend all scheduled appointments with your outpatient providers. Call at least 24 hours in advance if you need to reschedule an appointment to ensure continued access to your outpatient providers.     Major Treatments, Procedures and Findings:  You were provided with: a psychiatric assessment, assessed for medical stability, medication evaluation and/or management, group therapy, and milieu management    Symptoms to Report: feeling more aggressive, increased confusion, losing more sleep, mood getting worse, or thoughts of suicide    Early warning signs can include: increased depression or anxiety sleep disturbances increased thoughts or behaviors of suicide or self-harm     Safety and Wellness:  Take all medicines as directed.  Make no changes unless your doctor suggests them.  Follow treatment recommendations.  Refrain from alcohol and non-prescribed drugs.  Ask your support system to help you reduce your access to items that could harm yourself or others. Items could include:  Firearms  Medicines (both  "prescribed and over-the-counter)  Knives and other sharp objects  Ropes and like materials  Car keys  If there is a concern for safety, call 911. If there is a concern for safety, call 911.    Resources:   Crisis Intervention: 838.813.2292 or 371-992-7449 (TTY: 799.130.6616).  Call anytime for help.  [place] [employer] [place] [employer]ness (www.[place].marah.org): 590.693.2854 or 958-542-1325.  Suicide Awareness Voices of Education (SAVE) (www.save.org): 401-060-NQHQ (8838)  National Suicide Prevention Line (www.mentalhealthmn.org): 374-126-PSLL (0747)  Mental Health Consumer/Survivor Network of [place] (www.mhcsn.net): 599.686.7373 or 032-923-0617  Mental Health Association of [place] (www.mentalhealth.org): 657.615.7355 or 927-154-6567  [facility] Crisis (COPE) Response - Adult 360 933-4785  [facility] Crisis Response - Adult 115 066-7341  Text 4 Life: txt \"LIFE\" to 93307 for immediate support and crisis intervention  Crisis text line: Text \"[place]\" to 921677. Free, confidential, 24/7.       [facility] ([place] on Mental Illness) improves the lives of children and adults with mental illnesses and their families by providing free classes on mental illnesses and support groups for adults with mental illnesses, parents and family members. For more information: Phone: 895.669.4037 Toll free: 6-367-IUUF-HELPS Website: www.namihelps.orghttp://www.namihelps.org/      General Medication Instructions:   See your medication sheet(s) for instructions.   Take all medicines as directed.  Make no changes unless your doctor suggests them.   Go to all your doctor visits.  Be sure to have all your required lab tests. This way, your medicines can be refilled on time.  Do not use any drugs not prescribed by your doctor.  Avoid alcohol.    Advance Directives:   Scanned document on file with [facility]? No scanned doc  Is document scanned? Pt states no documents  Honoring Choices Your Rights Handout: Informed and given  Was more " information offered? Materials given    The Treatment team has appreciated the opportunity to work with you. If you have any questions or concerns about your recent admission, you can contact the unit which can receive your call 24 hours a day, 7 days a week. They will be able to get in touch with a Provider if needed. The unit number is [phone] .       IP Diabetes Management Team Discharge Instructions    Glucose Control Regimen:     While on Prednisone 10 mg:    Lantus 30 unit(s) in AM at 0800 20 unit(s) at 2000    Novolog 1 unit(s) per 5 g cho meals and snacks or return to fixed doses if that is preferred (12 units with meals of 65 - 75 grams chos) or 6 unit(s) for smaller meals/snacks of 30 grams chos    Novolog high resistance sliding scale insulin TID AC and HS    Correction Scale - HIGH INSULIN RESISTANCE DOSING      Do Not give Correction Insulin if Pre-Meal BG less than 140.    For Pre-Meal  - 164 give 1 unit.    For Pre-Meal  - 189 give 2 units.    For Pre-Meal  - 214 give 3 units.    For Pre-Meal  - 239 give 4 units.    For Pre-Meal  - 264 give 5 units.    For Pre-Meal  - 289 give 6 units.    For Pre-Meal  - 314 give 7 units.    For Pre-Meal  - 339 give 8 units.    For Pre-Meal  - 364 give 9 units.    For Pre-Meal BG greater than or equal to 365 give 10 units   To be given with prandial insulin, and based on pre-meal blood glucose.      HIGH INSULIN RESISTANCE DOSING     Do Not give Bedtime Correction Insulin if BG less than 200.    For  - 224 give 1 units.    For  - 249 give 2 units.    For  - 274 give 3 units.    For  - 299 give 4 units.    For  - 324 give 5 units.    For  - 349 give 6 units.    For BG greater than or equal to 350 give 7 units.        Blood Glucose Checks: three times daily before meals, and at bedtime.    Endocrinology Outpatient follow up: An appointment request was sent to the [facility]  Endocrinology Clinic coordinator to schedule your outpatient diabetes appointment 1-2 weeks from discharge. Please call the clinic at [phone] if you do not have an appointment scheduled on discharge, or if you have non-urgent questions regarding your blood sugars or insulin.     If you have urgent questions or concerns regarding your blood sugars or insulin, you may contact [phone] (the main hospital ). Ask to speak with the endocrinologist on call.    Your target A1c value is less than 7%.     Thank you for letting the Diabetes Management Team be involved in your care!
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
## 2023-09-21 NOTE — PROGRESS NOTES
SPIRITUAL HEALTH SERVICES  SPIRITUAL ASSESSMENT Progress Note  [facility]     REFERRAL SOURCE: I did visit this morning patient Nena per [facility]  referral triaged. I introduced myself as the unit  and shared all the info regarding the SHS. I also encouraged the pt to come into my spirituality group and she did. Pt was a good listener and talk a little. She participate well and shared her hope and coping skill during the group discussion. Pt appreciated the group and wants to join the group next week again.     PLAN: I will remain open to provide spiritual care for the pt as needed.    Kun Israel M.Div ([place])., M.Th., D.Min., [facility]  Staff   Pager [phone]

## 2023-09-21 NOTE — PLAN OF CARE
"Goal Outcome Evaluation:    Plan of Care Reviewed With: patient               Problem: Plan of Care - These are the overarching goals to be used throughout the patient stay.    Goal: Plan of Care Review  Description: The Plan of Care Review/Shift note should be completed every shift.  The Outcome Evaluation is a brief statement about your assessment that the patient is improving, declining, or no change.  This information will be displayed automatically on your shift note.  Outcome: Progressing     Pt was up and visible in the milieu intermittently. Pt is isolative and withdrawn from others. Pt attended group. Pt  AM, BG lunch time 168, pt received coverage. Pt denies SI/HI/SIB, pt endorses auditory hallucinations but unable to describe what the voices are saying. Pt is contract for safety. Endorses \"moderate\" anxiety and depression. Pt is voluntary. On SI and fall precaution. Safety was maintained throughout the shift. No medications side effects observed or reported. Hygiene appears unkempt. Pt would like to finish taking her hair down then she can shower. Please offer briefs. Good intake of food and fluids. Pt on carb counting. Pt had 75% for breakfast and 100% lunch. Pt was seen by medicine. Amantadine 200 mg has been discontinued. Has a new order for sumatriptan 25 mg.   Blood pressure (!) 170/76, pulse 73, temperature 97.4  F (36.3  C), temperature source Oral, height 1.524 m (5'), weight 110.5 kg (243 lb 9.6 oz), last menstrual period 01/06/2015, SpO2 96 %, not currently breastfeeding.    "

## 2023-09-21 NOTE — CONSULTS
Brief Diabetes Consult Note:  Patient not seen today    Diabetes service has reviewed salient aspects of care, BG trend and insulin plan today.       History:  Nena Tang is a 63 year old women with a past history notable for schizoaffective disorder, depressive type, PTSD, CINDY, type II diabetes mellitus, COVID infection 1 month ago, among others. Patient presented to the emergency department for evaluation of suicidal ideation        BG Trends:  BG mostly stable within range over last 24 hours. Elevated BG readings in the 200's last evening are post prandial.     Last A1c stable        PTA diabetic regimen:  Lantus 33 units bid  Humalog 8 units tid with meals   Humalog 3 units for every 50 mg/dl over 150  Trulicity 3 mg every 7 days    Last Comprehensive Metabolic Panel:  Lab Results   Component Value Date     09/20/2023    POTASSIUM 4.9 09/20/2023    CHLORIDE 101 09/20/2023    CO2 22 09/20/2023    ANIONGAP 13 09/20/2023     (H) 09/21/2023    BUN 32.0 (H) 09/20/2023    CR 1.30 (H) 09/20/2023    GFRESTIMATED 46 (L) 09/20/2023    ALLEN 9.2 09/20/2023       Lab Results   Component Value Date    A1C 7.0 09/21/2023    A1C 6.7 06/20/2023    A1C 6.5 03/14/2023    A1C 6.4 12/12/2022    A1C 7.4 08/17/2022    A1C 9.1 12/01/2020    A1C 9.7 09/15/2020    A1C 8.6 06/30/2020    A1C 6.2 12/03/2019    A1C 6.6 08/06/2019         Assessment:  1.  T2DM  Most recent A1c 6.7% on 6/20/2023--> repeat order for 9/21/2023  2.  Hyperglycemia exacerbated by stress/steroids  3.  elevated BMI:  BMI 47  4.  Anemia  5. Steroid induced hyperglycemia          Recommendations:  -Continue lantus 33 units BID  -Continue 1:8 CHO novolog insulin with meals and snacks  -Continue high intensity sliding scale  -Consider starting victoza 1.2 mg daily while in the hospital if greater than 7 days since last trulicity dose.-- consider decrease of CHO novolog to 1:10 if starting victoza   -BG checks TID AC, HS, 0200  -Hypoglycemia  protocol  -Carb counting protocol  -Please contact IDS with any anticpated changes that would impact glycemic control OR if there are any acute changes/concerns with regard to glycemic control.   -IDS will not continue to follow since patient BG are stable at this time      Medical Decision Making       35 MINUTES SPENT BY ME on the date of service doing chart review, history, exam, documentation & further activities per the note.  NOTE(S)/MEDICAL RECORDS REVIEWED over the past 24 hours: as above             ART Fernandez CNP  Inpatient Diabetes Management Service  Pager - [phone]  Available on [employer]     To contact Endocrine Diabetes service:   From 7AM-5PM: page inpatient diabetes provider that is following the patient that day (see filed or incomplete progress notes/consult notes).  If uncertain of provider assignment: page job code [phone].  For questions or updates from 5PM-7AM: page the diabetes job code for on call fellow: [phone]    Please notify inpatient diabetes service if changes are planned to steroids, nutrition, or if procedures are planned requiring prolonged NPO status.Diabetes Management Team job code: [phone]

## 2023-09-21 NOTE — PROGRESS NOTES
Behavioral Health  Note   Behavioral Health  Spirituality Group Note     Unit [address]    Name: Nena Tang    YOB: 1960   MRN: [id number]    Age: 63 year old     Patient attended -led group, which included discussion of spirituality, coping with illness and building resilience.   Patient attended group for [facility] - spirituality groups are not billed.   patient minimally participated, but was respectful to the group process.     [facility]  Staff    Page [phone]

## 2023-09-21 NOTE — PLAN OF CARE
Team Note Due:  Wednesday     Assessment/Intervention/Current Symtoms and Care Coordination:  Chart review and met with team, discussed pt progress, symptomology, and response to treatment.  Discussed the discharge plan and any potential impediments to discharge.    - Patient is med compliant, affect is flat but brightens when approached. Continues to endorsed AH.   - Writer called Pt's group home and spoke with [facility]. Writer provided update regarding pt's progress. Amal requested assistance with rescheduling pt's upcoming appts with [facility] and [facility].   - Writer placed request with Tx coordinators to reschedule upcoming appts.        Discharge Plan or Goal:  Group home with outpatient providers        Barriers to Discharge:  Admitted for SI, AH, and worsening depression. Needs clinical stabilization and med mgmt     Referral Status:  None today      Legal Status:  Voluntary    County: [place]    Contacts:  Group Home:  Name/Clinic: [facility]    Number: [phone]  ([facility])     Upcoming Meetings and Dates/Important Information and next steps:

## 2023-09-21 NOTE — PLAN OF CARE
BEH IP Unit Acuity Rating Score (UARS)   Acuity Rating for Station 32N currently located on 4AW as of 6/22/2023.   expanded to stations 20N and 22N as of 9/20/2023  Patient is given one point for every criteria they meet.    CRITERIA SCORING   On a 72 hour hold, court hold, committed, stay of commitment, or revocation 0    Patient LOS on BEH unit exceeds 20 days 0  LOS: 1   Patient under guardianship, 55+, otherwise medically complex, or under age 11 1   Suicide ideation without relief of precipitating factors 1   Current plan for suicide 1   Current plan for homicide 0   Imminent risk or actual attempt to seriously harm another without relief of factors precipitating the attempt 0   Severe dysfunction in daily living (ex: complete neglect for self care, extreme disruption in vegetative function, extreme deterioration in social interactions) 1   Recent (last 7 days) or current physical aggression in the ED or on unit 0   Restraints or seclusion episode in past 72 hours 0   Recent (last 7 days) or current verbal aggression, agitation, yelling, etc., while in the ED or unit 0   Active psychosis 1   Need for constant or near constant redirection (from leaving, from others, etc).  0   Intrusive or disruptive behaviors 0   TOTAL 5

## 2023-09-21 NOTE — CONSULTS
"NEW INPATIENT DIABETES MANAGEMENT CONSULT  Nena Tang  Age: 63 year old  MRN # [id number]   YOB: 1960    Chief Complaint: elevated BG  Reason for Consult: \"insulin dose + recs, T2DM w retinopathy, recently started on long term daily pred for PMR, poor glycemic control with worse A1c\"  Consulting Provider: Rebecca Jensen MD    History of Present Illness:   Nena Tang is a 63 year old women with a past history notable for schizoaffective disorder, depressive type, PTSD, CINDY, type II diabetes mellitus, COVID infection 1 month ago, among others. Patient presented to the emergency department for evaluation of suicidal ideation       Nena notes she has had type 2 diabetes \"for years\". Per outpatient notes and confirmed by patient she is most recently controlled on lantus 33 units BID, Humalog 8 units, humalog sliding scale and trulicity. She last took truliciity over 7 days ago. Patient current receiving prednisone 5 mg TID- plan is to hold HS dose of prednsione and give 10 units tomorrow. Nena reports she was taking the same insulin regimen prior to starting prednisone. She did not elaborate on BG control PTA    Pt is not known to the Inpatient Diabetes Service from past admission(s).    History obtained via the patient, chart review, and discussion with consulting team.    Interval History:   BG mostly stable- some post prandial elevation. Per primary team the plan is to taper prednisone- Nena was on current regimen prior to prednisone start. Taper plan unknown at this time.         Currently, denies nausea, vomiting, diarrhea or pain. Patient agreeable to the bellow plan.       Most recent PTA diabetes regimen includes:   Lantus 33 units bid  Humalog 8 units tid with meals   Humalog 3 units for every 50 mg/dl over 150  Trulicity 3 mg every 7 days    BG monitor: fingerstick as needed  CGM:dexcom    BG upon this admission: 164   Renal function: Creatinine (1.30), eGFR (46)  BMP: " "Bicarb (22), Anion Gap (13)  Cpeptide 1.4 in 2009. Epic search negative for dannie or insulin antibodies  Receiving steroids: 5 mg prednisone TID  Pt is eating.   Pt is not receiving dextrose IVF      Other Active/Contributory Medical Problems: BMI 47,   Diabetes Mellitus Type: 2  Duration:    \"years\" per patient, anuj A1c is 11% in 2009  Diabetic Complications: retinopathy, CKD, neuropathy  PTA Diet: 3 meals per day- did not elaborate  Usual BG control PTA:  reasonable control, with A1c 7.0 on 9/21/2023  History of DKA: no  Able to Detect Hypoglycemia: yes- feels low when BG less than 90 (shaky)  Last dilated eye exam: epic negative for data  Usual Diabetes Care Provider: Dr. Rainey (PCP)  Primary Care Provider: Albino Rainey  Current Diet: Orders Placed This Encounter      Regular Diet Adult       10 point ROS completed with pertinent positives and negatives noted in the HPI  Past medical, family and social histories are reviewed and updated.    Social History  Social History     Socioeconomic History    Marital status:     Number of children: 2   Tobacco Use    Smoking status: Never    Smokeless tobacco: Never   Vaping Use    Vaping Use: Never used   Substance and Sexual Activity    Alcohol use: Not Currently     Comment: when younger    Drug use: No     Comment: history of    Sexual activity: Not Currently     Partners: Male     Birth control/protection: None, Condom   Other Topics Concern     Service No    Blood Transfusions No    Caffeine Concern No    Occupational Exposure No    Hobby Hazards No    Sleep Concern Yes    Stress Concern Yes    Weight Concern Yes    Special Diet Yes     Comment: DM    Back Care Yes    Exercise Yes     Comment: WALKS DAILY    Seat Belt Yes    Self-Exams No     Comment: ENCOURAGED   Social History Narrative     10/2014. Has 2 sons. 7 grandchildren.         Unemployed. Graduated HS.         Tobacco use: Denies    Alcohol use: Escalated use since "   10/2014    Drug: Denies     Social Determinants of Health     Interpersonal Safety: Not At Risk (2022)    Humiliation, Afraid, Rape, and Kick questionnaire     Fear of Current or Ex-Partner: No     Emotionally Abused: No     Physically Abused: No     Sexually Abused: No        Tobacco: denies    Alcohol:denies    Marital Status:     Place of Residence: group home    Occupation: n/a    Family History  Mother had type 2 diabetes     Physical Exam   BP (!) 170/76   Pulse 73   Temp 97.4  F (36.3  C) (Oral)   Ht 1.524 m (5')   Wt 110.5 kg (243 lb 9.6 oz)   LMP 2015 (Exact Date)   SpO2 96%   BMI 47.57 kg/m    Constitutional: pleasant, in no distress.  HEENT: normocephalic, atraumatic. Oral mucous membranes moist.   Lungs: unlabored respiration, no cough  ABD: rounded  Skin: warm and dry, no obvious lesions- limited assessment  MSK:  moves all extremities  Mental status:  alert, oriented to self, place, time  Psych:   calm and appropriate interaction     Most Recent Laboratory Tests:  Recent Labs   Lab 23  0016   HGB 10.0*     Recent Labs   Lab 23  0908   A1C 7.0*     Recent Labs   Lab 23  0016   CR 1.30*     Recent Labs   Lab 23  1209 23  0804 23  2158 23  1744 23  1235 23  0706   * 141* 245* 252* 180* 157*       Assessment:   Type 2 diabetes with reasonable control (A1c of 7.0%)    Plan:    -Lantus 33 units BID-- decrease to 28 units BID with prednisone taper   -Novolog 1:8 CHO coverage   -Novolog high sliding scale AC/HS   -BG monitoring TID AC, HS, 0200   -hypoglycemia protocol   -recommend carb consistent diet with carb counting protocol   -diabetes education needs will be assessed closer to discharge   -on discharge, will recommend outpatient follow up with [facility] service     Discussed plan of care with patient, nursing, and primary team   Thank you for this consult; Inpatient Diabetes will continue to  follow.         Contacting the Inpatient Diabetes Team   From 7AM-5PM: page inpatient diabetes provider that is following the patient, or utilize the job code paging system.   From 5PM-7AM: page the job code for endocrine fellow on call.     Please use the following job code to reach the Inpatient Diabetes team. Note that you must use an in house phone and that job codes cannot receive text pages.   Dial [phone] (or star-star-star [phone] on the new [employer] telephones), then [phone] to reach the endocrine-diabetes provider on call.    I spent a total of 110 minutes face to face or coordinating care of Nena Tang.             Over 50% of my time on the unit was spent counseling the patient and/or coordinating care regarding acute hyperglycemia management.  See note for details.    ART Stanley, CNP  Inpatient Diabetes Service  Pager: [phone]

## 2023-09-21 NOTE — PLAN OF CARE
Problem: Sleep Disturbance  Goal: Adequate Sleep/Rest  Outcome: Progressing   Goal Outcome Evaluation:    Pt appears have slept for 6.75 hours.  Pt had an uneventful night. No pain issues reported. No PRN given. No safety or behavioral concern this shift. Safety rounds completed. Will continue to monitor and update any new safety or behavorial concern.      Temp: 98.8  F (37.1  C) Temp src: Oral BP: 133/72 Pulse: 82     SpO2: 96 % O2 Device: None (Room air)

## 2023-09-22 LAB
ATRIAL RATE - MUSE: 75 BPM
DEPRECATED CALCIDIOL+CALCIFEROL SERPL-MC: 31 UG/L (ref 20–75)
DIASTOLIC BLOOD PRESSURE - MUSE: NORMAL MMHG
GLUCOSE BLDC GLUCOMTR-MCNC: 155 MG/DL (ref 70–99)
GLUCOSE BLDC GLUCOMTR-MCNC: 168 MG/DL (ref 70–99)
GLUCOSE BLDC GLUCOMTR-MCNC: 197 MG/DL (ref 70–99)
GLUCOSE BLDC GLUCOMTR-MCNC: 217 MG/DL (ref 70–99)
GLUCOSE BLDC GLUCOMTR-MCNC: 327 MG/DL (ref 70–99)
INTERPRETATION ECG - MUSE: NORMAL
P AXIS - MUSE: 24 DEGREES
PR INTERVAL - MUSE: 170 MS
QRS DURATION - MUSE: 84 MS
QT - MUSE: 374 MS
QTC - MUSE: 417 MS
R AXIS - MUSE: -44 DEGREES
SYSTOLIC BLOOD PRESSURE - MUSE: NORMAL MMHG
T AXIS - MUSE: 5 DEGREES
VENTRICULAR RATE- MUSE: 75 BPM

## 2023-09-22 PROCEDURE — 99232 SBSQ HOSP IP/OBS MODERATE 35: CPT | Mod: GC | Performed by: PSYCHIATRY & NEUROLOGY

## 2023-09-22 PROCEDURE — 250N000013 HC RX MED GY IP 250 OP 250 PS 637

## 2023-09-22 PROCEDURE — 124N000002 HC R&B MH UMMC

## 2023-09-22 PROCEDURE — 250N000012 HC RX MED GY IP 250 OP 636 PS 637

## 2023-09-22 PROCEDURE — 90853 GROUP PSYCHOTHERAPY: CPT

## 2023-09-22 PROCEDURE — 99233 SBSQ HOSP IP/OBS HIGH 50: CPT

## 2023-09-22 RX ORDER — CYPROHEPTADINE HYDROCHLORIDE 4 MG/1
4 TABLET ORAL
Status: DISCONTINUED | OUTPATIENT
Start: 2023-09-22 | End: 2023-09-27

## 2023-09-22 RX ORDER — PREDNISONE 5 MG/1
10 TABLET ORAL DAILY
Status: DISCONTINUED | OUTPATIENT
Start: 2023-09-27 | End: 2023-09-26 | Stop reason: DRUGHIGH

## 2023-09-22 RX ORDER — LIDOCAINE 4 G/G
1 PATCH TOPICAL
Status: DISCONTINUED | OUTPATIENT
Start: 2023-09-22 | End: 2023-09-27 | Stop reason: HOSPADM

## 2023-09-22 RX ORDER — SUMATRIPTAN 25 MG/1
25 TABLET, FILM COATED ORAL ONCE
Status: COMPLETED | OUTPATIENT
Start: 2023-09-22 | End: 2023-09-22

## 2023-09-22 RX ADMIN — Medication 250 MG: at 21:14

## 2023-09-22 RX ADMIN — PANTOPRAZOLE SODIUM 40 MG: 40 TABLET, DELAYED RELEASE ORAL at 08:47

## 2023-09-22 RX ADMIN — ATORVASTATIN CALCIUM 80 MG: 80 TABLET, FILM COATED ORAL at 21:16

## 2023-09-22 RX ADMIN — PALIPERIDONE 12 MG: 3 TABLET, EXTENDED RELEASE ORAL at 21:15

## 2023-09-22 RX ADMIN — PREDNISONE 12.5 MG: 10 TABLET ORAL at 12:29

## 2023-09-22 RX ADMIN — DOCUSATE SODIUM 100 MG: 100 CAPSULE, LIQUID FILLED ORAL at 08:46

## 2023-09-22 RX ADMIN — ASPIRIN 81 MG: 81 TABLET, COATED ORAL at 08:47

## 2023-09-22 RX ADMIN — MIRABEGRON 50 MG: 25 TABLET, FILM COATED, EXTENDED RELEASE ORAL at 08:47

## 2023-09-22 RX ADMIN — ACETAMINOPHEN 1000 MG: 500 TABLET, FILM COATED ORAL at 06:47

## 2023-09-22 RX ADMIN — TRAZODONE HYDROCHLORIDE 100 MG: 100 TABLET ORAL at 21:16

## 2023-09-22 RX ADMIN — DOCUSATE SODIUM 100 MG: 100 CAPSULE, LIQUID FILLED ORAL at 21:16

## 2023-09-22 RX ADMIN — INSULIN GLARGINE 30 UNITS: 100 INJECTION, SOLUTION SUBCUTANEOUS at 09:48

## 2023-09-22 RX ADMIN — INSULIN ASPART 6 UNITS: 100 INJECTION, SOLUTION INTRAVENOUS; SUBCUTANEOUS at 21:57

## 2023-09-22 RX ADMIN — QUETIAPINE FUMARATE 125 MG: 100 TABLET ORAL at 21:17

## 2023-09-22 RX ADMIN — LOSARTAN POTASSIUM 100 MG: 25 TABLET, FILM COATED ORAL at 08:47

## 2023-09-22 RX ADMIN — METOPROLOL SUCCINATE 100 MG: 25 TABLET, FILM COATED, EXTENDED RELEASE ORAL at 21:15

## 2023-09-22 RX ADMIN — LIDOCAINE PATCH 4% 1 PATCH: 40 PATCH TOPICAL at 15:10

## 2023-09-22 RX ADMIN — INSULIN GLARGINE 30 UNITS: 100 INJECTION, SOLUTION SUBCUTANEOUS at 22:00

## 2023-09-22 RX ADMIN — METOPROLOL SUCCINATE 50 MG: 25 TABLET, FILM COATED, EXTENDED RELEASE ORAL at 08:46

## 2023-09-22 RX ADMIN — AMANTADINE HYDROCHLORIDE 100 MG: 100 CAPSULE ORAL at 08:47

## 2023-09-22 RX ADMIN — SUMATRIPTAN SUCCINATE 25 MG: 25 TABLET ORAL at 12:31

## 2023-09-22 RX ADMIN — ACETAMINOPHEN 1000 MG: 500 TABLET, FILM COATED ORAL at 21:14

## 2023-09-22 RX ADMIN — Medication 600 MG: at 08:47

## 2023-09-22 RX ADMIN — GABAPENTIN 300 MG: 300 CAPSULE ORAL at 21:15

## 2023-09-22 RX ADMIN — SENNOSIDES AND DOCUSATE SODIUM 1 TABLET: 50; 8.6 TABLET ORAL at 21:20

## 2023-09-22 RX ADMIN — ESCITALOPRAM OXALATE 20 MG: 10 TABLET ORAL at 08:48

## 2023-09-22 ASSESSMENT — ACTIVITIES OF DAILY LIVING (ADL)
ADLS_ACUITY_SCORE: 43
ADLS_ACUITY_SCORE: 49
ADLS_ACUITY_SCORE: 43
ADLS_ACUITY_SCORE: 49
ORAL_HYGIENE: INDEPENDENT
ADLS_ACUITY_SCORE: 43
ADLS_ACUITY_SCORE: 49
LAUNDRY: UNABLE TO COMPLETE
DRESS: INDEPENDENT;SCRUBS (BEHAVIORAL HEALTH)
ADLS_ACUITY_SCORE: 49
ADLS_ACUITY_SCORE: 43
HYGIENE/GROOMING: HANDWASHING;INDEPENDENT

## 2023-09-22 NOTE — PLAN OF CARE
Team Note Due:  Wednesday     Assessment/Intervention/Current Symtoms and Care Coordination:  Chart review and met with team, discussed pt progress, symptomology, and response to treatment.  Discussed the discharge plan and any potential impediments to discharge.    - Patient is med compliant, affect is flat but brightens when approached. Continues to endorsed AH telling her to hurt herself.    - Med management appointment has been scheduled with Stefanie. Appointment info added to AVS by Care Coordinator.   - Writer met with pt, her affect is flat and mood is depressed. She signed [facility] for [facility], Stefanie, and [facility].        Discharge Plan or Goal:  Group home with outpatient providers        Barriers to Discharge:  Admitted for SI, AH, and worsening depression. Needs clinical stabilization and med mgmt     Referral Status:  None today      Legal Status:  Voluntary    County: [place]    Contacts:  Group Home:  [facility]    Number: [phone]  ([place])     Upcoming Meetings and Dates/Important Information and next steps:
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
## 2023-09-22 NOTE — PLAN OF CARE
"Goal Outcome Evaluation:    Plan of Care Reviewed With: patient          Problem: Plan of Care - These are the overarching goals to be used throughout the patient stay.    Goal: Plan of Care Review  Description: The Plan of Care Review/Shift note should be completed every shift.  The Outcome Evaluation is a brief statement about your assessment that the patient is improving, declining, or no change.  This information will be displayed automatically on your shift note.  Outcome: Progressing        Pt BG this morning 168 and lunch 155. Coverage was given. Pt was up and visible in the milieu intermittently. Pt is isolative and withdrawn from others. Pt denies SI/HI/SIB, pt endorses command auditory hallucinations, stated the voice are saying, \"go kill yourself!\"  Pt is contract for safety. Endorses \"moderate\" anxiety and depression. Pt is voluntary. On SI and fall precaution. Safety was maintained throughout the shift. No medications side effects observed or reported. Hygiene appears unkempt. Pt is set for shower this afternoon. Good intake of food and fluids. Pt on carb counting. Pt had 100% for breakfast and 75% lunch. Prednisone was decreased to 12.5 mg. Blood pressure (!) 141/84, pulse 66, temperature 99  F (37.2  C), temperature source Oral, resp. rate 16, height 1.524 m (5'), weight 110.5 kg (243 lb 9.6 oz), last menstrual period 01/06/2015, SpO2 96 %, not currently breastfeeding.    At 1408 pt was sitting to talk to the Provider Rebecca POTTS, upon attempt to sit, pt missed the chair and ended up sitting on the floor. Upon assessment, Pt did not hit her head. Pt was c/o some pain to R arm and lower back. pt c/o dizziness. VS: /79, HR 81, O2 sats 98% RA, RR 16. Pt is alert and oriented x 4. Pt does not show and sign and symptoms of being under distress. Provider was paged.     Pt was seen by Dr. Giang. Medicine consult will be placed. Pt stated, she is weak, she can't ambulate. Pt was provided with W/C. Pt " is now sitting in the milieu watching TV. Pt has a new order for Lidocaine patch.

## 2023-09-22 NOTE — PLAN OF CARE
No PRNs given or requested. Remained in her room the entire shift. No concerns noted this shift.  Pt appears to have slept for 6.50 hours. Will continue to monitor and offer support.     Problem: Sleep Disturbance  Goal: Adequate Sleep/Rest  Outcome: Progressing   Goal Outcome Evaluation:

## 2023-09-22 NOTE — PROGRESS NOTES
"   9/22/2023    Group Therapy Session   Group Attendance attended group session   Time Session Began 1015   Time Session Ended 1100   Total Time (minutes) 45   Total # Attendees 4   Group Type psychotherapeutic   Group Topic Covered Group Topic Covered: cognitive therapy techniques, coping skills/lifestyle management, emotions/expression   Group Session Detail DBT House Activity - enhance self-awareness by using visual representation of emotional landscape, and help identify areas to develop/strengthen emotional regulation and coping skills.    Patient Response/Contribution listened actively, discussed personal experienced with topic   Patient Participation Detail Pt states she feels \"down\" today. Pt shared during activity that she feels like a failure and only lives for her grandchildren. Pt was tearful stating she has learned since coming to the hospital she has a lot to work on.      "

## 2023-09-22 NOTE — PROGRESS NOTES
IP Diabetes Management  Daily Note         HPI: Nena Tang is a 63 year old women with a past history notable for schizoaffective disorder, depressive type, PTSD, CINDY, type II diabetes mellitus, COVID infection 1 month ago, among others. Patient presented to the emergency department for evaluation of suicidal ideation          Assessment:   Type 2 diabetes with reasonable control (A1c of 7.0%)     Plan:     -Lantus 28 units BID-- increase to 30 units BID with prednisone taper              -Novolog 1:8 CHO coverage--- increase to 1:7              -Novolog high sliding scale AC/HS              -BG monitoring TID AC, HS, 0200              -hypoglycemia protocol              -recommend carb consistent diet with carb counting protocol              -diabetes education needs will be assessed closer to discharge              -on discharge, will recommend outpatient follow up with [facility] service     Discharge Planning:    -Tentative diabetic regimen: Lantus and humalog   -diabetes education needs: Likely none- patient's group home manages medications   -Outpatient follow up: [facility]  -Test claim: none    Plan discussed with patient, bedside RN  primary team- paged.      Interval History and Assessment: interval glucose trend reviewed:   BG elevated last evening- added cho coverage for lunch and dinner as well and increased to 1:7.     Fasting BG elevated this AM- increased lantus back to 30 units BID. Patient's HS dose of prednisone was held last night. Taper plan as bellow       On exam patient had witnessed fall: Patient sat on edge of chair in dinning area and slipped down on to her buttocks. Patient did not hit her head. Patient notes bumping her elbow but reports it is not painful. Patient notes chronic lower back pain that has not worsened. Patient alert and oriented. Notes some dizziness but is unchanged from last night. Nursing assessed: VS and BS stable. Nursing contacting  primary team.   I paged primary team        Currently, Patient denies nausea vomiting or diarrhea. Dizziness and pain as above. Patient agreeable to above plan    Labs:9/20  Bicarb:22  Creatinine: 1.30  eGFR: 46  Anion Gap: 13    Current nutritional intake and type: Orders Placed This Encounter      Regular Diet Adult      Planned Procedures/surgeries: none  Steroid planning:   D5W-containing solutions/medications: none    PTA Diabetes Regimen:   Lantus 33 units bid  Humalog 8 units tid with meals   Humalog 3 units for every 50 mg/dl over 150  Trulicity 3 mg every 7 days     BG monitor: fingerstick as needed  CGM:dexcom         Diabetes History:   Type of Diabetes: Type 2 Diabetes Mellitus  Lab Results   Component Value Date    A1C 7.0 09/21/2023    A1C 6.7 06/20/2023    A1C 6.5 03/14/2023    A1C 6.4 12/12/2022    A1C 7.4 08/17/2022    A1C 9.1 12/01/2020    A1C 9.7 09/15/2020    A1C 8.6 06/30/2020    A1C 6.2 12/03/2019    A1C 6.6 08/06/2019              Review of Systems:     The Review of Systems is negative other than noted in the Interval History.           Medications:     Current Facility-Administered Medications   Medication    acetaminophen (TYLENOL) tablet 1,000 mg    [Held by provider] alendronate (FOSAMAX) tablet 70 mg    amantadine (SYMMETREL) capsule 100 mg    [Held by provider] amantadine (SYMMETREL) capsule 200 mg    aspirin EC tablet 81 mg    atorvastatin (LIPITOR) tablet 80 mg    calcium carbonate (OS-ALLEN) tablet 600 mg    [Held by provider] clonazePAM (klonoPIN) tablet 0.5 mg    cyproheptadine (PERIACTIN) tablet 4 mg    glucose gel 15-30 g    Or    dextrose 50 % injection 25-50 mL    Or    glucagon injection 1 mg    diclofenac (VOLTAREN) 1 % topical gel 4 g    docusate sodium (COLACE) capsule 100 mg    escitalopram (LEXAPRO) tablet 20 mg    [Held by provider] estradiol (ESTRACE) cream 2 g    gabapentin (NEURONTIN) capsule 300 mg    hydrOXYzine (ATARAX) tablet 25 mg    insulin aspart (NovoLOG)  injection (RAPID ACTING)    insulin aspart (NovoLOG) injection (RAPID ACTING)    insulin aspart (NovoLOG) injection (RAPID ACTING)    insulin aspart (NovoLOG) injection (RAPID ACTING)    insulin glargine (LANTUS PEN) injection 30 Units    losartan (COZAAR) tablet 100 mg    magnesium gluconate (MAGONATE) tablet 250 mg    metoprolol succinate ER (TOPROL XL) 24 hr tablet 100 mg    metoprolol succinate ER (TOPROL XL) 24 hr tablet 50 mg    mirabegron (MYRBETRIQ) 24 hr tablet 50 mg    OLANZapine (zyPREXA) tablet 10 mg    Or    OLANZapine (zyPREXA) injection 10 mg    paliperidone ER (INVEGA) 24 hr tablet 12 mg    pantoprazole (PROTONIX) EC tablet 40 mg    [START ON 9/27/2023] predniSONE (DELTASONE) tablet 10 mg    [START ON 9/23/2023] predniSONE (DELTASONE) tablet 12.5 mg    QUEtiapine (SEROquel) tablet 100 mg    senna-docusate (SENOKOT-S/PERICOLACE) 8.6-50 MG per tablet 1 tablet    traZODone (DESYREL) tablet 100 mg            Physical Exam:    BP (!) 141/84 (BP Location: Right arm, Patient Position: Sitting, Cuff Size: Adult Large)   Pulse 66   Temp 99  F (37.2  C) (Oral)   Resp 16   Ht 1.524 m (5')   Wt 110.5 kg (243 lb 9.6 oz)   LMP 01/06/2015 (Exact Date)   SpO2 96%   BMI 47.57 kg/m    General: pleasant, in no distress. Sitting in chair   HEENT: normocephalic, atraumatic. Oral mucous membranes moist.   Lungs: unlabored respiration, no cough  ABD: rounded  Skin: warm and dry, no obvious lesions- limited assessment  MSK:  moves all extremities   Mental status:  alert, oriented to self, place, time- forgetful  Psych:  flat affect, calm and withdrawn            Data:     Recent Labs   Lab 09/22/23  0813 09/21/23  2124 09/21/23  1823 09/21/23  1209 09/21/23  0804 09/20/23  2158   * 267* 289* 168* 141* 245*     Lab Results   Component Value Date    WBC 8.3 09/20/2023    WBC 8.9 08/17/2023    WBC 6.1 07/11/2023    HGB 10.0 (L) 09/20/2023    HGB 9.8 (L) 08/17/2023    HGB 10.3 (L) 07/11/2023    HCT 31.2 (L)  09/20/2023    HCT 30.5 (L) 08/17/2023    HCT 32.9 (L) 07/11/2023     (H) 09/20/2023     (H) 08/17/2023     (H) 07/11/2023     09/20/2023     08/17/2023     07/11/2023     Lab Results   Component Value Date     09/20/2023     08/17/2023     07/11/2023    POTASSIUM 4.9 09/20/2023    POTASSIUM 3.9 08/17/2023    POTASSIUM 4.3 07/11/2023    CHLORIDE 101 09/20/2023    CHLORIDE 100 08/17/2023    CHLORIDE 105 07/11/2023    CO2 22 09/20/2023    CO2 26 08/17/2023    CO2 23 07/11/2023     (H) 09/22/2023     (H) 09/21/2023     (H) 09/21/2023     Lab Results   Component Value Date    BUN 32.0 (H) 09/20/2023    BUN 15.0 08/17/2023    BUN 24.0 (H) 07/11/2023     Lab Results   Component Value Date    TSH 2.86 09/21/2023    TSH 1.60 05/10/2023    TSH 2.69 08/17/2022     Lab Results   Component Value Date    AST 13 09/20/2023    AST 13 08/17/2023    AST 16 06/25/2023    ALT 26 09/20/2023    ALT 31 08/17/2023    ALT 37 06/25/2023    GGT 42 (H) 10/25/2016    ALKPHOS 63 09/20/2023    ALKPHOS 63 08/17/2023    ALKPHOS 69 06/25/2023       I spent a total of 60 minutes face to face or coordinating care of Nena Tang.             Over 50% of my time on the unit was spent counseling the patient and/or coordinating care regarding acute hyperglycemia management.  See note for details.      Contacting the Inpatient Diabetes Team   From 7AM-5PM: page inpatient diabetes provider that is following the patient, or utilize the job code paging system. From 5PM-7AM: page the job code for endocrine fellow on call.     Please use the following job code to reach the Inpatient Diabetes team. Note that you must use an in house phone and that job codes cannot receive text pages.   Dial [phone] (or star-star-star [phone] on the new [employer] telephones), then [phone] to reach the endocrine-diabetes provider on call.    ART Stanley, CNP  Inpatient Diabetes Service   Pager:  [phone]

## 2023-09-22 NOTE — PLAN OF CARE
BEH IP Unit Acuity Rating Score (UARS)   Acuity Rating for Station 32N currently located on 4AW as of 6/22/2023.   expanded to stations 20N and 22N as of 9/20/2023  Patient is given one point for every criteria they meet.    CRITERIA SCORING   On a 72 hour hold, court hold, committed, stay of commitment, or revocation 0    Patient LOS on BEH unit exceeds 20 days 0  LOS: 2   Patient under guardianship, 55+, otherwise medically complex, or under age 11 1   Suicide ideation without relief of precipitating factors 1   Current plan for suicide 1   Current plan for homicide 0   Imminent risk or actual attempt to seriously harm another without relief of factors precipitating the attempt 0   Severe dysfunction in daily living (ex: complete neglect for self care, extreme disruption in vegetative function, extreme deterioration in social interactions) 1   Recent (last 7 days) or current physical aggression in the ED or on unit 0   Restraints or seclusion episode in past 72 hours 0   Recent (last 7 days) or current verbal aggression, agitation, yelling, etc., while in the ED or unit 0   Active psychosis 1   Need for constant or near constant redirection (from leaving, from others, etc).  0   Intrusive or disruptive behaviors 0   TOTAL 5

## 2023-09-23 LAB
ALBUMIN UR-MCNC: 10 MG/DL
ANION GAP SERPL CALCULATED.3IONS-SCNC: 9 MMOL/L (ref 7–15)
APPEARANCE UR: ABNORMAL
BACTERIA #/AREA URNS HPF: ABNORMAL /HPF
BASOPHILS # BLD AUTO: 0 10E3/UL (ref 0–0.2)
BASOPHILS NFR BLD AUTO: 0 %
BILIRUB UR QL STRIP: NEGATIVE
BUN SERPL-MCNC: 28.6 MG/DL (ref 8–23)
CALCIUM SERPL-MCNC: 9.7 MG/DL (ref 8.8–10.2)
CHLORIDE SERPL-SCNC: 102 MMOL/L (ref 98–107)
CK SERPL-CCNC: 53 U/L (ref 26–192)
COLOR UR AUTO: ABNORMAL
CREAT SERPL-MCNC: 1.28 MG/DL (ref 0.51–0.95)
DEPRECATED HCO3 PLAS-SCNC: 26 MMOL/L (ref 22–29)
EGFRCR SERPLBLD CKD-EPI 2021: 47 ML/MIN/1.73M2
EOSINOPHIL # BLD AUTO: 0.1 10E3/UL (ref 0–0.7)
EOSINOPHIL NFR BLD AUTO: 1 %
ERYTHROCYTE [DISTWIDTH] IN BLOOD BY AUTOMATED COUNT: 12.7 % (ref 10–15)
GLUCOSE BLDC GLUCOMTR-MCNC: 131 MG/DL (ref 70–99)
GLUCOSE BLDC GLUCOMTR-MCNC: 209 MG/DL (ref 70–99)
GLUCOSE BLDC GLUCOMTR-MCNC: 275 MG/DL (ref 70–99)
GLUCOSE BLDC GLUCOMTR-MCNC: 366 MG/DL (ref 70–99)
GLUCOSE SERPL-MCNC: 262 MG/DL (ref 70–99)
GLUCOSE UR STRIP-MCNC: 100 MG/DL
HCT VFR BLD AUTO: 33.2 % (ref 35–47)
HGB BLD-MCNC: 10.9 G/DL (ref 11.7–15.7)
HGB UR QL STRIP: NEGATIVE
HYALINE CASTS: 2 /LPF
IMM GRANULOCYTES # BLD: 0.1 10E3/UL
IMM GRANULOCYTES NFR BLD: 1 %
KETONES UR STRIP-MCNC: NEGATIVE MG/DL
LEUKOCYTE ESTERASE UR QL STRIP: ABNORMAL
LYMPHOCYTES # BLD AUTO: 1.4 10E3/UL (ref 0.8–5.3)
LYMPHOCYTES NFR BLD AUTO: 14 %
MCH RBC QN AUTO: 33.4 PG (ref 26.5–33)
MCHC RBC AUTO-ENTMCNC: 32.8 G/DL (ref 31.5–36.5)
MCV RBC AUTO: 102 FL (ref 78–100)
MONOCYTES # BLD AUTO: 0.9 10E3/UL (ref 0–1.3)
MONOCYTES NFR BLD AUTO: 9 %
MUCOUS THREADS #/AREA URNS LPF: PRESENT /LPF
NEUTROPHILS # BLD AUTO: 7.6 10E3/UL (ref 1.6–8.3)
NEUTROPHILS NFR BLD AUTO: 75 %
NITRATE UR QL: NEGATIVE
NRBC # BLD AUTO: 0 10E3/UL
NRBC BLD AUTO-RTO: 0 /100
PH UR STRIP: 5 [PH] (ref 5–7)
PLATELET # BLD AUTO: 166 10E3/UL (ref 150–450)
POTASSIUM SERPL-SCNC: 4.8 MMOL/L (ref 3.4–5.3)
RBC # BLD AUTO: 3.26 10E6/UL (ref 3.8–5.2)
RBC URINE: 0 /HPF
RETICS # AUTO: 0.08 10E6/UL (ref 0.03–0.1)
RETICS/RBC NFR AUTO: 2.4 % (ref 0.5–2)
SODIUM SERPL-SCNC: 137 MMOL/L (ref 136–145)
SP GR UR STRIP: 1.02 (ref 1–1.03)
SQUAMOUS EPITHELIAL: 3 /HPF
UROBILINOGEN UR STRIP-MCNC: NORMAL MG/DL
WBC # BLD AUTO: 10.1 10E3/UL (ref 4–11)
WBC URINE: 73 /HPF

## 2023-09-23 PROCEDURE — 81001 URINALYSIS AUTO W/SCOPE: CPT

## 2023-09-23 PROCEDURE — H2032 ACTIVITY THERAPY, PER 15 MIN: HCPCS

## 2023-09-23 PROCEDURE — 124N000002 HC R&B MH UMMC

## 2023-09-23 PROCEDURE — 85014 HEMATOCRIT: CPT

## 2023-09-23 PROCEDURE — 250N000013 HC RX MED GY IP 250 OP 250 PS 637

## 2023-09-23 PROCEDURE — 99222 1ST HOSP IP/OBS MODERATE 55: CPT | Performed by: STUDENT IN AN ORGANIZED HEALTH CARE EDUCATION/TRAINING PROGRAM

## 2023-09-23 PROCEDURE — 80048 BASIC METABOLIC PNL TOTAL CA: CPT

## 2023-09-23 PROCEDURE — 87086 URINE CULTURE/COLONY COUNT: CPT

## 2023-09-23 PROCEDURE — 99232 SBSQ HOSP IP/OBS MODERATE 35: CPT

## 2023-09-23 PROCEDURE — 36415 COLL VENOUS BLD VENIPUNCTURE: CPT

## 2023-09-23 PROCEDURE — 250N000012 HC RX MED GY IP 250 OP 636 PS 637

## 2023-09-23 PROCEDURE — 82550 ASSAY OF CK (CPK): CPT

## 2023-09-23 PROCEDURE — 85045 AUTOMATED RETICULOCYTE COUNT: CPT

## 2023-09-23 PROCEDURE — 99231 SBSQ HOSP IP/OBS SF/LOW 25: CPT | Performed by: NURSE PRACTITIONER

## 2023-09-23 RX ORDER — ACETAMINOPHEN 500 MG
1000 TABLET ORAL 3 TIMES DAILY
Status: DISCONTINUED | OUTPATIENT
Start: 2023-09-23 | End: 2023-09-27 | Stop reason: HOSPADM

## 2023-09-23 RX ADMIN — INSULIN GLARGINE 30 UNITS: 100 INJECTION, SOLUTION SUBCUTANEOUS at 21:21

## 2023-09-23 RX ADMIN — PALIPERIDONE 12 MG: 3 TABLET, EXTENDED RELEASE ORAL at 20:43

## 2023-09-23 RX ADMIN — PANTOPRAZOLE SODIUM 40 MG: 40 TABLET, DELAYED RELEASE ORAL at 08:30

## 2023-09-23 RX ADMIN — QUETIAPINE FUMARATE 125 MG: 100 TABLET ORAL at 20:46

## 2023-09-23 RX ADMIN — LOSARTAN POTASSIUM 100 MG: 25 TABLET, FILM COATED ORAL at 08:29

## 2023-09-23 RX ADMIN — LIDOCAINE PATCH 4% 1 PATCH: 40 PATCH TOPICAL at 08:40

## 2023-09-23 RX ADMIN — ACETAMINOPHEN 1000 MG: 500 TABLET, FILM COATED ORAL at 08:29

## 2023-09-23 RX ADMIN — GABAPENTIN 300 MG: 300 CAPSULE ORAL at 20:46

## 2023-09-23 RX ADMIN — MIRABEGRON 50 MG: 25 TABLET, FILM COATED, EXTENDED RELEASE ORAL at 08:29

## 2023-09-23 RX ADMIN — DOCUSATE SODIUM 100 MG: 100 CAPSULE, LIQUID FILLED ORAL at 08:30

## 2023-09-23 RX ADMIN — Medication 250 MG: at 20:44

## 2023-09-23 RX ADMIN — ESCITALOPRAM OXALATE 20 MG: 10 TABLET ORAL at 08:29

## 2023-09-23 RX ADMIN — ACETAMINOPHEN 1000 MG: 500 TABLET ORAL at 14:02

## 2023-09-23 RX ADMIN — PREDNISONE 12.5 MG: 10 TABLET ORAL at 08:29

## 2023-09-23 RX ADMIN — SENNOSIDES AND DOCUSATE SODIUM 1 TABLET: 50; 8.6 TABLET ORAL at 20:46

## 2023-09-23 RX ADMIN — INSULIN GLARGINE 30 UNITS: 100 INJECTION, SOLUTION SUBCUTANEOUS at 09:00

## 2023-09-23 RX ADMIN — ATORVASTATIN CALCIUM 80 MG: 80 TABLET, FILM COATED ORAL at 20:44

## 2023-09-23 RX ADMIN — ASPIRIN 81 MG: 81 TABLET, COATED ORAL at 08:30

## 2023-09-23 RX ADMIN — INSULIN ASPART 4 UNITS: 100 INJECTION, SOLUTION INTRAVENOUS; SUBCUTANEOUS at 21:24

## 2023-09-23 RX ADMIN — TRAZODONE HYDROCHLORIDE 100 MG: 100 TABLET ORAL at 20:46

## 2023-09-23 RX ADMIN — Medication 600 MG: at 08:29

## 2023-09-23 RX ADMIN — DICLOFENAC SODIUM 4 G: 10 GEL TOPICAL at 21:00

## 2023-09-23 RX ADMIN — AMANTADINE HYDROCHLORIDE 100 MG: 100 CAPSULE ORAL at 08:30

## 2023-09-23 RX ADMIN — METOPROLOL SUCCINATE 50 MG: 25 TABLET, FILM COATED, EXTENDED RELEASE ORAL at 08:29

## 2023-09-23 RX ADMIN — DOCUSATE SODIUM 100 MG: 100 CAPSULE, LIQUID FILLED ORAL at 20:46

## 2023-09-23 RX ADMIN — ACETAMINOPHEN 1000 MG: 500 TABLET ORAL at 20:45

## 2023-09-23 RX ADMIN — METOPROLOL SUCCINATE 100 MG: 25 TABLET, FILM COATED, EXTENDED RELEASE ORAL at 20:43

## 2023-09-23 ASSESSMENT — ACTIVITIES OF DAILY LIVING (ADL)
DRESS: SCRUBS (BEHAVIORAL HEALTH);INDEPENDENT
ORAL_HYGIENE: PROMPTS;INDEPENDENT
ADLS_ACUITY_SCORE: 49
LAUNDRY: UNABLE TO COMPLETE
ADLS_ACUITY_SCORE: 49
ADLS_ACUITY_SCORE: 59
HYGIENE/GROOMING: HANDWASHING;INDEPENDENT
ADLS_ACUITY_SCORE: 49
ADLS_ACUITY_SCORE: 49
ORAL_HYGIENE: INDEPENDENT
ADLS_ACUITY_SCORE: 49
ADLS_ACUITY_SCORE: 59
DRESS: SCRUBS (BEHAVIORAL HEALTH);WITH ASSISTANCE
ADLS_ACUITY_SCORE: 59
HYGIENE/GROOMING: HANDWASHING;INDEPENDENT
ADLS_ACUITY_SCORE: 49
ADLS_ACUITY_SCORE: 49

## 2023-09-23 NOTE — PLAN OF CARE
No PRNs given or requested this shift. Pt remained in her room the entire shift. No concerns noted this No shift. Pt appears to have slept for 7 hours. Will continue to monitor and offer support.    Problem: Sleep Disturbance  Goal: Adequate Sleep/Rest  Outcome: Progressing   Goal Outcome Evaluation:

## 2023-09-23 NOTE — PLAN OF CARE
"Goal Outcome Evaluation:    Plan of Care Reviewed With: patient          Problem: Plan of Care - These are the overarching goals to be used throughout the patient stay.    Goal: Plan of Care Review  Description: The Plan of Care Review/Shift note should be completed every shift.  The Outcome Evaluation is a brief statement about your assessment that the patient is improving, declining, or no change.  This information will be displayed automatically on your shift note.  Outcome: Progressing        Pt SIO was discontinued. Pt was educated the importance of calling for help if she feel dizzy, light headed or unsteady. Pt acknowledged the understanding. Pt BG this morning 131 and lunch 209. Coverage was given. Pt was up and visible in the milieu intermittently. Pt is isolative and withdrawn from others. Pt denies SI/HI/SIB and hallucinations. Pt is contract for safety. Endorses \"mild\" anxiety and depression. Pt is voluntary. On SI and fall precaution. Safety was maintained throughout the shift. No medications side effects observed or reported. Pt need to be reminded to use the toilet Q2H.  Good intake of food and fluids. Pt on carb counting. Pt had 90% for breakfast and 75% lunch.  Pt had UA/UC order and labs for BMP, CBC, CK, and Reticulocyte. Labs was drawn this afternoon and urine specimen was collected and sent to labs. Blood pressure (!) 160/83, pulse 76, temperature 98.9  F (37.2  C), temperature source Oral, resp. rate 16, height 1.524 m (5'), weight 110.5 kg (243 lb 9.6 oz), last menstrual period 01/06/2015, SpO2 100 %, not currently breastfeeding.    Labs results are back and there are some abnormal results. Please refer to results in epic. Urinalysis is positive for glucose, protein, leukocyte, WBC, Bacteria and mucus.     Pt was seen by the Neurologist.   "

## 2023-09-23 NOTE — PLAN OF CARE
Problem: Depression  Goal: Improved Mood  Outcome: Progressing     Problem: Suicidal Behavior  Goal: Suicidal Behavior is Absent or Managed  Outcome: Progressing     Problem: Depression  Goal: Improved Mood  Outcome: Progressing     Problem: Adult Behavioral Health Plan of Care  Goal: Absence of New-Onset Illness or Injury  Outcome: Progressing  Intervention: Identify and Manage Fall Risk  Recent Flowsheet Documentation  Taken 9/23/2023 1653 by Juve Norris RN  Safety Measures:   environmental rounds completed   safety rounds completed   Goal Outcome Evaluation:    Plan of Care Reviewed With: patient        Alert and oriented x3, able to communicate needs. Pleasant, cooperative and compliant with medications. Visible in milieu, quiet with little interactions with peers, spent most of the shift in milieu. Independent with all ADLs, occasional incontinent, lower back pain soreness managed with rest and relaxation. Adequate PO intake. Denied auditory hallucinations, denied SI/HI, contracted for safety. Patient endorsed anxiety and depression, rated both at 6. mild headache. Blood sugars 366 and 275.

## 2023-09-23 NOTE — CARE PLAN
09/23/23 1600   Group Therapy Session   Group Attendance attended group session   Time Session Began 1315   Time Session Ended 1415   Total Time (minutes) 45   Total # Attendees 5   Group Type expressive therapy   Group Topic Covered emotions/expression   Patient Response/Contribution cooperative with task       Art Therapy directive was to create a worry stone out of air-dry bill to be used as a self-soothing tool/anxiety management.  Goals of directive: media (bill) exploration, emotional regulation, trauma containment, introduction of self-soothing exercise.  Pt was a quiet, engaged participant, focused on working with bill for the full time pt attended group.   Pt finished a bill worry stone(s) and said she would like to paint it in AT group tomorrow evening.  Pts mood was calm, pleasant participant.

## 2023-09-23 NOTE — PROGRESS NOTES
Diabetes Consult Daily  Progress Note          Assessment/Plan:     HPI:  Nena Tang is a 63 year old women with a past history notable for schizoaffective disorder, depressive type, PTSD, CINDY, type II diabetes mellitus, COVID infection 1 month ago, among others. Patient presented to the emergency department for evaluation of suicidal ideation         Assessment:     Type 2 diabetes with reasonable control (A1c of 7.0%) now with steroid hyperglycemia   2.   BMI:  47     Plan:     *steroids taper on 9/27 - recommend reduce meal insulin on 9/27 to accommodate for the steroid reduction.  MAY require lantus reduction if BG trends < target.                  -Lantus 30 units BID (0800 and 2000)               -Novolog 1:7 CHO coverage TID AC/snacks.  Cover all intake.                -Novolog high sliding scale AC/HS               -BG monitoring TID AC, HS, 0200     -PTA:  holding Trulicity while inpatient                -hypoglycemia protocol               -recommend carb consistent diet with carb counting protocol               -on discharge, will recommend outpatient follow up with [facility] service      -diabetes education needs: Likely none- patient's group home manages medications     -Outpatient follow up: [facility]    -Test claim: none      -Inpatient Diabetes Service Signing off 09/23/2023  Please call back with any questions or if BG become more labile or if getting closer to discharge and assistance is needed for home recommendations.    Please allow at least 24 hours for home recs if they are not provided.     Tentative plan for discharge:    Lantus 30 unit(s) BID (0800 and 2000)  Novolog 1 unit(s) per 7 g cho meals and snacks or return to fixed doses if that is preferred (9 units with meals of 65 - 75 grams chos) or 4 unit(s) for smaller meals/snacks of 30 grams chos    Novolog high resistance sliding scale insulin TID AC and HS    Correction Scale -  HIGH INSULIN RESISTANCE DOSING      Do Not give Correction Insulin if Pre-Meal BG less than 140.    For Pre-Meal  - 164 give 1 unit.    For Pre-Meal  - 189 give 2 units.    For Pre-Meal  - 214 give 3 units.    For Pre-Meal  - 239 give 4 units.    For Pre-Meal  - 264 give 5 units.    For Pre-Meal  - 289 give 6 units.    For Pre-Meal  - 314 give 7 units.    For Pre-Meal  - 339 give 8 units.    For Pre-Meal  - 364 give 9 units.    For Pre-Meal BG greater than or equal to 365 give 10 units   To be given with prandial insulin, and based on pre-meal blood glucose.      HIGH INSULIN RESISTANCE DOSING     Do Not give Bedtime Correction Insulin if BG less than 200.    For  - 224 give 1 units.    For  - 249 give 2 units.    For  - 274 give 3 units.    For  - 299 give 4 units.    For  - 324 give 5 units.    For  - 349 give 6 units.    For BG greater than or equal to 350 give 7 units.      Plan discussed bedside RN/primary team via this note.         Interval History:     The last 24 hours progress and nursing notes reviewed.      BG trend:    Fasting  mg/dL - stable.           Higher reading at HS appears to be directly related to a post prandial BG reading.   Do not check BG within a short time interval after eating OR disregard these readings. DO NOT correct post prandial BG readings as they often result in preciptious drops in BG.     Ate at 1902 - BG checked at 2142 = 327 mg/dL  No Bg since, there is an 0200 ordered.    Diet/intake restriction as needed     Diabetes to sign off     Planned Procedures/surgeries: none  D5W-containing solutions/medications: none    Recent Labs   Lab 09/22/23  2142 09/22/23  1824 09/22/23  1413 09/22/23  1228 09/22/23  0813 09/21/23  2124   * 197* 217* 155* 168* 267*         Nutrition:     Orders Placed This Encounter      Regular Diet Adult        PTA Regimen:     Lantus 33 units  bid  Humalog 8 units tid with meals   Humalog 3 units for every 50 mg/dl over 150  Trulicity 3 mg every 7 days          Review of Systems:   CC: none          Medications:   Steroid planning:           Physical Exam:   BP (!) 160/83   Pulse 76   Temp 98.9  F (37.2  C) (Oral)   Resp 16   Ht 1.524 m (5')   Wt 110.5 kg (243 lb 9.6 oz)   LMP 01/06/2015 (Exact Date)   SpO2 100%   BMI 47.57 kg/m           Data:     Lab Results   Component Value Date    A1C 7.0 09/21/2023    A1C 6.7 06/20/2023    A1C 6.5 03/14/2023    A1C 6.4 12/12/2022    A1C 7.4 08/17/2022    A1C 9.1 12/01/2020    A1C 9.7 09/15/2020    A1C 8.6 06/30/2020    A1C 6.2 12/03/2019    A1C 6.6 08/06/2019            CBC RESULTS:   Recent Labs   Lab Test 09/20/23  0016   WBC 8.3   RBC 3.04*   HGB 10.0*   HCT 31.2*   *   MCH 32.9   MCHC 32.1   RDW 13.1        Recent Labs   Lab Test 09/23/23  0820 09/22/23  2142 09/20/23  0706 09/20/23  0016 09/18/23  2303 08/17/23  0049   NA  --   --   --  136  --  138   POTASSIUM  --   --   --  4.9  --  3.9   CHLORIDE  --   --   --  101  --  100   CO2  --   --   --  22  --  26   ANIONGAP  --   --   --  13  --  12   * 327*   < > 200*   < > 177*   BUN  --   --   --  32.0*  --  15.0   CR  --   --   --  1.30*  --  1.13*   ALLEN  --   --   --  9.2  --  9.1    < > = values in this interval not displayed.     Liver Function Studies -   Recent Labs   Lab Test 09/20/23  0016   PROTTOTAL 6.4   ALBUMIN 3.9   BILITOTAL 0.2   ALKPHOS 63   AST 13   ALT 26     Lab Results   Component Value Date    INR 0.88 09/27/2022    INR  11/11/2016     Unsatisfactory specimen - tube underfilled  SPOKE WITH DR VARELA [id number], LY      INR 0.86 08/29/2016    INR 1.02 05/29/2014    INR 0.87 06/19/2011    INR 0.98 05/07/2009     Violetta Patel, ART CNP, BC-ADM  Inpatient Diabetes Management Service  Pager - [phone]  Available on Vocera     To contact Endocrine Diabetes service:   From 7AM-5PM: page inpatient diabetes  provider who is following the patient that day (see filed or incomplete progress notes/consult notes).  If uncertain of provider assignment: page job code [phone]. (To page job code in-house dial 3 stars, [phone] then enter number).  For questions or updates AFTER HOURS from 5PM-7AM: page the diabetes job code for on call fellow: [phone]    Please notify inpatient diabetes service if changes are planned to steroids, nutrition, or if procedures are planned requiring prolonged NPO status. Diabetes Management Team job code: [phone]    I spent a total of 25 minutes on the date of the encounter doing prep/post-work, chart review, history and exam, documentation and further activities per the note including lab review, multidisciplinary communication, counseling the patient and/or coordinating care regarding acute hyper/hypoglycemic management, as well as discharge management and planning/communication.  See note for details.

## 2023-09-23 NOTE — PLAN OF CARE
Pt was visible in the milieu most of the evening watching TV. Pt presents with flat affect but brightened up on assessment.  Reported back and rt leg pain. Stated pain gets worst with movement.Rated pain at 5/10.  Scheduled tylenol given. Pt verbalized effectiveness. Pt did not use a wheelchair this shift. Compliant with medication. Denies SI, HI, SIB, depression, visual or auditory hallucination. Contracted for safety. Pt is on carb count. Pt is voluntary.  On SIO and fall precaution. Adequate Po intake. No behavior concerns noted. No safety concerns noted.  Problem: Psychotic Signs/Symptoms  Goal: Improved Psychomotor Symptoms (Psychotic Signs/Symptoms)  Intervention: Manage Psychomotor Movement  Recent Flowsheet Documentation  Taken 9/22/2023 1748 by Belen Arvizu, RN  Patient Performed Hygiene:   dressed   undressed  Activity (Behavioral Health):   activity encouraged   up ad lito     P  Problem: Suicidal Behavior  Goal: Suicidal Behavior is Absent or Managed  Outcome: Progressing    patient and family education   Goal Outcome Evaluation:

## 2023-09-23 NOTE — CONSULTS
Internal Medicine Progress Note      Assessment and plan:  Nena Tang is a 63 year old female with a history of CAD, takotsuba cardiomyopathy, HLD, HTN, CINDY, type 2 diabetes mellitus, IBS, obesity, migraines, polysubstance abuse, schizoaffective disorder, auditory hallucinations.  Admitted 9/20/23 to 20 in for suicidal ideation.     #Fall  #B/l LE weakness  Patient initially seen March 2023 after a fall at which time her legs gave out.  Had frequent falls following that through May.  Fell 9/22/2023-states that she felt dizzy when going to sit down on a chair and missed the chair and went down on the ground.  She landed on her butt, denies hitting her head.  After that she was not able to get up.  This description is identical to complaints before and after previous falls.  She has had an extensive work-up for inflammatory markers.  Rheumatology consulted this admission and full consult note on 9/21.  She was started on steroids 6/8/2023.  Initial dose 20 mg prednisone daily.  7/11/2023 prednisone decreased to 15 mg daily.  Rheumatology would like to discontinue prednisone therefore she is being tapered off.  Noted she may have some adrenal insufficiency.  Plan for further work-up per rheumatology once she is off steroids.  -UA/UC  -CK total  -Continue steroid taper  -Contact rheumatology if weakness is ongoing  -Follow-up with rheumatology at discharge as outpatient    #Migraine  Persistent migraine pain, currently rates it 6/10.  States Tylenol is not helping with pain.  Received a dose of sumatriptan with no relief.  -Neurology consulted  -Increase scheduled Tylenol to 1G 3 times daily    #Macrocytic anemia  #Anemia of chronic disease vs anemia of inflammation  Hemoglobin 10,  on 9/20/2023.  Iron studies in the past have been normal.  Vitamin B12 and folate normal.  No signs of bleeding.  -CBC with platelets and differential-remains stable  -Reticulocyte count show hypoproliferative anemia 2/2 CKD,  inflammatory disease        Objective:  BP (!) 160/83   Pulse 76   Temp 98.9  F (37.2  C) (Oral)   Resp 16   Ht 1.524 m (5')   Wt 110.5 kg (243 lb 9.6 oz)   LMP 2015 (Exact Date)   SpO2 100%   BMI 47.57 kg/m      Vitals signs reviewed and noted    GENERAL: Alert and oriented x3  HEENT: Anicteric sclera. MMM  CV: RRR, S1, S2. No murmur noted  RESPIRATORY: Effort normal on RA. Lungs CTAB with no wheezing or rales  GI: Abdomen soft, non-tender with active bowel sounds. No rebound or guarding  NEUROLOGICAL: No focal deficits. Moves all extremities.  Migraine  EXTREMITIES: No peripheral edema. Intact bilateral pedal pulses bilateral lower extremity weakness.  SKIN: No jaundice, no rash    Pertinent labs and procedures were reviewed     Subjective:     Met with patient in milieu, calm and cooperative for assessment.  Discussed fall from yesterday.  States she gets dizzy and her legs will get very weak and give out from under her.  Discussed prednisone taper and further rheumatology work-up to follow.  States she still has a headache rated 6/10.  Tylenol and the sumatriptan did not help.  Patient is unable to recall if she has ever tried anything else for headache pain in the past.  Denies any other new symptoms.    ART Mccarty CNP  Internal Medicine [facility] Hospitalist  Page job code [id number] (3B), [phone] (3A), or [phone] ([facility] and )  Text paging via [employer] is appreciated  2023    Medical Decision Makin MINUTES SPENT BY ME on the date of service doing chart review, history, exam, documentation & further activities per the note.

## 2023-09-23 NOTE — CONSULTS
"[facility]  Neurology Consultation    Patient Name:  Nena Tang  MRN:  [id number]    :  1960  Date of Service:  2023  Primary care provider:  Albino Rainey      Neurology consultation service was asked to see Nena Tang by Nikole Newell to evaluate for headache.    Chief Complaint:  Headache    History of Present Illness:   Nena Tang is a 63 year old female with history of schizoaffective disorder, generalized anxiety disorder, bipolar and PTSD who presents with depression with suicidal thoughts.  She describes that she has \"lost a lot of people\" and a recent amount of time, including the anniversary of the death of some loved ones.  She presented to the ED  with thoughts of cutting herself.  She lives in a group home for reasons related to her mental health and also for patients who have cognitive impairment (unclear if this is also the case for our patient).  Neurology was consulted to help manage her headaches.    I searched for outpatient notes from neurology which I do not see, however, there are pain medicine notes from 2021 describing her treatment with gabapentin 300 nightly and Topamax 200 daily for musculoskeletal pain.  There is also a note from 10/2013 for [facility] inpatient management by Dr. Mann regarding chronic musculoskeletal pain of the shoulder and back and at remote history of opioid use disorder (which can predispose to hyperalgesia).  Review of her current home medications include magnesium to 50 mg at night, though I do not see any other pain medications or headache/migraine specific medications.    On interview Ms. Tang tells me that she thinks her headache started yesterday and that she has not had headaches in the past.  She denies migraine headaches or other types.  This headache is bifrontal, and she tells me this started yesterday.  This is not associated with any blurred vision, " double vision, or eye pain.  She says that it feels better when she is lying down, and that today it is better than it was yesterday.  She was able to sleep, and the pain did not awaken her from sleep.  She is not able to tell me any medications her pain regimen that is helped headache in the past, since she describes this as a relatively new symptom.    ROS  A comprehensive ROS was performed and pertinent findings were included in HPI.     PMH  Past Medical History:   Diagnosis Date    Acute respiratory failure with hypoxia (H) 9/4/2017    CAD (coronary artery disease)     5/2014 cath, nonbostructive stenosis to LAD, RCA.    Chronic low back pain 1/22/2013    Cocaine abuse, in remission (H)     Fecal urgency 3/8/2012    History of heroin abuse (H)     Hyperlipidemia LDL goal <100 10/31/2010    Hypertension 7/29/2013    Illiterate 8/30/2011    Irritable bowel syndrome     Left cataract     Migraine 4/19/2012    Migraine headache 4/22/2013    Moderate major depression (H) 6/8/2011    Noncompliance with medication regimen 6/8/2011    Obesity     CINDY (obstructive sleep apnea) 3/8/2012    uses CPAP    CINDY (obstructive sleep apnea)- mild AHI 10.3     Osteopenia 10/7/2009    Pneumonia of right lower lobe due to infectious organism 9/4/2017    Schizoaffective disorder, depressive type (H) 2/25/2013    Sepsis (H) 8/29/2017    Suicidal intent 10/2/2013    Takotsubo cardiomyopathy     Type 2 diabetes mellitus (H) 8/30/2011    Uterine cancer (H) 1983    Verbal auditory hallucination 10/4/2012     Past Surgical History:   Procedure Laterality Date    CATARACT IOL, RT/LT Bilateral 2017    CHOLECYSTECTOMY      COLONOSCOPY N/A 3/16/2017    Procedure: COLONOSCOPY;  Surgeon: Traci Gonzalez MD;  Location: [facility]    Coronary CTA  5/21/2014    HYSTERECTOMY  1983    uterine cancer yearly pap's per provider.    HYSTERECTOMY      LAPAROSCOPIC CHOLECYSTECTOMY  2008    PHACOEMULSIFICATION CLEAR CORNEA WITH STANDARD INTRAOCULAR  LENS IMPLANT Left 5/5/2017    Procedure: PHACOEMULSIFICATION CLEAR CORNEA WITH STANDARD INTRAOCULAR LENS IMPLANT;  LEFT EYE PHACOEMULSIFICATION CLEAR CORNEA WITH STANDARD INTRAOCULAR LENS IMPLANT ;  Surgeon: Tyra Diaz MD;  Location:  [facility]    PHACOEMULSIFICATION CLEAR CORNEA WITH STANDARD INTRAOCULAR LENS IMPLANT Right 6/30/2017    Procedure: PHACOEMULSIFICATION CLEAR CORNEA WITH STANDARD INTRAOCULAR LENS IMPLANT;  RIGHT EYE PHACOEMULSIFICATION CLEAR CORNEA WITH STANDARD INTRAOCULAR LENS IMPLANT;  Surgeon: Tyra Diaz MD;  Location:  [facility]    RELEASE TRIGGER FINGER  10/11/2012    Left thumb. Procedure: RELEASE TRIGGER FINGER;  LEFT THUMB TRIGGER RELEASE;  Surgeon: Tay Langley MD;  Location:  [facility]    RELEASE TRIGGER FINGER Right 12/26/2016    Procedure: RELEASE TRIGGER FINGER;  Surgeon: Albino Castañeda MD;  Location: [facility] OOPHORECTORMY FOR RAHEL, W/BX  1983    UTERINE       Medications   I have personally reviewed the patient's medication list.     Allergies  I have personally reviewed the patient's allergy list.     Social History  Social History     Socioeconomic History    Marital status:      Spouse name: Not on file    Number of children: 2    Years of education: Not on file    Highest education level: Not on file   Occupational History    Not on file   Tobacco Use    Smoking status: Never    Smokeless tobacco: Never   Vaping Use    Vaping Use: Never used   Substance and Sexual Activity    Alcohol use: Not Currently     Comment: when younger    Drug use: No     Comment: history of    Sexual activity: Not Currently     Partners: Male     Birth control/protection: None, Condom   Other Topics Concern    Parent/sibling w/ CABG, MI or angioplasty before 65F 55M? Not Asked     Service No    Blood Transfusions No    Caffeine Concern No    Occupational Exposure No    Hobby Hazards No    Sleep Concern Yes    Stress Concern Yes    Weight Concern Yes    Special Diet Yes      Comment: DM    Back Care Yes    Exercise Yes     Comment: WALKS DAILY    Bike Helmet Not Asked    Seat Belt Yes    Self-Exams No     Comment: ENCOURAGED   Social History Narrative     10/2014. Has 2 sons. 7 grandchildren.         Unemployed. Graduated HS.         Tobacco use: Denies    Alcohol use: Escalated use since   10/2014    Drug: Denies     Social Determinants of Health     Financial Resource Strain: Not on file   Food Insecurity: Not on file   Transportation Needs: Not on file   Physical Activity: Not on file   Stress: Not on file   Social Connections: Not on file   Interpersonal Safety: Not At Risk (2022)    Humiliation, Afraid, Rape, and Kick questionnaire     Fear of Current or Ex-Partner: No     Emotionally Abused: No     Physically Abused: No     Sexually Abused: No   Housing Stability: Not on file        Family History    Family History   Problem Relation Age of Onset    Cancer Mother         BLADDER    Respiratory Mother         COPD    Gastrointestinal Disease Mother         CIRRHOSIS OF LI BOLIVAR    Alcohol/Drug Mother     Diabetes Mother     Hypertension Mother     Lipids Mother     C.A.D. Mother     Glaucoma Mother     Alcohol/Drug Sister     Mental Illness Sister     Alcohol/Drug Sister     Psychotic Disorder Sister     Cancer Maternal Grandmother         UNKNOWN TYPE    Cancer Brother         COLON    Cancer - colorectal Brother         IN HIS LATE 30S    Alcohol/Drug Brother          OF HEROIN OVERDOSE AT AGE 22 YRS    Macular Degeneration No family hx of           Physical Examination   Vitals: BP (!) 160/83   Pulse 76   Temp 98.9  F (37.2  C) (Oral)   Resp 16   Ht 1.524 m (5')   Wt 110.5 kg (243 lb 9.6 oz)   LMP 2015 (Exact Date)   SpO2 100%   BMI 47.57 kg/m    General: Sitting in chair watching TV, NAD  Head: NC/AT  Eyes: no icterus, op pink and moist  Cardiac: RRR. Extremities warm, no edema.   Respiratory: non-labored on RA  GI: S/NT/ND  Skin: No rash  or lesion on exposed skin  Neuro:  Mental status: Awake, alert, attentive, oriented to self, time, place, and circumstance.  Her affect seems blunted, she does not say very much, though is able to respond appropriately to all questions.  She is oriented to time, place, and situation.  Cranial nerves: VFF, PERRL, conjugate gaze, EOMI, facial sensation intact, face symmetric, shoulder shrug strong, tongue/uvula midline, no dysarthria.   Motor: Normal bulk and tone. No abnormal movements. 5/5 strength bilaterally in deltoids, biceps, triceps, hand , hip flexors, hip extensors, knee flexion, knee extension, plantarflexion, dorsiflexion.   Reflexes: Hyporeflexic throughout, no clonus, toes are downgoing.  Sensory: Intact to light touch in proximal and distal aspects of all 4 extremities   Coordination: There is global bradycardia kinesis throughout though symmetrical.  Finger-nose-finger is slowed, finger tapping is slowed with decrement bilaterally.  Gait: Able to walk independently without any assistive devices, gait is somewhat slow though otherwise appears normal without ataxia.    Investigations   I have personally reviewed pertinent labs, tests, and radiological imaging. Discussion of notable findings is included under Impression.     Was patient transferred from outside hospital?   No    Impression  #Headache, tension type  #Parkinsonism, suspected drug-induced  MsRichar Tang presented to inpatient psychiatry with worsening depression and suicidal ideation who concurrently has a headache for which neurology was consulted.  According to her, his headache is relatively new, bifrontal, and only improving when compared to yesterday.  It does not have features particularly characteristic of a migraine headache, though her chart does document a history of migraine headaches.  This is somewhat of a surprise, since she tells me she does not have a history of headaches, so unfortunately I am not able to evaluate what has  worked for her in the past.  The character of this headache, released on my interview, seems more consistent with a tension type headache.  She has told me that her urine is quite dark, and I suspect that the dehydration can be contributing, so I encouraged drinking sufficient water throughout the day.  We will also continue with her acetaminophen 3 times daily (975 for 1000) at least in the short-term.  Using this in the long-term may lead to medication rebound headache.    If in fact she does have frequent headaches more than half of the month, my neck step would be to start a prophylactic medication such as propanolol.  With her hypertension, and normal heart rate, without asthma, I do not see any contraindications to starting propanolol.    Recommendations  -Agree with acetaminophen 975 TID, though not for greater than 2 weeks  - Drink water throughout the day (4-8 glasses daily) until urine is clear  - Continue magnesium 250 mg at bedtime  - If headache is persistent more than half the month, could consider starting propanolol 40 mg daily, with upward titration to 40 BID or 80 mg extended release    Thank you for involving Neurology in the care of Nena TINY Kitty.  Please do not hesitate to call with questions.     Carlo Conroy MD     I spent 70 minutes on this consultation including chart review, interview and examination of patient, documentation, review of labs, review of past medications and current.

## 2023-09-24 LAB
BACTERIA UR CULT: ABNORMAL
GLUCOSE BLDC GLUCOMTR-MCNC: 149 MG/DL (ref 70–99)
GLUCOSE BLDC GLUCOMTR-MCNC: 323 MG/DL (ref 70–99)
GLUCOSE BLDC GLUCOMTR-MCNC: 328 MG/DL (ref 70–99)
GLUCOSE BLDC GLUCOMTR-MCNC: 93 MG/DL (ref 70–99)

## 2023-09-24 PROCEDURE — 250N000013 HC RX MED GY IP 250 OP 250 PS 637

## 2023-09-24 PROCEDURE — 99232 SBSQ HOSP IP/OBS MODERATE 35: CPT

## 2023-09-24 PROCEDURE — 250N000012 HC RX MED GY IP 250 OP 636 PS 637: Performed by: PHYSICIAN ASSISTANT

## 2023-09-24 PROCEDURE — 250N000012 HC RX MED GY IP 250 OP 636 PS 637

## 2023-09-24 PROCEDURE — 124N000002 HC R&B MH UMMC

## 2023-09-24 RX ORDER — SULFAMETHOXAZOLE/TRIMETHOPRIM 800-160 MG
1 TABLET ORAL 2 TIMES DAILY
Status: COMPLETED | OUTPATIENT
Start: 2023-09-24 | End: 2023-09-27

## 2023-09-24 RX ADMIN — DOCUSATE SODIUM 100 MG: 100 CAPSULE, LIQUID FILLED ORAL at 08:27

## 2023-09-24 RX ADMIN — ATORVASTATIN CALCIUM 80 MG: 80 TABLET, FILM COATED ORAL at 20:16

## 2023-09-24 RX ADMIN — QUETIAPINE FUMARATE 125 MG: 100 TABLET ORAL at 20:16

## 2023-09-24 RX ADMIN — ACETAMINOPHEN 1000 MG: 500 TABLET ORAL at 20:14

## 2023-09-24 RX ADMIN — LIDOCAINE PATCH 4% 1 PATCH: 40 PATCH TOPICAL at 08:26

## 2023-09-24 RX ADMIN — MIRABEGRON 50 MG: 25 TABLET, FILM COATED, EXTENDED RELEASE ORAL at 08:28

## 2023-09-24 RX ADMIN — INSULIN GLARGINE 30 UNITS: 100 INJECTION, SOLUTION SUBCUTANEOUS at 20:58

## 2023-09-24 RX ADMIN — LOSARTAN POTASSIUM 100 MG: 25 TABLET, FILM COATED ORAL at 08:26

## 2023-09-24 RX ADMIN — ESCITALOPRAM OXALATE 20 MG: 10 TABLET ORAL at 08:28

## 2023-09-24 RX ADMIN — INSULIN GLARGINE 30 UNITS: 100 INJECTION, SOLUTION SUBCUTANEOUS at 08:49

## 2023-09-24 RX ADMIN — ACETAMINOPHEN 1000 MG: 500 TABLET ORAL at 08:27

## 2023-09-24 RX ADMIN — METOPROLOL SUCCINATE 100 MG: 25 TABLET, FILM COATED, EXTENDED RELEASE ORAL at 20:15

## 2023-09-24 RX ADMIN — METOPROLOL SUCCINATE 50 MG: 25 TABLET, FILM COATED, EXTENDED RELEASE ORAL at 08:27

## 2023-09-24 RX ADMIN — PREDNISONE 12.5 MG: 10 TABLET ORAL at 08:27

## 2023-09-24 RX ADMIN — PANTOPRAZOLE SODIUM 40 MG: 40 TABLET, DELAYED RELEASE ORAL at 08:28

## 2023-09-24 RX ADMIN — ACETAMINOPHEN 1000 MG: 500 TABLET ORAL at 13:54

## 2023-09-24 RX ADMIN — TRAZODONE HYDROCHLORIDE 100 MG: 100 TABLET ORAL at 20:16

## 2023-09-24 RX ADMIN — SULFAMETHOXAZOLE AND TRIMETHOPRIM 1 TABLET: 800; 160 TABLET ORAL at 20:39

## 2023-09-24 RX ADMIN — AMANTADINE HYDROCHLORIDE 100 MG: 100 CAPSULE ORAL at 08:28

## 2023-09-24 RX ADMIN — Medication 250 MG: at 20:15

## 2023-09-24 RX ADMIN — DOCUSATE SODIUM 100 MG: 100 CAPSULE, LIQUID FILLED ORAL at 20:15

## 2023-09-24 RX ADMIN — SENNOSIDES AND DOCUSATE SODIUM 1 TABLET: 50; 8.6 TABLET ORAL at 20:15

## 2023-09-24 RX ADMIN — ASPIRIN 81 MG: 81 TABLET, COATED ORAL at 08:27

## 2023-09-24 RX ADMIN — PALIPERIDONE 12 MG: 3 TABLET, EXTENDED RELEASE ORAL at 20:14

## 2023-09-24 RX ADMIN — DICLOFENAC SODIUM 4 G: 10 GEL TOPICAL at 20:42

## 2023-09-24 RX ADMIN — Medication 600 MG: at 08:27

## 2023-09-24 RX ADMIN — INSULIN ASPART 6 UNITS: 100 INJECTION, SOLUTION INTRAVENOUS; SUBCUTANEOUS at 21:05

## 2023-09-24 RX ADMIN — GABAPENTIN 300 MG: 300 CAPSULE ORAL at 20:16

## 2023-09-24 ASSESSMENT — ACTIVITIES OF DAILY LIVING (ADL)
ORAL_HYGIENE: PROMPTS;INDEPENDENT
ADLS_ACUITY_SCORE: 49
HYGIENE/GROOMING: HANDWASHING;INDEPENDENT
ADLS_ACUITY_SCORE: 49
DRESS: SCRUBS (BEHAVIORAL HEALTH);INDEPENDENT;PROMPTS
ADLS_ACUITY_SCORE: 49
ADLS_ACUITY_SCORE: 49
ADLS_ACUITY_SCORE: 59
ORAL_HYGIENE: INDEPENDENT;PROMPTS
ADLS_ACUITY_SCORE: 49
ADLS_ACUITY_SCORE: 59
ADLS_ACUITY_SCORE: 49
HYGIENE/GROOMING: HANDWASHING;SHOWER;INDEPENDENT
LAUNDRY: UNABLE TO COMPLETE
DRESS: SCRUBS (BEHAVIORAL HEALTH);INDEPENDENT
LAUNDRY: UNABLE TO COMPLETE
ADLS_ACUITY_SCORE: 59
ADLS_ACUITY_SCORE: 59

## 2023-09-24 NOTE — PROGRESS NOTES
Internal Medicine Progress Note      Assessment and plan:  Nena Tang is a 63 year old female with a history of CAD, takotsuba cardiomyopathy, HLD, HTN, CINDY, type 2 diabetes mellitus, IBS, obesity, migraines, polysubstance abuse, schizoaffective disorder, auditory hallucinations.  Admitted 9/20/23 to 20 in for suicidal ideation.     #Fall  #B/l LE weakness  Patient initially seen March 2023 after a fall at which time her legs gave out.  Had frequent falls following that through May.  Fell 9/22/2023-states that she felt dizzy when going to sit down on a chair and missed the chair and went down on the ground.  She landed on her butt, denies hitting her head.  After that she was not able to get up.  This description is identical to complaints before and after previous falls.  She has had an extensive work-up for inflammatory markers.  Rheumatology consulted this admission and full consult note on 9/21.  She was started on steroids 6/8/2023.  Initial dose 20 mg prednisone daily.  7/11/2023 prednisone decreased to 15 mg daily.  Rheumatology would like to discontinue prednisone therefore she is being tapered off.  Noted she may have some adrenal insufficiency.  Plan for further work-up per rheumatology once she is off steroids. CK total normal.   -Continue steroid taper  -Contact rheumatology if weakness is ongoing  -Follow-up with rheumatology at discharge as outpatient    #Cystitis  UA obtained as part of work up for fall and leg weakness. Urine culture + for gram negative bacilli. Flouroquinolones not recommended for pt with diabetes.   -Bactrim DS BID for 3 days  -BMP Tuesday    #Tension headache  Persistent headache pain. Initially thought was a migraine. Neurology evaluation feels it is more in line with tension headache. Received a dose of sumatriptan with no relief.  -Neurology consulted- note 9/23  -Increase scheduled Tylenol to 1G 3 times daily-continue for 2 weeks and then wean  -Increase water intake to  4-8 glasses daily  -Continue magnesium 250 mg at bedtime  -If headache is persistent more than half the month, could consider starting propanolol 40 mg daily, with upward titration to 40 BID or 80 mg extended release    #Diabetes mellitus- type 2  -Endocrine following, appreciate recs    #Hypertension  #CAD  #Hyperlipidemia  EKG normal sinus rhythm.  Denies chest pain, dyspnea on exertion, orthopnea, headache, blurry vision, lightheaded, dizziness.  Labile blood pressures since admission.  We will continue to monitor before making any changes.  -Continue metoprolol 50 mg in a.m. and 100 mg at bedtime  -Continue losartan 100 mg daily  -Continue aspirin EC 81 mg daily  -Continue PTA atorvastatin    #Macrocytic anemia  #Anemia of chronic disease vs anemia of inflammation  Hemoglobin 10,  on 9/20/2023.  Iron studies in the past have been normal.  Vitamin B12 and folate normal.  No signs of bleeding.  -CBC with platelets and differential-remains stable  -Reticulocyte count show hypoproliferative anemia 2/2 CKD, inflammatory disease        Objective:  BP (!) 185/94   Pulse 69   Temp 97.6  F (36.4  C) (Oral)   Resp 16   Ht 1.524 m (5')   Wt 110.5 kg (243 lb 9.6 oz)   LMP 01/06/2015 (Exact Date)   SpO2 94%   BMI 47.57 kg/m      Vitals signs reviewed and noted    GENERAL: Alert and oriented x3  HEENT: Anicteric sclera. MMM  CV: RRR, S1, S2. No murmur noted  RESPIRATORY: Effort normal on RA. Lungs CTAB with no wheezing or rales  GI: Abdomen soft, non-tender with active bowel sounds. No rebound or guarding  NEUROLOGICAL: No focal deficits. Moves all extremities.  Migraine  EXTREMITIES: No peripheral edema. Intact bilateral pedal pulses bilateral lower extremity weakness.  SKIN: No jaundice, no rash    Pertinent labs and procedures were reviewed       ART Mccarty CNP  Internal Medicine [facility] Hospitalist  Page job code [id number] (3B), 0670 (3A), or [id number] ([facility] and 4A)  Text paging via [employer] is  appreciated  2023    Medical Decision Makin MINUTES SPENT BY ME on the date of service doing chart review, history, exam, documentation & further activities per the note.

## 2023-09-24 NOTE — PLAN OF CARE
"Goal Outcome Evaluation:    Plan of Care Reviewed With: patient               Problem: Plan of Care - These are the overarching goals to be used throughout the patient stay.    Goal: Plan of Care Review  Description: The Plan of Care Review/Shift note should be completed every shift.  The Outcome Evaluation is a brief statement about your assessment that the patient is improving, declining, or no change.  This information will be displayed automatically on your shift note.  Outcome: Progressing     Pt BG this morning 93 and lunch 149. Coverage was given. Pt was up and visible in the milieu majority of the shift. However, she appears sleepy so she went to nap after breakfast. Pt is isolative and withdrawn from others. Pt denies SI/HI/SIB and hallucinations. Pt is contract for safety. Endorses \"some\" anxiety and depression. Pt is voluntary. On SI and fall precaution. Safety was maintained throughout the shift. No medications side effects observed or reported. Pt need to be reminded to use the toilet Q2H.  Good intake of food and fluids. Ate 100% for breakfast and lunch. Pt on carb counting. Pt has a new order for Bactrim DS BID x 3 days due to abnormal UA/UC results. Repeat BMP on Tuesday, 9/26.  Blood pressure (!) 185/94, pulse 69, temperature 96.9  F (36.1  C), temperature source Oral, resp. rate 16, height 1.524 m (5'), weight 110.5 kg (243 lb 9.6 oz), last menstrual period 01/06/2015, SpO2 94 %, not currently breastfeeding.     Standing /84, HR 75  "

## 2023-09-24 NOTE — PLAN OF CARE
No PRNs given or requested this shift. Pt remained in her room the entire shift. No concerns noted this shift. Pt appears to have slept for  7 hours. Will continue to monitor and offer support.     Problem: Sleep Disturbance  Goal: Adequate Sleep/Rest  Outcome: Progressing   Goal Outcome Evaluation:

## 2023-09-25 LAB
GLUCOSE BLDC GLUCOMTR-MCNC: 107 MG/DL (ref 70–99)
GLUCOSE BLDC GLUCOMTR-MCNC: 182 MG/DL (ref 70–99)
GLUCOSE BLDC GLUCOMTR-MCNC: 273 MG/DL (ref 70–99)
GLUCOSE BLDC GLUCOMTR-MCNC: 298 MG/DL (ref 70–99)

## 2023-09-25 PROCEDURE — 250N000013 HC RX MED GY IP 250 OP 250 PS 637

## 2023-09-25 PROCEDURE — 99232 SBSQ HOSP IP/OBS MODERATE 35: CPT | Mod: GC | Performed by: PSYCHIATRY & NEUROLOGY

## 2023-09-25 PROCEDURE — 90832 PSYTX W PT 30 MINUTES: CPT

## 2023-09-25 PROCEDURE — G0177 OPPS/PHP; TRAIN & EDUC SERV: HCPCS

## 2023-09-25 PROCEDURE — 124N000002 HC R&B MH UMMC

## 2023-09-25 PROCEDURE — 250N000012 HC RX MED GY IP 250 OP 636 PS 637

## 2023-09-25 RX ADMIN — PALIPERIDONE 12 MG: 3 TABLET, EXTENDED RELEASE ORAL at 20:33

## 2023-09-25 RX ADMIN — ATORVASTATIN CALCIUM 80 MG: 80 TABLET, FILM COATED ORAL at 20:33

## 2023-09-25 RX ADMIN — INSULIN GLARGINE 30 UNITS: 100 INJECTION, SOLUTION SUBCUTANEOUS at 21:08

## 2023-09-25 RX ADMIN — SENNOSIDES AND DOCUSATE SODIUM 1 TABLET: 50; 8.6 TABLET ORAL at 20:33

## 2023-09-25 RX ADMIN — METOPROLOL SUCCINATE 100 MG: 25 TABLET, FILM COATED, EXTENDED RELEASE ORAL at 20:32

## 2023-09-25 RX ADMIN — SULFAMETHOXAZOLE AND TRIMETHOPRIM 1 TABLET: 800; 160 TABLET ORAL at 20:34

## 2023-09-25 RX ADMIN — ACETAMINOPHEN 1000 MG: 500 TABLET ORAL at 20:33

## 2023-09-25 RX ADMIN — ESCITALOPRAM OXALATE 20 MG: 10 TABLET ORAL at 08:15

## 2023-09-25 RX ADMIN — ACETAMINOPHEN 1000 MG: 500 TABLET ORAL at 08:14

## 2023-09-25 RX ADMIN — TRAZODONE HYDROCHLORIDE 100 MG: 100 TABLET ORAL at 20:33

## 2023-09-25 RX ADMIN — Medication 600 MG: at 08:15

## 2023-09-25 RX ADMIN — MIRABEGRON 50 MG: 25 TABLET, FILM COATED, EXTENDED RELEASE ORAL at 08:15

## 2023-09-25 RX ADMIN — INSULIN ASPART 3 UNITS: 100 INJECTION, SOLUTION INTRAVENOUS; SUBCUTANEOUS at 21:09

## 2023-09-25 RX ADMIN — LIDOCAINE PATCH 4% 1 PATCH: 40 PATCH TOPICAL at 08:16

## 2023-09-25 RX ADMIN — ASPIRIN 81 MG: 81 TABLET, COATED ORAL at 08:15

## 2023-09-25 RX ADMIN — INSULIN GLARGINE 30 UNITS: 100 INJECTION, SOLUTION SUBCUTANEOUS at 09:18

## 2023-09-25 RX ADMIN — AMANTADINE HYDROCHLORIDE 100 MG: 100 CAPSULE ORAL at 08:15

## 2023-09-25 RX ADMIN — Medication 250 MG: at 20:33

## 2023-09-25 RX ADMIN — DOCUSATE SODIUM 100 MG: 100 CAPSULE, LIQUID FILLED ORAL at 20:33

## 2023-09-25 RX ADMIN — QUETIAPINE FUMARATE 125 MG: 100 TABLET ORAL at 20:33

## 2023-09-25 RX ADMIN — PANTOPRAZOLE SODIUM 40 MG: 40 TABLET, DELAYED RELEASE ORAL at 08:15

## 2023-09-25 RX ADMIN — SULFAMETHOXAZOLE AND TRIMETHOPRIM 1 TABLET: 800; 160 TABLET ORAL at 08:15

## 2023-09-25 RX ADMIN — GABAPENTIN 300 MG: 300 CAPSULE ORAL at 20:34

## 2023-09-25 RX ADMIN — DICLOFENAC SODIUM 4 G: 10 GEL TOPICAL at 20:47

## 2023-09-25 RX ADMIN — LOSARTAN POTASSIUM 100 MG: 25 TABLET, FILM COATED ORAL at 08:15

## 2023-09-25 RX ADMIN — PREDNISONE 12.5 MG: 10 TABLET ORAL at 08:15

## 2023-09-25 RX ADMIN — DOCUSATE SODIUM 100 MG: 100 CAPSULE, LIQUID FILLED ORAL at 08:15

## 2023-09-25 RX ADMIN — ACETAMINOPHEN 1000 MG: 500 TABLET ORAL at 14:25

## 2023-09-25 ASSESSMENT — ACTIVITIES OF DAILY LIVING (ADL)
ORAL_HYGIENE: INDEPENDENT;PROMPTS
ADLS_ACUITY_SCORE: 49
ADLS_ACUITY_SCORE: 49
ADLS_ACUITY_SCORE: 69
LAUNDRY: UNABLE TO COMPLETE
ADLS_ACUITY_SCORE: 59
HYGIENE/GROOMING: HANDWASHING;SHOWER;INDEPENDENT;PROMPTS
ADLS_ACUITY_SCORE: 69
ADLS_ACUITY_SCORE: 49
ORAL_HYGIENE: INDEPENDENT;PROMPTS
DRESS: SCRUBS (BEHAVIORAL HEALTH);INDEPENDENT;PROMPTS;WITH ASSISTANCE
ADLS_ACUITY_SCORE: 49
ADLS_ACUITY_SCORE: 69
HYGIENE/GROOMING: HANDWASHING;SHOWER;INDEPENDENT
LAUNDRY: UNABLE TO COMPLETE
ADLS_ACUITY_SCORE: 49
DRESS: SCRUBS (BEHAVIORAL HEALTH);INDEPENDENT;PROMPTS

## 2023-09-25 NOTE — CARE PLAN
[facility] Occupational Therapy Group Intervention Note     09/25/23 1132   Group Therapy Session   Group Attendance attended group session   Total Time (minutes) 65   Group Type task skill;life skill;psychoeducation   Group Topic Covered balanced lifestyle;coping skills/lifestyle management;relaxation techniques   Group Session Detail 1) Chair Yoga. Purpose: Occupation-based coping skill exploration group through mindful movement to facilitate stress management, awakening and/or relaxation. Education was provided on the use of stretching, exercise, and meditation practice as a coping tool within daily/weekly routine.     2) Leisure exploration and engagement offered for increased intrinsic motivation to engage in social non-obligatory occupations, challenge new learning, sequencing, problem solving, and retention via a cognitive group game. Attendance: 20 minutes, no charge   Patient Response/Contribution cooperative with task;listened actively;discussed personal experience with topic   Patient Participation Detail 1) Independently followed and engaged in ~50% of guided exercises and stretches. Verbalized feeling positive results and increased mind-body connection at the end of group.    2) Congruent affect. Able to learn and follow novel game instructions. Pt was delayed with processing and needed additional time to play each turn. Pt needed occasional cues for attention to details, yet successful on 75% of opportunities. Reportedly enjoyed playing this game with peers. Excused early to meet with .      Tyra Edmonds OT on 9/25/2023 at 11:33 AM

## 2023-09-25 NOTE — PLAN OF CARE
"  Duration: Met with patient a for a total of 30 minutes.    Time start: 1315  Time end 1345    Modality Used:CBT, Rapport Building, and Motivational Interviewing    Goals: Learn to ignore voices in her head telling her to kill herself, process grief/loss of family members and .    Pt progress: Pt states she is doing \"much better\" than when she was first admitted. Pt reports her \"bad dreams\" and voices in her head are \"easing.\" Writer commended Pt on her progress and discussed strategies to change thinking. Pt reports while she is falling asleep she thinks of positive things, like her grandchildren. Writer asked how often Pt imagines family members as they were dying; Pt responded \"all day.\" Writer encouraged Pt to instead try positive imagery when thinking about them. Pt presented with flat affect, tearful, and engaged. Pt appreciative of session, made plan to meet again tomorrow.    Treatment Objective(s) Addressed:   The focus of this session was on rapport building, orienting the patient to therapy, and processing feelings related to grief and loss.      Progress Towards Goals:   Unable to assess progress towards goals - first meeting with Pt.      Therapeutic Intervention(s):   Provided active listening, unconditional positive regard, and validation. Provided positive reinforcement for progress towards goals and gains in knowledge. Explored making meaning out of loss.    Plan/next step: Writer/Pt made plan to meet tomorrow.      Monique Barraza [facility]  Psychotherapist       "

## 2023-09-25 NOTE — CONSULTS
"SPIRITUAL HEALTH SERVICES Consult Note  [facility], Unit  [address]  On-call    Saw pt Nena Tang per consult request.    Patient/Family Understanding of Illness and Goals of Care - Pt shared that she is \"going home soon\" but is \"scared about that, because of the voices, and the thoughts.\"     Distress and Loss - Pt shared her extensive family losses \"I've lost 6 people\". \"my mother  in my arms\", \"my  - I blame myself for his death. He didn't want to go to the doctor, but I made him, and then he had a stroke and  the next day.\" Pt has also lost several siblings.     Strengths, Coping, and Resources - Pt finds strength and ronal in her \"7 grandchildren\"(ages 20-9yrs old). She shared that \"that's why I gotta stay, gotta stick around, for them.\" We discussed the joys of grandchildren and her memories of her .      Pt shared that her  was \"a very good cook, the best. I never cooked.\" Pt shared that he always cooked for Thanksgiving and the house was always full of people who would come over to eat together. We discussed the joys of food and community and family.     Meaning, Beliefs, and Spirituality - Pt also enjoys prayer, but shared that \"I don't do that much anymore, with all these people dying.\" We discussed pt feeling \"angry with God\" but how God could take that and handle that anger, and how anger is a normal feeling when someone we love has .      I offered prayer, which pt accepted, we prayed for her family members, expressed gratitude for her grandchildren, and asked for gifts of courage, wisdom, and peace. Pt expressed gratitude for prayer saying, \"I needed that\".    Plan of Care - Will communicate care to unit chaplain Taylor. Spiritual health services remains available for any follow-up or requests.       Dulce Nix, [facility]  Associate   Pager: [phone]    "

## 2023-09-25 NOTE — PLAN OF CARE
"Goal Outcome Evaluation:    Plan of Care Reviewed With: patient          Problem: Plan of Care - These are the overarching goals to be used throughout the patient stay.    Goal: Plan of Care Review  Description: The Plan of Care Review/Shift note should be completed every shift.  The Outcome Evaluation is a brief statement about your assessment that the patient is improving, declining, or no change.  This information will be displayed automatically on your shift note.  9/24/2023 2001 by Ese Cosby RN  Outcome: Progressing    Pt BG this evening before supper 323 and . Pt on carb counting. Coverage was given. Pt was up and visible in the milieu majority of the shift. Pt is isolative and withdrawn from others. However, brighten upon approach. Pt denies SI/HI/SIB and hallucinations. Pt is contract for safety. Endorses \"some\" anxiety and depression. Pt is voluntary. On SI and fall precaution. Safety was maintained throughout the shift. No medications side effects observed or reported. Pt need to be reminded to use the toilet Q2H.  Good intake of food and fluids.  Hygiene is appropriate. Pt hair was done and she took a shower. Pt has a new order for Bactrim DS BID x 3 days due to abnormal UA/UC results. Repeat BMP on Tuesday, 9/26. Blood pressure (!) 147/85, pulse 79, temperature 99  F (37.2  C), temperature source Oral, resp. rate 16, height 1.524 m (5'), weight 110.5 kg (243 lb 9.6 oz), last menstrual period 01/06/2015, SpO2 97 %, not currently breastfeeding.           "

## 2023-09-25 NOTE — PLAN OF CARE
"Goal Outcome Evaluation:    Plan of Care Reviewed With: patient          Problem: Plan of Care - These are the overarching goals to be used throughout the patient stay.    Goal: Plan of Care Review  Description: The Plan of Care Review/Shift note should be completed every shift.  The Outcome Evaluation is a brief statement about your assessment that the patient is improving, declining, or no change.  This information will be displayed automatically on your shift note.  Outcome: Progressing        Pt BG this morning was 107.  Before lunch 182. Pt on carb counting. Coverage was given. Pt was up and visible in the milieu majority of the shift. Pt is isolative and withdrawn from others but more brighten upon approach. Pt denies SI/HI/SIB and hallucinations. Pt is contract for safety. Endorses \"some\" anxiety and depression. Due to some life stressor of losing the  5 months ago and other family members.  Pt was seen by the keith. Pt is voluntary. On SI and fall precaution. Safety was maintained throughout the shift. No medications side effects observed or reported. Pt need to be reminded to use the toilet Q2H.  Good intake of food and fluids.  Hygiene is appropriate. Pt hair was done and she took a shower. Ate 100% of breakfast and 90% of lunch. Pt has a new order for Bactrim DS BID x 3 days due to positive UA/UC results. Repeat BMP on Tuesday, 9/26. Metoprolol was on hold today. Blood pressure 100/67, pulse 78, temperature 97.1  F (36.2  C), temperature source Oral, resp. rate 16, height 1.524 m (5'), weight 110.5 kg (243 lb 9.6 oz), last menstrual period 01/06/2015, SpO2 98 %, not currently breastfeeding.      "

## 2023-09-25 NOTE — PLAN OF CARE
BEH IP Unit Acuity Rating Score (UARS)   Acuity Rating for Station 32N currently located on 4AW as of 6/22/2023.   expanded to stations 20N and 22N as of 9/20/2023  Patient is given one point for every criteria they meet.    CRITERIA SCORING   On a 72 hour hold, court hold, committed, stay of commitment, or revocation 0    Patient LOS on BEH unit exceeds 20 days 0  LOS: 5   Patient under guardianship, 55+, otherwise medically complex, or under age 11 1   Suicide ideation without relief of precipitating factors 0   Current plan for suicide 0   Current plan for homicide 0   Imminent risk or actual attempt to seriously harm another without relief of factors precipitating the attempt 0   Severe dysfunction in daily living (ex: complete neglect for self care, extreme disruption in vegetative function, extreme deterioration in social interactions) 1   Recent (last 7 days) or current physical aggression in the ED or on unit 0   Restraints or seclusion episode in past 72 hours 0   Recent (last 7 days) or current verbal aggression, agitation, yelling, etc., while in the ED or unit 0   Active psychosis 0   Need for constant or near constant redirection (from leaving, from others, etc).  0   Intrusive or disruptive behaviors 0   TOTAL 2

## 2023-09-25 NOTE — PLAN OF CARE
Pt remained in her room the entire shift.  Pt appears to have slept for  6.75 hours. No PRNs given or requested this shift. No concerns noted this shift. Will continue to monitor and offer support.     Problem: Sleep Disturbance  Goal: Adequate Sleep/Rest  Outcome: Progressing   Goal Outcome Evaluation:
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
## 2023-09-25 NOTE — PLAN OF CARE
Team Note Due:  Wednesday     Assessment/Intervention/Current Symtoms and Care Coordination:  Chart review and met with team, discussed pt progress, symptomology, and response to treatment.  Discussed the discharge plan and any potential impediments to discharge.    - Patient is med compliant, affect is flat but brightens when approached. Denies AH telling her to hurt herself, reports last time she heard the voices was on 9/21. Reports migraines. Reports her depression is 5/10. Reports anxiety is 6/10. Reports feeling better.   - Patient will be discharged on 9/27.   - Writer called [facility]. Group home would like a copy of the scripts for each medications due new State regulations. GP would prefer if patient can arrive by on Wednesday 9/27 around 1:00 PM.  - Writer placed request with care coordinators for transportation arrangements.      Discharge Plan or Goal:  Group home with outpatient providers        Barriers to Discharge:  Admitted for SI, AH, and worsening depression. Needs clinical stabilization and med mgmt     Referral Status:  None today      Legal Status:  Voluntary    County: [place]    Contacts:  Group Home:  Name/Clinic: [facility]    Number: [phone]  ([place])     Upcoming Meetings and Dates/Important Information and next steps:

## 2023-09-25 NOTE — PROGRESS NOTES
Brief internal medicine note    Medicine following up on UTI and diabetes management.    Patient's urine culture sensitivities show susceptibility to Bactrim.  Continue Bactrim therapy as ordered.    Appreciate endocrinology recommendations for diabetes.  They have now signed off.  Patient will continue with steroid taper with next dose decreased on 9/27.  At that time, we will make appropriate adjustments to mealtime insulin, and further to long-acting insulin and sliding scale as indicated.    Noted that endocrinology recommended follow-up outpatient with their service for continued management of diabetes.  I have placed referral for 2-week window following discharge in the discharge orders.  Also noted that rheumatology recommended follow-up with their service for continued evaluation of lower extremity weakness and multifocal pain, with elevated inflammatory markers and with history of polyarthralgia.  I have placed order for referral with 1 month timeframe to rheumatology in the discharge navigator.      Medicine service will continue to follow peripherally to assist with management of diabetes.      Irlanda Castaneda PA-C  [facility]  9/25/2023 1:26 PM

## 2023-09-26 LAB
ANION GAP SERPL CALCULATED.3IONS-SCNC: 9 MMOL/L (ref 7–15)
BUN SERPL-MCNC: 22.6 MG/DL (ref 8–23)
CALCIUM SERPL-MCNC: 9.4 MG/DL (ref 8.8–10.2)
CHLORIDE SERPL-SCNC: 103 MMOL/L (ref 98–107)
CREAT SERPL-MCNC: 1.19 MG/DL (ref 0.51–0.95)
DEPRECATED HCO3 PLAS-SCNC: 27 MMOL/L (ref 22–29)
EGFRCR SERPLBLD CKD-EPI 2021: 51 ML/MIN/1.73M2
GLUCOSE BLDC GLUCOMTR-MCNC: 103 MG/DL (ref 70–99)
GLUCOSE BLDC GLUCOMTR-MCNC: 132 MG/DL (ref 70–99)
GLUCOSE BLDC GLUCOMTR-MCNC: 160 MG/DL (ref 70–99)
GLUCOSE BLDC GLUCOMTR-MCNC: 257 MG/DL (ref 70–99)
GLUCOSE BLDC GLUCOMTR-MCNC: 297 MG/DL (ref 70–99)
GLUCOSE SERPL-MCNC: 80 MG/DL (ref 70–99)
HOLD SPECIMEN: NORMAL
POTASSIUM SERPL-SCNC: 4.2 MMOL/L (ref 3.4–5.3)
SODIUM SERPL-SCNC: 139 MMOL/L (ref 136–145)

## 2023-09-26 PROCEDURE — 250N000013 HC RX MED GY IP 250 OP 250 PS 637

## 2023-09-26 PROCEDURE — 99232 SBSQ HOSP IP/OBS MODERATE 35: CPT | Mod: GC | Performed by: PSYCHIATRY & NEUROLOGY

## 2023-09-26 PROCEDURE — G0177 OPPS/PHP; TRAIN & EDUC SERV: HCPCS

## 2023-09-26 PROCEDURE — 2894A PR VOIDCORRECT: CPT | Mod: GC | Performed by: PSYCHIATRY & NEUROLOGY

## 2023-09-26 PROCEDURE — 124N000002 HC R&B MH UMMC

## 2023-09-26 PROCEDURE — 250N000012 HC RX MED GY IP 250 OP 636 PS 637

## 2023-09-26 PROCEDURE — 36415 COLL VENOUS BLD VENIPUNCTURE: CPT

## 2023-09-26 PROCEDURE — 80048 BASIC METABOLIC PNL TOTAL CA: CPT

## 2023-09-26 PROCEDURE — 99231 SBSQ HOSP IP/OBS SF/LOW 25: CPT | Performed by: NURSE PRACTITIONER

## 2023-09-26 RX ORDER — PREDNISONE 10 MG/1
10 TABLET ORAL DAILY
Status: DISCONTINUED | OUTPATIENT
Start: 2023-09-27 | End: 2023-09-27 | Stop reason: HOSPADM

## 2023-09-26 RX ORDER — ACETAMINOPHEN 500 MG
1000 TABLET ORAL 3 TIMES DAILY PRN
Qty: 90 TABLET | Refills: 0 | Status: SHIPPED | OUTPATIENT
Start: 2023-09-26 | End: 2023-09-27

## 2023-09-26 RX ORDER — ESTRADIOL 0.1 MG/G
CREAM VAGINAL
Qty: 42.5 G | Refills: 3 | Status: SHIPPED | OUTPATIENT
Start: 2023-09-26 | End: 2023-12-11

## 2023-09-26 RX ORDER — PREDNISONE 5 MG/1
5 TABLET ORAL DAILY
Status: DISCONTINUED | OUTPATIENT
Start: 2023-10-07 | End: 2023-09-27 | Stop reason: HOSPADM

## 2023-09-26 RX ORDER — CYPROHEPTADINE HYDROCHLORIDE 4 MG/1
4 TABLET ORAL
Qty: 30 TABLET | Refills: 0 | Status: SHIPPED | OUTPATIENT
Start: 2023-09-26 | End: 2023-09-27

## 2023-09-26 RX ORDER — PREDNISONE 2.5 MG/1
2.5 TABLET ORAL DAILY
Qty: 5 TABLET | Refills: 0 | Status: SHIPPED | OUTPATIENT
Start: 2023-10-12 | End: 2023-09-27

## 2023-09-26 RX ORDER — MULTIVIT,TX WITH IRON,MINERALS
250 TABLET, EXTENDED RELEASE ORAL AT BEDTIME
Qty: 30 TABLET | Refills: 0 | Status: SHIPPED | OUTPATIENT
Start: 2023-09-26 | End: 2023-09-27

## 2023-09-26 RX ORDER — ESCITALOPRAM OXALATE 20 MG/1
20 TABLET ORAL DAILY
Qty: 30 TABLET | Refills: 0 | Status: SHIPPED | OUTPATIENT
Start: 2023-09-27 | End: 2023-09-27

## 2023-09-26 RX ORDER — PREDNISONE 2.5 MG/1
7.5 TABLET ORAL DAILY
Qty: 5 TABLET | Refills: 0 | Status: SHIPPED | OUTPATIENT
Start: 2023-10-02 | End: 2023-09-27

## 2023-09-26 RX ORDER — LIDOCAINE 4 G/G
1 PATCH TOPICAL EVERY 24 HOURS
Qty: 30 PATCH | Refills: 0 | Status: SHIPPED | OUTPATIENT
Start: 2023-09-26 | End: 2023-09-27

## 2023-09-26 RX ORDER — PREDNISONE 5 MG/1
5 TABLET ORAL DAILY
Qty: 5 TABLET | Refills: 0 | Status: SHIPPED | OUTPATIENT
Start: 2023-10-07 | End: 2023-09-27

## 2023-09-26 RX ORDER — HYDROXYZINE PAMOATE 25 MG/1
25 CAPSULE ORAL EVERY 4 HOURS PRN
Qty: 60 CAPSULE | Refills: 0 | Status: SHIPPED | OUTPATIENT
Start: 2023-09-26 | End: 2023-10-13

## 2023-09-26 RX ORDER — PREDNISONE 10 MG/1
10 TABLET ORAL DAILY
Qty: 4 TABLET | Refills: 0 | Status: SHIPPED | OUTPATIENT
Start: 2023-09-27 | End: 2023-09-27

## 2023-09-26 RX ORDER — AMANTADINE HYDROCHLORIDE 100 MG/1
100 CAPSULE, GELATIN COATED ORAL DAILY
Qty: 30 CAPSULE | Refills: 0 | Status: SHIPPED | OUTPATIENT
Start: 2023-09-27 | End: 2023-09-27

## 2023-09-26 RX ORDER — PREDNISONE 2.5 MG/1
2.5 TABLET ORAL DAILY
Status: DISCONTINUED | OUTPATIENT
Start: 2023-10-12 | End: 2023-09-27 | Stop reason: HOSPADM

## 2023-09-26 RX ORDER — SULFAMETHOXAZOLE/TRIMETHOPRIM 800-160 MG
1 TABLET ORAL 2 TIMES DAILY
Qty: 1 TABLET | Refills: 0 | Status: SHIPPED | OUTPATIENT
Start: 2023-09-26 | End: 2023-09-27

## 2023-09-26 RX ORDER — QUETIAPINE FUMARATE 25 MG/1
125 TABLET, FILM COATED ORAL AT BEDTIME
Qty: 150 TABLET | Refills: 0 | Status: SHIPPED | OUTPATIENT
Start: 2023-09-26 | End: 2023-09-27

## 2023-09-26 RX ORDER — PALIPERIDONE 6 MG/1
12 TABLET, EXTENDED RELEASE ORAL AT BEDTIME
Qty: 60 TABLET | Refills: 0 | Status: SHIPPED | OUTPATIENT
Start: 2023-09-26 | End: 2023-09-27

## 2023-09-26 RX ADMIN — SULFAMETHOXAZOLE AND TRIMETHOPRIM 1 TABLET: 800; 160 TABLET ORAL at 21:40

## 2023-09-26 RX ADMIN — METOPROLOL SUCCINATE 50 MG: 25 TABLET, FILM COATED, EXTENDED RELEASE ORAL at 07:55

## 2023-09-26 RX ADMIN — MIRABEGRON 50 MG: 25 TABLET, FILM COATED, EXTENDED RELEASE ORAL at 07:55

## 2023-09-26 RX ADMIN — PANTOPRAZOLE SODIUM 40 MG: 40 TABLET, DELAYED RELEASE ORAL at 07:56

## 2023-09-26 RX ADMIN — DOCUSATE SODIUM 100 MG: 100 CAPSULE, LIQUID FILLED ORAL at 07:56

## 2023-09-26 RX ADMIN — SENNOSIDES AND DOCUSATE SODIUM 1 TABLET: 50; 8.6 TABLET ORAL at 21:40

## 2023-09-26 RX ADMIN — SULFAMETHOXAZOLE AND TRIMETHOPRIM 1 TABLET: 800; 160 TABLET ORAL at 07:56

## 2023-09-26 RX ADMIN — PREDNISONE 12.5 MG: 10 TABLET ORAL at 07:56

## 2023-09-26 RX ADMIN — ACETAMINOPHEN 1000 MG: 500 TABLET ORAL at 08:00

## 2023-09-26 RX ADMIN — ASPIRIN 81 MG: 81 TABLET, COATED ORAL at 07:56

## 2023-09-26 RX ADMIN — AMANTADINE HYDROCHLORIDE 100 MG: 100 CAPSULE ORAL at 07:55

## 2023-09-26 RX ADMIN — DICLOFENAC SODIUM 4 G: 10 GEL TOPICAL at 21:39

## 2023-09-26 RX ADMIN — Medication 250 MG: at 21:40

## 2023-09-26 RX ADMIN — PALIPERIDONE 12 MG: 3 TABLET, EXTENDED RELEASE ORAL at 21:39

## 2023-09-26 RX ADMIN — DOCUSATE SODIUM 100 MG: 100 CAPSULE, LIQUID FILLED ORAL at 21:41

## 2023-09-26 RX ADMIN — Medication 600 MG: at 07:56

## 2023-09-26 RX ADMIN — QUETIAPINE FUMARATE 125 MG: 100 TABLET ORAL at 21:40

## 2023-09-26 RX ADMIN — ACETAMINOPHEN 1000 MG: 500 TABLET ORAL at 13:02

## 2023-09-26 RX ADMIN — LOSARTAN POTASSIUM 100 MG: 25 TABLET, FILM COATED ORAL at 07:55

## 2023-09-26 RX ADMIN — TRAZODONE HYDROCHLORIDE 100 MG: 100 TABLET ORAL at 21:40

## 2023-09-26 RX ADMIN — ATORVASTATIN CALCIUM 80 MG: 80 TABLET, FILM COATED ORAL at 21:41

## 2023-09-26 RX ADMIN — LIDOCAINE PATCH 4% 1 PATCH: 40 PATCH TOPICAL at 07:55

## 2023-09-26 RX ADMIN — INSULIN ASPART 4 UNITS: 100 INJECTION, SOLUTION INTRAVENOUS; SUBCUTANEOUS at 21:37

## 2023-09-26 RX ADMIN — GABAPENTIN 300 MG: 300 CAPSULE ORAL at 21:41

## 2023-09-26 RX ADMIN — METOPROLOL SUCCINATE 100 MG: 25 TABLET, FILM COATED, EXTENDED RELEASE ORAL at 21:40

## 2023-09-26 RX ADMIN — ACETAMINOPHEN 1000 MG: 500 TABLET ORAL at 21:41

## 2023-09-26 RX ADMIN — ESCITALOPRAM OXALATE 20 MG: 10 TABLET ORAL at 07:56

## 2023-09-26 ASSESSMENT — ACTIVITIES OF DAILY LIVING (ADL)
ADLS_ACUITY_SCORE: 59
ADLS_ACUITY_SCORE: 49
HYGIENE/GROOMING: HANDWASHING;INDEPENDENT
ADLS_ACUITY_SCORE: 59
ADLS_ACUITY_SCORE: 59
ORAL_HYGIENE: INDEPENDENT
ADLS_ACUITY_SCORE: 59
ADLS_ACUITY_SCORE: 59
DRESS: SCRUBS (BEHAVIORAL HEALTH)
ADLS_ACUITY_SCORE: 59
ADLS_ACUITY_SCORE: 59
ADLS_ACUITY_SCORE: 49
ADLS_ACUITY_SCORE: 59

## 2023-09-26 NOTE — CARE PLAN
[facility] Occupational Therapy Group Intervention Note     09/26/23 1251   Group Therapy Session   Group Attendance attended group session   Total Time (minutes) 90   Group Type task skill;life skill;psychoeducation   Group Topic Covered coping skills/lifestyle management;relapse prevention;leisure exploration/use of leisure time;cognitive activities   Group Session Detail 1) Group game to facilitate peer socialization and openness within group discussion.      2) Clinic - coping skill exploration, creative expression within personally meaningful activities, and observation of social, cognitive, and kinesthetic performance skills   Patient Response/Contribution cooperative with task;organized;discussed personal experience with topic;listened actively   Patient Participation Detail 1) Attentive throughout duration of group. Able to sequence 3-step task. Fully engaged in discussion. Demonstrated openness with peers as she shared about personal recovery / MH and goals for the future; reported personal importance of family including her 7 grandchildren.      2) Congruent-bright affect. IND to gather materials, organize work space, sequence task, and reach task completion. Demonstrated fair social engagement upon approach. Is looking forward to discharge tomorrow.      Tyra Edmonds OT on 9/26/2023 at 12:54 PM

## 2023-09-26 NOTE — PROGRESS NOTES
Diabetes Consult Daily  Progress Note          Assessment/Plan:     HPI:  Nena Tang is a 63 year old women with a past history notable for schizoaffective disorder, depressive type, PTSD, CINDY, type II diabetes mellitus, COVID infection 1 month ago, among others. Patient presented to the emergency department for evaluation of suicidal ideation        Signed off on 9/23 - team paging now and wanting a plan for long--steroid taper and patient is discharging tomorrow - re-consult.    Will provide recommendations as best as able given the patterns, since she is discharging, will not follow.      Assessment:     Type 2 diabetes with reasonable control (A1c of 7.0%) now with steroid hyperglycemia   2.   BMI:  47     Plan:     *steroids taper on 9/27 - all insulin adjusted per team request*                 -Lantus 30 units in AM and reduce to 20 unit(s) at 2000 since steroids will taper at tomorrow 9/27               -Novolog 1:5 CHO coverage TID AC/snacks.  Cover all intake.                -Novolog high sliding scale AC/HS               -BG monitoring TID AC, HS, 0200     -PTA:  holding Trulicity while inpatient                -hypoglycemia protocol               -recommend carb consistent diet with carb counting protocol               -on discharge, will recommend outpatient follow up with [facility] service      -diabetes education needs: Likely none- patient's group home manages medications     -Outpatient follow up: [facility]    -Test claim: none      -Inpatient Diabetes Service Signing off 09/26/2023 - patient discharging 9/27/23.    Please call back with any questions or if BG become more labile or if getting closer to discharge and assistance is needed for home recommendations.    Please allow at least 24 hours for home recs if they are not provided.     Plan for discharge: WILL NEED CLOSE FOLLOW UP  OUT PATIENT FOR HER STEROID TAPER - WILL ONLY PROVIDE FOR  PREDNISONE 10 MG.    While on Prednisone 10 mg:  Lantus 30 unit(s) in AM at 0800 20 unit(s) at 2000  Novolog 1 unit(s) per 5 g cho meals and snacks or return to fixed doses if that is preferred (12 units with meals of 65 - 75 grams chos) or 6 unit(s) for smaller meals/snacks of 30 grams chos    Novolog high resistance sliding scale insulin TID AC and HS    Correction Scale - HIGH INSULIN RESISTANCE DOSING      Do Not give Correction Insulin if Pre-Meal BG less than 140.    For Pre-Meal  - 164 give 1 unit.    For Pre-Meal  - 189 give 2 units.    For Pre-Meal  - 214 give 3 units.    For Pre-Meal  - 239 give 4 units.    For Pre-Meal  - 264 give 5 units.    For Pre-Meal  - 289 give 6 units.    For Pre-Meal  - 314 give 7 units.    For Pre-Meal  - 339 give 8 units.    For Pre-Meal  - 364 give 9 units.    For Pre-Meal BG greater than or equal to 365 give 10 units   To be given with prandial insulin, and based on pre-meal blood glucose.      HIGH INSULIN RESISTANCE DOSING     Do Not give Bedtime Correction Insulin if BG less than 200.    For  - 224 give 1 units.    For  - 249 give 2 units.    For  - 274 give 3 units.    For  - 299 give 4 units.    For  - 324 give 5 units.    For  - 349 give 6 units.    For BG greater than or equal to 350 give 7 units.      Plan discussed bedside RN/primary team via this note.         Interval History:     The last 24 hours progress and nursing notes reviewed.      BG trend:    Fasting BG: < target - PM lantus reduce for evening dose this evening   Needs more prandial insulin - ICR increased.     AM lantus not given this AM - anticipating significant hyperglycemia as a result of this.     Plan updated for home  She will need close outpatient follow up for the next taper.           Recent Labs   Lab 09/26/23  1125 09/26/23  0925 09/26/23  0803 09/26/23  0747 09/25/23  2107 09/25/23  1749   * 132*  103* 80 273* 298*         Nutrition:     Orders Placed This Encounter      Regular Diet Adult        PTA Regimen:     Lantus 33 units bid  Humalog 8 units tid with meals   Humalog 3 units for every 50 mg/dl over 150  Trulicity 3 mg every 7 days          Review of Systems:   CC: none          Medications:   Steroid planning:               Physical Exam:   BP (!) 146/83   Pulse 86   Temp 98.5  F (36.9  C) (Temporal)   Resp 16   Ht 1.524 m (5')   Wt 111.8 kg (246 lb 8 oz)   LMP 01/06/2015 (Exact Date)   SpO2 97%   BMI 48.14 kg/m           Data:     Lab Results   Component Value Date    A1C 7.0 09/21/2023    A1C 6.7 06/20/2023    A1C 6.5 03/14/2023    A1C 6.4 12/12/2022    A1C 7.4 08/17/2022    A1C 9.1 12/01/2020    A1C 9.7 09/15/2020    A1C 8.6 06/30/2020    A1C 6.2 12/03/2019    A1C 6.6 08/06/2019            CBC RESULTS:   Recent Labs   Lab Test 09/20/23  0016   WBC 8.3   RBC 3.04*   HGB 10.0*   HCT 31.2*   *   MCH 32.9   MCHC 32.1   RDW 13.1        Recent Labs   Lab Test 09/23/23  0820 09/22/23  2142 09/20/23  0706 09/20/23  0016 09/18/23  2303 08/17/23  0049   NA  --   --   --  136  --  138   POTASSIUM  --   --   --  4.9  --  3.9   CHLORIDE  --   --   --  101  --  100   CO2  --   --   --  22  --  26   ANIONGAP  --   --   --  13  --  12   * 327*   < > 200*   < > 177*   BUN  --   --   --  32.0*  --  15.0   CR  --   --   --  1.30*  --  1.13*   ALLEN  --   --   --  9.2  --  9.1    < > = values in this interval not displayed.     Liver Function Studies -   Recent Labs   Lab Test 09/20/23  0016   PROTTOTAL 6.4   ALBUMIN 3.9   BILITOTAL 0.2   ALKPHOS 63   AST 13   ALT 26     Lab Results   Component Value Date    INR 0.88 09/27/2022    INR  11/11/2016     Unsatisfactory specimen - tube underfilled  SPOKE WITH DR VARELA [id number], LY      INR 0.86 08/29/2016    INR 1.02 05/29/2014    INR 0.87 06/19/2011    INR 0.98 05/07/2009     Violetta Patel, APRN CNP, BC-ADM  Inpatient Diabetes  Management Service  Pager - [phone]  Available on [employer]     To contact Endocrine Diabetes service:   From 7AM-5PM: page inpatient diabetes provider who is following the patient that day (see filed or incomplete progress notes/consult notes).  If uncertain of provider assignment: page job code [phone]. (To page job code in-house dial 3 stars, [phone] then enter number).  For questions or updates AFTER HOURS from 5PM-7AM: page the diabetes job code for on call fellow: [phone]    Please notify inpatient diabetes service if changes are planned to steroids, nutrition, or if procedures are planned requiring prolonged NPO status. Diabetes Management Team job code: [phone]    I spent a total of 25 minutes on the date of the encounter doing prep/post-work, chart review, history and exam, documentation and further activities per the note including lab review, multidisciplinary communication, counseling the patient and/or coordinating care regarding acute hyper/hypoglycemic management, as well as discharge management and planning/communication.  See note for details.

## 2023-09-26 NOTE — PLAN OF CARE
Care coordinator scheduled a medical cab with [facility] and confirmed ride through [employer] ([phone])    Medical cab is set to  patient at 1:00pm on September 27th from the [facility] ([address]) and transport pt home ([address]).    Cab has been provided unit telephone number [phone] and instructed to call the unit on arrival.     Please reach out to the cab company phone number listed above if issues arise at .

## 2023-09-26 NOTE — PLAN OF CARE
BEH IP Unit Acuity Rating Score (UARS)   Acuity Rating for Station 32N currently located on 4AW as of 6/22/2023.   expanded to stations 20N and 22N as of 9/20/2023  Patient is given one point for every criteria they meet.    CRITERIA SCORING   On a 72 hour hold, court hold, committed, stay of commitment, or revocation 0    Patient LOS on BEH unit exceeds 20 days 0  LOS: 6   Patient under guardianship, 55+, otherwise medically complex, or under age 11 1   Suicide ideation without relief of precipitating factors 0   Current plan for suicide 0   Current plan for homicide 0   Imminent risk or actual attempt to seriously harm another without relief of factors precipitating the attempt 0   Severe dysfunction in daily living (ex: complete neglect for self care, extreme disruption in vegetative function, extreme deterioration in social interactions) 0   Recent (last 7 days) or current physical aggression in the ED or on unit 0   Restraints or seclusion episode in past 72 hours 0   Recent (last 7 days) or current verbal aggression, agitation, yelling, etc., while in the ED or unit 0   Active psychosis 0   Need for constant or near constant redirection (from leaving, from others, etc).  0   Intrusive or disruptive behaviors 0   TOTAL 1

## 2023-09-26 NOTE — PLAN OF CARE
Team Note Due:  Wednesday     Assessment/Intervention/Current Symtoms and Care Coordination:  Chart review and met with team, discussed pt progress, symptomology, and response to treatment.  Discussed the discharge plan and any potential impediments to discharge.    - Patient denies SI/SIB/HI. Denies AH.She reports readiness for discharge.   - Patient will be discharged on 9/27.   - Writer placed request with care coordinators for transportation arrangements.   - Med ride has been scheduled by care coordinator. Patient will be picked up on 9/26 at 1:00 PM.      Discharge Plan or Goal:  Group home with outpatient providers        Barriers to Discharge:  None     Referral Status:  None today      Legal Status:  Voluntary    County: [place]    Contacts:  Group Home:  Name/Clinic: [facility]    Number: [phone]  ([place])     Upcoming Meetings and Dates/Important Information and next steps:  Discharge

## 2023-09-26 NOTE — DISCHARGE SUMMARY
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 prior to being transferred to the unit and was found to be medically stable and appropriate for admission.      Consults:  Endocrine for diabetes management  Medicine for multiple medical issues, BP control, see above  Rheumatology for prednisone taper in setting of PMR, see above + signed off  Neurology for migraine management, recs above + signed off    Labs were notable for the following:  - CBC notable for Hgb of 10, and               -Reticulocyte count 2.4%  - CMP notable for BUN of 32, creatinine of 1.3, and eGFR of 46              -9/26: creatinine of 1.19   -UA positive for ampicillin resistant klebsiella (started on Bactrim)  -CK wnl  - TSH normal  - Hgb A1c elevated at 7  - Lipids unremarkable  - Vit B12 wnl  - Folate wnl  - EKG normal sinus rhythm, QTc 417     Discharge Medications:     Current Discharge Medication List        CONTINUE these medications which have NOT CHANGED    Details   acetaminophen (TYLENOL) 500 MG tablet Take 1000mg in the morning and 1000g at night. Take 1 additional dose of 500-1000mg mid-day as needed  Qty: 200 tablet, Refills: 11    Associated Diagnoses: Chronic pain  of both shoulders      alendronate (FOSAMAX) 70 MG tablet Take 1 tablet (70 mg) by mouth every 7 days Wednesdays - Take with water, 30 minutes before breakfast. Do not take with any other medicines. Sit upright for 30 minutes after taking.  Qty: 12 tablet, Refills: 3    Associated Diagnoses: Osteoporosis without current pathological fracture, unspecified osteoporosis type      amantadine (SYMMETREL) 100 MG capsule TAKE 1 CAPSULE BY MOUTH EVERY MORNING AND TAKE 2 CAPSULES BY MOUTH EVERY EVENING  Qty: 84 capsule, Refills: 11    Comments: c wk 6/20/23Maximum Refills Reached  Associated Diagnoses: Schizoaffective disorder, depressive type (H)      aspirin (ASPIRIN LOW DOSE) 81 MG EC tablet Take 1 tablet (81 mg) by mouth daily  Qty: 28 tablet, Refills: 11    Comments: raudel 5/23/23  Associated Diagnoses: Coronary artery disease involving native heart with angina pectoris, unspecified vessel or lesion type (H24)      atorvastatin (LIPITOR) 80 MG tablet TAKE 1 TABLET BY MOUTH DAILY  Qty: 90 tablet, Refills: 3    Comments: Authorized Quantity Exceeded  Associated Diagnoses: Hyperlipidemia LDL goal <100      calcium carbonate (OS-ALLEN) 1500 (600 Ca) MG tablet TAKE 1 TABLET BY MOUTH DAILY  Qty: 90 tablet, Refills: 3    Comments: Authorized Quantity Exceeded  Associated Diagnoses: Osteoporosis without current pathological fracture, unspecified osteoporosis type      clonazePAM (KLONOPIN) 0.5 MG tablet TAKE 1 TABLET BY MOUTH EVERY NIGHT AS NEEDED FOR ANXIETY  Qty: 28 tablet, Refills: 5    Comments: Rx was transferrred out due to stock issues.  Please send a new order to our pharmacy for next month.  Thank you.  Associated Diagnoses: Schizoaffective disorder, bipolar type (H)      diclofenac (VOLTAREN) 1 % topical gel Apply 4 grams to painful area at bedtime. May use an additional 3 doses during the day as needed  Qty: 100 g, Refills: 1    Comments: Updated instructions.  Associated Diagnoses: Rib pain on right side       escitalopram (LEXAPRO) 20 MG tablet TAKE 1/2 TABLET BY MOUTH DAILY  Qty: 14 tablet, Refills: 11    Comments: 5/23/23 cwk  Associated Diagnoses: Depression with suicidal ideation      gabapentin (NEURONTIN) 300 MG capsule Take 1 capsule (300 mg) by mouth At Bedtime  Qty: 30 capsule, Refills: 0    Associated Diagnoses: Schizoaffective disorder, depressive type (H); Chronic right-sided low back pain without sciatica      hydrOXYzine (VISTARIL) 25 MG capsule Take 1 capsule (25 mg) by mouth every 4 hours as needed for anxiety May take nightly for sleep  Qty: 60 capsule, Refills: 0    Associated Diagnoses: Psychophysiological insomnia      insulin glargine (LANTUS PEN) 100 UNIT/ML pen Inject 33 Units Subcutaneous 2 times daily  Qty: 60 mL, Refills: 11    Comments: If Lantus is not covered by insurance, may substitute Basaglar or Semglee or other insulin glargine product per insurance preference at same dose and frequency.    Associated Diagnoses: Type 2 diabetes mellitus with mild nonproliferative retinopathy without macular edema, with long-term current use of insulin, unspecified laterality (H)      insulin lispro (HUMALOG KWIKPEN) 100 UNIT/ML (1 unit dial) KWIKPEN TAKE 8 UNITS THREE TIMES A DAY WITH MEALS PLUS SLIDING SCALE . ADD 3 UNITS FOR EVERY 50MG/DL OVER 150 WITH MAX  Qty: 45 mL, Refills: 11    Comments: pls send new asap. thanks!  Associated Diagnoses: Type 2 diabetes mellitus with mild nonproliferative retinopathy without macular edema, with long-term current use of insulin, unspecified laterality (H)      losartan (COZAAR) 100 MG tablet TAKE 1 TABLET BY MOUTH DAILY  Qty: 28 tablet, Refills: 11    Comments: please send new rx ASAP. thansk!  Associated Diagnoses: Coronary artery disease involving native heart with angina pectoris, unspecified vessel or lesion type (H24)      magnesium 250 MG tablet Take 1 tablet (250 mg) by mouth At Bedtime  Qty: 30 tablet, Refills: 11    Associated Diagnoses: Leg cramps       metoprolol succinate ER (TOPROL XL) 50 MG 24 hr tablet TAKE 1 TABLET BY MOUTH IN THE MORNING AND TAKE 2 TABLETS AT BEDTIME  Qty: 84 tablet, Refills: 11    Comments: 5/23/23 cwk  Associated Diagnoses: HTN, goal below 140/90      mirabegron (MYRBETRIQ) 50 MG 24 hr tablet Take 1 tablet (50 mg) by mouth daily  Qty: 90 tablet, Refills: 4    Associated Diagnoses: Urinary frequency      nystatin (MYCOSTATIN) 098209 UNIT/GM external powder Apply topically 2 times daily as needed  Qty: 45 g, Refills: 1    Associated Diagnoses: Rash and nonspecific skin eruption      ondansetron (ZOFRAN) 4 MG tablet Take 1 tablet (4 mg) by mouth every 8 hours as needed for nausea  Qty: 10 tablet, Refills: 1    Associated Diagnoses: Nausea      paliperidone ER (INVEGA) 9 MG 24 hr tablet Take 1 tablet (9 mg) by mouth At Bedtime  Qty: 28 tablet, Refills: 0    Associated Diagnoses: Schizoaffective disorder, depressive type (H)      pantoprazole (PROTONIX) 40 MG EC tablet TAKE 1 TABLET (40 MG) BY MOUTH DAILY  Qty: 28 tablet, Refills: 11    Comments: 5/23/23 cwk  Associated Diagnoses: Gastroesophageal reflux disease without esophagitis      predniSONE (DELTASONE) 5 MG tablet TAKE 3 TABLETS BY MOUTH DAILY  Qty: 84 tablet, Refills: 0    Comments: d wk 9/12/23 Authorized Quantity Exceeded  Associated Diagnoses: Myalgia      QUEtiapine (SEROQUEL) 100 MG tablet Take 100 mg by mouth At Bedtime      senna-docusate (SENOKOT-S/PERICOLACE) 8.6-50 MG tablet Take 1 tablet daily and take an extra tablet with constipation.  Qty: 60 tablet, Refills: 10    Associated Diagnoses: Constipation, unspecified constipation type      traZODone (DESYREL) 100 MG tablet Take 1 tablet (100 mg) by mouth At Bedtime  Qty: 30 tablet, Refills: 0    Associated Diagnoses: Schizoaffective disorder, depressive type (H)      TRULICITY 3 MG/0.5ML SOPN INJECT 3MG SUBCUTANEOUSLY EVERY 7 DAYS  Qty: 2 mL, Refills: 3    Comments: Maximum Refills Reached  Associated Diagnoses: Type 2  diabetes mellitus with hyperglycemia, with long-term current use of insulin (H)      vitamin C (ASCORBIC ACID) 500 MG tablet TAKE 2 TABLETS BY MOUTH IN THE MORNING AND TAKE 2 TABLETS BY MOUTH IN THE EVENING  Qty: 180 tablet, Refills: 3    Comments: rx is out of refills. please send new order for 30+ days so we can start coordinating patient's monthly medication cycle. thanks! -darryn @[facility] 1/29/2023  Associated Diagnoses: Dysuria      zinc oxide (DESITIN) 20 % external ointment Apply topically 3 times daily as needed for irritation (barrier cream)  Qty: 60 g, Refills: 3    Associated Diagnoses: Skin irritation      Alcohol Swabs (EASY TOUCH ALCOHOL PREP MEDIUM) 70 % PADS Apply 1 Units topically 8 times daily  Qty: 400 each, Refills: 11    Associated Diagnoses: Type 2 diabetes mellitus with mild nonproliferative retinopathy without macular edema, with long-term current use of insulin, unspecified laterality (H)      blood glucose (NO BRAND SPECIFIED) test strip Use to test blood sugar 10 times daily or as directed.  Qty: 100 strip, Refills: 11    Associated Diagnoses: Type 2 diabetes mellitus with stage 3 chronic kidney disease, with long-term current use of insulin, unspecified whether stage 3a or 3b CKD (H)      Continuous Blood Gluc Sensor (DEXCOM G6 SENSOR) MISC Change every 10 days.  Qty: 9 each, Refills: 2    Associated Diagnoses: Type 2 diabetes mellitus with mild nonproliferative retinopathy without macular edema, with long-term current use of insulin, unspecified laterality (H)      Continuous Blood Gluc Transmit (DEXCOM G6 TRANSMITTER) MISC Change every 3 months.  Qty: 1 each, Refills: 3    Associated Diagnoses: Type 2 diabetes mellitus with mild nonproliferative retinopathy without macular edema, with long-term current use of insulin, unspecified laterality (H)      NOVOFINE AUTOCOVER PEN NEEDLE 30G X 8 MM miscellaneous USE 6 DAILY OR AS DIRECTED  Qty: 200 each, Refills: 3    Comments: Maximum Refills  Reached  Associated Diagnoses: Type 2 diabetes mellitus with mild nonproliferative retinopathy without macular edema, with long-term current use of insulin, unspecified laterality (H)      !! OneTouch Delica Lancets 33G MISC 1 each as needed (for back up to Dexcom) Use to test blood sugars 1-2 times daily as directed.  Qty: 100 each, Refills: 3    Associated Diagnoses: Type 2 diabetes mellitus with mild nonproliferative retinopathy without macular edema, with long-term current use of insulin, unspecified laterality (H)      !! order for DME Equipment being ordered: Depends.  Qty: 30 each, Refills: 4    Associated Diagnoses: Mixed incontinence      !! order for DME Equipment being ordered: CPAP Supplies.  Qty: 1 each, Refills: 0    Associated Diagnoses: CINDY (obstructive sleep apnea)      !! thin (NO BRAND SPECIFIED) lancets Use with lanceting device. To accompany: Blood Glucose Monitor Brands: per insurance.  Qty: 100 each, Refills: 6    Associated Diagnoses: Type 2 diabetes mellitus with mild nonproliferative retinopathy without macular edema, with long-term current use of insulin, unspecified laterality (H)       !! - Potential duplicate medications found. Please discuss with provider.        STOP taking these medications       docusate sodium (COLACE) 100 MG capsule Comments:   Reason for Stopping:         estradiol (ESTRACE VAGINAL) 0.1 MG/GM vaginal cream Comments:   Reason for Stopping:                Discharge Plan:   Medications as above  Psychiatric Appointments:   Friday September 29th at 11:30am  Provider: ART Hernandez, CNP, PMHNP-BC [facility] [address], [place] [address]  Phone: [phone]  Fax: [phone]  Notes: Intake paperwork will be sent to your email. Please complete at least 24 hours to your appointment time.   Psychotherapy Appointments:    [facility]  Phone: [phone]   Referrals: Rheumatology for Fibromyalgia,  Endocrinology for T2D,    Medical follow up: Follow up with PCP in 3-5 days time      Attestations:     Med Student: Mell San, MS3  [facility] Medical Student        I was present with the medical student who participated in the service and documentation of the note. I have verified the history and personally performed the physical/mental status exam and medical decision making. I agree with the assessment and plan documented in the note.     Rebecca Kaufman MD   Psychiatry Resident  [facility]      Attestation:   The patient has been seen and evaluated by me,  Josué Giang MD. I have examined the patient today and reviewed the discharge plan with the resident and medical student. I agree with the final assessment and plan, as noted in the discharge summary. I have reviewed today's vital signs, medications, labs and imaging.  Total time discharge plannin minutes  Josué Giang MD ,Ph.D.

## 2023-09-26 NOTE — PROGRESS NOTES
Patient blood sugar 103 prior to breakfast. Novolog held. Will recheck glucose at 0930 prior to administering 30 units of Lantus.

## 2023-09-26 NOTE — PLAN OF CARE
Problem: Plan of Care - These are the overarching goals to be used throughout the patient stay.    Goal: Optimal Comfort and Wellbeing  Outcome: Progressing   Goal Outcome Evaluation:    Plan of Care Reviewed With: patient          Patient is calm, pleasant, and cooperative with cares. She is medication compliant. Morning Novolog and Lantus held for a morning BG of 103, 123, and 160 at lunch time. Afternoon Novolog given. 1 unit for sliding scale, and 14 units for carb count.  Patient chief complaint is lower right flank pain. Scheduled Tylenol and lidocaine patches administered with moderate effect. Patient in the milieu the majority of the day. She is visible, but is generally isolative. She denies all MH symptoms, and contracts for safety.  BP (!) 146/83   Pulse 86   Temp 98.5  F (36.9  C) (Temporal)   Resp 16   Ht 1.524 m (5')   Wt 111.8 kg (246 lb 8 oz)   LMP 01/06/2015 (Exact Date)   SpO2 97%   BMI 48.14 kg/m  .

## 2023-09-26 NOTE — PLAN OF CARE
Pt appears to have slept for 7 hours. No PRNs given or requested this shift. Pt remained in his room the entire shift. No concerns noted this shift; will continue to monitor and offer support.    Problem: Sleep Disturbance  Goal: Adequate Sleep/Rest  Outcome: Progressing   Goal Outcome Evaluation:

## 2023-09-26 NOTE — PLAN OF CARE
Goal Outcome Evaluation:    Plan of Care Reviewed With: patient          Problem: Plan of Care - These are the overarching goals to be used throughout the patient stay.    Goal: Plan of Care Review  Description: The Plan of Care Review/Shift note should be completed every shift.  The Outcome Evaluation is a brief statement about your assessment that the patient is improving, declining, or no change.  This information will be displayed automatically on your shift note.  9/25/2023 2213 by Ese Cosby RN  Outcome: Progressing     Pt BG this evening 298 before dinner. At . Pt on carb counting. Coverage was given and scheduled Lantus. Pt was up and visible in the milieu majority of the shift sitting on the lounge but keep to herself. Pt is pleasant upon approach. Pt denies SI/HI/SIB, depression, anxiety and hallucinations. Pt is contract for safety.  Pt was seen by the keith. Pt is voluntary. On SI and fall precaution. Safety was maintained throughout the shift. No medications side effects observed or reported. Pt is independent with ADLs, need to be reminded to use the toilet Q2H.  Good intake of food and fluids.  Hygiene is appropriate. Ate 100% of dinner. Pt has a new order for Bactrim DS BID x 3 days due to positive UA/UC results. Please encourage pt to push fluids. Pt was offered a jar of water and she completed. Repeat BMP on Tuesday, 9/26. Blood pressure (!) 146/83, pulse 86, temperature 98.6  F (37  C), temperature source Oral, resp. rate 16, height 1.524 m (5'), weight 110.5 kg (243 lb 9.6 oz), last menstrual period 01/06/2015, SpO2 95 %, not currently breastfeeding.

## 2023-09-27 VITALS
HEART RATE: 76 BPM | SYSTOLIC BLOOD PRESSURE: 179 MMHG | RESPIRATION RATE: 16 BRPM | DIASTOLIC BLOOD PRESSURE: 100 MMHG | HEIGHT: 60 IN | WEIGHT: 246.5 LBS | OXYGEN SATURATION: 97 % | BODY MASS INDEX: 48.39 KG/M2 | TEMPERATURE: 97.3 F

## 2023-09-27 LAB
GLUCOSE BLDC GLUCOMTR-MCNC: 107 MG/DL (ref 70–99)
GLUCOSE BLDC GLUCOMTR-MCNC: 277 MG/DL (ref 70–99)

## 2023-09-27 PROCEDURE — 250N000012 HC RX MED GY IP 250 OP 636 PS 637

## 2023-09-27 PROCEDURE — 250N000013 HC RX MED GY IP 250 OP 250 PS 637

## 2023-09-27 PROCEDURE — H2032 ACTIVITY THERAPY, PER 15 MIN: HCPCS

## 2023-09-27 RX ORDER — ACETAMINOPHEN 500 MG
1000 TABLET ORAL 3 TIMES DAILY PRN
Qty: 90 TABLET | Refills: 0 | Status: SHIPPED | OUTPATIENT
Start: 2023-09-27 | End: 2023-11-08

## 2023-09-27 RX ORDER — MULTIVIT,TX WITH IRON,MINERALS
250 TABLET, EXTENDED RELEASE ORAL AT BEDTIME
Qty: 30 TABLET | Refills: 0 | Status: SHIPPED | OUTPATIENT
Start: 2023-09-27 | End: 2023-10-03

## 2023-09-27 RX ORDER — SULFAMETHOXAZOLE/TRIMETHOPRIM 800-160 MG
1 TABLET ORAL 2 TIMES DAILY
Qty: 1 TABLET | Refills: 0 | Status: SHIPPED | OUTPATIENT
Start: 2023-09-27 | End: 2023-10-03

## 2023-09-27 RX ORDER — AMANTADINE HYDROCHLORIDE 100 MG/1
100 CAPSULE, GELATIN COATED ORAL DAILY
Qty: 30 CAPSULE | Refills: 0 | Status: SHIPPED | OUTPATIENT
Start: 2023-09-27 | End: 2023-11-17

## 2023-09-27 RX ORDER — QUETIAPINE FUMARATE 25 MG/1
125 TABLET, FILM COATED ORAL AT BEDTIME
Qty: 150 TABLET | Refills: 0 | Status: SHIPPED | OUTPATIENT
Start: 2023-09-27 | End: 2023-10-13

## 2023-09-27 RX ORDER — PREDNISONE 2.5 MG/1
2.5 TABLET ORAL DAILY
Qty: 5 TABLET | Refills: 0 | Status: SHIPPED | OUTPATIENT
Start: 2023-10-12 | End: 2023-11-08

## 2023-09-27 RX ORDER — PREDNISONE 2.5 MG/1
7.5 TABLET ORAL DAILY
Qty: 5 TABLET | Refills: 0 | Status: SHIPPED | OUTPATIENT
Start: 2023-10-02 | End: 2023-11-08

## 2023-09-27 RX ORDER — ESCITALOPRAM OXALATE 20 MG/1
20 TABLET ORAL DAILY
Qty: 30 TABLET | Refills: 0 | Status: SHIPPED | OUTPATIENT
Start: 2023-09-27 | End: 2023-11-17

## 2023-09-27 RX ORDER — PREDNISONE 5 MG/1
5 TABLET ORAL DAILY
Qty: 5 TABLET | Refills: 0 | Status: SHIPPED | OUTPATIENT
Start: 2023-10-07 | End: 2023-11-08

## 2023-09-27 RX ORDER — PREDNISONE 10 MG/1
10 TABLET ORAL DAILY
Qty: 4 TABLET | Refills: 0 | Status: SHIPPED | OUTPATIENT
Start: 2023-09-27 | End: 2023-10-03

## 2023-09-27 RX ORDER — PALIPERIDONE 6 MG/1
12 TABLET, EXTENDED RELEASE ORAL AT BEDTIME
Qty: 60 TABLET | Refills: 0 | Status: SHIPPED | OUTPATIENT
Start: 2023-09-27 | End: 2023-10-13

## 2023-09-27 RX ORDER — ACETAMINOPHEN 500 MG
1000 TABLET ORAL 3 TIMES DAILY PRN
Qty: 90 TABLET | Refills: 0 | Status: SHIPPED | OUTPATIENT
Start: 2023-09-27 | End: 2023-09-27

## 2023-09-27 RX ORDER — LIDOCAINE 4 G/G
1 PATCH TOPICAL EVERY 24 HOURS
Qty: 30 PATCH | Refills: 0 | Status: SHIPPED | OUTPATIENT
Start: 2023-09-27 | End: 2023-11-03

## 2023-09-27 RX ADMIN — ACETAMINOPHEN 1000 MG: 500 TABLET ORAL at 13:01

## 2023-09-27 RX ADMIN — MIRABEGRON 50 MG: 25 TABLET, FILM COATED, EXTENDED RELEASE ORAL at 08:13

## 2023-09-27 RX ADMIN — SULFAMETHOXAZOLE AND TRIMETHOPRIM 1 TABLET: 800; 160 TABLET ORAL at 08:13

## 2023-09-27 RX ADMIN — LIDOCAINE PATCH 4% 1 PATCH: 40 PATCH TOPICAL at 08:14

## 2023-09-27 RX ADMIN — Medication 600 MG: at 08:13

## 2023-09-27 RX ADMIN — ASPIRIN 81 MG: 81 TABLET, COATED ORAL at 08:13

## 2023-09-27 RX ADMIN — DOCUSATE SODIUM 100 MG: 100 CAPSULE, LIQUID FILLED ORAL at 08:14

## 2023-09-27 RX ADMIN — LOSARTAN POTASSIUM 100 MG: 25 TABLET, FILM COATED ORAL at 08:14

## 2023-09-27 RX ADMIN — PANTOPRAZOLE SODIUM 40 MG: 40 TABLET, DELAYED RELEASE ORAL at 08:13

## 2023-09-27 RX ADMIN — AMANTADINE HYDROCHLORIDE 100 MG: 100 CAPSULE ORAL at 08:13

## 2023-09-27 RX ADMIN — METOPROLOL SUCCINATE 50 MG: 25 TABLET, FILM COATED, EXTENDED RELEASE ORAL at 08:13

## 2023-09-27 RX ADMIN — PREDNISONE 10 MG: 10 TABLET ORAL at 08:14

## 2023-09-27 RX ADMIN — ESCITALOPRAM OXALATE 20 MG: 10 TABLET ORAL at 08:13

## 2023-09-27 RX ADMIN — ACETAMINOPHEN 1000 MG: 500 TABLET ORAL at 08:13

## 2023-09-27 ASSESSMENT — ACTIVITIES OF DAILY LIVING (ADL)
DRESS: SCRUBS (BEHAVIORAL HEALTH);INDEPENDENT;PROMPTS
ADLS_ACUITY_SCORE: 49
ADLS_ACUITY_SCORE: 49
ADLS_ACUITY_SCORE: 59
ADLS_ACUITY_SCORE: 59
HYGIENE/GROOMING: HANDWASHING;SHOWER;INDEPENDENT
LAUNDRY: UNABLE TO COMPLETE
ADLS_ACUITY_SCORE: 49
ORAL_HYGIENE: INDEPENDENT;PROMPTS
ADLS_ACUITY_SCORE: 49
ADLS_ACUITY_SCORE: 49

## 2023-09-27 NOTE — PROGRESS NOTES
Pt participated in dance/movement therapy (D/MT) using gently soothing motions for self-compassion.  Rocking was used for self-soothing, while eye contact and other bridging motions supported social engagement.  Pt was preparing for discharge and still deeply grieving several personal losses over the past two years.       09/27/23 1015   Expressive Therapy   Therapy Type dance/movement   Minutes of Treatment 55

## 2023-09-27 NOTE — PROGRESS NOTES
Pt left the unit on discharge to her group home at about 1325 with all her belongings/meds after discharge education was done. Updated medications orders/changes was attached to AVS.  Pt ate early lunch, took her 1400 scheduled tylenol before leaving. Pt was in good spirits.

## 2023-09-27 NOTE — PROGRESS NOTES
I was consulted on 9/23 due to Ms. Tang headache (please see consult note).  I have since learned from the rheumatology staff that Ms. Tang has had some degree of weakness, and she has been on a relatively high dose of steroids for some time.  Since rheumatology does not find a compelling reason to maintain steroids, she is now on a taper.    From the neurology perspective I am in agreement with this steroid taper plan.  Due to the long duration of steroid use, she is at risk for a steroid induced myopathy.  Since the consult last week was in regard to her headache, I did not specifically evaluate her for patterns of weakness that might be seen in steroid-induced myopathy such as proximal greater than distal weakness.  She does have some other phenotypic features that can be seen in long-term steroid use, however.  She was fully ambulatory, walking without use of an assistive device, and all extremities were antigravity.    I would recommend follow-up with outpatient neurology so that any weakness can be monitored in a long-term setting.  -Please place outpatient neurology consultation upon discharge    Carlo Conroy MD

## 2023-09-27 NOTE — PLAN OF CARE
BEH IP Unit Acuity Rating Score (UARS)   Acuity Rating for Station 32N currently located on 4AW as of 6/22/2023.   expanded to stations 20N and 22N as of 9/20/2023  Patient is given one point for every criteria they meet.    CRITERIA SCORING   On a 72 hour hold, court hold, committed, stay of commitment, or revocation 0    Patient LOS on BEH unit exceeds 20 days 0  LOS: 7   Patient under guardianship, 55+, otherwise medically complex, or under age 11 0   Suicide ideation without relief of precipitating factors 0   Current plan for suicide 0   Current plan for homicide 0   Imminent risk or actual attempt to seriously harm another without relief of factors precipitating the attempt 0   Severe dysfunction in daily living (ex: complete neglect for self care, extreme disruption in vegetative function, extreme deterioration in social interactions) 0   Recent (last 7 days) or current physical aggression in the ED or on unit 0   Restraints or seclusion episode in past 72 hours 0   Recent (last 7 days) or current verbal aggression, agitation, yelling, etc., while in the ED or unit 0   Active psychosis 0   Need for constant or near constant redirection (from leaving, from others, etc).  0   Intrusive or disruptive behaviors 0   TOTAL 0

## 2023-09-27 NOTE — PLAN OF CARE
Team Note Due:  Wednesday     Assessment/Intervention/Current Symtoms and Care Coordination:  Chart review and met with team, discussed pt progress, symptomology, and response to treatment.  Discussed the discharge plan and any potential impediments to discharge.    - Patient denies SI/SIB/HI. Denies AH.She reports readiness for discharge.   - Med ride has been scheduled by care coordinator. Patient will be picked up on 9/26 at 1:00 PM.   - AVS completed.      Discharge Plan or Goal:  Group home with outpatient providers        Barriers to Discharge:  None     Referral Status:  None today      Legal Status:  Voluntary    County: [place]    Contacts:  Group Home:  [facility]    Number: [phone]  ([place])     Upcoming Meetings and Dates/Important Information and next steps:  Discharge

## 2023-09-27 NOTE — CARE PLAN
Occupational Therapy Group Note      09/26/23 2049   Group Therapy Session   Group Attendance attended group session   Time Session Began 2000   Time Session Ended 2045   Total Time (minutes) 45   Total # Attendees 2   Group Type expressive therapy   Group Topic Covered coping skills/lifestyle management;relaxation techniques;emotions/expression   Group Session Detail OT: Watercolor activity for relaxation, parasympathetic activation, organization, reality orientation and coping with stress/sxs.    Patient Response/Contribution cooperative with task    Pt joined group and was a positive participant.  Pt worked in a goal directed manner for the duration of group.  Pt's approach to the task suggests a concrete thought process.  Pt talked about her anticipated discharge and expressed hope for the future.  Affect was congruent.

## 2023-09-27 NOTE — PLAN OF CARE
Problem: Depression  Goal: Improved Mood  Outcome: Progressing     Problem: Suicidal Behavior  Goal: Suicidal Behavior is Absent or Managed  Outcome: Progressing     Problem: Psychotic Signs/Symptoms  Goal: Improved Mood Symptoms  Intervention: Optimize Emotion and Mood  Recent Flowsheet Documentation  Taken 9/26/2023 1800 by Juve Norris RN  Diversional Activity: television     Problem: Psychotic Signs/Symptoms  Goal: Improved Psychomotor Symptoms (Psychotic Signs/Symptoms)  Intervention: Manage Psychomotor Movement  Recent Flowsheet Documentation  Taken 9/26/2023 1800 by Juve Norris RN  Diversional Activity: television  Activity (Behavioral Health):   up ad lito   activity encouraged     Problem: Adult Behavioral Health Plan of Care  Goal: Absence of New-Onset Illness or Injury  Intervention: Identify and Manage Fall Risk  Recent Flowsheet Documentation  Taken 9/26/2023 1800 by Juve Norris RN  Safety Measures:   environmental rounds completed   safety rounds completed   Goal Outcome Evaluation:    Plan of Care Reviewed With: patient        Alert and oriented x3, able to communicate needs and verbalize feeling. Pleasant, cooperative and compliant with medications. Visible in milieu, out in lounge watching tv all shift. Able to interact with peers and staff members. Patient denied any pain or discomfort, ADLs WNL. Patient denied any anxiety or depression, she denied SI/HI, no SIB, contracted for safety. Blood sugar 257 and 297. Excited about discharge planning for tomorrow.

## 2023-09-27 NOTE — PLAN OF CARE
Goal Outcome Evaluation:    Plan of Care Reviewed With: patient          Problem: Plan of Care - These are the overarching goals to be used throughout the patient stay.    Goal: Plan of Care Review  Description: The Plan of Care Review/Shift note should be completed every shift.  The Outcome Evaluation is a brief statement about your assessment that the patient is improving, declining, or no change.  This information will be displayed automatically on your shift note.  Outcome: Progressing        Pt BG this morning was 107. Pt on carb counting. Coverage was given. Pt was up and visible in the milieu. Pt is isolative and withdrawn from others but more brighten upon approach. Pt denies SI/HI/SIB, depression, anxiety and hallucinations. Pt is contract for safety. Pt is voluntary. On SI and fall precaution. No medications side effects observed or reported. Pt need to be reminded to use the toilet Q2H. Continue to encourage fluids intake. Ate 100% of breakfast. Hygiene is appropriate. Blood pressure (!) 179/100, pulse 76, temperature 97.3  F (36.3  C), temperature source Oral, resp. rate 16, height 1.524 m (5'), weight 111.8 kg (246 lb 8 oz), last menstrual period 01/06/2015, SpO2 97 %, not currently breastfeeding.

## 2023-09-28 PROBLEM — R10.31 ABDOMINAL PAIN, RIGHT LOWER QUADRANT: Status: RESOLVED | Noted: 2017-07-13 | Resolved: 2023-09-28

## 2023-09-28 PROBLEM — N10 ACUTE PYELONEPHRITIS: Status: RESOLVED | Noted: 2022-08-17 | Resolved: 2023-09-28

## 2023-09-28 PROBLEM — Z78.0 ASYMPTOMATIC POSTMENOPAUSAL STATUS: Status: RESOLVED | Noted: 2017-06-28 | Resolved: 2023-09-28

## 2023-09-28 PROBLEM — G89.29 CHRONIC PAIN: Status: RESOLVED | Noted: 2022-04-20 | Resolved: 2023-09-28

## 2023-09-28 PROBLEM — N17.9 ACUTE KIDNEY INJURY (H): Status: RESOLVED | Noted: 2021-09-25 | Resolved: 2023-09-28

## 2023-09-28 PROBLEM — E11.9 TYPE 2 DIABETES MELLITUS WITHOUT COMPLICATION, WITH LONG-TERM CURRENT USE OF INSULIN (H): Status: RESOLVED | Noted: 2021-08-10 | Resolved: 2023-09-28

## 2023-09-28 PROBLEM — Z12.4 CERVICAL CANCER SCREENING: Status: RESOLVED | Noted: 2018-06-01 | Resolved: 2023-09-28

## 2023-09-28 PROBLEM — U07.1 2019 NOVEL CORONAVIRUS DISEASE (COVID-19): Status: RESOLVED | Noted: 2021-09-25 | Resolved: 2023-09-28

## 2023-09-28 PROBLEM — Z79.4 TYPE 2 DIABETES MELLITUS WITHOUT COMPLICATION, WITH LONG-TERM CURRENT USE OF INSULIN (H): Status: RESOLVED | Noted: 2021-08-10 | Resolved: 2023-09-28

## 2023-10-03 ENCOUNTER — OFFICE VISIT (OUTPATIENT)
Dept: PHARMACY | Facility: CLINIC | Age: 63
End: 2023-10-03
Payer: COMMERCIAL

## 2023-10-03 ENCOUNTER — OFFICE VISIT (OUTPATIENT)
Dept: FAMILY MEDICINE | Facility: CLINIC | Age: 63
End: 2023-10-03
Payer: COMMERCIAL

## 2023-10-03 VITALS
DIASTOLIC BLOOD PRESSURE: 60 MMHG | SYSTOLIC BLOOD PRESSURE: 132 MMHG | HEART RATE: 65 BPM | WEIGHT: 245.8 LBS | HEIGHT: 61 IN | TEMPERATURE: 97.2 F | BODY MASS INDEX: 46.41 KG/M2 | RESPIRATION RATE: 21 BRPM | OXYGEN SATURATION: 96 %

## 2023-10-03 DIAGNOSIS — M79.10 MYALGIA: ICD-10-CM

## 2023-10-03 DIAGNOSIS — Z79.4 TYPE 2 DIABETES MELLITUS WITH MILD NONPROLIFERATIVE RETINOPATHY WITHOUT MACULAR EDEMA, WITH LONG-TERM CURRENT USE OF INSULIN, UNSPECIFIED LATERALITY (H): ICD-10-CM

## 2023-10-03 DIAGNOSIS — Z23 NEED FOR PROPHYLACTIC VACCINATION AND INOCULATION AGAINST INFLUENZA: ICD-10-CM

## 2023-10-03 DIAGNOSIS — M81.0 OSTEOPOROSIS WITHOUT CURRENT PATHOLOGICAL FRACTURE, UNSPECIFIED OSTEOPOROSIS TYPE: ICD-10-CM

## 2023-10-03 DIAGNOSIS — E11.22 TYPE 2 DIABETES MELLITUS WITH STAGE 3A CHRONIC KIDNEY DISEASE, WITH LONG-TERM CURRENT USE OF INSULIN (H): Primary | ICD-10-CM

## 2023-10-03 DIAGNOSIS — N18.31 TYPE 2 DIABETES MELLITUS WITH STAGE 3A CHRONIC KIDNEY DISEASE, WITH LONG-TERM CURRENT USE OF INSULIN (H): Primary | ICD-10-CM

## 2023-10-03 DIAGNOSIS — Z79.4 TYPE 2 DIABETES MELLITUS WITH STAGE 3A CHRONIC KIDNEY DISEASE, WITH LONG-TERM CURRENT USE OF INSULIN (H): Primary | ICD-10-CM

## 2023-10-03 DIAGNOSIS — F25.1 SCHIZOAFFECTIVE DISORDER, DEPRESSIVE TYPE (H): Primary | ICD-10-CM

## 2023-10-03 DIAGNOSIS — Z23 NEED FOR COVID-19 VACCINE: ICD-10-CM

## 2023-10-03 DIAGNOSIS — F25.0 SCHIZOAFFECTIVE DISORDER, BIPOLAR TYPE (H): ICD-10-CM

## 2023-10-03 DIAGNOSIS — R52 PAIN: ICD-10-CM

## 2023-10-03 DIAGNOSIS — E11.3299 TYPE 2 DIABETES MELLITUS WITH MILD NONPROLIFERATIVE RETINOPATHY WITHOUT MACULAR EDEMA, WITH LONG-TERM CURRENT USE OF INSULIN, UNSPECIFIED LATERALITY (H): ICD-10-CM

## 2023-10-03 PROCEDURE — 99607 MTMS BY PHARM ADDL 15 MIN: CPT | Performed by: PHARMACIST

## 2023-10-03 PROCEDURE — 99606 MTMS BY PHARM EST 15 MIN: CPT | Performed by: PHARMACIST

## 2023-10-03 PROCEDURE — 90471 IMMUNIZATION ADMIN: CPT | Performed by: FAMILY MEDICINE

## 2023-10-03 PROCEDURE — 99214 OFFICE O/P EST MOD 30 MIN: CPT | Mod: 25 | Performed by: FAMILY MEDICINE

## 2023-10-03 PROCEDURE — 90682 RIV4 VACC RECOMBINANT DNA IM: CPT | Performed by: FAMILY MEDICINE

## 2023-10-03 RX ORDER — ALENDRONATE SODIUM 70 MG/1
70 TABLET ORAL
Qty: 4 TABLET | Refills: 11 | Status: SHIPPED | OUTPATIENT
Start: 2023-10-03

## 2023-10-03 RX ORDER — CLONAZEPAM 0.5 MG/1
0.5 TABLET ORAL AT BEDTIME
COMMUNITY
End: 2024-01-06

## 2023-10-03 ASSESSMENT — COLUMBIA-SUICIDE SEVERITY RATING SCALE - C-SSRS
2. IN THE PAST MONTH, HAVE YOU ACTUALLY HAD ANY THOUGHTS OF KILLING YOURSELF?: YES
5. IN THE PAST MONTH, HAVE YOU STARTED TO WORK OUT OR WORKED OUT THE DETAILS OF HOW TO KILL YOURSELF? DO YOU INTEND TO CARRY OUT THIS PLAN?: NO
3. IN THE PAST MONTH, HAVE YOU BEEN THINKING ABOUT HOW YOU MIGHT KILL YOURSELF?: NO
5. IN THE PAST MONTH, HAVE YOU STARTED TO WORK OUT OR WORKED OUT THE DETAILS OF HOW TO KILL YOURSELF? DO YOU INTEND TO CARRY OUT THIS PLAN?: NO
1. WITHIN THE PAST MONTH, HAVE YOU WISHED YOU WERE DEAD OR WISHED YOU COULD GO TO SLEEP AND NOT WAKE UP?: YES
6. HAVE YOU EVER DONE ANYTHING, STARTED TO DO ANYTHING, OR PREPARED TO DO ANYTHING TO END YOUR LIFE?: YES, LIFETIME

## 2023-10-03 ASSESSMENT — PAIN SCALES - GENERAL: PAINLEVEL: SEVERE PAIN (7)

## 2023-10-03 ASSESSMENT — PATIENT HEALTH QUESTIONNAIRE - PHQ9
SUM OF ALL RESPONSES TO PHQ QUESTIONS 1-9: 23
SUM OF ALL RESPONSES TO PHQ QUESTIONS 1-9: 23
10. IF YOU CHECKED OFF ANY PROBLEMS, HOW DIFFICULT HAVE THESE PROBLEMS MADE IT FOR YOU TO DO YOUR WORK, TAKE CARE OF THINGS AT HOME, OR GET ALONG WITH OTHER PEOPLE: NOT DIFFICULT AT ALL

## 2023-10-03 NOTE — PROGRESS NOTES
Medication Therapy Management (MTM) Encounter    ASSESSMENT:                            Medication Adherence/Access: No issues identified    Schizoaffective:   Follow-up with psychiatry as scheduled    Type 2 Diabetes:  A1c at goal <8%. Continue Lantus at current dose with tapering prednisone.     Osteoporosis:  Restart alendronate, due for DEXA in 2024    PMR:  Improved with prednisone    PLAN:                            Restart alendronate 70mg every 7 days - ordered by PCP    Follow-up: 1 month    SUBJECTIVE/OBJECTIVE:                          Nena Tang is a 63 year old female coming in for a follow-up visit from 6/6/23. Today's visit is a co-visit with PCP.  Accompanied by group home staff [facility].    Reason for visit: medication recheck.    Allergies/ADRs: Reviewed in chart  Past Medical History: Reviewed in chart  Tobacco: She reports that she has never smoked. She has never used smokeless tobacco.  Alcohol: not currently using      Medication Adherence/Access: no issues reported  Medications administered by group home staff.         Schizoaffective:  Quetiapine 125mg at bedtime - recently dose increased  Paliperidone ER 12mg at bedtime  Escitalopram 20mg daily  Hydroxyzine 25mg at bedtime  Trazodone 100mg at bedtime  Clonazepam 0.5mg at bedtime  Amantadine - dose reduced to 100mg daily  Cyproheptadine - stopped during hospitalization, ineffective.  Reports she is still having suicidal thoughts. Group home staff reports that Nena had previously told them she was doing better with the medicine changes from her hospitalization.  Continues to have difficulty sleeping, increased nightmares recently.   She had previously been lost to follow-up with [facility] psychiatry.   Established care with a new psychiatrist last week and has a follow-up scheduled later this week.       Diabetes   Type 2 Diabetes:    Lantus 20 units twice daily  Humalog 8 units with meals +sliding scale 3u per 50mg/dL starting glucose 151  and max 23u  Trulicity 3 mg weekly.  Side effects: constipation, manages with senna-docusate.    Blood sugar monitoring: Continuous Glucose Monitor (unable to download today)  Reports no hypoglycemia. No severe hyperglycemia.  Current diabetes symptoms: none  Diet/Exercise: did not review today     Eye exam is up to date  Foot exam: due  Urine Albumin:   Lab Results   Component Value Date    UMALCR 10.19 08/17/2022      Lab Results   Component Value Date    A1C 7.0 (H) 09/21/2023     Osteoporosis:   calcium 600mg daily  alendronate (Fosamax) 70mg weekly (has been on current therapy 1 year) - on hold from hospitalization  Patient is not experiencing side effects.  DEXA History: 7/14/22  Lumbar spine T-score in region of L1-L4 (L3) = -2.2      HIPS:  Mean total hip T-score: -1.5  Mean total hip percent change: -10.8%      Left femoral neck T-score = -3.1  Right femoral neck T-score= -1.7     Last vitamin D level:  Lab Results   Component Value Date    VITDT 31 09/20/2023    VITDT 27 08/17/2022    VITDT 39 12/22/2019    VITDT 43 09/06/2018    VITDT 69 11/07/2017      PMR:  Prednisone 7.5mg daily and tapering  Pain has improved. She does has some pain returning in her back but full body pain has not returned.      Today's Vitals: LMP 01/06/2015 (Exact Date)   ----------------  Post Discharge Medication Reconciliation Status: discharge medications reconciled and changed, per note/orders.    I spent 30 minutes with this patient today. All changes were made via collaborative practice agreement with Albino Rainey MD. A copy of the visit note was provided to the patient's provider(s).    A summary of these recommendations was given to the patient (see AVS from today's appointment with PCP).    Eugenia De Jesus, PharmD, BCACP   Medication Therapy Management Pharmacist   [facility]  [phone]          Medication Therapy Recommendations  Osteoporosis without current  pathological fracture, unspecified osteoporosis type    Current Medication: alendronate (FOSAMAX) 70 MG tablet   Rationale: Order duration inappropriate - Dosage too low - Effectiveness   Recommendation: Start Medication   Status: Accepted per Provider

## 2023-10-03 NOTE — PROGRESS NOTES
Assessment & Plan     Type 2 diabetes mellitus with stage 3a chronic kidney disease, with long-term current use of insulin (H)  Recent control has been worse with the hemoglobin A1c during her hospitalization of 7.0.  She has follow-up with endocrinology in about 3 weeks.  I suspect this is most likely steroid related and will improve as she tapers off the prednisone.    Myalgia  The status of her myalgias at this time is unclear.  She does report an improvement in her whole body pain that she was experiencing previously but is tapering off the steroids.  She was seen by rheumatology in the hospital who was not convinced regarding a diagnosis of polymyalgia rheumatica and has follow-up with rheumatology scheduled for November.  Prior to that she will be tapering off the steroids and I have asked to see her back in a couple weeks for recheck.    Schizoaffective disorder, bipolar type (H)  Stable at this time and has established care with a psychiatrist at [facility].  Will be seeing them next week and I have asked the team at the group home to let me know if there are any medication changes.    Osteoporosis without current pathological fracture, unspecified osteoporosis type  DEXA scan previously had shown osteoporosis for which she was on alendronate.  This appeared to have been discontinued at some point in time for unclear reasons and will restart at this time.  - alendronate (FOSAMAX) 70 MG tablet; Take 1 tablet (70 mg) by mouth every 7 days    Need for prophylactic vaccination and inoculation against influenza  Flu shot was provided today.    Need for COVID-19 vaccine  COVID-vaccine was discussed but not provided today because she recently had COVID infection.           MED REC REQUIRED  Post Medication Reconciliation Status: discharge medications reconciled and changed, per note/orders  Depression Screening Follow Up        10/3/2023     9:15 AM   PHQ   PHQ-9 Total Score 23   Q9: Thoughts of  better off dead/self-harm past 2 weeks Nearly every day   F/U: Thoughts of suicide or self-harm Yes   F/U: Self harm-plan No   F/U: Self-harm action No   F/U: Safety concerns No               10/3/2023    11:50 AM   C-SSRS (Brief [place]al)   Within the last month, have you wished you were dead or wished you could go to sleep and not wake up? Yes   Within the last month, have you had any actual thoughts of killing yourself? Yes   Within the last month, have you been thinking about how you might do this? No   Within the last month, have you had these thoughts and had some intention of acting on them? No   Within the last month, have you started to work out or worked out the details of how to kill yourself with the intent to carry out this plan? No   Within the last month, have you ever done anything, started to do anything, or prepared to do anything to end your life? Yes, lifetime         Follow Up       Follow Up Actions Taken  Crisis resource information provided in the After Visit Summary  Referred patient back to mental health provider  Patient to follow up with PCP.  Clinic staff to schedule appointment if able.    Discussed the following ways the patient can remain in a safe environment:   Patient lives in a group home and has good supervision.  FUTURE APPOINTMENTS:       - Follow-up visit in 2 to 3 weeks    Albino Rainey MD  [facility]    Surjit Barrett is a 63 year old, presenting for the following health issues:  Follow Up (On aches and pain)      10/3/2023     9:26 AM   Additional Questions   Roomed by heath   Accompanied by pca Miracle)       History of Present Illness       Reason for visit:  Mental health    She eats 0-1 servings of fruits and vegetables daily.She consumes 2 sweetened beverage(s) daily.She exercises with enough effort to increase her heart rate 60 or more minutes per day.  She exercises with enough effort to increase her heart rate 3 or less days  per week.   She is taking medications regularly.           Hospital Follow-up Visit:    Hospital/Nursing Home/IP Rehab Facility: [facility]  Date of Admission: September 20, 2023  Date of Discharge: September 26, 2023  Reason(s) for Admission: Suicidal ideation    Was your hospitalization related to COVID-19? No   Problems taking medications regularly:  None  Medication changes since discharge: None  Problems adhering to non-medication therapy:  None    Summary of hospitalization:  [facility] hospital discharge summary reviewed  Diagnostic Tests/Treatments reviewed.  Follow up needed: Medical follow-up and psychiatric follow-up  Other Healthcare Providers Involved in Patient s Care:         Specialist appointment - Endocrinology/rheumatology  Update since discharge: stable.         Plan of care communicated with patient and caregiver             Patient is seen today for follow-up.  She had a hospitalization from September 20 through September 26 due to suicidal ideation.  Psychiatry follow-up last week with [facility] did not appear to make any changes.  [facility] pharmacy was involved today for medication reconciliation.  Patient was seen inpatient with endocrinology and rheumatology consultations.  She has endocrinology follow-up scheduled for later this month and rheumatology follow-up in November.  Rheumatology did not feel a diagnosis of polymyalgia rheumatica was very likely and they have recommended a taper off prednisone that they consists of being her off the medication.  As far as her function at the group home she is not requiring the degree of assistance that she was previously which is an improvement.  She denies any of the previous complaints of widespread body pain, just localized to the back at this time.      Review of Systems   Constitutional, HEENT, cardiovascular, pulmonary, gi and gu systems are negative, except as otherwise  "noted.      Objective    /60   Pulse 65   Temp 97.2  F (36.2  C) (Temporal)   Resp 21   Ht 1.54 m (5' 0.63\")   Wt 111.5 kg (245 lb 12.8 oz)   LMP 01/06/2015 (Exact Date)   SpO2 96%   BMI 47.01 kg/m    Body mass index is 47.01 kg/m .  Physical Exam   GENERAL: healthy, alert, no distress, and obese  EYES: Eyes grossly normal to inspection, PERRL and conjunctivae and sclerae normal  NECK: no adenopathy, no asymmetry, masses, or scars and thyroid normal to palpation  RESP: lungs clear to auscultation - no rales, rhonchi or wheezes  CV: regular rates and rhythm, normal S1 S2, no S3 or S4, no murmur, click or rub, peripheral pulses strong, and 2+ bilateral lower extremity pitting edema to mid calf  SKIN: no suspicious lesions or rashes  NEURO: Normal strength and tone, mentation intact and speech normal  PSYCH: mentation appears normal and affect flat                      "

## 2023-10-10 DIAGNOSIS — M54.50 CHRONIC RIGHT-SIDED LOW BACK PAIN WITHOUT SCIATICA: ICD-10-CM

## 2023-10-10 DIAGNOSIS — F25.1 SCHIZOAFFECTIVE DISORDER, DEPRESSIVE TYPE (H): ICD-10-CM

## 2023-10-10 DIAGNOSIS — G89.29 CHRONIC RIGHT-SIDED LOW BACK PAIN WITHOUT SCIATICA: ICD-10-CM

## 2023-10-10 DIAGNOSIS — I25.119 CORONARY ARTERY DISEASE INVOLVING NATIVE HEART WITH ANGINA PECTORIS, UNSPECIFIED VESSEL OR LESION TYPE (H): ICD-10-CM

## 2023-10-10 RX ORDER — LOSARTAN POTASSIUM 100 MG/1
TABLET ORAL
Qty: 28 TABLET | Refills: 11 | Status: SHIPPED | OUTPATIENT
Start: 2023-10-10 | End: 2024-09-10

## 2023-10-10 RX ORDER — TRAZODONE HYDROCHLORIDE 100 MG/1
100 TABLET ORAL AT BEDTIME
Qty: 28 TABLET | Refills: 0 | Status: SHIPPED | OUTPATIENT
Start: 2023-10-10 | End: 2023-11-07

## 2023-10-10 RX ORDER — GABAPENTIN 300 MG/1
300 CAPSULE ORAL AT BEDTIME
Qty: 28 CAPSULE | Refills: 1 | Status: SHIPPED | OUTPATIENT
Start: 2023-10-10 | End: 2023-12-05

## 2023-10-10 NOTE — TELEPHONE ENCOUNTER
"Requested Prescriptions   Pending Prescriptions Disp Refills    losartan (COZAAR) 100 MG tablet [Pharmacy Med Name: Losartan Potassium 100MG TABS] 28 tablet 11     Sig: TAKE 1 TABLET BY MOUTH DAILY       Angiotensin-II Receptors Failed - 10/10/2023  8:55 AM        Failed - Normal serum creatinine on file in past 12 months     Recent Labs   Lab Test 09/26/23  0747 05/16/19  0658 05/15/19  1834   CR 1.19*   < >  --    CREAT  --   --  0.9    < > = values in this interval not displayed.       Ok to refill medication if creatinine is low          Passed - Last blood pressure under 140/90 in past 12 months     BP Readings from Last 3 Encounters:   10/03/23 132/60   09/27/23 (!) 179/100   09/20/23 (!) 178/88                 Passed - Recent (12 mo) or future (30 days) visit within the authorizing provider's specialty     Patient has had an office visit with the authorizing provider or a provider within the authorizing providers department within the previous 12 mos or has a future within next 30 days. See \"Patient Info\" tab in inbasket, or \"Choose Columns\" in Meds & Orders section of the refill encounter.              Passed - Medication is active on med list        Passed - Patient is age 18 or older        Passed - No active pregnancy on record        Passed - Normal serum potassium on file in past 12 months     Recent Labs   Lab Test 09/26/23  0747   POTASSIUM 4.2                    Passed - No positive pregnancy test in past 12 months          gabapentin (NEURONTIN) 300 MG capsule [Pharmacy Med Name: Gabapentin 300MG CAPS] 28 capsule      Sig: TAKE 1 CAPSULE BY MOUTH AT BEDTIME       There is no refill protocol information for this order       traZODone (DESYREL) 100 MG tablet [Pharmacy Med Name: traZODone HCl 100MG TABS*] 28 tablet      Sig: TAKE 1 TABLET BY MOUTH AT BEDTIME       Serotonin Modulators Passed - 10/10/2023  8:55 AM        Passed - Recent (12 mo) or future (30 days) visit within the authorizing provider's " "specialty     Patient has had an office visit with the authorizing provider or a provider within the authorizing providers department within the previous 12 mos or has a future within next 30 days. See \"Patient Info\" tab in inbasket, or \"Choose Columns\" in Meds & Orders section of the refill encounter.              Passed - Medication is active on med list        Passed - Patient is age 18 or older        Passed - No active pregnancy on record        Passed - No positive pregnancy test in past 12 months          Prescription approved per [facility] Refill Protocol.     Routing refill request to provider for review/approval because:  Drug not on the [facility] refill protocol     Pt was last seen on 10/17/23    Monique Peters RN  [facility]      "

## 2023-10-13 DIAGNOSIS — F51.04 PSYCHOPHYSIOLOGICAL INSOMNIA: ICD-10-CM

## 2023-10-13 DIAGNOSIS — F25.9 SCHIZOAFFECTIVE DISORDER, UNSPECIFIED TYPE (H): ICD-10-CM

## 2023-10-13 DIAGNOSIS — F32.A DEPRESSION WITH SUICIDAL IDEATION: ICD-10-CM

## 2023-10-13 DIAGNOSIS — R44.0 VERBAL AUDITORY HALLUCINATION: ICD-10-CM

## 2023-10-13 DIAGNOSIS — Z91.51 HISTORY OF SUICIDAL BEHAVIOR: ICD-10-CM

## 2023-10-13 DIAGNOSIS — F43.10 PTSD (POST-TRAUMATIC STRESS DISORDER): ICD-10-CM

## 2023-10-13 DIAGNOSIS — R45.851 DEPRESSION WITH SUICIDAL IDEATION: ICD-10-CM

## 2023-10-13 DIAGNOSIS — G47.00 PERSISTENT INSOMNIA: ICD-10-CM

## 2023-10-13 RX ORDER — QUETIAPINE FUMARATE 25 MG/1
TABLET, FILM COATED ORAL
Qty: 150 TABLET | Refills: 0 | Status: SHIPPED | OUTPATIENT
Start: 2023-10-13 | End: 2023-11-07

## 2023-10-13 RX ORDER — HYDROXYZINE PAMOATE 25 MG/1
CAPSULE ORAL
Qty: 28 CAPSULE | Refills: 0 | Status: SHIPPED | OUTPATIENT
Start: 2023-10-13 | End: 2023-11-17

## 2023-10-13 RX ORDER — PALIPERIDONE 6 MG/1
12 TABLET, EXTENDED RELEASE ORAL AT BEDTIME
Qty: 60 TABLET | Refills: 11 | Status: SHIPPED | OUTPATIENT
Start: 2023-10-13

## 2023-10-13 NOTE — TELEPHONE ENCOUNTER
Refills approved via CPA with Dr. Rainey.     Butch Wing, PharmD  Medication Therapy Management Pharmacist  [facility]  Office phone: [phone]

## 2023-10-16 NOTE — PROGRESS NOTES
Outcome for 10/16/23 12:22 PM: Digital [employer] message sent  Nan Elizabeth LPN   Outcome for 10/20/23 3:35 PM: Left Voicemail   Nan Elizabeth LPN   Outcome for 10/23/23 10:10 AM:  Pt requests call to assisted living to obtain blood sugar readings.  Outcome for 10/23/23 10:10 AM: Left Voicemail   Liliana Tirado MA  Outcome for 10/24/23 9:46 AM: Left Voicemail   Agatha Ziegler MA        Patient is showing 5/5 MNCM met.   Nan Elizabeth LPN

## 2023-10-20 ENCOUNTER — TELEPHONE (OUTPATIENT)
Dept: ENDOCRINOLOGY | Facility: CLINIC | Age: 63
End: 2023-10-20
Payer: COMMERCIAL

## 2023-10-20 NOTE — TELEPHONE ENCOUNTER
Called patient and left voicemail. Patient has appointment on 10/24/2023. Need to collect 14 days of blood sugar readings if patient is using meter, or if patient is using CGM, need to get patients device uploaded to retrieve report for provider to review.    Nan Elizabeth LPN 10/20/23 3:34 PM

## 2023-10-23 NOTE — TELEPHONE ENCOUNTER
Spoke with pt regarding upcoming virtual visit - pt requests we call assisted living for glucose data.     Called and left message at pts assisted living to call back when available.     Liliana Tirado MA

## 2023-10-23 NOTE — TELEPHONE ENCOUNTER
Spoke with DAVIN Neves - she will fax BG data and MAR - will forward to provider when received.     Liliana Tirado MA

## 2023-10-24 ENCOUNTER — MEDICAL CORRESPONDENCE (OUTPATIENT)
Dept: HEALTH INFORMATION MANAGEMENT | Facility: CLINIC | Age: 63
End: 2023-10-24

## 2023-10-24 ENCOUNTER — VIRTUAL VISIT (OUTPATIENT)
Dept: ENDOCRINOLOGY | Facility: CLINIC | Age: 63
End: 2023-10-24
Attending: PHYSICIAN ASSISTANT
Payer: COMMERCIAL

## 2023-10-24 DIAGNOSIS — E11.9 TYPE 2 DIABETES MELLITUS WITHOUT COMPLICATION, WITH LONG-TERM CURRENT USE OF INSULIN (H): ICD-10-CM

## 2023-10-24 DIAGNOSIS — N18.30 TYPE 2 DIABETES MELLITUS WITH STAGE 3 CHRONIC KIDNEY DISEASE, WITH LONG-TERM CURRENT USE OF INSULIN, UNSPECIFIED WHETHER STAGE 3A OR 3B CKD (H): ICD-10-CM

## 2023-10-24 DIAGNOSIS — E11.22 TYPE 2 DIABETES MELLITUS WITH STAGE 3 CHRONIC KIDNEY DISEASE, WITH LONG-TERM CURRENT USE OF INSULIN, UNSPECIFIED WHETHER STAGE 3A OR 3B CKD (H): ICD-10-CM

## 2023-10-24 DIAGNOSIS — Z79.4 TYPE 2 DIABETES MELLITUS WITH STAGE 3 CHRONIC KIDNEY DISEASE, WITH LONG-TERM CURRENT USE OF INSULIN, UNSPECIFIED WHETHER STAGE 3A OR 3B CKD (H): ICD-10-CM

## 2023-10-24 DIAGNOSIS — E66.813 CLASS 3 SEVERE OBESITY DUE TO EXCESS CALORIES WITH SERIOUS COMORBIDITY AND BODY MASS INDEX (BMI) OF 45.0 TO 49.9 IN ADULT (H): ICD-10-CM

## 2023-10-24 DIAGNOSIS — Z79.4 TYPE 2 DIABETES MELLITUS WITHOUT COMPLICATION, WITH LONG-TERM CURRENT USE OF INSULIN (H): ICD-10-CM

## 2023-10-24 DIAGNOSIS — E66.01 CLASS 3 SEVERE OBESITY DUE TO EXCESS CALORIES WITH SERIOUS COMORBIDITY AND BODY MASS INDEX (BMI) OF 45.0 TO 49.9 IN ADULT (H): ICD-10-CM

## 2023-10-24 DIAGNOSIS — E66.01 MORBID OBESITY (H): Primary | ICD-10-CM

## 2023-10-24 PROCEDURE — 99205 OFFICE O/P NEW HI 60 MIN: CPT | Mod: 95 | Performed by: PHYSICIAN ASSISTANT

## 2023-10-24 RX ORDER — TIRZEPATIDE 5 MG/.5ML
5 INJECTION, SOLUTION SUBCUTANEOUS
Qty: 2 ML | Refills: 1 | Status: SHIPPED | OUTPATIENT
Start: 2023-10-24 | End: 2024-01-16 | Stop reason: SINTOL

## 2023-10-24 NOTE — TELEPHONE ENCOUNTER
Called RN at assisted living and [facility] clinic needs BG readings and MAR for pt's upcoming appointment. Clinic fax number left in voicemail. Agatha Ziegler CMA on 10/24/2023 at 9:46 AM

## 2023-10-24 NOTE — LETTER
10/24/2023       RE: Nena Tang  [address] [place] [address]     Dear Colleague,    Thank you for referring your patient, Nena Tang, to the [facility] at [facility]. Please see a copy of my visit note below.    Outcome for 10/16/23 12:22 PM: Acertiv message sent  Nan Elizabeth LPN   Outcome for 10/20/23 3:35 PM: Left Voicemail   Nan Elizabeth LPN   Outcome for 10/23/23 10:10 AM:  Pt requests call to assisted living to obtain blood sugar readings.  Outcome for 10/23/23 10:10 AM: Left Voicemail   Liliana Tirado MA  Outcome for 10/24/23 9:46 AM: Left Voicemail   Agatha Ziegler MA        Patient is showing 5/5 MNCM met.   Nan Elizabeth LPN                   Diabetes Consult Note  October 24, 2023        Nena Tang  is a 63 year old female who has a past medical history of Acute respiratory failure with hypoxia (H), CAD (coronary artery disease), Chronic low back pain, Cocaine abuse, in remission (H), Fecal urgency, History of heroin abuse (H), Hyperlipidemia LDL goal <100, Hypertension, Illiterate, Irritable bowel syndrome, Left cataract, Migraine, Migraine headache, Moderate major depression (H), Noncompliance with medication regimen, Obesity, CINDY (obstructive sleep apnea), CINDY (obstructive sleep apnea)- mild AHI 10.3, Osteopenia, Pneumonia of right lower lobe due to infectious organism, Schizoaffective disorder, depressive type (H), Sepsis (H), Suicidal intent, Takotsubo cardiomyopathy, Type 2 diabetes mellitus (H), Uterine cancer (H), and Verbal auditory hallucination. She is here for follow-up of diabetes.            HPI:  Nena was recently hospitalized at [facility] for suicidal ideation September 20 through September 26.  She was followed by the inpatient diabetes service for her type 2 diabetes and was discharged on Lantus 20 units twice daily which was decreased following a steroid  taper, NovoLog 1 unit per 7 g of carbohydrate, and NovoLog 1 unit per 25 mg/dL greater than 140 3 times daily and greater than 200 nightly.  She had previously been on Trulicity but this was held while she was hospitalized.      Interim:    Please see table below for symptoms since Nena was last seen.    Patient Supplied Answers To Diabetic Questionnaire         No data to display                 Today:  Nena is now back at her group home.  She thinks things are going well but her blood sugars are high.  She has resumed taking her Trulicity and has no concerns with nausea, abdominal pain or other concerns.  She has a pretty good appetite and   Nena has had diabetes for many years, since at least 2009.  Prior to her most recent hospitalization she was taking Lantus 33 units twice daily, Humalog 8 units 3 times a day with meals and a sliding scale of 3 units per 50 mg/dL greater than 150 together with Trulicity 3 mg every week.  Her diabetes is known to be complicated by retinopathy chronic kidney disease and neuropathy.  She reports that she does know when her blood sugar is low and she will get shaky once her blood sugar gets around 90.  Nena reports that she is active on the days that she goes to day programming.  She goes to day program in 3 to 4 days/week and there she will do walking as well as some crafts and other activities.  Nena is unable to tell me who takes care of her diabetes.  She goes to her doctor's office, there is no visiting provider at her group home, but the nurse there helps her a lot.      I was able to speak with staff at Nena's home.  She generally will come home from day programming between 2 and 330 take a nap and then have her dinner between 6 and 8 PM.  Her blood sugars are higher the days she goes to day programming, per staff as they have a lot of snacks there.  Her Lantus dose prior to hospitalization was 33 units twice daily, but has now been reduced to 20 units  twice daily.  She continues to take 8 units per meal plus a sliding scale of 3 units per 50 greater than 150.  She does not get any sliding scale in the evening because she eats so late and they do not have orders for this.  They do have orders for 8 unit snack if she advises them of a snack she eats in her room in the evening, no staff states she rarely does this.    Current Diabetes Medications:  Lantus 20 units twice daily  Trulicity 3 mg every Tuesday  Humalog 8 units per meal  Humalog 3 units per 50 greater than 150 3 times daily AC.        We reviewed glucometer/CGMS data together.  It revealed:  Fasting blood sugars: 95, 182 155 175 159 297 188 139 322 270 246 149 230 246  Midday blood sugars: 223 156 269 169 249 140  Evening blood sugars: 220 185 213 202 133 218 203 201 213    Blood sugars are on occasion at goal fasting as well as midday, they are generally above goal in the evening.      History of Diabetes monitoring and complications/ prevention:  CAD: yes  Last eye exam results: Last exam appears to be in 2020  Neuropathy: yes  Nephropathy:CKD 3  HTN: yes On metoprolol and Cozaar  On statin: Lipitor 80 mg daily  On ASA:yes  Depression:yes      Nena's PMH, PSH and allergies were reviewed today and pertinent information is summarized above.    Nena's pertinent social and family history are also reviewed today and pertinent information is summarized above.  Also noted is:Lives in group home.  Has lost many family members in recent years.      Current Outpatient Medications   Medication    acetaminophen (TYLENOL) 500 MG tablet    Alcohol Swabs (EASY TOUCH ALCOHOL PREP MEDIUM) 70 % PADS    alendronate (FOSAMAX) 70 MG tablet    amantadine (SYMMETREL) 100 MG capsule    aspirin (ASPIRIN LOW DOSE) 81 MG EC tablet    atorvastatin (LIPITOR) 80 MG tablet    blood glucose (NO BRAND SPECIFIED) test strip    calcium carbonate (OS-ALLEN) 1500 (600 Ca) MG tablet    clonazePAM (KLONOPIN) 0.5 MG tablet    Continuous  Blood Gluc Sensor (DEXCOM G6 SENSOR) MISC    Continuous Blood Gluc Transmit (DEXCOM G6 TRANSMITTER) MISC    diclofenac (VOLTAREN) 1 % topical gel    escitalopram (LEXAPRO) 20 MG tablet    estradiol (ESTRACE VAGINAL) 0.1 MG/GM vaginal cream    gabapentin (NEURONTIN) 300 MG capsule    hydrOXYzine (VISTARIL) 25 MG capsule    insulin glargine (LANTUS PEN) 100 UNIT/ML pen    insulin lispro (HUMALOG KWIKPEN) 100 UNIT/ML (1 unit dial) KWIKPEN    Lidocaine (LIDOCARE) 4 % Patch    losartan (COZAAR) 100 MG tablet    magnesium 250 MG tablet    metoprolol succinate ER (TOPROL XL) 50 MG 24 hr tablet    mirabegron (MYRBETRIQ) 50 MG 24 hr tablet    NOVOFINE AUTOCOVER PEN NEEDLE 30G X 8 MM miscellaneous    nystatin (MYCOSTATIN) 083170 UNIT/GM external powder    ondansetron (ZOFRAN) 4 MG tablet    OneTouch Delica Lancets 33G MISC    order for DME    order for DME    paliperidone ER (INVEGA) 6 MG 24 hr tablet    pantoprazole (PROTONIX) 40 MG EC tablet    predniSONE (DELTASONE) 2.5 MG tablet    predniSONE (DELTASONE) 2.5 MG tablet    predniSONE (DELTASONE) 5 MG tablet    QUEtiapine (SEROQUEL) 25 MG tablet    senna-docusate (SENOKOT-S/PERICOLACE) 8.6-50 MG tablet    thin (NO BRAND SPECIFIED) lancets    traZODone (DESYREL) 100 MG tablet    TRULICITY 3 MG/0.5ML SOPN    vitamin C (ASCORBIC ACID) 500 MG tablet    zinc oxide (DESITIN) 20 % external ointment     Current Facility-Administered Medications   Medication    apraclonidine (IOPIDINE) 1 % ophthalmic solution 1 drop    lidocaine 1 % injection 4 mL    triamcinolone (KENALOG-40) injection 40 mg         ROS:   Reports good physical activity tolerance.  Denies any pedal lesions or vision changes or concerns. Denies any other acute concerns except as noted above.      Exam:    LMP 01/06/2015 (Exact Date)   Wt Readings from Last 10 Encounters:   10/03/23 111.5 kg (245 lb 12.8 oz)   09/26/23 111.8 kg (246 lb 8 oz)   09/18/23 110.2 kg (243 lb)   08/07/23 110.2 kg (243 lb)   07/25/23 110.2 kg  "(243 lb)   07/11/23 108.9 kg (240 lb)   06/25/23 105.2 kg (232 lb)   06/22/23 108.4 kg (239 lb)   06/20/23 108.4 kg (239 lb)   06/06/23 108.9 kg (240 lb)     Estimated body mass index is 47.01 kg/m  as calculated from the following:    Height as of 10/3/23: 1.54 m (5' 0.63\").    Weight as of 10/3/23: 111.5 kg (245 lb 12.8 oz).    General: Pleasant, well nourished and hydrated female in NAD.   Psych:  Mood is \"good,\" affect is appropriate.  Thought form and content are fluid and coherent.    HEENT: Eyes and sclera are clear. Extraocular movements are grossly intact without proptosis.  Nares are patent, mucous membranes moist.  Neck: No masses or JVD are noted.    Resp: Easy and unlabored breathing.   Neuro: Alert and oriented, communicating clearly.   Ext: no swelling or edema.  Good distal pulses and capillary refill in digits.  Skin in good condition.  Sensation is intact to monofilament testing bilaterally and throughout.    Data:      Recent Labs   Lab Test 09/26/23  0747 09/23/23  1143 09/21/23  0908 09/20/23  0016 09/19/23  0750 08/17/23  0049 06/25/23  2332 06/20/23  1046 05/19/23  0913 05/10/23  1709 09/27/22  2216 08/17/22  1138 08/10/21  1349 07/27/21  1502   A1C  --   --  7.0*  --   --   --   --  6.7*  --   --    < >  --    < > 9.5*   TSH  --   --  2.86  --   --   --   --   --   --  1.60  --   --    < >  --    LDL  --   --  81  --  68  --   --   --   --   --   --   --    < >  --    HDL  --   --  68  --  56  --   --   --   --   --   --   --    < >  --    TRIG  --   --  136  --  155*  --   --   --   --   --   --   --    < >  --    CR 1.19* 1.28*  --  1.30*  --  1.13*   < > 1.29*   < > 1.44*   < >  --    < >  --    MICROL  --   --   --   --   --   --   --   --   --   --   --  11  --  14   AST  --   --   --  13  --  13   < >  --    < >  --    < >  --    < >  --    ALT  --   --   --  26  --  31   < >  --    < >  --    < >  --    < >  --     < > = values in this interval not displayed. "       Microalbuminuria:  Lab Results   Component Value Date    UMALCR 10.19 08/17/2022    UMALCR 9.40 07/27/2021       Most recent GFRs:    Lab Results   Component Value Date    GFRESTIMATED 51 (L) 09/26/2023    GFRESTIMATED 47 (L) 09/23/2023    GFRESTIMATED 46 (L) 09/20/2023    GFRESTBLACK >90 06/15/2021    GFRESTBLACK >90 06/15/2021    GFRESTBLACK >90 06/04/2021       Lab Results   Component Value Date    CPEPT 1.4 07/28/2009     FIB-4 Calculation: 0.95 at 9/23/2023 11:43 AM  Calculated from:  SGOT/AST: 13 U/L at 9/20/2023 12:16 AM  SGPT/ALT: 26 U/L at 9/20/2023 12:16 AM  Platelets: 166 10e3/uL at 9/23/2023 11:43 AM  Age: 63 years  AST   Date Value Ref Range Status   09/20/2023 13 0 - 45 U/L Final     Comment:     Reference intervals for this test were updated on 6/12/2023 to more accurately reflect our healthy population. There may be differences in the flagging of prior results with similar values performed with this method. Interpretation of those prior results can be made in the context of the updated reference intervals.   06/15/2021 12 0 - 45 U/L Final     Lab Results   Component Value Date    ALT 26 09/20/2023    ALT 23 06/15/2021         Most recent eye exam date: : Not Found       Assessment/Plan:    Nena Tang is a 63 year old female with poorly controlled type 2 diabetes.    Type 2 diabetes:  Sliding scale appears to be working rather well.  It does appear there is some room for increase in the basal insulin and will increase from 20 units twice daily to 22 units twice daily.  Blood sugars are high following meals which appear to be somewhat sporadic in both her timing and quantity.  I am recommending she transition from Trulicity 3 mg to Mounjaro initially 5 mg.  I am referring her to pharmacy for further titration of this.  She will continue her current mealtime and sliding scale dosing which is 8 units per meal +3 units per 50 mg/dL greater than 150.  She goes to bed too soon after her evening  meal to give any bedtime sliding scale.    I am asking nursing to phone the group home nurse at [phone] with this plan, and fax the AVS and medication orders to [phone].  Return to clinic in 3 months, Sooner with pharmacy for titration of Mounjaro.    Please increase Lantus to 22 units twice daily. Please advise if you see ANY BG <70 or frequent <90.      2. Please transition from Trulicity 3 mg weekly to Mounjaro 5 mg weekly once you have the Mounjaro.  Again, please advise if you see ANY BG <70 or frequent <90 with this change.    3. I also did place a referral for an eye exam as it appears you have not had this updated for a couple of years.  PLease advise them if Nena is receiving regular eye care elsewhere.        2. DM Complications-      Retinopathy:  Yes. Sent ophthalmology referral.   Nephropathy:  Yes.  Per primary acre  Neuropathy: No concerns today  Feet: Denies ulcers or lesions. Request RTC in person.    Lipids: 10-year atherosclerotic cardiovascular disease risk >20%.  Patient taking a statin.      3. Obesity:  Would like to lose weight.  Encouraging activity.  Will transition to Mounjaro.      61 minutes spent on the date of the encounter doing chart review, history and exam, documentation, education and counseling, as well as communication and coordination of care, and further activities as noted above.  This time includes time spent reviewing BG data.      It is my privilege to be involved in the care of the above patient.     Chanell Duque PA-C, MPAS  [facility]  Diabetes, Endocrinology, and Metabolism  [phone] Appointments/Nurse  [phone] pager  [phone] nurse line    This note was completed in part using Dragon voice recognition, and may contain word and grammatical errors.

## 2023-10-24 NOTE — TELEPHONE ENCOUNTER
Call back received from DAVIN Neves--she would like Chanell Duque PA-C's office to be aware that she just re-faxed pt's blood sugar readings (pt has appt scheduled later today-10/24/23 @ 1:30 PM). If fax is not received, please contact Edinson back at [phone].

## 2023-10-24 NOTE — PROGRESS NOTES
Diabetes Consult Note  October 24, 2023        Nena Tang  is a 63 year old female who has a past medical history of Acute respiratory failure with hypoxia (H), CAD (coronary artery disease), Chronic low back pain, Cocaine abuse, in remission (H), Fecal urgency, History of heroin abuse (H), Hyperlipidemia LDL goal <100, Hypertension, Illiterate, Irritable bowel syndrome, Left cataract, Migraine, Migraine headache, Moderate major depression (H), Noncompliance with medication regimen, Obesity, CINDY (obstructive sleep apnea), CINDY (obstructive sleep apnea)- mild AHI 10.3, Osteopenia, Pneumonia of right lower lobe due to infectious organism, Schizoaffective disorder, depressive type (H), Sepsis (H), Suicidal intent, Takotsubo cardiomyopathy, Type 2 diabetes mellitus (H), Uterine cancer (H), and Verbal auditory hallucination. She is here for follow-up of diabetes.            HPI:  Nena was recently hospitalized at [facility] for suicidal ideation September 20 through September 26.  She was followed by the inpatient diabetes service for her type 2 diabetes and was discharged on Lantus 20 units twice daily which was decreased following a steroid taper, NovoLog 1 unit per 7 g of carbohydrate, and NovoLog 1 unit per 25 mg/dL greater than 140 3 times daily and greater than 200 nightly.  She had previously been on Trulicity but this was held while she was hospitalized.      Interim:    Please see table below for symptoms since Nena was last seen.    Patient Supplied Answers To Diabetic Questionnaire         No data to display                 Today:  Nena is now back at her group home.  She thinks things are going well but her blood sugars are high.  She has resumed taking her Trulicity and has no concerns with nausea, abdominal pain or other concerns.  She has a pretty good appetite and   Nena has had diabetes for many years, since at least 2009.  Prior to her most recent hospitalization she was taking  Lantus 33 units twice daily, Humalog 8 units 3 times a day with meals and a sliding scale of 3 units per 50 mg/dL greater than 150 together with Trulicity 3 mg every week.  Her diabetes is known to be complicated by retinopathy chronic kidney disease and neuropathy.  She reports that she does know when her blood sugar is low and she will get shaky once her blood sugar gets around 90.  Nena reports that she is active on the days that she goes to day programming.  She goes to day program in 3 to 4 days/week and there she will do walking as well as some crafts and other activities.  Nena is unable to tell me who takes care of her diabetes.  She goes to her doctor's office, there is no visiting provider at her group home, but the nurse there helps her a lot.      I was able to speak with staff at Nena's home.  She generally will come home from day programming between 2 and 330 take a nap and then have her dinner between 6 and 8 PM.  Her blood sugars are higher the days she goes to day programming, per staff as they have a lot of snacks there.  Her Lantus dose prior to hospitalization was 33 units twice daily, but has now been reduced to 20 units twice daily.  She continues to take 8 units per meal plus a sliding scale of 3 units per 50 greater than 150.  She does not get any sliding scale in the evening because she eats so late and they do not have orders for this.  They do have orders for 8 unit snack if she advises them of a snack she eats in her room in the evening, no staff states she rarely does this.    Current Diabetes Medications:  Lantus 20 units twice daily  Trulicity 3 mg every Tuesday  Humalog 8 units per meal  Humalog 3 units per 50 greater than 150 3 times daily AC.        We reviewed glucometer/CGMS data together.  It revealed:  Fasting blood sugars: 95, 182 155 175 159 297 188 139 322 270 246 149 230 246  Midday blood sugars: 223 156 269 169 249 140  Evening blood sugars: 220 185 213 202 133 218  203 201 213    Blood sugars are on occasion at goal fasting as well as midday, they are generally above goal in the evening.      History of Diabetes monitoring and complications/ prevention:  CAD: yes  Last eye exam results: Last exam appears to be in 2020  Neuropathy: yes  Nephropathy:CKD 3  HTN: yes On metoprolol and Cozaar  On statin: Lipitor 80 mg daily  On ASA:yes  Depression:yes      Nena's PMH, PSH and allergies were reviewed today and pertinent information is summarized above.    Nena's pertinent social and family history are also reviewed today and pertinent information is summarized above.  Also noted is:Lives in group home.  Has lost many family members in recent years.      Current Outpatient Medications   Medication    acetaminophen (TYLENOL) 500 MG tablet    Alcohol Swabs (EASY TOUCH ALCOHOL PREP MEDIUM) 70 % PADS    alendronate (FOSAMAX) 70 MG tablet    amantadine (SYMMETREL) 100 MG capsule    aspirin (ASPIRIN LOW DOSE) 81 MG EC tablet    atorvastatin (LIPITOR) 80 MG tablet    blood glucose (NO BRAND SPECIFIED) test strip    calcium carbonate (OS-ALLEN) 1500 (600 Ca) MG tablet    clonazePAM (KLONOPIN) 0.5 MG tablet    Continuous Blood Gluc Sensor (DEXCOM G6 SENSOR) MISC    Continuous Blood Gluc Transmit (DEXCOM G6 TRANSMITTER) MISC    diclofenac (VOLTAREN) 1 % topical gel    escitalopram (LEXAPRO) 20 MG tablet    estradiol (ESTRACE VAGINAL) 0.1 MG/GM vaginal cream    gabapentin (NEURONTIN) 300 MG capsule    hydrOXYzine (VISTARIL) 25 MG capsule    insulin glargine (LANTUS PEN) 100 UNIT/ML pen    insulin lispro (HUMALOG KWIKPEN) 100 UNIT/ML (1 unit dial) KWIKPEN    Lidocaine (LIDOCARE) 4 % Patch    losartan (COZAAR) 100 MG tablet    magnesium 250 MG tablet    metoprolol succinate ER (TOPROL XL) 50 MG 24 hr tablet    mirabegron (MYRBETRIQ) 50 MG 24 hr tablet    NOVOFINE AUTOCOVER PEN NEEDLE 30G X 8 MM miscellaneous    nystatin (MYCOSTATIN) 486549 UNIT/GM external powder    ondansetron (ZOFRAN) 4 MG  "tablet    OneTouch Delica Lancets 33G MISC    order for DME    order for DME    paliperidone ER (INVEGA) 6 MG 24 hr tablet    pantoprazole (PROTONIX) 40 MG EC tablet    predniSONE (DELTASONE) 2.5 MG tablet    predniSONE (DELTASONE) 2.5 MG tablet    predniSONE (DELTASONE) 5 MG tablet    QUEtiapine (SEROQUEL) 25 MG tablet    senna-docusate (SENOKOT-S/PERICOLACE) 8.6-50 MG tablet    thin (NO BRAND SPECIFIED) lancets    traZODone (DESYREL) 100 MG tablet    TRULICITY 3 MG/0.5ML SOPN    vitamin C (ASCORBIC ACID) 500 MG tablet    zinc oxide (DESITIN) 20 % external ointment     Current Facility-Administered Medications   Medication    apraclonidine (IOPIDINE) 1 % ophthalmic solution 1 drop    lidocaine 1 % injection 4 mL    triamcinolone (KENALOG-40) injection 40 mg         ROS:   Reports good physical activity tolerance.  Denies any pedal lesions or vision changes or concerns. Denies any other acute concerns except as noted above.      Exam:    LMP 01/06/2015 (Exact Date)   Wt Readings from Last 10 Encounters:   10/03/23 111.5 kg (245 lb 12.8 oz)   09/26/23 111.8 kg (246 lb 8 oz)   09/18/23 110.2 kg (243 lb)   08/07/23 110.2 kg (243 lb)   07/25/23 110.2 kg (243 lb)   07/11/23 108.9 kg (240 lb)   06/25/23 105.2 kg (232 lb)   06/22/23 108.4 kg (239 lb)   06/20/23 108.4 kg (239 lb)   06/06/23 108.9 kg (240 lb)     Estimated body mass index is 47.01 kg/m  as calculated from the following:    Height as of 10/3/23: 1.54 m (5' 0.63\").    Weight as of 10/3/23: 111.5 kg (245 lb 12.8 oz).    General: Pleasant, well nourished and hydrated female in NAD.   Psych:  Mood is \"good,\" affect is appropriate.  Thought form and content are fluid and coherent.    HEENT: Eyes and sclera are clear. Extraocular movements are grossly intact without proptosis.  Nares are patent, mucous membranes moist.  Neck: No masses or JVD are noted.    Resp: Easy and unlabored breathing.   Neuro: Alert and oriented, communicating clearly.   Ext: no swelling or " edema.  Good distal pulses and capillary refill in digits.  Skin in good condition.  Sensation is intact to monofilament testing bilaterally and throughout.    Data:      Recent Labs   Lab Test 09/26/23  0747 09/23/23  1143 09/21/23  0908 09/20/23  0016 09/19/23  0750 08/17/23  0049 06/25/23  2332 06/20/23  1046 05/19/23  0913 05/10/23  1709 09/27/22  2216 08/17/22  1138 08/10/21  1349 07/27/21  1502   A1C  --   --  7.0*  --   --   --   --  6.7*  --   --    < >  --    < > 9.5*   TSH  --   --  2.86  --   --   --   --   --   --  1.60  --   --    < >  --    LDL  --   --  81  --  68  --   --   --   --   --   --   --    < >  --    HDL  --   --  68  --  56  --   --   --   --   --   --   --    < >  --    TRIG  --   --  136  --  155*  --   --   --   --   --   --   --    < >  --    CR 1.19* 1.28*  --  1.30*  --  1.13*   < > 1.29*   < > 1.44*   < >  --    < >  --    MICROL  --   --   --   --   --   --   --   --   --   --   --  11  --  14   AST  --   --   --  13  --  13   < >  --    < >  --    < >  --    < >  --    ALT  --   --   --  26  --  31   < >  --    < >  --    < >  --    < >  --     < > = values in this interval not displayed.       Microalbuminuria:  Lab Results   Component Value Date    UMALCR 10.19 08/17/2022    UMALCR 9.40 07/27/2021       Most recent GFRs:    Lab Results   Component Value Date    GFRESTIMATED 51 (L) 09/26/2023    GFRESTIMATED 47 (L) 09/23/2023    GFRESTIMATED 46 (L) 09/20/2023    GFRESTBLACK >90 06/15/2021    GFRESTBLACK >90 06/15/2021    GFRESTBLACK >90 06/04/2021       Lab Results   Component Value Date    CPEPT 1.4 07/28/2009     FIB-4 Calculation: 0.95 at 9/23/2023 11:43 AM  Calculated from:  SGOT/AST: 13 U/L at 9/20/2023 12:16 AM  SGPT/ALT: 26 U/L at 9/20/2023 12:16 AM  Platelets: 166 10e3/uL at 9/23/2023 11:43 AM  Age: 63 years  AST   Date Value Ref Range Status   09/20/2023 13 0 - 45 U/L Final     Comment:     Reference intervals for this test were updated on 6/12/2023 to more accurately  reflect our healthy population. There may be differences in the flagging of prior results with similar values performed with this method. Interpretation of those prior results can be made in the context of the updated reference intervals.   06/15/2021 12 0 - 45 U/L Final     Lab Results   Component Value Date    ALT 26 09/20/2023    ALT 23 06/15/2021         Most recent eye exam date: : Not Found       Assessment/Plan:    Nena Tang is a 63 year old female with poorly controlled type 2 diabetes.    Type 2 diabetes:  Sliding scale appears to be working rather well.  It does appear there is some room for increase in the basal insulin and will increase from 20 units twice daily to 22 units twice daily.  Blood sugars are high following meals which appear to be somewhat sporadic in both her timing and quantity.  I am recommending she transition from Trulicity 3 mg to Mounjaro initially 5 mg.  I am referring her to pharmacy for further titration of this.  She will continue her current mealtime and sliding scale dosing which is 8 units per meal +3 units per 50 mg/dL greater than 150.  She goes to bed too soon after her evening meal to give any bedtime sliding scale.    I am asking nursing to phone the group home nurse at [phone] with this plan, and fax the AVS and medication orders to [phone].  Return to clinic in 3 months, Sooner with pharmacy for titration of Mounjaro.    Please increase Lantus to 22 units twice daily. Please advise if you see ANY BG <70 or frequent <90.      2. Please transition from Trulicity 3 mg weekly to Mounjaro 5 mg weekly once you have the Mounjaro.  Again, please advise if you see ANY BG <70 or frequent <90 with this change.    3. I also did place a referral for an eye exam as it appears you have not had this updated for a couple of years.  PLease advise them if Nena is receiving regular eye care elsewhere.        2. DM Complications-      Retinopathy:  Yes. Sent ophthalmology  referral.   Nephropathy:  Yes.  Per primary acre  Neuropathy: No concerns today  Feet: Denies ulcers or lesions. Request RTC in person.    Lipids: 10-year atherosclerotic cardiovascular disease risk >20%.  Patient taking a statin.      3. Obesity:  Would like to lose weight.  Encouraging activity.  Will transition to Mounjaro.      61 minutes spent on the date of the encounter doing chart review, history and exam, documentation, education and counseling, as well as communication and coordination of care, and further activities as noted above.  This time includes time spent reviewing BG data.      It is my privilege to be involved in the care of the above patient.     Chanell Duque PA-C, MPAS  [facility]  Diabetes, Endocrinology, and Metabolism  [phone] Appointments/Nurse  [phone] pager  [phone] nurse line    This note was completed in part using Dragon voice recognition, and may contain word and grammatical errors.

## 2023-10-24 NOTE — PATIENT INSTRUCTIONS
Dear Nena and staff,    Please increase Lantus to 22 units twice daily. Please advise if you see ANY BG <70 or frequent <90.      Please transition from Trulicity 3 mg weekly to Mounjaro 5 mg weekly once you have the Mounjaro.  Again, please advise if you see ANY BG <70 or frequent <90 with this change.    I also did place a referral for an eye exam as it appears you have not had this updated for a couple of years.    Please reach out with any questions or concerns you have.    My best wishes,    Chanell Duque PA-C, [facility]  [facility]  Diabetes, Endocrinology, and Metabolism  [phone] Appointments/Nurse  [phone] Fax    Yes     - -  - -- -    Please reach out with any questions or concerns.    It is good to speak with you today!  I am here to support your health care and life goals by helping you to manage your diabetes and prevent complications.  Please let me know of anything more I might do to support this and review below to be aware of recommendations and resources that might apply to you and your situation.  With deep appreciation and my best wishes for [facility],  Chanell      Please contact us to schedule at any of our [place] lab locations  Call [phone]), select option [phone].    For [facility] lab at [address] [place] [address], call : [phone].    Please be sure to get your eye exam done annually as well as microalbuminuria test at lab if you do not see a nephrologist.      Our diabetic foot providers, Dr. Parra and Yin are available to see patients with pedal ulcers or other concerns.   Please be sure to let us know about any lesions that are not healing readily or other concerns with your feet!    If you are smoking, I do recommend that you quit or, if unable, reduce you use and help is available!  Our clinical pharmacists can work with you to develop a quit or reduction plan that may include nicotine replacement if desired and offer the support  and knowledge to help you quit smoking.  Call to schedule or let me know if you would like a referral!    Please work to meet recommendations for physical activity which is 30 minutes aerobic activity at 6 days each week and resistance or weight training 2-3 times /week.  If you are >60 years old, please be sure you are including exercises to help you maintain good balance and prevent falls as you continue to age.  Please advise if you you would like a referral to physical therapy to assist you in developing a personalized and appropriate program for yourself.     If you are on insulin and have ever had any low blood sugars that you are unable to treat your self we need a plan to prevent this, but you also MUST ALSO HAVE a prescription for GLUCAGON, nasal inhaled or injectable.  Please let me know right away, if you need this refilled.      Also many heart healthy food ideas are available at:  https://www.diabetesfoodhub.org/    If you do not already have a primary care provider it is very important that you do make an appointment to establish care with a family medicine, internal medicine, adult medicine, or med/peds primary care provider.  To establish primary care at [facility] with an Internal Medicine Physician, please call [phone] and ask for Internal Medicine/Primary Care clinic appointment.    Diabetes Education and Nutrition providers are also available to support you in matching your lifestyle and health goals as well as keep up with technology that can help your blood sugar management less burdensome and time consuming while getting better results!  Please let us know any time you would like to schedule with nutrition or diabetes educator; most insurers cover 2 - 6 hours of education annually and I see better outcomes in patients who take advantage of this.  Knowledge is power of course!    Blood pressure management is a VERY IMPORTANT part of diabetes management.  If you are having higher  blood pressures in clinic >140/90, PLEASE GET A HOME BLOOD PRESSURE cuff and check your blood sugar after sitting for 5 minutes.  If it is >140/90 seated resting at home it is imperative that you let me, your general practitioner or nephrologist know so that we can make a plan to address this.    My best wishes,    Chanell Duque PA-C, MPAS  [facility]  Diabetes, Endocrinology, and Metabolism  [phone] Appointments/Nurse  [phone] Fax

## 2023-10-25 NOTE — PROGRESS NOTES
Letter reviewed and signed.   ART Oliver CNP     What Is The Reason For Today's Visit?: Full Body Skin Examination What Is The Reason For Today's Visit? (Being Monitored For X): the development of new lesions How Severe Are Your Spot(S)?: moderate

## 2023-10-26 NOTE — TELEPHONE ENCOUNTER
Per Dr Medina: since his last RPR was >1y ago we can't tell if his syphilis is early or late latent. if no Headache or other neurological symptoms would need to presume late latent and treat as such - Penicillin G benzathine 2.4 million units IM once weekly for three weeks    Pt was made aware earlier that he would need treatment. Pt would like to start treatment tomorrow and come in for 1st Pen G. Pt was agreeable and indicated he would like to come for nurse visit tomorrow 10/27.     Advised to make recent partners of the last year aware that they will likely need treatment as well. LVM for pt indicating that he will need 3 injections 3 wks in a row and to call back if schedule does not permit.    Spoke with the patient and relayed the need for lab work.  Patient voiced understanding and agreed to plan of care.   Sakshi Viera RN May 23, 2019 11:03 AM

## 2023-11-02 ENCOUNTER — MYC MEDICAL ADVICE (OUTPATIENT)
Dept: PHARMACY | Facility: CLINIC | Age: 63
End: 2023-11-02
Payer: COMMERCIAL

## 2023-11-02 DIAGNOSIS — M54.2 CERVICALGIA: ICD-10-CM

## 2023-11-03 RX ORDER — LIDOCAINE 4 G/G
1 PATCH TOPICAL EVERY 24 HOURS
Qty: 30 PATCH | Refills: 0 | Status: SHIPPED | OUTPATIENT
Start: 2023-11-03 | End: 2024-01-08

## 2023-11-03 NOTE — TELEPHONE ENCOUNTER
Please okay new timing for this order. As pt requests at night.   I can fax this when okayed  Thanks,   Ulices Jasmine RN  [facility]

## 2023-11-06 NOTE — PROGRESS NOTES
Diabetes Consult Note  November 7, 2023        Nena Tang  is a 63 year old female who has a past medical history of Acute respiratory failure with hypoxia (H), CAD (coronary artery disease), Chronic low back pain, Cocaine abuse, in remission (H), Fecal urgency, History of heroin abuse (H), Hyperlipidemia LDL goal <100, Hypertension, Illiterate, Irritable bowel syndrome, Left cataract, Migraine, Migraine headache, Moderate major depression (H), Noncompliance with medication regimen, Obesity, CINDY (obstructive sleep apnea), CINDY (obstructive sleep apnea)- mild AHI 10.3, Osteopenia, Pneumonia of right lower lobe due to infectious organism, Schizoaffective disorder, depressive type (H), Sepsis (H), Suicidal intent, Takotsubo cardiomyopathy, Type 2 diabetes mellitus (H), Uterine cancer (H), and Verbal auditory hallucination. She is here for follow-up of diabetes.            HPI:  Nena was recently hospitalized at [facility] for suicidal ideation September 20 through September 26.  She was followed by the inpatient diabetes service for her type 2 diabetes and was discharged on Lantus 20 units twice daily which was decreased following a steroid taper, NovoLog 1 unit per 7 g of carbohydrate, and NovoLog 1 unit per 25 mg/dL greater than 140 3 times daily and greater than 200 nightly.  She had previously been on Trulicity but this was held while she was hospitalized.    I met Nena in clinic in late October of this year and advised to increase the Lantus from 20 to 22 units daily.  Transition from Trulicity 3 mg weekly to Mounjaro 5 mg weekly      Interim:  Group home reached out to report a blood sugar of 562 upon return from her day programming.      Today:  Nena is here today to follow-up very high blood sugars especially after returning from her day program.  She reports that the day program is really important for her and she would be really bored if she were going there.  She really enjoys the  people and activities and her day program.  She does indeed eat a lot there.  She reports that there is a nurse there that can give her insulin.  She reports that on the day she returned to her group home with a blood sugar of 562 she thinks she forgot to ask the nurse for insulin and she did eat a lot.   She is here with staff today and they report that sometimes she gets home late from her day programming and may go to sleep soon, but that quite frequently she is up 4-5 or even 6 hours after her evening meal sometimes needing more.  They are concerned she does not tell them that she is eating more and so she is not getting snack coverage.  There is staff available to give her a dose of insulin before she goes to sleep later in the evening.    Neither she nor the staff feel that she has been ill with blood sugars in the 500s she has not been vomiting or complaining of weakness, though she is at times tired.    She still had a good deal of Trulicity and has not yet switched to Mounjaro.      Current Diabetes Medications:  Lantus 20 units twice daily  Trulicity 3 mg every Tuesday  Humalog 8 units per meal  Humalog 3 units per 50 greater than 150 3 times daily AC.  Max dose 23 units.    She also has an order to take 4 units of Humalog with any snack    We reviewed glucometer/CGMS data together.  It revealed:  Fasting blood sugars: 230 130 157 194 203 217 235 175 153 129 246 297 159 155 230  Midday blood sugars: 291 151 125 163 245 242 227 185 551 230-191 215 122 562 242  Evening blood sugars: 150 122  217 184    Blood sugars are on occasion at goal fasting as well as midday, they are generally above goal in the evening.      History of Diabetes monitoring and complications/ prevention:  CAD: yes  Last eye exam results: She does have retinopathy and sees a retinal specialist.  She has an appointment pending.  Neuropathy: yes  Nephropathy:CKD 3  HTN: yes On metoprolol and Cozaar  On statin: Lipitor 80 mg  daily  On ASA:yes  Depression:yes      Nena's PMH, PSH and allergies were reviewed today and pertinent information is summarized above.    Nena's pertinent social and family history are also reviewed today and pertinent information is summarized above.  Also noted is:Lives in group home.  Has lost many family members in recent years.      Current Outpatient Medications   Medication    acetaminophen (TYLENOL) 500 MG tablet    Alcohol Swabs (EASY TOUCH ALCOHOL PREP MEDIUM) 70 % PADS    alendronate (FOSAMAX) 70 MG tablet    amantadine (SYMMETREL) 100 MG capsule    aspirin (ASPIRIN LOW DOSE) 81 MG EC tablet    atorvastatin (LIPITOR) 80 MG tablet    blood glucose (NO BRAND SPECIFIED) test strip    calcium carbonate (OS-ALLEN) 1500 (600 Ca) MG tablet    clonazePAM (KLONOPIN) 0.5 MG tablet    Continuous Blood Gluc Sensor (DEXCOM G6 SENSOR) MISC    Continuous Blood Gluc Transmit (DEXCOM G6 TRANSMITTER) MISC    diclofenac (VOLTAREN) 1 % topical gel    dulaglutide (TRULICITY) 0.75 MG/0.5ML pen    escitalopram (LEXAPRO) 20 MG tablet    estradiol (ESTRACE VAGINAL) 0.1 MG/GM vaginal cream    gabapentin (NEURONTIN) 300 MG capsule    hydrOXYzine (VISTARIL) 25 MG capsule    ibuprofen (ADVIL/MOTRIN) 200 MG capsule    insulin glargine (LANTUS PEN) 100 UNIT/ML pen    insulin lispro (HUMALOG KWIKPEN) 100 UNIT/ML (1 unit dial) KWIKPEN    Lidocaine (LIDOCARE) 4 % Patch    losartan (COZAAR) 100 MG tablet    magnesium 250 MG tablet    metoprolol succinate ER (TOPROL XL) 50 MG 24 hr tablet    mirabegron (MYRBETRIQ) 50 MG 24 hr tablet    NOVOFINE AUTOCOVER PEN NEEDLE 30G X 8 MM miscellaneous    nystatin (MYCOSTATIN) 383770 UNIT/GM external powder    ondansetron (ZOFRAN) 4 MG tablet    OneTouch Delica Lancets 33G MISC    order for DME    order for DME    paliperidone ER (INVEGA) 6 MG 24 hr tablet    pantoprazole (PROTONIX) 40 MG EC tablet    predniSONE (DELTASONE) 2.5 MG tablet    predniSONE (DELTASONE) 2.5 MG tablet    predniSONE  "(DELTASONE) 5 MG tablet    QUEtiapine (SEROQUEL) 25 MG tablet    senna-docusate (SENOKOT-S/PERICOLACE) 8.6-50 MG tablet    thin (NO BRAND SPECIFIED) lancets    tirzepatide (MOUNJARO) 5 MG/0.5ML pen    topiramate (TOPAMAX) 50 MG tablet    traZODone (DESYREL) 100 MG tablet    vitamin C (ASCORBIC ACID) 500 MG tablet    zinc oxide (DESITIN) 20 % external ointment     Current Facility-Administered Medications   Medication    apraclonidine (IOPIDINE) 1 % ophthalmic solution 1 drop    lidocaine 1 % injection 4 mL    triamcinolone (KENALOG-40) injection 40 mg         ROS:   Reports good physical activity tolerance.  Denies any pedal lesions or vision changes or concerns. Denies any other acute concerns except as noted above.      Exam:    BP (!) 140/81 (BP Location: Right arm, Patient Position: Sitting, Cuff Size: Adult Regular)   Pulse 77   Wt 113.4 kg (250 lb)   LMP 01/06/2015 (Exact Date)   SpO2 93%   BMI 47.82 kg/m    Wt Readings from Last 10 Encounters:   11/07/23 113.4 kg (250 lb)   10/03/23 111.5 kg (245 lb 12.8 oz)   09/26/23 111.8 kg (246 lb 8 oz)   09/18/23 110.2 kg (243 lb)   08/07/23 110.2 kg (243 lb)   07/25/23 110.2 kg (243 lb)   07/11/23 108.9 kg (240 lb)   06/25/23 105.2 kg (232 lb)   06/22/23 108.4 kg (239 lb)   06/20/23 108.4 kg (239 lb)     Estimated body mass index is 47.82 kg/m  as calculated from the following:    Height as of 10/3/23: 1.54 m (5' 0.63\").    Weight as of this encounter: 113.4 kg (250 lb).    General: Pleasant, well nourished and hydrated female in NAD.   Psych:  Mood is \"good,\" affect is appropriate.  Thought form and content are fluid and coherent.    HEENT: Eyes and sclera are clear. Extraocular movements are grossly intact without proptosis.  Nares are patent, mucous membranes moist.  Neck: No masses or JVD are noted.    Resp: Easy and unlabored breathing.   Neuro: Alert and oriented, communicating clearly.   Ext: no swelling or edema.  Good distal pulses and capillary refill in " digits.  Skin in good condition.  Sensation is intact to monofilament testing bilaterally and throughout.    Data:      Recent Labs   Lab Test 09/26/23  0747 09/23/23  1143 09/21/23  0908 09/20/23  0016 09/19/23  0750 08/17/23  0049 06/25/23  2332 06/20/23  1046 05/19/23  0913 05/10/23  1709 09/27/22  2216 08/17/22  1138 08/10/21  1349 07/27/21  1502   A1C  --   --  7.0*  --   --   --   --  6.7*  --   --    < >  --    < > 9.5*   TSH  --   --  2.86  --   --   --   --   --   --  1.60  --   --    < >  --    LDL  --   --  81  --  68  --   --   --   --   --   --   --    < >  --    HDL  --   --  68  --  56  --   --   --   --   --   --   --    < >  --    TRIG  --   --  136  --  155*  --   --   --   --   --   --   --    < >  --    CR 1.19* 1.28*  --  1.30*  --  1.13*   < > 1.29*   < > 1.44*   < >  --    < >  --    MICROL  --   --   --   --   --   --   --   --   --   --   --  11  --  14   AST  --   --   --  13  --  13   < >  --    < >  --    < >  --    < >  --    ALT  --   --   --  26  --  31   < >  --    < >  --    < >  --    < >  --     < > = values in this interval not displayed.       Microalbuminuria:  Lab Results   Component Value Date    UMALCR 10.19 08/17/2022    UMALCR 9.40 07/27/2021       Most recent GFRs:    Lab Results   Component Value Date    GFRESTIMATED 51 (L) 09/26/2023    GFRESTIMATED 47 (L) 09/23/2023    GFRESTIMATED 46 (L) 09/20/2023    GFRESTBLACK >90 06/15/2021    GFRESTBLACK >90 06/15/2021    GFRESTBLACK >90 06/04/2021       Lab Results   Component Value Date    CPEPT 1.4 07/28/2009     FIB-4 Calculation: 0.95 at 9/23/2023 11:43 AM  Calculated from:  SGOT/AST: 13 U/L at 9/20/2023 12:16 AM  SGPT/ALT: 26 U/L at 9/20/2023 12:16 AM  Platelets: 166 10e3/uL at 9/23/2023 11:43 AM  Age: 63 years  AST   Date Value Ref Range Status   09/20/2023 13 0 - 45 U/L Final     Comment:     Reference intervals for this test were updated on 6/12/2023 to more accurately reflect our healthy population. There may be  differences in the flagging of prior results with similar values performed with this method. Interpretation of those prior results can be made in the context of the updated reference intervals.   06/15/2021 12 0 - 45 U/L Final     Lab Results   Component Value Date    ALT 26 09/20/2023    ALT 23 06/15/2021         Most recent eye exam date: : Not Found       Assessment/Plan:    Nena Tang is a 63 year old female with poorly controlled type 2 diabetes.    Type 2 diabetes, uncontrolled but improved:  Her blood sugars have improved and since her last visit she has had just 2 very high blood sugars both in the 500s, but fastings are near to goal more often and blood sugars otherwise range from 1 22-2 91.  Sliding scale does appear to be effective for her.  She has not yet increased her Lantus to 22 units twice daily nor started Mounjaro.     I discussed that Nena that in order for it to be safe for her to continue to go to the her day programing is really important for her to make sure that the nurse is giving her insulin with her lunch there.  Staff state that they can indeed communicate to the day programming nurse to assure they are giving breakfast and lunch time NovoLog for Nena every day that she is there.  It is also important that staff continue to check her blood sugar upon arrival home and give sliding scale together with her evening meal.  It does not there appear that there is room to improve her blood sugar by adding sliding scale the nights that she is up later.  We discussed at length that this can only happen if it has been 4 to 5 hours since the dinnertime correction and is a separate scale.    Again I recorded this for staff to take back to the home and have asked also asked my nursing staff to communicate this.      Please increase Lantus to 22 units twice daily. Please advise if you see ANY BG <70 or frequent <90.      2. Please transition from Trulicity 3 mg weekly to Mounjaro 5 mg  weekly once you have the Mounjaro.  Again, please advise if you see ANY BG <70 or frequent <90 with this change.    3.  We will add bedtime sliding scale as follows:    IF awake 4-5 hours after dinner time Novolog injection, please check BG and if >200, give Humalog injection as follows to help BG overnight.  Do NOT give if <4 hours since last injection.    BEDTIME-If >4 hours since last Humalog injection    For  - 249 give 3 units.   For  - 299 give 6 units.   For  - 349 give 9 units.   For BG = or > 350 give 12 units.       2. DM Complications-      Retinopathy:  Yes.  Now has an ophthalmology appointment pending.  Nephropathy:  Yes.  Per primary care  Neuropathy: No concerns today  Feet: Denies ulcers or lesions. Request RTC in person.    Lipids: 10-year atherosclerotic cardiovascular disease risk >20%.  Patient taking a statin.      3. Obesity:  Would like to lose weight.  Encouraging activity.  Will transition to Mounjaro and then titrate dose.  I expect she will need reductions in her NovoLog dosing.  She has been referred to pharmacy for support with this until I am able to see her again in clinic.    40 minutes spent on the date of the encounter doing chart review, history and exam, documentation, education and counseling, as well as communication and coordination of care, and further activities as noted above.  This time includes time spent reviewing BG data.      It is my privilege to be involved in the care of the above patient.     Chanell Duque PA-C, MPAS  [facility]  Diabetes, Endocrinology, and Metabolism  [phone] Appointments/Nurse  [phone] pager  [phone] nurse line    This note was completed in part using Dragon voice recognition, and may contain word and grammatical errors.                    Patient is showing 5/5 MNCM met.   BIRDIE Quinonez  Outcome for 11/06/23 12:18 PM :Spoke to DAVIN Neves, who will fax BG data and MAR to clinic  today.  Janett Galdamez

## 2023-11-07 ENCOUNTER — OFFICE VISIT (OUTPATIENT)
Dept: ENDOCRINOLOGY | Facility: CLINIC | Age: 63
End: 2023-11-07
Payer: COMMERCIAL

## 2023-11-07 VITALS
DIASTOLIC BLOOD PRESSURE: 81 MMHG | SYSTOLIC BLOOD PRESSURE: 140 MMHG | BODY MASS INDEX: 47.82 KG/M2 | HEART RATE: 77 BPM | WEIGHT: 250 LBS | OXYGEN SATURATION: 93 %

## 2023-11-07 DIAGNOSIS — E11.22 TYPE 2 DIABETES MELLITUS WITH STAGE 3 CHRONIC KIDNEY DISEASE, WITH LONG-TERM CURRENT USE OF INSULIN, UNSPECIFIED WHETHER STAGE 3A OR 3B CKD (H): Primary | ICD-10-CM

## 2023-11-07 DIAGNOSIS — N18.30 TYPE 2 DIABETES MELLITUS WITH STAGE 3 CHRONIC KIDNEY DISEASE, WITH LONG-TERM CURRENT USE OF INSULIN, UNSPECIFIED WHETHER STAGE 3A OR 3B CKD (H): Primary | ICD-10-CM

## 2023-11-07 DIAGNOSIS — G47.00 PERSISTENT INSOMNIA: ICD-10-CM

## 2023-11-07 DIAGNOSIS — F32.A DEPRESSION WITH SUICIDAL IDEATION: ICD-10-CM

## 2023-11-07 DIAGNOSIS — Z79.4 TYPE 2 DIABETES MELLITUS WITH MILD NONPROLIFERATIVE RETINOPATHY WITHOUT MACULAR EDEMA, WITH LONG-TERM CURRENT USE OF INSULIN, UNSPECIFIED LATERALITY (H): ICD-10-CM

## 2023-11-07 DIAGNOSIS — F25.1 SCHIZOAFFECTIVE DISORDER, DEPRESSIVE TYPE (H): ICD-10-CM

## 2023-11-07 DIAGNOSIS — F43.10 PTSD (POST-TRAUMATIC STRESS DISORDER): ICD-10-CM

## 2023-11-07 DIAGNOSIS — K59.00 CONSTIPATION, UNSPECIFIED CONSTIPATION TYPE: ICD-10-CM

## 2023-11-07 DIAGNOSIS — E11.3299 TYPE 2 DIABETES MELLITUS WITH MILD NONPROLIFERATIVE RETINOPATHY WITHOUT MACULAR EDEMA, WITH LONG-TERM CURRENT USE OF INSULIN, UNSPECIFIED LATERALITY (H): ICD-10-CM

## 2023-11-07 DIAGNOSIS — R45.851 DEPRESSION WITH SUICIDAL IDEATION: ICD-10-CM

## 2023-11-07 DIAGNOSIS — Z79.4 TYPE 2 DIABETES MELLITUS WITH STAGE 3 CHRONIC KIDNEY DISEASE, WITH LONG-TERM CURRENT USE OF INSULIN, UNSPECIFIED WHETHER STAGE 3A OR 3B CKD (H): Primary | ICD-10-CM

## 2023-11-07 PROCEDURE — 99215 OFFICE O/P EST HI 40 MIN: CPT | Performed by: PHYSICIAN ASSISTANT

## 2023-11-07 RX ORDER — QUETIAPINE FUMARATE 25 MG/1
TABLET, FILM COATED ORAL
Qty: 150 TABLET | Refills: 4 | Status: SHIPPED | OUTPATIENT
Start: 2023-11-07 | End: 2024-04-09

## 2023-11-07 RX ORDER — INSULIN LISPRO 100 [IU]/ML
0-12 INJECTION, SOLUTION INTRAVENOUS; SUBCUTANEOUS AT BEDTIME
Qty: 15 ML | Refills: 1 | Status: SHIPPED | OUTPATIENT
Start: 2023-11-07 | End: 2024-05-24

## 2023-11-07 RX ORDER — DOCUSATE SODIUM 50 MG AND SENNOSIDES 8.6 MG 8.6; 5 MG/1; MG/1
TABLET, FILM COATED ORAL
Qty: 56 TABLET | OUTPATIENT
Start: 2023-11-07

## 2023-11-07 RX ORDER — OMEGA-3 FATTY ACIDS/FISH OIL 300-1000MG
200 CAPSULE ORAL PRN
COMMUNITY
End: 2023-12-11

## 2023-11-07 RX ORDER — TOPIRAMATE 50 MG/1
50 TABLET, FILM COATED ORAL 2 TIMES DAILY
COMMUNITY
End: 2023-11-08

## 2023-11-07 RX ORDER — TRAZODONE HYDROCHLORIDE 100 MG/1
100 TABLET ORAL AT BEDTIME
Qty: 28 TABLET | Refills: 11 | Status: SHIPPED | OUTPATIENT
Start: 2023-11-07

## 2023-11-07 ASSESSMENT — PAIN SCALES - GENERAL: PAINLEVEL: SEVERE PAIN (7)

## 2023-11-07 NOTE — NURSING NOTE
Chief Complaint   Patient presents with    Diabetes     Blood pressure (!) 140/81, pulse 77, weight 113.4 kg (250 lb), last menstrual period 01/06/2015, SpO2 93%, not currently breastfeeding.    Janett Galdamez

## 2023-11-07 NOTE — PATIENT INSTRUCTIONS
Dear Nena,    PLEASE BE SURE to talk with day programming staff so they can help you give insulin at breakfast and/or lunch time,      Please increase Lantus to 22 units twice daily    IF awake 4-5 hours after dinner time Novolog injection, please check BG and if >200, give Humalog injection as follows to help BG overnight.  Do NOT give if <4 hours since last injection.    BEDTIME-If >4 hours since last Humalog injection    For  - 249 give 3 units.   For  - 299 give 6 units.   For  - 349 give 9 units.   For BG = or > 350 give 12 units.     Our blood glucose goals are as following:  Fasting blood sugar before eating in the mornin - 140 most days   Blood sugar before lunch and dinner: 90 - 160  Blood sugar at bedtime: <180  No blood sugars <55 and rare blood sugars <70.  Please contact us if having any blood sugars <55 or frequent <70.     If BG >350 >4 hours.  Drink water and go to ER.    Switch from Trulicity 3 mg weekly to Mounjaro 5 mg weekly as soon as Mounjaro available.        My best wishes,    Chanell Duque PA-C, MPAS  [facility]  Diabetes, Endocrinology, and Metabolism  [phone] Appointments/Nurse  [phone] Fax

## 2023-11-07 NOTE — LETTER
11/7/2023       RE: Nena Tang  [address] [place] [address]     Dear Colleague,    Thank you for referring your patient, Nena Tang, to the [facility] ENDOCRINOLOGY [facility]INIC [place] [facility]. Please see a copy of my visit note below.                 Diabetes Consult Note  November 7, 2023        Nena Tang  is a 63 year old female who has a past medical history of Acute respiratory failure with hypoxia (H), CAD (coronary artery disease), Chronic low back pain, Cocaine abuse, in remission (H), Fecal urgency, History of heroin abuse (H), Hyperlipidemia LDL goal <100, Hypertension, Illiterate, Irritable bowel syndrome, Left cataract, Migraine, Migraine headache, Moderate major depression (H), Noncompliance with medication regimen, Obesity, CINDY (obstructive sleep apnea), CINDY (obstructive sleep apnea)- mild AHI 10.3, Osteopenia, Pneumonia of right lower lobe due to infectious organism, Schizoaffective disorder, depressive type (H), Sepsis (H), Suicidal intent, Takotsubo cardiomyopathy, Type 2 diabetes mellitus (H), Uterine cancer (H), and Verbal auditory hallucination. She is here for follow-up of diabetes.            HPI:  Nena was recently hospitalized at [facility] for suicidal ideation September 20 through September 26.  She was followed by the inpatient diabetes service for her type 2 diabetes and was discharged on Lantus 20 units twice daily which was decreased following a steroid taper, NovoLog 1 unit per 7 g of carbohydrate, and NovoLog 1 unit per 25 mg/dL greater than 140 3 times daily and greater than 200 nightly.  She had previously been on Trulicity but this was held while she was hospitalized.    I met Nena in clinic in late October of this year and advised to increase the Lantus from 20 to 22 units daily.  Transition from Trulicity 3 mg weekly to Mounjaro 5 mg weekly      Interim:  Group home reached out to  report a blood sugar of 562 upon return from her day programming.      Today:  Nena is here today to follow-up very high blood sugars especially after returning from her day program.  She reports that the day program is really important for her and she would be really bored if she were going there.  She really enjoys the people and activities and her day program.  She does indeed eat a lot there.  She reports that there is a nurse there that can give her insulin.  She reports that on the day she returned to her group home with a blood sugar of 562 she thinks she forgot to ask the nurse for insulin and she did eat a lot.   She is here with staff today and they report that sometimes she gets home late from her day programming and may go to sleep soon, but that quite frequently she is up 4-5 or even 6 hours after her evening meal sometimes needing more.  They are concerned she does not tell them that she is eating more and so she is not getting snack coverage.  There is staff available to give her a dose of insulin before she goes to sleep later in the evening.    Neither she nor the staff feel that she has been ill with blood sugars in the 500s she has not been vomiting or complaining of weakness, though she is at times tired.    She still had a good deal of Trulicity and has not yet switched to Mounjaro.      Current Diabetes Medications:  Lantus 20 units twice daily  Trulicity 3 mg every Tuesday  Humalog 8 units per meal  Humalog 3 units per 50 greater than 150 3 times daily AC.  Max dose 23 units.    She also has an order to take 4 units of Humalog with any snack    We reviewed glucometer/CGMS data together.  It revealed:  Fasting blood sugars: 230 130 157 194 203 217 235 175 153 129 246 297 159 155 230  Midday blood sugars: 291 151 125 163 245 242 227 185 551 230-191 215 122 562 242  Evening blood sugars: 150 122  217 184    Blood sugars are on occasion at goal fasting as well as midday, they are  generally above goal in the evening.      History of Diabetes monitoring and complications/ prevention:  CAD: yes  Last eye exam results: She does have retinopathy and sees a retinal specialist.  She has an appointment pending.  Neuropathy: yes  Nephropathy:CKD 3  HTN: yes On metoprolol and Cozaar  On statin: Lipitor 80 mg daily  On ASA:yes  Depression:yes      Nena's PMH, PSH and allergies were reviewed today and pertinent information is summarized above.    Nena's pertinent social and family history are also reviewed today and pertinent information is summarized above.  Also noted is:Lives in group home.  Has lost many family members in recent years.      Current Outpatient Medications   Medication    acetaminophen (TYLENOL) 500 MG tablet    Alcohol Swabs (EASY TOUCH ALCOHOL PREP MEDIUM) 70 % PADS    alendronate (FOSAMAX) 70 MG tablet    amantadine (SYMMETREL) 100 MG capsule    aspirin (ASPIRIN LOW DOSE) 81 MG EC tablet    atorvastatin (LIPITOR) 80 MG tablet    blood glucose (NO BRAND SPECIFIED) test strip    calcium carbonate (OS-ALLEN) 1500 (600 Ca) MG tablet    clonazePAM (KLONOPIN) 0.5 MG tablet    Continuous Blood Gluc Sensor (DEXCOM G6 SENSOR) MISC    Continuous Blood Gluc Transmit (DEXCOM G6 TRANSMITTER) MISC    diclofenac (VOLTAREN) 1 % topical gel    dulaglutide (TRULICITY) 0.75 MG/0.5ML pen    escitalopram (LEXAPRO) 20 MG tablet    estradiol (ESTRACE VAGINAL) 0.1 MG/GM vaginal cream    gabapentin (NEURONTIN) 300 MG capsule    hydrOXYzine (VISTARIL) 25 MG capsule    ibuprofen (ADVIL/MOTRIN) 200 MG capsule    insulin glargine (LANTUS PEN) 100 UNIT/ML pen    insulin lispro (HUMALOG KWIKPEN) 100 UNIT/ML (1 unit dial) KWIKPEN    Lidocaine (LIDOCARE) 4 % Patch    losartan (COZAAR) 100 MG tablet    magnesium 250 MG tablet    metoprolol succinate ER (TOPROL XL) 50 MG 24 hr tablet    mirabegron (MYRBETRIQ) 50 MG 24 hr tablet    NOVOFINE AUTOCOVER PEN NEEDLE 30G X 8 MM miscellaneous    nystatin (MYCOSTATIN)  "861941 UNIT/GM external powder    ondansetron (ZOFRAN) 4 MG tablet    OneTouch Delica Lancets 33G MISC    order for DME    order for DME    paliperidone ER (INVEGA) 6 MG 24 hr tablet    pantoprazole (PROTONIX) 40 MG EC tablet    predniSONE (DELTASONE) 2.5 MG tablet    predniSONE (DELTASONE) 2.5 MG tablet    predniSONE (DELTASONE) 5 MG tablet    QUEtiapine (SEROQUEL) 25 MG tablet    senna-docusate (SENOKOT-S/PERICOLACE) 8.6-50 MG tablet    thin (NO BRAND SPECIFIED) lancets    tirzepatide (MOUNJARO) 5 MG/0.5ML pen    topiramate (TOPAMAX) 50 MG tablet    traZODone (DESYREL) 100 MG tablet    vitamin C (ASCORBIC ACID) 500 MG tablet    zinc oxide (DESITIN) 20 % external ointment     Current Facility-Administered Medications   Medication    apraclonidine (IOPIDINE) 1 % ophthalmic solution 1 drop    lidocaine 1 % injection 4 mL    triamcinolone (KENALOG-40) injection 40 mg         ROS:   Reports good physical activity tolerance.  Denies any pedal lesions or vision changes or concerns. Denies any other acute concerns except as noted above.      Exam:    BP (!) 140/81 (BP Location: Right arm, Patient Position: Sitting, Cuff Size: Adult Regular)   Pulse 77   Wt 113.4 kg (250 lb)   LMP 01/06/2015 (Exact Date)   SpO2 93%   BMI 47.82 kg/m    Wt Readings from Last 10 Encounters:   11/07/23 113.4 kg (250 lb)   10/03/23 111.5 kg (245 lb 12.8 oz)   09/26/23 111.8 kg (246 lb 8 oz)   09/18/23 110.2 kg (243 lb)   08/07/23 110.2 kg (243 lb)   07/25/23 110.2 kg (243 lb)   07/11/23 108.9 kg (240 lb)   06/25/23 105.2 kg (232 lb)   06/22/23 108.4 kg (239 lb)   06/20/23 108.4 kg (239 lb)     Estimated body mass index is 47.82 kg/m  as calculated from the following:    Height as of 10/3/23: 1.54 m (5' 0.63\").    Weight as of this encounter: 113.4 kg (250 lb).    General: Pleasant, well nourished and hydrated female in NAD.   Psych:  Mood is \"good,\" affect is appropriate.  Thought form and content are fluid and coherent.    HEENT: Eyes and " sclera are clear. Extraocular movements are grossly intact without proptosis.  Nares are patent, mucous membranes moist.  Neck: No masses or JVD are noted.    Resp: Easy and unlabored breathing.   Neuro: Alert and oriented, communicating clearly.   Ext: no swelling or edema.  Good distal pulses and capillary refill in digits.  Skin in good condition.  Sensation is intact to monofilament testing bilaterally and throughout.    Data:      Recent Labs   Lab Test 09/26/23  0747 09/23/23  1143 09/21/23  0908 09/20/23  0016 09/19/23  0750 08/17/23  0049 06/25/23  2332 06/20/23  1046 05/19/23  0913 05/10/23  1709 09/27/22  2216 08/17/22  1138 08/10/21  1349 07/27/21  1502   A1C  --   --  7.0*  --   --   --   --  6.7*  --   --    < >  --    < > 9.5*   TSH  --   --  2.86  --   --   --   --   --   --  1.60  --   --    < >  --    LDL  --   --  81  --  68  --   --   --   --   --   --   --    < >  --    HDL  --   --  68  --  56  --   --   --   --   --   --   --    < >  --    TRIG  --   --  136  --  155*  --   --   --   --   --   --   --    < >  --    CR 1.19* 1.28*  --  1.30*  --  1.13*   < > 1.29*   < > 1.44*   < >  --    < >  --    MICROL  --   --   --   --   --   --   --   --   --   --   --  11  --  14   AST  --   --   --  13  --  13   < >  --    < >  --    < >  --    < >  --    ALT  --   --   --  26  --  31   < >  --    < >  --    < >  --    < >  --     < > = values in this interval not displayed.       Microalbuminuria:  Lab Results   Component Value Date    UMALCR 10.19 08/17/2022    UMALCR 9.40 07/27/2021       Most recent GFRs:    Lab Results   Component Value Date    GFRESTIMATED 51 (L) 09/26/2023    GFRESTIMATED 47 (L) 09/23/2023    GFRESTIMATED 46 (L) 09/20/2023    GFRESTBLACK >90 06/15/2021    GFRESTBLACK >90 06/15/2021    GFRESTBLACK >90 06/04/2021       Lab Results   Component Value Date    CPEPT 1.4 07/28/2009     FIB-4 Calculation: 0.95 at 9/23/2023 11:43 AM  Calculated from:  SGOT/AST: 13 U/L at 9/20/2023 12:16  AM  SGPT/ALT: 26 U/L at 9/20/2023 12:16 AM  Platelets: 166 10e3/uL at 9/23/2023 11:43 AM  Age: 63 years  AST   Date Value Ref Range Status   09/20/2023 13 0 - 45 U/L Final     Comment:     Reference intervals for this test were updated on 6/12/2023 to more accurately reflect our healthy population. There may be differences in the flagging of prior results with similar values performed with this method. Interpretation of those prior results can be made in the context of the updated reference intervals.   06/15/2021 12 0 - 45 U/L Final     Lab Results   Component Value Date    ALT 26 09/20/2023    ALT 23 06/15/2021         Most recent eye exam date: : Not Found       Assessment/Plan:    Nena Tang is a 63 year old female with poorly controlled type 2 diabetes.    Type 2 diabetes, uncontrolled but improved:  Her blood sugars have improved and since her last visit she has had just 2 very high blood sugars both in the 500s, but fastings are near to goal more often and blood sugars otherwise range from 1 22-2 91.  Sliding scale does appear to be effective for her.  She has not yet increased her Lantus to 22 units twice daily nor started Mounjaro.     I discussed that Nena that in order for it to be safe for her to continue to go to the her day programing is really important for her to make sure that the nurse is giving her insulin with her lunch there.  Staff state that they can indeed communicate to the day programming nurse to assure they are giving breakfast and lunch time NovoLog for Nena every day that she is there.  It is also important that staff continue to check her blood sugar upon arrival home and give sliding scale together with her evening meal.  It does not there appear that there is room to improve her blood sugar by adding sliding scale the nights that she is up later.  We discussed at length that this can only happen if it has been 4 to 5 hours since the dinnertime correction and is a  separate scale.    Again I recorded this for staff to take back to the home and have asked also asked my nursing staff to communicate this.      Please increase Lantus to 22 units twice daily. Please advise if you see ANY BG <70 or frequent <90.      2. Please transition from Trulicity 3 mg weekly to Mounjaro 5 mg weekly once you have the Mounjaro.  Again, please advise if you see ANY BG <70 or frequent <90 with this change.    3.  We will add bedtime sliding scale as follows:    IF awake 4-5 hours after dinner time Novolog injection, please check BG and if >200, give Humalog injection as follows to help BG overnight.  Do NOT give if <4 hours since last injection.    BEDTIME-If >4 hours since last Humalog injection    For  - 249 give 3 units.   For  - 299 give 6 units.   For  - 349 give 9 units.   For BG = or > 350 give 12 units.       2. DM Complications-      Retinopathy:  Yes.  Now has an ophthalmology appointment pending.  Nephropathy:  Yes.  Per primary care  Neuropathy: No concerns today  Feet: Denies ulcers or lesions. Request RTC in person.    Lipids: 10-year atherosclerotic cardiovascular disease risk >20%.  Patient taking a statin.      3. Obesity:  Would like to lose weight.  Encouraging activity.  Will transition to Mounjaro and then titrate dose.  I expect she will need reductions in her NovoLog dosing.  She has been referred to pharmacy for support with this until I am able to see her again in clinic.    40 minutes spent on the date of the encounter doing chart review, history and exam, documentation, education and counseling, as well as communication and coordination of care, and further activities as noted above.  This time includes time spent reviewing BG data.      It is my privilege to be involved in the care of the above patient.     Chanell Duque PA-C, MPAS  [facility]  Diabetes, Endocrinology, and Metabolism  [phone] Appointments/Nurse  [phone]  pager  [phone] nurse line    This note was completed in part using Dragon voice recognition, and may contain word and grammatical errors.                    Patient is showing 5/5 MNCM met.   Janett Galdamez, VF  Outcome for 11/06/23 12:18 PM :Spoke to DAVIN Neves, who will fax BG data and MAR to clinic today.  Janett Galdamez

## 2023-11-08 ENCOUNTER — OFFICE VISIT (OUTPATIENT)
Dept: PHARMACY | Facility: CLINIC | Age: 63
End: 2023-11-08
Attending: PHYSICIAN ASSISTANT
Payer: COMMERCIAL

## 2023-11-08 VITALS — SYSTOLIC BLOOD PRESSURE: 140 MMHG | DIASTOLIC BLOOD PRESSURE: 78 MMHG

## 2023-11-08 DIAGNOSIS — Z79.4 TYPE 2 DIABETES MELLITUS WITH MILD NONPROLIFERATIVE RETINOPATHY WITHOUT MACULAR EDEMA, WITH LONG-TERM CURRENT USE OF INSULIN, UNSPECIFIED LATERALITY (H): ICD-10-CM

## 2023-11-08 DIAGNOSIS — F25.1 SCHIZOAFFECTIVE DISORDER, DEPRESSIVE TYPE (H): Primary | ICD-10-CM

## 2023-11-08 DIAGNOSIS — I10 HTN, GOAL BELOW 140/90: ICD-10-CM

## 2023-11-08 DIAGNOSIS — E11.3299 TYPE 2 DIABETES MELLITUS WITH MILD NONPROLIFERATIVE RETINOPATHY WITHOUT MACULAR EDEMA, WITH LONG-TERM CURRENT USE OF INSULIN, UNSPECIFIED LATERALITY (H): ICD-10-CM

## 2023-11-08 PROCEDURE — 99606 MTMS BY PHARM EST 15 MIN: CPT | Performed by: PHARMACIST

## 2023-11-08 PROCEDURE — 99607 MTMS BY PHARM ADDL 15 MIN: CPT | Performed by: PHARMACIST

## 2023-11-08 RX ORDER — UBIQUINOL 100 MG
CAPSULE ORAL
Qty: 200 EACH | Refills: 3 | Status: SHIPPED | OUTPATIENT
Start: 2023-11-08 | End: 2024-04-24

## 2023-11-08 NOTE — PROGRESS NOTES
Medication Therapy Management (MTM) Encounter    ASSESSMENT:                            Medication Adherence/Access: No issues identified    Schizoaffective:   Encouraged RN to please outreach to psychiatry to schedule a follow-up appointment and address nighttime anxiety.  Education on square breathing technique.     Diabetes:  Reviewed instructions for switching Trulicity to Mounjaro with patient and RN.  Since she has several pens remaining of Trulicity, OK to use these up before switching to Mounjaro.  Will plan to assist patient with Mounjaro titration monthly as tolerated.    Hypertension:  Variable blood pressure reading with home report. Before further adjusting metoprolol dose or adding new antihypertensive, will have  group home staff repeat blood pressure reading if first is elevated.      PLAN:                            Patient/RN to please schedule follow-up visit with psychiatry for anxiety and follow-up    If blood pressure is >140/90, wait 5 minutes and recheck    Finish up remaining Trulicity. Then following week, start Mounjaro 5mg every 7 days.     Follow-up: 1 month    SUBJECTIVE/OBJECTIVE:                          Nena Tang is a 63 year old female coming in for a follow-up visit from 10/3/23. Patient was accompanied by group home staff.   Edinson RN also on the phone during a portion of the visit.    Reason for visit: medication recheck.  Asking for updated orders to discontinue medications that have been on hold for months (scheduled acetaminophen, topiramate, prazosin) - addressed topiramate and apap, defer to psychiatry for prazosin.    Allergies/ADRs: Reviewed in chart  Past Medical History: Reviewed in chart  Tobacco: She reports that she has never smoked. She has never used smokeless tobacco.  Alcohol: not currently using    Medication Adherence/Access: no issues reported  Medications administered by group home staff.         Schizoaffective:  Quetiapine 125mg at bedtime   Paliperidone  ER 12mg at bedtime  Escitalopram 20mg daily  Hydroxyzine 25mg in the evening - takes earlier which is helping reduce anxiety  Trazodone 100mg at bedtime  Clonazepam 0.5mg at bedtime  Amantadine 100mg daily  Cyproheptadine - stopped during hospitalization, ineffective.  Reports having a hard time breathing some nights. O2 has been >95 and no breathing distress noted by staff but symptoms improve with coaching on calming down.  Asking if they have additional medication they can offer to her for anxiety at bedtime.  Followed by outside psychiatrist, due for follow-up and no appointment scheduled.     Diabetes   Type 2 Diabetes:    Lantus 22 units twice daily  Humalog 8 units with meals +sliding scale 3u per 50mg/dL starting glucose 151 and max 23u  Trulicity 3 mg weekly - will be changing to Mounjaro 5mg per endocrine (see notes from yesterday)  Side effects: constipation, manages with senna-docusate.    Blood sugar monitoring: Continuous Glucose Monitor, see visit with endocrine for detailed blood sugar log  Reports no hypoglycemia. She did have severe hyperglycemia after eating 2 pieces of cake and drinking 2 cups of coffee with sugar.  Current diabetes symptoms: none  Diet/Exercise: did not review today     Eye exam is up to date  Foot exam: due  Urine Albumin:   Lab Results   Component Value Date    UMALCR 10.19 08/17/2022      Lab Results   Component Value Date    A1C 7.0 (H) 09/21/2023     Hypertension:   Metoprolol XL 50mg AM and 100mg at bedtime  Losartan 100mg daily  Patient reports no current medication side effects.  Patient has blood pressure monitored by group home staff. Home BP monitoring 171/88 this morning.  120/71,135/76, 113/67, 130/75, 142/73, 158/78, 156/87, 183/95 -> 180/90, 168/89  Not typically rechecking elevated readings.  BP Readings from Last 3 Encounters:   11/07/23 (!) 140/81   10/03/23 132/60   09/27/23 (!) 179/100     Pulse Readings from Last 3 Encounters:   11/07/23 77   10/03/23 65    09/27/23 76      Today's Vitals: BP (!) 140/78   LMP 01/06/2015 (Exact Date)   ----------------      I spent 40 minutes with this patient today. All changes were made via collaborative practice agreement with Albino Rainey MD. A copy of the visit note was provided to the patient's provider(s).    A summary of these recommendations was given to the patient.    Eugenia De Jesus, PharmD, BCACP   Medication Therapy Management Pharmacist   [facility]  [phone]          Medication Therapy Recommendations  No medication therapy recommendations to display

## 2023-11-08 NOTE — PATIENT INSTRUCTIONS
"Recommendations from today's MTM visit:                                                         Patient/RN to please schedule follow-up visit with psychiatry for anxiety and follow-up    If blood pressure is >140/90, wait 5 minutes and recheck    Finish up remaining Trulicity. Then following week, start Mounjaro 5mg every 7 days.     Follow-up: 1 month    It was great speaking with you today.  I value your experience and would be very thankful for your time in providing feedback in our clinic survey. In the next few days, you may receive an email or text message from [employer] with a link to a survey related to your  clinical pharmacist.\"     To schedule another MTM appointment, please call the clinic directly or you may call the MTM scheduling line at [phone] or toll-free at [phone].     My Clinical Pharmacist's contact information:                                                      Please feel free to contact me with any questions or concerns you have.      Eugenia De Jesus, PharmD, BCACP   Medication Therapy Management Pharmacist   [facility]  [phone]    "

## 2023-11-13 ENCOUNTER — MYC REFILL (OUTPATIENT)
Dept: PHARMACY | Facility: CLINIC | Age: 63
End: 2023-11-13
Payer: COMMERCIAL

## 2023-11-13 DIAGNOSIS — E11.3299 TYPE 2 DIABETES MELLITUS WITH MILD NONPROLIFERATIVE RETINOPATHY WITHOUT MACULAR EDEMA, WITH LONG-TERM CURRENT USE OF INSULIN, UNSPECIFIED LATERALITY (H): ICD-10-CM

## 2023-11-13 DIAGNOSIS — Z79.4 TYPE 2 DIABETES MELLITUS WITH MILD NONPROLIFERATIVE RETINOPATHY WITHOUT MACULAR EDEMA, WITH LONG-TERM CURRENT USE OF INSULIN, UNSPECIFIED LATERALITY (H): ICD-10-CM

## 2023-11-13 DIAGNOSIS — K59.00 CONSTIPATION, UNSPECIFIED CONSTIPATION TYPE: ICD-10-CM

## 2023-11-13 RX ORDER — DOCUSATE SODIUM 50 MG AND SENNOSIDES 8.6 MG 8.6; 5 MG/1; MG/1
TABLET, FILM COATED ORAL
Qty: 56 TABLET | Refills: 1 | Status: SHIPPED | OUTPATIENT
Start: 2023-11-13 | End: 2024-01-06

## 2023-11-13 RX ORDER — INSULIN ADMIN. SUPPLIES
INSULIN PEN (EA) SUBCUTANEOUS
Qty: 200 EACH | Refills: 0 | Status: SHIPPED | OUTPATIENT
Start: 2023-11-13 | End: 2024-07-15

## 2023-11-14 RX ORDER — PROCHLORPERAZINE 25 MG/1
SUPPOSITORY RECTAL
Qty: 1 EACH | Refills: 3 | OUTPATIENT
Start: 2023-11-14

## 2023-11-14 RX ORDER — PROCHLORPERAZINE 25 MG/1
SUPPOSITORY RECTAL
Qty: 9 EACH | Refills: 2 | Status: SHIPPED | OUTPATIENT
Start: 2023-11-14 | End: 2024-08-02

## 2023-11-17 DIAGNOSIS — Z91.51 HISTORY OF SUICIDAL BEHAVIOR: ICD-10-CM

## 2023-11-17 DIAGNOSIS — F25.9 SCHIZOAFFECTIVE DISORDER, UNSPECIFIED TYPE (H): ICD-10-CM

## 2023-11-17 DIAGNOSIS — G24.01 NEUROLEPTIC-INDUCED TARDIVE DYSKINESIA: ICD-10-CM

## 2023-11-17 DIAGNOSIS — T43.505A NEUROLEPTIC-INDUCED TARDIVE DYSKINESIA: ICD-10-CM

## 2023-11-17 DIAGNOSIS — F51.04 PSYCHOPHYSIOLOGICAL INSOMNIA: ICD-10-CM

## 2023-11-17 RX ORDER — HYDROXYZINE PAMOATE 25 MG/1
CAPSULE ORAL
Qty: 28 CAPSULE | Refills: 0 | Status: SHIPPED | OUTPATIENT
Start: 2023-11-17

## 2023-11-17 RX ORDER — ESCITALOPRAM OXALATE 20 MG/1
20 TABLET ORAL DAILY
Qty: 30 TABLET | Refills: 11 | Status: SHIPPED | OUTPATIENT
Start: 2023-11-17 | End: 2024-01-16

## 2023-11-17 RX ORDER — AMANTADINE HYDROCHLORIDE 100 MG/1
100 CAPSULE, GELATIN COATED ORAL DAILY
Qty: 30 CAPSULE | Refills: 11 | Status: SHIPPED | OUTPATIENT
Start: 2023-11-17

## 2023-11-21 ENCOUNTER — TELEPHONE (OUTPATIENT)
Dept: RHEUMATOLOGY | Facility: CLINIC | Age: 63
End: 2023-11-21
Payer: COMMERCIAL

## 2023-11-21 DIAGNOSIS — Z79.4 TYPE 2 DIABETES MELLITUS WITH HYPERGLYCEMIA, WITH LONG-TERM CURRENT USE OF INSULIN (H): ICD-10-CM

## 2023-11-21 DIAGNOSIS — E11.65 TYPE 2 DIABETES MELLITUS WITH HYPERGLYCEMIA, WITH LONG-TERM CURRENT USE OF INSULIN (H): ICD-10-CM

## 2023-11-21 NOTE — TELEPHONE ENCOUNTER
[facility] Call Center    Phone Message    May a detailed message be left on voicemail: yes     Reason for Call: Other: Nohemi everett supervisor from [facility] called to cancel appointment on 11/22 with Dr Posey in [place]. Next available is not until March. This appointment was for a hospital follow up. Is there another day pt can see a provider at [place] soon??     Please call Nohemi back at [phone] for scheduling.     Action Taken: Message routed to:  Other: CS Rheum    Travel Screening: Not Applicable

## 2023-11-29 ENCOUNTER — TELEPHONE (OUTPATIENT)
Dept: ENDOCRINOLOGY | Facility: CLINIC | Age: 63
End: 2023-11-29
Payer: COMMERCIAL

## 2023-11-29 NOTE — TELEPHONE ENCOUNTER
[facility] PA REQUEST    Please route outcome to MTM/[facility]: Eugenia De Jesus, PharmD, BCACP     Prior Authorization Retail Medication Request    Medication/Dose: Mounjaro 5mg  ICD code (if different than what is on RX):    Previously Tried and Failed:  Trulicity, Ozempic, Byetta, Lantus, Humalog, metformin, glipizide, glimepiride  Rationale:  additional weight loss and glucose lowering with Mounjaro    Insurance Name:    Insurance ID:        Pharmacy Information (if different than what is on RX)  Name:    Phone:

## 2023-12-01 ENCOUNTER — TELEPHONE (OUTPATIENT)
Dept: FAMILY MEDICINE | Facility: CLINIC | Age: 63
End: 2023-12-01
Payer: COMMERCIAL

## 2023-12-01 NOTE — TELEPHONE ENCOUNTER
Prior Authorization Retail Medication Request    Medication/Dose: Trulicity 3 MG  Diagnosis and ICD code (if different than what is on RX):  E11.3299   New/renewal/insurance change PA/secondary ins. PA:  Previously Tried and Failed:  Ozempic, Byetta, Lantus, Humalog, metformin, glipizide, glimepiride   Rationale:  continuation of current therapy    Insurance   Primary: [employer]   Insurance ID:  [id number]    Pharmacy Information (if different than what is on RX)  Name:  [facility]  Phone:  [phone]   Fax:[phone]

## 2023-12-04 NOTE — TELEPHONE ENCOUNTER
Central Prior Authorization Team   Phone: [phone]    PA Initiation    Medication: Mounjaro  Insurance Company: [employer] Non-Specialty PA's - Phone [phone] Fax [phone]  [id number] [place] [address]  Filling Pharmacy Phone: [phone]  Filling Pharmacy Fax: [phone]  Start Date: 12/4/2023

## 2023-12-05 DIAGNOSIS — M54.50 CHRONIC RIGHT-SIDED LOW BACK PAIN WITHOUT SCIATICA: ICD-10-CM

## 2023-12-05 DIAGNOSIS — F25.1 SCHIZOAFFECTIVE DISORDER, DEPRESSIVE TYPE (H): ICD-10-CM

## 2023-12-05 DIAGNOSIS — N18.30 TYPE 2 DIABETES MELLITUS WITH STAGE 3 CHRONIC KIDNEY DISEASE, WITH LONG-TERM CURRENT USE OF INSULIN, UNSPECIFIED WHETHER STAGE 3A OR 3B CKD (H): Primary | ICD-10-CM

## 2023-12-05 DIAGNOSIS — Z79.4 TYPE 2 DIABETES MELLITUS WITH STAGE 3 CHRONIC KIDNEY DISEASE, WITH LONG-TERM CURRENT USE OF INSULIN, UNSPECIFIED WHETHER STAGE 3A OR 3B CKD (H): Primary | ICD-10-CM

## 2023-12-05 DIAGNOSIS — E11.22 TYPE 2 DIABETES MELLITUS WITH STAGE 3 CHRONIC KIDNEY DISEASE, WITH LONG-TERM CURRENT USE OF INSULIN, UNSPECIFIED WHETHER STAGE 3A OR 3B CKD (H): Primary | ICD-10-CM

## 2023-12-05 DIAGNOSIS — G89.29 CHRONIC RIGHT-SIDED LOW BACK PAIN WITHOUT SCIATICA: ICD-10-CM

## 2023-12-05 RX ORDER — DULAGLUTIDE 3 MG/.5ML
INJECTION, SOLUTION SUBCUTANEOUS
Qty: 2 ML | Refills: 3 | OUTPATIENT
Start: 2023-12-05

## 2023-12-05 RX ORDER — GABAPENTIN 300 MG/1
300 CAPSULE ORAL AT BEDTIME
Qty: 28 CAPSULE | Refills: 11 | Status: SHIPPED | OUTPATIENT
Start: 2023-12-05

## 2023-12-05 RX ORDER — DULAGLUTIDE 3 MG/.5ML
3 INJECTION, SOLUTION SUBCUTANEOUS WEEKLY
Qty: 7 ML | Refills: 1 | Status: SHIPPED | OUTPATIENT
Start: 2023-12-05 | End: 2023-12-11

## 2023-12-06 NOTE — TELEPHONE ENCOUNTER
Prior Authorization Approval    Authorization Effective Date: 11/6/2023  Authorization Expiration Date: 12/5/2024  Medication: Trulicity 3 MG-APPROVED  Reference #:     Insurance Company: [employer] ([employer]) - Phone [phone] Fax [phone]  Which Pharmacy is filling the prescription (Not needed for infusion/clinic administered): [id number] [place] [address]  Pharmacy Notified: Yes  Patient Notified: Instructed pharmacy to notify patient when script is ready to /ship.

## 2023-12-06 NOTE — TELEPHONE ENCOUNTER
Central Prior Authorization Team   Phone: [phone]    PA Initiation    Medication: TRULICITY 3 MG/0.5ML SC SOPN  Insurance Company: [employer] ([employer]) - Phone [phone] Fax [phone]  [id number] [place] [address]  Filling Pharmacy Phone: [phone]  Filling Pharmacy Fax:    Start Date: 12/6/2023

## 2023-12-07 NOTE — TELEPHONE ENCOUNTER
PA not needed.  Insurance states PA approval on file with expiration date of 11/27/2024.  Pharmacy was able to process through insurance today and will notify patient when ready.

## 2023-12-11 ENCOUNTER — OFFICE VISIT (OUTPATIENT)
Dept: RHEUMATOLOGY | Facility: CLINIC | Age: 63
End: 2023-12-11
Attending: OPHTHALMOLOGY
Payer: COMMERCIAL

## 2023-12-11 ENCOUNTER — OFFICE VISIT (OUTPATIENT)
Dept: OPHTHALMOLOGY | Facility: CLINIC | Age: 63
End: 2023-12-11
Attending: OPHTHALMOLOGY
Payer: COMMERCIAL

## 2023-12-11 ENCOUNTER — HOSPITAL ENCOUNTER (OUTPATIENT)
Dept: GENERAL RADIOLOGY | Facility: CLINIC | Age: 63
Discharge: HOME | End: 2023-12-11
Attending: INTERNAL MEDICINE
Payer: COMMERCIAL

## 2023-12-11 ENCOUNTER — PATIENT OUTREACH (OUTPATIENT)
Dept: ONCOLOGY | Facility: CLINIC | Age: 63
End: 2023-12-11

## 2023-12-11 ENCOUNTER — LAB (OUTPATIENT)
Dept: LAB | Facility: CLINIC | Age: 63
End: 2023-12-11
Attending: OPHTHALMOLOGY
Payer: COMMERCIAL

## 2023-12-11 ENCOUNTER — APPOINTMENT (OUTPATIENT)
Dept: LAB | Facility: CLINIC | Age: 63
End: 2023-12-11
Attending: OPHTHALMOLOGY
Payer: COMMERCIAL

## 2023-12-11 VITALS
TEMPERATURE: 97.9 F | DIASTOLIC BLOOD PRESSURE: 81 MMHG | WEIGHT: 247 LBS | SYSTOLIC BLOOD PRESSURE: 148 MMHG | OXYGEN SATURATION: 93 % | BODY MASS INDEX: 47.24 KG/M2 | HEART RATE: 66 BPM

## 2023-12-11 DIAGNOSIS — R76.8 POSITIVE ANA (ANTINUCLEAR ANTIBODY): ICD-10-CM

## 2023-12-11 DIAGNOSIS — R76.8 RHEUMATOID FACTOR POSITIVE: ICD-10-CM

## 2023-12-11 DIAGNOSIS — M25.50 POLYARTHRALGIA: ICD-10-CM

## 2023-12-11 DIAGNOSIS — E08.3292: ICD-10-CM

## 2023-12-11 DIAGNOSIS — M81.0 OSTEOPOROSIS WITHOUT CURRENT PATHOLOGICAL FRACTURE, UNSPECIFIED OSTEOPOROSIS TYPE: ICD-10-CM

## 2023-12-11 DIAGNOSIS — Z79.4 TYPE 2 DIABETES MELLITUS WITH STAGE 3 CHRONIC KIDNEY DISEASE, WITH LONG-TERM CURRENT USE OF INSULIN, UNSPECIFIED WHETHER STAGE 3A OR 3B CKD (H): ICD-10-CM

## 2023-12-11 DIAGNOSIS — N18.31 STAGE 3A CHRONIC KIDNEY DISEASE (H): ICD-10-CM

## 2023-12-11 DIAGNOSIS — M15.9 GENERALIZED OA: ICD-10-CM

## 2023-12-11 DIAGNOSIS — R76.8 CENTROMERE ANTIBODY POSITIVE: ICD-10-CM

## 2023-12-11 DIAGNOSIS — D64.9 LOW HEMOGLOBIN: ICD-10-CM

## 2023-12-11 DIAGNOSIS — E11.22 TYPE 2 DIABETES MELLITUS WITH STAGE 3 CHRONIC KIDNEY DISEASE, WITH LONG-TERM CURRENT USE OF INSULIN, UNSPECIFIED WHETHER STAGE 3A OR 3B CKD (H): ICD-10-CM

## 2023-12-11 DIAGNOSIS — R76.8 POSITIVE ANA (ANTINUCLEAR ANTIBODY): Primary | ICD-10-CM

## 2023-12-11 DIAGNOSIS — N18.30 TYPE 2 DIABETES MELLITUS WITH STAGE 3 CHRONIC KIDNEY DISEASE, WITH LONG-TERM CURRENT USE OF INSULIN, UNSPECIFIED WHETHER STAGE 3A OR 3B CKD (H): ICD-10-CM

## 2023-12-11 LAB
ALBUMIN SERPL BCG-MCNC: 3.9 G/DL (ref 3.5–5.2)
ALP SERPL-CCNC: 72 U/L (ref 40–150)
ALT SERPL W P-5'-P-CCNC: 11 U/L (ref 0–50)
ANION GAP SERPL CALCULATED.3IONS-SCNC: 5 MMOL/L (ref 7–15)
AST SERPL W P-5'-P-CCNC: 13 U/L (ref 0–45)
BASOPHILS # BLD AUTO: 0 10E3/UL (ref 0–0.2)
BASOPHILS NFR BLD AUTO: 1 %
BILIRUB SERPL-MCNC: 0.3 MG/DL
BUN SERPL-MCNC: 15 MG/DL (ref 8–23)
CALCIUM SERPL-MCNC: 9.3 MG/DL (ref 8.8–10.2)
CHLORIDE SERPL-SCNC: 105 MMOL/L (ref 98–107)
CREAT SERPL-MCNC: 1.06 MG/DL (ref 0.51–0.95)
CRP SERPL-MCNC: <3 MG/L
DEPRECATED HCO3 PLAS-SCNC: 28 MMOL/L (ref 22–29)
EGFRCR SERPLBLD CKD-EPI 2021: 59 ML/MIN/1.73M2
EOSINOPHIL # BLD AUTO: 0.2 10E3/UL (ref 0–0.7)
EOSINOPHIL NFR BLD AUTO: 3 %
ERYTHROCYTE [DISTWIDTH] IN BLOOD BY AUTOMATED COUNT: 12.5 % (ref 10–15)
GLUCOSE SERPL-MCNC: 162 MG/DL (ref 70–99)
HCT VFR BLD AUTO: 33.3 % (ref 35–47)
HGB BLD-MCNC: 10.8 G/DL (ref 11.7–15.7)
IMM GRANULOCYTES # BLD: 0 10E3/UL
IMM GRANULOCYTES NFR BLD: 1 %
LYMPHOCYTES # BLD AUTO: 1.9 10E3/UL (ref 0.8–5.3)
LYMPHOCYTES NFR BLD AUTO: 32 %
MCH RBC QN AUTO: 31.7 PG (ref 26.5–33)
MCHC RBC AUTO-ENTMCNC: 32.4 G/DL (ref 31.5–36.5)
MCV RBC AUTO: 98 FL (ref 78–100)
MONOCYTES # BLD AUTO: 0.6 10E3/UL (ref 0–1.3)
MONOCYTES NFR BLD AUTO: 9 %
NEUTROPHILS # BLD AUTO: 3.3 10E3/UL (ref 1.6–8.3)
NEUTROPHILS NFR BLD AUTO: 54 %
NRBC # BLD AUTO: 0 10E3/UL
NRBC BLD AUTO-RTO: 0 /100
PLATELET # BLD AUTO: 184 10E3/UL (ref 150–450)
POTASSIUM SERPL-SCNC: 4.2 MMOL/L (ref 3.4–5.3)
PROT SERPL-MCNC: 6.9 G/DL (ref 6.4–8.3)
RBC # BLD AUTO: 3.41 10E6/UL (ref 3.8–5.2)
SODIUM SERPL-SCNC: 138 MMOL/L (ref 135–145)
WBC # BLD AUTO: 5.9 10E3/UL (ref 4–11)

## 2023-12-11 PROCEDURE — 99214 OFFICE O/P EST MOD 30 MIN: CPT | Performed by: OPHTHALMOLOGY

## 2023-12-11 PROCEDURE — 36415 COLL VENOUS BLD VENIPUNCTURE: CPT

## 2023-12-11 PROCEDURE — 99215 OFFICE O/P EST HI 40 MIN: CPT | Performed by: INTERNAL MEDICINE

## 2023-12-11 PROCEDURE — 85014 HEMATOCRIT: CPT

## 2023-12-11 PROCEDURE — 80053 COMPREHEN METABOLIC PANEL: CPT

## 2023-12-11 PROCEDURE — 82955 ASSAY OF G6PD ENZYME: CPT

## 2023-12-11 PROCEDURE — 86140 C-REACTIVE PROTEIN: CPT

## 2023-12-11 PROCEDURE — G0463 HOSPITAL OUTPT CLINIC VISIT: HCPCS | Mod: 27 | Performed by: OPHTHALMOLOGY

## 2023-12-11 PROCEDURE — 92134 CPTRZ OPH DX IMG PST SGM RTA: CPT | Performed by: OPHTHALMOLOGY

## 2023-12-11 PROCEDURE — 73130 X-RAY EXAM OF HAND: CPT | Mod: 50

## 2023-12-11 PROCEDURE — G0463 HOSPITAL OUTPT CLINIC VISIT: HCPCS | Performed by: INTERNAL MEDICINE

## 2023-12-11 ASSESSMENT — PATIENT HEALTH QUESTIONNAIRE - PHQ9: SUM OF ALL RESPONSES TO PHQ QUESTIONS 1-9: 23

## 2023-12-11 ASSESSMENT — TONOMETRY
OS_IOP_MMHG: 22
OD_IOP_MMHG: 18
IOP_METHOD: TONOPEN

## 2023-12-11 ASSESSMENT — SLIT LAMP EXAM - LIDS
COMMENTS: NORMAL
COMMENTS: NORMAL

## 2023-12-11 ASSESSMENT — VISUAL ACUITY
OS_PH_SC: 20/40-2
OS_SC: 20/60+2
OD_SC: 20/25-
METHOD: SNELLEN - LINEAR

## 2023-12-11 ASSESSMENT — CUP TO DISC RATIO
OD_RATIO: 0.3
OS_RATIO: 0.3

## 2023-12-11 ASSESSMENT — EXTERNAL EXAM - RIGHT EYE: OD_EXAM: NORMAL

## 2023-12-11 ASSESSMENT — EXTERNAL EXAM - LEFT EYE: OS_EXAM: NORMAL

## 2023-12-11 ASSESSMENT — PAIN SCALES - GENERAL: PAINLEVEL: EXTREME PAIN (8)

## 2023-12-11 NOTE — PROGRESS NOTES
Hematology referral reviewed for [facility], see below.    Referral reason: Chronic anemia    Referral received via: Internal referral by [facility]    Current abnormal labs: Available in Chart Review    Outreach: Call not placed to patient regarding referral.    Plan: Triage instructions updated and sent to NPS for completion.

## 2023-12-11 NOTE — PROGRESS NOTES
I have confirmed the patient's CC, HPI and reviewed Past Medical History, Past Surgical History, Social History, Family History, Problem List, Medication List and agree with Tech note.     CC: Follow up Diabetes mellitus retinopathy     Interval history Dec 11, 2023: LCV 8/25/2022. Feels vision today is about the same as last visit. Denies flashes/floaters.     HPI: 63 year old woman with PMH of T2DM here for routine follow up. DMII x 13 years. On oral and insulin. Last hgA1c 6.4% in 12/2022.     OCT Macula 02/27/23   OD: No center involving intra-retinal fluid, stable   OS: No macular edema, stable     Assessment/plan   1. Severe NPDR with Diabetic macular edema both eyes              - improved diabetes control, her last A1C is 7.0% 09/2023    - RTC 6 months OCT mac and DFE   - Emphasized BG/BP control   - May need PRP    2. Dry eye syndrome               Artificial tears over the counter               Monitor       3. Myopia OS with secondary amblyopia              Does not wear glasses normally      4.  Pseudophakia both eyes    - s/p Yag cap 08/18/2022      RTC: 6 months DFE and OCT mac each eye for severe NPDR follow-up      Tiburcio Martin MD PhD.  Professor & Chair

## 2023-12-11 NOTE — LETTER
12/11/2023       RE: Nena Tang  [address] [place] [address]     Dear Colleague,    Thank you for referring your patient, Nena Tang, to the [facility] at [facility]. Please see a copy of my visit note below.                       12/11/2023    Chief Complaint/Reason for Visit: pos AIDA.     HPI:    Nena Tang is a 63 year old female with past medical history listed below.  She lives in an assisted living facility. She was hospitalized in Sept 2023 following suicidal ideations. Rheumatology was consulted at that time for suspected PMR. She has a history of recurrent falls. She was started on prednisone in June 2023 for suspected PMR with ongoing hip pain. She was tapered off of steroids later. Currently she is not on any steroids and she does not want to go back on any. She reports feeling weak in general and had a fall two days ago when her legs gave out. She was on leflunomide in the past which was later stopped by  as he thought her problems were secondary to mechanical causes.     Today she reports swelling of hands and feet associated with pain.   ROS neg for raynaud's, difficulty swallowing, oral ulcers.       REVIEW OF SYSTEMS    General - pos for fatigue and generalized weakness  HEENT - pos for dry mouth and skin, neg for dry eyes  Cardiovascular - neg for chest pain or sob. Says her chest feels heavy.   Respiratory - neg for cough or sob   Gastrointestinal - neg for blood in stool, pos for heartburn - on PPI  Genitourinary - neg for blood in urine   Skin - neg for skin rashes   Neuro - neg for for numbness and tingling   Hematologic - pos for easy bruising   Psych - pos for anxiety and depression  OB/GYN - s/p hysterectomy       Past Medical History:   Diagnosis Date    Acute respiratory failure with hypoxia (H) 9/4/2017    CAD (coronary artery disease)     5/2014 cath, nonbostructive  stenosis to LAD, RCA.    Chronic low back pain 1/22/2013    Cocaine abuse, in remission (H)     Fecal urgency 3/8/2012    History of heroin abuse (H)     Hyperlipidemia LDL goal <100 10/31/2010    Hypertension 7/29/2013    Illiterate 8/30/2011    Irritable bowel syndrome     Left cataract     Migraine 4/19/2012    Migraine headache 4/22/2013    Moderate major depression (H) 6/8/2011    Noncompliance with medication regimen 6/8/2011    Obesity     CINDY (obstructive sleep apnea) 3/8/2012    uses CPAP    CINDY (obstructive sleep apnea)- mild AHI 10.3     Osteopenia 10/7/2009    Pneumonia of right lower lobe due to infectious organism 9/4/2017    Schizoaffective disorder, depressive type (H) 2/25/2013    Sepsis (H) 8/29/2017    Suicidal intent 10/2/2013    Takotsubo cardiomyopathy     Type 2 diabetes mellitus (H) 8/30/2011    Uterine cancer (H) 1983    Verbal auditory hallucination 10/4/2012     Past Surgical History:   Procedure Laterality Date    CATARACT IOL, RT/LT Bilateral 2017    CHOLECYSTECTOMY      COLONOSCOPY N/A 3/16/2017    Procedure: COLONOSCOPY;  Surgeon: Traci Gonzalez MD;  Location:  GI    Coronary CTA  5/21/2014    HYSTERECTOMY  1983    uterine cancer yearly pap's per provider.    HYSTERECTOMY      LAPAROSCOPIC CHOLECYSTECTOMY  2008    PHACOEMULSIFICATION CLEAR CORNEA WITH STANDARD INTRAOCULAR LENS IMPLANT Left 5/5/2017    Procedure: PHACOEMULSIFICATION CLEAR CORNEA WITH STANDARD INTRAOCULAR LENS IMPLANT;  LEFT EYE PHACOEMULSIFICATION CLEAR CORNEA WITH STANDARD INTRAOCULAR LENS IMPLANT ;  Surgeon: Tyra Diaz MD;  Location:  EC    PHACOEMULSIFICATION CLEAR CORNEA WITH STANDARD INTRAOCULAR LENS IMPLANT Right 6/30/2017    Procedure: PHACOEMULSIFICATION CLEAR CORNEA WITH STANDARD INTRAOCULAR LENS IMPLANT;  RIGHT EYE PHACOEMULSIFICATION CLEAR CORNEA WITH STANDARD INTRAOCULAR LENS IMPLANT;  Surgeon: Tyra Diaz MD;  Location:  EC    RELEASE TRIGGER FINGER  10/11/2012    Left  thumb. Procedure: RELEASE TRIGGER FINGER;  LEFT THUMB TRIGGER RELEASE;  Surgeon: Tay Langley MD;  Location:  SD    RELEASE TRIGGER FINGER Right 2016    Procedure: RELEASE TRIGGER FINGER;  Surgeon: Albino Castañeda MD;  Location:  OR    [facility] OOPHORECTORMY FOR RAHEL, W/BX      UTERINE     Family History   Problem Relation Age of Onset    Cancer Mother         BLADDER    Respiratory Mother         COPD    Gastrointestinal Disease Mother         CIRRHOSIS OF LI BOLIVAR    Alcohol/Drug Mother     Diabetes Mother     Hypertension Mother     Lipids Mother     C.A.D. Mother     Glaucoma Mother     Alcohol/Drug Sister     Mental Illness Sister     Alcohol/Drug Sister     Psychotic Disorder Sister     Cancer Maternal Grandmother         UNKNOWN TYPE    Cancer Brother         COLON    Cancer - colorectal Brother         IN HIS LATE 30S    Alcohol/Drug Brother          OF HEROIN OVERDOSE AT AGE 22 YRS    Macular Degeneration No family hx of      Social History     Socioeconomic History    Marital status:      Spouse name: None    Number of children: 2    Years of education: None    Highest education level: None   Tobacco Use    Smoking status: Never    Smokeless tobacco: Never   Vaping Use    Vaping Use: Never used   Substance and Sexual Activity    Alcohol use: Not Currently     Comment: when younger    Drug use: No     Comment: history of    Sexual activity: Not Currently     Partners: Male     Birth control/protection: None, Condom   Other Topics Concern     Service No    Blood Transfusions No    Caffeine Concern No    Occupational Exposure No    Hobby Hazards No    Sleep Concern Yes    Stress Concern Yes    Weight Concern Yes    Special Diet Yes     Comment: DM    Back Care Yes    Exercise Yes     Comment: WALKS DAILY    Seat Belt Yes    Self-Exams No     Comment: ENCOURAGED   Social History Narrative     10/2014. Has 2 sons. 7 grandchildren.         Unemployed. Graduated HS.          Tobacco use: Denies    Alcohol use: Escalated use since   10/2014    Drug: Denies     Social Determinants of Health     Financial Resource Strain: Low Risk  (10/3/2023)    Financial Resource Strain     Within the past 12 months, have you or your family members you live with been unable to get utilities (heat, electricity) when it was really needed?: No   Food Insecurity: Low Risk  (10/3/2023)    Food Insecurity     Within the past 12 months, did you worry that your food would run out before you got money to buy more?: No     Within the past 12 months, did the food you bought just not last and you didn t have money to get more?: No   Transportation Needs: Low Risk  (10/3/2023)    Transportation Needs     Within the past 12 months, has lack of transportation kept you from medical appointments, getting your medicines, non-medical meetings or appointments, work, or from getting things that you need?: No   Interpersonal Safety: Not At Risk (2022)    Humiliation, Afraid, Rape, and Kick questionnaire     Fear of Current or Ex-Partner: No     Emotionally Abused: No     Physically Abused: No     Sexually Abused: No   Housing Stability: Low Risk  (10/3/2023)    Housing Stability     Do you have housing? : Yes     Are you worried about losing your housing?: No       Allergies   Allergen Reactions    Imidazole Antifungals Hives     Tolerates diflucan    Ketoprofen Itching     Pruritis to topical    Lisinopril Hives    Metformin Other (See Comments)     Patient hospitalized for lactic acidosis - admitting provider suspectd caused by metformin    Metronidazole Hives    Posaconazole Hives     Tolerates diflucan       Current Outpatient Medications   Medication    Alcohol Swabs (ALCOHOL PREP) 70 % PADS    alendronate (FOSAMAX) 70 MG tablet    amantadine (SYMMETREL) 100 MG capsule    aspirin (ASPIRIN LOW DOSE) 81 MG EC tablet    atorvastatin (LIPITOR) 80 MG tablet    blood glucose (NO BRAND SPECIFIED) test  strip    calcium carbonate (OS-ALLEN) 1500 (600 Ca) MG tablet    clonazePAM (KLONOPIN) 0.5 MG tablet    Continuous Blood Gluc Sensor (DEXCOM G6 SENSOR) MISC    Continuous Blood Gluc Transmit (DEXCOM G6 TRANSMITTER) MISC    diclofenac (VOLTAREN) 1 % topical gel    escitalopram (LEXAPRO) 20 MG tablet    gabapentin (NEURONTIN) 300 MG capsule    HUMALOG KWIKPEN 100 UNIT/ML soln    hydrOXYzine (VISTARIL) 25 MG capsule    insulin glargine (LANTUS PEN) 100 UNIT/ML pen    insulin lispro (HUMALOG KWIKPEN) 100 UNIT/ML (1 unit dial) KWIKPEN    insulin pen needle (NOVOFINE AUTOCOVER PEN NEEDLE) 30G X 8 MM miscellaneous    Lidocaine (LIDOCARE) 4 % Patch    losartan (COZAAR) 100 MG tablet    magnesium 250 MG tablet    metoprolol succinate ER (TOPROL XL) 50 MG 24 hr tablet    mirabegron (MYRBETRIQ) 50 MG 24 hr tablet    OneTouch Delica Lancets 33G MISC    order for DME    order for DME    paliperidone ER (INVEGA) 6 MG 24 hr tablet    pantoprazole (PROTONIX) 40 MG EC tablet    QUEtiapine (SEROQUEL) 25 MG tablet    senna-docusate (STIMULANT LAXATIVE) 8.6-50 MG tablet    thin (NO BRAND SPECIFIED) lancets    tirzepatide (MOUNJARO) 5 MG/0.5ML pen    traZODone (DESYREL) 100 MG tablet    vitamin C (ASCORBIC ACID) 500 MG tablet    zinc oxide (DESITIN) 20 % external ointment     Current Facility-Administered Medications   Medication    apraclonidine (IOPIDINE) 1 % ophthalmic solution 1 drop    lidocaine 1 % injection 4 mL    triamcinolone (KENALOG-40) injection 40 mg       PHYSICAL EXAM    BP (!) 148/81 (BP Location: Left arm, Patient Position: Sitting, Cuff Size: Adult Large)   Pulse 66   Temp 97.9  F (36.6  C) (Oral)   Wt 112 kg (247 lb)   LMP 01/06/2015 (Exact Date)   SpO2 93%   BMI 47.24 kg/m      General: Alert, No apparent distressplace, and time  Psych: Affect euthymic  Eyes: Sclera noninjected  Skin: No rashes noted  Cardiovascular: Regular rate and rhythm, Normal S1 and S2 and No murmurs, rubs or gallops  Respiratory: Clear to  auscultation with no wheezing or crackles  Neuro: Normal gait and Able to arise from seated position unassisted  Musculoskeletal: Hands:   She does have puffiness of right and left 2nd-4th MCP tenderness and swelling.   Wrists:  Normal.  Elbows:  Normal.  Shoulders:  Normal.  Feet:  Normal.  Ankles:  bilateral pitting edema  Knees:  Normal.  Hips:  Normal.  Spine:  Normal.  Tender points:  Negative.    LABS   Reviewed as below.     July and Sept 2023  CBC - Low Hb 10.9, normal platelet and wbc counts  BMP - creatinine 1.28  CK, folate, vitamin B12, TSH, Vit D, LFT  normal   C4 - 42 and C3 161 (elevated)  Anti sm, mitochondrial,RNP, SCL 70, ds dna neg  Centromere pos   AIDA pos 1:320 centromere   RF 13  CCP neg     June 2023  ESR, CRP normal     EXAM: CT CHEST PE ABDOMEN PELVIS W CONTRAST  LOCATION: [facility]  DATE: 8/17/2023     INDICATION: right sided flank pain, pain with breatihng. evaluate for PE, kidney stone  COMPARISON: CT chest 6/27/23. CT abdomen and pelvis 6/25/23.  TECHNIQUE: CT chest pulmonary angiogram and routine CT abdomen pelvis with IV contrast. Arterial phase through the chest and venous phase through the abdomen and pelvis. Multiplanar reformats and MIP reconstructions were performed. Dose reduction   techniques were used.   CONTRAST: 122 mL Isovue   370     FINDINGS:  ANGIOGRAM CHEST: No pulmonary embolus. No aortic dissection or aneurysm.     LUNGS AND PLEURA: Normal.     MEDIASTINUM/AXILLAE: Normal.     CORONARY ARTERY CALCIFICATION: Moderate.      HEPATOBILIARY: Cholecystectomy.     PANCREAS: Normal.     SPLEEN: Normal.     ADRENAL GLANDS: Normal.     KIDNEYS/BLADDER: Normal.     BOWEL: Normal. No obstruction or inflammation. Normal appendix.     LYMPH NODES: Normal.     VASCULATURE: Mild to moderate aortoiliac atherosclerosis.     PELVIC ORGANS: Hysterectomy.     MUSCULOSKELETAL: Mild degenerative changes of the spine.                                                                       IMPRESSION:  No acute process in the chest, abdomen or pelvis.      KNEE BILATERAL THREE VIEWS  6/22/2023 2:01 PM      HISTORY: Bilateral knee pain.     COMPARISON: None.                                                                       IMPRESSION:  Mild patellofemoral compartment degenerative change on  both sides. There is no evidence of an acute fracture. Joint effusion  is not assessed due to obliquity of the lateral views. Atherosclerotic  vascular calcifications.     TTE  Interpretation Summary     Left ventricular systolic function is normal.  The visual ejection fraction is estimated at 60-65%.  Right ventricular systolic pressure is elevated, consistent with mild  pulmonary hypertension. This is new compared to 2014.  The study was technically difficult.       ASSESSMENT      1. Positive AIDA (antinuclear antibody)    2. Centromere antibody positive    3. Low hemoglobin    4. Generalized OA    5. Polyarthralgia    6. Rheumatoid factor positive    7. Osteoporosis without current pathological fracture, unspecified osteoporosis type    8. Stage 3a chronic kidney disease (H)        (R76.8) Positive AIDA (antinuclear antibody)  (primary encounter diagnosis)  (R76.8) Centromere antibody positive  Comment: AIDA pos 1:320 centromere.Anti sm, mitochondrial,RNP, SCL 70, ds dna neg. RF 13. CCP neg.Today she reports swelling of hands and feet associated with pain.   ROS neg for raynaud's, difficulty swallowing, oral ulcers. She does have puffiness of right and left 2nd-4th MCP tenderness. She was on leflunomide in the past which was later stopped by  as he thought her problems were secondary to mechanical causes.Cannot use HCQ due to diabetic retinopathy. CT chest in 2023 did not reveal features of ILD. Will avoid methotrexate due to underlying CKD.   Plan:   Will not start any new med today  Check labs as below.  Recommend TTE.   Recommend PFT.   Continue PPI.   Ref to heme onc for work up  of anemia.  May consider SSZ  if G6PD is normal.   Orders Placed This Encounter   Procedures    XR Hand Bilateral G/E 3 Views    Comprehensive metabolic panel    CRP inflammation    Glucose 6 phosphate dehydrogenase    Adult Oncology/Hematology  Referral    General PFT Lab (Please always keep checked)    CBC with platelets differential       (D64.9) Low hemoglobin  Comment: noted  Plan: ref to heme     (M15.9) Generalized OA  Comment: OA of knees and hands   Plan: may use tylenol not to exceed 2 g in 24 hours as needed     (M25.50) Polyarthralgia  (R76.8) Rheumatoid factor positive  Comment: RF 13, CCP neg. She does have swelling of MCPs on exam which makes me think she does have an underlying inflammatory arthropathy.She was on leflunomide in the past which was later stopped by  as he thought her problems were secondary to mechanical causes.Cannot use HCQ due to diabetic retinopathy. CT chest in 2023 did not reveal features of ILD. Will avoid methotrexate due to underlying CKD.   Plan: as above     RTC - 4 weeks     I spent 56 minutes on the date of the encounter doing chart review, history and exam, documentation and orders per the note.      JEWEL DIOP MD    Division of Rheumatic & Autoimmune Diseases  [facility]

## 2023-12-11 NOTE — PROGRESS NOTES
12/11/2023    Chief Complaint/Reason for Visit: pos AIDA.     HPI:    Nena Tang is a 63 year old female with past medical history listed below.  She lives in an assisted living facility. She was hospitalized in Sept 2023 following suicidal ideations. Rheumatology was consulted at that time for suspected PMR. She has a history of recurrent falls. She was started on prednisone in June 2023 for suspected PMR with ongoing hip pain. She was tapered off of steroids later. Currently she is not on any steroids and she does not want to go back on any. She reports feeling weak in general and had a fall two days ago when her legs gave out. She was on leflunomide in the past which was later stopped by  as he thought her problems were secondary to mechanical causes.     Today she reports swelling of hands and feet associated with pain.   ROS neg for raynaud's, difficulty swallowing, oral ulcers.       REVIEW OF SYSTEMS    General - pos for fatigue and generalized weakness  HEENT - pos for dry mouth and skin, neg for dry eyes  Cardiovascular - neg for chest pain or sob. Says her chest feels heavy.   Respiratory - neg for cough or sob   Gastrointestinal - neg for blood in stool, pos for heartburn - on PPI  Genitourinary - neg for blood in urine   Skin - neg for skin rashes   Neuro - neg for for numbness and tingling   Hematologic - pos for easy bruising   Psych - pos for anxiety and depression  OB/GYN - s/p hysterectomy       Past Medical History:   Diagnosis Date    Acute respiratory failure with hypoxia (H) 9/4/2017    CAD (coronary artery disease)     5/2014 cath, nonbostructive stenosis to LAD, RCA.    Chronic low back pain 1/22/2013    Cocaine abuse, in remission (H)     Fecal urgency 3/8/2012    History of heroin abuse (H)     Hyperlipidemia LDL goal <100 10/31/2010    Hypertension 7/29/2013    Illiterate 8/30/2011    Irritable bowel syndrome     Left cataract     Migraine 4/19/2012     Migraine headache 4/22/2013    Moderate major depression (H) 6/8/2011    Noncompliance with medication regimen 6/8/2011    Obesity     CINDY (obstructive sleep apnea) 3/8/2012    uses CPAP    CINDY (obstructive sleep apnea)- mild AHI 10.3     Osteopenia 10/7/2009    Pneumonia of right lower lobe due to infectious organism 9/4/2017    Schizoaffective disorder, depressive type (H) 2/25/2013    Sepsis (H) 8/29/2017    Suicidal intent 10/2/2013    Takotsubo cardiomyopathy     Type 2 diabetes mellitus (H) 8/30/2011    Uterine cancer (H) 1983    Verbal auditory hallucination 10/4/2012     Past Surgical History:   Procedure Laterality Date    CATARACT IOL, RT/LT Bilateral 2017    CHOLECYSTECTOMY      COLONOSCOPY N/A 3/16/2017    Procedure: COLONOSCOPY;  Surgeon: Traci Gonzalez MD;  Location:  GI    Coronary CTA  5/21/2014    HYSTERECTOMY  1983    uterine cancer yearly pap's per provider.    HYSTERECTOMY      LAPAROSCOPIC CHOLECYSTECTOMY  2008    PHACOEMULSIFICATION CLEAR CORNEA WITH STANDARD INTRAOCULAR LENS IMPLANT Left 5/5/2017    Procedure: PHACOEMULSIFICATION CLEAR CORNEA WITH STANDARD INTRAOCULAR LENS IMPLANT;  LEFT EYE PHACOEMULSIFICATION CLEAR CORNEA WITH STANDARD INTRAOCULAR LENS IMPLANT ;  Surgeon: Tyra Diaz MD;  Location:  [facility]    PHACOEMULSIFICATION CLEAR CORNEA WITH STANDARD INTRAOCULAR LENS IMPLANT Right 6/30/2017    Procedure: PHACOEMULSIFICATION CLEAR CORNEA WITH STANDARD INTRAOCULAR LENS IMPLANT;  RIGHT EYE PHACOEMULSIFICATION CLEAR CORNEA WITH STANDARD INTRAOCULAR LENS IMPLANT;  Surgeon: Tyra Diaz MD;  Location:  [facility]    RELEASE TRIGGER FINGER  10/11/2012    Left thumb. Procedure: RELEASE TRIGGER FINGER;  LEFT THUMB TRIGGER RELEASE;  Surgeon: Tay Langley MD;  Location:  [facility]    RELEASE TRIGGER FINGER Right 12/26/2016    Procedure: RELEASE TRIGGER FINGER;  Surgeon: Albino Castañeda MD;  Location:  OR    [facility] OOPHORECTORMY FOR MALIG, W/BX  1983    UTERINE     Family  History   Problem Relation Age of Onset    Cancer Mother         BLADDER    Respiratory Mother         COPD    Gastrointestinal Disease Mother         CIRRHOSIS OF LI BOLIVAR    Alcohol/Drug Mother     Diabetes Mother     Hypertension Mother     Lipids Mother     C.A.D. Mother     Glaucoma Mother     Alcohol/Drug Sister     Mental Illness Sister     Alcohol/Drug Sister     Psychotic Disorder Sister     Cancer Maternal Grandmother         UNKNOWN TYPE    Cancer Brother         COLON    Cancer - colorectal Brother         IN HIS LATE 30S    Alcohol/Drug Brother          OF HEROIN OVERDOSE AT AGE 22 YRS    Macular Degeneration No family hx of      Social History     Socioeconomic History    Marital status:      Spouse name: None    Number of children: 2    Years of education: None    Highest education level: None   Tobacco Use    Smoking status: Never    Smokeless tobacco: Never   Vaping Use    Vaping Use: Never used   Substance and Sexual Activity    Alcohol use: Not Currently     Comment: when younger    Drug use: No     Comment: history of    Sexual activity: Not Currently     Partners: Male     Birth control/protection: None, Condom   Other Topics Concern     Service No    Blood Transfusions No    Caffeine Concern No    Occupational Exposure No    Hobby Hazards No    Sleep Concern Yes    Stress Concern Yes    Weight Concern Yes    Special Diet Yes     Comment: DM    Back Care Yes    Exercise Yes     Comment: WALKS DAILY    Seat Belt Yes    Self-Exams No     Comment: ENCOURAGED   Social History Narrative     10/2014. Has 2 sons. 7 grandchildren.         Unemployed. Graduated HS.         Tobacco use: Denies    Alcohol use: Escalated use since   10/2014    Drug: Denies     Social Determinants of Health     Financial Resource Strain: Low Risk  (10/3/2023)    Financial Resource Strain     Within the past 12 months, have you or your family members you live with been unable to get  utilities (heat, electricity) when it was really needed?: No   Food Insecurity: Low Risk  (10/3/2023)    Food Insecurity     Within the past 12 months, did you worry that your food would run out before you got money to buy more?: No     Within the past 12 months, did the food you bought just not last and you didn t have money to get more?: No   Transportation Needs: Low Risk  (10/3/2023)    Transportation Needs     Within the past 12 months, has lack of transportation kept you from medical appointments, getting your medicines, non-medical meetings or appointments, work, or from getting things that you need?: No   Interpersonal Safety: Not At Risk (8/25/2022)    Humiliation, Afraid, Rape, and Kick questionnaire     Fear of Current or Ex-Partner: No     Emotionally Abused: No     Physically Abused: No     Sexually Abused: No   Housing Stability: Low Risk  (10/3/2023)    Housing Stability     Do you have housing? : Yes     Are you worried about losing your housing?: No       Allergies   Allergen Reactions    Imidazole Antifungals Hives     Tolerates diflucan    Ketoprofen Itching     Pruritis to topical    Lisinopril Hives    Metformin Other (See Comments)     Patient hospitalized for lactic acidosis - admitting provider suspectd caused by metformin    Metronidazole Hives    Posaconazole Hives     Tolerates diflucan       Current Outpatient Medications   Medication    Alcohol Swabs (ALCOHOL PREP) 70 % PADS    alendronate (FOSAMAX) 70 MG tablet    amantadine (SYMMETREL) 100 MG capsule    aspirin (ASPIRIN LOW DOSE) 81 MG EC tablet    atorvastatin (LIPITOR) 80 MG tablet    blood glucose (NO BRAND SPECIFIED) test strip    calcium carbonate (OS-ALLEN) 1500 (600 Ca) MG tablet    clonazePAM (KLONOPIN) 0.5 MG tablet    Continuous Blood Gluc Sensor (DEXCOM G6 SENSOR) MISC    Continuous Blood Gluc Transmit (DEXCOM G6 TRANSMITTER) MISC    diclofenac (VOLTAREN) 1 % topical gel    escitalopram (LEXAPRO) 20 MG tablet    gabapentin  (NEURONTIN) 300 MG capsule    HUMALOG KWIKPEN 100 UNIT/ML soln    hydrOXYzine (VISTARIL) 25 MG capsule    insulin glargine (LANTUS PEN) 100 UNIT/ML pen    insulin lispro (HUMALOG KWIKPEN) 100 UNIT/ML (1 unit dial) KWIKPEN    insulin pen needle (NOVOFINE AUTOCOVER PEN NEEDLE) 30G X 8 MM miscellaneous    Lidocaine (LIDOCARE) 4 % Patch    losartan (COZAAR) 100 MG tablet    magnesium 250 MG tablet    metoprolol succinate ER (TOPROL XL) 50 MG 24 hr tablet    mirabegron (MYRBETRIQ) 50 MG 24 hr tablet    OneTouch Delica Lancets 33G MISC    order for DME    order for DME    paliperidone ER (INVEGA) 6 MG 24 hr tablet    pantoprazole (PROTONIX) 40 MG EC tablet    QUEtiapine (SEROQUEL) 25 MG tablet    senna-docusate (STIMULANT LAXATIVE) 8.6-50 MG tablet    thin (NO BRAND SPECIFIED) lancets    tirzepatide (MOUNJARO) 5 MG/0.5ML pen    traZODone (DESYREL) 100 MG tablet    vitamin C (ASCORBIC ACID) 500 MG tablet    zinc oxide (DESITIN) 20 % external ointment     Current Facility-Administered Medications   Medication    apraclonidine (IOPIDINE) 1 % ophthalmic solution 1 drop    lidocaine 1 % injection 4 mL    triamcinolone (KENALOG-40) injection 40 mg       PHYSICAL EXAM    BP (!) 148/81 (BP Location: Left arm, Patient Position: Sitting, Cuff Size: Adult Large)   Pulse 66   Temp 97.9  F (36.6  C) (Oral)   Wt 112 kg (247 lb)   LMP 01/06/2015 (Exact Date)   SpO2 93%   BMI 47.24 kg/m      General: Alert, No apparent distressplace, and time  Psych: Affect euthymic  Eyes: Sclera noninjected  Skin: No rashes noted  Cardiovascular: Regular rate and rhythm, Normal S1 and S2 and No murmurs, rubs or gallops  Respiratory: Clear to auscultation with no wheezing or crackles  Neuro: Normal gait and Able to arise from seated position unassisted  Musculoskeletal: Hands:   She does have puffiness of right and left 2nd-4th MCP tenderness and swelling.   Wrists:  Normal.  Elbows:  Normal.  Shoulders:  Normal.  Feet:  Normal.  Ankles:  bilateral  pitting edema  Knees:  Normal.  Hips:  Normal.  Spine:  Normal.  Tender points:  Negative.    LABS   Reviewed as below.     July and Sept 2023  CBC - Low Hb 10.9, normal platelet and wbc counts  BMP - creatinine 1.28  CK, folate, vitamin B12, TSH, Vit D, LFT  normal   C4 - 42 and C3 161 (elevated)  Anti sm, mitochondrial,RNP, SCL 70, ds dna neg  Centromere pos   AIDA pos 1:320 centromere   RF 13  CCP neg     June 2023  ESR, CRP normal     EXAM: CT CHEST PE ABDOMEN PELVIS W CONTRAST  LOCATION: [facility]  DATE: 8/17/2023     INDICATION: right sided flank pain, pain with breatihng. evaluate for PE, kidney stone  COMPARISON: CT chest 6/27/23. CT abdomen and pelvis 6/25/23.  TECHNIQUE: CT chest pulmonary angiogram and routine CT abdomen pelvis with IV contrast. Arterial phase through the chest and venous phase through the abdomen and pelvis. Multiplanar reformats and MIP reconstructions were performed. Dose reduction   techniques were used.   CONTRAST: 122 mL Isovue   370     FINDINGS:  ANGIOGRAM CHEST: No pulmonary embolus. No aortic dissection or aneurysm.     LUNGS AND PLEURA: Normal.     MEDIASTINUM/AXILLAE: Normal.     CORONARY ARTERY CALCIFICATION: Moderate.      HEPATOBILIARY: Cholecystectomy.     PANCREAS: Normal.     SPLEEN: Normal.     ADRENAL GLANDS: Normal.     KIDNEYS/BLADDER: Normal.     BOWEL: Normal. No obstruction or inflammation. Normal appendix.     LYMPH NODES: Normal.     VASCULATURE: Mild to moderate aortoiliac atherosclerosis.     PELVIC ORGANS: Hysterectomy.     MUSCULOSKELETAL: Mild degenerative changes of the spine.                                                                      IMPRESSION:  No acute process in the chest, abdomen or pelvis.      KNEE BILATERAL THREE VIEWS  6/22/2023 2:01 PM      HISTORY: Bilateral knee pain.     COMPARISON: None.                                                                       IMPRESSION:  Mild patellofemoral compartment  degenerative change on  both sides. There is no evidence of an acute fracture. Joint effusion  is not assessed due to obliquity of the lateral views. Atherosclerotic  vascular calcifications.     TTE  Interpretation Summary     Left ventricular systolic function is normal.  The visual ejection fraction is estimated at 60-65%.  Right ventricular systolic pressure is elevated, consistent with mild  pulmonary hypertension. This is new compared to 2014.  The study was technically difficult.       ASSESSMENT      1. Positive AIDA (antinuclear antibody)    2. Centromere antibody positive    3. Low hemoglobin    4. Generalized OA    5. Polyarthralgia    6. Rheumatoid factor positive    7. Osteoporosis without current pathological fracture, unspecified osteoporosis type    8. Stage 3a chronic kidney disease (H)        (R76.8) Positive AIDA (antinuclear antibody)  (primary encounter diagnosis)  (R76.8) Centromere antibody positive  Comment: AIDA pos 1:320 centromere.Anti sm, mitochondrial,RNP, SCL 70, ds dna neg. RF 13. CCP neg.Today she reports swelling of hands and feet associated with pain.   ROS neg for raynaud's, difficulty swallowing, oral ulcers. She does have puffiness of right and left 2nd-4th MCP tenderness. She was on leflunomide in the past which was later stopped by  as he thought her problems were secondary to mechanical causes.Cannot use HCQ due to diabetic retinopathy. CT chest in 2023 did not reveal features of ILD. Will avoid methotrexate due to underlying CKD.   Plan:   Will not start any new med today  Check labs as below.  Recommend TTE.   Recommend PFT.   Continue PPI.   Ref to heme onc for work up of anemia.  May consider SSZ  if G6PD is normal.   Orders Placed This Encounter   Procedures    XR Hand Bilateral G/E 3 Views    Comprehensive metabolic panel    CRP inflammation    Glucose 6 phosphate dehydrogenase    Adult Oncology/Hematology  Referral    General PFT Lab (Please always keep  checked)    CBC with platelets differential       (D64.9) Low hemoglobin  Comment: noted  Plan: ref to heme     (M15.9) Generalized OA  Comment: OA of knees and hands   Plan: may use tylenol not to exceed 2 g in 24 hours as needed     (M25.50) Polyarthralgia  (R76.8) Rheumatoid factor positive  Comment: RF 13, CCP neg. She does have swelling of MCPs on exam which makes me think she does have an underlying inflammatory arthropathy.She was on leflunomide in the past which was later stopped by  as he thought her problems were secondary to mechanical causes.Cannot use HCQ due to diabetic retinopathy. CT chest in 2023 did not reveal features of ILD. Will avoid methotrexate due to underlying CKD.   Plan: as above     RTC - 4 weeks     I spent 56 minutes on the date of the encounter doing chart review, history and exam, documentation and orders per the note.      JEWEL DIOP MD    Division of Rheumatic & Autoimmune Diseases  [facility]

## 2023-12-11 NOTE — NURSING NOTE
Chief Complaint   Patient presents with    Consult   BP (!) 148/81 (BP Location: Left arm, Patient Position: Sitting, Cuff Size: Adult Large)   Pulse 66   Temp 97.9  F (36.6  C) (Oral)   Wt 112 kg (247 lb)   LMP 01/06/2015 (Exact Date)   SpO2 93%   BMI 47.24 kg/m  Sofia Vazquez on 12/11/2023 at 9:48 AM

## 2023-12-12 ENCOUNTER — OFFICE VISIT (OUTPATIENT)
Dept: FAMILY MEDICINE | Facility: CLINIC | Age: 63
End: 2023-12-12
Payer: COMMERCIAL

## 2023-12-12 ENCOUNTER — OFFICE VISIT (OUTPATIENT)
Dept: PHARMACY | Facility: CLINIC | Age: 63
End: 2023-12-12
Payer: COMMERCIAL

## 2023-12-12 VITALS
DIASTOLIC BLOOD PRESSURE: 80 MMHG | HEART RATE: 66 BPM | RESPIRATION RATE: 19 BRPM | TEMPERATURE: 97.7 F | OXYGEN SATURATION: 94 % | WEIGHT: 247.5 LBS | SYSTOLIC BLOOD PRESSURE: 166 MMHG | HEIGHT: 61 IN | BODY MASS INDEX: 46.73 KG/M2

## 2023-12-12 DIAGNOSIS — Z12.31 ENCOUNTER FOR SCREENING MAMMOGRAM FOR BREAST CANCER: ICD-10-CM

## 2023-12-12 DIAGNOSIS — Z23 HIGH PRIORITY FOR 2019-NCOV VACCINE: ICD-10-CM

## 2023-12-12 DIAGNOSIS — Z79.4 TYPE 2 DIABETES MELLITUS WITH MILD NONPROLIFERATIVE RETINOPATHY WITHOUT MACULAR EDEMA, WITH LONG-TERM CURRENT USE OF INSULIN, UNSPECIFIED LATERALITY (H): Primary | ICD-10-CM

## 2023-12-12 DIAGNOSIS — Z71.85 VACCINE COUNSELING: ICD-10-CM

## 2023-12-12 DIAGNOSIS — K08.89 PAIN, DENTAL: ICD-10-CM

## 2023-12-12 DIAGNOSIS — I10 ESSENTIAL HYPERTENSION WITH GOAL BLOOD PRESSURE LESS THAN 130/80: ICD-10-CM

## 2023-12-12 DIAGNOSIS — F25.1 SCHIZOAFFECTIVE DISORDER, DEPRESSIVE TYPE (H): ICD-10-CM

## 2023-12-12 DIAGNOSIS — I10 HTN, GOAL BELOW 140/90: ICD-10-CM

## 2023-12-12 DIAGNOSIS — Z79.4 TYPE 2 DIABETES MELLITUS WITH HYPERGLYCEMIA, WITH LONG-TERM CURRENT USE OF INSULIN (H): ICD-10-CM

## 2023-12-12 DIAGNOSIS — E11.3299 TYPE 2 DIABETES MELLITUS WITH MILD NONPROLIFERATIVE RETINOPATHY WITHOUT MACULAR EDEMA, WITH LONG-TERM CURRENT USE OF INSULIN, UNSPECIFIED LATERALITY (H): Primary | ICD-10-CM

## 2023-12-12 DIAGNOSIS — E11.65 TYPE 2 DIABETES MELLITUS WITH HYPERGLYCEMIA, WITH LONG-TERM CURRENT USE OF INSULIN (H): ICD-10-CM

## 2023-12-12 DIAGNOSIS — K08.89 PAIN, DENTAL: Primary | ICD-10-CM

## 2023-12-12 LAB — G6PD RBC-CCNT: 8.1 U/G HB

## 2023-12-12 PROCEDURE — 99606 MTMS BY PHARM EST 15 MIN: CPT | Performed by: PHARMACIST

## 2023-12-12 PROCEDURE — 82570 ASSAY OF URINE CREATININE: CPT | Performed by: FAMILY MEDICINE

## 2023-12-12 PROCEDURE — 99607 MTMS BY PHARM ADDL 15 MIN: CPT | Performed by: PHARMACIST

## 2023-12-12 PROCEDURE — 91320 SARSCV2 VAC 30MCG TRS-SUC IM: CPT | Performed by: FAMILY MEDICINE

## 2023-12-12 PROCEDURE — 99207 PR FOOT EXAM NO CHARGE: CPT | Performed by: FAMILY MEDICINE

## 2023-12-12 PROCEDURE — 90480 ADMN SARSCOV2 VAC 1/ONLY CMP: CPT | Performed by: FAMILY MEDICINE

## 2023-12-12 PROCEDURE — 82043 UR ALBUMIN QUANTITATIVE: CPT | Performed by: FAMILY MEDICINE

## 2023-12-12 PROCEDURE — 99214 OFFICE O/P EST MOD 30 MIN: CPT | Mod: 25 | Performed by: FAMILY MEDICINE

## 2023-12-12 RX ORDER — CHLORHEXIDINE GLUCONATE ORAL RINSE 1.2 MG/ML
10 SOLUTION DENTAL 2 TIMES DAILY
Qty: 1893 ML | Refills: 3 | Status: SHIPPED | OUTPATIENT
Start: 2023-12-12

## 2023-12-12 RX ORDER — AMLODIPINE BESYLATE 5 MG/1
5 TABLET ORAL EVERY MORNING
Qty: 30 TABLET | Refills: 11 | Status: SHIPPED | OUTPATIENT
Start: 2023-12-12 | End: 2024-02-06

## 2023-12-12 ASSESSMENT — PAIN SCALES - GENERAL: PAINLEVEL: EXTREME PAIN (8)

## 2023-12-12 ASSESSMENT — PATIENT HEALTH QUESTIONNAIRE - PHQ9
SUM OF ALL RESPONSES TO PHQ QUESTIONS 1-9: 27
10. IF YOU CHECKED OFF ANY PROBLEMS, HOW DIFFICULT HAVE THESE PROBLEMS MADE IT FOR YOU TO DO YOUR WORK, TAKE CARE OF THINGS AT HOME, OR GET ALONG WITH OTHER PEOPLE: SOMEWHAT DIFFICULT
SUM OF ALL RESPONSES TO PHQ QUESTIONS 1-9: 27

## 2023-12-12 NOTE — PROGRESS NOTES
Assessment & Plan     Pain, dental  She has been having some dental issues recently including teeth falling out and tooth breaking.  They are working on getting her into see a dentist.  I have issued some low-dose naproxen for pain control and chlorhexidine mouthwash.  There is no obvious infection seen on exam today.  - naproxen (NAPROSYN) 375 MG tablet; Take 1 tablet (375 mg) by mouth 2 times daily as needed for moderate pain (denatl pain)  - chlorhexidine (PERIDEX) 0.12 % solution; Swish and spit 10 mLs in mouth 2 times daily    Essential hypertension with goal blood pressure less than 130/80  Blood pressure was elevated today.  She has been on amlodipine in the past and was actually discontinued due to better blood pressure control.  Will restart at this time.  Noted today that she does have some baseline edema just above her ankles so we will continue to monitor that.  - amLODIPine (NORVASC) 5 MG tablet; Take 1 tablet (5 mg) by mouth every morning    High priority for 2019-nCoV vaccine  COVID booster was provided today.  - COVID-19 12+ (2023-24) (PFIZER)    Encounter for screening mammogram for breast cancer  Order was placed today for a screening mammogram.  - MA Screen Bilateral w/Yaya; Future    Type 2 diabetes mellitus with hyperglycemia, with long-term current use of insulin (H)  Recent control has been satisfactory.  No changes were made to her medication list today but did go through and with the help of the [facility] pharmacy review everything today.  Foot examination was normal with excellent pulses and no callus.  Urine microalbumin will be checked today.  - FOOT EXAM  - Albumin Random Urine Quantitative with Creat Ratio; Future  - Albumin Random Urine Quantitative with Creat Ratio    Review of prior external note(s) from - [facility]Everywhere information from [facility]  reviewed         BMI:   Estimated body mass index is 46.07 kg/m  as calculated from the following:    Height as of this  "encounter: 1.561 m (5' 1.46\").    Weight as of this encounter: 112.3 kg (247 lb 8 oz).   Weight management plan: Discussed healthy diet and exercise guidelines    Depression Screening Follow Up        12/12/2023    10:11 AM   PHQ   PHQ-9 Total Score 27   Q9: Thoughts of better off dead/self-harm past 2 weeks Nearly every day   F/U: Thoughts of suicide or self-harm Yes   F/U: Self harm-plan Yes   F/U: Self-harm action No   F/U: Safety concerns No                 Follow Up      Follow Up Actions Taken  Crisis resource information provided in the After Visit Summary  Referred patient back to mental health provider    Discussed the following ways the patient can remain in a safe environment:    FUTURE APPOINTMENTS:       - Follow-up visit in after her next specialist appointments    Albino Rainey MD  [facility]    Surjit Barrett is a 63 year old, presenting for the following health issues:  Recheck Medication, Follow Up, and Imm/Inj (COVID-19 VACCINE)        12/12/2023    10:08 AM   Additional Questions   Roomed by Karma Kerns   Accompanied by SUPERVISOR caretaker Uyen       History of Present Illness       Reason for visit:  Follow up    She eats 2-3 servings of fruits and vegetables daily.She consumes 2 sweetened beverage(s) daily.She exercises with enough effort to increase her heart rate 9 or less minutes per day.  She exercises with enough effort to increase her heart rate 3 or less days per week.   She is taking medications regularly.           Patient was seen today for regularly scheduled follow-up.  We reviewed recent specialist consultations.  She also had gotten hooked up with not just 1 but 2 psychiatric care providers in [facility] and [place].  They may have both been making changes to her medications and the staff have elected to continue with [facility] at this time.  Her medication list was reviewed and closely updated today based on a list provided " "by the facility.  We did have the primary person at the facility attend the visit by speaker phone today and the patient was accompanied by other staff from the facility.  She has had some dental pain recently.  Couple teeth have broken and fallen out and they are working at this time on finding a dentist.  The patient herself did not express a lot of other concerns today.      Review of Systems   Constitutional, HEENT, cardiovascular, pulmonary, gi and gu systems are negative, except as otherwise noted.      Objective    BP (!) 166/80   Pulse 66   Temp 97.7  F (36.5  C) (Temporal)   Resp 19   Ht 1.561 m (5' 1.46\")   Wt 112.3 kg (247 lb 8 oz)   LMP 01/06/2015 (Exact Date)   SpO2 94%   BMI 46.07 kg/m    Body mass index is 46.07 kg/m .  Physical Exam   GENERAL: healthy, alert and no distress  EYES: Eyes grossly normal to inspection, PERRL and conjunctivae and sclerae normal  HENT: ear canals and TM's normal, nose and mouth without ulcers or lesions  HENT: normal cephalic/atraumatic, ear canals and TM's normal, nose and mouth without ulcers or lesions, oropharynx clear, oral mucous membranes moist, and a few broken teeth are noted without evidence of infection  NECK: no adenopathy, no asymmetry, masses, or scars and thyroid normal to palpation  RESP: lungs clear to auscultation - no rales, rhonchi or wheezes  CV: regular rate and rhythm, normal S1 S2, no S3 or S4, no murmur, click or rub, no peripheral edema and peripheral pulses strong  MS: no gross musculoskeletal defects noted, no edema  SKIN: no suspicious lesions or rashes  NEURO: Normal strength and tone, mentation intact and speech normal  PSYCH: mentation appears normal and affect flat  Diabetic foot exam: normal DP and PT pulses, no trophic changes or ulcerative lesions, and normal sensory exam    Lab on 12/11/2023   Component Date Value Ref Range Status    Sodium 12/11/2023 138  135 - 145 mmol/L Final    Reference intervals for this test were updated " on 09/26/2023 to more accurately reflect our healthy population. There may be differences in the flagging of prior results with similar values performed with this method. Interpretation of those prior results can be made in the context of the updated reference intervals.     Potassium 12/11/2023 4.2  3.4 - 5.3 mmol/L Final    Carbon Dioxide (CO2) 12/11/2023 28  22 - 29 mmol/L Final    Anion Gap 12/11/2023 5 (L)  7 - 15 mmol/L Final    Urea Nitrogen 12/11/2023 15.0  8.0 - 23.0 mg/dL Final    Creatinine 12/11/2023 1.06 (H)  0.51 - 0.95 mg/dL Final    GFR Estimate 12/11/2023 59 (L)  >60 mL/min/1.73m2 Final    Calcium 12/11/2023 9.3  8.8 - 10.2 mg/dL Final    Chloride 12/11/2023 105  98 - 107 mmol/L Final    Glucose 12/11/2023 162 (H)  70 - 99 mg/dL Final    Alkaline Phosphatase 12/11/2023 72  40 - 150 U/L Final    Reference intervals for this test were updated on 11/14/2023 to more accurately reflect our healthy population. There may be differences in the flagging of prior results with similar values performed with this method. Interpretation of those prior results can be made in the context of the updated reference intervals.    AST 12/11/2023 13  0 - 45 U/L Final    Reference intervals for this test were updated on 6/12/2023 to more accurately reflect our healthy population. There may be differences in the flagging of prior results with similar values performed with this method. Interpretation of those prior results can be made in the context of the updated reference intervals.    ALT 12/11/2023 11  0 - 50 U/L Final    Reference intervals for this test were updated on 6/12/2023 to more accurately reflect our healthy population. There may be differences in the flagging of prior results with similar values performed with this method. Interpretation of those prior results can be made in the context of the updated reference intervals.      Protein Total 12/11/2023 6.9  6.4 - 8.3 g/dL Final    Albumin 12/11/2023 3.9  3.5  - 5.2 g/dL Final    Bilirubin Total 12/11/2023 0.3  <=1.2 mg/dL Final    CRP Inflammation 12/11/2023 <3.00  <5.00 mg/L Final    Glucose-6-PO4 Dehydrogenase 12/11/2023 8.1 (L)  9.9 - 16.6 U/g Hb Final    Performed By: [facility]  [address] [place] [address]  : Washington Mae MD, PhD  CLIA Number: [id number]    WBC Count 12/11/2023 5.9  4.0 - 11.0 10e3/uL Final    RBC Count 12/11/2023 3.41 (L)  3.80 - 5.20 10e6/uL Final    Hemoglobin 12/11/2023 10.8 (L)  11.7 - 15.7 g/dL Final    Hematocrit 12/11/2023 33.3 (L)  35.0 - 47.0 % Final    MCV 12/11/2023 98  78 - 100 fL Final    MCH 12/11/2023 31.7  26.5 - 33.0 pg Final    MCHC 12/11/2023 32.4  31.5 - 36.5 g/dL Final    RDW 12/11/2023 12.5  10.0 - 15.0 % Final    Platelet Count 12/11/2023 184  150 - 450 10e3/uL Final    % Neutrophils 12/11/2023 54  % Final    % Lymphocytes 12/11/2023 32  % Final    % Monocytes 12/11/2023 9  % Final    % Eosinophils 12/11/2023 3  % Final    % Basophils 12/11/2023 1  % Final    % Immature Granulocytes 12/11/2023 1  % Final    NRBCs per 100 WBC 12/11/2023 0  <1 /100 Final    Absolute Neutrophils 12/11/2023 3.3  1.6 - 8.3 10e3/uL Final    Absolute Lymphocytes 12/11/2023 1.9  0.8 - 5.3 10e3/uL Final    Absolute Monocytes 12/11/2023 0.6  0.0 - 1.3 10e3/uL Final    Absolute Eosinophils 12/11/2023 0.2  0.0 - 0.7 10e3/uL Final    Absolute Basophils 12/11/2023 0.0  0.0 - 0.2 10e3/uL Final    Absolute Immature Granulocytes 12/11/2023 0.0  <=0.4 10e3/uL Final    Absolute NRBCs 12/11/2023 0.0  10e3/uL Final

## 2023-12-13 ENCOUNTER — TELEPHONE (OUTPATIENT)
Dept: RHEUMATOLOGY | Facility: CLINIC | Age: 63
End: 2023-12-13
Payer: COMMERCIAL

## 2023-12-13 DIAGNOSIS — M25.50 POLYARTHRALGIA: Primary | ICD-10-CM

## 2023-12-13 LAB
CREAT UR-MCNC: 145 MG/DL
MICROALBUMIN UR-MCNC: 42.2 MG/L
MICROALBUMIN/CREAT UR: 29.1 MG/G CR (ref 0–25)

## 2023-12-13 RX ORDER — LEFLUNOMIDE 20 MG/1
20 TABLET ORAL DAILY
Qty: 90 TABLET | Refills: 0 | Status: SHIPPED | OUTPATIENT
Start: 2023-12-13 | End: 2024-02-28

## 2023-12-13 NOTE — TELEPHONE ENCOUNTER
Patient called and she would like to restart.  Uses [facility] pharmacy.  RX is pended.  Please add sig.    Isabell Casas RN

## 2023-12-13 NOTE — TELEPHONE ENCOUNTER
----- Message from Alexus Posey MD sent at 12/13/2023  2:43 PM CST -----  Could you pls check with her if she wants to restart the leflunomide? Thank you.

## 2024-01-01 NOTE — PROGRESS NOTES
Medication Therapy Management (MTM) Encounter    ASSESSMENT:                            Medication Adherence/Access: No issues identified    Diabetes:   Patient is meeting A1c goal of < 8%.  Patient is not meeting goal of > 50% time in target with continuous glucose monitoring.   Recent Mounjaro start, glucose is highly variable likely due to diet. Continue Mounjaro at this dose until endocrine follow-up in 6 weeks. Work with endocrine or MTM for dose titrations, will review endocrine notes at next appointment regarding plan.       Schizoaffective:   Encouraged her to avoid long naps as much as possible during the day, likely interfering with her ability to sleep well at night. She will follow-up with psychiatry on mood and medication changes as scheduled.    Hypertension:  Blood pressure is not at goal <140/90 and persistently elevated. She previously tolerated amlodipine without problems, discussed with PCP and will restart.    Dental problems:  OK to use nsaid sparingly, caution with CKD and consider SCr recheck with next lab draw to monitor. See PCP notes for additional details.     Vaccine:  Willing to have Covid vaccine today. Will further discuss RSV vaccine at another appointment.    PLAN:                            Start amlodipine 5mg daily    Start naproxen and chlorhexadine rinse per PCP    Covid vaccine today    Follow-up: 2 months    SUBJECTIVE/OBJECTIVE:                          Nena Tang is a 63 year old female coming in for a follow-up visit from 11/8/23. Today's visit is a co-visit with Albino Rainey MD.    Patient was accompanied by group home staff.  DAVIN Neves also on the phone for visit.    Reason for visit: medication recheck.    Allergies/ADRs: Reviewed in chart  Past Medical History: Reviewed in chart  Tobacco: She reports that she has never smoked. She has never used smokeless tobacco.  Alcohol: not currently using    Medication Adherence/Access: no issues reported  Patient has  assistance with medication administration: group home.    Diabetes Type 2 Diabetes:    Lantus 22 units twice daily  Humalog sliding scale 8 units + 3u per 50mg/dL starting glucose 151 and max 23u  Mounjaro 5mg weekly - has taken 1 dose. Follow-up with [facility] endocrine in 6 weeks  Side effects: constipation, manages with senna-docusate.    Blood sugar monitoring: Continuous Glucose Monitor (AGP below)            Current diabetes symptoms: none  Diet/Exercise: eating less sugar at [facility], staff working with her on this.           Eye exam is up to date  Foot exam: due  Urine Albumin:   Lab Results   Component Value Date    UMALCR 10.19 08/17/2022      Lab Results   Component Value Date    A1C 7.0 (H) 09/21/2023        Schizoaffective:  Quetiapine 125mg at bedtime   Paliperidone ER 12mg at bedtime  Escitalopram 20mg daily  Hydroxyzine 25mg in the evening   Trazodone 100mg at bedtime  Clonazepam 0.5mg at bedtime  Amantadine 100mg daily  She has been feeling more down lately, worried.  Not sleeping well. She is napping more during the day, napping at [facility].  Continues to feel short of breath on and off, O2 is 97%+.   Followed by outside psychiatrist at [place], he has an appt in January.     Hypertension:   Losartan 100mg daily  Metoprolol XL 50mg AM and 100mg PM  Patient reports no current medication side effects.  BP Readings from Last 3 Encounters:   12/12/23 (!) 166/80   12/11/23 (!) 148/81   11/08/23 (!) 140/78     Pulse Readings from Last 3 Encounters:   12/12/23 66   12/11/23 66   11/07/23 77        Dental problem:   Per RN, patients teeth have been falling out and trying to get her into the dentist. Wondering if patient can use antibiotic mouth wash and address dental pain. Acetaminophen has not been effective.    Vaccine:  Due for Covid vaccine. Also eligible for RSV vaccine.    Today's Vitals: LMP 01/06/2015 (Exact Date)   BP Readings from Last 1 Encounters:   12/12/23 (!) 166/80  "    Pulse Readings from Last 1 Encounters:   12/12/23 66     Wt Readings from Last 1 Encounters:   12/12/23 247 lb 8 oz (112.3 kg)     Ht Readings from Last 1 Encounters:   12/12/23 5' 1.46\" (1.561 m)     Estimated body mass index is 46.07 kg/m  as calculated from the following:    Height as of an earlier encounter on 12/12/23: 5' 1.46\" (1.561 m).    Weight as of an earlier encounter on 12/12/23: 247 lb 8 oz (112.3 kg).    Temp Readings from Last 1 Encounters:   12/12/23 97.7  F (36.5  C) (Temporal)      ----------------      I spent 30 minutes with this patient today. All changes were made via collaborative practice agreement with Albino Rainey MD. A copy of the visit note was provided to the patient's provider(s).    A summary of these recommendations was given to the patient (see AVS from today's appointment with PCP).    Eugenia De Jesus, PharmD, BCACP   Medication Therapy Management Pharmacist   [facility]  [phone]          Medication Therapy Recommendations  Essential hypertension with goal blood pressure less than 130/80    Current Medication: metoprolol succinate ER (TOPROL XL) 50 MG 24 hr tablet   Rationale: Synergistic therapy - Needs additional medication therapy - Indication   Recommendation: Start Medication - amLODIPine 5 MG tablet   Status: Accepted per Provider         Vaccine counseling    Rationale: Preventive therapy - Needs additional medication therapy - Indication   Recommendation: Start Medication - COVID-19 MRNA VACCINE (PFIZER) IM   Status: Accepted - no CPA Needed            " knowledge deficit

## 2024-01-03 DIAGNOSIS — R30.0 DYSURIA: ICD-10-CM

## 2024-01-03 DIAGNOSIS — K59.00 CONSTIPATION, UNSPECIFIED CONSTIPATION TYPE: ICD-10-CM

## 2024-01-03 DIAGNOSIS — F25.1 SCHIZOAFFECTIVE DISORDER, DEPRESSIVE TYPE (H): Primary | ICD-10-CM

## 2024-01-03 DIAGNOSIS — F51.04 PSYCHOPHYSIOLOGICAL INSOMNIA: ICD-10-CM

## 2024-01-03 RX ORDER — ASCORBIC ACID 500 MG
TABLET ORAL
Qty: 112 TABLET | Refills: 4 | Status: SHIPPED | OUTPATIENT
Start: 2024-01-03 | End: 2024-05-21

## 2024-01-06 RX ORDER — DOCUSATE SODIUM 50 MG AND SENNOSIDES 8.6 MG 8.6; 5 MG/1; MG/1
TABLET, FILM COATED ORAL
Qty: 56 TABLET | Refills: 1 | Status: SHIPPED | OUTPATIENT
Start: 2024-01-06 | End: 2024-03-26

## 2024-01-06 RX ORDER — CLONAZEPAM 0.5 MG/1
TABLET ORAL
Qty: 28 TABLET | Refills: 5 | Status: SHIPPED | OUTPATIENT
Start: 2024-01-06 | End: 2024-06-18

## 2024-01-08 ENCOUNTER — MYC MEDICAL ADVICE (OUTPATIENT)
Dept: PHARMACY | Facility: CLINIC | Age: 64
End: 2024-01-08
Payer: COMMERCIAL

## 2024-01-08 ENCOUNTER — MYC MEDICAL ADVICE (OUTPATIENT)
Dept: FAMILY MEDICINE | Facility: CLINIC | Age: 64
End: 2024-01-08
Payer: COMMERCIAL

## 2024-01-08 ENCOUNTER — MYC REFILL (OUTPATIENT)
Dept: FAMILY MEDICINE | Facility: CLINIC | Age: 64
End: 2024-01-08
Payer: COMMERCIAL

## 2024-01-08 DIAGNOSIS — E11.3299 TYPE 2 DIABETES MELLITUS WITH MILD NONPROLIFERATIVE RETINOPATHY WITHOUT MACULAR EDEMA, WITH LONG-TERM CURRENT USE OF INSULIN, UNSPECIFIED LATERALITY (H): ICD-10-CM

## 2024-01-08 DIAGNOSIS — N18.30 TYPE 2 DIABETES MELLITUS WITH STAGE 3 CHRONIC KIDNEY DISEASE, WITH LONG-TERM CURRENT USE OF INSULIN, UNSPECIFIED WHETHER STAGE 3A OR 3B CKD (H): ICD-10-CM

## 2024-01-08 DIAGNOSIS — R19.7 DIARRHEA, UNSPECIFIED TYPE: Primary | ICD-10-CM

## 2024-01-08 DIAGNOSIS — E11.22 TYPE 2 DIABETES MELLITUS WITH STAGE 3 CHRONIC KIDNEY DISEASE, WITH LONG-TERM CURRENT USE OF INSULIN, UNSPECIFIED WHETHER STAGE 3A OR 3B CKD (H): ICD-10-CM

## 2024-01-08 DIAGNOSIS — M54.2 CERVICALGIA: ICD-10-CM

## 2024-01-08 DIAGNOSIS — Z79.4 TYPE 2 DIABETES MELLITUS WITH MILD NONPROLIFERATIVE RETINOPATHY WITHOUT MACULAR EDEMA, WITH LONG-TERM CURRENT USE OF INSULIN, UNSPECIFIED LATERALITY (H): ICD-10-CM

## 2024-01-08 DIAGNOSIS — Z79.4 TYPE 2 DIABETES MELLITUS WITH STAGE 3 CHRONIC KIDNEY DISEASE, WITH LONG-TERM CURRENT USE OF INSULIN, UNSPECIFIED WHETHER STAGE 3A OR 3B CKD (H): ICD-10-CM

## 2024-01-08 RX ORDER — LIDOCAINE 4 G/G
PATCH TOPICAL
Qty: 30 PATCH | Refills: 1 | OUTPATIENT
Start: 2024-01-08

## 2024-01-08 RX ORDER — LOPERAMIDE HYDROCHLORIDE 2 MG/1
2 TABLET ORAL 4 TIMES DAILY PRN
Qty: 30 TABLET | Refills: 0 | Status: SHIPPED | OUTPATIENT
Start: 2024-01-08 | End: 2024-07-22

## 2024-01-08 RX ORDER — LIDOCAINE PAIN RELIEF 40 MG/1000MG
PATCH TOPICAL
Qty: 30 PATCH | Refills: 1 | Status: SHIPPED | OUTPATIENT
Start: 2024-01-08 | End: 2024-03-12

## 2024-01-08 NOTE — TELEPHONE ENCOUNTER
Refilled test strips per [place] CPA.    Sherly Montana, PharmD  Medication Therapy Management Pharmacist  [phone]

## 2024-01-11 DIAGNOSIS — E11.3299 TYPE 2 DIABETES MELLITUS WITH MILD NONPROLIFERATIVE RETINOPATHY WITHOUT MACULAR EDEMA, WITH LONG-TERM CURRENT USE OF INSULIN, UNSPECIFIED LATERALITY (H): ICD-10-CM

## 2024-01-11 DIAGNOSIS — E11.22 TYPE 2 DIABETES MELLITUS WITH STAGE 3 CHRONIC KIDNEY DISEASE, WITH LONG-TERM CURRENT USE OF INSULIN, UNSPECIFIED WHETHER STAGE 3A OR 3B CKD (H): ICD-10-CM

## 2024-01-11 DIAGNOSIS — Z79.4 TYPE 2 DIABETES MELLITUS WITH MILD NONPROLIFERATIVE RETINOPATHY WITHOUT MACULAR EDEMA, WITH LONG-TERM CURRENT USE OF INSULIN, UNSPECIFIED LATERALITY (H): ICD-10-CM

## 2024-01-11 DIAGNOSIS — R11.0 NAUSEA: ICD-10-CM

## 2024-01-11 DIAGNOSIS — R21 RASH AND NONSPECIFIC SKIN ERUPTION: ICD-10-CM

## 2024-01-11 DIAGNOSIS — N18.30 TYPE 2 DIABETES MELLITUS WITH STAGE 3 CHRONIC KIDNEY DISEASE, WITH LONG-TERM CURRENT USE OF INSULIN, UNSPECIFIED WHETHER STAGE 3A OR 3B CKD (H): ICD-10-CM

## 2024-01-11 DIAGNOSIS — Z79.4 TYPE 2 DIABETES MELLITUS WITH STAGE 3 CHRONIC KIDNEY DISEASE, WITH LONG-TERM CURRENT USE OF INSULIN, UNSPECIFIED WHETHER STAGE 3A OR 3B CKD (H): ICD-10-CM

## 2024-01-11 RX ORDER — ONDANSETRON 4 MG/1
4 TABLET, FILM COATED ORAL EVERY 8 HOURS PRN
Qty: 10 TABLET | Refills: 1 | Status: SHIPPED | OUTPATIENT
Start: 2024-01-11

## 2024-01-11 RX ORDER — NYSTATIN 100000 [USP'U]/G
POWDER TOPICAL
Qty: 30 G | Refills: 1 | Status: SHIPPED | OUTPATIENT
Start: 2024-01-11 | End: 2024-02-29

## 2024-01-11 RX ORDER — LANCETS 33 GAUGE
EACH MISCELLANEOUS
Qty: 100 EACH | Refills: 1 | Status: SHIPPED | OUTPATIENT
Start: 2024-01-11

## 2024-01-12 RX ORDER — INSULIN GLARGINE 100 [IU]/ML
22 INJECTION, SOLUTION SUBCUTANEOUS 2 TIMES DAILY
Qty: 45 ML | Refills: 1 | Status: SHIPPED | OUTPATIENT
Start: 2024-01-12 | End: 2024-05-24

## 2024-01-12 NOTE — TELEPHONE ENCOUNTER
Oscar Graham 100UNIT/ML SOPN   Last Written Prescription Date:  10/24/2023  Last Fill Quantity: 15,   # refills: 0  Last Office Visit : 11/7/2023  Future Office visit:  1/30/2024    Routing refill request to provider for review/approval because:  Drug not on the [facility], [facility] or  Health refill protocol or controlled substance    Ada Khan RN  Central Triage Red Flags/Med Refills

## 2024-01-16 ENCOUNTER — OFFICE VISIT (OUTPATIENT)
Dept: FAMILY MEDICINE | Facility: CLINIC | Age: 64
End: 2024-01-16
Payer: COMMERCIAL

## 2024-01-16 ENCOUNTER — OFFICE VISIT (OUTPATIENT)
Dept: PHARMACY | Facility: CLINIC | Age: 64
End: 2024-01-16
Payer: COMMERCIAL

## 2024-01-16 VITALS
WEIGHT: 242 LBS | RESPIRATION RATE: 13 BRPM | OXYGEN SATURATION: 95 % | DIASTOLIC BLOOD PRESSURE: 81 MMHG | HEIGHT: 61 IN | BODY MASS INDEX: 45.69 KG/M2 | TEMPERATURE: 97.9 F | HEART RATE: 72 BPM | SYSTOLIC BLOOD PRESSURE: 155 MMHG

## 2024-01-16 DIAGNOSIS — E11.22 TYPE 2 DIABETES MELLITUS WITH STAGE 3A CHRONIC KIDNEY DISEASE, WITH LONG-TERM CURRENT USE OF INSULIN (H): Primary | ICD-10-CM

## 2024-01-16 DIAGNOSIS — I10 HTN, GOAL BELOW 140/90: ICD-10-CM

## 2024-01-16 DIAGNOSIS — E66.01 MORBID OBESITY (H): ICD-10-CM

## 2024-01-16 DIAGNOSIS — E11.3299 TYPE 2 DIABETES MELLITUS WITH MILD NONPROLIFERATIVE RETINOPATHY WITHOUT MACULAR EDEMA, WITH LONG-TERM CURRENT USE OF INSULIN, UNSPECIFIED LATERALITY (H): Primary | ICD-10-CM

## 2024-01-16 DIAGNOSIS — J69.0 ASPIRATION PNEUMONIA OF BOTH LUNGS DUE TO VOMIT, UNSPECIFIED PART OF LUNG (H): ICD-10-CM

## 2024-01-16 DIAGNOSIS — R11.2 NAUSEA AND VOMITING, UNSPECIFIED VOMITING TYPE: ICD-10-CM

## 2024-01-16 DIAGNOSIS — N18.31 TYPE 2 DIABETES MELLITUS WITH STAGE 3A CHRONIC KIDNEY DISEASE, WITH LONG-TERM CURRENT USE OF INSULIN (H): Primary | ICD-10-CM

## 2024-01-16 DIAGNOSIS — R06.02 SHORTNESS OF BREATH: ICD-10-CM

## 2024-01-16 DIAGNOSIS — Z79.4 TYPE 2 DIABETES MELLITUS WITH MILD NONPROLIFERATIVE RETINOPATHY WITHOUT MACULAR EDEMA, WITH LONG-TERM CURRENT USE OF INSULIN, UNSPECIFIED LATERALITY (H): Primary | ICD-10-CM

## 2024-01-16 DIAGNOSIS — I10 ESSENTIAL HYPERTENSION WITH GOAL BLOOD PRESSURE LESS THAN 130/80: ICD-10-CM

## 2024-01-16 DIAGNOSIS — F25.1 SCHIZOAFFECTIVE DISORDER, DEPRESSIVE TYPE (H): ICD-10-CM

## 2024-01-16 DIAGNOSIS — R07.81 RIB PAIN ON RIGHT SIDE: ICD-10-CM

## 2024-01-16 DIAGNOSIS — Z79.4 TYPE 2 DIABETES MELLITUS WITH STAGE 3A CHRONIC KIDNEY DISEASE, WITH LONG-TERM CURRENT USE OF INSULIN (H): Primary | ICD-10-CM

## 2024-01-16 PROBLEM — J18.9 PNEUMONIA: Status: RESOLVED | Noted: 2019-07-28 | Resolved: 2024-01-16

## 2024-01-16 PROCEDURE — 99605 MTMS BY PHARM NP 15 MIN: CPT | Performed by: PHARMACIST

## 2024-01-16 PROCEDURE — 99607 MTMS BY PHARM ADDL 15 MIN: CPT | Performed by: PHARMACIST

## 2024-01-16 PROCEDURE — 99214 OFFICE O/P EST MOD 30 MIN: CPT | Performed by: FAMILY MEDICINE

## 2024-01-16 RX ORDER — ALBUTEROL SULFATE 90 UG/1
2 AEROSOL, METERED RESPIRATORY (INHALATION) EVERY 6 HOURS PRN
Qty: 18 G | Refills: 3 | Status: SHIPPED | OUTPATIENT
Start: 2024-01-16

## 2024-01-16 RX ORDER — PSEUDOEPHED/ACETAMINOPH/DIPHEN 30MG-500MG
1000 TABLET ORAL EVERY 8 HOURS PRN
COMMUNITY
Start: 2024-01-11 | End: 2024-04-09

## 2024-01-16 RX ORDER — PRAZOSIN HYDROCHLORIDE 1 MG/1
1 CAPSULE ORAL AT BEDTIME
COMMUNITY

## 2024-01-16 ASSESSMENT — PATIENT HEALTH QUESTIONNAIRE - PHQ9: SUM OF ALL RESPONSES TO PHQ QUESTIONS 1-9: 23

## 2024-01-16 ASSESSMENT — PAIN SCALES - GENERAL: PAINLEVEL: SEVERE PAIN (6)

## 2024-01-16 NOTE — LETTER
"Recommended To-Do List      Prepared on: 01/16/2024       You can get the best results from your medications by completing the items on this \"To-Do List.\"      Bring your To-Do List when you go to your doctor. And, share it with your family or caregivers.    My To-Do List:  What we talked about: What I should do:   An issue with your medication    Stop taking Mounjaro          What we talked about: What I should do:                     "

## 2024-01-16 NOTE — LETTER
_  Medication List        Prepared on: 01/16/2024     Bring your Medication List when you go to the doctor, hospital, or   emergency room. And, share it with your family or caregivers.     Note any changes to how you take your medications.  Cross out medications when you no longer use them.    Medication How I take it Why I use it Prescriber   acetaminophen (TYLENOL) 500 MG tablet Take 1,000 mg by mouth every 8 hours as needed for mild pain  Pain Patient Reported   albuterol (PROAIR HFA/PROVENTIL HFA/VENTOLIN HFA) 108 (90 Base) MCG/ACT inhaler Inhale 2 puffs into the lungs every 6 hours as needed for shortness of breath, wheezing or cough Shortness of Breath Albino Rainey MD   Alcohol Swabs (ALCOHOL PREP) 70 % PADS APPLY 1 UNITS TOPICALLY 8 TIMES DAILY Type 2 diabetes mellitus with mild nonproliferative retinopathy without macular edema, with long-term current use of insulin, unspecified laterality (H) Albino Rainey MD   alendronate (FOSAMAX) 70 MG tablet Take 1 tablet (70 mg) by mouth every 7 days Osteoporosis without current pathological fracture, unspecified osteoporosis type Albino Rainey MD   amantadine (SYMMETREL) 100 MG capsule TAKE 1 CAPSULE BY MOUTH DAILY Neuroleptic-Induced Tardive Dyskinesia Albino Rainey MD   amLODIPine (NORVASC) 5 MG tablet Take 1 tablet (5 mg) by mouth every morning Essential hypertension with goal blood pressure less than 130/80 Albino Rainey MD   aspirin (ASPIRIN LOW DOSE) 81 MG EC tablet Take 1 tablet (81 mg) by mouth daily Coronary artery disease involving native heart with angina pectoris, unspecified vessel or lesion type (H) Albino Rainey MD   atorvastatin (LIPITOR) 80 MG tablet TAKE 1 TABLET BY MOUTH DAILY Hyperlipidemia LDL Goal <100 Albino Rainey MD   blood glucose (NO BRAND SPECIFIED) test strip Use to test blood sugar 10 times daily or as directed. Type 2 diabetes mellitus with stage 3  chronic kidney disease, with long-term current use of insulin, unspecified whether stage 3a or 3b CKD (H) Albino Rainey MD   calcium carbonate (OS-ALLEN) 1500 (600 Ca) MG tablet TAKE 1 TABLET BY MOUTH DAILY Osteoporosis without current pathological fracture, unspecified osteoporosis type Albino Rainey MD   chlorhexidine (PERIDEX) 0.12 % solution Swish and spit 10 mLs in mouth 2 times daily Pain, dental Albino Rainey MD   clonazePAM (KLONOPIN) 0.5 MG tablet TAKE 1 TABLET BY MOUTH EVERY NIGHT AS NEEDED FOR ANXIETY Schizoaffective Disorder, Depressive Type (H); Psychophysiological Insomnia Albino Rainey MD   Continuous Blood Gluc Sensor (DEXCOM G6 SENSOR) MISC Change every 10 days. Type 2 diabetes mellitus with mild nonproliferative retinopathy without macular edema, with long-term current use of insulin, unspecified laterality (H) Albino Rainey MD   Continuous Blood Gluc Transmit (DEXCOM G6 TRANSMITTER) MISC Change every 3 months. Type 2 diabetes mellitus with mild nonproliferative retinopathy without macular edema, with long-term current use of insulin, unspecified laterality (H) ART Baumann CNP   diclofenac (VOLTAREN) 1 % topical gel Apply 4 grams to painful area at bedtime. May use an additional 3 doses during the day as needed Rib pain on right side Albino Rainey MD   gabapentin (NEURONTIN) 300 MG capsule Take 1 capsule (300 mg) by mouth at bedtime Schizoaffective Disorder, Depressive Type (H); Chronic right-sided low back pain without sciatica Albino Rainey MD   HUMALOG KWIKPEN 100 UNIT/ML soln Inject 0-12 Units Subcutaneous at bedtime BEDTIME-If >4 hours since last Humalog injection For  - 249 give 3 units. For  - 299 give 6 units. For  - 349 give 9 units. For BG = or > 350 give 12 units. Type 2 diabetes mellitus with stage 3 chronic kidney disease, with long-term current use of insulin, unspecified  whether stage 3a or 3b CKD (H) Chanell Duque PA-C   hydrOXYzine (VISTARIL) 25 MG capsule TAKE 1 CAPSULE BY MOUTH EVERY NIGHT AT BEDTIME ANXIETY/SLEEP Psychophysiological Insomnia Albino Rainey MD   insulin glargine (LANTUS SOLOSTAR) 100 UNIT/ML pen Inject 22 Units Subcutaneous 2 times daily Type 2 diabetes mellitus with stage 3 chronic kidney disease, with long-term current use of insulin, unspecified whether stage 3a or 3b CKD (H) Chanell Duque PA-C   insulin pen needle (NOVOFINE AUTOCOVER PEN NEEDLE) 30G X 8 MM miscellaneous USE 6 DAILY OR AS DIRECTED Type 2 diabetes mellitus with mild nonproliferative retinopathy without macular edema, with long-term current use of insulin, unspecified laterality (H) Albino Rainey MD   Lancets (ONETOUCH DELICA PLUS [id number]) MISC 1 EACH AS NEEDED  USE TO TEST BLOOD SUGARS 1-2 TIMES DAILY AS DIRECTED Type 2 diabetes mellitus with mild nonproliferative retinopathy without macular edema, with long-term current use of insulin, unspecified laterality (H) Albino Rainey MD   leflunomide (ARAVA) 20 MG tablet Take 1 tablet (20 mg) by mouth daily Labs every 8-12 weeks Polyarthralgia Alexus Posey MD   Lidocaine (LIDOCAINE PAIN RELIEF) 4 % Patch APPLY 1 PATCH ONTO SKIN EVERY 24 HOURS ; APPLY AT NIGHT AND REMOVE IN THE MORNING ( TWELVE HOURS PATCH FREE) Cervicalgia Albino Rainey MD   loperamide (IMODIUM A-D) 2 MG tablet Take 1 tablet (2 mg) by mouth 4 times daily as needed for diarrhea Diarrhea, unspecified type Albino Rainey MD   losartan (COZAAR) 100 MG tablet TAKE 1 TABLET BY MOUTH DAILY Coronary artery disease involving native heart with angina pectoris, unspecified vessel or lesion type (H) Albino Rainey MD   magnesium 250 MG tablet Take 1 tablet (250 mg) by mouth At Bedtime Leg Cramps Albino Rainey MD   melatonin 5 MG tablet Take 5 mg by mouth at bedtime  Insomnia Patient Reported   metoprolol  succinate ER (TOPROL XL) 50 MG 24 hr tablet TAKE 1 TABLET BY MOUTH IN THE MORNING AND TAKE 2 TABLETS AT BEDTIME HTN, goal below 140/90 Albino Rainey MD   mirabegron (MYRBETRIQ) 50 MG 24 hr tablet Take 1 tablet (50 mg) by mouth daily Urinary Frequency Gisselle Nash PA-C   naproxen (NAPROSYN) 375 MG tablet Take 1 tablet (375 mg) by mouth 2 times daily as needed for moderate pain (denatl pain) Pain, dental Albino Rainey MD   nystatin (NYAMYC) [id number] UNIT/GM external powder APPLY TOPICALLY TWICE A DAY AS NEEDED Rash and Nonspecific Skin Eruption Albino Rainey MD   ondansetron (ZOFRAN) 4 MG tablet TAKE 1 TABLET BY MOUTH EVERY 8 HOURS AS NEEDED FOR NAUSEA Nausea Albino Rainey MD   order for DME Equipment being ordered: CPAP Supplies. CINDY (Obstructive Sleep Apnea) ART Arias CNP   order for DME Equipment being ordered: Depends. Mixed Incontinence ART Arias CNP   paliperidone ER (INVEGA) 6 MG 24 hr tablet TAKE 2 TABLETS BY MOUTH EVERY NIGHT AT BEDTIME Schizoaffective disorder, unspecified type (H); History of suicidal behavior; Verbal Auditory Hallucination Albino Rainey MD   pantoprazole (PROTONIX) 40 MG EC tablet TAKE 1 TABLET (40 MG) BY MOUTH DAILY Gastroesophageal Reflux Disease without Esophagitis Albino Rainey MD   prazosin (MINIPRESS) 1 MG capsule Take 1 mg by mouth  PTSD (Post-Traumatic Stress Disorder) Patient Reported   QUEtiapine (SEROQUEL) 25 MG tablet TAKE 5 TABLETS BY MOUTH EVERY NIGHT AT BEDTIME Depression with suicidal ideation; PTSD (Post-Traumatic Stress Disorder); Persistent Insomnia Albino Rainey MD   sertraline (ZOLOFT) 50 MG tablet Take 50 mg by mouth daily  Schizoaffective disorder, unspecified type (H); History of suicidal behavior Patient Reported   STIMULANT LAXATIVE 8.6-50 MG tablet TAKE 1 TABLET BY MOUTH DAILY AND EXTRA 1 TABLET AS NEEDED FOR CONSTIPATION Constipation, unspecified  constipation type Albino Rainey MD   traZODone (DESYREL) 100 MG tablet TAKE 1 TABLET BY MOUTH AT BEDTIME Schizoaffective Disorder, Depressive Type (H) Albino Rainey MD   vitamin C (ASCORBIC ACID) 500 MG tablet TAKE 2 TABLETS BY MOUTH IN THE MORNING AND TAKE 2 TABLETS BY MOUTH IN THE EVENING Dysuria Gisselle Nash PA-C   zinc oxide (DESITIN) 20 % external ointment Apply topically 3 times daily as needed for irritation (barrier cream) Skin Irritation ART Arias CNP         Add new medications, over-the-counter drugs, herbals, vitamins, or  minerals in the blank rows below.    Medication How I take it Why I use it Prescriber                                      Allergies:      imidazole antifungals; ketoprofen; lisinopril; metformin; metronidazole; posaconazole        Side effects I have had:               Other Information:              My notes and questions:

## 2024-01-16 NOTE — PROGRESS NOTES
Medication Therapy Management (MTM) Encounter    ASSESSMENT:                            Medication Adherence/Access: No issues identified    Diabetes:   A1c at goal <8%.  Nausea and diarrhea are possibly due to Mounjaro - will discontinue today and monitor.     Hypertension:   Variable readings, asked for continued monitoring at group home and send readings for review at follow-up    Schizoaffective:  Physical exam for lung concerns, see PCP note for details and recommendation to restart albuterol.  Education provided on HFA technique.     PLAN:                            Stop Mounjaro    Start albuterol HFA per PCP    Follow-up: 3 weeks    SUBJECTIVE/OBJECTIVE:                          Nena Tang is a 63 year old female coming in for a transitions of care visit. She was discharged from [facility] on 1/4/24 for sepsis 2/2 aspiration pneumonia. Today's visit is a co-visit with Albino Rainey MD. Patient was accompanied by group home staff and DAVIN Neves called in by phone.      Reason for visit: medication review.    Allergies/ADRs: Reviewed in chart  Past Medical History: Reviewed in chart  Tobacco: She reports that she has never smoked. She has never used smokeless tobacco.  Alcohol: not currently using    Medication Adherence/Access: no issues reported  Patient has assistance with medication administration: group home.    Diabetes   Lantus 22 units twice daily  Humalog sliding scale 8 units + 3u per 50mg/dL starting glucose 151 and max 23u  Mounjaro 5mg weekly - takes weekly on Thursdays  Side effects: nausea, vomiting,diarrhea. Continues to use ondansetron and loperamide PRN. Complains about abdominal discomfort. Has taken 3 doses of loperamide for loose stool in the past week. Last BM yesterday, small amount. Today feeling constipated.  She is happy with weight loss since starting Mounjaro.   Wt Readings from Last 3 Encounters:   01/16/24 242 lb (109.8 kg)   12/12/23 247 lb 8 oz (112.3 kg)   12/11/23 247  "lb (112 kg)     Blood sugar monitoring: Continuous Glucose Monitor (patient reported):  Unchanged from usual, fluctuates 100-200s. Lowest last night with skipping dinner, reports reading in 80s.     Current diabetes symptoms: none  Diet/Exercise: has felt nauseous after large meals       Eye exam is up to date  Foot exam is up to date  Urine Albumin:   Lab Results   Component Value Date    UMALCR 29.10 (H) 12/12/2023      Lab Results   Component Value Date    A1C 7.0 (H) 09/21/2023     Hypertension   Amlodipine 5mg daily  Metoprolol XL 50mg AM and 100mg PM  Losartan 100mg daily  Patient reports no current medication side effects  Patient has blood pressure monitored by group home.  Home BP monitoring 140/83 AM, 149/83 PM, 128/69 AM, 164/83 PM,  169/87 PM, 114/59 AM, 183/95 PM, 139/72 AM, 187/81 PM, 143/73 AM, 169/79 PM, 143/72 AM.    When checking blood pressure in the evening 9-10pm, patient is complaining of back pain typically.      BP Readings from Last 3 Encounters:   12/12/23 (!) 166/80   12/11/23 (!) 148/81   11/08/23 (!) 140/78     Pulse Readings from Last 3 Encounters:   12/12/23 66   12/11/23 66   11/07/23 77     Schizoaffective:  Quetiapine 125mg at bedtime   Paliperidone ER 12mg at bedtime  Sertraline 50mg daily (replaced escitalopram)  Hydroxyzine 25mg in the evening   Trazodone 100mg at bedtime  Clonazepam 0.5mg at bedtime  Amantadine 100mg daily  Reports she continues to have a hard time breathing some nights. O2 has been >95 and no breathing distress noted by staff but symptoms improve with coaching on calming down.    Followed by outside psychiatrist. Recommended for pharmacogenetic testing, waiting for results.      Today's Vitals: LMP 01/06/2015 (Exact Date)   BP Readings from Last 1 Encounters:   01/16/24 (!) 155/81     Pulse Readings from Last 1 Encounters:   01/16/24 72     Wt Readings from Last 1 Encounters:   01/16/24 242 lb (109.8 kg)     Ht Readings from Last 1 Encounters:   01/16/24 5' 1\" " "(1.549 m)     Estimated body mass index is 45.73 kg/m  as calculated from the following:    Height as of an earlier encounter on 1/16/24: 5' 1\" (1.549 m).    Weight as of an earlier encounter on 1/16/24: 242 lb (109.8 kg).    Temp Readings from Last 1 Encounters:   01/16/24 97.9  F (36.6  C) (Temporal)      ----------------  Post Discharge Medication Reconciliation Status: discharge medications reconciled and changed, per note/orders.    I spent 40 minutes with this patient today. All changes were made via verbal agreement with Albino Rainey MD. A copy of the visit note was provided to the patient's provider(s).    A summary of these recommendations was given to the patient (see AVS from today's appointment with Albino Rainey).    Eugenia De Jesus, PharmD, BCACP   Medication Therapy Management Pharmacist   [facility]  [phone]          Medication Therapy Recommendations  Type 2 diabetes mellitus with mild nonproliferative retinopathy (H)    Current Medication: tirzepatide (MOUNJARO) 5 MG/0.5ML pen (Discontinued)   Rationale: Undesirable effect - Adverse medication event - Safety   Recommendation: Discontinue Medication   Status: Accepted per CPA            "

## 2024-01-16 NOTE — LETTER
January 16, 2024  Nena Tang  [address] [place] [address]    Dear Ms. Tang, [facility]     Thank you for talking with me on Jan 16, 2024 about your health and medications. As a follow-up to our conversation, I have included two documents:      Your Recommended To-Do List has steps you should take to get the best results from your medications.  Your Medication List will help you keep track of your medications and how to take them.    If you want to talk about these documents, please call Eugenia De Jesus RPH at phone: [phone], Monday-Friday 8-4:30pm.    I look forward to working with you and your doctors to make sure your medications work well for you.    Sincerely,  Eugenia De Jesus RPH  [facility] Pharmacist, [facility]

## 2024-01-16 NOTE — PROGRESS NOTES
Assessment & Plan     Type 2 diabetes mellitus with stage 3a chronic kidney disease, with long-term current use of insulin (H)  Control remains good at this time.  Hemoglobin A1c checked during her hospitalization was 6.6% so we did not repeat that today.    Aspiration pneumonia of both lungs due to vomit, unspecified part of lung (H)  Unclear if this was the exact cause of her symptoms requiring hospitalization as initial chest imaging including a chest x-ray and CT angiogram to rule out a pulmonary embolism did not demonstrate any infiltrates and it was only later during the hospitalization the bilateral patchy infiltrates were noted.  Her lungs sound good today but she did have some expiratory wheezing as noted below.    Essential hypertension with goal blood pressure less than 130/80  Blood pressures tend to fluctuate at her facility with about 50 to 60% in the normal range, usually when checked in the morning, and those later in the day tend to be elevated.  I have asked them to send a blood pressure log to her next visit on February 6.    Morbid obesity (H)  Diet and exercise were briefly reviewed.  Patient reports 20 pound weight loss on the Mounjaro and would like to continue it but our concern is that it is contributing to the nausea and vomiting as the nursing staff at the facility seem to temporally associate those symptoms with initiation of the Mounjaro therapy.    Shortness of breath  She does have some wheezing but breathing appears comfortable in the clinic today.  She has had an inhaler in the past and so a new 1 was provided today.  - albuterol (PROAIR HFA/PROVENTIL HFA/VENTOLIN HFA) 108 (90 Base) MCG/ACT inhaler; Inhale 2 puffs into the lungs every 6 hours as needed for shortness of breath, wheezing or cough    Nausea and vomiting, unspecified vomiting type  Unclear if the symptoms that led to her hospitalization were an acute gastrointestinal illness, urinary tract infection, or aspiration  pneumonia.  There is a concern that these current symptoms which have continued may be related to the Mounjaro so it is put on temporary hold until her follow-up visit on February 6.    Review of prior external note(s) from [facility] from [facility]  reviewed      MED REC REQUIRED  Post Medication Reconciliation Status: discharge medications reconciled and changed, per note/orders  Depression Screening Follow Up        1/16/2024    10:30 AM   PHQ   PHQ-9 Total Score 23   Q9: Thoughts of better off dead/self-harm past 2 weeks Several days               10/3/2023    11:50 AM   C-SSRS (Brief [place])   Within the last month, have you wished you were dead or wished you could go to sleep and not wake up? Yes   Within the last month, have you had any actual thoughts of killing yourself? Yes   Within the last month, have you been thinking about how you might do this? No   Within the last month, have you had these thoughts and had some intention of acting on them? No   Within the last month, have you started to work out or worked out the details of how to kill yourself with the intent to carry out this plan? No   Within the last month, have you ever done anything, started to do anything, or prepared to do anything to end your life? Yes, lifetime         Follow Up      Follow Up Actions Taken  Crisis resource information provided in the After Visit Summary  Referred patient back to mental health provider    Discussed the following ways the patient can remain in a safe environment:  remove alcohol, remove drugs, remove access to firearms:  , and be around others  FUTURE APPOINTMENTS:       - Follow-up visit in February 6    Surjit Barrett is a 63 year old, presenting for the following health issues:  Forms and Hospital F/U      1/16/2024    10:18 AM   Additional Questions   Roomed by Mary RAMOS   Accompanied by Diaz - Group Home Staff         1/16/2024    10:18 AM   Patient Reported Additional  Medications   Patient reports taking the following new medications Novofine       [facility] Follow-up Visit:    Hospital/Nursing Home/IP Rehab Facility:  [address]  Date of Admission: 12/31/2023  Date of Discharge: 01/04/2024  Reason(s) for Admission: Sepsis ([facility])     Was your hospitalization related to COVID-19? No   Problems taking medications regularly:  None  Medication changes since discharge: None  Problems adhering to non-medication therapy:  None    Summary of hospitalization:  CareEverywhere information obtained and reviewed  Diagnostic Tests/Treatments reviewed.  Follow up needed: none  Other Healthcare Providers Involved in Patient s Care:         None  Update since discharge: stable.         Plan of care communicated with patient and caregiver           Patient is here today for hospital follow-up.  She was admitted to [facility] with nausea and vomiting on December 31.  Initial testing showed evidence of a possible urinary tract infection although this was later felt to be a contaminant, she did grow out less than 10,000 CFU of Klebsiella pneumonia.  She had an initial chest x-ray that was clear as well as a CT angiogram to rule out pulmonary embolism that did not show any evidence of infiltrates but then 1 day later had a chest x-ray with bilateral patchy infiltrates so it was felt that her criteria leading to a diagnosis of sepsis were from an aspiration pneumonia.  Interestingly she had a CT scan of her abdomen and pelvis with contrast that showed an area of hypodensity that was wedge-shaped and one of the kidneys was consistent with possible pyelonephritis.  She was put on ceftriaxone and improved clinically for all related conditions.  The nurse at her facility was on the phone with the visit today and seem to indicate that the patient's symptoms of nausea and vomiting may have coincided with initiation of therapy with Mounjaro.  Patient is reluctant to  "discontinue the Mounjaro because she reports that she is lost 20 pounds while using it.  Diabetes control during her hospital stay was good with a hemoglobin A1c of 6.6% available in the outside records.  Blood pressures at her home have largely been normal in the morning and elevated in the evening with values ranging from 114-183/59-95.  Pain and stress levels seem to be worse in the evening and they were wondering if that might have contributed to the elevated blood pressures.  The facility did reach out to me due to some symptoms of diarrhea.  The best history I can get is that the diarrhea preceded the hospitalization.  I did send over a prescription for Imodium and the nurse estimates they have used that about 4 times since that was sent over.  There is no blood in her stool.  Right now she does have a little cramping in her stomach as well as nausea.  She tends to get more upper abdominal pain if she eats too much since initiation of therapy with the Mounjaro.  Finally, she complains of some feelings of shortness of breath.  This seems to occur multiple times per day despite stable vital signs and she feels that it is more of a problem of getting air in than out.  She has used inhalers in the past and would be open to trying that again to see if it would help with the symptoms.      Review of Systems   Constitutional, HEENT, cardiovascular, pulmonary, gi and gu systems are negative, except as otherwise noted.      Objective    BP (!) 155/81 (BP Location: Right arm, Patient Position: Sitting, Cuff Size: Adult Large)   Pulse 72   Temp 97.9  F (36.6  C) (Temporal)   Resp 13   Ht 1.549 m (5' 1\")   Wt 109.8 kg (242 lb)   LMP 01/06/2015 (Exact Date)   SpO2 95%   BMI 45.73 kg/m    Body mass index is 45.73 kg/m .  Physical Exam   GENERAL: alert and no distress  EYES: Eyes grossly normal to inspection, PERRL and conjunctivae and sclerae normal  NECK: no adenopathy, no asymmetry, masses, or scars  RESP: She has " good air entry bilaterally and does not show any evidence of respiratory distress.  Some faint end expiratory wheezing is noted throughout.  CV: regular rate and rhythm, normal S1 S2, no S3 or S4, no murmur, click or rub, no peripheral edema   MS: no gross musculoskeletal defects noted, no edema  SKIN: no suspicious lesions or rashes  NEURO: Normal strength and tone, mentation intact and speech normal  PSYCH: mentation appears normal, affect normal/bright    Office Visit on 12/12/2023   Component Date Value Ref Range Status    Creatinine Urine mg/dL 12/12/2023 145.0  mg/dL Final    The reference ranges have not been established in urine creatinine. The results should be integrated into the clinical context for interpretation.    Albumin Urine mg/L 12/12/2023 42.2  mg/L Final    The reference ranges have not been established in urine albumin. The results should be integrated into the clinical context for interpretation.    Albumin Urine mg/g Cr 12/12/2023 29.10 (H)  0.00 - 25.00 mg/g Cr Final    Microalbuminuria is defined as an albumin:creatinine ratio of 17 to 299 for males and 25 to 299 for females. A ratio of albumin:creatinine of 300 or higher is indicative of overt proteinuria.  Due to biologic variability, positive results should be confirmed by a second, first-morning random or 24-hour timed urine specimen. If there is discrepancy, a third specimen is recommended. When 2 out of 3 results are in the microalbuminuria range, this is evidence for incipient nephropathy and warrants increased efforts at glucose control, blood pressure control, and institution of therapy with an angiotensin-converting-enzyme (ACE) inhibitor (if the patient can tolerate it).

## 2024-01-17 NOTE — PROGRESS NOTES
Diabetes Consult Note  January 30, 2024          Nena Tang  is a 63 year old female who has a past medical history of Acute respiratory failure with hypoxia (H), CAD (coronary artery disease), Chronic low back pain, Cocaine abuse, in remission (H), Fecal urgency, History of heroin abuse (H), Hyperlipidemia LDL goal <100, Hypertension, Illiterate, Irritable bowel syndrome, Left cataract, Migraine, Migraine headache, Moderate major depression (H), Noncompliance with medication regimen, Obesity, CINDY (obstructive sleep apnea), CINDY (obstructive sleep apnea)- mild AHI 10.3, Osteopenia, Pneumonia of right lower lobe due to infectious organism, Schizoaffective disorder, depressive type (H), Sepsis (H), Suicidal intent, Takotsubo cardiomyopathy, Type 2 diabetes mellitus (H), Uterine cancer (H), and Verbal auditory hallucination. She is here for follow-up of diabetes.            HPI:  Nena was recently hospitalized at [facility] for suicidal ideation September 20 through September 26.  She was followed by the inpatient diabetes service for her type 2 diabetes and was discharged on Lantus 20 units twice daily which was decreased following a steroid taper, NovoLog 1 unit per 7 g of carbohydrate, and NovoLog 1 unit per 25 mg/dL greater than 140 3 times daily and greater than 200 nightly.  She had previously been on Trulicity but this was held while she was hospitalized.    I met Nena in clinic in late October of this year and advised to increase the Lantus from 20 to 22 units daily.  Transition from Trulicity 3 mg weekly to Mounjaro 5 mg weekly.  We have adjusted her sliding scale insulin to 3 units per 25 greater than 140 before meals and greater than 200 at bedtime and work to assure she would get NovoLog well she was at her day program.          Interim: Nena was hospitalized with sepsis at [facility] in December.      Today:  Nena is here today to follow-up.    A1C is 6.2.  Hemoglobin   Date  Value Ref Range Status   12/11/2023 10.8 (L) 11.7 - 15.7 g/dL Final   06/15/2021 11.1 (L) 11.7 - 15.7 g/dL Final   ]    She is here today with [place] from her group home.  Her A1c has improved to 6.2% and she feels very good about this.  She has been taking care to make better food choices with coaching and encouragement from staff at her group home.  She also has been bringing her Humalog pen with her to day programming and making sure she gets a dose with her lunch.      She and staff note that her Mounjaro has been held due to concerns that it may have been causing diarrhea.      We reviewed the chart together note that she did not actually start Mounjaro until early December and was doing well on it when she saw the pharmacist on December 12.  She was then admitted on December 31 and her Mounjaro was continued while she was hospitalized, but discontinued January 16 when she saw her FP.  They stated they would consider resuming it on February 6 but wanted to rule out that it was causing her GI issues.    Asif notes that at times she does go out for fast food with her friend.  She does like French fries but at times does get a salad.  Mom    Radha would like to get back on it as she felt like it was helping her lose weight.  We note that Trulicity had not been helpful in regards to weight loss.    She is excited about her upcoming birthday and will celebrated both at her group home and her son will take her out as well.    Current Diabetes Medications:  Lantus 20 units twice daily  Humalog 8 units per meal  Humalog 3 units per 50 greater than 150 3 times daily AC.    Humalog 3 units per 50 greater than 200 at bedtime.    She also has an order to take 4 units of Humalog with any snack    We reviewed glucometer/CGMS data together.  It revealed:  She has been using Dexcom CGMS.  Her average blood sugar is 153 and her blood sugars 71% time in range without any hypoglycemia.    History of Diabetes monitoring and  complications/ prevention:  CAD: yes  Last eye exam results: She does have retinopathy and sees a retinal specialist.  She has an appointment pending.  Neuropathy: yes  Nephropathy:CKD 3  HTN: yes On metoprolol and Cozaar  On statin: Lipitor 80 mg daily  On ASA:yes  Depression:yes      Nena's PMH, PSH and allergies were reviewed today and pertinent information is summarized above.    Nena's pertinent social and family history are also reviewed today and pertinent information is summarized above.  Also noted is:Lives in group home.  Has lost many family members in recent years.      Current Outpatient Medications   Medication    acetaminophen (TYLENOL) 500 MG tablet    albuterol (PROAIR HFA/PROVENTIL HFA/VENTOLIN HFA) 108 (90 Base) MCG/ACT inhaler    Alcohol Swabs (ALCOHOL PREP) 70 % PADS    alendronate (FOSAMAX) 70 MG tablet    amantadine (SYMMETREL) 100 MG capsule    amLODIPine (NORVASC) 5 MG tablet    aspirin (ASPIRIN LOW DOSE) 81 MG EC tablet    atorvastatin (LIPITOR) 80 MG tablet    blood glucose (NO BRAND SPECIFIED) test strip    calcium carbonate (OS-ALLEN) 1500 (600 Ca) MG tablet    chlorhexidine (PERIDEX) 0.12 % solution    clonazePAM (KLONOPIN) 0.5 MG tablet    Continuous Blood Gluc Sensor (DEXCOM G6 SENSOR) MISC    Continuous Blood Gluc Transmit (DEXCOM G6 TRANSMITTER) MISC    diclofenac (VOLTAREN) 1 % topical gel    gabapentin (NEURONTIN) 300 MG capsule    HUMALOG KWIKPEN 100 UNIT/ML soln    hydrOXYzine (VISTARIL) 25 MG capsule    insulin glargine (LANTUS SOLOSTAR) 100 UNIT/ML pen    insulin pen needle (NOVOFINE AUTOCOVER PEN NEEDLE) 30G X 8 MM miscellaneous    Lancets (ONETOUCH DELICA PLUS [id number]) MISC    leflunomide (ARAVA) 20 MG tablet    Lidocaine (LIDOCAINE PAIN RELIEF) 4 % Patch    loperamide (IMODIUM A-D) 2 MG tablet    losartan (COZAAR) 100 MG tablet    magnesium 250 MG tablet    Magnesium Oxide 250 MG TABS    melatonin 5 MG tablet    metoprolol succinate ER (TOPROL XL) 50 MG 24 hr tablet  "   mirabegron (MYRBETRIQ) 50 MG 24 hr tablet    naproxen (NAPROSYN) 375 MG tablet    nystatin (NYAMYC) 080290 UNIT/GM external powder    ondansetron (ZOFRAN) 4 MG tablet    order for DME    order for DME    paliperidone ER (INVEGA) 6 MG 24 hr tablet    pantoprazole (PROTONIX) 40 MG EC tablet    prazosin (MINIPRESS) 1 MG capsule    QUEtiapine (SEROQUEL) 25 MG tablet    sertraline (ZOLOFT) 50 MG tablet    STIMULANT LAXATIVE 8.6-50 MG tablet    traZODone (DESYREL) 100 MG tablet    vitamin C (ASCORBIC ACID) 500 MG tablet    zinc oxide (DESITIN) 20 % external ointment     Current Facility-Administered Medications   Medication    apraclonidine (IOPIDINE) 1 % ophthalmic solution 1 drop    lidocaine 1 % injection 4 mL    triamcinolone (KENALOG-40) injection 40 mg         ROS:   Reports good physical activity tolerance.  Denies any pedal lesions or vision changes or concerns. Denies any other acute concerns except as noted above.      Exam:    BP (!) 160/84   Pulse 72   Ht 1.575 m (5' 2\")   Wt 106.6 kg (235 lb)   LMP 01/06/2015 (Exact Date)   SpO2 100%   BMI 42.98 kg/m    Wt Readings from Last 10 Encounters:   01/30/24 106.6 kg (235 lb)   01/16/24 109.8 kg (242 lb)   12/12/23 112.3 kg (247 lb 8 oz)   12/11/23 112 kg (247 lb)   11/07/23 113.4 kg (250 lb)   10/03/23 111.5 kg (245 lb 12.8 oz)   09/26/23 111.8 kg (246 lb 8 oz)   09/18/23 110.2 kg (243 lb)   08/07/23 110.2 kg (243 lb)   07/25/23 110.2 kg (243 lb)     Estimated body mass index is 42.98 kg/m  as calculated from the following:    Height as of this encounter: 1.575 m (5' 2\").    Weight as of this encounter: 106.6 kg (235 lb).    General: Pleasant, well nourished and hydrated female in NAD.   Psych:  Mood is \"good,\" affect is appropriate.  Thought form and content are fluid and coherent.    HEENT: Eyes and sclera are clear. Extraocular movements are grossly intact without proptosis.  Nares are patent, mucous membranes moist.  Neck: No masses or JVD are noted.  "   Resp: Easy and unlabored breathing.   Neuro: Alert and oriented, communicating clearly.   Ext: no swelling or edema.  Good distal pulses and capillary refill in digits.  Skin in good condition.  Sensation is intact to monofilament testing bilaterally and throughout.    Data:      Recent Labs   Lab Test 12/12/23  1212 12/11/23  1059 09/26/23  0747 09/23/23  1143 09/21/23  0908 09/20/23  0016 09/19/23  0750 06/25/23  2332 06/20/23  1046 05/19/23  0913 05/10/23  1709 09/27/22  2216 08/17/22  1138   A1C  --   --   --   --  7.0*  --   --   --  6.7*  --   --    < >  --    TSH  --   --   --   --  2.86  --   --   --   --   --  1.60  --   --    LDL  --   --   --   --  81  --  68  --   --   --   --   --   --    HDL  --   --   --   --  68  --  56  --   --   --   --   --   --    TRIG  --   --   --   --  136  --  155*  --   --   --   --   --   --    CR  --  1.06* 1.19*   < >  --  1.30*  --    < > 1.29*   < > 1.44*   < >  --    MICROL 42.2  --   --   --   --   --   --   --   --   --   --   --  11   AST  --  13  --   --   --  13  --    < >  --    < >  --    < >  --    ALT  --  11  --   --   --  26  --    < >  --    < >  --    < >  --     < > = values in this interval not displayed.       Microalbuminuria:  Lab Results   Component Value Date    UMALCR 29.10 (H) 12/12/2023    UMALCR 10.19 08/17/2022       Most recent GFRs:    Lab Results   Component Value Date    GFRESTIMATED 59 (L) 12/11/2023    GFRESTIMATED 51 (L) 09/26/2023    GFRESTIMATED 47 (L) 09/23/2023    GFRESTBLACK >90 06/15/2021    GFRESTBLACK >90 06/15/2021    GFRESTBLACK >90 06/04/2021       Lab Results   Component Value Date    CPEPT 1.4 07/28/2009     FIB-4 Calculation: 0.95 at 9/23/2023 11:43 AM  Calculated from:  SGOT/AST: 13 U/L at 9/20/2023 12:16 AM  SGPT/ALT: 26 U/L at 9/20/2023 12:16 AM  Platelets: 166 10e3/uL at 9/23/2023 11:43 AM  Age: 63 years  AST   Date Value Ref Range Status   12/11/2023 13 0 - 45 U/L Final     Comment:     Reference intervals for this  test were updated on 6/12/2023 to more accurately reflect our healthy population. There may be differences in the flagging of prior results with similar values performed with this method. Interpretation of those prior results can be made in the context of the updated reference intervals.   06/15/2021 12 0 - 45 U/L Final     Lab Results   Component Value Date    ALT 26 09/20/2023    ALT 23 06/15/2021         Most recent eye exam date: : Not Found       Assessment/Plan:    Nena Tang is a 63 year old female with poorly controlled type 2 diabetes.    Type 2 diabetes, well-controlled:  Her blood sugars per both livia CGMS and A1c have markedly improved and that he and her staff are encouraged in this.  For now we will continue both long and short acting insulin as they are.  I do think she will benefit from Mounjaro and do not think that her sepsis or GI issues since hospitalization are likely caused by the Mounjaro.  I do suspect that her FP will resume this on February 6 and if not I am happy to pursue provide a prescription but I do recommend resuming it just 2.5 mg weekly and monitoring closely before further dose increase.    If blood sugars remain at goal in 3 months she will be eligible for graduation from clinic.      2. DM Complications-      Retinopathy:  Yes.  Now up-to-date.  Nephropathy:  Yes.  She does have microalbuminuria as of December.  She is on losartan 100 mg daily.  Neuropathy: Denies difficulties.  Feet: Denies ulcers or lesions.  Foot exam is up-to-date.  Lipids: 10-year atherosclerotic cardiovascular disease risk >20%.  Patient taking a statin.      3. Obesity:  Would like to lose weight.  She is taking more care with her diet and also is working be active at her day programming and home.  She will consider resuming Mounjaro at a low dose with her FP next week.      35 minutes spent on the date of the encounter doing chart review, history and exam, documentation, education and counseling,  as well as communication and coordination of care, and further activities as noted above.  This time includes time spent reviewing BG data.      It is my privilege to be involved in the care of the above patient.     Chanell Duque PA-C, MPAS  [facility]  Diabetes, Endocrinology, and Metabolism  [phone] Appointments/Nurse  [phone] pager  [phone] nurse line    This note was completed in part using Dragon voice recognition, and may contain word and grammatical errors.                                                  1/17/2024  PATIENT LAB/IMAGING STATUS : No pending lab orders   Patient is showing 4/5 MNCM met. BP out range   Outcome for 01/17/24 12:01 PM: Gear6 message sent  Maria Del Carmen Mg CMA
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
## 2024-01-22 DIAGNOSIS — R21 RASH AND NONSPECIFIC SKIN ERUPTION: ICD-10-CM

## 2024-01-22 RX ORDER — NYSTATIN 100000 [USP'U]/G
POWDER TOPICAL
Qty: 60 G | OUTPATIENT
Start: 2024-01-22

## 2024-01-28 ENCOUNTER — HEALTH MAINTENANCE LETTER (OUTPATIENT)
Age: 64
End: 2024-01-28

## 2024-01-30 ENCOUNTER — OFFICE VISIT (OUTPATIENT)
Dept: ENDOCRINOLOGY | Facility: CLINIC | Age: 64
End: 2024-01-30
Payer: COMMERCIAL

## 2024-01-30 VITALS
OXYGEN SATURATION: 100 % | HEIGHT: 62 IN | BODY MASS INDEX: 43.24 KG/M2 | DIASTOLIC BLOOD PRESSURE: 84 MMHG | WEIGHT: 235 LBS | SYSTOLIC BLOOD PRESSURE: 160 MMHG | HEART RATE: 72 BPM

## 2024-01-30 DIAGNOSIS — K59.00 CONSTIPATION, UNSPECIFIED CONSTIPATION TYPE: Primary | ICD-10-CM

## 2024-01-30 DIAGNOSIS — Z79.4 TYPE 2 DIABETES MELLITUS WITH STAGE 3 CHRONIC KIDNEY DISEASE, WITH LONG-TERM CURRENT USE OF INSULIN, UNSPECIFIED WHETHER STAGE 3A OR 3B CKD (H): Primary | ICD-10-CM

## 2024-01-30 DIAGNOSIS — E11.22 TYPE 2 DIABETES MELLITUS WITH STAGE 3 CHRONIC KIDNEY DISEASE, WITH LONG-TERM CURRENT USE OF INSULIN, UNSPECIFIED WHETHER STAGE 3A OR 3B CKD (H): Primary | ICD-10-CM

## 2024-01-30 DIAGNOSIS — N18.30 TYPE 2 DIABETES MELLITUS WITH STAGE 3 CHRONIC KIDNEY DISEASE, WITH LONG-TERM CURRENT USE OF INSULIN, UNSPECIFIED WHETHER STAGE 3A OR 3B CKD (H): Primary | ICD-10-CM

## 2024-01-30 LAB — HBA1C MFR BLD: 6.2 %

## 2024-01-30 PROCEDURE — 99214 OFFICE O/P EST MOD 30 MIN: CPT | Performed by: PHYSICIAN ASSISTANT

## 2024-01-30 PROCEDURE — 83036 HEMOGLOBIN GLYCOSYLATED A1C: CPT | Performed by: PATHOLOGY

## 2024-01-30 ASSESSMENT — PAIN SCALES - GENERAL: PAINLEVEL: SEVERE PAIN (7)

## 2024-01-30 NOTE — PATIENT INSTRUCTIONS
Dear Radha,  I think that your recent GI illness is probably not related to Mounjaro, but if you resume Mounjaro, start with just 2.5 mg weekly and monitor closely.  I can provide prescription if needed.      Continue other mediations as ordered.    Congratulations to all of you on your great work!    My best wishes,    Chanell Duque PA-C, MPAS  [facility]  Diabetes, Endocrinology, and Metabolism  [phone] Appointments/Nurse  [phone] Fax

## 2024-01-30 NOTE — LETTER
1/30/2024       RE: Nena Tang  [address] [place] [address]     Dear Colleague,    Thank you for referring your patient, Nena Tang, to the [facility] [place] at [facility]. Please see a copy of my visit note below.                   Diabetes Consult Note  January 30, 2024          Nena Tang  is a 63 year old female who has a past medical history of Acute respiratory failure with hypoxia (H), CAD (coronary artery disease), Chronic low back pain, Cocaine abuse, in remission (H), Fecal urgency, History of heroin abuse (H), Hyperlipidemia LDL goal <100, Hypertension, Illiterate, Irritable bowel syndrome, Left cataract, Migraine, Migraine headache, Moderate major depression (H), Noncompliance with medication regimen, Obesity, CINDY (obstructive sleep apnea), CINDY (obstructive sleep apnea)- mild AHI 10.3, Osteopenia, Pneumonia of right lower lobe due to infectious organism, Schizoaffective disorder, depressive type (H), Sepsis (H), Suicidal intent, Takotsubo cardiomyopathy, Type 2 diabetes mellitus (H), Uterine cancer (H), and Verbal auditory hallucination. She is here for follow-up of diabetes.            HPI:  Nena was recently hospitalized at [facility] for suicidal ideation September 20 through September 26.  She was followed by the inpatient diabetes service for her type 2 diabetes and was discharged on Lantus 20 units twice daily which was decreased following a steroid taper, NovoLog 1 unit per 7 g of carbohydrate, and NovoLog 1 unit per 25 mg/dL greater than 140 3 times daily and greater than 200 nightly.  She had previously been on Trulicity but this was held while she was hospitalized.    I met Nena in clinic in late October of this year and advised to increase the Lantus from 20 to 22 units daily.  Transition from Trulicity 3 mg weekly to Mounjaro 5 mg weekly.  We have adjusted her sliding scale  insulin to 3 units per 25 greater than 140 before meals and greater than 200 at bedtime and work to assure she would get NovoLog well she was at her day program.          Interim: Nena was hospitalized with sepsis at [facility] in December.      Today:  Nena is here today to follow-up.    A1C is 6.2.  Hemoglobin   Date Value Ref Range Status   12/11/2023 10.8 (L) 11.7 - 15.7 g/dL Final   06/15/2021 11.1 (L) 11.7 - 15.7 g/dL Final   ]    She is here today with Asif from her group home.  Her A1c has improved to 6.2% and she feels very good about this.  She has been taking care to make better food choices with coaching and encouragement from staff at her group home.  She also has been bringing her Humalog pen with her to day programming and making sure she gets a dose with her lunch.      She and staff note that her Mounjaro has been held due to concerns that it may have been causing diarrhea.      We reviewed the chart together note that she did not actually start Mounjaro until early December and was doing well on it when she saw the pharmacist on December 12.  She was then admitted on December 31 and her Mounjaro was continued while she was hospitalized, but discontinued January 16 when she saw her FP.  They stated they would consider resuming it on February 6 but wanted to rule out that it was causing her GI issues.    Asif notes that at times she does go out for fast food with her friend.  She does like French fries but at times does get a salad.  Mom    Radha would like to get back on it as she felt like it was helping her lose weight.  We note that Trulicity had not been helpful in regards to weight loss.    She is excited about her upcoming birthday and will celebrated both at her group home and her son will take her out as well.    Current Diabetes Medications:  Lantus 20 units twice daily  Humalog 8 units per meal  Humalog 3 units per 50 greater than 150 3 times daily AC.    Humalog 3 units  per 50 greater than 200 at bedtime.    She also has an order to take 4 units of Humalog with any snack    We reviewed glucometer/CGMS data together.  It revealed:  She has been using Dexcom CGMS.  Her average blood sugar is 153 and her blood sugars 71% time in range without any hypoglycemia.    History of Diabetes monitoring and complications/ prevention:  CAD: yes  Last eye exam results: She does have retinopathy and sees a retinal specialist.  She has an appointment pending.  Neuropathy: yes  Nephropathy:CKD 3  HTN: yes On metoprolol and Cozaar  On statin: Lipitor 80 mg daily  On ASA:yes  Depression:yes      Nena's PMH, PSH and allergies were reviewed today and pertinent information is summarized above.    Nena's pertinent social and family history are also reviewed today and pertinent information is summarized above.  Also noted is:Lives in group home.  Has lost many family members in recent years.      Current Outpatient Medications   Medication    acetaminophen (TYLENOL) 500 MG tablet    albuterol (PROAIR HFA/PROVENTIL HFA/VENTOLIN HFA) 108 (90 Base) MCG/ACT inhaler    Alcohol Swabs (ALCOHOL PREP) 70 % PADS    alendronate (FOSAMAX) 70 MG tablet    amantadine (SYMMETREL) 100 MG capsule    amLODIPine (NORVASC) 5 MG tablet    aspirin (ASPIRIN LOW DOSE) 81 MG EC tablet    atorvastatin (LIPITOR) 80 MG tablet    blood glucose (NO BRAND SPECIFIED) test strip    calcium carbonate (OS-ALLEN) 1500 (600 Ca) MG tablet    chlorhexidine (PERIDEX) 0.12 % solution    clonazePAM (KLONOPIN) 0.5 MG tablet    Continuous Blood Gluc Sensor (DEXCOM G6 SENSOR) MISC    Continuous Blood Gluc Transmit (DEXCOM G6 TRANSMITTER) MISC    diclofenac (VOLTAREN) 1 % topical gel    gabapentin (NEURONTIN) 300 MG capsule    HUMALOG KWIKPEN 100 UNIT/ML soln    hydrOXYzine (VISTARIL) 25 MG capsule    insulin glargine (LANTUS SOLOSTAR) 100 UNIT/ML pen    insulin pen needle (NOVOFINE AUTOCOVER PEN NEEDLE) 30G X 8 MM miscellaneous    Lancets  "(ONETOUCH DELICA PLUS [id number]) MISC    leflunomide (ARAVA) 20 MG tablet    Lidocaine (LIDOCAINE PAIN RELIEF) 4 % Patch    loperamide (IMODIUM A-D) 2 MG tablet    losartan (COZAAR) 100 MG tablet    magnesium 250 MG tablet    Magnesium Oxide 250 MG TABS    melatonin 5 MG tablet    metoprolol succinate ER (TOPROL XL) 50 MG 24 hr tablet    mirabegron (MYRBETRIQ) 50 MG 24 hr tablet    naproxen (NAPROSYN) 375 MG tablet    nystatin (NYAMYC) 053103 UNIT/GM external powder    ondansetron (ZOFRAN) 4 MG tablet    order for DME    order for DME    paliperidone ER (INVEGA) 6 MG 24 hr tablet    pantoprazole (PROTONIX) 40 MG EC tablet    prazosin (MINIPRESS) 1 MG capsule    QUEtiapine (SEROQUEL) 25 MG tablet    sertraline (ZOLOFT) 50 MG tablet    STIMULANT LAXATIVE 8.6-50 MG tablet    traZODone (DESYREL) 100 MG tablet    vitamin C (ASCORBIC ACID) 500 MG tablet    zinc oxide (DESITIN) 20 % external ointment     Current Facility-Administered Medications   Medication    apraclonidine (IOPIDINE) 1 % ophthalmic solution 1 drop    lidocaine 1 % injection 4 mL    triamcinolone (KENALOG-40) injection 40 mg         ROS:   Reports good physical activity tolerance.  Denies any pedal lesions or vision changes or concerns. Denies any other acute concerns except as noted above.      Exam:    BP (!) 160/84   Pulse 72   Ht 1.575 m (5' 2\")   Wt 106.6 kg (235 lb)   LMP 01/06/2015 (Exact Date)   SpO2 100%   BMI 42.98 kg/m    Wt Readings from Last 10 Encounters:   01/30/24 106.6 kg (235 lb)   01/16/24 109.8 kg (242 lb)   12/12/23 112.3 kg (247 lb 8 oz)   12/11/23 112 kg (247 lb)   11/07/23 113.4 kg (250 lb)   10/03/23 111.5 kg (245 lb 12.8 oz)   09/26/23 111.8 kg (246 lb 8 oz)   09/18/23 110.2 kg (243 lb)   08/07/23 110.2 kg (243 lb)   07/25/23 110.2 kg (243 lb)     Estimated body mass index is 42.98 kg/m  as calculated from the following:    Height as of this encounter: 1.575 m (5' 2\").    Weight as of this encounter: 106.6 kg (235 " "lb).    General: Pleasant, well nourished and hydrated female in NAD.   Psych:  Mood is \"good,\" affect is appropriate.  Thought form and content are fluid and coherent.    HEENT: Eyes and sclera are clear. Extraocular movements are grossly intact without proptosis.  Nares are patent, mucous membranes moist.  Neck: No masses or JVD are noted.    Resp: Easy and unlabored breathing.   Neuro: Alert and oriented, communicating clearly.   Ext: no swelling or edema.  Good distal pulses and capillary refill in digits.  Skin in good condition.  Sensation is intact to monofilament testing bilaterally and throughout.    Data:      Recent Labs   Lab Test 12/12/23  1212 12/11/23  1059 09/26/23  0747 09/23/23  1143 09/21/23  0908 09/20/23  0016 09/19/23  0750 06/25/23  2332 06/20/23  1046 05/19/23  0913 05/10/23  1709 09/27/22  2216 08/17/22  1138   A1C  --   --   --   --  7.0*  --   --   --  6.7*  --   --    < >  --    TSH  --   --   --   --  2.86  --   --   --   --   --  1.60  --   --    LDL  --   --   --   --  81  --  68  --   --   --   --   --   --    HDL  --   --   --   --  68  --  56  --   --   --   --   --   --    TRIG  --   --   --   --  136  --  155*  --   --   --   --   --   --    CR  --  1.06* 1.19*   < >  --  1.30*  --    < > 1.29*   < > 1.44*   < >  --    MICROL 42.2  --   --   --   --   --   --   --   --   --   --   --  11   AST  --  13  --   --   --  13  --    < >  --    < >  --    < >  --    ALT  --  11  --   --   --  26  --    < >  --    < >  --    < >  --     < > = values in this interval not displayed.       Microalbuminuria:  Lab Results   Component Value Date    UMALCR 29.10 (H) 12/12/2023    UMALCR 10.19 08/17/2022       Most recent GFRs:    Lab Results   Component Value Date    GFRESTIMATED 59 (L) 12/11/2023    GFRESTIMATED 51 (L) 09/26/2023    GFRESTIMATED 47 (L) 09/23/2023    GFRESTBLACK >90 06/15/2021    GFRESTBLACK >90 06/15/2021    GFRESTBLACK >90 06/04/2021       Lab Results   Component Value Date    " CPEPT 1.4 07/28/2009     FIB-4 Calculation: 0.95 at 9/23/2023 11:43 AM  Calculated from:  SGOT/AST: 13 U/L at 9/20/2023 12:16 AM  SGPT/ALT: 26 U/L at 9/20/2023 12:16 AM  Platelets: 166 10e3/uL at 9/23/2023 11:43 AM  Age: 63 years  AST   Date Value Ref Range Status   12/11/2023 13 0 - 45 U/L Final     Comment:     Reference intervals for this test were updated on 6/12/2023 to more accurately reflect our healthy population. There may be differences in the flagging of prior results with similar values performed with this method. Interpretation of those prior results can be made in the context of the updated reference intervals.   06/15/2021 12 0 - 45 U/L Final     Lab Results   Component Value Date    ALT 26 09/20/2023    ALT 23 06/15/2021         Most recent eye exam date: : Not Found       Assessment/Plan:    Nena Tang is a 63 year old female with poorly controlled type 2 diabetes.    Type 2 diabetes, well-controlled:  Her blood sugars per both livia CGMS and A1c have markedly improved and that he and her staff are encouraged in this.  For now we will continue both long and short acting insulin as they are.  I do think she will benefit from Mounjaro and do not think that her sepsis or GI issues since hospitalization are likely caused by the Mounjaro.  I do suspect that her FP will resume this on February 6 and if not I am happy to pursue provide a prescription but I do recommend resuming it just 2.5 mg weekly and monitoring closely before further dose increase.    If blood sugars remain at goal in 3 months she will be eligible for graduation from clinic.      2. DM Complications-      Retinopathy:  Yes.  Now up-to-date.  Nephropathy:  Yes.  She does have microalbuminuria as of December.  She is on losartan 100 mg daily.  Neuropathy: Denies difficulties.  Feet: Denies ulcers or lesions.  Foot exam is up-to-date.  Lipids: 10-year atherosclerotic cardiovascular disease risk >20%.  Patient taking a  statin.      3. Obesity:  Would like to lose weight.  She is taking more care with her diet and also is working be active at her day programming and home.  She will consider resuming Mounjaro at a low dose with her FP next week.      35 minutes spent on the date of the encounter doing chart review, history and exam, documentation, education and counseling, as well as communication and coordination of care, and further activities as noted above.  This time includes time spent reviewing BG data.      It is my privilege to be involved in the care of the above patient.     Chanell Duque PA-C, MPAS  [facility]  Diabetes, Endocrinology, and Metabolism  [phone] Appointments/Nurse  [phone] pager  [phone] nurse line    This note was completed in part using Dragon voice recognition, and may contain word and grammatical errors.                                                  1/17/2024  PATIENT LAB/IMAGING STATUS : No pending lab orders   Patient is showing 4/5 MNCM met. BP out range   Outcome for 01/17/24 12:01 PM: Setgot message sent  Maria Del Carmen Mg CMA

## 2024-02-06 ENCOUNTER — OFFICE VISIT (OUTPATIENT)
Dept: PHARMACY | Facility: CLINIC | Age: 64
End: 2024-02-06
Payer: COMMERCIAL

## 2024-02-06 ENCOUNTER — OFFICE VISIT (OUTPATIENT)
Dept: FAMILY MEDICINE | Facility: CLINIC | Age: 64
End: 2024-02-06
Attending: FAMILY MEDICINE
Payer: COMMERCIAL

## 2024-02-06 VITALS
SYSTOLIC BLOOD PRESSURE: 156 MMHG | WEIGHT: 236 LBS | BODY MASS INDEX: 44.56 KG/M2 | RESPIRATION RATE: 17 BRPM | HEIGHT: 61 IN | TEMPERATURE: 97.1 F | HEART RATE: 71 BPM | OXYGEN SATURATION: 95 % | DIASTOLIC BLOOD PRESSURE: 82 MMHG

## 2024-02-06 DIAGNOSIS — Z79.4 TYPE 2 DIABETES MELLITUS WITH MILD NONPROLIFERATIVE RETINOPATHY WITHOUT MACULAR EDEMA, WITH LONG-TERM CURRENT USE OF INSULIN, UNSPECIFIED LATERALITY (H): Primary | ICD-10-CM

## 2024-02-06 DIAGNOSIS — E11.22 TYPE 2 DIABETES MELLITUS WITH STAGE 3A CHRONIC KIDNEY DISEASE, WITH LONG-TERM CURRENT USE OF INSULIN (H): Primary | ICD-10-CM

## 2024-02-06 DIAGNOSIS — I10 HTN, GOAL BELOW 140/90: ICD-10-CM

## 2024-02-06 DIAGNOSIS — I10 ESSENTIAL HYPERTENSION WITH GOAL BLOOD PRESSURE LESS THAN 130/80: ICD-10-CM

## 2024-02-06 DIAGNOSIS — N18.31 TYPE 2 DIABETES MELLITUS WITH STAGE 3A CHRONIC KIDNEY DISEASE, WITH LONG-TERM CURRENT USE OF INSULIN (H): Primary | ICD-10-CM

## 2024-02-06 DIAGNOSIS — E11.3299 TYPE 2 DIABETES MELLITUS WITH MILD NONPROLIFERATIVE RETINOPATHY WITHOUT MACULAR EDEMA, WITH LONG-TERM CURRENT USE OF INSULIN, UNSPECIFIED LATERALITY (H): Primary | ICD-10-CM

## 2024-02-06 DIAGNOSIS — F25.1 SCHIZOAFFECTIVE DISORDER, DEPRESSIVE TYPE (H): ICD-10-CM

## 2024-02-06 DIAGNOSIS — Z79.4 TYPE 2 DIABETES MELLITUS WITH STAGE 3A CHRONIC KIDNEY DISEASE, WITH LONG-TERM CURRENT USE OF INSULIN (H): Primary | ICD-10-CM

## 2024-02-06 PROCEDURE — 99607 MTMS BY PHARM ADDL 15 MIN: CPT | Performed by: PHARMACIST

## 2024-02-06 PROCEDURE — 99214 OFFICE O/P EST MOD 30 MIN: CPT | Performed by: FAMILY MEDICINE

## 2024-02-06 PROCEDURE — 99606 MTMS BY PHARM EST 15 MIN: CPT | Performed by: PHARMACIST

## 2024-02-06 RX ORDER — TIRZEPATIDE 2.5 MG/.5ML
2.5 INJECTION, SOLUTION SUBCUTANEOUS
Qty: 2 ML | Refills: 11 | Status: SHIPPED | OUTPATIENT
Start: 2024-02-06 | End: 2024-04-09

## 2024-02-06 RX ORDER — AMLODIPINE BESYLATE 10 MG/1
10 TABLET ORAL EVERY MORNING
Qty: 30 TABLET | Refills: 11 | Status: SHIPPED | OUTPATIENT
Start: 2024-02-06

## 2024-02-06 ASSESSMENT — PAIN SCALES - GENERAL: PAINLEVEL: SEVERE PAIN (7)

## 2024-02-06 NOTE — PROGRESS NOTES
Assessment & Plan     Type 2 diabetes mellitus with stage 3a chronic kidney disease, with long-term current use of insulin (H)  Diabetes control was really good at her last visit.  It seems as best as we can ascertain that the nausea and vomiting have resolved.  The patient would like to go back on Mounjaro so we will restart it at half the previous dose, schedule follow-up in about 5 weeks, and monitor closely for recurrence of any GI side effects.  - tirzepatide (MOUNJARO) 2.5 MG/0.5ML pen; Inject 2.5 mg Subcutaneous every 7 days    Essential hypertension with goal blood pressure less than 130/80  Blood pressure logs from the facility continue to show elevation in the late afternoon.  May be dealing with more discomfort at that time of day.  Will increase amlodipine to 10 mg/day and follow-up in about 5 months.  - amLODIPine (NORVASC) 10 MG tablet; Take 1 tablet (10 mg) by mouth every morning          Blood sugar testing frequency justification:  Adjustment of medication(s)    FUTURE APPOINTMENTS:       - Follow-up visit in March 5    Surjit Barrett is a 64 year old, presenting for the following health issues:  Diabetes and Pain      2/6/2024    10:18 AM   Additional Questions   Roomed by Mary RAMOS   Accompanied by Zackary - Caregiver         2/6/2024    10:18 AM   Patient Reported Additional Medications   Patient reports taking the following new medications Novofine     Pain    History of Present Illness       Diabetes:   She presents for follow up of diabetes.  She is checking home blood glucose four or more times daily.   She checks blood glucose after meals.  Blood glucose is sometimes over 200 and never under 70. She is aware of hypoglycemia symptoms including dizziness.    She has no concerns regarding her diabetes at this time.   She is not experiencing numbness or burning in feet, excessive thirst, blurry vision, weight changes or redness, sores or blisters on feet.           Reason for visit:   "Diabetes and Pain Follow Up    She eats 2-3 servings of fruits and vegetables daily.She consumes 0 sweetened beverage(s) daily.   She is taking medications regularly.           Patient is here today for follow-up.  Her GI symptoms have improved.  Psychiatry has not made any medication changes.  Blood pressures remain elevated in the evenings with systolic values between 150-160 while morning values are 120-140.  She complains of some back pain but says it is nothing out of the ordinary and kind of usual discomfort.  No other concerns were noted today.    Overall the patient feels she is doing really well at this time and is happy with her blood sugar control.      Review of Systems  Constitutional, HEENT, cardiovascular, pulmonary, gi and gu systems are negative, except as otherwise noted.      Objective    BP (!) 156/82 (BP Location: Right arm, Patient Position: Sitting, Cuff Size: Adult Large)   Pulse 71   Temp 97.1  F (36.2  C) (Temporal)   Resp 17   Ht 1.547 m (5' 0.91\")   Wt 107 kg (236 lb)   LMP 01/06/2015 (Exact Date)   SpO2 95%   BMI 44.73 kg/m    Body mass index is 44.73 kg/m .  Physical Exam   GENERAL: alert and no distress  EYES: Eyes grossly normal to inspection, PERRL and conjunctivae and sclerae normal  MS: no gross musculoskeletal defects noted, no edema  NEURO: Normal strength and tone, mentation intact and speech normal  PSYCH: mentation appears normal, affect normal/bright    Office Visit on 01/30/2024   Component Date Value Ref Range Status    Afinion Hemoglobin A1c POCT 01/30/2024 6.2 (H)  <=5.7 % Final    Normal <5.7%   Prediabetes 5.7-6.4%     Diabetes 6.5% or higher       Note: Adopted from ADA consensus guidelines.           Signed Electronically by: Albino Rainey MD    "

## 2024-02-06 NOTE — PROGRESS NOTES
Medication Therapy Management (MTM) Encounter    ASSESSMENT:                            Medication Adherence/Access: No issues identified    Diabetes:   Patient is meeting A1c goal of < 8%.  Patient is not meeting goal of > 50% time in target with continuous glucose monitoring.   Patient would benefit from restart Mounjaro at lower dose 2.5mg weekly. Encouraged eating smaller portion sizes to minimize nausea.   Will titrate as able at least to 5mg dose (lowest effective dose)    Hypertension:   Blood pressures remain elevated, increase amlodipine. Consider replacing amlodipine with chlorthalidone if this is ineffective.    Schizoaffective: stable. Encouraged bringing pharmacogenetic test results into clinic for review at next visit.    PLAN:                            Restart Mounjaro at 2.5mg every 7 days    Increase amlodipine to 10mg daily    Follow-up: 1 month    SUBJECTIVE/OBJECTIVE:                          Nena Tang is a 64 year old female coming in for a follow-up visit from 1/16/24. Today's visit is a co-visit with Albino Rainey MD. Patient was accompanied by group home staff.      Reason for visit: recheck.    Allergies/ADRs: Reviewed in chart  Past Medical History: Reviewed in chart  Tobacco: She reports that she has never smoked. She has never used smokeless tobacco.  Alcohol: not currently using    Medication Adherence/Access: no issues reported  Patient has assistance with medication administration: group home.    Diabetes   Lantus 22 units twice daily  Humalog sliding scale 8 units + 3u per 50mg/dL starting glucose 151 and max 23u  Mounjaro 5mg weekly - on hold since 1/16  Side effects: none. Nausea, vomiting, diarrhea has resolved. She would like to restart Mounjaro, had been helpful for weight loss.    Blood sugar monitoring: Continuous Glucose Monitor (Per Dexcom Clarity)        Current diabetes symptoms: none  Diet/Exercise: eating out on occasion, spikes her blood sugars. Staff  supports what Novolog dose to take, speaks to her on the phone to help with dosing.          Eye exam is up to date  Foot exam is up to date  Urine Albumin:   Lab Results   Component Value Date    UMALCR 29.10 (H) 12/12/2023      Lab Results   Component Value Date    A1C 7.0 (H) 09/21/2023 1/30/24 point of care A1c 6.2    Hypertension   Amlodipine 5mg daily  Metoprolol XL 50mg AM and 100mg PM (started in 2009 when diagnosed with CAD)  Losartan 100mg daily  Patient reports no current medication side effects  Patient has blood pressure monitored by group home.  Home BP monitoring 137/84, 164/92, 127/74, 154/81, 140/75, 159/72,158/88, 140/80, 162/93, 154/84, 127/81, 165/90, 125/67    When checking blood pressure in the evening 9-10pm, patient is complaining of back pain typically.   Medication history:   amlodipine - stopped due to controlled blood pressure, has since been restarted.    Chlorthalidone - stopped while inpatient, not restarted since blood pressure was well controlled.       BP Readings from Last 3 Encounters:   01/30/24 (!) 160/84   01/16/24 (!) 155/81   12/12/23 (!) 166/80     Pulse Readings from Last 3 Encounters:   01/30/24 72   01/16/24 72   12/12/23 66     Schizoaffective:  Quetiapine 125mg at bedtime   Paliperidone ER 12mg at bedtime  Sertraline 50mg daily (replaced escitalopram)  Hydroxyzine 25mg in the evening   Trazodone 100mg at bedtime  Clonazepam 0.5mg at bedtime  Amantadine 100mg daily  She is doing well, no concerns.   Side effects: continues to ask about which medications could be making her leg bounce. Sometimes she is bouncing her leg and sometimes still during the visit. States she also feels anxious at times. Leg bouncing has occurred for many years, can see notes dating back to 2015 regarding this symptom.   Followed by outside psychiatrist. Recommended for pharmacogenetic testing, did not bring in results. States psychiatry did not change any medication.       Today's Vitals: LMP  "01/06/2015 (Exact Date)   BP Readings from Last 1 Encounters:   02/06/24 (!) 156/82     Pulse Readings from Last 1 Encounters:   02/06/24 71     Wt Readings from Last 1 Encounters:   02/06/24 236 lb (107 kg)     Ht Readings from Last 1 Encounters:   02/06/24 5' 0.91\" (1.547 m)     Estimated body mass index is 44.73 kg/m  as calculated from the following:    Height as of an earlier encounter on 2/6/24: 5' 0.91\" (1.547 m).    Weight as of an earlier encounter on 2/6/24: 236 lb (107 kg).    Temp Readings from Last 1 Encounters:   02/06/24 97.1  F (36.2  C) (Temporal)      ----------------      I spent 30 minutes with this patient today. All changes were made via collaborative practice agreement with Albino Rainey MD. A copy of the visit note was provided to the patient's provider(s).    A summary of these recommendations was given to the patient (see AVS from today's appointment with PCP).    Eugenia De Jesus, PharmD, BCACP   Medication Therapy Management Pharmacist   [facility]  [phone]          Medication Therapy Recommendations  Essential hypertension with goal blood pressure less than 130/80    Current Medication: amLODIPine (NORVASC) 5 MG tablet (Discontinued)   Rationale: Dose too low - Dosage too low - Effectiveness   Recommendation: Increase Dose   Status: Accepted per Provider         Type 2 diabetes mellitus with mild nonproliferative retinopathy (H)    Current Medication: insulin glargine (LANTUS SOLOSTAR) 100 UNIT/ML pen   Rationale: Synergistic therapy - Needs additional medication therapy - Indication   Recommendation: Start Medication - Mounjaro 2.5 MG/0.5ML Sopn   Status: Accepted per Provider            "

## 2024-02-20 DIAGNOSIS — E11.3299 TYPE 2 DIABETES MELLITUS WITH MILD NONPROLIFERATIVE RETINOPATHY WITHOUT MACULAR EDEMA, WITH LONG-TERM CURRENT USE OF INSULIN, UNSPECIFIED LATERALITY (H): ICD-10-CM

## 2024-02-20 DIAGNOSIS — Z79.4 TYPE 2 DIABETES MELLITUS WITH MILD NONPROLIFERATIVE RETINOPATHY WITHOUT MACULAR EDEMA, WITH LONG-TERM CURRENT USE OF INSULIN, UNSPECIFIED LATERALITY (H): ICD-10-CM

## 2024-02-20 RX ORDER — PROCHLORPERAZINE 25 MG/1
SUPPOSITORY RECTAL
Qty: 1 EACH | Refills: 1 | Status: SHIPPED | OUTPATIENT
Start: 2024-02-20 | End: 2024-08-02

## 2024-02-27 ENCOUNTER — TELEPHONE (OUTPATIENT)
Dept: FAMILY MEDICINE | Facility: CLINIC | Age: 64
End: 2024-02-27
Payer: COMMERCIAL

## 2024-02-27 NOTE — TELEPHONE ENCOUNTER
Reason for Call:  Form, our goal is to have forms completed with 72 hours, however, some forms may require a visit or additional information.    Type of letter, form or note:   [facility]    Who is the form from?:  [facility] (if other please explain)    Where did the form come from: form was faxed in    What clinic location was the form placed at?: [facility]    Where the form was placed:  DOU[facility] Box/Folder    What number is listed as a contact on the form?:   F [phone]         Additional comments:     Call taken on 2/27/2024 at 1:32 PM by Cheryl Beltran;

## 2024-02-28 DIAGNOSIS — M25.50 POLYARTHRALGIA: ICD-10-CM

## 2024-02-28 RX ORDER — LEFLUNOMIDE 20 MG/1
20 TABLET ORAL DAILY
Qty: 90 TABLET | Refills: 1 | Status: SHIPPED | OUTPATIENT
Start: 2024-02-28 | End: 2024-08-13

## 2024-02-28 NOTE — TELEPHONE ENCOUNTER
Requested Prescriptions   Pending Prescriptions Disp Refills    leflunomide (ARAVA) 20 MG tablet 90 tablet 0     Sig: Take 1 tablet (20 mg) by mouth daily Labs every 8-12 weeks       There is no refill protocol information for this order         Last Written Prescription Date:  12/13/2023  Last Fill Quantity: 90 tablet,  # refills: 0   Last office visit: Visit date not found ; last virtual visit: Visit date not found with prescribing provider:  Alexus Posey MD  Future Office Visit: 04/02/2024      Next 5 appointments (look out 90 days)      Mar 12, 2024 10:30 AM  Pharmacist Visit with Eugenia De Jesus [facility] ([facility] ) [address] [place] [address]  [facility] [id number] [phone]     Apr 02, 2024 11:00 AM  (Arrive by 10:45 AM)  Return Visit with Alexus Posey MD  [facility] ([facility] ) [address] [place] [address]  [facility] [id number] [phone]

## 2024-02-28 NOTE — TELEPHONE ENCOUNTER
Last Written Prescription Date:  12/13/23  Last Fill Quantity: 90,  # refills: 0   Last office visit: Visit date not found ; last virtual visit: Visit date not found with prescribing provider:  Kalpesh   Future Office Visit:   Next 5 appointments (look out 90 days)      Mar 12, 2024 10:30 AM  Pharmacist Visit with Eugenia De Jesus RPH  [facility] ([facility] ) [address] [place] [address] 602  [facility] [id number] [phone]     Apr 02, 2024 11:00 AM  (Arrive by 10:45 AM)  Return Visit with Alexus Posey MD  [facility] ([facility] ) [address] [place] [address] 215  [facility] [id number] [phone]             Last labs 1/1/24 thru [facility]    Requested Prescriptions   Pending Prescriptions Disp Refills    leflunomide (ARAVA) 20 MG tablet 90 tablet 1     Sig: Take 1 tablet (20 mg) by mouth daily Labs every 8-12 weeks       There is no refill protocol information for this order        Isabell Casas RN

## 2024-02-29 DIAGNOSIS — R21 RASH AND NONSPECIFIC SKIN ERUPTION: ICD-10-CM

## 2024-02-29 RX ORDER — NYSTATIN 100000 [USP'U]/G
POWDER TOPICAL
Qty: 60 G | Refills: 0 | Status: SHIPPED | OUTPATIENT
Start: 2024-02-29 | End: 2024-07-15

## 2024-03-12 ENCOUNTER — OFFICE VISIT (OUTPATIENT)
Dept: FAMILY MEDICINE | Facility: CLINIC | Age: 64
End: 2024-03-12
Attending: FAMILY MEDICINE
Payer: COMMERCIAL

## 2024-03-12 ENCOUNTER — TELEPHONE (OUTPATIENT)
Dept: FAMILY MEDICINE | Facility: CLINIC | Age: 64
End: 2024-03-12

## 2024-03-12 ENCOUNTER — OFFICE VISIT (OUTPATIENT)
Dept: PHARMACY | Facility: CLINIC | Age: 64
End: 2024-03-12
Payer: COMMERCIAL

## 2024-03-12 VITALS
TEMPERATURE: 96.8 F | HEIGHT: 60 IN | SYSTOLIC BLOOD PRESSURE: 128 MMHG | OXYGEN SATURATION: 97 % | RESPIRATION RATE: 14 BRPM | WEIGHT: 234.5 LBS | BODY MASS INDEX: 46.04 KG/M2 | DIASTOLIC BLOOD PRESSURE: 74 MMHG | HEART RATE: 75 BPM

## 2024-03-12 DIAGNOSIS — N18.31 TYPE 2 DIABETES MELLITUS WITH STAGE 3A CHRONIC KIDNEY DISEASE, WITH LONG-TERM CURRENT USE OF INSULIN (H): ICD-10-CM

## 2024-03-12 DIAGNOSIS — R35.0 URINARY FREQUENCY: ICD-10-CM

## 2024-03-12 DIAGNOSIS — Z79.4 TYPE 2 DIABETES MELLITUS WITH MILD NONPROLIFERATIVE RETINOPATHY WITHOUT MACULAR EDEMA, WITH LONG-TERM CURRENT USE OF INSULIN, UNSPECIFIED LATERALITY (H): ICD-10-CM

## 2024-03-12 DIAGNOSIS — E11.3299 TYPE 2 DIABETES MELLITUS WITH MILD NONPROLIFERATIVE RETINOPATHY WITHOUT MACULAR EDEMA, WITH LONG-TERM CURRENT USE OF INSULIN, UNSPECIFIED LATERALITY (H): ICD-10-CM

## 2024-03-12 DIAGNOSIS — I10 HTN, GOAL BELOW 140/90: ICD-10-CM

## 2024-03-12 DIAGNOSIS — E11.22 TYPE 2 DIABETES MELLITUS WITH STAGE 3A CHRONIC KIDNEY DISEASE, WITH LONG-TERM CURRENT USE OF INSULIN (H): ICD-10-CM

## 2024-03-12 DIAGNOSIS — F25.1 SCHIZOAFFECTIVE DISORDER, DEPRESSIVE TYPE (H): Primary | ICD-10-CM

## 2024-03-12 DIAGNOSIS — M54.2 CERVICALGIA: ICD-10-CM

## 2024-03-12 DIAGNOSIS — Z00.00 ENCOUNTER FOR MEDICARE ANNUAL WELLNESS EXAM: Primary | ICD-10-CM

## 2024-03-12 DIAGNOSIS — Z79.4 TYPE 2 DIABETES MELLITUS WITH STAGE 3A CHRONIC KIDNEY DISEASE, WITH LONG-TERM CURRENT USE OF INSULIN (H): ICD-10-CM

## 2024-03-12 LAB
ALBUMIN UR-MCNC: NEGATIVE MG/DL
APPEARANCE UR: CLEAR
BACTERIA #/AREA URNS HPF: ABNORMAL /HPF
BILIRUB UR QL STRIP: NEGATIVE
COLOR UR AUTO: YELLOW
GLUCOSE UR STRIP-MCNC: NEGATIVE MG/DL
HGB UR QL STRIP: NEGATIVE
KETONES UR STRIP-MCNC: NEGATIVE MG/DL
LEUKOCYTE ESTERASE UR QL STRIP: ABNORMAL
NITRATE UR QL: NEGATIVE
PH UR STRIP: 6 [PH] (ref 5–7)
RBC #/AREA URNS AUTO: ABNORMAL /HPF
SP GR UR STRIP: 1.02 (ref 1–1.03)
SQUAMOUS #/AREA URNS AUTO: ABNORMAL /LPF
UROBILINOGEN UR STRIP-ACNC: 0.2 E.U./DL
WBC #/AREA URNS AUTO: ABNORMAL /HPF

## 2024-03-12 PROCEDURE — 99396 PREV VISIT EST AGE 40-64: CPT | Performed by: FAMILY MEDICINE

## 2024-03-12 PROCEDURE — 99606 MTMS BY PHARM EST 15 MIN: CPT | Performed by: PHARMACIST

## 2024-03-12 PROCEDURE — 81001 URINALYSIS AUTO W/SCOPE: CPT | Performed by: FAMILY MEDICINE

## 2024-03-12 PROCEDURE — 99213 OFFICE O/P EST LOW 20 MIN: CPT | Mod: 25 | Performed by: FAMILY MEDICINE

## 2024-03-12 PROCEDURE — 99607 MTMS BY PHARM ADDL 15 MIN: CPT | Performed by: PHARMACIST

## 2024-03-12 PROCEDURE — 87086 URINE CULTURE/COLONY COUNT: CPT | Performed by: FAMILY MEDICINE

## 2024-03-12 RX ORDER — IBUPROFEN 200 MG
200 TABLET ORAL EVERY 4 HOURS PRN
COMMUNITY

## 2024-03-12 RX ORDER — LIDOCAINE 4 G/G
PATCH TOPICAL
Qty: 30 PATCH | Refills: 11 | Status: SHIPPED | OUTPATIENT
Start: 2024-03-12

## 2024-03-12 SDOH — HEALTH STABILITY: PHYSICAL HEALTH: ON AVERAGE, HOW MANY MINUTES DO YOU ENGAGE IN EXERCISE AT THIS LEVEL?: 30 MIN

## 2024-03-12 SDOH — HEALTH STABILITY: PHYSICAL HEALTH: ON AVERAGE, HOW MANY DAYS PER WEEK DO YOU ENGAGE IN MODERATE TO STRENUOUS EXERCISE (LIKE A BRISK WALK)?: 2 DAYS

## 2024-03-12 ASSESSMENT — COLUMBIA-SUICIDE SEVERITY RATING SCALE - C-SSRS
6. HAVE YOU EVER DONE ANYTHING, STARTED TO DO ANYTHING, OR PREPARED TO DO ANYTHING TO END YOUR LIFE?: YES, LIFETIME
5. IN THE PAST MONTH, HAVE YOU STARTED TO WORK OUT OR WORKED OUT THE DETAILS OF HOW TO KILL YOURSELF? DO YOU INTEND TO CARRY OUT THIS PLAN?: NO
2. IN THE PAST MONTH, HAVE YOU ACTUALLY HAD ANY THOUGHTS OF KILLING YOURSELF?: YES
1. WITHIN THE PAST MONTH, HAVE YOU WISHED YOU WERE DEAD OR WISHED YOU COULD GO TO SLEEP AND NOT WAKE UP?: YES
5. IN THE PAST MONTH, HAVE YOU STARTED TO WORK OUT OR WORKED OUT THE DETAILS OF HOW TO KILL YOURSELF? DO YOU INTEND TO CARRY OUT THIS PLAN?: NO
3. IN THE PAST MONTH, HAVE YOU BEEN THINKING ABOUT HOW YOU MIGHT KILL YOURSELF?: YES

## 2024-03-12 ASSESSMENT — PATIENT HEALTH QUESTIONNAIRE - PHQ9
10. IF YOU CHECKED OFF ANY PROBLEMS, HOW DIFFICULT HAVE THESE PROBLEMS MADE IT FOR YOU TO DO YOUR WORK, TAKE CARE OF THINGS AT HOME, OR GET ALONG WITH OTHER PEOPLE: SOMEWHAT DIFFICULT
SUM OF ALL RESPONSES TO PHQ QUESTIONS 1-9: 18
SUM OF ALL RESPONSES TO PHQ QUESTIONS 1-9: 18

## 2024-03-12 ASSESSMENT — PAIN SCALES - GENERAL: PAINLEVEL: EXTREME PAIN (8)

## 2024-03-12 ASSESSMENT — SOCIAL DETERMINANTS OF HEALTH (SDOH): HOW OFTEN DO YOU GET TOGETHER WITH FRIENDS OR RELATIVES?: THREE TIMES A WEEK

## 2024-03-12 NOTE — PROGRESS NOTES
Preventive Care Visit  [facility]  Albino Rainey MD, Family Medicine  Mar 12, 2024    Assessment & Plan     Encounter for Medicare annual wellness exam  Routine wellness issues were addressed today.  Follow-up for wellness exam in 1 year.  - PRIMARY CARE FOLLOW-UP SCHEDULING; Future    Urinary frequency  She does note some urinary frequency, suprapubic discomfort, low back pain concerning for her to have a urinary tract infection so urine analysis and urine culture will be collected today.  - UA with Microscopic reflex to Culture - lab collect; Future  - Urine Culture Aerobic Bacterial - lab collect; Future  - UA with Microscopic reflex to Culture - lab collect  - Urine Culture Aerobic Bacterial - lab collect  - UA Microscopic with Reflex to Culture    Type 2 diabetes mellitus with stage 3a chronic kidney disease, with long-term current use of insulin (H)  She seems to be tolerating the restart of her Mounjaro 2.5 mg weekly and will increase this time to 5 mg weekly.  - tirzepatide (MOUNJARO) 5 MG/0.5ML pen; Inject 5 mg Subcutaneous every 7 days    Cervicalgia  Lidoderm patch directions were clarified to allow her to apply at night and remove in the morning.  - Lidocaine (LIDOCAINE PAIN RELIEF) 4 % Patch; APPLY 1 PATCH ONTO SKIN EVERY 24 HOURS ; APPLY AT NIGHT AND REMOVE IN THE MORNING ( TWELVE HOURS PATCH FREE)            Depression Screening Follow Up        3/12/2024    10:27 AM   PHQ   PHQ-9 Total Score 18   Q9: Thoughts of better off dead/self-harm past 2 weeks Several days   F/U: Thoughts of suicide or self-harm Yes   F/U: Self harm-plan Yes   F/U: Self-harm action No   F/U: Safety concerns No               3/12/2024    12:35 PM   C-SSRS (Brief [place])   Within the last month, have you wished you were dead or wished you could go to sleep and not wake up? Yes   Within the last month, have you had any actual thoughts of killing yourself? Yes   Within the last  month, have you been thinking about how you might do this? Yes   Within the last month, have you had these thoughts and had some intention of acting on them? No   Within the last month, have you started to work out or worked out the details of how to kill yourself with the intent to carry out this plan? No   Within the last month, have you ever done anything, started to do anything, or prepared to do anything to end your life? Yes, lifetime         Follow Up       Follow Up Actions Taken  Crisis resource information provided in the After Visit Summary  Referred patient back to mental health provider    Discussed the following ways the patient can remain in a safe environment:  secure medications:   and remove things I could use to hurt myself: Knives  Counseling  Appropriate preventive services were discussed with this patient, including applicable screening as appropriate for fall prevention, nutrition, physical activity, Tobacco-use cessation, weight loss and cognition.  Checklist reviewing preventive services available has been given to the patient.  Reviewed patient's diet, addressing concerns and/or questions.   She is at risk for lack of exercise and has been provided with information to increase physical activity for the benefit of her well-being.   She is at risk for psychosocial distress and has been provided with information to reduce risk.   Discussed possible causes of fatigue. The patient's PHQ-9 score is consistent with moderate depression. She was provided with information regarding depression.       FUTURE APPOINTMENTS:       - Follow-up visit in 1 month    Surjit Barrett is a 64 year old, presenting for the following:  Annual Visit        3/12/2024    10:40 AM   Additional Questions   Roomed by heath   Accompanied by justin lopez)         Health Care Directive  Patient does not have a Health Care Directive or Living Will: Patient states has Advance Directive and will bring in a copy to  clinic.    HPI  Patient is here today for an annual wellness visit.  There are couple concerns going on.  Nena reports that her mood has declined significantly and she is having suicidal ideation including specific thoughts of taking a bunch of pills or cutting herself.  She is seen today with staff from her facility who confirmed that both her medications and any knives or other sharp objects at the facility are secured and Nena herself admits that even though she is having these thoughts, she does not have a way to act upon them at this time.  Curiously, it appears that her psychiatric provider may have changed directions on her sertraline to as needed which means that she has not been using it.  The change seems to coincide with when she started develop more of these thoughts of self-harm.  Staff accompanying the patient to her visit today were informed of options for emergent mental health assessment including but not limited to the [facility] emergency room and the empath unit at [facility].    Nena's other concern today is a possible urine infection.  She describes increased frequency along with some mild suprapubic and low back discomfort.  She has not had any fever, nausea, or vomiting and denies any generalized abdominal pain.    Those are the 2 main concerns today.            3/12/2024   General Health   How would you rate your overall physical health? (!) POOR   Feel stress (tense, anxious, or unable to sleep) Only a little   (!) STRESS CONCERN      3/12/2024   Nutrition   Diet: Regular (no restrictions)         3/12/2024   Exercise   Days per week of moderate/strenous exercise 2 days   Average minutes spent exercising at this level 30 min   (!) EXERCISE CONCERN      3/12/2024   Social Factors   Frequency of gathering with friends or relatives Three times a week   Worry food won't last until get money to buy more No   Food not last or not have enough money for food? No   Do you have  housing?  Yes   Are you worried about losing your housing? No   Lack of transportation? No   Unable to get utilities (heat,electricity)? No         3/12/2024   Activities of Daily Living- Home Safety   Needs help with the following daily activites None of the above   Safety concerns in the home None of the above         3/12/2024   Dental   Dentist two times every year? Yes         3/12/2024   Hearing Screening   Hearing concerns? None of the above         3/12/2024   Driving Risk Screening   Patient/family members have concerns about driving No         3/12/2024   General Alertness/Fatigue Screening   Have you been more tired than usual lately? (!) YES         3/12/2024   Urinary Incontinence Screening   Bothered by leaking urine in past 6 months No          Today's PHQ-9 Score:       3/12/2024    10:27 AM   PHQ-9 SCORE   PHQ-9 Total Score MyChart 18 (Moderately severe depression)   PHQ-9 Total Score 18         3/12/2024   Substance Use   Alcohol more than 3/day or more than 7/wk No   Do you have a current opioid prescription? No   How severe/bad is pain from 1 to 10? 8/10   Do you use any other substances recreationally? No     Social History     Tobacco Use    Smoking status: Never    Smokeless tobacco: Never   Vaping Use    Vaping Use: Never used   Substance Use Topics    Alcohol use: Not Currently     Comment: when younger    Drug use: No     Comment: history of          Mammogram Screening - Mammogram every 1-2 years updated in Health Maintenance based on mutual decision making      History of abnormal Pap smear: NO - age 30-65 PAP every 5 years with negative HPV co-testing recommended        Latest Ref Rng & Units 11/1/2019     1:34 PM 11/1/2019    12:57 PM 5/22/2018     2:38 PM   PAP / HPV   PAP (Historical)   NIL     HPV 16 DNA NEG^Negative Negative   Negative    HPV 18 DNA NEG^Negative Negative   Negative    Other HR HPV NEG^Negative Negative   Negative      ASCVD Risk   The 10-year ASCVD risk  score (Efren SIERRA, et al., 2019) is: 9.5%    Values used to calculate the score:      Age: 64 years      Sex: Female      Is Non- : No      Diabetic: Yes      Tobacco smoker: No      Systolic Blood Pressure: 128 mmHg      Is BP treated: Yes      HDL Cholesterol: 68 mg/dL      Total Cholesterol: 176 mg/dL            Reviewed and updated as needed this visit by Provider   Tobacco   Meds   Med Hx  Surg Hx  Fam Hx  Soc Hx Sexual Activity          Patient Active Problem List   Diagnosis    Osteopenia    Vitamin B12 deficiency without anemia    Hyperlipidemia LDL goal <100    Rotator cuff syndrome    Type 2 diabetes mellitus with mild nonproliferative retinopathy (H)    Illiterate    Irritable bowel syndrome    overweight - BMI >35    Takotsubo cardiomyopathy    CAD (coronary artery disease)    Restless legs syndrome (RLS)    CINDY (obstructive sleep apnea)- mild AHI 10.3    Verbal auditory hallucination    Chronic low back pain    Schizoaffective disorder, depressive type (H)    Migraine headache    Essential hypertension with goal blood pressure less than 130/80    Health Care Home    Lumbago    Cervicalgia    Esophageal reflux    Mild nonproliferative diabetic retinopathy (H)    Tardive dyskinesia    Left cataract    Falls frequently    History of uterine cancer    Psychophysiological insomnia    Dysuria    Infectious encephalopathy    Non-intractable vomiting with nausea    Thoughts of self harm    Gastroenteritis    Posttraumatic stress disorder    Type 2 diabetes mellitus with stage 3a chronic kidney disease, with long-term current use of insulin (H)    Positive AIDA (antinuclear antibody)    Hyperglycemia    Depression with suicidal ideation    Mixed stress and urge urinary incontinence    Chronic pain syndrome    Fibromyalgia    Dehydration    Hypoglycemia    Diarrhea, unspecified type    Has poorly balanced diet    History of substance abuse (H)    History of suicidal behavior     Localized edema    Fatigue, unspecified type    Vitamin D deficiency    DDD (degenerative disc disease), lumbosacral    Impingement syndrome of shoulder region, right    Schizoaffective disorder, unspecified type (H)    Aspiration pneumonia due to vomit (H)     Past Surgical History:   Procedure Laterality Date    CATARACT IOL, RT/LT Bilateral 2017    CHOLECYSTECTOMY      COLONOSCOPY N/A 3/16/2017    Procedure: COLONOSCOPY;  Surgeon: Traci Gonzalez MD;  Location:  GI    Coronary CTA  5/21/2014    HYSTERECTOMY  1983    uterine cancer yearly pap's per provider.    HYSTERECTOMY      LAPAROSCOPIC CHOLECYSTECTOMY  2008    PHACOEMULSIFICATION CLEAR CORNEA WITH STANDARD INTRAOCULAR LENS IMPLANT Left 5/5/2017    Procedure: PHACOEMULSIFICATION CLEAR CORNEA WITH STANDARD INTRAOCULAR LENS IMPLANT;  LEFT EYE PHACOEMULSIFICATION CLEAR CORNEA WITH STANDARD INTRAOCULAR LENS IMPLANT ;  Surgeon: Tyra Diaz MD;  Location:  [facility]    PHACOEMULSIFICATION CLEAR CORNEA WITH STANDARD INTRAOCULAR LENS IMPLANT Right 6/30/2017    Procedure: PHACOEMULSIFICATION CLEAR CORNEA WITH STANDARD INTRAOCULAR LENS IMPLANT;  RIGHT EYE PHACOEMULSIFICATION CLEAR CORNEA WITH STANDARD INTRAOCULAR LENS IMPLANT;  Surgeon: Tyra Diaz MD;  Location:  [facility]    RELEASE TRIGGER FINGER  10/11/2012    Left thumb. Procedure: RELEASE TRIGGER FINGER;  LEFT THUMB TRIGGER RELEASE;  Surgeon: Tay Langley MD;  Location:  [facility]    RELEASE TRIGGER FINGER Right 12/26/2016    Procedure: RELEASE TRIGGER FINGER;  Surgeon: Albino Castañeda MD;  Location: [facility] OOPHORECTORMY FOR JALENALEJO, W/BX  1983    UTERINE       Social History     Tobacco Use    Smoking status: Never    Smokeless tobacco: Never   Substance Use Topics    Alcohol use: Not Currently     Comment: when younger     Family History   Problem Relation Age of Onset    Cancer Mother         BLADDER    Respiratory Mother         COPD    Gastrointestinal Disease Mother          CIRRHOSIS OF LI BOLIVAR    Alcohol/Drug Mother     Diabetes Mother     Hypertension Mother     Lipids Mother     C.A.D. Mother     Glaucoma Mother     Alcohol/Drug Sister     Mental Illness Sister     Alcohol/Drug Sister     Psychotic Disorder Sister     Cancer Maternal Grandmother         UNKNOWN TYPE    Cancer Brother         COLON    Cancer - colorectal Brother         IN HIS LATE 30S    Alcohol/Drug Brother          OF HEROIN OVERDOSE AT AGE 22 YRS    Macular Degeneration No family hx of          Current Outpatient Medications   Medication Sig Dispense Refill    acetaminophen (TYLENOL) 500 MG tablet Take 1,000 mg by mouth every 8 hours as needed for mild pain      albuterol (PROAIR HFA/PROVENTIL HFA/VENTOLIN HFA) 108 (90 Base) MCG/ACT inhaler Inhale 2 puffs into the lungs every 6 hours as needed for shortness of breath, wheezing or cough 18 g 3    Alcohol Swabs (ALCOHOL PREP) 70 % PADS APPLY 1 UNITS TOPICALLY 8 TIMES DAILY 200 each 3    alendronate (FOSAMAX) 70 MG tablet Take 1 tablet (70 mg) by mouth every 7 days 4 tablet 11    amantadine (SYMMETREL) 100 MG capsule TAKE 1 CAPSULE BY MOUTH DAILY 30 capsule 11    amLODIPine (NORVASC) 10 MG tablet Take 1 tablet (10 mg) by mouth every morning 30 tablet 11    aspirin (ASPIRIN LOW DOSE) 81 MG EC tablet Take 1 tablet (81 mg) by mouth daily 28 tablet 11    atorvastatin (LIPITOR) 80 MG tablet TAKE 1 TABLET BY MOUTH DAILY 90 tablet 3    blood glucose (NO BRAND SPECIFIED) test strip Use to test blood sugar 10 times daily or as directed. 100 strip 11    calcium carbonate (OS-ALLEN) 1500 (600 Ca) MG tablet TAKE 1 TABLET BY MOUTH DAILY 90 tablet 3    chlorhexidine (PERIDEX) 0.12 % solution Swish and spit 10 mLs in mouth 2 times daily 1893 mL 3    clonazePAM (KLONOPIN) 0.5 MG tablet TAKE 1 TABLET BY MOUTH EVERY NIGHT AS NEEDED FOR ANXIETY 28 tablet 5    Continuous Blood Gluc Sensor (DEXCOM G6 SENSOR) MISC Change every 10 days. 9 each 2    Continuous Blood Gluc Transmit (DEXCOM  G6 TRANSMITTER) MISC Change every 3 months. 1 each 1    diclofenac (VOLTAREN) 1 % topical gel Apply 4 grams to painful area at bedtime. May use an additional 3 doses during the day as needed 100 g 1    gabapentin (NEURONTIN) 300 MG capsule Take 1 capsule (300 mg) by mouth at bedtime 28 capsule 11    HUMALOG KWIKPEN 100 UNIT/ML soln Inject 0-12 Units Subcutaneous at bedtime BEDTIME-If >4 hours since last Humalog injection For  - 249 give 3 units. For  - 299 give 6 units. For  - 349 give 9 units. For BG = or > 350 give 12 units. 15 mL 1    hydrOXYzine (VISTARIL) 25 MG capsule TAKE 1 CAPSULE BY MOUTH EVERY NIGHT AT BEDTIME ANXIETY/SLEEP 28 capsule 0    insulin glargine (LANTUS SOLOSTAR) 100 UNIT/ML pen Inject 22 Units Subcutaneous 2 times daily 45 mL 1    insulin pen needle (NOVOFINE AUTOCOVER PEN NEEDLE) 30G X 8 MM miscellaneous USE 6 DAILY OR AS DIRECTED 200 each 0    Lancets (ONETOUCH DELICA PLUS [id number]) MISC 1 EACH AS NEEDED  USE TO TEST BLOOD SUGARS 1-2 TIMES DAILY AS DIRECTED 100 each 1    leflunomide (ARAVA) 20 MG tablet Take 1 tablet (20 mg) by mouth daily Labs every 8-12 weeks 90 tablet 1    Lidocaine (LIDOCAINE PAIN RELIEF) 4 % Patch APPLY 1 PATCH ONTO SKIN EVERY 24 HOURS ; APPLY AT NIGHT AND REMOVE IN THE MORNING ( TWELVE HOURS PATCH FREE) 30 patch 11    loperamide (IMODIUM A-D) 2 MG tablet Take 1 tablet (2 mg) by mouth 4 times daily as needed for diarrhea 30 tablet 0    losartan (COZAAR) 100 MG tablet TAKE 1 TABLET BY MOUTH DAILY 28 tablet 11    Magnesium Oxide 250 MG TABS TAKE 1 TABLET BY MOUTH AT BEDTIME 28 tablet 11    melatonin 5 MG tablet Take 5 mg by mouth at bedtime      metoprolol succinate ER (TOPROL XL) 50 MG 24 hr tablet TAKE 1 TABLET BY MOUTH IN THE MORNING AND TAKE 2 TABLETS AT BEDTIME 84 tablet 11    mirabegron (MYRBETRIQ) 50 MG 24 hr tablet Take 1 tablet (50 mg) by mouth daily 90 tablet 4    naproxen (NAPROSYN) 375 MG tablet Take 1 tablet (375 mg) by mouth 2 times daily  as needed for moderate pain (denatl pain) 60 tablet 11    nystatin (MYCOSTATIN) 952458 UNIT/GM external powder APPLY TOPICALLY TWICE A DAY AS NEEDED 60 g 0    ondansetron (ZOFRAN) 4 MG tablet TAKE 1 TABLET BY MOUTH EVERY 8 HOURS AS NEEDED FOR NAUSEA 10 tablet 1    order for DME Equipment being ordered: Depends. 30 each 4    order for DME Equipment being ordered: CPAP Supplies. 1 each 0    paliperidone ER (INVEGA) 6 MG 24 hr tablet TAKE 2 TABLETS BY MOUTH EVERY NIGHT AT BEDTIME 60 tablet 11    pantoprazole (PROTONIX) 40 MG EC tablet TAKE 1 TABLET (40 MG) BY MOUTH DAILY 28 tablet 11    prazosin (MINIPRESS) 1 MG capsule Take 1 mg by mouth      QUEtiapine (SEROQUEL) 25 MG tablet TAKE 5 TABLETS BY MOUTH EVERY NIGHT AT BEDTIME 150 tablet 4    sertraline (ZOLOFT) 50 MG tablet Take 50 mg by mouth daily      STIMULANT LAXATIVE 8.6-50 MG tablet TAKE 1 TABLET BY MOUTH DAILY AND EXTRA 1 TABLET AS NEEDED FOR CONSTIPATION 56 tablet 1    tirzepatide (MOUNJARO) 2.5 MG/0.5ML pen Inject 2.5 mg Subcutaneous every 7 days 2 mL 11    tirzepatide (MOUNJARO) 5 MG/0.5ML pen Inject 5 mg Subcutaneous every 7 days 2 mL 1    traZODone (DESYREL) 100 MG tablet TAKE 1 TABLET BY MOUTH AT BEDTIME 28 tablet 11    vitamin C (ASCORBIC ACID) 500 MG tablet TAKE 2 TABLETS BY MOUTH IN THE MORNING AND TAKE 2 TABLETS BY MOUTH IN THE EVENING 112 tablet 4    zinc oxide (DESITIN) 20 % external ointment Apply topically 3 times daily as needed for irritation (barrier cream) 60 g 3    ibuprofen (ADVIL/MOTRIN) 200 MG tablet Take 200 mg by mouth every 4 hours as needed for pain       Allergies   Allergen Reactions    Imidazole Antifungals Hives     Tolerates diflucan    Ketoprofen Itching     Pruritis to topical    Lisinopril Hives    Metformin Other (See Comments)     Patient hospitalized for lactic acidosis - admitting provider suspectd caused by metformin    Metronidazole Hives    Posaconazole Hives     Tolerates diflucan     Current providers sharing in care for  this patient include:  Patient Care Team:  Albino Rainey MD as PCP - General (Family Medicine)  Regina Heredia as   Liliana Miller PsyD as Psych Associate  Pavelkimi WilsonTeresa as [facility] Worker  Tiburcio Chacon MD as MD (Ophthalmology)  Debbie Germain PA-C as Physician Assistant (Physician Assistant)  Eugenia De Jesus [facility] as Pharmacist (Pharmacist)  [facility] ([facility] ([facility] ([place]  Tiburcio Roth MD as Assigned Surgical Provider  Gisselle Nash PA-C as Assigned OBGYN Provider  Eugenia De Jesus [facility] as Assigned MTM Pharmacist  Aniya Mcgregor LICSW as  ( - Clinical)  Albino Rainey MD as Assigned PCP  Luiz Hernandez DO as Assigned Musculoskeletal Provider  Jeny Weir as CADI worker  Edinson RN with pt group [facility] as   Chanell Duque PA-C as Physician Assistant (Endocrinology, Diabetes, and Metabolism)  Chanell Duque PA-C as Assigned Endocrinology Provider  Alexus Posey MD as Assigned Rheumatology Provider    The following health maintenance items are reviewed in Epic and correct as of today:  Health Maintenance   Topic Date Due    RSV VACCINE (Pregnancy & 60+) (1 - 1-dose 60+ series) Never done    MAMMO SCREENING  12/09/2022    ADVANCE CARE PLANNING  10/18/2023    A1C  12/21/2023    CMP  06/11/2024    DEXA  07/14/2024    ANNUAL REVIEW OF HM ORDERS  07/25/2024    PHQ-9  09/12/2024    LIPID  09/21/2024    BMP  12/11/2024    EYE EXAM  12/11/2024    HEMOGLOBIN  12/11/2024    MICROALBUMIN  12/12/2024    DIABETIC FOOT EXAM  12/12/2024    MEDICARE ANNUAL WELLNESS VISIT  03/12/2025    COLORECTAL CANCER SCREENING  03/16/2027    DTAP/TDAP/TD IMMUNIZATION (4 - Td or Tdap) 09/15/2030    HEPATITIS C SCREENING  Completed    HIV SCREENING  Completed    DEPRESSION ACTION PLAN  Completed    MIGRAINE ACTION PLAN  Completed    INFLUENZA  "VACCINE  Completed    Pneumococcal Vaccine: Pediatrics (0 to 5 Years) and At-Risk Patients (6 to 64 Years)  Completed    URINALYSIS  Completed    ZOSTER IMMUNIZATION  Completed    COVID-19 Vaccine  Completed    IPV IMMUNIZATION  Aged Out    HPV IMMUNIZATION  Aged Out    MENINGITIS IMMUNIZATION  Aged Out    RSV MONOCLONAL ANTIBODY  Aged Out    HPV TEST  Discontinued    PAP  Discontinued         Review of Systems  Constitutional, HEENT, cardiovascular, pulmonary, gi and gu systems are negative, except as otherwise noted.     Objective    Exam  /74 (BP Location: Right arm, Patient Position: Sitting, Cuff Size: Adult Large)   Pulse 75   Temp 96.8  F (36  C) (Temporal)   Resp 14   Ht 1.535 m (5' 0.43\")   Wt 106.4 kg (234 lb 8 oz)   LMP 01/06/2015 (Exact Date)   SpO2 97%   BMI 45.14 kg/m     Estimated body mass index is 45.14 kg/m  as calculated from the following:    Height as of this encounter: 1.535 m (5' 0.43\").    Weight as of this encounter: 106.4 kg (234 lb 8 oz).    Physical Exam  GENERAL: alert and no distress  EYES: Eyes grossly normal to inspection, PERRL and conjunctivae and sclerae normal  HENT: ear canals and TM's normal, nose and mouth without ulcers or lesions  NECK: no adenopathy, no asymmetry, masses, or scars  RESP: lungs clear to auscultation - no rales, rhonchi or wheezes  CV: regular rate and rhythm, normal S1 S2, no S3 or S4, no murmur, click or rub, no peripheral edema   MS: no gross musculoskeletal defects noted, no edema  NEURO: Normal strength and tone, mentation intact and speech normal  BACK: no CVA tenderness, no paralumbar tenderness  PSYCH: mentation appears normal, affect flat, tearful, fatigued, and appearance well groomed        3/12/2024   Mini Cog   Mini-Cog Not Completed (choose reason) Patient declines   Clock Draw Score 2 Normal   3 Item Recall 2 objects recalled   Mini Cog Total Score 4                Signed Electronically by: Albino Rainey MD    Answers " submitted by the patient for this visit:  Patient Health Questionnaire (Submitted on 3/12/2024)  If you checked off any problems, how difficult have these problems made it for you to do your work, take care of things at home, or get along with other people?: Somewhat difficult  PHQ9 TOTAL SCORE: 18

## 2024-03-12 NOTE — PATIENT INSTRUCTIONS
Preventive Care Advice   This is general advice given by our system to help you stay healthy. However, your care team may have specific advice just for you. Please talk to your care team about your preventive care needs.  Nutrition  Eat 5 or more servings of fruits and vegetables each day.  Try wheat bread, brown rice and whole grain pasta (instead of white bread, rice, and pasta).  Get enough calcium and vitamin D. Check the label on foods and aim for 100% of the RDA (recommended daily allowance).  Lifestyle  Exercise at least 150 minutes each week   (30 minutes a day, 5 days a week).  Do muscle strengthening activities 2 days a week. These help control your weight and prevent disease.  No smoking.  Wear sunscreen to prevent skin cancer.  Have a dental exam and cleaning every 6 months.  Yearly exams  See your health care team every year to talk about:  Any changes in your health.  Any medicines your care team has prescribed.  Preventive care, family planning, and ways to prevent chronic diseases.  Shots (vaccines)   HPV shots (up to age 26), if you've never had them before.  Hepatitis B shots (up to age 59), if you've never had them before.  COVID-19 shot: Get this shot when it's due.  Flu shot: Get a flu shot every year.  Tetanus shot: Get a tetanus shot every 10 years.  Pneumococcal, hepatitis A, and RSV shots: Ask your care team if you need these based on your risk.  Shingles shot (for age 50 and up).  General health tests  Diabetes screening:  Starting at age 35, Get screened for diabetes at least every 3 years.  If you are younger than age 35, ask your care team if you should be screened for diabetes.  Cholesterol test: At age 39, start having a cholesterol test every 5 years, or more often if advised.  Bone density scan (DEXA): At age 50, ask your care team if you should have this scan for osteoporosis (brittle bones).  Hepatitis C: Get tested at least once in your life.  STIs (sexually transmitted  infections)  Before age 24: Ask your care team if you should be screened for STIs.  After age 24: Get screened for STIs if you're at risk. You are at risk for STIs (including HIV) if:  You are sexually active with more than one person.  You don't use condoms every time.  You or a partner was diagnosed with a sexually transmitted infection.  If you are at risk for HIV, ask about PrEP medicine to prevent HIV.  Get tested for HIV at least once in your life, whether you are at risk for HIV or not.  Cancer screening tests  Cervical cancer screening: If you have a cervix, begin getting regular cervical cancer screening tests at age 21. Most people who have regular screenings with normal results can stop after age 65. Talk about this with your provider.  Breast cancer scan (mammogram): If you've ever had breasts, begin having regular mammograms starting at age 40. This is a scan to check for breast cancer.  Colon cancer screening: It is important to start screening for colon cancer at age 45.  Have a colonoscopy test every 10 years (or more often if you're at risk) Or, ask your provider about stool tests like a FIT test every year or Cologuard test every 3 years.  To learn more about your testing options, visit: https://www.[employer]/407737.pdf.  For help making a decision, visit: https://bit.ly/ez34974.  Prostate cancer screening test: If you have a prostate and are age 55 to 69, ask your provider if you would benefit from a yearly prostate cancer screening test.  Lung cancer screening: If you are a current or former smoker age 50 to 80, ask your care team if ongoing lung cancer screenings are right for you.  For informational purposes only. Not to replace the advice of your health care provider. Copyright   2023 [employer]. All rights reserved. Clinically reviewed by the [facility]. [employer] 598137 - REV 01/24.    Preventing Falls: Care Instructions  Injuries and health  problems such as trouble walking or poor eyesight can increase your risk of falling. So can some medicines. But there are things you can do to help prevent falls. You can exercise to get stronger. You can also arrange your home to make it safer.    Talk to your doctor about the medicines you take. Ask if any of them increase the risk of falls and whether they can be changed or stopped.   Try to exercise regularly. It can help improve your strength and balance. This can help lower your risk of falling.     Practice fall safety and prevention.    Wear low-heeled shoes that fit well and give your feet good support. Talk to your doctor if you have foot problems that make this hard.  Carry a cellphone or wear a medical alert device that you can use to call for help.  Use stepladders instead of chairs to reach high objects. Don't climb if you're at risk for falls. Ask for help, if needed.  Wear the correct eyeglasses, if you need them.    Make your home safer.    Remove rugs, cords, clutter, and furniture from walkways.  Keep your house well lit. Use night-lights in hallways and bathrooms.  Install and use sturdy handrails on stairways.  Wear nonskid footwear, even inside. Don't walk barefoot or in socks without shoes.    Be safe outside.    Use handrails, curb cuts, and ramps whenever possible.  Keep your hands free by using a shoulder bag or backpack.  Try to walk in well-lit areas. Watch out for uneven ground, changes in pavement, and debris.  Be careful in the winter. Walk on the grass or gravel when sidewalks are slippery. Use de-icer on steps and walkways. Add non-slip devices to shoes.    Put grab bars and nonskid mats in your shower or tub and near the toilet. Try to use a shower chair or bath bench when bathing.   Get into a tub or shower by putting in your weaker leg first. Get out with your strong side first. Have a phone or medical alert device in the bathroom with you.   Where can you learn more?  Go to  "https://www.[employer].net/patiented  Enter G117 in the search box to learn more about \"Preventing Falls: Care Instructions.\"  Current as of: July 18, 2023               Content Version: 13.8    8877-8750 [employer].   Care instructions adapted under license by your healthcare professional. If you have questions about a medical condition or this instruction, always ask your healthcare professional. [employer] disclaims any warranty or liability for your use of this information.      Learning About Stress  What is stress?     Stress is your body's response to a hard situation. Your body can have a physical, emotional, or mental response. Stress is a fact of life for most people, and it affects everyone differently. What causes stress for you may not be stressful for someone else.  A lot of things can cause stress. You may feel stress when you go on a job interview, take a test, or run a race. This kind of short-term stress is normal and even useful. It can help you if you need to work hard or react quickly. For example, stress can help you finish an important job on time.  Long-term stress is caused by ongoing stressful situations or events. Examples of long-term stress include long-term health problems, ongoing problems at work, or conflicts in your family. Long-term stress can harm your health.  How does stress affect your health?  When you are stressed, your body responds as though you are in danger. It makes hormones that speed up your heart, make you breathe faster, and give you a burst of energy. This is called the fight-or-flight stress response. If the stress is over quickly, your body goes back to normal and no harm is done.  But if stress happens too often or lasts too long, it can have bad effects. Long-term stress can make you more likely to get sick, and it can make symptoms of some diseases worse. If you tense up when you are stressed, you may develop neck, shoulder, or low " back pain. Stress is linked to high blood pressure and heart disease.  Stress also harms your emotional health. It can make you shelton, tense, or depressed. Your relationships may suffer, and you may not do well at work or school.  What can you do to manage stress?  You can try these things to help manage stress:   Do something active. Exercise or activity can help reduce stress. Walking is a great way to get started. Even everyday activities such as housecleaning or yard work can help.  Try yoga or stepan chi. These techniques combine exercise and meditation. You may need some training at first to learn them.  Do something you enjoy. For example, listen to music or go to a movie. Practice your hobby or do volunteer work.  Meditate. This can help you relax, because you are not worrying about what happened before or what may happen in the future.  Do guided imagery. Imagine yourself in any setting that helps you feel calm. You can use online videos, books, or a teacher to guide you.  Do breathing exercises. For example:  From a standing position, bend forward from the waist with your knees slightly bent. Let your arms dangle close to the floor.  Breathe in slowly and deeply as you return to a standing position. Roll up slowly and lift your head last.  Hold your breath for just a few seconds in the standing position.  Breathe out slowly and bend forward from the waist.  Let your feelings out. Talk, laugh, cry, and express anger when you need to. Talking with supportive friends or family, a counselor, or a demi leader about your feelings is a healthy way to relieve stress. Avoid discussing your feelings with people who make you feel worse.  Write. It may help to write about things that are bothering you. This helps you find out how much stress you feel and what is causing it. When you know this, you can find better ways to cope.  What can you do to prevent stress?  You might try some of these things to help prevent  "stress:  Manage your time. This helps you find time to do the things you want and need to do.  Get enough sleep. Your body recovers from the stresses of the day while you are sleeping.  Get support. Your family, friends, and community can make a difference in how you experience stress.  Limit your news feed. Avoid or limit time on social media or news that may make you feel stressed.  Do something active. Exercise or activity can help reduce stress. Walking is a great way to get started.  Where can you learn more?  Go to https://www.[employer].net/patiented  Enter N032 in the search box to learn more about \"Learning About Stress.\"  Current as of: February 26, 2023               Content Version: 13.8    3375-9630 [employer].   Care instructions adapted under license by your healthcare professional. If you have questions about a medical condition or this instruction, always ask your healthcare professional. [employer] disclaims any warranty or liability for your use of this information.      Learning About Sleeping Well  What does sleeping well mean?     Sleeping well means getting enough sleep to feel good and stay healthy. How much sleep is enough varies among people.  The number of hours you sleep and how you feel when you wake up are both important. If you do not feel refreshed, you probably need more sleep. Another sign of not getting enough sleep is feeling tired during the day.  Experts recommend that adults get at least 7 or more hours of sleep per day. Children and older adults need more sleep.  Why is getting enough sleep important?  Getting enough quality sleep is a basic part of good health. When your sleep suffers, your physical health, mood, and your thoughts can suffer too. You may find yourself feeling more grumpy or stressed. Not getting enough sleep also can lead to serious problems, including injury, accidents, anxiety, and depression.  What might cause poor " "sleeping?  Many things can cause sleep problems, including:  Changes to your sleep schedule.  Stress. Stress can be caused by fear about a single event, such as giving a speech. Or you may have ongoing stress, such as worry about work or school.  Depression, anxiety, and other mental or emotional conditions.  Changes in your sleep habits or surroundings. This includes changes that happen where you sleep, such as noise, light, or sleeping in a different bed. It also includes changes in your sleep pattern, such as having jet lag or working a late shift.  Health problems, such as pain, breathing problems, and restless legs syndrome.  Lack of regular exercise.  Using alcohol, nicotine, or caffeine before bed.  How can you help yourself?  Here are some tips that may help you sleep more soundly and wake up feeling more refreshed.  Your sleeping area   Use your bedroom only for sleeping and sex. A bit of light reading may help you fall asleep. But if it doesn't, do your reading elsewhere in the house. Try not to use your TV, computer, smartphone, or tablet while you are in bed.  Be sure your bed is big enough to stretch out comfortably, especially if you have a sleep partner.  Keep your bedroom quiet, dark, and cool. Use curtains, blinds, or a sleep mask to block out light. To block out noise, use earplugs, soothing music, or a \"white noise\" machine.  Your evening and bedtime routine   Create a relaxing bedtime routine. You might want to take a warm shower or bath, or listen to soothing music.  Go to bed at the same time every night. And get up at the same time every morning, even if you feel tired.  What to avoid   Limit caffeine (coffee, tea, caffeinated sodas) during the day, and don't have any for at least 6 hours before bedtime.  Avoid drinking alcohol before bedtime. Alcohol can cause you to wake up more often during the night.  Try not to smoke or use tobacco, especially in the evening. Nicotine can keep you " "awake.  Limit naps during the day, especially close to bedtime.  Avoid lying in bed awake for too long. If you can't fall asleep or if you wake up in the middle of the night and can't get back to sleep within about 20 minutes, get out of bed and go to another room until you feel sleepy.  Avoid taking medicine right before bed that may keep you awake or make you feel hyper or energized. Your doctor can tell you if your medicine may do this and if you can take it earlier in the day.  If you can't sleep   Imagine yourself in a peaceful, pleasant scene. Focus on the details and feelings of being in a place that is relaxing.  Get up and do a quiet or boring activity until you feel sleepy.  Avoid drinking any liquids before going to bed to help prevent waking up often to use the bathroom.  Where can you learn more?  Go to https://www.[employer]base.[employer]TV/patiented  Enter J942 in the search box to learn more about \"Learning About Sleeping Well.\"  Current as of: July 11, 2023               Content Version: 13.8    7955-4338 [employer].   Care instructions adapted under license by your healthcare professional. If you have questions about a medical condition or this instruction, always ask your healthcare professional. [employer] disclaims any warranty or liability for your use of this information.      Learning About Depression Screening  What is depression screening?  Depression screening is a way to see if you have depression symptoms. It may be done by a doctor or counselor. It's often part of a routine checkup. That's because your mental health is just as important as your physical health.  Depression is a mental health condition that affects how you feel, think, and act. You may:  Have less energy.  Lose interest in your daily activities.  Feel sad and grouchy for a long time.  Depression is very common. It affects people of all ages.  Many things can lead to depression. Some people become " "depressed after they have a stroke or find out they have a major illness like cancer or heart disease. The death of a loved one or a breakup may lead to depression. It can run in families. Most experts believe that a combination of inherited genes and stressful life events can cause it.  What happens during screening?  You may be asked to fill out a form about your depression symptoms. You and the doctor will discuss your answers. The doctor may ask you more questions to learn more about how you think, act, and feel.  What happens after screening?  If you have symptoms of depression, your doctor will talk to you about your options.  Doctors usually treat depression with medicines or counseling. Often, combining the two works best. Many people don't get help because they think that they'll get over the depression on their own. But people with depression may not get better unless they get treatment.  The cause of depression is not well understood. There may be many factors involved. But if you have depression, it's not your fault.  A serious symptom of depression is thinking about death or suicide. If you or someone you care about talks about this or about feeling hopeless, get help right away.  It's important to know that depression can be treated. Medicine, counseling, and self-care may help.  Where can you learn more?  Go to https://www.[employer].com.net/patiented  Enter T185 in the search box to learn more about \"Learning About Depression Screening.\"  Current as of: June 25, 2023               Content Version: 13.8    4219-2195 [employer].   Care instructions adapted under license by your healthcare professional. If you have questions about a medical condition or this instruction, always ask your healthcare professional. [employer] disclaims any warranty or liability for your use of this information.      "

## 2024-03-12 NOTE — TELEPHONE ENCOUNTER
Called left brief message that test results from today's visit look good so far.    Albino Rainey MD

## 2024-03-12 NOTE — PROGRESS NOTES
Medication Therapy Management (MTM) Encounter    ASSESSMENT:                            Medication Adherence/Access: No issues identified    Diabetes:  Stable. Continue to titrate Mounjaro back to previously prescribed dose for weight management, will consider further dose escalation as tolerated and plan to review diabetes in greater detail at follow-up. Consider repeat A1c at follow-up as well.     Hypertension:   Stable, patient is meeting blood pressure goal of <130/80, no changes.     Schizoaffective:   Patient has not been receiving sertraline daily for the past few weeks which is likely contributing to increased depression. Will restart today. Education on when to take for emergency services.    PLAN:                            Re-start taking sertraline 50mg daily     Increase Mounjaro back to 5mg every 7 days - ordered by PCP    Follow-up: 1 month    SUBJECTIVE/OBJECTIVE:                          Nena Tang is a 64 year old female coming in for a follow-up visit from 2/6/2024. Today's visit is a co-visit with Dr. Dick.      Reason for visit: Diabetes Management.    Allergies/ADRs: Reviewed in chart  Past Medical History: Reviewed in chart  Tobacco: She reports that she has never smoked. She has never used smokeless tobacco.  Medication Adherence/Access: no issues reported    Diabetes   Lantus 22 units twice daily  Humalog sliding scale 8 units + 3 units per 50mg/dL starting glucose 81 and max 23 units   Mounjaro 2.5mg weekly (restarting, held due to GI symptoms)  Side effects: none. Nausea, vomiting, diarrhea has resolved.  Reports no constipation but typical BM 2-3 times per week.   Blood sugar monitoring: Continuous Glucose Monitor - did not review in detail today, reports glucose has been OK.   Current diabetes symptoms: none  Diet/Exercise: did not review today     Eye exam is up to date  Foot exam is up to date  Urine Albumin:   Lab Results   Component Value Date    UMALCR 29.10 (H)  12/12/2023      Lab Results   Component Value Date    A1C 7.0 (H) 09/21/2023       Hypertension   Amlodipine 10mg daily  Metoprolol XL 50mg AM and 100mg PM (started in 2009 when diagnosed with CAD)  Losartan 100mg daily  Patient reports no current medication side effects.   Patient has blood pressure monitored by group home.  Home BP monitoring :  When checking blood pressure in the evening 9-10pm, patient was 140/87 yesterday.   Medication history:   Chlorthalidone - stopped while inpatient, not restarted since blood pressure was well controlled.       BP Readings from Last 3 Encounters:   03/12/24 128/74   02/06/24 (!) 156/82   01/30/24 (!) 160/84     Pulse Readings from Last 3 Encounters:   02/06/24 71   01/30/24 72   01/16/24 72       Schizoaffective:  Quetiapine 125mg at bedtime   Paliperidone ER 12mg at bedtime  Sertraline 50mg - written PRN and has not been taking, states Rx was changed from scheduled to PRN 2 weeks ago by psychiatry.   Hydroxyzine 25mg in the evening   Trazodone 100mg at bedtime  Clonazepam 0.5mg at bedtime  Prazosin 1mg at bedtime  Amantadine 100mg daily    Patient reports feeling down for the past 2 weeks. Tearful today during the visit. She states this is a hard time of year for her. Reports increased suicidal thoughts. States she wants to swallow a lot of pills or cut herself but she does not have access to either. She lives in a group home and does not have access to her medication bottles.   Sleep: hard time sleeping at night, has been napping for hours during the day. Facility is working with psychiatrist to take less naps during the day to help her sleep at night.   She saw her psychiatrist in February and has a follow up appointment next week.   She is not currently seeing a therapist.     Today's Vitals: LMP 01/06/2015 (Exact Date)   BP Readings from Last 1 Encounters:   03/12/24 128/74     Pulse Readings from Last 1 Encounters:   03/12/24 75     Wt Readings from Last 1 Encounters:  "  03/12/24 234 lb 8 oz (106.4 kg)     Ht Readings from Last 1 Encounters:   03/12/24 5' 0.43\" (1.535 m)     Estimated body mass index is 45.14 kg/m  as calculated from the following:    Height as of an earlier encounter on 3/12/24: 5' 0.43\" (1.535 m).    Weight as of an earlier encounter on 3/12/24: 234 lb 8 oz (106.4 kg).    Temp Readings from Last 1 Encounters:   03/12/24 96.8  F (36  C) (Temporal)      ----------------      I spent 30 minutes with this patient today. All changes were made via collaborative practice agreement with Albino Rainey MD. A copy of the visit note was provided to the patient's provider(s).    A summary of these recommendations was given to the patient (see AVS from today's appointment with PCP).    Eugenia De Jesus, PharmD, BCACP   Medication Therapy Management Pharmacist   [facility]  [phone]          Medication Therapy Recommendations  Schizoaffective disorder, depressive type (H)    Current Medication: sertraline (ZOLOFT) 50 MG tablet   Rationale: Frequency inappropriate - Dosage too low - Effectiveness   Recommendation: Increase Frequency   Status: Accepted per Provider             "

## 2024-03-14 LAB — BACTERIA UR CULT: NORMAL

## 2024-03-18 NOTE — NURSING NOTE
Chief Complaints and History of Present Illnesses   Patient presents with     Follow Up For     DMII: severe NPDR     HPI    Symptoms:              Comments:  6 months follow up for DMII: severe NPDR and OCT OU.  Patient states cataracts done in both eyes but vision is still blurry.   Denies floaters or flashes.   Last A1c was 8 10/2017. Patient took blood sugar this morning but forgot the number.   KEYANNA Ovalle 11/30/2017 8:18 AM                Question3/15/2024 10:44 AM CDT - Filed by PatientHow hard is it for you to pay for the very basics like food, housing, medical care, and heating?Somewhat hardWithin the past 12 months, you worried that your food would run out before you got the money to buy more.Never trueWithin the past 12 months, the food you bought just didn’t last and you didn’t have money to get more.Never trueIn the past 12 months, has lack of transportation kept you from meetings, work, or from getting things needed for daily living?NoIn the last 12 months, was there a time when you did not have a steady place to sleep or slept in a shelter (including now)?NoWould you like resources for any of these topics?No, thank you

## 2024-03-26 DIAGNOSIS — K59.00 CONSTIPATION, UNSPECIFIED CONSTIPATION TYPE: ICD-10-CM

## 2024-03-26 RX ORDER — DOCUSATE SODIUM 50 MG AND SENNOSIDES 8.6 MG 8.6; 5 MG/1; MG/1
TABLET, FILM COATED ORAL
Qty: 60 TABLET | Refills: 1 | Status: SHIPPED | OUTPATIENT
Start: 2024-03-26 | End: 2024-05-21

## 2024-04-01 ENCOUNTER — TRANSFERRED RECORDS (OUTPATIENT)
Dept: HEALTH INFORMATION MANAGEMENT | Facility: CLINIC | Age: 64
End: 2024-04-01
Payer: COMMERCIAL

## 2024-04-02 ENCOUNTER — OFFICE VISIT (OUTPATIENT)
Dept: RHEUMATOLOGY | Facility: CLINIC | Age: 64
End: 2024-04-02
Attending: INTERNAL MEDICINE
Payer: COMMERCIAL

## 2024-04-02 ENCOUNTER — LAB (OUTPATIENT)
Dept: LAB | Facility: CLINIC | Age: 64
End: 2024-04-02
Payer: COMMERCIAL

## 2024-04-02 VITALS
WEIGHT: 228.3 LBS | OXYGEN SATURATION: 96 % | DIASTOLIC BLOOD PRESSURE: 78 MMHG | HEART RATE: 79 BPM | BODY MASS INDEX: 43.95 KG/M2 | SYSTOLIC BLOOD PRESSURE: 134 MMHG

## 2024-04-02 DIAGNOSIS — R76.8 POSITIVE ANA (ANTINUCLEAR ANTIBODY): Primary | ICD-10-CM

## 2024-04-02 DIAGNOSIS — R76.8 RHEUMATOID FACTOR POSITIVE: ICD-10-CM

## 2024-04-02 DIAGNOSIS — R76.8 POSITIVE ANA (ANTINUCLEAR ANTIBODY): ICD-10-CM

## 2024-04-02 DIAGNOSIS — M15.9 GENERALIZED OA: ICD-10-CM

## 2024-04-02 DIAGNOSIS — N18.30 TYPE 2 DIABETES MELLITUS WITH STAGE 3 CHRONIC KIDNEY DISEASE, WITH LONG-TERM CURRENT USE OF INSULIN, UNSPECIFIED WHETHER STAGE 3A OR 3B CKD (H): ICD-10-CM

## 2024-04-02 DIAGNOSIS — M25.50 POLYARTHRALGIA: ICD-10-CM

## 2024-04-02 DIAGNOSIS — D64.9 LOW HEMOGLOBIN: ICD-10-CM

## 2024-04-02 DIAGNOSIS — Z79.4 TYPE 2 DIABETES MELLITUS WITH STAGE 3 CHRONIC KIDNEY DISEASE, WITH LONG-TERM CURRENT USE OF INSULIN, UNSPECIFIED WHETHER STAGE 3A OR 3B CKD (H): ICD-10-CM

## 2024-04-02 DIAGNOSIS — R76.8 CENTROMERE ANTIBODY POSITIVE: ICD-10-CM

## 2024-04-02 DIAGNOSIS — E11.22 TYPE 2 DIABETES MELLITUS WITH STAGE 3 CHRONIC KIDNEY DISEASE, WITH LONG-TERM CURRENT USE OF INSULIN, UNSPECIFIED WHETHER STAGE 3A OR 3B CKD (H): ICD-10-CM

## 2024-04-02 LAB
ALBUMIN SERPL BCG-MCNC: 4.3 G/DL (ref 3.5–5.2)
ALP SERPL-CCNC: 85 U/L (ref 40–150)
ALT SERPL W P-5'-P-CCNC: 17 U/L (ref 0–50)
ANION GAP SERPL CALCULATED.3IONS-SCNC: 9 MMOL/L (ref 7–15)
AST SERPL W P-5'-P-CCNC: 18 U/L (ref 0–45)
BASOPHILS # BLD AUTO: 0 10E3/UL (ref 0–0.2)
BASOPHILS NFR BLD AUTO: 0 %
BILIRUB SERPL-MCNC: 0.3 MG/DL
BUN SERPL-MCNC: 16.6 MG/DL (ref 8–23)
CALCIUM SERPL-MCNC: 9.3 MG/DL (ref 8.8–10.2)
CHLORIDE SERPL-SCNC: 107 MMOL/L (ref 98–107)
CREAT SERPL-MCNC: 1.17 MG/DL (ref 0.51–0.95)
CRP SERPL-MCNC: <3 MG/L
DEPRECATED HCO3 PLAS-SCNC: 25 MMOL/L (ref 22–29)
EGFRCR SERPLBLD CKD-EPI 2021: 52 ML/MIN/1.73M2
EOSINOPHIL # BLD AUTO: 0.1 10E3/UL (ref 0–0.7)
EOSINOPHIL NFR BLD AUTO: 2 %
ERYTHROCYTE [DISTWIDTH] IN BLOOD BY AUTOMATED COUNT: 14.3 % (ref 10–15)
GLUCOSE SERPL-MCNC: 107 MG/DL (ref 70–99)
HCT VFR BLD AUTO: 30.9 % (ref 35–47)
HGB BLD-MCNC: 9.8 G/DL (ref 11.7–15.7)
IMM GRANULOCYTES # BLD: 0 10E3/UL
IMM GRANULOCYTES NFR BLD: 0 %
LYMPHOCYTES # BLD AUTO: 1.4 10E3/UL (ref 0.8–5.3)
LYMPHOCYTES NFR BLD AUTO: 24 %
MCH RBC QN AUTO: 31 PG (ref 26.5–33)
MCHC RBC AUTO-ENTMCNC: 31.7 G/DL (ref 31.5–36.5)
MCV RBC AUTO: 98 FL (ref 78–100)
MONOCYTES # BLD AUTO: 0.5 10E3/UL (ref 0–1.3)
MONOCYTES NFR BLD AUTO: 9 %
NEUTROPHILS # BLD AUTO: 3.7 10E3/UL (ref 1.6–8.3)
NEUTROPHILS NFR BLD AUTO: 65 %
PLATELET # BLD AUTO: 184 10E3/UL (ref 150–450)
POTASSIUM SERPL-SCNC: 4.1 MMOL/L (ref 3.4–5.3)
PROT SERPL-MCNC: 7.3 G/DL (ref 6.4–8.3)
RBC # BLD AUTO: 3.16 10E6/UL (ref 3.8–5.2)
SODIUM SERPL-SCNC: 141 MMOL/L (ref 135–145)
WBC # BLD AUTO: 5.7 10E3/UL (ref 4–11)

## 2024-04-02 PROCEDURE — 86481 TB AG RESPONSE T-CELL SUSP: CPT

## 2024-04-02 PROCEDURE — 87340 HEPATITIS B SURFACE AG IA: CPT

## 2024-04-02 PROCEDURE — 85025 COMPLETE CBC W/AUTO DIFF WBC: CPT

## 2024-04-02 PROCEDURE — G0463 HOSPITAL OUTPT CLINIC VISIT: HCPCS | Performed by: INTERNAL MEDICINE

## 2024-04-02 PROCEDURE — 86140 C-REACTIVE PROTEIN: CPT

## 2024-04-02 PROCEDURE — 36415 COLL VENOUS BLD VENIPUNCTURE: CPT

## 2024-04-02 PROCEDURE — 99000 SPECIMEN HANDLING OFFICE-LAB: CPT

## 2024-04-02 PROCEDURE — 80053 COMPREHEN METABOLIC PANEL: CPT

## 2024-04-02 PROCEDURE — 82985 ASSAY OF GLYCATED PROTEIN: CPT | Mod: 90

## 2024-04-02 PROCEDURE — 99214 OFFICE O/P EST MOD 30 MIN: CPT | Performed by: INTERNAL MEDICINE

## 2024-04-02 ASSESSMENT — PAIN SCALES - GENERAL: PAINLEVEL: EXTREME PAIN (8)

## 2024-04-02 NOTE — LETTER
4/2/2024       RE: Nena Tang  [address] [place] [address]     Dear Colleague,    Thank you for referring your patient, Nena Tang, to the [facility] RHEUMATOLOGY at [facility]. Please see a copy of my visit note below.                       4/2/2024      Chief Complaint/Reason for Visit: pos AIDA.     HPI:    Nena Tang is a 63 year old female with past medical history listed below. Today she is c/o having right lower quadrant abdominal pain. Denied having chills, fever, urinary urgency or blood in urine. Her caretaker at the facility accompanied her and was present in the room with her. Denied having joint pain or swelling.     REVIEW OF SYSTEMS    General - neg for fever or chills  HEENT - neg for dry mouth and skin, neg for dry eyes  Cardiovascular - neg for chest pain or sob.   Respiratory - neg for cough or sob   Gastrointestinal - neg for blood in stool  Genitourinary - neg for blood in urine   Skin - neg for skin rashes       Past Medical History:   Diagnosis Date    Acute respiratory failure with hypoxia (H) 09/04/2017    CAD (coronary artery disease)     5/2014 cath, nonbostructive stenosis to LAD, RCA.    Chronic low back pain 01/22/2013    Cocaine abuse, in remission (H)     Fecal urgency 03/08/2012    History of heroin abuse (H)     Hyperlipidemia LDL goal <100 10/31/2010    Hypertension 07/29/2013    Illiterate 08/30/2011    Irritable bowel syndrome     Left cataract     Migraine 04/19/2012    Migraine headache 04/22/2013    Moderate major depression (H) 06/08/2011    Noncompliance with medication regimen 06/08/2011    Obesity     CINDY (obstructive sleep apnea) 03/08/2012    uses CPAP    CINDY (obstructive sleep apnea)- mild AHI 10.3     Osteopenia 10/07/2009    Pneumonia     Pneumonia of right lower lobe due to infectious organism 09/04/2017    Schizoaffective disorder, depressive type (H) 02/25/2013     Sepsis (H) 08/29/2017    Suicidal intent 10/02/2013    Takotsubo cardiomyopathy     Type 2 diabetes mellitus (H) 08/30/2011    Uterine cancer (H) 1983    Verbal auditory hallucination 10/04/2012     Past Surgical History:   Procedure Laterality Date    CATARACT IOL, RT/LT Bilateral 2017    CHOLECYSTECTOMY      COLONOSCOPY N/A 3/16/2017    Procedure: COLONOSCOPY;  Surgeon: Traci Gonzalez MD;  Location:  GI    Coronary CTA  5/21/2014    HYSTERECTOMY  1983    uterine cancer yearly pap's per provider.    HYSTERECTOMY      LAPAROSCOPIC CHOLECYSTECTOMY  2008    PHACOEMULSIFICATION CLEAR CORNEA WITH STANDARD INTRAOCULAR LENS IMPLANT Left 5/5/2017    Procedure: PHACOEMULSIFICATION CLEAR CORNEA WITH STANDARD INTRAOCULAR LENS IMPLANT;  LEFT EYE PHACOEMULSIFICATION CLEAR CORNEA WITH STANDARD INTRAOCULAR LENS IMPLANT ;  Surgeon: Tyra Diaz MD;  Location:  EC    PHACOEMULSIFICATION CLEAR CORNEA WITH STANDARD INTRAOCULAR LENS IMPLANT Right 6/30/2017    Procedure: PHACOEMULSIFICATION CLEAR CORNEA WITH STANDARD INTRAOCULAR LENS IMPLANT;  RIGHT EYE PHACOEMULSIFICATION CLEAR CORNEA WITH STANDARD INTRAOCULAR LENS IMPLANT;  Surgeon: Tyra Diaz MD;  Location:  EC    RELEASE TRIGGER FINGER  10/11/2012    Left thumb. Procedure: RELEASE TRIGGER FINGER;  LEFT THUMB TRIGGER RELEASE;  Surgeon: Tay Langley MD;  Location:  SD    RELEASE TRIGGER FINGER Right 12/26/2016    Procedure: RELEASE TRIGGER FINGER;  Surgeon: Albino Castañeda MD;  Location:  OR    [facility] OOPHORECTORMY FOR RAHEL, W/BX  1983    UTERINE     Family History   Problem Relation Age of Onset    Cancer Mother         BLADDER    Respiratory Mother         COPD    Gastrointestinal Disease Mother         CIRRHOSIS OF LI BOLIVAR    Alcohol/Drug Mother     Diabetes Mother     Hypertension Mother     Lipids Mother     C.A.D. Mother     Glaucoma Mother     Alcohol/Drug Sister     Mental Illness Sister     Alcohol/Drug Sister     Psychotic Disorder  Sister     Cancer Maternal Grandmother         UNKNOWN TYPE    Cancer Brother         COLON    Cancer - colorectal Brother         IN HIS LATE 30S    Alcohol/Drug Brother          OF HEROIN OVERDOSE AT AGE 22 YRS    Macular Degeneration No family hx of      Social History     Socioeconomic History    Marital status:      Spouse name: None    Number of children: 2    Years of education: None    Highest education level: None   Tobacco Use    Smoking status: Never    Smokeless tobacco: Never   Vaping Use    Vaping Use: Never used   Substance and Sexual Activity    Alcohol use: Not Currently     Comment: when younger    Drug use: No     Comment: history of    Sexual activity: Not Currently     Partners: Male     Birth control/protection: None, Condom   Other Topics Concern     Service No    Blood Transfusions No    Caffeine Concern No    Occupational Exposure No    Hobby Hazards No    Sleep Concern Yes    Stress Concern Yes    Weight Concern Yes    Special Diet Yes     Comment: DM    Back Care Yes    Exercise Yes     Comment: WALKS DAILY    Seat Belt Yes    Self-Exams No     Comment: ENCOURAGED   Social History Narrative     10/2014. Has 2 sons. 7 grandchildren.         Unemployed. Graduated HS.         Tobacco use: Denies    Alcohol use: Escalated use since   10/2014    Drug: Denies     Social Determinants of Health     Financial Resource Strain: Low Risk  (10/3/2023)    Financial Resource Strain     Within the past 12 months, have you or your family members you live with been unable to get utilities (heat, electricity) when it was really needed?: No   Food Insecurity: Low Risk  (10/3/2023)    Food Insecurity     Within the past 12 months, did you worry that your food would run out before you got money to buy more?: No     Within the past 12 months, did the food you bought just not last and you didn t have money to get more?: No   Transportation Needs: Low Risk  (10/3/2023)     Transportation Needs     Within the past 12 months, has lack of transportation kept you from medical appointments, getting your medicines, non-medical meetings or appointments, work, or from getting things that you need?: No   Interpersonal Safety: Not At Risk (8/25/2022)    Humiliation, Afraid, Rape, and Kick questionnaire     Fear of Current or Ex-Partner: No     Emotionally Abused: No     Physically Abused: No     Sexually Abused: No   Housing Stability: Low Risk  (10/3/2023)    Housing Stability     Do you have housing? : Yes     Are you worried about losing your housing?: No       Allergies   Allergen Reactions    Imidazole Antifungals Hives     Tolerates diflucan    Ketoprofen Itching     Pruritis to topical    Lisinopril Hives    Metformin Other (See Comments)     Patient hospitalized for lactic acidosis - admitting provider suspectd caused by metformin    Metronidazole Hives    Posaconazole Hives     Tolerates diflucan       Current Outpatient Medications   Medication Sig Dispense Refill    acetaminophen (TYLENOL) 500 MG tablet Take 1,000 mg by mouth every 8 hours as needed for mild pain      albuterol (PROAIR HFA/PROVENTIL HFA/VENTOLIN HFA) 108 (90 Base) MCG/ACT inhaler Inhale 2 puffs into the lungs every 6 hours as needed for shortness of breath, wheezing or cough 18 g 3    Alcohol Swabs (ALCOHOL PREP) 70 % PADS APPLY 1 UNITS TOPICALLY 8 TIMES DAILY 200 each 3    alendronate (FOSAMAX) 70 MG tablet Take 1 tablet (70 mg) by mouth every 7 days 4 tablet 11    amantadine (SYMMETREL) 100 MG capsule TAKE 1 CAPSULE BY MOUTH DAILY 30 capsule 11    amLODIPine (NORVASC) 10 MG tablet Take 1 tablet (10 mg) by mouth every morning 30 tablet 11    aspirin (ASPIRIN LOW DOSE) 81 MG EC tablet Take 1 tablet (81 mg) by mouth daily 28 tablet 11    atorvastatin (LIPITOR) 80 MG tablet TAKE 1 TABLET BY MOUTH DAILY 90 tablet 3    blood glucose (NO BRAND SPECIFIED) test strip Use to test blood sugar 10 times daily or as directed.  100 strip 11    calcium carbonate (OS-ALLEN) 1500 (600 Ca) MG tablet TAKE 1 TABLET BY MOUTH DAILY 90 tablet 3    chlorhexidine (PERIDEX) 0.12 % solution Swish and spit 10 mLs in mouth 2 times daily 1893 mL 3    clonazePAM (KLONOPIN) 0.5 MG tablet TAKE 1 TABLET BY MOUTH EVERY NIGHT AS NEEDED FOR ANXIETY 28 tablet 5    Continuous Blood Gluc Sensor (DEXCOM G6 SENSOR) MISC Change every 10 days. 9 each 2    Continuous Blood Gluc Transmit (DEXCOM G6 TRANSMITTER) MISC Change every 3 months. 1 each 1    diclofenac (VOLTAREN) 1 % topical gel Apply 4 grams to painful area at bedtime. May use an additional 3 doses during the day as needed 100 g 1    gabapentin (NEURONTIN) 300 MG capsule Take 1 capsule (300 mg) by mouth at bedtime 28 capsule 11    HUMALOG KWIKPEN 100 UNIT/ML soln Inject 0-12 Units Subcutaneous at bedtime BEDTIME-If >4 hours since last Humalog injection For  - 249 give 3 units. For  - 299 give 6 units. For  - 349 give 9 units. For BG = or > 350 give 12 units. 15 mL 1    hydrOXYzine (VISTARIL) 25 MG capsule TAKE 1 CAPSULE BY MOUTH EVERY NIGHT AT BEDTIME ANXIETY/SLEEP 28 capsule 0    ibuprofen (ADVIL/MOTRIN) 200 MG tablet Take 200 mg by mouth every 4 hours as needed for pain      insulin glargine (LANTUS SOLOSTAR) 100 UNIT/ML pen Inject 22 Units Subcutaneous 2 times daily 45 mL 1    insulin pen needle (NOVOFINE AUTOCOVER PEN NEEDLE) 30G X 8 MM miscellaneous USE 6 DAILY OR AS DIRECTED 200 each 0    Lancets (ONETOUCH DELICA PLUS [id number]) MISC 1 EACH AS NEEDED  USE TO TEST BLOOD SUGARS 1-2 TIMES DAILY AS DIRECTED 100 each 1    leflunomide (ARAVA) 20 MG tablet Take 1 tablet (20 mg) by mouth daily Labs every 8-12 weeks 90 tablet 1    Lidocaine (LIDOCAINE PAIN RELIEF) 4 % Patch APPLY 1 PATCH ONTO SKIN EVERY 24 HOURS ; APPLY AT NIGHT AND REMOVE IN THE MORNING ( TWELVE HOURS PATCH FREE) 30 patch 11    loperamide (IMODIUM A-D) 2 MG tablet Take 1 tablet (2 mg) by mouth 4 times daily as needed for diarrhea  30 tablet 0    losartan (COZAAR) 100 MG tablet TAKE 1 TABLET BY MOUTH DAILY 28 tablet 11    Magnesium Oxide 250 MG TABS TAKE 1 TABLET BY MOUTH AT BEDTIME 28 tablet 11    melatonin 5 MG tablet Take 5 mg by mouth at bedtime      metoprolol succinate ER (TOPROL XL) 50 MG 24 hr tablet TAKE 1 TABLET BY MOUTH IN THE MORNING AND TAKE 2 TABLETS AT BEDTIME 84 tablet 11    mirabegron (MYRBETRIQ) 50 MG 24 hr tablet Take 1 tablet (50 mg) by mouth daily 90 tablet 4    naproxen (NAPROSYN) 375 MG tablet Take 1 tablet (375 mg) by mouth 2 times daily as needed for moderate pain (denatl pain) 60 tablet 11    nystatin (MYCOSTATIN) 162554 UNIT/GM external powder APPLY TOPICALLY TWICE A DAY AS NEEDED 60 g 0    ondansetron (ZOFRAN) 4 MG tablet TAKE 1 TABLET BY MOUTH EVERY 8 HOURS AS NEEDED FOR NAUSEA 10 tablet 1    order for DME Equipment being ordered: Depends. 30 each 4    order for DME Equipment being ordered: CPAP Supplies. 1 each 0    paliperidone ER (INVEGA) 6 MG 24 hr tablet TAKE 2 TABLETS BY MOUTH EVERY NIGHT AT BEDTIME 60 tablet 11    pantoprazole (PROTONIX) 40 MG EC tablet TAKE 1 TABLET (40 MG) BY MOUTH DAILY 28 tablet 11    prazosin (MINIPRESS) 1 MG capsule Take 1 mg by mouth      QUEtiapine (SEROQUEL) 25 MG tablet TAKE 5 TABLETS BY MOUTH EVERY NIGHT AT BEDTIME 150 tablet 4    senna-docusate (STIMULANT LAXATIVE) 8.6-50 MG tablet TAKE 1 TABLET BY MOUTH DAILY AND EXTRA 1 TABLET AS NEEDED FOR CONSTIPATION 60 tablet 1    sertraline (ZOLOFT) 50 MG tablet Take 50 mg by mouth daily      tirzepatide (MOUNJARO) 2.5 MG/0.5ML pen Inject 2.5 mg Subcutaneous every 7 days 2 mL 11    tirzepatide (MOUNJARO) 5 MG/0.5ML pen Inject 5 mg Subcutaneous every 7 days 2 mL 1    traZODone (DESYREL) 100 MG tablet TAKE 1 TABLET BY MOUTH AT BEDTIME 28 tablet 11    vitamin C (ASCORBIC ACID) 500 MG tablet TAKE 2 TABLETS BY MOUTH IN THE MORNING AND TAKE 2 TABLETS BY MOUTH IN THE EVENING 112 tablet 4    zinc oxide (DESITIN) 20 % external ointment Apply  topically 3 times daily as needed for irritation (barrier cream) 60 g 3     Current Facility-Administered Medications   Medication Dose Route Frequency Provider Last Rate Last Admin    apraclonidine (IOPIDINE) 1 % ophthalmic solution 1 drop  1 drop Both Eyes Once Tiburcio Chacon MD        lidocaine 1 % injection 4 mL  4 mL   Luiz Hernandez DO   4 mL at 06/22/23 1400    triamcinolone (KENALOG-40) injection 40 mg  40 mg   Luiz Hernandez DO   40 mg at 06/22/23 1400       PHYSICAL EXAM    /78 (BP Location: Right arm, Patient Position: Sitting, Cuff Size: Adult Large)   Pulse 79   Wt 103.6 kg (228 lb 4.8 oz)   LMP 01/06/2015 (Exact Date)   SpO2 96%   BMI 43.95 kg/m      General: Alert, No apparent distressplace, and time  Psych: Affect euthymic  Eyes: Sclera noninjected  Skin: No rashes noted  Cardiovascular: Regular rate and rhythm, Normal S1 and S2 and No murmurs, rubs or gallops  Respiratory: Clear to auscultation with no wheezing or crackles  Neuro: Normal gait and Able to arise from seated position unassisted  Musculoskeletal: Hands:   She does have puffiness of right and left 2nd-4th MCP with tenderness of left MCPs.   GI - abdomen non tender   Wrists:  Normal.  Elbows:  Normal.  Shoulders:  Normal.  Feet:  Normal.  Ankles:  bilateral pitting edema  Knees:  Normal.  Hips:  Normal.  Spine:  Normal.  Tender points:  Negative.    LABS   Reviewed as below.     Jan 2024  CBC - low Hb 9.4, platelets low at 127  Creatinine normal   AST, ALT normal     July and Sept 2023  CBC - Low Hb 10.9, normal platelet and wbc counts  BMP - creatinine 1.28  CK, folate, vitamin B12, TSH, Vit D, LFT  normal   C4 - 42 and C3 161 (elevated)  Anti sm, mitochondrial,RNP, SCL 70, ds dna neg  Centromere pos   AIDA pos 1:320 centromere   RF 13  CCP neg     June 2023  ESR, CRP normal      Latest Reference Range & Units 07/13/16 13:50 09/06/18 12:01   Hepatitis C Antibody NR  Nonreactive   Assay performance  characteristics have not been established for newborns,   infants, and children      HIV Antigen Antibody Combo NR^Nonreactive      Nonreactive         EXAM: CT CHEST PE ABDOMEN PELVIS W CONTRAST  LOCATION: [facility]  DATE: 8/17/2023     INDICATION: right sided flank pain, pain with breatihng. evaluate for PE, kidney stone  COMPARISON: CT chest 6/27/23. CT abdomen and pelvis 6/25/23.  TECHNIQUE: CT chest pulmonary angiogram and routine CT abdomen pelvis with IV contrast. Arterial phase through the chest and venous phase through the abdomen and pelvis. Multiplanar reformats and MIP reconstructions were performed. Dose reduction   techniques were used.   CONTRAST: 122 mL Isovue   370     FINDINGS:  ANGIOGRAM CHEST: No pulmonary embolus. No aortic dissection or aneurysm.     LUNGS AND PLEURA: Normal.     MEDIASTINUM/AXILLAE: Normal.     CORONARY ARTERY CALCIFICATION: Moderate.      HEPATOBILIARY: Cholecystectomy.     PANCREAS: Normal.     SPLEEN: Normal.     ADRENAL GLANDS: Normal.     KIDNEYS/BLADDER: Normal.     BOWEL: Normal. No obstruction or inflammation. Normal appendix.     LYMPH NODES: Normal.     VASCULATURE: Mild to moderate aortoiliac atherosclerosis.     PELVIC ORGANS: Hysterectomy.     MUSCULOSKELETAL: Mild degenerative changes of the spine.                                                                      IMPRESSION:  No acute process in the chest, abdomen or pelvis.      KNEE BILATERAL THREE VIEWS  6/22/2023 2:01 PM      HISTORY: Bilateral knee pain.     COMPARISON: None.                                                                       IMPRESSION:  Mild patellofemoral compartment degenerative change on  both sides. There is no evidence of an acute fracture. Joint effusion  is not assessed due to obliquity of the lateral views. Atherosclerotic  vascular calcifications.     TTE  Interpretation Summary     Left ventricular systolic function is normal.  The visual ejection  fraction is estimated at 60-65%.  Right ventricular systolic pressure is elevated, consistent with mild  pulmonary hypertension. This is new compared to 2014.  The study was technically difficult.       ASSESSMENT      1. Positive AIDA (antinuclear antibody)    2. Centromere antibody positive    3. Low hemoglobin    4. Generalized OA    5. Polyarthralgia    6. Rheumatoid factor positive        (R76.8) Positive AIDA (antinuclear antibody)  (primary encounter diagnosis)  (R76.8) Centromere antibody positive  Comment: AIDA pos 1:320 centromere.Anti sm, mitochondrial,RNP, SCL 70, ds dna neg. RF 13. CCP neg.Today she reports swelling of hands and feet associated with pain. ROS neg for raynaud's, difficulty swallowing, oral ulcers. She was noted to have puffiness of right and left 2nd-4th MCP tenderness. She was on leflunomide in the past which was later stopped by  as he thought her problems were secondary to mechanical causes.Cannot use HCQ due to diabetic retinopathy. CT chest in 2023 did not reveal features of ILD. Will avoid methotrexate due to underlying CKD. Restarted leflunomide in Dec 2023. Today on exam, she continues to have MCP puffiness.   Plan:   Continue leflunomide 20 mg daily.   Check labs.   Orders Placed This Encounter   Procedures    Comprehensive metabolic panel    CRP inflammation    Hepatitis B core antibody    Hepatitis B Surface Antibody    Hepatitis B surface antigen    CBC with platelets differential    Quantiferon TB Gold Plus       (D64.9) Low hemoglobin  Comment: noted  Plan: ref to heme     (M15.9) Generalized OA  Comment: OA of knees and hands   Plan: may use tylenol not to exceed 2 g in 24 hours as needed     (M25.50) Polyarthralgia  (R76.8) Rheumatoid factor positive  Comment: RF 13, CCP neg. She does have swelling of MCPs on exam which makes me think she does have an underlying inflammatory arthropathy.She was on leflunomide in the past which was later stopped by  as he  thought her problems were secondary to mechanical causes.Cannot use HCQ due to diabetic retinopathy. CT chest in 2023 did not reveal features of ILD. Will avoid methotrexate due to underlying CKD.   Plan: as above     RTC - 12 weeks     I spent 24 minutes on the date of the encounter doing chart review, history and exam, documentation and orders per the note.      JEWEL DIOP MD    Division of Rheumatic & Autoimmune Diseases  [facility]

## 2024-04-02 NOTE — NURSING NOTE
Chief Complaint   Patient presents with    RECHECK     /78 (BP Location: Right arm, Patient Position: Sitting, Cuff Size: Adult Large)   Pulse 79   Wt 103.6 kg (228 lb 4.8 oz)   LMP 01/06/2015 (Exact Date)   SpO2 96%   BMI 43.95 kg/m    [facility] Main [facility]

## 2024-04-02 NOTE — PROGRESS NOTES
4/2/2024      Chief Complaint/Reason for Visit: pos AIDA.     HPI:    Nena Tang is a 63 year old female with past medical history listed below. Today she is c/o having right lower quadrant abdominal pain. Denied having chills, fever, urinary urgency or blood in urine. Her caretaker at the facility accompanied her and was present in the room with her. Denied having joint pain or swelling.     REVIEW OF SYSTEMS    General - neg for fever or chills  HEENT - neg for dry mouth and skin, neg for dry eyes  Cardiovascular - neg for chest pain or sob.   Respiratory - neg for cough or sob   Gastrointestinal - neg for blood in stool  Genitourinary - neg for blood in urine   Skin - neg for skin rashes       Past Medical History:   Diagnosis Date    Acute respiratory failure with hypoxia (H) 09/04/2017    CAD (coronary artery disease)     5/2014 cath, nonbostructive stenosis to LAD, RCA.    Chronic low back pain 01/22/2013    Cocaine abuse, in remission (H)     Fecal urgency 03/08/2012    History of heroin abuse (H)     Hyperlipidemia LDL goal <100 10/31/2010    Hypertension 07/29/2013    Illiterate 08/30/2011    Irritable bowel syndrome     Left cataract     Migraine 04/19/2012    Migraine headache 04/22/2013    Moderate major depression (H) 06/08/2011    Noncompliance with medication regimen 06/08/2011    Obesity     CINDY (obstructive sleep apnea) 03/08/2012    uses CPAP    CINDY (obstructive sleep apnea)- mild AHI 10.3     Osteopenia 10/07/2009    Pneumonia     Pneumonia of right lower lobe due to infectious organism 09/04/2017    Schizoaffective disorder, depressive type (H) 02/25/2013    Sepsis (H) 08/29/2017    Suicidal intent 10/02/2013    Takotsubo cardiomyopathy     Type 2 diabetes mellitus (H) 08/30/2011    Uterine cancer (H) 1983    Verbal auditory hallucination 10/04/2012     Past Surgical History:   Procedure Laterality Date    CATARACT IOL, RT/LT Bilateral 2017    CHOLECYSTECTOMY       COLONOSCOPY N/A 3/16/2017    Procedure: COLONOSCOPY;  Surgeon: Traci Gonzalez MD;  Location:  GI    Coronary CTA  2014    HYSTERECTOMY      uterine cancer yearly pap's per provider.    HYSTERECTOMY      LAPAROSCOPIC CHOLECYSTECTOMY      PHACOEMULSIFICATION CLEAR CORNEA WITH STANDARD INTRAOCULAR LENS IMPLANT Left 2017    Procedure: PHACOEMULSIFICATION CLEAR CORNEA WITH STANDARD INTRAOCULAR LENS IMPLANT;  LEFT EYE PHACOEMULSIFICATION CLEAR CORNEA WITH STANDARD INTRAOCULAR LENS IMPLANT ;  Surgeon: Tyra Diaz MD;  Location:  [facility]    PHACOEMULSIFICATION CLEAR CORNEA WITH STANDARD INTRAOCULAR LENS IMPLANT Right 2017    Procedure: PHACOEMULSIFICATION CLEAR CORNEA WITH STANDARD INTRAOCULAR LENS IMPLANT;  RIGHT EYE PHACOEMULSIFICATION CLEAR CORNEA WITH STANDARD INTRAOCULAR LENS IMPLANT;  Surgeon: Tyra Diaz MD;  Location:  [facility]    RELEASE TRIGGER FINGER  10/11/2012    Left thumb. Procedure: RELEASE TRIGGER FINGER;  LEFT THUMB TRIGGER RELEASE;  Surgeon: Tay Langley MD;  Location:  [facility]    RELEASE TRIGGER FINGER Right 2016    Procedure: RELEASE TRIGGER FINGER;  Surgeon: Albino Castañeda MD;  Location:  OR    [facility] OOPHORECTORMY FOR MALIG, W/BX      UTERINE     Family History   Problem Relation Age of Onset    Cancer Mother         BLADDER    Respiratory Mother         COPD    Gastrointestinal Disease Mother         CIRRHOSIS OF LI BOLIVAR    Alcohol/Drug Mother     Diabetes Mother     Hypertension Mother     Lipids Mother     C.A.D. Mother     Glaucoma Mother     Alcohol/Drug Sister     Mental Illness Sister     Alcohol/Drug Sister     Psychotic Disorder Sister     Cancer Maternal Grandmother         UNKNOWN TYPE    Cancer Brother         COLON    Cancer - colorectal Brother         IN HIS LATE 30S    Alcohol/Drug Brother          OF HEROIN OVERDOSE AT AGE 22 YRS    Macular Degeneration No family hx of      Social History     Socioeconomic History     Marital status:      Spouse name: None    Number of children: 2    Years of education: None    Highest education level: None   Tobacco Use    Smoking status: Never    Smokeless tobacco: Never   Vaping Use    Vaping Use: Never used   Substance and Sexual Activity    Alcohol use: Not Currently     Comment: when younger    Drug use: No     Comment: history of    Sexual activity: Not Currently     Partners: Male     Birth control/protection: None, Condom   Other Topics Concern     Service No    Blood Transfusions No    Caffeine Concern No    Occupational Exposure No    Hobby Hazards No    Sleep Concern Yes    Stress Concern Yes    Weight Concern Yes    Special Diet Yes     Comment: DM    Back Care Yes    Exercise Yes     Comment: WALKS DAILY    Seat Belt Yes    Self-Exams No     Comment: ENCOURAGED   Social History Narrative     10/2014. Has 2 sons. 7 grandchildren.         Unemployed. Graduated HS.         Tobacco use: Denies    Alcohol use: Escalated use since   10/2014    Drug: Denies     Social Determinants of Health     Financial Resource Strain: Low Risk  (10/3/2023)    Financial Resource Strain     Within the past 12 months, have you or your family members you live with been unable to get utilities (heat, electricity) when it was really needed?: No   Food Insecurity: Low Risk  (10/3/2023)    Food Insecurity     Within the past 12 months, did you worry that your food would run out before you got money to buy more?: No     Within the past 12 months, did the food you bought just not last and you didn t have money to get more?: No   Transportation Needs: Low Risk  (10/3/2023)    Transportation Needs     Within the past 12 months, has lack of transportation kept you from medical appointments, getting your medicines, non-medical meetings or appointments, work, or from getting things that you need?: No   Interpersonal Safety: Not At Risk (2022)    Humiliation, Afraid, Rape, and  Kick questionnaire     Fear of Current or Ex-Partner: No     Emotionally Abused: No     Physically Abused: No     Sexually Abused: No   Housing Stability: Low Risk  (10/3/2023)    Housing Stability     Do you have housing? : Yes     Are you worried about losing your housing?: No       Allergies   Allergen Reactions    Imidazole Antifungals Hives     Tolerates diflucan    Ketoprofen Itching     Pruritis to topical    Lisinopril Hives    Metformin Other (See Comments)     Patient hospitalized for lactic acidosis - admitting provider suspectd caused by metformin    Metronidazole Hives    Posaconazole Hives     Tolerates diflucan       Current Outpatient Medications   Medication Sig Dispense Refill    acetaminophen (TYLENOL) 500 MG tablet Take 1,000 mg by mouth every 8 hours as needed for mild pain      albuterol (PROAIR HFA/PROVENTIL HFA/VENTOLIN HFA) 108 (90 Base) MCG/ACT inhaler Inhale 2 puffs into the lungs every 6 hours as needed for shortness of breath, wheezing or cough 18 g 3    Alcohol Swabs (ALCOHOL PREP) 70 % PADS APPLY 1 UNITS TOPICALLY 8 TIMES DAILY 200 each 3    alendronate (FOSAMAX) 70 MG tablet Take 1 tablet (70 mg) by mouth every 7 days 4 tablet 11    amantadine (SYMMETREL) 100 MG capsule TAKE 1 CAPSULE BY MOUTH DAILY 30 capsule 11    amLODIPine (NORVASC) 10 MG tablet Take 1 tablet (10 mg) by mouth every morning 30 tablet 11    aspirin (ASPIRIN LOW DOSE) 81 MG EC tablet Take 1 tablet (81 mg) by mouth daily 28 tablet 11    atorvastatin (LIPITOR) 80 MG tablet TAKE 1 TABLET BY MOUTH DAILY 90 tablet 3    blood glucose (NO BRAND SPECIFIED) test strip Use to test blood sugar 10 times daily or as directed. 100 strip 11    calcium carbonate (OS-ALLEN) 1500 (600 Ca) MG tablet TAKE 1 TABLET BY MOUTH DAILY 90 tablet 3    chlorhexidine (PERIDEX) 0.12 % solution Swish and spit 10 mLs in mouth 2 times daily 1893 mL 3    clonazePAM (KLONOPIN) 0.5 MG tablet TAKE 1 TABLET BY MOUTH EVERY NIGHT AS NEEDED FOR ANXIETY 28  tablet 5    Continuous Blood Gluc Sensor (DEXCOM G6 SENSOR) MISC Change every 10 days. 9 each 2    Continuous Blood Gluc Transmit (DEXCOM G6 TRANSMITTER) MISC Change every 3 months. 1 each 1    diclofenac (VOLTAREN) 1 % topical gel Apply 4 grams to painful area at bedtime. May use an additional 3 doses during the day as needed 100 g 1    gabapentin (NEURONTIN) 300 MG capsule Take 1 capsule (300 mg) by mouth at bedtime 28 capsule 11    HUMALOG KWIKPEN 100 UNIT/ML soln Inject 0-12 Units Subcutaneous at bedtime BEDTIME-If >4 hours since last Humalog injection For  - 249 give 3 units. For  - 299 give 6 units. For  - 349 give 9 units. For BG = or > 350 give 12 units. 15 mL 1    hydrOXYzine (VISTARIL) 25 MG capsule TAKE 1 CAPSULE BY MOUTH EVERY NIGHT AT BEDTIME ANXIETY/SLEEP 28 capsule 0    ibuprofen (ADVIL/MOTRIN) 200 MG tablet Take 200 mg by mouth every 4 hours as needed for pain      insulin glargine (LANTUS SOLOSTAR) 100 UNIT/ML pen Inject 22 Units Subcutaneous 2 times daily 45 mL 1    insulin pen needle (NOVOFINE AUTOCOVER PEN NEEDLE) 30G X 8 MM miscellaneous USE 6 DAILY OR AS DIRECTED 200 each 0    Lancets (ONETOUCH DELICA PLUS RXXINR51Q) MISC 1 EACH AS NEEDED  USE TO TEST BLOOD SUGARS 1-2 TIMES DAILY AS DIRECTED 100 each 1    leflunomide (ARAVA) 20 MG tablet Take 1 tablet (20 mg) by mouth daily Labs every 8-12 weeks 90 tablet 1    Lidocaine (LIDOCAINE PAIN RELIEF) 4 % Patch APPLY 1 PATCH ONTO SKIN EVERY 24 HOURS ; APPLY AT NIGHT AND REMOVE IN THE MORNING ( TWELVE HOURS PATCH FREE) 30 patch 11    loperamide (IMODIUM A-D) 2 MG tablet Take 1 tablet (2 mg) by mouth 4 times daily as needed for diarrhea 30 tablet 0    losartan (COZAAR) 100 MG tablet TAKE 1 TABLET BY MOUTH DAILY 28 tablet 11    Magnesium Oxide 250 MG TABS TAKE 1 TABLET BY MOUTH AT BEDTIME 28 tablet 11    melatonin 5 MG tablet Take 5 mg by mouth at bedtime      metoprolol succinate ER (TOPROL XL) 50 MG 24 hr tablet TAKE 1 TABLET BY MOUTH  IN THE MORNING AND TAKE 2 TABLETS AT BEDTIME 84 tablet 11    mirabegron (MYRBETRIQ) 50 MG 24 hr tablet Take 1 tablet (50 mg) by mouth daily 90 tablet 4    naproxen (NAPROSYN) 375 MG tablet Take 1 tablet (375 mg) by mouth 2 times daily as needed for moderate pain (denatl pain) 60 tablet 11    nystatin (MYCOSTATIN) 325223 UNIT/GM external powder APPLY TOPICALLY TWICE A DAY AS NEEDED 60 g 0    ondansetron (ZOFRAN) 4 MG tablet TAKE 1 TABLET BY MOUTH EVERY 8 HOURS AS NEEDED FOR NAUSEA 10 tablet 1    order for DME Equipment being ordered: Depends. 30 each 4    order for DME Equipment being ordered: CPAP Supplies. 1 each 0    paliperidone ER (INVEGA) 6 MG 24 hr tablet TAKE 2 TABLETS BY MOUTH EVERY NIGHT AT BEDTIME 60 tablet 11    pantoprazole (PROTONIX) 40 MG EC tablet TAKE 1 TABLET (40 MG) BY MOUTH DAILY 28 tablet 11    prazosin (MINIPRESS) 1 MG capsule Take 1 mg by mouth      QUEtiapine (SEROQUEL) 25 MG tablet TAKE 5 TABLETS BY MOUTH EVERY NIGHT AT BEDTIME 150 tablet 4    senna-docusate (STIMULANT LAXATIVE) 8.6-50 MG tablet TAKE 1 TABLET BY MOUTH DAILY AND EXTRA 1 TABLET AS NEEDED FOR CONSTIPATION 60 tablet 1    sertraline (ZOLOFT) 50 MG tablet Take 50 mg by mouth daily      tirzepatide (MOUNJARO) 2.5 MG/0.5ML pen Inject 2.5 mg Subcutaneous every 7 days 2 mL 11    tirzepatide (MOUNJARO) 5 MG/0.5ML pen Inject 5 mg Subcutaneous every 7 days 2 mL 1    traZODone (DESYREL) 100 MG tablet TAKE 1 TABLET BY MOUTH AT BEDTIME 28 tablet 11    vitamin C (ASCORBIC ACID) 500 MG tablet TAKE 2 TABLETS BY MOUTH IN THE MORNING AND TAKE 2 TABLETS BY MOUTH IN THE EVENING 112 tablet 4    zinc oxide (DESITIN) 20 % external ointment Apply topically 3 times daily as needed for irritation (barrier cream) 60 g 3     Current Facility-Administered Medications   Medication Dose Route Frequency Provider Last Rate Last Admin    apraclonidine (IOPIDINE) 1 % ophthalmic solution 1 drop  1 drop Both Eyes Once Tiburcio Chacon MD         lidocaine 1 % injection 4 mL  4 mL   Luiz Hernandez, DO   4 mL at 06/22/23 1400    triamcinolone (KENALOG-40) injection 40 mg  40 mg   Luiz Hernandez, DO   40 mg at 06/22/23 1400       PHYSICAL EXAM    /78 (BP Location: Right arm, Patient Position: Sitting, Cuff Size: Adult Large)   Pulse 79   Wt 103.6 kg (228 lb 4.8 oz)   LMP 01/06/2015 (Exact Date)   SpO2 96%   BMI 43.95 kg/m      General: Alert, No apparent distressplace, and time  Psych: Affect euthymic  Eyes: Sclera noninjected  Skin: No rashes noted  Cardiovascular: Regular rate and rhythm, Normal S1 and S2 and No murmurs, rubs or gallops  Respiratory: Clear to auscultation with no wheezing or crackles  Neuro: Normal gait and Able to arise from seated position unassisted  Musculoskeletal: Hands:   She does have puffiness of right and left 2nd-4th MCP with tenderness of left MCPs.   GI - abdomen non tender   Wrists:  Normal.  Elbows:  Normal.  Shoulders:  Normal.  Feet:  Normal.  Ankles:  bilateral pitting edema  Knees:  Normal.  Hips:  Normal.  Spine:  Normal.  Tender points:  Negative.    LABS   Reviewed as below.     Jan 2024  CBC - low Hb 9.4, platelets low at 127  Creatinine normal   AST, ALT normal     July and Sept 2023  CBC - Low Hb 10.9, normal platelet and wbc counts  BMP - creatinine 1.28  CK, folate, vitamin B12, TSH, Vit D, LFT  normal   C4 - 42 and C3 161 (elevated)  Anti sm, mitochondrial,RNP, SCL 70, ds dna neg  Centromere pos   AIDA pos 1:320 centromere   RF 13  CCP neg     June 2023  ESR, CRP normal      Latest Reference Range & Units 07/13/16 13:50 09/06/18 12:01   Hepatitis C Antibody NR  Nonreactive   Assay performance characteristics have not been established for newborns,   infants, and children      HIV Antigen Antibody Combo NR^Nonreactive      Nonreactive         EXAM: CT CHEST PE ABDOMEN PELVIS W CONTRAST  LOCATION: [facility]  DATE: 8/17/2023     INDICATION: right sided flank pain, pain with  breatihng. evaluate for PE, kidney stone  COMPARISON: CT chest 6/27/23. CT abdomen and pelvis 6/25/23.  TECHNIQUE: CT chest pulmonary angiogram and routine CT abdomen pelvis with IV contrast. Arterial phase through the chest and venous phase through the abdomen and pelvis. Multiplanar reformats and MIP reconstructions were performed. Dose reduction   techniques were used.   CONTRAST: 122 mL Isovue   370     FINDINGS:  ANGIOGRAM CHEST: No pulmonary embolus. No aortic dissection or aneurysm.     LUNGS AND PLEURA: Normal.     MEDIASTINUM/AXILLAE: Normal.     CORONARY ARTERY CALCIFICATION: Moderate.      HEPATOBILIARY: Cholecystectomy.     PANCREAS: Normal.     SPLEEN: Normal.     ADRENAL GLANDS: Normal.     KIDNEYS/BLADDER: Normal.     BOWEL: Normal. No obstruction or inflammation. Normal appendix.     LYMPH NODES: Normal.     VASCULATURE: Mild to moderate aortoiliac atherosclerosis.     PELVIC ORGANS: Hysterectomy.     MUSCULOSKELETAL: Mild degenerative changes of the spine.                                                                      IMPRESSION:  No acute process in the chest, abdomen or pelvis.      KNEE BILATERAL THREE VIEWS  6/22/2023 2:01 PM      HISTORY: Bilateral knee pain.     COMPARISON: None.                                                                       IMPRESSION:  Mild patellofemoral compartment degenerative change on  both sides. There is no evidence of an acute fracture. Joint effusion  is not assessed due to obliquity of the lateral views. Atherosclerotic  vascular calcifications.     TTE  Interpretation Summary     Left ventricular systolic function is normal.  The visual ejection fraction is estimated at 60-65%.  Right ventricular systolic pressure is elevated, consistent with mild  pulmonary hypertension. This is new compared to 2014.  The study was technically difficult.       ASSESSMENT      1. Positive AIDA (antinuclear antibody)    2. Centromere antibody positive    3. Low  hemoglobin    4. Generalized OA    5. Polyarthralgia    6. Rheumatoid factor positive        (R76.8) Positive AIDA (antinuclear antibody)  (primary encounter diagnosis)  (R76.8) Centromere antibody positive  Comment: AIDA pos 1:320 centromere.Anti sm, mitochondrial,RNP, SCL 70, ds dna neg. RF 13. CCP neg.Today she reports swelling of hands and feet associated with pain. ROS neg for raynaud's, difficulty swallowing, oral ulcers. She was noted to have puffiness of right and left 2nd-4th MCP tenderness. She was on leflunomide in the past which was later stopped by  as he thought her problems were secondary to mechanical causes.Cannot use HCQ due to diabetic retinopathy. CT chest in 2023 did not reveal features of ILD. Will avoid methotrexate due to underlying CKD. Restarted leflunomide in Dec 2023. Today on exam, she continues to have MCP puffiness.   Plan:   Continue leflunomide 20 mg daily.   Check labs.   Orders Placed This Encounter   Procedures    Comprehensive metabolic panel    CRP inflammation    Hepatitis B core antibody    Hepatitis B Surface Antibody    Hepatitis B surface antigen    CBC with platelets differential    Quantiferon TB Gold Plus       (D64.9) Low hemoglobin  Comment: noted  Plan: ref to heme     (M15.9) Generalized OA  Comment: OA of knees and hands   Plan: may use tylenol not to exceed 2 g in 24 hours as needed     (M25.50) Polyarthralgia  (R76.8) Rheumatoid factor positive  Comment: RF 13, CCP neg. She does have swelling of MCPs on exam which makes me think she does have an underlying inflammatory arthropathy.She was on leflunomide in the past which was later stopped by  as he thought her problems were secondary to mechanical causes.Cannot use HCQ due to diabetic retinopathy. CT chest in 2023 did not reveal features of ILD. Will avoid methotrexate due to underlying CKD.   Plan: as above     RTC - 12 weeks     I spent 24 minutes on the date of the encounter doing chart review,  history and exam, documentation and orders per the note.      JEWEL DIOP MD    Division of Rheumatic & Autoimmune Diseases  [facility]

## 2024-04-03 LAB — FRUCTOSAMINE SERPL-SCNC: 298 UMOL/L

## 2024-04-04 DIAGNOSIS — R76.8 POSITIVE ANA (ANTINUCLEAR ANTIBODY): Primary | ICD-10-CM

## 2024-04-04 LAB
GAMMA INTERFERON BACKGROUND BLD IA-ACNC: 0.01 IU/ML
HBV SURFACE AG SERPL QL IA: NONREACTIVE
M TB IFN-G BLD-IMP: NEGATIVE
M TB IFN-G CD4+ BCKGRND COR BLD-ACNC: 9.99 IU/ML
MITOGEN IGNF BCKGRD COR BLD-ACNC: 0 IU/ML
MITOGEN IGNF BCKGRD COR BLD-ACNC: 0.02 IU/ML
QUANTIFERON MITOGEN: 10 IU/ML
QUANTIFERON NIL TUBE: 0.01 IU/ML
QUANTIFERON TB1 TUBE: 0.03 IU/ML
QUANTIFERON TB2 TUBE: 0.01

## 2024-04-08 ENCOUNTER — TELEPHONE (OUTPATIENT)
Dept: RHEUMATOLOGY | Facility: CLINIC | Age: 64
End: 2024-04-08
Payer: COMMERCIAL

## 2024-04-08 NOTE — TELEPHONE ENCOUNTER
Called pt and notified her that her provider had put in some lab orders to be drawn. Pt stated that she has some labs drawn on Friday.    Priti Ugarte RN  [facility]

## 2024-04-09 ENCOUNTER — OFFICE VISIT (OUTPATIENT)
Dept: FAMILY MEDICINE | Facility: CLINIC | Age: 64
End: 2024-04-09
Attending: FAMILY MEDICINE
Payer: COMMERCIAL

## 2024-04-09 ENCOUNTER — OFFICE VISIT (OUTPATIENT)
Dept: PHARMACY | Facility: CLINIC | Age: 64
End: 2024-04-09
Payer: COMMERCIAL

## 2024-04-09 VITALS
RESPIRATION RATE: 17 BRPM | OXYGEN SATURATION: 95 % | HEIGHT: 61 IN | TEMPERATURE: 97.8 F | BODY MASS INDEX: 43.33 KG/M2 | SYSTOLIC BLOOD PRESSURE: 136 MMHG | WEIGHT: 229.5 LBS | HEART RATE: 81 BPM | DIASTOLIC BLOOD PRESSURE: 74 MMHG

## 2024-04-09 DIAGNOSIS — R76.8 POSITIVE ANA (ANTINUCLEAR ANTIBODY): ICD-10-CM

## 2024-04-09 DIAGNOSIS — R45.851 DEPRESSION WITH SUICIDAL IDEATION: ICD-10-CM

## 2024-04-09 DIAGNOSIS — R73.9 HYPERGLYCEMIA: ICD-10-CM

## 2024-04-09 DIAGNOSIS — Z79.4 TYPE 2 DIABETES MELLITUS WITH MILD NONPROLIFERATIVE RETINOPATHY WITHOUT MACULAR EDEMA, WITH LONG-TERM CURRENT USE OF INSULIN, UNSPECIFIED LATERALITY (H): Primary | ICD-10-CM

## 2024-04-09 DIAGNOSIS — M25.551 HIP PAIN, RIGHT: Primary | ICD-10-CM

## 2024-04-09 DIAGNOSIS — E11.3299 TYPE 2 DIABETES MELLITUS WITH MILD NONPROLIFERATIVE RETINOPATHY WITHOUT MACULAR EDEMA, WITH LONG-TERM CURRENT USE OF INSULIN, UNSPECIFIED LATERALITY (H): Primary | ICD-10-CM

## 2024-04-09 DIAGNOSIS — M54.50 CHRONIC RIGHT-SIDED LOW BACK PAIN WITHOUT SCIATICA: ICD-10-CM

## 2024-04-09 DIAGNOSIS — I10 HTN, GOAL BELOW 140/90: ICD-10-CM

## 2024-04-09 DIAGNOSIS — F25.1 SCHIZOAFFECTIVE DISORDER, DEPRESSIVE TYPE (H): ICD-10-CM

## 2024-04-09 DIAGNOSIS — G89.29 CHRONIC RIGHT-SIDED LOW BACK PAIN WITHOUT SCIATICA: ICD-10-CM

## 2024-04-09 DIAGNOSIS — F32.A DEPRESSION WITH SUICIDAL IDEATION: ICD-10-CM

## 2024-04-09 PROCEDURE — 99214 OFFICE O/P EST MOD 30 MIN: CPT | Performed by: FAMILY MEDICINE

## 2024-04-09 PROCEDURE — 99607 MTMS BY PHARM ADDL 15 MIN: CPT | Performed by: PHARMACIST

## 2024-04-09 PROCEDURE — 99606 MTMS BY PHARM EST 15 MIN: CPT | Performed by: PHARMACIST

## 2024-04-09 RX ORDER — SERTRALINE HYDROCHLORIDE 100 MG/1
100 TABLET, FILM COATED ORAL DAILY
COMMUNITY
Start: 2024-04-05

## 2024-04-09 RX ORDER — TIRZEPATIDE 7.5 MG/.5ML
7.5 INJECTION, SOLUTION SUBCUTANEOUS
Qty: 2 ML | Refills: 1 | Status: SHIPPED | OUTPATIENT
Start: 2024-04-09 | End: 2024-06-24

## 2024-04-09 RX ORDER — QUETIAPINE FUMARATE 100 MG/1
100 TABLET, FILM COATED ORAL AT BEDTIME
COMMUNITY
Start: 2024-03-26

## 2024-04-09 RX ORDER — PSEUDOEPHED/ACETAMINOPH/DIPHEN 30MG-500MG
1000 TABLET ORAL 3 TIMES DAILY
Qty: 180 TABLET | Refills: 3 | Status: SHIPPED | OUTPATIENT
Start: 2024-04-09 | End: 2024-07-25

## 2024-04-09 ASSESSMENT — PAIN SCALES - GENERAL: PAINLEVEL: SEVERE PAIN (6)

## 2024-04-09 NOTE — PROGRESS NOTES
Assessment & Plan     Hip pain, right  I suspect her current pain is musculoskeletal in origin.  We reviewed a previous AP pelvis and hip x-ray from about a year ago that showed some mild degenerative changes in the hip joints as well as the sacroiliac joint on the right side.  NSAIDs are relatively contraindicated so I have suggested scheduled acetaminophen as an initial step and we have scheduled short-term follow-up in about 4 weeks for recheck.  - acetaminophen (TYLENOL) 500 MG tablet; Take 2 tablets (1,000 mg) by mouth 3 times daily    Chronic right-sided low back pain without sciatica  No changes at this time.    Hyperglycemia  Reviewed recent laboratory testing showing elevated fructosamine and glucose.  A1c is under excellent control.  Continuing to lose some weight on the Mounjaro and wanted to increase the dose.    Depression with suicidal ideation  Staff coming with patient today reports that her symptoms have improved since restarting the sertraline.  Saw psychiatry recently and they decreased her quetiapine from 125 mg daily to 100 mg daily and increased her sertraline to 100 mg daily from 50.  These changes are still pending at the home.    Positive AIDA (antinuclear antibody)  Following with rheumatology and currently on leflunomide.              FUTURE APPOINTMENTS:       - Follow-up visit in 4 to 5 weeks    Surjit Barrett is a 64 year old, presenting for the following health issues:  Follow Up and Abdominal Pain (And flank)        4/9/2024     2:46 PM   Additional Questions   Roomed by Karma Kerns     History of Present Illness       Reason for visit:  Follow up    She eats 0-1 servings of fruits and vegetables daily.She consumes 2 sweetened beverage(s) daily.She exercises with enough effort to increase her heart rate 9 or less minutes per day.  She exercises with enough effort to increase her heart rate 3 or less days per week.   She is taking medications regularly.           Patient is  "here for follow-up.  She is accompanied by staff from her facility.  She recently saw psychiatry after restarting her sertraline.  Mood and energy and engagement with other people are reportedly better although she does tear up easily during the visit today.  Her main concern is a worry about appendicitis.  She describes pain starting in the right lower back and radiating anteriorly into her groin but not further down the leg.  There is no rash in the area.  She has no change in urinary function, dysuria, hematuria, or frequency.  Bowel movements are normal.  She has not had any recent vomiting and has tolerated the restart of Mounjaro.  No other staff concerns were noted today.      Review of Systems  Constitutional, HEENT, cardiovascular, pulmonary, gi and gu systems are negative, except as otherwise noted.      Objective    /74 (BP Location: Right arm, Patient Position: Sitting, Cuff Size: Adult Large)   Pulse 81   Temp 97.8  F (36.6  C) (Temporal)   Resp 17   Ht 1.548 m (5' 0.95\")   Wt 104.1 kg (229 lb 8 oz)   LMP 01/06/2015 (Exact Date)   SpO2 95%   BMI 43.44 kg/m    Body mass index is 43.44 kg/m .  Physical Exam   GENERAL: alert and no distress  EYES: Eyes grossly normal to inspection, PERRL and conjunctivae and sclerae normal  NECK: no adenopathy, no asymmetry, masses, or scars  RESP: lungs clear to auscultation - no rales, rhonchi or wheezes  CV: regular rate and rhythm, normal S1 S2, no S3 or S4, no murmur, click or rub, no peripheral edema   MS: no gross musculoskeletal defects noted, no edema  MS: Abdominal examination was benign without tenderness, guarding, rebound.  Range of motion at the hips was normal but SHANON seem to reproduce her symptoms both in the right low back in the groin on the right side but pain-free on the left side.  SKIN: no suspicious lesions or rashes  SKIN: no suspicious lesions or rashes and intertrigo is noted below the abdominal pannus  NEURO: Normal strength and " tone, mentation intact and speech normal  PSYCH: mentation appears normal, affect flat, tearful, judgement and insight intact, and appearance well groomed    Lab on 04/02/2024   Component Date Value Ref Range Status    Fructosamine 04/02/2024 298 (H)  205 - 285 umol/L Final    INTERPRETIVE INFORMATION:  Fructosamine  Variations in levels of serum proteins (albumin and   immunoglobulins) may affect fructosamine results.  Performed By: [facility]  [address] [place] [address]  : Washington Mae MD, PhD  CLIA Number: [id number]    Sodium 04/02/2024 141  135 - 145 mmol/L Final    Reference intervals for this test were updated on 09/26/2023 to more accurately reflect our healthy population. There may be differences in the flagging of prior results with similar values performed with this method. Interpretation of those prior results can be made in the context of the updated reference intervals.     Potassium 04/02/2024 4.1  3.4 - 5.3 mmol/L Final    Carbon Dioxide (CO2) 04/02/2024 25  22 - 29 mmol/L Final    Anion Gap 04/02/2024 9  7 - 15 mmol/L Final    Urea Nitrogen 04/02/2024 16.6  8.0 - 23.0 mg/dL Final    Creatinine 04/02/2024 1.17 (H)  0.51 - 0.95 mg/dL Final    GFR Estimate 04/02/2024 52 (L)  >60 mL/min/1.73m2 Final    Calcium 04/02/2024 9.3  8.8 - 10.2 mg/dL Final    Chloride 04/02/2024 107  98 - 107 mmol/L Final    Glucose 04/02/2024 107 (H)  70 - 99 mg/dL Final    Alkaline Phosphatase 04/02/2024 85  40 - 150 U/L Final    Reference intervals for this test were updated on 11/14/2023 to more accurately reflect our healthy population. There may be differences in the flagging of prior results with similar values performed with this method. Interpretation of those prior results can be made in the context of the updated reference intervals.    AST 04/02/2024 18  0 - 45 U/L Final    Reference intervals for this test were updated on 6/12/2023 to more accurately reflect our  healthy population. There may be differences in the flagging of prior results with similar values performed with this method. Interpretation of those prior results can be made in the context of the updated reference intervals.    ALT 04/02/2024 17  0 - 50 U/L Final    Reference intervals for this test were updated on 6/12/2023 to more accurately reflect our healthy population. There may be differences in the flagging of prior results with similar values performed with this method. Interpretation of those prior results can be made in the context of the updated reference intervals.      Protein Total 04/02/2024 7.3  6.4 - 8.3 g/dL Final    Albumin 04/02/2024 4.3  3.5 - 5.2 g/dL Final    Bilirubin Total 04/02/2024 0.3  <=1.2 mg/dL Final    CRP Inflammation 04/02/2024 <3.00  <5.00 mg/L Final    WBC Count 04/02/2024 5.7  4.0 - 11.0 10e3/uL Final    RBC Count 04/02/2024 3.16 (L)  3.80 - 5.20 10e6/uL Final    Hemoglobin 04/02/2024 9.8 (L)  11.7 - 15.7 g/dL Final    Hematocrit 04/02/2024 30.9 (L)  35.0 - 47.0 % Final    MCV 04/02/2024 98  78 - 100 fL Final    MCH 04/02/2024 31.0  26.5 - 33.0 pg Final    MCHC 04/02/2024 31.7  31.5 - 36.5 g/dL Final    RDW 04/02/2024 14.3  10.0 - 15.0 % Final    Platelet Count 04/02/2024 184  150 - 450 10e3/uL Final    % Neutrophils 04/02/2024 65  % Final    % Lymphocytes 04/02/2024 24  % Final    % Monocytes 04/02/2024 9  % Final    % Eosinophils 04/02/2024 2  % Final    % Basophils 04/02/2024 0  % Final    % Immature Granulocytes 04/02/2024 0  % Final    Absolute Neutrophils 04/02/2024 3.7  1.6 - 8.3 10e3/uL Final    Absolute Lymphocytes 04/02/2024 1.4  0.8 - 5.3 10e3/uL Final    Absolute Monocytes 04/02/2024 0.5  0.0 - 1.3 10e3/uL Final    Absolute Eosinophils 04/02/2024 0.1  0.0 - 0.7 10e3/uL Final    Absolute Basophils 04/02/2024 0.0  0.0 - 0.2 10e3/uL Final    Absolute Immature Granulocytes 04/02/2024 0.0  <=0.4 10e3/uL Final    Quantiferon Nil Tube 04/02/2024 0.01  IU/mL Final     Quantiferon TB1 Tube 04/02/2024 0.03  IU/mL Final    Quantiferon TB2 Tube 04/02/2024 0.01   Final    Quantiferon Mitogen 04/02/2024 10.00  IU/mL Final    Quantiferon-TB Gold Plus 04/02/2024 Negative  Negative Final    No interferon gamma response to M.tuberculosis antigens was detected. Infection with M.tuberculosis is unlikely, however a single negative result does not exclude infection. In patients at high risk for infection, a second test should be considered in accordance with the 2017 ATS/IDSA/CDC Clinical Pract  ice Guidelines for Diagnosis of Tuberculosis in Adults and Children     TB1 Ag minus Nil Value 04/02/2024 0.02  IU/mL Final    TB2 Ag minus Nil Value 04/02/2024 0.00  IU/mL Final    Mitogen minus Nil Result 04/02/2024 9.99  IU/mL Final    Nil Result 04/02/2024 0.01  IU/mL Final    Hepatitis B Surface Antigen 04/02/2024 Nonreactive  Nonreactive Final           Signed Electronically by: Albino Rainey MD

## 2024-04-09 NOTE — PROGRESS NOTES
Medication Therapy Management (MTM) Encounter    ASSESSMENT:                            Medication Adherence/Access: No issues identified    Diabetes:   Patient is meeting A1c goal of < 8%.  Patient is meeting goal of > 50% time in target with continuous glucose monitoring.  She has achieved some weight loss with Mounjaro and would like to continue increasing the dose as tolerated, OK to increase today.     Hypertension: stable    Schizoaffective:   improved    PLAN:                            Finish Mounjaro 5mg every 7 days, then increase to 7.5mg every 7 days    Follow-up: 1 month    SUBJECTIVE/OBJECTIVE:                          Nena Tang is a 64 year old female coming in for a follow-up visit. Patient was accompanied by group home staff. Patient saw provider prior to our visit today.      Reason for visit: medication recheck.    Allergies/ADRs: Reviewed in chart  Past Medical History: Reviewed in chart  Tobacco: She reports that she has never smoked. She has never used smokeless tobacco.  Alcohol: not currently using    Medication Adherence/Access: no issues reported  Patient has assistance with medication administration: group home.    Diabetes   Lantus 22 units twice daily  Humalog sliding scale 8 units + 3 units per 50mg/dL starting glucose 81 and max 23 units   Mounjaro 5mg weekly - reports losing 5 pounds since last visit  Side effects: none.   Reports no constipation but typical BM 2-3 times per week.   Blood sugar monitoring: Continuous Glucose Monitor -       Current diabetes symptoms: none  Diet/Exercise: sugar in coffee at day program, continues to eat fast food some days.         Eye exam is up to date  Foot exam is up to date  Urine Albumin:   Lab Results   Component Value Date    UMALCR 29.10 (H) 12/12/2023      Lab Results   Component Value Date    A1C 6.2 (H) 01/30/2024     Wt Readings from Last 3 Encounters:   04/09/24 229 lb 8 oz (104.1 kg)   04/02/24 228 lb 4.8 oz (103.6 kg)   03/12/24  "234 lb 8 oz (106.4 kg)        Hypertension   Amlodipine 10mg daily  Metoprolol XL 50mg AM and 100mg PM (started in 2009 when diagnosed with CAD)  Losartan 100mg daily  Patient reports no current medication side effects.   Patient has blood pressure monitored by group home.  Home BP monitoring :  Unsure of values  Medication history:   Chlorthalidone - stopped while inpatient, not restarted since blood pressure was well controlled.       BP Readings from Last 3 Encounters:   04/02/24 134/78   03/12/24 128/74   02/06/24 (!) 156/82     Pulse Readings from Last 3 Encounters:   04/02/24 79   03/12/24 75   02/06/24 71       Schizoaffective:  Quetiapine 100mg at bedtime   Paliperidone ER 12mg at bedtime  Sertraline 100mg - will be increasing dose this week, feels better since restarted 50mg dose   Hydroxyzine 25mg in the evening   Trazodone 100mg at bedtime  Clonazepam 0.5mg at bedtime  Prazosin 1mg at bedtime  Amantadine 100mg daily    Patient reports feeling better since last visit. She had a visit recently with psychiatry and will be further increasing sertraline and decreasing quetiapine.     Today's Vitals: LMP 01/06/2015 (Exact Date)   BP Readings from Last 1 Encounters:   04/09/24 136/74     Pulse Readings from Last 1 Encounters:   04/09/24 81     Wt Readings from Last 1 Encounters:   04/09/24 229 lb 8 oz (104.1 kg)     Ht Readings from Last 1 Encounters:   04/09/24 5' 0.95\" (1.548 m)     Estimated body mass index is 43.44 kg/m  as calculated from the following:    Height as of an earlier encounter on 4/9/24: 5' 0.95\" (1.548 m).    Weight as of an earlier encounter on 4/9/24: 229 lb 8 oz (104.1 kg).    Temp Readings from Last 1 Encounters:   04/09/24 97.8  F (36.6  C) (Temporal)       ----------------      I spent 20 minutes with this patient today. All changes were made via collaborative practice agreement with Albino Rainey MD. A copy of the visit note was provided to the patient's provider(s).    A " summary of these recommendations was sent via [employer].    Eugenia De Jesus, PharmD, BCACP   Medication Therapy Management Pharmacist   [facility]  [phone]          Medication Therapy Recommendations  Type 2 diabetes mellitus with mild nonproliferative retinopathy (H)    Current Medication: tirzepatide (MOUNJARO) 5 MG/0.5ML pen (Discontinued)   Rationale: Dose too low - Dosage too low - Effectiveness   Recommendation: Increase Dose - Mounjaro 7.5 MG/0.5ML Sopn   Status: Accepted per CPA

## 2024-04-11 NOTE — PATIENT INSTRUCTIONS
"Recommendations from today's MTM visit:                                                       Finish Mounjaro 5mg every 7 days, then increase to 7.5mg every 7 days    Follow-up: 1 month    It was great speaking with you today.  I value your experience and would be very thankful for your time in providing feedback in our clinic survey. In the next few days, you may receive an email or text message from [facility] with a link to a survey related to your  clinical pharmacist.\"     To schedule another MTM appointment, please call the clinic directly or you may call the MTM scheduling line at [phone] or toll-free at [phone].     My Clinical Pharmacist's contact information:                                                      Please feel free to contact me with any questions or concerns you have.      Eugenia De Jesus, PharmD, [facility]   Medication Therapy Management Pharmacist   [facility]  [phone]   "

## 2024-04-23 DIAGNOSIS — I10 HTN, GOAL BELOW 140/90: ICD-10-CM

## 2024-04-23 DIAGNOSIS — I25.119 CORONARY ARTERY DISEASE INVOLVING NATIVE HEART WITH ANGINA PECTORIS, UNSPECIFIED VESSEL OR LESION TYPE (H): ICD-10-CM

## 2024-04-23 DIAGNOSIS — K21.9 GASTROESOPHAGEAL REFLUX DISEASE WITHOUT ESOPHAGITIS: ICD-10-CM

## 2024-04-24 ENCOUNTER — MYC REFILL (OUTPATIENT)
Dept: FAMILY MEDICINE | Facility: CLINIC | Age: 64
End: 2024-04-24
Payer: COMMERCIAL

## 2024-04-24 DIAGNOSIS — K21.9 GASTROESOPHAGEAL REFLUX DISEASE WITHOUT ESOPHAGITIS: ICD-10-CM

## 2024-04-24 DIAGNOSIS — I10 HTN, GOAL BELOW 140/90: ICD-10-CM

## 2024-04-24 DIAGNOSIS — E11.3299 TYPE 2 DIABETES MELLITUS WITH MILD NONPROLIFERATIVE RETINOPATHY WITHOUT MACULAR EDEMA, WITH LONG-TERM CURRENT USE OF INSULIN, UNSPECIFIED LATERALITY (H): ICD-10-CM

## 2024-04-24 DIAGNOSIS — Z79.4 TYPE 2 DIABETES MELLITUS WITH MILD NONPROLIFERATIVE RETINOPATHY WITHOUT MACULAR EDEMA, WITH LONG-TERM CURRENT USE OF INSULIN, UNSPECIFIED LATERALITY (H): ICD-10-CM

## 2024-04-24 RX ORDER — METOPROLOL SUCCINATE 50 MG/1
TABLET, EXTENDED RELEASE ORAL
Qty: 84 TABLET | Refills: 11 | OUTPATIENT
Start: 2024-04-24

## 2024-04-24 RX ORDER — ASPIRIN 81 MG/1
81 TABLET, COATED ORAL DAILY
Qty: 28 TABLET | Refills: 11 | Status: SHIPPED | OUTPATIENT
Start: 2024-04-24

## 2024-04-24 RX ORDER — PANTOPRAZOLE SODIUM 40 MG/1
40 TABLET, DELAYED RELEASE ORAL DAILY
Qty: 28 TABLET | Refills: 11 | Status: SHIPPED | OUTPATIENT
Start: 2024-04-24

## 2024-04-24 RX ORDER — METOPROLOL SUCCINATE 50 MG/1
TABLET, EXTENDED RELEASE ORAL
Qty: 84 TABLET | Refills: 11 | Status: SHIPPED | OUTPATIENT
Start: 2024-04-24

## 2024-04-24 RX ORDER — UBIQUINOL 100 MG
CAPSULE ORAL
Qty: 200 EACH | Refills: 3 | Status: SHIPPED | OUTPATIENT
Start: 2024-04-24

## 2024-04-24 RX ORDER — PANTOPRAZOLE SODIUM 40 MG/1
40 TABLET, DELAYED RELEASE ORAL DAILY
Qty: 28 TABLET | Refills: 11 | OUTPATIENT
Start: 2024-04-24

## 2024-04-24 NOTE — TELEPHONE ENCOUNTER
Prescription approved per [facility] Refill Protocol.  Susie Negrete, RN  [facility] Triage Nurse

## 2024-04-24 NOTE — PROGRESS NOTES
Diabetes Consult Note        Nena Tang  is a 63 year old female who has a past medical history of Acute respiratory failure with hypoxia (H), CAD (coronary artery disease), Chronic low back pain, Cocaine abuse, in remission (H), Fecal urgency, History of heroin abuse (H), Hyperlipidemia LDL goal <100, Hypertension, Illiterate, Irritable bowel syndrome, Left cataract, Migraine, Migraine headache, Moderate major depression (H), Noncompliance with medication regimen, Obesity, CINDY (obstructive sleep apnea), CINDY (obstructive sleep apnea)- mild AHI 10.3, Osteopenia, Pneumonia of right lower lobe due to infectious organism, Schizoaffective disorder, depressive type (H), Sepsis (H), Suicidal intent, Takotsubo cardiomyopathy, Type 2 diabetes mellitus (H), Uterine cancer (H), and Verbal auditory hallucination. She is here for follow-up of diabetes.            HPI:  Nena was recently hospitalized at [facility] for suicidal ideation September 20 through September 26.  She was followed by the inpatient diabetes service for her type 2 diabetes and was discharged on Lantus 20 units twice daily which was decreased following a steroid taper, NovoLog 1 unit per 7 g of carbohydrate, and NovoLog 1 unit per 25 mg/dL greater than 140 3 times daily and greater than 200 nightly.  She had previously been on Trulicity but this was held while she was hospitalized.    I met eNna in clinic in late October 2023 and advised to increase the Lantus from 20 to 22 units daily.  Transition from Trulicity 3 mg weekly to Mounjaro 5 mg weekly.  We have adjusted her sliding scale insulin to 3 units per 25 greater than 140 before meals and greater than 200 at bedtime and work to assure she would get NovoLog well she was at her day program , as she was coming home from this with dangerously high blood sugars.    In January of this year, we attempted to resume Mounjaro 2.5 mg. This was done with [facility] pharmacy team in February and then  increased to 5 mg and in April to 7.5 mg.  Humalog doses were not reduced.      Interim: As above. Now on Mounjaro 7.5 mg weekly.       Today:  Nena is here today to follow-up.    A1C is 5.5.  She reports that she is feeling very good!  She is losing weight and feels like a new person.  Both she and staff note that she is moving a lot more and moving a lot more easily.  Again she states that she is very happy.  They will are both concerned that she is having some low blood sugars.  They did the pharmacy and have the Mounjaro increased but her insulin doses were not decreased.      She is now on Mounjaro 5 mg weekly, but has and will soon start 7.5 mg weekly Mounjaro.  She is having low blood sugars at times and staff is frequently holding Humalog with meals if blood sugar is normal and also not giving for snacks.      Blood pressures initially elevated today but at home when checked is nearly always less than  140/90 in recent weeks .    Hemoglobin   Date Value Ref Range Status   04/02/2024 9.8 (L) 11.7 - 15.7 g/dL Final   06/15/2021 11.1 (L) 11.7 - 15.7 g/dL Final   ]        Current Diabetes Medications:  Lantus 22 units twice daily  Humalog 8 units per meal  Humalog 3 units per 50 greater than 150 3 times daily AC.    Humalog 3 units per 50 greater than 200 at bedtime.    Mounjaro 5 mg weekly.     She also has an order to take 4 units of Humalog with any snack (but not using)     We reviewed glucometer/CGMS data together.  It revealed:  She has been using Dexcom CGMS.  Her average blood sugar has decreased to just 115 which correlates to an estimated A1c of 6.1.  Her time in range is increased to 87% but with 7% low blood sugars.    History of Diabetes monitoring and complications/ prevention:  CAD: yes  Last eye exam results: She does have retinopathy and sees a retinal specialist.    Neuropathy: yes  Nephropathy:CKD 3  HTN: yes On metoprolol and Cozaar  On statin: Lipitor 80 mg daily  On  ASA:yes  Depression:yes      Nena's PMH, PSH and allergies were reviewed today and pertinent information is summarized above.    Nena's pertinent social and family history are also reviewed today and pertinent information is summarized above.  Also noted is:Lives in group home.  Has lost many family members in recent years.  She lives at [facility] in [place].  Her day program is called a [facility].      Current Outpatient Medications   Medication Sig Dispense Refill    acetaminophen (TYLENOL) 500 MG tablet Take 2 tablets (1,000 mg) by mouth 3 times daily 180 tablet 3    albuterol (PROAIR HFA/PROVENTIL HFA/VENTOLIN HFA) 108 (90 Base) MCG/ACT inhaler Inhale 2 puffs into the lungs every 6 hours as needed for shortness of breath, wheezing or cough 18 g 3    Alcohol Swabs (ALCOHOL PREP) 70 % PADS APPLY 1 UNITS TOPICALLY 8 TIMES DAILY 200 each 3    alendronate (FOSAMAX) 70 MG tablet Take 1 tablet (70 mg) by mouth every 7 days 4 tablet 11    amantadine (SYMMETREL) 100 MG capsule TAKE 1 CAPSULE BY MOUTH DAILY 30 capsule 11    amLODIPine (NORVASC) 10 MG tablet Take 1 tablet (10 mg) by mouth every morning 30 tablet 11    ASPIRIN LOW DOSE 81 MG EC tablet TAKE 1 TABLET BY MOUTH DAILY 28 tablet 11    atorvastatin (LIPITOR) 80 MG tablet TAKE 1 TABLET BY MOUTH DAILY 90 tablet 3    blood glucose (NO BRAND SPECIFIED) test strip Use to test blood sugar 10 times daily or as directed. 100 strip 11    calcium carbonate (OS-ALLEN) 1500 (600 Ca) MG tablet TAKE 1 TABLET BY MOUTH DAILY 90 tablet 3    chlorhexidine (PERIDEX) 0.12 % solution Swish and spit 10 mLs in mouth 2 times daily 1893 mL 3    clonazePAM (KLONOPIN) 0.5 MG tablet TAKE 1 TABLET BY MOUTH EVERY NIGHT AS NEEDED FOR ANXIETY 28 tablet 5    Continuous Blood Gluc Sensor (DEXCOM G6 SENSOR) MISC Change every 10 days. 9 each 2    Continuous Blood Gluc Transmit (DEXCOM G6 TRANSMITTER) MISC Change every 3 months. 1 each 1    diclofenac (VOLTAREN) 1 % topical gel  Apply 4 grams to painful area at bedtime. May use an additional 3 doses during the day as needed 100 g 1    gabapentin (NEURONTIN) 300 MG capsule Take 1 capsule (300 mg) by mouth at bedtime 28 capsule 11    HUMALOG KWIKPEN 100 UNIT/ML soln Inject 0-12 Units Subcutaneous at bedtime BEDTIME-If >4 hours since last Humalog injection For  - 249 give 3 units. For  - 299 give 6 units. For  - 349 give 9 units. For BG = or > 350 give 12 units. 15 mL 1    hydrOXYzine (VISTARIL) 25 MG capsule TAKE 1 CAPSULE BY MOUTH EVERY NIGHT AT BEDTIME ANXIETY/SLEEP 28 capsule 0    ibuprofen (ADVIL/MOTRIN) 200 MG tablet Take 200 mg by mouth every 4 hours as needed for pain      insulin glargine (LANTUS SOLOSTAR) 100 UNIT/ML pen Inject 22 Units Subcutaneous 2 times daily 45 mL 1    insulin pen needle (NOVOFINE AUTOCOVER PEN NEEDLE) 30G X 8 MM miscellaneous USE 6 DAILY OR AS DIRECTED 200 each 0    Lancets (ONETOUCH DELICA PLUS [id number]) MISC 1 EACH AS NEEDED  USE TO TEST BLOOD SUGARS 1-2 TIMES DAILY AS DIRECTED 100 each 1    leflunomide (ARAVA) 20 MG tablet Take 1 tablet (20 mg) by mouth daily Labs every 8-12 weeks 90 tablet 1    Lidocaine (LIDOCAINE PAIN RELIEF) 4 % Patch APPLY 1 PATCH ONTO SKIN EVERY 24 HOURS ; APPLY AT NIGHT AND REMOVE IN THE MORNING ( TWELVE HOURS PATCH FREE) 30 patch 11    loperamide (IMODIUM A-D) 2 MG tablet Take 1 tablet (2 mg) by mouth 4 times daily as needed for diarrhea 30 tablet 0    losartan (COZAAR) 100 MG tablet TAKE 1 TABLET BY MOUTH DAILY 28 tablet 11    Magnesium Oxide 250 MG TABS TAKE 1 TABLET BY MOUTH AT BEDTIME 28 tablet 11    melatonin 5 MG tablet Take 5 mg by mouth at bedtime      metoprolol succinate ER (TOPROL XL) 50 MG 24 hr tablet TAKE 1 TABLET BY MOUTH IN THE MORNING AND TAKE 2 TABLETS AT BEDTIME 84 tablet 11    mirabegron (MYRBETRIQ) 50 MG 24 hr tablet Take 1 tablet (50 mg) by mouth daily 90 tablet 4    naproxen (NAPROSYN) 375 MG tablet Take 1 tablet (375 mg) by mouth 2 times  daily as needed for moderate pain (denatl pain) 60 tablet 11    nystatin (MYCOSTATIN) 258672 UNIT/GM external powder APPLY TOPICALLY TWICE A DAY AS NEEDED 60 g 0    ondansetron (ZOFRAN) 4 MG tablet TAKE 1 TABLET BY MOUTH EVERY 8 HOURS AS NEEDED FOR NAUSEA 10 tablet 1    order for DME Equipment being ordered: Depends. 30 each 4    order for DME Equipment being ordered: CPAP Supplies. 1 each 0    paliperidone ER (INVEGA) 6 MG 24 hr tablet TAKE 2 TABLETS BY MOUTH EVERY NIGHT AT BEDTIME 60 tablet 11    pantoprazole (PROTONIX) 40 MG EC tablet TAKE 1 TABLET (40 MG) BY MOUTH DAILY 28 tablet 11    prazosin (MINIPRESS) 1 MG capsule Take 1 mg by mouth      QUEtiapine (SEROQUEL) 100 MG tablet Take 100 mg by mouth at bedtime      senna-docusate (STIMULANT LAXATIVE) 8.6-50 MG tablet TAKE 1 TABLET BY MOUTH DAILY AND EXTRA 1 TABLET AS NEEDED FOR CONSTIPATION 60 tablet 1    sertraline (ZOLOFT) 100 MG tablet Take 100 mg by mouth daily      tirzepatide (MOUNJARO) 7.5 MG/0.5ML pen Inject 7.5 mg Subcutaneous every 7 days 2 mL 1    traZODone (DESYREL) 100 MG tablet TAKE 1 TABLET BY MOUTH AT BEDTIME 28 tablet 11    vitamin C (ASCORBIC ACID) 500 MG tablet TAKE 2 TABLETS BY MOUTH IN THE MORNING AND TAKE 2 TABLETS BY MOUTH IN THE EVENING 112 tablet 4    zinc oxide (DESITIN) 20 % external ointment Apply topically 3 times daily as needed for irritation (barrier cream) 60 g 3     Current Facility-Administered Medications   Medication Dose Route Frequency Provider Last Rate Last Admin    apraclonidine (IOPIDINE) 1 % ophthalmic solution 1 drop  1 drop Both Eyes Once Tiburcio Chacon MD        lidocaine 1 % injection 4 mL  4 mL   Luiz Heranndez DO   4 mL at 06/22/23 1400    triamcinolone (KENALOG-40) injection 40 mg  40 mg   Luiz Hernandez DO   40 mg at 06/22/23 1400         ROS:   Reports good physical activity tolerance.  Denies any pedal lesions or vision changes or concerns. Denies any other acute concerns except as noted  "above.      Exam:    BP (!) 148/86   Pulse 79   Ht 1.549 m (5' 1\")   Wt 99.8 kg (220 lb)   LMP 01/06/2015 (Exact Date)   SpO2 99%   BMI 41.57 kg/m    Wt Readings from Last 10 Encounters:   05/21/24 99.8 kg (220 lb)   04/09/24 104.1 kg (229 lb 8 oz)   04/02/24 103.6 kg (228 lb 4.8 oz)   03/12/24 106.4 kg (234 lb 8 oz)   02/06/24 107 kg (236 lb)   01/30/24 106.6 kg (235 lb)   01/16/24 109.8 kg (242 lb)   12/12/23 112.3 kg (247 lb 8 oz)   12/11/23 112 kg (247 lb)   11/07/23 113.4 kg (250 lb)     Estimated body mass index is 41.57 kg/m  as calculated from the following:    Height as of this encounter: 1.549 m (5' 1\").    Weight as of this encounter: 99.8 kg (220 lb).    General: Pleasant, well nourished and hydrated female in NAD.   Psych:  Mood is \"good,\" affect is appropriate.  Thought form and content are fluid and coherent.    HEENT: Eyes and sclera are clear. Extraocular movements are grossly intact without proptosis.  Nares are patent, mucous membranes moist.  Neck: No masses or JVD are noted.    Resp: Easy and unlabored breathing.   Neuro: Alert and oriented, communicating clearly.   Ext: no swelling or edema.  Good distal pulses and capillary refill in digits.  Skin in good condition without any lesions appreciated.  Data:      Recent Labs   Lab Test 05/21/24  1320 04/02/24  1203 01/30/24  1130 12/12/23  1212 12/11/23  1059 09/23/23  1143 09/21/23  0908 09/20/23  0016 09/19/23  0750 05/19/23  0913 05/10/23  1709 09/27/22  2216 08/17/22  1138   A1C 5.5  --  6.2*  --   --   --  7.0*  --   --    < >  --    < >  --    TSH  --   --   --   --   --   --  2.86  --   --   --  1.60  --   --    LDL  --   --   --   --   --   --  81  --  68  --   --   --   --    HDL  --   --   --   --   --   --  68  --  56  --   --   --   --    TRIG  --   --   --   --   --   --  136  --  155*  --   --   --   --    CR  --  1.17*  --   --  1.06*   < >  --    < >  --    < > 1.44*   < >  --    MICROL  --   --   --  42.2  --   --   --   --  "  --   --   --   --  11   AST  --  18  --   --  13  --   --    < >  --    < >  --    < >  --    ALT  --  17  --   --  11  --   --    < >  --    < >  --    < >  --     < > = values in this interval not displayed.       Microalbuminuria:  Lab Results   Component Value Date    UMALCR 29.10 (H) 12/12/2023    UMALCR 10.19 08/17/2022       Most recent GFRs:    Lab Results   Component Value Date    GFRESTIMATED 52 (L) 04/02/2024    GFRESTIMATED 59 (L) 12/11/2023    GFRESTIMATED 51 (L) 09/26/2023    GFRESTBLACK >90 06/15/2021    GFRESTBLACK >90 06/15/2021    GFRESTBLACK >90 06/04/2021       Lab Results   Component Value Date    CPEPT 1.4 07/28/2009     FIB-4 Calculation: 0.95 at 9/23/2023 11:43 AM  Calculated from:  SGOT/AST: 13 U/L at 9/20/2023 12:16 AM  SGPT/ALT: 26 U/L at 9/20/2023 12:16 AM  Platelets: 166 10e3/uL at 9/23/2023 11:43 AM  Age: 63 years  AST   Date Value Ref Range Status   04/02/2024 18 0 - 45 U/L Final     Comment:     Reference intervals for this test were updated on 6/12/2023 to more accurately reflect our healthy population. There may be differences in the flagging of prior results with similar values performed with this method. Interpretation of those prior results can be made in the context of the updated reference intervals.   06/15/2021 12 0 - 45 U/L Final     Lab Results   Component Value Date    ALT 26 09/20/2023    ALT 23 06/15/2021         Most recent eye exam date: : Not Found       Assessment/Plan:    Nena Tang is a 63 year old female with poorly controlled type 2 diabetes.    Type 2 diabetes, well-controlled:  Her blood sugars are not well-controlled with a concerning level of hypoglycemia since resuming Mounjaro which she is now taking at 5 mg weekly but planning to increase to 7.5 mg I believe next week.    Today I am advising that they decrease her Lantus to just 18 units twice daily.  If now or when the Mounjaro is increased continues to have blood sugars in the morning less than  90, advised that they decrease the morning and evening dose by 1 unit each each time you see this.  If needed they can call for an updated order.    Once she starts Mounjaro I would like them to remove the base dose for her Humalog, and then use a sliding scale of 2 units per 50 greater than 150 for any blood sugars greater than 150 [place].      She is going to see pharmacy back in about 4 weeks and I would request that they further adjust Humalog as needed when she is on the 7.5 mg of Mounjaro.      2. DM Complications-      Retinopathy:  Yes.  Now up-to-date.  Nephropathy:  Yes.  She does have microalbuminuria as of December.  She is on losartan 100 mg daily.  Neuropathy: Denies difficulties.  Feet: Denies ulcers or lesions.  Foot exam is up-to-date.  Lipids: 10-year atherosclerotic cardiovascular disease risk >20%.  Patient taking a statin.      3. Obesity: She is already lost 16 pounds since February.  I do not think we should continue to increase the Mounjaro dose until she is seen back.  I think we will hold off until her weight stabilizes.  It would be worth continuing to increase at some point if we think we could further minimize or eliminate the use of Humalog.        30 minutes spent on the date of the encounter doing chart review, history and exam, documentation, education and counseling, as well as communication and coordination of care, and further activities as noted above.  This time includes time spent reviewing BG data.      It is my privilege to be involved in the care of the above patient.     Chanell Duque PA-C, MPAS  [facility]  Diabetes, Endocrinology, and Metabolism  [phone] Appointments/Nurse  [phone] pager  [phone] nurse line    This note was completed in part using Dragon voice recognition, and may contain word and grammatical errors.      04/24/24 11:32 AM  PATIENT LAB/IMAGING STATUS : No pending lab orders   Patient is showing 5/5 MNCM met.    Outcome for 05/21/24 1:25 PM: Data obtained via Dexcom website  Maria Del Carmen Mg CMA

## 2024-05-21 ENCOUNTER — OFFICE VISIT (OUTPATIENT)
Dept: ENDOCRINOLOGY | Facility: CLINIC | Age: 64
End: 2024-05-21
Payer: COMMERCIAL

## 2024-05-21 VITALS
BODY MASS INDEX: 41.54 KG/M2 | DIASTOLIC BLOOD PRESSURE: 86 MMHG | HEART RATE: 79 BPM | HEIGHT: 61 IN | WEIGHT: 220 LBS | OXYGEN SATURATION: 99 % | SYSTOLIC BLOOD PRESSURE: 148 MMHG

## 2024-05-21 DIAGNOSIS — E11.22 TYPE 2 DIABETES MELLITUS WITH STAGE 3 CHRONIC KIDNEY DISEASE, WITH LONG-TERM CURRENT USE OF INSULIN, UNSPECIFIED WHETHER STAGE 3A OR 3B CKD (H): ICD-10-CM

## 2024-05-21 DIAGNOSIS — R30.0 DYSURIA: ICD-10-CM

## 2024-05-21 DIAGNOSIS — N18.30 TYPE 2 DIABETES MELLITUS WITH STAGE 3 CHRONIC KIDNEY DISEASE, WITH LONG-TERM CURRENT USE OF INSULIN, UNSPECIFIED WHETHER STAGE 3A OR 3B CKD (H): ICD-10-CM

## 2024-05-21 DIAGNOSIS — Z79.4 TYPE 2 DIABETES MELLITUS WITH STAGE 3 CHRONIC KIDNEY DISEASE, WITH LONG-TERM CURRENT USE OF INSULIN, UNSPECIFIED WHETHER STAGE 3A OR 3B CKD (H): ICD-10-CM

## 2024-05-21 DIAGNOSIS — K59.00 CONSTIPATION, UNSPECIFIED CONSTIPATION TYPE: ICD-10-CM

## 2024-05-21 DIAGNOSIS — M81.0 OSTEOPOROSIS WITHOUT CURRENT PATHOLOGICAL FRACTURE, UNSPECIFIED OSTEOPOROSIS TYPE: ICD-10-CM

## 2024-05-21 DIAGNOSIS — E78.5 HYPERLIPIDEMIA LDL GOAL <100: ICD-10-CM

## 2024-05-21 LAB — HBA1C MFR BLD: 5.5 %

## 2024-05-21 PROCEDURE — 83036 HEMOGLOBIN GLYCOSYLATED A1C: CPT | Performed by: PATHOLOGY

## 2024-05-21 PROCEDURE — 99214 OFFICE O/P EST MOD 30 MIN: CPT | Performed by: PHYSICIAN ASSISTANT

## 2024-05-21 RX ORDER — DOCUSATE SODIUM 50 MG AND SENNOSIDES 8.6 MG 8.6; 5 MG/1; MG/1
TABLET, FILM COATED ORAL
Qty: 90 TABLET | Refills: 1 | Status: SHIPPED | OUTPATIENT
Start: 2024-05-21 | End: 2024-09-10

## 2024-05-21 RX ORDER — ASCORBIC ACID 500 MG
TABLET ORAL
Qty: 112 TABLET | Refills: 1 | Status: SHIPPED | OUTPATIENT
Start: 2024-05-21 | End: 2024-07-17

## 2024-05-21 RX ORDER — ATORVASTATIN CALCIUM 80 MG/1
TABLET, FILM COATED ORAL
Qty: 90 TABLET | Refills: 2 | Status: SHIPPED | OUTPATIENT
Start: 2024-05-21

## 2024-05-21 ASSESSMENT — PAIN SCALES - GENERAL: PAINLEVEL: NO PAIN (0)

## 2024-05-21 NOTE — PATIENT INSTRUCTIONS
Dear Nena and staff,    Please reduce Lantus to 18 units twice daily.  If still low in the mornings (<90,) please reduce both the morning and evening doses by 1 unit each time that you see this.  If needed, call for updated order    When starting Mounjaro 7.5 mg weekly    REDUCE Humalog with meals:    BG 70 - 150:  0 units  151 - 200: 2 units  201 - 250: 4 units  251-300: 6 units  301 - 350: 8 units  351 or greater : 10 units          REDUCE:  BEDTIME-If >4 hours since last Humalog injection     For  - 249 give 1 units.   For  - 299 give 2 units.   For  - 349 give 3 units.   For BG = or > 350 give 4 units.      Our blood glucose goals are as following:  Fasting blood sugar before eating in the mornin - 140 most days   Blood sugar before lunch and dinner: 90 - 160  Blood sugar at bedtime: <180  No blood sugars <55 and rare blood sugars <70.  Please contact us if having any blood sugars <55 or frequent <70.      If BG >350 >4 hours.  Drink water and go to ER.    My best wishes,    Chanell Duque PA-C, MPAS  [facility]  Diabetes, Endocrinology, and Metabolism  [phone] Appointments/Nurse  [phone] Fax

## 2024-05-21 NOTE — LETTER
5/21/2024       RE: Nena Tang  [address] [id number]     Dear Colleague,    Thank you for referring your patient, Nena Tang, to the [facility] ENDOCRINOLOGY CLINIC [place] [facility]. Please see a copy of my visit note below.                   Diabetes Consult Note        Nena Tang  is a 63 year old female who has a past medical history of Acute respiratory failure with hypoxia (H), CAD (coronary artery disease), Chronic low back pain, Cocaine abuse, in remission (H), Fecal urgency, History of heroin abuse (H), Hyperlipidemia LDL goal <100, Hypertension, Illiterate, Irritable bowel syndrome, Left cataract, Migraine, Migraine headache, Moderate major depression (H), Noncompliance with medication regimen, Obesity, CINDY (obstructive sleep apnea), CINDY (obstructive sleep apnea)- mild AHI 10.3, Osteopenia, Pneumonia of right lower lobe due to infectious organism, Schizoaffective disorder, depressive type (H), Sepsis (H), Suicidal intent, Takotsubo cardiomyopathy, Type 2 diabetes mellitus (H), Uterine cancer (H), and Verbal auditory hallucination. She is here for follow-up of diabetes.            HPI:  Nena was recently hospitalized at [facility] for suicidal ideation September 20 through September 26.  She was followed by the inpatient diabetes service for her type 2 diabetes and was discharged on Lantus 20 units twice daily which was decreased following a steroid taper, NovoLog 1 unit per 7 g of carbohydrate, and NovoLog 1 unit per 25 mg/dL greater than 140 3 times daily and greater than 200 nightly.  She had previously been on Trulicity but this was held while she was hospitalized.    I met Nena in clinic in late October 2023 and advised to increase the Lantus from 20 to 22 units daily.  Transition from Trulicity 3 mg weekly to Mounjaro 5 mg weekly.  We have adjusted her sliding scale insulin to 3 units per 25 greater than  140 before meals and greater than 200 at bedtime and work to assure she would get NovoLog well she was at her day program , as she was coming home from this with dangerously high blood sugars.    In January of this year, we attempted to resume Mounjaro 2.5 mg. This was done with [facility] pharmacy team in February and then increased to 5 mg and in April to 7.5 mg.  Humalog doses were not reduced.      Interim: As above. Now on Mounjaro 7.5 mg weekly.       Today:  Nena is here today to follow-up.    A1C is 5.5.  She reports that she is feeling very good!  She is losing weight and feels like a new person.  Both she and staff note that she is moving a lot more and moving a lot more easily.  Again she states that she is very happy.  They will are both concerned that she is having some low blood sugars.  They did the pharmacy and have the Mounjaro increased but her insulin doses were not decreased.      She is now on Mounjaro 5 mg weekly, but has and will soon start 7.5 mg weekly Mounjaro.  She is having low blood sugars at times and staff is frequently holding Humalog with meals if blood sugar is normal and also not giving for snacks.      Blood pressures initially elevated today but at home when checked is nearly always less than  140/90 in recent weeks .    Hemoglobin   Date Value Ref Range Status   04/02/2024 9.8 (L) 11.7 - 15.7 g/dL Final   06/15/2021 11.1 (L) 11.7 - 15.7 g/dL Final   ]        Current Diabetes Medications:  Lantus 22 units twice daily  Humalog 8 units per meal  Humalog 3 units per 50 greater than 150 3 times daily AC.    Humalog 3 units per 50 greater than 200 at bedtime.    Mounjaro 5 mg weekly.     She also has an order to take 4 units of Humalog with any snack (but not using)     We reviewed glucometer/CGMS data together.  It revealed:  She has been using Dexcom CGMS.  Her average blood sugar has decreased to just 115 which correlates to an estimated A1c of 6.1.  Her time in range is increased to  87% but with 7% low blood sugars.    History of Diabetes monitoring and complications/ prevention:  CAD: yes  Last eye exam results: She does have retinopathy and sees a retinal specialist.    Neuropathy: yes  Nephropathy:CKD 3  HTN: yes On metoprolol and Cozaar  On statin: Lipitor 80 mg daily  On ASA:yes  Depression:yes      Nena's PMH, PSH and allergies were reviewed today and pertinent information is summarized above.    Nena's pertinent social and family history are also reviewed today and pertinent information is summarized above.  Also noted is:Lives in group home.  Has lost many family members in recent years.  She lives at [facility] in [place].  Her day program is called a peace of mind.      Current Outpatient Medications   Medication Sig Dispense Refill    acetaminophen (TYLENOL) 500 MG tablet Take 2 tablets (1,000 mg) by mouth 3 times daily 180 tablet 3    albuterol (PROAIR HFA/PROVENTIL HFA/VENTOLIN HFA) 108 (90 Base) MCG/ACT inhaler Inhale 2 puffs into the lungs every 6 hours as needed for shortness of breath, wheezing or cough 18 g 3    Alcohol Swabs (ALCOHOL PREP) 70 % PADS APPLY 1 UNITS TOPICALLY 8 TIMES DAILY 200 each 3    alendronate (FOSAMAX) 70 MG tablet Take 1 tablet (70 mg) by mouth every 7 days 4 tablet 11    amantadine (SYMMETREL) 100 MG capsule TAKE 1 CAPSULE BY MOUTH DAILY 30 capsule 11    amLODIPine (NORVASC) 10 MG tablet Take 1 tablet (10 mg) by mouth every morning 30 tablet 11    ASPIRIN LOW DOSE 81 MG EC tablet TAKE 1 TABLET BY MOUTH DAILY 28 tablet 11    atorvastatin (LIPITOR) 80 MG tablet TAKE 1 TABLET BY MOUTH DAILY 90 tablet 3    blood glucose (NO BRAND SPECIFIED) test strip Use to test blood sugar 10 times daily or as directed. 100 strip 11    calcium carbonate (OS-ALLEN) 1500 (600 Ca) MG tablet TAKE 1 TABLET BY MOUTH DAILY 90 tablet 3    chlorhexidine (PERIDEX) 0.12 % solution Swish and spit 10 mLs in mouth 2 times daily 1893 mL 3    clonazePAM (KLONOPIN) 0.5 MG  tablet TAKE 1 TABLET BY MOUTH EVERY NIGHT AS NEEDED FOR ANXIETY 28 tablet 5    Continuous Blood Gluc Sensor (DEXCOM G6 SENSOR) MISC Change every 10 days. 9 each 2    Continuous Blood Gluc Transmit (DEXCOM G6 TRANSMITTER) MISC Change every 3 months. 1 each 1    diclofenac (VOLTAREN) 1 % topical gel Apply 4 grams to painful area at bedtime. May use an additional 3 doses during the day as needed 100 g 1    gabapentin (NEURONTIN) 300 MG capsule Take 1 capsule (300 mg) by mouth at bedtime 28 capsule 11    HUMALOG KWIKPEN 100 UNIT/ML soln Inject 0-12 Units Subcutaneous at bedtime BEDTIME-If >4 hours since last Humalog injection For  - 249 give 3 units. For  - 299 give 6 units. For  - 349 give 9 units. For BG = or > 350 give 12 units. 15 mL 1    hydrOXYzine (VISTARIL) 25 MG capsule TAKE 1 CAPSULE BY MOUTH EVERY NIGHT AT BEDTIME ANXIETY/SLEEP 28 capsule 0    ibuprofen (ADVIL/MOTRIN) 200 MG tablet Take 200 mg by mouth every 4 hours as needed for pain      insulin glargine (LANTUS SOLOSTAR) 100 UNIT/ML pen Inject 22 Units Subcutaneous 2 times daily 45 mL 1    insulin pen needle (NOVOFINE AUTOCOVER PEN NEEDLE) 30G X 8 MM miscellaneous USE 6 DAILY OR AS DIRECTED 200 each 0    Lancets (ONETOUCH DELICA PLUS [id number]) MISC 1 EACH AS NEEDED  USE TO TEST BLOOD SUGARS 1-2 TIMES DAILY AS DIRECTED 100 each 1    leflunomide (ARAVA) 20 MG tablet Take 1 tablet (20 mg) by mouth daily Labs every 8-12 weeks 90 tablet 1    Lidocaine (LIDOCAINE PAIN RELIEF) 4 % Patch APPLY 1 PATCH ONTO SKIN EVERY 24 HOURS ; APPLY AT NIGHT AND REMOVE IN THE MORNING ( TWELVE HOURS PATCH FREE) 30 patch 11    loperamide (IMODIUM A-D) 2 MG tablet Take 1 tablet (2 mg) by mouth 4 times daily as needed for diarrhea 30 tablet 0    losartan (COZAAR) 100 MG tablet TAKE 1 TABLET BY MOUTH DAILY 28 tablet 11    Magnesium Oxide 250 MG TABS TAKE 1 TABLET BY MOUTH AT BEDTIME 28 tablet 11    melatonin 5 MG tablet Take 5 mg by mouth at bedtime      metoprolol  succinate ER (TOPROL XL) 50 MG 24 hr tablet TAKE 1 TABLET BY MOUTH IN THE MORNING AND TAKE 2 TABLETS AT BEDTIME 84 tablet 11    mirabegron (MYRBETRIQ) 50 MG 24 hr tablet Take 1 tablet (50 mg) by mouth daily 90 tablet 4    naproxen (NAPROSYN) 375 MG tablet Take 1 tablet (375 mg) by mouth 2 times daily as needed for moderate pain (denatl pain) 60 tablet 11    nystatin (MYCOSTATIN) 066958 UNIT/GM external powder APPLY TOPICALLY TWICE A DAY AS NEEDED 60 g 0    ondansetron (ZOFRAN) 4 MG tablet TAKE 1 TABLET BY MOUTH EVERY 8 HOURS AS NEEDED FOR NAUSEA 10 tablet 1    order for DME Equipment being ordered: Depends. 30 each 4    order for DME Equipment being ordered: CPAP Supplies. 1 each 0    paliperidone ER (INVEGA) 6 MG 24 hr tablet TAKE 2 TABLETS BY MOUTH EVERY NIGHT AT BEDTIME 60 tablet 11    pantoprazole (PROTONIX) 40 MG EC tablet TAKE 1 TABLET (40 MG) BY MOUTH DAILY 28 tablet 11    prazosin (MINIPRESS) 1 MG capsule Take 1 mg by mouth      QUEtiapine (SEROQUEL) 100 MG tablet Take 100 mg by mouth at bedtime      senna-docusate (STIMULANT LAXATIVE) 8.6-50 MG tablet TAKE 1 TABLET BY MOUTH DAILY AND EXTRA 1 TABLET AS NEEDED FOR CONSTIPATION 60 tablet 1    sertraline (ZOLOFT) 100 MG tablet Take 100 mg by mouth daily      tirzepatide (MOUNJARO) 7.5 MG/0.5ML pen Inject 7.5 mg Subcutaneous every 7 days 2 mL 1    traZODone (DESYREL) 100 MG tablet TAKE 1 TABLET BY MOUTH AT BEDTIME 28 tablet 11    vitamin C (ASCORBIC ACID) 500 MG tablet TAKE 2 TABLETS BY MOUTH IN THE MORNING AND TAKE 2 TABLETS BY MOUTH IN THE EVENING 112 tablet 4    zinc oxide (DESITIN) 20 % external ointment Apply topically 3 times daily as needed for irritation (barrier cream) 60 g 3     Current Facility-Administered Medications   Medication Dose Route Frequency Provider Last Rate Last Admin    apraclonidine (IOPIDINE) 1 % ophthalmic solution 1 drop  1 drop Both Eyes Once Tbiurcio Chacon MD        lidocaine 1 % injection 4 mL  4 mL   Mary,  "Luiz PADILLA, DO   4 mL at 06/22/23 1400    triamcinolone (KENALOG-40) injection 40 mg  40 mg   Luiz Hernandez DO   40 mg at 06/22/23 1400         ROS:   Reports good physical activity tolerance.  Denies any pedal lesions or vision changes or concerns. Denies any other acute concerns except as noted above.      Exam:    BP (!) 148/86   Pulse 79   Ht 1.549 m (5' 1\")   Wt 99.8 kg (220 lb)   LMP 01/06/2015 (Exact Date)   SpO2 99%   BMI 41.57 kg/m    Wt Readings from Last 10 Encounters:   05/21/24 99.8 kg (220 lb)   04/09/24 104.1 kg (229 lb 8 oz)   04/02/24 103.6 kg (228 lb 4.8 oz)   03/12/24 106.4 kg (234 lb 8 oz)   02/06/24 107 kg (236 lb)   01/30/24 106.6 kg (235 lb)   01/16/24 109.8 kg (242 lb)   12/12/23 112.3 kg (247 lb 8 oz)   12/11/23 112 kg (247 lb)   11/07/23 113.4 kg (250 lb)     Estimated body mass index is 41.57 kg/m  as calculated from the following:    Height as of this encounter: 1.549 m (5' 1\").    Weight as of this encounter: 99.8 kg (220 lb).    General: Pleasant, well nourished and hydrated female in NAD.   Psych:  Mood is \"good,\" affect is appropriate.  Thought form and content are fluid and coherent.    HEENT: Eyes and sclera are clear. Extraocular movements are grossly intact without proptosis.  Nares are patent, mucous membranes moist.  Neck: No masses or JVD are noted.    Resp: Easy and unlabored breathing.   Neuro: Alert and oriented, communicating clearly.   Ext: no swelling or edema.  Good distal pulses and capillary refill in digits.  Skin in good condition without any lesions appreciated.  Data:      Recent Labs   Lab Test 05/21/24  1320 04/02/24  1203 01/30/24  1130 12/12/23  1212 12/11/23  1059 09/23/23  1143 09/21/23  0908 09/20/23  0016 09/19/23  0750 05/19/23  0913 05/10/23  1709 09/27/22  2216 08/17/22  1138   A1C 5.5  --  6.2*  --   --   --  7.0*  --   --    < >  --    < >  --    TSH  --   --   --   --   --   --  2.86  --   --   --  1.60  --   --    LDL  --   --   --   --   --   " --  81  --  68  --   --   --   --    HDL  --   --   --   --   --   --  68  --  56  --   --   --   --    TRIG  --   --   --   --   --   --  136  --  155*  --   --   --   --    CR  --  1.17*  --   --  1.06*   < >  --    < >  --    < > 1.44*   < >  --    MICROL  --   --   --  42.2  --   --   --   --   --   --   --   --  11   AST  --  18  --   --  13  --   --    < >  --    < >  --    < >  --    ALT  --  17  --   --  11  --   --    < >  --    < >  --    < >  --     < > = values in this interval not displayed.       Microalbuminuria:  Lab Results   Component Value Date    UMALCR 29.10 (H) 12/12/2023    UMALCR 10.19 08/17/2022       Most recent GFRs:    Lab Results   Component Value Date    GFRESTIMATED 52 (L) 04/02/2024    GFRESTIMATED 59 (L) 12/11/2023    GFRESTIMATED 51 (L) 09/26/2023    GFRESTBLACK >90 06/15/2021    GFRESTBLACK >90 06/15/2021    GFRESTBLACK >90 06/04/2021       Lab Results   Component Value Date    CPEPT 1.4 07/28/2009     FIB-4 Calculation: 0.95 at 9/23/2023 11:43 AM  Calculated from:  SGOT/AST: 13 U/L at 9/20/2023 12:16 AM  SGPT/ALT: 26 U/L at 9/20/2023 12:16 AM  Platelets: 166 10e3/uL at 9/23/2023 11:43 AM  Age: 63 years  AST   Date Value Ref Range Status   04/02/2024 18 0 - 45 U/L Final     Comment:     Reference intervals for this test were updated on 6/12/2023 to more accurately reflect our healthy population. There may be differences in the flagging of prior results with similar values performed with this method. Interpretation of those prior results can be made in the context of the updated reference intervals.   06/15/2021 12 0 - 45 U/L Final     Lab Results   Component Value Date    ALT 26 09/20/2023    ALT 23 06/15/2021         Most recent eye exam date: : Not Found       Assessment/Plan:    Nena Tang is a 63 year old female with poorly controlled type 2 diabetes.    Type 2 diabetes, well-controlled:  Her blood sugars are not well-controlled with a concerning level of hypoglycemia  since resuming Mounjaro which she is now taking at 5 mg weekly but planning to increase to 7.5 mg I believe next week.    Today I am advising that they decrease her Lantus to just 18 units twice daily.  If now or when the Mounjaro is increased continues to have blood sugars in the morning less than 90, advised that they decrease the morning and evening dose by 1 unit each each time you see this.  If needed they can call for an updated order.    Once she starts Mounjaro I would like them to remove the base dose for her Humalog, and then use a sliding scale of 2 units per 50 greater than 150 for any blood sugars greater than 150 Premeal.      She is going to see pharmacy back in about 4 weeks and I would request that they further adjust Humalog as needed when she is on the 7.5 mg of Mounjaro.      2. DM Complications-      Retinopathy:  Yes.  Now up-to-date.  Nephropathy:  Yes.  She does have microalbuminuria as of December.  She is on losartan 100 mg daily.  Neuropathy: Denies difficulties.  Feet: Denies ulcers or lesions.  Foot exam is up-to-date.  Lipids: 10-year atherosclerotic cardiovascular disease risk >20%.  Patient taking a statin.      3. Obesity: She is already lost 16 pounds since February.  I do not think we should continue to increase the Mounjaro dose until she is seen back.  I think we will hold off until her weight stabilizes.  It would be worth continuing to increase at some point if we think we could further minimize or eliminate the use of Humalog.        30 minutes spent on the date of the encounter doing chart review, history and exam, documentation, education and counseling, as well as communication and coordination of care, and further activities as noted above.  This time includes time spent reviewing BG data.      It is my privilege to be involved in the care of the above patient.     Chanell Duque PA-C, MPAS  [facility]  Diabetes, Endocrinology, and  Metabolism  [phone] Appointments/Nurse  [phone] pager  [phone] nurse line    This note was completed in part using Dragon voice recognition, and may contain word and grammatical errors.      04/24/24 11:32 AM  PATIENT LAB/IMAGING STATUS : No pending lab orders   Patient is showing 5/5 MNCM met.   Outcome for 05/21/24 1:25 PM: Data obtained via Dexcom website  Maria Del Carmen Mg CMA

## 2024-05-24 ENCOUNTER — TELEPHONE (OUTPATIENT)
Dept: ENDOCRINOLOGY | Facility: CLINIC | Age: 64
End: 2024-05-24
Payer: COMMERCIAL

## 2024-05-24 RX ORDER — INSULIN LISPRO 100 [IU]/ML
0-12 INJECTION, SOLUTION INTRAVENOUS; SUBCUTANEOUS
Qty: 15 ML | Refills: 1 | Status: SHIPPED | OUTPATIENT
Start: 2024-05-24 | End: 2024-08-02

## 2024-05-24 RX ORDER — INSULIN GLARGINE 100 [IU]/ML
18 INJECTION, SOLUTION SUBCUTANEOUS 2 TIMES DAILY
Qty: 45 ML | Refills: 1 | Status: SHIPPED | OUTPATIENT
Start: 2024-05-24 | End: 2024-08-02

## 2024-05-24 NOTE — LETTER
[facility] ENDOCRINOLOGY CLINIC [address]  [address] [facility] [address]  Clinic  Fax  After Hours On-Call [phone]    FACSIMILE TRANSMITTAL sent 24 MAY 2024 at 2:10PM     TO:  DAVIN MARTE   COMPANY:  [facility]   FAX NUMBER:       __X__URGENT _____REVIEW ONLY _____PLEASE COMMENT____PLEASE REPLY    NOTES/COMMENTS: RE: Nena Tang 1960.   Instructions      Return in about 3 months (around 2024).  Dear Nena and staff,     Please reduce Lantus to 18 units twice daily.  If still low in the mornings (<90,) please reduce both the morning and evening doses by 1 unit each time that you see this.  If needed, call for updated order or with any questions. [phone].      When starting Mounjaro 7.5 mg weekly     REDUCE Humalog with meals:     BG 70 - 150:  0 units  151 - 200: 2 units  201 - 250: 4 units  251-300: 6 units  301 - 350: 8 units  351 or greater : 10 units      REDUCE:  BEDTIME-If >4 hours since last Humalog injection     For  - 249 give 1 units.   For  - 299 give 2 units.   For  - 349 give 3 units.   For BG = or > 350 give 4 units.      Our blood glucose goals are as following:  Fasting blood sugar before eating in the mornin - 140 most days   Blood sugar before lunch and dinner: 90 - 160  Blood sugar at bedtime: <180  No blood sugars <55 and rare blood sugars <70.  Please contact us if having any blood sugars <55 or frequent <70.      If BG >350 >4 hours.  Drink water and go to ER.     My best wishes,     Chanell Duque PA-C, MPAS            IF YOU DID NOT RECEIVE THE CORRECT NUMBER OF PAGES OR THE FAX DID NOT COME THROUGH CLEARLY, PLEASE CALL THE SENDER     CONFIDENTIALITY STATEMENT: Confidential information that may accompany this transmission contains protected health information under state and federal law and is legally privileged. This information is intended only for the  use of the individual or entity named above and may be used only for carrying out treatment, payment or other healthcare operations. The recipient or person responsible for delivering this information is prohibited by law from disclosing this information without proper authorization to any other party, unless required to do so by law or regulation. If you are not the intended recipient, you are hereby notified that any review, dissemination, distribution, or copying of this message is strictly prohibited. If you have received this communication in error, please destroy the materials and contact us immediately by calling the number listed above. No response indicates that the information was received by the appropriate authorized party

## 2024-05-24 NOTE — TELEPHONE ENCOUNTER
Spoke w/ DAVIN Neves AVS faxed to  with clinic number for questions. Need the new orders signed.   Lorri Newman, RN on 5/24/2024 at 2:08 PM         assure doses reduced please  Received: Today  Chanell Duque, BRANDON  [facility]  Please send updated AVS, asking staff  to note changes.  Please call to clarify any questions.  Reduce Lantus now - Humalog further as indicated when 7.5 mg weekly Mounjaro starts.  Thank you!

## 2024-05-28 ENCOUNTER — OFFICE VISIT (OUTPATIENT)
Dept: PHARMACY | Facility: CLINIC | Age: 64
End: 2024-05-28
Payer: COMMERCIAL

## 2024-05-28 ENCOUNTER — MYC MEDICAL ADVICE (OUTPATIENT)
Dept: PHARMACY | Facility: CLINIC | Age: 64
End: 2024-05-28
Payer: COMMERCIAL

## 2024-05-28 VITALS — DIASTOLIC BLOOD PRESSURE: 52 MMHG | SYSTOLIC BLOOD PRESSURE: 110 MMHG

## 2024-05-28 DIAGNOSIS — E11.22 TYPE 2 DIABETES MELLITUS WITH STAGE 3 CHRONIC KIDNEY DISEASE, WITH LONG-TERM CURRENT USE OF INSULIN, UNSPECIFIED WHETHER STAGE 3A OR 3B CKD (H): Primary | ICD-10-CM

## 2024-05-28 DIAGNOSIS — E11.3299 TYPE 2 DIABETES MELLITUS WITH MILD NONPROLIFERATIVE RETINOPATHY WITHOUT MACULAR EDEMA, WITH LONG-TERM CURRENT USE OF INSULIN, UNSPECIFIED LATERALITY (H): Primary | ICD-10-CM

## 2024-05-28 DIAGNOSIS — Z79.4 TYPE 2 DIABETES MELLITUS WITH STAGE 3 CHRONIC KIDNEY DISEASE, WITH LONG-TERM CURRENT USE OF INSULIN, UNSPECIFIED WHETHER STAGE 3A OR 3B CKD (H): Primary | ICD-10-CM

## 2024-05-28 DIAGNOSIS — E08.3292: ICD-10-CM

## 2024-05-28 DIAGNOSIS — I10 HTN, GOAL BELOW 140/90: ICD-10-CM

## 2024-05-28 DIAGNOSIS — N18.30 TYPE 2 DIABETES MELLITUS WITH STAGE 3 CHRONIC KIDNEY DISEASE, WITH LONG-TERM CURRENT USE OF INSULIN, UNSPECIFIED WHETHER STAGE 3A OR 3B CKD (H): Primary | ICD-10-CM

## 2024-05-28 DIAGNOSIS — Z79.4 TYPE 2 DIABETES MELLITUS WITH MILD NONPROLIFERATIVE RETINOPATHY WITHOUT MACULAR EDEMA, WITH LONG-TERM CURRENT USE OF INSULIN, UNSPECIFIED LATERALITY (H): Primary | ICD-10-CM

## 2024-05-28 PROCEDURE — 99606 MTMS BY PHARM EST 15 MIN: CPT | Performed by: PHARMACIST

## 2024-05-28 PROCEDURE — 99607 MTMS BY PHARM ADDL 15 MIN: CPT | Performed by: PHARMACIST

## 2024-05-28 NOTE — PATIENT INSTRUCTIONS
"Recommendations from today's MTM visit:                                                         Decrease Lantus to 18 units twice a day per endocrinology     Patient to keep source of glucose at bedside for overnight treatment of hypoglycemia as needed (juice, candy, etc)    Call or MyChart clinic if Dexcom continues to alarm overnight     Call or MyChart clinic if blood pressure is low with home checks, particularly if systolic <100 or if Nena is dizzy and systolic <110    Follow-up: 1 month    It was great speaking with you today.  I value your experience and would be very thankful for your time in providing feedback in our clinic survey. In the next few days, you may receive an email or text message from [employer] with a link to a survey related to your  clinical pharmacist.\"     To schedule another MTM appointment, please call the clinic directly or you may call the MTM scheduling line at [phone] or toll-free at [phone].     My Clinical Pharmacist's contact information:                                                      Please feel free to contact me with any questions or concerns you have.      Eugenia De Jesus, PharmD, BCACP   Medication Therapy Management Pharmacist   [facility]  [phone]    "

## 2024-05-28 NOTE — PROGRESS NOTES
Medication Therapy Management (MTM) Encounter    ASSESSMENT:                            Medication Adherence/Access: See below for considerations    Diabetes:   Patient is meeting A1c goal of < 8%.  Patient is meeting goal of > 50% time in target with continuous glucose monitoring.   Patient would benefit from reducing basal insulin per endocrinology to bring up her overnight glucose and reduce risk of hypoglycemia.    Discussed safety with patient and staff. She is agreeable to treating overnight hypoglycemia and keeping something bedside for treatment. They were strongly encouraged to call clinic if she continues to have overnight alarms or daytime hypoglycemia.       Hypertension:   Blood pressure well below goal <140/90 today. Discussed with staff when to call clinic for low blood pressure; may need to reduce doses of antihypertensives given her weight loss.    PLAN:                            Decrease Lantus to 18 units twice a day per endocrinology - group home documentation updated    Patient to keep source of glucose at bedside for overnight treatment of hypoglycemia as needed    Follow-up: 4 weeks    SUBJECTIVE/OBJECTIVE:                          Nena Tang is a 64 year old female coming in for a follow-up visit. Patient was accompanied by group home staff.      Reason for visit: medication recheck.    Allergies/ADRs: Reviewed in chart  Past Medical History: Reviewed in chart  Tobacco: She reports that she has never smoked. She has never used smokeless tobacco.  Alcohol: not currently using    Medication Adherence/Access: no issues reported  Patient has assistance with medication administration: group home.    Diabetes   Lantus 22 units twice daily (did not decrease dose per endocrine)  Humalog sliding scale 8 units + 3 units per 50mg/dL starting glucose 81 (will reduce to sliding scale ONLY once starting Mounjaro 7.5mg)  Mounjaro 5mg weekly - will be further increasing dose to 7.5mg on in 3 weeks when  "uses   Side effects: sometimes feels sick to her stomach.  No vomiting. Diarrhea the day she takes Mounjaro (3x BM, loose not watery. Takes loperamide as needed, works well)    Blood sugar monitoring: Continuous Glucose Monitor -         Staff is rechecking blood glucose when Dexcom reads that glucose is low. Most of the time, blood glucose is reading 20-30 points higher than Dexcom. They have not caught a blood sugar <70 with fingerstick.  Current diabetes symptoms: dizzy, a little shaky or sweaty sometimes.    When Dexcom is alarming overnight, she is turning it off and ignoring, does not get up to check glucose or treat hypoglycemia.  Diet/Exercise: lower appetite with Mounjaro.  Happy with weight loss.       Eye exam is up to date  Foot exam is up to date    Lab Results   Component Value Date    A1C 5.5 05/21/2024    A1C 6.2 01/30/2024    A1C 7.0 09/21/2023    A1C 6.7 06/20/2023    A1C 6.5 03/14/2023    A1C 6.4 12/12/2022    A1C 7.4 08/17/2022    A1C 9.1 12/01/2020    A1C 9.7 09/15/2020    A1C 8.6 06/30/2020    A1C 6.2 12/03/2019    A1C 6.6 08/06/2019      Wt Readings from Last 10 Encounters:   05/21/24 220 lb (99.8 kg)   04/09/24 229 lb 8 oz (104.1 kg)   04/02/24 228 lb 4.8 oz (103.6 kg)   03/12/24 234 lb 8 oz (106.4 kg)   02/06/24 236 lb (107 kg)   01/30/24 235 lb (106.6 kg)   01/16/24 242 lb (109.8 kg)   12/12/23 247 lb 8 oz (112.3 kg)   12/11/23 247 lb (112 kg)   11/07/23 250 lb (113.4 kg)        Hypertension   Amlodipine 10mg daily  Metoprolol XL 50mg AM and 100mg PM  Losartan 100mg daily  Patient reports no current medication side effects.   Patient has blood pressure monitored by group home.  Home BP monitoring :  Unsure of values but \"normal\"  Medication history:   Chlorthalidone - stopped while inpatient, not restarted since blood pressure was well controlled.  BP Readings from Last 3 Encounters:   05/21/24 (!) 148/86   04/09/24 136/74   04/02/24 134/78             Today's Vitals: /52   LMP " 01/06/2015 (Exact Date)   ----------------      I spent 40 minutes with this patient today. All changes were made via collaborative practice agreement with Albino Rainey MD. A copy of the visit note was provided to the patient's provider(s).    A summary of these recommendations was sent via [employer].    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

## 2024-05-28 NOTE — LETTER
May 30, 2024      Hi Amal,    Please check Nena's blood pressure 3 times per week.  Please reach out to the clinic if blood pressure is under 100/60.   Thank you      Eugenia De Jesus, PharmD, [facility]   Medication Therapy Management Pharmacist   [facility]  [phone] Dear Nena,

## 2024-05-30 NOTE — TELEPHONE ENCOUNTER
Made letter, printed it off, and faxed over orders per Eugenia to Amal to number in message below.    Manjinder Cook RN   [facility]

## 2024-05-31 ENCOUNTER — OFFICE VISIT (OUTPATIENT)
Dept: URGENT CARE | Facility: URGENT CARE | Age: 64
End: 2024-05-31
Payer: COMMERCIAL

## 2024-05-31 VITALS
RESPIRATION RATE: 16 BRPM | HEART RATE: 80 BPM | TEMPERATURE: 97 F | OXYGEN SATURATION: 95 % | SYSTOLIC BLOOD PRESSURE: 128 MMHG | DIASTOLIC BLOOD PRESSURE: 80 MMHG

## 2024-05-31 DIAGNOSIS — E11.3299 TYPE 2 DIABETES MELLITUS WITH MILD NONPROLIFERATIVE RETINOPATHY WITHOUT MACULAR EDEMA, WITH LONG-TERM CURRENT USE OF INSULIN, UNSPECIFIED LATERALITY (H): ICD-10-CM

## 2024-05-31 DIAGNOSIS — R31.9 URINARY TRACT INFECTION WITH HEMATURIA, SITE UNSPECIFIED: ICD-10-CM

## 2024-05-31 DIAGNOSIS — N18.9 CHRONIC KIDNEY DISEASE, UNSPECIFIED CKD STAGE: ICD-10-CM

## 2024-05-31 DIAGNOSIS — N39.0 URINARY TRACT INFECTION WITH HEMATURIA, SITE UNSPECIFIED: ICD-10-CM

## 2024-05-31 DIAGNOSIS — Z79.4 TYPE 2 DIABETES MELLITUS WITH MILD NONPROLIFERATIVE RETINOPATHY WITHOUT MACULAR EDEMA, WITH LONG-TERM CURRENT USE OF INSULIN, UNSPECIFIED LATERALITY (H): ICD-10-CM

## 2024-05-31 DIAGNOSIS — R30.0 DYSURIA: Primary | ICD-10-CM

## 2024-05-31 PROCEDURE — 81001 URINALYSIS AUTO W/SCOPE: CPT | Performed by: PHYSICIAN ASSISTANT

## 2024-05-31 PROCEDURE — 87086 URINE CULTURE/COLONY COUNT: CPT | Performed by: PHYSICIAN ASSISTANT

## 2024-05-31 PROCEDURE — 99214 OFFICE O/P EST MOD 30 MIN: CPT | Performed by: PHYSICIAN ASSISTANT

## 2024-05-31 PROCEDURE — 87186 SC STD MICRODIL/AGAR DIL: CPT | Performed by: PHYSICIAN ASSISTANT

## 2024-05-31 RX ORDER — CEFDINIR 300 MG/1
300 CAPSULE ORAL 2 TIMES DAILY
Qty: 14 CAPSULE | Refills: 0 | Status: SHIPPED | OUTPATIENT
Start: 2024-05-31 | End: 2024-06-07

## 2024-05-31 NOTE — PROGRESS NOTES
"  Assessment & Plan     Dysuria    UA is POS  Urine culture pending  Will start pt on omnicef  - UA Macroscopic with reflex to Microscopic and Culture - Clinic Collect  - UA Microscopic with Reflex to Culture  - Urine Culture  - cefdinir (OMNICEF) 300 MG capsule; Take 1 capsule (300 mg) by mouth 2 times daily for 7 days    Urinary tract infection with hematuria, site unspecified    You have been diagnosed with a UTI.  This is one of the most common infections in women because women have a shorter urethra than men. Bacteria have a shorter distance to travel to reach the bladder.. Women who have gone through menopause also lose the protection from estrogen that lowers the chance of getting a UTI. And some women are at higher risk because of their genes. The most common cause of bladder infections is bacteria from the bowels. The bacteria get onto the skin around the opening of the urethra. From there, they can get into the urine. Then they travel up to the bladder, causing inflammation and infection.  Make sure you urinate after sex, drink plenty of fluids and do not hold in your urine.  We have started you on antibiotics for infection and we are awaiting urine culture results, if there is antibiotic resistance on the culture we will call you and switch antibiotics.     - cefdinir (OMNICEF) 300 MG capsule; Take 1 capsule (300 mg) by mouth 2 times daily for 7 days    Type 2 diabetes mellitus with mild nonproliferative retinopathy without macular edema, with long-term current use of insulin, unspecified laterality (H)    Patient to monitor blood sugars as elevated blood sugars can increase risk of infection    Chronic kidney disease, unspecified CKD stage    Estimated Creatinine Clearance: 53.3 mL/min (A) (based on SCr of 1.17 mg/dL (H)).      BMI  Estimated body mass index is 41.57 kg/m  as calculated from the following:    Height as of 5/21/24: 1.549 m (5' 1\").    Weight as of 5/21/24: 99.8 kg (220 lb).       At " today's visit with Nena Tang , we discussed results, diagnosis, medications and formulated a plan.  We also discussed red flags for immediate return to clinic/ER, as well as indications for follow up with PCP if not improved in 3 days. Patient understood and agreed to plan. Nena Tang was discharged with stable vitals and has no further questions.       No follow-ups on file.    Subjective   Nena is a 64 year old, presenting for the following health issues:  UTI (Low back pain, urinary frequency, urine odor-hx of bladder infections )    HPI     Review of Systems  Constitutional, HEENT, cardiovascular, pulmonary, gi and gu systems are negative, except as otherwise noted.      Objective    /80   Pulse 80   Temp 97  F (36.1  C) (Tympanic)   Resp 16   LMP 01/06/2015 (Exact Date)   SpO2 95%   There is no height or weight on file to calculate BMI.  Physical Exam   GENERAL: alert and no distress  ABDOMEN: soft, nontender, no hepatosplenomegaly, no masses and bowel sounds normal  MS: no gross musculoskeletal defects noted, no edema  SKIN: no suspicious lesions or rashes  NEURO: Normal strength and tone, mentation intact and speech normal  PSYCH: mentation appears normal, affect normal/bright      Results for orders placed or performed in visit on 05/31/24   UA Macroscopic with reflex to Microscopic and Culture - Clinic Collect     Status: Abnormal    Specimen: Urine, Midstream   Result Value Ref Range    Color Urine Yellow Colorless, Straw, Light Yellow, Yellow    Appearance Urine Clear Clear    Glucose Urine Negative Negative mg/dL    Bilirubin Urine Negative Negative    Ketones Urine Negative Negative mg/dL    Specific Gravity Urine 1.020 1.003 - 1.035    Blood Urine Negative Negative    pH Urine 6.0 5.0 - 7.0    Protein Albumin Urine Negative Negative mg/dL    Urobilinogen Urine 0.2 0.2, 1.0 E.U./dL    Nitrite Urine Negative Negative    Leukocyte Esterase Urine Small (A) Negative   UA  Microscopic with Reflex to Culture     Status: Abnormal   Result Value Ref Range    Bacteria Urine Many (A) None Seen /HPF    RBC Urine 0-2 0-2 /HPF /HPF    WBC Urine 10-25 (A) 0-5 /HPF /HPF    Squamous Epithelials Urine Few (A) None Seen /LPF             Signed Electronically by: Herman Vargas, SAVANNA, PAChrisC

## 2024-06-04 LAB — BACTERIA UR CULT: ABNORMAL

## 2024-06-06 ENCOUNTER — OFFICE VISIT (OUTPATIENT)
Dept: URGENT CARE | Facility: URGENT CARE | Age: 64
End: 2024-06-06
Payer: COMMERCIAL

## 2024-06-06 VITALS
HEART RATE: 83 BPM | TEMPERATURE: 97.6 F | BODY MASS INDEX: 41.38 KG/M2 | DIASTOLIC BLOOD PRESSURE: 93 MMHG | OXYGEN SATURATION: 96 % | WEIGHT: 219 LBS | SYSTOLIC BLOOD PRESSURE: 159 MMHG

## 2024-06-06 DIAGNOSIS — Z79.4 TYPE 2 DIABETES MELLITUS WITH STAGE 3A CHRONIC KIDNEY DISEASE, WITH LONG-TERM CURRENT USE OF INSULIN (H): ICD-10-CM

## 2024-06-06 DIAGNOSIS — M54.50 ACUTE BILATERAL LOW BACK PAIN WITHOUT SCIATICA: Primary | ICD-10-CM

## 2024-06-06 DIAGNOSIS — N18.31 TYPE 2 DIABETES MELLITUS WITH STAGE 3A CHRONIC KIDNEY DISEASE, WITH LONG-TERM CURRENT USE OF INSULIN (H): ICD-10-CM

## 2024-06-06 DIAGNOSIS — E11.22 TYPE 2 DIABETES MELLITUS WITH STAGE 3A CHRONIC KIDNEY DISEASE, WITH LONG-TERM CURRENT USE OF INSULIN (H): ICD-10-CM

## 2024-06-06 LAB
ALBUMIN UR-MCNC: ABNORMAL MG/DL
APPEARANCE UR: CLEAR
BACTERIA #/AREA URNS HPF: NORMAL /HPF
BILIRUB UR QL STRIP: NEGATIVE
CLUE CELLS: NORMAL
COLOR UR AUTO: YELLOW
GLUCOSE UR STRIP-MCNC: NEGATIVE MG/DL
HGB UR QL STRIP: NEGATIVE
KETONES UR STRIP-MCNC: NEGATIVE MG/DL
LEUKOCYTE ESTERASE UR QL STRIP: ABNORMAL
NITRATE UR QL: NEGATIVE
PH UR STRIP: 6 [PH] (ref 5–7)
RBC #/AREA URNS AUTO: NORMAL /HPF
SP GR UR STRIP: 1.02 (ref 1–1.03)
TRICHOMONAS, WET PREP: NORMAL
UROBILINOGEN UR STRIP-ACNC: 0.2 E.U./DL
WBC #/AREA URNS AUTO: NORMAL /HPF
WBC'S/HIGH POWER FIELD, WET PREP: NORMAL
YEAST, WET PREP: NORMAL

## 2024-06-06 PROCEDURE — 87210 SMEAR WET MOUNT SALINE/INK: CPT | Performed by: NURSE PRACTITIONER

## 2024-06-06 PROCEDURE — 99214 OFFICE O/P EST MOD 30 MIN: CPT | Performed by: NURSE PRACTITIONER

## 2024-06-06 PROCEDURE — 81001 URINALYSIS AUTO W/SCOPE: CPT | Performed by: NURSE PRACTITIONER

## 2024-06-06 RX ORDER — CEFTRIAXONE SODIUM 1 G
1 VIAL (EA) INJECTION ONCE
Status: CANCELLED | OUTPATIENT
Start: 2024-06-06 | End: 2024-06-06

## 2024-06-06 NOTE — PROGRESS NOTES
Chief Complaint   Patient presents with    Urgent Care     Urinary frequency and flank pain (right) x 2 days         ICD-10-CM    1. Acute bilateral low back pain without sciatica  M54.50       2. Type 2 diabetes mellitus with stage 3a chronic kidney disease, with long-term current use of insulin (H)  E11.22     N18.31     Z79.4       Believe this is patient's chronic low back pain and unrelated to her urinary tract.  Facility has been only applying her Voltaren gel once a day instead of 4 times a day as ordered.  Instructed him to resume applying 4 times a day.  She is optimized on other nonnarcotic pain medications due to history of chronic pain.  Suggested they may use heat or ice for comfort    She does have a history of diabetes and is receiving medications as prescribed.  Blood sugars have not been increased recently with urinary tract infection.  Her last hemoglobin A1c was 6.6 five months ago.      Red flag warning signs and when to go to the emergency room discussed.  Reviewed potential adverse reactions to medications.    Results for orders placed or performed in visit on 06/06/24 (from the past 24 hour(s))   UA with Microscopic reflex to Culture - Clinic Collect    Specimen: Urine, Clean Catch   Result Value Ref Range    Color Urine Yellow Colorless, Straw, Light Yellow, Yellow    Appearance Urine Clear Clear    Glucose Urine Negative Negative mg/dL    Bilirubin Urine Negative Negative    Ketones Urine Negative Negative mg/dL    Specific Gravity Urine 1.025 1.003 - 1.035    Blood Urine Negative Negative    pH Urine 6.0 5.0 - 7.0    Protein Albumin Urine Trace (A) Negative mg/dL    Urobilinogen Urine 0.2 0.2, 1.0 E.U./dL    Nitrite Urine Negative Negative    Leukocyte Esterase Urine Trace (A) Negative   Wet prep - Clinic Collect    Specimen: Vagina; Swab   Result Value Ref Range    Trichomonas Absent Absent    Yeast Absent Absent    Clue Cells Absent Absent    WBCs/high power field None None   UA  Microscopic with Reflex to Culture   Result Value Ref Range    Bacteria Urine None Seen None Seen /HPF    RBC Urine None Seen 0-2 /HPF /HPF    WBC Urine 0-5 0-5 /HPF /HPF    Narrative    Urine Culture not indicated     *Note: Due to a large number of results and/or encounters for the requested time period, some results have not been displayed. A complete set of results can be found in Results Review.       Subjective     Nena Tang is an 64 year old female who presents to clinic today for bilateral low back pain.  She is currently being treated for acute cystitis with cefdinir.  Staff at facility was concerned because she is complaining of low back pain and is concerned she may not be getting better.    She denies fever, chills, nausea vomiting, dysuria, urinary frequency or urgency.  There has not been any trauma or falls.  She does acknowledge having chronic low back pain.      Objective    BP (!) 159/93   Pulse 83   Temp 97.6  F (36.4  C) (Tympanic)   Wt 99.3 kg (219 lb)   LMP 01/06/2015 (Exact Date)   SpO2 96%   BMI 41.38 kg/m    Nurses notes and VS have been reviewed.    Physical Exam       GENERAL APPEARANCE: healthy appearing, alert     RESP: lungs clear to auscultation - no rales, rhonchi or wheezes     CV: regular rates and rhythm, no murmurs, rubs, or gallop     ABDOMEN:  soft, nontender, no HSM or masses and bowel sounds normal     MS: extremities normal- no gross deformities noted; normal muscle tone.     SKIN: no suspicious lesions or rashes      ART Brooks, CNP  [facility]    The use of Dragon/Mapori dictation services may have been used to construct the content in this note; any grammatical or spelling errors are non-intentional. Please contact the author of this note directly if you are in need of any clarification.

## 2024-06-13 ENCOUNTER — OFFICE VISIT (OUTPATIENT)
Dept: OPHTHALMOLOGY | Facility: CLINIC | Age: 64
End: 2024-06-13
Attending: OPHTHALMOLOGY
Payer: COMMERCIAL

## 2024-06-13 DIAGNOSIS — H40.003 GLAUCOMA SUSPECT OF BOTH EYES: Primary | ICD-10-CM

## 2024-06-13 DIAGNOSIS — E08.3299: ICD-10-CM

## 2024-06-13 PROCEDURE — G0463 HOSPITAL OUTPT CLINIC VISIT: HCPCS

## 2024-06-13 PROCEDURE — 99214 OFFICE O/P EST MOD 30 MIN: CPT

## 2024-06-13 PROCEDURE — 92133 CPTRZD OPH DX IMG PST SGM ON: CPT

## 2024-06-13 PROCEDURE — 92134 CPTRZ OPH DX IMG PST SGM RTA: CPT

## 2024-06-13 PROCEDURE — 99207 OCT OPTIC NERVE RNFL SPECTRALIS OU (BOTH EYES): CPT | Mod: 26

## 2024-06-13 RX ORDER — AMOXICILLIN 250 MG
1 CAPSULE ORAL DAILY
COMMUNITY

## 2024-06-13 ASSESSMENT — REFRACTION_WEARINGRX
OD_AXIS: 172
OD_SPHERE: PLANO
OD_ADD: +2.50
OD_CYLINDER: +1.00
OS_CYLINDER: +2.00
OS_AXIS: 080
OS_ADD: +2.50
OS_SPHERE: -1.75

## 2024-06-13 ASSESSMENT — CONF VISUAL FIELD
OS_INFERIOR_TEMPORAL_RESTRICTION: 0
OS_NORMAL: 1
OD_SUPERIOR_TEMPORAL_RESTRICTION: 0
OD_SUPERIOR_NASAL_RESTRICTION: 0
OD_INFERIOR_NASAL_RESTRICTION: 0
OD_INFERIOR_TEMPORAL_RESTRICTION: 0
OS_SUPERIOR_TEMPORAL_RESTRICTION: 0
METHOD: COUNTING FINGERS
OS_SUPERIOR_NASAL_RESTRICTION: 0
OS_INFERIOR_NASAL_RESTRICTION: 0
OD_NORMAL: 1

## 2024-06-13 ASSESSMENT — CUP TO DISC RATIO
OS_RATIO: 0.3
OD_RATIO: 0.3

## 2024-06-13 ASSESSMENT — EXTERNAL EXAM - RIGHT EYE: OD_EXAM: NORMAL

## 2024-06-13 ASSESSMENT — TONOMETRY
IOP_METHOD: ICARE
IOP_METHOD: TONOPEN
IOP_METHOD: TONOPEN
OS_IOP_MMHG: 22
OD_IOP_MMHG: 28
OS_IOP_MMHG: 29
OD_IOP_MMHG: 30
OD_IOP_MMHG: 33
OS_IOP_MMHG: 24

## 2024-06-13 ASSESSMENT — VISUAL ACUITY
OD_PH_SC: 20/25
OS_PH_SC+: -2
OS_SC+: +2
OD_SC: 20/30
OD_SC+: -2
OS_SC: 20/70
OD_PH_SC+: -2
METHOD: SNELLEN - LINEAR
OS_PH_SC: 20/50

## 2024-06-13 ASSESSMENT — SLIT LAMP EXAM - LIDS
COMMENTS: NORMAL
COMMENTS: NORMAL

## 2024-06-13 ASSESSMENT — EXTERNAL EXAM - LEFT EYE: OS_EXAM: NORMAL

## 2024-06-13 NOTE — NURSING NOTE
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 She is noticing occasionally blurriness but not consistently. She denies using AT at the moment. She states flashes/ and floaters are stable. Denies eye pain, dryness, tearing.     Last BS: 126 Currently   DM2  Lab Results       Component                Value               Date                       A1C                      5.5                 05/21/2024                 A1C                      6.2                 01/30/2024                 A1C                      7.0                 09/21/2023                 A1C                      6.7                 06/20/2023                 A1C                      6.5                 03/14/2023                 A1C                      6.4                 12/12/2022                 A1C                      7.4                 08/17/2022                 A1C                      9.1                 12/01/2020                 A1C                      9.7                 09/15/2020                 A1C                      8.6                 06/30/2020                 A1C                      6.2                 12/03/2019                 A1C                      6.6                 08/06/2019              Sandi Green 10:24 AM  June 13, 2024

## 2024-06-15 ENCOUNTER — MEDICAL CORRESPONDENCE (OUTPATIENT)
Dept: HEALTH INFORMATION MANAGEMENT | Facility: CLINIC | Age: 64
End: 2024-06-15

## 2024-06-15 ENCOUNTER — OFFICE VISIT (OUTPATIENT)
Dept: URGENT CARE | Facility: URGENT CARE | Age: 64
End: 2024-06-15
Payer: COMMERCIAL

## 2024-06-15 VITALS
OXYGEN SATURATION: 95 % | WEIGHT: 213.4 LBS | DIASTOLIC BLOOD PRESSURE: 80 MMHG | SYSTOLIC BLOOD PRESSURE: 141 MMHG | BODY MASS INDEX: 40.32 KG/M2 | HEART RATE: 82 BPM | TEMPERATURE: 98 F

## 2024-06-15 DIAGNOSIS — Z79.4 TYPE 2 DIABETES MELLITUS WITH STAGE 3A CHRONIC KIDNEY DISEASE, WITH LONG-TERM CURRENT USE OF INSULIN (H): ICD-10-CM

## 2024-06-15 DIAGNOSIS — E11.22 TYPE 2 DIABETES MELLITUS WITH STAGE 3A CHRONIC KIDNEY DISEASE, WITH LONG-TERM CURRENT USE OF INSULIN (H): ICD-10-CM

## 2024-06-15 DIAGNOSIS — F25.9 SCHIZOAFFECTIVE DISORDER, UNSPECIFIED TYPE (H): ICD-10-CM

## 2024-06-15 DIAGNOSIS — N18.31 TYPE 2 DIABETES MELLITUS WITH STAGE 3A CHRONIC KIDNEY DISEASE, WITH LONG-TERM CURRENT USE OF INSULIN (H): ICD-10-CM

## 2024-06-15 DIAGNOSIS — L08.9 INFECTED SKIN LESION: Primary | ICD-10-CM

## 2024-06-15 PROCEDURE — 99214 OFFICE O/P EST MOD 30 MIN: CPT | Performed by: FAMILY MEDICINE

## 2024-06-15 RX ORDER — MUPIROCIN 20 MG/G
OINTMENT TOPICAL 2 TIMES DAILY
Qty: 22 G | Refills: 0 | Status: SHIPPED | OUTPATIENT
Start: 2024-06-15

## 2024-06-15 RX ORDER — DOXYCYCLINE HYCLATE 100 MG
100 TABLET ORAL 2 TIMES DAILY
Qty: 20 TABLET | Refills: 0 | Status: SHIPPED | OUTPATIENT
Start: 2024-06-15 | End: 2024-06-25

## 2024-06-15 NOTE — PROGRESS NOTES
ASSESSMENT/PLAN:      ICD-10-CM    1. Infected skin lesion  L08.9 doxycycline hyclate (VIBRA-TABS) 100 MG tablet     mupirocin (BACTROBAN) 2 % external ointment    left buttock-4 cm lesion wiht eschar, erythema, tender, no flucutance, 2 1 cm nontedner, resolving lesions upper left  buttock along border of the gluteal fold      2. Schizoaffective disorder, unspecified type (H)  F25.9     pt lives in assisted living, stable on current meds      3. Type 2 diabetes mellitus with stage 3a chronic kidney disease, with long-term current use of insulin (H)  E11.22     N18.31     Z79.4           Patient Instructions   Start doxycycline 2 times a day for 10 days always take with food to prevent nausea vomiting -take first dose tonight    Wound care-    In AM and Bedtime-Wash area with mild soap  and water, dry the area then Apply Bactroban ointment to both infected skin lesions left buttock and near top of the gluteal fold  in a.m. and bedtime    Wear skirts loose clothing soft clothing for the next 7 to 10 days until wounds heal    Encourage patient to sit more on her right buttock to relieve pressure on the left buttock when she is sitting for periods of time    If possible, please allow patient to wear during the daytime a large urinary incontinence pad and not the briefs to avoid the briefs rubbing on the lesions on  her buttocks    Please make a follow-up appointment with her primary care provider or one of his associates next week-midweek for recheck of wound and long term plan for wound care     If fever, increasing pain, area of buttocks is increasing in size, any pus or drainage, area is starting to ulcerate to ER              Reviewed medication instructions and side effects. Follow up if experiences side effects.     I reviewed supportive care, otc meds to use if needed, expected course, and signs of concern.  Follow up as needed or if she does not improve within  1-2 days or if worsens in any way.  Reviewed  red flag symptoms and is to go to the ER if experiences any of these.     The use of Dragon/[employer] dictation services may have been used to construct the content in this note; any grammatical or spelling errors are non-intentional. Please contact the author of this note directly if you are in need of any clarification.      On the day of the encounter, time spend on chart review, patient visit, review of testing, documentation was 30 minutes               Patient presents with:  Urgent Care: Blister/sore on left buttock, possibly infected. History of poor wound healing due to diabetes.        Subjective     Nena Tang is a 64 year old female who presents to clinic today for the following health issues:    HPI     Infected skin lesion    Onset of lesion was 1 week(s) ago. -small pruritic lesion on left buttock, now increase in size, scratching/rubbing at area, thick scab, concerned may be a ulcer area burns at times if sits too long without shifting weight off area   Ongoing infected skin lesion-upper left side of gluteal fold-comes and goes for last 3-4 months,tender if touches area, scaly, no drainage  patient wear adult briefs, concerned area is getting worse due to briefs rubbing areas and causing recurrent sores   Treatment measures tried include topical antibiotic ointment and anti itch creams-no improvement    Patient with type 2 diabetes with neuropathy, retinopathy and CKD 3a-history of taking long periods of time to heal with sores/wounds on skin in the past     Patient lives in an assisted living facility -[facility] -schizoaffective disorder stable on current medications     Past Medical History:   Diagnosis Date    Acute respiratory failure with hypoxia (H) 09/04/2017    CAD (coronary artery disease)     5/2014 cath, nonbostructive stenosis to LAD, RCA.    Chronic low back pain 01/22/2013    Cocaine abuse, in remission (H)     Fecal urgency 03/08/2012    History of heroin abuse (H)      Hyperlipidemia LDL goal <100 10/31/2010    Hypertension 07/29/2013    Illiterate 08/30/2011    Irritable bowel syndrome     Left cataract     Migraine 04/19/2012    Migraine headache 04/22/2013    Moderate major depression (H) 06/08/2011    Noncompliance with medication regimen 06/08/2011    Obesity     CINDY (obstructive sleep apnea) 03/08/2012    uses CPAP    CINDY (obstructive sleep apnea)- mild AHI 10.3     Osteopenia 10/07/2009    Pneumonia     Pneumonia of right lower lobe due to infectious organism 09/04/2017    Schizoaffective disorder, depressive type (H) 02/25/2013    Sepsis (H) 08/29/2017    Suicidal intent 10/02/2013    Takotsubo cardiomyopathy     Type 2 diabetes mellitus (H) 08/30/2011    Uterine cancer (H) 1983    Verbal auditory hallucination 10/04/2012     Social History     Tobacco Use    Smoking status: Never    Smokeless tobacco: Never   Substance Use Topics    Alcohol use: Not Currently     Comment: when younger       Current Outpatient Medications   Medication Sig Dispense Refill    acetaminophen (TYLENOL) 500 MG tablet Take 2 tablets (1,000 mg) by mouth 3 times daily 180 tablet 3    albuterol (PROAIR HFA/PROVENTIL HFA/VENTOLIN HFA) 108 (90 Base) MCG/ACT inhaler Inhale 2 puffs into the lungs every 6 hours as needed for shortness of breath, wheezing or cough 18 g 3    Alcohol Swabs (ALCOHOL PREP) 70 % PADS APPLY 1 UNITS TOPICALLY 8 TIMES DAILY 200 each 3    alendronate (FOSAMAX) 70 MG tablet Take 1 tablet (70 mg) by mouth every 7 days 4 tablet 11    amantadine (SYMMETREL) 100 MG capsule TAKE 1 CAPSULE BY MOUTH DAILY 30 capsule 11    amLODIPine (NORVASC) 10 MG tablet Take 1 tablet (10 mg) by mouth every morning 30 tablet 11    ASPIRIN LOW DOSE 81 MG EC tablet TAKE 1 TABLET BY MOUTH DAILY 28 tablet 11    atorvastatin (LIPITOR) 80 MG tablet TAKE 1 TABLET BY MOUTH DAILY 90 tablet 2    blood glucose (NO BRAND SPECIFIED) test strip Use to test blood sugar 10 times daily or as directed. 100 strip 11     calcium carbonate (OS-ALLEN) 1500 (600 Ca) MG tablet TAKE 1 TABLET BY MOUTH DAILY 90 tablet 2    chlorhexidine (PERIDEX) 0.12 % solution Swish and spit 10 mLs in mouth 2 times daily 1893 mL 3    Continuous Blood Gluc Sensor (DEXCOM G6 SENSOR) MISC Change every 10 days. 9 each 2    Continuous Blood Gluc Transmit (DEXCOM G6 TRANSMITTER) MISC Change every 3 months. 1 each 1    diclofenac (VOLTAREN) 1 % topical gel Apply 4 grams to painful area at bedtime. May use an additional 3 doses during the day as needed 100 g 1    gabapentin (NEURONTIN) 300 MG capsule Take 1 capsule (300 mg) by mouth at bedtime 28 capsule 11    HUMALOG KWIKPEN 100 UNIT/ML soln Inject 0-12 Units Subcutaneous 4 times daily (with meals and nightly) Before meals: BG 81 - 150:  0 units 151 - 200: 2 units, 201 - 250: 4 units, 251-300: 6 units, 301 - 350: 8 units, 351 or greater : 10 units At BEDTIME-If >4 hours since last Humalog injection For  - 249 give 1 units, 250 - 299 give 2 units,300 - 349 give 3 units, = or > 350 give 4 units. 15 mL 1    hydrOXYzine (VISTARIL) 25 MG capsule TAKE 1 CAPSULE BY MOUTH EVERY NIGHT AT BEDTIME ANXIETY/SLEEP 28 capsule 0    ibuprofen (ADVIL/MOTRIN) 200 MG tablet Take 200 mg by mouth every 4 hours as needed for pain      insulin glargine (LANTUS SOLOSTAR) 100 UNIT/ML pen Inject 18 Units Subcutaneous 2 times daily If still low in the mornings (<90,) please reduce both the morning and evening doses by 1 unit each time that you see this. 45 mL 1    insulin pen needle (NOVOFINE AUTOCOVER PEN NEEDLE) 30G X 8 MM miscellaneous USE 6 DAILY OR AS DIRECTED 200 each 0    Lancets (ONETOUCH DELICA PLUS [id number]) MISC 1 EACH AS NEEDED  USE TO TEST BLOOD SUGARS 1-2 TIMES DAILY AS DIRECTED 100 each 1    leflunomide (ARAVA) 20 MG tablet Take 1 tablet (20 mg) by mouth daily Labs every 8-12 weeks 90 tablet 1    Lidocaine (LIDOCAINE PAIN RELIEF) 4 % Patch APPLY 1 PATCH ONTO SKIN EVERY 24 HOURS ; APPLY AT NIGHT AND REMOVE IN THE  MORNING ( TWELVE HOURS PATCH FREE) 30 patch 11    loperamide (IMODIUM A-D) 2 MG tablet Take 1 tablet (2 mg) by mouth 4 times daily as needed for diarrhea 30 tablet 0    losartan (COZAAR) 100 MG tablet TAKE 1 TABLET BY MOUTH DAILY 28 tablet 11    Magnesium Oxide 250 MG TABS TAKE 1 TABLET BY MOUTH AT BEDTIME 28 tablet 11    melatonin 5 MG tablet Take 5 mg by mouth at bedtime      metoprolol succinate ER (TOPROL XL) 50 MG 24 hr tablet TAKE 1 TABLET BY MOUTH IN THE MORNING AND TAKE 2 TABLETS AT BEDTIME 84 tablet 11    mirabegron (MYRBETRIQ) 50 MG 24 hr tablet Take 1 tablet (50 mg) by mouth daily 90 tablet 4    mupirocin (BACTROBAN) 2 % external ointment Apply topically 2 times daily 22 g 0    naproxen (NAPROSYN) 375 MG tablet Take 1 tablet (375 mg) by mouth 2 times daily as needed for moderate pain (denatl pain) 60 tablet 11    nystatin (MYCOSTATIN) 547094 UNIT/GM external powder APPLY TOPICALLY TWICE A DAY AS NEEDED 60 g 0    ondansetron (ZOFRAN) 4 MG tablet TAKE 1 TABLET BY MOUTH EVERY 8 HOURS AS NEEDED FOR NAUSEA 10 tablet 1    order for DME Equipment being ordered: Depends. 30 each 4    order for DME Equipment being ordered: CPAP Supplies. 1 each 0    paliperidone ER (INVEGA) 6 MG 24 hr tablet TAKE 2 TABLETS BY MOUTH EVERY NIGHT AT BEDTIME 60 tablet 11    pantoprazole (PROTONIX) 40 MG EC tablet TAKE 1 TABLET (40 MG) BY MOUTH DAILY 28 tablet 11    prazosin (MINIPRESS) 1 MG capsule Take 1 mg by mouth at bedtime      QUEtiapine (SEROQUEL) 100 MG tablet Take 100 mg by mouth at bedtime      senna-docusate (SENOKOT-S/PERICOLACE) 8.6-50 MG tablet Take 1 tablet by mouth daily      senna-docusate (STIMULANT LAXATIVE) 8.6-50 MG tablet TAKE 1 TABLET BY MOUTH DAILY AND EXTRA 1 TABLET AS NEEDED FOR CONSTIPATION 90 tablet 1    sertraline (ZOLOFT) 100 MG tablet Take 100 mg by mouth daily      traZODone (DESYREL) 100 MG tablet TAKE 1 TABLET BY MOUTH AT BEDTIME 28 tablet 11    vitamin C (ASCORBIC ACID) 500 MG tablet TAKE 2 TABLETS  BY MOUTH IN THE MORNING AND TAKE 2 TABLETS BY MOUTH IN THE EVENING 112 tablet 1    zinc oxide (DESITIN) 20 % external ointment Apply topically 3 times daily as needed for irritation (barrier cream) 60 g 3    clonazePAM (KLONOPIN) 0.5 MG tablet Take 1 tablet (0.5 mg) by mouth nightly as needed for anxiety 28 tablet 5    MOUNJARO 7.5 MG/0.5ML pen INJECT 7.5MG SUBCUTANEOUSLY EVERY 7 DAYS 2 mL 1     Allergies   Allergen Reactions    Imidazole Antifungals Hives     Tolerates diflucan    Ketoprofen Itching     Pruritis to topical    Lisinopril Hives    Metformin Other (See Comments)     Patient hospitalized for lactic acidosis - admitting provider suspectd caused by metformin    Metronidazole Hives    Penicillins     Posaconazole Hives     Tolerates diflucan             ROS are negative, except as otherwise noted HPI      Objective    BP (!) 141/80 (BP Location: Left arm, Patient Position: Sitting, Cuff Size: Adult Large)   Pulse 82   Temp 98  F (36.7  C) (Tympanic)   Wt 96.8 kg (213 lb 6.4 oz)   LMP 01/06/2015 (Exact Date)   SpO2 95%   BMI 40.32 kg/m    Body mass index is 40.32 kg/m .  Physical Exam     GENERAL: alert and no distress    SKIN:   left buttock -mid buttock tender circular 4 cm in diameter lesion with a thin band of erythema of the soft tissue surrounding the lesion and a thin vertical band of black eschar extending the diameter of the lesion starting at 12 o clock and extending down to  to 6 o clock, no fluctuance, mild induration, no drainage, no active bleeding,crusting from previous drainage from site noted    Gluteal fold -skin of buttocks-left border of gluteal fold near apex of fold--2 small 1 cm in diameter lesion with white scale/peeling along border of lesions, small amount of central pale pink tissue noted in both lesions, non tender, no drainage, no crusting, no erythema, no fluctuance   No additional lesions noted with exam of the  tissue of the inner portion of the gluteal fold     NEURO:  Baseline strength and tone, baseline mentation intact and speech, baseline gait     Psychiatric: mentation at baseline and affect normal       Diagnostic Test Results:  Labs reviewed in Epic  No results found for any visits on 06/15/24.

## 2024-06-15 NOTE — PATIENT INSTRUCTIONS
Start doxycycline 2 times a day for 10 days always take with food to prevent nausea vomiting -take first dose tonight    Wound care-    In AM and Bedtime-Wash area with mild soap  and water, dry the area then Apply Bactroban ointment to both infected skin lesions left buttock and near top of the gluteal fold  in a.m. and bedtime    Wear skirts loose clothing soft clothing for the next 7 to 10 days until wounds heal    Encourage patient to sit more on her right buttock to relieve pressure on the left buttock when she is sitting for periods of time    If possible, please allow patient to wear during the daytime a large urinary incontinence pad and not the briefs to avoid the briefs rubbing on the lesions on  her buttocks    Please make a follow-up appointment with her primary care provider or one of his associates next week-midweek for recheck of wound and long term plan for wound care     If fever, increasing pain, area of buttocks is increasing in size, any pus or drainage, area is starting to ulcerate to ER

## 2024-06-18 DIAGNOSIS — F25.1 SCHIZOAFFECTIVE DISORDER, DEPRESSIVE TYPE (H): ICD-10-CM

## 2024-06-18 DIAGNOSIS — F51.04 PSYCHOPHYSIOLOGICAL INSOMNIA: ICD-10-CM

## 2024-06-18 RX ORDER — CLONAZEPAM 0.5 MG/1
0.5 TABLET ORAL
Qty: 28 TABLET | Refills: 5 | Status: SHIPPED | OUTPATIENT
Start: 2024-06-18 | End: 2024-10-02

## 2024-06-24 DIAGNOSIS — Z79.4 TYPE 2 DIABETES MELLITUS WITH MILD NONPROLIFERATIVE RETINOPATHY WITHOUT MACULAR EDEMA, WITH LONG-TERM CURRENT USE OF INSULIN, UNSPECIFIED LATERALITY (H): ICD-10-CM

## 2024-06-24 DIAGNOSIS — E11.3299 TYPE 2 DIABETES MELLITUS WITH MILD NONPROLIFERATIVE RETINOPATHY WITHOUT MACULAR EDEMA, WITH LONG-TERM CURRENT USE OF INSULIN, UNSPECIFIED LATERALITY (H): ICD-10-CM

## 2024-06-24 RX ORDER — TIRZEPATIDE 7.5 MG/.5ML
INJECTION, SOLUTION SUBCUTANEOUS
Qty: 2 ML | Refills: 1 | Status: SHIPPED | OUTPATIENT
Start: 2024-06-24 | End: 2024-09-03

## 2024-06-25 ENCOUNTER — OFFICE VISIT (OUTPATIENT)
Dept: PHARMACY | Facility: CLINIC | Age: 64
End: 2024-06-25
Payer: COMMERCIAL

## 2024-06-25 DIAGNOSIS — Z79.4 TYPE 2 DIABETES MELLITUS WITH MILD NONPROLIFERATIVE RETINOPATHY WITHOUT MACULAR EDEMA, WITH LONG-TERM CURRENT USE OF INSULIN, UNSPECIFIED LATERALITY (H): Primary | ICD-10-CM

## 2024-06-25 DIAGNOSIS — I10 HTN, GOAL BELOW 140/90: ICD-10-CM

## 2024-06-25 DIAGNOSIS — E11.3299 TYPE 2 DIABETES MELLITUS WITH MILD NONPROLIFERATIVE RETINOPATHY WITHOUT MACULAR EDEMA, WITH LONG-TERM CURRENT USE OF INSULIN, UNSPECIFIED LATERALITY (H): Primary | ICD-10-CM

## 2024-06-25 PROCEDURE — 99606 MTMS BY PHARM EST 15 MIN: CPT | Performed by: PHARMACIST

## 2024-06-25 PROCEDURE — 99607 MTMS BY PHARM ADDL 15 MIN: CPT | Performed by: PHARMACIST

## 2024-06-25 NOTE — Clinical Note
JOSE ALBERTOI, I'll come up for her next appt that's on a Thursday so I can download her meter and follow-up .

## 2024-06-25 NOTE — PATIENT INSTRUCTIONS
"Recommendations from today's MTM visit:                                                         Decrease Humalog to sliding scale as prescribed - wrote this out for staff    Staff to check blood sugar at 10PM and give carbohydrate snack for glucose <100    Follow-up: 1 month    It was great speaking with you today.  I value your experience and would be very thankful for your time in providing feedback in our clinic survey. In the next few days, you may receive an email or text message from [facility] [employer] with a link to a survey related to your  clinical pharmacist.\"     To schedule another MTM appointment, please call the clinic directly or you may call the MTM scheduling line at [phone] or toll-free at [phone].     My Clinical Pharmacist's contact information:                                                      Please feel free to contact me with any questions or concerns you have.      Eugenia De Jesus, PharmD, BCACP   Medication Therapy Management Pharmacist   [facility]  [phone]    "

## 2024-06-25 NOTE — PROGRESS NOTES
Medication Therapy Management (MTM) Encounter    ASSESSMENT:                            Medication Adherence/Access: See below for considerations    Diabetes:   Patient is meeting A1c goal of < 8%.  Patient is meeting goal of > 50% time in target with continuous glucose monitoring.   Patient would benefit from decreasing Humalog as prescribed by endocrinology.  Again spent time with patient on importance of treating hypoglycemia and how to treat.  She is keeping candy in her room now.  Will also have staff check on her blood sugar closer to bedtime and give snack if glucose is running low.        Hypertension: majority of home blood pressure readings are at goal <140/90.  Continue to monitor.    PLAN:                            Decrease Humalog to sliding scale as prescribed - wrote this out for staff    Staff to check blood sugar at 10PM and give carbohydrate snack for glucose <100    Follow-up: 1 month    SUBJECTIVE/OBJECTIVE:                          Nena Tang is a 64 year old female seen for a follow-up visit. Patient was accompanied by group home staff.      Reason for visit: medication recheck.    Allergies/ADRs: Reviewed in chart  Past Medical History: Reviewed in chart  Tobacco: She reports that she has never smoked. She has never used smokeless tobacco.  Alcohol: not currently using    Medication Adherence/Access: no issues reported  Patient has assistance with medication administration: group home.    Diabetes   Lantus 18 units twice daily  Humalog sliding scale 8 units + 3 units per 50mg/dL starting glucose 81 (did not reduce to sliding scale once starting Mounjaro 7.5mg)  Mounjaro 7.5mg weekly   Side effects: Diarrhea the day she takes Mounjaro (3x BM, loose not watery. She is unable to make it to the bathroom, somewhat bothersome to her.  She currently has a pressure ulcer on her buttock and working with the wound clinic to treat. Wound is improving.)    Blood sugar monitoring: Continuous Glucose  Monitor -           Staff is rechecking blood glucose when Dexcom reads that glucose is low. None of the fingerstick glucose readings have been low and often finding the readings are very different.  Her last glucose check of the evening by staff is at 8PM with evening med pass. Nena typically goes to bed at 10-11PM.   Discussed glucose from overnight 6/19-6/20: Nena did not inform the staff that her Dexcom alarm was going off every 5 minutes all night.  She continued to ignore the alarm all night and did not ingest anything to treat hypoglycemia. Staff did check blood sugar but not until the next morning at 9am which was glucose 88.      Current diabetes symptoms: none  Diet/Exercise: lower appetite with Mounjaro.  Happy with weight loss.           Eye exam is up to date  Foot exam is up to date    Hypertension   Amlodipine 10mg daily  Metoprolol XL 50mg AM and 100mg PM  Losartan 100mg daily  Patient reports no current medication side effects.   Patient has blood pressure monitored by group home.  Home BP monitoring :    130/84, 129/88, 113/77, 129/91, 117/77, 148/73, 127/85, 132/80, 140/73, 135/69  Medication history:   Chlorthalidone - stopped while inpatient, not restarted since blood pressure was well controlled.       Today's Vitals: LMP 01/06/2015 (Exact Date)   ----------------      I spent 40 minutes with this patient today. All changes were made via collaborative practice agreement with Albino Rainey MD. A copy of the visit note was provided to the patient's provider(s).    A summary of these recommendations was given to the patient.    Eugenia De Jesus, PharmD, BCACP   Medication Therapy Management Pharmacist   [facility]  [phone]          Medication Therapy Recommendations  Type 2 diabetes mellitus with mild nonproliferative retinopathy (H)    Current Medication: HUMALOG KWIKPEN 100 UNIT/ML soln   Rationale: Does not understand  instructions - Adherence - Adherence   Recommendation: Provide Education   Status: Patient Agreed - Adherence/Education

## 2024-07-08 NOTE — PROGRESS NOTES
"PT Evaluation     Today's date: 2024  Patient name: Nidia Hamlin  : 1955  MRN: [id number]  Referring provider: Jeremiah Romero DO  Dx:   Encounter Diagnosis     ICD-10-CM    1. Strain of neck muscle, initial encounter  S16.1XXA Ambulatory Referral to Physical Therapy      2. Tendonitis of both rotator cuffs  M75.81 Ambulatory Referral to Physical Therapy    M75.82                      Assessment  Impairments: abnormal or restricted ROM, abnormal movement, activity intolerance, impaired physical strength, lacks appropriate home exercise program, pain with function, poor posture , poor body mechanics and activity limitations    Assessment details: Pt Niida Hamlin is a 68 y.o. who presents to OPPT with s/s consistent with chronic cervical and B shoulder pain. Pt presents with limited C/S mobility and cervical strength deficits, decreased B UE ROM and strength, impaired soft tissue mobility/limited flexibility, and poor postural awareness. Pt with limitations with lifting/carrying, reaching OH, driving, and disrupted sleep.  Pt would benefit from skilled therapy services to address outlined impairments, work towards goals, and restore pts PLOF. Thank you!     Goals  STGs to be achieved in 4 weeks:  -Pt to demonstrate reduced subjective pain rating \"at worst\" by at least 2-3 points from Initial Eval to allow for reduced pain with ADLs and improved functional activity tolerance.   -Pt to demonstrate C/S ROM improved > 5* in order to maximize joint mobility and function and allow for progression of exercise program and achievement of goals.   -Pt to demonstrate increased MMT of B UE by at least 1/2 grade in order to improve safety and stability with ADLs and functional mobility.     LTGs to be achieved upon discharge:   -Pt will be I with HEP in order to continue to improve quality of life and independence and reduce risk for re-injury.   -Pt to demonstrate return to activities of daily living without limiations or " "Pt has been napping often today but has been visible at times in the lounge. Pt keeps to herself. Pt reported still hearing some voices but they are \"not as much a yesterday\". Pt's affect has been blunted but she has been receptive and fairly pleasant. Pt still has depression and anxiety around a 4-5. Pt denies active SI. Pt will be discharged tomorrow and will return to her group home.     01/18/18 1506   Behavioral Health   Hallucinations auditory   Thinking distractable   Orientation person: oriented;place: oriented;date: oriented;time: oriented   Memory baseline memory   Insight insight appropriate to situation   Judgement impaired   Eye Contact at examiner   Affect blunted, flat   Mood depressed;anxious   Physical Appearance/Attire untidy;appears stated age   Hygiene neglected grooming - unclean body, hair, teeth   Suicidality (denies)   1. Wish to be Dead No   2. Non-Specific Active Suicidal Thoughts  No   Self Injury (denies)   Elopement (no signs of eloping)   Activity isolative;withdrawn   Speech coherent;clear   Medication Sensitivity no stated side effects;no observed side effects   Psychomotor / Gait balanced;steady;slow   Behavioral Health Interventions   Depression maintain safety precautions;monitor need to revise level of observation;maintain safe secure environment;encourage nutrition and hydration;encourage participation / independence with adls;provide emotional support;establish therapeutic relationship;assist with developing and utilizing healthy coping strategies;build upon strengths;assess patient response to medication;assess medication adherance;monitor need for prn medication;monitor confusion, memory loss, decision making ability and reorient / intervene as needed   Social and Therapeutic Interventions (Depression) encourage socialization with peers;encourage effective boundaries with peers;encourage participation in therapeutic groups and milieu activities     " restrictions.   -Pt will return to lifting/carrying > 10# without limitations or increase in pain.     -Pt to demonstrate improved function as noted by achieving or exceeding predicted score on FOTO outcomes assessment tool.       Plan  Patient would benefit from: skilled physical therapy  Planned modality interventions: cryotherapy and thermotherapy: hydrocollator packs    Planned therapy interventions: abdominal trunk stabilization, balance, manual therapy, neuromuscular re-education, patient education, postural training, therapeutic activities, therapeutic exercise, home exercise program, flexibility and functional ROM exercises    Frequency: 1x week  Duration in weeks: 12  Plan of Care beginning date: 2024  Plan of Care expiration date: 2024  Treatment plan discussed with: patient        Subjective Evaluation    History of Present Illness  Mechanism of injury: Pt reporting that she has been having pain in the neck and both shoulders for the past year without known cause. She has been following with Dr. Romero but has refused steroid injections due to other health concerns. She notes that she had MRI completed on the neck and x-rays completed B shoulders. MD referral to OPPT at this time for conservative management of s/s.  She will return to MD again in 1 month.  Quality of life: good    Patient Goals  Patient goals for therapy: decreased pain, increased motion and independence with ADLs/IADLs    Pain  At best pain ratin  At worst pain ratin  Quality: dull ache, radiating and tight  Relieving factors: rest  Progression: worsening    Social Support  Stairs in house: yes   Lives in: multiple-level home  Lives with: adult grandchildren.      Diagnostic Tests  MRI studies: abnormal  Treatments  Current treatment: physical therapy        Objective     Postural Observations  Seated posture: poor  Standing posture: poor      Palpation   Left   Hypertonic in the middle trapezius and upper trapezius.    Tenderness of the cervical paraspinals, middle trapezius, rhomboids and upper trapezius.     Right   Hypertonic in the middle trapezius and upper trapezius.   Tenderness of the cervical paraspinals, middle trapezius, rhomboids and upper trapezius.     Neurological Testing     Sensation     Shoulder   Left Shoulder   Paresthesia: light touch    Right Shoulder   Paresthesia: Light touch    Active Range of Motion   Cervical/Thoracic Spine       Cervical    Flexion: 45 degrees  with pain  Extension: 45 degrees     with pain  Left lateral flexion: 35 degrees     with pain  Right lateral flexion: 25 degrees     with pain  Left rotation:  with pain Restriction level: minimal  Right rotation:  with pain Restriction level: minimal  Left Shoulder   Flexion: 82 degrees with pain  Abduction: 50 degrees with pain    Right Shoulder   Flexion: 95 degrees with pain  Abduction: 60 degrees with pain    Strength/Myotome Testing     Left Shoulder     Planes of Motion   Flexion: 3-   Abduction: 3-     Right Shoulder     Planes of Motion   Flexion: 3-   Abduction: 3-              Re-eval Date: 8/8/24    Precautions: chronic pain        Manuals 7/8       B shoulder   If tolerable                                 Neuro Re-Ed                                                 Postural ed   HZ       Ther Ex          PRINT OUT HEP      Scapular retractions         Post. shld rolls         C/S ROM        UT stretch         Levator stretch         C/S isometrics         C/S retractions         Standing FF/Abd        SA punches         Supine ABC's         Doorway stretch                                 Ther Activity         TENS home set up                Gait Training                        Modalities

## 2024-07-15 DIAGNOSIS — E11.3299 TYPE 2 DIABETES MELLITUS WITH MILD NONPROLIFERATIVE RETINOPATHY WITHOUT MACULAR EDEMA, WITH LONG-TERM CURRENT USE OF INSULIN, UNSPECIFIED LATERALITY (H): ICD-10-CM

## 2024-07-15 DIAGNOSIS — R21 RASH AND NONSPECIFIC SKIN ERUPTION: ICD-10-CM

## 2024-07-15 DIAGNOSIS — Z79.4 TYPE 2 DIABETES MELLITUS WITH MILD NONPROLIFERATIVE RETINOPATHY WITHOUT MACULAR EDEMA, WITH LONG-TERM CURRENT USE OF INSULIN, UNSPECIFIED LATERALITY (H): ICD-10-CM

## 2024-07-15 RX ORDER — INSULIN ADMIN. SUPPLIES
INSULIN PEN (EA) SUBCUTANEOUS
Qty: 200 EACH | Refills: 0 | Status: SHIPPED | OUTPATIENT
Start: 2024-07-15 | End: 2024-08-15

## 2024-07-15 RX ORDER — NYSTATIN 100000 [USP'U]/G
POWDER TOPICAL
Qty: 60 G | Refills: 0 | Status: SHIPPED | OUTPATIENT
Start: 2024-07-15

## 2024-07-16 DIAGNOSIS — R30.0 DYSURIA: ICD-10-CM

## 2024-07-16 DIAGNOSIS — M25.551 HIP PAIN, RIGHT: ICD-10-CM

## 2024-07-16 RX ORDER — PSEUDOEPHED/ACETAMINOPH/DIPHEN 30MG-500MG
1000 TABLET ORAL 3 TIMES DAILY
Qty: 168 TABLET | OUTPATIENT
Start: 2024-07-16

## 2024-07-17 RX ORDER — ASCORBIC ACID 500 MG
TABLET ORAL
Qty: 112 TABLET | Refills: 1 | Status: SHIPPED | OUTPATIENT
Start: 2024-07-17 | End: 2024-09-10

## 2024-07-18 ENCOUNTER — OFFICE VISIT (OUTPATIENT)
Dept: PHARMACY | Facility: CLINIC | Age: 64
End: 2024-07-18
Payer: COMMERCIAL

## 2024-07-18 ENCOUNTER — OFFICE VISIT (OUTPATIENT)
Dept: FAMILY MEDICINE | Facility: CLINIC | Age: 64
End: 2024-07-18
Payer: COMMERCIAL

## 2024-07-18 VITALS
OXYGEN SATURATION: 98 % | BODY MASS INDEX: 40.59 KG/M2 | HEART RATE: 78 BPM | DIASTOLIC BLOOD PRESSURE: 74 MMHG | HEIGHT: 61 IN | SYSTOLIC BLOOD PRESSURE: 132 MMHG | WEIGHT: 215 LBS | TEMPERATURE: 97.3 F | RESPIRATION RATE: 14 BRPM

## 2024-07-18 DIAGNOSIS — S71.102A OPEN WOUND OF LEFT THIGH, INITIAL ENCOUNTER: ICD-10-CM

## 2024-07-18 DIAGNOSIS — M85.80 OSTEOPENIA, UNSPECIFIED LOCATION: Primary | ICD-10-CM

## 2024-07-18 DIAGNOSIS — Z79.4 TYPE 2 DIABETES MELLITUS WITH MILD NONPROLIFERATIVE RETINOPATHY WITHOUT MACULAR EDEMA, WITH LONG-TERM CURRENT USE OF INSULIN, UNSPECIFIED LATERALITY (H): ICD-10-CM

## 2024-07-18 DIAGNOSIS — I10 HTN, GOAL BELOW 140/90: ICD-10-CM

## 2024-07-18 DIAGNOSIS — D64.9 ANEMIA, UNSPECIFIED TYPE: ICD-10-CM

## 2024-07-18 DIAGNOSIS — E11.3299 TYPE 2 DIABETES MELLITUS WITH MILD NONPROLIFERATIVE RETINOPATHY WITHOUT MACULAR EDEMA, WITH LONG-TERM CURRENT USE OF INSULIN, UNSPECIFIED LATERALITY (H): ICD-10-CM

## 2024-07-18 DIAGNOSIS — E11.3299 TYPE 2 DIABETES MELLITUS WITH MILD NONPROLIFERATIVE RETINOPATHY WITHOUT MACULAR EDEMA, WITH LONG-TERM CURRENT USE OF INSULIN, UNSPECIFIED LATERALITY (H): Primary | ICD-10-CM

## 2024-07-18 DIAGNOSIS — Z79.4 TYPE 2 DIABETES MELLITUS WITH MILD NONPROLIFERATIVE RETINOPATHY WITHOUT MACULAR EDEMA, WITH LONG-TERM CURRENT USE OF INSULIN, UNSPECIFIED LATERALITY (H): Primary | ICD-10-CM

## 2024-07-18 LAB
ANION GAP SERPL CALCULATED.3IONS-SCNC: 8 MMOL/L (ref 7–15)
BASOPHILS # BLD AUTO: 0 10E3/UL (ref 0–0.2)
BASOPHILS NFR BLD AUTO: 0 %
BUN SERPL-MCNC: 10.7 MG/DL (ref 8–23)
CALCIUM SERPL-MCNC: 9 MG/DL (ref 8.8–10.4)
CHLORIDE SERPL-SCNC: 107 MMOL/L (ref 98–107)
CREAT SERPL-MCNC: 0.85 MG/DL (ref 0.51–0.95)
EGFRCR SERPLBLD CKD-EPI 2021: 77 ML/MIN/1.73M2
EOSINOPHIL # BLD AUTO: 0.2 10E3/UL (ref 0–0.7)
EOSINOPHIL NFR BLD AUTO: 3 %
ERYTHROCYTE [DISTWIDTH] IN BLOOD BY AUTOMATED COUNT: 13.4 % (ref 10–15)
GLUCOSE SERPL-MCNC: 97 MG/DL (ref 70–99)
HCO3 SERPL-SCNC: 23 MMOL/L (ref 22–29)
HCT VFR BLD AUTO: 31 % (ref 35–47)
HGB BLD-MCNC: 9.9 G/DL (ref 11.7–15.7)
IMM GRANULOCYTES # BLD: 0 10E3/UL
IMM GRANULOCYTES NFR BLD: 0 %
LYMPHOCYTES # BLD AUTO: 1.6 10E3/UL (ref 0.8–5.3)
LYMPHOCYTES NFR BLD AUTO: 33 %
MCH RBC QN AUTO: 30.9 PG (ref 26.5–33)
MCHC RBC AUTO-ENTMCNC: 31.9 G/DL (ref 31.5–36.5)
MCV RBC AUTO: 97 FL (ref 78–100)
MONOCYTES # BLD AUTO: 0.6 10E3/UL (ref 0–1.3)
MONOCYTES NFR BLD AUTO: 12 %
NEUTROPHILS # BLD AUTO: 2.6 10E3/UL (ref 1.6–8.3)
NEUTROPHILS NFR BLD AUTO: 52 %
PLATELET # BLD AUTO: 195 10E3/UL (ref 150–450)
POTASSIUM SERPL-SCNC: 4.7 MMOL/L (ref 3.4–5.3)
RBC # BLD AUTO: 3.2 10E6/UL (ref 3.8–5.2)
SODIUM SERPL-SCNC: 138 MMOL/L (ref 135–145)
WBC # BLD AUTO: 5 10E3/UL (ref 4–11)

## 2024-07-18 PROCEDURE — 99606 MTMS BY PHARM EST 15 MIN: CPT | Performed by: PHARMACIST

## 2024-07-18 PROCEDURE — 36415 COLL VENOUS BLD VENIPUNCTURE: CPT | Performed by: FAMILY MEDICINE

## 2024-07-18 PROCEDURE — 99214 OFFICE O/P EST MOD 30 MIN: CPT | Performed by: FAMILY MEDICINE

## 2024-07-18 PROCEDURE — 85025 COMPLETE CBC W/AUTO DIFF WBC: CPT | Performed by: FAMILY MEDICINE

## 2024-07-18 PROCEDURE — G2211 COMPLEX E/M VISIT ADD ON: HCPCS | Performed by: FAMILY MEDICINE

## 2024-07-18 PROCEDURE — 99607 MTMS BY PHARM ADDL 15 MIN: CPT | Performed by: PHARMACIST

## 2024-07-18 PROCEDURE — 80048 BASIC METABOLIC PNL TOTAL CA: CPT | Performed by: FAMILY MEDICINE

## 2024-07-18 ASSESSMENT — PATIENT HEALTH QUESTIONNAIRE - PHQ9
10. IF YOU CHECKED OFF ANY PROBLEMS, HOW DIFFICULT HAVE THESE PROBLEMS MADE IT FOR YOU TO DO YOUR WORK, TAKE CARE OF THINGS AT HOME, OR GET ALONG WITH OTHER PEOPLE: NOT DIFFICULT AT ALL
SUM OF ALL RESPONSES TO PHQ QUESTIONS 1-9: 16
SUM OF ALL RESPONSES TO PHQ QUESTIONS 1-9: 16

## 2024-07-18 ASSESSMENT — PAIN SCALES - GENERAL: PAINLEVEL: NO PAIN (0)

## 2024-07-18 NOTE — PROGRESS NOTES
"  Assessment & Plan     Osteopenia  Reviewed the importance of vitamin D and calcium intake today.    HTN, goal below 140/90  Recheck blood pressure was under better control.  BMP will be checked today.  - Basic metabolic panel  (Ca, Cl, CO2, Creat, Gluc, K, Na, BUN); Future  - Basic metabolic panel  (Ca, Cl, CO2, Creat, Gluc, K, Na, BUN)    Type 2 diabetes mellitus with mild nonproliferative retinopathy without macular edema, with long-term current use of insulin, unspecified laterality (H)  Diabetes has been under excellent control.  She is not due for an A1c today.  She has lost weight with use of the current medications and would like to continue at this time.  - Basic metabolic panel  (Ca, Cl, CO2, Creat, Gluc, K, Na, BUN); Future  - Basic metabolic panel  (Ca, Cl, CO2, Creat, Gluc, K, Na, BUN)    Anemia, unspecified type  Has had anemia of unknown etiology.  Will check CBC today.  - CBC with platelets and differential; Future  - CBC with platelets and differential    Open wound of left thigh, initial encounter  Reviewed recent wound care notes.  The person accompanying her today did show me a picture so we did not address it today.  Unclear as to how this wound on the left side developed.  Possibly related to the chair that she uses at the home where she lives.    The longitudinal plan of care for the diagnosis(es)/condition(s) as documented were addressed during this visit. Due to the added complexity in care, I will continue to support Nena in the subsequent management and with ongoing continuity of care.      BMI  Estimated body mass index is 40.62 kg/m  as calculated from the following:    Height as of this encounter: 1.549 m (5' 1\").    Weight as of this encounter: 97.5 kg (215 lb).   Weight management plan: Discussed healthy diet and exercise guidelines      FUTURE APPOINTMENTS:       - Follow-up visit in 6 weeks    Subjective   Nena is a 64 year old, presenting for the following health " "issues:  Follow Up        7/18/2024     9:50 AM   Additional Questions   Roomed by Karma Kerns   Accompanied by Care giver Zackary     History of Present Illness       Reason for visit:  Follow up    She eats 0-1 servings of fruits and vegetables daily.She consumes 3 sweetened beverage(s) daily.She exercises with enough effort to increase her heart rate 10 to 19 minutes per day.  She exercises with enough effort to increase her heart rate 3 or less days per week.   She is taking medications regularly.           Patient comes in today for routine follow-up.  She reports she is feeling well.  She is continue to lose some weight.  Diabetes is under excellent control.  Denies any specific side effects related to the tirzepatide at this time.  Has developed an open wound on the left lateral thigh.  No injury was reported and the person accompanying her did show me a picture of it today and will also forward another 1 through [employer].      Review of Systems  Constitutional, HEENT, cardiovascular, pulmonary, gi and gu systems are negative, except as otherwise noted.      Objective    /74 (BP Location: Right arm, Patient Position: Sitting, Cuff Size: Adult Large)   Pulse 78   Temp 97.3  F (36.3  C) (Temporal)   Resp 14   Ht 1.549 m (5' 1\")   Wt 97.5 kg (215 lb)   LMP 01/06/2015 (Exact Date)   SpO2 98%   BMI 40.62 kg/m    Body mass index is 40.62 kg/m .  Physical Exam   GENERAL: alert and no distress  EYES: Eyes grossly normal to inspection, PERRL and conjunctivae and sclerae normal  NECK: no adenopathy, no asymmetry, masses, or scars  RESP: lungs clear to auscultation - no rales, rhonchi or wheezes  CV: regular rate and rhythm, normal S1 S2, no S3 or S4, no murmur, click or rub, no peripheral edema   MS: no gross musculoskeletal defects noted, no edema  NEURO: Normal strength and tone, mentation intact and speech normal  PSYCH: mentation appears normal, affect normal/bright    Office Visit on 06/06/2024 "   Component Date Value Ref Range Status    Color Urine 06/06/2024 Yellow  Colorless, Straw, Light Yellow, Yellow Final    Appearance Urine 06/06/2024 Clear  Clear Final    Glucose Urine 06/06/2024 Negative  Negative mg/dL Final    Bilirubin Urine 06/06/2024 Negative  Negative Final    Ketones Urine 06/06/2024 Negative  Negative mg/dL Final    Specific Gravity Urine 06/06/2024 1.025  1.003 - 1.035 Final    Blood Urine 06/06/2024 Negative  Negative Final    pH Urine 06/06/2024 6.0  5.0 - 7.0 Final    Protein Albumin Urine 06/06/2024 Trace (A)  Negative mg/dL Final    Urobilinogen Urine 06/06/2024 0.2  0.2, 1.0 E.U./dL Final    Nitrite Urine 06/06/2024 Negative  Negative Final    Leukocyte Esterase Urine 06/06/2024 Trace (A)  Negative Final    Trichomonas 06/06/2024 Absent  Absent Final    Yeast 06/06/2024 Absent  Absent Final    Clue Cells 06/06/2024 Absent  Absent Final    WBCs/high power field 06/06/2024 None  None Final    Bacteria Urine 06/06/2024 None Seen  None Seen /HPF Final    RBC Urine 06/06/2024 None Seen  0-2 /HPF /HPF Final    WBC Urine 06/06/2024 0-5  0-5 /HPF /HPF Final           Signed Electronically by: Albino Rainey MD

## 2024-07-18 NOTE — PROGRESS NOTES
Medication Therapy Management (MTM) Encounter    ASSESSMENT:                            Medication Adherence/Access: No issues identified    Diabetes:   Patient is meeting A1c goal of < 8%.  Patient is meeting goal of > 50% time in target with continuous glucose monitoring.    Again emphasized importance of treating overnight hypoglycemia and discussed with staff.  Low glucose has been infrequent, most nights glucose is .  Lowest glucose noted was 62. Discussed continuing Lantus at the same dose and treating hypoglycemia. Patient and staff are in agreement, will monitor. If overnight hypoglycemia continues to occur, will reduce dose of Lantus.     Hypertension:   Stable on recheck, continue to monitor.     PLAN:                            Group home encouraged to continue supporting patient in treatment of hypoglycemia    Follow-up: 2 months    SUBJECTIVE/OBJECTIVE:                          Nena Tang is a 64 year old female seen for a follow-up visit. Today's visit is a co-visit with Steve Rainey MD.  Accompanied by group home staff.     Reason for visit: medication recheck.    Allergies/ADRs: Reviewed in chart  Past Medical History: Reviewed in chart  Tobacco: She reports that she has never smoked. She has never used smokeless tobacco.  Alcohol: not currently using    Medication Adherence/Access: no issues reported  Patient has assistance with medication administration: group home.    Diabetes   Lantus 18 units twice daily  Humalog sliding scale 3 units per 50mg/dL starting glucose 151  Mounjaro 7.5mg weekly   Side effects: Diarrhea the day she takes Mounjaro (3x BM, loose not watery. She is unable to make it to the bathroom, somewhat bothersome to her.  Has been improving, diarrhea no longer lasting as long.)    Blood sugar monitoring: Continuous Glucose Monitor -         Discussed overnight low glucose again at today's visit, per Dexcom hypoglycemia noted on 7/10. Nena continues to ignore the  "overnight alarm notification of low glucose. Staff is checking on her but if patient declines treatment, they are not insisting that she correct glucose with oral intake. She does keep candy in her room.     Current diabetes symptoms: none  Diet/Exercise: lower appetite with Mounjaro.  Happy with weight loss.  She recently went on an outing to a candy store, eating more candy this week.        Eye exam is up to date  Foot exam is up to date    Hypertension   Amlodipine 10mg daily  Metoprolol XL 50mg AM and 100mg PM  Losartan 100mg daily  Patient reports no current medication side effects.   Patient has blood pressure monitored by group home.  Home BP monitoring :  148/92 yesterday. Did not bring other values.             Today's Vitals: LMP 01/06/2015 (Exact Date)   BP Readings from Last 1 Encounters:   07/18/24 132/74     Pulse Readings from Last 1 Encounters:   07/18/24 78     Wt Readings from Last 1 Encounters:   07/18/24 215 lb (97.5 kg)     Ht Readings from Last 1 Encounters:   07/18/24 5' 1\" (1.549 m)     Estimated body mass index is 40.62 kg/m  as calculated from the following:    Height as of an earlier encounter on 7/18/24: 5' 1\" (1.549 m).    Weight as of an earlier encounter on 7/18/24: 215 lb (97.5 kg).    Temp Readings from Last 1 Encounters:   07/18/24 97.3  F (36.3  C) (Temporal)      ----------------      I spent 30 minutes with this patient today. All changes were made via collaborative practice agreement with Albino Rainey MD. A copy of the visit note was provided to the patient's provider(s).    A summary of these recommendations was sent via [employer].    Eugenia De Jesus, PharmD, BCACP   Medication Therapy Management Pharmacist   [facility]  [phone]          Medication Therapy Recommendations  No medication therapy recommendations to display   "

## 2024-07-18 NOTE — PATIENT INSTRUCTIONS
"Recommendations from today's MTM visit:                                                         If your Dexcom alarm goes off overnight, it is critically important to treat your low blood sugar. Please at least eat some candy that's by your bedside and allow staff to help you.     Follow-up: 2 months    It was great speaking with you today.  I value your experience and would be very thankful for your time in providing feedback in our clinic survey. In the next few days, you may receive an email or text message from [facility] [employer] with a link to a survey related to your  clinical pharmacist.\"     To schedule another MTM appointment, please call the clinic directly or you may call the MTM scheduling line at [phone] or toll-free at [phone].     My Clinical Pharmacist's contact information:                                                      Please feel free to contact me with any questions or concerns you have.      Eugenia De Jesus, PharmD, [facility]   Medication Therapy Management Pharmacist   [facility]  [phone]    "

## 2024-07-19 ENCOUNTER — MYC MEDICAL ADVICE (OUTPATIENT)
Dept: FAMILY MEDICINE | Facility: CLINIC | Age: 64
End: 2024-07-19
Payer: COMMERCIAL

## 2024-07-22 DIAGNOSIS — R19.7 DIARRHEA, UNSPECIFIED TYPE: ICD-10-CM

## 2024-07-22 RX ORDER — LOPERAMIDE HCL 2 MG
CAPSULE ORAL
Qty: 30 CAPSULE | Refills: 1 | Status: SHIPPED | OUTPATIENT
Start: 2024-07-22

## 2024-07-25 DIAGNOSIS — M25.551 HIP PAIN, RIGHT: ICD-10-CM

## 2024-07-25 RX ORDER — PSEUDOEPHED/ACETAMINOPH/DIPHEN 30MG-500MG
1000 TABLET ORAL 3 TIMES DAILY
Qty: 168 TABLET | Refills: 4 | Status: SHIPPED | OUTPATIENT
Start: 2024-07-25

## 2024-08-02 ENCOUNTER — ALLIED HEALTH/NURSE VISIT (OUTPATIENT)
Dept: EDUCATION SERVICES | Facility: CLINIC | Age: 64
End: 2024-08-02
Payer: COMMERCIAL

## 2024-08-02 VITALS — WEIGHT: 213.2 LBS | BODY MASS INDEX: 40.28 KG/M2

## 2024-08-02 DIAGNOSIS — N18.30 TYPE 2 DIABETES MELLITUS WITH STAGE 3 CHRONIC KIDNEY DISEASE, WITH LONG-TERM CURRENT USE OF INSULIN, UNSPECIFIED WHETHER STAGE 3A OR 3B CKD (H): ICD-10-CM

## 2024-08-02 DIAGNOSIS — Z79.4 TYPE 2 DIABETES MELLITUS WITH STAGE 3 CHRONIC KIDNEY DISEASE, WITH LONG-TERM CURRENT USE OF INSULIN, UNSPECIFIED WHETHER STAGE 3A OR 3B CKD (H): ICD-10-CM

## 2024-08-02 DIAGNOSIS — E11.22 TYPE 2 DIABETES MELLITUS WITH STAGE 3 CHRONIC KIDNEY DISEASE, WITH LONG-TERM CURRENT USE OF INSULIN, UNSPECIFIED WHETHER STAGE 3A OR 3B CKD (H): ICD-10-CM

## 2024-08-02 DIAGNOSIS — Z79.4 TYPE 2 DIABETES MELLITUS WITH MILD NONPROLIFERATIVE RETINOPATHY WITHOUT MACULAR EDEMA, WITH LONG-TERM CURRENT USE OF INSULIN, UNSPECIFIED LATERALITY (H): Primary | ICD-10-CM

## 2024-08-02 DIAGNOSIS — E11.3299 TYPE 2 DIABETES MELLITUS WITH MILD NONPROLIFERATIVE RETINOPATHY WITHOUT MACULAR EDEMA, WITH LONG-TERM CURRENT USE OF INSULIN, UNSPECIFIED LATERALITY (H): Primary | ICD-10-CM

## 2024-08-02 PROCEDURE — G0108 DIAB MANAGE TRN  PER INDIV: HCPCS | Performed by: DIETITIAN, REGISTERED

## 2024-08-02 RX ORDER — PROCHLORPERAZINE 25 MG/1
SUPPOSITORY RECTAL
Qty: 9 EACH | Refills: 4 | Status: SHIPPED | OUTPATIENT
Start: 2024-08-02

## 2024-08-02 RX ORDER — PROCHLORPERAZINE 25 MG/1
SUPPOSITORY RECTAL
Qty: 1 EACH | Refills: 4 | Status: SHIPPED | OUTPATIENT
Start: 2024-08-02

## 2024-08-02 RX ORDER — INSULIN GLARGINE 100 [IU]/ML
15 INJECTION, SOLUTION SUBCUTANEOUS 2 TIMES DAILY
Qty: 45 ML | Refills: 1 | Status: SHIPPED | OUTPATIENT
Start: 2024-08-02 | End: 2024-10-02

## 2024-08-02 RX ORDER — INSULIN LISPRO 100 [IU]/ML
0-12 INJECTION, SOLUTION INTRAVENOUS; SUBCUTANEOUS
Qty: 15 ML | Refills: 1 | Status: SHIPPED | OUTPATIENT
Start: 2024-08-02

## 2024-08-02 NOTE — PATIENT INSTRUCTIONS
Jordon Barrett,    It was nice meeting with you today. Here is a summary of our visit and plan:    Plan:  Please decrease Lantus to 15 units twice daily.  If still low in the mornings (<90,) please reduce both the morning and evening doses by 1 unit each time that you see this.  If needed, call clinic for updated order  When you finish all of your Mounjaro 7.5 mg pens, increase to Mounjaro 10 mg weekly  When starting Mounjaro 10 mg weekly, take the following actions:  REDUCE Humalog with meals:     BG 70 - 150:  0 units  151 - 200: 2 units  201 - 250: 4 units  251-300: 6 units  301 - 350: 8 units  351 or greater : 10 units           REDUCE:  BEDTIME-If >4 hours since last Humalog injection     For  - 249 give 1 units.   For  - 299 give 2 units.   For  - 349 give 3 units.   For BG = or > 350 give 4 units.       Please call or [employer] if you have any questions.    Contact information:   If you have concerns, please send a [employer] message or call the clinic at [phone].    For more urgent concerns that do not require 392, please call [phone] after hours/weekends and ask to speak with the Endocrinologist on call.    To schedule a Diabetes Education appointment, call [phone]    Please let us know if you are having low blood sugars less than 70 or over 300 mg/dL.  Do not wait until your next appointment if this is happening.        Suzie HICKS [facility] [place]  Diabetes Educator  [address] [address]  Phone: [phone]  Email: [email or web address]

## 2024-08-02 NOTE — PROGRESS NOTES
"Diabetes Self-Management Education & Support    Nena Tang is a 64 year old who presents today for education related to Type 2 Diabetes  Patient is being treated with:  diet, exercise, insulin injections, and Mounjaro  She is accompanied by self and Caregiver from facility      Referring provider:  Chanell Duque PA-C  Living Situation: facility      PATIENT CONCERNS RELATED TO DIABETES SELF MANAGEMENT: some overnight lows, post-meal spikes but it may be due to eating take-out/fast food      SUBJECTIVE / OBJECTIVE:    Wakes up around 6:30 am  Sleeps at 10:30 pm  May wake up later over the weekend     Monitoring:       CGM: Dexcom G6      Labs:  Lab Results   Component Value Date    A1C 5.5 05/21/2024    A1C 7.0 09/21/2023    A1C 9.1 12/01/2020     Lab Results   Component Value Date    GLC 97 07/18/2024     09/27/2023     09/27/2022     06/15/2021     Lab Results   Component Value Date    LDL 81 09/21/2023     12/01/2020     HDL Cholesterol   Date Value Ref Range Status   12/01/2020 46 (L) >49 mg/dL Final     Direct Measure HDL   Date Value Ref Range Status   09/21/2023 68 >=50 mg/dL Final     GFR Estimate   Date Value Ref Range Status   07/18/2024 77 >60 mL/min/1.73m2 Final     Comment:     eGFR calculated using 2021 CKD-EPI equation.   06/15/2021 83 >60 mL/min/[1.73_m2] Final     Comment:     Non  GFR Calc  Starting 12/18/2018, serum creatinine based estimated GFR (eGFR) will be   calculated using the Chronic Kidney Disease Epidemiology Collaboration   (CKD-EPI) equation.       Lab Results   Component Value Date    CR 0.85 07/18/2024    CR 0.78 06/15/2021     No results found for: \"MICROALBUMIN\"  No results found for: \"GADAB\"  C Peptide   Date Value Ref Range Status   07/28/2009 1.4 0.9 - 6.9 ng/mL Final         Diabetes Symptoms & Complications:            Taking Medications:    7.5 Mounjaro Weekly  18 units of Lantus twice daily   Humalog:  BG 70 - 150:  0 " units  151 - 200: 3 units  201 - 250: 6 units  251-300: 9 units  301 - 350: 12 units  351 or greater : 15 units       Problem Solving:             Patient is at risk of hypoglycemia?: YES and Frequency is one to two times per week, usually at night. Doesn't always feels symptoms and may acknowledge low alarm before going back to sleep  Hypoglycemia symptoms: shaky, dizzy, confusion  Hospitalizations for hyper or hypoglycemia: No    Reducing Risks:       Patient's most recent   Lab Results   Component Value Date    A1C 5.5 05/21/2024    A1C 6.2 01/30/2024    A1C 7.0 09/21/2023    A1C 6.7 06/20/2023    A1C 9.1 12/01/2020    A1C 9.7 09/15/2020      Patient's A1C goal: <7.0    GMI of 6.9 over past 14 days    Being Active:   Some walking  Attends adult day program      Nutrition:      Early dinner   Lunch at day program is turkey sandwich, diet pepsi  Breakfast is two eggs, coffee, cream and sugar  Dinner is protein/veg/fruit. Trying to eat smaller portions  Group home sometimes gets takeout for all residents - [employer] is a population option  Does not snack      Healthy Coping:     Sources of stress identified by patient: My health      EDUCATION and INSTRUCTION PROVIDED AT THIS VISIT:     T2D seen for Comprehensive Knowledge Assessment and Instruction at the request of Chanell Duque PA-C. Lou is here with her caregiver today. She says she is feeling great. Lost almost 37 lb so far on Mounjaro and feels more energetic and is able to move around better. No particular concerns about her blood sugars. Her caregiver from the facility is concerned about overnight lows. For lows they double check with blood glucose meter and sometimes the Dexcom G6 will read 60s while blood glucose meter will be 80s. Discussed discrepancy between blood glucose meter and sensor and encouraged her to trust BG meter.    Dexcom Clarity reports reviewed - Time in Range (70-180mg/dL) is 70% which is meeting goal, time below range is 4%. Will  lower Lantus doses given pattern of overnight lows. Will lower Humalog doses when she increases Mounjaro dose to 10 mg weekly. She has not needed much Humalog, taking up to 6 units before meals at most    Patient-stated goal written and given to Nena Tang.  Verbalized and demonstrated understanding of instructions.     Plan:  Please decrease Lantus to 15 units twice daily.  If still low in the mornings (<90,) please reduce both the morning and evening doses by 1 unit each time that you see this.  If needed, call clinic for updated order  When you finish all of your Mounjaro 7.5 mg pens, increase to Mounjaro 10 mg weekly  When starting Mounjaro 10 mg weekly, take the following actions:  REDUCE Humalog with meals:     BG 70 - 150:  0 units  151 - 200: 2 units  201 - 250: 4 units  251-300: 6 units  301 - 350: 8 units  351 or greater : 10 units           REDUCE:  BEDTIME-If >4 hours since last Humalog injection     For  - 249 give 1 units.   For  - 299 give 2 units.   For  - 349 give 3 units.   For BG = or > 350 give 4 units.         FOLLOW-UP:    With PCP on 9/3  With Chanell Duque PA-C on 10/29  Declined follow-up with Diabetes Education        See patient instructions.  AVS provided to patient.    Time spent with patient at today's visit was 40 minutes.      Any diabetes medication initiation or dose changes were made via the CDCES Standing Orders per the patient's referring provider. A copy of this encounter was shared with the provider.

## 2024-08-13 DIAGNOSIS — R35.0 URINARY FREQUENCY: ICD-10-CM

## 2024-08-13 DIAGNOSIS — M25.50 POLYARTHRALGIA: ICD-10-CM

## 2024-08-13 DIAGNOSIS — K59.00 CONSTIPATION, UNSPECIFIED CONSTIPATION TYPE: ICD-10-CM

## 2024-08-13 RX ORDER — MEDICAL SUPPLY, MISCELLANEOUS
EACH MISCELLANEOUS
Qty: 56 TABLET | OUTPATIENT
Start: 2024-08-13

## 2024-08-13 NOTE — TELEPHONE ENCOUNTER
Requested Prescriptions   Pending Prescriptions Disp Refills    leflunomide (ARAVA) 20 MG tablet 90 tablet 1     Sig: Take 1 tablet (20 mg) by mouth daily Labs every 8-12 weeks       There is no refill protocol information for this order            Last Written Prescription Date:  2/28/2024  Last Fill Quantity: 90 tablet,  # refills: 1   Last office visit: Visit date not found ; last virtual visit: Visit date not found with prescribing provider: Alexus Posey MD   Future Office Visit: None  Next 5 appointments (look out 90 days)      Sep 03, 2024 10:30 AM  [facility] [address] [place] [id number] [phone]

## 2024-08-14 DIAGNOSIS — Z79.4 TYPE 2 DIABETES MELLITUS WITH MILD NONPROLIFERATIVE RETINOPATHY WITHOUT MACULAR EDEMA, WITH LONG-TERM CURRENT USE OF INSULIN, UNSPECIFIED LATERALITY (H): ICD-10-CM

## 2024-08-14 DIAGNOSIS — E11.3299 TYPE 2 DIABETES MELLITUS WITH MILD NONPROLIFERATIVE RETINOPATHY WITHOUT MACULAR EDEMA, WITH LONG-TERM CURRENT USE OF INSULIN, UNSPECIFIED LATERALITY (H): ICD-10-CM

## 2024-08-14 RX ORDER — LEFLUNOMIDE 20 MG/1
20 TABLET ORAL DAILY
Qty: 90 TABLET | Refills: 0 | Status: SHIPPED | OUTPATIENT
Start: 2024-08-14

## 2024-08-14 NOTE — TELEPHONE ENCOUNTER
Last Written Prescription Date:  2/28/24  Last Fill Quantity: 90,  # refills: 1   Last office visit: Visit date not found ; last virtual visit: Visit date not found with prescribing provider:  Kalpesh   Future Office Visit: no follow up scheduled with Dr Posey    CBC 7/18, BMP 7/18, LFT's 4/2/24.  DX: Positive AIDA (antinuclear antibody)  (primary encounter diagnosis)   Centromere antibody positive. Plan: Continue leflunomide 20 mg daily.     Requested Prescriptions   Pending Prescriptions Disp Refills    leflunomide (ARAVA) 20 MG tablet 90 tablet 1     Sig: Take 1 tablet (20 mg) by mouth daily Labs every 8-12 weeks       There is no refill protocol information for this order        Isabell Casas RN

## 2024-08-15 RX ORDER — PEN NEEDLE, DIABETIC, SAFETY 30 GX3/16"
NEEDLE, DISPOSABLE MISCELLANEOUS
Qty: 200 EACH | Refills: 10 | Status: SHIPPED | OUTPATIENT
Start: 2024-08-15

## 2024-08-21 ENCOUNTER — MYC REFILL (OUTPATIENT)
Dept: FAMILY MEDICINE | Facility: CLINIC | Age: 64
End: 2024-08-21
Payer: COMMERCIAL

## 2024-08-21 DIAGNOSIS — F51.04 PSYCHOPHYSIOLOGICAL INSOMNIA: ICD-10-CM

## 2024-08-21 DIAGNOSIS — F25.1 SCHIZOAFFECTIVE DISORDER, DEPRESSIVE TYPE (H): ICD-10-CM

## 2024-08-21 DIAGNOSIS — K59.00 CONSTIPATION, UNSPECIFIED CONSTIPATION TYPE: ICD-10-CM

## 2024-08-21 RX ORDER — CLONAZEPAM 0.5 MG/1
0.5 TABLET ORAL
Qty: 28 TABLET | Refills: 5 | OUTPATIENT
Start: 2024-08-21

## 2024-08-21 RX ORDER — MEDICAL SUPPLY, MISCELLANEOUS
EACH MISCELLANEOUS
Qty: 56 TABLET | OUTPATIENT
Start: 2024-08-21

## 2024-08-22 ENCOUNTER — MYC REFILL (OUTPATIENT)
Dept: UROLOGY | Facility: CLINIC | Age: 64
End: 2024-08-22
Payer: COMMERCIAL

## 2024-08-22 ENCOUNTER — MYC REFILL (OUTPATIENT)
Dept: FAMILY MEDICINE | Facility: CLINIC | Age: 64
End: 2024-08-22
Payer: COMMERCIAL

## 2024-08-22 ENCOUNTER — TELEPHONE (OUTPATIENT)
Dept: FAMILY MEDICINE | Facility: CLINIC | Age: 64
End: 2024-08-22

## 2024-08-22 DIAGNOSIS — R35.0 URINARY FREQUENCY: ICD-10-CM

## 2024-08-22 DIAGNOSIS — K59.00 CONSTIPATION, UNSPECIFIED CONSTIPATION TYPE: ICD-10-CM

## 2024-08-22 RX ORDER — MIRABEGRON 50 MG/1
50 TABLET, EXTENDED RELEASE ORAL DAILY
Qty: 90 TABLET | Refills: 4 | OUTPATIENT
Start: 2024-08-22

## 2024-08-22 RX ORDER — AMOXICILLIN 250 MG
CAPSULE ORAL
Qty: 90 TABLET | Refills: 1 | OUTPATIENT
Start: 2024-08-22

## 2024-08-25 ENCOUNTER — HEALTH MAINTENANCE LETTER (OUTPATIENT)
Age: 64
End: 2024-08-25

## 2024-08-26 NOTE — TELEPHONE ENCOUNTER
PA Initiation    Medication: LIDOCAINE 4 % EX PTCH  Insurance Company: [employer] Non-Specialty PA's - Phone [phone] Fax [phone]  Pharmacy Filling the Rx: [id number] - [place], [place] - [address] [place] [address]  Filling Pharmacy Phone: [phone]  Filling Pharmacy Fax:    Start Date: 8/26/2024

## 2024-08-27 RX ORDER — MIRABEGRON 50 MG/1
50 TABLET, EXTENDED RELEASE ORAL DAILY
Qty: 28 TABLET | Refills: 1 | Status: SHIPPED | OUTPATIENT
Start: 2024-08-27

## 2024-08-27 NOTE — TELEPHONE ENCOUNTER
Prior Authorization Approval    Medication: LIDOCAINE 4 % EX PTCH  Authorization Effective Date: 7/27/2024  Authorization Expiration Date: 8/26/2025  Approved Dose/Quantity: Lidocaine 4% Patches  Reference #: [id number]   Insurance Company: [employer]-Specialty PA's - Phone [phone] Fax [phone]  Which Pharmacy is filling the prescription: [id number] [address]  Pharmacy Notified: YES   Patient Notified: NO advised Filling Pharmacy to notifiy pt of when ready

## 2024-09-03 ENCOUNTER — OFFICE VISIT (OUTPATIENT)
Dept: FAMILY MEDICINE | Facility: CLINIC | Age: 64
End: 2024-09-03
Attending: FAMILY MEDICINE
Payer: COMMERCIAL

## 2024-09-03 ENCOUNTER — OFFICE VISIT (OUTPATIENT)
Dept: PHARMACY | Facility: CLINIC | Age: 64
End: 2024-09-03
Payer: COMMERCIAL

## 2024-09-03 VITALS
TEMPERATURE: 97.3 F | OXYGEN SATURATION: 97 % | SYSTOLIC BLOOD PRESSURE: 144 MMHG | HEIGHT: 61 IN | HEART RATE: 78 BPM | DIASTOLIC BLOOD PRESSURE: 70 MMHG | WEIGHT: 206.5 LBS | BODY MASS INDEX: 38.99 KG/M2 | RESPIRATION RATE: 16 BRPM

## 2024-09-03 DIAGNOSIS — E11.22 TYPE 2 DIABETES MELLITUS WITH STAGE 3A CHRONIC KIDNEY DISEASE, WITH LONG-TERM CURRENT USE OF INSULIN (H): ICD-10-CM

## 2024-09-03 DIAGNOSIS — M81.0 OSTEOPOROSIS WITHOUT CURRENT PATHOLOGICAL FRACTURE, UNSPECIFIED OSTEOPOROSIS TYPE: ICD-10-CM

## 2024-09-03 DIAGNOSIS — N18.31 TYPE 2 DIABETES MELLITUS WITH STAGE 3A CHRONIC KIDNEY DISEASE, WITH LONG-TERM CURRENT USE OF INSULIN (H): ICD-10-CM

## 2024-09-03 DIAGNOSIS — Z79.4 TYPE 2 DIABETES MELLITUS WITH MILD NONPROLIFERATIVE RETINOPATHY WITHOUT MACULAR EDEMA, WITH LONG-TERM CURRENT USE OF INSULIN, UNSPECIFIED LATERALITY (H): Primary | ICD-10-CM

## 2024-09-03 DIAGNOSIS — Z12.31 ENCOUNTER FOR SCREENING MAMMOGRAM FOR BREAST CANCER: ICD-10-CM

## 2024-09-03 DIAGNOSIS — E11.3299 TYPE 2 DIABETES MELLITUS WITH MILD NONPROLIFERATIVE RETINOPATHY WITHOUT MACULAR EDEMA, WITH LONG-TERM CURRENT USE OF INSULIN, UNSPECIFIED LATERALITY (H): Primary | ICD-10-CM

## 2024-09-03 DIAGNOSIS — I10 HTN, GOAL BELOW 140/90: ICD-10-CM

## 2024-09-03 DIAGNOSIS — Z79.4 TYPE 2 DIABETES MELLITUS WITH MILD NONPROLIFERATIVE RETINOPATHY WITHOUT MACULAR EDEMA, WITH LONG-TERM CURRENT USE OF INSULIN, UNSPECIFIED LATERALITY (H): ICD-10-CM

## 2024-09-03 DIAGNOSIS — E11.3299 TYPE 2 DIABETES MELLITUS WITH MILD NONPROLIFERATIVE RETINOPATHY WITHOUT MACULAR EDEMA, WITH LONG-TERM CURRENT USE OF INSULIN, UNSPECIFIED LATERALITY (H): ICD-10-CM

## 2024-09-03 DIAGNOSIS — Z79.4 TYPE 2 DIABETES MELLITUS WITH STAGE 3A CHRONIC KIDNEY DISEASE, WITH LONG-TERM CURRENT USE OF INSULIN (H): ICD-10-CM

## 2024-09-03 DIAGNOSIS — I25.10 CORONARY ARTERY DISEASE INVOLVING NATIVE CORONARY ARTERY OF NATIVE HEART WITHOUT ANGINA PECTORIS: ICD-10-CM

## 2024-09-03 DIAGNOSIS — M81.0 AGE-RELATED OSTEOPOROSIS WITHOUT CURRENT PATHOLOGICAL FRACTURE: Primary | ICD-10-CM

## 2024-09-03 LAB
ALBUMIN SERPL BCG-MCNC: 4.3 G/DL (ref 3.5–5.2)
ALP SERPL-CCNC: 72 U/L (ref 40–150)
ALT SERPL W P-5'-P-CCNC: 22 U/L (ref 0–50)
ANION GAP SERPL CALCULATED.3IONS-SCNC: 10 MMOL/L (ref 7–15)
AST SERPL W P-5'-P-CCNC: 22 U/L (ref 0–45)
BASOPHILS # BLD AUTO: 0 10E3/UL (ref 0–0.2)
BASOPHILS NFR BLD AUTO: 0 %
BILIRUB SERPL-MCNC: 0.2 MG/DL
BUN SERPL-MCNC: 12 MG/DL (ref 8–23)
CALCIUM SERPL-MCNC: 9.5 MG/DL (ref 8.8–10.4)
CHLORIDE SERPL-SCNC: 104 MMOL/L (ref 98–107)
CHOLEST SERPL-MCNC: 140 MG/DL
CREAT SERPL-MCNC: 0.89 MG/DL (ref 0.51–0.95)
EGFRCR SERPLBLD CKD-EPI 2021: 72 ML/MIN/1.73M2
EOSINOPHIL # BLD AUTO: 0.1 10E3/UL (ref 0–0.7)
EOSINOPHIL NFR BLD AUTO: 2 %
ERYTHROCYTE [DISTWIDTH] IN BLOOD BY AUTOMATED COUNT: 13 % (ref 10–15)
FASTING STATUS PATIENT QL REPORTED: ABNORMAL
FASTING STATUS PATIENT QL REPORTED: ABNORMAL
GLUCOSE SERPL-MCNC: 100 MG/DL (ref 70–99)
HBA1C MFR BLD: 5.3 % (ref 0–5.6)
HCO3 SERPL-SCNC: 24 MMOL/L (ref 22–29)
HCT VFR BLD AUTO: 31.9 % (ref 35–47)
HDLC SERPL-MCNC: 43 MG/DL
HGB BLD-MCNC: 10.3 G/DL (ref 11.7–15.7)
IMM GRANULOCYTES # BLD: 0 10E3/UL
IMM GRANULOCYTES NFR BLD: 0 %
LDLC SERPL CALC-MCNC: 69 MG/DL
LYMPHOCYTES # BLD AUTO: 2 10E3/UL (ref 0.8–5.3)
LYMPHOCYTES NFR BLD AUTO: 36 %
MCH RBC QN AUTO: 31.2 PG (ref 26.5–33)
MCHC RBC AUTO-ENTMCNC: 32.3 G/DL (ref 31.5–36.5)
MCV RBC AUTO: 97 FL (ref 78–100)
MONOCYTES # BLD AUTO: 0.6 10E3/UL (ref 0–1.3)
MONOCYTES NFR BLD AUTO: 11 %
NEUTROPHILS # BLD AUTO: 2.8 10E3/UL (ref 1.6–8.3)
NEUTROPHILS NFR BLD AUTO: 51 %
NONHDLC SERPL-MCNC: 97 MG/DL
PLATELET # BLD AUTO: 207 10E3/UL (ref 150–450)
POTASSIUM SERPL-SCNC: 4.3 MMOL/L (ref 3.4–5.3)
PROT SERPL-MCNC: 7.3 G/DL (ref 6.4–8.3)
RBC # BLD AUTO: 3.3 10E6/UL (ref 3.8–5.2)
SODIUM SERPL-SCNC: 138 MMOL/L (ref 135–145)
TRIGL SERPL-MCNC: 142 MG/DL
VIT D+METAB SERPL-MCNC: 17 NG/ML (ref 20–50)
WBC # BLD AUTO: 5.5 10E3/UL (ref 4–11)

## 2024-09-03 PROCEDURE — 80053 COMPREHEN METABOLIC PANEL: CPT | Performed by: FAMILY MEDICINE

## 2024-09-03 PROCEDURE — 85025 COMPLETE CBC W/AUTO DIFF WBC: CPT | Performed by: FAMILY MEDICINE

## 2024-09-03 PROCEDURE — 36415 COLL VENOUS BLD VENIPUNCTURE: CPT | Performed by: FAMILY MEDICINE

## 2024-09-03 PROCEDURE — 99606 MTMS BY PHARM EST 15 MIN: CPT | Performed by: PHARMACIST

## 2024-09-03 PROCEDURE — 99607 MTMS BY PHARM ADDL 15 MIN: CPT | Performed by: PHARMACIST

## 2024-09-03 PROCEDURE — 99214 OFFICE O/P EST MOD 30 MIN: CPT | Performed by: FAMILY MEDICINE

## 2024-09-03 PROCEDURE — G2211 COMPLEX E/M VISIT ADD ON: HCPCS | Performed by: FAMILY MEDICINE

## 2024-09-03 PROCEDURE — 80061 LIPID PANEL: CPT | Performed by: FAMILY MEDICINE

## 2024-09-03 PROCEDURE — 83036 HEMOGLOBIN GLYCOSYLATED A1C: CPT | Performed by: FAMILY MEDICINE

## 2024-09-03 PROCEDURE — 82306 VITAMIN D 25 HYDROXY: CPT | Performed by: FAMILY MEDICINE

## 2024-09-03 RX ORDER — TIRZEPATIDE 7.5 MG/.5ML
7.5 INJECTION, SOLUTION SUBCUTANEOUS WEEKLY
Qty: 2 ML | Refills: 1 | Status: SHIPPED | OUTPATIENT
Start: 2024-09-03

## 2024-09-03 ASSESSMENT — PAIN SCALES - GENERAL: PAINLEVEL: NO PAIN (0)

## 2024-09-03 NOTE — PROGRESS NOTES
Assessment & Plan     Age-related osteoporosis without current pathological fracture  Currently on alendronate.  Will check vitamin D level at this time and recheck bone density scan to assess effectiveness of the alendronate.  - DX Bone Density; Future  - Vitamin D Deficiency; Future  - Vitamin D Deficiency    Type 2 diabetes mellitus with stage 3a chronic kidney disease, with long-term current use of insulin (H)  87% of sugars are at goal as downloaded today.  Will check A1c and CMP today.  Lantus dose was adjusted down to 24 units once daily from 15 units twice daily.  - HEMOGLOBIN A1C; Future  - COMPREHENSIVE METABOLIC PANEL; Future  - HEMOGLOBIN A1C  - COMPREHENSIVE METABOLIC PANEL    Coronary artery disease involving native coronary artery of native heart without angina pectoris  Currently no anginal symptoms and will recheck lipid panel at this time.  - Lipid panel reflex to direct LDL Non-fasting; Future  - Lipid panel reflex to direct LDL Non-fasting    Encounter for screening mammogram for breast cancer  Order was placed for screening mammogram as she is due.  - MA Screen Bilateral w/Yaya; Future    Type 2 diabetes mellitus with mild nonproliferative retinopathy without macular edema, with long-term current use of insulin, unspecified laterality (H)  Weight loss has been significant.  She has recently experienced some diarrhea with increasing the doses of tirzepatide.  Endocrinology had recently sent in a 10 mg dose but the patient prefers to stay at 7.5 mg at this time.  Follow-up is scheduled with me in about 2 months and she has endocrinology follow-up shortly before that.  - tirzepatide (MOUNJARO) 7.5 MG/0.5ML pen; Inject 7.5 mg subcutaneously once a week. Patient will be staying on 7.5 mg weekly dose, not increasing to 10 mg as may have been ordered.      The longitudinal plan of care for the diagnosis(es)/condition(s) as documented were addressed during this visit. Due to the added complexity in  "care, I will continue to support Nena in the subsequent management and with ongoing continuity of care.      FUTURE APPOINTMENTS:       - Follow-up visit in 2 months    Subjective   Nena is a 64 year old, presenting for the following health issues:  Follow Up, Diabetes, and Wound Check        9/3/2024    10:20 AM   Additional Questions   Roomed by heath   Accompanied by Care giver justin     History of Present Illness       Reason for visit:  Check up    She eats 2-3 servings of fruits and vegetables daily.She consumes 2 sweetened beverage(s) daily.She exercises with enough effort to increase her heart rate 10 to 19 minutes per day.  She exercises with enough effort to increase her heart rate 3 or less days per week.   She is taking medications regularly.           Patient is seen today with staff from her group home.  The patient feels she is doing very well at this time.  We reviewed the wound consult notes most recently from about 3 weeks ago which document that the size of the wound is about half of what it was previously and they will update me with the picture sent through [employer].  Blood pressures are a bit elevated at home.  They check 3 times weekly and I have asked them to update me.  Blood sugars are 87% in range with some continued lows at night.  Elected to decrease the Lantus from 15 units twice daily to 24 units once daily and then will follow-up in a couple months.  Has some persistent low back pain, without recent changes.      Review of Systems  Constitutional, HEENT, cardiovascular, pulmonary, gi and gu systems are negative, except as otherwise noted.      Objective    BP (!) 144/70 (BP Location: Left arm, Patient Position: Sitting, Cuff Size: Adult Regular)   Pulse 78   Temp 97.3  F (36.3  C) (Temporal)   Resp 16   Ht 1.549 m (5' 1\")   Wt 93.7 kg (206 lb 8 oz)   LMP 01/06/2015 (Exact Date)   SpO2 97%   BMI 39.02 kg/m    Body mass index is 39.02 kg/m .  Physical Exam   GENERAL: " alert and no distress  EYES: Eyes grossly normal to inspection, PERRL and conjunctivae and sclerae normal  NECK: no adenopathy, no asymmetry, masses, or scars  RESP: lungs clear to auscultation - no rales, rhonchi or wheezes  CV: regular rate and rhythm, normal S1 S2, no S3 or S4, no murmur, click or rub, no peripheral edema   MS: no gross musculoskeletal defects noted, no edema  SKIN: no suspicious lesions or rashes  NEURO: Normal strength and tone, mentation intact and speech normal  PSYCH: mentation appears normal, affect normal/bright    Office Visit on 07/18/2024   Component Date Value Ref Range Status    Sodium 07/18/2024 138  135 - 145 mmol/L Final    Potassium 07/18/2024 4.7  3.4 - 5.3 mmol/L Final    Chloride 07/18/2024 107  98 - 107 mmol/L Final    Carbon Dioxide (CO2) 07/18/2024 23  22 - 29 mmol/L Final    Anion Gap 07/18/2024 8  7 - 15 mmol/L Final    Urea Nitrogen 07/18/2024 10.7  8.0 - 23.0 mg/dL Final    Creatinine 07/18/2024 0.85  0.51 - 0.95 mg/dL Final    GFR Estimate 07/18/2024 77  >60 mL/min/1.73m2 Final    eGFR calculated using 2021 CKD-EPI equation.    Calcium 07/18/2024 9.0  8.8 - 10.4 mg/dL Final    Reference intervals for this test were updated on 7/16/2024 to reflect our healthy population more accurately. There may be differences in the flagging of prior results with similar values performed with this method. Those prior results can be interpreted in the context of the updated reference intervals.    Glucose 07/18/2024 97  70 - 99 mg/dL Final    WBC Count 07/18/2024 5.0  4.0 - 11.0 10e3/uL Final    RBC Count 07/18/2024 3.20 (L)  3.80 - 5.20 10e6/uL Final    Hemoglobin 07/18/2024 9.9 (L)  11.7 - 15.7 g/dL Final    Hematocrit 07/18/2024 31.0 (L)  35.0 - 47.0 % Final    MCV 07/18/2024 97  78 - 100 fL Final    MCH 07/18/2024 30.9  26.5 - 33.0 pg Final    MCHC 07/18/2024 31.9  31.5 - 36.5 g/dL Final    RDW 07/18/2024 13.4  10.0 - 15.0 % Final    Platelet Count 07/18/2024 195  150 - 450 10e3/uL  Final    % Neutrophils 07/18/2024 52  % Final    % Lymphocytes 07/18/2024 33  % Final    % Monocytes 07/18/2024 12  % Final    % Eosinophils 07/18/2024 3  % Final    % Basophils 07/18/2024 0  % Final    % Immature Granulocytes 07/18/2024 0  % Final    Absolute Neutrophils 07/18/2024 2.6  1.6 - 8.3 10e3/uL Final    Absolute Lymphocytes 07/18/2024 1.6  0.8 - 5.3 10e3/uL Final    Absolute Monocytes 07/18/2024 0.6  0.0 - 1.3 10e3/uL Final    Absolute Eosinophils 07/18/2024 0.2  0.0 - 0.7 10e3/uL Final    Absolute Basophils 07/18/2024 0.0  0.0 - 0.2 10e3/uL Final    Absolute Immature Granulocytes 07/18/2024 0.0  <=0.4 10e3/uL Final           Signed Electronically by: Albino Rainey MD

## 2024-09-03 NOTE — PROGRESS NOTES
Medication Therapy Management (MTM) Encounter    ASSESSMENT:                            Medication Adherence/Access: No issues identified    Diabetes:   A1c at goal <8%.   Continue to reduce insulin dose due to hypoglycemia.  Will switch to once daily dosing now that insulin dose is much smaller.  OK to continue at Mounjaro 7.5mg due to side effects. Will hold off on dose increase.   Encouraged reducing carbohydrate when eating fast food.    Hypertension:  Blood pressure not at goal <140/90.  Group home staff will send blood pressure readings. If remains elevated, consider restarting hydrochlorothiazide.     Osteoporosis:  Due for repeat DEXA. Not taking vitamin D, consider rechecking levels.    PLAN:                            Decrease Lantus to 24 units once daily    Continue Mounjaro at 7.5mg every 7 days    DEXA- ordered by PCP    Vitamin D level recheck - ordered by PCP    Follow-up: 2 months    SUBJECTIVE/OBJECTIVE:                          Nena Tang is a 64 year old female seen for a follow-up visit. Today's visit is a co-visit with Albino Rainey MD. Patient was accompanied by group home staff.      Reason for visit: medication recheck.    Allergies/ADRs: Reviewed in chart  Past Medical History: Reviewed in chart  Tobacco: She reports that she has never smoked. She has never used smokeless tobacco.  Alcohol: not currently using    Medication Adherence/Access: no issues reported  Patient has assistance with medication administration: group home.    Diabetes   Lantus 15 units twice daily  Humalog sliding scale 3 units per 50mg/dL starting glucose 151  Mounjaro 7.5mg weekly (hasn't started 10mg yet, dose increased by diabetes ed)  Side effects: Diarrhea x3 days after she takes Mounjaro (3x BM, loose not watery. She is unable to make it to the bathroom, somewhat bothersome to her.  Taking loperamide to manage which is somewhat helpful.  Worries diarrhea will worsen if increases the dose and would like  "to hold off)    Blood sugar monitoring: Continuous Glucose Monitor -       Discussed overnight low glucose again at today's visit. She has been more successful at treatment of overnight hypoglycemia with drinking juice and letting staff help her.  Current diabetes symptoms: shaking with low blood sugar.  Diet/Exercise: lower appetite with Mounjaro.  Happy with weight loss.  High post prandial glucose readings are correlated with times she eats fast food but is trying to make healthier choices.  Ex. Burger and fries, no ice cream, diet soda.       Eye exam is up to date  Foot exam is up to date    Hypertension   Amlodipine 10mg daily  Metoprolol XL 50mg AM and 100mg PM  Losartan 100mg daily  Patient reports no current medication side effects.   Patient has blood pressure monitored by group home.  Home BP monitoring :  140-145 systolic, 160s systolic this AM.         Osteoporosis:   calcium 600mg daily  alendronate (Fosamax) 70mg weekly (has been on current therapy 2 years (started 7/2022)  Patient is not experiencing side effects.  DEXA History: last 7/2022      Today's Vitals: LMP 01/06/2015 (Exact Date)   BP Readings from Last 1 Encounters:   09/03/24 (!) 149/85     Pulse Readings from Last 1 Encounters:   09/03/24 78     Wt Readings from Last 1 Encounters:   09/03/24 206 lb 8 oz (93.7 kg)     Ht Readings from Last 1 Encounters:   09/03/24 5' 1\" (1.549 m)     Estimated body mass index is 39.02 kg/m  as calculated from the following:    Height as of an earlier encounter on 9/3/24: 5' 1\" (1.549 m).    Weight as of an earlier encounter on 9/3/24: 206 lb 8 oz (93.7 kg).    Temp Readings from Last 1 Encounters:   09/03/24 97.3  F (36.3  C) (Temporal)      ----------------      I spent 30 minutes with this patient today. All changes were made via collaborative practice agreement with Albino Rainey MD. A copy of the visit note was provided to the patient's provider(s).    A summary of these recommendations was " given to the patient (see AVS from today's appointment with PCP).    Eugenia De Jesus, PharmD, BCACP   Medication Therapy Management Pharmacist   [facility]  [phone]          Medication Therapy Recommendations  Type 2 diabetes mellitus with mild nonproliferative retinopathy (H)    Current Medication: MOUNJARO 7.5 MG/0.5ML pen (Discontinued)   Rationale: Undesirable effect - Adverse medication event - Safety   Recommendation: Decrease Dose   Status: Accepted per Provider          Current Medication: insulin glargine (LANTUS SOLOSTAR) 100 UNIT/ML pen   Rationale: Dose too high - Dosage too high - Safety   Recommendation: Decrease Dose   Status: Accepted per Provider

## 2024-09-10 DIAGNOSIS — I25.119 CORONARY ARTERY DISEASE INVOLVING NATIVE HEART WITH ANGINA PECTORIS, UNSPECIFIED VESSEL OR LESION TYPE (H): ICD-10-CM

## 2024-09-10 DIAGNOSIS — K59.00 CONSTIPATION, UNSPECIFIED CONSTIPATION TYPE: ICD-10-CM

## 2024-09-10 DIAGNOSIS — R30.0 DYSURIA: ICD-10-CM

## 2024-09-10 RX ORDER — ASCORBIC ACID 500 MG
TABLET ORAL
Qty: 112 TABLET | Refills: 1 | Status: SHIPPED | OUTPATIENT
Start: 2024-09-10

## 2024-09-10 RX ORDER — MEDICAL SUPPLY, MISCELLANEOUS
EACH MISCELLANEOUS
Qty: 90 TABLET | Refills: 0 | Status: SHIPPED | OUTPATIENT
Start: 2024-09-10

## 2024-09-10 RX ORDER — LOSARTAN POTASSIUM 100 MG/1
TABLET ORAL
Qty: 28 TABLET | Refills: 11 | Status: SHIPPED | OUTPATIENT
Start: 2024-09-10

## 2024-09-26 DIAGNOSIS — H40.003 GLAUCOMA SUSPECT OF BOTH EYES: Primary | ICD-10-CM

## 2024-09-26 DIAGNOSIS — E08.3299: ICD-10-CM

## 2024-10-01 ENCOUNTER — MYC MEDICAL ADVICE (OUTPATIENT)
Dept: PHARMACY | Facility: CLINIC | Age: 64
End: 2024-10-01
Payer: COMMERCIAL

## 2024-10-01 DIAGNOSIS — Z79.4 TYPE 2 DIABETES MELLITUS WITH STAGE 3 CHRONIC KIDNEY DISEASE, WITH LONG-TERM CURRENT USE OF INSULIN, UNSPECIFIED WHETHER STAGE 3A OR 3B CKD (H): ICD-10-CM

## 2024-10-01 DIAGNOSIS — E11.22 TYPE 2 DIABETES MELLITUS WITH STAGE 3 CHRONIC KIDNEY DISEASE, WITH LONG-TERM CURRENT USE OF INSULIN, UNSPECIFIED WHETHER STAGE 3A OR 3B CKD (H): ICD-10-CM

## 2024-10-01 DIAGNOSIS — N18.30 TYPE 2 DIABETES MELLITUS WITH STAGE 3 CHRONIC KIDNEY DISEASE, WITH LONG-TERM CURRENT USE OF INSULIN, UNSPECIFIED WHETHER STAGE 3A OR 3B CKD (H): ICD-10-CM

## 2024-10-01 DIAGNOSIS — F25.1 SCHIZOAFFECTIVE DISORDER, DEPRESSIVE TYPE (H): ICD-10-CM

## 2024-10-01 DIAGNOSIS — F51.04 PSYCHOPHYSIOLOGICAL INSOMNIA: ICD-10-CM

## 2024-10-02 RX ORDER — INSULIN GLARGINE 100 [IU]/ML
24 INJECTION, SOLUTION SUBCUTANEOUS DAILY
Qty: 45 ML | Refills: 1 | Status: SHIPPED | OUTPATIENT
Start: 2024-10-02 | End: 2024-10-29

## 2024-10-02 RX ORDER — CLONAZEPAM 0.5 MG/1
0.5 TABLET ORAL AT BEDTIME
Qty: 28 TABLET | Refills: 5 | Status: SHIPPED | OUTPATIENT
Start: 2024-10-02

## 2024-10-07 ENCOUNTER — OFFICE VISIT (OUTPATIENT)
Dept: UROLOGY | Facility: CLINIC | Age: 64
End: 2024-10-07
Payer: COMMERCIAL

## 2024-10-07 VITALS
DIASTOLIC BLOOD PRESSURE: 78 MMHG | OXYGEN SATURATION: 93 % | HEART RATE: 80 BPM | BODY MASS INDEX: 38.71 KG/M2 | HEIGHT: 61 IN | WEIGHT: 205 LBS | SYSTOLIC BLOOD PRESSURE: 127 MMHG

## 2024-10-07 DIAGNOSIS — R30.0 DYSURIA: Primary | ICD-10-CM

## 2024-10-07 DIAGNOSIS — R35.0 URINARY FREQUENCY: ICD-10-CM

## 2024-10-07 PROCEDURE — 99214 OFFICE O/P EST MOD 30 MIN: CPT | Performed by: PHYSICIAN ASSISTANT

## 2024-10-07 RX ORDER — MULTIVIT WITH MINERALS/LUTEIN
1000 TABLET ORAL 2 TIMES DAILY
Qty: 180 TABLET | Refills: 1 | Status: SHIPPED | OUTPATIENT
Start: 2024-10-07

## 2024-10-07 RX ORDER — MIRABEGRON 50 MG/1
50 TABLET, FILM COATED, EXTENDED RELEASE ORAL DAILY
Qty: 90 TABLET | Refills: 5 | Status: SHIPPED | OUTPATIENT
Start: 2024-10-07

## 2024-10-07 RX ORDER — ESTRADIOL 0.1 MG/G
CREAM VAGINAL
Qty: 42.5 G | Refills: 3 | Status: CANCELLED | OUTPATIENT
Start: 2024-10-07

## 2024-10-07 NOTE — LETTER
"10/7/2024       RE: Nena Tang  [address] [address]     Dear Colleague,    Thank you for referring your patient, Nena Tang, to the [facility]. Please see a copy of my visit note below.      CC: dysuria, leakage    HPI:  Nena \"Ania\" Kitty is a pleasant 64 year old female who presents in follow-up of the above.     At time of initial consult, had dysuria, which ws intermittent and ongoing for several months. Last UAs neg (2021) Wears depends. Has had leakage for several years as well as urgency and nocturia. No gross hematuria.     On Myrbetriq 25 mg daily. Declined topical estrogen last time. Wears less Depends. Has helped some, but getting more freq. Last 3 UCs with mixed.     TODAY 10/7/24  PVR 2cc  Hgb A1C 5.3 (improved).   Here with staff.  Things reportedly going well with Myrbetriq and Vit C.   Not using topical estrogen.     Past Medical History:   Diagnosis Date     Acute respiratory failure with hypoxia (H) 09/04/2017     CAD (coronary artery disease)     5/2014 cath, nonbostructive stenosis to LAD, RCA.     Chronic low back pain 01/22/2013     Cocaine abuse, in remission (H)      Fecal urgency 03/08/2012     History of heroin abuse (H)      Hyperlipidemia LDL goal <100 10/31/2010     Hypertension 07/29/2013     Illiterate 08/30/2011     Irritable bowel syndrome      Left cataract      Migraine 04/19/2012     Migraine headache 04/22/2013     Moderate major depression (H) 06/08/2011     Noncompliance with medication regimen 06/08/2011     Obesity      CINDY (obstructive sleep apnea) 03/08/2012    uses CPAP     CINDY (obstructive sleep apnea)- mild AHI 10.3      Osteopenia 10/07/2009     Pneumonia      Pneumonia of right lower lobe due to infectious organism 09/04/2017     Schizoaffective disorder, depressive type (H) 02/25/2013     Sepsis (H) 08/29/2017     Suicidal intent 10/02/2013     " Takotsubo cardiomyopathy      Type 2 diabetes mellitus (H) 08/30/2011     Uterine cancer (H) 1983     Verbal auditory hallucination 10/04/2012       Past Surgical History:   Procedure Laterality Date     CATARACT IOL, RT/LT Bilateral 2017     CHOLECYSTECTOMY       COLONOSCOPY N/A 3/16/2017    Procedure: COLONOSCOPY;  Surgeon: Traci Gonzalez MD;  Location:  GI     Coronary CTA  5/21/2014     HYSTERECTOMY  1983    uterine cancer yearly pap's per provider.     HYSTERECTOMY       LAPAROSCOPIC CHOLECYSTECTOMY  2008     PHACOEMULSIFICATION CLEAR CORNEA WITH STANDARD INTRAOCULAR LENS IMPLANT Left 5/5/2017    Procedure: PHACOEMULSIFICATION CLEAR CORNEA WITH STANDARD INTRAOCULAR LENS IMPLANT;  LEFT EYE PHACOEMULSIFICATION CLEAR CORNEA WITH STANDARD INTRAOCULAR LENS IMPLANT ;  Surgeon: Tyra Diaz MD;  Location:  [facility]     PHACOEMULSIFICATION CLEAR CORNEA WITH STANDARD INTRAOCULAR LENS IMPLANT Right 6/30/2017    Procedure: PHACOEMULSIFICATION CLEAR CORNEA WITH STANDARD INTRAOCULAR LENS IMPLANT;  RIGHT EYE PHACOEMULSIFICATION CLEAR CORNEA WITH STANDARD INTRAOCULAR LENS IMPLANT;  Surgeon: Tyra Diaz MD;  Location:  [facility]     RELEASE TRIGGER FINGER  10/11/2012    Left thumb. Procedure: RELEASE TRIGGER FINGER;  LEFT THUMB TRIGGER RELEASE;  Surgeon: Tay Langley MD;  Location:  [facility]     RELEASE TRIGGER FINGER Right 12/26/2016    Procedure: RELEASE TRIGGER FINGER;  Surgeon: Albino Castañeda MD;  Location:  OR     [facility] OOPHORECTORMY FOR MALALEJO, W/BX  1983    UTERINE       Social History     Socioeconomic History     Marital status:      Spouse name: Not on file     Number of children: 2     Years of education: Not on file     Highest education level: Not on file   Occupational History     Not on file   Tobacco Use     Smoking status: Never     Smokeless tobacco: Never   Vaping Use     Vaping status: Never Used   Substance and Sexual Activity     Alcohol use: Not Currently     Comment: when  younger     Drug use: No     Comment: history of     Sexual activity: Not Currently     Partners: Male     Birth control/protection: None, Condom   Other Topics Concern     Parent/sibling w/ CABG, MI or angioplasty before 65F 55M? Not Asked      Service No     Blood Transfusions No     Caffeine Concern No     Occupational Exposure No     Hobby Hazards No     Sleep Concern Yes     Stress Concern Yes     Weight Concern Yes     Special Diet Yes     Comment: DM     Back Care Yes     Exercise Yes     Comment: WALKS DAILY     Bike Helmet Not Asked     Seat Belt Yes     Self-Exams No     Comment: ENCOURAGED   Social History Narrative     10/2014. Has 2 sons. 7 grandchildren.         Unemployed. Graduated HS.         Tobacco use: Denies    Alcohol use: Escalated use since   10/2014    Drug: Denies     Social Determinants of Health     Financial Resource Strain: Low Risk  (3/12/2024)    Financial Resource Strain      Within the past 12 months, have you or your family members you live with been unable to get utilities (heat, electricity) when it was really needed?: No   Food Insecurity: Low Risk  (3/12/2024)    Food Insecurity      Within the past 12 months, did you worry that your food would run out before you got money to buy more?: No      Within the past 12 months, did the food you bought just not last and you didn t have money to get more?: No   Transportation Needs: Low Risk  (3/12/2024)    Transportation Needs      Within the past 12 months, has lack of transportation kept you from medical appointments, getting your medicines, non-medical meetings or appointments, work, or from getting things that you need?: No   Physical Activity: Insufficiently Active (3/12/2024)    Exercise Vital Sign      Days of Exercise per Week: 2 days      Minutes of Exercise per Session: 30 min   Stress: No Stress Concern Present (3/12/2024)    [employer] [place] [employer] - Occupational Stress  Questionnaire      Feeling of Stress : Only a little   Social Connections: Unknown (3/12/2024)    Social Connection and Isolation Panel [NHANES]      Frequency of Communication with Friends and Family: Not on file      Frequency of Social Gatherings with Friends and Family: Three times a week      Attends Sikh Services: Not on file      Active Member of Clubs or Organizations: Not on file      Attends Club or Organization Meetings: Not on file      Marital Status: Not on file   Interpersonal Safety: Not At Risk (2022)    Humiliation, Afraid, Rape, and Kick questionnaire      Fear of Current or Ex-Partner: No      Emotionally Abused: No      Physically Abused: No      Sexually Abused: No   Housing Stability: Low Risk  (3/12/2024)    Housing Stability      Do you have housing? : Yes      Are you worried about losing your housing?: No       Family History   Problem Relation Age of Onset     Cancer Mother         BLADDER     Respiratory Mother         COPD     Gastrointestinal Disease Mother         CIRRHOSIS OF LI BOLIVAR     Alcohol/Drug Mother      Diabetes Mother      Hypertension Mother      Lipids Mother      C.A.D. Mother      Glaucoma Mother      Alcohol/Drug Sister      Mental Illness Sister      Alcohol/Drug Sister      Psychotic Disorder Sister      Cancer Maternal Grandmother         UNKNOWN TYPE     Cancer Brother         COLON     Cancer - colorectal Brother         IN HIS LATE 30S     Alcohol/Drug Brother          OF HEROIN OVERDOSE AT AGE 22 YRS     Macular Degeneration No family hx of        Allergies   Allergen Reactions     Imidazole Antifungals Hives     Tolerates diflucan     Ketoprofen Itching     Pruritis to topical     Lisinopril Hives     Metformin Other (See Comments)     Patient hospitalized for lactic acidosis - admitting provider suspectd caused by metformin     Metronidazole Hives     Penicillins      Posaconazole Hives     Tolerates diflucan       Current Outpatient Medications    Medication Sig Dispense Refill     acetaminophen (TYLENOL) 500 MG tablet TAKE 2 TABLETS BY MOUTH THREE TIMES A  tablet 4     albuterol (PROAIR HFA/PROVENTIL HFA/VENTOLIN HFA) 108 (90 Base) MCG/ACT inhaler Inhale 2 puffs into the lungs every 6 hours as needed for shortness of breath, wheezing or cough 18 g 3     Alcohol Swabs (ALCOHOL PREP) 70 % PADS APPLY 1 UNITS TOPICALLY 8 TIMES DAILY 200 each 3     alendronate (FOSAMAX) 70 MG tablet Take 1 tablet (70 mg) by mouth every 7 days 4 tablet 11     amantadine (SYMMETREL) 100 MG capsule TAKE 1 CAPSULE BY MOUTH DAILY 30 capsule 11     amLODIPine (NORVASC) 10 MG tablet Take 1 tablet (10 mg) by mouth every morning 30 tablet 11     ASPIRIN LOW DOSE 81 MG EC tablet TAKE 1 TABLET BY MOUTH DAILY 28 tablet 11     atorvastatin (LIPITOR) 80 MG tablet TAKE 1 TABLET BY MOUTH DAILY 90 tablet 2     blood glucose (NO BRAND SPECIFIED) test strip Use to test blood sugar 10 times daily or as directed. 100 strip 11     calcium carbonate (OS-ALLEN) 1500 (600 Ca) MG tablet TAKE 1 TABLET BY MOUTH DAILY 90 tablet 2     chlorhexidine (PERIDEX) 0.12 % solution Swish and spit 10 mLs in mouth 2 times daily 1893 mL 3     clonazePAM (KLONOPIN) 0.5 MG tablet Take 1 tablet (0.5 mg) by mouth at bedtime. 28 tablet 5     Continuous Glucose Sensor (DEXCOM G6 SENSOR) MISC Change every 10 days. 9 each 4     Continuous Glucose Transmitter (DEXCOM G6 TRANSMITTER) MISC Change every 3 months. 1 each 4     diclofenac (VOLTAREN) 1 % topical gel Apply 4 grams to painful area at bedtime. May use an additional 3 doses during the day as needed 100 g 1     gabapentin (NEURONTIN) 300 MG capsule Take 1 capsule (300 mg) by mouth at bedtime 28 capsule 11     HUMALOG KWIKPEN 100 UNIT/ML soln Inject 0-12 Units subcutaneously 4 times daily (with meals and nightly) Before meals: BG 81 - 150:  0 units 151 - 200: 2 units, 201 - 250: 4 units, 251-300: 6 units, 301 - 350: 8 units, 351 or greater : 10 units At BEDTIME-If >4  hours since last Humalog injection For  - 249 give 1 units, 250 - 299 give 2 units,300 - 349 give 3 units, = or > 350 give 4 units. 15 mL 1     hydrOXYzine (VISTARIL) 25 MG capsule TAKE 1 CAPSULE BY MOUTH EVERY NIGHT AT BEDTIME ANXIETY/SLEEP 28 capsule 0     ibuprofen (ADVIL/MOTRIN) 200 MG tablet Take 200 mg by mouth every 4 hours as needed for pain       insulin glargine (LANTUS SOLOSTAR) 100 UNIT/ML pen Inject 24 Units subcutaneously daily. 45 mL 1     insulin pen needle (BD AUTOSHIELD DUO) 30G X 5 MM USE 6 DAILY OR AS DIRECTED 200 each 10     Lancets (ONETOUCH DELICA PLUS [id number]) MISC 1 EACH AS NEEDED  USE TO TEST BLOOD SUGARS 1-2 TIMES DAILY AS DIRECTED 100 each 1     leflunomide (ARAVA) 20 MG tablet Take 1 tablet (20 mg) by mouth daily Labs every 8-12 weeks 90 tablet 0     Lidocaine (LIDOCAINE PAIN RELIEF) 4 % Patch APPLY 1 PATCH ONTO SKIN EVERY 24 HOURS ; APPLY AT NIGHT AND REMOVE IN THE MORNING ( TWELVE HOURS PATCH FREE) 30 patch 11     loperamide (IMODIUM) 2 MG capsule TAKE 1 CAPSULE BY MOUTH FOUR TIMES A DAY AS NEEDED FOR DIARRHEA 30 capsule 1     losartan (COZAAR) 100 MG tablet TAKE 1 TABLET BY MOUTH DAILY 28 tablet 11     Magnesium Oxide 250 MG TABS TAKE 1 TABLET BY MOUTH AT BEDTIME 28 tablet 11     melatonin 5 MG tablet Take 5 mg by mouth at bedtime       metoprolol succinate ER (TOPROL XL) 50 MG 24 hr tablet TAKE 1 TABLET BY MOUTH IN THE MORNING AND TAKE 2 TABLETS AT BEDTIME 84 tablet 11     mirabegron (MYRBETRIQ) 50 MG 24 hr tablet TAKE 1 TABLET BY MOUTH DAILY 28 tablet 1     mupirocin (BACTROBAN) 2 % external ointment Apply topically 2 times daily 22 g 0     naproxen (NAPROSYN) 375 MG tablet Take 1 tablet (375 mg) by mouth 2 times daily as needed for moderate pain (denatl pain) 60 tablet 11     NYSTOP [id number] UNIT/GM powder APPLY TOPICALLY TWICE A DAY AS NEEDED 60 g 0     ondansetron (ZOFRAN) 4 MG tablet TAKE 1 TABLET BY MOUTH EVERY 8 HOURS AS NEEDED FOR NAUSEA 10 tablet 1     order for DME  "Equipment being ordered: Depends. 30 each 4     order for DME Equipment being ordered: CPAP Supplies. 1 each 0     paliperidone ER (INVEGA) 6 MG 24 hr tablet TAKE 2 TABLETS BY MOUTH EVERY NIGHT AT BEDTIME 60 tablet 11     pantoprazole (PROTONIX) 40 MG EC tablet TAKE 1 TABLET (40 MG) BY MOUTH DAILY 28 tablet 11     prazosin (MINIPRESS) 1 MG capsule Take 1 mg by mouth at bedtime       QUEtiapine (SEROQUEL) 100 MG tablet Take 100 mg by mouth at bedtime       senna-docusate (SENOKOT-S/PERICOLACE) 8.6-50 MG tablet Take 1 tablet by mouth daily       senna-docusate (STOOL SOFTENER PLUS LAXATIVE) 8.6-50 MG tablet TAKE 1 TABLET BY MOUTH DAILY AND EXTRA 1 TABLET AS NEEDED FOR CONSTIPATION 90 tablet 0     sertraline (ZOLOFT) 100 MG tablet Take 100 mg by mouth daily       tirzepatide (MOUNJARO) 7.5 MG/0.5ML pen Inject 7.5 mg subcutaneously once a week. Patient will be staying on 7.5 mg weekly dose, not increasing to 10 mg as may have been ordered. 2 mL 1     traZODone (DESYREL) 100 MG tablet TAKE 1 TABLET BY MOUTH AT BEDTIME 28 tablet 11     vitamin C (ASCORBIC ACID) 500 MG tablet TAKE 2 TABLETS BY MOUTH IN THE MORNING AND TAKE 2 TABLETS BY MOUTH IN THE EVENING 112 tablet 1     zinc oxide (DESITIN) 20 % external ointment Apply topically 3 times daily as needed for irritation (barrier cream) 60 g 3     Current Facility-Administered Medications   Medication Dose Route Frequency Provider Last Rate Last Admin     apraclonidine (IOPIDINE) 1 % ophthalmic solution 1 drop  1 drop Both Eyes Once Tiburcio Chacon MD         lidocaine 1 % injection 4 mL  4 mL   Luiz Hernandez DO   4 mL at 06/22/23 1400     triamcinolone (KENALOG-40) injection 40 mg  40 mg   Luiz Hernandez DO   40 mg at 06/22/23 1400         PEx:   /78   Pulse 80   Ht 1.549 m (5' 1\")   Wt 93 kg (205 lb)   LMP 01/06/2015 (Exact Date)   SpO2 93%   BMI 38.73 kg/m      PSYCH: NAD    A/P: Nena Tang is a 64 year old female with urge " incontinence  -UA/UC is symptoms  -Vit C refilled.   -Myrbetriq 50 mg daily; refilled.   -follow-up PRN    Gisselle Nash PA-C  [facility]      26 minutes spent on the date of the encounter doing chart review, review of outside records, review of test results, interpretation of tests, patient visit and documentation                   Again, thank you for allowing me to participate in the care of your patient.      Sincerely,    Gisselle Nash PA-C, [place]

## 2024-10-07 NOTE — PROGRESS NOTES
"  CC: dysuria, leakage    HPI:  Nena \"Ania\" Kitty is a pleasant 64 year old female who presents in follow-up of the above.     At time of initial consult, had dysuria, which ws intermittent and ongoing for several months. Last UAs neg (2021) Wears depends. Has had leakage for several years as well as urgency and nocturia. No gross hematuria.     On Myrbetriq 25 mg daily. Declined topical estrogen last time. Wears less Depends. Has helped some, but getting more freq. Last 3 UCs with mixed.     TODAY 10/7/24  PVR 2cc  Hgb A1C 5.3 (improved).   Here with staff.  Things reportedly going well with Myrbetriq and Vit C.   Not using topical estrogen.     Past Medical History:   Diagnosis Date    Acute respiratory failure with hypoxia (H) 09/04/2017    CAD (coronary artery disease)     5/2014 cath, nonbostructive stenosis to LAD, RCA.    Chronic low back pain 01/22/2013    Cocaine abuse, in remission (H)     Fecal urgency 03/08/2012    History of heroin abuse (H)     Hyperlipidemia LDL goal <100 10/31/2010    Hypertension 07/29/2013    Illiterate 08/30/2011    Irritable bowel syndrome     Left cataract     Migraine 04/19/2012    Migraine headache 04/22/2013    Moderate major depression (H) 06/08/2011    Noncompliance with medication regimen 06/08/2011    Obesity     CINDY (obstructive sleep apnea) 03/08/2012    uses CPAP    CINDY (obstructive sleep apnea)- mild AHI 10.3     Osteopenia 10/07/2009    Pneumonia     Pneumonia of right lower lobe due to infectious organism 09/04/2017    Schizoaffective disorder, depressive type (H) 02/25/2013    Sepsis (H) 08/29/2017    Suicidal intent 10/02/2013    Takotsubo cardiomyopathy     Type 2 diabetes mellitus (H) 08/30/2011    Uterine cancer (H) 1983    Verbal auditory hallucination 10/04/2012       Past Surgical History:   Procedure Laterality Date    CATARACT IOL, RT/LT Bilateral 2017    CHOLECYSTECTOMY      COLONOSCOPY N/A 3/16/2017    Procedure: COLONOSCOPY;  Surgeon: Carlos, " Traci Garcia MD;  Location:  GI    Coronary CTA  5/21/2014    HYSTERECTOMY  1983    uterine cancer yearly pap's per provider.    HYSTERECTOMY      LAPAROSCOPIC CHOLECYSTECTOMY  2008    PHACOEMULSIFICATION CLEAR CORNEA WITH STANDARD INTRAOCULAR LENS IMPLANT Left 5/5/2017    Procedure: PHACOEMULSIFICATION CLEAR CORNEA WITH STANDARD INTRAOCULAR LENS IMPLANT;  LEFT EYE PHACOEMULSIFICATION CLEAR CORNEA WITH STANDARD INTRAOCULAR LENS IMPLANT ;  Surgeon: Tyra Diaz MD;  Location:  [facility]    PHACOEMULSIFICATION CLEAR CORNEA WITH STANDARD INTRAOCULAR LENS IMPLANT Right 6/30/2017    Procedure: PHACOEMULSIFICATION CLEAR CORNEA WITH STANDARD INTRAOCULAR LENS IMPLANT;  RIGHT EYE PHACOEMULSIFICATION CLEAR CORNEA WITH STANDARD INTRAOCULAR LENS IMPLANT;  Surgeon: Tyra Diaz MD;  Location:  [facility]    RELEASE TRIGGER FINGER  10/11/2012    Left thumb. Procedure: RELEASE TRIGGER FINGER;  LEFT THUMB TRIGGER RELEASE;  Surgeon: Tay Langley MD;  Location:  [facility]    RELEASE TRIGGER FINGER Right 12/26/2016    Procedure: RELEASE TRIGGER FINGER;  Surgeon: Albino Castañeda MD;  Location:  OR    [facility] OOPHORECTORMY FOR MALIG, W/BX  1983    UTERINE       Social History     Socioeconomic History    Marital status:      Spouse name: Not on file    Number of children: 2    Years of education: Not on file    Highest education level: Not on file   Occupational History    Not on file   Tobacco Use    Smoking status: Never    Smokeless tobacco: Never   Vaping Use    Vaping status: Never Used   Substance and Sexual Activity    Alcohol use: Not Currently     Comment: when younger    Drug use: No     Comment: history of    Sexual activity: Not Currently     Partners: Male     Birth control/protection: None, Condom   Other Topics Concern    Parent/sibling w/ CABG, MI or angioplasty before 65F 55M? Not Asked     Service No    Blood Transfusions No    Caffeine Concern No    Occupational Exposure No    Hobby Hazards  No    Sleep Concern Yes    Stress Concern Yes    Weight Concern Yes    Special Diet Yes     Comment: DM    Back Care Yes    Exercise Yes     Comment: WALKS DAILY    Bike Helmet Not Asked    Seat Belt Yes    Self-Exams No     Comment: ENCOURAGED   Social History Narrative     10/2014. Has 2 sons. 7 grandchildren.         Unemployed. Graduated HS.         Tobacco use: Denies    Alcohol use: Escalated use since   10/2014    Drug: Denies     Social Determinants of Health     Financial Resource Strain: Low Risk  (3/12/2024)    Financial Resource Strain     Within the past 12 months, have you or your family members you live with been unable to get utilities (heat, electricity) when it was really needed?: No   Food Insecurity: Low Risk  (3/12/2024)    Food Insecurity     Within the past 12 months, did you worry that your food would run out before you got money to buy more?: No     Within the past 12 months, did the food you bought just not last and you didn t have money to get more?: No   Transportation Needs: Low Risk  (3/12/2024)    Transportation Needs     Within the past 12 months, has lack of transportation kept you from medical appointments, getting your medicines, non-medical meetings or appointments, work, or from getting things that you need?: No   Physical Activity: Insufficiently Active (3/12/2024)    Exercise Vital Sign     Days of Exercise per Week: 2 days     Minutes of Exercise per Session: 30 min   Stress: No Stress Concern Present (3/12/2024)    [employer] [place] [employer] - Occupational Stress Questionnaire     Feeling of Stress : Only a little   Social Connections: Unknown (3/12/2024)    Social Connection and Isolation Panel [NHANES]     Frequency of Communication with Friends and Family: Not on file     Frequency of Social Gatherings with Friends and Family: Three times a week     Attends Taoism Services: Not on file     Active Member of Clubs or Organizations: Not on  file     Attends Club or Organization Meetings: Not on file     Marital Status: Not on file   Interpersonal Safety: Not At Risk (2022)    Humiliation, Afraid, Rape, and Kick questionnaire     Fear of Current or Ex-Partner: No     Emotionally Abused: No     Physically Abused: No     Sexually Abused: No   Housing Stability: Low Risk  (3/12/2024)    Housing Stability     Do you have housing? : Yes     Are you worried about losing your housing?: No       Family History   Problem Relation Age of Onset    Cancer Mother         BLADDER    Respiratory Mother         COPD    Gastrointestinal Disease Mother         CIRRHOSIS OF LI BOLIVAR    Alcohol/Drug Mother     Diabetes Mother     Hypertension Mother     Lipids Mother     C.A.D. Mother     Glaucoma Mother     Alcohol/Drug Sister     Mental Illness Sister     Alcohol/Drug Sister     Psychotic Disorder Sister     Cancer Maternal Grandmother         UNKNOWN TYPE    Cancer Brother         COLON    Cancer - colorectal Brother         IN HIS LATE 30S    Alcohol/Drug Brother          OF HEROIN OVERDOSE AT AGE 22 YRS    Macular Degeneration No family hx of        Allergies   Allergen Reactions    Imidazole Antifungals Hives     Tolerates diflucan    Ketoprofen Itching     Pruritis to topical    Lisinopril Hives    Metformin Other (See Comments)     Patient hospitalized for lactic acidosis - admitting provider suspectd caused by metformin    Metronidazole Hives    Penicillins     Posaconazole Hives     Tolerates diflucan       Current Outpatient Medications   Medication Sig Dispense Refill    acetaminophen (TYLENOL) 500 MG tablet TAKE 2 TABLETS BY MOUTH THREE TIMES A  tablet 4    albuterol (PROAIR HFA/PROVENTIL HFA/VENTOLIN HFA) 108 (90 Base) MCG/ACT inhaler Inhale 2 puffs into the lungs every 6 hours as needed for shortness of breath, wheezing or cough 18 g 3    Alcohol Swabs (ALCOHOL PREP) 70 % PADS APPLY 1 UNITS TOPICALLY 8 TIMES DAILY 200 each 3    alendronate  (FOSAMAX) 70 MG tablet Take 1 tablet (70 mg) by mouth every 7 days 4 tablet 11    amantadine (SYMMETREL) 100 MG capsule TAKE 1 CAPSULE BY MOUTH DAILY 30 capsule 11    amLODIPine (NORVASC) 10 MG tablet Take 1 tablet (10 mg) by mouth every morning 30 tablet 11    ASPIRIN LOW DOSE 81 MG EC tablet TAKE 1 TABLET BY MOUTH DAILY 28 tablet 11    atorvastatin (LIPITOR) 80 MG tablet TAKE 1 TABLET BY MOUTH DAILY 90 tablet 2    blood glucose (NO BRAND SPECIFIED) test strip Use to test blood sugar 10 times daily or as directed. 100 strip 11    calcium carbonate (OS-ALLEN) 1500 (600 Ca) MG tablet TAKE 1 TABLET BY MOUTH DAILY 90 tablet 2    chlorhexidine (PERIDEX) 0.12 % solution Swish and spit 10 mLs in mouth 2 times daily 1893 mL 3    clonazePAM (KLONOPIN) 0.5 MG tablet Take 1 tablet (0.5 mg) by mouth at bedtime. 28 tablet 5    Continuous Glucose Sensor (DEXCOM G6 SENSOR) MISC Change every 10 days. 9 each 4    Continuous Glucose Transmitter (DEXCOM G6 TRANSMITTER) MISC Change every 3 months. 1 each 4    diclofenac (VOLTAREN) 1 % topical gel Apply 4 grams to painful area at bedtime. May use an additional 3 doses during the day as needed 100 g 1    gabapentin (NEURONTIN) 300 MG capsule Take 1 capsule (300 mg) by mouth at bedtime 28 capsule 11    HUMALOG KWIKPEN 100 UNIT/ML soln Inject 0-12 Units subcutaneously 4 times daily (with meals and nightly) Before meals: BG 81 - 150:  0 units 151 - 200: 2 units, 201 - 250: 4 units, 251-300: 6 units, 301 - 350: 8 units, 351 or greater : 10 units At BEDTIME-If >4 hours since last Humalog injection For  - 249 give 1 units, 250 - 299 give 2 units,300 - 349 give 3 units, = or > 350 give 4 units. 15 mL 1    hydrOXYzine (VISTARIL) 25 MG capsule TAKE 1 CAPSULE BY MOUTH EVERY NIGHT AT BEDTIME ANXIETY/SLEEP 28 capsule 0    ibuprofen (ADVIL/MOTRIN) 200 MG tablet Take 200 mg by mouth every 4 hours as needed for pain      insulin glargine (LANTUS SOLOSTAR) 100 UNIT/ML pen Inject 24 Units  subcutaneously daily. 45 mL 1    insulin pen needle (BD AUTOSHIELD DUO) 30G X 5 MM USE 6 DAILY OR AS DIRECTED 200 each 10    Lancets (ONETOUCH DELICA PLUS QUMEBT36E) MISC 1 EACH AS NEEDED  USE TO TEST BLOOD SUGARS 1-2 TIMES DAILY AS DIRECTED 100 each 1    leflunomide (ARAVA) 20 MG tablet Take 1 tablet (20 mg) by mouth daily Labs every 8-12 weeks 90 tablet 0    Lidocaine (LIDOCAINE PAIN RELIEF) 4 % Patch APPLY 1 PATCH ONTO SKIN EVERY 24 HOURS ; APPLY AT NIGHT AND REMOVE IN THE MORNING ( TWELVE HOURS PATCH FREE) 30 patch 11    loperamide (IMODIUM) 2 MG capsule TAKE 1 CAPSULE BY MOUTH FOUR TIMES A DAY AS NEEDED FOR DIARRHEA 30 capsule 1    losartan (COZAAR) 100 MG tablet TAKE 1 TABLET BY MOUTH DAILY 28 tablet 11    Magnesium Oxide 250 MG TABS TAKE 1 TABLET BY MOUTH AT BEDTIME 28 tablet 11    melatonin 5 MG tablet Take 5 mg by mouth at bedtime      metoprolol succinate ER (TOPROL XL) 50 MG 24 hr tablet TAKE 1 TABLET BY MOUTH IN THE MORNING AND TAKE 2 TABLETS AT BEDTIME 84 tablet 11    mirabegron (MYRBETRIQ) 50 MG 24 hr tablet TAKE 1 TABLET BY MOUTH DAILY 28 tablet 1    mupirocin (BACTROBAN) 2 % external ointment Apply topically 2 times daily 22 g 0    naproxen (NAPROSYN) 375 MG tablet Take 1 tablet (375 mg) by mouth 2 times daily as needed for moderate pain (denatl pain) 60 tablet 11    NYSTOP 259161 UNIT/GM powder APPLY TOPICALLY TWICE A DAY AS NEEDED 60 g 0    ondansetron (ZOFRAN) 4 MG tablet TAKE 1 TABLET BY MOUTH EVERY 8 HOURS AS NEEDED FOR NAUSEA 10 tablet 1    order for DME Equipment being ordered: Depends. 30 each 4    order for DME Equipment being ordered: CPAP Supplies. 1 each 0    paliperidone ER (INVEGA) 6 MG 24 hr tablet TAKE 2 TABLETS BY MOUTH EVERY NIGHT AT BEDTIME 60 tablet 11    pantoprazole (PROTONIX) 40 MG EC tablet TAKE 1 TABLET (40 MG) BY MOUTH DAILY 28 tablet 11    prazosin (MINIPRESS) 1 MG capsule Take 1 mg by mouth at bedtime      QUEtiapine (SEROQUEL) 100 MG tablet Take 100 mg by mouth at bedtime  "     senna-docusate (SENOKOT-S/PERICOLACE) 8.6-50 MG tablet Take 1 tablet by mouth daily      senna-docusate (STOOL SOFTENER PLUS LAXATIVE) 8.6-50 MG tablet TAKE 1 TABLET BY MOUTH DAILY AND EXTRA 1 TABLET AS NEEDED FOR CONSTIPATION 90 tablet 0    sertraline (ZOLOFT) 100 MG tablet Take 100 mg by mouth daily      tirzepatide (MOUNJARO) 7.5 MG/0.5ML pen Inject 7.5 mg subcutaneously once a week. Patient will be staying on 7.5 mg weekly dose, not increasing to 10 mg as may have been ordered. 2 mL 1    traZODone (DESYREL) 100 MG tablet TAKE 1 TABLET BY MOUTH AT BEDTIME 28 tablet 11    vitamin C (ASCORBIC ACID) 500 MG tablet TAKE 2 TABLETS BY MOUTH IN THE MORNING AND TAKE 2 TABLETS BY MOUTH IN THE EVENING 112 tablet 1    zinc oxide (DESITIN) 20 % external ointment Apply topically 3 times daily as needed for irritation (barrier cream) 60 g 3     Current Facility-Administered Medications   Medication Dose Route Frequency Provider Last Rate Last Admin    apraclonidine (IOPIDINE) 1 % ophthalmic solution 1 drop  1 drop Both Eyes Once Tiburcio Chacon MD        lidocaine 1 % injection 4 mL  4 mL   Luiz Hernandez DO   4 mL at 06/22/23 1400    triamcinolone (KENALOG-40) injection 40 mg  40 mg   Luiz Hernandez DO   40 mg at 06/22/23 1400         PEx:   /78   Pulse 80   Ht 1.549 m (5' 1\")   Wt 93 kg (205 lb)   LMP 01/06/2015 (Exact Date)   SpO2 93%   BMI 38.73 kg/m      PSYCH: NAD    A/P: Nena Tang is a 64 year old female with urge incontinence  -UA/UC is symptoms  -Vit C refilled.   -Myrbetriq 50 mg daily; refilled.   -follow-up DRAKEN    BRANDON Ocampo [facility]      26 minutes spent on the date of the encounter doing chart review, review of outside records, review of test results, interpretation of tests, patient visit and documentation                 "

## 2024-10-07 NOTE — NURSING NOTE
Chief Complaint   Patient presents with    Urinary frequency     Patient here to discuss her medication       Patient here with her support team  Patient voided in clinic  Patient had a bladder scan  PVR 2 ml done by bladder scan          ARRON Anne

## 2024-10-08 ENCOUNTER — MYC MEDICAL ADVICE (OUTPATIENT)
Dept: FAMILY MEDICINE | Facility: CLINIC | Age: 64
End: 2024-10-08
Payer: COMMERCIAL

## 2024-10-08 DIAGNOSIS — M81.0 OSTEOPOROSIS WITHOUT CURRENT PATHOLOGICAL FRACTURE, UNSPECIFIED OSTEOPOROSIS TYPE: ICD-10-CM

## 2024-10-08 DIAGNOSIS — T43.505A NEUROLEPTIC-INDUCED TARDIVE DYSKINESIA: ICD-10-CM

## 2024-10-08 DIAGNOSIS — G24.01 NEUROLEPTIC-INDUCED TARDIVE DYSKINESIA: ICD-10-CM

## 2024-10-08 DIAGNOSIS — Z79.4 TYPE 2 DIABETES MELLITUS WITH MILD NONPROLIFERATIVE RETINOPATHY WITHOUT MACULAR EDEMA, WITH LONG-TERM CURRENT USE OF INSULIN, UNSPECIFIED LATERALITY (H): ICD-10-CM

## 2024-10-08 DIAGNOSIS — E11.3299 TYPE 2 DIABETES MELLITUS WITH MILD NONPROLIFERATIVE RETINOPATHY WITHOUT MACULAR EDEMA, WITH LONG-TERM CURRENT USE OF INSULIN, UNSPECIFIED LATERALITY (H): ICD-10-CM

## 2024-10-08 RX ORDER — AMANTADINE HYDROCHLORIDE 100 MG/1
100 CAPSULE, GELATIN COATED ORAL DAILY
Qty: 28 CAPSULE | Refills: 11 | Status: SHIPPED | OUTPATIENT
Start: 2024-10-08

## 2024-10-08 RX ORDER — ALENDRONATE SODIUM 70 MG/1
70 TABLET ORAL
Qty: 4 TABLET | Refills: 11 | Status: SHIPPED | OUTPATIENT
Start: 2024-10-08

## 2024-10-10 DIAGNOSIS — R19.7 DIARRHEA, UNSPECIFIED TYPE: ICD-10-CM

## 2024-10-10 DIAGNOSIS — R07.81 RIB PAIN ON RIGHT SIDE: ICD-10-CM

## 2024-10-10 RX ORDER — LOPERAMIDE HYDROCHLORIDE 2 MG/1
CAPSULE ORAL
Qty: 30 CAPSULE | Refills: 1 | Status: SHIPPED | OUTPATIENT
Start: 2024-10-10

## 2024-10-14 ENCOUNTER — OFFICE VISIT (OUTPATIENT)
Dept: OPHTHALMOLOGY | Facility: CLINIC | Age: 64
End: 2024-10-14
Payer: COMMERCIAL

## 2024-10-14 DIAGNOSIS — H40.003 GLAUCOMA SUSPECT OF BOTH EYES: ICD-10-CM

## 2024-10-14 DIAGNOSIS — E08.3299: ICD-10-CM

## 2024-10-14 PROCEDURE — 92235 FLUORESCEIN ANGRPH MLTIFRAME: CPT

## 2024-10-14 PROCEDURE — 92083 EXTENDED VISUAL FIELD XM: CPT

## 2024-10-14 PROCEDURE — G0463 HOSPITAL OUTPT CLINIC VISIT: HCPCS

## 2024-10-14 PROCEDURE — 99214 OFFICE O/P EST MOD 30 MIN: CPT | Mod: GC

## 2024-10-14 ASSESSMENT — VISUAL ACUITY
METHOD: SNELLEN - LINEAR
OD_SC: 20/25
OS_SC: 20/40
OD_SC+: -2

## 2024-10-14 ASSESSMENT — REFRACTION_WEARINGRX
OD_ADD: +2.50
OD_AXIS: 172
OD_SPHERE: PLANO
OS_AXIS: 080
OD_CYLINDER: +1.00
OS_ADD: +2.50
OS_CYLINDER: +2.00
OS_SPHERE: -1.75

## 2024-10-14 ASSESSMENT — TONOMETRY
OD_IOP_MMHG: 18
IOP_METHOD: ICARE
OS_IOP_MMHG: 20

## 2024-10-14 ASSESSMENT — CONF VISUAL FIELD: COMMENTS: VF TODAY

## 2024-10-14 ASSESSMENT — EXTERNAL EXAM - LEFT EYE: OS_EXAM: NORMAL

## 2024-10-14 ASSESSMENT — EXTERNAL EXAM - RIGHT EYE: OD_EXAM: NORMAL

## 2024-10-14 ASSESSMENT — SLIT LAMP EXAM - LIDS
COMMENTS: NORMAL
COMMENTS: NORMAL

## 2024-10-14 ASSESSMENT — CUP TO DISC RATIO
OS_RATIO: 0.3
OD_RATIO: 0.3

## 2024-10-14 NOTE — PROGRESS NOTES
CC: Diabetic retinopathy     HPI: Ms Tang is here for follow-up. Noticed some new floaters.     PMHx:   DM  type II (13yr) on insulin      Imaging:  FA 10/14/2024   Right eye normal arm to eye, many MA, no NVD or NVE  Left eye many MA, moderate peripheral capillary drop out, no NVD or NVE    VF 24-2 low reliability both eyes 10/14/2024   Right eye ?inferior early nasal step, ?superior arcuate, overall nonspecific   Left eye ?nasal step, nonspecific       OCT: 6/13/24  Right eye: thinned retina with chronic exudates and changes, no IRF/SRF  Left eye: thinned retina with chronic exudates and changes, no IRF/SRF    OCT RNFL 06/13/2024  OD: thinning T/I  OS: Thinning S/T    Retina Laser procedures:  none    Intravitreal injections:  none    Assessment/ Plan: 10/14/2024       # moderate- severe NPDR with Diabetic macular edema both eyes  - improved diabetes control, her last A1C is 5.3 9/3/24  - no IRF/SRF on OCT both eyes last visit   - no NVE or NVD on FA today   - Emphasized BG/BP control     # Dry eye syndrome   Artificial tears over the counter    Monitor       # Myopia OS with secondary amblyopia  Does not wear glasses normally      # Pseudophakia both eyes   - s/p Yag cap 08/18/2022    # Ocular HTN   T33/29  /586  OCT RNFL at last visit showed some T/I thinning OD and S/T thinning OS   Baseline VF today with possible early nasal steps though very low reliability and a lot of non-specific changes both eyes   Recommend observation for now, follow-up in 6 months for repeat RNFL, might consider starting travatan OU at bedtime if pressure persists to be elevated    Follow-up in 6 months OCT macula and RNFL and 10-2 VF    Cuco Ge MD  Resident Physician, PGY-3  Department of Ophthalmology     Jaimie Beltran MD     [facility]  [facility]     Attending Physician Attestation:  Complete documentation of historical and exam elements from today's encounter can be found  in the full encounter summary report (not reduplicated in this progress note).  I personally obtained the chief complaint(s) and history of present illness.  I confirmed and edited as necessary the review of systems, past medical/surgical history, family history, social history, and examination findings as documented by others; and I examined the patient myself.  I personally reviewed the relevant tests, images, and reports as documented above.  I formulated and edited as necessary the assessment and plan and discussed the findings and management plan with the patient and family. Jaimie Beltran MD

## 2024-10-14 NOTE — NURSING NOTE
Chief Complaints and History of Present Illnesses   Patient presents with    Follow Up      moderate- severe NPDR with Diabetic macular edema both eyes     Chief Complaint(s) and History of Present Illness(es)       Follow Up              Comments:  moderate- severe NPDR with Diabetic macular edema both eyes              Comments    Pt states no change in VA since last visit  States floaters same as last visit both eyes  No flashes, eye pain or tearing   LBS: 125    Last A1C: 5.3  Lab Results       Component                Value               Date                       A1C                      5.3                 09/03/2024                 A1C                      5.5                 05/21/2024                 A1C                      6.2                 01/30/2024                 A1C                      7.0                 09/21/2023                 A1C                      6.7                 06/20/2023                 A1C                      6.5                 03/14/2023                 A1C                      6.4                 12/12/2022                 A1C                      9.1                 12/01/2020                 A1C                      9.7                 09/15/2020                 A1C                      8.6                 06/30/2020                 A1C                      6.2                 12/03/2019                 A1C                      6.6                 08/06/2019           Maria Elena Graves COT 12:11 PM October 14, 2024

## 2024-10-28 DIAGNOSIS — E11.3299 TYPE 2 DIABETES MELLITUS WITH MILD NONPROLIFERATIVE RETINOPATHY WITHOUT MACULAR EDEMA, WITH LONG-TERM CURRENT USE OF INSULIN, UNSPECIFIED LATERALITY (H): ICD-10-CM

## 2024-10-28 DIAGNOSIS — Z79.4 TYPE 2 DIABETES MELLITUS WITH MILD NONPROLIFERATIVE RETINOPATHY WITHOUT MACULAR EDEMA, WITH LONG-TERM CURRENT USE OF INSULIN, UNSPECIFIED LATERALITY (H): ICD-10-CM

## 2024-10-28 RX ORDER — TIRZEPATIDE 7.5 MG/.5ML
INJECTION, SOLUTION SUBCUTANEOUS
Qty: 2 ML | Refills: 0 | Status: SHIPPED | OUTPATIENT
Start: 2024-10-28

## 2024-10-28 NOTE — PROGRESS NOTES
Diabetes Consult Note        Nena Tang  is a 63 year old female who has a past medical history of Acute respiratory failure with hypoxia (H), CAD (coronary artery disease), Chronic low back pain, Cocaine abuse, in remission (H), Fecal urgency, History of heroin abuse (H), Hyperlipidemia LDL goal <100, Hypertension, Illiterate, Irritable bowel syndrome, Left cataract, Migraine, Migraine headache, Moderate major depression (H), Noncompliance with medication regimen, Obesity, CINDY (obstructive sleep apnea), CINDY (obstructive sleep apnea)- mild AHI 10.3, Osteopenia, Pneumonia of right lower lobe due to infectious organism, Schizoaffective disorder, depressive type (H), Sepsis (H), Suicidal intent, Takotsubo cardiomyopathy, Type 2 diabetes mellitus (H), Uterine cancer (H), and Verbal auditory hallucination. She is here for follow-up of diabetes.      I last saw Nena in May and we discussed increasing Mounjaro to 7.5 mg weekly and decreasing her NovoLog.    Interim:   She last met with pharmacist Eugenia De Jesus in early September and they continue to reduce her insulin dose due to low blood sugar.  They continued Mounjaro 7.5 mg but did not increase due to side effects.    Her blood pressure was not at goal and they asked for home blood pressure checks to consider restarting hydrochlorothiazide.    Also noted that she was due for a repeat DEXA, noted she was not checking vitamin D and recommended considering rechecking that.     Lantus was reduced to 24 units once daily.      Today:  There was most recent A1c was 5.3 on September 24.  This is likely not accurate as her hemoglobin has been decreased.        Hemoglobin   Date Value Ref Range Status   09/03/2024 10.3 (L) 11.7 - 15.7 g/dL Final   06/15/2021 11.1 (L) 11.7 - 15.7 g/dL Final       Radha reports that she feels great.  She is here today with staff Asif.   Her weight has continued to come down a bit.  Her blood pressures at home have been at  goal for the most part though at times the systolic is in the 140s.  Her diastolic is generally in the 70s and never greater than 90 per staff.  She continues to go to her day program.  We reviewed her CGMS together noted that she did have an instance of prolonged hypoglycemia on the morning of October 22.  She did have a higher blood sugar greater than 300 around 9 PM.  \Staff report that she generally has her dinnertime NovoLog at around 6 and bedtime at 8 PM.  I suspect her NovoLog dose was stacked leading to low blood sugar.  She continues to tolerate the Mounjaro well and denies any difficulties with nausea vomiting or constipation.  She does however have some loose stool but feels its much better, it used to last most of the week but states that is now mostly just on Wednesday when she receives her Mounjaro and Thursday.  They have not tried any fiber or anything similar to that though she has been trying to eat a bit more fiber.      She shares that she recently saw the podiatrist about 2 weeks ago and nurse checks her feet at home at least once a month.      Current Diabetes Medications:  Lantus 24 units daily    Humalog BG   70 - 150:  0 units  151 - 200: 2 units  201 - 250: 4 units  251-300: 6 units  301 - 350: 8 units  351 or greater : 10 units      Humalog 1 units per 50 greater than 200 at bedtime.    Mounjaro 7.5 mg weekly.       We reviewed glucometer/CGMS data together.  It revealed:  She has been using Dexcom CGMS.  See details below.  I am most concerned about prolonged hypoglycemia on the morning of October 22..  Average blood sugars 155 she is 66% time in range.      History of Diabetes monitoring and complications/ prevention:  CAD: yes  Last eye exam results: She does have retinopathy and sees a retinal specialist.    Neuropathy: yes  Nephropathy:CKD 3  HTN: yes On metoprolol and Cozaar  On statin: Lipitor 80 mg daily  On ASA:yes  Depression:yes      Nena's PMH, PSH and allergies were  reviewed today and pertinent information is summarized above.    Nena's pertinent social and family history are also reviewed today and pertinent information is summarized above.  Also noted is:Lives in group home.  Has lost many family members in recent years.  She lives at [facility] home in [place].  Her day program is called a [facility] of [facility].      Current Outpatient Medications   Medication Sig Dispense Refill    acetaminophen (TYLENOL) 500 MG tablet TAKE 2 TABLETS BY MOUTH THREE TIMES A  tablet 4    albuterol (PROAIR HFA/PROVENTIL HFA/VENTOLIN HFA) 108 (90 Base) MCG/ACT inhaler Inhale 2 puffs into the lungs every 6 hours as needed for shortness of breath, wheezing or cough 18 g 3    Alcohol Swabs (ALCOHOL PREP) 70 % PADS APPLY 1 UNITS TOPICALLY 8 TIMES DAILY 200 each 3    alendronate (FOSAMAX) 70 MG tablet TAKE 1 TABLET BY MOUTH EVERY 7 DAYS 4 tablet 11    amantadine (SYMMETREL) 100 MG capsule TAKE 1 CAPSULE BY MOUTH DAILY 28 capsule 11    amLODIPine (NORVASC) 10 MG tablet Take 1 tablet (10 mg) by mouth every morning 30 tablet 11    ASPIRIN LOW DOSE 81 MG EC tablet TAKE 1 TABLET BY MOUTH DAILY 28 tablet 11    atorvastatin (LIPITOR) 80 MG tablet TAKE 1 TABLET BY MOUTH DAILY 90 tablet 2    blood glucose (NO BRAND SPECIFIED) test strip Use to test blood sugar 10 times daily or as directed. 100 strip 11    calcium carbonate (OS-ALLEN) 1500 (600 Ca) MG tablet TAKE 1 TABLET BY MOUTH DAILY 90 tablet 2    chlorhexidine (PERIDEX) 0.12 % solution Swish and spit 10 mLs in mouth 2 times daily 1893 mL 3    clonazePAM (KLONOPIN) 0.5 MG tablet Take 1 tablet (0.5 mg) by mouth at bedtime. 28 tablet 5    Continuous Glucose Sensor (DEXCOM G6 SENSOR) MISC Change every 10 days. 9 each 4    Continuous Glucose Transmitter (DEXCOM G6 TRANSMITTER) MISC Change every 3 months. 1 each 4    diclofenac (VOLTAREN) 1 % topical gel APPLY 4 GRAMS TO PAINFUL AREA AT BEDTIME . MAY USE AN ADDITIONAL 3 DOSES DURING 100 g 1     gabapentin (NEURONTIN) 300 MG capsule Take 1 capsule (300 mg) by mouth at bedtime 28 capsule 11    HUMALOG KWIKPEN 100 UNIT/ML soln Inject 0-12 Units subcutaneously 4 times daily (with meals and nightly) Before meals: BG 81 - 150:  0 units 151 - 200: 2 units, 201 - 250: 4 units, 251-300: 6 units, 301 - 350: 8 units, 351 or greater : 10 units At BEDTIME-If >4 hours since last Humalog injection For  - 249 give 1 units, 250 - 299 give 2 units,300 - 349 give 3 units, = or > 350 give 4 units. 15 mL 1    hydrOXYzine (VISTARIL) 25 MG capsule TAKE 1 CAPSULE BY MOUTH EVERY NIGHT AT BEDTIME ANXIETY/SLEEP 28 capsule 0    ibuprofen (ADVIL/MOTRIN) 200 MG tablet Take 200 mg by mouth every 4 hours as needed for pain      insulin glargine (LANTUS SOLOSTAR) 100 UNIT/ML pen Inject 24 Units subcutaneously daily. 45 mL 1    insulin pen needle (BD AUTOSHIELD DUO) 30G X 5 MM USE 6 DAILY OR AS DIRECTED 200 each 10    Lancets (ONETOUCH DELICA PLUS DWZEQS11O) MISC 1 EACH AS NEEDED  USE TO TEST BLOOD SUGARS 1-2 TIMES DAILY AS DIRECTED 100 each 1    leflunomide (ARAVA) 20 MG tablet Take 1 tablet (20 mg) by mouth daily Labs every 8-12 weeks 90 tablet 0    Lidocaine (LIDOCAINE PAIN RELIEF) 4 % Patch APPLY 1 PATCH ONTO SKIN EVERY 24 HOURS ; APPLY AT NIGHT AND REMOVE IN THE MORNING ( TWELVE HOURS PATCH FREE) 30 patch 11    loperamide (IMODIUM) 2 MG capsule TAKE 1 CAPSULE BY MOUTH FOUR TIMES A DAY AS NEEDED FOR DIARRHEA 30 capsule 1    losartan (COZAAR) 100 MG tablet TAKE 1 TABLET BY MOUTH DAILY 28 tablet 11    Magnesium Oxide 250 MG TABS TAKE 1 TABLET BY MOUTH AT BEDTIME 28 tablet 11    melatonin 5 MG tablet Take 5 mg by mouth at bedtime      metoprolol succinate ER (TOPROL XL) 50 MG 24 hr tablet TAKE 1 TABLET BY MOUTH IN THE MORNING AND TAKE 2 TABLETS AT BEDTIME 84 tablet 11    mirabegron (MYRBETRIQ) 50 MG 24 hr tablet Take 1 tablet (50 mg) by mouth daily. 90 tablet 5    mirabegron (MYRBETRIQ) 50 MG 24 hr tablet TAKE 1 TABLET BY MOUTH  DAILY 28 tablet 1    mupirocin (BACTROBAN) 2 % external ointment Apply topically 2 times daily 22 g 0    naproxen (NAPROSYN) 375 MG tablet Take 1 tablet (375 mg) by mouth 2 times daily as needed for moderate pain (denatl pain) 60 tablet 11    NYSTOP 585301 UNIT/GM powder APPLY TOPICALLY TWICE A DAY AS NEEDED 60 g 0    ondansetron (ZOFRAN) 4 MG tablet TAKE 1 TABLET BY MOUTH EVERY 8 HOURS AS NEEDED FOR NAUSEA 10 tablet 1    order for DME Equipment being ordered: Depends. 30 each 4    order for DME Equipment being ordered: CPAP Supplies. 1 each 0    paliperidone ER (INVEGA) 6 MG 24 hr tablet TAKE 2 TABLETS BY MOUTH EVERY NIGHT AT BEDTIME 60 tablet 11    pantoprazole (PROTONIX) 40 MG EC tablet TAKE 1 TABLET (40 MG) BY MOUTH DAILY 28 tablet 11    prazosin (MINIPRESS) 1 MG capsule Take 1 mg by mouth at bedtime      QUEtiapine (SEROQUEL) 100 MG tablet Take 100 mg by mouth at bedtime      senna-docusate (SENOKOT-S/PERICOLACE) 8.6-50 MG tablet Take 1 tablet by mouth daily      senna-docusate (STOOL SOFTENER PLUS LAXATIVE) 8.6-50 MG tablet TAKE 1 TABLET BY MOUTH DAILY AND EXTRA 1 TABLET AS NEEDED FOR CONSTIPATION 90 tablet 0    sertraline (ZOLOFT) 100 MG tablet Take 100 mg by mouth daily      Tirzepatide (MOUNJARO) 7.5 MG/0.5ML SOAJ INJECT 7.5MG SUBCUTANEOUSLY ONCE A WEEK 2 mL 0    traZODone (DESYREL) 100 MG tablet TAKE 1 TABLET BY MOUTH AT BEDTIME 28 tablet 11    vitamin C (ASCORBIC ACID) 1000 MG TABS Take 1 tablet (1,000 mg) by mouth 2 times daily. 180 tablet 1    vitamin C (ASCORBIC ACID) 500 MG tablet TAKE 2 TABLETS BY MOUTH IN THE MORNING AND TAKE 2 TABLETS BY MOUTH IN THE EVENING 112 tablet 1    zinc oxide (DESITIN) 20 % external ointment Apply topically 3 times daily as needed for irritation (barrier cream) 60 g 3     Current Facility-Administered Medications   Medication Dose Route Frequency Provider Last Rate Last Admin    apraclonidine (IOPIDINE) 1 % ophthalmic solution 1 drop  1 drop Both Eyes Once Checo De La Cruz  "Tiburcio Newsome MD        lidocaine 1 % injection 4 mL  4 mL   Luiz Hernandez DO   4 mL at 06/22/23 1400    triamcinolone (KENALOG-40) injection 40 mg  40 mg   Luiz Hernandez DO   40 mg at 06/22/23 1400         ROS:   Reports good physical activity tolerance.  Denies any pedal lesions or vision changes or concerns. Denies any other acute concerns except as noted above.      Exam:    BP (!) 149/69   Pulse 71   Ht 1.549 m (5' 1\")   Wt 94.8 kg (209 lb)   LMP 01/06/2015 (Exact Date)   SpO2 98%   BMI 39.49 kg/m    Wt Readings from Last 10 Encounters:   10/29/24 94.8 kg (209 lb)   10/07/24 93 kg (205 lb)   09/03/24 93.7 kg (206 lb 8 oz)   08/02/24 96.7 kg (213 lb 3.2 oz)   07/18/24 97.5 kg (215 lb)   06/15/24 96.8 kg (213 lb 6.4 oz)   06/06/24 99.3 kg (219 lb)   05/21/24 99.8 kg (220 lb)   04/09/24 104.1 kg (229 lb 8 oz)   04/02/24 103.6 kg (228 lb 4.8 oz)     Estimated body mass index is 39.49 kg/m  as calculated from the following:    Height as of this encounter: 1.549 m (5' 1\").    Weight as of this encounter: 94.8 kg (209 lb).    General: Pleasant, well nourished and hydrated female in NAD.   Psych:  Mood is \"good,\" affect is appropriate.  Thought form and content are fluid and coherent.    HEENT: Eyes and sclera are clear. Extraocular movements are grossly intact without proptosis.  Nares are patent, mucous membranes moist.  Neck: No masses or JVD are noted.    Resp: Easy and unlabored breathing.   Neuro: Alert and oriented, communicating clearly.   Ext: no swelling or edema.  Good distal pulses and capillary refill in digits.  Skin in good condition without any lesions appreciated.  Data:      Recent Labs   Lab Test 09/03/24  1122 07/18/24  1044 05/21/24  1320 04/02/24  1203 01/30/24  1130 12/12/23  1212 09/23/23  1143 09/21/23  0908 05/19/23  0913 05/10/23  1709 09/27/22  2216 08/17/22  1138   A1C 5.3  --  5.5  --    < >  --   --  7.0*   < >  --    < >  --    TSH  --   --   --   --   --   --   --  2.86 "  --  1.60  --   --    LDL 69  --   --   --   --   --   --  81   < >  --   --   --    HDL 43*  --   --   --   --   --   --  68   < >  --   --   --    TRIG 142  --   --   --   --   --   --  136   < >  --   --   --    CR 0.89 0.85  --  1.17*  --   --    < >  --    < > 1.44*   < >  --    MICROL  --   --   --   --   --  42.2  --   --   --   --   --  11   AST 22  --   --  18  --   --    < >  --    < >  --    < >  --    ALT 22  --   --  17  --   --    < >  --    < >  --    < >  --     < > = values in this interval not displayed.       Microalbuminuria:  Lab Results   Component Value Date    UMALCR 29.10 (H) 12/12/2023    UMALCR 10.19 08/17/2022       Most recent GFRs:    Lab Results   Component Value Date    GFRESTIMATED 72 09/03/2024    GFRESTIMATED 77 07/18/2024    GFRESTIMATED 52 (L) 04/02/2024    GFRESTBLACK >90 06/15/2021    GFRESTBLACK >90 06/15/2021    GFRESTBLACK >90 06/04/2021       Lab Results   Component Value Date    CPEPT 1.4 07/28/2009     FIB-4 Calculation: 0.95 at 9/23/2023 11:43 AM  Calculated from:  SGOT/AST: 13 U/L at 9/20/2023 12:16 AM  SGPT/ALT: 26 U/L at 9/20/2023 12:16 AM  Platelets: 166 10e3/uL at 9/23/2023 11:43 AM  Age: 63 years  AST   Date Value Ref Range Status   09/03/2024 22 0 - 45 U/L Final   06/15/2021 12 0 - 45 U/L Final     Lab Results   Component Value Date    ALT 26 09/20/2023    ALT 23 06/15/2021         Most recent eye exam date: : Not Found       Assessment/Plan:    Nena Tang is a 63 year old female with much improved management of her type 2 diabetes.    Type 2 diabetes, well-controlled: Recent A1c at 5.3, recent CGMS average of 155 which correlates to a GMI of 7.0, this blood sugars at goal.  I am concerned about incidence of early morning hypoglycemia and I think that this is most likely due to NovoLog being stacked but as a precaution we will at this point decrease her Lantus to just 22 units nightly.  I am also advising staff to only give the bedtime NovoLog or Humalog  correction if it has been greater than 4 hours since the last dose and advising that it is okay to skip the dose if needed.  I also recommend that they try adding fiber tablet on Wednesdays and Thursdays to see if this helps with her loose stools but that they discontinue if they get worse.  If helpful I recommended they add this in on Tuesdays as well.          2. DM Complications-      Retinopathy:  Yes.  Now up-to-date.  Nephropathy:  Yes.  She does have microalbuminuria as of December.  She is on losartan 100 mg daily.  I will place an order for microalbuminuria to be updated in December.  Neuropathy: Denies difficulties.  Feet: Denies ulcers or lesions.  Foot exam is up-to-date.  Lipids: 10-year atherosclerotic cardiovascular disease risk >20%.  Patient taking a statin.      3. Obesity: Her weight now at 209 pounds and BMI is now less than 40.  Will continue her current dose of Mounjaro and continue to monitor weight and blood sugar, though I do note she may soon be eligible for graduation from diabetes optimization clinic.        30 minutes spent on the date of the encounter doing chart review, history and exam, documentation, education and counseling, as well as communication and coordination of care, and further activities as noted above.  This time includes time spent reviewing BG data.      It is my privilege to be involved in the care of the above patient.     Chanell Duque PA-C, MPAS  [facility]  Diabetes, Endocrinology, and Metabolism  [phone] Appointments/Nurse  [phone] pager  [phone] nurse line    This note was completed in part using Dragon voice recognition, and may contain word and grammatical errors.          Patient is showing 5/5 MNCM met.   10/28/24 10:34 AM : Appointment reminder phone call made to patient.Pt verbalized understanding and and to bring glucometer    Blood Glucose Monitoring :

## 2024-10-29 ENCOUNTER — OFFICE VISIT (OUTPATIENT)
Dept: ENDOCRINOLOGY | Facility: CLINIC | Age: 64
End: 2024-10-29
Payer: COMMERCIAL

## 2024-10-29 ENCOUNTER — TRANSFERRED RECORDS (OUTPATIENT)
Dept: MULTI SPECIALTY CLINIC | Facility: CLINIC | Age: 64
End: 2024-10-29

## 2024-10-29 VITALS
HEART RATE: 71 BPM | HEIGHT: 61 IN | SYSTOLIC BLOOD PRESSURE: 149 MMHG | BODY MASS INDEX: 39.46 KG/M2 | DIASTOLIC BLOOD PRESSURE: 69 MMHG | WEIGHT: 209 LBS | OXYGEN SATURATION: 98 %

## 2024-10-29 DIAGNOSIS — E66.01 MORBID OBESITY (H): ICD-10-CM

## 2024-10-29 DIAGNOSIS — N18.30 TYPE 2 DIABETES MELLITUS WITH STAGE 3 CHRONIC KIDNEY DISEASE, WITH LONG-TERM CURRENT USE OF INSULIN, UNSPECIFIED WHETHER STAGE 3A OR 3B CKD (H): Primary | ICD-10-CM

## 2024-10-29 DIAGNOSIS — R19.5 LOOSE STOOLS: ICD-10-CM

## 2024-10-29 DIAGNOSIS — Z79.4 TYPE 2 DIABETES MELLITUS WITH STAGE 3 CHRONIC KIDNEY DISEASE, WITH LONG-TERM CURRENT USE OF INSULIN, UNSPECIFIED WHETHER STAGE 3A OR 3B CKD (H): Primary | ICD-10-CM

## 2024-10-29 DIAGNOSIS — E11.22 TYPE 2 DIABETES MELLITUS WITH STAGE 3 CHRONIC KIDNEY DISEASE, WITH LONG-TERM CURRENT USE OF INSULIN, UNSPECIFIED WHETHER STAGE 3A OR 3B CKD (H): Primary | ICD-10-CM

## 2024-10-29 PROCEDURE — 99214 OFFICE O/P EST MOD 30 MIN: CPT | Performed by: PHYSICIAN ASSISTANT

## 2024-10-29 RX ORDER — INSULIN GLARGINE 100 [IU]/ML
22 INJECTION, SOLUTION SUBCUTANEOUS DAILY
Qty: 45 ML | Refills: 1 | Status: SHIPPED | OUTPATIENT
Start: 2024-10-29

## 2024-10-29 ASSESSMENT — PAIN SCALES - GENERAL: PAINLEVEL_OUTOF10: NO PAIN (0)

## 2024-10-29 NOTE — LETTER
10/29/2024       RE: Nena Tang  [address] [facility] [address]     Dear Colleague,    Thank you for referring your patient, Nena Tang, to the [facility] [place] at [facility]. Please see a copy of my visit note below.    I know I will have 1 left and I want go back and do it I got Folysil got a power through               Diabetes Consult Note        Nena Tang  is a 63 year old female who has a past medical history of Acute respiratory failure with hypoxia (H), CAD (coronary artery disease), Chronic low back pain, Cocaine abuse, in remission (H), Fecal urgency, History of heroin abuse (H), Hyperlipidemia LDL goal <100, Hypertension, Illiterate, Irritable bowel syndrome, Left cataract, Migraine, Migraine headache, Moderate major depression (H), Noncompliance with medication regimen, Obesity, CINDY (obstructive sleep apnea), CINDY (obstructive sleep apnea)- mild AHI 10.3, Osteopenia, Pneumonia of right lower lobe due to infectious organism, Schizoaffective disorder, depressive type (H), Sepsis (H), Suicidal intent, Takotsubo cardiomyopathy, Type 2 diabetes mellitus (H), Uterine cancer (H), and Verbal auditory hallucination. She is here for follow-up of diabetes.      I last saw Nena in May and we discussed increasing Mounjaro to 7.5 mg weekly and decreasing her NovoLog.    Interim:   She last met with pharmacist Eugenia De Jesus in early September and they continue to reduce her insulin dose due to low blood sugar.  They continued Mounjaro 7.5 mg but did not increase due to side effects.    Her blood pressure was not at goal and they asked for home blood pressure checks to consider restarting hydrochlorothiazide.    Also noted that she was due for a repeat DEXA, noted she was not checking vitamin D and recommended considering rechecking that.     Lantus was reduced to 24 units once daily.      Today:  There was most  recent A1c was 5.3 on September 24.  This is likely not accurate as her hemoglobin has been decreased.        Hemoglobin   Date Value Ref Range Status   09/03/2024 10.3 (L) 11.7 - 15.7 g/dL Final   06/15/2021 11.1 (L) 11.7 - 15.7 g/dL Final       Radha reports that she feels great.  She is here today with staff Asif.   Her weight has continued to come down a bit.  Her blood pressures at home have been at goal for the most part though at times the systolic is in the 140s.  Her diastolic is generally in the 70s and never greater than 90 per staff.  She continues to go to her day program.  We reviewed her CGMS together noted that she did have an instance of prolonged hypoglycemia on the morning of October 22.  She did have a higher blood sugar greater than 300 around 9 PM.  \Staff report that she generally has her dinnertime NovoLog at around 6 and bedtime at 8 PM.  I suspect her NovoLog dose was stacked leading to low blood sugar.  She continues to tolerate the Mounjaro well and denies any difficulties with nausea vomiting or constipation.  She does however have some loose stool but feels its much better, it used to last most of the week but states that is now mostly just on Wednesday when she receives her Mounjaro and Thursday.  They have not tried any fiber or anything similar to that though she has been trying to eat a bit more fiber.      She shares that she recently saw the podiatrist about 2 weeks ago and nurse checks her feet at home at least once a month.      Current Diabetes Medications:  Lantus 24 units daily    Humalog BG   70 - 150:  0 units  151 - 200: 2 units  201 - 250: 4 units  251-300: 6 units  301 - 350: 8 units  351 or greater : 10 units      Humalog 1 units per 50 greater than 200 at bedtime.    Mounjaro 7.5 mg weekly.       We reviewed glucometer/CGMS data together.  It revealed:  She has been using Dexcom CGMS.  See details below.  I am most concerned about prolonged hypoglycemia on the  morning of October 22..  Average blood sugars 155 she is 66% time in range.      History of Diabetes monitoring and complications/ prevention:  CAD: yes  Last eye exam results: She does have retinopathy and sees a retinal specialist.    Neuropathy: yes  Nephropathy:CKD 3  HTN: yes On metoprolol and Cozaar  On statin: Lipitor 80 mg daily  On ASA:yes  Depression:yes      Nena's PMH, PSH and allergies were reviewed today and pertinent information is summarized above.    Nena's pertinent social and family history are also reviewed today and pertinent information is summarized above.  Also noted is:Lives in group home.  Has lost many family members in recent years.  She lives at [facility] in [place].  Her day program is called a peace of mind.      Current Outpatient Medications   Medication Sig Dispense Refill     acetaminophen (TYLENOL) 500 MG tablet TAKE 2 TABLETS BY MOUTH THREE TIMES A  tablet 4     albuterol (PROAIR HFA/PROVENTIL HFA/VENTOLIN HFA) 108 (90 Base) MCG/ACT inhaler Inhale 2 puffs into the lungs every 6 hours as needed for shortness of breath, wheezing or cough 18 g 3     Alcohol Swabs (ALCOHOL PREP) 70 % PADS APPLY 1 UNITS TOPICALLY 8 TIMES DAILY 200 each 3     alendronate (FOSAMAX) 70 MG tablet TAKE 1 TABLET BY MOUTH EVERY 7 DAYS 4 tablet 11     amantadine (SYMMETREL) 100 MG capsule TAKE 1 CAPSULE BY MOUTH DAILY 28 capsule 11     amLODIPine (NORVASC) 10 MG tablet Take 1 tablet (10 mg) by mouth every morning 30 tablet 11     ASPIRIN LOW DOSE 81 MG EC tablet TAKE 1 TABLET BY MOUTH DAILY 28 tablet 11     atorvastatin (LIPITOR) 80 MG tablet TAKE 1 TABLET BY MOUTH DAILY 90 tablet 2     blood glucose (NO BRAND SPECIFIED) test strip Use to test blood sugar 10 times daily or as directed. 100 strip 11     calcium carbonate (OS-ALLEN) 1500 (600 Ca) MG tablet TAKE 1 TABLET BY MOUTH DAILY 90 tablet 2     chlorhexidine (PERIDEX) 0.12 % solution Swish and spit 10 mLs in mouth 2 times daily  1893 mL 3     clonazePAM (KLONOPIN) 0.5 MG tablet Take 1 tablet (0.5 mg) by mouth at bedtime. 28 tablet 5     Continuous Glucose Sensor (DEXCOM G6 SENSOR) MISC Change every 10 days. 9 each 4     Continuous Glucose Transmitter (DEXCOM G6 TRANSMITTER) MISC Change every 3 months. 1 each 4     diclofenac (VOLTAREN) 1 % topical gel APPLY 4 GRAMS TO PAINFUL AREA AT BEDTIME . MAY USE AN ADDITIONAL 3 DOSES DURING 100 g 1     gabapentin (NEURONTIN) 300 MG capsule Take 1 capsule (300 mg) by mouth at bedtime 28 capsule 11     HUMALOG KWIKPEN 100 UNIT/ML soln Inject 0-12 Units subcutaneously 4 times daily (with meals and nightly) Before meals: BG 81 - 150:  0 units 151 - 200: 2 units, 201 - 250: 4 units, 251-300: 6 units, 301 - 350: 8 units, 351 or greater : 10 units At BEDTIME-If >4 hours since last Humalog injection For  - 249 give 1 units, 250 - 299 give 2 units,300 - 349 give 3 units, = or > 350 give 4 units. 15 mL 1     hydrOXYzine (VISTARIL) 25 MG capsule TAKE 1 CAPSULE BY MOUTH EVERY NIGHT AT BEDTIME ANXIETY/SLEEP 28 capsule 0     ibuprofen (ADVIL/MOTRIN) 200 MG tablet Take 200 mg by mouth every 4 hours as needed for pain       insulin glargine (LANTUS SOLOSTAR) 100 UNIT/ML pen Inject 24 Units subcutaneously daily. 45 mL 1     insulin pen needle (BD AUTOSHIELD DUO) 30G X 5 MM USE 6 DAILY OR AS DIRECTED 200 each 10     Lancets (ONETOUCH DELICA PLUS SVKYIH30N) MISC 1 EACH AS NEEDED  USE TO TEST BLOOD SUGARS 1-2 TIMES DAILY AS DIRECTED 100 each 1     leflunomide (ARAVA) 20 MG tablet Take 1 tablet (20 mg) by mouth daily Labs every 8-12 weeks 90 tablet 0     Lidocaine (LIDOCAINE PAIN RELIEF) 4 % Patch APPLY 1 PATCH ONTO SKIN EVERY 24 HOURS ; APPLY AT NIGHT AND REMOVE IN THE MORNING ( TWELVE HOURS PATCH FREE) 30 patch 11     loperamide (IMODIUM) 2 MG capsule TAKE 1 CAPSULE BY MOUTH FOUR TIMES A DAY AS NEEDED FOR DIARRHEA 30 capsule 1     losartan (COZAAR) 100 MG tablet TAKE 1 TABLET BY MOUTH DAILY 28 tablet 11      Magnesium Oxide 250 MG TABS TAKE 1 TABLET BY MOUTH AT BEDTIME 28 tablet 11     melatonin 5 MG tablet Take 5 mg by mouth at bedtime       metoprolol succinate ER (TOPROL XL) 50 MG 24 hr tablet TAKE 1 TABLET BY MOUTH IN THE MORNING AND TAKE 2 TABLETS AT BEDTIME 84 tablet 11     mirabegron (MYRBETRIQ) 50 MG 24 hr tablet Take 1 tablet (50 mg) by mouth daily. 90 tablet 5     mirabegron (MYRBETRIQ) 50 MG 24 hr tablet TAKE 1 TABLET BY MOUTH DAILY 28 tablet 1     mupirocin (BACTROBAN) 2 % external ointment Apply topically 2 times daily 22 g 0     naproxen (NAPROSYN) 375 MG tablet Take 1 tablet (375 mg) by mouth 2 times daily as needed for moderate pain (denatl pain) 60 tablet 11     NYSTOP 536698 UNIT/GM powder APPLY TOPICALLY TWICE A DAY AS NEEDED 60 g 0     ondansetron (ZOFRAN) 4 MG tablet TAKE 1 TABLET BY MOUTH EVERY 8 HOURS AS NEEDED FOR NAUSEA 10 tablet 1     order for DME Equipment being ordered: Depends. 30 each 4     order for DME Equipment being ordered: CPAP Supplies. 1 each 0     paliperidone ER (INVEGA) 6 MG 24 hr tablet TAKE 2 TABLETS BY MOUTH EVERY NIGHT AT BEDTIME 60 tablet 11     pantoprazole (PROTONIX) 40 MG EC tablet TAKE 1 TABLET (40 MG) BY MOUTH DAILY 28 tablet 11     prazosin (MINIPRESS) 1 MG capsule Take 1 mg by mouth at bedtime       QUEtiapine (SEROQUEL) 100 MG tablet Take 100 mg by mouth at bedtime       senna-docusate (SENOKOT-S/PERICOLACE) 8.6-50 MG tablet Take 1 tablet by mouth daily       senna-docusate (STOOL SOFTENER PLUS LAXATIVE) 8.6-50 MG tablet TAKE 1 TABLET BY MOUTH DAILY AND EXTRA 1 TABLET AS NEEDED FOR CONSTIPATION 90 tablet 0     sertraline (ZOLOFT) 100 MG tablet Take 100 mg by mouth daily       Tirzepatide (MOUNJARO) 7.5 MG/0.5ML SOAJ INJECT 7.5MG SUBCUTANEOUSLY ONCE A WEEK 2 mL 0     traZODone (DESYREL) 100 MG tablet TAKE 1 TABLET BY MOUTH AT BEDTIME 28 tablet 11     vitamin C (ASCORBIC ACID) 1000 MG TABS Take 1 tablet (1,000 mg) by mouth 2 times daily. 180 tablet 1     vitamin C  "(ASCORBIC ACID) 500 MG tablet TAKE 2 TABLETS BY MOUTH IN THE MORNING AND TAKE 2 TABLETS BY MOUTH IN THE EVENING 112 tablet 1     zinc oxide (DESITIN) 20 % external ointment Apply topically 3 times daily as needed for irritation (barrier cream) 60 g 3     Current Facility-Administered Medications   Medication Dose Route Frequency Provider Last Rate Last Admin     apraclonidine (IOPIDINE) 1 % ophthalmic solution 1 drop  1 drop Both Eyes Once Tiburcio Chacon MD         lidocaine 1 % injection 4 mL  4 mL   Luiz Hernandez, DO   4 mL at 06/22/23 1400     triamcinolone (KENALOG-40) injection 40 mg  40 mg   Luiz Hernandez, DO   40 mg at 06/22/23 1400         ROS:   Reports good physical activity tolerance.  Denies any pedal lesions or vision changes or concerns. Denies any other acute concerns except as noted above.      Exam:    BP (!) 149/69   Pulse 71   Ht 1.549 m (5' 1\")   Wt 94.8 kg (209 lb)   LMP 01/06/2015 (Exact Date)   SpO2 98%   BMI 39.49 kg/m    Wt Readings from Last 10 Encounters:   10/29/24 94.8 kg (209 lb)   10/07/24 93 kg (205 lb)   09/03/24 93.7 kg (206 lb 8 oz)   08/02/24 96.7 kg (213 lb 3.2 oz)   07/18/24 97.5 kg (215 lb)   06/15/24 96.8 kg (213 lb 6.4 oz)   06/06/24 99.3 kg (219 lb)   05/21/24 99.8 kg (220 lb)   04/09/24 104.1 kg (229 lb 8 oz)   04/02/24 103.6 kg (228 lb 4.8 oz)     Estimated body mass index is 39.49 kg/m  as calculated from the following:    Height as of this encounter: 1.549 m (5' 1\").    Weight as of this encounter: 94.8 kg (209 lb).    General: Pleasant, well nourished and hydrated female in NAD.   Psych:  Mood is \"good,\" affect is appropriate.  Thought form and content are fluid and coherent.    HEENT: Eyes and sclera are clear. Extraocular movements are grossly intact without proptosis.  Nares are patent, mucous membranes moist.  Neck: No masses or JVD are noted.    Resp: Easy and unlabored breathing.   Neuro: Alert and oriented, communicating clearly.   Ext: " no swelling or edema.  Good distal pulses and capillary refill in digits.  Skin in good condition without any lesions appreciated.  Data:      Recent Labs   Lab Test 09/03/24  1122 07/18/24  1044 05/21/24  1320 04/02/24  1203 01/30/24  1130 12/12/23  1212 09/23/23  1143 09/21/23  0908 05/19/23  0913 05/10/23  1709 09/27/22  2216 08/17/22  1138   A1C 5.3  --  5.5  --    < >  --   --  7.0*   < >  --    < >  --    TSH  --   --   --   --   --   --   --  2.86  --  1.60  --   --    LDL 69  --   --   --   --   --   --  81   < >  --   --   --    HDL 43*  --   --   --   --   --   --  68   < >  --   --   --    TRIG 142  --   --   --   --   --   --  136   < >  --   --   --    CR 0.89 0.85  --  1.17*  --   --    < >  --    < > 1.44*   < >  --    MICROL  --   --   --   --   --  42.2  --   --   --   --   --  11   AST 22  --   --  18  --   --    < >  --    < >  --    < >  --    ALT 22  --   --  17  --   --    < >  --    < >  --    < >  --     < > = values in this interval not displayed.       Microalbuminuria:  Lab Results   Component Value Date    UMALCR 29.10 (H) 12/12/2023    UMALCR 10.19 08/17/2022       Most recent GFRs:    Lab Results   Component Value Date    GFRESTIMATED 72 09/03/2024    GFRESTIMATED 77 07/18/2024    GFRESTIMATED 52 (L) 04/02/2024    GFRESTBLACK >90 06/15/2021    GFRESTBLACK >90 06/15/2021    GFRESTBLACK >90 06/04/2021       Lab Results   Component Value Date    CPEPT 1.4 07/28/2009     FIB-4 Calculation: 0.95 at 9/23/2023 11:43 AM  Calculated from:  SGOT/AST: 13 U/L at 9/20/2023 12:16 AM  SGPT/ALT: 26 U/L at 9/20/2023 12:16 AM  Platelets: 166 10e3/uL at 9/23/2023 11:43 AM  Age: 63 years  AST   Date Value Ref Range Status   09/03/2024 22 0 - 45 U/L Final   06/15/2021 12 0 - 45 U/L Final     Lab Results   Component Value Date    ALT 26 09/20/2023    ALT 23 06/15/2021         Most recent eye exam date: : Not Found       Assessment/Plan:    Nena FRANKS Kitty is a 63 year old female with much improved  management of her type 2 diabetes.    Type 2 diabetes, well-controlled: Recent A1c at 5.3, recent CGMS average of 155 which correlates to a GMI of 7.0, this blood sugars at goal.  I am concerned about incidence of early morning hypoglycemia and I think that this is most likely due to NovoLog being stacked but as a precaution we will at this point decrease her Lantus to just 22 units nightly.  I am also advising staff to only give the bedtime NovoLog or Humalog correction if it has been greater than 4 hours since the last dose and advising that it is okay to skip the dose if needed.  I also recommend that they try adding fiber tablet on Wednesdays and Thursdays to see if this helps with her loose stools but that they discontinue if they get worse.  If helpful I recommended they add this in on Tuesdays as well.          2. DM Complications-      Retinopathy:  Yes.  Now up-to-date.  Nephropathy:  Yes.  She does have microalbuminuria as of December.  She is on losartan 100 mg daily.  I will place an order for microalbuminuria to be updated in December.  Neuropathy: Denies difficulties.  Feet: Denies ulcers or lesions.  Foot exam is up-to-date.  Lipids: 10-year atherosclerotic cardiovascular disease risk >20%.  Patient taking a statin.      3. Obesity: Her weight now at 209 pounds and BMI is now less than 40.  Will continue her current dose of Mounjaro and continue to monitor weight and blood sugar, though I do note she may soon be eligible for graduation from diabetes optimization clinic.        30 minutes spent on the date of the encounter doing chart review, history and exam, documentation, education and counseling, as well as communication and coordination of care, and further activities as noted above.  This time includes time spent reviewing BG data.      It is my privilege to be involved in the care of the above patient.     Chanell Duque PA-C, MPAS  [facility]  Diabetes, Endocrinology, and  Metabolism  [phone] Appointments/Nurse  [phone] pager  [phone] nurse line    This note was completed in part using Dragon voice recognition, and may contain word and grammatical errors.          Patient is showing 5/5 MNCM met.   10/28/24 10:34 AM : Appointment reminder phone call made to patient.Pt verbalized understanding and and to bring glucometer    Blood Glucose Monitoring :               Again, thank you for allowing me to participate in the care of your patient.      Sincerely,    Chanell Duque PA-C

## 2024-10-29 NOTE — PATIENT INSTRUCTIONS
Keep up your great work!      Very concerned about prolonged LOW blood sugar early morning of October 22.    I suspect diner and bedtime Novolog/Humalog dose too close together.    Reduce Lantus to 22 units.  2.  ONLY give bedtime Novolog / Humalog correction if > 4 hours since last dose.  Okay to OMIT dose if it has not been 4 hours.    3. Add fiber tablet Wednesday and Thursday if helpful with loose stools.  Discontinue if worse.  May add Tuesday as well if helpful, but nor resolved.      My best wishes,  Chanell Duque

## 2024-11-05 ENCOUNTER — OFFICE VISIT (OUTPATIENT)
Dept: FAMILY MEDICINE | Facility: CLINIC | Age: 64
End: 2024-11-05
Attending: FAMILY MEDICINE
Payer: COMMERCIAL

## 2024-11-05 VITALS
HEIGHT: 61 IN | DIASTOLIC BLOOD PRESSURE: 72 MMHG | OXYGEN SATURATION: 97 % | BODY MASS INDEX: 39.27 KG/M2 | RESPIRATION RATE: 15 BRPM | WEIGHT: 208 LBS | TEMPERATURE: 96.8 F | HEART RATE: 75 BPM | SYSTOLIC BLOOD PRESSURE: 152 MMHG

## 2024-11-05 DIAGNOSIS — K59.00 CONSTIPATION, UNSPECIFIED CONSTIPATION TYPE: ICD-10-CM

## 2024-11-05 DIAGNOSIS — I10 ESSENTIAL HYPERTENSION WITH GOAL BLOOD PRESSURE LESS THAN 130/80: ICD-10-CM

## 2024-11-05 DIAGNOSIS — G89.29 CHRONIC RIGHT-SIDED LOW BACK PAIN WITHOUT SCIATICA: ICD-10-CM

## 2024-11-05 DIAGNOSIS — D64.9 ANEMIA, UNSPECIFIED TYPE: ICD-10-CM

## 2024-11-05 DIAGNOSIS — E55.9 VITAMIN D DEFICIENCY: ICD-10-CM

## 2024-11-05 DIAGNOSIS — Z23 NEED FOR COVID-19 VACCINE: ICD-10-CM

## 2024-11-05 DIAGNOSIS — M25.50 POLYARTHRALGIA: ICD-10-CM

## 2024-11-05 DIAGNOSIS — F25.1 SCHIZOAFFECTIVE DISORDER, DEPRESSIVE TYPE (H): ICD-10-CM

## 2024-11-05 DIAGNOSIS — M54.50 CHRONIC RIGHT-SIDED LOW BACK PAIN WITHOUT SCIATICA: ICD-10-CM

## 2024-11-05 DIAGNOSIS — Z23 NEEDS FLU SHOT: ICD-10-CM

## 2024-11-05 DIAGNOSIS — F33.2 SEVERE EPISODE OF RECURRENT MAJOR DEPRESSIVE DISORDER, WITHOUT PSYCHOTIC FEATURES (H): Primary | ICD-10-CM

## 2024-11-05 LAB
BASOPHILS # BLD AUTO: 0 10E3/UL (ref 0–0.2)
BASOPHILS NFR BLD AUTO: 0 %
CRP SERPL-MCNC: 7.69 MG/L
EOSINOPHIL # BLD AUTO: 0.1 10E3/UL (ref 0–0.7)
EOSINOPHIL NFR BLD AUTO: 2 %
ERYTHROCYTE [DISTWIDTH] IN BLOOD BY AUTOMATED COUNT: 13 % (ref 10–15)
FOLATE SERPL-MCNC: 6.8 NG/ML (ref 4.6–34.8)
HCT VFR BLD AUTO: 31.1 % (ref 35–47)
HGB BLD-MCNC: 9.7 G/DL (ref 11.7–15.7)
IMM GRANULOCYTES # BLD: 0 10E3/UL
IMM GRANULOCYTES NFR BLD: 0 %
LYMPHOCYTES # BLD AUTO: 1.6 10E3/UL (ref 0.8–5.3)
LYMPHOCYTES NFR BLD AUTO: 32 %
MCH RBC QN AUTO: 31.3 PG (ref 26.5–33)
MCHC RBC AUTO-ENTMCNC: 31.2 G/DL (ref 31.5–36.5)
MCV RBC AUTO: 100 FL (ref 78–100)
MONOCYTES # BLD AUTO: 0.5 10E3/UL (ref 0–1.3)
MONOCYTES NFR BLD AUTO: 10 %
NEUTROPHILS # BLD AUTO: 2.7 10E3/UL (ref 1.6–8.3)
NEUTROPHILS NFR BLD AUTO: 56 %
PLATELET # BLD AUTO: 211 10E3/UL (ref 150–450)
RBC # BLD AUTO: 3.1 10E6/UL (ref 3.8–5.2)
VIT B12 SERPL-MCNC: 355 PG/ML (ref 232–1245)
WBC # BLD AUTO: 4.9 10E3/UL (ref 4–11)

## 2024-11-05 PROCEDURE — 86140 C-REACTIVE PROTEIN: CPT | Performed by: FAMILY MEDICINE

## 2024-11-05 PROCEDURE — 82746 ASSAY OF FOLIC ACID SERUM: CPT | Performed by: FAMILY MEDICINE

## 2024-11-05 PROCEDURE — 85025 COMPLETE CBC W/AUTO DIFF WBC: CPT | Performed by: FAMILY MEDICINE

## 2024-11-05 PROCEDURE — G0008 ADMIN INFLUENZA VIRUS VAC: HCPCS | Performed by: FAMILY MEDICINE

## 2024-11-05 PROCEDURE — 90480 ADMN SARSCOV2 VAC 1/ONLY CMP: CPT | Performed by: FAMILY MEDICINE

## 2024-11-05 PROCEDURE — 83921 ORGANIC ACID SINGLE QUANT: CPT | Performed by: FAMILY MEDICINE

## 2024-11-05 PROCEDURE — 82607 VITAMIN B-12: CPT | Performed by: FAMILY MEDICINE

## 2024-11-05 PROCEDURE — 99214 OFFICE O/P EST MOD 30 MIN: CPT | Performed by: FAMILY MEDICINE

## 2024-11-05 PROCEDURE — 36415 COLL VENOUS BLD VENIPUNCTURE: CPT | Performed by: FAMILY MEDICINE

## 2024-11-05 PROCEDURE — 91320 SARSCV2 VAC 30MCG TRS-SUC IM: CPT | Performed by: FAMILY MEDICINE

## 2024-11-05 PROCEDURE — 90673 RIV3 VACCINE NO PRESERV IM: CPT | Performed by: FAMILY MEDICINE

## 2024-11-05 RX ORDER — GABAPENTIN 300 MG/1
300 CAPSULE ORAL AT BEDTIME
Qty: 28 CAPSULE | Refills: 11 | Status: SHIPPED | OUTPATIENT
Start: 2024-11-05

## 2024-11-05 RX ORDER — FAMOTIDINE 20 MG
2 TABLET ORAL DAILY
Qty: 60 CAPSULE | Refills: 3 | Status: SHIPPED | OUTPATIENT
Start: 2024-11-05

## 2024-11-05 RX ORDER — BUPROPION HYDROCHLORIDE 150 MG/1
150 TABLET ORAL EVERY MORNING
Qty: 30 TABLET | Refills: 11 | Status: SHIPPED | OUTPATIENT
Start: 2024-11-05

## 2024-11-05 RX ORDER — STANDARDIZED SENNA CONCENTRATE AND DOCUSATE SODIUM 8.6; 5 MG/1; MG/1
TABLET ORAL
Qty: 28 TABLET | Refills: 3 | Status: SHIPPED | OUTPATIENT
Start: 2024-11-05

## 2024-11-05 ASSESSMENT — PATIENT HEALTH QUESTIONNAIRE - PHQ9
SUM OF ALL RESPONSES TO PHQ QUESTIONS 1-9: 24
10. IF YOU CHECKED OFF ANY PROBLEMS, HOW DIFFICULT HAVE THESE PROBLEMS MADE IT FOR YOU TO DO YOUR WORK, TAKE CARE OF THINGS AT HOME, OR GET ALONG WITH OTHER PEOPLE: SOMEWHAT DIFFICULT
SUM OF ALL RESPONSES TO PHQ QUESTIONS 1-9: 24

## 2024-11-05 ASSESSMENT — PAIN SCALES - GENERAL: PAINLEVEL_OUTOF10: SEVERE PAIN (7)

## 2024-11-05 NOTE — PROGRESS NOTES
Assessment & Plan     Severe episode of recurrent major depressive disorder, without psychotic features (H)  Patient endorses significant symptoms of depression.  She does see psychiatry outside the clinic so I recommended she double check any medication additions or changes with the psychiatrist.  It appears she may have been on bupropion in distant past, over 10 years ago but unclear why it was stopped and the patient does not recall.  Given her level of fatigue I thought this might be a reasonable choice and we will start with low-dose and recheck in about 4 to 5 weeks.  - buPROPion (WELLBUTRIN XL) 150 MG 24 hr tablet; Take 1 tablet (150 mg) by mouth every morning.    Need for COVID-19 vaccine  COVID-vaccine was provided today.  - COVID-19 12+ (PFIZER)    Needs flu shot  Flu shot was provided today.  - INFLUENZA VACCINE TRIVALENT(FLUBLOK)    Anemia, unspecified type  Anemia is present which I suspect is probably an anemia of chronic disease.  I did want to do some testing for deficiencies today.  - CBC with platelets and differential; Future  - Vitamin B12; Future  - CRP, inflammation; Future  - Methylmalonic Acid; Future  - Folate; Future  - CBC with platelets and differential  - Vitamin B12  - CRP, inflammation  - Methylmalonic Acid  - Folate    Vitamin D deficiency  Vitamin D deficiency has been noted with previous labs so prescription was sent to the pharmacy.  - Vitamin D, Cholecalciferol, 25 MCG (1000 UT) CAPS; Take 2 capsules by mouth daily.    Essential hypertension with goal blood pressure less than 130/80  Blood pressure remained elevated today.  Will check at next visit and adjust medications if needed.    Schizoaffective disorder, depressive type (H)  Currently in depressive type but denies any evidence of psychosis.  See psychiatry.  Will continue to monitor.        Blood sugar testing frequency justification:  Frequent hypoglycemia and Adjustment of medication(s)  Depression Screening Follow  Up        11/5/2024    10:54 AM   PHQ   PHQ-9 Total Score 24    Q9: Thoughts of better off dead/self-harm past 2 weeks Nearly every day    F/U: Thoughts of suicide or self-harm Yes    F/U: Self harm-plan No    F/U: Self-harm action No    F/U: Safety concerns No        Patient-reported         11/5/2024    10:54 AM   Last PHQ-9   1.  Little interest or pleasure in doing things 3    2.  Feeling down, depressed, or hopeless 3    3.  Trouble falling or staying asleep, or sleeping too much 3    4.  Feeling tired or having little energy 3    5.  Poor appetite or overeating 0    6.  Feeling bad about yourself 3    7.  Trouble concentrating 3    8.  Moving slowly or restless 3    Q9: Thoughts of better off dead/self-harm past 2 weeks 3    PHQ-9 Total Score 24    In the past two weeks have you had thoughts of suicide or self harm? Yes    Do you have concerns about your personal safety or the safety of others? No    In the past 2 weeks have you thought about a plan or had intention to harm yourself? No    In the past 2 weeks have you acted on these thoughts in any way? No        Patient-reported               11/6/2024     7:25 PM   C-SSRS (Brief [place])   Within the last month, have you wished you were dead or wished you could go to sleep and not wake up? Yes   Within the last month, have you had any actual thoughts of killing yourself? No   Within the last month, have you ever done anything, started to do anything, or prepared to do anything to end your life? No               Follow Up Actions Taken  Crisis resource information provided in the After Visit Summary  Referred patient back to mental health provider    Discussed the following ways the patient can remain in a safe environment:      FUTURE APPOINTMENTS:       - Follow-up visit in 1 to 2 months    Surjit Barrett is a 64 year old, presenting for the following health issues:  Follow Up        11/5/2024    10:54 AM   Additional Questions   Roomed by Mary RAMOS  "        11/5/2024   Forms   Any forms needing to be completed Yes        History of Present Illness       Diabetes:   She presents for follow up of diabetes.  She is checking home blood glucose two times daily.   She checks blood glucose before and after meals.  Blood glucose is sometimes over 200 and sometimes under 70. She is aware of hypoglycemia symptoms including shakiness.    She has no concerns regarding her diabetes at this time.  She is having weight loss.            Hypertension: She presents for follow up of hypertension.  She does check blood pressure  regularly outside of the clinic. Outside blood pressures have been over 140/90. She does not follow a low salt diet.     She eats 2-3 servings of fruits and vegetables daily.She consumes 1 sweetened beverage(s) daily.   She is taking medications regularly.           Patient is here for routine follow-up.  She reports she feels well at this time.  She does confirm increased symptoms of depression but thinks there might be some seasonality to this.  Recently saw endocrinology for her diabetes and that note was reviewed.  Was here with staff from the group home where she lives and they did not express any concerns today.      Review of Systems  Constitutional, HEENT, cardiovascular, pulmonary, gi and gu systems are negative, except as otherwise noted.      Objective    BP (!) 152/72 (BP Location: Right arm, Patient Position: Sitting, Cuff Size: Adult Large)   Pulse 75   Temp 96.8  F (36  C) (Temporal)   Resp 15   Ht 1.54 m (5' 0.63\")   Wt 94.3 kg (208 lb)   LMP 01/06/2015 (Exact Date)   SpO2 97%   BMI 39.78 kg/m    Body mass index is 39.78 kg/m .  Physical Exam   GENERAL: alert and no distress  EYES: Eyes grossly normal to inspection, PERRL and conjunctivae and sclerae normal  HENT: ear canals and TM's normal, nose and mouth without ulcers or lesions  NECK: no adenopathy, no asymmetry, masses, or scars  RESP: lungs clear to auscultation - no rales, " rhonchi or wheezes  CV: regular rate and rhythm, normal S1 S2, no S3 or S4, no murmur, click or rub, no peripheral edema   MS: no gross musculoskeletal defects noted, no edema  SKIN: no suspicious lesions or rashes  NEURO: Normal strength and tone, mentation intact and speech normal  PSYCH: mentation appears normal, affect flat, and fatigued    Office Visit on 09/03/2024   Component Date Value Ref Range Status    Hemoglobin A1C 09/03/2024 5.3  0.0 - 5.6 % Final    Normal <5.7%   Prediabetes 5.7-6.4%    Diabetes 6.5% or higher     Note: Adopted from ADA consensus guidelines.    Cholesterol 09/03/2024 140  <200 mg/dL Final    Triglycerides 09/03/2024 142  <150 mg/dL Final    Direct Measure HDL 09/03/2024 43 (L)  >=50 mg/dL Final    LDL Cholesterol Calculated 09/03/2024 69  <=100 mg/dL Final    Non HDL Cholesterol 09/03/2024 97  <130 mg/dL Final    Patient Fasting > 8hrs? 09/03/2024 Unknown   Final    Sodium 09/03/2024 138  135 - 145 mmol/L Final    Potassium 09/03/2024 4.3  3.4 - 5.3 mmol/L Final    Carbon Dioxide (CO2) 09/03/2024 24  22 - 29 mmol/L Final    Anion Gap 09/03/2024 10  7 - 15 mmol/L Final    Urea Nitrogen 09/03/2024 12.0  8.0 - 23.0 mg/dL Final    Creatinine 09/03/2024 0.89  0.51 - 0.95 mg/dL Final    GFR Estimate 09/03/2024 72  >60 mL/min/1.73m2 Final    eGFR calculated using 2021 CKD-EPI equation.    Calcium 09/03/2024 9.5  8.8 - 10.4 mg/dL Final    Reference intervals for this test were updated on 7/16/2024 to reflect our healthy population more accurately. There may be differences in the flagging of prior results with similar values performed with this method. Those prior results can be interpreted in the context of the updated reference intervals.    Chloride 09/03/2024 104  98 - 107 mmol/L Final    Glucose 09/03/2024 100 (H)  70 - 99 mg/dL Final    Alkaline Phosphatase 09/03/2024 72  40 - 150 U/L Final    AST 09/03/2024 22  0 - 45 U/L Final    ALT 09/03/2024 22  0 - 50 U/L Final    Protein Total  09/03/2024 7.3  6.4 - 8.3 g/dL Final    Albumin 09/03/2024 4.3  3.5 - 5.2 g/dL Final    Bilirubin Total 09/03/2024 0.2  <=1.2 mg/dL Final    Patient Fasting > 8hrs? 09/03/2024 Unknown   Final    Vitamin D, Total (25-Hydroxy) 09/03/2024 17 (L)  20 - 50 ng/mL Final    mild to moderate deficiency    WBC Count 09/03/2024 5.5  4.0 - 11.0 10e3/uL Final    RBC Count 09/03/2024 3.30 (L)  3.80 - 5.20 10e6/uL Final    Hemoglobin 09/03/2024 10.3 (L)  11.7 - 15.7 g/dL Final    Hematocrit 09/03/2024 31.9 (L)  35.0 - 47.0 % Final    MCV 09/03/2024 97  78 - 100 fL Final    MCH 09/03/2024 31.2  26.5 - 33.0 pg Final    MCHC 09/03/2024 32.3  31.5 - 36.5 g/dL Final    RDW 09/03/2024 13.0  10.0 - 15.0 % Final    Platelet Count 09/03/2024 207  150 - 450 10e3/uL Final    % Neutrophils 09/03/2024 51  % Final    % Lymphocytes 09/03/2024 36  % Final    % Monocytes 09/03/2024 11  % Final    % Eosinophils 09/03/2024 2  % Final    % Basophils 09/03/2024 0  % Final    % Immature Granulocytes 09/03/2024 0  % Final    Absolute Neutrophils 09/03/2024 2.8  1.6 - 8.3 10e3/uL Final    Absolute Lymphocytes 09/03/2024 2.0  0.8 - 5.3 10e3/uL Final    Absolute Monocytes 09/03/2024 0.6  0.0 - 1.3 10e3/uL Final    Absolute Eosinophils 09/03/2024 0.1  0.0 - 0.7 10e3/uL Final    Absolute Basophils 09/03/2024 0.0  0.0 - 0.2 10e3/uL Final    Absolute Immature Granulocytes 09/03/2024 0.0  <=0.4 10e3/uL Final           Signed Electronically by: Albino Rainey MD

## 2024-11-06 ASSESSMENT — COLUMBIA-SUICIDE SEVERITY RATING SCALE - C-SSRS
2. IN THE PAST MONTH, HAVE YOU ACTUALLY HAD ANY THOUGHTS OF KILLING YOURSELF?: NO
1. WITHIN THE PAST MONTH, HAVE YOU WISHED YOU WERE DEAD OR WISHED YOU COULD GO TO SLEEP AND NOT WAKE UP?: YES
6. HAVE YOU EVER DONE ANYTHING, STARTED TO DO ANYTHING, OR PREPARED TO DO ANYTHING TO END YOUR LIFE?: NO

## 2024-11-07 LAB — METHYLMALONATE SERPL-SCNC: 0.16 UMOL/L (ref 0–0.4)

## 2024-11-08 NOTE — TELEPHONE ENCOUNTER
leflunomide (ARAVA) 20 MG tablet   90 tablet 0 8/14/2024       Last Office Visit : 4-2-2024  Future Office visit:  none    CBC RESULTS:   Recent Labs   Lab Test 11/05/24  1147   WBC 4.9   RBC 3.10*   HGB 9.7*   HCT 31.1*      MCH 31.3   MCHC 31.2*   RDW 13.0          Creatinine   Date Value Ref Range Status   09/03/2024 0.89 0.51 - 0.95 mg/dL Final   06/15/2021 0.78 0.52 - 1.04 mg/dL Final   ]    Liver Function Studies -   Recent Labs   Lab Test 09/03/24  1122   PROTTOTAL 7.3   ALBUMIN 4.3   BILITOTAL 0.2   ALKPHOS 72   AST 22   ALT 22

## 2024-11-09 ENCOUNTER — TELEPHONE (OUTPATIENT)
Dept: RHEUMATOLOGY | Facility: CLINIC | Age: 64
End: 2024-11-09
Payer: COMMERCIAL

## 2024-11-09 RX ORDER — LEFLUNOMIDE 20 MG/1
TABLET ORAL
Qty: 30 TABLET | Refills: 0 | Status: SHIPPED | OUTPATIENT
Start: 2024-11-09

## 2024-11-09 NOTE — TELEPHONE ENCOUNTER
Jordon Torres:     How can we get this patient in for an appt? I gave her one month supply of leflunomide until she can come and be seen. Thanks a lot.

## 2024-11-26 ENCOUNTER — OFFICE VISIT (OUTPATIENT)
Dept: RHEUMATOLOGY | Facility: CLINIC | Age: 64
End: 2024-11-26
Attending: INTERNAL MEDICINE
Payer: COMMERCIAL

## 2024-11-26 ENCOUNTER — LAB (OUTPATIENT)
Dept: LAB | Facility: CLINIC | Age: 64
End: 2024-11-26
Payer: COMMERCIAL

## 2024-11-26 VITALS
SYSTOLIC BLOOD PRESSURE: 149 MMHG | OXYGEN SATURATION: 96 % | DIASTOLIC BLOOD PRESSURE: 82 MMHG | BODY MASS INDEX: 39.4 KG/M2 | HEART RATE: 74 BPM | WEIGHT: 206 LBS

## 2024-11-26 DIAGNOSIS — R76.8 RHEUMATOID FACTOR POSITIVE: ICD-10-CM

## 2024-11-26 DIAGNOSIS — D64.9 LOW HEMOGLOBIN: ICD-10-CM

## 2024-11-26 DIAGNOSIS — M79.662 PAIN OF LEFT CALF: ICD-10-CM

## 2024-11-26 DIAGNOSIS — Z79.4 TYPE 2 DIABETES MELLITUS WITH STAGE 3A CHRONIC KIDNEY DISEASE, WITH LONG-TERM CURRENT USE OF INSULIN (H): Primary | ICD-10-CM

## 2024-11-26 DIAGNOSIS — N18.31 TYPE 2 DIABETES MELLITUS WITH STAGE 3A CHRONIC KIDNEY DISEASE, WITH LONG-TERM CURRENT USE OF INSULIN (H): Primary | ICD-10-CM

## 2024-11-26 DIAGNOSIS — E11.22 TYPE 2 DIABETES MELLITUS WITH STAGE 3A CHRONIC KIDNEY DISEASE, WITH LONG-TERM CURRENT USE OF INSULIN (H): Primary | ICD-10-CM

## 2024-11-26 DIAGNOSIS — M15.9 GENERALIZED OA: ICD-10-CM

## 2024-11-26 DIAGNOSIS — R76.8 POSITIVE ANA (ANTINUCLEAR ANTIBODY): ICD-10-CM

## 2024-11-26 DIAGNOSIS — M19.90 INFLAMMATORY ARTHRITIS: Primary | ICD-10-CM

## 2024-11-26 DIAGNOSIS — R76.8 CENTROMERE ANTIBODY POSITIVE: ICD-10-CM

## 2024-11-26 LAB
CREAT UR-MCNC: 214 MG/DL
EST. AVERAGE GLUCOSE BLD GHB EST-MCNC: 120 MG/DL
HBA1C MFR BLD: 5.8 % (ref 0–5.6)
HBV CORE AB SERPL QL IA: REACTIVE
HBV SURFACE AB SERPL IA-ACNC: >1000 M[IU]/ML
HBV SURFACE AB SERPL IA-ACNC: REACTIVE M[IU]/ML
MICROALBUMIN UR-MCNC: 45 MG/L
MICROALBUMIN/CREAT UR: 21.03 MG/G CR (ref 0–25)

## 2024-11-26 PROCEDURE — 86706 HEP B SURFACE ANTIBODY: CPT

## 2024-11-26 PROCEDURE — 99214 OFFICE O/P EST MOD 30 MIN: CPT | Performed by: INTERNAL MEDICINE

## 2024-11-26 PROCEDURE — G2211 COMPLEX E/M VISIT ADD ON: HCPCS | Performed by: INTERNAL MEDICINE

## 2024-11-26 PROCEDURE — 83036 HEMOGLOBIN GLYCOSYLATED A1C: CPT

## 2024-11-26 PROCEDURE — 82043 UR ALBUMIN QUANTITATIVE: CPT

## 2024-11-26 PROCEDURE — 36415 COLL VENOUS BLD VENIPUNCTURE: CPT

## 2024-11-26 PROCEDURE — 86704 HEP B CORE ANTIBODY TOTAL: CPT

## 2024-11-26 PROCEDURE — G0463 HOSPITAL OUTPT CLINIC VISIT: HCPCS | Performed by: INTERNAL MEDICINE

## 2024-11-26 PROCEDURE — 82570 ASSAY OF URINE CREATININE: CPT

## 2024-11-26 ASSESSMENT — PAIN SCALES - GENERAL: PAINLEVEL_OUTOF10: MILD PAIN (3)

## 2024-11-26 NOTE — LETTER
11/26/2024       RE: Nena Tang  [address] [facility] [address]     Dear Colleague,    Thank you for referring your patient, Nena Tang, to the [facility] RHEUMATOLOGY at [facility]. Please see a copy of my visit note below.                           Chief Complaint/Reason for Visit: pos AIDA.     HPI:    Nena Tang is a 63 year old female with past medical history listed below. She denied having joint pain or swelling. She continues to be on leflunomide 20 mg daily. She is c/o pain of left calf muscle that started several weeks ago.    REVIEW OF SYSTEMS    General - neg for fever or chills  HEENT - neg for dry mouth and skin, neg for dry eyes  Cardiovascular - neg for chest pain or sob.   Respiratory - neg for cough or sob   Gastrointestinal - neg for blood in stool  Genitourinary - neg for blood in urine   Skin - neg for skin rashes       Past Medical History:   Diagnosis Date     Acute respiratory failure with hypoxia (H) 09/04/2017     CAD (coronary artery disease)     5/2014 cath, nonbostructive stenosis to LAD, RCA.     Chronic low back pain 01/22/2013     Cocaine abuse, in remission (H)      Fecal urgency 03/08/2012     History of heroin abuse (H)      Hyperlipidemia LDL goal <100 10/31/2010     Hypertension 07/29/2013     Illiterate 08/30/2011     Irritable bowel syndrome      Left cataract      Migraine 04/19/2012     Migraine headache 04/22/2013     Moderate major depression (H) 06/08/2011     Noncompliance with medication regimen 06/08/2011     Obesity      CINDY (obstructive sleep apnea) 03/08/2012    uses CPAP     CINDY (obstructive sleep apnea)- mild AHI 10.3      Osteopenia 10/07/2009     Pneumonia      Pneumonia of right lower lobe due to infectious organism 09/04/2017     Schizoaffective disorder, depressive type (H) 02/25/2013     Sepsis (H) 08/29/2017     Suicidal intent 10/02/2013     Takotsubo cardiomyopathy      Type  2 diabetes mellitus (H) 08/30/2011     Uterine cancer (H) 1983     Verbal auditory hallucination 10/04/2012     Past Surgical History:   Procedure Laterality Date     CATARACT IOL, RT/LT Bilateral 2017     CHOLECYSTECTOMY       COLONOSCOPY N/A 3/16/2017    Procedure: COLONOSCOPY;  Surgeon: Traci Gonzalez MD;  Location:  GI     Coronary CTA  5/21/2014     HYSTERECTOMY  1983    uterine cancer yearly pap's per provider.     HYSTERECTOMY       LAPAROSCOPIC CHOLECYSTECTOMY  2008     PHACOEMULSIFICATION CLEAR CORNEA WITH STANDARD INTRAOCULAR LENS IMPLANT Left 5/5/2017    Procedure: PHACOEMULSIFICATION CLEAR CORNEA WITH STANDARD INTRAOCULAR LENS IMPLANT;  LEFT EYE PHACOEMULSIFICATION CLEAR CORNEA WITH STANDARD INTRAOCULAR LENS IMPLANT ;  Surgeon: Tyra Diaz MD;  Location:  EC     PHACOEMULSIFICATION CLEAR CORNEA WITH STANDARD INTRAOCULAR LENS IMPLANT Right 6/30/2017    Procedure: PHACOEMULSIFICATION CLEAR CORNEA WITH STANDARD INTRAOCULAR LENS IMPLANT;  RIGHT EYE PHACOEMULSIFICATION CLEAR CORNEA WITH STANDARD INTRAOCULAR LENS IMPLANT;  Surgeon: Tyra Diaz MD;  Location:  EC     RELEASE TRIGGER FINGER  10/11/2012    Left thumb. Procedure: RELEASE TRIGGER FINGER;  LEFT THUMB TRIGGER RELEASE;  Surgeon: Tay Langley MD;  Location:  SD     RELEASE TRIGGER FINGER Right 12/26/2016    Procedure: RELEASE TRIGGER FINGER;  Surgeon: Albino Castañeda MD;  Location:  OR     [facility] OOPHORECTORMY FOR MALIG, W/BX  1983    UTERINE     Family History   Problem Relation Age of Onset     Cancer Mother         BLADDER     Respiratory Mother         COPD     Gastrointestinal Disease Mother         CIRRHOSIS OF LI BOLIVAR     Alcohol/Drug Mother      Diabetes Mother      Hypertension Mother      Lipids Mother      C.A.D. Mother      Glaucoma Mother      Alcohol/Drug Sister      Mental Illness Sister      Alcohol/Drug Sister      Psychotic Disorder Sister      Cancer Maternal Grandmother         UNKNOWN TYPE      Cancer Brother         COLON     Cancer - colorectal Brother         IN HIS LATE 30S     Alcohol/Drug Brother          OF HEROIN OVERDOSE AT AGE 22 YRS     Macular Degeneration No family hx of      Social History     Socioeconomic History     Marital status:      Spouse name: None     Number of children: 2     Years of education: None     Highest education level: None   Tobacco Use     Smoking status: Never     Smokeless tobacco: Never   Vaping Use     Vaping Use: Never used   Substance and Sexual Activity     Alcohol use: Not Currently     Comment: when younger     Drug use: No     Comment: history of     Sexual activity: Not Currently     Partners: Male     Birth control/protection: None, Condom   Other Topics Concern      Service No     Blood Transfusions No     Caffeine Concern No     Occupational Exposure No     Hobby Hazards No     Sleep Concern Yes     Stress Concern Yes     Weight Concern Yes     Special Diet Yes     Comment: DM     Back Care Yes     Exercise Yes     Comment: WALKS DAILY     Seat Belt Yes     Self-Exams No     Comment: ENCOURAGED   Social History Narrative     10/2014. Has 2 sons. 7 grandchildren.         Unemployed. Graduated HS.         Tobacco use: Denies    Alcohol use: Escalated use since   10/2014    Drug: Denies     Social Determinants of Health     Financial Resource Strain: Low Risk  (10/3/2023)    Financial Resource Strain      Within the past 12 months, have you or your family members you live with been unable to get utilities (heat, electricity) when it was really needed?: No   Food Insecurity: Low Risk  (10/3/2023)    Food Insecurity      Within the past 12 months, did you worry that your food would run out before you got money to buy more?: No      Within the past 12 months, did the food you bought just not last and you didn t have money to get more?: No   Transportation Needs: Low Risk  (10/3/2023)    Transportation Needs      Within  the past 12 months, has lack of transportation kept you from medical appointments, getting your medicines, non-medical meetings or appointments, work, or from getting things that you need?: No   Interpersonal Safety: Not At Risk (8/25/2022)    Humiliation, Afraid, Rape, and Kick questionnaire      Fear of Current or Ex-Partner: No      Emotionally Abused: No      Physically Abused: No      Sexually Abused: No   Housing Stability: Low Risk  (10/3/2023)    Housing Stability      Do you have housing? : Yes      Are you worried about losing your housing?: No       Allergies   Allergen Reactions     Imidazole Antifungals Hives     Tolerates diflucan     Ketoprofen Itching     Pruritis to topical     Lisinopril Hives     Metformin Other (See Comments)     Patient hospitalized for lactic acidosis - admitting provider suspectd caused by metformin     Metronidazole Hives     Penicillins      Posaconazole Hives     Tolerates diflucan       Current Outpatient Medications   Medication Sig Dispense Refill     acetaminophen (TYLENOL) 500 MG tablet TAKE 2 TABLETS BY MOUTH THREE TIMES A  tablet 4     albuterol (PROAIR HFA/PROVENTIL HFA/VENTOLIN HFA) 108 (90 Base) MCG/ACT inhaler Inhale 2 puffs into the lungs every 6 hours as needed for shortness of breath, wheezing or cough 18 g 3     Alcohol Swabs (ALCOHOL PREP) 70 % PADS APPLY 1 UNITS TOPICALLY 8 TIMES DAILY 200 each 3     alendronate (FOSAMAX) 70 MG tablet TAKE 1 TABLET BY MOUTH EVERY 7 DAYS 4 tablet 11     amantadine (SYMMETREL) 100 MG capsule TAKE 1 CAPSULE BY MOUTH DAILY 28 capsule 11     amLODIPine (NORVASC) 10 MG tablet Take 1 tablet (10 mg) by mouth every morning 30 tablet 11     ASPIRIN LOW DOSE 81 MG EC tablet TAKE 1 TABLET BY MOUTH DAILY 28 tablet 11     atorvastatin (LIPITOR) 80 MG tablet TAKE 1 TABLET BY MOUTH DAILY 90 tablet 2     blood glucose (NO BRAND SPECIFIED) test strip Use to test blood sugar 10 times daily or as directed. 100 strip 11     buPROPion  (WELLBUTRIN XL) 150 MG 24 hr tablet Take 1 tablet (150 mg) by mouth every morning. 30 tablet 11     calcium carbonate (OS-ALELN) 1500 (600 Ca) MG tablet TAKE 1 TABLET BY MOUTH DAILY 90 tablet 2     calcium polycarbophil (FIBERCON) 625 MG tablet Take 2 tablets (1,250 mg) by mouth twice a week. Take (Wednesday) with Ozempic and (Thursday) day after Ozempic.  Discontinue if diarrhea WORSE.  Consoder also giving Tuesday, day before if reduces loose stool, but not resolved. 30 tablet 3     chlorhexidine (PERIDEX) 0.12 % solution Swish and spit 10 mLs in mouth 2 times daily 1893 mL 3     clonazePAM (KLONOPIN) 0.5 MG tablet Take 1 tablet (0.5 mg) by mouth at bedtime. 28 tablet 5     Continuous Glucose Sensor (DEXCOM G6 SENSOR) MISC Change every 10 days. 9 each 4     Continuous Glucose Transmitter (DEXCOM G6 TRANSMITTER) MISC Change every 3 months. 1 each 4     diclofenac (VOLTAREN) 1 % topical gel APPLY 4 GRAMS TO PAINFUL AREA AT BEDTIME . MAY USE AN ADDITIONAL 3 DOSES DURING 100 g 1     gabapentin (NEURONTIN) 300 MG capsule TAKE 1 CAPSULE BY MOUTH AT BEDTIME 28 capsule 11     HUMALOG KWIKPEN 100 UNIT/ML soln Inject 0-12 Units subcutaneously 4 times daily (with meals and nightly) Before meals: BG 81 - 150:  0 units 151 - 200: 2 units, 201 - 250: 4 units, 251-300: 6 units, 301 - 350: 8 units, 351 or greater : 10 units At BEDTIME-If >4 hours since last Humalog injection For  - 249 give 1 units, 250 - 299 give 2 units,300 - 349 give 3 units, = or > 350 give 4 units. 15 mL 1     hydrOXYzine (VISTARIL) 25 MG capsule TAKE 1 CAPSULE BY MOUTH EVERY NIGHT AT BEDTIME ANXIETY/SLEEP 28 capsule 0     ibuprofen (ADVIL/MOTRIN) 200 MG tablet Take 200 mg by mouth every 4 hours as needed for pain       insulin glargine (LANTUS SOLOSTAR) 100 UNIT/ML pen Inject 22 Units subcutaneously daily. 45 mL 1     insulin pen needle (BD AUTOSHIELD DUO) 30G X 5 MM USE 6 DAILY OR AS DIRECTED 200 each 10     Lancets (ONETOUCH DELICA PLUS OVNEVQ17E)  MISC 1 EACH AS NEEDED  USE TO TEST BLOOD SUGARS 1-2 TIMES DAILY AS DIRECTED 100 each 1     leflunomide (ARAVA) 20 MG tablet Take 1 tablet (20 mg) by mouth daily Labs every 8-12 weeks - Oral 30 tablet 0     Lidocaine (LIDOCAINE PAIN RELIEF) 4 % Patch APPLY 1 PATCH ONTO SKIN EVERY 24 HOURS ; APPLY AT NIGHT AND REMOVE IN THE MORNING ( TWELVE HOURS PATCH FREE) 30 patch 11     loperamide (IMODIUM) 2 MG capsule TAKE 1 CAPSULE BY MOUTH FOUR TIMES A DAY AS NEEDED FOR DIARRHEA 30 capsule 1     losartan (COZAAR) 100 MG tablet TAKE 1 TABLET BY MOUTH DAILY 28 tablet 11     Magnesium Oxide 250 MG TABS TAKE 1 TABLET BY MOUTH AT BEDTIME 28 tablet 11     melatonin 5 MG tablet Take 5 mg by mouth at bedtime       metoprolol succinate ER (TOPROL XL) 50 MG 24 hr tablet TAKE 1 TABLET BY MOUTH IN THE MORNING AND TAKE 2 TABLETS AT BEDTIME 84 tablet 11     mirabegron (MYRBETRIQ) 50 MG 24 hr tablet Take 1 tablet (50 mg) by mouth daily. 90 tablet 5     mirabegron (MYRBETRIQ) 50 MG 24 hr tablet TAKE 1 TABLET BY MOUTH DAILY 28 tablet 1     mupirocin (BACTROBAN) 2 % external ointment Apply topically 2 times daily 22 g 0     naproxen (NAPROSYN) 375 MG tablet Take 1 tablet (375 mg) by mouth 2 times daily as needed for moderate pain (denatl pain) 60 tablet 11     NYSTOP 948669 UNIT/GM powder APPLY TOPICALLY TWICE A DAY AS NEEDED 60 g 0     ondansetron (ZOFRAN) 4 MG tablet TAKE 1 TABLET BY MOUTH EVERY 8 HOURS AS NEEDED FOR NAUSEA 10 tablet 1     order for DME Equipment being ordered: Depends. 30 each 4     order for DME Equipment being ordered: CPAP Supplies. 1 each 0     paliperidone ER (INVEGA) 6 MG 24 hr tablet TAKE 2 TABLETS BY MOUTH EVERY NIGHT AT BEDTIME 60 tablet 11     pantoprazole (PROTONIX) 40 MG EC tablet TAKE 1 TABLET (40 MG) BY MOUTH DAILY 28 tablet 11     prazosin (MINIPRESS) 1 MG capsule Take 1 mg by mouth at bedtime       QUEtiapine (SEROQUEL) 100 MG tablet Take 100 mg by mouth at bedtime       senna-docusate (SENOKOT S) 8.6-50 MG  tablet TAKE 1 TABLET BY MOUTH DAILY AND EXTRA 1 TABLET AS NEEDED FOR CONSTIPATION 28 tablet 3     senna-docusate (SENOKOT-S/PERICOLACE) 8.6-50 MG tablet Take 1 tablet by mouth daily       sertraline (ZOLOFT) 100 MG tablet Take 100 mg by mouth daily       Tirzepatide (MOUNJARO) 7.5 MG/0.5ML SOAJ INJECT 7.5MG SUBCUTANEOUSLY ONCE A WEEK 2 mL 0     Tirzepatide 7.5 MG/0.5ML SOAJ Inject 0.5 mLs (7.5 mg) subcutaneously every 7 days. 2 mL 11     traZODone (DESYREL) 100 MG tablet TAKE 1 TABLET BY MOUTH AT BEDTIME 28 tablet 11     vitamin C (ASCORBIC ACID) 1000 MG TABS Take 1 tablet (1,000 mg) by mouth 2 times daily. 180 tablet 1     vitamin C (ASCORBIC ACID) 500 MG tablet TAKE 2 TABLETS BY MOUTH IN THE MORNING AND TAKE 2 TABLETS BY MOUTH IN THE EVENING 112 tablet 1     Vitamin D, Cholecalciferol, 25 MCG (1000 UT) CAPS Take 2 capsules by mouth daily. 60 capsule 3     zinc oxide (DESITIN) 20 % external ointment Apply topically 3 times daily as needed for irritation (barrier cream) 60 g 3     Current Facility-Administered Medications   Medication Dose Route Frequency Provider Last Rate Last Admin     apraclonidine (IOPIDINE) 1 % ophthalmic solution 1 drop  1 drop Both Eyes Once Tiburcio Chacon MD         lidocaine 1 % injection 4 mL  4 mL   Luiz Hernandez DO   4 mL at 06/22/23 1400     triamcinolone (KENALOG-40) injection 40 mg  40 mg   Luiz Hernandez DO   40 mg at 06/22/23 1400       PHYSICAL EXAM    BP (!) 149/82 (BP Location: Right arm, Patient Position: Sitting, Cuff Size: Adult Large)   Pulse 74   Wt 93.4 kg (206 lb)   LMP 01/06/2015 (Exact Date)   SpO2 96%   BMI 39.40 kg/m      General: Alert, No apparent distress  Psych: Affect euthymic  Eyes: Sclera noninjected  Skin: No rashes noted  Cardiovascular: Regular rate and rhythm, Normal S1 and S2 and No murmurs, rubs or gallops  Respiratory: Clear to auscultation with no wheezing or crackles  Neuro: Normal gait and Able to arise from seated position  unassisted  Musculoskeletal: Hands:  No synovitis.    GI - abdomen non tender   Wrists:  Normal.  Elbows:  Normal.  Shoulders:  Normal.  Feet:  Normal.  Left calf warmth and tenderness +  Knees:  Normal.    LABS   Reviewed as below.     Nov 2024  CBC - Hb low at 9.7, normal wbc and plt  CRP elevated at 7.69    Sept 2024  Vit D low at 17  CMP - LFT and creatinine normal    April 2024  Hep B surface ag neg     July and Sept 2023  CK, folate, vitamin B12, TSH, Vit D, LFT  normal   C4 - 42 and C3 161 (elevated)  Anti sm, mitochondrial,RNP, SCL 70, ds dna neg  Centromere pos   AIDA pos 1:320 centromere   RF 13  CCP neg     June 2023  ESR, CRP normal      Latest Reference Range & Units 07/13/16 13:50 09/06/18 12:01 04/02/24 12:03   Hep B Surface Agn Nonreactive    Nonreactive   Hepatitis C Antibody NR  Nonreactive   Assay performance characteristics have not been established for newborns,   infants, and children       HIV Antigen Antibody Combo NR^Nonreactive      Nonreactive          EXAM: CT CHEST PE ABDOMEN PELVIS W CONTRAST  LOCATION: [facility]  DATE: 8/17/2023     INDICATION: right sided flank pain, pain with breatihng. evaluate for PE, kidney stone  COMPARISON: CT chest 6/27/23. CT abdomen and pelvis 6/25/23.  TECHNIQUE: CT chest pulmonary angiogram and routine CT abdomen pelvis with IV contrast. Arterial phase through the chest and venous phase through the abdomen and pelvis. Multiplanar reformats and MIP reconstructions were performed. Dose reduction   techniques were used.   CONTRAST: 122 mL Isovue   370     FINDINGS:  ANGIOGRAM CHEST: No pulmonary embolus. No aortic dissection or aneurysm.     LUNGS AND PLEURA: Normal.     MEDIASTINUM/AXILLAE: Normal.     CORONARY ARTERY CALCIFICATION: Moderate.      HEPATOBILIARY: Cholecystectomy.     PANCREAS: Normal.     SPLEEN: Normal.     ADRENAL GLANDS: Normal.     KIDNEYS/BLADDER: Normal.     BOWEL: Normal. No obstruction or inflammation. Normal  appendix.     LYMPH NODES: Normal.     VASCULATURE: Mild to moderate aortoiliac atherosclerosis.     PELVIC ORGANS: Hysterectomy.     MUSCULOSKELETAL: Mild degenerative changes of the spine.                                                                      IMPRESSION:  No acute process in the chest, abdomen or pelvis.      KNEE BILATERAL THREE VIEWS  6/22/2023 2:01 PM      HISTORY: Bilateral knee pain.     COMPARISON: None.                                                                       IMPRESSION:  Mild patellofemoral compartment degenerative change on  both sides. There is no evidence of an acute fracture. Joint effusion  is not assessed due to obliquity of the lateral views. Atherosclerotic  vascular calcifications.     TTE  Interpretation Summary     Left ventricular systolic function is normal.  The visual ejection fraction is estimated at 60-65%.  Right ventricular systolic pressure is elevated, consistent with mild  pulmonary hypertension. This is new compared to 2014.  The study was technically difficult.       ASSESSMENT      1. Inflammatory arthritis    2. Positive AIDA (antinuclear antibody)    3. Centromere antibody positive    4. Low hemoglobin    5. Generalized OA    6. Rheumatoid factor positive    7. Pain of left calf          (R76.8) Positive AIDA (antinuclear antibody)  (primary encounter diagnosis)  (R76.8) Centromere antibody positive  Comment: AIDA pos 1:320 centromere.Anti sm, mitochondrial,RNP, SCL 70, ds dna neg. RF 13. CCP neg.Today she reports swelling of hands and feet associated with pain. ROS neg for raynaud's, difficulty swallowing, oral ulcers. She was noted to have puffiness of right and left 2nd-4th MCP tenderness. She was on leflunomide in the past which was later stopped by  as he thought her problems were secondary to mechanical causes.Cannot use HCQ due to diabetic retinopathy. CT chest in 2023 did not reveal features of ILD. Will avoid methotrexate due to underlying  CKD. Restarted leflunomide in Dec 2023.   Plan:   Continue leflunomide 20 mg daily.   Check labs on next visit.    Will check left LE duplex to r/o DVT.   Orders Placed This Encounter   Procedures     US Lower Extremity Venous Duplex Left       (D64.9) Low hemoglobin  Comment: noted  Plan: ref to heme     (M15.9) Generalized OA  Comment: OA of knees and hands   Plan: may use tylenol not to exceed 2 g in 24 hours as needed     Inflammatory arthritis   (R76.8) Rheumatoid factor positive  Comment: RF 13, CCP neg. She does have swelling of MCPs on exam which makes me think she does have an underlying inflammatory arthropathy.She was on leflunomide in the past which was later stopped by  as he thought her problems were secondary to mechanical causes.Cannot use HCQ due to diabetic retinopathy. CT chest in 2023 did not reveal features of ILD. Will avoid methotrexate due to underlying CKD.   Plan: as above     RTC - 12 weeks         ALEXUS DIOP MD    Division of Rheumatic & Autoimmune Diseases  [facility]       Again, thank you for allowing me to participate in the care of your patient.      Sincerely,    Alexus Diop MD

## 2024-11-26 NOTE — NURSING NOTE
Chief Complaint   Patient presents with    RECHECK     follow-up       BP (!) 149/82 (BP Location: Right arm, Patient Position: Sitting, Cuff Size: Adult Large)   Pulse 74   Wt 93.4 kg (206 lb)   LMP 01/06/2015 (Exact Date)   SpO2 96%   BMI 39.40 kg/m

## 2024-11-26 NOTE — PROGRESS NOTES
Chief Complaint/Reason for Visit: pos AIDA.     HPI:    Nena Tang is a 63 year old female with past medical history listed below. She denied having joint pain or swelling. She continues to be on leflunomide 20 mg daily. She is c/o pain of left calf muscle that started several weeks ago.    REVIEW OF SYSTEMS    General - neg for fever or chills  HEENT - neg for dry mouth and skin, neg for dry eyes  Cardiovascular - neg for chest pain or sob.   Respiratory - neg for cough or sob   Gastrointestinal - neg for blood in stool  Genitourinary - neg for blood in urine   Skin - neg for skin rashes       Past Medical History:   Diagnosis Date    Acute respiratory failure with hypoxia (H) 09/04/2017    CAD (coronary artery disease)     5/2014 cath, nonbostructive stenosis to LAD, RCA.    Chronic low back pain 01/22/2013    Cocaine abuse, in remission (H)     Fecal urgency 03/08/2012    History of heroin abuse (H)     Hyperlipidemia LDL goal <100 10/31/2010    Hypertension 07/29/2013    Illiterate 08/30/2011    Irritable bowel syndrome     Left cataract     Migraine 04/19/2012    Migraine headache 04/22/2013    Moderate major depression (H) 06/08/2011    Noncompliance with medication regimen 06/08/2011    Obesity     CINDY (obstructive sleep apnea) 03/08/2012    uses CPAP    CINDY (obstructive sleep apnea)- mild AHI 10.3     Osteopenia 10/07/2009    Pneumonia     Pneumonia of right lower lobe due to infectious organism 09/04/2017    Schizoaffective disorder, depressive type (H) 02/25/2013    Sepsis (H) 08/29/2017    Suicidal intent 10/02/2013    Takotsubo cardiomyopathy     Type 2 diabetes mellitus (H) 08/30/2011    Uterine cancer (H) 1983    Verbal auditory hallucination 10/04/2012     Past Surgical History:   Procedure Laterality Date    CATARACT IOL, RT/LT Bilateral 2017    CHOLECYSTECTOMY      COLONOSCOPY N/A 3/16/2017    Procedure: COLONOSCOPY;  Surgeon: Traci Gonzalez MD;  Location:  [facility]    Coronary CTA  2014    HYSTERECTOMY      uterine cancer yearly pap's per provider.    HYSTERECTOMY      LAPAROSCOPIC CHOLECYSTECTOMY      PHACOEMULSIFICATION CLEAR CORNEA WITH STANDARD INTRAOCULAR LENS IMPLANT Left 2017    Procedure: PHACOEMULSIFICATION CLEAR CORNEA WITH STANDARD INTRAOCULAR LENS IMPLANT;  LEFT EYE PHACOEMULSIFICATION CLEAR CORNEA WITH STANDARD INTRAOCULAR LENS IMPLANT ;  Surgeon: Tyra Diaz MD;  Location:  [facility]    PHACOEMULSIFICATION CLEAR CORNEA WITH STANDARD INTRAOCULAR LENS IMPLANT Right 2017    Procedure: PHACOEMULSIFICATION CLEAR CORNEA WITH STANDARD INTRAOCULAR LENS IMPLANT;  RIGHT EYE PHACOEMULSIFICATION CLEAR CORNEA WITH STANDARD INTRAOCULAR LENS IMPLANT;  Surgeon: Tyra Diaz MD;  Location:  [facility]    RELEASE TRIGGER FINGER  10/11/2012    Left thumb. Procedure: RELEASE TRIGGER FINGER;  LEFT THUMB TRIGGER RELEASE;  Surgeon: Tay Langley MD;  Location:  [facility]    RELEASE TRIGGER FINGER Right 2016    Procedure: RELEASE TRIGGER FINGER;  Surgeon: Albino Castañeda MD;  Location:  [facility]    ZZC OOPHORECTORMY FOR MALIG, W/BX      UTERINE     Family History   Problem Relation Age of Onset    Cancer Mother         BLADDER    Respiratory Mother         COPD    Gastrointestinal Disease Mother         CIRRHOSIS OF LI BOLIVAR    Alcohol/Drug Mother     Diabetes Mother     Hypertension Mother     Lipids Mother     C.A.D. Mother     Glaucoma Mother     Alcohol/Drug Sister     Mental Illness Sister     Alcohol/Drug Sister     Psychotic Disorder Sister     Cancer Maternal Grandmother         UNKNOWN TYPE    Cancer Brother         COLON    Cancer - colorectal Brother         IN HIS LATE 30S    Alcohol/Drug Brother          OF HEROIN OVERDOSE AT AGE 22 YRS    Macular Degeneration No family hx of      Social History     Socioeconomic History    Marital status:      Spouse name: None    Number of children: 2    Years of education: None    Highest  education level: None   Tobacco Use    Smoking status: Never    Smokeless tobacco: Never   Vaping Use    Vaping Use: Never used   Substance and Sexual Activity    Alcohol use: Not Currently     Comment: when younger    Drug use: No     Comment: history of    Sexual activity: Not Currently     Partners: Male     Birth control/protection: None, Condom   Other Topics Concern     Service No    Blood Transfusions No    Caffeine Concern No    Occupational Exposure No    Hobby Hazards No    Sleep Concern Yes    Stress Concern Yes    Weight Concern Yes    Special Diet Yes     Comment: DM    Back Care Yes    Exercise Yes     Comment: WALKS DAILY    Seat Belt Yes    Self-Exams No     Comment: ENCOURAGED   Social History Narrative     10/2014. Has 2 sons. 7 grandchildren.         Unemployed. Graduated HS.         Tobacco use: Denies    Alcohol use: Escalated use since   10/2014    Drug: Denies     Social Determinants of Health     Financial Resource Strain: Low Risk  (10/3/2023)    Financial Resource Strain     Within the past 12 months, have you or your family members you live with been unable to get utilities (heat, electricity) when it was really needed?: No   Food Insecurity: Low Risk  (10/3/2023)    Food Insecurity     Within the past 12 months, did you worry that your food would run out before you got money to buy more?: No     Within the past 12 months, did the food you bought just not last and you didn t have money to get more?: No   Transportation Needs: Low Risk  (10/3/2023)    Transportation Needs     Within the past 12 months, has lack of transportation kept you from medical appointments, getting your medicines, non-medical meetings or appointments, work, or from getting things that you need?: No   Interpersonal Safety: Not At Risk (2022)    Humiliation, Afraid, Rape, and Kick questionnaire     Fear of Current or Ex-Partner: No     Emotionally Abused: No     Physically Abused: No      Sexually Abused: No   Housing Stability: Low Risk  (10/3/2023)    Housing Stability     Do you have housing? : Yes     Are you worried about losing your housing?: No       Allergies   Allergen Reactions    Imidazole Antifungals Hives     Tolerates diflucan    Ketoprofen Itching     Pruritis to topical    Lisinopril Hives    Metformin Other (See Comments)     Patient hospitalized for lactic acidosis - admitting provider suspectd caused by metformin    Metronidazole Hives    Penicillins     Posaconazole Hives     Tolerates diflucan       Current Outpatient Medications   Medication Sig Dispense Refill    acetaminophen (TYLENOL) 500 MG tablet TAKE 2 TABLETS BY MOUTH THREE TIMES A  tablet 4    albuterol (PROAIR HFA/PROVENTIL HFA/VENTOLIN HFA) 108 (90 Base) MCG/ACT inhaler Inhale 2 puffs into the lungs every 6 hours as needed for shortness of breath, wheezing or cough 18 g 3    Alcohol Swabs (ALCOHOL PREP) 70 % PADS APPLY 1 UNITS TOPICALLY 8 TIMES DAILY 200 each 3    alendronate (FOSAMAX) 70 MG tablet TAKE 1 TABLET BY MOUTH EVERY 7 DAYS 4 tablet 11    amantadine (SYMMETREL) 100 MG capsule TAKE 1 CAPSULE BY MOUTH DAILY 28 capsule 11    amLODIPine (NORVASC) 10 MG tablet Take 1 tablet (10 mg) by mouth every morning 30 tablet 11    ASPIRIN LOW DOSE 81 MG EC tablet TAKE 1 TABLET BY MOUTH DAILY 28 tablet 11    atorvastatin (LIPITOR) 80 MG tablet TAKE 1 TABLET BY MOUTH DAILY 90 tablet 2    blood glucose (NO BRAND SPECIFIED) test strip Use to test blood sugar 10 times daily or as directed. 100 strip 11    buPROPion (WELLBUTRIN XL) 150 MG 24 hr tablet Take 1 tablet (150 mg) by mouth every morning. 30 tablet 11    calcium carbonate (OS-ALLEN) 1500 (600 Ca) MG tablet TAKE 1 TABLET BY MOUTH DAILY 90 tablet 2    calcium polycarbophil (FIBERCON) 625 MG tablet Take 2 tablets (1,250 mg) by mouth twice a week. Take (Wednesday) with Ozempic and (Thursday) day after Ozempic.  Discontinue if diarrhea WORSE.  Consoder also giving  Tuesday, day before if reduces loose stool, but not resolved. 30 tablet 3    chlorhexidine (PERIDEX) 0.12 % solution Swish and spit 10 mLs in mouth 2 times daily 1893 mL 3    clonazePAM (KLONOPIN) 0.5 MG tablet Take 1 tablet (0.5 mg) by mouth at bedtime. 28 tablet 5    Continuous Glucose Sensor (DEXCOM G6 SENSOR) MISC Change every 10 days. 9 each 4    Continuous Glucose Transmitter (DEXCOM G6 TRANSMITTER) MISC Change every 3 months. 1 each 4    diclofenac (VOLTAREN) 1 % topical gel APPLY 4 GRAMS TO PAINFUL AREA AT BEDTIME . MAY USE AN ADDITIONAL 3 DOSES DURING 100 g 1    gabapentin (NEURONTIN) 300 MG capsule TAKE 1 CAPSULE BY MOUTH AT BEDTIME 28 capsule 11    HUMALOG KWIKPEN 100 UNIT/ML soln Inject 0-12 Units subcutaneously 4 times daily (with meals and nightly) Before meals: BG 81 - 150:  0 units 151 - 200: 2 units, 201 - 250: 4 units, 251-300: 6 units, 301 - 350: 8 units, 351 or greater : 10 units At BEDTIME-If >4 hours since last Humalog injection For  - 249 give 1 units, 250 - 299 give 2 units,300 - 349 give 3 units, = or > 350 give 4 units. 15 mL 1    hydrOXYzine (VISTARIL) 25 MG capsule TAKE 1 CAPSULE BY MOUTH EVERY NIGHT AT BEDTIME ANXIETY/SLEEP 28 capsule 0    ibuprofen (ADVIL/MOTRIN) 200 MG tablet Take 200 mg by mouth every 4 hours as needed for pain      insulin glargine (LANTUS SOLOSTAR) 100 UNIT/ML pen Inject 22 Units subcutaneously daily. 45 mL 1    insulin pen needle (BD AUTOSHIELD DUO) 30G X 5 MM USE 6 DAILY OR AS DIRECTED 200 each 10    Lancets (ONETOUCH DELICA PLUS [id number]) MISC 1 EACH AS NEEDED  USE TO TEST BLOOD SUGARS 1-2 TIMES DAILY AS DIRECTED 100 each 1    leflunomide (ARAVA) 20 MG tablet Take 1 tablet (20 mg) by mouth daily Labs every 8-12 weeks - Oral 30 tablet 0    Lidocaine (LIDOCAINE PAIN RELIEF) 4 % Patch APPLY 1 PATCH ONTO SKIN EVERY 24 HOURS ; APPLY AT NIGHT AND REMOVE IN THE MORNING ( TWELVE HOURS PATCH FREE) 30 patch 11    loperamide (IMODIUM) 2 MG capsule TAKE 1 CAPSULE BY  MOUTH FOUR TIMES A DAY AS NEEDED FOR DIARRHEA 30 capsule 1    losartan (COZAAR) 100 MG tablet TAKE 1 TABLET BY MOUTH DAILY 28 tablet 11    Magnesium Oxide 250 MG TABS TAKE 1 TABLET BY MOUTH AT BEDTIME 28 tablet 11    melatonin 5 MG tablet Take 5 mg by mouth at bedtime      metoprolol succinate ER (TOPROL XL) 50 MG 24 hr tablet TAKE 1 TABLET BY MOUTH IN THE MORNING AND TAKE 2 TABLETS AT BEDTIME 84 tablet 11    mirabegron (MYRBETRIQ) 50 MG 24 hr tablet Take 1 tablet (50 mg) by mouth daily. 90 tablet 5    mirabegron (MYRBETRIQ) 50 MG 24 hr tablet TAKE 1 TABLET BY MOUTH DAILY 28 tablet 1    mupirocin (BACTROBAN) 2 % external ointment Apply topically 2 times daily 22 g 0    naproxen (NAPROSYN) 375 MG tablet Take 1 tablet (375 mg) by mouth 2 times daily as needed for moderate pain (denatl pain) 60 tablet 11    NYSTOP [id number] UNIT/GM powder APPLY TOPICALLY TWICE A DAY AS NEEDED 60 g 0    ondansetron (ZOFRAN) 4 MG tablet TAKE 1 TABLET BY MOUTH EVERY 8 HOURS AS NEEDED FOR NAUSEA 10 tablet 1    order for DME Equipment being ordered: Depends. 30 each 4    order for DME Equipment being ordered: CPAP Supplies. 1 each 0    paliperidone ER (INVEGA) 6 MG 24 hr tablet TAKE 2 TABLETS BY MOUTH EVERY NIGHT AT BEDTIME 60 tablet 11    pantoprazole (PROTONIX) 40 MG EC tablet TAKE 1 TABLET (40 MG) BY MOUTH DAILY 28 tablet 11    prazosin (MINIPRESS) 1 MG capsule Take 1 mg by mouth at bedtime      QUEtiapine (SEROQUEL) 100 MG tablet Take 100 mg by mouth at bedtime      senna-docusate (SENOKOT S) 8.6-50 MG tablet TAKE 1 TABLET BY MOUTH DAILY AND EXTRA 1 TABLET AS NEEDED FOR CONSTIPATION 28 tablet 3    senna-docusate (SENOKOT-S/PERICOLACE) 8.6-50 MG tablet Take 1 tablet by mouth daily      sertraline (ZOLOFT) 100 MG tablet Take 100 mg by mouth daily      Tirzepatide (MOUNJARO) 7.5 MG/0.5ML SOAJ INJECT 7.5MG SUBCUTANEOUSLY ONCE A WEEK 2 mL 0    Tirzepatide 7.5 MG/0.5ML SOAJ Inject 0.5 mLs (7.5 mg) subcutaneously every 7 days. 2 mL 11     traZODone (DESYREL) 100 MG tablet TAKE 1 TABLET BY MOUTH AT BEDTIME 28 tablet 11    vitamin C (ASCORBIC ACID) 1000 MG TABS Take 1 tablet (1,000 mg) by mouth 2 times daily. 180 tablet 1    vitamin C (ASCORBIC ACID) 500 MG tablet TAKE 2 TABLETS BY MOUTH IN THE MORNING AND TAKE 2 TABLETS BY MOUTH IN THE EVENING 112 tablet 1    Vitamin D, Cholecalciferol, 25 MCG (1000 UT) CAPS Take 2 capsules by mouth daily. 60 capsule 3    zinc oxide (DESITIN) 20 % external ointment Apply topically 3 times daily as needed for irritation (barrier cream) 60 g 3     Current Facility-Administered Medications   Medication Dose Route Frequency Provider Last Rate Last Admin    apraclonidine (IOPIDINE) 1 % ophthalmic solution 1 drop  1 drop Both Eyes Once Tiburcio Chacon MD        lidocaine 1 % injection 4 mL  4 mL   Luiz Hernandez DO   4 mL at 06/22/23 1400    triamcinolone (KENALOG-40) injection 40 mg  40 mg   Luiz Hernandez DO   40 mg at 06/22/23 1400       PHYSICAL EXAM    BP (!) 149/82 (BP Location: Right arm, Patient Position: Sitting, Cuff Size: Adult Large)   Pulse 74   Wt 93.4 kg (206 lb)   LMP 01/06/2015 (Exact Date)   SpO2 96%   BMI 39.40 kg/m      General: Alert, No apparent distress  Psych: Affect euthymic  Eyes: Sclera noninjected  Skin: No rashes noted  Cardiovascular: Regular rate and rhythm, Normal S1 and S2 and No murmurs, rubs or gallops  Respiratory: Clear to auscultation with no wheezing or crackles  Neuro: Normal gait and Able to arise from seated position unassisted  Musculoskeletal: Hands:  No synovitis.    GI - abdomen non tender   Wrists:  Normal.  Elbows:  Normal.  Shoulders:  Normal.  Feet:  Normal.  Left calf warmth and tenderness +  Knees:  Normal.    LABS   Reviewed as below.     Nov 2024  CBC - Hb low at 9.7, normal wbc and plt  CRP elevated at 7.69    Sept 2024  Vit D low at 17  CMP - LFT and creatinine normal    April 2024  Hep B surface ag neg     July and Sept 2023  CK, folate, vitamin  B12, TSH, Vit D, LFT  normal   C4 - 42 and C3 161 (elevated)  Anti sm, mitochondrial,RNP, SCL 70, ds dna neg  Centromere pos   AIDA pos 1:320 centromere   RF 13  CCP neg     June 2023  ESR, CRP normal      Latest Reference Range & Units 07/13/16 13:50 09/06/18 12:01 04/02/24 12:03   Hep B Surface Agn Nonreactive    Nonreactive   Hepatitis C Antibody NR  Nonreactive   Assay performance characteristics have not been established for newborns,   infants, and children       HIV Antigen Antibody Combo NR^Nonreactive      Nonreactive          EXAM: CT CHEST PE ABDOMEN PELVIS W CONTRAST  LOCATION: [facility]  DATE: 8/17/2023     INDICATION: right sided flank pain, pain with breatihng. evaluate for PE, kidney stone  COMPARISON: CT chest 6/27/23. CT abdomen and pelvis 6/25/23.  TECHNIQUE: CT chest pulmonary angiogram and routine CT abdomen pelvis with IV contrast. Arterial phase through the chest and venous phase through the abdomen and pelvis. Multiplanar reformats and MIP reconstructions were performed. Dose reduction   techniques were used.   CONTRAST: 122 mL Isovue   370     FINDINGS:  ANGIOGRAM CHEST: No pulmonary embolus. No aortic dissection or aneurysm.     LUNGS AND PLEURA: Normal.     MEDIASTINUM/AXILLAE: Normal.     CORONARY ARTERY CALCIFICATION: Moderate.      HEPATOBILIARY: Cholecystectomy.     PANCREAS: Normal.     SPLEEN: Normal.     ADRENAL GLANDS: Normal.     KIDNEYS/BLADDER: Normal.     BOWEL: Normal. No obstruction or inflammation. Normal appendix.     LYMPH NODES: Normal.     VASCULATURE: Mild to moderate aortoiliac atherosclerosis.     PELVIC ORGANS: Hysterectomy.     MUSCULOSKELETAL: Mild degenerative changes of the spine.                                                                      IMPRESSION:  No acute process in the chest, abdomen or pelvis.      KNEE BILATERAL THREE VIEWS  6/22/2023 2:01 PM      HISTORY: Bilateral knee pain.     COMPARISON: None.                                                                        IMPRESSION:  Mild patellofemoral compartment degenerative change on  both sides. There is no evidence of an acute fracture. Joint effusion  is not assessed due to obliquity of the lateral views. Atherosclerotic  vascular calcifications.     TTE  Interpretation Summary     Left ventricular systolic function is normal.  The visual ejection fraction is estimated at 60-65%.  Right ventricular systolic pressure is elevated, consistent with mild  pulmonary hypertension. This is new compared to 2014.  The study was technically difficult.       ASSESSMENT      1. Inflammatory arthritis    2. Positive AIDA (antinuclear antibody)    3. Centromere antibody positive    4. Low hemoglobin    5. Generalized OA    6. Rheumatoid factor positive    7. Pain of left calf          (R76.8) Positive AIDA (antinuclear antibody)  (primary encounter diagnosis)  (R76.8) Centromere antibody positive  Comment: AIDA pos 1:320 centromere.Anti sm, mitochondrial,RNP, SCL 70, ds dna neg. RF 13. CCP neg.Today she reports swelling of hands and feet associated with pain. ROS neg for raynaud's, difficulty swallowing, oral ulcers. She was noted to have puffiness of right and left 2nd-4th MCP tenderness. She was on leflunomide in the past which was later stopped by  as he thought her problems were secondary to mechanical causes.Cannot use HCQ due to diabetic retinopathy. CT chest in 2023 did not reveal features of ILD. Will avoid methotrexate due to underlying CKD. Restarted leflunomide in Dec 2023.   Plan:   Continue leflunomide 20 mg daily.   Check labs on next visit.    Will check left LE duplex to r/o DVT.   Orders Placed This Encounter   Procedures    US Lower Extremity Venous Duplex Left       (D64.9) Low hemoglobin  Comment: noted  Plan: ref to heme     (M15.9) Generalized OA  Comment: OA of knees and hands   Plan: may use tylenol not to exceed 2 g in 24 hours as needed     Inflammatory arthritis    (R76.8) Rheumatoid factor positive  Comment: RF 13, CCP neg. She does have swelling of MCPs on exam which makes me think she does have an underlying inflammatory arthropathy.She was on leflunomide in the past which was later stopped by  as he thought her problems were secondary to mechanical causes.Cannot use HCQ due to diabetic retinopathy. CT chest in 2023 did not reveal features of ILD. Will avoid methotrexate due to underlying CKD.   Plan: as above     RTC - 12 weeks         JEWEL DOIP MD    Division of Rheumatic & Autoimmune Diseases  [facility]

## 2024-12-03 ENCOUNTER — HOSPITAL ENCOUNTER (OUTPATIENT)
Dept: ULTRASOUND IMAGING | Facility: CLINIC | Age: 64
Discharge: HOME | End: 2024-12-03
Attending: INTERNAL MEDICINE
Payer: COMMERCIAL

## 2024-12-03 DIAGNOSIS — M79.662 PAIN OF LEFT CALF: ICD-10-CM

## 2024-12-03 DIAGNOSIS — M25.50 POLYARTHRALGIA: ICD-10-CM

## 2024-12-03 PROCEDURE — 93971 EXTREMITY STUDY: CPT | Mod: 26 | Performed by: RADIOLOGY

## 2024-12-03 PROCEDURE — 93971 EXTREMITY STUDY: CPT | Mod: LT

## 2024-12-10 ENCOUNTER — OFFICE VISIT (OUTPATIENT)
Dept: PHARMACY | Facility: CLINIC | Age: 64
End: 2024-12-10
Payer: COMMERCIAL

## 2024-12-10 ENCOUNTER — OFFICE VISIT (OUTPATIENT)
Dept: FAMILY MEDICINE | Facility: CLINIC | Age: 64
End: 2024-12-10
Attending: FAMILY MEDICINE
Payer: COMMERCIAL

## 2024-12-10 VITALS
HEART RATE: 72 BPM | RESPIRATION RATE: 12 BRPM | TEMPERATURE: 97.4 F | WEIGHT: 207 LBS | SYSTOLIC BLOOD PRESSURE: 110 MMHG | OXYGEN SATURATION: 95 % | DIASTOLIC BLOOD PRESSURE: 62 MMHG | HEIGHT: 61 IN | BODY MASS INDEX: 39.08 KG/M2

## 2024-12-10 DIAGNOSIS — N18.31 TYPE 2 DIABETES MELLITUS WITH STAGE 3A CHRONIC KIDNEY DISEASE, WITH LONG-TERM CURRENT USE OF INSULIN (H): ICD-10-CM

## 2024-12-10 DIAGNOSIS — I10 HTN, GOAL BELOW 140/90: ICD-10-CM

## 2024-12-10 DIAGNOSIS — D64.9 ANEMIA, UNSPECIFIED TYPE: ICD-10-CM

## 2024-12-10 DIAGNOSIS — E11.22 TYPE 2 DIABETES MELLITUS WITH STAGE 3 CHRONIC KIDNEY DISEASE, WITH LONG-TERM CURRENT USE OF INSULIN, UNSPECIFIED WHETHER STAGE 3A OR 3B CKD (H): Primary | ICD-10-CM

## 2024-12-10 DIAGNOSIS — Z79.4 TYPE 2 DIABETES MELLITUS WITH STAGE 3 CHRONIC KIDNEY DISEASE, WITH LONG-TERM CURRENT USE OF INSULIN, UNSPECIFIED WHETHER STAGE 3A OR 3B CKD (H): Primary | ICD-10-CM

## 2024-12-10 DIAGNOSIS — F25.1 SCHIZOAFFECTIVE DISORDER, DEPRESSIVE TYPE (H): ICD-10-CM

## 2024-12-10 DIAGNOSIS — E11.22 TYPE 2 DIABETES MELLITUS WITH STAGE 3A CHRONIC KIDNEY DISEASE, WITH LONG-TERM CURRENT USE OF INSULIN (H): ICD-10-CM

## 2024-12-10 DIAGNOSIS — N18.30 TYPE 2 DIABETES MELLITUS WITH STAGE 3 CHRONIC KIDNEY DISEASE, WITH LONG-TERM CURRENT USE OF INSULIN, UNSPECIFIED WHETHER STAGE 3A OR 3B CKD (H): Primary | ICD-10-CM

## 2024-12-10 DIAGNOSIS — F33.2 SEVERE EPISODE OF RECURRENT MAJOR DEPRESSIVE DISORDER, WITHOUT PSYCHOTIC FEATURES (H): Primary | ICD-10-CM

## 2024-12-10 DIAGNOSIS — Z79.4 TYPE 2 DIABETES MELLITUS WITH STAGE 3A CHRONIC KIDNEY DISEASE, WITH LONG-TERM CURRENT USE OF INSULIN (H): ICD-10-CM

## 2024-12-10 DIAGNOSIS — K59.00 CONSTIPATION, UNSPECIFIED CONSTIPATION TYPE: ICD-10-CM

## 2024-12-10 DIAGNOSIS — M81.0 OSTEOPOROSIS WITHOUT CURRENT PATHOLOGICAL FRACTURE, UNSPECIFIED OSTEOPOROSIS TYPE: ICD-10-CM

## 2024-12-10 PROCEDURE — 99606 MTMS BY PHARM EST 15 MIN: CPT | Performed by: PHARMACIST

## 2024-12-10 PROCEDURE — G2211 COMPLEX E/M VISIT ADD ON: HCPCS | Performed by: FAMILY MEDICINE

## 2024-12-10 PROCEDURE — 99214 OFFICE O/P EST MOD 30 MIN: CPT | Performed by: FAMILY MEDICINE

## 2024-12-10 PROCEDURE — 99607 MTMS BY PHARM ADDL 15 MIN: CPT | Performed by: PHARMACIST

## 2024-12-10 RX ORDER — AMOXICILLIN 250 MG
1 CAPSULE ORAL DAILY PRN
Qty: 30 TABLET | Refills: 11 | Status: SHIPPED | OUTPATIENT
Start: 2024-12-10

## 2024-12-10 ASSESSMENT — PAIN SCALES - GENERAL: PAINLEVEL_OUTOF10: NO PAIN (0)

## 2024-12-10 ASSESSMENT — PATIENT HEALTH QUESTIONNAIRE - PHQ9: SUM OF ALL RESPONSES TO PHQ QUESTIONS 1-9: 9

## 2024-12-10 NOTE — PROGRESS NOTES
Assessment & Plan     Severe episode of recurrent major depressive disorder, without psychotic features (H)  Overall her mood seems to be doing much better at this time with her PHQ-9 score showing significant improvement.  Staff reports that she is also much more engaged and seems happier.  The physical disabilities she was happening in the past have improved significantly as well.  Elected at this time to stay on the 150 mg dose of bupropion.  Refills were not needed at this time.    Schizoaffective disorder, depressive type (H)  Stable and continues to follow with psychiatry but no evidence of psychosis at this time.    Type 2 diabetes mellitus with stage 3a chronic kidney disease, with long-term current use of insulin (H)  Last set of laboratory testing show diabetes to be under excellent control.  Over the last year or so she has lost about 40 pounds with use of the Mounjaro.  Elected to keep the dose the same today.    Anemia, unspecified type  Future orders are placed to recheck her blood counts and some iron studies.  Folic acid, B12, and methylmalonic acid levels were normal.  Suspicion that it might represent anemia of chronic disease and it did improve temporarily while she was on prednisone for an undefined inflammatory arthropathy.  Currently she is on leflunomide under the direction of rheumatology but has not had an improvement in her hemoglobin on this medication.  - CBC with platelets and differential; Future  - Iron and iron binding capacity; Future  - Ferritin; Future      The longitudinal plan of care for the diagnosis(es)/condition(s) as documented were addressed during this visit. Due to the added complexity in care, I will continue to support Nena in the subsequent management and with ongoing continuity of care.      FUTURE APPOINTMENTS:       - Follow-up visit in 1 to 2 months    Subjective   Nena is a 64 year old, presenting for the following health issues:  Follow Up (MED REFRILL,  "PT ALSO WANTS RESULTS FOR SCAN SHE HAD ONE WEEK AGO )      12/10/2024    10:23 AM   Additional Questions   Roomed by Tila   Accompanied by caregiver     History of Present Illness       Reason for visit:  Follow up   She is taking medications regularly.           Patient is here today for scheduled follow-up.  She reports her mood is doing well and she is tolerating the bupropion without side effects.  They reviewed this with her psychiatrist who agreed that this was a reasonable medication and Nena would like to continue it on the current dose.  She would also like to continue her current dose of tirzepatide.  Last set of labs show diabetes to be under excellent control.  She was seen at rheumatology recently and complained of some pain and swelling in her left calf so an ultrasound was done which ruled out a DVT and those results were reviewed today.  By my exam today she has maybe some slight asymmetry but no phlebitic changes, palpable cords, or edema in either lower extremity.  With regards to her physical activity at her residence, the staff accompanying her today indicate that she is independent in all cares and needing minimal assistance which is a vast improvement over where it had been in the past.      Review of Systems  Constitutional, HEENT, cardiovascular, pulmonary, gi and gu systems are negative, except as otherwise noted.      Objective    /62 (BP Location: Right arm, Patient Position: Sitting, Cuff Size: Adult Regular)   Pulse 72   Resp 12   Ht 1.549 m (5' 1\")   Wt 93.9 kg (207 lb)   LMP 01/06/2015 (Exact Date)   SpO2 95%   BMI 39.11 kg/m    Body mass index is 39.11 kg/m .  Physical Exam   GENERAL: alert and no distress  EYES: Eyes grossly normal to inspection, PERRL and conjunctivae and sclerae normal  NECK: no adenopathy, no asymmetry, masses, or scars  RESP: lungs clear to auscultation - no rales, rhonchi or wheezes  CV: regular rate and rhythm, normal S1 S2, no S3 or S4, no " murmur, click or rub, no peripheral edema   MS: no gross musculoskeletal defects noted, no edema  SKIN: no suspicious lesions or rashes  NEURO: Normal strength and tone, mentation intact and speech normal  PSYCH: mentation appears normal, affect normal/bright    Lab on 11/26/2024   Component Date Value Ref Range Status    Hepatitis B Core Antibody Total 11/26/2024 Reactive (A)  Nonreactive Final    A reactive result indicates acute, chronic or past/resolved hepatitis B infection.    Hepatitis B Surface Antibody 11/26/2024 Reactive   Final    A reactive result indicates recovery from acute or chronic hepatitis B virus (HBV) infection or acquired immunity from HBV vaccination. This assay does not differentiate between a vaccine-induced immune response and an immune response induced by infection with HBV. A positive total antihepatitis B core result would indicate that the hepatitis B surface antibody response is due to past HBV infection.    Hepatitis B Surface Antibody Instr* 11/26/2024 >1,000.00  <8.5 m[IU]/mL Final    Estimated Average Glucose 11/26/2024 120 (H)  <117 mg/dL Final    Hemoglobin A1C 11/26/2024 5.8 (H)  0.0 - 5.6 % Final    Normal <5.7%   Prediabetes 5.7-6.4%    Diabetes 6.5% or higher     Note: Adopted from ADA consensus guidelines.    Creatinine Urine mg/dL 11/26/2024 214.0  mg/dL Final    The reference ranges have not been established in urine creatinine. The results should be integrated into the clinical context for interpretation.    Albumin Urine mg/L 11/26/2024 45.0  mg/L Final    The reference ranges have not been established in urine albumin. The results should be integrated into the clinical context for interpretation.    Albumin Urine mg/g Cr 11/26/2024 21.03  0.00 - 25.00 mg/g Cr Final    Microalbuminuria is defined as an albumin:creatinine ratio of 17 to 299 for males and 25 to 299 for females. A ratio of albumin:creatinine of 300 or higher is indicative of overt proteinuria.  Due to  biologic variability, positive results should be confirmed by a second, first-morning random or 24-hour timed urine specimen. If there is discrepancy, a third specimen is recommended. When 2 out of 3 results are in the microalbuminuria range, this is evidence for incipient nephropathy and warrants increased efforts at glucose control, blood pressure control, and institution of therapy with an angiotensin-converting-enzyme (ACE) inhibitor (if the patient can tolerate it).             Signed Electronically by: Albino Rainey MD

## 2024-12-10 NOTE — PROGRESS NOTES
Medication Therapy Management (MTM) Encounter    ASSESSMENT:                            Medication Adherence/Access: No issues identified.    Checked medications for drug interactions, and the following clinically significant interactions were found:  Leflonomide and atorvastatin  Summary Leflunomide may increase the serum concentration of IAJN8O5/1B3 (IFPS8E8/1B3) Substrates (Clinically Relevant with Inhibitors). Severity Moderate Reliability Rating Good  Patient Management Monitor patients for increased systemic concentrations and/or effects of KICF8V6/1B3 substrates during concomitant leflunomide use. Dose adjustment of the STOC5M9/1B3 substrate may be needed.    Diabetes  Stable.  Patient is meeting A1c goal of < 8%.  Patient is meeting goal of > 50% time in target with continuous glucose monitoring.   Not taking senna for constipation, will change order to as needed.    Hypertension:   Stable    Mental health  Stable    Osteoporosis  Reviewed due for DEXA, patient will schedule.    PLAN:                            Patient to schedule DEXA    Follow-up: 1 month    SUBJECTIVE/OBJECTIVE:                          Nena Tang is a 64 year old female seen for a follow-up visit. Today's visit is a co-visit with Albino Rainey MD. Patient was accompanied by group home staff.      Reason for visit: medication recheck.    Allergies/ADRs: Reviewed in chart  Past Medical History: Reviewed in chart  Tobacco: She reports that she has never smoked. She has never used smokeless tobacco.  Alcohol: not currently using    Medication Adherence/Access: no issues reported.  Patient has assistance with medication administration: group home.    Diabetes   Lantus 22 units once daily  Humalog sliding scale 2 units per 50mg/dL starting glucose 151  Mounjaro 7.5mg weekly (hasn't started 10mg yet, dose increased by diabetes ed)  Side effects: Diarrhea x2 days after she takes Mounjaro (3x BM, loose not watery. Has been holding  "senna.     Blood sugar monitoring: Continuous Glucose Monitor -           Current diabetes symptoms: none  Diet/Exercise: lower appetite with Mounjaro.  Happy with weight loss.  High post prandial glucose readings are correlated with times she eats candy.     Has lost about 40 pounds since starting Mounjaro.         Eye exam is up to date  Foot exam: up to date    Hypertension   Amlodipine 10mg daily  Metoprolol XL 50mg AM and 100mg PM  Losartan 100mg daily  Patient reports no current medication side effects.   Patient has blood pressure monitored by group home.  Home BP monitoring :  120-130s/80s.           Mental Health   Schizoaffective:  Quetiapine 100mg at bedtime   Paliperidone ER 12mg at bedtime  Sertraline 100mg daily  Bupropion XL 150mg daily - feeling much better since adding this medication  Hydroxyzine 25mg in the evening   Trazodone 100mg at bedtime  Clonazepam 0.5mg at bedtime  Prazosin 1mg at bedtime  Amantadine 100mg daily     Patient reports no mood concerns today, feeling good.   Patient is seeing a psychiatrist.     Bone Health   Osteoporosis:   calcium 600mg daily  Vitamin D 2000 units daily (recently started with low vitamin D levels)  alendronate (Fosamax) 70mg weekly (has been on current therapy 2 years (started 7/2022)  Patient is not experiencing side effects.  DEXA History: last 7/2022         Today's Vitals: LMP 01/06/2015 (Exact Date)   BP Readings from Last 1 Encounters:   12/10/24 110/62     Pulse Readings from Last 1 Encounters:   12/10/24 72     Wt Readings from Last 1 Encounters:   12/10/24 207 lb (93.9 kg)     Ht Readings from Last 1 Encounters:   12/10/24 5' 1\" (1.549 m)     Estimated body mass index is 39.11 kg/m  as calculated from the following:    Height as of an earlier encounter on 12/10/24: 5' 1\" (1.549 m).    Weight as of an earlier encounter on 12/10/24: 207 lb (93.9 kg).    Temp Readings from Last 1 Encounters:   12/10/24 97.4  F (36.3  C) (Temporal)    "   ----------------      I spent 30 minutes with this patient today. All changes were made via collaborative practice agreement with Albino Rainey MD. A copy of the visit note was provided to the patient's provider(s).    A summary of these recommendations was given to the patient (see AVS from today's appointment with Albino Rainey ).    Eugenia De Jesus, PharmD, BCACP   Medication Therapy Management Pharmacist   [facility]  [phone]          Medication Therapy Recommendations  No medication therapy recommendations to display

## 2024-12-10 NOTE — TELEPHONE ENCOUNTER
leflunomide (ARAVA) 20 MG tablet       Last Written Prescription Date:  11/9/24  Last Fill Quantity: 30,   # refills: 0  Last Office Visit: 11/26/24[facility]  Future Office visit:  2/25/25    CBC RESULTS:   Recent Labs   Lab Test 11/05/24  1147   WBC 4.9   RBC 3.10*   HGB 9.7*   HCT 31.1*      MCH 31.3   MCHC 31.2*   RDW 13.0          Creatinine   Date Value Ref Range Status   09/03/2024 0.89 0.51 - 0.95 mg/dL Final   06/15/2021 0.78 0.52 - 1.04 mg/dL Final   ]    Liver Function Studies -   Recent Labs   Lab Test 09/03/24  1122   PROTTOTAL 7.3   ALBUMIN 4.3   BILITOTAL 0.2   ALKPHOS 72   AST 22   ALT 22       Routing refill request to provider for review/approval because:  DMARD not on refill protocol/Provider to authorize    Moraima MURPHY RN  [facility]/Red Flag Triage & Med Refill Team

## 2024-12-11 ENCOUNTER — MYC MEDICAL ADVICE (OUTPATIENT)
Dept: RHEUMATOLOGY | Facility: CLINIC | Age: 64
End: 2024-12-11
Payer: COMMERCIAL

## 2024-12-11 DIAGNOSIS — M25.50 POLYARTHRALGIA: ICD-10-CM

## 2024-12-11 DIAGNOSIS — R76.8 RHEUMATOID FACTOR POSITIVE: ICD-10-CM

## 2024-12-11 DIAGNOSIS — Z79.899 LONG-TERM USE OF HIGH-RISK MEDICATION: Primary | ICD-10-CM

## 2024-12-11 DIAGNOSIS — R76.8 CENTROMERE ANTIBODY POSITIVE: ICD-10-CM

## 2024-12-11 DIAGNOSIS — R76.8 POSITIVE ANA (ANTINUCLEAR ANTIBODY): ICD-10-CM

## 2024-12-11 DIAGNOSIS — M19.90 INFLAMMATORY ARTHRITIS: ICD-10-CM

## 2024-12-11 NOTE — TELEPHONE ENCOUNTER
"Per Dr. Posey, patient \"due for a CBC, CMP and CRP\" for refill of medication.     Lab orders placed and patient called to inform. Patient states she will talk to caregiver to get labs done. Writer sent [employer] message response with information for patient and caregiver.     Tamiko Thorne RN    "

## 2024-12-12 NOTE — TELEPHONE ENCOUNTER
Call to patient to see when she can make lab appointment. Patient reports she will see her caregiver at 2:30 today and get that scheduled. Will await results for refill.     Tamiko Thorne RN

## 2024-12-12 NOTE — TELEPHONE ENCOUNTER
Call to patient. Lab scheduled for 12/17/24. Message routed to provider as patient's medication is out.    Tamiko Thorne RN

## 2024-12-15 RX ORDER — LEFLUNOMIDE 20 MG/1
TABLET ORAL
Qty: 30 TABLET | Refills: 0 | Status: SHIPPED | OUTPATIENT
Start: 2024-12-15

## 2024-12-17 ENCOUNTER — TELEPHONE (OUTPATIENT)
Dept: FAMILY MEDICINE | Facility: CLINIC | Age: 64
End: 2024-12-17

## 2024-12-17 ENCOUNTER — ANCILLARY PROCEDURE (OUTPATIENT)
Dept: MAMMOGRAPHY | Facility: CLINIC | Age: 64
End: 2024-12-17
Attending: FAMILY MEDICINE
Payer: COMMERCIAL

## 2024-12-17 ENCOUNTER — LAB (OUTPATIENT)
Dept: LAB | Facility: CLINIC | Age: 64
End: 2024-12-17
Payer: COMMERCIAL

## 2024-12-17 DIAGNOSIS — R76.8 CENTROMERE ANTIBODY POSITIVE: ICD-10-CM

## 2024-12-17 DIAGNOSIS — R76.8 RHEUMATOID FACTOR POSITIVE: ICD-10-CM

## 2024-12-17 DIAGNOSIS — M19.90 INFLAMMATORY ARTHRITIS: ICD-10-CM

## 2024-12-17 DIAGNOSIS — Z12.31 ENCOUNTER FOR SCREENING MAMMOGRAM FOR BREAST CANCER: ICD-10-CM

## 2024-12-17 DIAGNOSIS — Z12.31 VISIT FOR SCREENING MAMMOGRAM: ICD-10-CM

## 2024-12-17 DIAGNOSIS — M25.50 POLYARTHRALGIA: ICD-10-CM

## 2024-12-17 DIAGNOSIS — D64.9 ANEMIA, UNSPECIFIED TYPE: ICD-10-CM

## 2024-12-17 DIAGNOSIS — Z79.899 LONG-TERM USE OF HIGH-RISK MEDICATION: ICD-10-CM

## 2024-12-17 DIAGNOSIS — R76.8 POSITIVE ANA (ANTINUCLEAR ANTIBODY): ICD-10-CM

## 2024-12-17 LAB
ALBUMIN SERPL BCG-MCNC: 4.1 G/DL (ref 3.5–5.2)
ALP SERPL-CCNC: 79 U/L (ref 40–150)
ALT SERPL W P-5'-P-CCNC: 20 U/L (ref 0–50)
ANION GAP SERPL CALCULATED.3IONS-SCNC: 9 MMOL/L (ref 7–15)
AST SERPL W P-5'-P-CCNC: 21 U/L (ref 0–45)
BASOPHILS # BLD AUTO: 0 10E3/UL (ref 0–0.2)
BASOPHILS NFR BLD AUTO: 0 %
BILIRUB SERPL-MCNC: 0.2 MG/DL
BUN SERPL-MCNC: 20.3 MG/DL (ref 8–23)
CALCIUM SERPL-MCNC: 9.1 MG/DL (ref 8.8–10.4)
CHLORIDE SERPL-SCNC: 105 MMOL/L (ref 98–107)
CREAT SERPL-MCNC: 1.14 MG/DL (ref 0.51–0.95)
CRP SERPL-MCNC: <3 MG/L
EGFRCR SERPLBLD CKD-EPI 2021: 54 ML/MIN/1.73M2
EOSINOPHIL # BLD AUTO: 0.1 10E3/UL (ref 0–0.7)
EOSINOPHIL NFR BLD AUTO: 2 %
ERYTHROCYTE [DISTWIDTH] IN BLOOD BY AUTOMATED COUNT: 12.4 % (ref 10–15)
FERRITIN SERPL-MCNC: 433 NG/ML (ref 11–328)
GLUCOSE SERPL-MCNC: 144 MG/DL (ref 70–99)
HCO3 SERPL-SCNC: 23 MMOL/L (ref 22–29)
HCT VFR BLD AUTO: 29.4 % (ref 35–47)
HGB BLD-MCNC: 9.6 G/DL (ref 11.7–15.7)
IMM GRANULOCYTES # BLD: 0 10E3/UL
IMM GRANULOCYTES NFR BLD: 0 %
IRON BINDING CAPACITY (ROCHE): 320 UG/DL (ref 240–430)
IRON SATN MFR SERPL: 23 % (ref 15–46)
IRON SERPL-MCNC: 73 UG/DL (ref 37–145)
LYMPHOCYTES # BLD AUTO: 2.1 10E3/UL (ref 0.8–5.3)
LYMPHOCYTES NFR BLD AUTO: 46 %
MCH RBC QN AUTO: 31.7 PG (ref 26.5–33)
MCHC RBC AUTO-ENTMCNC: 32.7 G/DL (ref 31.5–36.5)
MCV RBC AUTO: 97 FL (ref 78–100)
MONOCYTES # BLD AUTO: 0.6 10E3/UL (ref 0–1.3)
MONOCYTES NFR BLD AUTO: 12 %
NEUTROPHILS # BLD AUTO: 1.9 10E3/UL (ref 1.6–8.3)
NEUTROPHILS NFR BLD AUTO: 40 %
PLATELET # BLD AUTO: 199 10E3/UL (ref 150–450)
POTASSIUM SERPL-SCNC: 4.4 MMOL/L (ref 3.4–5.3)
PROT SERPL-MCNC: 6.9 G/DL (ref 6.4–8.3)
RBC # BLD AUTO: 3.03 10E6/UL (ref 3.8–5.2)
SODIUM SERPL-SCNC: 137 MMOL/L (ref 135–145)
WBC # BLD AUTO: 4.7 10E3/UL (ref 4–11)

## 2024-12-17 PROCEDURE — 83540 ASSAY OF IRON: CPT

## 2024-12-17 PROCEDURE — 77063 BREAST TOMOSYNTHESIS BI: CPT | Mod: TC | Performed by: RADIOLOGY

## 2024-12-17 PROCEDURE — 77067 SCR MAMMO BI INCL CAD: CPT | Mod: TC | Performed by: RADIOLOGY

## 2024-12-17 PROCEDURE — 85025 COMPLETE CBC W/AUTO DIFF WBC: CPT

## 2024-12-17 PROCEDURE — 86140 C-REACTIVE PROTEIN: CPT

## 2024-12-17 PROCEDURE — 83550 IRON BINDING TEST: CPT

## 2024-12-17 PROCEDURE — 36415 COLL VENOUS BLD VENIPUNCTURE: CPT

## 2024-12-17 PROCEDURE — 80053 COMPREHEN METABOLIC PANEL: CPT

## 2024-12-17 PROCEDURE — 82728 ASSAY OF FERRITIN: CPT

## 2024-12-17 NOTE — TELEPHONE ENCOUNTER
Pt received phone call from  and instructing pt to schedule another appointment, pt not able to determine what type of appointment was requested.     Routing to order provider, please review and advise. Chart check completed; writer not able to determine who spoke to pt earlier today with the results of mammogram and instruction.

## 2024-12-17 NOTE — TELEPHONE ENCOUNTER
Relayed PCP note (below) transferred pt to scheduling to make follow-up imaging and US    Thank you  Cherri MURPHY RN  [facility]

## 2024-12-18 NOTE — TELEPHONE ENCOUNTER
leflunomide (ARAVA) 20 MG tablet   Last Written Prescription Date: 11/9/24  Last Fill Quantity: 30, # refills: 0   Last Office Visit: 11/26/24 [facility] Future Office visit: 2/25/25     CBC RESULTS:   Recent Labs   Lab Test 12/17/24  0907   WBC 4.7   RBC 3.03*   HGB 9.6*   HCT 29.4*   MCV 97   MCH 31.7   MCHC 32.7   RDW 12.4          Creatinine   Date Value Ref Range Status   12/17/2024 1.14 (H) 0.51 - 0.95 mg/dL Final   06/15/2021 0.78 0.52 - 1.04 mg/dL Final       Liver Function Studies -   Recent Labs   Lab Test 12/17/24  0907   PROTTOTAL 6.9   ALBUMIN 4.1   BILITOTAL 0.2   ALKPHOS 79   AST 21   ALT 20       Routing refill request to provider for review/approval because: Patient provided 30 day supply, labs resulted.    Tamiko Thorne RN

## 2024-12-20 ENCOUNTER — HOSPITAL ENCOUNTER (OUTPATIENT)
Dept: MAMMOGRAPHY | Facility: CLINIC | Age: 64
Discharge: HOME | End: 2024-12-20
Attending: FAMILY MEDICINE
Payer: COMMERCIAL

## 2024-12-20 DIAGNOSIS — R92.8 ABNORMAL MAMMOGRAM: ICD-10-CM

## 2024-12-20 PROCEDURE — 77065 DX MAMMO INCL CAD UNI: CPT | Mod: RT

## 2024-12-20 PROCEDURE — 76642 ULTRASOUND BREAST LIMITED: CPT | Mod: RT

## 2024-12-29 ENCOUNTER — HEALTH MAINTENANCE LETTER (OUTPATIENT)
Age: 64
End: 2024-12-29

## 2024-12-31 DIAGNOSIS — K59.00 CONSTIPATION, UNSPECIFIED CONSTIPATION TYPE: ICD-10-CM

## 2024-12-31 DIAGNOSIS — M25.551 HIP PAIN, RIGHT: ICD-10-CM

## 2024-12-31 DIAGNOSIS — R30.0 DYSURIA: ICD-10-CM

## 2024-12-31 DIAGNOSIS — I10 ESSENTIAL HYPERTENSION WITH GOAL BLOOD PRESSURE LESS THAN 130/80: ICD-10-CM

## 2024-12-31 RX ORDER — PNV NO.95/FERROUS FUM/FOLIC AC 28MG-0.8MG
250 TABLET ORAL AT BEDTIME
Qty: 28 TABLET | OUTPATIENT
Start: 2024-12-31

## 2024-12-31 RX ORDER — AMLODIPINE BESYLATE 10 MG/1
10 TABLET ORAL EVERY MORNING
Qty: 28 TABLET | OUTPATIENT
Start: 2024-12-31

## 2024-12-31 RX ORDER — PSEUDOEPHED/ACETAMINOPH/DIPHEN 30MG-500MG
1000 TABLET ORAL 3 TIMES DAILY
Qty: 168 TABLET | Refills: 4 | OUTPATIENT
Start: 2024-12-31

## 2025-01-02 RX ORDER — ASCORBIC ACID 500 MG
TABLET ORAL
Qty: 180 TABLET | Refills: 1 | Status: SHIPPED | OUTPATIENT
Start: 2025-01-02

## 2025-01-06 ENCOUNTER — MYC MEDICAL ADVICE (OUTPATIENT)
Dept: FAMILY MEDICINE | Facility: CLINIC | Age: 65
End: 2025-01-06

## 2025-01-06 DIAGNOSIS — Z79.4 TYPE 2 DIABETES MELLITUS WITH MILD NONPROLIFERATIVE RETINOPATHY WITHOUT MACULAR EDEMA, WITH LONG-TERM CURRENT USE OF INSULIN, UNSPECIFIED LATERALITY (H): ICD-10-CM

## 2025-01-06 DIAGNOSIS — I10 ESSENTIAL HYPERTENSION WITH GOAL BLOOD PRESSURE LESS THAN 130/80: ICD-10-CM

## 2025-01-06 DIAGNOSIS — K59.00 CONSTIPATION, UNSPECIFIED CONSTIPATION TYPE: ICD-10-CM

## 2025-01-06 DIAGNOSIS — E11.3299 TYPE 2 DIABETES MELLITUS WITH MILD NONPROLIFERATIVE RETINOPATHY WITHOUT MACULAR EDEMA, WITH LONG-TERM CURRENT USE OF INSULIN, UNSPECIFIED LATERALITY (H): ICD-10-CM

## 2025-01-06 DIAGNOSIS — M25.551 HIP PAIN, RIGHT: ICD-10-CM

## 2025-01-06 RX ORDER — AMLODIPINE BESYLATE 10 MG/1
10 TABLET ORAL EVERY MORNING
Qty: 30 TABLET | Refills: 11 | Status: SHIPPED | OUTPATIENT
Start: 2025-01-06

## 2025-01-06 RX ORDER — ISOPROPYL ALCOHOL 70 ML/100ML
SWAB TOPICAL
Qty: 200 EACH | Refills: 3 | Status: CANCELLED | OUTPATIENT
Start: 2025-01-06

## 2025-01-06 RX ORDER — ISOPROPYL ALCOHOL 70 ML/100ML
SWAB TOPICAL
Qty: 200 EACH | Refills: 3 | Status: SHIPPED | OUTPATIENT
Start: 2025-01-06

## 2025-01-06 RX ORDER — PSEUDOEPHED/ACETAMINOPH/DIPHEN 30MG-500MG
1000 TABLET ORAL 3 TIMES DAILY
Qty: 168 TABLET | Refills: 4 | Status: SHIPPED | OUTPATIENT
Start: 2025-01-06

## 2025-01-09 NOTE — PROGRESS NOTES
----- Message from Madeleine sent at 1/9/2025  1:11 PM CST -----  Regarding: Appt  Contact: Pt [phone]  Pt is returning a call about cancelled appt please call   SUBJECTIVE:                                                    Nena Tang is a 58 year old  female who presents to clinic today for the following health issues:  [facility]: Richardson Lopez    Hypotension Follow-up  Patient states that she has been having some dizziness and lightheadedness with low pressure. Patient states she has not been drinking as much water as she should to keep herself hydrated. Patient's BP medication was adjusted at the last visit.     Outpatient blood pressures are not being checked.    Low Salt Diet: not monitoring salt  BP Readings from Last 3 Encounters:   05/22/18 108/74   04/17/18 98/64   03/13/18 112/72         Diabetes Follow-up  Reports that her blood sugar was 82 before lunch today; states that she had a small breakfast with a high insulin replacement. Reports blood sugars ranging 's over the past days.   Patient is checking blood sugars: 3-4 times daily.    Blood sugar testing frequency justification: Uncontrolled diabetes  Results are as follows:         varies    Diabetic concerns: yes     Symptoms of hypoglycemia (low blood sugar): dizzy, blurred vision- related to BP's     Paresthesias (numbness or burning in feet) or sores: No     Date of last diabetic eye exam: 4/23/18    BP Readings from Last 2 Encounters:   04/17/18 98/64   03/13/18 112/72     Hemoglobin A1C (%)   Date Value   02/12/2018 6.8 (H)   12/09/2017 8.9 (H)     LDL Cholesterol Calculated (mg/dL)   Date Value   06/27/2017 64   07/14/2015 78     LDL Cholesterol Direct (mg/dL)   Date Value   07/13/2016 137 (H)       Mental Health Follow up: Anxiety/Depression  Patient will be moving to a senior living. Patient states that she is excited and scared about the move but is looking forward to living on her own again. Patient is also planning on getting a job-probably as a  when she moves out on her own.     Status since last visit: mood has been up    See PHQ-9 for current symptoms.  Other  associated symptoms: None    Complicating factors: has been doing a lot of worrying  Significant life event:  No   Current substance abuse:  None  Anxiety or Panic symptoms:  No    Sleep - still having visual hallucination at bedtime when lights are off. Sleeps with a night light.   Appetite - good, no concerns.Discussed increasing her water intake to help maintain her BP and also help with weight loss.   Exercise - PT about an hour, weekly. Starting to walk some more. Patient would like to be able to walk more frequently without any limitations.     Smoking - no  Alcohol - no  Street drugs - no  Marijuana - no  PHQ-9  English PHQ-9   Any Language               Problems taking medications regularly: No    Medication side effects: none    Diet: regular (no restrictions)    Social History   Substance Use Topics     Smoking status: Never Smoker     Smokeless tobacco: Never Used     Alcohol use No      Comment: last month        Problem list and histories reviewed & adjusted, as indicated.  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 (H)     Illiterate     Irritable bowel syndrome     overweight - BMI >35     Takotsubo cardiomyopathy     CAD (coronary artery disease)     Restless legs syndrome (RLS)     CINDY (obstructive sleep apnea)- mild AHI 10.3     Verbal auditory hallucination     Chronic low back pain     Schizoaffective disorder, depressive type (H)     Migraine headache     HTN, goal below 140/90     Health Care Home     Lumbago     Cervicalgia     Cocaine abuse, episodic     Suicidal ideation     Esophageal reflux     Mild nonproliferative diabetic retinopathy (H)     Tardive dyskinesia     Alcohol use     Left cataract     Falls frequently     History of uterine cancer     Psychophysiological  insomnia     Dysuria     Asymptomatic postmenopausal status     Abdominal pain, right lower quadrant     Sepsis (H)     Pneumonia of right lower lobe due to infectious organism (H)     Infectious encephalopathy     Non-intractable vomiting with nausea     Acute respiratory failure with hypoxia (H)     Thoughts of self harm     Gastroenteritis     Posttraumatic stress disorder     Past Surgical History:   Procedure Laterality Date     C OOPHORECTORMY FOR RAHEL, W/BX  1983    UTERINE     CATARACT IOL, RT/LT Bilateral 2017     COLONOSCOPY N/A 3/16/2017    Procedure: COLONOSCOPY;  Surgeon: Traci Gonzalez MD;  Location:  GI     Coronary CTA  5/21/2014     HYSTERECTOMY  1983    uterine cancer yearly pap's per provider.     LAPAROSCOPIC CHOLECYSTECTOMY  2008     PHACOEMULSIFICATION CLEAR CORNEA WITH STANDARD INTRAOCULAR LENS IMPLANT Left 5/5/2017    Procedure: PHACOEMULSIFICATION CLEAR CORNEA WITH STANDARD INTRAOCULAR LENS IMPLANT;  LEFT EYE PHACOEMULSIFICATION CLEAR CORNEA WITH STANDARD INTRAOCULAR LENS IMPLANT ;  Surgeon: Tyra Diaz MD;  Location:  [facility]     PHACOEMULSIFICATION CLEAR CORNEA WITH STANDARD INTRAOCULAR LENS IMPLANT Right 6/30/2017    Procedure: PHACOEMULSIFICATION CLEAR CORNEA WITH STANDARD INTRAOCULAR LENS IMPLANT;  RIGHT EYE PHACOEMULSIFICATION CLEAR CORNEA WITH STANDARD INTRAOCULAR LENS IMPLANT;  Surgeon: Tyra Diaz MD;  Location:  [facility]     RELEASE TRIGGER FINGER  10/11/2012    Left thumb. Procedure: RELEASE TRIGGER FINGER;  LEFT THUMB TRIGGER RELEASE;  Surgeon: Tay Langley MD;  Location:  [facility]     RELEASE TRIGGER FINGER Right 12/26/2016    Procedure: RELEASE TRIGGER FINGER;  Surgeon: Albino Castañeda MD;  Location: [facility] OR       Social History   Substance Use Topics     Smoking status: Never Smoker     Smokeless tobacco: Never Used     Alcohol use No      Comment: last month     Family History   Problem Relation Age of Onset     CANCER Mother      BLADDER      Respiratory Mother      COPD     GASTROINTESTINAL DISEASE Mother      CIRRHOSIS OF LI BOLIVAR     Alcohol/Drug Mother      DIABETES Mother      Hypertension Mother      Lipids Mother      C.A.D. Mother      Glaucoma Mother      Alcohol/Drug Sister      MENTAL ILLNESS Sister      Alcohol/Drug Sister      Psychotic Disorder Sister      CANCER Maternal Grandmother      UNKNOWN TYPE     CANCER Brother      COLON     Cancer - colorectal Brother      IN HIS LATE 30S     Alcohol/Drug Brother       OF HEROIN OVERDOSE AT AGE 22 YRS     Macular Degeneration No family hx of            Current Outpatient Prescriptions   Medication Sig Dispense Refill     acetaminophen (TYLENOL) 500 MG tablet Take 2 tablets (1,000 mg) by mouth 3 times daily as needed for mild pain 100 tablet 0     ACETAMINOPHEN PO Take 1,000 mg by mouth every 6 hours as needed for pain or fever       albuterol (PROAIR HFA/PROVENTIL HFA/VENTOLIN HFA) 108 (90 BASE) MCG/ACT Inhaler Inhale 2 puffs into the lungs every 6 hours as needed for shortness of breath / dyspnea or wheezing 3 Inhaler 1     aspirin 81 MG EC tablet TAKE 1 TABLET (81MG) BY MOUTH DAILY 28 tablet 3     atorvastatin (LIPITOR) 80 MG tablet TAKE 1 TABLET (80MG) BY MOUTH DAILY 28 tablet 11     benztropine (COGENTIN) 0.5 MG tablet Take 1 tablet (0.5 mg) by mouth 2 times daily 60 tablet 1     blood glucose monitoring (ONE TOUCH DELICA) lancets Use to test blood sugar 2 times daily or as directed.  Ok to substitute alternative if insurance prefers. 150 each 11     blood glucose monitoring (ONETOUCH VERIO IQ) test strip Use to test blood sugar 4 times daily .  Ok to substitute alternative if insurance prefers. 200 strip 11     calcium carbonate (OS-ALLEN 600 MG [place]. CA) 1500 (600 CA) MG tablet Take 1 tablet (1,500 mg) by mouth daily 180 tablet 3     clotrimazole (LOTRIMIN) 1 % cream Apply topically 2 times daily       DiphenhydrAMINE HCl (DIPHENDRYL PO) Take 25 mg by mouth every 6 hours as needed for  itching       doxycycline (VIBRAMYCIN) 100 MG capsule Take 1 capsule (100 mg) by mouth 2 times daily 14 capsule 0     gabapentin (NEURONTIN) 300 MG capsule TAKE 2 CAPSULES (600MG) BY MOUTH THREE TIMES A  capsule 3     glucose 4 G CHEW chewable tablet Take 2 every 15 minutes for blood sugar <70mg/dL. Recheck blood sugar every 15 minutes until above 70mg/dL, then eat a substantial meal. 20 tablet 1     ibuprofen (ADVIL,MOTRIN) 600 MG tablet Take 1 tablet (600 mg) by mouth every 4 hours as needed for moderate pain 60 tablet 0     insulin aspart (NOVOLOG FLEXPEN) 100 UNIT/ML injection Inject 20 Units Subcutaneous 3 times daily (with meals) Once daily, can add additional 5 units if BGs are >500mg/dL. 15 mL 11     insulin glargine (LANTUS) 100 UNIT/ML injection Inject 48 Units Subcutaneous At Bedtime 3 mL 11     insulin pen needle (NOVOFINE 30) 30G X 8 MM USE 4 DAILY OR AS DIRECTED 400 each 0     losartan (COZAAR) 100 MG tablet TAKE 1 TABLET (100MG) BY MOUTH DAILY 28 tablet 3     melatonin 5 MG CAPS Take 2 capsules by mouth daily At dinnertime 180 capsule 3     metoprolol succinate (TOPROL-XL) 50 MG 24 hr tablet Take 1 tablet (50 mg) by mouth daily 60 tablet 3     order for DME Hold Novolog if meals are refused. 1 each 0     order for DME Diabetic Shoes 2 each 0     order for DME Equipment being ordered: 4 Wheeled walker with seat and brakes. 1 each 0     paliperidone (INVEGA) 9 MG 24 hr tablet Take 1 tablet (9 mg) by mouth every morning 30 tablet 0     polyethylene glycol (MIRALAX/GLYCOLAX) powder TAKE 8.5 GRAMS (1/2 CAPFUL) BY MOUTH DAILY 527 g 3     prochlorperazine (COMPAZINE) 5 MG tablet Take 1 tablet (5 mg) by mouth every 6 hours as needed for nausea or vomiting 90 tablet 0     ranitidine (ZANTAC) 300 MG tablet TAKE 1 TABLET (300MG) BY MOUTH AT BEDTIME 90 tablet 1     senna-docusate (SENOKOT-S;PERICOLACE) 8.6-50 MG per tablet Take 1 tablet by mouth 2 times daily as needed for constipation       sertraline  (ZOLOFT) 100 MG tablet Take 1.5 tablets (150 mg) by mouth daily 60 tablet 1     SUMAtriptan (IMITREX) 25 MG tablet Take 1-2 tablets (25-50 mg) by mouth at onset of headache for migraine May repeat in 2 hours. Max 8 tablets/24 hours. 12 tablet 1     TRULICITY 1.5 MG/0.5ML pen INJECT 1.5MG SUBCUTANEOUSLY EVERY SEVEN DAYS 2 mL 0     vitamin D3 (CHOLECALCIFEROL) 01591 UNITS capsule TAKE 1 CAPSULE (50,000 UNITS) MONTHLY 12 capsule 1     Allergies   Allergen Reactions     Imidazole Antifungals Hives     Tolerates diflucan     Ketoprofen Itching     Pruritis to topical     Lisinopril Hives     Metformin Other (See Comments)     Patient hospitalized for lactic acidosis - admitting provider suspectd caused by metformin     Metronidazole Hives     Posaconazole Hives     Tolerates diflucan     Recent Labs   Lab Test  02/12/18   1408  02/08/18   2155  01/23/18   1526   01/13/18   2315  12/09/17   0748   11/07/17   0801   06/27/17   1358   12/29/16   0909   07/13/16   1350   07/14/15   1215   09/03/13   1156   A1C  6.8*   --    --    --    --   8.9*   --   8.9*   < >  7.0*   < >   --    < >   --    < >  9.1*   < >  10.8*   LDL   --    --    --    --    --    --    --    --    --   64   --    --    --   137*   --   78   < >  90   HDL   --    --    --    --    --    --    --    --    --   37*   --    --    --    --    --   39*   --   37*   TRIG   --    --    --    --    --    --    --    --    --   267*   --    --    --    --    --   151*   --   171*   ALT   --   23   --    --   29   --    --   23   < >  32   < >  26   < >   --    < >   --    < >  38   CR   --   1.09*  0.91   < >  1.62*   --    < >  0.88   < >  0.78   < >  0.72   < >   --    < >  0.67   < >  0.54   GFRESTIMATED   --   52*  64   < >  33*   --    < >  66   < >  76   < >  83   < >   --    < >  >90  Non  GFR Calc     < >  >90   GFRESTQUENTINACK   --   63  77   < >  40*   --    < >  80   < >  >90   GFR Calc     < >  >90   GFR  Calc     < >   --    < >  >90   GFR Calc     < >  >90   POTASSIUM   --   4.3  4.1   --   4.0   --    < >  3.9   < >  4.3   < >  4.2   < >   --    < >  4.3   < >  4.4   TSH   --    --    --    --    --    --    --   3.21   --    --    --   2.24   < >   --    < >   --    < >  1.42    < > = values in this interval not displayed.      BP Readings from Last 3 Encounters:   04/17/18 98/64   03/13/18 112/72   02/12/18 110/62    Wt Readings from Last 3 Encounters:   04/17/18 234 lb (106.1 kg)   03/13/18 236 lb (107 kg)   02/12/18 228 lb 8 oz (103.6 kg)        Labs reviewed in EPIC  Problem list, Medication list, Allergies, and Medical/Social/Surgical histories reviewed in [facility] and updated as appropriate.     ROS: Constitutional, neuro, ENT, endocrine, pulmonary, cardiac, gastrointestinal, genitourinary, musculoskeletal, integument and psychiatric systems are negative, except as otherwise noted above in the HPI.   OBJECTIVE:                                                    /74 (BP Location: Right arm)  Pulse 79  Temp 98.4  F (36.9  C) (Oral)  Resp 16  Wt 236 lb (107 kg)  LMP 01/06/2015  SpO2 96%  BMI 45.94 kg/m2  Body mass index is 45.94 kg/(m^2).  GENERAL: healthy, alert,  no distress.   EYES: Eyes grossly normal to inspection, extraocular movements - intact  RESP: lungs clear to auscultation - no rales, no rhonchi, no wheezes  CV: regular rates and rhythm, normal S1 S2, no S3 or S4 and no murmur, no click or rub  ABDOMEN: soft, no tenderness, normal bowel sounds- un palpable organs due to body habitus.    MS: extremities- no gross deformities noted, no edema  SKIN: no suspicious lesions, no rashes  NEURO: strength and tone- normal, sensory exam- grossly normal, mentation- intact, speech- normal, Non focal no aphasia. No facial asymmetry.   Pelvic exam: normal vagina and vulva, vaginal discharge described as creamy, uterus is surgically absent- no os visualized, pap smear done, wet mount exam  shows normal epithelial cells.    Mental Status Assessment:  Appearance:   Appropriate   Eye Contact:   Good   Psychomotor Behavior: Normal   Attitude:   Cooperative   Orientation:   All  Speech   Rate / Production: Normal    Volume:  Normal   Mood:    Normal  Affect:    Appropriate   Thought Content:  Clear   Thought Form:  Coherent  Logical   Insight:    Fair   Attention Span and Concentration: appropriate  Recent and Remote Memory:  intact  Fund of Knowledge: appropriate  Muscle Strength and Tone: normal   Suicidal Ideation: reports no thoughts, no intention  Hallucination: Yes    See [facility] notes     Diagnostic Test Results:  Results for orders placed or performed in visit on 05/22/18 (from the past 24 hour(s))   Wet prep   Result Value Ref Range    Specimen Description Vagina     Wet Prep No clue cells seen     Wet Prep No Trichomonas seen     Wet Prep No yeast seen      *Note: Due to a large number of results and/or encounters for the requested time period, some results have not been displayed. A complete set of results can be found in Results Review.        ASSESSMENT/PLAN:                                                    (E11.65,  Z79.4) Type 2 diabetes mellitus with hyperglycemia, with long-term current use of insulin (H)  (primary encounter diagnosis)  Comment: as noted above.   Plan: **A1C FUTURE 1yr        -Will follow-up with medication adjustment at the next visit.     (M62.81) Generalized muscle weakness  Comment: Patient reporting some hip pain with the recent increase in activity level.   Plan: order for DME        Patient is currently working with PT to strengthen her gait.   -Order for a walker written to provide more stability. Encouraged patient to use her cane frequently for support.     (Z12.4) Screening for cervical cancer  Comment: Routine screening.   Plan: Pap imaged thin layer screen with HPV -         recommended age 30 - 65 years (select HPV order        below), HPV High Risk Types DNA  Cervical        -Will discuss results with patient at the next visit.   -Pap done today.     (N89.8) Vaginal discharge  Comment: Increased discharge and odor during the pap smear.   Plan: Wet prep        -Negative for yeast infection.       Patient Instructions   -Will call with results of the pap.     -Lab today: checking A1C    -Call clinic with any questions or concerns.     I spent Greater than 40 minutes was spent face to face with Nena Tang, with greater than 50 % in counselling and consultation about diabetes, depression, pain and routine screening.       ART F[facility] PRIMARY CARE

## 2025-01-10 ENCOUNTER — HOSPITAL ENCOUNTER (OUTPATIENT)
Dept: MAMMOGRAPHY | Facility: CLINIC | Age: 65
Discharge: HOME | End: 2025-01-10
Attending: FAMILY MEDICINE
Payer: COMMERCIAL

## 2025-01-10 DIAGNOSIS — R92.8 ABNORMAL ULTRASOUND OF BREAST: ICD-10-CM

## 2025-01-10 PROCEDURE — A4648 IMPLANTABLE TISSUE MARKER: HCPCS

## 2025-01-10 PROCEDURE — 38505 NEEDLE BIOPSY LYMPH NODES: CPT | Mod: RT

## 2025-01-10 PROCEDURE — 272N000615 US BREAST BIOPSY CORE NEEDLE RIGHT

## 2025-01-10 PROCEDURE — 250N000009 HC RX 250: Performed by: RADIOLOGY

## 2025-01-10 PROCEDURE — 88360 TUMOR IMMUNOHISTOCHEM/MANUAL: CPT | Mod: 26 | Performed by: PATHOLOGY

## 2025-01-10 PROCEDURE — 88305 TISSUE EXAM BY PATHOLOGIST: CPT | Mod: TC | Performed by: FAMILY MEDICINE

## 2025-01-10 PROCEDURE — 88305 TISSUE EXAM BY PATHOLOGIST: CPT | Mod: 26 | Performed by: PATHOLOGY

## 2025-01-10 PROCEDURE — 999N000065 MA POST PROCEDURE RIGHT

## 2025-01-10 PROCEDURE — A4648 IMPLANTABLE TISSUE MARKER: HCPCS | Mod: RT

## 2025-01-10 PROCEDURE — 88360 TUMOR IMMUNOHISTOCHEM/MANUAL: CPT | Mod: TC | Performed by: FAMILY MEDICINE

## 2025-01-10 RX ADMIN — LIDOCAINE HYDROCHLORIDE 10 ML: 10 INJECTION, SOLUTION INFILTRATION; PERINEURAL at 09:45

## 2025-01-10 NOTE — DISCHARGE INSTRUCTIONS
After Your Breast Biopsy  Bleeding, bruising, and pain  Breast tenderness and some bruising is normal and may last several days. You may wear your bra overnight to support the breast.  You may use an ice pack for pain. Place it over the area for 15 to 20 minutes, several times a day.  You may take over-the-counter pain medicine:  On the day of the biopsy, we recommend Tylenol (acetaminophen) because it does not raise your risk of bleeding.  The next day, you may take an anti-inflammatory medicine (aspirin, ibuprofen, Motrin, Aleve, Advil), unless your doctor tells you not to.  Bandages and showering  Keep your bandage in place until tomorrow morning. Don't get it wet.  If you have small pieces of tape on the skin, leave them in place. They will fall off on their own, or you can remove them after 5 days.  You may shower the next morning after your biopsy.  Activity  No heavy activity (no running, no gym workouts, no lifting, no vacuuming, etc.) on the day of your biopsy.  You may go back to normal activity the next day. But limit what you do if you still have pain or discomfort.  Infection  Infection is rare. Signs of infection include:  Fever (including sweats and chills)  Redness  Pain that gets worse  Fluid draining from the biopsy site  Biopsy results  Results may take up to 5 business days.  A nurse or doctor from the Breast Center will call with your results. We will also send the results to the doctor that ordered your biopsy.  If you have not gotten your results in 5 days, please call the Breast Center.  Call the Breast Center with questions or if:   You have bleeding that lasts more than 20 minutes.  You have pain that you can't control.  You have signs of infection (fever, sweats, chills, redness, increasing pain, or drainage).  After hours, please call the doctor who ordered your biopsy.  For informational purposes only. Not to replace the advice of your health care provider. Copyright   2010 [facility]. All rights reserved. Clinically reviewed by Arianne Stephenson, Director, [facility] Breast Imaging. [employer] [id number] - REV 08/23.

## 2025-01-10 NOTE — DISCHARGE INSTRUCTIONS
After Your Breast Biopsy  Bleeding, bruising, and pain  Breast tenderness and some bruising is normal and may last several days. You may wear your bra overnight to support the breast.  You may use an ice pack for pain. Place it over the area for 15 to 20 minutes, several times a day.  You may take over-the-counter pain medicine:  On the day of the biopsy, we recommend Tylenol (acetaminophen) because it does not raise your risk of bleeding.  The next day, you may take an anti-inflammatory medicine (aspirin, ibuprofen, Motrin, Aleve, Advil), unless your doctor tells you not to.  Bandages and showering  Keep your bandage in place until tomorrow morning. Don't get it wet.  If you have small pieces of tape on the skin, leave them in place. They will fall off on their own, or you can remove them after 5 days.  You may shower the next morning after your biopsy.  Activity  No heavy activity (no running, no gym workouts, no lifting, no vacuuming, etc.) on the day of your biopsy.  You may go back to normal activity the next day. But limit what you do if you still have pain or discomfort.  Infection  Infection is rare. Signs of infection include:  Fever (including sweats and chills)  Redness  Pain that gets worse  Fluid draining from the biopsy site  Biopsy results  Results may take up to 5 business days.  A nurse or doctor from the Breast Center will call with your results. We will also send the results to the doctor that ordered your biopsy.  If you have not gotten your results in 5 days, please call the Breast Center.  Call the Breast Center with questions or if:   You have bleeding that lasts more than 20 minutes.  You have pain that you can't control.  You have signs of infection (fever, sweats, chills, redness, increasing pain, or drainage).  After hours, please call the doctor who ordered your biopsy.  For informational purposes only. Not to replace the advice of your health care provider. Copyright   2010 [facility]. All rights reserved. Clinically reviewed by Arianne Stephenson, Director, [facility] Breast Imaging. [employer] [id number] - REV 08/23.

## 2025-01-13 ENCOUNTER — TELEPHONE (OUTPATIENT)
Dept: MAMMOGRAPHY | Facility: CLINIC | Age: 65
End: 2025-01-13
Payer: COMMERCIAL

## 2025-01-13 NOTE — TELEPHONE ENCOUNTER
[facility]AL[facility]NA[facility]    Call placed to Nena.  verified.     Nena was notified that pathology results from RIGHT breast and RIGHT axilla lymph node biopsies performed on 1/10/2025 revealed:     [facility]  Nena Tang [id number]  F, 1960  Surgical Pathology Report (Final result) [id number]  Authorizing Provider: Albino Rainey MD Ordering Provider: Albino Rainey MD  Ordering Location: [facility]  Collected: 01/10/2025 09:50 AM  Pathologist: Litzy Fernandez MD Received: 01/10/2025 11:52 AM  .  Specimens  A Breast, Right  B Lymph Node(s), Axillary, Right  .  .  Final Diagnosis  A. Breast, right, ultrasound-guided needle core biopsy:  -Invasive ductal carcinoma with mucinous features, Liban grade 2 of 3 (tubule formation: 3, nuclear grade: 2,  mitoses: 1), 8 mm in greatest size.  -See comment.  B. Lymph node, right axillary, ultrasound-guided needle core biopsy:  -Metastatic carcinoma in one lymph node () with almost complete replacement of lymph node structure.  -Size of greatest metastatic deposit: 8.5 mm.  -Extracapsular extension identified.  -See comment.  Electronically signed by Litzy Fernandez MD on 2025 at 10:23 AM    Per Breast Center Radiologist, Dr. Meaghan Smith, results are concordant with imaging findings.     Recommendation: Surgical and Oncology Consults.    Blanka Aquino RN navigator will be reaching out to patient within the next 24 - 48 hours to assist her in getting consults scheduled.  Nena requested that Blanka call the nurse at her group home to arrange follow up appointments.  I encouraged Nena to bring her son or sister with her to the follow up appointments.  Nena has Blanka's phone number if needed.  Patient states no problems with biopsy site.      Ordering provider, Dr. Albino Rainey, has been notified of the results and recommendations for follow up.  I will forward this note,  along with the pathology results, to him and Blanka Aquino RN.  All patient's questions answered appropriately and thoroughly. Patient verbalized understanding.  Patient will call our office in the interim with additional questions/concerns.     Both parties in agreement of above plan.      Arabella Haney RN BSN  Procedure Nurse  [facility]  [phone]

## 2025-01-14 ENCOUNTER — TELEPHONE (OUTPATIENT)
Dept: MAMMOGRAPHY | Facility: CLINIC | Age: 65
End: 2025-01-14
Payer: COMMERCIAL

## 2025-01-14 LAB
PATH REPORT.COMMENTS IMP SPEC: ABNORMAL
PATH REPORT.COMMENTS IMP SPEC: YES
PATH REPORT.FINAL DX SPEC: ABNORMAL
PATH REPORT.GROSS SPEC: ABNORMAL
PATH REPORT.MICROSCOPIC SPEC OTHER STN: ABNORMAL
PATH REPORT.RELEVANT HX SPEC: ABNORMAL
PATHOLOGY SYNOPTIC REPORT: ABNORMAL
PHOTO IMAGE: ABNORMAL

## 2025-01-14 NOTE — TELEPHONE ENCOUNTER
Breast Care Coordination:    Called Nena and Sandi Pedersen (group home RN), to discuss recommendation for and assist with scheduling of Surgical and Medical Oncology consultation(s).     The following appointments were scheduled at the [facility], in [place]:    1/17/2025 0800 - Dr. Xiang Baca, Medical Oncologist    1/17/2025 0845 - Dr. Albino Giron, General Surgeon    Location provided. Patient will plan to arrive at 0745, and will have a group home staff member present as support during her appointments.     Let her know that writer will also be present during her surgical consult, and will provide additional education, resources, and support for her moving forward. Direct number provided.     Patient and RN verbalized understanding and all questions have been addressed at this time.    FYI - PCP.    Blanka Aquino, RN, BSN, PHN, CBCN  Breast Nurse Coordinator  [facility]  [phone]

## 2025-01-14 NOTE — TELEPHONE ENCOUNTER
RECORDS STATUS - BREAST    RECORDS REQUESTED FROM: [facility]   NOTES DETAILS STATUS   OFFICE NOTE from referring provider Epic Dr. Albino Rainey   MEDICATION LIST [facility]     LABS     PATHOLOGY REPORTS  (Tissue diagnosis, Stage, ER/WY percentage positive and intensity of staining, HER2 IHC, FISH, and all biopsies from breast and any distant metastasis)                 Epic 1/10/2025 - [id number]    IMAGING (NEED IMAGES & REPORT)     MAMMO PACS 1/10/2025 -12/17/2024   ULTRASOUND PACS US Breast: 1/10/2025, 12/20/2024

## 2025-01-15 ENCOUNTER — MYC REFILL (OUTPATIENT)
Dept: FAMILY MEDICINE | Facility: CLINIC | Age: 65
End: 2025-01-15

## 2025-01-15 DIAGNOSIS — R21 RASH AND NONSPECIFIC SKIN ERUPTION: ICD-10-CM

## 2025-01-15 RX ORDER — NYSTATIN 100000 [USP'U]/G
POWDER TOPICAL
Qty: 60 G | Refills: 0 | Status: SHIPPED | OUTPATIENT
Start: 2025-01-15

## 2025-01-15 RX ORDER — NYSTATIN 100000 [USP'U]/G
POWDER TOPICAL
Qty: 60 G | Refills: 0 | OUTPATIENT
Start: 2025-01-15

## 2025-01-17 ENCOUNTER — PREP FOR PROCEDURE (OUTPATIENT)
Dept: SURGERY | Facility: CLINIC | Age: 65
End: 2025-01-17

## 2025-01-17 ENCOUNTER — PRE VISIT (OUTPATIENT)
Dept: ONCOLOGY | Facility: CLINIC | Age: 65
End: 2025-01-17
Payer: COMMERCIAL

## 2025-01-17 ENCOUNTER — TELEPHONE (OUTPATIENT)
Dept: FAMILY MEDICINE | Facility: CLINIC | Age: 65
End: 2025-01-17

## 2025-01-17 PROBLEM — C50.911 INVASIVE DUCTAL CARCINOMA OF BREAST, FEMALE, RIGHT (H): Status: ACTIVE | Noted: 2025-01-17

## 2025-01-17 NOTE — TELEPHONE ENCOUNTER
Renae team,    S-(situation): Patient facility RN calling to schedule urgent preop    B-(background): patient recently diagnosed with breast cancer and is now needing surgery    A-(assessment): patient scheduled for a lumpectomy on left breast on 1/27    R-(recommendations): Please assist with scheduling patient for preop appointment prior to 01/27    Coordinate scheduling with [place]:  #[phone]    Thanks,  Callie RUIZ RN

## 2025-01-21 ENCOUNTER — OFFICE VISIT (OUTPATIENT)
Dept: PHARMACY | Facility: CLINIC | Age: 65
End: 2025-01-21
Payer: COMMERCIAL

## 2025-01-21 ENCOUNTER — TELEPHONE (OUTPATIENT)
Dept: SURGERY | Facility: CLINIC | Age: 65
End: 2025-01-21

## 2025-01-21 ENCOUNTER — OFFICE VISIT (OUTPATIENT)
Dept: FAMILY MEDICINE | Facility: CLINIC | Age: 65
End: 2025-01-21
Attending: FAMILY MEDICINE
Payer: COMMERCIAL

## 2025-01-21 VITALS
HEIGHT: 61 IN | WEIGHT: 206.5 LBS | TEMPERATURE: 97.5 F | HEART RATE: 73 BPM | BODY MASS INDEX: 38.99 KG/M2 | SYSTOLIC BLOOD PRESSURE: 135 MMHG | RESPIRATION RATE: 16 BRPM | OXYGEN SATURATION: 97 % | DIASTOLIC BLOOD PRESSURE: 83 MMHG

## 2025-01-21 DIAGNOSIS — F25.1 SCHIZOAFFECTIVE DISORDER, DEPRESSIVE TYPE (H): ICD-10-CM

## 2025-01-21 DIAGNOSIS — Z01.818 PREOP GENERAL PHYSICAL EXAM: Primary | ICD-10-CM

## 2025-01-21 DIAGNOSIS — Z79.4 TYPE 2 DIABETES MELLITUS WITH STAGE 3 CHRONIC KIDNEY DISEASE, WITH LONG-TERM CURRENT USE OF INSULIN, UNSPECIFIED WHETHER STAGE 3A OR 3B CKD (H): Primary | ICD-10-CM

## 2025-01-21 DIAGNOSIS — D64.9 ANEMIA, UNSPECIFIED TYPE: ICD-10-CM

## 2025-01-21 DIAGNOSIS — I10 HTN, GOAL BELOW 140/90: ICD-10-CM

## 2025-01-21 DIAGNOSIS — M54.50 CHRONIC RIGHT-SIDED LOW BACK PAIN WITHOUT SCIATICA: ICD-10-CM

## 2025-01-21 DIAGNOSIS — N18.31 TYPE 2 DIABETES MELLITUS WITH STAGE 3A CHRONIC KIDNEY DISEASE, WITH LONG-TERM CURRENT USE OF INSULIN (H): ICD-10-CM

## 2025-01-21 DIAGNOSIS — G89.29 CHRONIC RIGHT-SIDED LOW BACK PAIN WITHOUT SCIATICA: ICD-10-CM

## 2025-01-21 DIAGNOSIS — E11.22 TYPE 2 DIABETES MELLITUS WITH STAGE 3 CHRONIC KIDNEY DISEASE, WITH LONG-TERM CURRENT USE OF INSULIN, UNSPECIFIED WHETHER STAGE 3A OR 3B CKD (H): Primary | ICD-10-CM

## 2025-01-21 DIAGNOSIS — C50.411 MALIGNANT NEOPLASM OF UPPER-OUTER QUADRANT OF RIGHT BREAST IN FEMALE, ESTROGEN RECEPTOR POSITIVE (H): ICD-10-CM

## 2025-01-21 DIAGNOSIS — I10 ESSENTIAL HYPERTENSION WITH GOAL BLOOD PRESSURE LESS THAN 130/80: ICD-10-CM

## 2025-01-21 DIAGNOSIS — G47.33 OSA (OBSTRUCTIVE SLEEP APNEA): ICD-10-CM

## 2025-01-21 DIAGNOSIS — N39.46 MIXED STRESS AND URGE URINARY INCONTINENCE: ICD-10-CM

## 2025-01-21 DIAGNOSIS — Z79.899 MEDICATION MANAGEMENT: ICD-10-CM

## 2025-01-21 DIAGNOSIS — E78.5 HYPERLIPIDEMIA LDL GOAL <100: ICD-10-CM

## 2025-01-21 DIAGNOSIS — N18.30 TYPE 2 DIABETES MELLITUS WITH STAGE 3 CHRONIC KIDNEY DISEASE, WITH LONG-TERM CURRENT USE OF INSULIN, UNSPECIFIED WHETHER STAGE 3A OR 3B CKD (H): Primary | ICD-10-CM

## 2025-01-21 DIAGNOSIS — E11.22 TYPE 2 DIABETES MELLITUS WITH STAGE 3A CHRONIC KIDNEY DISEASE, WITH LONG-TERM CURRENT USE OF INSULIN (H): ICD-10-CM

## 2025-01-21 DIAGNOSIS — Z79.4 TYPE 2 DIABETES MELLITUS WITH STAGE 3A CHRONIC KIDNEY DISEASE, WITH LONG-TERM CURRENT USE OF INSULIN (H): ICD-10-CM

## 2025-01-21 DIAGNOSIS — C50.911 INVASIVE DUCTAL CARCINOMA OF BREAST, FEMALE, RIGHT (H): ICD-10-CM

## 2025-01-21 DIAGNOSIS — Z17.0 MALIGNANT NEOPLASM OF UPPER-OUTER QUADRANT OF RIGHT BREAST IN FEMALE, ESTROGEN RECEPTOR POSITIVE (H): ICD-10-CM

## 2025-01-21 LAB
ANION GAP SERPL CALCULATED.3IONS-SCNC: 14 MMOL/L (ref 7–15)
BASOPHILS # BLD AUTO: 0 10E3/UL (ref 0–0.2)
BASOPHILS NFR BLD AUTO: 0 %
BUN SERPL-MCNC: 19.5 MG/DL (ref 8–23)
CALCIUM SERPL-MCNC: 8.8 MG/DL (ref 8.8–10.4)
CHLORIDE SERPL-SCNC: 104 MMOL/L (ref 98–107)
CREAT SERPL-MCNC: 0.92 MG/DL (ref 0.51–0.95)
EGFRCR SERPLBLD CKD-EPI 2021: 70 ML/MIN/1.73M2
EOSINOPHIL # BLD AUTO: 0.1 10E3/UL (ref 0–0.7)
EOSINOPHIL NFR BLD AUTO: 1 %
ERYTHROCYTE [DISTWIDTH] IN BLOOD BY AUTOMATED COUNT: 12.8 % (ref 10–15)
GLUCOSE SERPL-MCNC: 131 MG/DL (ref 70–99)
HCO3 SERPL-SCNC: 24 MMOL/L (ref 22–29)
HCT VFR BLD AUTO: 30 % (ref 35–47)
HGB BLD-MCNC: 9.7 G/DL (ref 11.7–15.7)
IMM GRANULOCYTES # BLD: 0 10E3/UL
IMM GRANULOCYTES NFR BLD: 0 %
LYMPHOCYTES # BLD AUTO: 1.8 10E3/UL (ref 0.8–5.3)
LYMPHOCYTES NFR BLD AUTO: 36 %
MCH RBC QN AUTO: 31.3 PG (ref 26.5–33)
MCHC RBC AUTO-ENTMCNC: 32.3 G/DL (ref 31.5–36.5)
MCV RBC AUTO: 97 FL (ref 78–100)
MONOCYTES # BLD AUTO: 0.4 10E3/UL (ref 0–1.3)
MONOCYTES NFR BLD AUTO: 9 %
NEUTROPHILS # BLD AUTO: 2.6 10E3/UL (ref 1.6–8.3)
NEUTROPHILS NFR BLD AUTO: 53 %
PLATELET # BLD AUTO: 211 10E3/UL (ref 150–450)
POTASSIUM SERPL-SCNC: 4.5 MMOL/L (ref 3.4–5.3)
RBC # BLD AUTO: 3.1 10E6/UL (ref 3.8–5.2)
SODIUM SERPL-SCNC: 142 MMOL/L (ref 135–145)
WBC # BLD AUTO: 4.8 10E3/UL (ref 4–11)

## 2025-01-21 PROCEDURE — 99214 OFFICE O/P EST MOD 30 MIN: CPT | Performed by: FAMILY MEDICINE

## 2025-01-21 PROCEDURE — 99605 MTMS BY PHARM NP 15 MIN: CPT | Performed by: PHARMACIST

## 2025-01-21 PROCEDURE — 80048 BASIC METABOLIC PNL TOTAL CA: CPT | Performed by: FAMILY MEDICINE

## 2025-01-21 PROCEDURE — 85025 COMPLETE CBC W/AUTO DIFF WBC: CPT | Performed by: FAMILY MEDICINE

## 2025-01-21 PROCEDURE — 36415 COLL VENOUS BLD VENIPUNCTURE: CPT | Performed by: FAMILY MEDICINE

## 2025-01-21 PROCEDURE — G2211 COMPLEX E/M VISIT ADD ON: HCPCS | Performed by: FAMILY MEDICINE

## 2025-01-21 PROCEDURE — 93000 ELECTROCARDIOGRAM COMPLETE: CPT | Performed by: FAMILY MEDICINE

## 2025-01-21 PROCEDURE — 99607 MTMS BY PHARM ADDL 15 MIN: CPT | Performed by: PHARMACIST

## 2025-01-21 ASSESSMENT — PAIN SCALES - GENERAL: PAINLEVEL_OUTOF10: MODERATE PAIN (5)

## 2025-01-21 ASSESSMENT — PATIENT HEALTH QUESTIONNAIRE - PHQ9
10. IF YOU CHECKED OFF ANY PROBLEMS, HOW DIFFICULT HAVE THESE PROBLEMS MADE IT FOR YOU TO DO YOUR WORK, TAKE CARE OF THINGS AT HOME, OR GET ALONG WITH OTHER PEOPLE: NOT DIFFICULT AT ALL
SUM OF ALL RESPONSES TO PHQ QUESTIONS 1-9: 6
SUM OF ALL RESPONSES TO PHQ QUESTIONS 1-9: 6

## 2025-01-21 NOTE — PROGRESS NOTES
Medication Therapy Management (MTM) Encounter    ASSESSMENT:                            Medication Adherence/Access: No issues identified.    Breast cancer:   Follow-up plan in place with oncology    Diabetes  Stable. A1c at goal <8%.   Patient is meeting goal of > 50% time in target with continuous glucose monitoring. Infrequent hypoglycemia, will monitor.     Hypertension  Stable    Hyperlipidemia  Stable    Mental health  stable    PLAN:                            No medication changes    Follow-up: 6 weeks    SUBJECTIVE/OBJECTIVE:                          Nena Tang is a 64 year old female seen for a follow-up visit. Today's visit is a co-visit with Albino Rainey MD. Patient was accompanied by group home staff.      Reason for visit: medication recheck.    Allergies/ADRs: Reviewed in chart  Past Medical History: Reviewed in chart  Tobacco: She reports that she has never smoked. She has never used smokeless tobacco.  Alcohol: not currently using    Medication Adherence/Access: no issues reported.  Patient has assistance with medication administration: group home.    Breast cancer  New diagnosis.   Preop today for surgical procedure, see PCP notes.    Diabetes   Lantus 22 units once daily  Humalog sliding scale 2 units per 50mg/dL starting glucose 151  Mounjaro 7.5mg weekly   Side effects: Diarrhea x2 days after she takes Mounjaro (fiber has been helping, still take loperamide as needed)    Current diabetes symptoms: none  Diet/Exercise: lower appetite with Mounjaro.        Blood sugar monitoring: Continuous Glucose Monitor see AGP below      Eye exam is up to date  Foot exam: due    Hypertension   Amlodipine 10mg daily  Metoprolol XL 50mg AM and 100mg PM  Losartan 100mg daily  Patient reports no current medication side effects.   Patient has blood pressure monitored by group home.  Home BP monitoring :  120s/70-80s. Ranges systolic 108-145         Hyperlipidemia     atorvastatin 80mg daily  Patient  "reports no significant myalgias or other side effects.       Mental Health   Schizoaffective:  Quetiapine 100mg at bedtime   Paliperidone ER 12mg at bedtime  Sertraline 100mg daily  Bupropion XL 150mg daily   Hydroxyzine 25mg in the evening   Trazodone 100mg at bedtime  Clonazepam 0.5mg at bedtime  Prazosin 1mg at bedtime  Amantadine 100mg daily     Patient reports no mood concerns today.  Patient is seeing a psychiatrist.             Today's Vitals: LMP 01/06/2015 (Exact Date)   BP Readings from Last 1 Encounters:   01/21/25 135/83     Pulse Readings from Last 1 Encounters:   01/21/25 73     Wt Readings from Last 1 Encounters:   01/21/25 206 lb 8 oz (93.7 kg)     Ht Readings from Last 1 Encounters:   01/21/25 5' 0.79\" (1.544 m)     Estimated body mass index is 39.29 kg/m  as calculated from the following:    Height as of an earlier encounter on 1/21/25: 5' 0.79\" (1.544 m).    Weight as of an earlier encounter on 1/21/25: 206 lb 8 oz (93.7 kg).    Temp Readings from Last 1 Encounters:   01/21/25 97.5  F (36.4  C) (Oral)      ----------------      I spent 30 minutes with this patient today. All changes were made via collaborative practice agreement with Albino Rainey MD.     A summary of these recommendations was given to the patient (see AVS from today's appointment with PCP).    Eugenia De Jesus, PharmD, BCACP   Medication Therapy Management Pharmacist   [facility]  [phone]          Medication Therapy Recommendations  No medication therapy recommendations to display   "

## 2025-01-21 NOTE — LETTER
_  Medication List        Prepared on: Jan 21, 2025     Bring your Medication List when you go to the doctor, hospital, or   emergency room. And, share it with your family or caregivers.     Note any changes to how you take your medications.  Cross out medications when you no longer use them.    Medication How I take it Why I use it Prescriber   acetaminophen (TYLENOL) 500 MG tablet Take 2 tablets (1,000 mg) by mouth 3 times daily. Hip pain, right Albino Rainey MD   albuterol (PROAIR HFA/PROVENTIL HFA/VENTOLIN HFA) 108 (90 Base) MCG/ACT inhaler Inhale 2 puffs into the lungs every 6 hours as needed for shortness of breath, wheezing or cough Shortness of Breath Albino Rainey MD   Alcohol Swabs (EASY TOUCH ALCOHOL PREP MEDIUM) 70 % PADS APPLY 1 UNITS TOPICALLY 8 TIMES DAILY Type 2 diabetes mellitus with mild nonproliferative retinopathy without macular edema, with long-term current use of insulin, unspecified laterality (H) Albino Rainey MD   alendronate (FOSAMAX) 70 MG tablet TAKE 1 TABLET BY MOUTH EVERY 7 DAYS Osteoporosis without current pathological fracture, unspecified osteoporosis type Albino Rainey MD   amantadine (SYMMETREL) 100 MG capsule TAKE 1 CAPSULE BY MOUTH DAILY Neuroleptic-Induced Tardive Dyskinesia Albino Rainey MD   amLODIPine (NORVASC) 10 MG tablet Take 1 tablet (10 mg) by mouth every morning. Essential hypertension with goal blood pressure less than 130/80 Albino Rainey MD   ASPIRIN LOW DOSE 81 MG EC tablet TAKE 1 TABLET BY MOUTH DAILY Coronary artery disease involving native heart with angina pectoris, unspecified vessel or lesion type Albino Rainey MD   atorvastatin (LIPITOR) 80 MG tablet TAKE 1 TABLET BY MOUTH DAILY Hyperlipidemia LDL Goal <100 Albino Rainey MD   blood glucose (NO BRAND SPECIFIED) test strip Use to test blood sugar 10 times daily or as directed. Type 2 diabetes mellitus with stage 3  chronic kidney disease, with long-term current use of insulin, unspecified whether stage 3a or 3b CKD (H) Albino Rainey MD   buPROPion (WELLBUTRIN XL) 150 MG 24 hr tablet Take 1 tablet (150 mg) by mouth every morning. Severe episode of recurrent major depressive disorder, without psychotic features (H) Albino Rainey MD   calcium carbonate (OS-ALLEN) 1500 (600 Ca) MG tablet TAKE 1 TABLET BY MOUTH DAILY Osteoporosis without current pathological fracture, unspecified osteoporosis type Albino Rainey MD   calcium polycarbophil (FIBERCON) 625 MG tablet Take 2 tablets (1,250 mg) by mouth twice a week. Take (Wednesday) with Ozempic and (Thursday) day after Ozempic.  Discontinue if diarrhea WORSE.  Consoder also giving Tuesday, day before if reduces loose stool, but not resolved. Loose Stools Chanell Duque PA-C   chlorhexidine (PERIDEX) 0.12 % solution Swish and spit 10 mLs in mouth 2 times daily Pain, dental Albino Rainey MD   clonazePAM (KLONOPIN) 0.5 MG tablet Take 1 tablet (0.5 mg) by mouth at bedtime. Schizoaffective Disorder, Depressive Type (H); Psychophysiological Insomnia Albino Rainey MD   Continuous Glucose Sensor (DEXCOM G6 SENSOR) MISC Change every 10 days. Type 2 diabetes mellitus with mild nonproliferative retinopathy without macular edema, with long-term current use of insulin, unspecified laterality (H) Chanell Duque PA-C   Continuous Glucose Transmitter (DEXCOM G6 TRANSMITTER) MISC Change every 3 months. Type 2 diabetes mellitus with mild nonproliferative retinopathy without macular edema, with long-term current use of insulin, unspecified laterality (H) Chanell Duque PA-C   diclofenac (VOLTAREN) 1 % topical gel APPLY 4 GRAMS TO PAINFUL AREA AT BEDTIME . MAY USE AN ADDITIONAL 3 DOSES DURING Rib pain on right side Albino Rainey MD   gabapentin (NEURONTIN) 300 MG capsule TAKE 1 CAPSULE BY MOUTH AT BEDTIME Schizoaffective Disorder,  Depressive Type (H); Chronic right-sided low back pain without sciatica Albino Rainey MD   HUMALOG KWIKPEN 100 UNIT/ML soln Inject 0-12 Units subcutaneously 4 times daily (with meals and nightly) Before meals: BG 81 - 150:  0 units 151 - 200: 2 units, 201 - 250: 4 units, 251-300: 6 units, 301 - 350: 8 units, 351 or greater : 10 units At BEDTIME-If >4 hours since last Humalog injection For  - 249 give 1 units, 250 - 299 give 2 units,300 - 349 give 3 units, = or > 350 give 4 units. Type 2 diabetes mellitus with stage 3 chronic kidney disease, with long-term current use of insulin, unspecified whether stage 3a or 3b CKD (H) Chanell Duque PA-C   hydrOXYzine (VISTARIL) 25 MG capsule TAKE 1 CAPSULE BY MOUTH EVERY NIGHT AT BEDTIME ANXIETY/SLEEP Psychophysiological Insomnia Albino Rainey MD   ibuprofen (ADVIL/MOTRIN) 200 MG tablet Take 200 mg by mouth every 4 hours as needed for pain  pain Albino Rainey MD   insulin glargine (LANTUS SOLOSTAR) 100 UNIT/ML pen Inject 22 Units subcutaneously daily. Type 2 diabetes mellitus with stage 3 chronic kidney disease, with long-term current use of insulin, unspecified whether stage 3a or 3b CKD (H) Chanell Duque PA-C   insulin pen needle (BD AUTOSHIELD DUO) 30G X 5 MM USE 6 DAILY OR AS DIRECTED Type 2 diabetes mellitus with mild nonproliferative retinopathy without macular edema, with long-term current use of insulin, unspecified laterality (H) Albino Rainey MD   Lancets (ONETOUCH DELICA PLUS [id number]) MISC 1 EACH AS NEEDED  USE TO TEST BLOOD SUGARS 1-2 TIMES DAILY AS DIRECTED Type 2 diabetes mellitus with mild nonproliferative retinopathy without macular edema, with long-term current use of insulin, unspecified laterality (H) Albino Rainey MD   leflunomide (ARAVA) 20 MG tablet TAKE 1 TABLET BY MOUTH DAILY *LABS EVERY 8-12 WEEKS Polyarthralgia Alexus Posey MD   Lidocaine (LIDOCAINE PAIN RELIEF) 4 % Patch APPLY 1  PATCH ONTO SKIN EVERY 24 HOURS ; APPLY AT NIGHT AND REMOVE IN THE MORNING ( TWELVE HOURS PATCH FREE) Cervicalgia Albino Rainey MD   loperamide (IMODIUM) 2 MG capsule TAKE 1 CAPSULE BY MOUTH FOUR TIMES A DAY AS NEEDED FOR DIARRHEA Diarrhea, unspecified type Albino Rainey MD   losartan (COZAAR) 100 MG tablet TAKE 1 TABLET BY MOUTH DAILY Coronary artery disease involving native heart with angina pectoris, unspecified vessel or lesion type Albino Rainey MD   Magnesium Oxide 250 MG TABS Take 1 tablet (250 mg) by mouth at bedtime. Constipation, unspecified constipation type Alibno Rainey MD   melatonin 5 MG tablet Take 5 mg by mouth at bedtime  General Health  Patient Reported   metoprolol succinate ER (TOPROL XL) 50 MG 24 hr tablet TAKE 1 TABLET BY MOUTH IN THE MORNING AND TAKE 2 TABLETS AT BEDTIME HTN, goal below 140/90 Albino Rainey MD   mirabegron (MYRBETRIQ) 50 MG 24 hr tablet Take 1 tablet (50 mg) by mouth daily. Urinary Frequency Gisselle Nash PA-C   naproxen (NAPROSYN) 375 MG tablet Take 1 tablet (375 mg) by mouth 2 times daily as needed for moderate pain  Pain Albino Rainey MD   NYAMYC [id number] UNIT/GM external powder APPLY TOPICALLY TWICE A DAY AS NEEDED Rash and Nonspecific Skin Eruption Albino Rainey MD   ondansetron (ZOFRAN) 4 MG tablet TAKE 1 TABLET BY MOUTH EVERY 8 HOURS AS NEEDED FOR NAUSEA Nausea Albino Rainey MD   paliperidone ER (INVEGA) 6 MG 24 hr tablet TAKE 2 TABLETS BY MOUTH EVERY NIGHT AT BEDTIME Schizoaffective disorder, unspecified type (H); History of suicidal behavior; Verbal Auditory Hallucination Poli Villatoro    pantoprazole (PROTONIX) 40 MG EC tablet TAKE 1 TABLET (40 MG) BY MOUTH DAILY Gastroesophageal Reflux Disease without Esophagitis Albino Rainey MD   prazosin (MINIPRESS) 1 MG capsule Take 1 mg by mouth at bedtime  PTSD Poli Villatoro    QUEtiapine (SEROQUEL) 100 MG tablet Take 100  mg by mouth at bedtime  Schizoaffective Poli Villatoro    senna-docusate (SENOKOT S) 8.6-50 MG tablet Take 1 tablet by mouth daily as needed for constipation. Constipation, unspecified constipation type Albino Rainey MD   sertraline (ZOLOFT) 100 MG tablet Take 100 mg by mouth daily  Schizoaffective Poli Villatoro    Tirzepatide 7.5 MG/0.5ML SOAJ Inject 0.5 mLs (7.5 mg) subcutaneously every 7 days. Type 2 diabetes mellitus with stage 3 chronic kidney disease, with long-term current use of insulin, unspecified whether stage 3a or 3b CKD (H); Morbid Obesity (H) Chanell Duque PA-C   traZODone (DESYREL) 100 MG tablet TAKE 1 TABLET BY MOUTH AT BEDTIME Schizoaffective Disorder, Depressive Type (H) Albino Rainey MD   vitamin C (ASCORBIC ACID) 500 MG tablet TAKE 2 TABLETS BY MOUTH IN THE MORNING AND TAKE 2 TABLETS BY MOUTH IN THE EVENING Dysuria Gisselle Nash PA-C   Vitamin D, Cholecalciferol, 25 MCG (1000 UT) CAPS Take 2 capsules by mouth daily. Vitamin D Deficiency Albino Rainey MD   zinc oxide (DESITIN) 20 % external ointment Apply topically 3 times daily as needed for irritation (barrier cream) Skin Irritation ART Arias CNP         Add new medications, over-the-counter drugs, herbals, vitamins, or  minerals in the blank rows below.    Medication How I take it Why I use it Prescriber                                      Allergies:      - Imidazole Antifungals - Hives  - Ketoprofen - Itching  - Lisinopril - Hives  - Metformin - Other (See Comments)  - Metronidazole - Hives  - Penicillins  - Posaconazole - Hives        Side effects I have had:      Not on File        Other Information:              My notes and questions:

## 2025-01-21 NOTE — LETTER
"Recommended To-Do List      Prepared on: Jan 21, 2025       You can get the best results from your medications by completing the items on this \"To-Do List.\"      Bring your To-Do List when you go to your doctor. And, share it with your family or caregivers.    My To-Do List:  What we talked about: What I should do:    What my medicines are for, how to know if my medicines are working, made sure my medicines are safe for me and reviewed how to take my medicines.     Take my medicines every day               "

## 2025-01-21 NOTE — TELEPHONE ENCOUNTER
Type of surgery: RFID tag localized right partial mastectomy, RFID tag localized right axillary lymph node excision, right SLN biopsy  Location of surgery: [facility]  Date and time of surgery: 1/27/25 7:30am  Surgeon: Dr Giron  Pre-Op Appt Date: pt to schedule  Post-Op Appt Date: pt to schedule   Packet sent out: Yes  Pre-cert/Authorization completed:  Not Applicable  Date: 1/17/25

## 2025-01-21 NOTE — PROGRESS NOTES
"  See preop note for assessment of issues that were addressed today.    Surjit Barrett is a 64 year old, presenting for the following health issues:  Follow Up        1/21/2025    10:25 AM   Additional Questions   Roomed by Mary RAMOS   Accompanied by Uyen - Supervisor         1/21/2025   Forms   Any forms needing to be completed Yes     Via the Health Maintenance questionnaire, the patient has reported the following services have been completed , this information has been sent to the abstraction team.  History of Present Illness       Reason for visit:  A check up and let the doctor know about my health    She eats 0-1 servings of fruits and vegetables daily.She consumes 2 sweetened beverage(s) daily.She exercises with enough effort to increase her heart rate 9 or less minutes per day.  She exercises with enough effort to increase her heart rate 3 or less days per week.   She is taking medications regularly.           Patient was seen today for previously scheduled visit and checkup.  In the interim since my last visit with her she was diagnosed with right sided breast cancer metastatic to at least 1 lymph node.  The breast cancer is estrogen and progesterone receptor positive.  She is scheduled for surgery next week so we elected to complete the preoperative evaluation today.      Review of Systems  Constitutional, HEENT, cardiovascular, pulmonary, gi and gu systems are negative, except as otherwise noted.      Objective    /83 (BP Location: Left arm, Patient Position: Sitting, Cuff Size: Adult Large)   Pulse 73   Temp 97.5  F (36.4  C) (Oral)   Resp 16   Ht 1.544 m (5' 0.79\")   Wt 93.7 kg (206 lb 8 oz)   LMP 01/06/2015 (Exact Date)   SpO2 97%   BMI 39.29 kg/m    Body mass index is 39.29 kg/m .  Physical Exam               Signed Electronically by: Albino Rainey MD    "

## 2025-01-21 NOTE — LETTER
January 21, 2025  Nena Tang  [address] [address]    Dear Ms. Tang, [facility]     Thank you for talking with me on Jan 21, 2025 about your health and medications. As a follow-up to our conversation, I have included two documents:      Your Recommended To-Do List has steps you should take to get the best results from your medications.  Your Medication List will help you keep track of your medications and how to take them.    If you want to talk about these documents, please call Eugenia De Jesus RPH at phone: [phone], Monday-Friday 8-4:30pm.    I look forward to working with you and your doctors to make sure your medications work well for you.    Sincerely,  Eugenia De Jesus RPH  [facility] Pharmacist, [facility]

## 2025-01-21 NOTE — PATIENT INSTRUCTIONS
How to Take Your Medication Before Surgery  Preoperative Medication Instructions   Antiplatelet or Anticoagulation Medication Instructions   - aspirin: Discontinue aspirin 7 days prior to procedure to reduce bleeding risk. It should be resumed postoperatively.     Additional Medication Instructions  Take all scheduled medications on the day of surgery EXCEPT for modifications listed below:   - Long acting insulin (e.g. glargine, detemir): Take 50% of the usual evening or morning dose before surgery.     - short acting insulin (e.g. regular, lispro, aspart): DO NOT TAKE on the morning of surgery.    - GLP-1 Injectable (exenitide, liraglutide, semaglutide, dulaglutide, etc.): DO NOT TAKE 7 days before surgery    - bisphosphonates (alendronate, ibandronate, risedronate):  Hold on 1/22, may restart Wednesday after surgery   - naproxen (Aleve, Naprosyn): DO NOT TAKE 4 days before surgery.    - SSRIs, SNRIs, TCAs, Antipsychotics: Continue without modification.        Patient Education   Preparing for Your Surgery  For Adults  Getting started  In most cases, a nurse will call to review your health history and instructions. They will give you an arrival time based on your scheduled surgery time. Please be ready to share:  Your doctor's clinic name and phone number  Your medical, surgical, and anesthesia history  A list of allergies and sensitivities  A list of medicines, including herbal treatments and over-the-counter drugs  Whether the patient has a legal guardian (ask how to send us the papers in advance)  Note: You may not receive a call if you were seen at our PAC (Preoperative Assessment Center).  Please tell us if you're pregnant--or if there's any chance you might be pregnant. Some surgeries may injure a fetus (unborn baby), so they require a pregnancy test. Surgeries that are safe for a fetus don't always need a test, and you can choose whether to have one.   Preparing for surgery  Within 10 to 30 days of surgery:  Have a pre-op exam (sometimes called an H&P, or History and Physical). This can be done at a clinic or pre-operative center.  If you're having a , you may not need this exam. Talk to your care team.  At your pre-op exam, talk to your care team about all medicines you take. (This includes CBD oil and any drugs, such as THC, marijuana, and other forms of cannabis.) If you need to stop any medicine before surgery, ask when to start taking it again.  This is for your safety. Many medicines and drugs can make you bleed too much during surgery. Some change how well surgery (anesthesia) drugs work.  Call your insurance company to let them know you're having surgery. (If you don't have insurance, call [phone].)  Call your clinic if there's any change in your health. This includes a scrape or scratch near the surgery site, or any signs of a cold (sore throat, runny nose, cough, rash, fever).  Eating and drinking guidelines  For your safety: Unless your surgeon tells you otherwise, follow the guidelines below.  Eat and drink as normal until 8 hours before you arrive for surgery. After that, no food or milk. You can spit out gum when you arrive.  Drink clear liquids until 2 hours before you arrive. These are liquids you can see through, like water, Gatorade, and Propel Water. They also include plain black coffee and tea (no cream or milk).  No alcohol for 24 hours before you arrive. The night before surgery, stop any drinks that contain THC.  If your care team tells you to take medicine on the morning of surgery, it's okay to take it with a sip of water. No other medicines or drugs are allowed (including CBD oil)--follow your care team's instructions.  If you have questions the day of surgery, call your hospital or surgery center.   Preventing infection  Shower or bathe the night before and the morning of surgery. Follow the instructions your clinic gave you. (If no instructions, use regular soap.)  Don't shave  or clip hair near your surgery site. We'll remove the hair if needed.  Don't smoke or vape the morning of surgery. No chewing tobacco for 6 hours before you arrive. A nicotine patch is okay. You may spit out nicotine gum when you arrive.  For some surgeries, the surgeon will tell you to fully quit smoking and nicotine.  We will make every effort to keep you safe from infection. We will:  Clean our hands often with soap and water (or an alcohol-based hand rub).  Clean the skin at your surgery site with a special soap that kills germs.  Give you a special gown to keep you warm. (Cold raises the risk of infection.)  Wear hair covers, masks, gowns, and gloves during surgery.  Give antibiotic medicine, if prescribed. Not all surgeries need this medicine.  What to bring on the day of surgery  Photo ID and insurance card  Copy of your health care directive, if you have one  Glasses and hearing aids (bring cases)  You can't wear contacts during surgery  Inhaler and eye drops, if you use them (tell us about these when you arrive)  CPAP machine or breathing device, if you use them  A few personal items, if spending the night  If you have . . .  A pacemaker, ICD (cardiac defibrillator), or other implant: Bring the ID card.  An implanted stimulator: Bring the remote control.  A legal guardian: Bring a copy of the certified (court-stamped) guardianship papers.  Please remove any jewelry, including body piercings. Leave jewelry and other valuables at home.  If you're going home the day of surgery  You must have a responsible adult drive you home. They should stay with you overnight as well.  If you don't have someone to stay with you, and you aren't safe to go home alone, we may keep you overnight. Insurance often won't pay for this.  After surgery  If it's hard to control your pain or you need more pain medicine, please call your surgeon's office.  Questions?   If you have any questions for your care team, list them here:    ____________________________________________________________________________________________________________________________________________________________________________________________________________________________________________________________  For informational purposes only. Not to replace the advice of your health care provider. Copyright   2003, 2019 [facility]. All rights reserved. Clinically reviewed by Walter Chin MD. [employer] [id number] - REV 08/24.

## 2025-01-23 NOTE — PROGRESS NOTES
Preoperative Evaluation  [facility]  [address]  [address]  [facility] [address]  Phone: [phone]  Fax: [phone]  Primary Provider: Albino Rainey MD  Pre-op Performing Provider: Albino Rainey MD  Jan 21, 2025 1/21/2025   Surgical Information   What procedure is being done? right partial mastectomy    Facility or Hospital where procedure/surgery will be performed: [facility]    Who is doing the procedure / surgery? [facility]    Date of surgery / procedure: 1/27/25    Time of surgery / procedure: TBD    Where do you plan to recover after surgery? at home with Home Care        Proxy-reported     Fax number for surgical facility: Note does not need to be faxed, will be available electronically in Epic.    Assessment & Plan     The proposed surgical procedure is considered LOW risk.    Preop general physical exam  Patient is deemed acceptable for anesthesia with no further testing at this time.  - EKG 12-lead complete w/read - Clinics    Malignant neoplasm of upper-outer quadrant of right breast in female, estrogen receptor positive (H)  Per oncology and breast surgery.    Medication management  Patient was seen in conjunction with [facility] pharmacy today.    Chronic right-sided low back pain without sciatica  Chronic back pain is stable at this time.    Essential hypertension with goal blood pressure less than 130/80  Blood pressure was under control and will check BMP in preparation for surgery.  - Basic metabolic panel  (Ca, Cl, CO2, Creat, Gluc, K, Na, BUN); Future  - Basic metabolic panel  (Ca, Cl, CO2, Creat, Gluc, K, Na, BUN)    Mixed stress and urge urinary incontinence  May continue this medication prior to surgery.    CINDY (obstructive sleep apnea)- mild AHI 10.3  Currently stable.    Schizoaffective disorder, depressive type (H)  Mood is stable at this time and no changes were made to medications.  No modification anticipated prior to surgery.    Type 2  diabetes mellitus with stage 3a chronic kidney disease, with long-term current use of insulin (H)  Modifications were given to the patient regarding current medication regimen including holding tirzepatide beginning tomorrow and appropriate insulin dosing prior to the procedure.  - Med Therapy Management Referral  - REVIEW OF HEALTH MAINTENANCE PROTOCOL ORDERS    Anemia, unspecified type  Will recheck CBC at this time.  Anemia has been chronic and stable but of unclear etiology.  - CBC with platelets and differential; Future  - CBC with platelets and differential       Implanted Device   - Type of device: Continuous glucose monitor Patient advised to bring device information on day of surgery.      Risks and Recommendations  The patient has the following additional risks and recommendations for perioperative complications:   - Consult Hospitalist / IM to assist with post-op medical management  Diabetes:  - Patient is on insulin therapy; diabetic NPO guidelines provided and discussed.    Preoperative Medication Instructions  Antiplatelet or Anticoagulation Medication Instructions   - aspirin: Discontinue aspirin 7 days prior to procedure to reduce bleeding risk. It should be resumed postoperatively.     Additional Medication Instructions  Take all scheduled medications on the day of surgery EXCEPT for modifications listed below:   - Long acting insulin (e.g. glargine, detemir): Take 50% of the usual evening or morning dose before surgery.     - short acting insulin (e.g. regular, lispro, aspart): DO NOT TAKE on the morning of surgery.    - GLP-1 Injectable (exenitide, liraglutide, semaglutide, dulaglutide, etc.): DO NOT TAKE 7 days before surgery    - bisphosphonates (alendronate, ibandronate, risedronate):  Hold on 1/22, may restart Wednesday after surgery   - naproxen (Aleve, Naprosyn): DO NOT TAKE 4 days before surgery.    - SSRIs, SNRIs, TCAs, Antipsychotics: Continue without modification.      Recommendation  Approval given to proceed with proposed procedure, without further diagnostic evaluation.    Surjit Barrett is a 64 year old, presenting for the following:  Follow Up          1/21/2025    10:25 AM   Additional Questions   Roomed by Mary RAMOS   Accompanied by Uyen - Supervisor         1/21/2025   Forms   Any forms needing to be completed Yes     HPI related to upcoming procedure: Patient was recently diagnosed with breast cancer in the right breast metastatic to 1 lymph node.  She is consulted with surgery and oncology and is scheduled for a partial mastectomy and sentinel lymph node dissection next week.        1/21/2025   Pre-Op Questionnaire   Have you ever had a heart attack or stroke? No    Have you ever had surgery on your heart or blood vessels, such as a stent placement, a coronary artery bypass, or surgery on an artery in your head, neck, heart, or legs? No    Do you have chest pain with activity? No    Do you have a history of heart failure? No    Do you currently have a cold, bronchitis or symptoms of other infection? No    Do you have a cough, shortness of breath, or wheezing? No    Do you or anyone in your family have previous history of blood clots? No    Do you or does anyone in your family have a serious bleeding problem such as prolonged bleeding following surgeries or cuts? No    Have you ever had problems with anemia or been told to take iron pills? (!) YES chronic and stable   Have you had any abnormal blood loss such as black, tarry or bloody stools, or abnormal vaginal bleeding? No    Have you ever had a blood transfusion? No    Are you willing to have a blood transfusion if it is medically needed before, during, or after your surgery? Yes    Have you or any of your relatives ever had problems with anesthesia? No    Do you have sleep apnea, excessive snoring or daytime drowsiness? (!) YES    Do you have a CPAP machine? Yes    Do you have any artifical heart valves or  other implanted medical devices like a pacemaker, defibrillator, or continuous glucose monitor? (!) YES    What type of device do you have? CGM    Name of the clinic that manages your device [facility]    Do you have artificial joints? No    Are you allergic to latex? No        Proxy-reported     Health Care Directive  Patient does not have a Health Care Directive: Patient states has Advance Directive and will bring in a copy to clinic.    Preoperative Review of    reviewed - controlled substances reflected in medication list.      Status of Chronic Conditions:  See problem list for active medical problems.  Problems all longstanding and stable, except as noted/documented.  See ROS for pertinent symptoms related to these conditions.    Patient Active Problem List    Diagnosis Date Noted    CAD (coronary artery disease) 02/29/2012     Priority: High      Tako-Tsubo stress cardiomyopathy 2009. Mild coronary artery disease,       Invasive ductal carcinoma of breast, female, right (H) 01/17/2025     Priority: Medium    Glaucoma suspect of both eyes 06/13/2024     Priority: Medium    Aspiration pneumonia due to vomit (H) 01/04/2024     Priority: Medium    Schizoaffective disorder, unspecified type (H) 09/18/2023     Priority: Medium    DDD (degenerative disc disease), lumbosacral 09/09/2022     Priority: Medium    Impingement syndrome of shoulder region, right 09/09/2022     Priority: Medium    Localized edema 08/17/2022     Priority: Medium    Fatigue, unspecified type 08/17/2022     Priority: Medium    Vitamin D deficiency 08/17/2022     Priority: Medium    Has poorly balanced diet 06/21/2022     Priority: Medium    History of substance abuse (H) 06/21/2022     Priority: Medium    History of suicidal behavior 06/21/2022     Priority: Medium    Dehydration 09/25/2021     Priority: Medium    Hypoglycemia 09/25/2021     Priority: Medium    Diarrhea, unspecified type 09/25/2021     Priority: Medium    Chronic  pain syndrome 01/19/2021     Priority: Medium    Fibromyalgia 01/19/2021     Priority: Medium    Mixed stress and urge urinary incontinence 06/01/2020     Priority: Medium    Depression with suicidal ideation 12/21/2019     Priority: Medium    Hyperglycemia 05/15/2019     Priority: Medium    Positive AIDA (antinuclear antibody) 01/31/2019     Priority: Medium    Type 2 diabetes mellitus with stage 3a chronic kidney disease, with long-term current use of insulin (H) 06/05/2018     Priority: Medium    Posttraumatic stress disorder 01/29/2018     Priority: Medium    Gastroenteritis 12/08/2017     Priority: Medium    Thoughts of self harm 10/23/2017     Priority: Medium    Infectious encephalopathy 09/04/2017     Priority: Medium    Non-intractable vomiting with nausea 09/04/2017     Priority: Medium    Dysuria 06/12/2017     Priority: Medium    Psychophysiological insomnia 03/09/2017     Priority: Medium    History of uterine cancer 12/07/2016     Priority: Medium     Yearly pap's per the provider office visit note on 12/07/16.  05/22/18: She had a total hysterectomy 35 years ago for uterine cancer, NIL pap, Neg HR HPV result. Plan cease pap screening per provider.  11/1/19 NIL, Neg HPV. No further screening per provider.        Falls frequently 08/18/2016     Priority: Medium    Left cataract 07/25/2016     Priority: Medium    Tardive dyskinesia 09/11/2015     Priority: Medium    Nonproliferative diabetic retinopathy associated with diabetes mellitus due to underlying condition (H) 06/19/2014     Priority: Medium     Problem list name updated by automated process. Provider to review      Esophageal reflux 06/09/2014     Priority: Medium    Lumbago 09/20/2013     Priority: Medium    Cervicalgia 09/20/2013     Priority: Medium    Essential hypertension with goal blood pressure less than 130/80 07/29/2013     Priority: Medium    Migraine headache 04/22/2013     Priority: Medium    Schizoaffective disorder, depressive type  (H) 02/25/2013     Priority: Medium    Chronic low back pain 01/22/2013     Priority: Medium    Verbal auditory hallucination 10/04/2012     Priority: Medium    CINDY (obstructive sleep apnea)- mild AHI 10.3 03/08/2012     Priority: Medium     Sleep study 3/12 (198#)-   AHI 10.3, with significant desaturations down to 74%. RDI 10.3. Periodic Limb Movement Index 3.2/hour.        Type 2 diabetes mellitus with mild nonproliferative retinopathy (H) 08/30/2011     Priority: Medium    Illiterate 08/30/2011     Priority: Medium    Rotator cuff syndrome 07/06/2011     Priority: Medium    Hyperlipidemia LDL goal <100 10/31/2010     Priority: Medium    Restless legs syndrome (RLS) 02/29/2012     Priority: Low    Irritable bowel syndrome      Priority: Low    overweight - BMI >35      Priority: Low    Takotsubo cardiomyopathy      Priority: Low     2009. Echo 2010, angio 2011 with normal LVF.       Vitamin B12 deficiency without anemia 11/23/2009     Priority: Low     Diagnosis updated by automated process. Provider to review and confirm.      Osteopenia 10/07/2009     Priority: Low     Dexa 2009- Lumbar Spine (L1-L4): T-score -1.8, Left Femoral Neck: T-score -1.0, Right Femoral Neck: T-score -1.0             Past Medical History:   Diagnosis Date    Acute respiratory failure with hypoxia (H) 09/04/2017    CAD (coronary artery disease)     5/2014 cath, nonbostructive stenosis to LAD, RCA.    Chronic low back pain 01/22/2013    Cocaine abuse, in remission (H)     Fecal urgency 03/08/2012    History of heroin abuse (H)     Hyperlipidemia LDL goal <100 10/31/2010    Hypertension 07/29/2013    Illiterate 08/30/2011    Irritable bowel syndrome     Left cataract     Migraine 04/19/2012    Migraine headache 04/22/2013    Moderate major depression (H) 06/08/2011    Noncompliance with medication regimen 06/08/2011    Obesity     CINDY (obstructive sleep apnea) 03/08/2012    uses CPAP    CINDY (obstructive sleep apnea)- mild AHI 10.3      Osteopenia 10/07/2009    Pneumonia     Pneumonia of right lower lobe due to infectious organism 09/04/2017    Schizoaffective disorder, depressive type (H) 02/25/2013    Sepsis (H) 08/29/2017    Suicidal intent 10/02/2013    Takotsubo cardiomyopathy     Type 2 diabetes mellitus (H) 08/30/2011    Uterine cancer (H) 1983    Verbal auditory hallucination 10/04/2012     Past Surgical History:   Procedure Laterality Date    CATARACT IOL, RT/LT Bilateral 2017    CHOLECYSTECTOMY      COLONOSCOPY N/A 3/16/2017    Procedure: COLONOSCOPY;  Surgeon: Traci Gonzalez MD;  Location:  GI    Coronary CTA  5/21/2014    HYSTERECTOMY  1983    uterine cancer yearly pap's per provider.    HYSTERECTOMY      LAPAROSCOPIC CHOLECYSTECTOMY  2008    PHACOEMULSIFICATION CLEAR CORNEA WITH STANDARD INTRAOCULAR LENS IMPLANT Left 5/5/2017    Procedure: PHACOEMULSIFICATION CLEAR CORNEA WITH STANDARD INTRAOCULAR LENS IMPLANT;  LEFT EYE PHACOEMULSIFICATION CLEAR CORNEA WITH STANDARD INTRAOCULAR LENS IMPLANT ;  Surgeon: Tyra Diaz MD;  Location:  [facility]    PHACOEMULSIFICATION CLEAR CORNEA WITH STANDARD INTRAOCULAR LENS IMPLANT Right 6/30/2017    Procedure: PHACOEMULSIFICATION CLEAR CORNEA WITH STANDARD INTRAOCULAR LENS IMPLANT;  RIGHT EYE PHACOEMULSIFICATION CLEAR CORNEA WITH STANDARD INTRAOCULAR LENS IMPLANT;  Surgeon: Tyra Diaz MD;  Location:  [facility]    RELEASE TRIGGER FINGER  10/11/2012    Left thumb. Procedure: RELEASE TRIGGER FINGER;  LEFT THUMB TRIGGER RELEASE;  Surgeon: Tay Langley MD;  Location:  [facility]    RELEASE TRIGGER FINGER Right 12/26/2016    Procedure: RELEASE TRIGGER FINGER;  Surgeon: Albino Castañeda MD;  Location:  OR    [facility] OOPHORECTORMY FOR MALALEJO, W/BX  1983    UTERINE     Current Outpatient Medications   Medication Sig Dispense Refill    acetaminophen (TYLENOL) 500 MG tablet Take 2 tablets (1,000 mg) by mouth 3 times daily. 168 tablet 4    albuterol (PROAIR HFA/PROVENTIL HFA/VENTOLIN HFA) 108  (90 Base) MCG/ACT inhaler Inhale 2 puffs into the lungs every 6 hours as needed for shortness of breath, wheezing or cough 18 g 3    Alcohol Swabs (EASY TOUCH ALCOHOL PREP MEDIUM) 70 % PADS APPLY 1 UNITS TOPICALLY 8 TIMES DAILY 200 each 3    alendronate (FOSAMAX) 70 MG tablet TAKE 1 TABLET BY MOUTH EVERY 7 DAYS 4 tablet 11    amantadine (SYMMETREL) 100 MG capsule TAKE 1 CAPSULE BY MOUTH DAILY 28 capsule 11    amLODIPine (NORVASC) 10 MG tablet Take 1 tablet (10 mg) by mouth every morning. 30 tablet 11    ASPIRIN LOW DOSE 81 MG EC tablet TAKE 1 TABLET BY MOUTH DAILY 28 tablet 11    atorvastatin (LIPITOR) 80 MG tablet TAKE 1 TABLET BY MOUTH DAILY 90 tablet 2    blood glucose (NO BRAND SPECIFIED) test strip Use to test blood sugar 10 times daily or as directed. 100 strip 11    buPROPion (WELLBUTRIN XL) 150 MG 24 hr tablet Take 1 tablet (150 mg) by mouth every morning. 30 tablet 11    calcium carbonate (OS-ALLEN) 1500 (600 Ca) MG tablet TAKE 1 TABLET BY MOUTH DAILY 90 tablet 2    calcium polycarbophil (FIBERCON) 625 MG tablet Take 2 tablets (1,250 mg) by mouth twice a week. Take (Wednesday) with Ozempic and (Thursday) day after Ozempic.  Discontinue if diarrhea WORSE.  Consoder also giving Tuesday, day before if reduces loose stool, but not resolved. 30 tablet 3    chlorhexidine (PERIDEX) 0.12 % solution Swish and spit 10 mLs in mouth 2 times daily 1893 mL 3    clonazePAM (KLONOPIN) 0.5 MG tablet Take 1 tablet (0.5 mg) by mouth at bedtime. 28 tablet 5    Continuous Glucose Sensor (DEXCOM G6 SENSOR) MISC Change every 10 days. 9 each 4    Continuous Glucose Transmitter (DEXCOM G6 TRANSMITTER) MISC Change every 3 months. 1 each 4    diclofenac (VOLTAREN) 1 % topical gel APPLY 4 GRAMS TO PAINFUL AREA AT BEDTIME . MAY USE AN ADDITIONAL 3 DOSES DURING 100 g 1    gabapentin (NEURONTIN) 300 MG capsule TAKE 1 CAPSULE BY MOUTH AT BEDTIME 28 capsule 11    HUMALOG KWIKPEN 100 UNIT/ML soln Inject 0-12 Units subcutaneously 4 times daily  (with meals and nightly) Before meals: BG 81 - 150:  0 units 151 - 200: 2 units, 201 - 250: 4 units, 251-300: 6 units, 301 - 350: 8 units, 351 or greater : 10 units At BEDTIME-If >4 hours since last Humalog injection For  - 249 give 1 units, 250 - 299 give 2 units,300 - 349 give 3 units, = or > 350 give 4 units. 15 mL 1    hydrOXYzine (VISTARIL) 25 MG capsule TAKE 1 CAPSULE BY MOUTH EVERY NIGHT AT BEDTIME ANXIETY/SLEEP 28 capsule 0    ibuprofen (ADVIL/MOTRIN) 200 MG tablet Take 200 mg by mouth every 4 hours as needed for pain      insulin glargine (LANTUS SOLOSTAR) 100 UNIT/ML pen Inject 22 Units subcutaneously daily. 45 mL 1    insulin pen needle (BD AUTOSHIELD DUO) 30G X 5 MM USE 6 DAILY OR AS DIRECTED 200 each 10    Lancets (ONETOUCH DELICA PLUS UPWSIX56I) MISC 1 EACH AS NEEDED  USE TO TEST BLOOD SUGARS 1-2 TIMES DAILY AS DIRECTED 100 each 1    leflunomide (ARAVA) 20 MG tablet TAKE 1 TABLET BY MOUTH DAILY *LABS EVERY 8-12 WEEKS 30 tablet 1    Lidocaine (LIDOCAINE PAIN RELIEF) 4 % Patch APPLY 1 PATCH ONTO SKIN EVERY 24 HOURS ; APPLY AT NIGHT AND REMOVE IN THE MORNING ( TWELVE HOURS PATCH FREE) 30 patch 11    loperamide (IMODIUM) 2 MG capsule TAKE 1 CAPSULE BY MOUTH FOUR TIMES A DAY AS NEEDED FOR DIARRHEA 30 capsule 1    losartan (COZAAR) 100 MG tablet TAKE 1 TABLET BY MOUTH DAILY 28 tablet 11    Magnesium Oxide 250 MG TABS Take 1 tablet (250 mg) by mouth at bedtime. 28 tablet 11    melatonin 5 MG tablet Take 5 mg by mouth at bedtime      metoprolol succinate ER (TOPROL XL) 50 MG 24 hr tablet TAKE 1 TABLET BY MOUTH IN THE MORNING AND TAKE 2 TABLETS AT BEDTIME 84 tablet 11    mirabegron (MYRBETRIQ) 50 MG 24 hr tablet Take 1 tablet (50 mg) by mouth daily. 90 tablet 5    naproxen (NAPROSYN) 375 MG tablet Take 1 tablet (375 mg) by mouth 2 times daily as needed for moderate pain (denatl pain) 60 tablet 11    NYAMYC 525884 UNIT/GM external powder APPLY TOPICALLY TWICE A DAY AS NEEDED 60 g 0    ondansetron (ZOFRAN)  4 MG tablet TAKE 1 TABLET BY MOUTH EVERY 8 HOURS AS NEEDED FOR NAUSEA 10 tablet 1    order for DME Equipment being ordered: Depends. 30 each 4    order for DME Equipment being ordered: CPAP Supplies. 1 each 0    paliperidone ER (INVEGA) 6 MG 24 hr tablet TAKE 2 TABLETS BY MOUTH EVERY NIGHT AT BEDTIME 60 tablet 11    pantoprazole (PROTONIX) 40 MG EC tablet TAKE 1 TABLET (40 MG) BY MOUTH DAILY 28 tablet 11    prazosin (MINIPRESS) 1 MG capsule Take 1 mg by mouth at bedtime      QUEtiapine (SEROQUEL) 100 MG tablet Take 100 mg by mouth at bedtime      senna-docusate (SENOKOT S) 8.6-50 MG tablet Take 1 tablet by mouth daily as needed for constipation. 30 tablet 11    sertraline (ZOLOFT) 100 MG tablet Take 100 mg by mouth daily      Tirzepatide 7.5 MG/0.5ML SOAJ Inject 0.5 mLs (7.5 mg) subcutaneously every 7 days. 2 mL 11    traZODone (DESYREL) 100 MG tablet TAKE 1 TABLET BY MOUTH AT BEDTIME 28 tablet 11    vitamin C (ASCORBIC ACID) 500 MG tablet TAKE 2 TABLETS BY MOUTH IN THE MORNING AND TAKE 2 TABLETS BY MOUTH IN THE EVENING 180 tablet 1    Vitamin D, Cholecalciferol, 25 MCG (1000 UT) CAPS Take 2 capsules by mouth daily. 60 capsule 3    zinc oxide (DESITIN) 20 % external ointment Apply topically 3 times daily as needed for irritation (barrier cream) 60 g 3       Allergies   Allergen Reactions    Imidazole Antifungals Hives     Tolerates diflucan    Ketoprofen Itching     Pruritis to topical    Lisinopril Hives    Metformin Other (See Comments)     Patient hospitalized for lactic acidosis - admitting provider suspectd caused by metformin    Metronidazole Hives    Penicillins     Posaconazole Hives     Tolerates diflucan        Social History     Tobacco Use    Smoking status: Never    Smokeless tobacco: Never   Substance Use Topics    Alcohol use: Not Currently     Comment: when younger     Family History   Problem Relation Age of Onset    Cancer Mother         BLADDER    Respiratory Mother         COPD     "Gastrointestinal Disease Mother         CIRRHOSIS OF LI BOLIVAR    Alcohol/Drug Mother     Diabetes Mother     Hypertension Mother     Lipids Mother     C.A.D. Mother     Glaucoma Mother     Alcohol/Drug Sister     Mental Illness Sister     Alcohol/Drug Sister     Psychotic Disorder Sister     Cancer Maternal Grandmother         UNKNOWN TYPE    Cancer Brother         COLON    Cancer - colorectal Brother         IN HIS LATE 30S    Alcohol/Drug Brother          OF HEROIN OVERDOSE AT AGE 22 YRS    Macular Degeneration No family hx of      History   Drug Use No     Comment: history of             Review of Systems  CONSTITUTIONAL: NEGATIVE for fever, chills, change in weight  INTEGUMENTARY/SKIN: NEGATIVE for worrisome rashes, moles or lesions  EYES: NEGATIVE for vision changes or irritation  ENT/MOUTH: NEGATIVE for ear, mouth and throat problems  RESP: NEGATIVE for significant cough or SOB  BREAST: NEGATIVE for masses, tenderness or discharge  CV: NEGATIVE for chest pain, palpitations or peripheral edema  GI: NEGATIVE for nausea, abdominal pain, heartburn, or change in bowel habits  : NEGATIVE for frequency, dysuria, or hematuria  MUSCULOSKELETAL: NEGATIVE for significant arthralgias or myalgia  NEURO: NEGATIVE for weakness, dizziness or paresthesias  ENDOCRINE: NEGATIVE for temperature intolerance, skin/hair changes  HEME: NEGATIVE for bleeding problems  PSYCHIATRIC: NEGATIVE for changes in mood or affect    Objective    /83 (BP Location: Left arm, Patient Position: Sitting, Cuff Size: Adult Large)   Pulse 73   Temp 97.5  F (36.4  C) (Oral)   Resp 16   Ht 1.544 m (5' 0.79\")   Wt 93.7 kg (206 lb 8 oz)   LMP 2015 (Exact Date)   SpO2 97%   BMI 39.29 kg/m     Estimated body mass index is 39.29 kg/m  as calculated from the following:    Height as of this encounter: 1.544 m (5' 0.79\").    Weight as of this encounter: 93.7 kg (206 lb 8 oz).  Physical Exam  GENERAL: alert and no distress  EYES: Eyes " grossly normal to inspection, PERRL and conjunctivae and sclerae normal  HENT: ear canals and TM's normal, nose and mouth without ulcers or lesions  NECK: no adenopathy, no asymmetry, masses, or scars  RESP: lungs clear to auscultation - no rales, rhonchi or wheezes  CV: regular rate and rhythm, normal S1 S2, no S3 or S4, no murmur, click or rub, no peripheral edema  ABDOMEN: soft, nontender, no hepatosplenomegaly, no masses and bowel sounds normal  MS: no gross musculoskeletal defects noted, no edema  SKIN: no suspicious lesions or rashes  NEURO: Normal strength and tone, mentation intact and speech normal  PSYCH: mentation appears normal, affect normal/bright    Recent Labs   Lab Test 01/21/25  1209 12/17/24  0907 11/26/24  1147 11/05/24  1147 09/03/24  1122   HGB 9.7* 9.6*  --    < > 10.3*    199  --    < > 207    137  --   --  138   POTASSIUM 4.5 4.4  --   --  4.3   CR 0.92 1.14*  --   --  0.89   A1C  --   --  5.8*  --  5.3    < > = values in this interval not displayed.        Diagnostics  Recent Results (from the past 48 hours)   Basic metabolic panel  (Ca, Cl, CO2, Creat, Gluc, K, Na, BUN)    Collection Time: 01/21/25 12:09 PM   Result Value Ref Range    Sodium 142 135 - 145 mmol/L    Potassium 4.5 3.4 - 5.3 mmol/L    Chloride 104 98 - 107 mmol/L    Carbon Dioxide (CO2) 24 22 - 29 mmol/L    Anion Gap 14 7 - 15 mmol/L    Urea Nitrogen 19.5 8.0 - 23.0 mg/dL    Creatinine 0.92 0.51 - 0.95 mg/dL    GFR Estimate 70 >60 mL/min/1.73m2    Calcium 8.8 8.8 - 10.4 mg/dL    Glucose 131 (H) 70 - 99 mg/dL   CBC with platelets and differential    Collection Time: 01/21/25 12:09 PM   Result Value Ref Range    WBC Count 4.8 4.0 - 11.0 10e3/uL    RBC Count 3.10 (L) 3.80 - 5.20 10e6/uL    Hemoglobin 9.7 (L) 11.7 - 15.7 g/dL    Hematocrit 30.0 (L) 35.0 - 47.0 %    MCV 97 78 - 100 fL    MCH 31.3 26.5 - 33.0 pg    MCHC 32.3 31.5 - 36.5 g/dL    RDW 12.8 10.0 - 15.0 %    Platelet Count 211 150 - 450 10e3/uL    %  Neutrophils 53 %    % Lymphocytes 36 %    % Monocytes 9 %    % Eosinophils 1 %    % Basophils 0 %    % Immature Granulocytes 0 %    Absolute Neutrophils 2.6 1.6 - 8.3 10e3/uL    Absolute Lymphocytes 1.8 0.8 - 5.3 10e3/uL    Absolute Monocytes 0.4 0.0 - 1.3 10e3/uL    Absolute Eosinophils 0.1 0.0 - 0.7 10e3/uL    Absolute Basophils 0.0 0.0 - 0.2 10e3/uL    Absolute Immature Granulocytes 0.0 <=0.4 10e3/uL      EKG: appears normal, NSR, no LVH by voltage criteria, nonspecific ST-T changes, unchanged from previous tracings, left axis deviation    Revised Cardiac Risk Index (RCRI)  The patient has the following serious cardiovascular risks for perioperative complications:   - Diabetes Mellitus (on Insulin) = 1 point     RCRI Interpretation: 1 point: Class II (low risk - 0.9% complication rate)         Signed Electronically by: Albino Rainey MD  A copy of this evaluation report is provided to the requesting physician.         Answers submitted by the patient for this visit:  Patient Health Questionnaire (Submitted on 1/21/2025)  If you checked off any problems, how difficult have these problems made it for you to do your work, take care of things at home, or get along with other people?: Not difficult at all  PHQ9 TOTAL SCORE: 6  General Questionnaire (Submitted on 1/21/2025)  Chief Complaint: Chronic problems general questions HPI Form  What is the reason for your visit today? : A check up and let the doctor know about my health  How many servings of fruits and vegetables do you eat daily?: 0-1  On average, how many sweetened beverages do you drink each day (Examples: soda, juice, sweet tea, etc.  Do NOT count diet or artificially sweetened beverages)?: 2  How many minutes a day do you exercise enough to make your heart beat faster?: 9 or less  How many days a week do you exercise enough to make your heart beat faster?: 3 or less  How many days per week do you miss taking your medication?: 0  Questionnaire  about: Chronic problems general questions HPI Form (Submitted on 1/21/2025)  Chief Complaint: Chronic problems general questions HPI Form

## 2025-01-24 ENCOUNTER — HOSPITAL ENCOUNTER (OUTPATIENT)
Dept: MAMMOGRAPHY | Facility: CLINIC | Age: 65
Discharge: HOME | End: 2025-01-24
Attending: SURGERY
Payer: COMMERCIAL

## 2025-01-24 DIAGNOSIS — C50.911 INVASIVE DUCTAL CARCINOMA OF BREAST, FEMALE, RIGHT (H): ICD-10-CM

## 2025-01-24 PROCEDURE — 19285 PERQ DEV BREAST 1ST US IMAG: CPT | Mod: RT

## 2025-01-24 PROCEDURE — A4648 IMPLANTABLE TISSUE MARKER: HCPCS

## 2025-01-24 PROCEDURE — 250N000009 HC RX 250: Performed by: STUDENT IN AN ORGANIZED HEALTH CARE EDUCATION/TRAINING PROGRAM

## 2025-01-24 PROCEDURE — 19286 PERQ DEV BREAST ADD US IMAG: CPT

## 2025-01-24 PROCEDURE — 999N000065 MA POST PROCEDURE RIGHT

## 2025-01-24 RX ADMIN — LIDOCAINE HYDROCHLORIDE 10 ML: 10 INJECTION, SOLUTION INFILTRATION; PERINEURAL at 14:05

## 2025-01-24 NOTE — DISCHARGE INSTRUCTIONS
What to Do after Localizer Placement  Bleeding or bruising  A little bruising is normal.  If you bleed through the bandage, put direct pressure on the site.  Bandages and showering  If your surgery is within the next 24 hours, follow the showering instructions your surgeon gave you. Remove your bandage when you're ready to begin cleansing.  If your surgery is more than 24 hours away, keep your bandage in place and dry until tomorrow morning. You may remove the bandage and shower tomorrow.  Activity  No restrictions: You can be as active as normal.  Discomfort  If you're sore, place an ice pack on the area for 15 to 20 minutes, several times a day.   What to do for infection  Infection is rare. Signs of infection include:  Fever (including sweats and chills)  Redness  Pain that gets worse  Fluid draining from the site  Call the [facility] if you have any questions or if:  You have bleeding that lasts more than 20 minutes  You have pain that you can't control  You have signs of infection (fever, redness, drainage, or other signs)  After hours, please call the doctor who ordered your exam.   For informational purposes only. Not to replace the advice of your health care provider. Copyright   2023 [facility]. All rights reserved. Clinically reviewed by Arianne Stephenson, Director, Breast Imaging. [place] [id number] - Rev 08/23.

## 2025-01-24 NOTE — PROGRESS NOTES
PTA medications updated by Medication Scribe prior to surgery via phone call with patient (last doses completed by Nurse)     Medication history sources: MAR ([employer])  In the past week, patient estimated taking medication this percent of the time: Greater than 90%      Significant changes made to the medication list:  None      Additional medication history information:   None    Medication reconciliation completed by provider prior to medication history? No    Time spent in this activity: 40 minutes    The information provided in this note is only as accurate as the sources available at the time of update(s)      Prior to Admission medications    Medication Sig Last Dose Taking? Auth Provider Long Term End Date   acetaminophen (TYLENOL) 500 MG tablet Take 2 tablets (1,000 mg) by mouth 3 times daily. Morning Yes Albino Rainey MD No    albuterol (PROAIR HFA/PROVENTIL HFA/VENTOLIN HFA) 108 (90 Base) MCG/ACT inhaler Inhale 2 puffs into the lungs every 6 hours as needed for shortness of breath, wheezing or cough  Yes Albino Rainey MD Yes    alendronate (FOSAMAX) 70 MG tablet TAKE 1 TABLET BY MOUTH EVERY 7 DAYS 1/15/2025 Noon Yes Albino Rainey MD Yes    amantadine (SYMMETREL) 100 MG capsule TAKE 1 CAPSULE BY MOUTH DAILY Morning Yes Albino Rainey MD No    amLODIPine (NORVASC) 10 MG tablet Take 1 tablet (10 mg) by mouth every morning. Morning Yes Albino Rainey MD Yes    ASPIRIN LOW DOSE 81 MG EC tablet TAKE 1 TABLET BY MOUTH DAILY 1/20/2025 Morning Yes Albino Rainey MD     atorvastatin (LIPITOR) 80 MG tablet TAKE 1 TABLET BY MOUTH DAILY  8:00 PM Yes Albino Rainey MD Yes    buPROPion (WELLBUTRIN XL) 150 MG 24 hr tablet Take 1 tablet (150 mg) by mouth every morning. Morning Yes Albino Rainey MD Yes    calcium carbonate (OS-ALLEN) 1500 (600 Ca) MG tablet TAKE 1 TABLET BY MOUTH DAILY Morning Yes Albino Rainey  MD No    calcium polycarbophil (FIBERCON) 625 MG tablet Take 2 tablets (1,250 mg) by mouth twice a week. Take (Wednesday) with Ozempic and (Thursday) day after Ozempic.  Discontinue if diarrhea WORSE.  Consoder also giving Tuesday, day before if reduces loose stool, but not resolved.  Yes Chanell Duque PA-C Yes    chlorhexidine (PERIDEX) 0.12 % solution Swish and spit 10 mLs in mouth 2 times daily Morning Yes Albino Rainey MD     clonazePAM (KLONOPIN) 0.5 MG tablet Take 1 tablet (0.5 mg) by mouth at bedtime.  8:00 PM Yes Albino Rainey MD Yes    diclofenac (VOLTAREN) 1 % topical gel APPLY 4 GRAMS TO PAINFUL AREA AT BEDTIME . MAY USE AN ADDITIONAL 3 DOSES DURING  Yes Albino Rainey MD     gabapentin (NEURONTIN) 300 MG capsule TAKE 1 CAPSULE BY MOUTH AT BEDTIME  8:00 PM Yes Albino Rainey MD Yes    HUMALOG KWIKPEN 100 UNIT/ML soln Inject 0-12 Units subcutaneously 4 times daily (with meals and nightly) Before meals: BG 81 - 150:  0 units 151 - 200: 2 units, 201 - 250: 4 units, 251-300: 6 units, 301 - 350: 8 units, 351 or greater : 10 units At BEDTIME-If >4 hours since last Humalog injection For  - 249 give 1 units, 250 - 299 give 2 units,300 - 349 give 3 units, = or > 350 give 4 units. Evening Yes Chanell Duque PA-C Yes    hydrOXYzine (VISTARIL) 25 MG capsule TAKE 1 CAPSULE BY MOUTH EVERY NIGHT AT BEDTIME ANXIETY/SLEEP  Yes Albino Rainey MD     ibuprofen (ADVIL/MOTRIN) 200 MG tablet Take 200 mg by mouth every 4 hours as needed for pain  Yes Unknown, Entered By History No    insulin glargine (LANTUS SOLOSTAR) 100 UNIT/ML pen Inject 22 Units subcutaneously daily.  8:00 PM Yes Chanell Duque PA-C Yes    leflunomide (ARAVA) 20 MG tablet TAKE 1 TABLET BY MOUTH DAILY *LABS EVERY 8-12 WEEKS Morning Yes Alexus Posey MD Yes    Lidocaine (LIDOCAINE PAIN RELIEF) 4 % Patch APPLY 1 PATCH ONTO SKIN EVERY 24 HOURS ; APPLY AT NIGHT AND REMOVE IN THE MORNING  ( TWELVE HOURS PATCH FREE)  8:00 PM Yes Albino Rainey MD No    loperamide (IMODIUM) 2 MG capsule TAKE 1 CAPSULE BY MOUTH FOUR TIMES A DAY AS NEEDED FOR DIARRHEA  Yes Albino Rainey MD No    losartan (COZAAR) 100 MG tablet TAKE 1 TABLET BY MOUTH DAILY Morning Yes Albino Rainey MD Yes    Magnesium Oxide 250 MG TABS Take 1 tablet (250 mg) by mouth at bedtime.  8:00 PM Yes Albino Rainey MD     melatonin 5 MG tablet Take 5 mg by mouth at bedtime  8:00 PM Yes Reported, Patient     metoprolol succinate ER (TOPROL XL) 50 MG 24 hr tablet TAKE 1 TABLET BY MOUTH IN THE MORNING AND TAKE 2 TABLETS AT BEDTIME Morning Yes Albino Rainey MD Yes    mirabegron (MYRBETRIQ) 50 MG 24 hr tablet Take 1 tablet (50 mg) by mouth daily. Morning Yes Gisselle Nash PA-C     naproxen (NAPROSYN) 375 MG tablet Take 1 tablet (375 mg) by mouth 2 times daily as needed for moderate pain (denatl pain)  Yes Albino Rainey MD     NYAMYC 425394 UNIT/GM external powder APPLY TOPICALLY TWICE A DAY AS NEEDED  Yes Albino Rainey MD No    ondansetron (ZOFRAN) 4 MG tablet TAKE 1 TABLET BY MOUTH EVERY 8 HOURS AS NEEDED FOR NAUSEA  Yes Albino Rainey MD No    paliperidone ER (INVEGA) 6 MG 24 hr tablet TAKE 2 TABLETS BY MOUTH EVERY NIGHT AT BEDTIME  8:00 PM Yes Albino Rainey MD Yes    pantoprazole (PROTONIX) 40 MG EC tablet TAKE 1 TABLET (40 MG) BY MOUTH DAILY Morning Yes Albino Rainey MD     prazosin (MINIPRESS) 1 MG capsule Take 1 mg by mouth at bedtime  8:00 PM Yes Reported, Patient No    QUEtiapine (SEROQUEL) 100 MG tablet Take 100 mg by mouth at bedtime  8:00 PM Yes Reported, Patient Yes    senna-docusate (SENOKOT S) 8.6-50 MG tablet Take 1 tablet by mouth daily as needed for constipation.  Yes Albino Rainey MD No    sertraline (ZOLOFT) 100 MG tablet Take 100 mg by mouth daily Morning Yes Reported, Patient No     Tirzepatide 7.5 MG/0.5ML SOAJ Inject 0.5 mLs (7.5 mg) subcutaneously every 7 days. 1/15/2025 Noon Yes Chanell Duque PA-C Yes    traZODone (DESYREL) 100 MG tablet TAKE 1 TABLET BY MOUTH AT BEDTIME  8:00 PM Yes Albino Rainey MD Yes    vitamin C (ASCORBIC ACID) 500 MG tablet TAKE 2 TABLETS BY MOUTH IN THE MORNING AND TAKE 2 TABLETS BY MOUTH IN THE EVENING  8:00 PM Yes Gisselle aNsh PA-C     Vitamin D, Cholecalciferol, 25 MCG (1000 UT) CAPS Take 2 capsules by mouth daily. Morning Yes Albino Rainey MD     zinc oxide (DESITIN) 20 % external ointment Apply topically 3 times daily as needed for irritation (barrier cream)  Yes Rowena Haas APRN CNP     Alcohol Swabs (EASY TOUCH ALCOHOL PREP MEDIUM) 70 % PADS APPLY 1 UNITS TOPICALLY 8 TIMES DAILY   Albino Rainey MD     blood glucose (NO BRAND SPECIFIED) test strip Use to test blood sugar 10 times daily or as directed.   Albino Rainey MD     Continuous Glucose Sensor (DEXCOM G6 SENSOR) MISC Change every 10 days.   Chanell Duque PA-C     Continuous Glucose Transmitter (DEXCOM G6 TRANSMITTER) MISC Change every 3 months.   Chanell Duque PA-C     insulin pen needle (BD AUTOSHIELD DUO) 30G X 5 MM USE 6 DAILY OR AS DIRECTED   Albino Rainey MD     Lancets (ONETOUCH DELICA PLUS [id number]) MISC 1 EACH AS NEEDED  USE TO TEST BLOOD SUGARS 1-2 TIMES DAILY AS DIRECTED   Albino Rainey MD     order for DME Equipment being ordered: Depends.   Rowena Haas APRN CNP     order for DME Equipment being ordered: CPAP Supplies.   Rowena Haas APRN CNP         Medication history completed by: Abilio Moya

## 2025-01-24 NOTE — DISCHARGE INSTRUCTIONS
What to Do after Localizer Placement  Bleeding or bruising  A little bruising is normal.  If you bleed through the bandage, put direct pressure on the site.  Bandages and showering  If your surgery is within the next 24 hours, follow the showering instructions your surgeon gave you. Remove your bandage when you're ready to begin cleansing.  If your surgery is more than 24 hours away, keep your bandage in place and dry until tomorrow morning. You may remove the bandage and shower tomorrow.  Activity  No restrictions: You can be as active as normal.  Discomfort  If you're sore, place an ice pack on the area for 15 to 20 minutes, several times a day.   What to do for infection  Infection is rare. Signs of infection include:  Fever (including sweats and chills)  Redness  Pain that gets worse  Fluid draining from the site  Call the Breast Center if you have any questions or if:  You have bleeding that lasts more than 20 minutes  You have pain that you can't control  You have signs of infection (fever, redness, drainage, or other signs)  After hours, please call the doctor who ordered your exam.   For informational purposes only. Not to replace the advice of your health care provider. Copyright   2023 Health system. All rights reserved. Clinically reviewed by Arianne Stephensno, Director, Breast Imaging. [employer] [id number] - Rev 08/23.

## 2025-01-26 ENCOUNTER — ANESTHESIA EVENT (OUTPATIENT)
Dept: SURGERY | Facility: CLINIC | Age: 65
End: 2025-01-26
Payer: COMMERCIAL

## 2025-01-26 NOTE — ANESTHESIA PREPROCEDURE EVALUATION
Anesthesia Pre-Procedure Evaluation    Patient: Nena Tang   MRN: [id number] : 1960        Procedure : Procedure(s):  Radiofrequency localized right partial mastectomy  radiofrequency localized right axillary lymph node excision, right axillary sentinel lymph node biopsy          Past Medical History:   Diagnosis Date    Acute respiratory failure with hypoxia (H) 2017    CAD (coronary artery disease)     2014 cath, nonbostructive stenosis to LAD, RCA.    Chronic low back pain 2013    Cocaine abuse, in remission (H)     Fecal urgency 2012    History of heroin abuse (H)     Hyperlipidemia LDL goal <100 10/31/2010    Hypertension 2013    Illiterate 2011    Irritable bowel syndrome     Left cataract     Migraine 2012    Migraine headache 2013    Moderate major depression (H) 2011    Noncompliance with medication regimen 2011    Obesity     CINDY (obstructive sleep apnea) 2012    uses CPAP    CINDY (obstructive sleep apnea)- mild AHI 10.3     Osteopenia 10/07/2009    Pneumonia     Pneumonia of right lower lobe due to infectious organism 2017    Schizoaffective disorder, depressive type (H) 2013    Sepsis (H) 2017    Suicidal intent 10/02/2013    Takotsubo cardiomyopathy     Type 2 diabetes mellitus (H) 2011    Uterine cancer (H)     Verbal auditory hallucination 10/04/2012      Past Surgical History:   Procedure Laterality Date    CATARACT IOL, RT/LT Bilateral     CHOLECYSTECTOMY      COLONOSCOPY N/A 3/16/2017    Procedure: COLONOSCOPY;  Surgeon: Traci Gonzalez MD;  Location: [facility]    Coronary CTA  2014    HYSTERECTOMY  1983    uterine cancer yearly pap's per provider.    HYSTERECTOMY      LAPAROSCOPIC CHOLECYSTECTOMY  2008    PHACOEMULSIFICATION CLEAR CORNEA WITH STANDARD INTRAOCULAR LENS IMPLANT Left 2017    Procedure: PHACOEMULSIFICATION CLEAR CORNEA WITH STANDARD INTRAOCULAR LENS IMPLANT;   LEFT EYE PHACOEMULSIFICATION CLEAR CORNEA WITH STANDARD INTRAOCULAR LENS IMPLANT ;  Surgeon: Tyra Diaz MD;  Location:  [facility]    PHACOEMULSIFICATION CLEAR CORNEA WITH STANDARD INTRAOCULAR LENS IMPLANT Right 6/30/2017    Procedure: PHACOEMULSIFICATION CLEAR CORNEA WITH STANDARD INTRAOCULAR LENS IMPLANT;  RIGHT EYE PHACOEMULSIFICATION CLEAR CORNEA WITH STANDARD INTRAOCULAR LENS IMPLANT;  Surgeon: Tyra Diaz MD;  Location:  [facility]    RELEASE TRIGGER FINGER  10/11/2012    Left thumb. Procedure: RELEASE TRIGGER FINGER;  LEFT THUMB TRIGGER RELEASE;  Surgeon: Tay Langley MD;  Location:  [facility]    RELEASE TRIGGER FINGER Right 12/26/2016    Procedure: RELEASE TRIGGER FINGER;  Surgeon: Albino Castañeda MD;  Location:  OR    [facility] OOPHORECTORMY FOR RAHEL, W/BX  1983    UTERINE      Allergies   Allergen Reactions    Imidazole Antifungals Hives     Tolerates diflucan    Ketoprofen Itching     Pruritis to topical    Lisinopril Hives    Metformin Other (See Comments)     Patient hospitalized for lactic acidosis - admitting provider suspectd caused by metformin    Metronidazole Hives    Penicillins     Posaconazole Hives     Tolerates diflucan      Social History     Tobacco Use    Smoking status: Never    Smokeless tobacco: Never   Substance Use Topics    Alcohol use: Not Currently     Comment: when younger      Wt Readings from Last 1 Encounters:   01/21/25 93.7 kg (206 lb 8 oz)        Anesthesia Evaluation   Pt has had prior anesthetic.     No history of anesthetic complications       ROS/MED HX  ENT/Pulmonary:     (+) sleep apnea, uses CPAP,                                      Neurologic: Comment: Tardive dyskinesia, fibromyalgia    (+)      migraines,                          Cardiovascular:     (+)  hypertension- -  CAD -  - -                                   (-) dyslipidemia   METS/Exercise Tolerance: 4 - Raking leaves, gardening    Hematologic:     (+)      anemia,          Musculoskeletal:        GI/Hepatic:     (+) GERD, Asymptomatic on medication,                  Renal/Genitourinary:     (+) renal disease, type: CRI, Pt does not require dialysis,           Endo:     (+)  type II DM (CGM),   Using insulin,          Obesity,       Psychiatric/Substance Use:     (+) psychiatric history        Infectious Disease:       Malignancy:   (+) Malignancy, History of Breast.Breast CA Active status post.      Other:      (+)  , H/O Chronic Pain,         Physical Exam    Airway        Mallampati: II   TM distance: > 3 FB   Neck ROM: full   Mouth opening: > 3 cm    Respiratory Devices and Support         Dental         B=Bridge, C=Chipped, L=Loose, M=Missing    Cardiovascular   cardiovascular exam normal       Rhythm and rate: regular and normal     Pulmonary   pulmonary exam normal        breath sounds clear to auscultation           OUTSIDE LABS:  CBC:   Lab Results   Component Value Date    WBC 4.8 01/21/2025    WBC 4.7 12/17/2024    HGB 9.7 (L) 01/21/2025    HGB 9.6 (L) 12/17/2024    HCT 30.0 (L) 01/21/2025    HCT 29.4 (L) 12/17/2024     01/21/2025     12/17/2024     BMP:   Lab Results   Component Value Date     01/21/2025     12/17/2024    POTASSIUM 4.5 01/21/2025    POTASSIUM 4.4 12/17/2024    CHLORIDE 104 01/21/2025    CHLORIDE 105 12/17/2024    CO2 24 01/21/2025    CO2 23 12/17/2024    BUN 19.5 01/21/2025    BUN 20.3 12/17/2024    CR 0.92 01/21/2025    CR 1.14 (H) 12/17/2024     (H) 01/21/2025     (H) 12/17/2024     COAGS:   Lab Results   Component Value Date    PTT 24 06/19/2011    INR 0.88 09/27/2022     POC:   Lab Results   Component Value Date     (H) 06/15/2021    HCG Negative 05/15/2019    HCGS Negative 06/20/2011     HEPATIC:   Lab Results   Component Value Date    ALBUMIN 4.1 12/17/2024    PROTTOTAL 6.9 12/17/2024    ALT 20 12/17/2024    AST 21 12/17/2024    GGT 42 (H) 10/25/2016    ALKPHOS 79 12/17/2024    BILITOTAL 0.2 12/17/2024    SINTIA 24 10/23/2017  "    OTHER:   Lab Results   Component Value Date    PH 7.34 (L) 10/21/2019    LACT 2.0 06/05/2023    A1C 5.8 (H) 11/26/2024    ALLEN 8.8 01/21/2025    PHOS 4.1 09/02/2017    MAG 1.9 09/25/2021    LIPASE 82 (H) 06/25/2023    AMYLASE 68 07/27/2021    TSH 2.86 09/21/2023    T4 1.03 05/28/2011    CRP 58.0 (H) 09/28/2021    SED 21 06/20/2023       Anesthesia Plan    ASA Status:  3    NPO Status:  NPO Appropriate    Anesthesia Type: General.     - Airway: LMA   Induction: Propofol.   Maintenance: Balanced.        Consents    Anesthesia Plan(s) and associated risks, benefits, and realistic alternatives discussed. Questions answered and patient/representative(s) expressed understanding.     - Discussed:     - Discussed with:  Patient            Postoperative Care    Pain management: Multi-modal analgesia, IV analgesics.   PONV prophylaxis: Ondansetron (or other 5HT-3), Dexamethasone or Solumedrol     Comments:               Krystyna Coffey MD    I have reviewed the pertinent notes and labs in the chart from the past 30 days and (re)examined the patient.  Any updates or changes from those notes are reflected in this note.    Clinically Significant Risk Factors Present on Admission                   # Hypertension: Noted on problem list           # Obesity: Estimated body mass index is 39.29 kg/m  as calculated from the following:    Height as of 1/21/25: 1.544 m (5' 0.79\").    Weight as of 1/21/25: 93.7 kg (206 lb 8 oz).                "

## 2025-01-27 ENCOUNTER — ANESTHESIA (OUTPATIENT)
Dept: SURGERY | Facility: CLINIC | Age: 65
End: 2025-01-27
Payer: COMMERCIAL

## 2025-01-27 ENCOUNTER — HOSPITAL ENCOUNTER (OUTPATIENT)
Dept: NUCLEAR MEDICINE | Facility: CLINIC | Age: 65
Setting detail: NUCLEAR MEDICINE
Discharge: HOME | End: 2025-01-27
Attending: SURGERY
Payer: COMMERCIAL

## 2025-01-27 ENCOUNTER — HOSPITAL ENCOUNTER (OUTPATIENT)
Dept: MAMMOGRAPHY | Facility: CLINIC | Age: 65
Discharge: HOME | End: 2025-01-27
Attending: SURGERY | Admitting: SURGERY
Payer: COMMERCIAL

## 2025-01-27 ENCOUNTER — HOSPITAL ENCOUNTER (OUTPATIENT)
Facility: CLINIC | Age: 65
Discharge: NURSING, REHAB OR LONG-TERM CARE | End: 2025-01-27
Attending: SURGERY | Admitting: SURGERY
Payer: COMMERCIAL

## 2025-01-27 VITALS
TEMPERATURE: 97 F | HEIGHT: 62 IN | RESPIRATION RATE: 16 BRPM | HEART RATE: 81 BPM | OXYGEN SATURATION: 93 % | SYSTOLIC BLOOD PRESSURE: 144 MMHG | DIASTOLIC BLOOD PRESSURE: 70 MMHG | WEIGHT: 204 LBS | BODY MASS INDEX: 37.54 KG/M2

## 2025-01-27 DIAGNOSIS — C50.911 INVASIVE DUCTAL CARCINOMA OF BREAST, FEMALE, RIGHT (H): ICD-10-CM

## 2025-01-27 DIAGNOSIS — G89.18 POST-OP PAIN: Primary | ICD-10-CM

## 2025-01-27 LAB
GLUCOSE BLDC GLUCOMTR-MCNC: 133 MG/DL (ref 70–99)
GLUCOSE BLDC GLUCOMTR-MCNC: 182 MG/DL (ref 70–99)
PATH REPORT.COMMENTS IMP SPEC: NORMAL
PATH REPORT.INTRAOP OBS SPEC DOC: NORMAL

## 2025-01-27 PROCEDURE — 250N000011 HC RX IP 250 OP 636: Performed by: STUDENT IN AN ORGANIZED HEALTH CARE EDUCATION/TRAINING PROGRAM

## 2025-01-27 PROCEDURE — 710N000012 HC RECOVERY PHASE 2, PER MINUTE: Performed by: SURGERY

## 2025-01-27 PROCEDURE — 250N000011 HC RX IP 250 OP 636: Performed by: SURGERY

## 2025-01-27 PROCEDURE — 38792 RA TRACER ID OF SENTINL NODE: CPT

## 2025-01-27 PROCEDURE — 19301 PARTIAL MASTECTOMY: CPT | Mod: AS | Performed by: PHYSICIAN ASSISTANT

## 2025-01-27 PROCEDURE — A9520 TC99 TILMANOCEPT DIAG 0.5MCI: HCPCS | Performed by: SURGERY

## 2025-01-27 PROCEDURE — 88341 IMHCHEM/IMCYTCHM EA ADD ANTB: CPT | Mod: 26 | Performed by: PATHOLOGY

## 2025-01-27 PROCEDURE — 250N000009 HC RX 250: Performed by: SURGERY

## 2025-01-27 PROCEDURE — 272N000001 HC OR GENERAL SUPPLY STERILE: Performed by: SURGERY

## 2025-01-27 PROCEDURE — 710N000009 HC RECOVERY PHASE 1, LEVEL 1, PER MIN: Performed by: SURGERY

## 2025-01-27 PROCEDURE — 250N000013 HC RX MED GY IP 250 OP 250 PS 637: Performed by: PHYSICIAN ASSISTANT

## 2025-01-27 PROCEDURE — 88342 IMHCHEM/IMCYTCHM 1ST ANTB: CPT | Mod: TC | Performed by: SURGERY

## 2025-01-27 PROCEDURE — 19301 PARTIAL MASTECTOMY: CPT | Mod: RT | Performed by: SURGERY

## 2025-01-27 PROCEDURE — 250N000013 HC RX MED GY IP 250 OP 250 PS 637: Performed by: STUDENT IN AN ORGANIZED HEALTH CARE EDUCATION/TRAINING PROGRAM

## 2025-01-27 PROCEDURE — 343N000001 HC RX 343 MED OP 636: Performed by: SURGERY

## 2025-01-27 PROCEDURE — 88307 TISSUE EXAM BY PATHOLOGIST: CPT | Mod: 26 | Performed by: PATHOLOGY

## 2025-01-27 PROCEDURE — 360N000082 HC SURGERY LEVEL 2 W/ FLUORO, PER MIN: Performed by: SURGERY

## 2025-01-27 PROCEDURE — 82962 GLUCOSE BLOOD TEST: CPT

## 2025-01-27 PROCEDURE — 999N000104 MA BREAST SPECIMEN RIGHT OR

## 2025-01-27 PROCEDURE — 250N000009 HC RX 250: Performed by: STUDENT IN AN ORGANIZED HEALTH CARE EDUCATION/TRAINING PROGRAM

## 2025-01-27 PROCEDURE — 88329 PATH CONSLTJ DRG SURG: CPT | Performed by: PATHOLOGY

## 2025-01-27 PROCEDURE — 370N000017 HC ANESTHESIA TECHNICAL FEE, PER MIN: Performed by: SURGERY

## 2025-01-27 PROCEDURE — 88342 IMHCHEM/IMCYTCHM 1ST ANTB: CPT | Mod: 26 | Performed by: PATHOLOGY

## 2025-01-27 PROCEDURE — 999N000141 HC STATISTIC PRE-PROCEDURE NURSING ASSESSMENT: Performed by: SURGERY

## 2025-01-27 PROCEDURE — 250N000025 HC SEVOFLURANE, PER MIN: Performed by: SURGERY

## 2025-01-27 PROCEDURE — 258N000003 HC RX IP 258 OP 636: Performed by: STUDENT IN AN ORGANIZED HEALTH CARE EDUCATION/TRAINING PROGRAM

## 2025-01-27 PROCEDURE — 38525 BIOPSY/REMOVAL LYMPH NODES: CPT | Mod: 51 | Performed by: SURGERY

## 2025-01-27 RX ORDER — CEFAZOLIN SODIUM/WATER 2 G/20 ML
2 SYRINGE (ML) INTRAVENOUS SEE ADMIN INSTRUCTIONS
Status: DISCONTINUED | OUTPATIENT
Start: 2025-01-27 | End: 2025-01-27 | Stop reason: HOSPADM

## 2025-01-27 RX ORDER — NALOXONE HYDROCHLORIDE 0.4 MG/ML
0.1 INJECTION, SOLUTION INTRAMUSCULAR; INTRAVENOUS; SUBCUTANEOUS
Status: DISCONTINUED | OUTPATIENT
Start: 2025-01-27 | End: 2025-01-27 | Stop reason: HOSPADM

## 2025-01-27 RX ORDER — FENTANYL CITRATE 50 UG/ML
INJECTION, SOLUTION INTRAMUSCULAR; INTRAVENOUS PRN
Status: DISCONTINUED | OUTPATIENT
Start: 2025-01-27 | End: 2025-01-27

## 2025-01-27 RX ORDER — PROPOFOL 10 MG/ML
INJECTION, EMULSION INTRAVENOUS PRN
Status: DISCONTINUED | OUTPATIENT
Start: 2025-01-27 | End: 2025-01-27

## 2025-01-27 RX ORDER — EPHEDRINE SULFATE 50 MG/ML
INJECTION, SOLUTION INTRAMUSCULAR; INTRAVENOUS; SUBCUTANEOUS PRN
Status: DISCONTINUED | OUTPATIENT
Start: 2025-01-27 | End: 2025-01-27

## 2025-01-27 RX ORDER — DEXAMETHASONE SODIUM PHOSPHATE 4 MG/ML
4 INJECTION, SOLUTION INTRA-ARTICULAR; INTRALESIONAL; INTRAMUSCULAR; INTRAVENOUS; SOFT TISSUE
Status: DISCONTINUED | OUTPATIENT
Start: 2025-01-27 | End: 2025-01-27 | Stop reason: HOSPADM

## 2025-01-27 RX ORDER — CEFAZOLIN SODIUM/WATER 2 G/20 ML
2 SYRINGE (ML) INTRAVENOUS
Status: COMPLETED | OUTPATIENT
Start: 2025-01-27 | End: 2025-01-27

## 2025-01-27 RX ORDER — HYDROMORPHONE HCL IN WATER/PF 6 MG/30 ML
0.4 PATIENT CONTROLLED ANALGESIA SYRINGE INTRAVENOUS EVERY 5 MIN PRN
Status: DISCONTINUED | OUTPATIENT
Start: 2025-01-27 | End: 2025-01-27 | Stop reason: HOSPADM

## 2025-01-27 RX ORDER — METOPROLOL SUCCINATE 50 MG/1
50 TABLET, EXTENDED RELEASE ORAL DAILY
Status: DISCONTINUED | OUTPATIENT
Start: 2025-01-27 | End: 2025-01-27 | Stop reason: HOSPADM

## 2025-01-27 RX ORDER — ONDANSETRON 2 MG/ML
4 INJECTION INTRAMUSCULAR; INTRAVENOUS EVERY 30 MIN PRN
Status: DISCONTINUED | OUTPATIENT
Start: 2025-01-27 | End: 2025-01-27 | Stop reason: HOSPADM

## 2025-01-27 RX ORDER — OXYCODONE HYDROCHLORIDE 5 MG/1
5 TABLET ORAL
Status: COMPLETED | OUTPATIENT
Start: 2025-01-27 | End: 2025-01-27

## 2025-01-27 RX ORDER — SODIUM CHLORIDE, SODIUM LACTATE, POTASSIUM CHLORIDE, CALCIUM CHLORIDE 600; 310; 30; 20 MG/100ML; MG/100ML; MG/100ML; MG/100ML
INJECTION, SOLUTION INTRAVENOUS CONTINUOUS
Status: DISCONTINUED | OUTPATIENT
Start: 2025-01-27 | End: 2025-01-27 | Stop reason: HOSPADM

## 2025-01-27 RX ORDER — BUPIVACAINE HYDROCHLORIDE 5 MG/ML
INJECTION, SOLUTION EPIDURAL; INTRACAUDAL
Status: DISCONTINUED
Start: 2025-01-27 | End: 2025-01-27 | Stop reason: HOSPADM

## 2025-01-27 RX ORDER — FENTANYL CITRATE 50 UG/ML
25 INJECTION, SOLUTION INTRAMUSCULAR; INTRAVENOUS EVERY 5 MIN PRN
Status: DISCONTINUED | OUTPATIENT
Start: 2025-01-27 | End: 2025-01-27 | Stop reason: HOSPADM

## 2025-01-27 RX ORDER — SODIUM CHLORIDE, SODIUM LACTATE, POTASSIUM CHLORIDE, CALCIUM CHLORIDE 600; 310; 30; 20 MG/100ML; MG/100ML; MG/100ML; MG/100ML
INJECTION, SOLUTION INTRAVENOUS CONTINUOUS PRN
Status: DISCONTINUED | OUTPATIENT
Start: 2025-01-27 | End: 2025-01-27

## 2025-01-27 RX ORDER — HYDROMORPHONE HCL IN WATER/PF 6 MG/30 ML
0.2 PATIENT CONTROLLED ANALGESIA SYRINGE INTRAVENOUS EVERY 5 MIN PRN
Status: DISCONTINUED | OUTPATIENT
Start: 2025-01-27 | End: 2025-01-27 | Stop reason: HOSPADM

## 2025-01-27 RX ORDER — PROPOFOL 10 MG/ML
INJECTION, EMULSION INTRAVENOUS CONTINUOUS PRN
Status: DISCONTINUED | OUTPATIENT
Start: 2025-01-27 | End: 2025-01-27

## 2025-01-27 RX ORDER — ONDANSETRON 4 MG/1
4 TABLET, ORALLY DISINTEGRATING ORAL EVERY 30 MIN PRN
Status: DISCONTINUED | OUTPATIENT
Start: 2025-01-27 | End: 2025-01-27 | Stop reason: HOSPADM

## 2025-01-27 RX ORDER — OXYCODONE HYDROCHLORIDE 5 MG/1
5 TABLET ORAL EVERY 6 HOURS PRN
Qty: 8 TABLET | Refills: 0 | Status: SHIPPED | OUTPATIENT
Start: 2025-01-27

## 2025-01-27 RX ORDER — ONDANSETRON 2 MG/ML
INJECTION INTRAMUSCULAR; INTRAVENOUS PRN
Status: DISCONTINUED | OUTPATIENT
Start: 2025-01-27 | End: 2025-01-27

## 2025-01-27 RX ORDER — FENTANYL CITRATE 50 UG/ML
50 INJECTION, SOLUTION INTRAMUSCULAR; INTRAVENOUS EVERY 5 MIN PRN
Status: DISCONTINUED | OUTPATIENT
Start: 2025-01-27 | End: 2025-01-27 | Stop reason: HOSPADM

## 2025-01-27 RX ORDER — LIDOCAINE HYDROCHLORIDE 20 MG/ML
INJECTION, SOLUTION INFILTRATION; PERINEURAL PRN
Status: DISCONTINUED | OUTPATIENT
Start: 2025-01-27 | End: 2025-01-27

## 2025-01-27 RX ORDER — DEXAMETHASONE SODIUM PHOSPHATE 4 MG/ML
INJECTION, SOLUTION INTRA-ARTICULAR; INTRALESIONAL; INTRAMUSCULAR; INTRAVENOUS; SOFT TISSUE PRN
Status: DISCONTINUED | OUTPATIENT
Start: 2025-01-27 | End: 2025-01-27

## 2025-01-27 RX ORDER — MAGNESIUM HYDROXIDE 1200 MG/15ML
LIQUID ORAL PRN
Status: DISCONTINUED | OUTPATIENT
Start: 2025-01-27 | End: 2025-01-27 | Stop reason: HOSPADM

## 2025-01-27 RX ORDER — BUPIVACAINE HYDROCHLORIDE 5 MG/ML
INJECTION, SOLUTION PERINEURAL PRN
Status: DISCONTINUED | OUTPATIENT
Start: 2025-01-27 | End: 2025-01-27 | Stop reason: HOSPADM

## 2025-01-27 RX ORDER — AMOXICILLIN 250 MG
1 CAPSULE ORAL 2 TIMES DAILY PRN
Qty: 10 TABLET | Refills: 0 | Status: SHIPPED | OUTPATIENT
Start: 2025-01-27

## 2025-01-27 RX ADMIN — LIDOCAINE HYDROCHLORIDE 60 MG: 20 INJECTION, SOLUTION INFILTRATION; PERINEURAL at 07:38

## 2025-01-27 RX ADMIN — FENTANYL CITRATE 25 MCG: 50 INJECTION, SOLUTION INTRAMUSCULAR; INTRAVENOUS at 11:15

## 2025-01-27 RX ADMIN — ONDANSETRON 4 MG: 2 INJECTION INTRAMUSCULAR; INTRAVENOUS at 07:47

## 2025-01-27 RX ADMIN — METOPROLOL SUCCINATE 50 MG: 50 TABLET, EXTENDED RELEASE ORAL at 07:01

## 2025-01-27 RX ADMIN — FENTANYL CITRATE 50 MCG: 50 INJECTION INTRAMUSCULAR; INTRAVENOUS at 07:38

## 2025-01-27 RX ADMIN — FENTANYL CITRATE 50 MCG: 50 INJECTION INTRAMUSCULAR; INTRAVENOUS at 08:35

## 2025-01-27 RX ADMIN — Medication 5 MG: at 08:47

## 2025-01-27 RX ADMIN — MIDAZOLAM 2 MG: 1 INJECTION INTRAMUSCULAR; INTRAVENOUS at 07:33

## 2025-01-27 RX ADMIN — Medication 5 MG: at 08:55

## 2025-01-27 RX ADMIN — Medication 5 MG: at 08:08

## 2025-01-27 RX ADMIN — SODIUM CHLORIDE, POTASSIUM CHLORIDE, SODIUM LACTATE AND CALCIUM CHLORIDE: 600; 310; 30; 20 INJECTION, SOLUTION INTRAVENOUS at 07:33

## 2025-01-27 RX ADMIN — SODIUM CHLORIDE, POTASSIUM CHLORIDE, SODIUM LACTATE AND CALCIUM CHLORIDE: 600; 310; 30; 20 INJECTION, SOLUTION INTRAVENOUS at 06:30

## 2025-01-27 RX ADMIN — PROPOFOL 100 MG: 10 INJECTION, EMULSION INTRAVENOUS at 07:38

## 2025-01-27 RX ADMIN — PROPOFOL 30 MCG/KG/MIN: 10 INJECTION, EMULSION INTRAVENOUS at 07:43

## 2025-01-27 RX ADMIN — TILMANOCEPT 0.52 MILLICURIE: KIT at 06:45

## 2025-01-27 RX ADMIN — Medication 2 G: at 07:33

## 2025-01-27 RX ADMIN — Medication 5 MG: at 08:09

## 2025-01-27 RX ADMIN — DEXAMETHASONE SODIUM PHOSPHATE 4 MG: 4 INJECTION, SOLUTION INTRA-ARTICULAR; INTRALESIONAL; INTRAMUSCULAR; INTRAVENOUS; SOFT TISSUE at 07:43

## 2025-01-27 RX ADMIN — OXYCODONE HYDROCHLORIDE 5 MG: 5 TABLET ORAL at 11:02

## 2025-01-27 RX ADMIN — Medication 5 MG: at 08:45

## 2025-01-27 ASSESSMENT — ACTIVITIES OF DAILY LIVING (ADL)
ADLS_ACUITY_SCORE: 58

## 2025-01-27 NOTE — ANESTHESIA CARE TRANSFER NOTE
Patient: Nena Tang    Procedure: Procedure(s):  Radiofrequency localized right partial mastectomy  radiofrequency localized right axillary lymph node excision, right axillary sentinel lymph node biopsy       Diagnosis: Invasive ductal carcinoma of breast, female, right (H) [C50.911]  Diagnosis Additional Information: No value filed.    Anesthesia Type:   General     Note:    Oropharynx: oropharynx clear of all foreign objects and spontaneously breathing  Level of Consciousness: drowsy  Oxygen Supplementation: face mask  Level of Supplemental Oxygen (L/min / FiO2): 6  Independent Airway: airway patency satisfactory and stable  Dentition: dentition unchanged  Vital Signs Stable: post-procedure vital signs reviewed and stable  Report to RN Given: handoff report given  Patient transferred to: Phase II    Handoff Report: Identifed the Patient, Identified the Reponsible Provider, Reviewed the pertinent medical history, Discussed the surgical course, Reviewed Intra-OP anesthesia mangement and issues during anesthesia, Set expectations for post-procedure period and Allowed opportunity for questions and acknowledgement of understanding      Vitals:  Vitals Value Taken Time   /75    Temp     Pulse 67    Resp 12    SpO2 99 % 01/27/25 0925   Vitals shown include unfiled device data.    Electronically Signed By: ART Zapien CRNA  January 27, 2025  9:27 AM

## 2025-01-27 NOTE — OR NURSING
Meets criteria for discharge.  Discharge instructions reviewed with pt and pt's designated responsible party.  Pt label on prescription bag from pharmacy matched to pt's wristband. Pharmacy bag opened with 2 prescriptions inside. Medications were reviewed to match pt wristband while pt and significant other agreed with identification. Prescriptions placed back in pharmacy bag resealed with tape and given to [facility] staff.

## 2025-01-27 NOTE — DISCHARGE INSTRUCTIONS
Same Day Surgery Discharge Instructions for  Sedation and General Anesthesia     It's not unusual to feel dizzy, light-headed or faint for up to 24 hours after surgery or while taking pain medication.  If you have these symptoms: sit for a few minutes before standing and have someone assist you when you get up to walk or use the bathroom.    You should rest and relax for the next 24 hours. We recommend you make arrangements to have an adult stay with you for at least 24 hours after your discharge.  Avoid hazardous and strenuous activity.    DO NOT DRIVE any vehicle or operate mechanical equipment for 24 hours following the end of your surgery.  Even though you may feel normal, your reactions may be affected by the medication you have received.    Do not drink alcoholic beverages for 24 hours following surgery.     Slowly progress to your regular diet as you feel able. It's not unusual to feel nauseated and/or vomit after receiving anesthesia.  If you develop these symptoms, drink clear liquids (apple juice, ginger ale, broth, 7-up, etc. ) until you feel better.  If your nausea and vomiting persists for 24 hours, please notify your surgeon.      All narcotic pain medications, along with inactivity and anesthesia, can cause constipation. Drinking plenty of liquids and increasing fiber intake will help.    For any questions of a medical nature, call your surgeon.    Do not make important decisions for 24 hours.    If you had general anesthesia, you may have a sore throat for a couple of days related to the breathing tube used during surgery.  You may use Cepacol lozenges to help with this discomfort.  If it worsens or if you develop a fever, contact your surgeon.     If you feel your pain is not well managed with the pain medications prescribed by your surgeon, please contact your surgeon's office to let them know so they can address your concerns.     Discharge Instructions: Using an   Incentive Spirometer  An  incentive spirometer is a device that helps you do deep breathing exercises. These exercises will help you breathe better and improve the function of your lungs. The incentive spirometer provides a way for you to take an active part in your recovery.  Follow these steps to use your incentive spirometer:  Inhale normally. Relax and breathe out.  Place your lips tightly around the mouthpiece.  Make sure the device is upright and not tilted.  Breathe in slowly and deeply. Fill your lungs with as much air as you can.   Hold your breath long enough to keep the disk raised for at least 3 seconds while keeping the bead on the right side between the two arrows.   Perform this exercise 10 times every hour while you re awake or as often as your doctor instructs.  If you were also taught coughing exercises, perform them regularly as instructed.      [facility]  Discharge Instructions: Post-Operative Breast Surgery    ACTIVITY  Take frequent short walks and increase your activity gradually.    Avoid strenuous physical activity or heavy lifting greater than 15-20 lbs. for 1-2 weeks with arm on the surgery side.  You may climb stairs.  Gentle rotation and stretching of your arms and shoulders will prevent joint stiffness.  You may drive without restrictions when you are not using any prescription pain medication and feel comfortable in a car.  You may return to work/school when you are comfortable without any prescription pain medication.    WOUND CARE  You may remove your ACE wrap/dressing and shower 48 hours after the surgery.  Pat your incisions dry and leave them open to air.  Re-apply dressing (Band-Aids or gauze/tape) as needed for drainage.  You may have steri-strips (looks like white tape) or skin glue on your incisions.  You may peel off the steri-strips 2 weeks after your surgery if they have not peeled off on their own.  If you have skin glue, it will peel up and fall off on its own.  Do  not soak your incisions in a tub or pool for 2 weeks.   Do not apply any lotions, creams, or ointments to your incisions.  A ridge under your incisions is normal and will gradually resolve.  Wear a supportive bra for 1-2 weeks, day and night.    DIET  Start with liquids, then gradually resume your regular diet as tolerated.   Drink plenty of liquids to stay hydrated.    PAIN  Expect some tenderness and discomfort at the incision site(s).  Use the prescribed pain medication at your discretion.  Expect gradual resolution of your pain over several days.  You may take ibuprofen with food (unless you have been told not to) or acetaminophen/Tylenol instead of or in addition to your prescribed pain medication.  If you are taking Norco or Percocet, do not take any additional acetaminophen/Tylenol.  Do not drink alcohol or drive while you are taking pain medications.    EXPECTATIONS  Pain medications can cause constipation.  Limit use when possible.  Take over the counter stool softener/stimulant, such as Colace or Senna, 1-2 times a day with plenty of water.  You may take a mild over the counter laxative, such as Miralax or a suppository, as needed.    You may discontinue these medications once you are having regular bowel movements and/or are no longer taking your narcotic pain medication.    RETURN APPOINTMENT  Follow up with your surgeon in 2-4 weeks.  Please call the office at [phone] to schedule your appointment.      CALL OUR OFFICE -[phone] IF YOU HAVE:   Chills or fever above 101 F.  Increased redness, warmth, or drainage at your incisions.  Significant bleeding.  Pain not relieved by your pain medication or rest.  Increasing pain after the first 48 hours.  Any other concerns or questions.

## 2025-01-27 NOTE — ANESTHESIA POSTPROCEDURE EVALUATION
Patient: Nena Tang    Procedure: Procedure(s):  Radiofrequency localized right partial mastectomy  radiofrequency localized right axillary lymph node excision, right axillary sentinel lymph node biopsy       Anesthesia Type:  General    Note:  Disposition: Outpatient   Postop Pain Control: Uneventful            Sign Out: Well controlled pain   PONV: No   Neuro/Psych: Uneventful            Sign Out: Acceptable/Baseline neuro status   Airway/Respiratory: Uneventful            Sign Out: Acceptable/Baseline resp. status   CV/Hemodynamics: Uneventful            Sign Out: Acceptable CV status; No obvious hypovolemia; No obvious fluid overload   Other NRE: NONE   DID A NON-ROUTINE EVENT OCCUR? No           Last vitals:  Vitals Value Taken Time   /74 01/27/25 1200   Temp 36.6  C (97.88  F) 01/27/25 1139   Pulse 81 01/27/25 1145   Resp 18 01/27/25 1200   SpO2 93 % 01/27/25 1200   Vitals shown include unfiled device data.    Electronically Signed By: Krystyna Coffey MD  January 27, 2025  12:07 PM

## 2025-01-27 NOTE — ANESTHESIA PROCEDURE NOTES
Airway       Patient location during procedure: OR       Procedure Start/Stop Times: 1/27/2025 7:40 AM  Staff -        CRNA: Gisel Garcia APRN CRNA       Performed By: CRNA  Consent for Airway        Urgency: elective  Indications and Patient Condition       Indications for airway management: madelaine-procedural       Induction type:intravenous       Mask difficulty assessment: 0 - not attempted    Final Airway Details       Final airway type: supraglottic airway    Supraglottic Airway Details        Type: LMA       Brand: Ambu AuraGain       LMA size: 4    Post intubation assessment        Placement verified by: capnometry, equal breath sounds and chest rise        Number of attempts at approach: 1       Number of other approaches attempted: 0       Secured with: tape       Ease of procedure: easy       Dentition: Intact and Unchanged    Medication(s) Administered   Medication Administration Time: 1/27/2025 7:40 AM

## 2025-01-28 DIAGNOSIS — M81.0 OSTEOPOROSIS WITHOUT CURRENT PATHOLOGICAL FRACTURE, UNSPECIFIED OSTEOPOROSIS TYPE: ICD-10-CM

## 2025-01-28 DIAGNOSIS — E78.5 HYPERLIPIDEMIA LDL GOAL <100: ICD-10-CM

## 2025-01-28 RX ORDER — ATORVASTATIN CALCIUM 80 MG/1
TABLET, FILM COATED ORAL
Qty: 28 TABLET | Refills: 5 | Status: SHIPPED | OUTPATIENT
Start: 2025-01-28

## 2025-01-28 NOTE — OP NOTE
General Surgery Operative Note    PREOPERATIVE DIAGNOSIS:  Invasive ductal carcinoma of breast, female, right (H) [C50.911]    POSTOPERATIVE DIAGNOSIS: Same    PROCEDURE:  RFID LOCALIZED LUMPECTOMY WITH SENTINEL LYMPH NODE BIOPSY, RFID tagged axillary lymph node excision, ONCOPLASTIC CLOSURE    ANESTHESIA:  General.    PREOPERATIVE MEDICATIONS:  Ancef    SURGEON:  Albino Giron MD    ASSISTANT:  Salvador Christine PA-C  A first assistant was necessary owing to challenging retraction, visualization, and exposure.    INDICATIONS:  New diagnosis right breast cancer.    DESCRIPTION OF PROCEDURE:  The patient was taken to the operating suite, and LMA was placed.  The right breast and axilla were prepped and draped in a sterile fashion.  Surgeon-initiated timeout was acknowledged.  We made a radial incision and took this down through skin and subcutaneous tissue.  We dissected toward the RFID tag and followed it until we were oriented directly above it.  We then created our partial mastectomy specimen by getting around the tissue deep to and around the seed in accordance with the post-placement mammograms.  We took this down to the chest wall and came around the tumor.  Specimen was then removed and inked according to protocol.  Specimen mammogram was performed and we identified the lesion, clip and RFID tag.  Specimen was sent fresh to pathology, where request for gross margin evaluation was made.  We received word that the margins were grossly sufficient.  The lumpectomy cavity had clips placed circumferentially.  We then mobilized the surrounding tissue, both superficially and at the level of the fascia.  This tissue was then reapproximated with 3-0 Vicryl suture to close the cavity and fill the defect.    We then set about performing our sentinel lymph node dissection and excising the previously tagged lymph node.  I was able to access the axilla through our radial breast incision.  We followed an area of elevated  radiotracer counts and encountered christiano tissue.  This was removed and sent as sentinel lymph node biopsy.   The seed was contained within the specimen, and the specimen also contained the sentinel lymph node.  We then did a tracer count of the remaining axillary bed and found no significant residual radiotracer uptake.  These nodes were sent for permanent section.  The wounds were irrigated at this point and closed in layers.  The wounds were dressed, and the patient was awakened and taken to the PACU in stable condition.  At the conclusion of the case, all lap and needle counts were correct.      ESTIMATED BLOOD LOSS: 20 mL    INTRAOPERATIVE FINDINGS:  Specimen mammogram demonstrated lesion, clip, and seed.  Grossly enlarged lymph node.    Albino Giron MD, MD

## 2025-02-03 LAB
PATH REPORT.COMMENTS IMP SPEC: ABNORMAL
PATH REPORT.COMMENTS IMP SPEC: YES
PATH REPORT.FINAL DX SPEC: ABNORMAL
PATH REPORT.GROSS SPEC: ABNORMAL
PATH REPORT.INTRAOP OBS SPEC DOC: ABNORMAL
PATH REPORT.MICROSCOPIC SPEC OTHER STN: ABNORMAL
PATH REPORT.MICROSCOPIC SPEC OTHER STN: ABNORMAL
PATH REPORT.RELEVANT HX SPEC: ABNORMAL
PATHOLOGY SYNOPTIC REPORT: ABNORMAL
PHOTO IMAGE: ABNORMAL

## 2025-02-05 ENCOUNTER — TELEPHONE (OUTPATIENT)
Dept: MAMMOGRAPHY | Facility: CLINIC | Age: 65
End: 2025-02-05
Payer: COMMERCIAL

## 2025-02-05 NOTE — TELEPHONE ENCOUNTER
Post-Operative General Surgery:    Surgery Type: Right lumpectomy with right sentinel lymph node biopsy    Date: 1/27/2025 - Dr. Albino Giron.    Attempted to call Nena. Left patient a voicemail message. Awaiting return call.     Then, called Edinson JIN at Nena's group home, and discussed patient's upcoming post-op follow up appointment with Dr. Giron on 2/20/2025 at 09:15 (09:00 arrival). Also, medical oncology follow up on 3/4 at 10:00 with Dr. Baca. Edinson has access to [employer] and will print out these next appts.     Blanka Aquino RN, BSN, PHN, CBCN  Breast Nurse Coordinator  [facility]  [phone]    Addendum: 2/5/2025 3:30pm    Called Nena to she how she is feeling post operatively and see if she has any questions or concerns related to her recent breast surgery. Patient states she's doing well overall. Mild discomfort after surgery, but not taking any medications. Patient showered last evening, but did not remove the outer bandage. Advised that she remove the outer bandage and keep steri strips on. She was agreeable to this. No other concerns at this time.     Then called Edinson JIN, to advise removal of the outer bandage today, and keep steri-strips in place. Encouraged her to call back with any post-operative healing concerns. She verbalized understanding and all questions were addressed at this time.     Blanka Aquino RN

## 2025-02-10 ENCOUNTER — ANCILLARY PROCEDURE (OUTPATIENT)
Dept: BONE DENSITY | Facility: CLINIC | Age: 65
End: 2025-02-10
Attending: INTERNAL MEDICINE
Payer: COMMERCIAL

## 2025-02-10 DIAGNOSIS — Z79.811 LONG TERM (CURRENT) USE OF AROMATASE INHIBITORS: ICD-10-CM

## 2025-02-10 PROCEDURE — 77080 DXA BONE DENSITY AXIAL: CPT | Mod: TC | Performed by: STUDENT IN AN ORGANIZED HEALTH CARE EDUCATION/TRAINING PROGRAM

## 2025-02-12 DIAGNOSIS — E11.3299 TYPE 2 DIABETES MELLITUS WITH MILD NONPROLIFERATIVE RETINOPATHY WITHOUT MACULAR EDEMA, WITH LONG-TERM CURRENT USE OF INSULIN, UNSPECIFIED LATERALITY (H): ICD-10-CM

## 2025-02-12 DIAGNOSIS — E11.22 TYPE 2 DIABETES MELLITUS WITH STAGE 3 CHRONIC KIDNEY DISEASE, WITH LONG-TERM CURRENT USE OF INSULIN, UNSPECIFIED WHETHER STAGE 3A OR 3B CKD (H): ICD-10-CM

## 2025-02-12 DIAGNOSIS — Z79.4 TYPE 2 DIABETES MELLITUS WITH STAGE 3 CHRONIC KIDNEY DISEASE, WITH LONG-TERM CURRENT USE OF INSULIN, UNSPECIFIED WHETHER STAGE 3A OR 3B CKD (H): ICD-10-CM

## 2025-02-12 DIAGNOSIS — Z79.4 TYPE 2 DIABETES MELLITUS WITH MILD NONPROLIFERATIVE RETINOPATHY WITHOUT MACULAR EDEMA, WITH LONG-TERM CURRENT USE OF INSULIN, UNSPECIFIED LATERALITY (H): ICD-10-CM

## 2025-02-12 DIAGNOSIS — N18.30 TYPE 2 DIABETES MELLITUS WITH STAGE 3 CHRONIC KIDNEY DISEASE, WITH LONG-TERM CURRENT USE OF INSULIN, UNSPECIFIED WHETHER STAGE 3A OR 3B CKD (H): ICD-10-CM

## 2025-02-12 RX ORDER — LANCETS 33 GAUGE
EACH MISCELLANEOUS
Qty: 100 EACH | Refills: 3 | Status: SHIPPED | OUTPATIENT
Start: 2025-02-12

## 2025-02-12 RX ORDER — BLOOD SUGAR DIAGNOSTIC
STRIP MISCELLANEOUS
Qty: 300 STRIP | Refills: 11 | Status: SHIPPED | OUTPATIENT
Start: 2025-02-12

## 2025-02-13 ENCOUNTER — TELEPHONE (OUTPATIENT)
Dept: MAMMOGRAPHY | Facility: CLINIC | Age: 65
End: 2025-02-13
Payer: COMMERCIAL

## 2025-02-13 ENCOUNTER — TELEPHONE (OUTPATIENT)
Dept: SURGERY | Facility: CLINIC | Age: 65
End: 2025-02-13
Payer: COMMERCIAL

## 2025-02-13 DIAGNOSIS — L03.90 CELLULITIS: Primary | ICD-10-CM

## 2025-02-13 RX ORDER — CEPHALEXIN 500 MG/1
500 CAPSULE ORAL 3 TIMES DAILY
Qty: 21 CAPSULE | Refills: 0 | Status: SHIPPED | OUTPATIENT
Start: 2025-02-13 | End: 2025-02-20

## 2025-02-13 NOTE — TELEPHONE ENCOUNTER
Patient had a radiofrequency localized R partial [place]astectomy [place]27[place] [facility] [place] calling to discuss how the incision looks, swollen, oozing, and tender    Please call     Phone: [phone]  Message ok

## 2025-02-13 NOTE — TELEPHONE ENCOUNTER
Breast Care Coordination:    Called and spoke to Edinson JIN at Nena's Group home. She said she received my voicemail message and was planning to call back after Nena returned from her day program.     Edinson RN, said she noticed brownish drainage on Nena's bra last evening. At that time, she took a look at Nena's incision and noted brownish oozing of the incision that had a foul odor, the incision site appeared pinkish in color and patient said the site was tender. Edinson stated that patient did not have a fever, and no other symptoms noted. Edinson placed a sterile gauze in Nena's bra today.     DAVIN Neves agrees to take a photo and upload to [employer] in 30 minutes when Nena returns from her group home.     Routing to [facility] team, and will work on coordination for clinic visit for further assessment.    Blanka Aquino, RN, BSN, PHN, CBCN  Breast Nurse Coordinator  [facility]  [phone]

## 2025-02-13 NOTE — TELEPHONE ENCOUNTER
Breast Care Coordination:    Called Edinson and Nena with an update on recommendation from Salvador Christine PA-C to start Keflex PO 500mg three times daily for 7 days, and follow-up at [facility] tomorrow at 11:00am. Location provided. They verbalized understanding and all questions were addressed at this time.     Blanka Aquino, RN, BSN, PHN, CBCN  Breast Nurse Coordinator  [facility]  [phone]

## 2025-02-13 NOTE — TELEPHONE ENCOUNTER
Breast Care Coordination:    Returned call to Edinson JIN at [facility] to follow up regarding post-op healing concerns. No answer. Left a voicemail message asking her to upload a picture to [email or web address] if possible and requested she call me directly to assess symptoms further and assist with scheduling an appointment with [facility]. Awaiting return call.     Blanka Aquino, RN, BSN, PHN, CBCN  Breast Nurse Coordinator  [facility]  [phone]

## 2025-02-17 ENCOUNTER — OFFICE VISIT (OUTPATIENT)
Dept: SURGERY | Facility: CLINIC | Age: 65
End: 2025-02-17
Payer: COMMERCIAL

## 2025-02-17 DIAGNOSIS — G89.18 POSTOPERATIVE PAIN: Primary | ICD-10-CM

## 2025-02-17 PROCEDURE — 99024 POSTOP FOLLOW-UP VISIT: CPT

## 2025-02-17 RX ORDER — HYDROCODONE BITARTRATE AND ACETAMINOPHEN 5; 325 MG/1; MG/1
1 TABLET ORAL EVERY 6 HOURS PRN
Qty: 10 TABLET | Refills: 0 | Status: SHIPPED | OUTPATIENT
Start: 2025-02-17 | End: 2025-02-17

## 2025-02-17 RX ORDER — HYDROCODONE BITARTRATE AND ACETAMINOPHEN 5; 325 MG/1; MG/1
1 TABLET ORAL EVERY 6 HOURS PRN
Qty: 10 TABLET | Refills: 0 | Status: SHIPPED | OUTPATIENT
Start: 2025-02-17

## 2025-02-17 NOTE — PROGRESS NOTES
"General Surgery Clinic Note    S: Doing ok over weekend.  Pt reports some pain to lower aspect of wound.  Hurts more at night.  Over the weekend, dressing changed but packing left in as instructed.  Denies fevers or chills.  Returns for further wound care.      O: NAD, pleasant, A&O, smiling.  Pt seen with group home RN.  Right breast: wound at lateral aspect.  The thin, light grey colored skin was excised without any sensation from the patient.  Wound tunnels to superior aspect up to 5-6cm.  Lower and medial aspects only 2cm.  Laterally toward the axilla is 3-4cm deep.  The dressing did have a foul smell today.  Removed the packing but not much more drainage could be expressed.  Rim of erythema around wound is less than 1cm and looks relatively unchanged.      Wound Care: removed packing, looked for tunneling and got sense of depths of wound.  No saline irrigation available today so used wound wash spray and soaked a 4x4.  Did some light debridement and irrigation with this.  Then repacked with 1/2\" packing tape, and reapplied gauze and ABD dressing to breast after wound care.  Group home nurse present during procedure.    See new picture taken today during packing.     Cultures preliminarily show:  Culture Culture in progress   2+ Proteus mirabilis Abnormal    3+ Enterococcus faecalis Abnormal          Gram stain:   Gram Stain Result  Abnormal   2+ Gram negative bacilli   1+ Gram positive bacilli   1+ Gram positive cocci   3+ WBC seen   Predominantly PMNs               A: Stable Right breast wound S/P lumpectomy      P: Discussed pt with Dr. Giron.  He has reviewed culture results and suggested continuing on Keflex as organisms noted to be susceptible to all antibiotics tested on sensitivity.  Monitor for return of fevers or chills.   Pt tolerates the wound changes very well, but will give prescription of Norco 5/325 #10 that she can take one pill as needed for pain at night that prevents sleep or if she wants to " take one prior to her dressing changes.  Have asked to have the patient return to clinic daily this week as this currently is too much for group home staff to complete and nurse covers multiple group homes and is not able to change daily.  Next visits this week were scheduled.  At end of week or early next week can see if this is improved enough to transition to every other day visits.  Some supplies given to change outer dressing PRN saturation.  Ok'd showers.    Yaakov Baptiste PA-C  [phone]

## 2025-02-18 ENCOUNTER — OFFICE VISIT (OUTPATIENT)
Dept: SURGERY | Facility: CLINIC | Age: 65
End: 2025-02-18
Payer: COMMERCIAL

## 2025-02-18 DIAGNOSIS — S21.001A WOUND OF RIGHT BREAST, INITIAL ENCOUNTER: Primary | ICD-10-CM

## 2025-02-18 PROCEDURE — 99024 POSTOP FOLLOW-UP VISIT: CPT

## 2025-02-18 NOTE — PROGRESS NOTES
[facility] Office Visit Note    Subjective: Nena Tang is here for a wound check and dressing change.  She underwent a RFID tag localized right breast lumpectomy with SLN biopsy, RFID tag axillary node excision and oncoplastic closure by Dr. Giron on 1/27/25.  She returned to the clinic 2/14/25 for wound concerns and underwent an I&D of the right breast wound with cultures and was started on Keflex.  Today she tells me she is doing well and denies any complaints.  She was here was a dressing change yesterday.     Objective:  R breast wound: clean, dry and open laterally. Malodorous but improved with cleansing of wound. Tunneling noted superiorly and laterally.  Mild tenderness with packing change. No erythema or purulent drainage noted. Breast overall is soft and non-tender.     Cx:   2+ Proteus mirabilis Abnormal    3+ Enterococcus faecalis Abnormal    Susceptible to cephalosporins    Assessment and Plan:  S/P RFID tag localized right breast lumpectomy with SLN biopsy, RFID tag axillary node excision and oncoplastic closure; I&D Right breast wound.   - Continue Keflex  - Ok to remove dressing and shower or cleanse with saline. Pack wound with 1 inch packing strip or 2 inch Kerlix (please note tunneling superiorly and laterally), apply 4x4 gauze, abd and tape.   - Follow up tomorrow as scheduled and Thursday with Dr. Giron  - Above plan sent with patient.    Keisha Woodard PA-C

## 2025-02-19 ENCOUNTER — OFFICE VISIT (OUTPATIENT)
Dept: SURGERY | Facility: CLINIC | Age: 65
End: 2025-02-19
Payer: COMMERCIAL

## 2025-02-19 DIAGNOSIS — Z09 FOLLOW-UP EXAMINATION: Primary | ICD-10-CM

## 2025-02-19 DIAGNOSIS — S21.001A WOUND OF RIGHT BREAST, INITIAL ENCOUNTER: ICD-10-CM

## 2025-02-19 PROCEDURE — 99024 POSTOP FOLLOW-UP VISIT: CPT

## 2025-02-19 NOTE — PROGRESS NOTES
"General Surgery Office Visit Note  Patient Name: Nena Tang  Today's Date: February 19, 2025    SUBJECTIVE  Nena Tang is here for a wound check and dressing change.  She underwent a RFID tag localized right breast lumpectomy with SLN biopsy, RFID tag axillary node excision and oncoplastic closure by Dr. Giron on 1/27/25.  She returned to the clinic 2/14/25 for wound concerns and underwent an I&D of the right breast wound with cultures and was started on Keflex.  Today she tells me she is doing well and denies any complaints.  She was here yesterday for most recent dressing change. Staff at her facility has changed the outer dressing once since then.     Cultures:   2+ Proteus mirabilis Abnormal    3+ Enterococcus faecalis Abnormal    Susceptible to cephalosporins    OBJECTIVE  General: well-developed, well-nourished, in no acute distress. Alert and oriented  Respiratory: non-labored breathing  Wound: dressings and packing removed from right breast wound. Wound is widely open, slightly malodorous with packing removal. Cleansed with saline. Tunneling present superior-medial breast, undermining superiorly and laterally. Wound bed is fairly clean with scattered slough, granulation tissue is present. Small amount of necrotic tissue is sharply debrided from wound bed and from skin edges. Surrounding skin is intact without erythema, no cellulitic changes, breast is soft with mild tenderness. Wound is 5cm  x 7cm depth x 4cm. Wound is packed with saline-soaked 2\" gauze roll, covered with dry dressings and tape.         ASSESSMENT/PLAN  Nena Tang is a 65 year old female s/p RFID tag localized right breast lumpectomy with SLN biopsy, RFID tag axillary node excision and oncoplastic closure with infected lumpectomy site s/p I&D in clinic. Wound now stable, making appropriate progress  - Continue Keflex  - Home care ordered for dressing changes at her facility  - Wound care reviewed, outer dressing to be " changed as needed between visits   - Return to clinic tomorrow with Dr. Giron. If RN at facility unable to change packing on Friday, Nena should make an appointment again Friday in our [facility] clinic  - Call with questions        Jolie Dudley PA-C  [facility]  [phone]

## 2025-02-20 ENCOUNTER — OFFICE VISIT (OUTPATIENT)
Dept: SURGERY | Facility: CLINIC | Age: 65
End: 2025-02-20
Payer: COMMERCIAL

## 2025-02-20 ENCOUNTER — MEDICAL CORRESPONDENCE (OUTPATIENT)
Dept: HEALTH INFORMATION MANAGEMENT | Facility: CLINIC | Age: 65
End: 2025-02-20

## 2025-02-20 ENCOUNTER — TELEPHONE (OUTPATIENT)
Dept: SURGERY | Facility: CLINIC | Age: 65
End: 2025-02-20

## 2025-02-20 DIAGNOSIS — N61.0 BREAST INFECTION: Primary | ICD-10-CM

## 2025-02-20 NOTE — TELEPHONE ENCOUNTER
Contacted [facility] to discuss starting care for patient. Caller took all the information and reviewed referral. Will have someone call me back to discuss further    Lexi Weldon RN-BSN

## 2025-02-20 NOTE — LETTER
February 20, 2025          Xiang Baca MD  [address] [place] [address]      RE:   Nena Tang 1960      Dear Colleague,    Thank you for referring your patient, Nena Tang, to [facility] Please see a copy of my visit note below.    Nena Tang presents today for surgical followup.  she is doing well following right lumpectomy.  Unfortunately, she developed a wound infection and the incision needed to be opened.  She is on appropriate antibiotics.  At this point, the breast does not look to be cellulitic.  She should finish her current course of antibiotics.  The real challenge is wound management.  She has a fairly sizable wound and it requires daily packing.  The people at her group home do not feel comfortable with that and we will try to set up home care for daily dressing changes.  She is largely homebound, and needs to arrange rides to get to our clinic, which is not open on the weekend.  I would like to see her back in a couple of weeks.     Again, thank you for allowing me to participate in the care of your patient.      Sincerely,      Albino Giron MD

## 2025-02-20 NOTE — PROGRESS NOTES
Surgery Postop Note    Nena Tang presents today for surgical followup.  she is doing well following right lumpectomy.  Unfortunately, she developed a wound infection and the incision needed to be opened.  She is on appropriate antibiotics.  At this point, the breast does not look to be cellulitic.  She should finish her current course of antibiotics.  The real challenge is wound management.  She has a fairly sizable wound and it requires daily packing.  The people at her group home do not feel comfortable with that and we will try to set up home care for daily dressing changes.  She is largely homebound, and needs to arrange rides to get to our clinic, which is not open on the weekend.  I would like to see her back in a couple of weeks..  Thank you for the opportunity to help in her care.    Steve Giron M.D.  [facility]  [phone]    Please route or send letter to:  Primary Care Provider (PCP) and Referring Provider

## 2025-02-25 ENCOUNTER — OFFICE VISIT (OUTPATIENT)
Dept: SURGERY | Facility: CLINIC | Age: 65
End: 2025-02-25
Payer: COMMERCIAL

## 2025-02-25 DIAGNOSIS — F51.04 PSYCHOPHYSIOLOGICAL INSOMNIA: ICD-10-CM

## 2025-02-25 DIAGNOSIS — F25.1 SCHIZOAFFECTIVE DISORDER, DEPRESSIVE TYPE (H): ICD-10-CM

## 2025-02-25 DIAGNOSIS — E55.9 VITAMIN D DEFICIENCY: ICD-10-CM

## 2025-02-25 DIAGNOSIS — Z09 FOLLOW-UP EXAMINATION: Primary | ICD-10-CM

## 2025-02-25 DIAGNOSIS — M25.50 POLYARTHRALGIA: ICD-10-CM

## 2025-02-25 PROCEDURE — 99207 PR NO CHARGE NURSE ONLY: CPT

## 2025-02-25 RX ORDER — CLONAZEPAM 0.5 MG/1
0.5 TABLET ORAL AT BEDTIME
Qty: 28 TABLET | Refills: 5 | Status: SHIPPED | OUTPATIENT
Start: 2025-02-25

## 2025-02-25 RX ORDER — CHOLECALCIFEROL (VITAMIN D3) 25 MCG
2 CAPSULE ORAL DAILY
Qty: 180 CAPSULE | Refills: 2 | Status: SHIPPED | OUTPATIENT
Start: 2025-02-25

## 2025-02-25 NOTE — PROGRESS NOTES
"Daily Wound cares performed. Wound is packed with saline-soaked 2\" gauze roll, covered with dry dressings and tape as instructed.     Patient is scheduled tomorrow at the [facility] clinic at 1:30 pm.     Richa Salazar, RN, BSN, PHN  Breast Care Nurse Coordinator  [facility] [place] [place]  "

## 2025-02-26 ENCOUNTER — OFFICE VISIT (OUTPATIENT)
Dept: SURGERY | Facility: CLINIC | Age: 65
End: 2025-02-26
Payer: COMMERCIAL

## 2025-02-26 DIAGNOSIS — S21.001A WOUND OF RIGHT BREAST, INITIAL ENCOUNTER: Primary | ICD-10-CM

## 2025-02-26 NOTE — PROGRESS NOTES
"   Progress Notes  General Surgery Office Visit Note  Patient Name: Nena Tang  Today's Date: February 21, 2025     SUBJECTIVE  Nena Tang is here for a wound check and dressing change.  She underwent a RFID tag localized right breast lumpectomy with SLN biopsy, RFID tag axillary node excision and oncoplastic closure by Dr. Giron on 1/27/25.  She unfortunately developed a wound infection and underwent I&D of the right breast wound with cultures. Final cultures with proteus and enterococcus.  She completed a round of Keflex. Wound has been making appropriate progress.     Today she tells me she is doing well and denies any complaints.  Her pain is gone now so she is pretty happy about this.  Still without fevers or chills.  She reports staff at her facility have usually been changing the outer dressing and allowing her to shower yesterday.  They have been using the shower head to irrigate the wound but while the packing is still inside.  Then using a gauze and ABD dressing to collect any drainage.         OBJECTIVE  General: well-developed, well-nourished, in no acute distress   Respiratory: non-labored breathing  Wound: dressings and packing removed from right breast wound. Wound is widely open, no foul odor noted. Cleansed with saline. Tunneling present superior-medial breast, undermining superiorly and laterally but this is decreasing. For example, at her first I&D, I was able to place a cotton tipped applicator as much as 7cm deep into the supero-medial space; now this is down to 3cm.  Wound bed is quite clean today with granulation tissue and scant areas of bleeding. Wound is approximately 4cm x 3cm x 3 cm deep. Wound is packed with saline-soaked 1\" packing tape, and covered with dry dressings and tape.               ASSESSMENT/PLAN  Nena Tang is a 65 year old female s/p right breast lumpectomy with postop wound infection s/p I&D. Open wound being packed, stable and making appropriate " progress  - Finished course of Keflex.  No gross signs of continued infection.  - Return to our office tomorrow as scheduled for dressing changes with RN/PA/surgeon  - Home care ordered for dressing changes at her facility. If this is approved, can decrease frequency of visits to surgical clinic. Clinic nurse working on this today.  - Wound care reviewed, outer dressing to be changed at least once daily and as needed between visits   - Call with questions           Yaakov Baptiste PA-C  [facility]  [phone]            Instructions    After Visit Summary (Automatic SnapShot taken 2/21/2025)  Additional Documentation    Vitals: LMP 01/06/2015 (Exact Date)   Encounter Info: Billing Info,     History,     Allergies,     Detailed Report     AVS Reports    Date/Time Report Action User   2/21/2025  1:38 PM After Visit Summary Automatically Generated Jolie Dudley PA-C     Encounter Information    Encounter Information   Provider Department Encounter # Center   2/21/2025 1:00 PM  [facility] [id number] [facility]     Reviewed this Encounter     Medications Problems Allergies History   Mary Malik MA   Reviewed Custom, Tobacco     Communicable/Travel screen  More data exists   2/17/2025 2/19/2025 2/21/2025 2/24/2025 2/26/2025                   Communicable/Travel Screening   Do you have any of the following new or worsening symptoms? None of these None of these None of these None of these None of these   Have you traveled internationally or domestically in the last month? No No No No No   Have you had close contact with someone who has traveled outside the country in the past 30 days and is sick? No No No No No     Open Review Flowsheets      Orders Placed    None  Medication Changes      None  Medication List  Visit Diagnoses      Follow-up examination  Problem List

## 2025-02-27 ENCOUNTER — OFFICE VISIT (OUTPATIENT)
Dept: SURGERY | Facility: CLINIC | Age: 65
End: 2025-02-27
Payer: COMMERCIAL

## 2025-02-27 DIAGNOSIS — Z09 FOLLOW-UP EXAMINATION: Primary | ICD-10-CM

## 2025-02-27 DIAGNOSIS — S21.001A WOUND OF RIGHT BREAST, INITIAL ENCOUNTER: ICD-10-CM

## 2025-02-27 NOTE — PROGRESS NOTES
"Patient reports outer bandage did not need to be changed at all yesterday, other than the dressing change here in our clinic.    Daily Wound cares performed. Wound is packed with saline-soaked 2\" gauze roll, covered with dry dressings and tape as instructed.     Patient will follow up in clinic tomorrow, at 11 am.      Informed her that I am still working on finding home care so that she does not need to continue coming to the clinic daily.  For now, will schedule her every day, next week, at 11:00 am with PA and/or RN and patient will see Dr. Giron on Thursday next week, at 10:45 am    Lexi Weldon RN-BSN         "

## 2025-03-01 ENCOUNTER — HEALTH MAINTENANCE LETTER (OUTPATIENT)
Age: 65
End: 2025-03-01

## 2025-03-03 ENCOUNTER — OFFICE VISIT (OUTPATIENT)
Dept: SURGERY | Facility: CLINIC | Age: 65
End: 2025-03-03
Payer: COMMERCIAL

## 2025-03-03 DIAGNOSIS — S21.001A WOUND OF RIGHT BREAST, INITIAL ENCOUNTER: Primary | ICD-10-CM

## 2025-03-03 NOTE — PROGRESS NOTES
"GENERAL SURGERY OFFICE VISIT NOTE - PA STUDENT  Patient's Name: Nnea Tang  Today's Date: March 3,  2025    SUBJECTIVE  Nena Tang is here for a wound check and dressing change. She underwent a RFID tag localized right breast lumpectomy with SLN biopsy, RFID tag axillary node excision and oncoplastic closure by Dr. Giron on 1/27/25.  She unfortunately developed a wound infection and underwent I&D of the right breast wound with cultures. Wound has been making appropriate progress. Patient states she still experiences some drainage but not as much as before. Continues to have some tenderness but overall doing well. Mentioned that she noticed an odor coming from the wound but that this is not new.     OBJECTIVE  General: Well-developed, well-nourished, in no acute distress   Respiratory: Non-labored breathing  Wound: Dressings and packing removed from right breast wound with some discharge on dressing. Wound is widely open, no foul odor noted. Fibrous slough noted on wound edges circumferentially. Tunneling present superior-medial breast, minimal undermining superiorly and laterally but this is decreasing. Wound bed is quite clean today with healthy granulation tissue and scant areas of bleeding. Wound is approximately 4.5cm  x 3.5cm x 4.5cm deep. Irrigated with saline.     Procedure: Patient verbally consented for procedure. Excisional circumferential debridement of skin edges with 15-blade scalpel was performed. Wound edges with slight bleeding and hemostasis was achieved with gentle pressure and time. Wound was then packed with appropriate length of saline-dampened 2\" gauze roll, covered with dry dressings and secured with tape.    ASSESSMENT/PLAN  Nena Tang is a 65 year old female s/p right breast lumpectomy with postop wound infection s/p I&D. Open wound being packed, stable and making appropriate progress  - Continue to follow-up in clinic tomorrow with RN for daily dressing change.   - Home " care ordered for dressing changes at her facility. Awaiting for approved before decreasing frequency of visits to surgical clinic.  - Call with questions    Samara PRECIADO-Surgery        Patient seen concurrently with student. All care provided personally or under direct advisement and supervision.  Agree with the above note.    Salvador Christine PA-C  Office: [phone]  Pager: [phone]

## 2025-03-04 ENCOUNTER — OFFICE VISIT (OUTPATIENT)
Dept: SURGERY | Facility: CLINIC | Age: 65
End: 2025-03-04
Payer: COMMERCIAL

## 2025-03-04 ENCOUNTER — ONCOLOGY VISIT (OUTPATIENT)
Dept: ONCOLOGY | Facility: CLINIC | Age: 65
End: 2025-03-04
Attending: INTERNAL MEDICINE
Payer: COMMERCIAL

## 2025-03-04 VITALS
BODY MASS INDEX: 37.36 KG/M2 | HEIGHT: 62 IN | WEIGHT: 203 LBS | RESPIRATION RATE: 16 BRPM | SYSTOLIC BLOOD PRESSURE: 119 MMHG | OXYGEN SATURATION: 97 % | DIASTOLIC BLOOD PRESSURE: 78 MMHG | HEART RATE: 69 BPM

## 2025-03-04 DIAGNOSIS — Z17.0 MALIGNANT NEOPLASM OF LOWER-INNER QUADRANT OF RIGHT BREAST OF FEMALE, ESTROGEN RECEPTOR POSITIVE (H): ICD-10-CM

## 2025-03-04 DIAGNOSIS — C50.311 MALIGNANT NEOPLASM OF LOWER-INNER QUADRANT OF RIGHT BREAST OF FEMALE, ESTROGEN RECEPTOR POSITIVE (H): ICD-10-CM

## 2025-03-04 DIAGNOSIS — Z09 FOLLOW-UP EXAMINATION: Primary | ICD-10-CM

## 2025-03-04 DIAGNOSIS — C50.911 INVASIVE DUCTAL CARCINOMA OF BREAST, FEMALE, RIGHT (H): ICD-10-CM

## 2025-03-04 DIAGNOSIS — Z79.811 LONG TERM (CURRENT) USE OF AROMATASE INHIBITORS: Primary | ICD-10-CM

## 2025-03-04 PROCEDURE — 99207 PR NO CHARGE NURSE ONLY: CPT

## 2025-03-04 PROCEDURE — 99215 OFFICE O/P EST HI 40 MIN: CPT | Performed by: INTERNAL MEDICINE

## 2025-03-04 PROCEDURE — G0463 HOSPITAL OUTPT CLINIC VISIT: HCPCS | Performed by: INTERNAL MEDICINE

## 2025-03-04 PROCEDURE — G2211 COMPLEX E/M VISIT ADD ON: HCPCS | Performed by: INTERNAL MEDICINE

## 2025-03-04 RX ORDER — ANASTROZOLE 1 MG/1
1 TABLET ORAL DAILY
Qty: 90 TABLET | Refills: 3 | Status: SHIPPED | OUTPATIENT
Start: 2025-03-04

## 2025-03-04 RX ORDER — LEFLUNOMIDE 20 MG/1
TABLET ORAL
Qty: 30 TABLET | Refills: 1 | Status: SHIPPED | OUTPATIENT
Start: 2025-03-04

## 2025-03-04 ASSESSMENT — PAIN SCALES - GENERAL: PAINLEVEL_OUTOF10: NO PAIN (0)

## 2025-03-04 NOTE — PROGRESS NOTES
[facility]    Hematology/Oncology return patient note      Today's Date:  March 4, 2025  Reason for Consult: breast cancer       HISTORY OF PRESENT ILLNESS: Nena is a 64 year old female with the following oncologic history:    1 Screening mammogram in December 2024 showed calcifications in the right upper outer breast posterior depth  2 right breast ultrasound showed 1.7 cm asymmetry with 1 abnormal ultarsound   3 biopsy showed invasive ductal carcinoma with mucinous features efrem grade 2 of 3.   Lymph node right axillary metastatic caricoma in one lymph node with almost complete replacement ER/MS + her 2 mikie negative IHC 0, ki 67 8%    Lives in a group home, multiple co morbidities. Her nurse is with her today.  She is much more functional and walks short distances.  Diabetic but blood blood sugars are doing better.     Interval history: Ania returns for follow-up today.she had right breast lumpectomy on 1/27/2025 final pathology showed a grade 3 invasive ductal carcinoma 2.5 cm with no evidence of lymphovascular invasion 3 out of 6 lymph nodes positive for metastatic carcinoma.  Margins negative.  The tumor was estrogen receptor positive progesterone receptor positive HER2/mikie negative      REVIEW OF SYSTEMS:   14 point ROS was reviewed and is negative other than as noted above in HPI.       HOME MEDICATIONS:  Current Outpatient Medications   Medication Sig Dispense Refill    acetaminophen (TYLENOL) 500 MG tablet Take 2 tablets (1,000 mg) by mouth 3 times daily. 168 tablet 4    albuterol (PROAIR HFA/PROVENTIL HFA/VENTOLIN HFA) 108 (90 Base) MCG/ACT inhaler Inhale 2 puffs into the lungs every 6 hours as needed for shortness of breath, wheezing or cough 18 g 3    Alcohol Swabs (EASY TOUCH ALCOHOL PREP MEDIUM) 70 % PADS APPLY 1 UNITS TOPICALLY 8 TIMES DAILY 200 each 3    alendronate (FOSAMAX) 70 MG tablet TAKE 1 TABLET BY MOUTH EVERY 7 DAYS 4 tablet 11    amantadine (SYMMETREL) 100 MG  capsule TAKE 1 CAPSULE BY MOUTH DAILY 28 capsule 11    amLODIPine (NORVASC) 10 MG tablet Take 1 tablet (10 mg) by mouth every morning. 30 tablet 11    ASPIRIN LOW DOSE 81 MG EC tablet TAKE 1 TABLET BY MOUTH DAILY 28 tablet 11    atorvastatin (LIPITOR) 80 MG tablet TAKE 1 TABLET BY MOUTH DAILY 28 tablet 5    blood glucose (ONETOUCH ULTRA) test strip USE TO TEST BLOOD SUGAR 10 TIMES DAILY OR AS DIRECTED. 300 strip 11    buPROPion (WELLBUTRIN XL) 150 MG 24 hr tablet Take 1 tablet (150 mg) by mouth every morning. 30 tablet 11    calcium carbonate (OS-ALLEN) 1500 (600 Ca) MG tablet TAKE 1 TABLET BY MOUTH DAILY 28 tablet 5    calcium polycarbophil (FIBERCON) 625 MG tablet Take 2 tablets (1,250 mg) by mouth twice a week. Take (Wednesday) with Ozempic and (Thursday) day after Ozempic.  Discontinue if diarrhea WORSE.  Consoder also giving Tuesday, day before if reduces loose stool, but not resolved. 30 tablet 3    chlorhexidine (PERIDEX) 0.12 % solution Swish and spit 10 mLs in mouth 2 times daily 1893 mL 3    Cholecalciferol (D3 HIGH POTENCY) 25 MCG (1000 UT) CAPS TAKE 2 CAPSULES BY MOUTH DAILY 180 capsule 2    clonazePAM (KLONOPIN) 0.5 MG tablet TAKE 1 TABLET BY MOUTH AT BEDTIME 28 tablet 5    Continuous Glucose Sensor (DEXCOM G6 SENSOR) MISC Change every 10 days. 9 each 4    Continuous Glucose Transmitter (DEXCOM G6 TRANSMITTER) MISC Change every 3 months. 1 each 4    diclofenac (VOLTAREN) 1 % topical gel APPLY 4 GRAMS TO PAINFUL AREA AT BEDTIME . MAY USE AN ADDITIONAL 3 DOSES DURING 100 g 1    gabapentin (NEURONTIN) 300 MG capsule TAKE 1 CAPSULE BY MOUTH AT BEDTIME 28 capsule 11    HUMALOG KWIKPEN 100 UNIT/ML soln Inject 0-12 Units subcutaneously 4 times daily (with meals and nightly) Before meals: BG 81 - 150:  0 units 151 - 200: 2 units, 201 - 250: 4 units, 251-300: 6 units, 301 - 350: 8 units, 351 or greater : 10 units At BEDTIME-If >4 hours since last Humalog injection For  - 249 give 1 units, 250 - 299 give 2  units,300 - 349 give 3 units, = or > 350 give 4 units. 15 mL 1    HYDROcodone-acetaminophen (NORCO) 5-325 MG tablet Take 1 tablet by mouth every 6 hours as needed for severe pain. 10 tablet 0    hydrOXYzine (VISTARIL) 25 MG capsule TAKE 1 CAPSULE BY MOUTH EVERY NIGHT AT BEDTIME ANXIETY/SLEEP 28 capsule 0    ibuprofen (ADVIL/MOTRIN) 200 MG tablet Take 200 mg by mouth every 4 hours as needed for pain      insulin glargine (LANTUS SOLOSTAR) 100 UNIT/ML pen Inject 22 Units subcutaneously daily. 45 mL 1    insulin pen needle (BD AUTOSHIELD DUO) 30G X 5 MM USE 6 DAILY OR AS DIRECTED 200 each 10    Lancets (ONETOUCH DELICA PLUS OLHXYL31A) MISC 1 EACH AS NEEDED  USE TO TEST BLOOD SUGARS 1-2 TIMES DAILY AS DIRECTED 100 each 3    leflunomide (ARAVA) 20 MG tablet TAKE 1 TABLET BY MOUTH DAILY *LABS EVERY 8-12 WEEKS 30 tablet 1    Lidocaine (LIDOCAINE PAIN RELIEF) 4 % Patch APPLY 1 PATCH ONTO SKIN EVERY 24 HOURS ; APPLY AT NIGHT AND REMOVE IN THE MORNING ( TWELVE HOURS PATCH FREE) 30 patch 11    loperamide (IMODIUM) 2 MG capsule TAKE 1 CAPSULE BY MOUTH FOUR TIMES A DAY AS NEEDED FOR DIARRHEA 30 capsule 1    losartan (COZAAR) 100 MG tablet TAKE 1 TABLET BY MOUTH DAILY 28 tablet 11    Magnesium Oxide 250 MG TABS Take 1 tablet (250 mg) by mouth at bedtime. 28 tablet 11    melatonin 5 MG tablet Take 5 mg by mouth at bedtime      metoprolol succinate ER (TOPROL XL) 50 MG 24 hr tablet TAKE 1 TABLET BY MOUTH IN THE MORNING AND TAKE 2 TABLETS AT BEDTIME 84 tablet 11    mirabegron (MYRBETRIQ) 50 MG 24 hr tablet Take 1 tablet (50 mg) by mouth daily. 90 tablet 5    naproxen (NAPROSYN) 375 MG tablet Take 1 tablet (375 mg) by mouth 2 times daily as needed for moderate pain (denatl pain) 60 tablet 11    NYAMYC 460968 UNIT/GM external powder APPLY TOPICALLY TWICE A DAY AS NEEDED 60 g 0    ondansetron (ZOFRAN) 4 MG tablet TAKE 1 TABLET BY MOUTH EVERY 8 HOURS AS NEEDED FOR NAUSEA 10 tablet 1    order for DME Equipment being ordered: Depends. 30  each 4    order for DME Equipment being ordered: CPAP Supplies. 1 each 0    oxyCODONE (ROXICODONE) 5 MG tablet Take 1 tablet (5 mg) by mouth every 6 hours as needed for moderate to severe pain. 8 tablet 0    paliperidone ER (INVEGA) 6 MG 24 hr tablet TAKE 2 TABLETS BY MOUTH EVERY NIGHT AT BEDTIME 60 tablet 11    pantoprazole (PROTONIX) 40 MG EC tablet TAKE 1 TABLET (40 MG) BY MOUTH DAILY 28 tablet 11    prazosin (MINIPRESS) 1 MG capsule Take 1 mg by mouth at bedtime      QUEtiapine (SEROQUEL) 100 MG tablet Take 100 mg by mouth at bedtime      senna-docusate (SENOKOT S) 8.6-50 MG tablet Take 1 tablet by mouth daily as needed for constipation. 30 tablet 11    senna-docusate (SENOKOT-S/PERICOLACE) 8.6-50 MG tablet Take 1 tablet by mouth 2 times daily as needed for constipation (While on oral opioids.). 10 tablet 0    sertraline (ZOLOFT) 100 MG tablet Take 100 mg by mouth daily      Tirzepatide 7.5 MG/0.5ML SOAJ Inject 0.5 mLs (7.5 mg) subcutaneously every 7 days. 2 mL 11    traZODone (DESYREL) 100 MG tablet TAKE 1 TABLET BY MOUTH AT BEDTIME 28 tablet 11    vitamin C (ASCORBIC ACID) 500 MG tablet TAKE 2 TABLETS BY MOUTH IN THE MORNING AND TAKE 2 TABLETS BY MOUTH IN THE EVENING 180 tablet 1    zinc oxide (DESITIN) 20 % external ointment Apply topically 3 times daily as needed for irritation (barrier cream) 60 g 3         ALLERGIES:  Allergies   Allergen Reactions    Imidazole Antifungals Hives     Tolerates diflucan    Ketoprofen Itching     Pruritis to topical    Lisinopril Hives    Metformin Other (See Comments)     Patient hospitalized for lactic acidosis - admitting provider suspectd caused by metformin    Metronidazole Hives    Penicillins     Posaconazole Hives     Tolerates diflucan         PAST MEDICAL HISTORY:  Past Medical History:   Diagnosis Date    Acute respiratory failure with hypoxia (H) 09/04/2017    CAD (coronary artery disease)     5/2014 cath, nonbostructive stenosis to LAD, RCA.    Chronic low back  pain 01/22/2013    Cocaine abuse, in remission (H)     Fecal urgency 03/08/2012    History of heroin abuse (H)     Hyperlipidemia LDL goal <100 10/31/2010    Hypertension 07/29/2013    Illiterate 08/30/2011    Irritable bowel syndrome     Left cataract     Migraine 04/19/2012    Migraine headache 04/22/2013    Moderate major depression (H) 06/08/2011    Noncompliance with medication regimen 06/08/2011    Obesity     CINDY (obstructive sleep apnea) 03/08/2012    does not use CPAP    CINDY (obstructive sleep apnea)- mild AHI 10.3     Osteopenia 10/07/2009    Pneumonia     Pneumonia of right lower lobe due to infectious organism 09/04/2017    Schizoaffective disorder, depressive type (H) 02/25/2013    Sepsis (H) 08/29/2017    Suicidal intent 10/02/2013    Takotsubo cardiomyopathy     Type 2 diabetes mellitus (H) 08/30/2011    Uterine cancer (H) 1983    Verbal auditory hallucination 10/04/2012         PAST SURGICAL HISTORY:  Past Surgical History:   Procedure Laterality Date    BIOPSY NODE SENTINEL Right 1/27/2025    Procedure: radiofrequency localized right axillary lymph node excision, right axillary sentinel lymph node biopsy;  Surgeon: Albino Giron MD;  Location: [facility] OR    CATARACT IOL, RT/LT Bilateral 2017    CHOLECYSTECTOMY      COLONOSCOPY N/A 03/16/2017    Procedure: COLONOSCOPY;  Surgeon: Traci Gonzalez MD;  Location: [facility]    Coronary CTA  05/21/2014    HYSTERECTOMY  1983    uterine cancer yearly pap's per provider.    LAPAROSCOPIC CHOLECYSTECTOMY  2008    LUMPECTOMY, BREAST, LOCALIZED USING RADIOFREQUENCY IDENTIFICATION Right 1/27/2025    Procedure: Radiofrequency localized right partial mastectomy;  Surgeon: Albino Giron MD;  Location: [facility] OR    PHACOEMULSIFICATION CLEAR CORNEA WITH STANDARD INTRAOCULAR LENS IMPLANT Left 05/05/2017    Procedure: PHACOEMULSIFICATION CLEAR CORNEA WITH STANDARD INTRAOCULAR LENS IMPLANT;  LEFT EYE PHACOEMULSIFICATION CLEAR CORNEA WITH STANDARD  INTRAOCULAR LENS IMPLANT ;  Surgeon: Tyra Diaz MD;  Location:  [facility]    PHACOEMULSIFICATION CLEAR CORNEA WITH STANDARD INTRAOCULAR LENS IMPLANT Right 06/30/2017    Procedure: PHACOEMULSIFICATION CLEAR CORNEA WITH STANDARD INTRAOCULAR LENS IMPLANT;  RIGHT EYE PHACOEMULSIFICATION CLEAR CORNEA WITH STANDARD INTRAOCULAR LENS IMPLANT;  Surgeon: Tyra Diaz MD;  Location:  [facility]    RELEASE TRIGGER FINGER  10/11/2012    Left thumb. Procedure: RELEASE TRIGGER FINGER;  LEFT THUMB TRIGGER RELEASE;  Surgeon: Tay Langley MD;  Location:  [facility]    RELEASE TRIGGER FINGER Right 12/26/2016    Procedure: RELEASE TRIGGER FINGER;  Surgeon: Albino Castañeda MD;  Location:  OR    [facility] OOPHORECTORMY FOR MALIG, W/BX  1983    UTERINE         SOCIAL HISTORY:  Social History     Socioeconomic History    Marital status:      Spouse name: Not on file    Number of children: 2    Years of education: Not on file    Highest education level: Not on file   Occupational History    Not on file   Tobacco Use    Smoking status: Never    Smokeless tobacco: Never   Vaping Use    Vaping status: Never Used   Substance and Sexual Activity    Alcohol use: Not Currently     Comment: when younger    Drug use: No     Comment: history of    Sexual activity: Not Currently     Partners: Male     Birth control/protection: Post-menopausal   Other Topics Concern    Parent/sibling w/ CABG, MI or angioplasty before 65F 55M? Not Asked     Service No    Blood Transfusions No    Caffeine Concern No    Occupational Exposure No    Hobby Hazards No    Sleep Concern Yes    Stress Concern Yes    Weight Concern Yes    Special Diet Yes     Comment: DM    Back Care Yes    Exercise Yes     Comment: WALKS DAILY    Bike Helmet Not Asked    Seat Belt Yes    Self-Exams No     Comment: ENCOURAGED   Social History Narrative     10/2014. Has 2 sons. 7 grandchildren.         Unemployed. Graduated HS.         Tobacco use: Denies    Alcohol use:  Escalated use since   10/2014    Drug: Denies     Social Drivers of Health     Financial Resource Strain: Low Risk  (2025)    [facility]    Financial Resource Strain     Difficulty of Paying Living Expenses: 3     Difficulty of Paying Living Expenses: Not on file   Food Insecurity: No Food Insecurity (2024)    [facility] from [facility]    Food Insecurity     Do you worry your food will run out before you are able to buy more?: 1   Transportation Needs: No Transportation Needs (2024)    Received from [facility]    Transportation Needs     Does lack of transportation keep you from medical appointments?: 1     Does lack of transportation keep you from work, meetings or getting things that you need?: 1   Physical Activity: Insufficiently Active (3/12/2024)    Exercise Vital Sign     Days of Exercise per Week: 2 days     Minutes of Exercise per Session: 30 min   Stress: No Stress Concern Present (3/12/2024)    [employer] - Occupational Stress Questionnaire     Feeling of Stress : Only a little   Social Connections: Socially Integrated (2024)    Received from [facility]    Social Connections     Do you often feel lonely or isolated from those around you?: 0   Interpersonal Safety: Low Risk  (2025)    Interpersonal Safety     Do you feel physically and emotionally safe where you currently live?: Yes     Within the past 12 months, have you been hit, slapped, kicked or otherwise physically hurt by someone?: No     Within the past 12 months, have you been humiliated or emotionally abused in other ways by your partner or ex-partner?: No   Housing Stability: Low Risk  (2024)    Received from [facility]    Housing Stability     What is your housing situation today?: 1  "        FAMILY HISTORY:  Family History   Problem Relation Age of Onset    Cancer Mother         BLADDER    Respiratory Mother         COPD    Gastrointestinal Disease Mother         CIRRHOSIS OF LI BOLIVAR    Alcohol/Drug Mother     Diabetes Mother     Hypertension Mother     Lipids Mother     C.A.D. Mother     Glaucoma Mother     Alcohol/Drug Sister     Mental Illness Sister     Alcohol/Drug Sister     Psychotic Disorder Sister     Cancer Maternal Grandmother         UNKNOWN TYPE    Cancer Brother         COLON    Cancer - colorectal Brother         IN HIS LATE 30S    Alcohol/Drug Brother          OF HEROIN OVERDOSE AT AGE 22 YRS    Macular Degeneration No family hx of          PHYSICAL EXAM:  Vital signs:  /78 (BP Location: Left arm, Patient Position: Sitting, Cuff Size: Adult Large)   Pulse 69   Resp 16   Ht 1.575 m (5' 2\")   Wt 92.1 kg (203 lb)   LMP 2015 (Exact Date)   SpO2 97%   BMI 37.13 kg/m     [facility]   Right breast s/p lumpectomy surigcal area weeping with good granulation tissue. Left breast normal      LABS:  None    PATHOLOGY:  As per interval history    IMAGING:  Noted dexa shows osteoperosis    ASSESSMENT/PLAN:  Nena Tang is a 65 year old female with newly diagnosed breast cancer  ER+, with christiano involvement     She is not a candidate for any chemotherapy Discussed starting anastrozole.  She will also need adjuvant Zometa due to osteoporosis with her bone density    --start anastrazole  -return to surgery for further post op management  --radiation consult  --zometa for the bones  --4 month follow up with MD     Total time spent on day of visit, including review of tests, obtaining/reviewing separately obtained history, ordering medications/tests/procedures, communicating with PCP/consultants, and documenting in electronic medical record: 45  minutes             Xiang Baca MD  Hematology/Oncology  [facility]   "

## 2025-03-04 NOTE — NURSING NOTE
"Daily wound cares performed. Wound is packed with saline-soaked 2\" gauze roll, covered with dry dressings and tape as instructed.      Patient reports she has had more drainage lately and has been having to change the outside dressing more frequently.     Patient will follow up in clinic tomorrow with RN and has an appointment with Dr. Giron on Thursday.     Patient saw Dr. Baca today and states she had a good visit.      Richa Salazar, RN, BSN, PHN  Breast Care Nurse Coordinator  [facility] [place] [place]  "

## 2025-03-04 NOTE — LETTER
"3/4/2025      Nena Tang  [address] [facility] [address]      Dear Colleague,    Thank you for referring your patient, Nena Tang, to the [facility]. Please see a copy of my visit note below.    Oncology Rooming Note    March 4, 2025 10:54 AM   Nena Tang is a 65 year old female who presents for:    Chief Complaint   Patient presents with     Oncology Clinic Visit     Invasive ductal carcinoma of breast, female, right (H)     Initial Vitals: /78 (BP Location: Left arm, Patient Position: Sitting, Cuff Size: Adult Large)   Pulse 69   Resp 16   Ht 1.575 m (5' 2\")   Wt 92.1 kg (203 lb)   LMP 01/06/2015 (Exact Date)   SpO2 97%   BMI 37.13 kg/m   Estimated body mass index is 37.13 kg/m  as calculated from the following:    Height as of this encounter: 1.575 m (5' 2\").    Weight as of this encounter: 92.1 kg (203 lb). Body surface area is 2.01 meters squared.  No Pain (0) Comment: Data Unavailable   Patient's last menstrual period was 01/06/2015 (exact date).  Allergies reviewed: Yes  Medications reviewed: Yes    Medications: Medication refills not needed today.  Pharmacy name entered into [employer]:    [facility] - [place], [place] - [address] [place] [address] - [address] [facility] #[id number] - [place], [place] - [address] AT [facility] [address]    Frailty Screening:   Is the patient here for a new oncology consult visit in cancer care? 2. No    PHQ9:  Did this patient require a PHQ9?: No      Clinical concerns: None       Belen Sen MA              [facility]    Hematology/Oncology return patient note      Today's Date:  March 4, 2025  Reason for Consult: breast cancer       HISTORY OF PRESENT ILLNESS: Nena is a 64 year old female with the following oncologic history:    1 Screening mammogram in December 2024 showed calcifications in the right upper " outer breast posterior depth  2 right breast ultrasound showed 1.7 cm asymmetry with 1 abnormal ultarsound   3 biopsy showed invasive ductal carcinoma with mucinous features efrem grade 2 of 3.   Lymph node right axillary metastatic caricoma in one lymph node with almost complete replacement ER/WA + her 2 mikie negative IHC 0, ki 67 8%    Lives in a group home, multiple co morbidities. Her nurse is with her today.  She is much more functional and walks short distances.  Diabetic but blood blood sugars are doing better.     Interval history: Ania returns for follow-up today.she had right breast lumpectomy on 1/27/2025 final pathology showed a grade 3 invasive ductal carcinoma 2.5 cm with no evidence of lymphovascular invasion 3 out of 6 lymph nodes positive for metastatic carcinoma.  Margins negative.  The tumor was estrogen receptor positive progesterone receptor positive HER2/mikie negative      REVIEW OF SYSTEMS:   14 point ROS was reviewed and is negative other than as noted above in HPI.       HOME MEDICATIONS:  Current Outpatient Medications   Medication Sig Dispense Refill     acetaminophen (TYLENOL) 500 MG tablet Take 2 tablets (1,000 mg) by mouth 3 times daily. 168 tablet 4     albuterol (PROAIR HFA/PROVENTIL HFA/VENTOLIN HFA) 108 (90 Base) MCG/ACT inhaler Inhale 2 puffs into the lungs every 6 hours as needed for shortness of breath, wheezing or cough 18 g 3     Alcohol Swabs (EASY TOUCH ALCOHOL PREP MEDIUM) 70 % PADS APPLY 1 UNITS TOPICALLY 8 TIMES DAILY 200 each 3     alendronate (FOSAMAX) 70 MG tablet TAKE 1 TABLET BY MOUTH EVERY 7 DAYS 4 tablet 11     amantadine (SYMMETREL) 100 MG capsule TAKE 1 CAPSULE BY MOUTH DAILY 28 capsule 11     amLODIPine (NORVASC) 10 MG tablet Take 1 tablet (10 mg) by mouth every morning. 30 tablet 11     ASPIRIN LOW DOSE 81 MG EC tablet TAKE 1 TABLET BY MOUTH DAILY 28 tablet 11     atorvastatin (LIPITOR) 80 MG tablet TAKE 1 TABLET BY MOUTH DAILY 28 tablet 5     blood glucose  (ONETOUCH ULTRA) test strip USE TO TEST BLOOD SUGAR 10 TIMES DAILY OR AS DIRECTED. 300 strip 11     buPROPion (WELLBUTRIN XL) 150 MG 24 hr tablet Take 1 tablet (150 mg) by mouth every morning. 30 tablet 11     calcium carbonate (OS-ALLEN) 1500 (600 Ca) MG tablet TAKE 1 TABLET BY MOUTH DAILY 28 tablet 5     calcium polycarbophil (FIBERCON) 625 MG tablet Take 2 tablets (1,250 mg) by mouth twice a week. Take (Wednesday) with Ozempic and (Thursday) day after Ozempic.  Discontinue if diarrhea WORSE.  Consoder also giving Tuesday, day before if reduces loose stool, but not resolved. 30 tablet 3     chlorhexidine (PERIDEX) 0.12 % solution Swish and spit 10 mLs in mouth 2 times daily 1893 mL 3     Cholecalciferol (D3 HIGH POTENCY) 25 MCG (1000 UT) CAPS TAKE 2 CAPSULES BY MOUTH DAILY 180 capsule 2     clonazePAM (KLONOPIN) 0.5 MG tablet TAKE 1 TABLET BY MOUTH AT BEDTIME 28 tablet 5     Continuous Glucose Sensor (DEXCOM G6 SENSOR) MISC Change every 10 days. 9 each 4     Continuous Glucose Transmitter (DEXCOM G6 TRANSMITTER) MISC Change every 3 months. 1 each 4     diclofenac (VOLTAREN) 1 % topical gel APPLY 4 GRAMS TO PAINFUL AREA AT BEDTIME . MAY USE AN ADDITIONAL 3 DOSES DURING 100 g 1     gabapentin (NEURONTIN) 300 MG capsule TAKE 1 CAPSULE BY MOUTH AT BEDTIME 28 capsule 11     HUMALOG KWIKPEN 100 UNIT/ML soln Inject 0-12 Units subcutaneously 4 times daily (with meals and nightly) Before meals: BG 81 - 150:  0 units 151 - 200: 2 units, 201 - 250: 4 units, 251-300: 6 units, 301 - 350: 8 units, 351 or greater : 10 units At BEDTIME-If >4 hours since last Humalog injection For  - 249 give 1 units, 250 - 299 give 2 units,300 - 349 give 3 units, = or > 350 give 4 units. 15 mL 1     HYDROcodone-acetaminophen (NORCO) 5-325 MG tablet Take 1 tablet by mouth every 6 hours as needed for severe pain. 10 tablet 0     hydrOXYzine (VISTARIL) 25 MG capsule TAKE 1 CAPSULE BY MOUTH EVERY NIGHT AT BEDTIME ANXIETY/SLEEP 28 capsule 0      ibuprofen (ADVIL/MOTRIN) 200 MG tablet Take 200 mg by mouth every 4 hours as needed for pain       insulin glargine (LANTUS SOLOSTAR) 100 UNIT/ML pen Inject 22 Units subcutaneously daily. 45 mL 1     insulin pen needle (BD AUTOSHIELD DUO) 30G X 5 MM USE 6 DAILY OR AS DIRECTED 200 each 10     Lancets (ONETOUCH DELICA PLUS [id number]) MISC 1 EACH AS NEEDED  USE TO TEST BLOOD SUGARS 1-2 TIMES DAILY AS DIRECTED 100 each 3     leflunomide (ARAVA) 20 MG tablet TAKE 1 TABLET BY MOUTH DAILY *LABS EVERY 8-12 WEEKS 30 tablet 1     Lidocaine (LIDOCAINE PAIN RELIEF) 4 % Patch APPLY 1 PATCH ONTO SKIN EVERY 24 HOURS ; APPLY AT NIGHT AND REMOVE IN THE MORNING ( TWELVE HOURS PATCH FREE) 30 patch 11     loperamide (IMODIUM) 2 MG capsule TAKE 1 CAPSULE BY MOUTH FOUR TIMES A DAY AS NEEDED FOR DIARRHEA 30 capsule 1     losartan (COZAAR) 100 MG tablet TAKE 1 TABLET BY MOUTH DAILY 28 tablet 11     Magnesium Oxide 250 MG TABS Take 1 tablet (250 mg) by mouth at bedtime. 28 tablet 11     melatonin 5 MG tablet Take 5 mg by mouth at bedtime       metoprolol succinate ER (TOPROL XL) 50 MG 24 hr tablet TAKE 1 TABLET BY MOUTH IN THE MORNING AND TAKE 2 TABLETS AT BEDTIME 84 tablet 11     mirabegron (MYRBETRIQ) 50 MG 24 hr tablet Take 1 tablet (50 mg) by mouth daily. 90 tablet 5     naproxen (NAPROSYN) 375 MG tablet Take 1 tablet (375 mg) by mouth 2 times daily as needed for moderate pain (denatl pain) 60 tablet 11     NYAMYC 058921 UNIT/GM external powder APPLY TOPICALLY TWICE A DAY AS NEEDED 60 g 0     ondansetron (ZOFRAN) 4 MG tablet TAKE 1 TABLET BY MOUTH EVERY 8 HOURS AS NEEDED FOR NAUSEA 10 tablet 1     order for DME Equipment being ordered: Depends. 30 each 4     order for DME Equipment being ordered: CPAP Supplies. 1 each 0     oxyCODONE (ROXICODONE) 5 MG tablet Take 1 tablet (5 mg) by mouth every 6 hours as needed for moderate to severe pain. 8 tablet 0     paliperidone ER (INVEGA) 6 MG 24 hr tablet TAKE 2 TABLETS BY MOUTH EVERY NIGHT AT  BEDTIME 60 tablet 11     pantoprazole (PROTONIX) 40 MG EC tablet TAKE 1 TABLET (40 MG) BY MOUTH DAILY 28 tablet 11     prazosin (MINIPRESS) 1 MG capsule Take 1 mg by mouth at bedtime       QUEtiapine (SEROQUEL) 100 MG tablet Take 100 mg by mouth at bedtime       senna-docusate (SENOKOT S) 8.6-50 MG tablet Take 1 tablet by mouth daily as needed for constipation. 30 tablet 11     senna-docusate (SENOKOT-S/PERICOLACE) 8.6-50 MG tablet Take 1 tablet by mouth 2 times daily as needed for constipation (While on oral opioids.). 10 tablet 0     sertraline (ZOLOFT) 100 MG tablet Take 100 mg by mouth daily       Tirzepatide 7.5 MG/0.5ML SOAJ Inject 0.5 mLs (7.5 mg) subcutaneously every 7 days. 2 mL 11     traZODone (DESYREL) 100 MG tablet TAKE 1 TABLET BY MOUTH AT BEDTIME 28 tablet 11     vitamin C (ASCORBIC ACID) 500 MG tablet TAKE 2 TABLETS BY MOUTH IN THE MORNING AND TAKE 2 TABLETS BY MOUTH IN THE EVENING 180 tablet 1     zinc oxide (DESITIN) 20 % external ointment Apply topically 3 times daily as needed for irritation (barrier cream) 60 g 3         ALLERGIES:  Allergies   Allergen Reactions     Imidazole Antifungals Hives     Tolerates diflucan     Ketoprofen Itching     Pruritis to topical     Lisinopril Hives     Metformin Other (See Comments)     Patient hospitalized for lactic acidosis - admitting provider suspectd caused by metformin     Metronidazole Hives     Penicillins      Posaconazole Hives     Tolerates diflucan         PAST MEDICAL HISTORY:  Past Medical History:   Diagnosis Date     Acute respiratory failure with hypoxia (H) 09/04/2017     CAD (coronary artery disease)     5/2014 cath, nonbostructive stenosis to LAD, RCA.     Chronic low back pain 01/22/2013     Cocaine abuse, in remission (H)      Fecal urgency 03/08/2012     History of heroin abuse (H)      Hyperlipidemia LDL goal <100 10/31/2010     Hypertension 07/29/2013     Illiterate 08/30/2011     Irritable bowel syndrome      Left cataract       Migraine 04/19/2012     Migraine headache 04/22/2013     Moderate major depression (H) 06/08/2011     Noncompliance with medication regimen 06/08/2011     Obesity      CINDY (obstructive sleep apnea) 03/08/2012    does not use CPAP     CINDY (obstructive sleep apnea)- mild AHI 10.3      Osteopenia 10/07/2009     Pneumonia      Pneumonia of right lower lobe due to infectious organism 09/04/2017     Schizoaffective disorder, depressive type (H) 02/25/2013     Sepsis (H) 08/29/2017     Suicidal intent 10/02/2013     Takotsubo cardiomyopathy      Type 2 diabetes mellitus (H) 08/30/2011     Uterine cancer (H) 1983     Verbal auditory hallucination 10/04/2012         PAST SURGICAL HISTORY:  Past Surgical History:   Procedure Laterality Date     BIOPSY NODE SENTINEL Right 1/27/2025    Procedure: radiofrequency localized right axillary lymph node excision, right axillary sentinel lymph node biopsy;  Surgeon: Albino Giron MD;  Location:  OR     CATARACT IOL, RT/LT Bilateral 2017     CHOLECYSTECTOMY       COLONOSCOPY N/A 03/16/2017    Procedure: COLONOSCOPY;  Surgeon: Traci Gonzalez MD;  Location: [facility]     Coronary CTA  05/21/2014     HYSTERECTOMY  1983    uterine cancer yearly pap's per provider.     LAPAROSCOPIC CHOLECYSTECTOMY  2008     LUMPECTOMY, BREAST, LOCALIZED USING RADIOFREQUENCY IDENTIFICATION Right 1/27/2025    Procedure: Radiofrequency localized right partial mastectomy;  Surgeon: Albino Giron MD;  Location:  OR     PHACOEMULSIFICATION CLEAR CORNEA WITH STANDARD INTRAOCULAR LENS IMPLANT Left 05/05/2017    Procedure: PHACOEMULSIFICATION CLEAR CORNEA WITH STANDARD INTRAOCULAR LENS IMPLANT;  LEFT EYE PHACOEMULSIFICATION CLEAR CORNEA WITH STANDARD INTRAOCULAR LENS IMPLANT ;  Surgeon: Tyra Diaz MD;  Location:  EC     PHACOEMULSIFICATION CLEAR CORNEA WITH STANDARD INTRAOCULAR LENS IMPLANT Right 06/30/2017    Procedure: PHACOEMULSIFICATION CLEAR CORNEA WITH STANDARD  INTRAOCULAR LENS IMPLANT;  RIGHT EYE PHACOEMULSIFICATION CLEAR CORNEA WITH STANDARD INTRAOCULAR LENS IMPLANT;  Surgeon: Tyra Diaz MD;  Location:  EC     RELEASE TRIGGER FINGER  10/11/2012    Left thumb. Procedure: RELEASE TRIGGER FINGER;  LEFT THUMB TRIGGER RELEASE;  Surgeon: Tay Langley MD;  Location:  SD     RELEASE TRIGGER FINGER Right 2016    Procedure: RELEASE TRIGGER FINGER;  Surgeon: Albino Castañeda MD;  Location:  OR     [facility] OOPHORECTORMY FOR MALIG, W/BX  1983    UTERINE         SOCIAL HISTORY:  Social History     Socioeconomic History     Marital status:      Spouse name: Not on file     Number of children: 2     Years of education: Not on file     Highest education level: Not on file   Occupational History     Not on file   Tobacco Use     Smoking status: Never     Smokeless tobacco: Never   Vaping Use     Vaping status: Never Used   Substance and Sexual Activity     Alcohol use: Not Currently     Comment: when younger     Drug use: No     Comment: history of     Sexual activity: Not Currently     Partners: Male     Birth control/protection: Post-menopausal   Other Topics Concern     Parent/sibling w/ CABG, MI or angioplasty before 65F 55M? Not Asked      Service No     Blood Transfusions No     Caffeine Concern No     Occupational Exposure No     Hobby Hazards No     Sleep Concern Yes     Stress Concern Yes     Weight Concern Yes     Special Diet Yes     Comment: DM     Back Care Yes     Exercise Yes     Comment: WALKS DAILY     Bike Helmet Not Asked     Seat Belt Yes     Self-Exams No     Comment: ENCOURAGED   Social History Narrative     10/2014. Has 2 sons. 7 grandchildren.         Unemployed. Graduated HS.         Tobacco use: Denies    Alcohol use: Escalated use since   10/2014    Drug: Denies     Social Drivers of Health     Financial Resource Strain: Low Risk  (2025)    Received from [facility]     Financial Resource Strain      Difficulty of Paying Living Expenses: 3      Difficulty of Paying Living Expenses: Not on file   Food Insecurity: No Food Insecurity (6/18/2024)    Received from [facility]    Food Insecurity      Do you worry your food will run out before you are able to buy more?: 1   Transportation Needs: No Transportation Needs (6/18/2024)    [facility] from [facility]    Transportation Needs      Does lack of transportation keep you from medical appointments?: 1      Does lack of transportation keep you from work, meetings or getting things that you need?: 1   Physical Activity: Insufficiently Active (3/12/2024)    Exercise Vital Sign      Days of Exercise per Week: 2 days      Minutes of Exercise per Session: 30 min   Stress: No Stress Concern Present (3/12/2024)    [employer] - Occupational Stress Questionnaire      Feeling of Stress : Only a little   Social Connections: Socially Integrated (6/18/2024)    Received from [facility]    Social Connections      Do you often feel lonely or isolated from those around you?: 0   Interpersonal Safety: Low Risk  (1/27/2025)    Interpersonal Safety      Do you feel physically and emotionally safe where you currently live?: Yes      Within the past 12 months, have you been hit, slapped, kicked or otherwise physically hurt by someone?: No      Within the past 12 months, have you been humiliated or emotionally abused in other ways by your partner or ex-partner?: No   Housing Stability: Low Risk  (6/18/2024)    Received from [facility]    Housing Stability      What is your housing situation today?: 1         FAMILY HISTORY:  Family History   Problem Relation Age of Onset     Cancer Mother         BLADDER     Respiratory Mother         COPD     Gastrointestinal Disease Mother         CIRRHOSIS OF LI  "BOLIVAR     Alcohol/Drug Mother      Diabetes Mother      Hypertension Mother      Lipids Mother      C.A.D. Mother      Glaucoma Mother      Alcohol/Drug Sister      Mental Illness Sister      Alcohol/Drug Sister      Psychotic Disorder Sister      Cancer Maternal Grandmother         UNKNOWN TYPE     Cancer Brother         COLON     Cancer - colorectal Brother         IN HIS LATE 30S     Alcohol/Drug Brother          OF HEROIN OVERDOSE AT AGE 22 YRS     Macular Degeneration No family hx of          PHYSICAL EXAM:  Vital signs:  /78 (BP Location: Left arm, Patient Position: Sitting, Cuff Size: Adult Large)   Pulse 69   Resp 16   Ht 1.575 m (5' 2\")   Wt 92.1 kg (203 lb)   LMP 2015 (Exact Date)   SpO2 97%   BMI 37.13 kg/m     ECO   Right breast s/p lumpectomy surigcal area weeping with good granulation tissue. Left breast normal      LABS:  None    PATHOLOGY:  As per interval history    IMAGING:  Noted dexa shows osteoperosis    ASSESSMENT/PLAN:  Nena Tang is a 65 year old female with newly diagnosed breast cancer  ER+, with christiano involvement     She is not a candidate for any chemotherapy Discussed starting anastrozole.  She will also need adjuvant Zometa due to osteoporosis with her bone density    --start anastrazole  -return to surgery for further post op management  --radiation consult  --zometa for the bones  --4 month follow up with MD     Total time spent on day of visit, including review of tests, obtaining/reviewing separately obtained history, ordering medications/tests/procedures, communicating with PCP/consultants, and documenting in electronic medical record: 45  minutes             Xiang Baca MD  Hematology/Oncology  [facility]       Again, thank you for allowing me to participate in the care of your patient.        Sincerely,        Xiang Baca MD    Electronically signed"

## 2025-03-04 NOTE — PROGRESS NOTES
"Oncology Rooming Note    March 4, 2025 10:54 AM   Nena Tang is a 65 year old female who presents for:    Chief Complaint   Patient presents with    Oncology Clinic Visit     Invasive ductal carcinoma of breast, female, right (H)     Initial Vitals: /78 (BP Location: Left arm, Patient Position: Sitting, Cuff Size: Adult Large)   Pulse 69   Resp 16   Ht 1.575 m (5' 2\")   Wt 92.1 kg (203 lb)   LMP 01/06/2015 (Exact Date)   SpO2 97%   BMI 37.13 kg/m   Estimated body mass index is 37.13 kg/m  as calculated from the following:    Height as of this encounter: 1.575 m (5' 2\").    Weight as of this encounter: 92.1 kg (203 lb). Body surface area is 2.01 meters squared.  No Pain (0) Comment: Data Unavailable   Patient's last menstrual period was 01/06/2015 (exact date).  Allergies reviewed: Yes  Medications reviewed: Yes    Medications: Medication refills not needed today.  Pharmacy name entered into [facility]:    [facility] - [place], [place] - [address] [place] [address] - [place], [place] - [address] [place] [address] DRUG STORE #[id number] - [place], [place] - [address] AT [facility] [address]    Frailty Screening:   Is the patient here for a new oncology consult visit in cancer care? 2. No    PHQ9:  Did this patient require a PHQ9?: No      Clinical concerns: None       Belen Sen MA            "

## 2025-03-04 NOTE — TELEPHONE ENCOUNTER
leflunomide (ARAVA) 20 MG tablet  1 ordered        Summary: TAKE 1 TABLET BY MOUTH DAILY *LABS EVERY 8-12 WEEKS, Disp-30 tablet, R-1, E-Prescribe  Guidelines: Start: 12/20/2024Ord/Sold: 12/20/2024 (O)Ordered On: 12/20/2024Pharmacy: [facility]-[id number] - [address] [address]ReportAdh: Dx Associated: Taking: Long-term: Med Note:              Patient Sig: TAKE 1 TABLET BY MOUTH DAILY *LABS EVERY 8-12 WEEKS  Ordering Department:  RHEUMATOLOGY  Authorized By: Alexus Posey MD  Dispense: 30 tablet      Last Office Visit: 11/26/24 [facility]  Future Office visit:  3/31/25    CBC RESULTS:   Recent Labs   Lab Test 01/21/25  1209   WBC 4.8   RBC 3.10*   HGB 9.7*   HCT 30.0*   MCV 97   MCH 31.3   MCHC 32.3   RDW 12.8          Creatinine   Date Value Ref Range Status   01/21/2025 0.92 0.51 - 0.95 mg/dL Final   06/15/2021 0.78 0.52 - 1.04 mg/dL Final   ]    Liver Function Studies -   Recent Labs   Lab Test 12/17/24  0907   PROTTOTAL 6.9   ALBUMIN 4.1   BILITOTAL 0.2   ALKPHOS 79   AST 21   ALT 20       Routing refill request to provider for review/approval because: DMARD not on refill protocol/Provider to authorize  Moraima MURPHY RN  [facility] Central Nursing/Red Flag Triage & Med Refill Team

## 2025-03-05 ENCOUNTER — OFFICE VISIT (OUTPATIENT)
Dept: SURGERY | Facility: CLINIC | Age: 65
End: 2025-03-05
Payer: COMMERCIAL

## 2025-03-05 DIAGNOSIS — Z09 FOLLOW-UP EXAMINATION: Primary | ICD-10-CM

## 2025-03-05 PROCEDURE — 99207 PR NO CHARGE NURSE ONLY: CPT

## 2025-03-05 NOTE — PROGRESS NOTES
Dressing change completed.  More drainage on ABD pad today than prior days.  Patient experiencing more pain/soreness today than prior days.  More blood in wound bed, than prior.      No odr    Flushed wound with 20 ml of saline prior to repacking with saline moistened bandage roll. Covered with 4x4 and ABD    Patient will follow up with Dr. Giron tomorrow at 10:45 am    Lexi Weldon RN-BSN

## 2025-03-06 ENCOUNTER — OFFICE VISIT (OUTPATIENT)
Dept: SURGERY | Facility: CLINIC | Age: 65
End: 2025-03-06
Payer: COMMERCIAL

## 2025-03-06 DIAGNOSIS — S21.001S BREAST WOUND, RIGHT, SEQUELA: Primary | ICD-10-CM

## 2025-03-06 NOTE — LETTER
March 6, 2025          Albino Rainey MD  [address]  [place] [address]      RE:   Nena Tang 1960      Dear Colleague,    Thank you for referring your patient, Nena Tang, to [facility]. Please see a copy of my visit note below.    Nena Tang presents today for follow up of her right breast wound.  This was inspected and re-packed.  Healthy granulation tissue at the base, no evidence of infection.  We will try to switch dressing changes to three times weekly.     Again, thank you for allowing me to participate in the care of your patient.      Sincerely,      Albino Giron MD

## 2025-03-06 NOTE — PROGRESS NOTES
Surgery Postop Note    Nena Tang presents today for follow up of her right breast wound.  This was inspected and re-packed.  Healthy granulation tissue at the base, no evidence of infection.  We will try to switch dressing changes to three times weekly. Thank you for the opportunity to help in her care.    Steve Giron M.D.  [facility]  [phone]    Please route or send letter to:  Primary Care Provider (PCP) and Referring Provider

## 2025-03-10 ENCOUNTER — OFFICE VISIT (OUTPATIENT)
Dept: SURGERY | Facility: CLINIC | Age: 65
End: 2025-03-10
Payer: COMMERCIAL

## 2025-03-10 DIAGNOSIS — S21.001S BREAST WOUND, RIGHT, SEQUELA: Primary | ICD-10-CM

## 2025-03-10 NOTE — PROGRESS NOTES
"GENERAL SURGERY OFFICE VISIT NOTE  Patient's Name: Nena Tang  Today's Date: March 10,  2025     SUBJECTIVE  Nena Tang is here for a wound check and dressing change. She underwent a RFID tag localized right breast lumpectomy with SLN biopsy, RFID tag axillary node excision and oncoplastic closure by Dr. Giron on 1/27/25.  She unfortunately developed a wound infection and underwent I&D of the right breast wound. Wound has been making appropriate progress. Patient states she still experiences some drainage but not as much as before. She does not report tenderness. Overall doing well.      OBJECTIVE    Wound: Dressings and packing removed from right breast wound with some discharge on dressing. Wound is widely open, no foul odor noted. Fibrous slough noted on wound edges circumferentially. Tunneling present superior-medial breast, minimal undermining superiorly and laterally but this is decreasing. Wound bed is quite clean today with healthy granulation tissue and scant areas of bleeding. Wound is approximately 4.5cm  x 3.5cm x 6cm deep. Irrigated with saline. Wound was then packed with appropriate length of saline-dampened 2\" gauze roll, covered with dry dressings and secured with tape.     ASSESSMENT/PLAN  Nena Tang is a 65 year old female s/p right breast lumpectomy with postop wound infection s/p I&D. Open wound being packed, stable and making appropriate progress  - Continue to follow-up in clinic for daily dressing change.   - Call with questions    Salvador Christine PA-C  Office: [phone]  Pager: [phone]    "

## 2025-03-11 ENCOUNTER — OFFICE VISIT (OUTPATIENT)
Dept: FAMILY MEDICINE | Facility: CLINIC | Age: 65
End: 2025-03-11
Attending: FAMILY MEDICINE
Payer: COMMERCIAL

## 2025-03-11 ENCOUNTER — OFFICE VISIT (OUTPATIENT)
Dept: PHARMACY | Facility: CLINIC | Age: 65
End: 2025-03-11
Payer: COMMERCIAL

## 2025-03-11 VITALS
OXYGEN SATURATION: 95 % | HEART RATE: 68 BPM | WEIGHT: 203.5 LBS | DIASTOLIC BLOOD PRESSURE: 66 MMHG | RESPIRATION RATE: 19 BRPM | TEMPERATURE: 97.8 F | HEIGHT: 61 IN | SYSTOLIC BLOOD PRESSURE: 134 MMHG | BODY MASS INDEX: 38.42 KG/M2

## 2025-03-11 DIAGNOSIS — N18.30 TYPE 2 DIABETES MELLITUS WITH STAGE 3 CHRONIC KIDNEY DISEASE, WITH LONG-TERM CURRENT USE OF INSULIN, UNSPECIFIED WHETHER STAGE 3A OR 3B CKD (H): Primary | ICD-10-CM

## 2025-03-11 DIAGNOSIS — E11.22 TYPE 2 DIABETES MELLITUS WITH STAGE 3 CHRONIC KIDNEY DISEASE, WITH LONG-TERM CURRENT USE OF INSULIN, UNSPECIFIED WHETHER STAGE 3A OR 3B CKD (H): Primary | ICD-10-CM

## 2025-03-11 DIAGNOSIS — E11.22 TYPE 2 DIABETES MELLITUS WITH STAGE 3A CHRONIC KIDNEY DISEASE, WITH LONG-TERM CURRENT USE OF INSULIN (H): Primary | ICD-10-CM

## 2025-03-11 DIAGNOSIS — M48.8X5: ICD-10-CM

## 2025-03-11 DIAGNOSIS — N18.31 TYPE 2 DIABETES MELLITUS WITH STAGE 3A CHRONIC KIDNEY DISEASE, WITH LONG-TERM CURRENT USE OF INSULIN (H): Primary | ICD-10-CM

## 2025-03-11 DIAGNOSIS — E11.3413 TYPE 2 DIABETES MELLITUS WITH BOTH EYES AFFECTED BY SEVERE NONPROLIFERATIVE RETINOPATHY AND MACULAR EDEMA, WITH LONG-TERM CURRENT USE OF INSULIN (H): ICD-10-CM

## 2025-03-11 DIAGNOSIS — Z79.4 TYPE 2 DIABETES MELLITUS WITH STAGE 3 CHRONIC KIDNEY DISEASE, WITH LONG-TERM CURRENT USE OF INSULIN, UNSPECIFIED WHETHER STAGE 3A OR 3B CKD (H): Primary | ICD-10-CM

## 2025-03-11 DIAGNOSIS — Z79.4 TYPE 2 DIABETES MELLITUS WITH STAGE 3A CHRONIC KIDNEY DISEASE, WITH LONG-TERM CURRENT USE OF INSULIN (H): Primary | ICD-10-CM

## 2025-03-11 DIAGNOSIS — M81.0 OSTEOPOROSIS WITHOUT CURRENT PATHOLOGICAL FRACTURE, UNSPECIFIED OSTEOPOROSIS TYPE: ICD-10-CM

## 2025-03-11 DIAGNOSIS — Z79.4 TYPE 2 DIABETES MELLITUS WITH BOTH EYES AFFECTED BY SEVERE NONPROLIFERATIVE RETINOPATHY AND MACULAR EDEMA, WITH LONG-TERM CURRENT USE OF INSULIN (H): ICD-10-CM

## 2025-03-11 DIAGNOSIS — M54.2 CERVICALGIA: ICD-10-CM

## 2025-03-11 DIAGNOSIS — I10 HTN, GOAL BELOW 140/90: ICD-10-CM

## 2025-03-11 DIAGNOSIS — L89.320 PRESSURE INJURY OF LEFT BUTTOCK, UNSTAGEABLE (H): ICD-10-CM

## 2025-03-11 DIAGNOSIS — F19.11 HISTORY OF SUBSTANCE ABUSE (H): ICD-10-CM

## 2025-03-11 LAB
BASOPHILS # BLD AUTO: 0 10E3/UL (ref 0–0.2)
BASOPHILS NFR BLD AUTO: 1 %
EOSINOPHIL # BLD AUTO: 0.2 10E3/UL (ref 0–0.7)
EOSINOPHIL NFR BLD AUTO: 4 %
ERYTHROCYTE [DISTWIDTH] IN BLOOD BY AUTOMATED COUNT: 13 % (ref 10–15)
EST. AVERAGE GLUCOSE BLD GHB EST-MCNC: 126 MG/DL
HBA1C MFR BLD: 6 % (ref 0–5.6)
HCT VFR BLD AUTO: 29.2 % (ref 35–47)
HGB BLD-MCNC: 9.2 G/DL (ref 11.7–15.7)
IMM GRANULOCYTES # BLD: 0 10E3/UL
IMM GRANULOCYTES NFR BLD: 0 %
LYMPHOCYTES # BLD AUTO: 2.2 10E3/UL (ref 0.8–5.3)
LYMPHOCYTES NFR BLD AUTO: 36 %
MCH RBC QN AUTO: 30.9 PG (ref 26.5–33)
MCHC RBC AUTO-ENTMCNC: 31.5 G/DL (ref 31.5–36.5)
MCV RBC AUTO: 98 FL (ref 78–100)
MONOCYTES # BLD AUTO: 0.6 10E3/UL (ref 0–1.3)
MONOCYTES NFR BLD AUTO: 11 %
NEUTROPHILS # BLD AUTO: 3 10E3/UL (ref 1.6–8.3)
NEUTROPHILS NFR BLD AUTO: 49 %
NRBC # BLD AUTO: 0 10E3/UL
NRBC BLD AUTO-RTO: 0 /100
PLATELET # BLD AUTO: 232 10E3/UL (ref 150–450)
RBC # BLD AUTO: 2.98 10E6/UL (ref 3.8–5.2)
WBC # BLD AUTO: 6.1 10E3/UL (ref 4–11)

## 2025-03-11 PROCEDURE — 3078F DIAST BP <80 MM HG: CPT | Performed by: FAMILY MEDICINE

## 2025-03-11 PROCEDURE — 80048 BASIC METABOLIC PNL TOTAL CA: CPT | Performed by: FAMILY MEDICINE

## 2025-03-11 PROCEDURE — G2211 COMPLEX E/M VISIT ADD ON: HCPCS | Performed by: FAMILY MEDICINE

## 2025-03-11 PROCEDURE — 85025 COMPLETE CBC W/AUTO DIFF WBC: CPT | Performed by: FAMILY MEDICINE

## 2025-03-11 PROCEDURE — 3075F SYST BP GE 130 - 139MM HG: CPT | Performed by: FAMILY MEDICINE

## 2025-03-11 PROCEDURE — 99607 MTMS BY PHARM ADDL 15 MIN: CPT | Performed by: PHARMACIST

## 2025-03-11 PROCEDURE — 99606 MTMS BY PHARM EST 15 MIN: CPT | Performed by: PHARMACIST

## 2025-03-11 PROCEDURE — 83036 HEMOGLOBIN GLYCOSYLATED A1C: CPT | Performed by: FAMILY MEDICINE

## 2025-03-11 PROCEDURE — 36415 COLL VENOUS BLD VENIPUNCTURE: CPT | Performed by: FAMILY MEDICINE

## 2025-03-11 PROCEDURE — 99214 OFFICE O/P EST MOD 30 MIN: CPT | Performed by: FAMILY MEDICINE

## 2025-03-11 PROCEDURE — 1126F AMNT PAIN NOTED NONE PRSNT: CPT | Performed by: FAMILY MEDICINE

## 2025-03-11 RX ORDER — LIDOCAINE 4 G/G
PATCH TOPICAL
Qty: 30 PATCH | Refills: 11 | Status: SHIPPED | OUTPATIENT
Start: 2025-03-11

## 2025-03-11 ASSESSMENT — PAIN SCALES - GENERAL: PAINLEVEL_OUTOF10: NO PAIN (0)

## 2025-03-11 NOTE — PROGRESS NOTES
"  Assessment & Plan     Type 2 diabetes mellitus with stage 3a chronic kidney disease, with long-term current use of insulin (H)  Laboratory testing will be repeated at this time.  She will have a shorter term follow-up with [facility] pharmacy and myself in about 6 weeks.  - HEMOGLOBIN A1C; Future  - Basic metabolic panel  (Ca, Cl, CO2, Creat, Gluc, K, Na, BUN); Future  - CBC with platelets and differential; Future  - HEMOGLOBIN A1C  - Basic metabolic panel  (Ca, Cl, CO2, Creat, Gluc, K, Na, BUN)  - CBC with platelets and differential    Pressure injury of left buttock, unstageable (H)  This has resolved but will continue to monitor as she is at risk for recurrence.    History of substance abuse (H)  No current substance use at this time other than medications prescribed for her.    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    Other specified spondylopathies, thoracolumbar region (H)  Back pain is significantly improved at this time.  Activity level is good.  Has continued follow-up with rheumatology and currently on leflunomide.    The longitudinal plan of care for the diagnosis(es)/condition(s) as documented were addressed during this visit. Due to the added complexity in care, I will continue to support Nena in the subsequent management and with ongoing continuity of care.      BMI  Estimated body mass index is 38.42 kg/m  as calculated from the following:    Height as of this encounter: 1.55 m (5' 1.02\").    Weight as of this encounter: 92.3 kg (203 lb 8 oz).   Weight management plan: Discussed healthy diet and exercise guidelines      FUTURE APPOINTMENTS:       - Follow-up visit in 6 weeks    Subjective   Nena is a 65 year old, presenting for the following health issues:  Follow Up (After surgery)      3/11/2025     2:04 PM   Additional " "Questions   Roomed by heath   Accompanied by care give     History of Present Illness       Reason for visit:  Follow up    She eats 0-1 servings of fruits and vegetables daily.She consumes 2 sweetened beverage(s) daily.She exercises with enough effort to increase her heart rate 20 to 29 minutes per day.  She exercises with enough effort to increase her heart rate 4 days per week.   She is taking medications regularly.            Patient is here today for follow-up.  She recently had a lumpectomy and sentinel lymph node biopsy for breast cancer.  Unfortunately the lumpectomy wound became infected and required reopening.  It is now healing in by secondary intention.  No significant pain except with dressing changes and no signs of infection.  She is going to start Zometa and anastrozole under the direction of oncology so we discontinued her alendronate today.    Blood sugars have been somewhat elevated recently particularly with holding of her Mounjaro around the surgical procedure.  She is open to doing labs today.    For the last 1 to 2 weeks she has had some nocturnal leg cramps.  Possibly 3 times a week per her report.  These started before initiation of therapy with the anastrozole.    Back pain is stable at this time, not particularly bothersome.  She is accompanied by staff from her facility today.  No other concerns were noted and overall Nena feels she is doing pretty good at this time and continues to enjoy going to her day program.      Review of Systems  Constitutional, HEENT, cardiovascular, pulmonary, gi and gu systems are negative, except as otherwise noted.      Objective    BP (!) 150/81   Pulse 68   Temp 97.8  F (36.6  C) (Temporal)   Resp 19   Ht 1.55 m (5' 1.02\")   Wt 92.3 kg (203 lb 8 oz)   LMP 01/06/2015 (Exact Date)   SpO2 95%   BMI 38.42 kg/m    Body mass index is 38.42 kg/m .  Physical Exam   GENERAL: alert and no distress  EYES: Eyes grossly normal to inspection, PERRL and " conjunctivae and sclerae normal  NECK: no adenopathy, no asymmetry, masses, or scars  RESP: lungs clear to auscultation - no rales, rhonchi or wheezes  BREAST: Right breast only was examined.  Dressing was removed to reveal a oval-shaped opening in the skin with a packed wound.  The wound packing was not removed but there was no erythema, abnormal odor, or significant drainage  CV: regular rate and rhythm, normal S1 S2, no S3 or S4, no murmur, click or rub, no peripheral edema   MS: no gross musculoskeletal defects noted, no edema  SKIN: no suspicious lesions or rashes  NEURO: Normal strength and tone, mentation intact and speech normal  PSYCH: mentation appears normal, affect normal/bright            Signed Electronically by: Albino Rainey MD

## 2025-03-11 NOTE — PROGRESS NOTES
Medication Therapy Management (MTM) Encounter    ASSESSMENT:                            Medication Adherence/Access: No issues identified.    Diabetes  Self monitoring of blood glucose is at goal of > 50% time in target with continuous glucose monitoring but glucose are higher than previous, like due to holding Mounjaro and diet.  Patient would benefit from restart Mounjaro and lifestyle changes. OK to restart at current dose, has missed <3 doses.    Hypertension:   Stable    Osteoporosis  Will be switching to Zometa per oncology, scheduled for July. Will discontinue alendronate now to avoid potential for duplicate therapy. OK to be off bisphosphonate until Zometa is started.    PLAN:                            Stop alendronate. Start Zometa in July per oncology.     Restart Mounjaro 7.5mg every 7 days as prescribed    Follow-up: 6 weeks    SUBJECTIVE/OBJECTIVE:                          Nena Tang is a 65 year old female seen for a follow-up visit. Today's visit is a co-visit with Albino Rainey MD. Patient was accompanied by group home staff.      Reason for visit: medication recheck.    Allergies/ADRs: Reviewed in chart  Past Medical History: Reviewed in chart  Tobacco: She reports that she has never smoked. She has never used smokeless tobacco.  Alcohol: not currently using    Medication Adherence/Access: no issues reported.  Patient has assistance with medication administration: group home.    Diabetes   Lantus 22 units once daily  Humalog sliding scale  Mounjaro 7.5mg weekly - has been out x2 weeks due to PA approval, just getting from the pharmacy to restart tomorrow.  Side effects: none    Current diabetes symptoms: none  Diet/Exercise: eating more sweets lately       Blood sugar monitoring: Continuous Glucose Monitor see AGP below      Eye exam is up to date  Foot exam: due    Hypertension   Amlodipine 10mg daily  Metoprolol XL 50mg AM and 100mg PM  Losartan 100mg daily  Patient reports no current  "medication side effects.   Patient has blood pressure monitored by group home.  Home BP monitoring :  130s/80s         Bone Health   Osteoporosis:   calcium 600mg daily  Vitamin D 2000 units daily (recently started with low vitamin D levels)  alendronate (Fosamax) 70mg weekly (has been on current therapy 2.5 years (started 7/2022)  Recommended by oncology to start Zometa.   Patient is not experiencing side effects.  DEXA History: last 2/10/25     DXA RESULTS  -Lumbar Spine: L1-L4 (L3): BMD: 0.958 g/cm2. T-score: -2.1. Z-score: -1.3.  -RIGHT Hip Total: BMD: 0.882 g/cm2. T-score: -1.0. Z-score: -0.8.  -RIGHT Hip Femoral neck: BMD: 0.682 g/cm2. T-score: -2.6. Z-score: -2.1.  -LEFT Hip Total: BMD: 0.973 g/cm2. T-score: -0.3. Z-score: -0.1.  -LEFT Hip Femoral neck: BMD: 0.833 g/cm2. T-score: -1.5. Z-score: -1.0.            Today's Vitals: LMP 01/06/2015 (Exact Date)   BP Readings from Last 1 Encounters:   03/11/25 134/66     Pulse Readings from Last 1 Encounters:   03/11/25 68     Wt Readings from Last 1 Encounters:   03/11/25 203 lb 8 oz (92.3 kg)     Ht Readings from Last 1 Encounters:   03/11/25 5' 1.02\" (1.55 m)     Estimated body mass index is 38.42 kg/m  as calculated from the following:    Height as of an earlier encounter on 3/11/25: 5' 1.02\" (1.55 m).    Weight as of an earlier encounter on 3/11/25: 203 lb 8 oz (92.3 kg).    Temp Readings from Last 1 Encounters:   03/11/25 97.8  F (36.6  C) (Temporal)      ----------------      I spent 20 minutes with this patient today. All changes were made via collaborative practice agreement with Albino Rainey MD.     A summary of these recommendations was given to the patient (see AVS from today's appointment with PCP).    Eugenia De Jesus, PharmD, [facility]   Medication Therapy Management Pharmacist   [facility]  [phone]          Medication Therapy Recommendations  Osteoporosis without current pathological fracture, " unspecified osteoporosis type   1 Current Medication: alendronate (FOSAMAX) 70 MG tablet (Discontinued)   Current Medication Sig: TAKE 1 TABLET BY MOUTH EVERY 7 DAYS   Rationale: Duplicate Therapy - Unnecessary medication therapy - Indication   Recommendation: Discontinue Medication   Status: Accepted per CPA   Identified Date: 3/11/2025 Completed Date: 3/11/2025

## 2025-03-12 ENCOUNTER — TRANSFERRED RECORDS (OUTPATIENT)
Dept: HEALTH INFORMATION MANAGEMENT | Facility: CLINIC | Age: 65
End: 2025-03-12

## 2025-03-12 ENCOUNTER — OFFICE VISIT (OUTPATIENT)
Dept: SURGERY | Facility: CLINIC | Age: 65
End: 2025-03-12
Payer: COMMERCIAL

## 2025-03-12 DIAGNOSIS — S21.001S BREAST WOUND, RIGHT, SEQUELA: Primary | ICD-10-CM

## 2025-03-12 LAB
ANION GAP SERPL CALCULATED.3IONS-SCNC: 9 MMOL/L (ref 7–15)
BUN SERPL-MCNC: 17.3 MG/DL (ref 8–23)
CALCIUM SERPL-MCNC: 9.4 MG/DL (ref 8.8–10.4)
CHLORIDE SERPL-SCNC: 108 MMOL/L (ref 98–107)
CREAT SERPL-MCNC: 1.04 MG/DL (ref 0.51–0.95)
EGFRCR SERPLBLD CKD-EPI 2021: 60 ML/MIN/1.73M2
GLUCOSE SERPL-MCNC: 171 MG/DL (ref 70–99)
HCO3 SERPL-SCNC: 24 MMOL/L (ref 22–29)
POTASSIUM SERPL-SCNC: 4.9 MMOL/L (ref 3.4–5.3)
SODIUM SERPL-SCNC: 141 MMOL/L (ref 135–145)

## 2025-03-12 NOTE — ADDENDUM NOTE
Addended by: MERNA ARMENDARIZ on: 3/12/2025 02:28 PM     Modules accepted: Orders    
Head atraumatic, normal cephalic shape.

## 2025-03-12 NOTE — PROGRESS NOTES
"Dressing change performed without incident.  No complaints of pain.     Dressing and packing removed. Some discharge on dressing. No foul odor.     Bleeding present when removing packing.     Irrigated with saline.      Packed with 2\" gauze roll, covered with ABD dressings and secured with tape. Advised group home staff to start changing outer ABD once daily for drainage    Still working on home care    Follow up as scheduled on Friday    Patient had radiation visit prior to appointment today. Radiation team wanting Dr wilkes's input as to when radiation can begin    Will discuss this with Dr Wilkes tomorrow and inform them on Friday when they are here for appointment    Lexi Weldon RN-BSN    "

## 2025-03-18 ENCOUNTER — OFFICE VISIT (OUTPATIENT)
Dept: SURGERY | Facility: CLINIC | Age: 65
End: 2025-03-18
Payer: COMMERCIAL

## 2025-03-18 DIAGNOSIS — I25.119 CORONARY ARTERY DISEASE INVOLVING NATIVE HEART WITH ANGINA PECTORIS, UNSPECIFIED VESSEL OR LESION TYPE: ICD-10-CM

## 2025-03-18 DIAGNOSIS — S21.001S BREAST WOUND, RIGHT, SEQUELA: Primary | ICD-10-CM

## 2025-03-18 DIAGNOSIS — I10 HTN, GOAL BELOW 140/90: ICD-10-CM

## 2025-03-18 DIAGNOSIS — R19.5 LOOSE STOOLS: ICD-10-CM

## 2025-03-18 DIAGNOSIS — K21.9 GASTROESOPHAGEAL REFLUX DISEASE WITHOUT ESOPHAGITIS: ICD-10-CM

## 2025-03-18 DIAGNOSIS — R30.0 DYSURIA: ICD-10-CM

## 2025-03-18 PROCEDURE — 99024 POSTOP FOLLOW-UP VISIT: CPT | Performed by: SURGERY

## 2025-03-18 RX ORDER — ASPIRIN 81 MG/1
81 TABLET, COATED ORAL DAILY
Qty: 28 TABLET | Refills: 11 | OUTPATIENT
Start: 2025-03-18

## 2025-03-18 RX ORDER — ASCORBIC ACID 500 MG
TABLET ORAL
Qty: 112 TABLET | Refills: 8 | Status: SHIPPED | OUTPATIENT
Start: 2025-03-18

## 2025-03-18 RX ORDER — METOPROLOL SUCCINATE 50 MG/1
TABLET, EXTENDED RELEASE ORAL
Qty: 84 TABLET | Refills: 11 | OUTPATIENT
Start: 2025-03-18

## 2025-03-18 RX ORDER — PANTOPRAZOLE SODIUM 40 MG/1
40 TABLET, DELAYED RELEASE ORAL DAILY
Qty: 28 TABLET | Refills: 11 | OUTPATIENT
Start: 2025-03-18

## 2025-03-18 NOTE — PROGRESS NOTES
Surgery follow-up  I am seeing Ania in follow-up for her right breast wound.  This happened following wound infection related to previous lumpectomy.  Unfortunately, the gradually healing wound will delay her radiation.  The wound is very clean and does appear to be healing but slowly.  Measures approximately 3 x 3 cm at the skin, 3 to 4 cm in depth.  Today I cut a piece of silver cell and put this within the wound.  I then used 4 x 4's to pack the wound and cover it.  She should come back in 2 days for wound change.  Steve Giron MD  General Surgery, Office [phone]

## 2025-03-24 ENCOUNTER — MYC REFILL (OUTPATIENT)
Dept: FAMILY MEDICINE | Facility: CLINIC | Age: 65
End: 2025-03-24
Payer: COMMERCIAL

## 2025-03-24 ENCOUNTER — MYC REFILL (OUTPATIENT)
Dept: RHEUMATOLOGY | Facility: CLINIC | Age: 65
End: 2025-03-24
Payer: COMMERCIAL

## 2025-03-24 DIAGNOSIS — M25.50 POLYARTHRALGIA: ICD-10-CM

## 2025-03-24 DIAGNOSIS — K21.9 GASTROESOPHAGEAL REFLUX DISEASE WITHOUT ESOPHAGITIS: ICD-10-CM

## 2025-03-24 DIAGNOSIS — I10 HTN, GOAL BELOW 140/90: ICD-10-CM

## 2025-03-24 DIAGNOSIS — I25.119 CORONARY ARTERY DISEASE INVOLVING NATIVE HEART WITH ANGINA PECTORIS, UNSPECIFIED VESSEL OR LESION TYPE: ICD-10-CM

## 2025-03-24 RX ORDER — PANTOPRAZOLE SODIUM 40 MG/1
40 TABLET, DELAYED RELEASE ORAL DAILY
Qty: 28 TABLET | Refills: 11 | Status: SHIPPED | OUTPATIENT
Start: 2025-03-24

## 2025-03-24 RX ORDER — METOPROLOL SUCCINATE 50 MG/1
TABLET, EXTENDED RELEASE ORAL
Qty: 84 TABLET | Refills: 11 | Status: SHIPPED | OUTPATIENT
Start: 2025-03-24

## 2025-03-24 RX ORDER — ASPIRIN 81 MG/1
81 TABLET ORAL DAILY
Qty: 28 TABLET | Refills: 11 | Status: SHIPPED | OUTPATIENT
Start: 2025-03-24

## 2025-03-24 RX ORDER — LEFLUNOMIDE 20 MG/1
TABLET ORAL
Qty: 30 TABLET | Refills: 1 | Status: CANCELLED | OUTPATIENT
Start: 2025-03-24

## 2025-03-25 DIAGNOSIS — R19.5 LOOSE STOOLS: ICD-10-CM

## 2025-03-25 RX ORDER — CALCIUM POLYCARBOPHIL 625 MG/1
TABLET, FILM COATED ORAL
Qty: 16 TABLET | Refills: 2 | Status: SHIPPED | OUTPATIENT
Start: 2025-03-25

## 2025-03-25 RX ORDER — CALCIUM POLYCARBOPHIL 625 MG/1
TABLET, FILM COATED ORAL
Qty: 30 TABLET | OUTPATIENT
Start: 2025-03-25

## 2025-03-25 NOTE — TELEPHONE ENCOUNTER
Fiber-Lax 625MG TABS   Last Written Prescription:  10/31/24   #30, 3 refills  ----------------------  Last Visit Date: 10/29/24  Future Visit Date: 6/17/25    Refill decision: Medication unable to be refilled by RN due to: Medication not included in refill protocol policy     Request from pharmacy:  Requested Prescriptions   Pending Prescriptions Disp Refills    FIBER- MG tablet [Pharmacy Med Name: Fiber-Lax 625MG TABS] 16 tablet      Sig: TAKE 2 TABLETS BY MOUTH TWICE A WEEK . TAKE WEDNESDAY WITH OZEMPIC AND THURSDAY DAY AFTER OZEMPIC. DISCONTINUE IF DIARRHEA       There is no refill protocol information for this order

## 2025-03-27 NOTE — TELEPHONE ENCOUNTER
Rx available  leflunomide (ARAVA) 20 MG tablet 30 tablet 1 3/4/2025 -- No   Sig: TAKE 1 TABLET BY MOUTH DAILY *LABS EVERY 8-12 WEEKS     ----    Request from pharmacy:  Requested Prescriptions   Pending Prescriptions Disp Refills    leflunomide (ARAVA) 20 MG tablet 30 tablet 1     Sig: TAKE 1 TABLET BY MOUTH DAILY *LABS EVERY 8-12 WEEKS       There is no refill protocol information for this order

## 2025-03-31 ENCOUNTER — TELEPHONE (OUTPATIENT)
Dept: RHEUMATOLOGY | Facility: CLINIC | Age: 65
End: 2025-03-31

## 2025-03-31 ENCOUNTER — OFFICE VISIT (OUTPATIENT)
Dept: RHEUMATOLOGY | Facility: CLINIC | Age: 65
End: 2025-03-31
Attending: INTERNAL MEDICINE
Payer: COMMERCIAL

## 2025-03-31 VITALS
WEIGHT: 202 LBS | BODY MASS INDEX: 38.14 KG/M2 | OXYGEN SATURATION: 95 % | DIASTOLIC BLOOD PRESSURE: 78 MMHG | HEART RATE: 68 BPM | SYSTOLIC BLOOD PRESSURE: 116 MMHG

## 2025-03-31 DIAGNOSIS — C50.911 MALIGNANT NEOPLASM OF RIGHT FEMALE BREAST, UNSPECIFIED ESTROGEN RECEPTOR STATUS, UNSPECIFIED SITE OF BREAST (H): ICD-10-CM

## 2025-03-31 DIAGNOSIS — R76.8 RHEUMATOID FACTOR POSITIVE: ICD-10-CM

## 2025-03-31 DIAGNOSIS — R76.8 POSITIVE ANA (ANTINUCLEAR ANTIBODY): ICD-10-CM

## 2025-03-31 DIAGNOSIS — M19.90 INFLAMMATORY ARTHRITIS: Primary | ICD-10-CM

## 2025-03-31 DIAGNOSIS — M15.9 GENERALIZED OA: ICD-10-CM

## 2025-03-31 DIAGNOSIS — M25.50 POLYARTHRALGIA: ICD-10-CM

## 2025-03-31 DIAGNOSIS — D64.9 LOW HEMOGLOBIN: ICD-10-CM

## 2025-03-31 PROCEDURE — 99214 OFFICE O/P EST MOD 30 MIN: CPT | Mod: 24 | Performed by: INTERNAL MEDICINE

## 2025-03-31 PROCEDURE — G2211 COMPLEX E/M VISIT ADD ON: HCPCS | Performed by: INTERNAL MEDICINE

## 2025-03-31 PROCEDURE — G0463 HOSPITAL OUTPT CLINIC VISIT: HCPCS | Performed by: INTERNAL MEDICINE

## 2025-03-31 PROCEDURE — 3074F SYST BP LT 130 MM HG: CPT | Performed by: INTERNAL MEDICINE

## 2025-03-31 PROCEDURE — 3078F DIAST BP <80 MM HG: CPT | Performed by: INTERNAL MEDICINE

## 2025-03-31 PROCEDURE — 1126F AMNT PAIN NOTED NONE PRSNT: CPT | Performed by: INTERNAL MEDICINE

## 2025-03-31 RX ORDER — LEFLUNOMIDE 20 MG/1
TABLET ORAL
Qty: 30 TABLET | Refills: 1 | Status: SHIPPED | OUTPATIENT
Start: 2025-03-31

## 2025-03-31 ASSESSMENT — PAIN SCALES - GENERAL: PAINLEVEL_OUTOF10: NO PAIN (0)

## 2025-03-31 NOTE — LETTER
3/31/2025       RE: Nena Tang  [address] [facility] [id number]     Dear Colleague,    Thank you for referring your patient, Nena Tang, to the [facility] RHEUMATOLOGY at [facility]. Please see a copy of my visit note below.                           Chief Complaint/Reason for Visit: pos AIDA.     HPI:    Nena Tang is a 63 year old female with past medical history listed below. She denied having joint pain or swelling. She continues to be on leflunomide 20 mg daily. She continues to have leg cramps.     REVIEW OF SYSTEMS  Cardiovascular - neg for chest pain or sob.   Respiratory - neg for cough or sob   Skin - neg for skin rashes       Past Medical History:   Diagnosis Date     Acute respiratory failure with hypoxia (H) 09/04/2017     CAD (coronary artery disease)     5/2014 cath, nonbostructive stenosis to LAD, RCA.     Chronic low back pain 01/22/2013     Cocaine abuse, in remission (H)      Fecal urgency 03/08/2012     History of heroin abuse (H)      Hyperlipidemia LDL goal <100 10/31/2010     Hypertension 07/29/2013     Illiterate 08/30/2011     Irritable bowel syndrome      Left cataract      Migraine 04/19/2012     Migraine headache 04/22/2013     Moderate major depression (H) 06/08/2011     Noncompliance with medication regimen 06/08/2011     Obesity      CINDY (obstructive sleep apnea) 03/08/2012    does not use CPAP     CINDY (obstructive sleep apnea)- mild AHI 10.3      Osteopenia 10/07/2009     Pneumonia      Pneumonia of right lower lobe due to infectious organism 09/04/2017     Schizoaffective disorder, depressive type (H) 02/25/2013     Sepsis (H) 08/29/2017     Suicidal intent 10/02/2013     Takotsubo cardiomyopathy      Type 2 diabetes mellitus (H) 08/30/2011     Uterine cancer (H) 1983     Verbal auditory hallucination 10/04/2012     Past Surgical History:   Procedure Laterality Date     BIOPSY NODE SENTINEL Right  1/27/2025    Procedure: radiofrequency localized right axillary lymph node excision, right axillary sentinel lymph node biopsy;  Surgeon: Albino Giron MD;  Location:  OR     CATARACT IOL, RT/LT Bilateral 2017     CHOLECYSTECTOMY       COLONOSCOPY N/A 03/16/2017    Procedure: COLONOSCOPY;  Surgeon: Traci Gonzalez MD;  Location:  GI     Coronary CTA  05/21/2014     HYSTERECTOMY  1983    uterine cancer yearly pap's per provider.     LAPAROSCOPIC CHOLECYSTECTOMY  2008     LUMPECTOMY, BREAST, LOCALIZED USING RADIOFREQUENCY IDENTIFICATION Right 1/27/2025    Procedure: Radiofrequency localized right partial mastectomy;  Surgeon: Albino Giron MD;  Location:  OR     PHACOEMULSIFICATION CLEAR CORNEA WITH STANDARD INTRAOCULAR LENS IMPLANT Left 05/05/2017    Procedure: PHACOEMULSIFICATION CLEAR CORNEA WITH STANDARD INTRAOCULAR LENS IMPLANT;  LEFT EYE PHACOEMULSIFICATION CLEAR CORNEA WITH STANDARD INTRAOCULAR LENS IMPLANT ;  Surgeon: Tyra Diaz MD;  Location:  EC     PHACOEMULSIFICATION CLEAR CORNEA WITH STANDARD INTRAOCULAR LENS IMPLANT Right 06/30/2017    Procedure: PHACOEMULSIFICATION CLEAR CORNEA WITH STANDARD INTRAOCULAR LENS IMPLANT;  RIGHT EYE PHACOEMULSIFICATION CLEAR CORNEA WITH STANDARD INTRAOCULAR LENS IMPLANT;  Surgeon: Tyra Diaz MD;  Location:  EC     RELEASE TRIGGER FINGER  10/11/2012    Left thumb. Procedure: RELEASE TRIGGER FINGER;  LEFT THUMB TRIGGER RELEASE;  Surgeon: aTy Langley MD;  Location:  SD     RELEASE TRIGGER FINGER Right 12/26/2016    Procedure: RELEASE TRIGGER FINGER;  Surgeon: Albino Castañeda MD;  Location:  OR     ZZC OOPHORECTORMY FOR MALIG, W/BX  1983    UTERINE     Family History   Problem Relation Age of Onset     Cancer Mother         BLADDER     Respiratory Mother         COPD     Gastrointestinal Disease Mother         CIRRHOSIS OF LI BOLIVAR     Alcohol/Drug Mother      Diabetes Mother      Hypertension Mother      Lipids  Mother      C.A.D. Mother      Glaucoma Mother      Alcohol/Drug Sister      Mental Illness Sister      Alcohol/Drug Sister      Psychotic Disorder Sister      Cancer Maternal Grandmother         UNKNOWN TYPE     Cancer Brother         COLON     Cancer - colorectal Brother         IN HIS LATE 30S     Alcohol/Drug Brother          OF HEROIN OVERDOSE AT AGE 22 YRS     Macular Degeneration No family hx of      Social History     Socioeconomic History     Marital status:      Spouse name: None     Number of children: 2     Years of education: None     Highest education level: None   Tobacco Use     Smoking status: Never     Smokeless tobacco: Never   Vaping Use     Vaping Use: Never used   Substance and Sexual Activity     Alcohol use: Not Currently     Comment: when younger     Drug use: No     Comment: history of     Sexual activity: Not Currently     Partners: Male     Birth control/protection: None, Condom   Other Topics Concern      Service No     Blood Transfusions No     Caffeine Concern No     Occupational Exposure No     Hobby Hazards No     Sleep Concern Yes     Stress Concern Yes     Weight Concern Yes     Special Diet Yes     Comment: DM     Back Care Yes     Exercise Yes     Comment: WALKS DAILY     Seat Belt Yes     Self-Exams No     Comment: ENCOURAGED   Social History Narrative     10/2014. Has 2 sons. 7 grandchildren.         Unemployed. Graduated HS.         Tobacco use: Denies    Alcohol use: Escalated use since   10/2014    Drug: Denies     Social Determinants of Health     Financial Resource Strain: Low Risk  (10/3/2023)    Financial Resource Strain      Within the past 12 months, have you or your family members you live with been unable to get utilities (heat, electricity) when it was really needed?: No   Food Insecurity: Low Risk  (10/3/2023)    Food Insecurity      Within the past 12 months, did you worry that your food would run out before you got money to  buy more?: No      Within the past 12 months, did the food you bought just not last and you didn t have money to get more?: No   Transportation Needs: Low Risk  (10/3/2023)    Transportation Needs      Within the past 12 months, has lack of transportation kept you from medical appointments, getting your medicines, non-medical meetings or appointments, work, or from getting things that you need?: No   Interpersonal Safety: Not At Risk (8/25/2022)    Humiliation, Afraid, Rape, and Kick questionnaire      Fear of Current or Ex-Partner: No      Emotionally Abused: No      Physically Abused: No      Sexually Abused: No   Housing Stability: Low Risk  (10/3/2023)    Housing Stability      Do you have housing? : Yes      Are you worried about losing your housing?: No       Allergies   Allergen Reactions     Imidazole Antifungals Hives     Tolerates diflucan     Ketoprofen Itching     Pruritis to topical     Lisinopril Hives     Metformin Other (See Comments)     Patient hospitalized for lactic acidosis - admitting provider suspectd caused by metformin     Metronidazole Hives     Penicillins      Posaconazole Hives     Tolerates diflucan       Current Outpatient Medications   Medication Sig Dispense Refill     acetaminophen (TYLENOL) 500 MG tablet Take 2 tablets (1,000 mg) by mouth 3 times daily. 168 tablet 4     albuterol (PROAIR HFA/PROVENTIL HFA/VENTOLIN HFA) 108 (90 Base) MCG/ACT inhaler Inhale 2 puffs into the lungs every 6 hours as needed for shortness of breath, wheezing or cough 18 g 3     Alcohol Swabs (EASY TOUCH ALCOHOL PREP MEDIUM) 70 % PADS APPLY 1 UNITS TOPICALLY 8 TIMES DAILY 200 each 3     amantadine (SYMMETREL) 100 MG capsule TAKE 1 CAPSULE BY MOUTH DAILY 28 capsule 11     amLODIPine (NORVASC) 10 MG tablet Take 1 tablet (10 mg) by mouth every morning. 30 tablet 11     anastrozole (ARIMIDEX) 1 MG tablet Take 1 tablet (1 mg) by mouth daily. 90 tablet 3     aspirin (ASPIRIN LOW DOSE) 81 MG EC tablet Take 1  tablet (81 mg) by mouth daily. 28 tablet 11     atorvastatin (LIPITOR) 80 MG tablet TAKE 1 TABLET BY MOUTH DAILY 28 tablet 5     blood glucose (ONETOUCH ULTRA) test strip USE TO TEST BLOOD SUGAR 10 TIMES DAILY OR AS DIRECTED. 300 strip 11     buPROPion (WELLBUTRIN XL) 150 MG 24 hr tablet Take 1 tablet (150 mg) by mouth every morning. 30 tablet 11     calcium carbonate (OS-ALLEN) 1500 (600 Ca) MG tablet TAKE 1 TABLET BY MOUTH DAILY 28 tablet 5     chlorhexidine (PERIDEX) 0.12 % solution Swish and spit 10 mLs in mouth 2 times daily 1893 mL 3     Cholecalciferol (D3 HIGH POTENCY) 25 MCG (1000 UT) CAPS TAKE 2 CAPSULES BY MOUTH DAILY 180 capsule 2     clonazePAM (KLONOPIN) 0.5 MG tablet TAKE 1 TABLET BY MOUTH AT BEDTIME 28 tablet 5     Continuous Glucose Sensor (DEXCOM G6 SENSOR) MISC Change every 10 days. 9 each 4     Continuous Glucose Transmitter (DEXCOM G6 TRANSMITTER) MISC Change every 3 months. 1 each 4     diclofenac (VOLTAREN) 1 % topical gel APPLY 4 GRAMS TO PAINFUL AREA AT BEDTIME . MAY USE AN ADDITIONAL 3 DOSES DURING 100 g 1     FIBER- MG tablet TAKE 2 TABLETS BY MOUTH TWICE A WEEK . TAKE WEDNESDAY WITH OZEMPIC AND THURSDAY DAY AFTER OZEMPIC. DISCONTINUE IF DIARRHEA 16 tablet 2     gabapentin (NEURONTIN) 300 MG capsule TAKE 1 CAPSULE BY MOUTH AT BEDTIME 28 capsule 11     HUMALOG KWIKPEN 100 UNIT/ML soln Inject 0-12 Units subcutaneously 4 times daily (with meals and nightly) Before meals: BG 81 - 150:  0 units 151 - 200: 2 units, 201 - 250: 4 units, 251-300: 6 units, 301 - 350: 8 units, 351 or greater : 10 units At BEDTIME-If >4 hours since last Humalog injection For  - 249 give 1 units, 250 - 299 give 2 units,300 - 349 give 3 units, = or > 350 give 4 units. 15 mL 1     HYDROcodone-acetaminophen (NORCO) 5-325 MG tablet Take 1 tablet by mouth every 6 hours as needed for severe pain. 10 tablet 0     hydrOXYzine (VISTARIL) 25 MG capsule TAKE 1 CAPSULE BY MOUTH EVERY NIGHT AT BEDTIME ANXIETY/SLEEP 28  capsule 0     ibuprofen (ADVIL/MOTRIN) 200 MG tablet Take 200 mg by mouth every 4 hours as needed for pain       insulin glargine (LANTUS SOLOSTAR) 100 UNIT/ML pen Inject 22 Units subcutaneously daily. 45 mL 1     insulin pen needle (BD AUTOSHIELD DUO) 30G X 5 MM USE 6 DAILY OR AS DIRECTED 200 each 10     Lancets (ONETOUCH DELICA PLUS [id number]) MISC 1 EACH AS NEEDED  USE TO TEST BLOOD SUGARS 1-2 TIMES DAILY AS DIRECTED 100 each 3     leflunomide (ARAVA) 20 MG tablet TAKE 1 TABLET BY MOUTH DAILY *LABS EVERY 8-12 WEEKS 30 tablet 1     Lidocaine (LIDOCAINE PAIN RELIEF) 4 % Patch APPLY 1 PATCH ONTO SKIN EVERY 24 HOURS as needed; APPLY AT NIGHT AND REMOVE IN THE MORNING ( TWELVE HOURS PATCH FREE). 30 patch 11     loperamide (IMODIUM) 2 MG capsule TAKE 1 CAPSULE BY MOUTH FOUR TIMES A DAY AS NEEDED FOR DIARRHEA 30 capsule 1     losartan (COZAAR) 100 MG tablet TAKE 1 TABLET BY MOUTH DAILY 28 tablet 11     Magnesium Oxide 250 MG TABS Take 1 tablet (250 mg) by mouth at bedtime. 28 tablet 11     melatonin 5 MG tablet Take 5 mg by mouth at bedtime       metoprolol succinate ER (TOPROL XL) 50 MG 24 hr tablet TAKE 1 TABLET BY MOUTH IN THE MORNING AND TAKE 2 TABLETS AT BEDTIME 84 tablet 11     mirabegron (MYRBETRIQ) 50 MG 24 hr tablet Take 1 tablet (50 mg) by mouth daily. 90 tablet 5     naproxen (NAPROSYN) 375 MG tablet Take 1 tablet (375 mg) by mouth 2 times daily as needed for moderate pain (denatl pain) 60 tablet 11     NYAMYC 995903 UNIT/GM external powder APPLY TOPICALLY TWICE A DAY AS NEEDED 60 g 0     ondansetron (ZOFRAN) 4 MG tablet TAKE 1 TABLET BY MOUTH EVERY 8 HOURS AS NEEDED FOR NAUSEA 10 tablet 1     order for DME Equipment being ordered: Depends. 30 each 4     order for DME Equipment being ordered: CPAP Supplies. 1 each 0     oxyCODONE (ROXICODONE) 5 MG tablet Take 1 tablet (5 mg) by mouth every 6 hours as needed for moderate to severe pain. 8 tablet 0     paliperidone ER (INVEGA) 6 MG 24 hr tablet TAKE 2 TABLETS  BY MOUTH EVERY NIGHT AT BEDTIME 60 tablet 11     pantoprazole (PROTONIX) 40 MG EC tablet Take 1 tablet (40 mg) by mouth daily. 28 tablet 11     prazosin (MINIPRESS) 1 MG capsule Take 1 mg by mouth at bedtime       QUEtiapine (SEROQUEL) 100 MG tablet Take 100 mg by mouth at bedtime       senna-docusate (SENOKOT S) 8.6-50 MG tablet Take 1 tablet by mouth daily as needed for constipation. 30 tablet 11     senna-docusate (SENOKOT-S/PERICOLACE) 8.6-50 MG tablet Take 1 tablet by mouth 2 times daily as needed for constipation (While on oral opioids.). 10 tablet 0     sertraline (ZOLOFT) 100 MG tablet Take 100 mg by mouth daily       Tirzepatide 7.5 MG/0.5ML SOAJ Inject 0.5 mLs (7.5 mg) subcutaneously every 7 days. 2 mL 11     traZODone (DESYREL) 100 MG tablet TAKE 1 TABLET BY MOUTH AT BEDTIME 28 tablet 11     vitamin C (ASCORBIC ACID) 500 MG tablet TAKE 2 TABLETS BY MOUTH IN THE MORNING AND TAKE 2 TABLETS BY MOUTH IN THE EVENING 112 tablet 8     zinc oxide (DESITIN) 20 % external ointment Apply topically 3 times daily as needed for irritation (barrier cream) 60 g 3     Current Facility-Administered Medications   Medication Dose Route Frequency Provider Last Rate Last Admin     apraclonidine (IOPIDINE) 1 % ophthalmic solution 1 drop  1 drop Both Eyes Once Tiburcio Chacon MD           PHYSICAL EXAM    /78 (BP Location: Right arm, Patient Position: Sitting, Cuff Size: Adult Large)   Pulse 68   Wt 91.6 kg (202 lb)   LMP 01/06/2015 (Exact Date)   SpO2 95%   BMI 38.14 kg/m      General: Alert, No apparent distress  Psych: Affect euthymic  Eyes: Sclera noninjected  Skin: No rashes noted  Cardiovascular: Regular rate and rhythm, Normal S1 and S2 and No murmurs, rubs or gallops  Respiratory: Clear to auscultation with no wheezing or crackles  Musculoskeletal: Hands:  No synovitis.    GI - abdomen non tender   Wrists:  Normal.  Elbows:  Normal.  Shoulders:  Normal.    LABS   Reviewed as below.     March  2025  CBC - Normal wbc, low Hb at 9.2 and plt normal   Creatinine elevated at 1.04    April 2024  Hep B surface ag neg     July and Sept 2023  CK, folate, vitamin B12, TSH, Vit D, LFT  normal   C4 - 42 and C3 161 (elevated)  Anti sm, mitochondrial,RNP, SCL 70, ds dna neg  Centromere pos   AIDA pos 1:320 centromere   RF 13  CCP neg     June 2023  ESR, CRP normal      Latest Reference Range & Units 07/13/16 13:50 09/06/18 12:01 04/02/24 12:03   Hep B Surface Agn Nonreactive    Nonreactive   Hepatitis C Antibody NR  Nonreactive   Assay performance characteristics have not been established for newborns,   infants, and children       HIV Antigen Antibody Combo NR^Nonreactive      Nonreactive          EXAM: CT CHEST PE ABDOMEN PELVIS W CONTRAST  LOCATION: [facility]  DATE: 8/17/2023     INDICATION: right sided flank pain, pain with breatihng. evaluate for PE, kidney stone  COMPARISON: CT chest 6/27/23. CT abdomen and pelvis 6/25/23.  TECHNIQUE: CT chest pulmonary angiogram and routine CT abdomen pelvis with IV contrast. Arterial phase through the chest and venous phase through the abdomen and pelvis. Multiplanar reformats and MIP reconstructions were performed. Dose reduction   techniques were used.   CONTRAST: 122 mL Isovue   370     FINDINGS:  ANGIOGRAM CHEST: No pulmonary embolus. No aortic dissection or aneurysm.     LUNGS AND PLEURA: Normal.     MEDIASTINUM/AXILLAE: Normal.     CORONARY ARTERY CALCIFICATION: Moderate.      HEPATOBILIARY: Cholecystectomy.     PANCREAS: Normal.     SPLEEN: Normal.     ADRENAL GLANDS: Normal.     KIDNEYS/BLADDER: Normal.     BOWEL: Normal. No obstruction or inflammation. Normal appendix.     LYMPH NODES: Normal.     VASCULATURE: Mild to moderate aortoiliac atherosclerosis.     PELVIC ORGANS: Hysterectomy.     MUSCULOSKELETAL: Mild degenerative changes of the spine.                                                                      IMPRESSION:  No acute process  in the chest, abdomen or pelvis.      KNEE BILATERAL THREE VIEWS  6/22/2023 2:01 PM      HISTORY: Bilateral knee pain.     COMPARISON: None.                                                                       IMPRESSION:  Mild patellofemoral compartment degenerative change on  both sides. There is no evidence of an acute fracture. Joint effusion  is not assessed due to obliquity of the lateral views. Atherosclerotic  vascular calcifications.     TTE  Interpretation Summary     Left ventricular systolic function is normal.  The visual ejection fraction is estimated at 60-65%.  Right ventricular systolic pressure is elevated, consistent with mild  pulmonary hypertension. This is new compared to 2014.  The study was technically difficult.       ASSESSMENT      1. Inflammatory arthritis    2. Rheumatoid factor positive    3. Positive AIDA (antinuclear antibody)    4. Low hemoglobin    5. Generalized OA    6. Polyarthralgia    7. Malignant neoplasm of right female breast, unspecified estrogen receptor status, unspecified site of breast (H)          Inflammatory arthritis   (R76.8) Positive AIDA (antinuclear antibody) (R76.8) Centromere antibody positive  Comment: AIDA pos 1:320 centromere.Anti sm, mitochondrial,RNP, SCL 70, ds dna neg. RF 13. CCP neg.Today she reports swelling of hands and feet associated with pain. ROS neg for raynaud's, difficulty swallowing, oral ulcers. She was on leflunomide in the past which was later stopped by  as he thought her problems were secondary to mechanical causes.Cannot use HCQ due to diabetic retinopathy. CT chest in 2023 did not reveal features of ILD. Will avoid methotrexate due to underlying CKD. Restarted leflunomide in Dec 2023.   Plan:   Will hold off on leflunomide for now given recent Dx.   Check labs.   Orders Placed This Encounter   Procedures     AST     ALT     CRP inflammation       (D64.9) Low hemoglobin  Comment: noted  Plan: ref to heme     (M15.9) Generalized  OA  Comment: OA of knees and hands   Plan: may use tylenol not to exceed 2 g in 24 hours as needed     Inflammatory arthritis   (R76.8) Rheumatoid factor positive  Comment: RF 13, CCP neg. She does have swelling of MCPs on exam which makes me think she does have an underlying inflammatory arthropathy.She was on leflunomide in the past which was later stopped by  as he thought her problems were secondary to mechanical causes.Cannot use HCQ due to diabetic retinopathy. CT chest in 2023 did not reveal features of ILD. Will avoid methotrexate due to underlying CKD.   Plan: as above     Breast cancer  Comment : Biopsy showed invasive ductal carcinoma with mucinous features efrem grade 2 of 3. Follows up with .   Plan : per oncology    RTC - 12 weeks         ALEXUS DOIP MD    Division of Rheumatic & Autoimmune Diseases  [facility]       Again, thank you for allowing me to participate in the care of your patient.      Sincerely,    Alexus Diop MD

## 2025-03-31 NOTE — PROGRESS NOTES
Chief Complaint/Reason for Visit: pos AIDA.     HPI:    Nena Tang is a 63 year old female with past medical history listed below. She denied having joint pain or swelling. She continues to be on leflunomide 20 mg daily. She continues to have leg cramps.     REVIEW OF SYSTEMS  Cardiovascular - neg for chest pain or sob.   Respiratory - neg for cough or sob   Skin - neg for skin rashes       Past Medical History:   Diagnosis Date    Acute respiratory failure with hypoxia (H) 09/04/2017    CAD (coronary artery disease)     5/2014 cath, nonbostructive stenosis to LAD, RCA.    Chronic low back pain 01/22/2013    Cocaine abuse, in remission (H)     Fecal urgency 03/08/2012    History of heroin abuse (H)     Hyperlipidemia LDL goal <100 10/31/2010    Hypertension 07/29/2013    Illiterate 08/30/2011    Irritable bowel syndrome     Left cataract     Migraine 04/19/2012    Migraine headache 04/22/2013    Moderate major depression (H) 06/08/2011    Noncompliance with medication regimen 06/08/2011    Obesity     CINDY (obstructive sleep apnea) 03/08/2012    does not use CPAP    CINDY (obstructive sleep apnea)- mild AHI 10.3     Osteopenia 10/07/2009    Pneumonia     Pneumonia of right lower lobe due to infectious organism 09/04/2017    Schizoaffective disorder, depressive type (H) 02/25/2013    Sepsis (H) 08/29/2017    Suicidal intent 10/02/2013    Takotsubo cardiomyopathy     Type 2 diabetes mellitus (H) 08/30/2011    Uterine cancer (H) 1983    Verbal auditory hallucination 10/04/2012     Past Surgical History:   Procedure Laterality Date    BIOPSY NODE SENTINEL Right 1/27/2025    Procedure: radiofrequency localized right axillary lymph node excision, right axillary sentinel lymph node biopsy;  Surgeon: Albino Giron MD;  Location: [facility]    CATARACT IOL, RT/LT Bilateral 2017    CHOLECYSTECTOMY      COLONOSCOPY N/A 03/16/2017    Procedure: COLONOSCOPY;  Surgeon: Traci Gonzalez,  MD;  Location: [facility]    Coronary CTA  2014    HYSTERECTOMY      uterine cancer yearly pap's per provider.    LAPAROSCOPIC CHOLECYSTECTOMY      LUMPECTOMY, BREAST, LOCALIZED USING RADIOFREQUENCY IDENTIFICATION Right 2025    Procedure: Radiofrequency localized right partial mastectomy;  Surgeon: Albino Giron MD;  Location:  OR    PHACOEMULSIFICATION CLEAR CORNEA WITH STANDARD INTRAOCULAR LENS IMPLANT Left 2017    Procedure: PHACOEMULSIFICATION CLEAR CORNEA WITH STANDARD INTRAOCULAR LENS IMPLANT;  LEFT EYE PHACOEMULSIFICATION CLEAR CORNEA WITH STANDARD INTRAOCULAR LENS IMPLANT ;  Surgeon: Tyra Diaz MD;  Location:  [facility]    PHACOEMULSIFICATION CLEAR CORNEA WITH STANDARD INTRAOCULAR LENS IMPLANT Right 2017    Procedure: PHACOEMULSIFICATION CLEAR CORNEA WITH STANDARD INTRAOCULAR LENS IMPLANT;  RIGHT EYE PHACOEMULSIFICATION CLEAR CORNEA WITH STANDARD INTRAOCULAR LENS IMPLANT;  Surgeon: Tyra Diaz MD;  Location:  [facility]    RELEASE TRIGGER FINGER  10/11/2012    Left thumb. Procedure: RELEASE TRIGGER FINGER;  LEFT THUMB TRIGGER RELEASE;  Surgeon: Tay Langley MD;  Location:  [facility]    RELEASE TRIGGER FINGER Right 2016    Procedure: RELEASE TRIGGER FINGER;  Surgeon: Albino Castañeda MD;  Location:  OR    ZZC OOPHORECTORMY FOR MALIG, W/BX      UTERINE     Family History   Problem Relation Age of Onset    Cancer Mother         BLADDER    Respiratory Mother         COPD    Gastrointestinal Disease Mother         CIRRHOSIS OF LI BOLIVAR    Alcohol/Drug Mother     Diabetes Mother     Hypertension Mother     Lipids Mother     C.A.D. Mother     Glaucoma Mother     Alcohol/Drug Sister     Mental Illness Sister     Alcohol/Drug Sister     Psychotic Disorder Sister     Cancer Maternal Grandmother         UNKNOWN TYPE    Cancer Brother         COLON    Cancer - colorectal Brother         IN HIS LATE 30S    Alcohol/Drug Brother          OF HEROIN OVERDOSE AT AGE 22  YRS    Macular Degeneration No family hx of      Social History     Socioeconomic History    Marital status:      Spouse name: None    Number of children: 2    Years of education: None    Highest education level: None   Tobacco Use    Smoking status: Never    Smokeless tobacco: Never   Vaping Use    Vaping Use: Never used   Substance and Sexual Activity    Alcohol use: Not Currently     Comment: when younger    Drug use: No     Comment: history of    Sexual activity: Not Currently     Partners: Male     Birth control/protection: None, Condom   Other Topics Concern     Service No    Blood Transfusions No    Caffeine Concern No    Occupational Exposure No    Hobby Hazards No    Sleep Concern Yes    Stress Concern Yes    Weight Concern Yes    Special Diet Yes     Comment: DM    Back Care Yes    Exercise Yes     Comment: WALKS DAILY    Seat Belt Yes    Self-Exams No     Comment: ENCOURAGED   Social History Narrative     10/2014. Has 2 sons. 7 grandchildren.         Unemployed. Graduated HS.         Tobacco use: Denies    Alcohol use: Escalated use since   10/2014    Drug: Denies     Social Determinants of Health     Financial Resource Strain: Low Risk  (10/3/2023)    Financial Resource Strain     Within the past 12 months, have you or your family members you live with been unable to get utilities (heat, electricity) when it was really needed?: No   Food Insecurity: Low Risk  (10/3/2023)    Food Insecurity     Within the past 12 months, did you worry that your food would run out before you got money to buy more?: No     Within the past 12 months, did the food you bought just not last and you didn t have money to get more?: No   Transportation Needs: Low Risk  (10/3/2023)    Transportation Needs     Within the past 12 months, has lack of transportation kept you from medical appointments, getting your medicines, non-medical meetings or appointments, work, or from getting things that you  need?: No   Interpersonal Safety: Not At Risk (8/25/2022)    Humiliation, Afraid, Rape, and Kick questionnaire     Fear of Current or Ex-Partner: No     Emotionally Abused: No     Physically Abused: No     Sexually Abused: No   Housing Stability: Low Risk  (10/3/2023)    Housing Stability     Do you have housing? : Yes     Are you worried about losing your housing?: No       Allergies   Allergen Reactions    Imidazole Antifungals Hives     Tolerates diflucan    Ketoprofen Itching     Pruritis to topical    Lisinopril Hives    Metformin Other (See Comments)     Patient hospitalized for lactic acidosis - admitting provider suspectd caused by metformin    Metronidazole Hives    Penicillins     Posaconazole Hives     Tolerates diflucan       Current Outpatient Medications   Medication Sig Dispense Refill    acetaminophen (TYLENOL) 500 MG tablet Take 2 tablets (1,000 mg) by mouth 3 times daily. 168 tablet 4    albuterol (PROAIR HFA/PROVENTIL HFA/VENTOLIN HFA) 108 (90 Base) MCG/ACT inhaler Inhale 2 puffs into the lungs every 6 hours as needed for shortness of breath, wheezing or cough 18 g 3    Alcohol Swabs (EASY TOUCH ALCOHOL PREP MEDIUM) 70 % PADS APPLY 1 UNITS TOPICALLY 8 TIMES DAILY 200 each 3    amantadine (SYMMETREL) 100 MG capsule TAKE 1 CAPSULE BY MOUTH DAILY 28 capsule 11    amLODIPine (NORVASC) 10 MG tablet Take 1 tablet (10 mg) by mouth every morning. 30 tablet 11    anastrozole (ARIMIDEX) 1 MG tablet Take 1 tablet (1 mg) by mouth daily. 90 tablet 3    aspirin (ASPIRIN LOW DOSE) 81 MG EC tablet Take 1 tablet (81 mg) by mouth daily. 28 tablet 11    atorvastatin (LIPITOR) 80 MG tablet TAKE 1 TABLET BY MOUTH DAILY 28 tablet 5    blood glucose (ONETOUCH ULTRA) test strip USE TO TEST BLOOD SUGAR 10 TIMES DAILY OR AS DIRECTED. 300 strip 11    buPROPion (WELLBUTRIN XL) 150 MG 24 hr tablet Take 1 tablet (150 mg) by mouth every morning. 30 tablet 11    calcium carbonate (OS-ALLEN) 1500 (600 Ca) MG tablet TAKE 1 TABLET BY  MOUTH DAILY 28 tablet 5    chlorhexidine (PERIDEX) 0.12 % solution Swish and spit 10 mLs in mouth 2 times daily 1893 mL 3    Cholecalciferol (D3 HIGH POTENCY) 25 MCG (1000 UT) CAPS TAKE 2 CAPSULES BY MOUTH DAILY 180 capsule 2    clonazePAM (KLONOPIN) 0.5 MG tablet TAKE 1 TABLET BY MOUTH AT BEDTIME 28 tablet 5    Continuous Glucose Sensor (DEXCOM G6 SENSOR) MISC Change every 10 days. 9 each 4    Continuous Glucose Transmitter (DEXCOM G6 TRANSMITTER) MISC Change every 3 months. 1 each 4    diclofenac (VOLTAREN) 1 % topical gel APPLY 4 GRAMS TO PAINFUL AREA AT BEDTIME . MAY USE AN ADDITIONAL 3 DOSES DURING 100 g 1    FIBER- MG tablet TAKE 2 TABLETS BY MOUTH TWICE A WEEK . TAKE WEDNESDAY WITH OZEMPIC AND THURSDAY DAY AFTER OZEMPIC. DISCONTINUE IF DIARRHEA 16 tablet 2    gabapentin (NEURONTIN) 300 MG capsule TAKE 1 CAPSULE BY MOUTH AT BEDTIME 28 capsule 11    HUMALOG KWIKPEN 100 UNIT/ML soln Inject 0-12 Units subcutaneously 4 times daily (with meals and nightly) Before meals: BG 81 - 150:  0 units 151 - 200: 2 units, 201 - 250: 4 units, 251-300: 6 units, 301 - 350: 8 units, 351 or greater : 10 units At BEDTIME-If >4 hours since last Humalog injection For  - 249 give 1 units, 250 - 299 give 2 units,300 - 349 give 3 units, = or > 350 give 4 units. 15 mL 1    HYDROcodone-acetaminophen (NORCO) 5-325 MG tablet Take 1 tablet by mouth every 6 hours as needed for severe pain. 10 tablet 0    hydrOXYzine (VISTARIL) 25 MG capsule TAKE 1 CAPSULE BY MOUTH EVERY NIGHT AT BEDTIME ANXIETY/SLEEP 28 capsule 0    ibuprofen (ADVIL/MOTRIN) 200 MG tablet Take 200 mg by mouth every 4 hours as needed for pain      insulin glargine (LANTUS SOLOSTAR) 100 UNIT/ML pen Inject 22 Units subcutaneously daily. 45 mL 1    insulin pen needle (BD AUTOSHIELD DUO) 30G X 5 MM USE 6 DAILY OR AS DIRECTED 200 each 10    Lancets (ONETOUCH DELICA PLUS [id number]) MISC 1 EACH AS NEEDED  USE TO TEST BLOOD SUGARS 1-2 TIMES DAILY AS DIRECTED 100 each 3     leflunomide (ARAVA) 20 MG tablet TAKE 1 TABLET BY MOUTH DAILY *LABS EVERY 8-12 WEEKS 30 tablet 1    Lidocaine (LIDOCAINE PAIN RELIEF) 4 % Patch APPLY 1 PATCH ONTO SKIN EVERY 24 HOURS as needed; APPLY AT NIGHT AND REMOVE IN THE MORNING ( TWELVE HOURS PATCH FREE). 30 patch 11    loperamide (IMODIUM) 2 MG capsule TAKE 1 CAPSULE BY MOUTH FOUR TIMES A DAY AS NEEDED FOR DIARRHEA 30 capsule 1    losartan (COZAAR) 100 MG tablet TAKE 1 TABLET BY MOUTH DAILY 28 tablet 11    Magnesium Oxide 250 MG TABS Take 1 tablet (250 mg) by mouth at bedtime. 28 tablet 11    melatonin 5 MG tablet Take 5 mg by mouth at bedtime      metoprolol succinate ER (TOPROL XL) 50 MG 24 hr tablet TAKE 1 TABLET BY MOUTH IN THE MORNING AND TAKE 2 TABLETS AT BEDTIME 84 tablet 11    mirabegron (MYRBETRIQ) 50 MG 24 hr tablet Take 1 tablet (50 mg) by mouth daily. 90 tablet 5    naproxen (NAPROSYN) 375 MG tablet Take 1 tablet (375 mg) by mouth 2 times daily as needed for moderate pain (denatl pain) 60 tablet 11    NYAMYC 454541 UNIT/GM external powder APPLY TOPICALLY TWICE A DAY AS NEEDED 60 g 0    ondansetron (ZOFRAN) 4 MG tablet TAKE 1 TABLET BY MOUTH EVERY 8 HOURS AS NEEDED FOR NAUSEA 10 tablet 1    order for DME Equipment being ordered: Depends. 30 each 4    order for DME Equipment being ordered: CPAP Supplies. 1 each 0    oxyCODONE (ROXICODONE) 5 MG tablet Take 1 tablet (5 mg) by mouth every 6 hours as needed for moderate to severe pain. 8 tablet 0    paliperidone ER (INVEGA) 6 MG 24 hr tablet TAKE 2 TABLETS BY MOUTH EVERY NIGHT AT BEDTIME 60 tablet 11    pantoprazole (PROTONIX) 40 MG EC tablet Take 1 tablet (40 mg) by mouth daily. 28 tablet 11    prazosin (MINIPRESS) 1 MG capsule Take 1 mg by mouth at bedtime      QUEtiapine (SEROQUEL) 100 MG tablet Take 100 mg by mouth at bedtime      senna-docusate (SENOKOT S) 8.6-50 MG tablet Take 1 tablet by mouth daily as needed for constipation. 30 tablet 11    senna-docusate (SENOKOT-S/PERICOLACE) 8.6-50 MG  tablet Take 1 tablet by mouth 2 times daily as needed for constipation (While on oral opioids.). 10 tablet 0    sertraline (ZOLOFT) 100 MG tablet Take 100 mg by mouth daily      Tirzepatide 7.5 MG/0.5ML SOAJ Inject 0.5 mLs (7.5 mg) subcutaneously every 7 days. 2 mL 11    traZODone (DESYREL) 100 MG tablet TAKE 1 TABLET BY MOUTH AT BEDTIME 28 tablet 11    vitamin C (ASCORBIC ACID) 500 MG tablet TAKE 2 TABLETS BY MOUTH IN THE MORNING AND TAKE 2 TABLETS BY MOUTH IN THE EVENING 112 tablet 8    zinc oxide (DESITIN) 20 % external ointment Apply topically 3 times daily as needed for irritation (barrier cream) 60 g 3     Current Facility-Administered Medications   Medication Dose Route Frequency Provider Last Rate Last Admin    apraclonidine (IOPIDINE) 1 % ophthalmic solution 1 drop  1 drop Both Eyes Once Tiburcio Chacon MD           PHYSICAL EXAM    /78 (BP Location: Right arm, Patient Position: Sitting, Cuff Size: Adult Large)   Pulse 68   Wt 91.6 kg (202 lb)   LMP 01/06/2015 (Exact Date)   SpO2 95%   BMI 38.14 kg/m      General: Alert, No apparent distress  Psych: Affect euthymic  Eyes: Sclera noninjected  Skin: No rashes noted  Cardiovascular: Regular rate and rhythm, Normal S1 and S2 and No murmurs, rubs or gallops  Respiratory: Clear to auscultation with no wheezing or crackles  Musculoskeletal: Hands:  No synovitis.    GI - abdomen non tender   Wrists:  Normal.  Elbows:  Normal.  Shoulders:  Normal.    LABS   Reviewed as below.     March 2025  CBC - Normal wbc, low Hb at 9.2 and plt normal   Creatinine elevated at 1.04    April 2024  Hep B surface ag neg     July and Sept 2023  CK, folate, vitamin B12, TSH, Vit D, LFT  normal   C4 - 42 and C3 161 (elevated)  Anti sm, mitochondrial,RNP, SCL 70, ds dna neg  Centromere pos   AIDA pos 1:320 centromere   RF 13  CCP neg     June 2023  ESR, CRP normal      Latest Reference Range & Units 07/13/16 13:50 09/06/18 12:01 04/02/24 12:03   Hep B Surface  Agn Nonreactive    Nonreactive   Hepatitis C Antibody NR  Nonreactive   Assay performance characteristics have not been established for newborns,   infants, and children       HIV Antigen Antibody Combo NR^Nonreactive      Nonreactive          EXAM: CT CHEST PE ABDOMEN PELVIS W CONTRAST  LOCATION: [facility]  DATE: 8/17/2023     INDICATION: right sided flank pain, pain with breatihng. evaluate for PE, kidney stone  COMPARISON: CT chest 6/27/23. CT abdomen and pelvis 6/25/23.  TECHNIQUE: CT chest pulmonary angiogram and routine CT abdomen pelvis with IV contrast. Arterial phase through the chest and venous phase through the abdomen and pelvis. Multiplanar reformats and MIP reconstructions were performed. Dose reduction   techniques were used.   CONTRAST: 122 mL Isovue   370     FINDINGS:  ANGIOGRAM CHEST: No pulmonary embolus. No aortic dissection or aneurysm.     LUNGS AND PLEURA: Normal.     MEDIASTINUM/AXILLAE: Normal.     CORONARY ARTERY CALCIFICATION: Moderate.      HEPATOBILIARY: Cholecystectomy.     PANCREAS: Normal.     SPLEEN: Normal.     ADRENAL GLANDS: Normal.     KIDNEYS/BLADDER: Normal.     BOWEL: Normal. No obstruction or inflammation. Normal appendix.     LYMPH NODES: Normal.     VASCULATURE: Mild to moderate aortoiliac atherosclerosis.     PELVIC ORGANS: Hysterectomy.     MUSCULOSKELETAL: Mild degenerative changes of the spine.                                                                      IMPRESSION:  No acute process in the chest, abdomen or pelvis.      KNEE BILATERAL THREE VIEWS  6/22/2023 2:01 PM      HISTORY: Bilateral knee pain.     COMPARISON: None.                                                                       IMPRESSION:  Mild patellofemoral compartment degenerative change on  both sides. There is no evidence of an acute fracture. Joint effusion  is not assessed due to obliquity of the lateral views. Atherosclerotic  vascular calcifications.      TTE  Interpretation Summary     Left ventricular systolic function is normal.  The visual ejection fraction is estimated at 60-65%.  Right ventricular systolic pressure is elevated, consistent with mild  pulmonary hypertension. This is new compared to 2014.  The study was technically difficult.       ASSESSMENT      1. Inflammatory arthritis    2. Rheumatoid factor positive    3. Positive AIDA (antinuclear antibody)    4. Low hemoglobin    5. Generalized OA    6. Polyarthralgia    7. Malignant neoplasm of right female breast, unspecified estrogen receptor status, unspecified site of breast (H)          Inflammatory arthritis   (R76.8) Positive AIDA (antinuclear antibody) (R76.8) Centromere antibody positive  Comment: AIDA pos 1:320 centromere.Anti sm, mitochondrial,RNP, SCL 70, ds dna neg. RF 13. CCP neg.Today she reports swelling of hands and feet associated with pain. ROS neg for raynaud's, difficulty swallowing, oral ulcers. She was on leflunomide in the past which was later stopped by  as he thought her problems were secondary to mechanical causes.Cannot use HCQ due to diabetic retinopathy. CT chest in 2023 did not reveal features of ILD. Will avoid methotrexate due to underlying CKD. Restarted leflunomide in Dec 2023.   Plan:   Will hold off on leflunomide for now given recent Dx.   Check labs.   Orders Placed This Encounter   Procedures    AST    ALT    CRP inflammation       (D64.9) Low hemoglobin  Comment: noted  Plan: ref to heme     (M15.9) Generalized OA  Comment: OA of knees and hands   Plan: may use tylenol not to exceed 2 g in 24 hours as needed     Inflammatory arthritis   (R76.8) Rheumatoid factor positive  Comment: RF 13, CCP neg. She does have swelling of MCPs on exam which makes me think she does have an underlying inflammatory arthropathy.She was on leflunomide in the past which was later stopped by  as he thought her problems were secondary to mechanical causes.Cannot use HCQ due  to diabetic retinopathy. CT chest in 2023 did not reveal features of ILD. Will avoid methotrexate due to underlying CKD.   Plan: as above     Breast cancer  Comment : Biopsy showed invasive ductal carcinoma with mucinous features efrem grade 2 of 3. Follows up with .   Plan : per oncology    RTC - 12 weeks         JEWEL DIOP MD    Division of Rheumatic & Autoimmune Diseases  [facility]

## 2025-03-31 NOTE — TELEPHONE ENCOUNTER
Called pt and her caregiver Amal and LVM. Per dr. Posey, pt is to stop leflunomide due to cancer Dx and wants gisel to follow up in 6 months instead of 4.  Waiting on call back.

## 2025-03-31 NOTE — NURSING NOTE
Chief Complaint   Patient presents with    RECHECK     /78 (BP Location: Right arm, Patient Position: Sitting, Cuff Size: Adult Large)   Pulse 68   Wt 91.6 kg (202 lb)   LMP 01/06/2015 (Exact Date)   SpO2 95%   BMI 38.14 kg/m

## 2025-04-01 ENCOUNTER — TELEPHONE (OUTPATIENT)
Dept: FAMILY MEDICINE | Facility: CLINIC | Age: 65
End: 2025-04-01
Payer: COMMERCIAL

## 2025-04-01 NOTE — TELEPHONE ENCOUNTER
Forms/Letter Request    Type of form/letter: DME (wheelchair, hospital bed)    Type of DME requested: SHOE INSERTS    Do we have the form/letter: Yes:     Who is the form from? Patient    Where did/will the form come from? form was faxed in    When is form/letter needed by: ASAP    How would you like the form/letter returned: Fax : [phone]    Patient Notified form requests are processed in 5-7 business days:Yes    Could we send this information to you in [employer] or would you prefer to receive a phone call?:   Patient would like to be contacted via [employer]

## 2025-04-03 ENCOUNTER — TELEPHONE (OUTPATIENT)
Dept: FAMILY MEDICINE | Facility: CLINIC | Age: 65
End: 2025-04-03

## 2025-04-03 NOTE — TELEPHONE ENCOUNTER
Forms/Letter Request    Type of form/letter: Home Health Certification      Do we have the form/letter: Yes:  BOX    Who is the form from? Home care    Where did/will the form come from? form was faxed in    When is form/letter needed by: ASAP    How would you like the form/letter returned: Fax : [phone]    Patient Notified form requests are processed in 5-7 business days:Yes    Could we send this information to you in [employer] or would you prefer to receive a phone call?:   Patient would like to be contacted via [employer]

## 2025-04-07 ENCOUNTER — MEDICAL CORRESPONDENCE (OUTPATIENT)
Dept: HEALTH INFORMATION MANAGEMENT | Facility: CLINIC | Age: 65
End: 2025-04-07
Payer: COMMERCIAL

## 2025-04-17 ENCOUNTER — OFFICE VISIT (OUTPATIENT)
Dept: SURGERY | Facility: CLINIC | Age: 65
End: 2025-04-17
Payer: COMMERCIAL

## 2025-04-17 DIAGNOSIS — Z09 FOLLOW-UP EXAMINATION FOLLOWING SURGERY: Primary | ICD-10-CM

## 2025-04-17 ASSESSMENT — PATIENT HEALTH QUESTIONNAIRE - PHQ9: SUM OF ALL RESPONSES TO PHQ QUESTIONS 1-9: 6

## 2025-04-17 NOTE — PROGRESS NOTES
Surgery Postop Note    Nena Tang presents today for surgical followup.  she is doing well following right lumpectomy.  Her wound is healing quite well.  Currently it measures about 2 x 2 cm with much less depth.  No tunneling.  I think she can have another conversation with radiation oncology about starting adjuvant whole breast radiation.  If they need her skin to close prior to starting therapy, I expect that will be for another 4 weeks or so.  Do not see any evidence for ongoing infection. Thank you for the opportunity to help in her care.    Steve Giron M.D.  [facility]  [phone]    Please route or send letter to:  Primary Care Provider (PCP) and Referring Provider

## 2025-04-29 ENCOUNTER — OFFICE VISIT (OUTPATIENT)
Dept: FAMILY MEDICINE | Facility: CLINIC | Age: 65
End: 2025-04-29
Attending: FAMILY MEDICINE
Payer: COMMERCIAL

## 2025-04-29 ENCOUNTER — OFFICE VISIT (OUTPATIENT)
Dept: PHARMACY | Facility: CLINIC | Age: 65
End: 2025-04-29
Payer: COMMERCIAL

## 2025-04-29 VITALS
BODY MASS INDEX: 38.52 KG/M2 | SYSTOLIC BLOOD PRESSURE: 141 MMHG | HEART RATE: 67 BPM | WEIGHT: 204 LBS | OXYGEN SATURATION: 99 % | DIASTOLIC BLOOD PRESSURE: 82 MMHG | TEMPERATURE: 96.3 F

## 2025-04-29 VITALS — DIASTOLIC BLOOD PRESSURE: 82 MMHG | HEART RATE: 87 BPM | SYSTOLIC BLOOD PRESSURE: 141 MMHG

## 2025-04-29 DIAGNOSIS — Z79.4 TYPE 2 DIABETES MELLITUS WITH MILD NONPROLIFERATIVE RETINOPATHY WITHOUT MACULAR EDEMA, WITH LONG-TERM CURRENT USE OF INSULIN, UNSPECIFIED LATERALITY (H): ICD-10-CM

## 2025-04-29 DIAGNOSIS — E11.3299 TYPE 2 DIABETES MELLITUS WITH MILD NONPROLIFERATIVE RETINOPATHY WITHOUT MACULAR EDEMA, WITH LONG-TERM CURRENT USE OF INSULIN, UNSPECIFIED LATERALITY (H): ICD-10-CM

## 2025-04-29 DIAGNOSIS — Z00.00 ENCOUNTER FOR MEDICARE ANNUAL WELLNESS EXAM: Primary | ICD-10-CM

## 2025-04-29 DIAGNOSIS — I10 ESSENTIAL HYPERTENSION WITH GOAL BLOOD PRESSURE LESS THAN 130/80: ICD-10-CM

## 2025-04-29 DIAGNOSIS — N18.30 TYPE 2 DIABETES MELLITUS WITH STAGE 3 CHRONIC KIDNEY DISEASE, WITH LONG-TERM CURRENT USE OF INSULIN, UNSPECIFIED WHETHER STAGE 3A OR 3B CKD (H): Primary | ICD-10-CM

## 2025-04-29 DIAGNOSIS — Z79.4 TYPE 2 DIABETES MELLITUS WITH STAGE 3 CHRONIC KIDNEY DISEASE, WITH LONG-TERM CURRENT USE OF INSULIN, UNSPECIFIED WHETHER STAGE 3A OR 3B CKD (H): Primary | ICD-10-CM

## 2025-04-29 DIAGNOSIS — R21 RASH AND NONSPECIFIC SKIN ERUPTION: ICD-10-CM

## 2025-04-29 DIAGNOSIS — E11.22 TYPE 2 DIABETES MELLITUS WITH STAGE 3 CHRONIC KIDNEY DISEASE, WITH LONG-TERM CURRENT USE OF INSULIN, UNSPECIFIED WHETHER STAGE 3A OR 3B CKD (H): Primary | ICD-10-CM

## 2025-04-29 DIAGNOSIS — R19.7 DIARRHEA, UNSPECIFIED TYPE: ICD-10-CM

## 2025-04-29 DIAGNOSIS — I25.119 CORONARY ARTERY DISEASE INVOLVING NATIVE HEART WITH ANGINA PECTORIS, UNSPECIFIED VESSEL OR LESION TYPE: ICD-10-CM

## 2025-04-29 DIAGNOSIS — I10 HTN, GOAL BELOW 140/90: ICD-10-CM

## 2025-04-29 DIAGNOSIS — F25.1 SCHIZOAFFECTIVE DISORDER, DEPRESSIVE TYPE (H): ICD-10-CM

## 2025-04-29 DIAGNOSIS — D64.9 ANEMIA, UNSPECIFIED TYPE: ICD-10-CM

## 2025-04-29 DIAGNOSIS — G47.62 NOCTURNAL LEG CRAMPS: ICD-10-CM

## 2025-04-29 PROBLEM — L89.320 PRESSURE INJURY OF LEFT BUTTOCK, UNSTAGEABLE (H): Status: RESOLVED | Noted: 2025-03-11 | Resolved: 2025-04-29

## 2025-04-29 PROCEDURE — 3079F DIAST BP 80-89 MM HG: CPT | Performed by: PHARMACIST

## 2025-04-29 PROCEDURE — 3077F SYST BP >= 140 MM HG: CPT | Performed by: FAMILY MEDICINE

## 2025-04-29 PROCEDURE — 3077F SYST BP >= 140 MM HG: CPT | Performed by: PHARMACIST

## 2025-04-29 PROCEDURE — 3079F DIAST BP 80-89 MM HG: CPT | Performed by: FAMILY MEDICINE

## 2025-04-29 PROCEDURE — G0439 PPPS, SUBSEQ VISIT: HCPCS | Performed by: FAMILY MEDICINE

## 2025-04-29 PROCEDURE — 99207 PR FOOT EXAM NO CHARGE: CPT | Performed by: FAMILY MEDICINE

## 2025-04-29 PROCEDURE — 1126F AMNT PAIN NOTED NONE PRSNT: CPT | Performed by: FAMILY MEDICINE

## 2025-04-29 PROCEDURE — 99606 MTMS BY PHARM EST 15 MIN: CPT | Performed by: PHARMACIST

## 2025-04-29 PROCEDURE — G2211 COMPLEX E/M VISIT ADD ON: HCPCS | Performed by: FAMILY MEDICINE

## 2025-04-29 PROCEDURE — 99607 MTMS BY PHARM ADDL 15 MIN: CPT | Performed by: PHARMACIST

## 2025-04-29 PROCEDURE — 99214 OFFICE O/P EST MOD 30 MIN: CPT | Mod: 25 | Performed by: FAMILY MEDICINE

## 2025-04-29 RX ORDER — LOSARTAN POTASSIUM AND HYDROCHLOROTHIAZIDE 12.5; 1 MG/1; MG/1
1 TABLET ORAL DAILY
Qty: 30 TABLET | Refills: 11 | Status: SHIPPED | OUTPATIENT
Start: 2025-04-29

## 2025-04-29 RX ORDER — LOPERAMIDE HYDROCHLORIDE 2 MG/1
2 CAPSULE ORAL 4 TIMES DAILY PRN
Qty: 30 CAPSULE | Refills: 11 | Status: SHIPPED | OUTPATIENT
Start: 2025-04-29

## 2025-04-29 RX ORDER — MUPIROCIN 20 MG/G
OINTMENT TOPICAL 2 TIMES DAILY
Qty: 15 G | Refills: 0 | Status: SHIPPED | OUTPATIENT
Start: 2025-04-29

## 2025-04-29 RX ORDER — PROCHLORPERAZINE 25 MG/1
SUPPOSITORY RECTAL
Qty: 1 EACH | Refills: 4 | Status: SHIPPED | OUTPATIENT
Start: 2025-04-29

## 2025-04-29 RX ORDER — MULTIVIT WITH MINERALS/LUTEIN
400 TABLET ORAL DAILY
Qty: 30 CAPSULE | Refills: 11 | Status: SHIPPED | OUTPATIENT
Start: 2025-04-29

## 2025-04-29 SDOH — HEALTH STABILITY: PHYSICAL HEALTH: ON AVERAGE, HOW MANY DAYS PER WEEK DO YOU ENGAGE IN MODERATE TO STRENUOUS EXERCISE (LIKE A BRISK WALK)?: 2 DAYS

## 2025-04-29 SDOH — HEALTH STABILITY: PHYSICAL HEALTH: ON AVERAGE, HOW MANY MINUTES DO YOU ENGAGE IN EXERCISE AT THIS LEVEL?: 60 MIN

## 2025-04-29 ASSESSMENT — PAIN SCALES - GENERAL: PAINLEVEL_OUTOF10: NO PAIN (0)

## 2025-04-29 ASSESSMENT — PATIENT HEALTH QUESTIONNAIRE - PHQ9
10. IF YOU CHECKED OFF ANY PROBLEMS, HOW DIFFICULT HAVE THESE PROBLEMS MADE IT FOR YOU TO DO YOUR WORK, TAKE CARE OF THINGS AT HOME, OR GET ALONG WITH OTHER PEOPLE: NOT DIFFICULT AT ALL
SUM OF ALL RESPONSES TO PHQ QUESTIONS 1-9: 3
SUM OF ALL RESPONSES TO PHQ QUESTIONS 1-9: 3

## 2025-04-29 ASSESSMENT — SOCIAL DETERMINANTS OF HEALTH (SDOH): HOW OFTEN DO YOU GET TOGETHER WITH FRIENDS OR RELATIVES?: TWICE A WEEK

## 2025-04-29 NOTE — PROGRESS NOTES
Medication Therapy Management (MTM) Encounter    ASSESSMENT:                            Medication Adherence/Access: No issues identified.    Diabetes  Stable. Discussed use of the CGM glucose trend arrow at bedtime to prevent overnight hypoglycemia.    Hypertension:   Borderline readings, see PCP notes     Mental health  Continue current supports and monitor, followup plan in place with psychiatry.     PLAN:                            Start hydrochlorothiazide per PCP    Follow-up: 2 months    SUBJECTIVE/OBJECTIVE:                          Nena Tang is a 65 year old female seen for a follow-up visit. Patient was accompanied by group home staff. Patient is seeing provider after our visit today.      Reason for visit: medication recheck.    Allergies/ADRs: Reviewed in chart  Past Medical History: Reviewed in chart  Tobacco: She reports that she has never smoked. She has never used smokeless tobacco.  Alcohol: not currently using    Medication Adherence/Access: no issues reported.  Patient has assistance with medication administration: group home.    Diabetes   Lantus 22 units once daily  Humalog sliding scale  Mounjaro 7.5mg weekly    Side effects: none    Current diabetes symptoms: none  Rarely hypoglycemia       Blood sugar monitoring: Continuous Glucose Monitor see AGP below  Eye exam is up to date  Foot exam: up to date      Hypertension   Amlodipine 10mg daily  Metoprolol XL 50mg AM and 100mg PM  Losartan 100mg daily  Prazosin 1mg at bedtime (per psychiatry)  Patient reports no current medication side effects.   Patient has blood pressure monitored by group home.  Home BP monitoring :  140s systolic and 80s diastolic.       Mental Health   Schizoaffective:  Quetiapine 100mg at bedtime   Paliperidone ER 12mg at bedtime  Sertraline 100mg daily  Bupropion XL 150mg daily   Hydroxyzine 25mg in the evening   Trazodone - stopped at last psychiatry visit 1 week ago to decrease polypharmacy  Clonazepam 0.5mg at  "bedtime  Prazosin 1mg at bedtime  Amantadine 100mg daily     Patient reports mood has been labile. Feels down at times in the day, tearful. Staff is able to redirect her. Continues to go to drop in center regularly.   Sleep: OK, no concerns  Appetite: normal  Patient is seeing a psychiatrist.                Today's Vitals: LMP 01/06/2015 (Exact Date)   BP Readings from Last 1 Encounters:   04/29/25 (!) 141/82     Pulse Readings from Last 1 Encounters:   04/29/25 67     Wt Readings from Last 1 Encounters:   04/29/25 204 lb (92.5 kg)     Ht Readings from Last 1 Encounters:   03/11/25 5' 1.02\" (1.55 m)     Estimated body mass index is 38.52 kg/m  as calculated from the following:    Height as of 3/11/25: 5' 1.02\" (1.55 m).    Weight as of an earlier encounter on 4/29/25: 204 lb (92.5 kg).    Temp Readings from Last 1 Encounters:   04/29/25 (!) 96.3  F (35.7  C) (Temporal)      ----------------      I spent 30 minutes with this patient today. All changes were made via collaborative practice agreement with Albino Rainey MD.     A summary of these recommendations was given to the patient (see AVS from today's appointment with PCP).    Eugenia De Jesus, PharmD, BCACP   Medication Therapy Management Pharmacist   [facility]  [phone]          Medication Therapy Recommendations  No medication therapy recommendations to display   "

## 2025-04-29 NOTE — PROGRESS NOTES
Answers submitted by the patient for this visit:  Patient Health Questionnaire (Submitted on 4/29/2025)  If you checked off any problems, how difficult have these problems made it for you to do your work, take care of things at home, or get along with other people?: Not difficult at all  PHQ9 TOTAL SCORE: 3  Preventive Care Visit  [facility]  Albino Rainey MD, Family Medicine  Apr 29, 2025      Assessment & Plan     Encounter for Medicare annual wellness exam  Wellness issues were reviewed.  AVS information was provided.  Follow-up is recommended in 1 year.  - PRIMARY CARE FOLLOW-UP SCHEDULING; Future    Type 2 diabetes mellitus with mild nonproliferative retinopathy without macular edema, with long-term current use of insulin, unspecified laterality (H)  Recent control has been very good.  CGM was refilled by [facility] pharmacy.  Foot examination today was normal.  - Continuous Glucose Transmitter (DEXCOM G6 TRANSMITTER) MISC; Change every 3 months.  - FOOT EXAM    Diarrhea, unspecified type  Loperamide was refilled.  Not currently having significant episodes of diarrhea.  - loperamide (IMODIUM) 2 MG capsule; Take 1 capsule (2 mg) by mouth 4 times daily as needed for diarrhea.    Anemia, unspecified type  Referral to hematology was recommended by rheumatology but not placed so that was put in today.  - Adult Oncology/Hematology  Referral; Future    Nocturnal leg cramps  Suggested the addition of vitamin EE to see if this might be helpful for the nighttime leg cramps.  - vitamin E (TOCOPHEROL) 400 units (180 mg) capsule; Take 1 capsule (400 Units) by mouth daily.    Coronary artery disease involving native heart with angina pectoris, unspecified vessel or lesion type  Currently asymptomatic and on medical management.    Essential hypertension with goal blood pressure less than 130/80  Blood pressure today was consistent with how they are running at the [facility] home  staff accompanying the patient today.  The patient would like to see better control so we talked about some options including the addition of a diuretic for increasing her beta-blocker and opted for the former at this time.  - losartan-hydrochlorothiazide (HYZAAR) 100-12.5 MG tablet; Take 1 tablet by mouth daily.    Rash and nonspecific skin eruption  She thought she had an open area at the top of her gluteal cleft but examination showed just a couple small pustules.  There is no cellulitis.  Prescription was issued for Bactroban ointment.  - mupirocin (BACTROBAN) 2 % external ointment; Apply topically 2 times daily. To small rash at top of buttocks until clear    Patient has been advised of split billing requirements and indicates understanding: Yes    The longitudinal plan of care for the diagnosis(es)/condition(s) as documented were addressed during this visit. Due to the added complexity in care, I will continue to support Nena in the subsequent management and with ongoing continuity of care.    Counseling  Appropriate preventive services were addressed with this patient via screening, questionnaire, or discussion as appropriate for fall prevention, nutrition, physical activity, Tobacco-use cessation, social engagement, weight loss and cognition.  Checklist reviewing preventive services available has been given to the patient.  Reviewed patient's diet, addressing concerns and/or questions.   She is at risk for lack of exercise and has been provided with information to increase physical activity for the benefit of her well-being.   The patient was instructed to see the dentist every 6 months.       Follow-up    Follow-up Visit   Expected date:  May 29, 2025 (Approximate)      Follow Up Appointment Details:     Follow-up with whom?: Me    Follow-Up for what?: Chronic Disease f/u    Chronic Disease f/u:  Anxiety  Depression  Diabetes       How?: In Person    Is this an as-needed follow-up?: No              Follow-up Visit   Expected date:  May 06, 2026 (Approximate)      Follow Up Appointment Details:     Follow-up with whom?: PCP    Follow-Up for what?: Medicare Wellness    Welcome or Annual?: Annual Wellness    How?: In Person                 Subjective   Nena is a 65 year old, presenting for the following:  Wellness Visit and Leg Problem (Leg cramps in the left leg still happening)        4/29/2025    10:25 AM   Additional Questions   Roomed by Arlet RIBEIRO   Accompanied by Caregivers - Kerline and Antonina         4/29/2025   Forms   Any forms needing to be completed Yes           HPI         Patient is here today for an annual wellness visit.  Overall she feels that she is doing pretty well.  Blood pressures are similar at the group home per staff accompanying her today.  Rheumatology follow-up was recently reviewed and it looks like her leflunomide has been held without worsening of her pain.  She did have an open area on her left lateral thigh but this has healed.  She is now complaining of an itchy area at the top of her gluteal cleft and is worried that this is open.  Unsure when it started when she does not inform staff of this previously.  Her breast wound has almost completely healed and surgery has suggested that she is ready to see radiation oncology again for consideration of radiation therapy for her breast cancer diagnosis.    Answers submitted by the patient for this visit:  Patient Health Questionnaire (Submitted on 4/29/2025)  If you checked off any problems, how difficult have these problems made it for you to do your work, take care of things at home, or get along with other people?: Not difficult at all  PHQ9 TOTAL SCORE: 3    Advance Care Planning    Document on file is a Health Care Directive or POLST.        4/29/2025   General Health   How would you rate your overall physical health? Good   Feel stress (tense, anxious, or unable to sleep) Only a little   (!) STRESS CONCERN      4/29/2025    Nutrition   Diet: I don't know         4/29/2025   Exercise   Days per week of moderate/strenous exercise 2 days   Average minutes spent exercising at this level 60 min   (!) EXERCISE CONCERN      4/29/2025   Social Factors   Frequency of gathering with friends or relatives Twice a week   Worry food won't last until get money to buy more No   Food not last or not have enough money for food? No   Do you have housing? (Housing is defined as stable permanent housing and does not include staying outside in a car, in a tent, in an abandoned building, in an overnight shelter, or couch-surfing.) Yes   Are you worried about losing your housing? No   Lack of transportation? No   Unable to get utilities (heat,electricity)? No         4/29/2025   Fall Risk   Fallen 2 or more times in the past year? No   Trouble with walking or balance? No          4/29/2025   Activities of Daily Living- Home Safety   Needs help with the following daily activites None of the above   Safety concerns in the home None of the above         4/29/2025   Dental   Dentist two times every year? (!) NO         4/29/2025   Hearing Screening   Hearing concerns? None of the above         4/29/2025   Driving Risk Screening   Patient/family members have concerns about driving No         4/29/2025   General Alertness/Fatigue Screening   Have you been more tired than usual lately? No         4/29/2025   Urinary Incontinence Screening   Bothered by leaking urine in past 6 months No       Today's PHQ-9 Score:       4/29/2025     8:52 AM   PHQ-9 SCORE   PHQ-9 Total Score MyChart 3 (Minimal depression)   PHQ-9 Total Score 3        Patient-reported         4/29/2025   Substance Use   Alcohol more than 3/day or more than 7/wk Not Applicable   Do you have a current opioid prescription? No   How severe/bad is pain from 1 to 10? 5/10   Do you use any other substances recreationally? No     Social History     Tobacco Use    Smoking status: Never    Smokeless tobacco:  Never   Vaping Use    Vaping status: Never Used   Substance Use Topics    Alcohol use: Not Currently     Comment: when younger    Drug use: No     Comment: history of           12/17/2024   LAST FHS-7 RESULTS   1st degree relative breast or ovarian cancer No   Any relative bilateral breast cancer No   Any male have breast cancer No   Any ONE woman have BOTH breast AND ovarian cancer No   Any woman with breast cancer before 50yrs No   2 or more relatives with breast AND/OR ovarian cancer No   2 or more relatives with breast AND/OR bowel cancer No        Mammogram Screening - Mammogram every 1-2 years updated in Health Maintenance based on mutual decision making      History of abnormal Pap smear: Status post hysterectomy with removal of cervix and no history of CIN2 or greater or cervical cancer. Health Maintenance and Surgical History updated.        Latest Ref Rng & Units 11/1/2019     1:34 PM 11/1/2019    12:57 PM 5/22/2018     2:38 PM   PAP / HPV   PAP (Historical)   NIL     HPV 16 DNA NEG^Negative Negative   Negative    HPV 18 DNA NEG^Negative Negative   Negative    Other HR HPV NEG^Negative Negative   Negative      ASCVD Risk   The 10-year ASCVD risk score (Efren SIERRA, et al., 2019) is: 19.2%    Values used to calculate the score:      Age: 65 years      Sex: Female      Is Non- : Yes      Diabetic: Yes      Tobacco smoker: No      Systolic Blood Pressure: 141 mmHg      Is BP treated: Yes      HDL Cholesterol: 43 mg/dL      Total Cholesterol: 140 mg/dL            Reviewed and updated as needed this visit by Provider                    Patient Active Problem List   Diagnosis    Osteopenia    Vitamin B12 deficiency without anemia    Hyperlipidemia LDL goal <100    Rotator cuff syndrome    Type 2 diabetes mellitus with mild nonproliferative retinopathy (H)    Illiterate    Irritable bowel syndrome    Takotsubo cardiomyopathy    CAD (coronary artery disease)    Restless legs syndrome  (RLS)    CINDY (obstructive sleep apnea)- mild AHI 10.3    Verbal auditory hallucination    Chronic low back pain    Schizoaffective disorder, depressive type (H)    Migraine headache    Essential hypertension with goal blood pressure less than 130/80    Lumbago    Cervicalgia    Esophageal reflux    Nonproliferative diabetic retinopathy associated with diabetes mellitus due to underlying condition (H)    Tardive dyskinesia    Left cataract    Falls frequently    History of uterine cancer    Psychophysiological insomnia    Dysuria    Infectious encephalopathy    Non-intractable vomiting with nausea    Thoughts of self harm    Gastroenteritis    Posttraumatic stress disorder    Type 2 diabetes mellitus with stage 3a chronic kidney disease, with long-term current use of insulin (H)    Positive AIDA (antinuclear antibody)    Hyperglycemia    Depression with suicidal ideation    Mixed stress and urge urinary incontinence    Chronic pain syndrome    Fibromyalgia    Dehydration    Hypoglycemia    Diarrhea, unspecified type    Has poorly balanced diet    History of substance abuse (H)    History of suicidal behavior    Localized edema    Fatigue, unspecified type    Vitamin D deficiency    DDD (degenerative disc disease), lumbosacral    Impingement syndrome of shoulder region, right    Schizoaffective disorder, unspecified type (H)    Aspiration pneumonia due to vomit (H)    Glaucoma suspect of both eyes    Invasive ductal carcinoma of breast, female, right (H)    Breast wound, right, sequela    Long term (current) use of aromatase inhibitors    Type 2 diabetes mellitus with both eyes affected by severe nonproliferative retinopathy and macular edema, with long-term current use of insulin (H)     Past Surgical History:   Procedure Laterality Date    BIOPSY NODE SENTINEL Right 1/27/2025    Procedure: radiofrequency localized right axillary lymph node excision, right axillary sentinel lymph node biopsy;  Surgeon: Albino Giron  MD Kaushik;  Location:  OR    CATARACT IOL, RT/LT Bilateral 2017    CHOLECYSTECTOMY      COLONOSCOPY N/A 03/16/2017    Procedure: COLONOSCOPY;  Surgeon: Traci Gonzalez MD;  Location:  GI    Coronary CTA  05/21/2014    HYSTERECTOMY  1983    uterine cancer yearly pap's per provider.    LAPAROSCOPIC CHOLECYSTECTOMY  2008    LUMPECTOMY, BREAST, LOCALIZED USING RADIOFREQUENCY IDENTIFICATION Right 1/27/2025    Procedure: Radiofrequency localized right partial mastectomy;  Surgeon: Albino Giron MD;  Location:  OR    PHACOEMULSIFICATION CLEAR CORNEA WITH STANDARD INTRAOCULAR LENS IMPLANT Left 05/05/2017    Procedure: PHACOEMULSIFICATION CLEAR CORNEA WITH STANDARD INTRAOCULAR LENS IMPLANT;  LEFT EYE PHACOEMULSIFICATION CLEAR CORNEA WITH STANDARD INTRAOCULAR LENS IMPLANT ;  Surgeon: Tyra Diaz MD;  Location:  EC    PHACOEMULSIFICATION CLEAR CORNEA WITH STANDARD INTRAOCULAR LENS IMPLANT Right 06/30/2017    Procedure: PHACOEMULSIFICATION CLEAR CORNEA WITH STANDARD INTRAOCULAR LENS IMPLANT;  RIGHT EYE PHACOEMULSIFICATION CLEAR CORNEA WITH STANDARD INTRAOCULAR LENS IMPLANT;  Surgeon: Tyra Diaz MD;  Location:  EC    RELEASE TRIGGER FINGER  10/11/2012    Left thumb. Procedure: RELEASE TRIGGER FINGER;  LEFT THUMB TRIGGER RELEASE;  Surgeon: Tay Langley MD;  Location:  SD    RELEASE TRIGGER FINGER Right 12/26/2016    Procedure: RELEASE TRIGGER FINGER;  Surgeon: Albino Castañeda MD;  Location:  OR    Z OOPHORECTORMY FOR MALIG, W/BX  1983    UTERINE       Social History     Tobacco Use    Smoking status: Never    Smokeless tobacco: Never   Substance Use Topics    Alcohol use: Not Currently     Comment: when younger     Family History   Problem Relation Age of Onset    Cancer Mother         BLADDER    Respiratory Mother         COPD    Gastrointestinal Disease Mother         CIRRHOSIS OF LI BOLIVAR    Alcohol/Drug Mother     Diabetes Mother     Hypertension Mother     Lipids  Mother     SAM. Mother     Glaucoma Mother     Alcohol/Drug Sister     Mental Illness Sister     Alcohol/Drug Sister     Psychotic Disorder Sister     Cancer Maternal Grandmother         UNKNOWN TYPE    Cancer Brother         COLON    Cancer - colorectal Brother         IN HIS LATE 30S    Alcohol/Drug Brother          OF HEROIN OVERDOSE AT AGE 22 YRS    Macular Degeneration No family hx of          Current Outpatient Medications   Medication Sig Dispense Refill    acetaminophen (TYLENOL) 500 MG tablet Take 2 tablets (1,000 mg) by mouth 3 times daily. 168 tablet 4    Alcohol Swabs (EASY TOUCH ALCOHOL PREP MEDIUM) 70 % PADS APPLY 1 UNITS TOPICALLY 8 TIMES DAILY 200 each 3    amantadine (SYMMETREL) 100 MG capsule TAKE 1 CAPSULE BY MOUTH DAILY 28 capsule 11    amLODIPine (NORVASC) 10 MG tablet Take 1 tablet (10 mg) by mouth every morning. 30 tablet 11    anastrozole (ARIMIDEX) 1 MG tablet Take 1 tablet (1 mg) by mouth daily. 90 tablet 3    aspirin (ASPIRIN LOW DOSE) 81 MG EC tablet Take 1 tablet (81 mg) by mouth daily. 28 tablet 11    atorvastatin (LIPITOR) 80 MG tablet TAKE 1 TABLET BY MOUTH DAILY 28 tablet 5    blood glucose (ONETOUCH ULTRA) test strip USE TO TEST BLOOD SUGAR 10 TIMES DAILY OR AS DIRECTED. 300 strip 11    buPROPion (WELLBUTRIN XL) 150 MG 24 hr tablet Take 1 tablet (150 mg) by mouth every morning. 30 tablet 11    chlorhexidine (PERIDEX) 0.12 % solution Swish and spit 10 mLs in mouth 2 times daily 1893 mL 3    clonazePAM (KLONOPIN) 0.5 MG tablet TAKE 1 TABLET BY MOUTH AT BEDTIME 28 tablet 5    Continuous Glucose Sensor (DEXCOM G6 SENSOR) MISC Change every 10 days. 9 each 4    Continuous Glucose Transmitter (DEXCOM G6 TRANSMITTER) MISC Change every 3 months. 1 each 4    diclofenac (VOLTAREN) 1 % topical gel APPLY 4 GRAMS TO PAINFUL AREA AT BEDTIME . MAY USE AN ADDITIONAL 3 DOSES DURING 100 g 1    FIBER- MG tablet TAKE 2 TABLETS BY MOUTH TWICE A WEEK . TAKE WEDNESDAY WITH OZEMPIC AND THURSDAY  DAY AFTER OZEMPIC. DISCONTINUE IF DIARRHEA 16 tablet 2    gabapentin (NEURONTIN) 300 MG capsule TAKE 1 CAPSULE BY MOUTH AT BEDTIME 28 capsule 11    HUMALOG KWIKPEN 100 UNIT/ML soln Inject 0-12 Units subcutaneously 4 times daily (with meals and nightly) Before meals: BG 81 - 150:  0 units 151 - 200: 2 units, 201 - 250: 4 units, 251-300: 6 units, 301 - 350: 8 units, 351 or greater : 10 units At BEDTIME-If >4 hours since last Humalog injection For  - 249 give 1 units, 250 - 299 give 2 units,300 - 349 give 3 units, = or > 350 give 4 units. 15 mL 1    HYDROcodone-acetaminophen (NORCO) 5-325 MG tablet Take 1 tablet by mouth every 6 hours as needed for severe pain. 10 tablet 0    hydrOXYzine (VISTARIL) 25 MG capsule TAKE 1 CAPSULE BY MOUTH EVERY NIGHT AT BEDTIME ANXIETY/SLEEP 28 capsule 0    ibuprofen (ADVIL/MOTRIN) 200 MG tablet Take 200 mg by mouth every 4 hours as needed for pain      insulin glargine (LANTUS SOLOSTAR) 100 UNIT/ML pen Inject 22 Units subcutaneously daily. 45 mL 1    insulin pen needle (BD AUTOSHIELD DUO) 30G X 5 MM USE 6 DAILY OR AS DIRECTED 200 each 10    Lancets (ONETOUCH DELICA PLUS [id number]) MISC 1 EACH AS NEEDED  USE TO TEST BLOOD SUGARS 1-2 TIMES DAILY AS DIRECTED 100 each 3    Lidocaine (LIDOCAINE PAIN RELIEF) 4 % Patch APPLY 1 PATCH ONTO SKIN EVERY 24 HOURS as needed; APPLY AT NIGHT AND REMOVE IN THE MORNING ( TWELVE HOURS PATCH FREE). 30 patch 11    loperamide (IMODIUM) 2 MG capsule Take 1 capsule (2 mg) by mouth 4 times daily as needed for diarrhea. 30 capsule 11    losartan-hydrochlorothiazide (HYZAAR) 100-12.5 MG tablet Take 1 tablet by mouth daily. 30 tablet 11    Magnesium Oxide 250 MG TABS Take 1 tablet (250 mg) by mouth at bedtime. 28 tablet 11    melatonin 5 MG tablet Take 5 mg by mouth at bedtime      metoprolol succinate ER (TOPROL XL) 50 MG 24 hr tablet TAKE 1 TABLET BY MOUTH IN THE MORNING AND TAKE 2 TABLETS AT BEDTIME 84 tablet 11    mirabegron (MYRBETRIQ) 50 MG 24 hr tablet  Take 1 tablet (50 mg) by mouth daily. 90 tablet 5    mupirocin (BACTROBAN) 2 % external ointment Apply topically 2 times daily. To small rash at top of buttocks until clear 15 g 0    naproxen (NAPROSYN) 375 MG tablet Take 1 tablet (375 mg) by mouth 2 times daily as needed for moderate pain (denatl pain) 60 tablet 11    NYAMYC 031162 UNIT/GM external powder APPLY TOPICALLY TWICE A DAY AS NEEDED 60 g 0    ondansetron (ZOFRAN) 4 MG tablet TAKE 1 TABLET BY MOUTH EVERY 8 HOURS AS NEEDED FOR NAUSEA 10 tablet 1    order for DME Equipment being ordered: Depends. 30 each 4    order for DME Equipment being ordered: CPAP Supplies. 1 each 0    paliperidone ER (INVEGA) 6 MG 24 hr tablet TAKE 2 TABLETS BY MOUTH EVERY NIGHT AT BEDTIME 60 tablet 11    pantoprazole (PROTONIX) 40 MG EC tablet Take 1 tablet (40 mg) by mouth daily. 28 tablet 11    prazosin (MINIPRESS) 1 MG capsule Take 1 mg by mouth at bedtime      QUEtiapine (SEROQUEL) 100 MG tablet Take 100 mg by mouth at bedtime      senna-docusate (SENOKOT S) 8.6-50 MG tablet Take 1 tablet by mouth daily as needed for constipation. 30 tablet 11    senna-docusate (SENOKOT-S/PERICOLACE) 8.6-50 MG tablet Take 1 tablet by mouth 2 times daily as needed for constipation (While on oral opioids.). 10 tablet 0    sertraline (ZOLOFT) 100 MG tablet Take 100 mg by mouth daily      Tirzepatide 7.5 MG/0.5ML SOAJ Inject 0.5 mLs (7.5 mg) subcutaneously every 7 days. 2 mL 11    vitamin C (ASCORBIC ACID) 500 MG tablet TAKE 2 TABLETS BY MOUTH IN THE MORNING AND TAKE 2 TABLETS BY MOUTH IN THE EVENING 112 tablet 8    vitamin E (TOCOPHEROL) 400 units (180 mg) capsule Take 1 capsule (400 Units) by mouth daily. 30 capsule 11    albuterol (PROAIR HFA/PROVENTIL HFA/VENTOLIN HFA) 108 (90 Base) MCG/ACT inhaler Inhale 2 puffs into the lungs every 6 hours as needed for shortness of breath, wheezing or cough 18 g 3    calcium carbonate (OS-ALLEN) 1500 (600 Ca) MG tablet TAKE 1 TABLET BY MOUTH DAILY 28 tablet 5     Cholecalciferol (D3 HIGH POTENCY) 25 MCG (1000 UT) CAPS TAKE 2 CAPSULES BY MOUTH DAILY 180 capsule 2    leflunomide (ARAVA) 20 MG tablet TAKE 1 TABLET BY MOUTH DAILY *LABS EVERY 8-12 WEEKS 30 tablet 1    oxyCODONE (ROXICODONE) 5 MG tablet Take 1 tablet (5 mg) by mouth every 6 hours as needed for moderate to severe pain. 8 tablet 0    zinc oxide (DESITIN) 20 % external ointment Apply topically 3 times daily as needed for irritation (barrier cream) 60 g 3     Allergies   Allergen Reactions    Imidazole Antifungals Hives     Tolerates diflucan    Ketoprofen Itching     Pruritis to topical    Lisinopril Hives    Metformin Other (See Comments)     Patient hospitalized for lactic acidosis - admitting provider suspectd caused by metformin    Metronidazole Hives    Penicillins     Posaconazole Hives     Tolerates diflucan     Current providers sharing in care for this patient include:  Patient Care Team:  Albino Rainey MD as PCP - General (Family Medicine)  Regina Heredia as   Liliana Miller PsyD as Psych Associate  Pavel Moore as County Worker  Tiburcio Chacon MD as MD (Ophthalmology)  Debbie Germain PA-C as Physician Assistant (Physician Assistant)  Eugenia De Jesus RPH as Pharmacist (Pharmacist)  [facility] ([facility]ENC[facility] ([facility] ([facility]  Gisselle Kirby PA-C as Assigned OBGYN Provider  Eugenia De Jesus RPH as Assigned MTM Pharmacist  Aniya Mcgregor LICSW as  ( - Clinical)  Albino Rainey MD as Assigned PCP  Jeny Weir as CADI worker  Amal RN with pt group [facility] as   Chanell Duque PA-C as Physician Assistant (Endocrinology, Diabetes, and Metabolism)  Chanell Duque PA-C as Assigned Endocrinology Provider  Alexus Posey MD as Assigned Rheumatology Provider  Xiang Baca MD as Assigned Cancer Care Provider  Albino Giron MD  "as Assigned Surgical Provider    The following health maintenance items are reviewed in Epic and correct as of today:  Health Maintenance   Topic Date Due    RSV VACCINE (1 - Risk 60-74 years 1-dose series) Never done    DIABETIC FOOT EXAM  12/12/2024    MEDICARE ANNUAL WELLNESS VISIT  03/12/2025    COVID-19 Vaccine (6 - Moderna risk 2024-25 season) 05/05/2025    A1C  06/11/2025    CMP  06/17/2025    LIPID  09/03/2025    EYE EXAM  10/14/2025    PHQ-9  10/29/2025    MICROALBUMIN  11/26/2025    MAMMO SCREENING  12/20/2025    ANNUAL REVIEW OF HM ORDERS  01/21/2026    BMP  03/11/2026    HEMOGLOBIN  03/11/2026    DEXA  02/10/2027    COLORECTAL CANCER SCREENING  03/16/2027    ADVANCE CARE PLANNING  01/21/2030    DTAP/TDAP/TD IMMUNIZATION (4 - Td or Tdap) 09/15/2030    HEPATITIS C SCREENING  Completed    HIV SCREENING  Completed    DEPRESSION ACTION PLAN  Completed    MIGRAINE ACTION PLAN  Completed    INFLUENZA VACCINE  Completed    Pneumococcal Vaccine: 50+ Years  Completed    URINALYSIS  Completed    ZOSTER IMMUNIZATION  Completed    Medicare Annual MTM Pharmacist Visit (once per calendar year)  Completed    HPV IMMUNIZATION  Aged Out    MENINGITIS IMMUNIZATION  Aged Out    HPV TEST  Discontinued    PAP  Discontinued         Review of Systems  Constitutional, HEENT, cardiovascular, pulmonary, GI, , musculoskeletal, neuro, skin, endocrine and psych systems are negative, except as otherwise noted.     Objective    Exam  BP (!) 141/82 (BP Location: Left arm, Patient Position: Sitting, Cuff Size: Adult Large)   Pulse 67   Temp (!) 96.3  F (35.7  C) (Temporal)   Wt 92.5 kg (204 lb)   LMP 01/06/2015 (Exact Date)   SpO2 99%   BMI 38.52 kg/m     Estimated body mass index is 38.52 kg/m  as calculated from the following:    Height as of 3/11/25: 1.55 m (5' 1.02\").    Weight as of this encounter: 92.5 kg (204 lb).    Physical Exam  GENERAL: alert and no distress  EYES: Eyes grossly normal to inspection, PERRL and " conjunctivae and sclerae normal  HENT: ear canals and TM's normal, nose and mouth without ulcers or lesions  NECK: no adenopathy, no asymmetry, masses, or scars  RESP: lungs clear to auscultation - no rales, rhonchi or wheezes  CV: regular rate and rhythm, normal S1 S2, no S3 or S4, no murmur, click or rub, no peripheral edema  ABDOMEN: soft, nontender, no hepatosplenomegaly, no masses and bowel sounds normal  MS: no gross musculoskeletal defects noted, no edema  SKIN: no suspicious lesions or rashes  NEURO: Normal strength and tone, mentation intact and speech normal  PSYCH: mentation appears normal, affect normal/bright  Diabetic foot exam: normal DP and PT pulses, no trophic changes or ulcerative lesions, and normal sensory exam        4/29/2025   Mini Cog   Clock Draw Score 2 Normal   3 Item Recall 1 object recalled   Mini Cog Total Score 3              Signed Electronically by: Albino Rainey MD

## 2025-04-29 NOTE — PATIENT INSTRUCTIONS
Patient Education   Preventive Care Advice   This is general advice given by our system to help you stay healthy. However, your care team may have specific advice just for you. Please talk to your care team about your preventive care needs.  Nutrition  Eat 5 or more servings of fruits and vegetables each day.  Try wheat bread, brown rice and whole grain pasta (instead of white bread, rice, and pasta).  Get enough calcium and vitamin D. Check the label on foods and aim for 100% of the RDA (recommended daily allowance).  Lifestyle  Exercise at least 150 minutes each week  (30 minutes a day, 5 days a week).  Do muscle strengthening activities 2 days a week. These help control your weight and prevent disease.  No smoking.  Wear sunscreen to prevent skin cancer.  Have a dental exam and cleaning every 6 months.  Yearly exams  See your health care team every year to talk about:  Any changes in your health.  Any medicines your care team has prescribed.  Preventive care, family planning, and ways to prevent chronic diseases.  Shots (vaccines)   HPV shots (up to age 26), if you've never had them before.  Hepatitis B shots (up to age 59), if you've never had them before.  COVID-19 shot: Get this shot when it's due.  Flu shot: Get a flu shot every year.  Tetanus shot: Get a tetanus shot every 10 years.  Pneumococcal, hepatitis A, and RSV shots: Ask your care team if you need these based on your risk.  Shingles shot (for age 50 and up)  General health tests  Diabetes screening:  Starting at age 35, Get screened for diabetes at least every 3 years.  If you are younger than age 35, ask your care team if you should be screened for diabetes.  Cholesterol test: At age 39, start having a cholesterol test every 5 years, or more often if advised.  Bone density scan (DEXA): At age 50, ask your care team if you should have this scan for osteoporosis (brittle bones).  Hepatitis C: Get tested at least once in your life.  STIs (sexually  transmitted infections)  Before age 24: Ask your care team if you should be screened for STIs.  After age 24: Get screened for STIs if you're at risk. You are at risk for STIs (including HIV) if:  You are sexually active with more than one person.  You don't use condoms every time.  You or a partner was diagnosed with a sexually transmitted infection.  If you are at risk for HIV, ask about PrEP medicine to prevent HIV.  Get tested for HIV at least once in your life, whether you are at risk for HIV or not.  Cancer screening tests  Cervical cancer screening: If you have a cervix, begin getting regular cervical cancer screening tests starting at age 21.  Breast cancer scan (mammogram): If you've ever had breasts, begin having regular mammograms starting at age 40. This is a scan to check for breast cancer.  Colon cancer screening: It is important to start screening for colon cancer at age 45.  Have a colonoscopy test every 10 years (or more often if you're at risk) Or, ask your provider about stool tests like a FIT test every year or Cologuard test every 3 years.  To learn more about your testing options, visit:   .  For help making a decision, visit:   https://bit.ly/ju26311.  Prostate cancer screening test: If you have a prostate, ask your care team if a prostate cancer screening test (PSA) at age 55 is right for you.  Lung cancer screening: If you are a current or former smoker ages 50 to 80, ask your care team if ongoing lung cancer screenings are right for you.  For informational purposes only. Not to replace the advice of your health care provider. Copyright   2023 [employer]. All rights reserved. Clinically reviewed by the [facility]itions Program. [place] 621672 - REV 01/24.

## 2025-05-07 ENCOUNTER — MEDICAL CORRESPONDENCE (OUTPATIENT)
Dept: HEALTH INFORMATION MANAGEMENT | Facility: CLINIC | Age: 65
End: 2025-05-07
Payer: COMMERCIAL

## 2025-05-08 NOTE — PROGRESS NOTES
"      Pt comes via EMS from \"the parking [facility] of a [facility]\". Initially found \"unconscious\"  but was awake but would not respond with words to medics. Slowly started talking and by the time she got here wanted to leave. Ripped out Iv. Ripped off BP cuff. Refuses to answer Suicide questions. Pt yelling and threatening to \"beat up\"staff. Pt moved to room #7.Family said that pt did not fall in the parking lot, she sat down and then laid down. No fall  " "[facility], [place]   Psychiatric Progress Note        Interim History:   From H&P: Nena Tang is a 59 year old female with history of schizoaffective disorder, polysubstance abuse, type 2 diabetes, sleep apnea, and borderline intellectual functioning.  The patient went to the emergency room for evaluation of increased depression, and suicidal ideation.  The patient lives in a group home.  Reported having increasing thoughts of cutting or overdosing on insulin as a suicide attempt.  The patient reported that for several days, she has been taking extra insulin \"as a practice attempt\".  She reported having auditory hallucinations of seeing a man in in [place] and red eyes telling her to kill herself if she uses her CPAP machines.  She stopped using the CPAP.  She reported that the holidays are difficult for her as she lost multiple family members around this time of the year.    The patient's care was discussed with the treatment team during the daily team meeting and/or staff's chart notes were reviewed.  Staff report patient has been calm, pleasant, cooperative. Has been sleeping on and off during the day and evening. Denies SI. Visible in the milieu but keeping to herself.  Patient is eating well and taking medications as prescribed. Slept 8 hours.      Met with patient. Main concern is poor appetite and epigastric pain. States that she worries about not having a place to stay that causes stomach problems. Other than that, mood is good. Looks tired. Discontinue the day dose of Gabapentin. Discussed disposition. The patient would like to stay in [facility]. Worries that if she goes somewhere else, her kids and grand kids will not be able to come visit her.          Medications:       amantadine  100 mg Oral BID     aspirin  81 mg Oral Daily     atorvastatin  80 mg Oral Daily     clonazePAM  0.5 mg Oral At Bedtime     famotidine  40 mg Oral At Bedtime     gabapentin  300 mg Oral At " Bedtime     insulin aspart  1-10 Units Subcutaneous TID AC     insulin aspart  1-7 Units Subcutaneous At Bedtime     insulin glargine  60 Units Subcutaneous At Bedtime     losartan  50 mg Oral Daily     melatonin  10 mg Oral At Bedtime     metoprolol succinate ER  25 mg Oral Daily     nystatin   Topical BID     paliperidone ER  9 mg Oral At Bedtime     sertraline  200 mg Oral Daily     topiramate  50 mg Oral At Bedtime          Allergies:     Allergies   Allergen Reactions     Imidazole Antifungals Hives     Tolerates diflucan     Ketoprofen Itching     Pruritis to topical     Lisinopril Hives     Metformin Other (See Comments)     Patient hospitalized for lactic acidosis - admitting provider suspectd caused by metformin     Metronidazole Hives     Posaconazole Hives     Tolerates diflucan          Labs:     Recent Results (from the past 672 hour(s))   Hemoglobin A1c    Collection Time: 12/03/19 11:43 AM   Result Value Ref Range    Hemoglobin A1C 6.2 (H) 0 - 5.6 %   Comprehensive metabolic panel (BMP + Alb, Alk Phos, ALT, AST, Total. Bili, TP)    Collection Time: 12/03/19 11:43 AM   Result Value Ref Range    Sodium 139 133 - 144 mmol/L    Potassium 4.1 3.4 - 5.3 mmol/L    Chloride 110 (H) 94 - 109 mmol/L    Carbon Dioxide 21 20 - 32 mmol/L    Anion Gap 8 3 - 14 mmol/L    Glucose 146 (H) 70 - 99 mg/dL    Urea Nitrogen 16 7 - 30 mg/dL    Creatinine 0.91 0.52 - 1.04 mg/dL    GFR Estimate 69 >60 mL/min/[1.73_m2]    GFR Estimate If Black 80 >60 mL/min/[1.73_m2]    Calcium 8.9 8.5 - 10.1 mg/dL    Bilirubin Total 0.2 0.2 - 1.3 mg/dL    Albumin 3.4 3.4 - 5.0 g/dL    Protein Total 7.7 6.8 - 8.8 g/dL    Alkaline Phosphatase 90 40 - 150 U/L    ALT 26 0 - 50 U/L    AST 15 0 - 45 U/L   Drug Abuse Screen Panel 13, Urine (Pain Care Package)    Collection Time: 12/03/19 12:11 PM   Result Value Ref Range    Cannabinoids (78-[id number]-9-THC) Not Detected NDET^Not Detected ng/mL    Phencyclidine (Phencyclidine) Not Detected  NDET^Not Detected ng/mL    Cocaine (Benzoylecgonine) Not Detected NDET^Not Detected ng/mL    Methamphetamine (d-Methamphetamine) Not Detected NDET^Not Detected ng/mL    Opiates (Morphine) Not Detected NDET^Not Detected ng/mL    Amphetamine (d-Amphetamine) Not Detected NDET^Not Detected ng/mL    Benzodiazepines (Nordiazepam) Not Detected NDET^Not Detected ng/mL    Tricyclic Antidepressants (Desipramine) Not Detected NDET^Not Detected ng/mL    Methadone (Methadone) Not Detected NDET^Not Detected ng/mL    Barbiturates (Butalbital) Not Detected NDET^Not Detected ng/mL    Oxycodone (Oxycodone) Not Detected NDET^Not Detected ng/mL    Propoxyphene (Norpropoxyphene) Not Detected NDET^Not Detected ng/mL    Buprenorphine (Buprenorphine) Not Detected NDET^Not Detected ng/mL   Glucose by meter    Collection Time: 12/20/19  4:04 PM   Result Value Ref Range    Glucose 219 (H) 70 - 99 mg/dL   Drug abuse screen 6 urine (chem dep)    Collection Time: 12/20/19  4:19 PM   Result Value Ref Range    Amphetamine Qual Urine Negative NEG^Negative    Barbiturates Qual Urine Negative NEG^Negative    Benzodiazepine Qual Urine Negative NEG^Negative    Cannabinoids Qual Urine Negative NEG^Negative    Cocaine Qual Urine Negative NEG^Negative    Ethanol Qual Urine Negative NEG^Negative    Opiates Qualitative Urine Negative NEG^Negative   Glucose by meter    Collection Time: 12/20/19  8:56 PM   Result Value Ref Range    Glucose 110 (H) 70 - 99 mg/dL   Glucose by meter    Collection Time: 12/20/19 11:13 PM   Result Value Ref Range    Glucose 190 (H) 70 - 99 mg/dL   Glucose by meter    Collection Time: 12/21/19  2:05 AM   Result Value Ref Range    Glucose 177 (H) 70 - 99 mg/dL   Glucose by meter    Collection Time: 12/21/19  7:45 AM   Result Value Ref Range    Glucose 148 (H) 70 - 99 mg/dL   Glucose by meter    Collection Time: 12/21/19 11:09 AM   Result Value Ref Range    Glucose 134 (H) 70 - 99 mg/dL   Glucose by meter    Collection Time: 12/21/19  12:26 PM   Result Value Ref Range    Glucose 123 (H) 70 - 99 mg/dL   Glucose by meter    Collection Time: 12/21/19  5:13 PM   Result Value Ref Range    Glucose 130 (H) 70 - 99 mg/dL   Glucose by meter    Collection Time: 12/21/19  9:57 PM   Result Value Ref Range    Glucose 158 (H) 70 - 99 mg/dL   Glucose by meter    Collection Time: 12/22/19  1:47 AM   Result Value Ref Range    Glucose 110 (H) 70 - 99 mg/dL   CBC with platelets differential    Collection Time: 12/22/19  6:51 AM   Result Value Ref Range    WBC 7.5 4.0 - 11.0 10e9/L    RBC Count 3.57 (L) 3.8 - 5.2 10e12/L    Hemoglobin 10.8 (L) 11.7 - 15.7 g/dL    Hematocrit 34.9 (L) 35.0 - 47.0 %    MCV 98 78 - 100 fl    MCH 30.3 26.5 - 33.0 pg    MCHC 30.9 (L) 31.5 - 36.5 g/dL    RDW 13.6 10.0 - 15.0 %    Platelet Count 221 150 - 450 10e9/L    Diff Method Automated Method     % Neutrophils 49.9 %    % Lymphocytes 40.2 %    % Monocytes 8.0 %    % Eosinophils 1.2 %    % Basophils 0.3 %    % Immature Granulocytes 0.4 %    Nucleated RBCs 0 0 /100    Absolute Neutrophil 3.8 1.6 - 8.3 10e9/L    Absolute Lymphocytes 3.0 0.8 - 5.3 10e9/L    Absolute Monocytes 0.6 0.0 - 1.3 10e9/L    Absolute Eosinophils 0.1 0.0 - 0.7 10e9/L    Absolute Basophils 0.0 0.0 - 0.2 10e9/L    Abs Immature Granulocytes 0.0 0 - 0.4 10e9/L    Absolute Nucleated RBC 0.0    Comprehensive metabolic panel    Collection Time: 12/22/19  6:51 AM   Result Value Ref Range    Sodium 141 133 - 144 mmol/L    Potassium 4.1 3.4 - 5.3 mmol/L    Chloride 111 (H) 94 - 109 mmol/L    Carbon Dioxide 22 20 - 32 mmol/L    Anion Gap 8 3 - 14 mmol/L    Glucose 111 (H) 70 - 99 mg/dL    Urea Nitrogen 16 7 - 30 mg/dL    Creatinine 0.86 0.52 - 1.04 mg/dL    GFR Estimate 74 >60 mL/min/[1.73_m2]    GFR Estimate If Black 85 >60 mL/min/[1.73_m2]    Calcium 8.7 8.5 - 10.1 mg/dL    Bilirubin Total 0.2 0.2 - 1.3 mg/dL    Albumin 3.2 (L) 3.4 - 5.0 g/dL    Protein Total 7.5 6.8 - 8.8 g/dL    Alkaline Phosphatase 88 40 - 150 U/L    ALT  22 0 - 50 U/L    AST 8 0 - 45 U/L   Lipid panel    Collection Time: 12/22/19  6:51 AM   Result Value Ref Range    Cholesterol 154 <200 mg/dL    Triglycerides 132 <150 mg/dL    HDL Cholesterol 42 (L) >49 mg/dL    LDL Cholesterol Calculated 86 <100 mg/dL    Non HDL Cholesterol 112 <130 mg/dL   TSH with free T4 reflex and/or T3 as indicated    Collection Time: 12/22/19  6:51 AM   Result Value Ref Range    TSH 1.23 0.40 - 4.00 mU/L   Folate    Collection Time: 12/22/19  6:51 AM   Result Value Ref Range    Folate 8.5 >5.4 ng/mL   Vitamin B12    Collection Time: 12/22/19  6:51 AM   Result Value Ref Range    Vitamin B12 358 193 - 986 pg/mL   Vitamin D    Collection Time: 12/22/19  6:51 AM   Result Value Ref Range    Vitamin D Deficiency screening 39 20 - 75 ug/L   Bilirubin direct    Collection Time: 12/22/19  6:51 AM   Result Value Ref Range    Bilirubin Direct <0.1 0.0 - 0.2 mg/dL   Glucose by meter    Collection Time: 12/22/19 12:17 PM   Result Value Ref Range    Glucose 238 (H) 70 - 99 mg/dL   UA with Microscopic reflex to Culture    Collection Time: 12/22/19  1:15 PM   Result Value Ref Range    Color Urine Yellow     Appearance Urine Clear     Glucose Urine 300 (A) NEG^Negative mg/dL    Bilirubin Urine Negative NEG^Negative    Ketones Urine Negative NEG^Negative mg/dL    Specific Gravity Urine 1.018 1.003 - 1.035    Blood Urine Negative NEG^Negative    pH Urine 6.0 5.0 - 7.0 pH    Protein Albumin Urine Negative NEG^Negative mg/dL    Urobilinogen mg/dL Normal 0.0 - 2.0 mg/dL    Nitrite Urine Negative NEG^Negative    Leukocyte Esterase Urine Negative NEG^Negative    Source Midstream Urine     WBC Urine <1 0 - 5 /HPF    RBC Urine 0 0 - 2 /HPF    Bacteria Urine Few (A) NEG^Negative /HPF    Squamous Epithelial /HPF Urine 1 0 - 1 /HPF   Glucose by meter    Collection Time: 12/22/19  4:32 PM   Result Value Ref Range    Glucose 224 (H) 70 - 99 mg/dL   Glucose by meter    Collection Time: 12/22/19  9:07 PM   Result Value Ref  Range    Glucose 204 (H) 70 - 99 mg/dL   Glucose by meter    Collection Time: 12/23/19  2:01 AM   Result Value Ref Range    Glucose 197 (H) 70 - 99 mg/dL   Glucose by meter    Collection Time: 12/23/19  7:57 AM   Result Value Ref Range    Glucose 132 (H) 70 - 99 mg/dL   Glucose by meter    Collection Time: 12/23/19 11:40 AM   Result Value Ref Range    Glucose 194 (H) 70 - 99 mg/dL   Glucose by meter    Collection Time: 12/23/19  4:43 PM   Result Value Ref Range    Glucose 151 (H) 70 - 99 mg/dL   Glucose by meter    Collection Time: 12/23/19  8:04 PM   Result Value Ref Range    Glucose 301 (H) 70 - 99 mg/dL   Glucose by meter    Collection Time: 12/23/19  9:12 PM   Result Value Ref Range    Glucose 286 (H) 70 - 99 mg/dL   Glucose by meter    Collection Time: 12/24/19  1:48 AM   Result Value Ref Range    Glucose 164 (H) 70 - 99 mg/dL   Glucose by meter    Collection Time: 12/24/19  8:39 AM   Result Value Ref Range    Glucose 101 (H) 70 - 99 mg/dL   Glucose by meter    Collection Time: 12/24/19 12:00 PM   Result Value Ref Range    Glucose 155 (H) 70 - 99 mg/dL   Glucose by meter    Collection Time: 12/24/19  5:11 PM   Result Value Ref Range    Glucose 144 (H) 70 - 99 mg/dL   Glucose by meter    Collection Time: 12/24/19  8:48 PM   Result Value Ref Range    Glucose 239 (H) 70 - 99 mg/dL   Glucose by meter    Collection Time: 12/25/19  2:05 AM   Result Value Ref Range    Glucose 147 (H) 70 - 99 mg/dL   Glucose by meter    Collection Time: 12/25/19  7:50 AM   Result Value Ref Range    Glucose 111 (H) 70 - 99 mg/dL   Glucose by meter    Collection Time: 12/25/19 12:14 PM   Result Value Ref Range    Glucose 199 (H) 70 - 99 mg/dL   Glucose by meter    Collection Time: 12/25/19  4:46 PM   Result Value Ref Range    Glucose 352 (H) 70 - 99 mg/dL   Glucose by meter    Collection Time: 12/25/19  9:04 PM   Result Value Ref Range    Glucose 338 (H) 70 - 99 mg/dL   Glucose by meter    Collection Time: 12/25/19  9:47 PM   Result  Value Ref Range    Glucose 355 (H) 70 - 99 mg/dL   Glucose by meter    Collection Time: 12/26/19  1:49 AM   Result Value Ref Range    Glucose 194 (H) 70 - 99 mg/dL   Glucose by meter    Collection Time: 12/26/19  8:01 AM   Result Value Ref Range    Glucose 139 (H) 70 - 99 mg/dL   Glucose by meter    Collection Time: 12/26/19 12:11 PM   Result Value Ref Range    Glucose 223 (H) 70 - 99 mg/dL   Glucose by meter    Collection Time: 12/26/19  5:02 PM   Result Value Ref Range    Glucose 206 (H) 70 - 99 mg/dL   Glucose by meter    Collection Time: 12/26/19  8:11 PM   Result Value Ref Range    Glucose 337 (H) 70 - 99 mg/dL   Glucose by meter    Collection Time: 12/27/19  1:30 AM   Result Value Ref Range    Glucose 229 (H) 70 - 99 mg/dL   Glucose by meter    Collection Time: 12/27/19  7:57 AM   Result Value Ref Range    Glucose 165 (H) 70 - 99 mg/dL            Psychiatric Examination:   Temp: 97.5  F (36.4  C) Temp src: Oral BP: 93/65 Pulse: 86   Resp: 16 SpO2: 95 % O2 Device: None (Room air)    Weight is 236 lbs 0 oz  Body mass index is 46.09 kg/m .    Appearance: well groomed, awake, alert, cooperative, no apparent distress and moderately obese  Attitude:  cooperative  Eye Contact:  good  Mood:  good  Affect:  appropriate and in normal range  Speech:  clear, coherent  Psychomotor Behavior:  no evidence of tardive dyskinesia, dystonia, or tics  Throught Process:  logical and goal oriented  Associations:  no loose associations  Thought Content:  no evidence of suicidal ideation or homicidal ideation, no auditory hallucinations present and no visual hallucinations present  Insight:  good  Judgement:  intact  Oriented to:  time, person, and place  Attention Span and Concentration:  intact  Recent and Remote Memory:  intact         Precautions:     Behavioral Orders   Procedures     Code 1 - Restrict to Unit     Fall precautions     Routine Programming     As clinically indicated     Self Injury Precaution     Status 15      "Every 15 minutes.     Suicide precautions     Patients on Suicide Precautions should have a Combination Diet ordered that includes a Diet selection(s) AND a Behavioral Tray selection for Safe Tray - with utensils, or Safe Tray - NO utensils            DIagnoses:   1.  Schizoaffective disorder, bipolar type, currently depressed, with psychosis  2.  Borderline intellectual functioning  3.  Polysubstance abuse  4.  Medical problems include morbid obesity, type 2 diabetes, sleep apnea, multiple pain issues.         Plan:   The patient is a very pleasant, -American female who was admitted with increased depression, auditory visual hallucinations, and suicidal thoughts with a plan to overdose on her insulin.  Reported having a \"suicide trials\" by injecting herself with more insulin than she needs.  Reports that her medications are administered by the staff however, she is managing her insulin.  The patient was not able to identify ago.  Reports that the auditory visual hallucinations have been going on for the last 3 years.  She has been having intrusive thoughts of suicide.  She feels safe on the unit.  The patient was not able to discuss her medications since she does not know what she is taking.  Medication changes will include the following:   --Discontinue Cogentin.    --Start amantadine 100 mg twice a day.   --Cancel the day time dose of Gabapentin due to sedation. Continue 300 mg, at bedtime. .    --Change Invega to 9 mg bedtime.    --Discontinue the morning dose of Topamax.    --Continue Topamax 50 mg at bedtime.   --Continue Klonopin 0.5 mg at bedtime.    --Continue melatonin 10 mg at bedtime.    --Discontinue naltrexone, the patient has been sober for 27 years.    --Continue Zoloft 200 mg every morning.    --Blood work was reviewed.    --Internal medicine follow-up for medical problems.    --The patient was consulted on nature of illness and treatment options.   --Care was coordinated with the treatment " team.     Disposition Plan   Reason for ongoing admission: is unable to care for self due to depression, SI, AH/VH.   Disposition:group home  Estimated length of stay: 5-7 days  Legal Status:  voluntary  Discharge will be granted once symptoms improved.    Dede CORDOVA CNP  Date: 12/27/19  Time: 11:28 AM

## 2025-05-13 DIAGNOSIS — M25.551 HIP PAIN, RIGHT: ICD-10-CM

## 2025-05-13 RX ORDER — PSEUDOEPHED/ACETAMINOPH/DIPHEN 30MG-500MG
1000 TABLET ORAL 3 TIMES DAILY
Qty: 168 TABLET | Refills: 4 | Status: SHIPPED | OUTPATIENT
Start: 2025-05-13

## 2025-05-13 NOTE — TELEPHONE ENCOUNTER
Prescription approved per [facility] Refill Protocol.  Arianna Randall RN  [facility]

## 2025-05-19 ENCOUNTER — MEDICAL CORRESPONDENCE (OUTPATIENT)
Dept: HEALTH INFORMATION MANAGEMENT | Facility: CLINIC | Age: 65
End: 2025-05-19
Payer: COMMERCIAL

## 2025-06-02 ENCOUNTER — TELEPHONE (OUTPATIENT)
Dept: FAMILY MEDICINE | Facility: CLINIC | Age: 65
End: 2025-06-02
Payer: COMMERCIAL

## 2025-06-02 NOTE — TELEPHONE ENCOUNTER
Facility calling to reschedule both appointments for tomorrow with Dr. Rainey and Eugenia. Please call them back at  [phone] to assist in changing the appointments. Thanks    Monique Peters RN  [facility]

## 2025-06-03 ENCOUNTER — OFFICE VISIT (OUTPATIENT)
Dept: FAMILY MEDICINE | Facility: CLINIC | Age: 65
End: 2025-06-03
Attending: FAMILY MEDICINE
Payer: COMMERCIAL

## 2025-06-03 ENCOUNTER — OFFICE VISIT (OUTPATIENT)
Dept: PHARMACY | Facility: CLINIC | Age: 65
End: 2025-06-03
Payer: COMMERCIAL

## 2025-06-03 VITALS
DIASTOLIC BLOOD PRESSURE: 60 MMHG | BODY MASS INDEX: 39.27 KG/M2 | RESPIRATION RATE: 16 BRPM | TEMPERATURE: 96.9 F | WEIGHT: 208 LBS | OXYGEN SATURATION: 97 % | SYSTOLIC BLOOD PRESSURE: 118 MMHG | HEART RATE: 70 BPM | HEIGHT: 61 IN

## 2025-06-03 DIAGNOSIS — E66.812 CLASS 2 SEVERE OBESITY DUE TO EXCESS CALORIES WITH SERIOUS COMORBIDITY AND BODY MASS INDEX (BMI) OF 39.0 TO 39.9 IN ADULT (H): ICD-10-CM

## 2025-06-03 DIAGNOSIS — C50.911 INVASIVE DUCTAL CARCINOMA OF BREAST, FEMALE, RIGHT (H): ICD-10-CM

## 2025-06-03 DIAGNOSIS — N18.30 TYPE 2 DIABETES MELLITUS WITH STAGE 3 CHRONIC KIDNEY DISEASE, WITH LONG-TERM CURRENT USE OF INSULIN, UNSPECIFIED WHETHER STAGE 3A OR 3B CKD (H): Primary | ICD-10-CM

## 2025-06-03 DIAGNOSIS — E11.3413 TYPE 2 DIABETES MELLITUS WITH BOTH EYES AFFECTED BY SEVERE NONPROLIFERATIVE RETINOPATHY AND MACULAR EDEMA, WITH LONG-TERM CURRENT USE OF INSULIN (H): Primary | ICD-10-CM

## 2025-06-03 DIAGNOSIS — R21 RASH AND NONSPECIFIC SKIN ERUPTION: ICD-10-CM

## 2025-06-03 DIAGNOSIS — E11.22 TYPE 2 DIABETES MELLITUS WITH STAGE 3A CHRONIC KIDNEY DISEASE, WITH LONG-TERM CURRENT USE OF INSULIN (H): ICD-10-CM

## 2025-06-03 DIAGNOSIS — Z79.4 TYPE 2 DIABETES MELLITUS WITH STAGE 3A CHRONIC KIDNEY DISEASE, WITH LONG-TERM CURRENT USE OF INSULIN (H): ICD-10-CM

## 2025-06-03 DIAGNOSIS — D64.9 ANEMIA, UNSPECIFIED TYPE: ICD-10-CM

## 2025-06-03 DIAGNOSIS — Z79.4 TYPE 2 DIABETES MELLITUS WITH BOTH EYES AFFECTED BY SEVERE NONPROLIFERATIVE RETINOPATHY AND MACULAR EDEMA, WITH LONG-TERM CURRENT USE OF INSULIN (H): Primary | ICD-10-CM

## 2025-06-03 DIAGNOSIS — I10 HTN, GOAL BELOW 140/90: ICD-10-CM

## 2025-06-03 DIAGNOSIS — N18.31 TYPE 2 DIABETES MELLITUS WITH STAGE 3A CHRONIC KIDNEY DISEASE, WITH LONG-TERM CURRENT USE OF INSULIN (H): ICD-10-CM

## 2025-06-03 DIAGNOSIS — Z79.4 TYPE 2 DIABETES MELLITUS WITH STAGE 3 CHRONIC KIDNEY DISEASE, WITH LONG-TERM CURRENT USE OF INSULIN, UNSPECIFIED WHETHER STAGE 3A OR 3B CKD (H): Primary | ICD-10-CM

## 2025-06-03 DIAGNOSIS — E66.01 CLASS 2 SEVERE OBESITY DUE TO EXCESS CALORIES WITH SERIOUS COMORBIDITY AND BODY MASS INDEX (BMI) OF 39.0 TO 39.9 IN ADULT (H): ICD-10-CM

## 2025-06-03 DIAGNOSIS — K59.00 CONSTIPATION, UNSPECIFIED CONSTIPATION TYPE: ICD-10-CM

## 2025-06-03 DIAGNOSIS — E11.22 TYPE 2 DIABETES MELLITUS WITH STAGE 3 CHRONIC KIDNEY DISEASE, WITH LONG-TERM CURRENT USE OF INSULIN, UNSPECIFIED WHETHER STAGE 3A OR 3B CKD (H): Primary | ICD-10-CM

## 2025-06-03 LAB
ALBUMIN SERPL BCG-MCNC: 4.2 G/DL (ref 3.5–5.2)
ALP SERPL-CCNC: 82 U/L (ref 40–150)
ALT SERPL W P-5'-P-CCNC: 18 U/L (ref 0–50)
ANION GAP SERPL CALCULATED.3IONS-SCNC: 11 MMOL/L (ref 7–15)
AST SERPL W P-5'-P-CCNC: 17 U/L (ref 0–45)
BASOPHILS # BLD AUTO: 0 10E3/UL (ref 0–0.2)
BASOPHILS NFR BLD AUTO: 0 %
BILIRUB SERPL-MCNC: 0.2 MG/DL
BUN SERPL-MCNC: 26.9 MG/DL (ref 8–23)
CALCIUM SERPL-MCNC: 9.7 MG/DL (ref 8.8–10.4)
CHLORIDE SERPL-SCNC: 105 MMOL/L (ref 98–107)
CREAT SERPL-MCNC: 1.13 MG/DL (ref 0.51–0.95)
EGFRCR SERPLBLD CKD-EPI 2021: 54 ML/MIN/1.73M2
EOSINOPHIL # BLD AUTO: 0.2 10E3/UL (ref 0–0.7)
EOSINOPHIL NFR BLD AUTO: 3 %
ERYTHROCYTE [DISTWIDTH] IN BLOOD BY AUTOMATED COUNT: 12.9 % (ref 10–15)
EST. AVERAGE GLUCOSE BLD GHB EST-MCNC: 131 MG/DL
GLUCOSE SERPL-MCNC: 142 MG/DL (ref 70–99)
HBA1C MFR BLD: 6.2 % (ref 0–5.6)
HCO3 SERPL-SCNC: 22 MMOL/L (ref 22–29)
HCT VFR BLD AUTO: 30.5 % (ref 35–47)
HGB BLD-MCNC: 9.8 G/DL (ref 11.7–15.7)
IMM GRANULOCYTES # BLD: 0 10E3/UL
IMM GRANULOCYTES NFR BLD: 0 %
LYMPHOCYTES # BLD AUTO: 1.1 10E3/UL (ref 0.8–5.3)
LYMPHOCYTES NFR BLD AUTO: 20 %
MCH RBC QN AUTO: 30.7 PG (ref 26.5–33)
MCHC RBC AUTO-ENTMCNC: 32.1 G/DL (ref 31.5–36.5)
MCV RBC AUTO: 96 FL (ref 78–100)
MONOCYTES # BLD AUTO: 0.5 10E3/UL (ref 0–1.3)
MONOCYTES NFR BLD AUTO: 10 %
NEUTROPHILS # BLD AUTO: 3.5 10E3/UL (ref 1.6–8.3)
NEUTROPHILS NFR BLD AUTO: 66 %
PLATELET # BLD AUTO: 192 10E3/UL (ref 150–450)
POTASSIUM SERPL-SCNC: 4.6 MMOL/L (ref 3.4–5.3)
PROT SERPL-MCNC: 7.2 G/DL (ref 6.4–8.3)
RBC # BLD AUTO: 3.19 10E6/UL (ref 3.8–5.2)
SODIUM SERPL-SCNC: 138 MMOL/L (ref 135–145)
WBC # BLD AUTO: 5.3 10E3/UL (ref 4–11)

## 2025-06-03 PROCEDURE — 3078F DIAST BP <80 MM HG: CPT | Performed by: FAMILY MEDICINE

## 2025-06-03 PROCEDURE — 99214 OFFICE O/P EST MOD 30 MIN: CPT | Performed by: FAMILY MEDICINE

## 2025-06-03 PROCEDURE — 36415 COLL VENOUS BLD VENIPUNCTURE: CPT | Performed by: FAMILY MEDICINE

## 2025-06-03 PROCEDURE — 80053 COMPREHEN METABOLIC PANEL: CPT | Performed by: FAMILY MEDICINE

## 2025-06-03 PROCEDURE — 83036 HEMOGLOBIN GLYCOSYLATED A1C: CPT | Performed by: FAMILY MEDICINE

## 2025-06-03 PROCEDURE — 3044F HG A1C LEVEL LT 7.0%: CPT | Performed by: FAMILY MEDICINE

## 2025-06-03 PROCEDURE — 3074F SYST BP LT 130 MM HG: CPT | Performed by: FAMILY MEDICINE

## 2025-06-03 PROCEDURE — G2211 COMPLEX E/M VISIT ADD ON: HCPCS | Performed by: FAMILY MEDICINE

## 2025-06-03 PROCEDURE — 99606 MTMS BY PHARM EST 15 MIN: CPT | Performed by: PHARMACIST

## 2025-06-03 PROCEDURE — 85025 COMPLETE CBC W/AUTO DIFF WBC: CPT | Performed by: FAMILY MEDICINE

## 2025-06-03 RX ORDER — MUPIROCIN 20 MG/G
OINTMENT TOPICAL 2 TIMES DAILY PRN
Qty: 15 G | Refills: 0 | Status: SHIPPED | OUTPATIENT
Start: 2025-06-03

## 2025-06-03 NOTE — PATIENT INSTRUCTIONS
"Recommendations from today's MTM visit:                                                         Increase fiber to 3 days weekly on Tuesdays, Wednesdays, Thursdays.     Follow-up: 2 months    It was great speaking with you today.  I value your experience and would be very thankful for your time in providing feedback in our clinic survey. In the next few days, you may receive an email or text message from [facility] with a link to a survey related to your  clinical pharmacist.\"     To schedule another MTM appointment, please call the clinic directly or you may call the MTM scheduling line at [phone] or toll-free at [phone].     My Clinical Pharmacist's contact information:                                                      Please feel free to contact me with any questions or concerns you have.      Eugenia De Jesus, PharmD, BCACP   Medication Therapy Management Pharmacist   [facility]  [phone]    "

## 2025-06-03 NOTE — PROGRESS NOTES
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  She is due for an A1c check and we will do that today.  No current eye issues.  - HEMOGLOBIN A1C; Future  - HEMOGLOBIN A1C    Type 2 diabetes mellitus with stage 3a chronic kidney disease, with long-term current use of insulin (H)  Reviewed medications and they are currently up-to-date.  Will check CMP and A1c today.  - COMPREHENSIVE METABOLIC PANEL; Future  - COMPREHENSIVE METABOLIC PANEL    Rash and nonspecific skin eruption  This has resolved and we will transition the mupirocin to twice daily as needed.  - mupirocin (BACTROBAN) 2 % external ointment; Apply topically 2 times daily as needed (bacterial rashes, cuts and scrapes).    Anemia, unspecified type  Will check CBC and follow-up today.  Previously had hematology referral placed but it looks like it was rejected and the reasons are unclear why.  - CBC with platelets and differential; Future  - CBC with platelets and differential    Class 2 severe obesity due to excess calories with serious comorbidity and body mass index (BMI) of 39.0 to 39.9 in adult (H)  Obesity has continued to improve although she reports a little bit of weight gain since her last visit.  Comorbidities include diabetes and hypertension.    Invasive ductal carcinoma of breast, female, right (H)  Finishing radiation therapy for her invasive ductal carcinoma right breast next week.  Attributes some fatigue to the treatment but reports that her skin and the rest of her breast are doing well without significant pain.        Blood sugar testing frequency justification:  Adjustment of medication(s)    Follow-up    Follow-up Visit   Expected date:  Aug 03, 2025 (Approximate)      Follow Up Appointment Details:     Follow-up with whom?: Me    Follow-Up for what?:  Chronic Disease f/u    Chronic Disease f/u:  Anxiety  Depression  Diabetes  Hypertension       How?: In Person    Is this an as-needed follow-up?: No                 Subjective   Nena is a 65 year old, presenting for the following health issues:    Follow Up      6/3/2025    10:55 AM   Additional Questions   Roomed by Lucila   Accompanied by          6/3/2025   Forms   Any forms needing to be completed Yes     History of Present Illness       Diabetes:   She presents for follow up of diabetes.  She is checking home blood glucose three times daily.   She checks blood glucose after meals.  Blood glucose is sometimes over 200 and sometimes under 70. She is aware of hypoglycemia symptoms including shakiness.   She is concerned about blood sugar frequently over 200.    She is not experiencing numbness or burning in feet, excessive thirst, blurry vision, weight changes or redness, sores or blisters on feet.           Hypertension: She presents for follow up of hypertension.  She does check blood pressure  regularly outside of the clinic. Outpatient blood pressures have not been over 140/90. She does not follow a low salt diet.     She eats 0-1 servings of fruits and vegetables daily.She consumes 2 sweetened beverage(s) daily.She exercises with enough effort to increase her heart rate 9 or less minutes per day.  She exercises with enough effort to increase her heart rate 3 or less days per week.   She is taking medications regularly.            Patient is here today accompanied by staff from her group home.  She reports she is feeling well.  She continues to have some intermittent nocturnal leg cramps including a couple times last week.  Blood pressures are doing much better with values typically in the 120-130/70-80 range since the addition of small dose of hydrochlorothiazide to her losartan.  This has caused an increase in urination.  She is currently undergoing radiation therapy treatment for  "treatment of her invasive ductal breast cancer.  Tolerating it well but attributes some fatigue to it.  The rash in her intergluteal cleft has resolved.  Sleep is good.  No recent diarrhea.  Continues to enjoy going to her day program.      Review of Systems  Constitutional, HEENT, cardiovascular, pulmonary, gi and gu systems are negative, except as otherwise noted.      Objective    /60 (BP Location: Left arm, Patient Position: Sitting, Cuff Size: Adult Large)   Pulse 70   Temp 96.9  F (36.1  C) (Temporal)   Resp 16   Ht 1.55 m (5' 1.02\")   Wt 94.3 kg (208 lb)   LMP 01/06/2015 (Exact Date)   SpO2 97%   BMI 39.27 kg/m    Body mass index is 39.27 kg/m .  Physical Exam   GENERAL: alert and no distress  EYES: Eyes grossly normal to inspection, PERRL and conjunctivae and sclerae normal  NECK: no adenopathy, no asymmetry, masses, or scars  RESP: lungs clear to auscultation - no rales, rhonchi or wheezes  CV: regular rate and rhythm, normal S1 S2, no S3 or S4, no murmur, click or rub, no peripheral edema   MS: no gross musculoskeletal defects noted, no edema  SKIN: no suspicious lesions or rashes  NEURO: Normal strength and tone, mentation intact and speech normal  PSYCH: mentation appears normal, affect normal/bright    Office Visit on 03/11/2025   Component Date Value Ref Range Status    Estimated Average Glucose 03/11/2025 126 (H)  <117 mg/dL Final    Hemoglobin A1C 03/11/2025 6.0 (H)  0.0 - 5.6 % Final    Normal <5.7%   Prediabetes 5.7-6.4%    Diabetes 6.5% or higher     Note: Adopted from ADA consensus guidelines.    Sodium 03/11/2025 141  135 - 145 mmol/L Final    Potassium 03/11/2025 4.9  3.4 - 5.3 mmol/L Final    Chloride 03/11/2025 108 (H)  98 - 107 mmol/L Final    Carbon Dioxide (CO2) 03/11/2025 24  22 - 29 mmol/L Final    Anion Gap 03/11/2025 9  7 - 15 mmol/L Final    Urea Nitrogen 03/11/2025 17.3  8.0 - 23.0 mg/dL Final    Creatinine 03/11/2025 1.04 (H)  0.51 - 0.95 mg/dL Final    GFR Estimate " 03/11/2025 60 (L)  >60 mL/min/1.73m2 Final    eGFR calculated using 2021 CKD-EPI equation.    Calcium 03/11/2025 9.4  8.8 - 10.4 mg/dL Final    Glucose 03/11/2025 171 (H)  70 - 99 mg/dL Final    WBC Count 03/11/2025 6.1  4.0 - 11.0 10e3/uL Final    RBC Count 03/11/2025 2.98 (L)  3.80 - 5.20 10e6/uL Final    Hemoglobin 03/11/2025 9.2 (L)  11.7 - 15.7 g/dL Final    Hematocrit 03/11/2025 29.2 (L)  35.0 - 47.0 % Final    MCV 03/11/2025 98  78 - 100 fL Final    MCH 03/11/2025 30.9  26.5 - 33.0 pg Final    MCHC 03/11/2025 31.5  31.5 - 36.5 g/dL Final    RDW 03/11/2025 13.0  10.0 - 15.0 % Final    Platelet Count 03/11/2025 232  150 - 450 10e3/uL Final    % Neutrophils 03/11/2025 49  % Final    % Lymphocytes 03/11/2025 36  % Final    % Monocytes 03/11/2025 11  % Final    % Eosinophils 03/11/2025 4  % Final    % Basophils 03/11/2025 1  % Final    % Immature Granulocytes 03/11/2025 0  % Final    NRBCs per 100 WBC 03/11/2025 0  <1 /100 Final    Absolute Neutrophils 03/11/2025 3.0  1.6 - 8.3 10e3/uL Final    Absolute Lymphocytes 03/11/2025 2.2  0.8 - 5.3 10e3/uL Final    Absolute Monocytes 03/11/2025 0.6  0.0 - 1.3 10e3/uL Final    Absolute Eosinophils 03/11/2025 0.2  0.0 - 0.7 10e3/uL Final    Absolute Basophils 03/11/2025 0.0  0.0 - 0.2 10e3/uL Final    Absolute Immature Granulocytes 03/11/2025 0.0  <=0.4 10e3/uL Final    Absolute NRBCs 03/11/2025 0.0  10e3/uL Final           Signed Electronically by: Albino Rainey MD

## 2025-06-03 NOTE — PROGRESS NOTES
Medication Therapy Management (MTM) Encounter    ASSESSMENT:                            Medication Adherence/Access: No issues identified.    Diabetes  Stable.  Patient is meeting A1c goal of < 8%.   Continue to work on management of Mounjaro side effects as below.    Hypertension:   Improved with addition of hydrochlorothiazide. Continue to monitor.     Constipation/diarrhea  Having less diarrhea, fiber supplement seems to have been helpful. Will increase to taking 3 days weekly per endocrinology recommendation.     PLAN:                            Increase fiber to 3 days weekly on Tuesdays, Wednesdays, Thursdays.     Follow-up: 2 months    SUBJECTIVE/OBJECTIVE:                          Nena Tang is a 65 year old female seen for a follow-up visit. Today's visit is a co-visit with Albino Rainey MD. Patient was accompanied by group home staff.      Reason for visit: medication recheck.    Allergies/ADRs: Reviewed in chart  Past Medical History: Reviewed in chart  Tobacco: She reports that she has never smoked. She has never used smokeless tobacco.  Alcohol: not currently using    Medication Adherence/Access: no issues reported.  Patient has assistance with medication administration: group home.    Diabetes   Lantus 22 units once daily  Humalog sliding scale  Mounjaro 7.5mg weekly    Side effects: continues to have diarrhea for 1 day after administration of Mounjaro.  She does not find it bothersome.    Current diabetes symptoms: none  Rarely hypoglycemia, reports one time during day treatment in the last month.        Blood sugar monitoring: Continuous Glucose Monitor - did not bring meter today. Reports some elevated glucose readings but not persistent. She has been having coffee with sugar lately.     Eye exam is up to date  Foot exam is up to date    Hypertension   Amlodipine 10mg daily  Metoprolol XL 50mg AM and 100mg PM  Losartan-hydrochlorothiazide  100mg-12.5mg daily (new addition of  "hydrochlorothiazide)  Prazosin 1mg at bedtime (per psychiatry)  Patient reports no current medication side effects.   Patient has blood pressure monitored by group home.  Home BP monitoring :  130s systolic. Improved with addition of hydrochlorothiazide.        Constipation/diarrhea  Fiber twice weekly on Wednesdays and Thursdays.  Holding senna-docusate.  Diarrhea the day after Mounjaro injections as above. No constipation concerns.    Today's Vitals: LMP 01/06/2015 (Exact Date)   BP Readings from Last 1 Encounters:   06/03/25 118/60     Pulse Readings from Last 1 Encounters:   06/03/25 70     Wt Readings from Last 1 Encounters:   06/03/25 208 lb (94.3 kg)     Ht Readings from Last 1 Encounters:   06/03/25 5' 1.02\" (1.55 m)     Estimated body mass index is 39.27 kg/m  as calculated from the following:    Height as of an earlier encounter on 6/3/25: 5' 1.02\" (1.55 m).    Weight as of an earlier encounter on 6/3/25: 208 lb (94.3 kg).    Temp Readings from Last 1 Encounters:   06/03/25 96.9  F (36.1  C) (Temporal)      ----------------      I spent 15 minutes with this patient today. All changes were made via collaborative practice agreement with Albino Rainey MD.     A summary of these recommendations was given to the patient (see AVS from today's appointment with PCP).    Eugenia De Jesus, PharmD, BCACP   Medication Therapy Management Pharmacist   [facility]  [phone]          Medication Therapy Recommendations  Diarrhea, unspecified type   1 Current Medication: FIBER- MG tablet   Current Medication Sig: TAKE 2 TABLETS BY MOUTH TWICE A WEEK . TAKE WEDNESDAY WITH OZEMPIC AND THURSDAY DAY AFTER OZEMPIC. DISCONTINUE IF DIARRHEA   Rationale: Frequency inappropriate - Dosage too low - Effectiveness   Recommendation: Increase Frequency   Status: Patient Agreed - Adherence/Education   Identified Date: 6/3/2025 Completed Date: 6/3/2025            "

## 2025-06-09 ENCOUNTER — TRANSFERRED RECORDS (OUTPATIENT)
Dept: HEALTH INFORMATION MANAGEMENT | Facility: CLINIC | Age: 65
End: 2025-06-09
Payer: COMMERCIAL

## 2025-06-10 NOTE — PATIENT INSTRUCTIONS
-Increase Ozempic to 0.50 mg weekly.   -Checking complete blood count, liver funtion tests and sedimentation rate- next due in July/August  -Increase Lantus to 60 units at bedtime.   -Continue to hold Ibuprofen for now and we can re-evaluate after the next lab check.    None

## 2025-06-17 ENCOUNTER — DOCUMENTATION ONLY (OUTPATIENT)
Dept: RHEUMATOLOGY | Facility: CLINIC | Age: 65
End: 2025-06-17

## 2025-06-17 NOTE — PROGRESS NOTES
Thank you,!    Siabell, I believe she lives in a facility. Is there anyway we can pass on a message to the physician at the facility to look further into Hb drop? Thank you.          ===View-only below this line===  ----- Message -----  From: Chanell Duque PA-C  Sent: 6/17/2025   9:45 AM CDT  To: Alexus Posey MD; Albino Rainey MD  Subject: referral rejected                                Dear Greer Posey and Redd,  It appears that you have both attempted to refer Nena to Heme but the referral has twice been rejected.  Once they requested more info, more recently stated receiving appropriate care.  I just wanted to reach out to assure this is managed as Hgb has dropped further.  She has follow-up with both of you in August.  It is my privilege to be involved in the care of the above patient.     Chanell Duque PA-C, MPAS  Diabetes, Endocrinology, and Metabolism  [phone] Appointments/Nurse Line  [phone] Fax  [phone] office  [email or web address]

## 2025-06-22 ENCOUNTER — RESULTS FOLLOW-UP (OUTPATIENT)
Dept: FAMILY MEDICINE | Facility: CLINIC | Age: 65
End: 2025-06-22
Payer: COMMERCIAL

## 2025-06-22 ENCOUNTER — MYC MEDICAL ADVICE (OUTPATIENT)
Dept: FAMILY MEDICINE | Facility: CLINIC | Age: 65
End: 2025-06-22
Payer: COMMERCIAL

## 2025-07-07 ENCOUNTER — LAB (OUTPATIENT)
Dept: INFUSION THERAPY | Facility: CLINIC | Age: 65
End: 2025-07-07
Attending: INTERNAL MEDICINE
Payer: COMMERCIAL

## 2025-07-07 ENCOUNTER — ONCOLOGY VISIT (OUTPATIENT)
Dept: ONCOLOGY | Facility: CLINIC | Age: 65
End: 2025-07-07
Attending: INTERNAL MEDICINE
Payer: COMMERCIAL

## 2025-07-07 VITALS
DIASTOLIC BLOOD PRESSURE: 76 MMHG | SYSTOLIC BLOOD PRESSURE: 116 MMHG | BODY MASS INDEX: 39.84 KG/M2 | RESPIRATION RATE: 16 BRPM | HEART RATE: 72 BPM | OXYGEN SATURATION: 100 % | WEIGHT: 211 LBS | TEMPERATURE: 97.5 F | HEIGHT: 61 IN

## 2025-07-07 DIAGNOSIS — K08.89 PAIN, DENTAL: ICD-10-CM

## 2025-07-07 DIAGNOSIS — Z79.811 LONG TERM (CURRENT) USE OF AROMATASE INHIBITORS: ICD-10-CM

## 2025-07-07 DIAGNOSIS — Z79.811 LONG TERM (CURRENT) USE OF AROMATASE INHIBITORS: Primary | ICD-10-CM

## 2025-07-07 DIAGNOSIS — C50.911 INVASIVE DUCTAL CARCINOMA OF BREAST, FEMALE, RIGHT (H): Primary | ICD-10-CM

## 2025-07-07 DIAGNOSIS — C50.911 INVASIVE DUCTAL CARCINOMA OF BREAST, FEMALE, RIGHT (H): ICD-10-CM

## 2025-07-07 LAB
ALBUMIN SERPL BCG-MCNC: 4.2 G/DL (ref 3.5–5.2)
CALCIUM SERPL-MCNC: 9.7 MG/DL (ref 8.8–10.4)
CREAT SERPL-MCNC: 1.73 MG/DL (ref 0.51–0.95)
EGFRCR SERPLBLD CKD-EPI 2021: 32 ML/MIN/1.73M2

## 2025-07-07 PROCEDURE — 96374 THER/PROPH/DIAG INJ IV PUSH: CPT

## 2025-07-07 PROCEDURE — G0463 HOSPITAL OUTPT CLINIC VISIT: HCPCS | Performed by: INTERNAL MEDICINE

## 2025-07-07 PROCEDURE — 36415 COLL VENOUS BLD VENIPUNCTURE: CPT | Performed by: INTERNAL MEDICINE

## 2025-07-07 PROCEDURE — 82565 ASSAY OF CREATININE: CPT | Performed by: INTERNAL MEDICINE

## 2025-07-07 PROCEDURE — 82040 ASSAY OF SERUM ALBUMIN: CPT | Performed by: INTERNAL MEDICINE

## 2025-07-07 PROCEDURE — 82310 ASSAY OF CALCIUM: CPT | Performed by: INTERNAL MEDICINE

## 2025-07-07 PROCEDURE — 99215 OFFICE O/P EST HI 40 MIN: CPT | Performed by: INTERNAL MEDICINE

## 2025-07-07 PROCEDURE — 258N000003 HC RX IP 258 OP 636: Performed by: INTERNAL MEDICINE

## 2025-07-07 PROCEDURE — 250N000011 HC RX IP 250 OP 636: Performed by: INTERNAL MEDICINE

## 2025-07-07 RX ORDER — CHLORHEXIDINE GLUCONATE ORAL RINSE 1.2 MG/ML
10 SOLUTION DENTAL 2 TIMES DAILY
Qty: 473 ML | Refills: 11 | Status: SHIPPED | OUTPATIENT
Start: 2025-07-07

## 2025-07-07 RX ORDER — HEPARIN SODIUM (PORCINE) LOCK FLUSH IV SOLN 100 UNIT/ML 100 UNIT/ML
5 SOLUTION INTRAVENOUS
Status: CANCELLED | OUTPATIENT
Start: 2025-07-07

## 2025-07-07 RX ORDER — ZOLEDRONIC ACID 0.04 MG/ML
4 INJECTION, SOLUTION INTRAVENOUS ONCE
Status: CANCELLED | OUTPATIENT
Start: 2025-07-07 | End: 2025-07-07

## 2025-07-07 RX ORDER — HYDROCODONE BITARTRATE AND ACETAMINOPHEN 5; 325 MG/1; MG/1
1 TABLET ORAL EVERY 6 HOURS PRN
COMMUNITY

## 2025-07-07 RX ORDER — HEPARIN SODIUM,PORCINE 10 UNIT/ML
5-20 VIAL (ML) INTRAVENOUS DAILY PRN
Status: CANCELLED | OUTPATIENT
Start: 2025-07-07

## 2025-07-07 RX ADMIN — ZOLEDRONIC ACID 3 MG: 4 INJECTION, SOLUTION, CONCENTRATE INTRAVENOUS at 11:55

## 2025-07-07 RX ADMIN — SODIUM CHLORIDE 250 ML: 0.9 INJECTION, SOLUTION INTRAVENOUS at 11:57

## 2025-07-07 ASSESSMENT — PAIN SCALES - GENERAL: PAINLEVEL_OUTOF10: NO PAIN (0)

## 2025-07-07 NOTE — PROGRESS NOTES
"Oncology Rooming Note    July 7, 2025 11:05 AM   Nena Tang is a 65 year old female who presents for:    Chief Complaint   Patient presents with    Oncology Clinic Visit     Invasive ductal carcinoma of breast, female, right (H)     Initial Vitals: /76 (BP Location: Right arm, Patient Position: Sitting, Cuff Size: Adult Large)   Pulse 72   Temp 97.5  F (36.4  C) (Oral)   Resp 16   Ht 1.549 m (5' 1\")   Wt 95.7 kg (211 lb)   LMP 01/06/2015 (Exact Date)   SpO2 100%   BMI 39.87 kg/m   Estimated body mass index is 39.87 kg/m  as calculated from the following:    Height as of this encounter: 1.549 m (5' 1\").    Weight as of this encounter: 95.7 kg (211 lb). Body surface area is 2.03 meters squared.  No Pain (0) Comment: Data Unavailable   Patient's last menstrual period was 01/06/2015 (exact date).  Allergies reviewed: Yes  Medications reviewed: Yes    Medications: Medication refills not needed today.  Pharmacy name entered into [employer]:    [facility] - [place], [place] - [address] [place] [address] - [address]  [id number] - [place], [place] - [address] AT [facility] [address]    Frailty Screening:   Is the patient here for a new oncology consult visit in cancer care? 2. No    PHQ9:  Did this patient require a PHQ9?: No      Clinical concerns: None       Belen Sen MA            "

## 2025-07-07 NOTE — PROGRESS NOTES
Infusion Nursing Note:  Nena Tang presents today for zometa.    Patient seen by provider today: Yes: Phuong   present during visit today: Not Applicable.    Note: N/A.      Intravenous Access:  Peripheral IV placed.    Treatment Conditions:  Lab Results   Component Value Date     06/03/2025    POTASSIUM 4.6 06/03/2025    MAG 1.9 09/25/2021    CR 1.73 (H) 07/07/2025    ALLEN 9.7 07/07/2025    BILITOTAL 0.2 06/03/2025    ALBUMIN 4.2 07/07/2025    ALT 18 06/03/2025    AST 17 06/03/2025       Results reviewed, labs MET treatment parameters, ok to proceed with treatment.      Post Infusion Assessment:  Patient tolerated infusion without incident.  Blood return noted pre and post infusion.  Site patent and intact, free from redness, edema or discomfort.  No evidence of extravasations.  Access discontinued per protocol.       Discharge Plan:   Discharge instructions reviewed with: Patient.  Patient and/or family verbalized understanding of discharge instructions and all questions answered.  Patient discharged in stable condition accompanied by: self.  Departure Mode: Ambulatory.      Jill Day RN

## 2025-07-07 NOTE — PROGRESS NOTES
Medical Assistant Note:  Nena Tang presents today for blood draw.    Patient seen by provider today: Yes: funmilayo.   present during visit today: Not Applicable.    Concerns: No Concerns.    Procedure:  Lab draw site: lac, Needle type: bf, Gauge: 23.    Post Assessment:  Labs drawn without difficulty: Yes.    Discharge Plan:  Departure Mode: Ambulatory.    Face to Face Time: 5 min.    Nisa Platt, CMA

## 2025-07-07 NOTE — LETTER
"7/7/2025      Nena Tang  [address] [facility] [address]      Dear Colleague,    Thank you for referring your patient, Nena Tang, to the [facility]. Please see a copy of my visit note below.    Oncology Rooming Note    July 7, 2025 11:05 AM   Nena Tang is a 65 year old female who presents for:    Chief Complaint   Patient presents with     Oncology Clinic Visit     Invasive ductal carcinoma of breast, female, right (H)     Initial Vitals: /76 (BP Location: Right arm, Patient Position: Sitting, Cuff Size: Adult Large)   Pulse 72   Temp 97.5  F (36.4  C) (Oral)   Resp 16   Ht 1.549 m (5' 1\")   Wt 95.7 kg (211 lb)   LMP 01/06/2015 (Exact Date)   SpO2 100%   BMI 39.87 kg/m   Estimated body mass index is 39.87 kg/m  as calculated from the following:    Height as of this encounter: 1.549 m (5' 1\").    Weight as of this encounter: 95.7 kg (211 lb). Body surface area is 2.03 meters squared.  No Pain (0) Comment: Data Unavailable   Patient's last menstrual period was 01/06/2015 (exact date).  Allergies reviewed: Yes  Medications reviewed: Yes    Medications: Medication refills not needed today.  Pharmacy name entered into [email or web address]:    [facility] - [place], [place] - [address] [place] [address] - [address] [id number] - [place], [place] - [address] AT [facility] [address]    Frailty Screening:   Is the patient here for a new oncology consult visit in cancer care? 2. No    PHQ9:  Did this patient require a PHQ9?: No      Clinical concerns: None       Belen Sen MA              [facility]    Hematology/Oncology return patient note      Today's Date:   July 7 2025  Reason for Consult: breast cancer       HISTORY OF PRESENT ILLNESS: Nena is a 64 year old female with the following oncologic history:    1 Screening mammogram in December 2024 showed " calcifications in the right upper outer breast posterior depth  2 right breast ultrasound showed 1.7 cm asymmetry with 1 abnormal ultarsound   3 biopsy showed invasive ductal carcinoma with mucinous features efrem grade 2 of 3.   Lymph node right axillary metastatic caricoma in one lymph node with almost complete replacement ER/FL + her 2 mikie negative IHC 0, ki 67 8%  4  right breast lumpectomy on 1/27/2025 final pathology showed a grade 3 invasive ductal carcinoma 2.5 cm with no evidence of lymphovascular invasion 3 out of 6 lymph nodes positive for metastatic carcinoma.  Margins negative.  The tumor was estrogen receptor positive progesterone receptor positive HER2/mikie negative    Lives in a group home, multiple co morbidities. Her nurse is with her today.  She is much more functional and walks short distances.  Diabetic but blood blood sugars are doing better.         Lou returns for follow-up today.  She started anastrozole in April.  Radiation went well sheh she started anastrozole in June.  Radiation went well.  She completed radiation in June.  She presents with her caregiver today.  Complaining of some hot flashes status post right breast lumpectomy and radiation changes.  Scar is very well-healed      REVIEW OF SYSTEMS:   14 point ROS was reviewed and is negative other than as noted above in HPI.       HOME MEDICATIONS:  Current Outpatient Medications   Medication Sig Dispense Refill     acetaminophen (TYLENOL) 500 MG tablet TAKE 2 TABLETS BY MOUTH THREE TIMES A  tablet 4     albuterol (PROAIR HFA/PROVENTIL HFA/VENTOLIN HFA) 108 (90 Base) MCG/ACT inhaler Inhale 2 puffs into the lungs every 6 hours as needed for shortness of breath, wheezing or cough 18 g 3     Alcohol Swabs (EASY TOUCH ALCOHOL PREP MEDIUM) 70 % PADS APPLY 1 UNITS TOPICALLY 8 TIMES DAILY 200 each 3     amantadine (SYMMETREL) 100 MG capsule TAKE 1 CAPSULE BY MOUTH DAILY 28 capsule 11     amLODIPine (NORVASC) 10 MG tablet Take 1  tablet (10 mg) by mouth every morning. 30 tablet 11     anastrozole (ARIMIDEX) 1 MG tablet Take 1 tablet (1 mg) by mouth daily. 90 tablet 3     aspirin (ASPIRIN LOW DOSE) 81 MG EC tablet Take 1 tablet (81 mg) by mouth daily. 28 tablet 11     atorvastatin (LIPITOR) 80 MG tablet TAKE 1 TABLET BY MOUTH DAILY 28 tablet 5     blood glucose (ONETOUCH ULTRA) test strip USE TO TEST BLOOD SUGAR 10 TIMES DAILY OR AS DIRECTED. 300 strip 11     buPROPion (WELLBUTRIN XL) 150 MG 24 hr tablet Take 1 tablet (150 mg) by mouth every morning. 30 tablet 11     calcium carbonate (OS-ALLEN) 1500 (600 Ca) MG tablet TAKE 1 TABLET BY MOUTH DAILY 28 tablet 5     calcium polycarbophil (FIBER-LAX) 625 MG tablet Take 2 tablets 3-5 days per week, Tues, Wed and Thurs and if needed Sat and / or Sunday to prevent and manage loose stools. 40 tablet 11     chlorhexidine (PERIDEX) 0.12 % solution Swish and spit 10 mLs in mouth 2 times daily 1893 mL 3     Cholecalciferol (D3 HIGH POTENCY) 25 MCG (1000 UT) CAPS TAKE 2 CAPSULES BY MOUTH DAILY 180 capsule 2     clonazePAM (KLONOPIN) 0.5 MG tablet TAKE 1 TABLET BY MOUTH AT BEDTIME 28 tablet 5     Continuous Glucose Sensor (DEXCOM G6 SENSOR) MISC Change every 10 days. 9 each 4     Continuous Glucose Transmitter (DEXCOM G6 TRANSMITTER) MISC Change every 3 months. 1 each 4     diclofenac (VOLTAREN) 1 % topical gel APPLY 4 GRAMS TO PAINFUL AREA AT BEDTIME . MAY USE AN ADDITIONAL 3 DOSES DURING 100 g 1     gabapentin (NEURONTIN) 300 MG capsule TAKE 1 CAPSULE BY MOUTH AT BEDTIME 28 capsule 11     HUMALOG KWIKPEN 100 UNIT/ML soln Inject 0-12 Units subcutaneously 4 times daily (with meals and nightly) Before meals: BG 81 - 150:  0 units 151 - 200: 2 units, 201 - 250: 4 units, 251-300: 6 units, 301 - 350: 8 units, 351 or greater : 10 units At BEDTIME-If >4 hours since last Humalog injection For  - 249 give 1 units, 250 - 299 give 2 units,300 - 349 give 3 units, = or > 350 give 4 units. 15 mL 1     hydrOXYzine  (VISTARIL) 25 MG capsule TAKE 1 CAPSULE BY MOUTH EVERY NIGHT AT BEDTIME ANXIETY/SLEEP 28 capsule 0     ibuprofen (ADVIL/MOTRIN) 200 MG tablet Take 200 mg by mouth every 4 hours as needed for pain       insulin glargine (LANTUS SOLOSTAR) 100 UNIT/ML pen Inject 22 Units subcutaneously daily. 45 mL 1     insulin pen needle (BD AUTOSHIELD DUO) 30G X 5 MM USE 6 DAILY OR AS DIRECTED 200 each 10     Lancets (ONETOUCH DELICA PLUS POGSRR66I) MISC 1 EACH AS NEEDED  USE TO TEST BLOOD SUGARS 1-2 TIMES DAILY AS DIRECTED 100 each 3     leflunomide (ARAVA) 20 MG tablet TAKE 1 TABLET BY MOUTH DAILY *LABS EVERY 8-12 WEEKS 30 tablet 1     Lidocaine (LIDOCAINE PAIN RELIEF) 4 % Patch APPLY 1 PATCH ONTO SKIN EVERY 24 HOURS as needed; APPLY AT NIGHT AND REMOVE IN THE MORNING ( TWELVE HOURS PATCH FREE). 30 patch 11     loperamide (IMODIUM) 2 MG capsule Take 1 capsule (2 mg) by mouth 4 times daily as needed for diarrhea. 30 capsule 11     losartan-hydrochlorothiazide (HYZAAR) 100-12.5 MG tablet Take 1 tablet by mouth daily. 30 tablet 11     Magnesium Oxide 250 MG TABS Take 1 tablet (250 mg) by mouth at bedtime. 28 tablet 11     melatonin 5 MG tablet Take 5 mg by mouth at bedtime       metoprolol succinate ER (TOPROL XL) 50 MG 24 hr tablet TAKE 1 TABLET BY MOUTH IN THE MORNING AND TAKE 2 TABLETS AT BEDTIME 84 tablet 11     mirabegron (MYRBETRIQ) 50 MG 24 hr tablet Take 1 tablet (50 mg) by mouth daily. 90 tablet 5     mupirocin (BACTROBAN) 2 % external ointment Apply topically 2 times daily as needed (bacterial rashes, cuts and scrapes). 15 g 0     naproxen (NAPROSYN) 375 MG tablet Take 1 tablet (375 mg) by mouth 2 times daily as needed for moderate pain (denatl pain) 60 tablet 11     NYAMYC 007264 UNIT/GM external powder APPLY TOPICALLY TWICE A DAY AS NEEDED 60 g 0     ondansetron (ZOFRAN) 4 MG tablet TAKE 1 TABLET BY MOUTH EVERY 8 HOURS AS NEEDED FOR NAUSEA 10 tablet 1     order for DME Equipment being ordered: Depends. 30 each 4     order  for DME Equipment being ordered: CPAP Supplies. 1 each 0     paliperidone ER (INVEGA) 6 MG 24 hr tablet TAKE 2 TABLETS BY MOUTH EVERY NIGHT AT BEDTIME 60 tablet 11     pantoprazole (PROTONIX) 40 MG EC tablet Take 1 tablet (40 mg) by mouth daily. 28 tablet 11     prazosin (MINIPRESS) 1 MG capsule Take 1 mg by mouth at bedtime       QUEtiapine (SEROQUEL) 100 MG tablet Take 100 mg by mouth at bedtime       senna-docusate (SENOKOT S) 8.6-50 MG tablet Take 1 tablet by mouth daily as needed for constipation. 30 tablet 11     senna-docusate (SENOKOT-S/PERICOLACE) 8.6-50 MG tablet Take 1 tablet by mouth 2 times daily as needed for constipation (While on oral opioids.). 10 tablet 0     sertraline (ZOLOFT) 100 MG tablet Take 100 mg by mouth daily       tirzepatide (MOUNJARO) 10 MG/0.5ML SOAJ auto-injector pen Inject 0.5 mLs (10 mg) subcutaneously every 7 days. 2 mL 0     vitamin C (ASCORBIC ACID) 500 MG tablet TAKE 2 TABLETS BY MOUTH IN THE MORNING AND TAKE 2 TABLETS BY MOUTH IN THE EVENING 112 tablet 8     vitamin E (TOCOPHEROL) 400 units (180 mg) capsule Take 1 capsule (400 Units) by mouth daily. 30 capsule 11     zinc oxide (DESITIN) 20 % external ointment Apply topically 3 times daily as needed for irritation (barrier cream) 60 g 3         ALLERGIES:  Allergies   Allergen Reactions     Imidazole Antifungals Hives     Tolerates diflucan     Ketoprofen Itching     Pruritis to topical     Lisinopril Hives     Metformin Other (See Comments)     Patient hospitalized for lactic acidosis - admitting provider suspectd caused by metformin     Metronidazole Hives     Penicillins      Posaconazole Hives     Tolerates diflucan         PAST MEDICAL HISTORY:  Past Medical History:   Diagnosis Date     Acute respiratory failure with hypoxia (H) 09/04/2017     CAD (coronary artery disease)     5/2014 cath, nonbostructive stenosis to LAD, RCA.     Chronic low back pain 01/22/2013     Cocaine abuse, in remission (H)      Fecal urgency  03/08/2012     History of heroin abuse (H)      Hyperlipidemia LDL goal <100 10/31/2010     Hypertension 07/29/2013     Illiterate 08/30/2011     Irritable bowel syndrome      Left cataract      Migraine 04/19/2012     Migraine headache 04/22/2013     Moderate major depression (H) 06/08/2011     Noncompliance with medication regimen 06/08/2011     Obesity      CINDY (obstructive sleep apnea) 03/08/2012    does not use CPAP     CINDY (obstructive sleep apnea)- mild AHI 10.3      Osteopenia 10/07/2009     Pneumonia 7/28/2019     Pneumonia of right lower lobe due to infectious organism 09/04/2017     Schizoaffective disorder, depressive type (H) 02/25/2013     Sepsis (H) 08/29/2017     Suicidal intent 10/02/2013     Takotsubo cardiomyopathy      Type 2 diabetes mellitus (H) 08/30/2011     Uterine cancer (H) 1983     Verbal auditory hallucination 10/04/2012         PAST SURGICAL HISTORY:  Past Surgical History:   Procedure Laterality Date     BIOPSY NODE SENTINEL Right 1/27/2025    Procedure: radiofrequency localized right axillary lymph node excision, right axillary sentinel lymph node biopsy;  Surgeon: Albino Giron MD;  Location: [facility] OR     CATARACT IOL, RT/LT Bilateral 2017     CHOLECYSTECTOMY       COLONOSCOPY N/A 03/16/2017    Procedure: COLONOSCOPY;  Surgeon: Traci Gonzalez MD;  Location: [facility]     Coronary CTA  05/21/2014     HYSTERECTOMY  1983    uterine cancer yearly pap's per provider.     LAPAROSCOPIC CHOLECYSTECTOMY  2008     LUMPECTOMY, BREAST, LOCALIZED USING RADIOFREQUENCY IDENTIFICATION Right 1/27/2025    Procedure: Radiofrequency localized right partial mastectomy;  Surgeon: Albino Giron MD;  Location: [facility] OR     PHACOEMULSIFICATION CLEAR CORNEA WITH STANDARD INTRAOCULAR LENS IMPLANT Left 05/05/2017    Procedure: PHACOEMULSIFICATION CLEAR CORNEA WITH STANDARD INTRAOCULAR LENS IMPLANT;  LEFT EYE PHACOEMULSIFICATION CLEAR CORNEA WITH STANDARD INTRAOCULAR LENS IMPLANT ;   Surgeon: Tyra Diaz MD;  Location:  [facility]     PHACOEMULSIFICATION CLEAR CORNEA WITH STANDARD INTRAOCULAR LENS IMPLANT Right 06/30/2017    Procedure: PHACOEMULSIFICATION CLEAR CORNEA WITH STANDARD INTRAOCULAR LENS IMPLANT;  RIGHT EYE PHACOEMULSIFICATION CLEAR CORNEA WITH STANDARD INTRAOCULAR LENS IMPLANT;  Surgeon: Tyra Diaz MD;  Location:  [facility]     RELEASE TRIGGER FINGER  10/11/2012    Left thumb. Procedure: RELEASE TRIGGER FINGER;  LEFT THUMB TRIGGER RELEASE;  Surgeon: Tay Langley MD;  Location:  [facility]     RELEASE TRIGGER FINGER Right 12/26/2016    Procedure: RELEASE TRIGGER FINGER;  Surgeon: Albino Castañeda MD;  Location:  OR     [facility] OOPHORECTORMY FOR MALIG, W/BX  1983    UTERINE         SOCIAL HISTORY:  Social History     Socioeconomic History     Marital status:      Spouse name: Not on file     Number of children: 2     Years of education: Not on file     Highest education level: Not on file   Occupational History     Not on file   Tobacco Use     Smoking status: Never     Smokeless tobacco: Never   Vaping Use     Vaping status: Never Used   Substance and Sexual Activity     Alcohol use: Not Currently     Comment: when younger     Drug use: No     Comment: history of     Sexual activity: Not Currently     Partners: Male     Birth control/protection: Post-menopausal   Other Topics Concern     Parent/sibling w/ CABG, MI or angioplasty before 65F 55M? Not Asked      Service No     Blood Transfusions No     Caffeine Concern No     Occupational Exposure No     Hobby Hazards No     Sleep Concern Yes     Stress Concern Yes     Weight Concern Yes     Special Diet Yes     Comment: DM     Back Care Yes     Exercise Yes     Comment: WALKS DAILY     Bike Helmet Not Asked     Seat Belt Yes     Self-Exams No     Comment: ENCOURAGED   Social History Narrative     10/2014. Has 2 sons. 7 grandchildren.         Unemployed. Graduated HS.         Tobacco use: Denies    Alcohol use:  Escalated use since   10/2014    Drug: Denies     Social Drivers of Health     Financial Resource Strain: Low Risk  (2025)    Financial Resource Strain      Within the past 12 months, have you or your family members you live with been unable to get utilities (heat, electricity) when it was really needed?: No   Food Insecurity: Low Risk  (2025)    Food Insecurity      Within the past 12 months, did you worry that your food would run out before you got money to buy more?: No      Within the past 12 months, did the food you bought just not last and you didn t have money to get more?: No   Transportation Needs: Low Risk  (2025)    Transportation Needs      Within the past 12 months, has lack of transportation kept you from medical appointments, getting your medicines, non-medical meetings or appointments, work, or from getting things that you need?: No   Physical Activity: Insufficiently Active (2025)    Exercise Vital Sign      Days of Exercise per Week: 2 days      Minutes of Exercise per Session: 60 min   Stress: No Stress Concern Present (2025)    [employer] - Occupational Stress Questionnaire      Feeling of Stress : Only a little   Social Connections: Unknown (2025)    Social Connection and Isolation Panel [NHANES]      Frequency of Communication with Friends and Family: Not on file      Frequency of Social Gatherings with Friends and Family: Twice a week      Attends Mandaeism Services: Not on file      Active Member of Clubs or Organizations: Not on file      Attends Club or Organization Meetings: Not on file      Marital Status: Not on file   Interpersonal Safety: Low Risk  (2025)    Interpersonal Safety      Do you feel physically and emotionally safe where you currently live?: Yes      Within the past 12 months, have you been hit, slapped, kicked or otherwise physically hurt by someone?: No      Within the past 12 months, have you  "been humiliated or emotionally abused in other ways by your partner or ex-partner?: No   Housing Stability: Low Risk  (2025)    Housing Stability      Do you have housing? : Yes      Are you worried about losing your housing?: No         FAMILY HISTORY:  Family History   Problem Relation Age of Onset     Cancer Mother         BLADDER     Respiratory Mother         COPD     Gastrointestinal Disease Mother         CIRRHOSIS OF LI BOLIVAR     Alcohol/Drug Mother      Diabetes Mother      Hypertension Mother      Lipids Mother      C.A.D. Mother      Glaucoma Mother      Alcohol/Drug Sister      Mental Illness Sister      Alcohol/Drug Sister      Psychotic Disorder Sister      Cancer Maternal Grandmother         UNKNOWN TYPE     Cancer Brother         COLON     Cancer - colorectal Brother         IN HIS LATE 30S     Alcohol/Drug Brother          OF HEROIN OVERDOSE AT AGE 22 YRS     Macular Degeneration No family hx of          PHYSICAL EXAM:  Vital signs:  /76 (BP Location: Right arm, Patient Position: Sitting, Cuff Size: Adult Large)   Pulse 72   Temp 97.5  F (36.4  C) (Oral)   Resp 16   Ht 1.549 m (5' 1\")   Wt 95.7 kg (211 lb)   LMP 2015 (Exact Date)   SpO2 100%   BMI 39.87 kg/m     [facility]       S/p right breast lumpectomy and grade 1 lymphedema.  Radiation changes right breast.  Left breast normal  No adenopathy  LABS:  noted    PATHOLOGY:  As per interval history    IMAGING:  Noted dexa shows osteoperosis    ASSESSMENT/PLAN:  Nena Tang is a 65 year old female with newly diagnosed breast cancer  ER+, with christiano involvement     She is not a candidate for any chemotherapy Discussed starting anastrozole.  She will also need adjuvant Zometa due to osteoporosis with her bone density    --continue anastrazole  --add magnesium glycinate 400 mg q hs  --zometa today side effects reminded of again  --start mg for hot flashes   --6 month follow up with MD     Total time spent on day of visit, " including review of tests, obtaining/reviewing separately obtained history, ordering medications/tests/procedures, communicating with PCP/consultants, and documenting in electronic medical record: 45  minutes             Xiang Baca MD  Hematology/Oncology  [facility]     Again, thank you for allowing me to participate in the care of your patient.        Sincerely,        Xiang Baca MD    Electronically signed

## 2025-07-07 NOTE — PROGRESS NOTES
[facility]    Hematology/Oncology return patient note      Today's Date:   July 7 2025  Reason for Consult: breast cancer       HISTORY OF PRESENT ILLNESS: Nena is a 64 year old female with the following oncologic history:    1 Screening mammogram in December 2024 showed calcifications in the right upper outer breast posterior depth  2 right breast ultrasound showed 1.7 cm asymmetry with 1 abnormal ultarsound   3 biopsy showed invasive ductal carcinoma with mucinous features efrem grade 2 of 3.   Lymph node right axillary metastatic caricoma in one lymph node with almost complete replacement ER/LA + her 2 mikie negative IHC 0, ki 67 8%  4  right breast lumpectomy on 1/27/2025 final pathology showed a grade 3 invasive ductal carcinoma 2.5 cm with no evidence of lymphovascular invasion 3 out of 6 lymph nodes positive for metastatic carcinoma.  Margins negative.  The tumor was estrogen receptor positive progesterone receptor positive HER2/mikie negative    Lives in a group home, multiple co morbidities. Her nurse is with her today.  She is much more functional and walks short distances.  Diabetic but blood blood sugars are doing better.         Lou returns for follow-up today.  She started anastrozole in April.  Radiation went well sheh she started anastrozole in June.  Radiation went well.  She completed radiation in June.  She presents with her caregiver today.  Complaining of some hot flashes status post right breast lumpectomy and radiation changes.  Scar is very well-healed      REVIEW OF SYSTEMS:   14 point ROS was reviewed and is negative other than as noted above in HPI.       HOME MEDICATIONS:  Current Outpatient Medications   Medication Sig Dispense Refill    acetaminophen (TYLENOL) 500 MG tablet TAKE 2 TABLETS BY MOUTH THREE TIMES A  tablet 4    albuterol (PROAIR HFA/PROVENTIL HFA/VENTOLIN HFA) 108 (90 Base) MCG/ACT inhaler Inhale 2 puffs into the lungs every 6 hours as  needed for shortness of breath, wheezing or cough 18 g 3    Alcohol Swabs (EASY TOUCH ALCOHOL PREP MEDIUM) 70 % PADS APPLY 1 UNITS TOPICALLY 8 TIMES DAILY 200 each 3    amantadine (SYMMETREL) 100 MG capsule TAKE 1 CAPSULE BY MOUTH DAILY 28 capsule 11    amLODIPine (NORVASC) 10 MG tablet Take 1 tablet (10 mg) by mouth every morning. 30 tablet 11    anastrozole (ARIMIDEX) 1 MG tablet Take 1 tablet (1 mg) by mouth daily. 90 tablet 3    aspirin (ASPIRIN LOW DOSE) 81 MG EC tablet Take 1 tablet (81 mg) by mouth daily. 28 tablet 11    atorvastatin (LIPITOR) 80 MG tablet TAKE 1 TABLET BY MOUTH DAILY 28 tablet 5    blood glucose (ONETOUCH ULTRA) test strip USE TO TEST BLOOD SUGAR 10 TIMES DAILY OR AS DIRECTED. 300 strip 11    buPROPion (WELLBUTRIN XL) 150 MG 24 hr tablet Take 1 tablet (150 mg) by mouth every morning. 30 tablet 11    calcium carbonate (OS-ALLEN) 1500 (600 Ca) MG tablet TAKE 1 TABLET BY MOUTH DAILY 28 tablet 5    calcium polycarbophil (FIBER-LAX) 625 MG tablet Take 2 tablets 3-5 days per week, Tues, Wed and Thurs and if needed Sat and / or Sunday to prevent and manage loose stools. 40 tablet 11    chlorhexidine (PERIDEX) 0.12 % solution Swish and spit 10 mLs in mouth 2 times daily 1893 mL 3    Cholecalciferol (D3 HIGH POTENCY) 25 MCG (1000 UT) CAPS TAKE 2 CAPSULES BY MOUTH DAILY 180 capsule 2    clonazePAM (KLONOPIN) 0.5 MG tablet TAKE 1 TABLET BY MOUTH AT BEDTIME 28 tablet 5    Continuous Glucose Sensor (DEXCOM G6 SENSOR) MISC Change every 10 days. 9 each 4    Continuous Glucose Transmitter (DEXCOM G6 TRANSMITTER) MISC Change every 3 months. 1 each 4    diclofenac (VOLTAREN) 1 % topical gel APPLY 4 GRAMS TO PAINFUL AREA AT BEDTIME . MAY USE AN ADDITIONAL 3 DOSES DURING 100 g 1    gabapentin (NEURONTIN) 300 MG capsule TAKE 1 CAPSULE BY MOUTH AT BEDTIME 28 capsule 11    HUMALOG KWIKPEN 100 UNIT/ML soln Inject 0-12 Units subcutaneously 4 times daily (with meals and nightly) Before meals: BG 81 - 150:  0 units 151  - 200: 2 units, 201 - 250: 4 units, 251-300: 6 units, 301 - 350: 8 units, 351 or greater : 10 units At BEDTIME-If >4 hours since last Humalog injection For  - 249 give 1 units, 250 - 299 give 2 units,300 - 349 give 3 units, = or > 350 give 4 units. 15 mL 1    hydrOXYzine (VISTARIL) 25 MG capsule TAKE 1 CAPSULE BY MOUTH EVERY NIGHT AT BEDTIME ANXIETY/SLEEP 28 capsule 0    ibuprofen (ADVIL/MOTRIN) 200 MG tablet Take 200 mg by mouth every 4 hours as needed for pain      insulin glargine (LANTUS SOLOSTAR) 100 UNIT/ML pen Inject 22 Units subcutaneously daily. 45 mL 1    insulin pen needle (BD AUTOSHIELD DUO) 30G X 5 MM USE 6 DAILY OR AS DIRECTED 200 each 10    Lancets (ONETOUCH DELICA PLUS [id number]) MISC 1 EACH AS NEEDED  USE TO TEST BLOOD SUGARS 1-2 TIMES DAILY AS DIRECTED 100 each 3    leflunomide (ARAVA) 20 MG tablet TAKE 1 TABLET BY MOUTH DAILY *LABS EVERY 8-12 WEEKS 30 tablet 1    Lidocaine (LIDOCAINE PAIN RELIEF) 4 % Patch APPLY 1 PATCH ONTO SKIN EVERY 24 HOURS as needed; APPLY AT NIGHT AND REMOVE IN THE MORNING ( TWELVE HOURS PATCH FREE). 30 patch 11    loperamide (IMODIUM) 2 MG capsule Take 1 capsule (2 mg) by mouth 4 times daily as needed for diarrhea. 30 capsule 11    losartan-hydrochlorothiazide (HYZAAR) 100-12.5 MG tablet Take 1 tablet by mouth daily. 30 tablet 11    Magnesium Oxide 250 MG TABS Take 1 tablet (250 mg) by mouth at bedtime. 28 tablet 11    melatonin 5 MG tablet Take 5 mg by mouth at bedtime      metoprolol succinate ER (TOPROL XL) 50 MG 24 hr tablet TAKE 1 TABLET BY MOUTH IN THE MORNING AND TAKE 2 TABLETS AT BEDTIME 84 tablet 11    mirabegron (MYRBETRIQ) 50 MG 24 hr tablet Take 1 tablet (50 mg) by mouth daily. 90 tablet 5    mupirocin (BACTROBAN) 2 % external ointment Apply topically 2 times daily as needed (bacterial rashes, cuts and scrapes). 15 g 0    naproxen (NAPROSYN) 375 MG tablet Take 1 tablet (375 mg) by mouth 2 times daily as needed for moderate pain (denatl pain) 60 tablet 11     NYAMYC [id number] UNIT/GM external powder APPLY TOPICALLY TWICE A DAY AS NEEDED 60 g 0    ondansetron (ZOFRAN) 4 MG tablet TAKE 1 TABLET BY MOUTH EVERY 8 HOURS AS NEEDED FOR NAUSEA 10 tablet 1    order for DME Equipment being ordered: Depends. 30 each 4    order for DME Equipment being ordered: CPAP Supplies. 1 each 0    paliperidone ER (INVEGA) 6 MG 24 hr tablet TAKE 2 TABLETS BY MOUTH EVERY NIGHT AT BEDTIME 60 tablet 11    pantoprazole (PROTONIX) 40 MG EC tablet Take 1 tablet (40 mg) by mouth daily. 28 tablet 11    prazosin (MINIPRESS) 1 MG capsule Take 1 mg by mouth at bedtime      QUEtiapine (SEROQUEL) 100 MG tablet Take 100 mg by mouth at bedtime      senna-docusate (SENOKOT S) 8.6-50 MG tablet Take 1 tablet by mouth daily as needed for constipation. 30 tablet 11    senna-docusate (SENOKOT-S/PERICOLACE) 8.6-50 MG tablet Take 1 tablet by mouth 2 times daily as needed for constipation (While on oral opioids.). 10 tablet 0    sertraline (ZOLOFT) 100 MG tablet Take 100 mg by mouth daily      tirzepatide (MOUNJARO) 10 MG/0.5ML SOAJ auto-injector pen Inject 0.5 mLs (10 mg) subcutaneously every 7 days. 2 mL 0    vitamin C (ASCORBIC ACID) 500 MG tablet TAKE 2 TABLETS BY MOUTH IN THE MORNING AND TAKE 2 TABLETS BY MOUTH IN THE EVENING 112 tablet 8    vitamin E (TOCOPHEROL) 400 units (180 mg) capsule Take 1 capsule (400 Units) by mouth daily. 30 capsule 11    zinc oxide (DESITIN) 20 % external ointment Apply topically 3 times daily as needed for irritation (barrier cream) 60 g 3         ALLERGIES:  Allergies   Allergen Reactions    Imidazole Antifungals Hives     Tolerates diflucan    Ketoprofen Itching     Pruritis to topical    Lisinopril Hives    Metformin Other (See Comments)     Patient hospitalized for lactic acidosis - admitting provider suspectd caused by metformin    Metronidazole Hives    Penicillins     Posaconazole Hives     Tolerates diflucan         PAST MEDICAL HISTORY:  Past Medical History:   Diagnosis  Date    Acute respiratory failure with hypoxia (H) 09/04/2017    CAD (coronary artery disease)     5/2014 cath, nonbostructive stenosis to LAD, RCA.    Chronic low back pain 01/22/2013    Cocaine abuse, in remission (H)     Fecal urgency 03/08/2012    History of heroin abuse (H)     Hyperlipidemia LDL goal <100 10/31/2010    Hypertension 07/29/2013    Illiterate 08/30/2011    Irritable bowel syndrome     Left cataract     Migraine 04/19/2012    Migraine headache 04/22/2013    Moderate major depression (H) 06/08/2011    Noncompliance with medication regimen 06/08/2011    Obesity     CINDY (obstructive sleep apnea) 03/08/2012    does not use CPAP    CINDY (obstructive sleep apnea)- mild AHI 10.3     Osteopenia 10/07/2009    Pneumonia 7/28/2019    Pneumonia of right lower lobe due to infectious organism 09/04/2017    Schizoaffective disorder, depressive type (H) 02/25/2013    Sepsis (H) 08/29/2017    Suicidal intent 10/02/2013    Takotsubo cardiomyopathy     Type 2 diabetes mellitus (H) 08/30/2011    Uterine cancer (H) 1983    Verbal auditory hallucination 10/04/2012         PAST SURGICAL HISTORY:  Past Surgical History:   Procedure Laterality Date    BIOPSY NODE SENTINEL Right 1/27/2025    Procedure: radiofrequency localized right axillary lymph node excision, right axillary sentinel lymph node biopsy;  Surgeon: Albino Giron MD;  Location: [facility] OR    CATARACT IOL, RT/LT Bilateral 2017    CHOLECYSTECTOMY      COLONOSCOPY N/A 03/16/2017    Procedure: COLONOSCOPY;  Surgeon: Traci Gonzalez MD;  Location:  GI    Coronary CTA  05/21/2014    HYSTERECTOMY  1983    uterine cancer yearly pap's per provider.    LAPAROSCOPIC CHOLECYSTECTOMY  2008    LUMPECTOMY, BREAST, LOCALIZED USING RADIOFREQUENCY IDENTIFICATION Right 1/27/2025    Procedure: Radiofrequency localized right partial mastectomy;  Surgeon: Albino Giron MD;  Location: [facility] OR    PHACOEMULSIFICATION CLEAR CORNEA WITH STANDARD INTRAOCULAR  LENS IMPLANT Left 05/05/2017    Procedure: PHACOEMULSIFICATION CLEAR CORNEA WITH STANDARD INTRAOCULAR LENS IMPLANT;  LEFT EYE PHACOEMULSIFICATION CLEAR CORNEA WITH STANDARD INTRAOCULAR LENS IMPLANT ;  Surgeon: Tyra Diaz MD;  Location:  [facility]    PHACOEMULSIFICATION CLEAR CORNEA WITH STANDARD INTRAOCULAR LENS IMPLANT Right 06/30/2017    Procedure: PHACOEMULSIFICATION CLEAR CORNEA WITH STANDARD INTRAOCULAR LENS IMPLANT;  RIGHT EYE PHACOEMULSIFICATION CLEAR CORNEA WITH STANDARD INTRAOCULAR LENS IMPLANT;  Surgeon: Tyra Diaz MD;  Location:  [facility]    RELEASE TRIGGER FINGER  10/11/2012    Left thumb. Procedure: RELEASE TRIGGER FINGER;  LEFT THUMB TRIGGER RELEASE;  Surgeon: Tay Langley MD;  Location:  [facility]    RELEASE TRIGGER FINGER Right 12/26/2016    Procedure: RELEASE TRIGGER FINGER;  Surgeon: Albino Castañeda MD;  Location: [facility] OOPHORECTORMY FOR RAHEL, W/BX  1983    UTERINE         SOCIAL HISTORY:  Social History     Socioeconomic History    Marital status:      Spouse name: Not on file    Number of children: 2    Years of education: Not on file    Highest education level: Not on file   Occupational History    Not on file   Tobacco Use    Smoking status: Never    Smokeless tobacco: Never   Vaping Use    Vaping status: Never Used   Substance and Sexual Activity    Alcohol use: Not Currently     Comment: when younger    Drug use: No     Comment: history of    Sexual activity: Not Currently     Partners: Male     Birth control/protection: Post-menopausal   Other Topics Concern    Parent/sibling w/ CABG, MI or angioplasty before 65F 55M? Not Asked     Service No    Blood Transfusions No    Caffeine Concern No    Occupational Exposure No    Hobby Hazards No    Sleep Concern Yes    Stress Concern Yes    Weight Concern Yes    Special Diet Yes     Comment: DM    Back Care Yes    Exercise Yes     Comment: WALKS DAILY    Bike Helmet Not Asked    Seat Belt Yes    Self-Exams No      Comment: ENCOURAGED   Social History Narrative     10/2014. Has 2 sons. 7 grandchildren.         Unemployed. Graduated HS.         Tobacco use: Denies    Alcohol use: Escalated use since   10/2014    Drug: Denies     Social Drivers of Health     Financial Resource Strain: Low Risk  (2025)    Financial Resource Strain     Within the past 12 months, have you or your family members you live with been unable to get utilities (heat, electricity) when it was really needed?: No   Food Insecurity: Low Risk  (2025)    Food Insecurity     Within the past 12 months, did you worry that your food would run out before you got money to buy more?: No     Within the past 12 months, did the food you bought just not last and you didn t have money to get more?: No   Transportation Needs: Low Risk  (2025)    Transportation Needs     Within the past 12 months, has lack of transportation kept you from medical appointments, getting your medicines, non-medical meetings or appointments, work, or from getting things that you need?: No   Physical Activity: Insufficiently Active (2025)    Exercise Vital Sign     Days of Exercise per Week: 2 days     Minutes of Exercise per Session: 60 min   Stress: No Stress Concern Present (2025)    [employer] [place] [employer] - Occupational Stress Questionnaire     Feeling of Stress : Only a little   Social Connections: Unknown (2025)    Social Connection and Isolation Panel [NHANES]     Frequency of Communication with Friends and Family: Not on file     Frequency of Social Gatherings with Friends and Family: Twice a week     Attends Sikhism Services: Not on file     Active Member of Clubs or Organizations: Not on file     Attends Club or Organization Meetings: Not on file     Marital Status: Not on file   Interpersonal Safety: Low Risk  (2025)    Interpersonal Safety     Do you feel physically and emotionally safe where you currently live?:  "Yes     Within the past 12 months, have you been hit, slapped, kicked or otherwise physically hurt by someone?: No     Within the past 12 months, have you been humiliated or emotionally abused in other ways by your partner or ex-partner?: No   Housing Stability: Low Risk  (2025)    Housing Stability     Do you have housing? : Yes     Are you worried about losing your housing?: No         FAMILY HISTORY:  Family History   Problem Relation Age of Onset    Cancer Mother         BLADDER    Respiratory Mother         COPD    Gastrointestinal Disease Mother         CIRRHOSIS OF LI BOLIVAR    Alcohol/Drug Mother     Diabetes Mother     Hypertension Mother     Lipids Mother     C.A.D. Mother     Glaucoma Mother     Alcohol/Drug Sister     Mental Illness Sister     Alcohol/Drug Sister     Psychotic Disorder Sister     Cancer Maternal Grandmother         UNKNOWN TYPE    Cancer Brother         COLON    Cancer - colorectal Brother         IN HIS LATE 30S    Alcohol/Drug Brother          OF HEROIN OVERDOSE AT AGE 22 YRS    Macular Degeneration No family hx of          PHYSICAL EXAM:  Vital signs:  /76 (BP Location: Right arm, Patient Position: Sitting, Cuff Size: Adult Large)   Pulse 72   Temp 97.5  F (36.4  C) (Oral)   Resp 16   Ht 1.549 m (5' 1\")   Wt 95.7 kg (211 lb)   LMP 2015 (Exact Date)   SpO2 100%   BMI 39.87 kg/m     [facility]       S/p right breast lumpectomy and grade 1 lymphedema.  Radiation changes right breast.  Left breast normal  No adenopathy  LABS:  noted    PATHOLOGY:  As per interval history    IMAGING:  Noted dexa shows osteoperosis    ASSESSMENT/PLAN:  Nena Tang is a 65 year old female with newly diagnosed breast cancer  ER+, with christiano involvement     She is not a candidate for any chemotherapy Discussed starting anastrozole.  She will also need adjuvant Zometa due to osteoporosis with her bone density    --continue anastrazole  --add magnesium glycinate 400 mg q hs  --zometa " today side effects reminded of again  --start mg for hot flashes   --6 month follow up with MD     Total time spent on day of visit, including review of tests, obtaining/reviewing separately obtained history, ordering medications/tests/procedures, communicating with PCP/consultants, and documenting in electronic medical record: 45  minutes             Xiang Baca MD  Hematology/Oncology  [facility]

## 2025-07-08 DIAGNOSIS — M81.0 OSTEOPOROSIS WITHOUT CURRENT PATHOLOGICAL FRACTURE, UNSPECIFIED OSTEOPOROSIS TYPE: ICD-10-CM

## 2025-07-08 DIAGNOSIS — R19.5 LOOSE STOOLS: ICD-10-CM

## 2025-07-08 DIAGNOSIS — E78.5 HYPERLIPIDEMIA LDL GOAL <100: ICD-10-CM

## 2025-07-08 RX ORDER — ATORVASTATIN CALCIUM 80 MG/1
80 TABLET, FILM COATED ORAL DAILY
Qty: 28 TABLET | Refills: 11 | Status: SHIPPED | OUTPATIENT
Start: 2025-07-08

## 2025-07-08 RX ORDER — CALCIUM POLYCARBOPHIL 625 MG/1
TABLET, FILM COATED ORAL
Qty: 16 TABLET | Refills: 11 | Status: SHIPPED | OUTPATIENT
Start: 2025-07-08

## 2025-07-14 DIAGNOSIS — Z79.4 TYPE 2 DIABETES MELLITUS WITH STAGE 3 CHRONIC KIDNEY DISEASE, WITH LONG-TERM CURRENT USE OF INSULIN, UNSPECIFIED WHETHER STAGE 3A OR 3B CKD (H): ICD-10-CM

## 2025-07-14 DIAGNOSIS — E66.01 MORBID OBESITY (H): ICD-10-CM

## 2025-07-14 DIAGNOSIS — N18.30 TYPE 2 DIABETES MELLITUS WITH STAGE 3 CHRONIC KIDNEY DISEASE, WITH LONG-TERM CURRENT USE OF INSULIN, UNSPECIFIED WHETHER STAGE 3A OR 3B CKD (H): ICD-10-CM

## 2025-07-14 DIAGNOSIS — E11.22 TYPE 2 DIABETES MELLITUS WITH STAGE 3 CHRONIC KIDNEY DISEASE, WITH LONG-TERM CURRENT USE OF INSULIN, UNSPECIFIED WHETHER STAGE 3A OR 3B CKD (H): ICD-10-CM

## 2025-07-16 NOTE — TELEPHONE ENCOUNTER
Last Written Prescription:  tirzepatide (MOUNJARO) 10 MG/0.5ML SOAJ auto-injector pen 2 mL 0 6/17/2025   Inject 0.5 mLs (10 mg) subcutaneously every 7 days.     ----------------------  Last Visit Date: 6/17/25  Future Visit Date: 9/30/25  ----------------------      Refill decision:     [x] Medication unable to be refilled by RN due to:  Abnormal labs/test:    Component      Latest Ref Rng 7/7/2025  10:29 AM   Creatinine      0.51 - 0.95 mg/dL 1.73 (H)    GFR Estimate      >60 mL/min/1.73m2 32 (L)             Request from pharmacy:  Requested Prescriptions   Pending Prescriptions Disp Refills    MOUNJARO 10 MG/0.5ML SOAJ auto-injector pen [Pharmacy Med Name: Mounjaro 10MG/0.5ML SOAJ] 2 mL 0     Sig: INJECT 0.5ML SUBCUTANEOUSLY EVERY 7 DAYS       GLP-1 Agonists Protocol Failed - 7/16/2025 12:30 PM        Failed - Medication is active on med list and the sig matches. RN to manually verify dose and sig if red X/fail.     If the protocol passes (green check), you do not need to verify med dose and sig.    A prescription matches if they are the same clinical intention.    For Example: once daily and every morning are the same.    The protocol can not identify upper and lower case letters as matching and will fail.     For Example: Take 1 tablet (50 mg) by mouth daily     TAKE 1 TABLET (50 MG) BY MOUTH DAILY    For all fails (red x), verify dose and sig.    If the refill does match what is on file, the RN can still proceed to approve the refill request.       If they do not match, route to the appropriate provider.             Failed - Has GFR on file in past 12 months and most recent value is normal        Passed - HgbA1C in past 3 or 6 months     If HgbA1C is 8 or greater, it needs to be on file within the past 3 months.  If less than 8, must be on file within the past 6 months.     Recent Labs   Lab Test 06/03/25  1139 03/24/22  1211 02/25/22  1318   A1C 6.2*   < >  --    46764  --   --  7.7*    < > = values in this  interval not displayed.             Passed - Recent (6 month) or future (90 days) visit with the authorizing provider's specialty (provided they have been seen in the past 9 months)     The patient must have completed an in-person or virtual visit within the past 6 months or has a future visit scheduled within the next 90 days with the authorizing provider s specialty.  Urgent care and e-visits do not quality as an office visit for this protocol.          Passed - Medication indicated for associated diagnosis     Medication is associated with one or more of the following diagnoses:     Type 2 diabetes mellitus           Passed - Patient is age 18 or older        Passed - No active pregnancy on record        Passed - No positive pregnancy test in past 12 months

## 2025-07-21 RX ORDER — TIRZEPATIDE 10 MG/.5ML
10 INJECTION, SOLUTION SUBCUTANEOUS WEEKLY
Qty: 2 ML | Refills: 2 | Status: SHIPPED | OUTPATIENT
Start: 2025-07-21

## 2025-07-23 DIAGNOSIS — N95.1 MENOPAUSAL SYNDROME (HOT FLASHES): Primary | ICD-10-CM

## 2025-07-23 RX ORDER — MAGNESIUM GLYCINATE 100 MG
400 CAPSULE ORAL DAILY
Qty: 90 CAPSULE | Refills: 2 | Status: SHIPPED | OUTPATIENT
Start: 2025-07-23

## 2025-08-05 ENCOUNTER — ANCILLARY PROCEDURE (OUTPATIENT)
Dept: CT IMAGING | Facility: CLINIC | Age: 65
End: 2025-08-05
Attending: FAMILY MEDICINE
Payer: COMMERCIAL

## 2025-08-05 ENCOUNTER — OFFICE VISIT (OUTPATIENT)
Dept: FAMILY MEDICINE | Facility: CLINIC | Age: 65
End: 2025-08-05
Attending: FAMILY MEDICINE
Payer: COMMERCIAL

## 2025-08-05 ENCOUNTER — OFFICE VISIT (OUTPATIENT)
Dept: PEDIATRICS | Facility: CLINIC | Age: 65
End: 2025-08-05
Payer: COMMERCIAL

## 2025-08-05 ENCOUNTER — OFFICE VISIT (OUTPATIENT)
Dept: PHARMACY | Facility: CLINIC | Age: 65
End: 2025-08-05
Payer: COMMERCIAL

## 2025-08-05 VITALS
SYSTOLIC BLOOD PRESSURE: 94 MMHG | HEART RATE: 71 BPM | TEMPERATURE: 97.3 F | DIASTOLIC BLOOD PRESSURE: 57 MMHG | OXYGEN SATURATION: 98 % | RESPIRATION RATE: 16 BRPM | HEIGHT: 61 IN | WEIGHT: 215 LBS | BODY MASS INDEX: 40.59 KG/M2

## 2025-08-05 VITALS
SYSTOLIC BLOOD PRESSURE: 90 MMHG | BODY MASS INDEX: 40.59 KG/M2 | HEART RATE: 69 BPM | HEIGHT: 61 IN | OXYGEN SATURATION: 97 % | RESPIRATION RATE: 16 BRPM | DIASTOLIC BLOOD PRESSURE: 53 MMHG | TEMPERATURE: 96.8 F | WEIGHT: 215 LBS

## 2025-08-05 DIAGNOSIS — I10 HTN, GOAL BELOW 140/90: ICD-10-CM

## 2025-08-05 DIAGNOSIS — N18.32 CHRONIC KIDNEY DISEASE, STAGE 3B (H): ICD-10-CM

## 2025-08-05 DIAGNOSIS — I10 ESSENTIAL HYPERTENSION WITH GOAL BLOOD PRESSURE LESS THAN 130/80: ICD-10-CM

## 2025-08-05 DIAGNOSIS — R10.31 RLQ ABDOMINAL PAIN: Primary | ICD-10-CM

## 2025-08-05 DIAGNOSIS — N18.30 TYPE 2 DIABETES MELLITUS WITH STAGE 3 CHRONIC KIDNEY DISEASE, WITH LONG-TERM CURRENT USE OF INSULIN, UNSPECIFIED WHETHER STAGE 3A OR 3B CKD (H): Primary | ICD-10-CM

## 2025-08-05 DIAGNOSIS — E11.22 TYPE 2 DIABETES MELLITUS WITH STAGE 3 CHRONIC KIDNEY DISEASE, WITH LONG-TERM CURRENT USE OF INSULIN, UNSPECIFIED WHETHER STAGE 3A OR 3B CKD (H): Primary | ICD-10-CM

## 2025-08-05 DIAGNOSIS — R10.31 RLQ ABDOMINAL PAIN: ICD-10-CM

## 2025-08-05 DIAGNOSIS — R10.31 RIGHT LOWER QUADRANT PAIN: Primary | ICD-10-CM

## 2025-08-05 DIAGNOSIS — M81.0 OSTEOPOROSIS WITHOUT CURRENT PATHOLOGICAL FRACTURE, UNSPECIFIED OSTEOPOROSIS TYPE: ICD-10-CM

## 2025-08-05 DIAGNOSIS — Z79.4 TYPE 2 DIABETES MELLITUS WITH STAGE 3 CHRONIC KIDNEY DISEASE, WITH LONG-TERM CURRENT USE OF INSULIN, UNSPECIFIED WHETHER STAGE 3A OR 3B CKD (H): Primary | ICD-10-CM

## 2025-08-05 LAB
ALBUMIN SERPL-MCNC: 3.8 G/DL (ref 3.4–5)
ALP SERPL-CCNC: 78 U/L (ref 40–150)
ALT SERPL W P-5'-P-CCNC: 18 U/L (ref 0–50)
ANION GAP SERPL CALCULATED.3IONS-SCNC: 11 MMOL/L (ref 3–14)
AST SERPL W P-5'-P-CCNC: 18 U/L (ref 0–45)
BASOPHILS # BLD AUTO: 0 10E3/UL (ref 0–0.2)
BASOPHILS NFR BLD AUTO: 0 %
BILIRUB SERPL-MCNC: 0.6 MG/DL (ref 0.2–1.3)
BUN SERPL-MCNC: 26 MG/DL (ref 7–30)
CALCIUM SERPL-MCNC: 10.2 MG/DL (ref 8.5–10.1)
CHLORIDE BLD-SCNC: 106 MMOL/L (ref 94–109)
CO2 SERPL-SCNC: 25 MMOL/L (ref 20–32)
CREAT BLD-MCNC: 1.6 MG/DL (ref 0.5–1)
CREAT SERPL-MCNC: 1.7 MG/DL (ref 0.52–1.04)
CRP SERPL-MCNC: <3 MG/L
EGFRCR SERPLBLD CKD-EPI 2021: 33 ML/MIN/1.73M2
EGFRCR SERPLBLD CKD-EPI 2021: 36 ML/MIN/1.73M2
EOSINOPHIL # BLD AUTO: 0.1 10E3/UL (ref 0–0.7)
EOSINOPHIL NFR BLD AUTO: 2 %
ERYTHROCYTE [DISTWIDTH] IN BLOOD BY AUTOMATED COUNT: 12.5 % (ref 10–15)
GLUCOSE BLD-MCNC: 119 MG/DL (ref 70–99)
HCT VFR BLD AUTO: 30.2 % (ref 35–47)
HGB BLD-MCNC: 9.8 G/DL (ref 11.7–15.7)
IMM GRANULOCYTES # BLD: 0 10E3/UL
IMM GRANULOCYTES NFR BLD: 0 %
LIPASE SERPL-CCNC: 69 U/L (ref 13–60)
LYMPHOCYTES # BLD AUTO: 1.2 10E3/UL (ref 0.8–5.3)
LYMPHOCYTES NFR BLD AUTO: 28 %
MCH RBC QN AUTO: 31.8 PG (ref 26.5–33)
MCHC RBC AUTO-ENTMCNC: 32.5 G/DL (ref 31.5–36.5)
MCV RBC AUTO: 98 FL (ref 78–100)
MONOCYTES # BLD AUTO: 0.5 10E3/UL (ref 0–1.3)
MONOCYTES NFR BLD AUTO: 11 %
NEUTROPHILS # BLD AUTO: 2.6 10E3/UL (ref 1.6–8.3)
NEUTROPHILS NFR BLD AUTO: 59 %
PLATELET # BLD AUTO: 186 10E3/UL (ref 150–450)
POTASSIUM BLD-SCNC: 4.8 MMOL/L (ref 3.4–5.3)
PROT SERPL-MCNC: 7.9 G/DL (ref 6.8–8.8)
RBC # BLD AUTO: 3.08 10E6/UL (ref 3.8–5.2)
SODIUM SERPL-SCNC: 142 MMOL/L (ref 135–145)
WBC # BLD AUTO: 4.5 10E3/UL (ref 4–11)

## 2025-08-05 PROCEDURE — 99213 OFFICE O/P EST LOW 20 MIN: CPT | Performed by: FAMILY MEDICINE

## 2025-08-05 PROCEDURE — 74177 CT ABD & PELVIS W/CONTRAST: CPT

## 2025-08-05 PROCEDURE — 36415 COLL VENOUS BLD VENIPUNCTURE: CPT | Performed by: FAMILY MEDICINE

## 2025-08-05 PROCEDURE — 3074F SYST BP LT 130 MM HG: CPT | Performed by: FAMILY MEDICINE

## 2025-08-05 PROCEDURE — 82565 ASSAY OF CREATININE: CPT | Performed by: FAMILY MEDICINE

## 2025-08-05 PROCEDURE — 3078F DIAST BP <80 MM HG: CPT | Performed by: FAMILY MEDICINE

## 2025-08-05 PROCEDURE — 86140 C-REACTIVE PROTEIN: CPT | Performed by: FAMILY MEDICINE

## 2025-08-05 PROCEDURE — 99607 MTMS BY PHARM ADDL 15 MIN: CPT | Performed by: PHARMACIST

## 2025-08-05 PROCEDURE — G2211 COMPLEX E/M VISIT ADD ON: HCPCS | Performed by: FAMILY MEDICINE

## 2025-08-05 PROCEDURE — 250N000011 HC RX IP 250 OP 636: Performed by: FAMILY MEDICINE

## 2025-08-05 PROCEDURE — 99606 MTMS BY PHARM EST 15 MIN: CPT | Performed by: PHARMACIST

## 2025-08-05 PROCEDURE — 83690 ASSAY OF LIPASE: CPT | Performed by: FAMILY MEDICINE

## 2025-08-05 PROCEDURE — 250N000009 HC RX 250: Performed by: FAMILY MEDICINE

## 2025-08-05 PROCEDURE — 99215 OFFICE O/P EST HI 40 MIN: CPT | Performed by: FAMILY MEDICINE

## 2025-08-05 PROCEDURE — 85025 COMPLETE CBC W/AUTO DIFF WBC: CPT | Performed by: FAMILY MEDICINE

## 2025-08-05 PROCEDURE — 82565 ASSAY OF CREATININE: CPT

## 2025-08-05 PROCEDURE — 1125F AMNT PAIN NOTED PAIN PRSNT: CPT | Performed by: FAMILY MEDICINE

## 2025-08-05 RX ORDER — IOPAMIDOL 755 MG/ML
106 INJECTION, SOLUTION INTRAVASCULAR ONCE
Status: COMPLETED | OUTPATIENT
Start: 2025-08-05 | End: 2025-08-05

## 2025-08-05 RX ADMIN — IOPAMIDOL 106 ML: 755 INJECTION, SOLUTION INTRAVENOUS at 13:04

## 2025-08-05 RX ADMIN — SODIUM CHLORIDE 50 ML: 9 INJECTION, SOLUTION INTRAVENOUS at 13:04

## 2025-08-05 ASSESSMENT — PAIN SCALES - GENERAL: PAINLEVEL_OUTOF10: SEVERE PAIN (7)

## 2025-08-06 RX ORDER — LOSARTAN POTASSIUM AND HYDROCHLOROTHIAZIDE 12.5; 1 MG/1; MG/1
TABLET ORAL
Qty: 15 TABLET | Refills: 11 | Status: SHIPPED | OUTPATIENT
Start: 2025-08-06

## 2025-08-11 ENCOUNTER — LAB (OUTPATIENT)
Dept: LAB | Facility: CLINIC | Age: 65
End: 2025-08-11
Payer: COMMERCIAL

## 2025-08-11 DIAGNOSIS — I10 HTN, GOAL BELOW 140/90: ICD-10-CM

## 2025-08-11 DIAGNOSIS — I25.10 CAD (CORONARY ARTERY DISEASE): ICD-10-CM

## 2025-08-11 DIAGNOSIS — Z79.4 TYPE 2 DIABETES MELLITUS WITH STAGE 3 CHRONIC KIDNEY DISEASE, WITH LONG-TERM CURRENT USE OF INSULIN, UNSPECIFIED WHETHER STAGE 3A OR 3B CKD (H): ICD-10-CM

## 2025-08-11 DIAGNOSIS — N18.30 TYPE 2 DIABETES MELLITUS WITH STAGE 3 CHRONIC KIDNEY DISEASE, WITH LONG-TERM CURRENT USE OF INSULIN, UNSPECIFIED WHETHER STAGE 3A OR 3B CKD (H): ICD-10-CM

## 2025-08-11 DIAGNOSIS — Z13.6 SCREENING FOR CARDIOVASCULAR CONDITION: ICD-10-CM

## 2025-08-11 DIAGNOSIS — N18.32 CHRONIC KIDNEY DISEASE, STAGE 3B (H): Primary | ICD-10-CM

## 2025-08-11 DIAGNOSIS — M19.90 INFLAMMATORY ARTHRITIS: ICD-10-CM

## 2025-08-11 DIAGNOSIS — E11.22 TYPE 2 DIABETES MELLITUS WITH STAGE 3 CHRONIC KIDNEY DISEASE, WITH LONG-TERM CURRENT USE OF INSULIN, UNSPECIFIED WHETHER STAGE 3A OR 3B CKD (H): ICD-10-CM

## 2025-08-11 LAB
ALT SERPL W P-5'-P-CCNC: 29 U/L (ref 0–50)
ANION GAP SERPL CALCULATED.3IONS-SCNC: 10 MMOL/L (ref 7–15)
AST SERPL W P-5'-P-CCNC: 21 U/L (ref 0–45)
BUN SERPL-MCNC: 28.5 MG/DL (ref 8–23)
CALCIUM SERPL-MCNC: 9.1 MG/DL (ref 8.8–10.4)
CHLORIDE SERPL-SCNC: 103 MMOL/L (ref 98–107)
CHOLEST SERPL-MCNC: 140 MG/DL
CREAT SERPL-MCNC: 1.34 MG/DL (ref 0.51–0.95)
CREAT UR-MCNC: 30.8 MG/DL
CRP SERPL-MCNC: <3 MG/L
EGFRCR SERPLBLD CKD-EPI 2021: 44 ML/MIN/1.73M2
FASTING STATUS PATIENT QL REPORTED: YES
FASTING STATUS PATIENT QL REPORTED: YES
GLUCOSE SERPL-MCNC: 98 MG/DL (ref 70–99)
HCO3 SERPL-SCNC: 21 MMOL/L (ref 22–29)
HDLC SERPL-MCNC: 46 MG/DL
LDLC SERPL CALC-MCNC: 62 MG/DL
MICROALBUMIN UR-MCNC: <12 MG/L
MICROALBUMIN/CREAT UR: NORMAL MG/G{CREAT}
NONHDLC SERPL-MCNC: 94 MG/DL
POTASSIUM SERPL-SCNC: 4.6 MMOL/L (ref 3.4–5.3)
PTH-INTACT SERPL-MCNC: 84 PG/ML (ref 15–65)
SODIUM SERPL-SCNC: 134 MMOL/L (ref 135–145)
TRIGL SERPL-MCNC: 159 MG/DL
TSH SERPL DL<=0.005 MIU/L-ACNC: 2.53 UIU/ML (ref 0.3–4.2)

## 2025-08-11 PROCEDURE — 84460 ALANINE AMINO (ALT) (SGPT): CPT

## 2025-08-11 PROCEDURE — 86140 C-REACTIVE PROTEIN: CPT

## 2025-08-11 PROCEDURE — 82570 ASSAY OF URINE CREATININE: CPT

## 2025-08-11 PROCEDURE — 80061 LIPID PANEL: CPT

## 2025-08-11 PROCEDURE — 84443 ASSAY THYROID STIM HORMONE: CPT

## 2025-08-11 PROCEDURE — 84450 TRANSFERASE (AST) (SGOT): CPT

## 2025-08-11 PROCEDURE — 82043 UR ALBUMIN QUANTITATIVE: CPT

## 2025-08-11 PROCEDURE — 83970 ASSAY OF PARATHORMONE: CPT

## 2025-08-11 PROCEDURE — 80048 BASIC METABOLIC PNL TOTAL CA: CPT

## 2025-08-11 PROCEDURE — 36415 COLL VENOUS BLD VENIPUNCTURE: CPT

## 2025-08-12 ENCOUNTER — RESULTS FOLLOW-UP (OUTPATIENT)
Dept: PHARMACY | Facility: CLINIC | Age: 65
End: 2025-08-12
Payer: COMMERCIAL

## 2025-08-14 ENCOUNTER — OFFICE VISIT (OUTPATIENT)
Dept: RHEUMATOLOGY | Facility: CLINIC | Age: 65
End: 2025-08-14
Payer: COMMERCIAL

## 2025-08-14 VITALS
OXYGEN SATURATION: 100 % | BODY MASS INDEX: 42.46 KG/M2 | WEIGHT: 222 LBS | DIASTOLIC BLOOD PRESSURE: 80 MMHG | HEART RATE: 75 BPM | SYSTOLIC BLOOD PRESSURE: 135 MMHG

## 2025-08-14 DIAGNOSIS — R76.8 CENTROMERE ANTIBODY POSITIVE: ICD-10-CM

## 2025-08-14 DIAGNOSIS — Z79.899 LONG-TERM USE OF HIGH-RISK MEDICATION: ICD-10-CM

## 2025-08-14 DIAGNOSIS — C50.911 MALIGNANT NEOPLASM OF RIGHT FEMALE BREAST, UNSPECIFIED ESTROGEN RECEPTOR STATUS, UNSPECIFIED SITE OF BREAST (H): ICD-10-CM

## 2025-08-14 DIAGNOSIS — M19.90 INFLAMMATORY ARTHRITIS: Primary | ICD-10-CM

## 2025-08-14 DIAGNOSIS — R76.8 RHEUMATOID FACTOR POSITIVE: ICD-10-CM

## 2025-08-14 DIAGNOSIS — N18.31 STAGE 3A CHRONIC KIDNEY DISEASE (H): ICD-10-CM

## 2025-08-14 DIAGNOSIS — R76.8 POSITIVE ANA (ANTINUCLEAR ANTIBODY): ICD-10-CM

## 2025-08-14 ASSESSMENT — PAIN SCALES - GENERAL: PAINLEVEL_OUTOF10: NO PAIN (0)

## 2025-08-28 ENCOUNTER — MYC REFILL (OUTPATIENT)
Dept: EDUCATION SERVICES | Facility: CLINIC | Age: 65
End: 2025-08-28
Payer: COMMERCIAL

## 2025-08-28 DIAGNOSIS — E11.3299 TYPE 2 DIABETES MELLITUS WITH MILD NONPROLIFERATIVE RETINOPATHY WITHOUT MACULAR EDEMA, WITH LONG-TERM CURRENT USE OF INSULIN, UNSPECIFIED LATERALITY (H): ICD-10-CM

## 2025-08-28 DIAGNOSIS — Z79.4 TYPE 2 DIABETES MELLITUS WITH MILD NONPROLIFERATIVE RETINOPATHY WITHOUT MACULAR EDEMA, WITH LONG-TERM CURRENT USE OF INSULIN, UNSPECIFIED LATERALITY (H): ICD-10-CM

## 2025-08-28 RX ORDER — PROCHLORPERAZINE 25 MG/1
SUPPOSITORY RECTAL
Qty: 9 EACH | Refills: 4 | Status: SHIPPED | OUTPATIENT
Start: 2025-08-28

## (undated) DEVICE — CLIP ETHICON LIGACLIP SM BLUE LT100

## (undated) DEVICE — NDL 19GA 1.5"

## (undated) DEVICE — SET BREAST BIOPSY LOCALIZER 20 PROBE 8MM PENCIL 09-0006

## (undated) DEVICE — SU VICRYL 4-0 PS-2 18" UND J496H

## (undated) DEVICE — GLOVE BIOGEL PI MICRO INDICATOR UNDERGLOVE SZ 7.5 48975

## (undated) DEVICE — EYE SHIELD PLASTIC

## (undated) DEVICE — GLOVE PROTEXIS MICRO 6.5  2D73PM65

## (undated) DEVICE — SUCTION CANISTER MEDIVAC LINER 3000ML W/LID 65651-530

## (undated) DEVICE — BAG DECANTER STERILE WHITE DYNJDEC09

## (undated) DEVICE — ESU GROUND PAD UNIVERSAL W/O CORD

## (undated) DEVICE — EYE PACK BVI READYPAK KIT #3

## (undated) DEVICE — LINEN TOWEL PACK X5 5464

## (undated) DEVICE — SUCTION MANIFOLD NEPTUNE 2 SYS 4 PORT 0702-020-000

## (undated) DEVICE — PREP CHLORAPREP 26ML TINTED HI-LITE ORANGE 930815

## (undated) DEVICE — GLOVE PROTEXIS MICRO 7.0  2D73PM70

## (undated) DEVICE — SUCTION TIP YANKAUER W/O VENT K86

## (undated) DEVICE — ESU PENCIL W/SMOKE EVAC NEPTUNE STRYKER 0703-046-000

## (undated) DEVICE — SU VICRYL+ 2-0 12X18IN VIO VCP105G

## (undated) DEVICE — DRSG GAUZE 4X4" 3033

## (undated) DEVICE — SU VICRYL 3-0 SH 27" J316H

## (undated) DEVICE — EYE KNIFE SLIT XSTAR VISITEC 2.6MM 45DEG 373726

## (undated) DEVICE — PACK CATARACT CUSTOM SO DALE SEY32CTFCX

## (undated) DEVICE — GOWN LG DISP 9515

## (undated) DEVICE — NDL 25GA 1.5" 305127

## (undated) DEVICE — DRSG STERI STRIP 1/2X4" R1547

## (undated) DEVICE — EYE PACK CUSTOM ANTERIOR 45DEG TIP CENTURION PPK6925-01

## (undated) DEVICE — TAPE MICROPORE 1"X1.5YD 1530S-1

## (undated) DEVICE — PACK MINOR SBA15MIFSE

## (undated) DEVICE — PAD CHUX UNDERPAD 23X24" 7136

## (undated) DEVICE — SOL WATER IRRIG 1000ML BOTTLE 2F7114

## (undated) DEVICE — EYE SOL BSS 500ML

## (undated) DEVICE — TAPE MICROPORE 2"X1.5YD 1530S-2

## (undated) DEVICE — SYR 10ML FINGER CONTROL W/O NDL 309695

## (undated) DEVICE — TAPE SILICONE KIND REMOVAL 2" 2770S-2

## (undated) DEVICE — GLOVE PROTEXIS MICRO 8.5  2D73PM85

## (undated) DEVICE — MARKER MARGIN MARKER STD 6 COLOR SGL USE MMS6

## (undated) DEVICE — VIAL DECANTER STERILE WHITE DYNJDEC06

## (undated) DEVICE — GLOVE BIOGEL PI MICRO SZ 7.5 48575

## (undated) DEVICE — DRAPE BREAST/CHEST 29420

## (undated) RX ORDER — LIDOCAINE HYDROCHLORIDE 10 MG/ML
INJECTION, SOLUTION EPIDURAL; INFILTRATION; INTRACAUDAL; PERINEURAL
Status: DISPENSED
Start: 2017-06-30

## (undated) RX ORDER — EPHEDRINE SULFATE 50 MG/ML
INJECTION, SOLUTION INTRAMUSCULAR; INTRAVENOUS; SUBCUTANEOUS
Status: DISPENSED
Start: 2025-01-27

## (undated) RX ORDER — OXYCODONE HYDROCHLORIDE 5 MG/1
TABLET ORAL
Status: DISPENSED
Start: 2025-01-27

## (undated) RX ORDER — PROPOFOL 10 MG/ML
INJECTION, EMULSION INTRAVENOUS
Status: DISPENSED
Start: 2025-01-27

## (undated) RX ORDER — PREDNISOLONE ACETATE 10 MG/ML
SUSPENSION/ DROPS OPHTHALMIC
Status: DISPENSED
Start: 2017-06-30

## (undated) RX ORDER — DEXAMETHASONE SODIUM PHOSPHATE 4 MG/ML
INJECTION, SOLUTION INTRA-ARTICULAR; INTRALESIONAL; INTRAMUSCULAR; INTRAVENOUS; SOFT TISSUE
Status: DISPENSED
Start: 2025-01-27

## (undated) RX ORDER — ACETAMINOPHEN 500 MG
TABLET ORAL
Status: DISPENSED
Start: 2017-05-05

## (undated) RX ORDER — ONDANSETRON 2 MG/ML
INJECTION INTRAMUSCULAR; INTRAVENOUS
Status: DISPENSED
Start: 2025-01-27

## (undated) RX ORDER — METOPROLOL SUCCINATE 50 MG/1
TABLET, EXTENDED RELEASE ORAL
Status: DISPENSED
Start: 2025-01-27

## (undated) RX ORDER — ONDANSETRON 2 MG/ML
INJECTION INTRAMUSCULAR; INTRAVENOUS
Status: DISPENSED
Start: 2017-05-05

## (undated) RX ORDER — FENTANYL CITRATE 0.05 MG/ML
INJECTION, SOLUTION INTRAMUSCULAR; INTRAVENOUS
Status: DISPENSED
Start: 2025-01-27

## (undated) RX ORDER — ONDANSETRON 2 MG/ML
INJECTION INTRAMUSCULAR; INTRAVENOUS
Status: DISPENSED
Start: 2017-06-30

## (undated) RX ORDER — FENTANYL CITRATE 50 UG/ML
INJECTION, SOLUTION INTRAMUSCULAR; INTRAVENOUS
Status: DISPENSED
Start: 2025-01-27

## (undated) RX ORDER — CEFAZOLIN SODIUM/WATER 2 G/20 ML
SYRINGE (ML) INTRAVENOUS
Status: DISPENSED
Start: 2025-01-27

## (undated) RX ORDER — FENTANYL CITRATE 50 UG/ML
INJECTION, SOLUTION INTRAMUSCULAR; INTRAVENOUS
Status: DISPENSED
Start: 2017-06-30

## (undated) RX ORDER — OFLOXACIN 3 MG/ML
SOLUTION/ DROPS OPHTHALMIC
Status: DISPENSED
Start: 2017-06-30